# Patient Record
Sex: MALE | Race: BLACK OR AFRICAN AMERICAN | NOT HISPANIC OR LATINO | Employment: OTHER | URBAN - METROPOLITAN AREA
[De-identification: names, ages, dates, MRNs, and addresses within clinical notes are randomized per-mention and may not be internally consistent; named-entity substitution may affect disease eponyms.]

---

## 2017-01-12 ENCOUNTER — HOSPITAL ENCOUNTER (INPATIENT)
Facility: HOSPITAL | Age: 47
LOS: 11 days | Discharge: HOME | DRG: 617 | End: 2017-01-23
Attending: EMERGENCY MEDICINE | Admitting: HOSPITALIST
Payer: MEDICAID

## 2017-01-12 DIAGNOSIS — L97.519 DIABETIC ULCER OF RIGHT FOOT ASSOCIATED WITH TYPE 2 DIABETES MELLITUS: Chronic | ICD-10-CM

## 2017-01-12 DIAGNOSIS — B99.9 INFECTION: ICD-10-CM

## 2017-01-12 DIAGNOSIS — A42.9 ACTINOMYCES INFECTION: ICD-10-CM

## 2017-01-12 DIAGNOSIS — A49.1 GROUP B STREPTOCOCCAL INFECTION: ICD-10-CM

## 2017-01-12 DIAGNOSIS — M86.9 OSTEOMYELITIS OF RIGHT FOOT: ICD-10-CM

## 2017-01-12 DIAGNOSIS — M86.271 SUBACUTE OSTEOMYELITIS OF RIGHT FOOT: Primary | ICD-10-CM

## 2017-01-12 DIAGNOSIS — E11.40 TYPE 2 DIABETES MELLITUS WITH DIABETIC NEUROPATHY, WITH LONG-TERM CURRENT USE OF INSULIN: Chronic | ICD-10-CM

## 2017-01-12 DIAGNOSIS — M24.571 EQUINUS CONTRACTURE OF RIGHT ANKLE: ICD-10-CM

## 2017-01-12 DIAGNOSIS — M86.9 OSTEOMYELITIS OF RIGHT FOOT, UNSPECIFIED CHRONICITY: ICD-10-CM

## 2017-01-12 DIAGNOSIS — Z79.4 TYPE 2 DIABETES MELLITUS WITH DIABETIC NEUROPATHY, WITH LONG-TERM CURRENT USE OF INSULIN: Chronic | ICD-10-CM

## 2017-01-12 DIAGNOSIS — E11.621 DIABETIC ULCER OF RIGHT FOOT ASSOCIATED WITH TYPE 2 DIABETES MELLITUS: Chronic | ICD-10-CM

## 2017-01-12 DIAGNOSIS — B35.3 TINEA PEDIS OF LEFT FOOT: ICD-10-CM

## 2017-01-12 LAB
ALBUMIN SERPL BCP-MCNC: 2.7 G/DL
ALP SERPL-CCNC: 109 U/L
ALT SERPL W/O P-5'-P-CCNC: 7 U/L
ANION GAP SERPL CALC-SCNC: 14 MMOL/L
APTT BLDCRRT: 31.5 SEC
AST SERPL-CCNC: 12 U/L
B-OH-BUTYR BLD STRIP-SCNC: 0.3 MMOL/L
BACTERIA #/AREA URNS HPF: NORMAL /HPF
BASOPHILS # BLD AUTO: 0.02 K/UL
BASOPHILS NFR BLD: 0.2 %
BILIRUB SERPL-MCNC: 0.3 MG/DL
BILIRUB UR QL STRIP: NEGATIVE
BUN SERPL-MCNC: 16 MG/DL
CALCIUM SERPL-MCNC: 9.9 MG/DL
CHLORIDE SERPL-SCNC: 96 MMOL/L
CHOLEST/HDLC SERPL: 3.3 {RATIO}
CLARITY UR: CLEAR
CO2 SERPL-SCNC: 26 MMOL/L
COLOR UR: YELLOW
CREAT SERPL-MCNC: 1.1 MG/DL
CRP SERPL-MCNC: 19.6 MG/L
DIFFERENTIAL METHOD: ABNORMAL
EOSINOPHIL # BLD AUTO: 0 K/UL
EOSINOPHIL NFR BLD: 0.3 %
ERYTHROCYTE [DISTWIDTH] IN BLOOD BY AUTOMATED COUNT: 12.9 %
ERYTHROCYTE [SEDIMENTATION RATE] IN BLOOD BY WESTERGREN METHOD: 113 MM/HR
EST. GFR  (AFRICAN AMERICAN): >60 ML/MIN/1.73 M^2
EST. GFR  (NON AFRICAN AMERICAN): >60 ML/MIN/1.73 M^2
GLUCOSE SERPL-MCNC: 421 MG/DL
GLUCOSE UR QL STRIP: ABNORMAL
GRAM STN SPEC: NORMAL
GRAM STN SPEC: NORMAL
HCT VFR BLD AUTO: 37 %
HDL/CHOLESTEROL RATIO: 30.5 %
HDLC SERPL-MCNC: 141 MG/DL
HDLC SERPL-MCNC: 43 MG/DL
HGB BLD-MCNC: 11.8 G/DL
HGB UR QL STRIP: NEGATIVE
INR PPP: 1.1
KETONES UR QL STRIP: ABNORMAL
LDLC SERPL CALC-MCNC: 81.2 MG/DL
LEUKOCYTE ESTERASE UR QL STRIP: NEGATIVE
LYMPHOCYTES # BLD AUTO: 1.8 K/UL
LYMPHOCYTES NFR BLD: 18.9 %
MCH RBC QN AUTO: 28.4 PG
MCHC RBC AUTO-ENTMCNC: 31.9 %
MCV RBC AUTO: 89 FL
MICROSCOPIC COMMENT: NORMAL
MONOCYTES # BLD AUTO: 0.5 K/UL
MONOCYTES NFR BLD: 5.1 %
NEUTROPHILS # BLD AUTO: 7.2 K/UL
NEUTROPHILS NFR BLD: 75.3 %
NITRITE UR QL STRIP: NEGATIVE
NONHDLC SERPL-MCNC: 98 MG/DL
PH UR STRIP: 6 [PH] (ref 5–8)
PLATELET # BLD AUTO: 338 K/UL
PMV BLD AUTO: 10.6 FL
POCT GLUCOSE: 220 MG/DL (ref 70–110)
POCT GLUCOSE: 339 MG/DL (ref 70–110)
POCT GLUCOSE: 339 MG/DL (ref 70–110)
POCT GLUCOSE: 384 MG/DL (ref 70–110)
POCT GLUCOSE: 403 MG/DL (ref 70–110)
POTASSIUM SERPL-SCNC: 4.4 MMOL/L
PROT SERPL-MCNC: 7.9 G/DL
PROT UR QL STRIP: NEGATIVE
PROTHROMBIN TIME: 11.4 SEC
RBC # BLD AUTO: 4.15 M/UL
SODIUM SERPL-SCNC: 136 MMOL/L
SP GR UR STRIP: 1.02 (ref 1–1.03)
TRIGL SERPL-MCNC: 84 MG/DL
URN SPEC COLLECT METH UR: ABNORMAL
UROBILINOGEN UR STRIP-ACNC: NEGATIVE EU/DL
WBC # BLD AUTO: 9.52 K/UL
YEAST URNS QL MICRO: NORMAL

## 2017-01-12 PROCEDURE — 87070 CULTURE OTHR SPECIMN AEROBIC: CPT

## 2017-01-12 PROCEDURE — 81000 URINALYSIS NONAUTO W/SCOPE: CPT

## 2017-01-12 PROCEDURE — 99233 SBSQ HOSP IP/OBS HIGH 50: CPT | Mod: ,,, | Performed by: PODIATRIST

## 2017-01-12 PROCEDURE — 82962 GLUCOSE BLOOD TEST: CPT

## 2017-01-12 PROCEDURE — 25000003 PHARM REV CODE 250: Performed by: INTERNAL MEDICINE

## 2017-01-12 PROCEDURE — 83036 HEMOGLOBIN GLYCOSYLATED A1C: CPT

## 2017-01-12 PROCEDURE — 11000001 HC ACUTE MED/SURG PRIVATE ROOM

## 2017-01-12 PROCEDURE — 85730 THROMBOPLASTIN TIME PARTIAL: CPT

## 2017-01-12 PROCEDURE — 25000003 PHARM REV CODE 250: Performed by: EMERGENCY MEDICINE

## 2017-01-12 PROCEDURE — 87075 CULTR BACTERIA EXCEPT BLOOD: CPT

## 2017-01-12 PROCEDURE — 96375 TX/PRO/DX INJ NEW DRUG ADDON: CPT

## 2017-01-12 PROCEDURE — 36415 COLL VENOUS BLD VENIPUNCTURE: CPT

## 2017-01-12 PROCEDURE — 63600175 PHARM REV CODE 636 W HCPCS: Performed by: NURSE PRACTITIONER

## 2017-01-12 PROCEDURE — 80053 COMPREHEN METABOLIC PANEL: CPT

## 2017-01-12 PROCEDURE — 96366 THER/PROPH/DIAG IV INF ADDON: CPT

## 2017-01-12 PROCEDURE — 82010 KETONE BODYS QUAN: CPT

## 2017-01-12 PROCEDURE — A9585 GADOBUTROL INJECTION: HCPCS | Performed by: HOSPITALIST

## 2017-01-12 PROCEDURE — 85025 COMPLETE CBC W/AUTO DIFF WBC: CPT

## 2017-01-12 PROCEDURE — 80061 LIPID PANEL: CPT

## 2017-01-12 PROCEDURE — 86140 C-REACTIVE PROTEIN: CPT

## 2017-01-12 PROCEDURE — 25500020 PHARM REV CODE 255: Performed by: HOSPITALIST

## 2017-01-12 PROCEDURE — 25000003 PHARM REV CODE 250: Performed by: NURSE PRACTITIONER

## 2017-01-12 PROCEDURE — 96367 TX/PROPH/DG ADDL SEQ IV INF: CPT

## 2017-01-12 PROCEDURE — 96365 THER/PROPH/DIAG IV INF INIT: CPT

## 2017-01-12 PROCEDURE — 87070 CULTURE OTHR SPECIMN AEROBIC: CPT | Mod: 59

## 2017-01-12 PROCEDURE — 99285 EMERGENCY DEPT VISIT HI MDM: CPT | Mod: 25

## 2017-01-12 PROCEDURE — 96361 HYDRATE IV INFUSION ADD-ON: CPT

## 2017-01-12 PROCEDURE — 85610 PROTHROMBIN TIME: CPT

## 2017-01-12 PROCEDURE — 85652 RBC SED RATE AUTOMATED: CPT

## 2017-01-12 PROCEDURE — 63600175 PHARM REV CODE 636 W HCPCS: Performed by: EMERGENCY MEDICINE

## 2017-01-12 PROCEDURE — 87040 BLOOD CULTURE FOR BACTERIA: CPT

## 2017-01-12 PROCEDURE — 87205 SMEAR GRAM STAIN: CPT

## 2017-01-12 RX ORDER — ENOXAPARIN SODIUM 100 MG/ML
40 INJECTION SUBCUTANEOUS EVERY 24 HOURS
Status: DISCONTINUED | OUTPATIENT
Start: 2017-01-12 | End: 2017-01-23 | Stop reason: HOSPADM

## 2017-01-12 RX ORDER — ACETAMINOPHEN 325 MG/1
650 TABLET ORAL EVERY 6 HOURS PRN
Status: DISCONTINUED | OUTPATIENT
Start: 2017-01-12 | End: 2017-01-23 | Stop reason: HOSPADM

## 2017-01-12 RX ORDER — GLUCAGON 1 MG
1 KIT INJECTION
Status: DISCONTINUED | OUTPATIENT
Start: 2017-01-12 | End: 2017-01-23 | Stop reason: HOSPADM

## 2017-01-12 RX ORDER — SODIUM CHLORIDE 9 MG/ML
INJECTION, SOLUTION INTRAVENOUS CONTINUOUS
Status: ACTIVE | OUTPATIENT
Start: 2017-01-12 | End: 2017-01-12

## 2017-01-12 RX ORDER — DOXYLAMINE SUCCINATE 25 MG
TABLET ORAL 2 TIMES DAILY
Status: DISCONTINUED | OUTPATIENT
Start: 2017-01-12 | End: 2017-01-23 | Stop reason: HOSPADM

## 2017-01-12 RX ORDER — OXYCODONE HYDROCHLORIDE 5 MG/1
5 TABLET ORAL EVERY 6 HOURS PRN
Status: DISCONTINUED | OUTPATIENT
Start: 2017-01-12 | End: 2017-01-23 | Stop reason: HOSPADM

## 2017-01-12 RX ORDER — IBUPROFEN 200 MG
24 TABLET ORAL
Status: DISCONTINUED | OUTPATIENT
Start: 2017-01-12 | End: 2017-01-23 | Stop reason: HOSPADM

## 2017-01-12 RX ORDER — ONDANSETRON 8 MG/1
8 TABLET, ORALLY DISINTEGRATING ORAL EVERY 8 HOURS PRN
Status: DISCONTINUED | OUTPATIENT
Start: 2017-01-12 | End: 2017-01-23 | Stop reason: HOSPADM

## 2017-01-12 RX ORDER — IBUPROFEN 200 MG
16 TABLET ORAL
Status: DISCONTINUED | OUTPATIENT
Start: 2017-01-12 | End: 2017-01-23 | Stop reason: HOSPADM

## 2017-01-12 RX ORDER — OXYCODONE HYDROCHLORIDE 5 MG/1
10 TABLET ORAL EVERY 6 HOURS PRN
Status: DISCONTINUED | OUTPATIENT
Start: 2017-01-12 | End: 2017-01-23 | Stop reason: HOSPADM

## 2017-01-12 RX ORDER — RAMELTEON 8 MG/1
8 TABLET ORAL NIGHTLY PRN
Status: DISCONTINUED | OUTPATIENT
Start: 2017-01-12 | End: 2017-01-23 | Stop reason: HOSPADM

## 2017-01-12 RX ORDER — GADOBUTROL 604.72 MG/ML
9 INJECTION INTRAVENOUS
Status: COMPLETED | OUTPATIENT
Start: 2017-01-12 | End: 2017-01-12

## 2017-01-12 RX ORDER — INSULIN ASPART 100 [IU]/ML
0-5 INJECTION, SOLUTION INTRAVENOUS; SUBCUTANEOUS
Status: DISCONTINUED | OUTPATIENT
Start: 2017-01-12 | End: 2017-01-13

## 2017-01-12 RX ADMIN — INSULIN DETEMIR 15 UNITS: 100 INJECTION, SOLUTION SUBCUTANEOUS at 09:01

## 2017-01-12 RX ADMIN — SODIUM CHLORIDE 1000 ML: 0.9 INJECTION, SOLUTION INTRAVENOUS at 02:01

## 2017-01-12 RX ADMIN — PIPERACILLIN SODIUM AND TAZOBACTAM SODIUM 4.5 G: 4; .5 INJECTION, POWDER, LYOPHILIZED, FOR SOLUTION INTRAVENOUS at 12:01

## 2017-01-12 RX ADMIN — INSULIN HUMAN 10 UNITS: 100 INJECTION, SOLUTION PARENTERAL at 04:01

## 2017-01-12 RX ADMIN — INSULIN ASPART 4 UNITS: 100 INJECTION, SOLUTION INTRAVENOUS; SUBCUTANEOUS at 05:01

## 2017-01-12 RX ADMIN — VANCOMYCIN HYDROCHLORIDE 1000 MG: 1 INJECTION, POWDER, LYOPHILIZED, FOR SOLUTION INTRAVENOUS at 05:01

## 2017-01-12 RX ADMIN — MICONAZOLE NITRATE: 20 CREAM TOPICAL at 05:01

## 2017-01-12 RX ADMIN — GADOBUTROL 9 ML: 604.72 INJECTION INTRAVENOUS at 04:01

## 2017-01-12 RX ADMIN — CEFTRIAXONE 2 G: 2 INJECTION, POWDER, FOR SOLUTION INTRAMUSCULAR; INTRAVENOUS at 04:01

## 2017-01-12 RX ADMIN — SODIUM CHLORIDE: 0.9 INJECTION, SOLUTION INTRAVENOUS at 12:01

## 2017-01-12 RX ADMIN — INSULIN ASPART 2 UNITS: 100 INJECTION, SOLUTION INTRAVENOUS; SUBCUTANEOUS at 09:01

## 2017-01-12 RX ADMIN — VANCOMYCIN HYDROCHLORIDE 1500 MG: 1 INJECTION, POWDER, LYOPHILIZED, FOR SOLUTION INTRAVENOUS at 08:01

## 2017-01-12 RX ADMIN — PIPERACILLIN SODIUM AND TAZOBACTAM SODIUM 4.5 G: 4; .5 INJECTION, POWDER, LYOPHILIZED, FOR SOLUTION INTRAVENOUS at 09:01

## 2017-01-12 RX ADMIN — ENOXAPARIN SODIUM 40 MG: 100 INJECTION SUBCUTANEOUS at 12:01

## 2017-01-12 NOTE — IP AVS SNAPSHOT
hospitals  [address]  [place] [address]  Phone: [phone]           Patient Discharge Instructions     Our goal is to set you up for success. This packet includes information on your condition, medications, and your home care. It will help you to care for yourself so you don't get sicker and need to go back to the hospital.     Please ask your nurse if you have any questions.        There are many details to remember when preparing to leave the hospital. Here is what you will need to do:    1. Take your medicine. If you are prescribed medications, review your Medication List in the following pages. You may have new medications to  at the pharmacy and others that you'll need to stop taking. Review the instructions for how and when to take your medications. Talk with your doctor or nurses if you are unsure of what to do.     2. Go to your follow-up appointments. Specific follow-up information is listed in the following pages. Your may be contacted by a transition nurse or clinical provider about future appointments. Be sure we have all of the phone numbers to reach you, if needed. Please contact your provider's office if you are unable to make an appointment.     3. Watch for warning signs. Your doctor or nurse will give you detailed warning signs to watch for and when to call for assistance. These instructions may also include educational information about your condition. If you experience any of warning signs to your health, call your doctor.               ** Verify the list of medication(s) below is accurate and up to date. Carry this with you in case of emergency. If your medications have changed, please notify your healthcare provider.             Medication List      START taking these medications        Additional Info                      amoxicillin 500 MG capsule   Commonly known as:  AMOXIL   Quantity:  90 capsule   Refills:  1   Dose:  500 mg   Indications:  Bone/Joint     Last time this was given:  500 mg on 1/23/2017  6:06 AM   Instructions:  Take 1 capsule (500 mg total) by mouth every 8 (eight) hours.     Begin Date    AM    Noon    PM    Bedtime       fluconazole 200 MG Tab   Commonly known as:  DIFLUCAN   Quantity:  120 tablet   Refills:  1   Dose:  800 mg    Last time this was given:  800 mg on 1/23/2017  9:48 AM   Instructions:  Take 4 tablets (800 mg total) by mouth once daily.     Begin Date    AM    Noon    PM    Bedtime       metronidazole 500 MG tablet   Commonly known as:  FLAGYL   Quantity:  90 tablet   Refills:  1   Dose:  500 mg   Indications:  Bone/Joint    Last time this was given:  500 mg on 1/23/2017  6:06 AM   Instructions:  Take 1 tablet (500 mg total) by mouth every 8 (eight) hours.     Begin Date    AM    Noon    PM    Bedtime       miconazole 2 % cream   Commonly known as:  MICOTIN   Refills:  0   Indications:  tinea pedis    Last time this was given:  1/23/2017  9:49 AM   Instructions:  Apply topically 2 (two) times daily. Apply to left foot after thorough cleaning with soap and water. Need to dry foot completely prior to placing antifungal cream on foot.     Begin Date    AM    Noon    PM    Bedtime       oxycodone 5 MG immediate release tablet   Commonly known as:  ROXICODONE   Quantity:  10 tablet   Refills:  0   Dose:  5 mg    Instructions:  Take 1 tablet (5 mg total) by mouth every 6 (six) hours as needed for Pain.     Begin Date    AM    Noon    PM    Bedtime         CHANGE how you take these medications        Additional Info                      insulin glargine 100 unit/mL (3 mL) Inpn pen   Commonly known as:  LANTUS SOLOSTAR   Quantity:  1 Box   Refills:  3   Dose:  26 Units   What changed:    - how much to take  - additional instructions    Instructions:  Inject 26 Units into the skin every evening. 35 Insulin Pen Subcutaneous daily     Begin Date    AM    Noon    PM    Bedtime         CONTINUE taking these medications        Additional Info                       metformin 500 MG 24 hr tablet   Commonly known as:  GLUCOPHAGE-XR   Quantity:  120 tablet   Refills:  3    Instructions:  TAKE TWO TABLETS BY MOUTH TWICE A DAY WITH MEALS     Begin Date    AM    Noon    PM    Bedtime         STOP taking these medications     dulaglutide 0.75 mg/0.5 mL Pnij   Commonly known as:  TRULICITY            Where to Get Your Medications      These medications were sent to [facility] - [address]  [address], [place] [address]     Phone:  [phone]     amoxicillin 500 MG capsule    fluconazole 200 MG Tab    metformin 500 MG 24 hr tablet    metronidazole 500 MG tablet         You can get these medications from any pharmacy     Bring a paper prescription for each of these medications     oxycodone 5 MG immediate release tablet       You don't need a prescription for these medications     miconazole 2 % cream                  Please bring to all follow up appointments:    1. A copy of your discharge instructions.  2. All medicines you are currently taking in their original bottles.  3. Identification and insurance card.    Please arrive 15 minutes ahead of scheduled appointment time.    Please call 24 hours in advance if you must reschedule your appointment and/or time.        Your Scheduled Appointments     Feb 01, 2017  1:00 PM [facility]   Hospital Follow Up with MD Agustin Rodrigues [facility] - Internal Medicine ([facility])    [address]  [place] [address] [phone]            Feb 06, 2017  4:00 PM [facility]   New Patient with Iris Leone DPM   [facility] - Podiatry ([facility] )    [address]  [place] [address] [phone]              Follow-up Information     Follow up with Lorena Thakur MD On 2/1/2017.    Specialty:  Internal Medicine    Why:  1:00pm    Contact information:    [address]  [facility] [address] [phone]           Follow up with Ramirez Wilkes DPM On 2/6/2017.    Specialty:  Podiatry    Why:  4pm    Contact information:    [address]  [facility] [address]  [phone]          Discharge Instructions     Future Orders    Activity as tolerated     Call MD for:  increased confusion or weakness     Call MD for:  persistent dizziness, light-headedness, or visual disturbances     Call MD for:  redness, tenderness, or signs of infection (pain, swelling, redness, odor or green/yellow discharge around incision site)     Call MD for:  severe uncontrolled pain     Call MD for:  temperature >100.4     Diet Diabetic 2000 Calories       Discharge References/Attachments     SMOKING CESSATION (ENGLISH)    SMOKE-FREE, STAYING (ENGLISH)    SMOKE, WHY DO YOU (ENGLISH)    SMOKING WITHDRAWAL, COPING WITH (ENGLISH)    SMOKING, GETTING SUPPORT FOR QUITTING (ENGLISH)    SMOKING, PLANNING TO QUIT (ENGLISH)    SMOKING, TIPS FOR QUITTING (CARDIOVASCULAR) (ENGLISH)    OSTEOMYELITIS, DISCHARGE INSTRUCTIONS FOR (ENGLISH)    DIABETES: INSPECTING YOUR FEET (ENGLISH)    DIABETES: KEEPING FEET HEALTHY (ENGLISH)    DIABETES: TREATING SEVERE FOOT INFECTIONS (ENGLISH)    DIABETIC FOOT ULCERS, DISCHARGE INSTRUCTIONS (ENGLISH)    DIABETIC FOOT CARE (ENGLISH)        Primary Diagnosis     Your primary diagnosis was:  Subacute Osteomyelitis Of Right Foot      Admission Information     Date & Time Provider Department CSN    1/12/2017  1:44 AM Bharathi Terrazas MD [facility] [id number]      Care Providers     Provider Role Specialty Primary office phone    Bharathi Terrazas MD Attending Provider Hospitalist [phone]    Daniel Reyes DPM Consulting Physician  Podiatry [phone]    Rola Mckinney MD Consulting Physician  Infectious Diseases [phone]    Ramirez Wilkes DPM Surgeon  Podiatry [phone]      Your Vitals Were     BP Pulse Temp Resp Height Weight    168/97 (BP Location: Right arm, Patient Position: Lying,  "BP Method: Automatic) 86 98 °F (36.7 °C) (Oral) 20 6' 1" (1.854 m) 93 kg (205 lb)    SpO2 BMI             96% 27.05 kg/m2         Recent Lab Values        10/3/2012 6/7/2013 8/11/2014 12/4/2014 6/23/2015 10/29/2015 5/3/2016 1/12/2017     12:18 PM  8:18 AM  8:32 AM  3:15 PM  2:19 PM 12:06 PM 10:30 AM  2:28 AM    A1C 16.1 (H) 14.1 (H) 15.7 (H) 15.0 (H) 12.3 (H) 7.6 (H) 9.0 (H) 15.0 (H)    Comment for A1C at  2:28 AM on 1/12/2017:  According to ADA guidelines, hemoglobin A1C <7.0% represents  optimal control in non-pregnant diabetic patients.  Different  metrics may apply to specific populations.   Standards of Medical Care in Diabetes - 2016.  For the purpose of screening for the presence of diabetes:  <5.7%     Consistent with the absence of diabetes  5.7-6.4%  Consistent with increasing risk for diabetes   (prediabetes)  >or=6.5%  Consistent with diabetes  Currently no consensus exists for use of hemoglobin A1C  for diagnosis of diabetes for children.        Pending Labs     Order Current Status    Specimen to Pathology - Surgery Collected (01/19/17 1257)    Fungus culture In process    Specimen to Pathology - Surgery In process    Specimen to Pathology - Surgery In process    AFB Culture & Smear Preliminary result    AFB Culture & Smear Preliminary result    Aerobic culture Preliminary result    Fungus culture Preliminary result      Allergies as of 1/23/2017     No Known Allergies      [facility]     [facility] Nurse Care Line - 24/7 Assistance  Unless otherwise directed by your provider, please contact [facility], our nurse care line that is available for 24/7 assistance.     Registered nurses in the [facility] provide clinical advisement, health education, appointment booking, and other advisory services.  Call for this free service at [phone].        Advance Directives     An advance directive is a document which, in the event you are no longer able to make decisions for " yourself, tells your healthcare team what kind of treatment you do or do not want to receive, or who you would like to make those decisions for you.  If you do not currently have an advance directive, [facility] encourages you to create one.  For more information call:  [phone]), [phone] ([phone]),  or log on to [email or web address].        Language Assistance Services     ATTENTION: Language assistance services are available, free of charge. Please call [phone].      ATENCIÓN: Si habla español, tiene a remy disposición servicios gratuitos de asistencia lingüística. Llame al [phone].     CHÚ Ý: N?u b?n nói Ti?ng Vi?t, có các d?ch v? h? tr? ngôn ng? mi?n phí dành cho b?n. G?i s? [phone].        Diabetes Discharge Instructions                                    [facility] complies with applicable Federal civil rights laws and does not discriminate on the basis of race, color, national origin, age, disability, or sex.

## 2017-01-12 NOTE — CONSULTS
[facility]  Infectious Disease  Consult Note    Patient Name: Indio Ryder Jr.  MRN: [id number]  Admission Date: 1/12/2017  Hospital Length of Stay: 0 days  Attending Physician: Simon Kelly MD  Primary Care Provider: Lorena Thakur MD     Isolation Status: No active isolations    Patient information was obtained from patient, past medical records and ER records.      Inpatient consult to Infectious Diseases  Consult performed by: JOAN SANDOVAL  Consult ordered by: ROSA SOTO  Reason for consult: right foot osteomyelitis        Assessment/Plan:     * Osteomyelitis of right foot  47 y/o male with DM II and right foot ulcerations - history of longstanding ulcerations of this foot over 3 years - do not have old records. Patient had been on cipro and flagyl for about 6 months up until October 2016. He just restarted them PTA - had a few left at home.     Plan continue vanco and zosyn for now - Podiatry evaluating - if he is taken to surgery tomorrow - will stop antibiotics tonight while awaiting surgical cultures.     Diabetic ulcer of right foot associated with type 2 diabetes mellitus  Management of DM per primary.     Unknown tetanus immunization status - no record of administration on computer - will need tdap later on this hospitalization.     Tinea pedis of left foot  Will prescribe topical antifungal for left tinea pedis.       Thank you for your consult. I will follow-up with patient. Please contact us if you have any additional questions.[phone]    Subjective:     Principal Problem: Osteomyelitis of right foot    HPI: 47 y/o male with HTN, DM II, with peripheral neuropathy. Patient has had diabetic ulcer of right foot followed by podiatry as outpatient (Dr. William Manuel) up until October when patient lost insurance benefits. Patient was doing own wound care a few times a week. He noticed malodorous drainage and swelling and erythema in December prior to christmas. Patient  regained insurance Jan 1, 2017 and went to podiatry on 1/10 and he was instructed to come to the ED. Patient presented to ED 1/12/17 - x ray of right foot concerning for osteo of 1st and 5th MT and cellulitis. Patient was given 1L NS and vanco and ceftriaxone in ED and admitted to hospital medicine. ED reports patient has history of 3 years of ongoing ulcer to right foot. MRI of foot ordered. Podiatry consulted. Patient placed on vanco and zosyn on admit.     Patient stated he had been on po cipro and flagyl for about 6 months until October. He had a few antibiotic pills left and he took them recently when he started to have drainage from right foot PTA. Patient does not recall last tetanus shot.           Past Medical History   Diagnosis Date    Diabetes mellitus type II     Diabetic foot ulcer     Hyperlipidemia     Hypertension     Peripheral neuropathy     Ulcerative colitis, unspecified        Past Surgical History   Procedure Laterality Date    Toe amputation Right 06/05/2015       Review of patient's allergies indicates:  No Known Allergies    Medications:  Prescriptions Prior to Admission   Medication Sig    insulin glargine (LANTUS SOLOSTAR) 100 unit/mL (3 mL) InPn pen Inject 26 Units into the skin every evening. 35 Insulin Pen Subcutaneous daily (Patient taking differently: Inject 25 Units into the skin every evening. )    metformin (GLUCOPHAGE-XR) 500 MG 24 hr tablet TAKE TWO TABLETS BY MOUTH TWICE A DAY WITH MEALS     Antibiotics     Start     Stop Route Frequency Ordered    01/12/17 1300  piperacillin-tazobactam 4.5 g in dextrose 5 % 100 mL IVPB (ready to mix system)      -- IV Every 8 hours (non-standard times) 01/12/17 1239    01/12/17 1430  vancomycin 750 mg in dextrose 5 % 250 mL IVPB (ready to mix system)      -- IV Once 01/12/17 1321    01/12/17 2000  vancomycin (VANCOCIN) 1,500 mg in dextrose 5 % 250 mL IVPB      -- IV Every 12 hours (non-standard times) 01/12/17 1322        Antifungals      None        Antivirals     None           Immunization History   Administered Date(s) Administered    Pneumococcal Polysaccharide - 23 Valent 08/12/2014       Family History     Problem Relation (Age of Onset)    Cancer Mother    Cataracts Father    Diabetes Mother, Sister    Hypertension Mother, Father, Maternal Aunt        Social History     Social History    Marital status: Single     Spouse name: N/A    Number of children: 0    Years of education: N/A     Occupational History     [employer]     Social History Main Topics    Smoking status: Never Smoker    Smokeless tobacco: None    Alcohol use No    Drug use: No    Sexual activity: No     Other Topics Concern    None     Social History Narrative     Review of Systems   Respiratory: Negative for shortness of breath.    Gastrointestinal: Negative for diarrhea, nausea and vomiting.     Objective:     Vital Signs (Most Recent):  Temp: 98.9 °F (37.2 °C) (01/12/17 0559)  Pulse: 92 (01/12/17 1215)  Resp: 18 (01/12/17 1215)  BP: (!) 140/87 (01/12/17 1215)  SpO2: 96 % (01/12/17 1215) Vital Signs (24h Range):  Temp:  [98.5 °F (36.9 °C)-98.9 °F (37.2 °C)] 98.9 °F (37.2 °C)  Pulse:  [] 92  Resp:  [18-20] 18  SpO2:  [93 %-100 %] 96 %  BP: (105-140)/(60-87) 140/87     Weight: 93 kg (205 lb)  Body mass index is 27.05 kg/(m^2).    Estimated Creatinine Clearance: 92.8 mL/min (based on Cr of 1.1).    Physical Exam   Constitutional: No distress.   Cardiovascular: Normal heart sounds.    No murmur heard.  Pulmonary/Chest: Breath sounds normal. No respiratory distress.   Abdominal: Soft. Bowel sounds are normal. He exhibits no distension. There is no tenderness.   Musculoskeletal: He exhibits edema.   Right foot edematous with some warmth but no erythema - right foot 2-3 X larger than left foot. Right foot with open ulcer at base of great toe plantar surface and ulcer at lateral aspect of foot with missing 5th digit.  Left foot with tinea pedis severe and  flaking skin       Significant Labs:   Blood Culture:  BC X 2 collected   culture bone right foot - pending    Old Cultures -   6/3/15 - right foot abscess - skin janes no predominant  3/3/2006 - scrotal abscess - MRSA    CBC:   Recent Labs  Lab 17   WBC 9.52   HGB 11.8*   HCT 37.0*        CMP:   Recent Labs  Lab 17      K 4.4   CL 96   CO2 26   *   BUN 16   CREATININE 1.1   CALCIUM 9.9   PROT 7.9   ALBUMIN 2.7*   BILITOT 0.3   ALKPHOS 109   AST 12   ALT 7*   ANIONGAP 14   EGFRNONAA >60     Urine Studies:   Recent Labs  Lab 17  1341   COLORU Yellow   APPEARANCEUA Clear   PHUR 6.0   SPECGRAV 1.025   PROTEINUA Negative   GLUCUA 3+*   KETONESU 1+*   BILIRUBINUA Negative   OCCULTUA Negative   NITRITE Negative   UROBILINOGEN Negative   LEUKOCYTESUR Negative   BACTERIA None       Significant Imagin/12 MRI RLE - pending   right foot - Interval marked deformity and destruction about the first MTP joint involving the base of the first proximal phalanx and head of the first metatarsal highly concerning for septic arthritis/osteomyelitis. There is associated overlying cellulitis with focal plantar ulceration.  Interval partial amputation of the fifth metatarsal, noting slight irregularity with periosteal reaction involving the distal most portion of the remaining fifth metatarsal concerning for osteomyelitis.              Rola Mckinney MD  Infectious Disease  [facility]

## 2017-01-12 NOTE — ED NOTES
Pt appears to be resting . Respirations even and unlabored. Equal rise and fall of chest noted. Skin warm and dry. Pt easily aroused to verbal stimulation. Will continue to monitor.

## 2017-01-12 NOTE — ASSESSMENT & PLAN NOTE
47 y/o male with DM II and right foot ulcerations - history of longstanding ulcerations of this foot over 3 years - do not have old records. Patient had been on cipro and flagyl for about 6 months up until October 2016. He just restarted them PTA - had a few left at home.     Plan continue vanco and zosyn for now - Podiatry evaluating - if he is taken to surgery tomorrow - will stop antibiotics tonight while awaiting surgical cultures.

## 2017-01-12 NOTE — CONSULTS
Consult Note  Podiatry    Consult Requested By: Simon Kelly MD  Reason for Consult: right foot osteomyelitis    SUBJECTIVE     History of Present Illness:  Indio Ryder Jr. is a 46 y.o. male who  has a past medical history of Diabetes mellitus type II; Diabetic foot ulcer; Hyperlipidemia; Hypertension; Peripheral neuropathy; and Ulcerative colitis, unspecified. He has had an ulcer sub first MTPJ right foot off and on for several years. He is a patient of Dr. Manuel's outpatient who sent him in to the ED for osteomyelitis. Patient relates that he did not see Dr. Manuel for a long period prior to this most recent visit due to insurance reasons. He has been trying to treat this ulcer himself bandaging it daily. Denies f/c/n/v. History of partial 5th ray amputation about 2 yrs ago with no complication.    Scheduled Meds:   enoxaparin  40 mg Subcutaneous Daily    insulin detemir  15 Units Subcutaneous QHS    piperacillin-tazobactam 4.5 g in dextrose 5 % 100 mL IVPB (ready to mix system)  4.5 g Intravenous Q8H    vancomycin (VANCOCIN) IVPB  1,500 mg Intravenous Q12H    vancomycin (VANCOCIN) IVPB  750 mg Intravenous Once     Continuous Infusions:   sodium chloride 0.9% 125 mL/hr at 01/12/17 1251     PRN Meds:acetaminophen, dextrose 50%, dextrose 50%, glucagon (human recombinant), glucose, glucose, insulin aspart, ondansetron, oxycodone, oxycodone, promethazine (PHENERGAN) IVPB, ramelteon    Review of patient's allergies indicates:  No Known Allergies     Past Medical History   Diagnosis Date    Diabetes mellitus type II     Diabetic foot ulcer     Hyperlipidemia     Hypertension     Peripheral neuropathy     Ulcerative colitis, unspecified      Past Surgical History   Procedure Laterality Date    Toe amputation Right 06/05/2015     Family History   Problem Relation Age of Onset    Adopted: Yes    Hypertension Mother     Cancer Mother      breast    Diabetes Mother     Hypertension Father      Cataracts Father     Diabetes Sister     Hypertension Maternal Aunt      Social History   Substance Use Topics    Smoking status: Never Smoker    Smokeless tobacco: None    Alcohol use No       Review of Systems:  Constitutional: no fever or chills, pain well controlled  Respiratory: no cough or shortness of breath  Cardiovascular: no chest pain or palpitations  Gastrointestinal: no nausea or vomiting, tolerating diet  Musculoskeletal: no arthralgias or myalgias  Neurological: history of numbness or paresthesias in the feet    OBJECTIVE     Vital Signs (Most Recent):  Temp: 98.9 °F (37.2 °C) (01/12/17 0559)  Pulse: 92 (01/12/17 1215)  Resp: 18 (01/12/17 1215)  BP: (!) 140/87 (01/12/17 1215)  SpO2: 96 % (01/12/17 1215)    Vital Signs Range (Last 24H):  Temp:  [98.5 °F (36.9 °C)-98.9 °F (37.2 °C)]   Pulse:  []   Resp:  [18-20]   BP: (105-140)/(60-87)   SpO2:  [93 %-100 %]     Physical Exam:  General: Oriented to person, place, time, and situation. No acute distress.  Vascular: DP and PT pulses are 2+ bilaterally. CRT<3 seconds to digits 1-5 bilaterally. Marked edema to the right LE   Musculoskeletal: Equinus noted b/l ankles with < 10 deg DF noted. Strength 5/5 in DF/PF/Inv/Ev resistance with no reproduction of pain in any direction.   Neuro: Protective sensation absent bilateral   Derm: No open lesions, lacerations or wounds noted to the left foot.     Right Foot    Ulcer Location: Plantar first MTPJ  Measurements: 2.2 x 2.0 x 0.3 cm, probes to the dorsal foot and to bone  Periwound: Intact, hyperkeratotic  Drainage: Malodorous and purulent  Malodor: Present  Base:  100% granular.   Signs of infection: Erythema, purulence, malodor, probes to bone.    Ulcer Location: Plantar PIPJ  Measurements:1.0x1.0x0.3,  probes deep to bone  Periwound: Intact  Drainage: serosanguinous.  Pus: None.  Malodor: Present.  Base:  100% granular.  Signs of infection: Malodor and erythema.                  Laboratory:  CBC:    Recent Labs  Lab 01/12/17 0228   WBC 9.52   RBC 4.15*   HGB 11.8*   HCT 37.0*      MCV 89   MCH 28.4   MCHC 31.9*     CMP:   Recent Labs  Lab 01/12/17 0228   *   CALCIUM 9.9   ALBUMIN 2.7*   PROT 7.9      K 4.4   CO2 26   CL 96   BUN 16   CREATININE 1.1   ALKPHOS 109   ALT 7*   AST 12   BILITOT 0.3     ESR:   Recent Labs  Lab 01/12/17 0228   SEDRATE 113*     CRP:   Recent Labs  Lab 01/12/17 0228   CRP 19.6*     Microbiology Results (last 7 days)     Procedure Component Value Units Date/Time    Aerobic culture [[id number]]     Order Status:  No result Specimen:  Abscess from Foot, Right     Culture, Anaerobe [[id number]]     Order Status:  No result Specimen:  Abscess from Toe, Right Foot     Gram stain [[id number]]     Order Status:  No result Specimen:  Abscess from Foot, Right     Blood culture [[id number]] Collected:  01/12/17 0342    Order Status:  Sent Specimen:  Blood from Peripheral, Forearm, Left Updated:  01/12/17 0633    Blood culture [[id number]] Collected:  01/12/17 0230    Order Status:  No result Specimen:  Blood from Peripheral, Right  Arm Updated:  01/12/17 0633    Aerobic culture (Specify Source) **CANNOT BE ORDERED AS STAT** [[id number]] Collected:  01/12/17 0433    Order Status:  Sent Specimen:  Bone from Foot, Right Updated:  01/12/17 0633    Blood culture [[id number]]     Order Status:  Completed Specimen:  Blood     Blood culture [[id number]]     Order Status:  Canceled Specimen:  Blood           Diagnostic Results:  X-Ray: reviewed  MRI: pending     Right foot x-ray:   Interval marked deformity and destruction about the first MTP joint involving the base of the first proximal phalanx and head of the first metatarsal highly concerning for septic arthritis/osteomyelitis. There is associated overlying cellulitis with focal plantar ulceration.    Interval partial amputation of the fifth metatarsal, noting slight irregularity with periosteal reaction involving the distal most  portion of the remaining fifth metatarsal concerning for osteomyelitis.    ASSESSMENT/PLAN     Assessment:  -DM II with peripheral neuropathy  -Diabetic foot ulcer, right  -Diabetic foot infection with acute on chronic osteomyelitis    Plan:  - With patients permission, I&D at right foot was performed at 1st IPJ and MTPJ ulcers with 15 blade and scissors. Approximally 5 mL purulent drainage expressed. The wounds were irrigated, cultured and packed open with mepilex AG. No anesthesia required due to severe DPN.  -Aerobic, anaerobic cultures and gram stain taken and sent  -ID on board, appreciate their recommendations     -MRI pending  -ALISSA to rule out PAD  -Will likely require a first ray amputation with the amount of bone loss noted on x-ray secondary to infection. Discussed this with patient in detail. He is reluctant to undergo amputation at this point. Will further discuss with him after MRI returns. Will also consider achilles lengthening to decrease plantar forefoot pressures and promote healing.  -Appreciate consult, Podiatry will follow call with questions or concerns    Bill Muhammad DPM PGY-3  Pager [phone]    I have reviewed and concur with the resident's history, physical, assessment, and plan.  I have personally interviewed and examined the patient at bedside.      Ramirez Wilkes DPM

## 2017-01-12 NOTE — PLAN OF CARE
Future Appointments  Date Time Provider Department Center   2/1/2017 1:00 PM Lorena Thakur MD [facility]     Patient sees podiatry Dr. Manuel.       01/12/17 1250   Discharge Assessment   Assessment Type Discharge Planning Assessment   Confirmed/corrected address and phone number on facesheet? Yes   Assessment information obtained from? Patient   Expected Length of Stay (days) 2   Communicated expected length of stay with patient/caregiver yes   Prior to hospitilization cognitive status: Alert/Oriented   Prior to hospitalization functional status: Independent   Current cognitive status: Alert/Oriented   Current Functional Status: Independent   Arrived From home or self-care   Lives With alone   Able to Return to Prior Arrangements yes   Is patient able to care for self after discharge? No   How many people do you have in your home that can help with your care after discharge? 0   Patient's perception of discharge disposition home or selfcare   Readmission Within The Last 30 Days no previous admission in last 30 days   Patient currently being followed by outpatient case management? No   Patient currently receives home health services? No   Equipment Currently Used at Home none   Do you have any problems affording any of your prescribed medications? No   Is the patient taking medications as prescribed? yes   Do you have any financial concerns preventing you from receiving the healthcare you need? No   Does the patient have transportation to healthcare appointments? Yes   Transportation Available family or friend will provide   On Dialysis? No   Does the patient receive services at the [facility]madin Clinic? No   Are there any open cases? No   Discharge Plan A Home   Discharge Plan B Home   Patient/Family In Agreement With Plan yes     Geetha Moraes RN, CCM, CMSRN  RN Transition Navigator  [phone]

## 2017-01-12 NOTE — PROGRESS NOTES
Pharmacokinetics Pharmacy Protocol   WBC 9.52, Scr 1.1, Crcl 92.8  HT: 73, WT: 93, afebrile.  Indication; osteo diabetic foot ulcer  Current antiobiotics: zosyn 4.5gm q 8hrs and vancomycin 1gm given. Then vancomycin 1gm q12hrs.    Based on his pharmacokinetics/protocol  Give an additional 750 mg as a loading dose now, and then increase vancomycin 1500mg Q12 hrs starting @ 20:00.  Trough to be drawn prior to the 4th dose @ 07:00 on 1/14/17.  Pharmacy will continue to follow.

## 2017-01-12 NOTE — ASSESSMENT & PLAN NOTE
Management of DM per primary.     Unknown tetanus immunization status - no record of administration on computer - will need tdap later on this hospitalization.

## 2017-01-12 NOTE — ED TRIAGE NOTES
Pt presents to ED c/o diabetic foot ulcer to right foot. States it has been present x 3 years but worsening. Pt states he went to a podiatrist on Tuesday and was told to come to the ED for infection. Pt's wound to bottom of foot and under 2nd toe. + oozing and odor. States he has normal sensation. Pt walking with boot. Right lower leg and foot swollen and skin is cracked/dry.

## 2017-01-12 NOTE — ASSESSMENT & PLAN NOTE
Diabetic ulcer of right foot associated with type 2 diabetes mellitus    , CRP 19.6.  Right foot x-ray concerning for 1st and 5th toe osteomyelitis.  Given rocephin and vancomycin in ED.  Obtain MRI of right foot.  Continue vancomycin, add zosyn 4.5 GM q8h.  Vanc trough prior to 4th dose.  Consult podiatry for evaluation and wound care recs, appreciate assistance.  Consult ID for Abx choice and duration; appreciate assistance.

## 2017-01-12 NOTE — ASSESSMENT & PLAN NOTE
"SBP range 115 to 132.  Patient reports that he does not take any medications for BP at home.  States he took himself off of BP medications because "I didn't like the way it made me feel".  Continue to monitor.  Would benefit from low dose ACE-I for renal protection with type 2 DM.    "

## 2017-01-12 NOTE — SUBJECTIVE & OBJECTIVE
Past Medical History   Diagnosis Date    Diabetes mellitus type II     Diabetic foot ulcer     Hyperlipidemia     Hypertension     Peripheral neuropathy     Ulcerative colitis, unspecified        Past Surgical History   Procedure Laterality Date    Toe amputation Right 06/05/2015       Review of patient's allergies indicates:  No Known Allergies    Medications:  Prescriptions Prior to Admission   Medication Sig    insulin glargine (LANTUS SOLOSTAR) 100 unit/mL (3 mL) InPn pen Inject 26 Units into the skin every evening. 35 Insulin Pen Subcutaneous daily (Patient taking differently: Inject 25 Units into the skin every evening. )    metformin (GLUCOPHAGE-XR) 500 MG 24 hr tablet TAKE TWO TABLETS BY MOUTH TWICE A DAY WITH MEALS     Antibiotics     Start     Stop Route Frequency Ordered    01/12/17 1300  piperacillin-tazobactam 4.5 g in dextrose 5 % 100 mL IVPB (ready to mix system)      -- IV Every 8 hours (non-standard times) 01/12/17 1239    01/12/17 1430  vancomycin 750 mg in dextrose 5 % 250 mL IVPB (ready to mix system)      -- IV Once 01/12/17 1321    01/12/17 2000  vancomycin (VANCOCIN) 1,500 mg in dextrose 5 % 250 mL IVPB      -- IV Every 12 hours (non-standard times) 01/12/17 1322        Antifungals     None        Antivirals     None           Immunization History   Administered Date(s) Administered    Pneumococcal Polysaccharide - 23 Valent 08/12/2014       Family History     Problem Relation (Age of Onset)    Cancer Mother    Cataracts Father    Diabetes Mother, Sister    Hypertension Mother, Father, Maternal Aunt        Social History     Social History    Marital status: Single     Spouse name: N/A    Number of children: 0    Years of education: N/A     Occupational History     [employer]     Social History Main Topics    Smoking status: Never Smoker    Smokeless tobacco: None    Alcohol use No    Drug use: No    Sexual activity: No     Other Topics Concern    None     Social  History Narrative     Review of Systems   Respiratory: Negative for shortness of breath.    Gastrointestinal: Negative for diarrhea, nausea and vomiting.     Objective:     Vital Signs (Most Recent):  Temp: 98.9 °F (37.2 °C) (01/12/17 0559)  Pulse: 92 (01/12/17 1215)  Resp: 18 (01/12/17 1215)  BP: (!) 140/87 (01/12/17 1215)  SpO2: 96 % (01/12/17 1215) Vital Signs (24h Range):  Temp:  [98.5 °F (36.9 °C)-98.9 °F (37.2 °C)] 98.9 °F (37.2 °C)  Pulse:  [] 92  Resp:  [18-20] 18  SpO2:  [93 %-100 %] 96 %  BP: (105-140)/(60-87) 140/87     Weight: 93 kg (205 lb)  Body mass index is 27.05 kg/(m^2).    Estimated Creatinine Clearance: 92.8 mL/min (based on Cr of 1.1).    Physical Exam   Constitutional: No distress.   Cardiovascular: Normal heart sounds.    No murmur heard.  Pulmonary/Chest: Breath sounds normal. No respiratory distress.   Abdominal: Soft. Bowel sounds are normal. He exhibits no distension. There is no tenderness.   Musculoskeletal: He exhibits edema.   Right foot edematous with some warmth but no erythema - right foot 2-3 X larger than left foot. Right foot with open ulcer at base of great toe plantar surface and ulcer at lateral aspect of foot with missing 5th digit.  Left foot with tinea pedis severe and flaking skin       Significant Labs:   Blood Culture: 1/12 BC X 2 collected  1/12 culture bone right foot - pending    Old Cultures -   6/3/15 - right foot abscess - skin janes no predominant  3/3/2006 - scrotal abscess - MRSA    CBC:   Recent Labs  Lab 01/12/17 0228   WBC 9.52   HGB 11.8*   HCT 37.0*        CMP:   Recent Labs  Lab 01/12/17 0228      K 4.4   CL 96   CO2 26   *   BUN 16   CREATININE 1.1   CALCIUM 9.9   PROT 7.9   ALBUMIN 2.7*   BILITOT 0.3   ALKPHOS 109   AST 12   ALT 7*   ANIONGAP 14   EGFRNONAA >60     Urine Studies:   Recent Labs  Lab 01/12/17  1341   COLORU Yellow   APPEARANCEUA Clear   PHUR 6.0   SPECGRAV 1.025   PROTEINUA Negative   GLUCUA 3+*   KETONESU 1+*    BILIRUBINUA Negative   OCCULTUA Negative   NITRITE Negative   UROBILINOGEN Negative   LEUKOCYTESUR Negative   BACTERIA None       Significant Imagin/12 MRI RLE - pending   right foot - Interval marked deformity and destruction about the first MTP joint involving the base of the first proximal phalanx and head of the first metatarsal highly concerning for septic arthritis/osteomyelitis. There is associated overlying cellulitis with focal plantar ulceration.  Interval partial amputation of the fifth metatarsal, noting slight irregularity with periosteal reaction involving the distal most portion of the remaining fifth metatarsal concerning for osteomyelitis.

## 2017-01-12 NOTE — H&P
"[facility]  [facility]    Patient Name: Indio Ryder Jr.  MRN: [id number]  Admission Date: 1/12/2017  Attending Physician: Katerina Buck MD   Primary Care Provider: Lorena Thakur MD         Patient information was obtained from patient, past medical records and ER records.     Subjective:     Principal Problem:Osteomyelitis of right foot    Chief Complaint: right foot ulcer     HPI: Indio Ryder Jr. Is a 46 y.o.  male with HTN and type 2 DM with peripheral neuropathy.  He is single; works in [employer] at [employer]. His PCP is Dr. Lorena Thakur.  He is followed by podiatrist Dr. William Manuel.    Patient was being followed by podiatrist Dr. William Manuel for diabetic ulcer of right foot.  He reports that he lost his insurance and was unable to continue to f/u with podiatry (stopped going in October).  States that he was performing wound care himself at home  "a couple of times per week".  Reports he noticed malodorous drainage, redness and swelling of right foot prior to [place] accompanied by fever, chills, and fatigue.  Denies pain, shortness of breath, chest pain, nausea, vomiting.  Regained insurance beginning January 1, 2017.  Was seen in office by podiatrist Dr. Manuel on 1/10/17, instructed to seek medical attention in ED for IV antibiotics and to have "Dr. Matthew take a look at it".      Presented to ED on 1/12/16, afebrile, HR 110s, WBC count 9.52, H/H 11.8/37, , CRP 19.6, glucose 421.  X-ray of right foot concerning for cellulitis and osteomyelitis.  Given 1L NS, ceftriaxone, and vancomycin in ED.  Admitted to Hospital Medicine service for osteomyelitis of right 1st and 5th metatarsals.      Past Medical History   Diagnosis Date    Diabetes mellitus type II     Diabetic foot ulcer     Hyperlipidemia     Hypertension     Peripheral neuropathy     Ulcerative colitis, unspecified        Past Surgical History "   Procedure Laterality Date    Toe amputation Right 06/05/2015       Review of patient's allergies indicates:  No Known Allergies    No current facility-administered medications on file prior to encounter.      Current Outpatient Prescriptions on File Prior to Encounter   Medication Sig    insulin glargine (LANTUS SOLOSTAR) 100 unit/mL (3 mL) InPn pen Inject 26 Units into the skin every evening. 35 Insulin Pen Subcutaneous daily (Patient taking differently: Inject 25 Units into the skin every evening. )    metformin (GLUCOPHAGE-XR) 500 MG 24 hr tablet TAKE TWO TABLETS BY MOUTH TWICE A DAY WITH MEALS    [DISCONTINUED] dulaglutide (TRULICITY) 0.75 mg/0.5 mL PnIj Inject 0.75 mg into the skin every 7 days.     Family History     Problem Relation (Age of Onset)    Cancer Mother    Cataracts Father    Diabetes Mother, Sister    Hypertension Mother, Father, Maternal Aunt        Social History Main Topics    Smoking status: Never Smoker    Smokeless tobacco: Not on file    Alcohol use No    Drug use: No    Sexual activity: No     Review of Systems   Constitutional: Positive for chills, fatigue and fever.   HENT: Negative for congestion and sore throat.    Eyes: Negative for photophobia and visual disturbance.   Respiratory: Negative for cough and shortness of breath.    Cardiovascular: Negative for chest pain and leg swelling.   Gastrointestinal: Negative for abdominal pain, nausea and vomiting.   Endocrine: Negative for cold intolerance, heat intolerance, polydipsia, polyphagia and polyuria.   Genitourinary: Negative for dysuria, frequency and urgency.   Musculoskeletal: Positive for joint swelling. Negative for arthralgias and myalgias.   Skin: Positive for color change and wound.   Allergic/Immunologic: Negative for immunocompromised state.   Neurological: Positive for numbness. Negative for dizziness and light-headedness.        Chronic paresthesias to bilateral hands and feet.   Hematological: Does not  bruise/bleed easily.   Psychiatric/Behavioral: Negative for confusion. The patient is not nervous/anxious.      Objective:     Vital Signs (Most Recent):  Temp: 98.9 °F (37.2 °C) (01/12/17 0559)  Pulse: (!) 111 (01/12/17 0551)  Resp: 20 (01/12/17 0118)  BP: 115/73 (01/12/17 0551)  SpO2: 98 % (01/12/17 0551) Vital Signs (24h Range):  Temp:  [98.5 °F (36.9 °C)-98.9 °F (37.2 °C)] 98.9 °F (37.2 °C)  Pulse:  [101-118] 111  Resp:  [20] 20  BP: (115-132)/(73-87) 115/73     Weight: 93 kg (205 lb)  Body mass index is 27.05 kg/(m^2).    Physical Exam   Constitutional: He is oriented to person, place, and time. He appears well-developed and well-nourished. No distress.   HENT:   Head: Normocephalic and atraumatic.   Mouth/Throat: Oropharynx is clear and moist.   Eyes: EOM are normal. Pupils are equal, round, and reactive to light. No scleral icterus.   Neck: Normal range of motion. Neck supple.   Cardiovascular: Normal rate, regular rhythm and intact distal pulses.    Pulmonary/Chest: Effort normal and breath sounds normal. No respiratory distress. He has no wheezes.   Abdominal: Soft. Bowel sounds are normal. He exhibits no distension. There is no tenderness.   Musculoskeletal: He exhibits edema. He exhibits no tenderness.        Right foot: There is swelling.   3+ edema to right lower leg and foot. Amputation of right 5th toe.   Neurological: He is alert and oriented to person, place, and time. GCS eye subscore is 4. GCS verbal subscore is 5. GCS motor subscore is 6.   Skin: Skin is dry. No cyanosis. Nails show no clubbing.        Psychiatric: He has a normal mood and affect. His speech is normal and behavior is normal.   Nursing note and vitals reviewed.    Right foot               Significant Labs:   A1C: No results for input(s): HGBA1C in the last 4320 hours.  CBC:   Recent Labs  Lab 01/12/17  0228   WBC 9.52   HGB 11.8*   HCT 37.0*        CMP:   Recent Labs  Lab 01/12/17  0228      K 4.4   CL 96   CO2 26  "  *   BUN 16   CREATININE 1.1   CALCIUM 9.9   PROT 7.9   ALBUMIN 2.7*   BILITOT 0.3   ALKPHOS 109   AST 12   ALT 7*   ANIONGAP 14   EGFRNONAA >60     Coagulation:   Recent Labs  Lab 01/12/17  0236   INR 1.1   APTT 31.5     POCT Glucose:   Recent Labs  Lab 01/12/17  0129 01/12/17  0340 01/12/17  0544   POCTGLUCOSE 403* 384* 220*       CRP 19.6    All pertinent labs within the past 24 hours have been reviewed.    Significant Imaging: I have reviewed all pertinent imaging results/findings within the past 24 hours.   X-Ray Foot Complete Right:   - Interval marked deformity and destruction about the first MTP joint involving the base of the first proximal phalanx and head of the first metatarsal highly concerning for septic arthritis/osteomyelitis. There is associated overlying cellulitis with focal plantar ulceration.    - Interval partial amputation of the fifth metatarsal, noting slight irregularity with periosteal reaction involving the distal most portion of the remaining fifth metatarsal concerning for osteomyelitis.    Assessment/Plan:     * Osteomyelitis of right foot  Diabetic ulcer of right foot associated with type 2 diabetes mellitus    , CRP 19.6.  Right foot x-ray concerning for 1st and 5th toe osteomyelitis.  Given rocephin and vancomycin in ED.  Obtain MRI of right foot.  Continue vancomycin, add zosyn 4.5 GM q8h.  Vanc trough prior to 4th dose.  Consult podiatry for evaluation and wound care recs, appreciate assistance.  Consult ID for Abx choice and duration; appreciate assistance.      Essential hypertension  SBP range 115 to 132.  Patient reports that he does not take any medications for BP at home.  States he took himself off of BP medications because "I didn't like the way it made me feel".  Continue to monitor.  Would benefit from low dose ACE-I for renal protection with type 2 DM.      Hyperlipidemia  Reports that he does not take any medications for cholesterol at home.  " Obtain lipid panel.      DM type 2, uncontrolled, with neuropathy  Takes metformin 500 mg BID and lantus 25 units qHS at home.  Glucose 421 upon arrival to ED.  Hold metformin while inpatient.  Obtain A1c. Regular [id number] units IV.  Levemir 15 units qHS.  Accuchecks with SSI.  Pattern glucose and adjust insulin dose as necessary.      VTE Risk Mitigation     None        John Kim NP  Department of Hospital Medicine   [facility]

## 2017-01-12 NOTE — SUBJECTIVE & OBJECTIVE
Past Medical History   Diagnosis Date    Diabetes mellitus type II     Diabetic foot ulcer     Hyperlipidemia     Hypertension     Peripheral neuropathy     Ulcerative colitis, unspecified        Past Surgical History   Procedure Laterality Date    Toe amputation Right 06/05/2015       Review of patient's allergies indicates:  No Known Allergies    No current facility-administered medications on file prior to encounter.      Current Outpatient Prescriptions on File Prior to Encounter   Medication Sig    insulin glargine (LANTUS SOLOSTAR) 100 unit/mL (3 mL) InPn pen Inject 26 Units into the skin every evening. 35 Insulin Pen Subcutaneous daily (Patient taking differently: Inject 25 Units into the skin every evening. )    metformin (GLUCOPHAGE-XR) 500 MG 24 hr tablet TAKE TWO TABLETS BY MOUTH TWICE A DAY WITH MEALS    [DISCONTINUED] dulaglutide (TRULICITY) 0.75 mg/0.5 mL PnIj Inject 0.75 mg into the skin every 7 days.     Family History     Problem Relation (Age of Onset)    Cancer Mother    Cataracts Father    Diabetes Mother, Sister    Hypertension Mother, Father, Maternal Aunt        Social History Main Topics    Smoking status: Never Smoker    Smokeless tobacco: Not on file    Alcohol use No    Drug use: No    Sexual activity: No     Review of Systems   Constitutional: Positive for chills, fatigue and fever.   HENT: Negative for congestion and sore throat.    Eyes: Negative for photophobia and visual disturbance.   Respiratory: Negative for cough and shortness of breath.    Cardiovascular: Negative for chest pain and leg swelling.   Gastrointestinal: Negative for abdominal pain, nausea and vomiting.   Endocrine: Negative for cold intolerance, heat intolerance, polydipsia, polyphagia and polyuria.   Genitourinary: Negative for dysuria, frequency and urgency.   Musculoskeletal: Positive for joint swelling. Negative for arthralgias and myalgias.   Skin: Positive for color change and wound.    Allergic/Immunologic: Negative for immunocompromised state.   Neurological: Positive for numbness. Negative for dizziness and light-headedness.        Chronic paresthesias to bilateral hands and feet.   Hematological: Does not bruise/bleed easily.   Psychiatric/Behavioral: Negative for confusion. The patient is not nervous/anxious.      Objective:     Vital Signs (Most Recent):  Temp: 98.9 °F (37.2 °C) (01/12/17 0559)  Pulse: (!) 111 (01/12/17 0551)  Resp: 20 (01/12/17 0118)  BP: 115/73 (01/12/17 0551)  SpO2: 98 % (01/12/17 0551) Vital Signs (24h Range):  Temp:  [98.5 °F (36.9 °C)-98.9 °F (37.2 °C)] 98.9 °F (37.2 °C)  Pulse:  [101-118] 111  Resp:  [20] 20  BP: (115-132)/(73-87) 115/73     Weight: 93 kg (205 lb)  Body mass index is 27.05 kg/(m^2).    Physical Exam   Constitutional: He is oriented to person, place, and time. He appears well-developed and well-nourished. No distress.   HENT:   Head: Normocephalic and atraumatic.   Mouth/Throat: Oropharynx is clear and moist.   Eyes: EOM are normal. Pupils are equal, round, and reactive to light. No scleral icterus.   Neck: Normal range of motion. Neck supple.   Cardiovascular: Normal rate, regular rhythm and intact distal pulses.    Pulmonary/Chest: Effort normal and breath sounds normal. No respiratory distress. He has no wheezes.   Abdominal: Soft. Bowel sounds are normal. He exhibits no distension. There is no tenderness.   Musculoskeletal: He exhibits edema. He exhibits no tenderness.        Right foot: There is swelling.   3+ edema to right lower leg and foot. Amputation of right 5th toe.   Neurological: He is alert and oriented to person, place, and time. GCS eye subscore is 4. GCS verbal subscore is 5. GCS motor subscore is 6.   Skin: Skin is dry. No cyanosis. Nails show no clubbing.        Psychiatric: He has a normal mood and affect. His speech is normal and behavior is normal.   Nursing note and vitals reviewed.    Right foot               Significant Labs:    A1C: No results for input(s): HGBA1C in the last 4320 hours.  CBC:   Recent Labs  Lab 01/12/17 0228   WBC 9.52   HGB 11.8*   HCT 37.0*        CMP:   Recent Labs  Lab 01/12/17 0228      K 4.4   CL 96   CO2 26   *   BUN 16   CREATININE 1.1   CALCIUM 9.9   PROT 7.9   ALBUMIN 2.7*   BILITOT 0.3   ALKPHOS 109   AST 12   ALT 7*   ANIONGAP 14   EGFRNONAA >60     Coagulation:   Recent Labs  Lab 01/12/17  0236   INR 1.1   APTT 31.5     POCT Glucose:   Recent Labs  Lab 01/12/17  0129 01/12/17  0340 01/12/17  0544   POCTGLUCOSE 403* 384* 220*       CRP 19.6    All pertinent labs within the past 24 hours have been reviewed.    Significant Imaging: I have reviewed all pertinent imaging results/findings within the past 24 hours.   X-Ray Foot Complete Right:   - Interval marked deformity and destruction about the first MTP joint involving the base of the first proximal phalanx and head of the first metatarsal highly concerning for septic arthritis/osteomyelitis. There is associated overlying cellulitis with focal plantar ulceration.    - Interval partial amputation of the fifth metatarsal, noting slight irregularity with periosteal reaction involving the distal most portion of the remaining fifth metatarsal concerning for osteomyelitis.

## 2017-01-12 NOTE — ASSESSMENT & PLAN NOTE
Takes metformin 500 mg BID and lantus 25 units qHS at home.  Glucose 421 upon arrival to ED.  Hold metformin while inpatient.  Obtain A1c. Regular uuagtfh70 units IV.  Levemir 15 units qHS.  Accuchecks with SSI.  Pattern glucose and adjust insulin dose as necessary.

## 2017-01-13 PROBLEM — M86.271 SUBACUTE OSTEOMYELITIS OF RIGHT FOOT: Status: ACTIVE | Noted: 2017-01-13

## 2017-01-13 PROBLEM — A49.1 GROUP B STREPTOCOCCAL INFECTION: Status: ACTIVE | Noted: 2017-01-13

## 2017-01-13 LAB
ANION GAP SERPL CALC-SCNC: 10 MMOL/L
BASOPHILS # BLD AUTO: 0.01 K/UL
BASOPHILS NFR BLD: 0.2 %
BUN SERPL-MCNC: 8 MG/DL
CALCIUM SERPL-MCNC: 9.3 MG/DL
CHLORIDE SERPL-SCNC: 99 MMOL/L
CO2 SERPL-SCNC: 28 MMOL/L
CREAT SERPL-MCNC: 0.8 MG/DL
DIFFERENTIAL METHOD: ABNORMAL
EOSINOPHIL # BLD AUTO: 0.1 K/UL
EOSINOPHIL NFR BLD: 1.8 %
ERYTHROCYTE [DISTWIDTH] IN BLOOD BY AUTOMATED COUNT: 12.8 %
EST. GFR  (AFRICAN AMERICAN): >60 ML/MIN/1.73 M^2
EST. GFR  (NON AFRICAN AMERICAN): >60 ML/MIN/1.73 M^2
ESTIMATED AVG GLUCOSE: 384 MG/DL
GLUCOSE SERPL-MCNC: 277 MG/DL
HBA1C MFR BLD HPLC: 15 %
HCT VFR BLD AUTO: 33.3 %
HGB BLD-MCNC: 10.7 G/DL
LYMPHOCYTES # BLD AUTO: 1.9 K/UL
LYMPHOCYTES NFR BLD: 34.1 %
MCH RBC QN AUTO: 28.5 PG
MCHC RBC AUTO-ENTMCNC: 32.1 %
MCV RBC AUTO: 89 FL
MONOCYTES # BLD AUTO: 0.4 K/UL
MONOCYTES NFR BLD: 6.5 %
NEUTROPHILS # BLD AUTO: 3.1 K/UL
NEUTROPHILS NFR BLD: 57 %
PLATELET # BLD AUTO: 304 K/UL
PMV BLD AUTO: 10.2 FL
POCT GLUCOSE: 214 MG/DL (ref 70–110)
POCT GLUCOSE: 254 MG/DL (ref 70–110)
POCT GLUCOSE: 351 MG/DL (ref 70–110)
POCT GLUCOSE: 374 MG/DL (ref 70–110)
POTASSIUM SERPL-SCNC: 3.7 MMOL/L
RBC # BLD AUTO: 3.76 M/UL
SODIUM SERPL-SCNC: 137 MMOL/L
VANCOMYCIN TROUGH SERPL-MCNC: 7.9 UG/ML
VASCULAR ANKLE BRACHIAL INDEX (ABI) RIGHT: 1.14 (ref 0.9–1.2)
WBC # BLD AUTO: 5.51 K/UL

## 2017-01-13 PROCEDURE — 36415 COLL VENOUS BLD VENIPUNCTURE: CPT

## 2017-01-13 PROCEDURE — 63600175 PHARM REV CODE 636 W HCPCS: Performed by: NURSE PRACTITIONER

## 2017-01-13 PROCEDURE — 25000003 PHARM REV CODE 250: Performed by: NURSE PRACTITIONER

## 2017-01-13 PROCEDURE — 93922 UPR/L XTREMITY ART 2 LEVELS: CPT

## 2017-01-13 PROCEDURE — 99232 SBSQ HOSP IP/OBS MODERATE 35: CPT | Mod: ,,, | Performed by: PODIATRIST

## 2017-01-13 PROCEDURE — 63600175 PHARM REV CODE 636 W HCPCS: Performed by: HOSPITALIST

## 2017-01-13 PROCEDURE — 93922 UPR/L XTREMITY ART 2 LEVELS: CPT | Mod: 26,,, | Performed by: INTERNAL MEDICINE

## 2017-01-13 PROCEDURE — 80048 BASIC METABOLIC PNL TOTAL CA: CPT

## 2017-01-13 PROCEDURE — 11000001 HC ACUTE MED/SURG PRIVATE ROOM

## 2017-01-13 PROCEDURE — 85025 COMPLETE CBC W/AUTO DIFF WBC: CPT

## 2017-01-13 PROCEDURE — 80202 ASSAY OF VANCOMYCIN: CPT

## 2017-01-13 PROCEDURE — 94761 N-INVAS EAR/PLS OXIMETRY MLT: CPT

## 2017-01-13 RX ORDER — INSULIN ASPART 100 [IU]/ML
8 INJECTION, SOLUTION INTRAVENOUS; SUBCUTANEOUS
Status: DISCONTINUED | OUTPATIENT
Start: 2017-01-13 | End: 2017-01-15

## 2017-01-13 RX ORDER — INSULIN ASPART 100 [IU]/ML
1-10 INJECTION, SOLUTION INTRAVENOUS; SUBCUTANEOUS
Status: DISCONTINUED | OUTPATIENT
Start: 2017-01-13 | End: 2017-01-23 | Stop reason: HOSPADM

## 2017-01-13 RX ADMIN — INSULIN DETEMIR 5 UNITS: 100 INJECTION, SOLUTION SUBCUTANEOUS at 09:01

## 2017-01-13 RX ADMIN — INSULIN ASPART 8 UNITS: 100 INJECTION, SOLUTION INTRAVENOUS; SUBCUTANEOUS at 05:01

## 2017-01-13 RX ADMIN — INSULIN ASPART 10 UNITS: 100 INJECTION, SOLUTION INTRAVENOUS; SUBCUTANEOUS at 11:01

## 2017-01-13 RX ADMIN — VANCOMYCIN HYDROCHLORIDE 1500 MG: 1 INJECTION, POWDER, LYOPHILIZED, FOR SOLUTION INTRAVENOUS at 09:01

## 2017-01-13 RX ADMIN — PIPERACILLIN SODIUM AND TAZOBACTAM SODIUM 4.5 G: 4; .5 INJECTION, POWDER, LYOPHILIZED, FOR SOLUTION INTRAVENOUS at 08:01

## 2017-01-13 RX ADMIN — ENOXAPARIN SODIUM 40 MG: 100 INJECTION SUBCUTANEOUS at 11:01

## 2017-01-13 RX ADMIN — PIPERACILLIN SODIUM AND TAZOBACTAM SODIUM 4.5 G: 4; .5 INJECTION, POWDER, LYOPHILIZED, FOR SOLUTION INTRAVENOUS at 05:01

## 2017-01-13 RX ADMIN — INSULIN ASPART 5 UNITS: 100 INJECTION, SOLUTION INTRAVENOUS; SUBCUTANEOUS at 09:01

## 2017-01-13 RX ADMIN — PIPERACILLIN SODIUM AND TAZOBACTAM SODIUM 4.5 G: 4; .5 INJECTION, POWDER, LYOPHILIZED, FOR SOLUTION INTRAVENOUS at 02:01

## 2017-01-13 RX ADMIN — INSULIN ASPART 3 UNITS: 100 INJECTION, SOLUTION INTRAVENOUS; SUBCUTANEOUS at 06:01

## 2017-01-13 RX ADMIN — MICONAZOLE NITRATE: 20 CREAM TOPICAL at 09:01

## 2017-01-13 RX ADMIN — VANCOMYCIN HYDROCHLORIDE 1500 MG: 1 INJECTION, POWDER, LYOPHILIZED, FOR SOLUTION INTRAVENOUS at 08:01

## 2017-01-13 NOTE — NURSING
Patient on monitor showed 8 beat run of v tach asymptomatic vital signs taken (see flowsheet). Notified KERRY Kim NP. No new orders noted. Will continue to monitor patient.

## 2017-01-13 NOTE — PROGRESS NOTES
"[facility]  Progress Note    Patient Name: Indio Ryder Jr.  MRN: [id number]  Patient Class: IP- Inpatient   Admission Date: 1/12/2017  Length of Stay: 1 days  Attending Physician: Simon Kelly MD  Primary Care Provider: Lorena Thakur MD        Subjective:     Principal Problem:Osteomyelitis of right foot    HPI:  Indio Ryder Jr. Is a 46 y.o.  man with hypertension and diabetes mellitus type 2 with peripheral neuropathy, s/p 5th toe ray amputation on 6/05/15. He lives in [place], [place]. He is single. He works in [employer] at [employer]. His primary care physician is Dr. Lorena Thakur. His podiatrist is Dr. William Manuel.    He lost his insurance and was unable to continue following up with Dr. Manuel in October 2016. He had taken ciprofloxacin and metronidazole for 6 months up until then. He was performing wound care himself at home "a couple of times per week". He noticed malodorous drainage, redness and swelling of his right foot prior to [place], accompanied by fever, chills, and fatigue. He regained insurance beginning January 1, 2017.  Dr. Manuel saw him in his clinic on 1/10/17 and instructed him to go to an ED for IV antibiotics and to have "Dr. Matthew take a look at it". Dr. Matthew is a general surgeon at [facility]. He went to [facility], where there are also podiatrists, on 1/12/17.    He was afebrile, but pulse was 110s. WBC count was 9520 and hemoglobin and hematocrit were 11.8 and 37. ESR was 113, CRP was 19.6. He was hyperglycemic (glucose 421). X-ray of the right foot showed cellulitis and osteomyelitis of the right 1st and 5th metatarsals. He was given 1 liter normal saline, ceftriaxone, and vancomycin in the ED. He was admitted to [facility].       Hospital Course:  Vancomycin was continued and ceftriaxone changed to piperacillin-tazobactam. Podiatry was consulted and " performed bedside debridement and culture. He reported that he had been hyperglycemic recently due to the infection. Hemoglobin A1c was 15%. MRI showed osteomyelitis of most of the 1st metatarsal and the phalanges.     Interval History: Hyperglycemic.    Review of Systems   Constitutional: Negative for chills and fever.   Cardiovascular: Negative for chest pain and palpitations.     Objective:     Vital Signs (Most Recent):  Temp: 97.8 °F (36.6 °C) (01/13/17 0800)  Pulse: 87 (01/13/17 0800)  Resp: 16 (01/13/17 0800)  BP: 103/80 (01/13/17 0800)  SpO2: 97 % (01/13/17 0813) Vital Signs (24h Range):  Temp:  [97.8 °F (36.6 °C)-98.7 °F (37.1 °C)] 97.8 °F (36.6 °C)  Pulse:  [81-99] 87  Resp:  [16-20] 16  SpO2:  [96 %-98 %] 97 %  BP: (103-144)/(62-87) 103/80     Weight: 93 kg (205 lb)  Body mass index is 27.05 kg/(m^2).    Intake/Output Summary (Last 24 hours) at 01/13/17 1158  Last data filed at 01/13/17 1000   Gross per 24 hour   Intake              780 ml   Output             1500 ml   Net             -720 ml      Physical Exam   Constitutional: He is oriented to person, place, and time. He appears well-developed and well-nourished.   Cardiovascular: Normal rate and regular rhythm.    Pulmonary/Chest: Effort normal. No respiratory distress.   Abdominal: Soft. There is no tenderness.   Musculoskeletal: He exhibits edema.   Neurological: He is alert and oriented to person, place, and time.   Psychiatric: He has a normal mood and affect.   Nursing note and vitals reviewed.      Significant Labs:   A1C:   Recent Labs  Lab 01/12/17  0228   HGBA1C 15.0*     All pertinent labs within the past 24 hours have been reviewed.    Significant Imaging:   MRI Lower Extremity W WO Contrast Right 1/13/17: Status post transmetatarsal amputation of 5th ray.  There is advanced bone marrow replacement of the 1st toe involving the majority of the metatarsal as well as the phalanges.  There is associated cortical destruction, fragmentation of  the 1st metatarsal head and extensive soft tissue edema.  There is a plantar ulcer in the region of the MTP joint with a 3 x 3 x 2 cm abscess replacing the joint.  Additional small ulcer noted at the distal aspect of the toe.  Remaining rays demonstrate preserved marrow signal, without evidence for fracture or osteomyelitis.  Lisfranc ligament is intact.  Impression: Great toe osteomyelitis involving the phalanges and majority of metatarsal.  Plantar ulcer with abscess at the MTP joint.  Additional small ulcer at the distal aspect of the toe.    Assessment/Plan:      * Osteomyelitis of right foot  Diabetic ulcer of right foot associated with type 2 diabetes mellitus  Continue vancomycin and piperacillin-tazobactam empirically. Follow up wound culture. Can get further culture with debridement. He wants to avoid amputation for now. Appreciate Infectious Disease and Podiatry.      Essential hypertension  Not requiring medications at this time.      Hyperlipidemia  Not currently on medications.      Diabetic ulcer of right foot associated with type 2 diabetes mellitus  Podiatry to debride.      Type 2 diabetes mellitus with diabetic neuropathy, with long-term current use of insulin  Takes metformin 500 mg BID and insulin glargine 25 units HS at home. Hemoglobin A1c 15%. Give insulin detemir 25 units HS, insulin aspart 8 units TID with meals, moderate dose insulin sliding scale.      Tinea pedis of left foot  Started on miconazole cream.      VTE Risk Mitigation         Ordered     enoxaparin injection 40 mg  Daily     Route:  Subcutaneous        01/12/17 1239     Medium Risk of VTE  Once      01/12/17 1239        Disposition: Continue antibiotics over the weekend and await culture results and surgical intervention.    Simon Kelly MD  Department of Hospital Medicine   [facility]

## 2017-01-13 NOTE — ASSESSMENT & PLAN NOTE
Takes metformin 500 mg BID and insulin glargine 25 units HS at home. Hemoglobin A1c 15%. Give insulin detemir 25 units HS, insulin aspart 8 units TID with meals, moderate dose insulin sliding scale.

## 2017-01-13 NOTE — PROGRESS NOTES
[facility]  Infectious Disease  Progress Note    Patient Name: Indio Ryder Jr.  MRN: [id number]  Admission Date: 1/12/2017  Length of Stay: 1 days  Attending Physician: Simon Kelly MD  Primary Care Provider: Lorena Thakur MD    Isolation Status: No active isolations  Assessment/Plan:      * Osteomyelitis of right foot  45 y/o male with DM II and right foot ulcerations - history of longstanding ulcerations of this foot over 3 years - do not have old records. Patient had been on cipro and flagyl for about 6 months up until October 2016. He just restarted them PTA - had a few left at home.     Plan continue vanco and zosyn for now - Podiatry evaluating for surgery    So far group B strep from superficial cultures    Need to see if patient has gotten tetanus booster        Tinea pedis of left foot  Topical antifungal prescribed      Anticipated Disposition: Patient needs I + D of right foot and possible amputation    Thank you for your consult. I will follow-up with patient. Please contact us if you have any additional questions.[phone]    Subjective:     Principal Problem:Osteomyelitis of right foot    Interval History: MRI done 1/12 positive for osteo of great toe and abscess on right foot. Group B strep growing from right foot culture from 1/12. Await Podiatry plan.     HPI:  45 y/o male with HTN, DM II, with peripheral neuropathy. Patient has had diabetic ulcer of right foot followed by podiatry as outpatient (Dr. William Manuel) up until October when patient lost insurance benefits. Patient was doing own wound care a few times a week. He noticed malodorous drainage and swelling and erythema in December prior to ann. Patient regained insurance Jan 1, 2017 and went to podiatry on 1/10 and he was instructed to come to the ED. Patient presented to ED 1/12/17 - x ray of right foot concerning for osteo of 1st and 5th MT and cellulitis. Patient was given 1L NS and vanco and ceftriaxone  in ED and admitted to hospital medicine. ED reports patient has history of 3 years of ongoing ulcer to right foot. MRI of foot ordered. Podiatry consulted. Patient placed on vanco and zosyn on admit.     Patient stated he had been on po cipro and flagyl for about 6 months until October. He had a few antibiotic pills left and he took them recently when he started to have drainage from right foot PTA. Patient does not recall last tetanus shot.           Review of Systems   Respiratory: Negative for shortness of breath.    Gastrointestinal: Negative for diarrhea, nausea and vomiting.     Objective:     Antibiotics     Start     Stop Route Frequency Ordered    01/12/17 1300  piperacillin-tazobactam 4.5 g in dextrose 5 % 100 mL IVPB (ready to mix system)      -- IV Every 8 hours (non-standard times) 01/12/17 1239    01/12/17 1430  vancomycin 750 mg in dextrose 5 % 250 mL IVPB (ready to mix system)      -- IV Once 01/12/17 1321    01/12/17 2000  vancomycin (VANCOCIN) 1,500 mg in dextrose 5 % 250 mL IVPB      -- IV Every 12 hours (non-standard times) 01/12/17 1322        Antifungals     Start     Stop Route Frequency Ordered    01/12/17 1500  miconazole 2 % cream      -- Top 2 times daily 01/12/17 1459          Vital Signs (Most Recent):  Temp: 98.4 °F (36.9 °C) (01/13/17 1200)  Pulse: 86 (01/13/17 1200)  Resp: 14 (01/13/17 1200)  BP: 122/83 (01/13/17 1200)  SpO2: 97 % (01/13/17 0813) Vital Signs (24h Range):  Temp:  [97.8 °F (36.6 °C)-98.4 °F (36.9 °C)] 98.4 °F (36.9 °C)  Pulse:  [81-94] 86  Resp:  [14-20] 14  SpO2:  [97 %-98 %] 97 %  BP: (103-122)/(62-83) 122/83     Weight: 93 kg (205 lb)  Body mass index is 27.05 kg/(m^2).    Estimated Creatinine Clearance: 127.6 mL/min (based on Cr of 0.8).    Physical Exam   Cardiovascular: Normal heart sounds.    No murmur heard.  Pulmonary/Chest: Breath sounds normal. No respiratory distress.   Abdominal: Soft. Bowel sounds are normal. He exhibits no distension. There is no  tenderness.   Musculoskeletal: He exhibits no edema.   Right foot bandaged, left foot with tinea pedis       Significant Labs:    right foot culture - GPC   right foot culture - group B strep    Blood Culture:   Recent Labs  Lab 17  0230 17  0342   LABBLOO No Growth to date  No Growth to date No Growth to date  No Growth to date     CBC:   Recent Labs  Lab 17  0228 17  0411   WBC 9.52 5.51   HGB 11.8* 10.7*   HCT 37.0* 33.3*    304     CMP:   Recent Labs  Lab 17  0411    137   K 4.4 3.7   CL 96 99   CO2 26 28   * 277*   BUN 16 8   CREATININE 1.1 0.8   CALCIUM 9.9 9.3   PROT 7.9  --    ALBUMIN 2.7*  --    BILITOT 0.3  --    ALKPHOS 109  --    AST 12  --    ALT 7*  --    ANIONGAP 14 10   EGFRNONAA >60 >60     Urine Studies:   Recent Labs  Lab 17  1341   COLORU Yellow   APPEARANCEUA Clear   PHUR 6.0   SPECGRAV 1.025   PROTEINUA Negative   GLUCUA 3+*   KETONESU 1+*   BILIRUBINUA Negative   OCCULTUA Negative   NITRITE Negative   UROBILINOGEN Negative   LEUKOCYTESUR Negative   BACTERIA None       Significant Imagin/12 right foot mri - 1.  Great toe osteomyelitis involving the phalanges and majority of metatarsal.  Plantar ulcer with abscess at the MTP joint.  Additional small ulcer at the distal aspect of the toe.        Rola Mckinney MD  Infectious Disease  [facility]

## 2017-01-13 NOTE — ASSESSMENT & PLAN NOTE
Diabetic ulcer of right foot associated with type 2 diabetes mellitus  Continue vancomycin and piperacillin-tazobactam empirically. Follow up wound culture. Can get further culture with debridement. He wants to avoid amputation for now. Appreciate Infectious Disease and Podiatry.

## 2017-01-13 NOTE — SUBJECTIVE & OBJECTIVE
Interval History: Hyperglycemic.    Review of Systems   Constitutional: Negative for chills and fever.   Cardiovascular: Negative for chest pain and palpitations.     Objective:     Vital Signs (Most Recent):  Temp: 97.8 °F (36.6 °C) (01/13/17 0800)  Pulse: 87 (01/13/17 0800)  Resp: 16 (01/13/17 0800)  BP: 103/80 (01/13/17 0800)  SpO2: 97 % (01/13/17 0813) Vital Signs (24h Range):  Temp:  [97.8 °F (36.6 °C)-98.7 °F (37.1 °C)] 97.8 °F (36.6 °C)  Pulse:  [81-99] 87  Resp:  [16-20] 16  SpO2:  [96 %-98 %] 97 %  BP: (103-144)/(62-87) 103/80     Weight: 93 kg (205 lb)  Body mass index is 27.05 kg/(m^2).    Intake/Output Summary (Last 24 hours) at 01/13/17 1158  Last data filed at 01/13/17 1000   Gross per 24 hour   Intake              780 ml   Output             1500 ml   Net             -720 ml      Physical Exam   Constitutional: He is oriented to person, place, and time. He appears well-developed and well-nourished.   Cardiovascular: Normal rate and regular rhythm.    Pulmonary/Chest: Effort normal. No respiratory distress.   Abdominal: Soft. There is no tenderness.   Musculoskeletal: He exhibits edema.   Neurological: He is alert and oriented to person, place, and time.   Psychiatric: He has a normal mood and affect.   Nursing note and vitals reviewed.      Significant Labs:   A1C:   Recent Labs  Lab 01/12/17  0228   HGBA1C 15.0*     All pertinent labs within the past 24 hours have been reviewed.    Significant Imaging:   MRI Lower Extremity W WO Contrast Right 1/13/17: Status post transmetatarsal amputation of 5th ray.  There is advanced bone marrow replacement of the 1st toe involving the majority of the metatarsal as well as the phalanges.  There is associated cortical destruction, fragmentation of the 1st metatarsal head and extensive soft tissue edema.  There is a plantar ulcer in the region of the MTP joint with a 3 x 3 x 2 cm abscess replacing the joint.  Additional small ulcer noted at the distal aspect of the  toe.  Remaining rays demonstrate preserved marrow signal, without evidence for fracture or osteomyelitis.  Lisfranc ligament is intact.  Impression: Great toe osteomyelitis involving the phalanges and majority of metatarsal.  Plantar ulcer with abscess at the MTP joint.  Additional small ulcer at the distal aspect of the toe.

## 2017-01-13 NOTE — PHYSICIAN QUERY
"PT Name: Indio Ryder Jr.  MR #: [id number]  Physician Query Form - Procedure Clarification   Reviewer Ninoska Scott RN CDIS Ext [email or web address]    This form is a permanent document in the medical record.     Query Date: January 13, 2017  By submitting this query, we are merely seeking further clarification of documentation. Please utilize your independent clinical judgment when addressing the question(s) below.    The Medical record reflects the following:   Indicators     Supporting Clinical Findings Location in Medical Record    Documentation of "Debridement"       x Documentation of "I & D" I&D at right foot was performed at 1st IPJ and MTPJ ulcers with 15 blade and scissors. Podiatry Consult Note    EBL =     x Other: Ulcer Location: Plantar first MTPJ:  probes to the dorsal foot and to bone     Ulcer Location: Plantar PIPJ:   probes deep to bone   Podiatry Consult Note     Excisional debridement is a surgical removal of all non vitalized tissue, necrosis or slough. The use of a sharp instrument does not always indicate that an excisional debridement was performed.  Non excisional debridement is the scraping away or removal of loose tissue fragments.    Provider, please specify the type of procedure(s) performed:    [  ] Excisional Debridement (Specify site, type, depth of tissue removal, instrument, size of wound and EBL)      *Site: (Specify) __________________________________________________      *Depth of tissue excised:      [  ] Skin/Subcutaneous  [  ] Soft tissue  [  ] Fascia  [  ] Muscle  [  ] Bone     *Instrument Used:      [  ] Scalpel  [  ] Scissors   [  ] Curet   [  ] Other (Specify) __________________     *Size of wound after debridement : (Specify) ______________________________________     *EBL (Specify) __________________________________________________    [  ] Non Excisional Debridement    [ x ] Incision and Drainage    [  ] Other Procedure (Specify) " ________________________________    [  ] Clinically Undetermined

## 2017-01-13 NOTE — ASSESSMENT & PLAN NOTE
47 y/o male with DM II and right foot ulcerations - history of longstanding ulcerations of this foot over 3 years - do not have old records. Patient had been on cipro and flagyl for about 6 months up until October 2016. He just restarted them PTA - had a few left at home.     Plan continue vanco and zosyn for now - Podiatry evaluating for surgery    So far group B strep from superficial cultures    Need to see if patient has gotten tetanus booster

## 2017-01-13 NOTE — PLAN OF CARE
Problem: Patient Care Overview  Goal: Plan of Care Review  Outcome: Ongoing (interventions implemented as appropriate)  RA SAT 97%

## 2017-01-13 NOTE — SUBJECTIVE & OBJECTIVE
Interval History: MRI done 1/12 positive for osteo of great toe and abscess on right foot. Group B strep growing from right foot culture from 1/12. Await Podiatry plan.     HPI:  47 y/o male with HTN, DM II, with peripheral neuropathy. Patient has had diabetic ulcer of right foot followed by podiatry as outpatient (Dr. William Manuel) up until October when patient lost insurance benefits. Patient was doing own wound care a few times a week. He noticed malodorous drainage and swelling and erythema in December prior to ann. Patient regained insurance Jan 1, 2017 and went to podiatry on 1/10 and he was instructed to come to the ED. Patient presented to ED 1/12/17 - x ray of right foot concerning for osteo of 1st and 5th MT and cellulitis. Patient was given 1L NS and vanco and ceftriaxone in ED and admitted to hospital medicine. ED reports patient has history of 3 years of ongoing ulcer to right foot. MRI of foot ordered. Podiatry consulted. Patient placed on vanco and zosyn on admit.     Patient stated he had been on po cipro and flagyl for about 6 months until October. He had a few antibiotic pills left and he took them recently when he started to have drainage from right foot PTA. Patient does not recall last tetanus shot.           Review of Systems   Respiratory: Negative for shortness of breath.    Gastrointestinal: Negative for diarrhea, nausea and vomiting.     Objective:     Antibiotics     Start     Stop Route Frequency Ordered    01/12/17 1300  piperacillin-tazobactam 4.5 g in dextrose 5 % 100 mL IVPB (ready to mix system)      -- IV Every 8 hours (non-standard times) 01/12/17 1239    01/12/17 1430  vancomycin 750 mg in dextrose 5 % 250 mL IVPB (ready to mix system)      -- IV Once 01/12/17 1321    01/12/17 2000  vancomycin (VANCOCIN) 1,500 mg in dextrose 5 % 250 mL IVPB      -- IV Every 12 hours (non-standard times) 01/12/17 1322        Antifungals     Start     Stop Route Frequency Ordered    01/12/17  1500  miconazole 2 % cream      -- Top 2 times daily 17 1459          Vital Signs (Most Recent):  Temp: 98.4 °F (36.9 °C) (17 1200)  Pulse: 86 (17 1200)  Resp: 14 (17 1200)  BP: 122/83 (17 1200)  SpO2: 97 % (17 0813) Vital Signs (24h Range):  Temp:  [97.8 °F (36.6 °C)-98.4 °F (36.9 °C)] 98.4 °F (36.9 °C)  Pulse:  [81-94] 86  Resp:  [14-20] 14  SpO2:  [97 %-98 %] 97 %  BP: (103-122)/(62-83) 122/83     Weight: 93 kg (205 lb)  Body mass index is 27.05 kg/(m^2).    Estimated Creatinine Clearance: 127.6 mL/min (based on Cr of 0.8).    Physical Exam   Cardiovascular: Normal heart sounds.    No murmur heard.  Pulmonary/Chest: Breath sounds normal. No respiratory distress.   Abdominal: Soft. Bowel sounds are normal. He exhibits no distension. There is no tenderness.   Musculoskeletal: He exhibits no edema.   Right foot bandaged, left foot with tinea pedis       Significant Labs:    right foot culture - GPC   right foot culture - group B strep    Blood Culture:   Recent Labs  Lab 17  0230 17  0342   LABBLOO No Growth to date  No Growth to date No Growth to date  No Growth to date     CBC:   Recent Labs  Lab 178 17  0411   WBC 9.52 5.51   HGB 11.8* 10.7*   HCT 37.0* 33.3*    304     CMP:   Recent Labs  Lab 178 17  0411    137   K 4.4 3.7   CL 96 99   CO2 26 28   * 277*   BUN 16 8   CREATININE 1.1 0.8   CALCIUM 9.9 9.3   PROT 7.9  --    ALBUMIN 2.7*  --    BILITOT 0.3  --    ALKPHOS 109  --    AST 12  --    ALT 7*  --    ANIONGAP 14 10   EGFRNONAA >60 >60     Urine Studies:   Recent Labs  Lab 17  1341   COLORU Yellow   APPEARANCEUA Clear   PHUR 6.0   SPECGRAV 1.025   PROTEINUA Negative   GLUCUA 3+*   KETONESU 1+*   BILIRUBINUA Negative   OCCULTUA Negative   NITRITE Negative   UROBILINOGEN Negative   LEUKOCYTESUR Negative   BACTERIA None       Significant Imagin/12 right foot mri - 1.  Great toe  osteomyelitis involving the phalanges and majority of metatarsal.  Plantar ulcer with abscess at the MTP joint.  Additional small ulcer at the distal aspect of the toe.

## 2017-01-13 NOTE — PHARMACY MED REC
"MedMined Medication Reconciliation  Template    Patient was admitted on 1/12/2017 for Osteomyelitis of right foot.      Patient's prior to admission medication regimen was as follows:  Prescriptions Prior to Admission   Medication Sig Dispense Refill Last Dose    insulin glargine (LANTUS SOLOSTAR) 100 unit/mL (3 mL) InPn pen Inject 26 Units into the skin every evening. 35 Insulin Pen Subcutaneous daily (Patient taking differently: Inject 25 Units into the skin every evening. ) 1 Box 3 1/11/2017    metformin (GLUCOPHAGE-XR) 500 MG 24 hr tablet TAKE TWO TABLETS BY MOUTH TWICE A DAY WITH MEALS 120 tablet 3 1/11/2017         Please add appropriate    SmartPhrase below:      Admission Medication Reconciliation - Pharmacy Consult Note    The home medication history was taken by Emmanuel Doe PharmD.  Based on information gathered and subsequent review by the clinical pharmacist, the items below may need attention.    Patient wants BEDSIDE DELIVERY. Pharmacy has been updated to [facility].    You may go to "Admission" then "Reconcile Home Medications" tabs to review and/or act upon these items.        No issues noted with the medication reconciliation.        Potential issues to be addressed PRIOR TO DISCHARGE  None    Please address this information as you see fit.  Feel free to contact us if you have any questions or require assistance.    Emmanuel Doe  [phone]    "

## 2017-01-13 NOTE — PROGRESS NOTES
Progress Note  Podiatry    Consult Requested By: Simon Kelly MD  Reason for Consult: right foot osteomyelitis    SUBJECTIVE     History of Present Illness:  Indio Ryder Jr. is a 46 y.o. male who  has a past medical history of Diabetes mellitus type II; Diabetic foot ulcer; Hyperlipidemia; Hypertension; Peripheral neuropathy; and Ulcerative colitis, unspecified. He has had an ulcer sub first MTPJ right foot off and on for several years. He is a patient of Dr. Manuel's outpatient who sent him in to the ED for osteomyelitis. Patient relates that he did not see Dr. Manuel for a long period prior to this most recent visit due to insurance reasons. He has been trying to treat this ulcer himself bandaging it daily. Denies f/c/n/v. History of partial 5th ray amputation about 2 yrs ago with no complication.    1/13/16: Pt s/p day 1 bedside I&D for abscess. Pt denies any pain. Denies N/V/F/C. No complaints at this time.     Scheduled Meds:   enoxaparin  40 mg Subcutaneous Daily    insulin aspart  8 Units Subcutaneous TIDWM    insulin detemir  25 Units Subcutaneous QHS    miconazole   Topical (Top) BID    piperacillin-tazobactam 4.5 g in dextrose 5 % 100 mL IVPB (ready to mix system)  4.5 g Intravenous Q8H    vancomycin (VANCOCIN) IVPB  1,500 mg Intravenous Q12H    vancomycin (VANCOCIN) IVPB  750 mg Intravenous Once     Continuous Infusions:     PRN Meds:acetaminophen, dextrose 50%, dextrose 50%, glucagon (human recombinant), glucose, glucose, insulin aspart, ondansetron, oxycodone, oxycodone, promethazine (PHENERGAN) IVPB, ramelteon    Review of patient's allergies indicates:  No Known Allergies     Past Medical History   Diagnosis Date    Diabetes mellitus type II     Diabetic foot ulcer     Hyperlipidemia     Hypertension     Peripheral neuropathy     Ulcerative colitis, unspecified      Past Surgical History   Procedure Laterality Date    Toe amputation Right 06/05/2015     Family History    Problem Relation Age of Onset    Adopted: Yes    Hypertension Mother     Cancer Mother      breast    Diabetes Mother     Hypertension Father     Cataracts Father     Diabetes Sister     Hypertension Maternal Aunt      Social History   Substance Use Topics    Smoking status: Never Smoker    Smokeless tobacco: None    Alcohol use No       Review of Systems:  Constitutional: no fever or chills, pain well controlled  Respiratory: no cough or shortness of breath  Cardiovascular: no chest pain or palpitations  Gastrointestinal: no nausea or vomiting, tolerating diet  Musculoskeletal: no arthralgias or myalgias  Neurological: history of numbness or paresthesias in the feet    OBJECTIVE     Vital Signs (Most Recent):  Temp: 98.4 °F (36.9 °C) (01/13/17 1200)  Pulse: 86 (01/13/17 1200)  Resp: 14 (01/13/17 1200)  BP: 122/83 (01/13/17 1200)  SpO2: 97 % (01/13/17 0813)    Vital Signs Range (Last 24H):  Temp:  [97.8 °F (36.6 °C)-98.7 °F (37.1 °C)]   Pulse:  [81-99]   Resp:  [14-20]   BP: (103-122)/(62-83)   SpO2:  [97 %-98 %]     Physical Exam:  General: Oriented to person, place, time, and situation. No acute distress.  Vascular: DP and PT pulses are 2+ bilaterally. CRT<3 seconds to digits 1-5 bilaterally. Marked edema to the right LE   Musculoskeletal: Equinus noted b/l ankles with < 10 deg DF noted. Strength 5/5 in DF/PF/Inv/Ev resistance with no reproduction of pain in any direction.   Neuro: Protective sensation absent bilateral   Derm: No open lesions, lacerations or wounds noted to the left foot.     Right Foot    Ulcer Location: Plantar first MTPJ  Measurements: 2.2 x 2.0 x 0.3 cm, probes to the dorsal foot and to bone  Periwound: Intact, hyperkeratotic  Drainage: Malodorous and scant purulence   Malodor: Present  Base:  100% granular.   Signs of infection: Slight erythema, scant purulence, malodor, probes to bone.    Ulcer Location: Plantar PIPJ  Measurements:1.0x1.0x0.3,  probes deep to bone  Periwound:  Intact  Drainage: serosanguinous.  Pus: scant.  Malodor: Present.  Base:  100% granular.  Signs of infection: Malodor and erythema.    Ulcer Location: Plantar distal phalanx of hallux  Measurements:.5x.5x0.4 cm  Periwound: Intact  Drainage: purulent  Pus: present  Malodor: Present.  Base:  100% granular, probes to bone  Signs of infection: Malodor and erythema, purulence                             Laboratory:  CBC:     Recent Labs  Lab 01/13/17 0411   WBC 5.51   RBC 3.76*   HGB 10.7*   HCT 33.3*      MCV 89   MCH 28.5   MCHC 32.1     CMP:     Recent Labs  Lab 01/12/17 0228 01/13/17 0411   * 277*   CALCIUM 9.9 9.3   ALBUMIN 2.7*  --    PROT 7.9  --     137   K 4.4 3.7   CO2 26 28   CL 96 99   BUN 16 8   CREATININE 1.1 0.8   ALKPHOS 109  --    ALT 7*  --    AST 12  --    BILITOT 0.3  --      ESR:     Recent Labs  Lab 01/12/17 0228   SEDRATE 113*     CRP:     Recent Labs  Lab 01/12/17 0228   CRP 19.6*     Microbiology Results (last 7 days)     Procedure Component Value Units Date/Time    Aerobic culture (Specify Source) **CANNOT BE ORDERED AS STAT** [[id number]] Collected:  01/12/17 0433    Order Status:  Completed Specimen:  Bone from Foot, Right Updated:  01/13/17 1229     Aerobic Bacterial Culture --     STREPTOCOCCUS AGALACTIAE (GROUP B)  Few  Susceptibility testing not routinely performed      Blood culture [[id number]] Collected:  01/12/17 0230    Order Status:  Completed Specimen:  Blood from Peripheral, Right  Arm Updated:  01/13/17 0812     Blood Culture, Routine No Growth to date     Blood Culture, Routine No Growth to date    Blood culture [[id number]] Collected:  01/12/17 0342    Order Status:  Completed Specimen:  Blood from Peripheral, Forearm, Left Updated:  01/13/17 0812     Blood Culture, Routine No Growth to date     Blood Culture, Routine No Growth to date    Gram stain [[id number]] Collected:  01/12/17 3956    Order Status:  Completed Specimen:  Abscess from Foot, Right  Updated:  01/12/17 2216     Gram Stain Result Rare WBC's      Few Gram positive cocci    Aerobic culture [[id number]] Collected:  01/12/17 1505    Order Status:  Sent Specimen:  Abscess from Foot, Right Updated:  01/12/17 2032    Culture, Anaerobe [[id number]] Collected:  01/12/17 1505    Order Status:  Sent Specimen:  Abscess from Foot, Right Updated:  01/12/17 2032    Blood culture [[id number]]     Order Status:  Completed Specimen:  Blood     Blood culture [[id number]]     Order Status:  Canceled Specimen:  Blood           Diagnostic Results:  X-Ray: reviewed  MRI: pending     Right foot x-ray:   Interval marked deformity and destruction about the first MTP joint involving the base of the first proximal phalanx and head of the first metatarsal highly concerning for septic arthritis/osteomyelitis. There is associated overlying cellulitis with focal plantar ulceration.    Interval partial amputation of the fifth metatarsal, noting slight irregularity with periosteal reaction involving the distal most portion of the remaining fifth metatarsal concerning for osteomyelitis.    MRI, right foot:   Status post metatarsal amputation of 5th ray.  There is advanced bone marrow replacement of the 1st ray involving the majority of the metatarsal as well as the phalanges.  There is associated cortical destruction, fragmentation of the 1st metatarsal head and extensive soft tissue edema.  There is a plantar ulcer in the region of the MTP joint with a 3 x 3 x 2 cm abscess replacing the joint.  Additional small ulcer noted at the distal aspect of the toe.  Remaining rays demonstrate preserved marrow signal, without evidence for fracture or osteomyelitis.  Lisfranc ligament is intact.  1.  Great toe osteomyelitis involving the phalanges and majority of metatarsal.  Plantar ulcer with abscess at the MTP joint.  Additional small ulcer at the distal aspect of the toe.    ALISSA:The right ankle brachial index was 1.14 which is normal or  elevated  The left ankle brachial index was 1.28 which is normal.   The waveforms of both lower extremities by Doppler and PVR's are diminished throughout suggesting arterial disease more significant than the resting ALISSA reflects. 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       ASSESSMENT/PLAN     Assessment:  -DM II with peripheral neuropathy  -Diabetic foot ulcer, right  -Diabetic foot infection with acute on chronic osteomyelitis of 1st ray.    Plan:    -The wounds were irrigated and packed open with mepilex AG.   -Aerobic, anaerobic cultures and gram stain, in process  -ID on board, appreciate their recommendations   -Will likely require a first ray amputation with the amount of bone loss noted on x-ray secondary to infection. Discussed this with patient in detail. He is reluctant to undergo amputation at this point. He does agree to I&D and bone biopsy which we will schedule for Monday morning. Will also consider achilles lengthening to decrease plantar forefoot pressures and promote healing. Will revisit likely need for ray amputation.      Maureen Gonzalez  DPM PGY-1  [phone]    I have reviewed and concur with the resident's history, physical, assessment, and plan.  I have personally interviewed and examined the patient at bedside.

## 2017-01-14 LAB
BACTERIA SPEC AEROBE CULT: NORMAL
POCT GLUCOSE: 108 MG/DL (ref 70–110)
POCT GLUCOSE: 151 MG/DL (ref 70–110)
POCT GLUCOSE: 205 MG/DL (ref 70–110)
POCT GLUCOSE: 318 MG/DL (ref 70–110)

## 2017-01-14 PROCEDURE — 25000003 PHARM REV CODE 250: Performed by: NURSE PRACTITIONER

## 2017-01-14 PROCEDURE — 99233 SBSQ HOSP IP/OBS HIGH 50: CPT | Mod: ,,, | Performed by: PODIATRIST

## 2017-01-14 PROCEDURE — 25000003 PHARM REV CODE 250: Performed by: INTERNAL MEDICINE

## 2017-01-14 PROCEDURE — 63600175 PHARM REV CODE 636 W HCPCS: Performed by: NURSE PRACTITIONER

## 2017-01-14 PROCEDURE — 11000001 HC ACUTE MED/SURG PRIVATE ROOM

## 2017-01-14 RX ADMIN — MICONAZOLE NITRATE: 20 CREAM TOPICAL at 10:01

## 2017-01-14 RX ADMIN — PIPERACILLIN SODIUM AND TAZOBACTAM SODIUM 4.5 G: 4; .5 INJECTION, POWDER, LYOPHILIZED, FOR SOLUTION INTRAVENOUS at 01:01

## 2017-01-14 RX ADMIN — PIPERACILLIN SODIUM AND TAZOBACTAM SODIUM 4.5 G: 4; .5 INJECTION, POWDER, LYOPHILIZED, FOR SOLUTION INTRAVENOUS at 05:01

## 2017-01-14 RX ADMIN — VANCOMYCIN HYDROCHLORIDE 1500 MG: 1 INJECTION, POWDER, LYOPHILIZED, FOR SOLUTION INTRAVENOUS at 09:01

## 2017-01-14 RX ADMIN — INSULIN ASPART 2 UNITS: 100 INJECTION, SOLUTION INTRAVENOUS; SUBCUTANEOUS at 12:01

## 2017-01-14 RX ADMIN — INSULIN DETEMIR 15 UNITS: 100 INJECTION, SOLUTION SUBCUTANEOUS at 08:01

## 2017-01-14 RX ADMIN — ENOXAPARIN SODIUM 40 MG: 100 INJECTION SUBCUTANEOUS at 12:01

## 2017-01-14 RX ADMIN — INSULIN ASPART 8 UNITS: 100 INJECTION, SOLUTION INTRAVENOUS; SUBCUTANEOUS at 12:01

## 2017-01-14 RX ADMIN — PIPERACILLIN SODIUM AND TAZOBACTAM SODIUM 4.5 G: 4; .5 INJECTION, POWDER, LYOPHILIZED, FOR SOLUTION INTRAVENOUS at 08:01

## 2017-01-14 RX ADMIN — MICONAZOLE NITRATE: 20 CREAM TOPICAL at 08:01

## 2017-01-14 RX ADMIN — INSULIN ASPART 8 UNITS: 100 INJECTION, SOLUTION INTRAVENOUS; SUBCUTANEOUS at 04:01

## 2017-01-14 RX ADMIN — INSULIN ASPART 4 UNITS: 100 INJECTION, SOLUTION INTRAVENOUS; SUBCUTANEOUS at 08:01

## 2017-01-14 RX ADMIN — INSULIN ASPART 8 UNITS: 100 INJECTION, SOLUTION INTRAVENOUS; SUBCUTANEOUS at 08:01

## 2017-01-14 NOTE — PROGRESS NOTES
"[facility]  Progress Note    Patient Name: Indio Ryder Jr.  MRN: [id number]  Patient Class: IP- Inpatient   Admission Date: 1/12/2017  Length of Stay: 2 days  Attending Physician: Simon Kelly MD  Primary Care Provider: Lorena Thakur MD        Subjective:     Principal Problem:Subacute osteomyelitis of right foot    HPI:  Indio Ryder Jr. Is a 46 y.o.  man with hypertension and diabetes mellitus type 2 with peripheral neuropathy, s/p 5th toe ray amputation on 6/05/15. He lives in [place], [place]. He is single. He works in [employer] at [employer]. His primary care physician is Dr. Lorena Thakur. His podiatrist is Dr. William Manuel.    He lost his insurance and was unable to continue following up with Dr. Manuel in October 2016. He had taken ciprofloxacin and metronidazole for 6 months up until then. He was performing wound care himself at home "a couple of times per week". He noticed malodorous drainage, redness and swelling of his right foot prior to Marc, accompanied by fever, chills, and fatigue. He regained insurance beginning January 1, 2017.  Dr. Manuel saw him in his clinic on 1/10/17 and instructed him to go to an ED for IV antibiotics and to have "Dr. Matthew take a look at it". Dr. Matthew is a general surgeon at [facility]. He went to [facility], where there are also podiatrists, on 1/12/17.    He was afebrile, but pulse was 110s. WBC count was 9520 and hemoglobin and hematocrit were 11.8 and 37. ESR was 113, CRP was 19.6. He was hyperglycemic (glucose 421). X-ray of the right foot showed cellulitis and osteomyelitis of the right 1st and 5th metatarsals. He was given 1 liter normal saline, ceftriaxone, and vancomycin in the ED. He was admitted to [facility].       Hospital Course:  Vancomycin was continued and ceftriaxone changed to piperacillin-tazobactam. Podiatry was " consulted and performed bedside debridement and culture. He reported that he had been hyperglycemic recently due to the infection. Hemoglobin A1c was 15%. MRI showed osteomyelitis of most of the 1st metatarsal and the phalanges. Streptococcus agalactiae grew in bedside wound culture.     Interval History: Hyperglycemic.    Review of Systems   Constitutional: Negative for chills and fever.   Cardiovascular: Negative for chest pain and palpitations.     Objective:     Vital Signs (Most Recent):  Temp: 98.5 °F (36.9 °C) (01/14/17 0800)  Pulse: 86 (01/14/17 0800)  Resp: 18 (01/14/17 0800)  BP: 116/75 (01/14/17 0800)  SpO2: 97 % (01/13/17 0813) Vital Signs (24h Range):  Temp:  [98 °F (36.7 °C)-98.6 °F (37 °C)] 98.5 °F (36.9 °C)  Pulse:  [79-89] 86  Resp:  [14-20] 18  BP: (109-127)/(66-83) 116/75     Weight: 93 kg (205 lb)  Body mass index is 27.05 kg/(m^2).    Intake/Output Summary (Last 24 hours) at 01/14/17 1030  Last data filed at 01/14/17 0959   Gross per 24 hour   Intake             1120 ml   Output             1100 ml   Net               20 ml      Physical Exam   Constitutional: He is oriented to person, place, and time. He appears well-developed and well-nourished.   Cardiovascular: Normal rate and regular rhythm.    Pulmonary/Chest: Effort normal. No respiratory distress.   Musculoskeletal: He exhibits edema.   Neurological: He is alert and oriented to person, place, and time.   Psychiatric: He has a normal mood and affect.   Nursing note and vitals reviewed.      Significant Labs: All pertinent labs within the past 24 hours have been reviewed.    Significant Imaging: None new      Assessment/Plan:      * Subacute osteomyelitis of right foot  Diabetic ulcer of right foot associated with type 2 diabetes mellitus  Continue piperacillin-tazobactam empirically. Streptococcus agalactiae grew in bedside wound culture. Can get further culture with debridement. He wants to avoid amputation for now. Appreciate Infectious  Disease and Podiatry.      Essential hypertension  Not requiring medications at this time.      Type 2 diabetes mellitus with diabetic neuropathy, with long-term current use of insulin  Takes metformin 500 mg BID and insulin glargine 25 units HS at home. Hemoglobin A1c 15%. Giving insulin detemir 25 units HS, insulin aspart 8 units TID with meals, moderate dose insulin sliding scale. Add insulin detemir 15 units daily.      VTE Risk Mitigation         Ordered     enoxaparin injection 40 mg  Daily     Route:  Subcutaneous        01/12/17 1239     Medium Risk of VTE  Once      01/12/17 1239          Simon Kelly MD  Department of Hospital Medicine   [facility]

## 2017-01-14 NOTE — SUBJECTIVE & OBJECTIVE
Interval History: Hyperglycemic.    Review of Systems   Constitutional: Negative for chills and fever.   Cardiovascular: Negative for chest pain and palpitations.     Objective:     Vital Signs (Most Recent):  Temp: 98.5 °F (36.9 °C) (01/14/17 0800)  Pulse: 86 (01/14/17 0800)  Resp: 18 (01/14/17 0800)  BP: 116/75 (01/14/17 0800)  SpO2: 97 % (01/13/17 0813) Vital Signs (24h Range):  Temp:  [98 °F (36.7 °C)-98.6 °F (37 °C)] 98.5 °F (36.9 °C)  Pulse:  [79-89] 86  Resp:  [14-20] 18  BP: (109-127)/(66-83) 116/75     Weight: 93 kg (205 lb)  Body mass index is 27.05 kg/(m^2).    Intake/Output Summary (Last 24 hours) at 01/14/17 1030  Last data filed at 01/14/17 0959   Gross per 24 hour   Intake             1120 ml   Output             1100 ml   Net               20 ml      Physical Exam   Constitutional: He is oriented to person, place, and time. He appears well-developed and well-nourished.   Cardiovascular: Normal rate and regular rhythm.    Pulmonary/Chest: Effort normal. No respiratory distress.   Musculoskeletal: He exhibits edema.   Neurological: He is alert and oriented to person, place, and time.   Psychiatric: He has a normal mood and affect.   Nursing note and vitals reviewed.      Significant Labs: All pertinent labs within the past 24 hours have been reviewed.    Significant Imaging: None new

## 2017-01-14 NOTE — PLAN OF CARE
Pt stable resting quietly in bed with NAD noted. RLE elevated, swelling noted. Dressing clean,dry and intact.Pt denies any present pain or discomfort. Family at bedside. Will cont to monitor

## 2017-01-14 NOTE — PROGRESS NOTES
[facility]  Infectious Disease  Progress Note    Patient Name: Indio Ryder Jr.  MRN: [id number]  Admission Date: 1/12/2017  Length of Stay: 2 days  Attending Physician: Simon Kelly MD  Primary Care Provider: Lorena Thakur MD    Isolation Status: No active isolations  Assessment/Plan:      * Subacute osteomyelitis of right foot  47 y/o male with DM II and right foot ulcerations - history of longstanding ulcerations of this foot over 3 years - do not have old records. Patient had been on cipro and flagyl for about 6 months up until October 2016. He just restarted them PTA - had a few left at home.      Patient on vanco and zosyn empirically  So far group B strep from superficial cultures  Have discontinued vanco 1/14, continue zosyn for now     Need to see if patient has gotten tetanus booster - if not needs tdap       Group B streptococcal infection  Right foot positive for GBS - superficial cultures    Diabetic ulcer of right foot associated with type 2 diabetes mellitus  Management of DM per primary    Tinea pedis of left foot  Topical antifungal prescribed      Anticipated Disposition: Patient will likely need surgery/amputation    Thank you for your consult. I will follow-up with patient. Please contact us if you have any additional questions.[phone]    Subjective:     Principal Problem:Subacute osteomyelitis of right foot    Interval History: Patient with no complaints. Have discontinued vanco today since no MRSA isolated, So far only group B strep from foot cultures. Podiatry performed bedside I + D and has talked to patient about amputation but he has not agreed at present.     HPI:  47 y/o male with HTN, DM II, with peripheral neuropathy. Patient has had diabetic ulcer of right foot followed by podiatry as outpatient (Dr. William Manuel) up until October when patient lost insurance benefits. Patient was doing own wound care a few times a week. He noticed malodorous drainage and  swelling and erythema in December prior to ann. Patient regained insurance Jan 1, 2017 and went to podiatry on 1/10 and he was instructed to come to the ED. Patient presented to ED 1/12/17 - x ray of right foot concerning for osteo of 1st and 5th MT and cellulitis. Patient was given 1L NS and vanco and ceftriaxone in ED and admitted to hospital medicine. ED reports patient has history of 3 years of ongoing ulcer to right foot. MRI of foot ordered. Podiatry consulted. Patient placed on vanco and zosyn on admit.     Patient stated he had been on po cipro and flagyl for about 6 months until October. He had a few antibiotic pills left and he took them recently when he started to have drainage from right foot PTA. Patient does not recall last tetanus shot.     1/12 right foot culture - positive for group B strep  1/14 vancomycin discontinued, zosyn continued            Review of Systems   Respiratory: Negative for shortness of breath.    Gastrointestinal: Negative for diarrhea, nausea and vomiting.     Objective:     Vital Signs (Most Recent):  Temp: 98 °F (36.7 °C) (01/14/17 1200)  Pulse: 82 (01/14/17 1200)  Resp: 18 (01/14/17 1200)  BP: 115/64 (01/14/17 1200)  SpO2: 97 % (01/13/17 0813) Vital Signs (24h Range):  Temp:  [98 °F (36.7 °C)-98.6 °F (37 °C)] 98 °F (36.7 °C)  Pulse:  [79-89] 82  Resp:  [14-20] 18  BP: (109-127)/(64-83) 115/64     Weight: 93 kg (205 lb)  Body mass index is 27.05 kg/(m^2).    Estimated Creatinine Clearance: 127.6 mL/min (based on Cr of 0.8).    Physical Exam   Cardiovascular: Regular rhythm and normal heart sounds.    Pulmonary/Chest: Breath sounds normal. No respiratory distress. He has no wheezes.   Abdominal: Soft. Bowel sounds are normal. He exhibits no distension. There is no tenderness.   Musculoskeletal: He exhibits no edema.   Right foot bandaged       Significant Labs:     1/12 right foot culture - GPC - no significant isolate to date  1/12 right foot culture - group B strep and  skin janes    Blood Culture:   Recent Labs  Lab 01/12/17  0230 01/12/17  0342   LABBLOO No Growth to date  No Growth to date  No Growth to date No Growth to date  No Growth to date  No Growth to date     CBC:   Recent Labs  Lab 01/13/17  0411   WBC 5.51   HGB 10.7*   HCT 33.3*        CMP:   Recent Labs  Lab 01/13/17  0411      K 3.7   CL 99   CO2 28   *   BUN 8   CREATININE 0.8   CALCIUM 9.3   ANIONGAP 10   EGFRNONAA >60     Urine Studies:   Recent Labs  Lab 01/12/17  1341   COLORU Yellow   APPEARANCEUA Clear   PHUR 6.0   SPECGRAV 1.025   PROTEINUA Negative   GLUCUA 3+*   KETONESU 1+*   BILIRUBINUA Negative   OCCULTUA Negative   NITRITE Negative   UROBILINOGEN Negative   LEUKOCYTESUR Negative   BACTERIA None       Significant Imaging:         Rola Mckinney MD  Infectious Disease  [facility]

## 2017-01-14 NOTE — SUBJECTIVE & OBJECTIVE
Interval History: Patient with no complaints. Have discontinued vanco today since no MRSA isolated, So far only group B strep from foot cultures. Podiatry performed bedside I + D and has talked to patient about amputation but he has not agreed at present.     HPI:  47 y/o male with HTN, DM II, with peripheral neuropathy. Patient has had diabetic ulcer of right foot followed by podiatry as outpatient (Dr. William Manuel) up until October when patient lost insurance benefits. Patient was doing own wound care a few times a week. He noticed malodorous drainage and swelling and erythema in December prior to ann. Patient regained insurance Jan 1, 2017 and went to podiatry on 1/10 and he was instructed to come to the ED. Patient presented to ED 1/12/17 - x ray of right foot concerning for osteo of 1st and 5th MT and cellulitis. Patient was given 1L NS and vanco and ceftriaxone in ED and admitted to hospital medicine. ED reports patient has history of 3 years of ongoing ulcer to right foot. MRI of foot ordered. Podiatry consulted. Patient placed on vanco and zosyn on admit.     Patient stated he had been on po cipro and flagyl for about 6 months until October. He had a few antibiotic pills left and he took them recently when he started to have drainage from right foot PTA. Patient does not recall last tetanus shot.     1/12 right foot culture - positive for group B strep  1/14 vancomycin discontinued, zosyn continued            Review of Systems   Respiratory: Negative for shortness of breath.    Gastrointestinal: Negative for diarrhea, nausea and vomiting.     Objective:     Vital Signs (Most Recent):  Temp: 98 °F (36.7 °C) (01/14/17 1200)  Pulse: 82 (01/14/17 1200)  Resp: 18 (01/14/17 1200)  BP: 115/64 (01/14/17 1200)  SpO2: 97 % (01/13/17 0813) Vital Signs (24h Range):  Temp:  [98 °F (36.7 °C)-98.6 °F (37 °C)] 98 °F (36.7 °C)  Pulse:  [79-89] 82  Resp:  [14-20] 18  BP: (109-127)/(64-83) 115/64     Weight: 93 kg  (205 lb)  Body mass index is 27.05 kg/(m^2).    Estimated Creatinine Clearance: 127.6 mL/min (based on Cr of 0.8).    Physical Exam   Cardiovascular: Regular rhythm and normal heart sounds.    Pulmonary/Chest: Breath sounds normal. No respiratory distress. He has no wheezes.   Abdominal: Soft. Bowel sounds are normal. He exhibits no distension. There is no tenderness.   Musculoskeletal: He exhibits no edema.   Right foot bandaged       Significant Labs:     1/12 right foot culture - GPC - no significant isolate to date  1/12 right foot culture - group B strep and skin janes    Blood Culture:   Recent Labs  Lab 01/12/17  0230 01/12/17  0342   LABBLOO No Growth to date  No Growth to date  No Growth to date No Growth to date  No Growth to date  No Growth to date     CBC:   Recent Labs  Lab 01/13/17  0411   WBC 5.51   HGB 10.7*   HCT 33.3*        CMP:   Recent Labs  Lab 01/13/17  0411      K 3.7   CL 99   CO2 28   *   BUN 8   CREATININE 0.8   CALCIUM 9.3   ANIONGAP 10   EGFRNONAA >60     Urine Studies:   Recent Labs  Lab 01/12/17  1341   COLORU Yellow   APPEARANCEUA Clear   PHUR 6.0   SPECGRAV 1.025   PROTEINUA Negative   GLUCUA 3+*   KETONESU 1+*   BILIRUBINUA Negative   OCCULTUA Negative   NITRITE Negative   UROBILINOGEN Negative   LEUKOCYTESUR Negative   BACTERIA None       Significant Imaging:

## 2017-01-14 NOTE — PLAN OF CARE
Problem: Patient Care Overview  Goal: Plan of Care Review  Outcome: Ongoing (interventions implemented as appropriate)  Plan of care reviewed with the patient. Verbalized clear understanding. Bed in lowest position. Call light within reach. Instructed to call for assistance. Denies any pain or discomfort. NSR HR between 80's-90's on telemetry monitor.  No report of SOB or lightheadedness. Will continue to monitor.

## 2017-01-14 NOTE — ASSESSMENT & PLAN NOTE
47 y/o male with DM II and right foot ulcerations - history of longstanding ulcerations of this foot over 3 years - do not have old records. Patient had been on cipro and flagyl for about 6 months up until October 2016. He just restarted them PTA - had a few left at home.      Patient on vanco and zosyn empirically  So far group B strep from superficial cultures  Have discontinued vanco 1/14, continue zosyn for now     Need to see if patient has gotten tetanus booster - if not needs tdap

## 2017-01-14 NOTE — ASSESSMENT & PLAN NOTE
Diabetic ulcer of right foot associated with type 2 diabetes mellitus  Continue piperacillin-tazobactam empirically. Streptococcus agalactiae grew in bedside wound culture. Can get further culture with debridement. He wants to avoid amputation for now. Appreciate Infectious Disease and Podiatry.

## 2017-01-14 NOTE — ASSESSMENT & PLAN NOTE
Takes metformin 500 mg BID and insulin glargine 25 units HS at home. Hemoglobin A1c 15%. Giving insulin detemir 25 units HS, insulin aspart 8 units TID with meals, moderate dose insulin sliding scale. Add insulin detemir 15 units daily.

## 2017-01-15 ENCOUNTER — ANESTHESIA EVENT (OUTPATIENT)
Dept: SURGERY | Facility: HOSPITAL | Age: 47
DRG: 617 | End: 2017-01-15
Payer: MEDICAID

## 2017-01-15 PROBLEM — M86.9 OSTEOMYELITIS OF RIGHT FOOT: Status: ACTIVE | Noted: 2017-01-15

## 2017-01-15 LAB
POCT GLUCOSE: 131 MG/DL (ref 70–110)
POCT GLUCOSE: 245 MG/DL (ref 70–110)
POCT GLUCOSE: 269 MG/DL (ref 70–110)
POCT GLUCOSE: 99 MG/DL (ref 70–110)

## 2017-01-15 PROCEDURE — 25000003 PHARM REV CODE 250: Performed by: NURSE PRACTITIONER

## 2017-01-15 PROCEDURE — 11000001 HC ACUTE MED/SURG PRIVATE ROOM

## 2017-01-15 PROCEDURE — 63600175 PHARM REV CODE 636 W HCPCS: Performed by: NURSE PRACTITIONER

## 2017-01-15 RX ORDER — INSULIN ASPART 100 [IU]/ML
8 INJECTION, SOLUTION INTRAVENOUS; SUBCUTANEOUS
Status: DISCONTINUED | OUTPATIENT
Start: 2017-01-16 | End: 2017-01-23 | Stop reason: HOSPADM

## 2017-01-15 RX ORDER — INSULIN ASPART 100 [IU]/ML
10 INJECTION, SOLUTION INTRAVENOUS; SUBCUTANEOUS
Status: DISCONTINUED | OUTPATIENT
Start: 2017-01-15 | End: 2017-01-15

## 2017-01-15 RX ADMIN — PIPERACILLIN SODIUM AND TAZOBACTAM SODIUM 4.5 G: 4; .5 INJECTION, POWDER, LYOPHILIZED, FOR SOLUTION INTRAVENOUS at 09:01

## 2017-01-15 RX ADMIN — INSULIN ASPART 4 UNITS: 100 INJECTION, SOLUTION INTRAVENOUS; SUBCUTANEOUS at 08:01

## 2017-01-15 RX ADMIN — INSULIN DETEMIR 15 UNITS: 100 INJECTION, SOLUTION SUBCUTANEOUS at 08:01

## 2017-01-15 RX ADMIN — PIPERACILLIN SODIUM AND TAZOBACTAM SODIUM 4.5 G: 4; .5 INJECTION, POWDER, LYOPHILIZED, FOR SOLUTION INTRAVENOUS at 01:01

## 2017-01-15 RX ADMIN — INSULIN ASPART 10 UNITS: 100 INJECTION, SOLUTION INTRAVENOUS; SUBCUTANEOUS at 12:01

## 2017-01-15 RX ADMIN — MICONAZOLE NITRATE: 20 CREAM TOPICAL at 09:01

## 2017-01-15 RX ADMIN — PIPERACILLIN SODIUM AND TAZOBACTAM SODIUM 4.5 G: 4; .5 INJECTION, POWDER, LYOPHILIZED, FOR SOLUTION INTRAVENOUS at 04:01

## 2017-01-15 RX ADMIN — ENOXAPARIN SODIUM 40 MG: 100 INJECTION SUBCUTANEOUS at 11:01

## 2017-01-15 RX ADMIN — INSULIN ASPART 8 UNITS: 100 INJECTION, SOLUTION INTRAVENOUS; SUBCUTANEOUS at 08:01

## 2017-01-15 RX ADMIN — MICONAZOLE NITRATE: 20 CREAM TOPICAL at 08:01

## 2017-01-15 NOTE — ASSESSMENT & PLAN NOTE
Takes metformin 500 mg BID and insulin glargine 25 units HS at home. Hemoglobin A1c 15%. Giving insulin detemir 15 units am 25 units HS, insulin aspart 8 units TID with meals, moderate dose insulin sliding scale. Increase insulin aspart 8 to 10 units TID with meals.

## 2017-01-15 NOTE — PROGRESS NOTES
Progress Note  Podiatry    Consult Requested By: Simon Kelly MD  Reason for Consult: right foot osteomyelitis    SUBJECTIVE     History of Present Illness:  Indio Ryder Jr. is a 46 y.o. male who  has a past medical history of Diabetes mellitus type II; Diabetic foot ulcer; Hyperlipidemia; Hypertension; Peripheral neuropathy; and Ulcerative colitis, unspecified. He has had an ulcer sub first MTPJ right foot off and on for several years. He is a patient of Dr. Manuel's outpatient who sent him in to the ED for osteomyelitis. Patient relates that he did not see Dr. Manuel for a long period prior to this most recent visit due to insurance reasons. He has been trying to treat this ulcer himself bandaging it daily. Denies f/c/n/v. History of partial 5th ray amputation about 2 yrs ago with no complication.    1/13/17: Pt s/p day 1 bedside I&D for abscess. Pt denies any pain. Denies N/V/F/C. No complaints at this time.     1/14/17: Pt s/p day 2 bedside I&D. Pt denies any pain. No complaints at this time.    1/15/17: Pt s/p day 3 bedside I&D. Denies pain. Pt NPO at midnight. No complaints at this time.     Scheduled Meds:   enoxaparin  40 mg Subcutaneous Daily    insulin aspart  10 Units Subcutaneous TIDWM    insulin detemir  15 Units Subcutaneous Daily    insulin detemir  25 Units Subcutaneous QHS    miconazole   Topical (Top) BID    piperacillin-tazobactam 4.5 g in dextrose 5 % 100 mL IVPB (ready to mix system)  4.5 g Intravenous Q8H     Continuous Infusions:     PRN Meds:acetaminophen, dextrose 50%, dextrose 50%, glucagon (human recombinant), glucose, glucose, insulin aspart, ondansetron, oxycodone, oxycodone, promethazine (PHENERGAN) IVPB, ramelteon    Review of patient's allergies indicates:  No Known Allergies     Past Medical History   Diagnosis Date    Diabetes mellitus type II     Diabetic foot ulcer     Hyperlipidemia     Hypertension     Peripheral neuropathy     Ulcerative colitis,  unspecified      Past Surgical History   Procedure Laterality Date    Toe amputation Right 06/05/2015     Family History   Problem Relation Age of Onset    Adopted: Yes    Hypertension Mother     Cancer Mother      breast    Diabetes Mother     Hypertension Father     Cataracts Father     Diabetes Sister     Hypertension Maternal Aunt      Social History   Substance Use Topics    Smoking status: Never Smoker    Smokeless tobacco: None    Alcohol use No       Review of Systems:  Constitutional: no fever or chills, pain well controlled  Respiratory: no cough or shortness of breath  Cardiovascular: no chest pain or palpitations  Gastrointestinal: no nausea or vomiting, tolerating diet  Musculoskeletal: no arthralgias or myalgias  Neurological: history of numbness or paresthesias in the feet    OBJECTIVE     Vital Signs (Most Recent):  Temp: 97.8 °F (36.6 °C) (01/15/17 1200)  Pulse: 80 (01/15/17 1200)  Resp: 18 (01/15/17 1200)  BP: 129/85 (01/15/17 1200)  SpO2: 97 % (01/13/17 0813)    Vital Signs Range (Last 24H):  Temp:  [97.8 °F (36.6 °C)-98.5 °F (36.9 °C)]   Pulse:  [75-95]   Resp:  [18-20]   BP: (103-129)/(64-85)     Physical Exam:  General: Oriented to person, place, time, and situation. No acute distress.  Vascular: DP and PT pulses are 2+ bilaterally. CRT<3 seconds to digits 1-5 bilaterally. Marked edema to the right LE   Musculoskeletal: Equinus noted b/l ankles with < 10 deg DF noted. Strength 5/5 in DF/PF/Inv/Ev resistance with no reproduction of pain in any direction.   Neuro: Protective sensation absent bilateral   Derm: No open lesions, lacerations or wounds noted to the left foot.     Right Foot    Ulcer Location: Plantar first MTPJ  Measurements: 2.2 x 2.0 x 0.3 cm, probes to the dorsal foot and to bone  Periwound: Intact, hyperkeratotic  Drainage: Malodorous and scant purulence   Malodor: Present  Base:  100% granular.   Signs of infection: Slight erythema, scant purulence, malodor, probes to  bone.    Ulcer Location: Plantar PIPJ  Measurements:1.0x1.0x0.3,  probes deep to bone  Periwound: Intact  Drainage: serosanguinous.  Pus: scant.  Malodor: Present.  Base:  100% granular.  Signs of infection: Malodor and erythema.    Ulcer Location: Plantar distal phalanx of hallux  Measurements:.5x.5x0.4 cm  Periwound: Intact  Drainage: purulent  Pus: present  Malodor: Present.  Base:  100% granular, probes to bone  Signs of infection: Malodor and erythema, purulence                             Laboratory:  CBC:     Recent Labs  Lab 01/13/17 0411   WBC 5.51   RBC 3.76*   HGB 10.7*   HCT 33.3*      MCV 89   MCH 28.5   MCHC 32.1     CMP:     Recent Labs  Lab 01/12/17 0228 01/13/17 0411   * 277*   CALCIUM 9.9 9.3   ALBUMIN 2.7*  --    PROT 7.9  --     137   K 4.4 3.7   CO2 26 28   CL 96 99   BUN 16 8   CREATININE 1.1 0.8   ALKPHOS 109  --    ALT 7*  --    AST 12  --    BILITOT 0.3  --      ESR:     Recent Labs  Lab 01/12/17 0228   SEDRATE 113*     CRP:     Recent Labs  Lab 01/12/17 0228   CRP 19.6*     Microbiology Results (last 7 days)     Procedure Component Value Units Date/Time    Blood culture [[id number]] Collected:  01/12/17 0342    Order Status:  Completed Specimen:  Blood from Peripheral, Forearm, Left Updated:  01/15/17 0812     Blood Culture, Routine No Growth to date     Blood Culture, Routine No Growth to date     Blood Culture, Routine No Growth to date     Blood Culture, Routine No Growth to date    Blood culture [[id number]] Collected:  01/12/17 0230    Order Status:  Completed Specimen:  Blood from Peripheral, Right  Arm Updated:  01/15/17 0812     Blood Culture, Routine No Growth to date     Blood Culture, Routine No Growth to date     Blood Culture, Routine No Growth to date     Blood Culture, Routine No Growth to date    Aerobic culture (Specify Source) **CANNOT BE ORDERED AS STAT** [[id number]] Collected:  01/12/17 7703    Order Status:  Completed Specimen:  Bone from Foot,  Right Updated:  01/14/17 1134     Aerobic Bacterial Culture --     STREPTOCOCCUS AGALACTIAE (GROUP B)  Few  Susceptibility testing not routinely performed  Skin janes also present      Aerobic culture [[id number]] Collected:  01/12/17 1505    Order Status:  Completed Specimen:  Abscess from Foot, Right Updated:  01/14/17 0755     Aerobic Bacterial Culture No significant isolate to date    Gram stain [[id number]] Collected:  01/12/17 1505    Order Status:  Completed Specimen:  Abscess from Foot, Right Updated:  01/12/17 2216     Gram Stain Result Rare WBC's      Few Gram positive cocci    Culture, Anaerobe [[id number]] Collected:  01/12/17 1505    Order Status:  Sent Specimen:  Abscess from Foot, Right Updated:  01/12/17 2032    Blood culture [[id number]]     Order Status:  Completed Specimen:  Blood     Blood culture [[id number]]     Order Status:  Canceled Specimen:  Blood           Diagnostic Results:  X-Ray: reviewed  MRI: pending     Right foot x-ray:   Interval marked deformity and destruction about the first MTP joint involving the base of the first proximal phalanx and head of the first metatarsal highly concerning for septic arthritis/osteomyelitis. There is associated overlying cellulitis with focal plantar ulceration.    Interval partial amputation of the fifth metatarsal, noting slight irregularity with periosteal reaction involving the distal most portion of the remaining fifth metatarsal concerning for osteomyelitis.    MRI, right foot:   Status post metatarsal amputation of 5th ray.  There is advanced bone marrow replacement of the 1st ray involving the majority of the metatarsal as well as the phalanges.  There is associated cortical destruction, fragmentation of the 1st metatarsal head and extensive soft tissue edema.  There is a plantar ulcer in the region of the MTP joint with a 3 x 3 x 2 cm abscess replacing the joint.  Additional small ulcer noted at the distal aspect of the toe.  Remaining rays  demonstrate preserved marrow signal, without evidence for fracture or osteomyelitis.  Lisfranc ligament is intact.  1.  Great toe osteomyelitis involving the phalanges and majority of metatarsal.  Plantar ulcer with abscess at the MTP joint.  Additional small ulcer at the distal aspect of the toe.    ALISSA:The right ankle brachial index was 1.14 which is normal or elevated  The left ankle brachial index was 1.28 which is normal.   The waveforms of both lower extremities by Doppler and PVR's are diminished throughout suggesting arterial disease more significant than the resting ALISSA reflects. 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       ASSESSMENT/PLAN     Assessment:  -DM II with peripheral neuropathy  -Diabetic foot ulcer, right  -Diabetic foot infection with acute on chronic osteomyelitis of 1st ray.    Plan:    -The wounds were irrigated and packed open with mepilex AG. Nursing staff to change dressings daily, orders placed.  -Aerobic, anaerobic cultures and gram stain, in process  -ID on board, appreciate their recommendations  -Counseled on surgical correction, risks and benefits, including, but not limited to recurrence, infection, pain, scar, poor cosmesis, loss of function, nerve pain, nerve damage, numbness, syndromes (RSD), need for future surgical procedures, and or long term use of orthotics, blood clots of leg, lung, heart, brain, death.   -Obtained verbal and written consent for I&D with removal of infected bone. Consent placed in chart.  - Pt will likely require a first ray amputation with the amount of bone loss noted on x-ray secondary to infection. Discussed this with patient in detail. He is reluctant to undergo amputation at this point. He does agree to I&D and bone biopsy which we will schedule for Monday morning. Will also consider achilles lengthening to decrease plantar forefoot pressures and promote healing. Will revisit likely need for ray amputation.  -Pt NPO at midnight,  pre-operative labs ordered  -Appreciate the consult, podiatry will follow  -If questions, please contact the on-call podiatry resident      Maureen Gonzalez  DPM PGY-1  [phone]

## 2017-01-15 NOTE — ASSESSMENT & PLAN NOTE
Diabetic ulcer of right foot associated with type 2 diabetes mellitus  Continue piperacillin-tazobactam empirically. Streptococcus agalactiae grew in bedside wound culture. Appreciate Infectious Disease and Podiatry. To OR tomorrow.

## 2017-01-15 NOTE — ANESTHESIA PREPROCEDURE EVALUATION
01/15/2017  Indio Ryder Jr. is a 46 y.o., male with PMH poorly controlled DM2, HTN here for right foot I&D under general.    There were no vitals filed for this visit.    Review of patient's allergies indicates:  No Known Allergies    Past Medical History   Diagnosis Date    Diabetes mellitus type II     Diabetic foot ulcer     Hyperlipidemia     Hypertension     Peripheral neuropathy     Ulcerative colitis, unspecified      Past Surgical History   Procedure Laterality Date    Toe amputation Right 06/05/2015       Patient Active Problem List   Diagnosis    Essential hypertension    Shoulder impingement    Sacroiliac pain    Anemia    Hyperlipidemia    Noncompliance with medication regimen    Diabetic ulcer of right foot associated with type 2 diabetes mellitus    Traumatic amputation of fifth toe of right foot    Type 2 diabetes mellitus with diabetic neuropathy, with long-term current use of insulin    Bilateral leg edema, very severe on right    Tinea pedis of left foot    Subacute osteomyelitis of right foot    Group B streptococcal infection    Osteomyelitis of right foot     Lab Results   Component Value Date    WBC 5.51 01/13/2017    HGB 10.7 (L) 01/13/2017    HCT 33.3 (L) 01/13/2017     01/13/2017    CHOL 141 01/12/2017    TRIG 84 01/12/2017    HDL 43 01/12/2017    ALT 7 (L) 01/12/2017    AST 12 01/12/2017     01/13/2017    K 3.7 01/13/2017    CL 99 01/13/2017    CREATININE 0.8 01/13/2017    BUN 8 01/13/2017    CO2 28 01/13/2017    TSH 1.136 03/01/2012    PSA 0.65 08/11/2014    INR 1.1 01/12/2017    HGBA1C 15.0 (H) 01/12/2017     [facility] Anesthesia Evaluation    I have reviewed the Patient Summary Reports.    I have reviewed the Nursing Notes.   I have reviewed the Medications.     Review of Systems  Anesthesia Hx:  No problems with previous Anesthesia  Denies Family  Hx of Anesthesia complications.   Denies Personal Hx of Anesthesia complications.   Social:  Non-Smoker, No Alcohol Use    Cardiovascular:   Exercise tolerance: good Hypertension ECG has been reviewed. EKG 5/2016: Normal sinus rhythm  Normal ECG    11/2015: Negative exercise stress test     Neurological:   Peripheral Neuropathy    Endocrine:   Diabetes, poorly controlled, type 2, using insulin        Physical Exam  General:  Well nourished    Airway/Jaw/Neck:  Airway Findings: Mouth Opening: Normal Tongue: Normal  General Airway Assessment: Adult  Mallampati: III  Improves to II with phonation.  TM Distance: Normal, at least 6 cm  Jaw/Neck Findings:  Neck ROM: Normal ROM      Dental:  Dental Findings: In tact   Chest/Lungs:  Chest/Lungs Findings: Clear to auscultation, Normal Respiratory Rate     Heart/Vascular:  Heart Findings: Rate: Normal  Rhythm: Regular Rhythm  Sounds: Normal        Mental Status:  Mental Status Findings:  Cooperative, Alert and Oriented         Anesthesia Plan  Type of Anesthesia, risks & benefits discussed:  Anesthesia Type:  general, MAC  Patient's Preference:   Intra-op Monitoring Plan:   Intra-op Monitoring Plan Comments:   Post Op Pain Control Plan:   Post Op Pain Control Plan Comments:   Induction:   IV  Beta Blocker:  Patient is not currently on a Beta-Blocker (No further documentation required).       Informed Consent: Patient understands risks and agrees with Anesthesia plan.  Questions answered. Anesthesia consent signed with patient.  ASA Score: 3     Day of Surgery Review of History & Physical: I have interviewed and examined the patient. I have reviewed the patient's H&P dated:  There are no significant changes.  H&P update referred to the surgeon.         Ready For Surgery From Anesthesia Perspective.

## 2017-01-15 NOTE — SUBJECTIVE & OBJECTIVE
Interval History: Wound irrigated and packed by Podiatry yesterday.    Review of Systems   Constitutional: Negative for chills and fever.   Cardiovascular: Negative for chest pain and palpitations.     Objective:     Vital Signs (Most Recent):  Temp: 98.3 °F (36.8 °C) (01/15/17 0435)  Pulse: 79 (01/15/17 0435)  Resp: 18 (01/15/17 0435)  BP: 112/72 (01/15/17 0435)  SpO2: 97 % (01/13/17 0813) Vital Signs (24h Range):  Temp:  [98 °F (36.7 °C)-98.5 °F (36.9 °C)] 98.3 °F (36.8 °C)  Pulse:  [79-95] 79  Resp:  [18-20] 18  BP: (112-123)/(64-76) 112/72     Weight: 93 kg (205 lb)  Body mass index is 27.05 kg/(m^2).    Intake/Output Summary (Last 24 hours) at 01/15/17 0831  Last data filed at 01/14/17 2045   Gross per 24 hour   Intake              250 ml   Output             1125 ml   Net             -875 ml      Physical Exam   Constitutional: He is oriented to person, place, and time. He appears well-developed and well-nourished.   Cardiovascular: Normal rate and regular rhythm.    Pulmonary/Chest: Effort normal. No respiratory distress.   Musculoskeletal: He exhibits edema.   Neurological: He is alert and oriented to person, place, and time.   Psychiatric: He has a normal mood and affect.   Nursing note and vitals reviewed.      Significant Labs: All pertinent labs within the past 24 hours have been reviewed.    Significant Imaging: None new

## 2017-01-15 NOTE — PLAN OF CARE
Problem: Patient Care Overview  Goal: Plan of Care Review  Outcome: Ongoing (interventions implemented as appropriate)  Plan of care reviewed with the patient. Verbalized clear understanding. NPO after midnight. Bed in lowest position. Call light within reach. Instructed to call for assistance. Denies any pain or discomfort. NSR HR between 80's on telemetry monitor.  No report of SOB or lightheadedness. Will continue to monitor.

## 2017-01-15 NOTE — PROGRESS NOTES
Progress Note  Podiatry    Consult Requested By: Simon Kelly MD  Reason for Consult: right foot osteomyelitis    SUBJECTIVE     History of Present Illness:  Indio Ryder Jr. is a 46 y.o. male who  has a past medical history of Diabetes mellitus type II; Diabetic foot ulcer; Hyperlipidemia; Hypertension; Peripheral neuropathy; and Ulcerative colitis, unspecified. He has had an ulcer sub first MTPJ right foot off and on for several years. He is a patient of Dr. Manuel's outpatient who sent him in to the ED for osteomyelitis. Patient relates that he did not see Dr. Manuel for a long period prior to this most recent visit due to insurance reasons. He has been trying to treat this ulcer himself bandaging it daily. Denies f/c/n/v. History of partial 5th ray amputation about 2 yrs ago with no complication.    1/13/16: Pt s/p day 1 bedside I&D for abscess. Pt denies any pain. Denies N/V/F/C. No complaints at this time.     1/14/16: Pt s/p day 2 bedside I&D. Pt denies any pain. No complaints at this time.    Scheduled Meds:   enoxaparin  40 mg Subcutaneous Daily    insulin aspart  8 Units Subcutaneous TIDWM    insulin detemir  15 Units Subcutaneous Daily    insulin detemir  25 Units Subcutaneous QHS    miconazole   Topical (Top) BID    piperacillin-tazobactam 4.5 g in dextrose 5 % 100 mL IVPB (ready to mix system)  4.5 g Intravenous Q8H     Continuous Infusions:     PRN Meds:acetaminophen, dextrose 50%, dextrose 50%, glucagon (human recombinant), glucose, glucose, insulin aspart, ondansetron, oxycodone, oxycodone, promethazine (PHENERGAN) IVPB, ramelteon    Review of patient's allergies indicates:  No Known Allergies     Past Medical History   Diagnosis Date    Diabetes mellitus type II     Diabetic foot ulcer     Hyperlipidemia     Hypertension     Peripheral neuropathy     Ulcerative colitis, unspecified      Past Surgical History   Procedure Laterality Date    Toe amputation Right 06/05/2015      Family History   Problem Relation Age of Onset    Adopted: Yes    Hypertension Mother     Cancer Mother      breast    Diabetes Mother     Hypertension Father     Cataracts Father     Diabetes Sister     Hypertension Maternal Aunt      Social History   Substance Use Topics    Smoking status: Never Smoker    Smokeless tobacco: None    Alcohol use No       Review of Systems:  Constitutional: no fever or chills, pain well controlled  Respiratory: no cough or shortness of breath  Cardiovascular: no chest pain or palpitations  Gastrointestinal: no nausea or vomiting, tolerating diet  Musculoskeletal: no arthralgias or myalgias  Neurological: history of numbness or paresthesias in the feet    OBJECTIVE     Vital Signs (Most Recent):  Temp: 98.2 °F (36.8 °C) (01/14/17 1600)  Pulse: 88 (01/14/17 1600)  Resp: 18 (01/14/17 1600)  BP: 123/74 (01/14/17 1600)  SpO2: 97 % (01/13/17 0813)    Vital Signs Range (Last 24H):  Temp:  [98 °F (36.7 °C)-98.6 °F (37 °C)]   Pulse:  [82-88]   Resp:  [18-20]   BP: (115-127)/(64-83)     Physical Exam:  General: Oriented to person, place, time, and situation. No acute distress.  Vascular: DP and PT pulses are 2+ bilaterally. CRT<3 seconds to digits 1-5 bilaterally. Marked edema to the right LE   Musculoskeletal: Equinus noted b/l ankles with < 10 deg DF noted. Strength 5/5 in DF/PF/Inv/Ev resistance with no reproduction of pain in any direction.   Neuro: Protective sensation absent bilateral   Derm: No open lesions, lacerations or wounds noted to the left foot.     Right Foot    Ulcer Location: Plantar first MTPJ  Measurements: 2.2 x 2.0 x 0.3 cm, probes to the dorsal foot and to bone  Periwound: Intact, hyperkeratotic  Drainage: Malodorous and scant purulence   Malodor: Present  Base:  100% granular.   Signs of infection: Slight erythema, scant purulence, malodor, probes to bone.    Ulcer Location: Plantar PIPJ  Measurements:1.0x1.0x0.3,  probes deep to bone  Periwound:  Intact  Drainage: serosanguinous.  Pus: scant.  Malodor: Present.  Base:  100% granular.  Signs of infection: Malodor and erythema.    Ulcer Location: Plantar distal phalanx of hallux  Measurements:.5x.5x0.4 cm  Periwound: Intact  Drainage: purulent  Pus: present  Malodor: Present.  Base:  100% granular, probes to bone  Signs of infection: Malodor and erythema, purulence                             Laboratory:  CBC:     Recent Labs  Lab 01/13/17 0411   WBC 5.51   RBC 3.76*   HGB 10.7*   HCT 33.3*      MCV 89   MCH 28.5   MCHC 32.1     CMP:     Recent Labs  Lab 01/12/17 0228 01/13/17 0411   * 277*   CALCIUM 9.9 9.3   ALBUMIN 2.7*  --    PROT 7.9  --     137   K 4.4 3.7   CO2 26 28   CL 96 99   BUN 16 8   CREATININE 1.1 0.8   ALKPHOS 109  --    ALT 7*  --    AST 12  --    BILITOT 0.3  --      ESR:     Recent Labs  Lab 01/12/17 0228   SEDRATE 113*     CRP:     Recent Labs  Lab 01/12/17 0228   CRP 19.6*     Microbiology Results (last 7 days)     Procedure Component Value Units Date/Time    Aerobic culture (Specify Source) **CANNOT BE ORDERED AS STAT** [[id number]] Collected:  01/12/17 0433    Order Status:  Completed Specimen:  Bone from Foot, Right Updated:  01/14/17 1134     Aerobic Bacterial Culture --     STREPTOCOCCUS AGALACTIAE (GROUP B)  Few  Susceptibility testing not routinely performed  Skin janes also present      Blood culture [[id number]] Collected:  01/12/17 0342    Order Status:  Completed Specimen:  Blood from Peripheral, Forearm, Left Updated:  01/14/17 0812     Blood Culture, Routine No Growth to date     Blood Culture, Routine No Growth to date     Blood Culture, Routine No Growth to date    Blood culture [[id number]] Collected:  01/12/17 0230    Order Status:  Completed Specimen:  Blood from Peripheral, Right  Arm Updated:  01/14/17 0812     Blood Culture, Routine No Growth to date     Blood Culture, Routine No Growth to date     Blood Culture, Routine No Growth to date     Aerobic culture [[id number]] Collected:  01/12/17 1505    Order Status:  Completed Specimen:  Abscess from Foot, Right Updated:  01/14/17 0755     Aerobic Bacterial Culture No significant isolate to date    Gram stain [[id number]] Collected:  01/12/17 1505    Order Status:  Completed Specimen:  Abscess from Foot, Right Updated:  01/12/17 2216     Gram Stain Result Rare WBC's      Few Gram positive cocci    Culture, Anaerobe [[id number]] Collected:  01/12/17 1505    Order Status:  Sent Specimen:  Abscess from Foot, Right Updated:  01/12/17 2032    Blood culture [[id number]]     Order Status:  Completed Specimen:  Blood     Blood culture [[id number]]     Order Status:  Canceled Specimen:  Blood           Diagnostic Results:  X-Ray: reviewed  MRI: pending     Right foot x-ray:   Interval marked deformity and destruction about the first MTP joint involving the base of the first proximal phalanx and head of the first metatarsal highly concerning for septic arthritis/osteomyelitis. There is associated overlying cellulitis with focal plantar ulceration.    Interval partial amputation of the fifth metatarsal, noting slight irregularity with periosteal reaction involving the distal most portion of the remaining fifth metatarsal concerning for osteomyelitis.    MRI, right foot:   Status post metatarsal amputation of 5th ray.  There is advanced bone marrow replacement of the 1st ray involving the majority of the metatarsal as well as the phalanges.  There is associated cortical destruction, fragmentation of the 1st metatarsal head and extensive soft tissue edema.  There is a plantar ulcer in the region of the MTP joint with a 3 x 3 x 2 cm abscess replacing the joint.  Additional small ulcer noted at the distal aspect of the toe.  Remaining rays demonstrate preserved marrow signal, without evidence for fracture or osteomyelitis.  Lisfranc ligament is intact.  1.  Great toe osteomyelitis involving the phalanges and majority of  metatarsal.  Plantar ulcer with abscess at the MTP joint.  Additional small ulcer at the distal aspect of the toe.    ALISSA:The right ankle brachial index was 1.14 which is normal or elevated  The left ankle brachial index was 1.28 which is normal.   The waveforms of both lower extremities by Doppler and PVR's are diminished throughout suggesting arterial disease more significant than the resting ALISSA reflects. 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       ASSESSMENT/PLAN     Assessment:  -DM II with peripheral neuropathy  -Diabetic foot ulcer, right  -Diabetic foot infection with acute on chronic osteomyelitis of 1st ray.    Plan:    -The wounds were irrigated and packed open with mepilex AG. Nursing staff to change dressings daily, orders placed.  -Aerobic, anaerobic cultures and gram stain, in process  -ID on board, appreciate their recommendations   -Will likely require a first ray amputation with the amount of bone loss noted on x-ray secondary to infection. Discussed this with patient in detail. He is reluctant to undergo amputation at this point. He does agree to I&D and bone biopsy which we will schedule for Monday morning. Will also consider achilles lengthening to decrease plantar forefoot pressures and promote healing. Will revisit likely need for ray amputation.  -Spoke further about likelihood of possible bone removal in the OR, pt relates he would like to preserve as much of his digit as he can, will discuss further before surgery  -Appreciate the consult, podiatry will follow  -If questions, please contact the on-call podiatry resident      Maureen Gonzalez  DPM PGY-1  [phone]

## 2017-01-15 NOTE — PROGRESS NOTES
"[facility]  Progress Note    Patient Name: Indio Ryder Jr.  MRN: [id number]  Patient Class: IP- Inpatient   Admission Date: 1/12/2017  Length of Stay: 3 days  Attending Physician: Simon Kelly MD  Primary Care Provider: Lorena Thakur MD        Subjective:     Principal Problem:Subacute osteomyelitis of right foot    HPI:  Indio Ryder Jr. Is a 46 y.o.  man with hypertension and diabetes mellitus type 2 with peripheral neuropathy, s/p 5th toe ray amputation on 6/05/15. He lives in [place], [place]. He is single. He works in [employer] at [employer]. His primary care physician is Dr. Lorena Thakur. His podiatrist is Dr. William Manuel.    He lost his insurance and was unable to continue following up with Dr. Manuel in October 2016. He had taken ciprofloxacin and metronidazole for 6 months up until then. He was performing wound care himself at home "a couple of times per week". He noticed malodorous drainage, redness and swelling of his right foot prior to Marc, accompanied by fever, chills, and fatigue. He regained insurance beginning January 1, 2017.  Dr. Manuel saw him in his clinic on 1/10/17 and instructed him to go to an ED for IV antibiotics and to have "Dr. Matthew take a look at it". Dr. Matthew is a general surgeon at [facility]. He went to [facility], where there are also podiatrists, on 1/12/17.    He was afebrile, but pulse was 110s. WBC count was 9520 and hemoglobin and hematocrit were 11.8 and 37. ESR was 113, CRP was 19.6. He was hyperglycemic (glucose 421). X-ray of the right foot showed cellulitis and osteomyelitis of the right 1st and 5th metatarsals. He was given 1 liter normal saline, ceftriaxone, and vancomycin in the ED. He was admitted to [facility].       Hospital Course:  Vancomycin was continued and ceftriaxone changed to piperacillin-tazobactam. Podiatry was " consulted and performed bedside debridement and culture. He reported that he had been hyperglycemic recently due to the infection. Hemoglobin A1c was 15%. MRI showed osteomyelitis of most of the 1st metatarsal and the phalanges. Streptococcus agalactiae grew in bedside wound culture. Scheduled insulin doses were increased much higher than home doses to treat hyperglycemia.    Interval History: Wound irrigated and packed by Podiatry yesterday.    Review of Systems   Constitutional: Negative for chills and fever.   Cardiovascular: Negative for chest pain and palpitations.     Objective:     Vital Signs (Most Recent):  Temp: 98.3 °F (36.8 °C) (01/15/17 0435)  Pulse: 79 (01/15/17 0435)  Resp: 18 (01/15/17 0435)  BP: 112/72 (01/15/17 0435)  SpO2: 97 % (01/13/17 0813) Vital Signs (24h Range):  Temp:  [98 °F (36.7 °C)-98.5 °F (36.9 °C)] 98.3 °F (36.8 °C)  Pulse:  [79-95] 79  Resp:  [18-20] 18  BP: (112-123)/(64-76) 112/72     Weight: 93 kg (205 lb)  Body mass index is 27.05 kg/(m^2).    Intake/Output Summary (Last 24 hours) at 01/15/17 0831  Last data filed at 01/14/17 2045   Gross per 24 hour   Intake              250 ml   Output             1125 ml   Net             -875 ml      Physical Exam   Constitutional: He is oriented to person, place, and time. He appears well-developed and well-nourished.   Cardiovascular: Normal rate and regular rhythm.    Pulmonary/Chest: Effort normal. No respiratory distress.   Musculoskeletal: He exhibits edema.   Neurological: He is alert and oriented to person, place, and time.   Psychiatric: He has a normal mood and affect.   Nursing note and vitals reviewed.      Significant Labs: All pertinent labs within the past 24 hours have been reviewed.    Significant Imaging: None new      Assessment/Plan:      * Subacute osteomyelitis of right foot  Diabetic ulcer of right foot associated with type 2 diabetes mellitus  Continue piperacillin-tazobactam empirically. Streptococcus agalactiae grew  in bedside wound culture. Appreciate Infectious Disease and Podiatry. To OR tomorrow.      Type 2 diabetes mellitus with diabetic neuropathy, with long-term current use of insulin  Takes metformin 500 mg BID and insulin glargine 25 units HS at home. Hemoglobin A1c 15%. Giving insulin detemir 15 units am 25 units HS, insulin aspart 8 units TID with meals, moderate dose insulin sliding scale. Increase insulin aspart 8 to 10 units TID with meals.      VTE Risk Mitigation         Ordered     enoxaparin injection 40 mg  Daily     Route:  Subcutaneous        01/12/17 1239     Medium Risk of VTE  Once      01/12/17 1239          Simon Kelly MD  Department of Hospital Medicine   [facility]

## 2017-01-15 NOTE — PROGRESS NOTES
[facility]  Infectious Disease  Progress Note    Patient Name: Indio Ryder Jr.  MRN: [id number]  Admission Date: 1/12/2017  Length of Stay: 3 days  Attending Physician: Simon Kelly MD  Primary Care Provider: Lorena Thakur MD    Isolation Status: No active isolations  Assessment/Plan:      * Subacute osteomyelitis of right foot  45 y/o male with DM II and right foot ulcerations - history of longstanding ulcerations of this foot over 3 years - do not have old records. Patient had been on cipro and flagyl for about 6 months up until October 2016. He just restarted them PTA - had a few left at home.      Patient on vanco and zosyn empirically  So far group B strep from superficial cultures  Have discontinued vanco 1/14, continue zosyn for now     Need to see if patient has gotten tetanus booster - if not needs tdap       Group B streptococcal infection  Right foot positive for GBS - superficial cultures    Diabetic ulcer of right foot associated with type 2 diabetes mellitus  Management of DM per primary    Tinea pedis of left foot  Topical antifungal prescribed      Anticipated Disposition: Await surgery by Podiatry - await cultures at time of surgery - if residual osteo left after surgery - needs 6 weeks of IV therapy postop    Thank you for your consult. I will follow-up with patient. Please contact us if you have any additional questions.    Subjective:     Principal Problem:Subacute osteomyelitis of right foot    Interval History: Patient doing OK - surgery planned for tomorrow.     HPI:  45 y/o male with HTN, DM II, with peripheral neuropathy. Patient has had diabetic ulcer of right foot followed by podiatry as outpatient (Dr. William Manuel) up until October when patient lost insurance benefits. Patient was doing own wound care a few times a week. He noticed malodorous drainage and swelling and erythema in December prior to ann. Patient regained insurance Jan 1, 2017 and went to  podiatry on 1/10 and he was instructed to come to the ED. Patient presented to ED 1/12/17 - x ray of right foot concerning for osteo of 1st and 5th MT and cellulitis. Patient was given 1L NS and vanco and ceftriaxone in ED and admitted to hospital medicine. ED reports patient has history of 3 years of ongoing ulcer to right foot. MRI of foot ordered. Podiatry consulted. Patient placed on vanco and zosyn on admit.     Patient stated he had been on po cipro and flagyl for about 6 months until October. He had a few antibiotic pills left and he took them recently when he started to have drainage from right foot PTA. Patient does not recall last tetanus shot.     1/12 right foot culture - positive for group B strep  1/14 vancomycin discontinued, zosyn continued            Review of Systems   Respiratory: Negative for shortness of breath.    Gastrointestinal: Negative for diarrhea, nausea and vomiting.     Objective:     Antibiotics     Start     Stop Route Frequency Ordered    01/12/17 1300  piperacillin-tazobactam 4.5 g in dextrose 5 % 100 mL IVPB (ready to mix system)      -- IV Every 8 hours (non-standard times) 01/12/17 1239        Vital Signs (Most Recent):  Temp: 97.8 °F (36.6 °C) (01/15/17 1200)  Pulse: 80 (01/15/17 1200)  Resp: 18 (01/15/17 1200)  BP: 129/85 (01/15/17 1200)  SpO2: 97 % (01/13/17 0813) Vital Signs (24h Range):  Temp:  [97.8 °F (36.6 °C)-98.5 °F (36.9 °C)] 97.8 °F (36.6 °C)  Pulse:  [75-95] 80  Resp:  [18-20] 18  BP: (103-129)/(64-85) 129/85     Weight: 93 kg (205 lb)  Body mass index is 27.05 kg/(m^2).    Estimated Creatinine Clearance: 127.6 mL/min (based on Cr of 0.8).    Physical Exam   Cardiovascular: Normal heart sounds.    No murmur heard.  Pulmonary/Chest: Breath sounds normal. He is in respiratory distress.   Abdominal: Soft. Bowel sounds are normal. He exhibits no distension. There is no tenderness.   Musculoskeletal: He exhibits no edema.   Left foot with tinea pedis  Right foot bandaged        Significant Labs:      right foot culture - GPC - no significant isolate to date   right foot culture - group B strep and skin janes    Blood Culture:   Recent Labs  Lab 17  0230 17  0342   LABBLOO No Growth to date  No Growth to date  No Growth to date  No Growth to date No Growth to date  No Growth to date  No Growth to date  No Growth to date     Urine Studies:   Recent Labs  Lab 17  1341   COLORU Yellow   APPEARANCEUA Clear   PHUR 6.0   SPECGRAV 1.025   PROTEINUA Negative   GLUCUA 3+*   KETONESU 1+*   BILIRUBINUA Negative   OCCULTUA Negative   NITRITE Negative   UROBILINOGEN Negative   LEUKOCYTESUR Negative   BACTERIA None       Significant Imagin/12 .  Great toe osteomyelitis involving the phalanges and majority of metatarsal.  Plantar ulcer with abscess at the MTP joint.  Additional small ulcer at the distal aspect of the toe.          Rola Mckinney MD  Infectious Disease  [facility]

## 2017-01-15 NOTE — SUBJECTIVE & OBJECTIVE
Interval History: Patient doing OK - surgery planned for tomorrow.     HPI:  47 y/o male with HTN, DM II, with peripheral neuropathy. Patient has had diabetic ulcer of right foot followed by podiatry as outpatient (Dr. William Manuel) up until October when patient lost insurance benefits. Patient was doing own wound care a few times a week. He noticed malodorous drainage and swelling and erythema in December prior to ann. Patient regained insurance Jan 1, 2017 and went to podiatry on 1/10 and he was instructed to come to the ED. Patient presented to ED 1/12/17 - x ray of right foot concerning for osteo of 1st and 5th MT and cellulitis. Patient was given 1L NS and vanco and ceftriaxone in ED and admitted to hospital medicine. ED reports patient has history of 3 years of ongoing ulcer to right foot. MRI of foot ordered. Podiatry consulted. Patient placed on vanco and zosyn on admit.     Patient stated he had been on po cipro and flagyl for about 6 months until October. He had a few antibiotic pills left and he took them recently when he started to have drainage from right foot PTA. Patient does not recall last tetanus shot.     1/12 right foot culture - positive for group B strep  1/14 vancomycin discontinued, zosyn continued            Review of Systems   Respiratory: Negative for shortness of breath.    Gastrointestinal: Negative for diarrhea, nausea and vomiting.     Objective:     Antibiotics     Start     Stop Route Frequency Ordered    01/12/17 1300  piperacillin-tazobactam 4.5 g in dextrose 5 % 100 mL IVPB (ready to mix system)      -- IV Every 8 hours (non-standard times) 01/12/17 1239        Vital Signs (Most Recent):  Temp: 97.8 °F (36.6 °C) (01/15/17 1200)  Pulse: 80 (01/15/17 1200)  Resp: 18 (01/15/17 1200)  BP: 129/85 (01/15/17 1200)  SpO2: 97 % (01/13/17 0813) Vital Signs (24h Range):  Temp:  [97.8 °F (36.6 °C)-98.5 °F (36.9 °C)] 97.8 °F (36.6 °C)  Pulse:  [75-95] 80  Resp:  [18-20] 18  BP:  (103-129)/(64-85) 129/85     Weight: 93 kg (205 lb)  Body mass index is 27.05 kg/(m^2).    Estimated Creatinine Clearance: 127.6 mL/min (based on Cr of 0.8).    Physical Exam   Cardiovascular: Normal heart sounds.    No murmur heard.  Pulmonary/Chest: Breath sounds normal. He is in respiratory distress.   Abdominal: Soft. Bowel sounds are normal. He exhibits no distension. There is no tenderness.   Musculoskeletal: He exhibits no edema.   Left foot with tinea pedis  Right foot bandaged       Significant Labs:      right foot culture - GPC - no significant isolate to date   right foot culture - group B strep and skin janes    Blood Culture:   Recent Labs  Lab 17  0230 17  0342   LABBLOO No Growth to date  No Growth to date  No Growth to date  No Growth to date No Growth to date  No Growth to date  No Growth to date  No Growth to date     Urine Studies:   Recent Labs  Lab 17  1341   COLORU Yellow   APPEARANCEUA Clear   PHUR 6.0   SPECGRAV 1.025   PROTEINUA Negative   GLUCUA 3+*   KETONESU 1+*   BILIRUBINUA Negative   OCCULTUA Negative   NITRITE Negative   UROBILINOGEN Negative   LEUKOCYTESUR Negative   BACTERIA None       Significant Imagin/12 .  Great toe osteomyelitis involving the phalanges and majority of metatarsal.  Plantar ulcer with abscess at the MTP joint.  Additional small ulcer at the distal aspect of the toe.

## 2017-01-16 ENCOUNTER — ANESTHESIA (OUTPATIENT)
Dept: SURGERY | Facility: HOSPITAL | Age: 47
DRG: 617 | End: 2017-01-16
Payer: MEDICAID

## 2017-01-16 ENCOUNTER — SURGERY (OUTPATIENT)
Age: 47
End: 2017-01-16

## 2017-01-16 LAB
ANION GAP SERPL CALC-SCNC: 9 MMOL/L
ANION GAP SERPL CALC-SCNC: 9 MMOL/L
APTT BLDCRRT: 30.2 SEC
BACTERIA SPEC AEROBE CULT: NORMAL
BASOPHILS # BLD AUTO: 0.01 K/UL
BASOPHILS # BLD AUTO: 0.01 K/UL
BASOPHILS NFR BLD: 0.2 %
BASOPHILS NFR BLD: 0.2 %
BUN SERPL-MCNC: 8 MG/DL
BUN SERPL-MCNC: 8 MG/DL
CALCIUM SERPL-MCNC: 8.9 MG/DL
CALCIUM SERPL-MCNC: 8.9 MG/DL
CHLORIDE SERPL-SCNC: 102 MMOL/L
CHLORIDE SERPL-SCNC: 102 MMOL/L
CO2 SERPL-SCNC: 28 MMOL/L
CO2 SERPL-SCNC: 28 MMOL/L
CREAT SERPL-MCNC: 1.1 MG/DL
CREAT SERPL-MCNC: 1.1 MG/DL
CRP SERPL-MCNC: 5.1 MG/L
DIFFERENTIAL METHOD: ABNORMAL
DIFFERENTIAL METHOD: ABNORMAL
EOSINOPHIL # BLD AUTO: 0.1 K/UL
EOSINOPHIL # BLD AUTO: 0.1 K/UL
EOSINOPHIL NFR BLD: 1 %
EOSINOPHIL NFR BLD: 1 %
ERYTHROCYTE [DISTWIDTH] IN BLOOD BY AUTOMATED COUNT: 13.2 %
ERYTHROCYTE [DISTWIDTH] IN BLOOD BY AUTOMATED COUNT: 13.2 %
ERYTHROCYTE [SEDIMENTATION RATE] IN BLOOD BY WESTERGREN METHOD: 113 MM/HR
EST. GFR  (AFRICAN AMERICAN): >60 ML/MIN/1.73 M^2
EST. GFR  (AFRICAN AMERICAN): >60 ML/MIN/1.73 M^2
EST. GFR  (NON AFRICAN AMERICAN): >60 ML/MIN/1.73 M^2
EST. GFR  (NON AFRICAN AMERICAN): >60 ML/MIN/1.73 M^2
GLUCOSE SERPL-MCNC: 198 MG/DL
GRAM STN SPEC: NORMAL
HCT VFR BLD AUTO: 34.4 %
HCT VFR BLD AUTO: 34.4 %
HGB BLD-MCNC: 10.8 G/DL
HGB BLD-MCNC: 10.8 G/DL
INR PPP: 1.1
LYMPHOCYTES # BLD AUTO: 2.1 K/UL
LYMPHOCYTES # BLD AUTO: 2.1 K/UL
LYMPHOCYTES NFR BLD: 35.9 %
LYMPHOCYTES NFR BLD: 35.9 %
MCH RBC QN AUTO: 28.1 PG
MCH RBC QN AUTO: 28.1 PG
MCHC RBC AUTO-ENTMCNC: 31.4 %
MCHC RBC AUTO-ENTMCNC: 31.4 %
MCV RBC AUTO: 90 FL
MCV RBC AUTO: 90 FL
MONOCYTES # BLD AUTO: 0.4 K/UL
MONOCYTES # BLD AUTO: 0.4 K/UL
MONOCYTES NFR BLD: 6.9 %
MONOCYTES NFR BLD: 6.9 %
NEUTROPHILS # BLD AUTO: 3.3 K/UL
NEUTROPHILS # BLD AUTO: 3.3 K/UL
NEUTROPHILS NFR BLD: 55.8 %
NEUTROPHILS NFR BLD: 55.8 %
PLATELET # BLD AUTO: 279 K/UL
PLATELET # BLD AUTO: 279 K/UL
PMV BLD AUTO: 9.8 FL
PMV BLD AUTO: 9.8 FL
POCT GLUCOSE: 119 MG/DL (ref 70–110)
POCT GLUCOSE: 122 MG/DL (ref 70–110)
POCT GLUCOSE: 162 MG/DL (ref 70–110)
POCT GLUCOSE: 163 MG/DL (ref 70–110)
POTASSIUM SERPL-SCNC: 3.6 MMOL/L
POTASSIUM SERPL-SCNC: 3.6 MMOL/L
PROTHROMBIN TIME: 11.4 SEC
RBC # BLD AUTO: 3.84 M/UL
RBC # BLD AUTO: 3.84 M/UL
SODIUM SERPL-SCNC: 139 MMOL/L
SODIUM SERPL-SCNC: 139 MMOL/L
WBC # BLD AUTO: 5.91 K/UL
WBC # BLD AUTO: 5.91 K/UL

## 2017-01-16 PROCEDURE — 25000003 PHARM REV CODE 250: Performed by: PODIATRIST

## 2017-01-16 PROCEDURE — 63600175 PHARM REV CODE 636 W HCPCS: Performed by: PODIATRIST

## 2017-01-16 PROCEDURE — 25000003 PHARM REV CODE 250: Performed by: NURSE PRACTITIONER

## 2017-01-16 PROCEDURE — 27201423 OPTIME MED/SURG SUP & DEVICES STERILE SUPPLY: Performed by: PODIATRIST

## 2017-01-16 PROCEDURE — 36000705 HC OR TIME LEV I EA ADD 15 MIN: Performed by: PODIATRIST

## 2017-01-16 PROCEDURE — 80048 BASIC METABOLIC PNL TOTAL CA: CPT

## 2017-01-16 PROCEDURE — 99232 SBSQ HOSP IP/OBS MODERATE 35: CPT | Mod: 57,,, | Performed by: PODIATRIST

## 2017-01-16 PROCEDURE — 36000706: Performed by: PODIATRIST

## 2017-01-16 PROCEDURE — 88305 TISSUE EXAM BY PATHOLOGIST: CPT | Mod: 26,,, | Performed by: PATHOLOGY

## 2017-01-16 PROCEDURE — 63600175 PHARM REV CODE 636 W HCPCS: Performed by: NURSE PRACTITIONER

## 2017-01-16 PROCEDURE — 85025 COMPLETE CBC W/AUTO DIFF WBC: CPT

## 2017-01-16 PROCEDURE — 36000707: Performed by: PODIATRIST

## 2017-01-16 PROCEDURE — 37000009 HC ANESTHESIA EA ADD 15 MINS: Performed by: PODIATRIST

## 2017-01-16 PROCEDURE — 28005 TREAT FOOT BONE LESION: CPT | Mod: RT,,, | Performed by: PODIATRIST

## 2017-01-16 PROCEDURE — 36000704 HC OR TIME LEV I 1ST 15 MIN: Performed by: PODIATRIST

## 2017-01-16 PROCEDURE — 85610 PROTHROMBIN TIME: CPT

## 2017-01-16 PROCEDURE — 71000033 HC RECOVERY, INTIAL HOUR: Performed by: PODIATRIST

## 2017-01-16 PROCEDURE — 71000039 HC RECOVERY, EACH ADD'L HOUR: Performed by: PODIATRIST

## 2017-01-16 PROCEDURE — 86140 C-REACTIVE PROTEIN: CPT

## 2017-01-16 PROCEDURE — 85730 THROMBOPLASTIN TIME PARTIAL: CPT

## 2017-01-16 PROCEDURE — 87102 FUNGUS ISOLATION CULTURE: CPT

## 2017-01-16 PROCEDURE — 87075 CULTR BACTERIA EXCEPT BLOOD: CPT

## 2017-01-16 PROCEDURE — 87070 CULTURE OTHR SPECIMN AEROBIC: CPT

## 2017-01-16 PROCEDURE — 25000003 PHARM REV CODE 250: Performed by: NURSE ANESTHETIST, CERTIFIED REGISTERED

## 2017-01-16 PROCEDURE — 63600175 PHARM REV CODE 636 W HCPCS: Performed by: NURSE ANESTHETIST, CERTIFIED REGISTERED

## 2017-01-16 PROCEDURE — 36415 COLL VENOUS BLD VENIPUNCTURE: CPT

## 2017-01-16 PROCEDURE — 88311 DECALCIFY TISSUE: CPT | Mod: 26,,, | Performed by: PATHOLOGY

## 2017-01-16 PROCEDURE — 87176 TISSUE HOMOGENIZATION CULTR: CPT

## 2017-01-16 PROCEDURE — 87015 SPECIMEN INFECT AGNT CONCNTJ: CPT

## 2017-01-16 PROCEDURE — 37000008 HC ANESTHESIA 1ST 15 MINUTES: Performed by: PODIATRIST

## 2017-01-16 PROCEDURE — 88305 TISSUE EXAM BY PATHOLOGIST: CPT | Performed by: PATHOLOGY

## 2017-01-16 PROCEDURE — 11000001 HC ACUTE MED/SURG PRIVATE ROOM

## 2017-01-16 PROCEDURE — 87116 MYCOBACTERIA CULTURE: CPT

## 2017-01-16 PROCEDURE — 85652 RBC SED RATE AUTOMATED: CPT

## 2017-01-16 PROCEDURE — 87205 SMEAR GRAM STAIN: CPT

## 2017-01-16 RX ORDER — ONDANSETRON 2 MG/ML
INJECTION INTRAMUSCULAR; INTRAVENOUS
Status: DISCONTINUED | OUTPATIENT
Start: 2017-01-16 | End: 2017-01-16

## 2017-01-16 RX ORDER — SODIUM CHLORIDE 0.9 % (FLUSH) 0.9 %
3 SYRINGE (ML) INJECTION EVERY 8 HOURS
Status: DISCONTINUED | OUTPATIENT
Start: 2017-01-16 | End: 2017-01-16

## 2017-01-16 RX ORDER — FENTANYL CITRATE 50 UG/ML
INJECTION, SOLUTION INTRAMUSCULAR; INTRAVENOUS
Status: DISCONTINUED | OUTPATIENT
Start: 2017-01-16 | End: 2017-01-16

## 2017-01-16 RX ORDER — MIDAZOLAM HYDROCHLORIDE 1 MG/ML
INJECTION, SOLUTION INTRAMUSCULAR; INTRAVENOUS
Status: DISCONTINUED | OUTPATIENT
Start: 2017-01-16 | End: 2017-01-16

## 2017-01-16 RX ORDER — SODIUM CHLORIDE 0.9 % (FLUSH) 0.9 %
3 SYRINGE (ML) INJECTION
Status: DISCONTINUED | OUTPATIENT
Start: 2017-01-16 | End: 2017-01-16

## 2017-01-16 RX ORDER — LIDOCAINE HCL/PF 100 MG/5ML
SYRINGE (ML) INTRAVENOUS
Status: DISCONTINUED | OUTPATIENT
Start: 2017-01-16 | End: 2017-01-16

## 2017-01-16 RX ORDER — PHENYLEPHRINE HYDROCHLORIDE 10 MG/ML
INJECTION INTRAVENOUS
Status: DISCONTINUED | OUTPATIENT
Start: 2017-01-16 | End: 2017-01-16

## 2017-01-16 RX ORDER — PROPOFOL 10 MG/ML
VIAL (ML) INTRAVENOUS
Status: DISCONTINUED | OUTPATIENT
Start: 2017-01-16 | End: 2017-01-16

## 2017-01-16 RX ORDER — SODIUM CHLORIDE 9 MG/ML
INJECTION, SOLUTION INTRAVENOUS CONTINUOUS
Status: DISCONTINUED | OUTPATIENT
Start: 2017-01-16 | End: 2017-01-16

## 2017-01-16 RX ORDER — SODIUM CHLORIDE, SODIUM LACTATE, POTASSIUM CHLORIDE, CALCIUM CHLORIDE 600; 310; 30; 20 MG/100ML; MG/100ML; MG/100ML; MG/100ML
INJECTION, SOLUTION INTRAVENOUS CONTINUOUS PRN
Status: DISCONTINUED | OUTPATIENT
Start: 2017-01-16 | End: 2017-01-16

## 2017-01-16 RX ORDER — HYDROMORPHONE HYDROCHLORIDE 2 MG/ML
0.5 INJECTION, SOLUTION INTRAMUSCULAR; INTRAVENOUS; SUBCUTANEOUS EVERY 5 MIN PRN
Status: DISCONTINUED | OUTPATIENT
Start: 2017-01-16 | End: 2017-01-16

## 2017-01-16 RX ORDER — SUCCINYLCHOLINE CHLORIDE 20 MG/ML
INJECTION INTRAMUSCULAR; INTRAVENOUS
Status: DISCONTINUED | OUTPATIENT
Start: 2017-01-16 | End: 2017-01-16

## 2017-01-16 RX ORDER — BUPIVACAINE HYDROCHLORIDE 5 MG/ML
INJECTION, SOLUTION EPIDURAL; INTRACAUDAL
Status: DISCONTINUED | OUTPATIENT
Start: 2017-01-16 | End: 2017-01-16 | Stop reason: HOSPADM

## 2017-01-16 RX ORDER — ONDANSETRON 2 MG/ML
4 INJECTION INTRAMUSCULAR; INTRAVENOUS ONCE AS NEEDED
Status: DISCONTINUED | OUTPATIENT
Start: 2017-01-16 | End: 2017-01-16

## 2017-01-16 RX ORDER — VANCOMYCIN HYDROCHLORIDE 1 G/20ML
INJECTION, POWDER, LYOPHILIZED, FOR SOLUTION INTRAVENOUS
Status: DISCONTINUED | OUTPATIENT
Start: 2017-01-16 | End: 2017-01-16 | Stop reason: HOSPADM

## 2017-01-16 RX ADMIN — THROMBIN, TOPICAL (BOVINE) 5000 UNITS: KIT at 07:01

## 2017-01-16 RX ADMIN — MICONAZOLE NITRATE: 20 CREAM TOPICAL at 09:01

## 2017-01-16 RX ADMIN — PROPOFOL 150 MG: 10 INJECTION, EMULSION INTRAVENOUS at 07:01

## 2017-01-16 RX ADMIN — GELATIN ABSORBABLE SPONGE SIZE 100 1 APPLICATOR: MISC at 07:01

## 2017-01-16 RX ADMIN — MICONAZOLE NITRATE: 20 CREAM TOPICAL at 10:01

## 2017-01-16 RX ADMIN — ONDANSETRON 4 MG: 2 INJECTION, SOLUTION INTRAMUSCULAR; INTRAVENOUS at 07:01

## 2017-01-16 RX ADMIN — FENTANYL CITRATE 100 MCG: 50 INJECTION, SOLUTION INTRAMUSCULAR; INTRAVENOUS at 07:01

## 2017-01-16 RX ADMIN — INSULIN ASPART 8 UNITS: 100 INJECTION, SOLUTION INTRAVENOUS; SUBCUTANEOUS at 04:01

## 2017-01-16 RX ADMIN — PIPERACILLIN SODIUM AND TAZOBACTAM SODIUM 4.5 G: 4; .5 INJECTION, POWDER, LYOPHILIZED, FOR SOLUTION INTRAVENOUS at 09:01

## 2017-01-16 RX ADMIN — PIPERACILLIN SODIUM AND TAZOBACTAM SODIUM 4.5 G: 4; .5 INJECTION, POWDER, LYOPHILIZED, FOR SOLUTION INTRAVENOUS at 05:01

## 2017-01-16 RX ADMIN — PHENYLEPHRINE HYDROCHLORIDE 100 MCG: 10 INJECTION INTRAVENOUS at 07:01

## 2017-01-16 RX ADMIN — SODIUM CHLORIDE, SODIUM LACTATE, POTASSIUM CHLORIDE, AND CALCIUM CHLORIDE: .6; .31; .03; .02 INJECTION, SOLUTION INTRAVENOUS at 06:01

## 2017-01-16 RX ADMIN — LIDOCAINE HYDROCHLORIDE 80 MG: 20 INJECTION, SOLUTION INTRAVENOUS at 07:01

## 2017-01-16 RX ADMIN — INSULIN ASPART 8 UNITS: 100 INJECTION, SOLUTION INTRAVENOUS; SUBCUTANEOUS at 12:01

## 2017-01-16 RX ADMIN — VANCOMYCIN HYDROCHLORIDE 1 G: 1 INJECTION, POWDER, LYOPHILIZED, FOR SOLUTION INTRAVENOUS at 07:01

## 2017-01-16 RX ADMIN — PIPERACILLIN SODIUM AND TAZOBACTAM SODIUM 4.5 G: 4; .5 INJECTION, POWDER, LYOPHILIZED, FOR SOLUTION INTRAVENOUS at 12:01

## 2017-01-16 RX ADMIN — PIPERACILLIN SODIUM AND TAZOBACTAM SODIUM 4.5 G: 4; .5 INJECTION, POWDER, LYOPHILIZED, FOR SOLUTION INTRAVENOUS at 07:01

## 2017-01-16 RX ADMIN — ACETAMINOPHEN 650 MG: 325 TABLET ORAL at 07:01

## 2017-01-16 RX ADMIN — BUPIVACAINE HYDROCHLORIDE 30 ML: 5 INJECTION, SOLUTION EPIDURAL; INTRACAUDAL; PERINEURAL at 07:01

## 2017-01-16 RX ADMIN — ENOXAPARIN SODIUM 40 MG: 100 INJECTION SUBCUTANEOUS at 12:01

## 2017-01-16 RX ADMIN — SUCCINYLCHOLINE CHLORIDE 100 MG: 20 INJECTION, SOLUTION INTRAMUSCULAR; INTRAVENOUS at 07:01

## 2017-01-16 RX ADMIN — MIDAZOLAM 2 MG: 1 INJECTION INTRAMUSCULAR; INTRAVENOUS at 06:01

## 2017-01-16 RX ADMIN — INSULIN DETEMIR 15 UNITS: 100 INJECTION, SOLUTION SUBCUTANEOUS at 10:01

## 2017-01-16 NOTE — BRIEF OP NOTE
[facility]  Brief Operative Note    SUMMARY     Surgery Date: 1/16/2017     Surgeon(s) and Role:     * Ramirez Wilkes DPM - Primary     * Bill Muhammad DPM - Resident - Assisting    Pre-op Diagnosis:  Infection [B99.9]  Diabetic ulcer of right foot associated with type 2 diabetes mellitus [E11.621, L97.519]  Subacute osteomyelitis of right foot [M86.271]    Post-op Diagnosis:  Post-Op Diagnosis Codes:     * Infection [B99.9]     * Diabetic ulcer of right foot associated with type 2 diabetes mellitus [E11.621, L97.519]     * Subacute osteomyelitis of right foot [M86.271]    Procedure(s) (LRB):  INCISION AND DRAINAGE (I&D), FOOT (Right)    Anesthesia: General    Description of Procedure: Right foot I&D with resection of the first metatarsal head and base of the proximal phalanx. Also I&D of the hallux at the IPJ level. Partial closure with retention sutures and packed with iodoform packing.   This is part one of a staged procedure, he will likely require further debridement likely amputation of the right hallux later this week.      Description of the findings of the procedure: First metatarsal head and base of the phalanx were brittle/soft and grey dull in appearance with cortical breaks consistent with osteomyelitis.    Estimated Blood Loss: 20 mL         Specimens:   Specimen (12h ago through future)    Start     Ordered    01/16/17 0741  Specimen to Pathology - Surgery  Once     Comments:  1. 1st ray right foot  2. Right 1st metatarsal - clean margin    01/16/17 4464        Bill Muhammad DPM PGY-3  Pager [phone]

## 2017-01-16 NOTE — PROGRESS NOTES
"[facility]  Progress Note    Patient Name: Indio Ryder Jr.  MRN: [id number]  Patient Class: IP- Inpatient   Admission Date: 1/12/2017  Length of Stay: 4 days  Attending Physician: Simon Kelly MD  Primary Care Provider: Lorena Thakur MD        Subjective:     Principal Problem:Subacute osteomyelitis of right foot    HPI:  Indio Ryder Jr. Is a 46 y.o.  man with hypertension and diabetes mellitus type 2 with peripheral neuropathy, s/p 5th toe ray amputation on 6/05/15. He lives in [place], [place]. He is single. He works in [place] at [employer]. His primary care physician is Dr. Lorena Thakur. His podiatrist is Dr. William Manuel.    He lost his insurance and was unable to continue following up with Dr. Manuel in October 2016. He had taken ciprofloxacin and metronidazole for 6 months up until then. He was performing wound care himself at home "a couple of times per week". He noticed malodorous drainage, redness and swelling of his right foot prior to Marc, accompanied by fever, chills, and fatigue. He regained insurance beginning January 1, 2017.  Dr. Manuel saw him in his clinic on 1/10/17 and instructed him to go to an ED for IV antibiotics and to have "Dr. Matthew take a look at it". Dr. Matthew is a general surgeon at [facility]. He went to [facility], where there are also podiatrists, on 1/12/17.    He was afebrile, but pulse was 110s. WBC count was 9520 and hemoglobin and hematocrit were 11.8 and 37. ESR was 113, CRP was 19.6. He was hyperglycemic (glucose 421). X-ray of the right foot showed cellulitis and osteomyelitis of the right 1st and 5th metatarsals. He was given 1 liter normal saline, ceftriaxone, and vancomycin in the ED. He was admitted to [facility].       Hospital Course:  Vancomycin was continued and ceftriaxone changed to piperacillin-tazobactam. Podiatry was " consulted and performed bedside debridement and culture. He reported that he had been hyperglycemic recently due to the infection. Hemoglobin A1c was 15%. MRI showed osteomyelitis of most of the 1st metatarsal and the phalanges. Streptococcus agalactiae grew in bedside wound culture. Scheduled insulin doses were increased much higher than home doses to treat hyperglycemia.    Interval History: NAEON.  Patient laying in bed comfortably this morning.  Denies shortness of breath, chest pain, foot pain.  Denies having any questions/concerns about I&D and bone biopsy scheduled for later today.    Review of Systems   Constitutional: Negative for chills and fever.   Respiratory: Negative for cough and shortness of breath.    Cardiovascular: Negative for chest pain and palpitations.   Gastrointestinal: Negative for abdominal pain, nausea and vomiting.   Musculoskeletal: Negative for arthralgias and myalgias.     Objective:     Vital Signs (Most Recent):  Temp: 97.9 °F (36.6 °C) (01/16/17 0400)  Pulse: 81 (01/16/17 0400)  Resp: 18 (01/16/17 0400)  BP: 121/79 (01/16/17 0400)  SpO2: 99 % (01/15/17 2000) Vital Signs (24h Range):  Temp:  [97.8 °F (36.6 °C)-98.3 °F (36.8 °C)] 97.9 °F (36.6 °C)  Pulse:  [75-94] 81  Resp:  [18-19] 18  SpO2:  [99 %] 99 %  BP: (103-152)/(64-93) 121/79     Weight: 93 kg (205 lb)  Body mass index is 27.05 kg/(m^2).    Intake/Output Summary (Last 24 hours) at 01/16/17 0601  Last data filed at 01/16/17 0400   Gross per 24 hour   Intake              200 ml   Output             1400 ml   Net            -1200 ml      Physical Exam   Constitutional: He is oriented to person, place, and time. He appears well-developed and well-nourished.   HENT:   Head: Normocephalic and atraumatic.   Mouth/Throat: Oropharynx is clear and moist.   Eyes: EOM are normal. Pupils are equal, round, and reactive to light.   Neck: Normal range of motion. Neck supple.   Cardiovascular: Normal rate and regular rhythm.     Pulmonary/Chest: Effort normal. No respiratory distress.   Abdominal: Soft. Bowel sounds are normal. He exhibits no distension. There is no tenderness.   Musculoskeletal: He exhibits edema. He exhibits no tenderness.   Neurological: He is alert and oriented to person, place, and time.   Psychiatric: He has a normal mood and affect. His behavior is normal.   Nursing note and vitals reviewed.      Significant Labs:   CBC:   Recent Labs  Lab 01/16/17 0359   WBC 5.91  5.91   HGB 10.8*  10.8*   HCT 34.4*  34.4*     279     CMP:   Recent Labs  Lab 01/16/17 0359     139   K 3.6  3.6     102   CO2 28  28   *  198*  198*   BUN 8  8   CREATININE 1.1  1.1   CALCIUM 8.9  8.9   ANIONGAP 9  9   EGFRNONAA >60  >60     Coagulation:   Recent Labs  Lab 01/16/17 0359   INR 1.1   APTT 30.2     CRP 5.1      All pertinent labs within the past 24 hours have been reviewed.    Significant Imaging: I have reviewed all pertinent imaging results/findings within the past 24 hours.    Assessment/Plan:      * Subacute osteomyelitis of right foot  Diabetic ulcer of right foot associated with type 2 diabetes mellitus    Continue piperacillin-tazobactam empirically. Streptococcus agalactiae grew in bedside wound culture. Appreciate Infectious Disease and Podiatry. To OR today for I&D and bone biopsy..      Essential hypertension  SBP range 103 to 152.  Not requiring medications at this time.      Hyperlipidemia  Not currently on medications.      Diabetic ulcer of right foot associated with type 2 diabetes mellitus  Podiatry to debride.      Type 2 diabetes mellitus with diabetic neuropathy, with long-term current use of insulin  Takes metformin 500 mg BID and insulin glargine 25 units HS at home. Hemoglobin A1c 15%. Giving insulin detemir 15 units am 25 units HS, insulin aspart 10 units TID with meals, moderate dose insulin sliding scale.       Tinea pedis of left foot  Started on miconazole  cream.      VTE Risk Mitigation         Ordered     enoxaparin injection 40 mg  Daily     Route:  Subcutaneous        01/12/17 1239     Medium Risk of VTE  Once      01/12/17 1239          John Kim NP  Department of Hospital Medicine   [facility]

## 2017-01-16 NOTE — PLAN OF CARE
Problem: Patient Care Overview  Goal: Plan of Care Review  Outcome: Ongoing (interventions implemented as appropriate)  Reviewed plan of care with pt. Pt NPO since midnight. Pt scheduled for I & D of the R foot in am. Pt denies any pain. Will continue antibiotic therapy. Safety measures are in place, bed low and in lock position, call light in reach and bed alarm is on.

## 2017-01-16 NOTE — PLAN OF CARE
Signed out from pacu per Dr Frankel. Report called to Paty Valentine/4B. Transported to room 474 via bed,sr upx4.

## 2017-01-16 NOTE — PLAN OF CARE
Pt returned to floor via bed. Responds to voice; sleepy. AAOx3. VSS per flowsheet. Denies pain. Dressing to right foot CDI. Left foot cleaned and ointment applied per orders. Pt tolerated well. Will continue to monitor.

## 2017-01-16 NOTE — TRANSFER OF CARE
"Anesthesia Transfer of Care Note    Patient: Indio Ryder Jr.    Procedure(s) Performed: Procedure(s) (LRB):  INCISION AND DRAINAGE (I&D), FOOT (Right)    Patient location: PACU    Anesthesia Type: general    Transport from OR: Transported from OR on 6-10 L/min O2 by face mask with adequate spontaneous ventilation    Post pain: adequate analgesia    Post assessment: no apparent anesthetic complications and tolerated procedure well    Post vital signs: stable    Level of consciousness: awake, alert and oriented    Nausea/Vomiting: no nausea/vomiting    Complications: none          Last vitals:   Visit Vitals    /79 (BP Location: Right arm, Patient Position: Lying, BP Method: Automatic)    Pulse 81    Temp 36.6 °C (97.9 °F) (Oral)    Resp 18    Ht 6' 1" (1.854 m)    Wt 93 kg (205 lb)    SpO2 99%    BMI 27.05 kg/m2     "

## 2017-01-16 NOTE — ASSESSMENT & PLAN NOTE
Takes metformin 500 mg BID and insulin glargine 25 units HS at home. Hemoglobin A1c 15%. Giving insulin detemir 15 units am 25 units HS, insulin aspart 10 units TID with meals, moderate dose insulin sliding scale.
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
## 2017-01-16 NOTE — ASSESSMENT & PLAN NOTE
Diabetic ulcer of right foot associated with type 2 diabetes mellitus    Continue piperacillin-tazobactam empirically. Streptococcus agalactiae grew in bedside wound culture. Appreciate Infectious Disease and Podiatry. To OR today for I&D and bone biopsy..

## 2017-01-16 NOTE — ANESTHESIA POSTPROCEDURE EVALUATION
"Anesthesia Post Evaluation    Patient: Indio Ryder Jr.    Procedure(s) Performed: Procedure(s) (LRB):  INCISION AND DRAINAGE (I&D), FOOT (Right)    Final Anesthesia Type: general  Patient location during evaluation: PACU  Patient participation: Yes- Able to Participate  Level of consciousness: awake and alert  Post-procedure vital signs: reviewed and stable  Pain management: adequate  Airway patency: patent  PONV status at discharge: No PONV  Anesthetic complications: no      Cardiovascular status: hemodynamically stable  Respiratory status: unassisted  Hydration status: euvolemic  Follow-up not needed.        Visit Vitals    /72    Pulse 78    Temp 36.4 °C (97.6 °F) (Oral)    Resp 18    Ht 6' 1" (1.854 m)    Wt 93 kg (205 lb)    SpO2 98%    BMI 27.05 kg/m2       Pain/Jenny Score: Pain Assessment Performed: Yes (1/16/2017  1:07 PM)  Presence of Pain: denies (1/16/2017  1:07 PM)  Jenny Score: 9 (1/16/2017  9:24 AM)      "

## 2017-01-16 NOTE — PROGRESS NOTES
Patient went to OR for bone biopsy and I&D of foot.  Patient shows no insurance on facesheet. Left message for medicaid office to see if patient eligible for medicaid.  Received call back from Sarah in medicaid office and she stated that she spoke to patient last week and patient said he does have insurance. Called admitting and they said patient didn't have his insurance card, his old [employer] not current anymore. Admitting will check with patient again regarding insurance.    Geetha Moraes, RN, CCM, CMSRN  RN Transition Navigator  [phone]

## 2017-01-16 NOTE — PROGRESS NOTES
Progress Note  Podiatry    Consult Requested By: Simon Kelly MD  Reason for Consult: right foot osteomyelitis    SUBJECTIVE     History of Present Illness:  Indio Ryder Jr. is a 46 y.o. male who  has a past medical history of Diabetes mellitus type II; Diabetic foot ulcer; Hyperlipidemia; Hypertension; Peripheral neuropathy; and Ulcerative colitis, unspecified. He has had an ulcer sub first MTPJ right foot off and on for several years. He is a patient of Dr. Manuel's outpatient who sent him in to the ED for osteomyelitis. Patient relates that he did not see Dr. Manuel for a long period prior to this most recent visit due to insurance reasons. He has been trying to treat this ulcer himself bandaging it daily. Denies f/c/n/v. History of partial 5th ray amputation about 2 yrs ago with no complication.    1/16/17: Patient seen bedside pre operatively. Doing well this morning. Denies pain to the affected limb.    Scheduled Meds:   enoxaparin  40 mg Subcutaneous Daily    insulin aspart  8 Units Subcutaneous TIDWM    insulin detemir  15 Units Subcutaneous Daily    insulin detemir  25 Units Subcutaneous QHS    miconazole   Topical (Top) BID    piperacillin-tazobactam 4.5 g in dextrose 5 % 100 mL IVPB (ready to mix system)  4.5 g Intravenous Q8H     Continuous Infusions:     PRN Meds:acetaminophen, dextrose 50%, dextrose 50%, glucagon (human recombinant), glucose, glucose, insulin aspart, ondansetron, oxycodone, oxycodone, promethazine (PHENERGAN) IVPB, ramelteon    Review of patient's allergies indicates:  No Known Allergies     Past Medical History   Diagnosis Date    Diabetes mellitus type II     Diabetic foot ulcer     Hyperlipidemia     Hypertension     Peripheral neuropathy     Ulcerative colitis, unspecified      Past Surgical History   Procedure Laterality Date    Toe amputation Right 06/05/2015     Family History   Problem Relation Age of Onset    Adopted: Yes    Hypertension Mother      Cancer Mother      breast    Diabetes Mother     Hypertension Father     Cataracts Father     Diabetes Sister     Hypertension Maternal Aunt      Social History   Substance Use Topics    Smoking status: Never Smoker    Smokeless tobacco: None    Alcohol use No       Review of Systems:  Constitutional: no fever or chills, pain well controlled  Respiratory: no cough or shortness of breath  Cardiovascular: no chest pain or palpitations  Gastrointestinal: no nausea or vomiting, tolerating diet  Musculoskeletal: no arthralgias or myalgias  Neurological: history of numbness or paresthesias in the feet    OBJECTIVE     Vital Signs (Most Recent):  Temp: 97.9 °F (36.6 °C) (01/16/17 0400)  Pulse: 81 (01/16/17 0400)  Resp: 18 (01/16/17 0400)  BP: 121/79 (01/16/17 0400)  SpO2: 99 % (01/15/17 2000)    Vital Signs Range (Last 24H):  Temp:  [97.8 °F (36.6 °C)-98.3 °F (36.8 °C)]   Pulse:  [75-94]   Resp:  [18-19]   BP: (103-152)/(64-93)   SpO2:  [99 %]     Physical Exam:  General: Oriented to person, place, time, and situation. No acute distress.  Vascular: DP and PT pulses are 2+ bilaterally. CRT<3 seconds to digits 1-5 bilaterally. Marked edema to the right LE   Musculoskeletal: Equinus noted b/l ankles with < 10 deg DF noted. Strength 5/5 in DF/PF/Inv/Ev resistance with no reproduction of pain in any direction.   Neuro: Protective sensation absent bilateral   Derm: No open lesions, lacerations or wounds noted to the left foot.     Right Foot    Ulcer Location: Plantar first MTPJ  Measurements: 2.2 x 2.0 x 0.3 cm, probes to the dorsal foot and to bone  Periwound: Intact, hyperkeratotic  Drainage: Malodorous and scant purulence   Malodor: Present  Base:  100% granular.   Signs of infection: Slight erythema, scant purulence, malodor, probes to bone.    Ulcer Location: Plantar PIPJ  Measurements:1.0x1.0x0.3,  probes deep to bone  Periwound: Intact  Drainage: serosanguinous.  Pus: scant.  Malodor: Present.  Base:  100%  granular.  Signs of infection: Malodor and erythema.    Ulcer Location: Plantar distal phalanx of hallux  Measurements:.5x.5x0.4 cm  Periwound: Intact  Drainage: purulent  Pus: present  Malodor: Present.  Base:  100% granular, probes to bone  Signs of infection: Malodor and erythema, purulence                             Laboratory:  CBC:     Recent Labs  Lab 01/16/17 0359   WBC 5.91  5.91   RBC 3.84*  3.84*   HGB 10.8*  10.8*   HCT 34.4*  34.4*     279   MCV 90  90   MCH 28.1  28.1   MCHC 31.4*  31.4*     CMP:     Recent Labs  Lab 01/12/17 0228 01/16/17 0359   *  < > 198*  198*  198*   CALCIUM 9.9  < > 8.9  8.9   ALBUMIN 2.7*  --   --    PROT 7.9  --   --      < > 139  139   K 4.4  < > 3.6  3.6   CO2 26  < > 28  28   CL 96  < > 102  102   BUN 16  < > 8  8   CREATININE 1.1  < > 1.1  1.1   ALKPHOS 109  --   --    ALT 7*  --   --    AST 12  --   --    BILITOT 0.3  --   --    < > = values in this interval not displayed.  ESR:     Recent Labs  Lab 01/16/17 0359   SEDRATE 113*     CRP:     Recent Labs  Lab 01/16/17 0359   CRP 5.1     Microbiology Results (last 7 days)     Procedure Component Value Units Date/Time    Blood culture [[id number]] Collected:  01/12/17 0342    Order Status:  Completed Specimen:  Blood from Peripheral, Forearm, Left Updated:  01/15/17 0812     Blood Culture, Routine No Growth to date     Blood Culture, Routine No Growth to date     Blood Culture, Routine No Growth to date     Blood Culture, Routine No Growth to date    Blood culture [[id number]] Collected:  01/12/17 0230    Order Status:  Completed Specimen:  Blood from Peripheral, Right  Arm Updated:  01/15/17 0812     Blood Culture, Routine No Growth to date     Blood Culture, Routine No Growth to date     Blood Culture, Routine No Growth to date     Blood Culture, Routine No Growth to date    Aerobic culture (Specify Source) **CANNOT BE ORDERED AS STAT** [[id number]] Collected:  01/12/17 0433    Order  Status:  Completed Specimen:  Bone from Foot, Right Updated:  01/14/17 1134     Aerobic Bacterial Culture --     STREPTOCOCCUS AGALACTIAE (GROUP B)  Few  Susceptibility testing not routinely performed  Skin janes also present      Aerobic culture [[id number]] Collected:  01/12/17 1505    Order Status:  Completed Specimen:  Abscess from Foot, Right Updated:  01/14/17 0755     Aerobic Bacterial Culture No significant isolate to date    Gram stain [[id number]] Collected:  01/12/17 1505    Order Status:  Completed Specimen:  Abscess from Foot, Right Updated:  01/12/17 2216     Gram Stain Result Rare WBC's      Few Gram positive cocci    Culture, Anaerobe [[id number]] Collected:  01/12/17 1505    Order Status:  Sent Specimen:  Abscess from Foot, Right Updated:  01/12/17 2032    Blood culture [[id number]]     Order Status:  Completed Specimen:  Blood     Blood culture [[id number]]     Order Status:  Canceled Specimen:  Blood           Diagnostic Results:  X-Ray: reviewed  MRI: pending     Right foot x-ray:   Interval marked deformity and destruction about the first MTP joint involving the base of the first proximal phalanx and head of the first metatarsal highly concerning for septic arthritis/osteomyelitis. There is associated overlying cellulitis with focal plantar ulceration.    Interval partial amputation of the fifth metatarsal, noting slight irregularity with periosteal reaction involving the distal most portion of the remaining fifth metatarsal concerning for osteomyelitis.    MRI, right foot:   Status post metatarsal amputation of 5th ray.  There is advanced bone marrow replacement of the 1st ray involving the majority of the metatarsal as well as the phalanges.  There is associated cortical destruction, fragmentation of the 1st metatarsal head and extensive soft tissue edema.  There is a plantar ulcer in the region of the MTP joint with a 3 x 3 x 2 cm abscess replacing the joint.  Additional small ulcer noted at the  distal aspect of the toe.  Remaining rays demonstrate preserved marrow signal, without evidence for fracture or osteomyelitis.  Lisfranc ligament is intact.  1.  Great toe osteomyelitis involving the phalanges and majority of metatarsal.  Plantar ulcer with abscess at the MTP joint.  Additional small ulcer at the distal aspect of the toe.    ALISSA:The right ankle brachial index was 1.14 which is normal or elevated  The left ankle brachial index was 1.28 which is normal.   The waveforms of both lower extremities by Doppler and PVR's are diminished throughout suggesting arterial disease more significant than the resting ALISSA reflects. 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       ASSESSMENT/PLAN     Assessment:  -DM II with peripheral neuropathy  -Diabetic foot ulcer, right  -Diabetic foot infection with acute on chronic osteomyelitis of 1st ray.    Plan:    -Patient seen pre operatively, all questions were addressed. NPO since midnight, pre operative labs reviewed, ready for surgery.  -ID on board, appreciate their recommendations, more intraoperative cultures will be taken.  -Obtained verbal and written consent for I&D with removal of infected bone yesterday. Consent reviewed, co-signed by Dr. Wilkes and placed in chart.    - Long discussion with patient reagarding the procedure in detail. Patient understands all risks, potential complications, and alternatives, including, but not limited to those listed on the consent form. All questions were answered. No guarantees given nor implied as to outcome. Informed verbal and written consent was obtained. Consent forms read, signed, witnessed. Patient is ready for surgery    - Pt will likely require a first ray amputation given the amount of bone loss with wide spread osteomyelitis and pathologic fracture noted on x-ray and MRI. Discussed this with patient in detail. He will not consent to amputation at this point. He does agree to I&D and bone biopsy today.    -  Tentative plan to take back wed vs. Thursday for debridement, delay primary closure vs. 1st Toe/partial ray amputation, achilles/gastroc lengthening.    -If questions, please contact the on-call podiatry resident    Bill Muhammad DPM PGY-3  Pager [phone]

## 2017-01-16 NOTE — ANESTHESIA POSTPROCEDURE EVALUATION
"Anesthesia Post Evaluation    Patient: Indio Ryder Jr.    Procedure(s) Performed: Procedure(s) (LRB):  INCISION AND DRAINAGE (I&D), FOOT (Right)    Final Anesthesia Type: general                Visit Vitals    /72    Pulse 78    Temp 36.4 °C (97.6 °F) (Oral)    Resp 18    Ht 6' 1" (1.854 m)    Wt 93 kg (205 lb)    SpO2 98%    BMI 27.05 kg/m2       Pain/Jenny Score: Pain Assessment Performed: Yes (1/16/2017  1:07 PM)  Presence of Pain: denies (1/16/2017  1:07 PM)  Jenny Score: 9 (1/16/2017  9:24 AM)      "
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
## 2017-01-16 NOTE — PLAN OF CARE
Problem: Patient Care Overview  Goal: Plan of Care Review  Outcome: Ongoing (interventions implemented as appropriate)  Reviewed plan of care with patient. Patient verbalized understanding. AAOx3. I&D to right foot done today. Dressing to right foot CDI. Pt denies pain at this time. No distress noted. Bed alarm set, bed in lowest position, call bell in reach. Will continue to monitor and report to oncoming nurse.

## 2017-01-16 NOTE — ASSESSMENT & PLAN NOTE
45 y/o male with DM II and right foot ulcerations - history of longstanding ulcerations of this foot over 3 years - do not have old records. Patient had been on cipro and flagyl for about 6 months up until October 2016. He just restarted them PTA - had a few left at home.      Patient on vanco and zosyn empirically  So far group B strep from superficial cultures  Have discontinued vanco 1/14, continue zosyn for now     Need to see if patient has gotten tetanus booster - if not needs tdap

## 2017-01-17 PROBLEM — A42.9 ACTINOMYCES INFECTION: Status: ACTIVE | Noted: 2017-01-17

## 2017-01-17 LAB
BACTERIA BLD CULT: NORMAL
BACTERIA BLD CULT: NORMAL
BACTERIA SPEC ANAEROBE CULT: NORMAL
POCT GLUCOSE: 132 MG/DL (ref 70–110)
POCT GLUCOSE: 151 MG/DL (ref 70–110)
POCT GLUCOSE: 182 MG/DL (ref 70–110)
POCT GLUCOSE: 91 MG/DL (ref 70–110)

## 2017-01-17 PROCEDURE — 63600175 PHARM REV CODE 636 W HCPCS: Performed by: NURSE PRACTITIONER

## 2017-01-17 PROCEDURE — 11000001 HC ACUTE MED/SURG PRIVATE ROOM

## 2017-01-17 PROCEDURE — 25000003 PHARM REV CODE 250: Performed by: NURSE PRACTITIONER

## 2017-01-17 PROCEDURE — 0Q9N3ZZ DRAINAGE OF RIGHT METATARSAL, PERCUTANEOUS APPROACH: ICD-10-PCS | Performed by: PODIATRIST

## 2017-01-17 PROCEDURE — 0QBN0ZZ EXCISION OF RIGHT METATARSAL, OPEN APPROACH: ICD-10-PCS | Performed by: PODIATRIST

## 2017-01-17 RX ADMIN — INSULIN ASPART 8 UNITS: 100 INJECTION, SOLUTION INTRAVENOUS; SUBCUTANEOUS at 04:01

## 2017-01-17 RX ADMIN — MICONAZOLE NITRATE: 20 CREAM TOPICAL at 08:01

## 2017-01-17 RX ADMIN — ACETAMINOPHEN 650 MG: 325 TABLET ORAL at 09:01

## 2017-01-17 RX ADMIN — INSULIN DETEMIR 15 UNITS: 100 INJECTION, SOLUTION SUBCUTANEOUS at 08:01

## 2017-01-17 RX ADMIN — PIPERACILLIN SODIUM AND TAZOBACTAM SODIUM 4.5 G: 4; .5 INJECTION, POWDER, LYOPHILIZED, FOR SOLUTION INTRAVENOUS at 05:01

## 2017-01-17 RX ADMIN — PIPERACILLIN SODIUM AND TAZOBACTAM SODIUM 4.5 G: 4; .5 INJECTION, POWDER, LYOPHILIZED, FOR SOLUTION INTRAVENOUS at 08:01

## 2017-01-17 RX ADMIN — INSULIN ASPART 8 UNITS: 100 INJECTION, SOLUTION INTRAVENOUS; SUBCUTANEOUS at 07:01

## 2017-01-17 RX ADMIN — ENOXAPARIN SODIUM 40 MG: 100 INJECTION SUBCUTANEOUS at 12:01

## 2017-01-17 RX ADMIN — PIPERACILLIN SODIUM AND TAZOBACTAM SODIUM 4.5 G: 4; .5 INJECTION, POWDER, LYOPHILIZED, FOR SOLUTION INTRAVENOUS at 12:01

## 2017-01-17 NOTE — OP NOTE
Date: 1/16/17    Surgeon: Dr. Wilkes, [facility]; Dr. Jb DPM PGY-3 First assist    Preoperative Diagnosis:    Diabetic foot Infection with osteomyelitis, right  Diabetic ulcer of right foot associated with type 2 diabetes mellitus  Subacute osteomyelitis of right foot, hallux and 1st metatarsal  Equinus, right  History of partial 5th ray amputation, right    Postoperative Diagnosis: Same    Procedure Performed:  INCISION AND DRAINAGE (I&D), FOOT to bone (Right)    Anaesthesia: General    Hemostasis:  Ankle tourniquet set at 250 mmHg for a total of 32 minutes    Estimated Blood Loss: 20 mL    Specimen:   Specimen (12h ago through future)    Start       Ordered     01/16/17 0782   Specimen to Pathology - Surgery Once    Comments: 1. 1st ray right foot  2. Right 1st metatarsal - clean margin     01/16/17 0768     Culture: Aerobic, Anaerobic, Acid Fast and Fungal.    Complications: None    Condition: Stable    PRE-PROCEDURE:   The patient was brought into the operating room and placed on the operating table in the supine position.  The foot and leg was scrubbed, prepped, and draped in the usual aseptic manner.     PROCEDURE:   The right foot was elevated for exsanguination and the tourniquet was inflated to 250 mmHg. Attention was directed to right hallux where an ulcer was noted at the plantar first MTPJ probing to bone with purulent drainage. Another ulcer was noted at the IPJ of the right hallux and distal tip of the hallux with purulent drainage probing to bone. A curvilinear incision was made from the medial aspect of the first metatarsal shaft then plantar and distal through the plantar ulcer moving distal to the base of the proximal phalanx. The plantar MTPJ ulcer was excised full thickness. The incision was deepened down to bone and the distal half of the first metatarsal was freed from soft tissue attachments. It was noted that the distal half of the first metatarsal was fragmented and soft with only  minimal fragments of bone remaining. A pathology fracture was noted at the neck of the bone. Purulence was noted within the metatarsal shaft. A sagittal saw was used to cut the first metatarsal at the level of remaining hard, normal appearing bone near the base of the metatarsal, removing all necrotic bone from the area. All necrotic and non viable soft tissue was also excised with a rongeur and scissors. The base of the proximal phalanx was then freed from soft tissue attachments with a #15 blade. The cartilage was fragmented and the base and necrotic defect was noted. The base was then emoved with a sagittal saw.    Attention was then directed to the plantar aspect of the right hallux where a longitudinal incision was made ellipsing out the plantar IPJ ulcer. The incision was deepened to the level of bone noting purulent drainage and malodor throughout the area. Liquefactive necrosis was noted. All non viable, necrotic and infected soft tissue and bone was removed at the hallux IPJ and tip of the distal phalanx with rongure. Bone was sent for culture and path. The tourniquet was released and hemostasis was achieved with electrocautery. Both incisions were pulse lavaged with 3 L sterile saline. Good vascular perfusion was noted to the remaining tissues. The incisions were closed with retention sutures and the remaining cavities packed with iodoform. Local block with 15 mL 0.25 % marcaine plain was then injected post operatively for pain management.   The incisions were covered with betadine soaked 4x4's, fluffed 4x4's, and the foot wrapped with kerlix, cast padding and ACE.  The patient tolerated the procedure and anesthesia well. The patient was transported to recovery with vital signs stable and vascular status intact. He will be readmitted to medicine with plan for return to OR in 2-3 days for washout, possible amputation.       Bill Muhammad DPM PGY-3  Pager [phone]    I attest to the note above.  Ramirez  CLEM Wilkes

## 2017-01-17 NOTE — PLAN OF CARE
Problem: Patient Care Overview  Goal: Plan of Care Review  Outcome: Ongoing (interventions implemented as appropriate)  AAOX3. Respirations even and unlabored; breath sounds clear.  Denies n/v and sob.  C/o HA; tylenol 650mg po admin and pain decreased from 5/10 to 0.  Right foot dressing of ace wrap and kerlex clean, dry, and intact; right foot elevated on pillow.  Patient refused to have his left foot cleaned and antifungal cream applied stating that the previous nurse did in once and he did it earlier in the afternoon.  Afebrile.  Bed alarm on.  Instructed to call for assistance.  Will cont to monitor.

## 2017-01-17 NOTE — SUBJECTIVE & OBJECTIVE
Interval History: NAEON.  S/p resection of the first metatarsal head and base of the proximal phalanx, and I&D of the hallux at the IPJ level, with partial closure with retention sutures and iodoform packing, as part one of a staged procedure.  Patient denies shortness of breath, chest pain, fever, chills.  Reports that he has received a tetanus booster within last 10 years.      Review of Systems   Constitutional: Negative for chills and fever.   Respiratory: Negative for cough and shortness of breath.    Cardiovascular: Negative for chest pain and palpitations.   Gastrointestinal: Negative for abdominal pain, nausea and vomiting.   Musculoskeletal: Negative for arthralgias.     Objective:     Vital Signs (Most Recent):  Temp: 99.2 °F (37.3 °C) (01/17/17 0415)  Pulse: 94 (01/17/17 0415)  Resp: 18 (01/17/17 0415)  BP: 132/74 (01/17/17 0415)  SpO2: 98 % (01/16/17 1256) Vital Signs (24h Range):  Temp:  [97.4 °F (36.3 °C)-99.2 °F (37.3 °C)] 99.2 °F (37.3 °C)  Pulse:  [78-98] 94  Resp:  [16-20] 18  SpO2:  [98 %-100 %] 98 %  BP: ()/(54-93) 132/74     Weight: 93 kg (205 lb)  Body mass index is 27.05 kg/(m^2).    Intake/Output Summary (Last 24 hours) at 01/17/17 0535  Last data filed at 01/16/17 2100   Gross per 24 hour   Intake              340 ml   Output             2550 ml   Net            -2210 ml      Physical Exam   Constitutional: He is oriented to person, place, and time. He appears well-developed and well-nourished.   HENT:   Head: Normocephalic and atraumatic.   Mouth/Throat: Oropharynx is clear and moist.   Eyes: EOM are normal. Pupils are equal, round, and reactive to light.   Neck: Normal range of motion. Neck supple.   Cardiovascular: Normal rate and regular rhythm.    Pulmonary/Chest: Effort normal and breath sounds normal. No respiratory distress.   Abdominal: Soft. Bowel sounds are normal. He exhibits no distension. There is no tenderness.   Musculoskeletal: He exhibits no tenderness.    Neurological: He is alert and oriented to person, place, and time.   Skin: No cyanosis. Nails show no clubbing.   Intact surgical dressing to right foot.   Psychiatric: He has a normal mood and affect. His behavior is normal.   Nursing note and vitals reviewed.      Significant Labs:   CBC:   Recent Labs  Lab 01/16/17 0359   WBC 5.91  5.91   HGB 10.8*  10.8*   HCT 34.4*  34.4*     279     CMP:   Recent Labs  Lab 01/16/17 0359     139   K 3.6  3.6     102   CO2 28  28   *  198*  198*   BUN 8  8   CREATININE 1.1  1.1   CALCIUM 8.9  8.9   ANIONGAP 9  9   EGFRNONAA >60  >60     All pertinent labs within the past 24 hours have been reviewed.    Significant Imaging: I have reviewed all pertinent imaging results/findings within the past 24 hours.

## 2017-01-17 NOTE — PROGRESS NOTES
Rounded on patient. Patient states he DOES have insurance. Informed patient that we do NOT have a record of his insurance. He does not have an insurance card. Gave him my card and admitting phone number. Patient getting on phone to get information on insurance to give to admitting to verify his insurance.     Per podiatry's notes, patient to have surgery Thursday for R. Great toe amputation.    Geetha Moraes, RN, [facility], CMSRN  RN Transition Navigator  [phone]

## 2017-01-17 NOTE — ASSESSMENT & PLAN NOTE
Takes metformin 500 mg BID and insulin glargine 25 units HS at home. Hemoglobin A1c 15%. BS range 83 to 163; continue insulin detemir 15 units am 25 units HS, insulin aspart 8 units TID with meals, moderate dose insulin sliding scale.

## 2017-01-17 NOTE — PROGRESS NOTES
"[facility]  Progress Note    Patient Name: Indio Ryder Jr.  MRN: [id number]  Patient Class: IP- Inpatient   Admission Date: 1/12/2017  Length of Stay: 5 days  Attending Physician: Bharathi Terrazas MD  Primary Care Provider: Lorena Thakur MD        Subjective:     Principal Problem:Subacute osteomyelitis of right foot    HPI:  Indio Ryder Jr. Is a 46 y.o.  man with hypertension and diabetes mellitus type 2 with peripheral neuropathy, s/p 5th toe ray amputation on 6/05/15. He lives in [place], [place]. He is single. He works in [employer] at [email or web address]. His primary care physician is Dr. Lorena Thakur. His podiatrist is Dr. William Manuel.    He lost his insurance and was unable to continue following up with Dr. Manuel in October 2016. He had taken ciprofloxacin and metronidazole for 6 months up until then. He was performing wound care himself at home "a couple of times per week". He noticed malodorous drainage, redness and swelling of his right foot prior to Marc, accompanied by fever, chills, and fatigue. He regained insurance beginning January 1, 2017.  Dr. Manuel saw him in his clinic on 1/10/17 and instructed him to go to an ED for IV antibiotics and to have "Dr. Matthew take a look at it". Dr. Matthew is a general surgeon at [facility]. He went to [facility], where there are also podiatrists, on 1/12/17.    He was afebrile, but pulse was 110s. WBC count was 9520 and hemoglobin and hematocrit were 11.8 and 37. ESR was 113, CRP was 19.6. He was hyperglycemic (glucose 421). X-ray of the right foot showed cellulitis and osteomyelitis of the right 1st and 5th metatarsals. He was given 1 liter normal saline, ceftriaxone, and vancomycin in the ED. He was admitted to [facility].       Hospital Course:  Vancomycin was continued and ceftriaxone changed to piperacillin-tazobactam. Podiatry was " consulted and performed bedside debridement and culture. He reported that he had been hyperglycemic recently due to the infection. Hemoglobin A1c was 15%. MRI showed osteomyelitis of most of the 1st metatarsal and the phalanges. Streptococcus agalactiae grew in bedside wound culture. Scheduled insulin doses were increased much higher than home doses to treat hyperglycemia. On 1/16/17 Dr. Ramirez Wilkes performed resection of the first metatarsal head and base of the proximal phalanx, and I&D of the hallux at the IPJ level, with partial closure with retention sutures and iodoform packing, as part one of a staged procedure.     Interval History: NAEON.  S/p resection of the first metatarsal head and base of the proximal phalanx, and I&D of the hallux at the IPJ level, with partial closure with retention sutures and iodoform packing, as part one of a staged procedure.  Patient denies shortness of breath, chest pain, fever, chills.  Reports that he has received a tetanus booster within last 10 years.      Review of Systems   Constitutional: Negative for chills and fever.   Respiratory: Negative for cough and shortness of breath.    Cardiovascular: Negative for chest pain and palpitations.   Gastrointestinal: Negative for abdominal pain, nausea and vomiting.   Musculoskeletal: Negative for arthralgias.     Objective:     Vital Signs (Most Recent):  Temp: 99.2 °F (37.3 °C) (01/17/17 0415)  Pulse: 94 (01/17/17 0415)  Resp: 18 (01/17/17 0415)  BP: 132/74 (01/17/17 0415)  SpO2: 98 % (01/16/17 1256) Vital Signs (24h Range):  Temp:  [97.4 °F (36.3 °C)-99.2 °F (37.3 °C)] 99.2 °F (37.3 °C)  Pulse:  [78-98] 94  Resp:  [16-20] 18  SpO2:  [98 %-100 %] 98 %  BP: ()/(54-93) 132/74     Weight: 93 kg (205 lb)  Body mass index is 27.05 kg/(m^2).    Intake/Output Summary (Last 24 hours) at 01/17/17 05  Last data filed at 01/16/17 2100   Gross per 24 hour   Intake              340 ml   Output             2550 ml   Net             -2210 ml      Physical Exam   Constitutional: He is oriented to person, place, and time. He appears well-developed and well-nourished.   HENT:   Head: Normocephalic and atraumatic.   Mouth/Throat: Oropharynx is clear and moist.   Eyes: EOM are normal. Pupils are equal, round, and reactive to light.   Neck: Normal range of motion. Neck supple.   Cardiovascular: Normal rate and regular rhythm.    Pulmonary/Chest: Effort normal and breath sounds normal. No respiratory distress.   Abdominal: Soft. Bowel sounds are normal. He exhibits no distension. There is no tenderness.   Musculoskeletal: He exhibits no tenderness.   Neurological: He is alert and oriented to person, place, and time.   Skin: No cyanosis. Nails show no clubbing.   Intact surgical dressing to right foot.   Psychiatric: He has a normal mood and affect. His behavior is normal.   Nursing note and vitals reviewed.      Significant Labs:   CBC:   Recent Labs  Lab 01/16/17  0359   WBC 5.91  5.91   HGB 10.8*  10.8*   HCT 34.4*  34.4*     279     CMP:   Recent Labs  Lab 01/16/17  0359     139   K 3.6  3.6     102   CO2 28  28   *  198*  198*   BUN 8  8   CREATININE 1.1  1.1   CALCIUM 8.9  8.9   ANIONGAP 9  9   EGFRNONAA >60  >60     All pertinent labs within the past 24 hours have been reviewed.    Significant Imaging: I have reviewed all pertinent imaging results/findings within the past 24 hours.    Assessment/Plan:      * Subacute osteomyelitis of right foot  Diabetic ulcer of right foot associated with type 2 diabetes mellitus    S/p resection of the first metatarsal head and base of the proximal phalanx, and I&D of the hallux at the IPJ level, with partial closure with retention sutures and iodoform packing, as part one of a staged procedure. Podiatry planning to return to OR later this week for further debridement and amputation.  Continue piperacillin-tazobactam empirically. Streptococcus agalactiae grew in bedside  wound culture. Appreciate Infectious Disease and Podiatry.       Essential hypertension  SBP range 83 to 149.  Not requiring medications at this time.      Hyperlipidemia  Not currently on medications.      Diabetic ulcer of right foot associated with type 2 diabetes mellitus  S/p debridement.  Will require further debridement later this week.  Appreciate podiatry assistance.      Type 2 diabetes mellitus with diabetic neuropathy, with long-term current use of insulin  Takes metformin 500 mg BID and insulin glargine 25 units HS at home. Hemoglobin A1c 15%. BS range 83 to 163; continue insulin detemir 15 units am 25 units HS, insulin aspart 8 units TID with meals, moderate dose insulin sliding scale.       Tinea pedis of left foot  Continue miconazole cream.      Group B streptococcal infection  Continue zosyn.      VTE Risk Mitigation         Ordered     enoxaparin injection 40 mg  Daily     Route:  Subcutaneous        01/12/17 1239     Medium Risk of VTE  Once      01/12/17 1239          John Kim NP  Department of Hospital Medicine   [facility]

## 2017-01-17 NOTE — SUBJECTIVE & OBJECTIVE
Interval History: Patient with no acute events over night.     HPI:  45 y/o male with HTN, DM II, with peripheral neuropathy. Patient has had diabetic ulcer of right foot followed by podiatry as outpatient (Dr. William Manuel) up until October when patient lost insurance benefits. Patient was doing own wound care a few times a week. He noticed malodorous drainage and swelling and erythema in December prior to ann. Patient regained insurance Jan 1, 2017 and went to podiatry on 1/10 and he was instructed to come to the ED. Patient presented to ED 1/12/17 - x ray of right foot concerning for osteo of 1st and 5th MT and cellulitis. Patient was given 1L NS and vanco and ceftriaxone in ED and admitted to hospital medicine. ED reports patient has history of 3 years of ongoing ulcer to right foot. MRI of foot ordered. Podiatry consulted. Patient placed on vanco and zosyn on admit.     Patient stated he had been on po cipro and flagyl for about 6 months until October. He had a few antibiotic pills left and he took them recently when he started to have drainage from right foot PTA. Patient does not recall last tetanus shot.     1/12 right foot culture - positive for group B strep  1/14 vancomycin discontinued, zosyn continued  1/15 Podiatry performed I + D right great toe            Review of Systems   Respiratory: Negative for shortness of breath.    Gastrointestinal: Negative for diarrhea, nausea and vomiting.     Objective:     Vital Signs (Most Recent):  Temp: 98.9 °F (37.2 °C) (01/17/17 1600)  Pulse: 87 (01/17/17 1600)  Resp: 18 (01/17/17 1600)  BP: 139/87 (01/17/17 1600)  SpO2: 98 % (01/16/17 1256) Vital Signs (24h Range):  Temp:  [97.9 °F (36.6 °C)-99.2 °F (37.3 °C)] 98.9 °F (37.2 °C)  Pulse:  [] 87  Resp:  [18] 18  BP: (129-143)/(66-93) 139/87     Weight: 93 kg (205 lb)  Body mass index is 27.05 kg/(m^2).    Estimated Creatinine Clearance: 92.8 mL/min (based on Cr of 1.1).    Physical Exam    Cardiovascular: Regular rhythm.    No murmur heard.  Pulmonary/Chest: Breath sounds normal. No respiratory distress.   Abdominal: Soft. Bowel sounds are normal. He exhibits no distension. There is no tenderness.   Musculoskeletal: He exhibits no edema.   Right foot bandaged. Left foot with tinea pedis       Significant Labs:   Blood Culture:   Recent Labs  Lab 17  0230 17  0342   LABBLOO No growth after 5 days. No growth after 5 days.     BMP:   Recent Labs  Lab 17  0359   *  198*  198*     139   K 3.6  3.6     102   CO2 28  28   BUN 8  8   CREATININE 1.1  1.1   CALCIUM 8.9  8.9     CBC:   Recent Labs  Lab 17  0359   WBC 5.91  5.91   HGB 10.8*  10.8*   HCT 34.4*  34.4*     279     CMP:   Recent Labs  Lab 17  0359     139   K 3.6  3.6     102   CO2 28  28   *  198*  198*   BUN 8  8   CREATININE 1.1  1.1   CALCIUM 8.9  8.9   ANIONGAP 9  9   EGFRNONAA >60  >60     Urine Studies:   Recent Labs  Lab 17  1341   COLORU Yellow   APPEARANCEUA Clear   PHUR 6.0   SPECGRAV 1.025   PROTEINUA Negative   GLUCUA 3+*   KETONESU 1+*   BILIRUBINUA Negative   OCCULTUA Negative   NITRITE Negative   UROBILINOGEN Negative   LEUKOCYTESUR Negative   BACTERIA None       Significant Imagin/16 x ray right foot - postsurgical changes

## 2017-01-17 NOTE — PROGRESS NOTES
Progress Note  Podiatry      SUBJECTIVE     History of Present Illness:  Indio Ryder Jr. is a 46 y.o. male who  has a past medical history of Diabetes mellitus type II; Diabetic foot ulcer; Hyperlipidemia; Hypertension; Peripheral neuropathy; and Ulcerative colitis, unspecified. He has had an ulcer sub first MTPJ right foot off and on for several years. He is a patient of Dr. Manuel's outpatient who sent him in to the ED for osteomyelitis. Patient relates that he did not see Dr. Manuel for a long period prior to this most recent visit due to insurance reasons. He has been trying to treat this ulcer himself bandaging it daily. Denies f/c/n/v. History of partial 5th ray amputation about 2 yrs ago with no complication.    1/17/17: Patient seen bedside s/p day 1 right foot I&D with first metatarsal resection. Resting comfortably in bed. Remaining non weight bearing to the right foot.    Scheduled Meds:   enoxaparin  40 mg Subcutaneous Daily    insulin aspart  8 Units Subcutaneous TIDWM    insulin detemir  15 Units Subcutaneous Daily    insulin detemir  25 Units Subcutaneous QHS    miconazole   Topical (Top) BID    piperacillin-tazobactam 4.5 g in dextrose 5 % 100 mL IVPB (ready to mix system)  4.5 g Intravenous Q8H     Continuous Infusions:     PRN Meds:acetaminophen, dextrose 50%, dextrose 50%, glucagon (human recombinant), glucose, glucose, insulin aspart, ondansetron, oxycodone, oxycodone, promethazine (PHENERGAN) IVPB, promethazine (PHENERGAN) IVPB, ramelteon    Review of patient's allergies indicates:  No Known Allergies     Past Medical History   Diagnosis Date    Diabetes mellitus type II     Diabetic foot ulcer     Hyperlipidemia     Hypertension     Peripheral neuropathy     Ulcerative colitis, unspecified      Past Surgical History   Procedure Laterality Date    Toe amputation Right 06/05/2015     Family History   Problem Relation Age of Onset    Adopted: Yes    Hypertension Mother      Cancer Mother      breast    Diabetes Mother     Hypertension Father     Cataracts Father     Diabetes Sister     Hypertension Maternal Aunt      Social History   Substance Use Topics    Smoking status: Never Smoker    Smokeless tobacco: None    Alcohol use No       Review of Systems:  Constitutional: no fever or chills, pain well controlled  Respiratory: no cough or shortness of breath  Cardiovascular: no chest pain or palpitations  Gastrointestinal: no nausea or vomiting, tolerating diet  Musculoskeletal: no arthralgias or myalgias  Neurological: history of numbness or paresthesias in the feet    OBJECTIVE     Vital Signs (Most Recent):  Temp: 97.9 °F (36.6 °C) (01/17/17 1200)  Pulse: 107 (01/17/17 1200)  Resp: 18 (01/17/17 1200)  BP: 129/66 (01/17/17 1200)  SpO2: 98 % (01/16/17 1256)    Vital Signs Range (Last 24H):  Temp:  [97.9 °F (36.6 °C)-99.2 °F (37.3 °C)]   Pulse:  []   Resp:  [18]   BP: (129-149)/(66-93)     Physical Exam:  General: Oriented to person, place, time, and situation. No acute distress.  Vascular: DP and PT pulses are 2+ bilaterally. CRT<3 seconds to digits 1-5 bilaterally. Marked edema to the right LE   Musculoskeletal: Equinus noted b/l ankles with < 10 deg DF noted. Strength 5/5 in DF/PF/Inv/Ev resistance with no reproduction of pain in any direction.   Neuro: Protective sensation absent bilateral   Derm: No open lesions, lacerations or wounds noted to the left foot.     Right Foot. Incisions well coapted with sutures in place. Sanguinous drainage noted, no purulence, no malodor and no erythema.     Laboratory:  CBC:     Recent Labs  Lab 01/16/17  0359   WBC 5.91  5.91   RBC 3.84*  3.84*   HGB 10.8*  10.8*   HCT 34.4*  34.4*     279   MCV 90  90   MCH 28.1  28.1   MCHC 31.4*  31.4*     CMP:     Recent Labs  Lab 01/12/17  0228  01/16/17  0359   *  < > 198*  198*  198*   CALCIUM 9.9  < > 8.9  8.9   ALBUMIN 2.7*  --   --    PROT 7.9  --   --       < > 139  139   K 4.4  < > 3.6  3.6   CO2 26  < > 28  28   CL 96  < > 102  102   BUN 16  < > 8  8   CREATININE 1.1  < > 1.1  1.1   ALKPHOS 109  --   --    ALT 7*  --   --    AST 12  --   --    BILITOT 0.3  --   --    < > = values in this interval not displayed.  ESR:     Recent Labs  Lab 01/16/17  0359   SEDRATE 113*     CRP:     Recent Labs  Lab 01/16/17  0359   CRP 5.1     Microbiology Results (last 7 days)     Procedure Component Value Units Date/Time    AFB Culture & Smear [[id number]] Collected:  01/16/17 0718    Order Status:  Completed Specimen:  Tissue from Toe, Right Foot Updated:  01/17/17 1304     AFB CULTURE STAIN No acid fast bacilli seen.    Narrative:       1st ray    Fungus culture [[id number]] Collected:  01/16/17 0718    Order Status:  Completed Specimen:  Tissue from Toe, Right Foot Updated:  01/17/17 1142     Fungus (Mycology) Culture Culture in progress    Narrative:       1st ray    Culture, Anaerobe [[id number]] Collected:  01/12/17 1505    Order Status:  Completed Specimen:  Abscess from Foot, Right Updated:  01/17/17 1112     Anaerobic Culture --     ACTINOMYCES ODONTOLYTICUS  Many      Blood culture [[id number]] Collected:  01/12/17 0342    Order Status:  Completed Specimen:  Blood from Peripheral, Forearm, Left Updated:  01/17/17 0812     Blood Culture, Routine No growth after 5 days.    Blood culture [[id number]] Collected:  01/12/17 0230    Order Status:  Completed Specimen:  Blood from Peripheral, Right  Arm Updated:  01/17/17 0812     Blood Culture, Routine No growth after 5 days.    Culture, Anaerobe [[id number]] Collected:  01/16/17 0718    Order Status:  Completed Specimen:  Tissue from Toe, Right Foot Updated:  01/17/17 0741     Anaerobic Culture Culture in progress    Narrative:       1st ray    Gram stain [[id number]] Collected:  01/16/17 0718    Order Status:  Completed Specimen:  Tissue from Toe, Right Foot Updated:  01/16/17 1302     Gram Stain Result Few WBC's      Moderate  Gram positive cocci      Rare Gram negative rods      Rare Gram positive rods    Narrative:       1st ray    Aerobic culture [[id number]] Collected:  01/12/17 1505    Order Status:  Completed Specimen:  Abscess from Foot, Right Updated:  01/16/17 1202     Aerobic Bacterial Culture Skin janes,  no predominant organism    Aerobic culture [[id number]] Collected:  01/16/17 0718    Order Status:  Sent Specimen:  Tissue from Toe, Right Foot Updated:  01/16/17 1040    Narrative:       1st ray    Aerobic culture (Specify Source) **CANNOT BE ORDERED AS STAT** [[id number]] Collected:  01/12/17 0433    Order Status:  Completed Specimen:  Bone from Foot, Right Updated:  01/14/17 1134     Aerobic Bacterial Culture --     STREPTOCOCCUS AGALACTIAE (GROUP B)  Few  Susceptibility testing not routinely performed  Skin janes also present      Gram stain [[id number]] Collected:  01/12/17 1505    Order Status:  Completed Specimen:  Abscess from Foot, Right Updated:  01/12/17 2216     Gram Stain Result Rare WBC's      Few Gram positive cocci    Blood culture [[id number]]     Order Status:  Completed Specimen:  Blood     Blood culture [[id number]]     Order Status:  Canceled Specimen:  Blood           Diagnostic Results:  X-Ray: reviewed  MRI: pending     Right foot x-ray:   Interval marked deformity and destruction about the first MTP joint involving the base of the first proximal phalanx and head of the first metatarsal highly concerning for septic arthritis/osteomyelitis. There is associated overlying cellulitis with focal plantar ulceration.    Interval partial amputation of the fifth metatarsal, noting slight irregularity with periosteal reaction involving the distal most portion of the remaining fifth metatarsal concerning for osteomyelitis.    MRI, right foot:   Status post metatarsal amputation of 5th ray.  There is advanced bone marrow replacement of the 1st ray involving the majority of the metatarsal as well as the phalanges.  There  is associated cortical destruction, fragmentation of the 1st metatarsal head and extensive soft tissue edema.  There is a plantar ulcer in the region of the MTP joint with a 3 x 3 x 2 cm abscess replacing the joint.  Additional small ulcer noted at the distal aspect of the toe.  Remaining rays demonstrate preserved marrow signal, without evidence for fracture or osteomyelitis.  Lisfranc ligament is intact.  1.  Great toe osteomyelitis involving the phalanges and majority of metatarsal.  Plantar ulcer with abscess at the MTP joint.  Additional small ulcer at the distal aspect of the toe.    ALISSA:The right ankle brachial index was 1.14 which is normal or elevated  The left ankle brachial index was 1.28 which is normal.   The waveforms of both lower extremities by Doppler and PVR's are diminished throughout suggesting arterial disease more significant than the resting ALISSA reflects. 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       ASSESSMENT/PLAN     Assessment:  -DM II with peripheral neuropathy  -Diabetic foot ulcer, right  -Diabetic foot infection with acute on chronic osteomyelitis of 1st ray s/p day 1 I&D and resection of first metatarsal.    Plan:  - Long discussion with patient and family over the need for a right great toe amputation and RUSLAN. Upon reviewing the risks/benefits,  the planned procedure, the surgical goal, and the postoperative course for the right foot first ray amputation and RUSLAN, the written consent was signed, timed, and dated by the patient with a witness present. He is ready to go forward with the procedure and understands the risks and benefits and post operative course.     - Case request set for Thursday at 9AM.    -If questions, please contact the on-call podiatry resident    Bill Muhammad DPM PGY-3  Pager [phone]

## 2017-01-17 NOTE — PROGRESS NOTES
[facility]  Infectious Disease  Progress Note    Patient Name: Indio Ryder Jr.  MRN: [id number]  Admission Date: 1/12/2017  Length of Stay: 5 days  Attending Physician: Bharathi Terrazas MD  Primary Care Provider: Lorena Thakur MD    Isolation Status: No active isolations  Assessment/Plan:      * Subacute osteomyelitis of right foot  47 y/o male with DM II and right foot ulcerations - history of longstanding ulcerations of this foot over 3 years - do not have old records. Patient had been on cipro and flagyl for about 6 months up until October 2016. He just restarted them PTA - had a few left at home.      Patient placed on vanco and zosyn empirically  So far group B strep AND actinomyces odontolyticus from superficial cultures  Have discontinued vanco 1/14, continue zosyn for now     Need to see if patient has gotten tetanus booster - if not needs tdap       Group B streptococcal infection  Right foot positive for GBS - superficial cultures (also positive for actinomyces)    Actinomyces infection  Right foot positive for actinomyces odontolyticus (also Group B strep)    Diabetic ulcer of right foot associated with type 2 diabetes mellitus  Management of DM per primary    Tinea pedis of left foot  Topical antifungal prescribed      Anticipated Disposition: unclear at this time- await Podiatry evaluation for possible further surgery    Thank you for your consult. I will follow-up with patient. Please contact us if you have any additional questions.[phone]    Subjective:     Principal Problem:Subacute osteomyelitis of right foot    Interval History: Patient with no acute events over night.     HPI:  47 y/o male with HTN, DM II, with peripheral neuropathy. Patient has had diabetic ulcer of right foot followed by podiatry as outpatient (Dr. William Manuel) up until October when patient lost insurance benefits. Patient was doing own wound care a few times a week. He noticed malodorous drainage  and swelling and erythema in December prior to ann. Patient regained insurance Jan 1, 2017 and went to podiatry on 1/10 and he was instructed to come to the ED. Patient presented to ED 1/12/17 - x ray of right foot concerning for osteo of 1st and 5th MT and cellulitis. Patient was given 1L NS and vanco and ceftriaxone in ED and admitted to hospital medicine. ED reports patient has history of 3 years of ongoing ulcer to right foot. MRI of foot ordered. Podiatry consulted. Patient placed on vanco and zosyn on admit.     Patient stated he had been on po cipro and flagyl for about 6 months until October. He had a few antibiotic pills left and he took them recently when he started to have drainage from right foot PTA. Patient does not recall last tetanus shot.     1/12 right foot culture - positive for group B strep  1/14 vancomycin discontinued, zosyn continued  1/15 Podiatry performed I + D right great toe            Review of Systems   Respiratory: Negative for shortness of breath.    Gastrointestinal: Negative for diarrhea, nausea and vomiting.     Objective:     Vital Signs (Most Recent):  Temp: 98.9 °F (37.2 °C) (01/17/17 1600)  Pulse: 87 (01/17/17 1600)  Resp: 18 (01/17/17 1600)  BP: 139/87 (01/17/17 1600)  SpO2: 98 % (01/16/17 1256) Vital Signs (24h Range):  Temp:  [97.9 °F (36.6 °C)-99.2 °F (37.3 °C)] 98.9 °F (37.2 °C)  Pulse:  [] 87  Resp:  [18] 18  BP: (129-143)/(66-93) 139/87     Weight: 93 kg (205 lb)  Body mass index is 27.05 kg/(m^2).    Estimated Creatinine Clearance: 92.8 mL/min (based on Cr of 1.1).    Physical Exam   Cardiovascular: Regular rhythm.    No murmur heard.  Pulmonary/Chest: Breath sounds normal. No respiratory distress.   Abdominal: Soft. Bowel sounds are normal. He exhibits no distension. There is no tenderness.   Musculoskeletal: He exhibits no edema.   Right foot bandaged. Left foot with tinea pedis       Significant Labs:   Blood Culture:   Recent Labs  Lab 01/12/17  0230  17  0342   LABBLOO No growth after 5 days. No growth after 5 days.     BMP:   Recent Labs  Lab 17  0359   *  198*  198*     139   K 3.6  3.6     102   CO2 28  28   BUN 8  8   CREATININE 1.1  1.1   CALCIUM 8.9  8.9     CBC:   Recent Labs  Lab 17  0359   WBC 5.91  5.91   HGB 10.8*  10.8*   HCT 34.4*  34.4*     279     CMP:   Recent Labs  Lab 17  0359     139   K 3.6  3.6     102   CO2 28  28   *  198*  198*   BUN 8  8   CREATININE 1.1  1.1   CALCIUM 8.9  8.9   ANIONGAP 9  9   EGFRNONAA >60  >60     Urine Studies:   Recent Labs  Lab 17  1341   COLORU Yellow   APPEARANCEUA Clear   PHUR 6.0   SPECGRAV 1.025   PROTEINUA Negative   GLUCUA 3+*   KETONESU 1+*   BILIRUBINUA Negative   OCCULTUA Negative   NITRITE Negative   UROBILINOGEN Negative   LEUKOCYTESUR Negative   BACTERIA None       Significant Imagin/16 x ray right foot - postsurgical changes        Rola Mckinney MD  Infectious Disease  [facility]

## 2017-01-17 NOTE — ASSESSMENT & PLAN NOTE
Diabetic ulcer of right foot associated with type 2 diabetes mellitus    S/p resection of the first metatarsal head and base of the proximal phalanx, and I&D of the hallux at the IPJ level, with partial closure with retention sutures and iodoform packing, as part one of a staged procedure. Podiatry planning to return to OR later this week for further debridement and amputation.  Continue piperacillin-tazobactam empirically. Streptococcus agalactiae grew in bedside wound culture. Appreciate Infectious Disease and Podiatry.

## 2017-01-17 NOTE — ASSESSMENT & PLAN NOTE
S/p debridement.  Will require further debridement later this week.  Appreciate podiatry assistance.

## 2017-01-17 NOTE — ASSESSMENT & PLAN NOTE
45 y/o male with DM II and right foot ulcerations - history of longstanding ulcerations of this foot over 3 years - do not have old records. Patient had been on cipro and flagyl for about 6 months up until October 2016. He just restarted them PTA - had a few left at home.      Patient placed on vanco and zosyn empirically  So far group B strep AND actinomyces odontolyticus from superficial cultures  Have discontinued vanco 1/14, continue zosyn for now     Need to see if patient has gotten tetanus booster - if not needs tdap

## 2017-01-18 ENCOUNTER — ANESTHESIA EVENT (OUTPATIENT)
Dept: SURGERY | Facility: HOSPITAL | Age: 47
DRG: 617 | End: 2017-01-18
Payer: MEDICAID

## 2017-01-18 LAB
ANION GAP SERPL CALC-SCNC: 7 MMOL/L
BACTERIA SPEC AEROBE CULT: NORMAL
BASOPHILS # BLD AUTO: 0.02 K/UL
BASOPHILS NFR BLD: 0.3 %
BUN SERPL-MCNC: 8 MG/DL
CALCIUM SERPL-MCNC: 9.1 MG/DL
CHLORIDE SERPL-SCNC: 103 MMOL/L
CO2 SERPL-SCNC: 31 MMOL/L
CREAT SERPL-MCNC: 1 MG/DL
DIFFERENTIAL METHOD: ABNORMAL
EOSINOPHIL # BLD AUTO: 0.1 K/UL
EOSINOPHIL NFR BLD: 1.1 %
ERYTHROCYTE [DISTWIDTH] IN BLOOD BY AUTOMATED COUNT: 13.3 %
EST. GFR  (AFRICAN AMERICAN): >60 ML/MIN/1.73 M^2
EST. GFR  (NON AFRICAN AMERICAN): >60 ML/MIN/1.73 M^2
GLUCOSE SERPL-MCNC: 72 MG/DL
HCT VFR BLD AUTO: 32.8 %
HGB BLD-MCNC: 10.3 G/DL
LYMPHOCYTES # BLD AUTO: 1.7 K/UL
LYMPHOCYTES NFR BLD: 25.9 %
MCH RBC QN AUTO: 28.1 PG
MCHC RBC AUTO-ENTMCNC: 31.4 %
MCV RBC AUTO: 90 FL
MONOCYTES # BLD AUTO: 0.7 K/UL
MONOCYTES NFR BLD: 11.1 %
NEUTROPHILS # BLD AUTO: 4 K/UL
NEUTROPHILS NFR BLD: 61.4 %
PLATELET # BLD AUTO: 260 K/UL
PMV BLD AUTO: 9.7 FL
POCT GLUCOSE: 183 MG/DL (ref 70–110)
POCT GLUCOSE: 202 MG/DL (ref 70–110)
POCT GLUCOSE: 215 MG/DL (ref 70–110)
POCT GLUCOSE: 71 MG/DL (ref 70–110)
POTASSIUM SERPL-SCNC: 3.6 MMOL/L
RBC # BLD AUTO: 3.66 M/UL
SODIUM SERPL-SCNC: 141 MMOL/L
WBC # BLD AUTO: 6.46 K/UL

## 2017-01-18 PROCEDURE — 85025 COMPLETE CBC W/AUTO DIFF WBC: CPT

## 2017-01-18 PROCEDURE — 36415 COLL VENOUS BLD VENIPUNCTURE: CPT

## 2017-01-18 PROCEDURE — 25000003 PHARM REV CODE 250: Performed by: NURSE PRACTITIONER

## 2017-01-18 PROCEDURE — 11000001 HC ACUTE MED/SURG PRIVATE ROOM

## 2017-01-18 PROCEDURE — 63600175 PHARM REV CODE 636 W HCPCS: Performed by: NURSE PRACTITIONER

## 2017-01-18 PROCEDURE — 80048 BASIC METABOLIC PNL TOTAL CA: CPT

## 2017-01-18 RX ADMIN — PIPERACILLIN SODIUM AND TAZOBACTAM SODIUM 4.5 G: 4; .5 INJECTION, POWDER, LYOPHILIZED, FOR SOLUTION INTRAVENOUS at 05:01

## 2017-01-18 RX ADMIN — INSULIN ASPART 8 UNITS: 100 INJECTION, SOLUTION INTRAVENOUS; SUBCUTANEOUS at 11:01

## 2017-01-18 RX ADMIN — PIPERACILLIN SODIUM AND TAZOBACTAM SODIUM 4.5 G: 4; .5 INJECTION, POWDER, LYOPHILIZED, FOR SOLUTION INTRAVENOUS at 09:01

## 2017-01-18 RX ADMIN — INSULIN DETEMIR 15 UNITS: 100 INJECTION, SOLUTION SUBCUTANEOUS at 08:01

## 2017-01-18 RX ADMIN — INSULIN ASPART 1 UNITS: 100 INJECTION, SOLUTION INTRAVENOUS; SUBCUTANEOUS at 10:01

## 2017-01-18 RX ADMIN — MICONAZOLE NITRATE: 20 CREAM TOPICAL at 08:01

## 2017-01-18 RX ADMIN — MICONAZOLE NITRATE: 20 CREAM TOPICAL at 09:01

## 2017-01-18 RX ADMIN — INSULIN ASPART 8 UNITS: 100 INJECTION, SOLUTION INTRAVENOUS; SUBCUTANEOUS at 05:01

## 2017-01-18 RX ADMIN — INSULIN ASPART 4 UNITS: 100 INJECTION, SOLUTION INTRAVENOUS; SUBCUTANEOUS at 05:01

## 2017-01-18 RX ADMIN — PIPERACILLIN SODIUM AND TAZOBACTAM SODIUM 4.5 G: 4; .5 INJECTION, POWDER, LYOPHILIZED, FOR SOLUTION INTRAVENOUS at 12:01

## 2017-01-18 RX ADMIN — INSULIN ASPART 4 UNITS: 100 INJECTION, SOLUTION INTRAVENOUS; SUBCUTANEOUS at 11:01

## 2017-01-18 RX ADMIN — ENOXAPARIN SODIUM 40 MG: 100 INJECTION SUBCUTANEOUS at 11:01

## 2017-01-18 NOTE — PLAN OF CARE
Per admitting, patient has insurance but does not go into affect until 2/1/17. Patient to go to OR tomorrow for toe amputation.       01/18/17 1246   Discharge Reassessment   Assessment Type Discharge Planning Reassessment   Can the patient answer the patient profile reliably? Yes, cognitively intact   How does the patient rate their overall health at the present time? Good   Describe the patient's ability to walk at the present time. Minor restrictions or changes   During the past month, has the patient often been bothered by feeling down, depressed or hopeless? Yes   During the past month, has the patient often been bothered by little interest or pleasure in doing things? No   Discharge plan remains the same: No   Provided patient/caregiver education on the expected discharge date and the discharge plan No   Discharge Plan A Home   Discharge Plan B Home   Change in patient condition or support system No   Patient choice form signed by patient/caregiver N/A   Explained to the the patient/caregiver why the discharge planned changed: Yes   Involved the patient/caregiver in establishing a new discharge plan: Yes     Geetha Moraes RN, CCM, CMSRN  RN Transition Navigator  [phone]

## 2017-01-18 NOTE — ANESTHESIA PREPROCEDURE EVALUATION
01/18/2017  Indio Ryder Jr. is a 46 y.o., male with PMH poorly controlled DM2, HTN here for amputation of toe and tendon lengthening, right un regional/mac.   There were no vitals filed for this visit.    Review of patient's allergies indicates:  No Known Allergies    Past Medical History   Diagnosis Date    Diabetes mellitus type II     Diabetic foot ulcer     Hyperlipidemia     Hypertension     Peripheral neuropathy     Ulcerative colitis, unspecified      Past Surgical History   Procedure Laterality Date    Toe amputation Right 06/05/2015       Patient Active Problem List   Diagnosis    Essential hypertension    Shoulder impingement    Sacroiliac pain    Anemia    Hyperlipidemia    Noncompliance with medication regimen    Diabetic ulcer of right foot associated with type 2 diabetes mellitus    Traumatic amputation of fifth toe of right foot    Type 2 diabetes mellitus with diabetic neuropathy, with long-term current use of insulin    Bilateral leg edema, very severe on right    Tinea pedis of left foot    Subacute osteomyelitis of right foot    Group B streptococcal infection    Osteomyelitis of right foot    Actinomyces infection     Lab Results   Component Value Date    WBC 6.46 01/18/2017    HGB 10.3 (L) 01/18/2017    HCT 32.8 (L) 01/18/2017     01/18/2017    CHOL 141 01/12/2017    TRIG 84 01/12/2017    HDL 43 01/12/2017    ALT 7 (L) 01/12/2017    AST 12 01/12/2017     01/18/2017    K 3.6 01/18/2017     01/18/2017    CREATININE 1.0 01/18/2017    BUN 8 01/18/2017    CO2 31 (H) 01/18/2017    TSH 1.136 03/01/2012    PSA 0.65 08/11/2014    INR 1.1 01/16/2017    HGBA1C 15.0 (H) 01/12/2017     [facility] Anesthesia Evaluation    I have reviewed the Patient Summary Reports.    I have reviewed the Nursing Notes.   I have reviewed the Medications.     Review of  Systems  Anesthesia Hx:  No problems with previous Anesthesia  Denies Family Hx of Anesthesia complications.   Denies Personal Hx of Anesthesia complications.   Social:  Non-Smoker, No Alcohol Use    Cardiovascular:   Exercise tolerance: good Hypertension ECG has been reviewed. EKG 5/2016: Normal sinus rhythm  Normal ECG    11/2015: Negative exercise stress test     Neurological:   Peripheral Neuropathy    Endocrine:   Diabetes, poorly controlled, type 2, using insulin        Physical Exam  General:  Well nourished    Airway/Jaw/Neck:  Airway Findings: Mouth Opening: Normal Tongue: Normal  General Airway Assessment: Adult  Mallampati: III  Improves to II with phonation.  TM Distance: Normal, at least 6 cm  Jaw/Neck Findings:  Neck ROM: Normal ROM      Dental:  Dental Findings: In tact   Chest/Lungs:  Chest/Lungs Findings: Clear to auscultation, Normal Respiratory Rate     Heart/Vascular:  Heart Findings: Rate: Normal  Rhythm: Regular Rhythm  Sounds: Normal        Mental Status:  Mental Status Findings:  Cooperative, Alert and Oriented         Anesthesia Plan  Type of Anesthesia, risks & benefits discussed:  Anesthesia Type:  general, MAC, regional  Patient's Preference:   Intra-op Monitoring Plan:   Intra-op Monitoring Plan Comments:   Post Op Pain Control Plan:   Post Op Pain Control Plan Comments:   Induction:   IV  Beta Blocker:  Patient is not currently on a Beta-Blocker (No further documentation required).       Informed Consent: Patient understands risks and agrees with Anesthesia plan.  Questions answered. Anesthesia consent signed with patient.  ASA Score: 3     Day of Surgery Review of History & Physical: I have interviewed and examined the patient. I have reviewed the patient's H&P dated:  There are no significant changes.  H&P update referred to the surgeon.         Ready For Surgery From Anesthesia Perspective.

## 2017-01-18 NOTE — PLAN OF CARE
Problem: Patient Care Overview  Goal: Plan of Care Review  Outcome: Ongoing (interventions implemented as appropriate)  Reviewed plan of care with patient. Patient verbalized understanding. S/p I&D to right foot. Seen by podiatry today; dressing changed by podiatry to right foot. Dressing remains CDI. Pt denies any pain at this time. No distress noted. Will report to oncoming nurse and will continue to monitor.

## 2017-01-18 NOTE — PLAN OF CARE
Problem: Skin and Soft Tissue Infection (Adult)  Goal: Signs and Symptoms of Listed Potential Problems Will be Absent, Minimized or Managed (Skin and Soft Tissue Infection)  Signs and symptoms of listed potential problems will be absent, minimized or managed by discharge/transition of care (reference Skin and Soft Tissue Infection (Adult) CPG).   Outcome: Ongoing (interventions implemented as appropriate)  Pt BLE assessed. Left foot washed, dried and antifungal applied. Iv antibiotics given. Feet elevated off bed with pillow

## 2017-01-19 ENCOUNTER — ANESTHESIA (OUTPATIENT)
Dept: SURGERY | Facility: HOSPITAL | Age: 47
DRG: 617 | End: 2017-01-19
Payer: MEDICAID

## 2017-01-19 ENCOUNTER — SURGERY (OUTPATIENT)
Age: 47
End: 2017-01-19

## 2017-01-19 PROBLEM — M86.271 SUBACUTE OSTEOMYELITIS OF RIGHT FOOT: Status: ACTIVE | Noted: 2017-01-12

## 2017-01-19 LAB
GRAM STN SPEC: NORMAL
GRAM STN SPEC: NORMAL
POCT GLUCOSE: 104 MG/DL (ref 70–110)
POCT GLUCOSE: 134 MG/DL (ref 70–110)
POCT GLUCOSE: 228 MG/DL (ref 70–110)
POCT GLUCOSE: 67 MG/DL (ref 70–110)

## 2017-01-19 PROCEDURE — 37000008 HC ANESTHESIA 1ST 15 MINUTES: Performed by: PODIATRIST

## 2017-01-19 PROCEDURE — 87070 CULTURE OTHR SPECIMN AEROBIC: CPT

## 2017-01-19 PROCEDURE — 27605 INCISION OF ACHILLES TENDON: CPT | Mod: 58,51,RT, | Performed by: PODIATRIST

## 2017-01-19 PROCEDURE — 88305 TISSUE EXAM BY PATHOLOGIST: CPT | Mod: 26,,, | Performed by: PATHOLOGY

## 2017-01-19 PROCEDURE — 63600175 PHARM REV CODE 636 W HCPCS: Performed by: NURSE ANESTHETIST, CERTIFIED REGISTERED

## 2017-01-19 PROCEDURE — 88311 DECALCIFY TISSUE: CPT | Mod: 26,,, | Performed by: PATHOLOGY

## 2017-01-19 PROCEDURE — 87075 CULTR BACTERIA EXCEPT BLOOD: CPT

## 2017-01-19 PROCEDURE — 88305 TISSUE EXAM BY PATHOLOGIST: CPT | Performed by: PATHOLOGY

## 2017-01-19 PROCEDURE — 87116 MYCOBACTERIA CULTURE: CPT

## 2017-01-19 PROCEDURE — 87205 SMEAR GRAM STAIN: CPT

## 2017-01-19 PROCEDURE — 94761 N-INVAS EAR/PLS OXIMETRY MLT: CPT

## 2017-01-19 PROCEDURE — 36000707: Performed by: PODIATRIST

## 2017-01-19 PROCEDURE — 87102 FUNGUS ISOLATION CULTURE: CPT

## 2017-01-19 PROCEDURE — 63600175 PHARM REV CODE 636 W HCPCS: Performed by: NURSE PRACTITIONER

## 2017-01-19 PROCEDURE — 25000003 PHARM REV CODE 250: Performed by: NURSE PRACTITIONER

## 2017-01-19 PROCEDURE — 11000001 HC ACUTE MED/SURG PRIVATE ROOM

## 2017-01-19 PROCEDURE — 28820 AMPUTATION OF TOE: CPT | Mod: 58,RT,, | Performed by: PODIATRIST

## 2017-01-19 PROCEDURE — 71000039 HC RECOVERY, EACH ADD'L HOUR: Performed by: PODIATRIST

## 2017-01-19 PROCEDURE — 27201423 OPTIME MED/SURG SUP & DEVICES STERILE SUPPLY: Performed by: PODIATRIST

## 2017-01-19 PROCEDURE — 37000009 HC ANESTHESIA EA ADD 15 MINS: Performed by: PODIATRIST

## 2017-01-19 PROCEDURE — 20220 BONE BIOPSY TROCAR/NDL SUPFC: CPT | Mod: 58,59,, | Performed by: PODIATRIST

## 2017-01-19 PROCEDURE — 25000003 PHARM REV CODE 250: Performed by: NURSE ANESTHETIST, CERTIFIED REGISTERED

## 2017-01-19 PROCEDURE — 71000033 HC RECOVERY, INTIAL HOUR: Performed by: PODIATRIST

## 2017-01-19 PROCEDURE — 36000706: Performed by: PODIATRIST

## 2017-01-19 PROCEDURE — 25000003 PHARM REV CODE 250: Performed by: PODIATRIST

## 2017-01-19 RX ORDER — SODIUM CHLORIDE 9 MG/ML
INJECTION, SOLUTION INTRAVENOUS CONTINUOUS PRN
Status: DISCONTINUED | OUTPATIENT
Start: 2017-01-19 | End: 2017-01-19

## 2017-01-19 RX ORDER — HYDROMORPHONE HYDROCHLORIDE 2 MG/ML
0.2 INJECTION, SOLUTION INTRAMUSCULAR; INTRAVENOUS; SUBCUTANEOUS EVERY 5 MIN PRN
Status: DISCONTINUED | OUTPATIENT
Start: 2017-01-19 | End: 2017-01-19

## 2017-01-19 RX ORDER — PHENYLEPHRINE HYDROCHLORIDE 10 MG/ML
INJECTION INTRAVENOUS
Status: DISCONTINUED | OUTPATIENT
Start: 2017-01-19 | End: 2017-01-19

## 2017-01-19 RX ORDER — MIDAZOLAM HYDROCHLORIDE 1 MG/ML
INJECTION, SOLUTION INTRAMUSCULAR; INTRAVENOUS
Status: DISCONTINUED | OUTPATIENT
Start: 2017-01-19 | End: 2017-01-19

## 2017-01-19 RX ORDER — FENTANYL CITRATE 50 UG/ML
INJECTION, SOLUTION INTRAMUSCULAR; INTRAVENOUS
Status: DISCONTINUED | OUTPATIENT
Start: 2017-01-19 | End: 2017-01-19

## 2017-01-19 RX ORDER — MEPERIDINE HYDROCHLORIDE 50 MG/ML
12.5 INJECTION INTRAMUSCULAR; INTRAVENOUS; SUBCUTANEOUS ONCE AS NEEDED
Status: DISCONTINUED | OUTPATIENT
Start: 2017-01-19 | End: 2017-01-19

## 2017-01-19 RX ORDER — OXYCODONE HYDROCHLORIDE 5 MG/1
5 TABLET ORAL
Status: DISCONTINUED | OUTPATIENT
Start: 2017-01-19 | End: 2017-01-19

## 2017-01-19 RX ORDER — SODIUM CHLORIDE 0.9 % (FLUSH) 0.9 %
3 SYRINGE (ML) INJECTION EVERY 8 HOURS
Status: DISCONTINUED | OUTPATIENT
Start: 2017-01-19 | End: 2017-01-19

## 2017-01-19 RX ORDER — PROPOFOL 10 MG/ML
VIAL (ML) INTRAVENOUS CONTINUOUS PRN
Status: DISCONTINUED | OUTPATIENT
Start: 2017-01-19 | End: 2017-01-19

## 2017-01-19 RX ORDER — LIDOCAINE HCL/PF 100 MG/5ML
SYRINGE (ML) INTRAVENOUS
Status: DISCONTINUED | OUTPATIENT
Start: 2017-01-19 | End: 2017-01-19

## 2017-01-19 RX ORDER — HYDROMORPHONE HYDROCHLORIDE 2 MG/ML
0.5 INJECTION, SOLUTION INTRAMUSCULAR; INTRAVENOUS; SUBCUTANEOUS EVERY 5 MIN PRN
Status: DISCONTINUED | OUTPATIENT
Start: 2017-01-19 | End: 2017-01-19

## 2017-01-19 RX ORDER — BUPIVACAINE HYDROCHLORIDE 5 MG/ML
INJECTION, SOLUTION EPIDURAL; INTRACAUDAL
Status: DISCONTINUED | OUTPATIENT
Start: 2017-01-19 | End: 2017-01-19 | Stop reason: HOSPADM

## 2017-01-19 RX ORDER — SODIUM CHLORIDE 0.9 % (FLUSH) 0.9 %
3 SYRINGE (ML) INJECTION
Status: DISCONTINUED | OUTPATIENT
Start: 2017-01-19 | End: 2017-01-23 | Stop reason: HOSPADM

## 2017-01-19 RX ADMIN — PHENYLEPHRINE HYDROCHLORIDE 100 MCG: 10 INJECTION INTRAVENOUS at 12:01

## 2017-01-19 RX ADMIN — PHENYLEPHRINE HYDROCHLORIDE 100 MCG: 10 INJECTION INTRAVENOUS at 01:01

## 2017-01-19 RX ADMIN — INSULIN ASPART 2 UNITS: 100 INJECTION, SOLUTION INTRAVENOUS; SUBCUTANEOUS at 09:01

## 2017-01-19 RX ADMIN — PIPERACILLIN SODIUM AND TAZOBACTAM SODIUM 4.5 G: 4; .5 INJECTION, POWDER, LYOPHILIZED, FOR SOLUTION INTRAVENOUS at 05:01

## 2017-01-19 RX ADMIN — FENTANYL CITRATE 25 MCG: 50 INJECTION, SOLUTION INTRAMUSCULAR; INTRAVENOUS at 12:01

## 2017-01-19 RX ADMIN — BUPIVACAINE HYDROCHLORIDE 10 ML: 5 INJECTION, SOLUTION EPIDURAL; INTRACAUDAL; PERINEURAL at 12:01

## 2017-01-19 RX ADMIN — DEXTROSE MONOHYDRATE 12.5 G: 25 INJECTION, SOLUTION INTRAVENOUS at 07:01

## 2017-01-19 RX ADMIN — PIPERACILLIN SODIUM AND TAZOBACTAM SODIUM 4.5 G: 4; .5 INJECTION, POWDER, LYOPHILIZED, FOR SOLUTION INTRAVENOUS at 02:01

## 2017-01-19 RX ADMIN — PROPOFOL 150 MCG/KG/MIN: 10 INJECTION, EMULSION INTRAVENOUS at 12:01

## 2017-01-19 RX ADMIN — MIDAZOLAM 3 MG: 1 INJECTION INTRAMUSCULAR; INTRAVENOUS at 12:01

## 2017-01-19 RX ADMIN — LIDOCAINE HYDROCHLORIDE 50 MG: 20 INJECTION, SOLUTION INTRAVENOUS at 12:01

## 2017-01-19 RX ADMIN — MICONAZOLE NITRATE: 20 CREAM TOPICAL at 08:01

## 2017-01-19 RX ADMIN — PIPERACILLIN SODIUM AND TAZOBACTAM SODIUM 4.5 G: 4; .5 INJECTION, POWDER, LYOPHILIZED, FOR SOLUTION INTRAVENOUS at 09:01

## 2017-01-19 RX ADMIN — MICONAZOLE NITRATE: 20 CREAM TOPICAL at 09:01

## 2017-01-19 RX ADMIN — SODIUM CHLORIDE: 0.9 INJECTION, SOLUTION INTRAVENOUS at 12:01

## 2017-01-19 RX ADMIN — INSULIN ASPART 8 UNITS: 100 INJECTION, SOLUTION INTRAVENOUS; SUBCUTANEOUS at 05:01

## 2017-01-19 NOTE — PROGRESS NOTES
[facility]  Infectious Disease  Progress Note    Patient Name: Indio Ryder Jr.  MRN: [id number]  Admission Date: 1/12/2017  Length of Stay: 6 days  Attending Physician: Bharathi Terrazas MD  Primary Care Provider: Lorena Thakur MD    Isolation Status: No active isolations  Assessment/Plan:      * Subacute osteomyelitis of right foot  45 y/o male with DM II and right foot ulcerations - history of longstanding ulcerations of this foot over 3 years - do not have old records. Patient had been on cipro and flagyl for about 6 months up until October 2016. He just restarted them PTA - had a few left at home.      Patient placed on vanco and zosyn empirically  So far group B strep AND actinomyces odontolyticus, and skin janes from superficial cultures 1/12 and on 1/16 surgical cultures - ACTINOMYCES ODONTOLYTICUS and skin janes    Have discontinued vanco 1/14, continue zosyn for now  Await surgical cultures from 1/19          Group B streptococcal infection  Right foot positive for GBS - superficial cultures (also positive for actinomyces)    Actinomyces infection  Right foot positive for actinomyces odontolyticus (also Group B strep)    Diabetic ulcer of right foot associated with type 2 diabetes mellitus  Management of DM per primary    Tinea pedis of left foot  Topical antifungal prescribed      Anticipated Disposition: await surgery 1/19 - will follow cultures done at that time    Thank you for your consult. I will follow-up with patient. Please contact us if you have any additional questions.[phone]    Subjective:     Principal Problem:Subacute osteomyelitis of right foot    Interval History: No acute events over night. Podiatry plans TMA on 1/19. Per primary note patient recalls tetanus booster within 10 years.     HPI:  45 y/o male with HTN, DM II, with peripheral neuropathy. Patient has had diabetic ulcer of right foot followed by podiatry as outpatient (Dr. William Manuel) up until  October when patient lost insurance benefits. Patient was doing own wound care a few times a week. He noticed malodorous drainage and swelling and erythema in December prior to ann. Patient regained insurance Jan 1, 2017 and went to podiatry on 1/10 and he was instructed to come to the ED. Patient presented to ED 1/12/17 - x ray of right foot concerning for osteo of 1st and 5th MT and cellulitis. Patient was given 1L NS and vanco and ceftriaxone in ED and admitted to hospital medicine. ED reports patient has history of 3 years of ongoing ulcer to right foot. MRI of foot ordered. Podiatry consulted. Patient placed on vanco and zosyn on admit.     Patient stated he had been on po cipro and flagyl for about 6 months until October. He had a few antibiotic pills left and he took them recently when he started to have drainage from right foot PTA. Patient does not recall last tetanus shot.     1/12 right foot culture - positive for group B strep  1/14 vancomycin discontinued, zosyn continued  1/15 Podiatry performed I + D right great toe  Planned TMA on 1/19              Review of Systems   Respiratory: Negative for shortness of breath.    Gastrointestinal: Negative for diarrhea, nausea and vomiting.     Antibiotics     Start     Stop Route Frequency Ordered    01/12/17 1300  piperacillin-tazobactam 4.5 g in dextrose 5 % 100 mL IVPB (ready to mix system)      -- IV Every 8 hours (non-standard times) 01/12/17 1239        Antifungals     Start     Stop Route Frequency Ordered    01/12/17 1500  miconazole 2 % cream      -- Top 2 times daily 01/12/17 1459        Objective:     Vital Signs (Most Recent):  Temp: 98.2 °F (36.8 °C) (01/18/17 1600)  Pulse: 92 (01/18/17 1600)  Resp: 18 (01/18/17 1600)  BP: (!) 141/84 (01/18/17 1600)  SpO2: 98 % (01/16/17 1256) Vital Signs (24h Range):  Temp:  [98.2 °F (36.8 °C)-98.7 °F (37.1 °C)] 98.2 °F (36.8 °C)  Pulse:  [80-92] 92  Resp:  [18-20] 18  BP: (111-162)/(67-91) 141/84     Weight:  93 kg (205 lb)  Body mass index is 27.05 kg/(m^2).    Estimated Creatinine Clearance: 102.1 mL/min (based on Cr of 1).    Physical Exam   Cardiovascular: Regular rhythm and normal heart sounds.    No murmur heard.  Pulmonary/Chest: Breath sounds normal. No respiratory distress.   Abdominal: Bowel sounds are normal. He exhibits no distension. There is no tenderness.   Musculoskeletal: He exhibits no edema.   Right foot bandaged, left foot with tinea pedis - healing       Significant Labs:   1/16 tissue toe right foot 1st ray - GPC, GNR, GPR - skin janes no predominance, and ACTINOMYCES ODONTOLYTICUS    1/12 abscess right foot - ACTINOMYCES ODONTOLYTICUS. Skin janes no predominant org, and Group B strep      Blood Culture:   Recent Labs  Lab 01/12/17  0230 01/12/17  0342   LABBLOO No growth after 5 days. No growth after 5 days.       CBC:   Recent Labs  Lab 01/18/17  0445   WBC 6.46   HGB 10.3*   HCT 32.8*        CMP:   Recent Labs  Lab 01/18/17  0445      K 3.6      CO2 31*   GLU 72   BUN 8   CREATININE 1.0   CALCIUM 9.1   ANIONGAP 7*   EGFRNONAA >60     Urine Studies:   Recent Labs  Lab 01/12/17  1341   COLORU Yellow   APPEARANCEUA Clear   PHUR 6.0   SPECGRAV 1.025   PROTEINUA Negative   GLUCUA 3+*   KETONESU 1+*   BILIRUBINUA Negative   OCCULTUA Negative   NITRITE Negative   UROBILINOGEN Negative   LEUKOCYTESUR Negative   BACTERIA None     Lab Results   Component Value Date    SEDRATE 113 (H) 01/16/2017     Lab Results   Component Value Date    CRP 5.1 01/16/2017       Significant Imaging: no new images since 1/16        Rola Mckinney MD  Infectious Disease  [facility]

## 2017-01-19 NOTE — ANESTHESIA RELEASE NOTE
"Anesthesia Release from PACU Note    Patient: Indio Ryder JrMarshall    Procedure(s) Performed: Procedure(s) (LRB):  AMPUTATION-TOE and Tendo achilles lengthening (Right)    Anesthesia type: MAC    Post pain: Adequate analgesia    Post assessment: no apparent anesthetic complications and tolerated procedure well    Last Vitals:   Visit Vitals    BP (!) 153/93    Pulse 72    Temp 36.7 °C (98 °F) (Oral)    Resp 11    Ht 6' 1" (1.854 m)    Wt 93 kg (205 lb)    SpO2 99%    BMI 27.05 kg/m2       Post vital signs: stable    Level of consciousness: awake, alert  and oriented    Nausea/Vomiting: no nausea/no vomiting    Complications: none    Airway Patency: patent    Respiratory: unassisted, spontaneous ventilation, room air    Cardiovascular: stable and blood pressure at baseline    Hydration: euvolemic  "

## 2017-01-19 NOTE — PLAN OF CARE
Problem: Health Knowledge, Opportunity to Enhance (Adult,NICU,[place],Obstetrics,Pediatric)  Goal: Identify Related Risk Factors and Signs and Symptoms  Related risk factors and signs and symptoms are identified upon initiation of Human Response Clinical Practice Guideline (CPG)   Outcome: Ongoing (interventions implemented as appropriate)  Alteration in skin integrity due to diabetic ulceration of great toe. Patient in bed foot elevated on pillow at this time. Patient denies any pain or discomfort to rt great toe. Patient receiving antibiotics at this time. Patient continues to remain stable, patient for surgery in am. Patient instructed on signs and symptoms of post-op care. Will continue to observe.

## 2017-01-19 NOTE — SUBJECTIVE & OBJECTIVE
Interval History: Doing well today. No pain in the foot. Tolerating the diet.     Review of Systems   Constitutional: Negative for chills and fever.   Respiratory: Negative for cough and shortness of breath.    Gastrointestinal: Negative for constipation.     Objective:     Vital Signs (Most Recent):  Temp: 98.5 °F (36.9 °C) (01/18/17 2009)  Pulse: 85 (01/18/17 2009)  Resp: 20 (01/18/17 2009)  BP: (!) 149/78 (01/18/17 2009)  SpO2: 98 % (01/16/17 1256) Vital Signs (24h Range):  Temp:  [98.2 °F (36.8 °C)-98.7 °F (37.1 °C)] 98.5 °F (36.9 °C)  Pulse:  [80-92] 85  Resp:  [18-20] 20  BP: (111-150)/(67-91) 149/78     Weight: 93 kg (205 lb)  Body mass index is 27.05 kg/(m^2).    Intake/Output Summary (Last 24 hours) at 01/18/17 2159  Last data filed at 01/18/17 2000   Gross per 24 hour   Intake              600 ml   Output             1800 ml   Net            -1200 ml      Physical Exam   Constitutional: He is oriented to person, place, and time. He appears well-developed and well-nourished. No distress.   Musculoskeletal: He exhibits no edema.   Bandage in place on RLE with strikethrough present   Neurological: He is alert and oriented to person, place, and time.   Nursing note and vitals reviewed.      Significant Labs:   BMP:   Recent Labs  Lab 01/18/17  0445   GLU 72      K 3.6      CO2 31*   BUN 8   CREATININE 1.0   CALCIUM 9.1     CBC:   Recent Labs  Lab 01/18/17  0445   WBC 6.46   HGB 10.3*   HCT 32.8*          Significant Imaging: I have reviewed all pertinent imaging results/findings within the past 24 hours.

## 2017-01-19 NOTE — SUBJECTIVE & OBJECTIVE
Interval History: No acute events over night. Podiatry plans TMA on 1/19. Per primary note patient recalls tetanus booster within 10 years.     HPI:  45 y/o male with HTN, DM II, with peripheral neuropathy. Patient has had diabetic ulcer of right foot followed by podiatry as outpatient (Dr. William Manuel) up until October when patient lost insurance benefits. Patient was doing own wound care a few times a week. He noticed malodorous drainage and swelling and erythema in December prior to ann. Patient regained insurance Jan 1, 2017 and went to podiatry on 1/10 and he was instructed to come to the ED. Patient presented to ED 1/12/17 - x ray of right foot concerning for osteo of 1st and 5th MT and cellulitis. Patient was given 1L NS and vanco and ceftriaxone in ED and admitted to hospital medicine. ED reports patient has history of 3 years of ongoing ulcer to right foot. MRI of foot ordered. Podiatry consulted. Patient placed on vanco and zosyn on admit.     Patient stated he had been on po cipro and flagyl for about 6 months until October. He had a few antibiotic pills left and he took them recently when he started to have drainage from right foot PTA. Patient does not recall last tetanus shot.     1/12 right foot culture - positive for group B strep  1/14 vancomycin discontinued, zosyn continued  1/15 Podiatry performed I + D right great toe  Planned TMA on 1/19              Review of Systems   Respiratory: Negative for shortness of breath.    Gastrointestinal: Negative for diarrhea, nausea and vomiting.     Antibiotics     Start     Stop Route Frequency Ordered    01/12/17 1300  piperacillin-tazobactam 4.5 g in dextrose 5 % 100 mL IVPB (ready to mix system)      -- IV Every 8 hours (non-standard times) 01/12/17 1239        Antifungals     Start     Stop Route Frequency Ordered    01/12/17 1500  miconazole 2 % cream      -- Top 2 times daily 01/12/17 7728        Objective:     Vital Signs (Most Recent):  Temp:  98.2 °F (36.8 °C) (01/18/17 1600)  Pulse: 92 (01/18/17 1600)  Resp: 18 (01/18/17 1600)  BP: (!) 141/84 (01/18/17 1600)  SpO2: 98 % (01/16/17 1256) Vital Signs (24h Range):  Temp:  [98.2 °F (36.8 °C)-98.7 °F (37.1 °C)] 98.2 °F (36.8 °C)  Pulse:  [80-92] 92  Resp:  [18-20] 18  BP: (111-162)/(67-91) 141/84     Weight: 93 kg (205 lb)  Body mass index is 27.05 kg/(m^2).    Estimated Creatinine Clearance: 102.1 mL/min (based on Cr of 1).    Physical Exam   Cardiovascular: Regular rhythm and normal heart sounds.    No murmur heard.  Pulmonary/Chest: Breath sounds normal. No respiratory distress.   Abdominal: Bowel sounds are normal. He exhibits no distension. There is no tenderness.   Musculoskeletal: He exhibits no edema.   Right foot bandaged, left foot with tinea pedis - healing       Significant Labs:   1/16 tissue toe right foot 1st ray - GPC, GNR, GPR - skin janes no predominance, and ACTINOMYCES ODONTOLYTICUS    1/12 abscess right foot - ACTINOMYCES ODONTOLYTICUS. Skin janes no predominant org, and Group B strep      Blood Culture:   Recent Labs  Lab 01/12/17  0230 01/12/17  0342   LABBLOO No growth after 5 days. No growth after 5 days.       CBC:   Recent Labs  Lab 01/18/17  0445   WBC 6.46   HGB 10.3*   HCT 32.8*        CMP:   Recent Labs  Lab 01/18/17  0445      K 3.6      CO2 31*   GLU 72   BUN 8   CREATININE 1.0   CALCIUM 9.1   ANIONGAP 7*   EGFRNONAA >60     Urine Studies:   Recent Labs  Lab 01/12/17  1341   COLORU Yellow   APPEARANCEUA Clear   PHUR 6.0   SPECGRAV 1.025   PROTEINUA Negative   GLUCUA 3+*   KETONESU 1+*   BILIRUBINUA Negative   OCCULTUA Negative   NITRITE Negative   UROBILINOGEN Negative   LEUKOCYTESUR Negative   BACTERIA None     Lab Results   Component Value Date    SEDRATE 113 (H) 01/16/2017     Lab Results   Component Value Date    CRP 5.1 01/16/2017       Significant Imaging: no new images since 1/16

## 2017-01-19 NOTE — PROGRESS NOTES
Patient to OR today for R. Great toe amputation.    Geetha Moraes, RN, CCM, CMSRN  RN Transition Navigator  [phone]

## 2017-01-19 NOTE — ASSESSMENT & PLAN NOTE
Takes metformin 500 mg BID and insulin glargine 25 units HS at home. Hemoglobin A1c 15%. BS range 71 to 182; continue insulin detemir 15 units am 25 units HS, insulin aspart 8 units TID with meals, moderate dose insulin sliding scale.

## 2017-01-19 NOTE — ASSESSMENT & PLAN NOTE
Diabetic ulcer of right foot associated with type 2 diabetes mellitus/GBS infection    S/p resection of the first metatarsal head and base of the proximal phalanx, and I&D of the hallux at the IPJ level, with partial closure with retention sutures and iodoform packing. Podiatry planning to return to OR tomorrow AM. .  Continue piperacillin-tazobactam empirically. Streptococcus agalactiae grew in bedside wound culture. Appreciate Infectious Disease and Podiatry.

## 2017-01-19 NOTE — PROGRESS NOTES
"[facility]  Progress Note    Patient Name: Indio Ryder Jr.  MRN: [id number]  Patient Class: IP- Inpatient   Admission Date: 1/12/2017  Length of Stay: 6 days  Attending Physician: Bharathi Terrazas MD  Primary Care Provider: Lorena Thakur MD        Subjective:     Principal Problem:Subacute osteomyelitis of right foot    HPI:  Indio Ryder Jr. Is a 46 y.o.  man with hypertension and diabetes mellitus type 2 with peripheral neuropathy, s/p 5th toe ray amputation on 6/05/15. He lives in [place], [place]. He is single. He works in [employer] at [employer]. His primary care physician is Dr. Lorena Thakur. His podiatrist is Dr. William Manuel.    He lost his insurance and was unable to continue following up with Dr. Manuel in October 2016. He had taken ciprofloxacin and metronidazole for 6 months up until then. He was performing wound care himself at home "a couple of times per week". He noticed malodorous drainage, redness and swelling of his right foot prior to Marc, accompanied by fever, chills, and fatigue. He regained insurance beginning January 1, 2017.  Dr. Manuel saw him in his clinic on 1/10/17 and instructed him to go to an ED for IV antibiotics and to have "Dr. Matthew take a look at it". Dr. Matthew is a general surgeon at [facility]. He went to [facility], where there are also podiatrists, on 1/12/17.    He was afebrile, but pulse was 110s. WBC count was 9520 and hemoglobin and hematocrit were 11.8 and 37. ESR was 113, CRP was 19.6. He was hyperglycemic (glucose 421). X-ray of the right foot showed cellulitis and osteomyelitis of the right 1st and 5th metatarsals. He was given 1 liter normal saline, ceftriaxone, and vancomycin in the ED. He was admitted to [facility].       Hospital Course:  Vancomycin was continued and ceftriaxone changed to piperacillin-tazobactam. Podiatry was " consulted and performed bedside debridement and culture. He reported that he had been hyperglycemic recently due to the infection. Hemoglobin A1c was 15%. MRI showed osteomyelitis of most of the 1st metatarsal and the phalanges. Streptococcus agalactiae grew in bedside wound culture. Scheduled insulin doses were increased much higher than home doses to treat hyperglycemia. On 1/16/17 Dr. Ramirez Wilkes performed resection of the first metatarsal head and base of the proximal phalanx, and I&D of the hallux at the IPJ level, with partial closure with retention sutures and iodoform packing, as part one of a staged procedure.     Interval History: Doing well today. No pain in the foot. Tolerating the diet.     Review of Systems   Constitutional: Negative for chills and fever.   Respiratory: Negative for cough and shortness of breath.    Gastrointestinal: Negative for constipation.     Objective:     Vital Signs (Most Recent):  Temp: 98.5 °F (36.9 °C) (01/18/17 2009)  Pulse: 85 (01/18/17 2009)  Resp: 20 (01/18/17 2009)  BP: (!) 149/78 (01/18/17 2009)  SpO2: 98 % (01/16/17 1256) Vital Signs (24h Range):  Temp:  [98.2 °F (36.8 °C)-98.7 °F (37.1 °C)] 98.5 °F (36.9 °C)  Pulse:  [80-92] 85  Resp:  [18-20] 20  BP: (111-150)/(67-91) 149/78     Weight: 93 kg (205 lb)  Body mass index is 27.05 kg/(m^2).    Intake/Output Summary (Last 24 hours) at 01/18/17 2159  Last data filed at 01/18/17 2000   Gross per 24 hour   Intake              600 ml   Output             1800 ml   Net            -1200 ml      Physical Exam   Constitutional: He is oriented to person, place, and time. He appears well-developed and well-nourished. No distress.   Musculoskeletal: He exhibits no edema.   Bandage in place on RLE with strikethrough present   Neurological: He is alert and oriented to person, place, and time.   Nursing note and vitals reviewed.      Significant Labs:   BMP:   Recent Labs  Lab 01/18/17  0445   GLU 72      K 3.6      CO2  31*   BUN 8   CREATININE 1.0   CALCIUM 9.1     CBC:   Recent Labs  Lab 01/18/17  0445   WBC 6.46   HGB 10.3*   HCT 32.8*          Significant Imaging: I have reviewed all pertinent imaging results/findings within the past 24 hours.    Assessment/Plan:      * Subacute osteomyelitis of right foot  Diabetic ulcer of right foot associated with type 2 diabetes mellitus/GBS infection    S/p resection of the first metatarsal head and base of the proximal phalanx, and I&D of the hallux at the IPJ level, with partial closure with retention sutures and iodoform packing. Podiatry planning to return to OR tomorrow AM. .  Continue piperacillin-tazobactam empirically. Streptococcus agalactiae grew in bedside wound culture. Appreciate Infectious Disease and Podiatry.       Essential hypertension  SBP range 111 to 162.  Not requiring medications at this time.      Diabetic ulcer of right foot associated with type 2 diabetes mellitus  S/p debridement and will get partial amputation tomorrow.  Appreciate podiatry assistance. Appreciate ID assistance with the antibiotics.       Type 2 diabetes mellitus with diabetic neuropathy, with long-term current use of insulin  Takes metformin 500 mg BID and insulin glargine 25 units HS at home. Hemoglobin A1c 15%. BS range 71 to 182; continue insulin detemir 15 units am 25 units HS, insulin aspart 8 units TID with meals, moderate dose insulin sliding scale.       VTE Risk Mitigation         Ordered     enoxaparin injection 40 mg  Daily     Route:  Subcutaneous        01/12/17 1239     Medium Risk of VTE  Once      01/12/17 1239          Bharathi Terrazas MD  Department of Hospital Medicine   [facility]

## 2017-01-19 NOTE — BRIEF OP NOTE
[facility]  Brief Operative Note    SUMMARY     Surgery Date: 1/19/2017     Surgeon(s) and Role:     * Ramirez Wilkes DPM - Primary     * Bill Muhammad DPM - Resident - Assisting    Pre-op Diagnosis:  Infection [B99.9]  Diabetic ulcer of right foot associated with type 2 diabetes mellitus [E11.621, L97.519]  Equinus contracture of right ankle [M24.571]    Post-op Diagnosis:  Post-Op Diagnosis Codes:     * Infection [B99.9]     * Diabetic ulcer of right foot associated with type 2 diabetes mellitus [E11.621, L97.519]     * Equinus contracture of right ankle [M24.571]    Procedure(s) (LRB):  AMPUTATION-TOE and Tendo achilles lengthening (Right)   Deep Bone Biopsy, 1st metatarsal,  Right    Anesthesia: Local/ MAC    Description of Procedure: Right great toe amputation with primary closure. Tendo achilles lengthening. Deep bone biopsy of remaining first metatarsal base.    Description of the findings of the procedure: As stated above.    Estimated Blood Loss: 20 mL         Specimens:   Specimen (12h ago through future)    Start     Ordered    01/19/17 1255  Specimen to Pathology - Surgery  Once     Comments:  2. Clean margin 1st metatarsal- perm    01/19/17 1257    01/19/17 1249  Specimen to Pathology - Surgery  Once     Comments:  Right big toe- perm    01/19/17 1253        Bill Muhammad DPM PGY-3  Pager [phone]

## 2017-01-19 NOTE — ASSESSMENT & PLAN NOTE
S/p debridement and will get partial amputation tomorrow.  Appreciate podiatry assistance. Appreciate ID assistance with the antibiotics.

## 2017-01-19 NOTE — ANESTHESIA POSTPROCEDURE EVALUATION
"Anesthesia Post Evaluation    Patient: Indio Ryder Jr.    Procedure(s) Performed: Procedure(s) (LRB):  AMPUTATION-TOE and Tendo achilles lengthening (Right)    Final Anesthesia Type: MAC  Patient location during evaluation: PACU  Patient participation: Yes- Able to Participate  Level of consciousness: awake and alert and oriented  Post-procedure vital signs: reviewed and stable  Pain management: adequate  Airway patency: patent  PONV status at discharge: No PONV  Anesthetic complications: no      Cardiovascular status: blood pressure returned to baseline and hemodynamically stable  Respiratory status: unassisted, spontaneous ventilation and room air  Hydration status: euvolemic  Follow-up not needed.        Visit Vitals    BP (!) 153/93    Pulse 72    Temp 36.7 °C (98 °F) (Oral)    Resp 11    Ht 6' 1" (1.854 m)    Wt 93 kg (205 lb)    SpO2 99%    BMI 27.05 kg/m2       Pain/Jenny Score: Pain Assessment Performed: Yes (1/19/2017  1:40 PM)  Presence of Pain: denies (1/19/2017  1:40 PM)  Pain Rating Prior to Med Admin: 0 (1/18/2017  8:09 PM)  Pain Rating Post Med Admin: 0 (1/18/2017  8:09 PM)  Modified Jenny Score: 20 (1/19/2017  1:40 PM)      "

## 2017-01-19 NOTE — ASSESSMENT & PLAN NOTE
47 y/o male with DM II and right foot ulcerations - history of longstanding ulcerations of this foot over 3 years - do not have old records. Patient had been on cipro and flagyl for about 6 months up until October 2016. He just restarted them PTA - had a few left at home.      Patient placed on vanco and zosyn empirically  So far group B strep AND actinomyces odontolyticus, and skin janes from superficial cultures 1/12 and on 1/16 surgical cultures - ACTINOMYCES ODONTOLYTICUS and skin janes    Have discontinued vanco 1/14, continue zosyn for now  Await surgical cultures from 1/19

## 2017-01-20 LAB
ANION GAP SERPL CALC-SCNC: 7 MMOL/L
BACTERIA SPEC ANAEROBE CULT: NORMAL
BASOPHILS # BLD AUTO: 0.02 K/UL
BASOPHILS NFR BLD: 0.3 %
BUN SERPL-MCNC: 9 MG/DL
CALCIUM SERPL-MCNC: 8.8 MG/DL
CHLORIDE SERPL-SCNC: 103 MMOL/L
CO2 SERPL-SCNC: 29 MMOL/L
CREAT SERPL-MCNC: 1 MG/DL
DIFFERENTIAL METHOD: ABNORMAL
EOSINOPHIL # BLD AUTO: 0.1 K/UL
EOSINOPHIL NFR BLD: 0.9 %
ERYTHROCYTE [DISTWIDTH] IN BLOOD BY AUTOMATED COUNT: 13.5 %
EST. GFR  (AFRICAN AMERICAN): >60 ML/MIN/1.73 M^2
EST. GFR  (NON AFRICAN AMERICAN): >60 ML/MIN/1.73 M^2
GLUCOSE SERPL-MCNC: 245 MG/DL
HCT VFR BLD AUTO: 32.3 %
HGB BLD-MCNC: 10.1 G/DL
LYMPHOCYTES # BLD AUTO: 1.5 K/UL
LYMPHOCYTES NFR BLD: 22.6 %
MAGNESIUM SERPL-MCNC: 1.6 MG/DL
MCH RBC QN AUTO: 28 PG
MCHC RBC AUTO-ENTMCNC: 31.3 %
MCV RBC AUTO: 90 FL
MONOCYTES # BLD AUTO: 0.7 K/UL
MONOCYTES NFR BLD: 9.7 %
NEUTROPHILS # BLD AUTO: 4.4 K/UL
NEUTROPHILS NFR BLD: 66.4 %
PHOSPHATE SERPL-MCNC: 3.2 MG/DL
PLATELET # BLD AUTO: 309 K/UL
PMV BLD AUTO: 9.6 FL
POCT GLUCOSE: 205 MG/DL (ref 70–110)
POCT GLUCOSE: 242 MG/DL (ref 70–110)
POCT GLUCOSE: 353 MG/DL (ref 70–110)
POCT GLUCOSE: 90 MG/DL (ref 70–110)
POTASSIUM SERPL-SCNC: 4 MMOL/L
RBC # BLD AUTO: 3.61 M/UL
SODIUM SERPL-SCNC: 139 MMOL/L
WBC # BLD AUTO: 6.68 K/UL

## 2017-01-20 PROCEDURE — 84100 ASSAY OF PHOSPHORUS: CPT

## 2017-01-20 PROCEDURE — G8987 SELF CARE CURRENT STATUS: HCPCS | Mod: CJ | Performed by: SPECIALIST

## 2017-01-20 PROCEDURE — 97530 THERAPEUTIC ACTIVITIES: CPT

## 2017-01-20 PROCEDURE — 63600175 PHARM REV CODE 636 W HCPCS: Performed by: NURSE PRACTITIONER

## 2017-01-20 PROCEDURE — 97116 GAIT TRAINING THERAPY: CPT

## 2017-01-20 PROCEDURE — 80048 BASIC METABOLIC PNL TOTAL CA: CPT

## 2017-01-20 PROCEDURE — 0QBN0ZX EXCISION OF RIGHT METATARSAL, OPEN APPROACH, DIAGNOSTIC: ICD-10-PCS | Performed by: PODIATRIST

## 2017-01-20 PROCEDURE — 25000003 PHARM REV CODE 250: Performed by: NURSE PRACTITIONER

## 2017-01-20 PROCEDURE — 36415 COLL VENOUS BLD VENIPUNCTURE: CPT

## 2017-01-20 PROCEDURE — G8988 SELF CARE GOAL STATUS: HCPCS | Mod: CI | Performed by: SPECIALIST

## 2017-01-20 PROCEDURE — 11000001 HC ACUTE MED/SURG PRIVATE ROOM

## 2017-01-20 PROCEDURE — 97165 OT EVAL LOW COMPLEX 30 MIN: CPT | Performed by: SPECIALIST

## 2017-01-20 PROCEDURE — 0L8N0ZZ DIVISION OF RIGHT LOWER LEG TENDON, OPEN APPROACH: ICD-10-PCS | Performed by: PODIATRIST

## 2017-01-20 PROCEDURE — 94761 N-INVAS EAR/PLS OXIMETRY MLT: CPT

## 2017-01-20 PROCEDURE — 0Y6P0Z0 DETACHMENT AT RIGHT 1ST TOE, COMPLETE, OPEN APPROACH: ICD-10-PCS | Performed by: PODIATRIST

## 2017-01-20 PROCEDURE — 97161 PT EVAL LOW COMPLEX 20 MIN: CPT

## 2017-01-20 PROCEDURE — 83735 ASSAY OF MAGNESIUM: CPT

## 2017-01-20 PROCEDURE — 85025 COMPLETE CBC W/AUTO DIFF WBC: CPT

## 2017-01-20 RX ADMIN — INSULIN ASPART 4 UNITS: 100 INJECTION, SOLUTION INTRAVENOUS; SUBCUTANEOUS at 08:01

## 2017-01-20 RX ADMIN — INSULIN ASPART 8 UNITS: 100 INJECTION, SOLUTION INTRAVENOUS; SUBCUTANEOUS at 04:01

## 2017-01-20 RX ADMIN — MICONAZOLE NITRATE: 20 CREAM TOPICAL at 08:01

## 2017-01-20 RX ADMIN — PIPERACILLIN SODIUM AND TAZOBACTAM SODIUM 4.5 G: 4; .5 INJECTION, POWDER, LYOPHILIZED, FOR SOLUTION INTRAVENOUS at 09:01

## 2017-01-20 RX ADMIN — INSULIN ASPART 8 UNITS: 100 INJECTION, SOLUTION INTRAVENOUS; SUBCUTANEOUS at 08:01

## 2017-01-20 RX ADMIN — MICONAZOLE NITRATE: 20 CREAM TOPICAL at 09:01

## 2017-01-20 RX ADMIN — INSULIN DETEMIR 15 UNITS: 100 INJECTION, SOLUTION SUBCUTANEOUS at 08:01

## 2017-01-20 RX ADMIN — ENOXAPARIN SODIUM 40 MG: 100 INJECTION SUBCUTANEOUS at 11:01

## 2017-01-20 RX ADMIN — INSULIN ASPART 10 UNITS: 100 INJECTION, SOLUTION INTRAVENOUS; SUBCUTANEOUS at 04:01

## 2017-01-20 RX ADMIN — PIPERACILLIN SODIUM AND TAZOBACTAM SODIUM 4.5 G: 4; .5 INJECTION, POWDER, LYOPHILIZED, FOR SOLUTION INTRAVENOUS at 12:01

## 2017-01-20 RX ADMIN — INSULIN ASPART 2 UNITS: 100 INJECTION, SOLUTION INTRAVENOUS; SUBCUTANEOUS at 09:01

## 2017-01-20 RX ADMIN — PIPERACILLIN SODIUM AND TAZOBACTAM SODIUM 4.5 G: 4; .5 INJECTION, POWDER, LYOPHILIZED, FOR SOLUTION INTRAVENOUS at 05:01

## 2017-01-20 NOTE — PT/OT/SLP EVAL
Occupational Therapy  Evaluation    Indio Ryder Jr.   MRN: [id number]   Admitting Diagnosis: Subacute osteomyelitis of right foot    OT Date of Treatment: 01/20/17   OT Start Time: 0938  OT Stop Time: 1008  OT Total Time (min): 30 min    Billable Minutes:  Evaluation 30    Diagnosis: Subacute osteomyelitis of right foot   R great toe amputation and Achilles tendon lengthening    Past Medical History   Diagnosis Date    Diabetes mellitus type II     Diabetic foot ulcer     Hyperlipidemia     Hypertension     Peripheral neuropathy     Subacute osteomyelitis of right foot 1/12/2017     Streptococcus agalactiae, Actinomyces odontolyticus    Ulcerative colitis, unspecified       Past Surgical History   Procedure Laterality Date    Toe amputation Right 06/05/2015    Toe amputation Right 01/19/2017       Referring physician: Jb  Date referred to OT: 01/19/17    General Precautions: Standard, fall (standard)  Orthopedic Precautions: RLE non weight bearing  Braces:  (Post op boot)          Patient History:  Living Environment  Lives With: alone  Living Arrangements: house  Home Accessibility: other (see comments) (No concerns at his home; he will stay with mother who has 4 EARLINE with R HR)  Number of Stairs to Enter Home: 4  Stair Railings at Home: outside, present on right side  Living Environment Comment: Pt Ind with ADLs and IADLs PTA.  Pt plans to stay at mother's house during his recovery, who has 4STE with R HR.  Equipment Currently Used at Home: none    Prior level of function:   Bed Mobility/Transfers: independent  Grooming: independent  Bathing: independent  Upper Body Dressing: independent  Lower Body Dressing: independent  Toileting: independent  Home Management Skills: independent  Occupation: Full time employment  IADL Comments: Pt reports driving, working, shopping and cleaning PTA.     Dominant hand: right    Subjective:  Communicated with RN prior to session.    Chief Complaint: No  complaints  Patient/Family stated goals: To be independent again    Pain Ratin/10              Pain Rating Post-Intervention: 0/10    Objective:  Patient found with: bed alarm    Cognitive Exam:  Oriented to: Person, Place, Time and Situation  Follows Commands/attention: Follows multistep  commands  Communication: clear/fluent  Memory:  No Deficits noted  Safety awareness/insight to disability: intact  Coping skills/emotional control: Appropriate to situation    Visual/perceptual:  Intact    Physical Exam:  Postural examination/scapula alignment: No postural abnormalities identified      Sensation:   Intact for UEs    Upper Extremity Range of Motion:  Right Upper Extremity: WNL  Left Upper Extremity: WNL    Upper Extremity Strength:  Right Upper Extremity: WFL  Left Upper Extremity: WFL      Fine motor coordination:   Intact    Gross motor coordination: WFL    Functional Mobility:  Bed Mobility:  Rolling/Turning to Left: Modified independent  Rolling/Turning Right: Modified independent  Supine to Sit: Modified Independent    Transfers:  Sit <> Stand Assistance: Contact Guard Assistance  Sit <> Stand Assistive Device: Rolling Walker (and axillary crutches)  Toilet Transfer Assistance: Contact Guard Assistance  Toilet Transfer Assistive Device: Axillary Crutches    Functional Ambulation: Pt ambulated bed > hallway > toilet with axillary crutches with CGA.    Activities of Daily Living:  Feeding Level of Assistance: Activity did not occur    UE Dressing Level of Assistance: Set-up Assistance    LE Dressing Level of Assistance: Contact guard (CGA while buttoning/zipping pants in stance)       Grooming Level of Assistance: Supervision     Toileting Level of Assistance: Activity did not occur     Bathing Level of Assistance: Activity did not occur      Balance:   Static Sit: GOOD+: Takes MAXIMAL challenges from all directions.    Dynamic Sit: GOOD+: Maintains balance through MAXIMAL excursions of active trunk  "motion  Static Stand: FAIR: Maintains without assist but unable to take challenges  Dynamic stand: FAIR: Needs CONTACT GUARD during gait      AM-PAC 6 CLICK ADL  How much help from another person does this patient currently need?  1 = Unable, Total/Dependent Assistance  2 = A lot, Maximum/Moderate Assistance  3 = A little, Minimum/Contact Guard/Supervision  4 = None, Modified [place]/Independent    Putting on and taking off regular lower body clothing? : 3  Bathing (including washing, rinsing, drying)?: 3  Toileting, which includes using toilet, bedpan, or urinal? : 3  Putting on and taking off regular upper body clothing?: 4  Taking care of personal grooming such as brushing teeth?: 4  Eating meals?: 4  Total Score: 21    AM-PAC Raw Score CMS "G-Code Modifier Level of Impairment Assistance   6 % Total / Unable   7 - 9 CM 80 - 100% Maximal Assist   10 - 14 CL 60 - 80% Moderate Assist   15 - 19 CK 40 - 60% Moderate Assist   20 - 22 CJ 20 - 40% Minimal Assist   23 CI 1-20% SBA / CGA   24 CH 0% Independent/ Mod I       Patient left sitting EOB with PT present    Assessment:  Indio Ryder JrMarshall is a 46 y.o. male with a medical diagnosis of Subacute osteomyelitis of right foot and presents with mild deficits in balance limiting I with ADLs.  Pt will benefit from OT services to increase I with ADLs and IADLs..    Rehab identified problem list/impairments: Rehab identified problem list/impairments: impaired balance, impaired functional mobilty, impaired self care skills, decreased lower extremity function    Rehab potential is good.    Activity tolerance: Good    Discharge recommendations: Discharge Facility/Level Of Care Needs: home   mil  Barriers to discharge: Barriers to Discharge: None    Equipment recommendations: crutches, axillary, shower chair     GOALS:   Occupational Therapy Goals        Problem: Occupational Therapy Goal    Goal Priority Disciplines Outcome Interventions   Occupational Therapy " Goal     OT, PT/OT     Description:  Goals to be met by: 02/03/17     Patient will increase functional independence with ADLs by performing:    UE Dressing with Modified [place].  LE Dressing with Modified [place].  Grooming while standing with axillary crutches with Modified [place].  Toileting from toilet with axillary crutches with Modified [place] for hygiene and clothing management.   Toilet transfer to toilet with axillary crutches with Modified [place].                PLAN:  Patient to be seen 5 x/week to address the above listed problems via self-care/home management, therapeutic activities, therapeutic exercises  Plan of Care expires: 02/03/17  Plan of Care reviewed with: patient    OT G-codes  Functional Assessment Tool Used: AM-PAC  Score: 21  Functional Limitation: Self care  Self Care Current Status (): WEI  Self Care Goal Status (): BRANDYN Lucero OT  01/20/2017

## 2017-01-20 NOTE — PROGRESS NOTES
Progress Note  Podiatry      SUBJECTIVE     History of Present Illness:  Indio Ryder Jr. is a 46 y.o. male who  has a past medical history of Diabetes mellitus type II; Diabetic foot ulcer; Hyperlipidemia; Hypertension; Peripheral neuropathy; Subacute osteomyelitis of right foot; and Ulcerative colitis, unspecified. He has had an ulcer sub first MTPJ right foot off and on for several years. He is a patient of Dr. Manuel's outpatient who sent him in to the ED for osteomyelitis. Patient relates that he did not see Dr. Manuel for a long period prior to this most recent visit due to insurance reasons. He has been trying to treat this ulcer himself bandaging it daily. Denies f/c/n/v. History of partial 5th ray amputation about 2 yrs ago with no complication.    1/20/17: Patient seen bedside s/p day 1 right foot hallux amputation and deep bone biopsy. Denies pain, remains non weight bearing. No acute pedal changes.    Scheduled Meds:   enoxaparin  40 mg Subcutaneous Daily    insulin aspart  8 Units Subcutaneous TIDWM    insulin detemir  15 Units Subcutaneous Daily    insulin detemir  25 Units Subcutaneous QHS    miconazole   Topical (Top) BID    piperacillin-tazobactam 4.5 g in dextrose 5 % 100 mL IVPB (ready to mix system)  4.5 g Intravenous Q8H     Continuous Infusions:     PRN Meds:acetaminophen, dextrose 50%, dextrose 50%, glucagon (human recombinant), glucose, glucose, insulin aspart, ondansetron, oxycodone, oxycodone, promethazine (PHENERGAN) IVPB, ramelteon, sodium chloride 0.9%    Review of patient's allergies indicates:  No Known Allergies     Past Medical History   Diagnosis Date    Diabetes mellitus type II     Diabetic foot ulcer     Hyperlipidemia     Hypertension     Peripheral neuropathy     Subacute osteomyelitis of right foot 1/12/2017     Streptococcus agalactiae, Actinomyces odontolyticus    Ulcerative colitis, unspecified      Past Surgical History   Procedure Laterality Date     Toe amputation Right 06/05/2015    Toe amputation Right 01/19/2017     Family History   Problem Relation Age of Onset    Adopted: Yes    Hypertension Mother     Cancer Mother      breast    Diabetes Mother     Hypertension Father     Cataracts Father     Diabetes Sister     Hypertension Maternal Aunt      Social History   Substance Use Topics    Smoking status: Never Smoker    Smokeless tobacco: None    Alcohol use No       Review of Systems:  Constitutional: no fever or chills, pain well controlled  Respiratory: no cough or shortness of breath  Cardiovascular: no chest pain or palpitations  Gastrointestinal: no nausea or vomiting, tolerating diet  Musculoskeletal: no arthralgias or myalgias  Neurological: history of numbness or paresthesias in the feet    OBJECTIVE     Vital Signs (Most Recent):  Temp: 98.3 °F (36.8 °C) (01/20/17 0800)  Pulse: 94 (01/20/17 0800)  Resp: 18 (01/20/17 0800)  BP: 134/78 (01/20/17 0800)  SpO2: 96 % (01/20/17 0856)    Vital Signs Range (Last 24H):  Temp:  [98.1 °F (36.7 °C)-99.8 °F (37.7 °C)]   Pulse:  []   Resp:  [9-18]   BP: (130-162)/()   SpO2:  [96 %-99 %]     Physical Exam:  General: Oriented to person, place, time, and situation. No acute distress.  Vascular: DP and PT pulses are 2+ bilaterally. CRT<3 seconds to digits 1-5 bilaterally. Marked edema to the right LE   Musculoskeletal: Equinus noted b/l ankles with < 10 deg DF noted. Strength 5/5 in DF/PF/Inv/Ev resistance with no reproduction of pain in any direction.   Neuro: Protective sensation absent bilateral   Derm: No open lesions, lacerations or wounds noted to the left foot.     Right Foot. Incisions well coapted with sutures in place. Sanguinous drainage noted, no purulence, no malodor and no erythema.                     Laboratory:  CBC:     Recent Labs  Lab 01/20/17  0535   WBC 6.68   RBC 3.61*   HGB 10.1*   HCT 32.3*      MCV 90   MCH 28.0   MCHC 31.3*     CMP:     Recent Labs  Lab  01/20/17  0535   *   CALCIUM 8.8      K 4.0   CO2 29      BUN 9   CREATININE 1.0     ESR:     Recent Labs  Lab 01/16/17  0359   SEDRATE 113*     CRP:     Recent Labs  Lab 01/16/17  0359   CRP 5.1     Microbiology Results (last 7 days)     Procedure Component Value Units Date/Time    Culture, Anaerobe [[id number]] Collected:  01/16/17 0718    Order Status:  Completed Specimen:  Tissue from Toe, Right Foot Updated:  01/20/17 1042     Anaerobic Culture --     ACTINOMYCES ODONTOLYTICUS  Few      Narrative:       1st ray    Aerobic culture [[id number]] Collected:  01/19/17 1257    Order Status:  Completed Specimen:  Bone from Toe, Right Foot Updated:  01/20/17 0729     Aerobic Bacterial Culture No growth    Culture, Anaerobe [[id number]] Collected:  01/19/17 1257    Order Status:  Completed Specimen:  Bone from Toe, Right Foot Updated:  01/20/17 0712     Anaerobic Culture Culture in progress    Gram stain [[id number]] Collected:  01/19/17 1257    Order Status:  Completed Specimen:  Bone from Toe, Right Foot Updated:  01/19/17 1746     Gram Stain Result Rare WBC's      No organisms seen    Fungus culture [[id number]] Collected:  01/19/17 1257    Order Status:  Sent Specimen:  Bone from Toe, Right Foot Updated:  01/19/17 1628    AFB Culture & Smear [[id number]] Collected:  01/19/17 1257    Order Status:  Sent Specimen:  Bone from Toe, Right Foot Updated:  01/19/17 1628    Aerobic culture [[id number]] Collected:  01/16/17 0718    Order Status:  Completed Specimen:  Tissue from Toe, Right Foot Updated:  01/18/17 0736     Aerobic Bacterial Culture Skin janes,  no predominant organism    Narrative:       1st ray    AFB Culture & Smear [[id number]] Collected:  01/16/17 0718    Order Status:  Completed Specimen:  Tissue from Toe, Right Foot Updated:  01/17/17 2127     AFB Culture & Smear Culture in progress     AFB CULTURE STAIN No acid fast bacilli seen.    Narrative:       1st ray    Fungus culture [[id number]]  Collected:  01/16/17 0718    Order Status:  Completed Specimen:  Tissue from Toe, Right Foot Updated:  01/17/17 1142     Fungus (Mycology) Culture Culture in progress    Narrative:       1st ray    Culture, Anaerobe [[id number]] Collected:  01/12/17 1505    Order Status:  Completed Specimen:  Abscess from Foot, Right Updated:  01/17/17 1112     Anaerobic Culture --     ACTINOMYCES ODONTOLYTICUS  Many      Blood culture [[id number]] Collected:  01/12/17 0342    Order Status:  Completed Specimen:  Blood from Peripheral, Forearm, Left Updated:  01/17/17 0812     Blood Culture, Routine No growth after 5 days.    Blood culture [[id number]] Collected:  01/12/17 0230    Order Status:  Completed Specimen:  Blood from Peripheral, Right  Arm Updated:  01/17/17 0812     Blood Culture, Routine No growth after 5 days.    Gram stain [[id number]] Collected:  01/16/17 0718    Order Status:  Completed Specimen:  Tissue from Toe, Right Foot Updated:  01/16/17 1302     Gram Stain Result Few WBC's      Moderate Gram positive cocci      Rare Gram negative rods      Rare Gram positive rods    Narrative:       1st ray    Aerobic culture [[id number]] Collected:  01/12/17 1505    Order Status:  Completed Specimen:  Abscess from Foot, Right Updated:  01/16/17 1202     Aerobic Bacterial Culture Skin janes,  no predominant organism    Aerobic culture (Specify Source) **CANNOT BE ORDERED AS STAT** [[id number]] Collected:  01/12/17 0433    Order Status:  Completed Specimen:  Bone from Foot, Right Updated:  01/14/17 1134     Aerobic Bacterial Culture --     STREPTOCOCCUS AGALACTIAE (GROUP B)  Few  Susceptibility testing not routinely performed  Skin janes also present            Diagnostic Results:  X-Ray: reviewed  MRI: pending     Right foot x-ray:   Interval marked deformity and destruction about the first MTP joint involving the base of the first proximal phalanx and head of the first metatarsal highly concerning for septic arthritis/osteomyelitis.  There is associated overlying cellulitis with focal plantar ulceration.    Interval partial amputation of the fifth metatarsal, noting slight irregularity with periosteal reaction involving the distal most portion of the remaining fifth metatarsal concerning for osteomyelitis.    MRI, right foot:   Status post metatarsal amputation of 5th ray.  There is advanced bone marrow replacement of the 1st ray involving the majority of the metatarsal as well as the phalanges.  There is associated cortical destruction, fragmentation of the 1st metatarsal head and extensive soft tissue edema.  There is a plantar ulcer in the region of the MTP joint with a 3 x 3 x 2 cm abscess replacing the joint.  Additional small ulcer noted at the distal aspect of the toe.  Remaining rays demonstrate preserved marrow signal, without evidence for fracture or osteomyelitis.  Lisfranc ligament is intact.  1.  Great toe osteomyelitis involving the phalanges and majority of metatarsal.  Plantar ulcer with abscess at the MTP joint.  Additional small ulcer at the distal aspect of the toe.    ALISSA:The right ankle brachial index was 1.14 which is normal or elevated  The left ankle brachial index was 1.28 which is normal.   The waveforms of both lower extremities by Doppler and PVR's are diminished throughout suggesting arterial disease more significant than the resting ALISSA reflects. 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       ASSESSMENT/PLAN     Assessment:  -DM II with peripheral neuropathy  -Diabetic foot ulcer, right  -Diabetic foot infection with acute on chronic osteomyelitis of 1st ray s/p day 1 right hallux amputation.    Plan:  -Wound assessed and is stable. Painted with betadine, covered with xeroform and wrapped with gauze and cast padding secured with ace. Placed back in orthopedic boot  -Remain non weight bearing to right foot  -Antibiotics per ID recommendations, will likely still need 6 weeks IV antibiotics but we did take a  clean margin of the base of the first metatarsal in surgery if that comes back negative could consider shorter stent of PO medications.  -Follow up with Dr. Wilkes in 1 week after discharge on [facility]. OK for discharge from podiatry stand point after recommendations from ID on antibiotic management are in.  -If questions, please contact the on-call podiatry resident    Bill Muhammad DPM PGY-3  Pager [phone]    I have reviewed and concur with the resident's history, physical, assessment, and plan.  I have personally interviewed and examined the patient at bedside.

## 2017-01-20 NOTE — PROGRESS NOTES
"[facility]  Progress Note    Patient Name: Indio Ryder Jr.  MRN: [id number]  Patient Class: IP- Inpatient   Admission Date: 1/12/2017  Length of Stay: 8 days  Attending Physician: Simon Kelly MD  Primary Care Provider: Lorena Thakur MD        Subjective:     Principal Problem:Subacute osteomyelitis of right foot    HPI:  Indio Ryder Jr. Is a 46 y.o.  man with hypertension and diabetes mellitus type 2 with peripheral neuropathy, s/p 5th toe ray amputation on 6/05/15. He lives in [place], [place]. He is single. He works in [employer] at [employer]. His primary care physician is Dr. Lorena Thakur. His podiatrist is Dr. William Manuel.    He lost his insurance and was unable to continue following up with Dr. Manuel in October 2016. He had taken ciprofloxacin and metronidazole for 6 months up until then. He was performing wound care himself at home "a couple of times per week". He noticed malodorous drainage, redness and swelling of his right foot prior to Marc, accompanied by fever, chills, and fatigue. He regained insurance beginning January 1, 2017.  Dr. Manuel saw him in his clinic on 1/10/17 and instructed him to go to an ED for IV antibiotics and to have "Dr. Matthew take a look at it". Dr. Matthew is a general surgeon at [facility]. He went to [facility], where there are also podiatrists, on 1/12/17.    He was afebrile, but pulse was 110s. WBC count was 9520 and hemoglobin and hematocrit were 11.8 and 37. ESR was 113, CRP was 19.6. He was hyperglycemic (glucose 421). X-ray of the right foot showed cellulitis and osteomyelitis of the right 1st and 5th metatarsals. He was given 1 liter normal saline, ceftriaxone, and vancomycin in the ED. He was admitted to [facility].       Hospital Course:  Vancomycin was continued and ceftriaxone changed to piperacillin-tazobactam. Podiatry was " consulted and performed bedside debridement and culture. He reported that he had been hyperglycemic recently due to the infection. Hemoglobin A1c was 15%. MRI showed osteomyelitis of most of the 1st metatarsal and the phalanges. Streptococcus agalactiae grew in bedside wound culture. Scheduled insulin doses were increased much higher than home doses to treat hyperglycemia. On 1/16/17 Dr. Ramirez Wilkes performed resection of the first metatarsal head and base of the proximal phalanx, and I&D of the hallux at the IPJ level, with partial closure with retention sutures and iodoform packing, as part one of a staged procedure. Piperacillin-tazobactam was continued to cover group B Streptococcus and Actinomyces odontolyticus per Infectious Disease. He returned to the OR on 1/19/17 for the second part of the procedure including amputation of the right great toe and Achilles tendon lengthening.     Interval History: Post op toe amputation.    Review of Systems   Constitutional: Negative for chills and fever.   Cardiovascular: Negative for chest pain and palpitations.   Gastrointestinal: Negative for nausea and vomiting.     Objective:     Vital Signs (Most Recent):  Temp: 98.9 °F (37.2 °C) (01/19/17 2019)  Pulse: 102 (01/19/17 2019)  Resp: 18 (01/19/17 2019)  BP: (!) 162/97 (01/19/17 2019)  SpO2: 99 % (01/19/17 2116) Vital Signs (24h Range):  Temp:  [98 °F (36.7 °C)-98.9 °F (37.2 °C)] 98.9 °F (37.2 °C)  Pulse:  [] 102  Resp:  [9-20] 18  SpO2:  [97 %-99 %] 99 %  BP: (130-162)/() 162/97     Weight: 93 kg (205 lb)  Body mass index is 27.05 kg/(m^2).    Intake/Output Summary (Last 24 hours) at 01/19/17 3324  Last data filed at 01/19/17 1600   Gross per 24 hour   Intake              900 ml   Output             1100 ml   Net             -200 ml      Physical Exam   Constitutional: He is oriented to person, place, and time. He appears well-developed and well-nourished. No distress.   Musculoskeletal: He exhibits no edema.    Right foot dressed   Neurological: He is alert and oriented to person, place, and time.   Nursing note and vitals reviewed.      Significant Labs:   CBC:   Recent Labs  Lab 01/18/17  0445   WBC 6.46   HGB 10.3*   HCT 32.8*        CMP:   Recent Labs  Lab 01/18/17  0445      K 3.6      CO2 31*   GLU 72   BUN 8   CREATININE 1.0   CALCIUM 9.1   ANIONGAP 7*   EGFRNONAA >60       Significant Imaging: I have reviewed all pertinent imaging results/findings within the past 24 hours.   X-Ray Foot Complete Right 1/19/17: Examination limited by overlying walking boot.  Postsurgical changes consistent with great toe amputation to the level of the proximal first metatarsal.  Remote changes consistent with amputation through the mid fifth metatarsal.  Persistent lateral deviation of the second through fourth toes.  Skin staples and small amount of postoperative air visualized at the great toe amputation site.  No definite complications seen.    AMPUTATION-TOE and Tendo achilles lengthening (Right), Deep Bone Biopsy Right foot 1/19/17: Surgeon: Dr. Wilkes, DPM Primary; Dr. Muhammad, DPM PGY-3 First assist    Assessment/Plan:      * Subacute osteomyelitis of right foot  Diabetic ulcer of right foot associated with type 2 diabetes mellitus/GBS infection    S/p resection of the first metatarsal head and base of the proximal phalanx, and I&D of the hallux at the IPJ level, with partial closure with retention sutures and iodoform packing. S/p toe amputation and Achilles tendon lengthening. Continue piperacillin-tazobactam empirically. Follow up final culture results. Appreciate Infectious Disease and Podiatry.       Essential hypertension  Not on medications.      Hyperlipidemia  Not currently on medications.      Diabetic ulcer of right foot associated with type 2 diabetes mellitus  S/p debridement and will get partial amputation today.  Appreciate podiatry assistance. Appreciate ID assistance with the antibiotics.        Type 2 diabetes mellitus with diabetic neuropathy, with long-term current use of insulin  Takes metformin 500 mg BID and insulin glargine 25 units HS at home. Hemoglobin A1c 15%. Giving insulin detemir 15 units am 25 units HS, insulin aspart 8 units TID with meals, moderate dose insulin sliding scale.       VTE Risk Mitigation         Ordered     enoxaparin injection 40 mg  Daily     Route:  Subcutaneous        01/12/17 1239     Medium Risk of VTE  Once      01/12/17 1239          Simon Kelly MD  Department of Hospital Medicine   [facility]

## 2017-01-20 NOTE — PLAN OF CARE
Problem: Occupational Therapy Goal  Goal: Occupational Therapy Goal  Goals to be met by: 02/03/17     Patient will increase functional independence with ADLs by performing:    UE Dressing with Modified [place].  LE Dressing with Modified [place].  Grooming while standing with axillary crutches with Modified [place].  Toileting from toilet with axillary crutches with Modified [place] for hygiene and clothing management.   Toilet transfer to toilet with axillary crutches with Modified [place].   Outcome: Ongoing (interventions implemented as appropriate)  OT evaluation complete.  Pt will benefit from axillary crutches and shower chair at discharge.

## 2017-01-20 NOTE — OP NOTE
Date: 1/19/17    Surgeon: Dr. Wilkes, DPM Primary; Dr. Jb DPM PGY-3 First assist    Preoperative Diagnosis:    Diabetic foot Infection, right  Osteomyelitis, 1st metatarsal and hallux, right  Diabetic ulcer of right foot associated with type 2 diabetes mellitus [E11.621, L97.519]  Equinus contracture of right ankle [M24.571]    Postoperative Diagnosis: Same    Procedure Performed:    1. Achilles tendon lengthening (Right)   2. AMPUTATION-TOE, right hallux  3. Deep Bone Biopsy, first metatarsal, right    Anaesthesia: Monitored anesthesia care with local     Hemostasis: Calf tourniquet set at 250 mmHg for a total of 36 minutes    Estimated Blood Loss: 20 mL    Specimen:   Specimen (12h ago through future)    Start       Ordered     01/19/17 1255   Specimen to Pathology - Surgery Once    Comments: 2. Clean margin 1st metatarsal- perm     01/19/17 1257     01/19/17 1249   Specimen to Pathology - Surgery Once    Comments: Right big toe- perm     01/19/17 1253     Culture: Aerobic, Anaerobic, Acid Fast and Fungal.    Complications: None    Condition: Stable    PRE-PROCEDURE:   The patient was brought into the operating room and placed on the operating table in the supine position. Following IV sedation, local anesthesia was obtained utilizing 20 cc's of 0.5% Marcaine plain. Calf tourniquet was placed. The foot and leg was then scrubbed, prepped, and draped in the usual aseptic manner. The foot was draped off with iodoban.    PROCEDURE:     Silfverskiold test was used to test patient's ankle ROM which was noted to be less then 5 degrees past neutral with knee flexed or extended. Therefore it was decided to perform RUSLAN.   Attention was drawn to the posterior right Achilles tendon area where three skin incisions were performed at 3, 6 and 9cm proximal from the insertion of the Achilles tendon in an alternating fashion over 50% of the width of the tendon. A #15 blade was used to perform each incision though skin and  subcutaneous tissue. The achilles tendon was visualized and medial and lateral borders observed at each location preserve prevent rupture. The most proximal and distal incisions and tenotomy were made at the medial 1/2 of the tendon and the central incision was made lateral half. The tendon was Z-lengthened approximally 1.5-2 cm allowing ankle dorsiflexion to 10 degrees with knee extended. The tendon remained intact. Hemostasis achieved with compression. The incisions were cleansed with saline and 3-0 Nylon was used to reapproximate the incisions using simple sutures. Dressed with xeroform, 4x4s.    The limb was elevated and tourniquet inflated. Attention was then directed to the right hallux where a previous incision and drainage with a first metatarsal head resection was done earlier in the week. A fishmouth-type incision was made with a #15 blade incorporating the previous incision done along the first metatarsal previously. Soft tissue was carefully lifted from the base of the proximal phalanx until what used to be the metatarsophalangeal joint was visualized. Both proximal and distal phalanx were completely necrotic with purulence noted within the bone. The hallux was removed and sent to pathology. Surrounding necrotic and nonviable soft tissue was excised with 15 blade until only healthy, bleeding tissue remained. The tourniquet was released and hemostasis was achieved with electrocautery.  The area was copiously flushed with sterile saline.    The medial incision was extended to the base of the 1st metatarsal. Next procedure involved deep bone biopsy from the first metatarsal. Key elevator was used to expose the cortical surface of the remaining 1st metatarsal. Sagital saw was used to remove a deep specimen including cortical and medually bone to rule out and/or guide treatment of residual osteomyelitis. This biopsy was sent for culture and pathologic inspection.   The remaining soft tissue was well perfused  and viable. The skin was then closed with 4-0 nylon with a combination of vertical mattress and simple sutures, as well as skin staples.     AFTER THE PROCEDURE:   The incision was dressed with xeroform and covered with a sterile compressive dressing consisting of 4 X 4s and cast padding. An ACE wrap and tall orthopedic boot was then applied with ankle neutral. The patient tolerated the procedure and anesthesia well. The patient was transported to recovery with vital signs stable and vascular status intact. Patient is to remain non weight bearing to the right LE.     Bill Muhammad DPM PGY-3  Pager [phone]    I attest to the note above,    Ramirez Wilkes DPM

## 2017-01-20 NOTE — ASSESSMENT & PLAN NOTE
45 y/o male with DM II and right foot ulcerations - history of longstanding ulcerations of this foot over 3 years - do not have old records. Patient had been on cipro and flagyl for about 6 months up until October 2016. He just restarted them PTA - had a few left at home.      Patient placed on vanco and zosyn empirically  So far group B strep AND actinomyces odontolyticus, and skin janes from superficial cultures 1/12 and on 1/16 surgical cultures - ACTINOMYCES ODONTOLYTICUS and skin janes    Have discontinued vanco 1/14, continue zosyn for now  Await surgical cultures from 1/19

## 2017-01-20 NOTE — PROGRESS NOTES
[facility]  Infectious Disease  Progress Note    Patient Name: Indio Ryder Jr.  MRN: [id number]  Admission Date: 1/12/2017  Length of Stay: 7 days  Attending Physician: Bharathi Terrazas MD  Primary Care Provider: Lorena Thakur MD    Isolation Status: No active isolations  Assessment/Plan:      * Subacute osteomyelitis of right foot  47 y/o male with DM II and right foot ulcerations - history of longstanding ulcerations of this foot over 3 years - do not have old records. Patient had been on cipro and flagyl for about 6 months up until October 2016. He just restarted them PTA - had a few left at home.      Patient placed on vanco and zosyn empirically  So far group B strep AND actinomyces odontolyticus, and skin janes from superficial cultures 1/12 and on 1/16 surgical cultures - ACTINOMYCES ODONTOLYTICUS and skin janes    Have discontinued vanco 1/14, continue zosyn for now  Await surgical cultures from 1/19          Group B streptococcal infection  Right foot positive for GBS - superficial cultures (also positive for actinomyces)    Actinomyces infection  Right foot positive for actinomyces odontolyticus (also Group B strep)    Tinea pedis of left foot  Topical antifungal prescribed      Anticipated Disposition: unclear at this time - await surgical cultures from 1/19. May need 2 weeks antibiotics postop if no residual osteo. May need 6 weeks antibiotics if residual osteo in foot remains. Not sure at this time if he needs IV or PO. Continue zosyn for now.     Thank you for your consult. Will follow - Dr. Gregorio will  U ID Service tomorrow 1/20/17. Call [phone] if questions.     Subjective:     Principal Problem:Subacute osteomyelitis of right foot    Interval History: patient seen preop today - had right great toe amputation and achilles lengthening. Had no complaints preop.     HPI:  47 y/o male with HTN, DM II, with peripheral neuropathy. Patient has had diabetic ulcer of  right foot followed by podiatry as outpatient (Dr. William Manuel) up until October when patient lost insurance benefits. Patient was doing own wound care a few times a week. He noticed malodorous drainage and swelling and erythema in December prior to christmas. Patient regained insurance Jan 1, 2017 and went to podiatry on 1/10 and he was instructed to come to the ED. Patient presented to ED 1/12/17 - x ray of right foot concerning for osteo of 1st and 5th MT and cellulitis. Patient was given 1L NS and vanco and ceftriaxone in ED and admitted to hospital medicine. ED reports patient has history of 3 years of ongoing ulcer to right foot. MRI of foot ordered. Podiatry consulted. Patient placed on vanco and zosyn on admit.     Patient stated he had been on po cipro and flagyl for about 6 months until October. He had a few antibiotic pills left and he took them recently when he started to have drainage from right foot PTA. Patient does not recall last tetanus shot.     1/12 right foot culture - positive for group B strep  1/14 vancomycin discontinued, zosyn continued  1/15 Podiatry performed I + D right great toe  1/19 Surgery performed:  Right great toe amputation with primary closure. Percutaneous tendo achilles lengthening. Deep bone biopsy of remaining first metatarsal base.      Review of Systems   Respiratory: Negative for shortness of breath.    Gastrointestinal: Negative for diarrhea, nausea and vomiting.     Objective:     Vital Signs (Most Recent):  Temp: 98.1 °F (36.7 °C) (01/19/17 1600)  Pulse: 91 (01/19/17 1600)  Resp: 18 (01/19/17 1600)  BP: (!) 146/86 (01/19/17 1600)  SpO2: 99 % (01/19/17 1430) Vital Signs (24h Range):  Temp:  [98 °F (36.7 °C)-98.5 °F (36.9 °C)] 98.1 °F (36.7 °C)  Pulse:  [70-91] 91  Resp:  [9-20] 18  SpO2:  [97 %-99 %] 99 %  BP: (130-161)/() 146/86     Weight: 93 kg (205 lb)  Body mass index is 27.05 kg/(m^2).    Estimated Creatinine Clearance: 102.1 mL/min (based on Cr of  1).    Physical Exam   Cardiovascular: Normal heart sounds.    No murmur heard.  Pulmonary/Chest: Breath sounds normal. No respiratory distress.   Abdominal: Bowel sounds are normal. He exhibits no distension.   Musculoskeletal: He exhibits no edema.   Right foot bandaged       Significant Labs:      surgical cultures pending bone toe right foot     tissue toe right foot 1st ray - GPC, GNR, GPR - skin janes no predominance, and ACTINOMYCES ODONTOLYTICUS      abscess right foot - ACTINOMYCES ODONTOLYTICUS. Skin janes no predominant org, and Group B strep    Blood Culture:   Recent Labs  Lab 17  0230 17  0342   LABBLOO No growth after 5 days. No growth after 5 days.     CBC:   Recent Labs  Lab 17  0445   WBC 6.46   HGB 10.3*   HCT 32.8*        CMP:   Recent Labs  Lab 17  0445      K 3.6      CO2 31*   GLU 72   BUN 8   CREATININE 1.0   CALCIUM 9.1   ANIONGAP 7*   EGFRNONAA >60     Urine Studies:   Recent Labs  Lab 17  1341   COLORU Yellow   APPEARANCEUA Clear   PHUR 6.0   SPECGRAV 1.025   PROTEINUA Negative   GLUCUA 3+*   KETONESU 1+*   BILIRUBINUA Negative   OCCULTUA Negative   NITRITE Negative   UROBILINOGEN Negative   LEUKOCYTESUR Negative   BACTERIA None       Significant Imagin/19 right foot x ray - Postsurgical changes as above        Rola Mckinney MD  Infectious Disease  [facility]

## 2017-01-20 NOTE — PROGRESS NOTES
Patient remains in hospital. POD#1 from great toe amputation.  Podiatry following patient.  ID following patient.  Patient with no insurance at this time (per patient no insurance until 2/1)    Geetha Moraes, RN, CCM, CMSRN  RN Transition Navigator  [phone]

## 2017-01-20 NOTE — ASSESSMENT & PLAN NOTE
47 y/o male with DM II and right foot ulcerations - history of longstanding ulcerations of this foot over 3 years -     Patient placed on vanco and zosyn empirically; now on piptazo to cover Strep agalactiae and Actinomyces in superficial cultures    Rec:  --continue piptazo for now and adjust with surgical cultures from 1/19

## 2017-01-20 NOTE — PROGRESS NOTES
[facility]  Infectious Disease  Progress Note    Patient Name: Indio Ryder Jr.  MRN: [id number]  Admission Date: 1/12/2017  Length of Stay: 8 days  Attending Physician: Simon Kelly MD  Primary Care Provider: Lorena Thakur MD    Isolation Status: No active isolations  Assessment/Plan:      * Subacute osteomyelitis of right foot  47 y/o male with DM II and right foot ulcerations - history of longstanding ulcerations of this foot over 3 years -     Patient placed on vanco and zosyn empirically; now on piptazo to cover Strep agalactiae and Actinomyces in superficial cultures    Rec:  --continue piptazo for now and adjust with surgical cultures from 1/19            Anticipated Disposition: home IV Abx    Thank you for your consult. I will follow-up with patient. Please contact us if you have any additional questions.    Subjective:     Principal Problem:Subacute osteomyelitis of right foot    Interval History: no new complaints    HPI:  47 y/o male with HTN, DM II, with peripheral neuropathy. Patient has had diabetic ulcer of right foot followed by podiatry as outpatient (Dr. William Manuel) up until October when patient lost insurance benefits. Patient was doing own wound care a few times a week. He noticed malodorous drainage and swelling and erythema in December prior to christmas. Patient regained insurance Jan 1, 2017 and went to podiatry on 1/10 and he was instructed to come to the ED. Patient presented to ED 1/12/17 - x ray of right foot concerning for osteo of 1st and 5th MT and cellulitis. Patient was given 1L NS and vanco and ceftriaxone in ED and admitted to hospital medicine. ED reports patient has history of 3 years of ongoing ulcer to right foot. MRI of foot ordered. Podiatry consulted. Patient placed on vanco and zosyn on admit.     Patient stated he had been on po cipro and flagyl for about 6 months until October. He had a few antibiotic pills left and he took them recently  when he started to have drainage from right foot PTA. Patient does not recall last tetanus shot.     1/12 right foot culture - positive for group B strep  1/14 vancomycin discontinued, zosyn continued  1/15 Podiatry performed I + D right great toe  1/19 Surgery performed:  Right great toe amputation with primary closure. Percutaneous tendo achilles lengthening. Deep bone biopsy of remaining first metatarsal base.      Review of Systems   All other systems reviewed and are negative.    Objective:     Vital Signs (Most Recent):  Temp: 98.3 °F (36.8 °C) (01/20/17 1600)  Pulse: 83 (01/20/17 1600)  Resp: 20 (01/20/17 1600)  BP: (!) 163/84 (01/20/17 1600)  SpO2: 98 % (01/20/17 1639) Vital Signs (24h Range):  Temp:  [97.8 °F (36.6 °C)-99.8 °F (37.7 °C)] 98.3 °F (36.8 °C)  Pulse:  [] 83  Resp:  [17-20] 20  SpO2:  [96 %-99 %] 98 %  BP: (130-163)/(67-97) 163/84     Weight: 93 kg (205 lb)  Body mass index is 27.05 kg/(m^2).    Estimated Creatinine Clearance: 102.1 mL/min (based on Cr of 1).    Physical Exam   Constitutional: He is oriented to person, place, and time. He appears well-developed and well-nourished. No distress.   HENT:   Head: Normocephalic and atraumatic.   Mouth/Throat: Oropharynx is clear and moist.   Poor dentition   Eyes: Conjunctivae and EOM are normal. Pupils are equal, round, and reactive to light.   Neck: Normal range of motion. Neck supple. No JVD present.   Cardiovascular: Normal rate, regular rhythm and intact distal pulses.    S4   Pulmonary/Chest: Effort normal and breath sounds normal.   Abdominal: Soft. Bowel sounds are normal. He exhibits no distension and no mass. There is no tenderness.   Genitourinary:   Genitourinary Comments: No uriarte   Musculoskeletal:   UE with PIV no lesions; right foot photos noted; left foot OK with hammer toes   Lymphadenopathy:     He has no cervical adenopathy.   Neurological: He is alert and oriented to person, place, and time. He exhibits normal muscle tone.  Coordination normal.   Skin: Skin is warm and dry. No rash noted.   As above in terms of foot   Psychiatric:   Appropriate and cooperative   Nursing note and vitals reviewed.      Significant Labs:   BMP:   Recent Labs  Lab 01/20/17  0535   *      K 4.0      CO2 29   BUN 9   CREATININE 1.0   CALCIUM 8.8   MG 1.6     CBC:   Recent Labs  Lab 01/20/17  0535   WBC 6.68   HGB 10.1*   HCT 32.3*        All pertinent labs within the past 24 hours have been reviewed.    Significant Imaging: I have reviewed all pertinent imaging results/findings within the past 24 hours.        Av Gregorio MD  Infectious Disease  [facility]

## 2017-01-20 NOTE — SUBJECTIVE & OBJECTIVE
Interval History: patient seen preop today - had right great toe amputation and achilles lengthening. Had no complaints preop.     HPI:  47 y/o male with HTN, DM II, with peripheral neuropathy. Patient has had diabetic ulcer of right foot followed by podiatry as outpatient (Dr. William Manuel) up until October when patient lost insurance benefits. Patient was doing own wound care a few times a week. He noticed malodorous drainage and swelling and erythema in December prior to ann. Patient regained insurance Jan 1, 2017 and went to podiatry on 1/10 and he was instructed to come to the ED. Patient presented to ED 1/12/17 - x ray of right foot concerning for osteo of 1st and 5th MT and cellulitis. Patient was given 1L NS and vanco and ceftriaxone in ED and admitted to hospital medicine. ED reports patient has history of 3 years of ongoing ulcer to right foot. MRI of foot ordered. Podiatry consulted. Patient placed on vanco and zosyn on admit.     Patient stated he had been on po cipro and flagyl for about 6 months until October. He had a few antibiotic pills left and he took them recently when he started to have drainage from right foot PTA. Patient does not recall last tetanus shot.     1/12 right foot culture - positive for group B strep  1/14 vancomycin discontinued, zosyn continued  1/15 Podiatry performed I + D right great toe  1/19 Surgery performed:  Right great toe amputation with primary closure. Percutaneous tendo achilles lengthening. Deep bone biopsy of remaining first metatarsal base.      Review of Systems   Respiratory: Negative for shortness of breath.    Gastrointestinal: Negative for diarrhea, nausea and vomiting.     Objective:     Vital Signs (Most Recent):  Temp: 98.1 °F (36.7 °C) (01/19/17 1600)  Pulse: 91 (01/19/17 1600)  Resp: 18 (01/19/17 1600)  BP: (!) 146/86 (01/19/17 1600)  SpO2: 99 % (01/19/17 1430) Vital Signs (24h Range):  Temp:  [98 °F (36.7 °C)-98.5 °F (36.9 °C)] 98.1 °F (36.7  °C)  Pulse:  [70-91] 91  Resp:  [9-20] 18  SpO2:  [97 %-99 %] 99 %  BP: (130-161)/() 146/86     Weight: 93 kg (205 lb)  Body mass index is 27.05 kg/(m^2).    Estimated Creatinine Clearance: 102.1 mL/min (based on Cr of 1).    Physical Exam   Cardiovascular: Normal heart sounds.    No murmur heard.  Pulmonary/Chest: Breath sounds normal. No respiratory distress.   Abdominal: Bowel sounds are normal. He exhibits no distension.   Musculoskeletal: He exhibits no edema.   Right foot bandaged       Significant Labs:      surgical cultures pending bone toe right foot     tissue toe right foot 1st ray - GPC, GNR, GPR - skin janes no predominance, and ACTINOMYCES ODONTOLYTICUS      abscess right foot - ACTINOMYCES ODONTOLYTICUS. Skin janes no predominant org, and Group B strep    Blood Culture:   Recent Labs  Lab 17  0230 17  0342   LABBLOO No growth after 5 days. No growth after 5 days.     CBC:   Recent Labs  Lab 17  0445   WBC 6.46   HGB 10.3*   HCT 32.8*        CMP:   Recent Labs  Lab 17  0445      K 3.6      CO2 31*   GLU 72   BUN 8   CREATININE 1.0   CALCIUM 9.1   ANIONGAP 7*   EGFRNONAA >60     Urine Studies:   Recent Labs  Lab 17  1341   COLORU Yellow   APPEARANCEUA Clear   PHUR 6.0   SPECGRAV 1.025   PROTEINUA Negative   GLUCUA 3+*   KETONESU 1+*   BILIRUBINUA Negative   OCCULTUA Negative   NITRITE Negative   UROBILINOGEN Negative   LEUKOCYTESUR Negative   BACTERIA None       Significant Imagin/19 right foot x ray - Postsurgical changes as above

## 2017-01-20 NOTE — SUBJECTIVE & OBJECTIVE
Interval History: no new complaints    HPI:  45 y/o male with HTN, DM II, with peripheral neuropathy. Patient has had diabetic ulcer of right foot followed by podiatry as outpatient (Dr. William Manuel) up until October when patient lost insurance benefits. Patient was doing own wound care a few times a week. He noticed malodorous drainage and swelling and erythema in December prior to ann. Patient regained insurance Jan 1, 2017 and went to podiatry on 1/10 and he was instructed to come to the ED. Patient presented to ED 1/12/17 - x ray of right foot concerning for osteo of 1st and 5th MT and cellulitis. Patient was given 1L NS and vanco and ceftriaxone in ED and admitted to hospital medicine. ED reports patient has history of 3 years of ongoing ulcer to right foot. MRI of foot ordered. Podiatry consulted. Patient placed on vanco and zosyn on admit.     Patient stated he had been on po cipro and flagyl for about 6 months until October. He had a few antibiotic pills left and he took them recently when he started to have drainage from right foot PTA. Patient does not recall last tetanus shot.     1/12 right foot culture - positive for group B strep  1/14 vancomycin discontinued, zosyn continued  1/15 Podiatry performed I + D right great toe  1/19 Surgery performed:  Right great toe amputation with primary closure. Percutaneous tendo achilles lengthening. Deep bone biopsy of remaining first metatarsal base.      Review of Systems   All other systems reviewed and are negative.    Objective:     Vital Signs (Most Recent):  Temp: 98.3 °F (36.8 °C) (01/20/17 1600)  Pulse: 83 (01/20/17 1600)  Resp: 20 (01/20/17 1600)  BP: (!) 163/84 (01/20/17 1600)  SpO2: 98 % (01/20/17 1639) Vital Signs (24h Range):  Temp:  [97.8 °F (36.6 °C)-99.8 °F (37.7 °C)] 98.3 °F (36.8 °C)  Pulse:  [] 83  Resp:  [17-20] 20  SpO2:  [96 %-99 %] 98 %  BP: (130-163)/(67-97) 163/84     Weight: 93 kg (205 lb)  Body mass index is 27.05  kg/(m^2).    Estimated Creatinine Clearance: 102.1 mL/min (based on Cr of 1).    Physical Exam   Constitutional: He is oriented to person, place, and time. He appears well-developed and well-nourished. No distress.   HENT:   Head: Normocephalic and atraumatic.   Mouth/Throat: Oropharynx is clear and moist.   Poor dentition   Eyes: Conjunctivae and EOM are normal. Pupils are equal, round, and reactive to light.   Neck: Normal range of motion. Neck supple. No JVD present.   Cardiovascular: Normal rate, regular rhythm and intact distal pulses.    S4   Pulmonary/Chest: Effort normal and breath sounds normal.   Abdominal: Soft. Bowel sounds are normal. He exhibits no distension and no mass. There is no tenderness.   Genitourinary:   Genitourinary Comments: No uriarte   Musculoskeletal:   UE with PIV no lesions; right foot photos noted; left foot OK with hammer toes   Lymphadenopathy:     He has no cervical adenopathy.   Neurological: He is alert and oriented to person, place, and time. He exhibits normal muscle tone. Coordination normal.   Skin: Skin is warm and dry. No rash noted.   As above in terms of foot   Psychiatric:   Appropriate and cooperative   Nursing note and vitals reviewed.      Significant Labs:   BMP:   Recent Labs  Lab 01/20/17  0535   *      K 4.0      CO2 29   BUN 9   CREATININE 1.0   CALCIUM 8.8   MG 1.6     CBC:   Recent Labs  Lab 01/20/17  0535   WBC 6.68   HGB 10.1*   HCT 32.3*        All pertinent labs within the past 24 hours have been reviewed.    Significant Imaging: I have reviewed all pertinent imaging results/findings within the past 24 hours.

## 2017-01-20 NOTE — PLAN OF CARE
Problem: Patient Care Overview  Goal: Plan of Care Review  Outcome: Ongoing (interventions implemented as appropriate)  Plan of care reviewed with pt. Pt verbalizes understanding. Bed in lowest position, side rails up times 2, wheels locked, nonslip socks on, and bed alarm on. Call bell w/in reach. Instructed to call for needs/assist oob. Surgical dressing to rle c,d,i. Right lower extremity elevated. Iv antibiotics continued per md order. No c/o pain t/o night. Will continue to monitor.

## 2017-01-20 NOTE — ASSESSMENT & PLAN NOTE
S/p debridement and will get partial amputation today.  Appreciate podiatry assistance. Appreciate ID assistance with the antibiotics.

## 2017-01-20 NOTE — ASSESSMENT & PLAN NOTE
Diabetic ulcer of right foot associated with type 2 diabetes mellitus/GBS infection    S/p resection of the first metatarsal head and base of the proximal phalanx, and I&D of the hallux at the IPJ level, with partial closure with retention sutures and iodoform packing. S/p toe amputation and Achilles tendon lengthening. Continue piperacillin-tazobactam empirically. Follow up final culture results. Appreciate Infectious Disease and Podiatry.

## 2017-01-20 NOTE — SUBJECTIVE & OBJECTIVE
Interval History: Post op toe amputation.    Review of Systems   Constitutional: Negative for chills and fever.   Cardiovascular: Negative for chest pain and palpitations.   Gastrointestinal: Negative for nausea and vomiting.     Objective:     Vital Signs (Most Recent):  Temp: 98.9 °F (37.2 °C) (01/19/17 2019)  Pulse: 102 (01/19/17 2019)  Resp: 18 (01/19/17 2019)  BP: (!) 162/97 (01/19/17 2019)  SpO2: 99 % (01/19/17 2116) Vital Signs (24h Range):  Temp:  [98 °F (36.7 °C)-98.9 °F (37.2 °C)] 98.9 °F (37.2 °C)  Pulse:  [] 102  Resp:  [9-20] 18  SpO2:  [97 %-99 %] 99 %  BP: (130-162)/() 162/97     Weight: 93 kg (205 lb)  Body mass index is 27.05 kg/(m^2).    Intake/Output Summary (Last 24 hours) at 01/19/17 2239  Last data filed at 01/19/17 1600   Gross per 24 hour   Intake              900 ml   Output             1100 ml   Net             -200 ml      Physical Exam   Constitutional: He is oriented to person, place, and time. He appears well-developed and well-nourished. No distress.   Musculoskeletal: He exhibits no edema.   Right foot dressed   Neurological: He is alert and oriented to person, place, and time.   Nursing note and vitals reviewed.      Significant Labs:   CBC:   Recent Labs  Lab 01/18/17  0445   WBC 6.46   HGB 10.3*   HCT 32.8*        CMP:   Recent Labs  Lab 01/18/17  0445      K 3.6      CO2 31*   GLU 72   BUN 8   CREATININE 1.0   CALCIUM 9.1   ANIONGAP 7*   EGFRNONAA >60       Significant Imaging: I have reviewed all pertinent imaging results/findings within the past 24 hours.   X-Ray Foot Complete Right 1/19/17: Examination limited by overlying walking boot.  Postsurgical changes consistent with great toe amputation to the level of the proximal first metatarsal.  Remote changes consistent with amputation through the mid fifth metatarsal.  Persistent lateral deviation of the second through fourth toes.  Skin staples and small amount of postoperative air visualized at  the great toe amputation site.  No definite complications seen.    AMPUTATION-TOE and Tendo achilles lengthening (Right), Deep Bone Biopsy Right foot 1/19/17: Surgeon: CHECO RamiresM Primary; [facility]CO AbdallaM PGY-3 First assist

## 2017-01-20 NOTE — PT/OT/SLP EVAL
Physical Therapy  Evaluation    Indio Ryder Jr.   MRN: [id number]   Admitting Diagnosis: Subacute osteomyelitis of right foot    PT Received On: 01/20/17  PT Start Time: 0930     PT Stop Time: 1020    PT Total Time (min): 50 min       Billable Minutes:  Evaluation 12, Gait Pedsxtpe53 and Therapeutic Activity 13    Diagnosis: Subacute osteomyelitis of right foot  S/p R 5th toe amp 6/16/16 and 1/19/17 1st toe amp and Achilles lengthening procedure    Past Medical History   Diagnosis Date    Diabetes mellitus type II     Diabetic foot ulcer     Hyperlipidemia     Hypertension     Peripheral neuropathy     Subacute osteomyelitis of right foot 1/12/2017     Streptococcus agalactiae, Actinomyces odontolyticus    Ulcerative colitis, unspecified       Past Surgical History   Procedure Laterality Date    Toe amputation Right 06/05/2015    Toe amputation Right 01/19/2017       General Precautions: Standard, fall  Orthopedic Precautions: RLE non weight bearing   Braces:  (post op boot with R foot ACE wrapped bandaged foot in foot/ankle boot)       Do you have any cultural, spiritual, Orthodox conflicts, given your current situation?: none reported    Patient History:  Lives With: alone  Living Arrangements: house  Home Accessibility: stairs to enter home  Home Layout: Able to live on 1st floor  Number of Stairs to Enter Home: 4  Stair Railings at Home: outside, present on right side  Living Environment Comment: Pt will discharge to his mother's home initially which is a [facility] with 4 EARLINE with R rail.  Walk in shower (pt states he has shower chair can borrow from family member for few days PRN).  Pt usually lives alone in [facility] [facility] with no EARLINE with walk in shower.  Pt independent active lifestyle walking and driving with no AD.  Equipment Currently Used at Home: none  DME owned (not currently used): none    Previous Level of Function:  Ambulation Skills: independent  Transfer Skills: independent  ADL Skills:  "independent  Work/Leisure Activity: independent (+ , + works on feet inventory w trucks)    Subjective:  Communicated with nsg prior to session.    Pt reports he has used axillary crutches NWB a couple of years ago but does not currently have any.  Pt reports his niece has "scooter" describing a walker/scooter that you may rest a knee on while ambulating that he could borrow.  Ed provided for using knee for wt bearing depending on sensory issues/skin safety if using this equipment, also provided ed for proper fit for ht adjustment and safety considerations if pt does go fwd and use equipment.  Chief Complaint: pt reporting hot after session, no reports of nausea, pain, or dizziness  Patient goals: Return home and have independence    Pain Ratin/10               Pain Rating Post-Intervention: 0/10    Objective:         Cognitive Exam:  Oriented to: Person, place, time, situation    Follows Commands/attention: Follows one-step commands  Communication: clear/fluent  Safety awareness/insight to disability: intact    Physical Exam:  Postural examination/scapula alignment: Rounded shoulder and Head forward    Skin integrity: Visible skin intact and Wound wrapped    Sensation:   Impaired BLE    Upper Extremity Range of Motion:  Right Upper Extremity: WFL  Left Upper Extremity: WFL    Upper Extremity Strength:  Right Upper Extremity: WFL  Left Upper Extremity: WFL    Lower Extremity Range of Motion:  Right Lower Extremity: WFL except boot on R LE  Left Lower Extremity: WFL    Lower Extremity Strength:  Right Lower Extremity: WFL  Left Lower Extremity: WFL       Functional Mobility:  Bed Mobility:  Rolling/Turning to Left: Independent  Rolling/Turning Right: Independent  Scooting/Bridging: Independent  Supine to Sit: Independent, Modified Independent  Sit to Supine: Independent, Modified Independent    Transfers:  Sit <> Stand Assistance: Minimum Assistance  Sit <> Stand Assistive Device: Axillary crutches, Rolling " Walker  Bed <> Chair Technique: Stand Pivot  Bed <> Chair Assistance: Minimum Assistance, Contact Guard Assistance  Bed <> Chair Assistive Device: Axillary Crutches  Toilet Transfer Technique: Stand Pivot  Toilet Transfer Assistance: Stand By Assistance  Toilet Transfer Assistive Device: Axillary Crutches (pt using wall grab bar and wall support during txf, min assist for toile seat > stand txf (low seat/tall pt))    Gait:   Gait Distance: pt amb few feet with RW CGA w tendency to put R foot on ground, pt amb with axillary crutches RLE NWB with CGA and min assist with initial change in direction and 360' turns, pt improved during course of treatment with balance and performance of ambulation using axillary crutches, mult reps ~120', 40', 5'  Assistance 1: Contact Guard Assistance, Minimum assistance  Gait Assistive Device: Axillary crutches  Gait Pattern: non-weightbearing  Gait Deviation(s): forward lean (ed provided for safe ax crutch use for size/fit, positioning, and upright posture, safety with use)    Stairs: verbal and demo ed provided for negotiating up/down steps    Balance:   Static Sit: GOOD: Takes MODERATE challenges from all directions  Dynamic Sit: GOOD: Maintains balance through MODERATE excursions of active trunk movement  Static Stand: FAIR:  Needs CGA with ax crutches  Dynamic stand: FAIR: Needs CONTACT GUARD during gait w axillary crutches    Therapeutic Activities and Exercises:  Safety ed provided for functional mobility tasks with RLE NWB and various AD use,     AM-PAC 6 CLICK MOBILITY  How much help from another person does this patient currently need?   1 = Unable, Total/Dependent Assistance  2 = A lot, Maximum/Moderate Assistance  3 = A little, Minimum/Contact Guard/Supervision  4 = None, Modified [place]/Independent    Turning over in bed (including adjusting bedclothes, sheets and blankets)?: 4  Sitting down on and standing up from a chair with arms (e.g., wheelchair, bedside commode,  etc.): 3  Moving from lying on back to sitting on the side of the bed?: 4  Moving to and from a bed to a chair (including a wheelchair)?: 3  Need to walk in hospital room?: 3  Climbing 3-5 steps with a railing?: 3  Total Score: 20     AM-PAC Raw Score CMS G-Code Modifier Level of Impairment Assistance   6 % Total / Unable   7 - 9 CM 80 - 100% Maximal Assist   10 - 14 CL 60 - 80% Moderate Assist   15 - 19 CK 40 - 60% Moderate Assist   20 - 22 CJ 20 - 40% Minimal Assist   23 CI 1-20% SBA / CGA   24 CH 0% Independent/ Mod I     Patient left supine with all lines intact and call button in reach and nsg informed. Pt sweating after session and reported hot in room so adjusted room thermostat and reported to nsg.     Assessment:   Indio Ryder Jr. is a 46 y.o. male with a medical diagnosis of Subacute osteomyelitis of right foot and presents with deficits of independence with functional mobility tasks primarily noted with txfs and ambulation as pt is currently NWB on RLE.  Recommend continued gait training until pt discharges to home setting with family support.  Continue assessment for discharge needs as pt progresses.    Rehab identified problem list/impairments: Rehab identified problem list/impairments: gait instability, impaired endurance, impaired balance, impaired sensation, impaired functional mobilty, impaired skin, orthopedic precautions, impaired muscle length, decreased lower extremity function    Rehab potential is excellent.    Activity tolerance: Excellent    Discharge recommendations: Discharge Facility/Level Of Care Needs: home (Ongoing assessment.  At current state, would recommend HHPT but believe with 1-2 more sessions pt will be able to return home with family support)     Barriers to discharge: Barriers to Discharge: None    Equipment recommendations: Equipment Needed After Discharge: crutches (provided pt with axillary crutches)     GOALS:   Physical Therapy Goals        Problem:  Physical Therapy Goal    Goal Priority Disciplines Outcome Goal Variances Interventions   Physical Therapy Goal     PT/OT, PT Ongoing (interventions implemented as appropriate)     Description:  Goals to be met by: 2/3/17     Patient will increase functional independence with mobility by performin. Sit to stand transfer with Modified [place] w axillary crutches.  2.  Amb >125' with axillary crutches maintaining RLE NWB status with SVN/modified independence.  3.  Negotiate up/down 4 steps with ax crutches RLE NWB with CGA/SBA    Ongoing assessment of discharge recs.  Most likely home with family support.                PLAN:    Patient to be seen daily to address the above listed problems via gait training, therapeutic activities  Plan of Care expires: 17  Plan of Care reviewed with: patient          Amanda Savannah, PT  2017

## 2017-01-21 LAB
POCT GLUCOSE: 149 MG/DL (ref 70–110)
POCT GLUCOSE: 212 MG/DL (ref 70–110)
POCT GLUCOSE: 225 MG/DL (ref 70–110)
POCT GLUCOSE: 84 MG/DL (ref 70–110)

## 2017-01-21 PROCEDURE — 63600175 PHARM REV CODE 636 W HCPCS: Performed by: NURSE PRACTITIONER

## 2017-01-21 PROCEDURE — 94761 N-INVAS EAR/PLS OXIMETRY MLT: CPT

## 2017-01-21 PROCEDURE — 11000001 HC ACUTE MED/SURG PRIVATE ROOM

## 2017-01-21 PROCEDURE — 97116 GAIT TRAINING THERAPY: CPT

## 2017-01-21 PROCEDURE — 63600175 PHARM REV CODE 636 W HCPCS: Performed by: HOSPITALIST

## 2017-01-21 PROCEDURE — 25000003 PHARM REV CODE 250: Performed by: INTERNAL MEDICINE

## 2017-01-21 PROCEDURE — 97110 THERAPEUTIC EXERCISES: CPT

## 2017-01-21 PROCEDURE — 25000003 PHARM REV CODE 250: Performed by: NURSE PRACTITIONER

## 2017-01-21 RX ORDER — FLUCONAZOLE 200 MG/1
800 TABLET ORAL DAILY
Status: DISCONTINUED | OUTPATIENT
Start: 2017-01-21 | End: 2017-01-23 | Stop reason: HOSPADM

## 2017-01-21 RX ORDER — FLUCONAZOLE 2 MG/ML
6 INJECTION, SOLUTION INTRAVENOUS
Status: DISCONTINUED | OUTPATIENT
Start: 2017-01-21 | End: 2017-01-21

## 2017-01-21 RX ADMIN — ENOXAPARIN SODIUM 40 MG: 100 INJECTION SUBCUTANEOUS at 12:01

## 2017-01-21 RX ADMIN — PIPERACILLIN SODIUM AND TAZOBACTAM SODIUM 4.5 G: 4; .5 INJECTION, POWDER, LYOPHILIZED, FOR SOLUTION INTRAVENOUS at 09:01

## 2017-01-21 RX ADMIN — MICONAZOLE NITRATE: 20 CREAM TOPICAL at 08:01

## 2017-01-21 RX ADMIN — PIPERACILLIN SODIUM AND TAZOBACTAM SODIUM 4.5 G: 4; .5 INJECTION, POWDER, LYOPHILIZED, FOR SOLUTION INTRAVENOUS at 12:01

## 2017-01-21 RX ADMIN — PIPERACILLIN SODIUM AND TAZOBACTAM SODIUM 4.5 G: 4; .5 INJECTION, POWDER, LYOPHILIZED, FOR SOLUTION INTRAVENOUS at 05:01

## 2017-01-21 RX ADMIN — MICONAZOLE NITRATE: 20 CREAM TOPICAL at 09:01

## 2017-01-21 RX ADMIN — INSULIN DETEMIR 15 UNITS: 100 INJECTION, SOLUTION SUBCUTANEOUS at 08:01

## 2017-01-21 RX ADMIN — FLUCONAZOLE 800 MG: 200 TABLET ORAL at 05:01

## 2017-01-21 RX ADMIN — INSULIN ASPART 4 UNITS: 100 INJECTION, SOLUTION INTRAVENOUS; SUBCUTANEOUS at 05:01

## 2017-01-21 RX ADMIN — INSULIN ASPART 4 UNITS: 100 INJECTION, SOLUTION INTRAVENOUS; SUBCUTANEOUS at 08:01

## 2017-01-21 RX ADMIN — INSULIN ASPART 8 UNITS: 100 INJECTION, SOLUTION INTRAVENOUS; SUBCUTANEOUS at 08:01

## 2017-01-21 RX ADMIN — INSULIN ASPART 8 UNITS: 100 INJECTION, SOLUTION INTRAVENOUS; SUBCUTANEOUS at 05:01

## 2017-01-21 NOTE — ASSESSMENT & PLAN NOTE
45 y/o male with DM II and right foot ulcerations - history of longstanding ulcerations of this foot over 3 years -     Patient placed on vanco and zosyn empirically; now on piptazo to cover Strep agalactiae and Actinomyces in superficial cultures. Bone cultures with candida species only so far    Rec:  --continue piptazo for now and adjust with surgical cultures from 1/19  --start PO fluconazole 800mg daily PO  --might be able to use PO amox, flagyl and fluconazole for OP Rx.

## 2017-01-21 NOTE — PROGRESS NOTES
[facility]  Infectious Disease  Progress Note    Patient Name: Indio Ryder Jr.  MRN: [id number]  Admission Date: 1/12/2017  Length of Stay: 9 days  Attending Physician: Bharathi Terrazas MD  Primary Care Provider: Lorena Thakur MD    Isolation Status: No active isolations  Assessment/Plan:      * Subacute osteomyelitis of right foot  45 y/o male with DM II and right foot ulcerations - history of longstanding ulcerations of this foot over 3 years -     Patient placed on vanco and zosyn empirically; now on piptazo to cover Strep agalactiae and Actinomyces in superficial cultures. Bone cultures with candida species only so far    Rec:  --continue piptazo for now and adjust with surgical cultures from 1/19  --start PO fluconazole 800mg daily PO  --might be able to use PO amox, flagyl and fluconazole for OP Rx.            Anticipated Disposition: home    Thank you for your consult. I will follow-up with patient. Please contact us if you have any additional questions.    Subjective:     Principal Problem:Subacute osteomyelitis of right foot    Interval History: patient without new complaints; foot improving    HPI:  45 y/o male with HTN, DM II, with peripheral neuropathy. Patient has had diabetic ulcer of right foot followed by podiatry as outpatient (Dr. William Manuel) up until October when patient lost insurance benefits. Patient was doing own wound care a few times a week. He noticed malodorous drainage and swelling and erythema in December prior to ann. Patient regained insurance Jan 1, 2017 and went to podiatry on 1/10 and he was instructed to come to the ED. Patient presented to ED 1/12/17 - x ray of right foot concerning for osteo of 1st and 5th MT and cellulitis. Patient was given 1L NS and vanco and ceftriaxone in ED and admitted to hospital medicine. ED reports patient has history of 3 years of ongoing ulcer to right foot. MRI of foot ordered. Podiatry consulted. Patient placed  on vanco and zosyn on admit.     Patient stated he had been on po cipro and flagyl for about 6 months until October. He had a few antibiotic pills left and he took them recently when he started to have drainage from right foot PTA. Patient does not recall last tetanus shot.     1/12 right foot culture - positive for group B strep  1/14 vancomycin discontinued, zosyn continued  1/15 Podiatry performed I + D right great toe  1/19 Surgery performed:  Right great toe amputation with primary closure. Percutaneous tendo achilles lengthening. Deep bone biopsy of remaining first metatarsal base.      Review of Systems   All other systems reviewed and are negative.    Objective:     Vital Signs (Most Recent):  Temp: 98.1 °F (36.7 °C) (01/21/17 1200)  Pulse: 90 (01/21/17 1200)  Resp: 18 (01/21/17 1200)  BP: (!) 156/94 (01/21/17 1200)  SpO2: 98 % (01/21/17 1525) Vital Signs (24h Range):  Temp:  [98 °F (36.7 °C)-99 °F (37.2 °C)] 98.1 °F (36.7 °C)  Pulse:  [] 90  Resp:  [18-20] 18  SpO2:  [96 %-99 %] 98 %  BP: (133-163)/(81-94) 156/94     Weight: 93 kg (205 lb)  Body mass index is 27.05 kg/(m^2).    Estimated Creatinine Clearance: 102.1 mL/min (based on Cr of 1).    Physical Exam   Constitutional: He is oriented to person, place, and time. He appears well-developed and well-nourished. No distress.   HENT:   Head: Normocephalic and atraumatic.   Mouth/Throat: Oropharynx is clear and moist.   Poor dentition   Eyes: Conjunctivae and EOM are normal. Pupils are equal, round, and reactive to light.   Neck: Normal range of motion. Neck supple. No JVD present.   Cardiovascular: Normal rate, regular rhythm and intact distal pulses.    S4   Pulmonary/Chest: Effort normal and breath sounds normal.   Abdominal: Soft. Bowel sounds are normal. He exhibits no distension and no mass. There is no tenderness.   Genitourinary:   Genitourinary Comments: No uriarte   Musculoskeletal:   UE with PIV no lesions; right foot photos noted; amp site is  clean and dry; no cellulitis; left foot OK with hammer toes   Lymphadenopathy:     He has no cervical adenopathy.   Neurological: He is alert and oriented to person, place, and time. He exhibits normal muscle tone. Coordination normal.   Skin: Skin is warm and dry. No rash noted.   As above in terms of foot   Psychiatric:   Appropriate and cooperative   Nursing note and vitals reviewed.      Significant Labs:   BMP:   Recent Labs  Lab 01/20/17  0535   *      K 4.0      CO2 29   BUN 9   CREATININE 1.0   CALCIUM 8.8   MG 1.6     CBC:   Recent Labs  Lab 01/20/17  0535   WBC 6.68   HGB 10.1*   HCT 32.3*        Cultures  Bone 1/19/17 Candida albicans and parapsilosis; anaerobes pending; no other bacteria    All pertinent labs within the past 24 hours have been reviewed.    Significant Imaging: I have reviewed all pertinent imaging results/findings within the past 24 hours.        Av Gregorio MD  Infectious Disease  [facility]

## 2017-01-21 NOTE — SUBJECTIVE & OBJECTIVE
Interval History: Says that he is feeling fine today. No significant pain in the foot. Eating well. Had a BM.     Review of Systems   Constitutional: Negative for chills and fever.   Respiratory: Negative for cough and shortness of breath.    Cardiovascular: Negative for chest pain.   Gastrointestinal: Negative for nausea and vomiting.     Objective:     Vital Signs (Most Recent):  Temp: 98.1 °F (36.7 °C) (01/21/17 1200)  Pulse: 90 (01/21/17 1200)  Resp: 18 (01/21/17 1200)  BP: (!) 156/94 (01/21/17 1200)  SpO2: 98 % (01/21/17 1525) Vital Signs (24h Range):  Temp:  [98 °F (36.7 °C)-99 °F (37.2 °C)] 98.1 °F (36.7 °C)  Pulse:  [] 90  Resp:  [18-20] 18  SpO2:  [96 %-99 %] 98 %  BP: (133-163)/(81-94) 156/94     Weight: 93 kg (205 lb)  Body mass index is 27.05 kg/(m^2).    Intake/Output Summary (Last 24 hours) at 01/21/17 1555  Last data filed at 01/21/17 0600   Gross per 24 hour   Intake              800 ml   Output             2550 ml   Net            -1750 ml      Physical Exam   Constitutional: He is oriented to person, place, and time. He appears well-developed and well-nourished. No distress.   Cardiovascular: Normal rate and regular rhythm.    Pulmonary/Chest: Effort normal and breath sounds normal.   Abdominal: Soft. Bowel sounds are normal. He exhibits no distension.   Musculoskeletal: He exhibits no edema.   Right foot wrapped   Neurological: He is alert and oriented to person, place, and time.   Nursing note and vitals reviewed.      Significant Labs:   CBC:   Recent Labs  Lab 01/20/17  0535   WBC 6.68   HGB 10.1*   HCT 32.3*        CMP:   Recent Labs  Lab 01/20/17  0535      K 4.0      CO2 29   *   BUN 9   CREATININE 1.0   CALCIUM 8.8   ANIONGAP 7*   EGFRNONAA >60     POCT Glucose:   Recent Labs  Lab 01/20/17  2049 01/21/17  0611 01/21/17  1126   POCTGLUCOSE 242* 225* 84       Significant Imaging: I have reviewed all pertinent imaging results/findings within the past 24 hours.

## 2017-01-21 NOTE — ASSESSMENT & PLAN NOTE
Diabetic ulcer of right foot associated with type 2 diabetes mellitus/GBS infection    S/p resection of the first metatarsal head and base of the proximal phalanx, and I&D of the hallux at the IPJ level, with partial closure with retention sutures and iodoform packing. S/p toe amputation and Achilles tendon lengthening. Continue piperacillin-tazobactam per ID. Appreciate Infectious Disease and Podiatry. OR cultures not with yeast, so starting fluconazole per ID. Continue to follow OR cultures.

## 2017-01-21 NOTE — PLAN OF CARE
Problem: Patient Care Overview  Goal: Plan of Care Review  Outcome: Ongoing (interventions implemented as appropriate)  Plan of care reviewed with pt. Pt verbalizes understanding. Bed in lowest position, side rails up times 2, wheels locked, nonslip socks on, and bed alarm on. Call bell w/in reach. Instructed to call for needs/assist oob. Right lower extremity elevated while in bed. No c/o pain t/o night. Iv antibiotics continued per md order. Afebrile t/o night. Will continue to monitor.

## 2017-01-21 NOTE — PROGRESS NOTES
"[facility] Hospital Medicine  Progress Note    Patient Name: Indio Ryder Jr.  MRN: [id number]  Patient Class: IP- Inpatient   Admission Date: 1/12/2017  Length of Stay: 9 days  Attending Physician: Bharathi Terrazas MD  Primary Care Provider: Lorena Thakur MD        Subjective:     Principal Problem:Subacute osteomyelitis of right foot    HPI:  Indio Ryder Jr. Is a 46 y.o.  man with hypertension and diabetes mellitus type 2 with peripheral neuropathy, s/p 5th toe ray amputation on 6/05/15. He lives in [place], [place]. He is single. He works in [employer] at [employer]. His primary care physician is Dr. Lorena Thakur. His podiatrist is Dr. William Manuel.    He lost his insurance and was unable to continue following up with Dr. Manuel in October 2016. He had taken ciprofloxacin and metronidazole for 6 months up until then. He was performing wound care himself at home "a couple of times per week". He noticed malodorous drainage, redness and swelling of his right foot prior to Marc, accompanied by fever, chills, and fatigue. He regained insurance beginning January 1, 2017.  Dr. Manuel saw him in his clinic on 1/10/17 and instructed him to go to an ED for IV antibiotics and to have "Dr. Matthew take a look at it". Dr. Matthew is a general surgeon at [facility]. He went to [facility], where there are also podiatrists, on 1/12/17.    He was afebrile, but pulse was 110s. WBC count was 9520 and hemoglobin and hematocrit were 11.8 and 37. ESR was 113, CRP was 19.6. He was hyperglycemic (glucose 421). X-ray of the right foot showed cellulitis and osteomyelitis of the right 1st and 5th metatarsals. He was given 1 liter normal saline, ceftriaxone, and vancomycin in the ED. He was admitted to [facility].       Hospital Course:  Vancomycin was continued and ceftriaxone changed to piperacillin-tazobactam. Podiatry was " consulted and performed bedside debridement and culture. He reported that he had been hyperglycemic recently due to the infection. Hemoglobin A1c was 15%. MRI showed osteomyelitis of most of the 1st metatarsal and the phalanges. Streptococcus agalactiae grew in bedside wound culture. Scheduled insulin doses were increased much higher than home doses to treat hyperglycemia. On 1/16/17 Dr. Ramirez Wilkes performed resection of the first metatarsal head and base of the proximal phalanx, and I&D of the hallux at the IPJ level, with partial closure with retention sutures and iodoform packing, as part one of a staged procedure. Piperacillin-tazobactam was continued to cover group B Streptococcus and Actinomyces odontolyticus per Infectious Disease. He returned to the OR on 1/19/17 for the second part of the procedure including amputation of the right great toe and Achilles tendon lengthening.     Interval History: Says that he is feeling fine today. No significant pain in the foot. Eating well. Had a BM.     Review of Systems   Constitutional: Negative for chills and fever.   Respiratory: Negative for cough and shortness of breath.    Cardiovascular: Negative for chest pain.   Gastrointestinal: Negative for nausea and vomiting.     Objective:     Vital Signs (Most Recent):  Temp: 98.1 °F (36.7 °C) (01/21/17 1200)  Pulse: 90 (01/21/17 1200)  Resp: 18 (01/21/17 1200)  BP: (!) 156/94 (01/21/17 1200)  SpO2: 98 % (01/21/17 1525) Vital Signs (24h Range):  Temp:  [98 °F (36.7 °C)-99 °F (37.2 °C)] 98.1 °F (36.7 °C)  Pulse:  [] 90  Resp:  [18-20] 18  SpO2:  [96 %-99 %] 98 %  BP: (133-163)/(81-94) 156/94     Weight: 93 kg (205 lb)  Body mass index is 27.05 kg/(m^2).    Intake/Output Summary (Last 24 hours) at 01/21/17 1555  Last data filed at 01/21/17 0600   Gross per 24 hour   Intake              800 ml   Output             2550 ml   Net            -1750 ml      Physical Exam   Constitutional: He is oriented to person, place,  and time. He appears well-developed and well-nourished. No distress.   Cardiovascular: Normal rate and regular rhythm.    Pulmonary/Chest: Effort normal and breath sounds normal.   Abdominal: Soft. Bowel sounds are normal. He exhibits no distension.   Musculoskeletal: He exhibits no edema.   Right foot wrapped   Neurological: He is alert and oriented to person, place, and time.   Nursing note and vitals reviewed.      Significant Labs:   CBC:   Recent Labs  Lab 01/20/17  0535   WBC 6.68   HGB 10.1*   HCT 32.3*        CMP:   Recent Labs  Lab 01/20/17  0535      K 4.0      CO2 29   *   BUN 9   CREATININE 1.0   CALCIUM 8.8   ANIONGAP 7*   EGFRNONAA >60     POCT Glucose:   Recent Labs  Lab 01/20/17  2049 01/21/17  0611 01/21/17  1126   POCTGLUCOSE 242* 225* 84       Significant Imaging: I have reviewed all pertinent imaging results/findings within the past 24 hours.    Assessment/Plan:      * Subacute osteomyelitis of right foot  Diabetic ulcer of right foot associated with type 2 diabetes mellitus/GBS infection    S/p resection of the first metatarsal head and base of the proximal phalanx, and I&D of the hallux at the IPJ level, with partial closure with retention sutures and iodoform packing. S/p toe amputation and Achilles tendon lengthening. Continue piperacillin-tazobactam per ID. Appreciate Infectious Disease and Podiatry. OR cultures not with yeast, so starting fluconazole per ID. Continue to follow OR cultures.       Essential hypertension  Not on medications. SBP ranged 133 to 163.       Type 2 diabetes mellitus with diabetic neuropathy, with long-term current use of insulin  Takes metformin 500 mg BID and insulin glargine 25 units HS at home. Hemoglobin A1c 15%. Giving insulin detemir 15 units am 25 units HS, insulin aspart 8 units TID with meals, moderate dose insulin sliding scale. BS ranged 90 to 353.       VTE Risk Mitigation         Ordered     enoxaparin injection 40 mg  Daily      Route:  Subcutaneous        01/12/17 1239     Medium Risk of VTE  Once      01/12/17 1239          Bharathi Terrazas MD  Department of Hospital Medicine   [facility]

## 2017-01-21 NOTE — SUBJECTIVE & OBJECTIVE
Interval History: patient without new complaints; foot improving    HPI:  45 y/o male with HTN, DM II, with peripheral neuropathy. Patient has had diabetic ulcer of right foot followed by podiatry as outpatient (Dr. William Manuel) up until October when patient lost insurance benefits. Patient was doing own wound care a few times a week. He noticed malodorous drainage and swelling and erythema in December prior to ann. Patient regained insurance Jan 1, 2017 and went to podiatry on 1/10 and he was instructed to come to the ED. Patient presented to ED 1/12/17 - x ray of right foot concerning for osteo of 1st and 5th MT and cellulitis. Patient was given 1L NS and vanco and ceftriaxone in ED and admitted to hospital medicine. ED reports patient has history of 3 years of ongoing ulcer to right foot. MRI of foot ordered. Podiatry consulted. Patient placed on vanco and zosyn on admit.     Patient stated he had been on po cipro and flagyl for about 6 months until October. He had a few antibiotic pills left and he took them recently when he started to have drainage from right foot PTA. Patient does not recall last tetanus shot.     1/12 right foot culture - positive for group B strep  1/14 vancomycin discontinued, zosyn continued  1/15 Podiatry performed I + D right great toe  1/19 Surgery performed:  Right great toe amputation with primary closure. Percutaneous tendo achilles lengthening. Deep bone biopsy of remaining first metatarsal base.      Review of Systems   All other systems reviewed and are negative.    Objective:     Vital Signs (Most Recent):  Temp: 98.1 °F (36.7 °C) (01/21/17 1200)  Pulse: 90 (01/21/17 1200)  Resp: 18 (01/21/17 1200)  BP: (!) 156/94 (01/21/17 1200)  SpO2: 98 % (01/21/17 1525) Vital Signs (24h Range):  Temp:  [98 °F (36.7 °C)-99 °F (37.2 °C)] 98.1 °F (36.7 °C)  Pulse:  [] 90  Resp:  [18-20] 18  SpO2:  [96 %-99 %] 98 %  BP: (133-163)/(81-94) 156/94     Weight: 93 kg (205 lb)  Body mass  index is 27.05 kg/(m^2).    Estimated Creatinine Clearance: 102.1 mL/min (based on Cr of 1).    Physical Exam   Constitutional: He is oriented to person, place, and time. He appears well-developed and well-nourished. No distress.   HENT:   Head: Normocephalic and atraumatic.   Mouth/Throat: Oropharynx is clear and moist.   Poor dentition   Eyes: Conjunctivae and EOM are normal. Pupils are equal, round, and reactive to light.   Neck: Normal range of motion. Neck supple. No JVD present.   Cardiovascular: Normal rate, regular rhythm and intact distal pulses.    S4   Pulmonary/Chest: Effort normal and breath sounds normal.   Abdominal: Soft. Bowel sounds are normal. He exhibits no distension and no mass. There is no tenderness.   Genitourinary:   Genitourinary Comments: No uriarte   Musculoskeletal:   UE with PIV no lesions; right foot photos noted; amp site is clean and dry; no cellulitis; left foot OK with hammer toes   Lymphadenopathy:     He has no cervical adenopathy.   Neurological: He is alert and oriented to person, place, and time. He exhibits normal muscle tone. Coordination normal.   Skin: Skin is warm and dry. No rash noted.   As above in terms of foot   Psychiatric:   Appropriate and cooperative   Nursing note and vitals reviewed.      Significant Labs:   BMP:   Recent Labs  Lab 01/20/17  0535   *      K 4.0      CO2 29   BUN 9   CREATININE 1.0   CALCIUM 8.8   MG 1.6     CBC:   Recent Labs  Lab 01/20/17  0535   WBC 6.68   HGB 10.1*   HCT 32.3*        Cultures  Bone 1/19/17 Candida albicans and parapsilosis; anaerobes pending; no other bacteria    All pertinent labs within the past 24 hours have been reviewed.    Significant Imaging: I have reviewed all pertinent imaging results/findings within the past 24 hours.

## 2017-01-21 NOTE — PLAN OF CARE
Problem: Patient Care Overview  Goal: Plan of Care Review  Outcome: Revised  Pt receiving IV abx for infection. Pt has had no complaints of pain. DM being managed with medication and diet. Dressing intact. Will monitor and intervene when needed.

## 2017-01-21 NOTE — ASSESSMENT & PLAN NOTE
Takes metformin 500 mg BID and insulin glargine 25 units HS at home. Hemoglobin A1c 15%. Giving insulin detemir 15 units am 25 units HS, insulin aspart 8 units TID with meals, moderate dose insulin sliding scale. BS ranged 90 to 353.

## 2017-01-22 LAB
POCT GLUCOSE: 117 MG/DL (ref 70–110)
POCT GLUCOSE: 201 MG/DL (ref 70–110)
POCT GLUCOSE: 276 MG/DL (ref 70–110)
POCT GLUCOSE: 88 MG/DL (ref 70–110)

## 2017-01-22 PROCEDURE — 25000003 PHARM REV CODE 250: Performed by: NURSE PRACTITIONER

## 2017-01-22 PROCEDURE — 11000001 HC ACUTE MED/SURG PRIVATE ROOM

## 2017-01-22 PROCEDURE — 97116 GAIT TRAINING THERAPY: CPT

## 2017-01-22 PROCEDURE — 25000003 PHARM REV CODE 250: Performed by: PHYSICIAN ASSISTANT

## 2017-01-22 PROCEDURE — 25000003 PHARM REV CODE 250: Performed by: INTERNAL MEDICINE

## 2017-01-22 PROCEDURE — 94761 N-INVAS EAR/PLS OXIMETRY MLT: CPT

## 2017-01-22 PROCEDURE — 63600175 PHARM REV CODE 636 W HCPCS: Performed by: NURSE PRACTITIONER

## 2017-01-22 RX ORDER — AMOXICILLIN 250 MG/1
500 CAPSULE ORAL EVERY 8 HOURS
Status: DISCONTINUED | OUTPATIENT
Start: 2017-01-22 | End: 2017-01-23 | Stop reason: HOSPADM

## 2017-01-22 RX ORDER — METRONIDAZOLE 500 MG/1
500 TABLET ORAL EVERY 8 HOURS
Status: DISCONTINUED | OUTPATIENT
Start: 2017-01-22 | End: 2017-01-23 | Stop reason: HOSPADM

## 2017-01-22 RX ORDER — AMOXICILLIN 250 MG
1 CAPSULE ORAL 2 TIMES DAILY
Status: DISCONTINUED | OUTPATIENT
Start: 2017-01-22 | End: 2017-01-23 | Stop reason: HOSPADM

## 2017-01-22 RX ADMIN — INSULIN DETEMIR 15 UNITS: 100 INJECTION, SOLUTION SUBCUTANEOUS at 08:01

## 2017-01-22 RX ADMIN — STANDARDIZED SENNA CONCENTRATE AND DOCUSATE SODIUM 1 TABLET: 8.6; 5 TABLET, FILM COATED ORAL at 09:01

## 2017-01-22 RX ADMIN — ACETAMINOPHEN 650 MG: 325 TABLET ORAL at 12:01

## 2017-01-22 RX ADMIN — METRONIDAZOLE 500 MG: 500 TABLET ORAL at 03:01

## 2017-01-22 RX ADMIN — AMOXICILLIN 500 MG: 250 CAPSULE ORAL at 09:01

## 2017-01-22 RX ADMIN — METRONIDAZOLE 500 MG: 500 TABLET ORAL at 09:01

## 2017-01-22 RX ADMIN — INSULIN ASPART 6 UNITS: 100 INJECTION, SOLUTION INTRAVENOUS; SUBCUTANEOUS at 05:01

## 2017-01-22 RX ADMIN — ENOXAPARIN SODIUM 40 MG: 100 INJECTION SUBCUTANEOUS at 12:01

## 2017-01-22 RX ADMIN — PIPERACILLIN SODIUM AND TAZOBACTAM SODIUM 4.5 G: 4; .5 INJECTION, POWDER, LYOPHILIZED, FOR SOLUTION INTRAVENOUS at 05:01

## 2017-01-22 RX ADMIN — INSULIN ASPART 8 UNITS: 100 INJECTION, SOLUTION INTRAVENOUS; SUBCUTANEOUS at 08:01

## 2017-01-22 RX ADMIN — INSULIN ASPART 2 UNITS: 100 INJECTION, SOLUTION INTRAVENOUS; SUBCUTANEOUS at 09:01

## 2017-01-22 RX ADMIN — FLUCONAZOLE 800 MG: 200 TABLET ORAL at 08:01

## 2017-01-22 RX ADMIN — AMOXICILLIN 500 MG: 250 CAPSULE ORAL at 03:01

## 2017-01-22 RX ADMIN — PIPERACILLIN SODIUM AND TAZOBACTAM SODIUM 4.5 G: 4; .5 INJECTION, POWDER, LYOPHILIZED, FOR SOLUTION INTRAVENOUS at 12:01

## 2017-01-22 RX ADMIN — STANDARDIZED SENNA CONCENTRATE AND DOCUSATE SODIUM 1 TABLET: 8.6; 5 TABLET, FILM COATED ORAL at 08:01

## 2017-01-22 RX ADMIN — MICONAZOLE NITRATE: 20 CREAM TOPICAL at 09:01

## 2017-01-22 RX ADMIN — INSULIN ASPART 8 UNITS: 100 INJECTION, SOLUTION INTRAVENOUS; SUBCUTANEOUS at 05:01

## 2017-01-22 NOTE — PLAN OF CARE
Problem: Physical Therapy Goal  Goal: Physical Therapy Goal  Goals to be met by: 2/3/17     Patient will increase functional independence with mobility by performin. Sit to stand transfer with Modified [place] w axillary crutches.  2. Amb >125 with axillary crutches maintaining RLE NWB status with SVN/modified independence.  3. Negotiate up/down 4 steps with ax crutches RLE NWB with CGA/SBA    Ongoing assessment of discharge recs. Most likely home with family support.   Outcome: Ongoing (interventions implemented as appropriate)  Goals ongoing

## 2017-01-22 NOTE — PROGRESS NOTES
[facility]  Infectious Disease  Progress Note    Patient Name: Indio Ryder Jr.  MRN: [id number]  Admission Date: 1/12/2017  Length of Stay: 10 days  Attending Physician: Bharathi Terrazas MD  Primary Care Provider: Lorena Thakur MD    Isolation Status: No active isolations  Assessment/Plan:      * Subacute osteomyelitis of right foot  45 y/o male with DM II and right foot ulcerations - history of longstanding ulcerations of this foot over 3 years -     Patient placed on vanco and zosyn empirically; now on piptazo to cover Strep agalactiae and Actinomyces in superficial cultures. Bone cultures with candida species only so far. No aerobes cultured otherwise. Anaerobic culture tomorrow    Rec:  --discontinue piptazo  --cont PO fluconazole 800mg daily PO  --start PO amox, flagyl for OP Rx.            Anticipated Disposition: home    Thank you for your consult. I will sign off. Please contact us if you have any additional questions.    Subjective:     Principal Problem:Subacute osteomyelitis of right foot    Interval History: patient better without complaints; watching TV    HPI:  45 y/o male with HTN, DM II, with peripheral neuropathy. Patient has had diabetic ulcer of right foot followed by podiatry as outpatient (Dr. William Manuel) up until October when patient lost insurance benefits. Patient was doing own wound care a few times a week. He noticed malodorous drainage and swelling and erythema in December prior to christmas. Patient regained insurance Jan 1, 2017 and went to podiatry on 1/10 and he was instructed to come to the ED. Patient presented to ED 1/12/17 - x ray of right foot concerning for osteo of 1st and 5th MT and cellulitis. Patient was given 1L NS and vanco and ceftriaxone in ED and admitted to hospital medicine. ED reports patient has history of 3 years of ongoing ulcer to right foot. MRI of foot ordered. Podiatry consulted. Patient placed on vanco and zosyn on admit.      Patient stated he had been on po cipro and flagyl for about 6 months until October. He had a few antibiotic pills left and he took them recently when he started to have drainage from right foot PTA. Patient does not recall last tetanus shot.     1/12 right foot culture - positive for group B strep  1/14 vancomycin discontinued, zosyn continued  1/15 Podiatry performed I + D right great toe  1/19 Surgery performed:  Right great toe amputation with primary closure. Percutaneous tendo achilles lengthening. Deep bone biopsy of remaining first metatarsal base.      Review of Systems   All other systems reviewed and are negative.    Objective:     Vital Signs (Most Recent):  Temp: 98 °F (36.7 °C) (01/22/17 1200)  Pulse: 74 (01/22/17 1200)  Resp: 20 (01/22/17 1200)  BP: (!) 165/99 (01/22/17 1200)  SpO2: 100 % (01/22/17 1154) Vital Signs (24h Range):  Temp:  [97.6 °F (36.4 °C)-99 °F (37.2 °C)] 98 °F (36.7 °C)  Pulse:  [74-94] 74  Resp:  [18-20] 20  SpO2:  [97 %-100 %] 100 %  BP: (140-165)/(81-99) 165/99     Weight: 93 kg (205 lb)  Body mass index is 27.05 kg/(m^2).    Estimated Creatinine Clearance: 102.1 mL/min (based on Cr of 1).    Physical Exam   Constitutional: He is oriented to person, place, and time. He appears well-developed and well-nourished. No distress.   HENT:   Head: Normocephalic and atraumatic.   Mouth/Throat: Oropharynx is clear and moist.   Poor dentition   Eyes: Conjunctivae and EOM are normal. Pupils are equal, round, and reactive to light.   Neck: Normal range of motion. Neck supple. No JVD present.   Cardiovascular: Normal rate, regular rhythm and intact distal pulses.    S4   Pulmonary/Chest: Effort normal and breath sounds normal.   Abdominal: Soft. Bowel sounds are normal. He exhibits no distension and no mass. There is no tenderness.   Genitourinary:   Genitourinary Comments: No uriarte   Musculoskeletal:   UE with PIV no lesions; right foot photos noted; amp site is clean and dry; no  cellulitis; left foot OK with hammer toes   Lymphadenopathy:     He has no cervical adenopathy.   Neurological: He is alert and oriented to person, place, and time. He exhibits normal muscle tone. Coordination normal.   Skin: Skin is warm and dry. No rash noted.   As above in terms of foot   Psychiatric:   Appropriate and cooperative   Nursing note and vitals reviewed.      Significant Labs:   All pertinent labs within the past 24 hours have been reviewed.    Significant Imaging: I have reviewed all pertinent imaging results/findings within the past 24 hours.        Av Gregorio MD  Infectious Disease  [facility]

## 2017-01-22 NOTE — PLAN OF CARE
Problem: Physical Therapy Goal  Goal: Physical Therapy Goal  Goals to be met by: 2/3/17     Patient will increase functional independence with mobility by performin. Sit to stand transfer with Modified [place] w axillary crutches.  2. Amb >125 with axillary crutches maintaining RLE NWB status with SVN/modified independence.  3. Negotiate up/down 4 steps with ax crutches RLE NWB with CGA/SBA    Ongoing assessment of discharge recs. Most likely home with family support.   Outcome: Ongoing (interventions implemented as appropriate)   Continue working toward goals.

## 2017-01-22 NOTE — PROGRESS NOTES
[facility]  Infectious Disease  Progress Note    Patient Name: Indio Ryder Jr.  MRN: [id number]  Admission Date: 1/12/2017  Length of Stay: 10 days  Attending Physician: Bharathi Terrazas MD  Primary Care Provider: Lorena Thakur MD    Isolation Status: No active isolations  Assessment/Plan:      * Subacute osteomyelitis of right foot  45 y/o male with DM II and right foot ulcerations - history of longstanding ulcerations of this foot over 3 years -     Patient placed on vanco and zosyn empirically; now on piptazo to cover Strep agalactiae and Actinomyces in superficial cultures. Bone cultures with candida species only so far. No aerobes cultured otherwise. Anaerobic culture tomorrow    Rec:  --discontinue piptazo  --cont PO fluconazole 800mg daily PO  --start PO amox, flagyl for OP Rx.  --6 weeks for Rx and reassess based on response. Fluconazole might be longer given Candida bone infections care more difficult to treat  --follow up with podiatry  --weekly labs CBC CRP BMP            Anticipated Disposition: home    Thank you for your consult. I will sign off. Please contact us if you have any additional questions.    Subjective:     Principal Problem:Subacute osteomyelitis of right foot    No new subjective & objective note has been filed under this hospital service since the last note was generated.        Av Gregorio MD  Infectious Disease  [facility]

## 2017-01-22 NOTE — PT/OT/SLP PROGRESS
Physical Therapy  Treatment    Indio Ryder Jr.   MRN: [id number]   Admitting Diagnosis: Subacute osteomyelitis of right foot    PT Received On: 01/22/17  PT Start Time: 1408     PT Stop Time: 1420    PT Total Time (min): 12 min       Billable Minutes:  Gait [id number]    Treatment Type: Treatment  PT/PTA: PTA     PTA Visit Number: 2       General Precautions: Standard, fall  Orthopedic Precautions: RLE non weight bearing   Braces:      Do you have any cultural, spiritual, Baptist conflicts, given your current situation?: none reported    Subjective:  Communicated with nsg prior to session.      Pain Rating:  (no c/o's)                   Objective:        Functional Mobility:  Bed Mobility:        Transfers:  Sit <> Stand Assistance: Contact Guard Assistance  Sit <> Stand Assistive Device: Axillary crutches    Gait:   Gait Distance: ~300' X 1 with IV in tow  Assistance 1: Contact Guard Assistance  Gait Assistive Device: Axillary crutches  Gait Pattern: swing-through gait  Gait Deviation(s): forward lean    Stairs:      Balance:   Static Sit: GOOD: Takes MODERATE challenges from all directions  Dynamic Sit: GOOD: Maintains balance through MODERATE excursions of active trunk movement  Static Stand: FAIR: Maintains without assist but unable to take challenges  Dynamic stand: 0: N/A     Therapeutic Activities and Exercises:       AM-PAC 6 CLICK MOBILITY  How much help from another person does this patient currently need?   1 = Unable, Total/Dependent Assistance  2 = A lot, Maximum/Moderate Assistance  3 = A little, Minimum/Contact Guard/Supervision  4 = None, Modified [place]/Independent    Turning over in bed (including adjusting bedclothes, sheets and blankets)?: 4  Sitting down on and standing up from a chair with arms (e.g., wheelchair, bedside commode, etc.): 3  Moving from lying on back to sitting on the side of the bed?: 4  Moving to and from a bed to a chair (including a wheelchair)?: 3  Need to walk in  hospital room?: 3  Climbing 3-5 steps with a railing?: 3  Total Score: 20    AM-PAC Raw Score CMS G-Code Modifier Level of Impairment Assistance   6 % Total / Unable   7 - 9 CM 80 - 100% Maximal Assist   10 - 14 CL 60 - 80% Moderate Assist   15 - 19 CK 40 - 60% Moderate Assist   20 - 22 CJ 20 - 40% Minimal Assist   23 CI 1-20% SBA / CGA   24 CH 0% Independent/ Mod I     Patient left up in chair with all lines intact, call button in reach and nsg notified.    Assessment: tolerates NWB status w/o difficulty  Indio Ryder Jr. is a 46 y.o. male with a medical diagnosis of Subacute osteomyelitis of right foot and presents with .    Rehab identified problem list/impairments: Rehab identified problem list/impairments: impaired endurance, impaired functional mobilty, gait instability, impaired balance, orthopedic precautions    Rehab potential is excellent.    Activity tolerance: Good    Discharge recommendations: Discharge Facility/Level Of Care Needs: home     Barriers to discharge: Barriers to Discharge: None    Equipment recommendations: Equipment Needed After Discharge: shower chair     GOALS: see general POC  Physical Therapy Goals        Problem: Physical Therapy Goal    Goal Priority Disciplines Outcome Goal Variances Interventions   Physical Therapy Goal     PT/OT, PT Ongoing (interventions implemented as appropriate)     Description:  Goals to be met by: 2/3/17     Patient will increase functional independence with mobility by performin. Sit to stand transfer with Modified [place] w axillary crutches.  2.  Amb >125' with axillary crutches maintaining RLE NWB status with SVN/modified independence.  3.  Negotiate up/down 4 steps with ax crutches RLE NWB with CGA/SBA    Ongoing assessment of discharge recs.  Most likely home with family support.                PLAN:    Patient to be seen daily  to address the above listed problems via gait training, therapeutic activities  Plan of Care  expires: 02/03/17  Plan of Care reviewed with: patient         Zachary Fournier, PTA  01/22/2017

## 2017-01-22 NOTE — PT/OT/SLP PROGRESS
Physical Therapy  Treatment    Indio Ryder Jr.   MRN: [id number]   Admitting Diagnosis: Subacute osteomyelitis of right foot    PT Received On: 17  PT Start Time: 1330     PT Stop Time: 1355    PT Total Time (min): 25 min       Billable Minutes:  Gait [id number] and Therapeutic Exercise 10    Treatment Type: Treatment  PT/PTA: PTA     PTA Visit Number: 1       General Precautions: Standard, fall  Orthopedic Precautions: RLE non weight bearing   Braces:      Do you have any cultural, spiritual, Oriental orthodox conflicts, given your current situation?: none reported    Subjective:  Communicated with Rn (Amanda) prior to session.  Pt agreed to tx.    Pain Ratin/10              Pain Rating Post-Intervention: 0/10    Objective:        Functional Mobility:  Bed Mobility:   Supine to Sit: Modified Independent, Independent  Sit to Supine: Modified Independent, Independent    Transfers:  Sit <> Stand Assistance: Contact Guard Assistance (vc's for proper technique with crutches)  Sit <> Stand Assistive Device: Axillary crutches    Gait:   Gait Distance: 325ft with axillary crutches, NWB RLE, and CGA/Alan.  Pt demonstrated 3 bouts of unsteadiness with gait training.  Two of the bouts pt was able to self correct with CGA and one bout required Alan.  All LOB with pt leaning to the left.  Assistance 1: Contact Guard Assistance, Minimum assistance  Gait Assistive Device: Axillary crutches  Gait Pattern: swing-through gait (NWB RLE)  Gait Deviation(s): forward lean    Stairs:  Balance:   Static Sit: GOOD: Takes MODERATE challenges from all directions  Dynamic Sit: GOOD: Maintains balance through MODERATE excursions of active trunk movement  Static Stand: FAIR: Maintains without assist but unable to take challenges needs CGA with Axillary crutches  Dynamic stand: FAIR: Needs CONTACT GUARD during gait     Therapeutic Activities and Exercises:  Pt had CAM boot donned upon entering the room.  Seated AP (L only), LAQ, hip  flexion/ABD/ADD, and glut sets x 15 reps.   AMB: as above.  Pt maintained NWB RLE throughout gait training.  Vc's to slow tommy for improved balance secondary to LOBs happening with increased speed.     AM-PAC 6 CLICK MOBILITY  How much help from another person does this patient currently need?   1 = Unable, Total/Dependent Assistance  2 = A lot, Maximum/Moderate Assistance  3 = A little, Minimum/Contact Guard/Supervision  4 = None, Modified [place]/Independent    Turning over in bed (including adjusting bedclothes, sheets and blankets)?: 4  Sitting down on and standing up from a chair with arms (e.g., wheelchair, bedside commode, etc.): 3  Moving from lying on back to sitting on the side of the bed?: 4  Moving to and from a bed to a chair (including a wheelchair)?: 3  Need to walk in hospital room?: 3  Climbing 3-5 steps with a railing?: 3  Total Score: 20    AM-PAC Raw Score CMS G-Code Modifier Level of Impairment Assistance   6 % Total / Unable   7 - 9 CM 80 - 100% Maximal Assist   10 - 14 CL 60 - 80% Moderate Assist   15 - 19 CK 40 - 60% Moderate Assist   20 - 22 CJ 20 - 40% Minimal Assist   23 CI 1-20% SBA / CGA   24 CH 0% Independent/ Mod I     Patient left supine with all lines intact, call button in reach, bed alarm on and RN notified.    Assessment:  Indio Ryder Jr. is a 46 y.o. male with a medical diagnosis of Subacute osteomyelitis of right foot and presents with increased ambulation distance with unsteadiness and LOB noted as above.  Pt would benefit from continue P.T. To address impairments listed below.    Rehab identified problem list/impairments: Rehab identified problem list/impairments: impaired functional mobilty, impaired self care skills, gait instability, impaired balance, decreased lower extremity function, orthopedic precautions    Rehab potential is good.    Activity tolerance: Good    Discharge recommendations: Discharge Facility/Level Of Care Needs: home     Barriers  to discharge: Barriers to Discharge: None    Equipment recommendations: Equipment Needed After Discharge: crutches, axillary, shower chair     GOALS:   Physical Therapy Goals        Problem: Physical Therapy Goal    Goal Priority Disciplines Outcome Goal Variances Interventions   Physical Therapy Goal     PT/OT, PT Ongoing (interventions implemented as appropriate)     Description:  Goals to be met by: 2/3/17     Patient will increase functional independence with mobility by performin. Sit to stand transfer with Modified [place] w axillary crutches.  2.  Amb >125' with axillary crutches maintaining RLE NWB status with SVN/modified independence.  3.  Negotiate up/down 4 steps with ax crutches RLE NWB with CGA/SBA    Ongoing assessment of discharge recs.  Most likely home with family support.                PLAN:    Patient to be seen daily  to address the above listed problems via gait training, therapeutic activities  Plan of Care expires: 17  Plan of Care reviewed with: patient         Karlee Antony, PTA  2017

## 2017-01-22 NOTE — PLAN OF CARE
Problem: Patient Care Overview  Goal: Plan of Care Review  Room air SpO2   98%. Pt with no apparent distress noted. Will continue to monitor.

## 2017-01-22 NOTE — PLAN OF CARE
Problem: Patient Care Overview  Goal: Plan of Care Review  Outcome: Ongoing (interventions implemented as appropriate)  Pt ambulating in yu with pt/ot. Pt switched to po abx. Pt dressing changed by MD on 2/21/17. DM managed with diet and medications.

## 2017-01-22 NOTE — ASSESSMENT & PLAN NOTE
47 y/o male with DM II and right foot ulcerations - history of longstanding ulcerations of this foot over 3 years -     Patient placed on vanco and zosyn empirically; now on piptazo to cover Strep agalactiae and Actinomyces in superficial cultures. Bone cultures with candida species only so far. No aerobes cultured otherwise. Anaerobic culture tomorrow    Rec:  --discontinue piptazo  --cont PO fluconazole 800mg daily PO  --start PO amox, flagyl for OP Rx.

## 2017-01-22 NOTE — ASSESSMENT & PLAN NOTE
47 y/o male with DM II and right foot ulcerations - history of longstanding ulcerations of this foot over 3 years -     Patient placed on vanco and zosyn empirically; now on piptazo to cover Strep agalactiae and Actinomyces in superficial cultures. Bone cultures with candida species only so far. No aerobes cultured otherwise. Anaerobic culture tomorrow    Rec:  --discontinue piptazo  --cont PO fluconazole 800mg daily PO  --start PO amox, flagyl for OP Rx.  --6 weeks for Rx and reassess based on response. Fluconazole might be longer given Candida bone infections care more difficult to treat  --follow up with podiatry  --weekly labs CBC CRP BMP

## 2017-01-22 NOTE — PLAN OF CARE
Problem: Patient Care Overview  Goal: Plan of Care Review  Outcome: Ongoing (interventions implemented as appropriate)  No acute distress noted. No c/o pain. Patient verbalized understanding the plan of care. Fall precautions maintained. Bed in low and locked position, side rails up x 2, non skid socks applied, call light within reach. Continue to monitor

## 2017-01-22 NOTE — SUBJECTIVE & OBJECTIVE
Interval History: patient better without complaints; watching TV    HPI:  45 y/o male with HTN, DM II, with peripheral neuropathy. Patient has had diabetic ulcer of right foot followed by podiatry as outpatient (Dr. William Manuel) up until October when patient lost insurance benefits. Patient was doing own wound care a few times a week. He noticed malodorous drainage and swelling and erythema in December prior to ann. Patient regained insurance Jan 1, 2017 and went to podiatry on 1/10 and he was instructed to come to the ED. Patient presented to ED 1/12/17 - x ray of right foot concerning for osteo of 1st and 5th MT and cellulitis. Patient was given 1L NS and vanco and ceftriaxone in ED and admitted to hospital medicine. ED reports patient has history of 3 years of ongoing ulcer to right foot. MRI of foot ordered. Podiatry consulted. Patient placed on vanco and zosyn on admit.     Patient stated he had been on po cipro and flagyl for about 6 months until October. He had a few antibiotic pills left and he took them recently when he started to have drainage from right foot PTA. Patient does not recall last tetanus shot.     1/12 right foot culture - positive for group B strep  1/14 vancomycin discontinued, zosyn continued  1/15 Podiatry performed I + D right great toe  1/19 Surgery performed:  Right great toe amputation with primary closure. Percutaneous tendo achilles lengthening. Deep bone biopsy of remaining first metatarsal base.      Review of Systems   All other systems reviewed and are negative.    Objective:     Vital Signs (Most Recent):  Temp: 98 °F (36.7 °C) (01/22/17 1200)  Pulse: 74 (01/22/17 1200)  Resp: 20 (01/22/17 1200)  BP: (!) 165/99 (01/22/17 1200)  SpO2: 100 % (01/22/17 1154) Vital Signs (24h Range):  Temp:  [97.6 °F (36.4 °C)-99 °F (37.2 °C)] 98 °F (36.7 °C)  Pulse:  [74-94] 74  Resp:  [18-20] 20  SpO2:  [97 %-100 %] 100 %  BP: (140-165)/(81-99) 165/99     Weight: 93 kg (205 lb)  Body mass  index is 27.05 kg/(m^2).    Estimated Creatinine Clearance: 102.1 mL/min (based on Cr of 1).    Physical Exam   Constitutional: He is oriented to person, place, and time. He appears well-developed and well-nourished. No distress.   HENT:   Head: Normocephalic and atraumatic.   Mouth/Throat: Oropharynx is clear and moist.   Poor dentition   Eyes: Conjunctivae and EOM are normal. Pupils are equal, round, and reactive to light.   Neck: Normal range of motion. Neck supple. No JVD present.   Cardiovascular: Normal rate, regular rhythm and intact distal pulses.    S4   Pulmonary/Chest: Effort normal and breath sounds normal.   Abdominal: Soft. Bowel sounds are normal. He exhibits no distension and no mass. There is no tenderness.   Genitourinary:   Genitourinary Comments: No uriarte   Musculoskeletal:   UE with PIV no lesions; right foot photos noted; amp site is clean and dry; no cellulitis; left foot OK with hammer toes   Lymphadenopathy:     He has no cervical adenopathy.   Neurological: He is alert and oriented to person, place, and time. He exhibits normal muscle tone. Coordination normal.   Skin: Skin is warm and dry. No rash noted.   As above in terms of foot   Psychiatric:   Appropriate and cooperative   Nursing note and vitals reviewed.      Significant Labs:   All pertinent labs within the past 24 hours have been reviewed.    Significant Imaging: I have reviewed all pertinent imaging results/findings within the past 24 hours.

## 2017-01-22 NOTE — PROGRESS NOTES
Progress Note  Podiatry      SUBJECTIVE     History of Present Illness:  Indio Ryder Jr. is a 46 y.o. male who  has a past medical history of Diabetes mellitus type II; Diabetic foot ulcer; Hyperlipidemia; Hypertension; Peripheral neuropathy; Subacute osteomyelitis of right foot; and Ulcerative colitis, unspecified. He has had an ulcer sub first MTPJ right foot off and on for several years. He is a patient of Dr. Manuel's outpatient who sent him in to the ED for osteomyelitis. Patient relates that he did not see Dr. Manuel for a long period prior to this most recent visit due to insurance reasons. He has been trying to treat this ulcer himself bandaging it daily. Denies f/c/n/v. History of partial 5th ray amputation about 2 yrs ago with no complication.    1/20/17: Patient seen bedside s/p day 1 right foot hallux amputation and deep bone biopsy. Denies pain, remains non weight bearing. No acute pedal changes    1/21/17: S/p day two right hallux amputation.Denies pain to right foot. CAM boot and bandage intact to right foot.     Scheduled Meds:   enoxaparin  40 mg Subcutaneous Daily    fluconazole  800 mg Oral Daily    insulin aspart  8 Units Subcutaneous TIDWM    insulin detemir  15 Units Subcutaneous Daily    insulin detemir  25 Units Subcutaneous QHS    miconazole   Topical (Top) BID    piperacillin-tazobactam 4.5 g in dextrose 5 % 100 mL IVPB (ready to mix system)  4.5 g Intravenous Q8H     Continuous Infusions:     PRN Meds:acetaminophen, dextrose 50%, dextrose 50%, glucagon (human recombinant), glucose, glucose, insulin aspart, ondansetron, oxycodone, oxycodone, promethazine (PHENERGAN) IVPB, ramelteon, sodium chloride 0.9%    Review of patient's allergies indicates:  No Known Allergies     Past Medical History   Diagnosis Date    Diabetes mellitus type II     Diabetic foot ulcer     Hyperlipidemia     Hypertension     Peripheral neuropathy     Subacute osteomyelitis of right foot  1/12/2017     Streptococcus agalactiae, Actinomyces odontolyticus    Ulcerative colitis, unspecified      Past Surgical History   Procedure Laterality Date    Toe amputation Right 06/05/2015    Toe amputation Right 01/19/2017     Family History   Problem Relation Age of Onset    Adopted: Yes    Hypertension Mother     Cancer Mother      breast    Diabetes Mother     Hypertension Father     Cataracts Father     Diabetes Sister     Hypertension Maternal Aunt      Social History   Substance Use Topics    Smoking status: Never Smoker    Smokeless tobacco: None    Alcohol use No       Review of Systems:  Constitutional: no fever or chills, pain well controlled  Respiratory: no cough or shortness of breath  Cardiovascular: no chest pain or palpitations  Gastrointestinal: no nausea or vomiting, tolerating diet  Musculoskeletal: no arthralgias or myalgias  Neurological: history of numbness or paresthesias in the feet    OBJECTIVE     Vital Signs (Most Recent):  Temp: 99 °F (37.2 °C) (01/21/17 2000)  Pulse: 94 (01/21/17 2000)  Resp: 19 (01/21/17 2000)  BP: (!) 145/87 (01/21/17 2000)  SpO2: 99 % (01/21/17 1821)    Vital Signs Range (Last 24H):  Temp:  [98 °F (36.7 °C)-99 °F (37.2 °C)]   Pulse:  [87-94]   Resp:  [18-19]   BP: (137-162)/(86-94)   SpO2:  [96 %-99 %]     Physical Exam:  General: Oriented to person, place, time, and situation. No acute distress.  Vascular: DP and PT pulses are 2+ bilaterally. CRT<3 seconds to digits 1-5 bilaterally. Marked edema to the right LE   Musculoskeletal: Equinus noted b/l ankles with < 10 deg DF noted. Strength 5/5 in DF/PF/Inv/Ev resistance with no reproduction of pain in any direction.   Neuro: Protective sensation absent bilateral   Derm: No open lesions, lacerations or wounds noted to the left foot.     Right Foot. Incisions well coapted with sutures in place. Sanguinous drainage noted, no purulence, no malodor and no erythema.                  1/21/17          Laboratory:  CBC:     Recent Labs  Lab 01/20/17  0535   WBC 6.68   RBC 3.61*   HGB 10.1*   HCT 32.3*      MCV 90   MCH 28.0   MCHC 31.3*     CMP:     Recent Labs  Lab 01/20/17  0535   *   CALCIUM 8.8      K 4.0   CO2 29      BUN 9   CREATININE 1.0     ESR:     Recent Labs  Lab 01/16/17  0359   SEDRATE 113*     CRP:     Recent Labs  Lab 01/16/17  0359   CRP 5.1     Microbiology Results (last 7 days)     Procedure Component Value Units Date/Time    Aerobic culture [[id number]] Collected:  01/19/17 1257    Order Status:  Completed Specimen:  Bone from Toe, Right Foot Updated:  01/21/17 1425     Aerobic Bacterial Culture --     CANDIDA ALBICANS  Few       Aerobic Bacterial Culture --     CANDIDA PARAPSILOSIS  Few      AFB Culture & Smear [[id number]] Collected:  01/19/17 1257    Order Status:  Completed Specimen:  Bone from Toe, Right Foot Updated:  01/20/17 2127     AFB Culture & Smear Culture in progress     AFB CULTURE STAIN No acid fast bacilli seen.    Culture, Anaerobe [[id number]] Collected:  01/16/17 0718    Order Status:  Completed Specimen:  Tissue from Toe, Right Foot Updated:  01/20/17 1042     Anaerobic Culture --     ACTINOMYCES ODONTOLYTICUS  Few      Narrative:       1st ray    Culture, Anaerobe [[id number]] Collected:  01/19/17 1257    Order Status:  Completed Specimen:  Bone from Toe, Right Foot Updated:  01/20/17 0712     Anaerobic Culture Culture in progress    Gram stain [[id number]] Collected:  01/19/17 1257    Order Status:  Completed Specimen:  Bone from Toe, Right Foot Updated:  01/19/17 1746     Gram Stain Result Rare WBC's      No organisms seen    Fungus culture [[id number]] Collected:  01/19/17 1257    Order Status:  Sent Specimen:  Bone from Toe, Right Foot Updated:  01/19/17 1628    Aerobic culture [[id number]] Collected:  01/16/17 0718    Order Status:  Completed Specimen:  Tissue from Toe, Right Foot Updated:  01/18/17 0736     Aerobic  Bacterial Culture Skin janes,  no predominant organism    Narrative:       1st ray    AFB Culture & Smear [[id number]] Collected:  01/16/17 0718    Order Status:  Completed Specimen:  Tissue from Toe, Right Foot Updated:  01/17/17 2127     AFB Culture & Smear Culture in progress     AFB CULTURE STAIN No acid fast bacilli seen.    Narrative:       1st ray    Fungus culture [[id number]] Collected:  01/16/17 0718    Order Status:  Completed Specimen:  Tissue from Toe, Right Foot Updated:  01/17/17 1142     Fungus (Mycology) Culture Culture in progress    Narrative:       1st ray    Culture, Anaerobe [[id number]] Collected:  01/12/17 1505    Order Status:  Completed Specimen:  Abscess from Foot, Right Updated:  01/17/17 1112     Anaerobic Culture --     ACTINOMYCES ODONTOLYTICUS  Many      Blood culture [[id number]] Collected:  01/12/17 0342    Order Status:  Completed Specimen:  Blood from Peripheral, Forearm, Left Updated:  01/17/17 0812     Blood Culture, Routine No growth after 5 days.    Blood culture [[id number]] Collected:  01/12/17 0230    Order Status:  Completed Specimen:  Blood from Peripheral, Right  Arm Updated:  01/17/17 0812     Blood Culture, Routine No growth after 5 days.    Gram stain [[id number]] Collected:  01/16/17 0718    Order Status:  Completed Specimen:  Tissue from Toe, Right Foot Updated:  01/16/17 1302     Gram Stain Result Few WBC's      Moderate Gram positive cocci      Rare Gram negative rods      Rare Gram positive rods    Narrative:       1st ray    Aerobic culture [[id number]] Collected:  01/12/17 1505    Order Status:  Completed Specimen:  Abscess from Foot, Right Updated:  01/16/17 1202     Aerobic Bacterial Culture Skin janes,  no predominant organism          Diagnostic Results:  X-Ray: reviewed  MRI: pending     Right foot x-ray:   Interval marked deformity and destruction about the first MTP joint involving the base of the first proximal phalanx and head of the first metatarsal highly  concerning for septic arthritis/osteomyelitis. There is associated overlying cellulitis with focal plantar ulceration.    Interval partial amputation of the fifth metatarsal, noting slight irregularity with periosteal reaction involving the distal most portion of the remaining fifth metatarsal concerning for osteomyelitis.    MRI, right foot:   Status post metatarsal amputation of 5th ray.  There is advanced bone marrow replacement of the 1st ray involving the majority of the metatarsal as well as the phalanges.  There is associated cortical destruction, fragmentation of the 1st metatarsal head and extensive soft tissue edema.  There is a plantar ulcer in the region of the MTP joint with a 3 x 3 x 2 cm abscess replacing the joint.  Additional small ulcer noted at the distal aspect of the toe.  Remaining rays demonstrate preserved marrow signal, without evidence for fracture or osteomyelitis.  Lisfranc ligament is intact.  1.  Great toe osteomyelitis involving the phalanges and majority of metatarsal.  Plantar ulcer with abscess at the MTP joint.  Additional small ulcer at the distal aspect of the toe.    ALISSA:The right ankle brachial index was 1.14 which is normal or elevated  The left ankle brachial index was 1.28 which is normal.   The waveforms of both lower extremities by Doppler and PVR's are diminished throughout suggesting arterial disease more significant than the resting ALISSA reflects. 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       ASSESSMENT/PLAN     Assessment:  -DM II with peripheral neuropathy  -Diabetic foot ulcer, right  -Diabetic foot infection with acute on chronic osteomyelitis of 1st ray s/p day 1 right hallux amputation.    Plan:  -Wound assessed and is stable. Painted with betadine, covered with xeroform and wrapped with gauze and cast padding secured with ace. Placed back in orthopedic boot  -Remain non weight bearing to right foot  -Antibiotics per ID recommendations, will likely still  need 6 weeks IV antibiotics but we did take a clean margin of the base of the first metatarsal in surgery if that comes back negative could consider shorter stent of PO medications.  -Follow up with Dr. Wilkes in 1 week after discharge on [facility]. OK for discharge from podiatry stand point after recommendations from ID on antibiotic management are in.  -If questions, please contact the on-call podiatry resident    Anaid Stern DPM PGY-2  Pager: [phone]

## 2017-01-23 VITALS
OXYGEN SATURATION: 96 % | HEART RATE: 86 BPM | TEMPERATURE: 98 F | HEIGHT: 73 IN | WEIGHT: 205 LBS | SYSTOLIC BLOOD PRESSURE: 168 MMHG | DIASTOLIC BLOOD PRESSURE: 97 MMHG | RESPIRATION RATE: 20 BRPM | BODY MASS INDEX: 27.17 KG/M2

## 2017-01-23 PROBLEM — B37.9 CANDIDA INFECTION: Status: ACTIVE | Noted: 2017-01-23

## 2017-01-23 LAB
ANION GAP SERPL CALC-SCNC: 7 MMOL/L
BACTERIA SPEC ANAEROBE CULT: NORMAL
BASOPHILS # BLD AUTO: 0.03 K/UL
BASOPHILS NFR BLD: 0.5 %
BUN SERPL-MCNC: 8 MG/DL
CALCIUM SERPL-MCNC: 9.6 MG/DL
CHLORIDE SERPL-SCNC: 104 MMOL/L
CO2 SERPL-SCNC: 32 MMOL/L
CREAT SERPL-MCNC: 1 MG/DL
DIFFERENTIAL METHOD: ABNORMAL
EOSINOPHIL # BLD AUTO: 0.1 K/UL
EOSINOPHIL NFR BLD: 1.4 %
ERYTHROCYTE [DISTWIDTH] IN BLOOD BY AUTOMATED COUNT: 13.8 %
EST. GFR  (AFRICAN AMERICAN): >60 ML/MIN/1.73 M^2
EST. GFR  (NON AFRICAN AMERICAN): >60 ML/MIN/1.73 M^2
GLUCOSE SERPL-MCNC: 134 MG/DL
HCT VFR BLD AUTO: 33.9 %
HGB BLD-MCNC: 10.6 G/DL
LYMPHOCYTES # BLD AUTO: 2 K/UL
LYMPHOCYTES NFR BLD: 32.3 %
MAGNESIUM SERPL-MCNC: 1.6 MG/DL
MCH RBC QN AUTO: 28.4 PG
MCHC RBC AUTO-ENTMCNC: 31.3 %
MCV RBC AUTO: 91 FL
MONOCYTES # BLD AUTO: 0.6 K/UL
MONOCYTES NFR BLD: 9.4 %
NEUTROPHILS # BLD AUTO: 3.6 K/UL
NEUTROPHILS NFR BLD: 56.2 %
PHOSPHATE SERPL-MCNC: 3.6 MG/DL
PLATELET # BLD AUTO: 378 K/UL
PMV BLD AUTO: 9.4 FL
POCT GLUCOSE: 159 MG/DL (ref 70–110)
POCT GLUCOSE: 94 MG/DL (ref 70–110)
POTASSIUM SERPL-SCNC: 4 MMOL/L
RBC # BLD AUTO: 3.73 M/UL
SODIUM SERPL-SCNC: 143 MMOL/L
WBC # BLD AUTO: 6.31 K/UL

## 2017-01-23 PROCEDURE — 25000003 PHARM REV CODE 250: Performed by: INTERNAL MEDICINE

## 2017-01-23 PROCEDURE — 97116 GAIT TRAINING THERAPY: CPT

## 2017-01-23 PROCEDURE — 25000003 PHARM REV CODE 250: Performed by: PHYSICIAN ASSISTANT

## 2017-01-23 PROCEDURE — 94761 N-INVAS EAR/PLS OXIMETRY MLT: CPT

## 2017-01-23 PROCEDURE — 97110 THERAPEUTIC EXERCISES: CPT

## 2017-01-23 PROCEDURE — 85025 COMPLETE CBC W/AUTO DIFF WBC: CPT

## 2017-01-23 PROCEDURE — 36415 COLL VENOUS BLD VENIPUNCTURE: CPT

## 2017-01-23 PROCEDURE — 80048 BASIC METABOLIC PNL TOTAL CA: CPT

## 2017-01-23 PROCEDURE — 83735 ASSAY OF MAGNESIUM: CPT

## 2017-01-23 PROCEDURE — 84100 ASSAY OF PHOSPHORUS: CPT

## 2017-01-23 RX ORDER — METRONIDAZOLE 500 MG/1
500 TABLET ORAL EVERY 8 HOURS
Qty: 90 TABLET | Refills: 1 | Status: SHIPPED | OUTPATIENT
Start: 2017-01-23 | End: 2017-02-22

## 2017-01-23 RX ORDER — DOXYLAMINE SUCCINATE 25 MG
TABLET ORAL 2 TIMES DAILY
Refills: 0 | Status: ON HOLD | COMMUNITY
Start: 2017-01-23 | End: 2017-05-31

## 2017-01-23 RX ORDER — AMOXICILLIN 500 MG/1
500 CAPSULE ORAL EVERY 8 HOURS
Qty: 90 CAPSULE | Refills: 1 | Status: SHIPPED | OUTPATIENT
Start: 2017-01-23 | End: 2017-02-22

## 2017-01-23 RX ORDER — FLUCONAZOLE 200 MG/1
800 TABLET ORAL DAILY
Qty: 120 TABLET | Refills: 1 | Status: SHIPPED | OUTPATIENT
Start: 2017-01-23 | End: 2017-02-22

## 2017-01-23 RX ORDER — OXYCODONE HYDROCHLORIDE 5 MG/1
5 TABLET ORAL EVERY 6 HOURS PRN
Qty: 10 TABLET | Refills: 0 | Status: ON HOLD | OUTPATIENT
Start: 2017-01-23 | End: 2017-05-31

## 2017-01-23 RX ORDER — METFORMIN HYDROCHLORIDE 500 MG/1
TABLET, EXTENDED RELEASE ORAL
Qty: 120 TABLET | Refills: 3 | Status: SHIPPED | OUTPATIENT
Start: 2017-01-23 | End: 2017-03-29 | Stop reason: SDUPTHER

## 2017-01-23 RX ADMIN — STANDARDIZED SENNA CONCENTRATE AND DOCUSATE SODIUM 1 TABLET: 8.6; 5 TABLET, FILM COATED ORAL at 09:01

## 2017-01-23 RX ADMIN — METRONIDAZOLE 500 MG: 500 TABLET ORAL at 06:01

## 2017-01-23 RX ADMIN — AMOXICILLIN 500 MG: 250 CAPSULE ORAL at 06:01

## 2017-01-23 RX ADMIN — INSULIN ASPART 8 UNITS: 100 INJECTION, SOLUTION INTRAVENOUS; SUBCUTANEOUS at 09:01

## 2017-01-23 RX ADMIN — MICONAZOLE NITRATE: 20 CREAM TOPICAL at 09:01

## 2017-01-23 RX ADMIN — INSULIN ASPART 2 UNITS: 100 INJECTION, SOLUTION INTRAVENOUS; SUBCUTANEOUS at 07:01

## 2017-01-23 RX ADMIN — FLUCONAZOLE 800 MG: 200 TABLET ORAL at 09:01

## 2017-01-23 RX ADMIN — INSULIN DETEMIR 15 UNITS: 100 INJECTION, SOLUTION SUBCUTANEOUS at 09:01

## 2017-01-23 NOTE — PROGRESS NOTES
.Pharmacy New Medication Education    Patient accepted medication education.    Pharmacy educated patient on the following medications, using the teach-back method.   Amoxillicin  Fluconazole  Flagyl  Phenergan  Senokot      Learners of pharmacy medication education included:  patient    Patient +/- learner response:  verbalize understanding

## 2017-01-23 NOTE — ASSESSMENT & PLAN NOTE
Diabetic ulcer of right foot associated with type 2 diabetes mellitus/GBS infection    S/p resection of the first metatarsal head and base of the proximal phalanx, and I&D of the hallux at the IPJ level, with partial closure with retention sutures and iodoform packing. S/p toe amputation and Achilles tendon lengthening. Transitioning to amoxicillin, flagyl po per ID today. Appreciate Infectious Disease and Podiatry. OR cultures with yeast, so starting fluconazole per ID. Continue to follow OR cultures.

## 2017-01-23 NOTE — ASSESSMENT & PLAN NOTE
Diabetic ulcer of right foot associated with type 2 diabetes mellitus/GBS infection/Candidal infection    S/p resection of the first metatarsal head and base of the proximal phalanx, and I&D of the hallux at the IPJ level, with partial closure with retention sutures and iodoform packing. S/p toe amputation and Achilles tendon lengthening. Continue amoxicillin, flagyl, and fluconazole for 6 weeks per ID today. Appreciate Infectious Disease and Podiatry. Wound Care: Painted with betadine, covered with xeroform and wrapped with gauze and cast padding secured with ace. Placed back in orthopedic boot. NWB to the foot.

## 2017-01-23 NOTE — PROGRESS NOTES
"[facility] [place] [facility]\"" Medicine  Progress Note    Patient Name: Indio Ryder Jr.  MRN: [id number]  Patient Class: IP- Inpatient   Admission Date: 1/12/2017  Length of Stay: 11 days  Attending Physician: Bharathi Terrazas MD  Primary Care Provider: Lorena Thakur MD      Subjective:     Principal Problem:Subacute osteomyelitis of right foot    HPI:  Indio Rydre Jr. Is a 46 y.o.  man with hypertension and diabetes mellitus type 2 with peripheral neuropathy, s/p 5th toe ray amputation on 6/05/15. He lives in [place], [place]. He is single. He works in [employer] at [employer]. His primary care physician is Dr. Lorena Thakur. His podiatrist is Dr. William Manuel.    He lost his insurance and was unable to continue following up with Dr. Manuel in October 2016. He had taken ciprofloxacin and metronidazole for 6 months up until then. He was performing wound care himself at home "a couple of times per week". He noticed malodorous drainage, redness and swelling of his right foot prior to [place], accompanied by fever, chills, and fatigue. He regained insurance beginning January 1, 2017.  Dr. Manuel saw him in his clinic on 1/10/17 and instructed him to go to an ED for IV antibiotics and to have "Dr. Matthew take a look at it". Dr. Matthew is a general surgeon at [facility]. He went to [facility], where there are also podiatrists, on 1/12/17.    He was afebrile, but pulse was 110s. WBC count was 9520 and hemoglobin and hematocrit were 11.8 and 37. ESR was 113, CRP was 19.6. He was hyperglycemic (glucose 421). X-ray of the right foot showed cellulitis and osteomyelitis of the right 1st and 5th metatarsals. He was given 1 liter normal saline, ceftriaxone, and vancomycin in the ED. He was admitted to [facility].       Hospital Course:  Vancomycin was continued and ceftriaxone changed to piperacillin-tazobactam. Podiatry was " consulted and performed bedside debridement and culture. He reported that he had been hyperglycemic recently due to the infection. Hemoglobin A1c was 15%. MRI showed osteomyelitis of most of the 1st metatarsal and the phalanges. Streptococcus agalactiae grew in bedside wound culture. Scheduled insulin doses were increased much higher than home doses to treat hyperglycemia. On 1/16/17 Dr. Ramirez Wilkes performed resection of the first metatarsal head and base of the proximal phalanx, and I&D of the hallux at the IPJ level, with partial closure with retention sutures and iodoform packing, as part one of a staged procedure. Piperacillin-tazobactam was continued to cover group B Streptococcus and Actinomyces odontolyticus per Infectious Disease. He returned to the OR on 1/19/17 for the second part of the procedure including amputation of the right great toe and Achilles tendon lengthening. He has had an uneventful post-operative course. He is ambulating with crutches and is NWB to the right foot. He will go home with amoxicillin 500 mg TID, metronidazole 500 mg TID, and fluconazole 800 mg daily for the osteo for at least 6 weeks. Will need weekly labs with CBC, CMP, and CRP. Wound care consists of: Painted with betadine, covered with xeroform and wrapped with gauze and cast padding secured with ace. Placed back in orthopedic boot.        Interval History: Doing well today. No significant pain in the foot. Ambulated around the nurses station with PT and crutches.     Review of Systems   Constitutional: Negative for chills and fever.   Gastrointestinal: Negative for nausea and vomiting.     Objective:     Vital Signs (Most Recent):  Temp: 97.3 °F (36.3 °C) (01/23/17 0800)  Pulse: 73 (01/23/17 0800)  Resp: 20 (01/23/17 0800)  BP: (!) 162/92 (01/23/17 0800)  SpO2: 96 % (01/23/17 0739) Vital Signs (24h Range):  Temp:  [97.3 °F (36.3 °C)-98.1 °F (36.7 °C)] 97.3 °F (36.3 °C)  Pulse:  [73-97] 73  Resp:  [20] 20  SpO2:  [96 %-100  %] 96 %  BP: (121-165)/(87-99) 162/92     Weight: 93 kg (205 lb)  Body mass index is 27.05 kg/(m^2).    Intake/Output Summary (Last 24 hours) at 01/23/17 1029  Last data filed at 01/23/17 0520   Gross per 24 hour   Intake              950 ml   Output             2475 ml   Net            -1525 ml      Physical Exam   Constitutional: He is oriented to person, place, and time. He appears well-developed and well-nourished. No distress.   Neurological: He is alert and oriented to person, place, and time.   Nursing note and vitals reviewed.      Significant Labs:   CBC:   Recent Labs  Lab 01/23/17  0504   WBC 6.31   HGB 10.6*   HCT 33.9*   *     CMP:   Recent Labs  Lab 01/23/17  0504      K 4.0      CO2 32*   *   BUN 8   CREATININE 1.0   CALCIUM 9.6   ANIONGAP 7*   EGFRNONAA >60       Significant Imaging: I have reviewed all pertinent imaging results/findings within the past 24 hours.    Assessment/Plan:      * Subacute osteomyelitis of right foot  Diabetic ulcer of right foot associated with type 2 diabetes mellitus/GBS infection/Candidal infection    S/p resection of the first metatarsal head and base of the proximal phalanx, and I&D of the hallux at the IPJ level, with partial closure with retention sutures and iodoform packing. S/p toe amputation and Achilles tendon lengthening. Continue amoxicillin, flagyl, and fluconazole for 6 weeks per ID today. Appreciate Infectious Disease and Podiatry. Wound Care: Painted with betadine, covered with xeroform and wrapped with gauze and cast padding secured with ace. Placed back in orthopedic boot. NWB to the foot.     Type 2 diabetes mellitus with diabetic neuropathy, with long-term current use of insulin  Takes metformin 500 mg BID and insulin glargine 25 units HS at home. Hemoglobin A1c 15%. Giving insulin detemir 15 units am 25 units HS, insulin aspart 8 units TID with meals, moderate dose insulin sliding scale. BS ranged 88 to 276. Will give the  increased dose of levemir on discharge.     VTE Risk Mitigation         Ordered     enoxaparin injection 40 mg  Daily     Route:  Subcutaneous        01/12/17 1239     Medium Risk of VTE  Once      01/12/17 1239        Time Spent:  I spent 35 minutes on this discharge, which includes examination, reviewing hospital course with patient/family, reviewing discharge medications and arranging follow-up care.      Bharathi Terrazas MD  Department of Hospital Medicine   [facility]

## 2017-01-23 NOTE — PLAN OF CARE
Problem: Patient Care Overview  Goal: Plan of Care Review  Outcome: Ongoing (interventions implemented as appropriate)  No c/o pain. No acute distress. Patient verbalized understanding the plan of care. Fall precautions maintained. Bed in low and locked position, side rails up x 2, call light within reach. Continue to monitor

## 2017-01-23 NOTE — PT/OT/SLP PROGRESS
Physical Therapy  Treatment    Indio Ryder Jr.   MRN: [id number]   Admitting Diagnosis: Subacute osteomyelitis of right foot    PT Received On: 01/23/17  PT Start Time: 1003     PT Stop Time: 1027    PT Total Time (min): 24 min       Billable Minutes:  Gait [id number] and Therapeutic Exercise 10    Treatment Type: Treatment  PT/PTA: PTA     PTA Visit Number: 3       General Precautions: Standard, fall  Orthopedic Precautions: RLE non weight bearing   Braces:  (post op boot on R)    Do you have any cultural, spiritual, Yarsanism conflicts, given your current situation?: none reported    Subjective:  Communicated with nsg prior to session.      Pain Rating:  (no c/o's)                   Objective:        Functional Mobility:  Bed Mobility:   Supine to Sit: Modified Independent    Transfers:  Sit <> Stand Assistance: Supervision  Sit <> Stand Assistive Device: Axillary crutches    Gait:   Gait Distance: ~375' X 1  Assistance 1: Stand by Assistance  Gait Assistive Device: Axillary crutches  Gait Pattern: swing-through gait  Gait Deviation(s): forward lean    Stairs:      Balance:   Static Sit: GOOD: Takes MODERATE challenges from all directions  Dynamic Sit: GOOD: Maintains balance through MODERATE excursions of active trunk movement  Static Stand: FAIR: Maintains without assist but unable to take challenges  Dynamic stand: GOOD: Needs SUPERVISION only during gait and able to self right with moderate      Therapeutic Activities and Exercises: le seated ex's X 20 reps inc: laq,hip flex,abd/add, pt comfortable with stairs using crutches       AM-PAC 6 CLICK MOBILITY  How much help from another person does this patient currently need?   1 = Unable, Total/Dependent Assistance  2 = A lot, Maximum/Moderate Assistance  3 = A little, Minimum/Contact Guard/Supervision  4 = None, Modified [place]/Independent    Turning over in bed (including adjusting bedclothes, sheets and blankets)?: 4  Sitting down on and standing up  from a chair with arms (e.g., wheelchair, bedside commode, etc.): 3  Moving from lying on back to sitting on the side of the bed?: 4  Moving to and from a bed to a chair (including a wheelchair)?: 3  Need to walk in hospital room?: 3  Climbing 3-5 steps with a railing?: 3  Total Score: 20    AM-PAC Raw Score CMS G-Code Modifier Level of Impairment Assistance   6 % Total / Unable   7 - 9 CM 80 - 100% Maximal Assist   10 - 14 CL 60 - 80% Moderate Assist   15 - 19 CK 40 - 60% Moderate Assist   20 - 22 CJ 20 - 40% Minimal Assist   23 CI 1-20% SBA / CGA   24 CH 0% Independent/ Mod I     Patient left EOB with call button in reach.    Assessment: Pt moving well with crutches,improved mobility and endurance  Indio Ryder Jr. is a 46 y.o. male with a medical diagnosis of Subacute osteomyelitis of right foot and presents with .    Rehab identified problem list/impairments: Rehab identified problem list/impairments: impaired endurance, impaired functional mobilty, gait instability, impaired balance, decreased lower extremity function    Rehab potential is excellent.    Activity tolerance: Good    Discharge recommendations: Discharge Facility/Level Of Care Needs: home     Barriers to discharge: Barriers to Discharge: None    Equipment recommendations: Equipment Needed After Discharge: shower chair     GOALS: see general POC  Physical Therapy Goals        Problem: Physical Therapy Goal    Goal Priority Disciplines Outcome Goal Variances Interventions   Physical Therapy Goal     PT/OT, PT Ongoing (interventions implemented as appropriate)     Description:  Goals to be met by: 2/3/17     Patient will increase functional independence with mobility by performin. Sit to stand transfer with Modified [place] w axillary crutches.  2.  Amb >125' with axillary crutches maintaining RLE NWB status with SVN/modified independence.  3.  Negotiate up/down 4 steps with ax crutches RLE NWB with CGA/SBA    Ongoing  assessment of discharge recs.  Most likely home with family support.                PLAN:    Patient to be seen daily  to address the above listed problems via gait training, therapeutic activities, therapeutic exercises  Plan of Care expires: 02/03/17  Plan of Care reviewed with: patient         Zachary GOETZ Shantanu, PTA  01/23/2017

## 2017-01-23 NOTE — DISCHARGE SUMMARY
"[facility]  Discharge Summary      Patient Name: Indio Ryder Jr.  MRN: [id number]  Admission Date: 1/12/2017  Hospital Length of Stay: 11 days  Discharge Date and Time: 1/23/2017 10:38 AM  Attending Physician: Bharathi Terrazas MD   Discharging Provider: Bharathi Terrazas MD  Primary Care Provider: Lorena Thakur MD      HPI:   Indio Ryder Jr. Is a 46 y.o.  man with hypertension and diabetes mellitus type 2 with peripheral neuropathy, s/p 5th toe ray amputation on 6/05/15. He lives in [place], [place]. He is single. He works in [employer] at [employer]. His primary care physician is Dr. Lorena Thakur. His podiatrist is Dr. William Manuel.    He lost his insurance and was unable to continue following up with Dr. Manuel in October 2016. He had taken ciprofloxacin and metronidazole for 6 months up until then. He was performing wound care himself at home "a couple of times per week". He noticed malodorous drainage, redness and swelling of his right foot prior to [place], accompanied by fever, chills, and fatigue. He regained insurance beginning January 1, 2017.  Dr. Manuel saw him in his clinic on 1/10/17 and instructed him to go to an ED for IV antibiotics and to have "Dr. Matthew take a look at it". Dr. Matthew is a general surgeon at [facility]. He went to [facility], where there are also podiatrists, on 1/12/17.    He was afebrile, but pulse was 110s. WBC count was 9520 and hemoglobin and hematocrit were 11.8 and 37. ESR was 113, CRP was 19.6. He was hyperglycemic (glucose 421). X-ray of the right foot showed cellulitis and osteomyelitis of the right 1st and 5th metatarsals. He was given 1 liter normal saline, ceftriaxone, and vancomycin in the ED. He was admitted to [facility].       Procedure(s) (LRB):  AMPUTATION-TOE and Tendo achilles lengthening (Right)      Indwelling Lines/Drains at " time of discharge:   Lines/Drains/Airways     Airway                 Airway - Non-Surgical Mask -- days          Surgical Packing                 Surgical Packing 3 days              Hospital Course:   Vancomycin was continued and ceftriaxone changed to piperacillin-tazobactam. Podiatry was consulted and performed bedside debridement and culture. He reported that he had been hyperglycemic recently due to the infection. Hemoglobin A1c was 15%. MRI showed osteomyelitis of most of the 1st metatarsal and the phalanges. Streptococcus agalactiae grew in bedside wound culture. Scheduled insulin doses were increased much higher than home doses to treat hyperglycemia. On 1/16/17 Dr. Ramirez Wilkes performed resection of the first metatarsal head and base of the proximal phalanx, and I&D of the hallux at the IPJ level, with partial closure with retention sutures and iodoform packing, as part one of a staged procedure. Piperacillin-tazobactam was continued to cover group B Streptococcus and Actinomyces odontolyticus per Infectious Disease. He returned to the OR on 1/19/17 for the second part of the procedure including amputation of the right great toe and Achilles tendon lengthening. He has had an uneventful post-operative course. He is ambulating with crutches and is NWB to the right foot. He will go home with amoxicillin 500 mg TID, metronidazole 500 mg TID, and fluconazole 800 mg daily for the osteo for at least 6 weeks. Will need weekly labs with CBC, CMP, and CRP. Wound care consists of: Painted with betadine, covered with xeroform and wrapped with gauze and cast padding secured with ace. Placed back in orthopedic boot.         Consults:   Consults         Status Ordering Provider     Inpatient consult to Infectious Diseases  Once     Provider:  Rola cMkinney MD    Completed ROSA SOTO     Inpatient consult to Podiatry  Once     Provider:  Daniel Reyes DPM    Completed ROSA SOTO          Significant  Diagnostic Studies: Labs:   CMP   Recent Labs  Lab 01/23/17  0504      K 4.0      CO2 32*   *   BUN 8   CREATININE 1.0   CALCIUM 9.6   ANIONGAP 7*   ESTGFRAFRICA >60   EGFRNONAA >60    and CBC   Recent Labs  Lab 01/23/17  0504   WBC 6.31   HGB 10.6*   HCT 33.9*   *     Microbiology: Wound Culture: positive for Candida albicans, Candida parapsilosis, GBS, Actinomyces odontolyticus    Radiology: X-Ray: Right foot: Examination limited by overlying walking boot.  Postsurgical changes consistent with great toe amputation to the level of the proximal first metatarsal.  Remote changes consistent with amputation through the mid fifth metatarsal.  Persistent lateral deviation of the second through fourth toes.  Skin staples and small amount of postoperative air visualized at the great toe amputation site.  No definite complications seen.    Pending Diagnostic Studies:     None        Final Active Diagnoses:    Diagnosis Date Noted POA    PRINCIPAL PROBLEM:  Subacute osteomyelitis of right foot [M86.271] 01/12/2017 Yes    Candida infection [B37.9] 01/23/2017 Yes    Actinomyces infection [A42.9] 01/17/2017 Yes    Osteomyelitis of right foot [M86.9] 01/15/2017 Yes    Group B streptococcal infection [A49.1] 01/13/2017 Yes    Tinea pedis of left foot [B35.3] 01/12/2017 Yes    Type 2 diabetes mellitus with diabetic neuropathy, with long-term current use of insulin [E11.40, Z79.4] 05/03/2016 Not Applicable     Chronic    Diabetic ulcer of right foot associated with type 2 diabetes mellitus [E11.621, L97.519] 06/03/2015 Yes     Chronic    Hyperlipidemia [E78.5] 08/12/2014 Yes     Chronic    Essential hypertension [I10] 06/06/2013 Yes     Chronic      Problems Resolved During this Admission:    Diagnosis Date Noted Date Resolved POA      * Subacute osteomyelitis of right foot  Diabetic ulcer of right foot associated with type 2 diabetes mellitus/GBS infection/Candidal infection    S/p resection of  the first metatarsal head and base of the proximal phalanx, and I&D of the hallux at the IPJ level, with partial closure with retention sutures and iodoform packing. S/p toe amputation and Achilles tendon lengthening. Continue amoxicillin, flagyl, and fluconazole for 6 weeks per ID today. Appreciate Infectious Disease and Podiatry. Wound Care: Painted with betadine, covered with xeroform and wrapped with gauze and cast padding secured with ace. Placed back in orthopedic boot. NWB to the foot.       Discharged Condition: good    Disposition: Home or Self Care    Follow Up:  Follow-up Information     Follow up with Lorena Thakur MD.    Specialty:  Internal Medicine    Contact information:    [address]  [place] [id number] [phone]          Follow up with Ramirez Wilkes DPM. Schedule an appointment as soon as possible for a visit in 1 week.    Specialty:  Podiatry    Contact information:    [address] [facility] [address]  [phone]          Patient Instructions:     Diet Diabetic 2000 Calories     Activity as tolerated     Call MD for:  temperature >100.4     Call MD for:  redness, tenderness, or signs of infection (pain, swelling, redness, odor or green/yellow discharge around incision site)     Call MD for:  severe uncontrolled pain     Call MD for:  persistent dizziness, light-headedness, or visual disturbances     Call MD for:  increased confusion or weakness     Change dressing (specify)   Order Comments: Right foot: Painted with betadine, covered with xeroform and wrapped with gauze and cast padding secured with ace. Place back in orthopedic boot.       Medications:  Reconciled Home Medications:   Current Discharge Medication List      START taking these medications    Details   amoxicillin (AMOXIL) 500 MG capsule Take 1 capsule (500 mg total) by mouth every 8 (eight) hours.  Qty: 90 capsule, Refills: 1      fluconazole (DIFLUCAN) 200 MG Tab Take 4 tablets (800 mg total) by mouth  once daily.  Qty: 120 tablet, Refills: 1      metronidazole (FLAGYL) 500 MG tablet Take 1 tablet (500 mg total) by mouth every 8 (eight) hours.  Qty: 90 tablet, Refills: 1      miconazole (MICOTIN) 2 % cream Apply topically 2 (two) times daily. Apply to left foot after thorough cleaning with soap and water. Need to dry foot completely prior to placing antifungal cream on foot.  Refills: 0      oxycodone (ROXICODONE) 5 MG immediate release tablet Take 1 tablet (5 mg total) by mouth every 6 (six) hours as needed for Pain.  Qty: 10 tablet, Refills: 0         CONTINUE these medications which have CHANGED    Details   metformin (GLUCOPHAGE-XR) 500 MG 24 hr tablet TAKE TWO TABLETS BY MOUTH TWICE A DAY WITH MEALS  Qty: 120 tablet, Refills: 3         CONTINUE these medications which have NOT CHANGED    Details   insulin glargine (LANTUS SOLOSTAR) 100 unit/mL (3 mL) InPn pen Inject 26 Units into the skin every evening. 35 Insulin Pen Subcutaneous daily  Qty: 1 Box, Refills: 3    Associated Diagnoses: Type II or unspecified type diabetes mellitus with neurological manifestations, uncontrolled         STOP taking these medications       dulaglutide (TRULICITY) 0.75 mg/0.5 mL PnIj Comments:   Reason for Stopping:             Time spent on the discharge of patient: 35 minutes    Bharathi Terrazas MD  Department of Hospital Medicine  [facility]

## 2017-01-23 NOTE — PLAN OF CARE
Patient has crutches that PT gave patient and has boot on foot. Patient awaiting medications from [facility] for bedside delivery.    Future Appointments  Date Time Provider Department Center   2/1/2017 1:00 PM Lorena Thakur MD [facility] IM [address] PCW   2/6/2017 4:00 PM Iris Lenoe DPM [facility] POD [address]        01/23/17 1148   Final Note   Assessment Type Final Discharge Note   Discharge Disposition Home   Discharge planning education complete? Yes   What phone number can be called within the next 1-3 days to see how you are doing after discharge? [phone]   Hospital Follow Up  Appt(s) scheduled? Yes   Discharge plans and expectations educations in teach back method with documentation complete? Yes   Offered [facility] -- Bedside Delivery? Yes   [facility] No   Referral to Outpatient Case Management complete? No   Referral to / orders for Home Health Complete? No   30 day supply of medicines given at discharge, if documented non-compliance / non-adherence? No   Any social issues identified prior to discharge? No   Did you assess the readiness or willingness of the family or caregiver to support self management of care? No   Right Care Referral Info   Post Acute Recommendation No Care     Geetha Moraes, RN, CCM, CMSRN  RN Transition Navigator  [phone]

## 2017-01-23 NOTE — ASSESSMENT & PLAN NOTE
Takes metformin 500 mg BID and insulin glargine 25 units HS at home. Hemoglobin A1c 15%. Giving insulin detemir 15 units am 25 units HS, insulin aspart 8 units TID with meals, moderate dose insulin sliding scale. BS ranged 88 to 276. Will give the increased dose of levemir on discharge.

## 2017-01-23 NOTE — NURSING
IV site removed and was intact. No active bleeding noted. Discharge instructions, rxs and educational printouts given to pt and discussed thoroughly. Patient verbalized clear understanding of all discussed. Patient d/c'd via w/c with escort service and family with all of patient's belongings. At present no distress noted.

## 2017-01-23 NOTE — SUBJECTIVE & OBJECTIVE
Interval History: Feeling fine this AM. Sitting up in the chair. No pain in the foot.     Review of Systems   Constitutional: Negative for chills and fever.   Respiratory: Negative for cough and shortness of breath.      Objective:     Vital Signs (Most Recent):  Temp: 97.9 °F (36.6 °C) (01/23/17 0010)  Pulse: 89 (01/23/17 0010)  Resp: 20 (01/23/17 0010)  BP: 139/87 (01/23/17 0010)  SpO2: 98 % (01/23/17 0055) Vital Signs (24h Range):  Temp:  [97.5 °F (36.4 °C)-98.1 °F (36.7 °C)] 97.9 °F (36.6 °C)  Pulse:  [74-97] 89  Resp:  [20] 20  SpO2:  [97 %-100 %] 98 %  BP: (121-165)/(85-99) 139/87     Weight: 93 kg (205 lb)  Body mass index is 27.05 kg/(m^2).    Intake/Output Summary (Last 24 hours) at 01/23/17 0135  Last data filed at 01/23/17 0045   Gross per 24 hour   Intake              750 ml   Output             2325 ml   Net            -1575 ml      Physical Exam   Constitutional: He is oriented to person, place, and time. He appears well-developed and well-nourished. No distress.   Musculoskeletal: He exhibits no edema.   Right foot wrapped   Neurological: He is alert and oriented to person, place, and time.   Psychiatric: He has a normal mood and affect. His behavior is normal.   Nursing note and vitals reviewed.      Significant Labs: None    Significant Imaging: I have reviewed all pertinent imaging results/findings within the past 24 hours.

## 2017-01-23 NOTE — PROGRESS NOTES
"[facility]  Progress Note    Patient Name: Indio Ryder Jr.  MRN: [id number]  Patient Class: IP- Inpatient   Admission Date: 1/12/2017  Length of Stay: 11 days  Attending Physician: Bharathi Terrazas MD  Primary Care Provider: Lorena Thakur MD        Subjective:     Principal Problem:Subacute osteomyelitis of right foot    HPI:  Indio Ryder Jr. Is a 46 y.o.  man with hypertension and diabetes mellitus type 2 with peripheral neuropathy, s/p 5th toe ray amputation on 6/05/15. He lives in [place], [place]. He is single. He works in [employer] at [employer]. His primary care physician is Dr. Lorena Thakur. His podiatrist is Dr. William Manuel.    He lost his insurance and was unable to continue following up with Dr. Manuel in October 2016. He had taken ciprofloxacin and metronidazole for 6 months up until then. He was performing wound care himself at home "a couple of times per week". He noticed malodorous drainage, redness and swelling of his right foot prior to [place], accompanied by fever, chills, and fatigue. He regained insurance beginning January 1, 2017.  Dr. Manuel saw him in his clinic on 1/10/17 and instructed him to go to an ED for IV antibiotics and to have "Dr. Matthew take a look at it". Dr. Matthew is a general surgeon at [facility]. He went to [facility], where there are also podiatrists, on 1/12/17.    He was afebrile, but pulse was 110s. WBC count was 9520 and hemoglobin and hematocrit were 11.8 and 37. ESR was 113, CRP was 19.6. He was hyperglycemic (glucose 421). X-ray of the right foot showed cellulitis and osteomyelitis of the right 1st and 5th metatarsals. He was given 1 liter normal saline, ceftriaxone, and vancomycin in the ED. He was admitted to [facility].       Hospital Course:  Vancomycin was continued and ceftriaxone changed to piperacillin-tazobactam. Podiatry was " consulted and performed bedside debridement and culture. He reported that he had been hyperglycemic recently due to the infection. Hemoglobin A1c was 15%. MRI showed osteomyelitis of most of the 1st metatarsal and the phalanges. Streptococcus agalactiae grew in bedside wound culture. Scheduled insulin doses were increased much higher than home doses to treat hyperglycemia. On 1/16/17 Dr. Ramirez Wilkes performed resection of the first metatarsal head and base of the proximal phalanx, and I&D of the hallux at the IPJ level, with partial closure with retention sutures and iodoform packing, as part one of a staged procedure. Piperacillin-tazobactam was continued to cover group B Streptococcus and Actinomyces odontolyticus per Infectious Disease. He returned to the OR on 1/19/17 for the second part of the procedure including amputation of the right great toe and Achilles tendon lengthening.     Interval History: Feeling fine this AM. Sitting up in the chair. No pain in the foot.     Review of Systems   Constitutional: Negative for chills and fever.   Respiratory: Negative for cough and shortness of breath.      Objective:     Vital Signs (Most Recent):  Temp: 97.9 °F (36.6 °C) (01/23/17 0010)  Pulse: 89 (01/23/17 0010)  Resp: 20 (01/23/17 0010)  BP: 139/87 (01/23/17 0010)  SpO2: 98 % (01/23/17 0055) Vital Signs (24h Range):  Temp:  [97.5 °F (36.4 °C)-98.1 °F (36.7 °C)] 97.9 °F (36.6 °C)  Pulse:  [74-97] 89  Resp:  [20] 20  SpO2:  [97 %-100 %] 98 %  BP: (121-165)/(85-99) 139/87     Weight: 93 kg (205 lb)  Body mass index is 27.05 kg/(m^2).    Intake/Output Summary (Last 24 hours) at 01/23/17 0135  Last data filed at 01/23/17 0045   Gross per 24 hour   Intake              750 ml   Output             2325 ml   Net            -1575 ml      Physical Exam   Constitutional: He is oriented to person, place, and time. He appears well-developed and well-nourished. No distress.   Musculoskeletal: He exhibits no edema.   Right foot  wrapped   Neurological: He is alert and oriented to person, place, and time.   Psychiatric: He has a normal mood and affect. His behavior is normal.   Nursing note and vitals reviewed.      Significant Labs: None    Significant Imaging: I have reviewed all pertinent imaging results/findings within the past 24 hours.    Assessment/Plan:      * Subacute osteomyelitis of right foot  Diabetic ulcer of right foot associated with type 2 diabetes mellitus/GBS infection    S/p resection of the first metatarsal head and base of the proximal phalanx, and I&D of the hallux at the IPJ level, with partial closure with retention sutures and iodoform packing. S/p toe amputation and Achilles tendon lengthening. Transitioning to amoxicillin, flagyl po per ID today. Appreciate Infectious Disease and Podiatry. OR cultures with yeast, so starting fluconazole per ID. Continue to follow OR cultures.       Essential hypertension  Not on medications. SBP ranged 140 to 162.       Type 2 diabetes mellitus with diabetic neuropathy, with long-term current use of insulin  Takes metformin 500 mg BID and insulin glargine 25 units HS at home. Hemoglobin A1c 15%. Giving insulin detemir 15 units am 25 units HS, insulin aspart 8 units TID with meals, moderate dose insulin sliding scale.     VTE Risk Mitigation         Ordered     enoxaparin injection 40 mg  Daily     Route:  Subcutaneous        01/12/17 1239     Medium Risk of VTE  Once      01/12/17 1239          Bharathi Terrazas MD  Department of Hospital Medicine   [facility]

## 2017-01-23 NOTE — ASSESSMENT & PLAN NOTE
Takes metformin 500 mg BID and insulin glargine 25 units HS at home. Hemoglobin A1c 15%. Giving insulin detemir 15 units am 25 units HS, insulin aspart 8 units TID with meals, moderate dose insulin sliding scale.

## 2017-01-23 NOTE — SUBJECTIVE & OBJECTIVE
Interval History: Doing well today. No significant pain in the foot. Ambulated around the nurses station with PT and crutches.     Review of Systems   Constitutional: Negative for chills and fever.   Gastrointestinal: Negative for nausea and vomiting.     Objective:     Vital Signs (Most Recent):  Temp: 97.3 °F (36.3 °C) (01/23/17 0800)  Pulse: 73 (01/23/17 0800)  Resp: 20 (01/23/17 0800)  BP: (!) 162/92 (01/23/17 0800)  SpO2: 96 % (01/23/17 0739) Vital Signs (24h Range):  Temp:  [97.3 °F (36.3 °C)-98.1 °F (36.7 °C)] 97.3 °F (36.3 °C)  Pulse:  [73-97] 73  Resp:  [20] 20  SpO2:  [96 %-100 %] 96 %  BP: (121-165)/(87-99) 162/92     Weight: 93 kg (205 lb)  Body mass index is 27.05 kg/(m^2).    Intake/Output Summary (Last 24 hours) at 01/23/17 1029  Last data filed at 01/23/17 0520   Gross per 24 hour   Intake              950 ml   Output             2475 ml   Net            -1525 ml      Physical Exam   Constitutional: He is oriented to person, place, and time. He appears well-developed and well-nourished. No distress.   Neurological: He is alert and oriented to person, place, and time.   Nursing note and vitals reviewed.      Significant Labs:   CBC:   Recent Labs  Lab 01/23/17  0504   WBC 6.31   HGB 10.6*   HCT 33.9*   *     CMP:   Recent Labs  Lab 01/23/17  0504      K 4.0      CO2 32*   *   BUN 8   CREATININE 1.0   CALCIUM 9.6   ANIONGAP 7*   EGFRNONAA >60       Significant Imaging: I have reviewed all pertinent imaging results/findings within the past 24 hours.

## 2017-01-24 ENCOUNTER — PATIENT OUTREACH (OUTPATIENT)
Dept: ADMINISTRATIVE | Facility: CLINIC | Age: 47
End: 2017-01-24
Payer: MEDICAID

## 2017-01-24 NOTE — PROGRESS NOTES
C3 nurse attempted to contact patient. No answer. The following message was left for the patient to return the call:  Good morning  I am a nurse calling on behalf of [facility] from the [facility].  This is a Transitional Care Call for Indio Ryder Jr. When you have a moment please contact us at [phone] or [phone] Monday through Friday, between the hours of 8 am to 4 pm. We look forward to speaking with you. On behalf of [facility] have a nice day.    The patient has a scheduled HOSFU appointment with Lorena Thakur MD On 2/1/2017 @ 1:00pm. Message sent to Physician staff.

## 2017-01-24 NOTE — PATIENT INSTRUCTIONS
Wound Care  Taking proper care of your wound will help it heal. Your healthcare provider may show you how to clean and dress the wound. He or she will also explain how to tell if the wound is healing normally. If you are unsure of how to take care of the wound, be sure to clarify what dressing to use and how often you should change the bandages. Here are the basic steps.     A wound that's not healing normally may be dark in color or have white streaks.   Wash your hands  Tips for washing your hands include:  · Use liquid soap and lather for 2 minutes. Scrub between your fingers and under your nails.  · Rinse with warm water, keeping your fingers pointing down.  · Use a paper towel to dry your hands and to turn off the faucet.  Remove the used dressing  Here are suggestions for removing the dressing:  · If dressing changes cause you pain, be sure to take your pain medicine as prescribed by your healthcare provider 30 minutes before dressing changes.  · Set up your supplies.  · Put on disposable gloves if youre dressing a wound for someone else or your wound is infected.  · Loosen the tape by pulling gently toward the wound.  · Gently take off the old dressing. If the dressing is stuck to the wound, moisten it with saline (if available) or clean water.  · If you have a drain or tube in the wound, be careful not to pull on it.  · Remove the dressing 1 layer at a time and put it in a plastic bag.  · Remove your gloves.  Inspect and dress the wound  Check the wound carefully:  · Each time you change the dressing, inspect the wound carefully to be sure its healing normally by making sure your wound appears to be pink and moist, and is free of infection.    · Wash your hands again. Put on a new pair of gloves.  · Clean and dress the wound as directed by your healthcare provider or nurse. Do not put anything in the wound that is not prescribed or directed by your healthcare provider. If you have a drain or tube, be  careful not to pull on it. Make sure to secure the drain or tube as well.  · Put all supplies in a plastic bag. Seal the bag and put it in the trash.  · Be sure to wash your hands again.  Call your healthcare provider  Call your healthcare provider if you see any of the following signs of a problem:  · Bleeding that soaks the dressing  · Pink fluid weeping from the wound  · Increased drainage or drainage that is yellow, yellow-green, or foul-smelling  · Increased swelling or pain, or redness or swelling in the skin around the wound  · A change in the color of the wound, or if streaks develop in a direction away from the wound  · The area between any stitches opens up  · An increase in the size of the wound  · A fever over 101°F (38.3°C), chills, increased fatigue, or a loss of appetite.      © 5497-8663 [employer]. [address] [place] [address], [place] [address]. All rights reserved. This information is not intended as a substitute for professional medical care. Always follow your healthcare professional's instructions.

## 2017-01-25 ENCOUNTER — TELEPHONE (OUTPATIENT)
Dept: PODIATRY | Facility: CLINIC | Age: 47
End: 2017-01-25

## 2017-01-26 NOTE — TELEPHONE ENCOUNTER
----- Message from Jaimie Velasco sent at 1/25/2017 11:13 AM CST -----  Contact: SELF   Patient is calling to speak with the nurse, he stated he called yesterday morning at 8am to discuss he wounds from his procedure , he is not sure if he needs to change the dressings or bandages on them. He has not received a call yet.    He can be reached at [phone]

## 2017-02-06 ENCOUNTER — OFFICE VISIT (OUTPATIENT)
Dept: PODIATRY | Facility: CLINIC | Age: 47
End: 2017-02-06
Payer: MEDICAID

## 2017-02-06 VITALS
HEART RATE: 90 BPM | WEIGHT: 205 LBS | SYSTOLIC BLOOD PRESSURE: 163 MMHG | DIASTOLIC BLOOD PRESSURE: 97 MMHG | BODY MASS INDEX: 27.77 KG/M2 | HEIGHT: 72 IN

## 2017-02-06 DIAGNOSIS — Z98.890 POST-OPERATIVE STATE: Primary | ICD-10-CM

## 2017-02-06 PROCEDURE — 99213 OFFICE O/P EST LOW 20 MIN: CPT | Mod: PBBFAC | Performed by: PODIATRIST

## 2017-02-06 PROCEDURE — 99999 PR PBB SHADOW E&M-EST. PATIENT-LVL III: CPT | Mod: PBBFAC,,, | Performed by: PODIATRIST

## 2017-02-06 PROCEDURE — 99024 POSTOP FOLLOW-UP VISIT: CPT | Mod: ,,, | Performed by: PODIATRIST

## 2017-02-06 NOTE — PROGRESS NOTES
Indio Ryder Jr. is following up 2 weeks post op first ray resection with tendo Achilles lengthening right foot. Patient reports good compliance by keeping the dressings clean dry and intact and avoiding excessive ambulation and remaining in camwalker on the operative foot. Patient reports good pain control with prescribed pain meds.  Patient denies any fevers, chills, nausea, vomiting or pain.     LOWER EXTREMITY PHYSICAL EXAM:  VASCULAR: DP and PT pulses palpable with capillary fill time 3 seconds to right foot.   DERMATOLOGIC: Sutures and staples intact and incision site well coapted. Minimal surrounding postoperative swelling and ecchymosis right foot. No surrounding redness or streaking.  NEURO: Plantar protective sensation by touch is present surrounding and distal to the operative incision site.  MUSCULOSKELETAL: Positive passive and active range of motion of the digits of surrounding digits    ASSESSMENT AND PLAN: 2 weeks status post first ray resection with tendo Achilles lengthening right foot. Dressings were changed and applied to the operative site right foot.  Dressings sutures and staples to remain until follow-up since patient is diabetic with potential delayed healing. Patient was instructed to keep the dressing clean dry and intact and remain nonweightbearing on operative foot with crutches in the camwalker. Patient will followup in 1 week for possible suture removal and will call if any problems arise.

## 2017-02-06 NOTE — PATIENT INSTRUCTIONS
Keep post op dressings clean, dry and intact. Keep foot elevated and no weightbearing until instructed.

## 2017-02-10 ENCOUNTER — OFFICE VISIT (OUTPATIENT)
Dept: PODIATRY | Facility: CLINIC | Age: 47
End: 2017-02-10
Payer: MEDICAID

## 2017-02-10 VITALS — HEART RATE: 99 BPM | DIASTOLIC BLOOD PRESSURE: 91 MMHG | SYSTOLIC BLOOD PRESSURE: 159 MMHG | HEIGHT: 72 IN

## 2017-02-10 DIAGNOSIS — M86.271 SUBACUTE OSTEOMYELITIS OF RIGHT FOOT: ICD-10-CM

## 2017-02-10 DIAGNOSIS — Z89.419 HISTORY OF AMPUTATION OF HALLUX: ICD-10-CM

## 2017-02-10 DIAGNOSIS — Z98.890 POST-OPERATIVE STATE: Primary | ICD-10-CM

## 2017-02-10 PROCEDURE — 99212 OFFICE O/P EST SF 10 MIN: CPT | Mod: PBBFAC | Performed by: PODIATRIST

## 2017-02-10 PROCEDURE — 99024 POSTOP FOLLOW-UP VISIT: CPT | Mod: ,,, | Performed by: PODIATRIST

## 2017-02-10 PROCEDURE — 99999 PR PBB SHADOW E&M-EST. PATIENT-LVL II: CPT | Mod: PBBFAC,,, | Performed by: PODIATRIST

## 2017-02-14 NOTE — PROGRESS NOTES
Subjective:      Patient ID: Indio Ryder Jr. is a 46 y.o. male.    Chief Complaint: Post-op Evaluation (amput right ft./ PCP Dr. Thakur 10/29/15)    Indio is a 46 y.o. male who presents to the clinic for evaluation and treatment of high risk feet. Indio has a past medical history of Diabetes mellitus type II; Diabetic foot ulcer; Hyperlipidemia; Hypertension; Peripheral neuropathy; Subacute osteomyelitis of right foot (1/12/2017); and Ulcerative colitis, unspecified. He is now 3 weeks s/p I&D, partial first ray amputation and RUSLAN, right. He has been NWB with crutches. Dressing change, eval by Dr. Leone last week. Kept CAM boot dry and intact. Taking PO abx, amoxicillin, flagyl without complaint with plan for 6 weeks per ID recs. Denies calf pain or swelling. No new concerns.   PCP: Lorena Thakur MD        Hemoglobin A1C   Date Value Ref Range Status   01/12/2017 15.0 (H) 4.5 - 6.2 % Final     Comment:     According to ADA guidelines, hemoglobin A1C <7.0% represents  optimal control in non-pregnant diabetic patients.  Different  metrics may apply to specific populations.   Standards of Medical Care in Diabetes - 2016.  For the purpose of screening for the presence of diabetes:  <5.7%     Consistent with the absence of diabetes  5.7-6.4%  Consistent with increasing risk for diabetes   (prediabetes)  >or=6.5%  Consistent with diabetes  Currently no consensus exists for use of hemoglobin A1C  for diagnosis of diabetes for children.     05/03/2016 9.0 (H) 4.5 - 6.2 % Final   10/29/2015 7.6 (H) 4.5 - 6.2 % Final     Past Medical History   Diagnosis Date    Diabetes mellitus type II     Diabetic foot ulcer     Hyperlipidemia     Hypertension     Peripheral neuropathy     Subacute osteomyelitis of right foot 1/12/2017     Streptococcus agalactiae, Actinomyces odontolyticus    Ulcerative colitis, unspecified        Past Surgical History   Procedure Laterality Date    Toe amputation Right 06/05/2015    Toe  amputation Right 01/19/2017       Family History   Problem Relation Age of Onset    Adopted: Yes    Hypertension Mother     Cancer Mother      breast    Diabetes Mother     Hypertension Father     Cataracts Father     Diabetes Sister     Hypertension Maternal Aunt        Social History     Social History    Marital status: Single     Spouse name: N/A    Number of children: 0    Years of education: N/A     Occupational History     [employer]     Social History Main Topics    Smoking status: Never Smoker    Smokeless tobacco: None    Alcohol use No    Drug use: No    Sexual activity: No     Other Topics Concern    None     Social History Narrative       Current Outpatient Prescriptions   Medication Sig Dispense Refill    amoxicillin (AMOXIL) 500 MG capsule Take 1 capsule (500 mg total) by mouth every 8 (eight) hours. 90 capsule 1    fluconazole (DIFLUCAN) 200 MG Tab Take 4 tablets (800 mg total) by mouth once daily. 120 tablet 1    insulin glargine (LANTUS SOLOSTAR) 100 unit/mL (3 mL) InPn pen Inject 26 Units into the skin every evening. 35 Insulin Pen Subcutaneous daily (Patient taking differently: Inject 25 Units into the skin every evening. ) 1 Box 3    metformin (GLUCOPHAGE-XR) 500 MG 24 hr tablet TAKE TWO TABLETS BY MOUTH TWICE A DAY WITH MEALS 120 tablet 3    metronidazole (FLAGYL) 500 MG tablet Take 1 tablet (500 mg total) by mouth every 8 (eight) hours. 90 tablet 1    miconazole (MICOTIN) 2 % cream Apply topically 2 (two) times daily. Apply to left foot after thorough cleaning with soap and water. Need to dry foot completely prior to placing antifungal cream on foot.  0    oxycodone (ROXICODONE) 5 MG immediate release tablet Take 1 tablet (5 mg total) by mouth every 6 (six) hours as needed for Pain. 10 tablet 0     No current facility-administered medications for this visit.        Review of patient's allergies indicates:  No Known Allergies      Review of Systems   Constitution:  Negative for chills, fever and malaise/fatigue.   Cardiovascular: Negative for chest pain, leg swelling, orthopnea and palpitations.   Respiratory: Negative for cough, shortness of breath and wheezing.    Skin: Positive for color change, dry skin and nail changes. Negative for itching, poor wound healing and rash.   Musculoskeletal: Negative for arthritis, gout, joint pain, joint swelling, muscle weakness and myalgias.   Neurological: Positive for numbness, paresthesias and sensory change. Negative for disturbances in coordination, dizziness, focal weakness and tremors.           Objective:      Physical Exam   Cardiovascular:   Dorsalis pedis and posterior tibial pulses are diminished Bilaterally. Toes are cool to touch. Feet are warm proximally.There is decreased digital hair. Skin is atrophic, slightly hyperpigmented, and mildly edematous       Musculoskeletal:   Partial first ray amputation and 5th toe amputation, right. Mild forefoot edema. Ankle dorsiflexion to 5 degrees past neutral with knee extended, right.        Neurological:   Strafford-Nenita 5.07 monofilamant testing is diminished both feet. Sharp/dull sensation diminished Bilaterally. Light touch absent Bilaterally.       Skin: Skin is dry and intact.   Right foot amputation site and achilles tendon incisions well opposed, no dehiscence, erythema or edema.                  Assessment:       Encounter Diagnoses   Name Primary?    Post-operative state Yes    History of amputation of hallux     Subacute osteomyelitis of right foot          Plan:       Indio was seen today for post-op evaluation.    Diagnoses and all orders for this visit:    Post-operative state    History of amputation of hallux    Subacute osteomyelitis of right foot      I counseled the patient on his conditions, their implications and medical management.    Sutures and staples removed, right.  Compression dressing and tall CAM boot applied.    NWB with crutches.     Complete  course of abx per ID.    F/u 1 week.    - Shoe inspection. Diabetic Foot Education. Patient reminded of the importance of good nutrition and blood sugar control to help prevent podiatric complications of diabetes. Patient instructed on proper foot hygeine. We discussed wearing proper shoe gear, daily foot inspections, never walking without protective shoe gear, never putting sharp instruments to feet,

## 2017-02-17 ENCOUNTER — OFFICE VISIT (OUTPATIENT)
Dept: PODIATRY | Facility: CLINIC | Age: 47
End: 2017-02-17
Payer: MEDICAID

## 2017-02-17 VITALS — DIASTOLIC BLOOD PRESSURE: 97 MMHG | HEIGHT: 72 IN | SYSTOLIC BLOOD PRESSURE: 159 MMHG | HEART RATE: 99 BPM

## 2017-02-17 DIAGNOSIS — Z98.890 POST-OPERATIVE STATE: Primary | ICD-10-CM

## 2017-02-17 DIAGNOSIS — M86.271 SUBACUTE OSTEOMYELITIS OF RIGHT FOOT: ICD-10-CM

## 2017-02-17 DIAGNOSIS — Z89.419 HISTORY OF AMPUTATION OF HALLUX: ICD-10-CM

## 2017-02-17 PROCEDURE — 99024 POSTOP FOLLOW-UP VISIT: CPT | Mod: ,,, | Performed by: PODIATRIST

## 2017-02-17 PROCEDURE — 99999 PR PBB SHADOW E&M-EST. PATIENT-LVL II: CPT | Mod: PBBFAC,,, | Performed by: PODIATRIST

## 2017-02-17 PROCEDURE — 99212 OFFICE O/P EST SF 10 MIN: CPT | Mod: PBBFAC | Performed by: PODIATRIST

## 2017-02-17 NOTE — PROGRESS NOTES
Subjective:      Patient ID: Indio Ryder Jr. is a 46 y.o. male.    Chief Complaint: Post-op Evaluation (right ft.)    Indio is a 46 y.o. male who presents to the clinic for evaluation and treatment of high risk feet. Indio has a past medical history of Diabetes mellitus type II; Diabetic foot ulcer; Hyperlipidemia; Hypertension; Peripheral neuropathy; Subacute osteomyelitis of right foot (1/12/2017); and Ulcerative colitis, unspecified. He is now 4 weeks s/p I&D, partial first ray amputation and RUSLAN, right. He has been NWB with crutches. Dressing change, eval by Dr. Leone last week. Kept CAM boot dry and intact. Taking PO abx, amoxicillin, flagyl without complaint with plan for 6 weeks per ID recs. Denies calf pain or swelling. No new concerns.   PCP: Lorena Thakur MD        Hemoglobin A1C   Date Value Ref Range Status   01/12/2017 15.0 (H) 4.5 - 6.2 % Final     Comment:     According to ADA guidelines, hemoglobin A1C <7.0% represents  optimal control in non-pregnant diabetic patients.  Different  metrics may apply to specific populations.   Standards of Medical Care in Diabetes - 2016.  For the purpose of screening for the presence of diabetes:  <5.7%     Consistent with the absence of diabetes  5.7-6.4%  Consistent with increasing risk for diabetes   (prediabetes)  >or=6.5%  Consistent with diabetes  Currently no consensus exists for use of hemoglobin A1C  for diagnosis of diabetes for children.     05/03/2016 9.0 (H) 4.5 - 6.2 % Final   10/29/2015 7.6 (H) 4.5 - 6.2 % Final     Past Medical History   Diagnosis Date    Diabetes mellitus type II     Diabetic foot ulcer     Hyperlipidemia     Hypertension     Peripheral neuropathy     Subacute osteomyelitis of right foot 1/12/2017     Streptococcus agalactiae, Actinomyces odontolyticus    Ulcerative colitis, unspecified        Past Surgical History   Procedure Laterality Date    Toe amputation Right 06/05/2015    Toe amputation Right 01/19/2017        Family History   Problem Relation Age of Onset    Adopted: Yes    Hypertension Mother     Cancer Mother      breast    Diabetes Mother     Hypertension Father     Cataracts Father     Diabetes Sister     Hypertension Maternal Aunt        Social History     Social History    Marital status: Single     Spouse name: N/A    Number of children: 0    Years of education: N/A     Occupational History     [employer]     Social History Main Topics    Smoking status: Never Smoker    Smokeless tobacco: None    Alcohol use No    Drug use: No    Sexual activity: No     Other Topics Concern    None     Social History Narrative       Current Outpatient Prescriptions   Medication Sig Dispense Refill    amoxicillin (AMOXIL) 500 MG capsule Take 1 capsule (500 mg total) by mouth every 8 (eight) hours. 90 capsule 1    fluconazole (DIFLUCAN) 200 MG Tab Take 4 tablets (800 mg total) by mouth once daily. 120 tablet 1    insulin glargine (LANTUS SOLOSTAR) 100 unit/mL (3 mL) InPn pen Inject 26 Units into the skin every evening. 35 Insulin Pen Subcutaneous daily (Patient taking differently: Inject 25 Units into the skin every evening. ) 1 Box 3    metformin (GLUCOPHAGE-XR) 500 MG 24 hr tablet TAKE TWO TABLETS BY MOUTH TWICE A DAY WITH MEALS 120 tablet 3    metronidazole (FLAGYL) 500 MG tablet Take 1 tablet (500 mg total) by mouth every 8 (eight) hours. 90 tablet 1    miconazole (MICOTIN) 2 % cream Apply topically 2 (two) times daily. Apply to left foot after thorough cleaning with soap and water. Need to dry foot completely prior to placing antifungal cream on foot.  0    oxycodone (ROXICODONE) 5 MG immediate release tablet Take 1 tablet (5 mg total) by mouth every 6 (six) hours as needed for Pain. 10 tablet 0     No current facility-administered medications for this visit.        Review of patient's allergies indicates:  No Known Allergies      Review of Systems   Constitution: Negative for chills, fever  and malaise/fatigue.   Cardiovascular: Negative for chest pain, leg swelling, orthopnea and palpitations.   Respiratory: Negative for cough, shortness of breath and wheezing.    Skin: Positive for color change, dry skin and nail changes. Negative for itching, poor wound healing and rash.   Musculoskeletal: Negative for arthritis, gout, joint pain, joint swelling, muscle weakness and myalgias.   Neurological: Positive for numbness, paresthesias and sensory change. Negative for disturbances in coordination, dizziness, focal weakness and tremors.           Objective:      Physical Exam   Cardiovascular:   Dorsalis pedis and posterior tibial pulses are diminished Bilaterally. Toes are cool to touch. Feet are warm proximally.There is decreased digital hair. Skin is atrophic, slightly hyperpigmented, and mildly edematous       Musculoskeletal:   Partial first ray amputation and 5th toe amputation, right. Mild forefoot edema. Ankle dorsiflexion to 5 degrees past neutral with knee extended, right.        Neurological:   Errol-Nenita 5.07 monofilamant testing is diminished both feet. Sharp/dull sensation diminished Bilaterally. Light touch absent Bilaterally.       Skin: Skin is dry and intact.   Right foot amputation site and achilles tendon incisions well opposed, no dehiscence, erythema or edema.                  Assessment:       Encounter Diagnoses   Name Primary?    Post-operative state Yes    History of amputation of hallux     Subacute osteomyelitis of right foot          Plan:       Indio was seen today for post-op evaluation.    Diagnoses and all orders for this visit:    Post-operative state    History of amputation of hallux    Subacute osteomyelitis of right foot    I counseled the patient on his conditions, their implications and medical management.    Compression dressing and tall CAM boot applied.    NWB with crutches.     Complete course of abx per ID.    F/u 1 week.    - Shoe inspection. Diabetic  Foot Education. Patient reminded of the importance of good nutrition and blood sugar control to help prevent podiatric complications of diabetes. Patient instructed on proper foot hygeine. We discussed wearing proper shoe gear, daily foot inspections, never walking without protective shoe gear, never putting sharp instruments to feet,

## 2017-02-22 LAB
FUNGUS SPEC CULT: NORMAL

## 2017-02-24 ENCOUNTER — OFFICE VISIT (OUTPATIENT)
Dept: PODIATRY | Facility: CLINIC | Age: 47
End: 2017-02-24
Payer: MEDICAID

## 2017-02-24 VITALS — HEIGHT: 72 IN | SYSTOLIC BLOOD PRESSURE: 166 MMHG | HEART RATE: 99 BPM | DIASTOLIC BLOOD PRESSURE: 100 MMHG

## 2017-02-24 DIAGNOSIS — Z98.890 POST-OPERATIVE STATE: Primary | ICD-10-CM

## 2017-02-24 DIAGNOSIS — L97.519 DIABETIC ULCER OF RIGHT FOOT ASSOCIATED WITH TYPE 2 DIABETES MELLITUS: ICD-10-CM

## 2017-02-24 DIAGNOSIS — Z89.419 HISTORY OF AMPUTATION OF HALLUX: ICD-10-CM

## 2017-02-24 DIAGNOSIS — E11.621 DIABETIC ULCER OF RIGHT FOOT ASSOCIATED WITH TYPE 2 DIABETES MELLITUS: ICD-10-CM

## 2017-02-24 DIAGNOSIS — M86.271 SUBACUTE OSTEOMYELITIS OF RIGHT FOOT: ICD-10-CM

## 2017-02-24 PROCEDURE — 99213 OFFICE O/P EST LOW 20 MIN: CPT | Mod: PBBFAC | Performed by: PODIATRIST

## 2017-02-24 PROCEDURE — 99024 POSTOP FOLLOW-UP VISIT: CPT | Mod: ,,, | Performed by: PODIATRIST

## 2017-02-24 PROCEDURE — 99999 PR PBB SHADOW E&M-EST. PATIENT-LVL III: CPT | Mod: PBBFAC,,, | Performed by: PODIATRIST

## 2017-02-24 NOTE — PROGRESS NOTES
Subjective:      Patient ID: Indio Ryder Jr. is a 46 y.o. male.    Chief Complaint: Post-op Evaluation (right ft./PCP Dr. Thakur 10/29/15)    Indio is a 46 y.o. male who presents to the clinic for evaluation and treatment of high risk feet. Indio has a past medical history of Diabetes mellitus type II; Diabetic foot ulcer; Hyperlipidemia; Hypertension; Peripheral neuropathy; Subacute osteomyelitis of right foot (1/12/2017); and Ulcerative colitis, unspecified. He is now 5 weeks s/p I&D, partial first ray amputation and RUSLAN, right. He has been NWB with crutches.  Taking PO abx, amoxicillin, flagyl without complaint with plan for 6 weeks per ID recs. Denies calf pain or swelling. No new concerns.   PCP: Lorena Thakur MD        Hemoglobin A1C   Date Value Ref Range Status   01/12/2017 15.0 (H) 4.5 - 6.2 % Final     Comment:     According to ADA guidelines, hemoglobin A1C <7.0% represents  optimal control in non-pregnant diabetic patients.  Different  metrics may apply to specific populations.   Standards of Medical Care in Diabetes - 2016.  For the purpose of screening for the presence of diabetes:  <5.7%     Consistent with the absence of diabetes  5.7-6.4%  Consistent with increasing risk for diabetes   (prediabetes)  >or=6.5%  Consistent with diabetes  Currently no consensus exists for use of hemoglobin A1C  for diagnosis of diabetes for children.     05/03/2016 9.0 (H) 4.5 - 6.2 % Final   10/29/2015 7.6 (H) 4.5 - 6.2 % Final     Past Medical History:   Diagnosis Date    Diabetes mellitus type II     Diabetic foot ulcer     Hyperlipidemia     Hypertension     Peripheral neuropathy     Subacute osteomyelitis of right foot 1/12/2017    Streptococcus agalactiae, Actinomyces odontolyticus    Ulcerative colitis, unspecified        Past Surgical History:   Procedure Laterality Date    TOE AMPUTATION Right 06/05/2015    TOE AMPUTATION Right 01/19/2017       Family History   Problem Relation Age of  Onset    Adopted: Yes    Hypertension Mother     Cancer Mother      breast    Diabetes Mother     Hypertension Father     Cataracts Father     Diabetes Sister     Hypertension Maternal Aunt        Social History     Social History    Marital status: Single     Spouse name: N/A    Number of children: 0    Years of education: N/A     Occupational History     [employer]     Social History Main Topics    Smoking status: Never Smoker    Smokeless tobacco: None    Alcohol use No    Drug use: No    Sexual activity: No     Other Topics Concern    None     Social History Narrative       Current Outpatient Prescriptions   Medication Sig Dispense Refill    insulin glargine (LANTUS SOLOSTAR) 100 unit/mL (3 mL) InPn pen Inject 26 Units into the skin every evening. 35 Insulin Pen Subcutaneous daily (Patient taking differently: Inject 25 Units into the skin every evening. ) 1 Box 3    metformin (GLUCOPHAGE-XR) 500 MG 24 hr tablet TAKE TWO TABLETS BY MOUTH TWICE A DAY WITH MEALS 120 tablet 3    miconazole (MICOTIN) 2 % cream Apply topically 2 (two) times daily. Apply to left foot after thorough cleaning with soap and water. Need to dry foot completely prior to placing antifungal cream on foot.  0    oxycodone (ROXICODONE) 5 MG immediate release tablet Take 1 tablet (5 mg total) by mouth every 6 (six) hours as needed for Pain. 10 tablet 0     No current facility-administered medications for this visit.        Review of patient's allergies indicates:  No Known Allergies      Review of Systems   Constitution: Negative for chills, fever and malaise/fatigue.   Cardiovascular: Negative for chest pain, leg swelling, orthopnea and palpitations.   Respiratory: Negative for cough, shortness of breath and wheezing.    Skin: Positive for color change, dry skin and nail changes. Negative for itching, poor wound healing and rash.   Musculoskeletal: Negative for arthritis, gout, joint pain, joint swelling, muscle  weakness and myalgias.   Neurological: Positive for numbness, paresthesias and sensory change. Negative for disturbances in coordination, dizziness, focal weakness and tremors.           Objective:      Physical Exam   Cardiovascular:   Dorsalis pedis and posterior tibial pulses are diminished Bilaterally. Toes are cool to touch. Feet are warm proximally.There is decreased digital hair. Skin is atrophic, slightly hyperpigmented, and mildly edematous       Musculoskeletal:   Partial first ray amputation and 5th toe amputation, right. Mild forefoot edema. Ankle dorsiflexion to 5 degrees past neutral with knee extended, right.        Neurological:   [place]-Nenita 5.07 monofilamant testing is diminished both feet. Sharp/dull sensation diminished Bilaterally. Light touch absent Bilaterally.       Skin: Skin is dry and intact.   Small ulcer, dehiscence measuring 2.0 x 0.2 x 0.2 mm at plantar 1st ray amputation site. Fibrogranular wound base. No signs of infection.                  Assessment:       Encounter Diagnoses   Name Primary?    Post-operative state Yes    History of amputation of hallux     Subacute osteomyelitis of right foot     Diabetic ulcer of right foot associated with type 2 diabetes mellitus          Plan:       Indio was seen today for post-op evaluation.    Diagnoses and all orders for this visit:    Post-operative state    History of amputation of hallux    Subacute osteomyelitis of right foot    Diabetic ulcer of right foot associated with type 2 diabetes mellitus      I counseled the patient on his conditions, their implications and medical management.    Right foot wound debrided, cleansed, dressed with eusebia, foam.    Offload with TCC    NWB with crutches.     Complete course of abx per ID.    F/u 1 week.        - Ordered application of a new total contact below the knee cast to be applied to the affected foot. With patient's permission, Arcenio Duarte, orthotist,  applied a new total  contact cast to the affected foot under my direct supervision. Pt. tolerated well and demonstrated stability with ambulation. Arcenio used 4 rolls of fiberglass and 3 rolls of plaster for the total contact cast.       - Obtained verbal consent from the patient for excisional wound debridement, right plantar foot. No anesthesia was required, Utilizing a sterile curet, all non-viable soft tissue was excised to level of health subcutaneous tissue. A healthy appearing wound base was achieved with minimal blood loss. Hemostasis achieved with compression. Wound site was irrigated with normal saline and dressed. Wound care instructions were reviewed with the patient. Patient tolerated the procedure well.      - Shoe inspection. Diabetic Foot Education. Patient reminded of the importance of good nutrition and blood sugar control to help prevent podiatric complications of diabetes. Patient instructed on proper foot hygeine. We discussed wearing proper shoe gear, daily foot inspections, never walking without protective shoe gear, never putting sharp instruments to feet,

## 2017-03-03 ENCOUNTER — OFFICE VISIT (OUTPATIENT)
Dept: PODIATRY | Facility: CLINIC | Age: 47
End: 2017-03-03
Payer: MEDICAID

## 2017-03-03 VITALS — HEIGHT: 72 IN | HEART RATE: 105 BPM | DIASTOLIC BLOOD PRESSURE: 95 MMHG | SYSTOLIC BLOOD PRESSURE: 152 MMHG

## 2017-03-03 DIAGNOSIS — Z89.419 HISTORY OF AMPUTATION OF HALLUX: ICD-10-CM

## 2017-03-03 DIAGNOSIS — L97.519 DIABETIC ULCER OF RIGHT FOOT ASSOCIATED WITH TYPE 2 DIABETES MELLITUS: ICD-10-CM

## 2017-03-03 DIAGNOSIS — E11.621 DIABETIC ULCER OF RIGHT FOOT ASSOCIATED WITH TYPE 2 DIABETES MELLITUS: ICD-10-CM

## 2017-03-03 DIAGNOSIS — Z98.890 POST-OPERATIVE STATE: Primary | ICD-10-CM

## 2017-03-03 PROCEDURE — 99213 OFFICE O/P EST LOW 20 MIN: CPT | Mod: PBBFAC | Performed by: PODIATRIST

## 2017-03-03 PROCEDURE — 99024 POSTOP FOLLOW-UP VISIT: CPT | Mod: ,,, | Performed by: PODIATRIST

## 2017-03-03 PROCEDURE — 99999 PR PBB SHADOW E&M-EST. PATIENT-LVL III: CPT | Mod: PBBFAC,,, | Performed by: PODIATRIST

## 2017-03-04 NOTE — PROGRESS NOTES
Subjective:      Patient ID: Indio Ryder Jr. is a 46 y.o. male.    Chief Complaint: Post-op Evaluation (PCP Dr. Thakur)    Indio is a 46 y.o. male who presents to the clinic for evaluation and treatment of high risk feet. Indio has a past medical history of Diabetes mellitus type II; Diabetic foot ulcer; Hyperlipidemia; Hypertension; Peripheral neuropathy; Subacute osteomyelitis of right foot (1/12/2017); and Ulcerative colitis, unspecified. He is now 6 weeks s/p I&D, partial first ray amputation and RUSLAN, right.  Taking PO abx, amoxicillin, flagyl without complaint with plan for 6 weeks per ID recs. Denies calf pain or swelling. No new concerns. Admits he was on his feet a lot this week for [employer].    PCP: Lorena Thakur MD        Hemoglobin A1C   Date Value Ref Range Status   01/12/2017 15.0 (H) 4.5 - 6.2 % Final     Comment:     According to ADA guidelines, hemoglobin A1C <7.0% represents  optimal control in non-pregnant diabetic patients.  Different  metrics may apply to specific populations.   Standards of Medical Care in Diabetes - 2016.  For the purpose of screening for the presence of diabetes:  <5.7%     Consistent with the absence of diabetes  5.7-6.4%  Consistent with increasing risk for diabetes   (prediabetes)  >or=6.5%  Consistent with diabetes  Currently no consensus exists for use of hemoglobin A1C  for diagnosis of diabetes for children.     05/03/2016 9.0 (H) 4.5 - 6.2 % Final   10/29/2015 7.6 (H) 4.5 - 6.2 % Final     Past Medical History:   Diagnosis Date    Diabetes mellitus type II     Diabetic foot ulcer     Hyperlipidemia     Hypertension     Peripheral neuropathy     Subacute osteomyelitis of right foot 1/12/2017    Streptococcus agalactiae, Actinomyces odontolyticus    Ulcerative colitis, unspecified        Past Surgical History:   Procedure Laterality Date    TOE AMPUTATION Right 06/05/2015    TOE AMPUTATION Right 01/19/2017       Family History   Problem Relation  Age of Onset    Adopted: Yes    Hypertension Mother     Cancer Mother      breast    Diabetes Mother     Hypertension Father     Cataracts Father     Diabetes Sister     Hypertension Maternal Aunt        Social History     Social History    Marital status: Single     Spouse name: N/A    Number of children: 0    Years of education: N/A     Occupational History     [employer]     Social History Main Topics    Smoking status: Never Smoker    Smokeless tobacco: None    Alcohol use No    Drug use: No    Sexual activity: No     Other Topics Concern    None     Social History Narrative       Current Outpatient Prescriptions   Medication Sig Dispense Refill    insulin glargine (LANTUS SOLOSTAR) 100 unit/mL (3 mL) InPn pen Inject 26 Units into the skin every evening. 35 Insulin Pen Subcutaneous daily (Patient taking differently: Inject 25 Units into the skin every evening. ) 1 Box 3    metformin (GLUCOPHAGE-XR) 500 MG 24 hr tablet TAKE TWO TABLETS BY MOUTH TWICE A DAY WITH MEALS 120 tablet 3    miconazole (MICOTIN) 2 % cream Apply topically 2 (two) times daily. Apply to left foot after thorough cleaning with soap and water. Need to dry foot completely prior to placing antifungal cream on foot.  0    oxycodone (ROXICODONE) 5 MG immediate release tablet Take 1 tablet (5 mg total) by mouth every 6 (six) hours as needed for Pain. 10 tablet 0     No current facility-administered medications for this visit.        Review of patient's allergies indicates:  No Known Allergies      Review of Systems   Constitution: Negative for chills, fever and malaise/fatigue.   Cardiovascular: Negative for chest pain, leg swelling, orthopnea and palpitations.   Respiratory: Negative for cough, shortness of breath and wheezing.    Skin: Positive for color change, dry skin and nail changes. Negative for itching, poor wound healing and rash.   Musculoskeletal: Negative for arthritis, gout, joint pain, joint swelling, muscle  weakness and myalgias.   Neurological: Positive for numbness, paresthesias and sensory change. Negative for disturbances in coordination, dizziness, focal weakness and tremors.           Objective:      Physical Exam   Cardiovascular:   Dorsalis pedis and posterior tibial pulses are diminished Bilaterally. Toes are cool to touch. Feet are warm proximally.There is decreased digital hair. Skin is atrophic, slightly hyperpigmented, and mildly edematous       Musculoskeletal:   Partial first ray amputation and 5th toe amputation, right. Mild forefoot edema. Ankle dorsiflexion to 5 degrees past neutral with knee extended, right.        Neurological:   [place]-Nenita 5.07 monofilamant testing is diminished both feet. Sharp/dull sensation diminished Bilaterally. Light touch absent Bilaterally.       Skin: Skin is dry and intact.   Small ulcer/fissue, dehiscence measuring 2.0 x 0.1 x 0.2 mm at plantar 1st ray amputation site. Granular wound base. No signs of infection. The skin edges appose nicely without tension.                  Assessment:       Encounter Diagnoses   Name Primary?    Post-operative state Yes    History of amputation of hallux     Diabetic ulcer of right foot associated with type 2 diabetes mellitus          Plan:       Indio was seen today for post-op evaluation.    Diagnoses and all orders for this visit:    Post-operative state    History of amputation of hallux    Diabetic ulcer of right foot associated with type 2 diabetes mellitus      I counseled the patient on his conditions, their implications and medical management.    Right foot wound debrided, cleansed, skin edges secured with steristrips     Offload with TCC    NWB with crutches.     Complete course of abx per ID.    F/u 1 week.        - Ordered application of a new total contact below the knee cast to be applied to the affected foot. With patient's permission, Arcenio Duarte, orthotist,  applied a new total contact cast to the affected  foot under my direct supervision. Pt. tolerated well and demonstrated stability with ambulation. Arcenio used 4 rolls of fiberglass and 3 rolls of plaster for the total contact cast.       - Obtained verbal consent from the patient for excisional wound debridement, right plantar foot. No anesthesia was required, Utilizing a sterile curet, all non-viable soft tissue was excised to level of health subcutaneous tissue. A healthy appearing wound base was achieved with minimal blood loss. Hemostasis achieved with compression. Wound site was irrigated with normal saline and dressed. Wound care instructions were reviewed with the patient. Patient tolerated the procedure well.      - Shoe inspection. Diabetic Foot Education. Patient reminded of the importance of good nutrition and blood sugar control to help prevent podiatric complications of diabetes. Patient instructed on proper foot hygeine. We discussed wearing proper shoe gear, daily foot inspections, never walking without protective shoe gear, never putting sharp instruments to feet,

## 2017-03-10 ENCOUNTER — OFFICE VISIT (OUTPATIENT)
Dept: PODIATRY | Facility: CLINIC | Age: 47
End: 2017-03-10
Payer: MEDICAID

## 2017-03-10 VITALS — SYSTOLIC BLOOD PRESSURE: 149 MMHG | DIASTOLIC BLOOD PRESSURE: 92 MMHG | HEIGHT: 72 IN | HEART RATE: 99 BPM

## 2017-03-10 DIAGNOSIS — Z98.890 POST-OPERATIVE STATE: Primary | ICD-10-CM

## 2017-03-10 PROCEDURE — 99999 PR PBB SHADOW E&M-EST. PATIENT-LVL III: CPT | Mod: PBBFAC,,, | Performed by: PODIATRIST

## 2017-03-10 PROCEDURE — 99213 OFFICE O/P EST LOW 20 MIN: CPT | Mod: PBBFAC | Performed by: PODIATRIST

## 2017-03-10 PROCEDURE — 99024 POSTOP FOLLOW-UP VISIT: CPT | Mod: ,,, | Performed by: PODIATRIST

## 2017-03-16 NOTE — PROGRESS NOTES
Subjective:      Patient ID: Indio Ryder Jr. is a 46 y.o. male.    Chief Complaint: Post-op Evaluation (right ft./ PCP Dr. Thakur 10/29/15)    Indio is a 46 y.o. male who presents to the clinic for evaluation and treatment of high risk feet. Indio has a past medical history of Diabetes mellitus type II; Diabetic foot ulcer; Hyperlipidemia; Hypertension; Peripheral neuropathy; Subacute osteomyelitis of right foot (1/12/2017); and Ulcerative colitis, unspecified. He is now 7 weeks s/p I&D, partial first ray amputation and RUSLAN, right.  Scheduled to complete PO abx, amoxicillin, flagyl this week. Denies calf pain or swelling. No new concerns.      PCP: Lorena Thakur MD        Hemoglobin A1C   Date Value Ref Range Status   01/12/2017 15.0 (H) 4.5 - 6.2 % Final     Comment:     According to ADA guidelines, hemoglobin A1C <7.0% represents  optimal control in non-pregnant diabetic patients.  Different  metrics may apply to specific populations.   Standards of Medical Care in Diabetes - 2016.  For the purpose of screening for the presence of diabetes:  <5.7%     Consistent with the absence of diabetes  5.7-6.4%  Consistent with increasing risk for diabetes   (prediabetes)  >or=6.5%  Consistent with diabetes  Currently no consensus exists for use of hemoglobin A1C  for diagnosis of diabetes for children.     05/03/2016 9.0 (H) 4.5 - 6.2 % Final   10/29/2015 7.6 (H) 4.5 - 6.2 % Final     Past Medical History:   Diagnosis Date    Diabetes mellitus type II     Diabetic foot ulcer     Hyperlipidemia     Hypertension     Peripheral neuropathy     Subacute osteomyelitis of right foot 1/12/2017    Streptococcus agalactiae, Actinomyces odontolyticus    Ulcerative colitis, unspecified        Past Surgical History:   Procedure Laterality Date    TOE AMPUTATION Right 06/05/2015    TOE AMPUTATION Right 01/19/2017       Family History   Problem Relation Age of Onset    Adopted: Yes    Hypertension Mother      Cancer Mother      breast    Diabetes Mother     Hypertension Father     Cataracts Father     Diabetes Sister     Hypertension Maternal Aunt        Social History     Social History    Marital status: Single     Spouse name: N/A    Number of children: 0    Years of education: N/A     Occupational History     [employer]     Social History Main Topics    Smoking status: Never Smoker    Smokeless tobacco: None    Alcohol use No    Drug use: No    Sexual activity: No     Other Topics Concern    None     Social History Narrative       Current Outpatient Prescriptions   Medication Sig Dispense Refill    insulin glargine (LANTUS SOLOSTAR) 100 unit/mL (3 mL) InPn pen Inject 26 Units into the skin every evening. 35 Insulin Pen Subcutaneous daily (Patient taking differently: Inject 25 Units into the skin every evening. ) 1 Box 3    metformin (GLUCOPHAGE-XR) 500 MG 24 hr tablet TAKE TWO TABLETS BY MOUTH TWICE A DAY WITH MEALS 120 tablet 3    miconazole (MICOTIN) 2 % cream Apply topically 2 (two) times daily. Apply to left foot after thorough cleaning with soap and water. Need to dry foot completely prior to placing antifungal cream on foot.  0    oxycodone (ROXICODONE) 5 MG immediate release tablet Take 1 tablet (5 mg total) by mouth every 6 (six) hours as needed for Pain. 10 tablet 0     No current facility-administered medications for this visit.        Review of patient's allergies indicates:  No Known Allergies      Review of Systems   Constitution: Negative for chills, fever and malaise/fatigue.   Cardiovascular: Negative for chest pain, leg swelling, orthopnea and palpitations.   Respiratory: Negative for cough, shortness of breath and wheezing.    Skin: Positive for color change, dry skin and nail changes. Negative for itching, poor wound healing and rash.   Musculoskeletal: Negative for arthritis, gout, joint pain, joint swelling, muscle weakness and myalgias.   Neurological: Positive for  numbness, paresthesias and sensory change. Negative for disturbances in coordination, dizziness, focal weakness and tremors.           Objective:      Physical Exam   Cardiovascular:   Dorsalis pedis and posterior tibial pulses are diminished Bilaterally. Toes are cool to touch. Feet are warm proximally.There is decreased digital hair. Skin is atrophic, slightly hyperpigmented, and mildly edematous       Musculoskeletal:   Partial first ray amputation and 5th toe amputation, right. Mild forefoot edema. Ankle dorsiflexion to 5 degrees past neutral with knee extended, right.        Neurological:   [place]-Nenita 5.07 monofilamant testing is diminished both feet. Sharp/dull sensation diminished Bilaterally. Light touch absent Bilaterally.       Skin: Skin is dry and intact.   Small ulcer/fissue, dehiscence measuring 0.4 x 0.1 x 0.1 mm at plantar 1st ray amputation site. Granular wound base. No signs of infection.             Assessment:       Encounter Diagnosis   Name Primary?    Post-operative state Yes   Wound is improving, no signs of infection.       Plan:       Indio was seen today for post-op evaluation.    Diagnoses and all orders for this visit:    Post-operative state      I counseled the patient on his conditions, their implications and medical management.    Right foot wound debrided, cleansed, dressed with eusebia.     Offload with TCC    NWB with crutches.     Complete course of abx per ID.    F/u 1 week.        - Ordered application of a new total contact below the knee cast to be applied to the affected foot. With patient's permission, Arcenio Duarte, orthotist,  applied a new total contact cast to the affected foot under my direct supervision. Pt. tolerated well and demonstrated stability with ambulation. Arcenio used 4 rolls of fiberglass and 3 rolls of plaster for the total contact cast.     - Obtained verbal consent from the patient for excisional wound debridement, right plantar foot. No anesthesia  was required, Utilizing a sterile curet, all non-viable soft tissue was excised to level of health subcutaneous tissue. A healthy appearing wound base was achieved with minimal blood loss. Hemostasis achieved with compression. Wound site was irrigated with normal saline and dressed. Wound care instructions were reviewed with the patient. Patient tolerated the procedure well.    - Shoe inspection. Diabetic Foot Education. Patient reminded of the importance of good nutrition and blood sugar control to help prevent podiatric complications of diabetes. Patient instructed on proper foot hygeine. We discussed wearing proper shoe gear, daily foot inspections, never walking without protective shoe gear, never putting sharp instruments to feet,

## 2017-03-17 ENCOUNTER — OFFICE VISIT (OUTPATIENT)
Dept: PODIATRY | Facility: CLINIC | Age: 47
End: 2017-03-17
Payer: MEDICAID

## 2017-03-17 VITALS — SYSTOLIC BLOOD PRESSURE: 155 MMHG | HEIGHT: 72 IN | DIASTOLIC BLOOD PRESSURE: 94 MMHG | HEART RATE: 97 BPM

## 2017-03-17 DIAGNOSIS — Z98.890 POST-OPERATIVE STATE: Primary | ICD-10-CM

## 2017-03-17 PROCEDURE — 99024 POSTOP FOLLOW-UP VISIT: CPT | Mod: ,,, | Performed by: PODIATRIST

## 2017-03-17 PROCEDURE — 99213 OFFICE O/P EST LOW 20 MIN: CPT | Mod: PBBFAC | Performed by: PODIATRIST

## 2017-03-17 PROCEDURE — 99999 PR PBB SHADOW E&M-EST. PATIENT-LVL III: CPT | Mod: PBBFAC,,, | Performed by: PODIATRIST

## 2017-03-17 NOTE — PROGRESS NOTES
Subjective:      Patient ID: Indio Ryder Jr. is a 46 y.o. male.    Chief Complaint: Post-op Evaluation (PCP Dr. Thakur); Cast Removal; Follow-up; and Dressing Change    Indio is a 46 y.o. male who presents to the clinic for evaluation and treatment of high risk feet. Indio has a past medical history of Diabetes mellitus type II; Diabetic foot ulcer; Hyperlipidemia; Hypertension; Peripheral neuropathy; Subacute osteomyelitis of right foot (1/12/2017); and Ulcerative colitis, unspecified. He is now 8 weeks s/p I&D, partial first ray amputation and RUSLAN, right.  Denies calf pain or swelling. No new concerns.      PCP: Lorena Thakur MD        Hemoglobin A1C   Date Value Ref Range Status   01/12/2017 15.0 (H) 4.5 - 6.2 % Final     Comment:     According to ADA guidelines, hemoglobin A1C <7.0% represents  optimal control in non-pregnant diabetic patients.  Different  metrics may apply to specific populations.   Standards of Medical Care in Diabetes - 2016.  For the purpose of screening for the presence of diabetes:  <5.7%     Consistent with the absence of diabetes  5.7-6.4%  Consistent with increasing risk for diabetes   (prediabetes)  >or=6.5%  Consistent with diabetes  Currently no consensus exists for use of hemoglobin A1C  for diagnosis of diabetes for children.     05/03/2016 9.0 (H) 4.5 - 6.2 % Final   10/29/2015 7.6 (H) 4.5 - 6.2 % Final     Past Medical History:   Diagnosis Date    Diabetes mellitus type II     Diabetic foot ulcer     Hyperlipidemia     Hypertension     Peripheral neuropathy     Subacute osteomyelitis of right foot 1/12/2017    Streptococcus agalactiae, Actinomyces odontolyticus    Ulcerative colitis, unspecified        Past Surgical History:   Procedure Laterality Date    TOE AMPUTATION Right 06/05/2015    TOE AMPUTATION Right 01/19/2017       Family History   Problem Relation Age of Onset    Adopted: Yes    Hypertension Mother     Cancer Mother      breast    Diabetes  Mother     Hypertension Father     Cataracts Father     Diabetes Sister     Hypertension Maternal Aunt        Social History     Social History    Marital status: Single     Spouse name: N/A    Number of children: 0    Years of education: N/A     Occupational History     [employer]     Social History Main Topics    Smoking status: Never Smoker    Smokeless tobacco: None    Alcohol use No    Drug use: No    Sexual activity: No     Other Topics Concern    None     Social History Narrative       Current Outpatient Prescriptions   Medication Sig Dispense Refill    insulin glargine (LANTUS SOLOSTAR) 100 unit/mL (3 mL) InPn pen Inject 26 Units into the skin every evening. 35 Insulin Pen Subcutaneous daily (Patient taking differently: Inject 25 Units into the skin every evening. ) 1 Box 3    metformin (GLUCOPHAGE-XR) 500 MG 24 hr tablet TAKE TWO TABLETS BY MOUTH TWICE A DAY WITH MEALS 120 tablet 3    miconazole (MICOTIN) 2 % cream Apply topically 2 (two) times daily. Apply to left foot after thorough cleaning with soap and water. Need to dry foot completely prior to placing antifungal cream on foot.  0    oxycodone (ROXICODONE) 5 MG immediate release tablet Take 1 tablet (5 mg total) by mouth every 6 (six) hours as needed for Pain. 10 tablet 0     No current facility-administered medications for this visit.        Review of patient's allergies indicates:  No Known Allergies      Review of Systems   Constitution: Negative for chills, fever and malaise/fatigue.   Cardiovascular: Negative for chest pain, leg swelling, orthopnea and palpitations.   Respiratory: Negative for cough, shortness of breath and wheezing.    Skin: Positive for color change, dry skin and nail changes. Negative for itching, poor wound healing and rash.   Musculoskeletal: Negative for arthritis, gout, joint pain, joint swelling, muscle weakness and myalgias.   Neurological: Positive for numbness, paresthesias and sensory change.  Negative for disturbances in coordination, dizziness, focal weakness and tremors.           Objective:      Physical Exam   Cardiovascular:   Dorsalis pedis and posterior tibial pulses are diminished Bilaterally. Toes are cool to touch. Feet are warm proximally.There is decreased digital hair. Skin is atrophic, slightly hyperpigmented, and mildly edematous       Musculoskeletal:   Partial first ray amputation and 5th toe amputation, right. Mild forefoot edema. Ankle dorsiflexion to 5 degrees past neutral with knee extended, right.        Neurological:   Belen-Nenita 5.07 monofilamant testing is diminished both feet. Sharp/dull sensation diminished Bilaterally. Light touch absent Bilaterally.       Skin: Skin is dry and intact.   Plantar wound is healed. No signs of infection.              Assessment:       Encounter Diagnosis   Name Primary?    Post-operative state Yes   Wound is improving, no signs of infection.       Plan:       Indio was seen today for post-op evaluation, cast removal, follow-up and dressing change.    Diagnoses and all orders for this visit:    Post-operative state      I counseled the patient on his conditions, their implications and medical management.    Offload with TCC    WBAT    F/u 1 week.    Will transition into boot then diabetic shoe.         - Ordered application of a new total contact below the knee cast to be applied to the affected foot. With patient's permission, Arcenio Duarte, orthotist,  applied a new total contact cast to the affected foot under my direct supervision. Pt. tolerated well and demonstrated stability with ambulation. Arcenio used 4 rolls of fiberglass and 3 rolls of plaster for the total contact cast.     - Shoe inspection. Diabetic Foot Education. Patient reminded of the importance of good nutrition and blood sugar control to help prevent podiatric complications of diabetes. Patient instructed on proper foot hygeine. We discussed wearing proper shoe gear, daily  foot inspections, never walking without protective shoe gear, never putting sharp instruments to feet,

## 2017-03-20 LAB
ACID FAST MOD KINY STN SPEC: NORMAL
MYCOBACTERIUM SPEC QL CULT: NORMAL

## 2017-03-23 LAB
ACID FAST MOD KINY STN SPEC: NORMAL
MYCOBACTERIUM SPEC QL CULT: NORMAL

## 2017-03-24 ENCOUNTER — OFFICE VISIT (OUTPATIENT)
Dept: PODIATRY | Facility: CLINIC | Age: 47
End: 2017-03-24
Payer: MEDICAID

## 2017-03-24 VITALS — SYSTOLIC BLOOD PRESSURE: 147 MMHG | HEIGHT: 72 IN | HEART RATE: 92 BPM | DIASTOLIC BLOOD PRESSURE: 88 MMHG

## 2017-03-24 DIAGNOSIS — E11.42 DIABETIC POLYNEUROPATHY ASSOCIATED WITH TYPE 2 DIABETES MELLITUS: ICD-10-CM

## 2017-03-24 DIAGNOSIS — Z98.890 POST-OPERATIVE STATE: Primary | ICD-10-CM

## 2017-03-24 DIAGNOSIS — Z89.419 HISTORY OF AMPUTATION OF HALLUX: ICD-10-CM

## 2017-03-24 PROCEDURE — 99213 OFFICE O/P EST LOW 20 MIN: CPT | Mod: PBBFAC | Performed by: PODIATRIST

## 2017-03-24 PROCEDURE — 99024 POSTOP FOLLOW-UP VISIT: CPT | Mod: ,,, | Performed by: PODIATRIST

## 2017-03-24 PROCEDURE — 99999 PR PBB SHADOW E&M-EST. PATIENT-LVL III: CPT | Mod: PBBFAC,,, | Performed by: PODIATRIST

## 2017-03-24 NOTE — PATIENT INSTRUCTIONS
Keep dressing intact for 3 days.    Then OK to clean for with soap and water, cover with clean sock and compression stocking.    Continue minimal walking with boot for 2 weeks.    No standing without boot.     New shoes.

## 2017-03-24 NOTE — PROGRESS NOTES
Subjective:      Patient ID: Indio Ryder Jr. is a 46 y.o. male.    Chief Complaint: Post-op Evaluation (PCP Dr. Thakur 10/29/15); Cast Removal; Dressing Change; and Wound Care    Indio is a 46 y.o. male who presents to the clinic for evaluation and treatment of high risk feet. Indio has a past medical history of Diabetes mellitus type II; Diabetic foot ulcer; Hyperlipidemia; Hypertension; Peripheral neuropathy; Subacute osteomyelitis of right foot (1/12/2017); and Ulcerative colitis, unspecified. He is now 9 weeks s/p I&D, partial first ray amputation and RUSLAN, right.  Denies calf pain or swelling. No new concerns.      PCP: Lorena Thakur MD        Hemoglobin A1C   Date Value Ref Range Status   01/12/2017 15.0 (H) 4.5 - 6.2 % Final     Comment:     According to ADA guidelines, hemoglobin A1C <7.0% represents  optimal control in non-pregnant diabetic patients.  Different  metrics may apply to specific populations.   Standards of Medical Care in Diabetes - 2016.  For the purpose of screening for the presence of diabetes:  <5.7%     Consistent with the absence of diabetes  5.7-6.4%  Consistent with increasing risk for diabetes   (prediabetes)  >or=6.5%  Consistent with diabetes  Currently no consensus exists for use of hemoglobin A1C  for diagnosis of diabetes for children.     05/03/2016 9.0 (H) 4.5 - 6.2 % Final   10/29/2015 7.6 (H) 4.5 - 6.2 % Final     Past Medical History:   Diagnosis Date    Diabetes mellitus type II     Diabetic foot ulcer     Hyperlipidemia     Hypertension     Peripheral neuropathy     Subacute osteomyelitis of right foot 1/12/2017    Streptococcus agalactiae, Actinomyces odontolyticus    Ulcerative colitis, unspecified        Past Surgical History:   Procedure Laterality Date    TOE AMPUTATION Right 06/05/2015    TOE AMPUTATION Right 01/19/2017       Family History   Problem Relation Age of Onset    Adopted: Yes    Hypertension Mother     Cancer Mother      breast     Diabetes Mother     Hypertension Father     Cataracts Father     Diabetes Sister     Hypertension Maternal Aunt        Social History     Social History    Marital status: Single     Spouse name: N/A    Number of children: 0    Years of education: N/A     Occupational History     [employer]     Social History Main Topics    Smoking status: Never Smoker    Smokeless tobacco: None    Alcohol use No    Drug use: No    Sexual activity: No     Other Topics Concern    None     Social History Narrative       Current Outpatient Prescriptions   Medication Sig Dispense Refill    insulin glargine (LANTUS SOLOSTAR) 100 unit/mL (3 mL) InPn pen Inject 26 Units into the skin every evening. 35 Insulin Pen Subcutaneous daily (Patient taking differently: Inject 25 Units into the skin every evening. ) 1 Box 3    metformin (GLUCOPHAGE-XR) 500 MG 24 hr tablet TAKE TWO TABLETS BY MOUTH TWICE A DAY WITH MEALS 120 tablet 3    miconazole (MICOTIN) 2 % cream Apply topically 2 (two) times daily. Apply to left foot after thorough cleaning with soap and water. Need to dry foot completely prior to placing antifungal cream on foot.  0    oxycodone (ROXICODONE) 5 MG immediate release tablet Take 1 tablet (5 mg total) by mouth every 6 (six) hours as needed for Pain. 10 tablet 0     No current facility-administered medications for this visit.        Review of patient's allergies indicates:  No Known Allergies      Review of Systems   Constitution: Negative for chills, fever and malaise/fatigue.   Cardiovascular: Negative for chest pain, leg swelling, orthopnea and palpitations.   Respiratory: Negative for cough, shortness of breath and wheezing.    Skin: Positive for color change, dry skin and nail changes. Negative for itching, poor wound healing and rash.   Musculoskeletal: Negative for arthritis, gout, joint pain, joint swelling, muscle weakness and myalgias.   Neurological: Positive for numbness, paresthesias and sensory  change. Negative for disturbances in coordination, dizziness, focal weakness and tremors.           Objective:      Physical Exam   Cardiovascular:   Dorsalis pedis and posterior tibial pulses are diminished Bilaterally. Toes are cool to touch. Feet are warm proximally.There is decreased digital hair. Skin is atrophic, slightly hyperpigmented, and mildly edematous       Musculoskeletal:   Partial first ray amputation and 5th toe amputation, right. Mild forefoot edema. Ankle dorsiflexion to 5 degrees past neutral with knee extended, right.        Neurological:   [place]-Nenita 5.07 monofilamant testing is diminished both feet. Sharp/dull sensation diminished Bilaterally. Light touch absent Bilaterally.       Skin: Skin is dry and intact.   Plantar wound is healed. No signs of infection.      Lab Results   Component Value Date    SEDRATE 113 (H) 01/16/2017           Assessment:       Encounter Diagnoses   Name Primary?    Post-operative state Yes    History of amputation of hallux     Diabetic polyneuropathy associated with type 2 diabetes mellitus    Wound is healed, no signs of infection.       Plan:       Indio was seen today for post-op evaluation, cast removal, dressing change and wound care.    Diagnoses and all orders for this visit:    Post-operative state  -     DIABETIC SHOES FOR HOME USE    History of amputation of hallux  -     DIABETIC SHOES FOR HOME USE    Diabetic polyneuropathy associated with type 2 diabetes mellitus  -     DIABETIC SHOES FOR HOME USE      I counseled the patient on his conditions, their implications and medical management.    Offload forefoot and immobilize ankle with CAM walker boot    Partial WBAT with crutches    F/u 2 week.    OK to be fit for diabetic shoes.    Plan to transfer from boot to shoes/inserts when available.     - Shoe inspection. Diabetic Foot Education. Patient reminded of the importance of good nutrition and blood sugar control to help prevent podiatric  complications of diabetes. Patient instructed on proper foot hygeine. We discussed wearing proper shoe gear, daily foot inspections, never walking without protective shoe gear, never putting sharp instruments to feet,

## 2017-03-27 LAB
BACTERIA SPEC AEROBE CULT: NORMAL

## 2017-03-29 ENCOUNTER — LAB VISIT (OUTPATIENT)
Dept: LAB | Facility: HOSPITAL | Age: 47
End: 2017-03-29
Attending: INTERNAL MEDICINE
Payer: MEDICAID

## 2017-03-29 ENCOUNTER — OFFICE VISIT (OUTPATIENT)
Dept: INTERNAL MEDICINE | Facility: CLINIC | Age: 47
End: 2017-03-29
Payer: MEDICAID

## 2017-03-29 VITALS
SYSTOLIC BLOOD PRESSURE: 134 MMHG | DIASTOLIC BLOOD PRESSURE: 94 MMHG | BODY MASS INDEX: 28.52 KG/M2 | WEIGHT: 210.56 LBS | OXYGEN SATURATION: 99 % | HEIGHT: 72 IN | HEART RATE: 96 BPM

## 2017-03-29 DIAGNOSIS — E11.40 TYPE 2 DIABETES MELLITUS WITH DIABETIC NEUROPATHY, WITH LONG-TERM CURRENT USE OF INSULIN: Primary | Chronic | ICD-10-CM

## 2017-03-29 DIAGNOSIS — D64.9 ANEMIA, UNSPECIFIED TYPE: ICD-10-CM

## 2017-03-29 DIAGNOSIS — Z79.4 TYPE 2 DIABETES MELLITUS WITH DIABETIC NEUROPATHY, WITH LONG-TERM CURRENT USE OF INSULIN: Chronic | ICD-10-CM

## 2017-03-29 DIAGNOSIS — E11.49 TYPE II OR UNSPECIFIED TYPE DIABETES MELLITUS WITH NEUROLOGICAL MANIFESTATIONS, UNCONTROLLED(250.62): Chronic | ICD-10-CM

## 2017-03-29 DIAGNOSIS — Z79.4 TYPE 2 DIABETES MELLITUS WITH DIABETIC NEUROPATHY, WITH LONG-TERM CURRENT USE OF INSULIN: Primary | Chronic | ICD-10-CM

## 2017-03-29 DIAGNOSIS — E78.5 HYPERLIPIDEMIA, UNSPECIFIED HYPERLIPIDEMIA TYPE: Chronic | ICD-10-CM

## 2017-03-29 DIAGNOSIS — E11.40 TYPE 2 DIABETES MELLITUS WITH DIABETIC NEUROPATHY, WITH LONG-TERM CURRENT USE OF INSULIN: Chronic | ICD-10-CM

## 2017-03-29 DIAGNOSIS — Z91.148 NONCOMPLIANCE WITH MEDICATION REGIMEN: ICD-10-CM

## 2017-03-29 PROBLEM — B37.9 CANDIDA INFECTION: Status: RESOLVED | Noted: 2017-01-23 | Resolved: 2017-03-29

## 2017-03-29 LAB
BACTERIA #/AREA URNS AUTO: NORMAL /HPF
BASOPHILS # BLD AUTO: 0.01 K/UL
BASOPHILS NFR BLD: 0.2 %
BILIRUB UR QL STRIP: NEGATIVE
CLARITY UR REFRACT.AUTO: CLEAR
COLOR UR AUTO: YELLOW
CREAT UR-MCNC: 61 MG/DL
DIFFERENTIAL METHOD: ABNORMAL
EOSINOPHIL # BLD AUTO: 0.1 K/UL
EOSINOPHIL NFR BLD: 1.4 %
ERYTHROCYTE [DISTWIDTH] IN BLOOD BY AUTOMATED COUNT: 14.8 %
FERRITIN SERPL-MCNC: 125 NG/ML
GLUCOSE UR QL STRIP: ABNORMAL
HCT VFR BLD AUTO: 39.1 %
HGB BLD-MCNC: 12.8 G/DL
HGB UR QL STRIP: NEGATIVE
IRON SERPL-MCNC: 84 UG/DL
KETONES UR QL STRIP: NEGATIVE
LEUKOCYTE ESTERASE UR QL STRIP: NEGATIVE
LYMPHOCYTES # BLD AUTO: 2.4 K/UL
LYMPHOCYTES NFR BLD: 49.5 %
MCH RBC QN AUTO: 29.2 PG
MCHC RBC AUTO-ENTMCNC: 32.7 %
MCV RBC AUTO: 89 FL
MICROALBUMIN UR DL<=1MG/L-MCNC: 4 UG/ML
MICROALBUMIN/CREATININE RATIO: 6.6 UG/MG
MICROSCOPIC COMMENT: NORMAL
MONOCYTES # BLD AUTO: 0.4 K/UL
MONOCYTES NFR BLD: 8.2 %
NEUTROPHILS # BLD AUTO: 2 K/UL
NEUTROPHILS NFR BLD: 40.7 %
NITRITE UR QL STRIP: NEGATIVE
PH UR STRIP: 5 [PH] (ref 5–8)
PLATELET # BLD AUTO: 193 K/UL
PMV BLD AUTO: 11 FL
PROT UR QL STRIP: NEGATIVE
RBC # BLD AUTO: 4.39 M/UL
SATURATED IRON: 22 %
SP GR UR STRIP: 1.02 (ref 1–1.03)
SQUAMOUS #/AREA URNS AUTO: 0 /HPF
TOTAL IRON BINDING CAPACITY: 386 UG/DL
TRANSFERRIN SERPL-MCNC: 261 MG/DL
URN SPEC COLLECT METH UR: ABNORMAL
UROBILINOGEN UR STRIP-ACNC: NEGATIVE EU/DL
WBC # BLD AUTO: 4.89 K/UL
WBC #/AREA URNS AUTO: 1 /HPF (ref 0–5)
YEAST UR QL AUTO: NORMAL

## 2017-03-29 PROCEDURE — 99214 OFFICE O/P EST MOD 30 MIN: CPT | Mod: S$PBB,,, | Performed by: INTERNAL MEDICINE

## 2017-03-29 PROCEDURE — 36415 COLL VENOUS BLD VENIPUNCTURE: CPT

## 2017-03-29 PROCEDURE — 99999 PR PBB SHADOW E&M-EST. PATIENT-LVL III: CPT | Mod: PBBFAC,,, | Performed by: INTERNAL MEDICINE

## 2017-03-29 PROCEDURE — 85025 COMPLETE CBC W/AUTO DIFF WBC: CPT

## 2017-03-29 PROCEDURE — 83036 HEMOGLOBIN GLYCOSYLATED A1C: CPT

## 2017-03-29 PROCEDURE — 82728 ASSAY OF FERRITIN: CPT

## 2017-03-29 PROCEDURE — 83540 ASSAY OF IRON: CPT

## 2017-03-29 RX ORDER — METFORMIN HYDROCHLORIDE 500 MG/1
TABLET, EXTENDED RELEASE ORAL
Qty: 120 TABLET | Refills: 3 | Status: SHIPPED | OUTPATIENT
Start: 2017-03-29 | End: 2017-06-09

## 2017-03-29 RX ORDER — ATORVASTATIN CALCIUM 20 MG/1
20 TABLET, FILM COATED ORAL DAILY
Qty: 90 TABLET | Refills: 3 | Status: SHIPPED | OUTPATIENT
Start: 2017-03-29 | End: 2017-06-09 | Stop reason: SDUPTHER

## 2017-03-29 RX ORDER — LISINOPRIL AND HYDROCHLOROTHIAZIDE 10; 12.5 MG/1; MG/1
1 TABLET ORAL DAILY
Qty: 90 TABLET | Refills: 3 | Status: ON HOLD | OUTPATIENT
Start: 2017-03-29 | End: 2017-06-02 | Stop reason: HOSPADM

## 2017-03-29 RX ORDER — LANCETS
1 EACH MISCELLANEOUS DAILY
Qty: 300 EACH | Refills: 3 | Status: SHIPPED | OUTPATIENT
Start: 2017-03-29 | End: 2019-07-12

## 2017-03-29 RX ORDER — INSULIN GLARGINE 100 [IU]/ML
26 INJECTION, SOLUTION SUBCUTANEOUS NIGHTLY
Qty: 3 BOX | Refills: 3 | Status: SHIPPED | OUTPATIENT
Start: 2017-03-29 | End: 2017-06-09 | Stop reason: SDUPTHER

## 2017-03-29 NOTE — PROGRESS NOTES
Subjective:       Patient ID: Indio Ryder Jr. is a 46 y.o. male.    Chief Complaint: Follow-up (Type II diabetes mellitus with neurological manifestations, uncontrolled)    HPI   S/p amputation R gt toe due to osteomyelitis jan 2017.  Seeing podiatry for recurrent foot ulcer.  Wearing a boot.    Terribly controlled DM.  aic 15 in jan.  He lost insurance for several months.    Taking Lantus 25 units and metformin 500 mg twice a day.  He was out of insulin x months!    BP elevated today, but he tells me that BP was controlled in hospital.  He took hctz in past for edema  C/o edema during day, alleviated with sleep.  No CP. But SOB with walking.  He is wearing a heavy boot and has been inactive..    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      Single, but has family support.        Review of Systems   Constitutional: Negative for activity change and unexpected weight change.   HENT: Negative for hearing loss, rhinorrhea and trouble swallowing.    Eyes: Negative for discharge and visual disturbance.   Respiratory: Negative for chest tightness and wheezing.    Cardiovascular: Negative for chest pain and palpitations.   Gastrointestinal: Negative for blood in stool, constipation, diarrhea and vomiting.   Endocrine: Negative for polydipsia and polyuria.   Genitourinary: Negative for difficulty urinating, hematuria and urgency.   Musculoskeletal: Negative for arthralgias and joint swelling.   Neurological: Negative for weakness and headaches.   Psychiatric/Behavioral: Negative for confusion and dysphoric mood.       Objective:      Physical Exam   Constitutional: He is oriented to person, place, and time. He appears well-developed and well-nourished.   Eyes: No scleral icterus.   Neck: No JVD present. No thyromegaly present.   Cardiovascular: Normal rate, regular rhythm and normal heart sounds.    Pulmonary/Chest: Effort normal and breath sounds normal. No respiratory distress. He has no  wheezes. He has no rales.   Abdominal: Soft. He exhibits no mass. There is no tenderness.   Musculoskeletal: He exhibits no edema.   Wearing boot on RLE.   Neurological: He is alert and oriented to person, place, and time.   Psychiatric: He has a normal mood and affect. His behavior is normal.       146/94  Assessment:       1. Type 2 diabetes mellitus with diabetic neuropathy, with long-term current use of insulin    2. Noncompliance with medication regimen    3. Hyperlipidemia, unspecified hyperlipidemia type    4. Anemia, unspecified type    5. Type II or unspecified type diabetes mellitus with neurological manifestations, uncontrolled(250.62)       He has been without insurance and insulin for some time.  Plan:       Indio was seen today for follow-up.    Diagnoses and all orders for this visit:    Type 2 diabetes mellitus with diabetic neuropathy, with long-term current use of insulin  -     Comprehensive metabolic panel; Future  -     CBC auto differential; Future  -     Hemoglobin A1c; Future  -     Hemoglobin A1c; Future  -     Urinalysis  -     Microalbumin/creatinine urine ratio    Noncompliance with medication regimen    Hyperlipidemia, unspecified hyperlipidemia type  -     Lipid panel; Future  -     Cancel: AST (SGOT); Future  -     Cancel: ALT (SGPT); Future    Anemia, unspecified type  -     Ferritin; Future  -     Iron and TIBC; Future    Type II or unspecified type diabetes mellitus with neurological manifestations, uncontrolled(250.62)  -     insulin glargine (LANTUS SOLOSTAR) 100 unit/mL (3 mL) InPn pen; Inject 26 Units into the skin every evening. 35 Insulin Pen Subcutaneous daily    Other orders  -     lisinopril-hydrochlorothiazide (PRINZIDE,ZESTORETIC) 10-12.5 mg per tablet; Take 1 tablet by mouth once daily.  -     metformin (GLUCOPHAGE-XR) 500 MG 24 hr tablet; TAKE TWO TABLETS BY MOUTH TWICE A DAY WITH MEALS  -     atorvastatin (LIPITOR) 20 MG tablet; Take 1 tablet (20 mg total) by mouth  once daily.  -     blood sugar diagnostic Strp; 1 strip by Misc.(Non-Drug; Combo Route) route once daily.  -     lancets (ACCU-CHEK SOFTCLIX LANCETS) Misc; 1 Device by Misc.(Non-Drug; Combo Route) route once daily.         Cbc, iron, aic now.  Lipids, cmp 3 mo

## 2017-03-29 NOTE — MR AVS SNAPSHOT
[facility]  [address]  [facility] [address]  Phone: [phone]  Fax: [phone]                  Indio Ryder Jr.   3/29/2017 8:10 AM   Office Visit    Description:  Male : 1970   Provider:  Lorena Thakur MD   Department:  [facility] - Internal Medicine           Reason for Visit     Follow-up           Diagnoses this Visit        Comments    Type 2 diabetes mellitus with diabetic neuropathy, with long-term current use of insulin    -  Primary     Noncompliance with medication regimen         Hyperlipidemia, unspecified hyperlipidemia type         Anemia, unspecified type         Type II or unspecified type diabetes mellitus with neurological manifestations, uncontrolled                To Do List           Future Appointments        Provider Department Dept Phone    2017 11:30 AM Ramirez Wilkes DPM [facility] [phone]    2017 10:00 AM LAB, APPOINTMENT [facility] [phone]    2017 9:40 AM Lorena Thakur MD [facility] [phone]      Goals (5 Years of Data)     None      Follow-Up and Disposition     Return in about 3 months (around 2017).       These Medications        Disp Refills Start End    lisinopril-hydrochlorothiazide (PRINZIDE,ZESTORETIC) 10-12.5 mg per tablet 90 tablet 3 3/29/2017 3/29/2018    Take 1 tablet by mouth once daily. - Oral    Pharmacy: [facility] [place], [place] - [address] [facility] Ph #: [phone]       insulin glargine (LANTUS SOLOSTAR) 100 unit/mL (3 mL) InPn pen 3 Box 3 3/29/2017     Inject 26 Units into the skin every evening. 35 Insulin Pen Subcutaneous daily - Subcutaneous    Pharm[facility]: [facility] [place] [address] [facility] Ph #: [phone]       metformin (GLUCOPHAGE-XR) 500 MG 24 hr tablet 120 tablet 3 3/29/2017     TAKE TWO TABLETS BY MOUTH TWICE A DAY WITH MEALS     Pharmacy: [facility] [place] [address] [place] [address] Ph #: [phone]       atorvastatin (LIPITOR) 20 MG tablet 90 tablet 3 3/29/2017 3/29/2018    Take 1 tablet (20 mg total) by mouth once daily. - Oral    [place] [facility] [place] [address] [place] [address] Ph #: [phone]       blood sugar diagnostic Strp 300 strip 3 3/29/2017     1 strip by Misc.(Non-Drug; Combo Route) route once daily. - Misc.(Non-Drug; Combo Route)    Pharmacy: [facility] [place]  [address] [place] [address] Ph #: [phone]       lancets (ACCU-CHEK SOFTCLIX LANCETS) Misc 300 each 3 3/29/2017     1 Device by Misc.(Non-Drug; Combo Route) route once daily. - Misc.(Non-Drug; Combo Route)    Pharmacy: [facility] [place] [address] [facility] Ph #: [phone]         [facility]     [facility] Care Line -  Assistance  Registered nurses in the [facility] provide clinical advisement, health education, appointment booking, and other advisory services.  Call for this free service at [phone].             Medications           Message regarding Medications     Verify the changes and/or additions to your medication regime listed below are the same as discussed with your clinician today.  If any of these changes or additions are incorrect, please notify your healthcare provider.        START taking these NEW medications        Refills    lisinopril-hydrochlorothiazide (PRINZIDE,ZESTORETIC) 10-12.5 mg per tablet 3    Sig: Take 1 tablet by mouth once daily.    Class: Normal    Route: Oral    atorvastatin (LIPITOR) 20 MG tablet 3    Sig: Take 1 tablet (20 mg total) by mouth once daily.    Class: Normal    Route: Oral    blood sugar diagnostic Strp 3    Si strip by Misc.(Non-Drug; Combo Route) route once daily.    Class: Normal    Route: Misc.(Non-Drug; Combo Route)     lancets (ACCU-CHEK SOFTCLIX LANCETS) Misc 3    Si Device by Misc.(Non-Drug; Combo Route) route once daily.    Class: Normal    Route: Misc.(Non-Drug; Combo Route)           Verify that the below list of medications is an accurate representation of the medications you are currently taking.  If none reported, the list may be blank. If incorrect, please contact your healthcare provider. Carry this list with you in case of emergency.           Current Medications     insulin glargine (LANTUS SOLOSTAR) 100 unit/mL (3 mL) InPn pen Inject 26 Units into the skin every evening. 35 Insulin Pen Subcutaneous daily    metformin (GLUCOPHAGE-XR) 500 MG 24 hr tablet TAKE TWO TABLETS BY MOUTH TWICE A DAY WITH MEALS    miconazole (MICOTIN) 2 % cream Apply topically 2 (two) times daily. Apply to left foot after thorough cleaning with soap and water. Need to dry foot completely prior to placing antifungal cream on foot.    atorvastatin (LIPITOR) 20 MG tablet Take 1 tablet (20 mg total) by mouth once daily.    blood sugar diagnostic Strp 1 strip by Misc.(Non-Drug; Combo Route) route once daily.    lancets (ACCU-CHEK SOFTCLIX LANCETS) Misc 1 Device by Misc.(Non-Drug; Combo Route) route once daily.    lisinopril-hydrochlorothiazide (PRINZIDE,ZESTORETIC) 10-12.5 mg per tablet Take 1 tablet by mouth once daily.    oxycodone (ROXICODONE) 5 MG immediate release tablet Take 1 tablet (5 mg total) by mouth every 6 (six) hours as needed for Pain.           Clinical Reference Information           Your Vitals Were     BP Pulse Height Weight SpO2 BMI    134/94 (BP Location: Right arm, Patient Position: Sitting, BP Method: Manual) 96 6' (1.829 m) 95.5 kg (210 lb 8.6 oz) 99% 28.55 kg/m2      Blood Pressure          Most Recent Value    BP  (!)  134/94      Allergies as of 3/29/2017     No Known Allergies      Immunizations Administered on Date of Encounter - 3/29/2017     None      Orders Placed During Today's Visit      Normal Orders This Visit     Microalbumin/creatinine urine ratio     Urinalysis     Future Labs/Procedures Expected by Expires    CBC auto differential  3/29/2017 3/29/2018    Comprehensive metabolic panel  3/29/2017 3/29/2018    Ferritin  3/29/2017 3/24/2018    Hemoglobin A1c  3/29/2017 3/29/2018    Iron and TIBC  3/29/2017 3/24/2018    Lipid panel  3/29/2017 3/29/2018    Hemoglobin A1c  6/27/2017 9/25/2017      Instructions    Restart Atorvastatin 20 mg daily for cholesterol    Adjust Lantus to bring fasting glucose 120 or less.    Begin lisinopril/hctz for high blood pressure and swelling.       Language Assistance Services     ATTENTION: Language assistance services are available, free of charge. Please call [phone].      ATENCIÓN: Si luisanala arleth, tiene a remy disposición servicios gratuitos de asistencia lingüística. Llame al [phone].     CHÚ Ý: N?u b?n nói Ti?ng Vi?t, có các d?ch v? h? tr? ngôn ng? mi?n phí dành cho b?n. G?i s? [phone].         [facility] complies with applicable Federal civil rights laws and does not discriminate on the basis of race, color, national origin, age, disability, or sex.

## 2017-03-29 NOTE — PATIENT INSTRUCTIONS
Restart Atorvastatin 20 mg daily for cholesterol    Adjust Lantus to bring fasting glucose 120 or less.    Begin lisinopril/hctz for high blood pressure and swelling.

## 2017-03-30 LAB
ESTIMATED AVG GLUCOSE: 289 MG/DL
HBA1C MFR BLD HPLC: 11.7 %

## 2017-04-07 ENCOUNTER — OFFICE VISIT (OUTPATIENT)
Dept: PODIATRY | Facility: CLINIC | Age: 47
End: 2017-04-07
Payer: MEDICAID

## 2017-04-07 VITALS
WEIGHT: 212.5 LBS | HEART RATE: 91 BPM | HEIGHT: 72 IN | BODY MASS INDEX: 28.78 KG/M2 | SYSTOLIC BLOOD PRESSURE: 131 MMHG | DIASTOLIC BLOOD PRESSURE: 89 MMHG

## 2017-04-07 DIAGNOSIS — Z89.419 HISTORY OF AMPUTATION OF HALLUX: Primary | ICD-10-CM

## 2017-04-07 DIAGNOSIS — E11.42 DIABETIC POLYNEUROPATHY ASSOCIATED WITH TYPE 2 DIABETES MELLITUS: ICD-10-CM

## 2017-04-07 PROCEDURE — 99213 OFFICE O/P EST LOW 20 MIN: CPT | Mod: PBBFAC | Performed by: PODIATRIST

## 2017-04-07 PROCEDURE — 99999 PR PBB SHADOW E&M-EST. PATIENT-LVL III: CPT | Mod: PBBFAC,,, | Performed by: PODIATRIST

## 2017-04-07 PROCEDURE — 99024 POSTOP FOLLOW-UP VISIT: CPT | Mod: ,,, | Performed by: PODIATRIST

## 2017-04-07 NOTE — PROGRESS NOTES
Subjective:      Patient ID: Indio Ryder Jr. is a 46 y.o. male.    Chief Complaint: Post-op Evaluation (PCP Dr. Thakur 3/29/17)    Indio is a 46 y.o. male who presents to the clinic for evaluation and treatment of high risk feet. Indio has a past medical history of Diabetes mellitus type II; Diabetic foot ulcer; Hyperlipidemia; Hypertension; Peripheral neuropathy; Subacute osteomyelitis of right foot (1/12/2017); and Ulcerative colitis, unspecified. He is now 11 weeks s/p I&D, partial first ray amputation and RUSLAN, right.  Denies calf pain or swelling. No new concerns. He was fit for diabetic shoes. Hoping to return to work soon.      PCP: Lorena Thakur MD        Hemoglobin A1C   Date Value Ref Range Status   03/29/2017 11.7 (H) 4.5 - 6.2 % Final     Comment:     According to ADA guidelines, hemoglobin A1C <7.0% represents  optimal control in non-pregnant diabetic patients.  Different  metrics may apply to specific populations.   Standards of Medical Care in Diabetes - 2016.  For the purpose of screening for the presence of diabetes:  <5.7%     Consistent with the absence of diabetes  5.7-6.4%  Consistent with increasing risk for diabetes   (prediabetes)  >or=6.5%  Consistent with diabetes  Currently no consensus exists for use of hemoglobin A1C  for diagnosis of diabetes for children.     01/12/2017 15.0 (H) 4.5 - 6.2 % Final     Comment:     According to ADA guidelines, hemoglobin A1C <7.0% represents  optimal control in non-pregnant diabetic patients.  Different  metrics may apply to specific populations.   Standards of Medical Care in Diabetes - 2016.  For the purpose of screening for the presence of diabetes:  <5.7%     Consistent with the absence of diabetes  5.7-6.4%  Consistent with increasing risk for diabetes   (prediabetes)  >or=6.5%  Consistent with diabetes  Currently no consensus exists for use of hemoglobin A1C  for diagnosis of diabetes for children.     05/03/2016 9.0 (H) 4.5 - 6.2 %  Final     Past Medical History:   Diagnosis Date    Diabetes mellitus type II     Diabetic foot ulcer     Hyperlipidemia     Hypertension     Peripheral neuropathy     Subacute osteomyelitis of right foot 1/12/2017    Streptococcus agalactiae, Actinomyces odontolyticus    Ulcerative colitis, unspecified        Past Surgical History:   Procedure Laterality Date    TOE AMPUTATION Right 06/05/2015    TOE AMPUTATION Right 01/19/2017       Family History   Problem Relation Age of Onset    Adopted: Yes    Hypertension Mother     Cancer Mother      breast    Diabetes Mother     Hypertension Father     Cataracts Father     Diabetes Sister     Hypertension Maternal Aunt        Social History     Social History    Marital status: Single     Spouse name: N/A    Number of children: 0    Years of education: N/A     Occupational History     [employer]     Social History Main Topics    Smoking status: Never Smoker    Smokeless tobacco: None    Alcohol use No    Drug use: No    Sexual activity: No     Other Topics Concern    None     Social History Narrative       Current Outpatient Prescriptions   Medication Sig Dispense Refill    atorvastatin (LIPITOR) 20 MG tablet Take 1 tablet (20 mg total) by mouth once daily. 90 tablet 3    blood sugar diagnostic Strp 1 strip by Misc.(Non-Drug; Combo Route) route once daily. 300 strip 3    insulin glargine (LANTUS SOLOSTAR) 100 unit/mL (3 mL) InPn pen Inject 26 Units into the skin every evening. 35 Insulin Pen Subcutaneous daily 3 Box 3    lancets (ACCU-CHEK SOFTCLIX LANCETS) Misc 1 Device by Misc.(Non-Drug; Combo Route) route once daily. 300 each 3    lisinopril-hydrochlorothiazide (PRINZIDE,ZESTORETIC) 10-12.5 mg per tablet Take 1 tablet by mouth once daily. 90 tablet 3    metformin (GLUCOPHAGE-XR) 500 MG 24 hr tablet TAKE TWO TABLETS BY MOUTH TWICE A DAY WITH MEALS 120 tablet 3    miconazole (MICOTIN) 2 % cream Apply topically 2 (two) times daily.  Apply to left foot after thorough cleaning with soap and water. Need to dry foot completely prior to placing antifungal cream on foot.  0    oxycodone (ROXICODONE) 5 MG immediate release tablet Take 1 tablet (5 mg total) by mouth every 6 (six) hours as needed for Pain. 10 tablet 0     No current facility-administered medications for this visit.        Review of patient's allergies indicates:  No Known Allergies      Review of Systems   Constitution: Negative for chills, fever and malaise/fatigue.   Cardiovascular: Negative for chest pain, leg swelling, orthopnea and palpitations.   Respiratory: Negative for cough, shortness of breath and wheezing.    Skin: Positive for color change, dry skin and nail changes. Negative for itching, poor wound healing and rash.   Musculoskeletal: Negative for arthritis, gout, joint pain, joint swelling, muscle weakness and myalgias.   Neurological: Positive for numbness, paresthesias and sensory change. Negative for disturbances in coordination, dizziness, focal weakness and tremors.           Objective:      Physical Exam   Cardiovascular:   Dorsalis pedis and posterior tibial pulses are diminished Bilaterally. Toes are cool to touch. Feet are warm proximally.There is decreased digital hair. Skin is atrophic, slightly hyperpigmented, and mildly edematous       Musculoskeletal:   Partial first ray amputation and 5th toe amputation, right. Mild forefoot edema. Ankle dorsiflexion to 5 degrees past neutral with knee extended, right.        Neurological:   [place]enita 5.07 monofilamant testing is diminished both feet. Sharp/dull sensation diminished Bilaterally. Light touch absent Bilaterally.       Skin: Skin is dry and intact.   Plantar wound is healed. No signs of infection.          Assessment:       Encounter Diagnoses   Name Primary?    History of amputation of hallux Yes    Diabetic polyneuropathy associated with type 2 diabetes mellitus    Wound is healed, no signs of  infection.       Plan:       Indio was seen today for post-op evaluation.    Diagnoses and all orders for this visit:    History of amputation of hallux    Diabetic polyneuropathy associated with type 2 diabetes mellitus    I counseled the patient on his conditions, their implications and medical management.    Offload forefoot and immobilize ankle with CAM walker boot    WBAT    OK to transition to diabetic shoes when available. F/u 1 week after use of new shoes.    - Shoe inspection. Diabetic Foot Education. Patient reminded of the importance of good nutrition and blood sugar control to help prevent podiatric complications of diabetes. Patient instructed on proper foot hygeine. We discussed wearing proper shoe gear, daily foot inspections, never walking without protective shoe gear, never putting sharp instruments to feet,

## 2017-04-07 NOTE — PATIENT INSTRUCTIONS
Start seated heel raises (5 sets of 10 per day)    Slowly transition into shoes when available. No barefoot walking.     See me after using shoes for 1 week. Will consider therapy as needed.         Ankle Dorsiflexion/Plantarflexion (Flexibility)    1. Sit on the floor or in bed with your legs straight in front of you.  2. Point both feet. Then flex both feet.  3. Do this 10 to 30 times in a row.  4. Repeat this exercise 2 times a day, or as instructed.  Date Last Reviewed: 5/1/2016  © 8316-8570 [employer]. [address] [place] [address]. All rights reserved. This information is not intended as a substitute for professional medical care. Always follow your healthcare professional's instructions.        Plantarflexion (Strength)    These instructions are for your right ankle. Switch sides for your left ankle.  5. Sit on a bed or the floor with your right leg out straight.  6. Loop an elastic exercise band or tubing around your right foot. Hold the ends in your hands.  7. Push on the elastic band with the ball of your foot, pointing your toes. Pull the elastic band toward as you do this. Hold for 5 seconds. Then move your foot back to the starting position.  8. Repeat 10 times, or as instructed.  9. Do this exercise 3 times a day, or as instructed.  Date Last Reviewed: 3/10/2016  © 1861-6010 [employer]. [address] [place] [address]. All rights reserved. This information is not intended as a substitute for professional medical care. Always follow your healthcare professional's instructions.

## 2017-04-21 ENCOUNTER — TELEPHONE (OUTPATIENT)
Dept: INTERNAL MEDICINE | Facility: CLINIC | Age: 47
End: 2017-04-21

## 2017-04-21 NOTE — TELEPHONE ENCOUNTER
----- Message from Chela Dean MA sent at 4/21/2017  8:41 AM CDT -----  Contact: self - [phone]   Patient is requesting to speak with someone today in regard to the Orders for his diabetic shoes. Stated he cant go back to work until he can get them. Please call. Thanks!

## 2017-04-28 ENCOUNTER — OFFICE VISIT (OUTPATIENT)
Dept: PODIATRY | Facility: CLINIC | Age: 47
End: 2017-04-28
Payer: MEDICAID

## 2017-04-28 VITALS
SYSTOLIC BLOOD PRESSURE: 137 MMHG | DIASTOLIC BLOOD PRESSURE: 93 MMHG | BODY MASS INDEX: 29.46 KG/M2 | WEIGHT: 217.5 LBS | HEART RATE: 88 BPM | HEIGHT: 72 IN

## 2017-04-28 DIAGNOSIS — E11.42 DIABETIC POLYNEUROPATHY ASSOCIATED WITH TYPE 2 DIABETES MELLITUS: ICD-10-CM

## 2017-04-28 DIAGNOSIS — Z89.419 HISTORY OF AMPUTATION OF HALLUX: Primary | ICD-10-CM

## 2017-04-28 PROCEDURE — 99999 PR PBB SHADOW E&M-EST. PATIENT-LVL III: CPT | Mod: PBBFAC,,, | Performed by: PODIATRIST

## 2017-04-28 PROCEDURE — 99212 OFFICE O/P EST SF 10 MIN: CPT | Mod: S$PBB,,, | Performed by: PODIATRIST

## 2017-04-28 PROCEDURE — 99213 OFFICE O/P EST LOW 20 MIN: CPT | Mod: PBBFAC | Performed by: PODIATRIST

## 2017-04-28 NOTE — PATIENT INSTRUCTIONS
Plantarflexion (Strength)    These instructions are for your right ankle. Switch sides for your left ankle.  1. Sit on a bed or the floor with your right leg out straight.  2. Loop an elastic exercise band or tubing around your right foot. Hold the ends in your hands.  3. Push on the elastic band with the ball of your foot, pointing your toes. Pull the elastic band toward as you do this. Hold for 5 seconds. Then move your foot back to the starting position.  4. Repeat 10 times, or as instructed.  5. Do this exercise 3 times a day, or as instructed.  Date Last Reviewed: 3/10/2016  © 9081-4127 [employer]. [address] [place] [address], [place] [address]. All rights reserved. This information is not intended as a substitute for professional medical care. Always follow your healthcare professional's instructions.

## 2017-04-29 NOTE — PROGRESS NOTES
Subjective:      Patient ID: Indio Ryder Jr. is a 46 y.o. male.    Chief Complaint: Post-op Evaluation (right ft. amputation/PCP Dr. Thakur) and Follow-up    Indio is a 46 y.o. male who presents to the clinic for evaluation and treatment of high risk feet. Indio has a past medical history of Diabetes mellitus type II; Diabetic foot ulcer; Hyperlipidemia; Hypertension; Peripheral neuropathy; Subacute osteomyelitis of right foot (1/12/2017); and Ulcerative colitis, unspecified. He is now 14 weeks s/p I&D, partial first ray amputation and RUSLAN, right.  Denies calf pain or swelling. No new concerns. He was fit for diabetic shoes, awaiting arrival. Hoping to return to work soon.      PCP: Lorena Thakur MD        Hemoglobin A1C   Date Value Ref Range Status   03/29/2017 11.7 (H) 4.5 - 6.2 % Final     Comment:     According to ADA guidelines, hemoglobin A1C <7.0% represents  optimal control in non-pregnant diabetic patients.  Different  metrics may apply to specific populations.   Standards of Medical Care in Diabetes - 2016.  For the purpose of screening for the presence of diabetes:  <5.7%     Consistent with the absence of diabetes  5.7-6.4%  Consistent with increasing risk for diabetes   (prediabetes)  >or=6.5%  Consistent with diabetes  Currently no consensus exists for use of hemoglobin A1C  for diagnosis of diabetes for children.     01/12/2017 15.0 (H) 4.5 - 6.2 % Final     Comment:     According to ADA guidelines, hemoglobin A1C <7.0% represents  optimal control in non-pregnant diabetic patients.  Different  metrics may apply to specific populations.   Standards of Medical Care in Diabetes - 2016.  For the purpose of screening for the presence of diabetes:  <5.7%     Consistent with the absence of diabetes  5.7-6.4%  Consistent with increasing risk for diabetes   (prediabetes)  >or=6.5%  Consistent with diabetes  Currently no consensus exists for use of hemoglobin A1C  for diagnosis of diabetes for  children.     05/03/2016 9.0 (H) 4.5 - 6.2 % Final     Past Medical History:   Diagnosis Date    Diabetes mellitus type II     Diabetic foot ulcer     Hyperlipidemia     Hypertension     Peripheral neuropathy     Subacute osteomyelitis of right foot 1/12/2017    Streptococcus agalactiae, Actinomyces odontolyticus    Ulcerative colitis, unspecified        Past Surgical History:   Procedure Laterality Date    TOE AMPUTATION Right 06/05/2015    TOE AMPUTATION Right 01/19/2017       Family History   Problem Relation Age of Onset    Adopted: Yes    Hypertension Mother     Cancer Mother      breast    Diabetes Mother     Hypertension Father     Cataracts Father     Diabetes Sister     Hypertension Maternal Aunt        Social History     Social History    Marital status: Single     Spouse name: N/A    Number of children: 0    Years of education: N/A     Occupational History     [employer]     Social History Main Topics    Smoking status: Never Smoker    Smokeless tobacco: None    Alcohol use No    Drug use: No    Sexual activity: No     Other Topics Concern    None     Social History Narrative       Current Outpatient Prescriptions   Medication Sig Dispense Refill    atorvastatin (LIPITOR) 20 MG tablet Take 1 tablet (20 mg total) by mouth once daily. 90 tablet 3    blood sugar diagnostic Strp 1 strip by Misc.(Non-Drug; Combo Route) route once daily. 300 strip 3    insulin glargine (LANTUS SOLOSTAR) 100 unit/mL (3 mL) InPn pen Inject 26 Units into the skin every evening. 35 Insulin Pen Subcutaneous daily 3 Box 3    lancets (ACCU-CHEK SOFTCLIX LANCETS) Misc 1 Device by Misc.(Non-Drug; Combo Route) route once daily. 300 each 3    lisinopril-hydrochlorothiazide (PRINZIDE,ZESTORETIC) 10-12.5 mg per tablet Take 1 tablet by mouth once daily. 90 tablet 3    metformin (GLUCOPHAGE-XR) 500 MG 24 hr tablet TAKE TWO TABLETS BY MOUTH TWICE A DAY WITH MEALS 120 tablet 3    miconazole (MICOTIN) 2 %  cream Apply topically 2 (two) times daily. Apply to left foot after thorough cleaning with soap and water. Need to dry foot completely prior to placing antifungal cream on foot.  0    oxycodone (ROXICODONE) 5 MG immediate release tablet Take 1 tablet (5 mg total) by mouth every 6 (six) hours as needed for Pain. 10 tablet 0     No current facility-administered medications for this visit.        Review of patient's allergies indicates:  No Known Allergies      Review of Systems   Constitution: Negative for chills, fever and malaise/fatigue.   Cardiovascular: Negative for chest pain, leg swelling, orthopnea and palpitations.   Respiratory: Negative for cough, shortness of breath and wheezing.    Skin: Positive for color change, dry skin and nail changes. Negative for itching, poor wound healing and rash.   Musculoskeletal: Negative for arthritis, gout, joint pain, joint swelling, muscle weakness and myalgias.   Neurological: Positive for numbness, paresthesias and sensory change. Negative for disturbances in coordination, dizziness, focal weakness and tremors.           Objective:      Physical Exam   Cardiovascular:   Dorsalis pedis and posterior tibial pulses are diminished Bilaterally. Toes are cool to touch. Feet are warm proximally.There is decreased digital hair. Skin is atrophic, slightly hyperpigmented, and mildly edematous       Musculoskeletal:   Partial first ray amputation and 5th toe amputation, right. Mild forefoot edema. Ankle dorsiflexion to 5 degrees past neutral with knee extended, right.   5/5 MS at right achilles.        Neurological:   [place]-Nenita 5.07 monofilamant testing is diminished both feet. Sharp/dull sensation diminished Bilaterally. Light touch absent Bilaterally.       Skin: Skin is dry and intact.   Plantar wound is healed. No signs of infection.          Assessment:       Encounter Diagnoses   Name Primary?    History of amputation of hallux Yes    Diabetic polyneuropathy  associated with type 2 diabetes mellitus    Wound is healed, no signs of infection.       Plan:       Indio was seen today for post-op evaluation and follow-up.    Diagnoses and all orders for this visit:    History of amputation of hallux    Diabetic polyneuropathy associated with type 2 diabetes mellitus      I counseled the patient on his conditions, their implications and medical management.    Offload forefoot and immobilize ankle with CAM walker boot    WBAT    OK to transition to diabetic shoes when available.    F/u after wearing new shoes for a week for evaluation     - Shoe inspection. Diabetic Foot Education. Patient reminded of the importance of good nutrition and blood sugar control to help prevent podiatric complications of diabetes. Patient instructed on proper foot hygeine. We discussed wearing proper shoe gear, daily foot inspections, never walking without protective shoe gear, never putting sharp instruments to feet,

## 2017-05-10 ENCOUNTER — TELEPHONE (OUTPATIENT)
Dept: PODIATRY | Facility: CLINIC | Age: 47
End: 2017-05-10

## 2017-05-10 NOTE — TELEPHONE ENCOUNTER
Received phone call from pt  Would like a letter  And clearence to return back to work Monday  Made appt with dr. Wilkes at 830am  Friday may 12, 2017

## 2017-05-12 ENCOUNTER — OFFICE VISIT (OUTPATIENT)
Dept: PODIATRY | Facility: CLINIC | Age: 47
End: 2017-05-12
Payer: MEDICAID

## 2017-05-12 VITALS
HEIGHT: 72 IN | SYSTOLIC BLOOD PRESSURE: 156 MMHG | HEART RATE: 87 BPM | BODY MASS INDEX: 28.78 KG/M2 | DIASTOLIC BLOOD PRESSURE: 100 MMHG | WEIGHT: 212.5 LBS

## 2017-05-12 DIAGNOSIS — R29.898 WEAKNESS OF RIGHT LEG: ICD-10-CM

## 2017-05-12 DIAGNOSIS — E11.42 DIABETIC POLYNEUROPATHY ASSOCIATED WITH TYPE 2 DIABETES MELLITUS: Primary | ICD-10-CM

## 2017-05-12 DIAGNOSIS — Z89.419 HISTORY OF AMPUTATION OF HALLUX: ICD-10-CM

## 2017-05-12 PROCEDURE — 99999 PR PBB SHADOW E&M-EST. PATIENT-LVL III: CPT | Mod: PBBFAC,,, | Performed by: PODIATRIST

## 2017-05-12 PROCEDURE — 99213 OFFICE O/P EST LOW 20 MIN: CPT | Mod: S$PBB,,, | Performed by: PODIATRIST

## 2017-05-12 PROCEDURE — 99213 OFFICE O/P EST LOW 20 MIN: CPT | Mod: PBBFAC | Performed by: PODIATRIST

## 2017-05-12 NOTE — PROGRESS NOTES
Subjective:      Patient ID: Indio Ryder Jr. is a 46 y.o. male.    Chief Complaint: Post-op Evaluation (PCP Dr. Thakur 3/29/17) and Follow-up    Indio is a 46 y.o. male who presents to the clinic for evaluation and treatment of high risk feet. Indio has a past medical history of Diabetes mellitus type II; Diabetic foot ulcer; Hyperlipidemia; Hypertension; Peripheral neuropathy; Subacute osteomyelitis of right foot (1/12/2017); and Ulcerative colitis, unspecified. He is now 4 months s/p I&D, partial first ray amputation and RUSLAN, right.  Denies calf pain or swelling. No new concerns. He started wearing new DM shoes/inserts two days ago with no complaints. Requests return to work.      PCP: Lorena Thakur MD        Hemoglobin A1C   Date Value Ref Range Status   03/29/2017 11.7 (H) 4.5 - 6.2 % Final     Comment:     According to ADA guidelines, hemoglobin A1C <7.0% represents  optimal control in non-pregnant diabetic patients.  Different  metrics may apply to specific populations.   Standards of Medical Care in Diabetes - 2016.  For the purpose of screening for the presence of diabetes:  <5.7%     Consistent with the absence of diabetes  5.7-6.4%  Consistent with increasing risk for diabetes   (prediabetes)  >or=6.5%  Consistent with diabetes  Currently no consensus exists for use of hemoglobin A1C  for diagnosis of diabetes for children.     01/12/2017 15.0 (H) 4.5 - 6.2 % Final     Comment:     According to ADA guidelines, hemoglobin A1C <7.0% represents  optimal control in non-pregnant diabetic patients.  Different  metrics may apply to specific populations.   Standards of Medical Care in Diabetes - 2016.  For the purpose of screening for the presence of diabetes:  <5.7%     Consistent with the absence of diabetes  5.7-6.4%  Consistent with increasing risk for diabetes   (prediabetes)  >or=6.5%  Consistent with diabetes  Currently no consensus exists for use of hemoglobin A1C  for diagnosis of diabetes  for children.     05/03/2016 9.0 (H) 4.5 - 6.2 % Final     Past Medical History:   Diagnosis Date    Diabetes mellitus type II     Diabetic foot ulcer     Hyperlipidemia     Hypertension     Peripheral neuropathy     Subacute osteomyelitis of right foot 1/12/2017    Streptococcus agalactiae, Actinomyces odontolyticus    Ulcerative colitis, unspecified        Past Surgical History:   Procedure Laterality Date    TOE AMPUTATION Right 06/05/2015    TOE AMPUTATION Right 01/19/2017       Family History   Problem Relation Age of Onset    Adopted: Yes    Hypertension Mother     Cancer Mother      breast    Diabetes Mother     Hypertension Father     Cataracts Father     Diabetes Sister     Hypertension Maternal Aunt        Social History     Social History    Marital status: Single     Spouse name: N/A    Number of children: 0    Years of education: N/A     Occupational History     [employer]     Social History Main Topics    Smoking status: Never Smoker    Smokeless tobacco: None    Alcohol use No    Drug use: No    Sexual activity: No     Other Topics Concern    None     Social History Narrative       Current Outpatient Prescriptions   Medication Sig Dispense Refill    atorvastatin (LIPITOR) 20 MG tablet Take 1 tablet (20 mg total) by mouth once daily. 90 tablet 3    blood sugar diagnostic Strp 1 strip by Misc.(Non-Drug; Combo Route) route once daily. 300 strip 3    insulin glargine (LANTUS SOLOSTAR) 100 unit/mL (3 mL) InPn pen Inject 26 Units into the skin every evening. 35 Insulin Pen Subcutaneous daily 3 Box 3    lancets (ACCU-CHEK SOFTCLIX LANCETS) Misc 1 Device by Misc.(Non-Drug; Combo Route) route once daily. 300 each 3    lisinopril-hydrochlorothiazide (PRINZIDE,ZESTORETIC) 10-12.5 mg per tablet Take 1 tablet by mouth once daily. 90 tablet 3    metformin (GLUCOPHAGE-XR) 500 MG 24 hr tablet TAKE TWO TABLETS BY MOUTH TWICE A DAY WITH MEALS 120 tablet 3    miconazole (MICOTIN) 2  % cream Apply topically 2 (two) times daily. Apply to left foot after thorough cleaning with soap and water. Need to dry foot completely prior to placing antifungal cream on foot.  0    oxycodone (ROXICODONE) 5 MG immediate release tablet Take 1 tablet (5 mg total) by mouth every 6 (six) hours as needed for Pain. 10 tablet 0     No current facility-administered medications for this visit.        Review of patient's allergies indicates:  No Known Allergies      Review of Systems   Constitution: Negative for chills, fever and malaise/fatigue.   Cardiovascular: Negative for chest pain, leg swelling, orthopnea and palpitations.   Respiratory: Negative for cough, shortness of breath and wheezing.    Skin: Positive for color change, dry skin and nail changes. Negative for itching, poor wound healing and rash.   Musculoskeletal: Negative for arthritis, gout, joint pain, joint swelling, muscle weakness and myalgias.   Neurological: Positive for numbness, paresthesias and sensory change. Negative for disturbances in coordination, dizziness, focal weakness and tremors.           Objective:      Physical Exam   Cardiovascular:   Dorsalis pedis and posterior tibial pulses are diminished Bilaterally. Toes are cool to touch. Feet are warm proximally.There is decreased digital hair. Skin is atrophic, slightly hyperpigmented, and mildly edematous       Musculoskeletal:   Partial first ray amputation and 5th toe amputation, right. Mild forefoot edema. Ankle dorsiflexion to 5 degrees past neutral with knee extended, right.   5/5 MS at right achilles.        Neurological:   [place]-Nenita 5.07 monofilamant testing is diminished both feet. Sharp/dull sensation diminished Bilaterally. Light touch absent Bilaterally.       Skin: Skin is dry and intact. No abrasion, no bruising, no lesion and no rash noted. No erythema.             Assessment:       Encounter Diagnoses   Name Primary?    Diabetic polyneuropathy associated with type 2  diabetes mellitus Yes    History of amputation of hallux     Weakness of right leg    Wound is healed, no signs of infection.       Plan:       Indio was seen today for post-op evaluation and follow-up.    Diagnoses and all orders for this visit:    Diabetic polyneuropathy associated with type 2 diabetes mellitus  -     Ambulatory Referral to Physical/Occupational Therapy    History of amputation of hallux  -     Ambulatory Referral to Physical/Occupational Therapy    Weakness of right leg  -     Ambulatory Referral to Physical/Occupational Therapy      I counseled the patient on his conditions, their implications and medical management.    WBAT with custom shoes/inserts. Slowly break in shoes and check feet for redness, blistering 3-4 x per day for the first week.    PT for strengthening, gait training s/p achilles lengthening and deconditioning.    - Shoe inspection. Diabetic Foot Education. Patient reminded of the importance of good nutrition and blood sugar control to help prevent podiatric complications of diabetes. Patient instructed on proper foot hygeine. We discussed wearing proper shoe gear, daily foot inspections, never walking without protective shoe gear, never putting sharp instruments to feet,

## 2017-05-23 ENCOUNTER — CLINICAL SUPPORT (OUTPATIENT)
Dept: REHABILITATION | Facility: HOSPITAL | Age: 47
End: 2017-05-23
Attending: PODIATRIST
Payer: MEDICAID

## 2017-05-23 DIAGNOSIS — R29.898 RIGHT LEG WEAKNESS: ICD-10-CM

## 2017-05-23 PROCEDURE — 97110 THERAPEUTIC EXERCISES: CPT

## 2017-05-23 PROCEDURE — 97161 PT EVAL LOW COMPLEX 20 MIN: CPT

## 2017-05-23 NOTE — PLAN OF CARE
"[facility] [place] LOWER EXTREMITY EVALUATION    Date: 05/23/2017  Referring Physician: Ramirez Wilkes DPM  Visit #: 1   Start Time:  911  Stop Time:  1000    History     History of Present Illness: Patient with h/o DM, diabetic neuropathy with infection and osteomyelitis underwent achilles' tendon lengthening, I&D and 1st ray amputation in January 2017.  Was wearing boot, now in diabetic shoes.  Presents to PT with c/o decreased strength and decreased balance.  Working full time for [employer] (combination of sitting and standing)  Treatment Diagnosis: No diagnosis found.    Past Medical History:   Diagnosis Date    Diabetes mellitus type II     Diabetic foot ulcer     Hyperlipidemia     Hypertension     Peripheral neuropathy     Subacute osteomyelitis of right foot 1/12/2017    Streptococcus agalactiae, Actinomyces odontolyticus    Ulcerative colitis, unspecified      Precautions: diabetic  Prior Therapy: shoulder 2014   Sports/Recreational Activities: none stated    Subjective     Patient Therapy Goals: "I need my strength and my balance to get better"  Pain:   Location:denies pain    Objective     Gait: decreased tommy and step length, decreased right heel strike and toe off.  Increased AP and lateral sway    Transfers: (+) loss of balance with sit to stand from chair when not using UEs to assist    Posture: weight shifted to LLE     Right Left Right Left    Strength Strength AROM/PROM AROM/PROM   Hip flexion 5 5 WNL WNL   Knee ext 5 5 WNL WNL   Knee flexion 5 5 WNL WNL   DF 4- 5 5/10 WNL   PF 4- 4 20/30 WNL   INV 4 4+ 20/30 WNL   EV 4 4+ 10/20 WNL         Balance/Proprioception:  Single leg stance: R:  3 sec L:  5 sec    Palpation: no tenderness to palpation. Hypomobile forefoot    Sensation: light touch absent from midfoot distal, impaired below ankle    Treatment:   Seated towel inversion and eversion  Standing gastroc stretch  Single leg balance    History  Co-morbidities and personal factors that may " impact the plan of care Examination  Body Structures and Functions, activity limitations and participation restrictions that may impact the plan of care Clinical Presentation   Decision Making/ Complexity Score   Co-morbidities:                 Personal Factors:    Body Regions:  LE    Body Systems:   Musculoskeletal and neuromuscular            Activity limitations:   mobility, self care and domestic life     Participation Restrictions:   work, interpersonal interactions and community and social life        stable and uncomplicated low         Assessment       Initial Assessment:  Patient presents with limitations in  sensation, ROM, strength and balance affecting his ADLs, work, and functional mobility .  He   will benefit from outpatient physical therapy to improve his ROM, strength, balance and posture to enable him  to return to full pain free function  Rehab Potential: good    Short Term Goals (3 Weeks):   - Pt will increase ROM by 5 degrees  to enable normal gait and ADLs  - Pt will increase strength by 1/2 grade right ankle to enable normal gait and ADLs  - Able to walk 1/2 mile with less than 4/10 pain  - single leg stance 10 seconds to enable normal ADLs  - Supervised HEP    Long Term Goals (6 Weeks):   - Pt will increase ROM to WNL to to enable normal gait and ADLs  - Pt will be able to stand from a chair without using his UEs or losing his balance  -  Able to walk 2 miles without loss of balance  - single leg stance 30+ seconds to enable normal gait  - Independent HEP  Plan   Pt will be seen 2 times per week for 6 weeks. Recommended Treatment Plan will include:  Manual soft tissue and/or joint mobilization  Therapeutic Exercise  Neuromuscular re-education    Individualized Home Exercise Program  Modalities: heat/ice, estim, US as needed   Pt education  Therapist: Fadi Edward, PT    I CERTIFY THE NEED FOR THESE SERVICES FURNISHED UNDER THIS PLAN OF TREATMENT AND WHILE UNDER MY CARE    Physician's  comments: ________________________________________________________________________________________________________________________________________________    Physician's Name: ___________________________________

## 2017-05-23 NOTE — PROGRESS NOTES
"[facility] [place] LOWER EXTREMITY EVALUATION    Date: 05/23/2017  Referring Physician: Ramirez Wilkes DPM  Visit #: 1   Start Time:  911  Stop Time:  1000    History     History of Present Illness: Patient with h/o DM, diabetic neuropathy with infection and osteomyelitis underwent achilles' tendon lengthening, I&D and 1st ray amputation in January 2017.  Was wearing boot, now in diabetic shoes.  Presents to PT with c/o decreased strength and decreased balance.  Working full time for [employer] (combination of sitting and standing)  Treatment Diagnosis: No diagnosis found.    Past Medical History:   Diagnosis Date    Diabetes mellitus type II     Diabetic foot ulcer     Hyperlipidemia     Hypertension     Peripheral neuropathy     Subacute osteomyelitis of right foot 1/12/2017    Streptococcus agalactiae, Actinomyces odontolyticus    Ulcerative colitis, unspecified      Precautions: diabetic  Prior Therapy: shoulder 2014   Sports/Recreational Activities: none stated    Subjective     Patient Therapy Goals: "I need my strength and my balance to get better"  Pain:   Location:denies pain    Objective     Gait: decreased tommy and step length, decreased right heel strike and toe off.  Increased AP and lateral sway    Transfers: (+) loss of balance with sit to stand from chair when not using UEs to assist    Posture: weight shifted to LLE     Right Left Right Left    Strength Strength AROM/PROM AROM/PROM   Hip flexion 5 5 WNL WNL   Knee ext 5 5 WNL WNL   Knee flexion 5 5 WNL WNL   DF 4- 5 5/10 WNL   PF 4- 4 20/30 WNL   INV 4 4+ 20/30 WNL   EV 4 4+ 10/20 WNL         Balance/Proprioception:  Single leg stance: R:  3 sec L:  5 sec    Palpation: no tenderness to palpation. Hypomobile forefoot    Sensation: light touch absent from midfoot distal, impaired below ankle    Treatment:   Seated towel inversion and eversion  Standing gastroc stretch  Single leg balance    History  Co-morbidities and personal factors that may " impact the plan of care Examination  Body Structures and Functions, activity limitations and participation restrictions that may impact the plan of care Clinical Presentation   Decision Making/ Complexity Score   Co-morbidities:                 Personal Factors:    Body Regions:  LE    Body Systems:   Musculoskeletal and neuromuscular            Activity limitations:   mobility, self care and domestic life     Participation Restrictions:   work, interpersonal interactions and community and social life        stable and uncomplicated low         Assessment       Initial Assessment:  Patient presents with limitations in  sensation, ROM, strength and balance affecting his ADLs, work, and functional mobility .  He   will benefit from outpatient physical therapy to improve his ROM, strength, balance and posture to enable him  to return to full pain free function  Rehab Potential: good    Short Term Goals (3 Weeks):   - Pt will increase ROM by 5 degrees  to enable normal gait and ADLs  - Pt will increase strength by 1/2 grade right ankle to enable normal gait and ADLs  - Able to walk 1/2 mile with less than 4/10 pain  - single leg stance 10 seconds to enable normal ADLs  - Supervised HEP    Long Term Goals (6 Weeks):   - Pt will increase ROM to WNL to to enable normal gait and ADLs  - Pt will be able to stand from a chair without using his UEs or losing his balance  -  Able to walk 2 miles without loss of balance  - single leg stance 30+ seconds to enable normal gait  - Independent HEP  Plan   Pt will be seen 2 times per week for 6 weeks. Recommended Treatment Plan will include:  Manual soft tissue and/or joint mobilization  Therapeutic Exercise  Neuromuscular re-education    Individualized Home Exercise Program  Modalities: heat/ice, estim, US as needed   Pt education  Therapist: Fadi Edward, PT    I CERTIFY THE NEED FOR THESE SERVICES FURNISHED UNDER THIS PLAN OF TREATMENT AND WHILE UNDER MY CARE    Physician's  comments: ________________________________________________________________________________________________________________________________________________    Physician's Name: ___________________________________

## 2017-05-29 ENCOUNTER — OFFICE VISIT (OUTPATIENT)
Dept: INTERNAL MEDICINE | Facility: CLINIC | Age: 47
DRG: 638 | End: 2017-05-29
Payer: MEDICAID

## 2017-05-29 VITALS
OXYGEN SATURATION: 98 % | HEIGHT: 72 IN | DIASTOLIC BLOOD PRESSURE: 80 MMHG | HEART RATE: 101 BPM | BODY MASS INDEX: 25.92 KG/M2 | TEMPERATURE: 98 F | WEIGHT: 191.38 LBS | SYSTOLIC BLOOD PRESSURE: 112 MMHG

## 2017-05-29 DIAGNOSIS — R11.10 VOMITING, INTRACTABILITY OF VOMITING NOT SPECIFIED, PRESENCE OF NAUSEA NOT SPECIFIED, UNSPECIFIED VOMITING TYPE: Primary | ICD-10-CM

## 2017-05-29 DIAGNOSIS — Z79.4 TYPE 2 DIABETES MELLITUS WITH DIABETIC NEUROPATHY, WITH LONG-TERM CURRENT USE OF INSULIN: Chronic | ICD-10-CM

## 2017-05-29 DIAGNOSIS — E11.40 TYPE 2 DIABETES MELLITUS WITH DIABETIC NEUROPATHY, WITH LONG-TERM CURRENT USE OF INSULIN: Chronic | ICD-10-CM

## 2017-05-29 DIAGNOSIS — R19.7 DIARRHEA, UNSPECIFIED TYPE: ICD-10-CM

## 2017-05-29 LAB — GLUCOSE SERPL-MCNC: 244 MG/DL (ref 70–110)

## 2017-05-29 PROCEDURE — 99214 OFFICE O/P EST MOD 30 MIN: CPT | Mod: S$PBB,,, | Performed by: INTERNAL MEDICINE

## 2017-05-29 PROCEDURE — 99999 PR PBB SHADOW E&M-EST. PATIENT-LVL III: CPT | Mod: PBBFAC,,, | Performed by: INTERNAL MEDICINE

## 2017-05-29 RX ORDER — METOCLOPRAMIDE 10 MG/1
10 TABLET ORAL
Qty: 40 TABLET | Refills: 1 | Status: ON HOLD | OUTPATIENT
Start: 2017-05-29 | End: 2017-05-31

## 2017-05-29 RX ORDER — SODIUM CHLORIDE 9 MG/ML
INJECTION, SOLUTION INTRAVENOUS
Status: DISCONTINUED | OUTPATIENT
Start: 2017-05-29 | End: 2017-05-29

## 2017-05-29 NOTE — PROGRESS NOTES
"Subjective:       Patient ID: Indio Ryder Jr. is a 46 y.o. male.    Chief Complaint: Emesis (x 1 week otc pepto bismol no relief)    HPI   Began 8 days ago with diarrhea, nausea and vomiting.  Hasn't eaten solid foods since.  Vomited several times yesterday.  Just drank gatorade, diet sprite, but vomited a lot up several hours after eating.  No diarrhea for days.  No fever.  No abd pain.  Denies abd pain now.  No lightheadedness, presyncope.  He was able to work last week.  No fever.  May vomit OJ 8 h after drinking.    Feels like "gas in chest" all week, alleviated with seven up.  Stopped metformin 3 days ago but still taking insulin 26 units Lantus.  He hasn't checked sugar since becoming ill, but assumed readings were low due to not eating.    He'd like to restart Trulicity since back on work insurance, as liked the ease of once weekly injection, as forgets to take metformin daily.  Claims to be taking Lantus regularly now.    He doesn't feel bad now.  Denies lightheadedness syncope, weakness.    Review of Systems   Constitutional: Negative for activity change and unexpected weight change.   Respiratory: Negative for chest tightness and shortness of breath.    Cardiovascular: Negative for chest pain and leg swelling.   Gastrointestinal: Positive for diarrhea and vomiting. Negative for abdominal distention and abdominal pain.   Neurological: Negative for headaches.   Psychiatric/Behavioral: Negative for dysphoric mood.       Objective:      Physical Exam   Constitutional: He is oriented to person, place, and time. He appears well-developed and well-nourished. No distress.   Eyes: No scleral icterus.   Neck: No JVD present. No thyromegaly present.   Cardiovascular: Normal rate, regular rhythm and normal heart sounds.    Pulmonary/Chest: Effort normal and breath sounds normal. No respiratory distress. He has no wheezes. He has no rales.   Abdominal: Soft. He exhibits no mass. There is no tenderness. "   Musculoskeletal: He exhibits no edema.   Neurological: He is alert and oriented to person, place, and time.   Skin: He is not diaphoretic.   Psychiatric: He has a normal mood and affect. His behavior is normal.       108/82 standing  Assessment:       1. Vomiting, intractability of vomiting not specified, presence of nausea not specified, unspecified vomiting type - r/o gastroenteritis, gastroparesis   2. Type 2 diabetes mellitus with diabetic neuropathy, with long-term current use of insulin    3. Diarrhea, unspecified type     4.     Foot ulceration, healed   5.     H/o medication noncompliance  Plan:       Indio was seen today for emesis.    Diagnoses and all orders for this visit:    Vomiting, intractability of vomiting not specified, presence of nausea not specified, unspecified vomiting type    Type 2 diabetes mellitus with diabetic neuropathy, with long-term current use of insulin  -     POCT glucose    Diarrhea, unspecified type  -     CBC auto differential; Future  -     Comprehensive metabolic panel; Future  .    Other orders  -     metoclopramide HCl (REGLAN) 10 MG tablet; Take 1 tablet (10 mg total) by mouth 3 (three) times daily before meals.  -        accucheck - 244    Reglan trial.  Advance diet  Monitor sugars.  Once vomiting has resolved he may restart Trulicity.  Ph review tomorrow  25 min spent with pt, more than half in counseling

## 2017-05-29 NOTE — PATIENT INSTRUCTIONS
Take Reglan ( metoclopramide) 30 min before eating meals.    Monitor sugars.    Restart trulicity.    Send me a [facility] message in a few days to let me know how you are doing.

## 2017-05-30 ENCOUNTER — HOSPITAL ENCOUNTER (INPATIENT)
Facility: HOSPITAL | Age: 47
LOS: 4 days | Discharge: HOME | DRG: 638 | End: 2017-06-03
Attending: EMERGENCY MEDICINE | Admitting: HOSPITALIST
Payer: MEDICAID

## 2017-05-30 DIAGNOSIS — R11.10 VOMITING AND DIARRHEA: ICD-10-CM

## 2017-05-30 DIAGNOSIS — E11.10 DIABETIC KETOACIDOSIS WITHOUT COMA ASSOCIATED WITH TYPE 2 DIABETES MELLITUS: Primary | ICD-10-CM

## 2017-05-30 DIAGNOSIS — R19.7 VOMITING AND DIARRHEA: ICD-10-CM

## 2017-05-30 DIAGNOSIS — E10.10 DIABETIC KETOACIDOSIS WITHOUT COMA ASSOCIATED WITH TYPE 1 DIABETES MELLITUS: ICD-10-CM

## 2017-05-30 PROBLEM — R11.2 INTRACTABLE VOMITING WITH NAUSEA: Status: ACTIVE | Noted: 2017-05-30

## 2017-05-30 PROBLEM — E87.29 INCREASED ANION GAP METABOLIC ACIDOSIS: Status: ACTIVE | Noted: 2017-05-30

## 2017-05-30 PROBLEM — N17.9 AKI (ACUTE KIDNEY INJURY): Status: ACTIVE | Noted: 2017-05-30

## 2017-05-30 PROBLEM — E87.6 HYPOKALEMIA: Status: ACTIVE | Noted: 2017-05-30

## 2017-05-30 LAB
ALBUMIN SERPL BCP-MCNC: 3 G/DL
ALLENS TEST: ABNORMAL
ALP SERPL-CCNC: 217 U/L
ALT SERPL W/O P-5'-P-CCNC: 12 U/L
ANION GAP SERPL CALC-SCNC: 20 MMOL/L
ANION GAP SERPL CALC-SCNC: 23 MMOL/L
AST SERPL-CCNC: 15 U/L
B-OH-BUTYR BLD STRIP-SCNC: 3.7 MMOL/L
BASOPHILS # BLD AUTO: 0.01 K/UL
BASOPHILS NFR BLD: 0.1 %
BILIRUB SERPL-MCNC: 0.5 MG/DL
BUN SERPL-MCNC: 10 MG/DL
BUN SERPL-MCNC: 11 MG/DL
CALCIUM SERPL-MCNC: 9.3 MG/DL
CALCIUM SERPL-MCNC: 9.8 MG/DL
CHLORIDE SERPL-SCNC: 101 MMOL/L
CHLORIDE SERPL-SCNC: 99 MMOL/L
CO2 SERPL-SCNC: 18 MMOL/L
CO2 SERPL-SCNC: 20 MMOL/L
CREAT SERPL-MCNC: 1.4 MG/DL
CREAT SERPL-MCNC: 1.4 MG/DL
DIFFERENTIAL METHOD: ABNORMAL
EOSINOPHIL # BLD AUTO: 0 K/UL
EOSINOPHIL NFR BLD: 0.4 %
ERYTHROCYTE [DISTWIDTH] IN BLOOD BY AUTOMATED COUNT: 12.8 %
EST. GFR  (AFRICAN AMERICAN): >60 ML/MIN/1.73 M^2
EST. GFR  (AFRICAN AMERICAN): >60 ML/MIN/1.73 M^2
EST. GFR  (NON AFRICAN AMERICAN): 59 ML/MIN/1.73 M^2
EST. GFR  (NON AFRICAN AMERICAN): 59 ML/MIN/1.73 M^2
GLUCOSE SERPL-MCNC: 258 MG/DL
GLUCOSE SERPL-MCNC: 272 MG/DL
HCO3 UR-SCNC: 22 MMOL/L (ref 24–28)
HCT VFR BLD AUTO: 44.5 %
HCT VFR BLD CALC: 44 %PCV (ref 36–54)
HGB BLD-MCNC: 14.9 G/DL
LIPASE SERPL-CCNC: 42 U/L
LYMPHOCYTES # BLD AUTO: 1.6 K/UL
LYMPHOCYTES NFR BLD: 20.1 %
MAGNESIUM SERPL-MCNC: 1.7 MG/DL
MCH RBC QN AUTO: 29.6 PG
MCHC RBC AUTO-ENTMCNC: 33.5 %
MCV RBC AUTO: 89 FL
MONOCYTES # BLD AUTO: 0.3 K/UL
MONOCYTES NFR BLD: 4 %
NEUTROPHILS # BLD AUTO: 6 K/UL
NEUTROPHILS NFR BLD: 75 %
PCO2 BLDA: 38.8 MMHG (ref 35–45)
PH SMN: 7.36 [PH] (ref 7.35–7.45)
PHOSPHATE SERPL-MCNC: 3 MG/DL
PHOSPHATE SERPL-MCNC: 3.6 MG/DL
PLATELET # BLD AUTO: 311 K/UL
PMV BLD AUTO: 10.8 FL
PO2 BLDA: 21 MMHG (ref 40–60)
POC BE: -3 MMOL/L
POC IONIZED CALCIUM: 1.12 MMOL/L (ref 1.06–1.42)
POC SATURATED O2: 34 % (ref 95–100)
POC TCO2: 23 MMOL/L (ref 24–29)
POCT GLUCOSE: 113 MG/DL (ref 70–110)
POCT GLUCOSE: 242 MG/DL (ref 70–110)
POCT GLUCOSE: 250 MG/DL (ref 70–110)
POCT GLUCOSE: 267 MG/DL (ref 70–110)
POCT GLUCOSE: 85 MG/DL (ref 70–110)
POTASSIUM BLD-SCNC: 3 MMOL/L (ref 3.5–5.1)
POTASSIUM SERPL-SCNC: 3.3 MMOL/L
POTASSIUM SERPL-SCNC: 4.2 MMOL/L
PROT SERPL-MCNC: 8.4 G/DL
RBC # BLD AUTO: 5.03 M/UL
SAMPLE: ABNORMAL
SITE: ABNORMAL
SODIUM BLD-SCNC: 141 MMOL/L (ref 136–145)
SODIUM SERPL-SCNC: 140 MMOL/L
SODIUM SERPL-SCNC: 141 MMOL/L
WBC # BLD AUTO: 8.05 K/UL

## 2017-05-30 PROCEDURE — 99900035 HC TECH TIME PER 15 MIN (STAT)

## 2017-05-30 PROCEDURE — C9113 INJ PANTOPRAZOLE SODIUM, VIA: HCPCS | Performed by: HOSPITALIST

## 2017-05-30 PROCEDURE — 96375 TX/PRO/DX INJ NEW DRUG ADDON: CPT

## 2017-05-30 PROCEDURE — 63600175 PHARM REV CODE 636 W HCPCS: Performed by: HOSPITALIST

## 2017-05-30 PROCEDURE — 85014 HEMATOCRIT: CPT

## 2017-05-30 PROCEDURE — 83735 ASSAY OF MAGNESIUM: CPT

## 2017-05-30 PROCEDURE — 85025 COMPLETE CBC W/AUTO DIFF WBC: CPT

## 2017-05-30 PROCEDURE — 82962 GLUCOSE BLOOD TEST: CPT

## 2017-05-30 PROCEDURE — 83690 ASSAY OF LIPASE: CPT

## 2017-05-30 PROCEDURE — 82010 KETONE BODYS QUAN: CPT

## 2017-05-30 PROCEDURE — 25000003 PHARM REV CODE 250: Performed by: EMERGENCY MEDICINE

## 2017-05-30 PROCEDURE — 99284 EMERGENCY DEPT VISIT MOD MDM: CPT | Mod: 25

## 2017-05-30 PROCEDURE — 96365 THER/PROPH/DIAG IV INF INIT: CPT

## 2017-05-30 PROCEDURE — 99223 1ST HOSP IP/OBS HIGH 75: CPT | Mod: ,,, | Performed by: HOSPITALIST

## 2017-05-30 PROCEDURE — 93010 ELECTROCARDIOGRAM REPORT: CPT | Mod: ,,, | Performed by: INTERNAL MEDICINE

## 2017-05-30 PROCEDURE — 82803 BLOOD GASES ANY COMBINATION: CPT

## 2017-05-30 PROCEDURE — 84132 ASSAY OF SERUM POTASSIUM: CPT

## 2017-05-30 PROCEDURE — 96368 THER/DIAG CONCURRENT INF: CPT

## 2017-05-30 PROCEDURE — 63600175 PHARM REV CODE 636 W HCPCS: Performed by: EMERGENCY MEDICINE

## 2017-05-30 PROCEDURE — 25000003 PHARM REV CODE 250: Performed by: HOSPITALIST

## 2017-05-30 PROCEDURE — 80053 COMPREHEN METABOLIC PANEL: CPT

## 2017-05-30 PROCEDURE — 99291 CRITICAL CARE FIRST HOUR: CPT | Mod: ,,, | Performed by: EMERGENCY MEDICINE

## 2017-05-30 PROCEDURE — 96366 THER/PROPH/DIAG IV INF ADDON: CPT

## 2017-05-30 PROCEDURE — 80048 BASIC METABOLIC PNL TOTAL CA: CPT

## 2017-05-30 PROCEDURE — 12000002 HC ACUTE/MED SURGE SEMI-PRIVATE ROOM

## 2017-05-30 PROCEDURE — 84100 ASSAY OF PHOSPHORUS: CPT | Mod: 91

## 2017-05-30 PROCEDURE — 82330 ASSAY OF CALCIUM: CPT

## 2017-05-30 PROCEDURE — 96361 HYDRATE IV INFUSION ADD-ON: CPT

## 2017-05-30 PROCEDURE — 84295 ASSAY OF SERUM SODIUM: CPT

## 2017-05-30 PROCEDURE — 96374 THER/PROPH/DIAG INJ IV PUSH: CPT | Mod: 59

## 2017-05-30 PROCEDURE — 83036 HEMOGLOBIN GLYCOSYLATED A1C: CPT

## 2017-05-30 RX ORDER — DEXTROSE MONOHYDRATE, SODIUM CHLORIDE, AND POTASSIUM CHLORIDE 50; 1.49; 4.5 G/1000ML; G/1000ML; G/1000ML
INJECTION, SOLUTION INTRAVENOUS CONTINUOUS
Status: DISCONTINUED | OUTPATIENT
Start: 2017-05-30 | End: 2017-05-31

## 2017-05-30 RX ORDER — PANTOPRAZOLE SODIUM 40 MG/10ML
40 INJECTION, POWDER, LYOPHILIZED, FOR SOLUTION INTRAVENOUS DAILY
Status: DISCONTINUED | OUTPATIENT
Start: 2017-05-30 | End: 2017-06-03 | Stop reason: HOSPADM

## 2017-05-30 RX ORDER — POTASSIUM CHLORIDE 7.45 MG/ML
10 INJECTION INTRAVENOUS ONCE
Status: COMPLETED | OUTPATIENT
Start: 2017-05-30 | End: 2017-05-31

## 2017-05-30 RX ORDER — METOCLOPRAMIDE HYDROCHLORIDE 5 MG/ML
5 INJECTION INTRAMUSCULAR; INTRAVENOUS EVERY 6 HOURS PRN
Status: DISCONTINUED | OUTPATIENT
Start: 2017-05-30 | End: 2017-06-03

## 2017-05-30 RX ORDER — ONDANSETRON 2 MG/ML
4 INJECTION INTRAMUSCULAR; INTRAVENOUS
Status: COMPLETED | OUTPATIENT
Start: 2017-05-30 | End: 2017-05-30

## 2017-05-30 RX ORDER — LABETALOL HYDROCHLORIDE 5 MG/ML
10 INJECTION, SOLUTION INTRAVENOUS EVERY 6 HOURS PRN
Status: DISCONTINUED | OUTPATIENT
Start: 2017-05-30 | End: 2017-06-03 | Stop reason: HOSPADM

## 2017-05-30 RX ORDER — ONDANSETRON 2 MG/ML
8 INJECTION INTRAMUSCULAR; INTRAVENOUS EVERY 6 HOURS PRN
Status: DISCONTINUED | OUTPATIENT
Start: 2017-05-30 | End: 2017-06-03

## 2017-05-30 RX ORDER — MAGNESIUM SULFATE HEPTAHYDRATE 40 MG/ML
2 INJECTION, SOLUTION INTRAVENOUS ONCE
Status: COMPLETED | OUTPATIENT
Start: 2017-05-30 | End: 2017-05-30

## 2017-05-30 RX ORDER — ENOXAPARIN SODIUM 100 MG/ML
40 INJECTION SUBCUTANEOUS EVERY 24 HOURS
Status: DISCONTINUED | OUTPATIENT
Start: 2017-05-31 | End: 2017-06-03 | Stop reason: HOSPADM

## 2017-05-30 RX ADMIN — ONDANSETRON 4 MG: 2 INJECTION INTRAMUSCULAR; INTRAVENOUS at 06:05

## 2017-05-30 RX ADMIN — PANTOPRAZOLE SODIUM 40 MG: 40 INJECTION, POWDER, FOR SOLUTION INTRAVENOUS at 09:05

## 2017-05-30 RX ADMIN — SODIUM CHLORIDE 1000 ML: 0.9 INJECTION, SOLUTION INTRAVENOUS at 06:05

## 2017-05-30 RX ADMIN — SODIUM CHLORIDE 1000 ML: 0.9 INJECTION, SOLUTION INTRAVENOUS at 07:05

## 2017-05-30 RX ADMIN — MAGNESIUM SULFATE HEPTAHYDRATE 2 G: 40 INJECTION, SOLUTION INTRAVENOUS at 09:05

## 2017-05-30 RX ADMIN — ONDANSETRON 8 MG: 2 INJECTION INTRAMUSCULAR; INTRAVENOUS at 09:05

## 2017-05-30 RX ADMIN — DEXTROSE MONOHYDRATE, SODIUM CHLORIDE, AND POTASSIUM CHLORIDE: 50; 4.5; 1.49 INJECTION, SOLUTION INTRAVENOUS at 11:05

## 2017-05-30 NOTE — ED PROVIDER NOTES
Encounter Date: 5/30/2017    SCRIBE #1 NOTE: I, Geetha Mccurdy, am scribing for, and in the presence of, Dr. Byrd.       History     Chief Complaint   Patient presents with    Emesis     vomiting and diarrhea for over 1 week diabetic not taking insulin, not checking sugars, crying 'i feel bad,      Review of patient's allergies indicates:  No Known Allergies    The patient is a 46 y.o. male with hx of DM and HTN that presents to the ED complaining of vomiting and diarrhea for over a week. He reports 5-6 episodes of vomiting today, and describes his diarrhea as watery and not bloody. He also states he has been unable to tolerate PO intake. Denies any dysuria. Pt states he went to the clinic yesterday, was prescribed metoclopramide which did not improve his symptoms. He has not been taking his insulin this week because he has been unable to tolerate PO. No significant sick contacts.      The history is provided by the patient and medical records.     Past Medical History:   Diagnosis Date    Diabetes mellitus type II     Diabetic foot ulcer     Hyperlipidemia     Hypertension     Peripheral neuropathy     Subacute osteomyelitis of right foot 1/12/2017    Streptococcus agalactiae, Actinomyces odontolyticus    Ulcerative colitis, unspecified      Past Surgical History:   Procedure Laterality Date    TOE AMPUTATION Right 06/05/2015    TOE AMPUTATION Right 01/19/2017     Family History   Problem Relation Age of Onset    Adopted: Yes    Hypertension Mother     Cancer Mother      breast    Diabetes Mother     Hypertension Father     Cataracts Father     Diabetes Sister     Hypertension Maternal Aunt      Social History   Substance Use Topics    Smoking status: Never Smoker    Smokeless tobacco: Not on file    Alcohol use No     Review of Systems   Constitutional: Negative for fever.   HENT: Negative for nosebleeds.    Respiratory: Negative for cough.    Cardiovascular: Negative for chest pain.    Gastrointestinal: Positive for abdominal pain, diarrhea and vomiting.   Genitourinary: Negative for dysuria.   Musculoskeletal: Negative for back pain.   Skin: Negative for rash.   Neurological: Negative for speech difficulty.   Hematological: Does not bruise/bleed easily.   All other systems reviewed and are negative.      Physical Exam     Initial Vitals [05/30/17 1739]   BP Pulse Resp Temp SpO2   (!) 145/91 (!) 130 (!) 22 98.3 °F (36.8 °C) --     Physical Exam    Nursing note and vitals reviewed.  Constitutional: He appears well-developed and well-nourished. No distress.   HENT:   Head: Normocephalic and atraumatic.   Mouth/Throat: Mucous membranes are dry.   Eyes: Conjunctivae are normal.   Neck: Normal range of motion.   Cardiovascular: Regular rhythm. Tachycardia present.    Pulmonary/Chest: Breath sounds normal. No respiratory distress.   Abdominal: Soft. There is tenderness (mild). There is no rebound and no guarding.   Musculoskeletal: Normal range of motion.   Neurological: He is alert and oriented to person, place, and time.   Skin: Skin is warm and dry.         ED Course   Procedures  Labs Reviewed   CBC W/ AUTO DIFFERENTIAL - Abnormal; Notable for the following:        Result Value    Gran% 75.0 (*)     All other components within normal limits   COMPREHENSIVE METABOLIC PANEL - Abnormal; Notable for the following:     CO2 18 (*)     Glucose 272 (*)     Albumin 3.0 (*)     Alkaline Phosphatase 217 (*)     Anion Gap 23 (*)     eGFR if non  59.0 (*)     All other components within normal limits   BETA - HYDROXYBUTYRATE, SERUM - Abnormal; Notable for the following:     Beta-Hydroxybutyrate 3.7 (*)     All other components within normal limits   BASIC METABOLIC PANEL - Abnormal; Notable for the following:     Potassium 3.3 (*)     CO2 20 (*)     Glucose 258 (*)     Anion Gap 20 (*)     eGFR if non  59.0 (*)     All other components within normal limits   POCT GLUCOSE -  Abnormal; Notable for the following:     POCT Glucose 250 (*)     All other components within normal limits   POCT GLUCOSE - Abnormal; Notable for the following:     POCT Glucose 267 (*)     All other components within normal limits   ISTAT PROCEDURE - Abnormal; Notable for the following:     POC PO2 21 (*)     POC HCO3 22.0 (*)     POC SATURATED O2 34 (*)     POC Potassium 3.0 (*)     POC TCO2 23 (*)     All other components within normal limits   LIPASE   MAGNESIUM   PHOSPHORUS   PHOSPHORUS   MAGNESIUM   PHOSPHORUS    Narrative:     ADD ON PHOS #[id number] PER DR CAIO MARTINEZ 05/30/2017  19:51   MG ORDER [id number] ALREADY DONE.    URINALYSIS, REFLEX TO URINE CULTURE   HEMOGLOBIN A1C   POCT GLUCOSE MONITORING CONTINUOUS   POCT GLUCOSE MONITORING CONTINUOUS     EKG Readings: (Independently Interpreted)   Initial Reading: No STEMI. Rhythm: Sinus Tachycardia. Heart Rate: 102. ST Segments: Non-Specific ST Segment Depression. Axis: Right Axis Deviation.          Medical Decision Making:   History:   Old Medical Records: I decided to obtain old medical records.  Initial Assessment:   Emergent evaluation of vomiting, diarrhea and hyperglycemia. My initial concerns are DKA, electrolyte abnormality, volume depletion, vomiting and diarrhea, maybe a viral gastroenteritis. Lower suspicion for acute intraabdominal process. There is no indication for imaging at this time but will reassess. Will give IV fluids and check lab work.    Clinical Tests:   Lab Tests: Ordered and Reviewed  Medical Tests: Ordered and Reviewed            Scribe Attestation:   Scribe #1: I performed the above scribed service and the documentation accurately describes the services I performed. I attest to the accuracy of the note.    Attending Attestation:         Attending Critical Care:   Critical Care Times:   Direct Patient Care (initial evaluation, reassessments, and time considering the  case)................................................................25 minutes.   Additional History from reviewing old medical records or taking additional history from the family, EMS, PCP, etc.......................5 minutes.   Ordering, Reviewing, and Interpreting Diagnostic Studies...............................................................................................................5 minutes.   Documentation..................................................................................................................................................................................5 minutes.   Consultation with other Physicians. .................................................................................................................................................5 minutes.   ==============================================================  · Total Critical Care Time - exclusive of procedural time: 45 minutes.  ==============================================================  Critical care was necessary to treat or prevent imminent or life-threatening deterioration of the following conditions: metabolic crisis.     Physician Attestation for Scribe:  Physician Attestation Statement for Scribe #1: I, Dr. Byrd, reviewed documentation, as scribed by Geetha Mccurdy in my presence, and it is both accurate and complete.         Attending ED Notes:   7:26 PM. Labs are consistent with DKA. Patient has elevated anion gap, acidosis and elevated Beta hydroxybutrate. Will start insulin infusion protocol and admit to the hospital.          ED Course     Clinical Impression:   The primary encounter diagnosis was Diabetic ketoacidosis without coma associated with type 1 diabetes mellitus. A diagnosis of Vomiting and diarrhea was also pertinent to this visit.    Disposition:   Disposition: Admitted  Condition: Bruno Byrd MD  05/30/17 2022

## 2017-05-30 NOTE — ED TRIAGE NOTES
Patient received with complaint of nausea, vomiting, and prior diarrhea for seven days.  States he can not keep anything on his stomach vomiting approximately 5 times today content of liquid he has been attempting to drink.  Patient with ongoing nausea despite antiemetic prescribed yesterday.  Patient reports diarrhea has stopped, last episode last Friday.     No LDA's in place on arrival to department.    Family/sister present.    Pain:  Aching all over 7/10.      Psychosocial:  Patient is calm and cooperative.  Patients insight and judgement are appropriate to situation.  Appears clean, well maintained, with clothing appropriate to environment.  No evidence of delusions, hallucinations, or psychosis.    Neuro:  Eyes open spontaneously.  Awake, alert, oriented x 4.  Speech clear and appropriate.  Tolerating saliva secretions well.  Able to follow commands, demonstrating ability to actively and appropriately communicate within context of current conversation.  Symmetrical facial muscles.  Moving all extremities well with no noted weakness.  Adequate muscle tone present.    Movement is purposeful.       Airway:  Bilateral chest rise and fall.  RR regular and non-labored.  Air entry patent and clear x 5 lobes of the lungs.  No crepitus or subcutaneous emphysema noted on palpation.      Circulatory:  Skin warm, dry, and pink.  Apical and radial pulses strong and regular.  Capillary refill/skin blanching less than 3 seconds to distal of 4 extremities.    Abdomen:  Abdomen soft and non distended.  Hyperactive BS x 4 quadrants.  Vomiting and diarrhea as noted above.    Urinary:  Patient reports routine urination without pain, frequency, or urgency.  Voids independently.  Reports urine appears gabriel/yellow in color.    Extremities:  No redness, heat, swelling, deformity, or pain.    Skin:  Intact with no bruising/discolorations noted.

## 2017-05-31 LAB
ANION GAP SERPL CALC-SCNC: 11 MMOL/L
ANION GAP SERPL CALC-SCNC: 13 MMOL/L
ANION GAP SERPL CALC-SCNC: 14 MMOL/L
ANION GAP SERPL CALC-SCNC: 16 MMOL/L
ANION GAP SERPL CALC-SCNC: 18 MMOL/L
BASOPHILS # BLD AUTO: 0.01 K/UL
BASOPHILS NFR BLD: 0.1 %
BUN SERPL-MCNC: 10 MG/DL
BUN SERPL-MCNC: 6 MG/DL
BUN SERPL-MCNC: 8 MG/DL
BUN SERPL-MCNC: 8 MG/DL
BUN SERPL-MCNC: 9 MG/DL
CALCIUM SERPL-MCNC: 8.9 MG/DL
CALCIUM SERPL-MCNC: 8.9 MG/DL
CALCIUM SERPL-MCNC: 9 MG/DL
CALCIUM SERPL-MCNC: 9 MG/DL
CALCIUM SERPL-MCNC: 9.1 MG/DL
CHLORIDE SERPL-SCNC: 103 MMOL/L
CHLORIDE SERPL-SCNC: 103 MMOL/L
CHLORIDE SERPL-SCNC: 104 MMOL/L
CO2 SERPL-SCNC: 21 MMOL/L
CO2 SERPL-SCNC: 22 MMOL/L
CO2 SERPL-SCNC: 23 MMOL/L
CO2 SERPL-SCNC: 24 MMOL/L
CO2 SERPL-SCNC: 24 MMOL/L
CREAT SERPL-MCNC: 1.2 MG/DL
CREAT SERPL-MCNC: 1.3 MG/DL
CREAT SERPL-MCNC: 1.4 MG/DL
CREAT UR-MCNC: 120 MG/DL
CREAT UR-MCNC: 120 MG/DL
DIFFERENTIAL METHOD: ABNORMAL
EOSINOPHIL # BLD AUTO: 0 K/UL
EOSINOPHIL NFR BLD: 0.5 %
ERYTHROCYTE [DISTWIDTH] IN BLOOD BY AUTOMATED COUNT: 12.7 %
EST. GFR  (AFRICAN AMERICAN): >60 ML/MIN/1.73 M^2
EST. GFR  (NON AFRICAN AMERICAN): 59 ML/MIN/1.73 M^2
EST. GFR  (NON AFRICAN AMERICAN): >60 ML/MIN/1.73 M^2
ESTIMATED AVG GLUCOSE: 398 MG/DL
GLUCOSE SERPL-MCNC: 180 MG/DL
GLUCOSE SERPL-MCNC: 213 MG/DL
GLUCOSE SERPL-MCNC: 230 MG/DL
GLUCOSE SERPL-MCNC: 268 MG/DL
GLUCOSE SERPL-MCNC: 277 MG/DL
HBA1C MFR BLD HPLC: 15.5 %
HCT VFR BLD AUTO: 39.2 %
HGB BLD-MCNC: 13 G/DL
LYMPHOCYTES # BLD AUTO: 2.3 K/UL
LYMPHOCYTES NFR BLD: 29.4 %
MAGNESIUM SERPL-MCNC: 1.8 MG/DL
MCH RBC QN AUTO: 29.2 PG
MCHC RBC AUTO-ENTMCNC: 33.2 %
MCV RBC AUTO: 88 FL
MICROALBUMIN UR DL<=1MG/L-MCNC: 86 UG/ML
MICROALBUMIN/CREATININE RATIO: 71.7 UG/MG
MONOCYTES # BLD AUTO: 0.5 K/UL
MONOCYTES NFR BLD: 5.7 %
NEUTROPHILS # BLD AUTO: 5.1 K/UL
NEUTROPHILS NFR BLD: 63.8 %
PHOSPHATE SERPL-MCNC: 3 MG/DL
PLATELET # BLD AUTO: 289 K/UL
PMV BLD AUTO: 10.3 FL
POCT GLUCOSE: 107 MG/DL (ref 70–110)
POCT GLUCOSE: 115 MG/DL (ref 70–110)
POCT GLUCOSE: 140 MG/DL (ref 70–110)
POCT GLUCOSE: 167 MG/DL (ref 70–110)
POCT GLUCOSE: 173 MG/DL (ref 70–110)
POCT GLUCOSE: 183 MG/DL (ref 70–110)
POCT GLUCOSE: 208 MG/DL (ref 70–110)
POCT GLUCOSE: 219 MG/DL (ref 70–110)
POCT GLUCOSE: 220 MG/DL (ref 70–110)
POCT GLUCOSE: 230 MG/DL (ref 70–110)
POCT GLUCOSE: 231 MG/DL (ref 70–110)
POCT GLUCOSE: 240 MG/DL (ref 70–110)
POCT GLUCOSE: 245 MG/DL (ref 70–110)
POCT GLUCOSE: 246 MG/DL (ref 70–110)
POCT GLUCOSE: 260 MG/DL (ref 70–110)
POCT GLUCOSE: 271 MG/DL (ref 70–110)
POCT GLUCOSE: 276 MG/DL (ref 70–110)
POCT GLUCOSE: 283 MG/DL (ref 70–110)
POTASSIUM SERPL-SCNC: 3.3 MMOL/L
POTASSIUM SERPL-SCNC: 3.4 MMOL/L
POTASSIUM SERPL-SCNC: 3.6 MMOL/L
POTASSIUM SERPL-SCNC: 3.7 MMOL/L
POTASSIUM SERPL-SCNC: 3.9 MMOL/L
RBC # BLD AUTO: 4.45 M/UL
SODIUM SERPL-SCNC: 139 MMOL/L
SODIUM SERPL-SCNC: 140 MMOL/L
SODIUM SERPL-SCNC: 141 MMOL/L
SODIUM SERPL-SCNC: 141 MMOL/L
SODIUM SERPL-SCNC: 143 MMOL/L
SODIUM UR-SCNC: 130 MMOL/L
UUN UR-MCNC: 855 MG/DL
WBC # BLD AUTO: 7.96 K/UL

## 2017-05-31 PROCEDURE — 84100 ASSAY OF PHOSPHORUS: CPT

## 2017-05-31 PROCEDURE — 20600001 HC STEP DOWN PRIVATE ROOM

## 2017-05-31 PROCEDURE — 84540 ASSAY OF URINE/UREA-N: CPT

## 2017-05-31 PROCEDURE — 63600175 PHARM REV CODE 636 W HCPCS: Performed by: HOSPITALIST

## 2017-05-31 PROCEDURE — 82570 ASSAY OF URINE CREATININE: CPT

## 2017-05-31 PROCEDURE — 63600175 PHARM REV CODE 636 W HCPCS: Performed by: ANESTHESIOLOGY

## 2017-05-31 PROCEDURE — 84300 ASSAY OF URINE SODIUM: CPT

## 2017-05-31 PROCEDURE — 83735 ASSAY OF MAGNESIUM: CPT

## 2017-05-31 PROCEDURE — 25000003 PHARM REV CODE 250: Performed by: HOSPITALIST

## 2017-05-31 PROCEDURE — 85025 COMPLETE CBC W/AUTO DIFF WBC: CPT

## 2017-05-31 PROCEDURE — 25000003 PHARM REV CODE 250: Performed by: ANESTHESIOLOGY

## 2017-05-31 PROCEDURE — 80048 BASIC METABOLIC PNL TOTAL CA: CPT

## 2017-05-31 PROCEDURE — 80048 BASIC METABOLIC PNL TOTAL CA: CPT | Mod: 91

## 2017-05-31 PROCEDURE — C9113 INJ PANTOPRAZOLE SODIUM, VIA: HCPCS | Performed by: HOSPITALIST

## 2017-05-31 PROCEDURE — 36415 COLL VENOUS BLD VENIPUNCTURE: CPT

## 2017-05-31 RX ORDER — INSULIN ASPART 100 [IU]/ML
5 INJECTION, SOLUTION INTRAVENOUS; SUBCUTANEOUS
Status: DISCONTINUED | OUTPATIENT
Start: 2017-05-31 | End: 2017-06-01

## 2017-05-31 RX ORDER — METOCLOPRAMIDE 10 MG/1
10 TABLET ORAL
Status: DISCONTINUED | OUTPATIENT
Start: 2017-05-31 | End: 2017-05-31

## 2017-05-31 RX ORDER — POTASSIUM CHLORIDE 7.45 MG/ML
10 INJECTION INTRAVENOUS
Status: COMPLETED | OUTPATIENT
Start: 2017-05-31 | End: 2017-05-31

## 2017-05-31 RX ORDER — GLUCAGON 1 MG
1 KIT INJECTION
Status: DISCONTINUED | OUTPATIENT
Start: 2017-05-31 | End: 2017-06-03 | Stop reason: HOSPADM

## 2017-05-31 RX ORDER — ATORVASTATIN CALCIUM 20 MG/1
20 TABLET, FILM COATED ORAL DAILY
Status: DISCONTINUED | OUTPATIENT
Start: 2017-05-31 | End: 2017-06-03 | Stop reason: HOSPADM

## 2017-05-31 RX ORDER — IBUPROFEN 200 MG
16 TABLET ORAL
Status: DISCONTINUED | OUTPATIENT
Start: 2017-05-31 | End: 2017-06-03 | Stop reason: HOSPADM

## 2017-05-31 RX ORDER — POTASSIUM CHLORIDE 7.45 MG/ML
10 INJECTION INTRAVENOUS
Status: DISCONTINUED | OUTPATIENT
Start: 2017-05-31 | End: 2017-05-31

## 2017-05-31 RX ORDER — IBUPROFEN 200 MG
24 TABLET ORAL
Status: DISCONTINUED | OUTPATIENT
Start: 2017-05-31 | End: 2017-06-03 | Stop reason: HOSPADM

## 2017-05-31 RX ORDER — MAGNESIUM SULFATE HEPTAHYDRATE 40 MG/ML
2 INJECTION, SOLUTION INTRAVENOUS ONCE
Status: COMPLETED | OUTPATIENT
Start: 2017-05-31 | End: 2017-05-31

## 2017-05-31 RX ORDER — POTASSIUM CHLORIDE 750 MG/1
20 CAPSULE, EXTENDED RELEASE ORAL 2 TIMES DAILY
Status: COMPLETED | OUTPATIENT
Start: 2017-05-31 | End: 2017-05-31

## 2017-05-31 RX ORDER — INSULIN ASPART 100 [IU]/ML
0-5 INJECTION, SOLUTION INTRAVENOUS; SUBCUTANEOUS
Status: DISCONTINUED | OUTPATIENT
Start: 2017-05-31 | End: 2017-06-03 | Stop reason: HOSPADM

## 2017-05-31 RX ORDER — LISINOPRIL AND HYDROCHLOROTHIAZIDE 10; 12.5 MG/1; MG/1
1 TABLET ORAL DAILY
Status: DISCONTINUED | OUTPATIENT
Start: 2017-05-31 | End: 2017-05-31

## 2017-05-31 RX ORDER — CALCIUM CARBONATE 200(500)MG
500 TABLET,CHEWABLE ORAL DAILY PRN
Status: DISCONTINUED | OUTPATIENT
Start: 2017-05-31 | End: 2017-06-03 | Stop reason: HOSPADM

## 2017-05-31 RX ORDER — AMLODIPINE BESYLATE 5 MG/1
5 TABLET ORAL DAILY
Status: DISCONTINUED | OUTPATIENT
Start: 2017-05-31 | End: 2017-06-03 | Stop reason: HOSPADM

## 2017-05-31 RX ADMIN — POTASSIUM CHLORIDE 20 MEQ: 750 CAPSULE, EXTENDED RELEASE ORAL at 09:05

## 2017-05-31 RX ADMIN — POTASSIUM CHLORIDE 10 MEQ: 10 INJECTION, SOLUTION INTRAVENOUS at 07:05

## 2017-05-31 RX ADMIN — AMLODIPINE BESYLATE 5 MG: 5 TABLET ORAL at 10:05

## 2017-05-31 RX ADMIN — INSULIN ASPART 3 UNITS: 100 INJECTION, SOLUTION INTRAVENOUS; SUBCUTANEOUS at 05:05

## 2017-05-31 RX ADMIN — POTASSIUM CHLORIDE 20 MEQ: 750 CAPSULE, EXTENDED RELEASE ORAL at 08:05

## 2017-05-31 RX ADMIN — ONDANSETRON 8 MG: 2 INJECTION INTRAMUSCULAR; INTRAVENOUS at 07:05

## 2017-05-31 RX ADMIN — POTASSIUM CHLORIDE 10 MEQ: 10 INJECTION, SOLUTION INTRAVENOUS at 06:05

## 2017-05-31 RX ADMIN — INSULIN ASPART 1 UNITS: 100 INJECTION, SOLUTION INTRAVENOUS; SUBCUTANEOUS at 09:05

## 2017-05-31 RX ADMIN — POTASSIUM CHLORIDE 10 MEQ: 10 INJECTION, SOLUTION INTRAVENOUS at 04:05

## 2017-05-31 RX ADMIN — ATORVASTATIN CALCIUM 20 MG: 20 TABLET, FILM COATED ORAL at 08:05

## 2017-05-31 RX ADMIN — CALCIUM CARBONATE (ANTACID) CHEW TAB 500 MG 500 MG: 500 CHEW TAB at 07:05

## 2017-05-31 RX ADMIN — DEXTROSE MONOHYDRATE, SODIUM CHLORIDE, AND POTASSIUM CHLORIDE: 50; 4.5; 1.49 INJECTION, SOLUTION INTRAVENOUS at 09:05

## 2017-05-31 RX ADMIN — ALUMINUM HYDROXIDE, MAGNESIUM HYDROXIDE, AND SIMETHICONE 50 ML: 200; 200; 20 SUSPENSION ORAL at 02:05

## 2017-05-31 RX ADMIN — ENOXAPARIN SODIUM 40 MG: 100 INJECTION SUBCUTANEOUS at 08:05

## 2017-05-31 RX ADMIN — PANTOPRAZOLE SODIUM 40 MG: 40 INJECTION, POWDER, FOR SOLUTION INTRAVENOUS at 08:05

## 2017-05-31 RX ADMIN — METOCLOPRAMIDE 5 MG: 5 INJECTION, SOLUTION INTRAMUSCULAR; INTRAVENOUS at 08:05

## 2017-05-31 RX ADMIN — MAGNESIUM SULFATE IN WATER 2 G: 40 INJECTION, SOLUTION INTRAVENOUS at 03:05

## 2017-05-31 RX ADMIN — INSULIN ASPART 5 UNITS: 100 INJECTION, SOLUTION INTRAVENOUS; SUBCUTANEOUS at 05:05

## 2017-05-31 RX ADMIN — POTASSIUM CHLORIDE 10 MEQ: 10 INJECTION, SOLUTION INTRAVENOUS at 01:05

## 2017-05-31 RX ADMIN — INSULIN DETEMIR 26 UNITS: 100 INJECTION, SOLUTION SUBCUTANEOUS at 01:05

## 2017-05-31 NOTE — MEDICAL/APP STUDENT
Progress Note  Hospital Medicine                                                              Team: [facility] MED 4    Patient Name: Indio Ryder Jr.  YOB: 1970    Admit Date: 5/30/2017                     LOS: 1    SUBJECTIVE:     Reason for Admission:  Diabetic ketoacidosis without coma associated with type 2 diabetes mellitus    45 yo AAM w/ hx of poorly controlled T2DM (A1C 11.7) who presented to ED with nausea and vomiting due to diabetic ketoacidosis.       Interval history: Feels well this AM. Hasn't vomited since last night and no complaints of nausea. No abdominal pain.       OBJECTIVE:     Vital Signs Range (Last 24H):  Temp:  [97.8 °F (36.6 °C)-98.3 °F (36.8 °C)]   Pulse:  []   Resp:  [16-22]   BP: (113-179)/(69-96)   SpO2:  [96 %-100 %] Body mass index is 25.9 kg/m².  Wt Readings from Last 1 Encounters:   05/30/17 1739 86.6 kg (191 lb)       I & O (Last 24H):  Intake/Output Summary (Last 24 hours) at 05/31/17 0827  Last data filed at 05/31/17 0420   Gross per 24 hour   Intake           979.14 ml   Output              900 ml   Net            79.14 ml       Physical Exam:  General: Alert and oriented x4, not in any distress  HENT: Conjunctivae/corneas clear. PERRL, EOMI. Oral mucosa moist and throat clear.   Neck: No adenopathy, no carotid bruit, no JVD, supple, symmetrical, trachea midline and thyroid not enlarged, symmetric, no tenderness/mass/nodules  Lungs: CTA bilaterally, normal respiratory effort  Cardiovascular: RRR, S1 and S2 Normal.  Abdomen: S/NT/ND. BS present.   Extremities: Intact distal pulses, no cyanosis or edema.   Skin: Normal color, texture and turgor. No rash.  Lymph: No cervical adenopathy  Neuro: Grossly normal.       Diagnostic Results:  Lab Results   Component Value Date    WBC 7.96 05/31/2017    HGB 13.0 (L) 05/31/2017    HCT 39.2 (L) 05/31/2017    MCV 88 05/31/2017     05/31/2017       Recent Labs  Lab 05/31/17  0244  05/31/17  0801   *  <  > 230*     < > 141   K 3.3*  < > 3.4*     < > 103   CO2 21*  < > 22*   BUN 10  < > 8   CREATININE 1.3  < > 1.3   CALCIUM 8.9  < > 9.1   MG 1.8  --   --    < > = values in this interval not displayed.  Lab Results   Component Value Date    INR 1.1 01/16/2017    INR 1.1 01/12/2017    INR 0.9 01/29/2006     Lab Results   Component Value Date    HGBA1C 15.5 (H) 05/30/2017     Recent Labs      05/31/17   0111  05/31/17   0212  05/31/17   0321  05/31/17   0416  05/31/17   0522  05/31/17   0629  05/31/17   0725  05/31/17   0832   POCTGLUCOSE  140*  115*  183*  230*  231*  173*  220*  208*       ASSESSMENT/PLAN:   47 yo AAM w/ hx of poorly controlled T2DM (A1C 15.5) and HTN, who presented to the ED with nausea and vomiting of 1 week duration. He was unable to take his insulin and metformin after the onset of symptoms, which resulted in his DKA presentation.     Diabetic Ketoacidosis associated with type 2 DM  - A1C   - continues on insulin gtt w/ D5 1/2 NS infusion  - AG 16 today  - serum glucose 230  - PCOT glu currently 208 (220, 173, 231)  - transition to subQ insulin and oral diet pending closed anion gap (<12)   - continue home basal 26    TAHIR  - on admission Cr 1.4   - Cr 1.3 today  - likely pre-renal   - urine FENA shows .9%    - however urine urea was not ordered and pt is on diuretic    HTN  - on lisinopril - hydrochlorothiazide  - currently holding  - start amlodipine     Hypokalemia:  - 3.3 on admission  - 2.9 today  - replace as need with Q4 BMP    Hyperlipidemia:  - continue lipitor    Dispo: monitor 24 hrs  Diet: NPO         Ronnie Pickering MS3

## 2017-05-31 NOTE — PLAN OF CARE
"CM to bedside - pt provided assessment info. Pt is independent w/ no DME in place except a glucometer, lives alone. Pt will likely d/c home w/ no needs.     CM provided patient anticipated FRANCISCO which will be update by nursing staff. Patient provided a Blue "My Health Packet" for d/c planning and health tool. Patient verbalized understanding.    Future Appointments  Date Time Provider Department Center   6/2/2017 10:30 AM Ramirez Wilkes DPM [facility] POD [facility]   6/9/2017 9:00 AM PHYSICIAN, PRIORITY CLINIC [facility] IMPRICL [facility]   6/29/2017 10:00 AM LAB, APPOINTMENT [facility] INTMED [facility] LAB [facility]   7/6/2017 9:40 AM [facility]        05/31/17 3971   Discharge Assessment   Assessment Type Discharge Planning Assessment   Confirmed/corrected address and phone number on facesheet? Yes   Assessment information obtained from? Patient   Expected Length of Stay (days) 3   Communicated expected length of stay with patient/caregiver yes   Prior to hospitilization cognitive status: Alert/Oriented   Prior to hospitalization functional status: Independent   Current cognitive status: Alert/Oriented   Current Functional Status: Independent   Arrived From home or self-care   Lives With alone   Able to Return to Prior Arrangements yes   Is patient able to care for self after discharge? Yes   How many people do you have in your home that can help with your care after discharge? 0   Who are your caregiver(s) and their phone number(s)? wilfred Cardenas [phone]   Patient's perception of discharge disposition home or selfcare   Readmission Within The Last 30 Days no previous admission in last 30 days   Patient currently being followed by outpatient case management? No   Patient currently receives home health services? No   Does the patient currently use HME? Yes   Name and contact number for HME provider: unknown   Patient currently receives private duty nursing? No   Patient currently receives any " other outside agency services? No   Equipment Currently Used at Home glucometer   Do you have any problems affording any of your prescribed medications? No   Is the patient taking medications as prescribed? yes   Do you have any financial concerns preventing you from receiving the healthcare you need? No   Does the patient have transportation to healthcare appointments? Yes   Transportation Available car   On Dialysis? No   Does the patient receive services at the Coumadin Clinic? No   Are there any open cases? No   Discharge Plan A Home   Discharge Plan B Home;Home Health   Patient/Family In Agreement With Plan yes

## 2017-05-31 NOTE — ED NOTES
Blood glucose 85. Insulin drip suspended at this time per protocol. Will check blood sugar again in 30 minutes.

## 2017-05-31 NOTE — ASSESSMENT & PLAN NOTE
- diarrhea and N/V may have been initially related to likely viral gastroenteritis. And with cessation of insulin use, current symptoms of N/V may be related to DKA  - IV zofran PRN and IV metoclopramide PRN for symtom control  - IVF with 1/2 NS given corrected sNa+ of 145  - conservative management , holding off abx as low level of suspicious for bacterial infection, holding off imaging given benign PE

## 2017-05-31 NOTE — SUBJECTIVE & OBJECTIVE
Past Medical History:   Diagnosis Date    Diabetes mellitus type II     Diabetic foot ulcer     Hyperlipidemia     Hypertension     Peripheral neuropathy     Subacute osteomyelitis of right foot 1/12/2017    Streptococcus agalactiae, Actinomyces odontolyticus    Ulcerative colitis, unspecified        Past Surgical History:   Procedure Laterality Date    TOE AMPUTATION Right 06/05/2015    TOE AMPUTATION Right 01/19/2017       Review of patient's allergies indicates:  No Known Allergies    No current facility-administered medications on file prior to encounter.      Current Outpatient Prescriptions on File Prior to Encounter   Medication Sig    atorvastatin (LIPITOR) 20 MG tablet Take 1 tablet (20 mg total) by mouth once daily.    blood sugar diagnostic Strp 1 strip by Misc.(Non-Drug; Combo Route) route once daily.    insulin glargine (LANTUS SOLOSTAR) 100 unit/mL (3 mL) InPn pen Inject 26 Units into the skin every evening. 35 Insulin Pen Subcutaneous daily    INV dulaglutide 0.75 mg/0.5 mL injection Inject 0.5 mLs (0.75 mg total) into the skin once a week.    lancets (ACCU-CHEK SOFTCLIX LANCETS) Misc 1 Device by Misc.(Non-Drug; Combo Route) route once daily.    lisinopril-hydrochlorothiazide (PRINZIDE,ZESTORETIC) 10-12.5 mg per tablet Take 1 tablet by mouth once daily.    metformin (GLUCOPHAGE-XR) 500 MG 24 hr tablet TAKE TWO TABLETS BY MOUTH TWICE A DAY WITH MEALS    metoclopramide HCl (REGLAN) 10 MG tablet Take 1 tablet (10 mg total) by mouth 3 (three) times daily before meals.    miconazole (MICOTIN) 2 % cream Apply topically 2 (two) times daily. Apply to left foot after thorough cleaning with soap and water. Need to dry foot completely prior to placing antifungal cream on foot.    oxycodone (ROXICODONE) 5 MG immediate release tablet Take 1 tablet (5 mg total) by mouth every 6 (six) hours as needed for Pain.     Family History     Problem Relation (Age of Onset)    Cancer Mother    Cataracts  Father    Diabetes Mother, Sister    Hypertension Mother, Father, Maternal Aunt        Social History Main Topics    Smoking status: Never Smoker    Smokeless tobacco: Not on file    Alcohol use No    Drug use: No    Sexual activity: No     Review of Systems   Constitutional: Positive for appetite change. Negative for chills and fever.   HENT: Negative for congestion, sinus pressure and sore throat.    Eyes: Negative for visual disturbance.   Respiratory: Negative for cough, shortness of breath and wheezing.    Cardiovascular: Negative for chest pain, palpitations and leg swelling.   Gastrointestinal: Positive for diarrhea and vomiting. Negative for abdominal distention, abdominal pain, blood in stool and rectal pain.   Genitourinary: Negative for difficulty urinating, dysuria, flank pain and hematuria.   Musculoskeletal: Negative for arthralgias.   Neurological: Negative for dizziness, syncope, weakness and light-headedness.   Psychiatric/Behavioral: Negative for confusion.     Objective:     Vital Signs (Most Recent):  Temp: 98.3 °F (36.8 °C) (05/30/17 1739)  Pulse: 94 (05/30/17 1951)  Resp: (!) 22 (05/30/17 1739)  BP: (!) 179/96 (05/30/17 1951)  SpO2: 100 % (05/30/17 1951) Vital Signs (24h Range):  Temp:  [98.3 °F (36.8 °C)] 98.3 °F (36.8 °C)  Pulse:  [] 94  Resp:  [22] 22  SpO2:  [100 %] 100 %  BP: (145-179)/(91-96) 179/96     Weight: 86.6 kg (191 lb)  Body mass index is 25.9 kg/m².    Physical Exam   Constitutional: He is oriented to person, place, and time. He appears well-developed and well-nourished. He appears distressed.   Actively vomiting    HENT:   Head: Normocephalic and atraumatic.   Mouth/Throat: No oropharyngeal exudate.   Eyes: EOM are normal. Pupils are equal, round, and reactive to light. No scleral icterus.   Neck: Normal range of motion. Neck supple.   Cardiovascular: Normal rate, regular rhythm, normal heart sounds and intact distal pulses.  Exam reveals no friction rub.    No  murmur heard.  Pulmonary/Chest: Effort normal and breath sounds normal. No respiratory distress. He has no wheezes. He has no rales.   Abdominal: Soft. Bowel sounds are normal. He exhibits no distension. There is no tenderness. There is no rebound and no guarding.   Musculoskeletal: Normal range of motion. He exhibits no edema or tenderness.   Lymphadenopathy:     He has no cervical adenopathy.   Neurological: He is alert and oriented to person, place, and time. No cranial nerve deficit.   Skin: Skin is warm and dry. He is not diaphoretic.        Significant Labs:   A1C:   Recent Labs  Lab 01/12/17  0228 03/29/17  0914   HGBA1C 15.0* 11.7*     CBC:   Recent Labs  Lab 05/29/17  1430 05/30/17 1822 05/30/17 1947   WBC 7.36 8.05  --    HGB 14.1 14.9  --    HCT 43.0 44.5 44    311  --      CMP:   Recent Labs  Lab 05/29/17  1430 05/30/17 1822 05/30/17 1940    140 141   K 3.4* 4.2 3.3*   CL 96 99 101   CO2 25 18* 20*   * 272* 258*   BUN 11 11 10   CREATININE 1.3 1.4 1.4   CALCIUM 9.9 9.8 9.3   PROT 7.9 8.4  --    ALBUMIN 2.9* 3.0*  --    BILITOT 0.4 0.5  --    ALKPHOS 227* 217*  --    AST 12 15  --    ALT 14 12  --    ANIONGAP 19* 23* 20*   EGFRNONAA >60.0 59.0* 59.0*     Lipase:   Recent Labs  Lab 05/30/17 1822   LIPASE 42     POCT Glucose:   Recent Labs  Lab 05/30/17  1742 05/30/17  1931   POCTGLUCOSE 250* 267*     Urine Studies: No results for input(s): COLORU, APPEARANCEUA, PHUR, SPECGRAV, PROTEINUA, GLUCUA, KETONESU, BILIRUBINUA, OCCULTUA, NITRITE, UROBILINOGEN, LEUKOCYTESUR, RBCUA, WBCUA, BACTERIA, SQUAMEPITHEL, HYALINECASTS in the last 48 hours.    Invalid input(s): WRIGHTSUR    Significant Imaging:   none

## 2017-05-31 NOTE — PLAN OF CARE
Problem: Patient Care Overview  Goal: Plan of Care Review  Outcome: Ongoing (interventions implemented as appropriate)  POC reviewed with pt, verbalized understanding. Pt AAOx4, VSS. Pt up ad daniel. Pt advanced to diabetic diet, tolerating well. IVFs and insulin drip d/c'd. Accuchecks changed to ACHS with sliding scale and scheduled insulin coverage. Pt denies n/v and pain throughout shift. Pt remains free of any falls/injury. Call light within reach. Will continue to monitor.

## 2017-05-31 NOTE — NURSING TRANSFER
Nursing Transfer Note      5/31/2017     Transfer from ED to 604    Transfer via stretcher    Transfer with pt belongings    Transported by RN    Medicines sent: Insulin Drip    Chart send with patient: yes    Patient reassessed at: 05/31/2017 @0206    Upon arrival to floor: AAOx4. VSS. Oriented to room. Call light in reach. Bed in lowest and locked position. Will continue to monitor.

## 2017-05-31 NOTE — PROGRESS NOTES
"Progress Note  Hospital Medicine                                                              Team: [facility] MED 4    Patient Name: Indio Ryder Jr.  YOB: 1970    Admit Date: 5/30/2017                     LOS: 1    SUBJECTIVE:     Reason for Admission:  Diabetic ketoacidosis without coma associated with type 2 diabetes mellitus  45 y/o AAM with hx of T2DM (poorly  controlled, with long term insulin use, A1C of 11) and HTN presents to the ED with diarrhea that started 1 week ago. Following development of diarrhea, patient experienced intractable nausea and vomiting, non-bloody, non- bilious, with vomiting episodes occurring several timer per hour. Diarrhea has since resolved, however, n/v has persisted. Patient stopped taking his insulin (26u lantus QHS) as well as metformin at the onset of n/v.In the ED, patient was noted to be HDS. Glucose of 272, bicarb of 18, AG Of 23, BHB of 3.7. VBG with pH of 7.36 and PO2 of 38. Cr of 1.4, corrected sodium ~143-145. No WBC elevation. He was actively vomiting in the ED. Received 2 L NS. He was started on an insulin gtt at this time and BMPs were followed q 4 hours.    Interval history:   Feels "ok" this morning; no vomiting since last evening. Denies abdominal pain. Requesting to eat and drink  this morning     OBJECTIVE:     Vital Signs Range (Last 24H):  Temp:  [97.8 °F (36.6 °C)-98.3 °F (36.8 °C)]   Pulse:  []   Resp:  [16-22]   BP: (113-179)/(69-96)   SpO2:  [96 %-100 %] Body mass index is 25.9 kg/m².  Wt Readings from Last 1 Encounters:   05/30/17 1739 86.6 kg (191 lb)       I & O (Last 24H):  Intake/Output Summary (Last 24 hours) at 05/31/17 0741  Last data filed at 05/31/17 0420   Gross per 24 hour   Intake           979.14 ml   Output              900 ml   Net            79.14 ml     Physical Exam:  General: alert, oriented and appears stated age  HENT: NC/AT.Conjunctivae/corneas clear. PERRL, EOMI. Nares normal. Septum midline. Mucosa normal. " No drainage or sinus tenderness.  Neck: no adenopathy, no carotid bruit, no JVD, supple, symmetrical, trachea midline and thyroid not enlarged, symmetric, no tenderness/mass/nodules  Back: symmetric, no curvature. ROM normal. No CVA tenderness.  Lungs: clear to auscultation bilaterally, respiratory effort normal  CV: RRR, S1 and S2 Normal. No chest wall tenderness  Abdomen: S, NT, ND. Bowel sounds normal   Extremities: WWP, intact distal pulses. no cyanosis or edema  Skin: Skin color, texture, turgor normal. No rashes or lesions  Lymph nodes: Cervical, supraclavicular, and axillary nodes normal.  Neurologic: Grossly normal      Diagnostic Results:  Lab Results   Component Value Date    WBC 7.96 05/31/2017    HGB 13.0 (L) 05/31/2017    HCT 39.2 (L) 05/31/2017    MCV 88 05/31/2017     05/31/2017       Recent Labs  Lab 05/31/17  0244 05/31/17  0549   * 213*    141   K 3.3* 3.9    104   CO2 21* 23   BUN 10 9   CREATININE 1.3 1.3   CALCIUM 8.9 9.0   MG 1.8  --      Lab Results   Component Value Date    INR 1.1 01/16/2017    INR 1.1 01/12/2017    INR 0.9 01/29/2006     Lab Results   Component Value Date    HGBA1C 11.7 (H) 03/29/2017     Recent Labs      05/31/17   0010  05/31/17   0111  05/31/17   0212  05/31/17   0321  05/31/17   0416  05/31/17   0522  05/31/17   0629  05/31/17   0725   POCTGLUCOSE  107  140*  115*  183*  230*  231*  173*  220*       ASSESSMENT/PLAN:   47 y/o AAM with hx of T2DM (poorly  controlled, with long term insulin use, A1C of 11) and HTN presents to the ED with diarrhea that started 1 week ago with associated N/V, who stopped taking insulin and metformin on sx onset, and presented to the ED in DKA, started on insulin gtt     Diabetic Ketoacidosis without coma associated with type 2 DM  - continues on insulin gtt with D5 1/2 NS infusion this morning  - BMP this AM with closed AG at 14, glucose range   - will continue on gtt this AM pending additional BMP  demonstrating closed AG and then transition to subQ   - home regiment is 26 basal   - A1c 15.5; will need adjustment of insulin regiment moving forward   - replace K as needed     Nausea and Vomiting  - currently resolved  - patient feeling much better this AM  - continue sx support  - reglan prn, seen by pcp 5/29 and started on this med recently, hold off on scheduled for now    TAHIR  - on admission Cr 1.4, baseline 1  - urine lytes in process, most likely pre-renal given hx poor PO intake and GI losses  - improving with IVF  - FeNa 1, consistent with pre-renal, but in setting of HCTZ use will try to add on FeUrea     HTN  - on lisinopril-HCTZ combo  - hold for now in setting of TAHIR  - amlodipine 5 for now    Hypokalemia  - replace as needed, BMP q 4    Hyperlipidemia  - continue statin     PPx: lovenox  Diet: NPO   Dispo  - continue gtt this morning and transition pending additional BMP demonstrating closed AG  - begin diet once transitioned  - home with no needs likely pending resolution    Mary Vargas MD  [id number]        -

## 2017-05-31 NOTE — ASSESSMENT & PLAN NOTE
- DKA given AGMA with AG of 23, BHB of 3.7, gluc of 272, as well as cessation of home insulin use  - is likely 2/2 gastroenteritis   - insulin gtt  - BMP Q 4 hrs to replete K+  - POCT glucose Q1 hr  - start D5 1/2 NS with 20mEq of KCl when POCT glucose is ~200  - NPO

## 2017-05-31 NOTE — H&P
[facility] Hospital Medicine  History & Physical    Patient Name: Indio Ryder Jr.  MRN: [id number]  Admission Date: 5/30/2017  Attending Physician: Fadi Birmingham MD   Primary Care Provider: Lorena Thakur MD    [facility] Medicine Team: [facility] HOSP MED 4 Mercedez Medina MD     Patient information was obtained from patient, past medical records and ER records.     Subjective:     Principal Problem:Diabetic ketoacidosis without coma associated with type 2 diabetes mellitus    Chief Complaint:   Chief Complaint   Patient presents with    Emesis     vomiting and diarrhea for over 1 week diabetic not taking insulin, not checking sugars, crying 'i feel bad,         HPI: 47 y/o AAM with hx of T2DM (poorly  controlled, with long term insulin use, A1C of 11) and HTN presents to the ED with diarrhea that started 1 week ago. Following development of diarrhea, patient experienced intractable nausea and vomiting, non-bloody, non- bilious, with vomiting episodes occurring several timer per hour. Diarrhea has since resolved, however, n/v has persisted. Patient stopped taking his insulin (26u lantus QHS) as well as metformin at the onset of n/v. He attempted to stay hydrated but vomited any liquids; has not been tolerating other PO intake. Other associated symptoms include GERD from vomiting. He denies any abdominal pain, prior abd surgeries, fevers or chills, sick contacts, unusual foods, recent antibiotic use, travel, pets, or prior similar episodes. Denies any hx of pancreatitis or gastroparesis.     In the ED, patient was noted to be HDS. Glucose of 272, bicarb of 18, AG Of 23, BHB of 3.7. VBG with pH of 7.36 and PO2 of 38. Cr of 1.4, corrected sodium ~143-145. No WBC elevation. He was actively vomiting in the ED. Received 2 L NS.     Past Medical History:   Diagnosis Date    Diabetes mellitus type II     Diabetic foot ulcer     Hyperlipidemia     Hypertension     Peripheral neuropathy      Subacute osteomyelitis of right foot 1/12/2017    Streptococcus agalactiae, Actinomyces odontolyticus    Ulcerative colitis, unspecified        Past Surgical History:   Procedure Laterality Date    TOE AMPUTATION Right 06/05/2015    TOE AMPUTATION Right 01/19/2017       Review of patient's allergies indicates:  No Known Allergies    No current facility-administered medications on file prior to encounter.      Current Outpatient Prescriptions on File Prior to Encounter   Medication Sig    atorvastatin (LIPITOR) 20 MG tablet Take 1 tablet (20 mg total) by mouth once daily.    blood sugar diagnostic Strp 1 strip by Misc.(Non-Drug; Combo Route) route once daily.    insulin glargine (LANTUS SOLOSTAR) 100 unit/mL (3 mL) InPn pen Inject 26 Units into the skin every evening. 35 Insulin Pen Subcutaneous daily    INV dulaglutide 0.75 mg/0.5 mL injection Inject 0.5 mLs (0.75 mg total) into the skin once a week.    lancets (ACCU-CHEK SOFTCLIX LANCETS) Misc 1 Device by Misc.(Non-Drug; Combo Route) route once daily.    lisinopril-hydrochlorothiazide (PRINZIDE,ZESTORETIC) 10-12.5 mg per tablet Take 1 tablet by mouth once daily.    metformin (GLUCOPHAGE-XR) 500 MG 24 hr tablet TAKE TWO TABLETS BY MOUTH TWICE A DAY WITH MEALS    metoclopramide HCl (REGLAN) 10 MG tablet Take 1 tablet (10 mg total) by mouth 3 (three) times daily before meals.    miconazole (MICOTIN) 2 % cream Apply topically 2 (two) times daily. Apply to left foot after thorough cleaning with soap and water. Need to dry foot completely prior to placing antifungal cream on foot.    oxycodone (ROXICODONE) 5 MG immediate release tablet Take 1 tablet (5 mg total) by mouth every 6 (six) hours as needed for Pain.     Family History     Problem Relation (Age of Onset)    Cancer Mother    Cataracts Father    Diabetes Mother, Sister    Hypertension Mother, Father, Maternal Aunt        Social History Main Topics    Smoking status: Never Smoker    Smokeless  tobacco: Not on file    Alcohol use No    Drug use: No    Sexual activity: No     Review of Systems   Constitutional: Positive for appetite change. Negative for chills and fever.   HENT: Negative for congestion, sinus pressure and sore throat.    Eyes: Negative for visual disturbance.   Respiratory: Negative for cough, shortness of breath and wheezing.    Cardiovascular: Negative for chest pain, palpitations and leg swelling.   Gastrointestinal: Positive for diarrhea and vomiting. Negative for abdominal distention, abdominal pain, blood in stool and rectal pain.   Genitourinary: Negative for difficulty urinating, dysuria, flank pain and hematuria.   Musculoskeletal: Negative for arthralgias.   Neurological: Negative for dizziness, syncope, weakness and light-headedness.   Psychiatric/Behavioral: Negative for confusion.     Objective:     Vital Signs (Most Recent):  Temp: 98.3 °F (36.8 °C) (05/30/17 1739)  Pulse: 94 (05/30/17 1951)  Resp: (!) 22 (05/30/17 1739)  BP: (!) 179/96 (05/30/17 1951)  SpO2: 100 % (05/30/17 1951) Vital Signs (24h Range):  Temp:  [98.3 °F (36.8 °C)] 98.3 °F (36.8 °C)  Pulse:  [] 94  Resp:  [22] 22  SpO2:  [100 %] 100 %  BP: (145-179)/(91-96) 179/96     Weight: 86.6 kg (191 lb)  Body mass index is 25.9 kg/m².    Physical Exam   Constitutional: He is oriented to person, place, and time. He appears well-developed and well-nourished. He appears distressed.   Actively vomiting    HENT:   Head: Normocephalic and atraumatic.   Mouth/Throat: No oropharyngeal exudate.   Eyes: EOM are normal. Pupils are equal, round, and reactive to light. No scleral icterus.   Neck: Normal range of motion. Neck supple.   Cardiovascular: Normal rate, regular rhythm, normal heart sounds and intact distal pulses.  Exam reveals no friction rub.    No murmur heard.  Pulmonary/Chest: Effort normal and breath sounds normal. No respiratory distress. He has no wheezes. He has no rales.   Abdominal: Soft. Bowel sounds  are normal. He exhibits no distension. There is no tenderness. There is no rebound and no guarding.   Musculoskeletal: Normal range of motion. He exhibits no edema or tenderness.   Lymphadenopathy:     He has no cervical adenopathy.   Neurological: He is alert and oriented to person, place, and time. No cranial nerve deficit.   Skin: Skin is warm and dry. He is not diaphoretic.        Significant Labs:   A1C:   Recent Labs  Lab 01/12/17  0228 03/29/17  0914   HGBA1C 15.0* 11.7*     CBC:   Recent Labs  Lab 05/29/17  1430 05/30/17 1822 05/30/17 1947   WBC 7.36 8.05  --    HGB 14.1 14.9  --    HCT 43.0 44.5 44    311  --      CMP:   Recent Labs  Lab 05/29/17 1430 05/30/17 1822 05/30/17 1940    140 141   K 3.4* 4.2 3.3*   CL 96 99 101   CO2 25 18* 20*   * 272* 258*   BUN 11 11 10   CREATININE 1.3 1.4 1.4   CALCIUM 9.9 9.8 9.3   PROT 7.9 8.4  --    ALBUMIN 2.9* 3.0*  --    BILITOT 0.4 0.5  --    ALKPHOS 227* 217*  --    AST 12 15  --    ALT 14 12  --    ANIONGAP 19* 23* 20*   EGFRNONAA >60.0 59.0* 59.0*     Lipase:   Recent Labs  Lab 05/30/17 1822   LIPASE 42     POCT Glucose:   Recent Labs  Lab 05/30/17 1742 05/30/17  1931   POCTGLUCOSE 250* 267*     Urine Studies: No results for input(s): COLORU, APPEARANCEUA, PHUR, SPECGRAV, PROTEINUA, GLUCUA, KETONESU, BILIRUBINUA, OCCULTUA, NITRITE, UROBILINOGEN, LEUKOCYTESUR, RBCUA, WBCUA, BACTERIA, SQUAMEPITHEL, HYALINECASTS in the last 48 hours.    Invalid input(s): WRIGHTSUR    Significant Imaging:   none    Assessment/Plan:     * Diabetic ketoacidosis without coma associated with type 2 diabetes mellitus    - DKA given AGMA with AG of 23, BHB of 3.7, gluc of 272, as well as cessation of home insulin use  - is likely 2/2 gastroenteritis   - insulin gtt  - BMP Q 4 hrs to replete K+  - POCT glucose Q1 hr  - start D5 1/2 NS with 20mEq of KCl when POCT glucose is ~200  - NPO          Intractable vomiting with nausea    - diarrhea and N/V may have been  initially related to likely viral gastroenteritis. And with cessation of insulin use, current symptoms of N/V may be related to DKA  - IV zofran PRN and IV metoclopramide PRN for symtom control  - IVF with 1/2 NS given corrected sNa+ of 145  - conservative management , holding off abx as low level of suspicious for bacterial infection, holding off imaging given benign PE          TAHIR (acute kidney injury)    - Cr of 1.4, from baseline of 1; pt may be underlying CKD given uncontrolled DM  - likely pre-renal given vomiting and volume depletion  - check urine lytes  - IVF as above            Increased anion gap metabolic acidosis    - 2/2 DKA, as above          Hypokalemia    - 2/2 vomiting and insulin use  - replete with IV            VTE Risk Mitigation         Ordered     enoxaparin injection 40 mg  Daily     Route:  Subcutaneous        05/30/17 2036     Medium Risk of VTE  Once      05/30/17 2036        Mercedez Medina MD  Department of Hospital Medicine   [facility]  Internal Medicine PGY-2  Pager [phone]

## 2017-05-31 NOTE — ASSESSMENT & PLAN NOTE
- Cr of 1.4, from baseline of 1; pt may be underlying CKD given uncontrolled DM  - likely pre-renal given vomiting and volume depletion  - check urine lytes  - IVF as above

## 2017-05-31 NOTE — HPI
47 y/o AAM with hx of T2DM (poorly  controlled, with long term insulin use, A1C of 11) and HTN presents to the ED with diarrhea that started 1 week ago. Following development of diarrhea, patient experienced intractable nausea and vomiting, non-bloody, non- bilious, with vomiting episodes occurring several timer per hour. Diarrhea has since resolved, however, n/v has persisted. Patient stopped taking his insulin (26u lantus QHS) as well as metformin at the onset of n/v. He attempted to stay hydrated but vomited any liquids; has not been tolerating other PO intake. Other associated symptoms include GERD from vomiting. He denies any abdominal pain, prior abd surgeries, fevers or chills, sick contacts, unusual foods, recent antibiotic use, travel, pets, or prior similar episodes. Denies any hx of pancreatitis or gastroparesis.     In the ED, patient was noted to be HDS. Glucose of 272, bicarb of 18, AG Of 23, BHB of 3.7. VBG with pH of 7.36 and PO2 of 38. Cr of 1.4, corrected sodium ~143-145. No WBC elevation. He was actively vomiting in the ED. Received 2 L NS.

## 2017-06-01 ENCOUNTER — TELEPHONE (OUTPATIENT)
Dept: INTERNAL MEDICINE | Facility: CLINIC | Age: 47
End: 2017-06-01

## 2017-06-01 LAB
ANION GAP SERPL CALC-SCNC: 10 MMOL/L
ANION GAP SERPL CALC-SCNC: 12 MMOL/L
ANION GAP SERPL CALC-SCNC: 12 MMOL/L
ANION GAP SERPL CALC-SCNC: 13 MMOL/L
BUN SERPL-MCNC: 6 MG/DL
CALCIUM SERPL-MCNC: 9.1 MG/DL
CALCIUM SERPL-MCNC: 9.3 MG/DL
CALCIUM SERPL-MCNC: 9.4 MG/DL
CALCIUM SERPL-MCNC: 9.6 MG/DL
CHLORIDE SERPL-SCNC: 100 MMOL/L
CHLORIDE SERPL-SCNC: 102 MMOL/L
CHLORIDE SERPL-SCNC: 103 MMOL/L
CHLORIDE SERPL-SCNC: 104 MMOL/L
CO2 SERPL-SCNC: 25 MMOL/L
CO2 SERPL-SCNC: 25 MMOL/L
CO2 SERPL-SCNC: 26 MMOL/L
CO2 SERPL-SCNC: 28 MMOL/L
CREAT SERPL-MCNC: 1.1 MG/DL
CREAT SERPL-MCNC: 1.2 MG/DL
EST. GFR  (AFRICAN AMERICAN): >60 ML/MIN/1.73 M^2
EST. GFR  (NON AFRICAN AMERICAN): >60 ML/MIN/1.73 M^2
GLUCOSE SERPL-MCNC: 132 MG/DL
GLUCOSE SERPL-MCNC: 175 MG/DL
GLUCOSE SERPL-MCNC: 191 MG/DL
GLUCOSE SERPL-MCNC: 210 MG/DL
LIPASE SERPL-CCNC: 60 U/L
POCT GLUCOSE: 113 MG/DL (ref 70–110)
POCT GLUCOSE: 156 MG/DL (ref 70–110)
POCT GLUCOSE: 185 MG/DL (ref 70–110)
POCT GLUCOSE: 75 MG/DL (ref 70–110)
POTASSIUM SERPL-SCNC: 3.3 MMOL/L
POTASSIUM SERPL-SCNC: 3.3 MMOL/L
POTASSIUM SERPL-SCNC: 3.7 MMOL/L
POTASSIUM SERPL-SCNC: 4.3 MMOL/L
SODIUM SERPL-SCNC: 138 MMOL/L
SODIUM SERPL-SCNC: 140 MMOL/L
SODIUM SERPL-SCNC: 141 MMOL/L
SODIUM SERPL-SCNC: 141 MMOL/L

## 2017-06-01 PROCEDURE — 25000003 PHARM REV CODE 250: Performed by: ANESTHESIOLOGY

## 2017-06-01 PROCEDURE — 83690 ASSAY OF LIPASE: CPT

## 2017-06-01 PROCEDURE — 80048 BASIC METABOLIC PNL TOTAL CA: CPT

## 2017-06-01 PROCEDURE — 63600175 PHARM REV CODE 636 W HCPCS: Performed by: HOSPITALIST

## 2017-06-01 PROCEDURE — 36415 COLL VENOUS BLD VENIPUNCTURE: CPT

## 2017-06-01 PROCEDURE — 20600001 HC STEP DOWN PRIVATE ROOM

## 2017-06-01 PROCEDURE — 25000003 PHARM REV CODE 250: Performed by: STUDENT IN AN ORGANIZED HEALTH CARE EDUCATION/TRAINING PROGRAM

## 2017-06-01 PROCEDURE — 99232 SBSQ HOSP IP/OBS MODERATE 35: CPT | Mod: ,,, | Performed by: HOSPITALIST

## 2017-06-01 PROCEDURE — 25000003 PHARM REV CODE 250: Performed by: INTERNAL MEDICINE

## 2017-06-01 PROCEDURE — C9113 INJ PANTOPRAZOLE SODIUM, VIA: HCPCS | Performed by: HOSPITALIST

## 2017-06-01 PROCEDURE — 80048 BASIC METABOLIC PNL TOTAL CA: CPT | Mod: 91

## 2017-06-01 RX ORDER — INSULIN ASPART 100 [IU]/ML
3 INJECTION, SOLUTION INTRAVENOUS; SUBCUTANEOUS
Status: DISCONTINUED | OUTPATIENT
Start: 2017-06-01 | End: 2017-06-03 | Stop reason: HOSPADM

## 2017-06-01 RX ORDER — POTASSIUM CHLORIDE 750 MG/1
20 CAPSULE, EXTENDED RELEASE ORAL 2 TIMES DAILY
Status: DISCONTINUED | OUTPATIENT
Start: 2017-06-01 | End: 2017-06-01

## 2017-06-01 RX ORDER — POTASSIUM CHLORIDE 20 MEQ/15ML
60 SOLUTION ORAL ONCE
Status: COMPLETED | OUTPATIENT
Start: 2017-06-01 | End: 2017-06-01

## 2017-06-01 RX ORDER — SIMETHICONE 80 MG
1 TABLET,CHEWABLE ORAL 3 TIMES DAILY PRN
Status: DISCONTINUED | OUTPATIENT
Start: 2017-06-01 | End: 2017-06-03 | Stop reason: HOSPADM

## 2017-06-01 RX ADMIN — AMLODIPINE BESYLATE 5 MG: 5 TABLET ORAL at 08:06

## 2017-06-01 RX ADMIN — CALCIUM CARBONATE (ANTACID) CHEW TAB 500 MG 500 MG: 500 CHEW TAB at 07:06

## 2017-06-01 RX ADMIN — ATORVASTATIN CALCIUM 20 MG: 20 TABLET, FILM COATED ORAL at 08:06

## 2017-06-01 RX ADMIN — INSULIN ASPART 3 UNITS: 100 INJECTION, SOLUTION INTRAVENOUS; SUBCUTANEOUS at 04:06

## 2017-06-01 RX ADMIN — CALCIUM CARBONATE (ANTACID) CHEW TAB 500 MG 500 MG: 500 CHEW TAB at 02:06

## 2017-06-01 RX ADMIN — INSULIN ASPART 5 UNITS: 100 INJECTION, SOLUTION INTRAVENOUS; SUBCUTANEOUS at 09:06

## 2017-06-01 RX ADMIN — SIMETHICONE CHEW TAB 80 MG 80 MG: 80 TABLET ORAL at 07:06

## 2017-06-01 RX ADMIN — ENOXAPARIN SODIUM 40 MG: 100 INJECTION SUBCUTANEOUS at 04:06

## 2017-06-01 RX ADMIN — POTASSIUM CHLORIDE 60 MEQ: 20 SOLUTION ORAL at 02:06

## 2017-06-01 RX ADMIN — POTASSIUM CHLORIDE 20 MEQ: 750 CAPSULE, EXTENDED RELEASE ORAL at 08:06

## 2017-06-01 RX ADMIN — PANTOPRAZOLE SODIUM 40 MG: 40 INJECTION, POWDER, FOR SOLUTION INTRAVENOUS at 08:06

## 2017-06-01 NOTE — TELEPHONE ENCOUNTER
----- Message from Bekah Pal LPN sent at 5/30/2017  9:25 AM CDT -----  Contact: Ashwini santillan/[facility] - Ext # [phone]       ----- Message -----  From: Chela Dean MA  Sent: 5/30/2017   8:39 AM  To: [facility] Staff    Think the Rx for INV dulaglutide 0.75 mg/0.5 mL injection was sent to the wrong pharmacy.    INV is for the investigational meds and may have been sent to them by mistake. Please call. Thanks!

## 2017-06-01 NOTE — PLAN OF CARE
Problem: Patient Care Overview  Goal: Plan of Care Review  Outcome: Ongoing (interventions implemented as appropriate)  POC reviewed with pt, pt verbalized understanding. VSS. Pt AAOx3. Pt c/o pain during shift,  relieved with prescribed meds. Pt denies nausea during shift.  Pt  Not tolerating 2000 ADA diet. Pt moving independently in bed.  Pt remains free of falls, injuries and trauma during shift. No distress noted at this time, will continue to monitor.

## 2017-06-01 NOTE — PLAN OF CARE
Problem: Patient Care Overview  Goal: Plan of Care Review  Outcome: Ongoing (interventions implemented as appropriate)  Plan of care reviewed with pt. Pt AAO x's 4. Vital signs stable. Complains of slight uppergastric discomfort, which is relieved by anti-acids. Blood sugar within normal range. Tolerating diabetic diet well. Pt ambulates independently and remains free from falls. No acute events at this time. Bed in low and locked position, call light in place. TM

## 2017-06-02 LAB
ANION GAP SERPL CALC-SCNC: 13 MMOL/L
ANION GAP SERPL CALC-SCNC: 8 MMOL/L
BUN SERPL-MCNC: 6 MG/DL
BUN SERPL-MCNC: 6 MG/DL
CALCIUM SERPL-MCNC: 9.3 MG/DL
CALCIUM SERPL-MCNC: 9.6 MG/DL
CHLORIDE SERPL-SCNC: 100 MMOL/L
CHLORIDE SERPL-SCNC: 100 MMOL/L
CO2 SERPL-SCNC: 26 MMOL/L
CO2 SERPL-SCNC: 31 MMOL/L
CREAT SERPL-MCNC: 1 MG/DL
CREAT SERPL-MCNC: 1.2 MG/DL
EST. GFR  (AFRICAN AMERICAN): >60 ML/MIN/1.73 M^2
EST. GFR  (AFRICAN AMERICAN): >60 ML/MIN/1.73 M^2
EST. GFR  (NON AFRICAN AMERICAN): >60 ML/MIN/1.73 M^2
EST. GFR  (NON AFRICAN AMERICAN): >60 ML/MIN/1.73 M^2
GLUCOSE SERPL-MCNC: 158 MG/DL
GLUCOSE SERPL-MCNC: 176 MG/DL
POCT GLUCOSE: 156 MG/DL (ref 70–110)
POCT GLUCOSE: 187 MG/DL (ref 70–110)
POTASSIUM SERPL-SCNC: 3.4 MMOL/L
POTASSIUM SERPL-SCNC: 3.8 MMOL/L
SODIUM SERPL-SCNC: 139 MMOL/L
SODIUM SERPL-SCNC: 139 MMOL/L

## 2017-06-02 PROCEDURE — 11000001 HC ACUTE MED/SURG PRIVATE ROOM

## 2017-06-02 PROCEDURE — C9113 INJ PANTOPRAZOLE SODIUM, VIA: HCPCS | Performed by: HOSPITALIST

## 2017-06-02 PROCEDURE — 25000003 PHARM REV CODE 250: Performed by: STUDENT IN AN ORGANIZED HEALTH CARE EDUCATION/TRAINING PROGRAM

## 2017-06-02 PROCEDURE — 51798 US URINE CAPACITY MEASURE: CPT

## 2017-06-02 PROCEDURE — 99233 SBSQ HOSP IP/OBS HIGH 50: CPT | Mod: ,,, | Performed by: HOSPITALIST

## 2017-06-02 PROCEDURE — 63600175 PHARM REV CODE 636 W HCPCS: Performed by: HOSPITALIST

## 2017-06-02 PROCEDURE — 36415 COLL VENOUS BLD VENIPUNCTURE: CPT

## 2017-06-02 PROCEDURE — 80048 BASIC METABOLIC PNL TOTAL CA: CPT | Mod: 91

## 2017-06-02 PROCEDURE — 25000003 PHARM REV CODE 250: Performed by: ANESTHESIOLOGY

## 2017-06-02 RX ORDER — PEN NEEDLE, DIABETIC 29 G X1/2"
NEEDLE, DISPOSABLE MISCELLANEOUS
Qty: 90 EACH | Refills: 1 | Status: SHIPPED | OUTPATIENT
Start: 2017-06-02 | End: 2017-06-03

## 2017-06-02 RX ORDER — ACETAMINOPHEN 325 MG/1
650 TABLET ORAL EVERY 6 HOURS PRN
Status: DISCONTINUED | OUTPATIENT
Start: 2017-06-02 | End: 2017-06-03 | Stop reason: HOSPADM

## 2017-06-02 RX ORDER — LISINOPRIL 10 MG/1
10 TABLET ORAL DAILY
Qty: 30 TABLET | Refills: 1 | Status: SHIPPED | OUTPATIENT
Start: 2017-06-02 | End: 2017-06-03

## 2017-06-02 RX ORDER — DICYCLOMINE HYDROCHLORIDE 10 MG/1
10 CAPSULE ORAL 4 TIMES DAILY PRN
Status: DISCONTINUED | OUTPATIENT
Start: 2017-06-02 | End: 2017-06-03 | Stop reason: HOSPADM

## 2017-06-02 RX ORDER — POTASSIUM CHLORIDE 20 MEQ/15ML
60 SOLUTION ORAL ONCE
Status: COMPLETED | OUTPATIENT
Start: 2017-06-02 | End: 2017-06-02

## 2017-06-02 RX ORDER — INSULIN ASPART 100 [IU]/ML
3 INJECTION, SOLUTION INTRAVENOUS; SUBCUTANEOUS 3 TIMES DAILY
Qty: 2.7 ML | Refills: 1 | Status: SHIPPED | OUTPATIENT
Start: 2017-06-02 | End: 2017-06-03

## 2017-06-02 RX ORDER — RAMELTEON 8 MG/1
8 TABLET ORAL NIGHTLY PRN
Status: DISCONTINUED | OUTPATIENT
Start: 2017-06-02 | End: 2017-06-03 | Stop reason: HOSPADM

## 2017-06-02 RX ORDER — AMLODIPINE BESYLATE 5 MG/1
5 TABLET ORAL DAILY
Qty: 30 TABLET | Refills: 1 | Status: SHIPPED | OUTPATIENT
Start: 2017-06-02 | End: 2017-06-03

## 2017-06-02 RX ORDER — METOCLOPRAMIDE 10 MG/1
10 TABLET ORAL 3 TIMES DAILY
Status: DISCONTINUED | OUTPATIENT
Start: 2017-06-02 | End: 2017-06-03 | Stop reason: HOSPADM

## 2017-06-02 RX ORDER — LISINOPRIL 10 MG/1
10 TABLET ORAL DAILY
Status: DISCONTINUED | OUTPATIENT
Start: 2017-06-02 | End: 2017-06-03 | Stop reason: HOSPADM

## 2017-06-02 RX ORDER — METOCLOPRAMIDE 10 MG/1
10 TABLET ORAL
Status: DISCONTINUED | OUTPATIENT
Start: 2017-06-02 | End: 2017-06-02

## 2017-06-02 RX ADMIN — ACETAMINOPHEN 650 MG: 325 TABLET ORAL at 10:06

## 2017-06-02 RX ADMIN — INSULIN ASPART 3 UNITS: 100 INJECTION, SOLUTION INTRAVENOUS; SUBCUTANEOUS at 07:06

## 2017-06-02 RX ADMIN — METOCLOPRAMIDE 10 MG: 10 TABLET ORAL at 10:06

## 2017-06-02 RX ADMIN — AMLODIPINE BESYLATE 5 MG: 5 TABLET ORAL at 08:06

## 2017-06-02 RX ADMIN — ATORVASTATIN CALCIUM 20 MG: 20 TABLET, FILM COATED ORAL at 08:06

## 2017-06-02 RX ADMIN — INSULIN ASPART 3 UNITS: 100 INJECTION, SOLUTION INTRAVENOUS; SUBCUTANEOUS at 08:06

## 2017-06-02 RX ADMIN — DICYCLOMINE HYDROCHLORIDE 10 MG: 10 CAPSULE ORAL at 10:06

## 2017-06-02 RX ADMIN — PANTOPRAZOLE SODIUM 40 MG: 40 INJECTION, POWDER, FOR SOLUTION INTRAVENOUS at 08:06

## 2017-06-02 RX ADMIN — LISINOPRIL 10 MG: 10 TABLET ORAL at 01:06

## 2017-06-02 RX ADMIN — ONDANSETRON 8 MG: 2 INJECTION INTRAMUSCULAR; INTRAVENOUS at 11:06

## 2017-06-02 RX ADMIN — ALUMINUM HYDROXIDE, MAGNESIUM HYDROXIDE, AND SIMETHICONE 50 ML: 200; 200; 20 SUSPENSION ORAL at 10:06

## 2017-06-02 RX ADMIN — POTASSIUM CHLORIDE 60 MEQ: 20 SOLUTION ORAL at 09:06

## 2017-06-02 RX ADMIN — ENOXAPARIN SODIUM 40 MG: 100 INJECTION SUBCUTANEOUS at 05:06

## 2017-06-02 RX ADMIN — METOCLOPRAMIDE 10 MG: 10 TABLET ORAL at 01:06

## 2017-06-02 NOTE — PLAN OF CARE
Problem: Patient Care Overview  Goal: Plan of Care Review  Outcome: Ongoing (interventions implemented as appropriate)  Plan of care reviewed with patient who verbalized understanding. Pt ambulates independently and denies pain or discomfort. PO intake must be encouraged without IVF, and somewhat low urine output.

## 2017-06-02 NOTE — DISCHARGE SUMMARY
DISCHARGE SUMMARY  Hospital Medicine    Team: [facility] HOSP MED 4    Patient Name: Indio Ryder Jr.  YOB: 1970    Admit Date: 5/30/2017    Discharge Date: 06/03/2017    Discharge Attending Physician: Fadi Birmingham MD     Diagnoses:  Active Hospital Problems    Diagnosis  POA    *Diabetic ketoacidosis without coma associated with type 2 diabetes mellitus [E13.10]  Yes    Intractable vomiting with nausea [R11.2]  Yes    Increased anion gap metabolic acidosis [E87.2]  Yes    Hypokalemia [E87.6]  Yes    TAHIR (acute kidney injury) [N17.9]  Yes    Hyperlipidemia [E78.5]  Yes     Chronic    Essential hypertension [I10]  Yes     Chronic      Resolved Hospital Problems    Diagnosis Date Resolved POA   No resolved problems to display.       Discharged Condition: admit problems have stabilized      HOSPITAL COURSE:    Initial Presentation:   45 y/o AAM with hx of T2DM (poorly  controlled, with long term insulin use, A1C of 11) and HTN presented to the ED 5/30 with diarrhea that started 1 week ago. Following development of diarrhea, patient experienced intractable nausea and vomiting and patient reportedly stopped taking his insulin (26u lantus QHS) and  Metformin. On arrival to ED, patient was noted to be in HDS/DKA with  Glucose of 272, bicarb of 18, AG Of 23, BHB of 3.7. VBG with pH of 7.36 and PO2 of 38. Cr of 1.4, corrected sodium ~143-145.He was started on an insulin gtt at this time and BMPs were followed q 4 hours.    Course of Principle Problem for Admission:    Diabetic Ketoacidosis without coma associated with type 2 DM  - Resolved   - Patient with elevated AG and BHB on admission. Started on insulin gtt. Gap closed 5/31 and transitioned off insulin gtt.   - BG range last 24 hrs   - continue regiment of 26 basal and 3 units TIDWM at discharge  - [facility] care clinic appt 6/9 scheduled for further adjustments     Other Medical Problems Addressed in the Hospital:  Nausea and Vomiting  -  had episodes of nausea and vomiting yesterday; KUB unremarkable and resolved after starting reglan  - continue reglan with meals      TAHIR  - resolved   - on admission Cr 1.4, baseline ~1, now 1.1  -most likely pre-renal given hx poor PO intake and GI losses and improved somewhat with IVFs but may also be new baseline     HTN  - on lisinopril-HCTZ combo on admit  - resume ace at 10 mg, in setting of added amlodipine with hold HCTZ component   - amlodipine 5, continue at discharge      Hypokalemia  - replace as needed     Hyperlipidemia  - continue statin       CONSULTS: none     Other Pertinent Lab Findings: BMP 6/2 @6a  Sodium 136 - 145 mmol/L 139   Potassium 3.5 - 5.1 mmol/L 3.4    Chloride 95 - 110 mmol/L 100   CO2 23 - 29 mmol/L 31    Glucose 70 - 110 mg/dL 158    BUN, Bld 6 - 20 mg/dL 6   Creatinine 0.5 - 1.4 mg/dL 1.2   Calcium 8.7 - 10.5 mg/dL 9.6   Anion Gap 8 - 16 mmol/L 8   eGFR if African American >60 mL/min/1.73 m^2 >60.0       Special Treatments/Procedures: insulin gtt    Disposition:  Home       Future Scheduled Appointments:  Future Appointments  Date Time Provider Department Center   6/9/2017 9:00 AM PHYSICIAN, [facility] Agustin Melgo[facility] PC[facility]   6/29/2017 10:00 AM LAB, APPOINTMENT [facility] LAB  Agustin Melgoza PCW   7/6/2017 9:40 AM Lorena Thakur MD [facility] Agustin Melgoza [facility]       Follow-up Plans from This Hospitalization:  As above    Last CBC/BMP/HgbA1c (if applicable):  Recent Results (from the past 336 hour(s))   CBC with Automated Differential    Collection Time: 05/31/17  5:49 AM   Result Value Ref Range    WBC 7.96 3.90 - 12.70 K/uL    Hemoglobin 13.0 (L) 14.0 - 18.0 g/dL    Hematocrit 39.2 (L) 40.0 - 54.0 %    Platelets 289 150 - 350 K/uL   CBC auto differential    Collection Time: 05/30/17  6:22 PM   Result Value Ref Range    WBC 8.05 3.90 - 12.70 K/uL    Hemoglobin 14.9 14.0 - 18.0 g/dL    Hematocrit 44.5 40.0 - 54.0 %    Platelets 311 150 - 350 K/uL   CBC auto differential     "Collection Time: 05/29/17  2:30 PM   Result Value Ref Range    WBC 7.36 3.90 - 12.70 K/uL    Hemoglobin 14.1 14.0 - 18.0 g/dL    Hematocrit 43.0 40.0 - 54.0 %    Platelets 291 150 - 350 K/uL     Recent Results (from the past 336 hour(s))   Basic metabolic panel    Collection Time: 06/02/17  7:16 AM   Result Value Ref Range    Sodium 139 136 - 145 mmol/L    Potassium 3.4 (L) 3.5 - 5.1 mmol/L    Chloride 100 95 - 110 mmol/L    CO2 31 (H) 23 - 29 mmol/L    BUN, Bld 6 6 - 20 mg/dL    Creatinine 1.2 0.5 - 1.4 mg/dL    Calcium 9.6 8.7 - 10.5 mg/dL    Anion Gap 8 8 - 16 mmol/L   Basic Metabolic Panel (BMP)    Collection Time: 06/01/17  7:47 PM   Result Value Ref Range    Sodium 140 136 - 145 mmol/L    Potassium 3.7 3.5 - 5.1 mmol/L    Chloride 102 95 - 110 mmol/L    CO2 26 23 - 29 mmol/L    BUN, Bld 6 6 - 20 mg/dL    Creatinine 1.1 0.5 - 1.4 mg/dL    Calcium 9.3 8.7 - 10.5 mg/dL    Anion Gap 12 8 - 16 mmol/L   Basic Metabolic Panel (BMP)    Collection Time: 06/01/17  5:10 PM   Result Value Ref Range    Sodium 138 136 - 145 mmol/L    Potassium 4.3 3.5 - 5.1 mmol/L    Chloride 100 95 - 110 mmol/L    CO2 28 23 - 29 mmol/L    BUN, Bld 6 6 - 20 mg/dL    Creatinine 1.2 0.5 - 1.4 mg/dL    Calcium 9.6 8.7 - 10.5 mg/dL    Anion Gap 10 8 - 16 mmol/L     Lab Results   Component Value Date    HGBA1C 15.5 (H) 05/30/2017       Discharge Medication List:     Medication List      START taking these medications    amlodipine 5 MG tablet  Commonly known as:  NORVASC  Take 1 tablet (5 mg total) by mouth once daily.     insulin aspart 100 unit/mL Inpn pen  Commonly known as:  NovoLOG  Inject 3 Units into the skin 3 (three) times daily.     lisinopril 10 MG tablet  Take 1 tablet (10 mg total) by mouth once daily.     pen needle, diabetic 29 gauge x 1/2" Ndle  Use to inject novolog three times daily        CHANGE how you take these medications    insulin glargine 100 unit/mL (3 mL) Inpn pen  Commonly known as:  LANTUS SOLOSTAR  Inject 26 Units " "into the skin every evening. 35 Insulin Pen Subcutaneous daily  What changed:  additional instructions        CONTINUE taking these medications    atorvastatin 20 MG tablet  Commonly known as:  LIPITOR  Take 1 tablet (20 mg total) by mouth once daily.     blood sugar diagnostic Strp  1 strip by Misc.(Non-Drug; Combo Route) route once daily.     lancets Misc  Commonly known as:  ACCU-CHEK SOFTCLIX LANCETS  1 Device by Misc.(Non-Drug; Combo Route) route once daily.     metformin 500 MG 24 hr tablet  Commonly known as:  GLUCOPHAGE-XR  TAKE TWO TABLETS BY MOUTH TWICE A DAY WITH MEALS        STOP taking these medications    lisinopril-hydrochlorothiazide 10-12.5 mg per tablet  Commonly known as:  PRINZIDE,ZESTORETIC     TRULICITY 0.75 mg/0.5 mL Pnij  Generic drug:  dulaglutide           Where to Get Your Medications      These medications were sent to [facility] [address]  [address] [facility] [address]    Phone:  [phone]    amlodipine 5 MG tablet   insulin aspart 100 unit/mL Inpn pen   lisinopril 10 MG tablet   pen needle, diabetic 29 gauge x 1/2" Ndle         Patient Instructions:  No discharge procedures on file.    Signing Physician:  Mary Vargas MD    "

## 2017-06-02 NOTE — PLAN OF CARE
Problem: Patient Care Overview  Goal: Plan of Care Review  Outcome: Ongoing (interventions implemented as appropriate)  POC reviewed with patient who verbalized understanding. AAOX4. Pt O2 sats stable on RA. Right hand IV intact and patent. Right AC IV intact and patent. Pt ate breakfast and became nauseated. Pt verbalized he feels like food wont go down. Pt had one vomiting incidence. Pt voiding via urinal. Pt bladder scanned and had 375cc in bladder. Pt voided 275cc right after bladder scan. Pt on tele running NSR between 90s-100s. BG being monitored ACHS and being controlled with PRN and scheduled insulin. Pt walked down yu X2. Pain being controlled with PRN pain medication. Pt remained free from falls throughout the shift. VSS. Will continue to monitor.

## 2017-06-02 NOTE — PROGRESS NOTES
Progress Note  Hospital Medicine                                                              [place] [facility] [place] [facility]    Patient Name: Indio Ryder Jr.  YOB: 1970    Admit Date: 5/30/2017                     LOS: 3    SUBJECTIVE:     Reason for Admission:  Diabetic ketoacidosis without coma associated with type 2 diabetes mellitus  45 y/o AAM with hx of T2DM (poorly  controlled, with long term insulin use, A1C of 11) and HTN presents to the ED with diarrhea that started 1 week ago. Following development of diarrhea, patient experienced intractable nausea and vomiting, non-bloody, non- bilious, with vomiting episodes occurring several timer per hour. Diarrhea has since resolved, however, n/v has persisted. Patient stopped taking his insulin (26u lantus QHS) as well as metformin at the onset of n/v.In the ED, patient was noted to be HDS. Glucose of 272, bicarb of 18, AG Of 23, BHB of 3.7. VBG with pH of 7.36 and PO2 of 38. Cr of 1.4, corrected sodium ~143-145. No WBC elevation. He was actively vomiting in the ED. Received 2 L NS. He was started on an insulin gtt at this time and BMPs were followed q 4 hours.    Interval history:   Doing well this morning. Denies N/V; ordering breakfast while in the room. Feels well overall and much improved     OBJECTIVE:     Vital Signs Range (Last 24H):  Temp:  [97.5 °F (36.4 °C)-99.3 °F (37.4 °C)]   Pulse:  []   Resp:  [16-18]   BP: (106-178)/(68-96)   SpO2:  [96 %-98 %] Body mass index is 25.9 kg/m².  Wt Readings from Last 1 Encounters:   05/30/17 1739 86.6 kg (191 lb)       I & O (Last 24H):    Intake/Output Summary (Last 24 hours) at 06/02/17 0753  Last data filed at 06/01/17 2358   Gross per 24 hour   Intake              600 ml   Output              600 ml   Net                0 ml     Physical Exam:  General: alert, oriented and appears stated age  HENT: NC/AT.Conjunctivae/corneas clear. PERRL, EOMI. Nares normal. Septum midline. Mucosa normal. No  drainage or sinus tenderness.  Neck: no adenopathy, no carotid bruit, no JVD, supple, symmetrical, trachea midline and thyroid not enlarged, symmetric, no tenderness/mass/nodules  Back: symmetric, no curvature. ROM normal. No CVA tenderness.  Lungs: clear to auscultation bilaterally, respiratory effort normal  CV: RRR, S1 and S2 Normal. No chest wall tenderness  Abdomen: S, NT, ND. Bowel sounds normal   Extremities: WWP, intact distal pulses. no cyanosis or edema  Skin: Skin color, texture, turgor normal. No rashes or lesions  Lymph nodes: Cervical, supraclavicular, and axillary nodes normal.  Neurologic: Grossly normal      Diagnostic Results:  Lab Results   Component Value Date    WBC 7.96 05/31/2017    HGB 13.0 (L) 05/31/2017    HCT 39.2 (L) 05/31/2017    MCV 88 05/31/2017     05/31/2017       Recent Labs  Lab 06/01/17  1947   *      K 3.7      CO2 26   BUN 6   CREATININE 1.1   CALCIUM 9.3     Lab Results   Component Value Date    INR 1.1 01/16/2017    INR 1.1 01/12/2017    INR 0.9 01/29/2006     Lab Results   Component Value Date    HGBA1C 15.5 (H) 05/30/2017     Recent Labs      05/31/17   1437  05/31/17   1536  05/31/17   1653  05/31/17   2149  06/01/17   0919  06/01/17   1411  06/01/17   1635  06/01/17   2122   POCTGLUCOSE  246*  240*  271*  219*  113*  75  156*  185*       ASSESSMENT/PLAN:   47 y/o AAM with hx of T2DM (poorly  controlled, with long term insulin use, A1C of 11) and HTN presents to the ED with diarrhea that started 1 week ago with associated N/V, who stopped taking insulin and metformin on sx onset, and presented to the ED in DKA, started on insulin gtt     Diabetic Ketoacidosis without coma associated with type 2 DM  - Resolved   - Patient with elevated AG and BHB on admission. Started on insulin gtt. Gap closed 5/31 and transitioned off insulin gtt.   - BG range last 24 hrs   - continue regiment of 26 basal and 3 units TIDWM  - space BMPs to q 24      Nausea  and Vomiting  - resolved as of this AM   - reglan prn with meals and continue to monitor.     TAHIR  - Improving   - on admission Cr 1.4, baseline 1 now trending down to 1.2.    -bmp this AM pending  -most likely pre-renal given hx poor PO intake and GI losses and improved somewhat with IVFs but may also be new baseline    HTN  - on lisinopril-HCTZ combo  - resume ace at 10 mg, in setting of added amlodipine with hold HCTZ component   - amlodipine 5, continue at discharge     Hypokalemia  - replace as needed    Hyperlipidemia  - continue statin     PPx: lovenox  Diet: NPO   Dispo  - home today if continues to tolerate diet and continues without N/V    Mary Vargas MD  IM4        -

## 2017-06-03 VITALS
DIASTOLIC BLOOD PRESSURE: 82 MMHG | HEIGHT: 72 IN | RESPIRATION RATE: 18 BRPM | BODY MASS INDEX: 25.87 KG/M2 | WEIGHT: 191 LBS | TEMPERATURE: 97 F | OXYGEN SATURATION: 97 % | SYSTOLIC BLOOD PRESSURE: 136 MMHG | HEART RATE: 103 BPM

## 2017-06-03 LAB
ANION GAP SERPL CALC-SCNC: 9 MMOL/L
BUN SERPL-MCNC: 7 MG/DL
CALCIUM SERPL-MCNC: 8.9 MG/DL
CHLORIDE SERPL-SCNC: 98 MMOL/L
CO2 SERPL-SCNC: 29 MMOL/L
CREAT SERPL-MCNC: 1.1 MG/DL
EST. GFR  (AFRICAN AMERICAN): >60 ML/MIN/1.73 M^2
EST. GFR  (NON AFRICAN AMERICAN): >60 ML/MIN/1.73 M^2
GLUCOSE SERPL-MCNC: 231 MG/DL
POCT GLUCOSE: 180 MG/DL (ref 70–110)
POCT GLUCOSE: 221 MG/DL (ref 70–110)
POCT GLUCOSE: 273 MG/DL (ref 70–110)
POTASSIUM SERPL-SCNC: 3.7 MMOL/L
SODIUM SERPL-SCNC: 136 MMOL/L

## 2017-06-03 PROCEDURE — 25000003 PHARM REV CODE 250: Performed by: STUDENT IN AN ORGANIZED HEALTH CARE EDUCATION/TRAINING PROGRAM

## 2017-06-03 PROCEDURE — 80048 BASIC METABOLIC PNL TOTAL CA: CPT

## 2017-06-03 PROCEDURE — 63600175 PHARM REV CODE 636 W HCPCS: Performed by: HOSPITALIST

## 2017-06-03 PROCEDURE — C9113 INJ PANTOPRAZOLE SODIUM, VIA: HCPCS | Performed by: HOSPITALIST

## 2017-06-03 PROCEDURE — 25000003 PHARM REV CODE 250: Performed by: ANESTHESIOLOGY

## 2017-06-03 PROCEDURE — 99232 SBSQ HOSP IP/OBS MODERATE 35: CPT | Mod: ,,, | Performed by: HOSPITALIST

## 2017-06-03 PROCEDURE — 36415 COLL VENOUS BLD VENIPUNCTURE: CPT

## 2017-06-03 RX ORDER — PEN NEEDLE, DIABETIC 29 G X1/2"
NEEDLE, DISPOSABLE MISCELLANEOUS
Qty: 90 EACH | Refills: 1 | Status: SHIPPED | OUTPATIENT
Start: 2017-06-03 | End: 2017-08-11

## 2017-06-03 RX ORDER — METOCLOPRAMIDE 10 MG/1
10 TABLET ORAL
Qty: 30 TABLET | Refills: 0 | Status: SHIPPED | OUTPATIENT
Start: 2017-06-03 | End: 2017-06-09

## 2017-06-03 RX ORDER — INSULIN ASPART 100 [IU]/ML
3 INJECTION, SOLUTION INTRAVENOUS; SUBCUTANEOUS 3 TIMES DAILY
Qty: 2.7 ML | Refills: 1 | Status: SHIPPED | OUTPATIENT
Start: 2017-06-03 | End: 2017-06-09 | Stop reason: SDUPTHER

## 2017-06-03 RX ORDER — ONDANSETRON 2 MG/ML
8 INJECTION INTRAMUSCULAR; INTRAVENOUS EVERY 6 HOURS PRN
Status: DISCONTINUED | OUTPATIENT
Start: 2017-06-03 | End: 2017-06-03 | Stop reason: HOSPADM

## 2017-06-03 RX ORDER — ONDANSETRON 4 MG/1
4 TABLET, FILM COATED ORAL EVERY 6 HOURS PRN
Qty: 30 TABLET | Refills: 0 | Status: SHIPPED | OUTPATIENT
Start: 2017-06-03 | End: 2017-06-09

## 2017-06-03 RX ORDER — AMLODIPINE BESYLATE 5 MG/1
5 TABLET ORAL DAILY
Qty: 30 TABLET | Refills: 1 | Status: SHIPPED | OUTPATIENT
Start: 2017-06-03 | End: 2017-06-09

## 2017-06-03 RX ORDER — ONDANSETRON 4 MG/1
4 TABLET, FILM COATED ORAL EVERY 4 HOURS PRN
Status: DISCONTINUED | OUTPATIENT
Start: 2017-06-03 | End: 2017-06-03 | Stop reason: HOSPADM

## 2017-06-03 RX ORDER — LISINOPRIL 10 MG/1
10 TABLET ORAL DAILY
Qty: 30 TABLET | Refills: 1 | Status: SHIPPED | OUTPATIENT
Start: 2017-06-03 | End: 2017-06-09

## 2017-06-03 RX ADMIN — ATORVASTATIN CALCIUM 20 MG: 20 TABLET, FILM COATED ORAL at 09:06

## 2017-06-03 RX ADMIN — METOCLOPRAMIDE 10 MG: 10 TABLET ORAL at 05:06

## 2017-06-03 RX ADMIN — METOCLOPRAMIDE 10 MG: 10 TABLET ORAL at 01:06

## 2017-06-03 RX ADMIN — AMLODIPINE BESYLATE 5 MG: 5 TABLET ORAL at 09:06

## 2017-06-03 RX ADMIN — SODIUM CHLORIDE, SODIUM LACTATE, POTASSIUM CHLORIDE, AND CALCIUM CHLORIDE 500 ML: .6; .31; .03; .02 INJECTION, SOLUTION INTRAVENOUS at 12:06

## 2017-06-03 RX ADMIN — INSULIN ASPART 2 UNITS: 100 INJECTION, SOLUTION INTRAVENOUS; SUBCUTANEOUS at 09:06

## 2017-06-03 RX ADMIN — INSULIN ASPART 3 UNITS: 100 INJECTION, SOLUTION INTRAVENOUS; SUBCUTANEOUS at 09:06

## 2017-06-03 RX ADMIN — LISINOPRIL 10 MG: 10 TABLET ORAL at 09:06

## 2017-06-03 RX ADMIN — INSULIN ASPART 3 UNITS: 100 INJECTION, SOLUTION INTRAVENOUS; SUBCUTANEOUS at 12:06

## 2017-06-03 RX ADMIN — INSULIN ASPART 1 UNITS: 100 INJECTION, SOLUTION INTRAVENOUS; SUBCUTANEOUS at 12:06

## 2017-06-03 RX ADMIN — PANTOPRAZOLE SODIUM 40 MG: 40 INJECTION, POWDER, FOR SOLUTION INTRAVENOUS at 09:06

## 2017-06-03 NOTE — NURSING
Discharge instructions given to patient and patients' mother. Patient and patients' mother verbalized understanding. Patients' mother retrieved medications from pharmacy. Patient escorted off floor in  free of SS of distress.

## 2017-06-03 NOTE — PLAN OF CARE
Problem: Patient Care Overview  Goal: Plan of Care Review  Outcome: Ongoing (interventions implemented as appropriate)  Plan of care reviewed with patient, who verbalized understanding. Pt describes pain as well controlled with PRN Tylenol, but upset stomach persists. Additional PRN medications added to MAR for patient comfort. Importance of frequent ambulation stressed. Late arrival of meal tray pushed insulin schedule back a few hours to avoid insulin stacking. Pt transferred to Room 1126.

## 2017-06-03 NOTE — PROGRESS NOTES
Pt tolerated liquid diet last night and regular diabetic diet this morning and afternoon. Updated patient regarding new medication changes, and sent medications to [facility] for . Informed patients family at bedside that pharmacy closes at 4pm, and family member at bedside stated she will go down to get them before.    All questions and concerns answered. Patient has follow up appt scheduled for next week with priority care.    Mary Vargas MD  3:17 PM     Paged by pharmacy that novolog pen is not covered, but vials and syringes are. Also informed that amlodipine and lisinopril tablets filled by outpatient pharmacy already. [facility] to adjust novolog rx to vials and syringes, which is covered, and temporary supply of BP meds to be given today as well until patient can  filled prescriptions from outpatient pharmacy Monday.    Explained above to patient's nurse and patient. Nursing to provide teaching regarding administration of novolog with vials and syringe equipment.     Mary Vargas MD  3:25 PM

## 2017-06-03 NOTE — NURSING
New swelling on patient right arm. Non-pitting edema noted, pulses palpable. Notified IM4 instructed to remove IV on right arm, elevate arm, and apply warm compress.

## 2017-06-03 NOTE — PROGRESS NOTES
Progress Note  Hospital Medicine                                                              [place] [facility] [place] [facility]    Patient Name: Indio Ryder Jr.  YOB: 1970    Admit Date: 5/30/2017                     LOS: 4    SUBJECTIVE:     Reason for Admission:  Diabetic ketoacidosis without coma associated with type 2 diabetes mellitus  45 y/o AAM with hx of T2DM (poorly  controlled, with long term insulin use, A1C of 11) and HTN presents to the ED with diarrhea that started 1 week ago. Following development of diarrhea, patient experienced intractable nausea and vomiting, non-bloody, non- bilious, with vomiting episodes occurring several timer per hour. Diarrhea has since resolved, however, n/v has persisted. Patient stopped taking his insulin (26u lantus QHS) as well as metformin at the onset of n/v.In the ED, patient was noted to be HDS. Glucose of 272, bicarb of 18, AG Of 23, BHB of 3.7. VBG with pH of 7.36 and PO2 of 38. Cr of 1.4, corrected sodium ~143-145. No WBC elevation. He was actively vomiting in the ED. Received 2 L NS. He was started on an insulin gtt at this time and BMPs were followed q 4 hours.    Interval history:   Doing well this AM; no nausea and vomiting after starting scheduling reglan. Tolerated liquids yesterday, and is will to try bland foods this AM.     OBJECTIVE:     Vital Signs Range (Last 24H):  Temp:  [97.8 °F (36.6 °C)-100.1 °F (37.8 °C)]   Pulse:  []   Resp:  [16-18]   BP: ()/()   SpO2:  [94 %-99 %] Body mass index is 25.9 kg/m².  Wt Readings from Last 1 Encounters:   05/30/17 1739 86.6 kg (191 lb)       I & O (Last 24H):    Intake/Output Summary (Last 24 hours) at 06/03/17 0903  Last data filed at 06/02/17 2212   Gross per 24 hour   Intake              600 ml   Output              950 ml   Net             -350 ml     Physical Exam:  General: alert, oriented and appears stated age  HENT: NC/AT.Conjunctivae/corneas clear. PERRL, EOMI. Nares normal.  Septum midline. Mucosa normal. No drainage or sinus tenderness.  Neck: no adenopathy, no carotid bruit, no JVD, supple, symmetrical, trachea midline and thyroid not enlarged, symmetric, no tenderness/mass/nodules  Back: symmetric, no curvature. ROM normal. No CVA tenderness.  Lungs: clear to auscultation bilaterally, respiratory effort normal  CV: RRR, S1 and S2 Normal. No chest wall tenderness  Abdomen: S, NT, ND. Bowel sounds normal   Extremities: WWP, intact distal pulses. no cyanosis or edema  Skin: Skin color, texture, turgor normal. No rashes or lesions  Lymph nodes: Cervical, supraclavicular, and axillary nodes normal.  Neurologic: Grossly normal      Diagnostic Results:  Lab Results   Component Value Date    WBC 7.96 05/31/2017    HGB 13.0 (L) 05/31/2017    HCT 39.2 (L) 05/31/2017    MCV 88 05/31/2017     05/31/2017       Recent Labs  Lab 06/03/17  0510   *      K 3.7   CL 98   CO2 29   BUN 7   CREATININE 1.1   CALCIUM 8.9     Lab Results   Component Value Date    INR 1.1 01/16/2017    INR 1.1 01/12/2017    INR 0.9 01/29/2006     Lab Results   Component Value Date    HGBA1C 15.5 (H) 05/30/2017     Recent Labs      05/31/17   2149  06/01/17   0919  06/01/17   1411  06/01/17   1635  06/01/17   2122  06/02/17   0849  06/02/17   1926  06/03/17   0005   POCTGLUCOSE  219*  113*  75  156*  185*  156*  187*  273*       ASSESSMENT/PLAN:   45 y/o AAM with hx of T2DM (poorly  controlled, with long term insulin use, A1C of 11) and HTN presents to the ED with diarrhea that started 1 week ago with associated N/V, who stopped taking insulin and metformin on sx onset, and presented to the ED in DKA, started on insulin gtt     Diabetic Ketoacidosis without coma associated with type 2 DM  - Resolved   - Patient with elevated AG and BHB on admission. Started on insulin gtt. Gap closed 5/31 and transitioned off insulin gtt.   - BG range last 24 hrs   - continue regiment of 26 basal and 3 units TIDWM  -  space BMPs to q 24      Nausea and Vomiting  - resolved as of this AM   - advance from liquids to GI bland   - reglan with meals and continue to monitor.     TAHIR  - resolved, 1.1 today back to baseline   -most likely pre-renal given hx poor PO intake and GI losses and improved somewhat with IVFs but may also be new baseline    HTN  - on lisinopril-HCTZ combo  - resume ace at 10 mg, in setting of added amlodipine with hold HCTZ component   - amlodipine 5, continue at discharge     Hypokalemia  - replace as needed    Hyperlipidemia  - continue statin     PPx: lovenox  Diet: NPO   Dispo  - home today if continues to tolerate diet and continues without N/V  - priority care visit 6/9 scheduled     Mary Vargas MD  IM4        -

## 2017-06-03 NOTE — PROGRESS NOTES
Pt transferred to 1126 via wheelchair. Pt denies pain , but continues to have upset stomach. GI cocktail administered shortly before transport. Report called to receiving RN. Pt belongings and medication transported with patient.

## 2017-06-03 NOTE — PLAN OF CARE
Problem: Patient Care Overview  Goal: Plan of Care Review  Outcome: Ongoing (interventions implemented as appropriate)  Pt remained free from injury/falls this shift. VSS, afebrile. CBG monitored overnight. Scheduled and SSI coverage given as needed. Pt had no c/o pain or nausea. No episodes of vomiting. 500 cc bolus of LR given per MD orders. Skin intact, no new breakdown noted. Bed locked in lowest position, SR upx2. Will continue to monitor.

## 2017-06-06 NOTE — PLAN OF CARE
Pt d/c to home.     06/06/17 1106   Final Note   Assessment Type Final Discharge Note   Discharge Disposition Home   What phone number can be called within the next 1-3 days to see how you are doing after discharge? [phone]   Discharge/Hospital Encounter Summary to (non-[facility]) PCP n/a   Right Care Referral Info   Post Acute Recommendation No Care

## 2017-06-08 PROBLEM — B35.3 TINEA PEDIS OF LEFT FOOT: Status: RESOLVED | Noted: 2017-01-12 | Resolved: 2017-06-08

## 2017-06-08 PROBLEM — E87.6 HYPOKALEMIA: Status: RESOLVED | Noted: 2017-05-30 | Resolved: 2017-06-08

## 2017-06-08 PROBLEM — M86.271 SUBACUTE OSTEOMYELITIS OF RIGHT FOOT: Status: RESOLVED | Noted: 2017-01-12 | Resolved: 2017-06-08

## 2017-06-08 PROBLEM — E87.29 INCREASED ANION GAP METABOLIC ACIDOSIS: Status: RESOLVED | Noted: 2017-05-30 | Resolved: 2017-06-08

## 2017-06-08 PROBLEM — R11.2 INTRACTABLE VOMITING WITH NAUSEA: Status: RESOLVED | Noted: 2017-05-30 | Resolved: 2017-06-08

## 2017-06-08 PROBLEM — A42.9 ACTINOMYCES INFECTION: Status: RESOLVED | Noted: 2017-01-17 | Resolved: 2017-06-08

## 2017-06-08 PROBLEM — A49.1 GROUP B STREPTOCOCCAL INFECTION: Status: RESOLVED | Noted: 2017-01-13 | Resolved: 2017-06-08

## 2017-06-09 ENCOUNTER — OFFICE VISIT (OUTPATIENT)
Dept: PRIMARY CARE CLINIC | Facility: CLINIC | Age: 47
End: 2017-06-09
Payer: MEDICAID

## 2017-06-09 VITALS
HEIGHT: 73 IN | WEIGHT: 205.94 LBS | DIASTOLIC BLOOD PRESSURE: 84 MMHG | HEART RATE: 100 BPM | SYSTOLIC BLOOD PRESSURE: 122 MMHG | BODY MASS INDEX: 27.29 KG/M2

## 2017-06-09 DIAGNOSIS — I10 ESSENTIAL HYPERTENSION: Chronic | ICD-10-CM

## 2017-06-09 DIAGNOSIS — E11.40 TYPE 2 DIABETES MELLITUS WITH DIABETIC NEUROPATHY, WITH LONG-TERM CURRENT USE OF INSULIN: Primary | Chronic | ICD-10-CM

## 2017-06-09 DIAGNOSIS — E78.2 MIXED HYPERLIPIDEMIA: ICD-10-CM

## 2017-06-09 DIAGNOSIS — Z79.4 TYPE 2 DIABETES MELLITUS WITH DIABETIC NEUROPATHY, WITH LONG-TERM CURRENT USE OF INSULIN: Primary | Chronic | ICD-10-CM

## 2017-06-09 PROBLEM — E11.10 DIABETIC KETOACIDOSIS WITHOUT COMA ASSOCIATED WITH TYPE 2 DIABETES MELLITUS: Status: RESOLVED | Noted: 2017-05-30 | Resolved: 2017-06-09

## 2017-06-09 PROBLEM — R29.898 RIGHT LEG WEAKNESS: Status: RESOLVED | Noted: 2017-05-23 | Resolved: 2017-06-09

## 2017-06-09 PROBLEM — N17.9 AKI (ACUTE KIDNEY INJURY): Status: RESOLVED | Noted: 2017-05-30 | Resolved: 2017-06-09

## 2017-06-09 PROCEDURE — 99213 OFFICE O/P EST LOW 20 MIN: CPT | Mod: PBBFAC

## 2017-06-09 PROCEDURE — 99999 PR PBB SHADOW E&M-EST. PATIENT-LVL III: CPT | Mod: PBBFAC,,,

## 2017-06-09 PROCEDURE — 99496 TRANSJ CARE MGMT HIGH F2F 7D: CPT | Mod: PBBFAC

## 2017-06-09 RX ORDER — LISINOPRIL AND HYDROCHLOROTHIAZIDE 12.5; 2 MG/1; MG/1
1 TABLET ORAL DAILY
Qty: 90 TABLET | Refills: 4 | Status: SHIPPED | OUTPATIENT
Start: 2017-06-09 | End: 2017-06-20

## 2017-06-09 RX ORDER — ATORVASTATIN CALCIUM 40 MG/1
40 TABLET, FILM COATED ORAL DAILY
Qty: 90 TABLET | Refills: 3 | Status: SHIPPED | OUTPATIENT
Start: 2017-06-09 | End: 2017-07-06 | Stop reason: SDUPTHER

## 2017-06-09 RX ORDER — ASPIRIN 81 MG/1
81 TABLET ORAL DAILY
Refills: 0 | COMMUNITY
Start: 2017-06-09 | End: 2019-07-12

## 2017-06-09 RX ORDER — ALOGLIPTIN 12.5 MG/1
1 TABLET, FILM COATED ORAL EVERY MORNING
Qty: 30 TABLET | Refills: 11 | Status: SHIPPED | OUTPATIENT
Start: 2017-06-09 | End: 2017-08-11

## 2017-06-09 RX ORDER — INSULIN ASPART 100 [IU]/ML
0-10 INJECTION, SOLUTION INTRAVENOUS; SUBCUTANEOUS 4 TIMES DAILY PRN
Qty: 2.7 ML | Refills: 1
Start: 2017-06-09 | End: 2017-08-11

## 2017-06-09 RX ORDER — ALOGLIPTIN 12.5 MG/1
1 TABLET, FILM COATED ORAL EVERY MORNING
Qty: 90 TABLET | Refills: 4 | Status: SHIPPED | OUTPATIENT
Start: 2017-06-09 | End: 2017-06-09 | Stop reason: SDUPTHER

## 2017-06-09 RX ORDER — INSULIN GLARGINE 100 [IU]/ML
26 INJECTION, SOLUTION SUBCUTANEOUS NIGHTLY
Qty: 1 BOX | Refills: 3 | Status: SHIPPED | OUTPATIENT
Start: 2017-06-09 | End: 2017-08-11 | Stop reason: SDUPTHER

## 2017-06-09 NOTE — PROGRESS NOTES
Verified pt name and  and allergies  tolerated injection  no issues noted explained to stay 15 min for any adverse reactions noted

## 2017-06-09 NOTE — PROGRESS NOTES
[facility]  New Visit Progress Note   Recent Hospital Discharge     PRESENTING HISTORY     Chief Complaint/Reason for Visit:  Follow up Hospital Discharge   Chief Complaint   Patient presents with    Hospital Follow Up     PCP: Lorena Thakur MD    History of Present Illness: Mr. Indio Ryder Jr. is a 46 y.o. male who was recently admitted to the hospital.    Team: [facility] [place] [facility] MED 4  Admit Date: 5/30/2017  Discharge Date: 06/03/2017  Discharge Attending Physician: Fadi Birmingahm MD      Diagnoses:         Active Hospital Problems     Diagnosis   POA    *Diabetic ketoacidosis without coma associated with type 2 diabetes mellitus [E13.10]   Yes    Intractable vomiting with nausea [R11.2]   Yes    Increased anion gap metabolic acidosis [E87.2]   Yes    Hypokalemia [E87.6]   Yes    TAHIR (acute kidney injury) [N17.9]   Yes    Hyperlipidemia [E78.5]   Yes       Chronic    Essential hypertension [I10]   Yes       Chronic       Resolved Hospital Problems     Diagnosis Date Resolved POA   No resolved problems to display.         Discharged Condition: admit problems have stabilized       HOSPITAL COURSE:    Initial Presentation:   47 y/o AAM with hx of T2DM (poorly  controlled, with long term insulin use, A1C of 11) and HTN presented to the ED 5/30 with diarrhea that started 1 week ago. Following development of diarrhea, patient experienced intractable nausea and vomiting and patient reportedly stopped taking his insulin (26u lantus QHS) and  Metformin. On arrival to ED, patient was noted to be in HDS/DKA with  Glucose of 272, bicarb of 18, AG Of 23, BHB of 3.7. VBG with pH of 7.36 and PO2 of 38. Cr of 1.4, corrected sodium ~143-145.He was started on an insulin gtt at this time and BMPs were followed q 4 hours.     Course of Principle Problem for Admission:  Diabetic Ketoacidosis without coma associated with type 2 DM  - Resolved   - Patient with elevated AG and BHB on admission. Started on insulin gtt. Gap  closed 5/31 and transitioned off insulin gtt.   - BG range last 24 hrs   - continue regiment of 26 basal and 3 units TIDWM at discharge  - [facility] appt 6/9 scheduled for further adjustments      Other Medical Problems Addressed in the Hospital:  Nausea and Vomiting  - had episodes of nausea and vomiting yesterday; KUB unremarkable and resolved after starting reglan  - continue reglan with meals      TAHIR  - resolved   - on admission Cr 1.4, baseline ~1, now 1.1  -most likely pre-renal given hx poor PO intake and GI losses and improved somewhat with IVFs but may also be new baseline     HTN  - on lisinopril-HCTZ combo on admit  - resume ace at 10 mg, in setting of added amlodipine with hold HCTZ component   - amlodipine 5, continue at discharge      Hypokalemia  - replace as needed     Hyperlipidemia  - continue statin     ___________________________________________________________________    Today:  No more diarrhea. No nausea or vomiting.    Review of Systems:  Review of Systems   Constitutional: Negative for chills and fever.   Respiratory: Negative for shortness of breath.    Cardiovascular: Positive for leg swelling. Negative for chest pain.   Gastrointestinal: Negative for abdominal pain, diarrhea, heartburn, nausea and vomiting.   Skin: Negative for rash.   Neurological: Positive for dizziness.     PAST HISTORY:     Past Medical History:   Diagnosis Date    Actinomyces infection 1/17/2017    Right foot    Diabetic ketoacidosis without coma associated with type 2 diabetes mellitus 5/30/2017    Diabetic ulcer of right foot associated with type 2 diabetes mellitus 6/3/2015    Essential hypertension 6/6/2013    Group B streptococcal infection 1/13/2017    Mixed hyperlipidemia 8/12/2014    Shoulder impingement 8/12/2014    Subacute osteomyelitis of right foot 1/12/2017    Streptococcus agalactiae, Actinomyces odontolyticus    Traumatic amputation of fifth toe of right foot 7/2/2015     "Type 2 diabetes mellitus with diabetic neuropathy, with long-term current use of insulin 5/3/2016    Ulcerative colitis, unspecified        Past Surgical History:   Procedure Laterality Date    TOE AMPUTATION Right 06/05/2015    TOE AMPUTATION Right 01/19/2017       Family History   Problem Relation Age of Onset    Adopted: Yes    Hypertension Mother     Cancer Mother      breast    Diabetes Mother     Hypertension Father     Cataracts Father     Diabetes Sister     Hypertension Maternal Aunt        Social History     Social History    Marital status: Single     Spouse name: N/A    Number of children: 0    Years of education: N/A     Occupational History     [employer]     Social History Main Topics    Smoking status: Never Smoker    Smokeless tobacco: None    Alcohol use No    Drug use: No    Sexual activity: No     Other Topics Concern    None     Social History Narrative    None       MEDICATIONS & ALLERGIES:     Current Outpatient Prescriptions on File Prior to Visit   Medication Sig Dispense Refill    blood sugar diagnostic Strp 1 strip by Misc.(Non-Drug; Combo Route) route once daily. 300 strip 3    lancets (ACCU-CHEK SOFTCLIX LANCETS) Misc 1 Device by Misc.(Non-Drug; Combo Route) route once daily. 300 each 3    pen needle, diabetic 29 gauge x 1/2" Ndle Use to inject novolog three times daily 90 each 1     amlodipine (NORVASC) 5 MG tablet Take 1 tablet (5 mg total) by mouth once daily. 30 tablet 1     atorvastatin (LIPITOR) 20 MG tablet Take 1 tablet (20 mg total) by mouth once daily. 90 tablet 3     insulin aspart (NOVOLOG) 100 unit/mL InPn pen Inject 3 Units into the skin 3 (three) times daily. 2.7 mL 1     insulin glargine (LANTUS SOLOSTAR) 100 unit/mL (3 mL) InPn pen Inject 26 Units into the skin every evening. 35 Insulin Pen Subcutaneous daily (Patient taking differently: Inject 26 Units into the skin every evening. ) 3 Box 3    [DISCONTINUED] lisinopril 10 MG tablet Take " 1 tablet (10 mg total) by mouth once daily. 30 tablet 1     metformin (GLUCOPHAGE-XR) 500 MG 24 hr tablet TAKE TWO TABLETS BY MOUTH TWICE A DAY WITH MEALS 120 tablet 3                   No current facility-administered medications on file prior to visit.         Review of patient's allergies indicates:  No Known Allergies    OBJECTIVE:     Vital Signs:  Vitals:    06/09/17 0915   BP: 122/84   Pulse: 100     Wt Readings from Last 1 Encounters:   06/09/17 0915 93.4 kg (205 lb 14.6 oz)     Body mass index is 27.17 kg/m².     Physical Exam:  General: Well developed, well nourished. No distress.  HEENT: Head is normocephalic, atraumatic   Eyes: Clear conjunctiva.  Neck: Supple, symmetrical neck; trachea midline.  Lungs: Clear to auscultation bilaterally and normal respiratory effort.  Cardiovascular: Heart with regular rate and rhythm.    Extremities: Trace pedal edema.    Abdomen: Abdomen is soft, non-tender non-distended with normal bowel sounds.  Skin: Skin color, texture, turgor normal.    Psychiatric: Not depressed.    Laboratory  Lab Results   Component Value Date    WBC 7.96 05/31/2017    HGB 13.0 (L) 05/31/2017    HCT 39.2 (L) 05/31/2017    MCV 88 05/31/2017     05/31/2017     BMP  Lab Results   Component Value Date     06/03/2017    K 3.7 06/03/2017    CL 98 06/03/2017    CO2 29 06/03/2017    BUN 7 06/03/2017    CREATININE 1.1 06/03/2017    CALCIUM 8.9 06/03/2017    ANIONGAP 9 06/03/2017    ESTGFRAFRICA >60.0 06/03/2017    EGFRNONAA >60.0 06/03/2017     Lab Results   Component Value Date    ALT 12 05/30/2017    AST 15 05/30/2017    ALKPHOS 217 (H) 05/30/2017    BILITOT 0.5 05/30/2017     Lab Results   Component Value Date    INR 1.1 01/16/2017    INR 1.1 01/12/2017    INR 0.9 01/29/2006     Lab Results   Component Value Date    HGBA1C 15.5 (H) 05/30/2017       TRANSITION OF CARE:     [facility] On Call Contact Note: 6-6-17    Family and/or Caretaker present at visit?  No.  Diagnostic tests  reviewed/disposition: No diagnosic tests pending after this hospitalization.  Disease/illness education: DM2, HTN.  Home health/community services discussion/referrals: Patient does not have home health established from hospital visit.  They do not need home health.  If needed, we will set up home health for the patient.   Establishment or re-establishment of referral orders for community resources: No other necessary community resources.   Discussion with other health care providers: No discussion with other health care providers necessary.     Medications Reconciliation:   I have reconciled the patient's home medications and discharge medications with the patient/family. I have updated all changes.  Refer to After-Visit Medication List.  I have removed duplicate meds and discontinued meds.    ASSESSMENT & PLAN:       Type 2 diabetes mellitus with diabetic neuropathy, with long-term current use of insulin  - Uncontrolled A1c.    Patient wakes up around 11 am and sleeps around 11 pm.  - Metformin may have aggravated the diarrhea and nausea.    He has not tried Trulicity.  He is forgetful about once a week regimen.      Plan:  Trial of Lantus 27 daily plus alogliptin 12.5 mg (covered 100% by insurance) plus SSI.    Rx:  -     insulin aspart (NOVOLOG) 100 unit/mL InPn pen; Inject 0-10 Units into the skin 4 (four) times daily as needed.  Dispense: 2.7 mL; Refill: 1  -     alogliptin 12.5 mg Tab; Take 1 tablet by mouth every morning. 11 AM  Dispense: 30 tablet; Refill: 11  -     insulin glargine (LANTUS SOLOSTAR) 100 unit/mL (3 mL) InPn pen; Inject 26 Units into the skin every evening. 11 PM  Dispense: 1 Box; Refill: 3  -     aspirin (ECOTRIN) 81 MG EC tablet; Take 1 tablet (81 mg total) by mouth once daily. 11 am; Refill: 0    Vaccine Update:  -     DIPH,PERTUSS(ACEL),TET VAC(PF)(ADULT)(ADACEL)(TDaP); Inject 0.5 mLs into the muscle one time.  -     pneumoc 13-gabrielle conj-dip cr(PF) 0.5 mL; Inject 0.5 mLs into the muscle  vaccine x 1 dose for Meets Vaccination Criteria.    Essential hypertension  - He has LE edema. Will resume ACE-I plus mild diuretic.  -     lisinopril-hydrochlorothiazide (PRINZIDE,ZESTORETIC) 20-12.5 mg per tablet; Take 1 tablet by mouth once daily. 11 AM  Dispense: 90 tablet; Refill: 4  -     aspirin (ECOTRIN) 81 MG EC tablet; Take 1 tablet (81 mg total) by mouth once daily. 11 am; Refill: 0    Mixed hyperlipidemia  - Increased Lipitor to 40 mg daily due to vascular disease.  -     atorvastatin (LIPITOR) 40 MG tablet; Take 1 tablet (40 mg total) by mouth once daily. 11 am  Dispense: 90 tablet; Refill: 3    Patient Instructions:    11 AM:   Take 1 tablet of Lisinopril-HCTZ (blood pressure plus fluid pill)   Take 1 tablet of Alogliptin (diabetes)   Take 1 tablet of Atorvastatin (cholesterol)   Take 1 tablet of Baby aspirin 81 mg     11 PM    Take 27 units of Lantus    Insulin Dosing with Sliding Scale:       Inject Lantus (Gardner)  27 units nightly  - this is your basal insulin    Monitor blood sugar before meals and at bedtime.  Call physician if blood sugar < 60 or > 350 for two consecutive readings.          Glucose  Novolog Insulin Subcutaneous (Orange)        0 - 60   Orange juice or glucose tablet, hold insulin      No additional insulin   201-250  2 units   251-300  4 units   301-350  6 units   351-400  8 units   >400   10 units then call physician      Scheduled Follow-up :  Future Appointments  Date Time Provider Department Center   6/20/2017 10:00 AM PH[facility]ICIAN[facility]   6/29/2017 10:00 AM LAB, APPOINTMENT [facility] LAB IM [facility]   7/6/2017 9:40 AM Lorena Thakur MD [facility]       After Visit Medication List :     Medication List          Accurate as of 6/9/17 10:06 AM. If you have any questions, ask your nurse or doctor.               START taking these medications    alogliptin 12.5 mg Tab  Take 1 tablet by mouth every morning. 11 AM    "  aspirin 81 MG EC tablet  Commonly known as:  ECOTRIN  Take 1 tablet (81 mg total) by mouth once daily. 11 am     lisinopril-hydrochlorothiazide 20-12.5 mg per tablet  Commonly known as:  PRINZIDCARIEZESTORETIC  Take 1 tablet by mouth once daily. 11 AM        CHANGE how you take these medications    atorvastatin 40 MG tablet  Commonly known as:  LIPITOR  Take 1 tablet (40 mg total) by mouth once daily. 11 am  What changed:   medication strength   how much to take   additional instructions     insulin aspart 100 unit/mL Inpn pen  Commonly known as:  NovoLOG  Inject 0-10 Units into the skin 4 (four) times daily as needed.  What changed:   how much to take   when to take this   reasons to take this     insulin glargine 100 unit/mL (3 mL) Inpn pen  Commonly known as:  LANTUS SOLOSTAR  Inject 26 Units into the skin every evening. 11 PM  What changed:  additional instructions        CONTINUE taking these medications    blood sugar diagnostic Strp  1 strip by Misc.(Non-Drug; Combo Route) route once daily.     lancets Misc  Commonly known as:  ACCU-CHEK SOFTCLIX LANCETS  1 Device by Misc.(Non-Drug; Combo Route) route once daily.     pen needle, diabetic 29 gauge x 1/2" Ndle  Use to inject novolog three times daily        STOP taking these medications    amlodipine 5 MG tablet  Commonly known as:  NORVASC     lisinopril 10 MG tablet     metformin 500 MG 24 hr tablet  Commonly known as:  GLUCOPHAGE-XR     metoclopramide HCl 10 MG tablet  Commonly known as:  REGLAN     ondansetron 4 MG tablet  Commonly known as:  ZOFRAN           Where to Get Your Medications      These medications were sent to [facility] [address] [facility]  [address] [facility] [address] [facility] [address]    Phone:  [phone]    alogliptin 12.5 mg Tab   atorvastatin 40 MG tablet   insulin glargine 100 unit/mL (3 mL) Inpn pen   lisinopril-hydrochlorothiazide 20-12.5 mg per tablet     You can get these medications from any " pharmacy    You don't need a prescription for these medications   aspirin 81 MG EC tablet     Information about where to get these medications is not yet available    Ask your nurse or doctor about these medications   insulin aspart 100 unit/mL Inpn pen           Signing Physician:  Fadi James MD

## 2017-06-09 NOTE — Clinical Note
[facility] Visit (Post Discharge Follow-up) Today:  - Our clinic physicians and nurses plan to follow the patient up for any medical issues in the [facility] for 30 days post discharge.  Future Appointments: 6/20/2017  10:00 AM   PHYSICIAN, [facility]   [facility]  6/29/2017  10:00 AM   LAB, APPOINTMENT [facility] INT* [facility] LAB IM    [facility]  7/6/2017   9:40 AM    Lorena Thakur MD        [facility]        [facility]

## 2017-06-20 ENCOUNTER — OFFICE VISIT (OUTPATIENT)
Dept: PRIMARY CARE CLINIC | Facility: CLINIC | Age: 47
End: 2017-06-20
Payer: MEDICAID

## 2017-06-20 VITALS
SYSTOLIC BLOOD PRESSURE: 123 MMHG | DIASTOLIC BLOOD PRESSURE: 79 MMHG | BODY MASS INDEX: 26.91 KG/M2 | WEIGHT: 203.06 LBS | HEART RATE: 95 BPM | HEIGHT: 73 IN

## 2017-06-20 DIAGNOSIS — E78.2 MIXED HYPERLIPIDEMIA: ICD-10-CM

## 2017-06-20 DIAGNOSIS — E11.40 TYPE 2 DIABETES MELLITUS WITH DIABETIC NEUROPATHY, WITH LONG-TERM CURRENT USE OF INSULIN: Primary | Chronic | ICD-10-CM

## 2017-06-20 DIAGNOSIS — I10 ESSENTIAL HYPERTENSION: Chronic | ICD-10-CM

## 2017-06-20 DIAGNOSIS — Z79.4 TYPE 2 DIABETES MELLITUS WITH DIABETIC NEUROPATHY, WITH LONG-TERM CURRENT USE OF INSULIN: Primary | Chronic | ICD-10-CM

## 2017-06-20 PROCEDURE — 99214 OFFICE O/P EST MOD 30 MIN: CPT | Mod: S$PBB,,, | Performed by: INTERNAL MEDICINE

## 2017-06-20 PROCEDURE — 99999 PR PBB SHADOW E&M-EST. PATIENT-LVL III: CPT | Mod: PBBFAC,,,

## 2017-06-20 PROCEDURE — 99213 OFFICE O/P EST LOW 20 MIN: CPT | Mod: PBBFAC

## 2017-06-20 PROCEDURE — 3046F HEMOGLOBIN A1C LEVEL >9.0%: CPT | Mod: ,,, | Performed by: INTERNAL MEDICINE

## 2017-06-20 NOTE — PATIENT INSTRUCTIONS
11 AM:   Take 1 tablet of Alogliptin (diabetes)   Take 1 tablet of Atorvastatin (cholesterol)   Take 1 tablet of Baby aspirin 81 mg      11 PM:    Take 27 units of Lantus     Insulin Dosing with Sliding Scale:        Inject Lantus (Gardner)  27 units nightly  - this is your basal insulin     Monitor blood sugar before meals and at bedtime.  Call physician if blood sugar < 60 or > 350 for two consecutive readings.                           Glucose                                          Novolog Insulin Subcutaneous (Orange)                         0 - 60                                               Orange juice or glucose tablet, hold insulin                                                                      No additional insulin                        201-250                                           2 units                        251-300                                           4 units                        301-350                                           6 units                        351-400                                           8 units                        >400                                               10 units then call physician

## 2017-06-20 NOTE — PROGRESS NOTES
[facility]  Follow-up Visit Progress Note     PRESENTING HISTORY     PCP: Lorena Thakur MD  Chief Complaint/Reason for Visit:  Follow up visit from recent visit.  Last [facility] Visit: 6-9-17    Chief Complaint   Patient presents with    Hospital Follow Up     History of Present Illness: Mr. Indio Ryder Jr. is a 46 y.o. male.    Blood sugar levels:  105-110 in the morning per patient.    Blood pressure running low so he stopped the BP med.    Review of Systems:  Review of Systems   Constitutional: Negative for chills and fever.   Respiratory: Negative for shortness of breath.    Cardiovascular: Negative for chest pain and leg swelling.   Skin: Negative for rash.     PAST HISTORY:     Past Medical History:   Diagnosis Date    Actinomyces infection 1/17/2017    Right foot    Diabetic ketoacidosis without coma associated with type 2 diabetes mellitus 5/30/2017    Diabetic ulcer of right foot associated with type 2 diabetes mellitus 6/3/2015    Essential hypertension 6/6/2013    Group B streptococcal infection 1/13/2017    Mixed hyperlipidemia 8/12/2014    Shoulder impingement 8/12/2014    Subacute osteomyelitis of right foot 1/12/2017    Streptococcus agalactiae, Actinomyces odontolyticus    Traumatic amputation of fifth toe of right foot 7/2/2015    Type 2 diabetes mellitus with diabetic neuropathy, with long-term current use of insulin 5/3/2016    Ulcerative colitis, unspecified        Past Surgical History:   Procedure Laterality Date    TOE AMPUTATION Right 06/05/2015    TOE AMPUTATION Right 01/19/2017       Family History   Problem Relation Age of Onset    Adopted: Yes    Hypertension Mother     Cancer Mother      breast    Diabetes Mother     Hypertension Father     Cataracts Father     Diabetes Sister     Hypertension Maternal Aunt        Social History     Social History    Marital status: Single     Spouse name: N/A    Number of children: 0    Years of education: N/A  "    Occupational History     [employer]     Social History Main Topics    Smoking status: Never Smoker    Smokeless tobacco: Not on file    Alcohol use No    Drug use: No    Sexual activity: No     Other Topics Concern    Not on file     Social History Narrative    No narrative on file       MEDICATIONS & ALLERGIES:     Current Outpatient Prescriptions on File Prior to Visit   Medication Sig Dispense Refill    alogliptin 12.5 mg Tab Take 1 tablet by mouth every morning. 11 AM 30 tablet 11    aspirin (ECOTRIN) 81 MG EC tablet Take 1 tablet (81 mg total) by mouth once daily. 11 am  0    atorvastatin (LIPITOR) 40 MG tablet Take 1 tablet (40 mg total) by mouth once daily. 11 am 90 tablet 3    insulin aspart (NOVOLOG) 100 unit/mL InPn pen Inject 0-10 Units into the skin 4 (four) times daily as needed. 2.7 mL 1    insulin glargine (LANTUS SOLOSTAR) 100 unit/mL (3 mL) InPn pen Inject 26 Units into the skin every evening. 11 PM 1 Box 3    lancets (ACCU-CHEK SOFTCLIX LANCETS) Misc 1 Device by Misc.(Non-Drug; Combo Route) route once daily. 300 each 3    pen needle, diabetic 29 gauge x 1/2" Ndle Use to inject novolog three times daily 90 each 1     blood sugar diagnostic Strp 1 strip by Misc.(Non-Drug; Combo Route) route once daily. 300 strip 3            No current facility-administered medications on file prior to visit.         Review of patient's allergies indicates:  No Known Allergies    Medications Reconciliation:   I have reconciled the patient's home medications and discharge medications with the patient/family. I have updated all changes.  Refer to After-Visit Medication List.    OBJECTIVE:     Vital Signs:  Vitals:    06/20/17 1023   BP: 123/79   Pulse: 95     Wt Readings from Last 1 Encounters:   06/20/17 1023 92.1 kg (203 lb 0.7 oz)     Body mass index is 26.79 kg/m².     Physical Exam:  General: Well developed, well nourished. No distress.  HEENT: Head is normocephalic, atraumatic   Eyes: " Clear conjunctiva.  Neck: Supple, symmetrical neck; trachea midline.  Lungs: Clear to auscultation bilaterally and normal respiratory effort.  Cardiovascular: Heart with regular rate and rhythm.    Extremities: No LE edema. Wearing pressure stockings.  Abdomen: Abdomen is soft, non-tender non-distended with normal bowel sounds.  Skin: Skin color, texture, turgor normal. No rashes.  Musculoskeletal: Normal gait.   Psychiatric: Not depressed.    Laboratory  Lab Results   Component Value Date    WBC 7.96 05/31/2017    HGB 13.0 (L) 05/31/2017    HCT 39.2 (L) 05/31/2017     05/31/2017    CHOL 148 05/29/2017    TRIG 132 05/29/2017    HDL 33 (L) 05/29/2017    ALT 12 05/30/2017    AST 15 05/30/2017     06/03/2017    K 3.7 06/03/2017    CL 98 06/03/2017    CREATININE 1.1 06/03/2017    BUN 7 06/03/2017    CO2 29 06/03/2017    TSH 1.136 03/01/2012    PSA 0.65 08/11/2014    INR 1.1 01/16/2017    HGBA1C 15.5 (H) 05/30/2017       ASSESSMENT & PLAN:     Type 2 diabetes mellitus with diabetic neuropathy, with long-term current use of insulin  - Uncontrolled A1c.  Blood sugar levels have improved post changes to Rx.  - Regimen: Lantus 27 daily plus alogliptin 12.5 mg (covered 100% by insurance) plus SSI.    Rx: -     blood sugar diagnostic Strp; 1 strip by Misc.(Non-Drug; Combo Route) route once daily.  Dispense: 300 strip; Refill: 3               -     Ambulatory consult to Optometry    Essential hypertension  - Controlled currently without medications. Stopped Lisinopril-HCTZ.     Mixed hyperlipidemia  - Lipitor to 40 mg daily due to vascular disease.    Patient Instructions:     11 AM:   Take 1 tablet of Alogliptin (diabetes)   Take 1 tablet of Atorvastatin (cholesterol)   Take 1 tablet of Baby aspirin 81 mg      11 PM:    Take 27 units of Lantus     Insulin Dosing with Sliding Scale:        Inject Lantus ([place])  27 units nightly  - this is your basal insulin     Monitor blood sugar before meals and at bedtime.  Call  physician if blood sugar < 60 or > 350 for two consecutive readings.                           Glucose                                          Novolog Insulin Subcutaneous (Orange)                         0 - 60                                               Orange juice or glucose tablet, hold insulin                                                                      No additional insulin                        201-250                                           2 units                        251-300                                           4 units                        301-350                                           6 units                        351-400                                           8 units                        >400                                               10 units then call physician     Scheduled Follow-up :  Future Appointments  Date Time Provider Department Center   6/28/2017 10:20 AM Farrah Hoffman OD [facility] OPTOM [facility]   6/28/2017 11:00 AM LAB, APPOINTMENT [facility] LAB IM [facility]   7/6/2017 9:40 AM Lorena Thakur MD [facility]       After Visit Medication List :     Medication List          Accurate as of 6/20/17 10:47 AM. If you have any questions, ask your nurse or doctor.               CONTINUE taking these medications    alogliptin 12.5 mg Tab  Take 1 tablet by mouth every morning. 11 AM     aspirin 81 MG EC tablet  Commonly known as:  ECOTRIN  Take 1 tablet (81 mg total) by mouth once daily. 11 am     atorvastatin 40 MG tablet  Commonly known as:  LIPITOR  Take 1 tablet (40 mg total) by mouth once daily. 11 am     blood sugar diagnostic Strp  1 strip by Misc.(Non-Drug; Combo Route) route once daily.     insulin aspart 100 unit/mL Inpn pen  Commonly known as:  NovoLOG  Inject 0-10 Units into the skin 4 (four) times daily as needed.     insulin glargine 100 unit/mL (3 mL) Inpn pen  Commonly known as:  LANTUS SOLOSTAR  Inject 26 Units into the  "skin every evening. 11 PM     lancets Misc  Commonly known as:  ACCU-CHEK SOFTCLIX LANCETS  1 Device by Misc.(Non-Drug; Combo Route) route once daily.     pen needle, diabetic 29 gauge x 1/2" Ndle  Use to inject novolog three times daily        STOP taking these medications    lisinopril-hydrochlorothiazide 20-12.5 mg per tablet  Commonly known as:  PRINZIDE,ZESTORETIC           Where to Get Your Medications      These medications were sent to [facility] [address] [place] [address]  [address] [facility] [address]    Phone:  [phone]    blood sugar diagnostic Strp         Signing Physician:  Fadi James MD  "

## 2017-06-28 ENCOUNTER — LAB VISIT (OUTPATIENT)
Dept: LAB | Facility: HOSPITAL | Age: 47
End: 2017-06-28
Attending: INTERNAL MEDICINE
Payer: MEDICAID

## 2017-06-28 ENCOUNTER — OFFICE VISIT (OUTPATIENT)
Dept: OPTOMETRY | Facility: CLINIC | Age: 47
End: 2017-06-28
Payer: MEDICAID

## 2017-06-28 DIAGNOSIS — E11.40 TYPE 2 DIABETES MELLITUS WITH DIABETIC NEUROPATHY, WITH LONG-TERM CURRENT USE OF INSULIN: Chronic | ICD-10-CM

## 2017-06-28 DIAGNOSIS — E78.5 HYPERLIPIDEMIA, UNSPECIFIED HYPERLIPIDEMIA TYPE: Chronic | ICD-10-CM

## 2017-06-28 DIAGNOSIS — Z79.4 TYPE 2 DIABETES MELLITUS WITH DIABETIC NEUROPATHY, WITH LONG-TERM CURRENT USE OF INSULIN: Chronic | ICD-10-CM

## 2017-06-28 LAB
ALBUMIN SERPL BCP-MCNC: 3 G/DL
ALP SERPL-CCNC: 123 U/L
ALT SERPL W/O P-5'-P-CCNC: 20 U/L
ANION GAP SERPL CALC-SCNC: 7 MMOL/L
AST SERPL-CCNC: 22 U/L
BILIRUB SERPL-MCNC: 0.6 MG/DL
BUN SERPL-MCNC: 6 MG/DL
CALCIUM SERPL-MCNC: 9.2 MG/DL
CHLORIDE SERPL-SCNC: 107 MMOL/L
CHOLEST/HDLC SERPL: 3.1 {RATIO}
CO2 SERPL-SCNC: 28 MMOL/L
CREAT SERPL-MCNC: 0.9 MG/DL
EST. GFR  (AFRICAN AMERICAN): >60 ML/MIN/1.73 M^2
EST. GFR  (NON AFRICAN AMERICAN): >60 ML/MIN/1.73 M^2
GLUCOSE SERPL-MCNC: 225 MG/DL
HDL/CHOLESTEROL RATIO: 31.9 %
HDLC SERPL-MCNC: 166 MG/DL
HDLC SERPL-MCNC: 53 MG/DL
LDLC SERPL CALC-MCNC: 102.8 MG/DL
NONHDLC SERPL-MCNC: 113 MG/DL
POTASSIUM SERPL-SCNC: 4.3 MMOL/L
PROT SERPL-MCNC: 6.8 G/DL
SODIUM SERPL-SCNC: 142 MMOL/L
TRIGL SERPL-MCNC: 51 MG/DL

## 2017-06-28 PROCEDURE — 92004 COMPRE OPH EXAM NEW PT 1/>: CPT | Mod: S$PBB,,, | Performed by: OPTOMETRIST

## 2017-06-28 PROCEDURE — 80053 COMPREHEN METABOLIC PANEL: CPT

## 2017-06-28 PROCEDURE — 80061 LIPID PANEL: CPT

## 2017-06-28 PROCEDURE — 36415 COLL VENOUS BLD VENIPUNCTURE: CPT

## 2017-06-28 PROCEDURE — 99999 PR PBB SHADOW E&M-EST. PATIENT-LVL II: CPT | Mod: PBBFAC,,, | Performed by: OPTOMETRIST

## 2017-06-28 PROCEDURE — 83036 HEMOGLOBIN GLYCOSYLATED A1C: CPT

## 2017-06-28 RX ORDER — INSULIN ASPART 100 [IU]/ML
INJECTION, SOLUTION INTRAVENOUS; SUBCUTANEOUS
COMMUNITY
Start: 2017-06-03 | End: 2017-08-11 | Stop reason: SDUPTHER

## 2017-06-28 RX ORDER — SYRINGE AND NEEDLE,INSULIN,1ML 28GX1/2"
SYRINGE, EMPTY DISPOSABLE MISCELLANEOUS
COMMUNITY
Start: 2017-06-03 | End: 2017-08-11

## 2017-06-28 RX ORDER — LANCETS 30 GAUGE
EACH MISCELLANEOUS
COMMUNITY
Start: 2017-03-29

## 2017-06-28 NOTE — PROGRESS NOTES
HPI     Mr. Ryder was referred by Fadi James MD for a diabetic eye exam.    He reports clear vision all ranges with glasses. Pt declines refraction   today.     (--)drops  (--)pain  (--)flashes  (--)floaters  (--)diplopia    Diabetic Yes  LBS patient didn't check this morning.  Hemoglobin A1C       Date                     Value               Ref Range             Status                05/30/2017               15.5 (H)            4.5 - 6.2 %           Final                  03/29/2017               11.7 (H)            4.5 - 6.2 %           Final                  01/12/2017               15.0 (H)            4.5 - 6.2 %           Final             ----------    OCULAR HISTORY  Last Eye Exam 06/13/13 with    (--)eye surgery   (--)eye injury   (--)diagnosed or treated for any eye conditions or diseases   No diabetic retinopathy OU in 2013    FAMILY HISTORY  (--)Glaucoma   (--)Macular Degeneration     Last edited by Farrah Hoffman, OD on 6/28/2017 11:01 AM. (History)            Assessment /Plan     For exam results, see Encounter Report.    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 Continue diet changes and regular aerobic exercise. Continue management of DM as directed by PCP. Monitor with DFE in 6 months, or RTC immediately with any vision changes (recommended daily monocular monitoring).   Discussed potential risk for permanent vision impairment if blood glucose remains elevated.        RTC 6 months to recheck diabetic retinopathy (DFE OU), or sooner prn

## 2017-06-28 NOTE — LETTER
June 28, 2017      Fadi James MD  [address]  [place] [address]           [facility]  [address]  [place] [address]  Phone: [phone]          Patient: Indio Ryder Jr.   MR Number: [id number]   YOB: 1970   Date of Visit: 6/28/2017       Dear Dr. Fadi James:    Thank you for referring Indio Ryder to me for evaluation. Attached you will find relevant portions of my assessment and plan of care.    If you have questions, please do not hesitate to call me. I look forward to following Indio Ryder along with you.    Sincerely,    Farrah Hoffman, OD    Enclosure  CC:  No Recipients    If you would like to receive this communication electronically, please contact [email or web address] or [phone] to request more information on [facility] [employer] access.    For providers and/or their staff who would like to refer a patient to [facility], please contact us through our one-stop-shop provider referral line, [facility] , at [phone].    If you feel you have received this communication in error or would no longer like to receive these types of communications, please e-mail [email or web address]

## 2017-06-28 NOTE — Clinical Note
Dear Dr. James and Dr. Thakur,  Thank you for referring Mr. Ryder for a diabetic eye examination. He has mild non-proliferative diabetic retinopathy in the left eye. I will check him again in 6 months. Please let me know if you have questions.  Sincerely, Farrah Hoffman OD

## 2017-06-28 NOTE — PATIENT INSTRUCTIONS
What Is Diabetic Retinopathy?  Diabetic retinopathy is the leading cause of blindness in adults. It occurs when diabetes harms blood vessels inside the eye. These weak vessels leak fluid into an area of the eye called the retina. Weak new vessels can break and bleed into the retina. Old vessels can leak and cause swelling. These vessels can damage areas of the retina, causing blurry, distorted vision.    What causes diabetic retinopathy?  Diabetes is the cause of this eye disease. Over time, diabetes makes blood vessels weaken all over the body, including in the eyes. Poor blood sugar control can make retinopathy worse. So can smoking, high cholesterol, or poorly controlled high blood pressure. Pregnancy can also cause retinopathy to worsen.  What are the symptoms?  You can have diabetic retinopathy without knowing it. Usually, there is no pain and no outward sign. Over time, you may notice gradual blurring or some vision loss. Symptoms may come and go. Early treatment and good control of risk factors may help prevent vision loss or blindness.  What you can do  Have your eyes examined regularly by an eye specialist. Your healthcare provider will tell you when and how often you need these exams. You can also help control your diabetes through exercise, diet, and medicine, as instructed by your healthcare provider. These same steps may also help control diabetic retinopathy.           Treating Diabetic Retinopathy  Treatment may help slow the progress of diabetic retinopathy. Your treatment plan depends on your condition. It may include frequent exams to monitor your condition, laser treatment, and other procedures.      Laser is one type of treatment that may help limit vision loss from diabetic retinopathy.      Monitoring Your Vision  At first, your doctor may simply want to monitor your vision. In some cases, you may also have an angiogram. This test uses a special dye to create detailed images of the retina.  These images help your doctor decide whether special treatments are needed. You may also have ocular coherence tomography (OCT) testing. This uses light waves to see if there is fluid leaking into certain parts of the eye. It can also measure the thickness of the retina.  Types of Treatment  Special treatments can help stop bleeding, slow or stop new vessel growth, and preserve vision. The type of treatment you get depends on your condition:  · Laser treatment can help stop leaks and limit abnormal vessel growth.  · Surgery can remove the vitreous or repair a retina that is damaged by scar tissue formation. This may help if the vitreous becomes filled with blood and obscures your vision.  · Cryotherapy shrinks blood vessels and repairs the retina.  · Medications injected in the eye can help decrease swelling of the retina or slow the abnormal growth of blood vessels.   ·   © 0795-5207 [employer]. [address] [place] [address]. All rights reserved. This information is not intended as a substitute for professional medical care. Always follow your healthcare professional's instructions.         Managing Type 2 Diabetes  Type 2 diabetes is a long-term (chronic) condition. Managing your diabetes means making some changes that may be hard. Your health care provider, nurse, diabetes educator, and others can help you.  Managing type 2 diabetes means balancing your medicine with diet and activity. Managing your diabetes may also include checking your blood sugar. And, working with your health care provider to prevent complications.  Take your medicine as prescribed  You may take pills (oral medicine) or give yourself shots (insulin injections) for diabetes. Or you may use both. Taking your medicines or giving yourself insulin at the right times will help you control your blood sugar. Think about ways that will help you remember to take your medicines the right way every day. Ask your health care  provider, nurse, or diabetes educator for ideas.  Although you may only take pills for your diabetes, this may change. Over time, most people with type 2 diabetes also use insulin.  Eat healthy  A healthy, well-planned diet helps control the amount of sugar in your blood. It also helps you stay at a healthy weight or lose weight, if you are overweight. Extra weight makes it harder to control diabetes.  Your health care provider, nurse, a dietitian, or diabetes educator will help you create a plan that works for you. You don't have to give up all the foods you like. Having meals and snacks with vegetables, fruits, lean meats, or other healthy proteins, whole grains, and low- or no-fat dairy products will help control your blood sugar.  Be physically active  Being active helps lower your blood sugar. It does this by helping your body use insulin to turn food into energy. Activity also helps you manage your weight:  Ask your health care provider to work with you to create an activity program that's right for you. Your activity programs is based on your age, general health, and types of activity you enjoy. You should start slowly, but aim for at least 30 minutes of exercise or activity on most days.  Check your blood sugar  Checking your own blood sugar may be a regular part of your care. Or you may only check your blood sugar from time to time. Your health care provider will give you instructions about checking your blood sugar at home. Checking it tells you if your blood sugar is in your target range. A target range means that you are managing your diabetes well.  If your blood sugar levels are too high or too low, your health care provider may suggest changes to your diet or activity level. He or she may also adjust your medication.  Your health care provider may also tell you to check your blood sugar more often when you are sick. At certain times, for example, when you have a cold or the flu, you may need to check  it more often.  Take care of yourself  When you have diabetes, you may be more likely to develop other health problems. They include foot, eye, heart, and kidney problems. By controlling your blood sugar, and taking good care of yourself, you can help prevent these problems. Your health care provider, nurse, diabetes educator, and others can assist you.  · Checkups. You should have regular checkups with your health care provider. At those visits, you will have a physical exam that includes checking your feet. Your health care provider will also check your blood pressure and weight.  · Other exams. You should also have complete eye, foot, and dental exams at least once every year.  · Lab tests. You will have blood and urine tests.  ¨ At least 2 times a year, your health care provider will check your hemoglobin A1C. This blood test shows how well you have been controlling your blood sugar over 2 to 3 months. The results help your health care provider manage your diabetes.    ¨ You will also have other lab tests. For example, to check for kidney problems and abnormal cholesterol levels.  · Smoking. If you smoke, you will need to quit. Smoking increases the chance that you will develop complications from diabetes. Ask your health care provider about ways to quit.  · Vaccines. Get a yearly flu shot. And, ask your health care provider about vaccines to prevent pneumonia and hepatitis B.  Stress and depression  Most people have challenges throughout their lives. Living with diabetes, or any serious condition, can increase your stress and make you feel a lot of different emotions. In diabetes, feeling stressed or depressed can actually affect your blood sugar levels.  If you are having trouble dealing with diabetes, tell your health care provider. He or she can help or refer you to other health care providers or programs.  Support and resources  Know where you can get help. You can try the following:  · Support. Ask family  and friends to support your effects to take care of yourself. Or, look for a diabetes support group locally or on the internet. (Check the Connect with Others link on www.diabetes.org)  · Counseling. Talk with a , psychologist, psychiatrist, or other counselor.  · Information. Contact the American Diabetes Association at 213-385-2891 or www.diabetes.org      © 0642-0254 [employer]. [address] [place] [address], [place] [address]. All rights reserved. This information is not intended as a substitute for professional medical care. Always follow your healthcare professional's instructions.    ==============================================          Diabetes: The Benefits of Exercise  Exercise can lower blood sugar, help control weight, and boost your mood. It can also improve blood flow, lower blood pressure, and improve heart health. Even a small amount of regular activity can have a big impact on your health.      Take the stairs whenever you can.      What can exercise help?  · Blood sugar. Regular exercise improves blood sugar control by helping your body use insulin.  · Mental and emotional health. Physical activity relieves stress and helps you sleep better.  · Heart health. With regular exercise, you can reduce your risk of heart disease and high blood pressure. You can also improve your cholesterol and triglyceride levels.  · Weight. Exercise helps you lose fat, gain muscle, and control your weight.  · Health of blood vessels and nerves. Activity helps lower blood sugar. This helps prevent damage to blood vessels and nerves that can cause problems with your brain, eyes, feet, and legs.  · Finances. If you manage your blood sugar, you may spend less on medical care.  2 types of exercise  Two types of exercise help your body use blood sugar. Experts advise both types of exercise for people with diabetes:  · Aerobic exercise. This is a rhythmic, repeated, continued movement of large muscle  groups for at least 10 minutes at a time. Examples include walking, bicycling, jogging, swimming, water aerobics, and many sports.  · Resistance exercise (strength training). This type of exercise uses muscles to move weight or work against resistance. You can do it with free weights, machines, resistance tubing, or your own body weight.  A goal to shoot for  Your main goal is to become more active. Even a little bit helps. Choose an activity that you like. Walking is one great form of exercise that everyone can do. Talk to your doctor about any limits you may have before starting with an exercise program. Then aim for 40 minutes of moderate to high intensity physical activity on at least 3 to 4 days each week.   Getting activity into your day  Being more active doesnt have to be hard work. Try these to get more activity into your day:  · Take the stairs instead of the elevator  · Garden, do housework, and yard work  · Choose a parking space farther from the store  · Walk to talk to a co-worker instead of calling  · Take a 10-minute walk around the block at lunch  · Walk to a bus stop a little farther from your home or office  · Walk the dog after dinner  © 3051-3725 [employer]. [address] [place] [address], [place] [address]. All rights reserved. This information is not intended as a substitute for professional medical care. Always follow your healthcare professional's instructions.           Diabetes: Getting Started with Exercise  Getting started is easier than you think. Simple and small movements can get you started on a regular exercise routine. You dont need to join a gym to start moving. Choose an activity you enjoy. Start slowly and set small goals. Work activity into your daily life. Talk to your health care provider before starting an activity program. You may need to have a checkup before you begin.    Start with Movement  If youre not used to being active, start with gentle movements while  you watch TV. Raise your arms and legs while seated. Then repeat for 5 to 10 minutes. With time, add some slow walking. Even taking a flight of stairs instead of the elevator can lift you to healthier heights. These types of brief activities are great ways to get started. They can help lower your blood sugar level, strengthen your heart, and improve your energy.  Steps Toward Being More Active  Your goal, especially at first, is to keep your activity simple. Slowly work up to 30 minutes of activity a day. But you dont need to do it all at once. You can be active in 3, 10-minute sessions a day. You can also combine being active with the other things you need to do. For instance, stand up from your desk and walk around often when at work. Or, go for a walk around the mall before you shop.  Keep Your Activity Simple  Why make activity hard on yourself? Choose things that you like to do and that fit into your schedule. Here are some tips:  · Get off the bus a stop or 2 early and walk the rest of the way.  · Run small shopping errands on your bike.  · Go for a 10-minute walk after each meal.  · Park your car in the space farthest from where youre going.  · Get a pedometer that records the number of steps you take. Make a goal for the number of steps you take each day. Increase your goal a little each week.  Keep Your Activity Safe  · Be sure to warm up before you start and cool down when youre done.  · Carry or wear identification that says that you have diabetes.  · Eat 1 to 2 hours before you exercise, if instructed.  · Check your blood sugar before and after you exercise, if instructed. Check your blood sugar if you feel symptoms.  · Carry fast-acting sugar with you in case you have low blood sugar.  · Wear socks and well-fitting shoes.  · Think about the weather in your area. At times, you may need to choose indoor rather than outdoor activities.  Make Your Activity Fun  Mix fitness with fun. The more fun you  have, the more likely you are to stick to your plan. You can have a better blood sugar level along with an active, fun day. Try these hints:  · Choose an exercise that you enjoy and can do easily.  · Join a social club that goes for walks or does other physical activities.  · Go bird watching or do something else that gets you outdoors.  · Put on some music and dance.  · Involve your family or friends in your physical activity.  © 8812-1042 [employer]. [address] [place] [address], [place] [address]. All rights reserved. This information is not intended as a substitute for professional medical care. Always follow your healthcare professional's instructions.             Diabetes: Activity Tips  Being more active can help you manage your diabetes. The tips on this sheet can help you get the most from your exercise. They can also help you stay safe.    Benefit from Briskness  Brisk activity gets your heart beating faster. This can help you increase your fitness, lose extra weight, and manage your blood sugar level. Try brisk walking. Or, if you have foot or leg problems, you can try swimming or bike riding. You can break up your exercise into chunks throughout the day. Work up to at least 30 minutes of steady, brisk exercise on most days.  Warm Up and Cool Down  Warming up and cooling down reduce your risk of injury. They also help limit muscle soreness. Do a mild version of your activity for 5 minutes before and after your routine. You can also learn stretches that will help keep your muscles loose. Your health care provider may show you good ways to warm up and stretch.  Do the Talk-Sing Test  The talk-sing test is a simple way to tell how hard youre exercising. If you can talk while exercising, youre in a safe range. If youre out of breath, slow down. If you can carry a tune, its time to  the pace. Walk up a hill. Increase the resistance on your stationary bike. Or swim faster.  What About  Eating?  You may be told to plan your exercise for 1 to 2 hours after a meal. In most cases, you dont need to eat while being active. If you take insulin or medication that can cause low blood sugar, test your blood sugar before exercising. And carry a fast-acting sugar that will raise your blood sugar level quickly. This includes glucose tablets or hard candy. Use it if you feel low blood sugar symptoms.  Safety Tips  These tips can help you stay safe as you become fit:  · Exercise with a friend or carry a cell phone if you have one.  · Carry or wear identification that says you have diabetes.  · Use the proper footwear and safety equipment for your activity.  · Drink water before, during, and after exercise.  · Dress properly for the weather.  · Dont exercise in very hot or very cold weather.  · Dont exercise if you are sick.  · If you are instructed to do so, test your blood sugar before and after you exercise.  When to Stop Exercising and Call Your Doctor  Stop exercising and call your doctor right away if you notice any of the following:  · Pain, pressure, tightness, or heaviness in the chest  · Pain or heaviness in the neck, shoulders, back, arms, legs, or feet  · Unusual shortness of breath  · Dizziness or lightheadedness  · Unusually rapid or slow pulse  · Increased joint or muscle pain  · Nausea or vomiting  © 3751-4835 [employer]. [address] [place] [address], [place] [address]. All rights reserved. This information is not intended as a substitute for professional medical care. Always follow your healthcare professional's instructions.

## 2017-06-29 LAB
ESTIMATED AVG GLUCOSE: ABNORMAL MG/DL
HBA1C MFR BLD HPLC: >14 %

## 2017-07-02 PROCEDURE — 99496 TRANSJ CARE MGMT HIGH F2F 7D: CPT | Mod: S$PBB,,, | Performed by: INTERNAL MEDICINE

## 2017-07-03 ENCOUNTER — TELEPHONE (OUTPATIENT)
Dept: INTERNAL MEDICINE | Facility: CLINIC | Age: 47
End: 2017-07-03

## 2017-07-03 NOTE — TELEPHONE ENCOUNTER
pls let him know I put a referral into Endocrinology as DM very poorly controlled.  Keep appt with me this week  He should be hearing from them soon

## 2017-07-06 ENCOUNTER — OFFICE VISIT (OUTPATIENT)
Dept: INTERNAL MEDICINE | Facility: CLINIC | Age: 47
End: 2017-07-06
Payer: MEDICAID

## 2017-07-06 VITALS
DIASTOLIC BLOOD PRESSURE: 90 MMHG | HEIGHT: 73 IN | SYSTOLIC BLOOD PRESSURE: 154 MMHG | WEIGHT: 218.25 LBS | BODY MASS INDEX: 28.93 KG/M2 | HEART RATE: 88 BPM

## 2017-07-06 DIAGNOSIS — I10 ESSENTIAL HYPERTENSION: Chronic | ICD-10-CM

## 2017-07-06 DIAGNOSIS — E78.2 MIXED HYPERLIPIDEMIA: ICD-10-CM

## 2017-07-06 PROCEDURE — 99214 OFFICE O/P EST MOD 30 MIN: CPT | Mod: S$PBB,,, | Performed by: INTERNAL MEDICINE

## 2017-07-06 PROCEDURE — 3046F HEMOGLOBIN A1C LEVEL >9.0%: CPT | Mod: ,,, | Performed by: INTERNAL MEDICINE

## 2017-07-06 PROCEDURE — 99213 OFFICE O/P EST LOW 20 MIN: CPT | Mod: PBBFAC | Performed by: INTERNAL MEDICINE

## 2017-07-06 PROCEDURE — 4010F ACE/ARB THERAPY RXD/TAKEN: CPT | Mod: ,,, | Performed by: INTERNAL MEDICINE

## 2017-07-06 PROCEDURE — 99999 PR PBB SHADOW E&M-EST. PATIENT-LVL III: CPT | Mod: PBBFAC,,, | Performed by: INTERNAL MEDICINE

## 2017-07-06 RX ORDER — LISINOPRIL 10 MG/1
10 TABLET ORAL DAILY
Qty: 90 TABLET | Refills: 3 | Status: SHIPPED | OUTPATIENT
Start: 2017-07-06 | End: 2019-07-12

## 2017-07-06 RX ORDER — ATORVASTATIN CALCIUM 80 MG/1
TABLET, FILM COATED ORAL
Qty: 90 TABLET | Refills: 3 | Status: SHIPPED | OUTPATIENT
Start: 2017-07-06 | End: 2019-07-12

## 2017-07-06 NOTE — PROGRESS NOTES
Subjective:       Patient ID: Indio Ryder Jr. is a 46 y.o. male.    Chief Complaint: Follow-up    HPI   Admitted with DKA.  N, v have resolved.    Last aic 14.  [facility] added alogliptin  June 9 and he believes sugars are better.    103 yesterday am.  Taking lantus 26 units and SS novolog, which he often skips as SS begins at 200.   He has had occas low readings, if he skips a meal.  He doesn't check readings before lunch and dinner.  Only eats twice a day. But drinks 2 protein drinks goss.       BP up today which he blames on eating sea food last night.         Lisinopril hct stopped on admit and he stopped Lisinopril which was restarted in [facility] due to hypotension.   Also was on amlodipine, which he also stopped, due to orthostatic symptoms.            He is taking atorvastatin..           He has diabetic retinopathy on L.    Edema controlled with compression.  Review of Systems   Constitutional: Negative for activity change and unexpected weight change.   Respiratory: Negative for chest tightness and shortness of breath.    Cardiovascular: Negative for chest pain and leg swelling.   Gastrointestinal: Negative for abdominal pain.   Neurological: Negative for headaches.   Psychiatric/Behavioral: Negative for dysphoric mood.       Objective:      Physical Exam   Constitutional: He is oriented to person, place, and time. He appears well-developed and well-nourished.   Eyes: No scleral icterus.   Neck: No JVD present. No thyromegaly present.   Cardiovascular: Normal rate, regular rhythm and normal heart sounds.    Pulmonary/Chest: Effort normal and breath sounds normal. No respiratory distress. He has no wheezes. He has no rales.   Abdominal: Soft. He exhibits no mass. There is no tenderness.   Musculoskeletal: Edema: mid, wearing support stockings.   Neurological: He is alert and oriented to person, place, and time.   Psychiatric: He has a normal mood and affect. His behavior is normal.        150/98 sitting and standing  Results for orders placed or performed in visit on 06/28/17   Comprehensive metabolic panel   Result Value Ref Range    Sodium 142 136 - 145 mmol/L    Potassium 4.3 3.5 - 5.1 mmol/L    Chloride 107 95 - 110 mmol/L    CO2 28 23 - 29 mmol/L    Glucose 225 (H) 70 - 110 mg/dL    BUN, Bld 6 6 - 20 mg/dL    Creatinine 0.9 0.5 - 1.4 mg/dL    Calcium 9.2 8.7 - 10.5 mg/dL    Total Protein 6.8 6.0 - 8.4 g/dL    Albumin 3.0 (L) 3.5 - 5.2 g/dL    Total Bilirubin 0.6 0.1 - 1.0 mg/dL    Alkaline Phosphatase 123 55 - 135 U/L    AST 22 10 - 40 U/L    ALT 20 10 - 44 U/L    Anion Gap 7 (L) 8 - 16 mmol/L    eGFR if African American >60.0 >60 mL/min/1.73 m^2    eGFR if non African American >60.0 >60 mL/min/1.73 m^2   Hemoglobin A1c   Result Value Ref Range    Hemoglobin A1C >14.0 (H) 4.0 - 5.6 %    Estimated Avg Glucose Unable to calculate 68 - 131 mg/dL   Lipid panel   Result Value Ref Range    Cholesterol 166 120 - 199 mg/dL    Triglycerides 51 30 - 150 mg/dL    HDL 53 40 - 75 mg/dL    LDL Cholesterol 102.8 63.0 - 159.0 mg/dL    HDL/Chol Ratio 31.9 20.0 - 50.0 %    Total Cholesterol/HDL Ratio 3.1 2.0 - 5.0    Non-HDL Cholesterol 113 mg/dL     Assessment:       1. Uncontrolled type 2 diabetes mellitus with diabetic neuropathy, with long-term current use of insulin    2. Mixed hyperlipidemia    3. Essential hypertension        Plan:       Indio was seen today for follow-up.    Diagnoses and all orders for this visit:    Uncontrolled type 2 diabetes mellitus with diabetic neuropathy, with long-term current use of insulin  -     POCT Glucose                         - 167  Mixed hyperlipidemia  -    Increase atorvastatin (LIPITOR) 80 MG tablet; 1 tab po q day    Essential hypertension    Other orders  -     Add lisinopril 10 MG tablet; Take 1 tablet (10 mg total) by mouth once daily.       rtc 3mo     Diabetes empowerment requested last week.

## 2017-08-01 ENCOUNTER — CLINICAL SUPPORT (OUTPATIENT)
Dept: DIABETES | Facility: CLINIC | Age: 47
End: 2017-08-01
Payer: MEDICAID

## 2017-08-01 PROCEDURE — G0108 DIAB MANAGE TRN  PER INDIV: HCPCS | Mod: PBBFAC | Performed by: DIETITIAN, REGISTERED

## 2017-08-01 NOTE — LETTER
August 1, 2017      Lorena Thakur [facility]  [facility] [address]         Patient: Indio Ryder   MR Number: [id number]   YOB: 1970   Date of Visit: 8/1/2017       Dear Dr. Thakur:    Thank you for referring Indio for diabetes self-management education and support. He is scheduled for diabetes empowerment appt next week. Below is a summary of his clinical outcomes and goal progress.    Patient Outcomes:    A1c Status:   Lab Results   Component Value Date    HGBA1C >14.0 (H) 06/28/2017    HGBA1C 15.5 (H) 05/30/2017     Goals  Monitoring: Set ([facility] at least 3 times daily and bring log to empowerment appt.)  Start Date: 08/01/17  Target Date: 11/01/17         Follow up:   · Indio to follow diabetes support plan indicated above  · Indio to attend medical appointments as scheduled  · Indio to update you on his DM education progress as needed      If you have questions, please do not hesitate to call me. I look forward to providing additional education and support as needed.    Sincerely,    Rose Marie Washington RD

## 2017-08-01 NOTE — PROGRESS NOTES
Diabetes Education  Author: Rose Marie Washington RD  Date: 12/15/2017    Diabetes Education Visit  Diabetes Education Record Assessment/Progress: Initial    Diabetes Type  Diabetes Type : Type II         Nutrition  Meal Planning: diet drinks, skipping meals  Meal Plan 24 Hour Recall - Lunch: 6 in tuna on wheat or meatball sub, diet vitamin water  Meal Plan 24 Hour Recall - Dinner: meat, starch, vegetable, diet drink   Meal Plan 24 Hour Recall - Snack: premier protein shake    Monitoring   Self Monitoring : SMBG 2-3 times daily - did not bring log or meter - reports ranging 90s-160s  Blood Glucose Logs: No    Exercise   Exercise Type: use exercise equipment, running, 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 )    Social History  Preferred Learning Method: Face to Face, Reading Materials  Primary Support: Self  Smoking Status: Never a Smoker                                Barriers to Change  Barriers to Change: None  Learning Challenges : None    Readiness to Learn   Readiness to Learn : Acceptance    Cultural Influences  Cultural Influences: No    Diabetes Education Assessment/Progress    Acute Complications (preventing, detecting, and treating acute complications): Discussion, Instructed, Written Materials Provided, Competent (verbalizes/demonstrates), Individual Session (Reviewed s/s and proper treatment of hypoglycemia and hyperglycemia.)    Chronic Complications (preventing, detecting, and treating chronic complications): Discussion, Instructed, Competent (verbalizes/demonstrates), Written Materials Provided, Individual Session (Up to date on eye exam. Reviewed care schedule and home foot care guidelines. )    Diabetes Disease Process (diabetes  disease process and treatment options): Discussion, Instructed, Competent (verbalizes/demonstrates), Written Materials Provided, Individual Session    Nutrition (Incorporating nutritional management into one's lifestyle): Discussion, Instructed, Competent (verbalizes/demonstrates), Written Materials Provided, Individual Session (Has had diabetes education in the past and reports good understanding of diet. Reviewed CHO foods, serving sizes, timing/spacing of meals and rec'd eating pattern 45-60g CHO/meal and limiting snacks to mostly 0-5g CHO.)    Physical Activity (incorporating physical activity into one's lifestyle): Discussion, Instructed, Competent (verbalizes/demonstrates), Written Materials Provided, Individual Session (Discussed benefits and goals. Encouraged 150 min physical activity per week. )    Medications (states correct name, dose, onset, peak, duration, side effects & timing of meds): Discussion, Instructed, Competent (verbalizes/demonstrates), Written Materials Provided, Individual Session (Reviewed timing, dosage of DM meds. He verbalized proper insulin self-administration, site selection and rotation. Denies missed doses. However, did not take lantus when he had stomach virus and BG at hospital was high. Reinforced always take lantus, take same time daily and explained lantus does not need to taken with food.)    Monitoring (monitoring blood glucose/other parameters & using results): Discussion, Instructed, Competent (verbalizes/demonstrates), Written Materials Provided, Individual Session (Reviewed SMBG AC/HS, goal BG readings, keeping log and advised to bring log empowerment appt. Provided log sheets.)    Goal Setting and Problem Solving (verbalizes behavior change strategies & sets realistic goals): Discussion, Individual Session    Behavior Change (developing personal strategies to health & behavior change): Discussion, Individual Session    Goals  Monitoring: Set (SMBG at least 3 times daily  and bring log to empowerment appt.)  Start Date: 08/01/17  Target Date: 11/01/17         Diabetes Care Plan/Intervention  Education Plan/Intervention: Diabetes Empowerment Program (Scheduled empowerment appt 8/11.)    Diabetes Meal Plan  Carbohydrate Per Meal: 45-60g  Carbohydrate Per Snack :  (mostly 0-5g CHO snacks)    Education Units of Time   Time Spent: 60 min      Health Maintenance Topics with due status: Not Due       Topic Last Completion Date    Pneumococcal PPSV23 (Medium Risk) 08/12/2014    TETANUS VACCINE 06/09/2017    Eye Exam 06/28/2017    Foot Exam 08/11/2017    Lipid Panel 10/11/2017    Hemoglobin A1c 10/11/2017     Health Maintenance Due   Topic Date Due    Influenza Vaccine  08/01/2017

## 2017-08-11 ENCOUNTER — CLINICAL SUPPORT (OUTPATIENT)
Dept: DIABETES | Facility: CLINIC | Age: 47
End: 2017-08-11
Payer: MEDICAID

## 2017-08-11 VITALS
WEIGHT: 220 LBS | SYSTOLIC BLOOD PRESSURE: 140 MMHG | DIASTOLIC BLOOD PRESSURE: 90 MMHG | HEART RATE: 94 BPM | HEIGHT: 73 IN | BODY MASS INDEX: 29.16 KG/M2

## 2017-08-11 DIAGNOSIS — Z79.4 TYPE 2 DIABETES MELLITUS WITH DIABETIC NEUROPATHY, WITH LONG-TERM CURRENT USE OF INSULIN: Primary | Chronic | ICD-10-CM

## 2017-08-11 DIAGNOSIS — E11.3292 TYPE 2 DIABETES MELLITUS WITH LEFT EYE AFFECTED BY MILD NONPROLIFERATIVE RETINOPATHY WITHOUT MACULAR EDEMA, WITHOUT LONG-TERM CURRENT USE OF INSULIN: Chronic | ICD-10-CM

## 2017-08-11 DIAGNOSIS — E78.2 MIXED HYPERLIPIDEMIA: ICD-10-CM

## 2017-08-11 DIAGNOSIS — E11.40 TYPE 2 DIABETES MELLITUS WITH DIABETIC NEUROPATHY, WITH LONG-TERM CURRENT USE OF INSULIN: Primary | Chronic | ICD-10-CM

## 2017-08-11 DIAGNOSIS — I10 ESSENTIAL HYPERTENSION: Chronic | ICD-10-CM

## 2017-08-11 PROCEDURE — 99999 PR PBB SHADOW E&M-EST. PATIENT-LVL III: CPT | Mod: PBBFAC,,, | Performed by: NURSE PRACTITIONER

## 2017-08-11 PROCEDURE — 99214 OFFICE O/P EST MOD 30 MIN: CPT | Mod: S$PBB,,, | Performed by: NURSE PRACTITIONER

## 2017-08-11 PROCEDURE — 99213 OFFICE O/P EST LOW 20 MIN: CPT | Mod: PBBFAC | Performed by: NURSE PRACTITIONER

## 2017-08-11 RX ORDER — PEN NEEDLE, DIABETIC 31 GX5/16"
NEEDLE, DISPOSABLE MISCELLANEOUS
Qty: 100 EACH | Refills: 3 | Status: SHIPPED | OUTPATIENT
Start: 2017-08-11 | End: 2019-07-12

## 2017-08-11 RX ORDER — INSULIN GLARGINE 100 [IU]/ML
26 INJECTION, SOLUTION SUBCUTANEOUS NIGHTLY
Qty: 1 BOX | Refills: 3 | Status: SHIPPED | OUTPATIENT
Start: 2017-08-11 | End: 2019-07-12

## 2017-08-11 NOTE — Clinical Note
Indio ALVARADO Britni Anderson attended Diabetes Empowerment today and was started on Trulicity.  Indio ALVARADO Britni Anderson will be seen back in 3 months for a f/u with labs. Please let me know if I can be of any assistance. Thank you for allowing me to participate in Indio S Britni Anderson 's care.   Thanks JESUS Hernandez Endocrinology Nurse Practitioner ,e

## 2017-08-11 NOTE — PROGRESS NOTES
CC:  Type 2 diabetes.     HPI: Indio Ryder Jr. is a 47 y.o. male referred by Dr. Thakur  for [facility].  The patient has had type 2 diabetes along with associated comorbidities indicated in the Visit Diagnosis section of this encounter. The patient was diagnosed with Type 2 diabetes in ~2000. Cannot remember the details of his diagnosis. Started on Glucophage at diagnosis.  Started on insulin in ~2006. + Family hx of DM in  Mother.Hospitalized 5/2017 for DKA     Patient was previously seen in the Diabetes Empowerment program, last visit was in 12/2014, then 7/2016, and then was lost to f/u appts.     At his last visit, Invokana and Trulicity were started and his Lantus was decreased. However, since then he lost his job and his insurance and was off his medications for a prolonged period of time (unable to quantify how long but reports at least >6 months without lantus)      Today, he presents alone with no BG logs. Reports he recently started checking his BG and reports his BG readings are 100's-180s both fasting and before dinner, no readings >200 on current medication. Hospitalized 5/2017 for DKA after stopping his insulin regimen. Metformin stopped at this time due to pt complaint of diarrhea.       DIABETES COMPLICATIONS: retinopathy and peripheral neuropathy, chronic foot ulcers with right 5th toe amputation     CURRENT A1C:    Lab Results   Component Value Date    HGBA1C >14.0 (H) 06/28/2017      GOAL A1C: <7%    DIABETES MEDICATIONS AT THE START OF PROGRAM:  Metformin 1000 mg BID, Lantus 26 qPM (10-11pm), never started Victoza (price $400 per month from [employer] per patient report)  Denies missing doses of Metformin or Lantus    CURRENT DIABETES MEDICATIONS: Lantus 26 qPM (10-11pm), Alogliptin 12.5 mg daily   Missing dose of Lantus once weekly     SIGNIFICANT DIABETES MED HISTORY: none    HOSPITALIZATIONS FOR DIABETES OR RELATED COMPLICATIONS:  Yes - ~2006, steroid injection caused  "significant hyperglycemia, requiring hospitalization     BLOOD GLUCOSE MONITORING:  Checking BG twice daily, reports fasting all <173, pre-dinner reports all readings <150, denies any BG >200    HYPOGLYCEMIA:  none    OCCUPATION: working at [employer] in [employer]      CURRENT DAILY ROUTINE (meals/meds):    Eating 2 meals daily    Snacking in between meals on protein shakes    Drinking sugar free packets in water    CURRENT EXERCISE:  none    MEDICATIONS, ALLERGIES, LABS, VS's, MEDICAL, SURGICAL and SOCIAL HISTORY reviewed and updated in the appropriate sections during this encounter.    ROS:   Constitutional: Negative for weight change  Endocrine:  Denies polyuria, polydipsia, nocturia.  HENT: Negative for mouth sores or dental problems. Denies any personal or family history of thyroid cancer.  Denies neck swelling, lumps, horseness or trouble swallowing.   Eyes: Negative for visual disturbance.   Respiratory: Negative for cough or shortness or breath.  Cardiovascular: Negative for chest pain or claudication, + history of amputation right 5th toe  Gastrointestinal: Negative for nausea, vomiting, bloating.  No history of pancreatitis or gastroparesis. Denies any GI complaints   Genitourinary: Negative for urgency, frequency, frequent urinary tract infections.  Musculoskeletal: Negative for back pain.   Skin: + left foot ulcer, negative for rashes.  Neurological: Negative for syncope, gustatory sweating, +  numbness/burning of extremities.  Psychiatric/Behavioral: Negative for depression or anxiety  All other systems reviewed and are negative      CGMS interpretation:  none     PE:  Constitutional: BP (!) 140/90 (BP Location: Left arm, Patient Position: Sitting, BP Method: Medium (Manual))   Pulse 94   Ht 6' 1" (1.854 m)   Wt 99.8 kg (220 lb 0.3 oz)   BMI 29.03 kg/m²    Well developed, well nourished, NAD.  EMNT:   External ears, nose: no masses. No significant findings.   Hearing: normal   Lips, teeth and gums:  " Lips/oral mucosa normal, good dentition.  Neck:  No trachial deviation present, No neck masses noticed   Thyroid:  No thyromegaly present.  No thyroid tenderness.    No carotid bruits.  Cardiovascular:    Auscultation:  No murmurs or abnormal sounds   Lower extremities:  No edema, no cyanosis.  Respiratory:    Effort:  Normal, no use of accessory muscles.   Auscultation: breath sounds normal, no adventitious sounds.  Abdomen:     Exam:  Soft, non-tender, no masses.  Bowel sounds are normal. No hernia noted.  Skin:    Inspection: no rashes, lesion or ulcers, + acanthosis nigracans.   Palpation: no induration or nodules.   Insulin injection sites: no lipoatrophy or lipohypertrophy.  Psychiatric:  Judgement and insight good with normal mood and affect.  Musculoskeletal:  Gait steady. No gross abnormalities.  Neurological:  Cranial nerves grossly intact.     Protective Sensation (w/ 10 gram monofilament):  Right: Decreased  Left: Decreased    Visual Inspection:  Callus -  Bilateral heels    Pedal Pulses:   Right: Present  Left: Present    Posterior tibialis:   Right:Present  Left: Present     + amputation to right 5th toe/foot.  Significantly decreased protective sensation with 10 gram monofilament.    ______________________________________________________________________   Microalbumin/Creatinine ratio:  Lab Results   Component Value Date    MICALBCREAT 71.7 (H) 05/31/2017     ______________________________________________________________________     ASSESSMENT and PLAN:    1- Type 2 diabetes with neuropathy, retinopathy,  foot ulcer complications/amputation (last 1/2017)  - A1c markedly elevated in June, pt reports he stopped taking his insulin for months before labs. Reported BG readings much improved, will assess with CGMS in near future. Discussed tx options, including increasing Alogliptin versus GLP-1.  Continue Lantus 26 units nightly. D/c Alogliptin. Start trulicity 0.75 mg weekly. Discussed MOA, side effects,  including n/v/pancreatitis/medullary thyroid cancer. Instructed patient to notify me of any n/v/abdominal pain. Discussed proper dosing and administration.     Long discussion had with patient about need for better BG control, need for patient to be an active participant by checking his BG regularly and administering his medications as prescribed.     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 Also discussed with patient the importance of wearing appropriate footwear at all times, not to walk barefoot ever, and to check shoes before putting them on feet. Currently followed by podiatry, next f/u scheduled for Monday. Discussed with patient his loss of protective sensation, making him at high risk for recurrent foot ulcers - urged importance of checking feet daily and purchasing mirror to view all aspects of his feet.    3- Hypertension - goal < 140/80 mmHg -  At goal, continue current medications, on ACE-I   BP Readings from Last 3 Encounters:   08/11/17 (!) 140/90   07/06/17 (!) 154/90   06/20/17 123/79     4- Hyperlipidemia - above goal of LDL <100, LFTs WNL, to recheck lipid panel and CMP before next visit  Lab Results   Component Value Date    LDLCALC 102.8 06/28/2017      5. Noncompliance with current medication regimen - long discussion had with patient about need for patient's involvement in his DM care in order for treatment regimen to be successful. Will attempt to obtain  simplest treatment regimen possible in order to attempt to increase patient's compliance.     STANDARDS of CARE:  Statin: Yes  ACEI or ARB: yes   ASA:  Yes 81 mg daily   Last eye exam - 6/2017 + retinopathy

## 2017-08-11 NOTE — PATIENT INSTRUCTIONS
STOP Alogliptin    Continue Lantus 26 units nightly    Start trulicity 0.75 mg weekly.  Discussed MOA, side effects, including n/v/pancreatitis/medullary thyroid cancer. Instructed patient to notify me of any n/v/abdominal pain. Discussed proper dosing and administration.     Check your b lood sugar twice daily before breakfast and before dinner    Notify me of any blood sugar less than 70    Continuous Glucose Monitoring (CGM)  Your healthcare provider has put you on the Freestyle Omaira Pro Sensor system. At your next appointment, your CGM will be downloaded and reviewed so your healthcare provider can see your blood sugar trends and patterns. Your medication and diet may be adjusted based on this downloaded information.    What You Need To Do:   Wear the sensor on the back of your upper arm for the amount of time designated by the CGM clinic    You have no restrictions on your diet or activity.    Maintain a daily log of your blood glucose readings, diet, exercise, and dosing/timing of your diabetes medications.     Continue regular blood glucose self-testing per your providers recommendation using your own glucometer.    Important Things About Your CGM Test   If you have an MRI, a CT scan, or a diathermy treatment, you must remove your sensor prior to the procedure. If this occurs, notify your healthcare provider.      A Little Extra Care:   Showering, Bathing, and Swimming: The sensor is water-resistant and can be worn while bathing, showering, or swimming as long as you do not take it deeper than 3 feet or keep it underwater for more than 30 minutes at a time.   Getting dressed: Use care to avoid catching the sensor on clothing while getting dressed.   Exercising: Intensive exercise may cause the sensor to loosen due to sweat or movement of the sensor.     Day of Test (Inserting the CGM Sensor device)    The CGM Sensor device is placed on the top of your skin on the back of your upper arm      Throughout the Test    Continue regular blood glucose self-testing per your providers recommendation using your own glucometer.   Maintain a daily log of your blood glucose readings, diet, exercise, and dosing/timing of your diabetes medications.      Last Day of the Test (Removing the CGM Sensor device)    Loosen the adhesive attached to the CGM Sensor device.    Pull the CGM Sensor device away from your skin    Place the CGM Sensor device in the biohazard plastic bag   Return all items you were given on the first day of the test including log sheets and items you placed in the biohazard bag.    Bring items between 7:30 am and 9:00 am to the [facility] located at [address] [place] [address], [address] [place] [address].     When to Contact the Clinic   Call [phone] (Monday-Friday) or [phone] after hours if:    Your CGM Sensor falls off    You have pain, severe itching, or bleeding at the site     Revised: 01/2017      © 2015 [facility] ([email or web address]) is a non-profit, academic, multi-specialty, healthcare delivery system dedicated to patient care, research and education.     UNDERSTANDING CONTINUOUS GLUCOSE MONITORING     What is continuous glucose monitoring?   Continuous glucose monitoring system (CGMS) is a method used to record your blood sugar levels over a period of time (usually 5 days). Your home blood glucose monitoring is very helpful, but only measures blood glucose levels at various points in time and can miss dangerous high and low patterns, especially during the night while you sleep. Continuous glucose monitoring provides a continuous 24-hour measurement of your blood glucose levels.     Who benefits from continuous glucose monitoring?    People who experience dangerous high and low blood glucose readings.    People who suffer from hypoglycemia unawareness and cannot feel when their blood glucose is dropping.    People who  desire better blood glucose control.    People with elevated A1C levels.     How does continuous glucose monitoring work?   A glucose sensor is inserted on the top of your skin on the back of your arm. We do not leave a needle under your skin. The blood glucose sensor measures your blood sugar level every 15 minutes, 24 hours a day. At the end of the 5 days, the CGM Sensor is then downloaded and reviewed so your healthcare provider can see your blood sugar trends and patterns.     What are your responsibilities to ensure successful testing?   It is recommended that you monitor your blood readings as directed by your healthcare provider.  It is vital that you document the correct times of your medications, meals, snacks, blood sugar readings, and any exercise.       How do you prepare for the test?   There is nothing you need to do to prepare for the test. You do not need to fast (restrict food intake) the night before the test.     Revised: 01/2017    © 2015 [facility] [employer] [facility] ([email or web address]) is a non-profit, academic, multi-specialty, healthcare delivery system dedicated to patient care, research and education.

## 2017-09-12 ENCOUNTER — TELEPHONE (OUTPATIENT)
Dept: ENDOCRINOLOGY | Facility: CLINIC | Age: 47
End: 2017-09-12

## 2017-09-12 NOTE — TELEPHONE ENCOUNTER
----- Message from Chinedu Hammond sent at 9/12/2017  2:43 PM CDT -----  Contact: Patient  Patient called in to reschedule his appoinment that was currently on the 13th of September. Please have Dr. Schwab Nurse to give him a call back to reschedule. Thank You.

## 2017-09-12 NOTE — TELEPHONE ENCOUNTER
Called and spoke with patient regarding not being able to come in tomorrow for CGMS- wanted to reschedule for next Wednesday.  Done.  Patient is active in patient portal and will see in e-mail.

## 2017-09-20 ENCOUNTER — CLINICAL SUPPORT (OUTPATIENT)
Dept: ENDOCRINOLOGY | Facility: CLINIC | Age: 47
End: 2017-09-20
Payer: MEDICAID

## 2017-09-20 DIAGNOSIS — Z79.4 TYPE 2 DIABETES MELLITUS WITH DIABETIC NEUROPATHY, WITH LONG-TERM CURRENT USE OF INSULIN: Chronic | ICD-10-CM

## 2017-09-20 DIAGNOSIS — E11.40 TYPE 2 DIABETES MELLITUS WITH DIABETIC NEUROPATHY, WITH LONG-TERM CURRENT USE OF INSULIN: Chronic | ICD-10-CM

## 2017-09-20 NOTE — PROGRESS NOTES
"DIABETES EDUCATOR NOTE   PLACEMENT OF FREESTYLE MARIANGEL PRO SENSOR  CONTINOUS GLUCOSE MONITORING SYSTEM (CGMS)    Patient is here in clinic today for placement of continuous glucose monitoring sensor.      Each patient verified that they were here for CGMS procedure ordered by their provider and that they have a working glucose meter and supplies at home.   Patient will be provided with a Freestyle Mariangel Sensor and a copy of the Continuous Glucose Monitoring Patient Log to fill out during the study.   A detailed  explanation of Continuous Glucose Monitoring was  provided. Patient informed that this is a blind procedure and that they will not actually see the blood sugar tracing in real time.  Reviewed with patient the [employer] patient education handout called "Your Freestyle Mariangel Pro sensor: What you need to know" to review self-care during the study to avoid sensor loosening or removal ie... Bathing, Swimming, dressing, and exercising.   Instructed patient to check blood sugar using home glucometer and to record the following on provided patient log sheets:Blood sugar taken at home, Meals and snacks, Activity, and Diabetes medications taken and dosage    Patient was brought to a private location.  Arm for insertion was selected and prepared and allowed to dry. Glucose Sensor Serial Number [id number]  was inserted to back of patient's left upper arm.    The following forms  were given and reviewed in detail with patient and all questions answered.   · Continuous Glucose Monitoring Patient Log #[id number]  · [employer] Patient Handout "Your Freestyle Mariangel Pro Sensor: What you need to know"     Instructions held in a group: Time: 15 min   Insertion of sensor done individually in private:  Time: 5 minutes       "

## 2017-09-26 ENCOUNTER — CLINICAL SUPPORT (OUTPATIENT)
Dept: ENDOCRINOLOGY | Facility: CLINIC | Age: 47
End: 2017-09-26
Payer: MEDICAID

## 2017-09-26 DIAGNOSIS — Z79.4 TYPE 2 DIABETES MELLITUS WITH DIABETIC NEUROPATHY, WITH LONG-TERM CURRENT USE OF INSULIN: Chronic | ICD-10-CM

## 2017-09-26 DIAGNOSIS — E11.40 TYPE 2 DIABETES MELLITUS WITH DIABETIC NEUROPATHY, WITH LONG-TERM CURRENT USE OF INSULIN: Chronic | ICD-10-CM

## 2017-09-26 PROCEDURE — 95251 CONT GLUC MNTR ANALYSIS I&R: CPT | Mod: ,,, | Performed by: NURSE PRACTITIONER

## 2017-09-26 PROCEDURE — 95250 CONT GLUC MNTR PHYS/QHP EQP: CPT | Mod: PBBFAC | Performed by: DIETITIAN, REGISTERED

## 2017-09-27 NOTE — PROGRESS NOTES
DIABETES EDUCATOR NOTE   Return of the Freestyle Omaira Pro Sensor and Patient Log.    Patient returned to clinic today to return Glucose Sensor and signed patient log form used in CMGS procedure.    The CGMS Sensor will be scanned and downloaded. All reports will be imported into the patient's electronic medical record.    Endocrine Nurse Practitioner will complete data interpretation and make recommendations; will forward recommendations to the ordering provider for follow up with patient.

## 2017-09-28 ENCOUNTER — TELEPHONE (OUTPATIENT)
Dept: DIABETES | Facility: CLINIC | Age: 47
End: 2017-09-28

## 2017-09-28 DIAGNOSIS — E11.3292 TYPE 2 DIABETES MELLITUS WITH LEFT EYE AFFECTED BY MILD NONPROLIFERATIVE RETINOPATHY WITHOUT MACULAR EDEMA, WITHOUT LONG-TERM CURRENT USE OF INSULIN: Primary | Chronic | ICD-10-CM

## 2017-09-28 NOTE — TELEPHONE ENCOUNTER
Pt missed appt in Empowerment today to discuss CGMS results, medication changes. Please contact pt to reschedule Empowerment appt with CMP, lipid and A1c  And urine MAC before    Thanks

## 2017-10-06 ENCOUNTER — TELEPHONE (OUTPATIENT)
Dept: INTERNAL MEDICINE | Facility: CLINIC | Age: 47
End: 2017-10-06

## 2017-10-06 ENCOUNTER — LAB VISIT (OUTPATIENT)
Dept: LAB | Facility: HOSPITAL | Age: 47
End: 2017-10-06
Attending: INTERNAL MEDICINE
Payer: MEDICAID

## 2017-10-06 DIAGNOSIS — E78.2 MIXED HYPERLIPIDEMIA: ICD-10-CM

## 2017-10-06 DIAGNOSIS — E11.40 TYPE 2 DIABETES MELLITUS WITH DIABETIC NEUROPATHY, WITH LONG-TERM CURRENT USE OF INSULIN: Chronic | ICD-10-CM

## 2017-10-06 DIAGNOSIS — Z79.4 TYPE 2 DIABETES MELLITUS WITH DIABETIC NEUROPATHY, WITH LONG-TERM CURRENT USE OF INSULIN: Chronic | ICD-10-CM

## 2017-10-06 LAB
ALT SERPL W/O P-5'-P-CCNC: 24 U/L
ANION GAP SERPL CALC-SCNC: 10 MMOL/L
AST SERPL-CCNC: 25 U/L
BUN SERPL-MCNC: 12 MG/DL
CALCIUM SERPL-MCNC: 8.9 MG/DL
CHLORIDE SERPL-SCNC: 110 MMOL/L
CHOLEST SERPL-MCNC: 169 MG/DL
CHOLEST/HDLC SERPL: 3.1 {RATIO}
CO2 SERPL-SCNC: 24 MMOL/L
CREAT SERPL-MCNC: 0.9 MG/DL
EST. GFR  (AFRICAN AMERICAN): >60 ML/MIN/1.73 M^2
EST. GFR  (NON AFRICAN AMERICAN): >60 ML/MIN/1.73 M^2
ESTIMATED AVG GLUCOSE: 214 MG/DL
GLUCOSE SERPL-MCNC: 176 MG/DL
HBA1C MFR BLD HPLC: 9.1 %
HDLC SERPL-MCNC: 55 MG/DL
HDLC SERPL: 32.5 %
LDLC SERPL CALC-MCNC: 95 MG/DL
NONHDLC SERPL-MCNC: 114 MG/DL
POTASSIUM SERPL-SCNC: 3.7 MMOL/L
SODIUM SERPL-SCNC: 144 MMOL/L
TRIGL SERPL-MCNC: 95 MG/DL

## 2017-10-06 PROCEDURE — 83036 HEMOGLOBIN GLYCOSYLATED A1C: CPT

## 2017-10-06 PROCEDURE — 84450 TRANSFERASE (AST) (SGOT): CPT

## 2017-10-06 PROCEDURE — 80061 LIPID PANEL: CPT

## 2017-10-06 PROCEDURE — 36415 COLL VENOUS BLD VENIPUNCTURE: CPT

## 2017-10-06 PROCEDURE — 84460 ALANINE AMINO (ALT) (SGPT): CPT

## 2017-10-06 PROCEDURE — 80048 BASIC METABOLIC PNL TOTAL CA: CPT

## 2017-10-06 NOTE — TELEPHONE ENCOUNTER
----- Message from Nikki Bowens sent at 10/6/2017  8:29 AM CDT -----  Pt has a 2 pm appt on Monday with Dr Thakur wants to know if he can come in the morning.  Please call

## 2017-10-08 ENCOUNTER — TELEPHONE (OUTPATIENT)
Dept: INTERNAL MEDICINE | Facility: CLINIC | Age: 47
End: 2017-10-08

## 2017-10-09 NOTE — TELEPHONE ENCOUNTER
Pls call - aic 9 - too high.  Needs to see endocrine.  They should call him soon for an appt -     Liver, cholesterol ok

## 2017-10-11 ENCOUNTER — LAB VISIT (OUTPATIENT)
Dept: LAB | Facility: HOSPITAL | Age: 47
End: 2017-10-11
Payer: MEDICAID

## 2017-10-11 ENCOUNTER — PATIENT MESSAGE (OUTPATIENT)
Dept: DIABETES | Facility: CLINIC | Age: 47
End: 2017-10-11

## 2017-10-11 DIAGNOSIS — E11.3292 TYPE 2 DIABETES MELLITUS WITH LEFT EYE AFFECTED BY MILD NONPROLIFERATIVE RETINOPATHY WITHOUT MACULAR EDEMA, WITHOUT LONG-TERM CURRENT USE OF INSULIN: Chronic | ICD-10-CM

## 2017-10-11 LAB
ALBUMIN SERPL BCP-MCNC: 3.4 G/DL
ALP SERPL-CCNC: 108 U/L
ALT SERPL W/O P-5'-P-CCNC: 27 U/L
ANION GAP SERPL CALC-SCNC: 8 MMOL/L
AST SERPL-CCNC: 27 U/L
BILIRUB SERPL-MCNC: 0.4 MG/DL
BUN SERPL-MCNC: 11 MG/DL
CALCIUM SERPL-MCNC: 9.4 MG/DL
CHLORIDE SERPL-SCNC: 107 MMOL/L
CHOLEST SERPL-MCNC: 186 MG/DL
CHOLEST/HDLC SERPL: 2.7 {RATIO}
CO2 SERPL-SCNC: 27 MMOL/L
CREAT SERPL-MCNC: 0.9 MG/DL
EST. GFR  (AFRICAN AMERICAN): >60 ML/MIN/1.73 M^2
EST. GFR  (NON AFRICAN AMERICAN): >60 ML/MIN/1.73 M^2
GLUCOSE SERPL-MCNC: 215 MG/DL
HDLC SERPL-MCNC: 68 MG/DL
HDLC SERPL: 36.6 %
LDLC SERPL CALC-MCNC: 103.4 MG/DL
NONHDLC SERPL-MCNC: 118 MG/DL
POTASSIUM SERPL-SCNC: 3.8 MMOL/L
PROT SERPL-MCNC: 7.3 G/DL
SODIUM SERPL-SCNC: 142 MMOL/L
TRIGL SERPL-MCNC: 73 MG/DL

## 2017-10-11 PROCEDURE — 83036 HEMOGLOBIN GLYCOSYLATED A1C: CPT

## 2017-10-11 PROCEDURE — 80053 COMPREHEN METABOLIC PANEL: CPT

## 2017-10-11 PROCEDURE — 80061 LIPID PANEL: CPT

## 2017-10-11 PROCEDURE — 36415 COLL VENOUS BLD VENIPUNCTURE: CPT

## 2017-10-12 ENCOUNTER — PATIENT MESSAGE (OUTPATIENT)
Dept: DIABETES | Facility: CLINIC | Age: 47
End: 2017-10-12

## 2017-10-12 LAB
ESTIMATED AVG GLUCOSE: 226 MG/DL
HBA1C MFR BLD HPLC: 9.5 %

## 2017-10-13 ENCOUNTER — PATIENT MESSAGE (OUTPATIENT)
Dept: DIABETES | Facility: CLINIC | Age: 47
End: 2017-10-13

## 2017-10-23 ENCOUNTER — OFFICE VISIT (OUTPATIENT)
Dept: INTERNAL MEDICINE | Facility: CLINIC | Age: 47
End: 2017-10-23
Attending: FAMILY MEDICINE
Payer: MEDICAID

## 2017-10-23 VITALS
SYSTOLIC BLOOD PRESSURE: 168 MMHG | BODY MASS INDEX: 30.69 KG/M2 | WEIGHT: 231.56 LBS | DIASTOLIC BLOOD PRESSURE: 92 MMHG | HEIGHT: 73 IN | OXYGEN SATURATION: 97 % | TEMPERATURE: 98 F | HEART RATE: 86 BPM

## 2017-10-23 DIAGNOSIS — M75.112 INCOMPLETE TEAR OF LEFT ROTATOR CUFF: ICD-10-CM

## 2017-10-23 DIAGNOSIS — I10 ESSENTIAL HYPERTENSION: Chronic | ICD-10-CM

## 2017-10-23 DIAGNOSIS — M54.2 NECK PAIN: Primary | ICD-10-CM

## 2017-10-23 PROCEDURE — 99214 OFFICE O/P EST MOD 30 MIN: CPT | Mod: PBBFAC,PO | Performed by: INTERNAL MEDICINE

## 2017-10-23 PROCEDURE — 99999 PR PBB SHADOW E&M-EST. PATIENT-LVL IV: CPT | Mod: PBBFAC,,, | Performed by: INTERNAL MEDICINE

## 2017-10-23 PROCEDURE — 99214 OFFICE O/P EST MOD 30 MIN: CPT | Mod: S$PBB,,, | Performed by: INTERNAL MEDICINE

## 2017-10-23 RX ORDER — TIZANIDINE 4 MG/1
4 TABLET ORAL 2 TIMES DAILY PRN
Qty: 30 TABLET | Refills: 1 | Status: SHIPPED | OUTPATIENT
Start: 2017-10-23 | End: 2019-07-12

## 2017-10-23 NOTE — PROGRESS NOTES
Subjective:       Patient ID: Indio Ryder Jr. is a 47 y.o. male.    Chief Complaint: Shoulder Pain (left shoulder); Neck Pain; and Arm Pain (Left arm)    Here for neck and shoulder pains.  Throbbing in L shoulder/neck 3 weeks now. No inj. Worsening. Tingling down fingers, aleve use little relief. Able to work still despite pain but diff lifting heavy. No previous neck issues.      Pressure up, but has not been adherent to lisinopril (side effects ? Dizziness).      Review of Systems   Constitutional: Negative for activity change and fatigue.   HENT: Negative for congestion.    Respiratory: Negative for chest tightness and shortness of breath.    Musculoskeletal: Positive for neck pain. Negative for arthralgias, back pain, gait problem and joint swelling.   Skin: Negative for rash.   Neurological: Negative for speech difficulty, weakness and headaches.   Psychiatric/Behavioral: Negative for confusion.       Objective:      Physical Exam   Constitutional: He is oriented to person, place, and time. He appears well-developed and well-nourished. No distress.   HENT:   Head: Normocephalic and atraumatic.   Mouth/Throat: No oropharyngeal exudate.   Musculoskeletal:   Unable to abduct the L arm, weakness of the rotator cuff.  Pain and weakness with open can testing.    Neck normal rom but painful extension.  Spurling test + pain but no radiculopathy with it.    No midline C spine tenderness to deep palpation, but there is paraspinal tenderness to deep palpation on the L    Decreased UE reflexes, normal LE.     Neurological: He is alert and oriented to person, place, and time. No sensory deficit. He exhibits abnormal muscle tone.   Normal hand  strength and muscle tone hands on fingers L   Skin: No rash noted. He is not diaphoretic.       Assessment:       1. Neck pain    2. Incomplete tear of left rotator cuff        Plan:       Indio was seen today for shoulder pain, neck pain and arm pain.    Diagnoses and all  orders for this visit:    He did not note on hx of weakness of the L shoulder abduction. But clearly he is weak on exam.  I am not sure if this is 2 issues -- rotator cuff tear on L and neck muscle spasms or primary C spine issue. He does not have symptoms of clearly myelopathy.  prox shoulder weakness more c/w tendon tear than C spine myelopathy.    Neck pain  -     Ambulatory consult to Physical Therapy  -     X-Ray Cervical Spine AP And Lateral; Future  -     tizanidine (ZANAFLEX) 4 MG tablet; Take 1 tablet (4 mg total) by mouth 2 (two) times daily as needed (muscle aches).    Incomplete tear of left rotator cuff  -     Ambulatory consult to Physical Therapy  -     X-Ray Shoulder Trauma 3 view Left; Future  -     Ambulatory Referral to Orthopedics    Lengthy discussion re importance of taking BP med daily.    cc Lorena Thakur MD, keep upcoming f/u with Dr. Thakur

## 2017-10-23 NOTE — PATIENT INSTRUCTIONS
Exercises for Shoulder Flexibility: Internal Rotation    This stretch can help restore shoulder flexibility and relieve pain over time. When stretching, be sure to breathe deeply. And follow any special instructions from your doctor or physical therapist.  · While seated, move the arm on the side you want to stretch toward the middle of your back. The palm of your hand should face out.  · Cup your other hand under the hand thats behind your back. Gently push your cupped hand upward until you feel the stretch in the shoulder. Try to hold the stretch for 5 seconds.  · Work up to doing 3 sets of this stretch, 3 times a day. Work up to holding the stretch for 30-60 seconds.  Note: Keep your back straight. Its okay if your hand cant reach the middle of your back. Instead, start the stretch with your hand as close as you can get it to the middle of your back.     Frozen Shoulder  Frozen shoulder is another name for adhesive capsulitis, which causes restricted movement in the shoulder. If you have frozen shoulder, this stretch may cause discomfort, especially when you first get started. A few months may pass before you achieve the results you want. But once your shoulder heals, it almost never becomes frozen again. So stick to your stretching program. If you have any questions, be sure to ask your doctor.   © 7969-8643 [employer]. [address] [place] [address], [place] [address]. All rights reserved. This information is not intended as a substitute for professional medical care. Always follow your healthcare professional's instructions.        Exercises for Shoulder Flexibility: Wall Walk  Improving your flexibility can reduce pain. Stretching exercises also can help increase your range of pain-free motion. Breathe normally when you exercise. And try to use smooth, fluid movements.  Note: Follow any special instructions you are given. If you feel pain, stop the exercise. If the pain continues after stopping,  call your healthcare provider.  · Stand with your shoulder about 2 feet from the wall.  · Raise your arm to shoulder level and gently walk your fingers up the wall as high as you can.  · Hold for a few seconds. Then walk your fingers back down.  · Repeat 3 times. Move closer to the wall as you repeat.  · Build up to holding each stretch for 30 seconds.  CAUTION: Do this stretch only if your healthcare provider recommends it. Dont do it when you are first injured.          © 5742-5270 [employer]. [address] [place] [address]. All rights reserved. This information is not intended as a substitute for professional medical care. Always follow your healthcare professional's instructions.        Exercises for Shoulder Flexibility: Pendulum Exercise   Improving your flexibility can reduce pain. Stretching exercises also can help increase your range of pain-free motion. Breathe normally when you exercise. And try to use smooth, fluid movements.  Follow any special instructions you are given. If you feel pain, stop the exercise. If the pain continues after stopping, call your health care provider.  · Lean over with your good arm supported on a table or chair.  · Relax the arm on the painful side, letting it hang straight down.  · Slowly begin to swing the relaxed arm. Move it in a small [place], gradually making it bigger if you can. Then reverse the direction. Next, move it backward and forward. Finally, move it side to side.     Note: Spend about 5 minutes doing the exercise, 3 times a day. Change direction after 1 minute of motion.   © 2033-2271 [employer]. [address] [place] [address]. All rights reserved. This information is not intended as a substitute for professional medical care. Always follow your healthcare professional's instructions.        Exercises for Shoulder Flexibility: Broom Stretch    Improving your flexibility can reduce pain. Stretching exercises  also can help increase your range of pain-free motion. Breathe normally when you exercise. Try to use smooth, fluid movements. Never force a stretch.  · Stand up or lie on the floor. Place the palm of your hand over the end of a broomstick or cane. Grasp the stick farther down with the other hand, palm facing down.  · Push the end of the stick up on the side of your injured shoulder as high as is comfortable.  · Hold for a few seconds. Return to the starting position.  · Repeat 3-5 times. Build up to holding each stretch for 10-30  seconds.  Note: Follow any special instructions you are given. If you feel pain, stop the exercise. If the pain continues after stopping, call your healthcare provider.   © 0136-9328 [employer]. [address] [place] [address]. All rights reserved. This information is not intended as a substitute for professional medical care. Always follow your healthcare professional's instructions.      Neck Exercises: Active Neck Rotation    To start, lie on your back, knees bent and feet flat on the floor. Keep your ears, shoulders, and hips aligned, but dont press your lower back to the floor. Rest your hands on your pelvis. Breathe deeply and relax.              · Use your neck muscles to turn your head to one side until you feel a stretch in the muscles.  · Hold for 5 seconds. Then turn to the other side.  · Repeat 5 times on each side.  Note: Keep your shoulders on the floor. Dont lift or tuck your chin as you turn your head.  © 9768-6377 [employer]. [address] [place] [address]. All rights reserved. This information is not intended as a substitute for professional medical care. Always follow your healthcare professional's instructions.        Neck Exercises: Neck Flex                            To start, sit in a chair with your feet flat on the floor. Your weight should be slightly forward so that youre balanced evenly on your buttocks.  Relax your shoulders and keep your head level. Avoid arching your back or rounding your shoulders. Using a chair with arms may help you keep your balance:  · Rest the back of your left hand against your lower back. Place your right palm on the top of your head.  · Gently pull your head forward and down until you feel a stretch in the muscles in the back of your neck. Dont force the motion.  · Hold for 20 seconds, then return to starting position. Switch arms.  © 7663-8522 [employer]. [address] [place] [address]. All rights reserved. This information is not intended as a substitute for professional medical care. Always follow your healthcare professional's instructions.        Neck Exercises: Passive Neck Rotation        To start, lie on your back, knees bent and feet flat on the floor. Keep your ears, shoulders, and hips aligned, but dont press your lower back to the floor. Rest your hands on your pelvis. Breathe deeply and relax.  · With your neck relaxed, place the palm of one hand on your forehead. Use your hand to turn your head to one side until you feel a stretch in the neck muscles. Do not push through pain.  · Hold for 5 seconds. Then turn to the other side.  · Repeat 5 times on each side.   Note: Keep your shoulders on the floor. Dont lift your chin as you turn your head.  © 1218-1166 [employer]. [address] [place] [address]. All rights reserved. This information is not intended as a substitute for professional medical care. Always follow your healthcare professional's instructions.

## 2017-10-23 NOTE — MEDICAL/APP STUDENT
Subjective:       Patient ID: Indio Ryder Jr. is a 47 y.o. male. Right handed.    Chief Complaint: Shoulder Pain (left shoulder); Neck Pain; and Arm Pain (Left arm)    HPI   Mr. Ryder presents today with left shoulder and left neck pain. He states that the symptoms came on 3 weeks ago but cannot attribute it to anything.  States that the symptoms have progressively gotten worse during that time too.  He says that his neck is a 10/10 pain.  He is unable to move his left shoulder more than a tiny bit and not above his head.  He has tried aleve without any success.   He has not had any problems at work and never had any problems like this before.    Has elevated BP today and has not been taking his lisinopril as recommended.  States that he has a lot of SE from it that include dizziness and drowsiness.    Past Medical History:   Diagnosis Date    Actinomyces infection 1/17/2017    Right foot    Diabetic ketoacidosis without coma associated with type 2 diabetes mellitus 5/30/2017    Diabetic ulcer of right foot associated with type 2 diabetes mellitus 6/3/2015    Essential hypertension 6/6/2013    Group B streptococcal infection 1/13/2017    Mixed hyperlipidemia 8/12/2014    Shoulder impingement 8/12/2014    Subacute osteomyelitis of right foot 1/12/2017    Streptococcus agalactiae, Actinomyces odontolyticus    Traumatic amputation of fifth toe of right foot 7/2/2015    Type 2 diabetes mellitus with diabetic neuropathy, with long-term current use of insulin 5/3/2016    Ulcerative colitis, unspecified      Past Surgical History:   Procedure Laterality Date    TOE AMPUTATION Right 06/05/2015    TOE AMPUTATION Right 01/19/2017     Social History     Social History    Marital status: Single     Spouse name: N/A    Number of children: 0    Years of education: N/A     Occupational History     [employer]     Social History Main Topics    Smoking status: Never Smoker    Smokeless tobacco: Never  "Used    Alcohol use No    Drug use: No    Sexual activity: Yes     Partners: Female     Birth control/ protection: Condom     Other Topics Concern    Not on file     Social History Narrative    No narrative on file     Family History   Problem Relation Age of Onset    Adopted: Yes    Hypertension Mother     Cancer Mother      breast    Diabetes Mother     Hypertension Father     Cataracts Father     Diabetes Sister     No Known Problems Brother     No Known Problems Sister     No Known Problems Sister     Hypertension Maternal Aunt     No Known Problems Maternal Uncle     No Known Problems Paternal Aunt     No Known Problems Paternal Uncle     No Known Problems Maternal Grandmother     No Known Problems Maternal Grandfather     No Known Problems Paternal Grandmother     No Known Problems Paternal Grandfather     Amblyopia Neg Hx     Blindness Neg Hx     Glaucoma Neg Hx     Macular degeneration Neg Hx     Retinal detachment Neg Hx     Strabismus Neg Hx     Stroke Neg Hx     Thyroid disease Neg Hx      Current Outpatient Prescriptions on File Prior to Visit   Medication Sig Dispense Refill    aspirin (ECOTRIN) 81 MG EC tablet Take 1 tablet (81 mg total) by mouth once daily. 11 am  0    atorvastatin (LIPITOR) 80 MG tablet 1 tab po q day 90 tablet 3    BD ULTRA-FINE SASCHA PEN NEEDLES 32 gauge x 5/32" Ndle Uses 1x daily, on daily insulin injections 100 each 3    blood sugar diagnostic Strp 1 strip by Misc.(Non-Drug; Combo Route) route once daily. 300 strip 3    dulaglutide (TRULICITY) 0.75 mg/0.5 mL PnIj Inject 0.5 mLs (0.75 mg total) into the skin every 7 days. 4 Syringe 1    insulin glargine (LANTUS SOLOSTAR) 100 unit/mL (3 mL) InPn pen Inject 26 Units into the skin every evening. 11 PM 1 Box 3    lancets (ACCU-CHEK SOFTCLIX LANCETS) Misc 1 Device by Misc.(Non-Drug; Combo Route) route once daily. 300 each 3    lisinopril 10 MG tablet Take 1 tablet (10 mg total) by mouth once daily. " 90 tablet 3    ONETOUCH DELICA LANCETS 33 gauge Misc       ONETOUCH ULTRA2 kit        No current facility-administered medications on file prior to visit.            Review of Systems   Constitutional: Negative for activity change, appetite change, chills and diaphoresis.   HENT: Negative for congestion, dental problem, mouth sores and nosebleeds.    Eyes: Negative for pain, discharge, redness and itching.   Respiratory: Negative for apnea, cough, choking and chest tightness.    Cardiovascular: Negative for chest pain and leg swelling.   Gastrointestinal: Negative for abdominal distention and abdominal pain.   Endocrine: Positive for cold intolerance. Negative for heat intolerance.   Genitourinary: Negative for decreased urine volume and urgency.   Musculoskeletal: Positive for neck pain. Negative for arthralgias, back pain, gait problem, joint swelling and neck stiffness.   Skin: Negative for color change and pallor.   Allergic/Immunologic: Negative for environmental allergies and food allergies.   Neurological: Positive for dizziness and weakness. Negative for facial asymmetry, numbness and headaches.   Hematological: Negative for adenopathy. Does not bruise/bleed easily.   Psychiatric/Behavioral: Negative for agitation, behavioral problems and confusion.       Objective:       Vitals:    10/23/17 0918   BP: (!) 168/92   Pulse: 86   Temp: 97.7 °F (36.5 °C)     Physical Exam      Gen: well developed well nourished male who is not in any distress.  HEENT: PERTL; No lymphadenopathy  CVS: 2+ pulses; normal S1 and S2 no murmurs rubs or gallops.  Resp: lungs clear with normal breath sounds.  Abdo: soft non-tender not distended. Bowel sounds present.  MSK:  No visible L shoulder or neck deformity;  Neck full ROM but pain with extension.  L shoulder no abduction past 20 degrees. 45 adduction; 45 extension, 45 degrees flexion.  Crepitus with passive movement.   Neuro: 4/5 strength in flexors and extensors; 2+reflexes; no  sensory loss.      Assessment:     Mr. Ryder is 48 yo M with L rotator tear vs tendinitis.    1. Neck pain    2. Incomplete tear of left rotator cuff    3. Essential hypertension        Plan:       1. Shoulder pain:  Likely due to rotator cuff tear. Plan to refer to ortho.  2 view xray of shoulder and neck.  Tizanidine 4mg for neck pain.  PT at [facility].    2. Declined Flu vax today.    3. BP: plan to work with PCP about medication use.    4. Follow up with normal PCP in 1 month.

## 2017-10-24 ENCOUNTER — OFFICE VISIT (OUTPATIENT)
Dept: INTERNAL MEDICINE | Facility: CLINIC | Age: 47
End: 2017-10-24
Payer: MEDICAID

## 2017-10-24 ENCOUNTER — HOSPITAL ENCOUNTER (OUTPATIENT)
Dept: RADIOLOGY | Facility: HOSPITAL | Age: 47
Discharge: HOME | End: 2017-10-24
Attending: INTERNAL MEDICINE
Payer: MEDICAID

## 2017-10-24 VITALS
BODY MASS INDEX: 30.94 KG/M2 | WEIGHT: 233.44 LBS | SYSTOLIC BLOOD PRESSURE: 172 MMHG | OXYGEN SATURATION: 99 % | HEART RATE: 92 BPM | HEIGHT: 73 IN | DIASTOLIC BLOOD PRESSURE: 100 MMHG

## 2017-10-24 DIAGNOSIS — M75.112 INCOMPLETE TEAR OF LEFT ROTATOR CUFF: ICD-10-CM

## 2017-10-24 DIAGNOSIS — M54.2 NECK PAIN: Primary | ICD-10-CM

## 2017-10-24 DIAGNOSIS — M54.2 NECK PAIN: ICD-10-CM

## 2017-10-24 PROCEDURE — 99214 OFFICE O/P EST MOD 30 MIN: CPT | Mod: PBBFAC,25 | Performed by: STUDENT IN AN ORGANIZED HEALTH CARE EDUCATION/TRAINING PROGRAM

## 2017-10-24 PROCEDURE — 73030 X-RAY EXAM OF SHOULDER: CPT | Mod: TC,LT

## 2017-10-24 PROCEDURE — 73030 X-RAY EXAM OF SHOULDER: CPT | Mod: 26,LT,, | Performed by: RADIOLOGY

## 2017-10-24 PROCEDURE — 99213 OFFICE O/P EST LOW 20 MIN: CPT | Mod: S$PBB,,, | Performed by: STUDENT IN AN ORGANIZED HEALTH CARE EDUCATION/TRAINING PROGRAM

## 2017-10-24 PROCEDURE — 99999 PR PBB SHADOW E&M-EST. PATIENT-LVL IV: CPT | Mod: PBBFAC,,, | Performed by: STUDENT IN AN ORGANIZED HEALTH CARE EDUCATION/TRAINING PROGRAM

## 2017-10-24 PROCEDURE — 72040 X-RAY EXAM NECK SPINE 2-3 VW: CPT | Mod: TC

## 2017-10-24 PROCEDURE — 72040 X-RAY EXAM NECK SPINE 2-3 VW: CPT | Mod: 26,,, | Performed by: RADIOLOGY

## 2017-10-24 RX ORDER — TRAMADOL HYDROCHLORIDE 50 MG/1
50 TABLET ORAL EVERY 12 HOURS PRN
Qty: 30 TABLET | Refills: 0 | Status: SHIPPED | OUTPATIENT
Start: 2017-10-24 | End: 2017-11-03

## 2017-10-24 NOTE — PROGRESS NOTES
"Subjective:       Patient ID: Indio Ryder Jr. is a 47 y.o. male.    Chief Complaint: Shoulder Pain (left side) and Neck Pain (left side)    HPI   This is Mr. Indio Ryder Jr., 47 y.o. was seen in primary care clinic yesterday for left shoulder pain. Was prescribed Zanaflex 4 mg po BID PRN. Cervical x-ray showed mild arthritis. Pt came to clinic because pain still there and not improved after couple dose of Zanaflex. his symptoms started 3 weeks ago but cannot attribute it to anything.  States that the symptoms have progressively gotten worse during that time too.  He says that his neck is a 10/10 pain.  He is unable to move his left shoulder more than a tiny bit and not above his head.  He has tried aleve without any success.   He has not had any problems at work and never had any problems like this before.     Review of Systems    Constitutional: denies unintentional weight loss, night sweats, fever or chills  Eyes: denies visual changes  ENT: denies nasal congestion, ear pain or sore throat  Respiratory: denies cough, dyspnea on exertion and pleurisy  Cardiovascular: denies chest pain or dyspnea.  Gastrointestinal: denies nausea or vomiting, abdominal pain or change in bowel habits  Genitourinary: denies hematuria or dysuria  Musculoskeletal: + left shoulder pain and neck pain.   Neurological: denies seizures or tremors  Behavioral/Psych: denies auditory or visual hallucinations, SI, HI     Objective:  Vitals:    10/24/17 1305   BP: (!) 172/100   BP Location: Left arm   Patient Position: Sitting   BP Method: Medium (Manual)   Pulse: 92   SpO2: 99%   Weight: 105.9 kg (233 lb 7.5 oz)   Height: 6' 1" (1.854 m)           Physical Exam    General: Well developed, well nourished male in NAD  HEENT: Conjunctiva clear; Oropharynx clear  Neck: No JVD noted, Supple  CV: Normal S1, S2 with no murmurs,gallops,rubs  Resp: Lungs CTA Bilaterally, Unlabored  Abdomen: NTND, BS normoactive x4 quads, " soft  Musculoskeletal:  -- Unable to abduct the L arm, weakness of the rotator cuff. Normal ROM in right UE   -- Neck normal rom but painful extension.  -- midline C spine tenderness to deep palpation, but there is paraspinal tenderness to deep palpation on the L  Skin: No rashes, lesions, ulcers  Neuro: motor strength in tact. No focal deficit   PSYCH: Oriented x3    Assessment:       1. Neck pain        Plan:       Indio was seen today for shoulder pain and neck pain.    Diagnoses and all orders for this visit:    Neck pain  -     traMADol (ULTRAM) 50 mg tablet; Take 1 tablet (50 mg total) by mouth every 12 (twelve) hours as needed for Pain.  -     Ambulatory consult to Physical Therapy ordered yesterday. Advised pt. To call back if did not receive a call from physical therapy   -     X-Ray Cervical Spine: showed mild arthritis   -    Continue tizanidine (ZANAFLEX) 4 MG tablet; Take 1 tablet (4 mg total) by mouth 2 (two) times daily as needed (muscle aches).         Case discussed with dr. Harman Chicas M.D.  Internal Medicine, PGY-2  [phone]

## 2017-10-30 ENCOUNTER — TELEPHONE (OUTPATIENT)
Dept: ENDOCRINOLOGY | Facility: CLINIC | Age: 47
End: 2017-10-30

## 2017-10-30 NOTE — TELEPHONE ENCOUNTER
Noted unread patient portal message from 10/13/17 Clare Jackson NP- letter sent to patient's home address on file.

## 2017-11-08 ENCOUNTER — CLINICAL SUPPORT (OUTPATIENT)
Dept: REHABILITATION | Facility: HOSPITAL | Age: 47
End: 2017-11-08
Attending: INTERNAL MEDICINE
Payer: MEDICAID

## 2017-11-08 DIAGNOSIS — M25.512 ACUTE PAIN OF LEFT SHOULDER: ICD-10-CM

## 2017-11-08 DIAGNOSIS — M54.2 NECK PAIN: Primary | ICD-10-CM

## 2017-11-08 PROCEDURE — 97161 PT EVAL LOW COMPLEX 20 MIN: CPT

## 2017-11-08 PROCEDURE — 97110 THERAPEUTIC EXERCISES: CPT

## 2017-11-08 NOTE — PROGRESS NOTES
"Physical Therapy Evaluation    Name: Indio Ryder Jr.  Clinic Number: [id number]    Diagnosis:   Encounter Diagnoses   Name Primary?    Neck pain Yes    Acute pain of left shoulder      Physician: Cj Hammer MD  Treatment Orders: PT Eval and Treat    Past Medical History:   Diagnosis Date    Actinomyces infection 1/17/2017    Right foot    Diabetic ketoacidosis without coma associated with type 2 diabetes mellitus 5/30/2017    Diabetic ulcer of right foot associated with type 2 diabetes mellitus 6/3/2015    Essential hypertension 6/6/2013    Group B streptococcal infection 1/13/2017    Mixed hyperlipidemia 8/12/2014    Shoulder impingement 8/12/2014    Subacute osteomyelitis of right foot 1/12/2017    Streptococcus agalactiae, Actinomyces odontolyticus    Traumatic amputation of fifth toe of right foot 7/2/2015    Type 2 diabetes mellitus with diabetic neuropathy, with long-term current use of insulin 5/3/2016    Ulcerative colitis, unspecified      Current Outpatient Prescriptions   Medication Sig    aspirin (ECOTRIN) 81 MG EC tablet Take 1 tablet (81 mg total) by mouth once daily. 11 am    atorvastatin (LIPITOR) 80 MG tablet 1 tab po q day    BD ULTRA-FINE SASCHA PEN NEEDLES 32 gauge x 5/32" Ndle Uses 1x daily, on daily insulin injections    blood sugar diagnostic Strp 1 strip by Misc.(Non-Drug; Combo Route) route once daily.    dulaglutide (TRULICITY) 0.75 mg/0.5 mL PnIj Inject 0.5 mLs (0.75 mg total) into the skin every 7 days.    insulin glargine (LANTUS SOLOSTAR) 100 unit/mL (3 mL) InPn pen Inject 26 Units into the skin every evening. 11 PM    lancets (ACCU-CHEK SOFTCLIX LANCETS) Misc 1 Device by Misc.(Non-Drug; Combo Route) route once daily.    lisinopril 10 MG tablet Take 1 tablet (10 mg total) by mouth once daily.    ONETOUCH DELICA LANCETS 33 gauge Misc     ONETOUCH ULTRA2 kit     tizanidine (ZANAFLEX) 4 MG tablet Take 1 tablet (4 mg total) by mouth 2 (two) times daily as " needed (muscle aches).     No current facility-administered medications for this visit.      Review of patient's allergies indicates:  No Known Allergies  Precautions: none    Evaluation Date: 11/8/2017  Visit # authorized: 1/1  Authorization period: -12/31/2017  Plan of Care Expiration: 12/8/2017    Subjective     Onset/ZANDER: gradual    Primary concern/ Chief complaints:    Indio is a 47 y.o. male with PMH of shoulder pain, peripheral neuropathy, and DM that presents to [facility] secondary to left sided neck and shoulder pain.  Injury/surgery occurred on 1 month ago.  Reports that no particular event or injury caused his pain to start, and that it has been progressively getting worse.    X-ray was taken and revealed:     Cervical x-ray: Reversal of the normal cervical lordosis. Cervical spondylosis.    Left shoulder x-ray: No evidence for acute fracture, bone destruction, or dislocation.    Previous treatment included exercise, heat, and muscle relaxers, pain medication with little help. Pt reports that lifting the left arm increases left arm and neck pain and reports neck and arm pain at worst is a 10 on the VAS. Reports that his neck and arm pain is about even. Prolonged positioning causes more pain than singular movements. Pt uses exercise and heat to control left arm pain symptoms. Pt has a decrease ability to perform ADLs such as lifting, reaching. Pt works as a  at [employer] and job related duties include walking, standing, carrying light objects. Pt reports constant numbness and tingling in both hands and up the left arm. Pt has also tried some stretching exercises from [place] and reports no significant change. No cultural, environmental, or spiritual barriers identified to treatment or learning.    Pain Scale: Indio rates pain on a scale of 0-10 to be 10 at worst; 5 currently; 3 at best .    Patient Goals: Pt would like to decrease pain and increase function so he can  return to pain-free completion of all normal daily activities.      Objective     Observation:     Posture: rounded shoulder posture, mild    Cervical Range of Motion:    Degrees Pain   Flexion 50 Stretching in paraspinals     Extension 45 No pain     Right Rotation 55 No pain     Left Rotation 55 No pain     Right Side Bending 40 No pain   Left Side Bending 40  No pain      Shoulder Active Range of Motion:   Shoulder Left Right   Flexion 110 WNL   Abduction 30 WNL   ER 50% WNL   IR WNL WNL     Shoulder Passive Range of Motion:   Shoulder Left Right   Flexion 130 WNL   Abduction 90 WNL   ER 50% WNL   IR WNL WNL     *able to flex left shoulder to 120 in supine  *able to abduct left shoulder to 90 in supine    Strength:  Cervical MMT   Flexion 4   Extension 4   Right Side Bend 4   Left Side Bend 4     Upper Extremity Strength  (R) UE  (L) UE    Shoulder flexion: 4+/5 Shoulder flexion: 3/5   Shoulder Abduction: 4+/5 Shoulder abduction: 3/5   Shoulder ER 4+/5 Shoulder ER 3+/5   Shoulder IR 4+/5 Shoulder IR 4+/5   Elbow flexion: 4+/5 Elbow flexion: 4+/5   Elbow extension: 4+/5 Elbow extension: 4+/5   Wrist flexion: 5/5 Wrist flexion: 5/5   Wrist extension: 5/5 Wrist extension: 5/5    5/5 : 5/5   Lower Trap 5/5 Lower Trap 3/5   Middle Trap 5/5 Middle Trap 4-/5   Rhomboids 4+/5 Rhomboids 4-/5     Joint Mobility: WNL    Palpation: ttp to L acromion      Sensation: WNL    Flexibility: decreased into L shoulder flexion, abduction, external rotation    G-code Reporting:  Category: mobility (Mobility, Self-Care, etc. )  Tool: FOTO neck  Score: 51% impairment  Goal:   ( 20%-40% impairment)  Current:  CK%-40% impairment)      PT Evaluation Completed? Yes  Discussed Plan of Care with patient: Yes    TREATMENT:  Indio received therapeutic exercises to develop strength and endurance, flexibility for 15 minutes including: isometric external rotation, flexion table slides, abduction table slides. (HEP code =  [id number])     Instructed pt. Regarding: Proper technique with all exercises. Pt demo good understanding of the education provided. Indio demonstrated good return demonstration of activities.      Assessment     This is a 47 y.o. male referred to outpatient physical therapy and presents with a medical diagnosis of neck pain and demonstrates limitations as described in the problem list. Pt will benefit from physical therapy services in order to maximize pain free functional independence. The following goals were discussed with the patient and patient is in agreement with them as to be addressed in the treatment plan. Pt was given a HEP consisting of  isometric external rotation, flexion table slides, abduction table slides. (HEP code = [id number]). Pt verbally understood the instructions as they were given and demonstrated proper form and technique during therapy. Pt was advised to perform these exercises free of pain, and to stop performing them if pain occurs.     Pt presents with decreased range of motion into shoulder flexion, shoulder abduction, and left shoulder external rotation, and would benefit from stretching of shoulder girdle musculature in order to decrease muscle guarding and pain as well as to allow for increases of range of motion into above directions. To decrease stresses on the cervical spine with completion of ADLs, pt would benefit from strengthening of periscapular musculature and stretching of pectorals as tolerated for postural re-education. Pt would benefit from postural reeducation and periscapular strengthening to both decrease stresses on rotator cuff musculature and to decrease pain in the cervical spine.    Patient History Examination Clinical Presentation Clinical Decision Making   Comorbidities:  arthritis    Personal Factors:  none       Activity and Participation Restriction:  Mobility  General tasks and demands    Body Systems:  musculoskeletal    Body Regions:  Upper  extremity  Neck   Stable and uncomplicated     Low            Medical necessity is demonstrated by the following IMPAIRMENTS/PROBLEM LIST:   1)Increase in pain level limiting function   2)Decreased range of motion limiting function   3)Decreased strength limiting function   4)Impaired posture   5)Lack of HEP    GOALS: Short Term Goals: 3 weeks  1.Report decreased neck pain  <   / =  4  /10  to increase tolerance for completion of ADLs  2. Increase L shoulder AROM to 120 active flexion to demonstrate improvements in AROM  3. Increased MMT  for middle trapezius to 4+/5 bilaterally to increase tolerance for ADL and work activities.  4. Pt to report a decreased incidence of neck pain with driving to demonstrate improved ADL tolerance  5. Pt to tolerate HEP to improve ROM and independence with ADL's    Long Term Goals: 6 weeks  1. Report decreased neck pain  <   / =  3  /10  to increase tolerance for completion of ADLs  2. Increase L shoulder abduction PROM to 120 to demonstrate increases in range of motion  3 .Increased MMT  for  Left shoulder abduction to 4/5  to increase tolerance for ADL and work activities.  4. Pt will report at CJ level (20-40% impaired) on FOTO score for neck pain disability to demonstrate decrease in disability and improvement in neck pain.  5. Pt to be Independent with HEP to improve ROM and independence with ADL's      Plan     Pt will be treated by physical therapy 1-3 times a week for 6 weeks for Pt Education, HEP, therapeutic exercises, neuromuscular re-education, joint mobilizations, modalities prn to achieve established goals. Indio may at times be seen by a PTA as part of the Rehab Team.     Cont PT for  6  weeks.     Alla Beck, ANASTASIIA  11/8/2017    I certify the need for these services furnished under this plan of treatment and while under my care.    ______________________________ Physician/Referring Practitioner  Date of Signature

## 2017-11-08 NOTE — PLAN OF CARE
"Physical Therapy Evaluation    Name: Indio Ryder Jr.  Clinic Number: [id number]    Diagnosis:   Encounter Diagnoses   Name Primary?    Neck pain Yes    Acute pain of left shoulder      Physician: Cj Hammer MD  Treatment Orders: PT Eval and Treat    Past Medical History:   Diagnosis Date    Actinomyces infection 1/17/2017    Right foot    Diabetic ketoacidosis without coma associated with type 2 diabetes mellitus 5/30/2017    Diabetic ulcer of right foot associated with type 2 diabetes mellitus 6/3/2015    Essential hypertension 6/6/2013    Group B streptococcal infection 1/13/2017    Mixed hyperlipidemia 8/12/2014    Shoulder impingement 8/12/2014    Subacute osteomyelitis of right foot 1/12/2017    Streptococcus agalactiae, Actinomyces odontolyticus    Traumatic amputation of fifth toe of right foot 7/2/2015    Type 2 diabetes mellitus with diabetic neuropathy, with long-term current use of insulin 5/3/2016    Ulcerative colitis, unspecified      Current Outpatient Prescriptions   Medication Sig    aspirin (ECOTRIN) 81 MG EC tablet Take 1 tablet (81 mg total) by mouth once daily. 11 am    atorvastatin (LIPITOR) 80 MG tablet 1 tab po q day    BD ULTRA-FINE SASCHA PEN NEEDLES 32 gauge x 5/32" Ndle Uses 1x daily, on daily insulin injections    blood sugar diagnostic Strp 1 strip by Misc.(Non-Drug; Combo Route) route once daily.    dulaglutide (TRULICITY) 0.75 mg/0.5 mL PnIj Inject 0.5 mLs (0.75 mg total) into the skin every 7 days.    insulin glargine (LANTUS SOLOSTAR) 100 unit/mL (3 mL) InPn pen Inject 26 Units into the skin every evening. 11 PM    lancets (ACCU-CHEK SOFTCLIX LANCETS) Misc 1 Device by Misc.(Non-Drug; Combo Route) route once daily.    lisinopril 10 MG tablet Take 1 tablet (10 mg total) by mouth once daily.    ONETOUCH DELICA LANCETS 33 gauge Misc     ONETOUCH ULTRA2 kit     tizanidine (ZANAFLEX) 4 MG tablet Take 1 tablet (4 mg total) by mouth 2 (two) times daily as " needed (muscle aches).     No current facility-administered medications for this visit.      Review of patient's allergies indicates:  No Known Allergies  Precautions: none    Evaluation Date: 11/8/2017  Visit # authorized: 1/1  Authorization period: -12/31/2017  Plan of Care Expiration: 12/8/2017    Subjective     Onset/ZANDER: gradual    Primary concern/ Chief complaints:    Indio is a 47 y.o. male with PMH of shoulder pain, peripheral neuropathy, and DM that presents to [facility] secondary to left sided neck and shoulder pain.  Injury/surgery occurred on 1 month ago.  Reports that no particular event or injury caused his pain to start, and that it has been progressively getting worse.    X-ray was taken and revealed:     Cervical x-ray: Reversal of the normal cervical lordosis. Cervical spondylosis.    Left shoulder x-ray: No evidence for acute fracture, bone destruction, or dislocation.    Previous treatment included exercise, heat, and muscle relaxers, pain medication with little help. Pt reports that lifting the left arm increases left arm and neck pain and reports neck and arm pain at worst is a 10 on the VAS. Reports that his neck and arm pain is about even. Prolonged positioning causes more pain than singular movements. Pt uses exercise and heat to control left arm pain symptoms. Pt has a decrease ability to perform ADLs such as lifting, reaching. Pt works as a  at [employer] and job related duties include walking, standing, carrying light objects. Pt reports constant numbness and tingling in both hands and up the left arm. Pt has also tried some stretching exercises from [place] and reports no significant change. No cultural, environmental, or spiritual barriers identified to treatment or learning.    Pain Scale: Indio rates pain on a scale of 0-10 to be 10 at worst; 5 currently; 3 at best .    Patient Goals: Pt would like to decrease pain and increase function so he can  return to pain-free completion of all normal daily activities.      Objective     Observation:     Posture: rounded shoulder posture, mild    Cervical Range of Motion:    Degrees Pain   Flexion 50 Stretching in paraspinals     Extension 45 No pain     Right Rotation 55 No pain     Left Rotation 55 No pain     Right Side Bending 40 No pain   Left Side Bending 40  No pain      Shoulder Active Range of Motion:   Shoulder Left Right   Flexion 110 WNL   Abduction 30 WNL   ER 50% WNL   IR WNL WNL     Shoulder Passive Range of Motion:   Shoulder Left Right   Flexion 130 WNL   Abduction 90 WNL   ER 50% WNL   IR WNL WNL     *able to flex left shoulder to 120 in supine  *able to abduct left shoulder to 90 in supine    Strength:  Cervical MMT   Flexion 4   Extension 4   Right Side Bend 4   Left Side Bend 4     Upper Extremity Strength  (R) UE  (L) UE    Shoulder flexion: 4+/5 Shoulder flexion: 3/5   Shoulder Abduction: 4+/5 Shoulder abduction: 3/5   Shoulder ER 4+/5 Shoulder ER 3+/5   Shoulder IR 4+/5 Shoulder IR 4+/5   Elbow flexion: 4+/5 Elbow flexion: 4+/5   Elbow extension: 4+/5 Elbow extension: 4+/5   Wrist flexion: 5/5 Wrist flexion: 5/5   Wrist extension: 5/5 Wrist extension: 5/5    5/5 : 5/5   Lower Trap 5/5 Lower Trap 3/5   Middle Trap 5/5 Middle Trap 4-/5   Rhomboids 4+/5 Rhomboids 4-/5     Joint Mobility: WNL    Palpation: ttp to L acromion      Sensation: WNL    Flexibility: decreased into L shoulder flexion, abduction, external rotation    G-code Reporting:  Category: mobility (Mobility, Self-Care, etc. )  Tool: FOTO neck  Score: 51% impairment  Goal:   ( 20%-40% impairment)  Current:  CK%-40% impairment)      PT Evaluation Completed? Yes  Discussed Plan of Care with patient: Yes    TREATMENT:  Indio received therapeutic exercises to develop strength and endurance, flexibility for 15 minutes including: isometric external rotation, flexion table slides, abduction table slides. (HEP code =  [id number])     Instructed pt. Regarding: Proper technique with all exercises. Pt demo good understanding of the education provided. Indio demonstrated good return demonstration of activities.      Assessment     This is a 47 y.o. male referred to outpatient physical therapy and presents with a medical diagnosis of neck pain and demonstrates limitations as described in the problem list. Pt will benefit from physical therapy services in order to maximize pain free functional independence. The following goals were discussed with the patient and patient is in agreement with them as to be addressed in the treatment plan. Pt was given a HEP consisting of  isometric external rotation, flexion table slides, abduction table slides. (HEP code = [id number]). Pt verbally understood the instructions as they were given and demonstrated proper form and technique during therapy. Pt was advised to perform these exercises free of pain, and to stop performing them if pain occurs.     Pt presents with decreased range of motion into shoulder flexion, shoulder abduction, and left shoulder external rotation, and would benefit from stretching of shoulder girdle musculature in order to decrease muscle guarding and pain as well as to allow for increases of range of motion into above directions. To decrease stresses on the cervical spine with completion of ADLs, pt would benefit from strengthening of periscapular musculature and stretching of pectorals as tolerated for postural re-education. Pt would benefit from postural reeducation and periscapular strengthening to both decrease stresses on rotator cuff musculature and to decrease pain in the cervical spine.    Patient History Examination Clinical Presentation Clinical Decision Making   Comorbidities:  arthritis    Personal Factors:  none       Activity and Participation Restriction:  Mobility  General tasks and demands    Body Systems:  musculoskeletal    Body Regions:  Upper  extremity  Neck   Stable and uncomplicated     Low            Medical necessity is demonstrated by the following IMPAIRMENTS/PROBLEM LIST:   1)Increase in pain level limiting function   2)Decreased range of motion limiting function   3)Decreased strength limiting function   4)Impaired posture   5)Lack of HEP    GOALS: Short Term Goals: 3 weeks  1.Report decreased neck pain  <   / =  4  /10  to increase tolerance for completion of ADLs  2. Increase L shoulder AROM to 120 active flexion to demonstrate improvements in AROM  3. Increased MMT  for middle trapezius to 4+/5 bilaterally to increase tolerance for ADL and work activities.  4. Pt to report a decreased incidence of neck pain with driving to demonstrate improved ADL tolerance  5. Pt to tolerate HEP to improve ROM and independence with ADL's    Long Term Goals: 6 weeks  1. Report decreased neck pain  <   / =  3  /10  to increase tolerance for completion of ADLs  2. Increase L shoulder abduction PROM to 120 to demonstrate increases in range of motion  3 .Increased MMT  for  Left shoulder abduction to 4/5  to increase tolerance for ADL and work activities.  4. Pt will report at CJ level (20-40% impaired) on FOTO score for neck pain disability to demonstrate decrease in disability and improvement in neck pain.  5. Pt to be Independent with HEP to improve ROM and independence with ADL's      Plan     Pt will be treated by physical therapy 1-3 times a week for 6 weeks for Pt Education, HEP, therapeutic exercises, neuromuscular re-education, joint mobilizations, modalities prn to achieve established goals. Indio may at times be seen by a PTA as part of the Rehab Team.     Cont PT for  6  weeks.     Alla Beck, ANASTASIIA  11/8/2017    I certify the need for these services furnished under this plan of treatment and while under my care.    ______________________________ Physician/Referring Practitioner  Date of Signature

## 2018-05-09 DIAGNOSIS — E11.9 TYPE 2 DIABETES MELLITUS WITHOUT COMPLICATION: ICD-10-CM

## 2018-05-17 NOTE — PROGRESS NOTES
Hi, please call him and let him know that he is due for followup with Dr. Lorena Thakur MD his pcp for his diabetes.  Thank you, Cj Hammer

## 2019-04-23 NOTE — PLAN OF CARE
Problem: Patient Care Overview  Goal: Plan of Care Review  Outcome: Ongoing (interventions implemented as appropriate)  Pt stable during shift with no acute events. No ectopic events via tele. Pt denies any pain. RLE elevated,swelling noted. IV antibiotics continued per order. Safety maintained.Will cont to monitor pt       Improved

## 2019-07-12 ENCOUNTER — HOSPITAL ENCOUNTER (INPATIENT)
Facility: HOSPITAL | Age: 49
LOS: 21 days | Discharge: HOME HEALTH | DRG: 853 | End: 2019-08-02
Attending: EMERGENCY MEDICINE | Admitting: EMERGENCY MEDICINE
Payer: MEDICAID

## 2019-07-12 ENCOUNTER — ANESTHESIA EVENT (OUTPATIENT)
Dept: SURGERY | Facility: HOSPITAL | Age: 49
DRG: 853 | End: 2019-07-12
Payer: MEDICAID

## 2019-07-12 DIAGNOSIS — L08.9 LEFT FOOT INFECTION: ICD-10-CM

## 2019-07-12 DIAGNOSIS — Z79.4 TYPE 2 DIABETES MELLITUS WITH KETOACIDOSIS WITHOUT COMA, WITH LONG-TERM CURRENT USE OF INSULIN: ICD-10-CM

## 2019-07-12 DIAGNOSIS — Z79.4 TYPE 2 DIABETES MELLITUS WITH HYPERGLYCEMIA, WITH LONG-TERM CURRENT USE OF INSULIN: ICD-10-CM

## 2019-07-12 DIAGNOSIS — R26.89 IMPAIRED GAIT AND MOBILITY: ICD-10-CM

## 2019-07-12 DIAGNOSIS — M79.609 LIMB PAIN: ICD-10-CM

## 2019-07-12 DIAGNOSIS — B95.1 BACTEREMIA DUE TO GROUP B STREPTOCOCCUS: ICD-10-CM

## 2019-07-12 DIAGNOSIS — A41.9 SEPSIS, DUE TO UNSPECIFIED ORGANISM: Primary | ICD-10-CM

## 2019-07-12 DIAGNOSIS — M86.10 ACUTE OSTEOMYELITIS: ICD-10-CM

## 2019-07-12 DIAGNOSIS — E11.10 TYPE 2 DIABETES MELLITUS WITH KETOACIDOSIS WITHOUT COMA, WITH LONG-TERM CURRENT USE OF INSULIN: ICD-10-CM

## 2019-07-12 DIAGNOSIS — I10 ESSENTIAL HYPERTENSION: Chronic | ICD-10-CM

## 2019-07-12 DIAGNOSIS — L97.512 SKIN ULCER OF RIGHT FOOT WITH FAT LAYER EXPOSED: ICD-10-CM

## 2019-07-12 DIAGNOSIS — R78.81 BACTEREMIA DUE TO GROUP B STREPTOCOCCUS: ICD-10-CM

## 2019-07-12 DIAGNOSIS — R73.9 HYPERGLYCEMIA: ICD-10-CM

## 2019-07-12 DIAGNOSIS — E11.65 TYPE 2 DIABETES MELLITUS WITH HYPERGLYCEMIA, WITH LONG-TERM CURRENT USE OF INSULIN: ICD-10-CM

## 2019-07-12 DIAGNOSIS — E11.10 DIABETIC KETOACIDOSIS WITHOUT COMA ASSOCIATED WITH TYPE 2 DIABETES MELLITUS: ICD-10-CM

## 2019-07-12 PROBLEM — D64.9 ANEMIA: Status: ACTIVE | Noted: 2019-07-12

## 2019-07-12 PROBLEM — L97.509 FOOT ULCER: Status: ACTIVE | Noted: 2019-07-12

## 2019-07-12 PROBLEM — D72.829 LEUKOCYTOSIS: Status: ACTIVE | Noted: 2019-07-12

## 2019-07-12 LAB
ALBUMIN SERPL BCP-MCNC: 2.1 G/DL (ref 3.5–5.2)
ALBUMIN SERPL BCP-MCNC: 2.1 G/DL (ref 3.5–5.2)
ALBUMIN SERPL BCP-MCNC: 2.5 G/DL (ref 3.5–5.2)
ALLENS TEST: ABNORMAL
ALP SERPL-CCNC: 113 U/L (ref 55–135)
ALP SERPL-CCNC: 148 U/L (ref 55–135)
ALT SERPL W/O P-5'-P-CCNC: 5 U/L (ref 10–44)
ALT SERPL W/O P-5'-P-CCNC: <5 U/L (ref 10–44)
ANION GAP SERPL CALC-SCNC: 14 MMOL/L (ref 8–16)
ANION GAP SERPL CALC-SCNC: 16 MMOL/L (ref 8–16)
ANION GAP SERPL CALC-SCNC: 21 MMOL/L (ref 8–16)
AST SERPL-CCNC: 6 U/L (ref 10–40)
AST SERPL-CCNC: 7 U/L (ref 10–40)
B-OH-BUTYR BLD STRIP-SCNC: 4.1 MMOL/L (ref 0–0.5)
BACTERIA #/AREA URNS AUTO: NORMAL /HPF
BASOPHILS # BLD AUTO: 0.03 K/UL (ref 0–0.2)
BASOPHILS NFR BLD: 0.2 % (ref 0–1.9)
BILIRUB SERPL-MCNC: 0.3 MG/DL (ref 0.1–1)
BILIRUB SERPL-MCNC: 0.4 MG/DL (ref 0.1–1)
BILIRUB UR QL STRIP: NEGATIVE
BNP SERPL-MCNC: 41 PG/ML (ref 0–99)
BUN SERPL-MCNC: 11 MG/DL (ref 6–20)
BUN SERPL-MCNC: 14 MG/DL (ref 6–20)
BUN SERPL-MCNC: 9 MG/DL (ref 6–20)
CALCIUM SERPL-MCNC: 9 MG/DL (ref 8.7–10.5)
CALCIUM SERPL-MCNC: 9.4 MG/DL (ref 8.7–10.5)
CALCIUM SERPL-MCNC: 9.9 MG/DL (ref 8.7–10.5)
CHLORIDE SERPL-SCNC: 103 MMOL/L (ref 95–110)
CHLORIDE SERPL-SCNC: 105 MMOL/L (ref 95–110)
CHLORIDE SERPL-SCNC: 97 MMOL/L (ref 95–110)
CLARITY UR REFRACT.AUTO: CLEAR
CO2 SERPL-SCNC: 23 MMOL/L (ref 23–29)
CO2 SERPL-SCNC: 23 MMOL/L (ref 23–29)
CO2 SERPL-SCNC: 25 MMOL/L (ref 23–29)
COLOR UR AUTO: YELLOW
CREAT SERPL-MCNC: 1 MG/DL (ref 0.5–1.4)
CREAT SERPL-MCNC: 1 MG/DL (ref 0.5–1.4)
CREAT SERPL-MCNC: 1.3 MG/DL (ref 0.5–1.4)
CRP SERPL-MCNC: 95.8 MG/L (ref 0–8.2)
DIFFERENTIAL METHOD: ABNORMAL
EOSINOPHIL # BLD AUTO: 0 K/UL (ref 0–0.5)
EOSINOPHIL NFR BLD: 0.1 % (ref 0–8)
ERYTHROCYTE [DISTWIDTH] IN BLOOD BY AUTOMATED COUNT: 12.9 % (ref 11.5–14.5)
ERYTHROCYTE [SEDIMENTATION RATE] IN BLOOD BY WESTERGREN METHOD: >120 MM/HR (ref 0–23)
EST. GFR  (AFRICAN AMERICAN): >60 ML/MIN/1.73 M^2
EST. GFR  (NON AFRICAN AMERICAN): >60 ML/MIN/1.73 M^2
ESTIMATED AVG GLUCOSE: ABNORMAL MG/DL (ref 68–131)
GLUCOSE SERPL-MCNC: 222 MG/DL (ref 70–110)
GLUCOSE SERPL-MCNC: 229 MG/DL (ref 70–110)
GLUCOSE SERPL-MCNC: 230 MG/DL (ref 70–110)
GLUCOSE SERPL-MCNC: 232 MG/DL (ref 70–110)
GLUCOSE SERPL-MCNC: 349 MG/DL (ref 70–110)
GLUCOSE UR QL STRIP: ABNORMAL
HBA1C MFR BLD HPLC: >14 % (ref 4–5.6)
HCO3 UR-SCNC: 30.6 MMOL/L (ref 24–28)
HCT VFR BLD AUTO: 37.7 % (ref 40–54)
HGB BLD-MCNC: 12 G/DL (ref 14–18)
HGB UR QL STRIP: NEGATIVE
HYALINE CASTS UR QL AUTO: 1 /LPF
IMM GRANULOCYTES # BLD AUTO: 0.06 K/UL (ref 0–0.04)
IMM GRANULOCYTES NFR BLD AUTO: 0.4 % (ref 0–0.5)
KETONES UR QL STRIP: ABNORMAL
LACTATE SERPL-SCNC: 1.4 MMOL/L (ref 0.5–2.2)
LACTATE SERPL-SCNC: 1.5 MMOL/L (ref 0.5–2.2)
LEUKOCYTE ESTERASE UR QL STRIP: NEGATIVE
LIPASE SERPL-CCNC: <3 U/L (ref 4–60)
LYMPHOCYTES # BLD AUTO: 1.5 K/UL (ref 1–4.8)
LYMPHOCYTES NFR BLD: 10.2 % (ref 18–48)
MAGNESIUM SERPL-MCNC: 1.7 MG/DL (ref 1.6–2.6)
MCH RBC QN AUTO: 28.5 PG (ref 27–31)
MCHC RBC AUTO-ENTMCNC: 31.8 G/DL (ref 32–36)
MCV RBC AUTO: 90 FL (ref 82–98)
MICROSCOPIC COMMENT: NORMAL
MONOCYTES # BLD AUTO: 1 K/UL (ref 0.3–1)
MONOCYTES NFR BLD: 7.3 % (ref 4–15)
NEUTROPHILS # BLD AUTO: 11.6 K/UL (ref 1.8–7.7)
NEUTROPHILS NFR BLD: 81.8 % (ref 38–73)
NITRITE UR QL STRIP: NEGATIVE
NRBC BLD-RTO: 0 /100 WBC
PCO2 BLDA: 60.7 MMHG (ref 35–45)
PH SMN: 7.31 [PH] (ref 7.35–7.45)
PH UR STRIP: 5 [PH] (ref 5–8)
PHOSPHATE SERPL-MCNC: 1.5 MG/DL (ref 2.7–4.5)
PLATELET # BLD AUTO: 380 K/UL (ref 150–350)
PMV BLD AUTO: 9.8 FL (ref 9.2–12.9)
PO2 BLDA: 57 MMHG (ref 40–60)
POC BE: 4 MMOL/L
POC SATURATED O2: 85 % (ref 95–100)
POC TCO2: 32 MMOL/L (ref 24–29)
POCT GLUCOSE: 216 MG/DL (ref 70–110)
POCT GLUCOSE: 221 MG/DL (ref 70–110)
POCT GLUCOSE: 222 MG/DL (ref 70–110)
POCT GLUCOSE: 229 MG/DL (ref 70–110)
POCT GLUCOSE: 239 MG/DL (ref 70–110)
POCT GLUCOSE: 246 MG/DL (ref 70–110)
POCT GLUCOSE: 248 MG/DL (ref 70–110)
POCT GLUCOSE: 263 MG/DL (ref 70–110)
POCT GLUCOSE: 267 MG/DL (ref 70–110)
POCT GLUCOSE: 346 MG/DL (ref 70–110)
POCT GLUCOSE: 378 MG/DL (ref 70–110)
POTASSIUM SERPL-SCNC: 3.5 MMOL/L (ref 3.5–5.1)
POTASSIUM SERPL-SCNC: 3.7 MMOL/L (ref 3.5–5.1)
POTASSIUM SERPL-SCNC: 3.8 MMOL/L (ref 3.5–5.1)
PROT SERPL-MCNC: 7.5 G/DL (ref 6–8.4)
PROT SERPL-MCNC: 8.5 G/DL (ref 6–8.4)
PROT UR QL STRIP: ABNORMAL
RBC # BLD AUTO: 4.21 M/UL (ref 4.6–6.2)
RBC #/AREA URNS AUTO: 0 /HPF (ref 0–4)
SAMPLE: ABNORMAL
SITE: ABNORMAL
SODIUM SERPL-SCNC: 141 MMOL/L (ref 136–145)
SODIUM SERPL-SCNC: 142 MMOL/L (ref 136–145)
SODIUM SERPL-SCNC: 144 MMOL/L (ref 136–145)
SP GR UR STRIP: 1.03 (ref 1–1.03)
SQUAMOUS #/AREA URNS AUTO: 0 /HPF
TROPONIN I SERPL DL<=0.01 NG/ML-MCNC: 0.07 NG/ML (ref 0–0.03)
TROPONIN I SERPL DL<=0.01 NG/ML-MCNC: <0.006 NG/ML (ref 0–0.03)
URN SPEC COLLECT METH UR: ABNORMAL
WBC # BLD AUTO: 14.21 K/UL (ref 3.9–12.7)
WBC #/AREA URNS AUTO: 0 /HPF (ref 0–5)
YEAST UR QL AUTO: NORMAL

## 2019-07-12 PROCEDURE — 83605 ASSAY OF LACTIC ACID: CPT

## 2019-07-12 PROCEDURE — 96361 HYDRATE IV INFUSION ADD-ON: CPT

## 2019-07-12 PROCEDURE — 83690 ASSAY OF LIPASE: CPT

## 2019-07-12 PROCEDURE — 36415 COLL VENOUS BLD VENIPUNCTURE: CPT

## 2019-07-12 PROCEDURE — 99232 SBSQ HOSP IP/OBS MODERATE 35: CPT | Mod: ,,, | Performed by: INTERNAL MEDICINE

## 2019-07-12 PROCEDURE — 85025 COMPLETE CBC W/AUTO DIFF WBC: CPT

## 2019-07-12 PROCEDURE — 63600175 PHARM REV CODE 636 W HCPCS: Performed by: PHYSICIAN ASSISTANT

## 2019-07-12 PROCEDURE — 99291 CRITICAL CARE FIRST HOUR: CPT | Mod: 25

## 2019-07-12 PROCEDURE — 80069 RENAL FUNCTION PANEL: CPT

## 2019-07-12 PROCEDURE — 99291 CRITICAL CARE FIRST HOUR: CPT | Mod: ,,, | Performed by: EMERGENCY MEDICINE

## 2019-07-12 PROCEDURE — 63600175 PHARM REV CODE 636 W HCPCS: Performed by: EMERGENCY MEDICINE

## 2019-07-12 PROCEDURE — 87147 CULTURE TYPE IMMUNOLOGIC: CPT | Mod: 59

## 2019-07-12 PROCEDURE — 99223 PR INITIAL HOSPITAL CARE,LEVL III: ICD-10-PCS | Mod: ,,, | Performed by: HOSPITALIST

## 2019-07-12 PROCEDURE — 25000003 PHARM REV CODE 250: Performed by: HOSPITALIST

## 2019-07-12 PROCEDURE — 85652 RBC SED RATE AUTOMATED: CPT

## 2019-07-12 PROCEDURE — 93010 EKG 12-LEAD: ICD-10-PCS | Mod: ,,, | Performed by: INTERNAL MEDICINE

## 2019-07-12 PROCEDURE — 87076 CULTURE ANAEROBE IDENT EACH: CPT

## 2019-07-12 PROCEDURE — 87040 BLOOD CULTURE FOR BACTERIA: CPT | Mod: 59

## 2019-07-12 PROCEDURE — 99232 PR SUBSEQUENT HOSPITAL CARE,LEVL II: ICD-10-PCS | Mod: ,,, | Performed by: INTERNAL MEDICINE

## 2019-07-12 PROCEDURE — 99223 1ST HOSP IP/OBS HIGH 75: CPT | Mod: ,,, | Performed by: HOSPITALIST

## 2019-07-12 PROCEDURE — 93005 ELECTROCARDIOGRAM TRACING: CPT

## 2019-07-12 PROCEDURE — 87077 CULTURE AEROBIC IDENTIFY: CPT

## 2019-07-12 PROCEDURE — 63600175 PHARM REV CODE 636 W HCPCS: Performed by: HOSPITALIST

## 2019-07-12 PROCEDURE — 86140 C-REACTIVE PROTEIN: CPT

## 2019-07-12 PROCEDURE — 87186 SC STD MICRODIL/AGAR DIL: CPT | Mod: 59

## 2019-07-12 PROCEDURE — 87075 CULTR BACTERIA EXCEPT BLOOD: CPT

## 2019-07-12 PROCEDURE — 83036 HEMOGLOBIN GLYCOSYLATED A1C: CPT

## 2019-07-12 PROCEDURE — 82010 KETONE BODYS QUAN: CPT

## 2019-07-12 PROCEDURE — 25000003 PHARM REV CODE 250: Performed by: PHYSICIAN ASSISTANT

## 2019-07-12 PROCEDURE — 84484 ASSAY OF TROPONIN QUANT: CPT

## 2019-07-12 PROCEDURE — 87070 CULTURE OTHR SPECIMN AEROBIC: CPT

## 2019-07-12 PROCEDURE — 83735 ASSAY OF MAGNESIUM: CPT

## 2019-07-12 PROCEDURE — 82962 GLUCOSE BLOOD TEST: CPT

## 2019-07-12 PROCEDURE — 83880 ASSAY OF NATRIURETIC PEPTIDE: CPT

## 2019-07-12 PROCEDURE — 99291 PR CRITICAL CARE, E/M 30-74 MINUTES: ICD-10-PCS | Mod: ,,, | Performed by: EMERGENCY MEDICINE

## 2019-07-12 PROCEDURE — 81001 URINALYSIS AUTO W/SCOPE: CPT

## 2019-07-12 PROCEDURE — 96365 THER/PROPH/DIAG IV INF INIT: CPT

## 2019-07-12 PROCEDURE — 80053 COMPREHEN METABOLIC PANEL: CPT | Mod: 91

## 2019-07-12 PROCEDURE — 93010 ELECTROCARDIOGRAM REPORT: CPT | Mod: ,,, | Performed by: INTERNAL MEDICINE

## 2019-07-12 PROCEDURE — 80053 COMPREHEN METABOLIC PANEL: CPT

## 2019-07-12 PROCEDURE — 20600001 HC STEP DOWN PRIVATE ROOM

## 2019-07-12 PROCEDURE — 82803 BLOOD GASES ANY COMBINATION: CPT

## 2019-07-12 PROCEDURE — 96375 TX/PRO/DX INJ NEW DRUG ADDON: CPT

## 2019-07-12 PROCEDURE — 25000003 PHARM REV CODE 250: Performed by: EMERGENCY MEDICINE

## 2019-07-12 RX ORDER — IBUPROFEN 200 MG
24 TABLET ORAL
Status: DISCONTINUED | OUTPATIENT
Start: 2019-07-12 | End: 2019-07-15

## 2019-07-12 RX ORDER — METFORMIN HYDROCHLORIDE 500 MG/1
500 TABLET ORAL 2 TIMES DAILY WITH MEALS
COMMUNITY
End: 2019-08-26 | Stop reason: SDUPTHER

## 2019-07-12 RX ORDER — SODIUM CHLORIDE 9 MG/ML
INJECTION, SOLUTION INTRAVENOUS CONTINUOUS
Status: ACTIVE | OUTPATIENT
Start: 2019-07-12 | End: 2019-07-13

## 2019-07-12 RX ORDER — IBUPROFEN 200 MG
16 TABLET ORAL
Status: DISCONTINUED | OUTPATIENT
Start: 2019-07-12 | End: 2019-07-15

## 2019-07-12 RX ORDER — VANCOMYCIN 2 GRAM/500 ML IN 0.9 % SODIUM CHLORIDE INTRAVENOUS
2000
Status: COMPLETED | OUTPATIENT
Start: 2019-07-12 | End: 2019-07-12

## 2019-07-12 RX ORDER — GLUCAGON 1 MG
1 KIT INJECTION
Status: DISCONTINUED | OUTPATIENT
Start: 2019-07-12 | End: 2019-07-13

## 2019-07-12 RX ORDER — ONDANSETRON 2 MG/ML
4 INJECTION INTRAMUSCULAR; INTRAVENOUS
Status: COMPLETED | OUTPATIENT
Start: 2019-07-12 | End: 2019-07-12

## 2019-07-12 RX ORDER — POTASSIUM CHLORIDE 750 MG/1
30 CAPSULE, EXTENDED RELEASE ORAL ONCE
Status: COMPLETED | OUTPATIENT
Start: 2019-07-12 | End: 2019-07-12

## 2019-07-12 RX ORDER — ACETAMINOPHEN 325 MG/1
650 TABLET ORAL EVERY 6 HOURS PRN
Status: DISCONTINUED | OUTPATIENT
Start: 2019-07-12 | End: 2019-08-02 | Stop reason: HOSPADM

## 2019-07-12 RX ORDER — MAGNESIUM SULFATE HEPTAHYDRATE 40 MG/ML
2 INJECTION, SOLUTION INTRAVENOUS ONCE
Status: COMPLETED | OUTPATIENT
Start: 2019-07-12 | End: 2019-07-12

## 2019-07-12 RX ORDER — ATORVASTATIN CALCIUM 20 MG/1
80 TABLET, FILM COATED ORAL DAILY
Status: DISCONTINUED | OUTPATIENT
Start: 2019-07-12 | End: 2019-08-02 | Stop reason: HOSPADM

## 2019-07-12 RX ORDER — ONDANSETRON 2 MG/ML
4 INJECTION INTRAMUSCULAR; INTRAVENOUS EVERY 8 HOURS PRN
Status: DISCONTINUED | OUTPATIENT
Start: 2019-07-12 | End: 2019-07-13

## 2019-07-12 RX ORDER — SODIUM CHLORIDE 0.9 % (FLUSH) 0.9 %
10 SYRINGE (ML) INJECTION
Status: DISCONTINUED | OUTPATIENT
Start: 2019-07-12 | End: 2019-08-02 | Stop reason: HOSPADM

## 2019-07-12 RX ADMIN — SODIUM CHLORIDE: 0.9 INJECTION, SOLUTION INTRAVENOUS at 03:07

## 2019-07-12 RX ADMIN — VANCOMYCIN HYDROCHLORIDE 2000 MG: 100 INJECTION, POWDER, LYOPHILIZED, FOR SOLUTION INTRAVENOUS at 12:07

## 2019-07-12 RX ADMIN — SODIUM CHLORIDE 2790 ML: 0.9 INJECTION, SOLUTION INTRAVENOUS at 10:07

## 2019-07-12 RX ADMIN — PROMETHAZINE HYDROCHLORIDE 25 MG: 25 INJECTION INTRAMUSCULAR; INTRAVENOUS at 11:07

## 2019-07-12 RX ADMIN — ACETAMINOPHEN 650 MG: 325 TABLET ORAL at 07:07

## 2019-07-12 RX ADMIN — PROMETHAZINE HYDROCHLORIDE 12.5 MG: 25 INJECTION INTRAMUSCULAR; INTRAVENOUS at 08:07

## 2019-07-12 RX ADMIN — SODIUM PHOSPHATE, MONOBASIC, MONOHYDRATE 20.01 MMOL: 276; 142 INJECTION, SOLUTION INTRAVENOUS at 09:07

## 2019-07-12 RX ADMIN — MAGNESIUM SULFATE IN WATER 2 G: 40 INJECTION, SOLUTION INTRAVENOUS at 05:07

## 2019-07-12 RX ADMIN — SODIUM CHLORIDE: 0.9 INJECTION, SOLUTION INTRAVENOUS at 11:07

## 2019-07-12 RX ADMIN — ONDANSETRON 4 MG: 2 INJECTION INTRAMUSCULAR; INTRAVENOUS at 03:07

## 2019-07-12 RX ADMIN — PIPERACILLIN AND TAZOBACTAM 4.5 G: 4; .5 INJECTION, POWDER, LYOPHILIZED, FOR SOLUTION INTRAVENOUS; PARENTERAL at 09:07

## 2019-07-12 RX ADMIN — PIPERACILLIN AND TAZOBACTAM 4.5 G: 4; .5 INJECTION, POWDER, LYOPHILIZED, FOR SOLUTION INTRAVENOUS; PARENTERAL at 11:07

## 2019-07-12 RX ADMIN — ONDANSETRON 4 MG: 2 INJECTION INTRAMUSCULAR; INTRAVENOUS at 10:07

## 2019-07-12 RX ADMIN — ATORVASTATIN CALCIUM 80 MG: 20 TABLET, FILM COATED ORAL at 02:07

## 2019-07-12 RX ADMIN — POTASSIUM CHLORIDE 30 MEQ: 750 CAPSULE, EXTENDED RELEASE ORAL at 05:07

## 2019-07-12 RX ADMIN — SODIUM CHLORIDE 4 UNITS/HR: 9 INJECTION, SOLUTION INTRAVENOUS at 12:07

## 2019-07-12 NOTE — CONSULTS
"[facility]  Endocrinology  Diabetes Consult Note    Consult Requested by: Trung Fall MD   Reason for admit: Acute osteomyelitis    HISTORY OF PRESENT ILLNESS:  Reason for Consult: Management of T2DM, Hyperglycemia, DKA    Surgical Procedure and Date: n/a    Diabetes diagnosis year: >20 years, ?    Home Diabetes Medications:  Metformin 500mg BID, Lantus 26U QHS    How often checking glucose at home? None  BG readings on regimen: n/a  Hypoglycemia on the regimen?  No  Missed doses on regimen?  No    Diabetes Complications include:     Hyperglycemia and Foot ulcer      Complicating diabetes co morbidities:  HTN      HPI:   Patient is a 49 y.o. male with a diagnosis of Type 2 DM (noncomplaince, skip Levemir 1-2x a week), HTN, PVD, hx of right foot osteomyelitis who was admitted to hospital medicine for DKA and L foot ulcer. He report  4-5 days of nausea and vomiting. Vomiting is non-bloody. He has not been able to keep anything down. Pt hasn't been administering his insulin during this time due to the fact that he has not been able to keep anything down. He was found to have a large open wound on L foot, pt recently lost his insurance and has been unable to follow up with wound care. Xray showed new Osteomyelitis of L foot.  Pt report skipping his Lantus at home 1-2x a week, not on insulin with meal. Was on Trulicity but stopped due to lost of insurance. BHOB was 4.1, A1C >14, Anion gap of 21, glucose 349. Endocrine was consulted for "DKA", diabetes management.         Interval HPI:   Overnight events: admitted for DKA, found to have large L foot ulcer. Plan for OR per podiatry due to OM  Eatin%  Nausea: Yes  Hypoglycemia and intervention: No  Fever: No  TPN and/or TF: No  If yes, type of TF/TPN and rate: n/a    PMH, PSH, FH, SH updated and reviewed     ROS:    Review of Systems   Constitutional: Positive for fatigue. Negative for activity change and unexpected weight change. "   HENT: Negative for sore throat and voice change.    Eyes: Negative for visual disturbance.   Respiratory: Negative for shortness of breath.    Cardiovascular: Negative for chest pain.   Gastrointestinal: Positive for nausea and vomiting. Negative for abdominal distention, abdominal pain and constipation.   Genitourinary: Negative for urgency.   Musculoskeletal: Positive for arthralgias.   Skin: Positive for wound. Negative for rash.   Neurological: Positive for weakness. Negative for headaches.   Psychiatric/Behavioral: Negative for confusion and sleep disturbance.       Current Medications and/or Treatments Impacting Glycemic Control  Immunotherapy:    Immunosuppressants     None        Steroids:   Hormones (From admission, onward)    None        Pressors:    Autonomic Drugs (From admission, onward)    None        Hyperglycemia/Diabetes Medications:   Antihyperglycemics (From admission, onward)    Start     Stop Route Frequency Ordered    07/12/19 1245  insulin regular (Humulin R) 100 Units in sodium chloride 0.9% 100 mL infusion  (INSULIN INFUSIONS)     Question:  Insulin Rate Adjustment (DO NOT MODIFY ANSWER)  Answer:  \\[employer]\epic\Images\Pharmacy\InsulinInfusions\InsulinDKA [id number].pdf    -- IV Continuous 07/12/19 1132             PHYSICAL EXAMINATION:  Vitals:    07/12/19 1400   BP: (!) 168/92   Pulse: 108   Resp: 16   Temp:      Body mass index is 27.05 kg/m².    Physical Exam   Constitutional: He is oriented to person, place, and time. He appears well-developed and well-nourished. No distress.   Mild lethargic   Eyes: Conjunctivae are normal. No scleral icterus.   Neck: Normal range of motion. No tracheal deviation present. No thyromegaly present.   Cardiovascular: Normal rate and normal heart sounds.   Pulmonary/Chest: Effort normal and breath sounds normal.   Abdominal: Soft. There is no tenderness. No hernia.   Musculoskeletal: Normal range of motion. He exhibits deformity. He exhibits no edema or  tenderness.   L foot ulcer noted, now wrapped    Neurological: He is alert and oriented to person, place, and time.   Skin: Skin is warm. No erythema.   Psychiatric: He has a normal mood and affect. His behavior is normal.   Nursing note and vitals reviewed.        Labs Reviewed and Include   Recent Labs   Lab 07/12/19  1023   *   CALCIUM 9.9   ALBUMIN 2.5*   PROT 8.5*      K 3.8   CO2 23   CL 97   BUN 14   CREATININE 1.3   ALKPHOS 148*   ALT 5*   AST 7*   BILITOT 0.4     Lab Results   Component Value Date    WBC 14.21 (H) 07/12/2019    HGB 12.0 (L) 07/12/2019    HCT 37.7 (L) 07/12/2019    MCV 90 07/12/2019     (H) 07/12/2019     No results for input(s): TSH, FREET4 in the last 168 hours.  Lab Results   Component Value Date    HGBA1C >14.0 (H) 07/12/2019       Nutritional status:   Body mass index is 27.05 kg/m².  Lab Results   Component Value Date    ALBUMIN 2.5 (L) 07/12/2019    ALBUMIN 3.4 (L) 10/11/2017    ALBUMIN 3.0 (L) 06/28/2017     No results found for: PREALBUMIN    Estimated Creatinine Clearance: 76 mL/min (based on SCr of 1.3 mg/dL).    Accu-Checks  Recent Labs     07/12/19  0949 07/12/19  1234 07/12/19  1351 07/12/19  1458   POCTGLUCOSE 346* 378* 267* 221*        ASSESSMENT and PLAN    * Acute osteomyelitis  - New L foot OM  - management per primary and Podiatry  - On IV Abx      Diabetic ketoacidosis without coma associated with type 2 diabetes mellitus  - DKA on admission due to noncompliance of insulin use combine with Osteomyelitis of foot  - BHOB was 4.1, A1C >14, Anion gap of 21, glucose 349  - Discuss with Dr Wick, [facility] medicine to manage DKA, he voiced understanding, we recommend using DKA pathway      Recommend  - Consider using DKA pathway to help with orders  - Aggressive IVF, recommend checking renal function panel Q6 hr  - Once Bicarb >18, Anion gap is <10, Glucose <200 and Patient is able to tolerate oral food can convert to MDI at that time (we recommend  Levemir 23U QHS and Novolog 7U TIDWM at that time, low dose correction scale)  - Can consider D5 1/2 NS  Fluid to maintain glucose until those parameter are met  - Recommend aggresively correcting electrolytes (K, Ma, Phos) during this time  - start clear liquid sugar free diet for now            Type 2 diabetes mellitus with hyperglycemia, with long-term current use of insulin  - Uncontrolled type 2 diabetic, A1C >14, unfortunate case of lost of insurance leading to lost of follow up  - Admitted for DKA, see DKA section    Plan  - DKA management as above  - Will need to covert to Weight base MDI pending resolution of DKA, we recommend Levemir 23U QHS and Novolog 7U TIDWM at that time       Sepsis  - due to OM of L foot, management per primary  - will monitor glucose in stress state      Essential hypertension  - manage per primary          Plan discussed with patient, family, and RN at bedside.     Vaibhav Rodriguez MD  Endocrinology  [facility], Shari Miles MD,  have personally taken the history and examined the patient and agree with the resident's note as stated above.

## 2019-07-12 NOTE — ASSESSMENT & PLAN NOTE
Left foot ulcer probes to bone and tracks distally to 5th digit and medially across the plantar midfoot. Bedside debridement was performed in the ED today and upon debridement, liquefactive necrosis of skin, fat, subQ, fascia was noted to the level of bone.  Excised as much necrotic soft tissue as possible in the ED and extensively rrigated left foot ulcer with 1L of normal saline solution diluted in betadine. Packed the wound with betadine soaked idoform packing strips, gauze, kerlix, ACE. Nursing instructed to do dressing changes daily.   I&D washout of left foot with possible partial 5th ray amp is tentatively scheduled for [place] morning 7/14.  Pending MRI results for surgical planning and intervention  Pending ID consult  Wound cultures obtained and pending results.   Podiatry will continue to monitor closely over the weekend.

## 2019-07-12 NOTE — CONSULTS
[facility]  Podiatry  Consult Note    Patient Name: Indio Ryder Jr.  MRN: [id number]  Admission Date: 7/12/2019  Hospital Length of Stay: 0 days  Attending Physician: Trung Fall MD  Primary Care Provider: Lorena Thakur MD     Consults  Subjective:     History of Present Illness:  Indio Ryder Jr is a 49 y.o male with PMH of Type 2 DM (poorly controlled with long term use of insulin), HTN, diabetic neuropathy, right foot diabetic ulcers, HLD,  Right foot osteomyelitis  presents to [facility] ED 7/12/2019 with chief complaint of vomiting x 4 days.    Podiatry service was consulted for ulcer on the left foot. Patient states he noticed the ulcer 2 weeks ago. He believes his compressive stockings caused the ulcer. Patient states he has a history of right foot ulcers and has not seen a podiatrist or gone to wound care because he recently lost his medical insurance. Patient has history of right foot osteomyelitis and is s/p right partial 5th ray amp and right partial 1st ray amp. Reports treating ulcers on both feet at home with neosporin. Denies any trauma or pedal pain. Reports he has been experiencing nausea, vomitting, fever, and chills for the past 2 weeks and is unable to keep any solid food or liquids down.           Scheduled Meds:   atorvastatin  80 mg Oral Daily    magnesium sulfate IVPB  2 g Intravenous Once    piperacillin-tazobactam (ZOSYN) IVPB  4.5 g Intravenous Q8H    sodium phosphate IVPB  20.01 mmol Intravenous Once    [START ON 7/13/2019] vancomycin (VANCOCIN) IVPB  15 mg/kg Intravenous Q12H     Continuous Infusions:   sodium chloride 0.9% 150 mL/hr at 07/12/19 1540    insulin (HUMAN R) infusion (adults) 1 Units/hr (07/12/19 1502)     PRN Meds:acetaminophen, Dextrose 10% Bolus, Dextrose 10% Bolus, glucagon (human recombinant), glucose, glucose, ondansetron, sodium chloride 0.9%    Review of patient's allergies indicates:  No Known Allergies     Past Medical History:    Diagnosis Date    Actinomyces infection 1/17/2017    Right foot    Diabetic ketoacidosis without coma associated with type 2 diabetes mellitus 5/30/2017    Diabetic ulcer of right foot associated with type 2 diabetes mellitus 6/3/2015    Essential hypertension 6/6/2013    Group B streptococcal infection 1/13/2017    Mixed hyperlipidemia 8/12/2014    Shoulder impingement 8/12/2014    Subacute osteomyelitis of right foot 1/12/2017    Streptococcus agalactiae, Actinomyces odontolyticus    Traumatic amputation of fifth toe of right foot 7/2/2015    Type 2 diabetes mellitus with diabetic neuropathy, with long-term current use of insulin 5/3/2016    Ulcerative colitis, unspecified      Past Surgical History:   Procedure Laterality Date    AMPUTATION-TOE Right 6/5/2015    Performed by [facility] Manuel D[facility] at [facility] OR    AMPUTATION-TOE and Tendo achilles lengthening Right 1/19/2017    Performed by Ramirez Wilkes DPM at [facility] OR    INCISION AND DRAINAGE (I&D), FOOT Right 1/16/2017    Performed by Ramirez Wilkes [facility] at [facility] OR    RESECTION-METATARSAL HEAD Right 1/16/2017    Performed by [facility]kes [facility] at [facility] OR    TOE AMPUTATION Right 06/05/2015    TOE AMPUTATION Right 01/19/2017       Family History     Problem Relation (Age of Onset)    Cancer Mother    Cataracts Father    Diabetes Mother, Sister    Hypertension Mother, Father, Maternal Aunt    No Known Problems Brother, Sister, Sister, Maternal Uncle, Paternal Aunt, Paternal Uncle, Maternal Grandmother, Maternal Grandfather, Paternal Grandmother, Paternal Grandfather        Tobacco Use    Smoking status: Never Smoker    Smokeless tobacco: Never Used   Substance and Sexual Activity    Alcohol use: No    Drug use: No    Sexual activity: Yes     Partners: Female     Birth control/protection: Condom     Review of Systems   Constitutional: Positive for appetite change, fatigue and fever. Negative for chills.   HENT: Negative for congestion, facial  swelling, sore throat and trouble swallowing.    Eyes: Negative for photophobia and visual disturbance.   Respiratory: Negative for cough, chest tightness and shortness of breath.    Cardiovascular: Positive for leg swelling. Negative for chest pain and palpitations.   Gastrointestinal: Positive for nausea and vomiting. Negative for abdominal pain, constipation and diarrhea.   Endocrine: Negative for polydipsia, polyphagia and polyuria.   Genitourinary: Negative for difficulty urinating and flank pain.   Musculoskeletal: Negative for arthralgias.   Skin: Positive for color change and wound.   Allergic/Immunologic: Negative.    Neurological: Positive for weakness and numbness. Negative for dizziness.   Hematological: Negative for adenopathy. Does not bruise/bleed easily.   Psychiatric/Behavioral: Negative for agitation, behavioral problems, confusion and hallucinations.     Objective:     Vital Signs (Most Recent):  Temp: 98.1 °F (36.7 °C) (07/12/19 0945)  Pulse: (!) 111 (07/12/19 1630)  Resp: 20 (07/12/19 1630)  BP: (!) 171/97 (07/12/19 1630)  SpO2: 96 % (07/12/19 1630) Vital Signs (24h Range):  Temp:  [98.1 °F (36.7 °C)] 98.1 °F (36.7 °C)  Pulse:  [104-134] 111  Resp:  [12-29] 20  SpO2:  [96 %-100 %] 96 %  BP: ()/(72-97) 171/97     Weight: 93 kg (205 lb)  Body mass index is 27.05 kg/m².    Foot Exam    General  Orientation: alert and oriented to person, place, and time       Right Foot/Ankle     Inspection and Palpation  Ecchymosis: none  Tenderness: none   Swelling: dorsum   Skin Exam: ulcer; (plantar aspect of 4th MTP joint; Granular wound base; Negative probe to bone. No drainage or malodor noted. No eryhthema)    Neurovascular  Dorsalis pedis: 1+  Posterior tibial: 1+  Saphenous nerve sensation: absent  Tibial nerve sensation: absent  Superficial peroneal nerve sensation: absent  Deep peroneal nerve sensation: absent  Sural nerve sensation: absent      Left Foot/Ankle      Inspection and  Palpation  Ecchymosis: none  Tenderness: none   Swelling: dorsum   Skin Exam: drainage and ulcer; (ulcer noted at the lateral plantar aspect of 5th met head with purulent drainage, positive probe to bone, tracking distally to 5th digit and medially 2cm. Liquefying necrosis of fat, subQ, fascia to level of tendon noted.)    Neurovascular  Dorsalis pedis: 1+  Posterior tibial: 1+            Laboratory:  A1C:   Recent Labs   Lab 07/12/19  1023   HGBA1C >14.0*     CBC:   Recent Labs   Lab 07/12/19  1023   WBC 14.21*   RBC 4.21*   HGB 12.0*   HCT 37.7*   *   MCV 90   MCH 28.5   MCHC 31.8*     CMP:   Recent Labs   Lab 07/12/19  1023 07/12/19  1454   * 232*   CALCIUM 9.9 9.4   ALBUMIN 2.5* 2.1*   PROT 8.5*  --     142   K 3.8 3.7   CO2 23 23   CL 97 105   BUN 14 11   CREATININE 1.3 1.0   ALKPHOS 148*  --    ALT 5*  --    AST 7*  --    BILITOT 0.4  --      CRP:   Recent Labs   Lab 07/12/19  1037   CRP 95.8*     ESR:   Recent Labs   Lab 07/12/19  1037   SEDRATE >120*     Wound Cultures: No results for input(s): LABAERO in the last 4320 hours.  Microbiology Results (last 7 days)     Procedure Component Value Units Date/Time    Aerobic culture [[id number]] Collected:  07/12/19 1257    Order Status:  Sent Specimen:  Wound from Foot, Left Updated:  07/12/19 1311    Culture, Anaerobe [[id number]] Collected:  07/12/19 1257    Order Status:  Sent Specimen:  Wound from Foot, Left Updated:  07/12/19 1311    Blood culture x two cultures. Draw prior to antibiotics. [[id number]] Collected:  07/12/19 1044    Order Status:  Sent Specimen:  Blood from Peripheral, Antecubital, Left Updated:  07/12/19 1103    Blood culture x two cultures. Draw prior to antibiotics. [[id number]] Collected:  07/12/19 1044    Order Status:  Sent Specimen:  Blood from Peripheral, Antecubital, Right Updated:  07/12/19 1102        Specimen (12h ago, onward)    None          Diagnostic Results:  X-Ray: I have reviewed all pertinent results/findings  within the past 24 hours.     Xrays of the left foot show osteomyelitis of the 5th ray with focal lytic bone destruction involving the base of the proximal phalanx of the 5th toe and the head of the 5th metatarsal.      Clinical Findings:    7/12 Post debridement bedside in the ED    Ulcer noted on the left foot at the lateral plantar aspect of 5th MTP joint. Prior to bedside debridement, necrotic/fibrotic wound base with noted fluctuance and purulent drainage.  Liquefying necrosis of the skin, fat, subQ, fascia was noted down to the level of bone upon debridement. Wound measures 4.5cmx4.8rer8bm with tracking 2cm medially and distally. Positive probe to bone. Macerated wound edges. Purulent drainage. Positive malodor.         7/12 Ulcer prior to bedside debridement in the ED        7/12 Right foot ulcer at plantar 4th MTP joint. Granular, superficial wound base. Does not probe to bone. No purulence or active drainage, No edema, no erythema. No fluctuance or crepitus noted. Stable wound.             Assessment/Plan:     * Acute osteomyelitis  Left foot ulcer with exposed 5th metatarsal head and tracks distally to the 5th digit.   Elevated ESR, CRP, WBC  Left foot xray positive for osteomyelitis of partial 5th ray  Pending MRI results for surgical planning and intervention.  Continue IV Vanc/Zosyn per hospital medicine recs  ID consult pending    Foot ulcer  Left foot ulcer probes to bone and tracks distally to 5th digit and medially across the plantar midfoot. Bedside debridement was performed in the ED today and upon debridement, liquefactive necrosis of skin, fat, subQ, fascia was noted to the level of bone.  Excised as much necrotic soft tissue as possible in the ED and extensively rrigated left foot ulcer with 1L of normal saline solution diluted in betadine. Packed the wound with betadine soaked idoform packing strips, gauze, kerlix, ACE. Nursing instructed to do dressing changes daily.   I&D washout of left  foot with possible partial 5th ray amp is tentatively scheduled for [facility] morning 7/14.  Pending MRI results for surgical planning and intervention  Pending ID consult  Wound cultures obtained and pending results.   Podiatry will continue to monitor closely over the weekend.     Leukocytosis  Secondary to sepsis. Source is most likely from left foot ulcer infection with osteo. Podiatry planning surgical intervention with I&D washout and possible amp pending MRI results.  Pending ID consult  Managed per hospital medicine    Sepsis  Diabetic left foot ulcer with osteo is most likely the source of sepsis  I&D washout of  left foot ulcer and possible partial 5th ray amputation tentatively scheduled for [place] morning 7/14  Pending MRI results for surgical planning and intervention  Will continue to monitor patient over the weekend to evaluate if surgical intervention is needed sooner.     Type 2 diabetes mellitus with left eye affected by mild nonproliferative retinopathy without macular edema, without long-term current use of insulin  Managed per hospital medicine    Diabetic ketoacidosis without coma associated with type 2 diabetes mellitus  Managed per hospital medicine & endocrinology     Type 2 diabetes mellitus with hyperglycemia, with long-term current use of insulin  Managed by hospital medicine/endo team    Mixed hyperlipidemia  Managed per hospital medicine    Essential hypertension  Managed per hospital medicine        Thank you for your consult. I will follow-up with patient. Please contact us if you have any additional questions.    Romy Hi MD  Podiatry  [facility]

## 2019-07-12 NOTE — SUBJECTIVE & OBJECTIVE
Scheduled Meds:   atorvastatin  80 mg Oral Daily    magnesium sulfate IVPB  2 g Intravenous Once    piperacillin-tazobactam (ZOSYN) IVPB  4.5 g Intravenous Q8H    sodium phosphate IVPB  20.01 mmol Intravenous Once    [START ON 7/13/2019] vancomycin (VANCOCIN) IVPB  15 mg/kg Intravenous Q12H     Continuous Infusions:   sodium chloride 0.9% 150 mL/hr at 07/12/19 1540    insulin (HUMAN R) infusion (adults) 1 Units/hr (07/12/19 1502)     PRN Meds:acetaminophen, Dextrose 10% Bolus, Dextrose 10% Bolus, glucagon (human recombinant), glucose, glucose, ondansetron, sodium chloride 0.9%    Review of patient's allergies indicates:  No Known Allergies     Past Medical History:   Diagnosis Date    Actinomyces infection 1/17/2017    Right foot    Diabetic ketoacidosis without coma associated with type 2 diabetes mellitus 5/30/2017    Diabetic ulcer of right foot associated with type 2 diabetes mellitus 6/3/2015    Essential hypertension 6/6/2013    Group B streptococcal infection 1/13/2017    Mixed hyperlipidemia 8/12/2014    Shoulder impingement 8/12/2014    Subacute osteomyelitis of right foot 1/12/2017    Streptococcus agalactiae, Actinomyces odontolyticus    Traumatic amputation of fifth toe of right foot 7/2/2015    Type 2 diabetes mellitus with diabetic neuropathy, with long-term current use of insulin 5/3/2016    Ulcerative colitis, unspecified      Past Surgical History:   Procedure Laterality Date    AMPUTATION-TOE Right 6/5/2015    Performed by [facility]    AMPUTATION-TOE and Tendo achilles lengthening Right 1/19/2017    Performed by Ramirez Wilkes D[facility] at [facility] OR    INCISION AND DRAINAGE (I&D), FOOT Right 1/16/2017    [facility]formed by [facility] at [facility]    RESECTION-METATARSAL HEAD Right 1/16/2017    Performed by [facility] at [facility]    TOE AMPUTATION Right 06/05/2015    TOE AMPUTATION Right 01/19/2017       Family History     Problem Relation (Age of  Onset)    Cancer Mother    Cataracts Father    Diabetes Mother, Sister    Hypertension Mother, Father, Maternal Aunt    No Known Problems Brother, Sister, Sister, Maternal Uncle, Paternal Aunt, Paternal Uncle, Maternal Grandmother, Maternal Grandfather, Paternal Grandmother, Paternal Grandfather        Tobacco Use    Smoking status: Never Smoker    Smokeless tobacco: Never Used   Substance and Sexual Activity    Alcohol use: No    Drug use: No    Sexual activity: Yes     Partners: Female     Birth control/protection: Condom     Review of Systems   Constitutional: Positive for appetite change, fatigue and fever. Negative for chills.   HENT: Negative for congestion, facial swelling, sore throat and trouble swallowing.    Eyes: Negative for photophobia and visual disturbance.   Respiratory: Negative for cough, chest tightness and shortness of breath.    Cardiovascular: Positive for leg swelling. Negative for chest pain and palpitations.   Gastrointestinal: Positive for nausea and vomiting. Negative for abdominal pain, constipation and diarrhea.   Endocrine: Negative for polydipsia, polyphagia and polyuria.   Genitourinary: Negative for difficulty urinating and flank pain.   Musculoskeletal: Negative for arthralgias.   Skin: Positive for color change and wound.   Allergic/Immunologic: Negative.    Neurological: Positive for weakness and numbness. Negative for dizziness.   Hematological: Negative for adenopathy. Does not bruise/bleed easily.   Psychiatric/Behavioral: Negative for agitation, behavioral problems, confusion and hallucinations.     Objective:     Vital Signs (Most Recent):  Temp: 98.1 °F (36.7 °C) (07/12/19 0945)  Pulse: (!) 111 (07/12/19 1630)  Resp: 20 (07/12/19 1630)  BP: (!) 171/97 (07/12/19 1630)  SpO2: 96 % (07/12/19 1630) Vital Signs (24h Range):  Temp:  [98.1 °F (36.7 °C)] 98.1 °F (36.7 °C)  Pulse:  [104-134] 111  Resp:  [12-29] 20  SpO2:  [96 %-100 %] 96 %  BP: ()/(72-97) 171/97      Weight: 93 kg (205 lb)  Body mass index is 27.05 kg/m².    Foot Exam    General  Orientation: alert and oriented to person, place, and time       Right Foot/Ankle     Inspection and Palpation  Ecchymosis: none  Tenderness: none   Swelling: dorsum   Skin Exam: ulcer; (plantar aspect of 4th MTP joint; Granular wound base; Negative probe to bone. No drainage or malodor noted. No eryhthema)    Neurovascular  Dorsalis pedis: 1+  Posterior tibial: 1+  Saphenous nerve sensation: absent  Tibial nerve sensation: absent  Superficial peroneal nerve sensation: absent  Deep peroneal nerve sensation: absent  Sural nerve sensation: absent      Left Foot/Ankle      Inspection and Palpation  Ecchymosis: none  Tenderness: none   Swelling: dorsum   Skin Exam: drainage and ulcer; (ulcer noted at the lateral plantar aspect of 5th met head with purulent drainage, positive probe to bone, tracking distally to 5th digit and medially 2cm. Liquefying necrosis of fat, subQ, fascia to level of tendon noted.)    Neurovascular  Dorsalis pedis: 1+  Posterior tibial: 1+            Laboratory:  A1C:   Recent Labs   Lab 07/12/19  1023   HGBA1C >14.0*     CBC:   Recent Labs   Lab 07/12/19  1023   WBC 14.21*   RBC 4.21*   HGB 12.0*   HCT 37.7*   *   MCV 90   MCH 28.5   MCHC 31.8*     CMP:   Recent Labs   Lab 07/12/19  1023 07/12/19  1454   * 232*   CALCIUM 9.9 9.4   ALBUMIN 2.5* 2.1*   PROT 8.5*  --     142   K 3.8 3.7   CO2 23 23   CL 97 105   BUN 14 11   CREATININE 1.3 1.0   ALKPHOS 148*  --    ALT 5*  --    AST 7*  --    BILITOT 0.4  --      CRP:   Recent Labs   Lab 07/12/19  1037   CRP 95.8*     ESR:   Recent Labs   Lab 07/12/19  1037   SEDRATE >120*     Wound Cultures: No results for input(s): LABAERO in the last 4320 hours.  Microbiology Results (last 7 days)     Procedure Component Value Units Date/Time    Aerobic culture [[id number]] Collected:  07/12/19 1257    Order Status:  Sent Specimen:  Wound from Foot, Left  Updated:  07/12/19 1311    Culture, Anaerobe [[id number]] Collected:  07/12/19 1257    Order Status:  Sent Specimen:  Wound from Foot, Left Updated:  07/12/19 1311    Blood culture x two cultures. Draw prior to antibiotics. [[id number]] Collected:  07/12/19 1044    Order Status:  Sent Specimen:  Blood from Peripheral, Antecubital, Left Updated:  07/12/19 1103    Blood culture x two cultures. Draw prior to antibiotics. [[id number]] Collected:  07/12/19 1044    Order Status:  Sent Specimen:  Blood from Peripheral, Antecubital, Right Updated:  07/12/19 1102        Specimen (12h ago, onward)    None          Diagnostic Results:  X-Ray: I have reviewed all pertinent results/findings within the past 24 hours.     Xrays of the left foot show osteomyelitis of the 5th ray with focal lytic bone destruction involving the base of the proximal phalanx of the 5th toe and the head of the 5th metatarsal.      Clinical Findings:    7/12 Post debridement bedside in the ED    Ulcer noted on the left foot at the lateral plantar aspect of 5th MTP joint. Prior to bedside debridement, necrotic/fibrotic wound base with noted fluctuance and purulent drainage.  Liquefying necrosis of the skin, fat, subQ, fascia was noted down to the level of bone upon debridement. Wound measures [id number] with tracking 2cm medially and distally. Positive probe to bone. Macerated wound edges. Purulent drainage. Positive malodor.         7/12 Ulcer prior to bedside debridement in the ED        7/12 Right foot ulcer at plantar 4th MTP joint. Granular, superficial wound base. Does not probe to bone. No purulence or active drainage, No edema, no erythema. No fluctuance or crepitus noted. Stable wound.

## 2019-07-12 NOTE — ED TRIAGE NOTES
Patient reports to the ED today with reports of Emesis x4 days. Patient denies any sick relatives. Patient tachycardic 130s upon arrival

## 2019-07-12 NOTE — ED NOTES
Pt being transported per MRI by this nurse. No distress noted . Report already called. Will put in for pt transport to go upstairs when back from MRI.

## 2019-07-12 NOTE — ED NOTES
Pt glucose 378mg/dl . podiatry team at bedside assessing pt. Will start another IV and start insulin after they are done.

## 2019-07-12 NOTE — ED NOTES
Roxi Velasco ( Sister) [phone]  Jeff Velasco ( niece ) [phone]  Call family when placed upstairs

## 2019-07-12 NOTE — ANESTHESIA PREPROCEDURE EVALUATION
[facility]  Anesthesia Pre-Operative Evaluation         Patient Name: Indio Ryder Jr.  YOB: 1970  MRN: [id number]    SUBJECTIVE:     Pre-operative evaluation for Procedure(s) (LRB):  DEBRIDEMENT, FOOT, possible left 5th ray  amputation (Left)     07/12/2019    Indio Ryder Jr. is a 49 y.o. male w/ a significant PMHx of poorly controlled DM, HTN, and previous R foot osteomyelitis who presents w/ sepsis and DKA. Found to have significant L foot ulceration and infection.    Patient now presents for the above procedure(s).      LDA: None documented.    Prev airway: Placement Date: 01/16/17; Placement Time: 0703; Method of Intubation: Direct laryngoscopy; Inserted by: CRNA; Airway Device: Endotracheal Tube; Mask Ventilation: Easy; Intubated: Postinduction; Blade: Ogden #3; Airway Device Size: 7.5; Style: Cuffed; Cuff Inflation: Minimal occlusive pressure; Inflation Amount: 6; Placement Verified By: Auscultation, Capnometry, ETT Condensation; Grade: Grade I; Complicating Factors: None; Intubation Findings: Positive EtCO2, Bilateral breath sounds, Atraumatic/Condition of teeth unchanged;  Depth of Insertion: 22; Securment: Lips; Complications: None;     Patient Active Problem List   Diagnosis    Essential hypertension    Mixed hyperlipidemia    Traumatic amputation of fifth toe of right foot    Type 2 diabetes mellitus with diabetic neuropathy, with long-term current use of insulin    Diabetic ketoacidosis without coma associated with type 2 diabetes mellitus    Type 2 diabetes mellitus with left eye affected by mild nonproliferative retinopathy without macular edema, without long-term current use of insulin    Neck pain    Acute pain of left shoulder    Sepsis    Acute osteomyelitis    Leukocytosis    Anemia    Foot ulcer       Review of patient's allergies indicates:  No Known Allergies    Current Outpatient Medications:    Current Facility-Administered  Medications:     acetaminophen tablet 650 mg, 650 mg, Oral, Q6H PRN, Vicente Wick MD    atorvastatin tablet 80 mg, 80 mg, Oral, Daily, Vicente Wick MD, 80 mg at 07/12/19 1402    dextrose 10% (D10W) Bolus, 12.5 g, Intravenous, PRN, Vicente Wick MD    dextrose 10% (D10W) Bolus, 25 g, Intravenous, PRN, Vicente Wick MD    glucagon (human recombinant) injection 1 mg, 1 mg, Intramuscular, PRN, Vicente Wick MD    glucose chewable tablet 16 g, 16 g, Oral, PRN, Vicente Wick MD    glucose chewable tablet 24 g, 24 g, Oral, PRN, Vicente Wick MD    insulin regular (Humulin R) 100 Units in sodium chloride 0.9% 100 mL infusion, 4 Units/hr, Intravenous, Continuous, Trung Fall MD, Last Rate: 2 mL/hr at 07/12/19 1356, 2 Units/hr at 07/12/19 1356    ondansetron injection 4 mg, 4 mg, Intravenous, Q8H PRN, Vicente Wick MD    piperacillin-tazobactam 4.5 g in sodium chloride 0.9% 100 mL IVPB (ready to mix system), 4.5 g, Intravenous, Q8H, Trung Fall MD    sodium chloride 0.9% flush 10 mL, 10 mL, Intravenous, PRN, Vicente Wick MD    [START ON 7/13/2019] vancomycin 1.5 g in dextrose 5 % 250 mL IVPB (ready to mix), 15 mg/kg, Intravenous, Q12H, Trung Fall MD    vancomycin 2 g in 0.9% sodium chloride 500 mL IVPB, 2,000 mg, Intravenous, ED 1 Time, Trung Fall MD, Last Rate: 250 mL/hr at 07/12/19 1216, 2,000 mg at 07/12/19 1216    Current Outpatient Medications:     metFORMIN (GLUCOPHAGE) 500 MG tablet, Take 500 mg by mouth 2 (two) times daily with meals., Disp: , Rfl:     ONETOUCH DELICA LANCETS 33 gauge Misc, , Disp: , Rfl:     ONETOUCH ULTRA2 kit, , Disp: , Rfl:     Past Surgical History:   Procedure Laterality Date    AMPUTATION-TOE Right 6/5/2015    [facility]    AMPUTATION-TOE and Tendo achilles lengthening Right 1/19/2017    [facility]formed by [facility]kes [facility] at [facility]    INCISION AND DRAINAGE (I&D), FOOT Right  1/16/2017    Performed by [facility]kes [facility] at [facility] OR    RESECTION-METATARSAL HEAD Right 1/16/2017    Performed by [facility] at [facility]    TOE AMPUTATION Right 06/05/2015    TOE AMPUTATION Right 01/19/2017       Social History     Socioeconomic History    Marital status: Single     Spouse name: Not on file    Number of children: 0    Years of education: Not on file    Highest education level: Not on file   Occupational History     Employer: [employer]   Social Needs    Financial resource strain: Not on file    Food insecurity:     Worry: Not on file     Inability: Not on file    Transportation needs:     Medical: Not on file     Non-medical: Not on file   Tobacco Use    Smoking status: Never Smoker    Smokeless tobacco: Never Used   Substance and Sexual Activity    Alcohol use: No    Drug use: No    Sexual activity: Yes     Partners: Female     Birth control/protection: Condom   Lifestyle    Physical activity:     Days per week: Not on file     Minutes per session: Not on file    Stress: Not on file   Relationships    Social connections:     Talks on phone: Not on file     Gets together: Not on file     Attends [employer] service: Not on file     Active member of club or organization: Not on file     Attends meetings of clubs or organizations: Not on file     Relationship status: Not on file   Other Topics Concern    Not on file   Social History Narrative    Not on file       OBJECTIVE:     Vital Signs Range (Last 24H):  Temp:  [36.7 °C (98.1 °F)]   Pulse:  [104-134]   Resp:  [12-29]   BP: ()/(72-97)   SpO2:  [97 %-100 %]       Significant Labs:  Lab Results   Component Value Date    WBC 14.21 (H) 07/12/2019    HGB 12.0 (L) 07/12/2019    HCT 37.7 (L) 07/12/2019     (H) 07/12/2019    CHOL 186 10/11/2017    TRIG 73 10/11/2017    HDL 68 10/11/2017    ALT 5 (L) 07/12/2019    AST 7 (L) 07/12/2019     07/12/2019    K 3.8 07/12/2019    CL 97 07/12/2019    CREATININE  1.3 07/12/2019    BUN 14 07/12/2019    CO2 23 07/12/2019    TSH 1.136 03/01/2012    PSA 0.65 08/11/2014    INR 1.1 01/16/2017    HGBA1C >14.0 (H) 07/12/2019       Diagnostic Studies:     EKG: Sinus tachycardia with occasional Premature ventricular complexes  Rightward axis  Borderline Abnormal ECG  When compared with ECG of 30-MAY-2017 19:05,  Premature ventricular complexes are now Present  T wave inversion no longer evident in Inferior leads  Nonspecific T wave abnormality no longer evident in Anterior-lateral leads    2D ECHO:  Echo from 3/30/3019    1 - Normal left ventricular function (EF 58%).   2 - Normal diastolic function.   3 - Trivial to mild tricuspid regurgitation.     ASSESSMENT/PLAN:                                                                                                   Anesthesia Evaluation    I have reviewed the Patient Summary Reports.    I have reviewed the Nursing Notes.   I have reviewed the Medications.     Review of Systems  Anesthesia Hx:  No problems with previous Anesthesia Denies Hx of Anesthetic complications  History of prior surgery of interest to airway management or planning: (amputation of toe, I+D of foot, ) Previous anesthesia: General Denies Family Hx of Anesthesia complications.   Denies Personal Hx of Anesthesia complications.   Social:  Non-Smoker, No Alcohol Use    EENT/Dental:   denies chronic allergic rhinitis  Denies Otitis Media   Cardiovascular:   Hypertension    Pulmonary:   Denies Pneumonia    Hepatic/GI:   Ulcerative colitis   Neurological:   Headaches    Endocrine:   Diabetes        Physical Exam  General:  Well nourished    Airway/Jaw/Neck:  Airway Findings: Mouth Opening: Normal Tongue: Normal  General Airway Assessment: Adult  Mallampati: III  Improves to II with phonation.  TM Distance: Normal, at least 6 cm        Eyes/Ears/Nose:  EYES/EARS/NOSE FINDINGS: Normal   Dental:  Dental Findings: In tact   Chest/Lungs:  Chest/Lungs Clear     Heart/Vascular:  Heart Findings: Normal       Mental Status:  Mental Status Findings:  Alert and Oriented, Flat Affect         Anesthesia Plan  Type of Anesthesia, risks & benefits discussed:  Anesthesia Type:  MAC  Patient's Preference:   Intra-op Monitoring Plan: standard ASA monitors  Intra-op Monitoring Plan Comments:   Post Op Pain Control Plan: per primary service following discharge from PACU and peripheral nerve block  Post Op Pain Control Plan Comments:   Induction:   IV  Beta Blocker:  Patient is not currently on a Beta-Blocker (No further documentation required).       Informed Consent: Patient understands risks and agrees with Anesthesia plan.  Questions answered. Anesthesia consent signed with patient.  ASA Score: 3     Day of Surgery Review of History & Physical: I have interviewed and examined the patient. I have reviewed the patient's H&P dated: 7/12/19.           Ready For Surgery From Anesthesia Perspective.

## 2019-07-12 NOTE — ASSESSMENT & PLAN NOTE
Secondary to sepsis. Source is most likely from left foot ulcer infection with osteo. Podiatry planning surgical intervention with I&D washout and possible amp pending MRI results.  Pending ID consult  Managed per hospital medicine

## 2019-07-12 NOTE — HPI
Indio Ryder Jr is a 49 y.o male with PMH of Type 2 DM (poorly controlled with long term use of insulin), HTN, diabetic neuropathy, right foot diabetic ulcers, HLD,  Right foot osteomyelitis  presents to [facility] ED 7/12/2019 with chief complaint of vomiting x 4 days.    Podiatry service was consulted for ulcer on the left foot. Patient states he noticed the ulcer 2 weeks ago. He believes his compressive stockings caused the ulcer. Patient states he has a history of right foot ulcers and has not seen a podiatrist or gone to wound care because he recently lost his medical insurance. Patient has history of right foot osteomyelitis and is s/p right partial 5th ray amp and right partial 1st ray amp. Reports treating ulcers on both feet at home with neosporin. Denies any trauma or pedal pain. Reports he has been experiencing nausea, vomitting, fever, and chills for the past 2 weeks and is unable to keep any solid food or liquids down.

## 2019-07-12 NOTE — ASSESSMENT & PLAN NOTE
Left foot ulcer with exposed 5th metatarsal head and tracks distally to the 5th digit.   Elevated ESR, CRP, WBC  Left foot xray positive for osteomyelitis of partial 5th ray  Pending MRI results for surgical planning and intervention.  Continue IV Vanc/Zosyn per hospital medicine recs  ID consult pending

## 2019-07-12 NOTE — SUBJECTIVE & OBJECTIVE
Interval HPI:   Overnight events: admitted for DKA, found to have large L foot ulcer. Plan for OR per podiatry due to OM  Eatin%  Nausea: Yes  Hypoglycemia and intervention: No  Fever: No  TPN and/or TF: No  If yes, type of TF/TPN and rate: n/a    PMH, PSH, FH, SH updated and reviewed     ROS:    Review of Systems   Constitutional: Positive for fatigue. Negative for activity change and unexpected weight change.   HENT: Negative for sore throat and voice change.    Eyes: Negative for visual disturbance.   Respiratory: Negative for shortness of breath.    Cardiovascular: Negative for chest pain.   Gastrointestinal: Positive for nausea and vomiting. Negative for abdominal distention, abdominal pain and constipation.   Genitourinary: Negative for urgency.   Musculoskeletal: Positive for arthralgias.   Skin: Positive for wound. Negative for rash.   Neurological: Positive for weakness. Negative for headaches.   Psychiatric/Behavioral: Negative for confusion and sleep disturbance.       Current Medications and/or Treatments Impacting Glycemic Control  Immunotherapy:    Immunosuppressants     None        Steroids:   Hormones (From admission, onward)    None        Pressors:    Autonomic Drugs (From admission, onward)    None        Hyperglycemia/Diabetes Medications:   Antihyperglycemics (From admission, onward)    Start     Stop Route Frequency Ordered    19 1245  insulin regular (Humulin R) 100 Units in sodium chloride 0.9% 100 mL infusion  (INSULIN INFUSIONS)     Question:  Insulin Rate Adjustment (DO NOT MODIFY ANSWER)  Answer:  [email or web address]    -- IV Continuous 19 1132             PHYSICAL EXAMINATION:  Vitals:    19 1400   BP: (!) 168/92   Pulse: 108   Resp: 16   Temp:      Body mass index is 27.05 kg/m².    Physical Exam   Constitutional: He is oriented to person, place, and time. He appears well-developed and well-nourished. No  distress.   Mild lethargic   Eyes: Conjunctivae are normal. No scleral icterus.   Neck: Normal range of motion. No tracheal deviation present. No thyromegaly present.   Cardiovascular: Normal rate and normal heart sounds.   Pulmonary/Chest: Effort normal and breath sounds normal.   Abdominal: Soft. There is no tenderness. No hernia.   Musculoskeletal: Normal range of motion. He exhibits deformity. He exhibits no edema or tenderness.   L foot ulcer noted, now wrapped    Neurological: He is alert and oriented to person, place, and time.   Skin: Skin is warm. No erythema.   Psychiatric: He has a normal mood and affect. His behavior is normal.   Nursing note and vitals reviewed.

## 2019-07-12 NOTE — ASSESSMENT & PLAN NOTE
- DKA on admission due to noncompliance of insulin use combine with Osteomyelitis of foot  - BHOB was 4.1, A1C >14, Anion gap of 21, glucose 349  - Discuss with Dr Wick, hospital medicine to manage DKA, he voiced understanding, we recommend using DKA pathway      Recommend  - Consider using DKA pathway to help with orders  - Aggressive IVF, recommend checking renal function panel Q6 hr  - Once Bicarb >18, Anion gap is <10, Glucose <200 and Patient is able to tolerate oral food can convert to MDI at that time (we recommend Levemir 23U QHS and Novolog 7U TIDWM at that time, low dose correction scale)  - Can consider D5 1/2 NS  Fluid to maintain glucose until those parameter are met  - Recommend aggresively correcting electrolytes (K, Ma, Phos) during this time  - start clear liquid sugar free diet for now

## 2019-07-12 NOTE — ED NOTES
"Advised pt that will possibly need to straight cath for urine sample. Pt stated " I will use the urinal".   "

## 2019-07-12 NOTE — PROGRESS NOTES
Pharmacokinetic Initial Assessment: IV Vancomycin    Assessment/Plan:  1. Vancomycin 2000 mg IV x 1, then 1500 mg IV q12h  2. Trough prior to 4th dose, due @ 0000 on 7/14/19  3. Goal trough = 15-20 mcg/mL        Please contact pharmacy at extension [phone] with any questions regarding this assessment.     Thank you for the consult,   Rc PowellD., [facility]  [id number]     Patient brief summary:  Indio Ryder Jr. is a 49 y.o. male initiated on antimicrobial therapy with IV Vancomycin for treatment of suspected sepsis/osteomylelitis    Drug Allergies:   Review of patient's allergies indicates:  No Known Allergies    Actual Body Weight:   93 kg    Renal Function:   Estimated Creatinine Clearance: 76 mL/min (based on SCr of 1.3 mg/dL).,     CBC (last 72 hours):  Recent Labs   Lab Result Units 07/12/19  1023   WBC K/uL 14.21*   Hemoglobin g/dL 12.0*   Hematocrit % 37.7*   Platelets K/uL 380*   Gran% % 81.8*   Lymph% % 10.2*   Mono% % 7.3   Eosinophil% % 0.1   Basophil% % 0.2   Differential Method  Automated       Metabolic Panel (last 72 hours):  Recent Labs   Lab Result Units 07/12/19  1023 07/12/19  1037   Sodium mmol/L 141  --    Potassium mmol/L 3.8  --    Chloride mmol/L 97  --    CO2 mmol/L 23  --    Glucose mg/dL 349*  --    BUN, Bld mg/dL 14  --    Creatinine mg/dL 1.3  --    Albumin g/dL 2.5*  --    Total Bilirubin mg/dL 0.4  --    Alkaline Phosphatase U/L 148*  --    AST U/L 7*  --    ALT U/L 5*  --    Magnesium mg/dL  --  1.7       Drug levels (last 3 results):  No results for input(s): VANCOMYCINRA, VANCOMYCINPE, VANCOMYCINTR in the last 72 hours.    Microbiologic Results:  Microbiology Results (last 7 days)     Procedure Component Value Units Date/Time    Aerobic culture [[id number]] Collected:  07/12/19 1257    Order Status:  Sent Specimen:  Wound from Foot, Left Updated:  07/12/19 1311    Culture, Anaerobe [[id number]] Collected:  07/12/19 1257    Order Status:  Sent Specimen:  Wound from Foot, Left  Updated:  07/12/19 1311    Blood culture x two cultures. Draw prior to antibiotics. [[id number]] Collected:  07/12/19 1044    Order Status:  Sent Specimen:  Blood from Peripheral, Antecubital, Left Updated:  07/12/19 1103    Blood culture x two cultures. Draw prior to antibiotics. [[id number]] Collected:  07/12/19 1044    Order Status:  Sent Specimen:  Blood from Peripheral, Antecubital, Right Updated:  07/12/19 1102

## 2019-07-12 NOTE — HPI
Reason for Consult: Management of T2DM, Hyperglycemia, DKA    Surgical Procedure and Date: 7/14/19    Diabetes diagnosis year: >20 years, 1990s?    Home Diabetes Medications:  Metformin 500mg BID, Lantus 26U QHS    How often checking glucose at home? None  BG readings on regimen: n/a  Hypoglycemia on the regimen?  No  Missed doses on regimen?  No    Diabetes Complications include:     Hyperglycemia and Foot ulcer      Complicating diabetes co morbidities:  HTN      HPI:   Patient is a 49 y.o. male with a diagnosis of Type 2 DM (noncomplaince, skip Levemir 1-2x a week), HTN, PVD, hx of right foot osteomyelitis who was admitted to hospital medicine for DKA and L foot ulcer. He report  4-5 days of nausea and vomiting. Vomiting is non-bloody. He has not been able to keep anything down. Pt hasn't been administering his insulin during this time due to the fact that he has not been able to keep anything down. He was found to have a large open wound on L foot, pt recently lost his insurance and has been unable to follow up with wound care. Xray showed new Osteomyelitis of L foot.  Pt reported skipping his Lantus at home 1-2x a week, not on insulins with meal. Was on Trulicity but stopped due to lost of insurance. BHB was 4.1, A1C >14, Anion gap of 21, glucose 349. Endocrine was consulted for DKA, diabetes management.

## 2019-07-12 NOTE — ED PROVIDER NOTES
Encounter Date: 7/12/2019    SCRIBE #1 NOTE: I, Chris Bradford, am scribing for, and in the presence of, Dr. Fall.       History     Chief Complaint   Patient presents with    Vomiting     vomiting for past 4 days, denies sick contacts     49 y.o. M with PMHx of IDDM, right foot osteomyelitis presents with a chief complaint of vomiting. Pt has had 4-5 days of nausea and vomiting. Vomiting is non-bloody. It occurs immediately after eating and he has not been able to keep anything down. Associated with fevers but no objective temperature measurement. No abdominal pain. Pt hasn't been administering his insulin during this time due to the fact that he has not been able to keep anything down. He feels it is similar in quality to previous episodes of DKA. Pt recently lost his insurance and has been unable to follow up with wound care. He has been managing his right foot ulcer but over the past 2 weeks an ulcer on his left foot has developed. He denies any pain or injury. He reports ulcer on his foot developed secondary to using compression stockings. Glucose at triage was 346.    The history is provided by the patient.     Review of patient's allergies indicates:  No Known Allergies  Past Medical History:   Diagnosis Date    Actinomyces infection 1/17/2017    Right foot    Diabetic ketoacidosis without coma associated with type 2 diabetes mellitus 5/30/2017    Diabetic ulcer of right foot associated with type 2 diabetes mellitus 6/3/2015    Essential hypertension 6/6/2013    Group B streptococcal infection 1/13/2017    Mixed hyperlipidemia 8/12/2014    Shoulder impingement 8/12/2014    Subacute osteomyelitis of right foot 1/12/2017    Streptococcus agalactiae, Actinomyces odontolyticus    Traumatic amputation of fifth toe of right foot 7/2/2015    Type 2 diabetes mellitus with diabetic neuropathy, with long-term current use of insulin 5/3/2016    Ulcerative colitis, unspecified      Past Surgical History:    Procedure Laterality Date    AMPUTATION-TOE Right 6/5/2015    Performed by [facility] Manuel [facility] at [facility]    AMPUTATION-TOE and Tendo achilles lengthening Right 1/19/2017    Performed by Ramirez Wilkes DPM at [facility] OR    INCISION AND DRAINAGE (I&D), FOOT Right 1/16/2017    Performed by [facility]kes [facility] at [facility]    RESECTION-METATARSAL HEAD Right 1/16/2017    Performed by [facility]kes [facility] at [facility]    TOE AMPUTATION Right 06/05/2015    TOE AMPUTATION Right 01/19/2017     Family History   Adopted: Yes   Problem Relation Age of Onset    Hypertension Mother     Cancer Mother         breast    Diabetes Mother     Hypertension Father     Cataracts Father     Diabetes Sister     No Known Problems Brother     No Known Problems Sister     No Known Problems Sister     Hypertension Maternal Aunt     No Known Problems Maternal Uncle     No Known Problems Paternal Aunt     No Known Problems Paternal Uncle     No Known Problems Maternal Grandmother     No Known Problems Maternal Grandfather     No Known Problems Paternal Grandmother     No Known Problems Paternal Grandfather     Amblyopia Neg Hx     Blindness Neg Hx     Glaucoma Neg Hx     Macular degeneration Neg Hx     Retinal detachment Neg Hx     Strabismus Neg Hx     Stroke Neg Hx     Thyroid disease Neg Hx      Social History     Tobacco Use    Smoking status: Never Smoker    Smokeless tobacco: Never Used   Substance Use Topics    Alcohol use: No    Drug use: No     Review of Systems   Constitutional: Positive for fever.   HENT: Negative for sore throat.    Respiratory: Negative for shortness of breath.    Cardiovascular: Negative for chest pain.   Gastrointestinal: Positive for nausea and vomiting.   Genitourinary: Negative for dysuria.   Musculoskeletal: Negative for back pain.   Skin: Positive for wound. Negative for rash.   Neurological: Negative for weakness.   Hematological: Does not bruise/bleed easily.       Physical  Exam     Initial Vitals [07/12/19 0945]   BP Pulse Resp Temp SpO2   95/72 (!) 134 18 98.1 °F (36.7 °C) 97 %      MAP       --         Physical Exam    Nursing note and vitals reviewed.  Constitutional: He appears well-developed and well-nourished. He is not diaphoretic. No distress.   HENT:   Head: Normocephalic and atraumatic.   Eyes: EOM are normal. Pupils are equal, round, and reactive to light. Right eye exhibits no discharge. Left eye exhibits no discharge. No scleral icterus.   Neck: Normal range of motion. Neck supple. No JVD present.   Cardiovascular: Regular rhythm and intact distal pulses. Tachycardia present.  Exam reveals no gallop and no friction rub.    No murmur heard.  Pulmonary/Chest: Breath sounds normal. No respiratory distress. He has no wheezes. He has no rhonchi. He has no rales. He exhibits no tenderness.   Abdominal: Soft. Bowel sounds are normal. He exhibits no distension and no mass. There is no tenderness. There is no rebound and no guarding.   Musculoskeletal: Normal range of motion. He exhibits no edema or tenderness.   Lymphadenopathy:     He has no cervical adenopathy.   Neurological: He is alert and oriented to person, place, and time. He has normal strength. No sensory deficit.   Skin: Skin is warm and dry. Capillary refill takes less than 2 seconds.   Left foot: 6cm ulcer with exposed subcutaneous fat and eschar with foul smelling, purulent drainage overlying lateral aspect of left 5th MTP.     Right foot: 1.5cm clean based ulcer at base of right 5th MTP joint. Right great toe and second toe surgically amputated.        Psychiatric: He has a normal mood and affect.             ED Course   Procedures  Labs Reviewed   CBC W/ AUTO DIFFERENTIAL - Abnormal; Notable for the following components:       Result Value    WBC 14.21 (*)     RBC 4.21 (*)     Hemoglobin 12.0 (*)     Hematocrit 37.7 (*)     Mean Corpuscular Hemoglobin Conc 31.8 (*)     Platelets 380 (*)     Gran # (ANC) 11.6 (*)      Immature Grans (Abs) 0.06 (*)     Gran% 81.8 (*)     Lymph% 10.2 (*)     All other components within normal limits   COMPREHENSIVE METABOLIC PANEL - Abnormal; Notable for the following components:    Glucose 349 (*)     Total Protein 8.5 (*)     Albumin 2.5 (*)     Alkaline Phosphatase 148 (*)     AST 7 (*)     ALT 5 (*)     Anion Gap 21 (*)     All other components within normal limits   BETA - HYDROXYBUTYRATE, SERUM - Abnormal; Notable for the following components:    Beta-Hydroxybutyrate 4.1 (*)     All other components within normal limits   C-REACTIVE PROTEIN - Abnormal; Notable for the following components:    CRP 95.8 (*)     All other components within normal limits   LIPASE - Abnormal; Notable for the following components:    Lipase <3 (*)     All other components within normal limits   POCT GLUCOSE - Abnormal; Notable for the following components:    POCT Glucose 346 (*)     All other components within normal limits   ISTAT PROCEDURE - Abnormal; Notable for the following components:    POC PH 7.310 (*)     POC PCO2 60.7 (*)     POC HCO3 30.6 (*)     POC SATURATED O2 85 (*)     POC TCO2 32 (*)     All other components within normal limits   CULTURE, BLOOD   CULTURE, BLOOD   TROPONIN I   LACTIC ACID, PLASMA   MAGNESIUM   TROPONIN I   URINALYSIS, REFLEX TO URINE CULTURE   SEDIMENTATION RATE   LACTIC ACID, PLASMA   HEMOGLOBIN A1C   POCT GLUCOSE MONITORING CONTINUOUS   POCT GLUCOSE MONITORING CONTINUOUS        ECG Results          EKG 12-lead (In process)  Result time 07/12/19 10:32:15    In process by Interface, Lab In [facility] (07/12/19 10:32:15)                 Narrative:    Test Reason : R73.9,    Vent. Rate : 111 BPM     Atrial Rate : 111 BPM     P-R Int : 134 ms          QRS Dur : 090 ms      QT Int : 364 ms       P-R-T Axes : 081 092 054 degrees     QTc Int : 495 ms    Sinus tachycardia with occasional Premature ventricular complexes  Rightward axis  Borderline Abnormal ECG  When compared with ECG of  30-MAY-2017 19:05,  Premature ventricular complexes are now Present  T wave inversion no longer evident in Inferior leads  Nonspecific T wave abnormality no longer evident in Anterior-lateral leads    Referred By: [email or web address]   SELF           Confirmed By:                             Imaging Results           X-Ray Foot Complete Left (Final result)  Result time 07/12/19 11:06:16    Final result by Chandan Montana MD (07/12/19 11:06:16)                 Impression:      Abnormal examination with findings strongly suggesting osteomyelitis involving the base of the proximal phalanx of the left 5th toe and likely the head of the 5th metatarsal as well.    This report was flagged in Epic as abnormal.      Electronically signed by: Chandan Montana MD  Date:    07/12/2019  Time:    11:06             Narrative:    EXAMINATION:  XR FOOT COMPLETE 3 VIEW LEFT    TECHNIQUE:  Three views of the left foot were obtained, with AP, lateral, and oblique projections submitted.    COMPARISON:  No relevant comparison examinations are currently available.    FINDINGS:  Focal soft tissue loss/irregularity involving the region of the left 5th MTP joint is observed, with gas present within the soft tissues at this level, the findings likely related to a clinically evident ulceration.  There is focal lytic bone destruction involving the base of the proximal phalanx of the 5th toe, and possibly involving the head of the 5th metatarsal as well, this radiographic appearance strongly suggesting osteomyelitis.  The remaining osseous structures appear intact, with no evidence of recent fracture and no additional areas of focal lytic bone destruction noted.  Some arterial vascular calcification in the soft tissues about the ankle is incidentally observed.                               X-Ray Chest AP Portable (Final result)  Result time 07/12/19 10:48:19    Final result by Chandan Montana MD (07/12/19 10:48:19)                 Impression:      No significant  intrathoracic abnormality.  Allowing for differences in projection and a poorer inspiratory depth level on the current examination, there has been no significant detrimental interval change in the appearance of the chest since 05/03/2016.      Electronically signed by: Chandan Montana MD  Date:    07/12/2019  Time:    10:48             Narrative:    EXAMINATION:  XR CHEST AP PORTABLE    CLINICAL HISTORY:  hyperglycemia;    COMPARISON:  Comparison is made to the most recent prior chest radiograph, dated 05/03/2016.    FINDINGS:  Heart size is normal, as is the appearance of the pulmonary vascularity.  Lung zones are clear, and free of significant airspace consolidation or volume loss.  No pleural fluid.  No abnormal mediastinal widening.  No pneumothorax.  Incidental note is made of some spurring at the right acromioclavicular joint level.                                 Medical Decision Making:   History:   Old Medical Records: I decided to obtain old medical records.  Initial Assessment:   49 y.o. M with PMHx of IDDM, right foot osteomyelitis presents with a chief complaint of vomiting.  Differential Diagnosis:   Hyperglycemia, DKA, HONKS, bacteremia, sepsis, cellulitis, osteomyelitis, pancreatitis, ACS    Independently Interpreted Test(s):   I have ordered and independently interpreted X-rays - see prior notes.  I have ordered and independently interpreted EKG Reading(s) - see prior notes  Clinical Tests:   Lab Tests: Ordered and Reviewed  Radiological Study: Ordered and Reviewed  Medical Tests: Ordered and Reviewed  Sepsis Perfusion Assessment: I attest, a sepsis perfusion exam was performed within 6 hours of Septic Shock presentation, following fluid resuscitation.  ED Management:  Will administer empiric treatment for sepsis secondary to cellulitis with Vanc and zosyn. Will administer 30cc/kg bolus. Will obtain labs and cultures. Will obtain chest xray and foot xray.     Reassessment: pH 7.31. B-OH elevated at 4.1.  CBC with leukocytosis of 14.2. X-ray of left foot suggests osteomyelitis of the base of the proximal phalanx of the left 5th toe and head of 5th metatarsal. Chest x-ray without acute process. Pt is tachycardic with a leukocytosis. He is being treated empirically for sepsis with Vanc and zosyn. He will require admission for sepsis 2/2 osteomyelitis. CMP reveals a glucose of 349,  Anion gap 21. This is consistent with DKA. Will initiate insulin gtt at 4U/hr and admit to medicine. Lactic acid 1.5 CRP 96.             Scribe Attestation:   Scribe #1: I performed the above scribed service and the documentation accurately describes the services I performed. I attest to the accuracy of the note.    Attending Attestation:         Attending Critical Care:   Critical Care Times:   Direct Patient Care (initial evaluation, reassessments, and time considering the case)................................................................25 minutes.   Additional History from reviewing old medical records or taking additional history from the family, EMS, PCP, etc.......................5 minutes.   Ordering, Reviewing, and Interpreting Diagnostic Studies...............................................................................................................5 minutes.   Documentation..................................................................................................................................................................................5 minutes.   Consultation with other Physicians. .................................................................................................................................................5 minutes.   ==============================================================  · Total Critical Care Time - exclusive of procedural time: 45 minutes.  ==============================================================  Critical care was necessary to treat or prevent imminent or life-threatening  deterioration of the following conditions: sepsis and endocrine crisis.     Physician Attestation for Scribe:      Comments: I, Dr. Trung Fall, personally performed the services described in this documentation. All medical record entries made by the scribe were at my direction and in my presence.  I have reviewed the chart and agree that the record reflects my personal performance and is accurate and complete. Trung Fall MD.  12:01 PM 07/12/2019\                 Clinical Impression:       ICD-10-CM ICD-9-CM   1. Sepsis, due to unspecified organism A41.9 038.9     995.91   2. Hyperglycemia R73.9 790.29   3. Left foot infection L08.9 686.9   4. Type 2 diabetes mellitus with ketoacidosis without coma, with long-term current use of insulin E11.10 250.10    Z79.4 V58.67                                Trung Fall MD  07/12/19 1146       Trung Fall MD  07/12/19 1202

## 2019-07-12 NOTE — H&P
[facility] Medicine  History & Physical    Patient Name: Indio Ryder Jr.  MRN: [id number]  Admission Date: 7/12/2019  Attending Physician: Trung Fall MD   Primary Care Provider: Lorena Thakur MD    [facility] Medicine Team: [facility] Vicente Wick MD     Patient information was obtained from patient, past medical records and ER records.     Subjective:     Principal Problem:Acute osteomyelitis    Chief Complaint:   Chief Complaint   Patient presents with    Vomiting     vomiting for past 4 days, denies sick contacts        HPI: Indio Ryder Jr. is a 49 y.o. male with a PMH of T2DM (poorly  controlled, with long term insulin use) and HTN who presented to the ED on 7/12/2019 diagnosed with  Vomiting (vomiting for past 4 days, denies sick contacts)   Oriented x3 accompanied by daughter at the bedside.  Mentions that he wears lower extremity stockings for edema. Reportedly formed a new ulcer on the left lateral aspect of the foot about 10 days back.  had 4-5 days of nausea and vomiting. Vomiting is non-bloody, No abdominal pain..  It occurs immediately after eating and he has not been able to keep anything down. Associated with fevers and chills but no objective temperature measurement.  At baseline only takes insulin once a day stop taking more than a week because of nausea and vomiting and poor oral intake.  Does not check fingersticks regularly at home. recently lost his insurance and has been unable to follow up with wound care.  His last follow-up with primary care provider and podiatrist was about 2 years.  He has been managing his right foot ulcer at home with dressing but over the past 2 weeks an ulcer on his left foot has developed. He denies any pain or injury- has chronic numbness of bilateral lower extremities and hands from diabetic neuropathy.  Found to be in DKA in the emergency department with beta hydroxybutyrate elevated at 4.1.  Started on insulin  "marcell      Past Medical History:   Diagnosis Date    Actinomyces infection 1/17/2017    Right foot    Diabetic ketoacidosis without coma associated with type 2 diabetes mellitus 5/30/2017    Diabetic ulcer of right foot associated with type 2 diabetes mellitus 6/3/2015    Essential hypertension 6/6/2013    Group B streptococcal infection 1/13/2017    Mixed hyperlipidemia 8/12/2014    Shoulder impingement 8/12/2014    Subacute osteomyelitis of right foot 1/12/2017    Streptococcus agalactiae, Actinomyces odontolyticus    Traumatic amputation of fifth toe of right foot 7/2/2015    Type 2 diabetes mellitus with diabetic neuropathy, with long-term current use of insulin 5/3/2016    Ulcerative colitis, unspecified        Past Surgical History:   Procedure Laterality Date    AMPUTATION-TOE Right 6/5/2015    [facility] William Manuel [facility] at [facility] OR    AMPUTATION-TOE and Tendo achilles lengthening Right 1/19/2017    Performed by Ramirez Wilkes DPM at [facility] OR    INCISION AND DRAINAGE (I&D), FOOT Right 1/16/2017    Performed by Ramirez Wilkes [facility] at [facility] OR    RESECTION-METATARSAL HEAD Right 1/16/2017    Performed by Ramirez Wilkes D[facility] at [facility] OR    TOE AMPUTATION Right 06/05/2015    TOE AMPUTATION Right 01/19/2017       Review of patient's allergies indicates:  No Known Allergies    No current facility-administered medications on file prior to encounter.      Current Outpatient Medications on File Prior to Encounter   Medication Sig    metFORMIN (GLUCOPHAGE) 500 MG tablet Take 500 mg by mouth 2 (two) times daily with meals.    ONETOUCH DELICA LANCETS 33 gauge Misc     ONETOUCH ULTRA2 kit     [DISCONTINUED] aspirin (ECOTRIN) 81 MG EC tablet Take 1 tablet (81 mg total) by mouth once daily. 11 am    [DISCONTINUED] atorvastatin (LIPITOR) 80 MG tablet 1 tab po q day    [DISCONTINUED] BD ULTRA-FINE SASCHA PEN NEEDLES 32 gauge x 5/32" Ndle Uses 1x daily, on daily insulin injections    [DISCONTINUED] " blood sugar diagnostic Strp 1 strip by Misc.(Non-Drug; Combo Route) route once daily.    [DISCONTINUED] dulaglutide (TRULICITY) 0.75 mg/0.5 mL PnIj Inject 0.5 mLs (0.75 mg total) into the skin every 7 days.    [DISCONTINUED] insulin glargine (LANTUS SOLOSTAR) 100 unit/mL (3 mL) InPn pen Inject 26 Units into the skin every evening. 11 PM    [DISCONTINUED] lancets (ACCU-CHEK SOFTCLIX LANCETS) Misc 1 Device by Misc.(Non-Drug; Combo Route) route once daily.    [DISCONTINUED] lisinopril 10 MG tablet Take 1 tablet (10 mg total) by mouth once daily.    [DISCONTINUED] tizanidine (ZANAFLEX) 4 MG tablet Take 1 tablet (4 mg total) by mouth 2 (two) times daily as needed (muscle aches).     Family History     Problem Relation (Age of Onset)    Cancer Mother    Cataracts Father    Diabetes Mother, Sister    Hypertension Mother, Father, Maternal Aunt    No Known Problems Brother, Sister, Sister, Maternal Uncle, Paternal Aunt, Paternal Uncle, Maternal Grandmother, Maternal Grandfather, Paternal Grandmother, Paternal Grandfather        Tobacco Use    Smoking status: Never Smoker    Smokeless tobacco: Never Used   Substance and Sexual Activity    Alcohol use: No    Drug use: No    Sexual activity: Yes     Partners: Female     Birth control/protection: Condom     Review of Systems   Constitutional: Positive for activity change, appetite change (Poor appetite for the last and), chills, fatigue, fever ( weight loss) and unexpected weight change.   HENT: Negative for congestion, ear discharge, ear pain, facial swelling and sore throat.    Eyes: Negative for pain, discharge and itching.   Respiratory: Positive for shortness of breath ( at rest and exertion ). Negative for cough, chest tightness and wheezing.    Cardiovascular: Positive for leg swelling ( chronic). Negative for chest pain and palpitations.   Gastrointestinal: Positive for vomiting. Negative for abdominal distention, abdominal pain, blood in stool, constipation  and diarrhea.   Endocrine: Negative for cold intolerance.   Genitourinary: Negative for dysuria, hematuria and urgency.   Musculoskeletal: Negative for arthralgias, gait problem, myalgias, neck pain and neck stiffness.   Skin: Negative for color change, pallor and rash.   Allergic/Immunologic: Negative for environmental allergies.   Neurological: Positive for weakness and numbness ( bilateral lower extremities and hands attributes to diabetic neuropathy). Negative for dizziness, syncope, light-headedness and headaches.   Hematological: Negative for adenopathy.   Psychiatric/Behavioral: Negative for agitation, behavioral problems and confusion.     Objective:     Vital Signs (Most Recent):  Temp: 98.1 °F (36.7 °C) (07/12/19 0945)  Pulse: 108 (07/12/19 1300)  Resp: 12 (07/12/19 1300)  BP: (Abnormal) 162/89 (07/12/19 1300)  SpO2: 98 % (07/12/19 1300) Vital Signs (24h Range):  Temp:  [98.1 °F (36.7 °C)] 98.1 °F (36.7 °C)  Pulse:  [104-134] 108  Resp:  [12-29] 12  SpO2:  [97 %-100 %] 98 %  BP: ()/(72-97) 162/89     Weight: 93 kg (205 lb)  Body mass index is 27.05 kg/m².    Physical Exam   Constitutional: He is oriented to person, place, and time. He appears well-developed and well-nourished.   HENT:   Head: Normocephalic and atraumatic.   Mouth/Throat: Oropharynx is clear and moist. No oropharyngeal exudate.   Eyes: Pupils are equal, round, and reactive to light. Conjunctivae and EOM are normal. Right eye exhibits no discharge. Left eye exhibits no discharge. No scleral icterus.   Neck: Normal range of motion. Neck supple. No JVD present. No tracheal deviation present. No thyromegaly present.   Cardiovascular: Normal rate, regular rhythm and normal heart sounds. Exam reveals no gallop and no friction rub.   No murmur heard.  Pulmonary/Chest: Effort normal and breath sounds normal. No stridor. No respiratory distress. He has no wheezes. He has no rales.   Abdominal: Soft. Bowel sounds are normal. He exhibits no  distension and no mass. There is no tenderness. There is no guarding.   Musculoskeletal: Normal range of motion. He exhibits edema.   Lymphadenopathy:     He has no cervical adenopathy.   Neurological: He is oriented to person, place, and time. No cranial nerve deficit.   Able to move upper and lower extremities without limitation   Skin: Skin is warm and dry.   Left foot: 6cm ulcer with exposed subcutaneous fat and eschar with foul smelling, purulent drainage overlying lateral aspect of left 5th MTP.     Right foot: 1.5cm clean based ulcer at base of right 5th MTP joint. Right great toe and second toe surgically amputated.     Psychiatric: He has a normal mood and affect. His behavior is normal.   Nursing note and vitals reviewed.        CRANIAL NERVES     CN III, IV, VI   Pupils are equal, round, and reactive to light.  Extraocular motions are normal.       Significant Labs:   A1C: No results for input(s): HGBA1C in the last 4320 hours.  ABGs:   Recent Labs   Lab 07/12/19  1036   PH 7.310*   PCO2 60.7*   HCO3 30.6*   POCSATURATED 85*   BE 4     Bilirubin:   Recent Labs   Lab 07/12/19  1023   BILITOT 0.4     Blood Culture: No results for input(s): LABBLOO in the last 48 hours.  BMP:   Recent Labs   Lab 07/12/19  1023 07/12/19  1037   *  --      --    K 3.8  --    CL 97  --    CO2 23  --    BUN 14  --    CREATININE 1.3  --    CALCIUM 9.9  --    MG  --  1.7     CBC:   Recent Labs   Lab 07/12/19  1023   WBC 14.21*   HGB 12.0*   HCT 37.7*   *     CMP:   Recent Labs   Lab 07/12/19  1023      K 3.8   CL 97   CO2 23   *   BUN 14   CREATININE 1.3   CALCIUM 9.9   PROT 8.5*   ALBUMIN 2.5*   BILITOT 0.4   ALKPHOS 148*   AST 7*   ALT 5*   ANIONGAP 21*   EGFRNONAA >60.0     Cardiac Markers: No results for input(s): CKMB, MYOGLOBIN, BNP, TROPISTAT in the last 48 hours.  Coagulation: No results for input(s): PT, INR, APTT in the last 48 hours.  Lactic Acid:   Recent Labs   Lab 07/12/19  1037    LACTATE 1.5     Lipase:   Recent Labs   Lab 07/12/19  1037   LIPASE <3*     Lipid Panel: No results for input(s): CHOL, HDL, LDLCALC, TRIG, CHOLHDL in the last 48 hours.  Magnesium:   Recent Labs   Lab 07/12/19  1037   MG 1.7     Pathology Results  (Last 10 years)    None        POCT Glucose:   Recent Labs   Lab 07/12/19  0949 07/12/19  1234   POCTGLUCOSE 346* 378*     Prealbumin: No results for input(s): PREALBUMIN in the last 48 hours.  Respiratory Culture: No results for input(s): GSRESP, RESPIRATORYC in the last 48 hours.  Troponin:   Recent Labs   Lab 07/12/19  1023   TROPONINI <0.006     TSH: No results for input(s): TSH in the last 4320 hours.  Urine Culture: No results for input(s): LABURIN in the last 48 hours.  Urine Studies: No results for input(s): COLORU, APPEARANCEUA, PHUR, SPECGRAV, PROTEINUA, GLUCUA, KETONESU, BILIRUBINUA, OCCULTUA, NITRITE, UROBILINOGEN, LEUKOCYTESUR, RBCUA, WBCUA, BACTERIA, SQUAMEPITHEL, HYALINECASTS in the last 48 hours.    Invalid input(s): WRIGHTSUR  All pertinent labs within the past 24 hours have been reviewed.    Significant Imaging:   Imaging Results           X-Ray Foot Complete Left (Final result)  Result time 07/12/19 11:06:16    Final result by Chandan Montana MD (07/12/19 11:06:16)             Impression:      Abnormal examination with findings strongly suggesting osteomyelitis involving the base of the proximal phalanx of the left 5th toe and likely the head of the 5th metatarsal as well.    This report was flagged in Epic as abnormal.      Electronically signed by: Chandan Montana MD  Date:    07/12/2019  Time:    11:06           Narrative:    EXAMINATION:  XR FOOT COMPLETE 3 VIEW LEFT    TECHNIQUE:  Three views of the left foot were obtained, with AP, lateral, and oblique projections submitted.    COMPARISON:  No relevant comparison examinations are currently available.    FINDINGS:  Focal soft tissue loss/irregularity involving the region of the left 5th MTP joint is  observed, with gas present within the soft tissues at this level, the findings likely related to a clinically evident ulceration.  There is focal lytic bone destruction involving the base of the proximal phalanx of the 5th toe, and possibly involving the head of the 5th metatarsal as well, this radiographic appearance strongly suggesting osteomyelitis.  The remaining osseous structures appear intact, with no evidence of recent fracture and no additional areas of focal lytic bone destruction noted.  Some arterial vascular calcification in the soft tissues about the ankle is incidentally observed.                             X-Ray Chest AP Portable (Final result)  Result time 07/12/19 10:48:19    Final result by Chandan Montana MD (07/12/19 10:48:19)             Impression:      No significant intrathoracic abnormality.  Allowing for differences in projection and a poorer inspiratory depth level on the current examination, there has been no significant detrimental interval change in the appearance of the chest since 05/03/2016.      Electronically signed by: Chandan Montana MD  Date:    07/12/2019  Time:    10:48           Narrative:    EXAMINATION:  XR CHEST AP PORTABLE    CLINICAL HISTORY:  hyperglycemia;    COMPARISON:  Comparison is made to the most recent prior chest radiograph, dated 05/03/2016.    FINDINGS:  Heart size is normal, as is the appearance of the pulmonary vascularity.  Lung zones are clear, and free of significant airspace consolidation or volume loss.  No pleural fluid.  No abnormal mediastinal widening.  No pneumothorax.  Incidental note is made of some spurring at the right acromioclavicular joint level.                            Estimated Creatinine Clearance: 76 mL/min (based on SCr of 1.3 mg/dL).     EKG - Sinus tachycardia @111BPM  with occasional Premature ventricular complexes  Rightward axis    Assessment/Plan:     Active Diagnoses:    Diagnosis Date Noted POA    PRINCIPAL PROBLEM:  Acute  osteomyelitis [M86.10] x-ray left foot - strongly suggesting osteomyelitis involving the base of the proximal phalanx of the left 5th toe and likely the head of the 5th metatarsal as well.  Obtain MRI the foot without contrast     recently lost his insurance and has been unable to follow up with wound care.  His last follow-up with primary care provider and podiatrist was about 2 years.  He has been managing his right foot ulcer at home with dressing but over the past 2 weeks an ulcer on his left foot has developed. He denies any pain or injury- has chronic numbness of bilateral lower extremities and hands from diabetic neuropathy.  Started on IV Zosyn and vanc.  Podiatry and Infectious Disease consult 07/12/2019 Yes    Sepsis [A41.9] secondary to diabetic foot ulcer.  Elevated ESR greater than 120 As above 07/12/2019 Yes    Leukocytosis [D72.829] 14 secondary to sepsis 07/12/2019 Unknown    Anemia [D64.9] hemoglobin at 12, normocytic.  Monitor 07/12/2019 Unknown    Type 2 diabetes mellitus with left eye affected by mild nonproliferative retinopathy without macular edema, without long-term current use of insulin [E11.3292]At baseline only takes insulin once a day stop taking more than a week because of nausea and vomiting and poor oral intake.  Does not check fingersticks regularly at home. recently lost his insurance.  Obtain HbA1c elevated greater than 14.  Continue insulin drip.  Endocrine evaluation 08/11/2017 Yes     Chronic    Diabetic ketoacidosis without coma associated with type 2 diabetes mellitus [E11.10]  Found to be in DKA in the emergency department with beta hydroxybutyrate elevated at 4.1.  Started on insulin drip.  Received normal saline in the emergency department 05/30/2017 Unknown    Type 2 diabetes mellitus with diabetic neuropathy, with long-term current use of insulin [E11.40, Z79.4] 05/03/2016 Not Applicable     Chronic    Mixed hyperlipidemia [E78.2] continue with Lipitor 08/12/2014  Yes    Essential hypertension [I10] blood pressure controlled without medications.  Monitor  06/06/2013 Yes     Chronic      Problems Resolved During this Admission:     VTE Risk Mitigation (From admission, onward)        Ordered     IP VTE LOW RISK PATIENT  Once      07/12/19 1159     Place sequential compression device  Until discontinued      07/12/19 1159            Vicente Wick MD  Department of Hospital Medicine   [facility]

## 2019-07-12 NOTE — ASSESSMENT & PLAN NOTE
Diabetic left foot ulcer with osteo is most likely the source of sepsis  I&D washout of  left foot ulcer and possible partial 5th ray amputation tentatively scheduled for Sunday morning 7/14  Pending MRI results for surgical planning and intervention  Will continue to monitor patient over the weekend to evaluate if surgical intervention is needed sooner.

## 2019-07-12 NOTE — PROGRESS NOTES
Pharmacist Renal Dose Adjustment Note    Indio Ryder Jr. is a 49 y.o. male being treated with the medication Piperacillin/tazobactam    Patient Data:    Vital Signs (Most Recent):  Temp: 98.1 °F (36.7 °C) (07/12/19 0945)  Pulse: 108 (07/12/19 1300)  Resp: 12 (07/12/19 1300)  BP: (!) 162/89 (07/12/19 1300)  SpO2: 98 % (07/12/19 1300)   Vital Signs (72h Range):  Temp:  [98.1 °F (36.7 °C)]   Pulse:  [104-134]   Resp:  [12-29]   BP: ()/(72-97)   SpO2:  [97 %-100 %]      Recent Labs   Lab 07/12/19  1023   CREATININE 1.3     Serum creatinine: 1.3 mg/dL 07/12/19 1023  Estimated creatinine clearance: 76 mL/min    Piperacillin/tazobactam 4.5 g IV q6h will be changed to 4.5 g IV q8h    Pharmacist's Name: ANGELINE ARCINIEGA  Pharmacist's Extension: [phone]

## 2019-07-13 PROBLEM — B95.1 BACTEREMIA DUE TO GROUP B STREPTOCOCCUS: Status: ACTIVE | Noted: 2019-07-13

## 2019-07-13 PROBLEM — R78.81 BACTEREMIA DUE TO GROUP B STREPTOCOCCUS: Status: ACTIVE | Noted: 2019-07-13

## 2019-07-13 LAB
ALBUMIN SERPL BCP-MCNC: 1.7 G/DL (ref 3.5–5.2)
ALBUMIN SERPL BCP-MCNC: 2 G/DL (ref 3.5–5.2)
ALBUMIN SERPL BCP-MCNC: 2.1 G/DL (ref 3.5–5.2)
ALBUMIN SERPL BCP-MCNC: 2.1 G/DL (ref 3.5–5.2)
ALP SERPL-CCNC: 108 U/L (ref 55–135)
ALP SERPL-CCNC: 109 U/L (ref 55–135)
ALP SERPL-CCNC: 124 U/L (ref 55–135)
ALP SERPL-CCNC: 94 U/L (ref 55–135)
ALT SERPL W/O P-5'-P-CCNC: <5 U/L (ref 10–44)
ANION GAP SERPL CALC-SCNC: 11 MMOL/L (ref 8–16)
ANION GAP SERPL CALC-SCNC: 9 MMOL/L (ref 8–16)
AST SERPL-CCNC: 5 U/L (ref 10–40)
AST SERPL-CCNC: 6 U/L (ref 10–40)
AST SERPL-CCNC: 7 U/L (ref 10–40)
AST SERPL-CCNC: 8 U/L (ref 10–40)
BASOPHILS # BLD AUTO: 0.04 K/UL (ref 0–0.2)
BASOPHILS NFR BLD: 0.3 % (ref 0–1.9)
BILIRUB SERPL-MCNC: 0.3 MG/DL (ref 0.1–1)
BUN SERPL-MCNC: 5 MG/DL (ref 6–20)
BUN SERPL-MCNC: 6 MG/DL (ref 6–20)
BUN SERPL-MCNC: 8 MG/DL (ref 6–20)
BUN SERPL-MCNC: 9 MG/DL (ref 6–20)
CALCIUM SERPL-MCNC: 7.7 MG/DL (ref 8.7–10.5)
CALCIUM SERPL-MCNC: 9 MG/DL (ref 8.7–10.5)
CALCIUM SERPL-MCNC: 9.2 MG/DL (ref 8.7–10.5)
CALCIUM SERPL-MCNC: 9.3 MG/DL (ref 8.7–10.5)
CHLORIDE SERPL-SCNC: 103 MMOL/L (ref 95–110)
CHLORIDE SERPL-SCNC: 103 MMOL/L (ref 95–110)
CHLORIDE SERPL-SCNC: 105 MMOL/L (ref 95–110)
CHLORIDE SERPL-SCNC: 109 MMOL/L (ref 95–110)
CO2 SERPL-SCNC: 23 MMOL/L (ref 23–29)
CO2 SERPL-SCNC: 25 MMOL/L (ref 23–29)
CO2 SERPL-SCNC: 28 MMOL/L (ref 23–29)
CO2 SERPL-SCNC: 31 MMOL/L (ref 23–29)
CREAT SERPL-MCNC: 0.8 MG/DL (ref 0.5–1.4)
CREAT SERPL-MCNC: 0.9 MG/DL (ref 0.5–1.4)
CREAT SERPL-MCNC: 0.9 MG/DL (ref 0.5–1.4)
CREAT SERPL-MCNC: 1 MG/DL (ref 0.5–1.4)
DIFFERENTIAL METHOD: ABNORMAL
EOSINOPHIL # BLD AUTO: 0 K/UL (ref 0–0.5)
EOSINOPHIL NFR BLD: 0.2 % (ref 0–8)
ERYTHROCYTE [DISTWIDTH] IN BLOOD BY AUTOMATED COUNT: 12.8 % (ref 11.5–14.5)
EST. GFR  (AFRICAN AMERICAN): >60 ML/MIN/1.73 M^2
EST. GFR  (NON AFRICAN AMERICAN): >60 ML/MIN/1.73 M^2
GLUCOSE SERPL-MCNC: 104 MG/DL (ref 70–110)
GLUCOSE SERPL-MCNC: 200 MG/DL (ref 70–110)
GLUCOSE SERPL-MCNC: 232 MG/DL (ref 70–110)
GLUCOSE SERPL-MCNC: 239 MG/DL (ref 70–110)
HCT VFR BLD AUTO: 37.8 % (ref 40–54)
HGB BLD-MCNC: 11.2 G/DL (ref 14–18)
IMM GRANULOCYTES # BLD AUTO: 0.06 K/UL (ref 0–0.04)
IMM GRANULOCYTES NFR BLD AUTO: 0.5 % (ref 0–0.5)
LYMPHOCYTES # BLD AUTO: 1.8 K/UL (ref 1–4.8)
LYMPHOCYTES NFR BLD: 13.9 % (ref 18–48)
MAGNESIUM SERPL-MCNC: 2 MG/DL (ref 1.6–2.6)
MCH RBC QN AUTO: 27.9 PG (ref 27–31)
MCHC RBC AUTO-ENTMCNC: 29.6 G/DL (ref 32–36)
MCV RBC AUTO: 94 FL (ref 82–98)
MONOCYTES # BLD AUTO: 1.2 K/UL (ref 0.3–1)
MONOCYTES NFR BLD: 9.4 % (ref 4–15)
NEUTROPHILS # BLD AUTO: 9.6 K/UL (ref 1.8–7.7)
NEUTROPHILS NFR BLD: 75.7 % (ref 38–73)
NRBC BLD-RTO: 0 /100 WBC
PHOSPHATE SERPL-MCNC: 3.2 MG/DL (ref 2.7–4.5)
PLATELET # BLD AUTO: 329 K/UL (ref 150–350)
PMV BLD AUTO: 9.9 FL (ref 9.2–12.9)
POCT GLUCOSE: 203 MG/DL (ref 70–110)
POCT GLUCOSE: 218 MG/DL (ref 70–110)
POCT GLUCOSE: 219 MG/DL (ref 70–110)
POCT GLUCOSE: 230 MG/DL (ref 70–110)
POCT GLUCOSE: 239 MG/DL (ref 70–110)
POCT GLUCOSE: 261 MG/DL (ref 70–110)
POCT GLUCOSE: 266 MG/DL (ref 70–110)
POCT GLUCOSE: 268 MG/DL (ref 70–110)
POCT GLUCOSE: 78 MG/DL (ref 70–110)
POCT GLUCOSE: 96 MG/DL (ref 70–110)
POTASSIUM SERPL-SCNC: 3.4 MMOL/L (ref 3.5–5.1)
POTASSIUM SERPL-SCNC: 3.4 MMOL/L (ref 3.5–5.1)
POTASSIUM SERPL-SCNC: 3.6 MMOL/L (ref 3.5–5.1)
POTASSIUM SERPL-SCNC: 4 MMOL/L (ref 3.5–5.1)
PROT SERPL-MCNC: 6 G/DL (ref 6–8.4)
PROT SERPL-MCNC: 7.4 G/DL (ref 6–8.4)
PROT SERPL-MCNC: 7.4 G/DL (ref 6–8.4)
PROT SERPL-MCNC: 7.5 G/DL (ref 6–8.4)
RBC # BLD AUTO: 4.01 M/UL (ref 4.6–6.2)
SODIUM SERPL-SCNC: 141 MMOL/L (ref 136–145)
SODIUM SERPL-SCNC: 142 MMOL/L (ref 136–145)
SODIUM SERPL-SCNC: 143 MMOL/L (ref 136–145)
SODIUM SERPL-SCNC: 143 MMOL/L (ref 136–145)
WBC # BLD AUTO: 12.65 K/UL (ref 3.9–12.7)

## 2019-07-13 PROCEDURE — 84100 ASSAY OF PHOSPHORUS: CPT

## 2019-07-13 PROCEDURE — 83735 ASSAY OF MAGNESIUM: CPT

## 2019-07-13 PROCEDURE — 20600001 HC STEP DOWN PRIVATE ROOM

## 2019-07-13 PROCEDURE — 99233 PR SUBSEQUENT HOSPITAL CARE,LEVL III: ICD-10-PCS | Mod: ,,, | Performed by: PHYSICIAN ASSISTANT

## 2019-07-13 PROCEDURE — 99232 SBSQ HOSP IP/OBS MODERATE 35: CPT | Mod: ,,, | Performed by: INTERNAL MEDICINE

## 2019-07-13 PROCEDURE — 25000003 PHARM REV CODE 250: Performed by: PHYSICIAN ASSISTANT

## 2019-07-13 PROCEDURE — S5571 INSULIN DISPOS PEN 3 ML: HCPCS | Performed by: HOSPITALIST

## 2019-07-13 PROCEDURE — 85025 COMPLETE CBC W/AUTO DIFF WBC: CPT

## 2019-07-13 PROCEDURE — 63600175 PHARM REV CODE 636 W HCPCS: Performed by: HOSPITALIST

## 2019-07-13 PROCEDURE — 87040 BLOOD CULTURE FOR BACTERIA: CPT

## 2019-07-13 PROCEDURE — 99233 PR SUBSEQUENT HOSPITAL CARE,LEVL III: ICD-10-PCS | Mod: ,,, | Performed by: HOSPITALIST

## 2019-07-13 PROCEDURE — 80053 COMPREHEN METABOLIC PANEL: CPT

## 2019-07-13 PROCEDURE — 99233 SBSQ HOSP IP/OBS HIGH 50: CPT | Mod: ,,, | Performed by: HOSPITALIST

## 2019-07-13 PROCEDURE — 99232 PR SUBSEQUENT HOSPITAL CARE,LEVL II: ICD-10-PCS | Mod: ,,, | Performed by: INTERNAL MEDICINE

## 2019-07-13 PROCEDURE — 36415 COLL VENOUS BLD VENIPUNCTURE: CPT

## 2019-07-13 PROCEDURE — 99233 SBSQ HOSP IP/OBS HIGH 50: CPT | Mod: ,,, | Performed by: PHYSICIAN ASSISTANT

## 2019-07-13 PROCEDURE — 63600175 PHARM REV CODE 636 W HCPCS: Performed by: EMERGENCY MEDICINE

## 2019-07-13 PROCEDURE — 25000003 PHARM REV CODE 250: Performed by: HOSPITALIST

## 2019-07-13 PROCEDURE — 80053 COMPREHEN METABOLIC PANEL: CPT | Mod: 91

## 2019-07-13 PROCEDURE — 63600175 PHARM REV CODE 636 W HCPCS: Performed by: PHYSICIAN ASSISTANT

## 2019-07-13 RX ORDER — IBUPROFEN 200 MG
16 TABLET ORAL
Status: DISCONTINUED | OUTPATIENT
Start: 2019-07-13 | End: 2019-07-13

## 2019-07-13 RX ORDER — CALCIUM CARBONATE 200(500)MG
1000 TABLET,CHEWABLE ORAL 2 TIMES DAILY PRN
Status: DISCONTINUED | OUTPATIENT
Start: 2019-07-13 | End: 2019-08-02 | Stop reason: HOSPADM

## 2019-07-13 RX ORDER — INSULIN ASPART 100 [IU]/ML
5 INJECTION, SOLUTION INTRAVENOUS; SUBCUTANEOUS
Status: DISCONTINUED | OUTPATIENT
Start: 2019-07-14 | End: 2019-07-15

## 2019-07-13 RX ORDER — METOCLOPRAMIDE HYDROCHLORIDE 5 MG/ML
5 INJECTION INTRAMUSCULAR; INTRAVENOUS EVERY 6 HOURS PRN
Status: DISCONTINUED | OUTPATIENT
Start: 2019-07-13 | End: 2019-07-13

## 2019-07-13 RX ORDER — GLUCAGON 1 MG
1 KIT INJECTION
Status: DISCONTINUED | OUTPATIENT
Start: 2019-07-13 | End: 2019-07-20

## 2019-07-13 RX ORDER — POTASSIUM CHLORIDE 20 MEQ/1
40 TABLET, EXTENDED RELEASE ORAL ONCE
Status: COMPLETED | OUTPATIENT
Start: 2019-07-13 | End: 2019-07-13

## 2019-07-13 RX ORDER — INSULIN ASPART 100 [IU]/ML
0-5 INJECTION, SOLUTION INTRAVENOUS; SUBCUTANEOUS
Status: DISCONTINUED | OUTPATIENT
Start: 2019-07-13 | End: 2019-07-13

## 2019-07-13 RX ORDER — INSULIN ASPART 100 [IU]/ML
0-4 INJECTION, SOLUTION INTRAVENOUS; SUBCUTANEOUS
Status: DISCONTINUED | OUTPATIENT
Start: 2019-07-13 | End: 2019-07-15

## 2019-07-13 RX ORDER — HYDRALAZINE HYDROCHLORIDE 25 MG/1
25 TABLET, FILM COATED ORAL EVERY 8 HOURS PRN
Status: DISCONTINUED | OUTPATIENT
Start: 2019-07-13 | End: 2019-08-02 | Stop reason: HOSPADM

## 2019-07-13 RX ORDER — INSULIN ASPART 100 [IU]/ML
6 INJECTION, SOLUTION INTRAVENOUS; SUBCUTANEOUS
Status: DISCONTINUED | OUTPATIENT
Start: 2019-07-14 | End: 2019-07-13

## 2019-07-13 RX ORDER — IBUPROFEN 200 MG
24 TABLET ORAL
Status: DISCONTINUED | OUTPATIENT
Start: 2019-07-13 | End: 2019-07-13

## 2019-07-13 RX ORDER — INSULIN ASPART 100 [IU]/ML
7 INJECTION, SOLUTION INTRAVENOUS; SUBCUTANEOUS
Status: DISCONTINUED | OUTPATIENT
Start: 2019-07-13 | End: 2019-07-13

## 2019-07-13 RX ORDER — ONDANSETRON 2 MG/ML
4 INJECTION INTRAMUSCULAR; INTRAVENOUS EVERY 6 HOURS PRN
Status: DISCONTINUED | OUTPATIENT
Start: 2019-07-13 | End: 2019-07-13

## 2019-07-13 RX ORDER — ONDANSETRON 2 MG/ML
4 INJECTION INTRAMUSCULAR; INTRAVENOUS EVERY 6 HOURS PRN
Status: DISCONTINUED | OUTPATIENT
Start: 2019-07-13 | End: 2019-07-17

## 2019-07-13 RX ORDER — GLUCAGON 1 MG
1 KIT INJECTION
Status: DISCONTINUED | OUTPATIENT
Start: 2019-07-13 | End: 2019-07-13

## 2019-07-13 RX ORDER — IBUPROFEN 200 MG
24 TABLET ORAL
Status: DISCONTINUED | OUTPATIENT
Start: 2019-07-13 | End: 2019-07-20

## 2019-07-13 RX ORDER — IBUPROFEN 200 MG
16 TABLET ORAL
Status: DISCONTINUED | OUTPATIENT
Start: 2019-07-13 | End: 2019-07-20

## 2019-07-13 RX ORDER — ENOXAPARIN SODIUM 100 MG/ML
40 INJECTION SUBCUTANEOUS EVERY 24 HOURS
Status: DISCONTINUED | OUTPATIENT
Start: 2019-07-13 | End: 2019-08-02 | Stop reason: HOSPADM

## 2019-07-13 RX ORDER — AMLODIPINE BESYLATE 5 MG/1
5 TABLET ORAL DAILY
Status: DISCONTINUED | OUTPATIENT
Start: 2019-07-13 | End: 2019-07-23

## 2019-07-13 RX ADMIN — PIPERACILLIN AND TAZOBACTAM 4.5 G: 4; .5 INJECTION, POWDER, LYOPHILIZED, FOR SOLUTION INTRAVENOUS; PARENTERAL at 01:07

## 2019-07-13 RX ADMIN — INSULIN ASPART 7 UNITS: 100 INJECTION, SOLUTION INTRAVENOUS; SUBCUTANEOUS at 11:07

## 2019-07-13 RX ADMIN — ENOXAPARIN SODIUM 40 MG: 100 INJECTION SUBCUTANEOUS at 04:07

## 2019-07-13 RX ADMIN — PIPERACILLIN AND TAZOBACTAM 4.5 G: 4; .5 INJECTION, POWDER, LYOPHILIZED, FOR SOLUTION INTRAVENOUS; PARENTERAL at 05:07

## 2019-07-13 RX ADMIN — INSULIN ASPART 7 UNITS: 100 INJECTION, SOLUTION INTRAVENOUS; SUBCUTANEOUS at 08:07

## 2019-07-13 RX ADMIN — ACETAMINOPHEN 650 MG: 325 TABLET ORAL at 10:07

## 2019-07-13 RX ADMIN — HYDRALAZINE HYDROCHLORIDE 25 MG: 25 TABLET, FILM COATED ORAL at 05:07

## 2019-07-13 RX ADMIN — POTASSIUM CHLORIDE 40 MEQ: 1500 TABLET, EXTENDED RELEASE ORAL at 05:07

## 2019-07-13 RX ADMIN — POTASSIUM CHLORIDE 40 MEQ: 1500 TABLET, EXTENDED RELEASE ORAL at 04:07

## 2019-07-13 RX ADMIN — VANCOMYCIN HYDROCHLORIDE 1500 MG: 1.5 INJECTION, POWDER, LYOPHILIZED, FOR SOLUTION INTRAVENOUS at 03:07

## 2019-07-13 RX ADMIN — AMLODIPINE BESYLATE 5 MG: 5 TABLET ORAL at 12:07

## 2019-07-13 RX ADMIN — PIPERACILLIN AND TAZOBACTAM 4.5 G: 4; .5 INJECTION, POWDER, LYOPHILIZED, FOR SOLUTION INTRAVENOUS; PARENTERAL at 10:07

## 2019-07-13 RX ADMIN — ONDANSETRON 4 MG: 2 INJECTION INTRAMUSCULAR; INTRAVENOUS at 05:07

## 2019-07-13 RX ADMIN — PROMETHAZINE HYDROCHLORIDE 12.5 MG: 25 INJECTION INTRAMUSCULAR; INTRAVENOUS at 09:07

## 2019-07-13 RX ADMIN — ONDANSETRON 4 MG: 2 INJECTION INTRAMUSCULAR; INTRAVENOUS at 10:07

## 2019-07-13 RX ADMIN — INSULIN DETEMIR 23 UNITS: 100 INJECTION, SOLUTION SUBCUTANEOUS at 06:07

## 2019-07-13 RX ADMIN — CALCIUM CARBONATE (ANTACID) CHEW TAB 500 MG 1000 MG: 500 CHEW TAB at 05:07

## 2019-07-13 NOTE — HPI
This is a 49 year old male with a PMH of DM (poorly controlled, with long term insulin use) and HTN who presented to the ED with nausea, vomiting, and foot ulcer. He reports 4-5 days of nausea and vomiting. Denies abdominal pain.  he also has associated chills but has not taken his temperature.  He has been managing his right foot ulcer at home with dressing but over the past 2 weeks an ulcer on his left foot has developed. He denies any pain or injury- has chronic numbness of bilateral lower extremities and hands from diabetic neuropathy.  Found to be in DKA in the emergency department and started on insulin drip. Per podiatry, the wound was necrotic with fluctuance and purulence down to bone. Patient febrile to 101.4 with leukocytosis of 14,000. His blood cultures now positive for group B strep. Wound culture also with GBS. Pt on empiric vancomycin and zosyn. ID consulted for recs.     Patient reports he still is feeling bad. No more vomiting. Family at bedside.

## 2019-07-13 NOTE — PROGRESS NOTES
Patient completed MRI exam, tolerated procedure well, transported back to ED in NAD, report given left in the care of ED nurse Anaid.

## 2019-07-13 NOTE — CONSULTS
[facility]  Infectious Disease  Consult Note    Patient Name: Indio Ryder Jr.  MRN: [id number]  Admission Date: 7/12/2019  Hospital Length of Stay: 1 days  Attending Physician: Vicente Wick MD  Primary Care Provider: Lorena Thakur MD     Isolation Status: No active isolations    Patient information was obtained from patient and past medical records.      Consults  Assessment/Plan:     Bacteremia due to group B Streptococcus  Admitted in DKA.  Blood cultures positive for GBS. Wound cultures also with GBS. Per podiatry note, there is necrosis, purulence tracking down to bone. Plan for tentative I&D tomorrow in OR with podiatry  - febrile to 101.4; leukocytosis of 14K  - MRI shows osteo of the 5th metatarsal and 5th toe    Plan:  1. Continue zosyn for now (for GBS as well as other organisms as wound likely polymicrobial)  2. D/c vancomycin given no MRSA growth; would re-start if MRSA is isolated from cultures  3. Repeat blood cultures until cleared  4. Recommend I&D/ possible amputation with podiatry for source control  5. Will de-escalate antibiotics once culture results are final  6. Anticipate outpatient parenteral therapy; hold off on PICC placement until blood cultures clear  7. DKA managed by primary team        Thank you for your consult. I will follow-up with patient. Please contact us if you have any additional questions.  RAMILA Rick Pager: [phone]  Infectious Disease  [facility]    Subjective:     Principal Problem: Acute osteomyelitis    HPI: This is a 49 year old male with a PMH of DM (poorly controlled, with long term insulin use) and HTN who presented to the ED with nausea, vomiting, and foot ulcer. He reports 4-5 days of nausea and vomiting. Denies abdominal pain.  he also has associated chills but has not taken his temperature.  He has been managing his right foot ulcer at home with dressing but over the past 2 weeks an ulcer on his left foot  has developed. He denies any pain or injury- has chronic numbness of bilateral lower extremities and hands from diabetic neuropathy.  Found to be in DKA in the emergency department and started on insulin drip. Per podiatry, the wound was necrotic with fluctuance and purulence down to bone. Patient febrile to 101.4 with leukocytosis of 14,000. His blood cultures now positive for group B strep. Wound culture also with GBS. Pt on empiric vancomycin and zosyn. ID consulted for recs.     Patient reports he still is feeling bad. No more vomiting. Family at bedside.       Past Medical History:   Diagnosis Date    Actinomyces infection 1/17/2017    Right foot    Diabetic ketoacidosis without coma associated with type 2 diabetes mellitus 5/30/2017    Diabetic ulcer of right foot associated with type 2 diabetes mellitus 6/3/2015    Essential hypertension 6/6/2013    Group B streptococcal infection 1/13/2017    Mixed hyperlipidemia 8/12/2014    Shoulder impingement 8/12/2014    Subacute osteomyelitis of right foot 1/12/2017    Streptococcus agalactiae, Actinomyces odontolyticus    Traumatic amputation of fifth toe of right foot 7/2/2015    Type 2 diabetes mellitus with diabetic neuropathy, with long-term current use of insulin 5/3/2016    Ulcerative colitis, unspecified        Past Surgical History:   Procedure Laterality Date    AMPUTATION-TOE Right 6/5/2015    Performed by [facility] at [facility] OR    AMPUTATION-TOE and Tendo achilles lengthening Right 1/19/2017    Performed by Ramirez Wilkes DPM at [facility] OR    INCISION AND DRAINAGE (I&D), FOOT Right 1/16/2017    Performed by [facility]kes [facility] at [facility] OR    RESECTION-METATARSAL HEAD Right 1/16/2017    Performed by [facility]kes [facility] at [facility]    TOE AMPUTATION Right 06/05/2015    TOE AMPUTATION Right 01/19/2017       Review of patient's allergies indicates:  No Known Allergies    Medications:  Medications Prior to Admission   Medication Sig     metFORMIN (GLUCOPHAGE) 500 MG tablet Take 500 mg by mouth 2 (two) times daily with meals.    ONETOUCH DELICA LANCETS 33 gauge Misc     ONETOUCH ULTRA2 kit      Antibiotics (From admission, onward)    Start     Stop Route Frequency Ordered    07/12/19 1900  piperacillin-tazobactam 4.5 g in sodium chloride 0.9% 100 mL IVPB (ready to mix system)      -- IV Every 8 hours (non-standard times) 07/12/19 1351        Antifungals (From admission, onward)    None        Antivirals (From admission, onward)    None           Immunization History   Administered Date(s) Administered    Pneumococcal Conjugate - 13 Valent 06/09/2017    Pneumococcal Polysaccharide - 23 Valent 08/12/2014    Tdap 06/09/2017       Family History     Problem Relation (Age of Onset)    Cancer Mother    Cataracts Father    Diabetes Mother, Sister    Hypertension Mother, Father, Maternal Aunt    No Known Problems Brother, Sister, Sister, Maternal Uncle, Paternal Aunt, Paternal Uncle, Maternal Grandmother, Maternal Grandfather, Paternal Grandmother, Paternal Grandfather        Social History     Socioeconomic History    Marital status: Single     Spouse name: Not on file    Number of children: 0    Years of education: Not on file    Highest education level: Not on file   Occupational History     Employer: [employer]   Social Needs    Financial resource strain: Not on file    Food insecurity:     Worry: Not on file     Inability: Not on file    Transportation needs:     Medical: Not on file     Non-medical: Not on file   Tobacco Use    Smoking status: Never Smoker    Smokeless tobacco: Never Used   Substance and Sexual Activity    Alcohol use: No    Drug use: No    Sexual activity: Yes     Partners: Female     Birth control/protection: Condom   Lifestyle    Physical activity:     Days per week: Not on file     Minutes per session: Not on file    Stress: Not on file   Relationships    Social connections:     Talks on phone: Not on file      Gets together: Not on file     Attends Baptism service: Not on file     Active member of club or organization: Not on file     Attends meetings of clubs or organizations: Not on file     Relationship status: Not on file   Other Topics Concern    Not on file   Social History Narrative    Not on file     Review of Systems   Constitutional: Positive for chills, diaphoresis and fever.   Respiratory: Negative for cough and shortness of breath.    Cardiovascular: Negative for chest pain and leg swelling.   Gastrointestinal: Negative for abdominal pain, constipation, diarrhea, nausea and vomiting.   Genitourinary: Negative for dysuria, frequency and hematuria.   Musculoskeletal: Negative for back pain and myalgias.   Skin: Positive for color change and wound (right foot). Negative for rash.   Neurological: Negative for dizziness, light-headedness and headaches.   Psychiatric/Behavioral: Negative for agitation and behavioral problems. The patient is not nervous/anxious.      Objective:     Vital Signs (Most Recent):  Temp: 99 °F (37.2 °C) (07/13/19 1158)  Pulse: 104 (07/13/19 1158)  Resp: 18 (07/13/19 1158)  BP: (!) 183/94 (07/13/19 1158)  SpO2: 98 % (07/13/19 1158) Vital Signs (24h Range):  Temp:  [98.2 °F (36.8 °C)-101.4 °F (38.6 °C)] 99 °F (37.2 °C)  Pulse:  [] 104  Resp:  [12-29] 18  SpO2:  [91 %-99 %] 98 %  BP: (124-183)/(74-97) 183/94     Weight: 89.3 kg (196 lb 13.9 oz)  Body mass index is 25.97 kg/m².    Estimated Creatinine Clearance: 98.8 mL/min (based on SCr of 1 mg/dL).    Physical Exam   Constitutional: He is oriented to person, place, and time. He appears well-developed and well-nourished. No distress.   Cardiovascular: Normal rate and regular rhythm.   No murmur heard.  Pulmonary/Chest: Effort normal and breath sounds normal. No respiratory distress.   Abdominal: Soft. Bowel sounds are normal. He exhibits no distension.   Musculoskeletal: Normal range of motion. He exhibits no edema.   Dressing  in place to right foot.    Neurological: He is alert and oriented to person, place, and time.   Skin: Skin is warm and dry.   Psychiatric: He has a normal mood and affect. His behavior is normal.       Significant Labs:   Blood Culture:   Recent Labs   Lab 07/12/19  1044 07/13/19  0358   LABBLOO Gram stain mary bottle: Gram positive cocci in chains resembling Strep  Results called to and read back by:Farrah Ayala RN 07/12/2019    22:47  Gram stain aer bottle: Gram positive cocci in chains resembling Strep   07/13/2019  06:46  STREPTOCOCCUS AGALACTIAE (GROUP B)  Beta-hemolytic streptococci are routinely susceptible to   penicillins,cephalosporins and carbapenems.  For susceptibility see order #[id number]  *  Gram stain mary bottle: Gram positive cocci in chains resembling Strep  Results called to and read back by:Farrah Ayala RN 07/12/2019    22:46  Gram stain aer bottle: Gram positive cocci in chains resembling Strep   07/13/2019  06:43  STREPTOCOCCUS AGALACTIAE (GROUP B)  Susceptibility pending  Beta-hemolytic streptococci are routinely susceptible to   penicillins,cephalosporins and carbapenems.  * No Growth to date  No Growth to date     CBC:   Recent Labs   Lab 07/12/19  1023 07/13/19  0358   WBC 14.21* 12.65   HGB 12.0* 11.2*   HCT 37.7* 37.8*   * 329     CMP:   Recent Labs   Lab 07/12/19  2032 07/13/19  0029 07/13/19  0358    143 141   K 3.5 3.4* 4.0    109 105   CO2 25 23 25   * 232* 239*   BUN 9 8 9   CREATININE 1.0 0.9 1.0   CALCIUM 9.0 7.7* 9.0   PROT 7.5 6.0 7.4   ALBUMIN 2.1* 1.7* 2.1*   BILITOT 0.3 0.3 0.3   ALKPHOS 113 94 124   AST 6* 5* 8*   ALT <5* <5* <5*   ANIONGAP 16 11 11   EGFRNONAA >60.0 >60.0 >60.0     Wound Culture:   Recent Labs   Lab 07/12/19  1257   LABAERO STREPTOCOCCUS AGALACTIAE (GROUP B)  Few  Beta-hemolytic streptococci are routinely susceptible to   penicillins,cephalosporins and carbapenems.  Susceptibility testing not routinely  performed  *       Significant Imaging: I have reviewed all pertinent imaging results/findings within the past 24 hours.

## 2019-07-13 NOTE — SUBJECTIVE & OBJECTIVE
Past Medical History:   Diagnosis Date    Actinomyces infection 1/17/2017    Right foot    Diabetic ketoacidosis without coma associated with type 2 diabetes mellitus 5/30/2017    Diabetic ulcer of right foot associated with type 2 diabetes mellitus 6/3/2015    Essential hypertension 6/6/2013    Group B streptococcal infection 1/13/2017    Mixed hyperlipidemia 8/12/2014    Shoulder impingement 8/12/2014    Subacute osteomyelitis of right foot 1/12/2017    Streptococcus agalactiae, Actinomyces odontolyticus    Traumatic amputation of fifth toe of right foot 7/2/2015    Type 2 diabetes mellitus with diabetic neuropathy, with long-term current use of insulin 5/3/2016    Ulcerative colitis, unspecified        Past Surgical History:   Procedure Laterality Date    AMPUTATION-TOE Right 6/5/2015    Performed by [facility]    AMPUTATION-TOE and Tendo achilles lengthening Right 1/19/2017    Performed by Ramirez Wilkes D[facility] at [facility] OR    INCISION AND DRAINAGE (I&D), FOOT Right 1/16/2017    Performed by [facility] Wilkes D[facility] at [facility]    RESECTION-METATARSAL HEAD Right 1/16/2017    Performed by [facility]kes [facility] at [facility]    TOE AMPUTATION Right 06/05/2015    TOE AMPUTATION Right 01/19/2017       Review of patient's allergies indicates:  No Known Allergies    Medications:  Medications Prior to Admission   Medication Sig    metFORMIN (GLUCOPHAGE) 500 MG tablet Take 500 mg by mouth 2 (two) times daily with meals.    ONETOUCH DELICA LANCETS 33 gauge Misc     ONETOUCH ULTRA2 kit      Antibiotics (From admission, onward)    Start     Stop Route Frequency Ordered    07/12/19 1900  piperacillin-tazobactam 4.5 g in sodium chloride 0.9% 100 mL IVPB (ready to mix system)      -- IV Every 8 hours (non-standard times) 07/12/19 1351        Antifungals (From admission, onward)    None        Antivirals (From admission, onward)    None           Immunization History   Administered Date(s)  Administered    Pneumococcal Conjugate - 13 Valent 06/09/2017    Pneumococcal Polysaccharide - 23 Valent 08/12/2014    Tdap 06/09/2017       Family History     Problem Relation (Age of Onset)    Cancer Mother    Cataracts Father    Diabetes Mother, Sister    Hypertension Mother, Father, Maternal Aunt    No Known Problems Brother, Sister, Sister, Maternal Uncle, Paternal Aunt, Paternal Uncle, Maternal Grandmother, Maternal Grandfather, Paternal Grandmother, Paternal Grandfather        Social History     Socioeconomic History    Marital status: Single     Spouse name: Not on file    Number of children: 0    Years of education: Not on file    Highest education level: Not on file   Occupational History     Employer: [employer]   Social Needs    Financial resource strain: Not on file    Food insecurity:     Worry: Not on file     Inability: Not on file    Transportation needs:     Medical: Not on file     Non-medical: Not on file   Tobacco Use    Smoking status: Never Smoker    Smokeless tobacco: Never Used   Substance and Sexual Activity    Alcohol use: No    Drug use: No    Sexual activity: Yes     Partners: Female     Birth control/protection: Condom   Lifestyle    Physical activity:     Days per week: Not on file     Minutes per session: Not on file    Stress: Not on file   Relationships    Social connections:     Talks on phone: Not on file     Gets together: Not on file     Attends Oriental orthodox service: Not on file     Active member of club or organization: Not on file     Attends meetings of clubs or organizations: Not on file     Relationship status: Not on file   Other Topics Concern    Not on file   Social History Narrative    Not on file     Review of Systems   Constitutional: Positive for chills, diaphoresis and fever.   Respiratory: Negative for cough and shortness of breath.    Cardiovascular: Negative for chest pain and leg swelling.   Gastrointestinal: Negative for abdominal pain,  constipation, diarrhea, nausea and vomiting.   Genitourinary: Negative for dysuria, frequency and hematuria.   Musculoskeletal: Negative for back pain and myalgias.   Skin: Positive for color change and wound (right foot). Negative for rash.   Neurological: Negative for dizziness, light-headedness and headaches.   Psychiatric/Behavioral: Negative for agitation and behavioral problems. The patient is not nervous/anxious.      Objective:     Vital Signs (Most Recent):  Temp: 99 °F (37.2 °C) (07/13/19 1158)  Pulse: 104 (07/13/19 1158)  Resp: 18 (07/13/19 1158)  BP: (!) 183/94 (07/13/19 1158)  SpO2: 98 % (07/13/19 1158) Vital Signs (24h Range):  Temp:  [98.2 °F (36.8 °C)-101.4 °F (38.6 °C)] 99 °F (37.2 °C)  Pulse:  [] 104  Resp:  [12-29] 18  SpO2:  [91 %-99 %] 98 %  BP: (124-183)/(74-97) 183/94     Weight: 89.3 kg (196 lb 13.9 oz)  Body mass index is 25.97 kg/m².    Estimated Creatinine Clearance: 98.8 mL/min (based on SCr of 1 mg/dL).    Physical Exam   Constitutional: He is oriented to person, place, and time. He appears well-developed and well-nourished. No distress.   Cardiovascular: Normal rate and regular rhythm.   No murmur heard.  Pulmonary/Chest: Effort normal and breath sounds normal. No respiratory distress.   Abdominal: Soft. Bowel sounds are normal. He exhibits no distension.   Musculoskeletal: Normal range of motion. He exhibits no edema.   Dressing in place to right foot.    Neurological: He is alert and oriented to person, place, and time.   Skin: Skin is warm and dry.   Psychiatric: He has a normal mood and affect. His behavior is normal.       Significant Labs:   Blood Culture:   Recent Labs   Lab 07/12/19  1044 07/13/19  0358   LABBLOO Gram stain mary bottle: Gram positive cocci in chains resembling Strep  Results called to and read back by:Farrah Ayala RN 07/12/2019    22:47  Gram stain aer bottle: Gram positive cocci in chains resembling Strep   07/13/2019  06:46  STREPTOCOCCUS  AGALACTIAE (GROUP B)  Beta-hemolytic streptococci are routinely susceptible to   penicillins,cephalosporins and carbapenems.  For susceptibility see order #[id number]  *  Gram stain mary bottle: Gram positive cocci in chains resembling Strep  Results called to and read back by:Farrah Ayala RN 07/12/2019    22:46  Gram stain aer bottle: Gram positive cocci in chains resembling Strep   07/13/2019  06:43  STREPTOCOCCUS AGALACTIAE (GROUP B)  Susceptibility pending  Beta-hemolytic streptococci are routinely susceptible to   penicillins,cephalosporins and carbapenems.  * No Growth to date  No Growth to date     CBC:   Recent Labs   Lab 07/12/19  1023 07/13/19  0358   WBC 14.21* 12.65   HGB 12.0* 11.2*   HCT 37.7* 37.8*   * 329     CMP:   Recent Labs   Lab 07/12/19  2032 07/13/19  0029 07/13/19  0358    143 141   K 3.5 3.4* 4.0    109 105   CO2 25 23 25   * 232* 239*   BUN 9 8 9   CREATININE 1.0 0.9 1.0   CALCIUM 9.0 7.7* 9.0   PROT 7.5 6.0 7.4   ALBUMIN 2.1* 1.7* 2.1*   BILITOT 0.3 0.3 0.3   ALKPHOS 113 94 124   AST 6* 5* 8*   ALT <5* <5* <5*   ANIONGAP 16 11 11   EGFRNONAA >60.0 >60.0 >60.0     Wound Culture:   Recent Labs   Lab 07/12/19  1257   LABAERO STREPTOCOCCUS AGALACTIAE (GROUP B)  Few  Beta-hemolytic streptococci are routinely susceptible to   penicillins,cephalosporins and carbapenems.  Susceptibility testing not routinely performed  *       Significant Imaging: I have reviewed all pertinent imaging results/findings within the past 24 hours.

## 2019-07-13 NOTE — PROGRESS NOTES
Progress Note  Hospital Medicine    Admit Date: 7/12/2019  Length of Stay:  LOS: 1 day     SUBJECTIVE:         Follow-up For:  Acute osteomyelitis    HPI/Interval history (See H&P for complete P,F,SHx) :   Over view  Indio Ryder Jr. is a 49 y.o. male with a PMH of T2DM (poorly  controlled, with long term insulin use) and HTN who presented to the ED on 7/12/2019 diagnosed with  Vomiting (vomiting for past 4 days, denies sick contacts)   Oriented x3 accompanied by daughter at the bedside.  Mentions that he wears lower extremity stockings for edema. Reportedly formed a new ulcer on the left lateral aspect of the foot about 10 days back.  had 4-5 days of nausea and vomiting. Vomiting is non-bloody, No abdominal pain..  It occurs immediately after eating and he has not been able to keep anything down. Associated with fevers and chills but no objective temperature measurement.  At baseline only takes insulin once a day stop taking more than a week because of nausea and vomiting and poor oral intake.  Does not check fingersticks regularly at home. recently lost his insurance and has been unable to follow up with wound care.  His last follow-up with primary care provider and podiatrist was about 2 years.  He has been managing his right foot ulcer at home with dressing but over the past 2 weeks an ulcer on his left foot has developed. He denies any pain or injury- has chronic numbness of bilateral lower extremities and hands from diabetic neuropathy.  Found to be in DKA in the emergency department with beta hydroxybutyrate elevated at 4.1.  Started on insulin drip    Interval history   Temperature of 101.5 last night.  Anion gap resolved- bicarbonate 23, insulin drip discontinued.  Started on Levemir 23 units q.a.m. 7 units as part t.i.d. with meals with low-dose sliding scale.  Diabetic diet and NPO from midnight for possible intervention.  MRI of the foot- Ulceration involving the lateral plantar aspect of the distal  left foot, with findings concerning for exposed bone involving the distal aspect of the 5th metatarsal and proximal phalange of the 5th toe.  There are associated changes concerning for osteomyelitis involving the distal aspect of the 5th metatarsal and entirety of the right toe.  There is osseous hyperemia involving the proximal phalange of the 2nd toe, early changes of osteomyelitis are a consideration . aerobic culture  and blood culture with Group B strep. discontinued vancomycin             Review of Systems: List if applicable  Constitutional: Positive for activity change, appetite change (Poor appetite for the last and), chills, fatigue, fever ( weight loss) and unexpected weight change.   HENT: Negative for congestion, ear discharge, ear pain, facial swelling and sore throat.    Eyes: Negative for pain, discharge and itching.   Respiratory: Positive for shortness of breath ( at rest and exertion ). Negative for cough, chest tightness and wheezing.    Cardiovascular: Positive for leg swelling ( chronic). Negative for chest pain and palpitations.   Gastrointestinal: Positive for vomiting. Negative for abdominal distention, abdominal pain, blood in stool, constipation and diarrhea.   Endocrine: Negative for cold intolerance.   Genitourinary: Negative for dysuria, hematuria and urgency.   Musculoskeletal: Negative for arthralgias, gait problem, myalgias, neck pain and neck stiffness.   Skin: Negative for color change, pallor and rash.   Allergic/Immunologic: Negative for environmental allergies.   Neurological: Positive for weakness and numbness ( bilateral lower extremities and hands attributes to diabetic neuropathy). Negative for dizziness, syncope, light-headedness and headaches.   Hematological: Negative for adenopathy.   Psychiatric/Behavioral: Negative for agitation, behavioral problems and confusion.     OBJECTIVE:     Vital Signs Range (Last 24H):  Temp:  [98.1 °F (36.7 °C)-101.4 °F (38.6 °C)]   Pulse:   []   Resp:  [12-29]   BP: ()/(72-97)   SpO2:  [95 %-100 %]     Physical Exam:  Constitutional: He is oriented to person, place, and time. He appears well-developed and well-nourished.   HENT:   Head: Normocephalic and atraumatic.   Mouth/Throat: Oropharynx is clear and moist. No oropharyngeal exudate.   Eyes: Pupils are equal, round, and reactive to light. Conjunctivae and EOM are normal. Right eye exhibits no discharge. Left eye exhibits no discharge. No scleral icterus.   Neck: Normal range of motion. Neck supple. No JVD present. No tracheal deviation present. No thyromegaly present.   Cardiovascular: Normal rate, regular rhythm and normal heart sounds. Exam reveals no gallop and no friction rub.   No murmur heard.  Pulmonary/Chest: Effort normal and breath sounds normal. No stridor. No respiratory distress. He has no wheezes. He has no rales.   Abdominal: Soft. Bowel sounds are normal. He exhibits no distension and no mass. There is no tenderness. There is no guarding.   Musculoskeletal: Normal range of motion. He exhibits edema.   Lymphadenopathy:     He has no cervical adenopathy.   Neurological: He is oriented to person, place, and time. No cranial nerve deficit.   Able to move upper and lower extremities without limitation   Skin: Skin is warm and dry.   Left foot: 6cm ulcer with exposed subcutaneous fat and eschar with foul smelling, purulent drainage overlying lateral aspect of left 5th MTP.     Right foot: 1.5cm clean based ulcer at base of right 5th MTP joint. Right great toe and second toe surgically amputated.     Psychiatric: He has a normal mood and affect. His behavior is normal.   Nursing note and vitals reviewed    Medications:  Medication list was reviewed and changes noted under Assessment/Plan.      Current Facility-Administered Medications:     0.9%  NaCl infusion, , Intravenous, Continuous, Vaibhav Rodriguez MD, Last Rate: 150 mL/hr at 07/12/19 2335    acetaminophen tablet 650 mg,  650 mg, Oral, Q6H PRN, Vicente Wick MD, 650 mg at 07/12/19 1913    atorvastatin tablet 80 mg, 80 mg, Oral, Daily, Vicente Wick MD, 80 mg at 07/12/19 1402    dextrose 10% (D10W) Bolus, 12.5 g, Intravenous, PRN, Vicente Wick MD    dextrose 10% (D10W) Bolus, 25 g, Intravenous, PRN, Vicente Wick MD    glucagon (human recombinant) injection 1 mg, 1 mg, Intramuscular, PRN, Vicente Wick MD    glucose chewable tablet 16 g, 16 g, Oral, PRN, Vicente Wick MD    glucose chewable tablet 24 g, 24 g, Oral, PRN, Vicente Wick MD    insulin regular (Humulin R) 100 Units in sodium chloride 0.9% 100 mL infusion, 4 Units/hr, Intravenous, Continuous, Trung Fall MD, Last Rate: 0.7 mL/hr at 07/13/19 0443, 0.7 Units/hr at 07/13/19 0443    ondansetron injection 4 mg, 4 mg, Intravenous, Q8H PRN, Vicente Wick MD, 4 mg at 07/12/19 1509    piperacillin-tazobactam 4.5 g in sodium chloride 0.9% 100 mL IVPB (ready to mix system), 4.5 g, Intravenous, Q8H, Trung Fall MD, Last Rate: 25 mL/hr at 07/12/19 2159, 4.5 g at 07/12/19 2159    promethazine (PHENERGAN) 12.5 mg in dextrose 5 % 50 mL IVPB, 12.5 mg, Intravenous, Q6H PRN, Jaden Weathers PA-C, Last Rate: 150 mL/hr at 07/12/19 2053, 12.5 mg at 07/12/19 2053    sodium chloride 0.9% flush 10 mL, 10 mL, Intravenous, PRN, Vicente Wick MD    vancomycin 1.5 g in dextrose 5 % 250 mL IVPB (ready to mix), 15 mg/kg, Intravenous, Q12H, Trung Fall MD, Last Rate: 166.7 mL/hr at 07/13/19 0341, 1,500 mg at 07/13/19 0341    acetaminophen, Dextrose 10% Bolus, Dextrose 10% Bolus, glucagon (human recombinant), glucose, glucose, ondansetron, promethazine (PHENERGAN) IVPB, sodium chloride 0.9%    Laboratory/Diagnostic Data:  Reviewed and noted in plan where applicable- Please see chart for full lab data.    Recent Labs   Lab 07/12/19  1023 07/13/19  0358   WBC 14.21* 12.65   HGB 12.0* 11.2*   HCT 37.7* 37.8*   * 329       Recent  Labs   Lab 07/12/19  1037 07/12/19  1454 07/12/19 2032 07/13/19  0029   NA  --  142 144 143   K  --  3.7 3.5 3.4*   CL  --  105 103 109   CO2  --  23 25 23   BUN  --  11 9 8   CREATININE  --  1.0 1.0 0.9   GLU  --  232* 230* 232*   CALCIUM  --  9.4 9.0 7.7*   MG 1.7  --   --   --    PHOS  --  1.5*  --   --    LIPASE <3*  --   --   --        Recent Labs   Lab 07/12/19  1023 07/12/19  1454 07/12/19 2032 07/13/19  0029   ALKPHOS 148*  --  113 94   ALT 5*  --  <5* <5*   AST 7*  --  6* 5*   ALBUMIN 2.5* 2.1* 2.1* 1.7*   PROT 8.5*  --  7.5 6.0   BILITOT 0.4  --  0.3 0.3        Microbiology labs for the last week  Microbiology Results (last 7 days)     Procedure Component Value Units Date/Time    Blood culture [[id number]] Collected:  07/13/19 0358    Order Status:  Sent Specimen:  Blood Updated:  07/13/19 0444    Blood culture [[id number]] Collected:  07/13/19 0358    Order Status:  Sent Specimen:  Blood Updated:  07/13/19 0444    Blood culture x two cultures. Draw prior to antibiotics. [[id number]] Collected:  07/12/19 1044    Order Status:  Completed Specimen:  Blood from Peripheral, Antecubital, Right Updated:  07/12/19 2247     Blood Culture, Routine Gram stain mary bottle: Gram positive cocci in chains resembling Strep      Results called to and read back by:Farrah Ayala RN 07/12/2019        22:47    Narrative:       Aerobic and anaerobic    Blood culture x two cultures. Draw prior to antibiotics. [[id number]] Collected:  07/12/19 1044    Order Status:  Completed Specimen:  Blood from Peripheral, Antecubital, Left Updated:  07/12/19 2246     Blood Culture, Routine Gram stain mary bottle: Gram positive cocci in chains resembling Strep      Results called to and read back by:Farrah Ayala RN 07/12/2019        22:46    Narrative:       Aerobic and anaerobic    Aerobic culture [[id number]] Collected:  07/12/19 1257    Order Status:  Sent Specimen:  Wound from Foot, Left Updated:  07/12/19 1311    Culture, Anaerobe  [[id number]] Collected:  07/12/19 1257    Order Status:  Sent Specimen:  Wound from Foot, Left Updated:  07/12/19 1311           Imaging Results          MRI Foot (Forefoot) Left Without Contrast (Final result)  Result time 07/12/19 19:07:38    Final result by Bob Oglesby MD (07/12/19 19:07:38)             Impression:      Ulceration involving the lateral plantar aspect of the distal left foot, with findings concerning for exposed bone involving the distal aspect of the 5th metatarsal and proximal phalange of the 5th toe.  There are associated changes concerning for osteomyelitis involving the distal aspect of the 5th metatarsal and entirety of the right toe.  There is osseous hyperemia involving the proximal phalange of the 2nd toe, early changes of osteomyelitis are a consideration no definite low signal on T1 at this time.      Electronically signed by: Bob Oglesby MD  Date:    07/12/2019  Time:    19:07           Narrative:    EXAMINATION:  MRI FOOT (FOREFOOT) LEFT WITHOUT CONTRAST    CLINICAL HISTORY:  Open wound of ankle, foot and toes;    TECHNIQUE:  Multiplanar multisequence MRI images of the left forefoot were obtained without administration of IV contrast.    COMPARISON:  Radiograph 07/12/2019    FINDINGS:  There is soft tissue ulceration involving the lateral plantar aspect of the right foot, noting there appears to be exposed bone in the location, likely involving the distal aspect of the 5th metatarsal head, and proximal phalange of the 5th toe. There is diffusely increased STIR signal within the distal aspect of the 5th metatarsal, and throughout the phalanges of the 5th toe. There is corresponding low T1 signal involving the same regions, concerning for osteomyelitis. There is additional abnormal STIR signal within the base of the proximal phalange of the 2nd toe, without convincing low signal on T1, suggesting osseous hyperemia although early changes of osteomyelitis remain a consideration.  There is edema and induration about the open wound, no convincing focal organized fluid collection.  The remainder of the osseous structures are grossly unremarkable. No significant joint effusions.  There is reactive edema about the intrinsic musculature of the foot, as well as along the dorsal surface.                              X-Ray Foot Complete Left (Final result)  Result time 07/12/19 11:06:16    Final result by Chandan Montana MD (07/12/19 11:06:16)             Impression:      Abnormal examination with findings strongly suggesting osteomyelitis involving the base of the proximal phalanx of the left 5th toe and likely the head of the 5th metatarsal as well.    This report was flagged in Epic as abnormal.      Electronically signed by: Chandan Montana MD  Date:    07/12/2019  Time:    11:06           Narrative:    EXAMINATION:  XR FOOT COMPLETE 3 VIEW LEFT    TECHNIQUE:  Three views of the left foot were obtained, with AP, lateral, and oblique projections submitted.    COMPARISON:  No relevant comparison examinations are currently available.    FINDINGS:  Focal soft tissue loss/irregularity involving the region of the left 5th MTP joint is observed, with gas present within the soft tissues at this level, the findings likely related to a clinically evident ulceration.  There is focal lytic bone destruction involving the base of the proximal phalanx of the 5th toe, and possibly involving the head of the 5th metatarsal as well, this radiographic appearance strongly suggesting osteomyelitis.  The remaining osseous structures appear intact, with no evidence of recent fracture and no additional areas of focal lytic bone destruction noted.  Some arterial vascular calcification in the soft tissues about the ankle is incidentally observed.                             X-Ray Chest AP Portable (Final result)  Result time 07/12/19 10:48:19    Final result by Chandan Montana MD (07/12/19 10:48:19)             Impression:      No  "significant intrathoracic abnormality.  Allowing for differences in projection and a poorer inspiratory depth level on the current examination, there has been no significant detrimental interval change in the appearance of the chest since 05/03/2016.      Electronically signed by: Chandan Montana MD  Date:    07/12/2019  Time:    10:48           Narrative:    EXAMINATION:  XR CHEST AP PORTABLE    CLINICAL HISTORY:  hyperglycemia;    COMPARISON:  Comparison is made to the most recent prior chest radiograph, dated 05/03/2016.    FINDINGS:  Heart size is normal, as is the appearance of the pulmonary vascularity.  Lung zones are clear, and free of significant airspace consolidation or volume loss.  No pleural fluid.  No abnormal mediastinal widening.  No pneumothorax.  Incidental note is made of some spurring at the right acromioclavicular joint level.                              Estimated body mass index is 25.71 kg/m² as calculated from the following:    Height as of this encounter: 6' 1" (1.854 m).    Weight as of this encounter: 88.4 kg (194 lb 14.2 oz).    I & O (Last 24H):    Intake/Output Summary (Last 24 hours) at 7/13/2019 0528  Last data filed at 7/12/2019 2336  Gross per 24 hour   Intake 4964.74 ml   Output 500 ml   Net 4464.74 ml       Estimated Creatinine Clearance: 109.7 mL/min (based on SCr of 0.9 mg/dL).    ASSESSMENT/PLAN:     Active Problems:    Active Diagnoses:     Diagnosis Date Noted POA    PRINCIPAL PROBLEM:  Acute osteomyelitis [M86.10] x-ray left foot - strongly suggesting osteomyelitis involving the base of the proximal phalanx of the left 5th toe and likely the head of the 5th metatarsal as well.  Obtain MRI the foot without contrast.  Wound cultures pending      recently lost his insurance and has been unable to follow up with wound care.  His last follow-up with primary care provider and podiatrist was about 2 years.  He has been managing his right foot ulcer at home with dressing but over the " past 2 weeks an ulcer on his left foot has developed. He denies any pain or injury- has chronic numbness of bilateral lower extremities and hands from diabetic neuropathy.  Started on IV Zosyn and vanc.  Podiatry and Infectious Disease consult.  aerobic culture with Group B strep. discontinued vancomycin   07/12/2019 Yes    Sepsis [A41.9] /bacteremia. secondary to diabetic foot ulcer.  Elevated ESR greater than 120 As above 07/12/2019 Yes    Leukocytosis [D72.829] 14 secondary to sepsis. resolved  07/12/2019 Unknown    Anemia [D64.9] hemoglobin at 12, normocytic.  Monitor 07/12/2019 Unknown    Type 2 diabetes mellitus with left eye affected by mild nonproliferative retinopathy without macular edema, without long-term current use of insulin [E11.3292]At baseline only takes insulin once a day stopped  taking more than a week because of nausea and vomiting and poor oral intake.  Does not check fingersticks regularly at home. recently lost his insurance.  Obtain HbA1c elevated greater than 14.  Continue insulin drip.  Endocrine evaluation 08/11/2017 Yes       Chronic    Diabetic ketoacidosis without coma associated with type 2 diabetes mellitus [E11.10]  Found to be in DKA in the emergency department with beta hydroxybutyrate elevated at 4.1.  Started on insulin drip.  Received normal saline in the emergency department    07/13/2019  Anion gap resolved- bicarbonate 23, insulin drip discontinued.  Started on Levemir 23 units q.a.m. 7 units as part t.i.d. with meals with low-dose sliding scale.  Diabetic diet and NPO from midnight for possible intervention    Hypokalemia- 3.4 replaced   05/30/2017 Unknown    Type 2 diabetes mellitus with diabetic neuropathy, with long-term current use of insulin [E11.40, Z79.4] 05/03/2016 Not Applicable       Chronic    Mixed hyperlipidemia [E78.2] continue with Lipitor 08/12/2014 Yes    Essential hypertension [I10] blood pressure controlled without medications.  Monitor   06/06/2013 Yes       Chronic               Disposition- home    DVT prophylaxis addressed with:  Brian Wick MD  Attending Staff Physician  [facility]  pager- [phone] Idxzvtgnsta - [phone]

## 2019-07-13 NOTE — CONSULTS
[facility]  Infectious Disease  Consult Note      Inpatient consult to Infectious Diseases  Consult performed by: RAMILA Kinsey  Consult ordered by: Martin Magallon MD        Assessment/Plan:     Bacteremia due to group B Streptococcus  Blood cultures positive for GBS. Wound cultures also with GBS. Per podiatry note, there is necrosis, purulence tracking down to bone. Plan for tentative I&D tomorrow in OR with podiatry    Plan:  1. Continue empiric zosyn for now (to cover GBS as well as other organisms as wound likely polymicrobial); d/c vanc  2. F/u on repeat blood cultures  3. Need source control with I&D of foot  4. Will de-escalate once final cultures are back  5. Anticipate long term antibiotic therapy    Full note to follow.  Thanks, RAMILA Carty-C  Pager: [phone]

## 2019-07-13 NOTE — ASSESSMENT & PLAN NOTE
Admitted in DKA.  Blood cultures positive for GBS. Wound cultures also with GBS. Per podiatry note, there is necrosis, purulence tracking down to bone. Plan for tentative I&D tomorrow in OR with podiatry  - febrile to 101.4; leukocytosis of 14K  - MRI shows osteo of the 5th metatarsal and 5th toe    Plan:  1. Continue zosyn for now (for GBS as well as other organisms as wound likely polymicrobial)  2. D/c vancomycin given no MRSA growth; would re-start if MRSA is isolated from cultures  3. Repeat blood cultures until cleared  4. Recommend I&D/ possible amputation with podiatry for source control  5. Will de-escalate antibiotics once culture results are final  6. Anticipate outpatient parenteral therapy; hold off on PICC placement until blood cultures clear  7. DKA managed by primary team

## 2019-07-13 NOTE — ASSESSMENT & PLAN NOTE
Blood cultures positive for GBS. Wound cultures also with GBS. Per podiatry note, there is necrosis, purulence tracking down to bone. Plan for tentative I&D tomorrow in OR with podiatry    Plan:  1. Continue empiric vanc/zosyn for now (to cover GBS as well as other organisms as wound likely polymicrobial)  2. F/u on repeat blood cultures  3. Need source control with I&D of foot  4. Will de-escalate once final cultures are back  5. Anticipate long term antibiotic therapy    Full note to follow.  Thanks, Tatyana Knutson PA-C  Pager: [phone]

## 2019-07-13 NOTE — PLAN OF CARE
Problem: Adult Inpatient Plan of Care  Goal: Plan of Care Review  Outcome: Ongoing (interventions implemented as appropriate)  Pt AAx4, breathing even and unlabored, call light in reach, bed in lowest position. Mother at bedside throughout shift. Pt reported intermittent nausea and vomitting throughout shift. Administered PRN medications per orders. Wound care completed per orders. Pt tolerated well. Pt elevated BP, reported to med team. Refer to orders. Bladder scanned pt, revealed <800. Orders placed for PRN bladder scan and straight cath. Pt able to void independently, no straight cath necessary at this time. Otherwise, VSS stable. Frequent hourly rounding completed. Will continue to monitor.

## 2019-07-14 ENCOUNTER — ANESTHESIA (OUTPATIENT)
Dept: SURGERY | Facility: HOSPITAL | Age: 49
DRG: 853 | End: 2019-07-14
Payer: MEDICAID

## 2019-07-14 LAB
ABO + RH BLD: NORMAL
ALBUMIN SERPL BCP-MCNC: 1.8 G/DL (ref 3.5–5.2)
ALBUMIN SERPL BCP-MCNC: 1.9 G/DL (ref 3.5–5.2)
ALBUMIN SERPL BCP-MCNC: 2 G/DL (ref 3.5–5.2)
ALP SERPL-CCNC: 108 U/L (ref 55–135)
ALP SERPL-CCNC: 112 U/L (ref 55–135)
ALP SERPL-CCNC: 118 U/L (ref 55–135)
ALT SERPL W/O P-5'-P-CCNC: 5 U/L (ref 10–44)
ALT SERPL W/O P-5'-P-CCNC: <5 U/L (ref 10–44)
ALT SERPL W/O P-5'-P-CCNC: <5 U/L (ref 10–44)
ANION GAP SERPL CALC-SCNC: 10 MMOL/L (ref 8–16)
ANION GAP SERPL CALC-SCNC: 12 MMOL/L (ref 8–16)
ANION GAP SERPL CALC-SCNC: 13 MMOL/L (ref 8–16)
AST SERPL-CCNC: 10 U/L (ref 10–40)
AST SERPL-CCNC: 8 U/L (ref 10–40)
AST SERPL-CCNC: 9 U/L (ref 10–40)
BACTERIA BLD CULT: ABNORMAL
BASOPHILS # BLD AUTO: 0.03 K/UL (ref 0–0.2)
BASOPHILS NFR BLD: 0.2 % (ref 0–1.9)
BILIRUB SERPL-MCNC: 0.2 MG/DL (ref 0.1–1)
BILIRUB SERPL-MCNC: 0.3 MG/DL (ref 0.1–1)
BILIRUB SERPL-MCNC: 0.3 MG/DL (ref 0.1–1)
BLD GP AB SCN CELLS X3 SERPL QL: NORMAL
BUN SERPL-MCNC: 5 MG/DL (ref 6–20)
CALCIUM SERPL-MCNC: 8.6 MG/DL (ref 8.7–10.5)
CALCIUM SERPL-MCNC: 9 MG/DL (ref 8.7–10.5)
CALCIUM SERPL-MCNC: 9.1 MG/DL (ref 8.7–10.5)
CHLORIDE SERPL-SCNC: 102 MMOL/L (ref 95–110)
CHLORIDE SERPL-SCNC: 104 MMOL/L (ref 95–110)
CHLORIDE SERPL-SCNC: 99 MMOL/L (ref 95–110)
CO2 SERPL-SCNC: 27 MMOL/L (ref 23–29)
CREAT SERPL-MCNC: 0.7 MG/DL (ref 0.5–1.4)
CREAT SERPL-MCNC: 0.8 MG/DL (ref 0.5–1.4)
CREAT SERPL-MCNC: 0.8 MG/DL (ref 0.5–1.4)
DIFFERENTIAL METHOD: ABNORMAL
EOSINOPHIL # BLD AUTO: 0 K/UL (ref 0–0.5)
EOSINOPHIL NFR BLD: 0.1 % (ref 0–8)
ERYTHROCYTE [DISTWIDTH] IN BLOOD BY AUTOMATED COUNT: 12.8 % (ref 11.5–14.5)
EST. GFR  (AFRICAN AMERICAN): >60 ML/MIN/1.73 M^2
EST. GFR  (NON AFRICAN AMERICAN): >60 ML/MIN/1.73 M^2
GLUCOSE SERPL-MCNC: 113 MG/DL (ref 70–110)
GLUCOSE SERPL-MCNC: 134 MG/DL (ref 70–110)
GLUCOSE SERPL-MCNC: 155 MG/DL (ref 70–110)
HCT VFR BLD AUTO: 35.4 % (ref 40–54)
HGB BLD-MCNC: 10.9 G/DL (ref 14–18)
IMM GRANULOCYTES # BLD AUTO: 0.04 K/UL (ref 0–0.04)
IMM GRANULOCYTES NFR BLD AUTO: 0.3 % (ref 0–0.5)
LYMPHOCYTES # BLD AUTO: 1.8 K/UL (ref 1–4.8)
LYMPHOCYTES NFR BLD: 12.8 % (ref 18–48)
MAGNESIUM SERPL-MCNC: 1.7 MG/DL (ref 1.6–2.6)
MCH RBC QN AUTO: 28.3 PG (ref 27–31)
MCHC RBC AUTO-ENTMCNC: 30.8 G/DL (ref 32–36)
MCV RBC AUTO: 92 FL (ref 82–98)
MONOCYTES # BLD AUTO: 1.2 K/UL (ref 0.3–1)
MONOCYTES NFR BLD: 8.5 % (ref 4–15)
NEUTROPHILS # BLD AUTO: 10.8 K/UL (ref 1.8–7.7)
NEUTROPHILS NFR BLD: 78.1 % (ref 38–73)
NRBC BLD-RTO: 0 /100 WBC
PHOSPHATE SERPL-MCNC: 2.5 MG/DL (ref 2.7–4.5)
PLATELET # BLD AUTO: 340 K/UL (ref 150–350)
PMV BLD AUTO: 10.4 FL (ref 9.2–12.9)
POCT GLUCOSE: 113 MG/DL (ref 70–110)
POCT GLUCOSE: 151 MG/DL (ref 70–110)
POCT GLUCOSE: 152 MG/DL (ref 70–110)
POCT GLUCOSE: 153 MG/DL (ref 70–110)
POCT GLUCOSE: 163 MG/DL (ref 70–110)
POCT GLUCOSE: 167 MG/DL (ref 70–110)
POCT GLUCOSE: 171 MG/DL (ref 70–110)
POTASSIUM SERPL-SCNC: 3.2 MMOL/L (ref 3.5–5.1)
POTASSIUM SERPL-SCNC: 3.3 MMOL/L (ref 3.5–5.1)
POTASSIUM SERPL-SCNC: 3.6 MMOL/L (ref 3.5–5.1)
PROT SERPL-MCNC: 6.8 G/DL (ref 6–8.4)
PROT SERPL-MCNC: 6.9 G/DL (ref 6–8.4)
PROT SERPL-MCNC: 7.5 G/DL (ref 6–8.4)
RBC # BLD AUTO: 3.85 M/UL (ref 4.6–6.2)
SODIUM SERPL-SCNC: 139 MMOL/L (ref 136–145)
SODIUM SERPL-SCNC: 139 MMOL/L (ref 136–145)
SODIUM SERPL-SCNC: 143 MMOL/L (ref 136–145)
WBC # BLD AUTO: 13.81 K/UL (ref 3.9–12.7)

## 2019-07-14 PROCEDURE — 83735 ASSAY OF MAGNESIUM: CPT

## 2019-07-14 PROCEDURE — 37000009 HC ANESTHESIA EA ADD 15 MINS: Performed by: PODIATRIST

## 2019-07-14 PROCEDURE — 99232 PR SUBSEQUENT HOSPITAL CARE,LEVL II: ICD-10-PCS | Mod: ,,, | Performed by: INTERNAL MEDICINE

## 2019-07-14 PROCEDURE — 87076 CULTURE ANAEROBE IDENT EACH: CPT

## 2019-07-14 PROCEDURE — 63600175 PHARM REV CODE 636 W HCPCS: Performed by: HOSPITALIST

## 2019-07-14 PROCEDURE — 87040 BLOOD CULTURE FOR BACTERIA: CPT

## 2019-07-14 PROCEDURE — 87075 CULTR BACTERIA EXCEPT BLOOD: CPT

## 2019-07-14 PROCEDURE — 25000003 PHARM REV CODE 250: Performed by: PODIATRIST

## 2019-07-14 PROCEDURE — 88311 DECALCIFY TISSUE: CPT | Mod: 26,,, | Performed by: PATHOLOGY

## 2019-07-14 PROCEDURE — D9220A PRA ANESTHESIA: ICD-10-PCS | Mod: ANES,,, | Performed by: ANESTHESIOLOGY

## 2019-07-14 PROCEDURE — 36000707: Performed by: PODIATRIST

## 2019-07-14 PROCEDURE — 88305 TISSUE SPECIMEN TO PATHOLOGY - SURGERY: ICD-10-PCS | Mod: 26,,, | Performed by: PATHOLOGY

## 2019-07-14 PROCEDURE — 11042 DBRDMT SUBQ TIS 1ST 20SQCM/<: CPT | Mod: 59,,, | Performed by: PODIATRIST

## 2019-07-14 PROCEDURE — 20600001 HC STEP DOWN PRIVATE ROOM

## 2019-07-14 PROCEDURE — 87147 CULTURE TYPE IMMUNOLOGIC: CPT

## 2019-07-14 PROCEDURE — 80053 COMPREHEN METABOLIC PANEL: CPT

## 2019-07-14 PROCEDURE — 82962 GLUCOSE BLOOD TEST: CPT | Performed by: PODIATRIST

## 2019-07-14 PROCEDURE — 11042 PR DEBRIDEMENT, SKIN, SUB-Q TISSUE,=<20 SQ CM: ICD-10-PCS | Mod: 59,,, | Performed by: PODIATRIST

## 2019-07-14 PROCEDURE — 37000008 HC ANESTHESIA 1ST 15 MINUTES: Performed by: PODIATRIST

## 2019-07-14 PROCEDURE — D9220A PRA ANESTHESIA: Mod: ANES,,, | Performed by: ANESTHESIOLOGY

## 2019-07-14 PROCEDURE — D9220A PRA ANESTHESIA: ICD-10-PCS | Mod: CRNA,,, | Performed by: NURSE ANESTHETIST, CERTIFIED REGISTERED

## 2019-07-14 PROCEDURE — 63600175 PHARM REV CODE 636 W HCPCS: Performed by: STUDENT IN AN ORGANIZED HEALTH CARE EDUCATION/TRAINING PROGRAM

## 2019-07-14 PROCEDURE — 85025 COMPLETE CBC W/AUTO DIFF WBC: CPT

## 2019-07-14 PROCEDURE — 80053 COMPREHEN METABOLIC PANEL: CPT | Mod: 91

## 2019-07-14 PROCEDURE — 88305 TISSUE EXAM BY PATHOLOGIST: CPT | Performed by: PATHOLOGY

## 2019-07-14 PROCEDURE — 99233 PR SUBSEQUENT HOSPITAL CARE,LEVL III: ICD-10-PCS | Mod: ,,, | Performed by: HOSPITALIST

## 2019-07-14 PROCEDURE — 36000706: Performed by: PODIATRIST

## 2019-07-14 PROCEDURE — 88311 TISSUE SPECIMEN TO PATHOLOGY - SURGERY: ICD-10-PCS | Mod: 26,,, | Performed by: PATHOLOGY

## 2019-07-14 PROCEDURE — 71000033 HC RECOVERY, INTIAL HOUR: Performed by: PODIATRIST

## 2019-07-14 PROCEDURE — 25000003 PHARM REV CODE 250: Performed by: PHYSICIAN ASSISTANT

## 2019-07-14 PROCEDURE — 36415 COLL VENOUS BLD VENIPUNCTURE: CPT

## 2019-07-14 PROCEDURE — 63600175 PHARM REV CODE 636 W HCPCS: Performed by: NURSE ANESTHETIST, CERTIFIED REGISTERED

## 2019-07-14 PROCEDURE — 99232 SBSQ HOSP IP/OBS MODERATE 35: CPT | Mod: ,,, | Performed by: INTERNAL MEDICINE

## 2019-07-14 PROCEDURE — 87070 CULTURE OTHR SPECIMN AEROBIC: CPT

## 2019-07-14 PROCEDURE — 25000003 PHARM REV CODE 250: Performed by: HOSPITALIST

## 2019-07-14 PROCEDURE — 28005 TREAT FOOT BONE LESION: CPT | Mod: LT,,, | Performed by: PODIATRIST

## 2019-07-14 PROCEDURE — D9220A PRA ANESTHESIA: Mod: CRNA,,, | Performed by: NURSE ANESTHETIST, CERTIFIED REGISTERED

## 2019-07-14 PROCEDURE — 63600175 PHARM REV CODE 636 W HCPCS: Performed by: PHYSICIAN ASSISTANT

## 2019-07-14 PROCEDURE — 71000039 HC RECOVERY, EACH ADD'L HOUR: Performed by: PODIATRIST

## 2019-07-14 PROCEDURE — 25000003 PHARM REV CODE 250: Performed by: STUDENT IN AN ORGANIZED HEALTH CARE EDUCATION/TRAINING PROGRAM

## 2019-07-14 PROCEDURE — 99233 SBSQ HOSP IP/OBS HIGH 50: CPT | Mod: ,,, | Performed by: HOSPITALIST

## 2019-07-14 PROCEDURE — 28005 PR DEEP INCIS FOOT BONE INFECTN: ICD-10-PCS | Mod: LT,,, | Performed by: PODIATRIST

## 2019-07-14 PROCEDURE — S0020 INJECTION, BUPIVICAINE HYDRO: HCPCS | Performed by: PODIATRIST

## 2019-07-14 PROCEDURE — 88305 TISSUE EXAM BY PATHOLOGIST: CPT | Mod: 26,,, | Performed by: PATHOLOGY

## 2019-07-14 PROCEDURE — 84100 ASSAY OF PHOSPHORUS: CPT

## 2019-07-14 PROCEDURE — 86901 BLOOD TYPING SEROLOGIC RH(D): CPT

## 2019-07-14 PROCEDURE — 25000003 PHARM REV CODE 250: Performed by: NURSE ANESTHETIST, CERTIFIED REGISTERED

## 2019-07-14 PROCEDURE — 63600175 PHARM REV CODE 636 W HCPCS: Performed by: EMERGENCY MEDICINE

## 2019-07-14 PROCEDURE — 27201423 OPTIME MED/SURG SUP & DEVICES STERILE SUPPLY: Performed by: PODIATRIST

## 2019-07-14 RX ORDER — MEPERIDINE HYDROCHLORIDE 50 MG/ML
12.5 INJECTION INTRAMUSCULAR; INTRAVENOUS; SUBCUTANEOUS ONCE AS NEEDED
Status: DISCONTINUED | OUTPATIENT
Start: 2019-07-14 | End: 2019-07-15

## 2019-07-14 RX ORDER — SODIUM CHLORIDE 0.9 % (FLUSH) 0.9 %
10 SYRINGE (ML) INJECTION
Status: DISCONTINUED | OUTPATIENT
Start: 2019-07-14 | End: 2019-07-15

## 2019-07-14 RX ORDER — VANCOMYCIN 1.75 GRAM/500 ML IN 0.9 % SODIUM CHLORIDE INTRAVENOUS
20 ONCE
Status: COMPLETED | OUTPATIENT
Start: 2019-07-14 | End: 2019-07-14

## 2019-07-14 RX ORDER — POTASSIUM CHLORIDE 7.45 MG/ML
10 INJECTION INTRAVENOUS
Status: COMPLETED | OUTPATIENT
Start: 2019-07-14 | End: 2019-07-14

## 2019-07-14 RX ORDER — LIDOCAINE HCL/PF 100 MG/5ML
SYRINGE (ML) INTRAVENOUS
Status: DISCONTINUED | OUTPATIENT
Start: 2019-07-14 | End: 2019-07-14

## 2019-07-14 RX ORDER — PHENYLEPHRINE HYDROCHLORIDE 10 MG/ML
INJECTION INTRAVENOUS
Status: DISCONTINUED | OUTPATIENT
Start: 2019-07-14 | End: 2019-07-14

## 2019-07-14 RX ORDER — BUPIVACAINE HYDROCHLORIDE 5 MG/ML
INJECTION, SOLUTION EPIDURAL; INTRACAUDAL
Status: DISCONTINUED | OUTPATIENT
Start: 2019-07-14 | End: 2019-07-14 | Stop reason: HOSPADM

## 2019-07-14 RX ORDER — FENTANYL CITRATE 50 UG/ML
INJECTION, SOLUTION INTRAMUSCULAR; INTRAVENOUS
Status: DISCONTINUED | OUTPATIENT
Start: 2019-07-14 | End: 2019-07-14

## 2019-07-14 RX ORDER — PROPOFOL 10 MG/ML
VIAL (ML) INTRAVENOUS CONTINUOUS PRN
Status: DISCONTINUED | OUTPATIENT
Start: 2019-07-14 | End: 2019-07-14

## 2019-07-14 RX ORDER — ONDANSETRON 2 MG/ML
4 INJECTION INTRAMUSCULAR; INTRAVENOUS DAILY PRN
Status: DISCONTINUED | OUTPATIENT
Start: 2019-07-14 | End: 2019-07-15

## 2019-07-14 RX ORDER — MIDAZOLAM HYDROCHLORIDE 1 MG/ML
INJECTION, SOLUTION INTRAMUSCULAR; INTRAVENOUS
Status: DISCONTINUED | OUTPATIENT
Start: 2019-07-14 | End: 2019-07-14

## 2019-07-14 RX ORDER — KETAMINE HYDROCHLORIDE 10 MG/ML
INJECTION, SOLUTION INTRAMUSCULAR; INTRAVENOUS
Status: DISCONTINUED | OUTPATIENT
Start: 2019-07-14 | End: 2019-07-14

## 2019-07-14 RX ORDER — LIDOCAINE HYDROCHLORIDE 10 MG/ML
INJECTION, SOLUTION EPIDURAL; INFILTRATION; INTRACAUDAL; PERINEURAL
Status: DISCONTINUED | OUTPATIENT
Start: 2019-07-14 | End: 2019-07-14 | Stop reason: HOSPADM

## 2019-07-14 RX ORDER — SODIUM CHLORIDE 0.9 % (FLUSH) 0.9 %
3 SYRINGE (ML) INJECTION
Status: DISCONTINUED | OUTPATIENT
Start: 2019-07-14 | End: 2019-07-17 | Stop reason: HOSPADM

## 2019-07-14 RX ORDER — HYDROMORPHONE HYDROCHLORIDE 1 MG/ML
0.2 INJECTION, SOLUTION INTRAMUSCULAR; INTRAVENOUS; SUBCUTANEOUS EVERY 5 MIN PRN
Status: DISCONTINUED | OUTPATIENT
Start: 2019-07-14 | End: 2019-07-15

## 2019-07-14 RX ORDER — PROPOFOL 10 MG/ML
VIAL (ML) INTRAVENOUS
Status: DISCONTINUED | OUTPATIENT
Start: 2019-07-14 | End: 2019-07-14

## 2019-07-14 RX ADMIN — MIDAZOLAM HYDROCHLORIDE 1 MG: 1 INJECTION, SOLUTION INTRAMUSCULAR; INTRAVENOUS at 04:07

## 2019-07-14 RX ADMIN — POTASSIUM CHLORIDE 10 MEQ: 10 INJECTION, SOLUTION INTRAVENOUS at 09:07

## 2019-07-14 RX ADMIN — POTASSIUM CHLORIDE 10 MEQ: 10 INJECTION, SOLUTION INTRAVENOUS at 08:07

## 2019-07-14 RX ADMIN — POTASSIUM CHLORIDE 10 MEQ: 10 INJECTION, SOLUTION INTRAVENOUS at 10:07

## 2019-07-14 RX ADMIN — CALCIUM CARBONATE (ANTACID) CHEW TAB 500 MG 1000 MG: 500 CHEW TAB at 06:07

## 2019-07-14 RX ADMIN — HYDRALAZINE HYDROCHLORIDE 25 MG: 25 TABLET, FILM COATED ORAL at 07:07

## 2019-07-14 RX ADMIN — AMLODIPINE BESYLATE 5 MG: 5 TABLET ORAL at 08:07

## 2019-07-14 RX ADMIN — PROPOFOL 20 MG: 10 INJECTION, EMULSION INTRAVENOUS at 04:07

## 2019-07-14 RX ADMIN — SODIUM CHLORIDE, SODIUM GLUCONATE, SODIUM ACETATE, POTASSIUM CHLORIDE, MAGNESIUM CHLORIDE, SODIUM PHOSPHATE, DIBASIC, AND POTASSIUM PHOSPHATE: .53; .5; .37; .037; .03; .012; .00082 INJECTION, SOLUTION INTRAVENOUS at 04:07

## 2019-07-14 RX ADMIN — PIPERACILLIN AND TAZOBACTAM 4.5 G: 4; .5 INJECTION, POWDER, LYOPHILIZED, FOR SOLUTION INTRAVENOUS; PARENTERAL at 06:07

## 2019-07-14 RX ADMIN — LIDOCAINE HYDROCHLORIDE 100 MG: 20 INJECTION, SOLUTION INTRAVENOUS at 04:07

## 2019-07-14 RX ADMIN — SODIUM PHOSPHATE, MONOBASIC, MONOHYDRATE 15 MMOL: 276; 142 INJECTION, SOLUTION INTRAVENOUS at 02:07

## 2019-07-14 RX ADMIN — POTASSIUM CHLORIDE 10 MEQ: 10 INJECTION, SOLUTION INTRAVENOUS at 11:07

## 2019-07-14 RX ADMIN — HYDRALAZINE HYDROCHLORIDE 25 MG: 25 TABLET, FILM COATED ORAL at 11:07

## 2019-07-14 RX ADMIN — VANCOMYCIN HYDROCHLORIDE 1750 MG: 100 INJECTION, POWDER, LYOPHILIZED, FOR SOLUTION INTRAVENOUS at 11:07

## 2019-07-14 RX ADMIN — ACETAMINOPHEN 650 MG: 325 TABLET ORAL at 10:07

## 2019-07-14 RX ADMIN — KETAMINE HYDROCHLORIDE 10 MG: 10 INJECTION, SOLUTION INTRAMUSCULAR; INTRAVENOUS at 04:07

## 2019-07-14 RX ADMIN — PROPOFOL 100 MCG/KG/MIN: 10 INJECTION, EMULSION INTRAVENOUS at 04:07

## 2019-07-14 RX ADMIN — FENTANYL CITRATE 25 MCG: 50 INJECTION, SOLUTION INTRAMUSCULAR; INTRAVENOUS at 04:07

## 2019-07-14 RX ADMIN — VANCOMYCIN HYDROCHLORIDE 1250 MG: 1.25 INJECTION, POWDER, LYOPHILIZED, FOR SOLUTION INTRAVENOUS at 10:07

## 2019-07-14 RX ADMIN — PHENYLEPHRINE HYDROCHLORIDE 100 MCG: 10 INJECTION INTRAVENOUS at 05:07

## 2019-07-14 RX ADMIN — ATORVASTATIN CALCIUM 80 MG: 20 TABLET, FILM COATED ORAL at 08:07

## 2019-07-14 RX ADMIN — SODIUM CHLORIDE 0.6 UNITS/HR: 9 INJECTION, SOLUTION INTRAVENOUS at 08:07

## 2019-07-14 RX ADMIN — ONDANSETRON 4 MG: 2 INJECTION INTRAMUSCULAR; INTRAVENOUS at 11:07

## 2019-07-14 RX ADMIN — ENOXAPARIN SODIUM 40 MG: 100 INJECTION SUBCUTANEOUS at 06:07

## 2019-07-14 NOTE — NURSING TRANSFER
Nursing Transfer Note      7/14/2019     Transfer : surgery from [id number]    Transfer via: bed    Transfer with: sodium phosphate and insulin gtt    Transported by: surgery tech    Chart send with patient: Yes    Notified:by surgery charge nurse

## 2019-07-14 NOTE — SUBJECTIVE & OBJECTIVE
"Interval HPI:   Overnight events:   VS stable  BG levels at goal    Eating:   NPO  Nausea: No  Hypoglycemia and intervention: No  Fever: No  TPN and/or TF: No    BP (!) 170/96 (BP Location: Right arm, Patient Position: Lying)   Pulse 89   Temp 98.2 °F (36.8 °C) (Oral)   Resp 18   Ht 6' 1" (1.854 m)   Wt 89.6 kg (197 lb 8.5 oz)   SpO2 95%   BMI 26.06 kg/m²     Labs Reviewed and Include    Recent Labs   Lab 07/14/19  0326   *   CALCIUM 9.0   ALBUMIN 1.9*   PROT 6.9      K 3.3*   CO2 27      BUN 5*   CREATININE 0.7   ALKPHOS 112   ALT <5*   AST 10   BILITOT 0.3     Lab Results   Component Value Date    WBC 13.81 (H) 07/14/2019    HGB 10.9 (L) 07/14/2019    HCT 35.4 (L) 07/14/2019    MCV 92 07/14/2019     07/14/2019     No results for input(s): TSH, FREET4 in the last 168 hours.  Lab Results   Component Value Date    HGBA1C >14.0 (H) 07/12/2019       Nutritional status:   Body mass index is 26.06 kg/m².  Lab Results   Component Value Date    ALBUMIN 1.9 (L) 07/14/2019    ALBUMIN 1.8 (L) 07/14/2019    ALBUMIN 2.1 (L) 07/13/2019     No results found for: PREALBUMIN    Estimated Creatinine Clearance: 141.1 mL/min (based on SCr of 0.7 mg/dL).    Accu-Checks  Recent Labs     07/13/19  0339 07/13/19  0442 07/13/19  0531 07/13/19  0740 07/13/19  1135 07/13/19  1633 07/13/19  2159 07/13/19  2357 07/14/19  0624 07/14/19  0741   POCTGLUCOSE 219* 266* 261* 230* 203* 96 78 113* 152* 163*       Current Medications and/or Treatments Impacting Glycemic Control  Immunotherapy:    Immunosuppressants     None        Steroids:   Hormones (From admission, onward)    None        Pressors:    Autonomic Drugs (From admission, onward)    None        Hyperglycemia/Diabetes Medications:   Antihyperglycemics (From admission, onward)    Start     Stop Route Frequency Ordered    07/14/19 0715  insulin aspart U-100 pen 5 Units      -- SubQ 3 times daily with meals 07/13/19 2212 07/13/19 2045  insulin regular " (Humulin R) 100 Units in sodium chloride 0.9% 100 mL infusion      -- IV Continuous 07/13/19 1937 07/13/19 2037  insulin aspart U-100 pen 0-4 Units      -- SubQ As needed (PRN) 07/13/19 1937

## 2019-07-14 NOTE — PLAN OF CARE
Problem: Adult Inpatient Plan of Care  Goal: Plan of Care Review  Outcome: Ongoing (interventions implemented as appropriate)  Pt AAx4, breathing even and unlabored, call light in reach, bed in lowest position, mother at bedside throughout shift. Transitional insulin drip initiated, consulted endocrine, hold aspart while pt is NPO. Pt reported occasional nausea and vomiting. Administered PRN medication per orders. Administered PRN hydralazine for elevated BP. VSS, frequent hourly rounding completed. Pt currently in surgery for debridement. Will continue to monitor.

## 2019-07-14 NOTE — PLAN OF CARE
Reviewed BG levels and insulin regimen.     Goal BG while inpatient: 140-180 mg/dl  Current BG levels are above goal    Pt initially admitted with DKA.   Now resolved.   Currently receiving treatment for bacteremia secondary to osteomyelitis.   He is schedule for I&D tomorrow (NPO past midnight).      Recommendations:   -Switch to transition insulin drip at 0.8 units/h with stepdown protocol.   -Novolog 6 units with meals (when pt resumes eating)  -Low dose correction  -POC Q4 while NPO    Discharge Recommendations: TBD

## 2019-07-14 NOTE — PROGRESS NOTES
Pharmacokinetic Initial Assessment: IV Vancomycin    Assessment/Plan:    Initiate intravenous vancomycin with loading dose of 20mg/kg once followed by a maintenance dose of vancomycin 1.25mg IV every 12 hours  Desired empiric serum trough concentration is 10 to 20 mcg/mL.  Draw vancomycin trough level 30 min prior to fourth dose on 7/15/19 at approximately 2215  Pharmacy will continue to follow and monitor vancomycin.      Please contact pharmacy at extension [phone] with any questions regarding this assessment.     Thank you for the consult,   JOCE JENKINS     Patient brief summary:  Indio Ryder Jr. is a 49 y.o. male initiated on antimicrobial therapy with IV Vancomycin for treatment of suspected skin & soft tissue    Drug Allergies:   Review of patient's allergies indicates:  No Known Allergies    Actual Body Weight:   89.6 kg    Renal Function:   Estimated Creatinine Clearance: 141.1 mL/min (based on SCr of 0.7 mg/dL).,     Dialysis Method (if applicable):      CBC (last 72 hours):  Recent Labs   Lab Result Units 07/12/19  1023 07/13/19  0358 07/14/19  0326   WBC K/uL 14.21* 12.65 13.81*   Hemoglobin g/dL 12.0* 11.2* 10.9*   Hemoglobin A1C % >14.0*  --   --    Hematocrit % 37.7* 37.8* 35.4*   Platelets K/uL 380* 329 340   Gran% % 81.8* 75.7* 78.1*   Lymph% % 10.2* 13.9* 12.8*   Mono% % 7.3 9.4 8.5   Eosinophil% % 0.1 0.2 0.1   Basophil% % 0.2 0.3 0.2   Differential Method  Automated Automated Automated       Metabolic Panel (last 72 hours):  Recent Labs   Lab Result Units 07/12/19  1023 07/12/19  1037 07/12/19  1454 07/12/19  1500 07/12/19  2032 07/13/19  0029 07/13/19  0358 07/13/19  1207 07/13/19  1734 07/14/19  0004 07/14/19  0326   Sodium mmol/L 141  --  142  --  144 143 141 142 143 143 139   Potassium mmol/L 3.8  --  3.7  --  3.5 3.4* 4.0 3.4* 3.6 3.2* 3.3*   Chloride mmol/L 97  --  105  --  103 109 105 103 103 104 102   CO2 mmol/L 23  --  23  --  25 23 25 28 31* 27 27   Glucose mg/dL 349*  --  232*   --  230* 232* 239* 200* 104 113* 134*   Glucose, UA   --   --   --  3+*  --   --   --   --   --   --   --    BUN, Bld mg/dL 14  --  11  --  9 8 9 6 5* 5* 5*   Creatinine mg/dL 1.3  --  1.0  --  1.0 0.9 1.0 0.9 0.8 0.8 0.7   Albumin g/dL 2.5*  --  2.1*  --  2.1* 1.7* 2.1* 2.0* 2.1* 1.8* 1.9*   Total Bilirubin mg/dL 0.4  --   --   --  0.3 0.3 0.3 0.3 0.3 0.2 0.3   Alkaline Phosphatase U/L 148*  --   --   --  113 94 124 108 109 108 112   AST U/L 7*  --   --   --  6* 5* 8* 6* 7* 8* 10   ALT U/L 5*  --   --   --  <5* <5* <5* <5* <5* <5* <5*   Magnesium mg/dL  --  1.7  --   --   --   --  2.0  --   --   --  1.7   Phosphorus mg/dL  --   --  1.5*  --   --   --  3.2  --   --   --  2.5*       Drug levels (last 3 results):  No results for input(s): VANCOMYCINRA, VANCOMYCINPE, VANCOMYCINTR in the last 72 hours.    Microbiologic Results:  Microbiology Results (last 7 days)     Procedure Component Value Units Date/Time    Blood culture x two cultures. Draw prior to antibiotics. [[id number]]  (Abnormal) Collected:  07/12/19 1044    Order Status:  Completed Specimen:  Blood from Peripheral, Antecubital, Right Updated:  07/14/19 0947     Blood Culture, Routine Gram stain mary bottle: Gram positive cocci in chains resembling Strep      Results called to and read back by:Farrah Ayala RN 07/12/2019        22:47      Gram stain aer bottle: Gram positive cocci in chains resembling Strep       07/13/2019  06:46      STREPTOCOCCUS AGALACTIAE (GROUP B)  Beta-hemolytic streptococci are routinely susceptible to   penicillins,cephalosporins and carbapenems.  For susceptibility see order #[id number]      Narrative:       Aerobic and anaerobic    Blood culture x two cultures. Draw prior to antibiotics. [[id number]]  (Abnormal)  (Susceptibility) Collected:  07/12/19 1044    Order Status:  Completed Specimen:  Blood from Peripheral, Antecubital, Left Updated:  07/14/19 0927     Blood Culture, Routine Gram stain mary bottle: Gram positive cocci in  chains resembling Strep      Results called to and read back by:Farrah Ayala RN 07/12/2019        22:46      Gram stain aer bottle: Gram positive cocci in chains resembling Strep       07/13/2019  06:43      STREPTOCOCCUS AGALACTIAE (GROUP B)  Beta-hemolytic streptococci are routinely susceptible to   penicillins,cephalosporins and carbapenems.      Narrative:       Aerobic and anaerobic    Aerobic culture [[id number]]  (Abnormal) Collected:  07/12/19 1257    Order Status:  Completed Specimen:  Wound from Foot, Left Updated:  07/14/19 0927     Aerobic Bacterial Culture STREPTOCOCCUS AGALACTIAE (GROUP B)  Few  Beta-hemolytic streptococci are routinely susceptible to   penicillins,cephalosporins and carbapenems.  Susceptibility testing not routinely performed        STAPHYLOCOCCUS AUREUS  Few  Susceptibility pending      Blood culture [[id number]] Collected:  07/14/19 0629    Order Status:  Sent Specimen:  Blood Updated:  07/14/19 0706    Blood culture [[id number]] Collected:  07/13/19 0358    Order Status:  Completed Specimen:  Blood Updated:  07/14/19 0613     Blood Culture, Routine No Growth to date      No Growth to date    Blood culture [[id number]] Collected:  07/13/19 0358    Order Status:  Completed Specimen:  Blood Updated:  07/14/19 0613     Blood Culture, Routine No Growth to date      No Growth to date    Culture, Anaerobe [[id number]] Collected:  07/12/19 1257    Order Status:  Sent Specimen:  Wound from Foot, Left Updated:  07/12/19 1311

## 2019-07-14 NOTE — BRIEF OP NOTE
[facility]  Brief Operative Note    SUMMARY     Surgery Date: 7/14/2019     Surgeon(s) and Role:     * Sis Hickman DPM - Primary     * Romy Hi PGY1- Resident - Assisting        Pre-op Diagnosis:  Left foot infection [L08.9]  Sepsis, due to unspecified organism [A41.9]    Post-op Diagnosis:  Post-Op Diagnosis Codes:     * Left foot infection [L08.9]     * Sepsis, due to unspecified organism [A41.9]    Procedure(s) (LRB):  Incision and drainage to bone of foot (Left)   DEBRIDEMENT of right foot ulcer (Right)    Anesthesia: Local MAC    Description of Procedure:   Irrigation and drainage of left foot with pulse lavage; Debridement of plantar right foot ulcer    Description of the findings of the procedure:   Right sub 4th MTP joint fibrotic ulcer measuring .[id number].1 cm was debrided 100%  to healthy granular wound base to the level of adipose tissue  measuring measuring [id number] .2cm.  No tunneling. Negative probe to bone. Viable periwound.  Left lateral plantar ulcer at 5th MTP joint was debrided revealing nonviable tissue at the level of skin, fat, subQ, fascia with exposed 5th metatarsal head and base of proximal phalanx. 5th metatarsal head appears viable and salvageable. Left 5th metatarsal head bone biopsy was sent to pathology. Purulent drainage was expressed from the distal forefoot and proximally from the calcaneus. Pulse lavage was used to irrigate right foot.      Estimated Blood Loss: 20 mL       Specimens:   Specimen (12h ago, onward)    Start     Ordered    07/14/19 1716  Specimen to Pathology - Surgery  Once     Comments:  1) Left 5th metatarsal (perm)     Start Status     07/14/19 1716 Collected (07/14/19 1728) Order ID: [id number]       07/14/19 1728

## 2019-07-14 NOTE — ASSESSMENT & PLAN NOTE
Pt has poorly controlled type 2 DM due to non-compliance (had insurance issues).   Initially admitted for DKA. Now resolved.   Schedule for I&D today. Currently NPO    Plan:   -Continue transition insulin drip at 0.6 units/h with stepdown regimen.  -Resume Novolog 5 units with meals when pt starts eating.   -Low dose correction  -POC AC/HS    Discharge Recommendations: TBD

## 2019-07-14 NOTE — NURSING TRANSFER
Nursing Transfer Note      7/14/2019     Transfer To: [id number]    Transfer via bed    Transfer with cardiac monitoring, insulin infusion    Transported by Pt escort    Medicines sent: None    Chart send with patient: Yes    Notified: significant other    Patient reassessed at: 7/14/19 9164    Upon arrival to floor: patient oriented to room, call bell in reach and bed in lowest position

## 2019-07-14 NOTE — ASSESSMENT & PLAN NOTE
- DKA on admission due to noncompliance of insulin use combine with Osteomyelitis of foot  - Now resolved

## 2019-07-14 NOTE — PROGRESS NOTES
Progress Note  Hospital Medicine    Admit Date: 7/12/2019  Length of Stay:  LOS: 2 days     SUBJECTIVE:         Follow-up For:  Acute osteomyelitis    HPI/Interval history (See H&P for complete P,F,SHx) :   Over view  Indio Ryder Jr. is a 49 y.o. male with a PMH of T2DM (poorly  controlled, with long term insulin use) and HTN who presented to the ED on 7/12/2019 diagnosed with  Vomiting (vomiting for past 4 days, denies sick contacts)   Oriented x3 accompanied by daughter at the bedside.  Mentions that he wears lower extremity stockings for edema. Reportedly formed a new ulcer on the left lateral aspect of the foot about 10 days back.  had 4-5 days of nausea and vomiting. Vomiting is non-bloody, No abdominal pain..  It occurs immediately after eating and he has not been able to keep anything down. Associated with fevers and chills but no objective temperature measurement.  At baseline only takes insulin once a day stop taking more than a week because of nausea and vomiting and poor oral intake.  Does not check fingersticks regularly at home. recently lost his insurance and has been unable to follow up with wound care.  His last follow-up with primary care provider and podiatrist was about 2 years.  He has been managing his right foot ulcer at home with dressing but over the past 2 weeks an ulcer on his left foot has developed. He denies any pain or injury- has chronic numbness of bilateral lower extremities and hands from diabetic neuropathy.  Found to be in DKA in the emergency department with beta hydroxybutyrate elevated at 4.1.  Started on insulin drip    07/13/2019   Temperature of 101.5 last night.  Anion gap resolved- bicarbonate 23, insulin drip discontinued.  Started on Levemir 23 units q.a.m. 7 units as part t.i.d. with meals with low-dose sliding scale.  Diabetic diet and NPO from midnight for possible intervention.  MRI of the foot- Ulceration involving the lateral plantar aspect of the distal left  foot, with findings concerning for exposed bone involving the distal aspect of the 5th metatarsal and proximal phalange of the 5th toe.  There are associated changes concerning for osteomyelitis involving the distal aspect of the 5th metatarsal and entirety of the right toe.  There is osseous hyperemia involving the proximal phalange of the 2nd toe, early changes of osteomyelitis are a consideration . aerobic culture  and blood culture with Group B strep. discontinued vancomycin       Interval history  Had urinary retention of 800 mL but spontaneously voided.  Started on transitional insulin drip as NPO from midnight for OR today.  Hypokalemia repeat.  Blood cultures daily until clear.  Aerobic cultures with group B Streptococcus and Staph aureus.  Added vancomycin ( monitor for toxicity)      Review of Systems: List if applicable  Constitutional: Positive for activity change, appetite change (Poor appetite for the last and), chills, fatigue, fever ( weight loss) and unexpected weight change.   HENT: Negative for congestion, ear discharge, ear pain, facial swelling and sore throat.    Eyes: Negative for pain, discharge and itching.   Respiratory: Positive for shortness of breath ( at rest and exertion ). Negative for cough, chest tightness and wheezing.    Cardiovascular: Positive for leg swelling ( chronic). Negative for chest pain and palpitations.   Gastrointestinal: Positive for vomiting. Negative for abdominal distention, abdominal pain, blood in stool, constipation and diarrhea.   Endocrine: Negative for cold intolerance.   Genitourinary: Negative for dysuria, hematuria and urgency.   Musculoskeletal: Negative for arthralgias, gait problem, myalgias, neck pain and neck stiffness.   Skin: Negative for color change, pallor and rash.   Allergic/Immunologic: Negative for environmental allergies.   Neurological: Positive for weakness and numbness ( bilateral lower extremities and hands attributes to diabetic  neuropathy). Negative for dizziness, syncope, light-headedness and headaches.   Hematological: Negative for adenopathy.   Psychiatric/Behavioral: Negative for agitation, behavioral problems and confusion.     OBJECTIVE:     Vital Signs Range (Last 24H):  Temp:  [98.9 °F (37.2 °C)-99.7 °F (37.6 °C)]   Pulse:  []   Resp:  [18-20]   BP: (138-183)/(80-94)   SpO2:  [91 %-98 %]     Physical Exam:  Constitutional: He is oriented to person, place, and time. He appears well-developed and well-nourished.   HENT:   Head: Normocephalic and atraumatic.   Mouth/Throat: Oropharynx is clear and moist. No oropharyngeal exudate.   Eyes: Pupils are equal, round, and reactive to light. Conjunctivae and EOM are normal. Right eye exhibits no discharge. Left eye exhibits no discharge. No scleral icterus.   Neck: Normal range of motion. Neck supple. No JVD present. No tracheal deviation present. No thyromegaly present.   Cardiovascular: Normal rate, regular rhythm and normal heart sounds. Exam reveals no gallop and no friction rub.   No murmur heard.  Pulmonary/Chest: Effort normal and breath sounds normal. No stridor. No respiratory distress. He has no wheezes. He has no rales.   Abdominal: Soft. Bowel sounds are normal. He exhibits no distension and no mass. There is no tenderness. There is no guarding.   Musculoskeletal: Normal range of motion. He exhibits edema.   Lymphadenopathy:     He has no cervical adenopathy.   Neurological: He is oriented to person, place, and time. No cranial nerve deficit.   Able to move upper and lower extremities without limitation   Skin: Skin is warm and dry.   Left foot: 6cm ulcer with exposed subcutaneous fat and eschar with foul smelling, purulent drainage overlying lateral aspect of left 5th MTP.     Right foot: 1.5cm clean based ulcer at base of right 5th MTP joint. Right great toe and second toe surgically amputated.     Psychiatric: He has a normal mood and affect. His behavior is normal.    Nursing note and vitals reviewed    Medications:  Medication list was reviewed and changes noted under Assessment/Plan.      Current Facility-Administered Medications:     acetaminophen tablet 650 mg, 650 mg, Oral, Q6H PRN, Vicente Wick MD, 650 mg at 07/13/19 2211    amLODIPine tablet 5 mg, 5 mg, Oral, Daily, Vicente Wick MD, 5 mg at 07/13/19 1232    atorvastatin tablet 80 mg, 80 mg, Oral, Daily, Vicente Wick MD, 80 mg at 07/12/19 1402    calcium carbonate 200 mg calcium (500 mg) chewable tablet 1,000 mg, 1,000 mg, Oral, BID PRN, Antonio Tate PA-C, 1,000 mg at 07/13/19 0546    dextrose 10% (D10W) Bolus, 12.5 g, Intravenous, PRN, Vicente Wick MD    dextrose 10% (D10W) Bolus, 25 g, Intravenous, PRN, Vicente Wick MD    enoxaparin injection 40 mg, 40 mg, Subcutaneous, Daily, Vicente Wick MD, 40 mg at 07/13/19 1636    glucagon (human recombinant) injection 1 mg, 1 mg, Intramuscular, PRN, Roselyn Chow MD    glucose chewable tablet 16 g, 16 g, Oral, PRN, Vicente Wick MD    glucose chewable tablet 16 g, 16 g, Oral, PRN, Roselyn Chow MD    glucose chewable tablet 24 g, 24 g, Oral, PRN, Vicente Wick MD    glucose chewable tablet 24 g, 24 g, Oral, PRN, Roselyn Chow MD    hydrALAZINE tablet 25 mg, 25 mg, Oral, Q8H PRN, Vicente Wick MD, 25 mg at 07/13/19 1706    insulin aspart U-100 pen 0-4 Units, 0-4 Units, Subcutaneous, PRN, Roselyn Chow MD    insulin aspart U-100 pen 5 Units, 5 Units, Subcutaneous, TIDWM, Roselyn Chow MD    insulin regular (Humulin R) 100 Units in sodium chloride 0.9% 100 mL infusion, 0.6 Units/hr, Intravenous, Continuous, Roselyn Chow MD    ondansetron injection 4 mg, 4 mg, Intravenous, Q6H PRN, Vicente Wick MD, 4 mg at 07/13/19 2212    piperacillin-tazobactam 4.5 g in sodium chloride 0.9% 100 mL IVPB (ready to mix system), 4.5 g, Intravenous, Q8H, Trung Fall MD, Last Rate: 25 mL/hr at 07/13/19 2203, 4.5 g at 07/13/19  2203    potassium chloride 10 mEq in 100 mL IVPB, 10 mEq, Intravenous, Q1H, Vicente Wick MD    promethazine (PHENERGAN) 6.25 mg in dextrose 5 % 50 mL IVPB, 6.25 mg, Intravenous, Q6H PRN, Vicente Wick MD    sodium chloride 0.9% flush 10 mL, 10 mL, Intravenous, PRN, Vicente Wick MD    sodium phosphate 15 mmol in dextrose 5 % 250 mL IVPB, 15 mmol, Intravenous, Once, Vicente Wick MD    acetaminophen, calcium carbonate, Dextrose 10% Bolus, Dextrose 10% Bolus, glucagon (human recombinant), glucose, glucose, glucose, glucose, hydrALAZINE, insulin aspart U-100, ondansetron, promethazine (PHENERGAN) IVPB, sodium chloride 0.9%    Laboratory/Diagnostic Data:  Reviewed and noted in plan where applicable- Please see chart for full lab data.    Recent Labs   Lab 07/12/19  1023 07/13/19  0358 07/14/19  0326   WBC 14.21* 12.65 13.81*   HGB 12.0* 11.2* 10.9*   HCT 37.7* 37.8* 35.4*   * 329 340       Recent Labs   Lab 07/12/19  1037 07/12/19  1454  07/13/19  0358  07/13/19  1734 07/14/19  0004 07/14/19  0326   NA  --  142   < > 141   < > 143 143 139   K  --  3.7   < > 4.0   < > 3.6 3.2* 3.3*   CL  --  105   < > 105   < > 103 104 102   CO2  --  23   < > 25   < > 31* 27 27   BUN  --  11   < > 9   < > 5* 5* 5*   CREATININE  --  1.0   < > 1.0   < > 0.8 0.8 0.7   GLU  --  232*   < > 239*   < > 104 113* 134*   CALCIUM  --  9.4   < > 9.0   < > 9.2 8.6* 9.0   MG 1.7  --   --  2.0  --   --   --  1.7   PHOS  --  1.5*  --  3.2  --   --   --  2.5*   LIPASE <3*  --   --   --   --   --   --   --     < > = values in this interval not displayed.       Recent Labs   Lab 07/13/19  1734 07/14/19  0004 07/14/19  0326   ALKPHOS 109 108 112   ALT <5* <5* <5*   AST 7* 8* 10   ALBUMIN 2.1* 1.8* 1.9*   PROT 7.5 6.8 6.9   BILITOT 0.3 0.2 0.3        Microbiology labs for the last week  Microbiology Results (last 7 days)     Procedure Component Value Units Date/Time    Blood culture [[id number]]     Order Status:  Sent  Specimen:  Blood     Blood culture [[id number]] Collected:  07/13/19 0358    Order Status:  Completed Specimen:  Blood Updated:  07/14/19 0613     Blood Culture, Routine No Growth to date      No Growth to date    Blood culture [[id number]] Collected:  07/13/19 0358    Order Status:  Completed Specimen:  Blood Updated:  07/14/19 0613     Blood Culture, Routine No Growth to date      No Growth to date    Blood culture x two cultures. Draw prior to antibiotics. [[id number]]  (Abnormal) Collected:  07/12/19 1044    Order Status:  Completed Specimen:  Blood from Peripheral, Antecubital, Right Updated:  07/13/19 0825     Blood Culture, Routine Gram stain mary bottle: Gram positive cocci in chains resembling Strep      Results called to and read back by:Farrah Ayala RN 07/12/2019        22:47      Gram stain aer bottle: Gram positive cocci in chains resembling Strep       07/13/2019  06:46      STREPTOCOCCUS AGALACTIAE (GROUP B)  Beta-hemolytic streptococci are routinely susceptible to   penicillins,cephalosporins and carbapenems.  For susceptibility see order #[id number]      Narrative:       Aerobic and anaerobic    Blood culture x two cultures. Draw prior to antibiotics. [[id number]]  (Abnormal) Collected:  07/12/19 1044    Order Status:  Completed Specimen:  Blood from Peripheral, Antecubital, Left Updated:  07/13/19 0825     Blood Culture, Routine Gram stain mary bottle: Gram positive cocci in chains resembling Strep      Results called to and read back by:Farrah Ayala RN 07/12/2019        22:46      Gram stain aer bottle: Gram positive cocci in chains resembling Strep       07/13/2019  06:43      STREPTOCOCCUS AGALACTIAE (GROUP B)  Susceptibility pending  Beta-hemolytic streptococci are routinely susceptible to   penicillins,cephalosporins and carbapenems.      Narrative:       Aerobic and anaerobic    Aerobic culture [[id number]]  (Abnormal) Collected:  07/12/19 1257    Order Status:  Completed Specimen:   Wound from Foot, Left Updated:  07/13/19 0818     Aerobic Bacterial Culture STREPTOCOCCUS AGALACTIAE (GROUP B)  Few  Beta-hemolytic streptococci are routinely susceptible to   penicillins,cephalosporins and carbapenems.  Susceptibility testing not routinely performed      Culture, Anaerobe [[id number]] Collected:  07/12/19 1257    Order Status:  Sent Specimen:  Wound from Foot, Left Updated:  07/12/19 1311           Imaging Results          MRI Foot (Forefoot) Left Without Contrast (Final result)  Result time 07/12/19 19:07:38    Final result by Bob Oglesby MD (07/12/19 19:07:38)             Impression:      Ulceration involving the lateral plantar aspect of the distal left foot, with findings concerning for exposed bone involving the distal aspect of the 5th metatarsal and proximal phalange of the 5th toe.  There are associated changes concerning for osteomyelitis involving the distal aspect of the 5th metatarsal and entirety of the right toe.  There is osseous hyperemia involving the proximal phalange of the 2nd toe, early changes of osteomyelitis are a consideration no definite low signal on T1 at this time.      Electronically signed by: Bob Oglesby MD  Date:    07/12/2019  Time:    19:07           Narrative:    EXAMINATION:  MRI FOOT (FOREFOOT) LEFT WITHOUT CONTRAST    CLINICAL HISTORY:  Open wound of ankle, foot and toes;    TECHNIQUE:  Multiplanar multisequence MRI images of the left forefoot were obtained without administration of IV contrast.    COMPARISON:  Radiograph 07/12/2019    FINDINGS:  There is soft tissue ulceration involving the lateral plantar aspect of the right foot, noting there appears to be exposed bone in the location, likely involving the distal aspect of the 5th metatarsal head, and proximal phalange of the 5th toe. There is diffusely increased STIR signal within the distal aspect of the 5th metatarsal, and throughout the phalanges of the 5th toe. There is corresponding low T1  signal involving the same regions, concerning for osteomyelitis. There is additional abnormal STIR signal within the base of the proximal phalange of the 2nd toe, without convincing low signal on T1, suggesting osseous hyperemia although early changes of osteomyelitis remain a consideration. There is edema and induration about the open wound, no convincing focal organized fluid collection.  The remainder of the osseous structures are grossly unremarkable. No significant joint effusions.  There is reactive edema about the intrinsic musculature of the foot, as well as along the dorsal surface.                              X-Ray Foot Complete Left (Final result)  Result time 07/12/19 11:06:16    Final result by Chandan Montana MD (07/12/19 11:06:16)             Impression:      Abnormal examination with findings strongly suggesting osteomyelitis involving the base of the proximal phalanx of the left 5th toe and likely the head of the 5th metatarsal as well.    This report was flagged in [employer] as abnormal.      Electronically signed by: Chandan Montana MD  Date:    07/12/2019  Time:    11:06           Narrative:    EXAMINATION:  XR FOOT COMPLETE 3 VIEW LEFT    TECHNIQUE:  Three views of the left foot were obtained, with AP, lateral, and oblique projections submitted.    COMPARISON:  No relevant comparison examinations are currently available.    FINDINGS:  Focal soft tissue loss/irregularity involving the region of the left 5th MTP joint is observed, with gas present within the soft tissues at this level, the findings likely related to a clinically evident ulceration.  There is focal lytic bone destruction involving the base of the proximal phalanx of the 5th toe, and possibly involving the head of the 5th metatarsal as well, this radiographic appearance strongly suggesting osteomyelitis.  The remaining osseous structures appear intact, with no evidence of recent fracture and no additional areas of focal lytic bone destruction  "noted.  Some arterial vascular calcification in the soft tissues about the ankle is incidentally observed.                             X-Ray Chest AP Portable (Final result)  Result time 07/12/19 10:48:19    Final result by Chandan Montana MD (07/12/19 10:48:19)             Impression:      No significant intrathoracic abnormality.  Allowing for differences in projection and a poorer inspiratory depth level on the current examination, there has been no significant detrimental interval change in the appearance of the chest since 05/03/2016.      Electronically signed by: Chandan Montana MD  Date:    07/12/2019  Time:    10:48           Narrative:    EXAMINATION:  XR CHEST AP PORTABLE    CLINICAL HISTORY:  hyperglycemia;    COMPARISON:  Comparison is made to the most recent prior chest radiograph, dated 05/03/2016.    FINDINGS:  Heart size is normal, as is the appearance of the pulmonary vascularity.  Lung zones are clear, and free of significant airspace consolidation or volume loss.  No pleural fluid.  No abnormal mediastinal widening.  No pneumothorax.  Incidental note is made of some spurring at the right acromioclavicular joint level.                              Estimated body mass index is 25.97 kg/m² as calculated from the following:    Height as of this encounter: 6' 1" (1.854 m).    Weight as of this encounter: 89.3 kg (196 lb 13.9 oz).    I & O (Last 24H):    Intake/Output Summary (Last 24 hours) at 7/14/2019 0619  Last data filed at 7/14/2019 0400  Gross per 24 hour   Intake 1051.25 ml   Output 1000 ml   Net 51.25 ml       Estimated Creatinine Clearance: 141.1 mL/min (based on SCr of 0.7 mg/dL).    ASSESSMENT/PLAN:     Active Problems:    Active Diagnoses:     Diagnosis Date Noted POA    PRINCIPAL PROBLEM:  Acute osteomyelitis [M86.10] x-ray left foot - strongly suggesting osteomyelitis involving the base of the proximal phalanx of the left 5th toe and likely the head of the 5th metatarsal as well.  Obtain MRI " the foot without contrast.  Wound cultures pending      recently lost his insurance and has been unable to follow up with wound care.  His last follow-up with primary care provider and podiatrist was about 2 years.  He has been managing his right foot ulcer at home with dressing but over the past 2 weeks an ulcer on his left foot has developed. He denies any pain or injury- has chronic numbness of bilateral lower extremities and hands from diabetic neuropathy.  Started on IV Zosyn and vanc.  Podiatry and Infectious Disease consult.  aerobic culture with Group B strep.  Add vancomycin for Staph aureus  (monitor for toxicity)   07/12/2019 Yes    Sepsis [A41.9] /bacteremia - blood cultures from 07/12/2019 group B Streptococcus. secondary to diabetic foot ulcer.  Elevated ESR greater than 120 As above 07/12/2019 Yes    Leukocytosis [D72.829] 14 secondary to sepsis. resolved  07/12/2019 Unknown    Anemia [D64.9] hemoglobin at 12, normocytic.  Monitor 07/12/2019 Unknown    Type 2 diabetes mellitus with left eye affected by mild nonproliferative retinopathy without macular edema, without long-term current use of insulin [E11.3292]At baseline only takes insulin once a day stopped  taking more than a week because of nausea and vomiting and poor oral intake.  Does not check fingersticks regularly at home. recently lost his insurance.  Obtain HbA1c elevated greater than 14.  Continue insulin drip.  Endocrine evaluation 08/11/2017 Yes       Chronic    Diabetic ketoacidosis without coma associated with type 2 diabetes mellitus [E11.10]  Found to be in DKA in the emergency department with beta hydroxybutyrate elevated at 4.1.  Started on insulin drip.  Received normal saline in the emergency department    07/13/2019  Anion gap resolved- bicarbonate 23, insulin drip discontinued.  Started on Levemir 23 units q.a.m. 7 units as part t.i.d. with meals with low-dose sliding scale.  Diabetic diet and NPO from midnight for possible  intervention    07/14/2019   Started on transitional insulin drip as NPO from midnight.  Hypokalemia repeat.  Blood cultures daily until clear      Hypokalemia- 3.3 replaced    Hypophosphatemia- placed   05/30/2017 Unknown    Type 2 diabetes mellitus with diabetic neuropathy, with long-term current use of insulin [E11.40, Z79.4] 05/03/2016 Not Applicable       Chronic    Mixed hyperlipidemia [E78.2] continue with Lipitor 08/12/2014 Yes    Essential hypertension [I10] blood pressure controlled without medications.  Monitor  06/06/2013 Yes       Chronic               Disposition- home    DVT prophylaxis addressed with:  Brian Wick MD  Attending Staff Physician  Hospital Medicine  pager- [phone] [id number] - [phone]

## 2019-07-14 NOTE — TRANSFER OF CARE
"Anesthesia Transfer of Care Note    Patient: Indio Ryder Jr.    Procedure(s) Performed: Procedure(s) (LRB):  DEBRIDEMENT, FOOT, with left 5th ray amputation (Left)  IRRIGATION AND DEBRIDEMENT of right foot ulcer (Right)    Patient location: PACU    Anesthesia Type: general    Transport from OR: Transported from OR on 6-10 L/min O2 by face mask with adequate spontaneous ventilation    Post pain: adequate analgesia    Post assessment: no apparent anesthetic complications    Post vital signs: stable    Level of consciousness: awake    Nausea/Vomiting: no nausea/vomiting    Complications: none    Transfer of care protocol was followed      Last vitals:   Visit Vitals  BP (!) 95/58 (BP Location: Left arm, Patient Position: Lying)   Pulse 87   Temp 36.8 °C (98.2 °F) (Temporal)   Resp 16   Ht 6' 1" (1.854 m)   Wt 89.6 kg (197 lb 8.5 oz)   SpO2 99%   BMI 26.06 kg/m²     "

## 2019-07-14 NOTE — PLAN OF CARE
Chart reviewed.  GBS bacteremia.   Foot cultures now with staph aureus as well as GBS.     Will add vancomycin for staph aureus  Continue zosyn for now (for GBS and empirically for presumed polymicrobial foot infection). Plan is for debridement tomorrow. Please obtain cultures of clean margins.   Will de-escalate once final culture results are back.   Call with questions.   Thanks, Tatyana Knutson PA-C

## 2019-07-14 NOTE — PROGRESS NOTES
"[facility]  Endocrinology  Progress Note    Admit Date: 7/12/2019     Reason for Consult: Management of T2DM, Hyperglycemia, DKA    Surgical Procedure and Date: n/a    Diabetes diagnosis year: >20 years, 1990s?    Home Diabetes Medications:  Metformin 500mg BID, Lantus 26U QHS    How often checking glucose at home? None  BG readings on regimen: n/a  Hypoglycemia on the regimen?  No  Missed doses on regimen?  No    Diabetes Complications include:     Hyperglycemia and Foot ulcer      Complicating diabetes co morbidities:  HTN      HPI:   Patient is a 49 y.o. male with a diagnosis of Type 2 DM (noncomplaince, skip Levemir 1-2x a week), HTN, PVD, hx of right foot osteomyelitis who was admitted to hospital medicine for DKA and L foot ulcer. He report  4-5 days of nausea and vomiting. Vomiting is non-bloody. He has not been able to keep anything down. Pt hasn't been administering his insulin during this time due to the fact that he has not been able to keep anything down. He was found to have a large open wound on L foot, pt recently lost his insurance and has been unable to follow up with wound care. Xray showed new Osteomyelitis of L foot.  Pt report skipping his Lantus at home 1-2x a week, not on insulin with meal. Was on Trulicity but stopped due to lost of insurance. BHOB was 4.1, A1C >14, Anion gap of 21, glucose 349. Endocrine was consulted for "DKA", diabetes management.         Interval HPI:   Overnight events:   VS stable  BG levels at goal    Eating:   NPO  Nausea: No  Hypoglycemia and intervention: No  Fever: No  TPN and/or TF: No    BP (!) 170/96 (BP Location: Right arm, Patient Position: Lying)   Pulse 89   Temp 98.2 °F (36.8 °C) (Oral)   Resp 18   Ht 6' 1" (1.854 m)   Wt 89.6 kg (197 lb 8.5 oz)   SpO2 95%   BMI 26.06 kg/m²      Labs Reviewed and Include    Recent Labs   Lab 07/14/19  0326   *   CALCIUM 9.0   ALBUMIN 1.9*   PROT 6.9      K 3.3*   CO2 27      BUN " 5*   CREATININE 0.7   ALKPHOS 112   ALT <5*   AST 10   BILITOT 0.3     Lab Results   Component Value Date    WBC 13.81 (H) 07/14/2019    HGB 10.9 (L) 07/14/2019    HCT 35.4 (L) 07/14/2019    MCV 92 07/14/2019     07/14/2019     No results for input(s): TSH, FREET4 in the last 168 hours.  Lab Results   Component Value Date    HGBA1C >14.0 (H) 07/12/2019       Nutritional status:   Body mass index is 26.06 kg/m².  Lab Results   Component Value Date    ALBUMIN 1.9 (L) 07/14/2019    ALBUMIN 1.8 (L) 07/14/2019    ALBUMIN 2.1 (L) 07/13/2019     No results found for: PREALBUMIN    Estimated Creatinine Clearance: 141.1 mL/min (based on SCr of 0.7 mg/dL).    Accu-Checks  Recent Labs     07/13/19  0339 07/13/19  0442 07/13/19  0531 07/13/19  0740 07/13/19  1135 07/13/19  1633 07/13/19  2159 07/13/19  2357 07/14/19  0624 07/14/19  0741   POCTGLUCOSE 219* 266* 261* 230* 203* 96 78 113* 152* 163*       Current Medications and/or Treatments Impacting Glycemic Control  Immunotherapy:    Immunosuppressants     None        Steroids:   Hormones (From admission, onward)    None        Pressors:    Autonomic Drugs (From admission, onward)    None        Hyperglycemia/Diabetes Medications:   Antihyperglycemics (From admission, onward)    Start     Stop Route Frequency Ordered    07/14/19 0715  insulin aspart U-100 pen 5 Units      -- SubQ 3 times daily with meals 07/13/19 2212 07/13/19 2045  insulin regular (Humulin R) 100 Units in sodium chloride 0.9% 100 mL infusion      -- IV Continuous 07/13/19 1937 07/13/19 2037  insulin aspart U-100 pen 0-4 Units      -- SubQ As needed (PRN) 07/13/19 1937          ASSESSMENT and PLAN    * Acute osteomyelitis  - New L foot OM  - management per primary and Podiatry  - On IV Abx      Diabetic ketoacidosis without coma associated with type 2 diabetes mellitus  - DKA on admission due to noncompliance of insulin use combine with Osteomyelitis of foot  - Now resolved    Type 2 diabetes  mellitus with hyperglycemia, with long-term current use of insulin  Pt has poorly controlled type 2 DM due to non-compliance (had insurance issues).   Initially admitted for DKA. Now resolved.   Schedule for I&D today. Currently NPO    Plan:   -Continue transition insulin drip at 0.6 units/h with stepdown regimen.  -Resume Novolog 5 units with meals when pt starts eating.   -Low dose correction  -POC AC/HS    Discharge Recommendations: TBD    Essential hypertension  - manage per primary          Roselyn Chow MD  Endocrinology  [facility]

## 2019-07-15 LAB
ALBUMIN SERPL BCP-MCNC: 1.9 G/DL (ref 3.5–5.2)
ALBUMIN SERPL BCP-MCNC: 1.9 G/DL (ref 3.5–5.2)
ALP SERPL-CCNC: 104 U/L (ref 55–135)
ALP SERPL-CCNC: 106 U/L (ref 55–135)
ALT SERPL W/O P-5'-P-CCNC: 5 U/L (ref 10–44)
ALT SERPL W/O P-5'-P-CCNC: <5 U/L (ref 10–44)
ANION GAP SERPL CALC-SCNC: 13 MMOL/L (ref 8–16)
ANION GAP SERPL CALC-SCNC: 14 MMOL/L (ref 8–16)
AST SERPL-CCNC: 13 U/L (ref 10–40)
AST SERPL-CCNC: 9 U/L (ref 10–40)
BACTERIA SPEC AEROBE CULT: ABNORMAL
BACTERIA SPEC AEROBE CULT: ABNORMAL
BASOPHILS # BLD AUTO: 0.03 K/UL (ref 0–0.2)
BASOPHILS NFR BLD: 0.2 % (ref 0–1.9)
BILIRUB SERPL-MCNC: 0.3 MG/DL (ref 0.1–1)
BILIRUB SERPL-MCNC: 0.4 MG/DL (ref 0.1–1)
BUN SERPL-MCNC: 6 MG/DL (ref 6–20)
BUN SERPL-MCNC: 7 MG/DL (ref 6–20)
CALCIUM SERPL-MCNC: 8.8 MG/DL (ref 8.7–10.5)
CALCIUM SERPL-MCNC: 8.8 MG/DL (ref 8.7–10.5)
CHLORIDE SERPL-SCNC: 100 MMOL/L (ref 95–110)
CHLORIDE SERPL-SCNC: 103 MMOL/L (ref 95–110)
CO2 SERPL-SCNC: 25 MMOL/L (ref 23–29)
CO2 SERPL-SCNC: 25 MMOL/L (ref 23–29)
CREAT SERPL-MCNC: 0.9 MG/DL (ref 0.5–1.4)
CREAT SERPL-MCNC: 1.1 MG/DL (ref 0.5–1.4)
DIFFERENTIAL METHOD: ABNORMAL
EOSINOPHIL # BLD AUTO: 0 K/UL (ref 0–0.5)
EOSINOPHIL NFR BLD: 0.1 % (ref 0–8)
ERYTHROCYTE [DISTWIDTH] IN BLOOD BY AUTOMATED COUNT: 12.7 % (ref 11.5–14.5)
EST. GFR  (AFRICAN AMERICAN): >60 ML/MIN/1.73 M^2
EST. GFR  (AFRICAN AMERICAN): >60 ML/MIN/1.73 M^2
EST. GFR  (NON AFRICAN AMERICAN): >60 ML/MIN/1.73 M^2
EST. GFR  (NON AFRICAN AMERICAN): >60 ML/MIN/1.73 M^2
GLUCOSE SERPL-MCNC: 162 MG/DL (ref 70–110)
GLUCOSE SERPL-MCNC: 183 MG/DL (ref 70–110)
HCT VFR BLD AUTO: 34.1 % (ref 40–54)
HGB BLD-MCNC: 10.8 G/DL (ref 14–18)
IMM GRANULOCYTES # BLD AUTO: 0.06 K/UL (ref 0–0.04)
IMM GRANULOCYTES NFR BLD AUTO: 0.4 % (ref 0–0.5)
LYMPHOCYTES # BLD AUTO: 1.4 K/UL (ref 1–4.8)
LYMPHOCYTES NFR BLD: 9.6 % (ref 18–48)
MAGNESIUM SERPL-MCNC: 1.5 MG/DL (ref 1.6–2.6)
MCH RBC QN AUTO: 27.9 PG (ref 27–31)
MCHC RBC AUTO-ENTMCNC: 31.7 G/DL (ref 32–36)
MCV RBC AUTO: 88 FL (ref 82–98)
MONOCYTES # BLD AUTO: 0.9 K/UL (ref 0.3–1)
MONOCYTES NFR BLD: 6.4 % (ref 4–15)
NEUTROPHILS # BLD AUTO: 11.9 K/UL (ref 1.8–7.7)
NEUTROPHILS NFR BLD: 83.3 % (ref 38–73)
NRBC BLD-RTO: 0 /100 WBC
PHOSPHATE SERPL-MCNC: 2.6 MG/DL (ref 2.7–4.5)
PLATELET # BLD AUTO: 355 K/UL (ref 150–350)
PMV BLD AUTO: 10.4 FL (ref 9.2–12.9)
POCT GLUCOSE: 149 MG/DL (ref 70–110)
POCT GLUCOSE: 156 MG/DL (ref 70–110)
POCT GLUCOSE: 161 MG/DL (ref 70–110)
POCT GLUCOSE: 189 MG/DL (ref 70–110)
POCT GLUCOSE: 198 MG/DL (ref 70–110)
POTASSIUM SERPL-SCNC: 3.2 MMOL/L (ref 3.5–5.1)
POTASSIUM SERPL-SCNC: 3.7 MMOL/L (ref 3.5–5.1)
PROT SERPL-MCNC: 6.9 G/DL (ref 6–8.4)
PROT SERPL-MCNC: 6.9 G/DL (ref 6–8.4)
RBC # BLD AUTO: 3.87 M/UL (ref 4.6–6.2)
SODIUM SERPL-SCNC: 139 MMOL/L (ref 136–145)
SODIUM SERPL-SCNC: 141 MMOL/L (ref 136–145)
WBC # BLD AUTO: 14.29 K/UL (ref 3.9–12.7)

## 2019-07-15 PROCEDURE — 99232 SBSQ HOSP IP/OBS MODERATE 35: CPT | Mod: ,,, | Performed by: INTERNAL MEDICINE

## 2019-07-15 PROCEDURE — 99233 SBSQ HOSP IP/OBS HIGH 50: CPT | Mod: ,,, | Performed by: HOSPITALIST

## 2019-07-15 PROCEDURE — 99233 SBSQ HOSP IP/OBS HIGH 50: CPT | Mod: ,,, | Performed by: PHYSICIAN ASSISTANT

## 2019-07-15 PROCEDURE — 84100 ASSAY OF PHOSPHORUS: CPT

## 2019-07-15 PROCEDURE — 25000003 PHARM REV CODE 250: Performed by: PHYSICIAN ASSISTANT

## 2019-07-15 PROCEDURE — 20600001 HC STEP DOWN PRIVATE ROOM

## 2019-07-15 PROCEDURE — 36415 COLL VENOUS BLD VENIPUNCTURE: CPT

## 2019-07-15 PROCEDURE — 99233 PR SUBSEQUENT HOSPITAL CARE,LEVL III: ICD-10-PCS | Mod: ,,, | Performed by: HOSPITALIST

## 2019-07-15 PROCEDURE — 83735 ASSAY OF MAGNESIUM: CPT

## 2019-07-15 PROCEDURE — 99024 POSTOP FOLLOW-UP VISIT: CPT | Mod: ,,, | Performed by: PODIATRIST

## 2019-07-15 PROCEDURE — 63600175 PHARM REV CODE 636 W HCPCS: Performed by: ANESTHESIOLOGY

## 2019-07-15 PROCEDURE — 93922 UPR/L XTREMITY ART 2 LEVELS: CPT | Performed by: SURGERY

## 2019-07-15 PROCEDURE — 99233 PR SUBSEQUENT HOSPITAL CARE,LEVL III: ICD-10-PCS | Mod: ,,, | Performed by: PHYSICIAN ASSISTANT

## 2019-07-15 PROCEDURE — 99233 SBSQ HOSP IP/OBS HIGH 50: CPT | Mod: 24,,, | Performed by: PODIATRIST

## 2019-07-15 PROCEDURE — 25000003 PHARM REV CODE 250: Performed by: EMERGENCY MEDICINE

## 2019-07-15 PROCEDURE — 99233 PR SUBSEQUENT HOSPITAL CARE,LEVL III: ICD-10-PCS | Mod: 24,,, | Performed by: PODIATRIST

## 2019-07-15 PROCEDURE — 63600175 PHARM REV CODE 636 W HCPCS: Performed by: PHYSICIAN ASSISTANT

## 2019-07-15 PROCEDURE — 85025 COMPLETE CBC W/AUTO DIFF WBC: CPT

## 2019-07-15 PROCEDURE — 80053 COMPREHEN METABOLIC PANEL: CPT | Mod: 91

## 2019-07-15 PROCEDURE — 94761 N-INVAS EAR/PLS OXIMETRY MLT: CPT

## 2019-07-15 PROCEDURE — 25000003 PHARM REV CODE 250: Performed by: HOSPITALIST

## 2019-07-15 PROCEDURE — 63600175 PHARM REV CODE 636 W HCPCS: Performed by: GENERAL ACUTE CARE HOSPITAL

## 2019-07-15 PROCEDURE — 63600175 PHARM REV CODE 636 W HCPCS: Performed by: EMERGENCY MEDICINE

## 2019-07-15 PROCEDURE — 99024 PR POST-OP FOLLOW-UP VISIT: ICD-10-PCS | Mod: ,,, | Performed by: PODIATRIST

## 2019-07-15 PROCEDURE — 63600175 PHARM REV CODE 636 W HCPCS: Performed by: HOSPITALIST

## 2019-07-15 PROCEDURE — 87040 BLOOD CULTURE FOR BACTERIA: CPT | Mod: 59

## 2019-07-15 PROCEDURE — 99232 PR SUBSEQUENT HOSPITAL CARE,LEVL II: ICD-10-PCS | Mod: ,,, | Performed by: INTERNAL MEDICINE

## 2019-07-15 RX ORDER — MAGNESIUM SULFATE HEPTAHYDRATE 40 MG/ML
2 INJECTION, SOLUTION INTRAVENOUS ONCE
Status: COMPLETED | OUTPATIENT
Start: 2019-07-15 | End: 2019-07-15

## 2019-07-15 RX ORDER — POTASSIUM CHLORIDE 7.45 MG/ML
10 INJECTION INTRAVENOUS
Status: COMPLETED | OUTPATIENT
Start: 2019-07-15 | End: 2019-07-15

## 2019-07-15 RX ORDER — INSULIN ASPART 100 [IU]/ML
0-4 INJECTION, SOLUTION INTRAVENOUS; SUBCUTANEOUS EVERY 4 HOURS PRN
Status: DISCONTINUED | OUTPATIENT
Start: 2019-07-15 | End: 2019-07-20

## 2019-07-15 RX ORDER — METOCLOPRAMIDE HYDROCHLORIDE 5 MG/ML
10 INJECTION INTRAMUSCULAR; INTRAVENOUS EVERY 6 HOURS PRN
Status: DISCONTINUED | OUTPATIENT
Start: 2019-07-15 | End: 2019-07-15

## 2019-07-15 RX ORDER — SODIUM CHLORIDE 9 MG/ML
INJECTION, SOLUTION INTRAVENOUS CONTINUOUS
Status: DISCONTINUED | OUTPATIENT
Start: 2019-07-15 | End: 2019-07-24

## 2019-07-15 RX ORDER — METOCLOPRAMIDE HYDROCHLORIDE 5 MG/ML
5 INJECTION INTRAMUSCULAR; INTRAVENOUS EVERY 6 HOURS PRN
Status: DISPENSED | OUTPATIENT
Start: 2019-07-15 | End: 2019-07-17

## 2019-07-15 RX ORDER — METOCLOPRAMIDE HYDROCHLORIDE 5 MG/ML
5 INJECTION INTRAMUSCULAR; INTRAVENOUS EVERY 6 HOURS PRN
Status: DISCONTINUED | OUTPATIENT
Start: 2019-07-15 | End: 2019-07-15

## 2019-07-15 RX ADMIN — ENOXAPARIN SODIUM 40 MG: 100 INJECTION SUBCUTANEOUS at 04:07

## 2019-07-15 RX ADMIN — ATORVASTATIN CALCIUM 80 MG: 20 TABLET, FILM COATED ORAL at 08:07

## 2019-07-15 RX ADMIN — INSULIN ASPART 1 UNITS: 100 INJECTION, SOLUTION INTRAVENOUS; SUBCUTANEOUS at 11:07

## 2019-07-15 RX ADMIN — ONDANSETRON 4 MG: 2 INJECTION INTRAMUSCULAR; INTRAVENOUS at 12:07

## 2019-07-15 RX ADMIN — ONDANSETRON 4 MG: 2 INJECTION INTRAMUSCULAR; INTRAVENOUS at 08:07

## 2019-07-15 RX ADMIN — AMLODIPINE BESYLATE 5 MG: 5 TABLET ORAL at 08:07

## 2019-07-15 RX ADMIN — CEFAZOLIN 6 G: 1 INJECTION, POWDER, FOR SOLUTION INTRAMUSCULAR; INTRAVENOUS at 01:07

## 2019-07-15 RX ADMIN — SODIUM CHLORIDE: 0.9 INJECTION, SOLUTION INTRAVENOUS at 09:07

## 2019-07-15 RX ADMIN — ONDANSETRON 4 MG: 2 INJECTION INTRAMUSCULAR; INTRAVENOUS at 10:07

## 2019-07-15 RX ADMIN — PIPERACILLIN AND TAZOBACTAM 4.5 G: 4; .5 INJECTION, POWDER, LYOPHILIZED, FOR SOLUTION INTRAVENOUS; PARENTERAL at 01:07

## 2019-07-15 RX ADMIN — SODIUM CHLORIDE: 0.9 INJECTION, SOLUTION INTRAVENOUS at 10:07

## 2019-07-15 RX ADMIN — ONDANSETRON 4 MG: 2 INJECTION INTRAMUSCULAR; INTRAVENOUS at 04:07

## 2019-07-15 RX ADMIN — MAGNESIUM SULFATE IN WATER 2 G: 40 INJECTION, SOLUTION INTRAVENOUS at 09:07

## 2019-07-15 RX ADMIN — METOCLOPRAMIDE 5 MG: 5 INJECTION, SOLUTION INTRAMUSCULAR; INTRAVENOUS at 04:07

## 2019-07-15 RX ADMIN — METOCLOPRAMIDE 5 MG: 5 INJECTION, SOLUTION INTRAMUSCULAR; INTRAVENOUS at 09:07

## 2019-07-15 RX ADMIN — SODIUM PHOSPHATE, MONOBASIC, MONOHYDRATE 15 MMOL: 276; 142 INJECTION, SOLUTION INTRAVENOUS at 11:07

## 2019-07-15 RX ADMIN — PIPERACILLIN AND TAZOBACTAM 4.5 G: 4; .5 INJECTION, POWDER, LYOPHILIZED, FOR SOLUTION INTRAVENOUS; PARENTERAL at 09:07

## 2019-07-15 RX ADMIN — POTASSIUM CHLORIDE 10 MEQ: 10 INJECTION, SOLUTION INTRAVENOUS at 03:07

## 2019-07-15 RX ADMIN — POTASSIUM CHLORIDE 10 MEQ: 10 INJECTION, SOLUTION INTRAVENOUS at 06:07

## 2019-07-15 RX ADMIN — SODIUM CHLORIDE 1000 ML: 0.9 INJECTION, SOLUTION INTRAVENOUS at 12:07

## 2019-07-15 RX ADMIN — POTASSIUM CHLORIDE 10 MEQ: 10 INJECTION, SOLUTION INTRAVENOUS at 07:07

## 2019-07-15 RX ADMIN — POTASSIUM CHLORIDE 10 MEQ: 10 INJECTION, SOLUTION INTRAVENOUS at 04:07

## 2019-07-15 NOTE — SUBJECTIVE & OBJECTIVE
Interval History: pt febrile. Still nauseated. No abd pain. Still with leukocytosis. Repeat blood cultures NGTD.     Review of Systems   Constitutional: Positive for chills, diaphoresis and fever.   Respiratory: Negative for cough and shortness of breath.    Cardiovascular: Negative for chest pain and leg swelling.   Gastrointestinal: Positive for nausea and vomiting. Negative for abdominal pain, constipation and diarrhea.   Genitourinary: Negative for dysuria, frequency and hematuria.   Musculoskeletal: Negative for back pain and myalgias.   Skin: Positive for color change and wound (right foot). Negative for rash.   Neurological: Negative for dizziness, light-headedness and headaches.   Psychiatric/Behavioral: Negative for agitation and behavioral problems. The patient is not nervous/anxious.      Objective:     Vital Signs (Most Recent):  Temp: 98.1 °F (36.7 °C) (07/15/19 1139)  Pulse: 99 (07/15/19 1139)  Resp: 18 (07/15/19 1139)  BP: 137/79 (07/15/19 1139)  SpO2: 95 % (07/15/19 1139) Vital Signs (24h Range):  Temp:  [98.1 °F (36.7 °C)-100.9 °F (38.3 °C)] 98.1 °F (36.7 °C)  Pulse:  [] 99  Resp:  [16-20] 18  SpO2:  [95 %-99 %] 95 %  BP: ()/() 137/79     Weight: 89.6 kg (197 lb 8.5 oz)  Body mass index is 26.06 kg/m².    Estimated Creatinine Clearance: 109.7 mL/min (based on SCr of 0.9 mg/dL).    Physical Exam   Constitutional: He is oriented to person, place, and time. He appears well-developed and well-nourished. No distress.   Cardiovascular: Normal rate and regular rhythm.   No murmur heard.  Pulmonary/Chest: Effort normal and breath sounds normal. No respiratory distress.   Abdominal: Soft. Bowel sounds are normal. He exhibits no distension.   Musculoskeletal: Normal range of motion. He exhibits no edema.   Dressing in place to right foot.    Neurological: He is alert and oriented to person, place, and time.   Skin: Skin is warm and dry.   No ascending erythema or warmth   Psychiatric: He has  a normal mood and affect. His behavior is normal.       Significant Labs:   Blood Culture:   Recent Labs   Lab 07/12/19  1044 07/13/19  0358 07/14/19  0629   LABBLOO Gram stain mary bottle: Gram positive cocci in chains resembling Strep  Results called to and read back by:Farrah Ayala RN 07/12/2019    22:47  Gram stain aer bottle: Gram positive cocci in chains resembling Strep   07/13/2019  06:46  STREPTOCOCCUS AGALACTIAE (GROUP B)  Beta-hemolytic streptococci are routinely susceptible to   penicillins,cephalosporins and carbapenems.  For susceptibility see order #[id number]  *  Gram stain mary bottle: Gram positive cocci in chains resembling Strep  Results called to and read back by:Farrah Ayala RN 07/12/2019    22:46  Gram stain aer bottle: Gram positive cocci in chains resembling Strep   07/13/2019  06:43  STREPTOCOCCUS AGALACTIAE (GROUP B)  Beta-hemolytic streptococci are routinely susceptible to   penicillins,cephalosporins and carbapenems.  * No Growth to date  No Growth to date  No Growth to date  No Growth to date  No Growth to date  No Growth to date No Growth to date  No Growth to date     CBC:   Recent Labs   Lab 07/14/19  0326 07/15/19  0305   WBC 13.81* 14.29*   HGB 10.9* 10.8*   HCT 35.4* 34.1*    355*     CMP:   Recent Labs   Lab 07/14/19  0326 07/14/19  1922 07/15/19  0749    139 139   K 3.3* 3.6 3.2*    99 100   CO2 27 27 25   * 155* 162*   BUN 5* 5* 6   CREATININE 0.7 0.8 0.9   CALCIUM 9.0 9.1 8.8   PROT 6.9 7.5 6.9   ALBUMIN 1.9* 2.0* 1.9*   BILITOT 0.3 0.3 0.4   ALKPHOS 112 118 104   AST 10 9* 9*   ALT <5* 5* 5*   ANIONGAP 10 13 14   EGFRNONAA >60.0 >60.0 >60.0     Wound Culture:   Recent Labs   Lab 07/12/19  1257 07/14/19  1728   LABAERO STREPTOCOCCUS AGALACTIAE (GROUP B)  Few  Beta-hemolytic streptococci are routinely susceptible to   penicillins,cephalosporins and carbapenems.  Susceptibility testing not routinely performed  *   STAPHYLOCOCCUS AUREUS  Few  * No growth       Significant Imaging: I have reviewed all pertinent imaging results/findings within the past 24 hours.

## 2019-07-15 NOTE — PLAN OF CARE
met patient and mother to obtain discharge planning assessment.  Podiatry currently at the bedside.  Information obtained from patient's mother, Azucena Ryder.  Patient's mother will provide transportation home.    Lorena Thakur MD       [facility]  [address] [place] [address]  Phone: [phone] Fax: [phone]          07/15/19 1050   Discharge Assessment   Assessment Type Discharge Planning Assessment   Confirmed/corrected address and phone number on facesheet? Yes   Assessment information obtained from? Caregiver  (Patient's mother Azucena Ryder)   Expected Length of Stay (days) 5   Communicated expected length of stay with patient/caregiver no   Prior to hospitilization cognitive status: Alert/Oriented   Prior to hospitalization functional status: Independent   Current cognitive status: Alert/Oriented   Current Functional Status: Independent   Lives With parent(s)  (Azucena Ryder-Mother)   Able to Return to Prior Arrangements yes   Is patient able to care for self after discharge? Unable to determine at this time (comments)   Who are your caregiver(s) and their phone number(s)? Azucena Darden   [phone]   Patient's perception of discharge disposition home or selfcare   Readmission Within the Last 30 Days no previous admission in last 30 days   Patient currently being followed by outpatient case management? No   Patient currently receives any other outside agency services? No   Equipment Currently Used at Home none   Do you have any problems affording any of your prescribed medications? No   Is the patient taking medications as prescribed? yes   Does the patient have transportation home? Yes   Transportation Anticipated family or friend will provide  (Patient's mother will provide transportation home.)   Does the patient receive services at the [facility]? No   Discharge Plan A Home   Discharge Plan B Home with family   DME Needed Upon Discharge     (TBD)   Patient/Family in Agreement with Plan yes

## 2019-07-15 NOTE — PLAN OF CARE
07/15/19 1416   Post-Acute Status   Post-Acute Authorization Placement;Other   Other Status No Post-Acute Service Needs     Patient expected to discharge home with no needs when medically ready.SW will continue to follow up.      Josefina Hammond LMSW  [facility]   [id number]

## 2019-07-15 NOTE — PROGRESS NOTES
Progress Note  Hospital Medicine    Admit Date: 7/12/2019  Length of Stay:  LOS: 3 days     SUBJECTIVE:         Follow-up For:  Acute osteomyelitis    HPI/Interval history (See H&P for complete P,F,SHx) :   Over view  Indio Ryder Jr. is a 49 y.o. male with a PMH of T2DM (poorly  controlled, with long term insulin use) and HTN who presented to the ED on 7/12/2019 diagnosed with  Vomiting (vomiting for past 4 days, denies sick contacts)   Oriented x3 accompanied by daughter at the bedside.  Mentions that he wears lower extremity stockings for edema. Reportedly formed a new ulcer on the left lateral aspect of the foot about 10 days back.  had 4-5 days of nausea and vomiting. Vomiting is non-bloody, No abdominal pain..  It occurs immediately after eating and he has not been able to keep anything down. Associated with fevers and chills but no objective temperature measurement.  At baseline only takes insulin once a day stop taking more than a week because of nausea and vomiting and poor oral intake.  Does not check fingersticks regularly at home. recently lost his insurance and has been unable to follow up with wound care.  His last follow-up with primary care provider and podiatrist was about 2 years.  He has been managing his right foot ulcer at home with dressing but over the past 2 weeks an ulcer on his left foot has developed. He denies any pain or injury- has chronic numbness of bilateral lower extremities and hands from diabetic neuropathy.  Found to be in DKA in the emergency department with beta hydroxybutyrate elevated at 4.1.  Started on insulin drip    07/13/2019   Temperature of 101.5 last night.  Anion gap resolved- bicarbonate 23, insulin drip discontinued.  Started on Levemir 23 units q.a.m. 7 units as part t.i.d. with meals with low-dose sliding scale.  Diabetic diet and NPO from midnight for possible intervention.  MRI of the foot- Ulceration involving the lateral plantar aspect of the distal left  foot, with findings concerning for exposed bone involving the distal aspect of the 5th metatarsal and proximal phalange of the 5th toe.  There are associated changes concerning for osteomyelitis involving the distal aspect of the 5th metatarsal and entirety of the right toe.  There is osseous hyperemia involving the proximal phalange of the 2nd toe, early changes of osteomyelitis are a consideration . aerobic culture  and blood culture with Group B strep. discontinued vancomycin       07/14/2019  Had urinary retention of 800 mL but spontaneously voided.  Started on transitional insulin drip as NPO from midnight for OR today.  Hypokalemia repeat.  Blood cultures daily until clear.  Aerobic cultures with group B Streptococcus and Staph aureus.  Added vancomycin ( monitor for toxicity)    Interval history   s/p I&D and 5th ray amputation in the OR  on  07/14/2019.  ALISSA ordered to evaluate vascular status intraoperative Cultures pending . clean margin cultures and pathology pending. Deescalated from vanc and zosyn to cefazolin 6 gram IV cont infusion.  blood cultures from 07/13/2019 no growth.  Continues to have nausea vomiting since unable to tolerate anything by mouth.  Started on IV hydration and Reglan    Review of Systems: List if applicable  Constitutional: Positive for activity change, appetite change (Poor appetite for the last and), chills, fatigue, fever ( weight loss) and unexpected weight change.   HENT: Negative for congestion, ear discharge, ear pain, facial swelling and sore throat.    Eyes: Negative for pain, discharge and itching.   Respiratory: Positive for shortness of breath ( at rest and exertion ). Negative for cough, chest tightness and wheezing.    Cardiovascular: Positive for leg swelling ( chronic). Negative for chest pain and palpitations.   Gastrointestinal: Positive for vomiting. Negative for abdominal distention, abdominal pain, blood in stool, constipation and diarrhea.   Endocrine:  Negative for cold intolerance.   Genitourinary: Negative for dysuria, hematuria and urgency.   Musculoskeletal: Negative for arthralgias, gait problem, myalgias, neck pain and neck stiffness.   Skin: Negative for color change, pallor and rash.   Allergic/Immunologic: Negative for environmental allergies.   Neurological: Positive for weakness and numbness ( bilateral lower extremities and hands attributes to diabetic neuropathy). Negative for dizziness, syncope, light-headedness and headaches.   Hematological: Negative for adenopathy.   Psychiatric/Behavioral: Negative for agitation, behavioral problems and confusion.     OBJECTIVE:     Vital Signs Range (Last 24H):  Temp:  [97.6 °F (36.4 °C)-100.9 °F (38.3 °C)]   Pulse:  []   Resp:  [16-20]   BP: ()/()   SpO2:  [95 %-99 %]     Physical Exam:  Constitutional: He is oriented to person, place, and time. He appears well-developed and well-nourished.   HENT:   Head: Normocephalic and atraumatic.   Mouth/Throat: Oropharynx is clear and moist. No oropharyngeal exudate.   Eyes: Pupils are equal, round, and reactive to light. Conjunctivae and EOM are normal. Right eye exhibits no discharge. Left eye exhibits no discharge. No scleral icterus.   Neck: Normal range of motion. Neck supple. No JVD present. No tracheal deviation present. No thyromegaly present.   Cardiovascular: Normal rate, regular rhythm and normal heart sounds. Exam reveals no gallop and no friction rub.   No murmur heard.  Pulmonary/Chest: Effort normal and breath sounds normal. No stridor. No respiratory distress. He has no wheezes. He has no rales.   Abdominal: Soft. Bowel sounds are normal. He exhibits no distension and no mass. There is no tenderness. There is no guarding.   Musculoskeletal: Normal range of motion. He exhibits edema.   Lymphadenopathy:     He has no cervical adenopathy.   Neurological: He is oriented to person, place, and time. No cranial nerve deficit.   Able to move  upper and lower extremities without limitation   Skin: Skin is warm and dry.   Left foot: 6cm ulcer with exposed subcutaneous fat and eschar with foul smelling, purulent drainage overlying lateral aspect of left 5th MTP.     Right foot: 1.5cm clean based ulcer at base of right 5th MTP joint. Right great toe and second toe surgically amputated.     Psychiatric: He has a normal mood and affect. His behavior is normal.   Nursing note and vitals reviewed    Medications:  Medication list was reviewed and changes noted under Assessment/Plan.      Current Facility-Administered Medications:     0.9%  NaCl infusion, , Intravenous, Continuous, Vicente Wick MD, Last Rate: 75 mL/hr at 07/15/19 0935    acetaminophen tablet 650 mg, 650 mg, Oral, Q6H PRN, Vicente Wick MD, 650 mg at 07/14/19 2236    amLODIPine tablet 5 mg, 5 mg, Oral, Daily, Vicente Wick MD, 5 mg at 07/15/19 0834    atorvastatin tablet 80 mg, 80 mg, Oral, Daily, Vicente Wick MD, 80 mg at 07/15/19 0834    calcium carbonate 200 mg calcium (500 mg) chewable tablet 1,000 mg, 1,000 mg, Oral, BID PRN, Antonio Tate PA-C, 1,000 mg at 07/14/19 1850    dextrose 10% (D10W) Bolus, 12.5 g, Intravenous, PRN, Vicente Wick MD    dextrose 10% (D10W) Bolus, 25 g, Intravenous, PRN, Vicente Wcik MD    enoxaparin injection 40 mg, 40 mg, Subcutaneous, Daily, Vicente Wick MD, 40 mg at 07/14/19 1850    glucagon (human recombinant) injection 1 mg, 1 mg, Intramuscular, PRN, Roselyn Chow MD    glucose chewable tablet 16 g, 16 g, Oral, PRN, Roselyn Chow MD    glucose chewable tablet 24 g, 24 g, Oral, PRN, Roselyn Chow MD    hydrALAZINE tablet 25 mg, 25 mg, Oral, Q8H PRN, Vicente Wcik MD, 25 mg at 07/14/19 1908    insulin aspart U-100 pen 0-4 Units, 0-4 Units, Subcutaneous, PRN, Roselyn Chow MD    insulin aspart U-100 pen 5 Units, 5 Units, Subcutaneous, TIDWM, Roselyn Chow MD    insulin regular (Humulin R) 100 Units in sodium  chloride 0.9% 100 mL infusion, 0.6 Units/hr, Intravenous, Continuous, Roselyn Chow MD, Last Rate: 0.6 mL/hr at 07/14/19 1626, 0.6 Units/hr at 07/14/19 1626    magnesium sulfate 2g in water 50mL IVPB (premix), 2 g, Intravenous, Once, Vicente Wick MD, 2 g at 07/15/19 0936    metoclopramide HCl injection 5 mg, 5 mg, Intravenous, Q6H PRN, Vicente Wick MD, 5 mg at 07/15/19 0923    ondansetron injection 4 mg, 4 mg, Intravenous, Q6H PRN, Vicente Wick MD, 4 mg at 07/15/19 0832    piperacillin-tazobactam 4.5 g in sodium chloride 0.9% 100 mL IVPB (ready to mix system), 4.5 g, Intravenous, Q8H, Trung Fall MD, Last Rate: 25 mL/hr at 07/15/19 0937, 4.5 g at 07/15/19 0937    sodium chloride 0.9% flush 10 mL, 10 mL, Intravenous, PRN, Vicente Wick MD    sodium chloride 0.9% flush 3 mL, 3 mL, Intravenous, PRN, Jamey Simmons Jr., MD    sodium phosphate 15 mmol in dextrose 5 % 250 mL IVPB, 15 mmol, Intravenous, Once, Vicente Wick MD    vancomycin 1.25 g in dextrose 5% 250 mL IVPB (ready to mix), 15 mg/kg, Intravenous, Q12H, Vicente Wick MD, Last Rate: 166.7 mL/hr at 07/14/19 2238, 1,250 mg at 07/14/19 2238    acetaminophen, calcium carbonate, Dextrose 10% Bolus, Dextrose 10% Bolus, glucagon (human recombinant), glucose, glucose, hydrALAZINE, insulin aspart U-100, metoclopramide HCl, ondansetron, sodium chloride 0.9%, sodium chloride 0.9%    Laboratory/Diagnostic Data:  Reviewed and noted in plan where applicable- Please see chart for full lab data.    Recent Labs   Lab 07/13/19  0358 07/14/19  0326 07/15/19  0305   WBC 12.65 13.81* 14.29*   HGB 11.2* 10.9* 10.8*   HCT 37.8* 35.4* 34.1*    340 355*       Recent Labs   Lab 07/12/19  1037  07/13/19  0358  07/14/19  0326 07/14/19  1922 07/15/19  0305 07/15/19  0749   NA  --    < > 141   < > 139 139  --  139   K  --    < > 4.0   < > 3.3* 3.6  --  3.2*   CL  --    < > 105   < > 102 99  --  100   CO2  --    < > 25   < > 27 27  --  25    BUN  --    < > 9   < > 5* 5*  --  6   CREATININE  --    < > 1.0   < > 0.7 0.8  --  0.9   GLU  --    < > 239*   < > 134* 155*  --  162*   CALCIUM  --    < > 9.0   < > 9.0 9.1  --  8.8   MG 1.7  --  2.0  --  1.7  --  1.5*  --    PHOS  --    < > 3.2  --  2.5*  --  2.6*  --    LIPASE <3*  --   --   --   --   --   --   --     < > = values in this interval not displayed.       Recent Labs   Lab 07/14/19  0326 07/14/19  1922 07/15/19  0749   ALKPHOS 112 118 104   ALT <5* 5* 5*   AST 10 9* 9*   ALBUMIN 1.9* 2.0* 1.9*   PROT 6.9 7.5 6.9   BILITOT 0.3 0.3 0.4        Microbiology labs for the last week  Microbiology Results (last 7 days)     Procedure Component Value Units Date/Time    Blood culture [[id number]] Collected:  07/14/19 0629    Order Status:  Completed Specimen:  Blood Updated:  07/15/19 1012     Blood Culture, Routine No Growth to date      No Growth to date    Aerobic culture [[id number]]  (Abnormal)  (Susceptibility) Collected:  07/12/19 1257    Order Status:  Completed Specimen:  Wound from Foot, Left Updated:  07/15/19 0953     Aerobic Bacterial Culture STREPTOCOCCUS AGALACTIAE (GROUP B)  Few  Beta-hemolytic streptococci are routinely susceptible to   penicillins,cephalosporins and carbapenems.  Susceptibility testing not routinely performed        STAPHYLOCOCCUS AUREUS  Few      Culture, Anaerobe [[id number]] Collected:  07/12/19 1257    Order Status:  Completed Specimen:  Wound from Foot, Left Updated:  07/15/19 0856     Anaerobic Culture Culture in progress    Blood culture [[id number]] Collected:  07/15/19 0305    Order Status:  Sent Specimen:  Blood from Antecubital, Right Updated:  07/15/19 0739    Blood culture [[id number]] Collected:  07/15/19 0305    Order Status:  Sent Specimen:  Blood from Antecubital, Right Updated:  07/15/19 0739    Culture, Anaerobe [[id number]] Collected:  07/14/19 1728    Order Status:  Completed Specimen:  Wound from Toe, Left Foot Updated:  07/15/19 5275     Anaerobic Culture  Culture in progress    Narrative:       Left 5th metatarsal    Aerobic culture [[id number]] Collected:  07/14/19 1728    Order Status:  Completed Specimen:  Wound from Toe, Left Foot Updated:  07/15/19 0719     Aerobic Bacterial Culture No growth    Narrative:       Left 5th metatarsal    Blood culture [[id number]] Collected:  07/13/19 0358    Order Status:  Completed Specimen:  Blood Updated:  07/15/19 0612     Blood Culture, Routine No Growth to date      No Growth to date      No Growth to date    Blood culture [[id number]] Collected:  07/13/19 0358    Order Status:  Completed Specimen:  Blood Updated:  07/15/19 0612     Blood Culture, Routine No Growth to date      No Growth to date      No Growth to date    Blood culture x two cultures. Draw prior to antibiotics. [[id number]]  (Abnormal) Collected:  07/12/19 1044    Order Status:  Completed Specimen:  Blood from Peripheral, Antecubital, Right Updated:  07/14/19 0947     Blood Culture, Routine Gram stain mary bottle: Gram positive cocci in chains resembling Strep      Results called to and read back by:Farrah Ayala RN 07/12/2019        22:47      Gram stain aer bottle: Gram positive cocci in chains resembling Strep       07/13/2019  06:46      STREPTOCOCCUS AGALACTIAE (GROUP B)  Beta-hemolytic streptococci are routinely susceptible to   penicillins,cephalosporins and carbapenems.  For susceptibility see order #[id number]      Narrative:       Aerobic and anaerobic    Blood culture x two cultures. Draw prior to antibiotics. [[id number]]  (Abnormal)  (Susceptibility) Collected:  07/12/19 1044    Order Status:  Completed Specimen:  Blood from Peripheral, Antecubital, Left Updated:  07/14/19 0947     Blood Culture, Routine Gram stain mary bottle: Gram positive cocci in chains resembling Strep      Results called to and read back by:Farrah Ayala RN 07/12/2019        22:46      Gram stain aer bottle: Gram positive cocci in chains resembling Strep       07/13/2019   06:43      STREPTOCOCCUS AGALACTIAE (GROUP B)  Beta-hemolytic streptococci are routinely susceptible to   penicillins,cephalosporins and carbapenems.      Narrative:       Aerobic and anaerobic           Imaging Results          MRI Foot (Forefoot) Left Without Contrast (Final result)  Result time 07/12/19 19:07:38    Final result by Bob Oglesby MD (07/12/19 19:07:38)             Impression:      Ulceration involving the lateral plantar aspect of the distal left foot, with findings concerning for exposed bone involving the distal aspect of the 5th metatarsal and proximal phalange of the 5th toe.  There are associated changes concerning for osteomyelitis involving the distal aspect of the 5th metatarsal and entirety of the right toe.  There is osseous hyperemia involving the proximal phalange of the 2nd toe, early changes of osteomyelitis are a consideration no definite low signal on T1 at this time.      Electronically signed by: Bob Oglesby MD  Date:    07/12/2019  Time:    19:07           Narrative:    EXAMINATION:  MRI FOOT (FOREFOOT) LEFT WITHOUT CONTRAST    CLINICAL HISTORY:  Open wound of ankle, foot and toes;    TECHNIQUE:  Multiplanar multisequence MRI images of the left forefoot were obtained without administration of IV contrast.    COMPARISON:  Radiograph 07/12/2019    FINDINGS:  There is soft tissue ulceration involving the lateral plantar aspect of the right foot, noting there appears to be exposed bone in the location, likely involving the distal aspect of the 5th metatarsal head, and proximal phalange of the 5th toe. There is diffusely increased STIR signal within the distal aspect of the 5th metatarsal, and throughout the phalanges of the 5th toe. There is corresponding low T1 signal involving the same regions, concerning for osteomyelitis. There is additional abnormal STIR signal within the base of the proximal phalange of the 2nd toe, without convincing low signal on T1, suggesting  osseous hyperemia although early changes of osteomyelitis remain a consideration. There is edema and induration about the open wound, no convincing focal organized fluid collection.  The remainder of the osseous structures are grossly unremarkable. No significant joint effusions.  There is reactive edema about the intrinsic musculature of the foot, as well as along the dorsal surface.                              X-Ray Foot Complete Left (Final result)  Result time 07/12/19 11:06:16    Final result by Chandan Montana MD (07/12/19 11:06:16)             Impression:      Abnormal examination with findings strongly suggesting osteomyelitis involving the base of the proximal phalanx of the left 5th toe and likely the head of the 5th metatarsal as well.    This report was flagged in Epic as abnormal.      Electronically signed by: Chandan Montana MD  Date:    07/12/2019  Time:    11:06           Narrative:    EXAMINATION:  XR FOOT COMPLETE 3 VIEW LEFT    TECHNIQUE:  Three views of the left foot were obtained, with AP, lateral, and oblique projections submitted.    COMPARISON:  No relevant comparison examinations are currently available.    FINDINGS:  Focal soft tissue loss/irregularity involving the region of the left 5th MTP joint is observed, with gas present within the soft tissues at this level, the findings likely related to a clinically evident ulceration.  There is focal lytic bone destruction involving the base of the proximal phalanx of the 5th toe, and possibly involving the head of the 5th metatarsal as well, this radiographic appearance strongly suggesting osteomyelitis.  The remaining osseous structures appear intact, with no evidence of recent fracture and no additional areas of focal lytic bone destruction noted.  Some arterial vascular calcification in the soft tissues about the ankle is incidentally observed.                             X-Ray Chest AP Portable (Final result)  Result time 07/12/19 10:48:19     "Final result by Chandan Montana MD (07/12/19 10:48:19)             Impression:      No significant intrathoracic abnormality.  Allowing for differences in projection and a poorer inspiratory depth level on the current examination, there has been no significant detrimental interval change in the appearance of the chest since 05/03/2016.      Electronically signed by: Chandan Montana MD  Date:    07/12/2019  Time:    10:48           Narrative:    EXAMINATION:  XR CHEST AP PORTABLE    CLINICAL HISTORY:  hyperglycemia;    COMPARISON:  Comparison is made to the most recent prior chest radiograph, dated 05/03/2016.    FINDINGS:  Heart size is normal, as is the appearance of the pulmonary vascularity.  Lung zones are clear, and free of significant airspace consolidation or volume loss.  No pleural fluid.  No abnormal mediastinal widening.  No pneumothorax.  Incidental note is made of some spurring at the right acromioclavicular joint level.                              Estimated body mass index is 26.06 kg/m² as calculated from the following:    Height as of this encounter: 6' 1" (1.854 m).    Weight as of this encounter: 89.6 kg (197 lb 8.5 oz).    I & O (Last 24H):    Intake/Output Summary (Last 24 hours) at 7/15/2019 1119  Last data filed at 7/15/2019 0600  Gross per 24 hour   Intake 3071.57 ml   Output 850 ml   Net 2221.57 ml       Estimated Creatinine Clearance: 109.7 mL/min (based on SCr of 0.9 mg/dL).    ASSESSMENT/PLAN:     Active Problems:    Active Diagnoses:     Diagnosis Date Noted POA    PRINCIPAL PROBLEM:  Acute osteomyelitis [M86.10] x-ray left foot - strongly suggesting osteomyelitis involving the base of the proximal phalanx of the left 5th toe and likely the head of the 5th metatarsal as well.        recently lost his insurance and has been unable to follow up with wound care.  His last follow-up with primary care provider and podiatrist was about 2 years.  He has been managing his right foot ulcer at home with " dressing but over the past 2 weeks an ulcer on his left foot has developed. He denies any pain or injury- has chronic numbness of bilateral lower extremities and hands from diabetic neuropathy.  Started on IV Zosyn and vanc.  Podiatry and Infectious Disease consult.  aerobic culture with Group B strep.  Add vancomycin for Staph aureus  (monitor for toxicity)    07/14/2019   s/p I&D and 5th ray amputation in the OR  on  07/14/2019.  No weight-bearing left lower x-ray ALISSA ordered to evaluate vascular status intraoperative Cultures pending . clean margin cultures and pathology pending. Deescalated from vanc and zosyn to cefazolin 6 gram IV cont infusion.  blood cultures from 07/13/2019 no growth.  Continues to have nausea vomiting since unable to tolerate anything by mouth.  Started on IV hydration and Reglan 07/12/2019 Yes    Sepsis [A41.9] /bacteremia - blood cultures from 07/12/2019 group B Streptococcus. secondary to diabetic foot ulcer.  Elevated ESR greater than 120 As above 07/12/2019 Yes    Leukocytosis [D72.829] 14 secondary to sepsis. resolved  07/12/2019 Unknown    Anemia [D64.9] hemoglobin at 12 10.8 with IV hydration-->, normocytic.  Monitor 07/12/2019 Unknown    Type 2 diabetes mellitus with left eye affected by mild nonproliferative retinopathy without macular edema, without long-term current use of insulin [E11.3292]At baseline only takes insulin once a day stopped  taking more than a week because of nausea and vomiting and poor oral intake.  Does not check fingersticks regularly at home. recently lost his insurance.  Obtain HbA1c elevated greater than 14.  Continue insulin drip.  Endocrine evaluation 08/11/2017 Yes       Chronic    Diabetic ketoacidosis without coma associated with type 2 diabetes mellitus [E11.10]  Found to be in DKA in the emergency department with beta hydroxybutyrate elevated at 4.1.  Started on insulin drip.  Received normal saline in the emergency  department    07/13/2019  Anion gap resolved- bicarbonate 23, insulin drip discontinued.  Started on    Started on transitional insulin drip as NPO from midnight. Start Levemir 14 units qhs at  9pm and Novolog 5 units with meals       Hypokalemia-hypomagnesemia replaced    Hypophosphatemia- placed   05/30/2017 Unknown    Type 2 diabetes mellitus with diabetic neuropathy, with long-term current use of insulin [E11.40, Z79.4] 05/03/2016 Not Applicable       Chronic    Mixed hyperlipidemia [E78.2] continue with Lipitor 08/12/2014 Yes    Essential hypertension [I10] blood pressure controlled without medications.  Monitor  06/06/2013 Yes       Chronic               Disposition- home    DVT prophylaxis addressed with:  Brian Wick MD  Attending Staff Physician  Hospital Medicine  pager- [phone] [place] - [phone]

## 2019-07-15 NOTE — ASSESSMENT & PLAN NOTE
-Pt has poorly controlled type 2 DM w/ A1C > 14 due to non-compliance (had insurance issues)  -Intial diagnosis of DKA is Now resolved   -Glucose levels for the past 24hrs ranging between (150 - 170)   -Glucose level goal inpatient (140 -180)   -Pt currently at goal on Regular insulin transition ggt @ 6units/hr   -Pt was NPO for I/D   -Diet started today in the AM      Plan:   -Will switch to MDI as patient has been controlled with Regular insulin ggt @ 6units/hr   -Continue transition insulin drip at 0.6 units/h with stepdown regimen.   -Start Levemir 14 units qhs @ 9pm and DC regular insulin transition ggt   -Resume Novolog 5 units with meals when pt starts eating.   -Low dose correction  -POC AC/HS    Discharge Recommendations: TBD

## 2019-07-15 NOTE — PROGRESS NOTES
[facility]  Infectious Disease  Progress Note    Patient Name: Indio Ryder Jr.  MRN: [id number]  Admission Date: 7/12/2019  Length of Stay: 3 days  Attending Physician: Vicente Wick MD  Primary Care Provider: Lorena Thakur MD    Isolation Status: No active isolations  Assessment/Plan:      Bacteremia due to group B Streptococcus  Admitted in DKA.  Blood cultures positive for GBS. Wound cultures also with GBS and MSSA. Per podiatry note, there is necrosis, purulence tracking down to bone. Pt is s/p I&D and 5th ray amputation in the OR 7/14.  - Tmax- 100.9. Leukocytosis of 14K  - clean margin cultures and pathology pending.     Plan:  1. De-escalate from vanc and zosyn to cefazolin 6 gram IV cont infusion (for group B strep and MSSA)  2. F/u on repeat blood cultures  3. Monitor fever curve, WBC count  4. Recommend ABIs to assess vascular status  5. Follow podiatry plans  6. Will follow      Thank you for your consult. I will follow-up with patient. Please contact us if you have any additional questions.  RAMILA Rick Pager: [phone]  Infectious Disease  [facility]    Subjective:     Principal Problem:Acute osteomyelitis    HPI: This is a 49 year old male with a PMH of DM (poorly controlled, with long term insulin use) and HTN who presented to the ED with nausea, vomiting, and foot ulcer. He reports 4-5 days of nausea and vomiting. Denies abdominal pain.  he also has associated chills but has not taken his temperature.  He has been managing his right foot ulcer at home with dressing but over the past 2 weeks an ulcer on his left foot has developed. He denies any pain or injury- has chronic numbness of bilateral lower extremities and hands from diabetic neuropathy.  Found to be in DKA in the emergency department and started on insulin drip. Per podiatry, the wound was necrotic with fluctuance and purulence down to bone. Patient febrile to 101.4 with leukocytosis of  14,000. His blood cultures now positive for group B strep. Wound culture also with GBS. Pt on empiric vancomycin and zosyn. ID consulted for recs.     Patient reports he still is feeling bad. No more vomiting. Family at bedside.     Interval History: pt febrile. Still nauseated. No abd pain. Still with leukocytosis. Repeat blood cultures NGTD.     Review of Systems   Constitutional: Positive for chills, diaphoresis and fever.   Respiratory: Negative for cough and shortness of breath.    Cardiovascular: Negative for chest pain and leg swelling.   Gastrointestinal: Positive for nausea and vomiting. Negative for abdominal pain, constipation and diarrhea.   Genitourinary: Negative for dysuria, frequency and hematuria.   Musculoskeletal: Negative for back pain and myalgias.   Skin: Positive for color change and wound (right foot). Negative for rash.   Neurological: Negative for dizziness, light-headedness and headaches.   Psychiatric/Behavioral: Negative for agitation and behavioral problems. The patient is not nervous/anxious.      Objective:     Vital Signs (Most Recent):  Temp: 98.1 °F (36.7 °C) (07/15/19 1139)  Pulse: 99 (07/15/19 1139)  Resp: 18 (07/15/19 1139)  BP: 137/79 (07/15/19 1139)  SpO2: 95 % (07/15/19 1139) Vital Signs (24h Range):  Temp:  [98.1 °F (36.7 °C)-100.9 °F (38.3 °C)] 98.1 °F (36.7 °C)  Pulse:  [] 99  Resp:  [16-20] 18  SpO2:  [95 %-99 %] 95 %  BP: ()/() 137/79     Weight: 89.6 kg (197 lb 8.5 oz)  Body mass index is 26.06 kg/m².    Estimated Creatinine Clearance: 109.7 mL/min (based on SCr of 0.9 mg/dL).    Physical Exam   Constitutional: He is oriented to person, place, and time. He appears well-developed and well-nourished. No distress.   Cardiovascular: Normal rate and regular rhythm.   No murmur heard.  Pulmonary/Chest: Effort normal and breath sounds normal. No respiratory distress.   Abdominal: Soft. Bowel sounds are normal. He exhibits no distension.   Musculoskeletal:  Normal range of motion. He exhibits no edema.   Dressing in place to right foot.    Neurological: He is alert and oriented to person, place, and time.   Skin: Skin is warm and dry.   No ascending erythema or warmth   Psychiatric: He has a normal mood and affect. His behavior is normal.       Significant Labs:   Blood Culture:   Recent Labs   Lab 07/12/19  1044 07/13/19  0358 07/14/19  0629   LABBLOO Gram stain mary bottle: Gram positive cocci in chains resembling Strep  Results called to and read back by:Farrah Ayala RN 07/12/2019    22:47  Gram stain aer bottle: Gram positive cocci in chains resembling Strep   07/13/2019  06:46  STREPTOCOCCUS AGALACTIAE (GROUP B)  Beta-hemolytic streptococci are routinely susceptible to   penicillins,cephalosporins and carbapenems.  For susceptibility see order #[id number]  *  Gram stain mary bottle: Gram positive cocci in chains resembling Strep  Results called to and read back by:Farrah Ayala RN 07/12/2019    22:46  Gram stain aer bottle: Gram positive cocci in chains resembling Strep   07/13/2019  06:43  STREPTOCOCCUS AGALACTIAE (GROUP B)  Beta-hemolytic streptococci are routinely susceptible to   penicillins,cephalosporins and carbapenems.  * No Growth to date  No Growth to date  No Growth to date  No Growth to date  No Growth to date  No Growth to date No Growth to date  No Growth to date     CBC:   Recent Labs   Lab 07/14/19  0326 07/15/19  0305   WBC 13.81* 14.29*   HGB 10.9* 10.8*   HCT 35.4* 34.1*    355*     CMP:   Recent Labs   Lab 07/14/19  0326 07/14/19  1922 07/15/19  0749    139 139   K 3.3* 3.6 3.2*    99 100   CO2 27 27 25   * 155* 162*   BUN 5* 5* 6   CREATININE 0.7 0.8 0.9   CALCIUM 9.0 9.1 8.8   PROT 6.9 7.5 6.9   ALBUMIN 1.9* 2.0* 1.9*   BILITOT 0.3 0.3 0.4   ALKPHOS 112 118 104   AST 10 9* 9*   ALT <5* 5* 5*   ANIONGAP 10 13 14   EGFRNONAA >60.0 >60.0 >60.0     Wound Culture:   Recent Labs   Lab  07/12/19  1257 07/14/19  1728   LABAERO STREPTOCOCCUS AGALACTIAE (GROUP B)  Few  Beta-hemolytic streptococci are routinely susceptible to   penicillins,cephalosporins and carbapenems.  Susceptibility testing not routinely performed  *  STAPHYLOCOCCUS AUREUS  Few  * No growth       Significant Imaging: I have reviewed all pertinent imaging results/findings within the past 24 hours.

## 2019-07-15 NOTE — CARE UPDATE
Rapid Response Nurse Chart Check     Chart check completed, abnormal VS noted, bedside RNMarkos contacted, no concerns   verbalized at this time, instructed to call [phone] for further concerns or assistance.

## 2019-07-15 NOTE — PROGRESS NOTES
Therapy with vancomycin d/c'ed by ID team.  Pharmacy will sign off, please re-consult as needed.    Thank you for the consult,    Veronique Anderson, PharmD  [phone] or [phone]

## 2019-07-15 NOTE — PLAN OF CARE
Problem: Adult Inpatient Plan of Care  Goal: Plan of Care Review  Outcome: Ongoing (interventions implemented as appropriate)  Rested comfortably most of night, nad. Temp and hr were both elevated at beginning of shift. Pt nauseated and vomited x2 at beginning of shift. All resolved by mid morning. No c/o pain. IV abx given. B feet elevated and dsg's cdi.

## 2019-07-15 NOTE — CONSULTS
PharmD Consult received for:  Renally adjust all medications    No renal adjustments required.  Pharmacy will sign off of renal dosing consult and will continue to monitor per protocol    Thank you for the consult,    Rc PowellD., BCPS  [phone]      **Note: Consults are reviewed Monday-Friday 7:00am-3:30pm. The above recommendations are only suggested. The recommendations should be considered in conjunction with all patient factors.**

## 2019-07-15 NOTE — PLAN OF CARE
Problem: Adult Inpatient Plan of Care  Goal: Plan of Care Review  Outcome: Ongoing (interventions implemented as appropriate)     07/15/19 7112   Plan of Care Review   Plan of Care Reviewed With patient;family   Progress declining   POC reviewed with patient and family. VSS. Pt had multiple episodes of nausea and vomiting today. PRN nausea medications given with only mild relief. Pt not able to tolerate food. Primary team aware. IV antibiotics started and multiple infusions given today. Pt currently resting in bed. Call light within reach. [facility].

## 2019-07-15 NOTE — SUBJECTIVE & OBJECTIVE
Interval Hx:  S/P Debridement left foot wound (DOS: 7/14 per Dr. Hickman).  Patient Seen at bedside for dressing change.  Patient denies F/C/N/V.  Pain controlled.  Dressings clean, dry, and intact.  Denies pain in calves.    Scheduled Meds:   amLODIPine  5 mg Oral Daily    atorvastatin  80 mg Oral Daily    ceFAZolin(ANCEF) in D5W 500 mL CONTINUOUS INFUSION  6 g Intravenous Q24H    enoxaparin  40 mg Subcutaneous Daily    insulin aspart U-100  5 Units Subcutaneous TIDWM    potassium chloride in water  10 mEq Intravenous Q1H    sodium phosphate IVPB  15 mmol Intravenous Once     Continuous Infusions:   sodium chloride 0.9% 75 mL/hr at 07/15/19 0935    insulin (HUMAN R) infusion (adults) 0.6 Units/hr (07/14/19 1626)     PRN Meds:acetaminophen, calcium carbonate, Dextrose 10% Bolus, Dextrose 10% Bolus, glucagon (human recombinant), glucose, glucose, hydrALAZINE, insulin aspart U-100, metoclopramide HCl, ondansetron, sodium chloride 0.9%, sodium chloride 0.9%    Review of patient's allergies indicates:  No Known Allergies     Past Medical History:   Diagnosis Date    Actinomyces infection 1/17/2017    Right foot    Diabetic ketoacidosis without coma associated with type 2 diabetes mellitus 5/30/2017    Diabetic ulcer of right foot associated with type 2 diabetes mellitus 6/3/2015    Essential hypertension 6/6/2013    Group B streptococcal infection 1/13/2017    Mixed hyperlipidemia 8/12/2014    Shoulder impingement 8/12/2014    Subacute osteomyelitis of right foot 1/12/2017    Streptococcus agalactiae, Actinomyces odontolyticus    Traumatic amputation of fifth toe of right foot 7/2/2015    Type 2 diabetes mellitus with diabetic neuropathy, with long-term current use of insulin 5/3/2016    Ulcerative colitis, unspecified      Past Surgical History:   Procedure Laterality Date    AMPUTATION-TOE Right 6/5/2015    [facility]    AMPUTATION-TOE and Tendo achilles  lengthening Right 1/19/2017    Performed by Ramirez Wilkes DPM at [facility] OR    DEBRIDEMENT, FOOT, with left 5th ray amputation Left 7/14/2019    Performed by Sis [facility]ickman D[facility] at [facility] OR 2ND FLR    INCISION AND DRAINAGE (I&D), FOOT Right 1/16/2017    Performed by Ramirez Wilkes DPM at [facility] OR    IRRIGATION AND DEBRIDEMENT of right foot ulcer Right 7/14/2019    Performed by Sis Hickman DPM at [facility] OR 2ND FLR    RESECTION-METATARSAL HEAD Right 1/16/2017    Performed by Ramirez Wilkes D[facility] at [facility] OR    TOE AMPUTATION Right 06/05/2015    TOE AMPUTATION Right 01/19/2017       Family History     Problem Relation (Age of Onset)    Cancer Mother    Cataracts Father    Diabetes Mother, Sister    Hypertension Mother, Father, Maternal Aunt    No Known Problems Brother, Sister, Sister, Maternal Uncle, Paternal Aunt, Paternal Uncle, Maternal Grandmother, Maternal Grandfather, Paternal Grandmother, Paternal Grandfather        Tobacco Use    Smoking status: Never Smoker    Smokeless tobacco: Never Used   Substance and Sexual Activity    Alcohol use: No    Drug use: No    Sexual activity: Yes     Partners: Female     Birth control/protection: Condom     Review of Systems   Constitutional: Negative for appetite change, chills, fatigue and fever.   HENT: Negative for congestion, facial swelling, sore throat and trouble swallowing.    Eyes: Negative for photophobia and visual disturbance.   Respiratory: Negative for cough, chest tightness and shortness of breath.    Cardiovascular: Positive for leg swelling. Negative for chest pain and palpitations.   Gastrointestinal: Negative for abdominal pain, constipation, diarrhea, nausea and vomiting.   Endocrine: Negative for polydipsia, polyphagia and polyuria.   Genitourinary: Negative for difficulty urinating and flank pain.   Musculoskeletal: Negative for arthralgias.   Skin: Positive for color change and wound.   Allergic/Immunologic: Negative.    Neurological:  Positive for weakness and numbness. Negative for dizziness.   Hematological: Negative for adenopathy. Does not bruise/bleed easily.   Psychiatric/Behavioral: Negative for agitation, behavioral problems, confusion and hallucinations.     Objective:     Vital Signs (Most Recent):  Temp: 98.1 °F (36.7 °C) (07/15/19 1139)  Pulse: 99 (07/15/19 1139)  Resp: 18 (07/15/19 1139)  BP: 137/79 (07/15/19 1139)  SpO2: (!) 92 % (07/15/19 1139) Vital Signs (24h Range):  Temp:  [98.1 °F (36.7 °C)-100.9 °F (38.3 °C)] 98.1 °F (36.7 °C)  Pulse:  [] 99  Resp:  [16-20] 18  SpO2:  [92 %-99 %] 92 %  BP: ()/() 137/79     Weight: 89.6 kg (197 lb 8.5 oz)  Body mass index is 26.06 kg/m².    Foot Exam    General  Orientation: alert and oriented to person, place, and time       Right Foot/Ankle     Inspection and Palpation  Ecchymosis: none  Tenderness: none   Swelling: dorsum   Skin Exam: ulcer; (plantar aspect of 4th MTP joint; Granular wound base; Negative probe to bone. No drainage or malodor noted. No eryhthema)    Neurovascular  Dorsalis pedis: 1+  Posterior tibial: 1+  Saphenous nerve sensation: absent  Tibial nerve sensation: absent  Superficial peroneal nerve sensation: absent  Deep peroneal nerve sensation: absent  Sural nerve sensation: absent      Left Foot/Ankle      Inspection and Palpation  Ecchymosis: none  Tenderness: none   Swelling: dorsum   Skin Exam: drainage and ulcer; (ulcer noted at the lateral plantar aspect of 5th met head with purulent drainage, positive probe to bone, tracking distally to 5th digit and medially 2cm. Liquefying necrosis of fat, subQ, fascia to level of tendon noted.)    Neurovascular  Dorsalis pedis: 1+  Posterior tibial: 1+            Laboratory:  A1C:   Recent Labs   Lab 07/12/19  1023   HGBA1C >14.0*     CBC:   Recent Labs   Lab 07/15/19  0305   WBC 14.29*   RBC 3.87*   HGB 10.8*   HCT 34.1*   *   MCV 88   MCH 27.9   MCHC 31.7*     CMP:   Recent Labs   Lab 07/15/19  0776    *   CALCIUM 8.8   ALBUMIN 1.9*   PROT 6.9      K 3.2*   CO2 25      BUN 6   CREATININE 0.9   ALKPHOS 104   ALT 5*   AST 9*   BILITOT 0.4     CRP:   Recent Labs   Lab 07/12/19  1037   CRP 95.8*     ESR:   Recent Labs   Lab 07/12/19  1037   SEDRATE >120*     Wound Cultures:   Recent Labs   Lab 07/12/19  1257 07/14/19  1728   LABAERO STREPTOCOCCUS AGALACTIAE (GROUP B)  Few  Beta-hemolytic streptococci are routinely susceptible to   penicillins,cephalosporins and carbapenems.  Susceptibility testing not routinely performed  *  STAPHYLOCOCCUS AUREUS  Few  * No growth     Microbiology Results (last 7 days)     Procedure Component Value Units Date/Time    Blood culture [[id number]] Collected:  07/14/19 0629    Order Status:  Completed Specimen:  Blood Updated:  07/15/19 1012     Blood Culture, Routine No Growth to date      No Growth to date    Aerobic culture [[id number]]  (Abnormal)  (Susceptibility) Collected:  07/12/19 1257    Order Status:  Completed Specimen:  Wound from Foot, Left Updated:  07/15/19 0953     Aerobic Bacterial Culture STREPTOCOCCUS AGALACTIAE (GROUP B)  Few  Beta-hemolytic streptococci are routinely susceptible to   penicillins,cephalosporins and carbapenems.  Susceptibility testing not routinely performed        STAPHYLOCOCCUS AUREUS  Few      Culture, Anaerobe [[id number]] Collected:  07/12/19 1257    Order Status:  Completed Specimen:  Wound from Foot, Left Updated:  07/15/19 0856     Anaerobic Culture Culture in progress    Blood culture [[id number]] Collected:  07/15/19 0305    Order Status:  Sent Specimen:  Blood from Antecubital, Right Updated:  07/15/19 0739    Blood culture [[id number]] Collected:  07/15/19 0305    Order Status:  Sent Specimen:  Blood from Antecubital, Right Updated:  07/15/19 0739    Culture, Anaerobe [[id number]] Collected:  07/14/19 1728    Order Status:  Completed Specimen:  Wound from Toe, Left Foot Updated:  07/15/19 0727     Anaerobic Culture Culture  in progress    Narrative:       Left 5th metatarsal    Aerobic culture [[id number]] Collected:  07/14/19 1728    Order Status:  Completed Specimen:  Wound from Toe, Left Foot Updated:  07/15/19 0719     Aerobic Bacterial Culture No growth    Narrative:       Left 5th metatarsal    Blood culture [[id number]] Collected:  07/13/19 0358    Order Status:  Completed Specimen:  Blood Updated:  07/15/19 0612     Blood Culture, Routine No Growth to date      No Growth to date      No Growth to date    Blood culture [[id number]] Collected:  07/13/19 0358    Order Status:  Completed Specimen:  Blood Updated:  07/15/19 0612     Blood Culture, Routine No Growth to date      No Growth to date      No Growth to date    Blood culture x two cultures. Draw prior to antibiotics. [[id number]]  (Abnormal) Collected:  07/12/19 1044    Order Status:  Completed Specimen:  Blood from Peripheral, Antecubital, Right Updated:  07/14/19 0947     Blood Culture, Routine Gram stain mary bottle: Gram positive cocci in chains resembling Strep      Results called to and read back by:Farrah Ayala RN 07/12/2019        22:47      Gram stain aer bottle: Gram positive cocci in chains resembling Strep       07/13/2019  06:46      STREPTOCOCCUS AGALACTIAE (GROUP B)  Beta-hemolytic streptococci are routinely susceptible to   penicillins,cephalosporins and carbapenems.  For susceptibility see order #[id number]      Narrative:       Aerobic and anaerobic    Blood culture x two cultures. Draw prior to antibiotics. [[id number]]  (Abnormal)  (Susceptibility) Collected:  07/12/19 1044    Order Status:  Completed Specimen:  Blood from Peripheral, Antecubital, Left Updated:  07/14/19 0947     Blood Culture, Routine Gram stain mary bottle: Gram positive cocci in chains resembling Strep      Results called to and read back by:Farrah Ayala RN 07/12/2019        22:46      Gram stain aer bottle: Gram positive cocci in chains resembling Strep       07/13/2019  06:43       STREPTOCOCCUS AGALACTIAE (GROUP B)  Beta-hemolytic streptococci are routinely susceptible to   penicillins,cephalosporins and carbapenems.      Narrative:       Aerobic and anaerobic        Specimen (12h ago, onward)    None          Diagnostic Results:  X-Ray: I have reviewed all pertinent results/findings within the past 24 hours.     Xrays of the left foot show osteomyelitis of the 5th ray with focal lytic bone destruction involving the base of the proximal phalanx of the 5th toe and the head of the 5th metatarsal.      Clinical Findings:    Ulcer Location: left 5th mtpj  Measurements:7x1x0.4cm  Periwound: viable, macerated  Drainage: serosanguinous.  Base:  100% granular. 0% fibrin.   5th metatarsal head is spongy to feel.      7/15          7/12        7/12 Ulcer prior to bedside debridement in the ED        7/12 Right foot ulcer at plantar 4th MTP joint. Granular, superficial wound base. Does not probe to bone. No purulence or active drainage, No edema, no erythema. No fluctuance or crepitus noted. Stable wound.

## 2019-07-15 NOTE — ASSESSMENT & PLAN NOTE
- due to OM of L foot, management per primary  - Tight glucose control with Glucose levels of 140 -180 while inpatient   -Accuchecks qAC and HS

## 2019-07-15 NOTE — PROGRESS NOTES
"[facility]  Endocrinology  Progress Note    Admit Date: 7/12/2019     Reason for Consult: Management of T2DM, Hyperglycemia, DKA    Surgical Procedure and Date: 7/14/19    Diabetes diagnosis year: >20 years, 1990s?    Home Diabetes Medications:  Metformin 500mg BID, Lantus 26U QHS    How often checking glucose at home? None  BG readings on regimen: n/a  Hypoglycemia on the regimen?  No  Missed doses on regimen?  No    Diabetes Complications include:     Hyperglycemia and Foot ulcer      Complicating diabetes co morbidities:  HTN      HPI:   Patient is a 49 y.o. male with a diagnosis of Type 2 DM (noncomplaince, skip Levemir 1-2x a week), HTN, PVD, hx of right foot osteomyelitis who was admitted to hospital medicine for DKA and L foot ulcer. He report  4-5 days of nausea and vomiting. Vomiting is non-bloody. He has not been able to keep anything down. Pt hasn't been administering his insulin during this time due to the fact that he has not been able to keep anything down. He was found to have a large open wound on L foot, pt recently lost his insurance and has been unable to follow up with wound care. Xray showed new Osteomyelitis of L foot.  Pt report skipping his Lantus at home 1-2x a week, not on insulin with meal. Was on Trulicity but stopped due to lost of insurance. BHOB was 4.1, A1C >14, Anion gap of 21, glucose 349. Endocrine was consulted for "DKA", diabetes management.         Interval HPI:   Pt on regular insulin transition ggt @ 0.6units/hr with glucose levels ranging between 150- 170's.  Pt still NPO   Overnight events:  No overnight events   Eating:   NPO  Nausea: No  Hypoglycemia and intervention: No  Fever: No  TPN and/or TF: No    BP (!) 162/98 (BP Location: Right arm, Patient Position: Lying)   Pulse 95   Temp 98.9 °F (37.2 °C) (Oral)   Resp 17   Ht 6' 1" (1.854 m)   Wt 89.6 kg (197 lb 8.5 oz)   SpO2 98%   BMI 26.06 kg/m²      Labs Reviewed and Include    Recent Labs   Lab " 07/15/19  0749   *   CALCIUM 8.8   ALBUMIN 1.9*   PROT 6.9      K 3.2*   CO2 25      BUN 6   CREATININE 0.9   ALKPHOS 104   ALT 5*   AST 9*   BILITOT 0.4     Lab Results   Component Value Date    WBC 14.29 (H) 07/15/2019    HGB 10.8 (L) 07/15/2019    HCT 34.1 (L) 07/15/2019    MCV 88 07/15/2019     (H) 07/15/2019     No results for input(s): TSH, FREET4 in the last 168 hours.  Lab Results   Component Value Date    HGBA1C >14.0 (H) 07/12/2019       Nutritional status:   Body mass index is 26.06 kg/m².  Lab Results   Component Value Date    ALBUMIN 1.9 (L) 07/15/2019    ALBUMIN 2.0 (L) 07/14/2019    ALBUMIN 1.9 (L) 07/14/2019     No results found for: PREALBUMIN    Estimated Creatinine Clearance: 109.7 mL/min (based on SCr of 0.9 mg/dL).    Accu-Checks  Recent Labs     07/13/19  2159 07/13/19  2357 07/14/19  0624 07/14/19  0741 07/14/19  1129 07/14/19  1545 07/14/19  1747 07/14/19  2109 07/15/19  0533 07/15/19  0820   POCTGLUCOSE 78 113* 152* 163* 153* 167* 171* 151* 149* 161*       Current Medications and/or Treatments Impacting Glycemic Control  Immunotherapy:    Immunosuppressants     None        Steroids:   Hormones (From admission, onward)    None        Pressors:    Autonomic Drugs (From admission, onward)    None        Hyperglycemia/Diabetes Medications:   Antihyperglycemics (From admission, onward)    Start     Stop Route Frequency Ordered    07/14/19 0715  insulin aspart U-100 pen 5 Units      -- SubQ 3 times daily with meals 07/13/19 2212 07/13/19 2045  insulin regular (Humulin R) 100 Units in sodium chloride 0.9% 100 mL infusion      -- IV Continuous 07/13/19 1937 07/13/19 2037  insulin aspart U-100 pen 0-4 Units      -- SubQ As needed (PRN) 07/13/19 1937          ASSESSMENT and PLAN    Type 2 diabetes mellitus with hyperglycemia, with long-term current use of insulin  -Pt has poorly controlled type 2 DM w/ A1C > 14 due to non-compliance (had insurance issues)  -Intial  diagnosis of DKA is Now resolved   -Glucose levels for the past 24hrs ranging between (150 - 170)   -Glucose level goal inpatient (140 -180)   -Pt currently at goal on Regular insulin transition ggt @ 6units/hr   -Pt was NPO for I/D   -Diet started today in the AM  But did not tolerate PO 2/2 nausea     Plan:   -Continue transition insulin drip at 0.6 units/h with stepdown regimen.   -Low dose correction q 4hrs prn   -Accuchecks q 4hrs   -When patient tolerates PO diet, will switch to MDI     Discharge Recommendations: TBD        Sepsis  - due to OM of L foot, management per primary  - Tight glucose control with Glucose levels of 140 -180 while inpatient   -Accuchecks qAC and HS       Diabetic ketoacidosis without coma associated with type 2 diabetes mellitus  - DKA on admission due to noncompliance of insulin use combine with Osteomyelitis of foot  - Now resolved        Nakul Patel MD  Endocrinology  [facility]

## 2019-07-15 NOTE — CARE UPDATE
"RAPID RESPONSE NURSE PROACTIVE ROUNDING NOTE     Time of Visit: 0235    Admit Date: 2019  LOS: 3  Code Status: Full Code   Date of Visit: 07/15/2019  : 1970  Age: 49 y.o.  Sex: male  Race: Black or   Bed: 8093/8093 A:   MRN: [id number]  Was the patient discharged from an ICU this admission? no   Was the patient discharged from a PACU within last 24 hours?  yes  Did the patient receive conscious sedation/general anesthesia in last 24 hours?  yes  Was the patient in the ED within the past 24 hours?  no  Was the patient started on NIPPV within the past 24 hours?  no  Attending Physician: Vicente Wick MD  Primary Service: [facility] MED B    ASSESSMENT     Diagnosis: Acute osteomyelitis    Abnormal Vital Signs: /75 (BP Location: Right arm, Patient Position: Lying)   Pulse 104   Temp 99.5 °F (37.5 °C) (Oral)   Resp 18   Ht 6' 1" (1.854 m)   Wt 89.6 kg (197 lb 8.5 oz)   SpO2 95%   BMI 26.06 kg/m²      Clinical Issues: Dysrythmia    Patient  has a past medical history of Actinomyces infection, Diabetic ketoacidosis without coma associated with type 2 diabetes mellitus, Diabetic ulcer of right foot associated with type 2 diabetes mellitus, Essential hypertension, Group B streptococcal infection, Mixed hyperlipidemia, Shoulder impingement, Subacute osteomyelitis of right foot, Traumatic amputation of fifth toe of right foot, Type 2 diabetes mellitus with diabetic neuropathy, with long-term current use of insulin, and Ulcerative colitis, unspecified.    Pt presented with sepsis r/t osteomyletis of bilateral LE. S/P ID of both feet on . Abx coverage in place.     On assessment pt sleeping in bed, denies complaints. VSS as above. Fever improved following prn anti-pyretic. Tachycardia improved s/p ordered NS bolus.     INTERVENTIONS/ RECOMMENDATIONS     Continue current POC.    Discussed plan of care with STONE ECHOLS.    PHYSICIAN ESCALATION     Yes/No  no    Orders received and case " discussed with NA.    Disposition: Remain in room 3221.    FOLLOW-UP     Call back the Rapid Response Nurse, Christopher Pike RN at [phone] for additional questions or concerns.

## 2019-07-15 NOTE — PROGRESS NOTES
[facility]  Podiatry  Progress Note    Patient Name: Indio Ryder Jr.  MRN: [id number]  Admission Date: 7/12/2019  Hospital Length of Stay: 3 days  Attending Physician: Vicente Wick MD  Primary Care Provider: Lorena Thakur MD   Interval Hx:  S/P Debridement left foot wound (DOS: 7/14 per Dr. Hickman).  Patient Seen at bedside for dressing change.  Patient denies F/C/N/V.  Pain controlled.  Dressings clean, dry, and intact.  Denies pain in calves.    Scheduled Meds:   amLODIPine  5 mg Oral Daily    atorvastatin  80 mg Oral Daily    ceFAZolin(ANCEF) in D5W 500 mL CONTINUOUS INFUSION  6 g Intravenous Q24H    enoxaparin  40 mg Subcutaneous Daily    insulin aspart U-100  5 Units Subcutaneous TIDWM    potassium chloride in water  10 mEq Intravenous Q1H    sodium phosphate IVPB  15 mmol Intravenous Once     Continuous Infusions:   sodium chloride 0.9% 75 mL/hr at 07/15/19 0935    insulin (HUMAN R) infusion (adults) 0.6 Units/hr (07/14/19 1626)     PRN Meds:acetaminophen, calcium carbonate, Dextrose 10% Bolus, Dextrose 10% Bolus, glucagon (human recombinant), glucose, glucose, hydrALAZINE, insulin aspart U-100, metoclopramide HCl, ondansetron, sodium chloride 0.9%, sodium chloride 0.9%    Review of patient's allergies indicates:  No Known Allergies     Past Medical History:   Diagnosis Date    Actinomyces infection 1/17/2017    Right foot    Diabetic ketoacidosis without coma associated with type 2 diabetes mellitus 5/30/2017    Diabetic ulcer of right foot associated with type 2 diabetes mellitus 6/3/2015    Essential hypertension 6/6/2013    Group B streptococcal infection 1/13/2017    Mixed hyperlipidemia 8/12/2014    Shoulder impingement 8/12/2014    Subacute osteomyelitis of right foot 1/12/2017    Streptococcus agalactiae, Actinomyces odontolyticus    Traumatic amputation of fifth toe of right foot 7/2/2015    Type 2 diabetes mellitus with diabetic neuropathy, with  long-term current use of insulin 5/3/2016    Ulcerative colitis, unspecified      Past Surgical History:   Procedure Laterality Date    AMPUTATION-TOE Right 6/5/2015    Performed by William Manuel DPM at [facility] OR    AMPUTATION-TOE and Tendo achilles lengthening Right 1/19/2017    Performed by Ramirez Wilkes DPM at [facility] OR    DEBRIDEMENT, FOOT, with left 5th ray amputation Left 7/14/2019    Performed by Sis Hickman DPM at [facility] OR 2ND FLR    INCISION AND DRAINAGE (I&D), FOOT Right 1/16/2017    Performed by Ramirez Wilkes DPM at [facility] OR    IRRIGATION AND DEBRIDEMENT of right foot ulcer Right 7/14/2019    Performed by Sis Hickman DPM at [facility] OR 2ND FLR    RESECTION-METATARSAL HEAD Right 1/16/2017    Performed by Ramirez Wilkes DPM at [facility] OR    TOE AMPUTATION Right 06/05/2015    TOE AMPUTATION Right 01/19/2017       Family History     Problem Relation (Age of Onset)    Cancer Mother    Cataracts Father    Diabetes Mother, Sister    Hypertension Mother, Father, Maternal Aunt    No Known Problems Brother, Sister, Sister, Maternal Uncle, Paternal Aunt, Paternal Uncle, Maternal Grandmother, Maternal Grandfather, Paternal Grandmother, Paternal Grandfather        Tobacco Use    Smoking status: Never Smoker    Smokeless tobacco: Never Used   Substance and Sexual Activity    Alcohol use: No    Drug use: No    Sexual activity: Yes     Partners: Female     Birth control/protection: Condom     Review of Systems   Constitutional: Negative for appetite change, chills, fatigue and fever.   HENT: Negative for congestion, facial swelling, sore throat and trouble swallowing.    Eyes: Negative for photophobia and visual disturbance.   Respiratory: Negative for cough, chest tightness and shortness of breath.    Cardiovascular: Positive for leg swelling. Negative for chest pain and palpitations.   Gastrointestinal: Negative for abdominal pain, constipation, diarrhea, nausea and vomiting.   Endocrine: Negative  for polydipsia, polyphagia and polyuria.   Genitourinary: Negative for difficulty urinating and flank pain.   Musculoskeletal: Negative for arthralgias.   Skin: Positive for color change and wound.   Allergic/Immunologic: Negative.    Neurological: Positive for weakness and numbness. Negative for dizziness.   Hematological: Negative for adenopathy. Does not bruise/bleed easily.   Psychiatric/Behavioral: Negative for agitation, behavioral problems, confusion and hallucinations.     Objective:     Vital Signs (Most Recent):  Temp: 98.1 °F (36.7 °C) (07/15/19 1139)  Pulse: 99 (07/15/19 1139)  Resp: 18 (07/15/19 1139)  BP: 137/79 (07/15/19 1139)  SpO2: (!) 92 % (07/15/19 1139) Vital Signs (24h Range):  Temp:  [98.1 °F (36.7 °C)-100.9 °F (38.3 °C)] 98.1 °F (36.7 °C)  Pulse:  [] 99  Resp:  [16-20] 18  SpO2:  [92 %-99 %] 92 %  BP: ()/() 137/79     Weight: 89.6 kg (197 lb 8.5 oz)  Body mass index is 26.06 kg/m².    Foot Exam    General  Orientation: alert and oriented to person, place, and time       Right Foot/Ankle     Inspection and Palpation  Ecchymosis: none  Tenderness: none   Swelling: dorsum   Skin Exam: ulcer; (plantar aspect of 4th MTP joint; Granular wound base; Negative probe to bone. No drainage or malodor noted. No eryhthema)    Neurovascular  Dorsalis pedis: 1+  Posterior tibial: 1+  Saphenous nerve sensation: absent  Tibial nerve sensation: absent  Superficial peroneal nerve sensation: absent  Deep peroneal nerve sensation: absent  Sural nerve sensation: absent      Left Foot/Ankle      Inspection and Palpation  Ecchymosis: none  Tenderness: none   Swelling: dorsum   Skin Exam: drainage and ulcer; (ulcer noted at the lateral plantar aspect of 5th met head with purulent drainage, positive probe to bone, tracking distally to 5th digit and medially 2cm. Liquefying necrosis of fat, subQ, fascia to level of tendon noted.)    Neurovascular  Dorsalis pedis: 1+  Posterior tibial:  1+            Laboratory:  A1C:   Recent Labs   Lab 07/12/19  1023   HGBA1C >14.0*     CBC:   Recent Labs   Lab 07/15/19  0305   WBC 14.29*   RBC 3.87*   HGB 10.8*   HCT 34.1*   *   MCV 88   MCH 27.9   MCHC 31.7*     CMP:   Recent Labs   Lab 07/15/19  0749   *   CALCIUM 8.8   ALBUMIN 1.9*   PROT 6.9      K 3.2*   CO2 25      BUN 6   CREATININE 0.9   ALKPHOS 104   ALT 5*   AST 9*   BILITOT 0.4     CRP:   Recent Labs   Lab 07/12/19  1037   CRP 95.8*     ESR:   Recent Labs   Lab 07/12/19  1037   SEDRATE >120*     Wound Cultures:   Recent Labs   Lab 07/12/19  1257 07/14/19  1728   LABAERO STREPTOCOCCUS AGALACTIAE (GROUP B)  Few  Beta-hemolytic streptococci are routinely susceptible to   penicillins,cephalosporins and carbapenems.  Susceptibility testing not routinely performed  *  STAPHYLOCOCCUS AUREUS  Few  * No growth     Microbiology Results (last 7 days)     Procedure Component Value Units Date/Time    Blood culture [[id number]] Collected:  07/14/19 0629    Order Status:  Completed Specimen:  Blood Updated:  07/15/19 1012     Blood Culture, Routine No Growth to date      No Growth to date    Aerobic culture [[id number]]  (Abnormal)  (Susceptibility) Collected:  07/12/19 1257    Order Status:  Completed Specimen:  Wound from Foot, Left Updated:  07/15/19 0953     Aerobic Bacterial Culture STREPTOCOCCUS AGALACTIAE (GROUP B)  Few  Beta-hemolytic streptococci are routinely susceptible to   penicillins,cephalosporins and carbapenems.  Susceptibility testing not routinely performed        STAPHYLOCOCCUS AUREUS  Few      Culture, Anaerobe [[id number]] Collected:  07/12/19 1257    Order Status:  Completed Specimen:  Wound from Foot, Left Updated:  07/15/19 0856     Anaerobic Culture Culture in progress    Blood culture [[id number]] Collected:  07/15/19 0305    Order Status:  Sent Specimen:  Blood from Antecubital, Right Updated:  07/15/19 0739    Blood culture [[id number]] Collected:  07/15/19 0305     Order Status:  Sent Specimen:  Blood from Antecubital, Right Updated:  07/15/19 0739    Culture, Anaerobe [[id number]] Collected:  07/14/19 1728    Order Status:  Completed Specimen:  Wound from Toe, Left Foot Updated:  07/15/19 0727     Anaerobic Culture Culture in progress    Narrative:       Left 5th metatarsal    Aerobic culture [[id number]] Collected:  07/14/19 1728    Order Status:  Completed Specimen:  Wound from Toe, Left Foot Updated:  07/15/19 0719     Aerobic Bacterial Culture No growth    Narrative:       Left 5th metatarsal    Blood culture [[id number]] Collected:  07/13/19 0358    Order Status:  Completed Specimen:  Blood Updated:  07/15/19 0612     Blood Culture, Routine No Growth to date      No Growth to date      No Growth to date    Blood culture [[id number]] Collected:  07/13/19 0358    Order Status:  Completed Specimen:  Blood Updated:  07/15/19 0612     Blood Culture, Routine No Growth to date      No Growth to date      No Growth to date    Blood culture x two cultures. Draw prior to antibiotics. [[id number]]  (Abnormal) Collected:  07/12/19 1044    Order Status:  Completed Specimen:  Blood from Peripheral, Antecubital, Right Updated:  07/14/19 0947     Blood Culture, Routine Gram stain mary bottle: Gram positive cocci in chains resembling Strep      Results called to and read back by:Farrah Ayala RN 07/12/2019        22:47      Gram stain aer bottle: Gram positive cocci in chains resembling Strep       07/13/2019  06:46      STREPTOCOCCUS AGALACTIAE (GROUP B)  Beta-hemolytic streptococci are routinely susceptible to   penicillins,cephalosporins and carbapenems.  For susceptibility see order #[id number]      Narrative:       Aerobic and anaerobic    Blood culture x two cultures. Draw prior to antibiotics. [[id number]]  (Abnormal)  (Susceptibility) Collected:  07/12/19 1044    Order Status:  Completed Specimen:  Blood from Peripheral, Antecubital, Left Updated:  07/14/19 0947     Blood Culture,  Routine Gram stain mary bottle: Gram positive cocci in chains resembling Strep      Results called to and read back by:Farrah Ayala RN 07/12/2019        22:46      Gram stain aer bottle: Gram positive cocci in chains resembling Strep       07/13/2019  06:43      STREPTOCOCCUS AGALACTIAE (GROUP B)  Beta-hemolytic streptococci are routinely susceptible to   penicillins,cephalosporins and carbapenems.      Narrative:       Aerobic and anaerobic        Specimen (12h ago, onward)    None          Diagnostic Results:  X-Ray: I have reviewed all pertinent results/findings within the past 24 hours.     Xrays of the left foot show osteomyelitis of the 5th ray with focal lytic bone destruction involving the base of the proximal phalanx of the 5th toe and the head of the 5th metatarsal.      Clinical Findings:    Ulcer Location: left 5th mtpj  Measurements:7x1x0.4cm  Periwound: viable, macerated  Drainage: serosanguinous.  Base:  100% granular. 0% fibrin.   5th metatarsal head is spongy to feel.      7/15          7/12        7/12 Ulcer prior to bedside debridement in the ED        7/12 Right foot ulcer at plantar 4th MTP joint. Granular, superficial wound base. Does not probe to bone. No purulence or active drainage, No edema, no erythema. No fluctuance or crepitus noted. Stable wound.             Assessment/Plan:     * Acute osteomyelitis  Indio Ryder Jr. is a 49 y.o. male  S/P Debridement left foot wound (DOS: 7/14 per Dr. Hickman). Left 5th meatarsal head spongy to feel.  -Discussed need of further debridement, washout, to include left 5th ray amputation with graft application.  Patient is currently not amenable to amputation, and will think about his options.  Will discuss again.  Case requested this Wednesday.  -OR cultures pending  -Abx per ID, appreciate recs  -applied betadine wet to dry gauze dressings    Non weight bearing left foot.      Leukocytosis  As above    Sepsis  As above    Type 2 diabetes  mellitus with left eye affected by mild nonproliferative retinopathy without macular edema, without long-term current use of insulin  Managed per hospital medicine    Diabetic ketoacidosis without coma associated with type 2 diabetes mellitus  Managed per hospital medicine & endocrinology     Type 2 diabetes mellitus with hyperglycemia, with long-term current use of insulin  Managed by hospital medicine/endo team    Mixed hyperlipidemia  Managed per hospital medicine    Essential hypertension  Per primary        Martin Parker MD  Podiatry  [facility]

## 2019-07-15 NOTE — OP NOTE
Operative Note       Surgery Date: 7/14/2019     Surgeon(s) and Role:     * Sis Hickman DPM - Primary     * Romy Hi PGY1 - Resident - Assisting    Pre-op Diagnosis:  Left foot infection [L08.9]  Sepsis, due to unspecified organism [A41.9]    Post-op Diagnosis: Post-Op Diagnosis Codes:     * Left foot infection [L08.9]     * Sepsis, due to unspecified organism [A41.9]    Procedure(s) (LRB):  Incision and drainage to bone of foot (Left)   Excisional Debridement of right foot ulcer (RIGHT)    Anesthesia: Local MAC    Procedure in Detail/Findings:  The patient was brought to the operating room on a hospital bed in a supine position. A timeout was performed verifying the patient's identity, surgical sites, medications, and allergies. Following the successful induction of MAC anesthesia  a reverse alba block was given using 10 ml of 1:1 mixture of 1% lidocaine plain and 0.5% Bupivicain plain. The left and right foot were prepped and draped in a sterile manner.     Attention was directed to the left foot ulcer on the lateral plantar 5th MTP joint where a medial track was noted probing to the central midfoot level. Utilizing a #15 blade, an incision was made along the track and carried down tthrough subcutaneous tissue and deep fascial planes to drain approximately 15 ml of pus.  The incision was carried across the plantar 5th metatarsal-phalangeal joint to the level of bone where the base of the proximal phalanx was incised and drainaed with a blade, then rongeurs. The 5th metatarsal head and base of the 5th proximal phalanx was noted to be exposed. The ulcer medially then was noted to track proximally to the calcaneus.  Purulent drainage was expressed from that track approximately 5 ml. The 5th metatarsal head appeared viable and salvageable and a clean rongeur was used to resect a small portion of the 5th metatarsal head to be sent to pathology for culture and pathology. The wound was copiously irrigated  via pulse lavage using normal sterile saline solution. Aerobic and Anaerobic cultures were collected intraoperative and sent to pathology.  The wound was left open and packed with iodoform packing strips.     Attention was directed to the right foot plantar ulcer at the level of the 4th MTP joint that was noted to be covered with eschar and callus. A #15 blade was used to excisionally debride fibrotic tissue from the wound base until 100% granular wound base was achieved to the level of adipose tissue.  Tissue removed included eschar, callus, fibrin, subcutaneous tissue. Prior to debridement the wound measured 0.7 cm x 1 cm x 0.1 cm. Post debridement the wound measured 1 cm x 1 cm x  0.2 cm. Xeroform was applied to the right plantar foot ulcer and sterile compressive dressing consisting of 4x4 gauze, kerlix, and ACE bandage was applied to the right foot.     Sterile compressive dressing consisting of xeroform, 4x4 gauze, cast padding, kerlix, and ACE bandage was applied to the left foot. The patient tolerated the procedure and anesthesia well. The patient was transported to recovery with vital signs stable and vascular status intact as well as the following post op instructions.     1. Keep dressing clean dry and intact until podiatry rounds   2. Avoid excessive ambulation, ambulate with surgical shoe on at all times. Ok to partial weightbear on heels for short transfers.   3. Ice and elevate right foot when at rest.  4. Medical management to continue while inpatient.   5. Contact Podiatry for all post op follow up care and if any problems arise.      Estimated Blood Loss: 15 mL           Specimens (From admission, onward)    Start     Ordered    07/14/19 1716  Specimen to Pathology - Surgery  Once     Start Status     07/14/19 1716 Collected (07/14/19 1728) Order ID: [id number]       07/14/19 1723        Implants: * No implants in log *           Disposition: PACU - hemodynamically stable.           Condition:  Good    Attestation:  I was present and scrubbed for the entire procedure.

## 2019-07-15 NOTE — SUBJECTIVE & OBJECTIVE
"Interval HPI:   Pt on regular insulin transition ggt @ 0.6units/hr with glucose levels ranging between 150- 170's.  Pt still NPO   Overnight events:  No overnight events   Eating:   NPO  Nausea: No  Hypoglycemia and intervention: No  Fever: No  TPN and/or TF: No    BP (!) 162/98 (BP Location: Right arm, Patient Position: Lying)   Pulse 95   Temp 98.9 °F (37.2 °C) (Oral)   Resp 17   Ht 6' 1" (1.854 m)   Wt 89.6 kg (197 lb 8.5 oz)   SpO2 98%   BMI 26.06 kg/m²     Labs Reviewed and Include    Recent Labs   Lab 07/15/19  0749   *   CALCIUM 8.8   ALBUMIN 1.9*   PROT 6.9      K 3.2*   CO2 25      BUN 6   CREATININE 0.9   ALKPHOS 104   ALT 5*   AST 9*   BILITOT 0.4     Lab Results   Component Value Date    WBC 14.29 (H) 07/15/2019    HGB 10.8 (L) 07/15/2019    HCT 34.1 (L) 07/15/2019    MCV 88 07/15/2019     (H) 07/15/2019     No results for input(s): TSH, FREET4 in the last 168 hours.  Lab Results   Component Value Date    HGBA1C >14.0 (H) 07/12/2019       Nutritional status:   Body mass index is 26.06 kg/m².  Lab Results   Component Value Date    ALBUMIN 1.9 (L) 07/15/2019    ALBUMIN 2.0 (L) 07/14/2019    ALBUMIN 1.9 (L) 07/14/2019     No results found for: PREALBUMIN    Estimated Creatinine Clearance: 109.7 mL/min (based on SCr of 0.9 mg/dL).    Accu-Checks  Recent Labs     07/13/19  2159 07/13/19  2357 07/14/19  0624 07/14/19  0741 07/14/19  1129 07/14/19  1545 07/14/19  1747 07/14/19  2109 07/15/19  0533 07/15/19  0820   POCTGLUCOSE 78 113* 152* 163* 153* 167* 171* 151* 149* 161*       Current Medications and/or Treatments Impacting Glycemic Control  Immunotherapy:    Immunosuppressants     None        Steroids:   Hormones (From admission, onward)    None        Pressors:    Autonomic Drugs (From admission, onward)    None        Hyperglycemia/Diabetes Medications:   Antihyperglycemics (From admission, onward)    Start     Stop Route Frequency Ordered    07/14/19 0715  insulin aspart " U-100 pen 5 Units      -- SubQ 3 times daily with meals 07/13/19 2212 07/13/19 2045  insulin regular (Humulin R) 100 Units in sodium chloride 0.9% 100 mL infusion      -- IV Continuous 07/13/19 1937 07/13/19 2037  insulin aspart U-100 pen 0-4 Units      -- SubQ As needed (PRN) 07/13/19 1937

## 2019-07-15 NOTE — NURSING
Dr. Weathers called and notified of increased temp and hr. NS 1l bolus ordered and Bld Cx's to be drawn in AM.

## 2019-07-15 NOTE — ASSESSMENT & PLAN NOTE
Admitted in DKA.  Blood cultures positive for GBS. Wound cultures also with GBS and MSSA. Per podiatry note, there is necrosis, purulence tracking down to bone. Pt is s/p I&D and 5th ray amputation in the OR 7/14.  - Tmax- 100.9. Leukocytosis of 14K  - clean margin cultures and pathology pending.     Plan:  1. De-escalate from vanc and zosyn to cefazolin 6 gram IV cont infusion (for group B strep and MSSA)  2. F/u on repeat blood cultures  3. Monitor fever curve, WBC count  4. Recommend ABIs to assess vascular status  5. Follow podiatry plans  6. Will follow

## 2019-07-15 NOTE — ASSESSMENT & PLAN NOTE
Indio ALVARADO Britni Anderson is a 49 y.o. male  S/P Debridement left foot wound (DOS: 7/14 per Dr. Hickman). Left 5th meatarsal head spongy to feel.  -Discussed need of further debridement, washout, to include left 5th ray amputation with graft application.  Patient is currently not amenable to amputation, and will think about his options.  Will discuss again.  Case requested this Wednesday.  -OR cultures pending  -Abx per ID, appreciate recs  -applied betadine wet to dry gauze dressings    Non weight bearing left foot.

## 2019-07-15 NOTE — ANESTHESIA POSTPROCEDURE EVALUATION
Anesthesia Post Evaluation    Patient: Indio Ryder Jr.    Procedure(s) Performed: Procedure(s) (LRB):  DEBRIDEMENT, FOOT, with left 5th ray amputation (Left)  IRRIGATION AND DEBRIDEMENT of right foot ulcer (Right)    Final Anesthesia Type: general  Patient location during evaluation: PACU  Patient participation: Yes- Able to Participate  Level of consciousness: awake and alert  Post-procedure vital signs: reviewed and stable  Pain management: adequate  Airway patency: patent  PONV status at discharge: No PONV  Anesthetic complications: no      Cardiovascular status: blood pressure returned to baseline and hemodynamically stable  Respiratory status: unassisted, spontaneous ventilation and room air  Hydration status: euvolemic  Follow-up not needed.          Vitals Value Taken Time   /98 7/15/2019  8:31 AM   Temp 37.2 °C (98.9 °F) 7/15/2019  8:31 AM   Pulse 96 7/15/2019  8:31 AM   Resp 17 7/15/2019  8:31 AM   SpO2 98 % 7/15/2019  8:31 AM         Event Time     Out of Recovery 07/14/2019 18:42:07          Pain/Jenny Score: Pain Rating Prior to Med Admin: 6 (7/14/2019 10:36 PM)  Pain Rating Post Med Admin: 4 (7/14/2019 11:36 PM)  Jenny Score: 10 (7/14/2019  6:34 PM)

## 2019-07-16 ENCOUNTER — ANESTHESIA EVENT (OUTPATIENT)
Dept: SURGERY | Facility: HOSPITAL | Age: 49
DRG: 853 | End: 2019-07-16
Payer: MEDICAID

## 2019-07-16 LAB
ALBUMIN SERPL BCP-MCNC: 1.8 G/DL (ref 3.5–5.2)
ALBUMIN SERPL BCP-MCNC: 2.1 G/DL (ref 3.5–5.2)
ALP SERPL-CCNC: 102 U/L (ref 55–135)
ALP SERPL-CCNC: 115 U/L (ref 55–135)
ALT SERPL W/O P-5'-P-CCNC: 5 U/L (ref 10–44)
ALT SERPL W/O P-5'-P-CCNC: <5 U/L (ref 10–44)
ANION GAP SERPL CALC-SCNC: 14 MMOL/L (ref 8–16)
ANION GAP SERPL CALC-SCNC: 16 MMOL/L (ref 8–16)
AST SERPL-CCNC: 10 U/L (ref 10–40)
AST SERPL-CCNC: 12 U/L (ref 10–40)
BACTERIA SPEC AEROBE CULT: ABNORMAL
BACTERIA SPEC ANAEROBE CULT: ABNORMAL
BASOPHILS # BLD AUTO: 0.02 K/UL (ref 0–0.2)
BASOPHILS NFR BLD: 0.2 % (ref 0–1.9)
BILIRUB SERPL-MCNC: 0.2 MG/DL (ref 0.1–1)
BILIRUB SERPL-MCNC: 0.2 MG/DL (ref 0.1–1)
BUN SERPL-MCNC: 7 MG/DL (ref 6–20)
BUN SERPL-MCNC: 8 MG/DL (ref 6–20)
CALCIUM SERPL-MCNC: 9.2 MG/DL (ref 8.7–10.5)
CALCIUM SERPL-MCNC: 9.3 MG/DL (ref 8.7–10.5)
CHLORIDE SERPL-SCNC: 104 MMOL/L (ref 95–110)
CHLORIDE SERPL-SCNC: 106 MMOL/L (ref 95–110)
CO2 SERPL-SCNC: 23 MMOL/L (ref 23–29)
CO2 SERPL-SCNC: 29 MMOL/L (ref 23–29)
CREAT SERPL-MCNC: 1.2 MG/DL (ref 0.5–1.4)
CREAT SERPL-MCNC: 1.5 MG/DL (ref 0.5–1.4)
DIFFERENTIAL METHOD: ABNORMAL
EOSINOPHIL # BLD AUTO: 0 K/UL (ref 0–0.5)
EOSINOPHIL NFR BLD: 0.4 % (ref 0–8)
ERYTHROCYTE [DISTWIDTH] IN BLOOD BY AUTOMATED COUNT: 12.9 % (ref 11.5–14.5)
EST. GFR  (AFRICAN AMERICAN): >60 ML/MIN/1.73 M^2
EST. GFR  (AFRICAN AMERICAN): >60 ML/MIN/1.73 M^2
EST. GFR  (NON AFRICAN AMERICAN): 53.1 ML/MIN/1.73 M^2
EST. GFR  (NON AFRICAN AMERICAN): >60 ML/MIN/1.73 M^2
GLUCOSE SERPL-MCNC: 209 MG/DL (ref 70–110)
GLUCOSE SERPL-MCNC: 253 MG/DL (ref 70–110)
HCT VFR BLD AUTO: 37.1 % (ref 40–54)
HGB BLD-MCNC: 11.5 G/DL (ref 14–18)
IMM GRANULOCYTES # BLD AUTO: 0.04 K/UL (ref 0–0.04)
IMM GRANULOCYTES NFR BLD AUTO: 0.4 % (ref 0–0.5)
LYMPHOCYTES # BLD AUTO: 1.1 K/UL (ref 1–4.8)
LYMPHOCYTES NFR BLD: 10 % (ref 18–48)
MAGNESIUM SERPL-MCNC: 2 MG/DL (ref 1.6–2.6)
MCH RBC QN AUTO: 28 PG (ref 27–31)
MCHC RBC AUTO-ENTMCNC: 31 G/DL (ref 32–36)
MCV RBC AUTO: 90 FL (ref 82–98)
MONOCYTES # BLD AUTO: 1 K/UL (ref 0.3–1)
MONOCYTES NFR BLD: 9 % (ref 4–15)
NEUTROPHILS # BLD AUTO: 8.7 K/UL (ref 1.8–7.7)
NEUTROPHILS NFR BLD: 80 % (ref 38–73)
NRBC BLD-RTO: 0 /100 WBC
PHOSPHATE SERPL-MCNC: 2.9 MG/DL (ref 2.7–4.5)
PLATELET # BLD AUTO: 351 K/UL (ref 150–350)
PMV BLD AUTO: 10.4 FL (ref 9.2–12.9)
POCT GLUCOSE: 207 MG/DL (ref 70–110)
POCT GLUCOSE: 210 MG/DL (ref 70–110)
POCT GLUCOSE: 212 MG/DL (ref 70–110)
POCT GLUCOSE: 238 MG/DL (ref 70–110)
POCT GLUCOSE: 259 MG/DL (ref 70–110)
POTASSIUM SERPL-SCNC: 3.4 MMOL/L (ref 3.5–5.1)
POTASSIUM SERPL-SCNC: 3.5 MMOL/L (ref 3.5–5.1)
PROT SERPL-MCNC: 6.8 G/DL (ref 6–8.4)
PROT SERPL-MCNC: 7.6 G/DL (ref 6–8.4)
RBC # BLD AUTO: 4.11 M/UL (ref 4.6–6.2)
SODIUM SERPL-SCNC: 145 MMOL/L (ref 136–145)
SODIUM SERPL-SCNC: 147 MMOL/L (ref 136–145)
WBC # BLD AUTO: 10.87 K/UL (ref 3.9–12.7)

## 2019-07-16 PROCEDURE — 86341 ISLET CELL ANTIBODY: CPT | Mod: 91

## 2019-07-16 PROCEDURE — 99233 SBSQ HOSP IP/OBS HIGH 50: CPT | Mod: ,,, | Performed by: PHYSICIAN ASSISTANT

## 2019-07-16 PROCEDURE — 85025 COMPLETE CBC W/AUTO DIFF WBC: CPT

## 2019-07-16 PROCEDURE — 99233 PR SUBSEQUENT HOSPITAL CARE,LEVL III: ICD-10-PCS | Mod: ,,, | Performed by: PHYSICIAN ASSISTANT

## 2019-07-16 PROCEDURE — 99232 SBSQ HOSP IP/OBS MODERATE 35: CPT | Mod: ,,, | Performed by: INTERNAL MEDICINE

## 2019-07-16 PROCEDURE — 86341 ISLET CELL ANTIBODY: CPT

## 2019-07-16 PROCEDURE — 99232 PR SUBSEQUENT HOSPITAL CARE,LEVL II: ICD-10-PCS | Mod: ,,, | Performed by: INTERNAL MEDICINE

## 2019-07-16 PROCEDURE — 36415 COLL VENOUS BLD VENIPUNCTURE: CPT

## 2019-07-16 PROCEDURE — 83735 ASSAY OF MAGNESIUM: CPT

## 2019-07-16 PROCEDURE — 80053 COMPREHEN METABOLIC PANEL: CPT

## 2019-07-16 PROCEDURE — 63600175 PHARM REV CODE 636 W HCPCS: Performed by: PHYSICIAN ASSISTANT

## 2019-07-16 PROCEDURE — 25000003 PHARM REV CODE 250: Performed by: PHYSICIAN ASSISTANT

## 2019-07-16 PROCEDURE — 25000003 PHARM REV CODE 250: Performed by: HOSPITALIST

## 2019-07-16 PROCEDURE — 63600175 PHARM REV CODE 636 W HCPCS: Performed by: HOSPITALIST

## 2019-07-16 PROCEDURE — 84100 ASSAY OF PHOSPHORUS: CPT

## 2019-07-16 PROCEDURE — 20600001 HC STEP DOWN PRIVATE ROOM

## 2019-07-16 RX ADMIN — METOCLOPRAMIDE 5 MG: 5 INJECTION, SOLUTION INTRAMUSCULAR; INTRAVENOUS at 01:07

## 2019-07-16 RX ADMIN — METOCLOPRAMIDE 5 MG: 5 INJECTION, SOLUTION INTRAMUSCULAR; INTRAVENOUS at 08:07

## 2019-07-16 RX ADMIN — ONDANSETRON 4 MG: 2 INJECTION INTRAMUSCULAR; INTRAVENOUS at 05:07

## 2019-07-16 RX ADMIN — INSULIN ASPART 1 UNITS: 100 INJECTION, SOLUTION INTRAVENOUS; SUBCUTANEOUS at 08:07

## 2019-07-16 RX ADMIN — HYDRALAZINE HYDROCHLORIDE 25 MG: 25 TABLET, FILM COATED ORAL at 08:07

## 2019-07-16 RX ADMIN — INSULIN ASPART 1 UNITS: 100 INJECTION, SOLUTION INTRAVENOUS; SUBCUTANEOUS at 04:07

## 2019-07-16 RX ADMIN — ONDANSETRON 4 MG: 2 INJECTION INTRAMUSCULAR; INTRAVENOUS at 04:07

## 2019-07-16 RX ADMIN — INSULIN ASPART 2 UNITS: 100 INJECTION, SOLUTION INTRAVENOUS; SUBCUTANEOUS at 01:07

## 2019-07-16 RX ADMIN — SODIUM CHLORIDE: 0.9 INJECTION, SOLUTION INTRAVENOUS at 11:07

## 2019-07-16 RX ADMIN — ATORVASTATIN CALCIUM 80 MG: 20 TABLET, FILM COATED ORAL at 09:07

## 2019-07-16 RX ADMIN — AMLODIPINE BESYLATE 5 MG: 5 TABLET ORAL at 09:07

## 2019-07-16 RX ADMIN — ENOXAPARIN SODIUM 40 MG: 100 INJECTION SUBCUTANEOUS at 04:07

## 2019-07-16 RX ADMIN — CEFAZOLIN 6 G: 1 INJECTION, POWDER, FOR SOLUTION INTRAMUSCULAR; INTRAVENOUS at 01:07

## 2019-07-16 RX ADMIN — INSULIN ASPART 2 UNITS: 100 INJECTION, SOLUTION INTRAVENOUS; SUBCUTANEOUS at 08:07

## 2019-07-16 RX ADMIN — ONDANSETRON 4 MG: 2 INJECTION INTRAMUSCULAR; INTRAVENOUS at 11:07

## 2019-07-16 NOTE — SUBJECTIVE & OBJECTIVE
Interval History: now afebrile. NAEO. Leukocytosis resolved. Going back to OR tomorrow with podiatry for further I&D and likely amputation. Bacteremia resolved. Still nauseated. No appetite.     Review of Systems   Constitutional: Positive for appetite change. Negative for chills and fever.   Respiratory: Negative for cough and shortness of breath.    Cardiovascular: Negative for chest pain and leg swelling.   Gastrointestinal: Positive for nausea. Negative for abdominal pain, constipation, diarrhea and vomiting.   Genitourinary: Negative for dysuria, frequency and hematuria.   Musculoskeletal: Negative for back pain and myalgias.   Skin: Positive for color change and wound (right foot). Negative for rash.   Neurological: Negative for dizziness, light-headedness and headaches.   Psychiatric/Behavioral: Negative for agitation and behavioral problems. The patient is not nervous/anxious.      Objective:     Vital Signs (Most Recent):  Temp: 98 °F (36.7 °C) (07/16/19 0738)  Pulse: 101 (07/16/19 0738)  Resp: 18 (07/16/19 0738)  BP: (!) 140/87 (07/16/19 0738)  SpO2: (!) 94 % (07/16/19 0738) Vital Signs (24h Range):  Temp:  [98 °F (36.7 °C)-98.9 °F (37.2 °C)] 98 °F (36.7 °C)  Pulse:  [] 101  Resp:  [18-20] 18  SpO2:  [92 %-97 %] 94 %  BP: (137-175)/(79-97) 140/87     Weight: 89.6 kg (197 lb 8.5 oz)  Body mass index is 26.06 kg/m².    Estimated Creatinine Clearance: 82.3 mL/min (based on SCr of 1.2 mg/dL).    Physical Exam   Constitutional: He is oriented to person, place, and time. He appears well-developed and well-nourished. No distress.   Cardiovascular: Normal rate and regular rhythm.   No murmur heard.  Pulmonary/Chest: Effort normal and breath sounds normal. No respiratory distress.   Abdominal: Soft. Bowel sounds are normal. He exhibits no distension.   Musculoskeletal: Normal range of motion. He exhibits no edema.   Dressing in place to bilateral feet.    Neurological: He is alert and oriented to person,  place, and time.   Skin: Skin is warm and dry.   No ascending erythema or warmth   Psychiatric: He is withdrawn. He exhibits a depressed mood.       Significant Labs:   Blood Culture:   Recent Labs   Lab 07/12/19  1044 07/13/19  0358 07/14/19  0629 07/15/19  0305   LABBLOO Gram stain mary bottle: Gram positive cocci in chains resembling Strep  Results called to and read back by:Farrah Ayala RN 07/12/2019    22:47  Gram stain aer bottle: Gram positive cocci in chains resembling Strep   07/13/2019  06:46  STREPTOCOCCUS AGALACTIAE (GROUP B)  Beta-hemolytic streptococci are routinely susceptible to   penicillins,cephalosporins and carbapenems.  For susceptibility see order #[id number]  *  Gram stain mary bottle: Gram positive cocci in chains resembling Strep  Results called to and read back by:Farrah Ayala RN 07/12/2019    22:46  Gram stain aer bottle: Gram positive cocci in chains resembling Strep   07/13/2019  06:43  STREPTOCOCCUS AGALACTIAE (GROUP B)  Beta-hemolytic streptococci are routinely susceptible to   penicillins,cephalosporins and carbapenems.  * No Growth to date  No Growth to date  No Growth to date  No Growth to date  No Growth to date  No Growth to date  No Growth to date  No Growth to date No Growth to date  No Growth to date  No Growth to date No Growth to date  No Growth to date  No Growth to date  No Growth to date     CBC:   Recent Labs   Lab 07/15/19  0305 07/16/19  0316   WBC 14.29* 10.87   HGB 10.8* 11.5*   HCT 34.1* 37.1*   * 351*     CMP:   Recent Labs   Lab 07/15/19  0749 07/15/19  1956 07/16/19  0757    141 145   K 3.2* 3.7 3.4*    103 106   CO2 25 25 23   * 183* 209*   BUN 6 7 8   CREATININE 0.9 1.1 1.2   CALCIUM 8.8 8.8 9.2   PROT 6.9 6.9 6.8   ALBUMIN 1.9* 1.9* 1.8*   BILITOT 0.4 0.3 0.2   ALKPHOS 104 106 102   AST 9* 13 10   ALT 5* <5* <5*   ANIONGAP 14 13 16   EGFRNONAA >60.0 >60.0 >60.0     Wound Culture:   Recent Labs   Lab  07/12/19  1257 07/14/19  1728   LABAERO STREPTOCOCCUS AGALACTIAE (GROUP B)  Few  Beta-hemolytic streptococci are routinely susceptible to   penicillins,cephalosporins and carbapenems.  Susceptibility testing not routinely performed  *  STAPHYLOCOCCUS AUREUS  Few  * STREPTOCOCCUS AGALACTIAE (GROUP B)  Rare  Beta-hemolytic streptococci are routinely susceptible to   penicillins,cephalosporins and carbapenems.  Susceptibility testing not routinely performed  Skin janes also present  *       Significant Imaging: I have reviewed all pertinent imaging results/findings within the past 24 hours.

## 2019-07-16 NOTE — PROGRESS NOTES
[facility]  Infectious Disease  Progress Note    Patient Name: nIdio Ryder Jr.  MRN: [id number]  Admission Date: 7/12/2019  Length of Stay: 4 days  Attending Physician: Luis Rust MD  Primary Care Provider: Lorena Thakur MD    Isolation Status: No active isolations  Assessment/Plan:      Bacteremia due to group B Streptococcus  49 year old male with uncontrolled DM and HTN admitted in DKA. Also had left foot wound and Group B strep bacteremia.  Wound cultures grew GBS and MSSA. Per podiatry note, there was necrosis and purulence tracking down to bone. Pt is s/p I&D with podiatry 7/14; left 5th met head noted to be spongy. Cultures from met head with Group B strep.   - fever and leukocytosis resolved.   - ABIs no significant PAD    Plan:  1. Continue cefazolin 6 gram IV cont infusion (for group B strep and MSSA)  2. Recommend further debridement in the OR and left 5th ray amputation for source control as unlikely to heal with antibiotics alone; if amputation Is done, please send clean margins for culture and pathology; if all infection removed in the OR, anticipate 2 weeks of IV antibiotics for bacteremia from date of surgery; if there is concern for residual osteomyelitis, will need 6 weeks of IV therapy; can f/u pathology as outpatient  3. Will need PICC line prior to discharge  4. F/u on podiatry surgical note tomorrow  5. Will follow      Anticipated Disposition: IV antibiotics  Thank you for your consult. I will follow-up with patient. Please contact us if you have any additional questions.  RAMILA Rick Pager: [phone]  Infectious Disease  [facility]    Subjective:     Principal Problem:Acute osteomyelitis    HPI: This is a 49 year old male with a PMH of DM (poorly controlled, with long term insulin use) and HTN who presented to the ED with nausea, vomiting, and foot ulcer. He reports 4-5 days of nausea and vomiting. Denies abdominal pain.  he also has  associated chills but has not taken his temperature.  He has been managing his right foot ulcer at home with dressing but over the past 2 weeks an ulcer on his left foot has developed. He denies any pain or injury- has chronic numbness of bilateral lower extremities and hands from diabetic neuropathy.  Found to be in DKA in the emergency department and started on insulin drip. Per podiatry, the wound was necrotic with fluctuance and purulence down to bone. Patient febrile to 101.4 with leukocytosis of 14,000. His blood cultures now positive for group B strep. Wound culture also with GBS. Pt on empiric vancomycin and zosyn. ID consulted for recs.     Patient reports he still is feeling bad. No more vomiting. Family at bedside.     Interval History: now afebrile. NAEO. Leukocytosis resolved. Going back to OR tomorrow with podiatry for further I&D and likely amputation. Bacteremia resolved. Still nauseated. No appetite.     Review of Systems   Constitutional: Positive for appetite change. Negative for chills and fever.   Respiratory: Negative for cough and shortness of breath.    Cardiovascular: Negative for chest pain and leg swelling.   Gastrointestinal: Positive for nausea. Negative for abdominal pain, constipation, diarrhea and vomiting.   Genitourinary: Negative for dysuria, frequency and hematuria.   Musculoskeletal: Negative for back pain and myalgias.   Skin: Positive for color change and wound (right foot). Negative for rash.   Neurological: Negative for dizziness, light-headedness and headaches.   Psychiatric/Behavioral: Negative for agitation and behavioral problems. The patient is not nervous/anxious.      Objective:     Vital Signs (Most Recent):  Temp: 98 °F (36.7 °C) (07/16/19 0738)  Pulse: 101 (07/16/19 0738)  Resp: 18 (07/16/19 0738)  BP: (!) 140/87 (07/16/19 0738)  SpO2: (!) 94 % (07/16/19 0738) Vital Signs (24h Range):  Temp:  [98 °F (36.7 °C)-98.9 °F (37.2 °C)] 98 °F (36.7 °C)  Pulse:  []  101  Resp:  [18-20] 18  SpO2:  [92 %-97 %] 94 %  BP: (137-175)/(79-97) 140/87     Weight: 89.6 kg (197 lb 8.5 oz)  Body mass index is 26.06 kg/m².    Estimated Creatinine Clearance: 82.3 mL/min (based on SCr of 1.2 mg/dL).    Physical Exam   Constitutional: He is oriented to person, place, and time. He appears well-developed and well-nourished. No distress.   Cardiovascular: Normal rate and regular rhythm.   No murmur heard.  Pulmonary/Chest: Effort normal and breath sounds normal. No respiratory distress.   Abdominal: Soft. Bowel sounds are normal. He exhibits no distension.   Musculoskeletal: Normal range of motion. He exhibits no edema.   Dressing in place to bilateral feet.    Neurological: He is alert and oriented to person, place, and time.   Skin: Skin is warm and dry.   No ascending erythema or warmth   Psychiatric: He is withdrawn. He exhibits a depressed mood.       Significant Labs:   Blood Culture:   Recent Labs   Lab 07/12/19  1044 07/13/19  0358 07/14/19  0629 07/15/19  0305   LABBLOO Gram stain mary bottle: Gram positive cocci in chains resembling Strep  Results called to and read back by:Farrah Ayala RN 07/12/2019    22:47  Gram stain aer bottle: Gram positive cocci in chains resembling Strep   07/13/2019  06:46  STREPTOCOCCUS AGALACTIAE (GROUP B)  Beta-hemolytic streptococci are routinely susceptible to   penicillins,cephalosporins and carbapenems.  For susceptibility see order #[id number]  *  Gram stain mary bottle: Gram positive cocci in chains resembling Strep  Results called to and read back by:Farrah Ayala RN 07/12/2019    22:46  Gram stain aer bottle: Gram positive cocci in chains resembling Strep   07/13/2019  06:43  STREPTOCOCCUS AGALACTIAE (GROUP B)  Beta-hemolytic streptococci are routinely susceptible to   penicillins,cephalosporins and carbapenems.  * No Growth to date  No Growth to date  No Growth to date  No Growth to date  No Growth to date  No Growth to date   No Growth to date  No Growth to date No Growth to date  No Growth to date  No Growth to date No Growth to date  No Growth to date  No Growth to date  No Growth to date     CBC:   Recent Labs   Lab 07/15/19  0305 07/16/19  0316   WBC 14.29* 10.87   HGB 10.8* 11.5*   HCT 34.1* 37.1*   * 351*     CMP:   Recent Labs   Lab 07/15/19  0749 07/15/19  1956 07/16/19  0757    141 145   K 3.2* 3.7 3.4*    103 106   CO2 25 25 23   * 183* 209*   BUN 6 7 8   CREATININE 0.9 1.1 1.2   CALCIUM 8.8 8.8 9.2   PROT 6.9 6.9 6.8   ALBUMIN 1.9* 1.9* 1.8*   BILITOT 0.4 0.3 0.2   ALKPHOS 104 106 102   AST 9* 13 10   ALT 5* <5* <5*   ANIONGAP 14 13 16   EGFRNONAA >60.0 >60.0 >60.0     Wound Culture:   Recent Labs   Lab 07/12/19  1257 07/14/19  1728   LABAERO STREPTOCOCCUS AGALACTIAE (GROUP B)  Few  Beta-hemolytic streptococci are routinely susceptible to   penicillins,cephalosporins and carbapenems.  Susceptibility testing not routinely performed  *  STAPHYLOCOCCUS AUREUS  Few  * STREPTOCOCCUS AGALACTIAE (GROUP B)  Rare  Beta-hemolytic streptococci are routinely susceptible to   penicillins,cephalosporins and carbapenems.  Susceptibility testing not routinely performed  Skin janes also present  *       Significant Imaging: I have reviewed all pertinent imaging results/findings within the past 24 hours.

## 2019-07-16 NOTE — PLAN OF CARE
07/16/19 1527   Post-Acute Status   Post-Acute Authorization Placement   Post-Acute Placement Status Referrals Sent     Patient expected to discharge home when medically ready with home infusion. SW sent a referral to [facility] and will follow up.    Josefina Hammond LMSW  [facility]   [id number]

## 2019-07-16 NOTE — ASSESSMENT & PLAN NOTE
- New L foot OM  - management per primary and Podiatry  - On IV Abx  -Tight glucose control (Goal 140-180)

## 2019-07-16 NOTE — PHYSICIAN QUERY
"PT Name: Indio Ryder Jr.  MR #: [id number]    Physician Query Form - Nutrition Clarification     CDS: Shayy Thorne RN, CCDS         Contact information :[phone]  [email or web address]       This form is a permanent document in the medical record.     Query Date: July 16, 2019    By submitting this query, we are merely seeking further clarification of documentation.. Please utilize your independent clinical judgment when addressing the question(s) below.    The Medical record contains the following:   Indicators  Supporting Clinical Findings Location in Medical Record    % of Estimated Energy Intake over a time frame from p.o., TF, or TPN      Weight Status over a time frame      Subcutaneous Fat and/or Muscle Loss     x Fluid Accumulation or Edema "he exhibits edema" H&P 7/12/19    Reduced  Strength     x Wt / BMI / Usual Body Weight BMI 27 H&P 7/12/19    Delayed Wound Healing / Failure to Thrive     x Acute or Chronic Illness "Positive for vomiting"  "Acute osteomyelitis"  "Sepsis"       H&P 7/12/19    Medication      Treatment Consult to RD -pt declined at this time-requested dietician come back   RD consult 7/13/19   x Other "appetite change (Poor appetite for the last and), chills, fatigue, fever (weight loss) and unexpected weight change."   H&P 7/12/19       AND / ASPEN Clinical Characteristics (October 2011)  A minimum of two characteristics is recommended for diagnosing either moderate or severe malnutrition   Mild Malnutrition Moderate Malnutrition Severe Malnutrition   Energy Intake from p.o., TF or TPN. < 75% intake of estimated energy needs for less than 7 days < 75% intake of estimated energy needs for greater than 7 days < 50% intake of estimated energy needs for > 5 days   Weight Loss 1-2% in 1 month  5% in 3 months  7.5% in 6 months  10% in 1 year 1-2 % in 1 week  5% in 1 month  7.5% in 3 months  10% in 6 months  20% in 1 year > 2% in 1 week  > 5% in 1 month  > 7.5% in 3 " months  > 10% in 6 months  > 20% in 1 year   Physical Findings     None *Mild subcutaneous fat and/or muscle loss  *Mild fluid accumulation  *Stage II decubitus  *Surgical wound or non-healing wound *Mod/severe subcutaneous fat and/or muscle loss  *Mod/severe fluid accumulation  *Stage III or IV decubitus  *Non-healing surgical wound     Doctor, please specify diagnosis or diagnoses associated with above clinical findings.    [x   ] Mild Protein-Calorie Malnutrition   [   ] Abnormal Weight Loss   [   ] Other Nutritional Diagnosis (please specify):    [   ] Other:    [  ] Clinically Undetermined       Please document in your progress notes daily for the duration of treatment until resolved and include in your discharge summary.

## 2019-07-16 NOTE — ANESTHESIA PREPROCEDURE EVALUATION
[facility]  Anesthesia Pre-Operative Evaluation         Patient Name: Indio Ryder Jr.  YOB: 1970  MRN: [id number]    SUBJECTIVE:     Pre-operative evaluation for Procedure(s) (LRB):  DEBRIDEMENT, FOOT, possible left 5th ray amputation (Left)     07/16/2019    Indio Ryder Jr. is a 49 y.o. male w/ a significant PMHx of poorly controlled DM, HTN, and previous R foot osteomyelitis who presents w/ sepsis and DKA. Found to have significant L foot ulceration and infection. S/p wound debridement 7/14, now scheduled for repeat.      Patient now presents for the above procedure(s).      LDA: None documented.    Prev airway: Placement Date: 01/16/17; Placement Time: 0703; Method of Intubation: Direct laryngoscopy; Inserted by: CRNA; Airway Device: Endotracheal Tube; Mask Ventilation: Easy; Intubated: Postinduction; Blade: Ogden #3; Airway Device Size: 7.5; Style: Cuffed; Cuff Inflation: Minimal occlusive pressure; Inflation Amount: 6; Placement Verified By: Auscultation, Capnometry, ETT Condensation; Grade: Grade I; Complicating Factors: None; Intubation Findings: Positive EtCO2, Bilateral breath sounds, Atraumatic/Condition of teeth unchanged;  Depth of Insertion: 22; Securment: Lips; Complications: None;     Patient Active Problem List   Diagnosis    Essential hypertension    Mixed hyperlipidemia    Traumatic amputation of fifth toe of right foot    Type 2 diabetes mellitus with hyperglycemia, with long-term current use of insulin    Diabetic ketoacidosis without coma associated with type 2 diabetes mellitus    Type 2 diabetes mellitus with left eye affected by mild nonproliferative retinopathy without macular edema, without long-term current use of insulin    Neck pain    Acute pain of left shoulder    Sepsis    Acute osteomyelitis    Leukocytosis    Anemia    Foot ulcer    Bacteremia due to group B Streptococcus    Limb pain       Review of patient's allergies  indicates:  No Known Allergies    Current Outpatient Medications:    Current Facility-Administered Medications:     0.9%  NaCl infusion, , Intravenous, Continuous, Vicente Wick MD, Last Rate: 75 mL/hr at 07/15/19 2229    acetaminophen tablet 650 mg, 650 mg, Oral, Q6H PRN, Vicente Wick MD, 650 mg at 07/14/19 2236    amLODIPine tablet 5 mg, 5 mg, Oral, Daily, Vicente Wick MD, 5 mg at 07/15/19 0834    atorvastatin tablet 80 mg, 80 mg, Oral, Daily, Vicente Wick MD, 80 mg at 07/15/19 0834    calcium carbonate 200 mg calcium (500 mg) chewable tablet 1,000 mg, 1,000 mg, Oral, BID PRN, nAtonio Tate PA-C, 1,000 mg at 07/14/19 1850    ceFAZolin (ANCEF) 6 g in dextrose 5 % 500 mL CONTINUOUS INFUSION, 6 g, Intravenous, Q24H, RAMILA Kinsey, 6 g at 07/15/19 1351    dextrose 10% (D10W) Bolus, 12.5 g, Intravenous, PRN, Vicente Wick MD    dextrose 10% (D10W) Bolus, 25 g, Intravenous, PRN, Vicente Wick MD    enoxaparin injection 40 mg, 40 mg, Subcutaneous, Daily, Vicente Wick MD, 40 mg at 07/15/19 1620    glucagon (human recombinant) injection 1 mg, 1 mg, Intramuscular, PRN, Roselyn Chow MD    glucose chewable tablet 16 g, 16 g, Oral, PRN, Roselyn Chow MD    glucose chewable tablet 24 g, 24 g, Oral, PRN, Roselyn Chow MD    hydrALAZINE tablet 25 mg, 25 mg, Oral, Q8H PRN, Vicente Wick MD, 25 mg at 07/14/19 1908    insulin aspart U-100 pen 0-4 Units, 0-4 Units, Subcutaneous, Q4H PRN, Nakul Patel MD, 1 Units at 07/16/19 0432    insulin regular (Humulin R) 100 Units in sodium chloride 0.9% 100 mL infusion, 0.6 Units/hr, Intravenous, Continuous, Roselyn Chow MD, Last Rate: 0.6 mL/hr at 07/14/19 1626, 0.6 Units/hr at 07/14/19 1626    metoclopramide HCl injection 5 mg, 5 mg, Intravenous, Q6H PRN, Vicente Wick MD, 5 mg at 07/16/19 0121    ondansetron injection 4 mg, 4 mg, Intravenous, Q6H PRN, Vicente Wick MD, 4 mg at 07/16/19 0428    sodium chloride  0.9% flush 10 mL, 10 mL, Intravenous, PRN, Vicente Wick MD    sodium chloride 0.9% flush 3 mL, 3 mL, Intravenous, PRN, Jamey Simmons Jr., MD    Past Surgical History:   Procedure Laterality Date    AMPUTATION-TOE Right 6/5/2015    Performed by [facility] Manuel [facility] at [facility] OR    AMPUTATION-TOE and Tendo achilles lengthening Right 1/19/2017    Performed by Ramirez Wilkes DPM at [facility] OR    DEBRIDEMENT, FOOT, with left 5th ray amputation Left 7/14/2019    [facility]ND [facility]    INCISION AND DRAINAGE (I&D), FOOT Right 1/16/2017    Performed by Ramirez Wilkes DPM at [facility] OR    IRRIGATION AND DEBRIDEMENT of right foot ulcer Right 7/14/2019    Per[facility]is [facility]man [facility]    RESECTION-METATARSAL HEAD Right 1/16/2017    Performed by Ramirez Wilkes DPM at [facility] OR    TOE AMPUTATION Right 06/05/2015    TOE AMPUTATION Right 01/19/2017       Social History     Socioeconomic History    Marital status: Single     Spouse name: Not on file    Number of children: 0    Years of education: Not on file    Highest education level: Not on file   Occupational History     Employer: [employer]   Social Needs    Financial resource strain: Not on file    Food insecurity:     Worry: Not on file     Inability: Not on file    Transportation needs:     Medical: Not on file     Non-medical: Not on file   Tobacco Use    Smoking status: Never Smoker    Smokeless tobacco: Never Used   Substance and Sexual Activity    Alcohol use: No    Drug use: No    Sexual activity: Yes     Partners: Female     Birth control/protection: Condom   Lifestyle    Physical activity:     Days per week: Not on file     Minutes per session: Not on file    Stress: Not on file   Relationships    Social connections:     Talks on phone: Not on file     Gets together: Not on file     Attends Jainism service: Not on file     Active member of club or organization: Not on file      Attends meetings of clubs or organizations: Not on file     Relationship status: Not on file   Other Topics Concern    Not on file   Social History Narrative    Not on file       OBJECTIVE:     Vital Signs Range (Last 24H):  Temp:  [36.7 °C (98 °F)-37.2 °C (98.9 °F)]   Pulse:  []   Resp:  [17-20]   BP: (137-175)/(79-98)   SpO2:  [92 %-98 %]       Significant Labs:  Lab Results   Component Value Date    WBC 10.87 07/16/2019    HGB 11.5 (L) 07/16/2019    HCT 37.1 (L) 07/16/2019     (H) 07/16/2019    CHOL 186 10/11/2017    TRIG 73 10/11/2017    HDL 68 10/11/2017    ALT <5 (L) 07/15/2019    AST 13 07/15/2019     07/15/2019    K 3.7 07/15/2019     07/15/2019    CREATININE 1.1 07/15/2019    BUN 7 07/15/2019    CO2 25 07/15/2019    TSH 1.136 03/01/2012    PSA 0.65 08/11/2014    INR 1.1 01/16/2017    HGBA1C >14.0 (H) 07/12/2019       Diagnostic Studies: No relevant studies.    EKG: No recent studies available.    2D ECHO:  No results found for this or any previous visit.      ASSESSMENT/PLAN:       Anesthesia Evaluation    I have reviewed the Patient Summary Reports.    I have reviewed the Nursing Notes.   I have reviewed the Medications.     Review of Systems  Anesthesia Hx:  No problems with previous Anesthesia Denies Hx of Anesthetic complications  History of prior surgery of interest to airway management or planning: Previous anesthesia: General Denies Family Hx of Anesthesia complications.   Denies Personal Hx of Anesthesia complications.   Social:  Non-Smoker, No Alcohol Use    EENT/Dental:   denies chronic allergic rhinitis  Denies Otitis Media   Cardiovascular:   Hypertension    Pulmonary:   Denies Pneumonia    Hepatic/GI:   Ulcerative colitis   Neurological:   Headaches    Endocrine:   Diabetes        Physical Exam  General:  Well nourished    Airway/Jaw/Neck:  Airway Findings: Mouth Opening: Normal Tongue: Normal  General Airway Assessment: Adult  Mallampati: III  Improves to II with  phonation.  TM Distance: Normal, at least 6 cm        Eyes/Ears/Nose:  EYES/EARS/NOSE FINDINGS: Normal   Dental:  Dental Findings: In tact   Chest/Lungs:  Chest/Lungs Clear    Heart/Vascular:  Heart Findings: Normal       Mental Status:  Mental Status Findings:  Flat Affect, Cooperative, Alert and Oriented         Anesthesia Plan  Type of Anesthesia, risks & benefits discussed:  Anesthesia Type:  MAC, general  Patient's Preference:   Intra-op Monitoring Plan: standard ASA monitors  Intra-op Monitoring Plan Comments:   Post Op Pain Control Plan: peripheral nerve block, per primary service following discharge from PACU and IV/PO Opioids PRN  Post Op Pain Control Plan Comments:   Induction:   IV  Beta Blocker:  Patient is not currently on a Beta-Blocker (No further documentation required).       Informed Consent: Patient understands risks and agrees with Anesthesia plan.  Questions answered. Anesthesia consent signed with patient.  ASA Score: 3     Day of Surgery Review of History & Physical:    H&P update referred to the surgeon.         Ready For Surgery From Anesthesia Perspective.

## 2019-07-16 NOTE — PROGRESS NOTES
"[facility]  Endocrinology  Progress Note    Admit Date: 7/12/2019     Reason for Consult: Management of T2DM, Hyperglycemia, DKA    Surgical Procedure and Date: 7/14/19    Diabetes diagnosis year: >20 years, 1990s?    Home Diabetes Medications:  Metformin 500mg BID, Lantus 26U QHS    How often checking glucose at home? None  BG readings on regimen: n/a  Hypoglycemia on the regimen?  No  Missed doses on regimen?  No    Diabetes Complications include:     Hyperglycemia and Foot ulcer      Complicating diabetes co morbidities:  HTN      HPI:   Patient is a 49 y.o. male with a diagnosis of Type 2 DM (noncomplaince, skip Levemir 1-2x a week), HTN, PVD, hx of right foot osteomyelitis who was admitted to hospital medicine for DKA and L foot ulcer. He report  4-5 days of nausea and vomiting. Vomiting is non-bloody. He has not been able to keep anything down. Pt hasn't been administering his insulin during this time due to the fact that he has not been able to keep anything down. He was found to have a large open wound on L foot, pt recently lost his insurance and has been unable to follow up with wound care. Xray showed new Osteomyelitis of L foot.  Pt report skipping his Lantus at home 1-2x a week, not on insulin with meal. Was on Trulicity but stopped due to lost of insurance. BHOB was 4.1, A1C >14, Anion gap of 21, glucose 349. Endocrine was consulted for "DKA", diabetes management.         Interval HPI: Pt currently on Insulin drip @ 0.6units/hr.  Glucose levels slightly above goals.  Pt not tolerating PO diet 2/2 nausea     Overnight events: NO overnight events   Eating:   <25%  Nausea: Yes  Hypoglycemia and intervention: No  Fever: No  TPN and/or TF: No    BP (!) 140/87 (BP Location: Left arm, Patient Position: Lying)   Pulse 101   Temp 98 °F (36.7 °C) (Oral)   Resp 18   Ht 6' 1" (1.854 m)   Wt 89.6 kg (197 lb 8.5 oz)   SpO2 (!) 94%   BMI 26.06 kg/m²      Labs Reviewed and Include    Recent " Labs   Lab 07/16/19  0757   *   CALCIUM 9.2   ALBUMIN 1.8*   PROT 6.8      K 3.4*   CO2 23      BUN 8   CREATININE 1.2   ALKPHOS 102   ALT <5*   AST 10   BILITOT 0.2     Lab Results   Component Value Date    WBC 10.87 07/16/2019    HGB 11.5 (L) 07/16/2019    HCT 37.1 (L) 07/16/2019    MCV 90 07/16/2019     (H) 07/16/2019     No results for input(s): TSH, FREET4 in the last 168 hours.  Lab Results   Component Value Date    HGBA1C >14.0 (H) 07/12/2019       Nutritional status:   Body mass index is 26.06 kg/m².  Lab Results   Component Value Date    ALBUMIN 1.8 (L) 07/16/2019    ALBUMIN 1.9 (L) 07/15/2019    ALBUMIN 1.9 (L) 07/15/2019     No results found for: PREALBUMIN    Estimated Creatinine Clearance: 82.3 mL/min (based on SCr of 1.2 mg/dL).    Accu-Checks  Recent Labs     07/14/19  1545 07/14/19  1747 07/14/19  2109 07/15/19  0533 07/15/19  0820 07/15/19  1145 07/15/19  1833 07/15/19  2323 07/16/19  0429 07/16/19  0822   POCTGLUCOSE 167* 171* 151* 149* 161* 156* 189* 198* 210* 212*       Current Medications and/or Treatments Impacting Glycemic Control  Immunotherapy:    Immunosuppressants     None        Steroids:   Hormones (From admission, onward)    None        Pressors:    Autonomic Drugs (From admission, onward)    None        Hyperglycemia/Diabetes Medications:   Antihyperglycemics (From admission, onward)    Start     Stop Route Frequency Ordered    07/15/19 1735  insulin aspart U-100 pen 0-4 Units      -- SubQ Every 4 hours PRN 07/15/19 1636    07/13/19 2045  insulin regular (Humulin R) 100 Units in sodium chloride 0.9% 100 mL infusion      -- IV Continuous 07/13/19 1937          ASSESSMENT and PLAN    * Acute osteomyelitis  - New L foot OM  - management per primary and Podiatry  - On IV Abx  -Tight glucose control (Goal 140-180)       Type 2 diabetes mellitus with hyperglycemia, with long-term current use of insulin  -Pt has poorly controlled type 2 DM w/ A1C > 14 due to  non-compliance (had insurance issues)  - DKA is Now resolved   -Glucose levels for the past 24hrs ranging between (149 - 212)   -Glucose level goal inpatient (140 -180)   -Pt currently slightly above goal on Regular insulin transition ggt @ 6units/hr   -Pt requiring correction insulin q 4hrs    -Pt not tolerating PO diet 2/2 nausea     Plan:   -Increase Insulin ggt to 0.7units/hr   -Continue Low dose correction insulin q 4hrs   -Accuchecks q4hrs   -Will switch patient to MDI once tolerating PO diet     Discharge Recommendations: TBD    Diabetic ketoacidosis without coma associated with type 2 diabetes mellitus  - DKA on admission due to noncompliance of insulin use combine with Osteomyelitis of foot  - Now resolved  -Refer to plan above     Essential hypertension  - manage per primary          Nakul Patel MD  Endocrinology  [facility]

## 2019-07-16 NOTE — PLAN OF CARE
"Problem: Adult Inpatient Plan of Care  Goal: Plan of Care Review  Outcome: Ongoing (interventions implemented as appropriate)  Patient AAo x4, calm and cooperative. 3 episodes of emesis overnight, unable to tolerate intake, PRN zofran and reglan administered, no relieve obtained. Patient stated "feels the same". Ancef antibiotics continued overnight at 20.8ml/hr. Normal saline at 75 ml.hr. Urine out put per flow sheet. BP stable, less that 160 overnight. Wound care to be done by podiatry today. Patient stable, will continue to monitor.     Problem: Diabetes Comorbidity  Goal: Blood Glucose Level Within Desired Range  Outcome: Ongoing (interventions implemented as appropriate)  Intervention: Maintain Glycemic Control  Transitional gtt continued at 0.6unit/hr. BG check q4 hours        "

## 2019-07-16 NOTE — SUBJECTIVE & OBJECTIVE
"Interval HPI: Pt currently on Insulin drip @ 0.6units/hr.  Glucose levels slightly above goals.  Pt not tolerating PO diet 2/2 nausea     Overnight events: NO overnight events   Eating:   <25%  Nausea: Yes  Hypoglycemia and intervention: No  Fever: No  TPN and/or TF: No    BP (!) 140/87 (BP Location: Left arm, Patient Position: Lying)   Pulse 101   Temp 98 °F (36.7 °C) (Oral)   Resp 18   Ht 6' 1" (1.854 m)   Wt 89.6 kg (197 lb 8.5 oz)   SpO2 (!) 94%   BMI 26.06 kg/m²     Labs Reviewed and Include    Recent Labs   Lab 07/16/19  0757   *   CALCIUM 9.2   ALBUMIN 1.8*   PROT 6.8      K 3.4*   CO2 23      BUN 8   CREATININE 1.2   ALKPHOS 102   ALT <5*   AST 10   BILITOT 0.2     Lab Results   Component Value Date    WBC 10.87 07/16/2019    HGB 11.5 (L) 07/16/2019    HCT 37.1 (L) 07/16/2019    MCV 90 07/16/2019     (H) 07/16/2019     No results for input(s): TSH, FREET4 in the last 168 hours.  Lab Results   Component Value Date    HGBA1C >14.0 (H) 07/12/2019       Nutritional status:   Body mass index is 26.06 kg/m².  Lab Results   Component Value Date    ALBUMIN 1.8 (L) 07/16/2019    ALBUMIN 1.9 (L) 07/15/2019    ALBUMIN 1.9 (L) 07/15/2019     No results found for: PREALBUMIN    Estimated Creatinine Clearance: 82.3 mL/min (based on SCr of 1.2 mg/dL).    Accu-Checks  Recent Labs     07/14/19  1545 07/14/19  1747 07/14/19  2109 07/15/19  0533 07/15/19  0820 07/15/19  1145 07/15/19  1833 07/15/19  2323 07/16/19  0429 07/16/19  0822   POCTGLUCOSE 167* 171* 151* 149* 161* 156* 189* 198* 210* 212*       Current Medications and/or Treatments Impacting Glycemic Control  Immunotherapy:    Immunosuppressants     None        Steroids:   Hormones (From admission, onward)    None        Pressors:    Autonomic Drugs (From admission, onward)    None        Hyperglycemia/Diabetes Medications:   Antihyperglycemics (From admission, onward)    Start     Stop Route Frequency Ordered    07/15/19 6411  insulin " aspart U-100 pen 0-4 Units      -- SubQ Every 4 hours PRN 07/15/19 1636    07/13/19 2045  insulin regular (Humulin R) 100 Units in sodium chloride 0.9% 100 mL infusion      -- IV Continuous 07/13/19 1937

## 2019-07-16 NOTE — PHYSICIAN QUERY
"PT Name: Indio Ryder Jr.  MR #: [id number]    Physician Query Form - Hematology Clarification      CDS: Shayy Thorne RN, CCDS         Contact information :[phone]  [email or web address]       This form is a permanent document in the medical record.      Query Date: July 16, 2019    By submitting this query, we are merely seeking further clarification of documentation. Please utilize your independent clinical judgment when addressing the question(s) below.    The Medical record contains the following:   Indicators  Supporting Clinical Findings Location in Medical Record   x "Anemia" documented "anemia" H&P 7/12/19   x H & H = H/H 12.0/37.7  H/H 11.5/37.1 Lab 7/12/19  Lab 7/16/19    BP =                     HR=      "GI bleeding" documented      Acute bleeding (Non GI site)      Transfusion(s)      Treatment:     x Other:  "Anemia [D64.9] hemoglobin at 12, normocytic.  Monitor"    "PMH of T2DM (poorly  controlled, with long term insulin use) and HTN who presented to the ED on 7/12/2019 diagnosed with  Vomiting (vomiting for past 4 days" H&P 7/12/19     Doctor, please specify diagnosis or diagnoses associated with above clinical findings.  Please document type of anemia.      [x  ] Anemia of chronic disease ( Specify chronic disease)      [  ] Other Hematological Diagnosis (please specify):     [  ] Clinically Undetermined       Please document in your progress notes daily for the duration of treatment, until resolved, and include in your discharge summary.                                                                                                      "

## 2019-07-16 NOTE — ASSESSMENT & PLAN NOTE
- DKA on admission due to noncompliance of insulin use combine with Osteomyelitis of foot  - Now resolved  -Refer to plan above

## 2019-07-16 NOTE — ASSESSMENT & PLAN NOTE
49 year old male with uncontrolled DM and HTN admitted in DKA. Also had left foot wound and Group B strep bacteremia.  Wound cultures grew GBS and MSSA. Per podiatry note, there was necrosis and purulence tracking down to bone. Pt is s/p I&D with podiatry 7/14; left 5th met head noted to be spongy. Cultures from met head with Group B strep.   - fever and leukocytosis resolved.   - ABIs no significant PAD    Plan:  1. Continue cefazolin 6 gram IV cont infusion (for group B strep and MSSA)  2. Recommend further debridement in the OR and left 5th ray amputation for source control as unlikely to heal with antibiotics alone; if amputation Is done, please send clean margins for culture and pathology; if all infection removed in the OR, anticipate 2 weeks of IV antibiotics for bacteremia from date of surgery; if there is concern for residual osteomyelitis, will need 6 weeks of IV therapy; can f/u pathology as outpatient  3. Will need PICC line prior to discharge  4. F/u on podiatry surgical note tomorrow  5. Will follow

## 2019-07-16 NOTE — ASSESSMENT & PLAN NOTE
-Pt has poorly controlled type 2 DM w/ A1C > 14 due to non-compliance (had insurance issues)  - DKA is Now resolved   -Glucose levels for the past 24hrs ranging between (149 - 212)   -Glucose level goal inpatient (140 -180)   -Pt currently slightly above goal on Regular insulin transition ggt @ 6units/hr   -Pt requiring correction insulin q 4hrs    -Pt not tolerating PO diet 2/2 nausea     Plan:   -Increase Insulin ggt to 0.7units/hr   -Continue Low dose correction insulin q 4hrs   -Accuchecks q4hrs   -Will switch patient to MDI once tolerating PO diet     Discharge Recommendations: TBD

## 2019-07-16 NOTE — PROGRESS NOTES
Progress Note  Hospital Medicine    Admit Date: 7/12/2019  Length of Stay:  LOS: 4 days     SUBJECTIVE:         Follow-up For:  Acute osteomyelitis    HPI/Interval history (See H&P for complete P,F,SHx) :   Over view  Indio Ryder Jr. is a 49 y.o. male with a PMH of T2DM (poorly  controlled, with long term insulin use) and HTN who presented to the ED on 7/12/2019 diagnosed with  Vomiting (vomiting for past 4 days, denies sick contacts)   Oriented x3 accompanied by daughter at the bedside.  Mentions that he wears lower extremity stockings for edema. Reportedly formed a new ulcer on the left lateral aspect of the foot about 10 days back.  had 4-5 days of nausea and vomiting. Vomiting is non-bloody, No abdominal pain..  It occurs immediately after eating and he has not been able to keep anything down. Associated with fevers and chills but no objective temperature measurement.  At baseline only takes insulin once a day stop taking more than a week because of nausea and vomiting and poor oral intake.  Does not check fingersticks regularly at home. recently lost his insurance and has been unable to follow up with wound care.  His last follow-up with primary care provider and podiatrist was about 2 years.  He has been managing his right foot ulcer at home with dressing but over the past 2 weeks an ulcer on his left foot has developed. He denies any pain or injury- has chronic numbness of bilateral lower extremities and hands from diabetic neuropathy.  Found to be in DKA in the emergency department with beta hydroxybutyrate elevated at 4.1.  Started on insulin drip    07/13/2019   Temperature of 101.5 last night.  Anion gap resolved- bicarbonate 23, insulin drip discontinued.  Started on Levemir 23 units q.a.m. 7 units as part t.i.d. with meals with low-dose sliding scale.  Diabetic diet and NPO from midnight for possible intervention.  MRI of the foot- Ulceration involving the lateral plantar aspect of the distal left  foot, with findings concerning for exposed bone involving the distal aspect of the 5th metatarsal and proximal phalange of the 5th toe.  There are associated changes concerning for osteomyelitis involving the distal aspect of the 5th metatarsal and entirety of the right toe.  There is osseous hyperemia involving the proximal phalange of the 2nd toe, early changes of osteomyelitis are a consideration . aerobic culture  and blood culture with Group B strep. discontinued vancomycin       07/14/2019  Had urinary retention of 800 mL but spontaneously voided.  Started on transitional insulin drip as NPO from midnight for OR today.  Hypokalemia repeat.  Blood cultures daily until clear.  Aerobic cultures with group B Streptococcus and Staph aureus.  Added vancomycin ( monitor for toxicity)    Interval history   s/p I&D and 5th ray amputation in the OR  on  07/14/2019.  ALISSA ordered to evaluate vascular status intraoperative Cultures pending . clean margin cultures and pathology pending. Deescalated from vanc and zosyn to cefazolin 6 gram IV cont infusion.  blood cultures from 07/13/2019 no growth.  Continues to have nausea vomiting since unable to tolerate anything by mouth.  Started on IV hydration and Reglan    Review of Systems: List if applicable  Constitutional: Positive for activity change, appetite change (Poor appetite for the last and), chills, fatigue, fever ( weight loss) and unexpected weight change.   HENT: Negative for congestion, ear discharge, ear pain, facial swelling and sore throat.    Eyes: Negative for pain, discharge and itching.   Respiratory: Positive for shortness of breath ( at rest and exertion ). Negative for cough, chest tightness and wheezing.    Cardiovascular: Positive for leg swelling ( chronic). Negative for chest pain and palpitations.   Gastrointestinal: Positive for vomiting. Negative for abdominal distention, abdominal pain, blood in stool, constipation and diarrhea.   Endocrine:  Negative for cold intolerance.   Genitourinary: Negative for dysuria, hematuria and urgency.   Musculoskeletal: Negative for arthralgias, gait problem, myalgias, neck pain and neck stiffness.   Skin: Negative for color change, pallor and rash.   Allergic/Immunologic: Negative for environmental allergies.   Neurological: Positive for weakness and numbness ( bilateral lower extremities and hands attributes to diabetic neuropathy). Negative for dizziness, syncope, light-headedness and headaches.   Hematological: Negative for adenopathy.   Psychiatric/Behavioral: Negative for agitation, behavioral problems and confusion.     OBJECTIVE:     Vital Signs Range (Last 24H):  Temp:  [98 °F (36.7 °C)-98.9 °F (37.2 °C)]   Pulse:  []   Resp:  [18-20]   BP: (137-175)/(79-97)   SpO2:  [92 %-97 %]     Physical Exam:  Constitutional: He is oriented to person, place, and time. He appears well-developed and well-nourished.   HENT:   Head: Normocephalic and atraumatic.   Mouth/Throat: Oropharynx is clear and moist. No oropharyngeal exudate.   Eyes: Pupils are equal, round, and reactive to light. Conjunctivae and EOM are normal. Right eye exhibits no discharge. Left eye exhibits no discharge. No scleral icterus.   Neck: Normal range of motion. Neck supple. No JVD present. No tracheal deviation present. No thyromegaly present.   Cardiovascular: Normal rate, regular rhythm and normal heart sounds. Exam reveals no gallop and no friction rub.   No murmur heard.  Pulmonary/Chest: Effort normal and breath sounds normal. No stridor. No respiratory distress. He has no wheezes. He has no rales.   Abdominal: Soft. Bowel sounds are normal. He exhibits no distension and no mass. There is no tenderness. There is no guarding.   Musculoskeletal: Normal range of motion. He exhibits edema.   Lymphadenopathy:     He has no cervical adenopathy.   Neurological: He is oriented to person, place, and time. No cranial nerve deficit.   Able to move upper  and lower extremities without limitation   Skin: Skin is warm and dry.   Left foot: 6cm ulcer with exposed subcutaneous fat and eschar with foul smelling, purulent drainage overlying lateral aspect of left 5th MTP.     Right foot: 1.5cm clean based ulcer at base of right 5th MTP joint. Right great toe and second toe surgically amputated.     Psychiatric: He has a normal mood and affect. His behavior is normal.   Nursing note and vitals reviewed    Medications:  Medication list was reviewed and changes noted under Assessment/Plan.      Current Facility-Administered Medications:     0.9%  NaCl infusion, , Intravenous, Continuous, Vicente Wick MD, Last Rate: 75 mL/hr at 07/15/19 2229    acetaminophen tablet 650 mg, 650 mg, Oral, Q6H PRN, Vicente Wick MD, 650 mg at 07/14/19 2236    amLODIPine tablet 5 mg, 5 mg, Oral, Daily, Vicente Wick MD, 5 mg at 07/16/19 0910    atorvastatin tablet 80 mg, 80 mg, Oral, Daily, Vicente Wick MD, 80 mg at 07/16/19 0910    calcium carbonate 200 mg calcium (500 mg) chewable tablet 1,000 mg, 1,000 mg, Oral, BID PRN, Antonio Tate PA-C, 1,000 mg at 07/14/19 1850    ceFAZolin (ANCEF) 6 g in dextrose 5 % 500 mL CONTINUOUS INFUSION, 6 g, Intravenous, Q24H, RAMILA Kinsey, 6 g at 07/15/19 1351    dextrose 10% (D10W) Bolus, 12.5 g, Intravenous, PRN, Vicente Wick MD    dextrose 10% (D10W) Bolus, 25 g, Intravenous, PRN, Vicente Wick MD    enoxaparin injection 40 mg, 40 mg, Subcutaneous, Daily, Vicente Wick MD, 40 mg at 07/15/19 1620    glucagon (human recombinant) injection 1 mg, 1 mg, Intramuscular, PRN, Roselyn Chow MD    glucose chewable tablet 16 g, 16 g, Oral, PRN, Roselyn Chow MD    glucose chewable tablet 24 g, 24 g, Oral, PRN, Roselyn Chow MD    hydrALAZINE tablet 25 mg, 25 mg, Oral, Q8H PRN, Vicente Wick MD, 25 mg at 07/14/19 1908    insulin aspart U-100 pen 0-4 Units, 0-4 Units, Subcutaneous, Q4H PRN, Max Betzaida Patel MD,  1 Units at 07/16/19 0824    insulin regular (Humulin R) 100 Units in sodium chloride 0.9% 100 mL infusion, 0.7 Units/hr, Intravenous, Continuous, Max Betzaida Patel MD, Last Rate: 0.6 mL/hr at 07/14/19 1626, 0.6 Units/hr at 07/14/19 1626    metoclopramide HCl injection 5 mg, 5 mg, Intravenous, Q6H PRN, Vicente Wick MD, 5 mg at 07/16/19 0121    ondansetron injection 4 mg, 4 mg, Intravenous, Q6H PRN, Vicente Wick MD, 4 mg at 07/16/19 0428    sodium chloride 0.9% flush 10 mL, 10 mL, Intravenous, PRN, Vicente Wick MD    sodium chloride 0.9% flush 3 mL, 3 mL, Intravenous, PRN, Jamey Simmons Jr., MD    acetaminophen, calcium carbonate, Dextrose 10% Bolus, Dextrose 10% Bolus, glucagon (human recombinant), glucose, glucose, hydrALAZINE, insulin aspart U-100, metoclopramide HCl, ondansetron, sodium chloride 0.9%, sodium chloride 0.9%    Laboratory/Diagnostic Data:  Reviewed and noted in plan where applicable- Please see chart for full lab data.    Recent Labs   Lab 07/14/19  0326 07/15/19  0305 07/16/19  0316   WBC 13.81* 14.29* 10.87   HGB 10.9* 10.8* 11.5*   HCT 35.4* 34.1* 37.1*    355* 351*       Recent Labs   Lab 07/12/19  1037  07/14/19  0326  07/15/19  0305 07/15/19  0749 07/15/19  1956 07/16/19  0316 07/16/19  0757   NA  --    < > 139   < >  --  139 141  --  145   K  --    < > 3.3*   < >  --  3.2* 3.7  --  3.4*   CL  --    < > 102   < >  --  100 103  --  106   CO2  --    < > 27   < >  --  25 25  --  23   BUN  --    < > 5*   < >  --  6 7  --  8   CREATININE  --    < > 0.7   < >  --  0.9 1.1  --  1.2   GLU  --    < > 134*   < >  --  162* 183*  --  209*   CALCIUM  --    < > 9.0   < >  --  8.8 8.8  --  9.2   MG 1.7   < > 1.7  --  1.5*  --   --  2.0  --    PHOS  --    < > 2.5*  --  2.6*  --   --  2.9  --    LIPASE <3*  --   --   --   --   --   --   --   --     < > = values in this interval not displayed.       Recent Labs   Lab 07/15/19  0749 07/15/19  1956 07/16/19  0757   ALKPHOS 104 106 313    ALT 5* <5* <5*   AST 9* 13 10   ALBUMIN 1.9* 1.9* 1.8*   PROT 6.9 6.9 6.8   BILITOT 0.4 0.3 0.2        Microbiology labs for the last week  Microbiology Results (last 7 days)     Procedure Component Value Units Date/Time    Blood culture [[id number]] Collected:  07/14/19 0629    Order Status:  Completed Specimen:  Blood Updated:  07/16/19 1012     Blood Culture, Routine No Growth to date      No Growth to date      No Growth to date    Aerobic culture [[id number]]  (Abnormal) Collected:  07/14/19 1728    Order Status:  Completed Specimen:  Wound from Toe, Left Foot Updated:  07/16/19 0850     Aerobic Bacterial Culture STREPTOCOCCUS AGALACTIAE (GROUP B)  Rare  Beta-hemolytic streptococci are routinely susceptible to   penicillins,cephalosporins and carbapenems.  Susceptibility testing not routinely performed  Skin janes also present      Narrative:       Left 5th metatarsal    Blood culture [[id number]] Collected:  07/15/19 0305    Order Status:  Completed Specimen:  Blood from Antecubital, Right Updated:  07/16/19 0822     Blood Culture, Routine No Growth to date      No Growth to date    Blood culture [[id number]] Collected:  07/15/19 0305    Order Status:  Completed Specimen:  Blood from Antecubital, Right Updated:  07/16/19 0822     Blood Culture, Routine No Growth to date      No Growth to date    Blood culture [[id number]] Collected:  07/13/19 0358    Order Status:  Completed Specimen:  Blood Updated:  07/16/19 0612     Blood Culture, Routine No Growth to date      No Growth to date      No Growth to date      No Growth to date    Blood culture [[id number]] Collected:  07/13/19 0358    Order Status:  Completed Specimen:  Blood Updated:  07/16/19 0612     Blood Culture, Routine No Growth to date      No Growth to date      No Growth to date      No Growth to date    Aerobic culture [[id number]]  (Abnormal)  (Susceptibility) Collected:  07/12/19 1257    Order Status:  Completed Specimen:  Wound from Foot, Left  Updated:  07/15/19 0953     Aerobic Bacterial Culture STREPTOCOCCUS AGALACTIAE (GROUP B)  Few  Beta-hemolytic streptococci are routinely susceptible to   penicillins,cephalosporins and carbapenems.  Susceptibility testing not routinely performed        STAPHYLOCOCCUS AUREUS  Few      Culture, Anaerobe [[id number]] Collected:  07/12/19 1257    Order Status:  Completed Specimen:  Wound from Foot, Left Updated:  07/15/19 0856     Anaerobic Culture Culture in progress    Culture, Anaerobe [[id number]] Collected:  07/14/19 1728    Order Status:  Completed Specimen:  Wound from Toe, Left Foot Updated:  07/15/19 0727     Anaerobic Culture Culture in progress    Narrative:       Left 5th metatarsal    Blood culture x two cultures. Draw prior to antibiotics. [[id number]]  (Abnormal) Collected:  07/12/19 1044    Order Status:  Completed Specimen:  Blood from Peripheral, Antecubital, Right Updated:  07/14/19 0947     Blood Culture, Routine Gram stain mary bottle: Gram positive cocci in chains resembling Strep      Results called to and read back by:Farrah Ayala RN 07/12/2019        22:47      Gram stain aer bottle: Gram positive cocci in chains resembling Strep       07/13/2019  06:46      STREPTOCOCCUS AGALACTIAE (GROUP B)  Beta-hemolytic streptococci are routinely susceptible to   penicillins,cephalosporins and carbapenems.  For susceptibility see order #[id number]      Narrative:       Aerobic and anaerobic    Blood culture x two cultures. Draw prior to antibiotics. [[id number]]  (Abnormal)  (Susceptibility) Collected:  07/12/19 1044    Order Status:  Completed Specimen:  Blood from Peripheral, Antecubital, Left Updated:  07/14/19 0947     Blood Culture, Routine Gram stain mary bottle: Gram positive cocci in chains resembling Strep      Results called to and read back by:Farrah Ayala RN 07/12/2019        22:46      Gram stain aer bottle: Gram positive cocci in chains resembling Strep       07/13/2019  06:43       STREPTOCOCCUS AGALACTIAE (GROUP B)  Beta-hemolytic streptococci are routinely susceptible to   penicillins,cephalosporins and carbapenems.      Narrative:       Aerobic and anaerobic           Imaging Results          MRI Foot (Forefoot) Left Without Contrast (Final result)  Result time 07/12/19 19:07:38    Final result by Bob Oglesby MD (07/12/19 19:07:38)                 Impression:      Ulceration involving the lateral plantar aspect of the distal left foot, with findings concerning for exposed bone involving the distal aspect of the 5th metatarsal and proximal phalange of the 5th toe.  There are associated changes concerning for osteomyelitis involving the distal aspect of the 5th metatarsal and entirety of the right toe.  There is osseous hyperemia involving the proximal phalange of the 2nd toe, early changes of osteomyelitis are a consideration no definite low signal on T1 at this time.      Electronically signed by: Bob Oglesby MD  Date:    07/12/2019  Time:    19:07             Narrative:    EXAMINATION:  MRI FOOT (FOREFOOT) LEFT WITHOUT CONTRAST    CLINICAL HISTORY:  Open wound of ankle, foot and toes;    TECHNIQUE:  Multiplanar multisequence MRI images of the left forefoot were obtained without administration of IV contrast.    COMPARISON:  Radiograph 07/12/2019    FINDINGS:  There is soft tissue ulceration involving the lateral plantar aspect of the right foot, noting there appears to be exposed bone in the location, likely involving the distal aspect of the 5th metatarsal head, and proximal phalange of the 5th toe. There is diffusely increased STIR signal within the distal aspect of the 5th metatarsal, and throughout the phalanges of the 5th toe. There is corresponding low T1 signal involving the same regions, concerning for osteomyelitis. There is additional abnormal STIR signal within the base of the proximal phalange of the 2nd toe, without convincing low signal on T1, suggesting osseous  hyperemia although early changes of osteomyelitis remain a consideration. There is edema and induration about the open wound, no convincing focal organized fluid collection.  The remainder of the osseous structures are grossly unremarkable. No significant joint effusions.  There is reactive edema about the intrinsic musculature of the foot, as well as along the dorsal surface.                                X-Ray Foot Complete Left (Final result)  Result time 07/12/19 11:06:16    Final result by Chandan Montana MD (07/12/19 11:06:16)                 Impression:      Abnormal examination with findings strongly suggesting osteomyelitis involving the base of the proximal phalanx of the left 5th toe and likely the head of the 5th metatarsal as well.    This report was flagged in Epic as abnormal.      Electronically signed by: Chandan Montana MD  Date:    07/12/2019  Time:    11:06             Narrative:    EXAMINATION:  XR FOOT COMPLETE 3 VIEW LEFT    TECHNIQUE:  Three views of the left foot were obtained, with AP, lateral, and oblique projections submitted.    COMPARISON:  No relevant comparison examinations are currently available.    FINDINGS:  Focal soft tissue loss/irregularity involving the region of the left 5th MTP joint is observed, with gas present within the soft tissues at this level, the findings likely related to a clinically evident ulceration.  There is focal lytic bone destruction involving the base of the proximal phalanx of the 5th toe, and possibly involving the head of the 5th metatarsal as well, this radiographic appearance strongly suggesting osteomyelitis.  The remaining osseous structures appear intact, with no evidence of recent fracture and no additional areas of focal lytic bone destruction noted.  Some arterial vascular calcification in the soft tissues about the ankle is incidentally observed.                               X-Ray Chest AP Portable (Final result)  Result time 07/12/19 10:48:19     "Final result by Chandan Montana MD (07/12/19 10:48:19)                 Impression:      No significant intrathoracic abnormality.  Allowing for differences in projection and a poorer inspiratory depth level on the current examination, there has been no significant detrimental interval change in the appearance of the chest since 05/03/2016.      Electronically signed by: Chandan Montana MD  Date:    07/12/2019  Time:    10:48             Narrative:    EXAMINATION:  XR CHEST AP PORTABLE    CLINICAL HISTORY:  hyperglycemia;    COMPARISON:  Comparison is made to the most recent prior chest radiograph, dated 05/03/2016.    FINDINGS:  Heart size is normal, as is the appearance of the pulmonary vascularity.  Lung zones are clear, and free of significant airspace consolidation or volume loss.  No pleural fluid.  No abnormal mediastinal widening.  No pneumothorax.  Incidental note is made of some spurring at the right acromioclavicular joint level.                                Estimated body mass index is 26.06 kg/m² as calculated from the following:    Height as of this encounter: 6' 1" (1.854 m).    Weight as of this encounter: 89.6 kg (197 lb 8.5 oz).    I & O (Last 24H):    Intake/Output Summary (Last 24 hours) at 7/16/2019 1036  Last data filed at 7/16/2019 0600  Gross per 24 hour   Intake 2045.65 ml   Output 1450 ml   Net 595.65 ml       Estimated Creatinine Clearance: 82.3 mL/min (based on SCr of 1.2 mg/dL).    ASSESSMENT/PLAN:     Active Problems:    Active Diagnoses:     Diagnosis Date Noted POA    PRINCIPAL PROBLEM:  Acute osteomyelitis [M86.10] x-ray left foot - strongly suggesting osteomyelitis involving the base of the proximal phalanx of the left 5th toe and likely the head of the 5th metatarsal as well.        recently lost his insurance and has been unable to follow up with wound care.  His last follow-up with primary care provider and podiatrist was about 2 years.  He has been managing his right foot ulcer at " home with dressing but over the past 2 weeks an ulcer on his left foot has developed. He denies any pain or injury- has chronic numbness of bilateral lower extremities and hands from diabetic neuropathy.  Started on IV Zosyn and vanc.  Podiatry and Infectious Disease consult.  aerobic culture with Group B strep.  Add vancomycin for Staph aureus  (monitor for toxicity)    07/14/2019   s/p I&D and 5th ray amputation in the OR  on  07/14/2019.  No weight-bearing left lower x-ray ALISSA ordered to evaluate vascular status intraoperative Cultures pending . clean margin cultures and pathology pending. Deescalated from vanc and zosyn to cefazolin 6 gram IV cont infusion.  blood cultures from 07/13/2019 no growth.  Continues to have nausea vomiting since unable to tolerate anything by mouth.  Started on IV hydration and Reglan 07/12/2019 Yes    Sepsis [A41.9] /bacteremia - blood cultures from 07/12/2019 group B Streptococcus. secondary to diabetic foot ulcer.  Elevated ESR greater than 120 As above 07/12/2019 Yes    Leukocytosis [D72.829] 14 secondary to sepsis. resolved  07/12/2019 Unknown    Anemia [D64.9] hemoglobin at 12 10.8 with IV hydration-->, normocytic.  Monitor 07/12/2019 Unknown    Type 2 diabetes mellitus with left eye affected by mild nonproliferative retinopathy without macular edema, without long-term current use of insulin [E11.3292]At baseline only takes insulin once a day stopped  taking more than a week because of nausea and vomiting and poor oral intake.  Does not check fingersticks regularly at home. recently lost his insurance.  Obtain HbA1c elevated greater than 14.  Continue insulin drip.  Endocrine evaluation 08/11/2017 Yes       Chronic    Diabetic ketoacidosis without coma associated with type 2 diabetes mellitus [E11.10]  Found to be in DKA in the emergency department with beta hydroxybutyrate elevated at 4.1.  Started on insulin drip.  Received normal saline in the emergency  department    07/13/2019  Anion gap resolved- bicarbonate 23, insulin drip discontinued.  Started on    Started on transitional insulin drip as NPO from midnight. Start Levemir 14 units qhs at  9pm and Novolog 5 units with meals       Hypokalemia-hypomagnesemia replaced    Hypophosphatemia- placed   05/30/2017 Unknown    Type 2 diabetes mellitus with diabetic neuropathy, with long-term current use of insulin [E11.40, Z79.4] 05/03/2016 Not Applicable       Chronic    Mixed hyperlipidemia [E78.2] continue with Lipitor 08/12/2014 Yes    Essential hypertension [I10] blood pressure controlled without medications.  Monitor  06/06/2013 Yes       Chronic               Disposition- home    DVT prophylaxis addressed with:  Brian Rust M.D.

## 2019-07-17 ENCOUNTER — ANESTHESIA (OUTPATIENT)
Dept: SURGERY | Facility: HOSPITAL | Age: 49
DRG: 853 | End: 2019-07-17
Payer: MEDICAID

## 2019-07-17 LAB
ALBUMIN SERPL BCP-MCNC: 1.9 G/DL (ref 3.5–5.2)
ALBUMIN SERPL BCP-MCNC: 2 G/DL (ref 3.5–5.2)
ALP SERPL-CCNC: 103 U/L (ref 55–135)
ALP SERPL-CCNC: 105 U/L (ref 55–135)
ALT SERPL W/O P-5'-P-CCNC: <5 U/L (ref 10–44)
ALT SERPL W/O P-5'-P-CCNC: <5 U/L (ref 10–44)
ANION GAP SERPL CALC-SCNC: 11 MMOL/L (ref 8–16)
ANION GAP SERPL CALC-SCNC: 11 MMOL/L (ref 8–16)
AST SERPL-CCNC: 10 U/L (ref 10–40)
AST SERPL-CCNC: 8 U/L (ref 10–40)
BASOPHILS # BLD AUTO: 0.02 K/UL (ref 0–0.2)
BASOPHILS NFR BLD: 0.2 % (ref 0–1.9)
BILIRUB SERPL-MCNC: 0.2 MG/DL (ref 0.1–1)
BILIRUB SERPL-MCNC: 0.2 MG/DL (ref 0.1–1)
BUN SERPL-MCNC: 6 MG/DL (ref 6–20)
BUN SERPL-MCNC: 7 MG/DL (ref 6–20)
CALCIUM SERPL-MCNC: 8.7 MG/DL (ref 8.7–10.5)
CALCIUM SERPL-MCNC: 9 MG/DL (ref 8.7–10.5)
CHLORIDE SERPL-SCNC: 104 MMOL/L (ref 95–110)
CHLORIDE SERPL-SCNC: 105 MMOL/L (ref 95–110)
CO2 SERPL-SCNC: 30 MMOL/L (ref 23–29)
CO2 SERPL-SCNC: 32 MMOL/L (ref 23–29)
CREAT SERPL-MCNC: 1.2 MG/DL (ref 0.5–1.4)
CREAT SERPL-MCNC: 1.4 MG/DL (ref 0.5–1.4)
DIFFERENTIAL METHOD: ABNORMAL
EOSINOPHIL # BLD AUTO: 0.1 K/UL (ref 0–0.5)
EOSINOPHIL NFR BLD: 0.8 % (ref 0–8)
ERYTHROCYTE [DISTWIDTH] IN BLOOD BY AUTOMATED COUNT: 12.9 % (ref 11.5–14.5)
EST. GFR  (AFRICAN AMERICAN): >60 ML/MIN/1.73 M^2
EST. GFR  (AFRICAN AMERICAN): >60 ML/MIN/1.73 M^2
EST. GFR  (NON AFRICAN AMERICAN): 57.8 ML/MIN/1.73 M^2
EST. GFR  (NON AFRICAN AMERICAN): >60 ML/MIN/1.73 M^2
GLUCOSE SERPL-MCNC: 192 MG/DL (ref 70–110)
GLUCOSE SERPL-MCNC: 241 MG/DL (ref 70–110)
GRAM STN SPEC: NORMAL
GRAM STN SPEC: NORMAL
HCT VFR BLD AUTO: 38.8 % (ref 40–54)
HGB BLD-MCNC: 11.7 G/DL (ref 14–18)
IMM GRANULOCYTES # BLD AUTO: 0.03 K/UL (ref 0–0.04)
IMM GRANULOCYTES NFR BLD AUTO: 0.4 % (ref 0–0.5)
LYMPHOCYTES # BLD AUTO: 1.4 K/UL (ref 1–4.8)
LYMPHOCYTES NFR BLD: 16.6 % (ref 18–48)
MAGNESIUM SERPL-MCNC: 2 MG/DL (ref 1.6–2.6)
MCH RBC QN AUTO: 27.9 PG (ref 27–31)
MCHC RBC AUTO-ENTMCNC: 30.2 G/DL (ref 32–36)
MCV RBC AUTO: 93 FL (ref 82–98)
MONOCYTES # BLD AUTO: 0.7 K/UL (ref 0.3–1)
MONOCYTES NFR BLD: 8.2 % (ref 4–15)
NEUTROPHILS # BLD AUTO: 6.2 K/UL (ref 1.8–7.7)
NEUTROPHILS NFR BLD: 73.8 % (ref 38–73)
NRBC BLD-RTO: 0 /100 WBC
PHOSPHATE SERPL-MCNC: 2.6 MG/DL (ref 2.7–4.5)
PLATELET # BLD AUTO: 312 K/UL (ref 150–350)
PMV BLD AUTO: 10.3 FL (ref 9.2–12.9)
POCT GLUCOSE: 175 MG/DL (ref 70–110)
POCT GLUCOSE: 186 MG/DL (ref 70–110)
POCT GLUCOSE: 191 MG/DL (ref 70–110)
POCT GLUCOSE: 203 MG/DL (ref 70–110)
POCT GLUCOSE: 213 MG/DL (ref 70–110)
POCT GLUCOSE: 215 MG/DL (ref 70–110)
POTASSIUM SERPL-SCNC: 3.2 MMOL/L (ref 3.5–5.1)
POTASSIUM SERPL-SCNC: 3.3 MMOL/L (ref 3.5–5.1)
PROT SERPL-MCNC: 6.9 G/DL (ref 6–8.4)
PROT SERPL-MCNC: 7.1 G/DL (ref 6–8.4)
RBC # BLD AUTO: 4.19 M/UL (ref 4.6–6.2)
SODIUM SERPL-SCNC: 145 MMOL/L (ref 136–145)
SODIUM SERPL-SCNC: 148 MMOL/L (ref 136–145)
WBC # BLD AUTO: 8.45 K/UL (ref 3.9–12.7)

## 2019-07-17 PROCEDURE — 87205 SMEAR GRAM STAIN: CPT

## 2019-07-17 PROCEDURE — 25000003 PHARM REV CODE 250: Performed by: NURSE ANESTHETIST, CERTIFIED REGISTERED

## 2019-07-17 PROCEDURE — 87076 CULTURE ANAEROBE IDENT EACH: CPT

## 2019-07-17 PROCEDURE — 36415 COLL VENOUS BLD VENIPUNCTURE: CPT

## 2019-07-17 PROCEDURE — 88307 TISSUE SPECIMEN TO PATHOLOGY - SURGERY: ICD-10-PCS | Mod: 26,,, | Performed by: PATHOLOGY

## 2019-07-17 PROCEDURE — 15275 SKIN SUB GRAFT FACE/NK/HF/G: CPT | Mod: 51,,, | Performed by: PODIATRIST

## 2019-07-17 PROCEDURE — 15275 PR SKIN SUB GRAFT FACE/NK/HF/G UP TO 100 SQCM: ICD-10-PCS | Mod: 51,,, | Performed by: PODIATRIST

## 2019-07-17 PROCEDURE — 83735 ASSAY OF MAGNESIUM: CPT

## 2019-07-17 PROCEDURE — 28002 TREATMENT OF FOOT INFECTION: CPT | Mod: 59,,, | Performed by: PODIATRIST

## 2019-07-17 PROCEDURE — 88311 TISSUE SPECIMEN TO PATHOLOGY - SURGERY: ICD-10-PCS | Mod: 26,,, | Performed by: PATHOLOGY

## 2019-07-17 PROCEDURE — 37000009 HC ANESTHESIA EA ADD 15 MINS: Performed by: PODIATRIST

## 2019-07-17 PROCEDURE — 99232 PR SUBSEQUENT HOSPITAL CARE,LEVL II: ICD-10-PCS | Mod: ,,, | Performed by: INTERNAL MEDICINE

## 2019-07-17 PROCEDURE — D9220A PRA ANESTHESIA: ICD-10-PCS | Mod: CRNA,,, | Performed by: NURSE ANESTHETIST, CERTIFIED REGISTERED

## 2019-07-17 PROCEDURE — 63600175 PHARM REV CODE 636 W HCPCS: Performed by: HOSPITALIST

## 2019-07-17 PROCEDURE — 87206 SMEAR FLUORESCENT/ACID STAI: CPT

## 2019-07-17 PROCEDURE — D9220A PRA ANESTHESIA: Mod: CRNA,,, | Performed by: NURSE ANESTHETIST, CERTIFIED REGISTERED

## 2019-07-17 PROCEDURE — 88311 DECALCIFY TISSUE: CPT | Mod: 26,,, | Performed by: PATHOLOGY

## 2019-07-17 PROCEDURE — 28810 PR AMPUTATION METATARSAL+TOE,SINGLE: ICD-10-PCS | Mod: T4,,, | Performed by: PODIATRIST

## 2019-07-17 PROCEDURE — 28810 AMPUTATION TOE & METATARSAL: CPT | Mod: T4,,, | Performed by: PODIATRIST

## 2019-07-17 PROCEDURE — 84100 ASSAY OF PHOSPHORUS: CPT

## 2019-07-17 PROCEDURE — 25000003 PHARM REV CODE 250: Performed by: PHYSICIAN ASSISTANT

## 2019-07-17 PROCEDURE — 71000015 HC POSTOP RECOV 1ST HR: Performed by: PODIATRIST

## 2019-07-17 PROCEDURE — 36000706: Performed by: PODIATRIST

## 2019-07-17 PROCEDURE — 63600175 PHARM REV CODE 636 W HCPCS: Performed by: NURSE ANESTHETIST, CERTIFIED REGISTERED

## 2019-07-17 PROCEDURE — 99233 PR SUBSEQUENT HOSPITAL CARE,LEVL III: ICD-10-PCS | Mod: ,,, | Performed by: NURSE PRACTITIONER

## 2019-07-17 PROCEDURE — 63600175 PHARM REV CODE 636 W HCPCS: Performed by: PHYSICIAN ASSISTANT

## 2019-07-17 PROCEDURE — 88307 TISSUE EXAM BY PATHOLOGIST: CPT | Mod: 26,,, | Performed by: PATHOLOGY

## 2019-07-17 PROCEDURE — 71000044 HC DOSC ROUTINE RECOVERY FIRST HOUR: Performed by: PODIATRIST

## 2019-07-17 PROCEDURE — 87116 MYCOBACTERIA CULTURE: CPT

## 2019-07-17 PROCEDURE — 25000003 PHARM REV CODE 250: Performed by: HOSPITALIST

## 2019-07-17 PROCEDURE — 99232 SBSQ HOSP IP/OBS MODERATE 35: CPT | Mod: ,,, | Performed by: INTERNAL MEDICINE

## 2019-07-17 PROCEDURE — 36000707: Performed by: PODIATRIST

## 2019-07-17 PROCEDURE — 25000003 PHARM REV CODE 250: Performed by: PODIATRIST

## 2019-07-17 PROCEDURE — 87075 CULTR BACTERIA EXCEPT BLOOD: CPT

## 2019-07-17 PROCEDURE — 37000008 HC ANESTHESIA 1ST 15 MINUTES: Performed by: PODIATRIST

## 2019-07-17 PROCEDURE — 20600001 HC STEP DOWN PRIVATE ROOM

## 2019-07-17 PROCEDURE — 88305 TISSUE SPECIMEN TO PATHOLOGY - SURGERY: ICD-10-PCS | Mod: 26,59,, | Performed by: PATHOLOGY

## 2019-07-17 PROCEDURE — 28002 PR DEEP DISSEC FOOT INFEC,1 BURSA: ICD-10-PCS | Mod: 59,,, | Performed by: PODIATRIST

## 2019-07-17 PROCEDURE — 82962 GLUCOSE BLOOD TEST: CPT | Performed by: PODIATRIST

## 2019-07-17 PROCEDURE — 80053 COMPREHEN METABOLIC PANEL: CPT

## 2019-07-17 PROCEDURE — 99233 SBSQ HOSP IP/OBS HIGH 50: CPT | Mod: ,,, | Performed by: NURSE PRACTITIONER

## 2019-07-17 PROCEDURE — 85025 COMPLETE CBC W/AUTO DIFF WBC: CPT

## 2019-07-17 PROCEDURE — D9220A PRA ANESTHESIA: Mod: ANES,,, | Performed by: ANESTHESIOLOGY

## 2019-07-17 PROCEDURE — 88305 TISSUE EXAM BY PATHOLOGIST: CPT | Mod: 26,59,, | Performed by: PATHOLOGY

## 2019-07-17 PROCEDURE — 87070 CULTURE OTHR SPECIMN AEROBIC: CPT

## 2019-07-17 PROCEDURE — 87102 FUNGUS ISOLATION CULTURE: CPT

## 2019-07-17 PROCEDURE — 88305 TISSUE EXAM BY PATHOLOGIST: CPT | Performed by: PATHOLOGY

## 2019-07-17 PROCEDURE — 88307 TISSUE EXAM BY PATHOLOGIST: CPT | Performed by: PATHOLOGY

## 2019-07-17 PROCEDURE — D9220A PRA ANESTHESIA: ICD-10-PCS | Mod: ANES,,, | Performed by: ANESTHESIOLOGY

## 2019-07-17 PROCEDURE — 27201423 OPTIME MED/SURG SUP & DEVICES STERILE SUPPLY: Performed by: PODIATRIST

## 2019-07-17 DEVICE — IMPLANTABLE DEVICE: Type: IMPLANTABLE DEVICE | Site: FOOT | Status: FUNCTIONAL

## 2019-07-17 RX ORDER — SODIUM CHLORIDE 9 MG/ML
INJECTION, SOLUTION INTRAVENOUS CONTINUOUS PRN
Status: DISCONTINUED | OUTPATIENT
Start: 2019-07-17 | End: 2019-07-17

## 2019-07-17 RX ORDER — MIDAZOLAM HYDROCHLORIDE 1 MG/ML
INJECTION, SOLUTION INTRAMUSCULAR; INTRAVENOUS
Status: DISCONTINUED | OUTPATIENT
Start: 2019-07-17 | End: 2019-07-17

## 2019-07-17 RX ORDER — BUPIVACAINE HYDROCHLORIDE 2.5 MG/ML
INJECTION, SOLUTION EPIDURAL; INFILTRATION; INTRACAUDAL
Status: DISCONTINUED | OUTPATIENT
Start: 2019-07-17 | End: 2019-07-17 | Stop reason: HOSPADM

## 2019-07-17 RX ORDER — GLYCOPYRROLATE 0.2 MG/ML
INJECTION INTRAMUSCULAR; INTRAVENOUS
Status: DISCONTINUED | OUTPATIENT
Start: 2019-07-17 | End: 2019-07-17

## 2019-07-17 RX ORDER — LIDOCAINE HCL/PF 100 MG/5ML
SYRINGE (ML) INTRAVENOUS
Status: DISCONTINUED | OUTPATIENT
Start: 2019-07-17 | End: 2019-07-17

## 2019-07-17 RX ORDER — ONDANSETRON 2 MG/ML
8 INJECTION INTRAMUSCULAR; INTRAVENOUS EVERY 8 HOURS PRN
Status: DISCONTINUED | OUTPATIENT
Start: 2019-07-17 | End: 2019-08-02 | Stop reason: HOSPADM

## 2019-07-17 RX ORDER — FENTANYL CITRATE 50 UG/ML
INJECTION, SOLUTION INTRAMUSCULAR; INTRAVENOUS
Status: DISCONTINUED | OUTPATIENT
Start: 2019-07-17 | End: 2019-07-17

## 2019-07-17 RX ORDER — PHENYLEPHRINE HYDROCHLORIDE 10 MG/ML
INJECTION INTRAVENOUS
Status: DISCONTINUED | OUTPATIENT
Start: 2019-07-17 | End: 2019-07-17

## 2019-07-17 RX ORDER — PROPOFOL 10 MG/ML
VIAL (ML) INTRAVENOUS
Status: DISCONTINUED | OUTPATIENT
Start: 2019-07-17 | End: 2019-07-17

## 2019-07-17 RX ORDER — PROPOFOL 10 MG/ML
VIAL (ML) INTRAVENOUS CONTINUOUS PRN
Status: DISCONTINUED | OUTPATIENT
Start: 2019-07-17 | End: 2019-07-17

## 2019-07-17 RX ORDER — SODIUM CHLORIDE 0.9 % (FLUSH) 0.9 %
10 SYRINGE (ML) INJECTION
Status: DISCONTINUED | OUTPATIENT
Start: 2019-07-17 | End: 2019-08-02 | Stop reason: HOSPADM

## 2019-07-17 RX ORDER — LIDOCAINE HYDROCHLORIDE AND EPINEPHRINE 10; 10 MG/ML; UG/ML
INJECTION, SOLUTION INFILTRATION; PERINEURAL
Status: DISCONTINUED | OUTPATIENT
Start: 2019-07-17 | End: 2019-07-17 | Stop reason: HOSPADM

## 2019-07-17 RX ORDER — ONDANSETRON 2 MG/ML
INJECTION INTRAMUSCULAR; INTRAVENOUS
Status: DISCONTINUED | OUTPATIENT
Start: 2019-07-17 | End: 2019-07-17

## 2019-07-17 RX ORDER — KETAMINE HCL IN 0.9 % NACL 50 MG/5 ML
SYRINGE (ML) INTRAVENOUS
Status: DISCONTINUED | OUTPATIENT
Start: 2019-07-17 | End: 2019-07-17

## 2019-07-17 RX ADMIN — PHENYLEPHRINE HYDROCHLORIDE 100 MCG: 10 INJECTION INTRAVENOUS at 11:07

## 2019-07-17 RX ADMIN — SODIUM CHLORIDE 0.7 UNITS/HR: 9 INJECTION, SOLUTION INTRAVENOUS at 10:07

## 2019-07-17 RX ADMIN — ACETAMINOPHEN 650 MG: 325 TABLET ORAL at 04:07

## 2019-07-17 RX ADMIN — SODIUM CHLORIDE: 9 INJECTION, SOLUTION INTRAVENOUS at 10:07

## 2019-07-17 RX ADMIN — Medication 10 MG: at 11:07

## 2019-07-17 RX ADMIN — PROPOFOL 100 MCG/KG/MIN: 10 INJECTION, EMULSION INTRAVENOUS at 10:07

## 2019-07-17 RX ADMIN — CEFAZOLIN 6 G: 1 INJECTION, POWDER, FOR SOLUTION INTRAMUSCULAR; INTRAVENOUS at 02:07

## 2019-07-17 RX ADMIN — INSULIN ASPART 1 UNITS: 100 INJECTION, SOLUTION INTRAVENOUS; SUBCUTANEOUS at 08:07

## 2019-07-17 RX ADMIN — ONDANSETRON 4 MG: 2 INJECTION INTRAMUSCULAR; INTRAVENOUS at 07:07

## 2019-07-17 RX ADMIN — PROPOFOL 40 MG: 10 INJECTION, EMULSION INTRAVENOUS at 10:07

## 2019-07-17 RX ADMIN — LIDOCAINE HYDROCHLORIDE 50 MG: 20 INJECTION, SOLUTION INTRAVENOUS at 10:07

## 2019-07-17 RX ADMIN — INSULIN ASPART 1 UNITS: 100 INJECTION, SOLUTION INTRAVENOUS; SUBCUTANEOUS at 04:07

## 2019-07-17 RX ADMIN — PHENYLEPHRINE HYDROCHLORIDE 100 MCG: 10 INJECTION INTRAVENOUS at 10:07

## 2019-07-17 RX ADMIN — HYDRALAZINE HYDROCHLORIDE 25 MG: 25 TABLET, FILM COATED ORAL at 02:07

## 2019-07-17 RX ADMIN — ATORVASTATIN CALCIUM 80 MG: 20 TABLET, FILM COATED ORAL at 08:07

## 2019-07-17 RX ADMIN — GLYCOPYRROLATE 0.2 MG: 0.2 INJECTION, SOLUTION INTRAMUSCULAR; INTRAVENOUS at 10:07

## 2019-07-17 RX ADMIN — FENTANYL CITRATE 25 MCG: 50 INJECTION, SOLUTION INTRAMUSCULAR; INTRAVENOUS at 10:07

## 2019-07-17 RX ADMIN — Medication 10 MG: at 10:07

## 2019-07-17 RX ADMIN — SODIUM CHLORIDE: 0.9 INJECTION, SOLUTION INTRAVENOUS at 05:07

## 2019-07-17 RX ADMIN — AMLODIPINE BESYLATE 5 MG: 5 TABLET ORAL at 08:07

## 2019-07-17 RX ADMIN — HYDRALAZINE HYDROCHLORIDE 25 MG: 25 TABLET, FILM COATED ORAL at 05:07

## 2019-07-17 RX ADMIN — FENTANYL CITRATE 25 MCG: 50 INJECTION, SOLUTION INTRAMUSCULAR; INTRAVENOUS at 11:07

## 2019-07-17 RX ADMIN — INSULIN ASPART 1 UNITS: 100 INJECTION, SOLUTION INTRAVENOUS; SUBCUTANEOUS at 12:07

## 2019-07-17 RX ADMIN — ONDANSETRON 4 MG: 2 INJECTION INTRAMUSCULAR; INTRAVENOUS at 04:07

## 2019-07-17 RX ADMIN — ONDANSETRON 4 MG: 2 INJECTION INTRAMUSCULAR; INTRAVENOUS at 10:07

## 2019-07-17 RX ADMIN — ENOXAPARIN SODIUM 40 MG: 100 INJECTION SUBCUTANEOUS at 05:07

## 2019-07-17 RX ADMIN — PROPOFOL 20 MG: 10 INJECTION, EMULSION INTRAVENOUS at 10:07

## 2019-07-17 RX ADMIN — INSULIN ASPART 1 UNITS: 100 INJECTION, SOLUTION INTRAVENOUS; SUBCUTANEOUS at 05:07

## 2019-07-17 RX ADMIN — MIDAZOLAM HYDROCHLORIDE 2 MG: 1 INJECTION, SOLUTION INTRAMUSCULAR; INTRAVENOUS at 10:07

## 2019-07-17 NOTE — ASSESSMENT & PLAN NOTE
49 year old male with uncontrolled DM and HTN admitted in DKA.  Also had left foot wound and Group B strep bacteremia.  Wound cultures grew GBS and MSSA and finegoldia.        Per podiatry note, there was necrosis and purulence tracking down to bone and patient underwent I&D with podiatry 7/14; left 5th met head noted to be spongy. Cultures from met head with Group B strep.       He underwent repeat I&D and left partial 5th ray amputation today, 7/17.   Per OP note,  purulent drainage was expressed proximal to distal from the level of the plantar aspect of the left calcaneus to the open wound on the plantar midfoot. Purulent drainage was also manually expressed from the plantar distal aspect of the forefoot.   A small portion of the distal metatarsal was sent as a clean margin to Micro and pathology.  The wound was partially closed, Neox allograft and wound Vac placed.         Repeat blood cultures remain NGTD.   ABIs without significant PAD    Plan:  1. Continue cefazolin 6 gram IV cont infusion (for group B strep and MSSA).  Finegoldia grew from debrided tissue, would not treat at this time unless grows from clean margins.   2. Will follow clean margins for culture and pathology; if all infection removed in the OR, anticipate 2 weeks of IV antibiotics for bacteremia and skin and soft tissue infection from date of surgery; if there is concern for residual osteomyelitis, will need 6 weeks of IV therapy.  Can follow up pathology as outpatient if not resulted.  3. Will need PICC line prior to discharge  4. Will follow up tomorrow with further recommendations.     Data reviewed and plan discussed with ID staff

## 2019-07-17 NOTE — PLAN OF CARE
07/17/19 0958   Post-Acute Status   Post-Acute Authorization Placement   Other Status See Comments     SW received a call from Crystal in admissions with [facility] reporting that due to patient not having insurance he will have to come out of pocket for IV antibiotic medication in the amount of 237.52 per week. SW attempted to meet with patient at bedside but he has gone to surgery and his mother was at the bedside. She informed SW that she will inform him that SW is needing to speak to him in regard to the discharge plan. ALIYAH will follow up this afternoon to discuss this matter.    Josefina Hammond, MARCIE  [facility]   [phone]

## 2019-07-17 NOTE — ASSESSMENT & PLAN NOTE
-Pt has poorly controlled type 2 DM w/ A1C > 14 due to non-compliance (had insurance issues)  - DKA is Now resolved   -Glucose levels for the past 24hrs ranging between (200-250)   -Glucose level goal inpatient (140 -180)   -Pt currently slightly above goal on Regular insulin transition ggt @ 0.7units/hr   -Pt requiring correction insulin q 4hrs    -Pt not tolerating PO diet 2/2 nausea     Plan:   -Increase Insulin ggt to 0.9units/hr   -Switch to medium dose correction insulin q 4hrs   -Accuchecks q4hrs   -Will switch patient to MDI once tolerating PO diet     Discharge Recommendations: TBD

## 2019-07-17 NOTE — PLAN OF CARE
Problem: Fall Injury Risk  Goal: Absence of Fall and Fall-Related Injury  Outcome: Ongoing (interventions implemented as appropriate)  Intervention: Identify and Manage Contributors to Fall Injury Risk     07/17/19 0307   Manage Acute Allergic Reaction   Medication Review/Management medications reviewed   Identify and Manage Contributors to Fall Injury Risk   Self-Care Promotion BADL personal objects within reach

## 2019-07-17 NOTE — PLAN OF CARE
Problem: Adult Inpatient Plan of Care  Goal: Plan of Care Review  Outcome: Ongoing (interventions implemented as appropriate)  Patient stable overnight. NPO after midnight for I&D wash out. Insulin gtt continued at 0.7 units/ hour. CBG q4 hours, additional aspart administered as needed per MD orders. Patient nauseated overnight, no relief obtained from PRN meds reglan and zofran. PRN hydralazine administered for BP. Patient free of falls and injuries. Patient denies pain.Will continue to monitor.

## 2019-07-17 NOTE — PROGRESS NOTES
Progress Note  Hospital Medicine    Admit Date: 7/12/2019  Length of Stay:  LOS: 5 days     SUBJECTIVE:         Follow-up For:  Acute osteomyelitis    HPI/Interval history (See H&P for complete P,F,SHx) :   Over view  Indio Ryder Jr. is a 49 y.o. male with a PMH of T2DM (poorly  controlled, with long term insulin use) and HTN who presented to the ED on 7/12/2019 diagnosed with  Vomiting (vomiting for past 4 days, denies sick contacts)   Oriented x3 accompanied by daughter at the bedside.  Mentions that he wears lower extremity stockings for edema. Reportedly formed a new ulcer on the left lateral aspect of the foot about 10 days back.  had 4-5 days of nausea and vomiting. Vomiting is non-bloody, No abdominal pain..  It occurs immediately after eating and he has not been able to keep anything down. Associated with fevers and chills but no objective temperature measurement.  At baseline only takes insulin once a day stop taking more than a week because of nausea and vomiting and poor oral intake.  Does not check fingersticks regularly at home. recently lost his insurance and has been unable to follow up with wound care.  His last follow-up with primary care provider and podiatrist was about 2 years.  He has been managing his right foot ulcer at home with dressing but over the past 2 weeks an ulcer on his left foot has developed. He denies any pain or injury- has chronic numbness of bilateral lower extremities and hands from diabetic neuropathy.  Found to be in DKA in the emergency department with beta hydroxybutyrate elevated at 4.1.  Started on insulin drip    07/13/2019   Temperature of 101.5 last night.  Anion gap resolved- bicarbonate 23, insulin drip discontinued.  Started on Levemir 23 units q.a.m. 7 units as part t.i.d. with meals with low-dose sliding scale.  Diabetic diet and NPO from midnight for possible intervention.  MRI of the foot- Ulceration involving the lateral plantar aspect of the distal left  foot, with findings concerning for exposed bone involving the distal aspect of the 5th metatarsal and proximal phalange of the 5th toe.  There are associated changes concerning for osteomyelitis involving the distal aspect of the 5th metatarsal and entirety of the right toe.  There is osseous hyperemia involving the proximal phalange of the 2nd toe, early changes of osteomyelitis are a consideration . aerobic culture  and blood culture with Group B strep. discontinued vancomycin       07/14/2019  Had urinary retention of 800 mL but spontaneously voided.  Started on transitional insulin drip as NPO from midnight for OR today.  Hypokalemia repeat.  Blood cultures daily until clear.  Aerobic cultures with group B Streptococcus and Staph aureus.  Added vancomycin ( monitor for toxicity)    Interval history   s/p I&D and 5th ray amputation in the OR  on  07/14/2019.  ALISSA ordered to evaluate vascular status intraoperative Cultures pending . clean margin cultures and pathology pending. Deescalated from vanc and zosyn to cefazolin 6 gram IV cont infusion.  blood cultures from 07/13/2019 no growth.  Continues to have nausea vomiting since unable to tolerate anything by mouth.  Started on IV hydration and Reglan    Review of Systems: List if applicable  Constitutional: Positive for activity change, appetite change (Poor appetite for the last and), chills, fatigue, fever ( weight loss) and unexpected weight change.   HENT: Negative for congestion, ear discharge, ear pain, facial swelling and sore throat.    Eyes: Negative for pain, discharge and itching.   Respiratory: Positive for shortness of breath ( at rest and exertion ). Negative for cough, chest tightness and wheezing.    Cardiovascular: Positive for leg swelling ( chronic). Negative for chest pain and palpitations.   Gastrointestinal: Positive for vomiting. Negative for abdominal distention, abdominal pain, blood in stool, constipation and diarrhea.   Endocrine:  Negative for cold intolerance.   Genitourinary: Negative for dysuria, hematuria and urgency.   Musculoskeletal: Negative for arthralgias, gait problem, myalgias, neck pain and neck stiffness.   Skin: Negative for color change, pallor and rash.   Allergic/Immunologic: Negative for environmental allergies.   Neurological: Positive for weakness and numbness ( bilateral lower extremities and hands attributes to diabetic neuropathy). Negative for dizziness, syncope, light-headedness and headaches.   Hematological: Negative for adenopathy.   Psychiatric/Behavioral: Negative for agitation, behavioral problems and confusion.     OBJECTIVE:     Vital Signs Range (Last 24H):  Temp:  [96.5 °F (35.8 °C)-98.2 °F (36.8 °C)]   Pulse:  []   Resp:  [16-18]   BP: (136-174)/(73-97)   SpO2:  [96 %-98 %]     Physical Exam:  Constitutional: He is oriented to person, place, and time. He appears well-developed and well-nourished.   HENT:   Head: Normocephalic and atraumatic.   Mouth/Throat: Oropharynx is clear and moist. No oropharyngeal exudate.   Eyes: Pupils are equal, round, and reactive to light. Conjunctivae and EOM are normal. Right eye exhibits no discharge. Left eye exhibits no discharge. No scleral icterus.   Neck: Normal range of motion. Neck supple. No JVD present. No tracheal deviation present. No thyromegaly present.   Cardiovascular: Normal rate, regular rhythm and normal heart sounds. Exam reveals no gallop and no friction rub.   No murmur heard.  Pulmonary/Chest: Effort normal and breath sounds normal. No stridor. No respiratory distress. He has no wheezes. He has no rales.   Abdominal: Soft. Bowel sounds are normal. He exhibits no distension and no mass. There is no tenderness. There is no guarding.   Musculoskeletal: Normal range of motion. He exhibits edema.   Lymphadenopathy:     He has no cervical adenopathy.   Neurological: He is oriented to person, place, and time. No cranial nerve deficit.   Able to move  upper and lower extremities without limitation   Skin: Skin is warm and dry.   Left foot: 6cm ulcer with exposed subcutaneous fat and eschar with foul smelling, purulent drainage overlying lateral aspect of left 5th MTP.     Right foot: 1.5cm clean based ulcer at base of right 5th MTP joint. Right great toe and second toe surgically amputated.     Psychiatric: He has a normal mood and affect. His behavior is normal.   Nursing note and vitals reviewed    Medications:  Medication list was reviewed and changes noted under Assessment/Plan.      Current Facility-Administered Medications:     0.9%  NaCl infusion, , Intravenous, Continuous, Vicente Wick MD, Last Rate: 75 mL/hr at 07/16/19 1148    acetaminophen tablet 650 mg, 650 mg, Oral, Q6H PRN, Vicente Wick MD, 650 mg at 07/17/19 0458    amLODIPine tablet 5 mg, 5 mg, Oral, Daily, Vicente Wick MD, 5 mg at 07/17/19 0819    atorvastatin tablet 80 mg, 80 mg, Oral, Daily, Vicente Wick MD, 80 mg at 07/17/19 0819    calcium carbonate 200 mg calcium (500 mg) chewable tablet 1,000 mg, 1,000 mg, Oral, BID PRN, Antonio Tate PA-C, 1,000 mg at 07/14/19 1850    ceFAZolin (ANCEF) 6 g in dextrose 5 % 500 mL CONTINUOUS INFUSION, 6 g, Intravenous, Q24H, RAMILA Kinsey, 6 g at 07/16/19 1334    dextrose 10% (D10W) Bolus, 12.5 g, Intravenous, PRN, Vicente Wick MD    dextrose 10% (D10W) Bolus, 25 g, Intravenous, PRN, Vicente Wick MD    enoxaparin injection 40 mg, 40 mg, Subcutaneous, Daily, Vicente Wick MD, 40 mg at 07/16/19 1642    glucagon (human recombinant) injection 1 mg, 1 mg, Intramuscular, PRN, Roselyn Chow MD    glucose chewable tablet 16 g, 16 g, Oral, PRN, Roselyn Chow MD    glucose chewable tablet 24 g, 24 g, Oral, PRN, Roselyn Chow MD    hydrALAZINE tablet 25 mg, 25 mg, Oral, Q8H PRN, Vicente Wick MD, 25 mg at 07/17/19 0502    insulin aspart U-100 pen 0-4 Units, 0-4 Units, Subcutaneous, Q4H PRN, Max Huston  MD Amanda, 1 Units at 07/17/19 0819    insulin regular (Humulin R) 100 Units in sodium chloride 0.9% 100 mL infusion, 0.9 Units/hr, Intravenous, Continuous, Max Betzaida Patel MD, Last Rate: 0.7 mL/hr at 07/16/19 1018, 0.7 Units/hr at 07/16/19 1018    ondansetron injection 4 mg, 4 mg, Intravenous, Q6H PRN, Vicente Wick MD, 4 mg at 07/17/19 0458    sodium chloride 0.9% flush 10 mL, 10 mL, Intravenous, PRN, Vicente Wick MD    Facility-Administered Medications Ordered in Other Encounters:     0.9%  NaCl infusion, , , Continuous PRN, Gloria Carr CRNA    fentaNYL injection, , , PRN, Gloria Carr CRNA, 25 mcg at 07/17/19 1055    glycopyrrolate injection, , , PRN, Gloria Carr CRNA, 0.2 mg at 07/17/19 1025    insulin regular (Humulin R) 100 Units in sodium chloride 0.9% 100 mL infusion, , , Continuous PRN, Gloria Carr CRNA, Last Rate: 0.7 mL/hr at 07/17/19 1004, 0.7 Units/hr at 07/17/19 1004    ketamine in 0.9 % sod chloride 100 mg/10 mL (10 mg/mL) injection, , , PRN, Gloria Carr CRNA, 10 mg at 07/17/19 1058    lidocaine (cardiac) injection, , , PRN, Gloria Carr CRNA, 50 mg at 07/17/19 1020    midazolam injection, , Intravenous, PRN, Gloria Carr CRNA, 2 mg at 07/17/19 1005    ondansetron injection, , , PRN, Gloria Carr CRNA, 4 mg at 07/17/19 1022    phenylephrine injection, , , PRN, Gloria Carr CRNA, 100 mcg at 07/17/19 1103    propofol (DIPRIVAN) 10 mg/mL infusion, , , Continuous PRN, Gloria Carr CRNA, Last Rate: 50.6 mL/hr at 07/17/19 1056, 100 mcg/kg/min at 07/17/19 1056    propofol (DIPRIVAN) 10 mg/mL infusion, , , PRN, Gloria Carr, CRNA, 20 mg at 07/17/19 1056    acetaminophen, calcium carbonate, Dextrose 10% Bolus, Dextrose 10% Bolus, glucagon (human recombinant), glucose, glucose, hydrALAZINE, insulin aspart U-100, ondansetron, sodium chloride 0.9%    Laboratory/Diagnostic Data:  Reviewed and noted in plan where applicable- Please  see chart for full lab data.    Recent Labs   Lab 07/15/19  0305 07/16/19 0316 07/17/19  0332   WBC 14.29* 10.87 8.45   HGB 10.8* 11.5* 11.7*   HCT 34.1* 37.1* 38.8*   * 351* 312       Recent Labs   Lab 07/12/19  1037  07/15/19  0305  07/16/19 0316 07/16/19 0757 07/16/19 2017 07/17/19 0332 07/17/19  0746   NA  --    < >  --    < >  --  145 147*  --  145   K  --    < >  --    < >  --  3.4* 3.5  --  3.2*   CL  --    < >  --    < >  --  106 104  --  104   CO2  --    < >  --    < >  --  23 29  --  30*   BUN  --    < >  --    < >  --  8 7  --  7   CREATININE  --    < >  --    < >  --  1.2 1.5*  --  1.4   GLU  --    < >  --    < >  --  209* 253*  --  241*   CALCIUM  --    < >  --    < >  --  9.2 9.3  --  9.0   MG 1.7   < > 1.5*  --  2.0  --   --  2.0  --    PHOS  --    < > 2.6*  --  2.9  --   --  2.6*  --    LIPASE <3*  --   --   --   --   --   --   --   --     < > = values in this interval not displayed.       Recent Labs   Lab 07/16/19 0757 07/16/19 2017 07/17/19  0746   ALKPHOS 102 115 105   ALT <5* 5* <5*   AST 10 12 8*   ALBUMIN 1.8* 2.1* 1.9*   PROT 6.8 7.6 6.9   BILITOT 0.2 0.2 0.2        Microbiology labs for the last week  Microbiology Results (last 7 days)     Procedure Component Value Units Date/Time    Blood culture [[id number]] Collected:  07/14/19 0629    Order Status:  Completed Specimen:  Blood Updated:  07/17/19 1012     Blood Culture, Routine No Growth to date      No Growth to date      No Growth to date      No Growth to date    Culture, Anaerobe [[id number]] Collected:  07/14/19 1728    Order Status:  Completed Specimen:  Wound from Toe, Left Foot Updated:  07/17/19 0957     Anaerobic Culture Culture in progress    Narrative:       Left 5th metatarsal    Blood culture [[id number]] Collected:  07/15/19 0305    Order Status:  Completed Specimen:  Blood from Antecubital, Right Updated:  07/17/19 0822     Blood Culture, Routine No Growth to date      No Growth to date      No Growth to date     Blood culture [[id number]] Collected:  07/15/19 0305    Order Status:  Completed Specimen:  Blood from Antecubital, Right Updated:  07/17/19 0822     Blood Culture, Routine No Growth to date      No Growth to date      No Growth to date    Blood culture [[id number]] Collected:  07/13/19 0358    Order Status:  Completed Specimen:  Blood Updated:  07/17/19 0612     Blood Culture, Routine No Growth to date      No Growth to date      No Growth to date      No Growth to date      No Growth to date    Blood culture [[id number]] Collected:  07/13/19 0358    Order Status:  Completed Specimen:  Blood Updated:  07/17/19 0612     Blood Culture, Routine No Growth to date      No Growth to date      No Growth to date      No Growth to date      No Growth to date    Culture, Anaerobe [[id number]]  (Abnormal) Collected:  07/12/19 1257    Order Status:  Completed Specimen:  Wound from Foot, Left Updated:  07/16/19 1436     Anaerobic Culture FINEGOLDIA MAGNA  Many      Aerobic culture [[id number]]  (Abnormal) Collected:  07/14/19 1728    Order Status:  Completed Specimen:  Wound from Toe, Left Foot Updated:  07/16/19 0850     Aerobic Bacterial Culture STREPTOCOCCUS AGALACTIAE (GROUP B)  Rare  Beta-hemolytic streptococci are routinely susceptible to   penicillins,cephalosporins and carbapenems.  Susceptibility testing not routinely performed  Skin janes also present      Narrative:       Left 5th metatarsal    Aerobic culture [[id number]]  (Abnormal)  (Susceptibility) Collected:  07/12/19 1257    Order Status:  Completed Specimen:  Wound from Foot, Left Updated:  07/15/19 0953     Aerobic Bacterial Culture STREPTOCOCCUS AGALACTIAE (GROUP B)  Few  Beta-hemolytic streptococci are routinely susceptible to   penicillins,cephalosporins and carbapenems.  Susceptibility testing not routinely performed        STAPHYLOCOCCUS AUREUS  Few      Blood culture x two cultures. Draw prior to antibiotics. [[id number]]  (Abnormal) Collected:  07/12/19 1044     Order Status:  Completed Specimen:  Blood from Peripheral, Antecubital, Right Updated:  07/14/19 0947     Blood Culture, Routine Gram stain mary bottle: Gram positive cocci in chains resembling Strep      Results called to and read back by:Farrah Ayala RN 07/12/2019        22:47      Gram stain aer bottle: Gram positive cocci in chains resembling Strep       07/13/2019  06:46      STREPTOCOCCUS AGALACTIAE (GROUP B)  Beta-hemolytic streptococci are routinely susceptible to   penicillins,cephalosporins and carbapenems.  For susceptibility see order #[id number]      Narrative:       Aerobic and anaerobic    Blood culture x two cultures. Draw prior to antibiotics. [[id number]]  (Abnormal)  (Susceptibility) Collected:  07/12/19 1044    Order Status:  Completed Specimen:  Blood from Peripheral, Antecubital, Left Updated:  07/14/19 0947     Blood Culture, Routine Gram stain mary bottle: Gram positive cocci in chains resembling Strep      Results called to and read back by:Farrah Ayala RN 07/12/2019        22:46      Gram stain aer bottle: Gram positive cocci in chains resembling Strep       07/13/2019  06:43      STREPTOCOCCUS AGALACTIAE (GROUP B)  Beta-hemolytic streptococci are routinely susceptible to   penicillins,cephalosporins and carbapenems.      Narrative:       Aerobic and anaerobic           Imaging Results          MRI Foot (Forefoot) Left Without Contrast (Final result)  Result time 07/12/19 19:07:38    Final result by Bob Oglesby MD (07/12/19 19:07:38)                 Impression:      Ulceration involving the lateral plantar aspect of the distal left foot, with findings concerning for exposed bone involving the distal aspect of the 5th metatarsal and proximal phalange of the 5th toe.  There are associated changes concerning for osteomyelitis involving the distal aspect of the 5th metatarsal and entirety of the right toe.  There is osseous hyperemia involving the proximal phalange of the 2nd  toe, early changes of osteomyelitis are a consideration no definite low signal on T1 at this time.      Electronically signed by: Bob Oglesby MD  Date:    07/12/2019  Time:    19:07             Narrative:    EXAMINATION:  MRI FOOT (FOREFOOT) LEFT WITHOUT CONTRAST    CLINICAL HISTORY:  Open wound of ankle, foot and toes;    TECHNIQUE:  Multiplanar multisequence MRI images of the left forefoot were obtained without administration of IV contrast.    COMPARISON:  Radiograph 07/12/2019    FINDINGS:  There is soft tissue ulceration involving the lateral plantar aspect of the right foot, noting there appears to be exposed bone in the location, likely involving the distal aspect of the 5th metatarsal head, and proximal phalange of the 5th toe. There is diffusely increased STIR signal within the distal aspect of the 5th metatarsal, and throughout the phalanges of the 5th toe. There is corresponding low T1 signal involving the same regions, concerning for osteomyelitis. There is additional abnormal STIR signal within the base of the proximal phalange of the 2nd toe, without convincing low signal on T1, suggesting osseous hyperemia although early changes of osteomyelitis remain a consideration. There is edema and induration about the open wound, no convincing focal organized fluid collection.  The remainder of the osseous structures are grossly unremarkable. No significant joint effusions.  There is reactive edema about the intrinsic musculature of the foot, as well as along the dorsal surface.                                X-Ray Foot Complete Left (Final result)  Result time 07/12/19 11:06:16    Final result by Chandan Montana MD (07/12/19 11:06:16)                 Impression:      Abnormal examination with findings strongly suggesting osteomyelitis involving the base of the proximal phalanx of the left 5th toe and likely the head of the 5th metatarsal as well.    This report was flagged in Epic as  abnormal.      Electronically signed by: Chandan Montana MD  Date:    07/12/2019  Time:    11:06             Narrative:    EXAMINATION:  XR FOOT COMPLETE 3 VIEW LEFT    TECHNIQUE:  Three views of the left foot were obtained, with AP, lateral, and oblique projections submitted.    COMPARISON:  No relevant comparison examinations are currently available.    FINDINGS:  Focal soft tissue loss/irregularity involving the region of the left 5th MTP joint is observed, with gas present within the soft tissues at this level, the findings likely related to a clinically evident ulceration.  There is focal lytic bone destruction involving the base of the proximal phalanx of the 5th toe, and possibly involving the head of the 5th metatarsal as well, this radiographic appearance strongly suggesting osteomyelitis.  The remaining osseous structures appear intact, with no evidence of recent fracture and no additional areas of focal lytic bone destruction noted.  Some arterial vascular calcification in the soft tissues about the ankle is incidentally observed.                               X-Ray Chest AP Portable (Final result)  Result time 07/12/19 10:48:19    Final result by Chandan Montana MD (07/12/19 10:48:19)                 Impression:      No significant intrathoracic abnormality.  Allowing for differences in projection and a poorer inspiratory depth level on the current examination, there has been no significant detrimental interval change in the appearance of the chest since 05/03/2016.      Electronically signed by: Chandan Montana MD  Date:    07/12/2019  Time:    10:48             Narrative:    EXAMINATION:  XR CHEST AP PORTABLE    CLINICAL HISTORY:  hyperglycemia;    COMPARISON:  Comparison is made to the most recent prior chest radiograph, dated 05/03/2016.    FINDINGS:  Heart size is normal, as is the appearance of the pulmonary vascularity.  Lung zones are clear, and free of significant airspace consolidation or volume loss.  No  "pleural fluid.  No abnormal mediastinal widening.  No pneumothorax.  Incidental note is made of some spurring at the right acromioclavicular joint level.                                Estimated body mass index is 24.54 kg/m² as calculated from the following:    Height as of this encounter: 6' 1" (1.854 m).    Weight as of this encounter: 84.4 kg (186 lb).    I & O (Last 24H):    Intake/Output Summary (Last 24 hours) at 7/17/2019 1113  Last data filed at 7/17/2019 0803  Gross per 24 hour   Intake 500 ml   Output 2475 ml   Net -1975 ml       Estimated Creatinine Clearance: 70.5 mL/min (based on SCr of 1.4 mg/dL).    ASSESSMENT/PLAN:     Active Problems:    Active Diagnoses:     Diagnosis Date Noted POA    PRINCIPAL PROBLEM:  Acute osteomyelitis [M86.10] x-ray left foot - strongly suggesting osteomyelitis involving the base of the proximal phalanx of the left 5th toe and likely the head of the 5th metatarsal as well.        recently lost his insurance and has been unable to follow up with wound care.  His last follow-up with primary care provider and podiatrist was about 2 years.  He has been managing his right foot ulcer at home with dressing but over the past 2 weeks an ulcer on his left foot has developed. He denies any pain or injury- has chronic numbness of bilateral lower extremities and hands from diabetic neuropathy.  Started on IV Zosyn and vanc.  Podiatry and Infectious Disease consult.  aerobic culture with Group B strep.  Add vancomycin for Staph aureus  (monitor for toxicity)    07/14/2019   s/p I&D and 5th ray amputation in the OR  on  07/14/2019.  No weight-bearing left lower x-ray ALISSA ordered to evaluate vascular status intraoperative Cultures pending . clean margin cultures and pathology pending. Deescalated from vanc and zosyn to cefazolin 6 gram IV cont infusion.  blood cultures from 07/13/2019 no growth.  Continues to have nausea vomiting since unable to tolerate anything by mouth.  Started on IV " hydration and Reglan 07/12/2019 Yes    Sepsis [A41.9] /bacteremia - blood cultures from 07/12/2019 group B Streptococcus. secondary to diabetic foot ulcer.  Elevated ESR greater than 120 As above 07/12/2019 Yes    Leukocytosis [D72.829] 14 secondary to sepsis. resolved  07/12/2019 Unknown    Anemia [D64.9] hemoglobin at 12 10.8 with IV hydration-->, normocytic.  Monitor 07/12/2019 Unknown    Type 2 diabetes mellitus with left eye affected by mild nonproliferative retinopathy without macular edema, without long-term current use of insulin [E11.3292]At baseline only takes insulin once a day stopped  taking more than a week because of nausea and vomiting and poor oral intake.  Does not check fingersticks regularly at home. recently lost his insurance.  Obtain HbA1c elevated greater than 14.  Continue insulin drip.  Endocrine evaluation 08/11/2017 Yes       Chronic    Diabetic ketoacidosis without coma associated with type 2 diabetes mellitus [E11.10]  Found to be in DKA in the emergency department with beta hydroxybutyrate elevated at 4.1.  Started on insulin drip.  Received normal saline in the emergency department    07/13/2019  Anion gap resolved- bicarbonate 23, insulin drip discontinued.  Started on    Started on transitional insulin drip as NPO from midnight. Start Levemir 14 units qhs at  9pm and Novolog 5 units with meals       Hypokalemia-hypomagnesemia replaced    Hypophosphatemia- placed   05/30/2017 Unknown    Type 2 diabetes mellitus with diabetic neuropathy, with long-term current use of insulin [E11.40, Z79.4] 05/03/2016 Not Applicable       Chronic    Mixed hyperlipidemia [E78.2] continue with Lipitor 08/12/2014 Yes    Essential hypertension [I10] blood pressure controlled without medications.  Monitor  06/06/2013 Yes       Chronic               Disposition- home    DVT prophylaxis addressed with:  Brian Rust M.D.

## 2019-07-17 NOTE — SUBJECTIVE & OBJECTIVE
Interval History: No acute events overnight.  Underwent further I&D and partial 5th ray amputation today.   Afebrile.  No leukocytosis.  Blood cultures remain negative.  Primary complaint is nausea and vomiting.       Review of Systems   Constitutional: Positive for appetite change. Negative for chills and fever.   Respiratory: Negative for cough and shortness of breath.    Cardiovascular: Negative for chest pain and leg swelling.   Gastrointestinal: Positive for nausea and vomiting. Negative for abdominal pain, constipation and diarrhea.   Genitourinary: Negative for dysuria, frequency and hematuria.   Musculoskeletal: Negative for back pain and myalgias.   Skin: Positive for color change and wound (left foot surgical wound; right foot plantar ulcer). Negative for rash.   Neurological: Negative for dizziness, light-headedness and headaches.   Psychiatric/Behavioral: Negative for agitation and behavioral problems. The patient is not nervous/anxious.      Objective:     Vital Signs (Most Recent):  Temp: 97.7 °F (36.5 °C) (07/17/19 0803)  Pulse: 95 (07/17/19 0803)  Resp: 16 (07/17/19 0803)  BP: (!) 163/95 (07/17/19 0803)  SpO2: 96 % (07/17/19 0803) Vital Signs (24h Range):  Temp:  [96.5 °F (35.8 °C)-98.2 °F (36.8 °C)] 97.7 °F (36.5 °C)  Pulse:  [] 95  Resp:  [16-18] 16  SpO2:  [96 %-98 %] 96 %  BP: (136-174)/(73-97) 163/95     Weight: 84.5 kg (186 lb 4.6 oz)  Body mass index is 24.58 kg/m².    Estimated Creatinine Clearance: 70.5 mL/min (based on SCr of 1.4 mg/dL).    Physical Exam   Constitutional: He is oriented to person, place, and time. He appears well-developed and well-nourished. No distress.   Cardiovascular: Normal rate and regular rhythm.   No murmur heard.  Pulmonary/Chest: Effort normal and breath sounds normal. No respiratory distress.   Abdominal: Soft. Bowel sounds are normal. He exhibits no distension.   Musculoskeletal: Normal range of motion. He exhibits no edema.   Dressing in place to  bilateral feet. Wound vac left foot.    Neurological: He is alert and oriented to person, place, and time.   Skin: Skin is warm and dry.   No ascending erythema or warmth.     Psychiatric: He is withdrawn. He exhibits a depressed mood.       Significant Labs:   Blood Culture:   Recent Labs   Lab 07/12/19  1044 07/13/19  0358 07/14/19  0629 07/15/19  0305   LABBLOO Gram stain mary bottle: Gram positive cocci in chains resembling Strep  Results called to and read back by:Farrah Ayala RN 07/12/2019    22:47  Gram stain aer bottle: Gram positive cocci in chains resembling Strep   07/13/2019  06:46  STREPTOCOCCUS AGALACTIAE (GROUP B)  Beta-hemolytic streptococci are routinely susceptible to   penicillins,cephalosporins and carbapenems.  For susceptibility see order #[id number]  *  Gram stain mary bottle: Gram positive cocci in chains resembling Strep  Results called to and read back by:Farrah Ayala RN 07/12/2019    22:46  Gram stain aer bottle: Gram positive cocci in chains resembling Strep   07/13/2019  06:43  STREPTOCOCCUS AGALACTIAE (GROUP B)  Beta-hemolytic streptococci are routinely susceptible to   penicillins,cephalosporins and carbapenems.  * No Growth to date  No Growth to date  No Growth to date  No Growth to date  No Growth to date  No Growth to date  No Growth to date  No Growth to date  No Growth to date  No Growth to date No Growth to date  No Growth to date  No Growth to date No Growth to date  No Growth to date  No Growth to date  No Growth to date  No Growth to date  No Growth to date     CBC:   Recent Labs   Lab 07/16/19  0316 07/17/19  0332   WBC 10.87 8.45   HGB 11.5* 11.7*   HCT 37.1* 38.8*   * 312     CMP:   Recent Labs   Lab 07/16/19  0757 07/16/19 2017 07/17/19  0746    147* 145   K 3.4* 3.5 3.2*    104 104   CO2 23 29 30*   * 253* 241*   BUN 8 7 7   CREATININE 1.2 1.5* 1.4   CALCIUM 9.2 9.3 9.0   PROT 6.8 7.6 6.9   ALBUMIN 1.8* 2.1*  1.9*   BILITOT 0.2 0.2 0.2   ALKPHOS 102 115 105   AST 10 12 8*   ALT <5* 5* <5*   ANIONGAP 16 14 11   EGFRNONAA >60.0 53.1* 57.8*     Wound Culture:   Recent Labs   Lab 07/12/19  1257 07/14/19  1728   LABAERO STREPTOCOCCUS AGALACTIAE (GROUP B)  Few  Beta-hemolytic streptococci are routinely susceptible to   penicillins,cephalosporins and carbapenems.  Susceptibility testing not routinely performed  *  STAPHYLOCOCCUS AUREUS  Few  * STREPTOCOCCUS AGALACTIAE (GROUP B)  Rare  Beta-hemolytic streptococci are routinely susceptible to   penicillins,cephalosporins and carbapenems.  Susceptibility testing not routinely performed  Skin janes also present  *       Significant Imaging: I have reviewed all pertinent imaging results/findings within the past 24 hours.

## 2019-07-17 NOTE — OP NOTE
Operative Note       Surgery Date: 7/17/2019     Surgeon(s) and Role:     * Mai Burrell DPM - Primary     * Romy Hi PGY1- Assisting    Pre-op Diagnosis:  Left foot infection [L08.9]  Sepsis, due to unspecified organism [A41.9]    Post-op Diagnosis: Post-Op Diagnosis Codes:     * Left foot infection [L08.9]     * Sepsis, due to unspecified organism [A41.9]    Procedure(s) (LRB):  DEBRIDEMENT, FOOT with partial 5th ray amputation with application of Neox Graft (Left)    Anesthesia: Local MAC    Procedure in Detail/Findings:  The patient was brought to the operating room on a stretcher and placed on the operating table in a supine position. A timeout was performed verifying the patient's identity, surgical site, allergies, and medications.  When MAC anesthesia was achieved, an ankle block was given using 20 ml of 1:1 mixture of 1% lidocaine plain and 0.25% Bupivicaine plain. The left foot was prepped and draped in a sterile manner.     Attention was then directed to the left 5th toe where a fishmouth incision was made going from dorsal to the plantar aspect of the toe. The incision was carried directly from skin to bone without undermining of the tissues using a #15 blade. Adequate bleeding was noted at the time of the skin incision. Upon inspection, the nonviable left 5th toe was already disarticulated from the MTP joint. The 5th toe was completely excised and procured to be sent to pathology.    Attention was next directed to the lateral aspect of the 5th metatarsal head which was already exposed secondary to a chronic ulcer. A key elevator was used to lift periosteum off the metatarsal. Using an oscillating saw, approximately 1cm of metatarsal head was resected at an angle to avoid excessive pressure at the stump of the metatarsal. The 5th metatarsal head was sent to pathology. The flexor and extensor tendons were grasped and excised as far proximally as possible.     Purulent drainage was  expressed proximal to distal from the level of the plantar aspect of the calcaneus to the open wound on the plantar midfoot. Purulent drainage was also manually expressed from the plantar distal aspect of the forefoot. Deep aerobic/anaerobic cultures were obtained. An incision was made from the central plantar wound 3 cm proximally to ensure complete drainage of purulence and nonviable and undermining skin, fat, fascia, subQ and tendon were extensively excised using a #15 blade and rongeurs until healthy granular tissue was appreciated. Extensive irrigation of the wound was performed via pulse lavage with normal sterile saline solution. No purulence was expressed following the irrigation. A clean rongeur was used to resect a small portion of the distal metatarsal stump to be sent for micro and path as a clean margin to rule out further infection in the bone. No further signs of tracking, abscess or infection were noted.    Partial closure of the wound proximally was achieved using 2-0 nylon suture. Neox Cord 1K allograft 8.0x3.0 cm & 6.0 x 3.0 cm was soaked in saline and cut into a lattice formation and placed directly onto a V.A.C granufoam which was then secured directly onto the open wound bed with the application of the wound vac intraop.    A sterile dressing consisting of 4 X 4s, cast padding, kerlix and ACE was applied to the left foot. The patient tolerated the procedure and anesthesia well. The patient was transported to recovery with vital signs stable and vascular status intact as well as the following post op instructions.     1. Keep dressings clean, dry and intact until podiatry followup  2. Nonweightbearing left foot  3. Ice and elevate left foot when at rest  4. Medical management to continue while inpatient.   5. Contact Podiatry for all post op follow up care and if any problems arise.        Estimated Blood Loss: 50 mL           Specimens (From admission, onward)    Start     Ordered    07/17/19  1218  Specimen to Pathology - Surgery  Once     Start Status     07/17/19 1218 Collected (07/17/19 1218) Order ID: [id number]       07/17/19 1218    07/17/19 1215  Specimen to Pathology - Surgery  Once     Start Status     07/17/19 1215 Collected (07/17/19 1216) Order ID: [id number]       07/17/19 1216    07/14/19 1716  Specimen to Pathology - Surgery  Once     Start Status     07/14/19 1716 Final result Order ID: [id number]       07/14/19 1723        Implants:   Implant Name Type Inv. Item Serial No.  Lot No. LRB No. Used   Neox ord 1K wound covering allograft   [id number] [employer]  Left 1   Neox Cord 1K allograft   [id number] [employer] NA Left 1              Disposition: PACU - hemodynamically stable.           Condition: Good    Attestation:  I was present and scrubbed for the entire procedure.

## 2019-07-17 NOTE — NURSING
Returned form recovery room visa stretcher, accompanied by transporter. AAOx4. Denies pain. Wound vac in place to L foot, which is dressed with ace bandage. Wound vac set at 125. Dressing CD&I. L foot elevated on wedge and pillows.

## 2019-07-17 NOTE — PROGRESS NOTES
Notified central monitoring pt put on cardiac monitor in rm. Telebox taken off and walked to PACU. Per PT, mom in inpt rm. No visitors in waiting rm.

## 2019-07-17 NOTE — TRANSFER OF CARE
"Anesthesia Transfer of Care Note    Patient: Indio Ryder Jr.    Procedure(s) Performed: Procedure(s) (LRB):  DEBRIDEMENT, FOOT with leftt 5th ray partial amputation with Neox Graft (Left)    Patient location: [facility]    Anesthesia Type: general    Transport from OR: Transported from OR on 6-10 L/min O2 by face mask with adequate spontaneous ventilation    Post pain: adequate analgesia    Post assessment: no apparent anesthetic complications    Post vital signs: stable    Level of consciousness: awake    Nausea/Vomiting: no nausea/vomiting    Complications: none    Transfer of care protocol was followed      Last vitals:   Visit Vitals  BP (!) 164/95 (BP Location: Left arm, Patient Position: Lying)   Pulse 92   Temp 36.4 °C (97.5 °F) (Axillary)   Resp 16   Ht 6' 1" (1.854 m)   Wt 84.4 kg (186 lb)   SpO2 98%   BMI 24.54 kg/m²     "

## 2019-07-17 NOTE — BRIEF OP NOTE
[facility]  Brief Operative Note    SUMMARY     Surgery Date: 7/17/2019     Surgeon(s) and Role:     * Mai Burrell DPM - Primary     Assisting Surgeon: LISBET Jones- Assisting    Pre-op Diagnosis:  Left foot infection [L08.9]  Sepsis, due to unspecified organism [A41.9]    Post-op Diagnosis:  Post-Op Diagnosis Codes:     * Left foot infection [L08.9]     * Sepsis, due to unspecified organism [A41.9]    Procedure(s) (LRB):  DEBRIDEMENT, FOOT with partial 5th ray amputation with application of Neox Graft (Left)    Anesthesia: Local MAC    Description of Procedure:   Left foot partial 5th ray amputation with irrigation and debridement of necrotic skin, fat, fascia, subQ, tendon, and bone. Aggressive irrigation via pulse lavage with sterile saline solution. Left 5th digit, 5th metatarsal head sent to pathology. Wound cultures taken. Clean proximal margins of 5th metatarsal sent for micro and path. Application of neox graft with application of wound vac    Description of the findings of the procedure:   Necrotic, nonviable skin, fat, fascia, subQ, tendon, and bone. Debridement was performed until healthy granular tissue was appreciated. Purulent drainage was expressed proximally from the left heel and distally from the forefoot. No drainage noted following aggressive irrigation with pulse lavage with sterile saline solution.     Estimated Blood Loss: * 40 mL    Specimens:   Specimen (12h ago, onward)    None

## 2019-07-17 NOTE — PROGRESS NOTES
"[facility]  Endocrinology  Progress Note    Admit Date: 7/12/2019     Reason for Consult: Management of T2DM, Hyperglycemia, DKA    Surgical Procedure and Date: 7/14/19    Diabetes diagnosis year: >20 years, 1990s?    Home Diabetes Medications:  Metformin 500mg BID, Lantus 26U QHS    How often checking glucose at home? None  BG readings on regimen: n/a  Hypoglycemia on the regimen?  No  Missed doses on regimen?  No    Diabetes Complications include:     Hyperglycemia and Foot ulcer      Complicating diabetes co morbidities:  HTN      HPI:   Patient is a 49 y.o. male with a diagnosis of Type 2 DM (noncomplaince, skip Levemir 1-2x a week), HTN, PVD, hx of right foot osteomyelitis who was admitted to hospital medicine for DKA and L foot ulcer. He report  4-5 days of nausea and vomiting. Vomiting is non-bloody. He has not been able to keep anything down. Pt hasn't been administering his insulin during this time due to the fact that he has not been able to keep anything down. He was found to have a large open wound on L foot, pt recently lost his insurance and has been unable to follow up with wound care. Xray showed new Osteomyelitis of L foot.  Pt report skipping his Lantus at home 1-2x a week, not on insulin with meal. Was on Trulicity but stopped due to lost of insurance. BHOB was 4.1, A1C >14, Anion gap of 21, glucose 349. Endocrine was consulted for "DKA", diabetes management.         Interval HPI: Pt currently on Insulin drip @ 0.7units/hr.  Glucose levels slightly above goals.  Pt not tolerating PO diet 2/2 nausea     Overnight events: NO overnight events   Eating:   <25%  Nausea: Yes  Hypoglycemia and intervention: No  Fever: No  TPN and/or TF: No    BP (!) 164/95 (BP Location: Left arm, Patient Position: Lying)   Pulse 92   Temp 97.5 °F (36.4 °C) (Axillary)   Resp 16   Ht 6' 1" (1.854 m)   Wt 84.4 kg (186 lb)   SpO2 98%   BMI 24.54 kg/m²      Labs Reviewed and Include    Recent Labs "   Lab 07/17/19  0746   *   CALCIUM 9.0   ALBUMIN 1.9*   PROT 6.9      K 3.2*   CO2 30*      BUN 7   CREATININE 1.4   ALKPHOS 105   ALT <5*   AST 8*   BILITOT 0.2     Lab Results   Component Value Date    WBC 8.45 07/17/2019    HGB 11.7 (L) 07/17/2019    HCT 38.8 (L) 07/17/2019    MCV 93 07/17/2019     07/17/2019     No results for input(s): TSH, FREET4 in the last 168 hours.  Lab Results   Component Value Date    HGBA1C >14.0 (H) 07/12/2019       Nutritional status:   Body mass index is 24.54 kg/m².  Lab Results   Component Value Date    ALBUMIN 1.9 (L) 07/17/2019    ALBUMIN 2.1 (L) 07/16/2019    ALBUMIN 1.8 (L) 07/16/2019     No results found for: PREALBUMIN    Estimated Creatinine Clearance: 70.5 mL/min (based on SCr of 1.4 mg/dL).    Accu-Checks  Recent Labs     07/15/19  1833 07/15/19  2323 07/16/19  0429 07/16/19  0822 07/16/19  1301 07/16/19  1639 07/16/19  2048 07/17/19  0039 07/17/19  0439 07/17/19  0807   POCTGLUCOSE 189* 198* 210* 212* 238* 207* 259* 215* 203* 213*       Current Medications and/or Treatments Impacting Glycemic Control  Immunotherapy:    Immunosuppressants     None        Steroids:   Hormones (From admission, onward)    None        Pressors:    Autonomic Drugs (From admission, onward)    None        Hyperglycemia/Diabetes Medications:   Antihyperglycemics (From admission, onward)    Start     Stop Route Frequency Ordered    07/15/19 1735  insulin aspart U-100 pen 0-4 Units      -- SubQ Every 4 hours PRN 07/15/19 1636    07/13/19 2045  insulin regular (Humulin R) 100 Units in sodium chloride 0.9% 100 mL infusion      -- IV Continuous 07/13/19 1937          ASSESSMENT and PLAN    * Acute osteomyelitis  - New L foot OM  - management per primary and Podiatry  - On IV Abx  -Tight glucose control (Goal 140-180)       Type 2 diabetes mellitus with hyperglycemia, with long-term current use of insulin  -Pt has poorly controlled type 2 DM w/ A1C > 14 due to non-compliance  (had insurance issues)  - DKA is Now resolved   -Glucose levels for the past 24hrs ranging between (200-250)   -Glucose level goal inpatient (140 -180)   -Pt currently slightly above goal on Regular insulin transition ggt @ 0.7units/hr   -Pt requiring correction insulin q 4hrs    -Pt not tolerating PO diet 2/2 nausea     Plan:   -Increase Insulin ggt to 0.9units/hr   -Switch to medium dose correction insulin q 4hrs   -Accuchecks q4hrs   -Will switch patient to MDI once tolerating PO diet     Discharge Recommendations: TBD        Nakul Patel MD  Endocrinology  [facility]

## 2019-07-17 NOTE — SUBJECTIVE & OBJECTIVE
"Interval HPI: Pt currently on Insulin drip @ 0.7units/hr.  Glucose levels slightly above goals.  Pt not tolerating PO diet 2/2 nausea     Overnight events: NO overnight events   Eating:   <25%  Nausea: Yes  Hypoglycemia and intervention: No  Fever: No  TPN and/or TF: No    BP (!) 164/95 (BP Location: Left arm, Patient Position: Lying)   Pulse 92   Temp 97.5 °F (36.4 °C) (Axillary)   Resp 16   Ht 6' 1" (1.854 m)   Wt 84.4 kg (186 lb)   SpO2 98%   BMI 24.54 kg/m²     Labs Reviewed and Include    Recent Labs   Lab 07/17/19  0746   *   CALCIUM 9.0   ALBUMIN 1.9*   PROT 6.9      K 3.2*   CO2 30*      BUN 7   CREATININE 1.4   ALKPHOS 105   ALT <5*   AST 8*   BILITOT 0.2     Lab Results   Component Value Date    WBC 8.45 07/17/2019    HGB 11.7 (L) 07/17/2019    HCT 38.8 (L) 07/17/2019    MCV 93 07/17/2019     07/17/2019     No results for input(s): TSH, FREET4 in the last 168 hours.  Lab Results   Component Value Date    HGBA1C >14.0 (H) 07/12/2019       Nutritional status:   Body mass index is 24.54 kg/m².  Lab Results   Component Value Date    ALBUMIN 1.9 (L) 07/17/2019    ALBUMIN 2.1 (L) 07/16/2019    ALBUMIN 1.8 (L) 07/16/2019     No results found for: PREALBUMIN    Estimated Creatinine Clearance: 70.5 mL/min (based on SCr of 1.4 mg/dL).    Accu-Checks  Recent Labs     07/15/19  1833 07/15/19  2323 07/16/19  0429 07/16/19  0822 07/16/19  1301 07/16/19  1639 07/16/19  2048 07/17/19  0039 07/17/19  0439 07/17/19  0807   POCTGLUCOSE 189* 198* 210* 212* 238* 207* 259* 215* 203* 213*       Current Medications and/or Treatments Impacting Glycemic Control  Immunotherapy:    Immunosuppressants     None        Steroids:   Hormones (From admission, onward)    None        Pressors:    Autonomic Drugs (From admission, onward)    None        Hyperglycemia/Diabetes Medications:   Antihyperglycemics (From admission, onward)    Start     Stop Route Frequency Ordered    07/15/19 6021  insulin aspart " U-100 pen 0-4 Units      -- SubQ Every 4 hours PRN 07/15/19 1636    07/13/19 2045  insulin regular (Humulin R) 100 Units in sodium chloride 0.9% 100 mL infusion      -- IV Continuous 07/13/19 1937

## 2019-07-18 LAB
ALBUMIN SERPL BCP-MCNC: 2 G/DL (ref 3.5–5.2)
ALBUMIN SERPL BCP-MCNC: 2.1 G/DL (ref 3.5–5.2)
ALP SERPL-CCNC: 112 U/L (ref 55–135)
ALP SERPL-CCNC: 99 U/L (ref 55–135)
ALT SERPL W/O P-5'-P-CCNC: <5 U/L (ref 10–44)
ALT SERPL W/O P-5'-P-CCNC: <5 U/L (ref 10–44)
ANION GAP SERPL CALC-SCNC: 10 MMOL/L (ref 8–16)
ANION GAP SERPL CALC-SCNC: 13 MMOL/L (ref 8–16)
AST SERPL-CCNC: 10 U/L (ref 10–40)
AST SERPL-CCNC: 14 U/L (ref 10–40)
BACTERIA BLD CULT: NORMAL
BACTERIA BLD CULT: NORMAL
BACTERIA SPEC ANAEROBE CULT: ABNORMAL
BASOPHILS # BLD AUTO: 0.01 K/UL (ref 0–0.2)
BASOPHILS NFR BLD: 0.1 % (ref 0–1.9)
BILIRUB SERPL-MCNC: 0.2 MG/DL (ref 0.1–1)
BILIRUB SERPL-MCNC: 0.2 MG/DL (ref 0.1–1)
BUN SERPL-MCNC: 5 MG/DL (ref 6–20)
BUN SERPL-MCNC: 6 MG/DL (ref 6–20)
CALCIUM SERPL-MCNC: 8.7 MG/DL (ref 8.7–10.5)
CALCIUM SERPL-MCNC: 8.7 MG/DL (ref 8.7–10.5)
CHLORIDE SERPL-SCNC: 103 MMOL/L (ref 95–110)
CHLORIDE SERPL-SCNC: 107 MMOL/L (ref 95–110)
CO2 SERPL-SCNC: 30 MMOL/L (ref 23–29)
CO2 SERPL-SCNC: 31 MMOL/L (ref 23–29)
CREAT SERPL-MCNC: 1 MG/DL (ref 0.5–1.4)
CREAT SERPL-MCNC: 1.1 MG/DL (ref 0.5–1.4)
DIFFERENTIAL METHOD: ABNORMAL
EOSINOPHIL # BLD AUTO: 0.1 K/UL (ref 0–0.5)
EOSINOPHIL NFR BLD: 0.7 % (ref 0–8)
ERYTHROCYTE [DISTWIDTH] IN BLOOD BY AUTOMATED COUNT: 12.8 % (ref 11.5–14.5)
EST. GFR  (AFRICAN AMERICAN): >60 ML/MIN/1.73 M^2
EST. GFR  (AFRICAN AMERICAN): >60 ML/MIN/1.73 M^2
EST. GFR  (NON AFRICAN AMERICAN): >60 ML/MIN/1.73 M^2
EST. GFR  (NON AFRICAN AMERICAN): >60 ML/MIN/1.73 M^2
GLUCOSE SERPL-MCNC: 138 MG/DL (ref 70–110)
GLUCOSE SERPL-MCNC: 156 MG/DL (ref 70–110)
HCT VFR BLD AUTO: 33.1 % (ref 40–54)
HGB BLD-MCNC: 10.1 G/DL (ref 14–18)
IMM GRANULOCYTES # BLD AUTO: 0.03 K/UL (ref 0–0.04)
IMM GRANULOCYTES NFR BLD AUTO: 0.4 % (ref 0–0.5)
LYMPHOCYTES # BLD AUTO: 1.5 K/UL (ref 1–4.8)
LYMPHOCYTES NFR BLD: 18.5 % (ref 18–48)
MAGNESIUM SERPL-MCNC: 1.7 MG/DL (ref 1.6–2.6)
MCH RBC QN AUTO: 27.8 PG (ref 27–31)
MCHC RBC AUTO-ENTMCNC: 30.5 G/DL (ref 32–36)
MCV RBC AUTO: 91 FL (ref 82–98)
MONOCYTES # BLD AUTO: 0.6 K/UL (ref 0.3–1)
MONOCYTES NFR BLD: 7.5 % (ref 4–15)
NEUTROPHILS # BLD AUTO: 5.9 K/UL (ref 1.8–7.7)
NEUTROPHILS NFR BLD: 72.8 % (ref 38–73)
NRBC BLD-RTO: 0 /100 WBC
PHOSPHATE SERPL-MCNC: 2.8 MG/DL (ref 2.7–4.5)
PLATELET # BLD AUTO: 332 K/UL (ref 150–350)
PMV BLD AUTO: 10 FL (ref 9.2–12.9)
POCT GLUCOSE: 123 MG/DL (ref 70–110)
POCT GLUCOSE: 150 MG/DL (ref 70–110)
POCT GLUCOSE: 155 MG/DL (ref 70–110)
POCT GLUCOSE: 156 MG/DL (ref 70–110)
POCT GLUCOSE: 163 MG/DL (ref 70–110)
POCT GLUCOSE: 164 MG/DL (ref 70–110)
POTASSIUM SERPL-SCNC: 3 MMOL/L (ref 3.5–5.1)
POTASSIUM SERPL-SCNC: 3.2 MMOL/L (ref 3.5–5.1)
PROT SERPL-MCNC: 6.8 G/DL (ref 6–8.4)
PROT SERPL-MCNC: 7.1 G/DL (ref 6–8.4)
RBC # BLD AUTO: 3.63 M/UL (ref 4.6–6.2)
SODIUM SERPL-SCNC: 146 MMOL/L (ref 136–145)
SODIUM SERPL-SCNC: 148 MMOL/L (ref 136–145)
WBC # BLD AUTO: 8.12 K/UL (ref 3.9–12.7)

## 2019-07-18 PROCEDURE — 63600175 PHARM REV CODE 636 W HCPCS: Performed by: PHYSICIAN ASSISTANT

## 2019-07-18 PROCEDURE — 99232 PR SUBSEQUENT HOSPITAL CARE,LEVL II: ICD-10-PCS | Mod: ,,, | Performed by: INTERNAL MEDICINE

## 2019-07-18 PROCEDURE — 80053 COMPREHEN METABOLIC PANEL: CPT

## 2019-07-18 PROCEDURE — 36415 COLL VENOUS BLD VENIPUNCTURE: CPT

## 2019-07-18 PROCEDURE — 84100 ASSAY OF PHOSPHORUS: CPT

## 2019-07-18 PROCEDURE — 25000003 PHARM REV CODE 250: Performed by: HOSPITALIST

## 2019-07-18 PROCEDURE — 83735 ASSAY OF MAGNESIUM: CPT

## 2019-07-18 PROCEDURE — 99232 SBSQ HOSP IP/OBS MODERATE 35: CPT | Mod: ,,, | Performed by: INTERNAL MEDICINE

## 2019-07-18 PROCEDURE — 20600001 HC STEP DOWN PRIVATE ROOM

## 2019-07-18 PROCEDURE — 63600175 PHARM REV CODE 636 W HCPCS: Performed by: HOSPITALIST

## 2019-07-18 PROCEDURE — 85025 COMPLETE CBC W/AUTO DIFF WBC: CPT

## 2019-07-18 PROCEDURE — 25000003 PHARM REV CODE 250: Performed by: PHYSICIAN ASSISTANT

## 2019-07-18 RX ADMIN — ENOXAPARIN SODIUM 40 MG: 100 INJECTION SUBCUTANEOUS at 04:07

## 2019-07-18 RX ADMIN — CEFAZOLIN 6 G: 1 INJECTION, POWDER, FOR SOLUTION INTRAMUSCULAR; INTRAVENOUS at 01:07

## 2019-07-18 RX ADMIN — SODIUM CHLORIDE: 0.9 INJECTION, SOLUTION INTRAVENOUS at 10:07

## 2019-07-18 RX ADMIN — ATORVASTATIN CALCIUM 80 MG: 20 TABLET, FILM COATED ORAL at 08:07

## 2019-07-18 RX ADMIN — AMLODIPINE BESYLATE 5 MG: 5 TABLET ORAL at 08:07

## 2019-07-18 RX ADMIN — PROMETHAZINE HYDROCHLORIDE 12.5 MG: 25 INJECTION INTRAMUSCULAR; INTRAVENOUS at 12:07

## 2019-07-18 NOTE — ASSESSMENT & PLAN NOTE
Mr. Ryder is a 49 y.o M who presented to ED with bilateral foot ulcers and constitutional symptoms of nausea and vomiting. Right plantar foot ulcer remains stable. Continue betadine wet to dry dressings right foot. Left foot ulcer was infected upon evaluation in the ED. Pt s/p I&D (DOS:7/14), s/p partial 5th ray amputation with allograft application and wound vac application (DOS: 7/17/19)  Podiatry will continue to monitor.

## 2019-07-18 NOTE — PLAN OF CARE
07/18/19 1522   Post-Acute Status   Post-Acute Authorization Placement   Other Status See Comments     SW contacted Poppy ext [phone] with Medicaid and she reports that patient medicaid application was completed yesterday. She reports that she will call back with more information in regard to patient. Patient is in need of iv antiobiotics, home health and a wound vac post discharge. Patient does not have insurance at this time.    Josefina Hammond LMSW  [facility]   [phone]

## 2019-07-18 NOTE — PROGRESS NOTES
[facility]  Podiatry  Progress Note    Patient Name: Indio Ryder Jr.  MRN: [id number]  Admission Date: 7/12/2019  Hospital Length of Stay: 6 days  Attending Physician: Luis Rust MD  Primary Care Provider: Lorena Thakur MD     Subjective:     Interval History:   Patient seen at bedside s/p left foot partial 5th ray amputation with irrigation and debridement and application of graft. No acute distress. Denies any pedal pain. Denies calf pain. No new complaints. Dressings to left foot clean, dry, intact. Wound vac to left foot wound intact running @ 125 mmHg continuous pressure. Admits to nausea since ED admission, no vomiting, fever, chills.     Follow-up For: Procedure(s) (LRB):  DEBRIDEMENT, FOOT with leftt 5th ray partial amputation with Neox Graft (Left)    Post-Operative Day: 1 Day Post-Op    Scheduled Meds:   amLODIPine  5 mg Oral Daily    atorvastatin  80 mg Oral Daily    ceFAZolin(ANCEF) in D5W 500 mL CONTINUOUS INFUSION  6 g Intravenous Q24H    enoxaparin  40 mg Subcutaneous Daily     Continuous Infusions:   sodium chloride 0.9% 75 mL/hr at 07/17/19 1719    insulin (HUMAN R) infusion (adults) 0.4 Units/hr (07/18/19 0802)     PRN Meds:acetaminophen, calcium carbonate, Dextrose 10% Bolus, Dextrose 10% Bolus, glucagon (human recombinant), glucose, glucose, hydrALAZINE, insulin aspart U-100, ondansetron, promethazine (PHENERGAN) IVPB, sodium chloride 0.9%, sodium chloride 0.9%    Review of Systems   Constitutional: Positive for appetite change. Negative for chills and fever.   Eyes: Negative for visual disturbance.   Respiratory: Negative for cough and shortness of breath.    Cardiovascular: Negative for chest pain and leg swelling.   Gastrointestinal: Positive for nausea and vomiting. Negative for abdominal pain, constipation and diarrhea.   Genitourinary: Negative for dysuria, frequency and hematuria.   Musculoskeletal: Negative for back pain and myalgias.   Skin: Positive  for color change and wound (left foot surgical wound; right foot plantar ulcer). Negative for rash.   Neurological: Negative for dizziness, light-headedness and headaches.   Psychiatric/Behavioral: Negative for agitation and behavioral problems. The patient is not nervous/anxious.      Objective:     Vital Signs (Most Recent):  Temp: 98.2 °F (36.8 °C) (07/18/19 1138)  Pulse: (!) 116 (07/18/19 1205)  Resp: 18 (07/18/19 1138)  BP: (!) 163/97 (07/18/19 1138)  SpO2: 97 % (07/18/19 1138) Vital Signs (24h Range):  Temp:  [96.9 °F (36.1 °C)-98.7 °F (37.1 °C)] 98.2 °F (36.8 °C)  Pulse:  [] 116  Resp:  [15-18] 18  SpO2:  [95 %-97 %] 97 %  BP: (136-170)/(68-99) 163/97     Weight: 84.4 kg (186 lb)  Body mass index is 24.54 kg/m².    Foot Exam    General  Orientation: alert and oriented to person, place, and time       Right Foot/Ankle     Inspection and Palpation  Tenderness: none   Swelling: none   Skin Exam: ulcer;     Neurovascular  Dorsalis pedis: 1+  Posterior tibial: 1+  Saphenous nerve sensation: diminished  Tibial nerve sensation: diminished  Superficial peroneal nerve sensation: diminished  Deep peroneal nerve sensation: diminished  Sural nerve sensation: diminished      Left Foot/Ankle      Inspection and Palpation  Ecchymosis: none  Tenderness: none   Swelling: none   Skin Exam: drainage, skin changes and ulcer; (surgical wound 2/2 partial 5th ray amp with irrigation and debridement; proximal plantar sutures remain intact; serosanguinous drainage noted in wound vac cannister; no edema, no erythema, no signs of infection noted)    Neurovascular  Dorsalis pedis: 1+  Posterior tibial: 1+  Saphenous nerve sensation: diminished  Tibial nerve sensation: diminished  Superficial peroneal nerve sensation: diminished  Deep peroneal nerve sensation: diminished  Sural nerve sensation: diminished            Laboratory:  A1C:   Recent Labs   Lab 07/12/19  1023   HGBA1C >14.0*     CBC:   Recent Labs   Lab 07/18/19  0416    WBC 8.12   RBC 3.63*   HGB 10.1*   HCT 33.1*      MCV 91   MCH 27.8   MCHC 30.5*     CRP:   Recent Labs   Lab 07/12/19  1037   CRP 95.8*     ESR:   Recent Labs   Lab 07/12/19  1037   SEDRATE >120*     Wound Cultures:   Recent Labs   Lab 07/12/19  1257 07/14/19  1728 07/17/19  1214   LABAERO STREPTOCOCCUS AGALACTIAE (GROUP B)  Few  Beta-hemolytic streptococci are routinely susceptible to   penicillins,cephalosporins and carbapenems.  Susceptibility testing not routinely performed  *  STAPHYLOCOCCUS AUREUS  Few  * STREPTOCOCCUS AGALACTIAE (GROUP B)  Rare  Beta-hemolytic streptococci are routinely susceptible to   penicillins,cephalosporins and carbapenems.  Susceptibility testing not routinely performed  Skin janes also present  * No growth     Microbiology Results (last 7 days)     Procedure Component Value Units Date/Time    Culture, Anaerobe [[id number]]  (Abnormal) Collected:  07/14/19 1728    Order Status:  Completed Specimen:  Wound from Toe, Left Foot Updated:  07/18/19 1407     Anaerobic Culture PEPTONIPHILUS HAREI  Capri      Narrative:       Left 5th metatarsal    AFB Culture & Smear [[id number]] Collected:  07/17/19 1214    Order Status:  Completed Specimen:  Wound from Foot, Left Updated:  07/18/19 1317     AFB CULTURE STAIN No acid fast bacilli seen.    Narrative:       Left foot wound    Blood culture [[id number]] Collected:  07/14/19 0629    Order Status:  Completed Specimen:  Blood Updated:  07/18/19 1012     Blood Culture, Routine No Growth to date      No Growth to date      No Growth to date      No Growth to date      No Growth to date    Culture, Anaerobe [[id number]] Collected:  07/17/19 1214    Order Status:  Completed Specimen:  Wound from Foot, Left Updated:  07/18/19 0910     Anaerobic Culture Culture in progress    Narrative:       Left foot wound    Blood culture [[id number]] Collected:  07/15/19 0305    Order Status:  Completed Specimen:  Blood from Antecubital, Right Updated:  07/18/19  0822     Blood Culture, Routine No Growth to date      No Growth to date      No Growth to date      No Growth to date    Blood culture [[id number]] Collected:  07/15/19 0305    Order Status:  Completed Specimen:  Blood from Antecubital, Right Updated:  07/18/19 0822     Blood Culture, Routine No Growth to date      No Growth to date      No Growth to date      No Growth to date    Aerobic culture [[id number]] Collected:  07/17/19 1214    Order Status:  Completed Specimen:  Wound from Foot, Left Updated:  07/18/19 0635     Aerobic Bacterial Culture No growth    Narrative:       Left foot wound    Blood culture [[id number]] Collected:  07/13/19 0358    Order Status:  Completed Specimen:  Blood Updated:  07/18/19 0612     Blood Culture, Routine No growth after 5 days.    Blood culture [[id number]] Collected:  07/13/19 0358    Order Status:  Completed Specimen:  Blood Updated:  07/18/19 0612     Blood Culture, Routine No growth after 5 days.    Culture, Anaerobe [[id number]] Collected:  07/17/19 1659    Order Status:  Sent Specimen:  Tissue from Foot, Left Updated:  07/17/19 1659    Aerobic culture [[id number]] Collected:  07/17/19 1659    Order Status:  Sent Specimen:  Tissue from Foot, Left Updated:  07/17/19 1659    Fungus culture [[id number]] Collected:  07/17/19 1659    Order Status:  Sent Specimen:  Tissue from Foot, Left Updated:  07/17/19 1659    Gram stain [[id number]] Collected:  07/17/19 1659    Order Status:  Sent Specimen:  Tissue from Foot, Left Updated:  07/17/19 1659    AFB Culture & Smear [[id number]] Collected:  07/17/19 1659    Order Status:  Sent Specimen:  Tissue from Foot, Left Updated:  07/17/19 1659    Gram stain [[id number]] Collected:  07/17/19 1214    Order Status:  Completed Specimen:  Wound from Foot, Left Updated:  07/17/19 1403     Gram Stain Result No WBC's      No organisms seen    Narrative:       Left foot wound    Fungus culture [[id number]] Collected:  07/17/19 1214    Order Status:  Sent  Specimen:  Wound from Foot, Left Updated:  07/17/19 1228    Culture, Anaerobe [[id number]]  (Abnormal) Collected:  07/12/19 1257    Order Status:  Completed Specimen:  Wound from Foot, Left Updated:  07/16/19 1436     Anaerobic Culture FINEGOLDIA MAGNA  Many      Aerobic culture [[id number]]  (Abnormal) Collected:  07/14/19 1728    Order Status:  Completed Specimen:  Wound from Toe, Left Foot Updated:  07/16/19 0850     Aerobic Bacterial Culture STREPTOCOCCUS AGALACTIAE (GROUP B)  Rare  Beta-hemolytic streptococci are routinely susceptible to   penicillins,cephalosporins and carbapenems.  Susceptibility testing not routinely performed  Skin janes also present      Narrative:       Left 5th metatarsal    Aerobic culture [[id number]]  (Abnormal)  (Susceptibility) Collected:  07/12/19 1257    Order Status:  Completed Specimen:  Wound from Foot, Left Updated:  07/15/19 0953     Aerobic Bacterial Culture STREPTOCOCCUS AGALACTIAE (GROUP B)  Few  Beta-hemolytic streptococci are routinely susceptible to   penicillins,cephalosporins and carbapenems.  Susceptibility testing not routinely performed        STAPHYLOCOCCUS AUREUS  Few      Blood culture x two cultures. Draw prior to antibiotics. [[id number]]  (Abnormal) Collected:  07/12/19 1044    Order Status:  Completed Specimen:  Blood from Peripheral, Antecubital, Right Updated:  07/14/19 0947     Blood Culture, Routine Gram stain mary bottle: Gram positive cocci in chains resembling Strep      Results called to and read back by:Farrah Ayala RN 07/12/2019        22:47      Gram stain aer bottle: Gram positive cocci in chains resembling Strep       07/13/2019  06:46      STREPTOCOCCUS AGALACTIAE (GROUP B)  Beta-hemolytic streptococci are routinely susceptible to   penicillins,cephalosporins and carbapenems.  For susceptibility see order #[id number]      Narrative:       Aerobic and anaerobic    Blood culture x two cultures. Draw prior to antibiotics. [[id number]]  (Abnormal)   (Susceptibility) Collected:  07/12/19 1044    Order Status:  Completed Specimen:  Blood from Peripheral, Antecubital, Left Updated:  07/14/19 0947     Blood Culture, Routine Gram stain mary bottle: Gram positive cocci in chains resembling Strep      Results called to and read back by:Farrah Ayala RN 07/12/2019        22:46      Gram stain aer bottle: Gram positive cocci in chains resembling Strep       07/13/2019  06:43      STREPTOCOCCUS AGALACTIAE (GROUP B)  Beta-hemolytic streptococci are routinely susceptible to   penicillins,cephalosporins and carbapenems.      Narrative:       Aerobic and anaerobic        Specimen (12h ago, onward)    None          Diagnostic Results:  I have reviewed all pertinent imaging results/findings within the past 24 hours.    Clinical Findings:  Left foot with wound vac application intact; Running at 125 mmHg continuous pressure  Serosanguinous drainage noted in wound vac cannister; No erythema, no edema, no signs of infection    7/18      7/16      7/15 s/p I&D      7/12 post ED bedside debridement      7/12 pre ED bedside debridement          Assessment/Plan:     * Acute osteomyelitis  Indio Ryder . is a 49 y.o. male  S/P Debridement left foot wound (DOS: 7/14 per Dr. 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 Almonte).    - Wound vac application was applied in the OR yesterday, will continue wound vac at 125 mmHg continuous pressure while inpatient. Podiatry will change wound vac   - pending clean margins and cultures taken intraop for proper abx coverage per ID, appreciate reccs  - continue with IV Cefazolin while inpatient per ID recs  - Discussed with Medicine Team & SW on d/c plans. Patient has no insurance and will have to pay for IV Abx treatment, home health, and wound vac out of pocket. SW will discuss d/c plans with patient. If we are unable to get home health/wound  vac for patient upon discharge, we will discontinue application of wound vac prior to d/c  - Non weight bearing left foot.      Foot ulcer  Mr. Ryder is a 49 y.o M who presented to ED with bilateral foot ulcers and constitutional symptoms of nausea and vomiting. Right plantar foot ulcer remains stable. Continue betadine wet to dry dressings right foot. Left foot ulcer was infected upon evaluation in the ED. Pt s/p I&D (DOS:7/14), s/p partial 5th ray amputation with allograft application and wound vac application (DOS: 7/17/19)  Podiatry will continue to monitor.    Anemia  Managed per hospital medicine    Sepsis  As above    Type 2 diabetes mellitus with left eye affected by mild nonproliferative retinopathy without macular edema, without long-term current use of insulin  Managed per hospital medicine    Diabetic ketoacidosis without coma associated with type 2 diabetes mellitus  Managed per hospital medicine & endocrinology     Type 2 diabetes mellitus with hyperglycemia, with long-term current use of insulin  Managed by hospital medicine/endo team    Mixed hyperlipidemia  Managed per hospital medicine    Essential hypertension  Per primary        Romy Tricia Hi MD  Podiatry  [facility]

## 2019-07-18 NOTE — PLAN OF CARE
Problem: Adult Inpatient Plan of Care  Goal: Plan of Care Review  Outcome: Ongoing (interventions implemented as appropriate)  Patient AAO x4, calm and cooperative. No acute events overnight. Insulin gtt continued at 0.9units /hr. BG less than 180 overnight. PRN promethazine administered for nausea. Moderate relief obtained. IV fluids infusing.  Antibiotics ancef infusing overnight. Patient denies pain. Wound vac in place. Output per flow sheet patient stable will continue to monitor.

## 2019-07-18 NOTE — ASSESSMENT & PLAN NOTE
Indio Ryder Jr. is a 49 y.o. male  S/P Debridement left foot wound (DOS: 7/14 per Dr. 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 Almonte).    - Wound vac application was applied in the OR yesterday, will continue wound vac at 125 mmHg continuous pressure while inpatient. Podiatry will change wound vac   - pending clean margins and cultures taken intraop for proper abx coverage per ID, appreciate reccs  - Discussed with Medicine Team & SW on d/c plans. Patient has no insurance and will have to pay for IV Abx treatment, home health, and wound vac out of pocket. SW will discuss d/c plans with patient. If we are unable to get home health/wound vac for patient upon discharge, we will discontinue application of wound vac prior to d/c  - Non weight bearing left foot.

## 2019-07-18 NOTE — ASSESSMENT & PLAN NOTE
-Pt has poorly controlled type 2 DM w/ A1C > 14 due to non-compliance (had insurance issues)  - DKA resolved   -Glucose levels for the past 24hrs ranging between (130-190)   -Glucose level goal inpatient (140 -180)   -Pt currently @goal on Insulin ggt 0.4unit/hr   -PO intake Improving, Eating 25% on PO diet     Plan:   -C/w Insulin ggt to 0.4units/hr  -C/w low dose correction insulin qAC and HS   -Accuchecks qAC and HS   -Will switch patient to MDI once tolerating PO diet     Discharge Recommendations: TBD

## 2019-07-18 NOTE — PROGRESS NOTES
[facility]  Infectious Disease  Progress Note    Patient Name: Indio Ryder Jr.  MRN: [id number]  Admission Date: 7/12/2019  Length of Stay: 5 days  Attending Physician: Luis Rust MD  Primary Care Provider: Lorena Thakur MD    Isolation Status: No active isolations  Assessment/Plan:      Bacteremia due to group B Streptococcus     49 year old male with uncontrolled DM and HTN admitted in DKA.  Also had left foot wound and Group B strep bacteremia.  Wound cultures grew GBS and MSSA and finegoldia.        Per podiatry note, there was necrosis and purulence tracking down to bone and patient underwent I&D with podiatry 7/14; left 5th met head noted to be spongy. Cultures from met head with Group B strep.       He underwent repeat I&D and left partial 5th ray amputation today, 7/17.   Per OP note,  purulent drainage was expressed proximal to distal from the level of the plantar aspect of the left calcaneus to the open wound on the plantar midfoot. Purulent drainage was also manually expressed from the plantar distal aspect of the forefoot.   A small portion of the distal metatarsal was sent as a clean margin to Micro and pathology.  The wound was partially closed, Neox allograft and wound Vac placed.         Repeat blood cultures remain NGTD.   ABIs without significant PAD    Plan:  1. Continue cefazolin 6 gram IV cont infusion (for group B strep and MSSA).  Finegoldia grew from debrided tissue, would not treat at this time unless grows from clean margins.   2. Will follow clean margins for culture and pathology; if all infection removed in the OR, anticipate 2 weeks of IV antibiotics for bacteremia and skin and soft tissue infection from date of surgery; if there is concern for residual osteomyelitis, will need 6 weeks of IV therapy.  Can follow up pathology as outpatient if not resulted.  3. Will need PICC line prior to discharge  4. Will follow up tomorrow with further recommendations.      Data reviewed and plan discussed with ID staff      Thank you.   Please call for any questions or concerns.  JESUS Harper, ANP-C  [phone]  Subjective:     Principal Problem:Acute osteomyelitis    HPI: This is a 49 year old male with a PMH of DM (poorly controlled, with long term insulin use) and HTN who presented to the ED with nausea, vomiting, and foot ulcer. He reports 4-5 days of nausea and vomiting. Denies abdominal pain.  he also has associated chills but has not taken his temperature.  He has been managing his right foot ulcer at home with dressing but over the past 2 weeks an ulcer on his left foot has developed. He denies any pain or injury- has chronic numbness of bilateral lower extremities and hands from diabetic neuropathy.  Found to be in DKA in the emergency department and started on insulin drip. Per podiatry, the wound was necrotic with fluctuance and purulence down to bone. Patient febrile to 101.4 with leukocytosis of 14,000. His blood cultures now positive for group B strep. Wound culture also with GBS. Pt on empiric vancomycin and zosyn. ID consulted for recs.     Patient reports he still is feeling bad. No more vomiting. Family at bedside.     Interval History: No acute events overnight.  Underwent further I&D and partial 5th ray amputation today.   Afebrile.  No leukocytosis.  Blood cultures remain negative.  Primary complaint is nausea and vomiting.       Review of Systems   Constitutional: Positive for appetite change. Negative for chills and fever.   Respiratory: Negative for cough and shortness of breath.    Cardiovascular: Negative for chest pain and leg swelling.   Gastrointestinal: Positive for nausea and vomiting. Negative for abdominal pain, constipation and diarrhea.   Genitourinary: Negative for dysuria, frequency and hematuria.   Musculoskeletal: Negative for back pain and myalgias.   Skin: Positive for color change and wound (left foot surgical wound; right foot plantar  ulcer). Negative for rash.   Neurological: Negative for dizziness, light-headedness and headaches.   Psychiatric/Behavioral: Negative for agitation and behavioral problems. The patient is not nervous/anxious.      Objective:     Vital Signs (Most Recent):  Temp: 97.7 °F (36.5 °C) (07/17/19 0803)  Pulse: 95 (07/17/19 0803)  Resp: 16 (07/17/19 0803)  BP: (!) 163/95 (07/17/19 0803)  SpO2: 96 % (07/17/19 0803) Vital Signs (24h Range):  Temp:  [96.5 °F (35.8 °C)-98.2 °F (36.8 °C)] 97.7 °F (36.5 °C)  Pulse:  [] 95  Resp:  [16-18] 16  SpO2:  [96 %-98 %] 96 %  BP: (136-174)/(73-97) 163/95     Weight: 84.5 kg (186 lb 4.6 oz)  Body mass index is 24.58 kg/m².    Estimated Creatinine Clearance: 70.5 mL/min (based on SCr of 1.4 mg/dL).    Physical Exam   Constitutional: He is oriented to person, place, and time. He appears well-developed and well-nourished. No distress.   Cardiovascular: Normal rate and regular rhythm.   No murmur heard.  Pulmonary/Chest: Effort normal and breath sounds normal. No respiratory distress.   Abdominal: Soft. Bowel sounds are normal. He exhibits no distension.   Musculoskeletal: Normal range of motion. He exhibits no edema.   Dressing in place to bilateral feet. Wound vac left foot.    Neurological: He is alert and oriented to person, place, and time.   Skin: Skin is warm and dry.   No ascending erythema or warmth.     Psychiatric: He is withdrawn. He exhibits a depressed mood.       Significant Labs:   Blood Culture:   Recent Labs   Lab 07/12/19  1044 07/13/19  0358 07/14/19  0629 07/15/19  0305   LABBLOO Gram stain mary bottle: Gram positive cocci in chains resembling Strep  Results called to and read back by:Farrah Ayala RN 07/12/2019    22:47  Gram stain aer bottle: Gram positive cocci in chains resembling Strep   07/13/2019  06:46  STREPTOCOCCUS AGALACTIAE (GROUP B)  Beta-hemolytic streptococci are routinely susceptible to   penicillins,cephalosporins and carbapenems.  For  susceptibility see order #[id number]  *  Gram stain mary bottle: Gram positive cocci in chains resembling Strep  Results called to and read back by:Farrah Ayala RN 07/12/2019    22:46  Gram stain aer bottle: Gram positive cocci in chains resembling Strep   07/13/2019  06:43  STREPTOCOCCUS AGALACTIAE (GROUP B)  Beta-hemolytic streptococci are routinely susceptible to   penicillins,cephalosporins and carbapenems.  * No Growth to date  No Growth to date  No Growth to date  No Growth to date  No Growth to date  No Growth to date  No Growth to date  No Growth to date  No Growth to date  No Growth to date No Growth to date  No Growth to date  No Growth to date No Growth to date  No Growth to date  No Growth to date  No Growth to date  No Growth to date  No Growth to date     CBC:   Recent Labs   Lab 07/16/19  0316 07/17/19  0332   WBC 10.87 8.45   HGB 11.5* 11.7*   HCT 37.1* 38.8*   * 312     CMP:   Recent Labs   Lab 07/16/19  0757 07/16/19  2017 07/17/19  0746    147* 145   K 3.4* 3.5 3.2*    104 104   CO2 23 29 30*   * 253* 241*   BUN 8 7 7   CREATININE 1.2 1.5* 1.4   CALCIUM 9.2 9.3 9.0   PROT 6.8 7.6 6.9   ALBUMIN 1.8* 2.1* 1.9*   BILITOT 0.2 0.2 0.2   ALKPHOS 102 115 105   AST 10 12 8*   ALT <5* 5* <5*   ANIONGAP 16 14 11   EGFRNONAA >60.0 53.1* 57.8*     Wound Culture:   Recent Labs   Lab 07/12/19  1257 07/14/19  1728   LABAERO STREPTOCOCCUS AGALACTIAE (GROUP B)  Few  Beta-hemolytic streptococci are routinely susceptible to   penicillins,cephalosporins and carbapenems.  Susceptibility testing not routinely performed  *  STAPHYLOCOCCUS AUREUS  Few  * STREPTOCOCCUS AGALACTIAE (GROUP B)  Rare  Beta-hemolytic streptococci are routinely susceptible to   penicillins,cephalosporins and carbapenems.  Susceptibility testing not routinely performed  Skin janes also present  *       Significant Imaging: I have reviewed all pertinent imaging results/findings within the past  24 hours.

## 2019-07-18 NOTE — SUBJECTIVE & OBJECTIVE
Interval History: No acute events overnight.  POD#1 partial 5th ray amputation.    Afebrile.  No leukocytosis.  Blood cultures remain negative.  Primary complaint is nausea, though somewhat better today.      Review of Systems   Constitutional: Positive for appetite change. Negative for chills and fever.   Respiratory: Negative for cough and shortness of breath.    Cardiovascular: Negative for chest pain and leg swelling.   Gastrointestinal: Positive for nausea. Negative for abdominal pain, constipation, diarrhea and vomiting.   Genitourinary: Negative for dysuria, frequency and hematuria.   Musculoskeletal: Negative for back pain and myalgias.   Skin: Positive for color change and wound (left foot surgical wound; right foot plantar ulcer). Negative for rash.   Neurological: Negative for dizziness, light-headedness and headaches.   Psychiatric/Behavioral: Negative for agitation and behavioral problems. The patient is not nervous/anxious.      Objective:     Vital Signs (Most Recent):  Temp: 97.8 °F (36.6 °C) (07/18/19 0719)  Pulse: 98 (07/18/19 0809)  Resp: 15 (07/18/19 0719)  BP: 136/86 (07/18/19 0719)  SpO2: 95 % (07/18/19 0719) Vital Signs (24h Range):  Temp:  [96.9 °F (36.1 °C)-98.7 °F (37.1 °C)] 97.8 °F (36.6 °C)  Pulse:  [] 98  Resp:  [11-18] 15  SpO2:  [95 %-100 %] 95 %  BP: (136-175)/() 136/86     Weight: 84.4 kg (186 lb)  Body mass index is 24.54 kg/m².    Estimated Creatinine Clearance: 89.8 mL/min (based on SCr of 1.1 mg/dL).    Physical Exam   Constitutional: He is oriented to person, place, and time. He appears well-developed and well-nourished. No distress.   Cardiovascular: Normal rate and regular rhythm.   No murmur heard.  Pulmonary/Chest: Effort normal and breath sounds normal. No respiratory distress.   Abdominal: Soft. Bowel sounds are normal. He exhibits no distension.   Musculoskeletal: Normal range of motion. He exhibits no edema.   Dressing in place to bilateral feet. Wound vac  left foot.    Neurological: He is alert and oriented to person, place, and time.   Skin: Skin is warm and dry.   No ascending erythema or warmth.     Psychiatric: He is withdrawn. He exhibits a depressed mood.       Significant Labs:   Blood Culture:   Recent Labs   Lab 07/12/19  1044 07/13/19  0358 07/14/19  0629 07/15/19  0305   LABBLOO Gram stain mary bottle: Gram positive cocci in chains resembling Strep  Results called to and read back by:Farrah Ayala RN 07/12/2019    22:47  Gram stain aer bottle: Gram positive cocci in chains resembling Strep   07/13/2019  06:46  STREPTOCOCCUS AGALACTIAE (GROUP B)  Beta-hemolytic streptococci are routinely susceptible to   penicillins,cephalosporins and carbapenems.  For susceptibility see order #[id number]  *  Gram stain mary bottle: Gram positive cocci in chains resembling Strep  Results called to and read back by:Farrah Ayala RN 07/12/2019    22:46  Gram stain aer bottle: Gram positive cocci in chains resembling Strep   07/13/2019  06:43  STREPTOCOCCUS AGALACTIAE (GROUP B)  Beta-hemolytic streptococci are routinely susceptible to   penicillins,cephalosporins and carbapenems.  * No growth after 5 days.  No growth after 5 days. No Growth to date  No Growth to date  No Growth to date  No Growth to date No Growth to date  No Growth to date  No Growth to date  No Growth to date  No Growth to date  No Growth to date  No Growth to date  No Growth to date     CBC:   Recent Labs   Lab 07/17/19  0332 07/18/19  0416   WBC 8.45 8.12   HGB 11.7* 10.1*   HCT 38.8* 33.1*    332     CMP:   Recent Labs   Lab 07/17/19  0746 07/17/19  1942 07/18/19  0805    148* 148*   K 3.2* 3.3* 3.0*    105 107   CO2 30* 32* 31*   * 192* 138*   BUN 7 6 6   CREATININE 1.4 1.2 1.1   CALCIUM 9.0 8.7 8.7   PROT 6.9 7.1 6.8   ALBUMIN 1.9* 2.0* 2.0*   BILITOT 0.2 0.2 0.2   ALKPHOS 105 103 99   AST 8* 10 10   ALT <5* <5* <5*   ANIONGAP 11 11 10   EGFRNONAA  57.8* >60.0 >60.0     Wound Culture:   Recent Labs   Lab 07/12/19  1257 07/14/19  1728 07/17/19  1214   LABAERO STREPTOCOCCUS AGALACTIAE (GROUP B)  Few  Beta-hemolytic streptococci are routinely susceptible to   penicillins,cephalosporins and carbapenems.  Susceptibility testing not routinely performed  *  STAPHYLOCOCCUS AUREUS  Few  * STREPTOCOCCUS AGALACTIAE (GROUP B)  Rare  Beta-hemolytic streptococci are routinely susceptible to   penicillins,cephalosporins and carbapenems.  Susceptibility testing not routinely performed  Skin janes also present  * No growth       Significant Imaging: I have reviewed all pertinent imaging results/findings within the past 24 hours.

## 2019-07-18 NOTE — PROGRESS NOTES
"[facility]  Endocrinology  Progress Note    Admit Date: 2019     Reason for Consult: Management of T2DM, Hyperglycemia, DKA    Surgical Procedure and Date: 19    Diabetes diagnosis year: >20 years, 1990s?    Home Diabetes Medications:  Metformin 500mg BID, Lantus 26U QHS    How often checking glucose at home? None  BG readings on regimen: n/a  Hypoglycemia on the regimen?  No  Missed doses on regimen?  No    Diabetes Complications include:     Hyperglycemia and Foot ulcer      Complicating diabetes co morbidities:  HTN      HPI:   Patient is a 49 y.o. male with a diagnosis of Type 2 DM (noncomplaince, skip Levemir 1-2x a week), HTN, PVD, hx of right foot osteomyelitis who was admitted to hospital medicine for DKA and L foot ulcer. He report  4-5 days of nausea and vomiting. Vomiting is non-bloody. He has not been able to keep anything down. Pt hasn't been administering his insulin during this time due to the fact that he has not been able to keep anything down. He was found to have a large open wound on L foot, pt recently lost his insurance and has been unable to follow up with wound care. Xray showed new Osteomyelitis of L foot.  Pt report skipping his Lantus at home 1-2x a week, not on insulin with meal. Was on Trulicity but stopped due to lost of insurance. BHOB was 4.1, A1C >14, Anion gap of 21, glucose 349. Endocrine was consulted for "DKA", diabetes management.         Interval HPI:  Pt currently on Insulin transition ggt 0.4units/hr for the past 8hrs.  Glucose levels ranging between (130-190). Pt tolerating liquid diet 25-50%.    Overnight events: No overnight events   Eatin%  Nausea: No  Hypoglycemia and intervention: No  Fever: No  TPN and/or TF: No    BP (!) 142/83 (BP Location: Right arm, Patient Position: Lying)   Pulse 94   Temp 98.4 °F (36.9 °C) (Oral)   Resp 18   Ht 6' 1" (1.854 m)   Wt 84.4 kg (186 lb)   SpO2 (!) 94%   BMI 24.54 kg/m²      Labs Reviewed " and Include    Recent Labs   Lab 07/18/19  0805   *   CALCIUM 8.7   ALBUMIN 2.0*   PROT 6.8   *   K 3.0*   CO2 31*      BUN 6   CREATININE 1.1   ALKPHOS 99   ALT <5*   AST 10   BILITOT 0.2     Lab Results   Component Value Date    WBC 8.12 07/18/2019    HGB 10.1 (L) 07/18/2019    HCT 33.1 (L) 07/18/2019    MCV 91 07/18/2019     07/18/2019     No results for input(s): TSH, FREET4 in the last 168 hours.  Lab Results   Component Value Date    HGBA1C >14.0 (H) 07/12/2019       Nutritional status:   Body mass index is 24.54 kg/m².  Lab Results   Component Value Date    ALBUMIN 2.0 (L) 07/18/2019    ALBUMIN 2.0 (L) 07/17/2019    ALBUMIN 1.9 (L) 07/17/2019     No results found for: PREALBUMIN    Estimated Creatinine Clearance: 89.8 mL/min (based on SCr of 1.1 mg/dL).    Accu-Checks  Recent Labs     07/17/19  0039 07/17/19  0439 07/17/19  0807 07/17/19  1230 07/17/19  1638 07/17/19  2042 07/18/19  0003 07/18/19  0409 07/18/19  0758 07/18/19  1217   POCTGLUCOSE 215* 203* 213* 175* 191* 186* 164* 163* 123* 150*       Current Medications and/or Treatments Impacting Glycemic Control  Immunotherapy:    Immunosuppressants     None        Steroids:   Hormones (From admission, onward)    None        Pressors:    Autonomic Drugs (From admission, onward)    None        Hyperglycemia/Diabetes Medications:   Antihyperglycemics (From admission, onward)    Start     Stop Route Frequency Ordered    07/15/19 1735  insulin aspart U-100 pen 0-4 Units      -- SubQ Every 4 hours PRN 07/15/19 1636    07/13/19 2045  insulin regular (Humulin R) 100 Units in sodium chloride 0.9% 100 mL infusion      -- IV Continuous 07/13/19 1937          ASSESSMENT and PLAN    Type 2 diabetes mellitus with hyperglycemia, with long-term current use of insulin  -Pt has poorly controlled type 2 DM w/ A1C > 14 due to non-compliance (had insurance issues)  - DKA resolved   -Glucose levels for the past 24hrs ranging between (130-190)    -Glucose level goal inpatient (140 -180)   -Pt currently @goal on Insulin ggt 0.4unit/hr   -PO intake Improving, Eating 25% on PO diet     Plan:   -C/w Insulin ggt to 0.4units/hr  -C/w low dose correction insulin qAC and HS   -Accuchecks qAC and HS   -Will switch patient to MDI once tolerating PO diet     Discharge Recommendations: TBD    Diabetic ketoacidosis without coma associated with type 2 diabetes mellitus  - DKA on admission due to noncompliance of insulin use combine with Osteomyelitis of foot  - Now resolved  -Refer to plan above     Essential hypertension  - manage per primary          Nakul Patel MD  Endocrinology  [facility]

## 2019-07-18 NOTE — SUBJECTIVE & OBJECTIVE
"Interval HPI:  Pt currently on Insulin transition ggt 0.4units/hr for the past 8hrs.  Glucose levels ranging between (130-190). Pt tolerating liquid diet 25-50%.    Overnight events: No overnight events   Eatin%  Nausea: No  Hypoglycemia and intervention: No  Fever: No  TPN and/or TF: No    BP (!) 142/83 (BP Location: Right arm, Patient Position: Lying)   Pulse 94   Temp 98.4 °F (36.9 °C) (Oral)   Resp 18   Ht 6' 1" (1.854 m)   Wt 84.4 kg (186 lb)   SpO2 (!) 94%   BMI 24.54 kg/m²     Labs Reviewed and Include    Recent Labs   Lab 19  0805   *   CALCIUM 8.7   ALBUMIN 2.0*   PROT 6.8   *   K 3.0*   CO2 31*      BUN 6   CREATININE 1.1   ALKPHOS 99   ALT <5*   AST 10   BILITOT 0.2     Lab Results   Component Value Date    WBC 8.12 2019    HGB 10.1 (L) 2019    HCT 33.1 (L) 2019    MCV 91 2019     2019     No results for input(s): TSH, FREET4 in the last 168 hours.  Lab Results   Component Value Date    HGBA1C >14.0 (H) 2019       Nutritional status:   Body mass index is 24.54 kg/m².  Lab Results   Component Value Date    ALBUMIN 2.0 (L) 2019    ALBUMIN 2.0 (L) 2019    ALBUMIN 1.9 (L) 2019     No results found for: PREALBUMIN    Estimated Creatinine Clearance: 89.8 mL/min (based on SCr of 1.1 mg/dL).    Accu-Checks  Recent Labs     19  0039 19  0439 19  0807 19  1230 19  1638 19  2042 19  0003 19  0409 19  0758 19  1217   POCTGLUCOSE 215* 203* 213* 175* 191* 186* 164* 163* 123* 150*       Current Medications and/or Treatments Impacting Glycemic Control  Immunotherapy:    Immunosuppressants     None        Steroids:   Hormones (From admission, onward)    None        Pressors:    Autonomic Drugs (From admission, onward)    None        Hyperglycemia/Diabetes Medications:   Antihyperglycemics (From admission, onward)    Start     Stop Route Frequency Ordered    " 07/15/19 1735  insulin aspart U-100 pen 0-4 Units      -- SubQ Every 4 hours PRN 07/15/19 1636    07/13/19 2045  insulin regular (Humulin R) 100 Units in sodium chloride 0.9% 100 mL infusion      -- IV Continuous 07/13/19 1937

## 2019-07-18 NOTE — ASSESSMENT & PLAN NOTE
49 year old male with uncontrolled DM and HTN admitted in DKA.  Also had left foot wound and Group B strep bacteremia.  Initial wound cultures grew GBS and MSSA, finegoldia      Per podiatry note, there was necrosis and purulence tracking down to bone and patient underwent I&D with podiatry 7/14; left 5th met head noted to be spongy. Cultures from met head with Group B strep and now peptoniphilus       He underwent repeat I&D and left partial 5th ray amputation 7/17.   Per OP note,  purulent drainage was expressed proximal to distal from the level of the plantar aspect of the left calcaneus to the open wound on the plantar midfoot. Purulent drainage was also manually expressed from the plantar distal aspect of the forefoot. Cultures of this were sent.   A small portion of the distal metatarsal was reported as sent as a clean margin to Micro and pathology.  The wound was partially closed, Neox allograft and wound Vac placed.       Repeat blood cultures remain NGTD.   ABIs without significant PAD.  Afebrile, no leukocytosis.     Plan:  1. Continue cefazolin 6 gram IV continuous infusion (for group B strep and MSSA).  Finegoldia and peptoniphilus grew from debrided/removed tissue, would not treat at this time unless grows from clean margins.   2. Will follow clean margins for culture and pathology.  Note that though clean margins noted to be in process, unclear if sample was received in micro.  Need to confirm.    3.  If all infection removed in the OR and clean margins negative, anticipate 2 weeks of IV antibiotics for bacteremia and skin and soft tissue infection from date of surgery, 7/17.   If there is concern for residual osteomyelitis, will need 6 weeks of IV therapy.  Can follow up clean margin cultures and pathology as outpatient if not resulted before discharge.  Would therefore plan on minimum of 14 days of abx,  but would continue IV until seen in ID follow up, with ultimate duration to be determined  then.   3. Will need PICC line prior to discharge  4. Weekly cbc, cmp, crp, esr and fax results to ID at [phone].  5. We will arrange for a follow-up appointment in Infectious Diseases clinic in 2 weeks.  6.  Prior to discharge, please ensure the patient's follow-up has been scheduled.  If there is still no follow-up scheduled in Infectious Diseases clinic, please send an EPIC message to the Infectious Diseases charge nurse Rhoda Martinez.  7.  Will sign off.  Please call or re-consult as needed.     Data reviewed and plan discussed with ID staff

## 2019-07-18 NOTE — SUBJECTIVE & OBJECTIVE
Subjective:     Interval History:   Patient seen at bedside s/p left foot partial 5th ray amputation with irrigation and debridement and application of graft. No acute distress. Denies any pedal pain. Denies calf pain. No new complaints. Dressings to left foot clean, dry, intact. Wound vac to left foot wound intact running @ 125 mmHg continuous pressure. Admits to nausea since ED admission, no vomiting, fever, chills.     Follow-up For: Procedure(s) (LRB):  DEBRIDEMENT, FOOT with leftt 5th ray partial amputation with Neox Graft (Left)    Post-Operative Day: 1 Day Post-Op    Scheduled Meds:   amLODIPine  5 mg Oral Daily    atorvastatin  80 mg Oral Daily    ceFAZolin(ANCEF) in D5W 500 mL CONTINUOUS INFUSION  6 g Intravenous Q24H    enoxaparin  40 mg Subcutaneous Daily     Continuous Infusions:   sodium chloride 0.9% 75 mL/hr at 07/17/19 1719    insulin (HUMAN R) infusion (adults) 0.4 Units/hr (07/18/19 0802)     PRN Meds:acetaminophen, calcium carbonate, Dextrose 10% Bolus, Dextrose 10% Bolus, glucagon (human recombinant), glucose, glucose, hydrALAZINE, insulin aspart U-100, ondansetron, promethazine (PHENERGAN) IVPB, sodium chloride 0.9%, sodium chloride 0.9%    Review of Systems   Constitutional: Positive for appetite change. Negative for chills and fever.   Eyes: Negative for visual disturbance.   Respiratory: Negative for cough and shortness of breath.    Cardiovascular: Negative for chest pain and leg swelling.   Gastrointestinal: Positive for nausea and vomiting. Negative for abdominal pain, constipation and diarrhea.   Genitourinary: Negative for dysuria, frequency and hematuria.   Musculoskeletal: Negative for back pain and myalgias.   Skin: Positive for color change and wound (left foot surgical wound; right foot plantar ulcer). Negative for rash.   Neurological: Negative for dizziness, light-headedness and headaches.   Psychiatric/Behavioral: Negative for agitation and behavioral problems. The  patient is not nervous/anxious.      Objective:     Vital Signs (Most Recent):  Temp: 98.2 °F (36.8 °C) (07/18/19 1138)  Pulse: (!) 116 (07/18/19 1205)  Resp: 18 (07/18/19 1138)  BP: (!) 163/97 (07/18/19 1138)  SpO2: 97 % (07/18/19 1138) Vital Signs (24h Range):  Temp:  [96.9 °F (36.1 °C)-98.7 °F (37.1 °C)] 98.2 °F (36.8 °C)  Pulse:  [] 116  Resp:  [15-18] 18  SpO2:  [95 %-97 %] 97 %  BP: (136-170)/(68-99) 163/97     Weight: 84.4 kg (186 lb)  Body mass index is 24.54 kg/m².    Foot Exam    General  Orientation: alert and oriented to person, place, and time       Right Foot/Ankle     Inspection and Palpation  Tenderness: none   Swelling: none   Skin Exam: ulcer;     Neurovascular  Dorsalis pedis: 1+  Posterior tibial: 1+  Saphenous nerve sensation: diminished  Tibial nerve sensation: diminished  Superficial peroneal nerve sensation: diminished  Deep peroneal nerve sensation: diminished  Sural nerve sensation: diminished      Left Foot/Ankle      Inspection and Palpation  Ecchymosis: none  Tenderness: none   Swelling: none   Skin Exam: drainage, skin changes and ulcer; (surgical wound 2/2 partial 5th ray amp with irrigation and debridement; proximal plantar sutures remain intact; serosanguinous drainage noted in wound vac cannister; no edema, no erythema, no signs of infection noted)    Neurovascular  Dorsalis pedis: 1+  Posterior tibial: 1+  Saphenous nerve sensation: diminished  Tibial nerve sensation: diminished  Superficial peroneal nerve sensation: diminished  Deep peroneal nerve sensation: diminished  Sural nerve sensation: diminished            Laboratory:  A1C:   Recent Labs   Lab 07/12/19  1023   HGBA1C >14.0*     CBC:   Recent Labs   Lab 07/18/19  0416   WBC 8.12   RBC 3.63*   HGB 10.1*   HCT 33.1*      MCV 91   MCH 27.8   MCHC 30.5*     CRP:   Recent Labs   Lab 07/12/19  1037   CRP 95.8*     ESR:   Recent Labs   Lab 07/12/19  1037   SEDRATE >120*     Wound Cultures:   Recent Labs   Lab  07/12/19  1257 07/14/19  1728 07/17/19  1214   LABAERO STREPTOCOCCUS AGALACTIAE (GROUP B)  Few  Beta-hemolytic streptococci are routinely susceptible to   penicillins,cephalosporins and carbapenems.  Susceptibility testing not routinely performed  *  STAPHYLOCOCCUS AUREUS  Few  * STREPTOCOCCUS AGALACTIAE (GROUP B)  Rare  Beta-hemolytic streptococci are routinely susceptible to   penicillins,cephalosporins and carbapenems.  Susceptibility testing not routinely performed  Skin janes also present  * No growth     Microbiology Results (last 7 days)     Procedure Component Value Units Date/Time    Culture, Anaerobe [[id number]]  (Abnormal) Collected:  07/14/19 1728    Order Status:  Completed Specimen:  Wound from Toe, Left Foot Updated:  07/18/19 1407     Anaerobic Culture PEPTONIPHILUS HAREI  Few      Narrative:       Left 5th metatarsal    AFB Culture & Smear [[id number]] Collected:  07/17/19 1214    Order Status:  Completed Specimen:  Wound from Foot, Left Updated:  07/18/19 1317     AFB CULTURE STAIN No acid fast bacilli seen.    Narrative:       Left foot wound    Blood culture [[id number]] Collected:  07/14/19 0629    Order Status:  Completed Specimen:  Blood Updated:  07/18/19 1012     Blood Culture, Routine No Growth to date      No Growth to date      No Growth to date      No Growth to date      No Growth to date    Culture, Anaerobe [[id number]] Collected:  07/17/19 1214    Order Status:  Completed Specimen:  Wound from Foot, Left Updated:  07/18/19 0910     Anaerobic Culture Culture in progress    Narrative:       Left foot wound    Blood culture [[id number]] Collected:  07/15/19 0305    Order Status:  Completed Specimen:  Blood from Antecubital, Right Updated:  07/18/19 0822     Blood Culture, Routine No Growth to date      No Growth to date      No Growth to date      No Growth to date    Blood culture [[id number]] Collected:  07/15/19 0305    Order Status:  Completed Specimen:  Blood from Antecubital, Right  Updated:  07/18/19 0822     Blood Culture, Routine No Growth to date      No Growth to date      No Growth to date      No Growth to date    Aerobic culture [[id number]] Collected:  07/17/19 1214    Order Status:  Completed Specimen:  Wound from Foot, Left Updated:  07/18/19 0635     Aerobic Bacterial Culture No growth    Narrative:       Left foot wound    Blood culture [[id number]] Collected:  07/13/19 0358    Order Status:  Completed Specimen:  Blood Updated:  07/18/19 0612     Blood Culture, Routine No growth after 5 days.    Blood culture [[id number]] Collected:  07/13/19 0358    Order Status:  Completed Specimen:  Blood Updated:  07/18/19 0612     Blood Culture, Routine No growth after 5 days.    Culture, Anaerobe [[id number]] Collected:  07/17/19 1659    Order Status:  Sent Specimen:  Tissue from Foot, Left Updated:  07/17/19 1659    Aerobic culture [[id number]] Collected:  07/17/19 1659    Order Status:  Sent Specimen:  Tissue from Foot, Left Updated:  07/17/19 1659    Fungus culture [[id number]] Collected:  07/17/19 1659    Order Status:  Sent Specimen:  Tissue from Foot, Left Updated:  07/17/19 1659    Gram stain [[id number]] Collected:  07/17/19 1659    Order Status:  Sent Specimen:  Tissue from Foot, Left Updated:  07/17/19 1659    AFB Culture & Smear [[id number]] Collected:  07/17/19 1659    Order Status:  Sent Specimen:  Tissue from Foot, Left Updated:  07/17/19 1659    Gram stain [[id number]] Collected:  07/17/19 1214    Order Status:  Completed Specimen:  Wound from Foot, Left Updated:  07/17/19 1403     Gram Stain Result No WBC's      No organisms seen    Narrative:       Left foot wound    Fungus culture [[id number]] Collected:  07/17/19 1214    Order Status:  Sent Specimen:  Wound from Foot, Left Updated:  07/17/19 1228    Culture, Anaerobe [[id number]]  (Abnormal) Collected:  07/12/19 1257    Order Status:  Completed Specimen:  Wound from Foot, Left Updated:  07/16/19 1436     Anaerobic Culture FINEGOLDIA  MAGNA  Many      Aerobic culture [[id number]]  (Abnormal) Collected:  07/14/19 1728    Order Status:  Completed Specimen:  Wound from Toe, Left Foot Updated:  07/16/19 0850     Aerobic Bacterial Culture STREPTOCOCCUS AGALACTIAE (GROUP B)  Rare  Beta-hemolytic streptococci are routinely susceptible to   penicillins,cephalosporins and carbapenems.  Susceptibility testing not routinely performed  Skin janes also present      Narrative:       Left 5th metatarsal    Aerobic culture [[id number]]  (Abnormal)  (Susceptibility) Collected:  07/12/19 1257    Order Status:  Completed Specimen:  Wound from Foot, Left Updated:  07/15/19 0953     Aerobic Bacterial Culture STREPTOCOCCUS AGALACTIAE (GROUP B)  Few  Beta-hemolytic streptococci are routinely susceptible to   penicillins,cephalosporins and carbapenems.  Susceptibility testing not routinely performed        STAPHYLOCOCCUS AUREUS  Few      Blood culture x two cultures. Draw prior to antibiotics. [[id number]]  (Abnormal) Collected:  07/12/19 1044    Order Status:  Completed Specimen:  Blood from Peripheral, Antecubital, Right Updated:  07/14/19 0947     Blood Culture, Routine Gram stain mary bottle: Gram positive cocci in chains resembling Strep      Results called to and read back by:Farrah Ayala RN 07/12/2019        22:47      Gram stain aer bottle: Gram positive cocci in chains resembling Strep       07/13/2019  06:46      STREPTOCOCCUS AGALACTIAE (GROUP B)  Beta-hemolytic streptococci are routinely susceptible to   penicillins,cephalosporins and carbapenems.  For susceptibility see order #[id number]      Narrative:       Aerobic and anaerobic    Blood culture x two cultures. Draw prior to antibiotics. [[id number]]  (Abnormal)  (Susceptibility) Collected:  07/12/19 1044    Order Status:  Completed Specimen:  Blood from Peripheral, Antecubital, Left Updated:  07/14/19 0947     Blood Culture, Routine Gram stain mary bottle: Gram positive cocci in chains resembling Strep       Results called to and read back by:Farrah Ayala RN 07/12/2019        22:46      Gram stain aer bottle: Gram positive cocci in chains resembling Strep       07/13/2019  06:43      STREPTOCOCCUS AGALACTIAE (GROUP B)  Beta-hemolytic streptococci are routinely susceptible to   penicillins,cephalosporins and carbapenems.      Narrative:       Aerobic and anaerobic        Specimen (12h ago, onward)    None          Diagnostic Results:  I have reviewed all pertinent imaging results/findings within the past 24 hours.    Clinical Findings:  Left foot with wound vac application intact; Running at 125 mmHg continuous pressure  Serosanguinous drainage noted in wound vac cannister; No erythema, no edema, no signs of infection    7/18      7/16      7/15 s/p I&D      7/12 post ED bedside debridement      7/12 pre ED bedside debridement

## 2019-07-18 NOTE — PROGRESS NOTES
Progress Note  Hospital Medicine    Admit Date: 7/12/2019  Length of Stay:  LOS: 6 days     SUBJECTIVE:         Follow-up For:  Acute osteomyelitis    HPI/Interval history (See H&P for complete P,F,SHx) :   Over view  Indio Ryder Jr. is a 49 y.o. male with a PMH of T2DM (poorly  controlled, with long term insulin use) and HTN who presented to the ED on 7/12/2019 diagnosed with  Vomiting (vomiting for past 4 days, denies sick contacts)   Oriented x3 accompanied by daughter at the bedside.  Mentions that he wears lower extremity stockings for edema. Reportedly formed a new ulcer on the left lateral aspect of the foot about 10 days back.  had 4-5 days of nausea and vomiting. Vomiting is non-bloody, No abdominal pain..  It occurs immediately after eating and he has not been able to keep anything down. Associated with fevers and chills but no objective temperature measurement.  At baseline only takes insulin once a day stop taking more than a week because of nausea and vomiting and poor oral intake.  Does not check fingersticks regularly at home. recently lost his insurance and has been unable to follow up with wound care.  His last follow-up with primary care provider and podiatrist was about 2 years.  He has been managing his right foot ulcer at home with dressing but over the past 2 weeks an ulcer on his left foot has developed. He denies any pain or injury- has chronic numbness of bilateral lower extremities and hands from diabetic neuropathy.  Found to be in DKA in the emergency department with beta hydroxybutyrate elevated at 4.1.  Started on insulin drip    07/13/2019   Temperature of 101.5 last night.  Anion gap resolved- bicarbonate 23, insulin drip discontinued.  Started on Levemir 23 units q.a.m. 7 units as part t.i.d. with meals with low-dose sliding scale.  Diabetic diet and NPO from midnight for possible intervention.  MRI of the foot- Ulceration involving the lateral plantar aspect of the distal left  foot, with findings concerning for exposed bone involving the distal aspect of the 5th metatarsal and proximal phalange of the 5th toe.  There are associated changes concerning for osteomyelitis involving the distal aspect of the 5th metatarsal and entirety of the right toe.  There is osseous hyperemia involving the proximal phalange of the 2nd toe, early changes of osteomyelitis are a consideration . aerobic culture  and blood culture with Group B strep. discontinued vancomycin       07/14/2019  Had urinary retention of 800 mL but spontaneously voided.  Started on transitional insulin drip as NPO from midnight for OR today.  Hypokalemia repeat.  Blood cultures daily until clear.  Aerobic cultures with group B Streptococcus and Staph aureus.  Added vancomycin ( monitor for toxicity)    Interval history   s/p I&D and 5th ray amputation in the OR  on  07/14/2019.  ALISSA ordered to evaluate vascular status intraoperative Cultures pending . clean margin cultures and pathology pending. Deescalated from vanc and zosyn to cefazolin 6 gram IV cont infusion.  blood cultures from 07/13/2019 no growth.  Continues to have nausea vomiting since unable to tolerate anything by mouth.  Started on IV hydration and Reglan    Review of Systems: List if applicable  Constitutional: Positive for activity change, appetite change (Poor appetite for the last and), chills, fatigue, fever ( weight loss) and unexpected weight change.   HENT: Negative for congestion, ear discharge, ear pain, facial swelling and sore throat.    Eyes: Negative for pain, discharge and itching.   Respiratory: Positive for shortness of breath ( at rest and exertion ). Negative for cough, chest tightness and wheezing.    Cardiovascular: Positive for leg swelling ( chronic). Negative for chest pain and palpitations.   Gastrointestinal: Positive for vomiting. Negative for abdominal distention, abdominal pain, blood in stool, constipation and diarrhea.   Endocrine:  Negative for cold intolerance.   Genitourinary: Negative for dysuria, hematuria and urgency.   Musculoskeletal: Negative for arthralgias, gait problem, myalgias, neck pain and neck stiffness.   Skin: Negative for color change, pallor and rash.   Allergic/Immunologic: Negative for environmental allergies.   Neurological: Positive for weakness and numbness ( bilateral lower extremities and hands attributes to diabetic neuropathy). Negative for dizziness, syncope, light-headedness and headaches.   Hematological: Negative for adenopathy.   Psychiatric/Behavioral: Negative for agitation, behavioral problems and confusion.     OBJECTIVE:     Vital Signs Range (Last 24H):  Temp:  [96.9 °F (36.1 °C)-98.7 °F (37.1 °C)]   Pulse:  []   Resp:  [15-18]   BP: (136-170)/(68-99)   SpO2:  [95 %-97 %]     Physical Exam:  Constitutional: He is oriented to person, place, and time. He appears well-developed and well-nourished.   HENT:   Head: Normocephalic and atraumatic.   Mouth/Throat: Oropharynx is clear and moist. No oropharyngeal exudate.   Eyes: Pupils are equal, round, and reactive to light. Conjunctivae and EOM are normal. Right eye exhibits no discharge. Left eye exhibits no discharge. No scleral icterus.   Neck: Normal range of motion. Neck supple. No JVD present. No tracheal deviation present. No thyromegaly present.   Cardiovascular: Normal rate, regular rhythm and normal heart sounds. Exam reveals no gallop and no friction rub.   No murmur heard.  Pulmonary/Chest: Effort normal and breath sounds normal. No stridor. No respiratory distress. He has no wheezes. He has no rales.   Abdominal: Soft. Bowel sounds are normal. He exhibits no distension and no mass. There is no tenderness. There is no guarding.   Musculoskeletal: Normal range of motion. He exhibits edema.   Lymphadenopathy:     He has no cervical adenopathy.   Neurological: He is oriented to person, place, and time. No cranial nerve deficit.   Able to move  upper and lower extremities without limitation   Skin: Skin is warm and dry.   Left foot: 6cm ulcer with exposed subcutaneous fat and eschar with foul smelling, purulent drainage overlying lateral aspect of left 5th MTP.     Right foot: 1.5cm clean based ulcer at base of right 5th MTP joint. Right great toe and second toe surgically amputated.     Psychiatric: He has a normal mood and affect. His behavior is normal.   Nursing note and vitals reviewed    Medications:  Medication list was reviewed and changes noted under Assessment/Plan.      Current Facility-Administered Medications:     0.9%  NaCl infusion, , Intravenous, Continuous, Vicente Wick MD, Last Rate: 75 mL/hr at 07/17/19 1719    acetaminophen tablet 650 mg, 650 mg, Oral, Q6H PRN, Vicente Wick MD, 650 mg at 07/17/19 0458    amLODIPine tablet 5 mg, 5 mg, Oral, Daily, Vicente Wick MD, 5 mg at 07/18/19 0836    atorvastatin tablet 80 mg, 80 mg, Oral, Daily, Vicente Wick MD, 80 mg at 07/18/19 0836    calcium carbonate 200 mg calcium (500 mg) chewable tablet 1,000 mg, 1,000 mg, Oral, BID PRN, Antonio Tate PA-C, 1,000 mg at 07/14/19 1850    ceFAZolin (ANCEF) 6 g in dextrose 5 % 500 mL CONTINUOUS INFUSION, 6 g, Intravenous, Q24H, RAMILA Kinsey, 6 g at 07/18/19 1320    dextrose 10% (D10W) Bolus, 12.5 g, Intravenous, PRN, Vicente Wick MD    dextrose 10% (D10W) Bolus, 25 g, Intravenous, PRN, Vicente Wick MD    enoxaparin injection 40 mg, 40 mg, Subcutaneous, Daily, Vicente Wick MD, 40 mg at 07/17/19 1719    glucagon (human recombinant) injection 1 mg, 1 mg, Intramuscular, PRN, Roselyn Chow MD    glucose chewable tablet 16 g, 16 g, Oral, PRN, Roselyn Chow MD    glucose chewable tablet 24 g, 24 g, Oral, PRN, Roselyn Chow MD    hydrALAZINE tablet 25 mg, 25 mg, Oral, Q8H PRN, Vicente Wick MD, 25 mg at 07/17/19 1422    insulin aspart U-100 pen 0-4 Units, 0-4 Units, Subcutaneous, Q4H PRN, Max Huston  MD Amanda, 1 Units at 07/17/19 1640    insulin regular (Humulin R) 100 Units in sodium chloride 0.9% 100 mL infusion, 0.9 Units/hr, Intravenous, Continuous, Max Betzaida Patel MD, Last Rate: 0.4 mL/hr at 07/18/19 0802, 0.4 Units/hr at 07/18/19 0802    ondansetron injection 8 mg, 8 mg, Intravenous, Q8H PRN, Zeynpe Duke PA-C    promethazine (PHENERGAN) 12.5 mg in dextrose 5 % 50 mL IVPB, 12.5 mg, Intravenous, Q6H PRN, Zeynep Duke PA-C, Last Rate: 150 mL/hr at 07/18/19 0012, 12.5 mg at 07/18/19 0012    sodium chloride 0.9% flush 10 mL, 10 mL, Intravenous, PRN, Vicente Wick MD    sodium chloride 0.9% flush 10 mL, 10 mL, Intravenous, PRN, Mariela Damon MD    acetaminophen, calcium carbonate, Dextrose 10% Bolus, Dextrose 10% Bolus, glucagon (human recombinant), glucose, glucose, hydrALAZINE, insulin aspart U-100, ondansetron, promethazine (PHENERGAN) IVPB, sodium chloride 0.9%, sodium chloride 0.9%    Laboratory/Diagnostic Data:  Reviewed and noted in plan where applicable- Please see chart for full lab data.    Recent Labs   Lab 07/16/19  0316 07/17/19  0332 07/18/19  0416   WBC 10.87 8.45 8.12   HGB 11.5* 11.7* 10.1*   HCT 37.1* 38.8* 33.1*   * 312 332       Recent Labs   Lab 07/12/19  1037  07/16/19  0316  07/17/19  0332 07/17/19  0746 07/17/19  1942 07/18/19  0416 07/18/19  0805   NA  --    < >  --    < >  --  145 148*  --  148*   K  --    < >  --    < >  --  3.2* 3.3*  --  3.0*   CL  --    < >  --    < >  --  104 105  --  107   CO2  --    < >  --    < >  --  30* 32*  --  31*   BUN  --    < >  --    < >  --  7 6  --  6   CREATININE  --    < >  --    < >  --  1.4 1.2  --  1.1   GLU  --    < >  --    < >  --  241* 192*  --  138*   CALCIUM  --    < >  --    < >  --  9.0 8.7  --  8.7   MG 1.7   < > 2.0  --  2.0  --   --  1.7  --    PHOS  --    < > 2.9  --  2.6*  --   --  2.8  --    LIPASE <3*  --   --   --   --   --   --   --   --     < > = values in this interval not displayed.        Recent Labs   Lab 07/17/19  0746 07/17/19  1942 07/18/19  0805   ALKPHOS 105 103 99   ALT <5* <5* <5*   AST 8* 10 10   ALBUMIN 1.9* 2.0* 2.0*   PROT 6.9 7.1 6.8   BILITOT 0.2 0.2 0.2        Microbiology labs for the last week  Microbiology Results (last 7 days)     Procedure Component Value Units Date/Time    Culture, Anaerobe [[id number]]  (Abnormal) Collected:  07/14/19 1728    Order Status:  Completed Specimen:  Wound from Toe, Left Foot Updated:  07/18/19 1407     Anaerobic Culture PEPTONIPHILUS HAREI  Few      Narrative:       Left 5th metatarsal    AFB Culture & Smear [[id number]] Collected:  07/17/19 1214    Order Status:  Completed Specimen:  Wound from Foot, Left Updated:  07/18/19 1317     AFB CULTURE STAIN No acid fast bacilli seen.    Narrative:       Left foot wound    Blood culture [[id number]] Collected:  07/14/19 0629    Order Status:  Completed Specimen:  Blood Updated:  07/18/19 1012     Blood Culture, Routine No Growth to date      No Growth to date      No Growth to date      No Growth to date      No Growth to date    Culture, Anaerobe [[id number]] Collected:  07/17/19 1214    Order Status:  Completed Specimen:  Wound from Foot, Left Updated:  07/18/19 0910     Anaerobic Culture Culture in progress    Narrative:       Left foot wound    Blood culture [[id number]] Collected:  07/15/19 0305    Order Status:  Completed Specimen:  Blood from Antecubital, Right Updated:  07/18/19 0822     Blood Culture, Routine No Growth to date      No Growth to date      No Growth to date      No Growth to date    Blood culture [[id number]] Collected:  07/15/19 0305    Order Status:  Completed Specimen:  Blood from Antecubital, Right Updated:  07/18/19 0822     Blood Culture, Routine No Growth to date      No Growth to date      No Growth to date      No Growth to date    Aerobic culture [[id number]] Collected:  07/17/19 1214    Order Status:  Completed Specimen:  Wound from Foot, Left Updated:  07/18/19 0635      Aerobic Bacterial Culture No growth    Narrative:       Left foot wound    Blood culture [[id number]] Collected:  07/13/19 0358    Order Status:  Completed Specimen:  Blood Updated:  07/18/19 0612     Blood Culture, Routine No growth after 5 days.    Blood culture [[id number]] Collected:  07/13/19 0358    Order Status:  Completed Specimen:  Blood Updated:  07/18/19 0612     Blood Culture, Routine No growth after 5 days.    Culture, Anaerobe [[id number]] Collected:  07/17/19 1659    Order Status:  Sent Specimen:  Tissue from Foot, Left Updated:  07/17/19 1659    Aerobic culture [[id number]] Collected:  07/17/19 1659    Order Status:  Sent Specimen:  Tissue from Foot, Left Updated:  07/17/19 1659    Fungus culture [[id number]] Collected:  07/17/19 1659    Order Status:  Sent Specimen:  Tissue from Foot, Left Updated:  07/17/19 1659    Gram stain [[id number]] Collected:  07/17/19 1659    Order Status:  Sent Specimen:  Tissue from Foot, Left Updated:  07/17/19 1659    AFB Culture & Smear [[id number]] Collected:  07/17/19 1659    Order Status:  Sent Specimen:  Tissue from Foot, Left Updated:  07/17/19 1659    Gram stain [[id number]] Collected:  07/17/19 1214    Order Status:  Completed Specimen:  Wound from Foot, Left Updated:  07/17/19 1403     Gram Stain Result No WBC's      No organisms seen    Narrative:       Left foot wound    Fungus culture [[id number]] Collected:  07/17/19 1214    Order Status:  Sent Specimen:  Wound from Foot, Left Updated:  07/17/19 1228    Culture, Anaerobe [[id number]]  (Abnormal) Collected:  07/12/19 1257    Order Status:  Completed Specimen:  Wound from Foot, Left Updated:  07/16/19 1436     Anaerobic Culture FINEGOLDIA MAGNA  Many      Aerobic culture [[id number]]  (Abnormal) Collected:  07/14/19 1728    Order Status:  Completed Specimen:  Wound from Toe, Left Foot Updated:  07/16/19 0850     Aerobic Bacterial Culture STREPTOCOCCUS AGALACTIAE (GROUP B)  Rare  Beta-hemolytic streptococci are routinely  susceptible to   penicillins,cephalosporins and carbapenems.  Susceptibility testing not routinely performed  Skin janes also present      Narrative:       Left 5th metatarsal    Aerobic culture [[id number]]  (Abnormal)  (Susceptibility) Collected:  07/12/19 1257    Order Status:  Completed Specimen:  Wound from Foot, Left Updated:  07/15/19 0953     Aerobic Bacterial Culture STREPTOCOCCUS AGALACTIAE (GROUP B)  Few  Beta-hemolytic streptococci are routinely susceptible to   penicillins,cephalosporins and carbapenems.  Susceptibility testing not routinely performed        STAPHYLOCOCCUS AUREUS  Few      Blood culture x two cultures. Draw prior to antibiotics. [[id number]]  (Abnormal) Collected:  07/12/19 1044    Order Status:  Completed Specimen:  Blood from Peripheral, Antecubital, Right Updated:  07/14/19 0947     Blood Culture, Routine Gram stain mary bottle: Gram positive cocci in chains resembling Strep      Results called to and read back by:Farrah Ayala RN 07/12/2019        22:47      Gram stain aer bottle: Gram positive cocci in chains resembling Strep       07/13/2019  06:46      STREPTOCOCCUS AGALACTIAE (GROUP B)  Beta-hemolytic streptococci are routinely susceptible to   penicillins,cephalosporins and carbapenems.  For susceptibility see order #[id number]      Narrative:       Aerobic and anaerobic    Blood culture x two cultures. Draw prior to antibiotics. [[id number]]  (Abnormal)  (Susceptibility) Collected:  07/12/19 1044    Order Status:  Completed Specimen:  Blood from Peripheral, Antecubital, Left Updated:  07/14/19 0947     Blood Culture, Routine Gram stain mary bottle: Gram positive cocci in chains resembling Strep      Results called to and read back by:Farrah Ayala RN 07/12/2019        22:46      Gram stain aer bottle: Gram positive cocci in chains resembling Strep       07/13/2019  06:43      STREPTOCOCCUS AGALACTIAE (GROUP B)  Beta-hemolytic streptococci are routinely susceptible to    penicillins,cephalosporins and carbapenems.      Narrative:       Aerobic and anaerobic           Imaging Results          MRI Foot (Forefoot) Left Without Contrast (Final result)  Result time 07/12/19 19:07:38    Final result by Bob Oglesby MD (07/12/19 19:07:38)                 Impression:      Ulceration involving the lateral plantar aspect of the distal left foot, with findings concerning for exposed bone involving the distal aspect of the 5th metatarsal and proximal phalange of the 5th toe.  There are associated changes concerning for osteomyelitis involving the distal aspect of the 5th metatarsal and entirety of the right toe.  There is osseous hyperemia involving the proximal phalange of the 2nd toe, early changes of osteomyelitis are a consideration no definite low signal on T1 at this time.      Electronically signed by: Bob Oglesby MD  Date:    07/12/2019  Time:    19:07             Narrative:    EXAMINATION:  MRI FOOT (FOREFOOT) LEFT WITHOUT CONTRAST    CLINICAL HISTORY:  Open wound of ankle, foot and toes;    TECHNIQUE:  Multiplanar multisequence MRI images of the left forefoot were obtained without administration of IV contrast.    COMPARISON:  Radiograph 07/12/2019    FINDINGS:  There is soft tissue ulceration involving the lateral plantar aspect of the right foot, noting there appears to be exposed bone in the location, likely involving the distal aspect of the 5th metatarsal head, and proximal phalange of the 5th toe. There is diffusely increased STIR signal within the distal aspect of the 5th metatarsal, and throughout the phalanges of the 5th toe. There is corresponding low T1 signal involving the same regions, concerning for osteomyelitis. There is additional abnormal STIR signal within the base of the proximal phalange of the 2nd toe, without convincing low signal on T1, suggesting osseous hyperemia although early changes of osteomyelitis remain a consideration. There is edema and  induration about the open wound, no convincing focal organized fluid collection.  The remainder of the osseous structures are grossly unremarkable. No significant joint effusions.  There is reactive edema about the intrinsic musculature of the foot, as well as along the dorsal surface.                                X-Ray Foot Complete Left (Final result)  Result time 07/12/19 11:06:16    Final result by Chandan Montana MD (07/12/19 11:06:16)                 Impression:      Abnormal examination with findings strongly suggesting osteomyelitis involving the base of the proximal phalanx of the left 5th toe and likely the head of the 5th metatarsal as well.    This report was flagged in Epic as abnormal.      Electronically signed by: Chandan Montana MD  Date:    07/12/2019  Time:    11:06             Narrative:    EXAMINATION:  XR FOOT COMPLETE 3 VIEW LEFT    TECHNIQUE:  Three views of the left foot were obtained, with AP, lateral, and oblique projections submitted.    COMPARISON:  No relevant comparison examinations are currently available.    FINDINGS:  Focal soft tissue loss/irregularity involving the region of the left 5th MTP joint is observed, with gas present within the soft tissues at this level, the findings likely related to a clinically evident ulceration.  There is focal lytic bone destruction involving the base of the proximal phalanx of the 5th toe, and possibly involving the head of the 5th metatarsal as well, this radiographic appearance strongly suggesting osteomyelitis.  The remaining osseous structures appear intact, with no evidence of recent fracture and no additional areas of focal lytic bone destruction noted.  Some arterial vascular calcification in the soft tissues about the ankle is incidentally observed.                               X-Ray Chest AP Portable (Final result)  Result time 07/12/19 10:48:19    Final result by Chandan Montana MD (07/12/19 10:48:19)                 Impression:      No  "significant intrathoracic abnormality.  Allowing for differences in projection and a poorer inspiratory depth level on the current examination, there has been no significant detrimental interval change in the appearance of the chest since 05/03/2016.      Electronically signed by: Chandan Montana MD  Date:    07/12/2019  Time:    10:48             Narrative:    EXAMINATION:  XR CHEST AP PORTABLE    CLINICAL HISTORY:  hyperglycemia;    COMPARISON:  Comparison is made to the most recent prior chest radiograph, dated 05/03/2016.    FINDINGS:  Heart size is normal, as is the appearance of the pulmonary vascularity.  Lung zones are clear, and free of significant airspace consolidation or volume loss.  No pleural fluid.  No abnormal mediastinal widening.  No pneumothorax.  Incidental note is made of some spurring at the right acromioclavicular joint level.                                Estimated body mass index is 24.54 kg/m² as calculated from the following:    Height as of this encounter: 6' 1" (1.854 m).    Weight as of this encounter: 84.4 kg (186 lb).    I & O (Last 24H):    Intake/Output Summary (Last 24 hours) at 7/18/2019 1421  Last data filed at 7/18/2019 1200  Gross per 24 hour   Intake 2131.8 ml   Output 4125 ml   Net -1993.2 ml       Estimated Creatinine Clearance: 89.8 mL/min (based on SCr of 1.1 mg/dL).    ASSESSMENT/PLAN:     Active Problems:    Active Diagnoses:     Diagnosis Date Noted POA    PRINCIPAL PROBLEM:  Acute osteomyelitis [M86.10] x-ray left foot - strongly suggesting osteomyelitis involving the base of the proximal phalanx of the left 5th toe and likely the head of the 5th metatarsal as well.        recently lost his insurance and has been unable to follow up with wound care.  His last follow-up with primary care provider and podiatrist was about 2 years.  He has been managing his right foot ulcer at home with dressing but over the past 2 weeks an ulcer on his left foot has developed. He denies " any pain or injury- has chronic numbness of bilateral lower extremities and hands from diabetic neuropathy.  Started on IV Zosyn and vanc.  Podiatry and Infectious Disease consult.  aerobic culture with Group B strep.  Add vancomycin for Staph aureus  (monitor for toxicity)    07/14/2019   s/p I&D and 5th ray amputation in the OR  on  07/14/2019.  No weight-bearing left lower x-ray ALISSA ordered to evaluate vascular status intraoperative Cultures pending . clean margin cultures and pathology pending. Deescalated from vanc and zosyn to cefazolin 6 gram IV cont infusion.  blood cultures from 07/13/2019 no growth.  Continues to have nausea vomiting since unable to tolerate anything by mouth.  Started on IV hydration and Reglan 07/12/2019 Yes    Sepsis [A41.9] /bacteremia - blood cultures from 07/12/2019 group B Streptococcus. secondary to diabetic foot ulcer.  Elevated ESR greater than 120 As above 07/12/2019 Yes    Leukocytosis [D72.829] 14 secondary to sepsis. resolved  07/12/2019 Unknown    Anemia [D64.9] hemoglobin at 12 10.8 with IV hydration-->, normocytic.  Monitor 07/12/2019 Unknown    Type 2 diabetes mellitus with left eye affected by mild nonproliferative retinopathy without macular edema, without long-term current use of insulin [E11.3292]At baseline only takes insulin once a day stopped  taking more than a week because of nausea and vomiting and poor oral intake.  Does not check fingersticks regularly at home. recently lost his insurance.  Obtain HbA1c elevated greater than 14.  Continue insulin drip.  Endocrine evaluation 08/11/2017 Yes       Chronic    Diabetic ketoacidosis without coma associated with type 2 diabetes mellitus [E11.10]  Found to be in DKA in the emergency department with beta hydroxybutyrate elevated at 4.1.  Started on insulin drip.  Received normal saline in the emergency department    07/13/2019  Anion gap resolved- bicarbonate 23, insulin drip discontinued.  Started on    Started  on transitional insulin drip as NPO from midnight. Start Levemir 14 units qhs at  9pm and Novolog 5 units with meals       Hypokalemia-hypomagnesemia replaced    Hypophosphatemia- placed   05/30/2017 Unknown    Type 2 diabetes mellitus with diabetic neuropathy, with long-term current use of insulin [E11.40, Z79.4] 05/03/2016 Not Applicable       Chronic    Mixed hyperlipidemia [E78.2] continue with Lipitor 08/12/2014 Yes    Essential hypertension [I10] blood pressure controlled without medications.  Monitor  06/06/2013 Yes       Chronic               Disposition- home    DVT prophylaxis addressed with:  Brian Rust M.D.

## 2019-07-18 NOTE — ANESTHESIA POSTPROCEDURE EVALUATION
Anesthesia Post Evaluation    Patient: Indio Ryder Jr.    Procedure(s) Performed: Procedure(s) (LRB):  DEBRIDEMENT, FOOT with leftt 5th ray partial amputation with Neox Graft (Left)    Final Anesthesia Type: general  Patient location during evaluation: PACU  Patient participation: Yes- Able to Participate  Level of consciousness: awake and alert and oriented  Post-procedure vital signs: reviewed and stable  Pain management: adequate  Airway patency: patent  PONV status at discharge: No PONV  Anesthetic complications: no      Cardiovascular status: blood pressure returned to baseline  Respiratory status: unassisted  Hydration status: euvolemic  Follow-up not needed.          Vitals Value Taken Time   /97 7/18/2019 11:38 AM   Temp 36.8 °C (98.2 °F) 7/18/2019 11:38 AM   Pulse 116 7/18/2019 12:05 PM   Resp 18 7/18/2019 11:38 AM   SpO2 97 % 7/18/2019 11:38 AM         No case tracking events are documented in the log.      Pain/Jenny Score: Pain Rating Prior to Med Admin: 7 (7/17/2019  4:58 AM)  Pain Rating Post Med Admin: 0 (Patient sleeping ) (7/17/2019  5:58 AM)  Jenny Score: 10 (7/17/2019  1:00 PM)

## 2019-07-19 LAB
ALBUMIN SERPL BCP-MCNC: 2.1 G/DL (ref 3.5–5.2)
ALBUMIN SERPL BCP-MCNC: 2.1 G/DL (ref 3.5–5.2)
ALP SERPL-CCNC: 103 U/L (ref 55–135)
ALP SERPL-CCNC: 107 U/L (ref 55–135)
ALT SERPL W/O P-5'-P-CCNC: <5 U/L (ref 10–44)
ALT SERPL W/O P-5'-P-CCNC: <5 U/L (ref 10–44)
ANION GAP SERPL CALC-SCNC: 11 MMOL/L (ref 8–16)
ANION GAP SERPL CALC-SCNC: 11 MMOL/L (ref 8–16)
AST SERPL-CCNC: 11 U/L (ref 10–40)
AST SERPL-CCNC: 12 U/L (ref 10–40)
BACTERIA BLD CULT: NORMAL
BACTERIA SPEC AEROBE CULT: NORMAL
BASOPHILS # BLD AUTO: 0.03 K/UL (ref 0–0.2)
BASOPHILS NFR BLD: 0.4 % (ref 0–1.9)
BILIRUB SERPL-MCNC: 0.2 MG/DL (ref 0.1–1)
BILIRUB SERPL-MCNC: 0.2 MG/DL (ref 0.1–1)
BUN SERPL-MCNC: 6 MG/DL (ref 6–20)
BUN SERPL-MCNC: 6 MG/DL (ref 6–20)
CALCIUM SERPL-MCNC: 8.6 MG/DL (ref 8.7–10.5)
CALCIUM SERPL-MCNC: 9 MG/DL (ref 8.7–10.5)
CHLORIDE SERPL-SCNC: 101 MMOL/L (ref 95–110)
CHLORIDE SERPL-SCNC: 98 MMOL/L (ref 95–110)
CO2 SERPL-SCNC: 33 MMOL/L (ref 23–29)
CO2 SERPL-SCNC: 33 MMOL/L (ref 23–29)
CREAT SERPL-MCNC: 1 MG/DL (ref 0.5–1.4)
CREAT SERPL-MCNC: 1.1 MG/DL (ref 0.5–1.4)
DIFFERENTIAL METHOD: ABNORMAL
EOSINOPHIL # BLD AUTO: 0.1 K/UL (ref 0–0.5)
EOSINOPHIL NFR BLD: 1.2 % (ref 0–8)
ERYTHROCYTE [DISTWIDTH] IN BLOOD BY AUTOMATED COUNT: 12.9 % (ref 11.5–14.5)
EST. GFR  (AFRICAN AMERICAN): >60 ML/MIN/1.73 M^2
EST. GFR  (AFRICAN AMERICAN): >60 ML/MIN/1.73 M^2
EST. GFR  (NON AFRICAN AMERICAN): >60 ML/MIN/1.73 M^2
EST. GFR  (NON AFRICAN AMERICAN): >60 ML/MIN/1.73 M^2
GAD65 AB SER-SCNC: 0 NMOL/L
GLUCOSE SERPL-MCNC: 153 MG/DL (ref 70–110)
GLUCOSE SERPL-MCNC: 211 MG/DL (ref 70–110)
HCT VFR BLD AUTO: 35.8 % (ref 40–54)
HGB BLD-MCNC: 11.3 G/DL (ref 14–18)
IMM GRANULOCYTES # BLD AUTO: 0.02 K/UL (ref 0–0.04)
IMM GRANULOCYTES NFR BLD AUTO: 0.3 % (ref 0–0.5)
LYMPHOCYTES # BLD AUTO: 2 K/UL (ref 1–4.8)
LYMPHOCYTES NFR BLD: 26.2 % (ref 18–48)
MAGNESIUM SERPL-MCNC: 1.7 MG/DL (ref 1.6–2.6)
MCH RBC QN AUTO: 28 PG (ref 27–31)
MCHC RBC AUTO-ENTMCNC: 31.6 G/DL (ref 32–36)
MCV RBC AUTO: 89 FL (ref 82–98)
MONOCYTES # BLD AUTO: 0.6 K/UL (ref 0.3–1)
MONOCYTES NFR BLD: 7.5 % (ref 4–15)
NEUTROPHILS # BLD AUTO: 5 K/UL (ref 1.8–7.7)
NEUTROPHILS NFR BLD: 64.4 % (ref 38–73)
NRBC BLD-RTO: 0 /100 WBC
PHOSPHATE SERPL-MCNC: 2.4 MG/DL (ref 2.7–4.5)
PLATELET # BLD AUTO: 350 K/UL (ref 150–350)
PMV BLD AUTO: 10.2 FL (ref 9.2–12.9)
POCT GLUCOSE: 154 MG/DL (ref 70–110)
POCT GLUCOSE: 157 MG/DL (ref 70–110)
POCT GLUCOSE: 173 MG/DL (ref 70–110)
POTASSIUM SERPL-SCNC: 2.9 MMOL/L (ref 3.5–5.1)
POTASSIUM SERPL-SCNC: 3.1 MMOL/L (ref 3.5–5.1)
PROT SERPL-MCNC: 7 G/DL (ref 6–8.4)
PROT SERPL-MCNC: 7.2 G/DL (ref 6–8.4)
RBC # BLD AUTO: 4.04 M/UL (ref 4.6–6.2)
SODIUM SERPL-SCNC: 142 MMOL/L (ref 136–145)
SODIUM SERPL-SCNC: 145 MMOL/L (ref 136–145)
WBC # BLD AUTO: 7.74 K/UL (ref 3.9–12.7)

## 2019-07-19 PROCEDURE — 63600175 PHARM REV CODE 636 W HCPCS: Performed by: GENERAL ACUTE CARE HOSPITAL

## 2019-07-19 PROCEDURE — 25000003 PHARM REV CODE 250: Performed by: GENERAL ACUTE CARE HOSPITAL

## 2019-07-19 PROCEDURE — 84100 ASSAY OF PHOSPHORUS: CPT

## 2019-07-19 PROCEDURE — 80053 COMPREHEN METABOLIC PANEL: CPT | Mod: 91

## 2019-07-19 PROCEDURE — 20600001 HC STEP DOWN PRIVATE ROOM

## 2019-07-19 PROCEDURE — 25000003 PHARM REV CODE 250: Performed by: HOSPITALIST

## 2019-07-19 PROCEDURE — 99232 SBSQ HOSP IP/OBS MODERATE 35: CPT | Mod: ,,, | Performed by: INTERNAL MEDICINE

## 2019-07-19 PROCEDURE — 99232 PR SUBSEQUENT HOSPITAL CARE,LEVL II: ICD-10-PCS | Mod: ,,, | Performed by: INTERNAL MEDICINE

## 2019-07-19 PROCEDURE — 63600175 PHARM REV CODE 636 W HCPCS: Performed by: HOSPITALIST

## 2019-07-19 PROCEDURE — 63600175 PHARM REV CODE 636 W HCPCS: Performed by: PHYSICIAN ASSISTANT

## 2019-07-19 PROCEDURE — 83735 ASSAY OF MAGNESIUM: CPT

## 2019-07-19 PROCEDURE — 85025 COMPLETE CBC W/AUTO DIFF WBC: CPT

## 2019-07-19 PROCEDURE — 36415 COLL VENOUS BLD VENIPUNCTURE: CPT

## 2019-07-19 PROCEDURE — 25000003 PHARM REV CODE 250: Performed by: PHYSICIAN ASSISTANT

## 2019-07-19 RX ADMIN — AMLODIPINE BESYLATE 5 MG: 5 TABLET ORAL at 08:07

## 2019-07-19 RX ADMIN — CEFAZOLIN 6 G: 1 INJECTION, POWDER, FOR SOLUTION INTRAMUSCULAR; INTRAVENOUS at 01:07

## 2019-07-19 RX ADMIN — ENOXAPARIN SODIUM 40 MG: 100 INJECTION SUBCUTANEOUS at 05:07

## 2019-07-19 RX ADMIN — INSULIN ASPART 1 UNITS: 100 INJECTION, SOLUTION INTRAVENOUS; SUBCUTANEOUS at 10:07

## 2019-07-19 RX ADMIN — SODIUM CHLORIDE 0.4 UNITS/HR: 9 INJECTION, SOLUTION INTRAVENOUS at 10:07

## 2019-07-19 RX ADMIN — ATORVASTATIN CALCIUM 80 MG: 20 TABLET, FILM COATED ORAL at 08:07

## 2019-07-19 NOTE — NURSING
Dr Rust returned call blood pressure findings stated to him without any new orders... Please see vitals signs record.

## 2019-07-19 NOTE — PROGRESS NOTES
"[facility]  Endocrinology  Progress Note    Admit Date: 7/12/2019     Reason for Consult: Management of T2DM, Hyperglycemia, DKA    Surgical Procedure and Date: 7/14/19    Diabetes diagnosis year: >20 years, 1990s?    Home Diabetes Medications:  Metformin 500mg BID, Lantus 26U QHS    How often checking glucose at home? None  BG readings on regimen: n/a  Hypoglycemia on the regimen?  No  Missed doses on regimen?  No    Diabetes Complications include:     Hyperglycemia and Foot ulcer      Complicating diabetes co morbidities:  HTN      HPI:   Patient is a 49 y.o. male with a diagnosis of Type 2 DM (noncomplaince, skip Levemir 1-2x a week), HTN, PVD, hx of right foot osteomyelitis who was admitted to hospital medicine for DKA and L foot ulcer. He report  4-5 days of nausea and vomiting. Vomiting is non-bloody. He has not been able to keep anything down. Pt hasn't been administering his insulin during this time due to the fact that he has not been able to keep anything down. He was found to have a large open wound on L foot, pt recently lost his insurance and has been unable to follow up with wound care. Xray showed new Osteomyelitis of L foot.  Pt report skipping his Lantus at home 1-2x a week, not on insulin with meal. Was on Trulicity but stopped due to lost of insurance. BHOB was 4.1, A1C >14, Anion gap of 21, glucose 349. Endocrine was consulted for "DKA", diabetes management.         Interval HPI: Pt not tolerating PO due to uncontrolled GERD.  Glucose level ranging between 123-186 for the past 24hr on insulin transition ggt @ 0.4unit/hr    Overnight events: NO overnight events   Eating:   <25%  Nausea: No  Hypoglycemia and intervention: No  Fever: No  TPN and/or TF: No    BP (!) 148/92 (BP Location: Left arm, Patient Position: Lying)   Pulse 92   Temp 98.1 °F (36.7 °C) (Oral)   Resp 18   Ht 6' 1" (1.854 m)   Wt 84.4 kg (186 lb)   SpO2 95%   BMI 24.54 kg/m²      Labs Reviewed and Include "    Recent Labs   Lab 07/19/19  0844   *   CALCIUM 9.0   ALBUMIN 2.1*   PROT 7.2      K 2.9*   CO2 33*      BUN 6   CREATININE 1.0   ALKPHOS 107   ALT <5*   AST 12   BILITOT 0.2     Lab Results   Component Value Date    WBC 7.74 07/19/2019    HGB 11.3 (L) 07/19/2019    HCT 35.8 (L) 07/19/2019    MCV 89 07/19/2019     07/19/2019     No results for input(s): TSH, FREET4 in the last 168 hours.  Lab Results   Component Value Date    HGBA1C >14.0 (H) 07/12/2019       Nutritional status:   Body mass index is 24.54 kg/m².  Lab Results   Component Value Date    ALBUMIN 2.1 (L) 07/19/2019    ALBUMIN 2.1 (L) 07/18/2019    ALBUMIN 2.0 (L) 07/18/2019     No results found for: PREALBUMIN    Estimated Creatinine Clearance: 98.8 mL/min (based on SCr of 1 mg/dL).    Accu-Checks  Recent Labs     07/17/19  1230 07/17/19  1638 07/17/19  2042 07/18/19  0003 07/18/19  0409 07/18/19  0758 07/18/19  1217 07/18/19  1633 07/18/19  2207 07/19/19  0801   POCTGLUCOSE 175* 191* 186* 164* 163* 123* 150* 155* 156* 157*       Current Medications and/or Treatments Impacting Glycemic Control  Immunotherapy:    Immunosuppressants     None        Steroids:   Hormones (From admission, onward)    None        Pressors:    Autonomic Drugs (From admission, onward)    None        Hyperglycemia/Diabetes Medications:   Antihyperglycemics (From admission, onward)    Start     Stop Route Frequency Ordered    07/15/19 1735  insulin aspart U-100 pen 0-4 Units      -- SubQ Every 4 hours PRN 07/15/19 1636    07/13/19 2045  insulin regular (Humulin R) 100 Units in sodium chloride 0.9% 100 mL infusion      -- IV Continuous 07/13/19 1937          ASSESSMENT and PLAN    Type 2 diabetes mellitus with hyperglycemia, with long-term current use of insulin  -Pt has poorly controlled type 2 DM w/ A1C > 14 due to non-compliance (had insurance issues)  - DKA resolved   -Glucose levels for the past 24hrs ranging between (120-186)   -Glucose level goal  inpatient (140 -180)   -Pt currently @goal on Insulin ggt 0.4unit/hr   -PO NOT TOLERATING PO DUE TO UNCONTROLLED GERD     Plan:   -Pt not tolerating PO duet to uncontrolled GERD, Consider starting H2 blockers or PPI's   -C/w Insulin ggt to 0.4units/hr  -C/w low dose correction insulin qAC and HS   -Accuchecks qAC and HS   -Will switch patient to MDI once tolerating PO diet     Discharge Recommendations: TBD    Acute osteomyelitis  - New L foot OM  - management per primary and Podiatry  - On IV Abx  -Tight glucose control (Goal 140-180)       Diabetic ketoacidosis without coma associated with type 2 diabetes mellitus  - DKA on admission due to noncompliance of insulin use combine with Osteomyelitis of foot  - Now resolved  -Refer to plan above         Nakul Patel MD  Endocrinology  [facility]

## 2019-07-19 NOTE — PLAN OF CARE
Problem: Adult Inpatient Plan of Care  Goal: Plan of Care Review  POC reviewed with patient. Wound vac to left foot, dressing dry and intact.Blood glucose maintainted, orders by MD implemented throughout the shift. IV sites intact without any redness or swelling noted. No c/o at this time. Safety maintained, call light with siderails up x 2.

## 2019-07-19 NOTE — PLAN OF CARE
07/19/19 1553   Post-Acute Status   Post-Acute Authorization Placement   Post-Acute Placement Status Referrals Sent     SW received a [facility] reporting that [facility] referral is declined. ALIYAH sent a referral to [facility] and will follow up. ALIYAH also faxed the wound vac form to [facility] and will follow up.    Josefina Hammond LMSW  [facility]   [phone]

## 2019-07-19 NOTE — PLAN OF CARE
Problem: Adult Inpatient Plan of Care  Goal: Plan of Care Review  Outcome: Ongoing (interventions implemented as appropriate)     07/19/19 8866   Plan of Care Review   Plan of Care Reviewed With patient;family   Progress no change   POC reviewed with Pt and family. No acute events overnight. AAOx4. Denied pain, stated mild discomfort, tylenol offered but refused. Wound vac in place, dressing c/d/i. Insulin gtt infusing at 0.4u/hr, no coverage required. IVF infusing at 75ml/hr. Ancef infusing continuously at 20.8ml/hr. Up with assist to BSC. Vitals stable. Safety maintained. Will continue to monitor.

## 2019-07-19 NOTE — ASSESSMENT & PLAN NOTE
-Pt has poorly controlled type 2 DM w/ A1C > 14 due to non-compliance (had insurance issues)  - DKA resolved   -Glucose levels for the past 24hrs ranging between (120-186)   -Glucose level goal inpatient (140 -180)   -Pt currently @goal on Insulin ggt 0.4unit/hr   -PO NOT TOLERATING PO DUE TO UNCONTROLLED GERD     Plan:   -PT NOT TOLERATING PO DUE TO UNCONTROLLED GERD, Consider starting H2 blockers or PPI's   -C/w Insulin ggt to 0.4units/hr  -C/w low dose correction insulin qAC and HS   -Accuchecks qAC and HS   -Will switch patient to MDI once tolerating PO diet     Discharge Recommendations: TBD

## 2019-07-19 NOTE — PLAN OF CARE
to the bedside at this time to see the patient.  Patient's mother at the bedside at this time.  They are aware the  team continues to follow throughout this admission for discharge needs and/or recommendations.  Case     07/19/19 1205   Discharge Reassessment   Assessment Type Discharge Planning Reassessment   Provided patient/caregiver education on the expected discharge date and the discharge plan Yes   Do you have any problems affording any of your prescribed medications? Yes   If yes, what medications? All of them.   Discharge Plan A Other  (Home with home infusions)   Discharge Plan B Home with family   DME Needed Upon Discharge    (TBD)   Patient choice form signed by patient/caregiver Yes   Anticipated Discharge Disposition IV Therapy   Can the patient answer the patient profile reliably? Yes, cognitively intact   How does the patient rate their overall health at the present time? Fair    manager name and number remains on the board at the bedside for the patient or the family to call with discharge needs or questions.  Understanding verbalized.

## 2019-07-19 NOTE — PROGRESS NOTES
[facility]  Infectious Disease  Progress Note    Patient Name: Indio Ryder Jr.  MRN: [id number]  Admission Date: 7/12/2019  Length of Stay: 6 days  Attending Physician: Luis Rust MD  Primary Care Provider: Lorena Thakur MD    Isolation Status: No active isolations  Assessment/Plan:      * Bacteremia due to group B Streptococcus     49 year old male with uncontrolled DM and HTN admitted in DKA.  Also had left foot wound and Group B strep bacteremia.  Initial wound cultures grew GBS and MSSA, finegoldia      Per podiatry note, there was necrosis and purulence tracking down to bone and patient underwent I&D with podiatry 7/14; left 5th met head noted to be spongy. Cultures from met head with Group B strep and now peptoniphilus       He underwent repeat I&D and left partial 5th ray amputation 7/17.   Per OP note,  purulent drainage was expressed proximal to distal from the level of the plantar aspect of the left calcaneus to the open wound on the plantar midfoot. Purulent drainage was also manually expressed from the plantar distal aspect of the forefoot. Cultures of this were sent.   A small portion of the distal metatarsal was reported as sent as a clean margin to Micro and pathology.  The wound was partially closed, Neox allograft and wound Vac placed.       Repeat blood cultures remain NGTD.   ABIs without significant PAD.  Afebrile, no leukocytosis.     Plan:  1. Continue cefazolin 6 gram IV continuous infusion (for group B strep and MSSA).  Finegoldia and peptoniphilus grew from debrided/removed tissue, would not treat at this time unless grows from clean margins.   2. Will follow clean margins for culture and pathology.  Note that though clean margins noted to be in process, unclear if sample was received in micro.  Need to confirm.    3.  If all infection removed in the OR and clean margins negative, anticipate 2 weeks of IV antibiotics for bacteremia and skin and soft tissue  infection from date of surgery, 7/17.   If there is concern for residual osteomyelitis, will need 6 weeks of IV therapy.  Can follow up clean margin cultures and pathology as outpatient if not resulted before discharge.  Would therefore plan on minimum of 14 days of abx,  but would continue IV until seen in ID follow up, with ultimate duration to be determined then.   3. Will need PICC line prior to discharge  4. Weekly cbc, cmp, crp, esr and fax results to ID at [phone].  5. We will arrange for a follow-up appointment in Infectious Diseases clinic in 2 weeks.  6.  Prior to discharge, please ensure the patient's follow-up has been scheduled.  If there is still no follow-up scheduled in Infectious Diseases clinic, please send an EPIC message to the Infectious Diseases charge nurse Rhoda Martinez.  7.  Will sign off.  Please call or re-consult as needed.     Data reviewed and plan discussed with ID staff    Acute osteomyelitis  See assessment/plan above.  POD#1 partial 5th ray amputation.  Clean margins pending    Thank you.   Please call for any questions or concerns.  JESUS Harper, ANP-C  [phone]  Subjective:     Principal Problem:Bacteremia due to group B Streptococcus    HPI: This is a 49 year old male with a PMH of DM (poorly controlled, with long term insulin use) and HTN who presented to the ED with nausea, vomiting, and foot ulcer. He reports 4-5 days of nausea and vomiting. Denies abdominal pain.  he also has associated chills but has not taken his temperature.  He has been managing his right foot ulcer at home with dressing but over the past 2 weeks an ulcer on his left foot has developed. He denies any pain or injury- has chronic numbness of bilateral lower extremities and hands from diabetic neuropathy.  Found to be in DKA in the emergency department and started on insulin drip. Per podiatry, the wound was necrotic with fluctuance and purulence down to bone. Patient febrile to 101.4 with  leukocytosis of 14,000. His blood cultures now positive for group B strep. Wound culture also with GBS. Pt on empiric vancomycin and zosyn. ID consulted for recs.     Patient reports he still is feeling bad. No more vomiting. Family at bedside.       Interval History: No acute events overnight.  POD#1 partial 5th ray amputation.    Afebrile.  No leukocytosis.  Blood cultures remain negative.  Primary complaint is nausea, though somewhat better today.      Review of Systems   Constitutional: Positive for appetite change. Negative for chills and fever.   Respiratory: Negative for cough and shortness of breath.    Cardiovascular: Negative for chest pain and leg swelling.   Gastrointestinal: Positive for nausea. Negative for abdominal pain, constipation, diarrhea and vomiting.   Genitourinary: Negative for dysuria, frequency and hematuria.   Musculoskeletal: Negative for back pain and myalgias.   Skin: Positive for color change and wound (left foot surgical wound; right foot plantar ulcer). Negative for rash.   Neurological: Negative for dizziness, light-headedness and headaches.   Psychiatric/Behavioral: Negative for agitation and behavioral problems. The patient is not nervous/anxious.      Objective:     Vital Signs (Most Recent):  Temp: 97.8 °F (36.6 °C) (07/18/19 0719)  Pulse: 98 (07/18/19 0809)  Resp: 15 (07/18/19 0719)  BP: 136/86 (07/18/19 0719)  SpO2: 95 % (07/18/19 0719) Vital Signs (24h Range):  Temp:  [96.9 °F (36.1 °C)-98.7 °F (37.1 °C)] 97.8 °F (36.6 °C)  Pulse:  [] 98  Resp:  [11-18] 15  SpO2:  [95 %-100 %] 95 %  BP: (136-175)/() 136/86     Weight: 84.4 kg (186 lb)  Body mass index is 24.54 kg/m².    Estimated Creatinine Clearance: 89.8 mL/min (based on SCr of 1.1 mg/dL).    Physical Exam   Constitutional: He is oriented to person, place, and time. He appears well-developed and well-nourished. No distress.   Cardiovascular: Normal rate and regular rhythm.   No murmur heard.  Pulmonary/Chest:  Effort normal and breath sounds normal. No respiratory distress.   Abdominal: Soft. Bowel sounds are normal. He exhibits no distension.   Musculoskeletal: Normal range of motion. He exhibits no edema.   Dressing in place to bilateral feet. Wound vac left foot.    Neurological: He is alert and oriented to person, place, and time.   Skin: Skin is warm and dry.   No ascending erythema or warmth.     Psychiatric: He is withdrawn. He exhibits a depressed mood.       Significant Labs:   Blood Culture:   Recent Labs   Lab 07/12/19  1044 07/13/19  0358 07/14/19  0629 07/15/19  0305   LABBLOO Gram stain mary bottle: Gram positive cocci in chains resembling Strep  Results called to and read back by:Farrah Ayala RN 07/12/2019    22:47  Gram stain aer bottle: Gram positive cocci in chains resembling Strep   07/13/2019  06:46  STREPTOCOCCUS AGALACTIAE (GROUP B)  Beta-hemolytic streptococci are routinely susceptible to   penicillins,cephalosporins and carbapenems.  For susceptibility see order #[id number]  *  Gram stain mary bottle: Gram positive cocci in chains resembling Strep  Results called to and read back by:Farrah Ayala RN 07/12/2019    22:46  Gram stain aer bottle: Gram positive cocci in chains resembling Strep   07/13/2019  06:43  STREPTOCOCCUS AGALACTIAE (GROUP B)  Beta-hemolytic streptococci are routinely susceptible to   penicillins,cephalosporins and carbapenems.  * No growth after 5 days.  No growth after 5 days. No Growth to date  No Growth to date  No Growth to date  No Growth to date No Growth to date  No Growth to date  No Growth to date  No Growth to date  No Growth to date  No Growth to date  No Growth to date  No Growth to date     CBC:   Recent Labs   Lab 07/17/19  0332 07/18/19  0416   WBC 8.45 8.12   HGB 11.7* 10.1*   HCT 38.8* 33.1*    332     CMP:   Recent Labs   Lab 07/17/19  0746 07/17/19  1942 07/18/19  0805    148* 148*   K 3.2* 3.3* 3.0*    105 107    CO2 30* 32* 31*   * 192* 138*   BUN 7 6 6   CREATININE 1.4 1.2 1.1   CALCIUM 9.0 8.7 8.7   PROT 6.9 7.1 6.8   ALBUMIN 1.9* 2.0* 2.0*   BILITOT 0.2 0.2 0.2   ALKPHOS 105 103 99   AST 8* 10 10   ALT <5* <5* <5*   ANIONGAP 11 11 10   EGFRNONAA 57.8* >60.0 >60.0     Wound Culture:   Recent Labs   Lab 07/12/19  1257 07/14/19  1728 07/17/19  1214   LABAERO STREPTOCOCCUS AGALACTIAE (GROUP B)  Few  Beta-hemolytic streptococci are routinely susceptible to   penicillins,cephalosporins and carbapenems.  Susceptibility testing not routinely performed  *  STAPHYLOCOCCUS AUREUS  Few  * STREPTOCOCCUS AGALACTIAE (GROUP B)  Rare  Beta-hemolytic streptococci are routinely susceptible to   penicillins,cephalosporins and carbapenems.  Susceptibility testing not routinely performed  Skin janes also present  * No growth       Significant Imaging: I have reviewed all pertinent imaging results/findings within the past 24 hours.

## 2019-07-19 NOTE — PROGRESS NOTES
Progress Note  Hospital Medicine    Admit Date: 7/12/2019  Length of Stay:  LOS: 7 days     SUBJECTIVE:         Follow-up For:  Bacteremia due to group B Streptococcus    HPI/Interval history (See H&P for complete P,F,SHx) :   Over view  Indio Ryder Jr. is a 49 y.o. male with a PMH of T2DM (poorly  controlled, with long term insulin use) and HTN who presented to the ED on 7/12/2019 diagnosed with  Vomiting (vomiting for past 4 days, denies sick contacts)   Oriented x3 accompanied by daughter at the bedside.  Mentions that he wears lower extremity stockings for edema. Reportedly formed a new ulcer on the left lateral aspect of the foot about 10 days back.  had 4-5 days of nausea and vomiting. Vomiting is non-bloody, No abdominal pain..  It occurs immediately after eating and he has not been able to keep anything down. Associated with fevers and chills but no objective temperature measurement.  At baseline only takes insulin once a day stop taking more than a week because of nausea and vomiting and poor oral intake.  Does not check fingersticks regularly at home. recently lost his insurance and has been unable to follow up with wound care.  His last follow-up with primary care provider and podiatrist was about 2 years.  He has been managing his right foot ulcer at home with dressing but over the past 2 weeks an ulcer on his left foot has developed. He denies any pain or injury- has chronic numbness of bilateral lower extremities and hands from diabetic neuropathy.  Found to be in DKA in the emergency department with beta hydroxybutyrate elevated at 4.1.  Started on insulin drip    07/13/2019   Temperature of 101.5 last night.  Anion gap resolved- bicarbonate 23, insulin drip discontinued.  Started on Levemir 23 units q.a.m. 7 units as part t.i.d. with meals with low-dose sliding scale.  Diabetic diet and NPO from midnight for possible intervention.  MRI of the foot- Ulceration involving the lateral plantar  aspect of the distal left foot, with findings concerning for exposed bone involving the distal aspect of the 5th metatarsal and proximal phalange of the 5th toe.  There are associated changes concerning for osteomyelitis involving the distal aspect of the 5th metatarsal and entirety of the right toe.  There is osseous hyperemia involving the proximal phalange of the 2nd toe, early changes of osteomyelitis are a consideration . aerobic culture  and blood culture with Group B strep. discontinued vancomycin       07/14/2019  Had urinary retention of 800 mL but spontaneously voided.  Started on transitional insulin drip as NPO from midnight for OR today.  Hypokalemia repeat.  Blood cultures daily until clear.  Aerobic cultures with group B Streptococcus and Staph aureus.  Added vancomycin ( monitor for toxicity)    Interval history   s/p I&D and 5th ray amputation in the OR  on  07/14/2019.  ALISSA ordered to evaluate vascular status intraoperative Cultures pending . clean margin cultures and pathology pending. Deescalated from vanc and zosyn to cefazolin 6 gram IV cont infusion.  blood cultures from 07/13/2019 no growth.  Continues to have nausea vomiting since unable to tolerate anything by mouth.  Started on IV hydration and Reglan    Review of Systems: List if applicable  Constitutional: Positive for activity change, appetite change (Poor appetite for the last and), chills, fatigue, fever ( weight loss) and unexpected weight change.   HENT: Negative for congestion, ear discharge, ear pain, facial swelling and sore throat.    Eyes: Negative for pain, discharge and itching.   Respiratory: Positive for shortness of breath ( at rest and exertion ). Negative for cough, chest tightness and wheezing.    Cardiovascular: Positive for leg swelling ( chronic). Negative for chest pain and palpitations.   Gastrointestinal: Positive for vomiting. Negative for abdominal distention, abdominal pain, blood in stool, constipation and  diarrhea.   Endocrine: Negative for cold intolerance.   Genitourinary: Negative for dysuria, hematuria and urgency.   Musculoskeletal: Negative for arthralgias, gait problem, myalgias, neck pain and neck stiffness.   Skin: Negative for color change, pallor and rash.   Allergic/Immunologic: Negative for environmental allergies.   Neurological: Positive for weakness and numbness ( bilateral lower extremities and hands attributes to diabetic neuropathy). Negative for dizziness, syncope, light-headedness and headaches.   Hematological: Negative for adenopathy.   Psychiatric/Behavioral: Negative for agitation, behavioral problems and confusion.     OBJECTIVE:     Vital Signs Range (Last 24H):  Temp:  [97 °F (36.1 °C)-98.4 °F (36.9 °C)]   Pulse:  []   Resp:  [18]   BP: (142-165)/(83-97)   SpO2:  [94 %-97 %]     Physical Exam:  Constitutional: He is oriented to person, place, and time. He appears well-developed and well-nourished.   HENT:   Head: Normocephalic and atraumatic.   Mouth/Throat: Oropharynx is clear and moist. No oropharyngeal exudate.   Eyes: Pupils are equal, round, and reactive to light. Conjunctivae and EOM are normal. Right eye exhibits no discharge. Left eye exhibits no discharge. No scleral icterus.   Neck: Normal range of motion. Neck supple. No JVD present. No tracheal deviation present. No thyromegaly present.   Cardiovascular: Normal rate, regular rhythm and normal heart sounds. Exam reveals no gallop and no friction rub.   No murmur heard.  Pulmonary/Chest: Effort normal and breath sounds normal. No stridor. No respiratory distress. He has no wheezes. He has no rales.   Abdominal: Soft. Bowel sounds are normal. He exhibits no distension and no mass. There is no tenderness. There is no guarding.   Musculoskeletal: Normal range of motion. He exhibits edema.   Lymphadenopathy:     He has no cervical adenopathy.   Neurological: He is oriented to person, place, and time. No cranial nerve deficit.    Able to move upper and lower extremities without limitation   Skin: Skin is warm and dry.   Left foot: 6cm ulcer with exposed subcutaneous fat and eschar with foul smelling, purulent drainage overlying lateral aspect of left 5th MTP.     Right foot: 1.5cm clean based ulcer at base of right 5th MTP joint. Right great toe and second toe surgically amputated.     Psychiatric: He has a normal mood and affect. His behavior is normal.   Nursing note and vitals reviewed    Medications:  Medication list was reviewed and changes noted under Assessment/Plan.      Current Facility-Administered Medications:     0.9%  NaCl infusion, , Intravenous, Continuous, Vicente Wick MD, Last Rate: 75 mL/hr at 07/18/19 2208    acetaminophen tablet 650 mg, 650 mg, Oral, Q6H PRN, Vicente Wick MD, 650 mg at 07/17/19 0458    amLODIPine tablet 5 mg, 5 mg, Oral, Daily, Vicente Wick MD, 5 mg at 07/19/19 0807    atorvastatin tablet 80 mg, 80 mg, Oral, Daily, Vicente Wick MD, 80 mg at 07/19/19 0806    calcium carbonate 200 mg calcium (500 mg) chewable tablet 1,000 mg, 1,000 mg, Oral, BID PRN, Antonio Tate PA-C, 1,000 mg at 07/14/19 1850    ceFAZolin (ANCEF) 6 g in dextrose 5 % 500 mL CONTINUOUS INFUSION, 6 g, Intravenous, Q24H, RAMILA Kinsey, 6 g at 07/18/19 1320    dextrose 10% (D10W) Bolus, 12.5 g, Intravenous, PRN, Vicente Wick MD    dextrose 10% (D10W) Bolus, 25 g, Intravenous, PRN, Vicente Wick MD    enoxaparin injection 40 mg, 40 mg, Subcutaneous, Daily, Vicente Wick MD, 40 mg at 07/18/19 1635    glucagon (human recombinant) injection 1 mg, 1 mg, Intramuscular, PRN, Roselyn Chow MD    glucose chewable tablet 16 g, 16 g, Oral, PRN, Roselyn Chow MD    glucose chewable tablet 24 g, 24 g, Oral, PRN, Roselyn Chow MD    hydrALAZINE tablet 25 mg, 25 mg, Oral, Q8H PRN, Vicente Wick MD, 25 mg at 07/17/19 1422    insulin aspart U-100 pen 0-4 Units, 0-4 Units, Subcutaneous, Q4H  PRN, Nakul Patel MD, 1 Units at 07/17/19 1640    insulin regular (Humulin R) 100 Units in sodium chloride 0.9% 100 mL infusion, 0.9 Units/hr, Intravenous, Continuous, Nakul Patel MD, Last Rate: 0.4 mL/hr at 07/18/19 0802, 0.4 Units/hr at 07/18/19 0802    ondansetron injection 8 mg, 8 mg, Intravenous, Q8H PRN, Zeynep Duke PA-C    promethazine (PHENERGAN) 12.5 mg in dextrose 5 % 50 mL IVPB, 12.5 mg, Intravenous, Q6H PRN, Zeynep Duke PA-C, Last Rate: 150 mL/hr at 07/18/19 0012, 12.5 mg at 07/18/19 0012    sodium chloride 0.9% flush 10 mL, 10 mL, Intravenous, PRN, Vicente Wick MD    sodium chloride 0.9% flush 10 mL, 10 mL, Intravenous, PRN, Mariela Damon MD    acetaminophen, calcium carbonate, Dextrose 10% Bolus, Dextrose 10% Bolus, glucagon (human recombinant), glucose, glucose, hydrALAZINE, insulin aspart U-100, ondansetron, promethazine (PHENERGAN) IVPB, sodium chloride 0.9%, sodium chloride 0.9%    Laboratory/Diagnostic Data:  Reviewed and noted in plan where applicable- Please see chart for full lab data.    Recent Labs   Lab 07/17/19  0332 07/18/19 0416 07/19/19 0337   WBC 8.45 8.12 7.74   HGB 11.7* 10.1* 11.3*   HCT 38.8* 33.1* 35.8*    332 350       Recent Labs   Lab 07/17/19  0332  07/18/19  0416 07/18/19  0805 07/18/19 1959 07/19/19 0337 07/19/19  0844   NA  --    < >  --  148* 146*  --  145   K  --    < >  --  3.0* 3.2*  --  2.9*   CL  --    < >  --  107 103  --  101   CO2  --    < >  --  31* 30*  --  33*   BUN  --    < >  --  6 5*  --  6   CREATININE  --    < >  --  1.1 1.0  --  1.0   GLU  --    < >  --  138* 156*  --  153*   CALCIUM  --    < >  --  8.7 8.7  --  9.0   MG 2.0  --  1.7  --   --  1.7  --    PHOS 2.6*  --  2.8  --   --  2.4*  --     < > = values in this interval not displayed.       Recent Labs   Lab 07/18/19  0805 07/18/19 1959 07/19/19  0844   ALKPHOS 99 112 107   ALT <5* <5* <5*   AST 10 14 12   ALBUMIN 2.0* 2.1* 2.1*   PROT 6.8 7.1 7.2    BILITOT 0.2 0.2 0.2        Microbiology labs for the last week  Microbiology Results (last 7 days)     Procedure Component Value Units Date/Time    Aerobic culture [[id number]] Collected:  07/17/19 1214    Order Status:  Completed Specimen:  Wound from Foot, Left Updated:  07/19/19 1106     Aerobic Bacterial Culture Skin janes,  no predominant organism    Narrative:       Left foot wound    Culture, Anaerobe [[id number]] Collected:  07/17/19 1214    Order Status:  Completed Specimen:  Wound from Foot, Left Updated:  07/19/19 1054     Anaerobic Culture Culture in progress    Narrative:       Left foot wound    Blood culture [[id number]] Collected:  07/14/19 0629    Order Status:  Completed Specimen:  Blood Updated:  07/19/19 1012     Blood Culture, Routine No growth after 5 days.    Blood culture [[id number]] Collected:  07/15/19 0305    Order Status:  Completed Specimen:  Blood from Antecubital, Right Updated:  07/19/19 0822     Blood Culture, Routine No Growth to date      No Growth to date      No Growth to date      No Growth to date      No Growth to date    Blood culture [[id number]] Collected:  07/15/19 0305    Order Status:  Completed Specimen:  Blood from Antecubital, Right Updated:  07/19/19 0822     Blood Culture, Routine No Growth to date      No Growth to date      No Growth to date      No Growth to date      No Growth to date    AFB Culture & Smear [[id number]] Collected:  07/17/19 1214    Order Status:  Completed Specimen:  Wound from Foot, Left Updated:  07/18/19 2127     AFB Culture & Smear Culture in progress     AFB CULTURE STAIN No acid fast bacilli seen.    Narrative:       Left foot wound    Culture, Anaerobe [[id number]]  (Abnormal) Collected:  07/14/19 1728    Order Status:  Completed Specimen:  Wound from Toe, Left Foot Updated:  07/18/19 1407     Anaerobic Culture PEPTONIPHILUS HAREI  Few      Narrative:       Left 5th metatarsal    Blood culture [[id number]] Collected:  07/13/19 0358    Order  Status:  Completed Specimen:  Blood Updated:  07/18/19 0612     Blood Culture, Routine No growth after 5 days.    Blood culture [[id number]] Collected:  07/13/19 0358    Order Status:  Completed Specimen:  Blood Updated:  07/18/19 0612     Blood Culture, Routine No growth after 5 days.    Culture, Anaerobe [[id number]] Collected:  07/17/19 1659    Order Status:  Sent Specimen:  Tissue from Foot, Left Updated:  07/17/19 1659    Aerobic culture [[id number]] Collected:  07/17/19 1659    Order Status:  Sent Specimen:  Tissue from Foot, Left Updated:  07/17/19 1659    Fungus culture [[id number]] Collected:  07/17/19 1659    Order Status:  Sent Specimen:  Tissue from Foot, Left Updated:  07/17/19 1659    Gram stain [[id number]] Collected:  07/17/19 1659    Order Status:  Sent Specimen:  Tissue from Foot, Left Updated:  07/17/19 1659    AFB Culture & Smear [[id number]] Collected:  07/17/19 1659    Order Status:  Sent Specimen:  Tissue from Foot, Left Updated:  07/17/19 1659    Gram stain [[id number]] Collected:  07/17/19 1214    Order Status:  Completed Specimen:  Wound from Foot, Left Updated:  07/17/19 1403     Gram Stain Result No WBC's      No organisms seen    Narrative:       Left foot wound    Fungus culture [[id number]] Collected:  07/17/19 1214    Order Status:  Sent Specimen:  Wound from Foot, Left Updated:  07/17/19 1228    Culture, Anaerobe [[id number]]  (Abnormal) Collected:  07/12/19 1257    Order Status:  Completed Specimen:  Wound from Foot, Left Updated:  07/16/19 1436     Anaerobic Culture FINEGOLDIA MAGNA  Many      Aerobic culture [[id number]]  (Abnormal) Collected:  07/14/19 1728    Order Status:  Completed Specimen:  Wound from Toe, Left Foot Updated:  07/16/19 0850     Aerobic Bacterial Culture STREPTOCOCCUS AGALACTIAE (GROUP B)  Rare  Beta-hemolytic streptococci are routinely susceptible to   penicillins,cephalosporins and carbapenems.  Susceptibility testing not routinely performed  Skin janes also present       Narrative:       Left 5th metatarsal    Aerobic culture [[id number]]  (Abnormal)  (Susceptibility) Collected:  07/12/19 1257    Order Status:  Completed Specimen:  Wound from Foot, Left Updated:  07/15/19 0953     Aerobic Bacterial Culture STREPTOCOCCUS AGALACTIAE (GROUP B)  Few  Beta-hemolytic streptococci are routinely susceptible to   penicillins,cephalosporins and carbapenems.  Susceptibility testing not routinely performed        STAPHYLOCOCCUS AUREUS  Few      Blood culture x two cultures. Draw prior to antibiotics. [[id number]]  (Abnormal) Collected:  07/12/19 1044    Order Status:  Completed Specimen:  Blood from Peripheral, Antecubital, Right Updated:  07/14/19 0947     Blood Culture, Routine Gram stain mary bottle: Gram positive cocci in chains resembling Strep      Results called to and read back by:Farrah Ayala RN 07/12/2019        22:47      Gram stain aer bottle: Gram positive cocci in chains resembling Strep       07/13/2019  06:46      STREPTOCOCCUS AGALACTIAE (GROUP B)  Beta-hemolytic streptococci are routinely susceptible to   penicillins,cephalosporins and carbapenems.  For susceptibility see order #[id number]      Narrative:       Aerobic and anaerobic    Blood culture x two cultures. Draw prior to antibiotics. [[id number]]  (Abnormal)  (Susceptibility) Collected:  07/12/19 1044    Order Status:  Completed Specimen:  Blood from Peripheral, Antecubital, Left Updated:  07/14/19 0947     Blood Culture, Routine Gram stain mary bottle: Gram positive cocci in chains resembling Strep      Results called to and read back by:Farrah Ayala RN 07/12/2019        22:46      Gram stain aer bottle: Gram positive cocci in chains resembling Strep       07/13/2019  06:43      STREPTOCOCCUS AGALACTIAE (GROUP B)  Beta-hemolytic streptococci are routinely susceptible to   penicillins,cephalosporins and carbapenems.      Narrative:       Aerobic and anaerobic           Imaging Results          MRI Foot  (Forefoot) Left Without Contrast (Final result)  Result time 07/12/19 19:07:38    Final result by Bob Oglesby MD (07/12/19 19:07:38)                 Impression:      Ulceration involving the lateral plantar aspect of the distal left foot, with findings concerning for exposed bone involving the distal aspect of the 5th metatarsal and proximal phalange of the 5th toe.  There are associated changes concerning for osteomyelitis involving the distal aspect of the 5th metatarsal and entirety of the right toe.  There is osseous hyperemia involving the proximal phalange of the 2nd toe, early changes of osteomyelitis are a consideration no definite low signal on T1 at this time.      Electronically signed by: Bob Oglesby MD  Date:    07/12/2019  Time:    19:07             Narrative:    EXAMINATION:  MRI FOOT (FOREFOOT) LEFT WITHOUT CONTRAST    CLINICAL HISTORY:  Open wound of ankle, foot and toes;    TECHNIQUE:  Multiplanar multisequence MRI images of the left forefoot were obtained without administration of IV contrast.    COMPARISON:  Radiograph 07/12/2019    FINDINGS:  There is soft tissue ulceration involving the lateral plantar aspect of the right foot, noting there appears to be exposed bone in the location, likely involving the distal aspect of the 5th metatarsal head, and proximal phalange of the 5th toe. There is diffusely increased STIR signal within the distal aspect of the 5th metatarsal, and throughout the phalanges of the 5th toe. There is corresponding low T1 signal involving the same regions, concerning for osteomyelitis. There is additional abnormal STIR signal within the base of the proximal phalange of the 2nd toe, without convincing low signal on T1, suggesting osseous hyperemia although early changes of osteomyelitis remain a consideration. There is edema and induration about the open wound, no convincing focal organized fluid collection.  The remainder of the osseous structures are grossly  unremarkable. No significant joint effusions.  There is reactive edema about the intrinsic musculature of the foot, as well as along the dorsal surface.                                X-Ray Foot Complete Left (Final result)  Result time 07/12/19 11:06:16    Final result by Chandan Montana MD (07/12/19 11:06:16)                 Impression:      Abnormal examination with findings strongly suggesting osteomyelitis involving the base of the proximal phalanx of the left 5th toe and likely the head of the 5th metatarsal as well.    This report was flagged in Epic as abnormal.      Electronically signed by: Chandan Montana MD  Date:    07/12/2019  Time:    11:06             Narrative:    EXAMINATION:  XR FOOT COMPLETE 3 VIEW LEFT    TECHNIQUE:  Three views of the left foot were obtained, with AP, lateral, and oblique projections submitted.    COMPARISON:  No relevant comparison examinations are currently available.    FINDINGS:  Focal soft tissue loss/irregularity involving the region of the left 5th MTP joint is observed, with gas present within the soft tissues at this level, the findings likely related to a clinically evident ulceration.  There is focal lytic bone destruction involving the base of the proximal phalanx of the 5th toe, and possibly involving the head of the 5th metatarsal as well, this radiographic appearance strongly suggesting osteomyelitis.  The remaining osseous structures appear intact, with no evidence of recent fracture and no additional areas of focal lytic bone destruction noted.  Some arterial vascular calcification in the soft tissues about the ankle is incidentally observed.                               X-Ray Chest AP Portable (Final result)  Result time 07/12/19 10:48:19    Final result by Chandan Montana MD (07/12/19 10:48:19)                 Impression:      No significant intrathoracic abnormality.  Allowing for differences in projection and a poorer inspiratory depth level on the current  "examination, there has been no significant detrimental interval change in the appearance of the chest since 05/03/2016.      Electronically signed by: Chandan Montana MD  Date:    07/12/2019  Time:    10:48             Narrative:    EXAMINATION:  XR CHEST AP PORTABLE    CLINICAL HISTORY:  hyperglycemia;    COMPARISON:  Comparison is made to the most recent prior chest radiograph, dated 05/03/2016.    FINDINGS:  Heart size is normal, as is the appearance of the pulmonary vascularity.  Lung zones are clear, and free of significant airspace consolidation or volume loss.  No pleural fluid.  No abnormal mediastinal widening.  No pneumothorax.  Incidental note is made of some spurring at the right acromioclavicular joint level.                                Estimated body mass index is 24.54 kg/m² as calculated from the following:    Height as of this encounter: 6' 1" (1.854 m).    Weight as of this encounter: 84.4 kg (186 lb).    I & O (Last 24H):    Intake/Output Summary (Last 24 hours) at 7/19/2019 1143  Last data filed at 7/19/2019 0930  Gross per 24 hour   Intake --   Output 3450 ml   Net -3450 ml       Estimated Creatinine Clearance: 98.8 mL/min (based on SCr of 1 mg/dL).    ASSESSMENT/PLAN:     Active Problems:    Active Diagnoses:     Diagnosis Date Noted POA    PRINCIPAL PROBLEM:  Acute osteomyelitis [M86.10] x-ray left foot - strongly suggesting osteomyelitis involving the base of the proximal phalanx of the left 5th toe and likely the head of the 5th metatarsal as well.        recently lost his insurance and has been unable to follow up with wound care.  His last follow-up with primary care provider and podiatrist was about 2 years.  He has been managing his right foot ulcer at home with dressing but over the past 2 weeks an ulcer on his left foot has developed. He denies any pain or injury- has chronic numbness of bilateral lower extremities and hands from diabetic neuropathy.  Started on IV Zosyn and vanc.  " Podiatry and Infectious Disease consult.  aerobic culture with Group B strep.  Add vancomycin for Staph aureus  (monitor for toxicity)    07/14/2019   s/p I&D and 5th ray amputation in the OR  on  07/14/2019.  No weight-bearing left lower x-ray ALISSA ordered to evaluate vascular status intraoperative Cultures pending . clean margin cultures and pathology pending. Deescalated from vanc and zosyn to cefazolin 6 gram IV cont infusion.  blood cultures from 07/13/2019 no growth.  Continues to have nausea vomiting since unable to tolerate anything by mouth.  Started on IV hydration and Reglan 07/12/2019 Yes    Sepsis [A41.9] /bacteremia - blood cultures from 07/12/2019 group B Streptococcus. secondary to diabetic foot ulcer.  Elevated ESR greater than 120 As above 07/12/2019 Yes    Leukocytosis [D72.829] 14 secondary to sepsis. resolved  07/12/2019 Unknown    Anemia [D64.9] hemoglobin at 12 10.8 with IV hydration-->, normocytic.  Monitor 07/12/2019 Unknown    Type 2 diabetes mellitus with left eye affected by mild nonproliferative retinopathy without macular edema, without long-term current use of insulin [E11.3292]At baseline only takes insulin once a day stopped  taking more than a week because of nausea and vomiting and poor oral intake.  Does not check fingersticks regularly at home. recently lost his insurance.  Obtain HbA1c elevated greater than 14.  Continue insulin drip.  Endocrine evaluation 08/11/2017 Yes       Chronic    Diabetic ketoacidosis without coma associated with type 2 diabetes mellitus [E11.10]  Found to be in DKA in the emergency department with beta hydroxybutyrate elevated at 4.1.  Started on insulin drip.  Received normal saline in the emergency department    07/13/2019  Anion gap resolved- bicarbonate 23, insulin drip discontinued.  Started on    Started on transitional insulin drip as NPO from midnight. Start Levemir 14 units qhs at  9pm and Novolog 5 units with meals        Hypokalemia-hypomagnesemia replaced    Hypophosphatemia- placed   05/30/2017 Unknown    Type 2 diabetes mellitus with diabetic neuropathy, with long-term current use of insulin [E11.40, Z79.4] 05/03/2016 Not Applicable       Chronic    Mixed hyperlipidemia [E78.2] continue with Lipitor 08/12/2014 Yes    Essential hypertension [I10] blood pressure controlled without medications.  Monitor  06/06/2013 Yes       Chronic               Disposition- home    DVT prophylaxis addressed with:  Brian Rust M.D.

## 2019-07-19 NOTE — PLAN OF CARE
07/19/19 1245   Post-Acute Status   Post-Acute Authorization Placement   Other Status See Comments     Patient currently has [employer] current information on the face sheet. SW was declined by [facility] for antibiotics will send a referral to [facility].       Josefina Hammond LMSW  [facility]   [id number]

## 2019-07-19 NOTE — SUBJECTIVE & OBJECTIVE
"Interval HPI: Pt not tolerating PO due to uncontrolled GERD.  Glucose level ranging between 123-186 for the past 24hr on insulin transition ggt @ 0.4unit/hr    Overnight events: NO overnight events   Eating:   <25%  Nausea: No  Hypoglycemia and intervention: No  Fever: No  TPN and/or TF: No    BP (!) 148/92 (BP Location: Left arm, Patient Position: Lying)   Pulse 92   Temp 98.1 °F (36.7 °C) (Oral)   Resp 18   Ht 6' 1" (1.854 m)   Wt 84.4 kg (186 lb)   SpO2 95%   BMI 24.54 kg/m²     Labs Reviewed and Include    Recent Labs   Lab 07/19/19  0844   *   CALCIUM 9.0   ALBUMIN 2.1*   PROT 7.2      K 2.9*   CO2 33*      BUN 6   CREATININE 1.0   ALKPHOS 107   ALT <5*   AST 12   BILITOT 0.2     Lab Results   Component Value Date    WBC 7.74 07/19/2019    HGB 11.3 (L) 07/19/2019    HCT 35.8 (L) 07/19/2019    MCV 89 07/19/2019     07/19/2019     No results for input(s): TSH, FREET4 in the last 168 hours.  Lab Results   Component Value Date    HGBA1C >14.0 (H) 07/12/2019       Nutritional status:   Body mass index is 24.54 kg/m².  Lab Results   Component Value Date    ALBUMIN 2.1 (L) 07/19/2019    ALBUMIN 2.1 (L) 07/18/2019    ALBUMIN 2.0 (L) 07/18/2019     No results found for: PREALBUMIN    Estimated Creatinine Clearance: 98.8 mL/min (based on SCr of 1 mg/dL).    Accu-Checks  Recent Labs     07/17/19  1230 07/17/19  1638 07/17/19  2042 07/18/19  0003 07/18/19  0409 07/18/19  0758 07/18/19  1217 07/18/19  1633 07/18/19  2207 07/19/19  0801   POCTGLUCOSE 175* 191* 186* 164* 163* 123* 150* 155* 156* 157*       Current Medications and/or Treatments Impacting Glycemic Control  Immunotherapy:    Immunosuppressants     None        Steroids:   Hormones (From admission, onward)    None        Pressors:    Autonomic Drugs (From admission, onward)    None        Hyperglycemia/Diabetes Medications:   Antihyperglycemics (From admission, onward)    Start     Stop Route Frequency Ordered    07/15/19 6472  " insulin aspart U-100 pen 0-4 Units      -- SubQ Every 4 hours PRN 07/15/19 1636    07/13/19 2045  insulin regular (Humulin R) 100 Units in sodium chloride 0.9% 100 mL infusion      -- IV Continuous 07/13/19 1937

## 2019-07-20 LAB
ALBUMIN SERPL BCP-MCNC: 2.1 G/DL (ref 3.5–5.2)
ALBUMIN SERPL BCP-MCNC: 2.1 G/DL (ref 3.5–5.2)
ALP SERPL-CCNC: 101 U/L (ref 55–135)
ALP SERPL-CCNC: 103 U/L (ref 55–135)
ALT SERPL W/O P-5'-P-CCNC: <5 U/L (ref 10–44)
ALT SERPL W/O P-5'-P-CCNC: <5 U/L (ref 10–44)
ANION GAP SERPL CALC-SCNC: 12 MMOL/L (ref 8–16)
ANION GAP SERPL CALC-SCNC: 13 MMOL/L (ref 8–16)
AST SERPL-CCNC: 12 U/L (ref 10–40)
AST SERPL-CCNC: 14 U/L (ref 10–40)
BACTERIA BLD CULT: NORMAL
BACTERIA BLD CULT: NORMAL
BASOPHILS # BLD AUTO: 0.03 K/UL (ref 0–0.2)
BASOPHILS NFR BLD: 0.4 % (ref 0–1.9)
BILIRUB SERPL-MCNC: 0.2 MG/DL (ref 0.1–1)
BILIRUB SERPL-MCNC: 0.3 MG/DL (ref 0.1–1)
BUN SERPL-MCNC: 5 MG/DL (ref 6–20)
BUN SERPL-MCNC: 6 MG/DL (ref 6–20)
CALCIUM SERPL-MCNC: 8.1 MG/DL (ref 8.7–10.5)
CALCIUM SERPL-MCNC: 8.7 MG/DL (ref 8.7–10.5)
CHLORIDE SERPL-SCNC: 94 MMOL/L (ref 95–110)
CHLORIDE SERPL-SCNC: 98 MMOL/L (ref 95–110)
CO2 SERPL-SCNC: 32 MMOL/L (ref 23–29)
CO2 SERPL-SCNC: 32 MMOL/L (ref 23–29)
CREAT SERPL-MCNC: 1 MG/DL (ref 0.5–1.4)
CREAT SERPL-MCNC: 1.3 MG/DL (ref 0.5–1.4)
DIFFERENTIAL METHOD: ABNORMAL
EOSINOPHIL # BLD AUTO: 0.1 K/UL (ref 0–0.5)
EOSINOPHIL NFR BLD: 1.2 % (ref 0–8)
ERYTHROCYTE [DISTWIDTH] IN BLOOD BY AUTOMATED COUNT: 12.8 % (ref 11.5–14.5)
EST. GFR  (AFRICAN AMERICAN): >60 ML/MIN/1.73 M^2
EST. GFR  (AFRICAN AMERICAN): >60 ML/MIN/1.73 M^2
EST. GFR  (NON AFRICAN AMERICAN): >60 ML/MIN/1.73 M^2
EST. GFR  (NON AFRICAN AMERICAN): >60 ML/MIN/1.73 M^2
GLUCOSE SERPL-MCNC: 216 MG/DL (ref 70–110)
GLUCOSE SERPL-MCNC: 440 MG/DL (ref 70–110)
HCT VFR BLD AUTO: 34.8 % (ref 40–54)
HGB BLD-MCNC: 11.1 G/DL (ref 14–18)
IMM GRANULOCYTES # BLD AUTO: 0.03 K/UL (ref 0–0.04)
IMM GRANULOCYTES NFR BLD AUTO: 0.4 % (ref 0–0.5)
LYMPHOCYTES # BLD AUTO: 1.9 K/UL (ref 1–4.8)
LYMPHOCYTES NFR BLD: 26.4 % (ref 18–48)
MAGNESIUM SERPL-MCNC: 1.7 MG/DL (ref 1.6–2.6)
MCH RBC QN AUTO: 27.7 PG (ref 27–31)
MCHC RBC AUTO-ENTMCNC: 31.9 G/DL (ref 32–36)
MCV RBC AUTO: 87 FL (ref 82–98)
MONOCYTES # BLD AUTO: 0.6 K/UL (ref 0.3–1)
MONOCYTES NFR BLD: 8.5 % (ref 4–15)
NEUTROPHILS # BLD AUTO: 4.6 K/UL (ref 1.8–7.7)
NEUTROPHILS NFR BLD: 63.1 % (ref 38–73)
NRBC BLD-RTO: 0 /100 WBC
PANC ISLET CELL IGG SER-ACNC: NORMAL
PHOSPHATE SERPL-MCNC: 2.4 MG/DL (ref 2.7–4.5)
PLATELET # BLD AUTO: 331 K/UL (ref 150–350)
PMV BLD AUTO: 10.1 FL (ref 9.2–12.9)
POCT GLUCOSE: 208 MG/DL (ref 70–110)
POCT GLUCOSE: 213 MG/DL (ref 70–110)
POCT GLUCOSE: 213 MG/DL (ref 70–110)
POCT GLUCOSE: 223 MG/DL (ref 70–110)
POCT GLUCOSE: 271 MG/DL (ref 70–110)
POTASSIUM SERPL-SCNC: 2.8 MMOL/L (ref 3.5–5.1)
POTASSIUM SERPL-SCNC: 2.9 MMOL/L (ref 3.5–5.1)
PROT SERPL-MCNC: 6.8 G/DL (ref 6–8.4)
PROT SERPL-MCNC: 7 G/DL (ref 6–8.4)
RBC # BLD AUTO: 4.01 M/UL (ref 4.6–6.2)
SODIUM SERPL-SCNC: 138 MMOL/L (ref 136–145)
SODIUM SERPL-SCNC: 143 MMOL/L (ref 136–145)
WBC # BLD AUTO: 7.21 K/UL (ref 3.9–12.7)

## 2019-07-20 PROCEDURE — 25000003 PHARM REV CODE 250: Performed by: HOSPITALIST

## 2019-07-20 PROCEDURE — 36415 COLL VENOUS BLD VENIPUNCTURE: CPT

## 2019-07-20 PROCEDURE — 99232 SBSQ HOSP IP/OBS MODERATE 35: CPT | Mod: ,,, | Performed by: INTERNAL MEDICINE

## 2019-07-20 PROCEDURE — S5571 INSULIN DISPOS PEN 3 ML: HCPCS | Performed by: GENERAL ACUTE CARE HOSPITAL

## 2019-07-20 PROCEDURE — 63600175 PHARM REV CODE 636 W HCPCS: Performed by: PHYSICIAN ASSISTANT

## 2019-07-20 PROCEDURE — 99232 PR SUBSEQUENT HOSPITAL CARE,LEVL II: ICD-10-PCS | Mod: ,,, | Performed by: INTERNAL MEDICINE

## 2019-07-20 PROCEDURE — 63600175 PHARM REV CODE 636 W HCPCS: Performed by: GENERAL ACUTE CARE HOSPITAL

## 2019-07-20 PROCEDURE — 25000003 PHARM REV CODE 250: Performed by: GENERAL ACUTE CARE HOSPITAL

## 2019-07-20 PROCEDURE — 84100 ASSAY OF PHOSPHORUS: CPT

## 2019-07-20 PROCEDURE — 25000003 PHARM REV CODE 250: Performed by: PHYSICIAN ASSISTANT

## 2019-07-20 PROCEDURE — 63600175 PHARM REV CODE 636 W HCPCS: Performed by: HOSPITALIST

## 2019-07-20 PROCEDURE — 83735 ASSAY OF MAGNESIUM: CPT

## 2019-07-20 PROCEDURE — 85025 COMPLETE CBC W/AUTO DIFF WBC: CPT

## 2019-07-20 PROCEDURE — 80053 COMPREHEN METABOLIC PANEL: CPT | Mod: 91

## 2019-07-20 PROCEDURE — 20600001 HC STEP DOWN PRIVATE ROOM

## 2019-07-20 RX ORDER — IBUPROFEN 200 MG
24 TABLET ORAL
Status: DISCONTINUED | OUTPATIENT
Start: 2019-07-20 | End: 2019-07-20

## 2019-07-20 RX ORDER — IBUPROFEN 200 MG
16 TABLET ORAL
Status: DISCONTINUED | OUTPATIENT
Start: 2019-07-20 | End: 2019-07-20

## 2019-07-20 RX ORDER — INSULIN ASPART 100 [IU]/ML
0-5 INJECTION, SOLUTION INTRAVENOUS; SUBCUTANEOUS
Status: DISCONTINUED | OUTPATIENT
Start: 2019-07-20 | End: 2019-08-02 | Stop reason: HOSPADM

## 2019-07-20 RX ORDER — GLUCAGON 1 MG
1 KIT INJECTION
Status: DISCONTINUED | OUTPATIENT
Start: 2019-07-20 | End: 2019-07-20

## 2019-07-20 RX ORDER — INSULIN ASPART 100 [IU]/ML
6 INJECTION, SOLUTION INTRAVENOUS; SUBCUTANEOUS
Status: DISCONTINUED | OUTPATIENT
Start: 2019-07-21 | End: 2019-07-21

## 2019-07-20 RX ORDER — IBUPROFEN 200 MG
16 TABLET ORAL
Status: DISCONTINUED | OUTPATIENT
Start: 2019-07-20 | End: 2019-08-02 | Stop reason: HOSPADM

## 2019-07-20 RX ORDER — INSULIN ASPART 100 [IU]/ML
6 INJECTION, SOLUTION INTRAVENOUS; SUBCUTANEOUS
Status: DISCONTINUED | OUTPATIENT
Start: 2019-07-21 | End: 2019-07-20

## 2019-07-20 RX ORDER — IBUPROFEN 200 MG
24 TABLET ORAL
Status: DISCONTINUED | OUTPATIENT
Start: 2019-07-20 | End: 2019-08-02 | Stop reason: HOSPADM

## 2019-07-20 RX ORDER — INSULIN ASPART 100 [IU]/ML
0-5 INJECTION, SOLUTION INTRAVENOUS; SUBCUTANEOUS
Status: DISCONTINUED | OUTPATIENT
Start: 2019-07-20 | End: 2019-07-20

## 2019-07-20 RX ORDER — GLUCAGON 1 MG
1 KIT INJECTION
Status: DISCONTINUED | OUTPATIENT
Start: 2019-07-20 | End: 2019-08-02 | Stop reason: HOSPADM

## 2019-07-20 RX ORDER — INSULIN ASPART 100 [IU]/ML
3 INJECTION, SOLUTION INTRAVENOUS; SUBCUTANEOUS
Status: DISCONTINUED | OUTPATIENT
Start: 2019-07-20 | End: 2019-07-20

## 2019-07-20 RX ORDER — INSULIN ASPART 100 [IU]/ML
0-4 INJECTION, SOLUTION INTRAVENOUS; SUBCUTANEOUS
Status: DISCONTINUED | OUTPATIENT
Start: 2019-07-20 | End: 2019-07-20

## 2019-07-20 RX ORDER — INSULIN ASPART 100 [IU]/ML
1-10 INJECTION, SOLUTION INTRAVENOUS; SUBCUTANEOUS
Status: DISCONTINUED | OUTPATIENT
Start: 2019-07-20 | End: 2019-07-20

## 2019-07-20 RX ADMIN — SODIUM CHLORIDE 0.4 UNITS/HR: 9 INJECTION, SOLUTION INTRAVENOUS at 12:07

## 2019-07-20 RX ADMIN — CEFAZOLIN 6 G: 1 INJECTION, POWDER, FOR SOLUTION INTRAMUSCULAR; INTRAVENOUS at 01:07

## 2019-07-20 RX ADMIN — INSULIN DETEMIR 12 UNITS: 100 INJECTION, SOLUTION SUBCUTANEOUS at 09:07

## 2019-07-20 RX ADMIN — ENOXAPARIN SODIUM 40 MG: 100 INJECTION SUBCUTANEOUS at 04:07

## 2019-07-20 RX ADMIN — ATORVASTATIN CALCIUM 80 MG: 20 TABLET, FILM COATED ORAL at 08:07

## 2019-07-20 RX ADMIN — INSULIN ASPART 2 UNITS: 100 INJECTION, SOLUTION INTRAVENOUS; SUBCUTANEOUS at 04:07

## 2019-07-20 RX ADMIN — SODIUM CHLORIDE: 0.9 INJECTION, SOLUTION INTRAVENOUS at 01:07

## 2019-07-20 RX ADMIN — AMLODIPINE BESYLATE 5 MG: 5 TABLET ORAL at 08:07

## 2019-07-20 RX ADMIN — INSULIN ASPART 1 UNITS: 100 INJECTION, SOLUTION INTRAVENOUS; SUBCUTANEOUS at 08:07

## 2019-07-20 RX ADMIN — INSULIN ASPART 3 UNITS: 100 INJECTION, SOLUTION INTRAVENOUS; SUBCUTANEOUS at 04:07

## 2019-07-20 RX ADMIN — INSULIN ASPART 3 UNITS: 100 INJECTION, SOLUTION INTRAVENOUS; SUBCUTANEOUS at 09:07

## 2019-07-20 NOTE — PLAN OF CARE
Problem: Diabetes Comorbidity  Goal: Blood Glucose Level Within Desired Range  Insulin therapy and glucose monitoring implemented with desires of therapeutic ranges.

## 2019-07-20 NOTE — ASSESSMENT & PLAN NOTE
Indio Ryder Jr. is a 49 y.o. male  S/P Debridement left foot wound (DOS: 7/14 per Dr. 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 Almonte).    - Wound vac changed today, will continue wound vac at 125 mmHg continuous pressure while inpatient. Podiatry will change wound vac   -Abx per ID, appreciate recs  - Non weight bearing left foot.  -ordered crutches, recommend PT for gait training.    DC Instructions:  Patient is to follow up with podiatry within 10 days of discharge.  Call [phone]  to make an appointment.  Home health/facility to do wound vac dressing changes every  as follows:  Rinse with saline, pat dry, apply wound vac, place vac on 125mmHg.  Dress in cast padding and ace    If patient cannot do home wound vac due to financial constraints then we will dc wound vac and apply a dry dressing.

## 2019-07-20 NOTE — SUBJECTIVE & OBJECTIVE
Subjective:     Interval History:   Patient seen at bedside s/p left foot partial 5th ray amputation with irrigation and debridement and application of graft. No acute distress. Denies any pedal pain. Denies calf pain. No new complaints. Dressings to left foot clean, dry, intact. Wound vac to left foot wound intact running @ 125 mmHg continuous pressure. Admits to nausea since ED admission, no vomiting, fever, chills.     7/20 Patient Seen at bedside for dressing change.  Wound vac intact.  Patient denies F/C/N/V.      Follow-up For: Procedure(s) (LRB):  DEBRIDEMENT, FOOT with leftt 5th ray partial amputation with Neox Graft (Left)    Post-Operative Day: 1 Day Post-Op    Scheduled Meds:   amLODIPine  5 mg Oral Daily    atorvastatin  80 mg Oral Daily    ceFAZolin(ANCEF) in D5W 500 mL CONTINUOUS INFUSION  6 g Intravenous Q24H    enoxaparin  40 mg Subcutaneous Daily    insulin aspart U-100  3 Units Subcutaneous TIDWM    insulin detemir U-100  12 Units Subcutaneous QHS     Continuous Infusions:   sodium chloride 0.9% 75 mL/hr at 07/20/19 0118    insulin (HUMAN R) infusion (adults) 0.4 Units/hr (07/20/19 1245)     PRN Meds:acetaminophen, calcium carbonate, Dextrose 10% Bolus, dextrose 50%, glucagon (human recombinant), glucose, glucose, hydrALAZINE, insulin aspart U-100, ondansetron, promethazine (PHENERGAN) IVPB, sodium chloride 0.9%, sodium chloride 0.9%    Review of Systems   Constitutional: Negative for appetite change, chills and fever.   Eyes: Negative for visual disturbance.   Respiratory: Negative for cough and shortness of breath.    Cardiovascular: Negative for chest pain and leg swelling.   Gastrointestinal: Negative for abdominal pain, constipation, diarrhea, nausea and vomiting.   Genitourinary: Negative for dysuria, frequency and hematuria.   Musculoskeletal: Negative for back pain and myalgias.   Skin: Positive for wound (left foot surgical wound; right foot plantar ulcer). Negative for color  change and rash.   Neurological: Negative for dizziness, light-headedness and headaches.   Psychiatric/Behavioral: Negative for agitation and behavioral problems. The patient is not nervous/anxious.      Objective:     Vital Signs (Most Recent):  Temp: 97.9 °F (36.6 °C) (07/20/19 0833)  Pulse: 92 (07/20/19 1120)  Resp: 18 (07/20/19 0833)  BP: (!) 155/95 (07/20/19 0833)  SpO2: 98 % (07/20/19 0833) Vital Signs (24h Range):  Temp:  [97.9 °F (36.6 °C)-98.3 °F (36.8 °C)] 97.9 °F (36.6 °C)  Pulse:  [] 92  Resp:  [17-18] 18  SpO2:  [92 %-98 %] 98 %  BP: (135-175)/() 155/95     Weight: 84.4 kg (186 lb)  Body mass index is 24.54 kg/m².    Foot Exam    General  Orientation: alert and oriented to person, place, and time       Right Foot/Ankle     Inspection and Palpation  Tenderness: none   Swelling: none   Skin Exam: ulcer;     Neurovascular  Dorsalis pedis: 1+  Posterior tibial: 1+  Saphenous nerve sensation: diminished  Tibial nerve sensation: diminished  Superficial peroneal nerve sensation: diminished  Deep peroneal nerve sensation: diminished  Sural nerve sensation: diminished      Left Foot/Ankle      Inspection and Palpation  Ecchymosis: none  Tenderness: none   Swelling: none   Skin Exam: drainage, skin changes and ulcer; (surgical wound 2/2 partial 5th ray amp with irrigation and debridement; proximal plantar sutures remain intact; serosanguinous drainage noted in wound vac cannister; no edema, no erythema, no signs of infection noted)    Neurovascular  Dorsalis pedis: 1+  Posterior tibial: 1+  Saphenous nerve sensation: diminished  Tibial nerve sensation: diminished  Superficial peroneal nerve sensation: diminished  Deep peroneal nerve sensation: diminished  Sural nerve sensation: diminished            Laboratory:  A1C:   Recent Labs   Lab 07/12/19  1023   HGBA1C >14.0*     CBC:   Recent Labs   Lab 07/20/19  0418   WBC 7.21   RBC 4.01*   HGB 11.1*   HCT 34.8*      MCV 87   MCH 27.7   MCHC 31.9*      CRP:   No results for input(s): CRP in the last 168 hours.  ESR:   No results for input(s): SEDRATE in the last 168 hours.  Wound Cultures:   Recent Labs   Lab 07/12/19  1257 07/14/19  1728 07/17/19  1214   LABAERO STREPTOCOCCUS AGALACTIAE (GROUP B)  Few  Beta-hemolytic streptococci are routinely susceptible to   penicillins,cephalosporins and carbapenems.  Susceptibility testing not routinely performed  *  STAPHYLOCOCCUS AUREUS  Few  * STREPTOCOCCUS AGALACTIAE (GROUP B)  Rare  Beta-hemolytic streptococci are routinely susceptible to   penicillins,cephalosporins and carbapenems.  Susceptibility testing not routinely performed  Skin janes also present  * Skin janes,  no predominant organism     Microbiology Results (last 7 days)     Procedure Component Value Units Date/Time    Blood culture [[id number]] Collected:  07/15/19 0305    Order Status:  Completed Specimen:  Blood from Antecubital, Right Updated:  07/20/19 0822     Blood Culture, Routine No growth after 5 days.    Blood culture [[id number]] Collected:  07/15/19 0305    Order Status:  Completed Specimen:  Blood from Antecubital, Right Updated:  07/20/19 0822     Blood Culture, Routine No growth after 5 days.    Aerobic culture [[id number]] Collected:  07/17/19 1214    Order Status:  Completed Specimen:  Wound from Foot, Left Updated:  07/19/19 1106     Aerobic Bacterial Culture Skin janes,  no predominant organism    Narrative:       Left foot wound    Culture, Anaerobe [[id number]] Collected:  07/17/19 1214    Order Status:  Completed Specimen:  Wound from Foot, Left Updated:  07/19/19 1054     Anaerobic Culture Culture in progress    Narrative:       Left foot wound    Blood culture [[id number]] Collected:  07/14/19 0629    Order Status:  Completed Specimen:  Blood Updated:  07/19/19 1012     Blood Culture, Routine No growth after 5 days.    AFB Culture & Smear [[id number]] Collected:  07/17/19 1214    Order Status:  Completed Specimen:  Wound from Foot,  Left Updated:  07/18/19 2127     AFB Culture & Smear Culture in progress     AFB CULTURE STAIN No acid fast bacilli seen.    Narrative:       Left foot wound    Culture, Anaerobe [[id number]]  (Abnormal) Collected:  07/14/19 1728    Order Status:  Completed Specimen:  Wound from Toe, Left Foot Updated:  07/18/19 1407     Anaerobic Culture PEPTONIPHILUS HAREI  Few      Narrative:       Left 5th metatarsal    Blood culture [[id number]] Collected:  07/13/19 0358    Order Status:  Completed Specimen:  Blood Updated:  07/18/19 0612     Blood Culture, Routine No growth after 5 days.    Blood culture [[id number]] Collected:  07/13/19 0358    Order Status:  Completed Specimen:  Blood Updated:  07/18/19 0612     Blood Culture, Routine No growth after 5 days.    Culture, Anaerobe [[id number]] Collected:  07/17/19 1659    Order Status:  Sent Specimen:  Tissue from Foot, Left Updated:  07/17/19 1659    Aerobic culture [[id number]] Collected:  07/17/19 1659    Order Status:  Sent Specimen:  Tissue from Foot, Left Updated:  07/17/19 1659    Fungus culture [[id number]] Collected:  07/17/19 1659    Order Status:  Sent Specimen:  Tissue from Foot, Left Updated:  07/17/19 1659    Gram stain [[id number]] Collected:  07/17/19 1659    Order Status:  Sent Specimen:  Tissue from Foot, Left Updated:  07/17/19 1659    AFB Culture & Smear [[id number]] Collected:  07/17/19 1659    Order Status:  Sent Specimen:  Tissue from Foot, Left Updated:  07/17/19 1659    Gram stain [[id number]] Collected:  07/17/19 1214    Order Status:  Completed Specimen:  Wound from Foot, Left Updated:  07/17/19 1403     Gram Stain Result No WBC's      No organisms seen    Narrative:       Left foot wound    Fungus culture [[id number]] Collected:  07/17/19 1214    Order Status:  Sent Specimen:  Wound from Foot, Left Updated:  07/17/19 1228    Culture, Anaerobe [[id number]]  (Abnormal) Collected:  07/12/19 1257    Order Status:  Completed Specimen:  Wound from Foot, Left  Updated:  07/16/19 1436     Anaerobic Culture FINEGOLDIA MAGNA  Many      Aerobic culture [[id number]]  (Abnormal) Collected:  07/14/19 1728    Order Status:  Completed Specimen:  Wound from Toe, Left Foot Updated:  07/16/19 0850     Aerobic Bacterial Culture STREPTOCOCCUS AGALACTIAE (GROUP B)  Rare  Beta-hemolytic streptococci are routinely susceptible to   penicillins,cephalosporins and carbapenems.  Susceptibility testing not routinely performed  Skin janes also present      Narrative:       Left 5th metatarsal    Aerobic culture [[id number]]  (Abnormal)  (Susceptibility) Collected:  07/12/19 1257    Order Status:  Completed Specimen:  Wound from Foot, Left Updated:  07/15/19 0953     Aerobic Bacterial Culture STREPTOCOCCUS AGALACTIAE (GROUP B)  Few  Beta-hemolytic streptococci are routinely susceptible to   penicillins,cephalosporins and carbapenems.  Susceptibility testing not routinely performed        STAPHYLOCOCCUS AUREUS  Few      Blood culture x two cultures. Draw prior to antibiotics. [[id number]]  (Abnormal) Collected:  07/12/19 1044    Order Status:  Completed Specimen:  Blood from Peripheral, Antecubital, Right Updated:  07/14/19 0947     Blood Culture, Routine Gram stain mary bottle: Gram positive cocci in chains resembling Strep      Results called to and read back by:Farrah Ayala RN 07/12/2019        22:47      Gram stain aer bottle: Gram positive cocci in chains resembling Strep       07/13/2019  06:46      STREPTOCOCCUS AGALACTIAE (GROUP B)  Beta-hemolytic streptococci are routinely susceptible to   penicillins,cephalosporins and carbapenems.  For susceptibility see order #[id number]      Narrative:       Aerobic and anaerobic    Blood culture x two cultures. Draw prior to antibiotics. [[id number]]  (Abnormal)  (Susceptibility) Collected:  07/12/19 1044    Order Status:  Completed Specimen:  Blood from Peripheral, Antecubital, Left Updated:  07/14/19 0947     Blood Culture, Routine Gram stain mary  bottle: Gram positive cocci in chains resembling Strep      Results called to and read back by:Farrah Ayala RN 07/12/2019        22:46      Gram stain aer bottle: Gram positive cocci in chains resembling Strep       07/13/2019  06:43      STREPTOCOCCUS AGALACTIAE (GROUP B)  Beta-hemolytic streptococci are routinely susceptible to   penicillins,cephalosporins and carbapenems.      Narrative:       Aerobic and anaerobic        Specimen (12h ago, onward)    None          Diagnostic Results:  I have reviewed all pertinent imaging results/findings within the past 24 hours.    Clinical Findings:  Left foot with wound vac application intact; Running at 125 mmHg continuous pressure    Ulcer Location: left lateral plantar foot  Measurements:  Periwound: viable/slight maceration  Drainage: serosanguinous.  Base:  100% granular. 0% fibrin., wound graft intact  Signs of infection: None.       7/20            7/18      7/16      7/15 s/p I&D      7/12 post ED bedside debridement      7/12 pre ED bedside debridement

## 2019-07-20 NOTE — PLAN OF CARE
Problem: Adult Inpatient Plan of Care  Goal: Plan of Care Review  Outcome: Ongoing (interventions implemented as appropriate)     07/20/19 1316   Plan of Care Review   Plan of Care Reviewed With patient;family   Progress no change   POC reviewed with Pt and family. No acute events overnight. AAOx4. Denied pain or discomfort.  Wound vac in place, dressing c/d/i. Insulin gtt infusing at 0.4u/hr, covered per sliding scale. IVF infusing at 75ml/hr. Ancef infusing continuously at 20.8ml/hr. Vitals stable. Safety maintained. Will continue to monitor.

## 2019-07-20 NOTE — SUBJECTIVE & OBJECTIVE
"Interval HPI:  Glucose levels ranging between 150-208 for the past 24hrs.  Pt is now tolerating 50% of PO diet     Overnight events: No overnight events     Eatin%  Nausea: No  Hypoglycemia and intervention: No  Fever: No  TPN and/or TF: No    BP (!) 155/95 (BP Location: Left arm, Patient Position: Lying)   Pulse 86   Temp 97.9 °F (36.6 °C) (Oral)   Resp 18   Ht 6' 1" (1.854 m)   Wt 84.4 kg (186 lb)   SpO2 98%   BMI 24.54 kg/m²     Labs Reviewed and Include    Recent Labs   Lab 19   *   CALCIUM 8.6*   ALBUMIN 2.1*   PROT 7.0      K 3.1*   CO2 33*   CL 98   BUN 6   CREATININE 1.1   ALKPHOS 103   ALT <5*   AST 11   BILITOT 0.2     Lab Results   Component Value Date    WBC 7.21 2019    HGB 11.1 (L) 2019    HCT 34.8 (L) 2019    MCV 87 2019     2019     No results for input(s): TSH, FREET4 in the last 168 hours.  Lab Results   Component Value Date    HGBA1C >14.0 (H) 2019       Nutritional status:   Body mass index is 24.54 kg/m².  Lab Results   Component Value Date    ALBUMIN 2.1 (L) 2019    ALBUMIN 2.1 (L) 2019    ALBUMIN 2.1 (L) 2019     No results found for: PREALBUMIN    Estimated Creatinine Clearance: 89.8 mL/min (based on SCr of 1.1 mg/dL).    Accu-Checks  Recent Labs     19  0003 19  0409 19  0758 19  1217 19  1633 19  2207 19  0801 19  1109 19  1522 19  2204   POCTGLUCOSE 164* 163* 123* 150* 155* 156* 157* 173* 154* 208*       Current Medications and/or Treatments Impacting Glycemic Control  Immunotherapy:    Immunosuppressants     None        Steroids:   Hormones (From admission, onward)    None        Pressors:    Autonomic Drugs (From admission, onward)    None        Hyperglycemia/Diabetes Medications:   Antihyperglycemics (From admission, onward)    Start     Stop Route Frequency Ordered    07/15/19 1191  insulin aspart U-100 pen 0-4 Units      -- " SubQ Every 4 hours PRN 07/15/19 1636    07/13/19 2045  insulin regular (Humulin R) 100 Units in sodium chloride 0.9% 100 mL infusion      -- IV Continuous 07/13/19 1937

## 2019-07-20 NOTE — ASSESSMENT & PLAN NOTE
-Pt has poorly controlled type 2 DM w/ A1C > 14 due to non-compliance (had insurance issues)  - DKA resolved   -Glucose levels for the past 24hrs ranging between (150-208)   -Glucose level goal inpatient (140 -180)   -Pt currently @goal on Insulin ggt 0.4unit/hr   -PT now tolerating 50% of PO Diet     Plan:   -Pt now tolerating 50% of PO diet   -Will switch to MDI today   -Start Detemir 10 units qhs   -Pls DC Insulin ggt currently @  0.4units/hr (2hours after administering Detemir at bedtime)   -C/w low dose correction insulin qAC and HS   -Accuchecks qAC and HS     Discharge Recommendations: TBD

## 2019-07-20 NOTE — PROGRESS NOTES
[facility]  Podiatry  Progress Note    Patient Name: Indio Ryder Jr.  MRN: [id number]  Admission Date: 7/12/2019  Hospital Length of Stay: 8 days  Attending Physician: Luis Rust MD  Primary Care Provider: Lorena Thakur MD     Subjective:     Interval History:   Patient seen at bedside s/p left foot partial 5th ray amputation with irrigation and debridement and application of graft. No acute distress. Denies any pedal pain. Denies calf pain. No new complaints. Dressings to left foot clean, dry, intact. Wound vac to left foot wound intact running @ 125 mmHg continuous pressure. Admits to nausea since ED admission, no vomiting, fever, chills.     7/20 Patient Seen at bedside for dressing change.  Wound vac intact.  Patient denies F/C/N/V.      Follow-up For: Procedure(s) (LRB):  DEBRIDEMENT, FOOT with leftt 5th ray partial amputation with Neox Graft (Left)    Post-Operative Day: 1 Day Post-Op    Scheduled Meds:   amLODIPine  5 mg Oral Daily    atorvastatin  80 mg Oral Daily    ceFAZolin(ANCEF) in D5W 500 mL CONTINUOUS INFUSION  6 g Intravenous Q24H    enoxaparin  40 mg Subcutaneous Daily    insulin aspart U-100  3 Units Subcutaneous TIDWM    insulin detemir U-100  12 Units Subcutaneous QHS     Continuous Infusions:   sodium chloride 0.9% 75 mL/hr at 07/20/19 0118    insulin (HUMAN R) infusion (adults) 0.4 Units/hr (07/20/19 1245)     PRN Meds:acetaminophen, calcium carbonate, Dextrose 10% Bolus, dextrose 50%, glucagon (human recombinant), glucose, glucose, hydrALAZINE, insulin aspart U-100, ondansetron, promethazine (PHENERGAN) IVPB, sodium chloride 0.9%, sodium chloride 0.9%    Review of Systems   Constitutional: Negative for appetite change, chills and fever.   Eyes: Negative for visual disturbance.   Respiratory: Negative for cough and shortness of breath.    Cardiovascular: Negative for chest pain and leg swelling.   Gastrointestinal: Negative for abdominal pain,  constipation, diarrhea, nausea and vomiting.   Genitourinary: Negative for dysuria, frequency and hematuria.   Musculoskeletal: Negative for back pain and myalgias.   Skin: Positive for wound (left foot surgical wound; right foot plantar ulcer). Negative for color change and rash.   Neurological: Negative for dizziness, light-headedness and headaches.   Psychiatric/Behavioral: Negative for agitation and behavioral problems. The patient is not nervous/anxious.      Objective:     Vital Signs (Most Recent):  Temp: 97.9 °F (36.6 °C) (07/20/19 0833)  Pulse: 92 (07/20/19 1120)  Resp: 18 (07/20/19 0833)  BP: (!) 155/95 (07/20/19 0833)  SpO2: 98 % (07/20/19 0833) Vital Signs (24h Range):  Temp:  [97.9 °F (36.6 °C)-98.3 °F (36.8 °C)] 97.9 °F (36.6 °C)  Pulse:  [] 92  Resp:  [17-18] 18  SpO2:  [92 %-98 %] 98 %  BP: (135-175)/() 155/95     Weight: 84.4 kg (186 lb)  Body mass index is 24.54 kg/m².    Foot Exam    General  Orientation: alert and oriented to person, place, and time       Right Foot/Ankle     Inspection and Palpation  Tenderness: none   Swelling: none   Skin Exam: ulcer;     Neurovascular  Dorsalis pedis: 1+  Posterior tibial: 1+  Saphenous nerve sensation: diminished  Tibial nerve sensation: diminished  Superficial peroneal nerve sensation: diminished  Deep peroneal nerve sensation: diminished  Sural nerve sensation: diminished      Left Foot/Ankle      Inspection and Palpation  Ecchymosis: none  Tenderness: none   Swelling: none   Skin Exam: drainage, skin changes and ulcer; (surgical wound 2/2 partial 5th ray amp with irrigation and debridement; proximal plantar sutures remain intact; serosanguinous drainage noted in wound vac cannister; no edema, no erythema, no signs of infection noted)    Neurovascular  Dorsalis pedis: 1+  Posterior tibial: 1+  Saphenous nerve sensation: diminished  Tibial nerve sensation: diminished  Superficial peroneal nerve sensation: diminished  Deep peroneal nerve  sensation: diminished  Sural nerve sensation: diminished            Laboratory:  A1C:   Recent Labs   Lab 07/12/19  1023   HGBA1C >14.0*     CBC:   Recent Labs   Lab 07/20/19  0418   WBC 7.21   RBC 4.01*   HGB 11.1*   HCT 34.8*      MCV 87   MCH 27.7   MCHC 31.9*     CRP:   No results for input(s): CRP in the last 168 hours.  ESR:   No results for input(s): SEDRATE in the last 168 hours.  Wound Cultures:   Recent Labs   Lab 07/12/19  1257 07/14/19  1728 07/17/19  1214   LABAERO STREPTOCOCCUS AGALACTIAE (GROUP B)  Few  Beta-hemolytic streptococci are routinely susceptible to   penicillins,cephalosporins and carbapenems.  Susceptibility testing not routinely performed  *  STAPHYLOCOCCUS AUREUS  Few  * STREPTOCOCCUS AGALACTIAE (GROUP B)  Rare  Beta-hemolytic streptococci are routinely susceptible to   penicillins,cephalosporins and carbapenems.  Susceptibility testing not routinely performed  Skin janes also present  * Skin janes,  no predominant organism     Microbiology Results (last 7 days)     Procedure Component Value Units Date/Time    Blood culture [[id number]] Collected:  07/15/19 0305    Order Status:  Completed Specimen:  Blood from Antecubital, Right Updated:  07/20/19 0822     Blood Culture, Routine No growth after 5 days.    Blood culture [[id number]] Collected:  07/15/19 0305    Order Status:  Completed Specimen:  Blood from Antecubital, Right Updated:  07/20/19 0822     Blood Culture, Routine No growth after 5 days.    Aerobic culture [[id number]] Collected:  07/17/19 1214    Order Status:  Completed Specimen:  Wound from Foot, Left Updated:  07/19/19 1106     Aerobic Bacterial Culture Skin janes,  no predominant organism    Narrative:       Left foot wound    Culture, Anaerobe [[id number]] Collected:  07/17/19 1214    Order Status:  Completed Specimen:  Wound from Foot, Left Updated:  07/19/19 1054     Anaerobic Culture Culture in progress    Narrative:       Left foot wound    Blood culture  [[id number]] Collected:  07/14/19 0629    Order Status:  Completed Specimen:  Blood Updated:  07/19/19 1012     Blood Culture, Routine No growth after 5 days.    AFB Culture & Smear [[id number]] Collected:  07/17/19 1214    Order Status:  Completed Specimen:  Wound from Foot, Left Updated:  07/18/19 2127     AFB Culture & Smear Culture in progress     AFB CULTURE STAIN No acid fast bacilli seen.    Narrative:       Left foot wound    Culture, Anaerobe [[id number]]  (Abnormal) Collected:  07/14/19 1728    Order Status:  Completed Specimen:  Wound from Toe, Left Foot Updated:  07/18/19 1407     Anaerobic Culture PEPTONIPHILUS HAREI  Few      Narrative:       Left 5th metatarsal    Blood culture [[id number]] Collected:  07/13/19 0358    Order Status:  Completed Specimen:  Blood Updated:  07/18/19 0612     Blood Culture, Routine No growth after 5 days.    Blood culture [[id number]] Collected:  07/13/19 0358    Order Status:  Completed Specimen:  Blood Updated:  07/18/19 0612     Blood Culture, Routine No growth after 5 days.    Culture, Anaerobe [[id number]] Collected:  07/17/19 1659    Order Status:  Sent Specimen:  Tissue from Foot, Left Updated:  07/17/19 1659    Aerobic culture [[id number]] Collected:  07/17/19 1659    Order Status:  Sent Specimen:  Tissue from Foot, Left Updated:  07/17/19 1659    Fungus culture [[id number]] Collected:  07/17/19 1659    Order Status:  Sent Specimen:  Tissue from Foot, Left Updated:  07/17/19 1659    Gram stain [[id number]] Collected:  07/17/19 1659    Order Status:  Sent Specimen:  Tissue from Foot, Left Updated:  07/17/19 1659    AFB Culture & Smear [[id number]] Collected:  07/17/19 1659    Order Status:  Sent Specimen:  Tissue from Foot, Left Updated:  07/17/19 1659    Gram stain [[id number]] Collected:  07/17/19 1214    Order Status:  Completed Specimen:  Wound from Foot, Left Updated:  07/17/19 1403     Gram Stain Result No WBC's      No organisms seen    Narrative:       Left foot wound     Fungus culture [[id number]] Collected:  07/17/19 1214    Order Status:  Sent Specimen:  Wound from Foot, Left Updated:  07/17/19 1228    Culture, Anaerobe [[id number]]  (Abnormal) Collected:  07/12/19 1257    Order Status:  Completed Specimen:  Wound from Foot, Left Updated:  07/16/19 1436     Anaerobic Culture FINEGOLDIA MAGNA  Many      Aerobic culture [[id number]]  (Abnormal) Collected:  07/14/19 1728    Order Status:  Completed Specimen:  Wound from Toe, Left Foot Updated:  07/16/19 0850     Aerobic Bacterial Culture STREPTOCOCCUS AGALACTIAE (GROUP B)  Rare  Beta-hemolytic streptococci are routinely susceptible to   penicillins,cephalosporins and carbapenems.  Susceptibility testing not routinely performed  Skin janes also present      Narrative:       Left 5th metatarsal    Aerobic culture [[id number]]  (Abnormal)  (Susceptibility) Collected:  07/12/19 1257    Order Status:  Completed Specimen:  Wound from Foot, Left Updated:  07/15/19 0953     Aerobic Bacterial Culture STREPTOCOCCUS AGALACTIAE (GROUP B)  Few  Beta-hemolytic streptococci are routinely susceptible to   penicillins,cephalosporins and carbapenems.  Susceptibility testing not routinely performed        STAPHYLOCOCCUS AUREUS  Few      Blood culture x two cultures. Draw prior to antibiotics. [[id number]]  (Abnormal) Collected:  07/12/19 1044    Order Status:  Completed Specimen:  Blood from Peripheral, Antecubital, Right Updated:  07/14/19 0947     Blood Culture, Routine Gram stain mary bottle: Gram positive cocci in chains resembling Strep      Results called to and read back by:Farrah Ayala RN 07/12/2019        22:47      Gram stain aer bottle: Gram positive cocci in chains resembling Strep       07/13/2019  06:46      STREPTOCOCCUS AGALACTIAE (GROUP B)  Beta-hemolytic streptococci are routinely susceptible to   penicillins,cephalosporins and carbapenems.  For susceptibility see order #[id number]      Narrative:       Aerobic and anaerobic     Blood culture x two cultures. Draw prior to antibiotics. [[id number]]  (Abnormal)  (Susceptibility) Collected:  07/12/19 1044    Order Status:  Completed Specimen:  Blood from Peripheral, Antecubital, Left Updated:  07/14/19 0947     Blood Culture, Routine Gram stain mary bottle: Gram positive cocci in chains resembling Strep      Results called to and read back by:Farrah Ayala RN 07/12/2019        22:46      Gram stain aer bottle: Gram positive cocci in chains resembling Strep       07/13/2019  06:43      STREPTOCOCCUS AGALACTIAE (GROUP B)  Beta-hemolytic streptococci are routinely susceptible to   penicillins,cephalosporins and carbapenems.      Narrative:       Aerobic and anaerobic        Specimen (12h ago, onward)    None          Diagnostic Results:  I have reviewed all pertinent imaging results/findings within the past 24 hours.    Clinical Findings:  Left foot with wound vac application intact; Running at 125 mmHg continuous pressure    Ulcer Location: left lateral plantar foot  Measurements:  Periwound: viable/slight maceration  Drainage: serosanguinous.  Base:  100% granular. 0% fibrin., wound graft intact  Signs of infection: None.       7/20            7/18      7/16      7/15 s/p I&D      7/12 post ED bedside debridement      7/12 pre ED bedside debridement          Assessment/Plan:     Anemia  Managed per hospital medicine    Leukocytosis  resolved    Acute osteomyelitis  Indio Ryder  is a 49 y.o. male  S/P Debridement left foot wound (DOS: 7/14 per Dr. 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 Almonte).    - Wound vac changed today, will continue wound vac at 125 mmHg continuous pressure while inpatient. Podiatry will change wound vac   -Abx per ID, appreciate recs  - Non weight bearing left foot.  -ordered crutches, recommend PT for gait training.    DC Instructions:  Patient is to follow up  with podiatry within 10 days of discharge.  Call [phone]  to make an appointment.  Home health/facility to do wound vac dressing changes every  as follows:  Rinse with saline, pat dry, apply wound vac, place vac on 125mmHg.  Dress in cast padding and ace    If patient cannot do home wound vac due to financial constraints then we will dc wound vac and apply a dry dressing.            Sepsis  resolved    Type 2 diabetes mellitus with left eye affected by mild nonproliferative retinopathy without macular edema, without long-term current use of insulin  Managed per hospital medicine    Type 2 diabetes mellitus with hyperglycemia, with long-term current use of insulin  Managed by hospital medicine/endo team    Mixed hyperlipidemia  Managed per hospital medicine    Essential hypertension  Per primary        Martin Parker MD  Podiatry  [facility]

## 2019-07-20 NOTE — PROGRESS NOTES
"[facility]  Endocrinology  Progress Note    Admit Date: 2019     Reason for Consult: Management of T2DM, Hyperglycemia, DKA    Surgical Procedure and Date: 19    Diabetes diagnosis year: >20 years, 1990s?    Home Diabetes Medications:  Metformin 500mg BID, Lantus 26U QHS    How often checking glucose at home? None  BG readings on regimen: n/a  Hypoglycemia on the regimen?  No  Missed doses on regimen?  No    Diabetes Complications include:     Hyperglycemia and Foot ulcer      Complicating diabetes co morbidities:  HTN      HPI:   Patient is a 49 y.o. male with a diagnosis of Type 2 DM (noncomplaince, skip Levemir 1-2x a week), HTN, PVD, hx of right foot osteomyelitis who was admitted to hospital medicine for DKA and L foot ulcer. He report  4-5 days of nausea and vomiting. Vomiting is non-bloody. He has not been able to keep anything down. Pt hasn't been administering his insulin during this time due to the fact that he has not been able to keep anything down. He was found to have a large open wound on L foot, pt recently lost his insurance and has been unable to follow up with wound care. Xray showed new Osteomyelitis of L foot.  Pt report skipping his Lantus at home 1-2x a week, not on insulin with meal. Was on Trulicity but stopped due to lost of insurance. BHOB was 4.1, A1C >14, Anion gap of 21, glucose 349. Endocrine was consulted for "DKA", diabetes management.         Interval HPI:  Glucose levels ranging between 150-208 for the past 24hrs.  Pt is now tolerating 50% of PO diet     Overnight events: No overnight events     Eatin%  Nausea: No  Hypoglycemia and intervention: No  Fever: No  TPN and/or TF: No    BP (!) 155/95 (BP Location: Left arm, Patient Position: Lying)   Pulse 86   Temp 97.9 °F (36.6 °C) (Oral)   Resp 18   Ht 6' 1" (1.854 m)   Wt 84.4 kg (186 lb)   SpO2 98%   BMI 24.54 kg/m²      Labs Reviewed and Include    Recent Labs   Lab 19   GLU " 211*   CALCIUM 8.6*   ALBUMIN 2.1*   PROT 7.0      K 3.1*   CO2 33*   CL 98   BUN 6   CREATININE 1.1   ALKPHOS 103   ALT <5*   AST 11   BILITOT 0.2     Lab Results   Component Value Date    WBC 7.21 07/20/2019    HGB 11.1 (L) 07/20/2019    HCT 34.8 (L) 07/20/2019    MCV 87 07/20/2019     07/20/2019     No results for input(s): TSH, FREET4 in the last 168 hours.  Lab Results   Component Value Date    HGBA1C >14.0 (H) 07/12/2019       Nutritional status:   Body mass index is 24.54 kg/m².  Lab Results   Component Value Date    ALBUMIN 2.1 (L) 07/19/2019    ALBUMIN 2.1 (L) 07/19/2019    ALBUMIN 2.1 (L) 07/18/2019     No results found for: PREALBUMIN    Estimated Creatinine Clearance: 89.8 mL/min (based on SCr of 1.1 mg/dL).    Accu-Checks  Recent Labs     07/18/19  0003 07/18/19  0409 07/18/19  0758 07/18/19  1217 07/18/19  1633 07/18/19  2207 07/19/19  0801 07/19/19  1109 07/19/19  1522 07/19/19  2204   POCTGLUCOSE 164* 163* 123* 150* 155* 156* 157* 173* 154* 208*       Current Medications and/or Treatments Impacting Glycemic Control  Immunotherapy:    Immunosuppressants     None        Steroids:   Hormones (From admission, onward)    None        Pressors:    Autonomic Drugs (From admission, onward)    None        Hyperglycemia/Diabetes Medications:   Antihyperglycemics (From admission, onward)    Start     Stop Route Frequency Ordered    07/15/19 1735  insulin aspart U-100 pen 0-4 Units      -- SubQ Every 4 hours PRN 07/15/19 1636    07/13/19 2045  insulin regular (Humulin R) 100 Units in sodium chloride 0.9% 100 mL infusion      -- IV Continuous 07/13/19 1937          ASSESSMENT and PLAN    Type 2 diabetes mellitus with hyperglycemia, with long-term current use of insulin  -Pt has poorly controlled type 2 DM w/ A1C > 14 due to non-compliance (had insurance issues)  - DKA resolved   -Glucose levels for the past 24hrs ranging between (150-208)   -Glucose level goal inpatient (140 -180)   -Pt currently  @goal on Insulin ggt 0.4unit/hr   -PT now tolerating 50% of PO Diet     Plan:   -Pt now tolerating 50% of PO diet   -Will switch to MDI today   -Start Detemir 10 units qhs   -Pls DC Insulin ggt currently @  0.4units/hr (2hours after administering Detemir at bedtime)   -C/w low dose correction insulin qAC and HS   -Accuchecks qAC and HS     Discharge Recommendations: TBD    Acute osteomyelitis  - New L foot OM  - management per primary and Podiatry  - On IV Abx  -Tight glucose control (Goal 140-180)       Diabetic ketoacidosis without coma associated with type 2 diabetes mellitus  - DKA on admission due to noncompliance of insulin use combine with Osteomyelitis of foot  - Now resolved  -Refer to plan above         Nakul Patel MD  Endocrinology  [facility]

## 2019-07-20 NOTE — PLAN OF CARE
Problem: Adult Inpatient Plan of Care  Goal: Plan of Care Review  POC reviewed with patient with md orders implemented throughout the shift. Patient iv sites intact without any redness or swelling. Wound vac patent and intact with dressing to left foot dry/intact. Patient does not voice any c/o at this time without any distress/discomfort noted. Continued glucose monitoring as ordered and education. Q2 hr rounds implemented, safety maintained, call light in reach, siderails up x 2 with ongoing monitoring.

## 2019-07-20 NOTE — PROGRESS NOTES
Progress Note  Hospital Medicine    Admit Date: 7/12/2019  Length of Stay:  LOS: 8 days     SUBJECTIVE:         Follow-up For:  Bacteremia due to group B Streptococcus    HPI/Interval history (See H&P for complete P,F,SHx) :   Over view  Indio Ryder Jr. is a 49 y.o. male with a PMH of T2DM (poorly  controlled, with long term insulin use) and HTN who presented to the ED on 7/12/2019 diagnosed with  Vomiting (vomiting for past 4 days, denies sick contacts)   Oriented x3 accompanied by daughter at the bedside.  Mentions that he wears lower extremity stockings for edema. Reportedly formed a new ulcer on the left lateral aspect of the foot about 10 days back.  had 4-5 days of nausea and vomiting. Vomiting is non-bloody, No abdominal pain..  It occurs immediately after eating and he has not been able to keep anything down. Associated with fevers and chills but no objective temperature measurement.  At baseline only takes insulin once a day stop taking more than a week because of nausea and vomiting and poor oral intake.  Does not check fingersticks regularly at home. recently lost his insurance and has been unable to follow up with wound care.  His last follow-up with primary care provider and podiatrist was about 2 years.  He has been managing his right foot ulcer at home with dressing but over the past 2 weeks an ulcer on his left foot has developed. He denies any pain or injury- has chronic numbness of bilateral lower extremities and hands from diabetic neuropathy.  Found to be in DKA in the emergency department with beta hydroxybutyrate elevated at 4.1.  Started on insulin drip    07/13/2019   Temperature of 101.5 last night.  Anion gap resolved- bicarbonate 23, insulin drip discontinued.  Started on Levemir 23 units q.a.m. 7 units as part t.i.d. with meals with low-dose sliding scale.  Diabetic diet and NPO from midnight for possible intervention.  MRI of the foot- Ulceration involving the lateral plantar  aspect of the distal left foot, with findings concerning for exposed bone involving the distal aspect of the 5th metatarsal and proximal phalange of the 5th toe.  There are associated changes concerning for osteomyelitis involving the distal aspect of the 5th metatarsal and entirety of the right toe.  There is osseous hyperemia involving the proximal phalange of the 2nd toe, early changes of osteomyelitis are a consideration . aerobic culture  and blood culture with Group B strep. discontinued vancomycin       07/14/2019  Had urinary retention of 800 mL but spontaneously voided.  Started on transitional insulin drip as NPO from midnight for OR today.  Hypokalemia repeat.  Blood cultures daily until clear.  Aerobic cultures with group B Streptococcus and Staph aureus.  Added vancomycin ( monitor for toxicity)    Interval history   s/p I&D and 5th ray amputation in the OR  on  07/14/2019.  ALISSA ordered to evaluate vascular status intraoperative Cultures pending . clean margin cultures and pathology pending. Deescalated from vanc and zosyn to cefazolin 6 gram IV cont infusion.  blood cultures from 07/13/2019 no growth.  Continues to have nausea vomiting since unable to tolerate anything by mouth.  Started on IV hydration and Reglan    Review of Systems: List if applicable  Constitutional: Positive for activity change, appetite change (Poor appetite for the last and), chills, fatigue, fever ( weight loss) and unexpected weight change.   HENT: Negative for congestion, ear discharge, ear pain, facial swelling and sore throat.    Eyes: Negative for pain, discharge and itching.   Respiratory: Positive for shortness of breath ( at rest and exertion ). Negative for cough, chest tightness and wheezing.    Cardiovascular: Positive for leg swelling ( chronic). Negative for chest pain and palpitations.   Gastrointestinal: Positive for vomiting. Negative for abdominal distention, abdominal pain, blood in stool, constipation and  diarrhea.   Endocrine: Negative for cold intolerance.   Genitourinary: Negative for dysuria, hematuria and urgency.   Musculoskeletal: Negative for arthralgias, gait problem, myalgias, neck pain and neck stiffness.   Skin: Negative for color change, pallor and rash.   Allergic/Immunologic: Negative for environmental allergies.   Neurological: Positive for weakness and numbness ( bilateral lower extremities and hands attributes to diabetic neuropathy). Negative for dizziness, syncope, light-headedness and headaches.   Hematological: Negative for adenopathy.   Psychiatric/Behavioral: Negative for agitation, behavioral problems and confusion.     OBJECTIVE:     Vital Signs Range (Last 24H):  Temp:  [97.9 °F (36.6 °C)-98.3 °F (36.8 °C)]   Pulse:  []   Resp:  [17-18]   BP: (135-175)/()   SpO2:  [92 %-98 %]     Physical Exam:  Constitutional: He is oriented to person, place, and time. He appears well-developed and well-nourished.   HENT:   Head: Normocephalic and atraumatic.   Mouth/Throat: Oropharynx is clear and moist. No oropharyngeal exudate.   Eyes: Pupils are equal, round, and reactive to light. Conjunctivae and EOM are normal. Right eye exhibits no discharge. Left eye exhibits no discharge. No scleral icterus.   Neck: Normal range of motion. Neck supple. No JVD present. No tracheal deviation present. No thyromegaly present.   Cardiovascular: Normal rate, regular rhythm and normal heart sounds. Exam reveals no gallop and no friction rub.   No murmur heard.  Pulmonary/Chest: Effort normal and breath sounds normal. No stridor. No respiratory distress. He has no wheezes. He has no rales.   Abdominal: Soft. Bowel sounds are normal. He exhibits no distension and no mass. There is no tenderness. There is no guarding.   Musculoskeletal: Normal range of motion. He exhibits edema.   Lymphadenopathy:     He has no cervical adenopathy.   Neurological: He is oriented to person, place, and time. No cranial nerve  deficit.   Able to move upper and lower extremities without limitation   Skin: Skin is warm and dry.   Left foot: 6cm ulcer with exposed subcutaneous fat and eschar with foul smelling, purulent drainage overlying lateral aspect of left 5th MTP.     Right foot: 1.5cm clean based ulcer at base of right 5th MTP joint. Right great toe and second toe surgically amputated.     Psychiatric: He has a normal mood and affect. His behavior is normal.   Nursing note and vitals reviewed    Medications:  Medication list was reviewed and changes noted under Assessment/Plan.      Current Facility-Administered Medications:     0.9%  NaCl infusion, , Intravenous, Continuous, Vicente Wick MD, Last Rate: 75 mL/hr at 07/20/19 0118    acetaminophen tablet 650 mg, 650 mg, Oral, Q6H PRN, Vicente Wick MD, 650 mg at 07/17/19 0458    amLODIPine tablet 5 mg, 5 mg, Oral, Daily, Vicente Wick MD, 5 mg at 07/20/19 0826    atorvastatin tablet 80 mg, 80 mg, Oral, Daily, Vicente Wick MD, 80 mg at 07/20/19 0826    calcium carbonate 200 mg calcium (500 mg) chewable tablet 1,000 mg, 1,000 mg, Oral, BID PRN, Antonio Tate PA-C, 1,000 mg at 07/14/19 1850    ceFAZolin (ANCEF) 6 g in dextrose 5 % 500 mL CONTINUOUS INFUSION, 6 g, Intravenous, Q24H, RAMILA Kinsey, 6 g at 07/19/19 1309    dextrose 10% (D10W) Bolus, 12.5 g, Intravenous, PRN, Nakul Patel MD    dextrose 10% (D10W) Bolus, 25 g, Intravenous, PRN, Nakul Patel MD    enoxaparin injection 40 mg, 40 mg, Subcutaneous, Daily, Vicente Wick MD, 40 mg at 07/19/19 1710    glucagon (human recombinant) injection 1 mg, 1 mg, Intramuscular, PRN, Nakul Patel MD    glucose chewable tablet 16 g, 16 g, Oral, PRN, Nakul Patel MD    glucose chewable tablet 24 g, 24 g, Oral, PRN, Nakul Patel MD    hydrALAZINE tablet 25 mg, 25 mg, Oral, Q8H PRN, Vicente Wick MD, Stopped at 07/19/19 2112    insulin aspart U-100 pen 0-4 Units, 0-4 Units,  Subcutaneous, PRN, Max Betzaida Patel MD    insulin regular (Humulin R) 100 Units in sodium chloride 0.9% 100 mL infusion, 0.4 Units/hr, Intravenous, Continuous, Max Betzaida Patel MD    ondansetron injection 8 mg, 8 mg, Intravenous, Q8H PRN, Zeynep Duke PA-C    promethazine (PHENERGAN) 12.5 mg in dextrose 5 % 50 mL IVPB, 12.5 mg, Intravenous, Q6H PRN, Zeynep Duke PA-C, Last Rate: 150 mL/hr at 07/18/19 0012, 12.5 mg at 07/18/19 0012    sodium chloride 0.9% flush 10 mL, 10 mL, Intravenous, PRN, Vicente Wick MD    sodium chloride 0.9% flush 10 mL, 10 mL, Intravenous, PRN, Mariela Damon MD    acetaminophen, calcium carbonate, Dextrose 10% Bolus, Dextrose 10% Bolus, glucagon (human recombinant), glucose, glucose, hydrALAZINE, insulin aspart U-100, ondansetron, promethazine (PHENERGAN) IVPB, sodium chloride 0.9%, sodium chloride 0.9%    Laboratory/Diagnostic Data:  Reviewed and noted in plan where applicable- Please see chart for full lab data.    Recent Labs   Lab 07/18/19 0416 07/19/19 0337 07/20/19 0418   WBC 8.12 7.74 7.21   HGB 10.1* 11.3* 11.1*   HCT 33.1* 35.8* 34.8*    350 331       Recent Labs   Lab 07/18/19 0416 07/19/19 0337 07/19/19  0844 07/19/19 2009 07/20/19 0418 07/20/19  0921   NA  --    < >  --  145 142  --  143   K  --    < >  --  2.9* 3.1*  --  2.9*   CL  --    < >  --  101 98  --  98   CO2  --    < >  --  33* 33*  --  32*   BUN  --    < >  --  6 6  --  6   CREATININE  --    < >  --  1.0 1.1  --  1.0   GLU  --    < >  --  153* 211*  --  216*   CALCIUM  --    < >  --  9.0 8.6*  --  8.7   MG 1.7  --  1.7  --   --  1.7  --    PHOS 2.8  --  2.4*  --   --  2.4*  --     < > = values in this interval not displayed.       Recent Labs   Lab 07/19/19  0844 07/19/19 2009 07/20/19  0921   ALKPHOS 107 103 103   ALT <5* <5* <5*   AST 12 11 14   ALBUMIN 2.1* 2.1* 2.1*   PROT 7.2 7.0 7.0   BILITOT 0.2 0.2 0.3        Microbiology labs for the last week  Microbiology Results  (last 7 days)     Procedure Component Value Units Date/Time    Blood culture [[id number]] Collected:  07/15/19 0305    Order Status:  Completed Specimen:  Blood from Antecubital, Right Updated:  07/20/19 0822     Blood Culture, Routine No growth after 5 days.    Blood culture [[id number]] Collected:  07/15/19 0305    Order Status:  Completed Specimen:  Blood from Antecubital, Right Updated:  07/20/19 0822     Blood Culture, Routine No growth after 5 days.    Aerobic culture [[id number]] Collected:  07/17/19 1214    Order Status:  Completed Specimen:  Wound from Foot, Left Updated:  07/19/19 1106     Aerobic Bacterial Culture Skin janes,  no predominant organism    Narrative:       Left foot wound    Culture, Anaerobe [[id number]] Collected:  07/17/19 1214    Order Status:  Completed Specimen:  Wound from Foot, Left Updated:  07/19/19 1054     Anaerobic Culture Culture in progress    Narrative:       Left foot wound    Blood culture [[id number]] Collected:  07/14/19 0629    Order Status:  Completed Specimen:  Blood Updated:  07/19/19 1012     Blood Culture, Routine No growth after 5 days.    AFB Culture & Smear [[id number]] Collected:  07/17/19 1214    Order Status:  Completed Specimen:  Wound from Foot, Left Updated:  07/18/19 2127     AFB Culture & Smear Culture in progress     AFB CULTURE STAIN No acid fast bacilli seen.    Narrative:       Left foot wound    Culture, Anaerobe [[id number]]  (Abnormal) Collected:  07/14/19 1728    Order Status:  Completed Specimen:  Wound from Toe, Left Foot Updated:  07/18/19 1407     Anaerobic Culture PEPTONIPHILUS HAREI  Few      Narrative:       Left 5th metatarsal    Blood culture [[id number]] Collected:  07/13/19 0358    Order Status:  Completed Specimen:  Blood Updated:  07/18/19 0612     Blood Culture, Routine No growth after 5 days.    Blood culture [[id number]] Collected:  07/13/19 0358    Order Status:  Completed Specimen:  Blood Updated:  07/18/19 0612     Blood Culture, Routine  No growth after 5 days.    Culture, Anaerobe [[id number]] Collected:  07/17/19 1659    Order Status:  Sent Specimen:  Tissue from Foot, Left Updated:  07/17/19 1659    Aerobic culture [[id number]] Collected:  07/17/19 1659    Order Status:  Sent Specimen:  Tissue from Foot, Left Updated:  07/17/19 1659    Fungus culture [[id number]] Collected:  07/17/19 1659    Order Status:  Sent Specimen:  Tissue from Foot, Left Updated:  07/17/19 1659    Gram stain [[id number]] Collected:  07/17/19 1659    Order Status:  Sent Specimen:  Tissue from Foot, Left Updated:  07/17/19 1659    AFB Culture & Smear [[id number]] Collected:  07/17/19 1659    Order Status:  Sent Specimen:  Tissue from Foot, Left Updated:  07/17/19 1659    Gram stain [[id number]] Collected:  07/17/19 1214    Order Status:  Completed Specimen:  Wound from Foot, Left Updated:  07/17/19 1403     Gram Stain Result No WBC's      No organisms seen    Narrative:       Left foot wound    Fungus culture [[id number]] Collected:  07/17/19 1214    Order Status:  Sent Specimen:  Wound from Foot, Left Updated:  07/17/19 1228    Culture, Anaerobe [[id number]]  (Abnormal) Collected:  07/12/19 1257    Order Status:  Completed Specimen:  Wound from Foot, Left Updated:  07/16/19 1436     Anaerobic Culture FINEGOLDIA MAGNA  Many      Aerobic culture [[id number]]  (Abnormal) Collected:  07/14/19 1728    Order Status:  Completed Specimen:  Wound from Toe, Left Foot Updated:  07/16/19 0850     Aerobic Bacterial Culture STREPTOCOCCUS AGALACTIAE (GROUP B)  Rare  Beta-hemolytic streptococci are routinely susceptible to   penicillins,cephalosporins and carbapenems.  Susceptibility testing not routinely performed  Skin janes also present      Narrative:       Left 5th metatarsal    Aerobic culture [[id number]]  (Abnormal)  (Susceptibility) Collected:  07/12/19 1257    Order Status:  Completed Specimen:  Wound from Foot, Left Updated:  07/15/19 0953     Aerobic Bacterial Culture STREPTOCOCCUS  AGALACTIAE (GROUP B)  Few  Beta-hemolytic streptococci are routinely susceptible to   penicillins,cephalosporins and carbapenems.  Susceptibility testing not routinely performed        STAPHYLOCOCCUS AUREUS  Few      Blood culture x two cultures. Draw prior to antibiotics. [[id number]]  (Abnormal) Collected:  07/12/19 1044    Order Status:  Completed Specimen:  Blood from Peripheral, Antecubital, Right Updated:  07/14/19 0947     Blood Culture, Routine Gram stain mary bottle: Gram positive cocci in chains resembling Strep      Results called to and read back by:Farrah Ayala RN 07/12/2019        22:47      Gram stain aer bottle: Gram positive cocci in chains resembling Strep       07/13/2019  06:46      STREPTOCOCCUS AGALACTIAE (GROUP B)  Beta-hemolytic streptococci are routinely susceptible to   penicillins,cephalosporins and carbapenems.  For susceptibility see order #[id number]      Narrative:       Aerobic and anaerobic    Blood culture x two cultures. Draw prior to antibiotics. [[id number]]  (Abnormal)  (Susceptibility) Collected:  07/12/19 1044    Order Status:  Completed Specimen:  Blood from Peripheral, Antecubital, Left Updated:  07/14/19 0947     Blood Culture, Routine Gram stain mary bottle: Gram positive cocci in chains resembling Strep      Results called to and read back by:Farrah Ayala RN 07/12/2019        22:46      Gram stain aer bottle: Gram positive cocci in chains resembling Strep       07/13/2019  06:43      STREPTOCOCCUS AGALACTIAE (GROUP B)  Beta-hemolytic streptococci are routinely susceptible to   penicillins,cephalosporins and carbapenems.      Narrative:       Aerobic and anaerobic           Imaging Results          MRI Foot (Forefoot) Left Without Contrast (Final result)  Result time 07/12/19 19:07:38    Final result by Bob Oglesby MD (07/12/19 19:07:38)                 Impression:      Ulceration involving the lateral plantar aspect of the distal left foot, with findings  concerning for exposed bone involving the distal aspect of the 5th metatarsal and proximal phalange of the 5th toe.  There are associated changes concerning for osteomyelitis involving the distal aspect of the 5th metatarsal and entirety of the right toe.  There is osseous hyperemia involving the proximal phalange of the 2nd toe, early changes of osteomyelitis are a consideration no definite low signal on T1 at this time.      Electronically signed by: Bob Oglesby MD  Date:    07/12/2019  Time:    19:07             Narrative:    EXAMINATION:  MRI FOOT (FOREFOOT) LEFT WITHOUT CONTRAST    CLINICAL HISTORY:  Open wound of ankle, foot and toes;    TECHNIQUE:  Multiplanar multisequence MRI images of the left forefoot were obtained without administration of IV contrast.    COMPARISON:  Radiograph 07/12/2019    FINDINGS:  There is soft tissue ulceration involving the lateral plantar aspect of the right foot, noting there appears to be exposed bone in the location, likely involving the distal aspect of the 5th metatarsal head, and proximal phalange of the 5th toe. There is diffusely increased STIR signal within the distal aspect of the 5th metatarsal, and throughout the phalanges of the 5th toe. There is corresponding low T1 signal involving the same regions, concerning for osteomyelitis. There is additional abnormal STIR signal within the base of the proximal phalange of the 2nd toe, without convincing low signal on T1, suggesting osseous hyperemia although early changes of osteomyelitis remain a consideration. There is edema and induration about the open wound, no convincing focal organized fluid collection.  The remainder of the osseous structures are grossly unremarkable. No significant joint effusions.  There is reactive edema about the intrinsic musculature of the foot, as well as along the dorsal surface.                                X-Ray Foot Complete Left (Final result)  Result time 07/12/19 11:06:16     Final result by Chandan Montana MD (07/12/19 11:06:16)                 Impression:      Abnormal examination with findings strongly suggesting osteomyelitis involving the base of the proximal phalanx of the left 5th toe and likely the head of the 5th metatarsal as well.    This report was flagged in Epic as abnormal.      Electronically signed by: Chandan Montana MD  Date:    07/12/2019  Time:    11:06             Narrative:    EXAMINATION:  XR FOOT COMPLETE 3 VIEW LEFT    TECHNIQUE:  Three views of the left foot were obtained, with AP, lateral, and oblique projections submitted.    COMPARISON:  No relevant comparison examinations are currently available.    FINDINGS:  Focal soft tissue loss/irregularity involving the region of the left 5th MTP joint is observed, with gas present within the soft tissues at this level, the findings likely related to a clinically evident ulceration.  There is focal lytic bone destruction involving the base of the proximal phalanx of the 5th toe, and possibly involving the head of the 5th metatarsal as well, this radiographic appearance strongly suggesting osteomyelitis.  The remaining osseous structures appear intact, with no evidence of recent fracture and no additional areas of focal lytic bone destruction noted.  Some arterial vascular calcification in the soft tissues about the ankle is incidentally observed.                               X-Ray Chest AP Portable (Final result)  Result time 07/12/19 10:48:19    Final result by Chandan Montana MD (07/12/19 10:48:19)                 Impression:      No significant intrathoracic abnormality.  Allowing for differences in projection and a poorer inspiratory depth level on the current examination, there has been no significant detrimental interval change in the appearance of the chest since 05/03/2016.      Electronically signed by: Chandan Montana MD  Date:    07/12/2019  Time:    10:48             Narrative:    EXAMINATION:  XR CHEST AP  "PORTABLE    CLINICAL HISTORY:  hyperglycemia;    COMPARISON:  Comparison is made to the most recent prior chest radiograph, dated 05/03/2016.    FINDINGS:  Heart size is normal, as is the appearance of the pulmonary vascularity.  Lung zones are clear, and free of significant airspace consolidation or volume loss.  No pleural fluid.  No abnormal mediastinal widening.  No pneumothorax.  Incidental note is made of some spurring at the right acromioclavicular joint level.                                Estimated body mass index is 24.54 kg/m² as calculated from the following:    Height as of this encounter: 6' 1" (1.854 m).    Weight as of this encounter: 84.4 kg (186 lb).    I & O (Last 24H):    Intake/Output Summary (Last 24 hours) at 7/20/2019 1216  Last data filed at 7/19/2019 1600  Gross per 24 hour   Intake --   Output 1000 ml   Net -1000 ml       Estimated Creatinine Clearance: 98.8 mL/min (based on SCr of 1 mg/dL).    ASSESSMENT/PLAN:     Active Problems:    Active Diagnoses:     Diagnosis Date Noted POA    PRINCIPAL PROBLEM:  Acute osteomyelitis [M86.10] x-ray left foot - strongly suggesting osteomyelitis involving the base of the proximal phalanx of the left 5th toe and likely the head of the 5th metatarsal as well.        recently lost his insurance and has been unable to follow up with wound care.  His last follow-up with primary care provider and podiatrist was about 2 years.  He has been managing his right foot ulcer at home with dressing but over the past 2 weeks an ulcer on his left foot has developed. He denies any pain or injury- has chronic numbness of bilateral lower extremities and hands from diabetic neuropathy.  Started on IV Zosyn and vanc.  Podiatry and Infectious Disease consult.  aerobic culture with Group B strep.  Add vancomycin for Staph aureus  (monitor for toxicity)    07/14/2019   s/p I&D and 5th ray amputation in the OR  on  07/14/2019.  No weight-bearing left lower x-ray ALISSA ordered " to evaluate vascular status intraoperative Cultures pending . clean margin cultures and pathology pending. Deescalated from vanc and zosyn to cefazolin 6 gram IV cont infusion.  blood cultures from 07/13/2019 no growth.  Continues to have nausea vomiting since unable to tolerate anything by mouth.  Started on IV hydration and Reglan 07/12/2019 Yes    Sepsis [A41.9] /bacteremia - blood cultures from 07/12/2019 group B Streptococcus. secondary to diabetic foot ulcer.  Elevated ESR greater than 120 As above 07/12/2019 Yes    Leukocytosis [D72.829] 14 secondary to sepsis. resolved  07/12/2019 Unknown    Anemia [D64.9] hemoglobin at 12 10.8 with IV hydration-->, normocytic.  Monitor 07/12/2019 Unknown    Type 2 diabetes mellitus with left eye affected by mild nonproliferative retinopathy without macular edema, without long-term current use of insulin [E11.3292]At baseline only takes insulin once a day stopped  taking more than a week because of nausea and vomiting and poor oral intake.  Does not check fingersticks regularly at home. recently lost his insurance.  Obtain HbA1c elevated greater than 14.  Continue insulin drip.  Endocrine evaluation 08/11/2017 Yes       Chronic    Diabetic ketoacidosis without coma associated with type 2 diabetes mellitus [E11.10]  Found to be in DKA in the emergency department with beta hydroxybutyrate elevated at 4.1.  Started on insulin drip.  Received normal saline in the emergency department    07/13/2019  Anion gap resolved- bicarbonate 23, insulin drip discontinued.  Started on    Started on transitional insulin drip as NPO from midnight. Start Levemir 14 units qhs at  9pm and Novolog 5 units with meals       Hypokalemia-hypomagnesemia replaced    Hypophosphatemia- placed   05/30/2017 Unknown    Type 2 diabetes mellitus with diabetic neuropathy, with long-term current use of insulin [E11.40, Z79.4] 05/03/2016 Not Applicable       Chronic    Mixed hyperlipidemia [E78.2] continue  with Lipitor 08/12/2014 Yes    Essential hypertension [I10] blood pressure controlled without medications.  Monitor  06/06/2013 Yes       Chronic               Disposition- home    DVT prophylaxis addressed with:  Brian Rust M.D.

## 2019-07-21 LAB
ALBUMIN SERPL BCP-MCNC: 2.2 G/DL (ref 3.5–5.2)
ALBUMIN SERPL BCP-MCNC: 2.4 G/DL (ref 3.5–5.2)
ALP SERPL-CCNC: 106 U/L (ref 55–135)
ALP SERPL-CCNC: 111 U/L (ref 55–135)
ALT SERPL W/O P-5'-P-CCNC: <5 U/L (ref 10–44)
ALT SERPL W/O P-5'-P-CCNC: <5 U/L (ref 10–44)
ANION GAP SERPL CALC-SCNC: 10 MMOL/L (ref 8–16)
ANION GAP SERPL CALC-SCNC: 9 MMOL/L (ref 8–16)
AST SERPL-CCNC: 13 U/L (ref 10–40)
AST SERPL-CCNC: 17 U/L (ref 10–40)
BASOPHILS # BLD AUTO: 0.02 K/UL (ref 0–0.2)
BASOPHILS NFR BLD: 0.3 % (ref 0–1.9)
BILIRUB SERPL-MCNC: 0.2 MG/DL (ref 0.1–1)
BILIRUB SERPL-MCNC: 0.2 MG/DL (ref 0.1–1)
BUN SERPL-MCNC: 6 MG/DL (ref 6–20)
BUN SERPL-MCNC: 7 MG/DL (ref 6–20)
CALCIUM SERPL-MCNC: 8.5 MG/DL (ref 8.7–10.5)
CALCIUM SERPL-MCNC: 8.8 MG/DL (ref 8.7–10.5)
CHLORIDE SERPL-SCNC: 96 MMOL/L (ref 95–110)
CHLORIDE SERPL-SCNC: 97 MMOL/L (ref 95–110)
CO2 SERPL-SCNC: 34 MMOL/L (ref 23–29)
CO2 SERPL-SCNC: 37 MMOL/L (ref 23–29)
CREAT SERPL-MCNC: 1 MG/DL (ref 0.5–1.4)
CREAT SERPL-MCNC: 1.1 MG/DL (ref 0.5–1.4)
DIFFERENTIAL METHOD: ABNORMAL
EOSINOPHIL # BLD AUTO: 0.1 K/UL (ref 0–0.5)
EOSINOPHIL NFR BLD: 1.2 % (ref 0–8)
ERYTHROCYTE [DISTWIDTH] IN BLOOD BY AUTOMATED COUNT: 12.7 % (ref 11.5–14.5)
EST. GFR  (AFRICAN AMERICAN): >60 ML/MIN/1.73 M^2
EST. GFR  (AFRICAN AMERICAN): >60 ML/MIN/1.73 M^2
EST. GFR  (NON AFRICAN AMERICAN): >60 ML/MIN/1.73 M^2
EST. GFR  (NON AFRICAN AMERICAN): >60 ML/MIN/1.73 M^2
GLUCOSE SERPL-MCNC: 157 MG/DL (ref 70–110)
GLUCOSE SERPL-MCNC: 195 MG/DL (ref 70–110)
HCT VFR BLD AUTO: 34.7 % (ref 40–54)
HGB BLD-MCNC: 10.7 G/DL (ref 14–18)
IMM GRANULOCYTES # BLD AUTO: 0.02 K/UL (ref 0–0.04)
IMM GRANULOCYTES NFR BLD AUTO: 0.3 % (ref 0–0.5)
LYMPHOCYTES # BLD AUTO: 1.7 K/UL (ref 1–4.8)
LYMPHOCYTES NFR BLD: 25.1 % (ref 18–48)
MAGNESIUM SERPL-MCNC: 1.6 MG/DL (ref 1.6–2.6)
MCH RBC QN AUTO: 27.9 PG (ref 27–31)
MCHC RBC AUTO-ENTMCNC: 30.8 G/DL (ref 32–36)
MCV RBC AUTO: 91 FL (ref 82–98)
MONOCYTES # BLD AUTO: 0.6 K/UL (ref 0.3–1)
MONOCYTES NFR BLD: 8.8 % (ref 4–15)
NEUTROPHILS # BLD AUTO: 4.4 K/UL (ref 1.8–7.7)
NEUTROPHILS NFR BLD: 64.3 % (ref 38–73)
NRBC BLD-RTO: 0 /100 WBC
PHOSPHATE SERPL-MCNC: 2.3 MG/DL (ref 2.7–4.5)
PLATELET # BLD AUTO: 329 K/UL (ref 150–350)
PMV BLD AUTO: 10 FL (ref 9.2–12.9)
POCT GLUCOSE: 212 MG/DL (ref 70–110)
POCT GLUCOSE: 228 MG/DL (ref 70–110)
POCT GLUCOSE: 239 MG/DL (ref 70–110)
POCT GLUCOSE: 96 MG/DL (ref 70–110)
POTASSIUM SERPL-SCNC: 2.7 MMOL/L (ref 3.5–5.1)
POTASSIUM SERPL-SCNC: 2.7 MMOL/L (ref 3.5–5.1)
PROT SERPL-MCNC: 7 G/DL (ref 6–8.4)
PROT SERPL-MCNC: 7.3 G/DL (ref 6–8.4)
RBC # BLD AUTO: 3.83 M/UL (ref 4.6–6.2)
SODIUM SERPL-SCNC: 141 MMOL/L (ref 136–145)
SODIUM SERPL-SCNC: 142 MMOL/L (ref 136–145)
WBC # BLD AUTO: 6.8 K/UL (ref 3.9–12.7)

## 2019-07-21 PROCEDURE — 25000003 PHARM REV CODE 250: Performed by: HOSPITALIST

## 2019-07-21 PROCEDURE — 63600175 PHARM REV CODE 636 W HCPCS: Performed by: GENERAL ACUTE CARE HOSPITAL

## 2019-07-21 PROCEDURE — 99232 PR SUBSEQUENT HOSPITAL CARE,LEVL II: ICD-10-PCS | Mod: ,,, | Performed by: INTERNAL MEDICINE

## 2019-07-21 PROCEDURE — 63600175 PHARM REV CODE 636 W HCPCS: Performed by: PHYSICIAN ASSISTANT

## 2019-07-21 PROCEDURE — 80053 COMPREHEN METABOLIC PANEL: CPT

## 2019-07-21 PROCEDURE — S5571 INSULIN DISPOS PEN 3 ML: HCPCS | Performed by: GENERAL ACUTE CARE HOSPITAL

## 2019-07-21 PROCEDURE — 25000003 PHARM REV CODE 250: Performed by: PHYSICIAN ASSISTANT

## 2019-07-21 PROCEDURE — 99232 SBSQ HOSP IP/OBS MODERATE 35: CPT | Mod: ,,, | Performed by: INTERNAL MEDICINE

## 2019-07-21 PROCEDURE — 36415 COLL VENOUS BLD VENIPUNCTURE: CPT

## 2019-07-21 PROCEDURE — 84100 ASSAY OF PHOSPHORUS: CPT

## 2019-07-21 PROCEDURE — 85025 COMPLETE CBC W/AUTO DIFF WBC: CPT

## 2019-07-21 PROCEDURE — 63600175 PHARM REV CODE 636 W HCPCS: Performed by: HOSPITALIST

## 2019-07-21 PROCEDURE — 83735 ASSAY OF MAGNESIUM: CPT

## 2019-07-21 PROCEDURE — 20600001 HC STEP DOWN PRIVATE ROOM

## 2019-07-21 PROCEDURE — 25000003 PHARM REV CODE 250: Performed by: NURSE PRACTITIONER

## 2019-07-21 RX ORDER — INSULIN ASPART 100 [IU]/ML
6 INJECTION, SOLUTION INTRAVENOUS; SUBCUTANEOUS
Status: DISCONTINUED | OUTPATIENT
Start: 2019-07-21 | End: 2019-07-24

## 2019-07-21 RX ORDER — INSULIN ASPART 100 [IU]/ML
6-8 INJECTION, SOLUTION INTRAVENOUS; SUBCUTANEOUS
Status: DISCONTINUED | OUTPATIENT
Start: 2019-07-21 | End: 2019-07-21

## 2019-07-21 RX ORDER — POTASSIUM CHLORIDE 750 MG/1
50 CAPSULE, EXTENDED RELEASE ORAL EVERY 4 HOURS
Status: COMPLETED | OUTPATIENT
Start: 2019-07-21 | End: 2019-07-22

## 2019-07-21 RX ADMIN — INSULIN ASPART 1 UNITS: 100 INJECTION, SOLUTION INTRAVENOUS; SUBCUTANEOUS at 09:07

## 2019-07-21 RX ADMIN — ENOXAPARIN SODIUM 40 MG: 100 INJECTION SUBCUTANEOUS at 04:07

## 2019-07-21 RX ADMIN — INSULIN ASPART 2 UNITS: 100 INJECTION, SOLUTION INTRAVENOUS; SUBCUTANEOUS at 04:07

## 2019-07-21 RX ADMIN — INSULIN ASPART 6 UNITS: 100 INJECTION, SOLUTION INTRAVENOUS; SUBCUTANEOUS at 04:07

## 2019-07-21 RX ADMIN — INSULIN DETEMIR 15 UNITS: 100 INJECTION, SOLUTION SUBCUTANEOUS at 10:07

## 2019-07-21 RX ADMIN — INSULIN ASPART 6 UNITS: 100 INJECTION, SOLUTION INTRAVENOUS; SUBCUTANEOUS at 11:07

## 2019-07-21 RX ADMIN — ATORVASTATIN CALCIUM 80 MG: 20 TABLET, FILM COATED ORAL at 09:07

## 2019-07-21 RX ADMIN — AMLODIPINE BESYLATE 5 MG: 5 TABLET ORAL at 09:07

## 2019-07-21 RX ADMIN — POTASSIUM CHLORIDE 50 MEQ: 750 CAPSULE, EXTENDED RELEASE ORAL at 09:07

## 2019-07-21 RX ADMIN — INSULIN ASPART 1 UNITS: 100 INJECTION, SOLUTION INTRAVENOUS; SUBCUTANEOUS at 10:07

## 2019-07-21 RX ADMIN — INSULIN ASPART 6 UNITS: 100 INJECTION, SOLUTION INTRAVENOUS; SUBCUTANEOUS at 08:07

## 2019-07-21 RX ADMIN — CEFAZOLIN 6 G: 1 INJECTION, POWDER, FOR SOLUTION INTRAMUSCULAR; INTRAVENOUS at 01:07

## 2019-07-21 RX ADMIN — SODIUM CHLORIDE: 0.9 INJECTION, SOLUTION INTRAVENOUS at 06:07

## 2019-07-21 NOTE — PROGRESS NOTES
Progress Note  Hospital Medicine    Admit Date: 7/12/2019  Length of Stay:  LOS: 9 days     SUBJECTIVE:         Follow-up For:  Bacteremia due to group B Streptococcus    HPI/Interval history (See H&P for complete P,F,SHx) :   Over view  Indio Ryder Jr. is a 49 y.o. male with a PMH of T2DM (poorly  controlled, with long term insulin use) and HTN who presented to the ED on 7/12/2019 diagnosed with  Vomiting (vomiting for past 4 days, denies sick contacts)   Oriented x3 accompanied by daughter at the bedside.  Mentions that he wears lower extremity stockings for edema. Reportedly formed a new ulcer on the left lateral aspect of the foot about 10 days back.  had 4-5 days of nausea and vomiting. Vomiting is non-bloody, No abdominal pain..  It occurs immediately after eating and he has not been able to keep anything down. Associated with fevers and chills but no objective temperature measurement.  At baseline only takes insulin once a day stop taking more than a week because of nausea and vomiting and poor oral intake.  Does not check fingersticks regularly at home. recently lost his insurance and has been unable to follow up with wound care.  His last follow-up with primary care provider and podiatrist was about 2 years.  He has been managing his right foot ulcer at home with dressing but over the past 2 weeks an ulcer on his left foot has developed. He denies any pain or injury- has chronic numbness of bilateral lower extremities and hands from diabetic neuropathy.  Found to be in DKA in the emergency department with beta hydroxybutyrate elevated at 4.1.  Started on insulin drip    07/13/2019   Temperature of 101.5 last night.  Anion gap resolved- bicarbonate 23, insulin drip discontinued.  Started on Levemir 23 units q.a.m. 7 units as part t.i.d. with meals with low-dose sliding scale.  Diabetic diet and NPO from midnight for possible intervention.  MRI of the foot- Ulceration involving the lateral plantar  aspect of the distal left foot, with findings concerning for exposed bone involving the distal aspect of the 5th metatarsal and proximal phalange of the 5th toe.  There are associated changes concerning for osteomyelitis involving the distal aspect of the 5th metatarsal and entirety of the right toe.  There is osseous hyperemia involving the proximal phalange of the 2nd toe, early changes of osteomyelitis are a consideration . aerobic culture  and blood culture with Group B strep. discontinued vancomycin       07/14/2019  Had urinary retention of 800 mL but spontaneously voided.  Started on transitional insulin drip as NPO from midnight for OR today.  Hypokalemia repeat.  Blood cultures daily until clear.  Aerobic cultures with group B Streptococcus and Staph aureus.  Added vancomycin ( monitor for toxicity)    Interval history   s/p I&D and 5th ray amputation in the OR  on  07/14/2019.  ALISSA ordered to evaluate vascular status intraoperative Cultures pending . clean margin cultures and pathology pending. Deescalated from vanc and zosyn to cefazolin 6 gram IV cont infusion.  blood cultures from 07/13/2019 no growth.  Continues to have nausea vomiting since unable to tolerate anything by mouth.  Started on IV hydration and Reglan    Review of Systems: List if applicable  Constitutional: Positive for activity change, appetite change (Poor appetite for the last and), chills, fatigue, fever ( weight loss) and unexpected weight change.   HENT: Negative for congestion, ear discharge, ear pain, facial swelling and sore throat.    Eyes: Negative for pain, discharge and itching.   Respiratory: Positive for shortness of breath ( at rest and exertion ). Negative for cough, chest tightness and wheezing.    Cardiovascular: Positive for leg swelling ( chronic). Negative for chest pain and palpitations.   Gastrointestinal: Positive for vomiting. Negative for abdominal distention, abdominal pain, blood in stool, constipation and  diarrhea.   Endocrine: Negative for cold intolerance.   Genitourinary: Negative for dysuria, hematuria and urgency.   Musculoskeletal: Negative for arthralgias, gait problem, myalgias, neck pain and neck stiffness.   Skin: Negative for color change, pallor and rash.   Allergic/Immunologic: Negative for environmental allergies.   Neurological: Positive for weakness and numbness ( bilateral lower extremities and hands attributes to diabetic neuropathy). Negative for dizziness, syncope, light-headedness and headaches.   Hematological: Negative for adenopathy.   Psychiatric/Behavioral: Negative for agitation, behavioral problems and confusion.     OBJECTIVE:     Vital Signs Range (Last 24H):  Temp:  [96.3 °F (35.7 °C)-99.3 °F (37.4 °C)]   Pulse:  []   Resp:  [18-20]   BP: (119-163)/(66-99)   SpO2:  [91 %-98 %]     Physical Exam:  Constitutional: He is oriented to person, place, and time. He appears well-developed and well-nourished.   HENT:   Head: Normocephalic and atraumatic.   Mouth/Throat: Oropharynx is clear and moist. No oropharyngeal exudate.   Eyes: Pupils are equal, round, and reactive to light. Conjunctivae and EOM are normal. Right eye exhibits no discharge. Left eye exhibits no discharge. No scleral icterus.   Neck: Normal range of motion. Neck supple. No JVD present. No tracheal deviation present. No thyromegaly present.   Cardiovascular: Normal rate, regular rhythm and normal heart sounds. Exam reveals no gallop and no friction rub.   No murmur heard.  Pulmonary/Chest: Effort normal and breath sounds normal. No stridor. No respiratory distress. He has no wheezes. He has no rales.   Abdominal: Soft. Bowel sounds are normal. He exhibits no distension and no mass. There is no tenderness. There is no guarding.   Musculoskeletal: Normal range of motion. He exhibits edema.   Lymphadenopathy:     He has no cervical adenopathy.   Neurological: He is oriented to person, place, and time. No cranial nerve  deficit.   Able to move upper and lower extremities without limitation   Skin: Skin is warm and dry.   Left foot: 6cm ulcer with exposed subcutaneous fat and eschar with foul smelling, purulent drainage overlying lateral aspect of left 5th MTP.     Right foot: 1.5cm clean based ulcer at base of right 5th MTP joint. Right great toe and second toe surgically amputated.     Psychiatric: He has a normal mood and affect. His behavior is normal.   Nursing note and vitals reviewed    Medications:  Medication list was reviewed and changes noted under Assessment/Plan.      Current Facility-Administered Medications:     0.9%  NaCl infusion, , Intravenous, Continuous, Vicente Wick MD, Last Rate: 75 mL/hr at 07/21/19 0613    acetaminophen tablet 650 mg, 650 mg, Oral, Q6H PRN, Vicente Wick MD, 650 mg at 07/17/19 0458    amLODIPine tablet 5 mg, 5 mg, Oral, Daily, Vicente Wick MD, 5 mg at 07/21/19 0907    atorvastatin tablet 80 mg, 80 mg, Oral, Daily, Vicente Wick MD, 80 mg at 07/21/19 0900    calcium carbonate 200 mg calcium (500 mg) chewable tablet 1,000 mg, 1,000 mg, Oral, BID PRN, Antonio Tate PA-C, 1,000 mg at 07/14/19 1850    ceFAZolin (ANCEF) 6 g in dextrose 5 % 500 mL CONTINUOUS INFUSION, 6 g, Intravenous, Q24H, RAMILA Kinsey, 6 g at 07/21/19 1300    dextrose 10% (D10W) Bolus, 12.5 g, Intravenous, PRN, Nakul Patel MD    dextrose 10% (D10W) Bolus, 25 g, Intravenous, PRN, Nakul Patel MD    enoxaparin injection 40 mg, 40 mg, Subcutaneous, Daily, Vicente Wick MD, 40 mg at 07/20/19 1643    glucagon (human recombinant) injection 1 mg, 1 mg, Intramuscular, PRN, Nakul Patel MD    glucose chewable tablet 16 g, 16 g, Oral, PRN, Nakul Patel MD    glucose chewable tablet 24 g, 24 g, Oral, PRN, Nakul Patel MD    hydrALAZINE tablet 25 mg, 25 mg, Oral, Q8H PRN, Vicente Wick MD, Stopped at 07/19/19 2112    insulin aspart U-100 pen 0-5 Units, 0-5 Units,  Subcutaneous, QID (AC + HS) PRN, Nakul Patel MD, 1 Units at 07/21/19 0908    insulin aspart U-100 pen 6 Units, 6 Units, Subcutaneous, TIDWM, Nakul Patel MD    insulin detemir U-100 pen 15 Units, 15 Units, Subcutaneous, QHS, Nakul Patel MD    ondansetron injection 8 mg, 8 mg, Intravenous, Q8H PRN, Zeynep Duke PA-C    promethazine (PHENERGAN) 12.5 mg in dextrose 5 % 50 mL IVPB, 12.5 mg, Intravenous, Q6H PRN, Zeynep Duke PA-C, Last Rate: 150 mL/hr at 07/18/19 0012, 12.5 mg at 07/18/19 0012    sodium chloride 0.9% flush 10 mL, 10 mL, Intravenous, PRN, Vicente Wick MD    sodium chloride 0.9% flush 10 mL, 10 mL, Intravenous, PRN, Mariela Damon MD    acetaminophen, calcium carbonate, Dextrose 10% Bolus, Dextrose 10% Bolus, glucagon (human recombinant), glucose, glucose, hydrALAZINE, insulin aspart U-100, ondansetron, promethazine (PHENERGAN) IVPB, sodium chloride 0.9%, sodium chloride 0.9%    Laboratory/Diagnostic Data:  Reviewed and noted in plan where applicable- Please see chart for full lab data.    Recent Labs   Lab 07/19/19  0337 07/20/19  0418 07/21/19  0350   WBC 7.74 7.21 6.80   HGB 11.3* 11.1* 10.7*   HCT 35.8* 34.8* 34.7*    331 329       Recent Labs   Lab 07/19/19  0337  07/20/19  0418 07/20/19  0921 07/20/19  1938 07/21/19  0350 07/21/19  0857   NA  --    < >  --  143 138  --  142   K  --    < >  --  2.9* 2.8*  --  2.7*   CL  --    < >  --  98 94*  --  96   CO2  --    < >  --  32* 32*  --  37*   BUN  --    < >  --  6 5*  --  6   CREATININE  --    < >  --  1.0 1.3  --  1.1   GLU  --    < >  --  216* 440*  --  195*   CALCIUM  --    < >  --  8.7 8.1*  --  8.8   MG 1.7  --  1.7  --   --  1.6  --    PHOS 2.4*  --  2.4*  --   --  2.3*  --     < > = values in this interval not displayed.       Recent Labs   Lab 07/20/19  0921 07/20/19  1938 07/21/19  0857   ALKPHOS 103 101 111   ALT <5* <5* <5*   AST 14 12 17   ALBUMIN 2.1* 2.1* 2.4*   PROT 7.0 6.8 7.3   BILITOT  0.3 0.2 0.2        Microbiology labs for the last week  Microbiology Results (last 7 days)     Procedure Component Value Units Date/Time    Blood culture [[id number]] Collected:  07/15/19 0305    Order Status:  Completed Specimen:  Blood from Antecubital, Right Updated:  07/20/19 0822     Blood Culture, Routine No growth after 5 days.    Blood culture [[id number]] Collected:  07/15/19 0305    Order Status:  Completed Specimen:  Blood from Antecubital, Right Updated:  07/20/19 0822     Blood Culture, Routine No growth after 5 days.    Aerobic culture [[id number]] Collected:  07/17/19 1214    Order Status:  Completed Specimen:  Wound from Foot, Left Updated:  07/19/19 1106     Aerobic Bacterial Culture Skin janes,  no predominant organism    Narrative:       Left foot wound    Culture, Anaerobe [[id number]] Collected:  07/17/19 1214    Order Status:  Completed Specimen:  Wound from Foot, Left Updated:  07/19/19 1054     Anaerobic Culture Culture in progress    Narrative:       Left foot wound    Blood culture [[id number]] Collected:  07/14/19 0629    Order Status:  Completed Specimen:  Blood Updated:  07/19/19 1012     Blood Culture, Routine No growth after 5 days.    AFB Culture & Smear [[id number]] Collected:  07/17/19 1214    Order Status:  Completed Specimen:  Wound from Foot, Left Updated:  07/18/19 2127     AFB Culture & Smear Culture in progress     AFB CULTURE STAIN No acid fast bacilli seen.    Narrative:       Left foot wound    Culture, Anaerobe [[id number]]  (Abnormal) Collected:  07/14/19 1728    Order Status:  Completed Specimen:  Wound from Toe, Left Foot Updated:  07/18/19 1407     Anaerobic Culture PEPTONIPHILUS HAREI  Few      Narrative:       Left 5th metatarsal    Blood culture [[id number]] Collected:  07/13/19 0358    Order Status:  Completed Specimen:  Blood Updated:  07/18/19 0612     Blood Culture, Routine No growth after 5 days.    Blood culture [[id number]] Collected:  07/13/19 0358    Order Status:   Completed Specimen:  Blood Updated:  07/18/19 0612     Blood Culture, Routine No growth after 5 days.    Culture, Anaerobe [[id number]] Collected:  07/17/19 1659    Order Status:  Sent Specimen:  Tissue from Foot, Left Updated:  07/17/19 1659    Aerobic culture [[id number]] Collected:  07/17/19 1659    Order Status:  Sent Specimen:  Tissue from Foot, Left Updated:  07/17/19 1659    Fungus culture [[id number]] Collected:  07/17/19 1659    Order Status:  Sent Specimen:  Tissue from Foot, Left Updated:  07/17/19 1659    Gram stain [[id number]] Collected:  07/17/19 1659    Order Status:  Sent Specimen:  Tissue from Foot, Left Updated:  07/17/19 1659    AFB Culture & Smear [[id number]] Collected:  07/17/19 1659    Order Status:  Sent Specimen:  Tissue from Foot, Left Updated:  07/17/19 1659    Gram stain [[id number]] Collected:  07/17/19 1214    Order Status:  Completed Specimen:  Wound from Foot, Left Updated:  07/17/19 1403     Gram Stain Result No WBC's      No organisms seen    Narrative:       Left foot wound    Fungus culture [[id number]] Collected:  07/17/19 1214    Order Status:  Sent Specimen:  Wound from Foot, Left Updated:  07/17/19 1228    Culture, Anaerobe [[id number]]  (Abnormal) Collected:  07/12/19 1257    Order Status:  Completed Specimen:  Wound from Foot, Left Updated:  07/16/19 1436     Anaerobic Culture FINEGOLDIA MAGNA  Many      Aerobic culture [[id number]]  (Abnormal) Collected:  07/14/19 1728    Order Status:  Completed Specimen:  Wound from Toe, Left Foot Updated:  07/16/19 0850     Aerobic Bacterial Culture STREPTOCOCCUS AGALACTIAE (GROUP B)  Rare  Beta-hemolytic streptococci are routinely susceptible to   penicillins,cephalosporins and carbapenems.  Susceptibility testing not routinely performed  Skin janes also present      Narrative:       Left 5th metatarsal    Aerobic culture [[id number]]  (Abnormal)  (Susceptibility) Collected:  07/12/19 1257    Order Status:  Completed Specimen:  Wound from Foot,  Left Updated:  07/15/19 0953     Aerobic Bacterial Culture STREPTOCOCCUS AGALACTIAE (GROUP B)  Few  Beta-hemolytic streptococci are routinely susceptible to   penicillins,cephalosporins and carbapenems.  Susceptibility testing not routinely performed        STAPHYLOCOCCUS AUREUS  Few             Imaging Results          MRI Foot (Forefoot) Left Without Contrast (Final result)  Result time 07/12/19 19:07:38    Final result by Bob Oglesby MD (07/12/19 19:07:38)                 Impression:      Ulceration involving the lateral plantar aspect of the distal left foot, with findings concerning for exposed bone involving the distal aspect of the 5th metatarsal and proximal phalange of the 5th toe.  There are associated changes concerning for osteomyelitis involving the distal aspect of the 5th metatarsal and entirety of the right toe.  There is osseous hyperemia involving the proximal phalange of the 2nd toe, early changes of osteomyelitis are a consideration no definite low signal on T1 at this time.      Electronically signed by: Bob Oglesby MD  Date:    07/12/2019  Time:    19:07             Narrative:    EXAMINATION:  MRI FOOT (FOREFOOT) LEFT WITHOUT CONTRAST    CLINICAL HISTORY:  Open wound of ankle, foot and toes;    TECHNIQUE:  Multiplanar multisequence MRI images of the left forefoot were obtained without administration of IV contrast.    COMPARISON:  Radiograph 07/12/2019    FINDINGS:  There is soft tissue ulceration involving the lateral plantar aspect of the right foot, noting there appears to be exposed bone in the location, likely involving the distal aspect of the 5th metatarsal head, and proximal phalange of the 5th toe. There is diffusely increased STIR signal within the distal aspect of the 5th metatarsal, and throughout the phalanges of the 5th toe. There is corresponding low T1 signal involving the same regions, concerning for osteomyelitis. There is additional abnormal STIR signal within the  base of the proximal phalange of the 2nd toe, without convincing low signal on T1, suggesting osseous hyperemia although early changes of osteomyelitis remain a consideration. There is edema and induration about the open wound, no convincing focal organized fluid collection.  The remainder of the osseous structures are grossly unremarkable. No significant joint effusions.  There is reactive edema about the intrinsic musculature of the foot, as well as along the dorsal surface.                                X-Ray Foot Complete Left (Final result)  Result time 07/12/19 11:06:16    Final result by Chandan Montana MD (07/12/19 11:06:16)                 Impression:      Abnormal examination with findings strongly suggesting osteomyelitis involving the base of the proximal phalanx of the left 5th toe and likely the head of the 5th metatarsal as well.    This report was flagged in Epic as abnormal.      Electronically signed by: Chandan Montana MD  Date:    07/12/2019  Time:    11:06             Narrative:    EXAMINATION:  XR FOOT COMPLETE 3 VIEW LEFT    TECHNIQUE:  Three views of the left foot were obtained, with AP, lateral, and oblique projections submitted.    COMPARISON:  No relevant comparison examinations are currently available.    FINDINGS:  Focal soft tissue loss/irregularity involving the region of the left 5th MTP joint is observed, with gas present within the soft tissues at this level, the findings likely related to a clinically evident ulceration.  There is focal lytic bone destruction involving the base of the proximal phalanx of the 5th toe, and possibly involving the head of the 5th metatarsal as well, this radiographic appearance strongly suggesting osteomyelitis.  The remaining osseous structures appear intact, with no evidence of recent fracture and no additional areas of focal lytic bone destruction noted.  Some arterial vascular calcification in the soft tissues about the ankle is incidentally observed.    "                            X-Ray Chest AP Portable (Final result)  Result time 07/12/19 10:48:19    Final result by Chandan Montana MD (07/12/19 10:48:19)                 Impression:      No significant intrathoracic abnormality.  Allowing for differences in projection and a poorer inspiratory depth level on the current examination, there has been no significant detrimental interval change in the appearance of the chest since 05/03/2016.      Electronically signed by: Chandan Montana MD  Date:    07/12/2019  Time:    10:48             Narrative:    EXAMINATION:  XR CHEST AP PORTABLE    CLINICAL HISTORY:  hyperglycemia;    COMPARISON:  Comparison is made to the most recent prior chest radiograph, dated 05/03/2016.    FINDINGS:  Heart size is normal, as is the appearance of the pulmonary vascularity.  Lung zones are clear, and free of significant airspace consolidation or volume loss.  No pleural fluid.  No abnormal mediastinal widening.  No pneumothorax.  Incidental note is made of some spurring at the right acromioclavicular joint level.                                Estimated body mass index is 24.54 kg/m² as calculated from the following:    Height as of this encounter: 6' 1" (1.854 m).    Weight as of this encounter: 84.4 kg (186 lb).    I & O (Last 24H):    Intake/Output Summary (Last 24 hours) at 7/21/2019 1435  Last data filed at 7/21/2019 0445  Gross per 24 hour   Intake 1520 ml   Output 1700 ml   Net -180 ml       Estimated Creatinine Clearance: 89.8 mL/min (based on SCr of 1.1 mg/dL).    ASSESSMENT/PLAN:     Active Problems:    Active Diagnoses:     Diagnosis Date Noted POA    PRINCIPAL PROBLEM:  Acute osteomyelitis [M86.10] x-ray left foot - strongly suggesting osteomyelitis involving the base of the proximal phalanx of the left 5th toe and likely the head of the 5th metatarsal as well.        recently lost his insurance and has been unable to follow up with wound care.  His last follow-up with primary care " provider and podiatrist was about 2 years.  He has been managing his right foot ulcer at home with dressing but over the past 2 weeks an ulcer on his left foot has developed. He denies any pain or injury- has chronic numbness of bilateral lower extremities and hands from diabetic neuropathy.  Started on IV Zosyn and vanc.  Podiatry and Infectious Disease consult.  aerobic culture with Group B strep.  Add vancomycin for Staph aureus  (monitor for toxicity)    07/14/2019   s/p I&D and 5th ray amputation in the OR  on  07/14/2019.  No weight-bearing left lower x-ray ALISSA ordered to evaluate vascular status intraoperative Cultures pending . clean margin cultures and pathology pending. Deescalated from vanc and zosyn to cefazolin 6 gram IV cont infusion.  blood cultures from 07/13/2019 no growth.  Continues to have nausea vomiting since unable to tolerate anything by mouth.  Started on IV hydration and Reglan 07/12/2019 Yes    Sepsis [A41.9] /bacteremia - blood cultures from 07/12/2019 group B Streptococcus. secondary to diabetic foot ulcer.  Elevated ESR greater than 120 As above 07/12/2019 Yes    Leukocytosis [D72.829] 14 secondary to sepsis. resolved  07/12/2019 Unknown    Anemia [D64.9] hemoglobin at 12 10.8 with IV hydration-->, normocytic.  Monitor 07/12/2019 Unknown    Type 2 diabetes mellitus with left eye affected by mild nonproliferative retinopathy without macular edema, without long-term current use of insulin [E11.3292]At baseline only takes insulin once a day stopped  taking more than a week because of nausea and vomiting and poor oral intake.  Does not check fingersticks regularly at home. recently lost his insurance.  Obtain HbA1c elevated greater than 14.  Continue insulin drip.  Endocrine evaluation 08/11/2017 Yes       Chronic    Diabetic ketoacidosis without coma associated with type 2 diabetes mellitus [E11.10]  Found to be in DKA in the emergency department with beta hydroxybutyrate elevated at  4.1.  Started on insulin drip.  Received normal saline in the emergency department    07/13/2019  Anion gap resolved- bicarbonate 23, insulin drip discontinued.  Started on    Started on transitional insulin drip as NPO from midnight. Start Levemir 14 units qhs at  9pm and Novolog 5 units with meals       Hypokalemia-hypomagnesemia replaced    Hypophosphatemia- placed   05/30/2017 Unknown    Type 2 diabetes mellitus with diabetic neuropathy, with long-term current use of insulin [E11.40, Z79.4] 05/03/2016 Not Applicable       Chronic    Mixed hyperlipidemia [E78.2] continue with Lipitor 08/12/2014 Yes    Essential hypertension [I10] blood pressure controlled without medications.  Monitor  06/06/2013 Yes       Chronic               Disposition- home    DVT prophylaxis addressed with:  Brian Rust M.D.

## 2019-07-21 NOTE — PLAN OF CARE
Problem: Adult Inpatient Plan of Care  Goal: Plan of Care Review  POC reviewed with patient, uneventful shift. NO distress noted, iv site intact. Safety maintained call light in reach with siderails up x2. Patient is AAO x 4 without any concerns.

## 2019-07-21 NOTE — ASSESSMENT & PLAN NOTE
- DKA resolved   -Glucose levels for the past 24hrs ranging between (200-400's) ABOVE GOAL   -Glucose level goal inpatient (140 -180)   -Pt was switch to MDI (Levemir 12 units and Novolog 4 units pre-meals) yesterday 7/20/19    Plan:   -Increase total insulin daily requirements by 20%  -Increase Detemir to 15 units qHS   -Increase pre-meal Novolog to 6 unit SC qAC   -C/w low dose correction insulin qAC and HS   -Accuchecks qAC and HS     Discharge Recommendations: TBD

## 2019-07-21 NOTE — PROGRESS NOTES
"[facility]  Endocrinology  Progress Note    Admit Date: 2019     Reason for Consult: Management of T2DM, Hyperglycemia, DKA    Surgical Procedure and Date: 19    Diabetes diagnosis year: >20 years, 1990s?    Home Diabetes Medications:  Metformin 500mg BID, Lantus 26U QHS    How often checking glucose at home? None  BG readings on regimen: n/a  Hypoglycemia on the regimen?  No  Missed doses on regimen?  No    Diabetes Complications include:     Hyperglycemia and Foot ulcer      Complicating diabetes co morbidities:  HTN      HPI:   Patient is a 49 y.o. male with a diagnosis of Type 2 DM (noncomplaince, skip Levemir 1-2x a week), HTN, PVD, hx of right foot osteomyelitis who was admitted to hospital medicine for DKA and L foot ulcer. He report  4-5 days of nausea and vomiting. Vomiting is non-bloody. He has not been able to keep anything down. Pt hasn't been administering his insulin during this time due to the fact that he has not been able to keep anything down. He was found to have a large open wound on L foot, pt recently lost his insurance and has been unable to follow up with wound care. Xray showed new Osteomyelitis of L foot.  Pt report skipping his Lantus at home 1-2x a week, not on insulin with meal. Was on Trulicity but stopped due to lost of insurance. BHOB was 4.1, A1C >14, Anion gap of 21, glucose 349. Endocrine was consulted for "DKA", diabetes management.         Interval HPI:  Pt tolerating PO diet 100%, Switch to MDI.  Glucose level above goal for the past 24hr (200 - 440).  No hypoglycemias     Overnight events: Had glucose of 440.  Received correction insulin with improvement to 270's.     Eatin%  Nausea: No  Hypoglycemia and intervention: No  Fever: No  TPN and/or TF: No    BP (!) 162/92 (BP Location: Left arm, Patient Position: Lying)   Pulse 87   Temp 97.5 °F (36.4 °C) (Oral)   Resp 18   Ht 6' 1" (1.854 m)   Wt 84.4 kg (186 lb)   SpO2 (!) 94%   BMI " 24.54 kg/m²      Labs Reviewed and Include    Recent Labs   Lab 07/20/19  1938   *   CALCIUM 8.1*   ALBUMIN 2.1*   PROT 6.8      K 2.8*   CO2 32*   CL 94*   BUN 5*   CREATININE 1.3   ALKPHOS 101   ALT <5*   AST 12   BILITOT 0.2     Lab Results   Component Value Date    WBC 6.80 07/21/2019    HGB 10.7 (L) 07/21/2019    HCT 34.7 (L) 07/21/2019    MCV 91 07/21/2019     07/21/2019     No results for input(s): TSH, FREET4 in the last 168 hours.  Lab Results   Component Value Date    HGBA1C >14.0 (H) 07/12/2019       Nutritional status:   Body mass index is 24.54 kg/m².  Lab Results   Component Value Date    ALBUMIN 2.1 (L) 07/20/2019    ALBUMIN 2.1 (L) 07/20/2019    ALBUMIN 2.1 (L) 07/19/2019     No results found for: PREALBUMIN    Estimated Creatinine Clearance: 76 mL/min (based on SCr of 1.3 mg/dL).    Accu-Checks  Recent Labs     07/18/19  2207 07/19/19  0801 07/19/19  1109 07/19/19  1522 07/19/19  2204 07/20/19  0724 07/20/19  1120 07/20/19  1655 07/20/19  2145 07/21/19  0756   POCTGLUCOSE 156* 157* 173* 154* 208* 213* 213* 223* 271* 212*       Current Medications and/or Treatments Impacting Glycemic Control  Immunotherapy:    Immunosuppressants     None        Steroids:   Hormones (From admission, onward)    None        Pressors:    Autonomic Drugs (From admission, onward)    None        Hyperglycemia/Diabetes Medications:   Antihyperglycemics (From admission, onward)    Start     Stop Route Frequency Ordered    07/21/19 2100  insulin detemir U-100 pen 15 Units      -- SubQ Nightly 07/21/19 0918    07/21/19 0715  insulin aspart U-100 pen 6 Units      -- SubQ 3 times daily with meals 07/20/19 2152 07/20/19 2250  insulin aspart U-100 pen 0-5 Units      -- SubQ Before meals & nightly PRN 07/20/19 2152 07/20/19 1145  insulin regular (Humulin R) 100 Units in sodium chloride 0.9% 100 mL infusion      07/20 2300 IV Continuous 07/20/19 1035          ASSESSMENT and PLAN    Type 2 diabetes mellitus  with hyperglycemia, with long-term current use of insulin  - DKA resolved   -Glucose levels for the past 24hrs ranging between (200-400's) ABOVE GOAL   -Glucose level goal inpatient (140 -180)   -Pt was switch to MDI (Levemir 12 units and Novolog 4 units pre-meals) yesterday 7/20/19    Plan:   -Increase total insulin daily requirements by 20%  -Increase Detemir to 15 units qHS   -Increase pre-meal Novolog to 6-8 unit SC qAC   -C/w low dose correction insulin qAC and HS   -Accuchecks qAC and HS     Discharge Recommendations: TBD    Acute osteomyelitis  - New L foot OM  - management per primary and Podiatry  - On IV Abx  -Tight glucose control (Goal 140-180)       Diabetic ketoacidosis without coma associated with type 2 diabetes mellitus  - DKA on admission due to noncompliance of insulin use combine with Osteomyelitis of foot  - Now resolved  -Refer to plan above         Nakul Patel MD  Endocrinology  [facility]

## 2019-07-21 NOTE — PLAN OF CARE
Problem: Adult Inpatient Plan of Care  Goal: Plan of Care Review     07/21/19 0418   Plan of Care Review   Plan of Care Reviewed With patient   Progress no change     POC reviewed. No acute events overnight. VS stable. BG monitored coverage per sliding scale. Insulin drip D/C per orders. IVF infusing @ 75ml/Hr. Wound vac in place on left foot. Dressing CDI. Ancef infusing continuously. Possible D/C home Monday. Safety maintained. WCTM.

## 2019-07-21 NOTE — SUBJECTIVE & OBJECTIVE
"Interval HPI:  Pt tolerating PO diet 100%, Switch to MDI.  Glucose level above goal for the past 24hr (200 - 440).  No hypoglycemias     Overnight events: Had glucose of 440.  Received correction insulin with improvement to 270's.     Eatin%  Nausea: No  Hypoglycemia and intervention: No  Fever: No  TPN and/or TF: No    BP (!) 162/92 (BP Location: Left arm, Patient Position: Lying)   Pulse 87   Temp 97.5 °F (36.4 °C) (Oral)   Resp 18   Ht 6' 1" (1.854 m)   Wt 84.4 kg (186 lb)   SpO2 (!) 94%   BMI 24.54 kg/m²     Labs Reviewed and Include    Recent Labs   Lab 19  1938   *   CALCIUM 8.1*   ALBUMIN 2.1*   PROT 6.8      K 2.8*   CO2 32*   CL 94*   BUN 5*   CREATININE 1.3   ALKPHOS 101   ALT <5*   AST 12   BILITOT 0.2     Lab Results   Component Value Date    WBC 6.80 2019    HGB 10.7 (L) 2019    HCT 34.7 (L) 2019    MCV 91 2019     2019     No results for input(s): TSH, FREET4 in the last 168 hours.  Lab Results   Component Value Date    HGBA1C >14.0 (H) 2019       Nutritional status:   Body mass index is 24.54 kg/m².  Lab Results   Component Value Date    ALBUMIN 2.1 (L) 2019    ALBUMIN 2.1 (L) 2019    ALBUMIN 2.1 (L) 2019     No results found for: PREALBUMIN    Estimated Creatinine Clearance: 76 mL/min (based on SCr of 1.3 mg/dL).    Accu-Checks  Recent Labs     19  2207 19  0801 19  1109 19  1522 19  2204 19  0724 19  1120 19  1655 19  2145 19  0756   POCTGLUCOSE 156* 157* 173* 154* 208* 213* 213* 223* 271* 212*       Current Medications and/or Treatments Impacting Glycemic Control  Immunotherapy:    Immunosuppressants     None        Steroids:   Hormones (From admission, onward)    None        Pressors:    Autonomic Drugs (From admission, onward)    None        Hyperglycemia/Diabetes Medications:   Antihyperglycemics (From admission, onward)    Start     Stop " Route Frequency Ordered    07/21/19 2100  insulin detemir U-100 pen 15 Units      -- SubQ Nightly 07/21/19 0918    07/21/19 0715  insulin aspart U-100 pen 6 Units      -- SubQ 3 times daily with meals 07/20/19 2152 07/20/19 2250  insulin aspart U-100 pen 0-5 Units      -- SubQ Before meals & nightly PRN 07/20/19 2152 07/20/19 1145  insulin regular (Humulin R) 100 Units in sodium chloride 0.9% 100 mL infusion      07/20 2300 IV Continuous 07/20/19 1035

## 2019-07-22 PROBLEM — E43 SEVERE MALNUTRITION: Status: ACTIVE | Noted: 2019-07-22

## 2019-07-22 LAB
ALBUMIN SERPL BCP-MCNC: 2.2 G/DL (ref 3.5–5.2)
ALBUMIN SERPL BCP-MCNC: 2.4 G/DL (ref 3.5–5.2)
ALP SERPL-CCNC: 111 U/L (ref 55–135)
ALP SERPL-CCNC: 112 U/L (ref 55–135)
ALT SERPL W/O P-5'-P-CCNC: <5 U/L (ref 10–44)
ALT SERPL W/O P-5'-P-CCNC: <5 U/L (ref 10–44)
ANION GAP SERPL CALC-SCNC: 8 MMOL/L (ref 8–16)
ANION GAP SERPL CALC-SCNC: 8 MMOL/L (ref 8–16)
AST SERPL-CCNC: 12 U/L (ref 10–40)
AST SERPL-CCNC: 15 U/L (ref 10–40)
BASOPHILS # BLD AUTO: 0.03 K/UL (ref 0–0.2)
BASOPHILS NFR BLD: 0.4 % (ref 0–1.9)
BILIRUB SERPL-MCNC: 0.2 MG/DL (ref 0.1–1)
BILIRUB SERPL-MCNC: 0.2 MG/DL (ref 0.1–1)
BUN SERPL-MCNC: 7 MG/DL (ref 6–20)
BUN SERPL-MCNC: 9 MG/DL (ref 6–20)
CALCIUM SERPL-MCNC: 8.4 MG/DL (ref 8.7–10.5)
CALCIUM SERPL-MCNC: 9.1 MG/DL (ref 8.7–10.5)
CHLORIDE SERPL-SCNC: 98 MMOL/L (ref 95–110)
CHLORIDE SERPL-SCNC: 99 MMOL/L (ref 95–110)
CO2 SERPL-SCNC: 33 MMOL/L (ref 23–29)
CO2 SERPL-SCNC: 37 MMOL/L (ref 23–29)
CREAT SERPL-MCNC: 1 MG/DL (ref 0.5–1.4)
CREAT SERPL-MCNC: 1.1 MG/DL (ref 0.5–1.4)
DIFFERENTIAL METHOD: ABNORMAL
EOSINOPHIL # BLD AUTO: 0.1 K/UL (ref 0–0.5)
EOSINOPHIL NFR BLD: 1.1 % (ref 0–8)
ERYTHROCYTE [DISTWIDTH] IN BLOOD BY AUTOMATED COUNT: 12.8 % (ref 11.5–14.5)
EST. GFR  (AFRICAN AMERICAN): >60 ML/MIN/1.73 M^2
EST. GFR  (AFRICAN AMERICAN): >60 ML/MIN/1.73 M^2
EST. GFR  (NON AFRICAN AMERICAN): >60 ML/MIN/1.73 M^2
EST. GFR  (NON AFRICAN AMERICAN): >60 ML/MIN/1.73 M^2
GLUCOSE SERPL-MCNC: 144 MG/DL (ref 70–110)
GLUCOSE SERPL-MCNC: 290 MG/DL (ref 70–110)
HCT VFR BLD AUTO: 33.8 % (ref 40–54)
HGB BLD-MCNC: 10.5 G/DL (ref 14–18)
IMM GRANULOCYTES # BLD AUTO: 0.02 K/UL (ref 0–0.04)
IMM GRANULOCYTES NFR BLD AUTO: 0.3 % (ref 0–0.5)
LYMPHOCYTES # BLD AUTO: 2.1 K/UL (ref 1–4.8)
LYMPHOCYTES NFR BLD: 28.7 % (ref 18–48)
MAGNESIUM SERPL-MCNC: 1.5 MG/DL (ref 1.6–2.6)
MCH RBC QN AUTO: 27.6 PG (ref 27–31)
MCHC RBC AUTO-ENTMCNC: 31.1 G/DL (ref 32–36)
MCV RBC AUTO: 89 FL (ref 82–98)
MONOCYTES # BLD AUTO: 0.6 K/UL (ref 0.3–1)
MONOCYTES NFR BLD: 8.4 % (ref 4–15)
NEUTROPHILS # BLD AUTO: 4.4 K/UL (ref 1.8–7.7)
NEUTROPHILS NFR BLD: 61.1 % (ref 38–73)
NRBC BLD-RTO: 0 /100 WBC
PHOSPHATE SERPL-MCNC: 2.1 MG/DL (ref 2.7–4.5)
PLATELET # BLD AUTO: 339 K/UL (ref 150–350)
PMV BLD AUTO: 10.2 FL (ref 9.2–12.9)
POCT GLUCOSE: 165 MG/DL (ref 70–110)
POCT GLUCOSE: 205 MG/DL (ref 70–110)
POCT GLUCOSE: 279 MG/DL (ref 70–110)
POTASSIUM SERPL-SCNC: 3.3 MMOL/L (ref 3.5–5.1)
POTASSIUM SERPL-SCNC: 3.4 MMOL/L (ref 3.5–5.1)
PROT SERPL-MCNC: 6.8 G/DL (ref 6–8.4)
PROT SERPL-MCNC: 7.1 G/DL (ref 6–8.4)
RBC # BLD AUTO: 3.81 M/UL (ref 4.6–6.2)
SODIUM SERPL-SCNC: 140 MMOL/L (ref 136–145)
SODIUM SERPL-SCNC: 143 MMOL/L (ref 136–145)
WBC # BLD AUTO: 7.17 K/UL (ref 3.9–12.7)

## 2019-07-22 PROCEDURE — 84100 ASSAY OF PHOSPHORUS: CPT

## 2019-07-22 PROCEDURE — 99232 PR SUBSEQUENT HOSPITAL CARE,LEVL II: ICD-10-PCS | Mod: ,,, | Performed by: INTERNAL MEDICINE

## 2019-07-22 PROCEDURE — 99024 PR POST-OP FOLLOW-UP VISIT: ICD-10-PCS | Mod: ,,, | Performed by: PODIATRIST

## 2019-07-22 PROCEDURE — 20600001 HC STEP DOWN PRIVATE ROOM

## 2019-07-22 PROCEDURE — 80053 COMPREHEN METABOLIC PANEL: CPT

## 2019-07-22 PROCEDURE — 63600175 PHARM REV CODE 636 W HCPCS: Performed by: HOSPITALIST

## 2019-07-22 PROCEDURE — 36415 COLL VENOUS BLD VENIPUNCTURE: CPT

## 2019-07-22 PROCEDURE — 85025 COMPLETE CBC W/AUTO DIFF WBC: CPT

## 2019-07-22 PROCEDURE — 63600175 PHARM REV CODE 636 W HCPCS: Performed by: GENERAL ACUTE CARE HOSPITAL

## 2019-07-22 PROCEDURE — 99232 SBSQ HOSP IP/OBS MODERATE 35: CPT | Mod: ,,, | Performed by: INTERNAL MEDICINE

## 2019-07-22 PROCEDURE — 25000003 PHARM REV CODE 250: Performed by: PHYSICIAN ASSISTANT

## 2019-07-22 PROCEDURE — 25000003 PHARM REV CODE 250: Performed by: HOSPITALIST

## 2019-07-22 PROCEDURE — 99024 POSTOP FOLLOW-UP VISIT: CPT | Mod: ,,, | Performed by: PODIATRIST

## 2019-07-22 PROCEDURE — 25000003 PHARM REV CODE 250: Performed by: NURSE PRACTITIONER

## 2019-07-22 PROCEDURE — 63600175 PHARM REV CODE 636 W HCPCS: Performed by: PHYSICIAN ASSISTANT

## 2019-07-22 PROCEDURE — 83735 ASSAY OF MAGNESIUM: CPT

## 2019-07-22 RX ADMIN — INSULIN ASPART 6 UNITS: 100 INJECTION, SOLUTION INTRAVENOUS; SUBCUTANEOUS at 08:07

## 2019-07-22 RX ADMIN — HYDRALAZINE HYDROCHLORIDE 25 MG: 25 TABLET, FILM COATED ORAL at 12:07

## 2019-07-22 RX ADMIN — AMLODIPINE BESYLATE 5 MG: 5 TABLET ORAL at 08:07

## 2019-07-22 RX ADMIN — POTASSIUM CHLORIDE 50 MEQ: 750 CAPSULE, EXTENDED RELEASE ORAL at 01:07

## 2019-07-22 RX ADMIN — CEFAZOLIN 6 G: 1 INJECTION, POWDER, FOR SOLUTION INTRAMUSCULAR; INTRAVENOUS at 12:07

## 2019-07-22 RX ADMIN — INSULIN ASPART 1 UNITS: 100 INJECTION, SOLUTION INTRAVENOUS; SUBCUTANEOUS at 11:07

## 2019-07-22 RX ADMIN — INSULIN ASPART 3 UNITS: 100 INJECTION, SOLUTION INTRAVENOUS; SUBCUTANEOUS at 08:07

## 2019-07-22 RX ADMIN — ATORVASTATIN CALCIUM 80 MG: 20 TABLET, FILM COATED ORAL at 09:07

## 2019-07-22 RX ADMIN — INSULIN ASPART 6 UNITS: 100 INJECTION, SOLUTION INTRAVENOUS; SUBCUTANEOUS at 05:07

## 2019-07-22 RX ADMIN — ENOXAPARIN SODIUM 40 MG: 100 INJECTION SUBCUTANEOUS at 05:07

## 2019-07-22 RX ADMIN — INSULIN ASPART 6 UNITS: 100 INJECTION, SOLUTION INTRAVENOUS; SUBCUTANEOUS at 11:07

## 2019-07-22 NOTE — PLAN OF CARE
Problem: Adult Inpatient Plan of Care  Goal: Plan of Care Review     07/22/19 0432   Plan of Care Review   Plan of Care Reviewed With patient   Progress no change     POC reviewed. VSS. No acute events overnight. BG elevated, coverage given. Critical potassium level 2.7, contacted [facility] spoke w/ Nichole Toth NP, potassium given PO per orders. Safety maintained. Will continue to monitor.

## 2019-07-22 NOTE — ASSESSMENT & PLAN NOTE
DKA is now resolved. His Bgs have been out of goal 200-290. There was one episode of 96 yesterday at noon, he reports he is unsure about his breakfast intake and it was not charted. His insulin Levemir was increased to 15U HS yesterday. Given persistent hyperglycemia will adjust regimen as detailed below.     Plan:   - Increase Detemir to 18 units qHS   - Continue pre-meal Novolog to 6 unit SC qAC   - Continue low dose correction insulin qAC and HS   - Accuchecks qAC and HS     Discharge Recommendations: If discharged please discharge on insulin regimen as above and follow up with PCP

## 2019-07-22 NOTE — PLAN OF CARE
07/22/19 1121   Post-Acute Status   Post-Acute Authorization Placement   Other Status See Comments     SW followed up with patient nurse and she confirmed that patient wound vac has arrived at the bedside. Patient is awaiting to get a PICC Line and SW is awaiting a call back from the home health agency. ALIYAH will continue to follow up.    Josefina Hammond LMSW  [facility]   [id number]

## 2019-07-22 NOTE — PROGRESS NOTES
"[facility]  Endocrinology  Progress Note    Admit Date: 2019     Reason for Consult: Management of T2DM, Hyperglycemia, DKA    Surgical Procedure and Date: 19    Diabetes diagnosis year: >20 years, 1990s?    Home Diabetes Medications:  Metformin 500mg BID, Lantus 26U QHS    How often checking glucose at home? None  BG readings on regimen: n/a  Hypoglycemia on the regimen?  No  Missed doses on regimen?  No    Diabetes Complications include:     Hyperglycemia and Foot ulcer      Complicating diabetes co morbidities:  HTN      HPI:   Patient is a 49 y.o. male with a diagnosis of Type 2 DM (noncomplaince, skip Levemir 1-2x a week), HTN, PVD, hx of right foot osteomyelitis who was admitted to hospital medicine for DKA and L foot ulcer. He report  4-5 days of nausea and vomiting. Vomiting is non-bloody. He has not been able to keep anything down. Pt hasn't been administering his insulin during this time due to the fact that he has not been able to keep anything down. He was found to have a large open wound on L foot, pt recently lost his insurance and has been unable to follow up with wound care. Xray showed new Osteomyelitis of L foot.  Pt reported skipping his Lantus at home 1-2x a week, not on insulins with meal. Was on Trulicity but stopped due to lost of insurance. BHB was 4.1, A1C >14, Anion gap of 21, glucose 349. Endocrine was consulted for DKA, diabetes management.     Interval HPI: Patient is now tolerating 75% of meals and has remained hyperglycemic. He wants to go home.  Overnight events: None  Eatin-100%  Nausea: No  Hypoglycemia and intervention: No  Fever: No  TPN and/or TF: No    BP (!) 139/103 (BP Location: Right arm, Patient Position: Lying)   Pulse 100   Temp 97.4 °F (36.3 °C) (Oral)   Resp 18   Ht 6' 1" (1.854 m)   Wt 84.4 kg (186 lb)   SpO2 99%   BMI 24.54 kg/m²      Labs Reviewed and Include    Recent Labs   Lab 19  0810   *   CALCIUM 8.4* "   ALBUMIN 2.2*   PROT 6.8      K 3.4*   CO2 33*   CL 99   BUN 7   CREATININE 1.1   ALKPHOS 111   ALT <5*   AST 12   BILITOT 0.2     Lab Results   Component Value Date    WBC 7.17 07/22/2019    HGB 10.5 (L) 07/22/2019    HCT 33.8 (L) 07/22/2019    MCV 89 07/22/2019     07/22/2019     No results for input(s): TSH, FREET4 in the last 168 hours.  Lab Results   Component Value Date    HGBA1C >14.0 (H) 07/12/2019       Nutritional status:   Body mass index is 24.54 kg/m².  Lab Results   Component Value Date    ALBUMIN 2.2 (L) 07/22/2019    ALBUMIN 2.2 (L) 07/21/2019    ALBUMIN 2.4 (L) 07/21/2019     No results found for: PREALBUMIN    Estimated Creatinine Clearance: 89.8 mL/min (based on SCr of 1.1 mg/dL).    Accu-Checks  Recent Labs     07/20/19  0724 07/20/19  1120 07/20/19  1655 07/20/19  2145 07/21/19  0756 07/21/19  1140 07/21/19  1645 07/21/19  2128 07/22/19  0756 07/22/19  1142   POCTGLUCOSE 213* 213* 223* 271* 212* 96 228* 239* 279* 205*       Current Medications and/or Treatments Impacting Glycemic Control  Immunotherapy:    Immunosuppressants     None        Steroids:   Hormones (From admission, onward)    None        Pressors:    Autonomic Drugs (From admission, onward)    None        Hyperglycemia/Diabetes Medications:   Antihyperglycemics (From admission, onward)    Start     Stop Route Frequency Ordered    07/21/19 2100  insulin detemir U-100 pen 15 Units      -- SubQ Nightly 07/21/19 0918    07/21/19 1645  insulin aspart U-100 pen 6 Units      -- SubQ 3 times daily with meals 07/21/19 1200    07/20/19 2250  insulin aspart U-100 pen 0-5 Units      -- SubQ Before meals & nightly PRN 07/20/19 2152    07/20/19 1145  insulin regular (Humulin R) 100 Units in sodium chloride 0.9% 100 mL infusion      07/20 2300 IV Continuous 07/20/19 1035          ASSESSMENT and PLAN    Type 2 diabetes mellitus with hyperglycemia, with long-term current use of insulin  DKA is now resolved. His Bgs have been out of  goal 200-290. There was one episode of 96 yesterday at noon, he reports he is unsure about his breakfast intake and it was not charted. His insulin Levemir was increased to 15U HS yesterday. Given persistent hyperglycemia will adjust regimen as detailed below.     Plan:   - Increase Detemir to 18 units qHS   - Continue pre-meal Novolog to 6 unit SC qAC   - Continue low dose correction insulin qAC and HS   - Accuchecks qAC and HS     Discharge Recommendations: If discharged please discharge on insulin regimen as above and follow up with PCP    Acute osteomyelitis  Management per primary and Podiatry  On IV cefazolin  Glucose control as above        Javid Saini MD  Endocrinology  [facility]

## 2019-07-22 NOTE — PLAN OF CARE
[facility]    HOME HEALTH ORDERS  FACE TO FACE ENCOUNTER    Patient Name: Indio Ryder Jr.  YOB: 1970    PCP: Lorena Thakur MD   PCP Address: [address] / [place] [address]  PCP Phone Number: [phone]  PCP Fax: [phone]    Encounter Date: 07/22/2019    Admit to Home Health    Diagnoses:  Active Hospital Problems    Diagnosis  POA    *Bacteremia due to group B Streptococcus [R78.81]  Yes    Left foot infection [L08.9]  Yes    Limb pain [M79.609]  Unknown    Sepsis [A41.9]  Yes    Acute osteomyelitis [M86.10]  Yes    Leukocytosis [D72.829]  Unknown    Anemia [D64.9]  Unknown    Foot ulcer [L97.509]  Yes    Neck pain [M54.2]  Yes    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       No future appointments.        I have seen and examined this patient face to face today. My clinical findings that support the need for the home health skilled services and home bound status are the following:  Weakness/numbness causing balance and gait disturbance due to Infection making it taxing to leave home.    Allergies:Review of patient's allergies indicates:  No Known Allergies    Diet: diabetic diet: 2200 calorie    Activities: activity as tolerated    Nursing:   SN to complete comprehensive assessment including routine vital signs. Instruct on disease process and s/s of complications to report to MD. Review/verify medication list sent home with the patient at time of  discharge  and instruct patient/caregiver as needed. Frequency may be adjusted depending on start of care date.    Notify MD if SBP > 160 or < 90; DBP > 90 or < 50; HR > 120 or < 50; Temp > 101; Other:         MISCELLANEOUS CARE:  Home Infusion Therapy:   SN to perform Infusion Therapy/Central Line Care.  Review Central Line Care & Central Line Flush with patient.    Administer (drug and dose): Cefazolin 6gm , continuous infusion  Last dose given:7/22/19                         Home dose due: 7/22/19    End Date 7/30/19    Weekly cbc, cmp, crp, esr and fax results to ID at [phone].    Scrub the Hub: Prior to accessing the line, always perform a 30 second alcohol scrub  Each lumen of the central line is to be flushed at least daily with 10 mL Normal Saline and 3 mL Heparin flush (10 units/mL)  Skilled Nurse (SN) may draw blood from IV access  Blood Draw Procedure:   - Aspirate at least 5 mL of blood   - Discard   - Obtain specimen   - Change injection cap   - Flush with 20 mL Normal Saline followed by a                 3-5 mL Heparin flush (10 units/mL)  Central :   - Sterile dressing changes are done weekly and as needed.   - Use chlor-hexadine scrub to cleanse site, apply Biopatch to insertion site,       apply securement device dressing   - Injection caps are changed weekly and after EVERY lab draw.   - If sterile gauze is under dressing to control oozing,                 dressing change must be performed every 24 hours until gauze is not needed.    WOUND CARE ORDERS  yes:  Wound Vac: 125 mmHg continuous pressure Left foot.   Location:  Foot        Dressing changes every Monday, Wednesday and Friday.        ET Consult        Medications: Review discharge medications with patient and family and provide education.      Current Discharge Medication List      START taking these medications    Details   dextrose 5 % SolP 500 mL with ceFAZolin 1 gram SolR 6 g Inject 6 g into the vein continuous. for  "9 days         CONTINUE these medications which have NOT CHANGED    Details   metFORMIN (GLUCOPHAGE) 500 MG tablet Take 500 mg by mouth 2 (two) times daily with meals.      ONETOUCH DELICA LANCETS 33 gauge Misc       ONETOUCH ULTRA2 kit          STOP taking these medications       aspirin (ECOTRIN) 81 MG EC tablet Comments:   Reason for Stopping:         atorvastatin (LIPITOR) 80 MG tablet Comments:   Reason for Stopping:         BD ULTRA-FINE SASCHA PEN NEEDLES 32 gauge x 5/32" Ndle Comments:   Reason for Stopping:         blood sugar diagnostic Strp Comments:   Reason for Stopping:         dulaglutide (TRULICITY) 0.75 mg/0.5 mL PnIj Comments:   Reason for Stopping:         insulin glargine (LANTUS SOLOSTAR) 100 unit/mL (3 mL) InPn pen Comments:   Reason for Stopping:         lisinopril 10 MG tablet Comments:   Reason for Stopping:         tizanidine (ZANAFLEX) 4 MG tablet Comments:   Reason for Stopping:               I certify that this patient is confined to his home and needs intermittent skilled nursing care.      "

## 2019-07-22 NOTE — PROGRESS NOTES
"  [facility]  Adult Nutrition  Consult Note    SUMMARY     Recommendations    1. Change current 1500 kcal ADA diet to 2000 kcal ADA diet.   2. RD to monitor & follow-up.    Goals: PO intake >50%  Nutrition Goal Status: new  Communication of RD Recs: reviewed with RN    Reason for Assessment    Reason For Assessment: RD follow-up  Diagnosis: other (see comments)(Bactermeia)  Relevant Medical History: UC, DM  Interdisciplinary Rounds: did not attend    General Information Comments: Pt educated on diabetic diet , no further questions at this time. Pt currently on diabetic diet, consuming % of meals. PTA, pt reports poor appetite x 1 month & UBW of 210 x 6 months (-12%). Pt doesnt want ONS. NFPE complete, pt noted w/ severe malnutrition. Please see PES statement for details.  Nutrition Discharge Planning: Adequate PO intake    Nutrition/Diet History    Patient Reported Diet/Restrictions/Preferences: diabetic diet, general  Spiritual, Cultural Beliefs, Catholic Practices, Values that Affect Care: no  Factors Affecting Nutritional Intake: decreased appetite    Anthropometrics    Temp: 97.4 °F (36.3 °C)  Height Method: Stated  Height: 6' 1" (185.4 cm)  Height (inches): 73 in  Weight Method: Bed Scale  Weight: 84.4 kg (186 lb 1.1 oz)  Weight (lb): 186.07 lb  Ideal Body Weight (IBW), Male: 184 lb  % Ideal Body Weight, Male (lb): 101.12 lb  BMI (Calculated): 24.6  BMI Grade: 18.5-24.9 - normal  Usual Body Weight (UBW), k.5 kg  % Usual Body Weight: 88.56  % Weight Change From Usual Weight: -11.62 %    Lab/Procedures/Meds    Pertinent Labs Reviewed: reviewed  Pertinent Labs Comments: Gluc 157, A1C >14  Pertinent Medications Reviewed: reviewed  Pertinent Medications Comments: IVF, Statin    Estimated/Assessed Needs    Weight Used For Calorie Calculations: 84.4 kg (186 lb 1.1 oz)     Energy Calorie Requirements (kcal): 2190 kcal/d  Energy Need Method: [place]-St Jeor(1.25 PAL)     Protein " Requirements: 101 g/d (1.2 g/kg)  Weight Used For Protein Calculations: 84.4 kg (186 lb 1.1 oz)     Estimated Fluid Requirement Method: other (see comments)(Per MD or 1 mL/kcal)    Nutrition Prescription Ordered    Current Diet Order: 1500 kcal ADA    Evaluation of Received Nutrient/Fluid Intake    Comments: LBM: 7/21    Tolerance: tolerating    Nutrition Risk    Level of Risk/Frequency of Follow-up: (1x/week)     Assessment and Plan    Severe malnutrition    Malnutrition in the context of Chronic Illness/Injury    Related to (etiology):  Inadequate energy intake    Signs and Symptoms (as evidenced by):  Energy Intake: <75% of estimated energy requirement for 1 month  Body Fat Depletion: severe depletion of orbitals and triceps   Muscle Mass Depletion: severe depletion of temples, clavicle region, scapular region and lower extremities   Weight Loss: 12% x 6 months    Interventions/Recommendations (treatment strategy):  Collaboration of nutrition care w/ other providers     Nutrition Diagnosis Status:  New     Monitor and Evaluation    Food and Nutrient Intake: energy intake, food and beverage intake  Food and Nutrient Adminstration: diet order  Physical Activity and Function: nutrition-related ADLs and IADLs  Anthropometric Measurements: weight, weight change  Biochemical Data, Medical Tests and Procedures: glucose/endocrine profile, inflammatory profile, lipid profile, gastrointestinal profile, electrolyte and renal panel  Nutrition-Focused Physical Findings: overall appearance     Malnutrition Assessment    Weight Loss (Malnutrition): greater than 10% in 6 months  Energy Intake (Malnutrition): less than 75% for greater than or equal to 1 month  Subcutaneous Fat (Malnutrition): severe depletion  Muscle Mass (Malnutrition): severe depletion   Orbital Region (Subcutaneous Fat Loss): severe depletion  Upper Arm Region (Subcutaneous Fat Loss): severe depletion   [place] Region (Muscle Loss): severe depletion  Clavicle  Bone Region (Muscle Loss): severe depletion  Posterior Calf Region (Muscle Loss): severe depletion     Nutrition Follow-Up    RD Follow-up?: Yes

## 2019-07-22 NOTE — PROGRESS NOTES
[facility]  Podiatry  Progress Note    Patient Name: Indio Ryder Jr.  MRN: [id number]  Admission Date: 7/12/2019  Hospital Length of Stay: 10 days  Attending Physician: Luis Rust MD  Primary Care Provider: Lorena Thakur MD     Subjective:     Interval History:   Patient seen at bedside s/p left foot partial 5th ray amputation with irrigation and debridement and application of graft. No acute distress. Denies any pedal pain. Denies calf pain. No new complaints. Dressings to left foot clean, dry, intact. Wound vac to left foot wound intact running @ 125 mmHg continuous pressure. Admits to nausea since ED admission, no vomiting, fever, chills.     7/20 Patient Seen at bedside for dressing change.  Wound vac intact.  Patient denies F/C/N/V.    7/22 Patient Seen at bedside for dressing change.  Wound vac intact.  Patient denies F/C/N/V.    Follow-up For: Procedure(s) (LRB):  DEBRIDEMENT, FOOT with leftt 5th ray partial amputation with Neox Graft (Left)    Post-Operative Day: 1 Day Post-Op    Scheduled Meds:   amLODIPine  5 mg Oral Daily    atorvastatin  80 mg Oral Daily    ceFAZolin(ANCEF) in D5W 500 mL CONTINUOUS INFUSION  6 g Intravenous Q24H    enoxaparin  40 mg Subcutaneous Daily    insulin aspart U-100  6 Units Subcutaneous TIDWM    insulin detemir U-100  15 Units Subcutaneous QHS     Continuous Infusions:   sodium chloride 0.9% 75 mL/hr at 07/21/19 0613     PRN Meds:acetaminophen, calcium carbonate, Dextrose 10% Bolus, Dextrose 10% Bolus, glucagon (human recombinant), glucose, glucose, hydrALAZINE, insulin aspart U-100, ondansetron, promethazine (PHENERGAN) IVPB, sodium chloride 0.9%, sodium chloride 0.9%    Review of Systems   Constitutional: Negative for appetite change, chills and fever.   Eyes: Negative for visual disturbance.   Respiratory: Negative for cough and shortness of breath.    Cardiovascular: Negative for chest pain and leg swelling.   Gastrointestinal: Negative  for abdominal pain, constipation, diarrhea, nausea and vomiting.   Genitourinary: Negative for dysuria, frequency and hematuria.   Musculoskeletal: Negative for back pain and myalgias.   Skin: Positive for wound (left foot surgical wound; right foot plantar ulcer). Negative for color change and rash.   Neurological: Negative for dizziness, light-headedness and headaches.   Psychiatric/Behavioral: Negative for agitation and behavioral problems. The patient is not nervous/anxious.      Objective:     Vital Signs (Most Recent):  Temp: 97.7 °F (36.5 °C) (07/22/19 0335)  Pulse: 92 (07/22/19 0335)  Resp: 18 (07/22/19 0335)  BP: (!) 152/92 (07/22/19 0335)  SpO2: 95 % (07/22/19 0335) Vital Signs (24h Range):  Temp:  [97.7 °F (36.5 °C)-98.6 °F (37 °C)] 97.7 °F (36.5 °C)  Pulse:  [] 92  Resp:  [18-20] 18  SpO2:  [92 %-99 %] 95 %  BP: (118-155)/(79-96) 152/92     Weight: 84.4 kg (186 lb)  Body mass index is 24.54 kg/m².    Foot Exam    General  Orientation: alert and oriented to person, place, and time       Right Foot/Ankle     Inspection and Palpation  Tenderness: none   Swelling: none   Skin Exam: ulcer;     Neurovascular  Dorsalis pedis: 1+  Posterior tibial: 1+  Saphenous nerve sensation: diminished  Tibial nerve sensation: diminished  Superficial peroneal nerve sensation: diminished  Deep peroneal nerve sensation: diminished  Sural nerve sensation: diminished      Left Foot/Ankle      Inspection and Palpation  Ecchymosis: none  Tenderness: none   Swelling: none   Skin Exam: drainage, skin changes and ulcer; (surgical wound 2/2 partial 5th ray amp with irrigation and debridement; proximal plantar sutures remain intact; serosanguinous drainage noted in wound vac cannister; no edema, no erythema, no signs of infection noted)    Neurovascular  Dorsalis pedis: 1+  Posterior tibial: 1+  Saphenous nerve sensation: diminished  Tibial nerve sensation: diminished  Superficial peroneal nerve sensation: diminished  Deep  peroneal nerve sensation: diminished  Sural nerve sensation: diminished            Laboratory:  A1C:   Recent Labs   Lab 07/12/19  1023   HGBA1C >14.0*     CBC:   Recent Labs   Lab 07/22/19  0416   WBC 7.17   RBC 3.81*   HGB 10.5*   HCT 33.8*      MCV 89   MCH 27.6   MCHC 31.1*     CRP:   No results for input(s): CRP in the last 168 hours.  ESR:   No results for input(s): SEDRATE in the last 168 hours.  Wound Cultures:   Recent Labs   Lab 07/12/19  1257 07/14/19  1728 07/17/19 1214   LABAERO STREPTOCOCCUS AGALACTIAE (GROUP B)  Few  Beta-hemolytic streptococci are routinely susceptible to   penicillins,cephalosporins and carbapenems.  Susceptibility testing not routinely performed  *  STAPHYLOCOCCUS AUREUS  Few  * STREPTOCOCCUS AGALACTIAE (GROUP B)  Rare  Beta-hemolytic streptococci are routinely susceptible to   penicillins,cephalosporins and carbapenems.  Susceptibility testing not routinely performed  Skin janes also present  * Skin janes,  no predominant organism     Microbiology Results (last 7 days)     Procedure Component Value Units Date/Time    Culture, Anaerobe [[id number]] Collected:  07/17/19 1214    Order Status:  Completed Specimen:  Wound from Foot, Left Updated:  07/22/19 1054     Anaerobic Culture Culture in progress    Narrative:       Left foot wound    Blood culture [[id number]] Collected:  07/15/19 0305    Order Status:  Completed Specimen:  Blood from Antecubital, Right Updated:  07/20/19 0822     Blood Culture, Routine No growth after 5 days.    Blood culture [[id number]] Collected:  07/15/19 0305    Order Status:  Completed Specimen:  Blood from Antecubital, Right Updated:  07/20/19 0822     Blood Culture, Routine No growth after 5 days.    Aerobic culture [[id number]] Collected:  07/17/19 1214    Order Status:  Completed Specimen:  Wound from Foot, Left Updated:  07/19/19 1106     Aerobic Bacterial Culture Skin janes,  no predominant organism    Narrative:       Left foot wound    Blood  culture [[id number]] Collected:  07/14/19 0629    Order Status:  Completed Specimen:  Blood Updated:  07/19/19 1012     Blood Culture, Routine No growth after 5 days.    AFB Culture & Smear [[id number]] Collected:  07/17/19 1214    Order Status:  Completed Specimen:  Wound from Foot, Left Updated:  07/18/19 2127     AFB Culture & Smear Culture in progress     AFB CULTURE STAIN No acid fast bacilli seen.    Narrative:       Left foot wound    Culture, Anaerobe [[id number]]  (Abnormal) Collected:  07/14/19 1728    Order Status:  Completed Specimen:  Wound from Toe, Left Foot Updated:  07/18/19 1407     Anaerobic Culture PEPTONIPHILUS HAREI  Few      Narrative:       Left 5th metatarsal    Blood culture [[id number]] Collected:  07/13/19 0358    Order Status:  Completed Specimen:  Blood Updated:  07/18/19 0612     Blood Culture, Routine No growth after 5 days.    Blood culture [[id number]] Collected:  07/13/19 0358    Order Status:  Completed Specimen:  Blood Updated:  07/18/19 0612     Blood Culture, Routine No growth after 5 days.    Culture, Anaerobe [[id number]] Collected:  07/17/19 1659    Order Status:  Sent Specimen:  Tissue from Foot, Left Updated:  07/17/19 1659    Aerobic culture [[id number]] Collected:  07/17/19 1659    Order Status:  Sent Specimen:  Tissue from Foot, Left Updated:  07/17/19 1659    Fungus culture [[id number]] Collected:  07/17/19 1659    Order Status:  Sent Specimen:  Tissue from Foot, Left Updated:  07/17/19 1659    Gram stain [[id number]] Collected:  07/17/19 1659    Order Status:  Sent Specimen:  Tissue from Foot, Left Updated:  07/17/19 1659    AFB Culture & Smear [[id number]] Collected:  07/17/19 1659    Order Status:  Sent Specimen:  Tissue from Foot, Left Updated:  07/17/19 1659    Gram stain [[id number]] Collected:  07/17/19 1214    Order Status:  Completed Specimen:  Wound from Foot, Left Updated:  07/17/19 1403     Gram Stain Result No WBC's      No organisms seen    Narrative:       Left  foot wound    Fungus culture [[id number]] Collected:  07/17/19 1214    Order Status:  Sent Specimen:  Wound from Foot, Left Updated:  07/17/19 1228    Culture, Anaerobe [[id number]]  (Abnormal) Collected:  07/12/19 1257    Order Status:  Completed Specimen:  Wound from Foot, Left Updated:  07/16/19 1436     Anaerobic Culture FINEGOLDIA MAGNA  Many      Aerobic culture [[id number]]  (Abnormal) Collected:  07/14/19 1728    Order Status:  Completed Specimen:  Wound from Toe, Left Foot Updated:  07/16/19 0850     Aerobic Bacterial Culture STREPTOCOCCUS AGALACTIAE (GROUP B)  Rare  Beta-hemolytic streptococci are routinely susceptible to   penicillins,cephalosporins and carbapenems.  Susceptibility testing not routinely performed  Skin janes also present      Narrative:       Left 5th metatarsal        Specimen (12h ago, onward)    None          Diagnostic Results:  I have reviewed all pertinent imaging results/findings within the past 24 hours.    Clinical Findings:  Left foot with wound vac application intact; Running at 125 mmHg continuous pressure    Ulcer Location: left lateral plantar foot  Measurements:  Periwound: viable/slight maceration  Drainage: serosanguinous.  Base:  100% granular. 0% fibrin., wound graft intact  Signs of infection: None.     7/22              7/20            7/18      7/16      7/15 s/p I&D      7/12 post ED bedside debridement      7/12 pre ED bedside debridement          Assessment/Plan:     Anemia  Managed per hospital medicine    Leukocytosis  resolved    Acute osteomyelitis  Indio Ryder JrMarshall is a 49 y.o. male  S/P Debridement left foot wound (DOS: 7/14 per Dr. 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 Almonte).    - Wound vac changed today, will continue wound vac at 125 mmHg continuous pressure while inpatient. Podiatry will change wound vac while inpatient  -Abx per ID, appreciate recs  -  Non weight bearing left foot.  -recommend PT for gait training.    DC Instructions:  Patient is to follow up with podiatry within 10 days of discharge.  Call [phone]  to make an appointment.  Home health/facility to do wound vac dressing changes every  as follows:  Rinse with saline, pat dry, place adaptic over graft, apply wound vac, place vac on 125mmHg slow continuous.  Dress in cast padding and coban          Type 2 diabetes mellitus with left eye affected by mild nonproliferative retinopathy without macular edema, without long-term current use of insulin  Managed per hospital medicine    Diabetic ketoacidosis without coma associated with type 2 diabetes mellitus  Managed per hospital medicine & endocrinology     Mixed hyperlipidemia  Managed per hospital medicine    Essential hypertension  Per primary        Martin Parker MD  Podiatry  [facility]

## 2019-07-22 NOTE — PROGRESS NOTES
Progress Note  Hospital Medicine    Admit Date: 7/12/2019  Length of Stay:  LOS: 10 days     SUBJECTIVE:         Follow-up For:  Bacteremia due to group B Streptococcus    HPI/Interval history (See H&P for complete P,F,SHx) :   Over view  Indio Ryder Jr. is a 49 y.o. male with a PMH of T2DM (poorly  controlled, with long term insulin use) and HTN who presented to the ED on 7/12/2019 diagnosed with  Vomiting (vomiting for past 4 days, denies sick contacts)   Oriented x3 accompanied by daughter at the bedside.  Mentions that he wears lower extremity stockings for edema. Reportedly formed a new ulcer on the left lateral aspect of the foot about 10 days back.  had 4-5 days of nausea and vomiting. Vomiting is non-bloody, No abdominal pain..  It occurs immediately after eating and he has not been able to keep anything down. Associated with fevers and chills but no objective temperature measurement.  At baseline only takes insulin once a day stop taking more than a week because of nausea and vomiting and poor oral intake.  Does not check fingersticks regularly at home. recently lost his insurance and has been unable to follow up with wound care.  His last follow-up with primary care provider and podiatrist was about 2 years.  He has been managing his right foot ulcer at home with dressing but over the past 2 weeks an ulcer on his left foot has developed. He denies any pain or injury- has chronic numbness of bilateral lower extremities and hands from diabetic neuropathy.  Found to be in DKA in the emergency department with beta hydroxybutyrate elevated at 4.1.  Started on insulin drip    07/13/2019   Temperature of 101.5 last night.  Anion gap resolved- bicarbonate 23, insulin drip discontinued.  Started on Levemir 23 units q.a.m. 7 units as part t.i.d. with meals with low-dose sliding scale.  Diabetic diet and NPO from midnight for possible intervention.  MRI of the foot- Ulceration involving the lateral plantar  aspect of the distal left foot, with findings concerning for exposed bone involving the distal aspect of the 5th metatarsal and proximal phalange of the 5th toe.  There are associated changes concerning for osteomyelitis involving the distal aspect of the 5th metatarsal and entirety of the right toe.  There is osseous hyperemia involving the proximal phalange of the 2nd toe, early changes of osteomyelitis are a consideration . aerobic culture  and blood culture with Group B strep. discontinued vancomycin       07/14/2019  Had urinary retention of 800 mL but spontaneously voided.  Started on transitional insulin drip as NPO from midnight for OR today.  Hypokalemia repeat.  Blood cultures daily until clear.  Aerobic cultures with group B Streptococcus and Staph aureus.  Added vancomycin ( monitor for toxicity)    Interval history   s/p I&D and 5th ray amputation in the OR  on  07/14/2019.  ALISSA ordered to evaluate vascular status intraoperative Cultures pending . clean margin cultures and pathology pending. Deescalated from vanc and zosyn to cefazolin 6 gram IV cont infusion.  blood cultures from 07/13/2019 no growth.  Continues to have nausea vomiting since unable to tolerate anything by mouth.  Started on IV hydration and Reglan    7/22 Ready for DC, but infusion company is not sure if home environment is OK>    Review of Systems: List if applicable  Constitutional: Positive for activity change, appetite change (Poor appetite for the last and), chills, fatigue, fever ( weight loss) and unexpected weight change.   HENT: Negative for congestion, ear discharge, ear pain, facial swelling and sore throat.    Eyes: Negative for pain, discharge and itching.   Respiratory: Positive for shortness of breath ( at rest and exertion ). Negative for cough, chest tightness and wheezing.    Cardiovascular: Positive for leg swelling ( chronic). Negative for chest pain and palpitations.   Gastrointestinal: Positive for vomiting.  Negative for abdominal distention, abdominal pain, blood in stool, constipation and diarrhea.   Endocrine: Negative for cold intolerance.   Genitourinary: Negative for dysuria, hematuria and urgency.   Musculoskeletal: Negative for arthralgias, gait problem, myalgias, neck pain and neck stiffness.   Skin: Negative for color change, pallor and rash.   Allergic/Immunologic: Negative for environmental allergies.   Neurological: Positive for weakness and numbness ( bilateral lower extremities and hands attributes to diabetic neuropathy). Negative for dizziness, syncope, light-headedness and headaches.   Hematological: Negative for adenopathy.   Psychiatric/Behavioral: Negative for agitation, behavioral problems and confusion.     OBJECTIVE:     Vital Signs Range (Last 24H):  Temp:  [97.4 °F (36.3 °C)-98.6 °F (37 °C)]   Pulse:  []   Resp:  [18-20]   BP: (118-169)/()   SpO2:  [92 %-99 %]     Physical Exam:  Constitutional: He is oriented to person, place, and time. He appears well-developed and well-nourished.   HENT:   Head: Normocephalic and atraumatic.   Mouth/Throat: Oropharynx is clear and moist. No oropharyngeal exudate.   Eyes: Pupils are equal, round, and reactive to light. Conjunctivae and EOM are normal. Right eye exhibits no discharge. Left eye exhibits no discharge. No scleral icterus.   Neck: Normal range of motion. Neck supple. No JVD present. No tracheal deviation present. No thyromegaly present.   Cardiovascular: Normal rate, regular rhythm and normal heart sounds. Exam reveals no gallop and no friction rub.   No murmur heard.  Pulmonary/Chest: Effort normal and breath sounds normal. No stridor. No respiratory distress. He has no wheezes. He has no rales.   Abdominal: Soft. Bowel sounds are normal. He exhibits no distension and no mass. There is no tenderness. There is no guarding.   Musculoskeletal: Normal range of motion. He exhibits edema.   Lymphadenopathy:     He has no cervical  adenopathy.   Neurological: He is oriented to person, place, and time. No cranial nerve deficit.   Able to move upper and lower extremities without limitation   Skin: Skin is warm and dry.   Left foot: 6cm ulcer with exposed subcutaneous fat and eschar with foul smelling, purulent drainage overlying lateral aspect of left 5th MTP.     Right foot: 1.5cm clean based ulcer at base of right 5th MTP joint. Right great toe and second toe surgically amputated.     Psychiatric: He has a normal mood and affect. His behavior is normal.   Nursing note and vitals reviewed    Medications:  Medication list was reviewed and changes noted under Assessment/Plan.      Current Facility-Administered Medications:     0.9%  NaCl infusion, , Intravenous, Continuous, Vicente Wick MD, Last Rate: 75 mL/hr at 07/21/19 0613    acetaminophen tablet 650 mg, 650 mg, Oral, Q6H PRN, Vicente Wick MD, 650 mg at 07/17/19 0458    amLODIPine tablet 5 mg, 5 mg, Oral, Daily, Vicente Wick MD, 5 mg at 07/22/19 0820    atorvastatin tablet 80 mg, 80 mg, Oral, Daily, Vicente Wick MD, 80 mg at 07/22/19 0900    calcium carbonate 200 mg calcium (500 mg) chewable tablet 1,000 mg, 1,000 mg, Oral, BID PRN, Antonio Tate PA-C, 1,000 mg at 07/14/19 1850    ceFAZolin (ANCEF) 6 g in dextrose 5 % 500 mL CONTINUOUS INFUSION, 6 g, Intravenous, Q24H, RAMILA Kinsey, 6 g at 07/22/19 1258    dextrose 10% (D10W) Bolus, 12.5 g, Intravenous, PRN, Nakul Patel MD    dextrose 10% (D10W) Bolus, 25 g, Intravenous, PRN, Nakul Patel MD    enoxaparin injection 40 mg, 40 mg, Subcutaneous, Daily, Vicente Wick MD, 40 mg at 07/21/19 1638    glucagon (human recombinant) injection 1 mg, 1 mg, Intramuscular, PRN, Nakul Patel MD    glucose chewable tablet 16 g, 16 g, Oral, PRN, Nakul Patel MD    glucose chewable tablet 24 g, 24 g, Oral, PRN, Nakul Patel MD    hydrALAZINE tablet 25 mg, 25 mg, Oral, Q8H PRN, Vicente Wick,  MD, 25 mg at 07/22/19 1258    insulin aspart U-100 pen 0-5 Units, 0-5 Units, Subcutaneous, QID (AC + HS) PRN, Nakul Patel MD, 1 Units at 07/22/19 1144    insulin aspart U-100 pen 6 Units, 6 Units, Subcutaneous, TIDWM, Nakul Patel MD, 6 Units at 07/22/19 1144    insulin detemir U-100 pen 18 Units, 18 Units, Subcutaneous, QHS, Javid Saini MD    ondansetron injection 8 mg, 8 mg, Intravenous, Q8H PRN, Zeynep Duke PA-C    promethazine (PHENERGAN) 12.5 mg in dextrose 5 % 50 mL IVPB, 12.5 mg, Intravenous, Q6H PRN, Zeynep Duke PA-C, Last Rate: 150 mL/hr at 07/18/19 0012, 12.5 mg at 07/18/19 0012    sodium chloride 0.9% flush 10 mL, 10 mL, Intravenous, PRN, Vicente Wick MD    sodium chloride 0.9% flush 10 mL, 10 mL, Intravenous, PRN, Mariela Damon MD    acetaminophen, calcium carbonate, Dextrose 10% Bolus, Dextrose 10% Bolus, glucagon (human recombinant), glucose, glucose, hydrALAZINE, insulin aspart U-100, ondansetron, promethazine (PHENERGAN) IVPB, sodium chloride 0.9%, sodium chloride 0.9%    Laboratory/Diagnostic Data:  Reviewed and noted in plan where applicable- Please see chart for full lab data.    Recent Labs   Lab 07/20/19  0418 07/21/19  0350 07/22/19  0416   WBC 7.21 6.80 7.17   HGB 11.1* 10.7* 10.5*   HCT 34.8* 34.7* 33.8*    329 339       Recent Labs   Lab 07/20/19  0418  07/21/19  0350 07/21/19  0857 07/21/19  1950 07/22/19  0416 07/22/19  0810   NA  --    < >  --  142 141  --  140   K  --    < >  --  2.7* 2.7*  --  3.4*   CL  --    < >  --  96 97  --  99   CO2  --    < >  --  37* 34*  --  33*   BUN  --    < >  --  6 7  --  7   CREATININE  --    < >  --  1.1 1.0  --  1.1   GLU  --    < >  --  195* 157*  --  290*   CALCIUM  --    < >  --  8.8 8.5*  --  8.4*   MG 1.7  --  1.6  --   --  1.5*  --    PHOS 2.4*  --  2.3*  --   --  2.1*  --     < > = values in this interval not displayed.       Recent Labs   Lab 07/21/19  0857 07/21/19  1950  07/22/19  0810   ALKPHOS 111 106 111   ALT <5* <5* <5*   AST 17 13 12   ALBUMIN 2.4* 2.2* 2.2*   PROT 7.3 7.0 6.8   BILITOT 0.2 0.2 0.2        Microbiology labs for the last week  Microbiology Results (last 7 days)     Procedure Component Value Units Date/Time    Culture, Anaerobe [[id number]] Collected:  07/17/19 1214    Order Status:  Completed Specimen:  Wound from Foot, Left Updated:  07/22/19 1054     Anaerobic Culture Culture in progress    Narrative:       Left foot wound    Blood culture [[id number]] Collected:  07/15/19 0305    Order Status:  Completed Specimen:  Blood from Antecubital, Right Updated:  07/20/19 0822     Blood Culture, Routine No growth after 5 days.    Blood culture [[id number]] Collected:  07/15/19 0305    Order Status:  Completed Specimen:  Blood from Antecubital, Right Updated:  07/20/19 0822     Blood Culture, Routine No growth after 5 days.    Aerobic culture [[id number]] Collected:  07/17/19 1214    Order Status:  Completed Specimen:  Wound from Foot, Left Updated:  07/19/19 1106     Aerobic Bacterial Culture Skin janes,  no predominant organism    Narrative:       Left foot wound    Blood culture [[id number]] Collected:  07/14/19 0629    Order Status:  Completed Specimen:  Blood Updated:  07/19/19 1012     Blood Culture, Routine No growth after 5 days.    AFB Culture & Smear [[id number]] Collected:  07/17/19 1214    Order Status:  Completed Specimen:  Wound from Foot, Left Updated:  07/18/19 2127     AFB Culture & Smear Culture in progress     AFB CULTURE STAIN No acid fast bacilli seen.    Narrative:       Left foot wound    Culture, Anaerobe [[id number]]  (Abnormal) Collected:  07/14/19 1728    Order Status:  Completed Specimen:  Wound from Toe, Left Foot Updated:  07/18/19 1407     Anaerobic Culture PEPTONIPHILUS HAREI  Few      Narrative:       Left 5th metatarsal    Blood culture [[id number]] Collected:  07/13/19 0358    Order Status:  Completed Specimen:  Blood Updated:  07/18/19 0612      Blood Culture, Routine No growth after 5 days.    Blood culture [[id number]] Collected:  07/13/19 0358    Order Status:  Completed Specimen:  Blood Updated:  07/18/19 0612     Blood Culture, Routine No growth after 5 days.    Culture, Anaerobe [[id number]] Collected:  07/17/19 1659    Order Status:  Sent Specimen:  Tissue from Foot, Left Updated:  07/17/19 1659    Aerobic culture [[id number]] Collected:  07/17/19 1659    Order Status:  Sent Specimen:  Tissue from Foot, Left Updated:  07/17/19 1659    Fungus culture [[id number]] Collected:  07/17/19 1659    Order Status:  Sent Specimen:  Tissue from Foot, Left Updated:  07/17/19 1659    Gram stain [[id number]] Collected:  07/17/19 1659    Order Status:  Sent Specimen:  Tissue from Foot, Left Updated:  07/17/19 1659    AFB Culture & Smear [[id number]] Collected:  07/17/19 1659    Order Status:  Sent Specimen:  Tissue from Foot, Left Updated:  07/17/19 1659    Gram stain [[id number]] Collected:  07/17/19 1214    Order Status:  Completed Specimen:  Wound from Foot, Left Updated:  07/17/19 1403     Gram Stain Result No WBC's      No organisms seen    Narrative:       Left foot wound    Fungus culture [[id number]] Collected:  07/17/19 1214    Order Status:  Sent Specimen:  Wound from Foot, Left Updated:  07/17/19 1228    Culture, Anaerobe [[id number]]  (Abnormal) Collected:  07/12/19 1257    Order Status:  Completed Specimen:  Wound from Foot, Left Updated:  07/16/19 1436     Anaerobic Culture FINEGOLDIA MAGNA  Many      Aerobic culture [[id number]]  (Abnormal) Collected:  07/14/19 1728    Order Status:  Completed Specimen:  Wound from Toe, Left Foot Updated:  07/16/19 0850     Aerobic Bacterial Culture STREPTOCOCCUS AGALACTIAE (GROUP B)  Rare  Beta-hemolytic streptococci are routinely susceptible to   penicillins,cephalosporins and carbapenems.  Susceptibility testing not routinely performed  Skin janes also present      Narrative:       Left 5th metatarsal           Imaging  Results          MRI Foot (Forefoot) Left Without Contrast (Final result)  Result time 07/12/19 19:07:38    Final result by Bob Oglesby MD (07/12/19 19:07:38)                 Impression:      Ulceration involving the lateral plantar aspect of the distal left foot, with findings concerning for exposed bone involving the distal aspect of the 5th metatarsal and proximal phalange of the 5th toe.  There are associated changes concerning for osteomyelitis involving the distal aspect of the 5th metatarsal and entirety of the right toe.  There is osseous hyperemia involving the proximal phalange of the 2nd toe, early changes of osteomyelitis are a consideration no definite low signal on T1 at this time.      Electronically signed by: Bob Oglesby MD  Date:    07/12/2019  Time:    19:07             Narrative:    EXAMINATION:  MRI FOOT (FOREFOOT) LEFT WITHOUT CONTRAST    CLINICAL HISTORY:  Open wound of ankle, foot and toes;    TECHNIQUE:  Multiplanar multisequence MRI images of the left forefoot were obtained without administration of IV contrast.    COMPARISON:  Radiograph 07/12/2019    FINDINGS:  There is soft tissue ulceration involving the lateral plantar aspect of the right foot, noting there appears to be exposed bone in the location, likely involving the distal aspect of the 5th metatarsal head, and proximal phalange of the 5th toe. There is diffusely increased STIR signal within the distal aspect of the 5th metatarsal, and throughout the phalanges of the 5th toe. There is corresponding low T1 signal involving the same regions, concerning for osteomyelitis. There is additional abnormal STIR signal within the base of the proximal phalange of the 2nd toe, without convincing low signal on T1, suggesting osseous hyperemia although early changes of osteomyelitis remain a consideration. There is edema and induration about the open wound, no convincing focal organized fluid collection.  The remainder of the osseous  structures are grossly unremarkable. No significant joint effusions.  There is reactive edema about the intrinsic musculature of the foot, as well as along the dorsal surface.                                X-Ray Foot Complete Left (Final result)  Result time 07/12/19 11:06:16    Final result by Chandan Montana MD (07/12/19 11:06:16)                 Impression:      Abnormal examination with findings strongly suggesting osteomyelitis involving the base of the proximal phalanx of the left 5th toe and likely the head of the 5th metatarsal as well.    This report was flagged in [employer] as abnormal.      Electronically signed by: Chandan Montana MD  Date:    07/12/2019  Time:    11:06             Narrative:    EXAMINATION:  XR FOOT COMPLETE 3 VIEW LEFT    TECHNIQUE:  Three views of the left foot were obtained, with AP, lateral, and oblique projections submitted.    COMPARISON:  No relevant comparison examinations are currently available.    FINDINGS:  Focal soft tissue loss/irregularity involving the region of the left 5th MTP joint is observed, with gas present within the soft tissues at this level, the findings likely related to a clinically evident ulceration.  There is focal lytic bone destruction involving the base of the proximal phalanx of the 5th toe, and possibly involving the head of the 5th metatarsal as well, this radiographic appearance strongly suggesting osteomyelitis.  The remaining osseous structures appear intact, with no evidence of recent fracture and no additional areas of focal lytic bone destruction noted.  Some arterial vascular calcification in the soft tissues about the ankle is incidentally observed.                               X-Ray Chest AP Portable (Final result)  Result time 07/12/19 10:48:19    Final result by Chandan Montana MD (07/12/19 10:48:19)                 Impression:      No significant intrathoracic abnormality.  Allowing for differences in projection and a poorer inspiratory depth level on  "the current examination, there has been no significant detrimental interval change in the appearance of the chest since 05/03/2016.      Electronically signed by: Chandan Montana MD  Date:    07/12/2019  Time:    10:48             Narrative:    EXAMINATION:  XR CHEST AP PORTABLE    CLINICAL HISTORY:  hyperglycemia;    COMPARISON:  Comparison is made to the most recent prior chest radiograph, dated 05/03/2016.    FINDINGS:  Heart size is normal, as is the appearance of the pulmonary vascularity.  Lung zones are clear, and free of significant airspace consolidation or volume loss.  No pleural fluid.  No abnormal mediastinal widening.  No pneumothorax.  Incidental note is made of some spurring at the right acromioclavicular joint level.                                Estimated body mass index is 24.55 kg/m² as calculated from the following:    Height as of this encounter: 6' 1" (1.854 m).    Weight as of this encounter: 84.4 kg (186 lb 1.1 oz).    I & O (Last 24H):    Intake/Output Summary (Last 24 hours) at 7/22/2019 1647  Last data filed at 7/22/2019 0100  Gross per 24 hour   Intake --   Output 700 ml   Net -700 ml       Estimated Creatinine Clearance: 89.8 mL/min (based on SCr of 1.1 mg/dL).    ASSESSMENT/PLAN:     Active Problems:    Active Diagnoses:     Diagnosis Date Noted POA    PRINCIPAL PROBLEM:  Acute osteomyelitis [M86.10] x-ray left foot - strongly suggesting osteomyelitis involving the base of the proximal phalanx of the left 5th toe and likely the head of the 5th metatarsal as well.        recently lost his insurance and has been unable to follow up with wound care.  His last follow-up with primary care provider and podiatrist was about 2 years.  He has been managing his right foot ulcer at home with dressing but over the past 2 weeks an ulcer on his left foot has developed. He denies any pain or injury- has chronic numbness of bilateral lower extremities and hands from diabetic neuropathy.  Started on IV " Zosyn and vanc.  Podiatry and Infectious Disease consult.  aerobic culture with Group B strep.  Add vancomycin for Staph aureus  (monitor for toxicity)    07/14/2019   s/p I&D and 5th ray amputation in the OR  on  07/14/2019.  No weight-bearing left lower x-ray ALISSA ordered to evaluate vascular status intraoperative Cultures pending . clean margin cultures and pathology pending. Deescalated from vanc and zosyn to cefazolin 6 gram IV cont infusion.  blood cultures from 07/13/2019 no growth.  Continues to have nausea vomiting since unable to tolerate anything by mouth.  Started on IV hydration and Reglan 07/12/2019 Yes    Sepsis [A41.9] /bacteremia - blood cultures from 07/12/2019 group B Streptococcus. secondary to diabetic foot ulcer.  Elevated ESR greater than 120 As above 07/12/2019 Yes    Leukocytosis [D72.829] 14 secondary to sepsis. resolved  07/12/2019 Unknown    Anemia [D64.9] hemoglobin at 12 10.8 with IV hydration-->, normocytic.  Monitor 07/12/2019 Unknown    Type 2 diabetes mellitus with left eye affected by mild nonproliferative retinopathy without macular edema, without long-term current use of insulin [E11.3292]At baseline only takes insulin once a day stopped  taking more than a week because of nausea and vomiting and poor oral intake.  Does not check fingersticks regularly at home. recently lost his insurance.  Obtain HbA1c elevated greater than 14.  Continue insulin drip.  Endocrine evaluation 08/11/2017 Yes       Chronic    Diabetic ketoacidosis without coma associated with type 2 diabetes mellitus [E11.10]  Found to be in DKA in the emergency department with beta hydroxybutyrate elevated at 4.1.  Started on insulin drip.  Received normal saline in the emergency department    07/13/2019  Anion gap resolved- bicarbonate 23, insulin drip discontinued.  Started on    Started on transitional insulin drip as NPO from midnight. Start Levemir 14 units qhs at  9pm and Novolog 5 units with meals        Hypokalemia-hypomagnesemia replaced    Hypophosphatemia- placed   05/30/2017 Unknown    Type 2 diabetes mellitus with diabetic neuropathy, with long-term current use of insulin [E11.40, Z79.4] 05/03/2016 Not Applicable       Chronic    Mixed hyperlipidemia [E78.2] continue with Lipitor 08/12/2014 Yes    Essential hypertension [I10] blood pressure controlled without medications.  Monitor  06/06/2013 Yes       Chronic               Disposition- home    DVT prophylaxis addressed with:  Brian Rust M.D.

## 2019-07-22 NOTE — SUBJECTIVE & OBJECTIVE
"Interval HPI: Patient is now tolerating 75% of meals and has remained hyperglycemic. He wants to go home.  Overnight events: None  Eatin-100%  Nausea: No  Hypoglycemia and intervention: No  Fever: No  TPN and/or TF: No    BP (!) 139/103 (BP Location: Right arm, Patient Position: Lying)   Pulse 100   Temp 97.4 °F (36.3 °C) (Oral)   Resp 18   Ht 6' 1" (1.854 m)   Wt 84.4 kg (186 lb)   SpO2 99%   BMI 24.54 kg/m²     Labs Reviewed and Include    Recent Labs   Lab 19  0810   *   CALCIUM 8.4*   ALBUMIN 2.2*   PROT 6.8      K 3.4*   CO2 33*   CL 99   BUN 7   CREATININE 1.1   ALKPHOS 111   ALT <5*   AST 12   BILITOT 0.2     Lab Results   Component Value Date    WBC 7.17 2019    HGB 10.5 (L) 2019    HCT 33.8 (L) 2019    MCV 89 2019     2019     No results for input(s): TSH, FREET4 in the last 168 hours.  Lab Results   Component Value Date    HGBA1C >14.0 (H) 2019       Nutritional status:   Body mass index is 24.54 kg/m².  Lab Results   Component Value Date    ALBUMIN 2.2 (L) 2019    ALBUMIN 2.2 (L) 2019    ALBUMIN 2.4 (L) 2019     No results found for: PREALBUMIN    Estimated Creatinine Clearance: 89.8 mL/min (based on SCr of 1.1 mg/dL).    Accu-Checks  Recent Labs     19  0724 19  1120 19  1655 19  2145 19  0756 19  1140 19  1645 19  2128 19  0756 19  1142   POCTGLUCOSE 213* 213* 223* 271* 212* 96 228* 239* 279* 205*       Current Medications and/or Treatments Impacting Glycemic Control  Immunotherapy:    Immunosuppressants     None        Steroids:   Hormones (From admission, onward)    None        Pressors:    Autonomic Drugs (From admission, onward)    None        Hyperglycemia/Diabetes Medications:   Antihyperglycemics (From admission, onward)    Start     Stop Route Frequency Ordered    19 2100  insulin detemir U-100 pen 15 Units      -- SubQ Nightly 19 " 0918    07/21/19 1645  insulin aspart U-100 pen 6 Units      -- SubQ 3 times daily with meals 07/21/19 1200    07/20/19 2250  insulin aspart U-100 pen 0-5 Units      -- SubQ Before meals & nightly PRN 07/20/19 2152 07/20/19 1145  insulin regular (Humulin R) 100 Units in sodium chloride 0.9% 100 mL infusion      07/20 2300 IV Continuous 07/20/19 1035

## 2019-07-22 NOTE — PLAN OF CARE
Problem: Adult Inpatient Plan of Care  Goal: Plan of Care Review  POC reviewed with patient with pending discharge for tomorrow. PICC line placement order confirmed with Dr. Rust and approved to leave IV out. Wound vac in place. Patient has no distress noted at this time with sister at side. Home infusion therapy arranged for outpatient services.All MD orders implemented this shift. Will continue to monitor patient with siderails up x 2, call light in reach.

## 2019-07-22 NOTE — ASSESSMENT & PLAN NOTE
Indio Ryder Jr. is a 49 y.o. male  S/P Debridement left foot wound (DOS: 7/14 per Dr. 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 Almonte).    - Wound vac changed today, will continue wound vac at 125 mmHg continuous pressure while inpatient. Podiatry will change wound vac while inpatient  -Abx per ID, appreciate recs  - Non weight bearing left foot.  -recommend PT for gait training.    DC Instructions:  Patient is to follow up with podiatry within 10 days of discharge.  Call [phone]  to make an appointment.  Home health/facility to do wound vac dressing changes every  as follows:  Rinse with saline, pat dry, place adaptic over graft, apply wound vac, place vac on 125mmHg slow continuous.  Dress in cast padding and coban

## 2019-07-22 NOTE — SUBJECTIVE & OBJECTIVE
Subjective:     Interval History:   Patient seen at bedside s/p left foot partial 5th ray amputation with irrigation and debridement and application of graft. No acute distress. Denies any pedal pain. Denies calf pain. No new complaints. Dressings to left foot clean, dry, intact. Wound vac to left foot wound intact running @ 125 mmHg continuous pressure. Admits to nausea since ED admission, no vomiting, fever, chills.     7/20 Patient Seen at bedside for dressing change.  Wound vac intact.  Patient denies F/C/N/V.    7/22 Patient Seen at bedside for dressing change.  Wound vac intact.  Patient denies F/C/N/V.    Follow-up For: Procedure(s) (LRB):  DEBRIDEMENT, FOOT with leftt 5th ray partial amputation with Neox Graft (Left)    Post-Operative Day: 1 Day Post-Op    Scheduled Meds:   amLODIPine  5 mg Oral Daily    atorvastatin  80 mg Oral Daily    ceFAZolin(ANCEF) in D5W 500 mL CONTINUOUS INFUSION  6 g Intravenous Q24H    enoxaparin  40 mg Subcutaneous Daily    insulin aspart U-100  6 Units Subcutaneous TIDWM    insulin detemir U-100  15 Units Subcutaneous QHS     Continuous Infusions:   sodium chloride 0.9% 75 mL/hr at 07/21/19 0613     PRN Meds:acetaminophen, calcium carbonate, Dextrose 10% Bolus, Dextrose 10% Bolus, glucagon (human recombinant), glucose, glucose, hydrALAZINE, insulin aspart U-100, ondansetron, promethazine (PHENERGAN) IVPB, sodium chloride 0.9%, sodium chloride 0.9%    Review of Systems   Constitutional: Negative for appetite change, chills and fever.   Eyes: Negative for visual disturbance.   Respiratory: Negative for cough and shortness of breath.    Cardiovascular: Negative for chest pain and leg swelling.   Gastrointestinal: Negative for abdominal pain, constipation, diarrhea, nausea and vomiting.   Genitourinary: Negative for dysuria, frequency and hematuria.   Musculoskeletal: Negative for back pain and myalgias.   Skin: Positive for wound (left foot surgical wound; right foot  plantar ulcer). Negative for color change and rash.   Neurological: Negative for dizziness, light-headedness and headaches.   Psychiatric/Behavioral: Negative for agitation and behavioral problems. The patient is not nervous/anxious.      Objective:     Vital Signs (Most Recent):  Temp: 97.7 °F (36.5 °C) (07/22/19 0335)  Pulse: 92 (07/22/19 0335)  Resp: 18 (07/22/19 0335)  BP: (!) 152/92 (07/22/19 0335)  SpO2: 95 % (07/22/19 0335) Vital Signs (24h Range):  Temp:  [97.7 °F (36.5 °C)-98.6 °F (37 °C)] 97.7 °F (36.5 °C)  Pulse:  [] 92  Resp:  [18-20] 18  SpO2:  [92 %-99 %] 95 %  BP: (118-155)/(79-96) 152/92     Weight: 84.4 kg (186 lb)  Body mass index is 24.54 kg/m².    Foot Exam    General  Orientation: alert and oriented to person, place, and time       Right Foot/Ankle     Inspection and Palpation  Tenderness: none   Swelling: none   Skin Exam: ulcer;     Neurovascular  Dorsalis pedis: 1+  Posterior tibial: 1+  Saphenous nerve sensation: diminished  Tibial nerve sensation: diminished  Superficial peroneal nerve sensation: diminished  Deep peroneal nerve sensation: diminished  Sural nerve sensation: diminished      Left Foot/Ankle      Inspection and Palpation  Ecchymosis: none  Tenderness: none   Swelling: none   Skin Exam: drainage, skin changes and ulcer; (surgical wound 2/2 partial 5th ray amp with irrigation and debridement; proximal plantar sutures remain intact; serosanguinous drainage noted in wound vac cannister; no edema, no erythema, no signs of infection noted)    Neurovascular  Dorsalis pedis: 1+  Posterior tibial: 1+  Saphenous nerve sensation: diminished  Tibial nerve sensation: diminished  Superficial peroneal nerve sensation: diminished  Deep peroneal nerve sensation: diminished  Sural nerve sensation: diminished            Laboratory:  A1C:   Recent Labs   Lab 07/12/19  1023   HGBA1C >14.0*     CBC:   Recent Labs   Lab 07/22/19  0416   WBC 7.17   RBC 3.81*   HGB 10.5*   HCT 33.8*       MCV 89   MCH 27.6   MCHC 31.1*     CRP:   No results for input(s): CRP in the last 168 hours.  ESR:   No results for input(s): SEDRATE in the last 168 hours.  Wound Cultures:   Recent Labs   Lab 07/12/19  1257 07/14/19  1728 07/17/19 1214   LABAERO STREPTOCOCCUS AGALACTIAE (GROUP B)  Few  Beta-hemolytic streptococci are routinely susceptible to   penicillins,cephalosporins and carbapenems.  Susceptibility testing not routinely performed  *  STAPHYLOCOCCUS AUREUS  Few  * STREPTOCOCCUS AGALACTIAE (GROUP B)  Rare  Beta-hemolytic streptococci are routinely susceptible to   penicillins,cephalosporins and carbapenems.  Susceptibility testing not routinely performed  Skin janes also present  * Skin janes,  no predominant organism     Microbiology Results (last 7 days)     Procedure Component Value Units Date/Time    Culture, Anaerobe [[id number]] Collected:  07/17/19 1214    Order Status:  Completed Specimen:  Wound from Foot, Left Updated:  07/22/19 1054     Anaerobic Culture Culture in progress    Narrative:       Left foot wound    Blood culture [[id number]] Collected:  07/15/19 0305    Order Status:  Completed Specimen:  Blood from Antecubital, Right Updated:  07/20/19 0822     Blood Culture, Routine No growth after 5 days.    Blood culture [[id number]] Collected:  07/15/19 0305    Order Status:  Completed Specimen:  Blood from Antecubital, Right Updated:  07/20/19 0822     Blood Culture, Routine No growth after 5 days.    Aerobic culture [[id number]] Collected:  07/17/19 1214    Order Status:  Completed Specimen:  Wound from Foot, Left Updated:  07/19/19 1106     Aerobic Bacterial Culture Skin janes,  no predominant organism    Narrative:       Left foot wound    Blood culture [[id number]] Collected:  07/14/19 0629    Order Status:  Completed Specimen:  Blood Updated:  07/19/19 1012     Blood Culture, Routine No growth after 5 days.    AFB Culture & Smear [[id number]] Collected:  07/17/19 1214    Order Status:   Completed Specimen:  Wound from Foot, Left Updated:  07/18/19 2127     AFB Culture & Smear Culture in progress     AFB CULTURE STAIN No acid fast bacilli seen.    Narrative:       Left foot wound    Culture, Anaerobe [[id number]]  (Abnormal) Collected:  07/14/19 1728    Order Status:  Completed Specimen:  Wound from Toe, Left Foot Updated:  07/18/19 1407     Anaerobic Culture PEPTONIPHILUS HAREI  Few      Narrative:       Left 5th metatarsal    Blood culture [[id number]] Collected:  07/13/19 0358    Order Status:  Completed Specimen:  Blood Updated:  07/18/19 0612     Blood Culture, Routine No growth after 5 days.    Blood culture [[id number]] Collected:  07/13/19 0358    Order Status:  Completed Specimen:  Blood Updated:  07/18/19 0612     Blood Culture, Routine No growth after 5 days.    Culture, Anaerobe [[id number]] Collected:  07/17/19 1659    Order Status:  Sent Specimen:  Tissue from Foot, Left Updated:  07/17/19 1659    Aerobic culture [[id number]] Collected:  07/17/19 1659    Order Status:  Sent Specimen:  Tissue from Foot, Left Updated:  07/17/19 1659    Fungus culture [[id number]] Collected:  07/17/19 1659    Order Status:  Sent Specimen:  Tissue from Foot, Left Updated:  07/17/19 1659    Gram stain [[id number]] Collected:  07/17/19 1659    Order Status:  Sent Specimen:  Tissue from Foot, Left Updated:  07/17/19 1659    AFB Culture & Smear [[id number]] Collected:  07/17/19 1659    Order Status:  Sent Specimen:  Tissue from Foot, Left Updated:  07/17/19 1659    Gram stain [[id number]] Collected:  07/17/19 1214    Order Status:  Completed Specimen:  Wound from Foot, Left Updated:  07/17/19 1403     Gram Stain Result No WBC's      No organisms seen    Narrative:       Left foot wound    Fungus culture [[id number]] Collected:  07/17/19 1214    Order Status:  Sent Specimen:  Wound from Foot, Left Updated:  07/17/19 1228    Culture, Anaerobe [[id number]]  (Abnormal) Collected:  07/12/19 1257    Order Status:  Completed  Specimen:  Wound from Foot, Left Updated:  07/16/19 1436     Anaerobic Culture EILEENIA PATTIE  Many      Aerobic culture [[id number]]  (Abnormal) Collected:  07/14/19 1728    Order Status:  Completed Specimen:  Wound from Toe, Left Foot Updated:  07/16/19 0850     Aerobic Bacterial Culture STREPTOCOCCUS AGALACTIAE (GROUP B)  Rare  Beta-hemolytic streptococci are routinely susceptible to   penicillins,cephalosporins and carbapenems.  Susceptibility testing not routinely performed  Skin janes also present      Narrative:       Left 5th metatarsal        Specimen (12h ago, onward)    None          Diagnostic Results:  I have reviewed all pertinent imaging results/findings within the past 24 hours.    Clinical Findings:  Left foot with wound vac application intact; Running at 125 mmHg continuous pressure    Ulcer Location: left lateral plantar foot  Measurements:  Periwound: viable/slight maceration  Drainage: serosanguinous.  Base:  100% granular. 0% fibrin., wound graft intact  Signs of infection: None.     7/22              7/20            7/18      7/16      7/15 s/p I&D      7/12 post ED bedside debridement      7/12 pre ED bedside debridement

## 2019-07-22 NOTE — NURSING
Patient IV out Dr. Rust informed okay to leave IV out, PICC line insertion order placed for antibiotic therapy. Patient to be discharged home once  confirms infusion therapy for home.

## 2019-07-22 NOTE — ASSESSMENT & PLAN NOTE
Malnutrition in the context of Chronic Illness/Injury    Related to (etiology):  Inadequate energy intake    Signs and Symptoms (as evidenced by):  Energy Intake: <75% of estimated energy requirement for 1 month  Body Fat Depletion: severe depletion of orbitals and triceps   Muscle Mass Depletion: severe depletion of temples, clavicle region, scapular region and lower extremities   Weight Loss: 12% x 6 months    Interventions/Recommendations (treatment strategy):  Collaboration of nutrition care w/ other providers     Nutrition Diagnosis Status:  New

## 2019-07-23 LAB
ALBUMIN SERPL BCP-MCNC: 2.4 G/DL (ref 3.5–5.2)
ALBUMIN SERPL BCP-MCNC: 2.4 G/DL (ref 3.5–5.2)
ALP SERPL-CCNC: 112 U/L (ref 55–135)
ALP SERPL-CCNC: 114 U/L (ref 55–135)
ALT SERPL W/O P-5'-P-CCNC: <5 U/L (ref 10–44)
ALT SERPL W/O P-5'-P-CCNC: <5 U/L (ref 10–44)
ANION GAP SERPL CALC-SCNC: 11 MMOL/L (ref 8–16)
ANION GAP SERPL CALC-SCNC: 9 MMOL/L (ref 8–16)
AST SERPL-CCNC: 14 U/L (ref 10–40)
AST SERPL-CCNC: 16 U/L (ref 10–40)
BASOPHILS # BLD AUTO: 0.02 K/UL (ref 0–0.2)
BASOPHILS NFR BLD: 0.3 % (ref 0–1.9)
BILIRUB SERPL-MCNC: 0.2 MG/DL (ref 0.1–1)
BILIRUB SERPL-MCNC: 0.2 MG/DL (ref 0.1–1)
BUN SERPL-MCNC: 10 MG/DL (ref 6–20)
BUN SERPL-MCNC: 12 MG/DL (ref 6–20)
CALCIUM SERPL-MCNC: 9.1 MG/DL (ref 8.7–10.5)
CALCIUM SERPL-MCNC: 9.2 MG/DL (ref 8.7–10.5)
CHLORIDE SERPL-SCNC: 99 MMOL/L (ref 95–110)
CHLORIDE SERPL-SCNC: 99 MMOL/L (ref 95–110)
CO2 SERPL-SCNC: 30 MMOL/L (ref 23–29)
CO2 SERPL-SCNC: 32 MMOL/L (ref 23–29)
CREAT SERPL-MCNC: 1.1 MG/DL (ref 0.5–1.4)
CREAT SERPL-MCNC: 1.1 MG/DL (ref 0.5–1.4)
DIFFERENTIAL METHOD: ABNORMAL
EOSINOPHIL # BLD AUTO: 0.1 K/UL (ref 0–0.5)
EOSINOPHIL NFR BLD: 1.3 % (ref 0–8)
ERYTHROCYTE [DISTWIDTH] IN BLOOD BY AUTOMATED COUNT: 13 % (ref 11.5–14.5)
EST. GFR  (AFRICAN AMERICAN): >60 ML/MIN/1.73 M^2
EST. GFR  (AFRICAN AMERICAN): >60 ML/MIN/1.73 M^2
EST. GFR  (NON AFRICAN AMERICAN): >60 ML/MIN/1.73 M^2
EST. GFR  (NON AFRICAN AMERICAN): >60 ML/MIN/1.73 M^2
GLUCOSE SERPL-MCNC: 250 MG/DL (ref 70–110)
GLUCOSE SERPL-MCNC: 380 MG/DL (ref 70–110)
HCT VFR BLD AUTO: 34.3 % (ref 40–54)
HGB BLD-MCNC: 10.5 G/DL (ref 14–18)
IMM GRANULOCYTES # BLD AUTO: 0.03 K/UL (ref 0–0.04)
IMM GRANULOCYTES NFR BLD AUTO: 0.4 % (ref 0–0.5)
LYMPHOCYTES # BLD AUTO: 2.2 K/UL (ref 1–4.8)
LYMPHOCYTES NFR BLD: 29 % (ref 18–48)
MAGNESIUM SERPL-MCNC: 1.5 MG/DL (ref 1.6–2.6)
MCH RBC QN AUTO: 27.9 PG (ref 27–31)
MCHC RBC AUTO-ENTMCNC: 30.6 G/DL (ref 32–36)
MCV RBC AUTO: 91 FL (ref 82–98)
MONOCYTES # BLD AUTO: 0.6 K/UL (ref 0.3–1)
MONOCYTES NFR BLD: 8.2 % (ref 4–15)
NEUTROPHILS # BLD AUTO: 4.7 K/UL (ref 1.8–7.7)
NEUTROPHILS NFR BLD: 60.8 % (ref 38–73)
NRBC BLD-RTO: 0 /100 WBC
PHOSPHATE SERPL-MCNC: 2.4 MG/DL (ref 2.7–4.5)
PLATELET # BLD AUTO: 307 K/UL (ref 150–350)
PMV BLD AUTO: 10.3 FL (ref 9.2–12.9)
POCT GLUCOSE: 224 MG/DL (ref 70–110)
POCT GLUCOSE: 229 MG/DL (ref 70–110)
POCT GLUCOSE: 290 MG/DL (ref 70–110)
POCT GLUCOSE: 382 MG/DL (ref 70–110)
POTASSIUM SERPL-SCNC: 3.8 MMOL/L (ref 3.5–5.1)
POTASSIUM SERPL-SCNC: 3.9 MMOL/L (ref 3.5–5.1)
PROT SERPL-MCNC: 7.2 G/DL (ref 6–8.4)
PROT SERPL-MCNC: 7.3 G/DL (ref 6–8.4)
RBC # BLD AUTO: 3.77 M/UL (ref 4.6–6.2)
SODIUM SERPL-SCNC: 140 MMOL/L (ref 136–145)
SODIUM SERPL-SCNC: 140 MMOL/L (ref 136–145)
WBC # BLD AUTO: 7.72 K/UL (ref 3.9–12.7)

## 2019-07-23 PROCEDURE — 63600175 PHARM REV CODE 636 W HCPCS: Performed by: PHYSICIAN ASSISTANT

## 2019-07-23 PROCEDURE — C1751 CATH, INF, PER/CENT/MIDLINE: HCPCS

## 2019-07-23 PROCEDURE — 20600001 HC STEP DOWN PRIVATE ROOM

## 2019-07-23 PROCEDURE — 36415 COLL VENOUS BLD VENIPUNCTURE: CPT

## 2019-07-23 PROCEDURE — A4216 STERILE WATER/SALINE, 10 ML: HCPCS | Performed by: HOSPITALIST

## 2019-07-23 PROCEDURE — 25000003 PHARM REV CODE 250: Performed by: PHYSICIAN ASSISTANT

## 2019-07-23 PROCEDURE — 99232 SBSQ HOSP IP/OBS MODERATE 35: CPT | Mod: ,,, | Performed by: HOSPITALIST

## 2019-07-23 PROCEDURE — 25000003 PHARM REV CODE 250: Performed by: HOSPITALIST

## 2019-07-23 PROCEDURE — 94761 N-INVAS EAR/PLS OXIMETRY MLT: CPT

## 2019-07-23 PROCEDURE — 76937 US GUIDE VASCULAR ACCESS: CPT

## 2019-07-23 PROCEDURE — 84100 ASSAY OF PHOSPHORUS: CPT

## 2019-07-23 PROCEDURE — 85025 COMPLETE CBC W/AUTO DIFF WBC: CPT

## 2019-07-23 PROCEDURE — 63600175 PHARM REV CODE 636 W HCPCS: Performed by: GENERAL ACUTE CARE HOSPITAL

## 2019-07-23 PROCEDURE — 36569 INSJ PICC 5 YR+ W/O IMAGING: CPT

## 2019-07-23 PROCEDURE — 80053 COMPREHEN METABOLIC PANEL: CPT | Mod: 91

## 2019-07-23 PROCEDURE — 63600175 PHARM REV CODE 636 W HCPCS: Performed by: HOSPITALIST

## 2019-07-23 PROCEDURE — 83735 ASSAY OF MAGNESIUM: CPT

## 2019-07-23 PROCEDURE — 99232 PR SUBSEQUENT HOSPITAL CARE,LEVL II: ICD-10-PCS | Mod: ,,, | Performed by: HOSPITALIST

## 2019-07-23 RX ORDER — MAGNESIUM SULFATE HEPTAHYDRATE 40 MG/ML
2 INJECTION, SOLUTION INTRAVENOUS ONCE
Status: COMPLETED | OUTPATIENT
Start: 2019-07-23 | End: 2019-07-23

## 2019-07-23 RX ORDER — SODIUM CHLORIDE 0.9 % (FLUSH) 0.9 %
10 SYRINGE (ML) INJECTION
Status: DISCONTINUED | OUTPATIENT
Start: 2019-07-23 | End: 2019-08-02 | Stop reason: HOSPADM

## 2019-07-23 RX ORDER — SODIUM CHLORIDE 0.9 % (FLUSH) 0.9 %
10 SYRINGE (ML) INJECTION EVERY 6 HOURS
Status: DISCONTINUED | OUTPATIENT
Start: 2019-07-23 | End: 2019-08-02 | Stop reason: HOSPADM

## 2019-07-23 RX ORDER — AMLODIPINE BESYLATE 10 MG/1
10 TABLET ORAL DAILY
Status: DISCONTINUED | OUTPATIENT
Start: 2019-07-24 | End: 2019-08-02 | Stop reason: HOSPADM

## 2019-07-23 RX ADMIN — INSULIN ASPART 6 UNITS: 100 INJECTION, SOLUTION INTRAVENOUS; SUBCUTANEOUS at 12:07

## 2019-07-23 RX ADMIN — Medication 10 ML: at 06:07

## 2019-07-23 RX ADMIN — INSULIN ASPART 2 UNITS: 100 INJECTION, SOLUTION INTRAVENOUS; SUBCUTANEOUS at 12:07

## 2019-07-23 RX ADMIN — AMLODIPINE BESYLATE 5 MG: 5 TABLET ORAL at 08:07

## 2019-07-23 RX ADMIN — INSULIN ASPART 3 UNITS: 100 INJECTION, SOLUTION INTRAVENOUS; SUBCUTANEOUS at 09:07

## 2019-07-23 RX ADMIN — INSULIN ASPART 6 UNITS: 100 INJECTION, SOLUTION INTRAVENOUS; SUBCUTANEOUS at 04:07

## 2019-07-23 RX ADMIN — INSULIN ASPART 2 UNITS: 100 INJECTION, SOLUTION INTRAVENOUS; SUBCUTANEOUS at 08:07

## 2019-07-23 RX ADMIN — MAGNESIUM SULFATE IN WATER 2 G: 40 INJECTION, SOLUTION INTRAVENOUS at 08:07

## 2019-07-23 RX ADMIN — ACETAMINOPHEN 650 MG: 325 TABLET ORAL at 07:07

## 2019-07-23 RX ADMIN — INSULIN ASPART 6 UNITS: 100 INJECTION, SOLUTION INTRAVENOUS; SUBCUTANEOUS at 08:07

## 2019-07-23 RX ADMIN — HYDRALAZINE HYDROCHLORIDE 25 MG: 25 TABLET, FILM COATED ORAL at 08:07

## 2019-07-23 RX ADMIN — INSULIN ASPART 3 UNITS: 100 INJECTION, SOLUTION INTRAVENOUS; SUBCUTANEOUS at 04:07

## 2019-07-23 RX ADMIN — ATORVASTATIN CALCIUM 80 MG: 20 TABLET, FILM COATED ORAL at 08:07

## 2019-07-23 RX ADMIN — ENOXAPARIN SODIUM 40 MG: 100 INJECTION SUBCUTANEOUS at 04:07

## 2019-07-23 RX ADMIN — CEFAZOLIN 6 G: 1 INJECTION, POWDER, FOR SOLUTION INTRAMUSCULAR; INTRAVENOUS at 02:07

## 2019-07-23 RX ADMIN — SODIUM PHOSPHATE, MONOBASIC, MONOHYDRATE 15 MMOL: 276; 142 INJECTION, SOLUTION INTRAVENOUS at 08:07

## 2019-07-23 NOTE — PLAN OF CARE
07/23/19 1608   Post-Acute Status   Post-Acute Authorization Placement   Other Status See Comments     SW spoke with patient sister Roxi to discuss the discharge plan in regard to inpatient rehab. ALIYAH will continue to follow up.    Josefina Hammond LMSW  [facility]   [id number]

## 2019-07-23 NOTE — PLAN OF CARE
Problem: Adult Inpatient Plan of Care  Goal: Plan of Care Review  POC reviewed with patient and sister. Uneventful shift with PICC line inserted in right upper arm, patent. IV started in left upper arm, 20 gauge saline locked. Patient  Has been AAOX4, without any distress noted. Wound vac connected to left foot, suctioned as ordered. and  continuing to work on discharge to inpatient rehab as patient has agreed vs  Home infusion due to location. No other issues at this time, all MD orders implemented at this time. Safety maintained, call light in reach with siderails up x 2.

## 2019-07-23 NOTE — PLAN OF CARE
Problem: Adult Inpatient Plan of Care  Goal: Plan of Care Review  Outcome: Ongoing (interventions implemented as appropriate)     07/23/19 7371   Plan of Care Review   Plan of Care Reviewed With patient   Progress no change   POC reviewed with Pt. No acute events overnight. AAOx4. Awaiting PICC placement for d/c tomorrow. Home wound vac in place, dressing c/d/i. Vitals stable. Safety maintained. Will continue to monitor.

## 2019-07-23 NOTE — PLAN OF CARE
Endocrinology following for management of diabetes type 2. BGs remain out of goal 224-290 likely from increased appetite and oral intake.     Plan:   - Increase Detemir to 22 units qHS   - Continue pre-meal Novolog to 6 unit SC qAC   - Continue low dose correction insulin qAC and HS   - Accuchecks qAC and HS      Discharge Recommendations: If discharged please discharge on insulin regimen as above and follow up with PCP    Javid Saini  Endocrinology Fellow

## 2019-07-23 NOTE — PLAN OF CARE
to the bedside at this time to see the patient.  The patient is aware the CM team continues to follow throughout this admission for discharge needs and/or recommendations.   name and number remains on the board at the     07/23/19 1149   Discharge Reassessment   Assessment Type Discharge Planning Reassessment   Provided patient/caregiver education on the expected discharge date and the discharge plan Yes   Do you have any problems affording any of your prescribed medications? No   Discharge Plan A Home;Home Health   Discharge Plan B Rehab   DME Needed Upon Discharge    (TBD)   Patient choice form signed by patient/caregiver Yes   Anticipated Discharge Disposition Home-Health   Can the patient answer the patient profile reliably? Yes, cognitively intact   How does the patient rate their overall health at the present time? Fair    bedside for the patient or the family to call with discharge needs or questions.  Understanding verbalized.

## 2019-07-23 NOTE — PROCEDURES
"Indio Ryder Jr. is a 49 y.o. male patient.    Temp: 97.8 °F (36.6 °C) (07/23/19 0755)  Pulse: 101 (07/23/19 1138)  Resp: 18 (07/23/19 0755)  BP: (!) 170/96 (07/23/19 0755)  SpO2: 97 % (07/23/19 0900)  Weight: 84.4 kg (186 lb 1.1 oz) (07/22/19 1300)  Height: 6' 1" (185.4 cm) (07/22/19 1300)    PICC  Date/Time: 7/23/2019 12:24 PM  Performed by: Marian Sanchez RN  Consent Done: Yes  Time out: Immediately prior to procedure a time out was called to verify the correct patient, procedure, equipment, support staff and site/side marked as required  Indications: med administration and vascular access  Local anesthetic: lidocaine 1% without epinephrine  Anesthetic Total (mL): 4  Preparation: skin prepped with ChloraPrep  Skin prep agent dried: skin prep agent completely dried prior to procedure  Sterile barriers: all five maximum sterile barriers used - cap, mask, sterile gown, sterile gloves, and large sterile sheet  Hand hygiene: hand hygiene performed prior to central venous catheter insertion  Location details: right brachial  Catheter type: double lumen  Catheter size: 5 Fr  Catheter Length: 42cm    Ultrasound guidance: yes  Vessel Caliber: medium and patent, compressibility normal  Vascular Doppler: not done  Needle advanced into vessel with real time Ultrasound guidance.  Guidewire confirmed in vessel.  Image recorded and saved.  Sterile sheath used.  Number of attempts: 1  Post-procedure: blood return through all ports, chlorhexidine patch and sterile dressing applied  Specimens: No  Implants: No  Assessment: placement verified by x-ray  Complications: none          Andres Chaudhry  7/23/2019  "

## 2019-07-23 NOTE — PLAN OF CARE
07/23/19 1524   Post-Acute Status   Post-Acute Authorization Placement   Post-Acute Placement Status Referrals Sent     Patient will possibly discharge to inpatient rehab. SW sent referrals via [facility]. ALIYAH will follow up.    Josefina Hammond LMSW  [facility]   [id number]

## 2019-07-23 NOTE — CONSULTS
Double lumen PICC placed to -rt brachial vein. 42 cm in length, 0 cm exposed, 29 cm circumference.  Lot # [id number]

## 2019-07-23 NOTE — NURSING
Josefina Hammond phoned, message left to speak with sister and patient of progression of care and plans.

## 2019-07-24 PROBLEM — R26.89 IMPAIRED GAIT AND MOBILITY: Status: ACTIVE | Noted: 2019-07-24

## 2019-07-24 LAB
ALBUMIN SERPL BCP-MCNC: 2.4 G/DL (ref 3.5–5.2)
ALBUMIN SERPL BCP-MCNC: 2.5 G/DL (ref 3.5–5.2)
ALBUMIN SERPL BCP-MCNC: 2.6 G/DL (ref 3.5–5.2)
ALP SERPL-CCNC: 110 U/L (ref 55–135)
ALP SERPL-CCNC: 117 U/L (ref 55–135)
ALP SERPL-CCNC: 121 U/L (ref 55–135)
ALT SERPL W/O P-5'-P-CCNC: 6 U/L (ref 10–44)
ALT SERPL W/O P-5'-P-CCNC: 7 U/L (ref 10–44)
ALT SERPL W/O P-5'-P-CCNC: <5 U/L (ref 10–44)
ANION GAP SERPL CALC-SCNC: 10 MMOL/L (ref 8–16)
ANION GAP SERPL CALC-SCNC: 10 MMOL/L (ref 8–16)
ANION GAP SERPL CALC-SCNC: 11 MMOL/L (ref 8–16)
AST SERPL-CCNC: 16 U/L (ref 10–40)
AST SERPL-CCNC: 16 U/L (ref 10–40)
AST SERPL-CCNC: 18 U/L (ref 10–40)
BASOPHILS # BLD AUTO: 0.02 K/UL (ref 0–0.2)
BASOPHILS NFR BLD: 0.3 % (ref 0–1.9)
BILIRUB SERPL-MCNC: 0.2 MG/DL (ref 0.1–1)
BUN SERPL-MCNC: 11 MG/DL (ref 6–20)
BUN SERPL-MCNC: 12 MG/DL (ref 6–20)
BUN SERPL-MCNC: 12 MG/DL (ref 6–20)
CALCIUM SERPL-MCNC: 8.8 MG/DL (ref 8.7–10.5)
CALCIUM SERPL-MCNC: 9.3 MG/DL (ref 8.7–10.5)
CALCIUM SERPL-MCNC: 9.5 MG/DL (ref 8.7–10.5)
CHLORIDE SERPL-SCNC: 101 MMOL/L (ref 95–110)
CHLORIDE SERPL-SCNC: 97 MMOL/L (ref 95–110)
CHLORIDE SERPL-SCNC: 98 MMOL/L (ref 95–110)
CO2 SERPL-SCNC: 27 MMOL/L (ref 23–29)
CO2 SERPL-SCNC: 28 MMOL/L (ref 23–29)
CO2 SERPL-SCNC: 32 MMOL/L (ref 23–29)
CREAT SERPL-MCNC: 1 MG/DL (ref 0.5–1.4)
CREAT SERPL-MCNC: 1 MG/DL (ref 0.5–1.4)
CREAT SERPL-MCNC: 1.1 MG/DL (ref 0.5–1.4)
DIFFERENTIAL METHOD: ABNORMAL
EOSINOPHIL # BLD AUTO: 0.1 K/UL (ref 0–0.5)
EOSINOPHIL NFR BLD: 0.9 % (ref 0–8)
ERYTHROCYTE [DISTWIDTH] IN BLOOD BY AUTOMATED COUNT: 13.1 % (ref 11.5–14.5)
EST. GFR  (AFRICAN AMERICAN): >60 ML/MIN/1.73 M^2
EST. GFR  (NON AFRICAN AMERICAN): >60 ML/MIN/1.73 M^2
GLUCOSE SERPL-MCNC: 271 MG/DL (ref 70–110)
GLUCOSE SERPL-MCNC: 337 MG/DL (ref 70–110)
GLUCOSE SERPL-MCNC: 343 MG/DL (ref 70–110)
HCT VFR BLD AUTO: 33.8 % (ref 40–54)
HGB BLD-MCNC: 10.4 G/DL (ref 14–18)
IMM GRANULOCYTES # BLD AUTO: 0.02 K/UL (ref 0–0.04)
IMM GRANULOCYTES NFR BLD AUTO: 0.3 % (ref 0–0.5)
LYMPHOCYTES # BLD AUTO: 1.7 K/UL (ref 1–4.8)
LYMPHOCYTES NFR BLD: 26.4 % (ref 18–48)
MAGNESIUM SERPL-MCNC: 1.8 MG/DL (ref 1.6–2.6)
MCH RBC QN AUTO: 27.6 PG (ref 27–31)
MCHC RBC AUTO-ENTMCNC: 30.8 G/DL (ref 32–36)
MCV RBC AUTO: 90 FL (ref 82–98)
MONOCYTES # BLD AUTO: 0.6 K/UL (ref 0.3–1)
MONOCYTES NFR BLD: 8.8 % (ref 4–15)
NEUTROPHILS # BLD AUTO: 4 K/UL (ref 1.8–7.7)
NEUTROPHILS NFR BLD: 63.3 % (ref 38–73)
NRBC BLD-RTO: 0 /100 WBC
PHOSPHATE SERPL-MCNC: 2.4 MG/DL (ref 2.7–4.5)
PLATELET # BLD AUTO: 333 K/UL (ref 150–350)
PMV BLD AUTO: 10.6 FL (ref 9.2–12.9)
POCT GLUCOSE: 164 MG/DL (ref 70–110)
POCT GLUCOSE: 170 MG/DL (ref 70–110)
POCT GLUCOSE: 304 MG/DL (ref 70–110)
POCT GLUCOSE: 408 MG/DL (ref 70–110)
POCT GLUCOSE: 461 MG/DL (ref 70–110)
POTASSIUM SERPL-SCNC: 3.8 MMOL/L (ref 3.5–5.1)
POTASSIUM SERPL-SCNC: 3.8 MMOL/L (ref 3.5–5.1)
POTASSIUM SERPL-SCNC: 4 MMOL/L (ref 3.5–5.1)
PROT SERPL-MCNC: 6.9 G/DL (ref 6–8.4)
PROT SERPL-MCNC: 6.9 G/DL (ref 6–8.4)
PROT SERPL-MCNC: 7.6 G/DL (ref 6–8.4)
RBC # BLD AUTO: 3.77 M/UL (ref 4.6–6.2)
SODIUM SERPL-SCNC: 135 MMOL/L (ref 136–145)
SODIUM SERPL-SCNC: 139 MMOL/L (ref 136–145)
SODIUM SERPL-SCNC: 140 MMOL/L (ref 136–145)
WBC # BLD AUTO: 6.36 K/UL (ref 3.9–12.7)

## 2019-07-24 PROCEDURE — 25000003 PHARM REV CODE 250: Performed by: PHYSICIAN ASSISTANT

## 2019-07-24 PROCEDURE — 63600175 PHARM REV CODE 636 W HCPCS: Performed by: HOSPITALIST

## 2019-07-24 PROCEDURE — 99232 SBSQ HOSP IP/OBS MODERATE 35: CPT | Mod: ,,, | Performed by: HOSPITALIST

## 2019-07-24 PROCEDURE — 80053 COMPREHEN METABOLIC PANEL: CPT | Mod: 91

## 2019-07-24 PROCEDURE — 99232 PR SUBSEQUENT HOSPITAL CARE,LEVL II: ICD-10-PCS | Mod: ,,, | Performed by: HOSPITALIST

## 2019-07-24 PROCEDURE — 84100 ASSAY OF PHOSPHORUS: CPT

## 2019-07-24 PROCEDURE — 83735 ASSAY OF MAGNESIUM: CPT

## 2019-07-24 PROCEDURE — 99232 SBSQ HOSP IP/OBS MODERATE 35: CPT | Mod: ,,, | Performed by: NURSE PRACTITIONER

## 2019-07-24 PROCEDURE — 85025 COMPLETE CBC W/AUTO DIFF WBC: CPT

## 2019-07-24 PROCEDURE — 20600001 HC STEP DOWN PRIVATE ROOM

## 2019-07-24 PROCEDURE — 36415 COLL VENOUS BLD VENIPUNCTURE: CPT

## 2019-07-24 PROCEDURE — A4216 STERILE WATER/SALINE, 10 ML: HCPCS | Performed by: HOSPITALIST

## 2019-07-24 PROCEDURE — 97161 PT EVAL LOW COMPLEX 20 MIN: CPT

## 2019-07-24 PROCEDURE — 63600175 PHARM REV CODE 636 W HCPCS: Performed by: PHYSICIAN ASSISTANT

## 2019-07-24 PROCEDURE — 97165 OT EVAL LOW COMPLEX 30 MIN: CPT

## 2019-07-24 PROCEDURE — 25000003 PHARM REV CODE 250: Performed by: HOSPITALIST

## 2019-07-24 PROCEDURE — 99232 PR SUBSEQUENT HOSPITAL CARE,LEVL II: ICD-10-PCS | Mod: ,,, | Performed by: NURSE PRACTITIONER

## 2019-07-24 RX ORDER — INSULIN ASPART 100 [IU]/ML
8 INJECTION, SOLUTION INTRAVENOUS; SUBCUTANEOUS
Status: DISCONTINUED | OUTPATIENT
Start: 2019-07-24 | End: 2019-07-25

## 2019-07-24 RX ADMIN — ATORVASTATIN CALCIUM 80 MG: 20 TABLET, FILM COATED ORAL at 08:07

## 2019-07-24 RX ADMIN — INSULIN ASPART 4 UNITS: 100 INJECTION, SOLUTION INTRAVENOUS; SUBCUTANEOUS at 08:07

## 2019-07-24 RX ADMIN — AMLODIPINE BESYLATE 10 MG: 10 TABLET ORAL at 08:07

## 2019-07-24 RX ADMIN — INSULIN ASPART 6 UNITS: 100 INJECTION, SOLUTION INTRAVENOUS; SUBCUTANEOUS at 08:07

## 2019-07-24 RX ADMIN — Medication 10 ML: at 06:07

## 2019-07-24 RX ADMIN — INSULIN ASPART 8 UNITS: 100 INJECTION, SOLUTION INTRAVENOUS; SUBCUTANEOUS at 05:07

## 2019-07-24 RX ADMIN — Medication 10 ML: at 12:07

## 2019-07-24 RX ADMIN — CEFAZOLIN 6 G: 1 INJECTION, POWDER, FOR SOLUTION INTRAMUSCULAR; INTRAVENOUS at 01:07

## 2019-07-24 RX ADMIN — ENOXAPARIN SODIUM 40 MG: 100 INJECTION SUBCUTANEOUS at 05:07

## 2019-07-24 RX ADMIN — INSULIN ASPART 8 UNITS: 100 INJECTION, SOLUTION INTRAVENOUS; SUBCUTANEOUS at 11:07

## 2019-07-24 RX ADMIN — INSULIN ASPART 3 UNITS: 100 INJECTION, SOLUTION INTRAVENOUS; SUBCUTANEOUS at 10:07

## 2019-07-24 NOTE — PLAN OF CARE
07/24/19 1037   Post-Acute Status   Post-Acute Authorization Placement   Post-Acute Placement Status Pending Payor Review     Patient expected to discharge to [facility] pending AUTH. SW will continue to follow up.  Josefina Hammond LMSW  [facility]   [id number]

## 2019-07-24 NOTE — HPI
Indio Ryder is a 49-year-old male with PMHx of HTN, HLD, poorly controlled DMII (on long-term insulin) with associated diabetic neuropathy and R foot ulcer, ulcerative colitis, traumatic R 5th toe amputation (7/2015), and R foot osteomyelitis s/p R toe amputation (1/2017).  Patient presented to [facility] on 7/12/19 for nausea and vomiting x 4-5 days with associated subjective fevers and chills.  Also reported new L foot ulcer x ~2 weeks.  Found have leukocytosis and elevated beta hydroxybutyrate.  Blood cultures (7/12/19) + group B streptococcus.  Wound cultures (7/12/19) + finegoldia magna, group B streptococcus, and MSSA.  L foot x-ray concerning for osteomyelitis of partial 5th ray.  Started on insulin infusion and IV Zosyn and admitted to Hospital Medicine for DKA, sepsis/bactermia, and acute osteomyelitis of L foot.  Endocrine, ID, and Podiatry consulted.  Underwent L foot wound debridement on 7/14/19 with subsequent L foot partial 5th ray amputation with debridement and washout and wound vac placement on 7/17/19; post-op NWB L foot.  ID recommended IV Cefazolin x 2 weeks from surgery (7/17/19) if cultures negative vs 6 weeks if concern for residual osteomyelitis; cultures pending.          Functional History: Patient lives in [place], alone, in a single story home with 3 steps to enter.  Prior to admission, he was (I) with ADLs, including driving, and mobility.  Plans to discharge to sister's home in [place].  DME: none.

## 2019-07-24 NOTE — ASSESSMENT & PLAN NOTE
-  Baseline function-->   -  NWB LLE  Recommendations  -  Encourage mobility, OOB in chair, and early ambulation as appropriate  -  PT/OT evaluate and treat  -  Pain management  -  DVT prophylaxis  -  Monitor for and prevent skin breakdown and pressure ulcers  · Early mobility, repositioning/weight shifting every 20-30 minutes when sitting, turn patient every 2 hours, proper mattress/overlay and chair cushioning, pressure relief/heel protector boots

## 2019-07-24 NOTE — CONSULTS
[facility]  Physical Medicine & Rehab  Consult Note    Patient Name: Indio Ryder Jr.  MRN: [id number]  Admission Date: 7/12/2019  Hospital Length of Stay: 12 days  Attending Physician: Vicente Wick MD     Inpatient consult to Physical Medicine & Rehabilitation  Consult performed by: Shivani Whitmroe NP  Consult requested by:  Vicente Wick MD    Collaborating Physician: Alyx Orozco MD  Reason for Consult:  Rehab evaluation      Consults  Subjective:     Principal Problem: Bacteremia due to group B Streptococcus    HPI: Indio Ryder is a 49-year-old male with PMHx of HTN, HLD, poorly controlled DMII (on long-term insulin) with associated diabetic neuropathy and R foot ulcer, ulcerative colitis, traumatic R 5th toe amputation (7/2015), and R foot osteomyelitis s/p R toe amputation (1/2017).  Patient presented to [facility] on 7/12/19 for nausea and vomiting x 4-5 days with associated subjective fevers and chills.  Also reported new L foot ulcer x ~2 weeks.  Found have leukocytosis and elevated beta hydroxybutyrate.  Blood cultures (7/12/19) + group B streptococcus.  Wound cultures (7/12/19) + finegoldia magna, group B streptococcus, and MSSA.  L foot x-ray concerning for osteomyelitis of partial 5th ray.  Started on insulin infusion and IV Zosyn and admitted to Hospital Medicine for DKA, sepsis/bactermia, and acute osteomyelitis of L foot.  Endocrine, ID, and Podiatry consulted.  Underwent L foot wound debridement on 7/14/19 with subsequent L foot partial 5th ray amputation with debridement and washout and wound vac placement on 7/17/19; post-op NWB L foot.  ID recommended IV Cefazolin x 2 weeks from surgery (7/17/19) if cultures negative vs 6 weeks if concern for residual osteomyelitis; cultures pending.          Functional History: Patient lives in [place], alone, in a single story home with 3 steps to enter.  Prior to admission, he was (I) with ADLs, including driving, and  mobility.  Plans to discharge to sister's home in [place].  DME: none.    Hospital Course:   7/23/19:  Therapy evaluations pending.   7/24/19:  Evaluated by PT.  Bed mobility SV.  Sit to stand and transfers CGA with RW.  Ambulated ~8 ft CGA with RW.  OT evaluation pending.     Past Medical History:   Diagnosis Date    Actinomyces infection 1/17/2017    Right foot    Diabetic ketoacidosis without coma associated with type 2 diabetes mellitus 5/30/2017    Diabetic ulcer of right foot associated with type 2 diabetes mellitus 6/3/2015    Essential hypertension 6/6/2013    Group B streptococcal infection 1/13/2017    Mixed hyperlipidemia 8/12/2014    Shoulder impingement 8/12/2014    Subacute osteomyelitis of right foot 1/12/2017    Streptococcus agalactiae, Actinomyces odontolyticus    Traumatic amputation of fifth toe of right foot 7/2/2015    Type 2 diabetes mellitus with diabetic neuropathy, with long-term current use of insulin 5/3/2016    Ulcerative colitis, unspecified      Past Surgical History:   Procedure Laterality Date    AMPUTATION-TOE Right 6/5/2015    Performed by William Manuel D[facility] at [facility] OR    AMPUTATION-TOE and Tendo achilles lengthening Right 1/19/2017    Performed by Ramirez Wilkes DPM at [facility]    DEBRIDEMENT, FOOT with leftt 5th ray partial amputation with Neox Graft Left 7/17/2019    Performed by [facility]rell [facility] at [facility] OR 2ND FLR    DEBRIDEMENT, FOOT, with left 5th ray amputation Left 7/14/2019    Performed by Sis Hickman DPM at [facility] OR 2ND FLR    INCISION AND DRAINAGE (I&D), FOOT Right 1/16/2017    Performed by Ramirez Wilkes DPM at [facility]    IRRIGATION AND DEBRIDEMENT of right foot ulcer Right 7/14/2019    Performed by Sis Hickman DPM at [facility] OR 2ND FLR    RESECTION-METATARSAL HEAD Right 1/16/2017    Performed by Ramirez Wilkes DPM at [facility]    TOE AMPUTATION Right 06/05/2015    TOE AMPUTATION Right 01/19/2017     Review of patient's  allergies indicates:  No Known Allergies    Scheduled Medications:    amLODIPine  10 mg Oral Daily    atorvastatin  80 mg Oral Daily    ceFAZolin(ANCEF) in D5W 500 mL CONTINUOUS INFUSION  6 g Intravenous Q24H    enoxaparin  40 mg Subcutaneous Daily    insulin aspart U-100  8 Units Subcutaneous TIDWM    insulin detemir U-100  26 Units Subcutaneous QHS    sodium chloride 0.9%  10 mL Intravenous Q6H       PRN Medications: acetaminophen, calcium carbonate, Dextrose 10% Bolus, Dextrose 10% Bolus, glucagon (human recombinant), glucose, glucose, hydrALAZINE, insulin aspart U-100, ondansetron, promethazine (PHENERGAN) IVPB, sodium chloride 0.9%, sodium chloride 0.9%, Flushing PICC Protocol **AND** sodium chloride 0.9% **AND** sodium chloride 0.9%    Family History     Problem Relation (Age of Onset)    Cancer Mother    Cataracts Father    Diabetes Mother, Sister    Hypertension Mother, Father, Maternal Aunt    No Known Problems Brother, Sister, Sister, Maternal Uncle, Paternal Aunt, Paternal Uncle, Maternal Grandmother, Maternal Grandfather, Paternal Grandmother, Paternal Grandfather        Tobacco Use    Smoking status: Never Smoker    Smokeless tobacco: Never Used   Substance and Sexual Activity    Alcohol use: No    Drug use: No    Sexual activity: Yes     Partners: Female     Birth control/protection: Condom     Review of Systems   Constitutional: Positive for activity change. Negative for chills and fever.   HENT: Negative for drooling, hearing loss, trouble swallowing and voice change.    Eyes: Negative for pain and visual disturbance.   Respiratory: Negative for cough, shortness of breath and wheezing.    Cardiovascular: Positive for leg swelling. Negative for chest pain and palpitations.   Gastrointestinal: Negative for abdominal pain, nausea and vomiting.   Genitourinary: Negative for difficulty urinating and flank pain.   Musculoskeletal: Positive for gait problem. Negative for back pain, myalgias and  neck pain.   Skin: Positive for wound. Negative for rash.   Neurological: Positive for numbness. Negative for dizziness and headaches.   Psychiatric/Behavioral: Negative for agitation and hallucinations. The patient is not nervous/anxious.      Objective:     Vital Signs (Most Recent):  Temp: 98 °F (36.7 °C) (07/24/19 0838)  Pulse: 108 (07/24/19 1125)  Resp: 16 (07/24/19 0838)  BP: (!) 144/94 (07/24/19 0838)  SpO2: (!) 93 % (07/24/19 0838)    Vital Signs (24h Range):  Temp:  [96.6 °F (35.9 °C)-98.4 °F (36.9 °C)] 98 °F (36.7 °C)  Pulse:  [] 108  Resp:  [16-18] 16  SpO2:  [92 %-98 %] 93 %  BP: (134-163)/(74-94) 144/94     Body mass index is 26.53 kg/m².    Physical Exam   Constitutional: He is oriented to person, place, and time. He appears well-developed and well-nourished. No distress.   HENT:   Head: Normocephalic and atraumatic.   Right Ear: External ear normal.   Left Ear: External ear normal.   Nose: Nose normal.   Eyes: Right eye exhibits no discharge. Left eye exhibits no discharge. No scleral icterus.   Neck: Normal range of motion.   Cardiovascular: Normal rate and intact distal pulses.   Pulmonary/Chest: Effort normal. No respiratory distress. He has no wheezes.   Abdominal: Soft. He exhibits no distension. There is no tenderness.   Musculoskeletal: He exhibits edema. He exhibits no tenderness.   R foot with 1st and 5th amputations- R foot plantar dressing intact  L foot with dressing and wound vac intact   Neurological: He is alert and oriented to person, place, and time. He exhibits normal muscle tone.   Skin: Skin is warm and dry. No rash noted.   Psychiatric: He has a normal mood and affect. His behavior is normal.   Vitals reviewed.    Diagnostic Results:   Labs: Reviewed  X-Ray: Reviewed  US: Reviewed  MRI: Reviewed    Assessment/Plan:     * Bacteremia due to group B Streptococcus  -  Presented for nausea and vomiting x 4-5 days with associated subjective fevers and chills and new L foot ulcer x  ~2 weeks  -  Blood cultures (7/12/19) + group B streptococcus--> repeat NGTD (since 7/13)  -  Wound cultures (7/12/19) + finegoldia magna, group B streptococcus, and MSSA    -  Podiatry consulted--> L foot x-ray concerning for osteomyelitis of partial 5th ray  -  S/p L foot wound debridement on 7/14/19 with subsequent L foot partial 5th ray amputation with debridement and washout and wound vac placement on 7/17/19  -  NWB L foot  -  ID consulted--> recommended IV Cefazolin x 2 weeks from surgery (7/17/19) if cultures negative vs 6 weeks if concern for residual osteomyelitis- cultures pending    Impaired gait and mobility  -  Baseline function-->   -  NWB LLE  Recommendations  -  Encourage mobility, OOB in chair, and early ambulation as appropriate  -  PT/OT evaluate and treat  -  Pain management  -  DVT prophylaxis  -  Monitor for and prevent skin breakdown and pressure ulcers  · Early mobility, repositioning/weight shifting every 20-30 minutes when sitting, turn patient every 2 hours, proper mattress/overlay and chair cushioning, pressure relief/heel protector boots    Acute osteomyelitis  Foot ulcer  -  Chronic R diabetic foot ulcer  -  Admitted with new L foot ulcer x ~2 weeks  -  See bacteremia 2/2 GBS    Diabetic ketoacidosis without coma associated with type 2 diabetes mellitus  -  Elevated beta hydroxybutyrate on arrival  -  Endocrine following  -  Resolved     Patient with rehab goals.  OT evaluation pending.  Will need insurance approval for rehab admission.  Will follow for final post-acute recommendation.    Thank you for your consult.     RONNY Garcia  Department of Physical Medicine & Rehab  [facility]

## 2019-07-24 NOTE — PT/OT/SLP EVAL
Occupational Therapy   Evaluation    Name: Indio Ryder Jr.  MRN: [id number]  Admitting Diagnosis:  Bacteremia due to group B Streptococcus 7 Days Post-Op    Recommendations:     Discharge Recommendations: rehabilitation facility  Discharge Equipment Recommendations:  walker, rolling, shower chair  Barriers to discharge:  None    Assessment:     Indio Ryder Jr. is a 49 y.o. male with a medical diagnosis of Bacteremia due to group B Streptococcus.  He presents alert and willing to participate in therapy session. Upon arrival, pt found supine with no family present. Education provided on LLE WB status and demo'd understanding however needs reinforcement. Performance deficits affecting function: impaired endurance, weakness, impaired functional mobilty, gait instability, impaired self care skills, impaired balance, orthopedic precautions. Pt would benefit from rehab following d/c to continue to progress towards goals and improve quality of life.     Rehab Prognosis: Good; patient would benefit from acute skilled OT services to address these deficits and reach maximum level of function.       Plan:     Patient to be seen 3 x/week to address the above listed problems via self-care/home management, therapeutic activities, therapeutic exercises, neuromuscular re-education  · Plan of Care Expires: 08/22/19  · Plan of Care Reviewed with: patient    Subjective     Chief Complaint: LLE pain ; RUE pain 2/2 PICC line  Patient/Family Comments/goals: Return to PLOF    Occupational Profile:  Living Environment: Pt will be living with sister, 1SH with 1STE; bathroom contains both walk-in shower and tub-shower  Previous level of function: PTA, pt reports being independent with ADL and functional mobility   Roles and Routines: Home/community dweller, drives, does not work   Equipment Used at Home:  none  Assistance upon Discharge: Pt will have assistance from sister    Pain/Comfort:  · Pain Rating 1: 7/10  · Location - Side 1:  Left  · Location 1: arm  · Pain Addressed 1: Reposition, Distraction  · Pain Rating Post-Intervention 1: 7/10    Patients cultural, spiritual, Voodoo conflicts given the current situation: no    Objective:     Communicated with: RN prior to session.  Patient found supine with PICC line, wound vac upon OT entry to room.    General Precautions: Standard, fall   Orthopedic Precautions:LLE non weight bearing   Braces: N/A     Occupational Performance:    Bed Mobility:    · Patient completed Rolling/Turning to Left with  supervision  · Patient completed Supine to Sit with supervision    Functional Mobility/Transfers:  · Patient completed Sit <> Stand Transfer with contact guard assistance  with  rolling walker   · Patient completed Bed <> Chair Transfer using Stand Pivot technique with contact guard assistance with rolling walker  · Functional Mobility: Pt completed functional mobility within room for ADl prep requiring CGA and RW ( pt required minimal cues to adhere to WB px)    Activities of Daily Living:  · Upper Body Dressing: minimum assistance to arnav gown like jacket while seated EOB    Cognitive/Visual Perceptual:  Cognitive/Psychosocial Skills:     -       Oriented to: Person, Place, Time and Situation   -       Follows Commands/attention:Follows multistep  commands  -       Communication: clear/fluent  -       Safety awareness/insight to disability: intact   -       Mood/Affect/Coping skills/emotional control: Appropriate to situation  Visual/Perceptual:      -Intact     Physical Exam:  Postural examination/scapula alignment:    -       Rounded shoulders  Skin integrity: Visible skin intact  Edema:  None noted  Dominant hand:    -       RUE  Upper Extremity Range of Motion:     -       Right Upper Extremity: WFL  -       Left Upper Extremity: WFL  Upper Extremity Strength:    -       Right Upper Extremity: WFL 5/5  -       Left Upper Extremity: WFL  5/5   Strength:    -       Right Upper Extremity:  WFL  -       Left Upper Extremity: WFL  Fine Motor Coordination:    -       Intact    AMPAC 6 Click ADL:  AMPAC Total Score: 21    Treatment & Education:  -Pt edu on OT role/POC  -Importance of OOB activity with staff assistance ( UIC for each meal)  -Safety during functional t/f and mobility  -White board updated  -All questions/concerns answered within OT scope of practice  - Pt edu on LLE WB pxs-- pt requires minimal cues to adhere  Education:    Patient left up in chair with all lines intact, call button in reach and RN notified    GOALS:   Multidisciplinary Problems     Occupational Therapy Goals        Problem: Occupational Therapy Goal    Goal Priority Disciplines Outcome Interventions   Occupational Therapy Goal     OT, PT/OT Ongoing (interventions implemented as appropriate)    Description:  Goals to be met by: 8/3/19    Patient will increase functional independence with ADLs by performing:    UE Dressing with Supervision.  LE Dressing with Stand-by Assistance.  Grooming while standing at sink with Stand-by Assistance.  Toileting from toilet with Stand-by Assistance for hygiene and clothing management.   Supine to sit with [place].  Toilet transfer to toilet with Stand-by Assistance.                      History:     Past Medical History:   Diagnosis Date    Actinomyces infection 1/17/2017    Right foot    Diabetic ketoacidosis without coma associated with type 2 diabetes mellitus 5/30/2017    Diabetic ulcer of right foot associated with type 2 diabetes mellitus 6/3/2015    Essential hypertension 6/6/2013    Group B streptococcal infection 1/13/2017    Mixed hyperlipidemia 8/12/2014    Shoulder impingement 8/12/2014    Subacute osteomyelitis of right foot 1/12/2017    Streptococcus agalactiae, Actinomyces odontolyticus    Traumatic amputation of fifth toe of right foot 7/2/2015    Type 2 diabetes mellitus with diabetic neuropathy, with long-term current use of insulin 5/3/2016    Ulcerative  colitis, unspecified        Past Surgical History:   Procedure Laterality Date    AMPUTATION-TOE Right 6/5/2015    Performed by [facility] Manuel [facility] at [facility]    AMPUTATION-TOE and Tendo achilles lengthening Right 1/19/2017    Performed by Ramirez Wilkes DPM at [facility]    DEBRIDEMENT, FOOT with leftt 5th ray partial amputation with Neox Graft Left 7/17/2019    Performed by Mai [facility]rell [facility] 2ND FLR    DEBRIDEMENT, FOOT, with left 5th ray amputation Left 7/14/2019    Performed by Sis Hickman DPM at [facility] 2ND [facility]LR    INCISION AND DRAINAGE (I&D), FOOT Right 1/16/2017    Performed by Ramirez Wilkes [facility] at [facility] OR    IRRIGATION AND DEBRIDEMENT of right foot ulcer Right 7/14/2019    Performed by Sis Hickman D[facility] at [facility] 2ND [facility]LR    RESECTION-METATARSAL HEAD Right 1/16/2017    Performed by Ramirez Wilkes DPM at [facility] OR    TOE AMPUTATION Right 06/05/2015    TOE AMPUTATION Right 01/19/2017       Time Tracking:     OT Date of Treatment: 07/24/19  OT Start Time: 1051  OT Stop Time: 1106  OT Total Time (min): 15 min    Billable Minutes:Evaluation 15    Miac Ray, OT  7/24/2019

## 2019-07-24 NOTE — PROGRESS NOTES
Progress Note  Hospital Medicine    Admit Date: 7/12/2019  Length of Stay:  LOS: 12 days     SUBJECTIVE:         Follow-up For:  Bacteremia due to group B Streptococcus    HPI/Interval history (See H&P for complete P,F,SHx) :   Over view  Indio Ryder Jr. is a 49 y.o. male with a PMH of T2DM (poorly  controlled, with long term insulin use) and HTN who presented to the ED on 7/12/2019 diagnosed with  Vomiting (vomiting for past 4 days, denies sick contacts)   Oriented x3 accompanied by daughter at the bedside.  Mentions that he wears lower extremity stockings for edema. Reportedly formed a new ulcer on the left lateral aspect of the foot about 10 days back.  had 4-5 days of nausea and vomiting. Vomiting is non-bloody, No abdominal pain..  It occurs immediately after eating and he has not been able to keep anything down. Associated with fevers and chills but no objective temperature measurement.  At baseline only takes insulin once a day stop taking more than a week because of nausea and vomiting and poor oral intake.  Does not check fingersticks regularly at home. recently lost his insurance and has been unable to follow up with wound care.  His last follow-up with primary care provider and podiatrist was about 2 years.  He has been managing his right foot ulcer at home with dressing but over the past 2 weeks an ulcer on his left foot has developed. He denies any pain or injury- has chronic numbness of bilateral lower extremities and hands from diabetic neuropathy.  Found to be in DKA in the emergency department with beta hydroxybutyrate elevated at 4.1.  Started on insulin drip    07/13/2019   Temperature of 101.5 last night.  Anion gap resolved- bicarbonate 23, insulin drip discontinued.  Started on Levemir 23 units q.a.m. 7 units as part t.i.d. with meals with low-dose sliding scale.  Diabetic diet and NPO from midnight for possible intervention.  MRI of the foot- Ulceration involving the lateral plantar  aspect of the distal left foot, with findings concerning for exposed bone involving the distal aspect of the 5th metatarsal and proximal phalange of the 5th toe.  There are associated changes concerning for osteomyelitis involving the distal aspect of the 5th metatarsal and entirety of the right toe.  There is osseous hyperemia involving the proximal phalange of the 2nd toe, early changes of osteomyelitis are a consideration . aerobic culture  and blood culture with Group B strep. discontinued vancomycin       07/14/2019  Had urinary retention of 800 mL but spontaneously voided.  Started on transitional insulin drip as NPO from midnight for OR today.  Hypokalemia repeat.  Blood cultures daily until clear.  Aerobic cultures with group B Streptococcus and Staph aureus.  Added vancomycin ( monitor for toxicity)    07/15/2019   s/p I&D and 5th ray amputation in the OR  on  07/14/2019.  ALISSA ordered to evaluate vascular status intraoperative Cultures pending . clean margin cultures and pathology pending. Deescalated from vanc and zosyn to cefazolin 6 gram IV cont infusion.  blood cultures from 07/13/2019 no growth.  Continues to have nausea vomiting since unable to tolerate anything by mouth.  Started on IV hydration and Reglan    07/23/2019  underwent repeat I&D and left partial 5th ray amputation 7/17.   Per OP note,  purulent drainage was expressed proximal to distal from the level of the plantar aspect of the left calcaneus to the open wound on the plantar midfoot. Purulent drainage was also manually expressed from the plantar distal aspect of the forefoot. Cultures of this were sent.   A small portion of the distal metatarsal was reported as sent as a clean margin to Micro and pathology.  The wound was partially closed, Neox allograft and wound Vac placed.  Repeat blood cultures remain NGTD.   ABIs without significant PAD.   Tolerating oral diet without nausea and vomiting.  Status post PICC line placement on  07/23/2019.  Wound VAC in place to left foot.  PT evaluation, crutches ordered per Podiatry.  No weight-bearing to left foot    Interval history  Awaiting insurance authorization for rehab placement    Review of Systems: List if applicable  Constitutional: Positive for activity change, appetite change (Poor appetite for the last and), chills, fatigue, fever ( weight loss) and unexpected weight change.   HENT: Negative for congestion, ear discharge, ear pain, facial swelling and sore throat.    Eyes: Negative for pain, discharge and itching.   Respiratory: Positive for shortness of breath ( at rest and exertion ). Negative for cough, chest tightness and wheezing.    Cardiovascular: Positive for leg swelling ( chronic). Negative for chest pain and palpitations.   Gastrointestinal: Positive for vomiting improved. Negative for abdominal distention, abdominal pain, blood in stool, constipation and diarrhea.   Endocrine: Negative for cold intolerance.   Genitourinary: Negative for dysuria, hematuria and urgency.   Musculoskeletal: Negative for arthralgias, gait problem, myalgias, neck pain and neck stiffness.   Skin: Negative for color change, pallor and rash.   Allergic/Immunologic: Negative for environmental allergies.   Neurological: Positive for weakness and numbness ( bilateral lower extremities and hands attributes to diabetic neuropathy). Negative for dizziness, syncope, light-headedness and headaches.   Hematological: Negative for adenopathy.   Psychiatric/Behavioral: Negative for agitation, behavioral problems and confusion.     OBJECTIVE:     Vital Signs Range (Last 24H):  Temp:  [96.6 °F (35.9 °C)-98.4 °F (36.9 °C)]   Pulse:  []   Resp:  [16-18]   BP: (134-163)/(74-94)   SpO2:  [92 %-99 %]     Physical Exam:  Constitutional: He is oriented to person, place, and time. He appears well-developed and well-nourished.   HENT:   Head: Normocephalic and atraumatic.   Mouth/Throat: Oropharynx is clear and moist.  No oropharyngeal exudate.   Eyes: Pupils are equal, round, and reactive to light. Conjunctivae and EOM are normal. Right eye exhibits no discharge. Left eye exhibits no discharge. No scleral icterus.   Neck: Normal range of motion. Neck supple. No JVD present. No tracheal deviation present. No thyromegaly present.   Cardiovascular: Normal rate, regular rhythm and normal heart sounds. Exam reveals no gallop and no friction rub.   No murmur heard.  Pulmonary/Chest: Effort normal and breath sounds normal. No stridor. No respiratory distress. He has no wheezes. He has no rales.   Abdominal: Soft. Bowel sounds are normal. He exhibits no distension and no mass. There is no tenderness. There is no guarding.   Musculoskeletal: Normal range of motion. He exhibits edema.   Lymphadenopathy:     He has no cervical adenopathy.   Neurological: He is oriented to person, place, and time. No cranial nerve deficit.   Able to move upper and lower extremities without limitation   Skin: Skin is warm and dry.   Left foot:  With wound VAC in place.     Right foot: 1.5cm clean based ulcer at base of right 5th MTP joint. Right great toe and second toe surgically amputated.     Psychiatric: He has a normal mood and affect. His behavior is normal.   Nursing note and vitals reviewed    Medications:  Medication list was reviewed and changes noted under Assessment/Plan.      Current Facility-Administered Medications:     acetaminophen tablet 650 mg, 650 mg, Oral, Q6H PRN, Vicente Wick MD, 650 mg at 07/23/19 1938    amLODIPine tablet 10 mg, 10 mg, Oral, Daily, Vicente Wick MD, 10 mg at 07/24/19 0850    atorvastatin tablet 80 mg, 80 mg, Oral, Daily, Vicente Wick MD, 80 mg at 07/24/19 0850    calcium carbonate 200 mg calcium (500 mg) chewable tablet 1,000 mg, 1,000 mg, Oral, BID PRN, Antonio Tate PA-C, 1,000 mg at 07/14/19 1850    ceFAZolin (ANCEF) 6 g in dextrose 5 % 500 mL CONTINUOUS INFUSION, 6 g, Intravenous, Q24H,  RAMILA Kinsey, 6 g at 07/24/19 1320    dextrose 10% (D10W) Bolus, 12.5 g, Intravenous, PRN, Nakul Patel MD    dextrose 10% (D10W) Bolus, 25 g, Intravenous, PRN, Nakul Patel MD    enoxaparin injection 40 mg, 40 mg, Subcutaneous, Daily, Vicente Wick MD, 40 mg at 07/23/19 1614    glucagon (human recombinant) injection 1 mg, 1 mg, Intramuscular, PRN, Nakul Patel MD    glucose chewable tablet 16 g, 16 g, Oral, PRN, Nakul Patel MD    glucose chewable tablet 24 g, 24 g, Oral, PRN, Nakul Patel MD    hydrALAZINE tablet 25 mg, 25 mg, Oral, Q8H PRN, Vicente Wick MD, 25 mg at 07/23/19 0804    insulin aspart U-100 pen 0-5 Units, 0-5 Units, Subcutaneous, QID (AC + HS) PRN, Nakul Patel MD, 4 Units at 07/24/19 0842    insulin aspart U-100 pen 8 Units, 8 Units, Subcutaneous, TIDWM, Javid Saini MD, 8 Units at 07/24/19 1144    insulin detemir U-100 pen 26 Units, 26 Units, Subcutaneous, QHS, Javid Saini MD    ondansetron injection 8 mg, 8 mg, Intravenous, Q8H PRN, Zeynep Duke PA-C    promethazine (PHENERGAN) 12.5 mg in dextrose 5 % 50 mL IVPB, 12.5 mg, Intravenous, Q6H PRN, Zeynep Duke PA-C, Last Rate: 150 mL/hr at 07/18/19 0012, 12.5 mg at 07/18/19 0012    sodium chloride 0.9% flush 10 mL, 10 mL, Intravenous, PRN, Vicente Wick MD    sodium chloride 0.9% flush 10 mL, 10 mL, Intravenous, PRN, Mariela Damon MD    Flushing PICC Protocol, , , Until Discontinued **AND** sodium chloride 0.9% flush 10 mL, 10 mL, Intravenous, Q6H, 10 mL at 07/24/19 1200 **AND** sodium chloride 0.9% flush 10 mL, 10 mL, Intravenous, PRN, Vicente Wick MD    acetaminophen, calcium carbonate, Dextrose 10% Bolus, Dextrose 10% Bolus, glucagon (human recombinant), glucose, glucose, hydrALAZINE, insulin aspart U-100, ondansetron, promethazine (PHENERGAN) IVPB, sodium chloride 0.9%, sodium chloride 0.9%, Flushing PICC Protocol **AND** sodium chloride 0.9%  **AND** sodium chloride 0.9%    Laboratory/Diagnostic Data:  Reviewed and noted in plan where applicable- Please see chart for full lab data.    Recent Labs   Lab 07/22/19 0416 07/23/19 0333 07/24/19  0352   WBC 7.17 7.72 6.36   HGB 10.5* 10.5* 10.4*   HCT 33.8* 34.3* 33.8*    307 333       Recent Labs   Lab 07/22/19 0416 07/23/19 0333 07/23/19 2042 07/24/19 0352 07/24/19  0820   NA  --    < >  --    < > 140 139 135*   K  --    < >  --    < > 3.9 3.8 3.8   CL  --    < >  --    < > 99 97 98   CO2  --    < >  --    < > 30* 32* 27   BUN  --    < >  --    < > 12 12 12   CREATININE  --    < >  --    < > 1.1 1.1 1.0   GLU  --    < >  --    < > 380* 337* 343*   CALCIUM  --    < >  --    < > 9.1 8.8 9.3   MG 1.5*  --  1.5*  --   --  1.8  --    PHOS 2.1*  --  2.4*  --   --  2.4*  --     < > = values in this interval not displayed.       Recent Labs   Lab 07/23/19 2042 07/24/19 0352 07/24/19  0820   ALKPHOS 114 110 117   ALT <5* <5* 7*   AST 16 18 16   ALBUMIN 2.4* 2.4* 2.5*   PROT 7.3 6.9 6.9   BILITOT 0.2 0.2 0.2        Microbiology labs for the last week  Microbiology Results (last 7 days)     Procedure Component Value Units Date/Time    Fungus culture [[id number]] Collected:  07/17/19 1214    Order Status:  Completed Specimen:  Wound from Foot, Left Updated:  07/24/19 1044     Fungus (Mycology) Culture Culture in progress    Narrative:       Left foot wound    Culture, Anaerobe [[id number]] Collected:  07/17/19 1214    Order Status:  Completed Specimen:  Wound from Foot, Left Updated:  07/23/19 1349     Anaerobic Culture Culture in progress    Narrative:       Left foot wound    Blood culture [[id number]] Collected:  07/15/19 0305    Order Status:  Completed Specimen:  Blood from Antecubital, Right Updated:  07/20/19 0822     Blood Culture, Routine No growth after 5 days.    Blood culture [[id number]] Collected:  07/15/19 0305    Order Status:  Completed Specimen:  Blood from Antecubital, Right Updated:   07/20/19 0822     Blood Culture, Routine No growth after 5 days.    Aerobic culture [[id number]] Collected:  07/17/19 1214    Order Status:  Completed Specimen:  Wound from Foot, Left Updated:  07/19/19 1106     Aerobic Bacterial Culture Skin janes,  no predominant organism    Narrative:       Left foot wound    Blood culture [[id number]] Collected:  07/14/19 0629    Order Status:  Completed Specimen:  Blood Updated:  07/19/19 1012     Blood Culture, Routine No growth after 5 days.    AFB Culture & Smear [[id number]] Collected:  07/17/19 1214    Order Status:  Completed Specimen:  Wound from Foot, Left Updated:  07/18/19 2127     AFB Culture & Smear Culture in progress     AFB CULTURE STAIN No acid fast bacilli seen.    Narrative:       Left foot wound    Culture, Anaerobe [[id number]]  (Abnormal) Collected:  07/14/19 1728    Order Status:  Completed Specimen:  Wound from Toe, Left Foot Updated:  07/18/19 1407     Anaerobic Culture PEPTONIPHILUS HAREI  Few      Narrative:       Left 5th metatarsal    Blood culture [[id number]] Collected:  07/13/19 0358    Order Status:  Completed Specimen:  Blood Updated:  07/18/19 0612     Blood Culture, Routine No growth after 5 days.    Blood culture [[id number]] Collected:  07/13/19 0358    Order Status:  Completed Specimen:  Blood Updated:  07/18/19 0612     Blood Culture, Routine No growth after 5 days.    Culture, Anaerobe [[id number]] Collected:  07/17/19 1659    Order Status:  Sent Specimen:  Tissue from Foot, Left Updated:  07/17/19 1659    Aerobic culture [[id number]] Collected:  07/17/19 1659    Order Status:  Sent Specimen:  Tissue from Foot, Left Updated:  07/17/19 1659    Fungus culture [[id number]] Collected:  07/17/19 1659    Order Status:  Sent Specimen:  Tissue from Foot, Left Updated:  07/17/19 1659    Gram stain [[id number]] Collected:  07/17/19 1659    Order Status:  Sent Specimen:  Tissue from Foot, Left Updated:  07/17/19 1659    AFB Culture & Smear [[id number]]  Collected:  07/17/19 1659    Order Status:  Sent Specimen:  Tissue from Foot, Left Updated:  07/17/19 1659           Imaging Results          MRI Foot (Forefoot) Left Without Contrast (Final result)  Result time 07/12/19 19:07:38    Final result by Bob Oglesby MD (07/12/19 19:07:38)             Impression:      Ulceration involving the lateral plantar aspect of the distal left foot, with findings concerning for exposed bone involving the distal aspect of the 5th metatarsal and proximal phalange of the 5th toe.  There are associated changes concerning for osteomyelitis involving the distal aspect of the 5th metatarsal and entirety of the right toe.  There is osseous hyperemia involving the proximal phalange of the 2nd toe, early changes of osteomyelitis are a consideration no definite low signal on T1 at this time.      Electronically signed by: Bob Oglesby MD  Date:    07/12/2019  Time:    19:07           Narrative:    EXAMINATION:  MRI FOOT (FOREFOOT) LEFT WITHOUT CONTRAST    CLINICAL HISTORY:  Open wound of ankle, foot and toes;    TECHNIQUE:  Multiplanar multisequence MRI images of the left forefoot were obtained without administration of IV contrast.    COMPARISON:  Radiograph 07/12/2019    FINDINGS:  There is soft tissue ulceration involving the lateral plantar aspect of the right foot, noting there appears to be exposed bone in the location, likely involving the distal aspect of the 5th metatarsal head, and proximal phalange of the 5th toe. There is diffusely increased STIR signal within the distal aspect of the 5th metatarsal, and throughout the phalanges of the 5th toe. There is corresponding low T1 signal involving the same regions, concerning for osteomyelitis. There is additional abnormal STIR signal within the base of the proximal phalange of the 2nd toe, without convincing low signal on T1, suggesting osseous hyperemia although early changes of osteomyelitis remain a consideration. There is  edema and induration about the open wound, no convincing focal organized fluid collection.  The remainder of the osseous structures are grossly unremarkable. No significant joint effusions.  There is reactive edema about the intrinsic musculature of the foot, as well as along the dorsal surface.                              X-Ray Foot Complete Left (Final result)  Result time 07/12/19 11:06:16    Final result by Chandan Montana MD (07/12/19 11:06:16)             Impression:      Abnormal examination with findings strongly suggesting osteomyelitis involving the base of the proximal phalanx of the left 5th toe and likely the head of the 5th metatarsal as well.    This report was flagged in Epic as abnormal.      Electronically signed by: Chandan Montana MD  Date:    07/12/2019  Time:    11:06           Narrative:    EXAMINATION:  XR FOOT COMPLETE 3 VIEW LEFT    TECHNIQUE:  Three views of the left foot were obtained, with AP, lateral, and oblique projections submitted.    COMPARISON:  No relevant comparison examinations are currently available.    FINDINGS:  Focal soft tissue loss/irregularity involving the region of the left 5th MTP joint is observed, with gas present within the soft tissues at this level, the findings likely related to a clinically evident ulceration.  There is focal lytic bone destruction involving the base of the proximal phalanx of the 5th toe, and possibly involving the head of the 5th metatarsal as well, this radiographic appearance strongly suggesting osteomyelitis.  The remaining osseous structures appear intact, with no evidence of recent fracture and no additional areas of focal lytic bone destruction noted.  Some arterial vascular calcification in the soft tissues about the ankle is incidentally observed.                             X-Ray Chest AP Portable (Final result)  Result time 07/12/19 10:48:19    Final result by Chandan Montana MD (07/12/19 10:48:19)             Impression:      No significant  "intrathoracic abnormality.  Allowing for differences in projection and a poorer inspiratory depth level on the current examination, there has been no significant detrimental interval change in the appearance of the chest since 05/03/2016.      Electronically signed by: Chandan Montana MD  Date:    07/12/2019  Time:    10:48           Narrative:    EXAMINATION:  XR CHEST AP PORTABLE    CLINICAL HISTORY:  hyperglycemia;    COMPARISON:  Comparison is made to the most recent prior chest radiograph, dated 05/03/2016.    FINDINGS:  Heart size is normal, as is the appearance of the pulmonary vascularity.  Lung zones are clear, and free of significant airspace consolidation or volume loss.  No pleural fluid.  No abnormal mediastinal widening.  No pneumothorax.  Incidental note is made of some spurring at the right acromioclavicular joint level.                              Estimated body mass index is 26.53 kg/m² as calculated from the following:    Height as of this encounter: 6' 1" (1.854 m).    Weight as of this encounter: 91.2 kg (201 lb 1 oz).    I & O (Last 24H):    Intake/Output Summary (Last 24 hours) at 7/24/2019 1408  Last data filed at 7/24/2019 0000  Gross per 24 hour   Intake no documentation   Output 1700 ml   Net -1700 ml       Estimated Creatinine Clearance: 98.8 mL/min (based on SCr of 1 mg/dL).    ASSESSMENT/PLAN:     Active Problems:    Active Diagnoses:     Diagnosis Date Noted POA    PRINCIPAL PROBLEM:  Acute osteomyelitis [M86.10] x-ray left foot - strongly suggesting osteomyelitis involving the base of the proximal phalanx of the left 5th toe and likely the head of the 5th metatarsal as well.        recently lost his insurance and has been unable to follow up with wound care.  His last follow-up with primary care provider and podiatrist was about 2 years.  He has been managing his right foot ulcer at home with dressing but over the past 2 weeks an ulcer on his left foot has developed. He denies any pain " or injury- has chronic numbness of bilateral lower extremities and hands from diabetic neuropathy.  Started on IV Zosyn and vanc.  Podiatry and Infectious Disease consult.  aerobic culture with Group B strep.  Add vancomycin for Staph aureus  (monitor for toxicity)    07/14/2019   s/p I&D and 5th ray amputation in the OR  on  07/14/2019.  No weight-bearing left lower x-ray ALISSA ordered to evaluate vascular status intraoperative Cultures pending . clean margin cultures and pathology pending. Deescalated from vanc and zosyn to cefazolin 6 gram IV cont infusion.  blood cultures from 07/13/2019 no growth.  Continues to have nausea vomiting since unable to tolerate anything by mouth.  Started on IV hydration and Reglan    07/23/19  underwent repeat I&D and left partial 5th ray amputation 7/17.   Per OP note,  purulent drainage was expressed proximal to distal from the level of the plantar aspect of the left calcaneus to the open wound on the plantar midfoot. Purulent drainage was also manually expressed from the plantar distal aspect of the forefoot. Cultures of this were sent.   A small portion of the distal metatarsal was reported as sent as a clean margin to Micro and pathology.  The wound was partially closed, Neox allograft and wound Vac placed.  Repeat blood cultures remain NGTD.   ABIs without significant PAD.   Tolerating oral diet without nausea and vomiting.  Status post PICC line placement on 07/23/2019.  Wound VAC in place to left foot.  PT evaluation, crutches ordered per Podiatry.  No weight-bearing to left foot . If all infection removed in the OR and clean margins negative, anticipate 2 weeks of IV antibiotics for bacteremia and skin and soft tissue infection from date of surgery, 7/17.   If there is concern for residual osteomyelitis, will need 6 weeks of IV therapy.plan on minimum of 14 days of abx, with ID follow-up for final duration 07/12/2019 Yes    Sepsis [A41.9] /bacteremia - blood cultures from  07/12/2019 group B Streptococcus. secondary to diabetic foot ulcer.  Elevated ESR greater than 120 As above 07/12/2019 Yes    Leukocytosis [D72.829] 14 secondary to sepsis. resolved  07/12/2019 Unknown    Anemia [D64.9] hemoglobin at 12 10.8 with IV hydration-->, normocytic.  Monitor 07/12/2019 Unknown    Type 2 diabetes mellitus with left eye affected by mild nonproliferative retinopathy without macular edema, without long-term current use of insulin [E11.3292]At baseline only takes insulin once a day stopped  taking more than a week because of nausea and vomiting and poor oral intake.  Does not check fingersticks regularly at home. recently lost his insurance.  Obtain HbA1c elevated greater than 14.   on Levemir 22 units nightly and NovoLog 6 units t.i.d. with meals 08/11/2017 Yes       Chronic    Diabetic ketoacidosis without coma associated with type 2 diabetes mellitus [E11.10]  Found to be in DKA in the emergency department with beta hydroxybutyrate elevated at 4.1.  Started on insulin drip.  Received normal saline in the emergency department    07/13/2019  Anion gap resolved- bicarbonate 23, insulin drip discontinued.        hypomagnesemia replaced    Hypophosphatemia- placed   05/30/2017 Unknown    Type 2 diabetes mellitus with diabetic neuropathy, with long-term current use of insulin [E11.40, Z79.4] continue with Levemir 26 units q.h.s. and NovoLog 8 units with meals and low-dose correction scale 05/03/2016 Not Applicable       Chronic    Mixed hyperlipidemia [E78.2] continue with Lipitor 08/12/2014 Yes    Essential hypertension [I10]  amlodipine dose increased to 10 mg daily.  06/06/2013 Yes       Chronic               Disposition- rehab    DVT prophylaxis addressed with:  Brian Wick MD  Attending Staff Physician  [facility]  pager- [phone] [place] - [phone]

## 2019-07-24 NOTE — PROGRESS NOTES
[facility]  Podiatry  Progress Note    Patient Name: Indio Ryder Jr.  MRN: [id number]  Admission Date: 7/12/2019  Hospital Length of Stay: 12 days  Attending Physician: Vicente Wick MD  Primary Care Provider: Lorena Thakur MD     Subjective:     Interval History:   Patient seen at bedside s/p left foot partial 5th ray amputation with irrigation and debridement and application of graft. No acute distress. Denies any pedal pain. Denies calf pain. No new complaints. Dressings to left foot clean, dry, intact. Wound vac to left foot wound intact running @ 125 mmHg continuous pressure. Admits to nausea since ED admission, no vomiting, fever, chills.     7/20 Patient Seen at bedside for dressing change.  Wound vac intact.  Patient denies F/C/N/V.    7/22 Patient Seen at bedside for dressing change.  Wound vac intact.  Patient denies F/C/N/V.    7/24 Patient seen at bedside for dressing change. Wound vac to left foot intact running at 125 mmHg continuous pressure. Patient denies nausea, vomiting, fever, chills. Denies any pain. No acute distress. No new complaints    Follow-up For: Procedure(s) (LRB):  DEBRIDEMENT, FOOT with leftt 5th ray partial amputation with Neox Graft (Left)    Post-Operative Day: 7 Day Post-Op    Scheduled Meds:   amLODIPine  10 mg Oral Daily    atorvastatin  80 mg Oral Daily    ceFAZolin(ANCEF) in D5W 500 mL CONTINUOUS INFUSION  6 g Intravenous Q24H    enoxaparin  40 mg Subcutaneous Daily    insulin aspart U-100  8 Units Subcutaneous TIDWM    insulin detemir U-100  24 Units Subcutaneous QHS    sodium chloride 0.9%  10 mL Intravenous Q6H     Continuous Infusions:    PRN Meds:acetaminophen, calcium carbonate, Dextrose 10% Bolus, Dextrose 10% Bolus, glucagon (human recombinant), glucose, glucose, hydrALAZINE, insulin aspart U-100, ondansetron, promethazine (PHENERGAN) IVPB, sodium chloride 0.9%, sodium chloride 0.9%, Flushing PICC Protocol **AND** sodium chloride  0.9% **AND** sodium chloride 0.9%    Review of Systems   Constitutional: Negative for appetite change, chills and fever.   Eyes: Negative for visual disturbance.   Respiratory: Negative for cough and shortness of breath.    Cardiovascular: Negative for chest pain and leg swelling.   Gastrointestinal: Negative for abdominal pain, constipation, diarrhea, nausea and vomiting.   Genitourinary: Negative for dysuria, frequency and hematuria.   Musculoskeletal: Negative for back pain and myalgias.   Skin: Positive for wound (left foot surgical wound; right foot plantar ulcer). Negative for color change and rash.   Neurological: Negative for dizziness, light-headedness and headaches.   Psychiatric/Behavioral: Negative for agitation and behavioral problems. The patient is not nervous/anxious.    Objective:     Vital Signs (Most Recent):  Temp: 97.7 °F (36.5 °C) (07/24/19 1707)  Pulse: 106 (07/24/19 1707)  Resp: 18 (07/24/19 1707)  BP: (!) 154/88 (07/24/19 1707)  SpO2: 98 % (07/24/19 1707) Vital Signs (24h Range):  Temp:  [96.6 °F (35.9 °C)-98.3 °F (36.8 °C)] 97.7 °F (36.5 °C)  Pulse:  [] 106  Resp:  [16-18] 18  SpO2:  [92 %-99 %] 98 %  BP: (134-163)/(74-94) 154/88     Weight: 91.2 kg (201 lb 1 oz)  Body mass index is 26.53 kg/m².    Foot Exam    General  Orientation: alert and oriented to person, place, and time       Right Foot/Ankle     Inspection and Palpation  Tenderness: none   Swelling: none   Skin Exam: ulcer;     Neurovascular  Dorsalis pedis: 1+  Posterior tibial: 1+  Saphenous nerve sensation: diminished  Tibial nerve sensation: diminished  Superficial peroneal nerve sensation: diminished  Deep peroneal nerve sensation: diminished  Sural nerve sensation: diminished      Left Foot/Ankle      Inspection and Palpation  Ecchymosis: none  Tenderness: none   Swelling: none   Skin Exam: drainage, skin changes and ulcer; (surgical wound 2/2 partial 5th ray amp with irrigation and debridement; proximal plantar sutures  remain intact; serosanguinous drainage noted in wound vac cannister; no edema, no erythema, no signs of infection noted)    Neurovascular  Dorsalis pedis: 1+  Posterior tibial: 1+  Saphenous nerve sensation: diminished  Tibial nerve sensation: diminished  Superficial peroneal nerve sensation: diminished  Deep peroneal nerve sensation: diminished  Sural nerve sensation: diminished          Laboratory:  A1C:   Recent Labs   Lab 07/12/19  1023   HGBA1C >14.0*     CBC:   Recent Labs   Lab 07/24/19  0352   WBC 6.36   RBC 3.77*   HGB 10.4*   HCT 33.8*      MCV 90   MCH 27.6   MCHC 30.8*     CRP:   No results for input(s): CRP in the last 168 hours.  ESR:   No results for input(s): SEDRATE in the last 168 hours.  Wound Cultures:   Recent Labs   Lab 07/12/19  1257 07/14/19  1728 07/17/19  1214   LABAERO STREPTOCOCCUS AGALACTIAE (GROUP B)  Few  Beta-hemolytic streptococci are routinely susceptible to   penicillins,cephalosporins and carbapenems.  Susceptibility testing not routinely performed  *  STAPHYLOCOCCUS AUREUS  Few  * STREPTOCOCCUS AGALACTIAE (GROUP B)  Rare  Beta-hemolytic streptococci are routinely susceptible to   penicillins,cephalosporins and carbapenems.  Susceptibility testing not routinely performed  Skin janes also present  * Skin janes,  no predominant organism     Microbiology Results (last 7 days)     Procedure Component Value Units Date/Time    Fungus culture [[id number]] Collected:  07/17/19 1214    Order Status:  Completed Specimen:  Wound from Foot, Left Updated:  07/24/19 1044     Fungus (Mycology) Culture Culture in progress    Narrative:       Left foot wound    Culture, Anaerobe [[id number]] Collected:  07/17/19 1214    Order Status:  Completed Specimen:  Wound from Foot, Left Updated:  07/23/19 1349     Anaerobic Culture Culture in progress    Narrative:       Left foot wound    Blood culture [[id number]] Collected:  07/15/19 0305    Order Status:  Completed Specimen:  Blood from  Antecubital, Right Updated:  07/20/19 0822     Blood Culture, Routine No growth after 5 days.    Blood culture [[id number]] Collected:  07/15/19 0305    Order Status:  Completed Specimen:  Blood from Antecubital, Right Updated:  07/20/19 0822     Blood Culture, Routine No growth after 5 days.    Aerobic culture [[id number]] Collected:  07/17/19 1214    Order Status:  Completed Specimen:  Wound from Foot, Left Updated:  07/19/19 1106     Aerobic Bacterial Culture Skin janes,  no predominant organism    Narrative:       Left foot wound    Blood culture [[id number]] Collected:  07/14/19 0629    Order Status:  Completed Specimen:  Blood Updated:  07/19/19 1012     Blood Culture, Routine No growth after 5 days.    AFB Culture & Smear [[id number]] Collected:  07/17/19 1214    Order Status:  Completed Specimen:  Wound from Foot, Left Updated:  07/18/19 2127     AFB Culture & Smear Culture in progress     AFB CULTURE STAIN No acid fast bacilli seen.    Narrative:       Left foot wound    Culture, Anaerobe [[id number]]  (Abnormal) Collected:  07/14/19 1728    Order Status:  Completed Specimen:  Wound from Toe, Left Foot Updated:  07/18/19 1407     Anaerobic Culture PEPTONIPHILUS HAREI  Few      Narrative:       Left 5th metatarsal    Blood culture [[id number]] Collected:  07/13/19 0358    Order Status:  Completed Specimen:  Blood Updated:  07/18/19 0612     Blood Culture, Routine No growth after 5 days.    Blood culture [[id number]] Collected:  07/13/19 0358    Order Status:  Completed Specimen:  Blood Updated:  07/18/19 0612     Blood Culture, Routine No growth after 5 days.        Specimen (12h ago, onward)    None          Diagnostic Results:  I have reviewed all pertinent imaging results/findings within the past 24 hours.    Clinical Findings:  Left foot with wound vac application intact; Running at 125 mmHg continuous pressure    Ulcer Location: left lateral plantar foot  Measurements: 10 cm x 4 cm x 2 cm  Periwound:  viable/slight maceration  Drainage: serosanguinous.  Base:  50% granular. 50% fibrin., wound graft intact  Signs of infection: None.     7/24          7/22              7/20            7/18      7/16      7/15 s/p I&D      7/12 post ED bedside debridement      7/12 pre ED bedside debridement          Assessment/Plan:     Foot ulcer  Mr. Ryder is a 49 y.o M who presented to ED with bilateral foot ulcers and constitutional symptoms of nausea and vomiting. Right plantar foot ulcer remains stable. Continue betadine wet to dry dressings right foot. Left foot ulcer was infected upon evaluation in the ED. Pt s/p I&D (DOS:7/14), s/p partial 5th ray amputation with allograft application and wound vac application (DOS: 7/17/19)  Podiatry will continue to monitor.    Anemia  Managed per hospital medicine    Leukocytosis  resolved    Acute osteomyelitis  Indio Ryder Jr. is a 49 y.o. male  S/P Debridement left foot wound (DOS: 7/14 per Dr. 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 Almonte).    - Wound vac changed today, will continue wound vac at 125 mmHg continuous pressure while inpatient. Podiatry will change wound vac while inpatient  -Abx per ID, appreciate recs  - Non weight bearing left foot.  -recommend PT for gait training.    DC Instructions:  Patient is to follow up with podiatry within 10 days of discharge.  Call [phone]  to make an appointment.  Home health/facility to do wound vac dressing changes every  as follows:  Rinse with saline, pat dry, place adaptic over graft, apply wound vac, place vac on 125mmHg slow continuous.  Dress in cast padding and coban          Sepsis  resolved    Type 2 diabetes mellitus with left eye affected by mild nonproliferative retinopathy without macular edema, without long-term current use of insulin  Managed per hospital medicine    Diabetic ketoacidosis without coma associated  with type 2 diabetes mellitus  Managed per hospital medicine & endocrinology     Mixed hyperlipidemia  Managed per hospital medicine    Essential hypertension  Per primary        [facility] Tricia Hi MD  Podiatry  [facility]

## 2019-07-24 NOTE — ASSESSMENT & PLAN NOTE
-  Presented for nausea and vomiting x 4-5 days with associated subjective fevers and chills and new L foot ulcer x ~2 weeks  -  Blood cultures (7/12/19) + group B streptococcus--> repeat NGTD (since 7/13)  -  Wound cultures (7/12/19) + finegoldia magna, group B streptococcus, and MSSA    -  Podiatry consulted--> L foot x-ray concerning for osteomyelitis of partial 5th ray  -  S/p L foot wound debridement on 7/14/19 with subsequent L foot partial 5th ray amputation with debridement and washout and wound vac placement on 7/17/19  -  NWB L foot  -  ID consulted--> recommended IV Cefazolin x 2 weeks from surgery (7/17/19) if cultures negative vs 6 weeks if concern for residual osteomyelitis- cultures pending

## 2019-07-24 NOTE — SUBJECTIVE & OBJECTIVE
Subjective:     Interval History:   Patient seen at bedside s/p left foot partial 5th ray amputation with irrigation and debridement and application of graft. No acute distress. Denies any pedal pain. Denies calf pain. No new complaints. Dressings to left foot clean, dry, intact. Wound vac to left foot wound intact running @ 125 mmHg continuous pressure. Admits to nausea since ED admission, no vomiting, fever, chills.     7/20 Patient Seen at bedside for dressing change.  Wound vac intact.  Patient denies F/C/N/V.    7/22 Patient Seen at bedside for dressing change.  Wound vac intact.  Patient denies F/C/N/V.    7/24 Patient seen at bedside for dressing change. Wound vac to left foot intact running at 125 mmHg continuous pressure. Patient denies nausea, vomiting, fever, chills. Denies any pain. No acute distress. No new complaints    Follow-up For: Procedure(s) (LRB):  DEBRIDEMENT, FOOT with leftt 5th ray partial amputation with Neox Graft (Left)    Post-Operative Day: 7 Day Post-Op    Scheduled Meds:   amLODIPine  10 mg Oral Daily    atorvastatin  80 mg Oral Daily    ceFAZolin(ANCEF) in D5W 500 mL CONTINUOUS INFUSION  6 g Intravenous Q24H    enoxaparin  40 mg Subcutaneous Daily    insulin aspart U-100  8 Units Subcutaneous TIDWM    insulin detemir U-100  24 Units Subcutaneous QHS    sodium chloride 0.9%  10 mL Intravenous Q6H     Continuous Infusions:    PRN Meds:acetaminophen, calcium carbonate, Dextrose 10% Bolus, Dextrose 10% Bolus, glucagon (human recombinant), glucose, glucose, hydrALAZINE, insulin aspart U-100, ondansetron, promethazine (PHENERGAN) IVPB, sodium chloride 0.9%, sodium chloride 0.9%, Flushing PICC Protocol **AND** sodium chloride 0.9% **AND** sodium chloride 0.9%    Review of Systems   Constitutional: Negative for appetite change, chills and fever.   Eyes: Negative for visual disturbance.   Respiratory: Negative for cough and shortness of breath.    Cardiovascular: Negative for chest  pain and leg swelling.   Gastrointestinal: Negative for abdominal pain, constipation, diarrhea, nausea and vomiting.   Genitourinary: Negative for dysuria, frequency and hematuria.   Musculoskeletal: Negative for back pain and myalgias.   Skin: Positive for wound (left foot surgical wound; right foot plantar ulcer). Negative for color change and rash.   Neurological: Negative for dizziness, light-headedness and headaches.   Psychiatric/Behavioral: Negative for agitation and behavioral problems. The patient is not nervous/anxious.    Objective:     Vital Signs (Most Recent):  Temp: 97.7 °F (36.5 °C) (07/24/19 1707)  Pulse: 106 (07/24/19 1707)  Resp: 18 (07/24/19 1707)  BP: (!) 154/88 (07/24/19 1707)  SpO2: 98 % (07/24/19 1707) Vital Signs (24h Range):  Temp:  [96.6 °F (35.9 °C)-98.3 °F (36.8 °C)] 97.7 °F (36.5 °C)  Pulse:  [] 106  Resp:  [16-18] 18  SpO2:  [92 %-99 %] 98 %  BP: (134-163)/(74-94) 154/88     Weight: 91.2 kg (201 lb 1 oz)  Body mass index is 26.53 kg/m².    Foot Exam    General  Orientation: alert and oriented to person, place, and time       Right Foot/Ankle     Inspection and Palpation  Tenderness: none   Swelling: none   Skin Exam: ulcer;     Neurovascular  Dorsalis pedis: 1+  Posterior tibial: 1+  Saphenous nerve sensation: diminished  Tibial nerve sensation: diminished  Superficial peroneal nerve sensation: diminished  Deep peroneal nerve sensation: diminished  Sural nerve sensation: diminished      Left Foot/Ankle      Inspection and Palpation  Ecchymosis: none  Tenderness: none   Swelling: none   Skin Exam: drainage, skin changes and ulcer; (surgical wound 2/2 partial 5th ray amp with irrigation and debridement; proximal plantar sutures remain intact; serosanguinous drainage noted in wound vac cannister; no edema, no erythema, no signs of infection noted)    Neurovascular  Dorsalis pedis: 1+  Posterior tibial: 1+  Saphenous nerve sensation: diminished  Tibial nerve sensation:  diminished  Superficial peroneal nerve sensation: diminished  Deep peroneal nerve sensation: diminished  Sural nerve sensation: diminished          Laboratory:  A1C:   Recent Labs   Lab 07/12/19  1023   HGBA1C >14.0*     CBC:   Recent Labs   Lab 07/24/19  0352   WBC 6.36   RBC 3.77*   HGB 10.4*   HCT 33.8*      MCV 90   MCH 27.6   MCHC 30.8*     CRP:   No results for input(s): CRP in the last 168 hours.  ESR:   No results for input(s): SEDRATE in the last 168 hours.  Wound Cultures:   Recent Labs   Lab 07/12/19  1257 07/14/19  1728 07/17/19  1214   LABAERO STREPTOCOCCUS AGALACTIAE (GROUP B)  Few  Beta-hemolytic streptococci are routinely susceptible to   penicillins,cephalosporins and carbapenems.  Susceptibility testing not routinely performed  *  STAPHYLOCOCCUS AUREUS  Few  * STREPTOCOCCUS AGALACTIAE (GROUP B)  Rare  Beta-hemolytic streptococci are routinely susceptible to   penicillins,cephalosporins and carbapenems.  Susceptibility testing not routinely performed  Skin janes also present  * Skin janes,  no predominant organism     Microbiology Results (last 7 days)     Procedure Component Value Units Date/Time    Fungus culture [[id number]] Collected:  07/17/19 1214    Order Status:  Completed Specimen:  Wound from Foot, Left Updated:  07/24/19 1044     Fungus (Mycology) Culture Culture in progress    Narrative:       Left foot wound    Culture, Anaerobe [[id number]] Collected:  07/17/19 1214    Order Status:  Completed Specimen:  Wound from Foot, Left Updated:  07/23/19 1349     Anaerobic Culture Culture in progress    Narrative:       Left foot wound    Blood culture [[id number]] Collected:  07/15/19 0305    Order Status:  Completed Specimen:  Blood from Antecubital, Right Updated:  07/20/19 0822     Blood Culture, Routine No growth after 5 days.    Blood culture [[id number]] Collected:  07/15/19 0305    Order Status:  Completed Specimen:  Blood from Antecubital, Right Updated:  07/20/19 0822     Blood  Culture, Routine No growth after 5 days.    Aerobic culture [[id number]] Collected:  07/17/19 1214    Order Status:  Completed Specimen:  Wound from Foot, Left Updated:  07/19/19 1106     Aerobic Bacterial Culture Skin janes,  no predominant organism    Narrative:       Left foot wound    Blood culture [[id number]] Collected:  07/14/19 0629    Order Status:  Completed Specimen:  Blood Updated:  07/19/19 1012     Blood Culture, Routine No growth after 5 days.    AFB Culture & Smear [[id number]] Collected:  07/17/19 1214    Order Status:  Completed Specimen:  Wound from Foot, Left Updated:  07/18/19 2127     AFB Culture & Smear Culture in progress     AFB CULTURE STAIN No acid fast bacilli seen.    Narrative:       Left foot wound    Culture, Anaerobe [[id number]]  (Abnormal) Collected:  07/14/19 1728    Order Status:  Completed Specimen:  Wound from Toe, Left Foot Updated:  07/18/19 1407     Anaerobic Culture PEPTONIPHILUS HAREI  Few      Narrative:       Left 5th metatarsal    Blood culture [[id number]] Collected:  07/13/19 0358    Order Status:  Completed Specimen:  Blood Updated:  07/18/19 0612     Blood Culture, Routine No growth after 5 days.    Blood culture [[id number]] Collected:  07/13/19 0358    Order Status:  Completed Specimen:  Blood Updated:  07/18/19 0612     Blood Culture, Routine No growth after 5 days.        Specimen (12h ago, onward)    None          Diagnostic Results:  I have reviewed all pertinent imaging results/findings within the past 24 hours.    Clinical Findings:  Left foot with wound vac application intact; Running at 125 mmHg continuous pressure    Ulcer Location: left lateral plantar foot  Measurements: 10 cm x 4 cm x 2 cm  Periwound: viable/slight maceration  Drainage: serosanguinous.  Base:  50% granular. 50% fibrin., wound graft intact  Signs of infection: None.     7/24          7/22              7/20            7/18      7/16      7/15 s/p I&D      7/12 post ED bedside  debridement      7/12 pre ED bedside debridement

## 2019-07-24 NOTE — PLAN OF CARE
Endocrinology following for management of diabetes type 2. BGs remain out of goal [id number] likely from increased appetite and oral intake.     Plan:   - Increase Detemir to 24 units qHS   - Continue pre-meal Novolog to 8 unit SC qAC   - Continue low dose correction insulin qAC and HS   - Accuchecks qAC and HS      Discharge Recommendations: NATACHA Saini  Endocrinology Fellow

## 2019-07-24 NOTE — SUBJECTIVE & OBJECTIVE
Past Medical History:   Diagnosis Date    Actinomyces infection 1/17/2017    Right foot    Diabetic ketoacidosis without coma associated with type 2 diabetes mellitus 5/30/2017    Diabetic ulcer of right foot associated with type 2 diabetes mellitus 6/3/2015    Essential hypertension 6/6/2013    Group B streptococcal infection 1/13/2017    Mixed hyperlipidemia 8/12/2014    Shoulder impingement 8/12/2014    Subacute osteomyelitis of right foot 1/12/2017    Streptococcus agalactiae, Actinomyces odontolyticus    Traumatic amputation of fifth toe of right foot 7/2/2015    Type 2 diabetes mellitus with diabetic neuropathy, with long-term current use of insulin 5/3/2016    Ulcerative colitis, unspecified      Past Surgical History:   Procedure Laterality Date    AMPUTATION-TOE Right 6/5/2015    Performed by William Manuel [facility] at [facility]    AMPUTATION-TOE and Tendo achilles lengthening Right 1/19/2017    Performed by Ramirez Wilkes DPM at [facility] OR    DEBRIDEMENT, FOOT with leftt 5th ray partial amputation with Neox Graft Left 7/17/2019    Performed by Mai Burrell [facility] at [facility] OR 2ND FLR    DEBRIDEMENT, FOOT, with left 5th ray amputation Left 7/14/2019    Performed by Sis Hickman DPM at [facility] OR 2ND FLR    INCISION AND DRAINAGE (I&D), FOOT Right 1/16/2017    Performed by Ramirez Wilkes [facility] at [facility]    IRRIGATION AND DEBRIDEMENT of right foot ulcer Right 7/14/2019    Performed by Sis Hickman DPM at [facility] OR 2ND FLR    RESECTION-METATARSAL HEAD Right 1/16/2017    Performed by Ramirez Wilkes [facility] at [facility] OR    TOE AMPUTATION Right 06/05/2015    TOE AMPUTATION Right 01/19/2017     Review of patient's allergies indicates:  No Known Allergies    Scheduled Medications:    amLODIPine  10 mg Oral Daily    atorvastatin  80 mg Oral Daily    ceFAZolin(ANCEF) in D5W 500 mL CONTINUOUS INFUSION  6 g Intravenous Q24H    enoxaparin  40 mg Subcutaneous Daily    insulin aspart U-100  8 Units  Subcutaneous TIDWM    insulin detemir U-100  26 Units Subcutaneous QHS    sodium chloride 0.9%  10 mL Intravenous Q6H       PRN Medications: acetaminophen, calcium carbonate, Dextrose 10% Bolus, Dextrose 10% Bolus, glucagon (human recombinant), glucose, glucose, hydrALAZINE, insulin aspart U-100, ondansetron, promethazine (PHENERGAN) IVPB, sodium chloride 0.9%, sodium chloride 0.9%, Flushing PICC Protocol **AND** sodium chloride 0.9% **AND** sodium chloride 0.9%    Family History     Problem Relation (Age of Onset)    Cancer Mother    Cataracts Father    Diabetes Mother, Sister    Hypertension Mother, Father, Maternal Aunt    No Known Problems Brother, Sister, Sister, Maternal Uncle, Paternal Aunt, Paternal Uncle, Maternal Grandmother, Maternal Grandfather, Paternal Grandmother, Paternal Grandfather        Tobacco Use    Smoking status: Never Smoker    Smokeless tobacco: Never Used   Substance and Sexual Activity    Alcohol use: No    Drug use: No    Sexual activity: Yes     Partners: Female     Birth control/protection: Condom     Review of Systems   Constitutional: Positive for activity change. Negative for chills and fever.   HENT: Negative for drooling, hearing loss, trouble swallowing and voice change.    Eyes: Negative for pain and visual disturbance.   Respiratory: Negative for cough, shortness of breath and wheezing.    Cardiovascular: Positive for leg swelling. Negative for chest pain and palpitations.   Gastrointestinal: Negative for abdominal pain, nausea and vomiting.   Genitourinary: Negative for difficulty urinating and flank pain.   Musculoskeletal: Positive for gait problem. Negative for back pain, myalgias and neck pain.   Skin: Positive for wound. Negative for rash.   Neurological: Positive for numbness. Negative for dizziness and headaches.   Psychiatric/Behavioral: Negative for agitation and hallucinations. The patient is not nervous/anxious.      Objective:     Vital Signs (Most  Recent):  Temp: 98 °F (36.7 °C) (07/24/19 0838)  Pulse: 108 (07/24/19 1125)  Resp: 16 (07/24/19 0838)  BP: (!) 144/94 (07/24/19 0838)  SpO2: (!) 93 % (07/24/19 0838)    Vital Signs (24h Range):  Temp:  [96.6 °F (35.9 °C)-98.4 °F (36.9 °C)] 98 °F (36.7 °C)  Pulse:  [] 108  Resp:  [16-18] 16  SpO2:  [92 %-98 %] 93 %  BP: (134-163)/(74-94) 144/94     Body mass index is 26.53 kg/m².    Physical Exam   Constitutional: He is oriented to person, place, and time. He appears well-developed and well-nourished. No distress.   HENT:   Head: Normocephalic and atraumatic.   Right Ear: External ear normal.   Left Ear: External ear normal.   Nose: Nose normal.   Eyes: Right eye exhibits no discharge. Left eye exhibits no discharge. No scleral icterus.   Neck: Normal range of motion.   Cardiovascular: Normal rate and intact distal pulses.   Pulmonary/Chest: Effort normal. No respiratory distress. He has no wheezes.   Abdominal: Soft. He exhibits no distension. There is no tenderness.   Musculoskeletal: He exhibits edema. He exhibits no tenderness.   R foot with 1st and 5th amputations- R foot plantar dressing intact  L foot with dressing and wound vac intact   Neurological: He is alert and oriented to person, place, and time. He exhibits normal muscle tone.   Skin: Skin is warm and dry. No rash noted.   Psychiatric: He has a normal mood and affect. His behavior is normal.   Vitals reviewed.    NEUROLOGICAL EXAMINATION:     MENTAL STATUS   Oriented to person, place, and time.       Diagnostic Results:   Labs: Reviewed  X-Ray: Reviewed  US: Reviewed  MRI: Reviewed

## 2019-07-24 NOTE — PLAN OF CARE
Problem: Adult Inpatient Plan of Care  Goal: Plan of Care Review  Outcome: Ongoing (interventions implemented as appropriate)  Patient compliant with diabetic orders, monitoring blood sugars. No new pain, discomfort or complaints. Family and patient frustrated about discharge process, updated contact information in chart for sister. , Cynthia, is the patient preferred contact for discharge planning. Wound vac dressing changed by podiatry, no issues. Fall precautions in place.

## 2019-07-24 NOTE — ASSESSMENT & PLAN NOTE
-  Chronic R diabetic foot ulcer  -  Admitted with new L foot ulcer x ~2 weeks  -  See bacteremia 2/2 GBS

## 2019-07-24 NOTE — PLAN OF CARE
Problem: Adult Inpatient Plan of Care  Goal: Plan of Care Review  Outcome: Ongoing (interventions implemented as appropriate)  Pt AAOx4, VSS, afebrile. Wound vac to left foot draining, dressing C/D/I. Pt complained of soreness to right arm below site of newly placed PICC line. Jaden Weathers PA-C notified and aware. Bilateral radial pulses regular, no swelling or warmth on right arm. Tylenol given for pain. Up with assist to bathroom. BM x1. CBG AC/HS, coverage administered per order. Continuous Ancef infusing through PICC line. No complaints at this time. WCTM.

## 2019-07-24 NOTE — PLAN OF CARE
Problem: Physical Therapy Goal  Goal: Physical Therapy Goal  Goals to be met by: 2019    Patient will increase functional independence with mobility by performin. Supine to sit with Modified [place]  2. Sit to supine with Modified [place]  3. Sit to stand transfer with Supervision  4. Gait  x 20 feet with Stand-by Assistance using LRAD  5. Lower extremity exercise program x15 reps per handout, with independence   Outcome: Ongoing (interventions implemented as appropriate)  Eval completed and POC established     Ravinder Stringer, PT, DPT  2019

## 2019-07-24 NOTE — ASSESSMENT & PLAN NOTE
Indio Ryder Jr. is a 49 y.o. male  S/P Debridement left foot wound (DOS: 7/14 per Dr. 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 Almonte).    - Wound vac changed today, will continue wound vac at 125 mmHg continuous pressure while inpatient. Podiatry will change wound vac while inpatient  -Abx per ID, appreciate recs  - Non weight bearing left foot.  -recommend PT for gait training.    DC Instructions:  Patient is to follow up with podiatry within 10 days of discharge.  Call [phone]  to make an appointment.  Home health/facility to do wound vac dressing changes every  as follows:  Rinse with saline, pat dry, place adaptic over graft, apply wound vac, place vac on 125mmHg slow continuous.  Dress in cast padding and coban

## 2019-07-24 NOTE — PT/OT/SLP EVAL
Physical Therapy Evaluation    Patient Name:  Indio Ryder Jr.   MRN:  [id number]    Recommendations:     Discharge Recommendations:  rehabilitation facility   Discharge Equipment Recommendations: walker, rolling   Barriers to discharge: None    Assessment:     Indio Ryder Jr. is a 49 y.o. male admitted with a medical diagnosis of Bacteremia due to group B Streptococcus.  He presents with the following impairments/functional limitations:  impaired self care skills, impaired endurance, impaired functional mobilty, gait instability, orthopedic precautions. Pt limited on this date by LLE NWB status. Pt reports he has been ambulating within the room weight bearing to heel without AD. Pt educated to maintain LLE NWB per MD orders utilizing RW ordered to room. Pt would benefit from continued skilled acute PT 3x/wk to improve functional mobility.  Recommending pt receive PT services in Rehab setting following discharge from hospital once medically cleared.     Rehab Prognosis: Good; patient would benefit from acute skilled PT services to address these deficits and reach maximum level of function.    Recent Surgery: Procedure(s) (LRB):  DEBRIDEMENT, FOOT with leftt 5th ray partial amputation with Neox Graft (Left) 7 Days Post-Op    Plan:     During this hospitalization, patient to be seen   to address the identified rehab impairments via gait training, therapeutic activities, therapeutic exercises, neuromuscular re-education and progress toward the following goals:    · Plan of Care Expires:  08/24/19    Subjective     Chief Complaint: 7/10 in RUE   Patient/Family Comments/goals: pt willing to participate with therapy on this date.    Pain/Comfort:  · Pain Rating 1: 7/10  · Location - Side 1: Left  · Location 1: arm    Patients cultural, spiritual, [employer] conflicts given the current situation: no    Living Environment:  Pt will d/c home with sister in [facility] with 1 EARLINE.   Prior to admission, patients level of  function was Ind.  Equipment used at home: none.  DME owned (not currently used): none.  Upon discharge, patient will have assistance from sister.    Objective:     Communicated with RN prior to session.  Patient found supine with PICC line, wound vac  upon PT entry to room.    General Precautions: Standard, fall   Orthopedic Precautions:LLE non weight bearing   Braces: N/A     Exams:  · RLE ROM: WFL  · RLE Strength: WFL  · LLE ROM: WFL  · LLE Strength: WFL    Functional Mobility:  · Bed Mobility:     · Rolling Left:  supervision  · Scooting: supervision  · Supine to Sit: supervision  · Transfers:     · Sit to Stand:  contact guard assistance with rolling walker  · Bed to Chair: contact guard assistance with  rolling walker  using  Step Transfer  · Gait: ~8ft with RW CGA utilizing hop to gait pattern   · Balance: Sitting: Sup.     Standing: CGA      Therapeutic Activities and Exercises:   - Pt educated on:   -PT roles, expectations, and POC    -Safety with mobility   -Benefits of OOB activities to increase strength and functional mobility    -Maintaining LLE NWB status    -Discharge recommendations     AM-PAC 6 CLICK MOBILITY  Total Score:18     Patient left up in chair with call button in reach.    GOALS:   Multidisciplinary Problems     Physical Therapy Goals        Problem: Physical Therapy Goal    Goal Priority Disciplines Outcome Goal Variances Interventions   Physical Therapy Goal     PT, PT/OT Ongoing (interventions implemented as appropriate)     Description:  Goals to be met by: 2019    Patient will increase functional independence with mobility by performin. Supine to sit with Modified [place]  2. Sit to supine with Modified [place]  3. Sit to stand transfer with Supervision  4. Gait  x 20 feet with Stand-by Assistance using LRAD  5. Lower extremity exercise program x15 reps per handout, with independence                     History:     Past Medical History:   Diagnosis Date     Actinomyces infection 1/17/2017    Right foot    Diabetic ketoacidosis without coma associated with type 2 diabetes mellitus 5/30/2017    Diabetic ulcer of right foot associated with type 2 diabetes mellitus 6/3/2015    Essential hypertension 6/6/2013    Group B streptococcal infection 1/13/2017    Mixed hyperlipidemia 8/12/2014    Shoulder impingement 8/12/2014    Subacute osteomyelitis of right foot 1/12/2017    Streptococcus agalactiae, Actinomyces odontolyticus    Traumatic amputation of fifth toe of right foot 7/2/2015    Type 2 diabetes mellitus with diabetic neuropathy, with long-term current use of insulin 5/3/2016    Ulcerative colitis, unspecified        Past Surgical History:   Procedure Laterality Date    AMPUTATION-TOE Right 6/5/2015    Performed by William Manuel D[facility] at [facility] OR    AMPUTATION-TOE and Tendo achilles lengthening Right 1/19/2017    Performed by Ramirez Wilkes DPM at [facility] OR    DEBRIDEMENT, FOOT with leftt 5th ray partial amputation with Neox Graft Left 7/17/2019    [facility]formed by [facility]rell [facility] 2ND [facility]LR    DEBRIDEMENT, FOOT, with left 5th ray amputation Left 7/14/2019    Performed by Sis Hickman DPM at [facility] OR 2ND [facility]LR    INCISION AND DRAINAGE (I&D), FOOT Right 1/16/2017    Performed by Ramirez Wilkes D[facility] at [facility] OR    IRRIGATION AND DEBRIDEMENT of right foot ulcer Right 7/14/2019    Performed by Sis Hickman DPM at [facility] OR 2ND FLR    RESECTION-METATARSAL HEAD Right 1/16/2017    Performed by Ramirez Wilkes DPM at [facility] OR    TOE AMPUTATION Right 06/05/2015    TOE AMPUTATION Right 01/19/2017       Time Tracking:     PT Received On: 07/24/19  PT Start Time: 1051     PT Stop Time: 1106  PT Total Time (min): 15 min     Billable Minutes: Evaluation 15      Rommel Stringer, PT  07/24/2019

## 2019-07-24 NOTE — PLAN OF CARE
Problem: Occupational Therapy Goal  Goal: Occupational Therapy Goal  Goals to be met by: 8/3/19    Patient will increase functional independence with ADLs by performing:    UE Dressing with Supervision.  LE Dressing with Stand-by Assistance.  Grooming while standing at sink with Stand-by Assistance.  Toileting from toilet with Stand-by Assistance for hygiene and clothing management.   Supine to sit with [place].  Toilet transfer to toilet with Stand-by Assistance.    Outcome: Ongoing (interventions implemented as appropriate)  Evaluation completed. Initiate POC.   Mica Ray OT  7/24/2019

## 2019-07-24 NOTE — CONSULTS
Inpatient consult to Physical Medicine Rehab  Consult performed by: RONNY Escobar  Consult ordered by: Vicente Wick MD  Reason for consult: rehab evaluation       Consult received.  Reviewed patient history and current admission.  PT and OT evaluations pending.  Rehab team following.  Full consult to follow.    JESUS Adams, VAP-C  Physical Medicine & Rehabilitation   07/24/2019  Spectralink: [id number]

## 2019-07-24 NOTE — HOSPITAL COURSE
7/23/19:  Therapy evaluations pending.   7/24/19:  Evaluated by PT and OT.  Bed mobility SV.  Sit to stand and transfers CGA with RW.  Ambulated ~8 ft CGA with RW.  MINNA John.  7/25/19:  No therapy.  Insurance denied rehab; peer to peer pending.   7/26/19:  Participated with PT and OT.  Bed mobility (I).  Sit to stand and transfers SBA-CGA with RW.  Ambulated 22 ft CGA with RW.  UBD MARQUITA.

## 2019-07-25 LAB
ALBUMIN SERPL BCP-MCNC: 2.5 G/DL (ref 3.5–5.2)
ALBUMIN SERPL BCP-MCNC: 2.8 G/DL (ref 3.5–5.2)
ALP SERPL-CCNC: 119 U/L (ref 55–135)
ALP SERPL-CCNC: 134 U/L (ref 55–135)
ALT SERPL W/O P-5'-P-CCNC: 5 U/L (ref 10–44)
ALT SERPL W/O P-5'-P-CCNC: 5 U/L (ref 10–44)
ANION GAP SERPL CALC-SCNC: 11 MMOL/L (ref 8–16)
ANION GAP SERPL CALC-SCNC: 11 MMOL/L (ref 8–16)
AST SERPL-CCNC: 16 U/L (ref 10–40)
AST SERPL-CCNC: 18 U/L (ref 10–40)
BASOPHILS # BLD AUTO: 0.03 K/UL (ref 0–0.2)
BASOPHILS NFR BLD: 0.4 % (ref 0–1.9)
BILIRUB SERPL-MCNC: 0.2 MG/DL (ref 0.1–1)
BILIRUB SERPL-MCNC: 0.2 MG/DL (ref 0.1–1)
BUN SERPL-MCNC: 11 MG/DL (ref 6–20)
BUN SERPL-MCNC: 9 MG/DL (ref 6–20)
CALCIUM SERPL-MCNC: 9.2 MG/DL (ref 8.7–10.5)
CALCIUM SERPL-MCNC: 9.7 MG/DL (ref 8.7–10.5)
CHLORIDE SERPL-SCNC: 97 MMOL/L (ref 95–110)
CHLORIDE SERPL-SCNC: 98 MMOL/L (ref 95–110)
CO2 SERPL-SCNC: 25 MMOL/L (ref 23–29)
CO2 SERPL-SCNC: 30 MMOL/L (ref 23–29)
CREAT SERPL-MCNC: 1 MG/DL (ref 0.5–1.4)
CREAT SERPL-MCNC: 1.1 MG/DL (ref 0.5–1.4)
DIFFERENTIAL METHOD: ABNORMAL
EOSINOPHIL # BLD AUTO: 0.1 K/UL (ref 0–0.5)
EOSINOPHIL NFR BLD: 0.9 % (ref 0–8)
ERYTHROCYTE [DISTWIDTH] IN BLOOD BY AUTOMATED COUNT: 13.2 % (ref 11.5–14.5)
EST. GFR  (AFRICAN AMERICAN): >60 ML/MIN/1.73 M^2
EST. GFR  (AFRICAN AMERICAN): >60 ML/MIN/1.73 M^2
EST. GFR  (NON AFRICAN AMERICAN): >60 ML/MIN/1.73 M^2
EST. GFR  (NON AFRICAN AMERICAN): >60 ML/MIN/1.73 M^2
GLUCOSE SERPL-MCNC: 260 MG/DL (ref 70–110)
GLUCOSE SERPL-MCNC: 455 MG/DL (ref 70–110)
HCT VFR BLD AUTO: 35.7 % (ref 40–54)
HGB BLD-MCNC: 11.2 G/DL (ref 14–18)
IMM GRANULOCYTES # BLD AUTO: 0.02 K/UL (ref 0–0.04)
IMM GRANULOCYTES NFR BLD AUTO: 0.2 % (ref 0–0.5)
LYMPHOCYTES # BLD AUTO: 2.2 K/UL (ref 1–4.8)
LYMPHOCYTES NFR BLD: 26.7 % (ref 18–48)
MAGNESIUM SERPL-MCNC: 1.8 MG/DL (ref 1.6–2.6)
MCH RBC QN AUTO: 28.1 PG (ref 27–31)
MCHC RBC AUTO-ENTMCNC: 31.4 G/DL (ref 32–36)
MCV RBC AUTO: 90 FL (ref 82–98)
MONOCYTES # BLD AUTO: 0.6 K/UL (ref 0.3–1)
MONOCYTES NFR BLD: 7.7 % (ref 4–15)
NEUTROPHILS # BLD AUTO: 5.3 K/UL (ref 1.8–7.7)
NEUTROPHILS NFR BLD: 64.1 % (ref 38–73)
NRBC BLD-RTO: 0 /100 WBC
PHOSPHATE SERPL-MCNC: 2.1 MG/DL (ref 2.7–4.5)
PLATELET # BLD AUTO: 340 K/UL (ref 150–350)
PMV BLD AUTO: 10.8 FL (ref 9.2–12.9)
POCT GLUCOSE: 220 MG/DL (ref 70–110)
POCT GLUCOSE: 237 MG/DL (ref 70–110)
POCT GLUCOSE: 274 MG/DL (ref 70–110)
POCT GLUCOSE: 304 MG/DL (ref 70–110)
POCT GLUCOSE: 318 MG/DL (ref 70–110)
POTASSIUM SERPL-SCNC: 3.7 MMOL/L (ref 3.5–5.1)
POTASSIUM SERPL-SCNC: 4.1 MMOL/L (ref 3.5–5.1)
PROT SERPL-MCNC: 7.4 G/DL (ref 6–8.4)
PROT SERPL-MCNC: 8.2 G/DL (ref 6–8.4)
RBC # BLD AUTO: 3.99 M/UL (ref 4.6–6.2)
SODIUM SERPL-SCNC: 134 MMOL/L (ref 136–145)
SODIUM SERPL-SCNC: 138 MMOL/L (ref 136–145)
WBC # BLD AUTO: 8.19 K/UL (ref 3.9–12.7)

## 2019-07-25 PROCEDURE — A4216 STERILE WATER/SALINE, 10 ML: HCPCS | Performed by: HOSPITALIST

## 2019-07-25 PROCEDURE — 99232 SBSQ HOSP IP/OBS MODERATE 35: CPT | Mod: ,,, | Performed by: NURSE PRACTITIONER

## 2019-07-25 PROCEDURE — 25000003 PHARM REV CODE 250: Performed by: PHYSICIAN ASSISTANT

## 2019-07-25 PROCEDURE — 84100 ASSAY OF PHOSPHORUS: CPT

## 2019-07-25 PROCEDURE — 85025 COMPLETE CBC W/AUTO DIFF WBC: CPT

## 2019-07-25 PROCEDURE — 20600001 HC STEP DOWN PRIVATE ROOM

## 2019-07-25 PROCEDURE — 83735 ASSAY OF MAGNESIUM: CPT

## 2019-07-25 PROCEDURE — 99233 SBSQ HOSP IP/OBS HIGH 50: CPT | Mod: 24,,, | Performed by: PODIATRIST

## 2019-07-25 PROCEDURE — 63600175 PHARM REV CODE 636 W HCPCS: Performed by: HOSPITALIST

## 2019-07-25 PROCEDURE — 99232 SBSQ HOSP IP/OBS MODERATE 35: CPT | Mod: ,,, | Performed by: HOSPITALIST

## 2019-07-25 PROCEDURE — 99233 PR SUBSEQUENT HOSPITAL CARE,LEVL III: ICD-10-PCS | Mod: 24,,, | Performed by: PODIATRIST

## 2019-07-25 PROCEDURE — 99232 PR SUBSEQUENT HOSPITAL CARE,LEVL II: ICD-10-PCS | Mod: ,,, | Performed by: NURSE PRACTITIONER

## 2019-07-25 PROCEDURE — 63600175 PHARM REV CODE 636 W HCPCS: Performed by: PHYSICIAN ASSISTANT

## 2019-07-25 PROCEDURE — 25000003 PHARM REV CODE 250: Performed by: HOSPITALIST

## 2019-07-25 PROCEDURE — 99232 PR SUBSEQUENT HOSPITAL CARE,LEVL II: ICD-10-PCS | Mod: ,,, | Performed by: HOSPITALIST

## 2019-07-25 PROCEDURE — 80053 COMPREHEN METABOLIC PANEL: CPT

## 2019-07-25 PROCEDURE — 36415 COLL VENOUS BLD VENIPUNCTURE: CPT

## 2019-07-25 RX ORDER — SODIUM,POTASSIUM PHOSPHATES 280-250MG
2 POWDER IN PACKET (EA) ORAL
Status: DISPENSED | OUTPATIENT
Start: 2019-07-25 | End: 2019-07-26

## 2019-07-25 RX ORDER — INSULIN ASPART 100 [IU]/ML
9 INJECTION, SOLUTION INTRAVENOUS; SUBCUTANEOUS
Status: DISCONTINUED | OUTPATIENT
Start: 2019-07-25 | End: 2019-07-26

## 2019-07-25 RX ADMIN — POTASSIUM & SODIUM PHOSPHATES POWDER PACK 280-160-250 MG 2 PACKET: 280-160-250 PACK at 12:07

## 2019-07-25 RX ADMIN — Medication 10 ML: at 12:07

## 2019-07-25 RX ADMIN — ENOXAPARIN SODIUM 40 MG: 100 INJECTION SUBCUTANEOUS at 04:07

## 2019-07-25 RX ADMIN — INSULIN ASPART 9 UNITS: 100 INJECTION, SOLUTION INTRAVENOUS; SUBCUTANEOUS at 11:07

## 2019-07-25 RX ADMIN — Medication 10 ML: at 06:07

## 2019-07-25 RX ADMIN — INSULIN ASPART 1 UNITS: 100 INJECTION, SOLUTION INTRAVENOUS; SUBCUTANEOUS at 10:07

## 2019-07-25 RX ADMIN — CALCIUM CARBONATE (ANTACID) CHEW TAB 500 MG 1000 MG: 500 CHEW TAB at 04:07

## 2019-07-25 RX ADMIN — INSULIN ASPART 2 UNITS: 100 INJECTION, SOLUTION INTRAVENOUS; SUBCUTANEOUS at 11:07

## 2019-07-25 RX ADMIN — INSULIN ASPART 2 UNITS: 100 INJECTION, SOLUTION INTRAVENOUS; SUBCUTANEOUS at 04:07

## 2019-07-25 RX ADMIN — POTASSIUM & SODIUM PHOSPHATES POWDER PACK 280-160-250 MG 2 PACKET: 280-160-250 PACK at 10:07

## 2019-07-25 RX ADMIN — INSULIN ASPART 8 UNITS: 100 INJECTION, SOLUTION INTRAVENOUS; SUBCUTANEOUS at 07:07

## 2019-07-25 RX ADMIN — POTASSIUM & SODIUM PHOSPHATES POWDER PACK 280-160-250 MG 2 PACKET: 280-160-250 PACK at 04:07

## 2019-07-25 RX ADMIN — CEFAZOLIN 6 G: 1 INJECTION, POWDER, FOR SOLUTION INTRAMUSCULAR; INTRAVENOUS at 01:07

## 2019-07-25 RX ADMIN — ATORVASTATIN CALCIUM 80 MG: 20 TABLET, FILM COATED ORAL at 11:07

## 2019-07-25 RX ADMIN — AMLODIPINE BESYLATE 10 MG: 10 TABLET ORAL at 11:07

## 2019-07-25 RX ADMIN — INSULIN ASPART 9 UNITS: 100 INJECTION, SOLUTION INTRAVENOUS; SUBCUTANEOUS at 04:07

## 2019-07-25 RX ADMIN — INSULIN ASPART 4 UNITS: 100 INJECTION, SOLUTION INTRAVENOUS; SUBCUTANEOUS at 07:07

## 2019-07-25 NOTE — PROGRESS NOTES
Progress Note  Hospital Medicine    Admit Date: 7/12/2019  Length of Stay:  LOS: 13 days     SUBJECTIVE:         Follow-up For:  Bacteremia due to group B Streptococcus    HPI/Interval history (See H&P for complete P,F,SHx) :   Over view  Indio Ryder Jr. is a 49 y.o. male with a PMH of T2DM (poorly  controlled, with long term insulin use) and HTN who presented to the ED on 7/12/2019 diagnosed with  Vomiting (vomiting for past 4 days, denies sick contacts)   Oriented x3 accompanied by daughter at the bedside.  Mentions that he wears lower extremity stockings for edema. Reportedly formed a new ulcer on the left lateral aspect of the foot about 10 days back.  had 4-5 days of nausea and vomiting. Vomiting is non-bloody, No abdominal pain..  It occurs immediately after eating and he has not been able to keep anything down. Associated with fevers and chills but no objective temperature measurement.  At baseline only takes insulin once a day stop taking more than a week because of nausea and vomiting and poor oral intake.  Does not check fingersticks regularly at home. recently lost his insurance and has been unable to follow up with wound care.  His last follow-up with primary care provider and podiatrist was about 2 years.  He has been managing his right foot ulcer at home with dressing but over the past 2 weeks an ulcer on his left foot has developed. He denies any pain or injury- has chronic numbness of bilateral lower extremities and hands from diabetic neuropathy.  Found to be in DKA in the emergency department with beta hydroxybutyrate elevated at 4.1.  Started on insulin drip    07/13/2019   Temperature of 101.5 last night.  Anion gap resolved- bicarbonate 23, insulin drip discontinued.  Started on Levemir 23 units q.a.m. 7 units as part t.i.d. with meals with low-dose sliding scale.  Diabetic diet and NPO from midnight for possible intervention.  MRI of the foot- Ulceration involving the lateral plantar  aspect of the distal left foot, with findings concerning for exposed bone involving the distal aspect of the 5th metatarsal and proximal phalange of the 5th toe.  There are associated changes concerning for osteomyelitis involving the distal aspect of the 5th metatarsal and entirety of the right toe.  There is osseous hyperemia involving the proximal phalange of the 2nd toe, early changes of osteomyelitis are a consideration . aerobic culture  and blood culture with Group B strep. discontinued vancomycin       07/14/2019  Had urinary retention of 800 mL but spontaneously voided.  Started on transitional insulin drip as NPO from midnight for OR today.  Hypokalemia repeat.  Blood cultures daily until clear.  Aerobic cultures with group B Streptococcus and Staph aureus.  Added vancomycin ( monitor for toxicity)    07/15/2019   s/p I&D and 5th ray amputation in the OR  on  07/14/2019.  ALISSA ordered to evaluate vascular status intraoperative Cultures pending . clean margin cultures and pathology pending. Deescalated from vanc and zosyn to cefazolin 6 gram IV cont infusion.  blood cultures from 07/13/2019 no growth.  Continues to have nausea vomiting since unable to tolerate anything by mouth.  Started on IV hydration and Reglan    07/23/2019  underwent repeat I&D and left partial 5th ray amputation 7/17.   Per OP note,  purulent drainage was expressed proximal to distal from the level of the plantar aspect of the left calcaneus to the open wound on the plantar midfoot. Purulent drainage was also manually expressed from the plantar distal aspect of the forefoot. Cultures of this were sent.   A small portion of the distal metatarsal was reported as sent as a clean margin to Micro and pathology.  The wound was partially closed, Neox allograft and wound Vac placed.  Repeat blood cultures remain NGTD.   ABIs without significant PAD.   Tolerating oral diet without nausea and vomiting.  Status post PICC line placement on  07/23/2019.  Wound VAC in place to left foot.  PT evaluation, crutches ordered per Podiatry.  No weight-bearing to left foot    Interval history  Awaiting insurance authorization for rehab placement    Review of Systems: List if applicable  Constitutional: Positive for activity change, appetite change (Poor appetite for the last and), chills, fatigue, fever ( weight loss) and unexpected weight change.   HENT: Negative for congestion, ear discharge, ear pain, facial swelling and sore throat.    Eyes: Negative for pain, discharge and itching.   Respiratory: Positive for shortness of breath ( at rest and exertion ). Negative for cough, chest tightness and wheezing.    Cardiovascular: Positive for leg swelling ( chronic). Negative for chest pain and palpitations.   Gastrointestinal: Positive for vomiting improved. Negative for abdominal distention, abdominal pain, blood in stool, constipation and diarrhea.   Endocrine: Negative for cold intolerance.   Genitourinary: Negative for dysuria, hematuria and urgency.   Musculoskeletal: Negative for arthralgias, gait problem, myalgias, neck pain and neck stiffness.   Skin: Negative for color change, pallor and rash.   Allergic/Immunologic: Negative for environmental allergies.   Neurological: Positive for weakness and numbness ( bilateral lower extremities and hands attributes to diabetic neuropathy). Negative for dizziness, syncope, light-headedness and headaches.   Hematological: Negative for adenopathy.   Psychiatric/Behavioral: Negative for agitation, behavioral problems and confusion.     OBJECTIVE:     Vital Signs Range (Last 24H):  Temp:  [96.8 °F (36 °C)-98.8 °F (37.1 °C)]   Pulse:  [101-111]   Resp:  [18-20]   BP: (126-154)/(79-97)   SpO2:  [94 %-98 %]     Physical Exam:  Constitutional: He is oriented to person, place, and time. He appears well-developed and well-nourished.   HENT:   Head: Normocephalic and atraumatic.   Mouth/Throat: Oropharynx is clear and moist. No  oropharyngeal exudate.   Eyes: Pupils are equal, round, and reactive to light. Conjunctivae and EOM are normal. Right eye exhibits no discharge. Left eye exhibits no discharge. No scleral icterus.   Neck: Normal range of motion. Neck supple. No JVD present. No tracheal deviation present. No thyromegaly present.   Cardiovascular: Normal rate, regular rhythm and normal heart sounds. Exam reveals no gallop and no friction rub.   No murmur heard.  Pulmonary/Chest: Effort normal and breath sounds normal. No stridor. No respiratory distress. He has no wheezes. He has no rales.   Abdominal: Soft. Bowel sounds are normal. He exhibits no distension and no mass. There is no tenderness. There is no guarding.   Musculoskeletal: Normal range of motion. He exhibits edema.   Lymphadenopathy:     He has no cervical adenopathy.   Neurological: He is oriented to person, place, and time. No cranial nerve deficit.   Able to move upper and lower extremities without limitation   Skin: Skin is warm and dry.   Left foot:  With wound VAC in place.     Right foot: 1.5cm clean based ulcer at base of right 5th MTP joint. Right great toe and second toe surgically amputated.     Psychiatric: He has a normal mood and affect. His behavior is normal.   Nursing note and vitals reviewed    Medications:  Medication list was reviewed and changes noted under Assessment/Plan.      Current Facility-Administered Medications:     acetaminophen tablet 650 mg, 650 mg, Oral, Q6H PRN, Vicente Wick MD, 650 mg at 07/23/19 1938    amLODIPine tablet 10 mg, 10 mg, Oral, Daily, Vicente Wick MD, 10 mg at 07/25/19 1157    atorvastatin tablet 80 mg, 80 mg, Oral, Daily, Vicente Wick MD, 80 mg at 07/25/19 1157    calcium carbonate 200 mg calcium (500 mg) chewable tablet 1,000 mg, 1,000 mg, Oral, BID PRN, Antonio Tate PA-C, 1,000 mg at 07/14/19 1850    ceFAZolin (ANCEF) 6 g in dextrose 5 % 500 mL CONTINUOUS INFUSION, 6 g, Intravenous, Q24H, Tatyana  RAMILA Camacho, 6 g at 07/24/19 1320    dextrose 10% (D10W) Bolus, 12.5 g, Intravenous, PRN, Nakul Patel MD    dextrose 10% (D10W) Bolus, 25 g, Intravenous, PRN, Nakul Patel MD    enoxaparin injection 40 mg, 40 mg, Subcutaneous, Daily, Vicente Wick MD, 40 mg at 07/24/19 1706    glucagon (human recombinant) injection 1 mg, 1 mg, Intramuscular, PRN, Nakul Patel MD    glucose chewable tablet 16 g, 16 g, Oral, PRN, Nakul Patel MD    glucose chewable tablet 24 g, 24 g, Oral, PRN, Nakul Patel MD    hydrALAZINE tablet 25 mg, 25 mg, Oral, Q8H PRN, Vicente Wick MD, 25 mg at 07/23/19 0804    insulin aspart U-100 pen 0-5 Units, 0-5 Units, Subcutaneous, QID (AC + HS) PRN, Nakul Patel MD, 2 Units at 07/25/19 1152    insulin aspart U-100 pen 9 Units, 9 Units, Subcutaneous, TIDWM, Javid Saini MD, 9 Units at 07/25/19 1152    insulin detemir U-100 pen 28 Units, 28 Units, Subcutaneous, QHS, Javid Saini MD    ondansetron injection 8 mg, 8 mg, Intravenous, Q8H PRN, Zeynep Duke PA-C    potassium, sodium phosphates 280-160-250 mg packet 2 packet, 2 packet, Oral, QID (AC & HS), Vicente Wick MD, 2 packet at 07/25/19 1202    promethazine (PHENERGAN) 12.5 mg in dextrose 5 % 50 mL IVPB, 12.5 mg, Intravenous, Q6H PRN, Zeynep Duke PA-C, Last Rate: 150 mL/hr at 07/18/19 0012, 12.5 mg at 07/18/19 0012    sodium chloride 0.9% flush 10 mL, 10 mL, Intravenous, PRN, Vicente Wick MD    sodium chloride 0.9% flush 10 mL, 10 mL, Intravenous, PRN, Mariela Damon MD    Flushing PICC Protocol, , , Until Discontinued **AND** sodium chloride 0.9% flush 10 mL, 10 mL, Intravenous, Q6H, 10 mL at 07/25/19 1202 **AND** sodium chloride 0.9% flush 10 mL, 10 mL, Intravenous, PRN, Vicente Wick MD    acetaminophen, calcium carbonate, Dextrose 10% Bolus, Dextrose 10% Bolus, glucagon (human recombinant), glucose, glucose, hydrALAZINE, insulin aspart U-100,  ondansetron, promethazine (PHENERGAN) IVPB, sodium chloride 0.9%, sodium chloride 0.9%, Flushing PICC Protocol **AND** sodium chloride 0.9% **AND** sodium chloride 0.9%    Laboratory/Diagnostic Data:  Reviewed and noted in plan where applicable- Please see chart for full lab data.    Recent Labs   Lab 07/23/19 0333 07/24/19 0352 07/25/19 0326   WBC 7.72 6.36 8.19   HGB 10.5* 10.4* 11.2*   HCT 34.3* 33.8* 35.7*    333 340       Recent Labs   Lab 07/23/19 0333 07/24/19 0352 07/24/19 2015 07/24/19 2318 07/25/19 0326 07/25/19  0858   NA  --    < > 139   < > 140 134*  --  138   K  --    < > 3.8   < > 4.0 4.1  --  3.7   CL  --    < > 97   < > 101 98  --  97   CO2  --    < > 32*   < > 28 25  --  30*   BUN  --    < > 12   < > 11 11  --  9   CREATININE  --    < > 1.1   < > 1.0 1.0  --  1.1   GLU  --    < > 337*   < > 271* 455*  --  260*   CALCIUM  --    < > 8.8   < > 9.5 9.2  --  9.7   MG 1.5*  --  1.8  --   --   --  1.8  --    PHOS 2.4*  --  2.4*  --   --   --  2.1*  --     < > = values in this interval not displayed.       Recent Labs   Lab 07/24/19 2015 07/24/19 2318 07/25/19  0858   ALKPHOS 121 119 134   ALT 6* 5* 5*   AST 16 18 16   ALBUMIN 2.6* 2.5* 2.8*   PROT 7.6 7.4 8.2   BILITOT 0.2 0.2 0.2        Microbiology labs for the last week  Microbiology Results (last 7 days)     Procedure Component Value Units Date/Time    Fungus culture [[id number]] Collected:  07/17/19 1214    Order Status:  Completed Specimen:  Wound from Foot, Left Updated:  07/24/19 1044     Fungus (Mycology) Culture Culture in progress    Narrative:       Left foot wound    Culture, Anaerobe [[id number]] Collected:  07/17/19 1214    Order Status:  Completed Specimen:  Wound from Foot, Left Updated:  07/23/19 1349     Anaerobic Culture Culture in progress    Narrative:       Left foot wound    Blood culture [[id number]] Collected:  07/15/19 0305    Order Status:  Completed Specimen:  Blood from Antecubital, Right Updated:  07/20/19  0822     Blood Culture, Routine No growth after 5 days.    Blood culture [[id number]] Collected:  07/15/19 0305    Order Status:  Completed Specimen:  Blood from Antecubital, Right Updated:  07/20/19 0822     Blood Culture, Routine No growth after 5 days.    Aerobic culture [[id number]] Collected:  07/17/19 1214    Order Status:  Completed Specimen:  Wound from Foot, Left Updated:  07/19/19 1106     Aerobic Bacterial Culture Skin janes,  no predominant organism    Narrative:       Left foot wound    Blood culture [[id number]] Collected:  07/14/19 0629    Order Status:  Completed Specimen:  Blood Updated:  07/19/19 1012     Blood Culture, Routine No growth after 5 days.    AFB Culture & Smear [[id number]] Collected:  07/17/19 1214    Order Status:  Completed Specimen:  Wound from Foot, Left Updated:  07/18/19 2127     AFB Culture & Smear Culture in progress     AFB CULTURE STAIN No acid fast bacilli seen.    Narrative:       Left foot wound    Culture, Anaerobe [[id number]]  (Abnormal) Collected:  07/14/19 1728    Order Status:  Completed Specimen:  Wound from Toe, Left Foot Updated:  07/18/19 1407     Anaerobic Culture PEPTONIPHILUS HAREI  Few      Narrative:       Left 5th metatarsal           Imaging Results          MRI Foot (Forefoot) Left Without Contrast (Final result)  Result time 07/12/19 19:07:38    Final result by Bob Oglesby MD (07/12/19 19:07:38)             Impression:      Ulceration involving the lateral plantar aspect of the distal left foot, with findings concerning for exposed bone involving the distal aspect of the 5th metatarsal and proximal phalange of the 5th toe.  There are associated changes concerning for osteomyelitis involving the distal aspect of the 5th metatarsal and entirety of the right toe.  There is osseous hyperemia involving the proximal phalange of the 2nd toe, early changes of osteomyelitis are a consideration no definite low signal on T1 at this time.      Electronically  signed by: Bob Oglesby MD  Date:    07/12/2019  Time:    19:07           Narrative:    EXAMINATION:  MRI FOOT (FOREFOOT) LEFT WITHOUT CONTRAST    CLINICAL HISTORY:  Open wound of ankle, foot and toes;    TECHNIQUE:  Multiplanar multisequence MRI images of the left forefoot were obtained without administration of IV contrast.    COMPARISON:  Radiograph 07/12/2019    FINDINGS:  There is soft tissue ulceration involving the lateral plantar aspect of the right foot, noting there appears to be exposed bone in the location, likely involving the distal aspect of the 5th metatarsal head, and proximal phalange of the 5th toe. There is diffusely increased STIR signal within the distal aspect of the 5th metatarsal, and throughout the phalanges of the 5th toe. There is corresponding low T1 signal involving the same regions, concerning for osteomyelitis. There is additional abnormal STIR signal within the base of the proximal phalange of the 2nd toe, without convincing low signal on T1, suggesting osseous hyperemia although early changes of osteomyelitis remain a consideration. There is edema and induration about the open wound, no convincing focal organized fluid collection.  The remainder of the osseous structures are grossly unremarkable. No significant joint effusions.  There is reactive edema about the intrinsic musculature of the foot, as well as along the dorsal surface.                              X-Ray Foot Complete Left (Final result)  Result time 07/12/19 11:06:16    Final result by Chandan Montana MD (07/12/19 11:06:16)             Impression:      Abnormal examination with findings strongly suggesting osteomyelitis involving the base of the proximal phalanx of the left 5th toe and likely the head of the 5th metatarsal as well.    This report was flagged in Epic as abnormal.      Electronically signed by: Chandan Montana MD  Date:    07/12/2019  Time:    11:06           Narrative:    EXAMINATION:  XR FOOT COMPLETE 3  VIEW LEFT    TECHNIQUE:  Three views of the left foot were obtained, with AP, lateral, and oblique projections submitted.    COMPARISON:  No relevant comparison examinations are currently available.    FINDINGS:  Focal soft tissue loss/irregularity involving the region of the left 5th MTP joint is observed, with gas present within the soft tissues at this level, the findings likely related to a clinically evident ulceration.  There is focal lytic bone destruction involving the base of the proximal phalanx of the 5th toe, and possibly involving the head of the 5th metatarsal as well, this radiographic appearance strongly suggesting osteomyelitis.  The remaining osseous structures appear intact, with no evidence of recent fracture and no additional areas of focal lytic bone destruction noted.  Some arterial vascular calcification in the soft tissues about the ankle is incidentally observed.                             X-Ray Chest AP Portable (Final result)  Result time 07/12/19 10:48:19    Final result by Chandan Montana MD (07/12/19 10:48:19)             Impression:      No significant intrathoracic abnormality.  Allowing for differences in projection and a poorer inspiratory depth level on the current examination, there has been no significant detrimental interval change in the appearance of the chest since 05/03/2016.      Electronically signed by: Chandan Montana MD  Date:    07/12/2019  Time:    10:48           Narrative:    EXAMINATION:  XR CHEST AP PORTABLE    CLINICAL HISTORY:  hyperglycemia;    COMPARISON:  Comparison is made to the most recent prior chest radiograph, dated 05/03/2016.    FINDINGS:  Heart size is normal, as is the appearance of the pulmonary vascularity.  Lung zones are clear, and free of significant airspace consolidation or volume loss.  No pleural fluid.  No abnormal mediastinal widening.  No pneumothorax.  Incidental note is made of some spurring at the right acromioclavicular joint level.        "                       Estimated body mass index is 25.28 kg/m² as calculated from the following:    Height as of this encounter: 6' 1" (1.854 m).    Weight as of this encounter: 86.9 kg (191 lb 9.3 oz).    I & O (Last 24H):    Intake/Output Summary (Last 24 hours) at 7/25/2019 1300  Last data filed at 7/25/2019 0600  Gross per 24 hour   Intake 1400 ml   Output 2850 ml   Net -1450 ml       Estimated Creatinine Clearance: 89.8 mL/min (based on SCr of 1.1 mg/dL).    ASSESSMENT/PLAN:     Active Problems:    Active Diagnoses:     Diagnosis Date Noted POA    PRINCIPAL PROBLEM:  Acute osteomyelitis [M86.10] x-ray left foot - strongly suggesting osteomyelitis involving the base of the proximal phalanx of the left 5th toe and likely the head of the 5th metatarsal as well.        recently lost his insurance and has been unable to follow up with wound care.  His last follow-up with primary care provider and podiatrist was about 2 years.  He has been managing his right foot ulcer at home with dressing but over the past 2 weeks an ulcer on his left foot has developed. He denies any pain or injury- has chronic numbness of bilateral lower extremities and hands from diabetic neuropathy.  Started on IV Zosyn and vanc.  Podiatry and Infectious Disease consult.  aerobic culture with Group B strep.  Add vancomycin for Staph aureus  (monitor for toxicity)    07/14/2019   s/p I&D and 5th ray amputation in the OR  on  07/14/2019.  No weight-bearing left lower x-ray ALISSA ordered to evaluate vascular status intraoperative Cultures pending . clean margin cultures and pathology pending. Deescalated from vanc and zosyn to cefazolin 6 gram IV cont infusion.  blood cultures from 07/13/2019 no growth.  Continues to have nausea vomiting since unable to tolerate anything by mouth.  Started on IV hydration and Reglan    07/23/19  underwent repeat I&D and left partial 5th ray amputation 7/17.   Per OP note,  purulent drainage was expressed proximal " to distal from the level of the plantar aspect of the left calcaneus to the open wound on the plantar midfoot. Purulent drainage was also manually expressed from the plantar distal aspect of the forefoot. Cultures of this were sent.   A small portion of the distal metatarsal was reported as sent as a clean margin to Micro and pathology.  The wound was partially closed, Neox allograft and wound Vac placed.  Repeat blood cultures remain NGTD.   ABIs without significant PAD.   Tolerating oral diet without nausea and vomiting.  Status post PICC line placement on 07/23/2019.  Wound VAC in place to left foot.  PT evaluation, crutches ordered per Podiatry.  No weight-bearing to left foot . If all infection removed in the OR and clean margins negative, anticipate 2 weeks of IV antibiotics for bacteremia and skin and soft tissue infection from date of surgery, 7/17.   If there is concern for residual osteomyelitis, will need 6 weeks of IV therapy.plan on minimum of 14 days of abx, with ID follow-up for final duration 07/12/2019 Yes    Sepsis [A41.9] /bacteremia - blood cultures from 07/12/2019 group B Streptococcus. secondary to diabetic foot ulcer.  Elevated ESR greater than 120 As above 07/12/2019 Yes    Leukocytosis [D72.829] 14 secondary to sepsis. resolved  07/12/2019 Unknown    Anemia [D64.9] hemoglobin at 11.2, stable, normocytic.  Monitor 07/12/2019 Unknown    Type 2 diabetes mellitus with left eye affected by mild nonproliferative retinopathy without macular edema, without long-term current use of insulin [E11.3292]At baseline only takes insulin once a day stopped  taking more than a week because of nausea and vomiting and poor oral intake.  Does not check fingersticks regularly at home. recently lost his insurance.  Obtain HbA1c elevated greater than 14.   on Levemir 22 units nightly and NovoLog 6 units t.i.d. with meals 08/11/2017 Yes       Chronic    Diabetic ketoacidosis without coma associated with type 2  diabetes mellitus [E11.10]  Found to be in DKA in the emergency department with beta hydroxybutyrate elevated at 4.1.  Started on insulin drip.  Received normal saline in the emergency department    07/13/2019  Anion gap resolved- bicarbonate 23, insulin drip discontinued.        hypomagnesemia replaced    Hypophosphatemia- placed   05/30/2017 Unknown    Type 2 diabetes mellitus with diabetic neuropathy, with long-term current use of insulin [E11.40, Z79.4] .  Blood glucose uncontrolled continue with Levemir 28 units q.h.s. and NovoLog 9 units with meals and low-dose correction scale 05/03/2016 Not Applicable       Chronic    Mixed hyperlipidemia [E78.2] continue with Lipitor 08/12/2014 Yes    Essential hypertension [I10]  amlodipine dose increased to 10 mg daily.  06/06/2013 Yes       Chronic               Disposition- rehab.  Awaiting insurance authorization    DVT prophylaxis addressed with:  Brian Wick MD  Attending Staff Physician  [facility]  pager- [phone] [id number] [phone]

## 2019-07-25 NOTE — PLAN OF CARE
07/25/19 1511   Post-Acute Status   Post-Acute Authorization Placement   Post-Acute Placement Status Insurance Denial     Patient was awaiting insurance approval for rehab placement with [facility]. SW received a message reporting that patient insurance agency denied rehab placement and a peer to peer will have to be conducted in order to move forward.  ALIYAH will follow up with physician.    Josefina Hammond LMSW  [facility]   [id number]

## 2019-07-25 NOTE — ASSESSMENT & PLAN NOTE
-  Presented for nausea and vomiting x 4-5 days with associated subjective fevers and chills and new L foot ulcer x ~2 weeks  -  Blood cultures (7/12/19) + group B streptococcus--> repeat NGTD (since 7/13)  -  Wound cultures (7/12/19) + finegoldia magna, group B streptococcus, and MSSA    -  Podiatry consulted--> L foot x-ray concerning for osteomyelitis of partial 5th ray  -  S/p L foot wound debridement on 7/14/19 with subsequent L foot partial 5th ray amputation with debridement and washout and wound vac placement on 7/17/19  -  NWB L foot  -  ID consulted--> recommended IV Cefazolin x 2 weeks from surgery (7/17/19) if cultures negative vs 6 weeks if concern for residual osteomyelitis- cultures pending- follow-up with ID for final duration recommendation

## 2019-07-25 NOTE — SUBJECTIVE & OBJECTIVE
Interval History 7/25/2019:  Patient is seen for follow-up rehab evaluation and recommendations: Evaluated by PT and OT yesterday.  Agreeable to rehab.  Insurance approval pending.     HPI, Past Medical, Family, and Social History remains the same as documented in the initial encounter.    Scheduled Medications:    amLODIPine  10 mg Oral Daily    atorvastatin  80 mg Oral Daily    ceFAZolin(ANCEF) in D5W 500 mL CONTINUOUS INFUSION  6 g Intravenous Q24H    enoxaparin  40 mg Subcutaneous Daily    insulin aspart U-100  9 Units Subcutaneous TIDWM    insulin detemir U-100  28 Units Subcutaneous QHS    potassium, sodium phosphates  2 packet Oral QID (AC & HS)    sodium chloride 0.9%  10 mL Intravenous Q6H     PRN Medications: acetaminophen, calcium carbonate, Dextrose 10% Bolus, Dextrose 10% Bolus, glucagon (human recombinant), glucose, glucose, hydrALAZINE, insulin aspart U-100, ondansetron, promethazine (PHENERGAN) IVPB, sodium chloride 0.9%, sodium chloride 0.9%, Flushing PICC Protocol **AND** sodium chloride 0.9% **AND** sodium chloride 0.9%    Review of Systems   Constitutional: Positive for activity change. Negative for chills and fever.   HENT: Negative for trouble swallowing and voice change.    Eyes: Negative for pain and visual disturbance.   Respiratory: Negative for cough and shortness of breath.    Cardiovascular: Positive for leg swelling. Negative for chest pain and palpitations.   Gastrointestinal: Negative for abdominal pain, nausea and vomiting.   Genitourinary: Negative for difficulty urinating and flank pain.   Musculoskeletal: Positive for gait problem. Negative for back pain and neck pain.   Skin: Positive for wound. Negative for rash.   Neurological: Positive for numbness. Negative for dizziness and headaches.   Psychiatric/Behavioral: Negative for agitation. The patient is not nervous/anxious.      Objective:     Vital Signs (Most Recent):  Temp: 98.2 °F (36.8 °C) (07/25/19 0835)  Pulse: 102  (07/25/19 0835)  Resp: 20 (07/25/19 0835)  BP: (!) 146/97 (07/25/19 0835)  SpO2: 96 % (07/25/19 0835)    Vital Signs (24h Range):  Temp:  [96.8 °F (36 °C)-98.8 °F (37.1 °C)] 98.2 °F (36.8 °C)  Pulse:  [] 102  Resp:  [18-20] 20  SpO2:  [94 %-99 %] 96 %  BP: (128-154)/(79-97) 146/97     Physical Exam   Constitutional: He is oriented to person, place, and time. He appears well-developed and well-nourished. No distress.   HENT:   Head: Normocephalic and atraumatic.   Right Ear: External ear normal.   Left Ear: External ear normal.   Nose: Nose normal.   Eyes: Right eye exhibits no discharge. Left eye exhibits no discharge. No scleral icterus.   Neck: Normal range of motion.   Cardiovascular: Normal rate and intact distal pulses.   Pulmonary/Chest: Effort normal. No respiratory distress. He has no wheezes.   Abdominal: Soft. He exhibits no distension. There is no tenderness.   Musculoskeletal: He exhibits edema. He exhibits no tenderness.   R foot with 1st and 5th amputations- R foot plantar dressing intact  L foot with dressing and wound vac intact   Neurological: He is alert and oriented to person, place, and time. He exhibits normal muscle tone.   Skin: Skin is warm and dry. No rash noted.   Psychiatric: He has a normal mood and affect. His behavior is normal.   Vitals reviewed.    Diagnostic Results:   Labs: Reviewed  X-Ray: Reviewed  US: Reviewed  MRI: Reviewed    NEUROLOGICAL EXAMINATION:     MENTAL STATUS   Oriented to person, place, and time.

## 2019-07-25 NOTE — ASSESSMENT & PLAN NOTE
"-  See bacteremia 2/2 GBS  -  Per Podiatry- "DC Instructions:  Patient is to follow up with podiatry within 10 days of discharge.  Call [phone]  to make an appointment.  Home health/facility to do wound vac dressing changes every  as follows:  Rinse with saline, pat dry, place adaptic over graft, apply wound vac, place vac on 125mmHg slow continuous.  Dress in cast padding and coban"  "

## 2019-07-25 NOTE — PLAN OF CARE
Problem: Adult Inpatient Plan of Care  Goal: Plan of Care Review  Outcome: Ongoing (interventions implemented as appropriate)     07/25/19 1222   Plan of Care Review   Plan of Care Reviewed With patient   Progress no change   IV antibiotics continue, CBG ACHS, tolerating po diet.  Up to bathroom, wt bearing to left heel, wound vac intact, drainging dark brown fluid.  Dressing intact.  Up to recliner, on chair pad for comfort.  AAOX4.

## 2019-07-25 NOTE — PROGRESS NOTES
[facility]  Physical Medicine & Rehab  Progress Note    Patient Name: Indio Ryder Jr.  MRN: [id number]  Admission Date: 7/12/2019  Length of Stay: 13 days  Attending Physician: Vicente Wick MD    Subjective:     Principal Problem:Bacteremia due to group B Streptococcus    Hospital Course:   7/23/19:  Therapy evaluations pending.   7/24/19:  Evaluated by PT and OT.  Bed mobility SV.  Sit to stand and transfers CGA with RW.  Ambulated ~8 ft CGA with RW.  UBD Alvaro.    Interval History 7/25/2019:  Patient is seen for follow-up rehab evaluation and recommendations: Evaluated by PT and OT yesterday.  Agreeable to rehab.  Insurance approval pending.     HPI, Past Medical, Family, and Social History remains the same as documented in the initial encounter.    Scheduled Medications:    amLODIPine  10 mg Oral Daily    atorvastatin  80 mg Oral Daily    ceFAZolin(ANCEF) in D5W 500 mL CONTINUOUS INFUSION  6 g Intravenous Q24H    enoxaparin  40 mg Subcutaneous Daily    insulin aspart U-100  9 Units Subcutaneous TIDWM    insulin detemir U-100  28 Units Subcutaneous QHS    potassium, sodium phosphates  2 packet Oral QID (AC & HS)    sodium chloride 0.9%  10 mL Intravenous Q6H     PRN Medications: acetaminophen, calcium carbonate, Dextrose 10% Bolus, Dextrose 10% Bolus, glucagon (human recombinant), glucose, glucose, hydrALAZINE, insulin aspart U-100, ondansetron, promethazine (PHENERGAN) IVPB, sodium chloride 0.9%, sodium chloride 0.9%, Flushing PICC Protocol **AND** sodium chloride 0.9% **AND** sodium chloride 0.9%    Review of Systems   Constitutional: Positive for activity change. Negative for chills and fever.   HENT: Negative for trouble swallowing and voice change.    Eyes: Negative for pain and visual disturbance.   Respiratory: Negative for cough and shortness of breath.    Cardiovascular: Positive for leg swelling. Negative for chest pain and palpitations.   Gastrointestinal: Negative  for abdominal pain, nausea and vomiting.   Genitourinary: Negative for difficulty urinating and flank pain.   Musculoskeletal: Positive for gait problem. Negative for back pain and neck pain.   Skin: Positive for wound. Negative for rash.   Neurological: Positive for numbness. Negative for dizziness and headaches.   Psychiatric/Behavioral: Negative for agitation. The patient is not nervous/anxious.      Objective:     Vital Signs (Most Recent):  Temp: 98.2 °F (36.8 °C) (07/25/19 0835)  Pulse: 102 (07/25/19 0835)  Resp: 20 (07/25/19 0835)  BP: (!) 146/97 (07/25/19 0835)  SpO2: 96 % (07/25/19 0835)    Vital Signs (24h Range):  Temp:  [96.8 °F (36 °C)-98.8 °F (37.1 °C)] 98.2 °F (36.8 °C)  Pulse:  [] 102  Resp:  [18-20] 20  SpO2:  [94 %-99 %] 96 %  BP: (128-154)/(79-97) 146/97     Physical Exam   Constitutional: He is oriented to person, place, and time. He appears well-developed and well-nourished. No distress.   HENT:   Head: Normocephalic and atraumatic.   Right Ear: External ear normal.   Left Ear: External ear normal.   Nose: Nose normal.   Eyes: Right eye exhibits no discharge. Left eye exhibits no discharge. No scleral icterus.   Neck: Normal range of motion.   Cardiovascular: Normal rate and intact distal pulses.   Pulmonary/Chest: Effort normal. No respiratory distress. He has no wheezes.   Abdominal: Soft. He exhibits no distension. There is no tenderness.   Musculoskeletal: He exhibits edema. He exhibits no tenderness.   R foot with 1st and 5th amputations- R foot plantar dressing intact  L foot with dressing and wound vac intact   Neurological: He is alert and oriented to person, place, and time. He exhibits normal muscle tone.   Skin: Skin is warm and dry. No rash noted.   Psychiatric: He has a normal mood and affect. His behavior is normal.   Vitals reviewed.    Diagnostic Results:   Labs: Reviewed  X-Ray: Reviewed  US: Reviewed  MRI: Reviewed    Assessment/Plan:      * Bacteremia due to group B  "Streptococcus  Acute osteomyelitis  Foot ulcer  -  Chronic R diabetic foot ulcer + presented for nausea and vomiting x 4-5 days with associated subjective fevers and chills and new L foot ulcer x ~2 weeks  -  Blood cultures (7/12/19) + group B streptococcus--> repeat NGTD (since 7/13)  -  Wound cultures (7/12/19) + finegoldia magna, group B streptococcus, and MSSA    -  Podiatry consulted--> L foot x-ray concerning for osteomyelitis of partial 5th ray  -  S/p L foot wound debridement on 7/14/19 with subsequent L foot partial 5th ray amputation with debridement and washout and wound vac placement on 7/17/19  -  NWB L foot  -  ID consulted--> recommended IV Cefazolin x 2 weeks from surgery (7/17/19) if cultures negative vs 6 weeks if concern for residual osteomyelitis- cultures pending- follow-up with ID for final duration recommendation      -  Per Podiatry- "DC Instructions:  Patient is to follow up with podiatry within 10 days of discharge.  Call [phone]  to make an appointment.  Home health/facility to do wound vac dressing changes every  as follows:  Rinse with saline, pat dry, place adaptic over graft, apply wound vac, place vac on 125mmHg slow continuous.  Dress in cast padding and coban"    Impaired gait and mobility  -  Baseline function-->   -  NWB LLE  Recommendations  -  Encourage mobility, OOB in chair, and early ambulation as appropriate  -  PT/OT evaluate and treat  -  Pain management  -  DVT prophylaxis  -  Monitor for and prevent skin breakdown and pressure ulcers  · Early mobility, repositioning/weight shifting every 20-30 minutes when sitting, turn patient every 2 hours, proper mattress/overlay and chair cushioning, pressure relief/heel protector boots    Diabetic ketoacidosis without coma associated with type 2 diabetes mellitus  -  Elevated beta hydroxybutyrate on arrival  -  Endocrine following  -  Resolved     Recommend Inpatient Rehab.  Insurance approval pending for rehab.  Will follow. "     RONNY Garcia  Department of Physical Medicine & Rehab   [facility]

## 2019-07-25 NOTE — PLAN OF CARE
Endocrinology following for management of diabetes type 2. BGs remain out of goal 270-460 likely from increased appetite and snacking (noted to have Josefa chips) and oral intake. Will increase insulin TDD by 20%.     Plan:   - Increase Levemir to 28 units qHS   - Continue pre-meal Novolog to 9 unit SC qAC   - Continue low dose correction insulin qAC and HS   - Accuchecks qAC and HS      Discharge Recommendations: TBRADHA Saini  Endocrinology Fellow

## 2019-07-25 NOTE — PLAN OF CARE
Problem: Adult Inpatient Plan of Care  Goal: Plan of Care Review  Outcome: Ongoing (interventions implemented as appropriate)  Pt AAOx4, VSS, afebrile. Wound vac to left foot draining, dressing C/D/I. Up with assist to bathroom. CBG AC/HS, coverage administered per order. Continuous Ancef infusing through PICC line. No complaints at this time. WCTM.

## 2019-07-26 LAB
ALBUMIN SERPL BCP-MCNC: 2.9 G/DL (ref 3.5–5.2)
ALP SERPL-CCNC: 149 U/L (ref 55–135)
ALT SERPL W/O P-5'-P-CCNC: 7 U/L (ref 10–44)
ANION GAP SERPL CALC-SCNC: 10 MMOL/L (ref 8–16)
AST SERPL-CCNC: 18 U/L (ref 10–40)
BACTERIA SPEC ANAEROBE CULT: NORMAL
BASOPHILS # BLD AUTO: 0.03 K/UL (ref 0–0.2)
BASOPHILS NFR BLD: 0.4 % (ref 0–1.9)
BILIRUB SERPL-MCNC: 0.2 MG/DL (ref 0.1–1)
BUN SERPL-MCNC: 11 MG/DL (ref 6–20)
CALCIUM SERPL-MCNC: 9.8 MG/DL (ref 8.7–10.5)
CHLORIDE SERPL-SCNC: 97 MMOL/L (ref 95–110)
CO2 SERPL-SCNC: 29 MMOL/L (ref 23–29)
CREAT SERPL-MCNC: 1.1 MG/DL (ref 0.5–1.4)
DIFFERENTIAL METHOD: ABNORMAL
EOSINOPHIL # BLD AUTO: 0.1 K/UL (ref 0–0.5)
EOSINOPHIL NFR BLD: 0.8 % (ref 0–8)
ERYTHROCYTE [DISTWIDTH] IN BLOOD BY AUTOMATED COUNT: 13.2 % (ref 11.5–14.5)
EST. GFR  (AFRICAN AMERICAN): >60 ML/MIN/1.73 M^2
EST. GFR  (NON AFRICAN AMERICAN): >60 ML/MIN/1.73 M^2
GLUCOSE SERPL-MCNC: 362 MG/DL (ref 70–110)
HCT VFR BLD AUTO: 37.3 % (ref 40–54)
HGB BLD-MCNC: 11.6 G/DL (ref 14–18)
IMM GRANULOCYTES # BLD AUTO: 0.02 K/UL (ref 0–0.04)
IMM GRANULOCYTES NFR BLD AUTO: 0.3 % (ref 0–0.5)
LYMPHOCYTES # BLD AUTO: 2.3 K/UL (ref 1–4.8)
LYMPHOCYTES NFR BLD: 29.9 % (ref 18–48)
MAGNESIUM SERPL-MCNC: 1.8 MG/DL (ref 1.6–2.6)
MCH RBC QN AUTO: 27.8 PG (ref 27–31)
MCHC RBC AUTO-ENTMCNC: 31.1 G/DL (ref 32–36)
MCV RBC AUTO: 89 FL (ref 82–98)
MONOCYTES # BLD AUTO: 0.5 K/UL (ref 0.3–1)
MONOCYTES NFR BLD: 6.8 % (ref 4–15)
NEUTROPHILS # BLD AUTO: 4.7 K/UL (ref 1.8–7.7)
NEUTROPHILS NFR BLD: 61.8 % (ref 38–73)
NRBC BLD-RTO: 0 /100 WBC
PHOSPHATE SERPL-MCNC: 2.5 MG/DL (ref 2.7–4.5)
PLATELET # BLD AUTO: 363 K/UL (ref 150–350)
PMV BLD AUTO: 10.4 FL (ref 9.2–12.9)
POCT GLUCOSE: 217 MG/DL (ref 70–110)
POCT GLUCOSE: 234 MG/DL (ref 70–110)
POCT GLUCOSE: 297 MG/DL (ref 70–110)
POCT GLUCOSE: 329 MG/DL (ref 70–110)
POCT GLUCOSE: 338 MG/DL (ref 70–110)
POTASSIUM SERPL-SCNC: 4.2 MMOL/L (ref 3.5–5.1)
PROT SERPL-MCNC: 8.1 G/DL (ref 6–8.4)
RBC # BLD AUTO: 4.17 M/UL (ref 4.6–6.2)
SODIUM SERPL-SCNC: 136 MMOL/L (ref 136–145)
WBC # BLD AUTO: 7.63 K/UL (ref 3.9–12.7)

## 2019-07-26 PROCEDURE — 25000003 PHARM REV CODE 250: Performed by: HOSPITALIST

## 2019-07-26 PROCEDURE — 97530 THERAPEUTIC ACTIVITIES: CPT

## 2019-07-26 PROCEDURE — 97116 GAIT TRAINING THERAPY: CPT

## 2019-07-26 PROCEDURE — 84100 ASSAY OF PHOSPHORUS: CPT

## 2019-07-26 PROCEDURE — 99024 PR POST-OP FOLLOW-UP VISIT: ICD-10-PCS | Mod: ,,, | Performed by: PODIATRIST

## 2019-07-26 PROCEDURE — 85025 COMPLETE CBC W/AUTO DIFF WBC: CPT

## 2019-07-26 PROCEDURE — 99232 SBSQ HOSP IP/OBS MODERATE 35: CPT | Mod: ,,, | Performed by: HOSPITALIST

## 2019-07-26 PROCEDURE — 97110 THERAPEUTIC EXERCISES: CPT

## 2019-07-26 PROCEDURE — 63600175 PHARM REV CODE 636 W HCPCS: Performed by: HOSPITALIST

## 2019-07-26 PROCEDURE — 99232 PR SUBSEQUENT HOSPITAL CARE,LEVL II: ICD-10-PCS | Mod: ,,, | Performed by: HOSPITALIST

## 2019-07-26 PROCEDURE — 80053 COMPREHEN METABOLIC PANEL: CPT

## 2019-07-26 PROCEDURE — 63600175 PHARM REV CODE 636 W HCPCS: Performed by: PHYSICIAN ASSISTANT

## 2019-07-26 PROCEDURE — 83735 ASSAY OF MAGNESIUM: CPT

## 2019-07-26 PROCEDURE — 20600001 HC STEP DOWN PRIVATE ROOM

## 2019-07-26 PROCEDURE — 99024 POSTOP FOLLOW-UP VISIT: CPT | Mod: ,,, | Performed by: PODIATRIST

## 2019-07-26 PROCEDURE — A4216 STERILE WATER/SALINE, 10 ML: HCPCS | Performed by: HOSPITALIST

## 2019-07-26 RX ORDER — INSULIN ASPART 100 [IU]/ML
3 INJECTION, SOLUTION INTRAVENOUS; SUBCUTANEOUS
Status: DISCONTINUED | OUTPATIENT
Start: 2019-07-26 | End: 2019-08-02 | Stop reason: HOSPADM

## 2019-07-26 RX ORDER — SODIUM,POTASSIUM PHOSPHATES 280-250MG
2 POWDER IN PACKET (EA) ORAL
Status: DISPENSED | OUTPATIENT
Start: 2019-07-26 | End: 2019-07-27

## 2019-07-26 RX ORDER — INSULIN ASPART 100 [IU]/ML
11 INJECTION, SOLUTION INTRAVENOUS; SUBCUTANEOUS
Status: DISCONTINUED | OUTPATIENT
Start: 2019-07-26 | End: 2019-07-29

## 2019-07-26 RX ADMIN — Medication 10 ML: at 06:07

## 2019-07-26 RX ADMIN — INSULIN ASPART 11 UNITS: 100 INJECTION, SOLUTION INTRAVENOUS; SUBCUTANEOUS at 04:07

## 2019-07-26 RX ADMIN — CEFAZOLIN 6 G: 1 INJECTION, POWDER, FOR SOLUTION INTRAMUSCULAR; INTRAVENOUS at 01:07

## 2019-07-26 RX ADMIN — INSULIN ASPART 2 UNITS: 100 INJECTION, SOLUTION INTRAVENOUS; SUBCUTANEOUS at 11:07

## 2019-07-26 RX ADMIN — INSULIN ASPART 2 UNITS: 100 INJECTION, SOLUTION INTRAVENOUS; SUBCUTANEOUS at 08:07

## 2019-07-26 RX ADMIN — INSULIN ASPART 9 UNITS: 100 INJECTION, SOLUTION INTRAVENOUS; SUBCUTANEOUS at 09:07

## 2019-07-26 RX ADMIN — AMLODIPINE BESYLATE 10 MG: 10 TABLET ORAL at 09:07

## 2019-07-26 RX ADMIN — ATORVASTATIN CALCIUM 80 MG: 20 TABLET, FILM COATED ORAL at 09:07

## 2019-07-26 RX ADMIN — ENOXAPARIN SODIUM 40 MG: 100 INJECTION SUBCUTANEOUS at 05:07

## 2019-07-26 RX ADMIN — Medication 10 ML: at 12:07

## 2019-07-26 RX ADMIN — INSULIN ASPART 9 UNITS: 100 INJECTION, SOLUTION INTRAVENOUS; SUBCUTANEOUS at 11:07

## 2019-07-26 RX ADMIN — POTASSIUM & SODIUM PHOSPHATES POWDER PACK 280-160-250 MG 2 PACKET: 280-160-250 PACK at 06:07

## 2019-07-26 RX ADMIN — POTASSIUM & SODIUM PHOSPHATES POWDER PACK 280-160-250 MG 2 PACKET: 280-160-250 PACK at 01:07

## 2019-07-26 RX ADMIN — INSULIN ASPART 4 UNITS: 100 INJECTION, SOLUTION INTRAVENOUS; SUBCUTANEOUS at 09:07

## 2019-07-26 RX ADMIN — Medication 10 ML: at 01:07

## 2019-07-26 RX ADMIN — Medication 10 ML: at 05:07

## 2019-07-26 RX ADMIN — INSULIN ASPART 2 UNITS: 100 INJECTION, SOLUTION INTRAVENOUS; SUBCUTANEOUS at 04:07

## 2019-07-26 NOTE — PLAN OF CARE
Endocrinology following for management of diabetes type 2. BGs remain out of goal 270-320s likely from increased appetite and snacking (pt reports he snacks on baked chips) and oral intake.      Plan:   - Continue Levemir at 28 units qHS   - Continue pre-meal Novolog to 11 unit SC qAC   - Continue low dose correction insulin qAC and HS   - Accuchecks qAC and HS      Discharge Recommendations: NATACHA Stewart M.D.   Internal Medicine Resident

## 2019-07-26 NOTE — PLAN OF CARE
07/26/19 1040   Post-Acute Status   Post-Acute Authorization Placement   Other Status See Comments     Peer to peer meeting has been scheduled for patient, Dr. Del Valle will be contacting Dr. Wick by 1:00. SW will follow up.    Josefina Hammond, ALIYAH  [facility]   [phone]

## 2019-07-26 NOTE — PLAN OF CARE
to the bedside at this time to see the patient.  The patient is aware the CM team continues to follow throughout this admission for discharge needs and/or recommendations.  CM name and number remains on the board at the bedside for the patient or the family to     07/26/19 1150   Discharge Reassessment   Assessment Type Discharge Planning Reassessment   Provided patient/caregiver education on the expected discharge date and the discharge plan Yes   Do you have any problems affording any of your prescribed medications? No   Discharge Plan A Rehab   Discharge Plan B Home;Home Health   DME Needed Upon Discharge    (TBD)   Patient choice form signed by patient/caregiver Yes   Anticipated Discharge Disposition Rehab   Can the patient answer the patient profile reliably? Yes, cognitively intact   How does the patient rate their overall health at the present time? Fair    call with discharge needs or questions.  Understanding verbalized.

## 2019-07-26 NOTE — PLAN OF CARE
Problem: Occupational Therapy Goal  Goal: Occupational Therapy Goal  Goals to be met by: 8/3/19    Patient will increase functional independence with ADLs by performing:    UE Dressing with Supervision.  LE Dressing with Stand-by Assistance.  Grooming while standing at sink with Stand-by Assistance.  Toileting from toilet with Stand-by Assistance for hygiene and clothing management.   Supine to sit with [place]. MET 7/26  Toilet transfer to toilet with Stand-by Assistance.     Outcome: Ongoing (interventions implemented as appropriate)  Con't POC.    NATALIE Torres

## 2019-07-26 NOTE — PLAN OF CARE
07/26/19 0947   Post-Acute Status   Post-Acute Authorization Placement   Other Status See Comments     Patient was denied rehab placement by insurance agency. ALIYAH was advised by Nidhi DÍAZ that a peer to peer meeting was needed. ALIYAH contacted [phone] and left a message for  and will continue to follow up.    Josefina Hammond LMSW  [facility]   [phone]

## 2019-07-26 NOTE — PLAN OF CARE
Problem: Physical Therapy Goal  Goal: Physical Therapy Goal  Goals to be met by: 2019    Patient will increase functional independence with mobility by performin. Supine to sit with Modified [place]  2. Sit to supine with Modified [place]  3. Sit to stand transfer with Supervision  4. Gait  x 20 feet with Stand-by Assistance using LRAD  5. Lower extremity exercise program x15 reps per handout, with independence    Outcome: Ongoing (interventions implemented as appropriate)  Pt would benefit from continued skilled acute PT services to improve functional mobility. POC is to cont. Towards current goals set to improve towards PLOF.

## 2019-07-26 NOTE — PT/OT/SLP PROGRESS
Occupational Therapy   Treatment    Name: Indio Ryder Jr.  MRN: [id number]  Admitting Diagnosis:  Bacteremia due to group B Streptococcus  9 Days Post-Op    Recommendations:     Discharge Recommendations: rehabilitation facility  Discharge Equipment Recommendations:  walker, rolling, shower chair  Barriers to discharge:       Assessment:     Indio Ryder Jr. is a 49 y.o. male with a medical diagnosis of Bacteremia due to group B Streptococcus.  He presents with good understanding and adherence to LLE NWB status. Con't OT to facilitate return to prior (I) functioning. Performance deficits affecting function are weakness, impaired self care skills, impaired balance, decreased coordination, impaired functional mobilty, impaired endurance, gait instability, orthopedic precautions, decreased lower extremity function.     Rehab Prognosis:  Good; patient would benefit from acute skilled OT services to address these deficits and reach maximum level of function.       Plan:     Patient to be seen 3 x/week to address the above listed problems via self-care/home management, therapeutic activities, therapeutic exercises  · Plan of Care Expires: 08/22/19  · Plan of Care Reviewed with: patient    Subjective     Pain/Comfort:  · Pain Rating 1: 0/10    Objective:     Communicated with: rn prior to session.  Patient found supine with wound vac, PICC line upon OT entry to room.    General Precautions: Standard, fall   Orthopedic Precautions:LLE non weight bearing   Braces:       Occupational Performance:     Bed Mobility:    · Patient completed Rolling/Turning to Left with  independence  · Patient completed Scooting/Bridging with independence  · Patient completed Supine to Sit with independence     Functional Mobility/Transfers:  · Patient completed Sit <> Stand Transfer with contact guard assistance  with  rolling walker   · Patient completed Bed <> Chair Transfer using Step Transfer technique with contact guard assistance  with rolling walker    Activities of Daily Living:  · Upper Body Dressing: supervision     [facility] 6 Click ADL: 21    Treatment & Education:  From chair level, pt completed BUE/LE therex (x 15 reps each) with yellow tband including:  - bicep curls  - resistive tricep extension  - shld presses  - shld Abd/Add (decreased shld flexion due to bad rotator cuffs)    Patient left up in chair with all lines intact and call button in reachEducation:      GOALS:   Multidisciplinary Problems     Occupational Therapy Goals        Problem: Occupational Therapy Goal    Goal Priority Disciplines Outcome Interventions   Occupational Therapy Goal     OT, PT/OT Ongoing (interventions implemented as appropriate)    Description:  Goals to be met by: 8/3/19    Patient will increase functional independence with ADLs by performing:    UE Dressing with Supervision.  LE Dressing with Stand-by Assistance.  Grooming while standing at sink with Stand-by Assistance.  Toileting from toilet with Stand-by Assistance for hygiene and clothing management.   Supine to sit with [place]. MET 7/26  Toilet transfer to toilet with Stand-by Assistance.                       Time Tracking:     OT Date of Treatment: 07/26/19  OT Start Time: 0850  OT Stop Time: 0914  OT Total Time (min): 24 min    Billable Minutes:Therapeutic Activity 14  Therapeutic Exercise 10    NATALIE Torres  7/26/2019

## 2019-07-26 NOTE — PROGRESS NOTES
Progress Note  Hospital Medicine    Admit Date: 7/12/2019  Length of Stay:  LOS: 14 days     SUBJECTIVE:         Follow-up For:  Bacteremia due to group B Streptococcus    HPI/Interval history (See H&P for complete P,F,SHx) :   Over view  Indio Ryder Jr. is a 49 y.o. male with a PMH of T2DM (poorly  controlled, with long term insulin use) and HTN who presented to the ED on 7/12/2019 diagnosed with  Vomiting (vomiting for past 4 days, denies sick contacts)   Oriented x3 accompanied by daughter at the bedside.  Mentions that he wears lower extremity stockings for edema. Reportedly formed a new ulcer on the left lateral aspect of the foot about 10 days back.  had 4-5 days of nausea and vomiting. Vomiting is non-bloody, No abdominal pain..  It occurs immediately after eating and he has not been able to keep anything down. Associated with fevers and chills but no objective temperature measurement.  At baseline only takes insulin once a day stop taking more than a week because of nausea and vomiting and poor oral intake.  Does not check fingersticks regularly at home. recently lost his insurance and has been unable to follow up with wound care.  His last follow-up with primary care provider and podiatrist was about 2 years.  He has been managing his right foot ulcer at home with dressing but over the past 2 weeks an ulcer on his left foot has developed. He denies any pain or injury- has chronic numbness of bilateral lower extremities and hands from diabetic neuropathy.  Found to be in DKA in the emergency department with beta hydroxybutyrate elevated at 4.1.  Started on insulin drip    07/13/2019   Temperature of 101.5 last night.  Anion gap resolved- bicarbonate 23, insulin drip discontinued.  Started on Levemir 23 units q.a.m. 7 units as part t.i.d. with meals with low-dose sliding scale.  Diabetic diet and NPO from midnight for possible intervention.  MRI of the foot- Ulceration involving the lateral plantar  aspect of the distal left foot, with findings concerning for exposed bone involving the distal aspect of the 5th metatarsal and proximal phalange of the 5th toe.  There are associated changes concerning for osteomyelitis involving the distal aspect of the 5th metatarsal and entirety of the right toe.  There is osseous hyperemia involving the proximal phalange of the 2nd toe, early changes of osteomyelitis are a consideration . aerobic culture  and blood culture with Group B strep. discontinued vancomycin       07/14/2019  Had urinary retention of 800 mL but spontaneously voided.  Started on transitional insulin drip as NPO from midnight for OR today.  Hypokalemia repeat.  Blood cultures daily until clear.  Aerobic cultures with group B Streptococcus and Staph aureus.  Added vancomycin ( monitor for toxicity)    07/15/2019   s/p I&D and 5th ray amputation in the OR  on  07/14/2019.  ALISSA ordered to evaluate vascular status intraoperative Cultures pending . clean margin cultures and pathology pending. Deescalated from vanc and zosyn to cefazolin 6 gram IV cont infusion.  blood cultures from 07/13/2019 no growth.  Continues to have nausea vomiting since unable to tolerate anything by mouth.  Started on IV hydration and Reglan    07/23/2019  underwent repeat I&D and left partial 5th ray amputation 7/17.   Per OP note,  purulent drainage was expressed proximal to distal from the level of the plantar aspect of the left calcaneus to the open wound on the plantar midfoot. Purulent drainage was also manually expressed from the plantar distal aspect of the forefoot. Cultures of this were sent.   A small portion of the distal metatarsal was reported as sent as a clean margin to Micro and pathology.  The wound was partially closed, Neox allograft and wound Vac placed.  Repeat blood cultures remain NGTD.   ABIs without significant PAD.   Tolerating oral diet without nausea and vomiting.  Status post PICC line placement on  07/23/2019.  Wound VAC in place to left foot.  PT evaluation, crutches ordered per Podiatry.  No weight-bearing to left foot    Interval history  Insurance denied rehab placement.  Needs peer to peer    Review of Systems: List if applicable  Constitutional: Positive for activity change, appetite change (Poor appetite for the last and), chills, fatigue, fever ( weight loss) and unexpected weight change.   HENT: Negative for congestion, ear discharge, ear pain, facial swelling and sore throat.    Eyes: Negative for pain, discharge and itching.   Respiratory: Positive for shortness of breath ( at rest and exertion ). Negative for cough, chest tightness and wheezing.    Cardiovascular: Positive for leg swelling ( chronic). Negative for chest pain and palpitations.   Gastrointestinal: Positive for vomiting improved. Negative for abdominal distention, abdominal pain, blood in stool, constipation and diarrhea.   Endocrine: Negative for cold intolerance.   Genitourinary: Negative for dysuria, hematuria and urgency.   Musculoskeletal: Negative for arthralgias, gait problem, myalgias, neck pain and neck stiffness.   Skin: Negative for color change, pallor and rash.   Allergic/Immunologic: Negative for environmental allergies.   Neurological: Positive for weakness and numbness ( bilateral lower extremities and hands attributes to diabetic neuropathy). Negative for dizziness, syncope, light-headedness and headaches.   Hematological: Negative for adenopathy.   Psychiatric/Behavioral: Negative for agitation, behavioral problems and confusion.     OBJECTIVE:     Vital Signs Range (Last 24H):  Temp:  [97.8 °F (36.6 °C)-99.1 °F (37.3 °C)]   Pulse:  []   Resp:  [16-18]   BP: (105-138)/(71-89)   SpO2:  [97 %-99 %]     Physical Exam:  Constitutional: He is oriented to person, place, and time. He appears well-developed and well-nourished.   HENT:   Head: Normocephalic and atraumatic.   Mouth/Throat: Oropharynx is clear and moist.  No oropharyngeal exudate.   Eyes: Pupils are equal, round, and reactive to light. Conjunctivae and EOM are normal. Right eye exhibits no discharge. Left eye exhibits no discharge. No scleral icterus.   Neck: Normal range of motion. Neck supple. No JVD present. No tracheal deviation present. No thyromegaly present.   Cardiovascular: Normal rate, regular rhythm and normal heart sounds. Exam reveals no gallop and no friction rub.   No murmur heard.  Pulmonary/Chest: Effort normal and breath sounds normal. No stridor. No respiratory distress. He has no wheezes. He has no rales.   Abdominal: Soft. Bowel sounds are normal. He exhibits no distension and no mass. There is no tenderness. There is no guarding.   Musculoskeletal: Normal range of motion. He exhibits edema.   Lymphadenopathy:     He has no cervical adenopathy.   Neurological: He is oriented to person, place, and time. No cranial nerve deficit.   Able to move upper and lower extremities without limitation   Skin: Skin is warm and dry.   Left foot:  With wound VAC in place.     Right foot: 1.5cm clean based ulcer at base of right 5th MTP joint. Right great toe and second toe surgically amputated.     Psychiatric: He has a normal mood and affect. His behavior is normal.   Nursing note and vitals reviewed    Medications:  Medication list was reviewed and changes noted under Assessment/Plan.      Current Facility-Administered Medications:     acetaminophen tablet 650 mg, 650 mg, Oral, Q6H PRN, Vicente Wick MD, 650 mg at 07/23/19 1938    amLODIPine tablet 10 mg, 10 mg, Oral, Daily, Vicente Wick MD, 10 mg at 07/26/19 0931    atorvastatin tablet 80 mg, 80 mg, Oral, Daily, Vicente Wick MD, 80 mg at 07/26/19 0931    calcium carbonate 200 mg calcium (500 mg) chewable tablet 1,000 mg, 1,000 mg, Oral, BID PRN, Antonio Tate PA-C, 1,000 mg at 07/25/19 1601    ceFAZolin (ANCEF) 6 g in dextrose 5 % 500 mL CONTINUOUS INFUSION, 6 g, Intravenous, Q24H,  RAMILA Kinsey, 6 g at 07/26/19 1301    dextrose 10% (D10W) Bolus, 12.5 g, Intravenous, PRN, Nakul Patel MD    dextrose 10% (D10W) Bolus, 25 g, Intravenous, PRN, Nakul Patel MD    enoxaparin injection 40 mg, 40 mg, Subcutaneous, Daily, Vicente Wick MD, 40 mg at 07/25/19 1601    glucagon (human recombinant) injection 1 mg, 1 mg, Intramuscular, PRN, Nakul Patel MD    glucose chewable tablet 16 g, 16 g, Oral, PRN, Nakul Patel MD    glucose chewable tablet 24 g, 24 g, Oral, PRN, Nakul Patel MD    hydrALAZINE tablet 25 mg, 25 mg, Oral, Q8H PRN, Vicente Wick MD, 25 mg at 07/23/19 0804    insulin aspart U-100 pen 0-5 Units, 0-5 Units, Subcutaneous, QID (AC + HS) PRN, Nakul Patel MD, 2 Units at 07/26/19 1144    insulin aspart U-100 pen 11 Units, 11 Units, Subcutaneous, TIDWM, Javid Saini MD    insulin aspart U-100 pen 3 Units, 3 Units, Subcutaneous, PRN, Javid Saini MD    insulin detemir U-100 pen 28 Units, 28 Units, Subcutaneous, QHS, Javid Saini MD, 28 Units at 07/25/19 2227    ondansetron injection 8 mg, 8 mg, Intravenous, Q8H PRN, Zeynep Duke PA-C    potassium, sodium phosphates 280-160-250 mg packet 2 packet, 2 packet, Oral, QID (AC & HS), Vicente Wick MD, 2 packet at 07/26/19 1311    promethazine (PHENERGAN) 12.5 mg in dextrose 5 % 50 mL IVPB, 12.5 mg, Intravenous, Q6H PRN, Zeynep Duke PA-C, Last Rate: 150 mL/hr at 07/18/19 0012, 12.5 mg at 07/18/19 0012    sodium chloride 0.9% flush 10 mL, 10 mL, Intravenous, PRN, Vicente Wick MD    sodium chloride 0.9% flush 10 mL, 10 mL, Intravenous, PRN, Mariela Damon MD    Flushing PICC Protocol, , , Until Discontinued **AND** sodium chloride 0.9% flush 10 mL, 10 mL, Intravenous, Q6H, 10 mL at 07/26/19 1301 **AND** sodium chloride 0.9% flush 10 mL, 10 mL, Intravenous, PRN, Vicente Wick MD    acetaminophen, calcium carbonate, Dextrose 10% Bolus,  Dextrose 10% Bolus, glucagon (human recombinant), glucose, glucose, hydrALAZINE, insulin aspart U-100, insulin aspart U-100, ondansetron, promethazine (PHENERGAN) IVPB, sodium chloride 0.9%, sodium chloride 0.9%, Flushing PICC Protocol **AND** sodium chloride 0.9% **AND** sodium chloride 0.9%    Laboratory/Diagnostic Data:  Reviewed and noted in plan where applicable- Please see chart for full lab data.    Recent Labs   Lab 07/24/19 0352 07/25/19 0326 07/26/19  0526   WBC 6.36 8.19 7.63   HGB 10.4* 11.2* 11.6*   HCT 33.8* 35.7* 37.3*    340 363*       Recent Labs   Lab 07/24/19 0352 07/24/19 2318 07/25/19 0326 07/25/19  0858 07/26/19  0526      < > 134*  --  138 136   K 3.8   < > 4.1  --  3.7 4.2   CL 97   < > 98  --  97 97   CO2 32*   < > 25  --  30* 29   BUN 12   < > 11  --  9 11   CREATININE 1.1   < > 1.0  --  1.1 1.1   *   < > 455*  --  260* 362*   CALCIUM 8.8   < > 9.2  --  9.7 9.8   MG 1.8  --   --  1.8  --  1.8   PHOS 2.4*  --   --  2.1*  --  2.5*    < > = values in this interval not displayed.       Recent Labs   Lab 07/24/19 2318 07/25/19  0858 07/26/19  0526   ALKPHOS 119 134 149*   ALT 5* 5* 7*   AST 18 16 18   ALBUMIN 2.5* 2.8* 2.9*   PROT 7.4 8.2 8.1   BILITOT 0.2 0.2 0.2        Microbiology labs for the last week  Microbiology Results (last 7 days)     Procedure Component Value Units Date/Time    Culture, Anaerobe [[id number]] Collected:  07/17/19 1214    Order Status:  Completed Specimen:  Wound from Foot, Left Updated:  07/26/19 1316     Anaerobic Culture No anaerobes isolated    Narrative:       Left foot wound    Fungus culture [[id number]] Collected:  07/17/19 1214    Order Status:  Completed Specimen:  Wound from Foot, Left Updated:  07/24/19 1044     Fungus (Mycology) Culture Culture in progress    Narrative:       Left foot wound    Blood culture [[id number]] Collected:  07/15/19 0305    Order Status:  Completed Specimen:  Blood from Antecubital, Right Updated:  07/20/19  0822     Blood Culture, Routine No growth after 5 days.    Blood culture [[id number]] Collected:  07/15/19 0305    Order Status:  Completed Specimen:  Blood from Antecubital, Right Updated:  07/20/19 0822     Blood Culture, Routine No growth after 5 days.           Imaging Results          MRI Foot (Forefoot) Left Without Contrast (Final result)  Result time 07/12/19 19:07:38    Final result by Bob Oglesby MD (07/12/19 19:07:38)             Impression:      Ulceration involving the lateral plantar aspect of the distal left foot, with findings concerning for exposed bone involving the distal aspect of the 5th metatarsal and proximal phalange of the 5th toe.  There are associated changes concerning for osteomyelitis involving the distal aspect of the 5th metatarsal and entirety of the right toe.  There is osseous hyperemia involving the proximal phalange of the 2nd toe, early changes of osteomyelitis are a consideration no definite low signal on T1 at this time.      Electronically signed by: Bob Oglesby MD  Date:    07/12/2019  Time:    19:07           Narrative:    EXAMINATION:  MRI FOOT (FOREFOOT) LEFT WITHOUT CONTRAST    CLINICAL HISTORY:  Open wound of ankle, foot and toes;    TECHNIQUE:  Multiplanar multisequence MRI images of the left forefoot were obtained without administration of IV contrast.    COMPARISON:  Radiograph 07/12/2019    FINDINGS:  There is soft tissue ulceration involving the lateral plantar aspect of the right foot, noting there appears to be exposed bone in the location, likely involving the distal aspect of the 5th metatarsal head, and proximal phalange of the 5th toe. There is diffusely increased STIR signal within the distal aspect of the 5th metatarsal, and throughout the phalanges of the 5th toe. There is corresponding low T1 signal involving the same regions, concerning for osteomyelitis. There is additional abnormal STIR signal within the base of the proximal phalange of the  2nd toe, without convincing low signal on T1, suggesting osseous hyperemia although early changes of osteomyelitis remain a consideration. There is edema and induration about the open wound, no convincing focal organized fluid collection.  The remainder of the osseous structures are grossly unremarkable. No significant joint effusions.  There is reactive edema about the intrinsic musculature of the foot, as well as along the dorsal surface.                              X-Ray Foot Complete Left (Final result)  Result time 07/12/19 11:06:16    Final result by Chandan Montana MD (07/12/19 11:06:16)             Impression:      Abnormal examination with findings strongly suggesting osteomyelitis involving the base of the proximal phalanx of the left 5th toe and likely the head of the 5th metatarsal as well.    This report was flagged in Epic as abnormal.      Electronically signed by: Chandan Montana MD  Date:    07/12/2019  Time:    11:06           Narrative:    EXAMINATION:  XR FOOT COMPLETE 3 VIEW LEFT    TECHNIQUE:  Three views of the left foot were obtained, with AP, lateral, and oblique projections submitted.    COMPARISON:  No relevant comparison examinations are currently available.    FINDINGS:  Focal soft tissue loss/irregularity involving the region of the left 5th MTP joint is observed, with gas present within the soft tissues at this level, the findings likely related to a clinically evident ulceration.  There is focal lytic bone destruction involving the base of the proximal phalanx of the 5th toe, and possibly involving the head of the 5th metatarsal as well, this radiographic appearance strongly suggesting osteomyelitis.  The remaining osseous structures appear intact, with no evidence of recent fracture and no additional areas of focal lytic bone destruction noted.  Some arterial vascular calcification in the soft tissues about the ankle is incidentally observed.                             X-Ray Chest AP  "Portable (Final result)  Result time 07/12/19 10:48:19    Final result by Chandan Montana MD (07/12/19 10:48:19)             Impression:      No significant intrathoracic abnormality.  Allowing for differences in projection and a poorer inspiratory depth level on the current examination, there has been no significant detrimental interval change in the appearance of the chest since 05/03/2016.      Electronically signed by: Chandan Montana MD  Date:    07/12/2019  Time:    10:48           Narrative:    EXAMINATION:  XR CHEST AP PORTABLE    CLINICAL HISTORY:  hyperglycemia;    COMPARISON:  Comparison is made to the most recent prior chest radiograph, dated 05/03/2016.    FINDINGS:  Heart size is normal, as is the appearance of the pulmonary vascularity.  Lung zones are clear, and free of significant airspace consolidation or volume loss.  No pleural fluid.  No abnormal mediastinal widening.  No pneumothorax.  Incidental note is made of some spurring at the right acromioclavicular joint level.                              Estimated body mass index is 25.39 kg/m² as calculated from the following:    Height as of this encounter: 6' 1" (1.854 m).    Weight as of this encounter: 87.3 kg (192 lb 7.4 oz).    I & O (Last 24H):    Intake/Output Summary (Last 24 hours) at 7/26/2019 1539  Last data filed at 7/26/2019 0400  Gross per 24 hour   Intake 350 ml   Output 750 ml   Net -400 ml       Estimated Creatinine Clearance: 89.8 mL/min (based on SCr of 1.1 mg/dL).    ASSESSMENT/PLAN:     Active Problems:    Active Diagnoses:     Diagnosis Date Noted POA    PRINCIPAL PROBLEM:  Acute osteomyelitis [M86.10] x-ray left foot - strongly suggesting osteomyelitis involving the base of the proximal phalanx of the left 5th toe and likely the head of the 5th metatarsal as well.        recently lost his insurance and has been unable to follow up with wound care.  His last follow-up with primary care provider and podiatrist was about 2 years.  He " has been managing his right foot ulcer at home with dressing but over the past 2 weeks an ulcer on his left foot has developed. He denies any pain or injury- has chronic numbness of bilateral lower extremities and hands from diabetic neuropathy.  Started on IV Zosyn and vanc.  Podiatry and Infectious Disease consult.  aerobic culture with Group B strep.  Add vancomycin for Staph aureus  (monitor for toxicity)    07/14/2019   s/p I&D and 5th ray amputation in the OR  on  07/14/2019.  No weight-bearing left lower x-ray ALISSA ordered to evaluate vascular status intraoperative Cultures pending . clean margin cultures and pathology pending. Deescalated from vanc and zosyn to cefazolin 6 gram IV cont infusion.  blood cultures from 07/13/2019 no growth.  Continues to have nausea vomiting since unable to tolerate anything by mouth.  Started on IV hydration and Reglan    07/23/19  underwent repeat I&D and left partial 5th ray amputation 7/17.   Per OP note,  purulent drainage was expressed proximal to distal from the level of the plantar aspect of the left calcaneus to the open wound on the plantar midfoot. Purulent drainage was also manually expressed from the plantar distal aspect of the forefoot. Cultures of this were sent.   A small portion of the distal metatarsal was reported as sent as a clean margin to Micro and pathology.  The wound was partially closed, Neox allograft and wound Vac placed.  Repeat blood cultures remain NGTD.   ABIs without significant PAD.   Tolerating oral diet without nausea and vomiting.  Status post PICC line placement on 07/23/2019.  Wound VAC in place to left foot.  PT evaluation, crutches ordered per Podiatry.  No weight-bearing to left foot . If all infection removed in the OR and clean margins negative, anticipate 2 weeks of IV antibiotics for bacteremia and skin and soft tissue infection from date of surgery, 7/17.   If there is concern for residual osteomyelitis, will need 6 weeks of IV  therapy.plan on minimum of 14 days of abx, with ID follow-up for final duration 07/12/2019 Yes    Sepsis [A41.9] /bacteremia - blood cultures from 07/12/2019 group B Streptococcus. secondary to diabetic foot ulcer.  Elevated ESR greater than 120 As above 07/12/2019 Yes    Leukocytosis [D72.829] 14 secondary to sepsis. resolved  07/12/2019 Unknown    Anemia [D64.9] hemoglobin at 11.2, stable, normocytic.  Monitor 07/12/2019 Unknown    Type 2 diabetes mellitus with left eye affected by mild nonproliferative retinopathy without macular edema, without long-term current use of insulin [E11.3292]At baseline only takes insulin once a day stopped  taking more than a week because of nausea and vomiting and poor oral intake.  Does not check fingersticks regularly at home. recently lost his insurance.  Obtain HbA1c elevated greater than 14.   on Levemir 28 units nightly and NovoLog 11 units t.i.d. with meals 08/11/2017 Yes       Chronic    Diabetic ketoacidosis without coma associated with type 2 diabetes mellitus [E11.10]  Found to be in DKA in the emergency department with beta hydroxybutyrate elevated at 4.1.  Started on insulin drip.  Received normal saline in the emergency department    07/13/2019  Anion gap resolved- bicarbonate 23, insulin drip discontinued.        hypomagnesemia replaced    Hypophosphatemia- placed   05/30/2017 Unknown    Type 2 diabetes mellitus with diabetic neuropathy, with long-term current use of insulin [E11.40, Z79.4] .  Blood glucose uncontrolled continue with Levemir 28 units q.h.s. and NovoLog 9 units with meals and low-dose correction scale 05/03/2016 Not Applicable       Chronic    Mixed hyperlipidemia [E78.2] continue with Lipitor 08/12/2014 Yes    Essential hypertension [I10]  amlodipine dose increased to 10 mg daily.  06/06/2013 Yes       Chronic               Disposition- rehab.  Awaiting insurance authorization. needs peer to peer     DVT prophylaxis addressed with:   Brian Wick MD  Attending Staff Physician  [facility]  pager- [phone]  [facility] - [phone]

## 2019-07-26 NOTE — PROGRESS NOTES
[facility]  Podiatry  Progress Note    Patient Name: Indio Ryder Jr.  MRN: [id number]  Admission Date: 7/12/2019  Hospital Length of Stay: 14 days  Attending Physician: Vicente Wick MD  Primary Care Provider: Lorena Thakur MD     Subjective:     Interval History:   Patient seen at bedside s/p left foot partial 5th ray amputation with irrigation and debridement and application of graft (DOS: 7/17/2019). No acute distress. Denies any pedal pain. Denies calf pain. No new complaints. Dressings to left foot clean, dry, intact. Wound vac to left foot wound intact running @ 125 mmHg continuous pressure. Admits to nausea since ED admission, no vomiting, fever, chills.     7/20 Patient Seen at bedside for dressing change.  Wound vac intact.  Patient denies F/C/N/V.    7/22 Patient Seen at bedside for dressing change.  Wound vac intact.  Patient denies F/C/N/V.    7/24 Patient seen at bedside for dressing change. Wound vac to left foot intact running at 125 mmHg continuous pressure. Patient denies nausea, vomiting, fever, chills. Denies any pain. No acute distress. No new complaints    7/26: Patient Seen at bedside for dressing change.  Wound vac intact.  Patient denies F/C/N/V.    Follow-up For: Procedure(s) (LRB):  DEBRIDEMENT, FOOT with leftt 5th ray partial amputation with Neox Graft (Left)    Post-Operative Day: 9 Day Post-Op    Scheduled Meds:   amLODIPine  10 mg Oral Daily    atorvastatin  80 mg Oral Daily    ceFAZolin(ANCEF) in D5W 500 mL CONTINUOUS INFUSION  6 g Intravenous Q24H    enoxaparin  40 mg Subcutaneous Daily    insulin aspart U-100  11 Units Subcutaneous TIDWM    insulin detemir U-100  28 Units Subcutaneous QHS    potassium, sodium phosphates  2 packet Oral QID (AC & HS)    sodium chloride 0.9%  10 mL Intravenous Q6H     Continuous Infusions:    PRN Meds:acetaminophen, calcium carbonate, Dextrose 10% Bolus, Dextrose 10% Bolus, glucagon (human recombinant), glucose,  glucose, hydrALAZINE, insulin aspart U-100, insulin aspart U-100, ondansetron, promethazine (PHENERGAN) IVPB, sodium chloride 0.9%, sodium chloride 0.9%, Flushing PICC Protocol **AND** sodium chloride 0.9% **AND** sodium chloride 0.9%    Review of Systems   Constitutional: Negative for appetite change, chills and fever.   Eyes: Negative for visual disturbance.   Respiratory: Negative for cough and shortness of breath.    Cardiovascular: Negative for chest pain and leg swelling.   Gastrointestinal: Negative for abdominal pain, constipation, diarrhea, nausea and vomiting.   Genitourinary: Negative for dysuria, frequency and hematuria.   Musculoskeletal: Negative for back pain and myalgias.   Skin: Positive for wound (left foot surgical wound; right foot plantar ulcer). Negative for color change and rash.   Neurological: Negative for dizziness, light-headedness and headaches.   Psychiatric/Behavioral: Negative for agitation and behavioral problems. The patient is not nervous/anxious.      Objective:     Vital Signs (Most Recent):  Temp: 97.9 °F (36.6 °C) (07/26/19 1714)  Pulse: (!) 117 (07/26/19 1731)  Resp: 16 (07/26/19 0846)  BP: 104/62 (07/26/19 1731)  SpO2: 99 % (07/26/19 1714) Vital Signs (24h Range):  Temp:  [97.8 °F (36.6 °C)-99.1 °F (37.3 °C)] 97.9 °F (36.6 °C)  Pulse:  [] 117  Resp:  [16-18] 16  SpO2:  [97 %-99 %] 99 %  BP: ()/(57-89) 104/62     Weight: 87.3 kg (192 lb 7.4 oz)  Body mass index is 25.39 kg/m².    Foot Exam    General  Orientation: alert and oriented to person, place, and time       Right Foot/Ankle     Inspection and Palpation  Tenderness: none   Swelling: none   Skin Exam: ulcer;     Neurovascular  Dorsalis pedis: 1+  Posterior tibial: 1+  Saphenous nerve sensation: diminished  Tibial nerve sensation: diminished  Superficial peroneal nerve sensation: diminished  Deep peroneal nerve sensation: diminished  Sural nerve sensation: diminished      Left Foot/Ankle      Inspection and  Palpation  Ecchymosis: none  Tenderness: none   Swelling: none   Skin Exam: drainage, skin changes and ulcer; (surgical wound 2/2 partial 5th ray amp with irrigation and debridement; proximal plantar sutures remain intact; serosanguinous drainage noted in wound vac cannister; no edema, no erythema, no signs of infection noted)    Neurovascular  Dorsalis pedis: 1+  Posterior tibial: 1+  Saphenous nerve sensation: diminished  Tibial nerve sensation: diminished  Superficial peroneal nerve sensation: diminished  Deep peroneal nerve sensation: diminished  Sural nerve sensation: diminished            Laboratory:  A1C:   Recent Labs   Lab 07/12/19  1023   HGBA1C >14.0*     CBC:   Recent Labs   Lab 07/26/19  0526   WBC 7.63   RBC 4.17*   HGB 11.6*   HCT 37.3*   *   MCV 89   MCH 27.8   MCHC 31.1*     CRP:   No results for input(s): CRP in the last 168 hours.  ESR:   No results for input(s): SEDRATE in the last 168 hours.  Wound Cultures:   Recent Labs   Lab 07/12/19  1257 07/14/19  1728 07/17/19 1214   LABAERO STREPTOCOCCUS AGALACTIAE (GROUP B)  Few  Beta-hemolytic streptococci are routinely susceptible to   penicillins,cephalosporins and carbapenems.  Susceptibility testing not routinely performed  *  STAPHYLOCOCCUS AUREUS  Few  * STREPTOCOCCUS AGALACTIAE (GROUP B)  Rare  Beta-hemolytic streptococci are routinely susceptible to   penicillins,cephalosporins and carbapenems.  Susceptibility testing not routinely performed  Skin janes also present  * Skin janes,  no predominant organism     Microbiology Results (last 7 days)     Procedure Component Value Units Date/Time    Culture, Anaerobe [[id number]] Collected:  07/17/19 1214    Order Status:  Completed Specimen:  Wound from Foot, Left Updated:  07/26/19 1316     Anaerobic Culture No anaerobes isolated    Narrative:       Left foot wound    Fungus culture [[id number]] Collected:  07/17/19 1214    Order Status:  Completed Specimen:  Wound from Foot, Left Updated:   07/24/19 1044     Fungus (Mycology) Culture Culture in progress    Narrative:       Left foot wound    Blood culture [[id number]] Collected:  07/15/19 0305    Order Status:  Completed Specimen:  Blood from Antecubital, Right Updated:  07/20/19 0822     Blood Culture, Routine No growth after 5 days.    Blood culture [[id number]] Collected:  07/15/19 0305    Order Status:  Completed Specimen:  Blood from Antecubital, Right Updated:  07/20/19 0822     Blood Culture, Routine No growth after 5 days.        Specimen (12h ago, onward)    None          Diagnostic Results:  I have reviewed all pertinent imaging results/findings within the past 24 hours.    Clinical Findings:  Left foot with wound vac application intact; Running at 125 mmHg continuous pressure. Proximal sutures intact.    Ulcer Location: left lateral plantar foot  Measurements: 8 cm x 4 cm x 1 cm  Periwound: viable/ maceration  Drainage: serosanguinous.  Base:  80% granular. 20% fibrin., wound graft intact  Signs of infection: None. No erythema, no edema, negative probe to bone    7/26          7/24          7/22              7/20            7/18      7/16      7/15 s/p I&D      7/12 post ED bedside debridement      7/12 pre ED bedside debridement          Assessment/Plan:     Foot ulcer  Mr. Ryder is a 49 y.o M who presented to ED with bilateral foot ulcers and constitutional symptoms of nausea and vomiting. Right plantar foot ulcer remains stable. Continue betadine wet to dry dressings right foot. Left foot ulcer was infected upon evaluation in the ED. Pt s/p I&D (DOS:7/14), s/p partial 5th ray amputation with allograft application and wound vac application (DOS: 7/17/19)  Podiatry will continue to monitor.    Anemia  Managed per hospital medicine    Leukocytosis  resolved    Acute osteomyelitis  Indio Ryder Jr. is a 49 y.o. male  S/P Debridement left foot wound (DOS: 7/14 per Dr. Hickman) with subsequent left foot partial 5th ray amputation with  debridement and washout and application of graft (DOS: 7/17/2019 per Dr. Almonte).    - Wound vac changed today, will continue wound vac at 125 mmHg continuous pressure while inpatient. Podiatry will change wound vac while inpatient  -Abx per ID, appreciate recs  - Non weight bearing left foot.  -recommend PT for gait training.    DC Instructions:  Patient is to follow up with podiatry within 10 days of discharge.  Call [phone]  to make an appointment.  Home health/facility to do wound vac dressing changes every  as follows:  Rinse with saline, pat dry, place adaptic over graft, apply wound vac, place vac on 125mmHg slow continuous.  Dress in cast padding and coban          Type 2 diabetes mellitus with left eye affected by mild nonproliferative retinopathy without macular edema, without long-term current use of insulin  Managed per hospital medicine    Diabetic ketoacidosis without coma associated with type 2 diabetes mellitus  resolved    Type 2 diabetes mellitus with hyperglycemia, with long-term current use of insulin  Managed by hospital medicine/endo team    Mixed hyperlipidemia  Managed per hospital medicine    Essential hypertension  Per primary        Romy Tricia Hi MD  Podiatry  [facility]

## 2019-07-26 NOTE — ASSESSMENT & PLAN NOTE
Indio Ryder Jr. is a 49 y.o. male  S/P Debridement left foot wound (DOS: 7/14 per Dr. 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 Almonte).    - Wound vac changed today, will continue wound vac at 125 mmHg continuous pressure while inpatient. Podiatry will change wound vac while inpatient  -Abx per ID, appreciate recs  - Non weight bearing left foot.  -recommend PT for gait training.    DC Instructions:  Patient is to follow up with podiatry within 10 days of discharge.  Call [phone]  to make an appointment.  Home health/facility to do wound vac dressing changes every  as follows:  Rinse with saline, pat dry, place adaptic over graft, apply wound vac, place vac on 125mmHg slow continuous.  Dress in cast padding and coban

## 2019-07-26 NOTE — PLAN OF CARE
Problem: Adult Inpatient Plan of Care  Goal: Plan of Care Review  Outcome: Ongoing (interventions implemented as appropriate)  Pt AAOx4, VSS, afebrile. No acute changes overnight. Wound vac to left foot draining, dressing C/D/I. Up with assist to bathroom. CBG AC/HS, coverage administered per order. Continuous Ancef infusing through PICC line. No complaints at this time. WCTM.

## 2019-07-26 NOTE — PT/OT/SLP PROGRESS
Physical Therapy Treatment    Patient Name:  Indio Ryder Jr.   MRN:  [id number]    Recommendations:     Discharge Recommendations:  rehabilitation facility   Discharge Equipment Recommendations: walker, rolling, shower chair   Barriers to discharge: decreased functional mobility requiring increased assist      Assessment:     Indio Ryder Jr. is a 49 y.o. male admitted with a medical diagnosis of Bacteremia due to group B Streptococcus.  He presents with the following impairments/functional limitations:  weakness, impaired functional mobilty, impaired balance, impaired endurance, gait instability. Pt progressing well with therapy focusing on gait training this date. Pt still c/o RUE pain but reports gradual decrease since last session. Pt remains fall risk 2/2 LLE NWB and wound vac attached. Pt would benefit from continued skilled acute PT 3x/wk to improve functional mobility.  Recommending pt receive PT services in Rehab setting following discharge from hospital once medically cleared.     Rehab Prognosis: Fair; patient would benefit from acute skilled PT services to address these deficits and reach maximum level of function.    Recent Surgery: Procedure(s) (LRB):  DEBRIDEMENT, FOOT with leftt 5th ray partial amputation with Neox Graft (Left) 9 Days Post-Op    Plan:     During this hospitalization, patient to be seen 3 x/week to address the identified rehab impairments via gait training, neuromuscular re-education, therapeutic activities, therapeutic exercises and progress toward the following goals:    · Plan of Care Expires:  08/24/19    Subjective     Chief Complaint: 5/10 RUE  Patient/Family Comments/goals: return home   Pain/Comfort:  · Pain Rating 1: 5/10  · Location - Side 1: Left  · Location 1: arm      Objective:     Communicated with RN prior to session.  Patient found up in chair with PICC line, wound vac upon PT entry to room.     General Precautions: Standard, fall   Orthopedic Precautions:LLE  non weight bearing   Braces: N/A     Functional Mobility:  · Transfers:     · Sit to Stand:  stand by assistance with rolling walker  · Gait: 22ft CGA with RW no LOB not. No difficulty maintaining LLE NWB  · Balance: Sitting: SBA      Standing: CGA      AM-PAC 6 CLICK MOBILITY  Turning over in bed (including adjusting bedclothes, sheets and blankets)?: 3  Sitting down on and standing up from a chair with arms (e.g., wheelchair, bedside commode, etc.): 3  Moving from lying on back to sitting on the side of the bed?: 3  Moving to and from a bed to a chair (including a wheelchair)?: 3  Need to walk in hospital room?: 3  Climbing 3-5 steps with a railing?: 3  Basic Mobility Total Score: 18       Therapeutic Activities and Exercises:  - TherEX:    Ankle Pumps: x15   LAQ: x15   Hip Flex: x15   - Pt educated on:   -PT roles, expectations, and POC    -Safety with mobility   -Benefits of OOB activities to increase strength and functional mobility    -Performing ther ex for increasing LE ROM and strength   -Discharge recommendations     Patient left up in chair with all lines intact and call button in reach..    GOALS:   Multidisciplinary Problems     Physical Therapy Goals        Problem: Physical Therapy Goal    Goal Priority Disciplines Outcome Goal Variances Interventions   Physical Therapy Goal     PT, PT/OT Ongoing (interventions implemented as appropriate)     Description:  Goals to be met by: 2019    Patient will increase functional independence with mobility by performin. Supine to sit with Modified Walker  2. Sit to supine with Modified Walker  3. Sit to stand transfer with Supervision  4. Gait  x 20 feet with Stand-by Assistance using LRAD  5. Lower extremity exercise program x15 reps per handout, with independence                     Time Tracking:     PT Received On: 19  PT Start Time: 1123     PT Stop Time: 1139  PT Total Time (min): 16 min     Billable Minutes: Gait Training 16        PT/PTA: PT           Rommel Stringer, PT  07/26/2019

## 2019-07-27 LAB
ALBUMIN SERPL BCP-MCNC: 2.5 G/DL (ref 3.5–5.2)
ALBUMIN SERPL BCP-MCNC: 2.5 G/DL (ref 3.5–5.2)
ALP SERPL-CCNC: 138 U/L (ref 55–135)
ALP SERPL-CCNC: 147 U/L (ref 55–135)
ALT SERPL W/O P-5'-P-CCNC: 7 U/L (ref 10–44)
ALT SERPL W/O P-5'-P-CCNC: 8 U/L (ref 10–44)
ANION GAP SERPL CALC-SCNC: 8 MMOL/L (ref 8–16)
ANION GAP SERPL CALC-SCNC: 9 MMOL/L (ref 8–16)
AST SERPL-CCNC: 25 U/L (ref 10–40)
AST SERPL-CCNC: 25 U/L (ref 10–40)
BASOPHILS # BLD AUTO: 0.04 K/UL (ref 0–0.2)
BASOPHILS NFR BLD: 0.5 % (ref 0–1.9)
BILIRUB SERPL-MCNC: 0.2 MG/DL (ref 0.1–1)
BILIRUB SERPL-MCNC: 0.2 MG/DL (ref 0.1–1)
BUN SERPL-MCNC: 13 MG/DL (ref 6–20)
BUN SERPL-MCNC: 16 MG/DL (ref 6–20)
CALCIUM SERPL-MCNC: 9.2 MG/DL (ref 8.7–10.5)
CALCIUM SERPL-MCNC: 9.4 MG/DL (ref 8.7–10.5)
CHLORIDE SERPL-SCNC: 98 MMOL/L (ref 95–110)
CHLORIDE SERPL-SCNC: 98 MMOL/L (ref 95–110)
CO2 SERPL-SCNC: 30 MMOL/L (ref 23–29)
CO2 SERPL-SCNC: 32 MMOL/L (ref 23–29)
CREAT SERPL-MCNC: 1.1 MG/DL (ref 0.5–1.4)
CREAT SERPL-MCNC: 1.1 MG/DL (ref 0.5–1.4)
DIFFERENTIAL METHOD: ABNORMAL
EOSINOPHIL # BLD AUTO: 0.1 K/UL (ref 0–0.5)
EOSINOPHIL NFR BLD: 0.8 % (ref 0–8)
ERYTHROCYTE [DISTWIDTH] IN BLOOD BY AUTOMATED COUNT: 13.3 % (ref 11.5–14.5)
EST. GFR  (AFRICAN AMERICAN): >60 ML/MIN/1.73 M^2
EST. GFR  (AFRICAN AMERICAN): >60 ML/MIN/1.73 M^2
EST. GFR  (NON AFRICAN AMERICAN): >60 ML/MIN/1.73 M^2
EST. GFR  (NON AFRICAN AMERICAN): >60 ML/MIN/1.73 M^2
GLUCOSE SERPL-MCNC: 303 MG/DL (ref 70–110)
GLUCOSE SERPL-MCNC: 360 MG/DL (ref 70–110)
HCT VFR BLD AUTO: 32.5 % (ref 40–54)
HGB BLD-MCNC: 10 G/DL (ref 14–18)
IMM GRANULOCYTES # BLD AUTO: 0.03 K/UL (ref 0–0.04)
IMM GRANULOCYTES NFR BLD AUTO: 0.4 % (ref 0–0.5)
LYMPHOCYTES # BLD AUTO: 2.3 K/UL (ref 1–4.8)
LYMPHOCYTES NFR BLD: 28.4 % (ref 18–48)
MAGNESIUM SERPL-MCNC: 1.6 MG/DL (ref 1.6–2.6)
MCH RBC QN AUTO: 27.2 PG (ref 27–31)
MCHC RBC AUTO-ENTMCNC: 30.8 G/DL (ref 32–36)
MCV RBC AUTO: 89 FL (ref 82–98)
MONOCYTES # BLD AUTO: 0.6 K/UL (ref 0.3–1)
MONOCYTES NFR BLD: 7.7 % (ref 4–15)
NEUTROPHILS # BLD AUTO: 5 K/UL (ref 1.8–7.7)
NEUTROPHILS NFR BLD: 62.2 % (ref 38–73)
NRBC BLD-RTO: 0 /100 WBC
PHOSPHATE SERPL-MCNC: 2.3 MG/DL (ref 2.7–4.5)
PLATELET # BLD AUTO: 292 K/UL (ref 150–350)
PMV BLD AUTO: 10.7 FL (ref 9.2–12.9)
POCT GLUCOSE: 112 MG/DL (ref 70–110)
POCT GLUCOSE: 175 MG/DL (ref 70–110)
POCT GLUCOSE: 193 MG/DL (ref 70–110)
POCT GLUCOSE: 263 MG/DL (ref 70–110)
POTASSIUM SERPL-SCNC: 4.2 MMOL/L (ref 3.5–5.1)
POTASSIUM SERPL-SCNC: 4.5 MMOL/L (ref 3.5–5.1)
PROT SERPL-MCNC: 7 G/DL (ref 6–8.4)
PROT SERPL-MCNC: 7.1 G/DL (ref 6–8.4)
RBC # BLD AUTO: 3.67 M/UL (ref 4.6–6.2)
SODIUM SERPL-SCNC: 137 MMOL/L (ref 136–145)
SODIUM SERPL-SCNC: 138 MMOL/L (ref 136–145)
WBC # BLD AUTO: 7.96 K/UL (ref 3.9–12.7)

## 2019-07-27 PROCEDURE — 84100 ASSAY OF PHOSPHORUS: CPT

## 2019-07-27 PROCEDURE — 83735 ASSAY OF MAGNESIUM: CPT

## 2019-07-27 PROCEDURE — 99222 1ST HOSP IP/OBS MODERATE 55: CPT | Mod: ,,, | Performed by: HOSPITALIST

## 2019-07-27 PROCEDURE — 20600001 HC STEP DOWN PRIVATE ROOM

## 2019-07-27 PROCEDURE — A4216 STERILE WATER/SALINE, 10 ML: HCPCS | Performed by: HOSPITALIST

## 2019-07-27 PROCEDURE — 80053 COMPREHEN METABOLIC PANEL: CPT

## 2019-07-27 PROCEDURE — 36415 COLL VENOUS BLD VENIPUNCTURE: CPT

## 2019-07-27 PROCEDURE — 63600175 PHARM REV CODE 636 W HCPCS: Performed by: HOSPITALIST

## 2019-07-27 PROCEDURE — 25000003 PHARM REV CODE 250: Performed by: HOSPITALIST

## 2019-07-27 PROCEDURE — 63600175 PHARM REV CODE 636 W HCPCS: Performed by: PHYSICIAN ASSISTANT

## 2019-07-27 PROCEDURE — 85025 COMPLETE CBC W/AUTO DIFF WBC: CPT

## 2019-07-27 PROCEDURE — 99499 UNLISTED E&M SERVICE: CPT | Mod: ,,, | Performed by: PHYSICIAN ASSISTANT

## 2019-07-27 PROCEDURE — 99499 NO LOS: ICD-10-PCS | Mod: ,,, | Performed by: PHYSICIAN ASSISTANT

## 2019-07-27 PROCEDURE — 80053 COMPREHEN METABOLIC PANEL: CPT | Mod: 91

## 2019-07-27 PROCEDURE — 99222 PR INITIAL HOSPITAL CARE,LEVL II: ICD-10-PCS | Mod: ,,, | Performed by: HOSPITALIST

## 2019-07-27 RX ORDER — SODIUM,POTASSIUM PHOSPHATES 280-250MG
2 POWDER IN PACKET (EA) ORAL
Status: DISPENSED | OUTPATIENT
Start: 2019-07-27 | End: 2019-07-28

## 2019-07-27 RX ADMIN — POTASSIUM & SODIUM PHOSPHATES POWDER PACK 280-160-250 MG 2 PACKET: 280-160-250 PACK at 04:07

## 2019-07-27 RX ADMIN — INSULIN ASPART 3 UNITS: 100 INJECTION, SOLUTION INTRAVENOUS; SUBCUTANEOUS at 07:07

## 2019-07-27 RX ADMIN — ATORVASTATIN CALCIUM 80 MG: 20 TABLET, FILM COATED ORAL at 09:07

## 2019-07-27 RX ADMIN — AMLODIPINE BESYLATE 10 MG: 10 TABLET ORAL at 09:07

## 2019-07-27 RX ADMIN — CEFAZOLIN 6 G: 1 INJECTION, POWDER, FOR SOLUTION INTRAMUSCULAR; INTRAVENOUS at 12:07

## 2019-07-27 RX ADMIN — INSULIN ASPART 11 UNITS: 100 INJECTION, SOLUTION INTRAVENOUS; SUBCUTANEOUS at 07:07

## 2019-07-27 RX ADMIN — INSULIN ASPART 11 UNITS: 100 INJECTION, SOLUTION INTRAVENOUS; SUBCUTANEOUS at 04:07

## 2019-07-27 RX ADMIN — INSULIN ASPART 11 UNITS: 100 INJECTION, SOLUTION INTRAVENOUS; SUBCUTANEOUS at 11:07

## 2019-07-27 RX ADMIN — Medication 10 ML: at 05:07

## 2019-07-27 RX ADMIN — Medication 10 ML: at 11:07

## 2019-07-27 RX ADMIN — Medication 10 ML: at 12:07

## 2019-07-27 RX ADMIN — ENOXAPARIN SODIUM 40 MG: 100 INJECTION SUBCUTANEOUS at 04:07

## 2019-07-27 RX ADMIN — Medication 10 ML: at 06:07

## 2019-07-27 RX ADMIN — POTASSIUM & SODIUM PHOSPHATES POWDER PACK 280-160-250 MG 2 PACKET: 280-160-250 PACK at 11:07

## 2019-07-27 NOTE — PLAN OF CARE
Endocrinology following for management of diabetes type 2. BGs remain out of goal [phone] likely from increased appetite and snacking. Will increase basal insulin by 20%. Pre-meal insulin was increased yesterday.     Plan:   - Increase Levemir to 33 units qHS   - Continue pre-meal Novolog to 11 unit SC qAC   - Continue low dose correction insulin qAC and HS  - Added a PRN dose of 3 units of Novolog for snacking   - Accuchecks qAC and HS      Discharge Recommendations: TBD, he is awaiting authorization for rehab    Javid Saini MD  Endocrinology Fellow

## 2019-07-27 NOTE — ASSESSMENT & PLAN NOTE
49 year old male with uncontrolled DM and HTN admitted in DKA, infected left foot wound and Group B strep bacteremia.      Initial wound cultures grew GBS and MSSA, finegoldia  Per podiatry note, there was necrosis and purulence tracking down to bone and patient underwent I&D with podiatry 7/14; left 5th met head noted to be spongy. Cultures from met head with Group B strep and peptoniphilus.       He underwent repeat I&D and left partial 5th ray amputation 7/17.   Per OP note,  purulent drainage was expressed proximal to distal from the level of the plantar aspect of the left calcaneus to the open wound on the plantar midfoot. Purulent drainage was also manually expressed from the plantar distal aspect of the forefoot. Cultures of this returned with skin janes. A small portion of the distal metatarsal was reported as sent as a clean margin to Micro and pathology.  Unfortunately micro never received the specimen for cultures. Pathology of is pending. ID consulted for antibiotic recommendations.      Plan:  - Recommend Cefazolin 6 gram IV continuous infusion q 24 hours for 6 weeks from date of surgery as unclear if residual osteomyelitis is present without clean margin culture and pathology results (end date 8/28/19).   - Patient will need ESR, CRP, CBC and CMP to be drawn weekly with results faxed to the ID Department at  [phone]  - Continue wound care per podiatry.  - We will arrange for a follow-up appointment in Infectious Diseases clinic in 2 weeks. Can review pathology results in clinic.   - Prior to discharge, please ensure the patient's follow-up has been scheduled.  If there is still no follow-up scheduled in Infectious Diseases clinic, please send an EPIC message to the Infectious Diseases charge nurse Rhoda Martinez.

## 2019-07-27 NOTE — CONSULTS
[facility]  Infectious Disease  Consult Note    Patient Name: Indio Ryder Jr.  MRN: [id number]  Admission Date: 7/12/2019  Hospital Length of Stay: 15 days  Attending Physician: Vicente Wick MD  Primary Care Provider: Lorena Thakur MD       Inpatient consult to Infectious Diseases  Consult performed by: Cassandra Acuna PA-C  Consult ordered by: Vicente Wick MD        * Bacteremia due to group B Streptococcus     49 year old male with uncontrolled DM and HTN admitted in DKA, infected left foot wound and Group B strep bacteremia.      Initial wound cultures grew GBS and MSSA, finegoldia  Per podiatry note, there was necrosis and purulence tracking down to bone and patient underwent I&D with podiatry 7/14; left 5th met head noted to be spongy. Cultures from met head with Group B strep and peptoniphilus.       He underwent repeat I&D and left partial 5th ray amputation 7/17.   Per OP note,  purulent drainage was expressed proximal to distal from the level of the plantar aspect of the left calcaneus to the open wound on the plantar midfoot. Purulent drainage was also manually expressed from the plantar distal aspect of the forefoot. Cultures of this returned with skin janes. A small portion of the distal metatarsal was reported as sent as a clean margin to Micro and pathology.  Unfortunately micro never received the specimen for cultures. Pathology of is pending. ID consulted for antibiotic recommendations.      Plan:  - Recommend Cefazolin 6 gram IV continuous infusion q 24 hours for 6 weeks from date of surgery as unclear if residual osteomyelitis is present without clean margin culture and pathology results (end date 8/28/19).   - Patient will need ESR, CRP, CBC and CMP to be drawn weekly with results faxed to the ID Department at  [phone]  - Continue wound care per podiatry.  - We will arrange for a follow-up appointment in Infectious Diseases clinic in 2 weeks. Can  review pathology results in clinic.   - Prior to discharge, please ensure the patient's follow-up has been scheduled.  If there is still no follow-up scheduled in Infectious Diseases clinic, please send an EPIC message to the Infectious Diseases charge nurse Rhoda Martinez.        Please call for any questions. Thank you.  Cassandra Acuna PA-C  Phone: [phone]  Pager: [phone]

## 2019-07-27 NOTE — PLAN OF CARE
Problem: Wound  Goal: Optimal Wound Healing  Outcome: Ongoing (interventions implemented as appropriate)  Continued wound vac to left foot in progress. Scant amount of drainage observed.  Pt offered no c/o pain throughout night.  IV prescribed Ancef therapy continues to infuse.

## 2019-07-27 NOTE — PLAN OF CARE
Problem: Adult Inpatient Plan of Care  Goal: Plan of Care Review  Outcome: Ongoing (interventions implemented as appropriate)  POC reviewed with Pt. VSS. No acute changes. A&Ox4. Pt denies pain. Pt wound vac in place with scant drainage. BG checked before meals and insulin given per MD order. PT complains of chronic numbness and tingling in his hands and BLE.   Safety checks preformed. Call light in reach. All questions answered. Will continue to monitor.

## 2019-07-27 NOTE — PROGRESS NOTES
Progress Note  Hospital Medicine    Admit Date: 7/12/2019  Length of Stay:  LOS: 15 days     SUBJECTIVE:         Follow-up For:  Bacteremia due to group B Streptococcus    HPI/Interval history (See H&P for complete P,F,SHx) :   Over view  Indio Ryder Jr. is a 49 y.o. male with a PMH of T2DM (poorly  controlled, with long term insulin use) and HTN who presented to the ED on 7/12/2019 diagnosed with  Vomiting (vomiting for past 4 days, denies sick contacts)   Oriented x3 accompanied by daughter at the bedside.  Mentions that he wears lower extremity stockings for edema. Reportedly formed a new ulcer on the left lateral aspect of the foot about 10 days back.  had 4-5 days of nausea and vomiting. Vomiting is non-bloody, No abdominal pain..  It occurs immediately after eating and he has not been able to keep anything down. Associated with fevers and chills but no objective temperature measurement.  At baseline only takes insulin once a day stop taking more than a week because of nausea and vomiting and poor oral intake.  Does not check fingersticks regularly at home. recently lost his insurance and has been unable to follow up with wound care.  His last follow-up with primary care provider and podiatrist was about 2 years.  He has been managing his right foot ulcer at home with dressing but over the past 2 weeks an ulcer on his left foot has developed. He denies any pain or injury- has chronic numbness of bilateral lower extremities and hands from diabetic neuropathy.  Found to be in DKA in the emergency department with beta hydroxybutyrate elevated at 4.1.  Started on insulin drip    07/13/2019   Temperature of 101.5 last night.  Anion gap resolved- bicarbonate 23, insulin drip discontinued.  Started on Levemir 23 units q.a.m. 7 units as part t.i.d. with meals with low-dose sliding scale.  Diabetic diet and NPO from midnight for possible intervention.  MRI of the foot- Ulceration involving the lateral plantar  aspect of the distal left foot, with findings concerning for exposed bone involving the distal aspect of the 5th metatarsal and proximal phalange of the 5th toe.  There are associated changes concerning for osteomyelitis involving the distal aspect of the 5th metatarsal and entirety of the right toe.  There is osseous hyperemia involving the proximal phalange of the 2nd toe, early changes of osteomyelitis are a consideration . aerobic culture  and blood culture with Group B strep. discontinued vancomycin       07/14/2019  Had urinary retention of 800 mL but spontaneously voided.  Started on transitional insulin drip as NPO from midnight for OR today.  Hypokalemia repeat.  Blood cultures daily until clear.  Aerobic cultures with group B Streptococcus and Staph aureus.  Added vancomycin ( monitor for toxicity)    07/15/2019   s/p I&D and 5th ray amputation in the OR  on  07/14/2019.  ALISSA ordered to evaluate vascular status intraoperative Cultures pending . clean margin cultures and pathology pending. Deescalated from vanc and zosyn to cefazolin 6 gram IV cont infusion.  blood cultures from 07/13/2019 no growth.  Continues to have nausea vomiting since unable to tolerate anything by mouth.  Started on IV hydration and Reglan    07/23/2019  underwent repeat I&D and left partial 5th ray amputation 7/17.   Per OP note,  purulent drainage was expressed proximal to distal from the level of the plantar aspect of the left calcaneus to the open wound on the plantar midfoot. Purulent drainage was also manually expressed from the plantar distal aspect of the forefoot. Cultures of this were sent.   A small portion of the distal metatarsal was reported as sent as a clean margin to Micro and pathology.  The wound was partially closed, Neox allograft and wound Vac placed.  Repeat blood cultures remain NGTD.   ABIs without significant PAD.   Tolerating oral diet without nausea and vomiting.  Status post PICC line placement on  07/23/2019.  Wound VAC in place to left foot.  PT evaluation, crutches ordered per Podiatry.  No weight-bearing to left foot    Interval history  Insurance denied rehab placement.  s/p  peer to peer. ID recommending  Cefazolin 6 gram IV continuous infusion q 24 hours for 6 weeks from date of surgery as unclear if residual osteomyelitis is present without clean margin culture and pathology results (end date 8/28/19). will qualify for SNF per insurance     Review of Systems: List if applicable  Constitutional: Positive for activity change, appetite change (Poor appetite for the last and), chills, fatigue, fever ( weight loss) and unexpected weight change.   HENT: Negative for congestion, ear discharge, ear pain, facial swelling and sore throat.    Eyes: Negative for pain, discharge and itching.   Respiratory: Positive for shortness of breath ( at rest and exertion ). Negative for cough, chest tightness and wheezing.    Cardiovascular: Positive for leg swelling ( chronic). Negative for chest pain and palpitations.   Gastrointestinal: Positive for vomiting improved. Negative for abdominal distention, abdominal pain, blood in stool, constipation and diarrhea.   Endocrine: Negative for cold intolerance.   Genitourinary: Negative for dysuria, hematuria and urgency.   Musculoskeletal: Negative for arthralgias, gait problem, myalgias, neck pain and neck stiffness.   Skin: Negative for color change, pallor and rash.   Allergic/Immunologic: Negative for environmental allergies.   Neurological: Positive for weakness and numbness ( bilateral lower extremities and hands attributes to diabetic neuropathy). Negative for dizziness, syncope, light-headedness and headaches.   Hematological: Negative for adenopathy.   Psychiatric/Behavioral: Negative for agitation, behavioral problems and confusion.     OBJECTIVE:     Vital Signs Range (Last 24H):  Temp:  [97.2 °F (36.2 °C)-98.6 °F (37 °C)]   Pulse:  [100-125]   Resp:  [17-18]   BP:  ()/(57-80)   SpO2:  [95 %-99 %]     Physical Exam:  Constitutional: He is oriented to person, place, and time. He appears well-developed and well-nourished.   HENT:   Head: Normocephalic and atraumatic.   Mouth/Throat: Oropharynx is clear and moist. No oropharyngeal exudate.   Eyes: Pupils are equal, round, and reactive to light. Conjunctivae and EOM are normal. Right eye exhibits no discharge. Left eye exhibits no discharge. No scleral icterus.   Neck: Normal range of motion. Neck supple. No JVD present. No tracheal deviation present. No thyromegaly present.   Cardiovascular: Normal rate, regular rhythm and normal heart sounds. Exam reveals no gallop and no friction rub.   No murmur heard.  Pulmonary/Chest: Effort normal and breath sounds normal. No stridor. No respiratory distress. He has no wheezes. He has no rales.   Abdominal: Soft. Bowel sounds are normal. He exhibits no distension and no mass. There is no tenderness. There is no guarding.   Musculoskeletal: Normal range of motion. He exhibits edema.   Lymphadenopathy:     He has no cervical adenopathy.   Neurological: He is oriented to person, place, and time. No cranial nerve deficit.   Able to move upper and lower extremities without limitation   Skin: Skin is warm and dry.   Left foot:  With wound VAC in place.     Right foot: 1.5cm clean based ulcer at base of right 5th MTP joint. Right great toe and second toe surgically amputated.     Psychiatric: He has a normal mood and affect. His behavior is normal.   Nursing note and vitals reviewed    Medications:  Medication list was reviewed and changes noted under Assessment/Plan.      Current Facility-Administered Medications:     acetaminophen tablet 650 mg, 650 mg, Oral, Q6H PRN, Vicente Wick MD, 650 mg at 07/23/19 1938    amLODIPine tablet 10 mg, 10 mg, Oral, Daily, Vicente Wick MD, 10 mg at 07/27/19 0900    atorvastatin tablet 80 mg, 80 mg, Oral, Daily, Vicente Wick MD, 80 mg at  07/27/19 0900    calcium carbonate 200 mg calcium (500 mg) chewable tablet 1,000 mg, 1,000 mg, Oral, BID PRN, Antonio Tate PA-C, 1,000 mg at 07/25/19 1601    ceFAZolin (ANCEF) 6 g in dextrose 5 % 500 mL CONTINUOUS INFUSION, 6 g, Intravenous, Q24H, RAMILA Kinsey, 6 g at 07/27/19 1243    dextrose 10% (D10W) Bolus, 12.5 g, Intravenous, PRN, Nakul Patel MD    dextrose 10% (D10W) Bolus, 25 g, Intravenous, PRN, Nakul Patel MD    enoxaparin injection 40 mg, 40 mg, Subcutaneous, Daily, Vicente Wick MD, 40 mg at 07/26/19 1729    glucagon (human recombinant) injection 1 mg, 1 mg, Intramuscular, PRN, Nakul Patel MD    glucose chewable tablet 16 g, 16 g, Oral, PRN, Nakul Patel MD    glucose chewable tablet 24 g, 24 g, Oral, PRN, Nakul Patel MD    hydrALAZINE tablet 25 mg, 25 mg, Oral, Q8H PRN, Vicente Wick MD, 25 mg at 07/23/19 0804    insulin aspart U-100 pen 0-5 Units, 0-5 Units, Subcutaneous, QID (AC + HS) PRN, Nakul Patel MD, 3 Units at 07/27/19 0750    insulin aspart U-100 pen 11 Units, 11 Units, Subcutaneous, TIDWM, Javid Saini MD, 11 Units at 07/27/19 1122    insulin aspart U-100 pen 3 Units, 3 Units, Subcutaneous, PRN, Javid Saini MD    insulin detemir U-100 pen 33 Units, 33 Units, Subcutaneous, QHS, Javid Saini MD    ondansetron injection 8 mg, 8 mg, Intravenous, Q8H PRN, Zeynep Duke PA-C    potassium, sodium phosphates 280-160-250 mg packet 2 packet, 2 packet, Oral, QID (AC & HS), Vicente Wick MD, 2 packet at 07/27/19 1118    promethazine (PHENERGAN) 12.5 mg in dextrose 5 % 50 mL IVPB, 12.5 mg, Intravenous, Q6H PRN, Zeynep Duke PA-C, Last Rate: 150 mL/hr at 07/18/19 0012, 12.5 mg at 07/18/19 0012    sodium chloride 0.9% flush 10 mL, 10 mL, Intravenous, PRN, Vicente Wick MD    sodium chloride 0.9% flush 10 mL, 10 mL, Intravenous, PRN, Mariela Damon MD    Flushing PICC Protocol, , ,  Until Discontinued **AND** sodium chloride 0.9% flush 10 mL, 10 mL, Intravenous, Q6H, 10 mL at 07/27/19 1118 **AND** sodium chloride 0.9% flush 10 mL, 10 mL, Intravenous, PRN, Vicente Wick MD    acetaminophen, calcium carbonate, Dextrose 10% Bolus, Dextrose 10% Bolus, glucagon (human recombinant), glucose, glucose, hydrALAZINE, insulin aspart U-100, insulin aspart U-100, ondansetron, promethazine (PHENERGAN) IVPB, sodium chloride 0.9%, sodium chloride 0.9%, Flushing PICC Protocol **AND** sodium chloride 0.9% **AND** sodium chloride 0.9%    Laboratory/Diagnostic Data:  Reviewed and noted in plan where applicable- Please see chart for full lab data.    Recent Labs   Lab 07/25/19 0326 07/26/19 0526 07/27/19  0320   WBC 8.19 7.63 7.96   HGB 11.2* 11.6* 10.0*   HCT 35.7* 37.3* 32.5*    363* 292       Recent Labs   Lab 07/25/19 0326 07/26/19  0526 07/27/19  0320 07/27/19  0908   NA  --    < > 136 137 138   K  --    < > 4.2 4.5 4.2   CL  --    < > 97 98 98   CO2  --    < > 29 30* 32*   BUN  --    < > 11 16 13   CREATININE  --    < > 1.1 1.1 1.1   GLU  --    < > 362* 360* 303*   CALCIUM  --    < > 9.8 9.4 9.2   MG 1.8  --  1.8 1.6  --    PHOS 2.1*  --  2.5* 2.3*  --     < > = values in this interval not displayed.       Recent Labs   Lab 07/26/19 0526 07/27/19 0320 07/27/19  0908   ALKPHOS 149* 138* 147*   ALT 7* 7* 8*   AST 18 25 25   ALBUMIN 2.9* 2.5* 2.5*   PROT 8.1 7.1 7.0   BILITOT 0.2 0.2 0.2        Microbiology labs for the last week  Microbiology Results (last 7 days)     Procedure Component Value Units Date/Time    Culture, Anaerobe [[id number]] Collected:  07/17/19 1214    Order Status:  Completed Specimen:  Wound from Foot, Left Updated:  07/26/19 1316     Anaerobic Culture No anaerobes isolated    Narrative:       Left foot wound    Fungus culture [[id number]] Collected:  07/17/19 1214    Order Status:  Completed Specimen:  Wound from Foot, Left Updated:  07/24/19 1044     Fungus (Mycology)  Culture Culture in progress    Narrative:       Left foot wound           Imaging Results          MRI Foot (Forefoot) Left Without Contrast (Final result)  Result time 07/12/19 19:07:38    Final result by Bob Oglesby MD (07/12/19 19:07:38)             Impression:      Ulceration involving the lateral plantar aspect of the distal left foot, with findings concerning for exposed bone involving the distal aspect of the 5th metatarsal and proximal phalange of the 5th toe.  There are associated changes concerning for osteomyelitis involving the distal aspect of the 5th metatarsal and entirety of the right toe.  There is osseous hyperemia involving the proximal phalange of the 2nd toe, early changes of osteomyelitis are a consideration no definite low signal on T1 at this time.      Electronically signed by: Bob Oglesby MD  Date:    07/12/2019  Time:    19:07           Narrative:    EXAMINATION:  MRI FOOT (FOREFOOT) LEFT WITHOUT CONTRAST    CLINICAL HISTORY:  Open wound of ankle, foot and toes;    TECHNIQUE:  Multiplanar multisequence MRI images of the left forefoot were obtained without administration of IV contrast.    COMPARISON:  Radiograph 07/12/2019    FINDINGS:  There is soft tissue ulceration involving the lateral plantar aspect of the right foot, noting there appears to be exposed bone in the location, likely involving the distal aspect of the 5th metatarsal head, and proximal phalange of the 5th toe. There is diffusely increased STIR signal within the distal aspect of the 5th metatarsal, and throughout the phalanges of the 5th toe. There is corresponding low T1 signal involving the same regions, concerning for osteomyelitis. There is additional abnormal STIR signal within the base of the proximal phalange of the 2nd toe, without convincing low signal on T1, suggesting osseous hyperemia although early changes of osteomyelitis remain a consideration. There is edema and induration about the open wound,  no convincing focal organized fluid collection.  The remainder of the osseous structures are grossly unremarkable. No significant joint effusions.  There is reactive edema about the intrinsic musculature of the foot, as well as along the dorsal surface.                              X-Ray Foot Complete Left (Final result)  Result time 07/12/19 11:06:16    Final result by Chandan Montana MD (07/12/19 11:06:16)             Impression:      Abnormal examination with findings strongly suggesting osteomyelitis involving the base of the proximal phalanx of the left 5th toe and likely the head of the 5th metatarsal as well.    This report was flagged in Epic as abnormal.      Electronically signed by: Chandan Montana MD  Date:    07/12/2019  Time:    11:06           Narrative:    EXAMINATION:  XR FOOT COMPLETE 3 VIEW LEFT    TECHNIQUE:  Three views of the left foot were obtained, with AP, lateral, and oblique projections submitted.    COMPARISON:  No relevant comparison examinations are currently available.    FINDINGS:  Focal soft tissue loss/irregularity involving the region of the left 5th MTP joint is observed, with gas present within the soft tissues at this level, the findings likely related to a clinically evident ulceration.  There is focal lytic bone destruction involving the base of the proximal phalanx of the 5th toe, and possibly involving the head of the 5th metatarsal as well, this radiographic appearance strongly suggesting osteomyelitis.  The remaining osseous structures appear intact, with no evidence of recent fracture and no additional areas of focal lytic bone destruction noted.  Some arterial vascular calcification in the soft tissues about the ankle is incidentally observed.                             X-Ray Chest AP Portable (Final result)  Result time 07/12/19 10:48:19    Final result by Chandan Montana MD (07/12/19 10:48:19)             Impression:      No significant intrathoracic abnormality.  Allowing for  "differences in projection and a poorer inspiratory depth level on the current examination, there has been no significant detrimental interval change in the appearance of the chest since 05/03/2016.      Electronically signed by: Chandan Montana MD  Date:    07/12/2019  Time:    10:48           Narrative:    EXAMINATION:  XR CHEST AP PORTABLE    CLINICAL HISTORY:  hyperglycemia;    COMPARISON:  Comparison is made to the most recent prior chest radiograph, dated 05/03/2016.    FINDINGS:  Heart size is normal, as is the appearance of the pulmonary vascularity.  Lung zones are clear, and free of significant airspace consolidation or volume loss.  No pleural fluid.  No abnormal mediastinal widening.  No pneumothorax.  Incidental note is made of some spurring at the right acromioclavicular joint level.                              Estimated body mass index is 25.74 kg/m² as calculated from the following:    Height as of this encounter: 6' 1" (1.854 m).    Weight as of this encounter: 88.5 kg (195 lb 1.7 oz).    I & O (Last 24H):    Intake/Output Summary (Last 24 hours) at 7/27/2019 1312  Last data filed at 7/27/2019 0300  Gross per 24 hour   Intake 849.6 ml   Output 1800 ml   Net -950.4 ml       Estimated Creatinine Clearance: 89.8 mL/min (based on SCr of 1.1 mg/dL).    ASSESSMENT/PLAN:     Active Problems:    Active Diagnoses:     Diagnosis Date Noted POA    PRINCIPAL PROBLEM:  Acute osteomyelitis [M86.10] x-ray left foot - strongly suggesting osteomyelitis involving the base of the proximal phalanx of the left 5th toe and likely the head of the 5th metatarsal as well.        recently lost his insurance and has been unable to follow up with wound care.  His last follow-up with primary care provider and podiatrist was about 2 years.  He has been managing his right foot ulcer at home with dressing but over the past 2 weeks an ulcer on his left foot has developed. He denies any pain or injury- has chronic numbness of bilateral " lower extremities and hands from diabetic neuropathy.  Started on IV Zosyn and vanc.  Podiatry and Infectious Disease consult.  aerobic culture with Group B strep.  Add vancomycin for Staph aureus  (monitor for toxicity)    07/14/2019   s/p I&D and 5th ray amputation in the OR  on  07/14/2019.  No weight-bearing left lower x-ray ALISSA ordered to evaluate vascular status intraoperative Cultures pending . clean margin cultures and pathology pending. Deescalated from vanc and zosyn to cefazolin 6 gram IV cont infusion.  blood cultures from 07/13/2019 no growth.  Continues to have nausea vomiting since unable to tolerate anything by mouth.  Started on IV hydration and Reglan    07/23/19  underwent repeat I&D and left partial 5th ray amputation 7/17.   Per OP note,  purulent drainage was expressed proximal to distal from the level of the plantar aspect of the left calcaneus to the open wound on the plantar midfoot. Purulent drainage was also manually expressed from the plantar distal aspect of the forefoot. Cultures of this were sent.   A small portion of the distal metatarsal was reported as sent as a clean margin to Micro and pathology.  The wound was partially closed, Neox allograft and wound Vac placed.  Repeat blood cultures remain NGTD.   ABIs without significant PAD.   Tolerating oral diet without nausea and vomiting.  Status post PICC line placement on 07/23/2019.  Wound VAC in place to left foot.  PT evaluation, crutches ordered per Podiatry.  No weight-bearing to left foot .     7/27/19  Insurance denied rehab placement.  s/p  peer to peer. ID recommending  Cefazolin 6 gram IV continuous infusion q 24 hours for 6 weeks from date of surgery as unclear if residual osteomyelitis is present without clean margin culture and pathology results (end date 8/28/19). will qualify for SNF per insurance    07/12/2019 Yes    Sepsis [A41.9] /bacteremia - blood cultures from 07/12/2019 group B Streptococcus. secondary to  diabetic foot ulcer.  Elevated ESR greater than 120 As above 07/12/2019 Yes    Leukocytosis [D72.829] 14 secondary to sepsis. resolved  07/12/2019 Unknown    Anemia [D64.9] hemoglobin at 11.2, stable, normocytic.  Monitor 07/12/2019 Unknown    Type 2 diabetes mellitus with left eye affected by mild nonproliferative retinopathy without macular edema, without long-term current use of insulin [E11.3292]At baseline only takes insulin once a day stopped  taking more than a week because of nausea and vomiting and poor oral intake.  Does not check fingersticks regularly at home. recently lost his insurance.  Obtain HbA1c elevated greater than 14.   on Levemir 28 units nightly and NovoLog 11 units t.i.d. with meals 08/11/2017 Yes       Chronic    Diabetic ketoacidosis without coma associated with type 2 diabetes mellitus [E11.10]  Found to be in DKA in the emergency department with beta hydroxybutyrate elevated at 4.1.  Started on insulin drip.  Received normal saline in the emergency department    07/13/2019  Anion gap resolved- bicarbonate 23, insulin drip discontinued.        hypomagnesemia replaced    Hypophosphatemia- placed   05/30/2017 Unknown    Type 2 diabetes mellitus with diabetic neuropathy, with long-term current use of insulin [E11.40, Z79.4] .  Blood glucose uncontrolled continue with Levemir 33 units q.h.s. and NovoLog 11 units with meals and low-dose correction scale 05/03/2016 Not Applicable       Chronic    Mixed hyperlipidemia [E78.2] continue with Lipitor 08/12/2014 Yes    Essential hypertension [I10]  amlodipine dose increased to 10 mg daily.  06/06/2013 Yes       Chronic               Disposition- SNF    DVT prophylaxis addressed with:  Brian Wick MD  Attending Staff Physician  [facility] Medicine  pager- [phone] [id number] - [phone]

## 2019-07-28 LAB
ALBUMIN SERPL BCP-MCNC: 2.5 G/DL (ref 3.5–5.2)
ALBUMIN SERPL BCP-MCNC: 2.5 G/DL (ref 3.5–5.2)
ALP SERPL-CCNC: 154 U/L (ref 55–135)
ALP SERPL-CCNC: 155 U/L (ref 55–135)
ALT SERPL W/O P-5'-P-CCNC: 10 U/L (ref 10–44)
ALT SERPL W/O P-5'-P-CCNC: 9 U/L (ref 10–44)
ANION GAP SERPL CALC-SCNC: 10 MMOL/L (ref 8–16)
ANION GAP SERPL CALC-SCNC: 11 MMOL/L (ref 8–16)
AST SERPL-CCNC: 29 U/L (ref 10–40)
AST SERPL-CCNC: 29 U/L (ref 10–40)
BASOPHILS # BLD AUTO: 0.03 K/UL (ref 0–0.2)
BASOPHILS NFR BLD: 0.6 % (ref 0–1.9)
BILIRUB SERPL-MCNC: 0.2 MG/DL (ref 0.1–1)
BILIRUB SERPL-MCNC: 0.2 MG/DL (ref 0.1–1)
BUN SERPL-MCNC: 13 MG/DL (ref 6–20)
BUN SERPL-MCNC: 14 MG/DL (ref 6–20)
CALCIUM SERPL-MCNC: 9.6 MG/DL (ref 8.7–10.5)
CALCIUM SERPL-MCNC: 9.8 MG/DL (ref 8.7–10.5)
CHLORIDE SERPL-SCNC: 96 MMOL/L (ref 95–110)
CHLORIDE SERPL-SCNC: 98 MMOL/L (ref 95–110)
CO2 SERPL-SCNC: 30 MMOL/L (ref 23–29)
CO2 SERPL-SCNC: 33 MMOL/L (ref 23–29)
CREAT SERPL-MCNC: 0.8 MG/DL (ref 0.5–1.4)
CREAT SERPL-MCNC: 0.9 MG/DL (ref 0.5–1.4)
DIFFERENTIAL METHOD: ABNORMAL
EOSINOPHIL # BLD AUTO: 0.1 K/UL (ref 0–0.5)
EOSINOPHIL NFR BLD: 1.1 % (ref 0–8)
ERYTHROCYTE [DISTWIDTH] IN BLOOD BY AUTOMATED COUNT: 13.4 % (ref 11.5–14.5)
EST. GFR  (AFRICAN AMERICAN): >60 ML/MIN/1.73 M^2
EST. GFR  (AFRICAN AMERICAN): >60 ML/MIN/1.73 M^2
EST. GFR  (NON AFRICAN AMERICAN): >60 ML/MIN/1.73 M^2
EST. GFR  (NON AFRICAN AMERICAN): >60 ML/MIN/1.73 M^2
GLUCOSE SERPL-MCNC: 183 MG/DL (ref 70–110)
GLUCOSE SERPL-MCNC: 251 MG/DL (ref 70–110)
HCT VFR BLD AUTO: 34.3 % (ref 40–54)
HGB BLD-MCNC: 10.4 G/DL (ref 14–18)
IMM GRANULOCYTES # BLD AUTO: 0.02 K/UL (ref 0–0.04)
IMM GRANULOCYTES NFR BLD AUTO: 0.4 % (ref 0–0.5)
LYMPHOCYTES # BLD AUTO: 1.9 K/UL (ref 1–4.8)
LYMPHOCYTES NFR BLD: 35.6 % (ref 18–48)
MAGNESIUM SERPL-MCNC: 1.6 MG/DL (ref 1.6–2.6)
MCH RBC QN AUTO: 28 PG (ref 27–31)
MCHC RBC AUTO-ENTMCNC: 30.3 G/DL (ref 32–36)
MCV RBC AUTO: 92 FL (ref 82–98)
MONOCYTES # BLD AUTO: 0.5 K/UL (ref 0.3–1)
MONOCYTES NFR BLD: 8.3 % (ref 4–15)
NEUTROPHILS # BLD AUTO: 2.9 K/UL (ref 1.8–7.7)
NEUTROPHILS NFR BLD: 54 % (ref 38–73)
NRBC BLD-RTO: 0 /100 WBC
PHOSPHATE SERPL-MCNC: 4.2 MG/DL (ref 2.7–4.5)
PLATELET # BLD AUTO: 264 K/UL (ref 150–350)
PMV BLD AUTO: 10.4 FL (ref 9.2–12.9)
POCT GLUCOSE: 141 MG/DL (ref 70–110)
POCT GLUCOSE: 142 MG/DL (ref 70–110)
POCT GLUCOSE: 168 MG/DL (ref 70–110)
POCT GLUCOSE: 225 MG/DL (ref 70–110)
POCT GLUCOSE: 232 MG/DL (ref 70–110)
POTASSIUM SERPL-SCNC: 4 MMOL/L (ref 3.5–5.1)
POTASSIUM SERPL-SCNC: 4.4 MMOL/L (ref 3.5–5.1)
PROT SERPL-MCNC: 7.2 G/DL (ref 6–8.4)
PROT SERPL-MCNC: 7.3 G/DL (ref 6–8.4)
RBC # BLD AUTO: 3.72 M/UL (ref 4.6–6.2)
SODIUM SERPL-SCNC: 139 MMOL/L (ref 136–145)
SODIUM SERPL-SCNC: 139 MMOL/L (ref 136–145)
WBC # BLD AUTO: 5.39 K/UL (ref 3.9–12.7)

## 2019-07-28 PROCEDURE — 80053 COMPREHEN METABOLIC PANEL: CPT

## 2019-07-28 PROCEDURE — 63600175 PHARM REV CODE 636 W HCPCS: Performed by: PHYSICIAN ASSISTANT

## 2019-07-28 PROCEDURE — 25000003 PHARM REV CODE 250: Performed by: HOSPITALIST

## 2019-07-28 PROCEDURE — 84100 ASSAY OF PHOSPHORUS: CPT

## 2019-07-28 PROCEDURE — 36415 COLL VENOUS BLD VENIPUNCTURE: CPT

## 2019-07-28 PROCEDURE — 20600001 HC STEP DOWN PRIVATE ROOM

## 2019-07-28 PROCEDURE — 94761 N-INVAS EAR/PLS OXIMETRY MLT: CPT

## 2019-07-28 PROCEDURE — A4216 STERILE WATER/SALINE, 10 ML: HCPCS | Performed by: HOSPITALIST

## 2019-07-28 PROCEDURE — 83735 ASSAY OF MAGNESIUM: CPT

## 2019-07-28 PROCEDURE — 99232 PR SUBSEQUENT HOSPITAL CARE,LEVL II: ICD-10-PCS | Mod: ,,, | Performed by: HOSPITALIST

## 2019-07-28 PROCEDURE — 99232 SBSQ HOSP IP/OBS MODERATE 35: CPT | Mod: ,,, | Performed by: HOSPITALIST

## 2019-07-28 PROCEDURE — 63600175 PHARM REV CODE 636 W HCPCS: Performed by: HOSPITALIST

## 2019-07-28 PROCEDURE — 85025 COMPLETE CBC W/AUTO DIFF WBC: CPT

## 2019-07-28 RX ORDER — GABAPENTIN 300 MG/1
300 CAPSULE ORAL ONCE
Status: COMPLETED | OUTPATIENT
Start: 2019-07-28 | End: 2019-07-28

## 2019-07-28 RX ORDER — GABAPENTIN 300 MG/1
300 CAPSULE ORAL 2 TIMES DAILY
Status: COMPLETED | OUTPATIENT
Start: 2019-07-29 | End: 2019-07-29

## 2019-07-28 RX ORDER — GABAPENTIN 300 MG/1
300 CAPSULE ORAL 3 TIMES DAILY
Status: DISCONTINUED | OUTPATIENT
Start: 2019-07-30 | End: 2019-08-02 | Stop reason: HOSPADM

## 2019-07-28 RX ADMIN — Medication 10 ML: at 05:07

## 2019-07-28 RX ADMIN — ENOXAPARIN SODIUM 40 MG: 100 INJECTION SUBCUTANEOUS at 04:07

## 2019-07-28 RX ADMIN — INSULIN ASPART 11 UNITS: 100 INJECTION, SOLUTION INTRAVENOUS; SUBCUTANEOUS at 04:07

## 2019-07-28 RX ADMIN — INSULIN ASPART 2 UNITS: 100 INJECTION, SOLUTION INTRAVENOUS; SUBCUTANEOUS at 07:07

## 2019-07-28 RX ADMIN — CEFAZOLIN 6 G: 1 INJECTION, POWDER, FOR SOLUTION INTRAMUSCULAR; INTRAVENOUS at 12:07

## 2019-07-28 RX ADMIN — Medication 10 ML: at 12:07

## 2019-07-28 RX ADMIN — AMLODIPINE BESYLATE 10 MG: 10 TABLET ORAL at 08:07

## 2019-07-28 RX ADMIN — Medication 10 ML: at 01:07

## 2019-07-28 RX ADMIN — ATORVASTATIN CALCIUM 80 MG: 20 TABLET, FILM COATED ORAL at 08:07

## 2019-07-28 RX ADMIN — INSULIN ASPART 2 UNITS: 100 INJECTION, SOLUTION INTRAVENOUS; SUBCUTANEOUS at 04:07

## 2019-07-28 RX ADMIN — INSULIN ASPART 11 UNITS: 100 INJECTION, SOLUTION INTRAVENOUS; SUBCUTANEOUS at 07:07

## 2019-07-28 RX ADMIN — GABAPENTIN 300 MG: 300 CAPSULE ORAL at 03:07

## 2019-07-28 RX ADMIN — INSULIN ASPART 11 UNITS: 100 INJECTION, SOLUTION INTRAVENOUS; SUBCUTANEOUS at 11:07

## 2019-07-28 NOTE — PLAN OF CARE
Endocrinology following for management of diabetes type 2. BGs better controlled with insulin increase. Will monitor Bgs today after increased basal insulin last night.    Plan:   - Continue Levemir to 33 units qHS   - Continue pre-meal Novolog to 11 unit SC qAC   - Continue low dose correction insulin qAC and HS  - Novolog 3 units PRN for snacking   - Accuchecks qAC and HS      Discharge Recommendations: TBD, he is awaiting authorization for rehab     Javid Saini MD  Endocrinology Fellow

## 2019-07-28 NOTE — PLAN OF CARE
Problem: Adult Inpatient Plan of Care  Goal: Plan of Care Review  Outcome: Ongoing (interventions implemented as appropriate)  POC reviewed with Pt. VSS. No acute changes. A&Ox4. Wound vac intact. Left foot dressing clean, dry, intact. Pt up in chair. BG checked before meals, insulin given per MD order. Pt denies pain. Safety checks preformed. Call light in reach. All questions answered. Will continue to monitor.

## 2019-07-28 NOTE — PROGRESS NOTES
Progress Note  Hospital Medicine    Admit Date: 7/12/2019  Length of Stay:  LOS: 16 days     SUBJECTIVE:         Follow-up For:  Bacteremia due to group B Streptococcus    HPI/Interval history (See H&P for complete P,F,SHx) :   Over view  Indio Ryder Jr. is a 49 y.o. male with a PMH of T2DM (poorly  controlled, with long term insulin use) and HTN who presented to the ED on 7/12/2019 diagnosed with  Vomiting (vomiting for past 4 days, denies sick contacts)   Oriented x3 accompanied by daughter at the bedside.  Mentions that he wears lower extremity stockings for edema. Reportedly formed a new ulcer on the left lateral aspect of the foot about 10 days back.  had 4-5 days of nausea and vomiting. Vomiting is non-bloody, No abdominal pain..  It occurs immediately after eating and he has not been able to keep anything down. Associated with fevers and chills but no objective temperature measurement.  At baseline only takes insulin once a day stop taking more than a week because of nausea and vomiting and poor oral intake.  Does not check fingersticks regularly at home. recently lost his insurance and has been unable to follow up with wound care.  His last follow-up with primary care provider and podiatrist was about 2 years.  He has been managing his right foot ulcer at home with dressing but over the past 2 weeks an ulcer on his left foot has developed. He denies any pain or injury- has chronic numbness of bilateral lower extremities and hands from diabetic neuropathy.  Found to be in DKA in the emergency department with beta hydroxybutyrate elevated at 4.1.  Started on insulin drip    07/13/2019   Temperature of 101.5 last night.  Anion gap resolved- bicarbonate 23, insulin drip discontinued.  Started on Levemir 23 units q.a.m. 7 units as part t.i.d. with meals with low-dose sliding scale.  Diabetic diet and NPO from midnight for possible intervention.  MRI of the foot- Ulceration involving the lateral plantar  aspect of the distal left foot, with findings concerning for exposed bone involving the distal aspect of the 5th metatarsal and proximal phalange of the 5th toe.  There are associated changes concerning for osteomyelitis involving the distal aspect of the 5th metatarsal and entirety of the right toe.  There is osseous hyperemia involving the proximal phalange of the 2nd toe, early changes of osteomyelitis are a consideration . aerobic culture  and blood culture with Group B strep. discontinued vancomycin       07/14/2019  Had urinary retention of 800 mL but spontaneously voided.  Started on transitional insulin drip as NPO from midnight for OR today.  Hypokalemia repeat.  Blood cultures daily until clear.  Aerobic cultures with group B Streptococcus and Staph aureus.  Added vancomycin ( monitor for toxicity)    07/15/2019   s/p I&D and 5th ray amputation in the OR  on  07/14/2019.  ALISSA ordered to evaluate vascular status intraoperative Cultures pending . clean margin cultures and pathology pending. Deescalated from vanc and zosyn to cefazolin 6 gram IV cont infusion.  blood cultures from 07/13/2019 no growth.  Continues to have nausea vomiting since unable to tolerate anything by mouth.  Started on IV hydration and Reglan    07/23/2019  underwent repeat I&D and left partial 5th ray amputation 7/17.   Per OP note,  purulent drainage was expressed proximal to distal from the level of the plantar aspect of the left calcaneus to the open wound on the plantar midfoot. Purulent drainage was also manually expressed from the plantar distal aspect of the forefoot. Cultures of this were sent.   A small portion of the distal metatarsal was reported as sent as a clean margin to Micro and pathology.  The wound was partially closed, Neox allograft and wound Vac placed.  Repeat blood cultures remain NGTD.   ABIs without significant PAD.   Tolerating oral diet without nausea and vomiting.  Status post PICC line placement on  07/23/2019.  Wound VAC in place to left foot.  PT evaluation, crutches ordered per Podiatry.  No weight-bearing to left foot    7/27/19  Insurance denied rehab placement.  s/p  peer to peer. ID recommending  Cefazolin 6 gram IV continuous infusion q 24 hours for 6 weeks from date of surgery as unclear if residual osteomyelitis is present without clean margin culture and pathology results (end date 8/28/19). will qualify for SNF per insurance     Interval history  Blood sugars controlled . started on gabapentin for neuropathic pain in  hands    Review of Systems: List if applicable  Constitutional: Positive for activity change, appetite change (Poor appetite for the last and), chills, fatigue, fever ( weight loss) and unexpected weight change.   HENT: Negative for congestion, ear discharge, ear pain, facial swelling and sore throat.    Eyes: Negative for pain, discharge and itching.   Respiratory: Positive for shortness of breath ( at rest and exertion ). Negative for cough, chest tightness and wheezing.    Cardiovascular: Positive for leg swelling ( chronic). Negative for chest pain and palpitations.   Gastrointestinal: Positive for vomiting improved. Negative for abdominal distention, abdominal pain, blood in stool, constipation and diarrhea.   Endocrine: Negative for cold intolerance.   Genitourinary: Negative for dysuria, hematuria and urgency.   Musculoskeletal: Negative for arthralgias, gait problem, myalgias, neck pain and neck stiffness.   Skin: Negative for color change, pallor and rash.   Allergic/Immunologic: Negative for environmental allergies.   Neurological: Positive for weakness and numbness ( bilateral lower extremities and hands attributes to diabetic neuropathy). Negative for dizziness, syncope, light-headedness and headaches.   Hematological: Negative for adenopathy.   Psychiatric/Behavioral: Negative for agitation, behavioral problems and confusion.     OBJECTIVE:     Vital Signs Range (Last  24H):  Temp:  [96.8 °F (36 °C)-98.4 °F (36.9 °C)]   Pulse:  []   Resp:  [18]   BP: (122-169)/(69-79)   SpO2:  [95 %-99 %]     Physical Exam:  Constitutional: He is oriented to person, place, and time. He appears well-developed and well-nourished.   HENT:   Head: Normocephalic and atraumatic.   Mouth/Throat: Oropharynx is clear and moist. No oropharyngeal exudate.   Eyes: Pupils are equal, round, and reactive to light. Conjunctivae and EOM are normal. Right eye exhibits no discharge. Left eye exhibits no discharge. No scleral icterus.   Neck: Normal range of motion. Neck supple. No JVD present. No tracheal deviation present. No thyromegaly present.   Cardiovascular: Normal rate, regular rhythm and normal heart sounds. Exam reveals no gallop and no friction rub.   No murmur heard.  Pulmonary/Chest: Effort normal and breath sounds normal. No stridor. No respiratory distress. He has no wheezes. He has no rales.   Abdominal: Soft. Bowel sounds are normal. He exhibits no distension and no mass. There is no tenderness. There is no guarding.   Musculoskeletal: Normal range of motion. He exhibits edema.   Lymphadenopathy:     He has no cervical adenopathy.   Neurological: He is oriented to person, place, and time. No cranial nerve deficit.   Able to move upper and lower extremities without limitation   Skin: Skin is warm and dry.   Left foot:  With wound VAC in place.     Right foot: 1.5cm clean based ulcer at base of right 5th MTP joint. Right great toe and second toe surgically amputated.     Psychiatric: He has a normal mood and affect. His behavior is normal.   Nursing note and vitals reviewed    Medications:  Medication list was reviewed and changes noted under Assessment/Plan.      Current Facility-Administered Medications:     acetaminophen tablet 650 mg, 650 mg, Oral, Q6H PRN, Vicente Wick MD, 650 mg at 07/23/19 1938    amLODIPine tablet 10 mg, 10 mg, Oral, Daily, Vicente Wick MD, 10 mg at  07/28/19 0814    atorvastatin tablet 80 mg, 80 mg, Oral, Daily, Vicente Wick MD, 80 mg at 07/28/19 0813    calcium carbonate 200 mg calcium (500 mg) chewable tablet 1,000 mg, 1,000 mg, Oral, BID PRN, Antonio Tate PA-C, 1,000 mg at 07/25/19 1601    ceFAZolin (ANCEF) 6 g in dextrose 5 % 500 mL CONTINUOUS INFUSION, 6 g, Intravenous, Q24H, RAMILA Kinsey, 6 g at 07/27/19 1243    dextrose 10% (D10W) Bolus, 12.5 g, Intravenous, PRN, Nakul Patel MD    dextrose 10% (D10W) Bolus, 25 g, Intravenous, PRN, Nakul Patel MD    enoxaparin injection 40 mg, 40 mg, Subcutaneous, Daily, Vicente Wick MD, 40 mg at 07/27/19 1627    glucagon (human recombinant) injection 1 mg, 1 mg, Intramuscular, PRN, Nakul Patel MD    glucose chewable tablet 16 g, 16 g, Oral, PRN, Nakul Patel MD    glucose chewable tablet 24 g, 24 g, Oral, PRN, Nakul Patel MD    hydrALAZINE tablet 25 mg, 25 mg, Oral, Q8H PRN, Vicente Wick MD, 25 mg at 07/23/19 0804    insulin aspart U-100 pen 0-5 Units, 0-5 Units, Subcutaneous, QID (AC + HS) PRN, Nakul Patel MD, 2 Units at 07/28/19 0723    insulin aspart U-100 pen 11 Units, 11 Units, Subcutaneous, TIDWM, Javid Saini MD, 11 Units at 07/28/19 0722    insulin aspart U-100 pen 3 Units, 3 Units, Subcutaneous, PRN, Javid Saini MD    insulin detemir U-100 pen 33 Units, 33 Units, Subcutaneous, QHS, Javid Saini MD, 33 Units at 07/27/19 2120    ondansetron injection 8 mg, 8 mg, Intravenous, Q8H PRN, Zeynep Duke PA-C    potassium, sodium phosphates 280-160-250 mg packet 2 packet, 2 packet, Oral, QID (AC & HS), Vicente Wick MD, 2 packet at 07/27/19 1627    promethazine (PHENERGAN) 12.5 mg in dextrose 5 % 50 mL IVPB, 12.5 mg, Intravenous, Q6H PRN, Zeynep Duke PA-C, Last Rate: 150 mL/hr at 07/18/19 0012, 12.5 mg at 07/18/19 0012    sodium chloride 0.9% flush 10 mL, 10 mL, Intravenous, PRN, Vicente Wick,  MD    sodium chloride 0.9% flush 10 mL, 10 mL, Intravenous, PRN, Mariela Damon MD    Flushing PICC Protocol, , , Until Discontinued **AND** sodium chloride 0.9% flush 10 mL, 10 mL, Intravenous, Q6H, 10 mL at 07/28/19 0539 **AND** sodium chloride 0.9% flush 10 mL, 10 mL, Intravenous, PRN, Vicente Wick MD    acetaminophen, calcium carbonate, Dextrose 10% Bolus, Dextrose 10% Bolus, glucagon (human recombinant), glucose, glucose, hydrALAZINE, insulin aspart U-100, insulin aspart U-100, ondansetron, promethazine (PHENERGAN) IVPB, sodium chloride 0.9%, sodium chloride 0.9%, Flushing PICC Protocol **AND** sodium chloride 0.9% **AND** sodium chloride 0.9%    Laboratory/Diagnostic Data:  Reviewed and noted in plan where applicable- Please see chart for full lab data.    Recent Labs   Lab 07/26/19 0526 07/27/19 0320 07/28/19 0519   WBC 7.63 7.96 5.39   HGB 11.6* 10.0* 10.4*   HCT 37.3* 32.5* 34.3*   * 292 264       Recent Labs   Lab 07/26/19 0526 07/27/19 0320 07/27/19 0908 07/27/19 2302 07/28/19 0519    137 138 139 139   K 4.2 4.5 4.2 4.0 4.4   CL 97 98 98 96 98   CO2 29 30* 32* 33* 30*   BUN 11 16 13 14 13   CREATININE 1.1 1.1 1.1 0.8 0.9   * 360* 303* 183* 251*   CALCIUM 9.8 9.4 9.2 9.6 9.8   MG 1.8 1.6  --   --  1.6   PHOS 2.5* 2.3*  --   --  4.2       Recent Labs   Lab 07/27/19  0908 07/27/19  2302 07/28/19 0519   ALKPHOS 147* 154* 155*   ALT 8* 9* 10   AST 25 29 29   ALBUMIN 2.5* 2.5* 2.5*   PROT 7.0 7.2 7.3   BILITOT 0.2 0.2 0.2        Microbiology labs for the last week  Microbiology Results (last 7 days)     Procedure Component Value Units Date/Time    Culture, Anaerobe [[id number]] Collected:  07/17/19 1214    Order Status:  Completed Specimen:  Wound from Foot, Left Updated:  07/26/19 1316     Anaerobic Culture No anaerobes isolated    Narrative:       Left foot wound    Fungus culture [[id number]] Collected:  07/17/19 1214    Order Status:  Completed Specimen:  Wound from  Foot, Left Updated:  07/24/19 1044     Fungus (Mycology) Culture Culture in progress    Narrative:       Left foot wound           Imaging Results          MRI Foot (Forefoot) Left Without Contrast (Final result)  Result time 07/12/19 19:07:38    Final result by Bob Oglesby MD (07/12/19 19:07:38)             Impression:      Ulceration involving the lateral plantar aspect of the distal left foot, with findings concerning for exposed bone involving the distal aspect of the 5th metatarsal and proximal phalange of the 5th toe.  There are associated changes concerning for osteomyelitis involving the distal aspect of the 5th metatarsal and entirety of the right toe.  There is osseous hyperemia involving the proximal phalange of the 2nd toe, early changes of osteomyelitis are a consideration no definite low signal on T1 at this time.      Electronically signed by: Bob Oglesby MD  Date:    07/12/2019  Time:    19:07           Narrative:    EXAMINATION:  MRI FOOT (FOREFOOT) LEFT WITHOUT CONTRAST    CLINICAL HISTORY:  Open wound of ankle, foot and toes;    TECHNIQUE:  Multiplanar multisequence MRI images of the left forefoot were obtained without administration of IV contrast.    COMPARISON:  Radiograph 07/12/2019    FINDINGS:  There is soft tissue ulceration involving the lateral plantar aspect of the right foot, noting there appears to be exposed bone in the location, likely involving the distal aspect of the 5th metatarsal head, and proximal phalange of the 5th toe. There is diffusely increased STIR signal within the distal aspect of the 5th metatarsal, and throughout the phalanges of the 5th toe. There is corresponding low T1 signal involving the same regions, concerning for osteomyelitis. There is additional abnormal STIR signal within the base of the proximal phalange of the 2nd toe, without convincing low signal on T1, suggesting osseous hyperemia although early changes of osteomyelitis remain a  consideration. There is edema and induration about the open wound, no convincing focal organized fluid collection.  The remainder of the osseous structures are grossly unremarkable. No significant joint effusions.  There is reactive edema about the intrinsic musculature of the foot, as well as along the dorsal surface.                              X-Ray Foot Complete Left (Final result)  Result time 07/12/19 11:06:16    Final result by Chandan Montana MD (07/12/19 11:06:16)             Impression:      Abnormal examination with findings strongly suggesting osteomyelitis involving the base of the proximal phalanx of the left 5th toe and likely the head of the 5th metatarsal as well.    This report was flagged in Epic as abnormal.      Electronically signed by: Chandan Montana MD  Date:    07/12/2019  Time:    11:06           Narrative:    EXAMINATION:  XR FOOT COMPLETE 3 VIEW LEFT    TECHNIQUE:  Three views of the left foot were obtained, with AP, lateral, and oblique projections submitted.    COMPARISON:  No relevant comparison examinations are currently available.    FINDINGS:  Focal soft tissue loss/irregularity involving the region of the left 5th MTP joint is observed, with gas present within the soft tissues at this level, the findings likely related to a clinically evident ulceration.  There is focal lytic bone destruction involving the base of the proximal phalanx of the 5th toe, and possibly involving the head of the 5th metatarsal as well, this radiographic appearance strongly suggesting osteomyelitis.  The remaining osseous structures appear intact, with no evidence of recent fracture and no additional areas of focal lytic bone destruction noted.  Some arterial vascular calcification in the soft tissues about the ankle is incidentally observed.                             X-Ray Chest AP Portable (Final result)  Result time 07/12/19 10:48:19    Final result by Chandan Montana MD (07/12/19 10:48:19)              "Impression:      No significant intrathoracic abnormality.  Allowing for differences in projection and a poorer inspiratory depth level on the current examination, there has been no significant detrimental interval change in the appearance of the chest since 05/03/2016.      Electronically signed by: Chandan Montana MD  Date:    07/12/2019  Time:    10:48           Narrative:    EXAMINATION:  XR CHEST AP PORTABLE    CLINICAL HISTORY:  hyperglycemia;    COMPARISON:  Comparison is made to the most recent prior chest radiograph, dated 05/03/2016.    FINDINGS:  Heart size is normal, as is the appearance of the pulmonary vascularity.  Lung zones are clear, and free of significant airspace consolidation or volume loss.  No pleural fluid.  No abnormal mediastinal widening.  No pneumothorax.  Incidental note is made of some spurring at the right acromioclavicular joint level.                              Estimated body mass index is 25.74 kg/m² as calculated from the following:    Height as of this encounter: 6' 1" (1.854 m).    Weight as of this encounter: 88.5 kg (195 lb 1.7 oz).    I & O (Last 24H):    Intake/Output Summary (Last 24 hours) at 7/28/2019 0938  Last data filed at 7/28/2019 0600  Gross per 24 hour   Intake 0 ml   Output 1975 ml   Net -1975 ml       Estimated Creatinine Clearance: 109.7 mL/min (based on SCr of 0.9 mg/dL).    ASSESSMENT/PLAN:     Active Problems:    Active Diagnoses:     Diagnosis Date Noted POA    PRINCIPAL PROBLEM:  Acute osteomyelitis [M86.10] x-ray left foot - strongly suggesting osteomyelitis involving the base of the proximal phalanx of the left 5th toe and likely the head of the 5th metatarsal as well.        recently lost his insurance and has been unable to follow up with wound care.  His last follow-up with primary care provider and podiatrist was about 2 years.  He has been managing his right foot ulcer at home with dressing but over the past 2 weeks an ulcer on his left foot has " developed. He denies any pain or injury- has chronic numbness of bilateral lower extremities and hands from diabetic neuropathy.  Started on IV Zosyn and vanc.  Podiatry and Infectious Disease consult.  aerobic culture with Group B strep.  Add vancomycin for Staph aureus  (monitor for toxicity)    07/14/2019   s/p I&D and 5th ray amputation in the OR  on  07/14/2019.  No weight-bearing left lower x-ray ALISSA ordered to evaluate vascular status intraoperative Cultures pending . clean margin cultures and pathology pending. Deescalated from vanc and zosyn to cefazolin 6 gram IV cont infusion.  blood cultures from 07/13/2019 no growth.  Continues to have nausea vomiting since unable to tolerate anything by mouth.  Started on IV hydration and Reglan    07/23/19  underwent repeat I&D and left partial 5th ray amputation 7/17.   Per OP note,  purulent drainage was expressed proximal to distal from the level of the plantar aspect of the left calcaneus to the open wound on the plantar midfoot. Purulent drainage was also manually expressed from the plantar distal aspect of the forefoot. Cultures of this were sent.   A small portion of the distal metatarsal was reported as sent as a clean margin to Micro and pathology.  The wound was partially closed, Neox allograft and wound Vac placed.  Repeat blood cultures remain NGTD.   ABIs without significant PAD.   Tolerating oral diet without nausea and vomiting.  Status post PICC line placement on 07/23/2019.  Wound VAC in place to left foot.  PT evaluation, crutches ordered per Podiatry.  No weight-bearing to left foot .     7/27/19  Insurance denied rehab placement.  s/p  peer to peer. ID recommending  Cefazolin 6 gram IV continuous infusion q 24 hours for 6 weeks from date of surgery as unclear if residual osteomyelitis is present without clean margin culture and pathology results (end date 8/28/19). will qualify for SNF per insurance    07/12/2019 Yes    Sepsis [A41.9] /bacteremia  - blood cultures from 07/12/2019 group B Streptococcus. secondary to diabetic foot ulcer.  Elevated ESR greater than 120 As above 07/12/2019 Yes    Leukocytosis [D72.829] 14 secondary to sepsis. resolved  07/12/2019 Unknown    Anemia [D64.9] hemoglobin at 10.4, stable, normocytic.  Monitor 07/12/2019 Unknown    Type 2 diabetes mellitus with left eye affected by mild nonproliferative retinopathy without macular edema, without long-term current use of insulin [E11.3292]At baseline only takes insulin once a day stopped  taking more than a week because of nausea and vomiting and poor oral intake.  Does not check fingersticks regularly at home. recently lost his insurance.  Obtain HbA1c elevated greater than 14.   on Levemir 28 units nightly and NovoLog 11 units t.i.d. with meals 08/11/2017 Yes       Chronic    Diabetic ketoacidosis without coma associated with type 2 diabetes mellitus [E11.10]  Found to be in DKA in the emergency department with beta hydroxybutyrate elevated at 4.1.  Started on insulin drip.  Received normal saline in the emergency department    07/13/2019  Anion gap resolved- bicarbonate 23, insulin drip discontinued.        hypomagnesemia replaced    Hypophosphatemia- placed   05/30/2017 Unknown    Type 2 diabetes mellitus with diabetic neuropathy, with long-term current use of insulin [E11.40, Z79.4] .  Blood glucose controlled continue with Levemir 33 units q.h.s. and NovoLog 11 units with meals and low-dose correction scale. started on gabapentin for neuropathic pain in  hands (7/28/19) 05/03/2016 Not Applicable       Chronic    Mixed hyperlipidemia [E78.2] continue with Lipitor 08/12/2014 Yes    Essential hypertension [I10]  amlodipine dose increased to 10 mg daily.  06/06/2013 Yes       Chronic               Disposition- SNF    DVT prophylaxis addressed with:  Brian Wick MD  Attending Staff Physician  Hospital Medicine  pager- [phone] Ryvghnpclop - [phone]

## 2019-07-29 LAB
ALBUMIN SERPL BCP-MCNC: 2.6 G/DL (ref 3.5–5.2)
ALP SERPL-CCNC: 151 U/L (ref 55–135)
ALT SERPL W/O P-5'-P-CCNC: 9 U/L (ref 10–44)
ANION GAP SERPL CALC-SCNC: 9 MMOL/L (ref 8–16)
AST SERPL-CCNC: 17 U/L (ref 10–40)
BASOPHILS # BLD AUTO: 0.04 K/UL (ref 0–0.2)
BASOPHILS NFR BLD: 0.7 % (ref 0–1.9)
BILIRUB SERPL-MCNC: 0.2 MG/DL (ref 0.1–1)
BUN SERPL-MCNC: 19 MG/DL (ref 6–20)
CALCIUM SERPL-MCNC: 9.5 MG/DL (ref 8.7–10.5)
CHLORIDE SERPL-SCNC: 96 MMOL/L (ref 95–110)
CO2 SERPL-SCNC: 32 MMOL/L (ref 23–29)
CREAT SERPL-MCNC: 1.1 MG/DL (ref 0.5–1.4)
DIFFERENTIAL METHOD: ABNORMAL
EOSINOPHIL # BLD AUTO: 0.1 K/UL (ref 0–0.5)
EOSINOPHIL NFR BLD: 1.1 % (ref 0–8)
ERYTHROCYTE [DISTWIDTH] IN BLOOD BY AUTOMATED COUNT: 13.4 % (ref 11.5–14.5)
EST. GFR  (AFRICAN AMERICAN): >60 ML/MIN/1.73 M^2
EST. GFR  (NON AFRICAN AMERICAN): >60 ML/MIN/1.73 M^2
GLUCOSE SERPL-MCNC: 266 MG/DL (ref 70–110)
HCT VFR BLD AUTO: 33.3 % (ref 40–54)
HGB BLD-MCNC: 10.4 G/DL (ref 14–18)
IMM GRANULOCYTES # BLD AUTO: 0.02 K/UL (ref 0–0.04)
IMM GRANULOCYTES NFR BLD AUTO: 0.4 % (ref 0–0.5)
LYMPHOCYTES # BLD AUTO: 2.2 K/UL (ref 1–4.8)
LYMPHOCYTES NFR BLD: 38.9 % (ref 18–48)
MAGNESIUM SERPL-MCNC: 1.6 MG/DL (ref 1.6–2.6)
MCH RBC QN AUTO: 27.8 PG (ref 27–31)
MCHC RBC AUTO-ENTMCNC: 31.2 G/DL (ref 32–36)
MCV RBC AUTO: 89 FL (ref 82–98)
MONOCYTES # BLD AUTO: 0.5 K/UL (ref 0.3–1)
MONOCYTES NFR BLD: 9 % (ref 4–15)
NEUTROPHILS # BLD AUTO: 2.8 K/UL (ref 1.8–7.7)
NEUTROPHILS NFR BLD: 49.9 % (ref 38–73)
NRBC BLD-RTO: 0 /100 WBC
PHOSPHATE SERPL-MCNC: 3.7 MG/DL (ref 2.7–4.5)
PLATELET # BLD AUTO: 267 K/UL (ref 150–350)
PMV BLD AUTO: 10.5 FL (ref 9.2–12.9)
POCT GLUCOSE: 124 MG/DL (ref 70–110)
POCT GLUCOSE: 175 MG/DL (ref 70–110)
POCT GLUCOSE: 221 MG/DL (ref 70–110)
POCT GLUCOSE: 222 MG/DL (ref 70–110)
POTASSIUM SERPL-SCNC: 4.2 MMOL/L (ref 3.5–5.1)
PROT SERPL-MCNC: 7.5 G/DL (ref 6–8.4)
RBC # BLD AUTO: 3.74 M/UL (ref 4.6–6.2)
SODIUM SERPL-SCNC: 137 MMOL/L (ref 136–145)
WBC # BLD AUTO: 5.66 K/UL (ref 3.9–12.7)

## 2019-07-29 PROCEDURE — 97116 GAIT TRAINING THERAPY: CPT

## 2019-07-29 PROCEDURE — 97110 THERAPEUTIC EXERCISES: CPT

## 2019-07-29 PROCEDURE — 63600175 PHARM REV CODE 636 W HCPCS: Performed by: HOSPITALIST

## 2019-07-29 PROCEDURE — 85025 COMPLETE CBC W/AUTO DIFF WBC: CPT

## 2019-07-29 PROCEDURE — 99232 PR SUBSEQUENT HOSPITAL CARE,LEVL II: ICD-10-PCS | Mod: ,,, | Performed by: HOSPITALIST

## 2019-07-29 PROCEDURE — 63600175 PHARM REV CODE 636 W HCPCS: Performed by: PHYSICIAN ASSISTANT

## 2019-07-29 PROCEDURE — 25000003 PHARM REV CODE 250: Performed by: HOSPITALIST

## 2019-07-29 PROCEDURE — 80053 COMPREHEN METABOLIC PANEL: CPT

## 2019-07-29 PROCEDURE — 83735 ASSAY OF MAGNESIUM: CPT

## 2019-07-29 PROCEDURE — 20600001 HC STEP DOWN PRIVATE ROOM

## 2019-07-29 PROCEDURE — 84100 ASSAY OF PHOSPHORUS: CPT

## 2019-07-29 PROCEDURE — 99232 SBSQ HOSP IP/OBS MODERATE 35: CPT | Mod: ,,, | Performed by: HOSPITALIST

## 2019-07-29 PROCEDURE — A4216 STERILE WATER/SALINE, 10 ML: HCPCS | Performed by: HOSPITALIST

## 2019-07-29 RX ORDER — INSULIN ASPART 100 [IU]/ML
12 INJECTION, SOLUTION INTRAVENOUS; SUBCUTANEOUS
Status: DISCONTINUED | OUTPATIENT
Start: 2019-07-29 | End: 2019-07-30

## 2019-07-29 RX ADMIN — ENOXAPARIN SODIUM 40 MG: 100 INJECTION SUBCUTANEOUS at 05:07

## 2019-07-29 RX ADMIN — Medication 10 ML: at 06:07

## 2019-07-29 RX ADMIN — Medication 10 ML: at 12:07

## 2019-07-29 RX ADMIN — INSULIN ASPART 12 UNITS: 100 INJECTION, SOLUTION INTRAVENOUS; SUBCUTANEOUS at 11:07

## 2019-07-29 RX ADMIN — INSULIN ASPART 2 UNITS: 100 INJECTION, SOLUTION INTRAVENOUS; SUBCUTANEOUS at 08:07

## 2019-07-29 RX ADMIN — GABAPENTIN 300 MG: 300 CAPSULE ORAL at 08:07

## 2019-07-29 RX ADMIN — INSULIN ASPART 11 UNITS: 100 INJECTION, SOLUTION INTRAVENOUS; SUBCUTANEOUS at 08:07

## 2019-07-29 RX ADMIN — AMLODIPINE BESYLATE 10 MG: 10 TABLET ORAL at 08:07

## 2019-07-29 RX ADMIN — Medication 10 ML: at 05:07

## 2019-07-29 RX ADMIN — CEFAZOLIN 6 G: 1 INJECTION, POWDER, FOR SOLUTION INTRAMUSCULAR; INTRAVENOUS at 12:07

## 2019-07-29 RX ADMIN — INSULIN ASPART 1 UNITS: 100 INJECTION, SOLUTION INTRAVENOUS; SUBCUTANEOUS at 08:07

## 2019-07-29 RX ADMIN — ATORVASTATIN CALCIUM 80 MG: 20 TABLET, FILM COATED ORAL at 08:07

## 2019-07-29 RX ADMIN — INSULIN ASPART 12 UNITS: 100 INJECTION, SOLUTION INTRAVENOUS; SUBCUTANEOUS at 04:07

## 2019-07-29 NOTE — ASSESSMENT & PLAN NOTE
Indio Ryder Jr. is a 49 y.o. male  S/P Debridement left foot wound (DOS: 7/14 per Dr. 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 Almonte).    - Wound vac changed today. Periwound appears viable but macerated.  Painted periwound with gentian violent and will continue wound vac at 125 mmHg continuous pressure while inpatient. Podiatry will change wound vac while inpatient.   - Per ID: Cefazolin 6 gram IV continuous infusion q 24 hours for 6 weeks from date of surgery as unclear if residual osteomyelitis is present without clean margin culture and pathology results (end date 8/28/19).   - Non weight bearing left foot.  -recommend PT for gait training.    DC Instructions:  Patient is to follow up with podiatry within 10 days of discharge.  Call [phone]  to make an appointment.  Home health/facility to do wound vac dressing changes every  as follows:  Rinse with saline, pat dry, place adaptic over graft, apply wound vac, place vac on 125mmHg slow continuous.  Dress in cast padding and coban

## 2019-07-29 NOTE — PLAN OF CARE
Problem: Adult Inpatient Plan of Care  Goal: Plan of Care Review  Outcome: Ongoing (interventions implemented as appropriate)     07/29/19 0509   Plan of Care Review   Plan of Care Reviewed With patient   Progress no change     POC reviewed with Pt. VSS. No acute changes. A&Ox4. Pt denies pain. Pt wound vac in place with scant drainage. BG Stable, no coverage needed. PT complains of chronic numbness and tingling in his hands and BLE. Safety maintained. Will continue to monitor.

## 2019-07-29 NOTE — PLAN OF CARE
Type 2 DM  -Initially admitted for DKA and left foot ulcer.     Reviewed BG levels and insulin regimen.     Goal BG while inpatient: 140-180 mg/dl  Current BG levels are above goal    Recommendations:   -Increase Levemir to 37 units, hs  -Increase Novolog to 12 units with meals  -Low dose correction  -Novolog 3 units PRN for overnight snacking  -POC AC/HS    Discharge Recommendations: TBD

## 2019-07-29 NOTE — PROGRESS NOTES
[facility]  Podiatry  Progress Note    Patient Name: Indio Ryder Jr.  MRN: [id number]  Admission Date: 7/12/2019  Hospital Length of Stay: 17 days  Attending Physician: Vicente Wick MD  Primary Care Provider: Lorena Thakur MD     Subjective:     Interval History:   Patient seen at bedside s/p left foot partial 5th ray amputation with irrigation and debridement and application of graft (DOS: 7/17/2019). No acute distress. Denies any pedal pain. Denies calf pain. No new complaints. Dressings to left foot clean, dry, intact. Wound vac to left foot wound intact running @ 125 mmHg continuous pressure. Admits to nausea since ED admission, no vomiting, fever, chills.     7/20 Patient Seen at bedside for dressing change.  Wound vac intact.  Patient denies F/C/N/V.    7/22 Patient Seen at bedside for dressing change.  Wound vac intact.  Patient denies F/C/N/V.    7/24 Patient seen at bedside for dressing change. Wound vac to left foot intact running at 125 mmHg continuous pressure. Patient denies nausea, vomiting, fever, chills. Denies any pain. No acute distress. No new complaints    7/26: Patient Seen at bedside for dressing change.  Wound vac intact.  Patient denies F/C/N/V.    7/29: Patient Seen at bedside for dressing change.  Wound vac intact.  Denies any pain. No acute distress. Patient denies F/C/N/V.      Follow-up For: Procedure(s) (LRB):  DEBRIDEMENT, FOOT with leftt 5th ray partial amputation with Neox Graft (Left)    Post-Operative Day: 12 Days Post-Op    Scheduled Meds:   amLODIPine  10 mg Oral Daily    atorvastatin  80 mg Oral Daily    ceFAZolin(ANCEF) in D5W 500 mL CONTINUOUS INFUSION  6 g Intravenous Q24H    enoxaparin  40 mg Subcutaneous Daily    gabapentin  300 mg Oral BID    Followed by    [START ON 7/30/2019] gabapentin  300 mg Oral TID    insulin aspart U-100  12 Units Subcutaneous TIDWM    insulin detemir U-100  37 Units Subcutaneous QHS    sodium chloride 0.9%   10 mL Intravenous Q6H     Continuous Infusions:    PRN Meds:acetaminophen, calcium carbonate, Dextrose 10% Bolus, Dextrose 10% Bolus, glucagon (human recombinant), glucose, glucose, hydrALAZINE, insulin aspart U-100, insulin aspart U-100, ondansetron, promethazine (PHENERGAN) IVPB, sodium chloride 0.9%, sodium chloride 0.9%, Flushing PICC Protocol **AND** sodium chloride 0.9% **AND** sodium chloride 0.9%    Review of Systems   Constitutional: Negative for appetite change, chills and fever.   Eyes: Negative for visual disturbance.   Respiratory: Negative for cough and shortness of breath.    Cardiovascular: Negative for chest pain and leg swelling.   Gastrointestinal: Negative for abdominal pain, constipation, diarrhea, nausea and vomiting.   Genitourinary: Negative for dysuria, frequency and hematuria.   Musculoskeletal: Negative for back pain and myalgias.   Skin: Positive for wound (left foot surgical wound; right foot plantar ulcer). Negative for color change and rash.   Neurological: Negative for dizziness, light-headedness and headaches.   Psychiatric/Behavioral: Negative for agitation and behavioral problems. The patient is not nervous/anxious.      Objective:     Vital Signs (Most Recent):  Temp: 98.7 °F (37.1 °C) (07/29/19 0804)  Pulse: 109 (07/29/19 1119)  Resp: 16 (07/29/19 0804)  BP: 116/75 (07/29/19 0804)  SpO2: 95 % (07/29/19 0804) Vital Signs (24h Range):  Temp:  [98.1 °F (36.7 °C)-98.9 °F (37.2 °C)] 98.7 °F (37.1 °C)  Pulse:  [] 109  Resp:  [16-23] 16  SpO2:  [95 %-99 %] 95 %  BP: ()/(58-79) 116/75     Weight: 88.5 kg (195 lb 1.7 oz)  Body mass index is 25.74 kg/m².    Foot Exam    General  Orientation: alert and oriented to person, place, and time       Right Foot/Ankle     Inspection and Palpation  Tenderness: none   Swelling: none   Skin Exam: ulcer;     Neurovascular  Dorsalis pedis: 1+  Posterior tibial: 1+  Saphenous nerve sensation: diminished  Tibial nerve sensation:  diminished  Superficial peroneal nerve sensation: diminished  Deep peroneal nerve sensation: diminished  Sural nerve sensation: diminished      Left Foot/Ankle      Inspection and Palpation  Ecchymosis: none  Tenderness: none   Swelling: none   Skin Exam: drainage, skin changes and ulcer; (surgical wound 2/2 partial 5th ray amp with irrigation and debridement; proximal plantar sutures remain intact; serosanguinous drainage noted in wound vac cannister; no edema, no erythema, no signs of infection noted)    Neurovascular  Dorsalis pedis: 1+  Posterior tibial: 1+  Saphenous nerve sensation: diminished  Tibial nerve sensation: diminished  Superficial peroneal nerve sensation: diminished  Deep peroneal nerve sensation: diminished  Sural nerve sensation: diminished            Laboratory:  A1C:   Recent Labs   Lab 07/12/19  1023   HGBA1C >14.0*     CBC:   Recent Labs   Lab 07/29/19  0530   WBC 5.66   RBC 3.74*   HGB 10.4*   HCT 33.3*      MCV 89   MCH 27.8   MCHC 31.2*     CRP:   No results for input(s): CRP in the last 168 hours.  ESR:   No results for input(s): SEDRATE in the last 168 hours.  Wound Cultures:   Recent Labs   Lab 07/12/19  1257 07/14/19  1728 07/17/19  1214   LABAERO STREPTOCOCCUS AGALACTIAE (GROUP B)  Few  Beta-hemolytic streptococci are routinely susceptible to   penicillins,cephalosporins and carbapenems.  Susceptibility testing not routinely performed  *  STAPHYLOCOCCUS AUREUS  Few  * STREPTOCOCCUS AGALACTIAE (GROUP B)  Rare  Beta-hemolytic streptococci are routinely susceptible to   penicillins,cephalosporins and carbapenems.  Susceptibility testing not routinely performed  Skin janes also present  * Skin janes,  no predominant organism     Microbiology Results (last 7 days)     Procedure Component Value Units Date/Time    Culture, Anaerobe [[id number]] Collected:  07/17/19 1214    Order Status:  Completed Specimen:  Wound from Foot, Left Updated:  07/26/19 1316     Anaerobic Culture No  anaerobes isolated    Narrative:       Left foot wound    Fungus culture [[id number]] Collected:  07/17/19 1214    Order Status:  Completed Specimen:  Wound from Foot, Left Updated:  07/24/19 1044     Fungus (Mycology) Culture Culture in progress    Narrative:       Left foot wound        Specimen (12h ago, onward)    None          Diagnostic Results:  I have reviewed all pertinent imaging results/findings within the past 24 hours.    Clinical Findings:  Left foot with wound vac application intact; Running at 125 mmHg continuous pressure. Proximal sutures intact.    Ulcer Location: left lateral plantar foot  Measurements: 8 cm x 4 cm x 1 cm  Periwound: viable/ maceration  Drainage: serosanguinous.  Base:  80% granular. 20% fibrin., wound graft intact  Signs of infection: None. No erythema, no edema, negative probe to bone     7/29              7/26          7/24          7/22              7/20            7/18      7/16      7/15 s/p I&D      7/12 post ED bedside debridement      7/12 pre ED bedside debridement          Assessment/Plan:     Foot ulcer  Mr. Ryder is a 49 y.o M who presented to ED with bilateral foot ulcers and constitutional symptoms of nausea and vomiting. Right plantar foot ulcer remains stable. Continue betadine wet to dry dressings right foot. Left foot ulcer was infected upon evaluation in the ED. Pt s/p I&D (DOS:7/14), s/p partial 5th ray amputation with allograft application and wound vac application (DOS: 7/17/19)  Podiatry will continue to monitor.    Anemia  Managed per hospital medicine    Leukocytosis  resolved    Acute osteomyelitis  Indio Ryder Jr. is a 49 y.o. male  S/P Debridement left foot wound (DOS: 7/14 per Dr. 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 Almonte).    - Wound vac changed today. Periwound appears viable but macerated.  Painted periwound with gentian violent and will  continue wound vac at 125 mmHg continuous pressure while inpatient. Podiatry will change wound vac while inpatient.   - Per ID: Cefazolin 6 gram IV continuous infusion q 24 hours for 6 weeks from date of surgery as unclear if residual osteomyelitis is present without clean margin culture and pathology results (end date 8/28/19).   - Non weight bearing left foot.  -recommend PT for gait training.    DC Instructions:  Patient is to follow up with podiatry within 10 days of discharge.  Call [phone]  to make an appointment.  Home health/facility to do wound vac dressing changes every  as follows:  Rinse with saline, pat dry, place adaptic over graft, apply wound vac, place vac on 125mmHg slow continuous.  Dress in cast padding and coban          Sepsis  resolved    Type 2 diabetes mellitus with left eye affected by mild nonproliferative retinopathy without macular edema, without long-term current use of insulin  Managed per hospital medicine    Diabetic ketoacidosis without coma associated with type 2 diabetes mellitus  resolved    Type 2 diabetes mellitus with hyperglycemia, with long-term current use of insulin  Managed by hospital medicine/endo team    Mixed hyperlipidemia  Managed per hospital medicine    Essential hypertension  Per primary        Romy Tricia Hi MD  Podiatry  [facility]

## 2019-07-29 NOTE — PLAN OF CARE
07/29/19 1136   Post-Acute Status   Post-Acute Authorization Placement   Post-Acute Placement Status Referrals Sent     Peer to peer conducted on 07/26 concluded denial of inpatient rehab. Insurance agency has agreed to cover skilled nursing facility placement. SW sent referrals and will follow up.    Josefina Hammond LMSW  [facility]   [id number]

## 2019-07-29 NOTE — PROGRESS NOTES
PM&R consult follow up.  Please see original consult for detailed note.      Recommended IRF; however, insurance denied.  Disposition- SNF.  Will sign off.  Please call with questions/concerns or re-consult if situation changes.    JESUS Adams, FNP-C  Physical Medicine & Rehabilitation   07/29/2019  Spectralink: [id number]

## 2019-07-29 NOTE — SUBJECTIVE & OBJECTIVE
Subjective:     Interval History:   Patient seen at bedside s/p left foot partial 5th ray amputation with irrigation and debridement and application of graft (DOS: 7/17/2019). No acute distress. Denies any pedal pain. Denies calf pain. No new complaints. Dressings to left foot clean, dry, intact. Wound vac to left foot wound intact running @ 125 mmHg continuous pressure. Admits to nausea since ED admission, no vomiting, fever, chills.     7/20 Patient Seen at bedside for dressing change.  Wound vac intact.  Patient denies F/C/N/V.    7/22 Patient Seen at bedside for dressing change.  Wound vac intact.  Patient denies F/C/N/V.    7/24 Patient seen at bedside for dressing change. Wound vac to left foot intact running at 125 mmHg continuous pressure. Patient denies nausea, vomiting, fever, chills. Denies any pain. No acute distress. No new complaints    7/26: Patient Seen at bedside for dressing change.  Wound vac intact.  Patient denies F/C/N/V.    7/29: Patient Seen at bedside for dressing change.  Wound vac intact.  Denies any pain. No acute distress. Patient denies F/C/N/V.      Follow-up For: Procedure(s) (LRB):  DEBRIDEMENT, FOOT with leftt 5th ray partial amputation with Neox Graft (Left)    Post-Operative Day: 12 Days Post-Op    Scheduled Meds:   amLODIPine  10 mg Oral Daily    atorvastatin  80 mg Oral Daily    ceFAZolin(ANCEF) in D5W 500 mL CONTINUOUS INFUSION  6 g Intravenous Q24H    enoxaparin  40 mg Subcutaneous Daily    gabapentin  300 mg Oral BID    Followed by    [START ON 7/30/2019] gabapentin  300 mg Oral TID    insulin aspart U-100  12 Units Subcutaneous TIDWM    insulin detemir U-100  37 Units Subcutaneous QHS    sodium chloride 0.9%  10 mL Intravenous Q6H     Continuous Infusions:    PRN Meds:acetaminophen, calcium carbonate, Dextrose 10% Bolus, Dextrose 10% Bolus, glucagon (human recombinant), glucose, glucose, hydrALAZINE, insulin aspart U-100, insulin aspart U-100, ondansetron,  promethazine (PHENERGAN) IVPB, sodium chloride 0.9%, sodium chloride 0.9%, Flushing PICC Protocol **AND** sodium chloride 0.9% **AND** sodium chloride 0.9%    Review of Systems   Constitutional: Negative for appetite change, chills and fever.   Eyes: Negative for visual disturbance.   Respiratory: Negative for cough and shortness of breath.    Cardiovascular: Negative for chest pain and leg swelling.   Gastrointestinal: Negative for abdominal pain, constipation, diarrhea, nausea and vomiting.   Genitourinary: Negative for dysuria, frequency and hematuria.   Musculoskeletal: Negative for back pain and myalgias.   Skin: Positive for wound (left foot surgical wound; right foot plantar ulcer). Negative for color change and rash.   Neurological: Negative for dizziness, light-headedness and headaches.   Psychiatric/Behavioral: Negative for agitation and behavioral problems. The patient is not nervous/anxious.      Objective:     Vital Signs (Most Recent):  Temp: 98.7 °F (37.1 °C) (07/29/19 0804)  Pulse: 109 (07/29/19 1119)  Resp: 16 (07/29/19 0804)  BP: 116/75 (07/29/19 0804)  SpO2: 95 % (07/29/19 0804) Vital Signs (24h Range):  Temp:  [98.1 °F (36.7 °C)-98.9 °F (37.2 °C)] 98.7 °F (37.1 °C)  Pulse:  [] 109  Resp:  [16-23] 16  SpO2:  [95 %-99 %] 95 %  BP: ()/(58-79) 116/75     Weight: 88.5 kg (195 lb 1.7 oz)  Body mass index is 25.74 kg/m².    Foot Exam    General  Orientation: alert and oriented to person, place, and time       Right Foot/Ankle     Inspection and Palpation  Tenderness: none   Swelling: none   Skin Exam: ulcer;     Neurovascular  Dorsalis pedis: 1+  Posterior tibial: 1+  Saphenous nerve sensation: diminished  Tibial nerve sensation: diminished  Superficial peroneal nerve sensation: diminished  Deep peroneal nerve sensation: diminished  Sural nerve sensation: diminished      Left Foot/Ankle      Inspection and Palpation  Ecchymosis: none  Tenderness: none   Swelling: none   Skin Exam: drainage,  skin changes and ulcer; (surgical wound 2/2 partial 5th ray amp with irrigation and debridement; proximal plantar sutures remain intact; serosanguinous drainage noted in wound vac cannister; no edema, no erythema, no signs of infection noted)    Neurovascular  Dorsalis pedis: 1+  Posterior tibial: 1+  Saphenous nerve sensation: diminished  Tibial nerve sensation: diminished  Superficial peroneal nerve sensation: diminished  Deep peroneal nerve sensation: diminished  Sural nerve sensation: diminished            Laboratory:  A1C:   Recent Labs   Lab 07/12/19  1023   HGBA1C >14.0*     CBC:   Recent Labs   Lab 07/29/19  0530   WBC 5.66   RBC 3.74*   HGB 10.4*   HCT 33.3*      MCV 89   MCH 27.8   MCHC 31.2*     CRP:   No results for input(s): CRP in the last 168 hours.  ESR:   No results for input(s): SEDRATE in the last 168 hours.  Wound Cultures:   Recent Labs   Lab 07/12/19  1257 07/14/19  1728 07/17/19  1214   LABAERO STREPTOCOCCUS AGALACTIAE (GROUP B)  Few  Beta-hemolytic streptococci are routinely susceptible to   penicillins,cephalosporins and carbapenems.  Susceptibility testing not routinely performed  *  STAPHYLOCOCCUS AUREUS  Few  * STREPTOCOCCUS AGALACTIAE (GROUP B)  Rare  Beta-hemolytic streptococci are routinely susceptible to   penicillins,cephalosporins and carbapenems.  Susceptibility testing not routinely performed  Skin janes also present  * Skin janes,  no predominant organism     Microbiology Results (last 7 days)     Procedure Component Value Units Date/Time    Culture, Anaerobe [[id number]] Collected:  07/17/19 1214    Order Status:  Completed Specimen:  Wound from Foot, Left Updated:  07/26/19 1316     Anaerobic Culture No anaerobes isolated    Narrative:       Left foot wound    Fungus culture [[id number]] Collected:  07/17/19 1214    Order Status:  Completed Specimen:  Wound from Foot, Left Updated:  07/24/19 1044     Fungus (Mycology) Culture Culture in progress    Narrative:       Left  foot wound        Specimen (12h ago, onward)    None          Diagnostic Results:  I have reviewed all pertinent imaging results/findings within the past 24 hours.    Clinical Findings:  Left foot with wound vac application intact; Running at 125 mmHg continuous pressure. Proximal sutures intact.    Ulcer Location: left lateral plantar foot  Measurements: 8 cm x 4 cm x 1 cm  Periwound: viable/ maceration  Drainage: serosanguinous.  Base:  80% granular. 20% fibrin., wound graft intact  Signs of infection: None. No erythema, no edema, negative probe to bone     7/29              7/26          7/24          7/22              7/20            7/18      7/16      7/15 s/p I&D      7/12 post ED bedside debridement      7/12 pre ED bedside debridement

## 2019-07-29 NOTE — PT/OT/SLP PROGRESS
Physical Therapy Treatment    Patient Name:  Indio Ryder Jr.   MRN:  [id number]    Recommendations:     Discharge Recommendations:  rehabilitation facility   Discharge Equipment Recommendations: walker, rolling, shower chair   Barriers to discharge: decreased functional mobility requiring increased assistance    Assessment:     Indio Ryder Jr. is a 49 y.o. male admitted with a medical diagnosis of Bacteremia due to group B Streptococcus.  He presents with the following impairments/functional limitations:  weakness, impaired self care skills, impaired balance, decreased coordination, impaired functional mobilty, impaired endurance, gait instability, orthopedic precautions, decreased lower extremity function Pt tolerated treatment well focusing on bed mobility transfers and therex. Pt met 3 goals today, and able to maintain LLE NWB throughout treatment. Pt will continue to benefit from skilled physical therapy services to address impairments listed above.    Rehab Prognosis: Good; patient would benefit from acute skilled PT services to address these deficits and reach maximum level of function.    Recent Surgery: Procedure(s) (LRB):  DEBRIDEMENT, FOOT with leftt 5th ray partial amputation with Neox Graft (Left) 12 Days Post-Op    Plan:     During this hospitalization, patient to be seen 3 x/week to address the identified rehab impairments via gait training, therapeutic activities, therapeutic exercises, neuromuscular re-education and progress toward the following goals:    · Plan of Care Expires:  08/24/19    Subjective     Chief Complaint: no c/o  Patient/Family Comments/goals: no c/o  Pain/Comfort:  · Pain Rating 1: 0/10  · Pain Rating Post-Intervention 1: 0/10      Objective:     Communicated with nursing prior to session.  Patient found HOB elevated with wound vac, PICC line upon PT entry to room.     General Precautions: Standard, fall   Orthopedic Precautions:LLE non weight bearing   Braces: N/A      Functional Mobility:  · Bed Mobility:     · Rolling Left:  modified independence  · Scooting: modified independence  · Supine to Sit: modified independence  · Transfers:     · Sit to Stand:  supervision with rolling walker  · Gait: 20' RW SBA step through gait pattern able to maintain LLE NWB  · Balance: sitting mod I, standing S      AM-PAC 6 CLICK MOBILITY  Turning over in bed (including adjusting bedclothes, sheets and blankets)?: 3  Sitting down on and standing up from a chair with arms (e.g., wheelchair, bedside commode, etc.): 3  Moving from lying on back to sitting on the side of the bed?: 4  Moving to and from a bed to a chair (including a wheelchair)?: 3  Need to walk in hospital room?: 3  Climbing 3-5 steps with a railing?: 3  Basic Mobility Total Score: 19       Therapeutic Activities and Exercises:  Pt assisted in functional mobility as noted above.  Therex seated 2 x10 LAQ, HF, AP, GS, abd/add.    Patient left up in chair with call button in reach..    GOALS:   Multidisciplinary Problems     Physical Therapy Goals        Problem: Physical Therapy Goal    Goal Priority Disciplines Outcome Goal Variances Interventions   Physical Therapy Goal     PT, PT/OT Ongoing (interventions implemented as appropriate)     Description:  Goals to be met by: 2019    Patient will increase functional independence with mobility by performin. Supine to sit with Modified [place] met 2019  2. Sit to supine with Modified [place]  3. Sit to stand transfer with Supervision met 2019  4. Gait  x 20 feet with Stand-by Assistance using LRAD met 2019  5. Lower extremity exercise program x15 reps per handout, with independence                      Time Tracking:     PT Received On: 19  PT Start Time: 920     PT Stop Time: 943  PT Total Time (min): 23 min     Billable Minutes: Gait Training 10 and Therapeutic Activity 13    Treatment Type: Treatment  PT/PTA: PTA     PTA Visit Number: 1      Zeynep Peña, PTA  07/29/2019

## 2019-07-29 NOTE — PROGRESS NOTES
"  [facility]  Adult Nutrition  Consult Note    SUMMARY     Recommendations    1. Change current 1500 kcal ADA diet to 2000 kcal ADA diet.   2. RD to monitor & follow-up.    Goals: PO intake >50%  Nutrition Goal Status: goal met  Communication of RD Recs: reviewed with RN    Reason for Assessment    Reason For Assessment: RD follow-up  Diagnosis: other (see comments)(Bactermeia)  Relevant Medical History: UC, DM  Interdisciplinary Rounds: did not attend    General Information Comments: Pt educated on diabetic diet , no further questions at this time. Pt currently on diabetic diet, consuming % of meals. PTA, pt reports poor appetite x 1 month & UBW of 210 x 6 months (-12%). Pt doesnt want ONS. NFPE complete , pt noted w/ severe malnutrition. Please see PES statement for details.  Nutrition Discharge Planning: Adequate PO intake    Nutrition/Diet History    Patient Reported Diet/Restrictions/Preferences: diabetic diet, general  Spiritual, Cultural Beliefs, Denominational Practices, Values that Affect Care: no  Factors Affecting Nutritional Intake: None identified at this time    Anthropometrics    Temp: 98.7 °F (37.1 °C)  Height Method: Stated  Height: 6' 1" (185.4 cm)  Height (inches): 73 in  Weight Method: Bed Scale  Weight: 88.5 kg (195 lb 1.7 oz)  Weight (lb): 195.11 lb  Ideal Body Weight (IBW), Male: 184 lb  % Ideal Body Weight, Male (lb): 106.04 lb  BMI (Calculated): 25.8  BMI Grade: 25 - 29.9 - overweight  Usual Body Weight (UBW), k.5 kg  % Usual Body Weight: 88.56  % Weight Change From Usual Weight: -11.62 %    Lab/Procedures/Meds    Pertinent Labs Reviewed: reviewed  Pertinent Labs Comments: Gluc 266  Pertinent Medications Reviewed: reviewed  Pertinent Medications Comments: Statin    Estimated/Assessed Needs    Weight Used For Calorie Calculations: 88.5 kg (195 lb 1.7 oz)     Energy Calorie Requirements (kcal): 2243 kcal/d  Energy Need Method: [place]-St Jeor(1.25 PAL) "     Protein Requirements: 106 g/d (1.2 g/kg)  Weight Used For Protein Calculations: 88.5 kg (195 lb 1.7 oz)     Estimated Fluid Requirement Method: other (see comments)(Per MD or 1 mL/kcal)     Nutrition Prescription Ordered    Current Diet Order: 1500 kcal ADA    Evaluation of Received Nutrient/Fluid Intake    Comments: LBM: 7/26    Tolerance: tolerating    Nutrition Risk    Level of Risk/Frequency of Follow-up: (1x/week)     Assessment and Plan    Severe malnutrition    Malnutrition in the context of Chronic Illness/Injury    Related to (etiology):  Inadequate energy intake    Signs and Symptoms (as evidenced by):  Energy Intake: <75% of estimated energy requirement for 1 month  Body Fat Depletion: severe depletion of orbitals and triceps   Muscle Mass Depletion: severe depletion of temples, clavicle region, scapular region and lower extremities   Weight Loss: 12% x 6 months    Interventions/Recommendations (treatment strategy):  Collaboration of nutrition care w/ other providers     Nutrition Diagnosis Status:  Continues     Monitor and Evaluation    Food and Nutrient Intake: energy intake, food and beverage intake  Food and Nutrient Adminstration: diet order  Physical Activity and Function: nutrition-related ADLs and IADLs  Anthropometric Measurements: weight, weight change  Biochemical Data, Medical Tests and Procedures: glucose/endocrine profile, inflammatory profile, lipid profile, gastrointestinal profile, electrolyte and renal panel  Nutrition-Focused Physical Findings: overall appearance     Malnutrition Assessment    Weight Loss (Malnutrition): greater than 10% in 6 months  Energy Intake (Malnutrition): less than 75% for greater than or equal to 1 month  Subcutaneous Fat (Malnutrition): severe depletion  Muscle Mass (Malnutrition): severe depletion   Orbital Region (Subcutaneous Fat Loss): severe depletion  Upper Arm Region (Subcutaneous Fat Loss): severe depletion   [place] Region (Muscle Loss): severe  depletion  Clavicle Bone Region (Muscle Loss): severe depletion  Posterior Calf Region (Muscle Loss): severe depletion     Nutrition Follow-Up    RD Follow-up?: Yes

## 2019-07-29 NOTE — PLAN OF CARE
Problem: Physical Therapy Goal  Goal: Physical Therapy Goal  Goals to be met by: 2019    Patient will increase functional independence with mobility by performin. Supine to sit with Modified [place] met 2019  2. Sit to supine with Modified [place]  3. Sit to stand transfer with Supervision met 2019  4. Gait  x 20 feet with Stand-by Assistance using LRAD met 2019  5. Lower extremity exercise program x15 reps per handout, with independence    Outcome: Ongoing (interventions implemented as appropriate)  Pt met 3 goals. goals remain appropriate

## 2019-07-29 NOTE — PROGRESS NOTES
Progress Note  Hospital Medicine    Admit Date: 7/12/2019  Length of Stay:  LOS: 17 days     SUBJECTIVE:         Follow-up For:  Bacteremia due to group B Streptococcus    HPI/Interval history (See H&P for complete P,F,SHx) :   Over view  Indio Ryder Jr. is a 49 y.o. male with a PMH of T2DM (poorly  controlled, with long term insulin use) and HTN who presented to the ED on 7/12/2019 diagnosed with  Vomiting (vomiting for past 4 days, denies sick contacts)   Oriented x3 accompanied by daughter at the bedside.  Mentions that he wears lower extremity stockings for edema. Reportedly formed a new ulcer on the left lateral aspect of the foot about 10 days back.  had 4-5 days of nausea and vomiting. Vomiting is non-bloody, No abdominal pain..  It occurs immediately after eating and he has not been able to keep anything down. Associated with fevers and chills but no objective temperature measurement.  At baseline only takes insulin once a day stop taking more than a week because of nausea and vomiting and poor oral intake.  Does not check fingersticks regularly at home. recently lost his insurance and has been unable to follow up with wound care.  His last follow-up with primary care provider and podiatrist was about 2 years.  He has been managing his right foot ulcer at home with dressing but over the past 2 weeks an ulcer on his left foot has developed. He denies any pain or injury- has chronic numbness of bilateral lower extremities and hands from diabetic neuropathy.  Found to be in DKA in the emergency department with beta hydroxybutyrate elevated at 4.1.  Started on insulin drip    07/13/2019   Temperature of 101.5 last night.  Anion gap resolved- bicarbonate 23, insulin drip discontinued.  Started on Levemir 23 units q.a.m. 7 units as part t.i.d. with meals with low-dose sliding scale.  Diabetic diet and NPO from midnight for possible intervention.  MRI of the foot- Ulceration involving the lateral plantar  aspect of the distal left foot, with findings concerning for exposed bone involving the distal aspect of the 5th metatarsal and proximal phalange of the 5th toe.  There are associated changes concerning for osteomyelitis involving the distal aspect of the 5th metatarsal and entirety of the right toe.  There is osseous hyperemia involving the proximal phalange of the 2nd toe, early changes of osteomyelitis are a consideration . aerobic culture  and blood culture with Group B strep. discontinued vancomycin       07/14/2019  Had urinary retention of 800 mL but spontaneously voided.  Started on transitional insulin drip as NPO from midnight for OR today.  Hypokalemia repeat.  Blood cultures daily until clear.  Aerobic cultures with group B Streptococcus and Staph aureus.  Added vancomycin ( monitor for toxicity)    07/15/2019   s/p I&D and 5th ray amputation in the OR  on  07/14/2019.  ALISSA ordered to evaluate vascular status intraoperative Cultures pending . clean margin cultures and pathology pending. Deescalated from vanc and zosyn to cefazolin 6 gram IV cont infusion.  blood cultures from 07/13/2019 no growth.  Continues to have nausea vomiting since unable to tolerate anything by mouth.  Started on IV hydration and Reglan    07/23/2019  underwent repeat I&D and left partial 5th ray amputation 7/17.   Per OP note,  purulent drainage was expressed proximal to distal from the level of the plantar aspect of the left calcaneus to the open wound on the plantar midfoot. Purulent drainage was also manually expressed from the plantar distal aspect of the forefoot. Cultures of this were sent.   A small portion of the distal metatarsal was reported as sent as a clean margin to Micro and pathology.  The wound was partially closed, Neox allograft and wound Vac placed.  Repeat blood cultures remain NGTD.   ABIs without significant PAD.   Tolerating oral diet without nausea and vomiting.  Status post PICC line placement on  07/23/2019.  Wound VAC in place to left foot.  PT evaluation, crutches ordered per Podiatry.  No weight-bearing to left foot    7/27/19  Insurance denied rehab placement.  s/p  peer to peer. ID recommending  Cefazolin 6 gram IV continuous infusion q 24 hours for 6 weeks from date of surgery as unclear if residual osteomyelitis is present without clean margin culture and pathology results (end date 8/28/19). will qualify for SNF per insurance     Interval history   . started on gabapentin for neuropathic pain in  hands    Review of Systems: List if applicable  Constitutional: Positive for activity change, appetite change (Poor appetite for the last and), chills, fatigue, fever ( weight loss) and unexpected weight change.   HENT: Negative for congestion, ear discharge, ear pain, facial swelling and sore throat.    Eyes: Negative for pain, discharge and itching.   Respiratory: Positive for shortness of breath ( at rest and exertion ). Negative for cough, chest tightness and wheezing.    Cardiovascular: Positive for leg swelling ( chronic). Negative for chest pain and palpitations.   Gastrointestinal: Positive for vomiting improved. Negative for abdominal distention, abdominal pain, blood in stool, constipation and diarrhea.   Endocrine: Negative for cold intolerance.   Genitourinary: Negative for dysuria, hematuria and urgency.   Musculoskeletal: Negative for arthralgias, gait problem, myalgias, neck pain and neck stiffness.   Skin: Negative for color change, pallor and rash.   Allergic/Immunologic: Negative for environmental allergies.   Neurological: Positive for weakness and numbness ( bilateral lower extremities and hands attributes to diabetic neuropathy). Negative for dizziness, syncope, light-headedness and headaches.   Hematological: Negative for adenopathy.   Psychiatric/Behavioral: Negative for agitation, behavioral problems and confusion.     OBJECTIVE:     Vital Signs Range (Last 24H):  Temp:  [98 °F (36.7  °C)-98.9 °F (37.2 °C)]   Pulse:  []   Resp:  [16-23]   BP: ()/(58-79)   SpO2:  [95 %-99 %]     Physical Exam:  Constitutional: He is oriented to person, place, and time. He appears well-developed and well-nourished.   HENT:   Head: Normocephalic and atraumatic.   Mouth/Throat: Oropharynx is clear and moist. No oropharyngeal exudate.   Eyes: Pupils are equal, round, and reactive to light. Conjunctivae and EOM are normal. Right eye exhibits no discharge. Left eye exhibits no discharge. No scleral icterus.   Neck: Normal range of motion. Neck supple. No JVD present. No tracheal deviation present. No thyromegaly present.   Cardiovascular: Normal rate, regular rhythm and normal heart sounds. Exam reveals no gallop and no friction rub.   No murmur heard.  Pulmonary/Chest: Effort normal and breath sounds normal. No stridor. No respiratory distress. He has no wheezes. He has no rales.   Abdominal: Soft. Bowel sounds are normal. He exhibits no distension and no mass. There is no tenderness. There is no guarding.   Musculoskeletal: Normal range of motion. He exhibits edema.   Lymphadenopathy:     He has no cervical adenopathy.   Neurological: He is oriented to person, place, and time. No cranial nerve deficit.   Able to move upper and lower extremities without limitation   Skin: Skin is warm and dry.   Left foot:  With wound VAC in place.     Right foot: 1.5cm clean based ulcer at base of right 5th MTP joint. Right great toe and second toe surgically amputated.     Psychiatric: He has a normal mood and affect. His behavior is normal.   Nursing note and vitals reviewed    Medications:  Medication list was reviewed and changes noted under Assessment/Plan.      Current Facility-Administered Medications:     acetaminophen tablet 650 mg, 650 mg, Oral, Q6H PRN, Vicente Wick MD, 650 mg at 07/23/19 1938    amLODIPine tablet 10 mg, 10 mg, Oral, Daily, Vicente Wick MD, 10 mg at 07/29/19 0807    atorvastatin  tablet 80 mg, 80 mg, Oral, Daily, Vicente Wick MD, 80 mg at 07/29/19 0807    calcium carbonate 200 mg calcium (500 mg) chewable tablet 1,000 mg, 1,000 mg, Oral, BID PRN, Antonio Tate PA-C, 1,000 mg at 07/25/19 1601    ceFAZolin (ANCEF) 6 g in dextrose 5 % 500 mL CONTINUOUS INFUSION, 6 g, Intravenous, Q24H, RAMILA Kinsey, 6 g at 07/29/19 1254    dextrose 10% (D10W) Bolus, 12.5 g, Intravenous, PRN, Nakul Patel MD    dextrose 10% (D10W) Bolus, 25 g, Intravenous, PRN, Nakul Patel MD    enoxaparin injection 40 mg, 40 mg, Subcutaneous, Daily, Vicente Wick MD, 40 mg at 07/28/19 1649    [COMPLETED] gabapentin capsule 300 mg, 300 mg, Oral, Once, 300 mg at 07/28/19 1500 **FOLLOWED BY** gabapentin capsule 300 mg, 300 mg, Oral, BID, 300 mg at 07/29/19 0808 **FOLLOWED BY** [START ON 7/30/2019] gabapentin capsule 300 mg, 300 mg, Oral, TID, Vicente Wick MD    glucagon (human recombinant) injection 1 mg, 1 mg, Intramuscular, PRN, Nakul Patel MD    glucose chewable tablet 16 g, 16 g, Oral, PRN, Nakul Patel MD    glucose chewable tablet 24 g, 24 g, Oral, PRN, Nakul Patel MD    hydrALAZINE tablet 25 mg, 25 mg, Oral, Q8H PRN, Vicente Wick MD, 25 mg at 07/23/19 0804    insulin aspart U-100 pen 0-5 Units, 0-5 Units, Subcutaneous, QID (AC + HS) PRN, Nakul Patel MD, 2 Units at 07/29/19 0807    insulin aspart U-100 pen 12 Units, 12 Units, Subcutaneous, TIDWM, Roselyn Chow MD, 12 Units at 07/29/19 1147    insulin aspart U-100 pen 3 Units, 3 Units, Subcutaneous, PRN, Javid Saini MD    insulin detemir U-100 pen 37 Units, 37 Units, Subcutaneous, QHS, Roselyn Chow MD    ondansetron injection 8 mg, 8 mg, Intravenous, Q8H PRN, Zeynep Duke PA-C    promethazine (PHENERGAN) 12.5 mg in dextrose 5 % 50 mL IVPB, 12.5 mg, Intravenous, Q6H PRN, Zeynep Duke PA-C, Last Rate: 150 mL/hr at 07/18/19 0012, 12.5 mg at 07/18/19 0012    sodium chloride 0.9%  flush 10 mL, 10 mL, Intravenous, PRN, Vicente Wick MD    sodium chloride 0.9% flush 10 mL, 10 mL, Intravenous, PRN, Mariela Damon MD    Flushing PICC Protocol, , , Until Discontinued **AND** sodium chloride 0.9% flush 10 mL, 10 mL, Intravenous, Q6H, 10 mL at 07/29/19 1255 **AND** sodium chloride 0.9% flush 10 mL, 10 mL, Intravenous, PRN, Vicente Wick MD    acetaminophen, calcium carbonate, Dextrose 10% Bolus, Dextrose 10% Bolus, glucagon (human recombinant), glucose, glucose, hydrALAZINE, insulin aspart U-100, insulin aspart U-100, ondansetron, promethazine (PHENERGAN) IVPB, sodium chloride 0.9%, sodium chloride 0.9%, Flushing PICC Protocol **AND** sodium chloride 0.9% **AND** sodium chloride 0.9%    Laboratory/Diagnostic Data:  Reviewed and noted in plan where applicable- Please see chart for full lab data.    Recent Labs   Lab 07/27/19 0320 07/28/19 0519 07/29/19  0530   WBC 7.96 5.39 5.66   HGB 10.0* 10.4* 10.4*   HCT 32.5* 34.3* 33.3*    264 267       Recent Labs   Lab 07/27/19 0320 07/27/19 2302 07/28/19 0519 07/29/19  0530      < > 139 139 137   K 4.5   < > 4.0 4.4 4.2   CL 98   < > 96 98 96   CO2 30*   < > 33* 30* 32*   BUN 16   < > 14 13 19   CREATININE 1.1   < > 0.8 0.9 1.1   *   < > 183* 251* 266*   CALCIUM 9.4   < > 9.6 9.8 9.5   MG 1.6  --   --  1.6 1.6   PHOS 2.3*  --   --  4.2 3.7    < > = values in this interval not displayed.       Recent Labs   Lab 07/27/19  2302 07/28/19  0519 07/29/19  0530   ALKPHOS 154* 155* 151*   ALT 9* 10 9*   AST 29 29 17   ALBUMIN 2.5* 2.5* 2.6*   PROT 7.2 7.3 7.5   BILITOT 0.2 0.2 0.2        Microbiology labs for the last week  Microbiology Results (last 7 days)     Procedure Component Value Units Date/Time    Culture, Anaerobe [[id number]] Collected:  07/17/19 1214    Order Status:  Completed Specimen:  Wound from Foot, Left Updated:  07/26/19 1316     Anaerobic Culture No anaerobes isolated    Narrative:       Left foot wound     Fungus culture [[id number]] Collected:  07/17/19 1214    Order Status:  Completed Specimen:  Wound from Foot, Left Updated:  07/24/19 1044     Fungus (Mycology) Culture Culture in progress    Narrative:       Left foot wound           Imaging Results          MRI Foot (Forefoot) Left Without Contrast (Final result)  Result time 07/12/19 19:07:38    Final result by Bob Oglesby MD (07/12/19 19:07:38)             Impression:      Ulceration involving the lateral plantar aspect of the distal left foot, with findings concerning for exposed bone involving the distal aspect of the 5th metatarsal and proximal phalange of the 5th toe.  There are associated changes concerning for osteomyelitis involving the distal aspect of the 5th metatarsal and entirety of the right toe.  There is osseous hyperemia involving the proximal phalange of the 2nd toe, early changes of osteomyelitis are a consideration no definite low signal on T1 at this time.      Electronically signed by: Bob Oglesby MD  Date:    07/12/2019  Time:    19:07           Narrative:    EXAMINATION:  MRI FOOT (FOREFOOT) LEFT WITHOUT CONTRAST    CLINICAL HISTORY:  Open wound of ankle, foot and toes;    TECHNIQUE:  Multiplanar multisequence MRI images of the left forefoot were obtained without administration of IV contrast.    COMPARISON:  Radiograph 07/12/2019    FINDINGS:  There is soft tissue ulceration involving the lateral plantar aspect of the right foot, noting there appears to be exposed bone in the location, likely involving the distal aspect of the 5th metatarsal head, and proximal phalange of the 5th toe. There is diffusely increased STIR signal within the distal aspect of the 5th metatarsal, and throughout the phalanges of the 5th toe. There is corresponding low T1 signal involving the same regions, concerning for osteomyelitis. There is additional abnormal STIR signal within the base of the proximal phalange of the 2nd toe, without convincing  low signal on T1, suggesting osseous hyperemia although early changes of osteomyelitis remain a consideration. There is edema and induration about the open wound, no convincing focal organized fluid collection.  The remainder of the osseous structures are grossly unremarkable. No significant joint effusions.  There is reactive edema about the intrinsic musculature of the foot, as well as along the dorsal surface.                              X-Ray Foot Complete Left (Final result)  Result time 07/12/19 11:06:16    Final result by Chandan Montana MD (07/12/19 11:06:16)             Impression:      Abnormal examination with findings strongly suggesting osteomyelitis involving the base of the proximal phalanx of the left 5th toe and likely the head of the 5th metatarsal as well.    This report was flagged in Epic as abnormal.      Electronically signed by: Chandan Montana MD  Date:    07/12/2019  Time:    11:06           Narrative:    EXAMINATION:  XR FOOT COMPLETE 3 VIEW LEFT    TECHNIQUE:  Three views of the left foot were obtained, with AP, lateral, and oblique projections submitted.    COMPARISON:  No relevant comparison examinations are currently available.    FINDINGS:  Focal soft tissue loss/irregularity involving the region of the left 5th MTP joint is observed, with gas present within the soft tissues at this level, the findings likely related to a clinically evident ulceration.  There is focal lytic bone destruction involving the base of the proximal phalanx of the 5th toe, and possibly involving the head of the 5th metatarsal as well, this radiographic appearance strongly suggesting osteomyelitis.  The remaining osseous structures appear intact, with no evidence of recent fracture and no additional areas of focal lytic bone destruction noted.  Some arterial vascular calcification in the soft tissues about the ankle is incidentally observed.                             X-Ray Chest AP Portable (Final result)  Result  "time 07/12/19 10:48:19    Final result by Chandan Montana MD (07/12/19 10:48:19)             Impression:      No significant intrathoracic abnormality.  Allowing for differences in projection and a poorer inspiratory depth level on the current examination, there has been no significant detrimental interval change in the appearance of the chest since 05/03/2016.      Electronically signed by: Chandan Montana MD  Date:    07/12/2019  Time:    10:48           Narrative:    EXAMINATION:  XR CHEST AP PORTABLE    CLINICAL HISTORY:  hyperglycemia;    COMPARISON:  Comparison is made to the most recent prior chest radiograph, dated 05/03/2016.    FINDINGS:  Heart size is normal, as is the appearance of the pulmonary vascularity.  Lung zones are clear, and free of significant airspace consolidation or volume loss.  No pleural fluid.  No abnormal mediastinal widening.  No pneumothorax.  Incidental note is made of some spurring at the right acromioclavicular joint level.                              Estimated body mass index is 25.74 kg/m² as calculated from the following:    Height as of this encounter: 6' 1" (1.854 m).    Weight as of this encounter: 88.5 kg (195 lb 1.7 oz).    I & O (Last 24H):    Intake/Output Summary (Last 24 hours) at 7/29/2019 1259  Last data filed at 7/29/2019 1254  Gross per 24 hour   Intake 10 ml   Output 800 ml   Net -790 ml       Estimated Creatinine Clearance: 89.8 mL/min (based on SCr of 1.1 mg/dL).    ASSESSMENT/PLAN:     Active Problems:    Active Diagnoses:     Diagnosis Date Noted POA    PRINCIPAL PROBLEM:  Acute osteomyelitis [M86.10] x-ray left foot - strongly suggesting osteomyelitis involving the base of the proximal phalanx of the left 5th toe and likely the head of the 5th metatarsal as well.        recently lost his insurance and has been unable to follow up with wound care.  His last follow-up with primary care provider and podiatrist was about 2 years.  He has been managing his right foot " ulcer at home with dressing but over the past 2 weeks an ulcer on his left foot has developed. He denies any pain or injury- has chronic numbness of bilateral lower extremities and hands from diabetic neuropathy.  Started on IV Zosyn and vanc.  Podiatry and Infectious Disease consult.  aerobic culture with Group B strep.  Add vancomycin for Staph aureus  (monitor for toxicity)    07/14/2019   s/p I&D and 5th ray amputation in the OR  on  07/14/2019.  No weight-bearing left lower x-ray ALISSA ordered to evaluate vascular status intraoperative Cultures pending . clean margin cultures and pathology pending. Deescalated from vanc and zosyn to cefazolin 6 gram IV cont infusion.  blood cultures from 07/13/2019 no growth.  Continues to have nausea vomiting since unable to tolerate anything by mouth.  Started on IV hydration and Reglan    07/23/19  underwent repeat I&D and left partial 5th ray amputation 7/17.   Per OP note,  purulent drainage was expressed proximal to distal from the level of the plantar aspect of the left calcaneus to the open wound on the plantar midfoot. Purulent drainage was also manually expressed from the plantar distal aspect of the forefoot. Cultures of this were sent.   A small portion of the distal metatarsal was reported as sent as a clean margin to Micro and pathology.  The wound was partially closed, Neox allograft and wound Vac placed.  Repeat blood cultures remain NGTD.   ABIs without significant PAD.   Tolerating oral diet without nausea and vomiting.  Status post PICC line placement on 07/23/2019.  Wound VAC in place to left foot.  PT evaluation, crutches ordered per Podiatry.  No weight-bearing to left foot .     7/27/19  Insurance denied rehab placement.  s/p  peer to peer. ID recommending  Cefazolin 6 gram IV continuous infusion q 24 hours for 6 weeks from date of surgery as unclear if residual osteomyelitis is present without clean margin culture and pathology results (end date 8/28/19).  will qualify for SNF per insurance    07/12/2019 Yes    Sepsis [A41.9] /bacteremia - blood cultures from 07/12/2019 group B Streptococcus. secondary to diabetic foot ulcer.  Elevated ESR greater than 120 As above 07/12/2019 Yes    Leukocytosis [D72.829] 14 secondary to sepsis. resolved  07/12/2019 Unknown    Anemia [D64.9] hemoglobin at 10.4, stable, normocytic.  Monitor 07/12/2019 Unknown    Type 2 diabetes mellitus with left eye affected by mild nonproliferative retinopathy without macular edema, without long-term current use of insulin [E11.3292]At baseline only takes insulin once a day stopped  taking more than a week because of nausea and vomiting and poor oral intake.  Does not check fingersticks regularly at home. recently lost his insurance.  Obtain HbA1c elevated greater than 14.   on Levemir 28 units nightly and NovoLog 11 units t.i.d. with meals 08/11/2017 Yes       Chronic    Diabetic ketoacidosis without coma associated with type 2 diabetes mellitus [E11.10]  Found to be in DKA in the emergency department with beta hydroxybutyrate elevated at 4.1.  Started on insulin drip.  Received normal saline in the emergency department    07/13/2019  Anion gap resolved- bicarbonate 23, insulin drip discontinued.        hypomagnesemia replaced    Hypophosphatemia- placed   05/30/2017 Unknown    Type 2 diabetes mellitus with diabetic neuropathy, with long-term current use of insulin [E11.40, Z79.4] .  Blood glucose uncontrolled continue with Levemir 37 units q.h.s. and NovoLog 12 units with meals and low-dose correction scale. started on gabapentin for neuropathic pain in  hands (7/28/19) 05/03/2016 Not Applicable       Chronic    Mixed hyperlipidemia [E78.2] continue with Lipitor 08/12/2014 Yes    Essential hypertension [I10]  amlodipine dose increased to 10 mg daily.  06/06/2013 Yes       Chronic               Disposition- SNF    DVT prophylaxis addressed with:  Brian Wick  MD  Attending Staff Physician  [facility]  pager- [phone]  [place] - [phone]

## 2019-07-30 LAB
BASOPHILS # BLD AUTO: 0.03 K/UL (ref 0–0.2)
BASOPHILS NFR BLD: 0.5 % (ref 0–1.9)
DIFFERENTIAL METHOD: ABNORMAL
EOSINOPHIL # BLD AUTO: 0.1 K/UL (ref 0–0.5)
EOSINOPHIL NFR BLD: 1.2 % (ref 0–8)
ERYTHROCYTE [DISTWIDTH] IN BLOOD BY AUTOMATED COUNT: 13.3 % (ref 11.5–14.5)
HCT VFR BLD AUTO: 33.8 % (ref 40–54)
HGB BLD-MCNC: 10.1 G/DL (ref 14–18)
IMM GRANULOCYTES # BLD AUTO: 0.01 K/UL (ref 0–0.04)
IMM GRANULOCYTES NFR BLD AUTO: 0.2 % (ref 0–0.5)
LYMPHOCYTES # BLD AUTO: 2.2 K/UL (ref 1–4.8)
LYMPHOCYTES NFR BLD: 38.9 % (ref 18–48)
MAGNESIUM SERPL-MCNC: 1.6 MG/DL (ref 1.6–2.6)
MCH RBC QN AUTO: 27.6 PG (ref 27–31)
MCHC RBC AUTO-ENTMCNC: 29.9 G/DL (ref 32–36)
MCV RBC AUTO: 92 FL (ref 82–98)
MONOCYTES # BLD AUTO: 0.4 K/UL (ref 0.3–1)
MONOCYTES NFR BLD: 7.5 % (ref 4–15)
NEUTROPHILS # BLD AUTO: 2.9 K/UL (ref 1.8–7.7)
NEUTROPHILS NFR BLD: 51.7 % (ref 38–73)
NRBC BLD-RTO: 0 /100 WBC
PHOSPHATE SERPL-MCNC: 3.1 MG/DL (ref 2.7–4.5)
PLATELET # BLD AUTO: 244 K/UL (ref 150–350)
PMV BLD AUTO: 10.6 FL (ref 9.2–12.9)
POCT GLUCOSE: 115 MG/DL (ref 70–110)
POCT GLUCOSE: 169 MG/DL (ref 70–110)
POCT GLUCOSE: 232 MG/DL (ref 70–110)
POCT GLUCOSE: 79 MG/DL (ref 70–110)
RBC # BLD AUTO: 3.66 M/UL (ref 4.6–6.2)
WBC # BLD AUTO: 5.61 K/UL (ref 3.9–12.7)

## 2019-07-30 PROCEDURE — 85025 COMPLETE CBC W/AUTO DIFF WBC: CPT

## 2019-07-30 PROCEDURE — 25000003 PHARM REV CODE 250: Performed by: HOSPITALIST

## 2019-07-30 PROCEDURE — 99232 PR SUBSEQUENT HOSPITAL CARE,LEVL II: ICD-10-PCS | Mod: ,,, | Performed by: INTERNAL MEDICINE

## 2019-07-30 PROCEDURE — 83735 ASSAY OF MAGNESIUM: CPT

## 2019-07-30 PROCEDURE — 99024 POSTOP FOLLOW-UP VISIT: CPT | Mod: ,,, | Performed by: PODIATRIST

## 2019-07-30 PROCEDURE — 99232 SBSQ HOSP IP/OBS MODERATE 35: CPT | Mod: ,,, | Performed by: INTERNAL MEDICINE

## 2019-07-30 PROCEDURE — 97110 THERAPEUTIC EXERCISES: CPT

## 2019-07-30 PROCEDURE — 20600001 HC STEP DOWN PRIVATE ROOM

## 2019-07-30 PROCEDURE — 99024 PR POST-OP FOLLOW-UP VISIT: ICD-10-PCS | Mod: ,,, | Performed by: PODIATRIST

## 2019-07-30 PROCEDURE — A4216 STERILE WATER/SALINE, 10 ML: HCPCS | Performed by: HOSPITALIST

## 2019-07-30 PROCEDURE — 63600175 PHARM REV CODE 636 W HCPCS: Performed by: PHYSICIAN ASSISTANT

## 2019-07-30 PROCEDURE — 84100 ASSAY OF PHOSPHORUS: CPT

## 2019-07-30 PROCEDURE — 63600175 PHARM REV CODE 636 W HCPCS: Performed by: HOSPITALIST

## 2019-07-30 RX ORDER — INSULIN ASPART 100 [IU]/ML
13 INJECTION, SOLUTION INTRAVENOUS; SUBCUTANEOUS
Status: DISCONTINUED | OUTPATIENT
Start: 2019-07-30 | End: 2019-08-02 | Stop reason: HOSPADM

## 2019-07-30 RX ADMIN — INSULIN ASPART 2 UNITS: 100 INJECTION, SOLUTION INTRAVENOUS; SUBCUTANEOUS at 07:07

## 2019-07-30 RX ADMIN — INSULIN ASPART 13 UNITS: 100 INJECTION, SOLUTION INTRAVENOUS; SUBCUTANEOUS at 04:07

## 2019-07-30 RX ADMIN — Medication 10 ML: at 11:07

## 2019-07-30 RX ADMIN — Medication 10 ML: at 05:07

## 2019-07-30 RX ADMIN — ATORVASTATIN CALCIUM 80 MG: 20 TABLET, FILM COATED ORAL at 09:07

## 2019-07-30 RX ADMIN — CEFAZOLIN 6 G: 1 INJECTION, POWDER, FOR SOLUTION INTRAMUSCULAR; INTRAVENOUS at 02:07

## 2019-07-30 RX ADMIN — INSULIN DETEMIR 41 UNITS: 100 INJECTION, SOLUTION SUBCUTANEOUS at 09:07

## 2019-07-30 RX ADMIN — INSULIN ASPART 12 UNITS: 100 INJECTION, SOLUTION INTRAVENOUS; SUBCUTANEOUS at 07:07

## 2019-07-30 RX ADMIN — ENOXAPARIN SODIUM 40 MG: 100 INJECTION SUBCUTANEOUS at 04:07

## 2019-07-30 RX ADMIN — GABAPENTIN 300 MG: 300 CAPSULE ORAL at 02:07

## 2019-07-30 RX ADMIN — GABAPENTIN 300 MG: 300 CAPSULE ORAL at 09:07

## 2019-07-30 NOTE — PT/OT/SLP PROGRESS
Occupational Therapy   Treatment    Name: Indio Ryder Jr.  MRN: [id number]  Admitting Diagnosis:  Bacteremia due to group B Streptococcus  13 Days Post-Op    Recommendations:     Discharge Recommendations: rehabilitation facility  Discharge Equipment Recommendations:  walker, rolling, shower chair  Barriers to discharge:  None    Assessment:     Indio Ryder Jr. is a 49 y.o. male with a medical diagnosis of Bacteremia due to group B Streptococcus.  He presents alert and willing to participate in therapy session. Upon arrival, pt found supine in bed with no family in room. Performance deficits affecting function are weakness, impaired endurance, impaired self care skills, impaired functional mobilty, impaired balance, gait instability, orthopedic precautions. Pt demo's good progress towards goals, tolerated treatment session well, and adhered to WB pxs throughout session. He would benefit from rehab following d/c to continue to progress towards goals and improve quality of life.     Rehab Prognosis:  Good; patient would benefit from acute skilled OT services to address these deficits and reach maximum level of function.       Plan:     Patient to be seen 3 x/week to address the above listed problems via therapeutic activities, self-care/home management, therapeutic exercises, neuromuscular re-education  · Plan of Care Expires: 08/22/19  · Plan of Care Reviewed with: patient    Subjective     Pain/Comfort:  · Pain Rating 1: 0/10  · Pain Rating Post-Intervention 1: 0/10    Objective:     Communicated with: RN prior to session.  Patient found supine with wound vac, PICC line upon OT entry to room.    General Precautions: Standard, fall   Orthopedic Precautions:LLE non weight bearing   Braces: N/A     Occupational Performance:     Bed Mobility:    · Patient completed Supine to Sit with modified independence   * Hob raised and using side rail    Functional Mobility/Transfers:  · Patient completed Sit <> Stand  Transfer with contact guard assistance  with  rolling walker   · Patient completed Bed <> Chair Transfer using Stand Pivot technique with stand by assistance with rolling walker  · Functional Mobility: Pt completed functional mobility within room for ADL prep requiring CGA-SBA with RW; he tolerated well with no LOB or SOB.     Activities of Daily Living:  · Upper Body Dressing: stand by assistance to arnav gown like jacket while seated EOB      [facility] 6 Click ADL: 21    Treatment & Education:  -Pt edu on OT role/POC  -Importance of OOB activity with staff assistance  -Safety during functional t/f and mobility  -White board updated  - All questions/concerns answered within OT scope of practice    Exercises:   x10 dips while standing using RW for support  x10 rotating crunches ( brining elbow to opposite knee)  Pt completed BUE/BLE AROM exercises while seated [facility] including: 1x10 reps in shoulder flexion, elbow flexion/extension, chest press, and alternating punches; Hip flexion, knee flexion/extension  Pt tolerated well with min rest breaks between each exercise.     Patient left up in chair with all lines intact, call button in reach and RN notifiedEducation:      GOALS:   Multidisciplinary Problems     Occupational Therapy Goals        Problem: Occupational Therapy Goal    Goal Priority Disciplines Outcome Interventions   Occupational Therapy Goal     OT, PT/OT Ongoing (interventions implemented as appropriate)    Description:  Goals to be met by: 8/3/19    Patient will increase functional independence with ADLs by performing:    UE Dressing with Supervision.  LE Dressing with Stand-by Assistance.  Grooming while standing at sink with Stand-by Assistance.  Toileting from toilet with Stand-by Assistance for hygiene and clothing management.   Supine to sit with [place]. MET 7/26  Toilet transfer to toilet with Stand-by Assistance.                       Time Tracking:     OT Date of Treatment: 07/30/19  OT Start  Time: 1433  OT Stop Time: 1500  OT Total Time (min): 27 min    Billable Minutes:Therapeutic Exercise 27    Mica Ray OT  7/30/2019

## 2019-07-30 NOTE — PROGRESS NOTES
"[facility]  Endocrinology  Progress Note    Admit Date: 2019     Reason for Consult: Management of T2DM, Hyperglycemia, DKA    Surgical Procedure and Date: 19    Diabetes diagnosis year: >20 years, 1990s?    Home Diabetes Medications:  Metformin 500mg BID, Lantus 26U QHS    How often checking glucose at home? None  BG readings on regimen: n/a  Hypoglycemia on the regimen?  No  Missed doses on regimen?  No    Diabetes Complications include:     Hyperglycemia and Foot ulcer      Complicating diabetes co morbidities:  HTN      HPI:   Patient is a 49 y.o. male with a diagnosis of Type 2 DM (noncomplaince, skip Levemir 1-2x a week), HTN, PVD, hx of right foot osteomyelitis who was admitted to hospital medicine for DKA and L foot ulcer. He report  4-5 days of nausea and vomiting. Vomiting is non-bloody. He has not been able to keep anything down. Pt hasn't been administering his insulin during this time due to the fact that he has not been able to keep anything down. He was found to have a large open wound on L foot, pt recently lost his insurance and has been unable to follow up with wound care. Xray showed new Osteomyelitis of L foot.  Pt reported skipping his Lantus at home 1-2x a week, not on insulins with meal. Was on Trulicity but stopped due to lost of insurance. BHB was 4.1, A1C >14, Anion gap of 21, glucose 349. Endocrine was consulted for DKA, diabetes management.     Interval HPI:   Overnight events:  VS stable  BG levels slightly elevated    Eatin%  Nausea: No  Hypoglycemia and intervention: No  Fever: No  TPN and/or TF: No    /66 (BP Location: Left arm, Patient Position: Lying)   Pulse 107   Temp 98.6 °F (37 °C) (Oral)   Resp 16   Ht 6' 1" (1.854 m)   Wt 88.3 kg (194 lb 10.7 oz)   SpO2 97%   BMI 25.68 kg/m²      Labs Reviewed and Include    No results for input(s): GLU, CALCIUM, ALBUMIN, PROT, NA, K, CO2, CL, BUN, CREATININE, ALKPHOS, ALT, AST, BILITOT in the " last 24 hours.  Lab Results   Component Value Date    WBC 5.61 07/30/2019    HGB 10.1 (L) 07/30/2019    HCT 33.8 (L) 07/30/2019    MCV 92 07/30/2019     07/30/2019     No results for input(s): TSH, FREET4 in the last 168 hours.  Lab Results   Component Value Date    HGBA1C >14.0 (H) 07/12/2019       Nutritional status:   Body mass index is 25.68 kg/m².  Lab Results   Component Value Date    ALBUMIN 2.6 (L) 07/29/2019    ALBUMIN 2.5 (L) 07/28/2019    ALBUMIN 2.5 (L) 07/27/2019     No results found for: PREALBUMIN    Estimated Creatinine Clearance: 89.8 mL/min (based on SCr of 1.1 mg/dL).    Accu-Checks  Recent Labs     07/28/19  0114 07/28/19  0722 07/28/19  1132 07/28/19  1647 07/28/19  2108 07/29/19  0757 07/29/19  1140 07/29/19  1618 07/29/19  2045 07/30/19  0734   POCTGLUCOSE 141* 232* 142* 225* 168* 222* 175* 124* 221* 232*       Current Medications and/or Treatments Impacting Glycemic Control  Immunotherapy:    Immunosuppressants     None        Steroids:   Hormones (From admission, onward)    None        Pressors:    Autonomic Drugs (From admission, onward)    None        Hyperglycemia/Diabetes Medications:   Antihyperglycemics (From admission, onward)    Start     Stop Route Frequency Ordered    07/29/19 2100  insulin detemir U-100 pen 37 Units      -- SubQ Nightly 07/29/19 1050    07/29/19 1130  insulin aspart U-100 pen 12 Units      -- SubQ 3 times daily with meals 07/29/19 1050    07/26/19 1509  insulin aspart U-100 pen 3 Units      -- SubQ As needed (PRN) 07/26/19 1410    07/20/19 2250  insulin aspart U-100 pen 0-5 Units      -- SubQ Before meals & nightly PRN 07/20/19 2152    07/20/19 1145  insulin regular (Humulin R) 100 Units in sodium chloride 0.9% 100 mL infusion      07/20 2300 IV Continuous 07/20/19 1035          ASSESSMENT and PLAN    Acute osteomyelitis  Management per primary and Podiatry  On IV cefazolin  Glucose control as above      Diabetic ketoacidosis without coma associated with  type 2 diabetes mellitus  - DKA on admission due to noncompliance of insulin use combined with osteomyelitis of foot  - Now resolved      Type 2 diabetes mellitus with hyperglycemia, with long-term current use of insulin  DKA is now resolved.     Goal BG while inpatient: 140-180 mg/dl    Pt's current insulin requirements might be higher due to infection.    Plan:   - Increase Detemir to 41 units qHS   - Increase Novolog to 13 units with meals   - Continue low dose correction   - Accuchecks qAC and HS     Discharge Recommendations: TBD    Essential hypertension  - manage per primary          Roselyn Chow MD  Endocrinology  [facility]

## 2019-07-30 NOTE — PLAN OF CARE
Problem: Occupational Therapy Goal  Goal: Occupational Therapy Goal  Goals to be met by: 8/3/19    Patient will increase functional independence with ADLs by performing:    UE Dressing with Supervision.  LE Dressing with Stand-by Assistance.  Grooming while standing at sink with Stand-by Assistance.  Toileting from toilet with Stand-by Assistance for hygiene and clothing management.   Supine to sit with [place]. MET 7/26  Toilet transfer to toilet with Stand-by Assistance.      Outcome: Ongoing (interventions implemented as appropriate)  Pt progressing well with goals. Continue with POC.   Mica Ray, OT  7/30/2019

## 2019-07-30 NOTE — SUBJECTIVE & OBJECTIVE
"Interval HPI:   Overnight events:  VS stable  BG levels slightly elevated    Eatin%  Nausea: No  Hypoglycemia and intervention: No  Fever: No  TPN and/or TF: No    /66 (BP Location: Left arm, Patient Position: Lying)   Pulse 107   Temp 98.6 °F (37 °C) (Oral)   Resp 16   Ht 6' 1" (1.854 m)   Wt 88.3 kg (194 lb 10.7 oz)   SpO2 97%   BMI 25.68 kg/m²     Labs Reviewed and Include    No results for input(s): GLU, CALCIUM, ALBUMIN, PROT, NA, K, CO2, CL, BUN, CREATININE, ALKPHOS, ALT, AST, BILITOT in the last 24 hours.  Lab Results   Component Value Date    WBC 5.61 2019    HGB 10.1 (L) 2019    HCT 33.8 (L) 2019    MCV 92 2019     2019     No results for input(s): TSH, FREET4 in the last 168 hours.  Lab Results   Component Value Date    HGBA1C >14.0 (H) 2019       Nutritional status:   Body mass index is 25.68 kg/m².  Lab Results   Component Value Date    ALBUMIN 2.6 (L) 2019    ALBUMIN 2.5 (L) 2019    ALBUMIN 2.5 (L) 2019     No results found for: PREALBUMIN    Estimated Creatinine Clearance: 89.8 mL/min (based on SCr of 1.1 mg/dL).    Accu-Checks  Recent Labs     19  0114 19  0722 19  1132 19  1647 19  2108 19  0757 19  1140 19  1618 19  2045 19  0734   POCTGLUCOSE 141* 232* 142* 225* 168* 222* 175* 124* 221* 232*       Current Medications and/or Treatments Impacting Glycemic Control  Immunotherapy:    Immunosuppressants     None        Steroids:   Hormones (From admission, onward)    None        Pressors:    Autonomic Drugs (From admission, onward)    None        Hyperglycemia/Diabetes Medications:   Antihyperglycemics (From admission, onward)    Start     Stop Route Frequency Ordered    19 2100  insulin detemir U-100 pen 37 Units      -- SubQ Nightly 19 1050    19 1130  insulin aspart U-100 pen 12 Units      -- SubQ 3 times daily with meals 19 1050    " 07/26/19 1509  insulin aspart U-100 pen 3 Units      -- SubQ As needed (PRN) 07/26/19 1410    07/20/19 2250  insulin aspart U-100 pen 0-5 Units      -- SubQ Before meals & nightly PRN 07/20/19 2152 07/20/19 1145  insulin regular (Humulin R) 100 Units in sodium chloride 0.9% 100 mL infusion      07/20 2300 IV Continuous 07/20/19 1035

## 2019-07-30 NOTE — PLAN OF CARE
Problem: Adult Inpatient Plan of Care  Goal: Plan of Care Review  Outcome: Ongoing (interventions implemented as appropriate)  Plan of care reviewed with patient. No acute changes overnight. Safety maintained, call light in reach, bed in lowest position, will continue to monitor.

## 2019-07-30 NOTE — ASSESSMENT & PLAN NOTE
DKA is now resolved.     Goal BG while inpatient: 140-180 mg/dl    Pt's current insulin requirements might be higher due to infection.    Plan:   - Increase Detemir to 41 units qHS   - Increase Novolog to 13 units with meals   - Continue low dose correction   - Accuchecks qAC and HS     Discharge Recommendations: TBD

## 2019-07-30 NOTE — ASSESSMENT & PLAN NOTE
- DKA on admission due to noncompliance of insulin use combined with osteomyelitis of foot  - Now resolved

## 2019-07-30 NOTE — PLAN OF CARE
Problem: Adult Inpatient Plan of Care  Goal: Plan of Care Review  Outcome: Ongoing (interventions implemented as appropriate)     07/29/19 1939   Plan of Care Review   Plan of Care Reviewed With patient     Pt remains free from falls/injury. No acute events during shift. VSS, NAD. Bed in lowest position, wheels locked, side rails up times 2, call light w/in reach, bed alarm on. Room clear of obstacles. Pt BG monitored AC/HS as per order. Pt covered with insulin aspart per low sliding scale and scheduled insulin aspart. No S&S of hypo/hyperglycemia noticed. Pt remains afibrile during shift. No other S&S of infection noticed. IV antibiotics administered as ordered. WCTM.

## 2019-07-30 NOTE — PLAN OF CARE
Problem: Adult Inpatient Plan of Care  Goal: Plan of Care Review  Outcome: Ongoing (interventions implemented as appropriate)     07/30/19 7990   Plan of Care Review   Plan of Care Reviewed With patient     Pt remains free from falls/injury. No acute events during shift. VSS, NAD. Bed in lowest position, wheels locked, side rails up times 2, call light w/in reach, bed alarm on. Room clear of obstacles. Pt BG monitored AC/HS as per order. Pt covered with insulin aspart per low sliding scale and scheduled insulin aspart. No S&S of hypo/hyperglycemia noticed. Pt remains afibrile during shift. No other S&S of infection noticed. IV antibiotics administered as ordered. WCTM.

## 2019-07-30 NOTE — NURSING
Dr. Rust notified of pt's BG=79. Pt has scheduled insulin Aspart 13 units ordered. Dr. Rust orders to hold meal time insulin at this time. [facility].

## 2019-07-30 NOTE — PLAN OF CARE
to the bedside at this time to see the patient.  The patient is aware the CM team continues to follow throughout this admission for discharge needs and/or recommendations.   name and number remains on the board at the bedside for the patient or the famil     07/30/19 3975   Discharge Reassessment   Assessment Type Discharge Planning Reassessment   Provided patient/caregiver education on the expected discharge date and the discharge plan Yes   Do you have any problems affording any of your prescribed medications? No   Discharge Plan A Skilled Nursing Facility   Discharge Plan B Skilled Nursing Facility   DME Needed Upon Discharge    (TBD)   Patient choice form signed by patient/caregiver Yes   Anticipated Discharge Disposition SNF   Can the patient answer the patient profile reliably? Yes, cognitively intact   How does the patient rate their overall health at the present time? Fair   y to call with discharge needs or questions.  Understanding verbalized.

## 2019-07-31 LAB
BASOPHILS # BLD AUTO: 0.02 K/UL (ref 0–0.2)
BASOPHILS NFR BLD: 0.4 % (ref 0–1.9)
DIFFERENTIAL METHOD: ABNORMAL
EOSINOPHIL # BLD AUTO: 0 K/UL (ref 0–0.5)
EOSINOPHIL NFR BLD: 0.7 % (ref 0–8)
ERYTHROCYTE [DISTWIDTH] IN BLOOD BY AUTOMATED COUNT: 13.4 % (ref 11.5–14.5)
HCT VFR BLD AUTO: 31.3 % (ref 40–54)
HGB BLD-MCNC: 9.5 G/DL (ref 14–18)
IMM GRANULOCYTES # BLD AUTO: 0.01 K/UL (ref 0–0.04)
IMM GRANULOCYTES NFR BLD AUTO: 0.2 % (ref 0–0.5)
LYMPHOCYTES # BLD AUTO: 2.2 K/UL (ref 1–4.8)
LYMPHOCYTES NFR BLD: 40.3 % (ref 18–48)
MAGNESIUM SERPL-MCNC: 1.6 MG/DL (ref 1.6–2.6)
MCH RBC QN AUTO: 28.2 PG (ref 27–31)
MCHC RBC AUTO-ENTMCNC: 30.4 G/DL (ref 32–36)
MCV RBC AUTO: 93 FL (ref 82–98)
MONOCYTES # BLD AUTO: 0.4 K/UL (ref 0.3–1)
MONOCYTES NFR BLD: 6.9 % (ref 4–15)
NEUTROPHILS # BLD AUTO: 2.8 K/UL (ref 1.8–7.7)
NEUTROPHILS NFR BLD: 51.5 % (ref 38–73)
NRBC BLD-RTO: 0 /100 WBC
PHOSPHATE SERPL-MCNC: 3.2 MG/DL (ref 2.7–4.5)
PLATELET # BLD AUTO: 221 K/UL (ref 150–350)
PMV BLD AUTO: 10.9 FL (ref 9.2–12.9)
POCT GLUCOSE: 107 MG/DL (ref 70–110)
POCT GLUCOSE: 120 MG/DL (ref 70–110)
POCT GLUCOSE: 160 MG/DL (ref 70–110)
POCT GLUCOSE: 173 MG/DL (ref 70–110)
RBC # BLD AUTO: 3.37 M/UL (ref 4.6–6.2)
WBC # BLD AUTO: 5.38 K/UL (ref 3.9–12.7)

## 2019-07-31 PROCEDURE — 99024 PR POST-OP FOLLOW-UP VISIT: ICD-10-PCS | Mod: ,,, | Performed by: PODIATRIST

## 2019-07-31 PROCEDURE — 63600175 PHARM REV CODE 636 W HCPCS: Performed by: PHYSICIAN ASSISTANT

## 2019-07-31 PROCEDURE — 97530 THERAPEUTIC ACTIVITIES: CPT

## 2019-07-31 PROCEDURE — 99232 SBSQ HOSP IP/OBS MODERATE 35: CPT | Mod: ,,, | Performed by: INTERNAL MEDICINE

## 2019-07-31 PROCEDURE — 63600175 PHARM REV CODE 636 W HCPCS: Performed by: STUDENT IN AN ORGANIZED HEALTH CARE EDUCATION/TRAINING PROGRAM

## 2019-07-31 PROCEDURE — A4216 STERILE WATER/SALINE, 10 ML: HCPCS | Performed by: HOSPITALIST

## 2019-07-31 PROCEDURE — 20600001 HC STEP DOWN PRIVATE ROOM

## 2019-07-31 PROCEDURE — 99024 POSTOP FOLLOW-UP VISIT: CPT | Mod: ,,, | Performed by: PODIATRIST

## 2019-07-31 PROCEDURE — 25000003 PHARM REV CODE 250: Performed by: HOSPITALIST

## 2019-07-31 PROCEDURE — 84100 ASSAY OF PHOSPHORUS: CPT

## 2019-07-31 PROCEDURE — 99232 PR SUBSEQUENT HOSPITAL CARE,LEVL II: ICD-10-PCS | Mod: ,,, | Performed by: INTERNAL MEDICINE

## 2019-07-31 PROCEDURE — S5571 INSULIN DISPOS PEN 3 ML: HCPCS | Performed by: STUDENT IN AN ORGANIZED HEALTH CARE EDUCATION/TRAINING PROGRAM

## 2019-07-31 PROCEDURE — 83735 ASSAY OF MAGNESIUM: CPT

## 2019-07-31 PROCEDURE — 85025 COMPLETE CBC W/AUTO DIFF WBC: CPT

## 2019-07-31 PROCEDURE — 63600175 PHARM REV CODE 636 W HCPCS: Performed by: HOSPITALIST

## 2019-07-31 PROCEDURE — 97116 GAIT TRAINING THERAPY: CPT

## 2019-07-31 RX ADMIN — ENOXAPARIN SODIUM 40 MG: 100 INJECTION SUBCUTANEOUS at 04:07

## 2019-07-31 RX ADMIN — GABAPENTIN 300 MG: 300 CAPSULE ORAL at 09:07

## 2019-07-31 RX ADMIN — INSULIN ASPART 13 UNITS: 100 INJECTION, SOLUTION INTRAVENOUS; SUBCUTANEOUS at 09:07

## 2019-07-31 RX ADMIN — INSULIN ASPART 13 UNITS: 100 INJECTION, SOLUTION INTRAVENOUS; SUBCUTANEOUS at 04:07

## 2019-07-31 RX ADMIN — INSULIN ASPART 13 UNITS: 100 INJECTION, SOLUTION INTRAVENOUS; SUBCUTANEOUS at 12:07

## 2019-07-31 RX ADMIN — CEFAZOLIN 6 G: 1 INJECTION, POWDER, FOR SOLUTION INTRAMUSCULAR; INTRAVENOUS at 02:07

## 2019-07-31 RX ADMIN — GABAPENTIN 300 MG: 300 CAPSULE ORAL at 02:07

## 2019-07-31 RX ADMIN — ATORVASTATIN CALCIUM 80 MG: 20 TABLET, FILM COATED ORAL at 09:07

## 2019-07-31 RX ADMIN — Medication 10 ML: at 12:07

## 2019-07-31 RX ADMIN — Medication 10 ML: at 06:07

## 2019-07-31 RX ADMIN — ACETAMINOPHEN 650 MG: 325 TABLET ORAL at 02:07

## 2019-07-31 RX ADMIN — INSULIN DETEMIR 41 UNITS: 100 INJECTION, SOLUTION SUBCUTANEOUS at 09:07

## 2019-07-31 NOTE — ASSESSMENT & PLAN NOTE
Indio Ryder Jr. is a 49 y.o. male  S/P Debridement left foot wound (DOS: 7/14 per Dr. 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 Almonte).    - Wound vac changed today. Periwound appears viable but macerated.  Painted periwound with gentian violent and will continue wound vac at 125 mmHg continuous pressure while inpatient. Podiatry will change wound vac while inpatient.   - Per ID: Cefazolin 6 gram IV continuous infusion q 24 hours for 6 weeks from date of surgery as unclear if residual osteomyelitis is present without clean margin culture and pathology results (end date 8/28/19).   - Partial weight bearing to heel only left foot in post op shoe  -Continue PT for gait training.    DC Instructions:  Patient is to follow up with podiatry within 10 days of discharge.  Call [phone]  to make an appointment.  Home health/facility to do wound vac dressing changes every  as follows:  Rinse with saline, pat dry, place adaptic over graft, apply wound vac, place vac on 125mmHg slow continuous.  Dress in cast padding and coban

## 2019-07-31 NOTE — PLAN OF CARE
Chart reviewed. Case discussed with primary team. Does not need to be seen by ID per primary team.    49 year old male with uncontrolled DM and HTN admitted and Group B strep bacteremia.       Initial wound cultures grew GBS and MSSA, finegoldia  Per podiatry note, there was necrosis and purulence tracking down to bone and patient underwent I&D with podiatry 7/14; left 5th met head noted to be spongy. Cultures from met head with Group B strep and peptoniphilus.        He underwent repeat I&D and left partial 5th ray amputation 7/17.   Per OP note, purulent drainage was expressed proximal to distal from the level of the plantar aspect of the left calcaneus to the open wound on the plantar midfoot. Purulent drainage was also manually expressed from the plantar distal aspect of the forefoot. Cultures of this returned with skin janes. A small portion of the distal metatarsal was reported as sent as a clean margin to Micro and pathology.  Unfortunately micro never received the specimen for cultures. Pathology negative. ID consulted for antibiotic recommendations.        Plan:  - given the extend of his infection and graft in place, recommend to continue Cefazolin 6 gram IV continuous infusion q 24 hours for 6 weeks from date of surgery (end date 8/28/19).   - Patient will need ESR, CRP, CBC and CMP to be drawn weekly with results faxed to the ID Department at  [phone]  - Continue wound care per podiatry.  - Prior to discharge, please ensure the patient's follow-up has been scheduled.  If there is still no follow-up scheduled in Infectious Diseases clinic, please send an EPIC message to the Infectious Diseases charge nurse Rhoda Martinez.

## 2019-07-31 NOTE — PROGRESS NOTES
[facility]  Podiatry  Progress Note    Patient Name: Indio Ryder Jr.  MRN: [id number]  Admission Date: 7/12/2019  Hospital Length of Stay: 19 days  Attending Physician: Luis Rust MD  Primary Care Provider: Lorena Thakur MD     Subjective:     Interval History:   Patient seen at bedside s/p left foot partial 5th ray amputation with irrigation and debridement and application of graft (DOS: 7/17/2019). No acute distress. Denies any pedal pain. Denies calf pain. No new complaints. Dressings to left foot clean, dry, intact. Wound vac to left foot wound intact running @ 125 mmHg continuous pressure. Admits to nausea since ED admission, no vomiting, fever, chills.     7/20 Patient Seen at bedside for dressing change.  Wound vac intact.  Patient denies F/C/N/V.    7/22 Patient Seen at bedside for dressing change.  Wound vac intact.  Patient denies F/C/N/V.    7/24 Patient seen at bedside for dressing change. Wound vac to left foot intact running at 125 mmHg continuous pressure. Patient denies nausea, vomiting, fever, chills. Denies any pain. No acute distress. No new complaints    7/26: Patient Seen at bedside for dressing change.  Wound vac intact.  Patient denies F/C/N/V.    7/29: Patient Seen at bedside for dressing change.  Wound vac intact.  Denies any pain. No acute distress. Patient denies F/C/N/V.    7/31: Patient seen at bedside for dressing change. Wound vac intact. Denies N/V/F/C.    Follow-up For: Procedure(s) (LRB):  DEBRIDEMENT, FOOT with leftt 5th ray partial amputation with Neox Graft (Left)    Post-Operative Day: 14 Days Post-Op    Scheduled Meds:   amLODIPine  10 mg Oral Daily    atorvastatin  80 mg Oral Daily    ceFAZolin(ANCEF) in D5W 500 mL CONTINUOUS INFUSION  6 g Intravenous Q24H    enoxaparin  40 mg Subcutaneous Daily    gabapentin  300 mg Oral TID    insulin aspart U-100  13 Units Subcutaneous TIDWM    insulin detemir U-100  41 Units Subcutaneous QHS    sodium  chloride 0.9%  10 mL Intravenous Q6H     Continuous Infusions:    PRN Meds:acetaminophen, calcium carbonate, Dextrose 10% Bolus, Dextrose 10% Bolus, glucagon (human recombinant), glucose, glucose, hydrALAZINE, insulin aspart U-100, insulin aspart U-100, ondansetron, promethazine (PHENERGAN) IVPB, sodium chloride 0.9%, sodium chloride 0.9%, Flushing PICC Protocol **AND** sodium chloride 0.9% **AND** sodium chloride 0.9%    Review of Systems   Constitutional: Negative for appetite change, chills and fever.   Eyes: Negative for visual disturbance.   Respiratory: Negative for cough and shortness of breath.    Cardiovascular: Negative for chest pain and leg swelling.   Gastrointestinal: Negative for abdominal pain, constipation, diarrhea, nausea and vomiting.   Genitourinary: Negative for dysuria, frequency and hematuria.   Musculoskeletal: Negative for back pain and myalgias.   Skin: Positive for wound (left foot surgical wound; right foot plantar ulcer). Negative for color change and rash.   Neurological: Negative for dizziness, light-headedness and headaches.   Psychiatric/Behavioral: Negative for agitation and behavioral problems. The patient is not nervous/anxious.      Objective:     Vital Signs (Most Recent):  Temp: 97.7 °F (36.5 °C) (07/31/19 1126)  Pulse: 103 (07/31/19 1535)  Resp: 17 (07/31/19 1126)  BP: 119/71 (07/31/19 1126)  SpO2: 98 % (07/31/19 1126) Vital Signs (24h Range):  Temp:  [97.7 °F (36.5 °C)-98.3 °F (36.8 °C)] 97.7 °F (36.5 °C)  Pulse:  [] 103  Resp:  [16-18] 17  SpO2:  [95 %-98 %] 98 %  BP: (105-127)/(69-77) 119/71     Weight: 88.6 kg (195 lb 5.2 oz)  Body mass index is 25.77 kg/m².    Foot Exam    General  Orientation: alert and oriented to person, place, and time       Right Foot/Ankle     Inspection and Palpation  Tenderness: none   Swelling: none   Skin Exam: ulcer;     Neurovascular  Dorsalis pedis: 1+  Posterior tibial: 1+  Saphenous nerve sensation: diminished  Tibial nerve  sensation: diminished  Superficial peroneal nerve sensation: diminished  Deep peroneal nerve sensation: diminished  Sural nerve sensation: diminished      Left Foot/Ankle      Inspection and Palpation  Ecchymosis: none  Tenderness: none   Swelling: none   Skin Exam: drainage, skin changes and ulcer; (surgical wound 2/2 partial 5th ray amp with irrigation and debridement; proximal plantar sutures remain intact; serosanguinous drainage noted in wound vac cannister; no edema, no erythema, no signs of infection noted)    Neurovascular  Dorsalis pedis: 1+  Posterior tibial: 1+  Saphenous nerve sensation: diminished  Tibial nerve sensation: diminished  Superficial peroneal nerve sensation: diminished  Deep peroneal nerve sensation: diminished  Sural nerve sensation: diminished            Laboratory:  A1C:   Recent Labs   Lab 07/12/19  1023   HGBA1C >14.0*     CBC:   Recent Labs   Lab 07/31/19  0513   WBC 5.38   RBC 3.37*   HGB 9.5*   HCT 31.3*      MCV 93   MCH 28.2   MCHC 30.4*     CRP:   No results for input(s): CRP in the last 168 hours.  ESR:   No results for input(s): SEDRATE in the last 168 hours.  Wound Cultures:   Recent Labs   Lab 07/12/19  1257 07/14/19  1728 07/17/19  1214   LABAERO STREPTOCOCCUS AGALACTIAE (GROUP B)  Few  Beta-hemolytic streptococci are routinely susceptible to   penicillins,cephalosporins and carbapenems.  Susceptibility testing not routinely performed  *  STAPHYLOCOCCUS AUREUS  Few  * STREPTOCOCCUS AGALACTIAE (GROUP B)  Rare  Beta-hemolytic streptococci are routinely susceptible to   penicillins,cephalosporins and carbapenems.  Susceptibility testing not routinely performed  Skin janes also present  * Skin janes,  no predominant organism     Microbiology Results (last 7 days)     Procedure Component Value Units Date/Time    Culture, Anaerobe [[id number]] Collected:  07/17/19 1214    Order Status:  Completed Specimen:  Wound from Foot, Left Updated:  07/26/19 1316     Anaerobic Culture  No anaerobes isolated    Narrative:       Left foot wound        Specimen (12h ago, onward)    None          Diagnostic Results:  I have reviewed all pertinent imaging results/findings within the past 24 hours.    Clinical Findings:  Left foot with wound vac application intact; Running at 125 mmHg continuous pressure. Proximal sutures intact.    Ulcer Location: left lateral plantar foot  Measurements: 8 cm x 4 cm x 1 cm  Periwound: viable/ maceration  Drainage: serosanguinous.  Base:  80% granular. 20% fibrin., wound graft intact  Signs of infection: None. No erythema, no edema, negative probe to bone     7/29              7/26          7/24          7/22              7/20            7/18      7/16      7/15 s/p I&D      7/12 post ED bedside debridement      7/12 pre ED bedside debridement          Assessment/Plan:     Impaired gait and mobility  Continue gait training with PT  Partial weightbearing to heel only in surgical shoe left foot     Foot ulcer  Mr. Ryder is a 49 y.o M who presented to ED with bilateral foot ulcers and constitutional symptoms of nausea and vomiting. Right plantar foot ulcer remains stable. Continue betadine wet to dry dressings right foot. Left foot ulcer was infected upon evaluation in the ED. Pt s/p I&D (DOS:7/14), s/p partial 5th ray amputation with allograft application and wound vac application (DOS: 7/17/19)  Podiatry will continue to monitor.    Anemia  Managed per hospital medicine    Leukocytosis  resolved    Acute osteomyelitis  Indio Ryder Jr. is a 49 y.o. male  S/P Debridement left foot wound (DOS: 7/14 per Dr. 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 Almonte).    - Wound vac changed today. Periwound appears viable but macerated.  Painted periwound with gentian violent and will continue wound vac at 125 mmHg continuous pressure while inpatient. Podiatry will change wound vac  while inpatient.   - Per ID: Cefazolin 6 gram IV continuous infusion q 24 hours for 6 weeks from date of surgery as unclear if residual osteomyelitis is present without clean margin culture and pathology results (end date 8/28/19).   - Partial weight bearing to heel only left foot in post op shoe  -Continue PT for gait training.    DC Instructions:  Patient is to follow up with podiatry within 10 days of discharge.  Call [phone]  to make an appointment.  Home health/facility to do wound vac dressing changes every  as follows:  Rinse with saline, pat dry, place adaptic over graft, apply wound vac, place vac on 125mmHg slow continuous.  Dress in cast padding and coban          Sepsis  resolved    Type 2 diabetes mellitus with left eye affected by mild nonproliferative retinopathy without macular edema, without long-term current use of insulin  Managed per hospital medicine    Diabetic ketoacidosis without coma associated with type 2 diabetes mellitus  resolved    Type 2 diabetes mellitus with hyperglycemia, with long-term current use of insulin  Managed by hospital medicine/endo team    Mixed hyperlipidemia  Managed per hospital medicine    Essential hypertension  Per primary        Romy Tricia Hi MD  Podiatry  [facility]

## 2019-07-31 NOTE — SUBJECTIVE & OBJECTIVE
"Interval HPI:   Overnight events:  VS stable    Eatin%  Nausea: No  Hypoglycemia and intervention: No  Fever: No  TPN and/or TF: No    /72 (BP Location: Left arm, Patient Position: Lying)   Pulse 99   Temp 97.8 °F (36.6 °C) (Oral)   Resp 16   Ht 6' 1" (1.854 m)   Wt 88.6 kg (195 lb 5.2 oz)   SpO2 96%   BMI 25.77 kg/m²     Labs Reviewed and Include    No results for input(s): GLU, CALCIUM, ALBUMIN, PROT, NA, K, CO2, CL, BUN, CREATININE, ALKPHOS, ALT, AST, BILITOT in the last 24 hours.  Lab Results   Component Value Date    WBC 5.38 2019    HGB 9.5 (L) 2019    HCT 31.3 (L) 2019    MCV 93 2019     2019     No results for input(s): TSH, FREET4 in the last 168 hours.  Lab Results   Component Value Date    HGBA1C >14.0 (H) 2019       Nutritional status:   Body mass index is 25.77 kg/m².  Lab Results   Component Value Date    ALBUMIN 2.6 (L) 2019    ALBUMIN 2.5 (L) 2019    ALBUMIN 2.5 (L) 2019     No results found for: PREALBUMIN    Estimated Creatinine Clearance: 89.8 mL/min (based on SCr of 1.1 mg/dL).    Accu-Checks  Recent Labs     19  2108 19  0757 19  1140 19  1618 19  2045 19  0734 19  1128 19  1634 19  2136 19  0750   POCTGLUCOSE 168* 222* 175* 124* 221* 232* 79 169* 115* 160*       Current Medications and/or Treatments Impacting Glycemic Control  Immunotherapy:    Immunosuppressants     None        Steroids:   Hormones (From admission, onward)    None        Pressors:    Autonomic Drugs (From admission, onward)    None        Hyperglycemia/Diabetes Medications:   Antihyperglycemics (From admission, onward)    Start     Stop Route Frequency Ordered    19 2100  insulin detemir U-100 pen 41 Units      -- SubQ Nightly 19 1000    19 1130  insulin aspart U-100 pen 13 Units      -- SubQ 3 times daily with meals 19 1000    19 1509  insulin aspart U-100 " pen 3 Units      -- SubQ As needed (PRN) 07/26/19 1410    07/20/19 2250  insulin aspart U-100 pen 0-5 Units      -- SubQ Before meals & nightly PRN 07/20/19 2152 07/20/19 1145  insulin regular (Humulin R) 100 Units in sodium chloride 0.9% 100 mL infusion      07/20 2300 IV Continuous 07/20/19 1036

## 2019-07-31 NOTE — SUBJECTIVE & OBJECTIVE
Subjective:     Interval History:   Patient seen at bedside s/p left foot partial 5th ray amputation with irrigation and debridement and application of graft (DOS: 7/17/2019). No acute distress. Denies any pedal pain. Denies calf pain. No new complaints. Dressings to left foot clean, dry, intact. Wound vac to left foot wound intact running @ 125 mmHg continuous pressure. Admits to nausea since ED admission, no vomiting, fever, chills.     7/20 Patient Seen at bedside for dressing change.  Wound vac intact.  Patient denies F/C/N/V.    7/22 Patient Seen at bedside for dressing change.  Wound vac intact.  Patient denies F/C/N/V.    7/24 Patient seen at bedside for dressing change. Wound vac to left foot intact running at 125 mmHg continuous pressure. Patient denies nausea, vomiting, fever, chills. Denies any pain. No acute distress. No new complaints    7/26: Patient Seen at bedside for dressing change.  Wound vac intact.  Patient denies F/C/N/V.    7/29: Patient Seen at bedside for dressing change.  Wound vac intact.  Denies any pain. No acute distress. Patient denies F/C/N/V.    7/31: Patient seen at bedside for dressing change. Wound vac intact. Denies N/V/F/C.    Follow-up For: Procedure(s) (LRB):  DEBRIDEMENT, FOOT with leftt 5th ray partial amputation with Neox Graft (Left)    Post-Operative Day: 14 Days Post-Op    Scheduled Meds:   amLODIPine  10 mg Oral Daily    atorvastatin  80 mg Oral Daily    ceFAZolin(ANCEF) in D5W 500 mL CONTINUOUS INFUSION  6 g Intravenous Q24H    enoxaparin  40 mg Subcutaneous Daily    gabapentin  300 mg Oral TID    insulin aspart U-100  13 Units Subcutaneous TIDWM    insulin detemir U-100  41 Units Subcutaneous QHS    sodium chloride 0.9%  10 mL Intravenous Q6H     Continuous Infusions:    PRN Meds:acetaminophen, calcium carbonate, Dextrose 10% Bolus, Dextrose 10% Bolus, glucagon (human recombinant), glucose, glucose, hydrALAZINE, insulin aspart U-100, insulin aspart U-100,  ondansetron, promethazine (PHENERGAN) IVPB, sodium chloride 0.9%, sodium chloride 0.9%, Flushing PICC Protocol **AND** sodium chloride 0.9% **AND** sodium chloride 0.9%    Review of Systems   Constitutional: Negative for appetite change, chills and fever.   Eyes: Negative for visual disturbance.   Respiratory: Negative for cough and shortness of breath.    Cardiovascular: Negative for chest pain and leg swelling.   Gastrointestinal: Negative for abdominal pain, constipation, diarrhea, nausea and vomiting.   Genitourinary: Negative for dysuria, frequency and hematuria.   Musculoskeletal: Negative for back pain and myalgias.   Skin: Positive for wound (left foot surgical wound; right foot plantar ulcer). Negative for color change and rash.   Neurological: Negative for dizziness, light-headedness and headaches.   Psychiatric/Behavioral: Negative for agitation and behavioral problems. The patient is not nervous/anxious.      Objective:     Vital Signs (Most Recent):  Temp: 97.7 °F (36.5 °C) (07/31/19 1126)  Pulse: 103 (07/31/19 1535)  Resp: 17 (07/31/19 1126)  BP: 119/71 (07/31/19 1126)  SpO2: 98 % (07/31/19 1126) Vital Signs (24h Range):  Temp:  [97.7 °F (36.5 °C)-98.3 °F (36.8 °C)] 97.7 °F (36.5 °C)  Pulse:  [] 103  Resp:  [16-18] 17  SpO2:  [95 %-98 %] 98 %  BP: (105-127)/(69-77) 119/71     Weight: 88.6 kg (195 lb 5.2 oz)  Body mass index is 25.77 kg/m².    Foot Exam    General  Orientation: alert and oriented to person, place, and time       Right Foot/Ankle     Inspection and Palpation  Tenderness: none   Swelling: none   Skin Exam: ulcer;     Neurovascular  Dorsalis pedis: 1+  Posterior tibial: 1+  Saphenous nerve sensation: diminished  Tibial nerve sensation: diminished  Superficial peroneal nerve sensation: diminished  Deep peroneal nerve sensation: diminished  Sural nerve sensation: diminished      Left Foot/Ankle      Inspection and Palpation  Ecchymosis: none  Tenderness: none   Swelling: none   Skin  Exam: drainage, skin changes and ulcer; (surgical wound 2/2 partial 5th ray amp with irrigation and debridement; proximal plantar sutures remain intact; serosanguinous drainage noted in wound vac cannister; no edema, no erythema, no signs of infection noted)    Neurovascular  Dorsalis pedis: 1+  Posterior tibial: 1+  Saphenous nerve sensation: diminished  Tibial nerve sensation: diminished  Superficial peroneal nerve sensation: diminished  Deep peroneal nerve sensation: diminished  Sural nerve sensation: diminished            Laboratory:  A1C:   Recent Labs   Lab 07/12/19  1023   HGBA1C >14.0*     CBC:   Recent Labs   Lab 07/31/19  0513   WBC 5.38   RBC 3.37*   HGB 9.5*   HCT 31.3*      MCV 93   MCH 28.2   MCHC 30.4*     CRP:   No results for input(s): CRP in the last 168 hours.  ESR:   No results for input(s): SEDRATE in the last 168 hours.  Wound Cultures:   Recent Labs   Lab 07/12/19  1257 07/14/19  1728 07/17/19  1214   LABAERO STREPTOCOCCUS AGALACTIAE (GROUP B)  Few  Beta-hemolytic streptococci are routinely susceptible to   penicillins,cephalosporins and carbapenems.  Susceptibility testing not routinely performed  *  STAPHYLOCOCCUS AUREUS  Few  * STREPTOCOCCUS AGALACTIAE (GROUP B)  Rare  Beta-hemolytic streptococci are routinely susceptible to   penicillins,cephalosporins and carbapenems.  Susceptibility testing not routinely performed  Skin janes also present  * Skin janes,  no predominant organism     Microbiology Results (last 7 days)     Procedure Component Value Units Date/Time    Culture, Anaerobe [[id number]] Collected:  07/17/19 1214    Order Status:  Completed Specimen:  Wound from Foot, Left Updated:  07/26/19 1316     Anaerobic Culture No anaerobes isolated    Narrative:       Left foot wound        Specimen (12h ago, onward)    None          Diagnostic Results:  I have reviewed all pertinent imaging results/findings within the past 24 hours.    Clinical Findings:  Left foot with wound vac  application intact; Running at 125 mmHg continuous pressure. Proximal sutures intact.    Ulcer Location: left lateral plantar foot  Measurements: 8 cm x 4 cm x 1 cm  Periwound: viable/ maceration  Drainage: serosanguinous.  Base:  80% granular. 20% fibrin., wound graft intact  Signs of infection: None. No erythema, no edema, negative probe to bone     7/29              7/26          7/24          7/22              7/20            7/18      7/16      7/15 s/p I&D      7/12 post ED bedside debridement      7/12 pre ED bedside debridement

## 2019-07-31 NOTE — PROGRESS NOTES
Progress Note  Hospital Medicine    Admit Date: 7/12/2019  Length of Stay:  LOS: 19 days     SUBJECTIVE:         Follow-up For:  Bacteremia due to group B Streptococcus    HPI/Interval history (See H&P for complete P,F,SHx) :   Over view  Indio Ryder Jr. is a 49 y.o. male with a PMH of T2DM (poorly  controlled, with long term insulin use) and HTN who presented to the ED on 7/12/2019 diagnosed with  Vomiting (vomiting for past 4 days, denies sick contacts)   Oriented x3 accompanied by daughter at the bedside.  Mentions that he wears lower extremity stockings for edema. Reportedly formed a new ulcer on the left lateral aspect of the foot about 10 days back.  had 4-5 days of nausea and vomiting. Vomiting is non-bloody, No abdominal pain..  It occurs immediately after eating and he has not been able to keep anything down. Associated with fevers and chills but no objective temperature measurement.  At baseline only takes insulin once a day stop taking more than a week because of nausea and vomiting and poor oral intake.  Does not check fingersticks regularly at home. recently lost his insurance and has been unable to follow up with wound care.  His last follow-up with primary care provider and podiatrist was about 2 years.  He has been managing his right foot ulcer at home with dressing but over the past 2 weeks an ulcer on his left foot has developed. He denies any pain or injury- has chronic numbness of bilateral lower extremities and hands from diabetic neuropathy.  Found to be in DKA in the emergency department with beta hydroxybutyrate elevated at 4.1.  Started on insulin drip    07/13/2019   Temperature of 101.5 last night.  Anion gap resolved- bicarbonate 23, insulin drip discontinued.  Started on Levemir 23 units q.a.m. 7 units as part t.i.d. with meals with low-dose sliding scale.  Diabetic diet and NPO from midnight for possible intervention.  MRI of the foot- Ulceration involving the lateral plantar  aspect of the distal left foot, with findings concerning for exposed bone involving the distal aspect of the 5th metatarsal and proximal phalange of the 5th toe.  There are associated changes concerning for osteomyelitis involving the distal aspect of the 5th metatarsal and entirety of the right toe.  There is osseous hyperemia involving the proximal phalange of the 2nd toe, early changes of osteomyelitis are a consideration . aerobic culture  and blood culture with Group B strep. discontinued vancomycin       07/14/2019  Had urinary retention of 800 mL but spontaneously voided.  Started on transitional insulin drip as NPO from midnight for OR today.  Hypokalemia repeat.  Blood cultures daily until clear.  Aerobic cultures with group B Streptococcus and Staph aureus.  Added vancomycin ( monitor for toxicity)    07/15/2019   s/p I&D and 5th ray amputation in the OR  on  07/14/2019.  ALISSA ordered to evaluate vascular status intraoperative Cultures pending . clean margin cultures and pathology pending. Deescalated from vanc and zosyn to cefazolin 6 gram IV cont infusion.  blood cultures from 07/13/2019 no growth.  Continues to have nausea vomiting since unable to tolerate anything by mouth.  Started on IV hydration and Reglan    07/23/2019  underwent repeat I&D and left partial 5th ray amputation 7/17.   Per OP note,  purulent drainage was expressed proximal to distal from the level of the plantar aspect of the left calcaneus to the open wound on the plantar midfoot. Purulent drainage was also manually expressed from the plantar distal aspect of the forefoot. Cultures of this were sent.   A small portion of the distal metatarsal was reported as sent as a clean margin to Micro and pathology.  The wound was partially closed, Neox allograft and wound Vac placed.  Repeat blood cultures remain NGTD.   ABIs without significant PAD.   Tolerating oral diet without nausea and vomiting.  Status post PICC line placement on  07/23/2019.  Wound VAC in place to left foot.  PT evaluation, crutches ordered per Podiatry.  No weight-bearing to left foot    7/27/19  Insurance denied rehab placement.  s/p  peer to peer. ID recommending  Cefazolin 6 gram IV continuous infusion q 24 hours for 6 weeks from date of surgery as unclear if residual osteomyelitis is present without clean margin culture and pathology results (end date 8/28/19). will qualify for SNF per insurance     Interval history   . started on gabapentin for neuropathic pain in  hands    Review of Systems: List if applicable  Constitutional: Positive for activity change, appetite change (Poor appetite for the last and), chills, fatigue, fever ( weight loss) and unexpected weight change.   HENT: Negative for congestion, ear discharge, ear pain, facial swelling and sore throat.    Eyes: Negative for pain, discharge and itching.   Respiratory: Positive for shortness of breath ( at rest and exertion ). Negative for cough, chest tightness and wheezing.    Cardiovascular: Positive for leg swelling ( chronic). Negative for chest pain and palpitations.   Gastrointestinal: Positive for vomiting improved. Negative for abdominal distention, abdominal pain, blood in stool, constipation and diarrhea.   Endocrine: Negative for cold intolerance.   Genitourinary: Negative for dysuria, hematuria and urgency.   Musculoskeletal: Negative for arthralgias, gait problem, myalgias, neck pain and neck stiffness.   Skin: Negative for color change, pallor and rash.   Allergic/Immunologic: Negative for environmental allergies.   Neurological: Positive for weakness and numbness ( bilateral lower extremities and hands attributes to diabetic neuropathy). Negative for dizziness, syncope, light-headedness and headaches.   Hematological: Negative for adenopathy.   Psychiatric/Behavioral: Negative for agitation, behavioral problems and confusion.     OBJECTIVE:     Vital Signs Range (Last 24H):  Temp:  [97.7 °F  (36.5 °C)-98.9 °F (37.2 °C)]   Pulse:  []   Resp:  [16-18]   BP: (105-127)/(69-77)   SpO2:  [95 %-98 %]     Physical Exam:  Constitutional: He is oriented to person, place, and time. He appears well-developed and well-nourished.   HENT:   Head: Normocephalic and atraumatic.   Mouth/Throat: Oropharynx is clear and moist. No oropharyngeal exudate.   Eyes: Pupils are equal, round, and reactive to light. Conjunctivae and EOM are normal. Right eye exhibits no discharge. Left eye exhibits no discharge. No scleral icterus.   Neck: Normal range of motion. Neck supple. No JVD present. No tracheal deviation present. No thyromegaly present.   Cardiovascular: Normal rate, regular rhythm and normal heart sounds. Exam reveals no gallop and no friction rub.   No murmur heard.  Pulmonary/Chest: Effort normal and breath sounds normal. No stridor. No respiratory distress. He has no wheezes. He has no rales.   Abdominal: Soft. Bowel sounds are normal. He exhibits no distension and no mass. There is no tenderness. There is no guarding.   Musculoskeletal: Normal range of motion. He exhibits edema.   Lymphadenopathy:     He has no cervical adenopathy.   Neurological: He is oriented to person, place, and time. No cranial nerve deficit.   Able to move upper and lower extremities without limitation   Skin: Skin is warm and dry.   Left foot:  With wound VAC in place.     Right foot: 1.5cm clean based ulcer at base of right 5th MTP joint. Right great toe and second toe surgically amputated.     Psychiatric: He has a normal mood and affect. His behavior is normal.   Nursing note and vitals reviewed    Medications:  Medication list was reviewed and changes noted under Assessment/Plan.      Current Facility-Administered Medications:     acetaminophen tablet 650 mg, 650 mg, Oral, Q6H PRN, Vicente Wick MD, 650 mg at 07/23/19 1938    amLODIPine tablet 10 mg, 10 mg, Oral, Daily, Vicente Wick MD, Stopped at 07/30/19 0904     atorvastatin tablet 80 mg, 80 mg, Oral, Daily, Vicente Wick MD, 80 mg at 07/31/19 0923    calcium carbonate 200 mg calcium (500 mg) chewable tablet 1,000 mg, 1,000 mg, Oral, BID PRN, Antonio Tate PA-C, 1,000 mg at 07/25/19 1601    ceFAZolin (ANCEF) 6 g in dextrose 5 % 500 mL CONTINUOUS INFUSION, 6 g, Intravenous, Q24H, RAMILA Kinsey, 6 g at 07/30/19 1400    dextrose 10% (D10W) Bolus, 12.5 g, Intravenous, PRN, Nakul Patel MD    dextrose 10% (D10W) Bolus, 25 g, Intravenous, PRN, Nakul Patel MD    enoxaparin injection 40 mg, 40 mg, Subcutaneous, Daily, Vicente Wick MD, 40 mg at 07/30/19 1655    [COMPLETED] gabapentin capsule 300 mg, 300 mg, Oral, Once, 300 mg at 07/28/19 1500 **FOLLOWED BY** [COMPLETED] gabapentin capsule 300 mg, 300 mg, Oral, BID, 300 mg at 07/29/19 2046 **FOLLOWED BY** gabapentin capsule 300 mg, 300 mg, Oral, TID, Vicente Wick MD, 300 mg at 07/31/19 0923    glucagon (human recombinant) injection 1 mg, 1 mg, Intramuscular, PRN, Nakul Patel MD    glucose chewable tablet 16 g, 16 g, Oral, PRN, Nakul Patel MD    glucose chewable tablet 24 g, 24 g, Oral, PRN, Nakul Patel MD    hydrALAZINE tablet 25 mg, 25 mg, Oral, Q8H PRN, Vicente Wick MD, 25 mg at 07/23/19 0804    insulin aspart U-100 pen 0-5 Units, 0-5 Units, Subcutaneous, QID (AC + HS) PRN, Nakul Patel MD, 2 Units at 07/30/19 0755    insulin aspart U-100 pen 13 Units, 13 Units, Subcutaneous, TIDWM, Roselyn Chow MD, 13 Units at 07/31/19 1203    insulin aspart U-100 pen 3 Units, 3 Units, Subcutaneous, PRN, Javid Saini MD    insulin detemir U-100 pen 41 Units, 41 Units, Subcutaneous, QHS, Roselyn Chow MD, 41 Units at 07/30/19 2137    ondansetron injection 8 mg, 8 mg, Intravenous, Q8H PRN, Zeynep Duke PA-C    promethazine (PHENERGAN) 12.5 mg in dextrose 5 % 50 mL IVPB, 12.5 mg, Intravenous, Q6H PRN, Zeynep Duke PA-C, Last Rate: 150 mL/hr at 07/18/19  0012, 12.5 mg at 07/18/19 0012    sodium chloride 0.9% flush 10 mL, 10 mL, Intravenous, PRN, Vicente Wick MD    sodium chloride 0.9% flush 10 mL, 10 mL, Intravenous, PRN, Mariela Damon MD    Flushing PICC Protocol, , , Until Discontinued **AND** sodium chloride 0.9% flush 10 mL, 10 mL, Intravenous, Q6H, 10 mL at 07/30/19 1700 **AND** sodium chloride 0.9% flush 10 mL, 10 mL, Intravenous, PRN, Vicente Wick MD    acetaminophen, calcium carbonate, Dextrose 10% Bolus, Dextrose 10% Bolus, glucagon (human recombinant), glucose, glucose, hydrALAZINE, insulin aspart U-100, insulin aspart U-100, ondansetron, promethazine (PHENERGAN) IVPB, sodium chloride 0.9%, sodium chloride 0.9%, Flushing PICC Protocol **AND** sodium chloride 0.9% **AND** sodium chloride 0.9%    Laboratory/Diagnostic Data:  Reviewed and noted in plan where applicable- Please see chart for full lab data.    Recent Labs   Lab 07/29/19 0530 07/30/19 0510 07/31/19 0513   WBC 5.66 5.61 5.38   HGB 10.4* 10.1* 9.5*   HCT 33.3* 33.8* 31.3*    244 221       Recent Labs   Lab 07/27/19 2302 07/28/19 0519 07/29/19 0530 07/30/19  0510 07/31/19  0513    139 137  --   --    K 4.0 4.4 4.2  --   --    CL 96 98 96  --   --    CO2 33* 30* 32*  --   --    BUN 14 13 19  --   --    CREATININE 0.8 0.9 1.1  --   --    * 251* 266*  --   --    CALCIUM 9.6 9.8 9.5  --   --    MG  --  1.6 1.6 1.6 1.6   PHOS  --  4.2 3.7 3.1 3.2       Recent Labs   Lab 07/27/19 2302 07/28/19  0519 07/29/19  0530   ALKPHOS 154* 155* 151*   ALT 9* 10 9*   AST 29 29 17   ALBUMIN 2.5* 2.5* 2.6*   PROT 7.2 7.3 7.5   BILITOT 0.2 0.2 0.2        Microbiology labs for the last week  Microbiology Results (last 7 days)     Procedure Component Value Units Date/Time    Culture, Anaerobe [[id number]] Collected:  07/17/19 1214    Order Status:  Completed Specimen:  Wound from Foot, Left Updated:  07/26/19 1316     Anaerobic Culture No anaerobes isolated    Narrative:        Left foot wound           Imaging Results          MRI Foot (Forefoot) Left Without Contrast (Final result)  Result time 07/12/19 19:07:38    Final result by Bob Oglesby MD (07/12/19 19:07:38)                 Impression:      Ulceration involving the lateral plantar aspect of the distal left foot, with findings concerning for exposed bone involving the distal aspect of the 5th metatarsal and proximal phalange of the 5th toe.  There are associated changes concerning for osteomyelitis involving the distal aspect of the 5th metatarsal and entirety of the right toe.  There is osseous hyperemia involving the proximal phalange of the 2nd toe, early changes of osteomyelitis are a consideration no definite low signal on T1 at this time.      Electronically signed by: Bob Oglesby MD  Date:    07/12/2019  Time:    19:07             Narrative:    EXAMINATION:  MRI FOOT (FOREFOOT) LEFT WITHOUT CONTRAST    CLINICAL HISTORY:  Open wound of ankle, foot and toes;    TECHNIQUE:  Multiplanar multisequence MRI images of the left forefoot were obtained without administration of IV contrast.    COMPARISON:  Radiograph 07/12/2019    FINDINGS:  There is soft tissue ulceration involving the lateral plantar aspect of the right foot, noting there appears to be exposed bone in the location, likely involving the distal aspect of the 5th metatarsal head, and proximal phalange of the 5th toe. There is diffusely increased STIR signal within the distal aspect of the 5th metatarsal, and throughout the phalanges of the 5th toe. There is corresponding low T1 signal involving the same regions, concerning for osteomyelitis. There is additional abnormal STIR signal within the base of the proximal phalange of the 2nd toe, without convincing low signal on T1, suggesting osseous hyperemia although early changes of osteomyelitis remain a consideration. There is edema and induration about the open wound, no convincing focal organized fluid  collection.  The remainder of the osseous structures are grossly unremarkable. No significant joint effusions.  There is reactive edema about the intrinsic musculature of the foot, as well as along the dorsal surface.                                X-Ray Foot Complete Left (Final result)  Result time 07/12/19 11:06:16    Final result by Chandan Montana MD (07/12/19 11:06:16)                 Impression:      Abnormal examination with findings strongly suggesting osteomyelitis involving the base of the proximal phalanx of the left 5th toe and likely the head of the 5th metatarsal as well.    This report was flagged in Epic as abnormal.      Electronically signed by: Chandan Montana MD  Date:    07/12/2019  Time:    11:06             Narrative:    EXAMINATION:  XR FOOT COMPLETE 3 VIEW LEFT    TECHNIQUE:  Three views of the left foot were obtained, with AP, lateral, and oblique projections submitted.    COMPARISON:  No relevant comparison examinations are currently available.    FINDINGS:  Focal soft tissue loss/irregularity involving the region of the left 5th MTP joint is observed, with gas present within the soft tissues at this level, the findings likely related to a clinically evident ulceration.  There is focal lytic bone destruction involving the base of the proximal phalanx of the 5th toe, and possibly involving the head of the 5th metatarsal as well, this radiographic appearance strongly suggesting osteomyelitis.  The remaining osseous structures appear intact, with no evidence of recent fracture and no additional areas of focal lytic bone destruction noted.  Some arterial vascular calcification in the soft tissues about the ankle is incidentally observed.                               X-Ray Chest AP Portable (Final result)  Result time 07/12/19 10:48:19    Final result by Chandan Montana MD (07/12/19 10:48:19)                 Impression:      No significant intrathoracic abnormality.  Allowing for differences in  "projection and a poorer inspiratory depth level on the current examination, there has been no significant detrimental interval change in the appearance of the chest since 05/03/2016.      Electronically signed by: Chandan Montana MD  Date:    07/12/2019  Time:    10:48             Narrative:    EXAMINATION:  XR CHEST AP PORTABLE    CLINICAL HISTORY:  hyperglycemia;    COMPARISON:  Comparison is made to the most recent prior chest radiograph, dated 05/03/2016.    FINDINGS:  Heart size is normal, as is the appearance of the pulmonary vascularity.  Lung zones are clear, and free of significant airspace consolidation or volume loss.  No pleural fluid.  No abnormal mediastinal widening.  No pneumothorax.  Incidental note is made of some spurring at the right acromioclavicular joint level.                                Estimated body mass index is 25.77 kg/m² as calculated from the following:    Height as of this encounter: 6' 1" (1.854 m).    Weight as of this encounter: 88.6 kg (195 lb 5.2 oz).    I & O (Last 24H):    Intake/Output Summary (Last 24 hours) at 7/31/2019 1333  Last data filed at 7/31/2019 0753  Gross per 24 hour   Intake 0 ml   Output 1875 ml   Net -1875 ml       Estimated Creatinine Clearance: 89.8 mL/min (based on SCr of 1.1 mg/dL).    ASSESSMENT/PLAN:     Active Problems:    Active Diagnoses:     Diagnosis Date Noted POA    PRINCIPAL PROBLEM:  Acute osteomyelitis [M86.10] x-ray left foot - strongly suggesting osteomyelitis involving the base of the proximal phalanx of the left 5th toe and likely the head of the 5th metatarsal as well.        recently lost his insurance and has been unable to follow up with wound care.  His last follow-up with primary care provider and podiatrist was about 2 years.  He has been managing his right foot ulcer at home with dressing but over the past 2 weeks an ulcer on his left foot has developed. He denies any pain or injury- has chronic numbness of bilateral lower " extremities and hands from diabetic neuropathy.  Started on IV Zosyn and vanc.  Podiatry and Infectious Disease consult.  aerobic culture with Group B strep.  Add vancomycin for Staph aureus  (monitor for toxicity)    07/14/2019   s/p I&D and 5th ray amputation in the OR  on  07/14/2019.  No weight-bearing left lower x-ray ALISSA ordered to evaluate vascular status intraoperative Cultures pending . clean margin cultures and pathology pending. Deescalated from vanc and zosyn to cefazolin 6 gram IV cont infusion.  blood cultures from 07/13/2019 no growth.  Continues to have nausea vomiting since unable to tolerate anything by mouth.  Started on IV hydration and Reglan    07/23/19  underwent repeat I&D and left partial 5th ray amputation 7/17.   Per OP note,  purulent drainage was expressed proximal to distal from the level of the plantar aspect of the left calcaneus to the open wound on the plantar midfoot. Purulent drainage was also manually expressed from the plantar distal aspect of the forefoot. Cultures of this were sent.   A small portion of the distal metatarsal was reported as sent as a clean margin to Micro and pathology.  The wound was partially closed, Neox allograft and wound Vac placed.  Repeat blood cultures remain NGTD.   ABIs without significant PAD.   Tolerating oral diet without nausea and vomiting.  Status post PICC line placement on 07/23/2019.  Wound VAC in place to left foot.  PT evaluation, crutches ordered per Podiatry.  No weight-bearing to left foot .     7/27/19  Insurance denied rehab placement.  s/p  peer to peer. ID recommending  Cefazolin 6 gram IV continuous infusion q 24 hours for 6 weeks from date of surgery as unclear if residual osteomyelitis is present without clean margin culture and pathology results (end date 8/28/19). will qualify for SNF per insurance    07/12/2019 Yes    Sepsis [A41.9] /bacteremia - blood cultures from 07/12/2019 group B Streptococcus. secondary to diabetic  foot ulcer.  Elevated ESR greater than 120 As above 07/12/2019 Yes    Leukocytosis [D72.829] 14 secondary to sepsis. resolved  07/12/2019 Unknown    Anemia [D64.9] hemoglobin at 10.4, stable, normocytic.  Monitor 07/12/2019 Unknown    Type 2 diabetes mellitus with left eye affected by mild nonproliferative retinopathy without macular edema, without long-term current use of insulin [E11.3292]At baseline only takes insulin once a day stopped  taking more than a week because of nausea and vomiting and poor oral intake.  Does not check fingersticks regularly at home. recently lost his insurance.  Obtain HbA1c elevated greater than 14.   on Levemir 28 units nightly and NovoLog 11 units t.i.d. with meals 08/11/2017 Yes       Chronic    Diabetic ketoacidosis without coma associated with type 2 diabetes mellitus [E11.10]  Found to be in DKA in the emergency department with beta hydroxybutyrate elevated at 4.1.  Started on insulin drip.  Received normal saline in the emergency department    07/13/2019  Anion gap resolved- bicarbonate 23, insulin drip discontinued.        hypomagnesemia replaced    Hypophosphatemia- placed   05/30/2017 Unknown    Type 2 diabetes mellitus with diabetic neuropathy, with long-term current use of insulin [E11.40, Z79.4] .  Blood glucose uncontrolled continue with Levemir 37 units q.h.s. and NovoLog 12 units with meals and low-dose correction scale. started on gabapentin for neuropathic pain in  hands (7/28/19) 05/03/2016 Not Applicable       Chronic    Mixed hyperlipidemia [E78.2] continue with Lipitor 08/12/2014 Yes    Essential hypertension [I10]  amlodipine dose increased to 10 mg daily.  06/06/2013 Yes       Chronic               Disposition- SNF    DVT prophylaxis addressed with:  Brian Rust M.D.

## 2019-07-31 NOTE — PT/OT/SLP PROGRESS
"Physical Therapy Treatment    Patient Name:  Indio Ryder Jr.   MRN:  [id number]  Admitting Diagnosis: Bacteremia due to group B Streptococcus  Recent Surgery: Procedure(s) (LRB):  DEBRIDEMENT, FOOT with leftt 5th ray partial amputation with Neox Graft (Left) 14 Days Post-Op    Recommendations:     Discharge Recommendations:  rehabilitation facility   Discharge Equipment Recommendations: shower chair, walker, rolling   Barriers to discharge: - Decreased functional mobility increasing fall risk Pt requiring increased assistance at current time.     Plan:     During this hospitalization, patient to be seen 3 x/week to address the above listed problems via gait training, therapeutic activities, therapeutic exercises, neuromuscular re-education  · Plan of Care Expires:  08/24/19   Plan of Care Reviewed with: patient    This Plan of care has been discussed with the patient who was involved in its development and understands and is in agreement with the identified goals and treatment plan    Subjective     Communicated with RN (Yvonne) prior to session.     Patient comments: "I have neuropathy in my arms"  Pain/Comfort:  · Pain Rating 1: 9/10  · Location - Side 1: Bilateral  · Location - Orientation 1: generalized  · Location 1: (arms )  · Pain Addressed 1: Pre-medicate for activity, Reposition, Distraction, Cessation of Activity, Nurse notified  · Pain Rating Post-Intervention 1: (No rating provided)    Objective:     Patient found with: PICC line, telemetry, wound vac(waffle overlay)    Patient found sup in bed sleeping soundly upon PT entry to room, agreeable to treatment.  No family present in the room.    General Precautions: Standard, fall   Orthopedic Precautions:LLE non weight bearing   Braces: N/A       BED MOBILITY (vc's for hand placement sequencing of task):        Rolling to the R:  NT.       Rolling to the L:  Mod I.        Sup > sit at the EOB:  Mod I, exiting on the L side.       Sit > sup:  Not " performed 2* pt left seated C.       Scooting hips to EOB with SBA    PTA dons DARCO shoe to R foot prior to standing     TRANSFERS  (vc's for hand placement, sequencing of task and safety)   Patient completed Sit <> Stand Transfer from EOB with CGA/SBA for safety with rolling walker x2 trial(s)   Patient completed Stand <> Sit Transfer to EOB/BS chair with SBA for safety with rolling walker      GAIT: within the room and wound vac in tow   Patient ambulated: 40ft   Patient required: CGA < > SBA for balance and safety   Patient used:  Rolling Walker   Gait Pattern observed: swing-to gait   Gait Deviation(s): decreased tommy and FFP and mild instability    Comments: vc's for directional guidance, placement of AD, tommy, L NWB prec and safety      EDUCATION  Patient provided with daily orientation and goals of this PT session. They were educated to call for assistance and to transfer with hospital staff only.  Also, pt was educated on the effects of prolonged immobility and the importance of performing OOB activity and exercises to promote healing and reduce recovery time    Whiteboard updated with correct mobility information. RN/PCT notified.  Pt safe to transfer OOB/BTB and amb with RN/PCT: Use RW with L NWB prec CGA of 1 person.    Patient left up in chair, with  all lines intact, call button in reach and RN notified    AM-PAC 6 CLICK MOBILITY  Turning over in bed (including adjusting bedclothes, sheets and blankets)?: 4  Sitting down on and standing up from a chair with arms (e.g., wheelchair, bedside commode, etc.): 3  Moving from lying on back to sitting on the side of the bed?: 4  Moving to and from a bed to a chair (including a wheelchair)?: 3  Need to walk in hospital room?: 3  Climbing 3-5 steps with a railing?: 2  Basic Mobility Total Score: 19     Assessment:     Indio Ryder JrMarshall is a 49 y.o. male admitted with a medical diagnosis of Bacteremia due to group B Streptococcus.  He presents with  the following impairments/functional limitations:  weakness, impaired endurance, impaired sensation, impaired self care skills, impaired functional mobilty, gait instability, impaired balance, decreased upper extremity function, decreased lower extremity function, pain, impaired skin, edema. requiring  assistance and verbal cues for transfers, standing and gait to prevent falls due to weakness, pain, instability and fatigue.   In light of pt's current functional level and deficits, it is anticipated that pt will need to participate in an intense rehab program consisting of PT and OT in order to achieve full rehab potential to return to previous level of function and roles.  Pt remains motivated to participate in PT session and will cont to benefit from skilled PT intervention.    Rehab Prognosis:  Good; patient would benefit from acute skilled PT services to address these deficits and reach maximum level of function.      GOALS:   Multidisciplinary Problems     Physical Therapy Goals        Problem: Physical Therapy Goal    Goal Priority Disciplines Outcome Goal Variances Interventions   Physical Therapy Goal     PT, PT/OT Ongoing (interventions implemented as appropriate)     Description:  Goals to be met by: 2019    Patient will increase functional independence with mobility by performin. Supine to sit with Modified [place] met 2019  2. Sit to supine with Modified [place]  3. Sit to stand transfer with Supervision met 2019  4. Gait  x 20 feet with Stand-by Assistance using LRAD met 2019  5. Lower extremity exercise program x15 reps per handout, with independence                      Time Tracking:     PT Received On: 19  PT Start Time: 1402     PT Stop Time: 1430  PT Total Time (min): 28 min     Billable Minutes: Gait Training 15 and Therapeutic Activity 13    Treatment Type: Treatment  PT/PTA: PTA     PTA Visit Number: 2       Willow Coon PTA.  Pager  [phone]    7/31/2019    .

## 2019-07-31 NOTE — PLAN OF CARE
07/31/19 1035   Post-Acute Status   Post-Acute Authorization Placement   Post-Acute Placement Status Referrals Sent     ALIYAH spoke with Brittni with [facility] in admissions and she reports that she will follow up with ALIYAH .      Josefina Hammond LMSW  [facility]   [id number]

## 2019-07-31 NOTE — PLAN OF CARE
07/31/19 1427   Post-Acute Status   Post-Acute Authorization Placement   Post-Acute Placement Status Referrals Sent     SW sent referral to [facility] for SNF placement and will follow up.    Josefina Hammond LMSW  [facility]   [id number]

## 2019-07-31 NOTE — SUBJECTIVE & OBJECTIVE
Subjective:     Interval History:   Patient seen at bedside s/p left foot partial 5th ray amputation with irrigation and debridement and application of graft (DOS: 7/17/2019). No acute distress. Denies any pedal pain. Denies calf pain. No new complaints. Dressings to left foot clean, dry, intact. Wound vac to left foot wound intact running @ 125 mmHg continuous pressure. Admits to nausea since ED admission, no vomiting, fever, chills.     7/20 Patient Seen at bedside for dressing change.  Wound vac intact.  Patient denies F/C/N/V.    7/22 Patient Seen at bedside for dressing change.  Wound vac intact.  Patient denies F/C/N/V.    7/24 Patient seen at bedside for dressing change. Wound vac to left foot intact running at 125 mmHg continuous pressure. Patient denies nausea, vomiting, fever, chills. Denies any pain. No acute distress. No new complaints    7/26: Patient Seen at bedside for dressing change.  Wound vac intact.  Patient denies F/C/N/V.    Follow-up For: Procedure(s) (LRB):  DEBRIDEMENT, FOOT with leftt 5th ray partial amputation with Neox Graft (Left)    Post-Operative Day: 9 Day Post-Op    Scheduled Meds:   amLODIPine  10 mg Oral Daily    atorvastatin  80 mg Oral Daily    ceFAZolin(ANCEF) in D5W 500 mL CONTINUOUS INFUSION  6 g Intravenous Q24H    enoxaparin  40 mg Subcutaneous Daily    insulin aspart U-100  11 Units Subcutaneous TIDWM    insulin detemir U-100  28 Units Subcutaneous QHS    potassium, sodium phosphates  2 packet Oral QID (AC & HS)    sodium chloride 0.9%  10 mL Intravenous Q6H     Continuous Infusions:    PRN Meds:acetaminophen, calcium carbonate, Dextrose 10% Bolus, Dextrose 10% Bolus, glucagon (human recombinant), glucose, glucose, hydrALAZINE, insulin aspart U-100, insulin aspart U-100, ondansetron, promethazine (PHENERGAN) IVPB, sodium chloride 0.9%, sodium chloride 0.9%, Flushing PICC Protocol **AND** sodium chloride 0.9% **AND** sodium chloride 0.9%    Review of Systems  Patient is returned a call back to RN. Please call patient @ HOME: [phone]   WORK: N/A   CELL: [phone]          Constitutional: Negative for appetite change, chills and fever.   Eyes: Negative for visual disturbance.   Respiratory: Negative for cough and shortness of breath.    Cardiovascular: Negative for chest pain and leg swelling.   Gastrointestinal: Negative for abdominal pain, constipation, diarrhea, nausea and vomiting.   Genitourinary: Negative for dysuria, frequency and hematuria.   Musculoskeletal: Negative for back pain and myalgias.   Skin: Positive for wound (left foot surgical wound; right foot plantar ulcer). Negative for color change and rash.   Neurological: Negative for dizziness, light-headedness and headaches.   Psychiatric/Behavioral: Negative for agitation and behavioral problems. The patient is not nervous/anxious.      Objective:     Vital Signs (Most Recent):  Temp: 97.9 °F (36.6 °C) (07/26/19 1714)  Pulse: (!) 117 (07/26/19 1731)  Resp: 16 (07/26/19 0846)  BP: 104/62 (07/26/19 1731)  SpO2: 99 % (07/26/19 1714) Vital Signs (24h Range):  Temp:  [97.8 °F (36.6 °C)-99.1 °F (37.3 °C)] 97.9 °F (36.6 °C)  Pulse:  [] 117  Resp:  [16-18] 16  SpO2:  [97 %-99 %] 99 %  BP: ()/(57-89) 104/62     Weight: 87.3 kg (192 lb 7.4 oz)  Body mass index is 25.39 kg/m².    Foot Exam    General  Orientation: alert and oriented to person, place, and time       Right Foot/Ankle     Inspection and Palpation  Tenderness: none   Swelling: none   Skin Exam: ulcer;     Neurovascular  Dorsalis pedis: 1+  Posterior tibial: 1+  Saphenous nerve sensation: diminished  Tibial nerve sensation: diminished  Superficial peroneal nerve sensation: diminished  Deep peroneal nerve sensation: diminished  Sural nerve sensation: diminished      Left Foot/Ankle      Inspection and Palpation  Ecchymosis: none  Tenderness: none   Swelling: none   Skin Exam: drainage, skin changes and ulcer; (surgical wound 2/2 partial 5th ray amp with irrigation and debridement; proximal plantar sutures remain intact; serosanguinous drainage noted in  wound vac cannister; no edema, no erythema, no signs of infection noted)    Neurovascular  Dorsalis pedis: 1+  Posterior tibial: 1+  Saphenous nerve sensation: diminished  Tibial nerve sensation: diminished  Superficial peroneal nerve sensation: diminished  Deep peroneal nerve sensation: diminished  Sural nerve sensation: diminished            Laboratory:  A1C:   Recent Labs   Lab 07/12/19  1023   HGBA1C >14.0*     CBC:   Recent Labs   Lab 07/26/19  0526   WBC 7.63   RBC 4.17*   HGB 11.6*   HCT 37.3*   *   MCV 89   MCH 27.8   MCHC 31.1*     CRP:   No results for input(s): CRP in the last 168 hours.  ESR:   No results for input(s): SEDRATE in the last 168 hours.  Wound Cultures:   Recent Labs   Lab 07/12/19  1257 07/14/19  1728 07/17/19  1214   LABAERO STREPTOCOCCUS AGALACTIAE (GROUP B)  Few  Beta-hemolytic streptococci are routinely susceptible to   penicillins,cephalosporins and carbapenems.  Susceptibility testing not routinely performed  *  STAPHYLOCOCCUS AUREUS  Few  * STREPTOCOCCUS AGALACTIAE (GROUP B)  Rare  Beta-hemolytic streptococci are routinely susceptible to   penicillins,cephalosporins and carbapenems.  Susceptibility testing not routinely performed  Skin janes also present  * Skin janes,  no predominant organism     Microbiology Results (last 7 days)     Procedure Component Value Units Date/Time    Culture, Anaerobe [[id number]] Collected:  07/17/19 1214    Order Status:  Completed Specimen:  Wound from Foot, Left Updated:  07/26/19 1316     Anaerobic Culture No anaerobes isolated    Narrative:       Left foot wound    Fungus culture [[id number]] Collected:  07/17/19 1214    Order Status:  Completed Specimen:  Wound from Foot, Left Updated:  07/24/19 1044     Fungus (Mycology) Culture Culture in progress    Narrative:       Left foot wound    Blood culture [[id number]] Collected:  07/15/19 0305    Order Status:  Completed Specimen:  Blood from Antecubital, Right Updated:  07/20/19 0822     Blood  Culture, Routine No growth after 5 days.    Blood culture [[id number]] Collected:  07/15/19 0305    Order Status:  Completed Specimen:  Blood from Antecubital, Right Updated:  07/20/19 0822     Blood Culture, Routine No growth after 5 days.        Specimen (12h ago, onward)    None          Diagnostic Results:  I have reviewed all pertinent imaging results/findings within the past 24 hours.    Clinical Findings:  Left foot with wound vac application intact; Running at 125 mmHg continuous pressure. Proximal sutures intact.    Ulcer Location: left lateral plantar foot  Measurements: 8 cm x 4 cm x 1 cm  Periwound: viable/ maceration  Drainage: serosanguinous.  Base:  80% granular. 20% fibrin., wound graft intact  Signs of infection: None. No erythema, no edema, negative probe to bone    7/26          7/24          7/22              7/20            7/18      7/16      7/15 s/p I&D      7/12 post ED bedside debridement      7/12 pre ED bedside debridement

## 2019-07-31 NOTE — PLAN OF CARE
Problem: Physical Therapy Goal  Goal: Physical Therapy Goal  Goals to be met by: 2019    Patient will increase functional independence with mobility by performin. Supine to sit with Modified [place] met 2019  2. Sit to supine with Modified [place]  3. Sit to stand transfer with Supervision met 2019  4. Gait  x 20 feet with Stand-by Assistance using LRAD met 2019  5. Lower extremity exercise program x15 reps per handout, with independence       Discharge Recommendations: Rehab    Pt safe to transfer OOB/BTB and amb with RN/PCT: Use RW with L NWB prec CGA of 1 person.    Goals remain appropriate.     Willow Coon, PTA.   [phone]   2019

## 2019-07-31 NOTE — CONSULTS
[facility]  Infectious Disease  Consult Note    Patient Name: Indio Ryder Jr.  MRN: [id number]  Admission Date: 7/12/2019  Hospital Length of Stay: 19 days  Attending Physician: Luis Rust MD  Primary Care Provider: Lorena Thakur MD     Isolation Status: No active isolations    Inpatient consult to Infectious Diseases  Consult performed by: Florence Doe PA-C  Consult ordered by: Luis Rust MD              ID consult received. Chart being reviewed. Full note with recommendations to follow.    Thank you,  Florence Doe PA-C

## 2019-07-31 NOTE — ASSESSMENT & PLAN NOTE
DKA is now resolved.     Goal BG while inpatient: 140-180 mg/dl  Current BG levels are at goal    Pt's current insulin requirements might be higher due to infection.    Plan:   -Continue Detemir 41 units qHS   -Continue Novolog 13 units with meals   -Continue low dose correction   -Novolog 3 units PRN with snacks overnight  -POC AC/HS    Discharge Recommendations: TBD

## 2019-07-31 NOTE — PROGRESS NOTES
"[facility]  Endocrinology  Progress Note    Admit Date: 2019     Reason for Consult: Management of T2DM, Hyperglycemia, DKA    Surgical Procedure and Date: 19    Diabetes diagnosis year: >20 years, 1990s?    Home Diabetes Medications:  Metformin 500mg BID, Lantus 26U QHS    How often checking glucose at home? None  BG readings on regimen: n/a  Hypoglycemia on the regimen?  No  Missed doses on regimen?  No    Diabetes Complications include:     Hyperglycemia and Foot ulcer      Complicating diabetes co morbidities:  HTN      HPI:   Patient is a 49 y.o. male with a diagnosis of Type 2 DM (noncomplaince, skip Levemir 1-2x a week), HTN, PVD, hx of right foot osteomyelitis who was admitted to hospital medicine for DKA and L foot ulcer. He report  4-5 days of nausea and vomiting. Vomiting is non-bloody. He has not been able to keep anything down. Pt hasn't been administering his insulin during this time due to the fact that he has not been able to keep anything down. He was found to have a large open wound on L foot, pt recently lost his insurance and has been unable to follow up with wound care. Xray showed new Osteomyelitis of L foot.  Pt reported skipping his Lantus at home 1-2x a week, not on insulins with meal. Was on Trulicity but stopped due to lost of insurance. BHB was 4.1, A1C >14, Anion gap of 21, glucose 349. Endocrine was consulted for DKA, diabetes management.     Interval HPI:   Overnight events:  VS stable    Eatin%  Nausea: No  Hypoglycemia and intervention: No  Fever: No  TPN and/or TF: No    /72 (BP Location: Left arm, Patient Position: Lying)   Pulse 99   Temp 97.8 °F (36.6 °C) (Oral)   Resp 16   Ht 6' 1" (1.854 m)   Wt 88.6 kg (195 lb 5.2 oz)   SpO2 96%   BMI 25.77 kg/m²      Labs Reviewed and Include    No results for input(s): GLU, CALCIUM, ALBUMIN, PROT, NA, K, CO2, CL, BUN, CREATININE, ALKPHOS, ALT, AST, BILITOT in the last 24 hours.  Lab Results "   Component Value Date    WBC 5.38 07/31/2019    HGB 9.5 (L) 07/31/2019    HCT 31.3 (L) 07/31/2019    MCV 93 07/31/2019     07/31/2019     No results for input(s): TSH, FREET4 in the last 168 hours.  Lab Results   Component Value Date    HGBA1C >14.0 (H) 07/12/2019       Nutritional status:   Body mass index is 25.77 kg/m².  Lab Results   Component Value Date    ALBUMIN 2.6 (L) 07/29/2019    ALBUMIN 2.5 (L) 07/28/2019    ALBUMIN 2.5 (L) 07/27/2019     No results found for: PREALBUMIN    Estimated Creatinine Clearance: 89.8 mL/min (based on SCr of 1.1 mg/dL).    Accu-Checks  Recent Labs     07/28/19  2108 07/29/19  0757 07/29/19  1140 07/29/19  1618 07/29/19  2045 07/30/19  0734 07/30/19  1128 07/30/19  1634 07/30/19  2136 07/31/19  0750   POCTGLUCOSE 168* 222* 175* 124* 221* 232* 79 169* 115* 160*       Current Medications and/or Treatments Impacting Glycemic Control  Immunotherapy:    Immunosuppressants     None        Steroids:   Hormones (From admission, onward)    None        Pressors:    Autonomic Drugs (From admission, onward)    None        Hyperglycemia/Diabetes Medications:   Antihyperglycemics (From admission, onward)    Start     Stop Route Frequency Ordered    07/30/19 2100  insulin detemir U-100 pen 41 Units      -- SubQ Nightly 07/30/19 1000    07/30/19 1130  insulin aspart U-100 pen 13 Units      -- SubQ 3 times daily with meals 07/30/19 1000    07/26/19 1509  insulin aspart U-100 pen 3 Units      -- SubQ As needed (PRN) 07/26/19 1410    07/20/19 2250  insulin aspart U-100 pen 0-5 Units      -- SubQ Before meals & nightly PRN 07/20/19 2152    07/20/19 1145  insulin regular (Humulin R) 100 Units in sodium chloride 0.9% 100 mL infusion      07/20 2300 IV Continuous 07/20/19 1035          ASSESSMENT and PLAN    Acute osteomyelitis  Management per primary and Podiatry  On IV cefazolin  Glucose control as above      Diabetic ketoacidosis without coma associated with type 2 diabetes mellitus  - DKA  on admission due to noncompliance of insulin use combined with osteomyelitis of foot  - Now resolved      Type 2 diabetes mellitus with hyperglycemia, with long-term current use of insulin  DKA is now resolved.     Goal BG while inpatient: 140-180 mg/dl  Current BG levels are at goal    Pt's current insulin requirements might be higher due to infection.    Plan:   -Continue Detemir 41 units qHS   -Continue Novolog 13 units with meals   -Continue low dose correction   -Novolog 3 units PRN with snacks overnight  -POC AC/HS    Discharge Recommendations: TBD    Essential hypertension  - manage per primary          Roselyn Chow MD  Endocrinology  [facility]

## 2019-08-01 LAB
BASOPHILS # BLD AUTO: 0.03 K/UL (ref 0–0.2)
BASOPHILS NFR BLD: 0.5 % (ref 0–1.9)
DIFFERENTIAL METHOD: ABNORMAL
EOSINOPHIL # BLD AUTO: 0.1 K/UL (ref 0–0.5)
EOSINOPHIL NFR BLD: 1.8 % (ref 0–8)
ERYTHROCYTE [DISTWIDTH] IN BLOOD BY AUTOMATED COUNT: 13.4 % (ref 11.5–14.5)
HCT VFR BLD AUTO: 32.5 % (ref 40–54)
HGB BLD-MCNC: 9.8 G/DL (ref 14–18)
IMM GRANULOCYTES # BLD AUTO: 0.01 K/UL (ref 0–0.04)
IMM GRANULOCYTES NFR BLD AUTO: 0.2 % (ref 0–0.5)
LYMPHOCYTES # BLD AUTO: 2.3 K/UL (ref 1–4.8)
LYMPHOCYTES NFR BLD: 41.5 % (ref 18–48)
MAGNESIUM SERPL-MCNC: 1.7 MG/DL (ref 1.6–2.6)
MCH RBC QN AUTO: 27.8 PG (ref 27–31)
MCHC RBC AUTO-ENTMCNC: 30.2 G/DL (ref 32–36)
MCV RBC AUTO: 92 FL (ref 82–98)
MONOCYTES # BLD AUTO: 0.4 K/UL (ref 0.3–1)
MONOCYTES NFR BLD: 7.4 % (ref 4–15)
NEUTROPHILS # BLD AUTO: 2.7 K/UL (ref 1.8–7.7)
NEUTROPHILS NFR BLD: 48.6 % (ref 38–73)
NRBC BLD-RTO: 0 /100 WBC
PHOSPHATE SERPL-MCNC: 3.9 MG/DL (ref 2.7–4.5)
PLATELET # BLD AUTO: 218 K/UL (ref 150–350)
PMV BLD AUTO: 10.5 FL (ref 9.2–12.9)
POCT GLUCOSE: 118 MG/DL (ref 70–110)
POCT GLUCOSE: 142 MG/DL (ref 70–110)
POCT GLUCOSE: 250 MG/DL (ref 70–110)
POCT GLUCOSE: 298 MG/DL (ref 70–110)
RBC # BLD AUTO: 3.53 M/UL (ref 4.6–6.2)
WBC # BLD AUTO: 5.56 K/UL (ref 3.9–12.7)

## 2019-08-01 PROCEDURE — 20600001 HC STEP DOWN PRIVATE ROOM

## 2019-08-01 PROCEDURE — 63600175 PHARM REV CODE 636 W HCPCS: Performed by: HOSPITALIST

## 2019-08-01 PROCEDURE — 94761 N-INVAS EAR/PLS OXIMETRY MLT: CPT

## 2019-08-01 PROCEDURE — 63600175 PHARM REV CODE 636 W HCPCS: Performed by: PHYSICIAN ASSISTANT

## 2019-08-01 PROCEDURE — 85025 COMPLETE CBC W/AUTO DIFF WBC: CPT

## 2019-08-01 PROCEDURE — 99232 SBSQ HOSP IP/OBS MODERATE 35: CPT | Mod: ,,, | Performed by: INTERNAL MEDICINE

## 2019-08-01 PROCEDURE — 97530 THERAPEUTIC ACTIVITIES: CPT

## 2019-08-01 PROCEDURE — A4216 STERILE WATER/SALINE, 10 ML: HCPCS | Performed by: HOSPITALIST

## 2019-08-01 PROCEDURE — 25000003 PHARM REV CODE 250: Performed by: HOSPITALIST

## 2019-08-01 PROCEDURE — 83735 ASSAY OF MAGNESIUM: CPT

## 2019-08-01 PROCEDURE — 84100 ASSAY OF PHOSPHORUS: CPT

## 2019-08-01 PROCEDURE — 99232 PR SUBSEQUENT HOSPITAL CARE,LEVL II: ICD-10-PCS | Mod: ,,, | Performed by: INTERNAL MEDICINE

## 2019-08-01 RX ADMIN — GABAPENTIN 300 MG: 300 CAPSULE ORAL at 02:08

## 2019-08-01 RX ADMIN — Medication 10 ML: at 11:08

## 2019-08-01 RX ADMIN — ENOXAPARIN SODIUM 40 MG: 100 INJECTION SUBCUTANEOUS at 04:08

## 2019-08-01 RX ADMIN — INSULIN ASPART 1 UNITS: 100 INJECTION, SOLUTION INTRAVENOUS; SUBCUTANEOUS at 10:08

## 2019-08-01 RX ADMIN — AMLODIPINE BESYLATE 10 MG: 10 TABLET ORAL at 09:08

## 2019-08-01 RX ADMIN — INSULIN ASPART 3 UNITS: 100 INJECTION, SOLUTION INTRAVENOUS; SUBCUTANEOUS at 04:08

## 2019-08-01 RX ADMIN — GABAPENTIN 300 MG: 300 CAPSULE ORAL at 10:08

## 2019-08-01 RX ADMIN — INSULIN ASPART 13 UNITS: 100 INJECTION, SOLUTION INTRAVENOUS; SUBCUTANEOUS at 11:08

## 2019-08-01 RX ADMIN — INSULIN ASPART 13 UNITS: 100 INJECTION, SOLUTION INTRAVENOUS; SUBCUTANEOUS at 07:08

## 2019-08-01 RX ADMIN — GABAPENTIN 300 MG: 300 CAPSULE ORAL at 09:08

## 2019-08-01 RX ADMIN — INSULIN DETEMIR 41 UNITS: 100 INJECTION, SOLUTION SUBCUTANEOUS at 10:08

## 2019-08-01 RX ADMIN — CEFAZOLIN 6 G: 1 INJECTION, POWDER, FOR SOLUTION INTRAMUSCULAR; INTRAVENOUS at 02:08

## 2019-08-01 RX ADMIN — INSULIN ASPART 13 UNITS: 100 INJECTION, SOLUTION INTRAVENOUS; SUBCUTANEOUS at 04:08

## 2019-08-01 RX ADMIN — ATORVASTATIN CALCIUM 80 MG: 20 TABLET, FILM COATED ORAL at 09:08

## 2019-08-01 NOTE — PLAN OF CARE
Type 2 DM  Reviewed BG levels and insulin regimen.     Goal BG while inpatient: 140-180 mg/dl  Current BG levels are at goal    Recommendations:   Continue current insulin regimen    Discharge Recommendations: TBD

## 2019-08-01 NOTE — PLAN OF CARE
Problem: Adult Inpatient Plan of Care  Goal: Plan of Care Review  Outcome: Ongoing (interventions implemented as appropriate)  Pt AAO x 4 & up in chair throughout night. Pt denies any pain. HS . Pt remains free from falls. Call bell in reach. Wound vac intact to left foot. Pt would like to be discharged w/ home health soon. Pt w/out any questions or concerns. Instructed to call w/ any needs.    08/01/19 0434   Plan of Care Review   Plan of Care Reviewed With patient       Problem: Diabetes Comorbidity  Goal: Blood Glucose Level Within Desired Range    Intervention: Maintain Glycemic Control     08/01/19 0434   Monitor and Manage Ketoacidosis   Glycemic Management blood glucose monitoring         Problem: Fall Injury Risk  Goal: Absence of Fall and Fall-Related Injury    Intervention: Identify and Manage Contributors to Fall Injury Risk     08/01/19 0434   Manage Acute Allergic Reaction   Medication Review/Management medications reviewed   Identify and Manage Contributors to Fall Injury Risk   Self-Care Promotion independence encouraged;BADL personal objects within reach

## 2019-08-01 NOTE — PROGRESS NOTES
Progress Note  Hospital Medicine    Admit Date: 7/12/2019  Length of Stay:  LOS: 20 days     SUBJECTIVE:         Follow-up For:  Bacteremia due to group B Streptococcus    HPI/Interval history (See H&P for complete P,F,SHx) :   Over view  Indio Ryder Jr. is a 49 y.o. male with a PMH of T2DM (poorly  controlled, with long term insulin use) and HTN who presented to the ED on 7/12/2019 diagnosed with  Vomiting (vomiting for past 4 days, denies sick contacts)   Oriented x3 accompanied by daughter at the bedside.  Mentions that he wears lower extremity stockings for edema. Reportedly formed a new ulcer on the left lateral aspect of the foot about 10 days back.  had 4-5 days of nausea and vomiting. Vomiting is non-bloody, No abdominal pain..  It occurs immediately after eating and he has not been able to keep anything down. Associated with fevers and chills but no objective temperature measurement.  At baseline only takes insulin once a day stop taking more than a week because of nausea and vomiting and poor oral intake.  Does not check fingersticks regularly at home. recently lost his insurance and has been unable to follow up with wound care.  His last follow-up with primary care provider and podiatrist was about 2 years.  He has been managing his right foot ulcer at home with dressing but over the past 2 weeks an ulcer on his left foot has developed. He denies any pain or injury- has chronic numbness of bilateral lower extremities and hands from diabetic neuropathy.  Found to be in DKA in the emergency department with beta hydroxybutyrate elevated at 4.1.  Started on insulin drip    07/13/2019   Temperature of 101.5 last night.  Anion gap resolved- bicarbonate 23, insulin drip discontinued.  Started on Levemir 23 units q.a.m. 7 units as part t.i.d. with meals with low-dose sliding scale.  Diabetic diet and NPO from midnight for possible intervention.  MRI of the foot- Ulceration involving the lateral plantar  aspect of the distal left foot, with findings concerning for exposed bone involving the distal aspect of the 5th metatarsal and proximal phalange of the 5th toe.  There are associated changes concerning for osteomyelitis involving the distal aspect of the 5th metatarsal and entirety of the right toe.  There is osseous hyperemia involving the proximal phalange of the 2nd toe, early changes of osteomyelitis are a consideration . aerobic culture  and blood culture with Group B strep. discontinued vancomycin       07/14/2019  Had urinary retention of 800 mL but spontaneously voided.  Started on transitional insulin drip as NPO from midnight for OR today.  Hypokalemia repeat.  Blood cultures daily until clear.  Aerobic cultures with group B Streptococcus and Staph aureus.  Added vancomycin ( monitor for toxicity)    07/15/2019   s/p I&D and 5th ray amputation in the OR  on  07/14/2019.  ALISSA ordered to evaluate vascular status intraoperative Cultures pending . clean margin cultures and pathology pending. Deescalated from vanc and zosyn to cefazolin 6 gram IV cont infusion.  blood cultures from 07/13/2019 no growth.  Continues to have nausea vomiting since unable to tolerate anything by mouth.  Started on IV hydration and Reglan    07/23/2019  underwent repeat I&D and left partial 5th ray amputation 7/17.   Per OP note,  purulent drainage was expressed proximal to distal from the level of the plantar aspect of the left calcaneus to the open wound on the plantar midfoot. Purulent drainage was also manually expressed from the plantar distal aspect of the forefoot. Cultures of this were sent.   A small portion of the distal metatarsal was reported as sent as a clean margin to Micro and pathology.  The wound was partially closed, Neox allograft and wound Vac placed.  Repeat blood cultures remain NGTD.   ABIs without significant PAD.   Tolerating oral diet without nausea and vomiting.  Status post PICC line placement on  07/23/2019.  Wound VAC in place to left foot.  PT evaluation, crutches ordered per Podiatry.  No weight-bearing to left foot    7/27/19  Insurance denied rehab placement.  s/p  peer to peer. ID recommending  Cefazolin 6 gram IV continuous infusion q 24 hours for 6 weeks from date of surgery as unclear if residual osteomyelitis is present without clean margin culture and pathology results (end date 8/28/19). will qualify for SNF per insurance     Interval history   . started on gabapentin for neuropathic pain in  hands    Review of Systems: List if applicable  Constitutional: Positive for activity change, appetite change (Poor appetite for the last and), chills, fatigue, fever ( weight loss) and unexpected weight change.   HENT: Negative for congestion, ear discharge, ear pain, facial swelling and sore throat.    Eyes: Negative for pain, discharge and itching.   Respiratory: Positive for shortness of breath ( at rest and exertion ). Negative for cough, chest tightness and wheezing.    Cardiovascular: Positive for leg swelling ( chronic). Negative for chest pain and palpitations.   Gastrointestinal: Positive for vomiting improved. Negative for abdominal distention, abdominal pain, blood in stool, constipation and diarrhea.   Endocrine: Negative for cold intolerance.   Genitourinary: Negative for dysuria, hematuria and urgency.   Musculoskeletal: Negative for arthralgias, gait problem, myalgias, neck pain and neck stiffness.   Skin: Negative for color change, pallor and rash.   Allergic/Immunologic: Negative for environmental allergies.   Neurological: Positive for weakness and numbness ( bilateral lower extremities and hands attributes to diabetic neuropathy). Negative for dizziness, syncope, light-headedness and headaches.   Hematological: Negative for adenopathy.   Psychiatric/Behavioral: Negative for agitation, behavioral problems and confusion.     OBJECTIVE:     Vital Signs Range (Last 24H):  Temp:  [96.3 °F  (35.7 °C)-98.7 °F (37.1 °C)]   Pulse:  []   Resp:  [16-20]   BP: (105-132)/(63-84)   SpO2:  [97 %-98 %]     Physical Exam:  Constitutional: He is oriented to person, place, and time. He appears well-developed and well-nourished.   HENT:   Head: Normocephalic and atraumatic.   Mouth/Throat: Oropharynx is clear and moist. No oropharyngeal exudate.   Eyes: Pupils are equal, round, and reactive to light. Conjunctivae and EOM are normal. Right eye exhibits no discharge. Left eye exhibits no discharge. No scleral icterus.   Neck: Normal range of motion. Neck supple. No JVD present. No tracheal deviation present. No thyromegaly present.   Cardiovascular: Normal rate, regular rhythm and normal heart sounds. Exam reveals no gallop and no friction rub.   No murmur heard.  Pulmonary/Chest: Effort normal and breath sounds normal. No stridor. No respiratory distress. He has no wheezes. He has no rales.   Abdominal: Soft. Bowel sounds are normal. He exhibits no distension and no mass. There is no tenderness. There is no guarding.   Musculoskeletal: Normal range of motion. He exhibits edema.   Lymphadenopathy:     He has no cervical adenopathy.   Neurological: He is oriented to person, place, and time. No cranial nerve deficit.   Able to move upper and lower extremities without limitation   Skin: Skin is warm and dry.   Left foot:  With wound VAC in place.     Right foot: 1.5cm clean based ulcer at base of right 5th MTP joint. Right great toe and second toe surgically amputated.     Psychiatric: He has a normal mood and affect. His behavior is normal.   Nursing note and vitals reviewed    Medications:  Medication list was reviewed and changes noted under Assessment/Plan.      Current Facility-Administered Medications:     acetaminophen tablet 650 mg, 650 mg, Oral, Q6H PRN, Vicente Wick MD, 650 mg at 07/31/19 1450    amLODIPine tablet 10 mg, 10 mg, Oral, Daily, Vicente Wick MD, 10 mg at 08/01/19 8599     atorvastatin tablet 80 mg, 80 mg, Oral, Daily, Vicente Wick MD, 80 mg at 08/01/19 0928    calcium carbonate 200 mg calcium (500 mg) chewable tablet 1,000 mg, 1,000 mg, Oral, BID PRN, Antonio Tate PA-C, 1,000 mg at 07/25/19 1601    ceFAZolin (ANCEF) 6 g in dextrose 5 % 500 mL CONTINUOUS INFUSION, 6 g, Intravenous, Q24H, RAMILA Kinsey, 6 g at 08/01/19 1423    dextrose 10% (D10W) Bolus, 12.5 g, Intravenous, PRN, Nakul Patel MD    dextrose 10% (D10W) Bolus, 25 g, Intravenous, PRN, Nakul Patel MD    enoxaparin injection 40 mg, 40 mg, Subcutaneous, Daily, Vicente Wick MD, 40 mg at 07/31/19 1651    [COMPLETED] gabapentin capsule 300 mg, 300 mg, Oral, Once, 300 mg at 07/28/19 1500 **FOLLOWED BY** [COMPLETED] gabapentin capsule 300 mg, 300 mg, Oral, BID, 300 mg at 07/29/19 2046 **FOLLOWED BY** gabapentin capsule 300 mg, 300 mg, Oral, TID, Vicente Wick MD, 300 mg at 08/01/19 1423    glucagon (human recombinant) injection 1 mg, 1 mg, Intramuscular, PRN, Nakul Patel MD    glucose chewable tablet 16 g, 16 g, Oral, PRN, Nakul Patel MD    glucose chewable tablet 24 g, 24 g, Oral, PRN, Nakul Patel MD    hydrALAZINE tablet 25 mg, 25 mg, Oral, Q8H PRN, Vicente Wick MD, 25 mg at 07/23/19 0804    insulin aspart U-100 pen 0-5 Units, 0-5 Units, Subcutaneous, QID (AC + HS) PRN, Nakul Patel MD, 2 Units at 07/30/19 0755    insulin aspart U-100 pen 13 Units, 13 Units, Subcutaneous, TIDWM, Roselyn Chow MD, 13 Units at 08/01/19 1141    insulin aspart U-100 pen 3 Units, 3 Units, Subcutaneous, PRN, Javid Saini MD    insulin detemir U-100 pen 41 Units, 41 Units, Subcutaneous, QHS, Roselyn Chow MD, 41 Units at 07/31/19 2117    ondansetron injection 8 mg, 8 mg, Intravenous, Q8H PRN, Zeynep Duke PA-C    promethazine (PHENERGAN) 12.5 mg in dextrose 5 % 50 mL IVPB, 12.5 mg, Intravenous, Q6H PRN, Zeynep Duke PA-C, Last Rate: 150 mL/hr at 07/18/19  0012, 12.5 mg at 07/18/19 0012    sodium chloride 0.9% flush 10 mL, 10 mL, Intravenous, PRN, Vicente Wick MD    sodium chloride 0.9% flush 10 mL, 10 mL, Intravenous, PRN, Mariela Damon MD    Flushing PICC Protocol, , , Until Discontinued **AND** sodium chloride 0.9% flush 10 mL, 10 mL, Intravenous, Q6H, 10 mL at 08/01/19 1142 **AND** sodium chloride 0.9% flush 10 mL, 10 mL, Intravenous, PRN, Vicente Wick MD    acetaminophen, calcium carbonate, Dextrose 10% Bolus, Dextrose 10% Bolus, glucagon (human recombinant), glucose, glucose, hydrALAZINE, insulin aspart U-100, insulin aspart U-100, ondansetron, promethazine (PHENERGAN) IVPB, sodium chloride 0.9%, sodium chloride 0.9%, Flushing PICC Protocol **AND** sodium chloride 0.9% **AND** sodium chloride 0.9%    Laboratory/Diagnostic Data:  Reviewed and noted in plan where applicable- Please see chart for full lab data.    Recent Labs   Lab 07/30/19  0510 07/31/19  0513 08/01/19  0401   WBC 5.61 5.38 5.56   HGB 10.1* 9.5* 9.8*   HCT 33.8* 31.3* 32.5*    221 218       Recent Labs   Lab 07/27/19  2302 07/28/19  0519 07/29/19  0530 07/30/19  0510 07/31/19  0513 08/01/19  0401    139 137  --   --   --    K 4.0 4.4 4.2  --   --   --    CL 96 98 96  --   --   --    CO2 33* 30* 32*  --   --   --    BUN 14 13 19  --   --   --    CREATININE 0.8 0.9 1.1  --   --   --    * 251* 266*  --   --   --    CALCIUM 9.6 9.8 9.5  --   --   --    MG  --  1.6 1.6 1.6 1.6 1.7   PHOS  --  4.2 3.7 3.1 3.2 3.9       Recent Labs   Lab 07/27/19  2302 07/28/19  0519 07/29/19  0530   ALKPHOS 154* 155* 151*   ALT 9* 10 9*   AST 29 29 17   ALBUMIN 2.5* 2.5* 2.6*   PROT 7.2 7.3 7.5   BILITOT 0.2 0.2 0.2        Microbiology labs for the last week  Microbiology Results (last 7 days)     Procedure Component Value Units Date/Time    Culture, Anaerobe [[id number]] Collected:  07/17/19 1214    Order Status:  Completed Specimen:  Wound from Foot, Left Updated:  07/26/19 1316      Anaerobic Culture No anaerobes isolated    Narrative:       Left foot wound           Imaging Results          MRI Foot (Forefoot) Left Without Contrast (Final result)  Result time 07/12/19 19:07:38    Final result by Bob Oglesby MD (07/12/19 19:07:38)                 Impression:      Ulceration involving the lateral plantar aspect of the distal left foot, with findings concerning for exposed bone involving the distal aspect of the 5th metatarsal and proximal phalange of the 5th toe.  There are associated changes concerning for osteomyelitis involving the distal aspect of the 5th metatarsal and entirety of the right toe.  There is osseous hyperemia involving the proximal phalange of the 2nd toe, early changes of osteomyelitis are a consideration no definite low signal on T1 at this time.      Electronically signed by: Bob Oglesby MD  Date:    07/12/2019  Time:    19:07             Narrative:    EXAMINATION:  MRI FOOT (FOREFOOT) LEFT WITHOUT CONTRAST    CLINICAL HISTORY:  Open wound of ankle, foot and toes;    TECHNIQUE:  Multiplanar multisequence MRI images of the left forefoot were obtained without administration of IV contrast.    COMPARISON:  Radiograph 07/12/2019    FINDINGS:  There is soft tissue ulceration involving the lateral plantar aspect of the right foot, noting there appears to be exposed bone in the location, likely involving the distal aspect of the 5th metatarsal head, and proximal phalange of the 5th toe. There is diffusely increased STIR signal within the distal aspect of the 5th metatarsal, and throughout the phalanges of the 5th toe. There is corresponding low T1 signal involving the same regions, concerning for osteomyelitis. There is additional abnormal STIR signal within the base of the proximal phalange of the 2nd toe, without convincing low signal on T1, suggesting osseous hyperemia although early changes of osteomyelitis remain a consideration. There is edema and induration  about the open wound, no convincing focal organized fluid collection.  The remainder of the osseous structures are grossly unremarkable. No significant joint effusions.  There is reactive edema about the intrinsic musculature of the foot, as well as along the dorsal surface.                                X-Ray Foot Complete Left (Final result)  Result time 07/12/19 11:06:16    Final result by Chandan Montana MD (07/12/19 11:06:16)                 Impression:      Abnormal examination with findings strongly suggesting osteomyelitis involving the base of the proximal phalanx of the left 5th toe and likely the head of the 5th metatarsal as well.    This report was flagged in Epic as abnormal.      Electronically signed by: Chandan Montana MD  Date:    07/12/2019  Time:    11:06             Narrative:    EXAMINATION:  XR FOOT COMPLETE 3 VIEW LEFT    TECHNIQUE:  Three views of the left foot were obtained, with AP, lateral, and oblique projections submitted.    COMPARISON:  No relevant comparison examinations are currently available.    FINDINGS:  Focal soft tissue loss/irregularity involving the region of the left 5th MTP joint is observed, with gas present within the soft tissues at this level, the findings likely related to a clinically evident ulceration.  There is focal lytic bone destruction involving the base of the proximal phalanx of the 5th toe, and possibly involving the head of the 5th metatarsal as well, this radiographic appearance strongly suggesting osteomyelitis.  The remaining osseous structures appear intact, with no evidence of recent fracture and no additional areas of focal lytic bone destruction noted.  Some arterial vascular calcification in the soft tissues about the ankle is incidentally observed.                               X-Ray Chest AP Portable (Final result)  Result time 07/12/19 10:48:19    Final result by Chandan Montana MD (07/12/19 10:48:19)                 Impression:      No significant  "intrathoracic abnormality.  Allowing for differences in projection and a poorer inspiratory depth level on the current examination, there has been no significant detrimental interval change in the appearance of the chest since 05/03/2016.      Electronically signed by: Chandan Montana MD  Date:    07/12/2019  Time:    10:48             Narrative:    EXAMINATION:  XR CHEST AP PORTABLE    CLINICAL HISTORY:  hyperglycemia;    COMPARISON:  Comparison is made to the most recent prior chest radiograph, dated 05/03/2016.    FINDINGS:  Heart size is normal, as is the appearance of the pulmonary vascularity.  Lung zones are clear, and free of significant airspace consolidation or volume loss.  No pleural fluid.  No abnormal mediastinal widening.  No pneumothorax.  Incidental note is made of some spurring at the right acromioclavicular joint level.                                Estimated body mass index is 25.77 kg/m² as calculated from the following:    Height as of this encounter: 6' 1" (1.854 m).    Weight as of this encounter: 88.6 kg (195 lb 5.2 oz).    I & O (Last 24H):    Intake/Output Summary (Last 24 hours) at 8/1/2019 1552  Last data filed at 8/1/2019 0600  Gross per 24 hour   Intake 948.8 ml   Output 1725 ml   Net -776.2 ml       Estimated Creatinine Clearance: 89.8 mL/min (based on SCr of 1.1 mg/dL).    ASSESSMENT/PLAN:     Active Problems:    Active Diagnoses:     Diagnosis Date Noted POA    PRINCIPAL PROBLEM:  Acute osteomyelitis [M86.10] x-ray left foot - strongly suggesting osteomyelitis involving the base of the proximal phalanx of the left 5th toe and likely the head of the 5th metatarsal as well.        recently lost his insurance and has been unable to follow up with wound care.  His last follow-up with primary care provider and podiatrist was about 2 years.  He has been managing his right foot ulcer at home with dressing but over the past 2 weeks an ulcer on his left foot has developed. He denies any pain or " injury- has chronic numbness of bilateral lower extremities and hands from diabetic neuropathy.  Started on IV Zosyn and vanc.  Podiatry and Infectious Disease consult.  aerobic culture with Group B strep.  Add vancomycin for Staph aureus  (monitor for toxicity)    07/14/2019   s/p I&D and 5th ray amputation in the OR  on  07/14/2019.  No weight-bearing left lower x-ray ALISSA ordered to evaluate vascular status intraoperative Cultures pending . clean margin cultures and pathology pending. Deescalated from vanc and zosyn to cefazolin 6 gram IV cont infusion.  blood cultures from 07/13/2019 no growth.  Continues to have nausea vomiting since unable to tolerate anything by mouth.  Started on IV hydration and Reglan    07/23/19  underwent repeat I&D and left partial 5th ray amputation 7/17.   Per OP note,  purulent drainage was expressed proximal to distal from the level of the plantar aspect of the left calcaneus to the open wound on the plantar midfoot. Purulent drainage was also manually expressed from the plantar distal aspect of the forefoot. Cultures of this were sent.   A small portion of the distal metatarsal was reported as sent as a clean margin to Micro and pathology.  The wound was partially closed, Neox allograft and wound Vac placed.  Repeat blood cultures remain NGTD.   ABIs without significant PAD.   Tolerating oral diet without nausea and vomiting.  Status post PICC line placement on 07/23/2019.  Wound VAC in place to left foot.  PT evaluation, crutches ordered per Podiatry.  No weight-bearing to left foot .     7/27/19  Insurance denied rehab placement.  s/p  peer to peer. ID recommending  Cefazolin 6 gram IV continuous infusion q 24 hours for 6 weeks from date of surgery as unclear if residual osteomyelitis is present without clean margin culture and pathology results (end date 8/28/19). will qualify for SNF per insurance    07/12/2019 Yes    Sepsis [A41.9] /bacteremia - blood cultures from 07/12/2019  group B Streptococcus. secondary to diabetic foot ulcer.  Elevated ESR greater than 120 As above 07/12/2019 Yes    Leukocytosis [D72.829] 14 secondary to sepsis. resolved  07/12/2019 Unknown    Anemia [D64.9] hemoglobin at 10.4, stable, normocytic.  Monitor 07/12/2019 Unknown    Type 2 diabetes mellitus with left eye affected by mild nonproliferative retinopathy without macular edema, without long-term current use of insulin [E11.3292]At baseline only takes insulin once a day stopped  taking more than a week because of nausea and vomiting and poor oral intake.  Does not check fingersticks regularly at home. recently lost his insurance.  Obtain HbA1c elevated greater than 14.   on Levemir 28 units nightly and NovoLog 11 units t.i.d. with meals 08/11/2017 Yes       Chronic    Diabetic ketoacidosis without coma associated with type 2 diabetes mellitus [E11.10]  Found to be in DKA in the emergency department with beta hydroxybutyrate elevated at 4.1.  Started on insulin drip.  Received normal saline in the emergency department    07/13/2019  Anion gap resolved- bicarbonate 23, insulin drip discontinued.        hypomagnesemia replaced    Hypophosphatemia- placed   05/30/2017 Unknown    Type 2 diabetes mellitus with diabetic neuropathy, with long-term current use of insulin [E11.40, Z79.4] .  Blood glucose uncontrolled continue with Levemir 37 units q.h.s. and NovoLog 12 units with meals and low-dose correction scale. started on gabapentin for neuropathic pain in  hands (7/28/19) 05/03/2016 Not Applicable       Chronic    Mixed hyperlipidemia [E78.2] continue with Lipitor 08/12/2014 Yes    Essential hypertension [I10]  amlodipine dose increased to 10 mg daily.  06/06/2013 Yes       Chronic               Disposition- SNF    DVT prophylaxis addressed with:  Brian Rust M.D.

## 2019-08-01 NOTE — PLAN OF CARE
08/01/19 1035   Post-Acute Status   Post-Acute Authorization Placement   Post-Acute Placement Status Pending Payor Review     Patient expected to discharge to [facility] pending insurance AUTH at this time.    Josefina Hammond LMSW  [facility]   [id number]

## 2019-08-01 NOTE — PLAN OF CARE
Problem: Adult Inpatient Plan of Care  Goal: Plan of Care Review  Outcome: Ongoing (interventions implemented as appropriate)  POC reviewed with pt. VSS. Up in chair. Accuchecks per order. SSI administered. Wound vac in place to L foot. No acute changes. Will continue to monitor.

## 2019-08-01 NOTE — PLAN OF CARE
Problem: Occupational Therapy Goal  Goal: Occupational Therapy Goal  Goals to be met by: 8/3/19    Patient will increase functional independence with ADLs by performing:    UE Dressing with Supervision.  LE Dressing with Stand-by Assistance.  Grooming while standing at sink with Stand-by Assistance.  Toileting from toilet with Stand-by Assistance for hygiene and clothing management.   Supine to sit with [place]. MET 7/26  Toilet transfer to toilet with Stand-by Assistance.      Outcome: Ongoing (interventions implemented as appropriate)  Continue OT POC.  Keke Marc OT  8/1/2019

## 2019-08-01 NOTE — PT/OT/SLP PROGRESS
Occupational Therapy   Treatment    Name: Indio Ryder Jr.  MRN: [id number]  Admitting Diagnosis:  Bacteremia due to group B Streptococcus  15 Days Post-Op    Recommendations:     Discharge Recommendations: rehabilitation facility  Discharge Equipment Recommendations:  shower chair, walker, rolling  Barriers to discharge:  None    Assessment:     Indio Ryder Jr. is a 49 y.o. male with a medical diagnosis of Bacteremia due to group B Streptococcus.  He presents with impaired ADL and functional mobility. Pt demonstrated supervision with bed mobility and SBA with transfer to bedside chair. Performance deficits affecting function are impaired endurance, weakness, impaired self care skills, gait instability, impaired balance, impaired skin, edema, decreased safety awareness. Pt would benefit from continued OT skilled services 3x/wk to improve daily living skills to optimize QOL. Pt is recommended to discharge to rehab at this time.      Rehab Prognosis:  Good; patient would benefit from acute skilled OT services to address these deficits and reach maximum level of function.       Plan:     Patient to be seen 3 x/week to address the above listed problems via self-care/home management, therapeutic activities, therapeutic exercises, neuromuscular re-education  · Plan of Care Expires: 08/22/19  · Plan of Care Reviewed with: patient    Subjective     Pain/Comfort:  · Pain Rating 1: 0/10  · Pain Rating Post-Intervention 1: 0/10  · Pain Rating Post-Intervention 2: 0/10    Objective:     Communicated with: RN prior to session.  Patient found HOB elevated with telemetry, wound vac, PICC line upon OT entry to room.    General Precautions: Standard, fall   Orthopedic Precautions:LLE non weight bearing   Braces: N/A     Occupational Performance:     Bed Mobility:    · Patient completed Rolling/Turning to Left with  stand by assistance  · Patient completed Scooting/Bridging with stand by assistance  · Patient completed Supine  to Sit with stand by assistance     Functional Mobility/Transfers:  · Patient completed Sit <> Stand Transfer with stand by assistance  with  no assistive device   · Patient completed Bed <> Chair Transfer using Step Transfer technique with stand by assistance with no assistive device  · Functional Mobility: Pt completed transfer to bedside chair      [facility] 6 Click ADL: 21    Treatment & Education:  .Pt educated on role of occupational therapy, POC, and safety during ADLs and functional mobility. Pt and OT discussed importance of safe, continued mobility to optimize daily living skills. Pt verbalized understanding.   Pt completed the following during session: bed mobility, sit>stand t/f, ambulation to bedside chair.  White board updated during session. Pt given instruction to call for medical staff/nurse for assistance.       Patient left up in chair with all lines intact and call button in reachEducation:      GOALS:   Multidisciplinary Problems     Occupational Therapy Goals        Problem: Occupational Therapy Goal    Goal Priority Disciplines Outcome Interventions   Occupational Therapy Goal     OT, PT/OT Ongoing (interventions implemented as appropriate)    Description:  Goals to be met by: 8/3/19    Patient will increase functional independence with ADLs by performing:    UE Dressing with Supervision.  LE Dressing with Stand-by Assistance.  Grooming while standing at sink with Stand-by Assistance.  Toileting from toilet with Stand-by Assistance for hygiene and clothing management.   Supine to sit with [place]. MET 7/26  Toilet transfer to toilet with Stand-by Assistance.                       Time Tracking:     OT Date of Treatment: 08/01/19  OT Start Time: 0908  OT Stop Time: 0925  OT Total Time (min): 17 min    Billable Minutes:Therapeutic Activity 17 min    Keke Marc OT  8/1/2019

## 2019-08-02 VITALS
DIASTOLIC BLOOD PRESSURE: 83 MMHG | HEIGHT: 73 IN | HEART RATE: 100 BPM | OXYGEN SATURATION: 100 % | RESPIRATION RATE: 18 BRPM | WEIGHT: 198.63 LBS | TEMPERATURE: 98 F | BODY MASS INDEX: 26.33 KG/M2 | SYSTOLIC BLOOD PRESSURE: 128 MMHG

## 2019-08-02 PROBLEM — E11.621 DIABETIC ULCER OF LEFT MIDFOOT ASSOCIATED WITH TYPE 2 DIABETES MELLITUS, WITH BONE INVOLVEMENT WITHOUT EVIDENCE OF NECROSIS: Status: ACTIVE | Noted: 2019-08-02

## 2019-08-02 PROBLEM — L97.426 DIABETIC ULCER OF LEFT MIDFOOT ASSOCIATED WITH TYPE 2 DIABETES MELLITUS, WITH BONE INVOLVEMENT WITHOUT EVIDENCE OF NECROSIS: Status: ACTIVE | Noted: 2019-08-02

## 2019-08-02 LAB
BASOPHILS # BLD AUTO: 0.03 K/UL (ref 0–0.2)
BASOPHILS NFR BLD: 0.6 % (ref 0–1.9)
DIFFERENTIAL METHOD: ABNORMAL
EOSINOPHIL # BLD AUTO: 0.1 K/UL (ref 0–0.5)
EOSINOPHIL NFR BLD: 1.2 % (ref 0–8)
ERYTHROCYTE [DISTWIDTH] IN BLOOD BY AUTOMATED COUNT: 13.3 % (ref 11.5–14.5)
HCT VFR BLD AUTO: 30.4 % (ref 40–54)
HGB BLD-MCNC: 9.4 G/DL (ref 14–18)
IMM GRANULOCYTES # BLD AUTO: 0.01 K/UL (ref 0–0.04)
IMM GRANULOCYTES NFR BLD AUTO: 0.2 % (ref 0–0.5)
LYMPHOCYTES # BLD AUTO: 2.2 K/UL (ref 1–4.8)
LYMPHOCYTES NFR BLD: 43.8 % (ref 18–48)
MAGNESIUM SERPL-MCNC: 1.5 MG/DL (ref 1.6–2.6)
MCH RBC QN AUTO: 28.1 PG (ref 27–31)
MCHC RBC AUTO-ENTMCNC: 30.9 G/DL (ref 32–36)
MCV RBC AUTO: 91 FL (ref 82–98)
MONOCYTES # BLD AUTO: 0.4 K/UL (ref 0.3–1)
MONOCYTES NFR BLD: 7.6 % (ref 4–15)
NEUTROPHILS # BLD AUTO: 2.4 K/UL (ref 1.8–7.7)
NEUTROPHILS NFR BLD: 46.6 % (ref 38–73)
NRBC BLD-RTO: 0 /100 WBC
PHOSPHATE SERPL-MCNC: 3.4 MG/DL (ref 2.7–4.5)
PLATELET # BLD AUTO: 218 K/UL (ref 150–350)
PMV BLD AUTO: 10.7 FL (ref 9.2–12.9)
POCT GLUCOSE: 120 MG/DL (ref 70–110)
POCT GLUCOSE: 198 MG/DL (ref 70–110)
POCT GLUCOSE: 233 MG/DL (ref 70–110)
RBC # BLD AUTO: 3.35 M/UL (ref 4.6–6.2)
WBC # BLD AUTO: 5.12 K/UL (ref 3.9–12.7)

## 2019-08-02 PROCEDURE — 84100 ASSAY OF PHOSPHORUS: CPT

## 2019-08-02 PROCEDURE — 83735 ASSAY OF MAGNESIUM: CPT

## 2019-08-02 PROCEDURE — 63600175 PHARM REV CODE 636 W HCPCS: Performed by: PHYSICIAN ASSISTANT

## 2019-08-02 PROCEDURE — 99233 PR SUBSEQUENT HOSPITAL CARE,LEVL III: ICD-10-PCS | Mod: 24,,, | Performed by: PODIATRIST

## 2019-08-02 PROCEDURE — 99232 PR SUBSEQUENT HOSPITAL CARE,LEVL II: ICD-10-PCS | Mod: ,,, | Performed by: INTERNAL MEDICINE

## 2019-08-02 PROCEDURE — 85025 COMPLETE CBC W/AUTO DIFF WBC: CPT

## 2019-08-02 PROCEDURE — 25000003 PHARM REV CODE 250: Performed by: HOSPITALIST

## 2019-08-02 PROCEDURE — 99238 HOSP IP/OBS DSCHRG MGMT 30/<: CPT | Mod: ,,, | Performed by: INTERNAL MEDICINE

## 2019-08-02 PROCEDURE — 99232 SBSQ HOSP IP/OBS MODERATE 35: CPT | Mod: ,,, | Performed by: INTERNAL MEDICINE

## 2019-08-02 PROCEDURE — 63600175 PHARM REV CODE 636 W HCPCS: Performed by: STUDENT IN AN ORGANIZED HEALTH CARE EDUCATION/TRAINING PROGRAM

## 2019-08-02 PROCEDURE — 94761 N-INVAS EAR/PLS OXIMETRY MLT: CPT

## 2019-08-02 PROCEDURE — 99238 PR HOSPITAL DISCHARGE DAY,<30 MIN: ICD-10-PCS | Mod: ,,, | Performed by: INTERNAL MEDICINE

## 2019-08-02 PROCEDURE — A4216 STERILE WATER/SALINE, 10 ML: HCPCS | Performed by: HOSPITALIST

## 2019-08-02 PROCEDURE — 99233 SBSQ HOSP IP/OBS HIGH 50: CPT | Mod: 24,,, | Performed by: PODIATRIST

## 2019-08-02 RX ORDER — INSULIN ASPART 100 [IU]/ML
13 INJECTION, SOLUTION INTRAVENOUS; SUBCUTANEOUS 3 TIMES DAILY
Qty: 36 ML | Refills: 3 | Status: SHIPPED | OUTPATIENT
Start: 2019-08-02 | End: 2019-09-12

## 2019-08-02 RX ADMIN — CEFAZOLIN 6 G: 1 INJECTION, POWDER, FOR SOLUTION INTRAMUSCULAR; INTRAVENOUS at 12:08

## 2019-08-02 RX ADMIN — AMLODIPINE BESYLATE 10 MG: 10 TABLET ORAL at 10:08

## 2019-08-02 RX ADMIN — INSULIN ASPART 13 UNITS: 100 INJECTION, SOLUTION INTRAVENOUS; SUBCUTANEOUS at 07:08

## 2019-08-02 RX ADMIN — GABAPENTIN 300 MG: 300 CAPSULE ORAL at 10:08

## 2019-08-02 RX ADMIN — INSULIN ASPART 2 UNITS: 100 INJECTION, SOLUTION INTRAVENOUS; SUBCUTANEOUS at 07:08

## 2019-08-02 RX ADMIN — Medication 10 ML: at 12:08

## 2019-08-02 RX ADMIN — ATORVASTATIN CALCIUM 80 MG: 20 TABLET, FILM COATED ORAL at 10:08

## 2019-08-02 RX ADMIN — GABAPENTIN 300 MG: 300 CAPSULE ORAL at 04:08

## 2019-08-02 RX ADMIN — INSULIN ASPART 13 UNITS: 100 INJECTION, SOLUTION INTRAVENOUS; SUBCUTANEOUS at 12:08

## 2019-08-02 RX ADMIN — Medication 10 ML: at 06:08

## 2019-08-02 NOTE — PT/OT/SLP PROGRESS
Physical Therapy      Indio Ryder Jr.   MRN: [id number]     Pt not seen as pt with podiatry. Pt with potential d/c on this date will follow as appropriate.

## 2019-08-02 NOTE — PLAN OF CARE
to the bedside at this time to see patient.  The patient is aware the  team continues to follow throughout this admission for discharge needs and/or recommendations.   name and number remains on the board at the bedside for the patient or the family to call with discharge needs or questions.  Understanding verbalized.     08/02/19 1144   Discharge Reassessment   Assessment Type Discharge Planning Reassessment   Provided patient/caregiver education on the expected discharge date and the discharge plan Yes   Do you have any problems affording any of your prescribed medications? No   Discharge Plan A Skilled Nursing Facility   Discharge Plan B Home with family  (Home infusions and outpatient wound clinic.)   DME Needed Upon Discharge    (TBD)   Patient choice form signed by patient/caregiver Yes   Anticipated Discharge Disposition SNF   Can the patient answer the patient profile reliably? Yes, cognitively intact   How does the patient rate their overall health at the present time? Fair

## 2019-08-02 NOTE — PROGRESS NOTES
"[facility]  Endocrinology  Progress Note    Admit Date: 2019     Reason for Consult: Management of T2DM, Hyperglycemia, DKA    Surgical Procedure and Date: 19    Diabetes diagnosis year: >20 years, 1990s?    Home Diabetes Medications:  Metformin 500mg BID, Lantus 26U QHS    How often checking glucose at home? None  BG readings on regimen: n/a  Hypoglycemia on the regimen?  No  Missed doses on regimen?  No    Diabetes Complications include:     Hyperglycemia and Foot ulcer      Complicating diabetes co morbidities:  HTN      HPI:   Patient is a 49 y.o. male with a diagnosis of Type 2 DM (noncomplaince, skip Levemir 1-2x a week), HTN, PVD, hx of right foot osteomyelitis who was admitted to hospital medicine for DKA and L foot ulcer. He report  4-5 days of nausea and vomiting. Vomiting is non-bloody. He has not been able to keep anything down. Pt hasn't been administering his insulin during this time due to the fact that he has not been able to keep anything down. He was found to have a large open wound on L foot, pt recently lost his insurance and has been unable to follow up with wound care. Xray showed new Osteomyelitis of L foot.  Pt reported skipping his Lantus at home 1-2x a week, not on insulins with meal. Was on Trulicity but stopped due to lost of insurance. BHB was 4.1, A1C >14, Anion gap of 21, glucose 349. Endocrine was consulted for DKA, diabetes management.     Interval HPI:   Overnight events:  VS stable    Eatin%  Nausea: No  Hypoglycemia and intervention: No  Fever: No  TPN and/or TF: No      /87 (BP Location: Left arm, Patient Position: Lying)   Pulse 98   Temp 98.6 °F (37 °C) (Oral)   Resp 20   Ht 6' 1" (1.854 m)   Wt 90.1 kg (198 lb 10.2 oz)   SpO2 98%   BMI 26.21 kg/m²      Labs Reviewed and Include    No results for input(s): GLU, CALCIUM, ALBUMIN, PROT, NA, K, CO2, CL, BUN, CREATININE, ALKPHOS, ALT, AST, BILITOT in the last 24 hours.  Lab Results "   Component Value Date    WBC 5.12 08/02/2019    HGB 9.4 (L) 08/02/2019    HCT 30.4 (L) 08/02/2019    MCV 91 08/02/2019     08/02/2019     No results for input(s): TSH, FREET4 in the last 168 hours.  Lab Results   Component Value Date    HGBA1C >14.0 (H) 07/12/2019       Nutritional status:   Body mass index is 26.21 kg/m².  Lab Results   Component Value Date    ALBUMIN 2.6 (L) 07/29/2019    ALBUMIN 2.5 (L) 07/28/2019    ALBUMIN 2.5 (L) 07/27/2019     No results found for: PREALBUMIN    Estimated Creatinine Clearance: 89.8 mL/min (based on SCr of 1.1 mg/dL).    Accu-Checks  Recent Labs     07/30/19  2136 07/31/19  0750 07/31/19  1202 07/31/19  1650 07/31/19  2117 08/01/19  0736 08/01/19  1139 08/01/19  1637 08/01/19  2231 08/02/19  0720   POCTGLUCOSE 115* 160* 107 120* 173* 142* 118* 298* 250* 233*       Current Medications and/or Treatments Impacting Glycemic Control  Immunotherapy:    Immunosuppressants     None        Steroids:   Hormones (From admission, onward)    None        Pressors:    Autonomic Drugs (From admission, onward)    None        Hyperglycemia/Diabetes Medications:   Antihyperglycemics (From admission, onward)    Start     Stop Route Frequency Ordered    07/30/19 2100  insulin detemir U-100 pen 41 Units      -- SubQ Nightly 07/30/19 1000    07/30/19 1130  insulin aspart U-100 pen 13 Units      -- SubQ 3 times daily with meals 07/30/19 1000    07/26/19 1509  insulin aspart U-100 pen 3 Units      -- SubQ As needed (PRN) 07/26/19 1410    07/20/19 2250  insulin aspart U-100 pen 0-5 Units      -- SubQ Before meals & nightly PRN 07/20/19 2152    07/20/19 1145  insulin regular (Humulin R) 100 Units in sodium chloride 0.9% 100 mL infusion      07/20 2300 IV Continuous 07/20/19 1035          ASSESSMENT and PLAN    Acute osteomyelitis  Management per primary and Podiatry  On IV cefazolin  Glucose control as above      Diabetic ketoacidosis without coma associated with type 2 diabetes mellitus  - DKA  on admission due to noncompliance of insulin use combined with osteomyelitis of foot  - Now resolved      Type 2 diabetes mellitus with hyperglycemia, with long-term current use of insulin  DKA is now resolved.     Goal BG while inpatient: 140-180 mg/dl  Current BG levels are slightly above goal    Pt's current insulin requirements might be higher due to infection.    Plan:   -Continue Detemir 41 units qHS   -Continue Novolog 13 units with meals   -Continue low dose correction   -Novolog 3 units PRN with snacks overnight  -POC AC/HS    Discharge Recommendations: TBD    Essential hypertension  - manage per primary          Roselyn Chow MD  Endocrinology  [facility]

## 2019-08-02 NOTE — PLAN OF CARE
Problem: Adult Inpatient Plan of Care  Goal: Plan of Care Review  Outcome: Ongoing (interventions implemented as appropriate)  No acute events overnight.  All vital signs stable.  No complaints of pain, just some mild numbness and tingling to hands from neuropathy.   AAOx4.  Safety maintained.  Instructed and reinforced non-weightbearing to left foot kristine boot at bedside.  Wound vac in place to continuous suction.  Instructed on close glycemic control for post op wound healing. Patient up in chair and with stand by assist transferred to standing and back in bed.  Expresses that he desires to go live with his sister in [place] and receive home health nursing and therapy.  picc line in place with continuous ancef gtt infusing.

## 2019-08-02 NOTE — PLAN OF CARE
Problem: Adult Inpatient Plan of Care  Goal: Plan of Care Review  Outcome: Outcome(s) achieved Date Met: 08/02/19 08/02/19 9061   Plan of Care Review   Plan of Care Reviewed With patient;sibling   Progress improving   Discharge teaching complete. Pt going home with his sister to received Home Health and IV Cefazolin via PICC line.  Pt awaiting arrival of Home Cefazolin before leaving today.

## 2019-08-02 NOTE — ASSESSMENT & PLAN NOTE
Indio Ryder Jr. is a 49 y.o. male  S/P Debridement left foot wound (DOS: 7/14 per Dr. 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 Almonte).    - Wound vac changed today. Home VAC is applied. Periwound appears viable but macerated.  Painted periwound with gentian violent and will continue wound vac at 125 mmHg continuous pressure while inpatient. Podiatry will change wound vac while inpatient.   - Per ID: Cefazolin 6 gram IV continuous infusion q 24 hours for 6 weeks from date of surgery as unclear if residual osteomyelitis is present without clean margin culture and pathology results (end date 8/28/19).   - Partial weight bearing to heel only left foot in post op shoe    DC Instructions:  Patient is to follow up with podiatry within 10 days of discharge.  Call [phone]  to make an appointment.  Home health/facility to do wound vac dressing changes every  as follows:  Rinse with saline, pat dry, place adaptic over graft, apply wound vac, place vac on 125mmHg slow continuous.  Dress in cast padding and coban

## 2019-08-02 NOTE — PLAN OF CARE
[facility]    HOME HEALTH ORDERS  FACE TO FACE ENCOUNTER    Patient Name: Indio Ryder Jr.  YOB: 1970    PCP: Lorena Thakur MD   PCP Address: [address] [place] [id number]  PCP Phone Number: [phone]  PCP Fax: [phone]    Encounter Date: 08/02/2019    Admit to Home Health    Diagnoses:  Active Hospital Problems    Diagnosis  POA    *Bacteremia due to group B Streptococcus [R78.81]  Yes    Impaired gait and mobility [R26.89]  Clinically Undetermined    Severe malnutrition [E43]  Unknown    Left foot infection [L08.9]  Yes    Limb pain [M79.609]  Yes    Sepsis [A41.9]  Yes    Acute osteomyelitis [M86.10]  Yes    Leukocytosis [D72.829]  Yes    Anemia [D64.9]  Yes    Foot ulcer [L97.509]  Yes    Neck pain [M54.2]  Yes    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       No future appointments.        I have seen and examined this patient face to face today. My clinical findings that support the need for the home health skilled services and home bound status are the following:  Weakness/numbness causing balance and gait disturbance due to Infection making it taxing to leave home.    Allergies:Review of patient's allergies indicates:  No Known Allergies    Diet: diabetic diet: 2200 calorie    Activities: activity as tolerated    Nursing:   SN to complete comprehensive assessment including routine vital signs. Instruct on disease process and s/s of  complications to report to MD. Review/verify medication list sent home with the patient at time of discharge  and instruct patient/caregiver as needed. Frequency may be adjusted depending on start of care date.    Notify MD if SBP > 160 or < 90; DBP > 90 or < 50; HR > 120 or < 50; Temp > 101; Other:           MISCELLANEOUS CARE:  Home Infusion Therapy:   SN to perform Infusion Therapy/Central Line Care.  Review Central Line Care & Central Line Flush with patient.    Administer (drug and dose): Cefazolin 6 gms continuous infusion   Last dose given: 8/02/19                         Home dose due 8/03/19  End Date 8/28/19    ESR, CRP, CBC and CMP to be drawn weekly with results faxed to the ID Department at  [phone]    Scrub the Hub: Prior to accessing the line, always perform a 30 second alcohol scrub  Each lumen of the central line is to be flushed at least daily with 10 mL Normal Saline and 3 mL Heparin flush (10 units/mL)  Skilled Nurse (SN) may draw blood from IV access  Blood Draw Procedure:   - Aspirate at least 5 mL of blood   - Discard   - Obtain specimen   - Change injection cap   - Flush with 20 mL Normal Saline followed by a                 3-5 mL Heparin flush (10 units/mL)  Central :   - Sterile dressing changes are done weekly and as needed.   - Use chlor-hexadine scrub to cleanse site, apply Biopatch to insertion site,       apply securement device dressing   - Injection caps are changed weekly and after EVERY lab draw.   - If sterile gauze is under dressing to control oozing,                 dressing change must be performed every 24 hours until gauze is not needed.    WOUND CARE ORDERS  Wound vac per outpatient therapy      Medications: Review discharge medications with patient and family and provide education.      Current Discharge Medication List      START taking these medications    Details   dextrose 5 % SolP 500 mL with ceFAZolin 1 gram SolR 6 g Inject 6 g into the vein  "continuous. for 9 days      insulin aspart U-100 (NOVOLOG) 100 unit/mL (3 mL) InPn pen Inject 13 Units into the skin 3 (three) times daily.  Qty: 35.1 mL, Refills: 3      insulin detemir U-100 (LEVEMIR FLEXTOUCH) 100 unit/mL (3 mL) SubQ InPn pen Inject 41 Units into the skin every evening.  Qty: 36.9 mL, Refills: 3         CONTINUE these medications which have NOT CHANGED    Details   metFORMIN (GLUCOPHAGE) 500 MG tablet Take 500 mg by mouth 2 (two) times daily with meals.      ONETOUCH DELICA LANCETS 33 gauge Misc       ONETOUCH ULTRA2 kit          STOP taking these medications       aspirin (ECOTRIN) 81 MG EC tablet Comments:   Reason for Stopping:         atorvastatin (LIPITOR) 80 MG tablet Comments:   Reason for Stopping:         BD ULTRA-FINE SASCHA PEN NEEDLES 32 gauge x 5/32" Ndle Comments:   Reason for Stopping:         blood sugar diagnostic Strp Comments:   Reason for Stopping:         dulaglutide (TRULICITY) 0.75 mg/0.5 mL PnIj Comments:   Reason for Stopping:         insulin glargine (LANTUS SOLOSTAR) 100 unit/mL (3 mL) InPn pen Comments:   Reason for Stopping:         lisinopril 10 MG tablet Comments:   Reason for Stopping:         tizanidine (ZANAFLEX) 4 MG tablet Comments:   Reason for Stopping:               I certify that this patient is confined to his home and needs intermittent skilled nursing care.      "

## 2019-08-02 NOTE — ASSESSMENT & PLAN NOTE
DKA is now resolved.     Goal BG while inpatient: 140-180 mg/dl  Current BG levels are slightly above goal    Pt's current insulin requirements might be higher due to infection.    Plan:   -Increase Detemir to 42 units qHS   -Increase Novolog to 15 units with meals   -Continue low dose correction   -Novolog 3 units PRN with snacks overnight  -POC AC/HS    Discharge Recommendations: TBD

## 2019-08-02 NOTE — SUBJECTIVE & OBJECTIVE
"Interval HPI:   Overnight events:  VS stable    Eatin%  Nausea: No  Hypoglycemia and intervention: No  Fever: No  TPN and/or TF: No      /87 (BP Location: Left arm, Patient Position: Lying)   Pulse 98   Temp 98.6 °F (37 °C) (Oral)   Resp 20   Ht 6' 1" (1.854 m)   Wt 90.1 kg (198 lb 10.2 oz)   SpO2 98%   BMI 26.21 kg/m²     Labs Reviewed and Include    No results for input(s): GLU, CALCIUM, ALBUMIN, PROT, NA, K, CO2, CL, BUN, CREATININE, ALKPHOS, ALT, AST, BILITOT in the last 24 hours.  Lab Results   Component Value Date    WBC 5.12 2019    HGB 9.4 (L) 2019    HCT 30.4 (L) 2019    MCV 91 2019     2019     No results for input(s): TSH, FREET4 in the last 168 hours.  Lab Results   Component Value Date    HGBA1C >14.0 (H) 2019       Nutritional status:   Body mass index is 26.21 kg/m².  Lab Results   Component Value Date    ALBUMIN 2.6 (L) 2019    ALBUMIN 2.5 (L) 2019    ALBUMIN 2.5 (L) 2019     No results found for: PREALBUMIN    Estimated Creatinine Clearance: 89.8 mL/min (based on SCr of 1.1 mg/dL).    Accu-Checks  Recent Labs     19  2136 19  0750 19  1202 19  1650 19  2117 19  0736 19  1139 19  1637 19  2231 19  0720   POCTGLUCOSE 115* 160* 107 120* 173* 142* 118* 298* 250* 233*       Current Medications and/or Treatments Impacting Glycemic Control  Immunotherapy:    Immunosuppressants     None        Steroids:   Hormones (From admission, onward)    None        Pressors:    Autonomic Drugs (From admission, onward)    None        Hyperglycemia/Diabetes Medications:   Antihyperglycemics (From admission, onward)    Start     Stop Route Frequency Ordered    19 2100  insulin detemir U-100 pen 41 Units      -- SubQ Nightly 19 1000    19 1130  insulin aspart U-100 pen 13 Units      -- SubQ 3 times daily with meals 19 1000    19 1509  insulin aspart " U-100 pen 3 Units      -- SubQ As needed (PRN) 07/26/19 1410    07/20/19 2250  insulin aspart U-100 pen 0-5 Units      -- SubQ Before meals & nightly PRN 07/20/19 2152 07/20/19 1145  insulin regular (Humulin R) 100 Units in sodium chloride 0.9% 100 mL infusion      07/20 2300 IV Continuous 07/20/19 1035

## 2019-08-02 NOTE — PROGRESS NOTES
[facility]  Podiatry  Progress Note    Patient Name: Indio Ryder Jr.  MRN: [id number]  Admission Date: 7/12/2019  Hospital Length of Stay: 21 days  Attending Physician: Luis Rust MD  Primary Care Provider: Lorena Thakur MD     Subjective:     Interval History:   Patient seen at bedside s/p left foot partial 5th ray amputation with irrigation and debridement and application of graft (DOS: 7/17/2019). No acute distress. Denies any pedal pain. Denies calf pain. No new complaints. Dressings to left foot clean, dry, intact. Wound vac to left foot wound intact running @ 125 mmHg continuous pressure. Admits to nausea since ED admission, no vomiting, fever, chills.     7/20 Patient Seen at bedside for dressing change.  Wound vac intact.  Patient denies F/C/N/V.    7/22 Patient Seen at bedside for dressing change.  Wound vac intact.  Patient denies F/C/N/V.    7/24 Patient seen at bedside for dressing change. Wound vac to left foot intact running at 125 mmHg continuous pressure. Patient denies nausea, vomiting, fever, chills. Denies any pain. No acute distress. No new complaints    7/26: Patient Seen at bedside for dressing change.  Wound vac intact.  Patient denies F/C/N/V.    7/29: Patient Seen at bedside for dressing change.  Wound vac intact.  Denies any pain. No acute distress. Patient denies F/C/N/V.    7/31: Patient seen at bedside for dressing change. Wound vac intact. Denies N/V/F/C.  8/2: NAEON. Vac changed, home vac placed. Pt would like to go home with .    Follow-up For: Procedure(s) (LRB):  DEBRIDEMENT, FOOT with leftt 5th ray partial amputation with Neox Graft (Left)    Post-Operative Day: 14 Days Post-Op    Scheduled Meds:   amLODIPine  10 mg Oral Daily    atorvastatin  80 mg Oral Daily    ceFAZolin(ANCEF) in D5W 500 mL CONTINUOUS INFUSION  6 g Intravenous Q24H    enoxaparin  40 mg Subcutaneous Daily    gabapentin  300 mg Oral TID    insulin aspart U-100  13 Units  Subcutaneous TIDWM    insulin detemir U-100  41 Units Subcutaneous QHS    sodium chloride 0.9%  10 mL Intravenous Q6H     Continuous Infusions:    PRN Meds:acetaminophen, calcium carbonate, Dextrose 10% Bolus, Dextrose 10% Bolus, glucagon (human recombinant), glucose, glucose, hydrALAZINE, insulin aspart U-100, insulin aspart U-100, ondansetron, promethazine (PHENERGAN) IVPB, sodium chloride 0.9%, sodium chloride 0.9%, Flushing PICC Protocol **AND** sodium chloride 0.9% **AND** sodium chloride 0.9%    Review of Systems   Constitutional: Negative for appetite change, chills and fever.   Eyes: Negative for visual disturbance.   Respiratory: Negative for cough and shortness of breath.    Cardiovascular: Negative for chest pain and leg swelling.   Gastrointestinal: Negative for abdominal pain, constipation, diarrhea, nausea and vomiting.   Genitourinary: Negative for dysuria, frequency and hematuria.   Musculoskeletal: Negative for back pain and myalgias.   Skin: Positive for wound (left foot surgical wound; right foot plantar ulcer). Negative for color change and rash.   Neurological: Negative for dizziness, light-headedness and headaches.   Psychiatric/Behavioral: Negative for agitation and behavioral problems. The patient is not nervous/anxious.      Objective:     Vital Signs (Most Recent):  Temp: 97.6 °F (36.4 °C) (08/02/19 1121)  Pulse: 107 (08/02/19 1200)  Resp: 18 (08/02/19 1121)  BP: 133/82 (08/02/19 1121)  SpO2: 98 % (08/02/19 1121) Vital Signs (24h Range):  Temp:  [97.6 °F (36.4 °C)-98.7 °F (37.1 °C)] 97.6 °F (36.4 °C)  Pulse:  [] 107  Resp:  [18-20] 18  SpO2:  [95 %-98 %] 98 %  BP: (109-134)/(66-87) 133/82     Weight: 90.1 kg (198 lb 10.2 oz)  Body mass index is 26.21 kg/m².    Foot Exam    General  Orientation: alert and oriented to person, place, and time       Right Foot/Ankle     Inspection and Palpation  Tenderness: none   Swelling: none   Skin Exam: ulcer;     Neurovascular  Dorsalis pedis:  1+  Posterior tibial: 1+  Saphenous nerve sensation: diminished  Tibial nerve sensation: diminished  Superficial peroneal nerve sensation: diminished  Deep peroneal nerve sensation: diminished  Sural nerve sensation: diminished      Left Foot/Ankle      Inspection and Palpation  Ecchymosis: none  Tenderness: none   Swelling: none   Skin Exam: drainage, skin changes and ulcer; (surgical wound 2/2 partial 5th ray amp with irrigation and debridement; proximal plantar sutures remain intact; serosanguinous drainage noted in wound vac cannister; no edema, no erythema, no signs of infection noted)    Neurovascular  Dorsalis pedis: 1+  Posterior tibial: 1+  Saphenous nerve sensation: diminished  Tibial nerve sensation: diminished  Superficial peroneal nerve sensation: diminished  Deep peroneal nerve sensation: diminished  Sural nerve sensation: diminished            Laboratory:  A1C:   Recent Labs   Lab 07/12/19  1023   HGBA1C >14.0*     CBC:   Recent Labs   Lab 08/02/19  0358   WBC 5.12   RBC 3.35*   HGB 9.4*   HCT 30.4*      MCV 91   MCH 28.1   MCHC 30.9*     CRP:   No results for input(s): CRP in the last 168 hours.  ESR:   No results for input(s): SEDRATE in the last 168 hours.  Wound Cultures:   Recent Labs   Lab 07/12/19  1257 07/14/19  1728 07/17/19  1214   LABAERO STREPTOCOCCUS AGALACTIAE (GROUP B)  Few  Beta-hemolytic streptococci are routinely susceptible to   penicillins,cephalosporins and carbapenems.  Susceptibility testing not routinely performed  *  STAPHYLOCOCCUS AUREUS  Few  * STREPTOCOCCUS AGALACTIAE (GROUP B)  Rare  Beta-hemolytic streptococci are routinely susceptible to   penicillins,cephalosporins and carbapenems.  Susceptibility testing not routinely performed  Skin janes also present  * Skin janes,  no predominant organism     Microbiology Results (last 7 days)     Procedure Component Value Units Date/Time    Culture, Anaerobe [[id number]] Collected:  07/17/19 1214    Order Status:  Completed  Specimen:  Wound from Foot, Left Updated:  07/26/19 1316     Anaerobic Culture No anaerobes isolated    Narrative:       Left foot wound        Specimen (12h ago, onward)    None          Diagnostic Results:  I have reviewed all pertinent imaging results/findings within the past 24 hours.    Clinical Findings:  Left foot with wound vac application intact; Running at 125 mmHg continuous pressure. Proximal sutures intact.    Ulcer Location: left lateral plantar foot  Measurements: 8 cm x 4 cm x 1 cm  Periwound: viable/ maceration  Drainage: serosanguinous.  Base:  80% granular. 20% fibrin., wound graft intact  Signs of infection: None. No erythema, no edema, negative probe to bone     7/29              7/26          7/24          7/22              7/20            7/18      7/16      7/15 s/p I&D      7/12 post ED bedside debridement      7/12 pre ED bedside debridement          Assessment/Plan:     Diabetic ulcer of left midfoot associated with type 2 diabetes mellitus, with bone involvement without evidence of necrosis  Indio Ryder Jr. is a 49 y.o. male  S/P Debridement left foot wound (DOS: 7/14 per Dr. 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 Almonte).    - Wound vac changed today. Home VAC is applied. Periwound appears viable but macerated.  Painted periwound with gentian violent and will continue wound vac at 125 mmHg continuous pressure while inpatient. Podiatry will change wound vac while inpatient.   - Per ID: Cefazolin 6 gram IV continuous infusion q 24 hours for 6 weeks from date of surgery as unclear if residual osteomyelitis is present without clean margin culture and pathology results (end date 8/28/19).   - Partial weight bearing to heel only left foot in post op shoe    DC Instructions:  Patient is to follow up with podiatry within 10 days of discharge.  Call [phone]  to make an appointment.  Home  health/facility to do wound vac dressing changes every  as follows:  Rinse with saline, pat dry, place adaptic over graft, apply wound vac, place vac on 125mmHg slow continuous.  Dress in cast padding and coban      Foot ulcer  Mr. Ryder is a 49 y.o M who presented to ED with bilateral foot ulcers and constitutional symptoms of nausea and vomiting. Right plantar foot ulcer remains stable. Continue betadine wet to dry dressings right foot. Left foot ulcer was infected upon evaluation in the ED. Pt s/p I&D (DOS:7/14), s/p partial 5th ray amputation with allograft application and wound vac application (DOS: 7/17/19)  Podiatry will continue to monitor.    Acute osteomyelitis            Antonio Dover MD  Podiatry  [facility]

## 2019-08-02 NOTE — SUBJECTIVE & OBJECTIVE
Subjective:     Interval History:   Patient seen at bedside s/p left foot partial 5th ray amputation with irrigation and debridement and application of graft (DOS: 7/17/2019). No acute distress. Denies any pedal pain. Denies calf pain. No new complaints. Dressings to left foot clean, dry, intact. Wound vac to left foot wound intact running @ 125 mmHg continuous pressure. Admits to nausea since ED admission, no vomiting, fever, chills.     7/20 Patient Seen at bedside for dressing change.  Wound vac intact.  Patient denies F/C/N/V.    7/22 Patient Seen at bedside for dressing change.  Wound vac intact.  Patient denies F/C/N/V.    7/24 Patient seen at bedside for dressing change. Wound vac to left foot intact running at 125 mmHg continuous pressure. Patient denies nausea, vomiting, fever, chills. Denies any pain. No acute distress. No new complaints    7/26: Patient Seen at bedside for dressing change.  Wound vac intact.  Patient denies F/C/N/V.    7/29: Patient Seen at bedside for dressing change.  Wound vac intact.  Denies any pain. No acute distress. Patient denies F/C/N/V.    7/31: Patient seen at bedside for dressing change. Wound vac intact. Denies N/V/F/C.  8/2: NAEON. Vac changed, home vac placed. Pt would like to go home with .    Follow-up For: Procedure(s) (LRB):  DEBRIDEMENT, FOOT with leftt 5th ray partial amputation with Neox Graft (Left)    Post-Operative Day: 14 Days Post-Op    Scheduled Meds:   amLODIPine  10 mg Oral Daily    atorvastatin  80 mg Oral Daily    ceFAZolin(ANCEF) in D5W 500 mL CONTINUOUS INFUSION  6 g Intravenous Q24H    enoxaparin  40 mg Subcutaneous Daily    gabapentin  300 mg Oral TID    insulin aspart U-100  13 Units Subcutaneous TIDWM    insulin detemir U-100  41 Units Subcutaneous QHS    sodium chloride 0.9%  10 mL Intravenous Q6H     Continuous Infusions:    PRN Meds:acetaminophen, calcium carbonate, Dextrose 10% Bolus, Dextrose 10% Bolus, glucagon (human recombinant),  glucose, glucose, hydrALAZINE, insulin aspart U-100, insulin aspart U-100, ondansetron, promethazine (PHENERGAN) IVPB, sodium chloride 0.9%, sodium chloride 0.9%, Flushing PICC Protocol **AND** sodium chloride 0.9% **AND** sodium chloride 0.9%    Review of Systems   Constitutional: Negative for appetite change, chills and fever.   Eyes: Negative for visual disturbance.   Respiratory: Negative for cough and shortness of breath.    Cardiovascular: Negative for chest pain and leg swelling.   Gastrointestinal: Negative for abdominal pain, constipation, diarrhea, nausea and vomiting.   Genitourinary: Negative for dysuria, frequency and hematuria.   Musculoskeletal: Negative for back pain and myalgias.   Skin: Positive for wound (left foot surgical wound; right foot plantar ulcer). Negative for color change and rash.   Neurological: Negative for dizziness, light-headedness and headaches.   Psychiatric/Behavioral: Negative for agitation and behavioral problems. The patient is not nervous/anxious.      Objective:     Vital Signs (Most Recent):  Temp: 97.6 °F (36.4 °C) (08/02/19 1121)  Pulse: 107 (08/02/19 1200)  Resp: 18 (08/02/19 1121)  BP: 133/82 (08/02/19 1121)  SpO2: 98 % (08/02/19 1121) Vital Signs (24h Range):  Temp:  [97.6 °F (36.4 °C)-98.7 °F (37.1 °C)] 97.6 °F (36.4 °C)  Pulse:  [] 107  Resp:  [18-20] 18  SpO2:  [95 %-98 %] 98 %  BP: (109-134)/(66-87) 133/82     Weight: 90.1 kg (198 lb 10.2 oz)  Body mass index is 26.21 kg/m².    Foot Exam    General  Orientation: alert and oriented to person, place, and time       Right Foot/Ankle     Inspection and Palpation  Tenderness: none   Swelling: none   Skin Exam: ulcer;     Neurovascular  Dorsalis pedis: 1+  Posterior tibial: 1+  Saphenous nerve sensation: diminished  Tibial nerve sensation: diminished  Superficial peroneal nerve sensation: diminished  Deep peroneal nerve sensation: diminished  Sural nerve sensation: diminished      Left Foot/Ankle      Inspection  and Palpation  Ecchymosis: none  Tenderness: none   Swelling: none   Skin Exam: drainage, skin changes and ulcer; (surgical wound 2/2 partial 5th ray amp with irrigation and debridement; proximal plantar sutures remain intact; serosanguinous drainage noted in wound vac cannister; no edema, no erythema, no signs of infection noted)    Neurovascular  Dorsalis pedis: 1+  Posterior tibial: 1+  Saphenous nerve sensation: diminished  Tibial nerve sensation: diminished  Superficial peroneal nerve sensation: diminished  Deep peroneal nerve sensation: diminished  Sural nerve sensation: diminished            Laboratory:  A1C:   Recent Labs   Lab 07/12/19  1023   HGBA1C >14.0*     CBC:   Recent Labs   Lab 08/02/19  0358   WBC 5.12   RBC 3.35*   HGB 9.4*   HCT 30.4*      MCV 91   MCH 28.1   MCHC 30.9*     CRP:   No results for input(s): CRP in the last 168 hours.  ESR:   No results for input(s): SEDRATE in the last 168 hours.  Wound Cultures:   Recent Labs   Lab 07/12/19  1257 07/14/19  1728 07/17/19  1214   LABAERO STREPTOCOCCUS AGALACTIAE (GROUP B)  Few  Beta-hemolytic streptococci are routinely susceptible to   penicillins,cephalosporins and carbapenems.  Susceptibility testing not routinely performed  *  STAPHYLOCOCCUS AUREUS  Few  * STREPTOCOCCUS AGALACTIAE (GROUP B)  Rare  Beta-hemolytic streptococci are routinely susceptible to   penicillins,cephalosporins and carbapenems.  Susceptibility testing not routinely performed  Skin janes also present  * Skin janes,  no predominant organism     Microbiology Results (last 7 days)     Procedure Component Value Units Date/Time    Culture, Anaerobe [[id number]] Collected:  07/17/19 1214    Order Status:  Completed Specimen:  Wound from Foot, Left Updated:  07/26/19 1316     Anaerobic Culture No anaerobes isolated    Narrative:       Left foot wound        Specimen (12h ago, onward)    None          Diagnostic Results:  I have reviewed all pertinent imaging results/findings  within the past 24 hours.    Clinical Findings:  Left foot with wound vac application intact; Running at 125 mmHg continuous pressure. Proximal sutures intact.    Ulcer Location: left lateral plantar foot  Measurements: 8 cm x 4 cm x 1 cm  Periwound: viable/ maceration  Drainage: serosanguinous.  Base:  80% granular. 20% fibrin., wound graft intact  Signs of infection: None. No erythema, no edema, negative probe to bone     7/29              7/26          7/24          7/22              7/20            7/18      7/16      7/15 s/p I&D      7/12 post ED bedside debridement      7/12 pre ED bedside debridement

## 2019-08-03 NOTE — DISCHARGE SUMMARY
Discharge Summary  Hospital Medicine    Attending Provider on Discharge: Luis Rust MD  Hospital Medicine Team: [facility]  Date of Admission:  7/12/2019     Date of Discharge:  8/2/2019  Length of Stay:  LOS: 21 days   Code status: Full Code    Active Hospital Problems    Diagnosis  POA    *Bacteremia due to group B Streptococcus [R78.81]  Yes    Diabetic ulcer of left midfoot associated with type 2 diabetes mellitus, with bone involvement without evidence of necrosis [E11.621, L97.426]  Yes    Impaired gait and mobility [R26.89]  Clinically Undetermined    Severe malnutrition [E43]  Unknown    Left foot infection [L08.9]  Yes    Limb pain [M79.609]  Yes    Sepsis [A41.9]  Yes    Acute osteomyelitis [M86.10]  Yes    Leukocytosis [D72.829]  Yes    Anemia [D64.9]  Yes    Foot ulcer [L97.509]  Yes    Neck pain [M54.2]  Yes    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        HPI Indio Ryder Jr. is a 49 y.o. male with a PMH of T2DM (poorly  controlled, with long term insulin use) and HTN who presented to the ED on 7/12/2019 diagnosed with  Vomiting (vomiting for past 4 days, denies sick contacts)   Oriented x3 accompanied by daughter at the bedside.  Mentions that he wears lower extremity stockings for edema. Reportedly formed a new ulcer on the left lateral aspect of the foot about 10 days back.  had 4-5 days of nausea and vomiting. Vomiting is non-bloody, No abdominal pain..  It occurs immediately  after eating and he has not been able to keep anything down. Associated with fevers and chills but no objective temperature measurement.  At baseline only takes insulin once a day stop taking more than a week because of nausea and vomiting and poor oral intake.  Does not check fingersticks regularly at home. recently lost his insurance and has been unable to follow up with wound care.  His last follow-up with primary care provider and podiatrist was about 2 years.  He has been managing his right foot ulcer at home with dressing but over the past 2 weeks an ulcer on his left foot has developed. He denies any pain or injury- has chronic numbness of bilateral lower extremities and hands from diabetic neuropathy.  Found to be in DKA in the emergency department with beta hydroxybutyrate elevated at 4.1.  Started on insulin drip      Hospital Course    07/13/2019   Temperature of 101.5 last night.  Anion gap resolved- bicarbonate 23, insulin drip discontinued.  Started on Levemir 23 units q.a.m. 7 units as part t.i.d. with meals with low-dose sliding scale.  Diabetic diet and NPO from midnight for possible intervention.  MRI of the foot- Ulceration involving the lateral plantar aspect of the distal left foot, with findings concerning for exposed bone involving the distal aspect of the 5th metatarsal and proximal phalange of the 5th toe.  There are associated changes concerning for osteomyelitis involving the distal aspect of the 5th metatarsal and entirety of the right toe.  There is osseous hyperemia involving the proximal phalange of the 2nd toe, early changes of osteomyelitis are a consideration . aerobic culture  and blood culture with Group B strep. discontinued vancomycin        07/14/2019  Had urinary retention of 800 mL but spontaneously voided.  Started on transitional insulin drip as NPO from midnight for OR today.  Hypokalemia repeat.  Blood cultures daily until clear.  Aerobic cultures with group B  Streptococcus and Staph aureus.  Added vancomycin ( monitor for toxicity)     07/15/2019   s/p I&D and 5th ray amputation in the OR  on  07/14/2019.  ALISSA ordered to evaluate vascular status intraoperative Cultures pending . clean margin cultures and pathology pending. Deescalated from vanc and zosyn to cefazolin 6 gram IV cont infusion.  blood cultures from 07/13/2019 no growth.  Continues to have nausea vomiting since unable to tolerate anything by mouth.  Started on IV hydration and Reglan     07/23/2019  underwent repeat I&D and left partial 5th ray amputation 7/17.   Per OP note,  purulent drainage was expressed proximal to distal from the level of the plantar aspect of the left calcaneus to the open wound on the plantar midfoot. Purulent drainage was also manually expressed from the plantar distal aspect of the forefoot. Cultures of this were sent.   A small portion of the distal metatarsal was reported as sent as a clean margin to Micro and pathology.  The wound was partially closed, Neox allograft and wound Vac placed.  Repeat blood cultures remain NGTD.   ABIs without significant PAD.   Tolerating oral diet without nausea and vomiting.  Status post PICC line placement on 07/23/2019.  Wound VAC in place to left foot.  PT evaluation, crutches ordered per Podiatry.  No weight-bearing to left foot     7/27/19  Insurance denied rehab placement.  s/p  peer to peer. ID recommending  Cefazolin 6 gram IV continuous infusion q 24 hours for 6 weeks from date of surgery as unclear if residual osteomyelitis is present without clean margin culture and pathology results (end date 8/28/19). will qualify for SNF per insurance     8/2 WIll dc home with home infusion. Wound care arranged via out patient services.       Recent Labs   Lab 07/31/19  0513 08/01/19  0401 08/02/19  0358   WBC 5.38 5.56 5.12   HGB 9.5* 9.8* 9.4*   HCT 31.3* 32.5* 30.4*    218 218     Recent Labs   Lab 07/27/19  2302  07/28/19  0519  07/29/19  0530  07/31/19  0513 08/01/19  0401 08/02/19  0358     --  139 137  --   --   --   --    K 4.0  --  4.4 4.2  --   --   --   --    CL 96  --  98 96  --   --   --   --    CO2 33*  --  30* 32*  --   --   --   --    BUN 14  --  13 19  --   --   --   --    CREATININE 0.8  --  0.9 1.1  --   --   --   --    *  --  251* 266*  --   --   --   --    CALCIUM 9.6  --  9.8 9.5  --   --   --   --    MG  --    < > 1.6 1.6   < > 1.6 1.7 1.5*   PHOS  --    < > 4.2 3.7   < > 3.2 3.9 3.4    < > = values in this interval not displayed.     Recent Labs   Lab 07/27/19  2302 07/28/19  0519 07/29/19  0530   ALKPHOS 154* 155* 151*   ALT 9* 10 9*   AST 29 29 17   ALBUMIN 2.5* 2.5* 2.6*   PROT 7.2 7.3 7.5   BILITOT 0.2 0.2 0.2      No results for input(s): CPK, CPKMB, MB, TROPONINI in the last 72 hours.  No results for input(s): LACTATE in the last 72 hours.    Recent Labs   Lab 08/01/19  1139 08/01/19  1637 08/01/19  2231 08/02/19  0720 08/02/19  1025 08/02/19  1612   POCTGLUCOSE 118* 298* 250* 233* 120* 198*      Hemoglobin A1C   Date Value Ref Range Status   07/12/2019 >14.0 (H) 4.0 - 5.6 % Final     Comment:     ADA Screening Guidelines:  5.7-6.4%  Consistent with prediabetes  >or=6.5%  Consistent with diabetes  High levels of fetal hemoglobin interfere with the HbA1C  assay. Heterozygous hemoglobin variants (HbS, HgC, etc)do  not significantly interfere with this assay.   However, presence of multiple variants may affect accuracy.     10/11/2017 9.5 (H) 4.0 - 5.6 % Final     Comment:     According to ADA guidelines, hemoglobin A1c <7.0% represents  optimal control in non-pregnant diabetic patients. Different  metrics may apply to specific patient populations.   Standards of Medical Care in Diabetes-2016.  For the purpose of screening for the presence of diabetes:  <5.7%     Consistent with the absence of diabetes  5.7-6.4%  Consistent with increasing risk for diabetes   (prediabetes)  >or=6.5%  Consistent with  diabetes  Currently, no consensus exists for use of hemoglobin A1c  for diagnosis of diabetes for children.  This Hemoglobin A1c assay has significant interference with fetal   hemoglobin   (HbF). The results are invalid for patients with abnormal amounts of   HbF,   including those with known Hereditary Persistence   of Fetal Hemoglobin. Heterozygous hemoglobin variants (HbAS, HbAC,   HbAD, HbAE, HbA2) do not significantly interfere with this assay;   however, presence of multiple variants in a sample may impact the %   interference.     10/06/2017 9.1 (H) 4.0 - 5.6 % Final     Comment:     According to ADA guidelines, hemoglobin A1c <7.0% represents  optimal control in non-pregnant diabetic patients. Different  metrics may apply to specific patient populations.   Standards of Medical Care in Diabetes-2016.  For the purpose of screening for the presence of diabetes:  <5.7%     Consistent with the absence of diabetes  5.7-6.4%  Consistent with increasing risk for diabetes   (prediabetes)  >or=6.5%  Consistent with diabetes  Currently, no consensus exists for use of hemoglobin A1c  for diagnosis of diabetes for children.  This Hemoglobin A1c assay has significant interference with fetal   hemoglobin   (HbF). The results are invalid for patients with abnormal amounts of   HbF,   including those with known Hereditary Persistence   of Fetal Hemoglobin. Heterozygous hemoglobin variants (HbAS, HbAC,   HbAD, HbAE, HbA2) do not significantly interfere with this assay;   however, presence of multiple variants in a sample may impact the %   interference.         Procedures: amputaion    Consultants:ID, Podiatry    Discharge Medication List as of 8/2/2019  3:31 PM      START taking these medications    Details   dextrose 5 % SolP 500 mL with ceFAZolin 1 gram SolR 6 g Inject 6 g into the vein continuous. for 9 days, Starting Mon 7/22/2019, Until Wed 7/31/2019, No Print      insulin aspart U-100 (NOVOLOG) 100 unit/mL (3 mL) InPn  "pen Inject 13 Units into the skin 3 (three) times daily., Starting Fri 8/2/2019, Until Sat 8/1/2020, Normal      insulin detemir U-100 (LEVEMIR FLEXTOUCH) 100 unit/mL (3 mL) SubQ InPn pen Inject 41 Units into the skin every evening., Starting Fri 8/2/2019, Until Sat 8/1/2020, Normal      pen needle, diabetic 31 gauge x 5/16" Ndle Use to inject insulin four times daily, Starting Fri 8/2/2019, Normal         CONTINUE these medications which have NOT CHANGED    Details   metFORMIN (GLUCOPHAGE) 500 MG tablet Take 500 mg by mouth 2 (two) times daily with meals., Historical Med      ONETOUCH DELICA LANCETS 33 gauge Misc Starting Thu 5/4/2017, Historical Med      ONETOUCH ULTRA2 kit Historical Med         STOP taking these medications       aspirin (ECOTRIN) 81 MG EC tablet Comments:   Reason for Stopping:         atorvastatin (LIPITOR) 80 MG tablet Comments:   Reason for Stopping:         BD ULTRA-FINE SASCHA PEN NEEDLES 32 gauge x 5/32" Ndle Comments:   Reason for Stopping:         blood sugar diagnostic Strp Comments:   Reason for Stopping:         dulaglutide (TRULICITY) 0.75 mg/0.5 mL PnIj Comments:   Reason for Stopping:         insulin glargine (LANTUS SOLOSTAR) 100 unit/mL (3 mL) InPn pen Comments:   Reason for Stopping:         lisinopril 10 MG tablet Comments:   Reason for Stopping:         tizanidine (ZANAFLEX) 4 MG tablet Comments:   Reason for Stopping:               Discharge Diet:diabetic diet: 2200 calorie   Activity: activity as tolerated    Discharge Condition: Good    Disposition: Home or Self Care    Tests pending at the time of discharge: none      Time spent  on the discharge of the patient including review of hospital course with the patient. reviewing discharge medications and arranging follow-up care 45 minutes    Discharge examination Patient was seen and examined on the date of discharge and determined to be suitable for discharge.    Discharge plan     No future appointments.    Luis Rust, " MD

## 2019-08-05 ENCOUNTER — TELEPHONE (OUTPATIENT)
Dept: PODIATRY | Facility: CLINIC | Age: 49
End: 2019-08-05

## 2019-08-05 NOTE — PLAN OF CARE
Patient discharged home with home infusions and outpatient wound care.  Patient's sister provided transportation home via personal vehicle.         08/05/19 0750   Final Note   Assessment Type Final Discharge Note   Anticipated Discharge Disposition Home   What phone number can be called within the next 1-3 days to see how you are doing after discharge? [phone]   Hospital Follow Up  Appt(s) scheduled? Yes   Discharge plans and expectations educations in teach back method with documentation complete? Yes   Right Care Referral Info   Post Acute Recommendation   (Home infusions and outpatient wound care)

## 2019-08-05 NOTE — TELEPHONE ENCOUNTER
----- Message from Antonio Dover MD sent at 8/5/2019  8:49 AM CDT -----  Please setup and appt for Mr. Ryder this week with Dr. Almonte for post-op follow up. Thank you.

## 2019-08-06 ENCOUNTER — PATIENT OUTREACH (OUTPATIENT)
Dept: ADMINISTRATIVE | Facility: CLINIC | Age: 49
End: 2019-08-06

## 2019-08-06 NOTE — PATIENT INSTRUCTIONS
Please forward this important TCC information to your provider in order to maximize the post discharge care delivery of this patient.    C3 nurse spoke with Indio Ryder Jr.  for a TCC post hospital discharge follow up call. The patient does not have a scheduled HOSFU appointment with Lorena Thakur MD within 7-14 days post hospital discharge date 08/02/2019 C3 nurse was unable to schedule HOSFU appointment in [facility].  Please contact patient and schedule follow up appointment using HOSFU visit type on or before 08/16/2019.      Respectfully,    Elvia White LPN    [facility]    [email or web address]       Please do not reply to this message, as this inbox is not routinely monitored.

## 2019-08-07 NOTE — PLAN OF CARE
08/07/19 1021   Post-Acute Status   Post-Acute Authorization Placement   Post-Acute Placement Status Set-up Complete     Patient discharged 08/02/2019 with home infusion with [facility] and will follow up with [facility] for outpatient wound care. Patient transported home by his sister.     Josefina Hammnod, MARCIE  [facility]   [id number]

## 2019-08-09 ENCOUNTER — OFFICE VISIT (OUTPATIENT)
Dept: PODIATRY | Facility: CLINIC | Age: 49
End: 2019-08-09
Payer: MEDICAID

## 2019-08-09 DIAGNOSIS — L97.512 SKIN ULCER OF RIGHT FOOT WITH FAT LAYER EXPOSED: ICD-10-CM

## 2019-08-09 DIAGNOSIS — L97.426 DIABETIC ULCER OF LEFT MIDFOOT ASSOCIATED WITH TYPE 2 DIABETES MELLITUS, WITH BONE INVOLVEMENT WITHOUT EVIDENCE OF NECROSIS: ICD-10-CM

## 2019-08-09 DIAGNOSIS — E11.621 DIABETIC ULCER OF LEFT MIDFOOT ASSOCIATED WITH TYPE 2 DIABETES MELLITUS, WITH BONE INVOLVEMENT WITHOUT EVIDENCE OF NECROSIS: ICD-10-CM

## 2019-08-09 DIAGNOSIS — E11.10 DIABETIC KETOACIDOSIS WITHOUT COMA ASSOCIATED WITH TYPE 2 DIABETES MELLITUS: Primary | ICD-10-CM

## 2019-08-09 PROCEDURE — 11042 PR DEBRIDEMENT, SKIN, SUB-Q TISSUE,=<20 SQ CM: ICD-10-PCS | Mod: 79,S$PBB,, | Performed by: PODIATRIST

## 2019-08-09 PROCEDURE — 99499 NO LOS: ICD-10-PCS | Mod: S$PBB,,, | Performed by: PODIATRIST

## 2019-08-09 PROCEDURE — 99211 OFF/OP EST MAY X REQ PHY/QHP: CPT | Mod: PBBFAC,25 | Performed by: PODIATRIST

## 2019-08-09 PROCEDURE — 11042 DBRDMT SUBQ TIS 1ST 20SQCM/<: CPT | Mod: PBBFAC,RT | Performed by: PODIATRIST

## 2019-08-09 PROCEDURE — 99999 PR PBB SHADOW E&M-EST. PATIENT-LVL I: ICD-10-PCS | Mod: PBBFAC,,, | Performed by: PODIATRIST

## 2019-08-09 PROCEDURE — 11042 DBRDMT SUBQ TIS 1ST 20SQCM/<: CPT | Mod: 79,S$PBB,, | Performed by: PODIATRIST

## 2019-08-09 PROCEDURE — 99499 UNLISTED E&M SERVICE: CPT | Mod: S$PBB,,, | Performed by: PODIATRIST

## 2019-08-09 PROCEDURE — 99999 PR PBB SHADOW E&M-EST. PATIENT-LVL I: CPT | Mod: PBBFAC,,, | Performed by: PODIATRIST

## 2019-08-09 NOTE — PROGRESS NOTES
Subjective:      Patient ID: Indio Ryder Jr. is a 49 y.o. male.    Chief Complaint: No chief complaint on file.    Indio is a 49 y.o. male who presents to the clinic for evaluation and treatment of high risk feet. 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 The patient's chief complaint is follow up L foot. Has had no issues w/ wound vac nor picc. Is staying w/ his sister in [place]. . This patient has documented high risk feet requiring routine maintenance secondary to diabetes mellitis and those secondary complications of diabetes, as mentioned..    PCP: Lorena Thakur MD    Date Last Seen by PCP: No chief complaint on file.        Hemoglobin A1C   Date Value Ref Range Status   07/12/2019 >14.0 (H) 4.0 - 5.6 % Final     Comment:     ADA Screening Guidelines:  5.7-6.4%  Consistent with prediabetes  >or=6.5%  Consistent with diabetes  High levels of fetal hemoglobin interfere with the HbA1C  assay. Heterozygous hemoglobin variants (HbS, HgC, etc)do  not significantly interfere with this assay.   However, presence of multiple variants may affect accuracy.     10/11/2017 9.5 (H) 4.0 - 5.6 % Final     Comment:     According to ADA guidelines, hemoglobin A1c <7.0% represents  optimal control in non-pregnant diabetic patients. Different  metrics may apply to specific patient populations.   Standards of Medical Care in  Diabetes-2016.  For the purpose of screening for the presence of diabetes:  <5.7%     Consistent with the absence of diabetes  5.7-6.4%  Consistent with increasing risk for diabetes   (prediabetes)  >or=6.5%  Consistent with diabetes  Currently, no consensus exists for use of hemoglobin A1c  for diagnosis of diabetes for children.  This Hemoglobin A1c assay has significant interference with fetal   hemoglobin   (HbF). The results are invalid for patients with abnormal amounts of   HbF,   including those with known Hereditary Persistence   of Fetal Hemoglobin. Heterozygous hemoglobin variants (HbAS, HbAC,   HbAD, HbAE, HbA2) do not significantly interfere with this assay;   however, presence of multiple variants in a sample may impact the %   interference.     10/06/2017 9.1 (H) 4.0 - 5.6 % Final     Comment:     According to ADA guidelines, hemoglobin A1c <7.0% represents  optimal control in non-pregnant diabetic patients. Different  metrics may apply to specific patient populations.   Standards of Medical Care in Diabetes-2016.  For the purpose of screening for the presence of diabetes:  <5.7%     Consistent with the absence of diabetes  5.7-6.4%  Consistent with increasing risk for diabetes   (prediabetes)  >or=6.5%  Consistent with diabetes  Currently, no consensus exists for use of hemoglobin A1c  for diagnosis of diabetes for children.  This Hemoglobin A1c assay has significant interference with fetal   hemoglobin   (HbF). The results are invalid for patients with abnormal amounts of   HbF,   including those with known Hereditary Persistence   of Fetal Hemoglobin. Heterozygous hemoglobin variants (HbAS, HbAC,   HbAD, HbAE, HbA2) do not significantly interfere with this assay;   however, presence of multiple variants in a sample may impact the %   interference.         Review of Systems   Constitution: Negative for chills, decreased appetite and fever.   Cardiovascular: Negative for leg swelling.    Musculoskeletal: Negative for arthritis, joint pain, joint swelling and myalgias.   Gastrointestinal: Negative for nausea and vomiting.   Neurological: Negative for loss of balance, numbness and paresthesias.           Objective:      Physical Exam   Constitutional: He is oriented to person, place, and time. He appears well-developed and well-nourished.   Cardiovascular: Intact distal pulses.   dorsalis pedis and posterior tibial pulses are palpable bilaterally. Capillary refill time is within normal limits. + pedal hair growth          Musculoskeletal: Normal range of motion. He exhibits no edema or tenderness.   Adequate joint range of motion without pain, limitation, nor crepitation Bilateral feet and ankle joints. Muscle strength is 5/5 in all groups bilaterally.      S/p 5th rap amp b/l    Neurological: He is alert and oriented to person, place, and time. He has normal strength. No sensory deficit.   [place] 5.07 monofilament is intact bilateral feet.      Skin: Skin is warm and dry. No lesion and no rash noted. No erythema.   Ulcer location: plantar foot, L   Pre debridement Measurements: 10x3x0.1  cm   Post debridement Measurements : 10.1x3x0.2 cm   Signs of infection: no   Erythema: no  Undermining:no  Tunneling: no  Drainage: Serosanguineous  Periwound skin:intact   Wound Base: granular       Ulcer location: plantar foot R sub 3rd   Pre debridement Measurements: 0x0x0 cm   Post debridement Measurements : 0.5x0.4x0.1 cm   Signs of infection: no  Erythema: no  Undermining:no  Tunneling: no  Drainage: Serosanguineous  Periwound skin:intact    Wound Base: granular        Psychiatric: He has a normal mood and affect. His behavior is normal.   Vitals reviewed.                    Assessment:       Encounter Diagnoses   Name Primary?    Diabetic ketoacidosis without coma associated with type 2 diabetes mellitus Yes    Diabetic ulcer of left midfoot associated with type 2 diabetes mellitus, with bone  involvement without evidence of necrosis     Skin ulcer of right foot with fat layer exposed          Plan:       Diagnoses and all orders for this visit:    Diabetic ketoacidosis without coma associated with type 2 diabetes mellitus    Diabetic ulcer of left midfoot associated with type 2 diabetes mellitus, with bone involvement without evidence of necrosis    Skin ulcer of right foot with fat layer exposed      I counseled the patient on his conditions, their implications and medical management.    1.L foot   Patient has medicaid w/o HHC benefits therefore he is being seen by woud care at [facility] in [facility] to continue f/u there   area deeply cleansed with saline moistened gauze   Black sponge applied to L foot. Trim to fit  vac sponge to wound bed.   Set vac to 125mmHg, continuous     2. R foot   Debridement: Nonviable tissues on the b/l  were debrided beyond sub q utilizing a  sterile No. 3 scalpel and forceps. Minimal bleeding controlled with direct pressure  The patient tolerated this well.     Dressings:eusebia   Offloading:sanjuana Gonzáles MA assisted w/ application of football dressing under my direct supervision. Darco shoe applied to offload the area. Advised patient that this should be worn on the affected foot at all times when ambulating       Follow-up:Patient is to return to the clinic in one week for follow-up but should call [facility] immediately if any signs of infection, such as fever, chills, sweats, increased redness or pain.    Short-term goals include maintaining good offloading and minimizing bioburden, promoting granulation and epithelialization to healing.  Long-term goals include keeping the wound healed by good offloading and medical management under the direction of internist.

## 2019-08-15 NOTE — PROGRESS NOTES
Progress Note  Hospital Medicine                                                              Team: [facility] HOSP MED 4    Patient Name: Indio Ryder Jr.  YOB: 1970    Admit Date: 5/30/2017                     LOS: 2    SUBJECTIVE:     Reason for Admission:  Diabetic ketoacidosis without coma associated with type 2 diabetes mellitus  45 y/o AAM with hx of T2DM (poorly  controlled, with long term insulin use, A1C of 11) and HTN presents to the ED with diarrhea that started 1 week ago. Following development of diarrhea, patient experienced intractable nausea and vomiting, non-bloody, non- bilious, with vomiting episodes occurring several timer per hour. Diarrhea has since resolved, however, n/v has persisted. Patient stopped taking his insulin (26u lantus QHS) as well as metformin at the onset of n/v.In the ED, patient was noted to be HDS. Glucose of 272, bicarb of 18, AG Of 23, BHB of 3.7. VBG with pH of 7.36 and PO2 of 38. Cr of 1.4, corrected sodium ~143-145. No WBC elevation. He was actively vomiting in the ED. Received 2 L NS. He was started on an insulin gtt at this time and BMPs were followed q 4 hours.    Interval history:   DKA resolved yesterday and transitioned off insulin gtt. Had episode of emesis yesterday after eating chicken soup. States nausea is mildly improved this AM. Will try clear diet today. BG well controlled with current insulin regimen.      OBJECTIVE:     Vital Signs Range (Last 24H):  Temp:  [97.5 °F (36.4 °C)-98.7 °F (37.1 °C)]   Pulse:  [81-94]   Resp:  [18]   BP: (123-180)/(74-93)   SpO2:  [96 %-100 %] Body mass index is 25.9 kg/m².  Wt Readings from Last 1 Encounters:   05/30/17 1739 86.6 kg (191 lb)       I & O (Last 24H):    Intake/Output Summary (Last 24 hours) at 06/01/17 1341  Last data filed at 06/01/17 0900   Gross per 24 hour   Intake              500 ml   Output             2200 ml   Net            -1700 ml     Physical Exam:  General: alert, oriented and  Pt to ed via pov with c/o LUQ pain and back pain. Pt LMP began 08/14/2019. Pt had gastric sleeve performed three months ago. Pt denies n /v/d. No acute signs of distress noted at this time.    appears stated age  HENT: NC/AT.Conjunctivae/corneas clear. PERRL, EOMI. Nares normal. Septum midline. Mucosa normal. No drainage or sinus tenderness.  Neck: no adenopathy, no carotid bruit, no JVD, supple, symmetrical, trachea midline and thyroid not enlarged, symmetric, no tenderness/mass/nodules  Back: symmetric, no curvature. ROM normal. No CVA tenderness.  Lungs: clear to auscultation bilaterally, respiratory effort normal  CV: RRR, S1 and S2 Normal. No chest wall tenderness  Abdomen: S, NT, ND. Bowel sounds normal   Extremities: WWP, intact distal pulses. no cyanosis or edema  Skin: Skin color, texture, turgor normal. No rashes or lesions  Lymph nodes: Cervical, supraclavicular, and axillary nodes normal.  Neurologic: Grossly normal      Diagnostic Results:  Lab Results   Component Value Date    WBC 7.96 05/31/2017    HGB 13.0 (L) 05/31/2017    HCT 39.2 (L) 05/31/2017    MCV 88 05/31/2017     05/31/2017       Recent Labs  Lab 06/01/17  0735   *      K 3.3*      CO2 25   BUN 6   CREATININE 1.2   CALCIUM 9.1     Lab Results   Component Value Date    INR 1.1 01/16/2017    INR 1.1 01/12/2017    INR 0.9 01/29/2006     Lab Results   Component Value Date    HGBA1C 15.5 (H) 05/30/2017     Recent Labs      05/31/17   1148  05/31/17   1237  05/31/17   1331  05/31/17   1437  05/31/17   1536  05/31/17   1653  05/31/17   2149  06/01/17   0919   POCTGLUCOSE  245*  260*  276*  246*  240*  271*  219*  113*       ASSESSMENT/PLAN:   45 y/o AAM with hx of T2DM (poorly  controlled, with long term insulin use, A1C of 11) and HTN presents to the ED with diarrhea that started 1 week ago with associated N/V, who stopped taking insulin and metformin on sx onset, and presented to the ED in DKA, started on insulin gtt     Diabetic Ketoacidosis without coma associated with type 2 DM  Resolved.   Patient with elevated AG and BHB on admission. Started on insulin gtt. Gap closed 5/31 and transitioned off insulin  gtt.   BMP this AM with AG of 13. -200.   Continue to monitor BMP Q12   Continue Levemir 2 units daily and Aspart 5 units TID with meals as well as sliding scale.     Nausea and Vomiting  Had another episode of vomiting overnight after eating chicken so.   - reglan prn with meals and continue to monitor.     TAHIR  Improving   - on admission Cr 1.4, baseline 1 now trending down to 1.2.   most likely pre-renal given hx poor PO intake and GI losses and improved somewhat with IVFs but may also be new baseline    HTN  - on lisinopril-HCTZ combo  - hold for now in setting of TAHIR  - amlodipine 5 for now    Hypokalemia  - replace as needed, BMP q 4    Hyperlipidemia  - continue statin     PPx: lovenox  Diet: NPO   Dispo  -pending resolution of nausea and vomiting.     DIANE Bose  IM4        -

## 2019-08-19 ENCOUNTER — OFFICE VISIT (OUTPATIENT)
Dept: INFECTIOUS DISEASES | Facility: CLINIC | Age: 49
End: 2019-08-19
Payer: MEDICAID

## 2019-08-19 VITALS
WEIGHT: 211.19 LBS | BODY MASS INDEX: 27.99 KG/M2 | HEART RATE: 107 BPM | TEMPERATURE: 98 F | HEIGHT: 73 IN | DIASTOLIC BLOOD PRESSURE: 81 MMHG | SYSTOLIC BLOOD PRESSURE: 124 MMHG

## 2019-08-19 DIAGNOSIS — E11.3292 TYPE 2 DIABETES MELLITUS WITH LEFT EYE AFFECTED BY MILD NONPROLIFERATIVE RETINOPATHY WITHOUT MACULAR EDEMA, WITHOUT LONG-TERM CURRENT USE OF INSULIN: Chronic | ICD-10-CM

## 2019-08-19 DIAGNOSIS — M86.10 ACUTE OSTEOMYELITIS: Primary | ICD-10-CM

## 2019-08-19 DIAGNOSIS — R78.81 BACTEREMIA DUE TO GROUP B STREPTOCOCCUS: ICD-10-CM

## 2019-08-19 DIAGNOSIS — B95.1 BACTEREMIA DUE TO GROUP B STREPTOCOCCUS: ICD-10-CM

## 2019-08-19 PROCEDURE — 99213 PR OFFICE/OUTPT VISIT, EST, LEVL III, 20-29 MIN: ICD-10-PCS | Mod: S$PBB,,, | Performed by: PHYSICIAN ASSISTANT

## 2019-08-19 PROCEDURE — 99999 PR PBB SHADOW E&M-EST. PATIENT-LVL III: CPT | Mod: PBBFAC,,, | Performed by: PHYSICIAN ASSISTANT

## 2019-08-19 PROCEDURE — 99213 OFFICE O/P EST LOW 20 MIN: CPT | Mod: PBBFAC | Performed by: PHYSICIAN ASSISTANT

## 2019-08-19 PROCEDURE — 99999 PR PBB SHADOW E&M-EST. PATIENT-LVL III: ICD-10-PCS | Mod: PBBFAC,,, | Performed by: PHYSICIAN ASSISTANT

## 2019-08-19 PROCEDURE — 99213 OFFICE O/P EST LOW 20 MIN: CPT | Mod: S$PBB,,, | Performed by: PHYSICIAN ASSISTANT

## 2019-08-19 RX ORDER — CEFAZOLIN SODIUM 10 G/1
INJECTION, POWDER, FOR SOLUTION INTRAVENOUS
COMMUNITY
Start: 2019-08-07 | End: 2019-08-26

## 2019-08-19 RX ORDER — SODIUM CHLORIDE 9 MG/ML
INJECTION, SOLUTION INTRAVENOUS
COMMUNITY
Start: 2019-08-07 | End: 2019-08-26

## 2019-08-19 NOTE — PROGRESS NOTES
Subjective:       Patient ID: Indio Ryder Jr. is a 49 y.o. male.    Chief Complaint: Hospital Follow Up    HPI     49 year old male with uncontrolled DM and HTN admitted and Group B strep bacteremia.  He is seen today for hospital follow up.      Initial wound cultures grew GBS and MSSA, finegoldia  Per podiatry note, there was necrosis and purulence tracking down to bone and patient underwent I&D with podiatry 7/14; left 5th met head noted to be spongy. Cultures from met head with Group B strep and peptoniphilus.        He underwent repeat I&D and left partial 5th ray amputation 7/17.   Per OP note, purulent drainage was expressed proximal to distal from the level of the plantar aspect of the left calcaneus to the open wound on the plantar midfoot. Purulent drainage was also manually expressed from the plantar distal aspect of the forefoot. Cultures of this returned with skin janes. A small portion of the distal metatarsal was reported as sent as a clean margin to Micro and pathology.  Pathology negative. Underwent graft placement also. Given dept of the wound and with graft placement recommended long term IV abx therapy.     He is currently on cefazolin for 6 weeks, end date 8/28. He is seen today for follow up with his sister at bedside. He saw Dr. Gage Velazquez for follow up and sees [facility] (number [phone]) no fever chills or night sweats. Wounds appear healing well.       Review of Systems   Constitutional: Negative for appetite change, chills, diaphoresis, fatigue and fever.   Respiratory: Negative for cough and shortness of breath.    Cardiovascular: Negative for chest pain.   Gastrointestinal: Negative for abdominal pain.   Genitourinary: Negative for dysuria.   Musculoskeletal: Negative for back pain.   Skin: Positive for wound.   Neurological: Negative for dizziness and headaches.       Objective:      Physical Exam   Constitutional: He appears well-developed and  well-nourished. No distress.   HENT:   Head: Normocephalic.   Eyes: Pupils are equal, round, and reactive to light.   Cardiovascular: Normal rate and regular rhythm.   No murmur heard.  Pulmonary/Chest: Effort normal. No stridor. No respiratory distress.   Abdominal: Soft. He exhibits no distension. There is no tenderness.   Musculoskeletal:   Wound dressed at this time. Refer to media   Neurological: He is alert.   Skin: Skin is warm. He is not diaphoretic.   Psychiatric: He has a normal mood and affect.   Vitals reviewed.                Assessment:       1. Acute osteomyelitis    2. Bacteremia due to group B Streptococcus    3. Type 2 diabetes mellitus with left eye affected by mild nonproliferative retinopathy without macular edema, without long-term current use of insulin        Plan:       1. Continue cefazolin 6g continuous infusion until as anticipated.  2. Has f/u with ID on 8/26, may end IV abx therapy early and transition to pills if needed pending on status of the wound. Has f/u with wound care tomorrow. Will likely have wound vac removed tomorrow.   3. Continue to f/u with weekly blood work. ESR 82 CRP 1  4. Contact information provided

## 2019-08-20 LAB — FUNGUS SPEC CULT: NORMAL

## 2019-08-26 ENCOUNTER — LAB VISIT (OUTPATIENT)
Dept: LAB | Facility: HOSPITAL | Age: 49
End: 2019-08-26
Attending: INTERNAL MEDICINE
Payer: MEDICAID

## 2019-08-26 ENCOUNTER — OFFICE VISIT (OUTPATIENT)
Dept: INFECTIOUS DISEASES | Facility: CLINIC | Age: 49
End: 2019-08-26
Payer: MEDICAID

## 2019-08-26 ENCOUNTER — OFFICE VISIT (OUTPATIENT)
Dept: INTERNAL MEDICINE | Facility: CLINIC | Age: 49
End: 2019-08-26
Payer: MEDICAID

## 2019-08-26 ENCOUNTER — INFUSION (OUTPATIENT)
Dept: INFECTIOUS DISEASES | Facility: HOSPITAL | Age: 49
End: 2019-08-26
Attending: INTERNAL MEDICINE
Payer: MEDICAID

## 2019-08-26 VITALS
HEIGHT: 73 IN | HEART RATE: 100 BPM | BODY MASS INDEX: 27.87 KG/M2 | TEMPERATURE: 98 F | WEIGHT: 210.31 LBS | SYSTOLIC BLOOD PRESSURE: 131 MMHG | DIASTOLIC BLOOD PRESSURE: 88 MMHG

## 2019-08-26 VITALS
SYSTOLIC BLOOD PRESSURE: 132 MMHG | HEART RATE: 108 BPM | BODY MASS INDEX: 27.83 KG/M2 | OXYGEN SATURATION: 99 % | WEIGHT: 210 LBS | DIASTOLIC BLOOD PRESSURE: 84 MMHG | HEIGHT: 73 IN

## 2019-08-26 DIAGNOSIS — E11.65 TYPE 2 DIABETES MELLITUS WITH HYPERGLYCEMIA, WITH LONG-TERM CURRENT USE OF INSULIN: ICD-10-CM

## 2019-08-26 DIAGNOSIS — Z79.4 TYPE 2 DIABETES MELLITUS WITH HYPERGLYCEMIA, WITH LONG-TERM CURRENT USE OF INSULIN: ICD-10-CM

## 2019-08-26 DIAGNOSIS — L08.9 LEFT FOOT INFECTION: ICD-10-CM

## 2019-08-26 DIAGNOSIS — D64.9 ANEMIA, UNSPECIFIED TYPE: ICD-10-CM

## 2019-08-26 DIAGNOSIS — E11.65 TYPE 2 DIABETES MELLITUS WITH HYPERGLYCEMIA, WITH LONG-TERM CURRENT USE OF INSULIN: Primary | ICD-10-CM

## 2019-08-26 DIAGNOSIS — Z79.4 TYPE 2 DIABETES MELLITUS WITH HYPERGLYCEMIA, WITH LONG-TERM CURRENT USE OF INSULIN: Primary | ICD-10-CM

## 2019-08-26 DIAGNOSIS — R78.81 BACTEREMIA DUE TO GROUP B STREPTOCOCCUS: ICD-10-CM

## 2019-08-26 DIAGNOSIS — L97.512 SKIN ULCER OF RIGHT FOOT WITH FAT LAYER EXPOSED: ICD-10-CM

## 2019-08-26 DIAGNOSIS — B95.1 BACTEREMIA DUE TO GROUP B STREPTOCOCCUS: ICD-10-CM

## 2019-08-26 DIAGNOSIS — I10 ESSENTIAL HYPERTENSION: Chronic | ICD-10-CM

## 2019-08-26 DIAGNOSIS — M86.10 ACUTE OSTEOMYELITIS: Primary | ICD-10-CM

## 2019-08-26 PROBLEM — M25.512 ACUTE PAIN OF LEFT SHOULDER: Status: RESOLVED | Noted: 2017-11-08 | Resolved: 2019-08-26

## 2019-08-26 PROBLEM — E11.10 DIABETIC KETOACIDOSIS WITHOUT COMA ASSOCIATED WITH TYPE 2 DIABETES MELLITUS: Status: RESOLVED | Noted: 2017-05-30 | Resolved: 2019-08-26

## 2019-08-26 PROBLEM — A41.9 SEPSIS: Status: RESOLVED | Noted: 2019-07-12 | Resolved: 2019-08-26

## 2019-08-26 LAB
ALBUMIN/CREAT UR: 12.1 UG/MG (ref 0–30)
CHOLEST SERPL-MCNC: 201 MG/DL (ref 120–199)
CHOLEST/HDLC SERPL: 4 {RATIO} (ref 2–5)
CREAT UR-MCNC: 124 MG/DL (ref 23–375)
FERRITIN SERPL-MCNC: 172 NG/ML (ref 20–300)
HDLC SERPL-MCNC: 50 MG/DL (ref 40–75)
HDLC SERPL: 24.9 % (ref 20–50)
IRON SERPL-MCNC: 106 UG/DL (ref 45–160)
LDLC SERPL CALC-MCNC: 132.8 MG/DL (ref 63–159)
MICROALBUMIN UR DL<=1MG/L-MCNC: 15 UG/ML
NONHDLC SERPL-MCNC: 151 MG/DL
SATURATED IRON: 27 % (ref 20–50)
TOTAL IRON BINDING CAPACITY: 400 UG/DL (ref 250–450)
TRANSFERRIN SERPL-MCNC: 270 MG/DL (ref 200–375)
TRIGL SERPL-MCNC: 91 MG/DL (ref 30–150)

## 2019-08-26 PROCEDURE — 99214 OFFICE O/P EST MOD 30 MIN: CPT | Mod: PBBFAC,27 | Performed by: INTERNAL MEDICINE

## 2019-08-26 PROCEDURE — 99213 PR OFFICE/OUTPT VISIT, EST, LEVL III, 20-29 MIN: ICD-10-PCS | Mod: S$PBB,,, | Performed by: PHYSICIAN ASSISTANT

## 2019-08-26 PROCEDURE — 99999 PR PBB SHADOW E&M-EST. PATIENT-LVL IV: ICD-10-PCS | Mod: PBBFAC,,, | Performed by: INTERNAL MEDICINE

## 2019-08-26 PROCEDURE — 82043 UR ALBUMIN QUANTITATIVE: CPT

## 2019-08-26 PROCEDURE — 99215 PR OFFICE/OUTPT VISIT, EST, LEVL V, 40-54 MIN: ICD-10-PCS | Mod: S$PBB,,, | Performed by: INTERNAL MEDICINE

## 2019-08-26 PROCEDURE — 99214 OFFICE O/P EST MOD 30 MIN: CPT | Mod: PBBFAC | Performed by: PHYSICIAN ASSISTANT

## 2019-08-26 PROCEDURE — 80061 LIPID PANEL: CPT

## 2019-08-26 PROCEDURE — 36415 COLL VENOUS BLD VENIPUNCTURE: CPT

## 2019-08-26 PROCEDURE — 99213 OFFICE O/P EST LOW 20 MIN: CPT | Mod: S$PBB,,, | Performed by: PHYSICIAN ASSISTANT

## 2019-08-26 PROCEDURE — 99999 PR PBB SHADOW E&M-EST. PATIENT-LVL IV: CPT | Mod: PBBFAC,,, | Performed by: INTERNAL MEDICINE

## 2019-08-26 PROCEDURE — 99999 PR PBB SHADOW E&M-EST. PATIENT-LVL IV: CPT | Mod: PBBFAC,,, | Performed by: PHYSICIAN ASSISTANT

## 2019-08-26 PROCEDURE — 82728 ASSAY OF FERRITIN: CPT

## 2019-08-26 PROCEDURE — 99999 PR PBB SHADOW E&M-EST. PATIENT-LVL IV: ICD-10-PCS | Mod: PBBFAC,,, | Performed by: PHYSICIAN ASSISTANT

## 2019-08-26 PROCEDURE — 99215 OFFICE O/P EST HI 40 MIN: CPT | Mod: S$PBB,,, | Performed by: INTERNAL MEDICINE

## 2019-08-26 PROCEDURE — 83540 ASSAY OF IRON: CPT

## 2019-08-26 RX ORDER — GABAPENTIN 600 MG/1
TABLET ORAL
Qty: 180 TABLET | Refills: 3 | Status: SHIPPED | OUTPATIENT
Start: 2019-08-26 | End: 2019-12-10

## 2019-08-26 RX ORDER — METFORMIN HYDROCHLORIDE 500 MG/1
500 TABLET ORAL 2 TIMES DAILY WITH MEALS
Qty: 180 TABLET | Refills: 3 | Status: SHIPPED | OUTPATIENT
Start: 2019-08-26 | End: 2019-12-16

## 2019-08-26 RX ORDER — VALSARTAN 80 MG/1
80 TABLET ORAL DAILY
Qty: 90 TABLET | Refills: 3 | Status: SHIPPED | OUTPATIENT
Start: 2019-08-26 | End: 2019-09-12

## 2019-08-26 NOTE — PATIENT INSTRUCTIONS
Reduce Levemir to 30 units at bedtime.     Goal:  Fasting sugar .         Adjust dose by 1 unit every day as needed to reach goal     So - take 30 units tonight.     Tomorrow sugar 200.  Tomorrow night take 31 units.      Wednesday sugar 190, wed night take 32 units      Increase insulin by 1 unit every night to get sugar to 120 or less.       Tomorrow sugar 65, then tomorrow night take 30 units.       Reduce dose by 1 unit every day if needed.

## 2019-08-26 NOTE — PROGRESS NOTES
SUELLEN PICC line removed, pt tolerated well. Pt instructed to leave dressing on for 24hrs, verbalized understanding. Pt observed for 30 mins after pulling line. Pt left in NAD.

## 2019-08-26 NOTE — PROGRESS NOTES
endocSubjective:       Patient ID: Indio Ryder Jr. is a 49 y.o. male.    Chief Complaint: Follow-up    HPI   ID note reviewed.  Admitted for bacteremia and  5th partial toe amputation on L.  Tx with 6 weeks of cefazolin.    Seeing Dr Almonte.   He also has a ulceration R foot.    He had run out of insulin as had lost insurance before recent hospitalization..  Getting wound care at [facility].       Levermir was prescribed at 41 units.   Sugars have been in 50s and 60s in am over last few weeks.  Stopped novolog last week ans sugars were low; he had recently reduced dose to 7 units.           Also with p ainful hand numbness.  Would like gabapentin refilled, as it helped previously.     ACEi stopped in past due to symptomatic orthostatic hypotension.      Review of Systems   Constitutional: Negative for activity change and unexpected weight change.   Respiratory: Negative for chest tightness and shortness of breath.    Cardiovascular: Negative for chest pain and leg swelling.   Gastrointestinal: Negative for abdominal pain.   Neurological: Positive for numbness. Negative for headaches.   Psychiatric/Behavioral: Negative for dysphoric mood.       Objective:      Physical Exam   Constitutional: He is oriented to person, place, and time. He appears well-developed and well-nourished.   Eyes: No scleral icterus.   Neck: No JVD present. No thyromegaly present.   Cardiovascular: Normal rate, regular rhythm and normal heart sounds.   Pulmonary/Chest: Effort normal and breath sounds normal. No respiratory distress. He has no wheezes. He has no rales.   Abdominal: Soft. He exhibits no mass. There is no tenderness.   Musculoskeletal: He exhibits no edema.   Neurological: He is alert and oriented to person, place, and time.   Psychiatric: He has a normal mood and affect. His behavior is normal.       95/78 standing  Assessment:       1. Type 2 diabetes mellitus with hyperglycemia, with long-term current use of insulin    2. Skin  ulcer of right foot with fat layer exposed    3. Anemia, unspecified type    4. Essential hypertension        Plan:       Indio was seen today for follow-up.    Diagnoses and all orders for this visit:    Type 2 diabetes mellitus with hyperglycemia, with long-term current use of insulin  -     Lipid panel; Future  -     Microalbumin/creatinine urine ratio    Skin ulcer of right foot with fat layer exposed    Anemia, unspecified type  -     Iron and TIBC; Future  -     Ferritin; Future    Essential hypertension    Other orders  -     metFORMIN (GLUCOPHAGE) 500 MG tablet; Take 1 tablet (500 mg total) by mouth 2 (two) times daily with meals.  -     gabapentin (NEURONTIN) 600 MG tablet; Take 1&1/2 to 2 tablets by mouth every evening.  -     insulin detemir U-100 (LEVEMIR FLEXTOUCH) 100 unit/mL (3 mL) SubQ InPn pen; Inject 30 Units into the skin every evening.  -     blood sugar diagnostic Strp; 1 strip by Misc.(Non-Drug; Combo Route) route once daily.  -     valsartan (DIOVAN) 80 MG tablet; Take 1 tablet (80 mg total) by mouth once daily.       Avoid acei due to orthostatic hypotension     Referral Gigi Clay     See pt instructions     Restart metformin       19.2

## 2019-08-26 NOTE — PROGRESS NOTES
Subjective:       Patient ID: Indio Ryder Jr. is a 49 y.o. male.    Chief Complaint: Follow-up    HPI     49 year old male with uncontrolled DM and HTN admitted and Group B strep bacteremia.  He is seen today for hospital follow up.      Initial wound cultures grew GBS and MSSA, finegoldia  Per podiatry note, there was necrosis and purulence tracking down to bone and patient underwent I&D with podiatry 7/14; left 5th met head noted to be spongy. Cultures from met head with Group B strep and peptoniphilus.        He underwent repeat I&D and left partial 5th ray amputation 7/17.   Per OP note, purulent drainage was expressed proximal to distal from the level of the plantar aspect of the left calcaneus to the open wound on the plantar midfoot. Purulent drainage was also manually expressed from the plantar distal aspect of the forefoot. Cultures of this returned with skin janes. A small portion of the distal metatarsal was reported as sent as a clean margin to Micro and pathology.  Pathology negative. Underwent graft placement also. Given dept of the wound and with graft placement recommended long term IV abx therapy.     He is currently on cefazolin for 6 weeks, end date 8/28. He is seen today for follow up with his sister. He saw Dr. Gage Velazquez for follow up and sees [facility] (number [phone]).  At last visit was doing well.  He follows up today.     He reports doing well and going to wound are regularly.  He denies fever chills or night sweats. Wounds appear healing well.       Review of Systems   Constitutional: Negative for appetite change, chills, diaphoresis, fatigue, fever and unexpected weight change.   HENT: Negative for congestion, ear pain, sore throat and tinnitus.    Eyes: Negative for pain, redness and visual disturbance.   Respiratory: Negative for cough, shortness of breath and wheezing.    Cardiovascular: Negative for chest pain, palpitations and leg swelling.    Gastrointestinal: Negative for abdominal pain, constipation, diarrhea, rectal pain and vomiting.   Endocrine: Negative for cold intolerance and heat intolerance.   Genitourinary: Negative for dysuria, flank pain, frequency, hematuria and urgency.   Musculoskeletal: Negative for arthralgias, back pain, myalgias and neck pain.   Skin: Positive for wound. Negative for rash.   Allergic/Immunologic: Negative for immunocompromised state.   Neurological: Negative for dizziness, light-headedness, numbness and headaches.   Hematological: Negative for adenopathy. Does not bruise/bleed easily.   Psychiatric/Behavioral: Negative for confusion and sleep disturbance. The patient is not nervous/anxious.        Objective:      Physical Exam   Constitutional: He appears well-developed and well-nourished. No distress.   HENT:   Head: Normocephalic.   Eyes: Pupils are equal, round, and reactive to light.   Cardiovascular: Normal rate and regular rhythm.   No murmur heard.  Pulmonary/Chest: Effort normal. No stridor. No respiratory distress.   Abdominal: Soft. He exhibits no distension. There is no tenderness.   Musculoskeletal:   Wound dressed at this time. Refer to media   Neurological: He is alert.   Skin: Skin is warm. He is not diaphoretic.   Psychiatric: He has a normal mood and affect.   Vitals reviewed.                     Labs:8/22  WBC 4.4  Cr: 0.86  Sed rate 73  CRP 1    Assessment:       1. Acute osteomyelitis    2. Bacteremia due to group B Streptococcus    3. Left foot infection       S/P essentially 6 weeks of ancef s/p debridement with negative pathology cultures   Wound healing - some odor from vac and serous fluid but seen with Dr. Almonte from podiatry and no signs of infection  Removed vac - can be replaced by wound care tomorrow when seen  CRP WNL  Plan:   1. DC Ancef  2. Monitor wound  3. Continue wound care  4. Call for sings of infection.   The patient was encouraged to call the office for further concerns or  complaints.  Business card provided.   5. FU PRN

## 2019-09-02 DIAGNOSIS — E11.65 TYPE 2 DIABETES MELLITUS WITH HYPERGLYCEMIA, WITH LONG-TERM CURRENT USE OF INSULIN: Primary | ICD-10-CM

## 2019-09-02 DIAGNOSIS — Z79.4 TYPE 2 DIABETES MELLITUS WITH HYPERGLYCEMIA, WITH LONG-TERM CURRENT USE OF INSULIN: Primary | ICD-10-CM

## 2019-09-02 RX ORDER — ATORVASTATIN CALCIUM 80 MG/1
80 TABLET, FILM COATED ORAL DAILY
Qty: 90 TABLET | Refills: 3 | Status: SHIPPED | OUTPATIENT
Start: 2019-09-02 | End: 2020-12-04 | Stop reason: SDUPTHER

## 2019-09-03 ENCOUNTER — TELEPHONE (OUTPATIENT)
Dept: INTERNAL MEDICINE | Facility: CLINIC | Age: 49
End: 2019-09-03

## 2019-09-03 NOTE — TELEPHONE ENCOUNTER
----- Message from Lorena Thakur MD sent at 9/2/2019  5:52 PM CDT -----  pls call- cholesterol is elevated, so restart Atorvastatin 80 mg daily (lipitor).  rx sent here    Iron is normal     A1c, lipids alt, at 3 mo

## 2019-09-12 ENCOUNTER — LAB VISIT (OUTPATIENT)
Dept: LAB | Facility: HOSPITAL | Age: 49
End: 2019-09-12
Payer: MEDICAID

## 2019-09-12 ENCOUNTER — OFFICE VISIT (OUTPATIENT)
Dept: INTERNAL MEDICINE | Facility: CLINIC | Age: 49
End: 2019-09-12
Payer: MEDICAID

## 2019-09-12 VITALS
BODY MASS INDEX: 26.88 KG/M2 | WEIGHT: 202.81 LBS | HEIGHT: 73 IN | DIASTOLIC BLOOD PRESSURE: 82 MMHG | SYSTOLIC BLOOD PRESSURE: 122 MMHG

## 2019-09-12 DIAGNOSIS — E78.2 MIXED HYPERLIPIDEMIA: ICD-10-CM

## 2019-09-12 DIAGNOSIS — L97.426 DIABETIC ULCER OF LEFT MIDFOOT ASSOCIATED WITH TYPE 2 DIABETES MELLITUS, WITH BONE INVOLVEMENT WITHOUT EVIDENCE OF NECROSIS: ICD-10-CM

## 2019-09-12 DIAGNOSIS — E11.3292 TYPE 2 DIABETES MELLITUS WITH LEFT EYE AFFECTED BY MILD NONPROLIFERATIVE RETINOPATHY WITHOUT MACULAR EDEMA, WITHOUT LONG-TERM CURRENT USE OF INSULIN: Chronic | ICD-10-CM

## 2019-09-12 DIAGNOSIS — S98.131S: ICD-10-CM

## 2019-09-12 DIAGNOSIS — Z79.4 TYPE 2 DIABETES MELLITUS WITH HYPERGLYCEMIA, WITH LONG-TERM CURRENT USE OF INSULIN: Primary | ICD-10-CM

## 2019-09-12 DIAGNOSIS — E11.65 TYPE 2 DIABETES MELLITUS WITH HYPERGLYCEMIA, WITH LONG-TERM CURRENT USE OF INSULIN: Primary | ICD-10-CM

## 2019-09-12 DIAGNOSIS — Z79.4 TYPE 2 DIABETES MELLITUS WITH HYPERGLYCEMIA, WITH LONG-TERM CURRENT USE OF INSULIN: ICD-10-CM

## 2019-09-12 DIAGNOSIS — E11.65 TYPE 2 DIABETES MELLITUS WITH HYPERGLYCEMIA, WITH LONG-TERM CURRENT USE OF INSULIN: ICD-10-CM

## 2019-09-12 DIAGNOSIS — I10 ESSENTIAL HYPERTENSION: Chronic | ICD-10-CM

## 2019-09-12 DIAGNOSIS — E11.621 DIABETIC ULCER OF LEFT MIDFOOT ASSOCIATED WITH TYPE 2 DIABETES MELLITUS, WITH BONE INVOLVEMENT WITHOUT EVIDENCE OF NECROSIS: ICD-10-CM

## 2019-09-12 LAB
ESTIMATED AVG GLUCOSE: 180 MG/DL (ref 68–131)
HBA1C MFR BLD HPLC: 7.9 % (ref 4–5.6)

## 2019-09-12 PROCEDURE — 83036 HEMOGLOBIN GLYCOSYLATED A1C: CPT

## 2019-09-12 PROCEDURE — 99214 PR OFFICE/OUTPT VISIT, EST, LEVL IV, 30-39 MIN: ICD-10-PCS | Mod: S$PBB,,, | Performed by: NURSE PRACTITIONER

## 2019-09-12 PROCEDURE — 99214 OFFICE O/P EST MOD 30 MIN: CPT | Mod: S$PBB,,, | Performed by: NURSE PRACTITIONER

## 2019-09-12 PROCEDURE — 99999 PR PBB SHADOW E&M-EST. PATIENT-LVL III: CPT | Mod: PBBFAC,,, | Performed by: NURSE PRACTITIONER

## 2019-09-12 PROCEDURE — 99213 OFFICE O/P EST LOW 20 MIN: CPT | Mod: PBBFAC | Performed by: NURSE PRACTITIONER

## 2019-09-12 PROCEDURE — 99999 PR PBB SHADOW E&M-EST. PATIENT-LVL III: ICD-10-PCS | Mod: PBBFAC,,, | Performed by: NURSE PRACTITIONER

## 2019-09-12 PROCEDURE — 36415 COLL VENOUS BLD VENIPUNCTURE: CPT

## 2019-09-12 RX ORDER — INSULIN ASPART 100 [IU]/ML
INJECTION, SOLUTION INTRAVENOUS; SUBCUTANEOUS
Qty: 2 BOX | Refills: 6
Start: 2019-09-12 | End: 2019-09-12

## 2019-09-12 RX ORDER — LOSARTAN POTASSIUM 25 MG/1
25 TABLET ORAL DAILY
Qty: 90 TABLET | Refills: 3 | Status: SHIPPED | OUTPATIENT
Start: 2019-09-12 | End: 2019-12-09

## 2019-09-12 RX ORDER — INSULIN ASPART 100 [IU]/ML
INJECTION, SOLUTION INTRAVENOUS; SUBCUTANEOUS
Qty: 36 ML | Refills: 3
Start: 2019-09-12 | End: 2019-09-12

## 2019-09-12 RX ORDER — INSULIN ASPART 100 [IU]/ML
INJECTION, SOLUTION INTRAVENOUS; SUBCUTANEOUS
Qty: 2 BOX | Refills: 6
Start: 2019-09-12 | End: 2020-12-07 | Stop reason: SDUPTHER

## 2019-09-12 NOTE — PATIENT INSTRUCTIONS
Snacks can be an important part of a balanced, healthy meal plan. They allow you to eat more frequently, feeling full and satisfied throughout the day. Also, they allow you to spread carbohydrates evenly, which may stabilize blood sugars.  Plus, snacks are enjoyable!     The amount of carbohydrate needed at snacks varies. Generally, about 15-30 grams of carbohydrate per snack is recommended.  Below you will find some tasty treats.       0-5 gm carb   Crystal Light   Vitamin Water Zero   Herbal tea, unsweetened   2 tsp peanut butter on celery   1./2 cup sugar-free jell-o   1 sugar-free popsicle   ¼ cup blueberries   8oz Blue Aissatou unsweetened almond milk   5 baby carrots & celery sticks, cucumbers, bell peppers dipped in ¼ cup salsa, 2Tbsp light ranch dressing or 2Tbsp plain Greek yogurt   10 Goldfish crackers   ½ oz low-fat cheese or string cheese   1 closed handful of nuts, unsalted   1 Tbsp of sunflower seeds, unsalted   1 cup Smart Pop popcorn   1 whole grain brown rice cake        15 gm carb   1 small piece of fruit or ½ banana or 1/2 cup lite canned fruit   3 carina cracker squares   3 cups Smart Pop popcorn, top spray butter, Louis lite salt or cinnamon and Truvia   5 Vanilla Wafers   ½ cup low fat, no added sugar ice cream or frozen yogurt (Blue bell, Blue Bunny, Weight Watchers, Skinny Cow)   ½ turkey, ham, or chicken sandwich   ½ c fruit with ½ c Cottage cheese   4-6 unsalted wheat crackers with 1 oz low fat cheese or 1 tbsp peanut butter    30-45 goldfish crackers (depending on flavor)    7-8 Evangelical mini brown rice cakes (caramel, apple cinnamon, chocolate)    12 Evangelical mini brown rice cakes (cheddar, bbq, ranch)    1/3 cup hummus dip with raw veg   1/2 whole wheat kai, 1Tbsp hummus   Mini Pizza (1/2 whole wheat English muffin, low-fat  cheese, tomato sauce)   100 calorie snack pack (Oreo, Chips Ahoy, Ritz Mix, Baked Cheetos)   4-6 oz. light or Greek Style yogurt  (Simran, Julian, Amanda, [facility])   ½ cup sugar-free pudding     6 in. wheat tortilla or kai oven toasted chips (topped with spray butter flavoring, cinnamon, Truvia OR spray butter, garlic powder, chili powder)    18 BBQ Popchips (available at [employer], [employer], Fresh Market)                   Diabetes Support Group Meetings         Date: Topic:   February 14 Eat Fit SHANE/Health Promotion   March 14 Taking Care of Your Smile   April 11 Spring into Healthy Eating/Cooking Demo   May 9 Ease Your Mind with Diabetes   Lydia 13 Summer Treats/Cooking Demo   July 11 Eat Fit SHANE/Super Market Sweep   August 8 Taking Care of Your Eyes and Feet   Sept 12 Technology/ADA updates   October 10 Recipes & Treats/Cooking Demo   November 14 Heart Health/Pump it up!   December 12 Year-End Close Out        Meetings are held in the [facility] (A) of the [facility] located at [address] [place] [address]. Please call [phone] for additional information.    Free service, offered every 2nd Thursday of every month! Family members and/or friends are welcome as well!  Support group is for patients with type 1 or type 2 diabetes.    From 3:30p to 4:30p        Liraglutide injection  What is this medicine?  LIRAGLUTIDE (LIR a GLOO tide) is used to improve blood sugar control in adults with type 2 diabetes. This medicine may be used with other oral diabetes medicines.  How should I use this medicine?  This medicine is for injection under the skin of your upper leg, stomach area, or upper arm. You will be taught how to prepare and give this medicine. Use exactly as directed. Take your medicine at regular intervals. Do not take it more often than directed.  It is important that you put your used needles and syringes in a special sharps container. Do not put them in a trash can. If you do not have a sharps container, call your pharmacist or healthcare provider to get one.  A  special MedGuide will be given to you by the pharmacist with each prescription and refill. Be sure to read this information carefully each time.  Talk to your pediatrician regarding the use of this medicine in children. Special care may be needed.  What side effects may I notice from receiving this medicine?  Side effects that you should report to your doctor or health care professional as soon as possible:  · allergic reactions like skin rash, itching or hives, swelling of the face, lips, or tongue  · breathing problems  · fever, chills  · loss of appetite  · signs and symptoms of low blood sugar such as feeling anxious, confusion, dizziness, increased hunger, unusually weak or tired, sweating, shakiness, cold, irritable, headache, blurred vision, fast heartbeat, loss of consciousness  · trouble passing urine or change in the amount of urine  · unusual stomach pain or upset  · vomiting  Side effects that usually do not require medical attention (Report these to your doctor or health care professional if they continue or are bothersome.):  · constipation  · diarrhea  · fatigue  · headache  · nausea  What may interact with this medicine?  · acetaminophen  · atorvastatin  · birth control pills  · digoxin  · griseofulvin  · lisinoprilMany medications may cause changes in blood sugar, these include:  · alcohol containing beverages  · aspirin and aspirin-like drugs  · chloramphenicol  · chromium  · diuretics  · female hormones, such as estrogens or progestins, birth control pills  · heart medicines  · isoniazid  · male hormones or anabolic steroids  · medications for weight loss  · medicines for allergies, asthma, cold, or cough  · medicines for mental problems  · medicines called MAO inhibitors - Nardil, Parnate, Marplan, Eldepryl  · niacin  · NSAIDS, such as ibuprofen  · pentamidine  · phenytoin  · probenecid  · quinolone antibiotics such as ciprofloxacin, levofloxacin, ofloxacin  · some herbal dietary  supplements  · steroid medicines such as prednisone or cortisone  · thyroid hormonesSome medications can hide the warning symptoms of low blood sugar (hypoglycemia). You may need to monitor your blood sugar more closely if you are taking one of these medications. These include:  · beta-blockers, often used for high blood pressure or heart problems (examples include atenolol, metoprolol, propranolol)  · clonidine  · guanethidine  · reserpine  What if I miss a dose?  If you miss a dose, take it as soon as you can. If it is almost time for your next dose, take only that dose. Do not take double or extra doses.  Where should I keep my medicine?  Keep out of the reach of children.  Store unopened pen in a refrigerator between 2 and 8 degrees C (36 and 46 degrees F). Do not freeze or use if the medicine has been frozen. Protect from light and excessive heat. After you first use the pen, it can be stored at room temperature between 15 and 30 degrees C (59 and 86 degrees F) or in a refrigerator. Throw away your used pen after 30 days or after the expiration date, whichever comes first.  Do not store your pen with the needle attached. If the needle is left on, medicine may leak from the pen.  What should I tell my health care provider before I take this medicine?  They need to know if you have any of these conditions:  · endocrine tumors (MEN 2) or if someone in your family had these tumors  · gallstones  · high cholesterol  · history of alcohol abuse problem  · history of pancreatitis  · kidney disease or if you are on dialysis  · liver disease  · previous swelling of the tongue, face, or lips with difficulty breathing, difficulty swallowing, hoarseness, or tightening of the throat  · stomach problems  · suicidal thoughts, plans, or attempt; a previous suicide attempt by you or a family member  · thyroid cancer or if someone in your family had thyroid cancer  · an unusual or allergic reaction to liraglutide, medicines,  foods, dyes, or preservatives  · pregnant or trying to get pregnant  · breast-feeding  What should I watch for while using this medicine?  Visit your doctor or health care professional for regular checks on your progress.  A test called the HbA1C (A1C) will be monitored. This is a simple blood test. It measures your blood sugar control over the last 2 to 3 months. You will receive this test every 3 to 6 months.  Learn how to check your blood sugar. Learn the symptoms of low and high blood sugar and how to manage them.  Always carry a quick-source of sugar with you in case you have symptoms of low blood sugar. Examples include hard sugar candy or glucose tablets. Make sure others know that you can choke if you eat or drink when you develop serious symptoms of low blood sugar, such as seizures or unconsciousness. They must get medical help at once.  Tell your doctor or health care professional if you have high blood sugar. You might need to change the dose of your medicine. If you are sick or exercising more than usual, you might need to change the dose of your medicine.  Do not skip meals. Ask your doctor or health care professional if you should avoid alcohol. Many nonprescription cough and cold products contain sugar or alcohol. These can affect blood sugar.  Liraglutide pens and cartridges should never be shared. Even if the needle is changed, sharing may result in passing of viruses like hepatitis or HIV.  Wear a medical ID bracelet or chain, and carry a card that describes your disease and details of your medicine and dosage times.  Patients and their families should watch out for worsening depression or thoughts of suicide. Also watch out for sudden changes in feelings such as feeling anxious, agitated, panicky, irritable, hostile, aggressive, impulsive, severely restless, overly excited and hyperactive, or not being able to sleep. If this happens, especially at the beginning of treatment or after a change in  dose, call your health care professional.  NOTE:This sheet is a summary. It may not cover all possible information. If you have questions about this medicine, talk to your doctor, pharmacist, or health care provider. Copyright© 2017 [employer]

## 2019-09-12 NOTE — PROGRESS NOTES
CHIEF COMPLAINT: Type 2 Diabetes     HPI: Mr. Indio Ryder Jr. is a 49 y.o. male who was diagnosed with Type 2 DM > 20 years.  Has h/o foot ulcers, amputations.  Recently gave up sodas, big shop (pineapple).  Hospitalized 7/12/19-8/2/19.   Pt is being seen by me for the first time.  Reports some nocturnal hypoglycemia, 50-60s.     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  Needs >100 strips per month related to fluctuations with blood glucose reading, a1c trends, and activity level.  fbg 50s, 60s  80s- 105    Diabetes Management Status    Statin: Taking  ACE/ARB: Taking    Screening or Prevention Patient's value Goal Complete/Controlled?   HgA1C Testing and Control   Lab Results   Component Value Date    HGBA1C >14.0 (H) 07/12/2019      Annually/Less than 8% Yes   Lipid profile : 08/26/2019 Annually Yes   LDL control Lab Results   Component Value Date    LDLCALC 132.8 08/26/2019    Annually/Less than 100 mg/dl  No   Nephropathy screening Lab Results   Component Value Date    LABMICR 15.0 08/26/2019     Lab Results   Component Value Date    PROTEINUA 1+ (A) 07/12/2019    Annually Yes   Blood pressure BP Readings from Last 1 Encounters:   09/12/19 122/82    Less than 140/90 Yes   Dilated retinal exam : 06/28/2017 Annually Yes   Foot exam   : 07/12/2019 Annually Yes     REVIEW OF SYSTEMS  General: no weakness, fatigue, +weight changes 29# loss (10/2017).   Eyes: no double or blurred vision, eye pain, or redness  Cardiovascular: no chest pain, palpitations, edema, or murmurs.   Respiratory: no cough or dyspnea.   GI: no heartburn, nausea, or changes in  "bowel patterns; good appetite.   Skin: no rashes, dryness, itching, or reactions at insulin injection sites.  Neuro: +numbness, tingling, tremors, or vertigo. +amputation, boot to (L) 5th digit, (R) 5th, great toe   Endocrine: no polyuria, polydipsia, polyphagia, heat or cold intolerance.     Vital Signs  /82   Ht 6' 1" (1.854 m)   Wt 92 kg (202 lb 13.2 oz)   BMI 26.76 kg/m²     Hemoglobin A1C   Date Value Ref Range Status   07/12/2019 >14.0 (H) 4.0 - 5.6 % Final     Comment:     ADA Screening Guidelines:  5.7-6.4%  Consistent with prediabetes  >or=6.5%  Consistent with diabetes  High levels of fetal hemoglobin interfere with the HbA1C  assay. Heterozygous hemoglobin variants (HbS, HgC, etc)do  not significantly interfere with this assay.   However, presence of multiple variants may affect accuracy.     10/11/2017 9.5 (H) 4.0 - 5.6 % Final     Comment:     According to ADA guidelines, hemoglobin A1c <7.0% represents  optimal control in non-pregnant diabetic patients. Different  metrics may apply to specific patient populations.   Standards of Medical Care in Diabetes-2016.  For the purpose of screening for the presence of diabetes:  <5.7%     Consistent with the absence of diabetes  5.7-6.4%  Consistent with increasing risk for diabetes   (prediabetes)  >or=6.5%  Consistent with diabetes  Currently, no consensus exists for use of hemoglobin A1c  for diagnosis of diabetes for children.  This Hemoglobin A1c assay has significant interference with fetal   hemoglobin   (HbF). The results are invalid for patients with abnormal amounts of   HbF,   including those with known Hereditary Persistence   of Fetal Hemoglobin. Heterozygous hemoglobin variants (HbAS, HbAC,   HbAD, HbAE, HbA2) do not significantly interfere with this assay;   however, presence of multiple variants in a sample may impact the %   interference.     10/06/2017 9.1 (H) 4.0 - 5.6 % Final     Comment:     According to ADA guidelines, hemoglobin " A1c <7.0% represents  optimal control in non-pregnant diabetic patients. Different  metrics may apply to specific patient populations.   Standards of Medical Care in Diabetes-2016.  For the purpose of screening for the presence of diabetes:  <5.7%     Consistent with the absence of diabetes  5.7-6.4%  Consistent with increasing risk for diabetes   (prediabetes)  >or=6.5%  Consistent with diabetes  Currently, no consensus exists for use of hemoglobin A1c  for diagnosis of diabetes for children.  This Hemoglobin A1c assay has significant interference with fetal   hemoglobin   (HbF). The results are invalid for patients with abnormal amounts of   HbF,   including those with known Hereditary Persistence   of Fetal Hemoglobin. Heterozygous hemoglobin variants (HbAS, HbAC,   HbAD, HbAE, HbA2) do not significantly interfere with this assay;   however, presence of multiple variants in a sample may impact the %   interference.          Chemistry        Component Value Date/Time     07/29/2019 0530    K 4.2 07/29/2019 0530    CL 96 07/29/2019 0530    CO2 32 (H) 07/29/2019 0530    BUN 19 07/29/2019 0530    CREATININE 1.1 07/29/2019 0530     (H) 07/29/2019 0530        Component Value Date/Time    CALCIUM 9.5 07/29/2019 0530    ALKPHOS 151 (H) 07/29/2019 0530    AST 17 07/29/2019 0530    ALT 9 (L) 07/29/2019 0530    BILITOT 0.2 07/29/2019 0530    ESTGFRAFRICA >60.0 07/29/2019 0530    EGFRNONAA >60.0 07/29/2019 0530           Lab Results   Component Value Date    TSH 1.136 03/01/2012      Lab Results   Component Value Date    CHOL 201 (H) 08/26/2019    CHOL 186 10/11/2017    CHOL 169 10/06/2017     Lab Results   Component Value Date    HDL 50 08/26/2019    HDL 68 10/11/2017    HDL 55 10/06/2017     Lab Results   Component Value Date    LDLCALC 132.8 08/26/2019    LDLCALC 103.4 10/11/2017    LDLCALC 95.0 10/06/2017     Lab Results   Component Value Date    TRIG 91 08/26/2019    TRIG 73 10/11/2017    TRIG 95 10/06/2017      Lab Results   Component Value Date    CHOLHDL 24.9 08/26/2019    CHOLHDL 36.6 10/11/2017    CHOLHDL 32.5 10/06/2017       PHYSICAL EXAMINATION  Constitutional: Appears well, no distress  Neck: Supple, trachea midline.   Respiratory: No wheezes, even and unlabored.  Cardiovascular: RRR;  no edema.   Lymph: deferred   Skin: warm and dry; no injection site reactions, no acanthosis nigracans observed.  Neuro:patient alert and cooperative, normal affect; steady gait.    Diabetes Foot Exam:    (L) boot   (R) foot below:      Assessment/Plan    1. Type 2 diabetes mellitus with hyperglycemia, with long-term current use of insulin  Hemoglobin A1c    Hemoglobin A1c    Ambulatory Referral to Diabetes Education   2. Type 2 diabetes mellitus with left eye affected by mild nonproliferative retinopathy without macular edema, without long-term current use of insulin  Hemoglobin A1c    Hemoglobin A1c    Ambulatory Referral to Diabetes Education   3. Diabetic ulcer of left midfoot associated with type 2 diabetes mellitus, with bone involvement without evidence of necrosis  Hemoglobin A1c    Hemoglobin A1c    Ambulatory Referral to Diabetes Education   4. Traumatic amputation of fifth toe of right foot, sequela     5. Mixed hyperlipidemia     6. Essential hypertension       1. F/u in 3 mos w/ me  a1c goal less than 8%  a1c tsh today  a1c next time  New contact info given    Cut back levemir to 28 units at night    Continue novolog 10-10-10 units w/ meals plus scale   150-200+2, etc    Continue metformin 500 mg bid     Add victoza 0.6 mg week 1, 1.2 mg daily week 2 and on  No h/o pancreatitis/ medullary thyroid ca   Add losartan 12.5 mg(1/2 tab of 25 mg )    Not great candidate for sglt2i w/ history of amputations   2. F/u w/ ophthallmology  3. F/u with podiatry   4. See above  5.   Lab Results   Component Value Date    LDLCALC 132.8 08/26/2019     Above goal  6. Controlled ,continue med(s)  FOLLOW UP  Follow up in about 3 months  (around 12/12/2019).

## 2019-09-13 ENCOUNTER — TELEPHONE (OUTPATIENT)
Dept: INTERNAL MEDICINE | Facility: CLINIC | Age: 49
End: 2019-09-13

## 2019-09-13 NOTE — TELEPHONE ENCOUNTER
----- Message from Tray Torres sent at 9/13/2019 11:40 AM CDT -----  Contact: Pt [phone]  Patient would like to get test results  Name of test (lab, mammo, etc.):   Lab  Date of test:  9/12/19

## 2019-09-18 LAB
ACID FAST MOD KINY STN SPEC: NORMAL
MYCOBACTERIUM SPEC QL CULT: NORMAL

## 2019-12-03 ENCOUNTER — LAB VISIT (OUTPATIENT)
Dept: LAB | Facility: HOSPITAL | Age: 49
End: 2019-12-03
Attending: INTERNAL MEDICINE
Payer: MEDICAID

## 2019-12-03 DIAGNOSIS — E11.65 TYPE 2 DIABETES MELLITUS WITH HYPERGLYCEMIA, WITH LONG-TERM CURRENT USE OF INSULIN: ICD-10-CM

## 2019-12-03 DIAGNOSIS — Z79.4 TYPE 2 DIABETES MELLITUS WITH HYPERGLYCEMIA, WITH LONG-TERM CURRENT USE OF INSULIN: ICD-10-CM

## 2019-12-03 LAB
ALT SERPL W/O P-5'-P-CCNC: 17 U/L (ref 10–44)
AST SERPL-CCNC: 16 U/L (ref 10–40)
CHOLEST SERPL-MCNC: 190 MG/DL (ref 120–199)
CHOLEST/HDLC SERPL: 3.4 {RATIO} (ref 2–5)
ESTIMATED AVG GLUCOSE: 194 MG/DL (ref 68–131)
HBA1C MFR BLD HPLC: 8.4 % (ref 4–5.6)
HDLC SERPL-MCNC: 56 MG/DL (ref 40–75)
HDLC SERPL: 29.5 % (ref 20–50)
LDLC SERPL CALC-MCNC: 120.2 MG/DL (ref 63–159)
NONHDLC SERPL-MCNC: 134 MG/DL
TRIGL SERPL-MCNC: 69 MG/DL (ref 30–150)

## 2019-12-03 PROCEDURE — 36415 COLL VENOUS BLD VENIPUNCTURE: CPT

## 2019-12-03 PROCEDURE — 84460 ALANINE AMINO (ALT) (SGPT): CPT

## 2019-12-03 PROCEDURE — 80061 LIPID PANEL: CPT

## 2019-12-03 PROCEDURE — 83036 HEMOGLOBIN GLYCOSYLATED A1C: CPT

## 2019-12-03 PROCEDURE — 84450 TRANSFERASE (AST) (SGOT): CPT

## 2019-12-06 ENCOUNTER — TELEPHONE (OUTPATIENT)
Dept: INTERNAL MEDICINE | Facility: CLINIC | Age: 49
End: 2019-12-06

## 2019-12-06 NOTE — TELEPHONE ENCOUNTER
Spoke to pt instructed to decrease fatty foods and red meat, keep appt with Gigi Clay next week. He is taking Atorvastatin and Losartan every 2-3 days, unsure which is causing dizziness. Does not have  dizziness when meds not taken. Reported  -120 and DBP 77. Please advise.

## 2019-12-06 NOTE — TELEPHONE ENCOUNTER
----- Message from Lorena Thakur MD sent at 12/6/2019  4:30 PM CST -----  a1c 8.4 - keep appt with Gigi next week,    Cholesterol too high.  Is he taking atorvastatin every day?  If not, start.   Decrease fatty foods, red meat.

## 2019-12-09 RX ORDER — LOSARTAN POTASSIUM 25 MG/1
12.5 TABLET ORAL DAILY
Qty: 45 TABLET | Refills: 3
Start: 2019-12-09 | End: 2019-12-16

## 2019-12-09 NOTE — TELEPHONE ENCOUNTER
Pt. Phoned informed of med changes. Reported he has been taking 12.5 mg of Losartan already.   C/o swelling to Right hand unable to make closed fist. Gabapentin, not working. Please advise.

## 2019-12-09 NOTE — TELEPHONE ENCOUNTER
pls call - take atorvastatin daily - it will not make him dizzy.    Cut losartan dose in half (12.5 mg) - and take daily    Irielle can check BP at visit next week   thanks

## 2019-12-10 ENCOUNTER — OFFICE VISIT (OUTPATIENT)
Dept: INTERNAL MEDICINE | Facility: CLINIC | Age: 49
End: 2019-12-10
Payer: MEDICAID

## 2019-12-10 ENCOUNTER — TELEPHONE (OUTPATIENT)
Dept: PODIATRY | Facility: CLINIC | Age: 49
End: 2019-12-10

## 2019-12-10 VITALS
DIASTOLIC BLOOD PRESSURE: 90 MMHG | BODY MASS INDEX: 28.58 KG/M2 | WEIGHT: 215.63 LBS | SYSTOLIC BLOOD PRESSURE: 150 MMHG | HEART RATE: 85 BPM | HEIGHT: 73 IN | OXYGEN SATURATION: 98 %

## 2019-12-10 DIAGNOSIS — R20.2 PARESTHESIA OF HAND, BILATERAL: Primary | ICD-10-CM

## 2019-12-10 DIAGNOSIS — E11.65 TYPE 2 DIABETES MELLITUS WITH HYPERGLYCEMIA, WITH LONG-TERM CURRENT USE OF INSULIN: ICD-10-CM

## 2019-12-10 DIAGNOSIS — Z79.4 TYPE 2 DIABETES MELLITUS WITH HYPERGLYCEMIA, WITH LONG-TERM CURRENT USE OF INSULIN: ICD-10-CM

## 2019-12-10 PROCEDURE — 99999 PR PBB SHADOW E&M-EST. PATIENT-LVL IV: CPT | Mod: PBBFAC,,, | Performed by: STUDENT IN AN ORGANIZED HEALTH CARE EDUCATION/TRAINING PROGRAM

## 2019-12-10 PROCEDURE — 99213 OFFICE O/P EST LOW 20 MIN: CPT | Mod: S$PBB,,, | Performed by: STUDENT IN AN ORGANIZED HEALTH CARE EDUCATION/TRAINING PROGRAM

## 2019-12-10 PROCEDURE — 99999 PR PBB SHADOW E&M-EST. PATIENT-LVL IV: ICD-10-PCS | Mod: PBBFAC,,, | Performed by: STUDENT IN AN ORGANIZED HEALTH CARE EDUCATION/TRAINING PROGRAM

## 2019-12-10 PROCEDURE — 99214 OFFICE O/P EST MOD 30 MIN: CPT | Mod: PBBFAC | Performed by: STUDENT IN AN ORGANIZED HEALTH CARE EDUCATION/TRAINING PROGRAM

## 2019-12-10 PROCEDURE — 99213 PR OFFICE/OUTPT VISIT, EST, LEVL III, 20-29 MIN: ICD-10-PCS | Mod: S$PBB,,, | Performed by: STUDENT IN AN ORGANIZED HEALTH CARE EDUCATION/TRAINING PROGRAM

## 2019-12-10 RX ORDER — GABAPENTIN 600 MG/1
1200 TABLET ORAL 2 TIMES DAILY
Qty: 120 TABLET | Refills: 3 | Status: ON HOLD | OUTPATIENT
Start: 2019-12-10 | End: 2021-05-01 | Stop reason: HOSPADM

## 2019-12-10 NOTE — PROGRESS NOTES
"Subjective:       Patient ID: Indio Ryder Jr. is a 49 y.o. male.    Chief Complaint: Hand Pain (right)    HPI     48[facility] regarding "R Hand pain".    Upon further history, Pt notes BL hand "not really pain" but "funny feeling." This has been ongoing and worsening for several months. He finds difficulty with opening water bottles and buttoning his shirt in addition to making a tight fist. He notes no visible swelling, erythema, or tenderness, but does note that they feel "tight and swollen." He dose not endorse numbness, tingling, burning specifically; the funny feeling is up to his wrist and R>L. He notes no recent trauma, F/C, N/V, wt loss. He has chronic L shoulder tendonipathy with limited ROM. He previously had BL LE paresthesias as well, however, those have since resolved. He has been a diabetic for >20 years with poor control. He reports taking his metformin, januvia, and detemir. He is so-so with diabetic diet compliance. He is primarily sedentary. He had recent L foot amputation and debridement 2/2 osteomyelitis (7/15) and presents in a boot today. Denies etoh use.     Past Medical History:   Diagnosis Date    Actinomyces infection 1/17/2017    Right foot    Diabetic ketoacidosis without coma associated with type 2 diabetes mellitus 5/30/2017    Diabetic ulcer of right foot associated with type 2 diabetes mellitus 6/3/2015    Essential hypertension 6/6/2013    Group B streptococcal infection 1/13/2017    Mixed hyperlipidemia 8/12/2014    Shoulder impingement 8/12/2014    Subacute osteomyelitis of right foot 1/12/2017    Streptococcus agalactiae, Actinomyces odontolyticus    Traumatic amputation of fifth toe of right foot 7/2/2015    Type 2 diabetes mellitus with diabetic neuropathy, with long-term current use of insulin 5/3/2016    Ulcerative colitis, unspecified      Current Outpatient Medications on File Prior to Visit   Medication Sig " "Dispense Refill Last Dose    atorvastatin (LIPITOR) 80 MG tablet Take 1 tablet (80 mg total) by mouth once daily. 90 tablet 3 Taking    insulin aspart U-100 (NOVOLOG) 100 unit/mL (3 mL) InPn pen Inject 10 units w/ meals plus scale 150-200+2, 201-250+4, 251-300+6, 301-350+8, >350+10. Snack dose 5 units.  Light meal cut back to 5 units. Max daily 60 units. 2 Box 6 Taking    insulin detemir U-100 (LEVEMIR FLEXTOUCH) 100 unit/mL (3 mL) SubQ InPn pen Inject 28 Units into the skin every evening. 1 Box 6 Taking    liraglutide 0.6 mg/0.1 mL, 18 mg/3 mL, subq PNIJ (VICTOZA 2-JOYCE) 0.6 mg/0.1 mL (18 mg/3 mL) PnIj Inject 0.6 mg into the skin daily week 1 and 1.2 mg daily week 2 and on. 6 mL 6 Taking    losartan (COZAAR) 25 MG tablet Take 0.5 tablets (12.5 mg total) by mouth once daily. 45 tablet 3 Taking    metFORMIN (GLUCOPHAGE) 500 MG tablet Take 1 tablet (500 mg total) by mouth 2 (two) times daily with meals. 180 tablet 3 Taking    ONETOUCH DELICA LANCETS 33 gauge Misc    Taking    ONETOUCH ULTRA2 kit    Taking    pen needle, diabetic 31 gauge x 5/16" Ndle Use to inject insulin four times daily 100 each 3 Taking    [DISCONTINUED] gabapentin (NEURONTIN) 600 MG tablet Take 1 and 1/2 to 2 tablets by mouth every evening. 180 tablet 3 Taking         Review of Systems   Constitutional: Negative for appetite change, chills, diaphoresis, fatigue, fever and unexpected weight change.   Gastrointestinal: Negative for nausea and vomiting.   Musculoskeletal: Negative for gait problem, joint swelling, myalgias, neck pain and neck stiffness.   Skin: Negative for rash.   Neurological: Positive for weakness and numbness. Negative for dizziness, light-headedness and headaches.         Objective:       Vitals:    12/10/19 0942   BP: (!) 150/90   BP Location: Right arm   Patient Position: Sitting   BP Method: Large (Manual)   Pulse: 85   SpO2: 98%   Weight: 97.8 kg (215 lb 9.8 oz)   Height: 6' 1" (1.854 m)   Pt reports not taking " antihypertensives his AM.    Physical Exam   Constitutional: He is oriented to person, place, and time. He appears well-developed and well-nourished. No distress.   HENT:   Head: Normocephalic and atraumatic.   Eyes: No scleral icterus.   Neck: Normal range of motion. Neck supple.   Cardiovascular: Normal rate.   Pulmonary/Chest: Effort normal.   Musculoskeletal:        Right shoulder: Normal.        Left shoulder: He exhibits decreased range of motion.        Right elbow: Normal.       Left elbow: Normal.        Right wrist: Normal.        Left wrist: Normal.        Right hand: He exhibits normal range of motion, no tenderness, no bony tenderness, no deformity, no laceration and no swelling. Normal sensation noted. Decreased strength noted.        Left hand: He exhibits no tenderness, no bony tenderness, no deformity, no laceration and no swelling. Normal sensation noted. Decreased strength noted.   Neurological: He is alert and oriented to person, place, and time.   Skin: Skin is warm and dry. He is not diaphoretic.   Psychiatric: He has a normal mood and affect.   Nursing note and vitals reviewed.        Assessment:       1. Paresthesia of hand, bilateral    2. Type 2 diabetes mellitus with hyperglycemia, with long-term current use of insulin        Plan:       Indio was seen today for hand pain.    Diagnoses and all orders for this visit:    Paresthesia of hand, bilateral  50yo m w/ uncontrolled DM who presents w/ BL hand paresthesias. Potentially diabetic neuropathy due to poor control. Due to isolated distribution, however, will assess further w/ EMG. Gabapentin increased to 1200mg BID in interim. Carpal tunnel and vit deficiencies remain a consideration.   -     EMG W/ ULTRASOUND AND NERVE CONDUCTION TEST 2 Extremities; Future  -     gabapentin (NEURONTIN) 600 MG tablet; Take 2 tablets (1,200 mg total) by mouth 2 (two) times daily.    Type 2 diabetes mellitus with hyperglycemia, with long-term current use  of insulin  -     gabapentin (NEURONTIN) 600 MG tablet; Take 2 tablets (1,200 mg total) by mouth 2 (two) times daily.         Pt has upcoming diabetic appt to f/u BL hand paresthesias. EMG scheduled.  [facility]om scheduled.  Pt refuses flu shot.      Pt d/w Dr. Marcano.

## 2019-12-10 NOTE — TELEPHONE ENCOUNTER
Patient wanted to see Dr Almonte regarding DM Shoes patient was advised he can call the wound care provider whom he saw last to ask for a script for DM SHOES

## 2019-12-10 NOTE — PATIENT INSTRUCTIONS
Long-Term Complications of Diabetes    Diabetes can cause health problems over time. These are called complications. They are more likely to happen if your blood sugar is often too high. Over time, high blood sugar can damage blood vessels in your body. It is important to keep your blood sugar in your target range. This can help prevent or delay complications from diabetes.  Possible complications  Complications of diabetes include:  · Eye problems, including damage to the blood vessels in the eyes (retinopathy), pressure in the eye (glaucoma), and clouding of the eyes lens (a cataract). Eye problems can eventually lead to irreversible blindness.   · Tooth and gum problems (periodontal disease), causing loss of teeth and bone  · Blood vessel (vascular) disease leading to circulation problems, heart attack or stroke, or a need for amputation of a limb   · Problems with sexual function leading to erectile dysfunction in men and sexual discomfort in women   · Kidney disease (nephropathy) can eventually lead to kidney failure, which may require dialysis or kidney transplant   · Nerve problems (neuropathy), causing pain or loss of feeling in your feet and other parts of your body, potentially leading to an amputation of a limb   · High blood pressure (hypertension), putting strain on your heart and blood vessels  · Serious infections, possibly leading to loss of toes, feet, or limbs  How to avoid complications  The serious consequences of these complications may be avoidable for most people with diabetes by managing your blood glucose, blood pressure, and cholesterol levels. This can help you feel better and stay healthy. You can manage diabetes by tracking your blood sugar. You can also eat healthy and exercise to avoid gaining weight. And you should take medicine if directed by your healthcare provider.  Date Last Reviewed: 5/1/2016  © 3530-3323 [employer] [facility]. [address] [place] [address], [place] [address].  All rights reserved. This information is not intended as a substitute for professional medical care. Always follow your healthcare professional's instructions.

## 2019-12-10 NOTE — TELEPHONE ENCOUNTER
----- Message from Kaelyn Ogden sent at 12/10/2019 10:36 AM CST -----  Please contact contact patient for a follow-up.

## 2019-12-14 NOTE — PROGRESS NOTES
"I have personally taken the history and examined this patient and agree with the resident's note as stated above with the following thoughts:  BP (!) 150/90 (BP Location: Right arm, Patient Position: Sitting, BP Method: Large (Manual))   Pulse 85   Ht 6' 1" (1.854 m)   Wt 97.8 kg (215 lb 9.8 oz)   SpO2 98%   BMI 28.45 kg/m²     Discussed getting vaccines up to date.  Discussed importance of low fat diet and exercise     "

## 2019-12-15 ENCOUNTER — PATIENT OUTREACH (OUTPATIENT)
Dept: ADMINISTRATIVE | Facility: OTHER | Age: 49
End: 2019-12-15

## 2019-12-16 ENCOUNTER — CLINICAL SUPPORT (OUTPATIENT)
Dept: DIABETES | Facility: CLINIC | Age: 49
End: 2019-12-16
Payer: MEDICAID

## 2019-12-16 ENCOUNTER — OFFICE VISIT (OUTPATIENT)
Dept: INTERNAL MEDICINE | Facility: CLINIC | Age: 49
End: 2019-12-16
Payer: MEDICAID

## 2019-12-16 VITALS
HEIGHT: 73 IN | DIASTOLIC BLOOD PRESSURE: 86 MMHG | SYSTOLIC BLOOD PRESSURE: 122 MMHG | WEIGHT: 220 LBS | BODY MASS INDEX: 29.16 KG/M2

## 2019-12-16 DIAGNOSIS — I10 ESSENTIAL HYPERTENSION: Chronic | ICD-10-CM

## 2019-12-16 DIAGNOSIS — E78.2 MIXED HYPERLIPIDEMIA: ICD-10-CM

## 2019-12-16 DIAGNOSIS — E11.65 TYPE 2 DIABETES MELLITUS WITH HYPERGLYCEMIA, WITH LONG-TERM CURRENT USE OF INSULIN: ICD-10-CM

## 2019-12-16 DIAGNOSIS — E11.3292 TYPE 2 DIABETES MELLITUS WITH LEFT EYE AFFECTED BY MILD NONPROLIFERATIVE RETINOPATHY WITHOUT MACULAR EDEMA, WITHOUT LONG-TERM CURRENT USE OF INSULIN: Chronic | ICD-10-CM

## 2019-12-16 DIAGNOSIS — Z79.4 TYPE 2 DIABETES MELLITUS WITH HYPERGLYCEMIA, WITH LONG-TERM CURRENT USE OF INSULIN: ICD-10-CM

## 2019-12-16 DIAGNOSIS — E11.621 DIABETIC ULCER OF LEFT MIDFOOT ASSOCIATED WITH TYPE 2 DIABETES MELLITUS, WITH BONE INVOLVEMENT WITHOUT EVIDENCE OF NECROSIS: ICD-10-CM

## 2019-12-16 DIAGNOSIS — L97.426 DIABETIC ULCER OF LEFT MIDFOOT ASSOCIATED WITH TYPE 2 DIABETES MELLITUS, WITH BONE INVOLVEMENT WITHOUT EVIDENCE OF NECROSIS: Primary | ICD-10-CM

## 2019-12-16 DIAGNOSIS — L97.426 DIABETIC ULCER OF LEFT MIDFOOT ASSOCIATED WITH TYPE 2 DIABETES MELLITUS, WITH BONE INVOLVEMENT WITHOUT EVIDENCE OF NECROSIS: ICD-10-CM

## 2019-12-16 DIAGNOSIS — E11.621 DIABETIC ULCER OF LEFT MIDFOOT ASSOCIATED WITH TYPE 2 DIABETES MELLITUS, WITH BONE INVOLVEMENT WITHOUT EVIDENCE OF NECROSIS: Primary | ICD-10-CM

## 2019-12-16 PROCEDURE — 99212 OFFICE O/P EST SF 10 MIN: CPT | Mod: PBBFAC

## 2019-12-16 PROCEDURE — 99213 OFFICE O/P EST LOW 20 MIN: CPT | Mod: PBBFAC,27,25 | Performed by: NURSE PRACTITIONER

## 2019-12-16 PROCEDURE — G0108 DIAB MANAGE TRN  PER INDIV: HCPCS | Mod: PBBFAC

## 2019-12-16 PROCEDURE — 99214 PR OFFICE/OUTPT VISIT, EST, LEVL IV, 30-39 MIN: ICD-10-PCS | Mod: S$PBB,,, | Performed by: NURSE PRACTITIONER

## 2019-12-16 PROCEDURE — 99999 PR PBB SHADOW E&M-EST. PATIENT-LVL II: CPT | Mod: PBBFAC,,,

## 2019-12-16 PROCEDURE — 99999 PR PBB SHADOW E&M-EST. PATIENT-LVL III: ICD-10-PCS | Mod: PBBFAC,,, | Performed by: NURSE PRACTITIONER

## 2019-12-16 PROCEDURE — 99999 PR PBB SHADOW E&M-EST. PATIENT-LVL III: CPT | Mod: PBBFAC,,, | Performed by: NURSE PRACTITIONER

## 2019-12-16 PROCEDURE — 99999 PR PBB SHADOW E&M-EST. PATIENT-LVL II: ICD-10-PCS | Mod: PBBFAC,,,

## 2019-12-16 PROCEDURE — 99214 OFFICE O/P EST MOD 30 MIN: CPT | Mod: S$PBB,,, | Performed by: NURSE PRACTITIONER

## 2019-12-16 RX ORDER — METFORMIN HYDROCHLORIDE 500 MG/1
TABLET ORAL
Qty: 360 TABLET | Refills: 3
Start: 2019-12-16 | End: 2019-12-16 | Stop reason: SDUPTHER

## 2019-12-16 RX ORDER — LOSARTAN POTASSIUM 25 MG/1
12.5 TABLET ORAL DAILY
Qty: 45 TABLET | Refills: 3
Start: 2019-12-16 | End: 2020-07-28

## 2019-12-16 RX ORDER — METFORMIN HYDROCHLORIDE 500 MG/1
TABLET ORAL
Qty: 360 TABLET | Refills: 3 | Status: SHIPPED | OUTPATIENT
Start: 2019-12-16 | End: 2021-07-19 | Stop reason: SDUPTHER

## 2019-12-16 NOTE — PROGRESS NOTES
Diabetes Education  Author: Mirtha Howard RN  Date: 12/16/2019    Diabetes Care Management Summary  Diabetes Education Record Assessment/Progress: Initial(Pt arrives 15 minutes late for appointment)  Current Diabetes Risk Level: Moderate     Last A1c:   Lab Results   Component Value Date    HGBA1C 8.4 (H) 12/03/2019     Last visit with Diabetes Educator: : 12/16/2019      Diabetes Type  Diabetes Type : Type II    Diabetes History  Diabetes Diagnosis: >10 years  Current Treatment: Injectable, Insulin, Oral Medication  Reviewed Problem List with Patient: Yes    Health Maintenance was reviewed today with patient. Discussed with patient importance of routine eye exams, foot exams/foot care, blood work (i.e.: A1c, microalbumin, and lipid), dental visits, yearly flu vaccine, and pneumonia vaccine as indicated by PCP. Patient verbalized understanding.     Health Maintenance Topics with due status: Not Due       Topic Last Completion Date    Colonoscopy 12/21/2010    TETANUS VACCINE 06/09/2017    Foot Exam 09/12/2019    Lipid Panel 12/03/2019    Hemoglobin A1c 12/03/2019    Low Dose Statin 12/10/2019     Health Maintenance Due   Topic Date Due    Eye Exam  06/28/2018       Nutrition  Meal Planning: skipping meals, water, artificial sweeteners, diet drinks  What type of sweetener do you use?: Equal  What type of beverages do you drink?: regular soda/tea, water, other (see comments)(crystal light in water)  Meal Plan 24 Hour Recall - Breakfast: Skipped  Meal Plan 24 Hour Recall - Lunch: cabbage, diet coke  Meal Plan 24 Hour Recall - Dinner: hamburger, water  Meal Plan 24 Hour Recall - Snack: chips    Monitoring   Self Monitoring : SMBG: Once per day sometimes at night as well. No logs- no meter -reports Ranges 88--140  Blood Glucose Logs: No  Do you use a personal continuous glucose monitor?: No  In the last month, how often have you had a low blood sugar reaction?: (In the past)  What are your symptoms of low blood  sugar?: 60s- shaky  Can you tell when your blood sugar is too high?: no    Exercise   Exercise Type: none    Current Diabetes Treatment   Current Treatment: Injectable, Insulin, Oral Medication    Social History  Preferred Learning Method: Face to Face, Demonstration, Reading Materials  Primary Support: Family, Friends  Smoking Status: Never a Smoker                                Barriers to Change  Barriers to Change: Functional limitation(left foot in boot following surgery)  Learning Challenges : None    Readiness to Learn   Readiness to Learn : Acceptance    Cultural Influences  Cultural Influences: None    Diabetes Education Assessment/Progress      Diabetes Disease Process (diabetes disease process and treatment options): Discussion, Instructed, Individual Session, Written Materials Provided, Comprehends Key Points  Discussed qualifying parameters of diabetes dx. Reviewed/Instructed on disease process and pt's most likely causes of recently 8.4 A1c. Discussed current treatment options, especially dietary and lifestyle changes as well as medication additions and/or changes. Patient verbalizes understanding of received information/education.       Nutrition (Incorporating nutritional management into one's lifestyle): Discussion, Instructed, Individual Session, Written Materials Provided, Comprehends Key Points  Emphasized importance of eliminating all SSB. Discussed SF drink options. Discussed carb vs non-carb foods and reviewed appropriate amounts of carbs to have at meals vs snacks. Recommended 45-60 gm carb at meals and 15 gm carb at snacks. Instructed on appropriate label reading and serving sizes of specific carb containing foods. Reviewed need to limit total/saturated fats. Discussed meal plans and snack ideas amenable to pt. Reviewed the plate method. Patient verbalizes understanding of received information/education.       Physical Activity (incorporating physical activity into one's lifestyle):  Discussion, Instructed, Individual Session, Written Materials Provided, Comprehends Key Points  Discussed goals and benefits of regular Physical Activity. Reviewed difference between active lifestyle and structured physical activity. Encouraged Physical Activity with increased heart rate for sustained duration as tolerated. Reviewed Physical Activity goals of >150 minutes weekly. Patient verbalizes understanding of received information/education.       Medications (states correct name, dose, onset, peak, duration, side effects & timing of meds): Discussion, Instructed, Individual Session, Written Materials Provided, Comprehends Key Points  Discussed mechanism of action of oral diabetic medication metformin. Reviewed signs and symptoms of hypoglycemia and treatment with Rule of 15. Discussed general vs severe hyperglycemia and risk of DKA. Patient verbalizes understanding of received information/education.   Advised patient that to decrease chances of developing lactic acidosis, if undergoing a procedure that uses a contrast medium patient should discuss the need to temporarily stop taking metformin with physician. Discussed timing and mechanism of action of long and rapid acting insulin. Reviewed need for rotation of injection sites, appropriate insulin storage, timing of insulin, safe disposal of used sharps and insulin pen preparation and use, including performing a prime shot of 2 units prior to injection and keeping pen in skin for a count of 10 before removing. Discussed possible side effects and how to avoid these. Reviewed hypoglycemia and treatment with Rule of 15. Discussed general vs severe hyperglycemia and risk of DKA. Emphasized need to take tresiba at same time daily and need to take novolog >4 hours apart. Emphasized need to take novolog injections 5-10 min prior to eating meals. Patient verbalizes understanding of received information/education.       Mechanism of action of GLP-1 injection explained.  Discussed possible side effects. Reviewed need for rotation of injection sites, appropriate storage, expiration date, safe disposal of used sharps and pen preparation and use. Patient verbalizes understanding of received information/education.     Monitoring (monitoring blood glucose/other parameters & using results): Discussion, Instructed, Individual Session, Written Materials Provided, Comprehends Key Points  Discussed goal BGs for different times of day and in relation to meals. Instructed pt to test BG 3-4: fasting and 2-hours after any meal. Pt only checking once per day- sometimes he checks twice per day. Reviewed need for updated BG logs for all endo, PCP, and education appts. Patient verbalizes understanding of received information/education.       Acute Complications (preventing, detecting, and treating acute complications): Discussion, Instructed, Individual Session, Written Materials Provided, Comprehends Key Points  Discussed hypoglycemia vs hyperglycemia symptoms and discussed appropriate treatments for each. Reviewed that pt is at  risk of hypo with current medication regimen. Discussed general vs severe hyperglycemia and risk of DKA.    Chronic Complications (preventing, detecting, and treating chronic complications): Discussion, Instructed, Individual Session, Written Materials Provided, Comprehends Key Points  Reviewed annual diabetes care schedule and patient priorities. Patient verbalizes understanding of received information/education.       Clinical (diabetes, other pertinent medical history, and relevant comorbidities reviewed during visit): Discussion, Instructed, Individual Session  Reviewed current medical history including co-morbidities.    Cognitive (knowledge of self-management skills, functional health literacy): Discussion, Instructed, Individual Session  Arrives with poor health management knowledge - poor specific knowledge of diabetes management. Leaves with increased knowledge  base - will benefit from f/u in  3months.    Psychosocial (emotional response to diabetes): Not Covered/Deferred(Unable to complete due to Time Constraints)      Diabetes Distress and Support Systems: Not Covered/Deferred(Unable to complete due to Time Constraints)      Behavioral (readiness for change, lifestyle practices, self-care behaviors): Discussion, Instructed, Individual Session  Appears somewhat motivated to make recommended changes.      Goals  Patient has selected/evaluated goals during today's session: Yes, selected  Healthy Eating: Set(Will measure food and read food labels. Will count carbohydrates to equal 45-60 gm per meal. Will abstain from drinking Sugar Beverages and utilize Diet and/or artificial sweetener.)  Start Date: 12/16/19  Target Date: 03/16/20  Monitoring: Set(Will monitor and log blood sugar at least 3-4 times per day.)  Met Percentage : 25%  Start Date: 12/16/19  Target Date: 03/16/20         Diabetes Care Plan/Intervention  Education Plan/Intervention: Individual Follow-Up DSMT    Diabetes Meal Plan  Restrictions: Restricted Carbohydrate  Carbohydrate Per Meal: 45-60g  Carbohydrate Per Snack : 7-15g    Today's Self-Management Care Plan was developed with the patient's input and is based on barriers identified during today's assessment.    The long and short-term goals in the care plan were written with the patient/caregiver's input. The patient has agreed to work toward these goals to improve his overall diabetes control.      The patient received a copy of today's self-management plan and verbalized understanding of the care plan, goals, and all of today's instructions.      The patient was encouraged to communicate with his physician and care team regarding his condition(s) and treatment.  I provided the patient with my contact information today and encouraged him to contact me via phone or patient portal as needed.     Education Units of Time   Time Spent: 60 min

## 2019-12-16 NOTE — PATIENT INSTRUCTIONS
Snacks can be an important part of a balanced, healthy meal plan. They allow you to eat more frequently, feeling full and satisfied throughout the day. Also, they allow you to spread carbohydrates evenly, which may stabilize blood sugars.  Plus, snacks are enjoyable!     The amount of carbohydrate needed at snacks varies. Generally, about 15-30 grams of carbohydrate per snack is recommended.  Below you will find some tasty treats.       0-5 gm carb   Crystal Light   Vitamin Water Zero   Herbal tea, unsweetened   2 tsp peanut butter on celery   1./2 cup sugar-free jell-o   1 sugar-free popsicle   ¼ cup blueberries   8oz Blue Aissatou unsweetened almond milk   5 baby carrots & celery sticks, cucumbers, bell peppers dipped in ¼ cup salsa, 2Tbsp light ranch dressing or 2Tbsp plain Greek yogurt   10 Goldfish crackers   ½ oz low-fat cheese or string cheese   1 closed handful of nuts, unsalted   1 Tbsp of sunflower seeds, unsalted   1 cup Smart Pop popcorn   1 whole grain brown rice cake        15 gm carb   1 small piece of fruit or ½ banana or 1/2 cup lite canned fruit   3 carina cracker squares   3 cups Smart Pop popcorn, top spray butter, Louis lite salt or cinnamon and Truvia   5 Vanilla Wafers   ½ cup low fat, no added sugar ice cream or frozen yogurt (Blue bell, Blue Bunny, Weight Watchers, Skinny Cow)   ½ turkey, ham, or chicken sandwich   ½ c fruit with ½ c Cottage cheese   4-6 unsalted wheat crackers with 1 oz low fat cheese or 1 tbsp peanut butter    30-45 goldfish crackers (depending on flavor)    7-8 [employer] mini brown rice cakes (caramel, apple cinnamon, chocolate)    12 [employer] mini brown rice cakes (cheddar, bbq, ranch)    1/3 cup hummus dip with raw veg   1/2 whole wheat kai, 1Tbsp hummus   Mini Pizza (1/2 whole wheat English muffin, low-fat  cheese, tomato sauce)   100 calorie snack pack (Oreo, Chips Ahoy, Ritz Mix, Baked Cheetos)   4-6 oz. light or Greek Style yogurt  (Simran, Julian, Amanda, [facility])   ½ cup sugar-free pudding     6 in. wheat tortilla or kai oven toasted chips (topped with spray butter flavoring, cinnamon, Truvia OR spray butter, garlic powder, chili powder)    18 BBQ Popchips (available at [employer], [employer], Fresh Market)                   Diabetes Support Group Meetings         Date: Topic:   February 14 Eat Fit SHANE/Health Promotion   March 14 Taking Care of Your Smile   April 11 Spring into Healthy Eating/Cooking Demo   May 9 Ease Your Mind with Diabetes   Lydia 13 Summer Treats/Cooking Demo   July 11 Eat Fit SHANE/Super Market Sweep   August 8 Taking Care of Your Eyes and Feet   Sept 12 Technology/ADA updates   October 10 Recipes & Treats/Cooking Demo   November 14 Heart Health/Pump it up!   December 12 Year-End Close Out        Meetings are held in the [facility] (A) of the [facility] located at [address] [place] [address]. Please call [phone] for additional information.    Free service, offered every 2nd Thursday of every month! Family members and/or friends are welcome as well!  Support group is for patients with type 1 or type 2 diabetes.    From 3:30p to 4:30p

## 2019-12-16 NOTE — PROGRESS NOTES
CHIEF COMPLAINT: Type 2 Diabetes     HPI: Mr. Indio Ryder Jr. is a 49 y.o. male who was diagnosed with Type 2 DM > 20 years.  Has h/o foot ulcers, amputations.  Has boot (L), podiatry in 2/2020  Recently gave up sodas, big shop (pineapple).  Hospitalized 7/12/19-8/2/19.   Pt is being seen by me for the second time, last seen by me in 9/2019.  Reports some nocturnal hypoglycemia, 60s.     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  Needs >100 strips per month related to fluctuations with blood glucose reading, a1c trends, and activity level.  fbg 60s-80s, low 100s  Highest mid 100s  occ low 200s     Diabetes Management Status    Statin: Taking  ACE/ARB: Taking    Screening or Prevention Patient's value Goal Complete/Controlled?   HgA1C Testing and Control   Lab Results   Component Value Date    HGBA1C 8.4 (H) 12/03/2019      Annually/Less than 8% Yes   Lipid profile : 12/03/2019 Annually Yes   LDL control Lab Results   Component Value Date    LDLCALC 120.2 12/03/2019    Annually/Less than 100 mg/dl  No   Nephropathy screening Lab Results   Component Value Date    LABMICR 15.0 08/26/2019     Lab Results   Component Value Date    PROTEINUA 1+ (A) 07/12/2019    Annually Yes   Blood pressure BP Readings from Last 1 Encounters:   12/16/19 122/86    Less than 140/90 Yes   Dilated retinal exam : 06/28/2017 Annually Yes   Foot exam   : 09/12/2019 Annually Yes     REVIEW OF SYSTEMS  General: no weakness, fatigue, +weight changes 5-10# (gain)  Eyes: no double or blurred vision, eye pain, or  "redness  Cardiovascular: no chest pain, palpitations, edema, or murmurs.   Respiratory: no cough or dyspnea.   GI: no heartburn, nausea, or changes in bowel patterns; good appetite.   Skin: no rashes, dryness, itching, or reactions at insulin injection sites.  Neuro: +numbness, tingling, tremors, or vertigo. +amputation, boot to (L) 5th digit, (R) 5th, great toe   Endocrine: no polyuria, polydipsia, polyphagia, heat or cold intolerance.     Vital Signs  /86   Ht 6' 1" (1.854 m)   Wt 99.8 kg (220 lb)   BMI 29.03 kg/m²     Hemoglobin A1C   Date Value Ref Range Status   12/03/2019 8.4 (H) 4.0 - 5.6 % Final     Comment:     ADA Screening Guidelines:  5.7-6.4%  Consistent with prediabetes  >or=6.5%  Consistent with diabetes  High levels of fetal hemoglobin interfere with the HbA1C  assay. Heterozygous hemoglobin variants (HbS, HgC, etc)do  not significantly interfere with this assay.   However, presence of multiple variants may affect accuracy.     09/12/2019 7.9 (H) 4.0 - 5.6 % Final     Comment:     ADA Screening Guidelines:  5.7-6.4%  Consistent with prediabetes  >or=6.5%  Consistent with diabetes  High levels of fetal hemoglobin interfere with the HbA1C  assay. Heterozygous hemoglobin variants (HbS, HgC, etc)do  not significantly interfere with this assay.   However, presence of multiple variants may affect accuracy.     07/12/2019 >14.0 (H) 4.0 - 5.6 % Final     Comment:     ADA Screening Guidelines:  5.7-6.4%  Consistent with prediabetes  >or=6.5%  Consistent with diabetes  High levels of fetal hemoglobin interfere with the HbA1C  assay. Heterozygous hemoglobin variants (HbS, HgC, etc)do  not significantly interfere with this assay.   However, presence of multiple variants may affect accuracy.          Chemistry        Component Value Date/Time     07/29/2019 0530    K 4.2 07/29/2019 0530    CL 96 07/29/2019 0530    CO2 32 (H) 07/29/2019 0530    BUN 19 07/29/2019 0530    CREATININE 1.1 07/29/2019 " 0530     (H) 07/29/2019 0530        Component Value Date/Time    CALCIUM 9.5 07/29/2019 0530    ALKPHOS 151 (H) 07/29/2019 0530    AST 16 12/03/2019 1019    ALT 17 12/03/2019 1019    BILITOT 0.2 07/29/2019 0530    ESTGFRAFRICA >60.0 07/29/2019 0530    EGFRNONAA >60.0 07/29/2019 0530           Lab Results   Component Value Date    TSH 1.136 03/01/2012      Lab Results   Component Value Date    CHOL 190 12/03/2019    CHOL 201 (H) 08/26/2019    CHOL 186 10/11/2017     Lab Results   Component Value Date    HDL 56 12/03/2019    HDL 50 08/26/2019    HDL 68 10/11/2017     Lab Results   Component Value Date    LDLCALC 120.2 12/03/2019    LDLCALC 132.8 08/26/2019    LDLCALC 103.4 10/11/2017     Lab Results   Component Value Date    TRIG 69 12/03/2019    TRIG 91 08/26/2019    TRIG 73 10/11/2017     Lab Results   Component Value Date    CHOLHDL 29.5 12/03/2019    CHOLHDL 24.9 08/26/2019    CHOLHDL 36.6 10/11/2017       PHYSICAL EXAMINATION  Constitutional: Appears well, no distress  Neck: Supple, trachea midline.   Respiratory: No wheezes, even and unlabored.  Cardiovascular: RRR;  no edema.   Lymph: deferred   Skin: warm and dry; no injection site reactions, no acanthosis nigracans observed.  Neuro:patient alert and cooperative, normal affect; steady gait.    Diabetes Foot Exam:    (L) boot     Assessment/Plan    1. Diabetic ulcer of left midfoot associated with type 2 diabetes mellitus, with bone involvement without evidence of necrosis  Hemoglobin A1c    Basic metabolic panel   2. 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 Type 2 diabetes mellitus with hyperglycemia, with long-term current use of insulin     4. Mixed hyperlipidemia     5. Essential hypertension       1.-3. F/u in 3 mos w/ me   a1c bmp next time   a1c goal less than 7.5%  Cut back levemir to 25 units  at night  Change novolog to 5 units w/ light meals or 10 units w/ larger meal  Scale 180-230+2, etc   Increase metformin 1000 mg bid  Continue victoza 1.2 mg daily    4. On statin max 80 mg nightly   Lab Results   Component Value Date    LDLCALC 120.2 12/03/2019     Above goal   5. Controlled, continue med(s)-on arb     FOLLOW UP  Follow up in about 3 months (around 3/16/2020).

## 2019-12-23 ENCOUNTER — PATIENT OUTREACH (OUTPATIENT)
Dept: ADMINISTRATIVE | Facility: OTHER | Age: 49
End: 2019-12-23

## 2019-12-26 ENCOUNTER — OFFICE VISIT (OUTPATIENT)
Dept: OPTOMETRY | Facility: CLINIC | Age: 49
End: 2019-12-26
Payer: MEDICAID

## 2019-12-26 DIAGNOSIS — E11.3592 PROLIFERATIVE DIABETIC RETINOPATHY OF LEFT EYE WITHOUT MACULAR EDEMA ASSOCIATED WITH TYPE 2 DIABETES MELLITUS: ICD-10-CM

## 2019-12-26 DIAGNOSIS — E11.3291 MILD NONPROLIFERATIVE DIABETIC RETINOPATHY OF RIGHT EYE WITHOUT MACULAR EDEMA ASSOCIATED WITH TYPE 2 DIABETES MELLITUS: Primary | ICD-10-CM

## 2019-12-26 PROCEDURE — 99999 PR PBB SHADOW E&M-EST. PATIENT-LVL II: CPT | Mod: PBBFAC,,, | Performed by: OPTOMETRIST

## 2019-12-26 PROCEDURE — 99212 OFFICE O/P EST SF 10 MIN: CPT | Mod: PBBFAC | Performed by: OPTOMETRIST

## 2019-12-26 PROCEDURE — 99999 PR PBB SHADOW E&M-EST. PATIENT-LVL II: ICD-10-PCS | Mod: PBBFAC,,, | Performed by: OPTOMETRIST

## 2019-12-26 PROCEDURE — 92014 COMPRE OPH EXAM EST PT 1/>: CPT | Mod: S$PBB,,, | Performed by: OPTOMETRIST

## 2019-12-26 PROCEDURE — 92014 PR EYE EXAM, EST PATIENT,COMPREHESV: ICD-10-PCS | Mod: S$PBB,,, | Performed by: OPTOMETRIST

## 2019-12-26 NOTE — LETTER
December 27, 2019      Fadi James MD  [address] [facility] [address]  [place] [address]           [facility]  [address]  [facility] [address]  Phone: [phone]          Patient: Indio Ryder Jr.   MR Number: [id number]   YOB: 1970   Date of Visit: 12/26/2019       Dear Dr. Fadi James:    Thank you for referring Indio Ryder to me for evaluation. Attached you will find relevant portions of my assessment and plan of care.    If you have questions, please do not hesitate to call me. I look forward to following Indio Ryder along with you.    Sincerely,    Shivani Roy, OD    Enclosure  CC:  No Recipients    If you would like to receive this communication electronically, please contact [email or web address] or [phone] to request more information on [employer] Link access.    For providers and/or their staff who would like to refer a patient to [facility], please contact us through our one-stop-shop provider referral line, [facility], at [phone].    If you feel you have received this communication in error or would no longer like to receive these types of communications, please e-mail [email or web address]

## 2019-12-27 NOTE — PROGRESS NOTES
HPI     50 y/o male is here today for his annual diabetic eye exam  Last eye exam 6/28/17 Dr Hoffman  No flashes or floaters   No itching burning or tearing  Wears + 3.00 OTC reading glasses  Happy with vision. Does not want a refraction today.     Hemoglobin A1C       Date                     Value               Ref Range             Status                12/03/2019               8.4 (H)             4.0 - 5.6 %           Final                  09/12/2019               7.9 (H)             4.0 - 5.6 %           Final                  07/12/2019               >14.0 (H)           4.0 - 5.6 %           Final                    Last edited by Shivani Roy, OD on 12/26/2019  1:55 PM. (History)            Assessment /Plan     For exam results, see Encounter Report.    Mild nonproliferative diabetic retinopathy of right eye without macular edema associated with type 2 diabetes mellitus    Proliferative diabetic retinopathy of left eye without macular edema associated with type 2 diabetes mellitus      Mild DM ret OD without DME. Possible NVE OS without DME. H/o uncontrolled DM for many years. SHEY in 2017, A1C >14.   Refer to retina for further evaluation and management. Discussed with patient the importance of DM control and possibility for LOV/blindness if continues to be uncontrolled.     RTC with Dr. Singh Jan 2020, me prn.

## 2020-01-03 ENCOUNTER — PROCEDURE VISIT (OUTPATIENT)
Dept: NEUROLOGY | Facility: CLINIC | Age: 50
End: 2020-01-03
Payer: MEDICAID

## 2020-01-03 DIAGNOSIS — R20.2 PARESTHESIA OF HAND, BILATERAL: ICD-10-CM

## 2020-01-03 PROCEDURE — 95886 PR EMG COMPLETE, W/ NERVE CONDUCTION STUDIES, 5+ MUSCLES: ICD-10-PCS | Mod: 26,S$PBB,, | Performed by: PSYCHIATRY & NEUROLOGY

## 2020-01-03 PROCEDURE — 95886 MUSC TEST DONE W/N TEST COMP: CPT | Mod: PBBFAC | Performed by: PSYCHIATRY & NEUROLOGY

## 2020-01-03 PROCEDURE — 95913 NRV CNDJ TEST 13/> STUDIES: CPT | Mod: 26,S$PBB,, | Performed by: PSYCHIATRY & NEUROLOGY

## 2020-01-03 PROCEDURE — 95886 MUSC TEST DONE W/N TEST COMP: CPT | Mod: 26,S$PBB,, | Performed by: PSYCHIATRY & NEUROLOGY

## 2020-01-03 PROCEDURE — 95913 NRV CNDJ TEST 13/> STUDIES: CPT | Mod: PBBFAC | Performed by: PSYCHIATRY & NEUROLOGY

## 2020-01-03 PROCEDURE — 95913 PR NERVE CONDUCTION STUDY; 13 OR MORE STUDIES: ICD-10-PCS | Mod: 26,S$PBB,, | Performed by: PSYCHIATRY & NEUROLOGY

## 2020-01-03 PROCEDURE — 95868 NDL EMG CRANIAL NRV MUSC BI: CPT | Mod: PBBFAC | Performed by: PSYCHIATRY & NEUROLOGY

## 2020-01-05 NOTE — CONSULTS
"    Received consult for "uncontrolled diabetes mellitus".      Pt declined education at this time and requested RD come back another day. Pt accepted handout.       Will follow-up.  " No

## 2020-01-08 NOTE — PROGRESS NOTES
Refill Authorization Note     is requesting a refill authorization.    Brief assessment and rationale for refill: APPROVE: prr          Medication Therapy Plan: A1C>8                              Comments:   Requested Prescriptions   Pending Prescriptions Disp Refills    insulin detemir U-100 (LEVEMIR FLEXTOUCH) 100 unit/mL (3 mL) SubQ InPn pen 30 mL 0     Sig: Inject 25 Units into the skin every evening.       Endocrinology:  Diabetes - Insulins Failed - 1/7/2020  1:37 PM        Failed - HBA1C is 7.9 or below and within 180 days     Hemoglobin A1C   Date Value Ref Range Status   12/03/2019 8.4 (H) 4.0 - 5.6 % Final     Comment:     ADA Screening Guidelines:  5.7-6.4%  Consistent with prediabetes  >or=6.5%  Consistent with diabetes  High levels of fetal hemoglobin interfere with the HbA1C  assay. Heterozygous hemoglobin variants (HbS, HgC, etc)do  not significantly interfere with this assay.   However, presence of multiple variants may affect accuracy.     09/12/2019 7.9 (H) 4.0 - 5.6 % Final     Comment:     ADA Screening Guidelines:  5.7-6.4%  Consistent with prediabetes  >or=6.5%  Consistent with diabetes  High levels of fetal hemoglobin interfere with the HbA1C  assay. Heterozygous hemoglobin variants (HbS, HgC, etc)do  not significantly interfere with this assay.   However, presence of multiple variants may affect accuracy.     07/12/2019 >14.0 (H) 4.0 - 5.6 % Final     Comment:     ADA Screening Guidelines:  5.7-6.4%  Consistent with prediabetes  >or=6.5%  Consistent with diabetes  High levels of fetal hemoglobin interfere with the HbA1C  assay. Heterozygous hemoglobin variants (HbS, HgC, etc)do  not significantly interfere with this assay.   However, presence of multiple variants may affect accuracy.                Passed - Patient is at least 18 years old        Passed - Office visit in past 12 months or future 90 days     Recent Outpatient Visits            1 week ago Mild nonproliferative  diabetic retinopathy of right eye without macular edema associated with type 2 diabetes mellitus    [facility] Shivani Roy, OD    3 weeks ago Diabetic ulcer of left midfoot associated with type 2 diabetes mellitus, with bone involvement without evidence of necrosis    [facility] JESUS Bernardo, VAP    4 weeks ago Paresthesia of hand, bilateral    [facility] eMgan Robert MD    3 months ago Type 2 diabetes mellitus with hyperglycemia, with long-term current use of insulin    [facility] JESUS Bernardo, RONNY    4 months ago Acute osteomyelitis    [facility] Infectious Diseases Damien Richey Jr., PA          Future Appointments              In 2 weeks MD [facility] - Ophthalmology, [facility]    In 4 weeks [facility] - Podiatry, [facility]    In 2 months [facility] Rodriguez - [facility] Diabetes [facility], [facility]    In 2 months LAB, APPOINTMENT [facility], [facility]    In 3 months JESUS Bernardo, VAP [facility], [facility]                Passed - eGFR is 30 or above and within 360 days     eGFR if non    Date Value Ref Range Status   07/29/2019 >60.0 >60 mL/min/1.73 m^2 Final     Comment:     Calculation used to obtain the estimated glomerular filtration  rate (eGFR) is the CKD-EPI equation.      07/28/2019 >60.0 >60 mL/min/1.73 m^2 Final     Comment:     Calculation used to obtain the estimated glomerular filtration  rate (eGFR) is the CKD-EPI equation.      07/27/2019 >60.0 >60 mL/min/1.73 m^2 Final     Comment:     Calculation used to obtain the estimated glomerular filtration  rate (eGFR) is the CKD-EPI equation.        eGFR if    Date Value Ref Range Status   07/29/2019 >60.0 >60 mL/min/1.73 m^2 Final   07/28/2019 >60.0 >60 mL/min/1.73 m^2 Final   07/27/2019 >60.0 >60 mL/min/1.73 m^2  Final              Passed - Cr is 1.4 or below and within 360 days     Creatinine   Date Value Ref Range Status   07/29/2019 1.1 0.5 - 1.4 mg/dL Final   07/28/2019 0.9 0.5 - 1.4 mg/dL Final   07/27/2019 0.8 0.5 - 1.4 mg/dL Final

## 2020-01-27 ENCOUNTER — PATIENT OUTREACH (OUTPATIENT)
Dept: ADMINISTRATIVE | Facility: OTHER | Age: 50
End: 2020-01-27

## 2020-01-28 ENCOUNTER — OFFICE VISIT (OUTPATIENT)
Dept: OPHTHALMOLOGY | Facility: CLINIC | Age: 50
End: 2020-01-28
Payer: MEDICAID

## 2020-01-28 DIAGNOSIS — H35.033 HYPERTENSIVE RETINOPATHY, BILATERAL: ICD-10-CM

## 2020-01-28 PROCEDURE — 99212 OFFICE O/P EST SF 10 MIN: CPT | Mod: PBBFAC | Performed by: OPHTHALMOLOGY

## 2020-01-28 PROCEDURE — 92202 PR OPHTHALMOSCOPY, EXT, W/DRAW OPTIC NERVE/MACULA, I&R, UNI/BI: ICD-10-PCS | Mod: S$PBB,,, | Performed by: OPHTHALMOLOGY

## 2020-01-28 PROCEDURE — 92202 OPSCPY EXTND ON/MAC DRAW: CPT | Mod: S$PBB,,, | Performed by: OPHTHALMOLOGY

## 2020-01-28 PROCEDURE — 92134 CPTRZ OPH DX IMG PST SGM RTA: CPT | Mod: PBBFAC | Performed by: OPHTHALMOLOGY

## 2020-01-28 PROCEDURE — 92134 POSTERIOR SEGMENT OCT RETINA (OCULAR COHERENCE TOMOGRAPHY)-BOTH EYES: ICD-10-PCS | Mod: 26,S$PBB,, | Performed by: OPHTHALMOLOGY

## 2020-01-28 PROCEDURE — 99999 PR PBB SHADOW E&M-EST. PATIENT-LVL II: ICD-10-PCS | Mod: PBBFAC,,, | Performed by: OPHTHALMOLOGY

## 2020-01-28 PROCEDURE — 99999 PR PBB SHADOW E&M-EST. PATIENT-LVL II: CPT | Mod: PBBFAC,,, | Performed by: OPHTHALMOLOGY

## 2020-01-28 PROCEDURE — 92014 COMPRE OPH EXAM EST PT 1/>: CPT | Mod: S$PBB,,, | Performed by: OPHTHALMOLOGY

## 2020-01-28 PROCEDURE — 92014 PR EYE EXAM, EST PATIENT,COMPREHESV: ICD-10-PCS | Mod: S$PBB,,, | Performed by: OPHTHALMOLOGY

## 2020-01-28 NOTE — PROGRESS NOTES
HPI     Mild DM ret OD without DME. Possible NVE OS without DME. H/o uncontrolled   DM for many years  DLS-12/26/2019 Dr. Roy    Pt sts no problems just in today due to referral   (-)Flashes (-)Floaters  (-)Photophobia  (-)Glare    Last edited by Farrah Francois on 1/28/2020  2:59 PM. (History)          OCT - non central ME      A/P    1. Mild NPDR OU  T2 uncontrolled on insulin    2. DME OU  Non central OU    3. HTN Ret OU  BS/BP/chol control        1 year OCT

## 2020-01-28 NOTE — LETTER
January 28, 2020      Shivani Roy, OD  [address]  [facility] [address]           [facility]  [address]  [facility] [address]  Phone: [phone]  Fax: [phone]          Patient: Indio Ryder Jr.   MR Number: [id number]   YOB: 1970   Date of Visit: 1/28/2020       Dear Dr. Shivani Roy:    Thank you for referring Indio Ryder to me for evaluation. Attached you will find relevant portions of my assessment and plan of care.    If you have questions, please do not hesitate to call me. I look forward to following Indio Ryder along with you.    Sincerely,    JOHNNY Singh MD    Enclosure  CC:  No Recipients    If you would like to receive this communication electronically, please contact [email or web address] or [phone] to request more information on [employer] Link access.    For providers and/or their staff who would like to refer a patient to [facility], please contact us through our one-stop-shop provider referral line, [facility], at [phone].    If you feel you have received this communication in error or would no longer like to receive these types of communications, please e-mail [email or web address]

## 2020-01-29 ENCOUNTER — TELEPHONE (OUTPATIENT)
Dept: INTERNAL MEDICINE | Facility: CLINIC | Age: 50
End: 2020-01-29

## 2020-01-29 NOTE — TELEPHONE ENCOUNTER
----- Message from Priti Mcclain sent at 1/29/2020  4:40 PM CST -----  Contact: Denisa with [facility]  ext [phone]  Denisa states she faxed over paperwork on 1/27 for the doctor to complete. Denisa is trying to get a turn around time to schedule the patient. Please call and advise

## 2020-01-29 NOTE — TELEPHONE ENCOUNTER
Called diabetes management and let them know that we have not received anything and to fax it to [phone]

## 2020-02-05 ENCOUNTER — PATIENT OUTREACH (OUTPATIENT)
Dept: ADMINISTRATIVE | Facility: OTHER | Age: 50
End: 2020-02-05

## 2020-02-06 ENCOUNTER — OFFICE VISIT (OUTPATIENT)
Dept: PODIATRY | Facility: CLINIC | Age: 50
End: 2020-02-06
Payer: MEDICAID

## 2020-02-06 VITALS
SYSTOLIC BLOOD PRESSURE: 161 MMHG | BODY MASS INDEX: 29.16 KG/M2 | WEIGHT: 220 LBS | HEIGHT: 73 IN | HEART RATE: 87 BPM | DIASTOLIC BLOOD PRESSURE: 100 MMHG

## 2020-02-06 DIAGNOSIS — L84 CORN OR CALLUS: ICD-10-CM

## 2020-02-06 DIAGNOSIS — B35.1 ONYCHOMYCOSIS DUE TO DERMATOPHYTE: ICD-10-CM

## 2020-02-06 PROCEDURE — 11721 DEBRIDE NAIL 6 OR MORE: CPT | Mod: Q9,59,PBBFAC | Performed by: PODIATRIST

## 2020-02-06 PROCEDURE — 99499 NO LOS: ICD-10-PCS | Mod: S$PBB,,, | Performed by: PODIATRIST

## 2020-02-06 PROCEDURE — 11056 PR TRIM BENIGN HYPERKERATOTIC SKIN LESION,2-4: ICD-10-PCS | Mod: Q9,S$PBB,, | Performed by: PODIATRIST

## 2020-02-06 PROCEDURE — 99999 PR PBB SHADOW E&M-EST. PATIENT-LVL III: ICD-10-PCS | Mod: PBBFAC,,, | Performed by: PODIATRIST

## 2020-02-06 PROCEDURE — 11056 PARNG/CUTG B9 HYPRKR LES 2-4: CPT | Mod: Q9,PBBFAC | Performed by: PODIATRIST

## 2020-02-06 PROCEDURE — 99499 UNLISTED E&M SERVICE: CPT | Mod: S$PBB,,, | Performed by: PODIATRIST

## 2020-02-06 PROCEDURE — 99213 OFFICE O/P EST LOW 20 MIN: CPT | Mod: PBBFAC,25 | Performed by: PODIATRIST

## 2020-02-06 PROCEDURE — 11056 PARNG/CUTG B9 HYPRKR LES 2-4: CPT | Mod: Q9,S$PBB,, | Performed by: PODIATRIST

## 2020-02-06 PROCEDURE — 11721 DEBRIDE NAIL 6 OR MORE: CPT | Mod: Q9,59,S$PBB, | Performed by: PODIATRIST

## 2020-02-06 PROCEDURE — 11721 PR DEBRIDEMENT OF NAILS, 6 OR MORE: ICD-10-PCS | Mod: Q9,59,S$PBB, | Performed by: PODIATRIST

## 2020-02-06 PROCEDURE — 99999 PR PBB SHADOW E&M-EST. PATIENT-LVL III: CPT | Mod: PBBFAC,,, | Performed by: PODIATRIST

## 2020-02-06 NOTE — PROGRESS NOTES
Subjective:      Patient ID: Indio Ryder Jr. is a 49 y.o. male.    Chief Complaint: Diabetic Foot Exam (12/16/19 dr lee fajardo)    Indio is a 49 y.o. male who presents to the clinic for evaluation and treatment of high risk feet. 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 The patient's chief complaint is long, thick toenails. This patient has documented high risk feet requiring routine maintenance secondary to diabetes mellitis and those secondary complications of diabetes, as mentioned..    PCP: Lorena Thakur MD    Date Last Seen by PCP:   Chief Complaint   Patient presents with    Diabetic Foot Exam     12/16/19 dr lee fajardo           Hemoglobin A1C   Date Value Ref Range Status   12/03/2019 8.4 (H) 4.0 - 5.6 % Final     Comment:     ADA Screening Guidelines:  5.7-6.4%  Consistent with prediabetes  >or=6.5%  Consistent with diabetes  High levels of fetal hemoglobin interfere with the HbA1C  assay. Heterozygous hemoglobin variants (HbS, HgC, etc)do  not significantly interfere with this assay.   However, presence of multiple variants may affect accuracy.     09/12/2019 7.9 (H) 4.0 - 5.6 % Final     Comment:     ADA Screening Guidelines:  5.7-6.4%  Consistent with prediabetes  >or=6.5%  Consistent with diabetes  High levels of fetal hemoglobin interfere with the HbA1C  assay. Heterozygous hemoglobin variants (HbS,  HgC, etc)do  not significantly interfere with this assay.   However, presence of multiple variants may affect accuracy.     07/12/2019 >14.0 (H) 4.0 - 5.6 % Final     Comment:     ADA Screening Guidelines:  5.7-6.4%  Consistent with prediabetes  >or=6.5%  Consistent with diabetes  High levels of fetal hemoglobin interfere with the HbA1C  assay. Heterozygous hemoglobin variants (HbS, HgC, etc)do  not significantly interfere with this assay.   However, presence of multiple variants may affect accuracy.         Review of Systems   Constitution: Negative for chills, decreased appetite and fever.   Cardiovascular: Positive for leg swelling.   Skin: Positive for dry skin, nail changes and poor wound healing.   Musculoskeletal: Positive for arthritis. Negative for joint pain, joint swelling and myalgias.   Gastrointestinal: Negative for nausea and vomiting.   Neurological: Negative for loss of balance, numbness and paresthesias.           Objective:      Physical Exam   Constitutional: He is oriented to person, place, and time. He appears well-developed and well-nourished.   Cardiovascular:   Dorsalis pedis and posterior tibial pulses are diminished Bilaterally. Toes are cool to touch. Feet are warm proximally.There is decreased digital hair. Skin is atrophic, slightly hyperpigmented, and mildly edematous       Musculoskeletal: Normal range of motion. He exhibits no edema or tenderness.   Adequate joint range of motion without pain, limitation, nor crepitation Bilateral feet and ankle joints. Muscle strength is 5/5 in all groups bilaterally.    S/p b/l 5th ray amp      Neurological: He is alert and oriented to person, place, and time.   Stephenson-Nenita 5.07 monofilamant testing is diminished Kenji feet. Sharp/dull sensation diminished Bilaterally. Light touch absent Bilaterally.       Skin: Skin is warm and dry. No ecchymosis and no lesion noted. No erythema.   Nails x8 are elongated by  1-3mm's, thickened by 2-4 mm's,  dystrophic, and are darkened in  coloration . Xerosis Bilaterally. No open lesions noted.  Hyperkeratotic tissue noted to plantar L foot x 2     Psychiatric: He has a normal mood and affect. His behavior is normal.             Assessment:       Encounter Diagnoses   Name Primary?    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 edema, with long-term current use of insulin Yes    Corn or callus     Onychomycosis due to dermatophyte          Plan:       Indio was seen today for diabetic foot exam.    Diagnoses and all orders for this visit:    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 edema, with long-term current use of insulin    Corn or callus    Onychomycosis due to dermatophyte      I counseled the patient on his conditions, their implications and medical management.        - Shoe inspection. Diabetic Foot Education. Patient reminded of the importance of good nutrition and blood sugar control to help prevent podiatric complications of diabetes. Patient instructed on proper foot hygeine. We discussed wearing proper shoe gear, daily foot inspections, never walking without protective shoe gear, never putting sharp instruments to feet, routine podiatric nail visits every 2-3 months.      - With patient's permission, nails were aggressively reduced and debrided x 8 to their soft tissue attachment mechanically and with electric , removing all offending nail and debris. Patient relates relief following the procedure. He will continue to monitor the areas daily, inspect his feet, wear protective shoe gear when ambulatory, moisturizer to maintain skin integrity and follow in this office in approximately 2-3 months, sooner p.r.n.    - After cleansing the  area w/ alcohol prep pad the above mentioned hyperkeratosis was trimmed utilizing No 15 scapel, to a smooth base with out  incident. Patient tolerated this  well and reported comfort x 2

## 2020-03-05 ENCOUNTER — PATIENT OUTREACH (OUTPATIENT)
Dept: ADMINISTRATIVE | Facility: OTHER | Age: 50
End: 2020-03-05

## 2020-03-05 ENCOUNTER — OFFICE VISIT (OUTPATIENT)
Dept: PODIATRY | Facility: CLINIC | Age: 50
End: 2020-03-05
Payer: MEDICAID

## 2020-03-05 ENCOUNTER — LAB VISIT (OUTPATIENT)
Dept: LAB | Facility: HOSPITAL | Age: 50
End: 2020-03-05
Attending: PODIATRIST
Payer: MEDICAID

## 2020-03-05 VITALS
HEIGHT: 73 IN | BODY MASS INDEX: 29.03 KG/M2 | HEART RATE: 104 BPM | RESPIRATION RATE: 18 BRPM | TEMPERATURE: 98 F | WEIGHT: 219 LBS | DIASTOLIC BLOOD PRESSURE: 89 MMHG | SYSTOLIC BLOOD PRESSURE: 145 MMHG

## 2020-03-05 DIAGNOSIS — L97.524 FOOT ULCER, LEFT, WITH NECROSIS OF BONE: ICD-10-CM

## 2020-03-05 DIAGNOSIS — L97.524 FOOT ULCER, LEFT, WITH NECROSIS OF BONE: Primary | ICD-10-CM

## 2020-03-05 LAB
BASOPHILS # BLD AUTO: 0.02 K/UL (ref 0–0.2)
BASOPHILS NFR BLD: 0.2 % (ref 0–1.9)
DIFFERENTIAL METHOD: ABNORMAL
EOSINOPHIL # BLD AUTO: 0 K/UL (ref 0–0.5)
EOSINOPHIL NFR BLD: 0.2 % (ref 0–8)
ERYTHROCYTE [DISTWIDTH] IN BLOOD BY AUTOMATED COUNT: 12.5 % (ref 11.5–14.5)
ERYTHROCYTE [SEDIMENTATION RATE] IN BLOOD BY WESTERGREN METHOD: 35 MM/HR (ref 0–23)
HCT VFR BLD AUTO: 42.3 % (ref 40–54)
HGB BLD-MCNC: 12.8 G/DL (ref 14–18)
IMM GRANULOCYTES # BLD AUTO: 0.02 K/UL (ref 0–0.04)
IMM GRANULOCYTES NFR BLD AUTO: 0.2 % (ref 0–0.5)
LYMPHOCYTES # BLD AUTO: 1.4 K/UL (ref 1–4.8)
LYMPHOCYTES NFR BLD: 17.1 % (ref 18–48)
MCH RBC QN AUTO: 29.2 PG (ref 27–31)
MCHC RBC AUTO-ENTMCNC: 30.3 G/DL (ref 32–36)
MCV RBC AUTO: 96 FL (ref 82–98)
MONOCYTES # BLD AUTO: 0.7 K/UL (ref 0.3–1)
MONOCYTES NFR BLD: 8.3 % (ref 4–15)
NEUTROPHILS # BLD AUTO: 6.1 K/UL (ref 1.8–7.7)
NEUTROPHILS NFR BLD: 74 % (ref 38–73)
NRBC BLD-RTO: 0 /100 WBC
PLATELET # BLD AUTO: 350 K/UL (ref 150–350)
PMV BLD AUTO: 10.9 FL (ref 9.2–12.9)
RBC # BLD AUTO: 4.39 M/UL (ref 4.6–6.2)
WBC # BLD AUTO: 8.29 K/UL (ref 3.9–12.7)

## 2020-03-05 PROCEDURE — 11044 DBRDMT BONE 1ST 20 SQ CM/<: CPT | Mod: PBBFAC | Performed by: PODIATRIST

## 2020-03-05 PROCEDURE — 99214 PR OFFICE/OUTPT VISIT, EST, LEVL IV, 30-39 MIN: ICD-10-PCS | Mod: 25,S$PBB,, | Performed by: PODIATRIST

## 2020-03-05 PROCEDURE — 87070 CULTURE OTHR SPECIMN AEROBIC: CPT | Mod: 59

## 2020-03-05 PROCEDURE — 87176 TISSUE HOMOGENIZATION CULTR: CPT

## 2020-03-05 PROCEDURE — 99214 OFFICE O/P EST MOD 30 MIN: CPT | Mod: 25,S$PBB,, | Performed by: PODIATRIST

## 2020-03-05 PROCEDURE — 88305 TISSUE EXAM BY PATHOLOGIST: ICD-10-PCS | Mod: 26,,, | Performed by: PATHOLOGY

## 2020-03-05 PROCEDURE — 99999 PR PBB SHADOW E&M-EST. PATIENT-LVL IV: ICD-10-PCS | Mod: PBBFAC,,, | Performed by: PODIATRIST

## 2020-03-05 PROCEDURE — 88305 TISSUE EXAM BY PATHOLOGIST: CPT | Mod: 26,,, | Performed by: PATHOLOGY

## 2020-03-05 PROCEDURE — 11044 PR DEBRIDEMENT, SKIN, SUB-Q TISSUE,MUSCLE,BONE,=<20 SQ CM: ICD-10-PCS | Mod: S$PBB,,, | Performed by: PODIATRIST

## 2020-03-05 PROCEDURE — 99999 PR PBB SHADOW E&M-EST. PATIENT-LVL IV: CPT | Mod: PBBFAC,,, | Performed by: PODIATRIST

## 2020-03-05 PROCEDURE — 87077 CULTURE AEROBIC IDENTIFY: CPT | Mod: 59

## 2020-03-05 PROCEDURE — 87075 CULTR BACTERIA EXCEPT BLOOD: CPT | Mod: 59

## 2020-03-05 PROCEDURE — 88305 TISSUE EXAM BY PATHOLOGIST: CPT | Performed by: PATHOLOGY

## 2020-03-05 PROCEDURE — 86140 C-REACTIVE PROTEIN: CPT

## 2020-03-05 PROCEDURE — 36415 COLL VENOUS BLD VENIPUNCTURE: CPT

## 2020-03-05 PROCEDURE — 99214 OFFICE O/P EST MOD 30 MIN: CPT | Mod: PBBFAC | Performed by: PODIATRIST

## 2020-03-05 PROCEDURE — 85025 COMPLETE CBC W/AUTO DIFF WBC: CPT

## 2020-03-05 PROCEDURE — 87186 SC STD MICRODIL/AGAR DIL: CPT

## 2020-03-05 PROCEDURE — 80053 COMPREHEN METABOLIC PANEL: CPT

## 2020-03-05 PROCEDURE — 85652 RBC SED RATE AUTOMATED: CPT

## 2020-03-05 PROCEDURE — 11044 DBRDMT BONE 1ST 20 SQ CM/<: CPT | Mod: S$PBB,,, | Performed by: PODIATRIST

## 2020-03-05 RX ORDER — DOXYCYCLINE 100 MG/1
100 CAPSULE ORAL EVERY 12 HOURS
Qty: 20 CAPSULE | Refills: 10 | Status: ON HOLD | OUTPATIENT
Start: 2020-03-05 | End: 2020-03-30 | Stop reason: HOSPADM

## 2020-03-05 NOTE — PROGRESS NOTES
Subjective:      Patient ID: Indio Ryder Jr. is a 49 y.o. male.    Chief Complaint: walk in (new wound ); Foot Ulcer (left foot ); and Foot Swelling (warm to touch )    Indio is a 49 y.o. male who presents to the clinic for evaluation and treatment of high risk feet. 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 The patient's chief complaint is new wound L foot x several days. States he began feeling ill for [employer]. He had low BP while at a parade. Ems evaluated hi m . He states BP around 83/52 at that time. Currently feels better. Reports today as walk in for foot eval  This patient has documented high risk feet requiring routine maintenance secondary to diabetes mellitis and those secondary complications of diabetes, as mentioned..    PCP: Lorena Thakur MD    Date Last Seen by PCP:   Chief Complaint   Patient presents with    walk in     new wound     Foot Ulcer     left foot     Foot Swelling     warm to touch            Hemoglobin A1C   Date Value Ref Range Status   12/03/2019 8.4 (H) 4.0 - 5.6 % Final     Comment:     ADA Screening Guidelines:  5.7-6.4%  Consistent with prediabetes  >or=6.5%  Consistent with diabetes  High levels of fetal hemoglobin interfere with the HbA1C  assay. Heterozygous hemoglobin variants (HbS, HgC, etc)do  not significantly interfere with this assay.   However, presence of multiple  "variants may affect accuracy.     09/12/2019 7.9 (H) 4.0 - 5.6 % Final     Comment:     ADA Screening Guidelines:  5.7-6.4%  Consistent with prediabetes  >or=6.5%  Consistent with diabetes  High levels of fetal hemoglobin interfere with the HbA1C  assay. Heterozygous hemoglobin variants (HbS, HgC, etc)do  not significantly interfere with this assay.   However, presence of multiple variants may affect accuracy.     07/12/2019 >14.0 (H) 4.0 - 5.6 % Final     Comment:     ADA Screening Guidelines:  5.7-6.4%  Consistent with prediabetes  >or=6.5%  Consistent with diabetes  High levels of fetal hemoglobin interfere with the HbA1C  assay. Heterozygous hemoglobin variants (HbS, HgC, etc)do  not significantly interfere with this assay.   However, presence of multiple variants may affect accuracy.         Review of Systems   Constitution: Positive for malaise/fatigue. Negative for chills, decreased appetite and fever.   Skin: Positive for dry skin, nail changes and poor wound healing. Negative for color change, flushing and itching.   Musculoskeletal: Positive for arthritis. Negative for joint pain, joint swelling and myalgias.   Gastrointestinal: Negative for nausea and vomiting.   Neurological: Negative for loss of balance, numbness and paresthesias.           Objective:       Vitals:    03/05/20 1512   BP: (!) 145/89   Pulse: 104   Resp: 18   Temp: 98.1 °F (36.7 °C)   TempSrc: Oral   Weight: 99.3 kg (219 lb)   Height: 6' 1" (1.854 m)   PainSc: 0-No pain        Physical Exam   Constitutional: He is oriented to person, place, and time. He appears well-developed and well-nourished.   Cardiovascular:   Dorsalis pedis and posterior tibial pulses are diminished Bilaterally. Toes are cool to touch. Feet are warm proximally.There is decreased digital hair. Skin is atrophic, slightly hyperpigmented, and mildly edematous       Musculoskeletal: Normal range of motion. He exhibits no edema or tenderness.   Adequate joint range of " motion without pain, limitation, nor crepitation Bilateral feet and ankle joints. Muscle strength is 5/5 in all groups bilaterally.    S/p b/l 5th ray amp      Neurological: He is alert and oriented to person, place, and time.   Wilmot-Nenita 5.07 monofilamant testing is diminished Kenji feet. Sharp/dull sensation diminished Bilaterally. Light touch absent Bilaterally.       Skin: Skin is warm and dry. No ecchymosis and no lesion noted. No erythema.   Ulcer location:  left, Sub MPJ   Pre debridement Measurements: 4.0x3.6x0.2 cm   Post debridement Measurements : 4.0x3.7x0.7 cm   Signs of infection: Yes  Erythema: Yes  Undermining: No  Tunneling: No  Drainage: Serosanguineous  Periwound skin: macerated  Wound Base: granular w/ central exposed bone      Psychiatric: He has a normal mood and affect. His behavior is normal.   Vitals reviewed.                Assessment:       Encounter Diagnosis   Name Primary?    Foot ulcer, left, with necrosis of bone Yes         Plan:       Indio was seen today for walk in, foot ulcer and foot swelling.    Diagnoses and all orders for this visit:    Foot ulcer, left, with necrosis of bone  -     Aerobic culture  -     Culture, Anaerobic  -     Aerobic culture  -     Culture, Anaerobic  -     Specimen to Pathology Other  -     CBC auto differential; Future  -     Comprehensive metabolic panel; Future  -     Sedimentation rate; Future  -     C-reactive protein; Future  -     X-Ray Foot Complete Left; Future    Other orders  -     doxycycline (MONODOX) 100 MG capsule; Take 1 capsule (100 mg total) by mouth every 12 (twelve) hours.      I counseled the patient on his conditions, their implications and medical management.      Aerobic/anaerobic cultures obtained from wound. Prescription written for broad spectrum abx, will monitor results and tailor abx as needed.     Labs/xray ordered     Bone sent for micro/path     Wound cleansed with Vashe    Debridement: With verbal consent,  nonviable tissues on the left foot were debrided beyond bone utilizing a  sterile No. 3 scalpel and forceps. Minimal bleeding controlled with direct pressure  The patient tolerated this well.     Dressings: Marlen NICKERSON  Offloading:Alfredo Gonzáles MA assisted w/ application of football dressing under my direct supervision. Darco shoe applied to offload the area. Advised patient that this should be worn on the affected foot at all times when ambulating       Follow-up:Patient is to return to the clinic in 1 week  for follow-up but should call [facility] immediately if any signs of infection, such as fever, chills, sweats, increased redness or pain.    Short-term goals include maintaining good offloading and minimizing bioburden, promoting granulation and epithelialization to healing.  Long-term goals include keeping the wound healed by good offloading and medical management under the direction of internist.

## 2020-03-06 ENCOUNTER — TELEPHONE (OUTPATIENT)
Dept: PODIATRY | Facility: CLINIC | Age: 50
End: 2020-03-06

## 2020-03-06 LAB
ALBUMIN SERPL BCP-MCNC: 3 G/DL (ref 3.5–5.2)
ALP SERPL-CCNC: 107 U/L (ref 55–135)
ALT SERPL W/O P-5'-P-CCNC: 9 U/L (ref 10–44)
ANION GAP SERPL CALC-SCNC: 14 MMOL/L (ref 8–16)
AST SERPL-CCNC: 10 U/L (ref 10–40)
BILIRUB SERPL-MCNC: 0.7 MG/DL (ref 0.1–1)
BUN SERPL-MCNC: 9 MG/DL (ref 6–20)
CALCIUM SERPL-MCNC: 9.8 MG/DL (ref 8.7–10.5)
CHLORIDE SERPL-SCNC: 98 MMOL/L (ref 95–110)
CO2 SERPL-SCNC: 27 MMOL/L (ref 23–29)
CREAT SERPL-MCNC: 1.1 MG/DL (ref 0.5–1.4)
CRP SERPL-MCNC: 75.3 MG/L (ref 0–8.2)
EST. GFR  (AFRICAN AMERICAN): >60 ML/MIN/1.73 M^2
EST. GFR  (NON AFRICAN AMERICAN): >60 ML/MIN/1.73 M^2
GLUCOSE SERPL-MCNC: 241 MG/DL (ref 70–110)
POTASSIUM SERPL-SCNC: 3.8 MMOL/L (ref 3.5–5.1)
PROT SERPL-MCNC: 8.4 G/DL (ref 6–8.4)
SODIUM SERPL-SCNC: 139 MMOL/L (ref 136–145)

## 2020-03-06 NOTE — TELEPHONE ENCOUNTER
----- Message from Stefan Parrish sent at 3/6/2020 12:48 PM CST -----  Contact: [facility]  Mg  Verify the bottle     Contact: [phone]

## 2020-03-06 NOTE — TELEPHONE ENCOUNTER
Called [facility] back and they wanted to know from where the specimen is coming from, if its coming from the foot. I told them yes its coming from patients foot

## 2020-03-07 LAB
BACTERIA SPEC AEROBE CULT: ABNORMAL
BACTERIA SPEC AEROBE CULT: ABNORMAL

## 2020-03-10 ENCOUNTER — HOSPITAL ENCOUNTER (INPATIENT)
Facility: HOSPITAL | Age: 50
LOS: 20 days | Discharge: HOME | DRG: 622 | End: 2020-03-30
Attending: EMERGENCY MEDICINE | Admitting: EMERGENCY MEDICINE
Payer: MEDICAID

## 2020-03-10 DIAGNOSIS — U07.1 COVID-19 VIRUS INFECTION: ICD-10-CM

## 2020-03-10 DIAGNOSIS — L97.426 DIABETIC ULCER OF LEFT MIDFOOT ASSOCIATED WITH TYPE 2 DIABETES MELLITUS, WITH BONE INVOLVEMENT WITHOUT EVIDENCE OF NECROSIS: ICD-10-CM

## 2020-03-10 DIAGNOSIS — E11.621 DIABETIC ULCER OF LEFT MIDFOOT ASSOCIATED WITH TYPE 2 DIABETES MELLITUS, WITH BONE INVOLVEMENT WITHOUT EVIDENCE OF NECROSIS: ICD-10-CM

## 2020-03-10 DIAGNOSIS — E86.0 DEHYDRATION: ICD-10-CM

## 2020-03-10 DIAGNOSIS — E11.10 DIABETIC KETOACIDOSIS WITHOUT COMA ASSOCIATED WITH TYPE 2 DIABETES MELLITUS: Primary | ICD-10-CM

## 2020-03-10 DIAGNOSIS — L97.529: ICD-10-CM

## 2020-03-10 DIAGNOSIS — R78.81 BACTEREMIA: ICD-10-CM

## 2020-03-10 DIAGNOSIS — Z79.4 TYPE 2 DIABETES MELLITUS WITH HYPERGLYCEMIA, WITH LONG-TERM CURRENT USE OF INSULIN: ICD-10-CM

## 2020-03-10 DIAGNOSIS — E11.65 TYPE 2 DIABETES MELLITUS WITH HYPERGLYCEMIA, WITH LONG-TERM CURRENT USE OF INSULIN: ICD-10-CM

## 2020-03-10 DIAGNOSIS — M86.9 OSTEOMYELITIS OF LEFT FOOT, UNSPECIFIED TYPE: ICD-10-CM

## 2020-03-10 DIAGNOSIS — R94.31 QT PROLONGATION: ICD-10-CM

## 2020-03-10 DIAGNOSIS — L08.9 LEFT FOOT INFECTION: ICD-10-CM

## 2020-03-10 DIAGNOSIS — Z91.89 AT RISK FOR LONG QT SYNDROME: ICD-10-CM

## 2020-03-10 DIAGNOSIS — R78.81 BACTEREMIA DUE TO GRAM-POSITIVE BACTERIA: ICD-10-CM

## 2020-03-10 DIAGNOSIS — Z20.822 SUSPECTED COVID-19 VIRUS INFECTION: ICD-10-CM

## 2020-03-10 LAB
ALBUMIN SERPL BCP-MCNC: 2.1 G/DL (ref 3.5–5.2)
ALBUMIN SERPL BCP-MCNC: 2.2 G/DL (ref 3.5–5.2)
ALBUMIN SERPL BCP-MCNC: 2.3 G/DL (ref 3.5–5.2)
ALBUMIN SERPL BCP-MCNC: 2.7 G/DL (ref 3.5–5.2)
ALP SERPL-CCNC: 73 U/L (ref 55–135)
ALP SERPL-CCNC: 74 U/L (ref 55–135)
ALP SERPL-CCNC: 79 U/L (ref 55–135)
ALP SERPL-CCNC: 95 U/L (ref 55–135)
ALT SERPL W/O P-5'-P-CCNC: 6 U/L (ref 10–44)
ALT SERPL W/O P-5'-P-CCNC: 7 U/L (ref 10–44)
ALT SERPL W/O P-5'-P-CCNC: 7 U/L (ref 10–44)
ALT SERPL W/O P-5'-P-CCNC: 9 U/L (ref 10–44)
ANION GAP SERPL CALC-SCNC: 12 MMOL/L (ref 8–16)
ANION GAP SERPL CALC-SCNC: 12 MMOL/L (ref 8–16)
ANION GAP SERPL CALC-SCNC: 18 MMOL/L (ref 8–16)
ANION GAP SERPL CALC-SCNC: 9 MMOL/L (ref 8–16)
AST SERPL-CCNC: 14 U/L (ref 10–40)
AST SERPL-CCNC: 14 U/L (ref 10–40)
AST SERPL-CCNC: 15 U/L (ref 10–40)
AST SERPL-CCNC: 15 U/L (ref 10–40)
B-OH-BUTYR BLD STRIP-SCNC: 3.6 MMOL/L (ref 0–0.5)
BACTERIA SPEC ANAEROBE CULT: NORMAL
BACTERIA SPEC ANAEROBE CULT: NORMAL
BILIRUB SERPL-MCNC: 0.2 MG/DL (ref 0.1–1)
BILIRUB SERPL-MCNC: 0.2 MG/DL (ref 0.1–1)
BILIRUB SERPL-MCNC: 0.3 MG/DL (ref 0.1–1)
BILIRUB SERPL-MCNC: 0.4 MG/DL (ref 0.1–1)
BUN SERPL-MCNC: 14 MG/DL (ref 6–20)
BUN SERPL-MCNC: 15 MG/DL (ref 6–20)
BUN SERPL-MCNC: 15 MG/DL (ref 6–20)
BUN SERPL-MCNC: 18 MG/DL (ref 6–20)
CALCIUM SERPL-MCNC: 8.4 MG/DL (ref 8.7–10.5)
CALCIUM SERPL-MCNC: 8.7 MG/DL (ref 8.7–10.5)
CALCIUM SERPL-MCNC: 8.8 MG/DL (ref 8.7–10.5)
CALCIUM SERPL-MCNC: 9.6 MG/DL (ref 8.7–10.5)
CHLORIDE SERPL-SCNC: 104 MMOL/L (ref 95–110)
CHLORIDE SERPL-SCNC: 106 MMOL/L (ref 95–110)
CHLORIDE SERPL-SCNC: 106 MMOL/L (ref 95–110)
CHLORIDE SERPL-SCNC: 98 MMOL/L (ref 95–110)
CO2 SERPL-SCNC: 22 MMOL/L (ref 23–29)
CO2 SERPL-SCNC: 24 MMOL/L (ref 23–29)
CO2 SERPL-SCNC: 25 MMOL/L (ref 23–29)
CO2 SERPL-SCNC: 27 MMOL/L (ref 23–29)
CREAT SERPL-MCNC: 1.1 MG/DL (ref 0.5–1.4)
CREAT SERPL-MCNC: 1.4 MG/DL (ref 0.5–1.4)
ERYTHROCYTE [DISTWIDTH] IN BLOOD BY AUTOMATED COUNT: 12.3 % (ref 11.5–14.5)
EST. GFR  (AFRICAN AMERICAN): >60 ML/MIN/1.73 M^2
EST. GFR  (NON AFRICAN AMERICAN): 57.8 ML/MIN/1.73 M^2
EST. GFR  (NON AFRICAN AMERICAN): >60 ML/MIN/1.73 M^2
GLUCOSE SERPL-MCNC: 186 MG/DL (ref 70–110)
GLUCOSE SERPL-MCNC: 219 MG/DL (ref 70–110)
GLUCOSE SERPL-MCNC: 256 MG/DL (ref 70–110)
GLUCOSE SERPL-MCNC: 342 MG/DL (ref 70–110)
GLUCOSE SERPL-MCNC: 382 MG/DL (ref 70–110)
HCT VFR BLD AUTO: 40.8 % (ref 40–54)
HGB BLD-MCNC: 12.7 G/DL (ref 14–18)
LACTATE SERPL-SCNC: 1.2 MMOL/L (ref 0.5–2.2)
MAGNESIUM SERPL-MCNC: 1.8 MG/DL (ref 1.6–2.6)
MCH RBC QN AUTO: 28.9 PG (ref 27–31)
MCHC RBC AUTO-ENTMCNC: 31.1 G/DL (ref 32–36)
MCV RBC AUTO: 93 FL (ref 82–98)
PHOSPHATE SERPL-MCNC: 2 MG/DL (ref 2.7–4.5)
PHOSPHATE SERPL-MCNC: 2.2 MG/DL (ref 2.7–4.5)
PHOSPHATE SERPL-MCNC: 3 MG/DL (ref 2.7–4.5)
PLATELET # BLD AUTO: 327 K/UL (ref 150–350)
PMV BLD AUTO: 10.3 FL (ref 9.2–12.9)
POCT GLUCOSE: 152 MG/DL (ref 70–110)
POCT GLUCOSE: 167 MG/DL (ref 70–110)
POCT GLUCOSE: 185 MG/DL (ref 70–110)
POCT GLUCOSE: 187 MG/DL (ref 70–110)
POCT GLUCOSE: 216 MG/DL (ref 70–110)
POCT GLUCOSE: 238 MG/DL (ref 70–110)
POCT GLUCOSE: 266 MG/DL (ref 70–110)
POCT GLUCOSE: 293 MG/DL (ref 70–110)
POCT GLUCOSE: 307 MG/DL (ref 70–110)
POCT GLUCOSE: 324 MG/DL (ref 70–110)
POCT GLUCOSE: 333 MG/DL (ref 70–110)
POCT GLUCOSE: 374 MG/DL (ref 70–110)
POTASSIUM SERPL-SCNC: 3.6 MMOL/L (ref 3.5–5.1)
POTASSIUM SERPL-SCNC: 3.7 MMOL/L (ref 3.5–5.1)
POTASSIUM SERPL-SCNC: 3.7 MMOL/L (ref 3.5–5.1)
POTASSIUM SERPL-SCNC: 3.8 MMOL/L (ref 3.5–5.1)
PROT SERPL-MCNC: 7.1 G/DL (ref 6–8.4)
PROT SERPL-MCNC: 7.4 G/DL (ref 6–8.4)
PROT SERPL-MCNC: 7.8 G/DL (ref 6–8.4)
PROT SERPL-MCNC: 9.1 G/DL (ref 6–8.4)
RBC # BLD AUTO: 4.4 M/UL (ref 4.6–6.2)
SODIUM SERPL-SCNC: 138 MMOL/L (ref 136–145)
SODIUM SERPL-SCNC: 140 MMOL/L (ref 136–145)
SODIUM SERPL-SCNC: 142 MMOL/L (ref 136–145)
SODIUM SERPL-SCNC: 143 MMOL/L (ref 136–145)
WBC # BLD AUTO: 6.95 K/UL (ref 3.9–12.7)

## 2020-03-10 PROCEDURE — 63600175 PHARM REV CODE 636 W HCPCS: Performed by: STUDENT IN AN ORGANIZED HEALTH CARE EDUCATION/TRAINING PROGRAM

## 2020-03-10 PROCEDURE — 84100 ASSAY OF PHOSPHORUS: CPT | Mod: 91

## 2020-03-10 PROCEDURE — 87040 BLOOD CULTURE FOR BACTERIA: CPT | Mod: 59

## 2020-03-10 PROCEDURE — 85027 COMPLETE CBC AUTOMATED: CPT

## 2020-03-10 PROCEDURE — 63600175 PHARM REV CODE 636 W HCPCS: Performed by: EMERGENCY MEDICINE

## 2020-03-10 PROCEDURE — 93005 ELECTROCARDIOGRAM TRACING: CPT

## 2020-03-10 PROCEDURE — 25000003 PHARM REV CODE 250: Performed by: STUDENT IN AN ORGANIZED HEALTH CARE EDUCATION/TRAINING PROGRAM

## 2020-03-10 PROCEDURE — 99223 PR INITIAL HOSPITAL CARE,LEVL III: ICD-10-PCS | Mod: ,,, | Performed by: STUDENT IN AN ORGANIZED HEALTH CARE EDUCATION/TRAINING PROGRAM

## 2020-03-10 PROCEDURE — 80053 COMPREHEN METABOLIC PANEL: CPT

## 2020-03-10 PROCEDURE — 99285 EMERGENCY DEPT VISIT HI MDM: CPT | Mod: 25

## 2020-03-10 PROCEDURE — 25000003 PHARM REV CODE 250: Performed by: EMERGENCY MEDICINE

## 2020-03-10 PROCEDURE — 96361 HYDRATE IV INFUSION ADD-ON: CPT

## 2020-03-10 PROCEDURE — 82010 KETONE BODYS QUAN: CPT

## 2020-03-10 PROCEDURE — 83605 ASSAY OF LACTIC ACID: CPT

## 2020-03-10 PROCEDURE — 96375 TX/PRO/DX INJ NEW DRUG ADDON: CPT

## 2020-03-10 PROCEDURE — 99291 PR CRITICAL CARE, E/M 30-74 MINUTES: ICD-10-PCS | Mod: ,,, | Performed by: EMERGENCY MEDICINE

## 2020-03-10 PROCEDURE — 96368 THER/DIAG CONCURRENT INF: CPT

## 2020-03-10 PROCEDURE — 93010 ELECTROCARDIOGRAM REPORT: CPT | Mod: ,,, | Performed by: INTERNAL MEDICINE

## 2020-03-10 PROCEDURE — S0030 INJECTION, METRONIDAZOLE: HCPCS | Performed by: STUDENT IN AN ORGANIZED HEALTH CARE EDUCATION/TRAINING PROGRAM

## 2020-03-10 PROCEDURE — 80053 COMPREHEN METABOLIC PANEL: CPT | Mod: 91

## 2020-03-10 PROCEDURE — 96365 THER/PROPH/DIAG IV INF INIT: CPT

## 2020-03-10 PROCEDURE — 99223 1ST HOSP IP/OBS HIGH 75: CPT | Mod: ,,, | Performed by: STUDENT IN AN ORGANIZED HEALTH CARE EDUCATION/TRAINING PROGRAM

## 2020-03-10 PROCEDURE — 93010 EKG 12-LEAD: ICD-10-PCS | Mod: ,,, | Performed by: INTERNAL MEDICINE

## 2020-03-10 PROCEDURE — 99291 CRITICAL CARE FIRST HOUR: CPT | Mod: ,,, | Performed by: EMERGENCY MEDICINE

## 2020-03-10 PROCEDURE — 83735 ASSAY OF MAGNESIUM: CPT

## 2020-03-10 PROCEDURE — 82962 GLUCOSE BLOOD TEST: CPT

## 2020-03-10 PROCEDURE — 20600001 HC STEP DOWN PRIVATE ROOM

## 2020-03-10 PROCEDURE — 84100 ASSAY OF PHOSPHORUS: CPT

## 2020-03-10 RX ORDER — POTASSIUM CHLORIDE 20 MEQ/15ML
40 SOLUTION ORAL
Status: DISCONTINUED | OUTPATIENT
Start: 2020-03-10 | End: 2020-03-13

## 2020-03-10 RX ORDER — IBUPROFEN 200 MG
24 TABLET ORAL
Status: DISCONTINUED | OUTPATIENT
Start: 2020-03-10 | End: 2020-03-30 | Stop reason: HOSPADM

## 2020-03-10 RX ORDER — SODIUM CHLORIDE 9 MG/ML
INJECTION, SOLUTION INTRAVENOUS CONTINUOUS
Status: DISCONTINUED | OUTPATIENT
Start: 2020-03-10 | End: 2020-03-10

## 2020-03-10 RX ORDER — METRONIDAZOLE 500 MG/100ML
500 INJECTION, SOLUTION INTRAVENOUS
Status: DISCONTINUED | OUTPATIENT
Start: 2020-03-10 | End: 2020-03-11

## 2020-03-10 RX ORDER — ACETAMINOPHEN 325 MG/1
650 TABLET ORAL EVERY 4 HOURS PRN
Status: DISCONTINUED | OUTPATIENT
Start: 2020-03-10 | End: 2020-03-11

## 2020-03-10 RX ORDER — HYDRALAZINE HYDROCHLORIDE 25 MG/1
25 TABLET, FILM COATED ORAL EVERY 4 HOURS PRN
Status: DISCONTINUED | OUTPATIENT
Start: 2020-03-10 | End: 2020-03-26

## 2020-03-10 RX ORDER — SODIUM CHLORIDE 0.9 % (FLUSH) 0.9 %
10 SYRINGE (ML) INJECTION
Status: DISCONTINUED | OUTPATIENT
Start: 2020-03-10 | End: 2020-03-11

## 2020-03-10 RX ORDER — ONDANSETRON 8 MG/1
8 TABLET, ORALLY DISINTEGRATING ORAL EVERY 8 HOURS PRN
Status: DISCONTINUED | OUTPATIENT
Start: 2020-03-10 | End: 2020-03-30 | Stop reason: HOSPADM

## 2020-03-10 RX ORDER — DEXTROSE MONOHYDRATE 100 MG/ML
1000 INJECTION, SOLUTION INTRAVENOUS
Status: DISCONTINUED | OUTPATIENT
Start: 2020-03-10 | End: 2020-03-11

## 2020-03-10 RX ORDER — ATORVASTATIN CALCIUM 20 MG/1
80 TABLET, FILM COATED ORAL DAILY
Status: DISCONTINUED | OUTPATIENT
Start: 2020-03-11 | End: 2020-03-21

## 2020-03-10 RX ORDER — ACETAMINOPHEN 325 MG/1
650 TABLET ORAL
Status: COMPLETED | OUTPATIENT
Start: 2020-03-10 | End: 2020-03-10

## 2020-03-10 RX ORDER — GLUCAGON 1 MG
1 KIT INJECTION
Status: DISCONTINUED | OUTPATIENT
Start: 2020-03-10 | End: 2020-03-24

## 2020-03-10 RX ORDER — SODIUM CHLORIDE 0.9 % (FLUSH) 0.9 %
10 SYRINGE (ML) INJECTION
Status: DISCONTINUED | OUTPATIENT
Start: 2020-03-10 | End: 2020-03-30 | Stop reason: SDUPTHER

## 2020-03-10 RX ORDER — SODIUM CHLORIDE AND POTASSIUM CHLORIDE 150; 900 MG/100ML; MG/100ML
1000 INJECTION, SOLUTION INTRAVENOUS
Status: COMPLETED | OUTPATIENT
Start: 2020-03-10 | End: 2020-03-10

## 2020-03-10 RX ORDER — ONDANSETRON 2 MG/ML
4 INJECTION INTRAMUSCULAR; INTRAVENOUS
Status: COMPLETED | OUTPATIENT
Start: 2020-03-10 | End: 2020-03-10

## 2020-03-10 RX ORDER — IBUPROFEN 200 MG
16 TABLET ORAL
Status: DISCONTINUED | OUTPATIENT
Start: 2020-03-10 | End: 2020-03-30 | Stop reason: HOSPADM

## 2020-03-10 RX ORDER — GABAPENTIN 300 MG/1
600 CAPSULE ORAL DAILY
Status: DISCONTINUED | OUTPATIENT
Start: 2020-03-11 | End: 2020-03-18

## 2020-03-10 RX ADMIN — ACETAMINOPHEN 650 MG: 325 TABLET ORAL at 11:03

## 2020-03-10 RX ADMIN — ONDANSETRON 4 MG: 2 INJECTION INTRAMUSCULAR; INTRAVENOUS at 11:03

## 2020-03-10 RX ADMIN — PIPERACILLIN AND TAZOBACTAM 4.5 G: 4; .5 INJECTION, POWDER, LYOPHILIZED, FOR SOLUTION INTRAVENOUS; PARENTERAL at 12:03

## 2020-03-10 RX ADMIN — SODIUM CHLORIDE AND POTASSIUM CHLORIDE 1000 ML: 9; 1.49 INJECTION, SOLUTION INTRAVENOUS at 12:03

## 2020-03-10 RX ADMIN — SODIUM CHLORIDE 2.2 UNITS/HR: 9 INJECTION, SOLUTION INTRAVENOUS at 11:03

## 2020-03-10 RX ADMIN — SODIUM CHLORIDE 1000 ML: 0.9 INJECTION, SOLUTION INTRAVENOUS at 11:03

## 2020-03-10 RX ADMIN — VANCOMYCIN HYDROCHLORIDE 1500 MG: 1.5 INJECTION, POWDER, LYOPHILIZED, FOR SOLUTION INTRAVENOUS at 12:03

## 2020-03-10 RX ADMIN — SODIUM PHOSPHATE, MONOBASIC, MONOHYDRATE 30 MMOL: 276; 142 INJECTION, SOLUTION INTRAVENOUS at 08:03

## 2020-03-10 RX ADMIN — SODIUM CHLORIDE 1.6 UNITS/HR: 9 INJECTION, SOLUTION INTRAVENOUS at 10:03

## 2020-03-10 RX ADMIN — SODIUM CHLORIDE 5 UNITS/HR: 9 INJECTION, SOLUTION INTRAVENOUS at 12:03

## 2020-03-10 RX ADMIN — CEFTRIAXONE 2 G: 2 INJECTION, SOLUTION INTRAVENOUS at 06:03

## 2020-03-10 RX ADMIN — METRONIDAZOLE 500 MG: 500 INJECTION, SOLUTION INTRAVENOUS at 05:03

## 2020-03-10 RX ADMIN — POTASSIUM CHLORIDE: 149 INJECTION, SOLUTION, CONCENTRATE INTRAVENOUS at 08:03

## 2020-03-10 NOTE — ED NOTES
Dr. LYONS with IM 5 at bedside VORB to increased saline with KCL infusion to 250 cc/hr then start saline infusion when that is completed. Urinal at bedside. IM 5 aware that pt still has not urinated yet- no further orders received.

## 2020-03-10 NOTE — ASSESSMENT & PLAN NOTE
-- Pt has a hx of poorly controled DM   -- hx of diabetic ulcer with multiple amputations   -- currently treated with doxycyline

## 2020-03-10 NOTE — ED NOTES
Meal tray noted at bedside. Pt did not eat any, states does not want. Meal tray removed from room. Dr. LYONS with IM5 made aware of current bS, stated would placed new orders.

## 2020-03-10 NOTE — ASSESSMENT & PLAN NOTE
- pt was started on losartan   - it was stopped because of hypotension episode   - keep holding

## 2020-03-10 NOTE — ASSESSMENT & PLAN NOTE
50 y/o with a history of poorly controlled DM2 ,diabetic neuropathy, diabatic retinopathy and diabetic foot with multiple amputation who presents to ED with chief complaint of chills associated with nausea and vomiting for one day.   He has a history of foot ulcer of left big toe who currently treated with doxycycline.    -- afebrile, vitally stable.  -- Lab, BS: 382, B-hydroxybutyrate 3.6, HCO3 22,  anion gap 18.  -- WBC: 6.9, lactate: 1.2  -- X-ray of right food shows soft tissue edema concerning for osteomyelitis  -- last Ha1c 8.4  -- wound cx 3/5 positive for staph aureus     Plan :  -- started on insuline gtt   -- started on IVF infusion 100cc/h  -- CMP q4, Phos q4   -- replete electrolyte   -- blood cx drawn, follow up   -- started on ceftriaxone and metronidazole

## 2020-03-10 NOTE — ED PROVIDER NOTES
Encounter Date: 3/10/2020       History     Chief Complaint   Patient presents with    Wound Infection     on antibiotics, vomiting, diabetic     Indio Ryder Jr. is a 49 y.o. male who  has a past medical history of Actinomyces infection (1/17/2017), Diabetic ketoacidosis without coma associated with type 2 diabetes mellitus (5/30/2017), Diabetic ulcer of right foot associated with type 2 diabetes mellitus (6/3/2015), Essential hypertension (6/6/2013), Group B streptococcal infection (1/13/2017), Mixed hyperlipidemia (8/12/2014), Shoulder impingement (8/12/2014), Subacute osteomyelitis of right foot (1/12/2017), Traumatic amputation of fifth toe of right foot (07/02/2015), Type 2 diabetes mellitus with diabetic neuropathy, with long-term current use of insulin (5/3/2016), and Ulcerative colitis, unspecified.    The patient presents to the ED due to L foot wound associated with N/V.  Patient reports he has had a decreased appetite for the last several days.  He has been unable to hold down food or fluids for the last 2 days.  He denies any associated fever or abdominal pain.   He also reports a wound on the left foot that appears to be worsening.  He was recently evaluated by podiatry and started on antibiotics.  He has a history of osteomyelitis and has had a prior amputation to that same foot in the past.  He denies any shortness of breath, back pain, or urinary complaints.  He has been compliant with all of his medications including insulin.        Review of patient's allergies indicates:  No Known Allergies  Past Medical History:   Diagnosis Date    Actinomyces infection 1/17/2017    Right foot    Diabetic ketoacidosis without coma associated with type 2 diabetes mellitus 5/30/2017    Diabetic ulcer of right foot associated with type 2 diabetes mellitus 6/3/2015    Essential hypertension 6/6/2013    Group B streptococcal infection 1/13/2017    Mixed hyperlipidemia 8/12/2014    Shoulder impingement  8/12/2014    Subacute osteomyelitis of right foot 1/12/2017    Streptococcus agalactiae, Actinomyces odontolyticus    Traumatic amputation of fifth toe of right foot 07/02/2015    Type 2 diabetes mellitus with diabetic neuropathy, with long-term current use of insulin 5/3/2016    Ulcerative colitis, unspecified      Past Surgical History:   Procedure Laterality Date    DEBRIDEMENT OF FOOT Left 7/14/2019    Procedure: DEBRIDEMENT, FOOT, with left 5th ray amputation;  Surgeon: Sis Hickman DPM;  Location: [facility] OR [address] [place] [address];  Service: Podiatry;  Laterality: Left;    DEBRIDEMENT OF FOOT Left 7/17/2019    Procedure: DEBRIDEMENT, FOOT with leftt 5th ray partial amputation with Neox Graft;  Surgeon: Mai Burrell DPM;  Location: [facility] OR [address] [place] [address];  Service: Podiatry;  Laterality: Left;  t    TOE AMPUTATION Right 06/05/2015    TOE AMPUTATION Right 01/19/2017     Family History   Adopted: Yes   Problem Relation Age of Onset    Hypertension Mother     Cancer Mother         breast    Diabetes Mother     Hypertension Father     Cataracts Father     Diabetes Sister     No Known Problems Brother     No Known Problems Sister     No Known Problems Sister     Hypertension Maternal Aunt     No Known Problems Maternal Uncle     No Known Problems Paternal Aunt     No Known Problems Paternal Uncle     No Known Problems Maternal Grandmother     No Known Problems Maternal Grandfather     No Known Problems Paternal Grandmother     No Known Problems Paternal Grandfather     Amblyopia Neg Hx     Blindness Neg Hx     Glaucoma Neg Hx     Macular degeneration Neg Hx     Retinal detachment Neg Hx     Strabismus Neg Hx     Stroke Neg Hx     Thyroid disease Neg Hx      Social History     Tobacco Use    Smoking status: Never Smoker    Smokeless tobacco: Never Used   Substance Use Topics    Alcohol use: No    Drug use: No     Review of Systems   Constitutional: Positive for appetite change and fatigue.  Negative for activity change, chills and fever.   HENT: Negative for sore throat.    Respiratory: Negative for shortness of breath.    Cardiovascular: Negative for chest pain.   Gastrointestinal: Positive for nausea and vomiting. Negative for constipation and diarrhea.   Genitourinary: Negative for dysuria, frequency and urgency.   Musculoskeletal: Positive for arthralgias and myalgias. Negative for back pain, neck pain and neck stiffness.   Skin: Negative for rash and wound.   Neurological: Negative for weakness and headaches.   Hematological: Does not bruise/bleed easily.   Psychiatric/Behavioral: Negative for agitation, behavioral problems and confusion.       Physical Exam     Initial Vitals   BP Pulse Resp Temp SpO2   03/10/20 1032 03/10/20 1032 03/10/20 1032 03/10/20 1156 03/10/20 1032   123/85 (!) 125 18 97.8 °F (36.6 °C) 98 %      MAP       --                Physical Exam    Nursing note and vitals reviewed.  Constitutional: He appears well-developed and well-nourished. He is not diaphoretic. No distress.   Appears uncomfortable and fatigued, no acute distress.   HENT:   Head: Normocephalic and atraumatic.   Mouth/Throat: Oropharynx is clear and moist.   Eyes: EOM are normal. Pupils are equal, round, and reactive to light.   Neck: No tracheal deviation present.   Cardiovascular: Regular rhythm, normal heart sounds and intact distal pulses. Tachycardia present.    Heart rate 120s.   Pulmonary/Chest: Breath sounds normal. No stridor. No respiratory distress.   Abdominal: Soft. He exhibits no distension and no mass. There is no tenderness.   Musculoskeletal: Normal range of motion. He exhibits no edema.        Feet:    Prior L 5th phalanx amputation  Large foot ulcer to plantar aspect of L foot overlying 5th.   Neurological: He is alert and oriented to person, place, and time. No cranial nerve deficit or sensory deficit.   Skin: Skin is warm and dry. Capillary refill takes less than 2 seconds. No rash noted.    Psychiatric: He has a normal mood and affect. His behavior is normal. Thought content normal.                 ED Course   Procedures  Labs Reviewed   CBC WITHOUT DIFFERENTIAL - Abnormal; Notable for the following components:       Result Value    RBC 4.40 (*)     Hemoglobin 12.7 (*)     Mean Corpuscular Hemoglobin Conc 31.1 (*)     All other components within normal limits   COMPREHENSIVE METABOLIC PANEL - Abnormal; Notable for the following components:    CO2 22 (*)     Glucose 382 (*)     Total Protein 9.1 (*)     Albumin 2.7 (*)     ALT 9 (*)     Anion Gap 18 (*)     eGFR if non  57.8 (*)     All other components within normal limits   BETA - HYDROXYBUTYRATE, SERUM - Abnormal; Notable for the following components:    Beta-Hydroxybutyrate 3.6 (*)     All other components within normal limits   COMPREHENSIVE METABOLIC PANEL - Abnormal; Notable for the following components:    Glucose 219 (*)     Albumin 2.3 (*)     ALT 7 (*)     All other components within normal limits   PHOSPHORUS - Abnormal; Notable for the following components:    Phosphorus 2.0 (*)     All other components within normal limits   COMPREHENSIVE METABOLIC PANEL - Abnormal; Notable for the following components:    Glucose 186 (*)     Albumin 2.2 (*)     ALT 7 (*)     All other components within normal limits   PHOSPHORUS - Abnormal; Notable for the following components:    Phosphorus 2.2 (*)     All other components within normal limits   POCT GLUCOSE MONITORING CONTINUOUS - Abnormal; Notable for the following components:    POC Glucose 342 (*)     All other components within normal limits   POCT GLUCOSE - Abnormal; Notable for the following components:    POCT Glucose 374 (*)     All other components within normal limits   POCT GLUCOSE - Abnormal; Notable for the following components:    POCT Glucose 333 (*)     All other components within normal limits   POCT GLUCOSE - Abnormal; Notable for the following components:    POCT  Glucose 293 (*)     All other components within normal limits   POCT GLUCOSE - Abnormal; Notable for the following components:    POCT Glucose 216 (*)     All other components within normal limits   POCT GLUCOSE - Abnormal; Notable for the following components:    POCT Glucose 167 (*)     All other components within normal limits   POCT GLUCOSE - Abnormal; Notable for the following components:    POCT Glucose 187 (*)     All other components within normal limits   POCT GLUCOSE - Abnormal; Notable for the following components:    POCT Glucose 152 (*)     All other components within normal limits   POCT GLUCOSE - Abnormal; Notable for the following components:    POCT Glucose 185 (*)     All other components within normal limits   POCT GLUCOSE - Abnormal; Notable for the following components:    POCT Glucose 238 (*)     All other components within normal limits   POCT GLUCOSE - Abnormal; Notable for the following components:    POCT Glucose 266 (*)     All other components within normal limits   LACTIC ACID, PLASMA   MAGNESIUM   POCT GLUCOSE MONITORING CONTINUOUS     EKG Readings: (Independently Interpreted)   Initial Reading: No STEMI. Previous EKG: Compared with most recent EKG Rhythm: Sinus Tachycardia.   Sinus tachycardia, rate 103, no ST changes or ischemia, normal intervals.  Compared to prior EKG July 2019 nonspecific ST changes have improved, no other significant change.     ECG Results          EKG 12-lead (Final result)  Result time 03/10/20 12:44:04    Final result by Interface, Lab In [facility] (03/10/20 12:44:04)                 Narrative:    Test Reason : E86.0,    Vent. Rate : 103 BPM     Atrial Rate : 107 BPM     P-R Int : 120 ms          QRS Dur : 092 ms      QT Int : 370 ms       P-R-T Axes : 073 078 039 degrees     QTc Int : 485 ms    Sinus tachycardia  Otherwise normal ECG  When compared with ECG of 12-JUL-2019 10:07,  Premature ventricular complexes are no longer Present  Confirmed by JAYSHREE MCKINNON,  BRITTON ([id number]) on 3/10/2020 12:43:50 PM    Referred By: ALYSE   SELF           Confirmed By:BRITTON MARTELL MD                            Imaging Results          X-Ray Foot Complete Left (Final result)  Result time 03/10/20 11:22:03    Final result by Vinnie Cortés III, MD (03/10/20 11:22:03)                 Narrative:    EXAMINATION:  XR FOOT COMPLETE 3 VIEW LEFT    CLINICAL HISTORY:  Non-pressure chronic ulcer of other part of left foot with unspecified severity    FINDINGS:  No fracture dislocation seen.  There is amputation of the 5th digit and partial amputation of the distal 5th metatarsal.  There is soft tissue swelling.  Osteomyelitis of the distal aspect of the remaining 5th metatarsal could not be excluded.  MRI could be helpful.      Electronically signed by: Vinnie Cortés MD  Date:    03/10/2020  Time:    11:22                               Medical Decision Making:   History:   Old Medical Records: I decided to obtain old medical records.  Old Records Summarized: other records.       <> Summary of Records: 3/5/20:  Evaluated by Podiatry due to high risk feet.  Found to have a new left foot wound for the last several days.  Measured 4 x 3 cm with exposed bone.  Debrided in office.  Wound cultures sent, positive for Staph aureus and skin floor, resistant to penicillin.  Started on doxy.  ESR/CRP elevated.  Initial Assessment:   48 yo M with DM presents to ED due to foot wound and N/V.  Tachycardic on arrival.  Will obtain labs, UA, serum ketones, give IV ABX, and continue to monitor.  Differential Diagnosis:   Differential Diagnosis includes, but is not limited to:  DKA, hyperglycemic hyperosmotic non-ketotic state, sepsis/infection, dehydration, UTI, dietary cause/increased sugar intake, device malfunction, alcohol/drug abuse, medication non-compliance.    Clinical Tests:   Lab Tests: Reviewed and Ordered  Radiological Study: Ordered and Reviewed  Medical Tests: Ordered and Reviewed  ED  Management:  Glucose 300s, AG 18, bicarb 22, serum ketones +, consistent with mild DKA. Insulin drip initiated.  X-ray without evidence of bony destruction, though severe plantar ulcer worsening despite PO ABX as OP raises concern for underlying osteomyelitis. ABX initiated in ED.  Discussed with HM for admission for further management.    On re-evaluation, the patient's status has remained critical.  At this time, I believe the patient should be admitted to the hospital for further evaluation and management of DKA, osteomyelitis.   service was contacted and the case was discussed.   The consulting physician/team agrees with plan and will admit under their service.   The patient and family were updated with test results, overall impression, and further plan of care. All questions were answered. The patient expressed understanding and agrees with the current plan.                Attending Attestation:         Attending Critical Care:   Critical Care Times:   Direct Patient Care (initial evaluation, reassessments, and time considering the case)................................................................15 minutes.   Additional History from reviewing old medical records or taking additional history from the family, EMS, PCP, etc.......................5 minutes.   Ordering, Reviewing, and Interpreting Diagnostic Studies...............................................................................................................10 minutes.   Documentation..................................................................................................................................................................................5 minutes.   Consultation with other Physicians. .................................................................................................................................................5 minutes.   Consultation with the patient's family directly relating to the patient's  condition, care, and DNR status (when patient unable)......5 minutes.   ==============================================================  · Total Critical Care Time - exclusive of procedural time: 45 minutes.  ==============================================================  Critical care was necessary to treat or prevent imminent or life-threatening deterioration of the following conditions: endocrine crisis and dehydration.   Critical care was time spent personally by me on the following activities: obtaining history from patient or relative, examination of patient, review of old charts, development of treatment plan with patient or relative, evaluation of patient's response to treatment, discussion with consultants and re-evaluation of patient's conition.   Critical Care Condition: critical                         Medications   sodium chloride 0.9% flush 10 mL (has no administration in time range)   insulin regular 100 Units in sodium chloride 0.9% 100 mL infusion (1.6 Units/hr Intravenous Handoff 3/12/20 1924)   dextrose 10% (D10W) Bolus (has no administration in time range)   dextrose 10% (D10W) Bolus (has no administration in time range)   glucose chewable tablet 16 g (has no administration in time range)   glucose chewable tablet 24 g (has no administration in time range)   glucagon (human recombinant) injection 1 mg (has no administration in time range)   potassium chloride 10% oral solution 40 mEq (has no administration in time range)   ondansetron disintegrating tablet 8 mg ( Oral Canceled Entry 3/12/20 1900)   promethazine (PHENERGAN) 6.25 mg in dextrose 5 % 50 mL IVPB (6.25 mg Intravenous New Bag 3/12/20 1842)   atorvastatin tablet 80 mg (80 mg Oral Not Given 3/12/20 0900)   gabapentin capsule 600 mg (600 mg Oral Not Given 3/12/20 0900)   hydrALAZINE tablet 25 mg (has no administration in time range)   hydroxyzine HCL tablet 50 mg (50 mg Oral Not Given 3/11/20 2635)   ceFAZolin (ANCEF) 6 g in dextrose 5 %  500 mL CONTINUOUS INFUSION (6 g Intravenous Given 3/12/20 1520)   ondansetron injection 4 mg (4 mg Intravenous Given 3/12/20 2156)   dextrose 5 % and 0.45 % NaCl with KCl 20 mEq infusion ( Intravenous New Bag 3/12/20 2354)   losartan split tablet 12.5 mg (12.5 mg Oral Given 3/12/20 1122)   acetaminophen tablet 650 mg (650 mg Oral Given 3/12/20 1952)   acetaminophen tablet 650 mg (0 mg Oral Hold 3/12/20 1715)   heparin (porcine) injection 5,000 Units (5,000 Units Subcutaneous Given 3/12/20 2055)   sodium chloride 0.9% bolus 1,000 mL (0 mLs Intravenous Stopped 3/10/20 1156)   ondansetron injection 4 mg (4 mg Intravenous Given 3/10/20 1134)   acetaminophen tablet 650 mg (650 mg Oral Given 3/10/20 1151)   0.9 % NaCl with KCl 20 mEq infusion (0 mLs Intravenous Stopped 3/10/20 1847)   vancomycin 1.5 g in dextrose 5 % 250 mL IVPB (ready to mix) (0 mg/kg × 99.8 kg Intravenous Stopped 3/10/20 1440)   piperacillin-tazobactam 4.5 g in sodium chloride 0.9% 100 mL IVPB (ready to mix system) (0 g Intravenous Stopped 3/10/20 1246)   sodium phosphate 30 mmol in dextrose 5 % 250 mL IVPB (30 mmol Intravenous New Bag 3/10/20 2015)   prochlorperazine injection Soln 2.5 mg (2.5 mg Intravenous Given 3/11/20 0701)   sodium phosphate 30 mmol in dextrose 5 % 250 mL IVPB (30 mmol Intravenous New Bag 3/11/20 0900)   gadobutroL (GADAVIST) injection 10 mL (10 mLs Intravenous Given 3/11/20 1320)   potassium chloride 10 mEq in 100 mL IVPB (10 mEq Intravenous New Bag 3/12/20 0531)   magnesium sulfate 2 g/50 ml IVPB (2 g Intravenous Given 3/12/20 1124)   acetaminophen tablet 650 mg (650 mg Oral Given 3/12/20 1409)         Clinical Impression:       ICD-10-CM ICD-9-CM   1. Diabetic ketoacidosis without coma associated with type 2 diabetes mellitus E11.10 250.12   2. Dehydration E86.0 276.51   3. Chronic ulcer of left foot L97.529 707.15   4. Diabetic ulcer of left midfoot associated with type 2 diabetes mellitus, with bone involvement without  evidence of necrosis E11.621 250.80    L97.426 707.14   5. Bacteremia R78.81 790.7   6. Osteomyelitis of left foot, unspecified type M86.9 730.27             ED Disposition Condition    Admit                           Isaac Rudolph MD  03/13/20 0321

## 2020-03-10 NOTE — ED NOTES
Patient identifiers have been checked and are correct.  Patient assisted to ED stretcher and placed in a hospital gown.     LOC: The patient is awake, alert, and aware of environment. The patient is oriented x 3 and speaking appropriately.   APPEARANCE: No acute distress noted.   PSYCHOSOCIAL: Patient is calm and cooperative.   SKIN: The skin is warm, dry. Silver dollar size wound noted to left ball of foot- dressing noted to site with dried blood removed- granulation tissue noted around edges.   RESPIRATORY: Airway is open and patent. Bilateral chest rise and fall. Respirations are spontaneous, even and unlabored. Normal effort and rate noted. No accessory muscle use noted.   CARDIAC: ST noted on cardiac monitor.   ABDOMEN: Soft and non tender to palpation. No distention noted. + nausea.   NEUROLOGIC: Eyes open spontaneously. Speech clear. Tolerating saliva secretions well. Able to follow commands, demonstrating ability to actively and appropriately communicate within context of current conversation. Symmetrical facial muscles. Moving all extremities. Movement is purposeful.   MUSCULOSKELETAL: No obvious deformities noted.     Side rails up x2. Call light within reach. Will continue to monitor.

## 2020-03-10 NOTE — SUBJECTIVE & OBJECTIVE
Past Medical History:   Diagnosis Date    Actinomyces infection 1/17/2017    Right foot    Diabetic ketoacidosis without coma associated with type 2 diabetes mellitus 5/30/2017    Diabetic ulcer of right foot associated with type 2 diabetes mellitus 6/3/2015    Essential hypertension 6/6/2013    Group B streptococcal infection 1/13/2017    Mixed hyperlipidemia 8/12/2014    Shoulder impingement 8/12/2014    Subacute osteomyelitis of right foot 1/12/2017    Streptococcus agalactiae, Actinomyces odontolyticus    Traumatic amputation of fifth toe of right foot 07/02/2015    Type 2 diabetes mellitus with diabetic neuropathy, with long-term current use of insulin 5/3/2016    Ulcerative colitis, unspecified        Past Surgical History:   Procedure Laterality Date    DEBRIDEMENT OF FOOT Left 7/14/2019    Procedure: DEBRIDEMENT, FOOT, with left 5th ray amputation;  Surgeon: Sis Hickman DPM;  Location: [facility] OR [address] [place] [address];  Service: Podiatry;  Laterality: Left;    DEBRIDEMENT OF FOOT Left 7/17/2019    Procedure: DEBRIDEMENT, FOOT with leftt 5th ray partial amputation with Neox Graft;  Surgeon: Mai Burrell DPM;  Location: [facility] OR [address] [place] [address];  Service: Podiatry;  Laterality: Left;  t    TOE AMPUTATION Right 06/05/2015    TOE AMPUTATION Right 01/19/2017       Review of patient's allergies indicates:  No Known Allergies    No current facility-administered medications on file prior to encounter.      Current Outpatient Medications on File Prior to Encounter   Medication Sig    atorvastatin (LIPITOR) 80 MG tablet Take 1 tablet (80 mg total) by mouth once daily.    doxycycline (MONODOX) 100 MG capsule Take 1 capsule (100 mg total) by mouth every 12 (twelve) hours.    gabapentin (NEURONTIN) 600 MG tablet Take 2 tablets (1,200 mg total) by mouth 2 (two) times daily.    insulin aspart U-100 (NOVOLOG) 100 unit/mL (3 mL) InPn pen Inject 10 units w/ meals plus scale 150-200+2, 201-250+4, 251-300+6, 301-350+8,  ">350+10. Snack dose 5 units.  Light meal cut back to 5 units. Max daily 60 units.    insulin detemir U-100 (LEVEMIR FLEXTOUCH) 100 unit/mL (3 mL) SubQ InPn pen Inject 25 Units into the skin every evening.    liraglutide 0.6 mg/0.1 mL, 18 mg/3 mL, subq PNIJ (VICTOZA 2-JOYCE) 0.6 mg/0.1 mL (18 mg/3 mL) PnIj Inject 0.6 mg into the skin daily week 1 and 1.2 mg daily week 2 and on.    losartan (COZAAR) 25 MG tablet Take 0.5 tablets (12.5 mg total) by mouth once daily. Holds if SBP is less than 120.    metFORMIN (GLUCOPHAGE) 500 MG tablet Take 2 tablets by mouth in the morning, and take 2 tablets by mouth at night.    ONETOUCH DELICA LANCETS 33 gauge Misc     ONETOUCH ULTRA2 kit     pen needle, diabetic 31 gauge x 5/16" Ndle Use to inject insulin four times daily     Family History     Problem Relation (Age of Onset)    Cancer Mother    Cataracts Father    Diabetes Mother, Sister    Hypertension Mother, Father, Maternal Aunt    No Known Problems Brother, Sister, Sister, Maternal Uncle, Paternal Aunt, Paternal Uncle, Maternal Grandmother, Maternal Grandfather, Paternal Grandmother, Paternal Grandfather        Tobacco Use    Smoking status: Never Smoker    Smokeless tobacco: Never Used   Substance and Sexual Activity    Alcohol use: No    Drug use: No    Sexual activity: Yes     Partners: Female     Birth control/protection: Condom     Review of Systems   Constitutional: Positive for appetite change and chills. Negative for activity change, fatigue and fever.   HENT: Negative for congestion, facial swelling, sore throat and trouble swallowing.    Respiratory: Negative for apnea, cough, chest tightness, shortness of breath and wheezing.    Cardiovascular: Negative for chest pain, palpitations and leg swelling.   Gastrointestinal: Positive for nausea and vomiting. Negative for abdominal distention, abdominal pain, blood in stool and diarrhea.   Genitourinary: Negative for difficulty urinating, dysuria, flank pain " and hematuria.   Musculoskeletal: Negative for arthralgias, back pain, myalgias and neck pain.   Skin: Negative for color change and rash.   Neurological: Negative for dizziness, weakness, light-headedness, numbness and headaches.   Hematological: Negative for adenopathy.   Psychiatric/Behavioral: Negative for agitation and behavioral problems.     Objective:     Vital Signs (Most Recent):  Temp: 97.8 °F (36.6 °C) (03/10/20 1156)  Pulse: 86 (03/10/20 1556)  Resp: 20 (03/10/20 1556)  BP: 138/83 (03/10/20 1556)  SpO2: 98 % (03/10/20 1556) Vital Signs (24h Range):  Temp:  [97.8 °F (36.6 °C)] 97.8 °F (36.6 °C)  Pulse:  [] 86  Resp:  [16-20] 20  SpO2:  [98 %-99 %] 98 %  BP: (123-179)/() 138/83     Weight: 99.8 kg (220 lb)  Body mass index is 29.03 kg/m².    Physical Exam   Constitutional: He is oriented to person, place, and time. He appears well-developed and well-nourished. No distress.   Looks ill    HENT:   Head: Normocephalic and atraumatic.   Eyes: Pupils are equal, round, and reactive to light. Conjunctivae and EOM are normal.   Neck: Normal range of motion. Neck supple.   Cardiovascular: Normal rate, regular rhythm and normal heart sounds.   No murmur heard.  Pulmonary/Chest: Effort normal and breath sounds normal. No respiratory distress. He has no wheezes. He has no rales.   Abdominal: Soft. Bowel sounds are normal. He exhibits no distension. There is no tenderness.   Musculoskeletal: He exhibits no edema or deformity.   Lymphadenopathy:     He has no cervical adenopathy.   Neurological: He is alert and oriented to person, place, and time. He displays normal reflexes. No cranial nerve deficit or sensory deficit. He exhibits normal muscle tone.   Skin: Skin is warm. Capillary refill takes less than 2 seconds. He is not diaphoretic.   Psychiatric: He has a normal mood and affect. His behavior is normal.         CRANIAL NERVES     CN III, IV, VI   Pupils are equal, round, and reactive to  light.  Extraocular motions are normal.            Significant Labs:   A1C:   Recent Labs   Lab 12/03/19  1019   HGBA1C 8.4*     Blood Culture: No results for input(s): LABBLOO in the last 48 hours.  CBC:   Recent Labs   Lab 03/10/20  1057   WBC 6.95   HGB 12.7*   HCT 40.8        CMP:   Recent Labs   Lab 03/10/20  1057 03/10/20  1529    140   K 3.7 3.7   CL 98 104   CO2 22* 24   * 219*   BUN 18 15   CREATININE 1.4 1.1   CALCIUM 9.6 8.8   PROT 9.1* 7.8   ALBUMIN 2.7* 2.3*   BILITOT 0.4 0.3   ALKPHOS 95 79   AST 15 15   ALT 9* 7*   ANIONGAP 18* 12   EGFRNONAA 57.8* >60.0       Significant Imaging: I have reviewed all pertinent imaging results/findings within the past 24 hours.

## 2020-03-10 NOTE — ED NOTES
Pt remains AAox3. No distress noted. Respirations even and unlabored. Denies any current complaints. Reports nausea gone. Updated on POC, verbalizes understanding. Side rails up x2. Family at bedside. Will continue to monitor.

## 2020-03-10 NOTE — HPI
Mr. Ryder is 50 y/o with a history of poorly controlled DM2 ( last Ha1c 8.4 ) , DKA with coma, diabetic neuropathy, diabatic retinopathy and diabetic foot with multiple amputation who presents to ED with chief complaint of chills associated with nausea and vomiting for one day. He sates that yesterday he started to feel ill and he has been having chills associated with nauseas and vomiting. He also endorses poor appetite with decreased oral intake for the last week. Denies fever, abdominal pain, feet or leg pain, leg swelling, CP, SOB, dysuria. He a history of poorly control diabetes with amputation of his right big and 5th toes. He undergone left big toe detriment and imputation on 7/14/2019. He reports not feeling well for the last couple of weeks and he felt about to pass out on Mardi gras and his BP was in 90/50. He was seen by podiatry last  Thursday when he had his wound opened and he was prescribed doxycycline for 10 days.     In ED, he was afebrile, vitally stable. Lab, BS: 382, B-hydroxybutyrate 3.6, HCO3 22,  anion gap 18. WBC: 6.9, lactate: 1.2  X-ray of right food shows soft tissue edema concerning for osteomyelitis. He received single dose of vanc/zosyn and 2L IVF.

## 2020-03-10 NOTE — ASSESSMENT & PLAN NOTE
-- Pt has a hx of poorly controled DM   -- hx of diabetic ulcer with multiple amputations   -- currently treated with doxycyline   -- wound cx positive for S. aureus      Plan :   -- started on rocephin and flagyl   -- consult podiatry, applicate their help

## 2020-03-10 NOTE — ED NOTES
. No change per insulin rate change sheet. Pt does admit to drinking some lemonade off meal tray earlier.

## 2020-03-10 NOTE — H&P
[facility] Hospital Medicine  History & Physical    Patient Name: Indio Ryder Jr.  MRN: [id number]  Admission Date: 3/10/2020  Attending Physician: Antonio Valladares MD   Primary Care Provider: Lorena Thakur MD    [facility] Medicine Team: [facility] HOSP MED 5 Vicky Singh MD     Patient information was obtained from patient, relative(s), past medical records and ER records.     Subjective:     Principal Problem:Diabetic ketoacidosis without coma associated with type 2 diabetes mellitus    Chief Complaint:   Chief Complaint   Patient presents with    Wound Infection     on antibiotics, vomiting, diabetic        HPI: Mr. Ryder is 48 y/o with a history of poorly controlled DM2 ( last Ha1c 8.4 ) , DKA with coma, diabetic neuropathy, diabatic retinopathy and diabetic foot with multiple amputation who presents to ED with chief complaint of chills associated with nausea and vomiting for one day. He sates that yesterday he started to feel ill and he has been having chills associated with nauseas and vomiting. He also endorses poor appetite with decreased oral intake for the last week. Denies fever, abdominal pain, feet or leg pain, leg swelling, CP, SOB, dysuria. He a history of poorly control diabetes with amputation of his right big and 5th toes. He undergone left big toe detriment and imputation on 7/14/2019. He reports not feeling well for the last couple of weeks and he felt about to pass out on Mardi gras and his BP was in 90/50. He was seen by podiatry last  Thursday when he had his wound opened and he was prescribed doxycycline for 10 days.     In ED, he was afebrile, vitally stable. Lab, BS: 382, B-hydroxybutyrate 3.6, HCO3 22,  anion gap 18. WBC: 6.9, lactate: 1.2  X-ray of right food shows soft tissue edema concerning for osteomyelitis. He received single dose of vanc/zosyn and 2L IVF.     Past Medical History:   Diagnosis Date    Actinomyces infection 1/17/2017    Right foot     Diabetic ketoacidosis without coma associated with type 2 diabetes mellitus 5/30/2017    Diabetic ulcer of right foot associated with type 2 diabetes mellitus 6/3/2015    Essential hypertension 6/6/2013    Group B streptococcal infection 1/13/2017    Mixed hyperlipidemia 8/12/2014    Shoulder impingement 8/12/2014    Subacute osteomyelitis of right foot 1/12/2017    Streptococcus agalactiae, Actinomyces odontolyticus    Traumatic amputation of fifth toe of right foot 07/02/2015    Type 2 diabetes mellitus with diabetic neuropathy, with long-term current use of insulin 5/3/2016    Ulcerative colitis, unspecified        Past Surgical History:   Procedure Laterality Date    DEBRIDEMENT OF FOOT Left 7/14/2019    Procedure: DEBRIDEMENT, FOOT, with left 5th ray amputation;  Surgeon: Sis Hickman DPM;  Location: [facility] OR [address] [place] [address];  Service: Podiatry;  Laterality: Left;    DEBRIDEMENT OF FOOT Left 7/17/2019    Procedure: DEBRIDEMENT, FOOT with leftt 5th ray partial amputation with Neox Graft;  Surgeon: Mai Burrell DPM;  Location: [facility] OR [address] [place] [address];  Service: Podiatry;  Laterality: Left;  t    TOE AMPUTATION Right 06/05/2015    TOE AMPUTATION Right 01/19/2017       Review of patient's allergies indicates:  No Known Allergies    No current facility-administered medications on file prior to encounter.      Current Outpatient Medications on File Prior to Encounter   Medication Sig    atorvastatin (LIPITOR) 80 MG tablet Take 1 tablet (80 mg total) by mouth once daily.    doxycycline (MONODOX) 100 MG capsule Take 1 capsule (100 mg total) by mouth every 12 (twelve) hours.    gabapentin (NEURONTIN) 600 MG tablet Take 2 tablets (1,200 mg total) by mouth 2 (two) times daily.    insulin aspart U-100 (NOVOLOG) 100 unit/mL (3 mL) InPn pen Inject 10 units w/ meals plus scale 150-200+2, 201-250+4, 251-300+6, 301-350+8, >350+10. Snack dose 5 units.  Light meal cut back to 5 units. Max daily 60 units.     "insulin detemir U-100 (LEVEMIR FLEXTOUCH) 100 unit/mL (3 mL) SubQ InPn pen Inject 25 Units into the skin every evening.    liraglutide 0.6 mg/0.1 mL, 18 mg/3 mL, subq PNIJ (VICTOZA 2-JOYCE) 0.6 mg/0.1 mL (18 mg/3 mL) PnIj Inject 0.6 mg into the skin daily week 1 and 1.2 mg daily week 2 and on.    losartan (COZAAR) 25 MG tablet Take 0.5 tablets (12.5 mg total) by mouth once daily. Holds if SBP is less than 120.    metFORMIN (GLUCOPHAGE) 500 MG tablet Take 2 tablets by mouth in the morning, and take 2 tablets by mouth at night.    ONETOUCH DELICA LANCETS 33 gauge Misc     ONETOUCH ULTRA2 kit     pen needle, diabetic 31 gauge x 5/16" Ndle Use to inject insulin four times daily     Family History     Problem Relation (Age of Onset)    Cancer Mother    Cataracts Father    Diabetes Mother, Sister    Hypertension Mother, Father, Maternal Aunt    No Known Problems Brother, Sister, Sister, Maternal Uncle, Paternal Aunt, Paternal Uncle, Maternal Grandmother, Maternal Grandfather, Paternal Grandmother, Paternal Grandfather        Tobacco Use    Smoking status: Never Smoker    Smokeless tobacco: Never Used   Substance and Sexual Activity    Alcohol use: No    Drug use: No    Sexual activity: Yes     Partners: Female     Birth control/protection: Condom     Review of Systems   Constitutional: Positive for appetite change and chills. Negative for activity change, fatigue and fever.   HENT: Negative for congestion, facial swelling, sore throat and trouble swallowing.    Respiratory: Negative for apnea, cough, chest tightness, shortness of breath and wheezing.    Cardiovascular: Negative for chest pain, palpitations and leg swelling.   Gastrointestinal: Positive for nausea and vomiting. Negative for abdominal distention, abdominal pain, blood in stool and diarrhea.   Genitourinary: Negative for difficulty urinating, dysuria, flank pain and hematuria.   Musculoskeletal: Negative for arthralgias, back pain, myalgias and neck " pain.   Skin: Negative for color change and rash.   Neurological: Negative for dizziness, weakness, light-headedness, numbness and headaches.   Hematological: Negative for adenopathy.   Psychiatric/Behavioral: Negative for agitation and behavioral problems.     Objective:     Vital Signs (Most Recent):  Temp: 97.8 °F (36.6 °C) (03/10/20 1156)  Pulse: 86 (03/10/20 1556)  Resp: 20 (03/10/20 1556)  BP: 138/83 (03/10/20 1556)  SpO2: 98 % (03/10/20 1556) Vital Signs (24h Range):  Temp:  [97.8 °F (36.6 °C)] 97.8 °F (36.6 °C)  Pulse:  [] 86  Resp:  [16-20] 20  SpO2:  [98 %-99 %] 98 %  BP: (123-179)/() 138/83     Weight: 99.8 kg (220 lb)  Body mass index is 29.03 kg/m².    Physical Exam   Constitutional: He is oriented to person, place, and time. He appears well-developed and well-nourished. No distress.   Looks ill    HENT:   Head: Normocephalic and atraumatic.   Eyes: Pupils are equal, round, and reactive to light. Conjunctivae and EOM are normal.   Neck: Normal range of motion. Neck supple.   Cardiovascular: Normal rate, regular rhythm and normal heart sounds.   No murmur heard.  Pulmonary/Chest: Effort normal and breath sounds normal. No respiratory distress. He has no wheezes. He has no rales.   Abdominal: Soft. Bowel sounds are normal. He exhibits no distension. There is no tenderness.   Musculoskeletal: He exhibits no edema or deformity.   Lymphadenopathy:     He has no cervical adenopathy.   Neurological: He is alert and oriented to person, place, and time. He displays normal reflexes. No cranial nerve deficit or sensory deficit. He exhibits normal muscle tone.   Skin: Skin is warm. Capillary refill takes less than 2 seconds. He is not diaphoretic.   Psychiatric: He has a normal mood and affect. His behavior is normal.         CRANIAL NERVES     CN III, IV, VI   Pupils are equal, round, and reactive to light.  Extraocular motions are normal.            Significant Labs:   A1C:   Recent Labs   Lab  12/03/19  1019   HGBA1C 8.4*     Blood Culture: No results for input(s): LABBLOO in the last 48 hours.  CBC:   Recent Labs   Lab 03/10/20  1057   WBC 6.95   HGB 12.7*   HCT 40.8        CMP:   Recent Labs   Lab 03/10/20  1057 03/10/20  1529    140   K 3.7 3.7   CL 98 104   CO2 22* 24   * 219*   BUN 18 15   CREATININE 1.4 1.1   CALCIUM 9.6 8.8   PROT 9.1* 7.8   ALBUMIN 2.7* 2.3*   BILITOT 0.4 0.3   ALKPHOS 95 79   AST 15 15   ALT 9* 7*   ANIONGAP 18* 12   EGFRNONAA 57.8* >60.0       Significant Imaging: I have reviewed all pertinent imaging results/findings within the past 24 hours.    Assessment/Plan:     * Diabetic ketoacidosis without coma associated with type 2 diabetes mellitus  50 y/o with a history of poorly controlled DM2 ,diabetic neuropathy, diabatic retinopathy and diabetic foot with multiple amputation who presents to ED with chief complaint of chills associated with nausea and vomiting for one day.   He has a history of foot ulcer of left big toe who currently treated with doxycycline.    -- afebrile, vitally stable.  -- Lab, BS: 382, B-hydroxybutyrate 3.6, HCO3 22,  anion gap 18.  -- WBC: 6.9, lactate: 1.2  -- X-ray of right food shows soft tissue edema concerning for osteomyelitis  -- last Ha1c 8.4  -- wound cx 3/5 positive for staph aureus     Plan :  -- started on insuline gtt   -- started on IVF infusion 100cc/h  -- CMP q4, Phos q4   -- replete electrolyte   -- blood cx drawn, follow up   -- started on ceftriaxone and metronidazole        Diabetic ulcer of left midfoot associated with type 2 diabetes mellitus, with bone involvement without evidence of necrosis  -- Pt has a hx of poorly controled DM   -- hx of diabetic ulcer with multiple amputations   -- currently treated with doxycyline   -- wound cx positive for S. 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

## 2020-03-10 NOTE — ED TRIAGE NOTES
Pt reports nausea vomiting and subjective fevers since Sunday. Reports was seen Thursday for left foot wound.

## 2020-03-10 NOTE — ED NOTES
Pt remains AAox3, resting in bed. No acute distress noted. Respirations even and unlabored. No new complaints voiced. Appears comfortable in bed. Updated on POC, verbalizes understanding. Side rails up x2. Call light within reach. Family at bedside. Will continue to monitor.

## 2020-03-10 NOTE — MEDICAL/APP STUDENT
History & Physical  [facility] HOSP MED 5    SUBJECTIVE:   Chief Complaint/Reason for Admission: Diabetic Ketoacidosis due to medication noncompliance    History of Present Illness:  Patient is a 49 y.o. male w/ PMHx of T2DM, DKA without coma, Diabetic ulcer of R foot, Diabetic neuropathy, HTN, Group B Strep infection, Subacute osteomyelitis of R foot, and amputation of 5th toe of R foot who Px to ED with nausea and vomiting. Pt states that he had been vomiting all night and has had a decreased appetite for the past 4-5 days. He has not been able to eat (keep down food) for the past 2 days. He has vomited 1x since being admitted in the ED. He states that he is a regular insulin user, but has not been compliant with his insulin regimen for the past week. He states that he has been compliant with Metformin. He states that he is fatigued and has a bad headache. He denies any diarrhea, abdominal pain, chest pains, palpitations, or dizziness.   Mr. Ryder also has a diabetic ulcer present on the L foot. He has a history of diabetic neuropathy, so states that he feels no pain nor dresses the wound. He says that the infection started 2 weeks ago after [place]. He was seen by Podiatry on 3/5/20 and started on a course of Doxycycline 100 mg 2x/daily.         Past Medical History:   Diagnosis Date    Actinomyces infection 1/17/2017    Right foot    Diabetic ketoacidosis without coma associated with type 2 diabetes mellitus 5/30/2017    Diabetic ulcer of right foot associated with type 2 diabetes mellitus 6/3/2015    Essential hypertension 6/6/2013    Group B streptococcal infection 1/13/2017    Mixed hyperlipidemia 8/12/2014    Shoulder impingement 8/12/2014    Subacute osteomyelitis of right foot 1/12/2017    Streptococcus agalactiae, Actinomyces odontolyticus    Traumatic amputation of fifth toe of right foot 07/02/2015    Type 2 diabetes mellitus with diabetic neuropathy, with long-term current use of insulin  5/3/2016    Ulcerative colitis, unspecified        Past Surgical History:   Procedure Laterality Date    DEBRIDEMENT OF FOOT Left 7/14/2019    Procedure: DEBRIDEMENT, FOOT, with left 5th ray amputation;  Surgeon: Sis Hickman DPM;  Location: [facility] OR [address] [place] [address];  Service: Podiatry;  Laterality: Left;    DEBRIDEMENT OF FOOT Left 7/17/2019    Procedure: DEBRIDEMENT, FOOT with leftt 5th ray partial amputation with Neox Graft;  Surgeon: Mai Burrell DPM;  Location: [facility] OR [address] [place] [address];  Service: Podiatry;  Laterality: Left;  t    TOE AMPUTATION Right 06/05/2015    TOE AMPUTATION Right 01/19/2017      Family History   Adopted: Yes   Problem Relation Age of Onset    Hypertension Mother     Cancer Mother         breast    Diabetes Mother     Hypertension Father     Cataracts Father     Diabetes Sister     No Known Problems Brother     No Known Problems Sister     No Known Problems Sister     Hypertension Maternal Aunt     No Known Problems Maternal Uncle     No Known Problems Paternal Aunt     No Known Problems Paternal Uncle     No Known Problems Maternal Grandmother     No Known Problems Maternal Grandfather     No Known Problems Paternal Grandmother     No Known Problems Paternal Grandfather     Amblyopia Neg Hx     Blindness Neg Hx     Glaucoma Neg Hx     Macular degeneration Neg Hx     Retinal detachment Neg Hx     Strabismus Neg Hx     Stroke Neg Hx     Thyroid disease Neg Hx        Social History     Tobacco Use    Smoking status: Never Smoker    Smokeless tobacco: Never Used   Substance Use Topics    Alcohol use: No    Drug use: No      Review of patient's allergies indicates:  No Known Allergies    No current facility-administered medications on file prior to encounter.      Current Outpatient Medications on File Prior to Encounter   Medication Sig Dispense Refill    atorvastatin (LIPITOR) 80 MG tablet Take 1 tablet (80 mg total) by mouth once daily. 90 tablet 3     "doxycycline (MONODOX) 100 MG capsule Take 1 capsule (100 mg total) by mouth every 12 (twelve) hours. 20 capsule 10    gabapentin (NEURONTIN) 600 MG tablet Take 2 tablets (1,200 mg total) by mouth 2 (two) times daily. 120 tablet 3    insulin aspart U-100 (NOVOLOG) 100 unit/mL (3 mL) InPn pen Inject 10 units w/ meals plus scale 150-200+2, 201-250+4, 251-300+6, 301-350+8, >350+10. Snack dose 5 units.  Light meal cut back to 5 units. Max daily 60 units. 2 Box 6    insulin detemir U-100 (LEVEMIR FLEXTOUCH) 100 unit/mL (3 mL) SubQ InPn pen Inject 25 Units into the skin every evening. 30 mL 0    liraglutide 0.6 mg/0.1 mL, 18 mg/3 mL, subq PNIJ (VICTOZA 2-JOYCE) 0.6 mg/0.1 mL (18 mg/3 mL) PnIj Inject 0.6 mg into the skin daily week 1 and 1.2 mg daily week 2 and on. 6 mL 6    losartan (COZAAR) 25 MG tablet Take 0.5 tablets (12.5 mg total) by mouth once daily. Holds if SBP is less than 120. 45 tablet 3    metFORMIN (GLUCOPHAGE) 500 MG tablet Take 2 tablets by mouth in the morning, and take 2 tablets by mouth at night. 360 tablet 3    ONETOUCH DELICA LANCETS 33 gauge Misc       ONETOUCH ULTRA2 kit       pen needle, diabetic 31 gauge x 5/16" Ndle Use to inject insulin four times daily 100 each 3        Review of Systems:  Constitutional: positive for fatigue, negative for chills, fevers and night sweats  ENT: negative for hoarseness and sore throat  Respiratory: positive for cough, negative for dyspnea on exertion, hemoptysis, sputum and wheezing  Cardiovascular: negative for chest pain, dyspnea and palpitations  Gastrointestinal: positive for nausea and vomiting, negative for abdominal pain, constipation, diarrhea and dysphagia  Musculoskeletal: negative for arthralgias, myalgias and stiff joints  Neurological: positive for headaches and weakness, negative for dizziness  Behavioral/Psych: no auditory or visual hallucinations  Endocrine: positive for diabetic symptoms including increased fatigue, poor wound healing and " skin dryness     OBJECTIVE:     Vital Signs (Most Recent)   Temp: 97.8 °F (36.6 °C) (03/10/20 1156)  Pulse: 92 (03/10/20 1227)  Resp: 16 (03/10/20 1227)  BP: (!) 179/99 (03/10/20 1227)  SpO2: 99 % (03/10/20 1227)  Body mass index is 29.03 kg/m².      Physical Exam:  General: fatigued, mild distress  Lungs:  clear to auscultation bilaterally and normal respiratory effort  Cardiovascular: Heart: regular rate and rhythm, S1, S2 normal, no murmur, click, rub or gallop. Chest Wall: no tenderness. Extremities: no cyanosis or edema, or clubbing and Diabetic Ulcer seen on L foot. 3 Digits present on R foot; no ulcer visible on exam. Pulses: 2+ and symmetric  not examined.  Abdomen/Rectal: Abdomen: soft, non-tender non-distented; bowel sounds normal; no masses,  no organomegaly. Rectal: normal tone, no masses or tenderness and not examined  Skin: Skin has decreased turgor and appears flaky and dry  Neurologic: Sensory: Pt has Neuropathy in Lower extremities. States has decreased sensation. Patient has paresthesias and numbness in both upper extremities.  Psych/Behavioral:  Alert and oriented, appropriate affect. and Speech: quiet, slow  ***    Laboratory:  CBC/Anemia Labs: Coags:    Recent Labs   Lab 03/05/20  1617 03/10/20  1057   WBC 8.29 6.95   HGB 12.8* 12.7*   HCT 42.3 40.8    327   MCV 96 93   RDW 12.5 12.3    No results for input(s): PT, INR, APTT in the last 168 hours.     Chemistries: ABG:   Recent Labs   Lab 03/05/20  1617 03/10/20  1057    138   K 3.8 3.7   CL 98 98   CO2 27 22*   BUN 9 18   CREATININE 1.1 1.4   CALCIUM 9.8 9.6   PROT 8.4 9.1*   BILITOT 0.7 0.4   ALKPHOS 107 95   ALT 9* 9*   AST 10 15    No results for input(s): PH, PCO2, PO2, HCO3, POCSATURATED, BE in the last 168 hours.       ASSESSMENT/PLAN:     Active Hospital Problems    Diagnosis  POA    Diabetic ketoacidosis without coma associated with type 2 diabetes mellitus [E11.10]  Yes      Resolved Hospital Problems   No resolved  problems to display.     Assessment  Mr. Ryder is a 50 yo M w/ PMHx of T2DM, Diabetic neuropathy, DKA, L Foot Ulcer, and amputation of the 5th digit of R foot who Px to ED with DKA due to Insulin noncompliance.     Plan    Diabetic Ketoacidosis  Administer 0.9% NaCl (1L/hr)  Monitor Glucose. POCT Glucose hourly   20 mEq of K   IV Insulin regular 100 units Continuous; Once patient BG <200, add 5% dextrose to avoid hypoglycemia  Order Serum Mg, Phosphate  Electrolyte panel q4 to trend K and Phos    Diabetic Ulcer  Consult Podiatry for care and assessment of wound  Pt currently on course of Doxycycline  Blood cultures and Biopsy of Ulcer  Dress Ulcer

## 2020-03-11 ENCOUNTER — PATIENT OUTREACH (OUTPATIENT)
Dept: ADMINISTRATIVE | Facility: OTHER | Age: 50
End: 2020-03-11

## 2020-03-11 PROBLEM — M86.9 OSTEOMYELITIS OF LEFT FOOT: Status: ACTIVE | Noted: 2020-03-11

## 2020-03-11 PROBLEM — R78.81 BACTEREMIA: Status: ACTIVE | Noted: 2020-03-11

## 2020-03-11 LAB
ALBUMIN SERPL BCP-MCNC: 2.1 G/DL (ref 3.5–5.2)
ALBUMIN SERPL BCP-MCNC: 2.2 G/DL (ref 3.5–5.2)
ALBUMIN SERPL BCP-MCNC: 2.3 G/DL (ref 3.5–5.2)
ALBUMIN SERPL BCP-MCNC: 2.3 G/DL (ref 3.5–5.2)
ALP SERPL-CCNC: 73 U/L (ref 55–135)
ALP SERPL-CCNC: 73 U/L (ref 55–135)
ALP SERPL-CCNC: 74 U/L (ref 55–135)
ALP SERPL-CCNC: 75 U/L (ref 55–135)
ALP SERPL-CCNC: 77 U/L (ref 55–135)
ALP SERPL-CCNC: 78 U/L (ref 55–135)
ALT SERPL W/O P-5'-P-CCNC: 7 U/L (ref 10–44)
ALT SERPL W/O P-5'-P-CCNC: 8 U/L (ref 10–44)
ALT SERPL W/O P-5'-P-CCNC: 8 U/L (ref 10–44)
ANION GAP SERPL CALC-SCNC: 10 MMOL/L (ref 8–16)
ANION GAP SERPL CALC-SCNC: 10 MMOL/L (ref 8–16)
ANION GAP SERPL CALC-SCNC: 11 MMOL/L (ref 8–16)
ANION GAP SERPL CALC-SCNC: 12 MMOL/L (ref 8–16)
ANION GAP SERPL CALC-SCNC: 13 MMOL/L (ref 8–16)
ANION GAP SERPL CALC-SCNC: 9 MMOL/L (ref 8–16)
AST SERPL-CCNC: 14 U/L (ref 10–40)
AST SERPL-CCNC: 14 U/L (ref 10–40)
AST SERPL-CCNC: 15 U/L (ref 10–40)
AST SERPL-CCNC: 15 U/L (ref 10–40)
AST SERPL-CCNC: 16 U/L (ref 10–40)
AST SERPL-CCNC: 18 U/L (ref 10–40)
BASOPHILS # BLD AUTO: 0.03 K/UL (ref 0–0.2)
BASOPHILS NFR BLD: 0.5 % (ref 0–1.9)
BILIRUB SERPL-MCNC: 0.2 MG/DL (ref 0.1–1)
BILIRUB SERPL-MCNC: 0.3 MG/DL (ref 0.1–1)
BILIRUB SERPL-MCNC: 0.3 MG/DL (ref 0.1–1)
BUN SERPL-MCNC: 11 MG/DL (ref 6–20)
BUN SERPL-MCNC: 13 MG/DL (ref 6–20)
BUN SERPL-MCNC: 5 MG/DL (ref 6–20)
BUN SERPL-MCNC: 7 MG/DL (ref 6–20)
BUN SERPL-MCNC: 8 MG/DL (ref 6–20)
BUN SERPL-MCNC: 8 MG/DL (ref 6–20)
CALCIUM SERPL-MCNC: 8.1 MG/DL (ref 8.7–10.5)
CALCIUM SERPL-MCNC: 8.2 MG/DL (ref 8.7–10.5)
CALCIUM SERPL-MCNC: 8.2 MG/DL (ref 8.7–10.5)
CALCIUM SERPL-MCNC: 8.3 MG/DL (ref 8.7–10.5)
CALCIUM SERPL-MCNC: 8.3 MG/DL (ref 8.7–10.5)
CALCIUM SERPL-MCNC: 8.5 MG/DL (ref 8.7–10.5)
CHLORIDE SERPL-SCNC: 105 MMOL/L (ref 95–110)
CHLORIDE SERPL-SCNC: 109 MMOL/L (ref 95–110)
CHLORIDE SERPL-SCNC: 110 MMOL/L (ref 95–110)
CHLORIDE SERPL-SCNC: 110 MMOL/L (ref 95–110)
CO2 SERPL-SCNC: 21 MMOL/L (ref 23–29)
CO2 SERPL-SCNC: 24 MMOL/L (ref 23–29)
CO2 SERPL-SCNC: 24 MMOL/L (ref 23–29)
CO2 SERPL-SCNC: 25 MMOL/L (ref 23–29)
CO2 SERPL-SCNC: 25 MMOL/L (ref 23–29)
CO2 SERPL-SCNC: 27 MMOL/L (ref 23–29)
CREAT SERPL-MCNC: 0.9 MG/DL (ref 0.5–1.4)
CREAT SERPL-MCNC: 1 MG/DL (ref 0.5–1.4)
CREAT SERPL-MCNC: 1.1 MG/DL (ref 0.5–1.4)
CREAT SERPL-MCNC: 1.1 MG/DL (ref 0.5–1.4)
DIFFERENTIAL METHOD: ABNORMAL
EOSINOPHIL # BLD AUTO: 0 K/UL (ref 0–0.5)
EOSINOPHIL NFR BLD: 0.2 % (ref 0–8)
ERYTHROCYTE [DISTWIDTH] IN BLOOD BY AUTOMATED COUNT: 12.5 % (ref 11.5–14.5)
EST. GFR  (AFRICAN AMERICAN): >60 ML/MIN/1.73 M^2
EST. GFR  (NON AFRICAN AMERICAN): >60 ML/MIN/1.73 M^2
GLUCOSE SERPL-MCNC: 186 MG/DL (ref 70–110)
GLUCOSE SERPL-MCNC: 198 MG/DL (ref 70–110)
GLUCOSE SERPL-MCNC: 218 MG/DL (ref 70–110)
GLUCOSE SERPL-MCNC: 257 MG/DL (ref 70–110)
GLUCOSE SERPL-MCNC: 272 MG/DL (ref 70–110)
GLUCOSE SERPL-MCNC: 315 MG/DL (ref 70–110)
HCT VFR BLD AUTO: 38.7 % (ref 40–54)
HGB BLD-MCNC: 12.5 G/DL (ref 14–18)
IMM GRANULOCYTES # BLD AUTO: 0.31 K/UL (ref 0–0.04)
IMM GRANULOCYTES NFR BLD AUTO: 5 % (ref 0–0.5)
LYMPHOCYTES # BLD AUTO: 1.4 K/UL (ref 1–4.8)
LYMPHOCYTES NFR BLD: 21.9 % (ref 18–48)
MAGNESIUM SERPL-MCNC: 1.7 MG/DL (ref 1.6–2.6)
MCH RBC QN AUTO: 29.1 PG (ref 27–31)
MCHC RBC AUTO-ENTMCNC: 32.3 G/DL (ref 32–36)
MCV RBC AUTO: 90 FL (ref 82–98)
MONOCYTES # BLD AUTO: 0.6 K/UL (ref 0.3–1)
MONOCYTES NFR BLD: 9.4 % (ref 4–15)
NEUTROPHILS # BLD AUTO: 3.9 K/UL (ref 1.8–7.7)
NEUTROPHILS NFR BLD: 63 % (ref 38–73)
NRBC BLD-RTO: 0 /100 WBC
PHOSPHATE SERPL-MCNC: 1.6 MG/DL (ref 2.7–4.5)
PHOSPHATE SERPL-MCNC: 2.2 MG/DL (ref 2.7–4.5)
PHOSPHATE SERPL-MCNC: 2.5 MG/DL (ref 2.7–4.5)
PHOSPHATE SERPL-MCNC: 3.1 MG/DL (ref 2.7–4.5)
PHOSPHATE SERPL-MCNC: 3.2 MG/DL (ref 2.7–4.5)
PHOSPHATE SERPL-MCNC: 3.2 MG/DL (ref 2.7–4.5)
PLATELET # BLD AUTO: 265 K/UL (ref 150–350)
PMV BLD AUTO: 11.4 FL (ref 9.2–12.9)
POCT GLUCOSE: 172 MG/DL (ref 70–110)
POCT GLUCOSE: 175 MG/DL (ref 70–110)
POCT GLUCOSE: 181 MG/DL (ref 70–110)
POCT GLUCOSE: 188 MG/DL (ref 70–110)
POCT GLUCOSE: 190 MG/DL (ref 70–110)
POCT GLUCOSE: 191 MG/DL (ref 70–110)
POCT GLUCOSE: 198 MG/DL (ref 70–110)
POCT GLUCOSE: 202 MG/DL (ref 70–110)
POCT GLUCOSE: 204 MG/DL (ref 70–110)
POCT GLUCOSE: 205 MG/DL (ref 70–110)
POCT GLUCOSE: 211 MG/DL (ref 70–110)
POCT GLUCOSE: 216 MG/DL (ref 70–110)
POCT GLUCOSE: 221 MG/DL (ref 70–110)
POCT GLUCOSE: 226 MG/DL (ref 70–110)
POCT GLUCOSE: 242 MG/DL (ref 70–110)
POCT GLUCOSE: 247 MG/DL (ref 70–110)
POCT GLUCOSE: 260 MG/DL (ref 70–110)
POCT GLUCOSE: 285 MG/DL (ref 70–110)
POCT GLUCOSE: 299 MG/DL (ref 70–110)
POCT GLUCOSE: 313 MG/DL (ref 70–110)
POCT GLUCOSE: 315 MG/DL (ref 70–110)
POCT GLUCOSE: 316 MG/DL (ref 70–110)
POCT GLUCOSE: >500 MG/DL (ref 70–110)
POTASSIUM SERPL-SCNC: 3.5 MMOL/L (ref 3.5–5.1)
POTASSIUM SERPL-SCNC: 3.6 MMOL/L (ref 3.5–5.1)
POTASSIUM SERPL-SCNC: 3.6 MMOL/L (ref 3.5–5.1)
POTASSIUM SERPL-SCNC: 3.7 MMOL/L (ref 3.5–5.1)
POTASSIUM SERPL-SCNC: 4.4 MMOL/L (ref 3.5–5.1)
POTASSIUM SERPL-SCNC: 4.5 MMOL/L (ref 3.5–5.1)
PROT SERPL-MCNC: 7.3 G/DL (ref 6–8.4)
PROT SERPL-MCNC: 7.4 G/DL (ref 6–8.4)
PROT SERPL-MCNC: 7.6 G/DL (ref 6–8.4)
PROT SERPL-MCNC: 7.7 G/DL (ref 6–8.4)
RBC # BLD AUTO: 4.3 M/UL (ref 4.6–6.2)
SODIUM SERPL-SCNC: 142 MMOL/L (ref 136–145)
SODIUM SERPL-SCNC: 144 MMOL/L (ref 136–145)
SODIUM SERPL-SCNC: 145 MMOL/L (ref 136–145)
WBC # BLD AUTO: 6.25 K/UL (ref 3.9–12.7)

## 2020-03-11 PROCEDURE — 99233 SBSQ HOSP IP/OBS HIGH 50: CPT | Mod: ,,, | Performed by: PODIATRIST

## 2020-03-11 PROCEDURE — 83735 ASSAY OF MAGNESIUM: CPT

## 2020-03-11 PROCEDURE — 99233 PR SUBSEQUENT HOSPITAL CARE,LEVL III: ICD-10-PCS | Mod: ,,, | Performed by: STUDENT IN AN ORGANIZED HEALTH CARE EDUCATION/TRAINING PROGRAM

## 2020-03-11 PROCEDURE — 63600175 PHARM REV CODE 636 W HCPCS: Performed by: PHYSICIAN ASSISTANT

## 2020-03-11 PROCEDURE — A9585 GADOBUTROL INJECTION: HCPCS | Performed by: STUDENT IN AN ORGANIZED HEALTH CARE EDUCATION/TRAINING PROGRAM

## 2020-03-11 PROCEDURE — 25000003 PHARM REV CODE 250: Performed by: STUDENT IN AN ORGANIZED HEALTH CARE EDUCATION/TRAINING PROGRAM

## 2020-03-11 PROCEDURE — 80053 COMPREHEN METABOLIC PANEL: CPT | Mod: 91

## 2020-03-11 PROCEDURE — 99233 PR SUBSEQUENT HOSPITAL CARE,LEVL III: ICD-10-PCS | Mod: ,,, | Performed by: PHYSICIAN ASSISTANT

## 2020-03-11 PROCEDURE — 25500020 PHARM REV CODE 255: Performed by: STUDENT IN AN ORGANIZED HEALTH CARE EDUCATION/TRAINING PROGRAM

## 2020-03-11 PROCEDURE — 87040 BLOOD CULTURE FOR BACTERIA: CPT

## 2020-03-11 PROCEDURE — 63600175 PHARM REV CODE 636 W HCPCS: Performed by: STUDENT IN AN ORGANIZED HEALTH CARE EDUCATION/TRAINING PROGRAM

## 2020-03-11 PROCEDURE — 80053 COMPREHEN METABOLIC PANEL: CPT

## 2020-03-11 PROCEDURE — 84100 ASSAY OF PHOSPHORUS: CPT

## 2020-03-11 PROCEDURE — 85007 BL SMEAR W/DIFF WBC COUNT: CPT

## 2020-03-11 PROCEDURE — 85027 COMPLETE CBC AUTOMATED: CPT

## 2020-03-11 PROCEDURE — 99233 PR SUBSEQUENT HOSPITAL CARE,LEVL III: ICD-10-PCS | Mod: ,,, | Performed by: PODIATRIST

## 2020-03-11 PROCEDURE — 93922 UPR/L XTREMITY ART 2 LEVELS: CPT | Performed by: SURGERY

## 2020-03-11 PROCEDURE — S0030 INJECTION, METRONIDAZOLE: HCPCS | Performed by: STUDENT IN AN ORGANIZED HEALTH CARE EDUCATION/TRAINING PROGRAM

## 2020-03-11 PROCEDURE — 20600001 HC STEP DOWN PRIVATE ROOM

## 2020-03-11 PROCEDURE — 63600175 PHARM REV CODE 636 W HCPCS: Performed by: EMERGENCY MEDICINE

## 2020-03-11 PROCEDURE — 99233 SBSQ HOSP IP/OBS HIGH 50: CPT | Mod: ,,, | Performed by: PHYSICIAN ASSISTANT

## 2020-03-11 PROCEDURE — 99233 SBSQ HOSP IP/OBS HIGH 50: CPT | Mod: ,,, | Performed by: STUDENT IN AN ORGANIZED HEALTH CARE EDUCATION/TRAINING PROGRAM

## 2020-03-11 PROCEDURE — 36415 COLL VENOUS BLD VENIPUNCTURE: CPT

## 2020-03-11 PROCEDURE — 84100 ASSAY OF PHOSPHORUS: CPT | Mod: 91

## 2020-03-11 RX ORDER — PROCHLORPERAZINE EDISYLATE 5 MG/ML
2.5 INJECTION INTRAMUSCULAR; INTRAVENOUS ONCE AS NEEDED
Status: COMPLETED | OUTPATIENT
Start: 2020-03-11 | End: 2020-03-11

## 2020-03-11 RX ORDER — GADOBUTROL 604.72 MG/ML
10 INJECTION INTRAVENOUS
Status: COMPLETED | OUTPATIENT
Start: 2020-03-11 | End: 2020-03-11

## 2020-03-11 RX ORDER — ACETAMINOPHEN 500 MG
1000 TABLET ORAL EVERY 8 HOURS PRN
Status: DISCONTINUED | OUTPATIENT
Start: 2020-03-11 | End: 2020-03-12

## 2020-03-11 RX ORDER — HYDROXYZINE HYDROCHLORIDE 25 MG/1
50 TABLET, FILM COATED ORAL ONCE
Status: DISCONTINUED | OUTPATIENT
Start: 2020-03-11 | End: 2020-03-16

## 2020-03-11 RX ADMIN — ACETAMINOPHEN 1000 MG: 500 TABLET ORAL at 07:03

## 2020-03-11 RX ADMIN — METRONIDAZOLE 500 MG: 500 INJECTION, SOLUTION INTRAVENOUS at 01:03

## 2020-03-11 RX ADMIN — GADOBUTROL 10 ML: 604.72 INJECTION INTRAVENOUS at 01:03

## 2020-03-11 RX ADMIN — SODIUM CHLORIDE 2.7 UNITS/HR: 9 INJECTION, SOLUTION INTRAVENOUS at 04:03

## 2020-03-11 RX ADMIN — PROMETHAZINE HYDROCHLORIDE 6.25 MG: 25 INJECTION INTRAMUSCULAR; INTRAVENOUS at 04:03

## 2020-03-11 RX ADMIN — ONDANSETRON 8 MG: 8 TABLET, ORALLY DISINTEGRATING ORAL at 12:03

## 2020-03-11 RX ADMIN — SODIUM CHLORIDE 5.2 UNITS/HR: 9 INJECTION, SOLUTION INTRAVENOUS at 06:03

## 2020-03-11 RX ADMIN — VANCOMYCIN HYDROCHLORIDE 1500 MG: 1.5 INJECTION, POWDER, LYOPHILIZED, FOR SOLUTION INTRAVENOUS at 11:03

## 2020-03-11 RX ADMIN — SODIUM CHLORIDE 2.5 UNITS/HR: 9 INJECTION, SOLUTION INTRAVENOUS at 12:03

## 2020-03-11 RX ADMIN — POTASSIUM CHLORIDE: 149 INJECTION, SOLUTION, CONCENTRATE INTRAVENOUS at 12:03

## 2020-03-11 RX ADMIN — ONDANSETRON 8 MG: 8 TABLET, ORALLY DISINTEGRATING ORAL at 03:03

## 2020-03-11 RX ADMIN — PROCHLORPERAZINE EDISYLATE 2.5 MG: 5 INJECTION INTRAMUSCULAR; INTRAVENOUS at 07:03

## 2020-03-11 RX ADMIN — DEXTROSE 6 G: 5 SOLUTION INTRAVENOUS at 04:03

## 2020-03-11 RX ADMIN — SODIUM CHLORIDE 2.6 UNITS/HR: 9 INJECTION, SOLUTION INTRAVENOUS at 07:03

## 2020-03-11 RX ADMIN — POTASSIUM CHLORIDE: 149 INJECTION, SOLUTION, CONCENTRATE INTRAVENOUS at 11:03

## 2020-03-11 RX ADMIN — METRONIDAZOLE 500 MG: 500 INJECTION, SOLUTION INTRAVENOUS at 09:03

## 2020-03-11 RX ADMIN — POTASSIUM CHLORIDE: 149 INJECTION, SOLUTION, CONCENTRATE INTRAVENOUS at 07:03

## 2020-03-11 RX ADMIN — PIPERACILLIN SODIUM AND TAZOBACTAM SODIUM 4.5 G: 4; .5 INJECTION, POWDER, LYOPHILIZED, FOR SOLUTION INTRAVENOUS at 11:03

## 2020-03-11 RX ADMIN — POTASSIUM CHLORIDE: 149 INJECTION, SOLUTION, CONCENTRATE INTRAVENOUS at 06:03

## 2020-03-11 RX ADMIN — SODIUM PHOSPHATE, MONOBASIC, MONOHYDRATE 30 MMOL: 276; 142 INJECTION, SOLUTION INTRAVENOUS at 09:03

## 2020-03-11 NOTE — PLAN OF CARE
Pt AAO;assist oob . Pt was hypertensive this shift and febrile with a max temp of 102.  Patient received tylenol and fever resolved. Patient has a positive BC and is now on continuous ABX ,   MRI of the left foot done this shift. Infectious disease in to see patient this shift Surgery also in to see patient and gave the patient options. Patient continues on insulin drip at current rate see. MAR.   Pt receiving antibiotics and continuous IV fluids   Pt remained free from falls during shift.  Bed lowest position and locked.  Call light in reach.  [facility]

## 2020-03-11 NOTE — ASSESSMENT & PLAN NOTE
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 His blood cultures on admit are positive with GPCs. He was seen by podiatry 3/5/2020. Bone and wound cultures +MSSA. He was given course of doxycycline.     He is currently on vancomycin and zosyn. MRI studies confirms acute osteomyelitis of the 4th metatarsal head and neck and proximal phalanx.  No rim enhancing fluid collection or drainable abscess.  ALISSA studies wnl      Recommendations  1. Continue cefazolin 6g continuous infusion. Discontinued vancomycin and zosyn as prior cultures +MSSA.   2. Podiatry recommending 4th digit amputation tomorrow. Please send clean margin bone for cultures and pathology  3. Blood cultures on admit +GPCs. Likely MSSA. Continue cefazolin. Received two doses of vancomycin.  Repeat blood cultures are pending. If clinically decompensate with add vancomycin.

## 2020-03-11 NOTE — CONSULTS
[facility]  Podiatry  Consult Note    Patient Name: Indio Ryder Jr.  MRN: [id number]  Admission Date: 3/10/2020  Hospital Length of Stay: 1 days  Attending Physician: Antonio Valladares MD  Primary Care Provider: Lorena Thakur MD     Inpatient consult to Podiatry  Consult performed by: Romy Hi MD  Consult ordered by: Vicky Singh MD        Subjective:     History of Present Illness:  Mr. Ryder is 49 y.o M well known to our podiatry service with PMHx of poorly controlled DM2 ( last Ha1c 8.4), DKA with coma, diabetic neuropathy, diabatic retinopathy and multiple diabetic foot ulcers with subsequent amputations who presented to [facility] ED on 3/10/2020  with chief complaint of nausea, vomiting, fever and chills for the past 2 days. He also reports poor appetite with decreased oral intake x 1 week.     He has been seeing podiatry on and off for management of multiple diabetic ulcers since 2015. He was last seen in podiatry clinic 3/5/2020 by Dr. Gage Velazquez in which left foot ulcer was debrided, wound and bone cultures were obtained and bone biopsy sent to pathology. Left foot was offloaded in football dressing with instructions to keep dressings dry and intact and to follow up in 1 week. He was prescribed a 10 day supply of doxycyline which he has been compliant with. His most recent amputation was done 7/14/2019 for a partial 5th ray amputation. Denies any pedal pain. He has been full weightbearing in his own personal shoes.    In the ED, he was afebrile, vitally stable. WBC: 6.9, lactate: 1.2. X-ray of right food shows soft tissue edema concerning for osteomyelitis. He received single dose of vanc/zosyn and 2L IVF.     Scheduled Meds:   atorvastatin  80 mg Oral Daily    ceFAZolin(ANCEF) in D5W 500 mL CONTINUOUS INFUSION  6 g Intravenous Q24H    gabapentin  600 mg Oral Daily    hydroxyzine HCL  50 mg Oral Once     Continuous Infusions:   custom IV infusion builder 200  mL/hr at 03/11/20 1122    insulin (HUMAN R) infusion (adults) 2.7 Units/hr (03/11/20 0435)     PRN Meds:acetaminophen, dextrose 10 % in water (D10W), dextrose 10 % in water (D10W), Dextrose 10% Bolus, Dextrose 10% Bolus, glucagon (human recombinant), glucose, glucose, hydrALAZINE, ondansetron, potassium chloride 10%, promethazine (PHENERGAN) IVPB, sodium chloride 0.9%, sodium chloride 0.9%    Review of patient's allergies indicates:  No Known Allergies     Past Medical History:   Diagnosis Date    Actinomyces infection 1/17/2017    Right foot    Diabetic ketoacidosis without coma associated with type 2 diabetes mellitus 5/30/2017    Diabetic ulcer of right foot associated with type 2 diabetes mellitus 6/3/2015    Essential hypertension 6/6/2013    Group B streptococcal infection 1/13/2017    Mixed hyperlipidemia 8/12/2014    Shoulder impingement 8/12/2014    Subacute osteomyelitis of right foot 1/12/2017    Streptococcus agalactiae, Actinomyces odontolyticus    Traumatic amputation of fifth toe of right foot 07/02/2015    Type 2 diabetes mellitus with diabetic neuropathy, with long-term current use of insulin 5/3/2016    Ulcerative colitis, unspecified      Past Surgical History:   Procedure Laterality Date    DEBRIDEMENT OF FOOT Left 7/14/2019    Procedure: DEBRIDEMENT, FOOT, with left 5th ray amputation;  Surgeon: Sis Hickman DPM;  Location: [address];  Service: Podiatry;  Laterality: Left;    DEBRIDEMENT OF FOOT Left 7/17/2019    Procedure: DEBRIDEMENT, FOOT with leftt 5th ray partial amputation with Neox Graft;  Surgeon: Mai Burrell DPM;  Location: [facility] OR [address] [place] [address];  Service: Podiatry;  Laterality: Left;  t    TOE AMPUTATION Right 06/05/2015    TOE AMPUTATION Right 01/19/2017       Family History     Problem Relation (Age of Onset)    Cancer Mother    Cataracts Father    Diabetes Mother, Sister    Hypertension Mother, Father, Maternal Aunt    No Known Problems Brother,  Sister, Sister, Maternal Uncle, Paternal Aunt, Paternal Uncle, Maternal Grandmother, Maternal Grandfather, Paternal Grandmother, Paternal Grandfather        Tobacco Use    Smoking status: Never Smoker    Smokeless tobacco: Never Used   Substance and Sexual Activity    Alcohol use: No    Drug use: No    Sexual activity: Yes     Partners: Female     Birth control/protection: Condom     Review of Systems   Constitutional: Positive for appetite change, chills, fatigue and fever.   Gastrointestinal: Positive for nausea and vomiting. Negative for diarrhea.   Musculoskeletal: Negative for arthralgias, joint swelling and myalgias.   Skin: Positive for wound.   Neurological: Positive for weakness and numbness.   Psychiatric/Behavioral: Negative for behavioral problems and confusion.     Objective:     Vital Signs (Most Recent):  Temp: 99.7 °F (37.6 °C) (03/11/20 1130)  Pulse: 93 (03/11/20 1130)  Resp: 15 (03/11/20 1130)  BP: (!) 163/91 (03/11/20 1130)  SpO2: 95 % (03/11/20 0800) Vital Signs (24h Range):  Temp:  [98 °F (36.7 °C)-102 °F (38.9 °C)] 99.7 °F (37.6 °C)  Pulse:  [] 93  Resp:  [15-20] 15  SpO2:  [93 %-98 %] 95 %  BP: (120-183)/() 163/91     Weight: 95 kg (209 lb 7 oz)  Body mass index is 27.63 kg/m².    Foot Exam    General  Orientation: alert and oriented to person, place, and time   Affect: appropriate       Right Foot/Ankle     Inspection and Palpation  Ecchymosis: none  Tenderness: none   Swelling: none   Skin Exam: skin intact;     Neurovascular  Dorsalis pedis: 1+  Posterior tibial: 1+  Saphenous nerve sensation: diminished  Tibial nerve sensation: diminished  Superficial peroneal nerve sensation: diminished  Deep peroneal nerve sensation: diminished  Sural nerve sensation: diminished    Comments  Amputation of 1,5 digits    Left Foot/Ankle      Inspection and Palpation  Ecchymosis: none  Tenderness: none   Swelling: none   Skin Exam: drainage and ulcer;     Neurovascular  Dorsalis pedis:  1+  Posterior tibial: 1+  Saphenous nerve sensation: diminished  Tibial nerve sensation: diminished  Superficial peroneal nerve sensation: diminished  Deep peroneal nerve sensation: diminished  Sural nerve sensation: diminished    Comments  Partial 5th ray amputation      Laboratory:  A1C:   Recent Labs   Lab 12/03/19  1019   HGBA1C 8.4*     CBC:   Recent Labs   Lab 03/11/20  0340   WBC 6.25   RBC 4.30*   HGB 12.5*   HCT 38.7*      MCV 90   MCH 29.1   MCHC 32.3     CMP:   Recent Labs   Lab 03/11/20  1156   *   CALCIUM 8.1*   ALBUMIN 2.2*   PROT 7.3      K 3.5   CO2 27      BUN 8   CREATININE 1.0   ALKPHOS 74   ALT 7*   AST 14   BILITOT 0.2     CRP:   Recent Labs   Lab 03/05/20  1617   CRP 75.3*     ESR:   Recent Labs   Lab 03/05/20  1617   SEDRATE 35*     Wound Cultures:   Recent Labs   Lab 03/05/20  1611 03/05/20  1612   LABAERO STAPHYLOCOCCUS AUREUS  Many  Skin janes also present  * STAPHYLOCOCCUS AUREUS  Moderate  Skin janes also present  *     Microbiology Results (last 7 days)     Procedure Component Value Units Date/Time    Blood culture [[id number]] Collected:  03/11/20 1156    Order Status:  Sent Specimen:  Blood Updated:  03/11/20 1201    Blood culture [[id number]] Collected:  03/10/20 1213    Order Status:  Completed Specimen:  Blood from Peripheral, Wrist, Right Updated:  03/11/20 0115     Blood Culture, Routine No Growth to date    Blood culture [[id number]] Collected:  03/10/20 1057    Order Status:  Completed Specimen:  Blood from Peripheral, Antecubital, Left Updated:  03/11/20 0115     Blood Culture, Routine No Growth to date        Specimen (12h ago, onward)    None          Diagnostic Results:  I have reviewed all pertinent imaging results/findings within the past 24 hours.   Imaging Results          X-Ray Foot Complete Left (Final result)  Result time 03/10/20 11:22:03    Final result by Vinnie Cortés III, MD (03/10/20 11:22:03)                 Narrative:     EXAMINATION:  XR FOOT COMPLETE 3 VIEW LEFT    CLINICAL HISTORY:  Non-pressure chronic ulcer of other part of left foot with unspecified severity    FINDINGS:  No fracture dislocation seen.  There is amputation of the 5th digit and partial amputation of the distal 5th metatarsal.  There is soft tissue swelling.  Osteomyelitis of the distal aspect of the remaining 5th metatarsal could not be excluded.  MRI could be helpful.      Electronically signed by: Vinnie Cortés MD  Date:    03/10/2020  Time:    11:22                                Clinical Findings:  Left foot ulcer  Location: Plantar 3rd/4th metatarsal head  Wound base: 20% granular; 80% fibrotic.  Periwound: viable  Drainage: purulent  Signs of infection: +probes to bone, malodorous, purulent drainage.  Wound tracks at 9 o clock to 3rd metatarsal head and distally at 12 o clock to 4th toe. No erythema, no edema.            Assessment/Plan:     Diabetic ulcer of left midfoot associated with type 2 diabetes mellitus, with bone involvement without evidence of necrosis  Mr. Ryder is 49 y.o M well known to our podiatry service [place] [facility] [place]2020  with chief complaint of nausea, vomiting, fever and chills  for the past 2 days and decreased appetite for past week.   He was last seen in podiatry clinic 3/5/2020 and prescribed doxycyline with no improvement in foot ulcer.    In the ED, he was afebrile, vitally stable. WBC: 6.9, lactate: 1.2. X-ray of right food shows soft tissue edema concerning for osteomyelitis. He received single dose of vanc/zosyn and 2L IVF.     Plan:   Continue IV antibiotics.  IV fluids  MRI left forefoot demonstrating acute osteomyelitis of the 4th metatarsal head and neck and proximal phalanx.  No rim enhancing fluid collection or drainable abscess.  Discussed with patient his treatment options: bone biopsy vs amputation. We recommend amputation given the extent of the ulceration and now with + blood cx. Patient refusing  amputation at this time. Will attempt to discuss amputation again tomorrow morning. If patient still refuses, will wash out wound at bedside and obtain bone biopsy.      Mixed hyperlipidemia  Per primary    Essential hypertension  Per primary        Thank you for your consult. I will follow-up with patient. Please contact us if you have any additional questions.    Romy Hi MD  Podiatry  [facility]

## 2020-03-11 NOTE — SUBJECTIVE & OBJECTIVE
Interval History: pt still with nausea and vomiting and no tolerating. . Pt has a spiking fever 102.   Review of Systems   Constitutional: Positive for appetite change and chills. Negative for activity change, fatigue and fever.   HENT: Negative for congestion, facial swelling, sore throat and trouble swallowing.    Respiratory: Negative for apnea, cough, chest tightness, shortness of breath and wheezing.    Cardiovascular: Negative for chest pain, palpitations and leg swelling.   Gastrointestinal: Positive for nausea and vomiting. Negative for abdominal distention, abdominal pain, blood in stool and diarrhea.   Genitourinary: Negative for difficulty urinating, dysuria, flank pain and hematuria.   Musculoskeletal: Negative for arthralgias, back pain, myalgias and neck pain.   Skin: Negative for color change and rash.   Neurological: Negative for dizziness, weakness, light-headedness, numbness and headaches.   Hematological: Negative for adenopathy.   Psychiatric/Behavioral: Negative for agitation and behavioral problems.     Objective:     Vital Signs (Most Recent):  Temp: 98.4 °F (36.9 °C) (03/11/20 1525)  Pulse: 94 (03/11/20 1525)  Resp: 17 (03/11/20 1525)  BP: (!) 172/100 (03/11/20 1525)  SpO2: 96 % (03/11/20 1525) Vital Signs (24h Range):  Temp:  [98 °F (36.7 °C)-102 °F (38.9 °C)] 98.4 °F (36.9 °C)  Pulse:  [] 94  Resp:  [15-18] 17  SpO2:  [93 %-98 %] 96 %  BP: (120-183)/() 172/100     Weight: 95 kg (209 lb 7 oz)  Body mass index is 27.63 kg/m².    Intake/Output Summary (Last 24 hours) at 3/11/2020 1623  Last data filed at 3/11/2020 0500  Gross per 24 hour   Intake 1050 ml   Output 625 ml   Net 425 ml      Physical Exam   Constitutional: He is oriented to person, place, and time. He appears well-developed and well-nourished. No distress.   Looks ill    HENT:   Head: Normocephalic and atraumatic.   Eyes: Pupils are equal, round, and reactive to light. Conjunctivae and EOM are normal.   Neck:  Normal range of motion. Neck supple.   Cardiovascular: Normal rate, regular rhythm and normal heart sounds.   No murmur heard.  Pulmonary/Chest: Effort normal and breath sounds normal. No respiratory distress. He has no wheezes. He has no rales.   Abdominal: Soft. Bowel sounds are normal. He exhibits no distension. There is no tenderness.   Musculoskeletal: He exhibits no edema or deformity.   Lymphadenopathy:     He has no cervical adenopathy.   Neurological: He is alert and oriented to person, place, and time. He displays normal reflexes. No cranial nerve deficit or sensory deficit. He exhibits normal muscle tone.   Skin: Skin is warm. Capillary refill takes less than 2 seconds. He is not diaphoretic.   Psychiatric: He has a normal mood and affect. His behavior is normal.       Significant Labs:   Blood Culture:   Recent Labs   Lab 03/10/20  1057 03/10/20  1213   LABBLOO No Growth to date  No Growth to date Gram stain aer bottle: Gram positive cocci in clusters resembling Staph   Results called to and read back by: Erma Daniel RN 03/11/2020    14:54  Gram stain mary bottle: Gram positive cocci in clusters resembling Staph     CBC:   Recent Labs   Lab 03/10/20  1057 03/11/20  0340   WBC 6.95 6.25   HGB 12.7* 12.5*   HCT 40.8 38.7*    265     CMP:   Recent Labs   Lab 03/11/20  0340 03/11/20  0830 03/11/20  1156    144 145   K 4.5 3.6 3.5    110 109   CO2 24 24 27   * 198* 218*   BUN 11 8 8   CREATININE 1.0 0.9 1.0   CALCIUM 8.2* 8.3* 8.1*   PROT 7.3 7.3 7.3   ALBUMIN 2.1* 2.3* 2.2*   BILITOT 0.2 0.2 0.2   ALKPHOS 73 77 74   AST 15 15 14   ALT 7* 8* 7*   ANIONGAP 11 10 9   EGFRNONAA >60.0 >60.0 >60.0     Pathology Results  (Last 10 years)    None          Significant Imaging: I have reviewed all pertinent imaging results/findings within the past 24 hours.

## 2020-03-11 NOTE — MEDICAL/APP STUDENT
Progress Note  Hospital Medicine    Primary Team: [facility] MED 5  Admit Date: 3/10/2020   Length of Stay:  LOS: 1 day   SUBJECTIVE:   Reason for Admission:  Diabetic ketoacidosis without coma associated with type 2 diabetes mellitus    HPI/Interval history:  Mr. Ryder is a 50 yo M w History of previous DKA without coma (5/30/17), Diabetic ulcer of R foot, T2DM with peripheral neuropathy, HTN, Subacute Osteomyelitis of R foot (1/12/17), and amputation of 5th toe of R foot (7/2/15) who is on Hospital day course 1 for DKA due to insulin noncompliance. Overnight, the patient was quite distressed and anxious. He states that he has been nauseous and vomiting throughout the night. He denies any abdominal pain, diarrhea, dizziness, or headaches.Pt currently  He states that he has been getting chills throughout the night but denies fevers.   The ulcer on his foot is open and not bandaged. Awaiting inpatient wound care consult for bandaging and observation. Pt currently     Review of Systems:  Review of Systems   Constitutional: Positive for chills and malaise/fatigue. Negative for fever.   Respiratory: Negative for cough, sputum production and wheezing.    Cardiovascular: Negative for chest pain and palpitations.   Gastrointestinal: Positive for nausea and vomiting. Negative for abdominal pain and diarrhea.   Musculoskeletal: Negative for myalgias.   Psychiatric/Behavioral: The patient is nervous/anxious.         OBJECTIVE:     Temp:  [97.8 °F (36.6 °C)-98.6 °F (37 °C)]   Pulse:  []   Resp:  [16-20]   BP: (120-183)/()   SpO2:  [93 %-99 %]  Body mass index is 27.63 kg/m².  Intake/Outake:  This Shift:  No intake/output data recorded.    Net I/O past 24h:     Intake/Output Summary (Last 24 hours) at 3/11/2020 0829  Last data filed at 3/11/2020 0500  Gross per 24 hour   Intake 2400 ml   Output 625 ml   Net 1775 ml             Physical Exam:  Physical Exam   Constitutional: He appears distressed.   HENT:   Head:  Normocephalic and atraumatic.   Cardiovascular: Normal rate, regular rhythm and normal heart sounds. Exam reveals no gallop and no friction rub.   No murmur heard.  Pulmonary/Chest: Effort normal and breath sounds normal. No respiratory distress. He has no wheezes. He has no rales.   Abdominal: Soft. Bowel sounds are normal. He exhibits no distension. There is no tenderness. There is no rebound and no guarding.   Skin: He is not diaphoretic.       Laboratory:  CBC/Anemia Labs: Coags:    Recent Labs   Lab 03/05/20  1617 03/10/20  1057 03/11/20  0340   WBC 8.29 6.95 6.25   HGB 12.8* 12.7* 12.5*   HCT 42.3 40.8 38.7*    327 265   MCV 96 93 90   RDW 12.5 12.3 12.5    No results for input(s): PT, INR, APTT in the last 168 hours.     Chemistries:   Recent Labs   Lab 03/10/20  1529  03/10/20  2043 03/11/20  0022 03/11/20  0340      < > 142 142 144   K 3.7   < > 3.8 3.6 4.5      < > 106 105 109   CO2 24   < > 27 25 24   BUN 15   < > 14 13 11   CREATININE 1.1   < > 1.1 1.1 1.0   CALCIUM 8.8   < > 8.4* 8.5* 8.2*   PROT 7.8   < > 7.1 7.7 7.3   BILITOT 0.3   < > 0.2 0.2 0.2   ALKPHOS 79   < > 73 78 73   ALT 7*   < > 6* 7* 7*   AST 15   < > 14 16 15   MG 1.8  --   --   --  1.7   PHOS 2.0*   < > 3.0 3.2 2.2*    < > = values in this interval not displayed.        Medications:  Scheduled Meds:   atorvastatin  80 mg Oral Daily    cefTRIAXone (ROCEPHIN) IVPB  2 g Intravenous Q24H    gabapentin  600 mg Oral Daily    hydroxyzine HCL  50 mg Oral Once    metronidazole  500 mg Intravenous Q8H    sodium phosphate IVPB  30 mmol Intravenous Once                             Continuous Infusions:   custom IV infusion builder 200 mL/hr at 03/11/20 0617    insulin (HUMAN R) infusion (adults) 2.7 Units/hr (03/11/20 0215)     PRN Meds:.acetaminophen, dextrose 10 % in water (D10W), dextrose 10 % in water (D10W), Dextrose 10% Bolus, Dextrose 10% Bolus, Dextrose 10% Bolus, Dextrose 10% Bolus, glucagon (human recombinant),  glucose, glucose, hydrALAZINE, ondansetron, potassium chloride 10%, promethazine (PHENERGAN) IVPB, sodium chloride 0.9%, sodium chloride 0.9%     ASSESSMENT/PLAN:     Active Hospital Problems    Diagnosis  POA    *Diabetic ketoacidosis without coma associated with type 2 diabetes mellitus [E11.10]  Yes    Diabetic ulcer of left midfoot associated with type 2 diabetes mellitus, with bone involvement without evidence of necrosis [E11.621, L97.426]  Yes    Impaired gait and mobility [R26.89]  Yes    Anemia [D64.9]  Yes    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

## 2020-03-11 NOTE — CONSULTS
Wound care consult received.   Consult also placed for podiatry to see patient for foot wound.   Wound care will defer assessment to podiatry.   Please reconsult wound care team if needed.   Oksana JONES, BSN, RN, COCN, [facility]  [id number]

## 2020-03-11 NOTE — ASSESSMENT & PLAN NOTE
-- Pt has a hx of poorly controled DM   -- hx of diabetic ulcer with multiple amputations   -- currently treated with doxycyline   -- wound cx positive for S. aureus      Plan :   -- started on rocephin and flagyl   -- consult podiatry, applicate their help   -- MRI with acute osteomyelitis of 4th metatarsal head and neck of left proximal phalanx  -- podiatry plan for amputation.   -- ID recommend to switch to cefazolin.

## 2020-03-11 NOTE — HPI
Mr. Ryder is 50 y/o with a history of poorly controlled DM2 ( last Ha1c 8.4 ) , DKA with coma, diabetic neuropathy, diabatic retinopathy and diabetic foot with multiple amputation who presents to ED with chief complaint of chills associated with nausea and vomiting for one day. He sates that yesterday he started to feel ill and he has been having chills associated with nauseas and vomiting. He also endorses poor appetite with decreased oral intake for the last week. Denies fever, abdominal pain, feet or leg pain, leg swelling, CP, SOB, dysuria. He a history of poorly control diabetes with amputation of his right big and 5th toes. He undergone left big toe detriment and imputation on 7/14/2019. He reports not feeling well for the last couple of weeks and he felt about to pass out on Mardi gras and his BP was in 90/50. He was seen by podiatry last  Thursday when he had his wound opened and he was prescribed doxycycline for 10 days.    Bone cultures +MSSA on 3/5/2020. He is currently on vancomycin and zosyn.     MRI studies confirms acute osteomyelitis of the 4th metatarsal head and neck and proximal phalanx.  No rim enhancing fluid collection or drainable abscess.  ALISSA studies wnl

## 2020-03-11 NOTE — PROGRESS NOTES
[facility] Hospital Medicine  Progress Note    Patient Name: Indio Ryder Jr.  MRN: [id number]  Patient Class: IP- Inpatient   Admission Date: 3/10/2020  Length of Stay: 1 days  Attending Physician: Antonio Valladares MD  Primary Care Provider: Lorena Thakur MD    [facility] Medicine Team: [facility] MED 5 Vicky Singh MD    Subjective:     Principal Problem:Diabetic ketoacidosis without coma associated with type 2 diabetes mellitus        HPI:  Mr. Ryder is 48 y/o with a history of poorly controlled DM2 ( last Ha1c 8.4 ) , DKA with coma, diabetic neuropathy, diabatic retinopathy and diabetic foot with multiple amputation who presents to ED with chief complaint of chills associated with nausea and vomiting for one day. He sates that yesterday he started to feel ill and he has been having chills associated with nauseas and vomiting. He also endorses poor appetite with decreased oral intake for the last week. Denies fever, abdominal pain, feet or leg pain, leg swelling, CP, SOB, dysuria. He a history of poorly control diabetes with amputation of his right big and 5th toes. He undergone left big toe detriment and imputation on 7/14/2019. He reports not feeling well for the last couple of weeks and he felt about to pass out on [place] gr[place] and his BP was in 90/50. He was seen by podiatry last  Thursday when he had his wound opened and he was prescribed doxycycline for 10 days.     In ED, he was afebrile, vitally stable. Lab, BS: 382, B-hydroxybutyrate 3.6, HCO3 22,  anion gap 18. WBC: 6.9, lactate: 1.2  X-ray of right food shows soft tissue edema concerning for osteomyelitis. He received single dose of vanc/zosyn and 2L IVF.     Overview/Hospital Course:  Patient with a history of DM2, DKA with coma, osteomyelitis, multiple amputation who present with chills, N/V and found with DKA. Started on insuline gtt and IVF infusion. Pt is currently treated with doxycyline for wound in left foot. ID  and podiatry consulted. MRI with acute osteomyelitis of 4th metatarsal head and neck of left proximal phalanx. ID recommended cefazolin and podiatry plan for for amputation.     Interval History: pt still with nausea and vomiting and no tolerating. . Pt has a spiking fever 102.   Review of Systems   Constitutional: Positive for appetite change and chills. Negative for activity change, fatigue and fever.   HENT: Negative for congestion, facial swelling, sore throat and trouble swallowing.    Respiratory: Negative for apnea, cough, chest tightness, shortness of breath and wheezing.    Cardiovascular: Negative for chest pain, palpitations and leg swelling.   Gastrointestinal: Positive for nausea and vomiting. Negative for abdominal distention, abdominal pain, blood in stool and diarrhea.   Genitourinary: Negative for difficulty urinating, dysuria, flank pain and hematuria.   Musculoskeletal: Negative for arthralgias, back pain, myalgias and neck pain.   Skin: Negative for color change and rash.   Neurological: Negative for dizziness, weakness, light-headedness, numbness and headaches.   Hematological: Negative for adenopathy.   Psychiatric/Behavioral: Negative for agitation and behavioral problems.     Objective:     Vital Signs (Most Recent):  Temp: 98.4 °F (36.9 °C) (03/11/20 1525)  Pulse: 94 (03/11/20 1525)  Resp: 17 (03/11/20 1525)  BP: (!) 172/100 (03/11/20 1525)  SpO2: 96 % (03/11/20 1525) Vital Signs (24h Range):  Temp:  [98 °F (36.7 °C)-102 °F (38.9 °C)] 98.4 °F (36.9 °C)  Pulse:  [] 94  Resp:  [15-18] 17  SpO2:  [93 %-98 %] 96 %  BP: (120-183)/() 172/100     Weight: 95 kg (209 lb 7 oz)  Body mass index is 27.63 kg/m².    Intake/Output Summary (Last 24 hours) at 3/11/2020 1623  Last data filed at 3/11/2020 0500  Gross per 24 hour   Intake 1050 ml   Output 625 ml   Net 425 ml      Physical Exam   Constitutional: He is oriented to person, place, and time. He appears well-developed and  well-nourished. No distress.   Looks ill    HENT:   Head: Normocephalic and atraumatic.   Eyes: Pupils are equal, round, and reactive to light. Conjunctivae and EOM are normal.   Neck: Normal range of motion. Neck supple.   Cardiovascular: Normal rate, regular rhythm and normal heart sounds.   No murmur heard.  Pulmonary/Chest: Effort normal and breath sounds normal. No respiratory distress. He has no wheezes. He has no rales.   Abdominal: Soft. Bowel sounds are normal. He exhibits no distension. There is no tenderness.   Musculoskeletal: He exhibits no edema or deformity.   Lymphadenopathy:     He has no cervical adenopathy.   Neurological: He is alert and oriented to person, place, and time. He displays normal reflexes. No cranial nerve deficit or sensory deficit. He exhibits normal muscle tone.   Skin: Skin is warm. Capillary refill takes less than 2 seconds. He is not diaphoretic.   Psychiatric: He has a normal mood and affect. His behavior is normal.       Significant Labs:   Blood Culture:   Recent Labs   Lab 03/10/20  1057 03/10/20  1213   LABBLOO No Growth to date  No Growth to date Gram stain aer bottle: Gram positive cocci in clusters resembling Staph   Results called to and read back by: Erma Daniel RN 03/11/2020    14:54  Gram stain mary bottle: Gram positive cocci in clusters resembling Staph     CBC:   Recent Labs   Lab 03/10/20  1057 03/11/20  0340   WBC 6.95 6.25   HGB 12.7* 12.5*   HCT 40.8 38.7*    265     CMP:   Recent Labs   Lab 03/11/20  0340 03/11/20  0830 03/11/20  1156    144 145   K 4.5 3.6 3.5    110 109   CO2 24 24 27   * 198* 218*   BUN 11 8 8   CREATININE 1.0 0.9 1.0   CALCIUM 8.2* 8.3* 8.1*   PROT 7.3 7.3 7.3   ALBUMIN 2.1* 2.3* 2.2*   BILITOT 0.2 0.2 0.2   ALKPHOS 73 77 74   AST 15 15 14   ALT 7* 8* 7*   ANIONGAP 11 10 9   EGFRNONAA >60.0 >60.0 >60.0     Pathology Results  (Last 10 years)    None          Significant Imaging: I have reviewed all  pertinent imaging results/findings within the past 24 hours.      Assessment/Plan:      * Diabetic ketoacidosis without coma associated with type 2 diabetes mellitus  50 y/o with a history of poorly controlled DM2 ,diabetic neuropathy, diabatic retinopathy and diabetic foot with multiple amputation who presents to ED with chief complaint of chills associated with nausea and vomiting for one day.   He has a history of foot ulcer of left big toe who currently treated with doxycycline.    -- afebrile, vitally stable.  -- Lab, BS: 382, B-hydroxybutyrate 3.6, HCO3 22,  anion gap 18.  -- WBC: 6.9, lactate: 1.2  -- X-ray of right food shows soft tissue edema concerning for osteomyelitis  -- last Ha1c 8.4  -- wound cx 3/5 positive for staph aureus     Plan :  -- started on insuline gtt   -- started on IVF infusion 100cc/h  -- CMP q4, Phos q4   -- replete electrolyte   -- blood cx drawn, follow up   -- AG closed and BS down to 200      Diabetic ulcer of left midfoot associated with type 2 diabetes mellitus, with bone involvement without evidence of necrosis  -- Pt has a hx of poorly controled DM   -- hx of diabetic ulcer with multiple amputations   -- currently treated with doxycyline   -- wound cx positive for S. aureus      Plan :   -- started on rocephin and flagyl   -- consult podiatry, applicate their help   -- MRI with acute osteomyelitis of 4th metatarsal head and neck of left proximal phalanx  -- podiatry plan for amputation.   -- ID recommend to switch to cefazolin.       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

## 2020-03-11 NOTE — ED NOTES
Pt denies desire for food at this time. Med team aware and told to inform med team 5 when pt wants to eat.

## 2020-03-11 NOTE — ASSESSMENT & PLAN NOTE
48 y/o with a history of poorly controlled DM2 ,diabetic neuropathy, diabatic retinopathy and diabetic foot with multiple amputation who presents to ED with chief complaint of chills associated with nausea and vomiting for one day.   He has a history of foot ulcer of left big toe who currently treated with doxycycline.    -- afebrile, vitally stable.  -- Lab, BS: 382, B-hydroxybutyrate 3.6, HCO3 22,  anion gap 18.  -- WBC: 6.9, lactate: 1.2  -- X-ray of right food shows soft tissue edema concerning for osteomyelitis  -- last Ha1c 8.4  -- wound cx 3/5 positive for staph aureus     Plan :  -- started on insuline gtt   -- started on IVF infusion 100cc/h  -- CMP q4, Phos q4   -- replete electrolyte   -- blood cx drawn, follow up   -- AG closed and BS down to 200

## 2020-03-11 NOTE — PROGRESS NOTES
Chart reviewed.   Requested updates from Care Everywhere.  Immunizations reconciled.    updated.  A1c previously ordered.

## 2020-03-11 NOTE — ASSESSMENT & PLAN NOTE
Mr. Ryder is 49 y.o M well known to our podiatry service presented to [facility] ED on 3/10/2020  with chief complaint of nausea, vomiting, fever and chills  for the past 2 days and decreased appetite for past week.   He was last seen in podiatry clinic 3/5/2020 and prescribed doxycyline with no improvement in foot ulcer.    In the ED, he was afebrile, vitally stable. WBC: 6.9, lactate: 1.2. X-ray of right food shows soft tissue edema concerning for osteomyelitis. He received single dose of vanc/zosyn and 2L IVF.     Plan:   Continue IV antibiotics.  IV fluids  MRI left forefoot demonstrating acute osteomyelitis of the 4th metatarsal head and neck and proximal phalanx.  No rim enhancing fluid collection or drainable abscess.  Will discuss with patient today his MRI results and our plan for partial 4th ray amputation tomorrow 3/12/2020.   NPO past midnight

## 2020-03-11 NOTE — PT/OT/SLP PROGRESS
Physical Therapy      Patient Name:  Indio Ryder Jr.   MRN:  [id number]    Patient not seen today for PT evaluation secondary to nursing hold due to medical condition. Two attempts.  . Will follow-up next scheduled day, as medically appropriate..    Katie Smith, PT

## 2020-03-11 NOTE — NURSING
Patient transferred off the floor to travel to MRI . Spoke to [facility] staff . Permission to take opt off insulin drio for MRI procedure . MRI instructed to bring pt directly back or notify RN of delay

## 2020-03-11 NOTE — PT/OT/SLP PROGRESS
Occupational Therapy      Patient Name:  Indio Ryder Jr.   MRN:  [id number]    Patient not seen today secondary to nursing hold secondary to nausea. Two attempts made on this date one in AM and one in PM.  Will follow-up tomorrow.    NATALIE Pozo  3/11/2020

## 2020-03-11 NOTE — HOSPITAL COURSE
Patient with a history of DM2, DKA with coma, osteomyelitis, multiple amputation who present with chills, N/V and found with DKA. Started on insuline gtt and IVF infusion. Pt is currently treated with doxycyline for wound in left foot. ID and podiatry consulted. MRI with acute osteomyelitis of 4th metatarsal head and neck of left proximal phalanx. ID recommended cefazolin and podiatry plan for for amputation. Pt is refusing amputation but agree to have agressive washout if he keep spiking fever. Pt transitioned to subQ insulin and tolerating PO. Blood ux grwe Staph coagulase negative, TTE done and shows normal valves with no vegetations. Pt had drop in his SpO2 to 85 with need for 5L oxygen. Respiratory cx with many GPC and CXR shows b/l patchy infiltration. Pt still spiking fever and in acute respiratory failure. Concern for COVID-19, test is sent.

## 2020-03-11 NOTE — SUBJECTIVE & OBJECTIVE
Scheduled Meds:   atorvastatin  80 mg Oral Daily    ceFAZolin(ANCEF) in D5W 500 mL CONTINUOUS INFUSION  6 g Intravenous Q24H    gabapentin  600 mg Oral Daily    hydroxyzine HCL  50 mg Oral Once     Continuous Infusions:   custom IV infusion builder 200 mL/hr at 03/11/20 1122    insulin (HUMAN R) infusion (adults) 2.7 Units/hr (03/11/20 0435)     PRN Meds:acetaminophen, dextrose 10 % in water (D10W), dextrose 10 % in water (D10W), Dextrose 10% Bolus, Dextrose 10% Bolus, glucagon (human recombinant), glucose, glucose, hydrALAZINE, ondansetron, potassium chloride 10%, promethazine (PHENERGAN) IVPB, sodium chloride 0.9%, sodium chloride 0.9%    Review of patient's allergies indicates:  No Known Allergies     Past Medical History:   Diagnosis Date    Actinomyces infection 1/17/2017    Right foot    Diabetic ketoacidosis without coma associated with type 2 diabetes mellitus 5/30/2017    Diabetic ulcer of right foot associated with type 2 diabetes mellitus 6/3/2015    Essential hypertension 6/6/2013    Group B streptococcal infection 1/13/2017    Mixed hyperlipidemia 8/12/2014    Shoulder impingement 8/12/2014    Subacute osteomyelitis of right foot 1/12/2017    Streptococcus agalactiae, Actinomyces odontolyticus    Traumatic amputation of fifth toe of right foot 07/02/2015    Type 2 diabetes mellitus with diabetic neuropathy, with long-term current use of insulin 5/3/2016    Ulcerative colitis, unspecified      Past Surgical History:   Procedure Laterality Date    DEBRIDEMENT OF FOOT Left 7/14/2019    Procedure: DEBRIDEMENT, FOOT, with left 5th ray amputation;  Surgeon: Sis Hickman DPM;  Location: [facility] OR [address] [place] [address];  Service: Podiatry;  Laterality: Left;    DEBRIDEMENT OF FOOT Left 7/17/2019    Procedure: DEBRIDEMENT, FOOT with leftt 5th ray partial amputation with Neox Graft;  Surgeon: Mai Burrell DPM;  Location: [facility] OR [address] [place] [address];  Service: Podiatry;  Laterality: Left;  t    TOE  AMPUTATION Right 06/05/2015    TOE AMPUTATION Right 01/19/2017       Family History     Problem Relation (Age of Onset)    Cancer Mother    Cataracts Father    Diabetes Mother, Sister    Hypertension Mother, Father, Maternal Aunt    No Known Problems Brother, Sister, Sister, Maternal Uncle, Paternal Aunt, Paternal Uncle, Maternal Grandmother, Maternal Grandfather, Paternal Grandmother, Paternal Grandfather        Tobacco Use    Smoking status: Never Smoker    Smokeless tobacco: Never Used   Substance and Sexual Activity    Alcohol use: No    Drug use: No    Sexual activity: Yes     Partners: Female     Birth control/protection: Condom     Review of Systems   Constitutional: Positive for appetite change, chills, fatigue and fever.   Gastrointestinal: Positive for nausea and vomiting. Negative for diarrhea.   Musculoskeletal: Negative for arthralgias, joint swelling and myalgias.   Skin: Positive for wound.   Neurological: Positive for weakness and numbness.   Psychiatric/Behavioral: Negative for behavioral problems and confusion.     Objective:     Vital Signs (Most Recent):  Temp: 99.7 °F (37.6 °C) (03/11/20 1130)  Pulse: 93 (03/11/20 1130)  Resp: 15 (03/11/20 1130)  BP: (!) 163/91 (03/11/20 1130)  SpO2: 95 % (03/11/20 0800) Vital Signs (24h Range):  Temp:  [98 °F (36.7 °C)-102 °F (38.9 °C)] 99.7 °F (37.6 °C)  Pulse:  [] 93  Resp:  [15-20] 15  SpO2:  [93 %-98 %] 95 %  BP: (120-183)/() 163/91     Weight: 95 kg (209 lb 7 oz)  Body mass index is 27.63 kg/m².    Foot Exam    General  Orientation: alert and oriented to person, place, and time   Affect: appropriate       Right Foot/Ankle     Inspection and Palpation  Ecchymosis: none  Tenderness: none   Swelling: none   Skin Exam: skin intact;     Neurovascular  Dorsalis pedis: 1+  Posterior tibial: 1+  Saphenous nerve sensation: diminished  Tibial nerve sensation: diminished  Superficial peroneal nerve sensation: diminished  Deep peroneal nerve  sensation: diminished  Sural nerve sensation: diminished    Comments  Amputation of 1,5 digits    Left Foot/Ankle      Inspection and Palpation  Ecchymosis: none  Tenderness: none   Swelling: none   Skin Exam: drainage and ulcer;     Neurovascular  Dorsalis pedis: 1+  Posterior tibial: 1+  Saphenous nerve sensation: diminished  Tibial nerve sensation: diminished  Superficial peroneal nerve sensation: diminished  Deep peroneal nerve sensation: diminished  Sural nerve sensation: diminished    Comments  Partial 5th ray amputation      Laboratory:  A1C:   Recent Labs   Lab 12/03/19  1019   HGBA1C 8.4*     CBC:   Recent Labs   Lab 03/11/20  0340   WBC 6.25   RBC 4.30*   HGB 12.5*   HCT 38.7*      MCV 90   MCH 29.1   MCHC 32.3     CMP:   Recent Labs   Lab 03/11/20  1156   *   CALCIUM 8.1*   ALBUMIN 2.2*   PROT 7.3      K 3.5   CO2 27      BUN 8   CREATININE 1.0   ALKPHOS 74   ALT 7*   AST 14   BILITOT 0.2     CRP:   Recent Labs   Lab 03/05/20  1617   CRP 75.3*     ESR:   Recent Labs   Lab 03/05/20  1617   SEDRATE 35*     Wound Cultures:   Recent Labs   Lab 03/05/20  1611 03/05/20  1612   LABAERO STAPHYLOCOCCUS AUREUS  Many  Skin janes also present  * STAPHYLOCOCCUS AUREUS  Moderate  Skin janes also present  *     Microbiology Results (last 7 days)     Procedure Component Value Units Date/Time    Blood culture [[id number]] Collected:  03/11/20 1156    Order Status:  Sent Specimen:  Blood Updated:  03/11/20 1201    Blood culture [[id number]] Collected:  03/10/20 1213    Order Status:  Completed Specimen:  Blood from Peripheral, Wrist, Right Updated:  03/11/20 0115     Blood Culture, Routine No Growth to date    Blood culture [[id number]] Collected:  03/10/20 1057    Order Status:  Completed Specimen:  Blood from Peripheral, Antecubital, Left Updated:  03/11/20 0115     Blood Culture, Routine No Growth to date        Specimen (12h ago, onward)    None          Diagnostic Results:  I have reviewed all  pertinent imaging results/findings within the past 24 hours.   Imaging Results          X-Ray Foot Complete Left (Final result)  Result time 03/10/20 11:22:03    Final result by Vinnie Cortés III, MD (03/10/20 11:22:03)                 Narrative:    EXAMINATION:  XR FOOT COMPLETE 3 VIEW LEFT    CLINICAL HISTORY:  Non-pressure chronic ulcer of other part of left foot with unspecified severity    FINDINGS:  No fracture dislocation seen.  There is amputation of the 5th digit and partial amputation of the distal 5th metatarsal.  There is soft tissue swelling.  Osteomyelitis of the distal aspect of the remaining 5th metatarsal could not be excluded.  MRI could be helpful.      Electronically signed by: Vinnie Cortés MD  Date:    03/10/2020  Time:    11:22                                Clinical Findings:  Left foot ulcer  Location: Plantar 3rd/4th metatarsal head  Wound base: 20% granular; 80% fibrotic.  Periwound: viable  Drainage: purulent  Signs of infection: +probes to bone, malodorous, purulent drainage.  Wound tracks at 9 o clock to 3rd metatarsal head and distally at 12 o clock to 4th toe. No erythema, no edema.

## 2020-03-11 NOTE — HPI
Mr. Ryder is 49 y.o M well known to our podiatry service with PMHx of poorly controlled DM2 ( last Ha1c 8.4), DKA with coma, diabetic neuropathy, diabatic retinopathy and multiple diabetic foot ulcers with subsequent amputations who [place] [facility] [place]  with chief complaint of nausea, vomiting, fever and chills for the past 2 days. He also reports poor appetite with decreased oral intake x 1 week.     He has been seeing podiatry on and off for management of multiple diabetic ulcers since 2015. He was last seen in podiatry clinic 3/5/2020 by Dr. Gage Velazquez in which left foot ulcer was debrided, wound and bone cultures were obtained and bone biopsy sent to pathology. Left foot was offloaded in football dressing with instructions to keep dressings dry and intact and to follow up in 1 week. He was prescribed a 10 day supply of doxycyline which he has been compliant with. His most recent amputation was done 7/14/2019 for a partial 5th ray amputation. Denies any pedal pain. He has been full weightbearing in his own personal shoes.    In the ED, he was afebrile, vitally stable. WBC: 6.9, lactate: 1.2. X-ray of right food shows soft tissue edema concerning for osteomyelitis. He received single dose of vanc/zosyn and 2L IVF.

## 2020-03-11 NOTE — ED NOTES
Telemetry Verification   Patient placed on Telemetry Box  Verified with War Room  Box # [id number]   Monitor Tech heather   Rate 88   Rhythm NSR

## 2020-03-11 NOTE — ED NOTES
Report received from KINZA Wayne. Assume care of pt. Pt resting comfortably in stretcher with bed locked in lowest position for safety. NAD noted at this time. Respirations even and unlabored and visible chest rise noted.  Patient with urinal at bedside, denies need to urinate at this time. Pt instructed to call if assistance is needed. Pt on continuous cardiac, BP, and O2 monitoring. Call light within reach. No needs at this time. Will continue to monitor.

## 2020-03-11 NOTE — SUBJECTIVE & OBJECTIVE
Past Medical History:   Diagnosis Date    Actinomyces infection 1/17/2017    Right foot    Diabetic ketoacidosis without coma associated with type 2 diabetes mellitus 5/30/2017    Diabetic ulcer of right foot associated with type 2 diabetes mellitus 6/3/2015    Essential hypertension 6/6/2013    Group B streptococcal infection 1/13/2017    Mixed hyperlipidemia 8/12/2014    Shoulder impingement 8/12/2014    Subacute osteomyelitis of right foot 1/12/2017    Streptococcus agalactiae, Actinomyces odontolyticus    Traumatic amputation of fifth toe of right foot 07/02/2015    Type 2 diabetes mellitus with diabetic neuropathy, with long-term current use of insulin 5/3/2016    Ulcerative colitis, unspecified        Past Surgical History:   Procedure Laterality Date    DEBRIDEMENT OF FOOT Left 7/14/2019    Procedure: DEBRIDEMENT, FOOT, with left 5th ray amputation;  Surgeon: Sis Hickman DPM;  Location: [facility] OR [address] [place] [address];  Service: Podiatry;  Laterality: Left;    DEBRIDEMENT OF FOOT Left 7/17/2019    Procedure: DEBRIDEMENT, FOOT with leftt 5th ray partial amputation with Neox Graft;  Surgeon: Mai Burrell DPM;  Location: [facility] OR [address] [place] [address];  Service: Podiatry;  Laterality: Left;  t    TOE AMPUTATION Right 06/05/2015    TOE AMPUTATION Right 01/19/2017       Review of patient's allergies indicates:  No Known Allergies    Medications:  Medications Prior to Admission   Medication Sig    atorvastatin (LIPITOR) 80 MG tablet Take 1 tablet (80 mg total) by mouth once daily.    doxycycline (MONODOX) 100 MG capsule Take 1 capsule (100 mg total) by mouth every 12 (twelve) hours.    gabapentin (NEURONTIN) 600 MG tablet Take 2 tablets (1,200 mg total) by mouth 2 (two) times daily.    insulin aspart U-100 (NOVOLOG) 100 unit/mL (3 mL) InPn pen Inject 10 units w/ meals plus scale 150-200+2, 201-250+4, 251-300+6, 301-350+8, >350+10. Snack dose 5 units.  Light meal cut back to 5 units. Max daily 60 units.     "insulin detemir U-100 (LEVEMIR FLEXTOUCH) 100 unit/mL (3 mL) SubQ InPn pen Inject 25 Units into the skin every evening.    liraglutide 0.6 mg/0.1 mL, 18 mg/3 mL, subq PNIJ (VICTOZA 2-JOYCE) 0.6 mg/0.1 mL (18 mg/3 mL) PnIj Inject 0.6 mg into the skin daily week 1 and 1.2 mg daily week 2 and on.    losartan (COZAAR) 25 MG tablet Take 0.5 tablets (12.5 mg total) by mouth once daily. Holds if SBP is less than 120.    metFORMIN (GLUCOPHAGE) 500 MG tablet Take 2 tablets by mouth in the morning, and take 2 tablets by mouth at night.    ONETOUCH DELICA LANCETS 33 gauge Misc     ONETOUCH ULTRA2 kit     pen needle, diabetic 31 gauge x 5/16" Ndle Use to inject insulin four times daily     Antibiotics (From admission, onward)    Start     Stop Route Frequency Ordered    03/11/20 1500  ceFAZolin (ANCEF) 6 g in dextrose 5 % 500 mL CONTINUOUS INFUSION      -- IV Every 24 hours (non-standard times) 03/11/20 1357        Antifungals (From admission, onward)    None        Antivirals (From admission, onward)    None           Immunization History   Administered Date(s) Administered    Pneumococcal Conjugate - 13 Valent 06/09/2017    Pneumococcal Polysaccharide - 23 Valent 08/12/2014    Tdap 06/09/2017       Family History     Problem Relation (Age of Onset)    Cancer Mother    Cataracts Father    Diabetes Mother, Sister    Hypertension Mother, Father, Maternal Aunt    No Known Problems Brother, Sister, Sister, Maternal Uncle, Paternal Aunt, Paternal Uncle, Maternal Grandmother, Maternal Grandfather, Paternal Grandmother, Paternal Grandfather        Social History     Socioeconomic History    Marital status: Single     Spouse name: Not on file    Number of children: 0    Years of education: Not on file    Highest education level: Not on file   Occupational History     Employer: [employer]   Social Needs    Financial resource strain: Not on file    Food insecurity:     Worry: Not on file     Inability: Not on file    " Transportation needs:     Medical: Not on file     Non-medical: Not on file   Tobacco Use    Smoking status: Never Smoker    Smokeless tobacco: Never Used   Substance and Sexual Activity    Alcohol use: No    Drug use: No    Sexual activity: Yes     Partners: Female     Birth control/protection: Condom   Lifestyle    Physical activity:     Days per week: Not on file     Minutes per session: Not on file    Stress: Not on file   Relationships    Social connections:     Talks on phone: Not on file     Gets together: Not on file     Attends Denominational service: Not on file     Active member of club or organization: Not on file     Attends meetings of clubs or organizations: Not on file     Relationship status: Not on file   Other Topics Concern    Not on file   Social History Narrative    Not on file     Review of Systems   Constitutional: Positive for activity change, appetite change, fatigue and fever. Negative for chills and diaphoresis.   Respiratory: Negative for cough and shortness of breath.    Cardiovascular: Positive for leg swelling.   Gastrointestinal: Positive for nausea. Negative for abdominal pain, constipation, diarrhea and vomiting.   Genitourinary: Negative for dysuria.   Musculoskeletal: Negative for back pain.   Skin: Positive for wound. Negative for color change, pallor and rash.   Neurological: Negative for dizziness, light-headedness and headaches.   All other systems reviewed and are negative.    Objective:     Vital Signs (Most Recent):  Temp: 99.7 °F (37.6 °C) (03/11/20 1130)  Pulse: 93 (03/11/20 1130)  Resp: 15 (03/11/20 1130)  BP: (!) 163/91 (03/11/20 1130)  SpO2: 95 % (03/11/20 0800) Vital Signs (24h Range):  Temp:  [98 °F (36.7 °C)-102 °F (38.9 °C)] 99.7 °F (37.6 °C)  Pulse:  [] 93  Resp:  [15-20] 15  SpO2:  [93 %-98 %] 95 %  BP: (120-183)/() 163/91     Weight: 95 kg (209 lb 7 oz)  Body mass index is 27.63 kg/m².    Estimated Creatinine Clearance: 98.8 mL/min (based on  SCr of 1 mg/dL).    Physical Exam   Constitutional: He appears well-developed and well-nourished. No distress.   HENT:   Head: Normocephalic.   Eyes: Pupils are equal, round, and reactive to light.   Cardiovascular: Normal rate, regular rhythm, normal heart sounds and intact distal pulses.   No murmur heard.  Pulmonary/Chest: Effort normal. No stridor. No respiratory distress. He has no wheezes.   Abdominal: Soft. He exhibits no distension. There is no tenderness.   Musculoskeletal: Normal range of motion. He exhibits edema. He exhibits no tenderness or deformity.   Neurological: He is alert.   Skin: Skin is warm and dry. He is not diaphoretic. There is erythema.   Psychiatric: He has a normal mood and affect.   Vitals reviewed.          Significant Labs: All pertinent labs within the past 24 hours have been reviewed.    Significant Imaging: I have reviewed all pertinent imaging results/findings within the past 24 hours.

## 2020-03-11 NOTE — PLAN OF CARE
Reviewed plan of care with pt at the beginning of the shift.  Pt complains of foot ulcer and wound care consult put in for the pt. Pt reported no pain throughout the shift. Pt reported anxiety. PRN ordered but not able to give to pt because of n/v. Will continue to reassess. Pt reported n/v throughout the shift. PRN given. Vital signs were stable throughout the shift. BP elevated. PRN ordered for > . Fall precautions continued for pt. Bed locked and at lowest position. Call light within reach. Pt knows to call if assistance is needed.

## 2020-03-11 NOTE — CONSULTS
[facility]  Infectious Disease  Consult Note    Patient Name: Indio Ryder Jr.  MRN: [id number]  Admission Date: 3/10/2020  Hospital Length of Stay: 1 days  Attending Physician: Antonio Valladares MD  Primary Care Provider: Lorena Thakur MD     Isolation Status: No active isolations    Inpatient consult to Infectious Diseases  Consult performed by: Florence Doe PA-C  Consult ordered by: Maria Elena Cline MD        Assessment/Plan:     Bacteremia  Plan as above  Continue current abx therapy    Osteomyelitis of left foot  48 y/o with a history of poorly controlled DM2 ( last Ha1c 8.4 ), diabetic neuropathy, diabatic retinopathy and diabetic foot ulcer of left foot with multiple amputation who presents to ED with chief complaint of fever, chills and nausea and worsening wounds of his left foot. His blood cultures on admit are positive with GPCs. He was seen by podiatry 3/5/2020. Bone and wound cultures +MSSA. He was given course of doxycycline.     He is currently on vancomycin and zosyn. MRI studies confirms acute osteomyelitis of the 4th metatarsal head and neck and proximal phalanx.  No rim enhancing fluid collection or drainable abscess.  ALISSA studies wnl      Recommendations  1. Continue cefazolin 6g continuous infusion. Discontinued vancomycin and zosyn as prior cultures +MSSA.   2. Podiatry recommending 4th digit amputation tomorrow. Please send clean margin bone for cultures and pathology  3. Blood cultures on admit +GPCs. Likely MSSA. Continue cefazolin. Received two doses of vancomycin.  Repeat blood cultures are pending. If clinically decompensate with add vancomycin.             Thank you for your consult. I will follow-up with patient. Please contact us if you have any additional questions.    Florence Doe PA-C  Infectious Disease  [facility]    Subjective:     Principal Problem: Diabetic ketoacidosis without coma associated with type 2 diabetes  mellitus    HPI: Mr. Ryder is 48 y/o with a history of poorly controlled DM2 ( last Ha1c 8.4 ) , DKA with coma, diabetic neuropathy, diabatic retinopathy and diabetic foot with multiple amputation who presents to ED with chief complaint of chills associated with nausea and vomiting for one day. He sates that yesterday he started to feel ill and he has been having chills associated with nauseas and vomiting. He also endorses poor appetite with decreased oral intake for the last week. Denies fever, abdominal pain, feet or leg pain, leg swelling, CP, SOB, dysuria. He a history of poorly control diabetes with amputation of his right big and 5th toes. He undergone left big toe detriment and imputation on 7/14/2019. He reports not feeling well for the last couple of weeks and he felt about to pass out on Mardi gras and his BP was in 90/50. He was seen by podiatry last  Thursday when he had his wound opened and he was prescribed doxycycline for 10 days.    Bone cultures +MSSA on 3/5/2020. He is currently on vancomycin and zosyn.     MRI studies confirms acute osteomyelitis of the 4th metatarsal head and neck and proximal phalanx.  No rim enhancing fluid collection or drainable abscess.  ALISSA studies wnl  Blood cultures on admit are positive with GPCs      Past Medical History:   Diagnosis Date    Actinomyces infection 1/17/2017    Right foot    Diabetic ketoacidosis without coma associated with type 2 diabetes mellitus 5/30/2017    Diabetic ulcer of right foot associated with type 2 diabetes mellitus 6/3/2015    Essential hypertension 6/6/2013    Group B streptococcal infection 1/13/2017    Mixed hyperlipidemia 8/12/2014    Shoulder impingement 8/12/2014    Subacute osteomyelitis of right foot 1/12/2017    Streptococcus agalactiae, Actinomyces odontolyticus    Traumatic amputation of fifth toe of right foot 07/02/2015    Type 2 diabetes mellitus with diabetic neuropathy, with long-term current use of insulin  "5/3/2016    Ulcerative colitis, unspecified        Past Surgical History:   Procedure Laterality Date    DEBRIDEMENT OF FOOT Left 7/14/2019    Procedure: DEBRIDEMENT, FOOT, with left 5th ray amputation;  Surgeon: Sis Hickman DPM;  Location: [facility] OR [address] [place] [address];  Service: Podiatry;  Laterality: Left;    DEBRIDEMENT OF FOOT Left 7/17/2019    Procedure: DEBRIDEMENT, FOOT with leftt 5th ray partial amputation with Neox Graft;  Surgeon: Mai Burrell DPM;  Location: [facility] OR [address] [place] [address];  Service: Podiatry;  Laterality: Left;  t    TOE AMPUTATION Right 06/05/2015    TOE AMPUTATION Right 01/19/2017       Review of patient's allergies indicates:  No Known Allergies    Medications:  Medications Prior to Admission   Medication Sig    atorvastatin (LIPITOR) 80 MG tablet Take 1 tablet (80 mg total) by mouth once daily.    doxycycline (MONODOX) 100 MG capsule Take 1 capsule (100 mg total) by mouth every 12 (twelve) hours.    gabapentin (NEURONTIN) 600 MG tablet Take 2 tablets (1,200 mg total) by mouth 2 (two) times daily.    insulin aspart U-100 (NOVOLOG) 100 unit/mL (3 mL) InPn pen Inject 10 units w/ meals plus scale 150-200+2, 201-250+4, 251-300+6, 301-350+8, >350+10. Snack dose 5 units.  Light meal cut back to 5 units. Max daily 60 units.    insulin detemir U-100 (LEVEMIR FLEXTOUCH) 100 unit/mL (3 mL) SubQ InPn pen Inject 25 Units into the skin every evening.    liraglutide 0.6 mg/0.1 mL, 18 mg/3 mL, subq PNIJ (VICTOZA 2-JOYCE) 0.6 mg/0.1 mL (18 mg/3 mL) PnIj Inject 0.6 mg into the skin daily week 1 and 1.2 mg daily week 2 and on.    losartan (COZAAR) 25 MG tablet Take 0.5 tablets (12.5 mg total) by mouth once daily. Holds if SBP is less than 120.    metFORMIN (GLUCOPHAGE) 500 MG tablet Take 2 tablets by mouth in the morning, and take 2 tablets by mouth at night.    ONETOUCH DELICA LANCETS 33 gauge Misc     ONETOUCH ULTRA2 kit     pen needle, diabetic 31 gauge x 5/16" Ndle Use to inject insulin four times " daily     Antibiotics (From admission, onward)    Start     Stop Route Frequency Ordered    03/11/20 1500  ceFAZolin (ANCEF) 6 g in dextrose 5 % 500 mL CONTINUOUS INFUSION      -- IV Every 24 hours (non-standard times) 03/11/20 1357        Antifungals (From admission, onward)    None        Antivirals (From admission, onward)    None           Immunization History   Administered Date(s) Administered    Pneumococcal Conjugate - 13 Valent 06/09/2017    Pneumococcal Polysaccharide - 23 Valent 08/12/2014    Tdap 06/09/2017       Family History     Problem Relation (Age of Onset)    Cancer Mother    Cataracts Father    Diabetes Mother, Sister    Hypertension Mother, Father, Maternal Aunt    No Known Problems Brother, Sister, Sister, Maternal Uncle, Paternal Aunt, Paternal Uncle, Maternal Grandmother, Maternal Grandfather, Paternal Grandmother, Paternal Grandfather        Social History     Socioeconomic History    Marital status: Single     Spouse name: Not on file    Number of children: 0    Years of education: Not on file    Highest education level: Not on file   Occupational History     Employer: [employer]   Social Needs    Financial resource strain: Not on file    Food insecurity:     Worry: Not on file     Inability: Not on file    Transportation needs:     Medical: Not on file     Non-medical: Not on file   Tobacco Use    Smoking status: Never Smoker    Smokeless tobacco: Never Used   Substance and Sexual Activity    Alcohol use: No    Drug use: No    Sexual activity: Yes     Partners: Female     Birth control/protection: Condom   Lifestyle    Physical activity:     Days per week: Not on file     Minutes per session: Not on file    Stress: Not on file   Relationships    Social connections:     Talks on phone: Not on file     Gets together: Not on file     Attends Temple service: Not on file     Active member of club or organization: Not on file     Attends meetings of clubs or  organizations: Not on file     Relationship status: Not on file   Other Topics Concern    Not on file   Social History Narrative    Not on file     Review of Systems   Constitutional: Positive for activity change, appetite change, fatigue and fever. Negative for chills and diaphoresis.   Respiratory: Negative for cough and shortness of breath.    Cardiovascular: Positive for leg swelling.   Gastrointestinal: Positive for nausea. Negative for abdominal pain, constipation, diarrhea and vomiting.   Genitourinary: Negative for dysuria.   Musculoskeletal: Negative for back pain.   Skin: Positive for wound. Negative for color change, pallor and rash.   Neurological: Negative for dizziness, light-headedness and headaches.   All other systems reviewed and are negative.    Objective:     Vital Signs (Most Recent):  Temp: 99.7 °F (37.6 °C) (03/11/20 1130)  Pulse: 93 (03/11/20 1130)  Resp: 15 (03/11/20 1130)  BP: (!) 163/91 (03/11/20 1130)  SpO2: 95 % (03/11/20 0800) Vital Signs (24h Range):  Temp:  [98 °F (36.7 °C)-102 °F (38.9 °C)] 99.7 °F (37.6 °C)  Pulse:  [] 93  Resp:  [15-20] 15  SpO2:  [93 %-98 %] 95 %  BP: (120-183)/() 163/91     Weight: 95 kg (209 lb 7 oz)  Body mass index is 27.63 kg/m².    Estimated Creatinine Clearance: 98.8 mL/min (based on SCr of 1 mg/dL).    Physical Exam   Constitutional: He appears well-developed and well-nourished. No distress.   HENT:   Head: Normocephalic.   Eyes: Pupils are equal, round, and reactive to light.   Cardiovascular: Normal rate, regular rhythm, normal heart sounds and intact distal pulses.   No murmur heard.  Pulmonary/Chest: Effort normal. No stridor. No respiratory distress. He has no wheezes.   Abdominal: Soft. He exhibits no distension. There is no tenderness.   Musculoskeletal: Normal range of motion. He exhibits edema. He exhibits no tenderness or deformity.   Neurological: He is alert.   Skin: Skin is warm and dry. He is not diaphoretic. There is erythema.    Psychiatric: He has a normal mood and affect.   Vitals reviewed.          Significant Labs: All pertinent labs within the past 24 hours have been reviewed.    Significant Imaging: I have reviewed all pertinent imaging results/findings within the past 24 hours.

## 2020-03-11 NOTE — PROGRESS NOTES
Therapy with vanc complete and/or consult discontinued by provider.  Pharmacy will sign off, please re-consult as needed.    Thanks for the consult  El Zhao PharmD, [facility]  Clinical Pharmacist  Spectra Link: [phone]

## 2020-03-12 ENCOUNTER — PATIENT MESSAGE (OUTPATIENT)
Dept: DIABETES | Facility: CLINIC | Age: 50
End: 2020-03-12

## 2020-03-12 LAB
ALBUMIN SERPL BCP-MCNC: 2 G/DL (ref 3.5–5.2)
ALBUMIN SERPL BCP-MCNC: 2.1 G/DL (ref 3.5–5.2)
ALBUMIN SERPL BCP-MCNC: 2.2 G/DL (ref 3.5–5.2)
ALBUMIN SERPL BCP-MCNC: 2.2 G/DL (ref 3.5–5.2)
ALP SERPL-CCNC: 65 U/L (ref 55–135)
ALP SERPL-CCNC: 67 U/L (ref 55–135)
ALP SERPL-CCNC: 68 U/L (ref 55–135)
ALP SERPL-CCNC: 70 U/L (ref 55–135)
ALT SERPL W/O P-5'-P-CCNC: 6 U/L (ref 10–44)
ALT SERPL W/O P-5'-P-CCNC: 7 U/L (ref 10–44)
ANION GAP SERPL CALC-SCNC: 11 MMOL/L (ref 8–16)
ANION GAP SERPL CALC-SCNC: 6 MMOL/L (ref 8–16)
ANION GAP SERPL CALC-SCNC: 8 MMOL/L (ref 8–16)
ANION GAP SERPL CALC-SCNC: 9 MMOL/L (ref 8–16)
AST SERPL-CCNC: 13 U/L (ref 10–40)
AST SERPL-CCNC: 13 U/L (ref 10–40)
AST SERPL-CCNC: 14 U/L (ref 10–40)
AST SERPL-CCNC: 14 U/L (ref 10–40)
AST SERPL-CCNC: 16 U/L (ref 10–40)
AST SERPL-CCNC: 16 U/L (ref 10–40)
BASOPHILS # BLD AUTO: 0 K/UL (ref 0–0.2)
BASOPHILS NFR BLD: 0 % (ref 0–1.9)
BILIRUB SERPL-MCNC: 0.2 MG/DL (ref 0.1–1)
BILIRUB SERPL-MCNC: 0.3 MG/DL (ref 0.1–1)
BUN SERPL-MCNC: 3 MG/DL (ref 6–20)
BUN SERPL-MCNC: 4 MG/DL (ref 6–20)
BUN SERPL-MCNC: 5 MG/DL (ref 6–20)
CALCIUM SERPL-MCNC: 7.8 MG/DL (ref 8.7–10.5)
CALCIUM SERPL-MCNC: 7.9 MG/DL (ref 8.7–10.5)
CALCIUM SERPL-MCNC: 8 MG/DL (ref 8.7–10.5)
CALCIUM SERPL-MCNC: 8 MG/DL (ref 8.7–10.5)
CALCIUM SERPL-MCNC: 8.1 MG/DL (ref 8.7–10.5)
CALCIUM SERPL-MCNC: 8.1 MG/DL (ref 8.7–10.5)
CHLORIDE SERPL-SCNC: 103 MMOL/L (ref 95–110)
CHLORIDE SERPL-SCNC: 106 MMOL/L (ref 95–110)
CHLORIDE SERPL-SCNC: 106 MMOL/L (ref 95–110)
CHLORIDE SERPL-SCNC: 108 MMOL/L (ref 95–110)
CHLORIDE SERPL-SCNC: 108 MMOL/L (ref 95–110)
CHLORIDE SERPL-SCNC: 109 MMOL/L (ref 95–110)
CO2 SERPL-SCNC: 24 MMOL/L (ref 23–29)
CO2 SERPL-SCNC: 25 MMOL/L (ref 23–29)
CO2 SERPL-SCNC: 27 MMOL/L (ref 23–29)
CO2 SERPL-SCNC: 27 MMOL/L (ref 23–29)
CO2 SERPL-SCNC: 29 MMOL/L (ref 23–29)
CO2 SERPL-SCNC: 29 MMOL/L (ref 23–29)
CREAT SERPL-MCNC: 0.9 MG/DL (ref 0.5–1.4)
CREAT SERPL-MCNC: 1 MG/DL (ref 0.5–1.4)
CREAT SERPL-MCNC: 1 MG/DL (ref 0.5–1.4)
CREAT SERPL-MCNC: 1.1 MG/DL (ref 0.5–1.4)
DIFFERENTIAL METHOD: ABNORMAL
EOSINOPHIL # BLD AUTO: 0 K/UL (ref 0–0.5)
EOSINOPHIL NFR BLD: 0 % (ref 0–8)
ERYTHROCYTE [DISTWIDTH] IN BLOOD BY AUTOMATED COUNT: 12.5 % (ref 11.5–14.5)
EST. GFR  (AFRICAN AMERICAN): >60 ML/MIN/1.73 M^2
EST. GFR  (NON AFRICAN AMERICAN): >60 ML/MIN/1.73 M^2
ESTIMATED AVG GLUCOSE: 298 MG/DL (ref 68–131)
GLUCOSE SERPL-MCNC: 162 MG/DL (ref 70–110)
GLUCOSE SERPL-MCNC: 163 MG/DL (ref 70–110)
GLUCOSE SERPL-MCNC: 196 MG/DL (ref 70–110)
GLUCOSE SERPL-MCNC: 205 MG/DL (ref 70–110)
GLUCOSE SERPL-MCNC: 206 MG/DL (ref 70–110)
GLUCOSE SERPL-MCNC: 326 MG/DL (ref 70–110)
HBA1C MFR BLD HPLC: 12 % (ref 4–5.6)
HCT VFR BLD AUTO: 35.3 % (ref 40–54)
HGB BLD-MCNC: 10.6 G/DL (ref 14–18)
IMM GRANULOCYTES # BLD AUTO: 0.03 K/UL (ref 0–0.04)
IMM GRANULOCYTES NFR BLD AUTO: 0.7 % (ref 0–0.5)
LIPASE SERPL-CCNC: 14 U/L (ref 4–60)
LYMPHOCYTES # BLD AUTO: 1 K/UL (ref 1–4.8)
LYMPHOCYTES NFR BLD: 21.4 % (ref 18–48)
MAGNESIUM SERPL-MCNC: 1.5 MG/DL (ref 1.6–2.6)
MCH RBC QN AUTO: 28.6 PG (ref 27–31)
MCHC RBC AUTO-ENTMCNC: 30 G/DL (ref 32–36)
MCV RBC AUTO: 95 FL (ref 82–98)
MONOCYTES # BLD AUTO: 0.4 K/UL (ref 0.3–1)
MONOCYTES NFR BLD: 8.5 % (ref 4–15)
NEUTROPHILS # BLD AUTO: 3.2 K/UL (ref 1.8–7.7)
NEUTROPHILS NFR BLD: 69.4 % (ref 38–73)
NRBC BLD-RTO: 0 /100 WBC
PHOSPHATE SERPL-MCNC: 2.1 MG/DL (ref 2.7–4.5)
PHOSPHATE SERPL-MCNC: 2.2 MG/DL (ref 2.7–4.5)
PHOSPHATE SERPL-MCNC: 2.3 MG/DL (ref 2.7–4.5)
PHOSPHATE SERPL-MCNC: 2.7 MG/DL (ref 2.7–4.5)
PLATELET # BLD AUTO: 235 K/UL (ref 150–350)
PMV BLD AUTO: 11.1 FL (ref 9.2–12.9)
POCT GLUCOSE: 159 MG/DL (ref 70–110)
POCT GLUCOSE: 161 MG/DL (ref 70–110)
POCT GLUCOSE: 164 MG/DL (ref 70–110)
POCT GLUCOSE: 164 MG/DL (ref 70–110)
POCT GLUCOSE: 165 MG/DL (ref 70–110)
POCT GLUCOSE: 166 MG/DL (ref 70–110)
POCT GLUCOSE: 169 MG/DL (ref 70–110)
POCT GLUCOSE: 174 MG/DL (ref 70–110)
POCT GLUCOSE: 181 MG/DL (ref 70–110)
POCT GLUCOSE: 186 MG/DL (ref 70–110)
POCT GLUCOSE: 188 MG/DL (ref 70–110)
POCT GLUCOSE: 190 MG/DL (ref 70–110)
POCT GLUCOSE: 198 MG/DL (ref 70–110)
POCT GLUCOSE: 200 MG/DL (ref 70–110)
POCT GLUCOSE: 206 MG/DL (ref 70–110)
POCT GLUCOSE: 207 MG/DL (ref 70–110)
POCT GLUCOSE: 209 MG/DL (ref 70–110)
POCT GLUCOSE: 210 MG/DL (ref 70–110)
POCT GLUCOSE: 212 MG/DL (ref 70–110)
POCT GLUCOSE: 215 MG/DL (ref 70–110)
POCT GLUCOSE: 217 MG/DL (ref 70–110)
POCT GLUCOSE: 220 MG/DL (ref 70–110)
POCT GLUCOSE: 223 MG/DL (ref 70–110)
POCT GLUCOSE: 224 MG/DL (ref 70–110)
POTASSIUM SERPL-SCNC: 3.2 MMOL/L (ref 3.5–5.1)
POTASSIUM SERPL-SCNC: 3.2 MMOL/L (ref 3.5–5.1)
POTASSIUM SERPL-SCNC: 3.4 MMOL/L (ref 3.5–5.1)
POTASSIUM SERPL-SCNC: 4.4 MMOL/L (ref 3.5–5.1)
PROT SERPL-MCNC: 6.8 G/DL (ref 6–8.4)
PROT SERPL-MCNC: 7 G/DL (ref 6–8.4)
PROT SERPL-MCNC: 7.1 G/DL (ref 6–8.4)
PROT SERPL-MCNC: 7.1 G/DL (ref 6–8.4)
PROT SERPL-MCNC: 7.3 G/DL (ref 6–8.4)
PROT SERPL-MCNC: 7.3 G/DL (ref 6–8.4)
RBC # BLD AUTO: 3.7 M/UL (ref 4.6–6.2)
SODIUM SERPL-SCNC: 138 MMOL/L (ref 136–145)
SODIUM SERPL-SCNC: 141 MMOL/L (ref 136–145)
SODIUM SERPL-SCNC: 143 MMOL/L (ref 136–145)
SODIUM SERPL-SCNC: 143 MMOL/L (ref 136–145)
SODIUM SERPL-SCNC: 144 MMOL/L (ref 136–145)
SODIUM SERPL-SCNC: 144 MMOL/L (ref 136–145)
WBC # BLD AUTO: 4.58 K/UL (ref 3.9–12.7)

## 2020-03-12 PROCEDURE — 63600175 PHARM REV CODE 636 W HCPCS: Performed by: STUDENT IN AN ORGANIZED HEALTH CARE EDUCATION/TRAINING PROGRAM

## 2020-03-12 PROCEDURE — 20600001 HC STEP DOWN PRIVATE ROOM

## 2020-03-12 PROCEDURE — 87070 CULTURE OTHR SPECIMN AEROBIC: CPT

## 2020-03-12 PROCEDURE — 25000003 PHARM REV CODE 250: Performed by: STUDENT IN AN ORGANIZED HEALTH CARE EDUCATION/TRAINING PROGRAM

## 2020-03-12 PROCEDURE — 80053 COMPREHEN METABOLIC PANEL: CPT | Mod: 91

## 2020-03-12 PROCEDURE — 84100 ASSAY OF PHOSPHORUS: CPT | Mod: 91

## 2020-03-12 PROCEDURE — 87077 CULTURE AEROBIC IDENTIFY: CPT

## 2020-03-12 PROCEDURE — 97530 THERAPEUTIC ACTIVITIES: CPT

## 2020-03-12 PROCEDURE — 99233 PR SUBSEQUENT HOSPITAL CARE,LEVL III: ICD-10-PCS | Mod: ,,, | Performed by: PHYSICIAN ASSISTANT

## 2020-03-12 PROCEDURE — 85025 COMPLETE CBC W/AUTO DIFF WBC: CPT

## 2020-03-12 PROCEDURE — 36415 COLL VENOUS BLD VENIPUNCTURE: CPT

## 2020-03-12 PROCEDURE — 99233 SBSQ HOSP IP/OBS HIGH 50: CPT | Mod: ,,, | Performed by: STUDENT IN AN ORGANIZED HEALTH CARE EDUCATION/TRAINING PROGRAM

## 2020-03-12 PROCEDURE — 97165 OT EVAL LOW COMPLEX 30 MIN: CPT

## 2020-03-12 PROCEDURE — 87186 SC STD MICRODIL/AGAR DIL: CPT

## 2020-03-12 PROCEDURE — 87040 BLOOD CULTURE FOR BACTERIA: CPT

## 2020-03-12 PROCEDURE — 83735 ASSAY OF MAGNESIUM: CPT

## 2020-03-12 PROCEDURE — 99233 SBSQ HOSP IP/OBS HIGH 50: CPT | Mod: ,,, | Performed by: PHYSICIAN ASSISTANT

## 2020-03-12 PROCEDURE — 87075 CULTR BACTERIA EXCEPT BLOOD: CPT

## 2020-03-12 PROCEDURE — 83690 ASSAY OF LIPASE: CPT

## 2020-03-12 PROCEDURE — 99233 PR SUBSEQUENT HOSPITAL CARE,LEVL III: ICD-10-PCS | Mod: ,,, | Performed by: STUDENT IN AN ORGANIZED HEALTH CARE EDUCATION/TRAINING PROGRAM

## 2020-03-12 PROCEDURE — 83036 HEMOGLOBIN GLYCOSYLATED A1C: CPT

## 2020-03-12 PROCEDURE — 63600175 PHARM REV CODE 636 W HCPCS: Performed by: PHYSICIAN ASSISTANT

## 2020-03-12 PROCEDURE — 97161 PT EVAL LOW COMPLEX 20 MIN: CPT

## 2020-03-12 RX ORDER — ONDANSETRON 2 MG/ML
4 INJECTION INTRAMUSCULAR; INTRAVENOUS EVERY 8 HOURS
Status: DISCONTINUED | OUTPATIENT
Start: 2020-03-12 | End: 2020-03-16

## 2020-03-12 RX ORDER — DEXTROSE MONOHYDRATE, SODIUM CHLORIDE, AND POTASSIUM CHLORIDE 50; 1.49; 4.5 G/1000ML; G/1000ML; G/1000ML
INJECTION, SOLUTION INTRAVENOUS CONTINUOUS
Status: DISCONTINUED | OUTPATIENT
Start: 2020-03-12 | End: 2020-03-13

## 2020-03-12 RX ORDER — HEPARIN SODIUM 5000 [USP'U]/ML
5000 INJECTION, SOLUTION INTRAVENOUS; SUBCUTANEOUS EVERY 12 HOURS
Status: DISCONTINUED | OUTPATIENT
Start: 2020-03-12 | End: 2020-03-16

## 2020-03-12 RX ORDER — POTASSIUM CHLORIDE 750 MG/1
30 CAPSULE, EXTENDED RELEASE ORAL
Status: DISCONTINUED | OUTPATIENT
Start: 2020-03-12 | End: 2020-03-12

## 2020-03-12 RX ORDER — LORAZEPAM/0.9% SODIUM CHLORIDE 100MG/0.1L
2 PLASTIC BAG, INJECTION (ML) INTRAVENOUS ONCE
Status: COMPLETED | OUTPATIENT
Start: 2020-03-12 | End: 2020-03-12

## 2020-03-12 RX ORDER — ACETAMINOPHEN 325 MG/1
650 TABLET ORAL ONCE
Status: DISCONTINUED | OUTPATIENT
Start: 2020-03-12 | End: 2020-03-16

## 2020-03-12 RX ORDER — ACETAMINOPHEN 325 MG/1
650 TABLET ORAL EVERY 8 HOURS PRN
Status: DISCONTINUED | OUTPATIENT
Start: 2020-03-12 | End: 2020-03-30 | Stop reason: HOSPADM

## 2020-03-12 RX ORDER — POTASSIUM CHLORIDE 7.45 MG/ML
10 INJECTION INTRAVENOUS
Status: COMPLETED | OUTPATIENT
Start: 2020-03-12 | End: 2020-03-12

## 2020-03-12 RX ORDER — ACETAMINOPHEN 325 MG/1
650 TABLET ORAL ONCE
Status: COMPLETED | OUTPATIENT
Start: 2020-03-12 | End: 2020-03-12

## 2020-03-12 RX ADMIN — POTASSIUM CHLORIDE 10 MEQ: 7.46 INJECTION, SOLUTION INTRAVENOUS at 05:03

## 2020-03-12 RX ADMIN — POTASSIUM CHLORIDE 10 MEQ: 7.46 INJECTION, SOLUTION INTRAVENOUS at 03:03

## 2020-03-12 RX ADMIN — LOSARTAN POTASSIUM 12.5 MG: 25 TABLET, FILM COATED ORAL at 11:03

## 2020-03-12 RX ADMIN — POTASSIUM CHLORIDE 10 MEQ: 7.46 INJECTION, SOLUTION INTRAVENOUS at 04:03

## 2020-03-12 RX ADMIN — POTASSIUM CHLORIDE: 149 INJECTION, SOLUTION, CONCENTRATE INTRAVENOUS at 07:03

## 2020-03-12 RX ADMIN — POTASSIUM CHLORIDE: 149 INJECTION, SOLUTION, CONCENTRATE INTRAVENOUS at 12:03

## 2020-03-12 RX ADMIN — ONDANSETRON 4 MG: 2 INJECTION INTRAMUSCULAR; INTRAVENOUS at 09:03

## 2020-03-12 RX ADMIN — POTASSIUM CHLORIDE, DEXTROSE MONOHYDRATE AND SODIUM CHLORIDE: 150; 5; 450 INJECTION, SOLUTION INTRAVENOUS at 11:03

## 2020-03-12 RX ADMIN — MAGNESIUM SULFATE HEPTAHYDRATE 2 G: 40 INJECTION, SOLUTION INTRAVENOUS at 11:03

## 2020-03-12 RX ADMIN — POTASSIUM CHLORIDE, DEXTROSE MONOHYDRATE AND SODIUM CHLORIDE: 150; 5; 450 INJECTION, SOLUTION INTRAVENOUS at 09:03

## 2020-03-12 RX ADMIN — DEXTROSE 6 G: 5 SOLUTION INTRAVENOUS at 03:03

## 2020-03-12 RX ADMIN — ACETAMINOPHEN 650 MG: 325 TABLET ORAL at 07:03

## 2020-03-12 RX ADMIN — PROMETHAZINE HYDROCHLORIDE 6.25 MG: 25 INJECTION INTRAMUSCULAR; INTRAVENOUS at 06:03

## 2020-03-12 RX ADMIN — HEPARIN SODIUM 5000 UNITS: 5000 INJECTION, SOLUTION INTRAVENOUS; SUBCUTANEOUS at 08:03

## 2020-03-12 RX ADMIN — ONDANSETRON 4 MG: 2 INJECTION INTRAMUSCULAR; INTRAVENOUS at 03:03

## 2020-03-12 RX ADMIN — ACETAMINOPHEN 650 MG: 325 TABLET ORAL at 02:03

## 2020-03-12 NOTE — MEDICAL/APP STUDENT
Progress Note  Hospital Medicine    Primary Team: [facility]  Admit Date: 3/10/2020   Length of Stay:  LOS: 2 days   SUBJECTIVE:   Reason for Admission:  Diabetic ketoacidosis without coma associated with type 2 diabetes mellitus    HPI/Interval history:  Mr. Ryder is a 50 yo M w History of previous DKA without coma (5/30/17), Diabetic ulcer of R foot, T2DM with peripheral neuropathy, HTN, Subacute Osteomyelitis of R foot (1/12/17), and amputation of 5th toe of R foot (7/2/15) who is on Hospital day course 2 for DKA due to insulin noncompliance. Overnight, patient was still quite nauseated. He refused Phenergan last night and denies vomiting. States that he feels upper epigastric discomfort. He is currently NPO with ice chips, however he seems willing to try a diet with toast and hot tea for breakfast. Pt made aware of Podiatry plan for amputation, however he does not appear to be willing to have an amputation. Pt denies any fevers, chills, chest pains, or abdominal pain over night. He does state that he has been getting SOB at times while laying in bed.     Review of Systems:  Review of Systems   Constitutional: Positive for chills and malaise/fatigue. Negative for fever.   Respiratory: Negative for cough, sputum production and wheezing.    Cardiovascular: Negative for chest pain and palpitations.   Gastrointestinal: Positive for nausea and vomiting. Negative for abdominal pain and diarrhea.   Musculoskeletal: Negative for myalgias.   Psychiatric/Behavioral: The patient is nervous/anxious.         OBJECTIVE:     Temp:  [98.4 °F (36.9 °C)-100.4 °F (38 °C)]   Pulse:  [86-97]   Resp:  [15-17]   BP: (153-173)/()   SpO2:  [95 %-99 %]  Body mass index is 27.63 kg/m².  Intake/Outake:  This Shift:  No intake/output data recorded.    Net I/O past 24h:     Intake/Output Summary (Last 24 hours) at 3/12/2020 0936  Last data filed at 3/12/2020 0025  Gross per 24 hour   Intake 4900 ml   Output 1150 ml   Net 3750 ml              Physical Exam:  Physical Exam   Constitutional: No distress.   HENT:   Head: Normocephalic and atraumatic.   Cardiovascular: Normal rate, regular rhythm and normal heart sounds. Exam reveals no gallop and no friction rub.   No murmur heard.  Pulmonary/Chest: Effort normal and breath sounds normal. No respiratory distress. He has no wheezes. He has no rales.   Abdominal: Soft. Bowel sounds are normal. He exhibits no distension. There is no tenderness. There is no rebound and no guarding.   Skin: He is not diaphoretic.   Psychiatric: Mood and affect normal.       Laboratory:  CBC/Anemia Labs: Coags:    Recent Labs   Lab 03/10/20  1057 03/11/20  0340 03/12/20  0436   WBC 6.95 6.25 4.58   HGB 12.7* 12.5* 10.6*   HCT 40.8 38.7* 35.3*    265 235   MCV 93 90 95   RDW 12.3 12.5 12.5    No results for input(s): PT, INR, APTT in the last 168 hours.     Chemistries:   Recent Labs   Lab 03/10/20  1529  03/11/20  0340  03/11/20  2031 03/12/20  0016 03/12/20  0436      < > 144   < > 144 143 143   K 3.7   < > 4.5   < > 4.4 3.2* 3.4*      < > 109   < > 110 109 108   CO2 24   < > 24   < > 21* 25 27   BUN 15   < > 11   < > 5* 5* 4*   CREATININE 1.1   < > 1.0   < > 1.0 1.0 1.0   CALCIUM 8.8   < > 8.2*   < > 8.2* 8.0* 7.9*   PROT 7.8   < > 7.3   < > 7.6 7.1 7.0   BILITOT 0.3   < > 0.2   < > 0.3 0.2 0.2   ALKPHOS 79   < > 73   < > 73 70 70   ALT 7*   < > 7*   < > 8* 7* 7*   AST 15   < > 15   < > 18 13 14   MG 1.8  --  1.7  --   --   --  1.5*   PHOS 2.0*   < > 2.2*   < > 2.5* 2.2* 2.3*    < > = values in this interval not displayed.        Medications:  Scheduled Meds:   atorvastatin  80 mg Oral Daily    ceFAZolin(ANCEF) in D5W 500 mL CONTINUOUS INFUSION  6 g Intravenous Q24H    gabapentin  600 mg Oral Daily    hydroxyzine HCL  50 mg Oral Once    ondansetron  4 mg Intravenous Q8H                             Continuous Infusions:   dextrose 5 % and 0.45 % NaCl with KCl 20 mEq      insulin (HUMAN R)  infusion (adults) 2.6 Units/hr (03/11/20 1924)     PRN Meds:.acetaminophen, Dextrose 10% Bolus, Dextrose 10% Bolus, glucagon (human recombinant), glucose, glucose, hydrALAZINE, ondansetron, potassium chloride 10%, promethazine (PHENERGAN) IVPB, sodium chloride 0.9%     ASSESSMENT/PLAN:     Active Hospital Problems    Diagnosis  POA    *Diabetic ketoacidosis without coma associated with type 2 diabetes mellitus [E11.10]  Yes    Osteomyelitis of left foot [M86.9]  Yes    Bacteremia [R78.81]  Yes    Diabetic ulcer of left midfoot associated with type 2 diabetes mellitus, with bone involvement without evidence of necrosis [E11.621, L97.426]  Yes    Impaired gait and mobility [R26.89]  Yes    Anemia [D64.9]  Yes    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       Assessment  Mr. Ryder is a 50 yo M w/ MHx of T2DM, HTN, diabetic neuropathy, diabetic foot with multiple amputations and L foot ulcer who Px to the ED with DKA from medication noncompliance.     Plan    DKA  Continue 10% Dextrose IVF solution  Phos q4 (2.5), Mg q4 (1.5), CMP q4,   Blood cultures still bending  Glucose stable  Gradually move off NPO and try to tolerate a toast diet to control N/V  Aceteminophen as needed for Headaches.   Ondansteron 8 mg q8; currently on Phenergan 6.25 mg IV    Diabetic Ulcer of L Foot  Vitals stable, afebrile currently  Pt on 6g IV cefazolin as cultures came back positive for Staph  MRI shows acute osteomyelitis of 4th metatarsal head and neck of L Proximal phalanx  Podiatry plan for amputation- seems unwilling, but will  on benefits of amputation    HTN  Losartan is currently help bc of hypotensive  episodes, but should be restarted.

## 2020-03-12 NOTE — PLAN OF CARE
Problem: Physical Therapy Goal  Goal: Physical Therapy Goal  Description  Goals to be met by: 3/26/2020    Patient will increase functional independence with mobility by performin. Supine to sit with Set-up [place]  2. Sit to stand transfer with Supervision with RW  3. Bed to chair transfer with Supervision using with RW  4. Gait  x 100 feet with Supervision to Stand-by Assistance using Rolling Walker.   5. Ascend/descend 4 stairs with right Handrail Minimal Assistance using Rolling Walker.   5. Lower extremity exercise program x10 reps per handout, with supervision     Outcome: Ongoing, Progressing     Evaluation completed and goals appropriate.     Ander Russell, SPT  3/12/2020

## 2020-03-12 NOTE — PLAN OF CARE
Evaluation complete and goals set.  Cont with POC  Deena Valencia, OT  3/12/2020    Problem: Occupational Therapy Goal  Goal: Occupational Therapy Goal  Description  Goals to be met by: 3/30     Patient will increase functional independence with ADLs by performing:    UE Dressing with [place].  LE Dressing with [place].  Grooming while standing with [place].  Toileting from toilet with [place] for hygiene and clothing management.      Outcome: Ongoing, Progressing

## 2020-03-12 NOTE — PLAN OF CARE
This SW is in communication with  and medical team. SW will continue to follow for discharge needs and offer support as needed.    Huma Tan LMSW  Case Management   [facility]

## 2020-03-12 NOTE — PROGRESS NOTES
[facility]  Infectious Disease  Progress Note    Patient Name: Indio Ryder Jr.  MRN: [id number]  Admission Date: 3/10/2020  Length of Stay: 2 days  Attending Physician: Antonio Valladares MD  Primary Care Provider: Lorena Thakur MD    Isolation Status: No active isolations  Assessment/Plan:      Bacteremia  Repeat blood cultures NGTD  Recommend TTE  Plan as above  Continue current abx therapy    Osteomyelitis of left foot  50 y/o with a history of poorly controlled DM2 ( last Ha1c 8.4 ), diabetic neuropathy, diabatic retinopathy and diabetic foot ulcer of left foot with multiple amputation who presents to ED with chief complaint of fever, chills and nausea and worsening wounds of his left foot. His blood cultures on admit are positive with GPCs. He was seen by podiatry 3/5/2020. Bone and wound cultures +MSSA. He was given course of doxycycline.     MRI studies confirms acute osteomyelitis of the 4th metatarsal head and neck and proximal phalanx.  No rim enhancing fluid collection or drainable abscess.  ALISSA studies wnl. Seen by podiatry and plans to undergo bedside debridement. Pt refusing amputation at this time. Blood cultures on admit are positive with GPCs.       Recommendations  1. Continue cefazolin 6g continuous infusion.   2. Podiatry following- plans for bedside debridement as pt refusing surgical amputation. No need for further bone biopsy as had bone cultures obtained  3. Blood cultures on admit +GPCs. Likely MSSA. Continue cefazolin.   4. Recommend TTE   5. Anticipate long term IV abx therapy         Thank you for your consult. I will follow-up with patient. Please contact us if you have any additional questions.    Florence Doe PA-C  Infectious Disease  [facility]    Subjective:     Principal Problem:Diabetic ketoacidosis without coma associated with type 2 diabetes mellitus    HPI: Mr. Ryder is 50 y/o with a history of poorly controlled DM2 ( last  Ha1c 8.4 ) , DKA with coma, diabetic neuropathy, diabatic retinopathy and diabetic foot with multiple amputation who presents to ED with chief complaint of chills associated with nausea and vomiting for one day. He sates that yesterday he started to feel ill and he has been having chills associated with nauseas and vomiting. He also endorses poor appetite with decreased oral intake for the last week. Denies fever, abdominal pain, feet or leg pain, leg swelling, CP, SOB, dysuria. He a history of poorly control diabetes with amputation of his right big and 5th toes. He undergone left big toe detriment and imputation on 7/14/2019. He reports not feeling well for the last couple of weeks and he felt about to pass out on Mardi gras and his BP was in 90/50. He was seen by podiatry last  Thursday when he had his wound opened and he was prescribed doxycycline for 10 days.    Bone cultures +MSSA on 3/5/2020. He is currently on vancomycin and zosyn.     MRI studies confirms acute osteomyelitis of the 4th metatarsal head and neck and proximal phalanx.  No rim enhancing fluid collection or drainable abscess.  ALISSA studies wnl  Blood cultures on admit are positive with GPCs    Interval History:   Repeat blood cultures are NGTD  tmax 100.8. Feels much improved  No acute complaints or concerns  Refusing amputation     Review of Systems   Constitutional: Positive for fatigue and fever. Negative for activity change, appetite change, chills and diaphoresis.   Respiratory: Negative for cough and shortness of breath.    Cardiovascular: Positive for leg swelling.   Gastrointestinal: Positive for nausea. Negative for abdominal pain, constipation, diarrhea and vomiting.   Genitourinary: Negative for dysuria.   Musculoskeletal: Negative for back pain.   Skin: Positive for wound. Negative for color change, pallor and rash.   Neurological: Negative for dizziness, light-headedness and headaches.   All other systems reviewed and are  negative.    Objective:     Vital Signs (Most Recent):  Temp: 100.1 °F (37.8 °C) (03/12/20 1512)  Pulse: 99 (03/12/20 1512)  Resp: 18 (03/12/20 1512)  BP: 130/72 (03/12/20 1512)  SpO2: 95 % (03/12/20 1512) Vital Signs (24h Range):  Temp:  [98.4 °F (36.9 °C)-100.8 °F (38.2 °C)] 100.1 °F (37.8 °C)  Pulse:  [86-99] 99  Resp:  [16-18] 18  SpO2:  [95 %-99 %] 95 %  BP: (130-173)/() 130/72     Weight: 95 kg (209 lb 7 oz)  Body mass index is 27.63 kg/m².    Estimated Creatinine Clearance: 109.7 mL/min (based on SCr of 0.9 mg/dL).    Physical Exam   Constitutional: He appears well-developed and well-nourished. No distress.   HENT:   Head: Normocephalic.   Eyes: Pupils are equal, round, and reactive to light.   Cardiovascular: Normal rate, regular rhythm, normal heart sounds and intact distal pulses.   No murmur heard.  Pulmonary/Chest: Effort normal. No stridor. No respiratory distress. He has no wheezes.   Abdominal: Soft. He exhibits no distension. There is no tenderness.   Musculoskeletal: Normal range of motion. He exhibits edema. He exhibits no tenderness or deformity.   Neurological: He is alert.   Skin: Skin is warm and dry. He is not diaphoretic. There is erythema.   Psychiatric: He has a normal mood and affect.   Vitals reviewed.      Significant Labs: All pertinent labs within the past 24 hours have been reviewed.    Significant Imaging: I have reviewed all pertinent imaging results/findings within the past 24 hours.

## 2020-03-12 NOTE — SUBJECTIVE & OBJECTIVE
Interval History: Patient reports persistent nausea and intermittent vomiting. Patient still on insulin gtt as he is not ready to for PO diet.     Review of Systems   Constitutional: Positive for appetite change and chills. Negative for activity change, fatigue and fever.   HENT: Negative for congestion, facial swelling, sore throat and trouble swallowing.    Respiratory: Negative for apnea, cough, chest tightness, shortness of breath and wheezing.    Cardiovascular: Negative for chest pain, palpitations and leg swelling.   Gastrointestinal: Positive for nausea and vomiting. Negative for abdominal distention, abdominal pain, blood in stool and diarrhea.   Genitourinary: Negative for difficulty urinating, dysuria, flank pain and hematuria.   Musculoskeletal: Negative for arthralgias, back pain, myalgias and neck pain.   Skin: Negative for color change and rash.   Neurological: Negative for dizziness, weakness, light-headedness, numbness and headaches.   Hematological: Negative for adenopathy.   Psychiatric/Behavioral: Negative for agitation and behavioral problems.     Objective:     Vital Signs (Most Recent):  Temp: 100.1 °F (37.8 °C) (03/12/20 1512)  Pulse: 99 (03/12/20 1512)  Resp: 18 (03/12/20 1512)  BP: 130/72 (03/12/20 1512)  SpO2: 95 % (03/12/20 1512) Vital Signs (24h Range):  Temp:  [99.1 °F (37.3 °C)-100.8 °F (38.2 °C)] 100.1 °F (37.8 °C)  Pulse:  [86-99] 99  Resp:  [16-18] 18  SpO2:  [95 %-99 %] 95 %  BP: (130-173)/(72-99) 130/72     Weight: 95 kg (209 lb 7 oz)  Body mass index is 27.63 kg/m².    Intake/Output Summary (Last 24 hours) at 3/12/2020 1702  Last data filed at 3/12/2020 0025  Gross per 24 hour   Intake 4900 ml   Output 1150 ml   Net 3750 ml      Physical Exam   Constitutional: He is oriented to person, place, and time. He appears well-developed and well-nourished. No distress.   Looks ill    HENT:   Head: Normocephalic and atraumatic.   Eyes: Pupils are equal, round, and reactive to light.  Conjunctivae and EOM are normal.   Neck: Normal range of motion. Neck supple.   Cardiovascular: Normal rate, regular rhythm and normal heart sounds.   No murmur heard.  Pulmonary/Chest: Effort normal and breath sounds normal. No respiratory distress. He has no wheezes. He has no rales.   Abdominal: Soft. Bowel sounds are normal. He exhibits no distension. There is no tenderness.   Musculoskeletal: He exhibits no edema or deformity.   Lymphadenopathy:     He has no cervical adenopathy.   Neurological: He is alert and oriented to person, place, and time. He displays normal reflexes. No cranial nerve deficit or sensory deficit. He exhibits normal muscle tone.   Skin: Skin is warm. Capillary refill takes less than 2 seconds. He is not diaphoretic.   Psychiatric: He has a normal mood and affect. His behavior is normal.       Significant Labs:   A1C:   Recent Labs   Lab 12/03/19  1019 03/12/20  1224   HGBA1C 8.4* 12.0*     CBC:   Recent Labs   Lab 03/11/20  0340 03/12/20  0436   WBC 6.25 4.58   HGB 12.5* 10.6*   HCT 38.7* 35.3*    235     CMP:   Recent Labs   Lab 03/12/20  0436 03/12/20  0858 03/12/20  1224    144 144   K 3.4* 3.4* 3.4*    108 106   CO2 27 27 29   * 196* 162*   BUN 4* 3* 3*   CREATININE 1.0 0.9 0.9   CALCIUM 7.9* 7.8* 8.1*   PROT 7.0 6.8 7.3   ALBUMIN 2.1* 2.0* 2.2*   BILITOT 0.2 0.2 0.3   ALKPHOS 70 65 68   AST 14 13 14   ALT 7* 6* 7*   ANIONGAP 8 9 9   EGFRNONAA >60.0 >60.0 >60.0     POCT Glucose:   Recent Labs   Lab 03/12/20  1444 03/12/20  1601 03/12/20  1654   POCTGLUCOSE 164* 165* 159*     All pertinent labs within the past 24 hours have been reviewed.    Significant Imaging: I have reviewed all pertinent imaging results/findings within the past 24 hours.

## 2020-03-12 NOTE — PROGRESS NOTES
[facility]  Podiatry  Progress Note    Patient Name: Indio Ryder Jr.  MRN: [id number]  Admission Date: 3/10/2020  Hospital Length of Stay: 2 days  Attending Physician: Antonio Valladares MD  Primary Care Provider: Lorena Thakur MD   Interval Hx:  Patient Seen at bedside for dressing change.  Patient spiking a fever today, complains of nausea and vomiting.  Dressings clean, dry, and intact.    Scheduled Meds:   acetaminophen  650 mg Oral Once    atorvastatin  80 mg Oral Daily    ceFAZolin(ANCEF) in D5W 500 mL CONTINUOUS INFUSION  6 g Intravenous Q24H    gabapentin  600 mg Oral Daily    hydroxyzine HCL  50 mg Oral Once    losartan  12.5 mg Oral Daily    ondansetron  4 mg Intravenous Q8H     Continuous Infusions:   dextrose 5 % and 0.45 % NaCl with KCl 20 mEq 100 mL/hr at 03/12/20 0940    insulin (HUMAN R) infusion (adults) 1.6 Units/hr (03/12/20 1253)     PRN Meds:acetaminophen, Dextrose 10% Bolus, Dextrose 10% Bolus, glucagon (human recombinant), glucose, glucose, hydrALAZINE, ondansetron, potassium chloride 10%, promethazine (PHENERGAN) IVPB, sodium chloride 0.9%    Review of patient's allergies indicates:  No Known Allergies     Past Medical History:   Diagnosis Date    Actinomyces infection 1/17/2017    Right foot    Diabetic ketoacidosis without coma associated with type 2 diabetes mellitus 5/30/2017    Diabetic ulcer of right foot associated with type 2 diabetes mellitus 6/3/2015    Essential hypertension 6/6/2013    Group B streptococcal infection 1/13/2017    Mixed hyperlipidemia 8/12/2014    Shoulder impingement 8/12/2014    Subacute osteomyelitis of right foot 1/12/2017    Streptococcus agalactiae, Actinomyces odontolyticus    Traumatic amputation of fifth toe of right foot 07/02/2015    Type 2 diabetes mellitus with diabetic neuropathy, with long-term current use of insulin 5/3/2016    Ulcerative colitis, unspecified      Past Surgical History:   Procedure  Laterality Date    DEBRIDEMENT OF FOOT Left 7/14/2019    Procedure: DEBRIDEMENT, FOOT, with left 5th ray amputation;  Surgeon: Sis Hickman DPM;  [facility];  Service: Podiatry;  Laterality: Left;    DEBRIDEMENT OF FOOT Left 7/17/2019    Procedure: DEBRIDEMENT, FOOT with leftt 5th ray partial amputation with Neox Graft;  Surgeon: Mai Burrell DPM;  [facility];  Service: Podiatry;  Laterality: Left;  t    TOE AMPUTATION Right 06/05/2015    TOE AMPUTATION Right 01/19/2017       Family History     Problem Relation (Age of Onset)    Cancer Mother    Cataracts Father    Diabetes Mother, Sister    Hypertension Mother, Father, Maternal Aunt    No Known Problems Brother, Sister, Sister, Maternal Uncle, Paternal Aunt, Paternal Uncle, Maternal Grandmother, Maternal Grandfather, Paternal Grandmother, Paternal Grandfather        Tobacco Use    Smoking status: Never Smoker    Smokeless tobacco: Never Used   Substance and Sexual Activity    Alcohol use: No    Drug use: No    Sexual activity: Yes     Partners: Female     Birth control/protection: Condom     Review of Systems   Constitutional: Positive for appetite change, chills, fatigue and fever.   Gastrointestinal: Positive for nausea and vomiting. Negative for diarrhea.   Musculoskeletal: Negative for arthralgias, joint swelling and myalgias.   Skin: Positive for wound.   Neurological: Positive for weakness and numbness.   Psychiatric/Behavioral: Negative for behavioral problems and confusion.     Objective:     Vital Signs (Most Recent):  Temp: (!) 100.8 °F (38.2 °C) (03/12/20 1111)  Pulse: 89 (03/12/20 1111)  Resp: 16 (03/12/20 1111)  BP: (!) 159/99 (03/12/20 1111)  SpO2: 96 % (03/12/20 1111) Vital Signs (24h Range):  Temp:  [98.4 °F (36.9 °C)-100.8 °F (38.2 °C)] 100.8 °F (38.2 °C)  Pulse:  [86-97] 89  Resp:  [16-17] 16  SpO2:  [95 %-99 %] 96 %  BP: (153-173)/() 159/99     Weight: 95 kg (209 lb 7 oz)  Body mass  index is 27.63 kg/m².    Foot Exam    General  Orientation: alert and oriented to person, place, and time   Affect: appropriate       Right Foot/Ankle     Inspection and Palpation  Ecchymosis: none  Tenderness: none   Swelling: none   Skin Exam: skin intact;     Neurovascular  Dorsalis pedis: 1+  Posterior tibial: 1+  Saphenous nerve sensation: diminished  Tibial nerve sensation: diminished  Superficial peroneal nerve sensation: diminished  Deep peroneal nerve sensation: diminished  Sural nerve sensation: diminished    Comments  Amputation of 1,5 digits    Left Foot/Ankle      Inspection and Palpation  Ecchymosis: none  Tenderness: none   Swelling: none   Skin Exam: drainage and ulcer;     Neurovascular  Dorsalis pedis: 1+  Posterior tibial: 1+  Saphenous nerve sensation: diminished  Tibial nerve sensation: diminished  Superficial peroneal nerve sensation: diminished  Deep peroneal nerve sensation: diminished  Sural nerve sensation: diminished    Comments  Partial 5th ray amputation      Laboratory:  A1C:   Recent Labs   Lab 12/03/19  1019   HGBA1C 8.4*     CBC:   Recent Labs   Lab 03/12/20  0436   WBC 4.58   RBC 3.70*   HGB 10.6*   HCT 35.3*      MCV 95   MCH 28.6   MCHC 30.0*     CMP:   Recent Labs   Lab 03/12/20  1224   *   CALCIUM 8.1*   ALBUMIN 2.2*   PROT 7.3      K 3.4*   CO2 29      BUN 3*   CREATININE 0.9   ALKPHOS 68   ALT 7*   AST 14   BILITOT 0.3     CRP:   Recent Labs   Lab 03/05/20  1617   CRP 75.3*     ESR:   Recent Labs   Lab 03/05/20  1617   SEDRATE 35*     Wound Cultures:   Recent Labs   Lab 03/05/20  1611 03/05/20  1612   LABAERO STAPHYLOCOCCUS AUREUS  Many  Skin janes also present  * STAPHYLOCOCCUS AUREUS  Moderate  Skin janes also present  *     Microbiology Results (last 7 days)     Procedure Component Value Units Date/Time    Blood culture [[id number]] Collected:  03/10/20 1213    Order Status:  Completed Specimen:  Blood from Peripheral, Wrist, Right Updated:   03/12/20 1015     Blood Culture, Routine Gram stain aer bottle: Gram positive cocci in clusters resembling Staph       Results called to and read back by: Erma Daniel RN 03/11/2020        14:54      Gram stain mary bottle: Gram positive cocci in clusters resembling Staph    Blood culture [[id number]] Collected:  03/11/20 1156    Order Status:  Completed Specimen:  Blood Updated:  03/11/20 1925     Blood Culture, Routine No Growth to date    Blood culture [[id number]] Collected:  03/10/20 1057    Order Status:  Completed Specimen:  Blood from Peripheral, Antecubital, Left Updated:  03/11/20 1612     Blood Culture, Routine No Growth to date      No Growth to date        Specimen (12h ago, onward)    None          Diagnostic Results:  I have reviewed all pertinent imaging results/findings within the past 24 hours.   Imaging Results          X-Ray Foot Complete Left (Final result)  Result time 03/10/20 11:22:03    Final result by Vinnie Cortés III, MD (03/10/20 11:22:03)                 Narrative:    EXAMINATION:  XR FOOT COMPLETE 3 VIEW LEFT    CLINICAL HISTORY:  Non-pressure chronic ulcer of other part of left foot with unspecified severity    FINDINGS:  No fracture dislocation seen.  There is amputation of the 5th digit and partial amputation of the distal 5th metatarsal.  There is soft tissue swelling.  Osteomyelitis of the distal aspect of the remaining 5th metatarsal could not be excluded.  MRI could be helpful.      Electronically signed by: Vinnie Cortés MD  Date:    03/10/2020  Time:    11:22                                Clinical Findings:  Left foot ulcer  Location: Plantar 3rd/4th metatarsal head  Wound base: 20% granular; 80% fibrotic.  Periwound: viable  Drainage: purulent  Signs of infection: +probes to bone, malodorous, purulent drainage.  Wound tracks at 9 o clock to 3rd metatarsal head and distally at 12 o clock to 4th toe. No erythema, no edema.            Assessment/Plan:     Diabetic ulcer  of left midfoot associated with type 2 diabetes mellitus, with bone involvement without evidence of necrosis  Indio Ryder Jr. is a 49 y.o. male with bacteremia, fever, positive bone biopsy for osteo.      Had long discussion with patient about surgical intervention.  Recommending partial 4th ray amputation versus TMA.  The reason why we would consider TMA is to avoid transfer lesions in the future.  Patient still resistant to doing amputation.  However he will think about it.  We would also like to have infectious diseases opinion on curing infection with IV antibiotics.    -recommend continuing IV antibiotic, ID on board, appreciate Masood  -performed bedside debridement.  See note below for details.  MRI left forefoot demonstrating acute osteomyelitis of the 4th metatarsal head and neck and proximal phalanx.  No rim enhancing fluid collection or drainable abscess.  Currently no plans for surgical intervention.  Will talk again with patient tomorrow    Procedure note: 03/12/2020  Procedure: Debridement of wound    Surgeon: Dr. Erickson  Assisting  Surgeon: Dr. Martin Magallon, DPM, PGY3  Diagnosis: osteomyelitis    Findings: With patients verbal permission 5 mL of lidocaine plain was injected.  The left wound was debrided excisionally of skin, and adipose to the level of fat with 15 blade and curette.  Was found to probe proximally, an incision was made and soft tissue dissection was used to deepen the incision.  No purulent pus was found.  Deep culture swabs were obtained and sent to cultures and sensitivities.  Wound was then aggressively lavaged with sterile saline.  Post debridement measurements were 5.0 x 4.0 by 1.0 cm.  Wound was dressed with Hydrofera Blue Ready, dry sterile dressing    Estimated blood loss: 15mL        Mixed hyperlipidemia  Per primary    Essential hypertension  Per primary        Martin Magallon MD  Podiatry  [facility]

## 2020-03-12 NOTE — SUBJECTIVE & OBJECTIVE
Interval Hx:  Patient Seen at bedside for dressing change.  Patient spiking a fever today, complains of nausea and vomiting.  Dressings clean, dry, and intact.    Scheduled Meds:   acetaminophen  650 mg Oral Once    atorvastatin  80 mg Oral Daily    ceFAZolin(ANCEF) in D5W 500 mL CONTINUOUS INFUSION  6 g Intravenous Q24H    gabapentin  600 mg Oral Daily    hydroxyzine HCL  50 mg Oral Once    losartan  12.5 mg Oral Daily    ondansetron  4 mg Intravenous Q8H     Continuous Infusions:   dextrose 5 % and 0.45 % NaCl with KCl 20 mEq 100 mL/hr at 03/12/20 0940    insulin (HUMAN R) infusion (adults) 1.6 Units/hr (03/12/20 1253)     PRN Meds:acetaminophen, Dextrose 10% Bolus, Dextrose 10% Bolus, glucagon (human recombinant), glucose, glucose, hydrALAZINE, ondansetron, potassium chloride 10%, promethazine (PHENERGAN) IVPB, sodium chloride 0.9%    Review of patient's allergies indicates:  No Known Allergies     Past Medical History:   Diagnosis Date    Actinomyces infection 1/17/2017    Right foot    Diabetic ketoacidosis without coma associated with type 2 diabetes mellitus 5/30/2017    Diabetic ulcer of right foot associated with type 2 diabetes mellitus 6/3/2015    Essential hypertension 6/6/2013    Group B streptococcal infection 1/13/2017    Mixed hyperlipidemia 8/12/2014    Shoulder impingement 8/12/2014    Subacute osteomyelitis of right foot 1/12/2017    Streptococcus agalactiae, Actinomyces odontolyticus    Traumatic amputation of fifth toe of right foot 07/02/2015    Type 2 diabetes mellitus with diabetic neuropathy, with long-term current use of insulin 5/3/2016    Ulcerative colitis, unspecified      Past Surgical History:   Procedure Laterality Date    DEBRIDEMENT OF FOOT Left 7/14/2019    Procedure: DEBRIDEMENT, FOOT, with left 5th ray amputation;  Surgeon: Sis Hickman DPM;  Location: [facility] OR [address] [place] [address];  Service: Podiatry;  Laterality: Left;    DEBRIDEMENT OF FOOT Left 7/17/2019     Procedure: DEBRIDEMENT, FOOT with leftt 5th ray partial amputation with Neox Graft;  Surgeon: Mai Burrell DPM;  Location: [facility] OR [address] [place] [address];  Service: Podiatry;  Laterality: Left;  t    TOE AMPUTATION Right 06/05/2015    TOE AMPUTATION Right 01/19/2017       Family History     Problem Relation (Age of Onset)    Cancer Mother    Cataracts Father    Diabetes Mother, Sister    Hypertension Mother, Father, Maternal Aunt    No Known Problems Brother, Sister, Sister, Maternal Uncle, Paternal Aunt, Paternal Uncle, Maternal Grandmother, Maternal Grandfather, Paternal Grandmother, Paternal Grandfather        Tobacco Use    Smoking status: Never Smoker    Smokeless tobacco: Never Used   Substance and Sexual Activity    Alcohol use: No    Drug use: No    Sexual activity: Yes     Partners: Female     Birth control/protection: Condom     Review of Systems   Constitutional: Positive for appetite change, chills, fatigue and fever.   Gastrointestinal: Positive for nausea and vomiting. Negative for diarrhea.   Musculoskeletal: Negative for arthralgias, joint swelling and myalgias.   Skin: Positive for wound.   Neurological: Positive for weakness and numbness.   Psychiatric/Behavioral: Negative for behavioral problems and confusion.     Objective:     Vital Signs (Most Recent):  Temp: (!) 100.8 °F (38.2 °C) (03/12/20 1111)  Pulse: 89 (03/12/20 1111)  Resp: 16 (03/12/20 1111)  BP: (!) 159/99 (03/12/20 1111)  SpO2: 96 % (03/12/20 1111) Vital Signs (24h Range):  Temp:  [98.4 °F (36.9 °C)-100.8 °F (38.2 °C)] 100.8 °F (38.2 °C)  Pulse:  [86-97] 89  Resp:  [16-17] 16  SpO2:  [95 %-99 %] 96 %  BP: (153-173)/() 159/99     Weight: 95 kg (209 lb 7 oz)  Body mass index is 27.63 kg/m².    Foot Exam    General  Orientation: alert and oriented to person, place, and time   Affect: appropriate       Right Foot/Ankle     Inspection and Palpation  Ecchymosis: none  Tenderness: none   Swelling: none   Skin Exam: skin intact;      Neurovascular  Dorsalis pedis: 1+  Posterior tibial: 1+  Saphenous nerve sensation: diminished  Tibial nerve sensation: diminished  Superficial peroneal nerve sensation: diminished  Deep peroneal nerve sensation: diminished  Sural nerve sensation: diminished    Comments  Amputation of 1,5 digits    Left Foot/Ankle      Inspection and Palpation  Ecchymosis: none  Tenderness: none   Swelling: none   Skin Exam: drainage and ulcer;     Neurovascular  Dorsalis pedis: 1+  Posterior tibial: 1+  Saphenous nerve sensation: diminished  Tibial nerve sensation: diminished  Superficial peroneal nerve sensation: diminished  Deep peroneal nerve sensation: diminished  Sural nerve sensation: diminished    Comments  Partial 5th ray amputation      Laboratory:  A1C:   Recent Labs   Lab 12/03/19  1019   HGBA1C 8.4*     CBC:   Recent Labs   Lab 03/12/20  0436   WBC 4.58   RBC 3.70*   HGB 10.6*   HCT 35.3*      MCV 95   MCH 28.6   MCHC 30.0*     CMP:   Recent Labs   Lab 03/12/20  1224   *   CALCIUM 8.1*   ALBUMIN 2.2*   PROT 7.3      K 3.4*   CO2 29      BUN 3*   CREATININE 0.9   ALKPHOS 68   ALT 7*   AST 14   BILITOT 0.3     CRP:   Recent Labs   Lab 03/05/20  1617   CRP 75.3*     ESR:   Recent Labs   Lab 03/05/20  1617   SEDRATE 35*     Wound Cultures:   Recent Labs   Lab 03/05/20  1611 03/05/20  1612   LABAERO STAPHYLOCOCCUS AUREUS  Many  Skin janes also present  * STAPHYLOCOCCUS AUREUS  Moderate  Skin janse also present  *     Microbiology Results (last 7 days)     Procedure Component Value Units Date/Time    Blood culture [[id number]] Collected:  03/10/20 1213    Order Status:  Completed Specimen:  Blood from Peripheral, Wrist, Right Updated:  03/12/20 1015     Blood Culture, Routine Gram stain aer bottle: Gram positive cocci in clusters resembling Staph       Results called to and read back by: Erma Daniel RN 03/11/2020        14:54      Gram stain mary bottle: Gram positive cocci in clusters  resembling Staph    Blood culture [[id number]] Collected:  03/11/20 1156    Order Status:  Completed Specimen:  Blood Updated:  03/11/20 1925     Blood Culture, Routine No Growth to date    Blood culture [[id number]] Collected:  03/10/20 1057    Order Status:  Completed Specimen:  Blood from Peripheral, Antecubital, Left Updated:  03/11/20 1612     Blood Culture, Routine No Growth to date      No Growth to date        Specimen (12h ago, onward)    None          Diagnostic Results:  I have reviewed all pertinent imaging results/findings within the past 24 hours.   Imaging Results          X-Ray Foot Complete Left (Final result)  Result time 03/10/20 11:22:03    Final result by Vinnie Cortés III, MD (03/10/20 11:22:03)                 Narrative:    EXAMINATION:  XR FOOT COMPLETE 3 VIEW LEFT    CLINICAL HISTORY:  Non-pressure chronic ulcer of other part of left foot with unspecified severity    FINDINGS:  No fracture dislocation seen.  There is amputation of the 5th digit and partial amputation of the distal 5th metatarsal.  There is soft tissue swelling.  Osteomyelitis of the distal aspect of the remaining 5th metatarsal could not be excluded.  MRI could be helpful.      Electronically signed by: Vinnie Cortés MD  Date:    03/10/2020  Time:    11:22                                Clinical Findings:  Left foot ulcer  Location: Plantar 3rd/4th metatarsal head  Wound base: 20% granular; 80% fibrotic.  Periwound: viable  Drainage: purulent  Signs of infection: +probes to bone, malodorous, purulent drainage.  Wound tracks at 9 o clock to 3rd metatarsal head and distally at 12 o clock to 4th toe. No erythema, no edema.

## 2020-03-12 NOTE — PLAN OF CARE
Reviewed plan of care with pt at the beginning of the shift. Accucheck q 1 hour continued for pt. Potassium replaced for pt. Pt reported n/v throughout the shift but declined phenergan once it came up from pharmacy. No other n/v issues seen throughout the night. Pt reported no pain throughout the shift. Vital signs were stable throughout the shift.Fall precautions continued for pt. Bed locked and at lowest position. Call light within reach. Pt knows to call if assistance is needed.

## 2020-03-12 NOTE — ASSESSMENT & PLAN NOTE
Indio Ryder Jr. is a 49 y.o. male with bacteremia, fever, positive bone biopsy for osteo.      Had long discussion with patient about surgical intervention.  Recommending partial 4th ray amputation versus TMA.  The reason why we would consider TMA is to avoid transfer lesions in the future.  Patient still resistant to doing amputation.  However he will think about it.  We would also like to have infectious diseases opinion on curing infection with IV antibiotics.    -recommend continuing IV antibiotic, ID on board, appreciate Masood  -performed bedside debridement.  See note below for details.  MRI left forefoot demonstrating acute osteomyelitis of the 4th metatarsal head and neck and proximal phalanx.  No rim enhancing fluid collection or drainable abscess.  Currently no plans for surgical intervention.  Will talk again with patient tomorrow    Procedure note: 03/12/2020  Procedure: Debridement of wound    Surgeon: Dr. Erickson  Assisting  Surgeon: Dr. Martin Magallon, DPM, PGY3  Diagnosis: osteomyelitis    Findings: With patients verbal permission 5 mL of lidocaine plain was injected.  The left wound was debrided excisionally of skin, and adipose to the level of fat with 15 blade and curette.  Was found to probe proximally, an incision was made and soft tissue dissection was used to deepen the incision.  No purulent pus was found.  Deep culture swabs were obtained and sent to cultures and sensitivities.  Wound was then aggressively lavaged with sterile saline.  Post debridement measurements were 5.0 x 4.0 by 1.0 cm.  Wound was dressed with Hydrofera Blue Ready, dry sterile dressing    Estimated blood loss: 15mL

## 2020-03-12 NOTE — PT/OT/SLP EVAL
Occupational Therapy   Evaluation    Name: Indio Ryder Jr.  MRN: [id number]  Admitting Diagnosis:  Diabetic ketoacidosis without coma associated with type 2 diabetes mellitus      Recommendations:     Discharge Recommendations: home with home health  Discharge Equipment Recommendations:     Barriers to discharge:  None    Assessment:     Indio Ryder Jr. is a 49 y.o. male with a medical diagnosis of Diabetic ketoacidosis without coma associated with type 2 diabetes mellitus.  He presents sidelying with poor engagement.  Able to follow 1 step directions with time for processing.  Pt with decreased activity tolerance and will benefit from skilled OT for max functional gains and return to prior level of function .  Performance deficits affecting function: weakness, impaired endurance, impaired self care skills.      Rehab Prognosis: Good; patient would benefit from acute skilled OT services to address these deficits and reach maximum level of function.       Plan:     Patient to be seen 3 x/week to address the above listed problems via    · Plan of Care Expires: 04/12/20  · Plan of Care Reviewed with: patient    Subjective     Chief Complaint: decreased activity tolerance   Patient/Family Comments/goals: return to home     Occupational Profile:  Living Environment: Pt lives with his mother in 1 story house with 1 step into home   Previous level of function: Pt indep with self care and home performance prior to admission   Equipment Used at Home:  shower chair  Assistance upon Discharge: family able to assist if needed     Pain/Comfort:  · Pain Rating 1: (unrated headache)    Patients cultural, spiritual, Yazidi conflicts given the current situation: no    Objective:     Communicated with: RN prior to session.  Patient found right sidelying with peripheral IV, telemetry upon OT entry to room.    General Precautions: Standard, fall   Orthopedic Precautions:N/A   Braces: N/A     Occupational  Performance:    Bed Mobility:    · Patient completed Rolling/Turning to Left with  stand by assistance  · Patient completed Rolling/Turning to Right with stand by assistance  · Patient completed Scooting/Bridging with stand by assistance  · Patient completed Supine to Sit with contact guard assistance  · Patient completed Sit to Supine with minimum assistance    Functional Mobility/Transfers:  · Patient completed Sit <> Stand Transfer with minimum assistance  with  rolling walker   · Functional Mobility: Pt with poor activity tolerance     Activities of Daily Living:  · Upper Body Dressing: contact guard assistance    · Lower Body Dressing: contact guard assistance    · Toileting: minimum assistance      Cognitive/Visual Perceptual:  Cognitive/Psychosocial Skills:     -       Oriented to: Person, Place, Time and Situation   -       Follows Commands/attention:Follows multistep  commands  -       Communication: clear/fluent  -       Memory: No Deficits noted  -       Safety awareness/insight to disability: intact   -       Mood/Affect/Coping skills/emotional control: Appropriate to situation    Physical Exam:  Upper Extremity Range of Motion:     -       Right Upper Extremity: WFL  -       Left Upper Extremity: WFL  Upper Extremity Strength:    -       Right Upper Extremity: WFL  -       Left Upper Extremity: WFL    AMPAC 6 Click ADL:  AMPAC Total Score: 18    Treatment & Education:  Evaluation complete and goals set.  Cont with POC  Pt educated on safety, role of OT, importance of increased participation in self care for gains , expectations for participation, expectations for gains, POC, energy conservation, caregiver strain. White board updated.   - bed mobility training   Education:    Patient left supine with all lines intact    GOALS:   Multidisciplinary Problems     Occupational Therapy Goals        Problem: Occupational Therapy Goal    Goal Priority Disciplines Outcome Interventions   Occupational Therapy Goal      OT, PT/OT Ongoing, Progressing    Description:  Goals to be met by: 3/30     Patient will increase functional independence with ADLs by performing:    UE Dressing with [place].  LE Dressing with [place].  Grooming while standing with [place].  Toileting from toilet with [place] for hygiene and clothing management.                       History:     Past Medical History:   Diagnosis Date    Actinomyces infection 1/17/2017    Right foot    Diabetic ketoacidosis without coma associated with type 2 diabetes mellitus 5/30/2017    Diabetic ulcer of right foot associated with type 2 diabetes mellitus 6/3/2015    Essential hypertension 6/6/2013    Group B streptococcal infection 1/13/2017    Mixed hyperlipidemia 8/12/2014    Shoulder impingement 8/12/2014    Subacute osteomyelitis of right foot 1/12/2017    Streptococcus agalactiae, Actinomyces odontolyticus    Traumatic amputation of fifth toe of right foot 07/02/2015    Type 2 diabetes mellitus with diabetic neuropathy, with long-term current use of insulin 5/3/2016    Ulcerative colitis, unspecified        Past Surgical History:   Procedure Laterality Date    DEBRIDEMENT OF FOOT Left 7/14/2019    Procedure: DEBRIDEMENT, FOOT, with left 5th ray amputation;  Surgeon: Sis Hickman DPM;  Location: [facility] OR [address] [place] [address];  Service: Podiatry;  Laterality: Left;    DEBRIDEMENT OF FOOT Left 7/17/2019    Procedure: DEBRIDEMENT, FOOT with leftt 5th ray partial amputation with Neox Graft;  Surgeon: Mai Burrell DPM;  Location: [facility] OR [address] [place] [address];  Service: Podiatry;  Laterality: Left;  t    TOE AMPUTATION Right 06/05/2015    TOE AMPUTATION Right 01/19/2017       Time Tracking:     OT Date of Treatment: 03/12/20  OT Start Time: 1035  OT Stop Time: 1055  OT Total Time (min): 20 min    Billable Minutes:Evaluation 10  Therapeutic Activity 10    Deena Valencia OT  3/12/2020

## 2020-03-12 NOTE — PROGRESS NOTES
[facility] Hospital Medicine  Progress Note    Patient Name: Indio Ryder Jr.  MRN: [id number]  Patient Class: IP- Inpatient   Admission Date: 3/10/2020  Length of Stay: 2 days  Attending Physician: Antonio Valladares MD  Primary Care Provider: Lorena Thakur MD    [facility] Medicine Team: [facility] 5 Maria Elena Cline MD    Subjective:     Principal Problem:Diabetic ketoacidosis without coma associated with type 2 diabetes mellitus        HPI:  Mr. Ryder is 50 y/o with a history of poorly controlled DM2 ( last Ha1c 8.4 ) , DKA with coma, diabetic neuropathy, diabatic retinopathy and diabetic foot with multiple amputation who presents to ED with chief complaint of chills associated with nausea and vomiting for one day. He sates that yesterday he started to feel ill and he has been having chills associated with nauseas and vomiting. He also endorses poor appetite with decreased oral intake for the last week. Denies fever, abdominal pain, feet or leg pain, leg swelling, CP, SOB, dysuria. He a history of poorly control diabetes with amputation of his right big and 5th toes. He undergone left big toe detriment and imputation on 7/14/2019. He reports not feeling well for the last couple of weeks and he felt about to pass out on [place]ardi gras and his BP was in 90/50. He was seen by podiatry last  Thursday when he had his wound opened and he was prescribed doxycycline for 10 days.     In ED, he was afebrile, vitally stable. Lab, BS: 382, B-hydroxybutyrate 3.6, HCO3 22,  anion gap 18. WBC: 6.9, lactate: 1.2  X-ray of right food shows soft tissue edema concerning for osteomyelitis. He received single dose of vanc/zosyn and 2L IVF.     Overview/Hospital Course:  Patient with a history of DM2, DKA with coma, osteomyelitis, multiple amputation who present with chills, N/V and found with DKA. Started on insuline gtt and IVF infusion. Pt is currently treated with doxycyline for wound in left foot.  ID and podiatry consulted. MRI with acute osteomyelitis of 4th metatarsal head and neck of left proximal phalanx. ID recommended cefazolin and podiatry plan for for amputation.     Interval History: Patient reports persistent nausea and intermittent vomiting. Patient still on insulin gtt as he is not ready to for PO diet.     Review of Systems   Constitutional: Positive for appetite change and chills. Negative for activity change, fatigue and fever.   HENT: Negative for congestion, facial swelling, sore throat and trouble swallowing.    Respiratory: Negative for apnea, cough, chest tightness, shortness of breath and wheezing.    Cardiovascular: Negative for chest pain, palpitations and leg swelling.   Gastrointestinal: Positive for nausea and vomiting. Negative for abdominal distention, abdominal pain, blood in stool and diarrhea.   Genitourinary: Negative for difficulty urinating, dysuria, flank pain and hematuria.   Musculoskeletal: Negative for arthralgias, back pain, myalgias and neck pain.   Skin: Negative for color change and rash.   Neurological: Negative for dizziness, weakness, light-headedness, numbness and headaches.   Hematological: Negative for adenopathy.   Psychiatric/Behavioral: Negative for agitation and behavioral problems.     Objective:     Vital Signs (Most Recent):  Temp: 100.1 °F (37.8 °C) (03/12/20 1512)  Pulse: 99 (03/12/20 1512)  Resp: 18 (03/12/20 1512)  BP: 130/72 (03/12/20 1512)  SpO2: 95 % (03/12/20 1512) Vital Signs (24h Range):  Temp:  [99.1 °F (37.3 °C)-100.8 °F (38.2 °C)] 100.1 °F (37.8 °C)  Pulse:  [86-99] 99  Resp:  [16-18] 18  SpO2:  [95 %-99 %] 95 %  BP: (130-173)/(72-99) 130/72     Weight: 95 kg (209 lb 7 oz)  Body mass index is 27.63 kg/m².    Intake/Output Summary (Last 24 hours) at 3/12/2020 1702  Last data filed at 3/12/2020 0025  Gross per 24 hour   Intake 4900 ml   Output 1150 ml   Net 3750 ml      Physical Exam   Constitutional: He is oriented to person, place, and time.  He appears well-developed and well-nourished. No distress.   Looks ill    HENT:   Head: Normocephalic and atraumatic.   Eyes: Pupils are equal, round, and reactive to light. Conjunctivae and EOM are normal.   Neck: Normal range of motion. Neck supple.   Cardiovascular: Normal rate, regular rhythm and normal heart sounds.   No murmur heard.  Pulmonary/Chest: Effort normal and breath sounds normal. No respiratory distress. He has no wheezes. He has no rales.   Abdominal: Soft. Bowel sounds are normal. He exhibits no distension. There is no tenderness.   Musculoskeletal: He exhibits no edema or deformity.   Lymphadenopathy:     He has no cervical adenopathy.   Neurological: He is alert and oriented to person, place, and time. He displays normal reflexes. No cranial nerve deficit or sensory deficit. He exhibits normal muscle tone.   Skin: Skin is warm. Capillary refill takes less than 2 seconds. He is not diaphoretic.   Psychiatric: He has a normal mood and affect. His behavior is normal.       Significant Labs:   A1C:   Recent Labs   Lab 12/03/19  1019 03/12/20  1224   HGBA1C 8.4* 12.0*     CBC:   Recent Labs   Lab 03/11/20  0340 03/12/20  0436   WBC 6.25 4.58   HGB 12.5* 10.6*   HCT 38.7* 35.3*    235     CMP:   Recent Labs   Lab 03/12/20  0436 03/12/20  0858 03/12/20  1224    144 144   K 3.4* 3.4* 3.4*    108 106   CO2 27 27 29   * 196* 162*   BUN 4* 3* 3*   CREATININE 1.0 0.9 0.9   CALCIUM 7.9* 7.8* 8.1*   PROT 7.0 6.8 7.3   ALBUMIN 2.1* 2.0* 2.2*   BILITOT 0.2 0.2 0.3   ALKPHOS 70 65 68   AST 14 13 14   ALT 7* 6* 7*   ANIONGAP 8 9 9   EGFRNONAA >60.0 >60.0 >60.0     POCT Glucose:   Recent Labs   Lab 03/12/20  1444 03/12/20  1601 03/12/20  1654   POCTGLUCOSE 164* 165* 159*     All pertinent labs within the past 24 hours have been reviewed.    Significant Imaging: I have reviewed all pertinent imaging results/findings within the past 24 hours.      Assessment/Plan:      * Diabetic  ketoacidosis without coma associated with type 2 diabetes mellitus  50 y/o with a history of poorly controlled DM2 ,diabetic neuropathy, diabatic retinopathy and diabetic foot with multiple amputation who presents to ED with chief complaint of chills associated with nausea and vomiting for one day.   He has a history of foot ulcer of left big toe who currently treated with doxycycline.    -- afebrile, vitally stable.  -- Lab, BS: 382, B-hydroxybutyrate 3.6, HCO3 22,  anion gap 18.  -- WBC: 6.9, lactate: 1.2  -- X-ray of right food shows soft tissue edema concerning for osteomyelitis  -- last Ha1c 8.4  -- wound cx 3/5 positive for staph aureus     Plan :  -- started on insuline gtt   -- started on IVF infusion 100cc/h  -- CMP q4, Phos q4   -- replete electrolyte   -- blood cx drawn, follow up   -- AG closed and BS WNL      Diabetic ulcer of left midfoot associated with type 2 diabetes mellitus, with bone involvement without evidence of necrosis  -- Pt has a hx of poorly controled DM   -- hx of diabetic ulcer with multiple amputations   -- currently treated with doxycyline   -- wound cx positive for S. aureus      Plan :   -- started on ceftriaxone/azithromycin   -- consulted podiatry, appreciate help   -- MRI with acute osteomyelitis of 4th metatarsal head and neck of left proximal phalanx  -- podiatry plan for amputation; ; patient hesitant    -- ID recommend to switch to cefazolin.       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 edema, with long-term current use of insulin  See DKA     Mixed hyperlipidemia    - continue home Atorvastatin 80 mg      Essential hypertension    - pt was started on losartan   - it was stopped because of hypotension episode   - keep holding     VTE Risk Mitigation (From admission, onward)         Ordered     heparin (porcine) injection 5,000 Units  Every 12 hours      03/12/20 1648     Place TEN hose  Until discontinued       03/10/20 1150     IP VTE LOW RISK PATIENT  Once      03/10/20 1150                      Maria Elena Cline MD  Department of Hospital Medicine   [facility]

## 2020-03-12 NOTE — ASSESSMENT & PLAN NOTE
-- Pt has a hx of poorly controled DM   -- hx of diabetic ulcer with multiple amputations   -- currently treated with doxycyline   -- wound cx positive for S. aureus      Plan :   -- started on ceftriaxone/azithromycin   -- consulted podiatry, appreciate help   -- MRI with acute osteomyelitis of 4th metatarsal head and neck of left proximal phalanx  -- podiatry plan for amputation; ; patient hesitant    -- ID recommend to switch to cefazolin.

## 2020-03-12 NOTE — NURSING
[facility]. AAOX4. VSS. Blood glucose 166, continues on insulin drip. Oriented to room and surroundings. Call light placed in reach. Will continue to monitor

## 2020-03-12 NOTE — ASSESSMENT & PLAN NOTE
48 y/o with a history of poorly controlled DM2 ,diabetic neuropathy, diabatic retinopathy and diabetic foot with multiple amputation who presents to ED with chief complaint of chills associated with nausea and vomiting for one day.   He has a history of foot ulcer of left big toe who currently treated with doxycycline.    -- afebrile, vitally stable.  -- Lab, BS: 382, B-hydroxybutyrate 3.6, HCO3 22,  anion gap 18.  -- WBC: 6.9, lactate: 1.2  -- X-ray of right food shows soft tissue edema concerning for osteomyelitis  -- last Ha1c 8.4  -- wound cx 3/5 positive for staph aureus     Plan :  -- started on insuline gtt   -- started on IVF infusion 100cc/h  -- CMP q4, Phos q4   -- replete electrolyte   -- blood cx drawn, follow up   -- AG closed and BS WNL

## 2020-03-12 NOTE — ASSESSMENT & PLAN NOTE
50 y/o with a history of poorly controlled DM2 ( last Ha1c 8.4 ), diabetic neuropathy, diabatic retinopathy and diabetic foot ulcer of left foot with multiple amputation who presents to ED with chief complaint of fever, chills and nausea and worsening wounds of his left foot. His blood cultures on admit are positive with GPCs. He was seen by podiatry 3/5/2020. Bone and wound cultures +MSSA. He was given course of doxycycline.     MRI studies confirms acute osteomyelitis of the 4th metatarsal head and neck and proximal phalanx.  No rim enhancing fluid collection or drainable abscess.  ALISSA studies wnl. Seen by podiatry and plans to undergo bedside debridement. Pt refusing amputation at this time. Blood cultures on admit are positive with GPCs.       Recommendations  1. Continue cefazolin 6g continuous infusion.   2. Podiatry following- plans for bedside debridement as pt refusing surgical amputation. No need for further bone biopsy as had bone cultures obtained  3. Blood cultures on admit +GPCs. Likely MSSA. Continue cefazolin.   4. Recommend TTE   5. Anticipate long term IV abx therapy

## 2020-03-12 NOTE — PT/OT/SLP EVAL
Physical Therapy Evaluation    Patient Name:  Indio Ryder Jr.   MRN:  [id number]    Recommendations:     Discharge Recommendations:  home health PT   Discharge Equipment Recommendations: (TBD)   Barriers to discharge: Inaccessible home (3-4 EARLINE)    Assessment:     Indio Ryder Jr. is a 49 y.o. male admitted with a medical diagnosis of Diabetic ketoacidosis without coma associated with type 2 diabetes mellitus.  He presents with the following impairments/functional limitations:  weakness, impaired functional mobilty, impaired endurance, gait instability, impaired balance, impaired skin (diabetic foot ulcer on bottom of L forefoot). Pt tolerated treatment well, which consisted of bed mobility, transfers, and 2 side-steps along EOB. Pt will continue to need skilled PT services in order to increase his strength, endurance, balance, and overall gait quality. Upon discharge, pt is recommended to seek HHPT with DME determined closer to discharge.     Rehab Prognosis: Fair; patient would benefit from acute skilled PT services to address these deficits and reach maximum level of function.    Recent Surgery: * No surgery found *      Plan:     During this hospitalization, patient to be seen 4 x/week to address the identified rehab impairments via gait training, therapeutic activities, therapeutic exercises and progress toward the following goals:    · Plan of Care Expires:  04/07/20    Subjective     Chief Complaint: pt did not want to amb  Patient/Family Comments/goals: to go home  Pain/Comfort:  Pain Rating 1: 0/10    Patients cultural, spiritual, Advent conflicts given the current situation: no    Living Environment:  Pt lives with his mom in a [facility] with 3-4 EARLINE and a right handrail.   Prior to admission, patients level of function was indep, as pt was not during the RW for amb.  Equipment used at home: shower chair, walker, rolling. Upon discharge, patient will have assistance from family.    Objective:      Communicated with nurse prior to session.  Patient found supine with peripheral IV, telemetry  upon PT entry to room.    General Precautions: Standard, fall   Orthopedic Precautions:N/A(none when pt was evaluated, but pt has a diabetic foot ulcer on bottom of left forefoot; podiatry requested for weight on R LE to be placed on heel)   Braces: N/A     Exams:  · Skin Integrity/Edema:      · -       Skin integrity: diabetic foot ulcer on bottom of L forefoot, which was wrapped in dressings with compression stocking  · RLE ROM: WFL  · RLE Strength: WFL  · LLE ROM: WFL  · LLE Strength: WFL    Functional Mobility:  · Bed Mobility:     · Rolling Right: stand by assistance  · Supine to Sit: stand by assistance  · Sit to Supine: stand by assistance  · Transfers:     · Sit to Stand:  minimum assistance with rolling walker. PT placed foot under pt's heel to ensure pt was not placing weight through L LE. OT taught pt to extend leg upon standing to offload L LE.   · Gait: amb with 2 side steps with RW and SBA to the right towards HOB with hop-to gait pattern on right foot with left heel on ground for balance only. VC's were given for sequencing.       Therapeutic Activities and Exercises:  Discussed POC and discharge recommendations with pt, sister, and aunt. At this time, pt is not fond of the idea of HHPT. Pt and family members gave verbal understanding of such.    AM-PAC 6 CLICK MOBILITY  Total Score:17     Patient left supine with call button in reach and sister and aunt present.    GOALS:   Multidisciplinary Problems     Physical Therapy Goals        Problem: Physical Therapy Goal    Goal Priority Disciplines Outcome Goal Variances Interventions   Physical Therapy Goal     PT, PT/OT Ongoing, Progressing     Description:  Goals to be met by: 3/26/2020    Patient will increase functional independence with mobility by performin. Supine to sit with Set-up [place]  2. Sit to stand transfer with Supervision with  RW  3. Bed to chair transfer with Supervision using with RW  4. Gait  x 100 feet with Supervision to Stand-by Assistance using Rolling Walker.   5. Ascend/descend 4 stairs with right Handrail Minimal Assistance using Rolling Walker.   5. Lower extremity exercise program x10 reps per handout, with supervision                      History:     Past Medical History:   Diagnosis Date    Actinomyces infection 1/17/2017    Right foot    Diabetic ketoacidosis without coma associated with type 2 diabetes mellitus 5/30/2017    Diabetic ulcer of right foot associated with type 2 diabetes mellitus 6/3/2015    Essential hypertension 6/6/2013    Group B streptococcal infection 1/13/2017    Mixed hyperlipidemia 8/12/2014    Shoulder impingement 8/12/2014    Subacute osteomyelitis of right foot 1/12/2017    Streptococcus agalactiae, Actinomyces odontolyticus    Traumatic amputation of fifth toe of right foot 07/02/2015    Type 2 diabetes mellitus with diabetic neuropathy, with long-term current use of insulin 5/3/2016    Ulcerative colitis, unspecified        Past Surgical History:   Procedure Laterality Date    DEBRIDEMENT OF FOOT Left 7/14/2019    Procedure: DEBRIDEMENT, FOOT, with left 5th ray amputation;  Surgeon: Sis Hickman DPM;  Location: [facility] OR [address] [place] [address];  Service: Podiatry;  Laterality: Left;    DEBRIDEMENT OF FOOT Left 7/17/2019    Procedure: DEBRIDEMENT, FOOT with leftt 5th ray partial amputation with Neox Graft;  Surgeon: Mai Burrell DPM;  Location: [facility] OR [address] [place] [address];  Service: Podiatry;  Laterality: Left;  t    TOE AMPUTATION Right 06/05/2015    TOE AMPUTATION Right 01/19/2017       Time Tracking:     PT Received On: 03/12/20  PT Start Time: 1044     PT Stop Time: 1055  PT Total Time (min): 11 min     Billable Minutes: Evaluation 11      VANESA Elizondo  03/12/2020

## 2020-03-12 NOTE — SUBJECTIVE & OBJECTIVE
Interval History:   Repeat blood cultures are NGTD  tmax 100.8. Feels much improved  No acute complaints or concerns  Refusing amputation     Review of Systems   Constitutional: Positive for fatigue and fever. Negative for activity change, appetite change, chills and diaphoresis.   Respiratory: Negative for cough and shortness of breath.    Cardiovascular: Positive for leg swelling.   Gastrointestinal: Positive for nausea. Negative for abdominal pain, constipation, diarrhea and vomiting.   Genitourinary: Negative for dysuria.   Musculoskeletal: Negative for back pain.   Skin: Positive for wound. Negative for color change, pallor and rash.   Neurological: Negative for dizziness, light-headedness and headaches.   All other systems reviewed and are negative.    Objective:     Vital Signs (Most Recent):  Temp: 100.1 °F (37.8 °C) (03/12/20 1512)  Pulse: 99 (03/12/20 1512)  Resp: 18 (03/12/20 1512)  BP: 130/72 (03/12/20 1512)  SpO2: 95 % (03/12/20 1512) Vital Signs (24h Range):  Temp:  [98.4 °F (36.9 °C)-100.8 °F (38.2 °C)] 100.1 °F (37.8 °C)  Pulse:  [86-99] 99  Resp:  [16-18] 18  SpO2:  [95 %-99 %] 95 %  BP: (130-173)/() 130/72     Weight: 95 kg (209 lb 7 oz)  Body mass index is 27.63 kg/m².    Estimated Creatinine Clearance: 109.7 mL/min (based on SCr of 0.9 mg/dL).    Physical Exam   Constitutional: He appears well-developed and well-nourished. No distress.   HENT:   Head: Normocephalic.   Eyes: Pupils are equal, round, and reactive to light.   Cardiovascular: Normal rate, regular rhythm, normal heart sounds and intact distal pulses.   No murmur heard.  Pulmonary/Chest: Effort normal. No stridor. No respiratory distress. He has no wheezes.   Abdominal: Soft. He exhibits no distension. There is no tenderness.   Musculoskeletal: Normal range of motion. He exhibits edema. He exhibits no tenderness or deformity.   Neurological: He is alert.   Skin: Skin is warm and dry. He is not diaphoretic. There is erythema.    Psychiatric: He has a normal mood and affect.   Vitals reviewed.      Significant Labs: All pertinent labs within the past 24 hours have been reviewed.    Significant Imaging: I have reviewed all pertinent imaging results/findings within the past 24 hours.

## 2020-03-12 NOTE — NURSING
Pt A/OX4, VSS, 3LO2 NC- sats 96%, c/o 7/10 pain- PRN oxy given x2 with relief, Tele: afib CVR (HR 90-100s), Hep gtt infusing @ 14ml/hr,LS- diminished/expiratory wheezes, ERICKSON, non-productive cough, 2gm NA diet, up SBA, urinal voids at bedside, calls appropriately, continue POC.   Report given and pat transferred to 1768

## 2020-03-13 LAB
ALBUMIN SERPL BCP-MCNC: 1.8 G/DL (ref 3.5–5.2)
ALBUMIN SERPL BCP-MCNC: 1.9 G/DL (ref 3.5–5.2)
ALP SERPL-CCNC: 62 U/L (ref 55–135)
ALP SERPL-CCNC: 65 U/L (ref 55–135)
ALT SERPL W/O P-5'-P-CCNC: 5 U/L (ref 10–44)
ALT SERPL W/O P-5'-P-CCNC: <5 U/L (ref 10–44)
AMORPH CRY UR QL COMP ASSIST: ABNORMAL
ANION GAP SERPL CALC-SCNC: 8 MMOL/L (ref 8–16)
ANION GAP SERPL CALC-SCNC: 9 MMOL/L (ref 8–16)
ASCENDING AORTA: 3.5 CM
AST SERPL-CCNC: 17 U/L (ref 10–40)
AST SERPL-CCNC: 17 U/L (ref 10–40)
AV INDEX (PROSTH): 0.95
AV MEAN GRADIENT: 4 MMHG
AV PEAK GRADIENT: 5 MMHG
AV VALVE AREA: 3.75 CM2
AV VELOCITY RATIO: 0.82
BACTERIA #/AREA URNS AUTO: ABNORMAL /HPF
BACTERIA BLD CULT: ABNORMAL
BASOPHILS # BLD AUTO: 0.01 K/UL (ref 0–0.2)
BASOPHILS NFR BLD: 0.2 % (ref 0–1.9)
BILIRUB SERPL-MCNC: 0.3 MG/DL (ref 0.1–1)
BILIRUB SERPL-MCNC: 0.3 MG/DL (ref 0.1–1)
BILIRUB UR QL STRIP: NEGATIVE
BSA FOR ECHO PROCEDURE: 2.21 M2
BUN SERPL-MCNC: 4 MG/DL (ref 6–20)
BUN SERPL-MCNC: 5 MG/DL (ref 6–20)
CALCIUM SERPL-MCNC: 7.6 MG/DL (ref 8.7–10.5)
CALCIUM SERPL-MCNC: 7.7 MG/DL (ref 8.7–10.5)
CHLORIDE SERPL-SCNC: 103 MMOL/L (ref 95–110)
CHLORIDE SERPL-SCNC: 103 MMOL/L (ref 95–110)
CLARITY UR REFRACT.AUTO: CLEAR
CO2 SERPL-SCNC: 27 MMOL/L (ref 23–29)
CO2 SERPL-SCNC: 27 MMOL/L (ref 23–29)
COLOR UR AUTO: YELLOW
CREAT SERPL-MCNC: 0.9 MG/DL (ref 0.5–1.4)
CREAT SERPL-MCNC: 1 MG/DL (ref 0.5–1.4)
CV ECHO LV RWT: 0.38 CM
DIFFERENTIAL METHOD: ABNORMAL
DOP CALC AO PEAK VEL: 1.17 M/S
DOP CALC AO VTI: 21.48 CM
DOP CALC LVOT AREA: 3.9 CM2
DOP CALC LVOT DIAMETER: 2.24 CM
DOP CALC LVOT PEAK VEL: 0.96 M/S
DOP CALC LVOT STROKE VOLUME: 80.51 CM3
DOP CALCLVOT PEAK VEL VTI: 20.44 CM
ECHO LV POSTERIOR WALL: 0.95 CM (ref 0.6–1.1)
EOSINOPHIL # BLD AUTO: 0 K/UL (ref 0–0.5)
EOSINOPHIL NFR BLD: 0 % (ref 0–8)
ERYTHROCYTE [DISTWIDTH] IN BLOOD BY AUTOMATED COUNT: 12.5 % (ref 11.5–14.5)
EST. GFR  (AFRICAN AMERICAN): >60 ML/MIN/1.73 M^2
EST. GFR  (AFRICAN AMERICAN): >60 ML/MIN/1.73 M^2
EST. GFR  (NON AFRICAN AMERICAN): >60 ML/MIN/1.73 M^2
EST. GFR  (NON AFRICAN AMERICAN): >60 ML/MIN/1.73 M^2
FINAL PATHOLOGIC DIAGNOSIS: NORMAL
FRACTIONAL SHORTENING: 32 % (ref 28–44)
GLUCOSE SERPL-MCNC: 231 MG/DL (ref 70–110)
GLUCOSE SERPL-MCNC: 368 MG/DL (ref 70–110)
GLUCOSE UR QL STRIP: NEGATIVE
GROSS: NORMAL
HCT VFR BLD AUTO: 34.4 % (ref 40–54)
HGB BLD-MCNC: 10.4 G/DL (ref 14–18)
HGB UR QL STRIP: ABNORMAL
HYALINE CASTS UR QL AUTO: 1 /LPF
IMM GRANULOCYTES # BLD AUTO: 0.02 K/UL (ref 0–0.04)
IMM GRANULOCYTES NFR BLD AUTO: 0.4 % (ref 0–0.5)
INTERVENTRICULAR SEPTUM: 0.86 CM (ref 0.6–1.1)
KETONES UR QL STRIP: ABNORMAL
LA MAJOR: 5 CM
LA MINOR: 5 CM
LA WIDTH: 4.51 CM
LEFT ATRIUM SIZE: 4 CM
LEFT ATRIUM VOLUME INDEX: 35 ML/M2
LEFT ATRIUM VOLUME: 76.67 CM3
LEFT INTERNAL DIMENSION IN SYSTOLE: 3.39 CM (ref 2.1–4)
LEFT VENTRICLE DIASTOLIC VOLUME INDEX: 54.2 ML/M2
LEFT VENTRICLE DIASTOLIC VOLUME: 118.84 ML
LEFT VENTRICLE MASS INDEX: 73 G/M2
LEFT VENTRICLE SYSTOLIC VOLUME INDEX: 21.5 ML/M2
LEFT VENTRICLE SYSTOLIC VOLUME: 47.05 ML
LEFT VENTRICULAR INTERNAL DIMENSION IN DIASTOLE: 5.01 CM (ref 3.5–6)
LEFT VENTRICULAR MASS: 159.9 G
LEUKOCYTE ESTERASE UR QL STRIP: NEGATIVE
LYMPHOCYTES # BLD AUTO: 0.9 K/UL (ref 1–4.8)
LYMPHOCYTES NFR BLD: 15.3 % (ref 18–48)
MAGNESIUM SERPL-MCNC: 1.7 MG/DL (ref 1.6–2.6)
MCH RBC QN AUTO: 28.5 PG (ref 27–31)
MCHC RBC AUTO-ENTMCNC: 30.2 G/DL (ref 32–36)
MCV RBC AUTO: 94 FL (ref 82–98)
MICROSCOPIC COMMENT: ABNORMAL
MONOCYTES # BLD AUTO: 0.2 K/UL (ref 0.3–1)
MONOCYTES NFR BLD: 3 % (ref 4–15)
NEUTROPHILS # BLD AUTO: 4.6 K/UL (ref 1.8–7.7)
NEUTROPHILS NFR BLD: 81.1 % (ref 38–73)
NITRITE UR QL STRIP: NEGATIVE
NRBC BLD-RTO: 0 /100 WBC
PH UR STRIP: 5 [PH] (ref 5–8)
PHOSPHATE SERPL-MCNC: 1.9 MG/DL (ref 2.7–4.5)
PHOSPHATE SERPL-MCNC: 2.1 MG/DL (ref 2.7–4.5)
PLATELET # BLD AUTO: 222 K/UL (ref 150–350)
PMV BLD AUTO: 11.3 FL (ref 9.2–12.9)
POCT GLUCOSE: 207 MG/DL (ref 70–110)
POCT GLUCOSE: 211 MG/DL (ref 70–110)
POCT GLUCOSE: 213 MG/DL (ref 70–110)
POCT GLUCOSE: 218 MG/DL (ref 70–110)
POCT GLUCOSE: 221 MG/DL (ref 70–110)
POCT GLUCOSE: 225 MG/DL (ref 70–110)
POCT GLUCOSE: 228 MG/DL (ref 70–110)
POCT GLUCOSE: 228 MG/DL (ref 70–110)
POCT GLUCOSE: 230 MG/DL (ref 70–110)
POCT GLUCOSE: 234 MG/DL (ref 70–110)
POCT GLUCOSE: 240 MG/DL (ref 70–110)
POCT GLUCOSE: 245 MG/DL (ref 70–110)
POCT GLUCOSE: 255 MG/DL (ref 70–110)
POCT GLUCOSE: 276 MG/DL (ref 70–110)
POCT GLUCOSE: 310 MG/DL (ref 70–110)
POTASSIUM SERPL-SCNC: 3.3 MMOL/L (ref 3.5–5.1)
POTASSIUM SERPL-SCNC: 4 MMOL/L (ref 3.5–5.1)
PROT SERPL-MCNC: 6.4 G/DL (ref 6–8.4)
PROT SERPL-MCNC: 6.6 G/DL (ref 6–8.4)
PROT UR QL STRIP: ABNORMAL
RA MAJOR: 4.21 CM
RA PRESSURE: 3 MMHG
RA WIDTH: 4.48 CM
RBC # BLD AUTO: 3.65 M/UL (ref 4.6–6.2)
RBC #/AREA URNS AUTO: 5 /HPF (ref 0–4)
RIGHT VENTRICULAR END-DIASTOLIC DIMENSION: 3.09 CM
SINUS: 3.86 CM
SODIUM SERPL-SCNC: 138 MMOL/L (ref 136–145)
SODIUM SERPL-SCNC: 139 MMOL/L (ref 136–145)
SP GR UR STRIP: 1.02 (ref 1–1.03)
SQUAMOUS #/AREA URNS AUTO: 1 /HPF
STJ: 3.2 CM
TRICUSPID ANNULAR PLANE SYSTOLIC EXCURSION: 2.16 CM
URN SPEC COLLECT METH UR: ABNORMAL
WBC # BLD AUTO: 5.7 K/UL (ref 3.9–12.7)
WBC #/AREA URNS AUTO: 4 /HPF (ref 0–5)

## 2020-03-13 PROCEDURE — 81001 URINALYSIS AUTO W/SCOPE: CPT

## 2020-03-13 PROCEDURE — 25000003 PHARM REV CODE 250: Performed by: STUDENT IN AN ORGANIZED HEALTH CARE EDUCATION/TRAINING PROGRAM

## 2020-03-13 PROCEDURE — 99233 PR SUBSEQUENT HOSPITAL CARE,LEVL III: ICD-10-PCS | Mod: ,,, | Performed by: STUDENT IN AN ORGANIZED HEALTH CARE EDUCATION/TRAINING PROGRAM

## 2020-03-13 PROCEDURE — 63600175 PHARM REV CODE 636 W HCPCS: Performed by: STUDENT IN AN ORGANIZED HEALTH CARE EDUCATION/TRAINING PROGRAM

## 2020-03-13 PROCEDURE — 80053 COMPREHEN METABOLIC PANEL: CPT

## 2020-03-13 PROCEDURE — 84100 ASSAY OF PHOSPHORUS: CPT

## 2020-03-13 PROCEDURE — 80048 BASIC METABOLIC PNL TOTAL CA: CPT

## 2020-03-13 PROCEDURE — 99233 SBSQ HOSP IP/OBS HIGH 50: CPT | Mod: ,,, | Performed by: PHYSICIAN ASSISTANT

## 2020-03-13 PROCEDURE — 99233 PR SUBSEQUENT HOSPITAL CARE,LEVL III: ICD-10-PCS | Mod: ,,, | Performed by: PHYSICIAN ASSISTANT

## 2020-03-13 PROCEDURE — 36415 COLL VENOUS BLD VENIPUNCTURE: CPT

## 2020-03-13 PROCEDURE — 20600001 HC STEP DOWN PRIVATE ROOM

## 2020-03-13 PROCEDURE — 85025 COMPLETE CBC W/AUTO DIFF WBC: CPT

## 2020-03-13 PROCEDURE — 99233 PR SUBSEQUENT HOSPITAL CARE,LEVL III: ICD-10-PCS | Mod: ,,, | Performed by: PODIATRIST

## 2020-03-13 PROCEDURE — 99233 SBSQ HOSP IP/OBS HIGH 50: CPT | Mod: ,,, | Performed by: PODIATRIST

## 2020-03-13 PROCEDURE — 80053 COMPREHEN METABOLIC PANEL: CPT | Mod: 91

## 2020-03-13 PROCEDURE — C9399 UNCLASSIFIED DRUGS OR BIOLOG: HCPCS | Performed by: STUDENT IN AN ORGANIZED HEALTH CARE EDUCATION/TRAINING PROGRAM

## 2020-03-13 PROCEDURE — 83735 ASSAY OF MAGNESIUM: CPT

## 2020-03-13 PROCEDURE — 63600175 PHARM REV CODE 636 W HCPCS: Performed by: PHYSICIAN ASSISTANT

## 2020-03-13 PROCEDURE — 99233 SBSQ HOSP IP/OBS HIGH 50: CPT | Mod: ,,, | Performed by: STUDENT IN AN ORGANIZED HEALTH CARE EDUCATION/TRAINING PROGRAM

## 2020-03-13 PROCEDURE — 84100 ASSAY OF PHOSPHORUS: CPT | Mod: 91

## 2020-03-13 RX ORDER — IBUPROFEN 200 MG
400 TABLET ORAL EVERY 6 HOURS PRN
Status: COMPLETED | OUTPATIENT
Start: 2020-03-13 | End: 2020-03-13

## 2020-03-13 RX ORDER — POTASSIUM CHLORIDE 20 MEQ/1
40 TABLET, EXTENDED RELEASE ORAL
Status: DISCONTINUED | OUTPATIENT
Start: 2020-03-13 | End: 2020-03-13

## 2020-03-13 RX ORDER — INSULIN ASPART 100 [IU]/ML
5 INJECTION, SOLUTION INTRAVENOUS; SUBCUTANEOUS
Status: DISCONTINUED | OUTPATIENT
Start: 2020-03-13 | End: 2020-03-14

## 2020-03-13 RX ORDER — POTASSIUM CHLORIDE 20 MEQ/1
40 TABLET, EXTENDED RELEASE ORAL 2 TIMES DAILY
Status: DISCONTINUED | OUTPATIENT
Start: 2020-03-13 | End: 2020-03-13

## 2020-03-13 RX ORDER — INSULIN ASPART 100 [IU]/ML
0-5 INJECTION, SOLUTION INTRAVENOUS; SUBCUTANEOUS
Status: DISCONTINUED | OUTPATIENT
Start: 2020-03-13 | End: 2020-03-25

## 2020-03-13 RX ADMIN — HEPARIN SODIUM 5000 UNITS: 5000 INJECTION, SOLUTION INTRAVENOUS; SUBCUTANEOUS at 09:03

## 2020-03-13 RX ADMIN — INSULIN DETEMIR 15 UNITS: 100 INJECTION, SOLUTION SUBCUTANEOUS at 09:03

## 2020-03-13 RX ADMIN — LOSARTAN POTASSIUM 12.5 MG: 25 TABLET, FILM COATED ORAL at 09:03

## 2020-03-13 RX ADMIN — INSULIN ASPART 1 UNITS: 100 INJECTION, SOLUTION INTRAVENOUS; SUBCUTANEOUS at 09:03

## 2020-03-13 RX ADMIN — ONDANSETRON 4 MG: 2 INJECTION INTRAMUSCULAR; INTRAVENOUS at 03:03

## 2020-03-13 RX ADMIN — IBUPROFEN 400 MG: 200 TABLET, FILM COATED ORAL at 04:03

## 2020-03-13 RX ADMIN — INSULIN DETEMIR 15 UNITS: 100 INJECTION, SOLUTION SUBCUTANEOUS at 10:03

## 2020-03-13 RX ADMIN — DEXTROSE 6 G: 5 SOLUTION INTRAVENOUS at 03:03

## 2020-03-13 RX ADMIN — POTASSIUM PHOSPHATE, MONOBASIC AND POTASSIUM PHOSPHATE, DIBASIC 20 MMOL: 224; 236 INJECTION, SOLUTION, CONCENTRATE INTRAVENOUS at 03:03

## 2020-03-13 RX ADMIN — INSULIN ASPART 4 UNITS: 100 INJECTION, SOLUTION INTRAVENOUS; SUBCUTANEOUS at 05:03

## 2020-03-13 RX ADMIN — INSULIN ASPART 5 UNITS: 100 INJECTION, SOLUTION INTRAVENOUS; SUBCUTANEOUS at 04:03

## 2020-03-13 RX ADMIN — ATORVASTATIN CALCIUM 80 MG: 20 TABLET, FILM COATED ORAL at 09:03

## 2020-03-13 RX ADMIN — IBUPROFEN 400 MG: 200 TABLET, FILM COATED ORAL at 11:03

## 2020-03-13 RX ADMIN — ACETAMINOPHEN 650 MG: 325 TABLET ORAL at 05:03

## 2020-03-13 RX ADMIN — HYDRALAZINE HYDROCHLORIDE 25 MG: 25 TABLET, FILM COATED ORAL at 08:03

## 2020-03-13 RX ADMIN — ACETAMINOPHEN 650 MG: 325 TABLET ORAL at 08:03

## 2020-03-13 RX ADMIN — ONDANSETRON 4 MG: 2 INJECTION INTRAMUSCULAR; INTRAVENOUS at 09:03

## 2020-03-13 RX ADMIN — ONDANSETRON 4 MG: 2 INJECTION INTRAMUSCULAR; INTRAVENOUS at 05:03

## 2020-03-13 NOTE — PROGRESS NOTES
[facility]  Infectious Disease  Progress Note    Patient Name: Indio Ryder Jr.  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 His blood cultures on admit are positive with GPCs. He was seen by podiatry 3/5/2020. Bone and wound cultures +MSSA. He was given course of doxycycline.     MRI studies confirms acute osteomyelitis of the 4th metatarsal head and neck and proximal phalanx.  No rim enhancing fluid collection or drainable abscess.  ALISSA studies wnl. Seen by podiatry and plans to undergo bedside debridement. Pt refusing amputation at this time. Blood cultures on admit are positive with coag negative staph. Repeat blood cultures are NGTD. Underwent debridement with podiatry at bedside. Deep wound cultures obtained. Continues to be febrile.        Recommendations  1. Continue cefazolin 6g continuous infusion.   2. Underwent bedside debridement 3/12/2020. Deep wound cultures obtained  3. Blood cultures on admit +coag negative staph. Repeat blood cultures are NGTD. TTE negative.   4. Will need 6 weeks of IV abx from date of bedside I&D (estimated end date 4/23/2020)   5. ID will follow closely with you           Thank you for your consult. I will follow-up with patient. Please contact us if you have any additional questions.    Florence Doe PA-C  Infectious Disease  [facility]  [phone]    Subjective:     Principal Problem:Diabetic ketoacidosis without coma associated with type 2 diabetes  mellitus    HPI: Mr. Ryder is 48 y/o with a history of poorly controlled DM2 ( last Ha1c 8.4 ) , DKA with coma, diabetic neuropathy, diabatic retinopathy and diabetic foot with multiple amputation who presents to ED with chief complaint of chills associated with nausea and vomiting for one day. He sates that yesterday he started to feel ill and he has been having chills associated with nauseas and vomiting. He also endorses poor appetite with decreased oral intake for the last week. Denies fever, abdominal pain, feet or leg pain, leg swelling, CP, SOB, dysuria. He a history of poorly control diabetes with amputation of his right big and 5th toes. He undergone left big toe detriment and imputation on 7/14/2019. He reports not feeling well for the last couple of weeks and he felt about to pass out on Mardi gras and his BP was in 90/50. He was seen by podiatry last  Thursday when he had his wound opened and he was prescribed doxycycline for 10 days.    Bone cultures +MSSA on 3/5/2020. He is currently on vancomycin and zosyn.     MRI studies confirms acute osteomyelitis of the 4th metatarsal head and neck and proximal phalanx.  No rim enhancing fluid collection or drainable abscess.  ALISSA studies wnl  Blood cultures on admit are positive with GPCs    Interval History:   Repeat blood cultures are NGTD  Continues to have fevers. Feels much improved  No acute complaints or concerns  Bedside debridement yesterday. Wound cultures obtained per podiatry  Blood cultures on admit +coag negative Staph    Review of Systems   Constitutional: Positive for fatigue and fever. Negative for activity change, appetite change, chills and diaphoresis.   Respiratory: Negative for cough and shortness of breath.    Cardiovascular: Positive for leg swelling.   Gastrointestinal: Positive for nausea. Negative for abdominal pain, constipation, diarrhea and vomiting.   Genitourinary: Negative for dysuria.   Musculoskeletal: Negative for back pain.    Skin: Positive for wound. Negative for color change, pallor and rash.   Neurological: Negative for dizziness, light-headedness and headaches.   All other systems reviewed and are negative.    Objective:     Vital Signs (Most Recent):  Temp: 98.3 °F (36.8 °C) (03/13/20 1128)  Pulse: 90 (03/13/20 1300)  Resp: 17 (03/13/20 1128)  BP: (!) 143/88 (03/13/20 1300)  SpO2: (!) 93 % (03/13/20 1128) Vital Signs (24h Range):  Temp:  [98.3 °F (36.8 °C)-102.2 °F (39 °C)] 98.3 °F (36.8 °C)  Pulse:  [] 90  Resp:  [16-18] 17  SpO2:  [93 %-95 %] 93 %  BP: (117-190)/(61-99) 143/88     Weight: 94.8 kg (209 lb)  Body mass index is 27.57 kg/m².    Estimated Creatinine Clearance: 109.7 mL/min (based on SCr of 0.9 mg/dL).    Physical Exam   Constitutional: He appears well-developed and well-nourished. No distress.   HENT:   Head: Normocephalic.   Eyes: Pupils are equal, round, and reactive to light.   Cardiovascular: Normal rate, regular rhythm, normal heart sounds and intact distal pulses.   No murmur heard.  Pulmonary/Chest: Effort normal. No stridor. No respiratory distress. He has no wheezes.   Abdominal: Soft. He exhibits no distension. There is no tenderness.   Musculoskeletal: Normal range of motion. He exhibits edema. He exhibits no tenderness or deformity.   Neurological: He is alert.   Skin: Skin is warm and dry. He is not diaphoretic. There is erythema.   Psychiatric: He has a normal mood and affect.   Vitals reviewed.      Significant Labs: All pertinent labs within the past 24 hours have been reviewed.    Significant Imaging: I have reviewed all pertinent imaging results/findings within the past 24 hours.

## 2020-03-13 NOTE — PROGRESS NOTES
Nutrition-Related Diabetes Education      Time Spent: 10 minutes     Learners: Patient    Current HbA1c: A1C 12.0    Is patient aware of their A1c and their goal A1c? Yes    Home diabetes medication(s): Pt unsure    Nutrition Education with handouts: MyPlate, CHO counting     Comments: Pt states he has been educated on diabetic diet in the past, chart review confirms. Pt states he tries his best to count carbohydrates, reinforced carbohydrate counting and the importance of monitoring CHO intake/reading nutrition facts labels. Pt verbalized understanding.     Follow up: Yes    Please consult as needed.    Thank you!  MOISES Witt, LDN

## 2020-03-13 NOTE — PHYSICIAN QUERY
"PT Name: Indio Ryder Jr.  MR #: [id number]     Withdrawn- DV; documented on 3/15 PN    Physician Query Form -Systemic Infectious Process Clarification     CDS/: Arcenio Harris RN               Contact information:   [email or web address]      This form is a permanent document in the medical record.     Query Date: March 13, 2020     By submitting this query, we are merely seeking further clarification of documentation. Please utilize your independent clinical judgment when addressing the question(s) below.    The Medical record contains the following:     Indicators   Supporting Clinical Findings   Location in Medical Record   x HR RR BP Temp      3/11 Progress note- Hospital medicine                3/12  Progress note- Hospital medicine       x Lactic Acid             Procalcitonin 3/10  Lactate: 1.2 Labs     x WBC                Bands                     CRP 3/10 WBC: 6.95  3/11 WBC: 6.25  3/12 WBC: 4.58  3/13 WBC: 5.70    Labs  "  "  "   x Culture(s) 3. Blood cultures on admit +GPCs. Likely MSSA.   3/11 consult- Infectious Disease      AMS, Confusion, LOC, etc.      Organ Dysfunction / Failure     x Bacteremia or Sepsis / Septic Bacteremia:  Plan as above  ;   Continue current abx therapy; Osteomyelitis of left foot 3/11 consult- Infectious Disease     x Known or Suspected Source of Infection documented Diabetic ulcer of left midfoot associated with type 2 diabetes mellitus, with bone involvement without evidence of necrosis;.... The left wound was debrided excisionally of skin, and adipose to the level of fat with 15 blade and curette.  Was found to probe proximally, an incision was made and soft tissue dissection was used to deepen the incision.  No purulent pus was found 3/12  Progress note-Podiatry    (Failed) Outpatient Treatment     x Medication Vancomycin, IV; given 3/10,  3/11  Piperacillin- tazobactam, IV; given 3/10, 3/11  Metronidazole, IV; given 3/11 (2 doses)  Ceftriaxone, IV; " "given 3/10  Cefaxolin, IV; given 3/11-12    NaCl, bolus, 1000 ml; given 3/10     MAR  "  "  "  "    "    Treatment      Other       Provider, please specify diagnosis or diagnoses associated with above clinical findings.      [   ] Sepsis   [   ] Severe Sepsis with Acute Organ Dysfunction/Failure (please specify  organ dysfunction/failure): ___________________   [   ] Other Infectious Disease (please specify): _________________________________   [   ] Other: __________________________________   [  ]  Clinically Undetermined         Please document in your progress notes daily for the duration of treatment until resolved and include in your discharge summary.  "

## 2020-03-13 NOTE — SUBJECTIVE & OBJECTIVE
Interval History: pt had spiking fever overnight multiple time, blood cx 3/10 positive for Staph. Pt denies N/V and he feel his symptoms improved and tolerating PO.  Review of Systems   Constitutional: Positive for fatigue. Negative for activity change and fever.   HENT: Negative for congestion, facial swelling, sore throat and trouble swallowing.    Respiratory: Negative for apnea, cough, chest tightness, shortness of breath and wheezing.    Cardiovascular: Positive for leg swelling. Negative for chest pain and palpitations.   Gastrointestinal: Negative for abdominal distention, abdominal pain, blood in stool and diarrhea.   Genitourinary: Negative for difficulty urinating, dysuria, flank pain and hematuria.   Musculoskeletal: Negative for arthralgias, back pain, myalgias and neck pain.   Skin: Negative for color change and rash.   Neurological: Negative for dizziness, weakness, light-headedness, numbness and headaches.   Hematological: Negative for adenopathy.   Psychiatric/Behavioral: Negative for agitation and behavioral problems.     Objective:     Vital Signs (Most Recent):  Temp: 98.3 °F (36.8 °C) (03/13/20 1128)  Pulse: 90 (03/13/20 1300)  Resp: 17 (03/13/20 1128)  BP: (!) 143/88 (03/13/20 1300)  SpO2: (!) 93 % (03/13/20 1128) Vital Signs (24h Range):  Temp:  [98.3 °F (36.8 °C)-102.2 °F (39 °C)] 98.3 °F (36.8 °C)  Pulse:  [] 90  Resp:  [16-18] 17  SpO2:  [93 %-95 %] 93 %  BP: (117-190)/(61-99) 143/88     Weight: 94.8 kg (209 lb)  Body mass index is 27.57 kg/m².    Intake/Output Summary (Last 24 hours) at 3/13/2020 1527  Last data filed at 3/13/2020 0600  Gross per 24 hour   Intake 1518.8 ml   Output 1050 ml   Net 468.8 ml      Physical Exam   Constitutional: He is oriented to person, place, and time. He appears well-developed and well-nourished. No distress.   Looks well     HENT:   Head: Normocephalic and atraumatic.   Eyes: Pupils are equal, round, and reactive to light. Conjunctivae and EOM are  normal.   Neck: Normal range of motion. Neck supple.   Cardiovascular: Normal rate, regular rhythm and normal heart sounds.   No murmur heard.  Pulmonary/Chest: Effort normal and breath sounds normal. No respiratory distress. He has no wheezes. He has no rales.   Abdominal: Soft. Bowel sounds are normal. He exhibits no distension. There is no tenderness.   Musculoskeletal: He exhibits no edema or deformity.   Lymphadenopathy:     He has no cervical adenopathy.   Neurological: He is alert and oriented to person, place, and time. He displays normal reflexes. No cranial nerve deficit or sensory deficit. He exhibits normal muscle tone.   Skin: Skin is warm. Capillary refill takes less than 2 seconds. He is not diaphoretic.   Psychiatric: He has a normal mood and affect. His behavior is normal.       Significant Labs:   Blood Culture:   Recent Labs   Lab 03/12/20 2108   LABBLOO No Growth to date     CBC:   Recent Labs   Lab 03/12/20  0436 03/13/20  0621   WBC 4.58 5.70   HGB 10.6* 10.4*   HCT 35.3* 34.4*    222     CMP:   Recent Labs   Lab 03/12/20  1938 03/13/20  0012 03/13/20  0621    138 139   K 4.4 4.0 3.3*    103 103   CO2 29 27 27   * 368* 231*   BUN 3* 4* 5*   CREATININE 1.1 1.0 0.9   CALCIUM 8.0* 7.6* 7.7*   PROT 7.3 6.4 6.6   ALBUMIN 2.2* 1.8* 1.9*   BILITOT 0.3 0.3 0.3   ALKPHOS 70 65 62   AST 16 17 17   ALT 6* 5* <5*   ANIONGAP 6* 8 9   EGFRNONAA >60.0 >60.0 >60.0       Significant Imaging: I have reviewed all pertinent imaging results/findings within the past 24 hours.

## 2020-03-13 NOTE — PROGRESS NOTES
[facility]  Podiatry  Progress Note    Patient Name: Indio Ryder Jr.  MRN: [id number]  Admission Date: 3/10/2020  Hospital Length of Stay: 3 days  Attending Physician: Antonio Valladares MD  Primary Care Provider: Lorena Thakur MD   Interval Hx:  S/p bedside debridement 3/12 per Dr. Erickson Patient Seen at bedside for dressing change.  Patient spiking a fever this morning, but says his nausea and vomiting have improved.  Dressings clean, dry, and intact.    Scheduled Meds:   acetaminophen  650 mg Oral Once    atorvastatin  80 mg Oral Daily    ceFAZolin(ANCEF) in D5W 500 mL CONTINUOUS INFUSION  6 g Intravenous Q24H    gabapentin  600 mg Oral Daily    heparin (porcine)  5,000 Units Subcutaneous Q12H    hydroxyzine HCL  50 mg Oral Once    insulin aspart U-100  5 Units Subcutaneous TIDWM    insulin detemir U-100  15 Units Subcutaneous Daily    losartan  12.5 mg Oral Daily    ondansetron  4 mg Intravenous Q8H    potassium phosphate IVPB  20 mmol Intravenous Once     Continuous Infusions:    PRN Meds:acetaminophen, Dextrose 10% Bolus, Dextrose 10% Bolus, glucagon (human recombinant), glucose, glucose, hydrALAZINE, ibuprofen, insulin aspart U-100, ondansetron, promethazine (PHENERGAN) IVPB, sodium chloride 0.9%    Review of patient's allergies indicates:  No Known Allergies     Past Medical History:   Diagnosis Date    Actinomyces infection 1/17/2017    Right foot    Diabetic ketoacidosis without coma associated with type 2 diabetes mellitus 5/30/2017    Diabetic ulcer of right foot associated with type 2 diabetes mellitus 6/3/2015    Essential hypertension 6/6/2013    Group B streptococcal infection 1/13/2017    Mixed hyperlipidemia 8/12/2014    Shoulder impingement 8/12/2014    Subacute osteomyelitis of right foot 1/12/2017    Streptococcus agalactiae, Actinomyces odontolyticus    Traumatic amputation of fifth toe of right foot 07/02/2015    Type 2 diabetes mellitus with  diabetic neuropathy, with long-term current use of insulin 5/3/2016    Ulcerative colitis, unspecified      Past Surgical History:   Procedure Laterality Date    DEBRIDEMENT OF FOOT Left 7/14/2019    Procedure: DEBRIDEMENT, FOOT, with left 5th ray amputation;  Surgeon: Sis Hickman DPM;  Location: [facility] OR [address] [place] [address];  Service: Podiatry;  Laterality: Left;    DEBRIDEMENT OF FOOT Left 7/17/2019    Procedure: DEBRIDEMENT, FOOT with leftt 5th ray partial amputation with Neox Graft;  Surgeon: Mai Burrell DPM;  Location: [facility] OR [address] [place] [address];  Service: Podiatry;  Laterality: Left;  t    TOE AMPUTATION Right 06/05/2015    TOE AMPUTATION Right 01/19/2017       Family History     Problem Relation (Age of Onset)    Cancer Mother    Cataracts Father    Diabetes Mother, Sister    Hypertension Mother, Father, Maternal Aunt    No Known Problems Brother, Sister, Sister, Maternal Uncle, Paternal Aunt, Paternal Uncle, Maternal Grandmother, Maternal Grandfather, Paternal Grandmother, Paternal Grandfather        Tobacco Use    Smoking status: Never Smoker    Smokeless tobacco: Never Used   Substance and Sexual Activity    Alcohol use: No    Drug use: No    Sexual activity: Yes     Partners: Female     Birth control/protection: Condom     Review of Systems   Constitutional: Positive for fever. Negative for appetite change, chills and fatigue.   Gastrointestinal: Positive for nausea. Negative for diarrhea and vomiting.   Musculoskeletal: Negative for arthralgias, joint swelling and myalgias.   Skin: Positive for wound.   Neurological: Positive for weakness and numbness.   Psychiatric/Behavioral: Negative for behavioral problems and confusion.     Objective:     Vital Signs (Most Recent):  Temp: 98.3 °F (36.8 °C) (03/13/20 1128)  Pulse: 90 (03/13/20 1300)  Resp: 17 (03/13/20 1128)  BP: (!) 143/88 (03/13/20 1300)  SpO2: (!) 93 % (03/13/20 1128) Vital Signs (24h Range):  Temp:  [98.3 °F (36.8 °C)-102.2 °F (39 °C)] 98.3 °F  (36.8 °C)  Pulse:  [] 90  Resp:  [16-18] 17  SpO2:  [93 %-95 %] 93 %  BP: (117-190)/(61-99) 143/88     Weight: 94.8 kg (209 lb)  Body mass index is 27.57 kg/m².    Foot Exam    General  Orientation: alert and oriented to person, place, and time   Affect: appropriate       Right Foot/Ankle     Inspection and Palpation  Ecchymosis: none  Tenderness: none   Swelling: none   Skin Exam: skin intact;     Neurovascular  Dorsalis pedis: 1+  Posterior tibial: 1+  Saphenous nerve sensation: diminished  Tibial nerve sensation: diminished  Superficial peroneal nerve sensation: diminished  Deep peroneal nerve sensation: diminished  Sural nerve sensation: diminished    Comments  Amputation of 1,5 digits    Left Foot/Ankle      Inspection and Palpation  Ecchymosis: none  Tenderness: none   Swelling: none   Skin Exam: drainage and ulcer;     Neurovascular  Dorsalis pedis: 1+  Posterior tibial: 1+  Saphenous nerve sensation: diminished  Tibial nerve sensation: diminished  Superficial peroneal nerve sensation: diminished  Deep peroneal nerve sensation: diminished  Sural nerve sensation: diminished    Comments  Partial 5th ray amputation      Laboratory:  A1C:   Recent Labs   Lab 12/03/19  1019 03/12/20  1224   HGBA1C 8.4* 12.0*     CBC:   Recent Labs   Lab 03/13/20  0621   WBC 5.70   RBC 3.65*   HGB 10.4*   HCT 34.4*      MCV 94   MCH 28.5   MCHC 30.2*     CMP:   Recent Labs   Lab 03/13/20  0621   *   CALCIUM 7.7*   ALBUMIN 1.9*   PROT 6.6      K 3.3*   CO2 27      BUN 5*   CREATININE 0.9   ALKPHOS 62   ALT <5*   AST 17   BILITOT 0.3     CRP:   No results for input(s): CRP in the last 168 hours.  ESR:   No results for input(s): SEDRATE in the last 168 hours.  Wound Cultures:   Recent Labs   Lab 03/05/20  1611 03/05/20  1612   LABAERO STAPHYLOCOCCUS AUREUS  Many  Skin janes also present  * STAPHYLOCOCCUS AUREUS  Moderate  Skin janes also present  *     Microbiology Results (last 7 days)     Procedure  Component Value Units Date/Time    Blood culture [[id number]]  (Abnormal) Collected:  03/10/20 1213    Order Status:  Completed Specimen:  Blood from Peripheral, Wrist, Right Updated:  03/13/20 0916     Blood Culture, Routine Gram stain aer bottle: Gram positive cocci in clusters resembling Staph       Results called to and read back by: Erma Daniel RN 03/11/2020        14:54      Gram stain mary bottle: Gram positive cocci in clusters resembling Staph      COAGULASE-NEGATIVE STAPHYLOCOCCUS SPECIES  Organism is a probable contaminant      Blood culture [[id number]] Collected:  03/12/20 2108    Order Status:  Completed Specimen:  Blood Updated:  03/13/20 0515     Blood Culture, Routine No Growth to date    Culture, Anaerobe [[id number]] Collected:  03/12/20 1726    Order Status:  Sent Specimen:  Wound from Foot, Left Updated:  03/12/20 2123    Aerobic culture [[id number]] Collected:  03/12/20 1726    Order Status:  Sent Specimen:  Wound from Foot, Left Updated:  03/12/20 2122    Blood culture [[id number]] Collected:  03/10/20 1057    Order Status:  Completed Specimen:  Blood from Peripheral, Antecubital, Left Updated:  03/12/20 1612     Blood Culture, Routine No Growth to date      No Growth to date      No Growth to date    Blood culture [[id number]] Collected:  03/11/20 1156    Order Status:  Completed Specimen:  Blood Updated:  03/12/20 1412     Blood Culture, Routine No Growth to date      No Growth to date        Specimen (12h ago, onward)    None          Diagnostic Results:  I have reviewed all pertinent imaging results/findings within the past 24 hours.   Imaging Results          X-Ray Foot Complete Left (Final result)  Result time 03/10/20 11:22:03    Final result by Vinnie Cortés III, MD (03/10/20 11:22:03)                 Narrative:    EXAMINATION:  XR FOOT COMPLETE 3 VIEW LEFT    CLINICAL HISTORY:  Non-pressure chronic ulcer of other part of left foot with unspecified severity    FINDINGS:  No fracture  dislocation seen.  There is amputation of the 5th digit and partial amputation of the distal 5th metatarsal.  There is soft tissue swelling.  Osteomyelitis of the distal aspect of the remaining 5th metatarsal could not be excluded.  MRI could be helpful.      Electronically signed by: Vinnie Cortés MD  Date:    03/10/2020  Time:    11:22                                Clinical Findings:  Left foot ulcer  Location: Plantar 3rd/4th metatarsal head  Wound base: 20% granular; 80% fibrotic.  Periwound: viable  Drainage: purulent  Signs of infection: +probes to bone, no mal odor or purulent drainage.  Wound tracks at 9 o clock to 3rd metatarsal head and distally at 12 o clock to 4th toe. No erythema, no edema.    3/13                Assessment/Plan:     Diabetic ulcer of left midfoot associated with type 2 diabetes mellitus, with bone involvement without evidence of necrosis  Indio Ryder Jr. is a 49 y.o. male with bacteremia, fever, positive bone biopsy for osteo. S/p bedside debridement.  Spiking fever this am, but nausea and vomiting improving.    -washed wound out at bedside.  Discussed surgical options again with patient.  Stated that if he continues to have fevers that we will need to take him to the OR at least for a more aggressive washout.  Patient in agreement.  -recommend continuing IV antibiotic, ID on board, appreciate Recs  MRI left forefoot demonstrating acute osteomyelitis of the 4th metatarsal head and neck and proximal phalanx.  No rim enhancing fluid collection or drainable abscess.  Currently no plans for surgical intervention.  Will continue to monitor.    Nursing to do daily betadin WTD dressing changes.      Mixed hyperlipidemia  Per primary    Essential hypertension  Per primary        Martin Parker MD  Podiatry  [facility]

## 2020-03-13 NOTE — NURSING
Pt transported for echo via transport with Ancef infusing. Insulin gtt discontinued per order. Will continue to monitor.

## 2020-03-13 NOTE — PLAN OF CARE
Pt AAOx4, VSS, denies pain. Insulin gtt d/c'ed today at 1pm. Pt transitioned to SQ insulin. Diet advanced to clear liquid. Pt tolerating well. Pt went for echo today. No other changes noted. Will continue to monitor.

## 2020-03-13 NOTE — PROGRESS NOTES
Pharmacokinetic Initial Assessment: IV Vancomycin    Assessment/Plan:    Started on vancomycin 1500 mg in ED on 3/10/20, last dose received on 3/11/20.  Continue vancomycin 1500 mg IV every 12 hours.  Desired empiric serum trough concentration is 15 to 20 mcg/mL.  Draw vancomycin trough level 30 min prior to fourth dose on 3/14/20 at approximately 2200.  Pharmacy will continue to follow and monitor vancomycin.      Please contact pharmacy at extension [phone] with any questions regarding this assessment.     Thank you for the consult,   Kaykay Stanton     Patient brief summary:  Indio Ryder Jr. is a 49 y.o. male initiated on antimicrobial therapy with IV Vancomycin for treatment of suspected bacteremia.    Drug Allergies:   Review of patient's allergies indicates:  No Known Allergies    Actual Body Weight:   95 kg    Renal Function:   Estimated Creatinine Clearance: 109.7 mL/min (based on SCr of 0.9 mg/dL).,     CBC (last 72 hours):  Recent Labs   Lab Result Units 03/10/20  1057 03/11/20  0340 03/12/20  0436 03/12/20  1224 03/13/20  0621   WBC K/uL 6.95 6.25 4.58  --  5.70   Hemoglobin g/dL 12.7* 12.5* 10.6*  --  10.4*   Hemoglobin A1C %  --   --   --  12.0*  --    Hematocrit % 40.8 38.7* 35.3*  --  34.4*   Platelets K/uL 327 265 235  --  222   Gran% %  --  63.0 69.4  --  81.1*   Lymph% %  --  21.9 21.4  --  15.3*   Mono% %  --  9.4 8.5  --  3.0*   Eosinophil% %  --  0.2 0.0  --  0.0   Basophil% %  --  0.5 0.0  --  0.2   Differential Method   --  Automated Automated  --  Automated       Metabolic Panel (last 72 hours):  Recent Labs   Lab Result Units 03/10/20  1057 03/10/20  1529 03/10/20  1929 03/10/20  2043 03/11/20  0022 03/11/20  0340 03/11/20  0830 03/11/20  1156 03/11/20  1541 03/11/20  2031 03/12/20  0016 03/12/20  0436 03/12/20  0858 03/12/20  1224 03/12/20  1604 03/12/20  1938 03/13/20  0012 03/13/20  0100 03/13/20  0621   Sodium mmol/L 138 140 143 142 142 144 144 145 144 144 143 143 144 144 141 138  138  --  139   Potassium mmol/L 3.7 3.7 3.6 3.8 3.6 4.5 3.6 3.5 3.7 4.4 3.2* 3.4* 3.4* 3.4* 3.2* 4.4 4.0  --  3.3*   Chloride mmol/L 98 104 106 106 105 109 110 109 109 110 109 108 108 106 106 103 103  --  103   CO2 mmol/L 22* 24 25 27 25 24 24 27 25 21* 25 27 27 29 24 29 27  --  27   Glucose mg/dL 382* 219* 186* 256* 315* 257* 198* 218* 272* 186* 206* 205* 196* 162* 163* 326* 368*  --  231*   Glucose, UA   --   --   --   --   --   --   --   --   --   --   --   --   --   --   --   --   --  Negative  --    BUN, Bld mg/dL 18 15 15 14 13 11 8 8 7 5* 5* 4* 3* 3* 3* 3* 4*  --  5*   Creatinine mg/dL 1.4 1.1 1.1 1.1 1.1 1.0 0.9 1.0 1.1 1.0 1.0 1.0 0.9 0.9 0.9 1.1 1.0  --  0.9   Albumin g/dL 2.7* 2.3* 2.2* 2.1* 2.3* 2.1* 2.3* 2.2* 2.2* 2.2* 2.1* 2.1* 2.0* 2.2* 2.1* 2.2* 1.8*  --  1.9*   Total Bilirubin mg/dL 0.4 0.3 0.2 0.2 0.2 0.2 0.2 0.2 0.3 0.3 0.2 0.2 0.2 0.3 0.3 0.3 0.3  --  0.3   Alkaline Phosphatase U/L 95 79 74 73 78 73 77 74 75 73 70 70 65 68 67 70 65  --  62   AST U/L 15 15 14 14 16 15 15 14 14 18 13 14 13 14 16 16 17  --  17   ALT U/L 9* 7* 7* 6* 7* 7* 8* 7* 7* 8* 7* 7* 6* 7* 6* 6* 5*  --  <5*   Magnesium mg/dL  --  1.8  --   --   --  1.7  --   --   --   --   --  1.5*  --   --   --   --   --   --  1.7   Phosphorus mg/dL  --  2.0* 2.2* 3.0 3.2 2.2* 1.6* 3.2 3.1 2.5* 2.2* 2.3* 2.1* 2.2* 2.2* 2.7 2.1*  --  1.9*       Drug levels (last 3 results):  No results for input(s): VANCOMYCINRA, VANCOMYCINPE, VANCOMYCINTR in the last 72 hours.    Microbiologic Results:  Microbiology Results (last 7 days)     Procedure Component Value Units Date/Time    Blood culture [[id number]]  (Abnormal) Collected:  03/10/20 1213    Order Status:  Completed Specimen:  Blood from Peripheral, Wrist, Right Updated:  03/13/20 0916     Blood Culture, Routine Gram stain aer bottle: Gram positive cocci in clusters resembling Staph       Results called to and read back by: Erma Daniel RN 03/11/2020        14:54      Gram stain mary bottle:  Gram positive cocci in clusters resembling Staph      COAGULASE-NEGATIVE STAPHYLOCOCCUS SPECIES  Organism is a probable contaminant      Blood culture [[id number]] Collected:  03/12/20 2108    Order Status:  Completed Specimen:  Blood Updated:  03/13/20 0515     Blood Culture, Routine No Growth to date    Culture, Anaerobe [[id number]] Collected:  03/12/20 1726    Order Status:  Sent Specimen:  Wound from Foot, Left Updated:  03/12/20 2123    Aerobic culture [[id number]] Collected:  03/12/20 1726    Order Status:  Sent Specimen:  Wound from Foot, Left Updated:  03/12/20 2122    Blood culture [[id number]] Collected:  03/10/20 1057    Order Status:  Completed Specimen:  Blood from Peripheral, Antecubital, Left Updated:  03/12/20 1612     Blood Culture, Routine No Growth to date      No Growth to date      No Growth to date    Blood culture [[id number]] Collected:  03/11/20 1156    Order Status:  Completed Specimen:  Blood Updated:  03/12/20 1412     Blood Culture, Routine No Growth to date      No Growth to date

## 2020-03-13 NOTE — MEDICAL/APP STUDENT
Progress Note  Hospital Medicine    Primary Team: [facility] HOSP MED 5  Admit Date: 3/10/2020   Length of Stay:  LOS: 3 days   SUBJECTIVE:   Reason for Admission:  Diabetic ketoacidosis without coma associated with type 2 diabetes mellitus    HPI/Interval history:  Mr. Ryder is a 50 yo M w History of previous DKA without coma (5/30/17), Diabetic ulcer of R foot, T2DM with peripheral neuropathy, HTN, Subacute Osteomyelitis of R foot (1/12/17), and amputation of 5th toe of R foot (7/2/15) who is on Hospital day course 3 for DKA due to insulin noncompliance. Overnight, Mr. Ryder has been quite comfortable. He states that he doesn't have any nausea or vomiting Sx overnight. He is still on NPO with ice chips diet. He denies any abdominal pain, headaches, or chest pains. BCs are + for Gram Positive Cocci, so TTE has been recommended by ID.   Pt had spiking fevers overnight with highest temp being 102.2 F.   Review of Systems:  Review of Systems   Constitutional: Negative for chills, fever and malaise/fatigue.   Respiratory: Negative for cough, sputum production and wheezing.    Cardiovascular: Negative for chest pain and palpitations.   Gastrointestinal: Negative for abdominal pain, diarrhea, nausea and vomiting.   Musculoskeletal: Negative for myalgias.   Psychiatric/Behavioral: The patient is not nervous/anxious.         OBJECTIVE:     Temp:  [99.2 °F (37.3 °C)-102.2 °F (39 °C)]   Pulse:  []   Resp:  [16-18]   BP: (117-190)/(61-99)   SpO2:  [93 %-96 %]  Body mass index is 27.63 kg/m².  Intake/Outake:  This Shift:  No intake/output data recorded.    Net I/O past 24h:     Intake/Output Summary (Last 24 hours) at 3/13/2020 0906  Last data filed at 3/13/2020 0600  Gross per 24 hour   Intake 1518.8 ml   Output 1850 ml   Net -331.2 ml             Physical Exam:  Physical Exam   Constitutional: No distress.   HENT:   Head: Normocephalic and atraumatic.   Cardiovascular: Normal rate, regular rhythm and normal heart sounds.  Exam reveals no gallop and no friction rub.   No murmur heard.  Pulmonary/Chest: Effort normal and breath sounds normal. No respiratory distress. He has no wheezes. He has no rales.   Abdominal: Soft. Bowel sounds are normal. He exhibits no distension. There is no tenderness. There is no rebound and no guarding.   Skin: He is not diaphoretic.       Laboratory:  CBC/Anemia Labs: Coags:    Recent Labs   Lab 03/11/20 0340 03/12/20 0436 03/13/20  0621   WBC 6.25 4.58 5.70   HGB 12.5* 10.6* 10.4*   HCT 38.7* 35.3* 34.4*    235 222   MCV 90 95 94   RDW 12.5 12.5 12.5    No results for input(s): PT, INR, APTT in the last 168 hours.     Chemistries:   Recent Labs   Lab 03/11/20 0340 03/12/20  0436  03/12/20  1938 03/13/20  0012 03/13/20  0621      < > 143   < > 138 138 139   K 4.5   < > 3.4*   < > 4.4 4.0 3.3*      < > 108   < > 103 103 103   CO2 24   < > 27   < > 29 27 27   BUN 11   < > 4*   < > 3* 4* 5*   CREATININE 1.0   < > 1.0   < > 1.1 1.0 0.9   CALCIUM 8.2*   < > 7.9*   < > 8.0* 7.6* 7.7*   PROT 7.3   < > 7.0   < > 7.3 6.4 6.6   BILITOT 0.2   < > 0.2   < > 0.3 0.3 0.3   ALKPHOS 73   < > 70   < > 70 65 62   ALT 7*   < > 7*   < > 6* 5* <5*   AST 15   < > 14   < > 16 17 17   MG 1.7  --  1.5*  --   --   --  1.7   PHOS 2.2*   < > 2.3*   < > 2.7 2.1* 1.9*    < > = values in this interval not displayed.        Medications:  Scheduled Meds:   acetaminophen  650 mg Oral Once    atorvastatin  80 mg Oral Daily    ceFAZolin(ANCEF) in D5W 500 mL CONTINUOUS INFUSION  6 g Intravenous Q24H    gabapentin  600 mg Oral Daily    heparin (porcine)  5,000 Units Subcutaneous Q12H    hydroxyzine HCL  50 mg Oral Once    losartan  12.5 mg Oral Daily    ondansetron  4 mg Intravenous Q8H                             Continuous Infusions:   dextrose 5 % and 0.45 % NaCl with KCl 20 mEq 100 mL/hr at 03/12/20 9324    insulin (HUMAN R) infusion (adults) 1.6 Units/hr (03/12/20 1253)     PRN Meds:.acetaminophen,  Dextrose 10% Bolus, Dextrose 10% Bolus, glucagon (human recombinant), glucose, glucose, hydrALAZINE, ibuprofen, ondansetron, potassium chloride 10%, promethazine (PHENERGAN) IVPB, sodium chloride 0.9%     ASSESSMENT/PLAN:     Active Hospital Problems    Diagnosis  POA    *Diabetic ketoacidosis without coma associated with type 2 diabetes mellitus [E11.10]  Yes    Osteomyelitis of left foot [M86.9]  Yes    Bacteremia [R78.81]  Yes    Diabetic ulcer of left midfoot associated with type 2 diabetes mellitus, with bone involvement without evidence of necrosis [E11.621, L97.426]  Yes    Impaired gait and mobility [R26.89]  Yes    Anemia [D64.9]  Yes    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     Assessment  Mr. Ryder is a 50 yo M w MHx of diabetic neuropathy, L Foot ulcer, poorly controlled DM2, and multiple amputations who is on hospital day course 3 for DKA exacerbation due to medical noncompliance.     Diabetic Ketoacidosis  Poorly controlled DM; HBA1c is 12.0  Continue 100 unit Insulin Drip  5% Dextrose with NaCl drip  Mg q4, Phos q4, CMP q4    Diabetic Foot Ulcer  Pt being seen by Podiatry. Wound is dressed, there is no leakage of blood or pus. Pt still not willing to have an amputation  BCs positive for S aureus  Continue IV 6g Cefazolin    Suspected Bacteremia  Pt still has spiking fevers which have not reduced with Cefazolin  TTE recommended by ID    HLD  Home Atorvastatin 80 mg

## 2020-03-13 NOTE — PLAN OF CARE
VSS. A/Ox4.  No complaints of pain.  Patient did spike temp of 102 twice.  First time was treated with tylenol.  Second time patient refused to take tylenol and asked for something else.  On call notified and order for ibuprofen was placed and given to patient.  Ancef infusing at 20.8 mL/hr.  D51/2NS with 20K infusing at 100 mL/hr.  Insulin gtt infusing.  No acute events overnight. Safety maintained.  All questions answered. Patient updated on plan of care.  Will continue to monitor.

## 2020-03-13 NOTE — PHYSICIAN QUERY
"PT Name: Indio Ryder Jr.  MR #: [id number]         Withdrawn DV- documented on 3/15 PN     Physician Query Form - Renal Condition Clarification     CDS/: Arcenio Harris RN              Contact information:    [email or web address]    This form is a permanent document in the medical record.     QueryDate: March 13, 2020    By submitting this query, we are merely seeking further clarification of documentation. Please utilize your independent clinical judgment when addressing the question(s) below.    The Medical record contains the following:   Indicator Supporting Clinical Findings Location in Medical Record    Kidney (Renal) Insufficiency      Kidney (Renal) Failure / Injury      Nephrotoxic Agents     x BUN/Creatinine GFR 3/10 Cr: 1.4, 1.1  3/11 Cr: 1.1,1.0, 0.9, 1.0, 1.1, 1.0   3/12 Cr: 1.0, 0.9 Labs    "  "     x Urine: Casts         Eosinophils UA: Hyalin casts: 1, Trace ketones, 1+ protein     Dehydration     x Nausea/Vomiting present with chills, N/V and found with DKA 3/12 Progress note- Hospital medicine    Dialysis/CRRT     x Treatment: NaCl, bolus, 1000 ml; given 3/10 MAR     x Other:  His blood cultures on admit are positive with GPCs. He was seen by podiatry 3/5/2020. Bone and wound cultures +MSSA. He was given course of doxycycline. ....Recommendations  1. Continue cefazolin 6g continuous infusion.   2. Podiatry following- plans for bedside debridement as pt refusing surgical amputation. No need for further bone biopsy as had bone cultures obtained  3. Blood cultures on admit +GPCs. Likely MSSA. Continue cefazolin.     He exhibits edema      X-ray of right food shows soft tissue edema concerning for osteomyelitis. He received single dose of vanc/zosyn and 2L IVF.     3/12 Progress note-- infectious disease                    3/12 Progress note-- infectious disease    3/10 H&P   Acute Kidney Injury / Acute Renal Failure has different defining criteria. A generally accepted guideline  is: "   A greater than 100% (2X) rise in serum creatinine from baseline* occurring during the course of a single hospital stay.   *Baseline as determined by the providers judgment and consideration of previous lab values and other documentation, if available.    A diagnosis of Acute Kidney Injury/ Acute Renal Failure should incorporate abnormal labs and clinical findings that are clinically significant      References: 1. Amina et al. Acute renal failure-definition, outcome measures, animal models, fluid therapy and information technology needs: the Second International Consensus Conference of the Acute Dialysis Quality Initiative (ADQI) Group. Crit Care 2004; 8:B204; 2. Juice et al. Acute Kidney Injury Network: report of an initiative to improve outcomes in acute kidney injury. Crit Care 2007; 11:R31; 3. Kidney Disease: Improving Global Outcomes (KDIGO). Acute Kidney Injury Work Group. KDIGO clinical practice guidelines for acute kidney injury. Kidney Int Suppl 2012; 2:1.    The clinical guidelines noted below is only a system guideline, it does not replace the providers clinical judgment.    Provider, please specify the diagnosis or diagnoses associated with above clinical findings.    Provider, please specify renal condition associated with the above clinical findings:     [   ] Other Acute Kidney Failure/Injury (please specify): ____________     [   ] Unspecified Acute Kidney Failure/Injury      [   ] Acute Renal Insufficiency  Consider if SCr rise is transient and normalizes quickly with no efforts at real resuscitation of vital signs and perfusion   [   ] Other (please specify): _________________________________   [   ]  Clinically Undetermined       Please document in your progress notes daily for the duration of treatment until resolved and include in your discharge summary.

## 2020-03-13 NOTE — PLAN OF CARE
03/13/20 1615   Discharge Assessment   Assessment Type Discharge Planning Assessment   Confirmed/corrected address and phone number on facesheet? Yes   Assessment information obtained from? Medical Record   Expected Length of Stay (days) 4   Prior to hospitilization cognitive status: Alert/Oriented   Prior to hospitalization functional status: Independent   Current cognitive status: Alert/Oriented   Current Functional Status: Independent   Lives With other relative(s)   Able to Return to Prior Arrangements yes   Is patient able to care for self after discharge? Yes   Patient's perception of discharge disposition home or selfcare   Equipment Currently Used at Home walker, rolling;shower chair   Does the patient have transportation home? Yes   Transportation Anticipated family or friend will provide   Discharge Plan A Home;Home with family   Discharge Plan B Home   DME Needed Upon Discharge    (TBD)   Patient/Family in Agreement with Plan unable to assess     CM to bedside for d/c assessment. Pt is sleeping soundly. Information obtained from medical record.    Pt was d/c with [facility] outpatient wound care and [facility] for home antibiotics. Sister was able to transport patient.    oLrena Thakur MD.  [phone]    Extended Emergency Contact Information  Primary Emergency Contact: Cynthia White  Work Phone: [phone]  Relation: Sister    Payor: MEDICAID / Plan: [facility] / Product Type: Managed Medicaid /       [facility]  [address]  [place] [address]  Phone: [phone] Fax: [phone]      CM will continue to follow for d/c planning needs.    Julie Haase RN  Case Management [phone]

## 2020-03-13 NOTE — PROGRESS NOTES
[facility] Hospital Medicine  Progress Note    Patient Name: Indio Ryder Jr.  MRN: [id number]  Patient Class: IP- Inpatient   Admission Date: 3/10/2020  Length of Stay: 3 days  Attending Physician: Antonio Valladares MD  Primary Care Provider: Lorena Thakur MD    [facility] Medicine Team: [facility] HOSP MED 5 Vicky Singh MD    Subjective:     Principal Problem:Diabetic ketoacidosis without coma associated with type 2 diabetes mellitus        HPI:  Mr. Ryder is 48 y/o with a history of poorly controlled DM2 ( last Ha1c 8.4 ) , DKA with coma, diabetic neuropathy, diabatic retinopathy and diabetic foot with multiple amputation who presents to ED with chief complaint of chills associated with nausea and vomiting for one day. He sates that yesterday he started to feel ill and he has been having chills associated with nauseas and vomiting. He also endorses poor appetite with decreased oral intake for the last week. Denies fever, abdominal pain, feet or leg pain, leg swelling, CP, SOB, dysuria. He a history of poorly control diabetes with amputation of his right big and 5th toes. He undergone left big toe detriment and imputation on 7/14/2019. He reports not feeling well for the last couple of weeks and he felt about to pass out on Mardi gras and his BP was in 90/50. He was seen by podiatry last  Thursday when he had his wound opened and he was prescribed doxycycline for 10 days.     In ED, he was afebrile, vitally stable. Lab, BS: 382, B-hydroxybutyrate 3.6, HCO3 22,  anion gap 18. WBC: 6.9, lactate: 1.2  X-ray of right food shows soft tissue edema concerning for osteomyelitis. He received single dose of vanc/zosyn and 2L IVF.     Overview/Hospital Course:  Patient with a history of DM2, DKA with coma, osteomyelitis, multiple amputation who present with chills, N/V and found with DKA. Started on insuline gtt and IVF infusion. Pt is currently treated with doxycyline for wound in left foot. ID  and podiatry consulted. MRI with acute osteomyelitis of 4th metatarsal head and neck of left proximal phalanx. ID recommended cefazolin and podiatry plan for for amputation. Pt is refusing amputation but agree to have agressive washout if he keep spiking fever. Pt transitioned to subQ insulin and tolerating PO. Blood ux grwe Staph coagulase negative, TTE done and shows normal valves with no vegetations.     Interval History: pt had spiking fever overnight multiple time, blood cx 3/10 positive for Staph. Pt denies N/V and he feel his symptoms improved and tolerating PO.  Review of Systems   Constitutional: Positive for fatigue. Negative for activity change and fever.   HENT: Negative for congestion, facial swelling, sore throat and trouble swallowing.    Respiratory: Negative for apnea, cough, chest tightness, shortness of breath and wheezing.    Cardiovascular: Positive for leg swelling. Negative for chest pain and palpitations.   Gastrointestinal: Negative for abdominal distention, abdominal pain, blood in stool and diarrhea.   Genitourinary: Negative for difficulty urinating, dysuria, flank pain and hematuria.   Musculoskeletal: Negative for arthralgias, back pain, myalgias and neck pain.   Skin: Negative for color change and rash.   Neurological: Negative for dizziness, weakness, light-headedness, numbness and headaches.   Hematological: Negative for adenopathy.   Psychiatric/Behavioral: Negative for agitation and behavioral problems.     Objective:     Vital Signs (Most Recent):  Temp: 98.3 °F (36.8 °C) (03/13/20 1128)  Pulse: 90 (03/13/20 1300)  Resp: 17 (03/13/20 1128)  BP: (!) 143/88 (03/13/20 1300)  SpO2: (!) 93 % (03/13/20 1128) Vital Signs (24h Range):  Temp:  [98.3 °F (36.8 °C)-102.2 °F (39 °C)] 98.3 °F (36.8 °C)  Pulse:  [] 90  Resp:  [16-18] 17  SpO2:  [93 %-95 %] 93 %  BP: (117-190)/(61-99) 143/88     Weight: 94.8 kg (209 lb)  Body mass index is 27.57 kg/m².    Intake/Output Summary (Last 24  hours) at 3/13/2020 1527  Last data filed at 3/13/2020 0600  Gross per 24 hour   Intake 1518.8 ml   Output 1050 ml   Net 468.8 ml      Physical Exam   Constitutional: He is oriented to person, place, and time. He appears well-developed and well-nourished. No distress.   Looks well     HENT:   Head: Normocephalic and atraumatic.   Eyes: Pupils are equal, round, and reactive to light. Conjunctivae and EOM are normal.   Neck: Normal range of motion. Neck supple.   Cardiovascular: Normal rate, regular rhythm and normal heart sounds.   No murmur heard.  Pulmonary/Chest: Effort normal and breath sounds normal. No respiratory distress. He has no wheezes. He has no rales.   Abdominal: Soft. Bowel sounds are normal. He exhibits no distension. There is no tenderness.   Musculoskeletal: He exhibits no edema or deformity.   Lymphadenopathy:     He has no cervical adenopathy.   Neurological: He is alert and oriented to person, place, and time. He displays normal reflexes. No cranial nerve deficit or sensory deficit. He exhibits normal muscle tone.   Skin: Skin is warm. Capillary refill takes less than 2 seconds. He is not diaphoretic.   Psychiatric: He has a normal mood and affect. His behavior is normal.       Significant Labs:   Blood Culture:   Recent Labs   Lab 03/12/20 2108   LABBLOO No Growth to date     CBC:   Recent Labs   Lab 03/12/20  0436 03/13/20  0621   WBC 4.58 5.70   HGB 10.6* 10.4*   HCT 35.3* 34.4*    222     CMP:   Recent Labs   Lab 03/12/20  1938 03/13/20  0012 03/13/20  0621    138 139   K 4.4 4.0 3.3*    103 103   CO2 29 27 27   * 368* 231*   BUN 3* 4* 5*   CREATININE 1.1 1.0 0.9   CALCIUM 8.0* 7.6* 7.7*   PROT 7.3 6.4 6.6   ALBUMIN 2.2* 1.8* 1.9*   BILITOT 0.3 0.3 0.3   ALKPHOS 70 65 62   AST 16 17 17   ALT 6* 5* <5*   ANIONGAP 6* 8 9   EGFRNONAA >60.0 >60.0 >60.0       Significant Imaging: I have reviewed all pertinent imaging results/findings within the past 24  hours.      Assessment/Plan:      * Diabetic ketoacidosis without coma associated with type 2 diabetes mellitus  48 y/o with a history of poorly controlled DM2 ,diabetic neuropathy, diabatic retinopathy and diabetic foot with multiple amputation who presents to ED with chief complaint of chills associated with nausea and vomiting for one day.   He has a history of foot ulcer of left big toe who currently treated with doxycycline.    -- afebrile, vitally stable.  -- Lab, BS: 382, B-hydroxybutyrate 3.6, HCO3 22,  anion gap 18.  -- WBC: 6.9, lactate: 1.2  -- X-ray of right food shows soft tissue edema concerning for osteomyelitis  -- last Ha1c 8.4  -- wound cx 3/5 positive for staph aureus     Plan :  -- started on insuline gtt   -- started on IVF infusion 100cc/h  -- CMP q4, Phos q4   -- replete electrolyte   -- blood cx drawn, follow up   -- AG closed and BS now in low 200s  -- Ha1c 12  --  transitioned to subQ insulin   -- pt tolerating PO.       Diabetic ulcer of left midfoot associated with type 2 diabetes mellitus, with bone involvement without evidence of necrosis  -- Pt has a hx of poorly controled DM   -- hx of diabetic ulcer with multiple amputations   -- currently treated with doxycyline   -- wound cx positive for S. aureus    --  Blood cx grew staph catalase negative.    Plan :   -- consulted podiatry, appreciate help   -- MRI with acute osteomyelitis of 4th metatarsal head and neck of left proximal phalanx  -- podiatry plan for amputation; ; patient hesitant    -- ID recommend cefazolin. 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 edema, with long-term current use of insulin  See DKA     Mixed hyperlipidemia    - continue home Atorvastatin 80 mg      Essential hypertension    - pt was started on losartan   - it  was stopped because of hypotension episode   - keep holding       VTE Risk Mitigation (From admission, onward)         Ordered     heparin (porcine) injection 5,000 Units  Every 12 hours      03/12/20 1648     Place TEN hose  Until discontinued      03/10/20 1150     IP VTE LOW RISK PATIENT  Once      03/10/20 1150                      Vicky Singh MD  Department of Hospital Medicine   [facility]

## 2020-03-13 NOTE — SUBJECTIVE & OBJECTIVE
Interval History:   Repeat blood cultures are NGTD  Continues to have fevers. Feels much improved  No acute complaints or concerns  Bedside debridement yesterday. Wound cultures obtained per podiatry  Blood cultures on admit +coag negative Staph    Review of Systems   Constitutional: Positive for fatigue and fever. Negative for activity change, appetite change, chills and diaphoresis.   Respiratory: Negative for cough and shortness of breath.    Cardiovascular: Positive for leg swelling.   Gastrointestinal: Positive for nausea. Negative for abdominal pain, constipation, diarrhea and vomiting.   Genitourinary: Negative for dysuria.   Musculoskeletal: Negative for back pain.   Skin: Positive for wound. Negative for color change, pallor and rash.   Neurological: Negative for dizziness, light-headedness and headaches.   All other systems reviewed and are negative.    Objective:     Vital Signs (Most Recent):  Temp: 98.3 °F (36.8 °C) (03/13/20 1128)  Pulse: 90 (03/13/20 1300)  Resp: 17 (03/13/20 1128)  BP: (!) 143/88 (03/13/20 1300)  SpO2: (!) 93 % (03/13/20 1128) Vital Signs (24h Range):  Temp:  [98.3 °F (36.8 °C)-102.2 °F (39 °C)] 98.3 °F (36.8 °C)  Pulse:  [] 90  Resp:  [16-18] 17  SpO2:  [93 %-95 %] 93 %  BP: (117-190)/(61-99) 143/88     Weight: 94.8 kg (209 lb)  Body mass index is 27.57 kg/m².    Estimated Creatinine Clearance: 109.7 mL/min (based on SCr of 0.9 mg/dL).    Physical Exam   Constitutional: He appears well-developed and well-nourished. No distress.   HENT:   Head: Normocephalic.   Eyes: Pupils are equal, round, and reactive to light.   Cardiovascular: Normal rate, regular rhythm, normal heart sounds and intact distal pulses.   No murmur heard.  Pulmonary/Chest: Effort normal. No stridor. No respiratory distress. He has no wheezes.   Abdominal: Soft. He exhibits no distension. There is no tenderness.   Musculoskeletal: Normal range of motion. He exhibits edema. He exhibits no tenderness or  deformity.   Neurological: He is alert.   Skin: Skin is warm and dry. He is not diaphoretic. There is erythema.   Psychiatric: He has a normal mood and affect.   Vitals reviewed.      Significant Labs: All pertinent labs within the past 24 hours have been reviewed.    Significant Imaging: I have reviewed all pertinent imaging results/findings within the past 24 hours.

## 2020-03-13 NOTE — ASSESSMENT & PLAN NOTE
48 y/o with a history of poorly controlled DM2 ,diabetic neuropathy, diabatic retinopathy and diabetic foot with multiple amputation who presents to ED with chief complaint of chills associated with nausea and vomiting for one day.   He has a history of foot ulcer of left big toe who currently treated with doxycycline.    -- afebrile, vitally stable.  -- Lab, BS: 382, B-hydroxybutyrate 3.6, HCO3 22,  anion gap 18.  -- WBC: 6.9, lactate: 1.2  -- X-ray of right food shows soft tissue edema concerning for osteomyelitis  -- last Ha1c 8.4  -- wound cx 3/5 positive for staph aureus     Plan :  -- started on insuline gtt   -- started on IVF infusion 100cc/h  -- CMP q4, Phos q4   -- replete electrolyte   -- blood cx drawn, follow up   -- AG closed and BS now in low 200s  -- Ha1c 12  --  transitioned to subQ insulin   -- pt tolerating PO.

## 2020-03-13 NOTE — ASSESSMENT & PLAN NOTE
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 His blood cultures on admit are positive with GPCs. He was seen by podiatry 3/5/2020. Bone and wound cultures +MSSA. He was given course of doxycycline.     MRI studies confirms acute osteomyelitis of the 4th metatarsal head and neck and proximal phalanx.  No rim enhancing fluid collection or drainable abscess.  ALISSA studies wnl. Seen by podiatry and plans to undergo bedside debridement. Pt refusing amputation at this time. Blood cultures on admit are positive with coag negative staph. Repeat blood cultures are NGTD. Underwent debridement with podiatry at bedside. Deep wound cultures obtained. Continues to be febrile.        Recommendations  1. Continue cefazolin 6g continuous infusion.   2. Underwent bedside debridement 3/12/2020. Deep wound cultures obtained  3. Blood cultures on admit +coag negative staph. Repeat blood cultures are NGTD. TTE negative.   4. Will need 6 weeks of IV abx from date of bedside I&D (estimated end date 4/23/2020)   5. ID will follow closely with you

## 2020-03-13 NOTE — ASSESSMENT & PLAN NOTE
Indio Ryder Jr. is a 49 y.o. male with bacteremia, fever, positive bone biopsy for osteo. S/p bedside debridement.  Spiking fever this am, but nausea and vomiting improving.    -washed wound out at bedside.  Discussed surgical options again with patient.  Stated that if he continues to have fevers that we will need to take him to the OR at least for a more aggressive washout.  Patient in agreement.  -recommend continuing IV antibiotic, ID on board, appreciate Recs  MRI left forefoot demonstrating acute osteomyelitis of the 4th metatarsal head and neck and proximal phalanx.  No rim enhancing fluid collection or drainable abscess.  Currently no plans for surgical intervention.  Will continue to monitor.    Nursing to do daily betadin WTD dressing changes.

## 2020-03-13 NOTE — SUBJECTIVE & OBJECTIVE
Interval Hx:  S/p bedside debridement 3/12 per Dr. Erickson Patient Seen at bedside for dressing change.  Patient spiking a fever this morning, but says his nausea and vomiting have improved.  Dressings clean, dry, and intact.    Scheduled Meds:   acetaminophen  650 mg Oral Once    atorvastatin  80 mg Oral Daily    ceFAZolin(ANCEF) in D5W 500 mL CONTINUOUS INFUSION  6 g Intravenous Q24H    gabapentin  600 mg Oral Daily    heparin (porcine)  5,000 Units Subcutaneous Q12H    hydroxyzine HCL  50 mg Oral Once    insulin aspart U-100  5 Units Subcutaneous TIDWM    insulin detemir U-100  15 Units Subcutaneous Daily    losartan  12.5 mg Oral Daily    ondansetron  4 mg Intravenous Q8H    potassium phosphate IVPB  20 mmol Intravenous Once     Continuous Infusions:    PRN Meds:acetaminophen, Dextrose 10% Bolus, Dextrose 10% Bolus, glucagon (human recombinant), glucose, glucose, hydrALAZINE, ibuprofen, insulin aspart U-100, ondansetron, promethazine (PHENERGAN) IVPB, sodium chloride 0.9%    Review of patient's allergies indicates:  No Known Allergies     Past Medical History:   Diagnosis Date    Actinomyces infection 1/17/2017    Right foot    Diabetic ketoacidosis without coma associated with type 2 diabetes mellitus 5/30/2017    Diabetic ulcer of right foot associated with type 2 diabetes mellitus 6/3/2015    Essential hypertension 6/6/2013    Group B streptococcal infection 1/13/2017    Mixed hyperlipidemia 8/12/2014    Shoulder impingement 8/12/2014    Subacute osteomyelitis of right foot 1/12/2017    Streptococcus agalactiae, Actinomyces odontolyticus    Traumatic amputation of fifth toe of right foot 07/02/2015    Type 2 diabetes mellitus with diabetic neuropathy, with long-term current use of insulin 5/3/2016    Ulcerative colitis, unspecified      Past Surgical History:   Procedure Laterality Date    DEBRIDEMENT OF FOOT Left 7/14/2019    Procedure: DEBRIDEMENT, FOOT, with left 5th ray amputation;   Surgeon: Sis Hickman DPM;  Location: [facility] OR [address] [place] [address];  Service: Podiatry;  Laterality: Left;    DEBRIDEMENT OF FOOT Left 7/17/2019    Procedure: DEBRIDEMENT, FOOT with leftt 5th ray partial amputation with Neox Graft;  Surgeon: Mai Burrell DPM;  Location: [facility] OR [address] [place] [address];  Service: Podiatry;  Laterality: Left;  t    TOE AMPUTATION Right 06/05/2015    TOE AMPUTATION Right 01/19/2017       Family History     Problem Relation (Age of Onset)    Cancer Mother    Cataracts Father    Diabetes Mother, Sister    Hypertension Mother, Father, Maternal Aunt    No Known Problems Brother, Sister, Sister, Maternal Uncle, Paternal Aunt, Paternal Uncle, Maternal Grandmother, Maternal Grandfather, Paternal Grandmother, Paternal Grandfather        Tobacco Use    Smoking status: Never Smoker    Smokeless tobacco: Never Used   Substance and Sexual Activity    Alcohol use: No    Drug use: No    Sexual activity: Yes     Partners: Female     Birth control/protection: Condom     Review of Systems   Constitutional: Positive for fever. Negative for appetite change, chills and fatigue.   Gastrointestinal: Positive for nausea. Negative for diarrhea and vomiting.   Musculoskeletal: Negative for arthralgias, joint swelling and myalgias.   Skin: Positive for wound.   Neurological: Positive for weakness and numbness.   Psychiatric/Behavioral: Negative for behavioral problems and confusion.     Objective:     Vital Signs (Most Recent):  Temp: 98.3 °F (36.8 °C) (03/13/20 1128)  Pulse: 90 (03/13/20 1300)  Resp: 17 (03/13/20 1128)  BP: (!) 143/88 (03/13/20 1300)  SpO2: (!) 93 % (03/13/20 1128) Vital Signs (24h Range):  Temp:  [98.3 °F (36.8 °C)-102.2 °F (39 °C)] 98.3 °F (36.8 °C)  Pulse:  [] 90  Resp:  [16-18] 17  SpO2:  [93 %-95 %] 93 %  BP: (117-190)/(61-99) 143/88     Weight: 94.8 kg (209 lb)  Body mass index is 27.57 kg/m².    Foot Exam    General  Orientation: alert and oriented to person, place, and time   Affect:  appropriate       Right Foot/Ankle     Inspection and Palpation  Ecchymosis: none  Tenderness: none   Swelling: none   Skin Exam: skin intact;     Neurovascular  Dorsalis pedis: 1+  Posterior tibial: 1+  Saphenous nerve sensation: diminished  Tibial nerve sensation: diminished  Superficial peroneal nerve sensation: diminished  Deep peroneal nerve sensation: diminished  Sural nerve sensation: diminished    Comments  Amputation of 1,5 digits    Left Foot/Ankle      Inspection and Palpation  Ecchymosis: none  Tenderness: none   Swelling: none   Skin Exam: drainage and ulcer;     Neurovascular  Dorsalis pedis: 1+  Posterior tibial: 1+  Saphenous nerve sensation: diminished  Tibial nerve sensation: diminished  Superficial peroneal nerve sensation: diminished  Deep peroneal nerve sensation: diminished  Sural nerve sensation: diminished    Comments  Partial 5th ray amputation      Laboratory:  A1C:   Recent Labs   Lab 12/03/19  1019 03/12/20  1224   HGBA1C 8.4* 12.0*     CBC:   Recent Labs   Lab 03/13/20  0621   WBC 5.70   RBC 3.65*   HGB 10.4*   HCT 34.4*      MCV 94   MCH 28.5   MCHC 30.2*     CMP:   Recent Labs   Lab 03/13/20  0621   *   CALCIUM 7.7*   ALBUMIN 1.9*   PROT 6.6      K 3.3*   CO2 27      BUN 5*   CREATININE 0.9   ALKPHOS 62   ALT <5*   AST 17   BILITOT 0.3     CRP:   No results for input(s): CRP in the last 168 hours.  ESR:   No results for input(s): SEDRATE in the last 168 hours.  Wound Cultures:   Recent Labs   Lab 03/05/20  1611 03/05/20  1612   LABAERO STAPHYLOCOCCUS AUREUS  Many  Skin janes also present  * STAPHYLOCOCCUS AUREUS  Moderate  Skin janes also present  *     Microbiology Results (last 7 days)     Procedure Component Value Units Date/Time    Blood culture [[id number]]  (Abnormal) Collected:  03/10/20 1213    Order Status:  Completed Specimen:  Blood from Peripheral, Wrist, Right Updated:  03/13/20 0916     Blood Culture, Routine Gram stain aer bottle: Gram positive  cocci in clusters resembling Staph       Results called to and read back by: Erma Daniel RN 03/11/2020        14:54      Gram stain mary bottle: Gram positive cocci in clusters resembling Staph      COAGULASE-NEGATIVE STAPHYLOCOCCUS SPECIES  Organism is a probable contaminant      Blood culture [[id number]] Collected:  03/12/20 2108    Order Status:  Completed Specimen:  Blood Updated:  03/13/20 0515     Blood Culture, Routine No Growth to date    Culture, Anaerobe [[id number]] Collected:  03/12/20 1726    Order Status:  Sent Specimen:  Wound from Foot, Left Updated:  03/12/20 2123    Aerobic culture [[id number]] Collected:  03/12/20 1726    Order Status:  Sent Specimen:  Wound from Foot, Left Updated:  03/12/20 2122    Blood culture [[id number]] Collected:  03/10/20 1057    Order Status:  Completed Specimen:  Blood from Peripheral, Antecubital, Left Updated:  03/12/20 1612     Blood Culture, Routine No Growth to date      No Growth to date      No Growth to date    Blood culture [[id number]] Collected:  03/11/20 1156    Order Status:  Completed Specimen:  Blood Updated:  03/12/20 1412     Blood Culture, Routine No Growth to date      No Growth to date        Specimen (12h ago, onward)    None          Diagnostic Results:  I have reviewed all pertinent imaging results/findings within the past 24 hours.   Imaging Results          X-Ray Foot Complete Left (Final result)  Result time 03/10/20 11:22:03    Final result by Vinnie Cortés III, MD (03/10/20 11:22:03)                 Narrative:    EXAMINATION:  XR FOOT COMPLETE 3 VIEW LEFT    CLINICAL HISTORY:  Non-pressure chronic ulcer of other part of left foot with unspecified severity    FINDINGS:  No fracture dislocation seen.  There is amputation of the 5th digit and partial amputation of the distal 5th metatarsal.  There is soft tissue swelling.  Osteomyelitis of the distal aspect of the remaining 5th metatarsal could not be excluded.  MRI could be  helpful.      Electronically signed by: Vinnie Cortés MD  Date:    03/10/2020  Time:    11:22                                Clinical Findings:  Left foot ulcer  Location: Plantar 3rd/4th metatarsal head  Wound base: 20% granular; 80% fibrotic.  Periwound: viable  Drainage: purulent  Signs of infection: +probes to bone, no mal odor or purulent drainage.  Wound tracks at 9 o clock to 3rd metatarsal head and distally at 12 o clock to 4th toe. No erythema, no edema.    3/13

## 2020-03-13 NOTE — ASSESSMENT & PLAN NOTE
-- Pt has a hx of poorly controled DM   -- hx of diabetic ulcer with multiple amputations   -- currently treated with doxycyline   -- wound cx positive for S. aureus    --  Blood cx grew staph catalase negative.    Plan :   -- consulted podiatry, appreciate help   -- MRI with acute osteomyelitis of 4th metatarsal head and neck of left proximal phalanx  -- podiatry plan for amputation; ; patient hesitant    -- ID recommend cefazolin. vancomycin held by ID  -- pt still hesitant regarding amputation, he agree with aggressive washout if keep spiking fever

## 2020-03-14 LAB
ADENOVIRUS: NOT DETECTED
ALBUMIN SERPL BCP-MCNC: 1.8 G/DL (ref 3.5–5.2)
ALP SERPL-CCNC: 65 U/L (ref 55–135)
ALT SERPL W/O P-5'-P-CCNC: <5 U/L (ref 10–44)
ANION GAP SERPL CALC-SCNC: 8 MMOL/L (ref 8–16)
ANION GAP SERPL CALC-SCNC: 9 MMOL/L (ref 8–16)
AST SERPL-CCNC: 17 U/L (ref 10–40)
BASOPHILS # BLD AUTO: 0.01 K/UL (ref 0–0.2)
BASOPHILS NFR BLD: 0.1 % (ref 0–1.9)
BILIRUB SERPL-MCNC: 0.3 MG/DL (ref 0.1–1)
BORDETELLA PARAPERTUSSIS (IS1001): NOT DETECTED
BORDETELLA PERTUSSIS (PTXP): NOT DETECTED
BUN SERPL-MCNC: 7 MG/DL (ref 6–20)
BUN SERPL-MCNC: 8 MG/DL (ref 6–20)
CALCIUM SERPL-MCNC: 7.7 MG/DL (ref 8.7–10.5)
CALCIUM SERPL-MCNC: 7.8 MG/DL (ref 8.7–10.5)
CHLAMYDIA PNEUMONIAE: NOT DETECTED
CHLORIDE SERPL-SCNC: 99 MMOL/L (ref 95–110)
CHLORIDE SERPL-SCNC: 99 MMOL/L (ref 95–110)
CO2 SERPL-SCNC: 27 MMOL/L (ref 23–29)
CO2 SERPL-SCNC: 29 MMOL/L (ref 23–29)
CORONAVIRUS 229E, COMMON COLD VIRUS: NOT DETECTED
CORONAVIRUS HKU1, COMMON COLD VIRUS: NOT DETECTED
CORONAVIRUS NL63, COMMON COLD VIRUS: NOT DETECTED
CORONAVIRUS OC43, COMMON COLD VIRUS: NOT DETECTED
CREAT SERPL-MCNC: 0.9 MG/DL (ref 0.5–1.4)
CREAT SERPL-MCNC: 1 MG/DL (ref 0.5–1.4)
CRP SERPL-MCNC: 161.7 MG/L (ref 0–8.2)
DIFFERENTIAL METHOD: ABNORMAL
EOSINOPHIL # BLD AUTO: 0 K/UL (ref 0–0.5)
EOSINOPHIL NFR BLD: 0 % (ref 0–8)
ERYTHROCYTE [DISTWIDTH] IN BLOOD BY AUTOMATED COUNT: 12.6 % (ref 11.5–14.5)
EST. GFR  (AFRICAN AMERICAN): >60 ML/MIN/1.73 M^2
EST. GFR  (AFRICAN AMERICAN): >60 ML/MIN/1.73 M^2
EST. GFR  (NON AFRICAN AMERICAN): >60 ML/MIN/1.73 M^2
EST. GFR  (NON AFRICAN AMERICAN): >60 ML/MIN/1.73 M^2
FLUBV RNA NPH QL NAA+NON-PROBE: NOT DETECTED
GLUCOSE SERPL-MCNC: 184 MG/DL (ref 70–110)
GLUCOSE SERPL-MCNC: 234 MG/DL (ref 70–110)
HCT VFR BLD AUTO: 33.2 % (ref 40–54)
HGB BLD-MCNC: 10.1 G/DL (ref 14–18)
HPIV1 RNA NPH QL NAA+NON-PROBE: NOT DETECTED
HPIV2 RNA NPH QL NAA+NON-PROBE: NOT DETECTED
HPIV3 RNA NPH QL NAA+NON-PROBE: NOT DETECTED
HPIV4 RNA NPH QL NAA+NON-PROBE: NOT DETECTED
HUMAN METAPNEUMOVIRUS: NOT DETECTED
IMM GRANULOCYTES # BLD AUTO: 0.04 K/UL (ref 0–0.04)
IMM GRANULOCYTES NFR BLD AUTO: 0.5 % (ref 0–0.5)
INFLUENZA A (SUBTYPES H1,H1-2009,H3): NOT DETECTED
INFLUENZA A, MOLECULAR: NEGATIVE
INFLUENZA B, MOLECULAR: NEGATIVE
LACTATE SERPL-SCNC: 1.2 MMOL/L (ref 0.5–2.2)
LYMPHOCYTES # BLD AUTO: 1.4 K/UL (ref 1–4.8)
LYMPHOCYTES NFR BLD: 18.9 % (ref 18–48)
MAGNESIUM SERPL-MCNC: 1.3 MG/DL (ref 1.6–2.6)
MCH RBC QN AUTO: 29.3 PG (ref 27–31)
MCHC RBC AUTO-ENTMCNC: 30.4 G/DL (ref 32–36)
MCV RBC AUTO: 96 FL (ref 82–98)
MONOCYTES # BLD AUTO: 0.2 K/UL (ref 0.3–1)
MONOCYTES NFR BLD: 3.1 % (ref 4–15)
MYCOPLASMA PNEUMONIAE: NOT DETECTED
NEUTROPHILS # BLD AUTO: 5.7 K/UL (ref 1.8–7.7)
NEUTROPHILS NFR BLD: 77.4 % (ref 38–73)
NRBC BLD-RTO: 0 /100 WBC
PLATELET # BLD AUTO: 185 K/UL (ref 150–350)
PMV BLD AUTO: 11.6 FL (ref 9.2–12.9)
POCT GLUCOSE: 148 MG/DL (ref 70–110)
POCT GLUCOSE: 169 MG/DL (ref 70–110)
POCT GLUCOSE: 212 MG/DL (ref 70–110)
POCT GLUCOSE: 245 MG/DL (ref 70–110)
POTASSIUM SERPL-SCNC: 3.2 MMOL/L (ref 3.5–5.1)
POTASSIUM SERPL-SCNC: 3.4 MMOL/L (ref 3.5–5.1)
PROCALCITONIN SERPL IA-MCNC: 0.19 NG/ML
PROT SERPL-MCNC: 6.8 G/DL (ref 6–8.4)
RBC # BLD AUTO: 3.45 M/UL (ref 4.6–6.2)
RESPIRATORY INFECTION PANEL SOURCE: NORMAL
RSV RNA NPH QL NAA+NON-PROBE: NOT DETECTED
RV+EV RNA NPH QL NAA+NON-PROBE: NOT DETECTED
SODIUM SERPL-SCNC: 134 MMOL/L (ref 136–145)
SODIUM SERPL-SCNC: 137 MMOL/L (ref 136–145)
SPECIMEN SOURCE: NORMAL
WBC # BLD AUTO: 7.32 K/UL (ref 3.9–12.7)

## 2020-03-14 PROCEDURE — 25000003 PHARM REV CODE 250: Performed by: STUDENT IN AN ORGANIZED HEALTH CARE EDUCATION/TRAINING PROGRAM

## 2020-03-14 PROCEDURE — 87502 INFLUENZA DNA AMP PROBE: CPT

## 2020-03-14 PROCEDURE — 63600175 PHARM REV CODE 636 W HCPCS: Performed by: STUDENT IN AN ORGANIZED HEALTH CARE EDUCATION/TRAINING PROGRAM

## 2020-03-14 PROCEDURE — 85025 COMPLETE CBC W/AUTO DIFF WBC: CPT

## 2020-03-14 PROCEDURE — 87040 BLOOD CULTURE FOR BACTERIA: CPT

## 2020-03-14 PROCEDURE — 99233 SBSQ HOSP IP/OBS HIGH 50: CPT | Mod: ,,, | Performed by: PODIATRIST

## 2020-03-14 PROCEDURE — 36415 COLL VENOUS BLD VENIPUNCTURE: CPT

## 2020-03-14 PROCEDURE — 86140 C-REACTIVE PROTEIN: CPT

## 2020-03-14 PROCEDURE — 99233 PR SUBSEQUENT HOSPITAL CARE,LEVL III: ICD-10-PCS | Mod: ,,, | Performed by: STUDENT IN AN ORGANIZED HEALTH CARE EDUCATION/TRAINING PROGRAM

## 2020-03-14 PROCEDURE — 99024 PR POST-OP FOLLOW-UP VISIT: ICD-10-PCS | Mod: ,,, | Performed by: PODIATRIST

## 2020-03-14 PROCEDURE — 20600001 HC STEP DOWN PRIVATE ROOM

## 2020-03-14 PROCEDURE — 84145 PROCALCITONIN (PCT): CPT

## 2020-03-14 PROCEDURE — 99233 PR SUBSEQUENT HOSPITAL CARE,LEVL III: ICD-10-PCS | Mod: ,,, | Performed by: PODIATRIST

## 2020-03-14 PROCEDURE — 99233 SBSQ HOSP IP/OBS HIGH 50: CPT | Mod: ,,, | Performed by: PHYSICIAN ASSISTANT

## 2020-03-14 PROCEDURE — 99233 SBSQ HOSP IP/OBS HIGH 50: CPT | Mod: ,,, | Performed by: STUDENT IN AN ORGANIZED HEALTH CARE EDUCATION/TRAINING PROGRAM

## 2020-03-14 PROCEDURE — 87633 RESP VIRUS 12-25 TARGETS: CPT

## 2020-03-14 PROCEDURE — C9399 UNCLASSIFIED DRUGS OR BIOLOG: HCPCS | Performed by: STUDENT IN AN ORGANIZED HEALTH CARE EDUCATION/TRAINING PROGRAM

## 2020-03-14 PROCEDURE — 83605 ASSAY OF LACTIC ACID: CPT

## 2020-03-14 PROCEDURE — 83735 ASSAY OF MAGNESIUM: CPT

## 2020-03-14 PROCEDURE — 99024 POSTOP FOLLOW-UP VISIT: CPT | Mod: ,,, | Performed by: PODIATRIST

## 2020-03-14 PROCEDURE — 99233 PR SUBSEQUENT HOSPITAL CARE,LEVL III: ICD-10-PCS | Mod: ,,, | Performed by: PHYSICIAN ASSISTANT

## 2020-03-14 PROCEDURE — 80053 COMPREHEN METABOLIC PANEL: CPT

## 2020-03-14 PROCEDURE — 87070 CULTURE OTHR SPECIMN AEROBIC: CPT

## 2020-03-14 PROCEDURE — 87205 SMEAR GRAM STAIN: CPT

## 2020-03-14 RX ORDER — SODIUM,POTASSIUM PHOSPHATES 280-250MG
1 POWDER IN PACKET (EA) ORAL ONCE
Status: COMPLETED | OUTPATIENT
Start: 2020-03-14 | End: 2020-03-14

## 2020-03-14 RX ORDER — POTASSIUM CHLORIDE 7.45 MG/ML
10 INJECTION INTRAVENOUS
Status: COMPLETED | OUTPATIENT
Start: 2020-03-14 | End: 2020-03-14

## 2020-03-14 RX ORDER — MAGNESIUM SULFATE HEPTAHYDRATE 40 MG/ML
2 INJECTION, SOLUTION INTRAVENOUS
Status: COMPLETED | OUTPATIENT
Start: 2020-03-14 | End: 2020-03-15

## 2020-03-14 RX ORDER — LANOLIN ALCOHOL/MO/W.PET/CERES
400 CREAM (GRAM) TOPICAL DAILY
Status: DISCONTINUED | OUTPATIENT
Start: 2020-03-15 | End: 2020-03-16

## 2020-03-14 RX ADMIN — POTASSIUM CHLORIDE 10 MEQ: 10 INJECTION, SOLUTION INTRAVENOUS at 02:03

## 2020-03-14 RX ADMIN — PIPERACILLIN AND TAZOBACTAM 4.5 G: 4; .5 INJECTION, POWDER, LYOPHILIZED, FOR SOLUTION INTRAVENOUS; PARENTERAL at 10:03

## 2020-03-14 RX ADMIN — GABAPENTIN 600 MG: 300 CAPSULE ORAL at 08:03

## 2020-03-14 RX ADMIN — INSULIN ASPART 5 UNITS: 100 INJECTION, SOLUTION INTRAVENOUS; SUBCUTANEOUS at 11:03

## 2020-03-14 RX ADMIN — POTASSIUM CHLORIDE 10 MEQ: 10 INJECTION, SOLUTION INTRAVENOUS at 03:03

## 2020-03-14 RX ADMIN — ATORVASTATIN CALCIUM 80 MG: 20 TABLET, FILM COATED ORAL at 08:03

## 2020-03-14 RX ADMIN — PIPERACILLIN AND TAZOBACTAM 4.5 G: 4; .5 INJECTION, POWDER, LYOPHILIZED, FOR SOLUTION INTRAVENOUS; PARENTERAL at 06:03

## 2020-03-14 RX ADMIN — ONDANSETRON 4 MG: 2 INJECTION INTRAMUSCULAR; INTRAVENOUS at 01:03

## 2020-03-14 RX ADMIN — PIPERACILLIN AND TAZOBACTAM 4.5 G: 4; .5 INJECTION, POWDER, LYOPHILIZED, FOR SOLUTION INTRAVENOUS; PARENTERAL at 04:03

## 2020-03-14 RX ADMIN — INSULIN DETEMIR 15 UNITS: 100 INJECTION, SOLUTION SUBCUTANEOUS at 09:03

## 2020-03-14 RX ADMIN — ACETAMINOPHEN 650 MG: 325 TABLET ORAL at 09:03

## 2020-03-14 RX ADMIN — INSULIN ASPART 2 UNITS: 100 INJECTION, SOLUTION INTRAVENOUS; SUBCUTANEOUS at 07:03

## 2020-03-14 RX ADMIN — SODIUM CHLORIDE, SODIUM LACTATE, POTASSIUM CHLORIDE, AND CALCIUM CHLORIDE 1000 ML: .6; .31; .03; .02 INJECTION, SOLUTION INTRAVENOUS at 02:03

## 2020-03-14 RX ADMIN — HEPARIN SODIUM 5000 UNITS: 5000 INJECTION, SOLUTION INTRAVENOUS; SUBCUTANEOUS at 09:03

## 2020-03-14 RX ADMIN — MAGNESIUM SULFATE HEPTAHYDRATE 2 G: 40 INJECTION, SOLUTION INTRAVENOUS at 09:03

## 2020-03-14 RX ADMIN — HEPARIN SODIUM 5000 UNITS: 5000 INJECTION, SOLUTION INTRAVENOUS; SUBCUTANEOUS at 08:03

## 2020-03-14 RX ADMIN — INSULIN ASPART 5 UNITS: 100 INJECTION, SOLUTION INTRAVENOUS; SUBCUTANEOUS at 07:03

## 2020-03-14 RX ADMIN — POTASSIUM & SODIUM PHOSPHATES POWDER PACK 280-160-250 MG 1 PACKET: 280-160-250 PACK at 06:03

## 2020-03-14 RX ADMIN — INSULIN DETEMIR 15 UNITS: 100 INJECTION, SOLUTION SUBCUTANEOUS at 08:03

## 2020-03-14 RX ADMIN — MAGNESIUM SULFATE HEPTAHYDRATE 2 G: 40 INJECTION, SOLUTION INTRAVENOUS at 11:03

## 2020-03-14 RX ADMIN — VANCOMYCIN HYDROCHLORIDE 2250 MG: 1.25 INJECTION, POWDER, LYOPHILIZED, FOR SOLUTION INTRAVENOUS at 09:03

## 2020-03-14 RX ADMIN — LOSARTAN POTASSIUM 12.5 MG: 25 TABLET, FILM COATED ORAL at 08:03

## 2020-03-14 RX ADMIN — POTASSIUM CHLORIDE 10 MEQ: 10 INJECTION, SOLUTION INTRAVENOUS at 05:03

## 2020-03-14 NOTE — PROGRESS NOTES
[facility] Hospital Medicine  Progress Note    Patient Name: Indio Ryder Jr.  MRN: [id number]  Patient Class: IP- Inpatient   Admission Date: 3/10/2020  Length of Stay: 4 days  Attending Physician: Antonio Valladares MD  Primary Care Provider: Lorena Thakur MD    [facility] Medicine Team: [facility] HOSP MED 5 Vicky Singh MD    Subjective:     Principal Problem:Diabetic ketoacidosis without coma associated with type 2 diabetes mellitus        HPI:  Mr. Ryder is 50 y/o with a history of poorly controlled DM2 ( last Ha1c 8.4 ) , DKA with coma, diabetic neuropathy, diabatic retinopathy and diabetic foot with multiple amputation who presents to ED with chief complaint of chills associated with nausea and vomiting for one day. He sates that yesterday he started to feel ill and he has been having chills associated with nauseas and vomiting. He also endorses poor appetite with decreased oral intake for the last week. Denies fever, abdominal pain, feet or leg pain, leg swelling, CP, SOB, dysuria. He a history of poorly control diabetes with amputation of his right big and 5th toes. He undergone left big toe detriment and imputation on 7/14/2019. He reports not feeling well for the last couple of weeks and he felt about to pass out on Mardi gras and his BP was in 90/50. He was seen by podiatry last  Thursday when he had his wound opened and he was prescribed doxycycline for 10 days.     In ED, he was afebrile, vitally stable. Lab, BS: 382, B-hydroxybutyrate 3.6, HCO3 22,  anion gap 18. WBC: 6.9, lactate: 1.2  X-ray of right food shows soft tissue edema concerning for osteomyelitis. He received single dose of vanc/zosyn and 2L IVF.     Overview/Hospital Course:  Patient with a history of DM2, DKA with coma, osteomyelitis, multiple amputation who present with chills, N/V and found with DKA. Started on insuline gtt and IVF infusion. Pt is currently treated with doxycyline for wound in left foot. ID  and podiatry consulted. MRI with acute osteomyelitis of 4th metatarsal head and neck of left proximal phalanx. ID recommended cefazolin and podiatry plan for for amputation. Pt is refusing amputation but agree to have agressive washout if he keep spiking fever. Pt transitioned to subQ insulin and tolerating PO. Blood ux grwe Staph coagulase negative, TTE done and shows normal valves with no vegetations.     Interval History: pt had spiking fever overnight and desating to 85% with need for 5L oxygen. Likely aspiration from vomiting he had yesterday evening. CXR with b/l patchy infiltration.      Review of Systems   Constitutional: Positive for fatigue. Negative for activity change and fever.   HENT: Negative for congestion, facial swelling, sore throat and trouble swallowing.    Respiratory: Negative for apnea, cough, chest tightness, shortness of breath and wheezing.    Cardiovascular: Positive for leg swelling. Negative for chest pain and palpitations.   Gastrointestinal: Negative for abdominal distention, abdominal pain, blood in stool and diarrhea.   Genitourinary: Negative for difficulty urinating, dysuria, flank pain and hematuria.   Musculoskeletal: Negative for arthralgias, back pain, myalgias and neck pain.   Skin: Negative for color change and rash.   Neurological: Negative for dizziness, weakness, light-headedness, numbness and headaches.   Hematological: Negative for adenopathy.   Psychiatric/Behavioral: Negative for agitation and behavioral problems.     Objective:     Vital Signs (Most Recent):  Temp: 99.3 °F (37.4 °C) (03/14/20 1615)  Pulse: (!) 111 (03/14/20 1615)  Resp: 18 (03/14/20 1615)  BP: (!) 156/90 (03/14/20 1615)  SpO2: (!) 93 % (03/14/20 1615) Vital Signs (24h Range):  Temp:  [97.4 °F (36.3 °C)-103.2 °F (39.6 °C)] 99.3 °F (37.4 °C)  Pulse:  [] 111  Resp:  [18-24] 18  SpO2:  [85 %-94 %] 93 %  BP: (100-187)/(58-92) 156/90     Weight: 94.8 kg (209 lb)  Body mass index is 27.57  kg/m².    Intake/Output Summary (Last 24 hours) at 3/14/2020 1653  Last data filed at 3/13/2020 2000  Gross per 24 hour   Intake --   Output 300 ml   Net -300 ml      Physical Exam   Constitutional: He is oriented to person, place, and time. He appears well-developed and well-nourished. No distress.   Looks well     HENT:   Head: Normocephalic and atraumatic.   Eyes: Pupils are equal, round, and reactive to light. Conjunctivae and EOM are normal.   Neck: Normal range of motion. Neck supple.   Cardiovascular: Normal rate, regular rhythm and normal heart sounds.   No murmur heard.  Pulmonary/Chest: Effort normal and breath sounds normal. No respiratory distress. He has no wheezes. He has no rales.   Abdominal: Soft. Bowel sounds are normal. He exhibits no distension. There is no tenderness.   Musculoskeletal: He exhibits no edema or deformity.   Lymphadenopathy:     He has no cervical adenopathy.   Neurological: He is alert and oriented to person, place, and time. He displays normal reflexes. No cranial nerve deficit or sensory deficit. He exhibits normal muscle tone.   Skin: Skin is warm. Capillary refill takes less than 2 seconds. He is not diaphoretic.   Psychiatric: He has a normal mood and affect. His behavior is normal.       Significant Labs:   Blood Culture:   Recent Labs   Lab 03/12/20  2108 03/14/20  0109   LABBLOO No Growth to date  No Growth to date No Growth to date  No Growth to date     CBC:   Recent Labs   Lab 03/13/20  0621 03/14/20  0355   WBC 5.70 7.32   HGB 10.4* 10.1*   HCT 34.4* 33.2*    185     CMP:   Recent Labs   Lab 03/13/20  0012 03/13/20  0621 03/13/20  2307 03/14/20  0913    139 137 134*   K 4.0 3.3* 3.2* 3.4*    103 99 99   CO2 27 27 29 27   * 231* 184* 234*   BUN 4* 5* 7 8   CREATININE 1.0 0.9 1.0 0.9   CALCIUM 7.6* 7.7* 7.8* 7.7*   PROT 6.4 6.6  --  6.8   ALBUMIN 1.8* 1.9*  --  1.8*   BILITOT 0.3 0.3  --  0.3   ALKPHOS 65 62  --  65   AST 17 17  --  17   ALT  5* <5*  --  <5*   ANIONGAP 8 9 9 8   EGFRNONAA >60.0 >60.0 >60.0 >60.0       Significant Imaging: I have reviewed all pertinent imaging results/findings within the past 24 hours.      Assessment/Plan:      * Diabetic ketoacidosis without coma associated with type 2 diabetes mellitus  48 y/o with a history of poorly controlled DM2 ,diabetic neuropathy, diabatic retinopathy and diabetic foot with multiple amputation who presents to ED with chief complaint of chills associated with nausea and vomiting for one day.   He has a history of foot ulcer of left big toe who currently treated with doxycycline.    -- afebrile, vitally stable.  -- Lab, BS: 382, B-hydroxybutyrate 3.6, HCO3 22,  anion gap 18.  -- WBC: 6.9, lactate: 1.2  -- X-ray of right food shows soft tissue edema concerning for osteomyelitis  -- last Ha1c 8.4  -- wound cx 3/5 positive for staph aureus     Plan :  -- started on insuline gtt   -- started on IVF infusion 100cc/h  -- CMP q4, Phos q4   -- replete electrolyte   -- blood cx drawn, follow up   -- AG closed and BS now in low 200s  -- Ha1c 12  --  transitioned to subQ insulin       Diabetic ulcer of left midfoot associated with type 2 diabetes mellitus, with bone involvement without evidence of necrosis  -- Pt has a hx of poorly controled DM   -- hx of diabetic ulcer with multiple amputations   -- currently treated with doxycyline   -- wound cx positive for S. aureus    --  Blood cx grew staph catalase negative.    Plan :   -- consulted podiatry, appreciate help   -- MRI with acute osteomyelitis of 4th metatarsal head and neck of left proximal phalanx  -- podiatry plan for amputation; ; patient hesitant    -- ID recommend cefazolin. vancomycin held by ID  -- pt still hesitant regarding amputation, he agree with aggressive washout if keep spiking fever    -- washed wound out at bedside.  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 and macular edema, with long-term current use of insulin  See DKA     Mixed hyperlipidemia    - continue home Atorvastatin 80 mg      Essential hypertension    - pt was started on losartan   - it was stopped because of hypotension episode   - keep holding       VTE Risk Mitigation (From admission, onward)         Ordered     heparin (porcine) injection 5,000 Units  Every 12 hours      03/12/20 1648     Place TEN hose  Until discontinued      03/10/20 1150     IP VTE LOW RISK PATIENT  Once      03/10/20 1150                      Vicky Singh MD  Department of Hospital Medicine   [facility]

## 2020-03-14 NOTE — NURSING
Spoke to Ivy on Rapid Response and was advised to titrate oxygen from 5L to 3L. Once titrated to 3L the pts O2 was 89%. I then titrated the oxygen to 3.5L and O2 is now 90%.

## 2020-03-14 NOTE — NURSING
Notified MD Terry of patient's fever and increased oxygen requirement. Temp is 102.4 F after receiving Tylenol and SpO2 is 87-89% on RA. Patient refused nasal cannula; encouraged use of incentive spirometer per MD and with cough/deep breathing exercises SpO2 increased to 91% on RA. Okay for SpO2 >90% per MD. Ibuprofen given for fever. CXR ordered. Patient has no complaints at this time. WCTM.

## 2020-03-14 NOTE — PROGRESS NOTES
[facility]  Podiatry  Progress Note    Patient Name: Indio Ryder Jr.  MRN: [id number]  Admission Date: 3/10/2020  Hospital Length of Stay: 4 days  Attending Physician: Antonio Valladares MD  Primary Care Provider: Lorena Thakur MD   Interval Hx:  S/p bedside debridement 3/12 per Dr. Erickson Patient Seen at bedside for dressing change.  Patient spiking a fever last night, complains of a cough, but says his nausea and vomiting have improved.  Dressings clean, dry, and intact.  Has suspected aspiration.    Scheduled Meds:   acetaminophen  650 mg Oral Once    atorvastatin  80 mg Oral Daily    gabapentin  600 mg Oral Daily    heparin (porcine)  5,000 Units Subcutaneous Q12H    hydroxyzine HCL  50 mg Oral Once    insulin aspart U-100  5 Units Subcutaneous TIDWM    insulin detemir U-100  15 Units Subcutaneous BID    losartan  12.5 mg Oral Daily    ondansetron  4 mg Intravenous Q8H    piperacillin-tazobactam (ZOSYN) IVPB  4.5 g Intravenous Q8H     Continuous Infusions:    PRN Meds:acetaminophen, Dextrose 10% Bolus, Dextrose 10% Bolus, glucagon (human recombinant), glucose, glucose, hydrALAZINE, insulin aspart U-100, ondansetron, promethazine (PHENERGAN) IVPB, sodium chloride 0.9%    Review of patient's allergies indicates:  No Known Allergies     Past Medical History:   Diagnosis Date    Actinomyces infection 1/17/2017    Right foot    Diabetic ketoacidosis without coma associated with type 2 diabetes mellitus 5/30/2017    Diabetic ulcer of right foot associated with type 2 diabetes mellitus 6/3/2015    Essential hypertension 6/6/2013    Group B streptococcal infection 1/13/2017    Mixed hyperlipidemia 8/12/2014    Shoulder impingement 8/12/2014    Subacute osteomyelitis of right foot 1/12/2017    Streptococcus agalactiae, Actinomyces odontolyticus    Traumatic amputation of fifth toe of right foot 07/02/2015    Type 2 diabetes mellitus with diabetic neuropathy, with long-term  current use of insulin 5/3/2016    Ulcerative colitis, unspecified      Past Surgical History:   Procedure Laterality Date    DEBRIDEMENT OF FOOT Left 7/14/2019    Procedure: DEBRIDEMENT, FOOT, with left 5th ray amputation;  Surgeon: Sis Hickman DPM;  Location: [facility] OR [address] [place] [address];  Service: Podiatry;  Laterality: Left;    DEBRIDEMENT OF FOOT Left 7/17/2019    Procedure: DEBRIDEMENT, FOOT with leftt 5th ray partial amputation with Neox Graft;  Surgeon: Mai Burrell DPM;  Location: [facility] OR [address] [place] [address];  Service: Podiatry;  Laterality: Left;  t    TOE AMPUTATION Right 06/05/2015    TOE AMPUTATION Right 01/19/2017       Family History     Problem Relation (Age of Onset)    Cancer Mother    Cataracts Father    Diabetes Mother, Sister    Hypertension Mother, Father, Maternal Aunt    No Known Problems Brother, Sister, Sister, Maternal Uncle, Paternal Aunt, Paternal Uncle, Maternal Grandmother, Maternal Grandfather, Paternal Grandmother, Paternal Grandfather        Tobacco Use    Smoking status: Never Smoker    Smokeless tobacco: Never Used   Substance and Sexual Activity    Alcohol use: No    Drug use: No    Sexual activity: Yes     Partners: Female     Birth control/protection: Condom     Review of Systems   Constitutional: Positive for fever. Negative for appetite change, chills and fatigue.   Respiratory: Positive for cough.    Gastrointestinal: Positive for nausea. Negative for diarrhea and vomiting.   Musculoskeletal: Negative for arthralgias, joint swelling and myalgias.   Skin: Positive for wound.   Neurological: Positive for weakness and numbness.   Psychiatric/Behavioral: Negative for behavioral problems and confusion.     Objective:     Vital Signs (Most Recent):  Temp: 99.3 °F (37.4 °C) (03/14/20 1140)  Pulse: 109 (03/14/20 1140)  Resp: 18 (03/14/20 1140)  BP: (!) 160/87 (03/14/20 1140)  SpO2: (!) 92 % (03/14/20 1140) Vital Signs (24h Range):  Temp:  [97.4 °F (36.3 °C)-103.2 °F (39.6 °C)] 99.3  °F (37.4 °C)  Pulse:  [] 109  Resp:  [17-24] 18  SpO2:  [85 %-99 %] 92 %  BP: (100-187)/(58-92) 160/87     Weight: 94.8 kg (209 lb)  Body mass index is 27.57 kg/m².    Foot Exam    General  Orientation: alert and oriented to person, place, and time   Affect: appropriate       Right Foot/Ankle     Inspection and Palpation  Ecchymosis: none  Tenderness: none   Swelling: none   Skin Exam: skin intact;     Neurovascular  Dorsalis pedis: 1+  Posterior tibial: 1+  Saphenous nerve sensation: diminished  Tibial nerve sensation: diminished  Superficial peroneal nerve sensation: diminished  Deep peroneal nerve sensation: diminished  Sural nerve sensation: diminished    Comments  Amputation of 1,5 digits    Left Foot/Ankle      Inspection and Palpation  Ecchymosis: none  Tenderness: none   Swelling: none   Skin Exam: drainage and ulcer;     Neurovascular  Dorsalis pedis: 1+  Posterior tibial: 1+  Saphenous nerve sensation: diminished  Tibial nerve sensation: diminished  Superficial peroneal nerve sensation: diminished  Deep peroneal nerve sensation: diminished  Sural nerve sensation: diminished    Comments  Partial 5th ray amputation      Laboratory:  A1C:   Recent Labs   Lab 12/03/19  1019 03/12/20  1224   HGBA1C 8.4* 12.0*     CBC:   Recent Labs   Lab 03/14/20  0355   WBC 7.32   RBC 3.45*   HGB 10.1*   HCT 33.2*      MCV 96   MCH 29.3   MCHC 30.4*     CMP:   Recent Labs   Lab 03/14/20  0913   *   CALCIUM 7.7*   ALBUMIN 1.8*   PROT 6.8   *   K 3.4*   CO2 27   CL 99   BUN 8   CREATININE 0.9   ALKPHOS 65   ALT <5*   AST 17   BILITOT 0.3     CRP:   No results for input(s): CRP in the last 168 hours.  ESR:   No results for input(s): SEDRATE in the last 168 hours.  Wound Cultures:   Recent Labs   Lab 03/05/20  1611 03/05/20  1612 03/12/20  1726   LABAERO STAPHYLOCOCCUS AUREUS  Many  Skin janes also present  * STAPHYLOCOCCUS AUREUS  Moderate  Skin janes also present  * STAPHYLOCOCCUS  AUREUS  Moderate  Susceptibility pending  *     Microbiology Results (last 7 days)     Procedure Component Value Units Date/Time    Culture, Respiratory with Gram Stain [[id number]] Collected:  03/14/20 1339    Order Status:  Completed Specimen:  Respiratory from Sputum, Expectorated Updated:  03/14/20 1502     Gram Stain (Respiratory) <10 epithelial cells per low power field.     Gram Stain (Respiratory) No WBC's     Gram Stain (Respiratory) Many Gram positive cocci     Gram Stain (Respiratory) Few Gram negative rods    Blood culture [[id number]] Collected:  03/11/20 1156    Order Status:  Completed Specimen:  Blood Updated:  03/14/20 1412     Blood Culture, Routine No Growth to date      No Growth to date      No Growth to date      No Growth to date    Respiratory Infection Panel (PCR), Nasopharyngeal [[id number]] Collected:  03/14/20 0306    Order Status:  Completed Specimen:  Nasopharyngeal Swab Updated:  03/14/20 1300     Respiratory Infection Panel Source NP Swab     Adenovirus Not Detected     Coronavirus 229E, Common Cold Virus Not Detected     Coronavirus HKU1, Common Cold Virus Not Detected     Coronavirus NL63, Common Cold Virus Not Detected     Coronavirus OC43, Common Cold Virus Not Detected     Comment: The Coronavirus strains detected in this test cause the common cold.  These strains are not the COVID-19 (novel Coronavirus)strain   associated with the respiratory disease outbreak.          Human Metapneumovirus Not Detected     Human Rhinovirus/Enterovirus Not Detected     Influenza A (subtypes H1, H1-2009,H3) Not Detected     Influenza B Not Detected     Parainfluenza Virus 1 Not Detected     Parainfluenza Virus 2 Not Detected     Parainfluenza Virus 3 Not Detected     Parainfluenza Virus 4 Not Detected     Respiratory Syncytial Virus Not Detected     Bordetella Parapertussis (XV5888) Not Detected     Bordetella pertussis (ptxP) Not Detected     Chlamydia pneumoniae Not Detected     Mycoplasma  pneumoniae Not Detected     Comment: Respiratory Infection Panel testing performed by Multiplex PCR.       Narrative:       For all other respiratory sources, order [id number] -  Respiratory Viral Panel by PCR    Aerobic culture [[id number]]  (Abnormal) Collected:  03/12/20 1726    Order Status:  Completed Specimen:  Wound from Foot, Left Updated:  03/14/20 1106     Aerobic Bacterial Culture STAPHYLOCOCCUS AUREUS  Moderate  Susceptibility pending      Influenza A & B by Molecular [[id number]] Collected:  03/14/20 1022    Order Status:  Completed Specimen:  Nasopharyngeal Swab Updated:  03/14/20 1054     Influenza A, Molecular Negative     Influenza B, Molecular Negative     Flu A & B Source Nasal swab    Blood culture [[id number]] Collected:  03/14/20 0109    Order Status:  Completed Specimen:  Blood Updated:  03/14/20 0915     Blood Culture, Routine No Growth to date    Blood culture [[id number]] Collected:  03/14/20 0109    Order Status:  Completed Specimen:  Blood Updated:  03/14/20 0915     Blood Culture, Routine No Growth to date    Culture, Respiratory with Gram Stain [[id number]]     Order Status:  Canceled Specimen:  Respiratory     Blood culture [[id number]] Collected:  03/12/20 2108    Order Status:  Completed Specimen:  Blood Updated:  03/13/20 2212     Blood Culture, Routine No Growth to date      No Growth to date    Blood culture [[id number]] Collected:  03/10/20 1057    Order Status:  Completed Specimen:  Blood from Peripheral, Antecubital, Left Updated:  03/13/20 1612     Blood Culture, Routine No Growth to date      No Growth to date      No Growth to date      No Growth to date    Blood culture [[id number]]  (Abnormal) Collected:  03/10/20 1213    Order Status:  Completed Specimen:  Blood from Peripheral, Wrist, Right Updated:  03/13/20 1455     Blood Culture, Routine Gram stain aer bottle: Gram positive cocci in clusters resembling Staph       Results called to and read back by: Erma Daniel RN  03/11/2020        14:54      Gram stain mary bottle: Gram positive cocci in clusters resembling Staph      COAGULASE-NEGATIVE STAPHYLOCOCCUS SPECIES  Multiple morphotyes - probable contaminates      Culture, Anaerobe [[id number]] Collected:  03/12/20 1726    Order Status:  Sent Specimen:  Wound from Foot, Left Updated:  03/12/20 2123        Specimen (12h ago, onward)    None          Diagnostic Results:  I have reviewed all pertinent imaging results/findings within the past 24 hours.   Imaging Results          X-Ray Foot Complete Left (Final result)  Result time 03/10/20 11:22:03    Final result by Vinnie Cortés III, MD (03/10/20 11:22:03)                 Narrative:    EXAMINATION:  XR FOOT COMPLETE 3 VIEW LEFT    CLINICAL HISTORY:  Non-pressure chronic ulcer of other part of left foot with unspecified severity    FINDINGS:  No fracture dislocation seen.  There is amputation of the 5th digit and partial amputation of the distal 5th metatarsal.  There is soft tissue swelling.  Osteomyelitis of the distal aspect of the remaining 5th metatarsal could not be excluded.  MRI could be helpful.      Electronically signed by: Vinnie Cortés MD  Date:    03/10/2020  Time:    11:22                                Clinical Findings:  Left foot ulcer  Location: Plantar 3rd/4th metatarsal head  Wound base: 20% granular; 80% fibrotic.  Periwound: viable  Drainage: purulent  Signs of infection: +probes to bone, no mal odor or purulent drainage.  Wound tracks at 9 o clock to 3rd metatarsal head and distally at 12 o clock to 4th toe. No erythema, no edema.    3/13                Assessment/Plan:     Diabetic ulcer of left midfoot associated with type 2 diabetes mellitus, with bone involvement without evidence of necrosis  Indio CHRISTIANO Ryder Jr. is a 49 y.o. male with bacteremia, fever, positive bone biopsy for osteo. S/p bedside debridement.     -washed wound out at bedside.  Foot appears clinically stable.  Likely fevers is not being  caused by foot.  -recommend continuing IV antibiotic, ID on board, appreciate Recs  MRI left forefoot demonstrating acute osteomyelitis of the 4th metatarsal head and neck and proximal phalanx.  No rim enhancing fluid collection or drainable abscess.  Currently no plans for surgical intervention.  Will continue to monitor.    Nursing to do daily betadin WTD dressing changes.      Mixed hyperlipidemia  Per primary    Essential hypertension  Per primary        Martin Parker MD  Podiatry  [facility]

## 2020-03-14 NOTE — ASSESSMENT & PLAN NOTE
Indio ALVARADO Britni Toure. is a 49 y.o. male with bacteremia, fever, positive bone biopsy for osteo. S/p bedside debridement.     -washed wound out at bedside.  Foot appears clinically stable.  Likely fevers is not being caused by foot.  -recommend continuing IV antibiotic, ID on board, appreciate Recs  MRI left forefoot demonstrating acute osteomyelitis of the 4th metatarsal head and neck and proximal phalanx.  No rim enhancing fluid collection or drainable abscess.  Currently no plans for surgical intervention.  Will continue to monitor.    Nursing to do daily betadin WTD dressing changes.

## 2020-03-14 NOTE — CARE UPDATE
Pt admitted for DKA which has since resolved, being tx with cefazolin for osteomyelitis of the left foot. He has had n/v since presentation and has been febrile throughout admission. Now with tachycardia and increased O2 requirements (up to 5L NC, sat 93%).    Upon evaluation, pt resting in bed not in respiratory distress. Denies any shortness of breath, chest pain or increased discomfort when breathing.     Vitals: T103F, , /58, O2 sat 91% on 1L NC.   CXR showing bibasilar patchy alveolar infiltrates with mild RUL infiltrate.     Suspect pneumonia vs pneumonitis 2/2 aspiration in the setting of intermittent vomiting.      Plan   - Broaden abx to zosyn to cover for aspiration pna  - Resp infection panel  - Resp culture  - Repeat BCx  - 1L LR due to vomiting and BP on the lower end of normal. (Of note, pt did receive prn hydralazine for hypertension prior to my arrival)   - Encourage IS

## 2020-03-14 NOTE — ASSESSMENT & PLAN NOTE
-- Pt has a hx of poorly controled DM   -- hx of diabetic ulcer with multiple amputations   -- currently treated with doxycyline   -- wound cx positive for S. aureus    --  Blood cx grew staph catalase negative.    Plan :   -- consulted podiatry, appreciate help   -- MRI with acute osteomyelitis of 4th metatarsal head and neck of left proximal phalanx  -- podiatry plan for amputation; ; patient hesitant    -- ID recommend cefazolin. vancomycin held by ID  -- pt still hesitant regarding amputation, he agree with aggressive washout if keep spiking fever    -- washed wound out at bedside.  Foot appears clinically stable

## 2020-03-14 NOTE — ASSESSMENT & PLAN NOTE
50 y/o with a history of poorly controlled DM2 ( last Ha1c 8.4 ), diabetic neuropathy, diabatic retinopathy and diabetic foot ulcer of left foot with multiple amputation who presents to ED with chief complaint of fever, chills and nausea and worsening wounds of his left foot. His blood cultures on admit are positive with GPCs. He was seen by podiatry 3/5/2020. Bone and wound cultures +MSSA. He was given course of doxycycline.     MRI studies confirms acute osteomyelitis of the 4th metatarsal head and neck and proximal phalanx.  No rim enhancing fluid collection or drainable abscess.  ALISSA studies wnl. Seen by podiatry and plans to undergo bedside debridement. Pt refusing amputation at this time. Blood cultures on admit are positive with coag negative staph. Repeat blood cultures are NGTD. Underwent debridement with podiatry at bedside. Deep wound cultures +S.aureus. Continues to be febrile.        Recommendations  1. Agree with zosyn as +fevesr and aspiration event overnight. CXR with RUL consolidation. Viral panel negative. Recommend RCx if able.   2. Underwent bedside debridement 3/12/2020. Deep wound cultures obtained +S.aureus.   3. Blood cultures on admit +coag negative staph. Repeat blood cultures are NGTD. TTE negative.   4. Will need 6 weeks of IV abx from date of bedside I&D (estimated end date 4/23/2020)   5. Consider u/s to r/o DVTs if persiently febrile  6. ID will follow closely with you

## 2020-03-14 NOTE — PROGRESS NOTES
[facility]  Infectious Disease  Progress Note    Patient Name: Indio Ryder Jr.  MRN: [id number]  Admission Date: 3/10/2020  Length of Stay: 4 days  Attending Physician: Antonio Valladares MD  Primary Care Provider: Lorena Thakur MD    Isolation Status: No active isolations  Assessment/Plan:      Osteomyelitis of left foot  50 y/o with a history of poorly controlled DM2 ( last Ha1c 8.4 ), diabetic neuropathy, diabatic retinopathy and diabetic foot ulcer of left foot with multiple amputation who presents to ED with chief complaint of fever, chills and nausea and worsening wounds of his left foot. His blood cultures on admit are positive with GPCs. He was seen by podiatry 3/5/2020. Bone and wound cultures +MSSA. He was given course of doxycycline.     MRI studies confirms acute osteomyelitis of the 4th metatarsal head and neck and proximal phalanx.  No rim enhancing fluid collection or drainable abscess.  ALISSA studies wnl. Seen by podiatry and plans to undergo bedside debridement. Pt refusing amputation at this time. Blood cultures on admit are positive with coag negative staph. Repeat blood cultures are NGTD. Underwent debridement with podiatry at bedside. Deep wound cultures +S.aureus. Continues to be febrile.        Recommendations  1. Agree with zosyn as +fevesr and aspiration event overnight. CXR with RUL consolidation. Viral panel negative. Recommend RCx if able.   2. Underwent bedside debridement 3/12/2020. Deep wound cultures obtained +S.aureus.   3. Blood cultures on admit +coag negative staph. Repeat blood cultures are NGTD. TTE negative.   4. Will need 6 weeks of IV abx from date of bedside I&D (estimated end date 4/23/2020)   5. Consider u/s to r/o DVTs if persiently febrile  6. ID will follow closely with you           Thank you for your consult. I will follow-up with patient. Please contact us if you have any additional questions.    Florence Doe PA-C  Infectious  Disease  [facility]  [phone]    Subjective:     Principal Problem:Diabetic ketoacidosis without coma associated with type 2 diabetes mellitus    HPI: Mr. Ryder is 48 y/o with a history of poorly controlled DM2 ( last Ha1c 8.4 ) , DKA with coma, diabetic neuropathy, diabatic retinopathy and diabetic foot with multiple amputation who presents to ED with chief complaint of chills associated with nausea and vomiting for one day. He sates that yesterday he started to feel ill and he has been having chills associated with nauseas and vomiting. He also endorses poor appetite with decreased oral intake for the last week. Denies fever, abdominal pain, feet or leg pain, leg swelling, CP, SOB, dysuria. He a history of poorly control diabetes with amputation of his right big and 5th toes. He undergone left big toe detriment and imputation on 7/14/2019. He reports not feeling well for the last couple of weeks and he felt about to pass out on [employer] gras and his BP was in 90/50. He was seen by podiatry last  Thursday when he had his wound opened and he was prescribed doxycycline for 10 days.    Bone cultures +MSSA on 3/5/2020. He is currently on vancomycin and zosyn.     MRI studies confirms acute osteomyelitis of the 4th metatarsal head and neck and proximal phalanx.  No rim enhancing fluid collection or drainable abscess.  ALISSA studies wnl  Blood cultures on admit are positive with GPCs    Interval History:   Repeat blood cultures are NGTD  Continues to have fevers. Feeling nauseated. Aspirated yestrday with tmax 103.  Wound cultures +S.aurues   Blood cultures on admit +coag negative Staph    Review of Systems   Constitutional: Positive for fatigue and fever. Negative for activity change, appetite change, chills and diaphoresis.   Respiratory: Positive for cough. Negative for shortness of breath.    Cardiovascular: Positive for leg swelling.   Gastrointestinal: Positive for nausea. Negative for abdominal  pain, constipation, diarrhea and vomiting.   Genitourinary: Negative for dysuria.   Musculoskeletal: Negative for back pain.   Skin: Positive for wound. Negative for color change, pallor and rash.   Neurological: Negative for dizziness, light-headedness and headaches.   All other systems reviewed and are negative.    Objective:     Vital Signs (Most Recent):  Temp: 99.3 °F (37.4 °C) (03/14/20 1140)  Pulse: 109 (03/14/20 1140)  Resp: 18 (03/14/20 1140)  BP: (!) 160/87 (03/14/20 1140)  SpO2: (!) 92 % (03/14/20 1140) Vital Signs (24h Range):  Temp:  [97.4 °F (36.3 °C)-103.2 °F (39.6 °C)] 99.3 °F (37.4 °C)  Pulse:  [] 109  Resp:  [17-24] 18  SpO2:  [85 %-99 %] 92 %  BP: (100-187)/(58-92) 160/87     Weight: 94.8 kg (209 lb)  Body mass index is 27.57 kg/m².    Estimated Creatinine Clearance: 109.7 mL/min (based on SCr of 0.9 mg/dL).    Physical Exam   Constitutional: He appears well-developed and well-nourished. No distress.   HENT:   Head: Normocephalic.   Eyes: Pupils are equal, round, and reactive to light.   Cardiovascular: Normal rate, regular rhythm, normal heart sounds and intact distal pulses.   No murmur heard.  Pulmonary/Chest: Effort normal. No stridor. No respiratory distress. He has no wheezes.   Abdominal: Soft. He exhibits no distension. There is no tenderness.   Musculoskeletal: Normal range of motion. He exhibits edema. He exhibits no tenderness or deformity.   Neurological: He is alert.   Skin: Skin is warm and dry. He is not diaphoretic. There is erythema.   Psychiatric: He has a normal mood and affect.   Vitals reviewed.      Significant Labs: All pertinent labs within the past 24 hours have been reviewed.    Significant Imaging: I have reviewed all pertinent imaging results/findings within the past 24 hours.

## 2020-03-14 NOTE — SUBJECTIVE & OBJECTIVE
Interval History:   Repeat blood cultures are NGTD  Continues to have fevers. Feeling nauseated. Aspirated yestrday with tmax 103.  Wound cultures +S.aurues   Blood cultures on admit +coag negative Staph    Review of Systems   Constitutional: Positive for fatigue and fever. Negative for activity change, appetite change, chills and diaphoresis.   Respiratory: Positive for cough. Negative for shortness of breath.    Cardiovascular: Positive for leg swelling.   Gastrointestinal: Positive for nausea. Negative for abdominal pain, constipation, diarrhea and vomiting.   Genitourinary: Negative for dysuria.   Musculoskeletal: Negative for back pain.   Skin: Positive for wound. Negative for color change, pallor and rash.   Neurological: Negative for dizziness, light-headedness and headaches.   All other systems reviewed and are negative.    Objective:     Vital Signs (Most Recent):  Temp: 99.3 °F (37.4 °C) (03/14/20 1140)  Pulse: 109 (03/14/20 1140)  Resp: 18 (03/14/20 1140)  BP: (!) 160/87 (03/14/20 1140)  SpO2: (!) 92 % (03/14/20 1140) Vital Signs (24h Range):  Temp:  [97.4 °F (36.3 °C)-103.2 °F (39.6 °C)] 99.3 °F (37.4 °C)  Pulse:  [] 109  Resp:  [17-24] 18  SpO2:  [85 %-99 %] 92 %  BP: (100-187)/(58-92) 160/87     Weight: 94.8 kg (209 lb)  Body mass index is 27.57 kg/m².    Estimated Creatinine Clearance: 109.7 mL/min (based on SCr of 0.9 mg/dL).    Physical Exam   Constitutional: He appears well-developed and well-nourished. No distress.   HENT:   Head: Normocephalic.   Eyes: Pupils are equal, round, and reactive to light.   Cardiovascular: Normal rate, regular rhythm, normal heart sounds and intact distal pulses.   No murmur heard.  Pulmonary/Chest: Effort normal. No stridor. No respiratory distress. He has no wheezes.   Abdominal: Soft. He exhibits no distension. There is no tenderness.   Musculoskeletal: Normal range of motion. He exhibits edema. He exhibits no tenderness or deformity.   Neurological: He is  alert.   Skin: Skin is warm and dry. He is not diaphoretic. There is erythema.   Psychiatric: He has a normal mood and affect.   Vitals reviewed.      Significant Labs: All pertinent labs within the past 24 hours have been reviewed.    Significant Imaging: I have reviewed all pertinent imaging results/findings within the past 24 hours.

## 2020-03-14 NOTE — NURSING
Wound care completed to left foot. Irrigated with sterile saline, betadine gauze applied, and covered with ABD pad, kerlix, and ace bandage. Pt tolerated well.

## 2020-03-14 NOTE — PLAN OF CARE
Pt AAOx4, VSS, denies pain. At beginning of day pt's 02 sats were 88%. Pt placed on 2L NC and 02 sats up to 94%. Wound care performed to left foot today. Pt still receiving IV antibiotics. Ancef d/c'ed. BG controlled with scheduled Novolog. Sliding scale not utilized due to pt's decreased PO intake. Will continue to monitor.

## 2020-03-14 NOTE — SUBJECTIVE & OBJECTIVE
Interval Hx:  S/p bedside debridement 3/12 per Dr. Erickson Patient Seen at bedside for dressing change.  Patient spiking a fever last night, complains of a cough, but says his nausea and vomiting have improved.  Dressings clean, dry, and intact.  Has suspected aspiration.    Scheduled Meds:   acetaminophen  650 mg Oral Once    atorvastatin  80 mg Oral Daily    gabapentin  600 mg Oral Daily    heparin (porcine)  5,000 Units Subcutaneous Q12H    hydroxyzine HCL  50 mg Oral Once    insulin aspart U-100  5 Units Subcutaneous TIDWM    insulin detemir U-100  15 Units Subcutaneous BID    losartan  12.5 mg Oral Daily    ondansetron  4 mg Intravenous Q8H    piperacillin-tazobactam (ZOSYN) IVPB  4.5 g Intravenous Q8H     Continuous Infusions:    PRN Meds:acetaminophen, Dextrose 10% Bolus, Dextrose 10% Bolus, glucagon (human recombinant), glucose, glucose, hydrALAZINE, insulin aspart U-100, ondansetron, promethazine (PHENERGAN) IVPB, sodium chloride 0.9%    Review of patient's allergies indicates:  No Known Allergies     Past Medical History:   Diagnosis Date    Actinomyces infection 1/17/2017    Right foot    Diabetic ketoacidosis without coma associated with type 2 diabetes mellitus 5/30/2017    Diabetic ulcer of right foot associated with type 2 diabetes mellitus 6/3/2015    Essential hypertension 6/6/2013    Group B streptococcal infection 1/13/2017    Mixed hyperlipidemia 8/12/2014    Shoulder impingement 8/12/2014    Subacute osteomyelitis of right foot 1/12/2017    Streptococcus agalactiae, Actinomyces odontolyticus    Traumatic amputation of fifth toe of right foot 07/02/2015    Type 2 diabetes mellitus with diabetic neuropathy, with long-term current use of insulin 5/3/2016    Ulcerative colitis, unspecified      Past Surgical History:   Procedure Laterality Date    DEBRIDEMENT OF FOOT Left 7/14/2019    Procedure: DEBRIDEMENT, FOOT, with left 5th ray amputation;  Surgeon: Sis Hickman DPM;   [facility];  Service: Podiatry;  Laterality: Left;    DEBRIDEMENT OF FOOT Left 7/17/2019    Procedure: DEBRIDEMENT, FOOT with leftt 5th ray partial amputation with Neox Graft;  Surgeon: Mai Burrell DPM;  Location: [facility] OR [address] [place] [address];  Service: Podiatry;  Laterality: Left;  t    TOE AMPUTATION Right 06/05/2015    TOE AMPUTATION Right 01/19/2017       Family History     Problem Relation (Age of Onset)    Cancer Mother    Cataracts Father    Diabetes Mother, Sister    Hypertension Mother, Father, Maternal Aunt    No Known Problems Brother, Sister, Sister, Maternal Uncle, Paternal Aunt, Paternal Uncle, Maternal Grandmother, Maternal Grandfather, Paternal Grandmother, Paternal Grandfather        Tobacco Use    Smoking status: Never Smoker    Smokeless tobacco: Never Used   Substance and Sexual Activity    Alcohol use: No    Drug use: No    Sexual activity: Yes     Partners: Female     Birth control/protection: Condom     Review of Systems   Constitutional: Positive for fever. Negative for appetite change, chills and fatigue.   Respiratory: Positive for cough.    Gastrointestinal: Positive for nausea. Negative for diarrhea and vomiting.   Musculoskeletal: Negative for arthralgias, joint swelling and myalgias.   Skin: Positive for wound.   Neurological: Positive for weakness and numbness.   Psychiatric/Behavioral: Negative for behavioral problems and confusion.     Objective:     Vital Signs (Most Recent):  Temp: 99.3 °F (37.4 °C) (03/14/20 1140)  Pulse: 109 (03/14/20 1140)  Resp: 18 (03/14/20 1140)  BP: (!) 160/87 (03/14/20 1140)  SpO2: (!) 92 % (03/14/20 1140) Vital Signs (24h Range):  Temp:  [97.4 °F (36.3 °C)-103.2 °F (39.6 °C)] 99.3 °F (37.4 °C)  Pulse:  [] 109  Resp:  [17-24] 18  SpO2:  [85 %-99 %] 92 %  BP: (100-187)/(58-92) 160/87     Weight: 94.8 kg (209 lb)  Body mass index is 27.57 kg/m².    Foot Exam    General  Orientation: alert and oriented to person, place, and time    Affect: appropriate       Right Foot/Ankle     Inspection and Palpation  Ecchymosis: none  Tenderness: none   Swelling: none   Skin Exam: skin intact;     Neurovascular  Dorsalis pedis: 1+  Posterior tibial: 1+  Saphenous nerve sensation: diminished  Tibial nerve sensation: diminished  Superficial peroneal nerve sensation: diminished  Deep peroneal nerve sensation: diminished  Sural nerve sensation: diminished    Comments  Amputation of 1,5 digits    Left Foot/Ankle      Inspection and Palpation  Ecchymosis: none  Tenderness: none   Swelling: none   Skin Exam: drainage and ulcer;     Neurovascular  Dorsalis pedis: 1+  Posterior tibial: 1+  Saphenous nerve sensation: diminished  Tibial nerve sensation: diminished  Superficial peroneal nerve sensation: diminished  Deep peroneal nerve sensation: diminished  Sural nerve sensation: diminished    Comments  Partial 5th ray amputation      Laboratory:  A1C:   Recent Labs   Lab 12/03/19  1019 03/12/20  1224   HGBA1C 8.4* 12.0*     CBC:   Recent Labs   Lab 03/14/20  0355   WBC 7.32   RBC 3.45*   HGB 10.1*   HCT 33.2*      MCV 96   MCH 29.3   MCHC 30.4*     CMP:   Recent Labs   Lab 03/14/20  0913   *   CALCIUM 7.7*   ALBUMIN 1.8*   PROT 6.8   *   K 3.4*   CO2 27   CL 99   BUN 8   CREATININE 0.9   ALKPHOS 65   ALT <5*   AST 17   BILITOT 0.3     CRP:   No results for input(s): CRP in the last 168 hours.  ESR:   No results for input(s): SEDRATE in the last 168 hours.  Wound Cultures:   Recent Labs   Lab 03/05/20  1611 03/05/20  1612 03/12/20  1726   LABAERO STAPHYLOCOCCUS AUREUS  Many  Skin janes also present  * STAPHYLOCOCCUS AUREUS  Moderate  Skin janes also present  * STAPHYLOCOCCUS AUREUS  Moderate  Susceptibility pending  *     Microbiology Results (last 7 days)     Procedure Component Value Units Date/Time    Culture, Respiratory with Gram Stain [[id number]] Collected:  03/14/20 1339    Order Status:  Completed Specimen:  Respiratory from Sputum,  Expectorated Updated:  03/14/20 1502     Gram Stain (Respiratory) <10 epithelial cells per low power field.     Gram Stain (Respiratory) No WBC's     Gram Stain (Respiratory) Many Gram positive cocci     Gram Stain (Respiratory) Few Gram negative rods    Blood culture [[id number]] Collected:  03/11/20 1156    Order Status:  Completed Specimen:  Blood Updated:  03/14/20 1412     Blood Culture, Routine No Growth to date      No Growth to date      No Growth to date      No Growth to date    Respiratory Infection Panel (PCR), Nasopharyngeal [[id number]] Collected:  03/14/20 0306    Order Status:  Completed Specimen:  Nasopharyngeal Swab Updated:  03/14/20 1300     Respiratory Infection Panel Source NP Swab     Adenovirus Not Detected     Coronavirus 229E, Common Cold Virus Not Detected     Coronavirus HKU1, Common Cold Virus Not Detected     Coronavirus NL63, Common Cold Virus Not Detected     Coronavirus OC43, Common Cold Virus Not Detected     Comment: The Coronavirus strains detected in this test cause the common cold.  These strains are not the COVID-19 (novel Coronavirus)strain   associated with the respiratory disease outbreak.          Human Metapneumovirus Not Detected     Human Rhinovirus/Enterovirus Not Detected     Influenza A (subtypes H1, H1-2009,H3) Not Detected     Influenza B Not Detected     Parainfluenza Virus 1 Not Detected     Parainfluenza Virus 2 Not Detected     Parainfluenza Virus 3 Not Detected     Parainfluenza Virus 4 Not Detected     Respiratory Syncytial Virus Not Detected     Bordetella Parapertussis (DK2232) Not Detected     Bordetella pertussis (ptxP) Not Detected     Chlamydia pneumoniae Not Detected     Mycoplasma pneumoniae Not Detected     Comment: Respiratory Infection Panel testing performed by Multiplex PCR.       Narrative:       For all other respiratory sources, order [id number] -  Respiratory Viral Panel by PCR    Aerobic culture [[id number]]  (Abnormal) Collected:  03/12/20 1726     Order Status:  Completed Specimen:  Wound from Foot, Left Updated:  03/14/20 1106     Aerobic Bacterial Culture STAPHYLOCOCCUS AUREUS  Moderate  Susceptibility pending      Influenza A & B by Molecular [[id number]] Collected:  03/14/20 1022    Order Status:  Completed Specimen:  Nasopharyngeal Swab Updated:  03/14/20 1054     Influenza A, Molecular Negative     Influenza B, Molecular Negative     Flu A & B Source Nasal swab    Blood culture [[id number]] Collected:  03/14/20 0109    Order Status:  Completed Specimen:  Blood Updated:  03/14/20 0915     Blood Culture, Routine No Growth to date    Blood culture [[id number]] Collected:  03/14/20 0109    Order Status:  Completed Specimen:  Blood Updated:  03/14/20 0915     Blood Culture, Routine No Growth to date    Culture, Respiratory with Gram Stain [[id number]]     Order Status:  Canceled Specimen:  Respiratory     Blood culture [[id number]] Collected:  03/12/20 2108    Order Status:  Completed Specimen:  Blood Updated:  03/13/20 2212     Blood Culture, Routine No Growth to date      No Growth to date    Blood culture [[id number]] Collected:  03/10/20 1057    Order Status:  Completed Specimen:  Blood from Peripheral, Antecubital, Left Updated:  03/13/20 1612     Blood Culture, Routine No Growth to date      No Growth to date      No Growth to date      No Growth to date    Blood culture [[id number]]  (Abnormal) Collected:  03/10/20 1213    Order Status:  Completed Specimen:  Blood from Peripheral, Wrist, Right Updated:  03/13/20 1455     Blood Culture, Routine Gram stain aer bottle: Gram positive cocci in clusters resembling Staph       Results called to and read back by: Erma Daniel RN 03/11/2020        14:54      Gram stain mary bottle: Gram positive cocci in clusters resembling Staph      COAGULASE-NEGATIVE STAPHYLOCOCCUS SPECIES  Multiple morphotyes - probable contaminates      Culture, Anaerobe [[id number]] Collected:  03/12/20 1726    Order Status:  Sent Specimen:   Wound from Foot, Left Updated:  03/12/20 2123        Specimen (12h ago, onward)    None          Diagnostic Results:  I have reviewed all pertinent imaging results/findings within the past 24 hours.   Imaging Results          X-Ray Foot Complete Left (Final result)  Result time 03/10/20 11:22:03    Final result by Vinnie Cortés III, MD (03/10/20 11:22:03)                 Narrative:    EXAMINATION:  XR FOOT COMPLETE 3 VIEW LEFT    CLINICAL HISTORY:  Non-pressure chronic ulcer of other part of left foot with unspecified severity    FINDINGS:  No fracture dislocation seen.  There is amputation of the 5th digit and partial amputation of the distal 5th metatarsal.  There is soft tissue swelling.  Osteomyelitis of the distal aspect of the remaining 5th metatarsal could not be excluded.  MRI could be helpful.      Electronically signed by: Vinnie Cortés MD  Date:    03/10/2020  Time:    11:22                                Clinical Findings:  Left foot ulcer  Location: Plantar 3rd/4th metatarsal head  Wound base: 20% granular; 80% fibrotic.  Periwound: viable  Drainage: purulent  Signs of infection: +probes to bone, no mal odor or purulent drainage.  Wound tracks at 9 o clock to 3rd metatarsal head and distally at 12 o clock to 4th toe. No erythema, no edema.    3/13

## 2020-03-14 NOTE — ASSESSMENT & PLAN NOTE
50 y/o with a history of poorly controlled DM2 ,diabetic neuropathy, diabatic retinopathy and diabetic foot with multiple amputation who presents to ED with chief complaint of chills associated with nausea and vomiting for one day.   He has a history of foot ulcer of left big toe who currently treated with doxycycline.    -- afebrile, vitally stable.  -- Lab, BS: 382, B-hydroxybutyrate 3.6, HCO3 22,  anion gap 18.  -- WBC: 6.9, lactate: 1.2  -- X-ray of right food shows soft tissue edema concerning for osteomyelitis  -- last Ha1c 8.4  -- wound cx 3/5 positive for staph aureus     Plan :  -- started on insuline gtt   -- started on IVF infusion 100cc/h  -- CMP q4, Phos q4   -- replete electrolyte   -- blood cx drawn, follow up   -- AG closed and BS now in low 200s  -- Ha1c 12  --  transitioned to subQ insulin

## 2020-03-14 NOTE — PLAN OF CARE
Pt is AAOx3. BG was 225,  15 units of levemir and 1 unit of asaprt U  given. Pt was given zofran to control nausea. Pt is now receiving piperacillin q8 IV. Pt received 3 doses of KCL to correct hypokalemia (3.2). Pt is calmly in bed with side rails     up x2, call light within reach, and bed lowered.

## 2020-03-14 NOTE — SUBJECTIVE & OBJECTIVE
Interval History: pt had spiking fever overnight and desating to 85% with need for 5L oxygen. Likely aspiration from vomiting he had yesterday evening. CXR with b/l patchy infiltration.      Review of Systems   Constitutional: Positive for fatigue. Negative for activity change and fever.   HENT: Negative for congestion, facial swelling, sore throat and trouble swallowing.    Respiratory: Negative for apnea, cough, chest tightness, shortness of breath and wheezing.    Cardiovascular: Positive for leg swelling. Negative for chest pain and palpitations.   Gastrointestinal: Negative for abdominal distention, abdominal pain, blood in stool and diarrhea.   Genitourinary: Negative for difficulty urinating, dysuria, flank pain and hematuria.   Musculoskeletal: Negative for arthralgias, back pain, myalgias and neck pain.   Skin: Negative for color change and rash.   Neurological: Negative for dizziness, weakness, light-headedness, numbness and headaches.   Hematological: Negative for adenopathy.   Psychiatric/Behavioral: Negative for agitation and behavioral problems.     Objective:     Vital Signs (Most Recent):  Temp: 99.3 °F (37.4 °C) (03/14/20 1615)  Pulse: (!) 111 (03/14/20 1615)  Resp: 18 (03/14/20 1615)  BP: (!) 156/90 (03/14/20 1615)  SpO2: (!) 93 % (03/14/20 1615) Vital Signs (24h Range):  Temp:  [97.4 °F (36.3 °C)-103.2 °F (39.6 °C)] 99.3 °F (37.4 °C)  Pulse:  [] 111  Resp:  [18-24] 18  SpO2:  [85 %-94 %] 93 %  BP: (100-187)/(58-92) 156/90     Weight: 94.8 kg (209 lb)  Body mass index is 27.57 kg/m².    Intake/Output Summary (Last 24 hours) at 3/14/2020 1653  Last data filed at 3/13/2020 2000  Gross per 24 hour   Intake --   Output 300 ml   Net -300 ml      Physical Exam   Constitutional: He is oriented to person, place, and time. He appears well-developed and well-nourished. No distress.   Looks well     HENT:   Head: Normocephalic and atraumatic.   Eyes: Pupils are equal, round, and reactive to light.  Conjunctivae and EOM are normal.   Neck: Normal range of motion. Neck supple.   Cardiovascular: Normal rate, regular rhythm and normal heart sounds.   No murmur heard.  Pulmonary/Chest: Effort normal and breath sounds normal. No respiratory distress. He has no wheezes. He has no rales.   Abdominal: Soft. Bowel sounds are normal. He exhibits no distension. There is no tenderness.   Musculoskeletal: He exhibits no edema or deformity.   Lymphadenopathy:     He has no cervical adenopathy.   Neurological: He is alert and oriented to person, place, and time. He displays normal reflexes. No cranial nerve deficit or sensory deficit. He exhibits normal muscle tone.   Skin: Skin is warm. Capillary refill takes less than 2 seconds. He is not diaphoretic.   Psychiatric: He has a normal mood and affect. His behavior is normal.       Significant Labs:   Blood Culture:   Recent Labs   Lab 03/12/20  2108 03/14/20  0109   LABBLOO No Growth to date  No Growth to date No Growth to date  No Growth to date     CBC:   Recent Labs   Lab 03/13/20  0621 03/14/20  0355   WBC 5.70 7.32   HGB 10.4* 10.1*   HCT 34.4* 33.2*    185     CMP:   Recent Labs   Lab 03/13/20  0012 03/13/20  0621 03/13/20  2307 03/14/20  0913    139 137 134*   K 4.0 3.3* 3.2* 3.4*    103 99 99   CO2 27 27 29 27   * 231* 184* 234*   BUN 4* 5* 7 8   CREATININE 1.0 0.9 1.0 0.9   CALCIUM 7.6* 7.7* 7.8* 7.7*   PROT 6.4 6.6  --  6.8   ALBUMIN 1.8* 1.9*  --  1.8*   BILITOT 0.3 0.3  --  0.3   ALKPHOS 65 62  --  65   AST 17 17  --  17   ALT 5* <5*  --  <5*   ANIONGAP 8 9 9 8   EGFRNONAA >60.0 >60.0 >60.0 >60.0       Significant Imaging: I have reviewed all pertinent imaging results/findings within the past 24 hours.

## 2020-03-15 PROBLEM — Z20.822 SUSPECTED COVID-19 VIRUS INFECTION: Status: ACTIVE | Noted: 2020-03-15

## 2020-03-15 PROBLEM — J96.01 ACUTE HYPOXEMIC RESPIRATORY FAILURE: Status: ACTIVE | Noted: 2020-03-15

## 2020-03-15 PROBLEM — R65.20 SEPSIS WITH ACUTE ORGAN DYSFUNCTION: Status: ACTIVE | Noted: 2019-07-12

## 2020-03-15 PROBLEM — E83.42 HYPOMAGNESEMIA: Status: ACTIVE | Noted: 2020-03-15

## 2020-03-15 LAB
ALBUMIN SERPL BCP-MCNC: 1.9 G/DL (ref 3.5–5.2)
ALP SERPL-CCNC: 84 U/L (ref 55–135)
ALT SERPL W/O P-5'-P-CCNC: <5 U/L (ref 10–44)
ANION GAP SERPL CALC-SCNC: 8 MMOL/L (ref 8–16)
ANISOCYTOSIS BLD QL SMEAR: SLIGHT
AST SERPL-CCNC: 25 U/L (ref 10–40)
BACTERIA #/AREA URNS AUTO: ABNORMAL /HPF
BACTERIA BLD CULT: NORMAL
BASOPHILS # BLD AUTO: 0.02 K/UL (ref 0–0.2)
BASOPHILS NFR BLD: 0.2 % (ref 0–1.9)
BILIRUB SERPL-MCNC: 0.3 MG/DL (ref 0.1–1)
BILIRUB UR QL STRIP: NEGATIVE
BUN SERPL-MCNC: 9 MG/DL (ref 6–20)
CALCIUM SERPL-MCNC: 8 MG/DL (ref 8.7–10.5)
CHLORIDE SERPL-SCNC: 94 MMOL/L (ref 95–110)
CLARITY UR REFRACT.AUTO: CLEAR
CO2 SERPL-SCNC: 31 MMOL/L (ref 23–29)
COLOR UR AUTO: ABNORMAL
CREAT SERPL-MCNC: 1.5 MG/DL (ref 0.5–1.4)
DIFFERENTIAL METHOD: ABNORMAL
EOSINOPHIL # BLD AUTO: 0 K/UL (ref 0–0.5)
EOSINOPHIL NFR BLD: 0 % (ref 0–8)
ERYTHROCYTE [DISTWIDTH] IN BLOOD BY AUTOMATED COUNT: 12.5 % (ref 11.5–14.5)
EST. GFR  (AFRICAN AMERICAN): >60 ML/MIN/1.73 M^2
EST. GFR  (NON AFRICAN AMERICAN): 53.1 ML/MIN/1.73 M^2
GIANT PLATELETS BLD QL SMEAR: PRESENT
GLUCOSE SERPL-MCNC: 138 MG/DL (ref 70–110)
GLUCOSE UR QL STRIP: NEGATIVE
HCT VFR BLD AUTO: 37.8 % (ref 40–54)
HGB BLD-MCNC: 11.5 G/DL (ref 14–18)
HGB UR QL STRIP: ABNORMAL
HYALINE CASTS UR QL AUTO: 0 /LPF
IMM GRANULOCYTES # BLD AUTO: 0.05 K/UL (ref 0–0.04)
IMM GRANULOCYTES NFR BLD AUTO: 0.6 % (ref 0–0.5)
KETONES UR QL STRIP: NEGATIVE
LEUKOCYTE ESTERASE UR QL STRIP: NEGATIVE
LYMPHOCYTES # BLD AUTO: 1.6 K/UL (ref 1–4.8)
LYMPHOCYTES NFR BLD: 19.6 % (ref 18–48)
MAGNESIUM SERPL-MCNC: 2.8 MG/DL (ref 1.6–2.6)
MCH RBC QN AUTO: 29 PG (ref 27–31)
MCHC RBC AUTO-ENTMCNC: 30.4 G/DL (ref 32–36)
MCV RBC AUTO: 95 FL (ref 82–98)
MICROSCOPIC COMMENT: ABNORMAL
MONOCYTES # BLD AUTO: 0.3 K/UL (ref 0.3–1)
MONOCYTES NFR BLD: 3.9 % (ref 4–15)
NEUTROPHILS # BLD AUTO: 6.2 K/UL (ref 1.8–7.7)
NEUTROPHILS NFR BLD: 75.7 % (ref 38–73)
NITRITE UR QL STRIP: NEGATIVE
NRBC BLD-RTO: 0 /100 WBC
PH UR STRIP: 6 [PH] (ref 5–8)
PHOSPHATE SERPL-MCNC: 3.1 MG/DL (ref 2.7–4.5)
PLATELET # BLD AUTO: 233 K/UL (ref 150–350)
PLATELET BLD QL SMEAR: ABNORMAL
PMV BLD AUTO: 11.3 FL (ref 9.2–12.9)
POCT GLUCOSE: 111 MG/DL (ref 70–110)
POCT GLUCOSE: 114 MG/DL (ref 70–110)
POCT GLUCOSE: 148 MG/DL (ref 70–110)
POCT GLUCOSE: 156 MG/DL (ref 70–110)
POCT GLUCOSE: 76 MG/DL (ref 70–110)
POIKILOCYTOSIS BLD QL SMEAR: SLIGHT
POTASSIUM SERPL-SCNC: 3.8 MMOL/L (ref 3.5–5.1)
PROT SERPL-MCNC: 7.3 G/DL (ref 6–8.4)
PROT UR QL STRIP: ABNORMAL
RBC # BLD AUTO: 3.97 M/UL (ref 4.6–6.2)
RBC #/AREA URNS AUTO: 2 /HPF (ref 0–4)
SODIUM SERPL-SCNC: 133 MMOL/L (ref 136–145)
SP GR UR STRIP: 1 (ref 1–1.03)
URN SPEC COLLECT METH UR: ABNORMAL
WBC # BLD AUTO: 8.18 K/UL (ref 3.9–12.7)
WBC #/AREA URNS AUTO: 0 /HPF (ref 0–5)
YEAST UR QL AUTO: ABNORMAL

## 2020-03-15 PROCEDURE — 63600175 PHARM REV CODE 636 W HCPCS: Performed by: STUDENT IN AN ORGANIZED HEALTH CARE EDUCATION/TRAINING PROGRAM

## 2020-03-15 PROCEDURE — 25000003 PHARM REV CODE 250: Performed by: STUDENT IN AN ORGANIZED HEALTH CARE EDUCATION/TRAINING PROGRAM

## 2020-03-15 PROCEDURE — 20600001 HC STEP DOWN PRIVATE ROOM

## 2020-03-15 PROCEDURE — 80053 COMPREHEN METABOLIC PANEL: CPT

## 2020-03-15 PROCEDURE — 99233 PR SUBSEQUENT HOSPITAL CARE,LEVL III: ICD-10-PCS | Mod: ,,, | Performed by: STUDENT IN AN ORGANIZED HEALTH CARE EDUCATION/TRAINING PROGRAM

## 2020-03-15 PROCEDURE — 36415 COLL VENOUS BLD VENIPUNCTURE: CPT

## 2020-03-15 PROCEDURE — 85025 COMPLETE CBC W/AUTO DIFF WBC: CPT

## 2020-03-15 PROCEDURE — 99233 PR SUBSEQUENT HOSPITAL CARE,LEVL III: ICD-10-PCS | Mod: ,,, | Performed by: INTERNAL MEDICINE

## 2020-03-15 PROCEDURE — 27000221 HC OXYGEN, UP TO 24 HOURS

## 2020-03-15 PROCEDURE — 84100 ASSAY OF PHOSPHORUS: CPT

## 2020-03-15 PROCEDURE — 99233 SBSQ HOSP IP/OBS HIGH 50: CPT | Mod: ,,, | Performed by: STUDENT IN AN ORGANIZED HEALTH CARE EDUCATION/TRAINING PROGRAM

## 2020-03-15 PROCEDURE — 81001 URINALYSIS AUTO W/SCOPE: CPT

## 2020-03-15 PROCEDURE — U0002 COVID-19 LAB TEST NON-CDC: HCPCS

## 2020-03-15 PROCEDURE — 83735 ASSAY OF MAGNESIUM: CPT

## 2020-03-15 PROCEDURE — 99233 SBSQ HOSP IP/OBS HIGH 50: CPT | Mod: ,,, | Performed by: INTERNAL MEDICINE

## 2020-03-15 RX ORDER — SODIUM CHLORIDE 450 MG/100ML
INJECTION, SOLUTION INTRAVENOUS CONTINUOUS
Status: DISCONTINUED | OUTPATIENT
Start: 2020-03-15 | End: 2020-03-15

## 2020-03-15 RX ADMIN — PIPERACILLIN AND TAZOBACTAM 4.5 G: 4; .5 INJECTION, POWDER, LYOPHILIZED, FOR SOLUTION INTRAVENOUS; PARENTERAL at 06:03

## 2020-03-15 RX ADMIN — ATORVASTATIN CALCIUM 80 MG: 20 TABLET, FILM COATED ORAL at 08:03

## 2020-03-15 RX ADMIN — Medication 1750 MG: at 10:03

## 2020-03-15 RX ADMIN — Medication 400 MG: at 08:03

## 2020-03-15 RX ADMIN — SODIUM CHLORIDE 500 ML: 0.9 INJECTION, SOLUTION INTRAVENOUS at 11:03

## 2020-03-15 RX ADMIN — HEPARIN SODIUM 5000 UNITS: 5000 INJECTION, SOLUTION INTRAVENOUS; SUBCUTANEOUS at 08:03

## 2020-03-15 RX ADMIN — HEPARIN SODIUM 5000 UNITS: 5000 INJECTION, SOLUTION INTRAVENOUS; SUBCUTANEOUS at 10:03

## 2020-03-15 RX ADMIN — PIPERACILLIN AND TAZOBACTAM 4.5 G: 4; .5 INJECTION, POWDER, LYOPHILIZED, FOR SOLUTION INTRAVENOUS; PARENTERAL at 02:03

## 2020-03-15 RX ADMIN — LOSARTAN POTASSIUM 12.5 MG: 25 TABLET, FILM COATED ORAL at 08:03

## 2020-03-15 RX ADMIN — PIPERACILLIN AND TAZOBACTAM 4.5 G: 4; .5 INJECTION, POWDER, LYOPHILIZED, FOR SOLUTION INTRAVENOUS; PARENTERAL at 12:03

## 2020-03-15 NOTE — SUBJECTIVE & OBJECTIVE
Interval History:Febrile overnight and up to 5L on NC.  Reports some SOB this morning which is new.  Otherwise no new complaints.  Discussed concern for viral infection.       Review of Systems   Constitutional: Positive for fatigue and fever.   HENT: Positive for congestion.    Respiratory: Positive for chest tightness and shortness of breath. Negative for apnea, cough and wheezing.    Cardiovascular: Positive for leg swelling. Negative for chest pain and palpitations.   Gastrointestinal: Positive for nausea and vomiting. Negative for blood in stool and diarrhea.   Genitourinary: Negative for flank pain and hematuria.   Musculoskeletal: Negative for arthralgias, back pain, myalgias and neck pain.   Neurological: Negative for dizziness, weakness, light-headedness, numbness and headaches.   Hematological: Negative for adenopathy.     Objective:     Vital Signs (Most Recent):  Temp: 99.1 °F (37.3 °C) (03/15/20 1136)  Pulse: 101 (03/15/20 1136)  Resp: 18 (03/15/20 1136)  BP: (!) 159/83 (03/15/20 1136)  SpO2: (!) 94 % (03/15/20 1136) Vital Signs (24h Range):  Temp:  [99.1 °F (37.3 °C)-103.2 °F (39.6 °C)] 99.1 °F (37.3 °C)  Pulse:  [101-114] 101  Resp:  [18-22] 18  SpO2:  [85 %-94 %] 94 %  BP: (116-171)/(67-90) 159/83     Weight: 94.8 kg (209 lb)  Body mass index is 27.57 kg/m².  No intake or output data in the 24 hours ending 03/15/20 1328   Physical Exam   Constitutional: He is oriented to person, place, and time. He appears well-developed and well-nourished. No distress.   NAD     HENT:   Head: Normocephalic and atraumatic.   Eyes: Pupils are equal, round, and reactive to light. Conjunctivae and EOM are normal.   Neck: Normal range of motion.   Cardiovascular: Normal rate, regular rhythm and normal heart sounds.   No murmur heard.  Pulmonary/Chest: Effort normal. No respiratory distress. He has no wheezes. He has rales (Bilateral upper and lower lobes).   4L NC     Abdominal: Soft. Bowel sounds are normal. He exhibits  no distension. There is no tenderness.   Musculoskeletal: He exhibits no edema or deformity.   Lymphadenopathy:     He has no cervical adenopathy.   Neurological: He is alert and oriented to person, place, and time.   Skin: Skin is warm. Capillary refill takes less than 2 seconds. He is not diaphoretic.   Psychiatric: He has a normal mood and affect. His behavior is normal.       Significant Labs:   BMP:   Recent Labs   Lab 03/15/20  0404   *   *   K 3.8   CL 94*   CO2 31*   BUN 9   CREATININE 1.5*   CALCIUM 8.0*   MG 2.8*     CBC:   Recent Labs   Lab 03/14/20  0355 03/15/20  0404   WBC 7.32 8.18   HGB 10.1* 11.5*   HCT 33.2* 37.8*    233     Respiratory Culture:   Recent Labs   Lab 03/14/20  1339   GSRESP <10 epithelial cells per low power field.  No WBC's  Many Gram positive cocci  Few Gram negative rods   RESPIRATORYC Normal respiratory janes     Urine Culture: No results for input(s): LABURIN in the last 48 hours.    Significant Imaging: I have reviewed all pertinent imaging results/findings within the past 24 hours.

## 2020-03-15 NOTE — PROGRESS NOTES
[facility] Hospital Medicine  Progress Note    Patient Name: Indio Ryder Jr.  MRN: [id number]  Patient Class: IP- Inpatient   Admission Date: 3/10/2020  Length of Stay: 5 days  Attending Physician: Roberta Reed MD  Primary Care Provider: Lorena Thakur MD    [facility] Medicine Team: [facility] HOSP MED V Antonio Valladares MD    Subjective:     Principal Problem:Suspected Covid-19 Virus Infection        HPI:  Mr. Ryder is 50 y/o with a history of poorly controlled DM2 ( last Ha1c 8.4 ) , DKA with coma, diabetic neuropathy, diabatic retinopathy and diabetic foot with multiple amputation who presents to ED with chief complaint of chills associated with nausea and vomiting for one day. He sates that yesterday he started to feel ill and he has been having chills associated with nauseas and vomiting. He also endorses poor appetite with decreased oral intake for the last week. Denies fever, abdominal pain, feet or leg pain, leg swelling, CP, SOB, dysuria. He a history of poorly control diabetes with amputation of his right big and 5th toes. He undergone left big toe detriment and imputation on 7/14/2019. He reports not feeling well for the last couple of weeks and he felt about to pass out on Mardi gras and his BP was in 90/50. He was seen by podiatry last  Thursday when he had his wound opened and he was prescribed doxycycline for 10 days.     In ED, he was afebrile, vitally stable. Lab, BS: 382, B-hydroxybutyrate 3.6, HCO3 22,  anion gap 18. WBC: 6.9, lactate: 1.2  X-ray of right food shows soft tissue edema concerning for osteomyelitis. He received single dose of vanc/zosyn and 2L IVF.     Overview/Hospital Course:  Patient with a history of DM2, DKA with coma, osteomyelitis, multiple amputation who present with chills, N/V and found with DKA. Started on insuline gtt and IVF infusion. Pt is currently treated with doxycyline for wound in left foot. ID and podiatry consulted. MRI with acute  osteomyelitis of 4th metatarsal head and neck of left proximal phalanx. ID recommended cefazolin and podiatry plan for for amputation. Pt is refusing amputation but agree to have agressive washout if he keep spiking fever. Pt transitioned to subQ insulin and tolerating PO. Blood ux grwe Staph coagulase negative, TTE done and shows normal valves with no vegetations. Pt had drop in his SpO2 to 85 with need for 5L oxygen. Respiratory cx with many GPC and CXR shows b/l patchy infiltration. Pt still spiking fever and in acute respiratory failure. Concern for COVID-19, test is sent.     Interval History:Febrile overnight and up to 5L on NC.  Reports some SOB this morning which is new.  Otherwise no new complaints.  Discussed concern for viral infection.       Review of Systems   Constitutional: Positive for fatigue and fever.   HENT: Positive for congestion.    Respiratory: Positive for chest tightness and shortness of breath. Negative for apnea, cough and wheezing.    Cardiovascular: Positive for leg swelling. Negative for chest pain and palpitations.   Gastrointestinal: Positive for nausea and vomiting. Negative for blood in stool and diarrhea.   Genitourinary: Negative for flank pain and hematuria.   Musculoskeletal: Negative for arthralgias, back pain, myalgias and neck pain.   Neurological: Negative for dizziness, weakness, light-headedness, numbness and headaches.   Hematological: Negative for adenopathy.     Objective:     Vital Signs (Most Recent):  Temp: 99.1 °F (37.3 °C) (03/15/20 1136)  Pulse: 101 (03/15/20 1136)  Resp: 18 (03/15/20 1136)  BP: (!) 159/83 (03/15/20 1136)  SpO2: (!) 94 % (03/15/20 1136) Vital Signs (24h Range):  Temp:  [99.1 °F (37.3 °C)-103.2 °F (39.6 °C)] 99.1 °F (37.3 °C)  Pulse:  [101-114] 101  Resp:  [18-22] 18  SpO2:  [85 %-94 %] 94 %  BP: (116-171)/(67-90) 159/83     Weight: 94.8 kg (209 lb)  Body mass index is 27.57 kg/m².  No intake or output data in the 24 hours ending 03/15/20 6264    Physical Exam   Constitutional: He is oriented to person, place, and time. He appears well-developed and well-nourished. No distress.   NAD     HENT:   Head: Normocephalic and atraumatic.   Eyes: Pupils are equal, round, and reactive to light. Conjunctivae and EOM are normal.   Neck: Normal range of motion.   Cardiovascular: Normal rate, regular rhythm and normal heart sounds.   No murmur heard.  Pulmonary/Chest: Effort normal. No respiratory distress. He has no wheezes. He has rales (Bilateral upper and lower lobes).   4L NC     Abdominal: Soft. Bowel sounds are normal. He exhibits no distension. There is no tenderness.   Musculoskeletal: He exhibits no edema or deformity.   Lymphadenopathy:     He has no cervical adenopathy.   Neurological: He is alert and oriented to person, place, and time.   Skin: Skin is warm. Capillary refill takes less than 2 seconds. He is not diaphoretic.   Psychiatric: He has a normal mood and affect. His behavior is normal.       Significant Labs:   BMP:   Recent Labs   Lab 03/15/20  0404   *   *   K 3.8   CL 94*   CO2 31*   BUN 9   CREATININE 1.5*   CALCIUM 8.0*   MG 2.8*     CBC:   Recent Labs   Lab 03/14/20  0355 03/15/20  0404   WBC 7.32 8.18   HGB 10.1* 11.5*   HCT 33.2* 37.8*    233     Respiratory Culture:   Recent Labs   Lab 03/14/20  1339   GSRESP <10 epithelial cells per low power field.  No WBC's  Many Gram positive cocci  Few Gram negative rods   RESPIRATORYC Normal respiratory janes     Urine Culture: No results for input(s): LABURIN in the last 48 hours.    Significant Imaging: I have reviewed all pertinent imaging results/findings within the past 24 hours.      Assessment/Plan:      * Suspected Covid-19 Virus Infection  Presented with DKA and diabetic foot ulcer complicated by osteomyelitis now with worsening fevers and acute hypoxic resp failure despite appropriate antibiotic therapy, Resp gram stain remarkable for gram positive cocci   - No known  exposures with the exception of hospital       Acute hypoxemic respiratory failure  Initial concern for aspiration event but CXR is more consistent with multifocal PNA vs viral infection.  Placed on Vanc and Zosyn, Follow up with gram stain, Gram positive cocci noted in sputum.  Some concern for Covid given high fevers, Flu and viral panel negative          Diabetic ulcer of left midfoot associated with type 2 diabetes mellitus, with bone involvement without evidence of necrosis  Poorly controlled DM, podiatry and ID following, previously on Ancef but still febrile, now on Zosyn and Vanc,  Refuses amputation       Sepsis with acute organ dysfunction  Complicated, osteomyelitis with suspected Staph PNA and concern for COVID.  - Continue Vanc and zosyn.  IVFs with caution     Osteomyelitis of left foot  See diabetic ulcer       TAHIR (acute kidney injury)  Likely sepsis related complicated by poor po intake,  IVFs with caution given resp failure.        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  ID following       Impaired gait and mobility        Hypokalemia  Replete PRN       Mixed hyperlipidemia    - continue home Atorvastatin 80 mg      Essential hypertension    - pt was started on losartan   - it was stopped because of hypotension episode   - keep holding     VTE Risk Mitigation (From  admission, onward)         Ordered     heparin (porcine) injection 5,000 Units  Every 12 hours      03/12/20 1648     Place TEN hose  Until discontinued      03/10/20 1150     IP VTE LOW RISK PATIENT  Once      03/10/20 1150                Dispo: Pending improvement, expect home with home health with long term abx or if amendable to amputation SNF placement.        Antonio Valladares MD  Department of Hospital Medicine   [facility]

## 2020-03-15 NOTE — NURSING
Applied ice packs and cooling blanket on patient per MD order for temp of 103.1 F. Tylenol was given at approx 2100 with no relief. MALAIKA.

## 2020-03-15 NOTE — PROGRESS NOTES
Pharmacokinetic Assessment Follow Up: IV Vancomycin    Vancomycin assessmen/plan:   Patient developed TAHIR (Scr-1.5 up form 0.9 yesterday)   Holding vancomycin, will dose per level for now   Vanco random level scheduled for 3/16 @ 22:00   Treating PNA, osteo, vanco tr goal 15-20     Drug levels (last 3 results):  No results for input(s): VANCOMYCINRA, VANCOMYCINPE, VANCOMYCINTR, VANCOTROUGH in the last 72 hours.    Pharmacy will continue to follow and monitor vancomycin.    Please contact pharmacy at extension [phone] for questions regarding this assessment.    Thank you for the consult,   Chrissy Walls       Patient brief summary:  Indio Ryder Jr. is a 49 y.o. male initiated on antimicrobial therapy with IV Vancomycin for treatment of bone/joint infection  And PNA     Drug Allergies:   Review of patient's allergies indicates:  No Known Allergies    Renal Function:   Estimated Creatinine Clearance: 65.8 mL/min (A) (based on SCr of 1.5 mg/dL (H)).,     CBC (last 72 hours):  Recent Labs   Lab Result Units 03/12/20  1224 03/13/20  0621 03/14/20  0355 03/15/20  0404   WBC K/uL  --  5.70 7.32 8.18   Hemoglobin g/dL  --  10.4* 10.1* 11.5*   Hemoglobin A1C % 12.0*  --   --   --    Hematocrit %  --  34.4* 33.2* 37.8*   Platelets K/uL  --  222 185 233   Gran% %  --  81.1* 77.4* 75.7*   Lymph% %  --  15.3* 18.9 19.6   Mono% %  --  3.0* 3.1* 3.9*   Eosinophil% %  --  0.0 0.0 0.0   Basophil% %  --  0.2 0.1 0.2   Differential Method   --  Automated Automated Automated       Metabolic Panel (last 72 hours):  Recent Labs   Lab Result Units 03/12/20  1224 03/12/20  1604 03/12/20  1938 03/13/20  0012 03/13/20  0100 03/13/20  0621 03/13/20  2307 03/14/20  0355 03/14/20  0913 03/15/20  0404   Sodium mmol/L 144 141 138 138  --  139 137  --  134* 133*   Potassium mmol/L 3.4* 3.2* 4.4 4.0  --  3.3* 3.2*  --  3.4* 3.8   Chloride mmol/L 106 106 103 103  --  103 99  --  99 94*   CO2 mmol/L 29 24 29 27  --  27 29  --  27 31*    Glucose mg/dL 162* 163* 326* 368*  --  231* 184*  --  234* 138*   Glucose, UA   --   --   --   --  Negative  --   --   --   --   --    BUN, Bld mg/dL 3* 3* 3* 4*  --  5* 7  --  8 9   Creatinine mg/dL 0.9 0.9 1.1 1.0  --  0.9 1.0  --  0.9 1.5*   Albumin g/dL 2.2* 2.1* 2.2* 1.8*  --  1.9*  --   --  1.8* 1.9*   Total Bilirubin mg/dL 0.3 0.3 0.3 0.3  --  0.3  --   --  0.3 0.3   Alkaline Phosphatase U/L 68 67 70 65  --  62  --   --  65 84   AST U/L 14 16 16 17  --  17  --   --  17 25   ALT U/L 7* 6* 6* 5*  --  <5*  --   --  <5* <5*   Magnesium mg/dL  --   --   --   --   --  1.7  --  1.3*  --  2.8*   Phosphorus mg/dL 2.2* 2.2* 2.7 2.1*  --  1.9*  --   --   --  3.1       Vancomycin Administrations:  vancomycin given in the last 96 hours                   vancomycin 1.75 g in 5 % dextrose 500 mL IVPB (mg) 1,750 mg New Bag 03/15/20 1047    vancomycin (VANCOCIN) 2,250 mg in dextrose 5 % 500 mL IVPB (mg) 2,250 mg New Bag 03/14/20 2122                Microbiologic Results:  Microbiology Results (last 7 days)     Procedure Component Value Units Date/Time    Culture, Respiratory with Gram Stain [[id number]] Collected:  03/14/20 1339    Order Status:  Completed Specimen:  Respiratory from Sputum, Expectorated Updated:  03/15/20 1030     Respiratory Culture Normal respiratory janes     Gram Stain (Respiratory) <10 epithelial cells per low power field.     Gram Stain (Respiratory) No WBC's     Gram Stain (Respiratory) Many Gram positive cocci     Gram Stain (Respiratory) Few Gram negative rods    Blood culture [[id number]] Collected:  03/14/20 0109    Order Status:  Completed Specimen:  Blood Updated:  03/15/20 0613     Blood Culture, Routine No Growth to date      No Growth to date    Blood culture [[id number]] Collected:  03/14/20 0109    Order Status:  Completed Specimen:  Blood Updated:  03/15/20 0613     Blood Culture, Routine No Growth to date      No Growth to date    Blood culture [[id number]] Collected:  03/12/20 2108     Order Status:  Completed Specimen:  Blood Updated:  03/14/20 2212     Blood Culture, Routine No Growth to date      No Growth to date      No Growth to date    Blood culture [[id number]] Collected:  03/10/20 1057    Order Status:  Completed Specimen:  Blood from Peripheral, Antecubital, Left Updated:  03/14/20 1612     Blood Culture, Routine No Growth to date      No Growth to date      No Growth to date      No Growth to date      No Growth to date    Blood culture [[id number]] Collected:  03/11/20 1156    Order Status:  Completed Specimen:  Blood Updated:  03/14/20 1412     Blood Culture, Routine No Growth to date      No Growth to date      No Growth to date      No Growth to date    Respiratory Infection Panel (PCR), Nasopharyngeal [[id number]] Collected:  03/14/20 0306    Order Status:  Completed Specimen:  Nasopharyngeal Swab Updated:  03/14/20 1300     Respiratory Infection Panel Source NP Swab     Adenovirus Not Detected     Coronavirus 229E, Common Cold Virus Not Detected     Coronavirus HKU1, Common Cold Virus Not Detected     Coronavirus NL63, Common Cold Virus Not Detected     Coronavirus OC43, Common Cold Virus Not Detected     Comment: The Coronavirus strains detected in this test cause the common cold.  These strains are not the COVID-19 (novel Coronavirus)strain   associated with the respiratory disease outbreak.          Human Metapneumovirus Not Detected     Human Rhinovirus/Enterovirus Not Detected     Influenza A (subtypes H1, H1-2009,H3) Not Detected     Influenza B Not Detected     Parainfluenza Virus 1 Not Detected     Parainfluenza Virus 2 Not Detected     Parainfluenza Virus 3 Not Detected     Parainfluenza Virus 4 Not Detected     Respiratory Syncytial Virus Not Detected     Bordetella Parapertussis (SG9287) Not Detected     Bordetella pertussis (ptxP) Not Detected     Chlamydia pneumoniae Not Detected     Mycoplasma pneumoniae Not Detected     Comment: Respiratory Infection Panel testing  performed by Multiplex PCR.       Narrative:       For all other respiratory sources, order [id number] -  Respiratory Viral Panel by PCR    Aerobic culture [[id number]]  (Abnormal) Collected:  03/12/20 1726    Order Status:  Completed Specimen:  Wound from Foot, Left Updated:  03/14/20 1106     Aerobic Bacterial Culture STAPHYLOCOCCUS AUREUS  Moderate  Susceptibility pending      Influenza A & B by Molecular [[id number]] Collected:  03/14/20 1022    Order Status:  Completed Specimen:  Nasopharyngeal Swab Updated:  03/14/20 1054     Influenza A, Molecular Negative     Influenza B, Molecular Negative     Flu A & B Source Nasal swab    Culture, Respiratory with Gram Stain [[id number]]     Order Status:  Canceled Specimen:  Respiratory     Blood culture [[id number]]  (Abnormal) Collected:  03/10/20 1213    Order Status:  Completed Specimen:  Blood from Peripheral, Wrist, Right Updated:  03/13/20 1455     Blood Culture, Routine Gram stain aer bottle: Gram positive cocci in clusters resembling Staph       Results called to and read back by: Erma Daniel RN 03/11/2020        14:54      Gram stain mary bottle: Gram positive cocci in clusters resembling Staph      COAGULASE-NEGATIVE STAPHYLOCOCCUS SPECIES  Multiple morphotyes - probable contaminates      Culture, Anaerobe [[id number]] Collected:  03/12/20 1726    Order Status:  Sent Specimen:  Wound from Foot, Left Updated:  03/12/20 2123

## 2020-03-15 NOTE — ASSESSMENT & PLAN NOTE
48 y/o with a history of poorly controlled DM2 ,diabetic neuropathy, diabatic retinopathy and diabetic foot with multiple amputation who presents to ED with chief complaint of chills associated with nausea and vomiting one day prior to presentation    - Now resolved s/p insulin drip

## 2020-03-15 NOTE — ASSESSMENT & PLAN NOTE
Poorly controlled DM, podiatry and ID following, previously on Ancef but still febrile, now on Zosyn and Vanc,  Refuses amputation

## 2020-03-15 NOTE — PLAN OF CARE
Hospital Medicine Brief Note    Patient accepted to Hosp Med V for COVID rule out    Roberta Reed M.D., M.P.H.  Department of [facility] Medicine  [facility]  (pager) [phone] (spect) [phone]

## 2020-03-15 NOTE — CARE UPDATE
Rapid Response Nurse Chart Check     Chart check completed, abnormal VS noted. Bedside RN Shirley contacted, no concerns verbalized at this time, instructed to call [phone] for further concerns or assistance.    Upon entering room, lights out, patient asleep, antibiotics infusing. RN states patient has been spiking temps with tachycardia. Fever of unknown source. MD aware.

## 2020-03-15 NOTE — SUBJECTIVE & OBJECTIVE
Interval History:     NO adverse events.  Temperatures are slightly better.    Review of Systems   Constitutional: Positive for fatigue and fever. Negative for activity change, appetite change, chills and diaphoresis.   Respiratory: Positive for cough. Negative for shortness of breath.    Cardiovascular: Positive for leg swelling.   Gastrointestinal: Positive for nausea. Negative for abdominal pain, constipation, diarrhea and vomiting.   Genitourinary: Negative for dysuria.   Musculoskeletal: Negative for back pain.   Skin: Positive for wound. Negative for color change, pallor and rash.   Neurological: Negative for dizziness, light-headedness and headaches.   All other systems reviewed and are negative.    Objective:     Vital Signs (Most Recent):  Temp: 99.8 °F (37.7 °C) (03/15/20 1643)  Pulse: 102 (03/15/20 1643)  Resp: 18 (03/15/20 1643)  BP: 135/71 (03/15/20 1643)  SpO2: (!) 92 % (03/15/20 1737) Vital Signs (24h Range):  Temp:  [99.1 °F (37.3 °C)-103.2 °F (39.6 °C)] 99.8 °F (37.7 °C)  Pulse:  [101-114] 102  Resp:  [18-22] 18  SpO2:  [85 %-94 %] 92 %  BP: (116-171)/(67-88) 135/71     Weight: 94.8 kg (209 lb)  Body mass index is 27.57 kg/m².    Estimated Creatinine Clearance: 65.8 mL/min (A) (based on SCr of 1.5 mg/dL (H)).    Physical Exam   Constitutional: He appears well-developed and well-nourished. No distress.   HENT:   Head: Normocephalic.   Eyes: Pupils are equal, round, and reactive to light.   Cardiovascular: Normal rate, regular rhythm, normal heart sounds and intact distal pulses.   No murmur heard.  Pulmonary/Chest: Effort normal. No stridor. No respiratory distress. He has no wheezes.   Abdominal: Soft. He exhibits no distension. There is no tenderness.   Musculoskeletal: Normal range of motion. He exhibits edema. He exhibits no tenderness or deformity.   Neurological: He is alert.   Skin: Skin is warm and dry. He is not diaphoretic. There is erythema.   Psychiatric: He has a normal mood and affect.    Vitals reviewed.      Significant Labs:   Microbiology Results (last 7 days)     Procedure Component Value Units Date/Time    Blood culture [[id number]] Collected:  03/10/20 1057    Order Status:  Completed Specimen:  Blood from Peripheral, Antecubital, Left Updated:  03/15/20 1612     Blood Culture, Routine No growth after 5 days.    Blood culture [[id number]] Collected:  03/11/20 1156    Order Status:  Completed Specimen:  Blood Updated:  03/15/20 1412     Blood Culture, Routine No Growth to date      No Growth to date      No Growth to date      No Growth to date      No Growth to date    Aerobic culture [[id number]]  (Abnormal)  (Susceptibility) Collected:  03/12/20 1726    Order Status:  Completed Specimen:  Wound from Foot, Left Updated:  03/15/20 1323     Aerobic Bacterial Culture STAPHYLOCOCCUS AUREUS  Moderate      Culture, Respiratory with Gram Stain [[id number]] Collected:  03/14/20 1339    Order Status:  Completed Specimen:  Respiratory from Sputum, Expectorated Updated:  03/15/20 1030     Respiratory Culture Normal respiratory janes     Gram Stain (Respiratory) <10 epithelial cells per low power field.     Gram Stain (Respiratory) No WBC's     Gram Stain (Respiratory) Many Gram positive cocci     Gram Stain (Respiratory) Few Gram negative rods    Blood culture [[id number]] Collected:  03/14/20 0109    Order Status:  Completed Specimen:  Blood Updated:  03/15/20 0613     Blood Culture, Routine No Growth to date      No Growth to date    Blood culture [[id number]] Collected:  03/14/20 0109    Order Status:  Completed Specimen:  Blood Updated:  03/15/20 0613     Blood Culture, Routine No Growth to date      No Growth to date    Blood culture [[id number]] Collected:  03/12/20 2108    Order Status:  Completed Specimen:  Blood Updated:  03/14/20 2212     Blood Culture, Routine No Growth to date      No Growth to date      No Growth to date    Respiratory Infection Panel (PCR), Nasopharyngeal [[id number]] Collected:   03/14/20 0306    Order Status:  Completed Specimen:  Nasopharyngeal Swab Updated:  03/14/20 1300     Respiratory Infection Panel Source NP Swab     Adenovirus Not Detected     Coronavirus 229E, Common Cold Virus Not Detected     Coronavirus HKU1, Common Cold Virus Not Detected     Coronavirus NL63, Common Cold Virus Not Detected     Coronavirus OC43, Common Cold Virus Not Detected     Comment: The Coronavirus strains detected in this test cause the common cold.  These strains are not the COVID-19 (novel Coronavirus)strain   associated with the respiratory disease outbreak.          Human Metapneumovirus Not Detected     Human Rhinovirus/Enterovirus Not Detected     Influenza A (subtypes H1, H1-2009,H3) Not Detected     Influenza B Not Detected     Parainfluenza Virus 1 Not Detected     Parainfluenza Virus 2 Not Detected     Parainfluenza Virus 3 Not Detected     Parainfluenza Virus 4 Not Detected     Respiratory Syncytial Virus Not Detected     Bordetella Parapertussis (YR4707) Not Detected     Bordetella pertussis (ptxP) Not Detected     Chlamydia pneumoniae Not Detected     Mycoplasma pneumoniae Not Detected     Comment: Respiratory Infection Panel testing performed by Multiplex PCR.       Narrative:       For all other respiratory sources, order [id number] -  Respiratory Viral Panel by PCR    Influenza A & B by Molecular [[id number]] Collected:  03/14/20 1022    Order Status:  Completed Specimen:  Nasopharyngeal Swab Updated:  03/14/20 1054     Influenza A, Molecular Negative     Influenza B, Molecular Negative     Flu A & B Source Nasal swab    Culture, Respiratory with Gram Stain [[id number]]     Order Status:  Canceled Specimen:  Respiratory     Blood culture [[id number]]  (Abnormal) Collected:  03/10/20 1213    Order Status:  Completed Specimen:  Blood from Peripheral, Wrist, Right Updated:  03/13/20 1455     Blood Culture, Routine Gram stain aer bottle: Gram positive cocci in clusters resembling Staph        Results called to and read back by: Erma Daniel RN 03/11/2020        14:54      Gram stain mary bottle: Gram positive cocci in clusters resembling Staph      COAGULASE-NEGATIVE STAPHYLOCOCCUS SPECIES  Multiple morphotyes - probable contaminates      Culture, Anaerobe [[id number]] Collected:  03/12/20 1726    Order Status:  Sent Specimen:  Wound from Foot, Left Updated:  03/12/20 2123          Significant Imaging: I have reviewed all pertinent imaging results/findings within the past 24 hours.

## 2020-03-15 NOTE — PLAN OF CARE
Problem: Adult Inpatient Plan of Care  Goal: Plan of Care Review  Outcome: Ongoing, Progressing  Patient spiked a temp of 103.1 F again tonight. No relief obtained with Tylenol PRN. After ice packs and cooling blanket were applied, patient's fever broke and temp is currently 99.8 F. Denied N/V and pain. Patient stated overall he feels tired. Zosyn, Vancomycin and Magnesium 2 mg x2 administered per order. Dressing to foot C/D/I. SpO2 90-93% on 2 L NC. Frequent rounding maintained. WCTM.

## 2020-03-15 NOTE — ASSESSMENT & PLAN NOTE
Initial concern for aspiration event but CXR is more consistent with multifocal PNA vs viral infection.  Placed on Vanc and Zosyn, Follow up with gram stain, Gram positive cocci noted in sputum.  Some concern for Covid given high fevers, Flu and viral panel negative

## 2020-03-15 NOTE — PROGRESS NOTES
Pharmacokinetic Initial Assessment: IV Vancomycin  · Initiated vancomycin 2250 mg loading dose, followed by 1750 mg q12hr maintenance dose  · Draw trough on 3/16 at 08:30 prior to 4th dose  · Goal 15-20 mcg/mL     Pharmacy will continue to follow and monitor vancomycin.      Please contact pharmacy at extension [phone] with any questions regarding this assessment.     Thank you for the consult,   Deborah Pablo       Patient brief summary:  Indio Ryder Jr. is a 49 y.o. male initiated on antimicrobial therapy with IV Vancomycin for treatment of suspected lower respiratory infection    Drug Allergies:   Review of patient's allergies indicates:  No Known Allergies    Actual Body Weight:   94.8 kg    Renal Function:   Estimated Creatinine Clearance: 109.7 mL/min (based on SCr of 0.9 mg/dL).,     Dialysis Method (if applicable):  N/A    CBC (last 72 hours):  Recent Labs   Lab Result Units 03/12/20  0436 03/12/20  1224 03/13/20  0621 03/14/20  0355   WBC K/uL 4.58  --  5.70 7.32   Hemoglobin g/dL 10.6*  --  10.4* 10.1*   Hemoglobin A1C %  --  12.0*  --   --    Hematocrit % 35.3*  --  34.4* 33.2*   Platelets K/uL 235  --  222 185   Gran% % 69.4  --  81.1* 77.4*   Lymph% % 21.4  --  15.3* 18.9   Mono% % 8.5  --  3.0* 3.1*   Eosinophil% % 0.0  --  0.0 0.0   Basophil% % 0.0  --  0.2 0.1   Differential Method  Automated  --  Automated Automated       Metabolic Panel (last 72 hours):  Recent Labs   Lab Result Units 03/11/20  2031 03/12/20  0016 03/12/20  0436 03/12/20  0858 03/12/20  1224 03/12/20  1604 03/12/20  1938 03/13/20  0012 03/13/20  0100 03/13/20  0621 03/13/20  2307 03/14/20  0355 03/14/20  0913   Sodium mmol/L 144 143 143 144 144 141 138 138  --  139 137  --  134*   Potassium mmol/L 4.4 3.2* 3.4* 3.4* 3.4* 3.2* 4.4 4.0  --  3.3* 3.2*  --  3.4*   Chloride mmol/L 110 109 108 108 106 106 103 103  --  103 99  --  99   CO2 mmol/L 21* 25 27 27 29 24 29 27  --  27 29  --  27   Glucose mg/dL 186* 206* 205* 196* 162* 163*  326* 368*  --  231* 184*  --  234*   Glucose, UA   --   --   --   --   --   --   --   --  Negative  --   --   --   --    BUN, Bld mg/dL 5* 5* 4* 3* 3* 3* 3* 4*  --  5* 7  --  8   Creatinine mg/dL 1.0 1.0 1.0 0.9 0.9 0.9 1.1 1.0  --  0.9 1.0  --  0.9   Albumin g/dL 2.2* 2.1* 2.1* 2.0* 2.2* 2.1* 2.2* 1.8*  --  1.9*  --   --  1.8*   Total Bilirubin mg/dL 0.3 0.2 0.2 0.2 0.3 0.3 0.3 0.3  --  0.3  --   --  0.3   Alkaline Phosphatase U/L 73 70 70 65 68 67 70 65  --  62  --   --  65   AST U/L 18 13 14 13 14 16 16 17  --  17  --   --  17   ALT U/L 8* 7* 7* 6* 7* 6* 6* 5*  --  <5*  --   --  <5*   Magnesium mg/dL  --   --  1.5*  --   --   --   --   --   --  1.7  --  1.3*  --    Phosphorus mg/dL 2.5* 2.2* 2.3* 2.1* 2.2* 2.2* 2.7 2.1*  --  1.9*  --   --   --        Drug levels (last 3 results):  No results for input(s): VANCOMYCINRA, VANCOMYCINPE, VANCOMYCINTR in the last 72 hours.    Microbiologic Results:  Microbiology Results (last 7 days)     Procedure Component Value Units Date/Time    Blood culture [[id number]] Collected:  03/10/20 1057    Order Status:  Completed Specimen:  Blood from Peripheral, Antecubital, Left Updated:  03/14/20 1612     Blood Culture, Routine No Growth to date      No Growth to date      No Growth to date      No Growth to date      No Growth to date    Culture, Respiratory with Gram Stain [[id number]] Collected:  03/14/20 1339    Order Status:  Completed Specimen:  Respiratory from Sputum, Expectorated Updated:  03/14/20 1502     Gram Stain (Respiratory) <10 epithelial cells per low power field.     Gram Stain (Respiratory) No WBC's     Gram Stain (Respiratory) Many Gram positive cocci     Gram Stain (Respiratory) Few Gram negative rods    Blood culture [[id number]] Collected:  03/11/20 1156    Order Status:  Completed Specimen:  Blood Updated:  03/14/20 1412     Blood Culture, Routine No Growth to date      No Growth to date      No Growth to date      No Growth to date    Respiratory Infection Panel  (PCR), Nasopharyngeal [[id number]] Collected:  03/14/20 0306    Order Status:  Completed Specimen:  Nasopharyngeal Swab Updated:  03/14/20 1300     Respiratory Infection Panel Source NP Swab     Adenovirus Not Detected     Coronavirus 229E, Common Cold Virus Not Detected     Coronavirus HKU1, Common Cold Virus Not Detected     Coronavirus NL63, Common Cold Virus Not Detected     Coronavirus OC43, Common Cold Virus Not Detected     Comment: The Coronavirus strains detected in this test cause the common cold.  These strains are not the COVID-19 (novel Coronavirus)strain   associated with the respiratory disease outbreak.          Human Metapneumovirus Not Detected     Human Rhinovirus/Enterovirus Not Detected     Influenza A (subtypes H1, H1-2009,H3) Not Detected     Influenza B Not Detected     Parainfluenza Virus 1 Not Detected     Parainfluenza Virus 2 Not Detected     Parainfluenza Virus 3 Not Detected     Parainfluenza Virus 4 Not Detected     Respiratory Syncytial Virus Not Detected     Bordetella Parapertussis (RZ2329) Not Detected     Bordetella pertussis (ptxP) Not Detected     Chlamydia pneumoniae Not Detected     Mycoplasma pneumoniae Not Detected     Comment: Respiratory Infection Panel testing performed by Multiplex PCR.       Narrative:       For all other respiratory sources, order [id number] -  Respiratory Viral Panel by PCR    Aerobic culture [[id number]]  (Abnormal) Collected:  03/12/20 1726    Order Status:  Completed Specimen:  Wound from Foot, Left Updated:  03/14/20 1106     Aerobic Bacterial Culture STAPHYLOCOCCUS AUREUS  Moderate  Susceptibility pending      Influenza A & B by Molecular [[id number]] Collected:  03/14/20 1022    Order Status:  Completed Specimen:  Nasopharyngeal Swab Updated:  03/14/20 1054     Influenza A, Molecular Negative     Influenza B, Molecular Negative     Flu A & B Source Nasal swab    Blood culture [[id number]] Collected:  03/14/20 0109    Order Status:  Completed Specimen:   Blood Updated:  03/14/20 0915     Blood Culture, Routine No Growth to date    Blood culture [[id number]] Collected:  03/14/20 0109    Order Status:  Completed Specimen:  Blood Updated:  03/14/20 0915     Blood Culture, Routine No Growth to date    Culture, Respiratory with Gram Stain [[id number]]     Order Status:  Canceled Specimen:  Respiratory     Blood culture [[id number]] Collected:  03/12/20 2108    Order Status:  Completed Specimen:  Blood Updated:  03/13/20 2212     Blood Culture, Routine No Growth to date      No Growth to date    Blood culture [[id number]]  (Abnormal) Collected:  03/10/20 1213    Order Status:  Completed Specimen:  Blood from Peripheral, Wrist, Right Updated:  03/13/20 1455     Blood Culture, Routine Gram stain aer bottle: Gram positive cocci in clusters resembling Staph       Results called to and read back by: Erma Daniel RN 03/11/2020        14:54      Gram stain mary bottle: Gram positive cocci in clusters resembling Staph      COAGULASE-NEGATIVE STAPHYLOCOCCUS SPECIES  Multiple morphotyes - probable contaminates      Culture, Anaerobe [[id number]] Collected:  03/12/20 1726    Order Status:  Sent Specimen:  Wound from Foot, Left Updated:  03/12/20 2123

## 2020-03-15 NOTE — ASSESSMENT & PLAN NOTE
Presented with DKA and diabetic foot ulcer complicated by osteomyelitis now with worsening fevers and acute hypoxic resp failure despite appropriate antibiotic therapy, Resp gram stain remarkable for gram positive cocci   - No known exposures with the exception of hospital

## 2020-03-15 NOTE — ASSESSMENT & PLAN NOTE
Complicated, osteomyelitis with suspected Staph PNA and concern for COVID.  - Continue Vanc and zosyn.  IVFs with caution

## 2020-03-15 NOTE — PLAN OF CARE
Pt AAOx4, VSS, denies pain. Pt placed on airborne/contact/droplet precautions today for Covid 19 r/o. Pt afebrile all day. IV antibiotics continued. Pt requiring 4L 02 at this time to keep 02 sats >89%. Will continue to monitor.

## 2020-03-16 LAB
BACTERIA BLD CULT: NORMAL
BACTERIA SPEC AEROBE CULT: ABNORMAL
BACTERIA SPEC AEROBE CULT: NORMAL
BACTERIA SPEC AEROBE CULT: NORMAL
GRAM STN SPEC: NORMAL
POCT GLUCOSE: 115 MG/DL (ref 70–110)
POCT GLUCOSE: 82 MG/DL (ref 70–110)
POCT GLUCOSE: 90 MG/DL (ref 70–110)

## 2020-03-16 PROCEDURE — 99232 PR SUBSEQUENT HOSPITAL CARE,LEVL II: ICD-10-PCS | Mod: ,,, | Performed by: INTERNAL MEDICINE

## 2020-03-16 PROCEDURE — 99232 SBSQ HOSP IP/OBS MODERATE 35: CPT | Mod: ,,, | Performed by: INTERNAL MEDICINE

## 2020-03-16 PROCEDURE — 20600001 HC STEP DOWN PRIVATE ROOM

## 2020-03-16 PROCEDURE — 99233 SBSQ HOSP IP/OBS HIGH 50: CPT | Mod: ,,, | Performed by: INTERNAL MEDICINE

## 2020-03-16 PROCEDURE — 99233 PR SUBSEQUENT HOSPITAL CARE,LEVL III: ICD-10-PCS | Mod: ,,, | Performed by: INTERNAL MEDICINE

## 2020-03-16 PROCEDURE — 63600175 PHARM REV CODE 636 W HCPCS: Performed by: INTERNAL MEDICINE

## 2020-03-16 PROCEDURE — 63600175 PHARM REV CODE 636 W HCPCS: Performed by: STUDENT IN AN ORGANIZED HEALTH CARE EDUCATION/TRAINING PROGRAM

## 2020-03-16 PROCEDURE — 25000003 PHARM REV CODE 250: Performed by: STUDENT IN AN ORGANIZED HEALTH CARE EDUCATION/TRAINING PROGRAM

## 2020-03-16 RX ORDER — ALBUTEROL SULFATE 90 UG/1
4 AEROSOL, METERED RESPIRATORY (INHALATION) EVERY 6 HOURS PRN
Status: DISCONTINUED | OUTPATIENT
Start: 2020-03-16 | End: 2020-03-30 | Stop reason: HOSPADM

## 2020-03-16 RX ORDER — ENOXAPARIN SODIUM 100 MG/ML
40 INJECTION SUBCUTANEOUS EVERY 24 HOURS
Status: DISCONTINUED | OUTPATIENT
Start: 2020-03-16 | End: 2020-03-17

## 2020-03-16 RX ADMIN — ENOXAPARIN SODIUM 40 MG: 100 INJECTION SUBCUTANEOUS at 05:03

## 2020-03-16 RX ADMIN — PIPERACILLIN AND TAZOBACTAM 4.5 G: 4; .5 INJECTION, POWDER, LYOPHILIZED, FOR SOLUTION INTRAVENOUS; PARENTERAL at 12:03

## 2020-03-16 RX ADMIN — ATORVASTATIN CALCIUM 80 MG: 20 TABLET, FILM COATED ORAL at 12:03

## 2020-03-16 RX ADMIN — PIPERACILLIN AND TAZOBACTAM 4.5 G: 4; .5 INJECTION, POWDER, LYOPHILIZED, FOR SOLUTION INTRAVENOUS; PARENTERAL at 09:03

## 2020-03-16 RX ADMIN — GABAPENTIN 600 MG: 300 CAPSULE ORAL at 12:03

## 2020-03-16 RX ADMIN — PIPERACILLIN AND TAZOBACTAM 4.5 G: 4; .5 INJECTION, POWDER, LYOPHILIZED, FOR SOLUTION INTRAVENOUS; PARENTERAL at 02:03

## 2020-03-16 NOTE — ASSESSMENT & PLAN NOTE
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  He was seen by podiatry 3/5/2020. Bone and wound cultures +MSSA. He was given course of doxycycline.     MRI studies confirms acute osteomyelitis of the 4th metatarsal head and neck and proximal phalanx.  No rim enhancing fluid collection or drainable abscess.  ALISSA studies wnl. Pt refusing amputation at this time.  Underwent debridement with podiatry at bedside. Deep wound cultures positive for MSSA.        Recommendations    - Continue zosyn as there were concerns for aspiration.  Once that course is over, he may be transitioned back to IV cefazolin to complete a 6 week course.

## 2020-03-16 NOTE — PROGRESS NOTES
[facility]  Infectious Disease  Progress Note    Patient Name: Indio Ryder Jr.  MRN: [id number]  Admission Date: 3/10/2020  Length of Stay: 5 days  Attending Physician: Roberta Reed MD  Primary Care Provider: Lorena Thakur MD    Isolation Status: Airborne and Contact and Droplet  Assessment/Plan:      * Suspected Covid-19 Virus Infection  50 y/o admitted for manage of osteomyelitis of left foot.  Noted to be febrile and hypoxic even after debridement of his foot and appropriate antibiotic.  Will test him for COVID19.  Appropriate isolation procedures.  Continue zosyn for now as aspiration pneumonia is still in the differential.      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  He was seen by podiatry 3/5/2020. Bone and wound cultures +MSSA. He was given course of doxycycline.     MRI studies confirms acute osteomyelitis of the 4th metatarsal head and neck and proximal phalanx.  No rim enhancing fluid collection or drainable abscess.  ALISSA studies wnl. Pt refusing amputation at this time.  Underwent debridement with podiatry at bedside. Deep wound cultures positive for MSSA.        Recommendations    - Continue zosyn as there were concerns for aspiration.  Once that course is over, he may be transitioned back to IV cefazolin to complete a 6 week course.          Anticipated Disposition: TBD    Thank you for your consult. I will follow-up with patient. Please contact us if you have any additional questions.    David Foy MD  Infectious Disease  [facility]    Subjective:     Principal Problem:Suspected Covid-19 Virus Infection    HPI: Mr. Ryder is 50 y/o with a history of poorly controlled DM2 ( last Ha1c  8.4 ) , DKA with coma, diabetic neuropathy, diabatic retinopathy and diabetic foot with multiple amputation who presents to ED with chief complaint of chills associated with nausea and vomiting for one day. He sates that yesterday he started to feel ill and he has been having chills associated with nauseas and vomiting. He also endorses poor appetite with decreased oral intake for the last week. Denies fever, abdominal pain, feet or leg pain, leg swelling, CP, SOB, dysuria. He a history of poorly control diabetes with amputation of his right big and 5th toes. He undergone left big toe detriment and imputation on 7/14/2019. He reports not feeling well for the last couple of weeks and he felt about to pass out on Mardi gras and his BP was in 90/50. He was seen by podiatry last  Thursday when he had his wound opened and he was prescribed doxycycline for 10 days.    Bone cultures +MSSA on 3/5/2020. He is currently on vancomycin and zosyn.     MRI studies confirms acute osteomyelitis of the 4th metatarsal head and neck and proximal phalanx.  No rim enhancing fluid collection or drainable abscess.  ALISSA studies wnl  Blood cultures on admit are positive with GPCs    Interval History:     NO adverse events.  Temperatures are slightly better.    Review of Systems   Constitutional: Positive for fatigue and fever. Negative for activity change, appetite change, chills and diaphoresis.   Respiratory: Positive for cough. Negative for shortness of breath.    Cardiovascular: Positive for leg swelling.   Gastrointestinal: Positive for nausea. Negative for abdominal pain, constipation, diarrhea and vomiting.   Genitourinary: Negative for dysuria.   Musculoskeletal: Negative for back pain.   Skin: Positive for wound. Negative for color change, pallor and rash.   Neurological: Negative for dizziness, light-headedness and headaches.   All other systems reviewed and are negative.    Objective:     Vital Signs (Most Recent):  Temp: 99.8 °F  (37.7 °C) (03/15/20 1643)  Pulse: 102 (03/15/20 1643)  Resp: 18 (03/15/20 1643)  BP: 135/71 (03/15/20 1643)  SpO2: (!) 92 % (03/15/20 1737) Vital Signs (24h Range):  Temp:  [99.1 °F (37.3 °C)-103.2 °F (39.6 °C)] 99.8 °F (37.7 °C)  Pulse:  [101-114] 102  Resp:  [18-22] 18  SpO2:  [85 %-94 %] 92 %  BP: (116-171)/(67-88) 135/71     Weight: 94.8 kg (209 lb)  Body mass index is 27.57 kg/m².    Estimated Creatinine Clearance: 65.8 mL/min (A) (based on SCr of 1.5 mg/dL (H)).    Physical Exam   Constitutional: He appears well-developed and well-nourished. No distress.   HENT:   Head: Normocephalic.   Eyes: Pupils are equal, round, and reactive to light.   Cardiovascular: Normal rate, regular rhythm, normal heart sounds and intact distal pulses.   No murmur heard.  Pulmonary/Chest: Effort normal. No stridor. No respiratory distress. He has no wheezes.   Abdominal: Soft. He exhibits no distension. There is no tenderness.   Musculoskeletal: Normal range of motion. He exhibits edema. He exhibits no tenderness or deformity.   Neurological: He is alert.   Skin: Skin is warm and dry. He is not diaphoretic. There is erythema.   Psychiatric: He has a normal mood and affect.   Vitals reviewed.      Significant Labs:   Microbiology Results (last 7 days)     Procedure Component Value Units Date/Time    Blood culture [[id number]] Collected:  03/10/20 1057    Order Status:  Completed Specimen:  Blood from Peripheral, Antecubital, Left Updated:  03/15/20 1612     Blood Culture, Routine No growth after 5 days.    Blood culture [[id number]] Collected:  03/11/20 1156    Order Status:  Completed Specimen:  Blood Updated:  03/15/20 1412     Blood Culture, Routine No Growth to date      No Growth to date      No Growth to date      No Growth to date      No Growth to date    Aerobic culture [[id number]]  (Abnormal)  (Susceptibility) Collected:  03/12/20 5739    Order Status:  Completed Specimen:  Wound from Foot, Left Updated:  03/15/20 1320      Aerobic Bacterial Culture STAPHYLOCOCCUS AUREUS  Moderate      Culture, Respiratory with Gram Stain [[id number]] Collected:  03/14/20 1339    Order Status:  Completed Specimen:  Respiratory from Sputum, Expectorated Updated:  03/15/20 1030     Respiratory Culture Normal respiratory janes     Gram Stain (Respiratory) <10 epithelial cells per low power field.     Gram Stain (Respiratory) No WBC's     Gram Stain (Respiratory) Many Gram positive cocci     Gram Stain (Respiratory) Few Gram negative rods    Blood culture [[id number]] Collected:  03/14/20 0109    Order Status:  Completed Specimen:  Blood Updated:  03/15/20 0613     Blood Culture, Routine No Growth to date      No Growth to date    Blood culture [[id number]] Collected:  03/14/20 0109    Order Status:  Completed Specimen:  Blood Updated:  03/15/20 0613     Blood Culture, Routine No Growth to date      No Growth to date    Blood culture [[id number]] Collected:  03/12/20 2108    Order Status:  Completed Specimen:  Blood Updated:  03/14/20 2212     Blood Culture, Routine No Growth to date      No Growth to date      No Growth to date    Respiratory Infection Panel (PCR), Nasopharyngeal [[id number]] Collected:  03/14/20 0306    Order Status:  Completed Specimen:  Nasopharyngeal Swab Updated:  03/14/20 1300     Respiratory Infection Panel Source NP Swab     Adenovirus Not Detected     Coronavirus 229E, Common Cold Virus Not Detected     Coronavirus HKU1, Common Cold Virus Not Detected     Coronavirus NL63, Common Cold Virus Not Detected     Coronavirus OC43, Common Cold Virus Not Detected     Comment: The Coronavirus strains detected in this test cause the common cold.  These strains are not the COVID-19 (novel Coronavirus)strain   associated with the respiratory disease outbreak.          Human Metapneumovirus Not Detected     Human Rhinovirus/Enterovirus Not Detected     Influenza A (subtypes H1, H1-2009,H3) Not Detected     Influenza B Not Detected      Parainfluenza Virus 1 Not Detected     Parainfluenza Virus 2 Not Detected     Parainfluenza Virus 3 Not Detected     Parainfluenza Virus 4 Not Detected     Respiratory Syncytial Virus Not Detected     Bordetella Parapertussis (BQ1363) Not Detected     Bordetella pertussis (ptxP) Not Detected     Chlamydia pneumoniae Not Detected     Mycoplasma pneumoniae Not Detected     Comment: Respiratory Infection Panel testing performed by Multiplex PCR.       Narrative:       For all other respiratory sources, order [id number] -  Respiratory Viral Panel by PCR    Influenza A & B by Molecular [[id number]] Collected:  03/14/20 1022    Order Status:  Completed Specimen:  Nasopharyngeal Swab Updated:  03/14/20 1054     Influenza A, Molecular Negative     Influenza B, Molecular Negative     Flu A & B Source Nasal swab    Culture, Respiratory with Gram Stain [[id number]]     Order Status:  Canceled Specimen:  Respiratory     Blood culture [[id number]]  (Abnormal) Collected:  03/10/20 1213    Order Status:  Completed Specimen:  Blood from Peripheral, Wrist, Right Updated:  03/13/20 1455     Blood Culture, Routine Gram stain aer bottle: Gram positive cocci in clusters resembling Staph       Results called to and read back by: Erma Daniel RN 03/11/2020        14:54      Gram stain mary bottle: Gram positive cocci in clusters resembling Staph      COAGULASE-NEGATIVE STAPHYLOCOCCUS SPECIES  Multiple morphotyes - probable contaminates      Culture, Anaerobe [[id number]] Collected:  03/12/20 1726    Order Status:  Sent Specimen:  Wound from Foot, Left Updated:  03/12/20 2123          Significant Imaging: I have reviewed all pertinent imaging results/findings within the past 24 hours.

## 2020-03-16 NOTE — PLAN OF CARE
Problem: Adult Inpatient Plan of Care  Goal: Plan of Care Review  Outcome: Ongoing, Progressing   Airborne, contact and droplet precautions maintained for rule out COVID-19. Patient's temp significantly improved tonight from previous. Patient is currently afebrile. VSS. SpO2 91-93% on 4 L NC. No complaints of pain, nausea, or discomfort. Zosyn administered per order. CBG AC/HS; sliding scale coverage not required. Dressing to foot C/D/I. WCTM.

## 2020-03-16 NOTE — ASSESSMENT & PLAN NOTE
50 y/o admitted for manage of osteomyelitis of left foot.  Noted to be febrile and hypoxic even after debridement of his foot and appropriate antibiotic.  Will test him for COVID19.  Appropriate isolation procedures.  Continue zosyn for now as aspiration pneumonia is still in the differential.

## 2020-03-16 NOTE — PLAN OF CARE
Chart reviewed. Patient had recent bedside debridement and appropriate cultures. No surgical intervention indicated at this time as patient is refusing amputation. Patient currently being tested for Covid-19. Can continue local wound care and antibiotics per culture data. Podiatry will continue to follow along.

## 2020-03-16 NOTE — CONSULTS
Patient with MSSA on wound cultures, currently on Pip/tazo.  Resp cx w/o growth of staph aureus.  Vancomycin d/c.  Will sign off.    Please re-consult if vancomycin therapy warranted moving forward.    Thanks!  Sandeep Garcia, Pharm.D., BCPS  [phone]

## 2020-03-17 LAB
ALBUMIN SERPL BCP-MCNC: 1.8 G/DL (ref 3.5–5.2)
ALP SERPL-CCNC: 100 U/L (ref 55–135)
ALT SERPL W/O P-5'-P-CCNC: <5 U/L (ref 10–44)
ANION GAP SERPL CALC-SCNC: 11 MMOL/L (ref 8–16)
AST SERPL-CCNC: 38 U/L (ref 10–40)
BACTERIA BLD CULT: NORMAL
BACTERIA SPEC ANAEROBE CULT: NORMAL
BILIRUB SERPL-MCNC: 0.4 MG/DL (ref 0.1–1)
BUN SERPL-MCNC: 24 MG/DL (ref 6–20)
CALCIUM SERPL-MCNC: 7.8 MG/DL (ref 8.7–10.5)
CHLORIDE SERPL-SCNC: 105 MMOL/L (ref 95–110)
CO2 SERPL-SCNC: 27 MMOL/L (ref 23–29)
CREAT SERPL-MCNC: 3.9 MG/DL (ref 0.5–1.4)
EST. GFR  (AFRICAN AMERICAN): 19.4 ML/MIN/1.73 M^2
EST. GFR  (NON AFRICAN AMERICAN): 16.7 ML/MIN/1.73 M^2
GLUCOSE SERPL-MCNC: 80 MG/DL (ref 70–110)
PHOSPHATE SERPL-MCNC: 3.3 MG/DL (ref 2.7–4.5)
POCT GLUCOSE: 150 MG/DL (ref 70–110)
POCT GLUCOSE: 167 MG/DL (ref 70–110)
POCT GLUCOSE: 70 MG/DL (ref 70–110)
POCT GLUCOSE: 99 MG/DL (ref 70–110)
POTASSIUM SERPL-SCNC: 3.4 MMOL/L (ref 3.5–5.1)
PROT SERPL-MCNC: 7.3 G/DL (ref 6–8.4)
SODIUM SERPL-SCNC: 143 MMOL/L (ref 136–145)

## 2020-03-17 PROCEDURE — C9399 UNCLASSIFIED DRUGS OR BIOLOG: HCPCS | Performed by: INTERNAL MEDICINE

## 2020-03-17 PROCEDURE — 99232 PR SUBSEQUENT HOSPITAL CARE,LEVL II: ICD-10-PCS | Mod: ,,, | Performed by: INTERNAL MEDICINE

## 2020-03-17 PROCEDURE — 25000003 PHARM REV CODE 250: Performed by: STUDENT IN AN ORGANIZED HEALTH CARE EDUCATION/TRAINING PROGRAM

## 2020-03-17 PROCEDURE — 20600001 HC STEP DOWN PRIVATE ROOM

## 2020-03-17 PROCEDURE — 63600175 PHARM REV CODE 636 W HCPCS: Performed by: STUDENT IN AN ORGANIZED HEALTH CARE EDUCATION/TRAINING PROGRAM

## 2020-03-17 PROCEDURE — 84100 ASSAY OF PHOSPHORUS: CPT

## 2020-03-17 PROCEDURE — 80053 COMPREHEN METABOLIC PANEL: CPT

## 2020-03-17 PROCEDURE — 63600175 PHARM REV CODE 636 W HCPCS: Performed by: INTERNAL MEDICINE

## 2020-03-17 PROCEDURE — 99232 SBSQ HOSP IP/OBS MODERATE 35: CPT | Mod: ,,, | Performed by: INTERNAL MEDICINE

## 2020-03-17 RX ORDER — ENOXAPARIN SODIUM 100 MG/ML
40 INJECTION SUBCUTANEOUS NIGHTLY
Status: DISCONTINUED | OUTPATIENT
Start: 2020-03-17 | End: 2020-03-18

## 2020-03-17 RX ADMIN — INSULIN DETEMIR 4 UNITS: 100 INJECTION, SOLUTION SUBCUTANEOUS at 08:03

## 2020-03-17 RX ADMIN — ATORVASTATIN CALCIUM 80 MG: 20 TABLET, FILM COATED ORAL at 08:03

## 2020-03-17 RX ADMIN — ENOXAPARIN SODIUM 40 MG: 100 INJECTION SUBCUTANEOUS at 08:03

## 2020-03-17 RX ADMIN — GABAPENTIN 600 MG: 300 CAPSULE ORAL at 08:03

## 2020-03-17 RX ADMIN — PIPERACILLIN AND TAZOBACTAM 4.5 G: 4; .5 INJECTION, POWDER, LYOPHILIZED, FOR SOLUTION INTRAVENOUS; PARENTERAL at 08:03

## 2020-03-17 RX ADMIN — PIPERACILLIN AND TAZOBACTAM 4.5 G: 4; .5 INJECTION, POWDER, LYOPHILIZED, FOR SOLUTION INTRAVENOUS; PARENTERAL at 12:03

## 2020-03-17 RX ADMIN — PIPERACILLIN AND TAZOBACTAM 4.5 G: 4; .5 INJECTION, POWDER, LYOPHILIZED, FOR SOLUTION INTRAVENOUS; PARENTERAL at 05:03

## 2020-03-17 NOTE — PROGRESS NOTES
Hospital Medicine  Progress Note  [facility]      Patient Name: Indio Ryder Jr.  MRN:  [id number]  Hospital Medicine Team: [facility] MED V Roberta Reed MD  Date of Admission:  3/10/2020     Length of Stay:  LOS: 6 days       Principal Problem:  Suspected Covid-19 Virus Infection      HPI:  Mr. Ryder is 50 y/o with a history of poorly controlled DM2 ( last Ha1c 8.4 ) , DKA with coma, diabetic neuropathy, diabatic retinopathy and diabetic foot with multiple amputation who presents to ED with chief complaint of chills associated with nausea and vomiting for one day. He sates that yesterday he started to feel ill and he has been having chills associated with nauseas and vomiting. He also endorses poor appetite with decreased oral intake for the last week. Denies fever, abdominal pain, feet or leg pain, leg swelling, CP, SOB, dysuria. He a history of poorly control diabetes with amputation of his right big and 5th toes. He undergone left big toe detriment and imputation on 7/14/2019. He reports not feeling well for the last couple of weeks and he felt about to pass out on Mardi gras and his BP was in 90/50. He was seen by podiatry last  Thursday when he had his wound opened and he was prescribed doxycycline for 10 days.      In ED, he was afebrile, vitally stable. Lab, BS: 382, B-hydroxybutyrate 3.6, HCO3 22,  anion gap 18. WBC: 6.9, lactate: 1.2  X-ray of right food shows soft tissue edema concerning for osteomyelitis. He received single dose of vanc/zosyn and 2L IVF.      Overview/Hospital Course:  Patient with a history of DM2, DKA with coma, osteomyelitis, multiple amputation who present with chills, N/V and found with DKA. Started on insuline gtt and IVF infusion. Pt is currently treated with doxycyline for wound in left foot. ID and podiatry consulted. MRI with acute osteomyelitis of 4th metatarsal head and neck of left proximal phalanx. ID recommended cefazolin and podiatry plan  for for amputation. Pt is refusing amputation but agree to have agressive washout if he keep spiking fever. Pt transitioned to subQ insulin and tolerating PO. Blood ux grwe Staph coagulase negative, TTE done and shows normal valves with no vegetations. Pt had drop in his SpO2 to 85 with need for 5L oxygen. Respiratory cx with many GPC and CXR shows b/l patchy infiltration. Pt still spiking fever and in acute respiratory failure. Concern for COVID-19, test is sent.       Interval History:     Feeling very well today, he is convinced he does not have COVID. Did have fevers previously, none in the last 24hrs. 9% on 4L NC. No cough or dyspnea.     Review of Systems:  Respiratory: no cough, dyspnea  Cardiovascular: no chest pain, palpitations  GI: no diarrhea    Inpatient Medications:    Current Facility-Administered Medications:     acetaminophen tablet 650 mg, 650 mg, Oral, Q8H PRN, Antonio Valladares MD, 650 mg at 03/14/20 2108    albuterol inhaler 4 puff, 4 puff, Inhalation, Q6H PRN, Roberta Reed MD    atorvastatin tablet 80 mg, 80 mg, Oral, Daily, Vicky Singh MD, 80 mg at 03/16/20 1234    dextrose 10% (D10W) Bolus, 12.5 g, Intravenous, PRN, Roberta Reed MD    dextrose 10% (D10W) Bolus, 25 g, Intravenous, PRN, Roberta Reed MD    enoxaparin injection 40 mg, 40 mg, Subcutaneous, Daily, Roberta Reed MD, 40 mg at 03/16/20 1730    gabapentin capsule 600 mg, 600 mg, Oral, Daily, Vciky Singh MD, 600 mg at 03/16/20 1234    glucagon (human recombinant) injection 1 mg, 1 mg, Intramuscular, PRN, Vicky Singh MD    glucose chewable tablet 16 g, 16 g, Oral, PRN, Vicky Singh MD    glucose chewable tablet 24 g, 24 g, Oral, PRN, Vicky Singh MD    hydrALAZINE tablet 25 mg, 25 mg, Oral, Q4H PRN, Doe Bowling MD, 25 mg at 03/13/20 2011    insulin aspart U-100 pen 0-5 Units, 0-5 Units, Subcutaneous, QID (AC + HS) PRN, Vicky Singh MD, 2 Units at  "03/14/20 0740    insulin detemir U-100 pen 8 Units, 8 Units, Subcutaneous, BID, Antonio Valladares MD, 8 Units at 03/16/20 2114    ondansetron disintegrating tablet 8 mg, 8 mg, Oral, Q8H PRN, Vicky Singh MD, 8 mg at 03/11/20 1515    piperacillin-tazobactam 4.5 g in sodium chloride 0.9% 100 mL IVPB (ready to mix system), 4.5 g, Intravenous, Q8H, Lauro Stewart MD, Last Rate: 25 mL/hr at 03/16/20 2109, 4.5 g at 03/16/20 2109    promethazine (PHENERGAN) 6.25 mg in dextrose 5 % 50 mL IVPB, 6.25 mg, Intravenous, Q6H PRN, Vicky Singh MD, Last Rate: 150 mL/hr at 03/12/20 1842, 6.25 mg at 03/12/20 1842    sodium chloride 0.9% flush 10 mL, 10 mL, Intravenous, PRN, Isaac Rudolph MD      Physical Exam:    No intake or output data in the 24 hours ending 03/16/20 2142  Wt Readings from Last 3 Encounters:   03/13/20 94.8 kg (209 lb)   03/05/20 99.3 kg (219 lb)   02/06/20 99.8 kg (220 lb)       BP (!) 159/91   Pulse 95   Temp 97.9 °F (36.6 °C) (Oral)   Resp 18   Ht 6' 1" (1.854 m)   Wt 94.8 kg (209 lb)   SpO2 95%   BMI 27.57 kg/m²     GEN: NAD, conversant and delightful  Resp: normal work of breathing   CV: RRR  GI: soft, NTND  Skin: dry, no rashes    Laboratory:    Recent Labs   Lab 03/13/20  0621 03/14/20  0355 03/15/20  0404   WBC 5.70 7.32 8.18   HGB 10.4* 10.1* 11.5*   HCT 34.4* 33.2* 37.8*    185 233     Recent Labs   Lab 03/12/20  0858  03/13/20  0012 03/13/20  0621 03/13/20  2307 03/14/20  0355 03/14/20  0913 03/15/20  0404      < > 138 139 137  --  134* 133*   K 3.4*   < > 4.0 3.3* 3.2*  --  3.4* 3.8      < > 103 103 99  --  99 94*   CO2 27   < > 27 27 29  --  27 31*   BUN 3*   < > 4* 5* 7  --  8 9   CREATININE 0.9   < > 1.0 0.9 1.0  --  0.9 1.5*   *   < > 368* 231* 184*  --  234* 138*   CALCIUM 7.8*   < > 7.6* 7.7* 7.8*  --  7.7* 8.0*   MG  --   --   --  1.7  --  1.3*  --  2.8*   PHOS 2.1*   < > 2.1* 1.9*  --   --   --  3.1   LIPASE 14  --   --   --   --   -- "   --   --     < > = values in this interval not displayed.     Recent Labs   Lab 03/13/20  0621 03/14/20  0913 03/15/20  0404   ALKPHOS 62 65 84   ALT <5* <5* <5*   AST 17 17 25   ALBUMIN 1.9* 1.8* 1.9*   PROT 6.6 6.8 7.3   BILITOT 0.3 0.3 0.3      Recent Labs   Lab 03/15/20  0813 03/15/20  1156 03/15/20  1632 03/15/20  2208 03/15/20  2259 03/16/20  1711   POCTGLUCOSE 148* 156* 111* 76 114* 82     No results for input(s): CPK, CPKMB, MB, TROPONINI in the last 72 hours.    Recent Labs     03/14/20  1922   LACTATE 1.2        Recent Labs   Lab 03/15/20  0813 03/15/20  1156 03/15/20  1632 03/15/20  2208 03/15/20  2259 03/16/20  1711   POCTGLUCOSE 148* 156* 111* 76 114* 82     Lab Results   Component Value Date    HGBA1C 12.0 (H) 03/12/2020         Microbiology:  Microbiology Results (last 7 days)     Procedure Component Value Units Date/Time    Blood culture [[id number]] Collected:  03/11/20 1156    Order Status:  Completed Specimen:  Blood Updated:  03/16/20 1412     Blood Culture, Routine No growth after 5 days.    Aerobic culture [[id number]]  (Abnormal)  (Susceptibility) Collected:  03/12/20 1726    Order Status:  Completed Specimen:  Wound from Foot, Left Updated:  03/16/20 1054     Aerobic Bacterial Culture STAPHYLOCOCCUS AUREUS  Moderate      Culture, Respiratory with Gram Stain [[id number]] Collected:  03/14/20 1339    Order Status:  Completed Specimen:  Respiratory from Sputum, Expectorated Updated:  03/16/20 1008     Respiratory Culture Normal respiratory janes      No S aureus or Pseudomonas isolated.     Gram Stain (Respiratory) <10 epithelial cells per low power field.     Gram Stain (Respiratory) No WBC's     Gram Stain (Respiratory) Many Gram positive cocci     Gram Stain (Respiratory) Few Gram negative rods    Culture, Anaerobe [[id number]] Collected:  03/12/20 1726    Order Status:  Completed Specimen:  Wound from Foot, Left Updated:  03/16/20 0802     Anaerobic Culture Culture in progress    Blood culture  [[id number]] Collected:  03/14/20 0109    Order Status:  Completed Specimen:  Blood Updated:  03/16/20 0613     Blood Culture, Routine No Growth to date      No Growth to date      No Growth to date    Blood culture [[id number]] Collected:  03/14/20 0109    Order Status:  Completed Specimen:  Blood Updated:  03/16/20 0613     Blood Culture, Routine No Growth to date      No Growth to date      No Growth to date    Blood culture [[id number]] Collected:  03/12/20 2108    Order Status:  Completed Specimen:  Blood Updated:  03/15/20 2212     Blood Culture, Routine No Growth to date      No Growth to date      No Growth to date      No Growth to date    Blood culture [[id number]] Collected:  03/10/20 1057    Order Status:  Completed Specimen:  Blood from Peripheral, Antecubital, Left Updated:  03/15/20 1612     Blood Culture, Routine No growth after 5 days.    Respiratory Infection Panel (PCR), Nasopharyngeal [[id number]] Collected:  03/14/20 0306    Order Status:  Completed Specimen:  Nasopharyngeal Swab Updated:  03/14/20 1300     Respiratory Infection Panel Source NP Swab     Adenovirus Not Detected     Coronavirus 229E, Common Cold Virus Not Detected     Coronavirus HKU1, Common Cold Virus Not Detected     Coronavirus NL63, Common Cold Virus Not Detected     Coronavirus OC43, Common Cold Virus Not Detected     Comment: The Coronavirus strains detected in this test cause the common cold.  These strains are not the COVID-19 (novel Coronavirus)strain   associated with the respiratory disease outbreak.          Human Metapneumovirus Not Detected     Human Rhinovirus/Enterovirus Not Detected     Influenza A (subtypes H1, H1-2009,H3) Not Detected     Influenza B Not Detected     Parainfluenza Virus 1 Not Detected     Parainfluenza Virus 2 Not Detected     Parainfluenza Virus 3 Not Detected     Parainfluenza Virus 4 Not Detected     Respiratory Syncytial Virus Not Detected     Bordetella Parapertussis (WG2565) Not Detected      Bordetella pertussis (ptxP) Not Detected     Chlamydia pneumoniae Not Detected     Mycoplasma pneumoniae Not Detected     Comment: Respiratory Infection Panel testing performed by Multiplex PCR.       Narrative:       For all other respiratory sources, order [id number] -  Respiratory Viral Panel by PCR    Influenza A & B by Molecular [[id number]] Collected:  03/14/20 1022    Order Status:  Completed Specimen:  Nasopharyngeal Swab Updated:  03/14/20 1054     Influenza A, Molecular Negative     Influenza B, Molecular Negative     Flu A & B Source Nasal swab    Culture, Respiratory with Gram Stain [[id number]]     Order Status:  Canceled Specimen:  Respiratory     Blood culture [[id number]]  (Abnormal) Collected:  03/10/20 1213    Order Status:  Completed Specimen:  Blood from Peripheral, Wrist, Right Updated:  03/13/20 1455     Blood Culture, Routine Gram stain aer bottle: Gram positive cocci in clusters resembling Staph       Results called to and read back by: Erma Daniel RN 03/11/2020        14:54      Gram stain mary bottle: Gram positive cocci in clusters resembling Staph      COAGULASE-NEGATIVE STAPHYLOCOCCUS SPECIES  Multiple morphotyes - probable contaminates              Imaging  ECG Results          EKG 12-lead (Final result)  Result time 03/10/20 12:44:04    Final result by Interface, Lab In [facility] (03/10/20 12:44:04)                 Narrative:    Test Reason : E86.0,    Vent. Rate : 103 BPM     Atrial Rate : 107 BPM     P-R Int : 120 ms          QRS Dur : 092 ms      QT Int : 370 ms       P-R-T Axes : 073 078 039 degrees     QTc Int : 485 ms    Sinus tachycardia  Otherwise normal ECG  When compared with ECG of 12-JUL-2019 10:07,  Premature ventricular complexes are no longer Present  Confirmed by BRITTON MARTELL MD ([id number]) on 3/10/2020 12:43:50 PM    Referred By: [email or web address]   SELF           Confirmed By:BRITTON MARTELL MD                              Results for orders placed during the hospital encounter of  03/10/20   Echo Color Flow Doppler? Yes    Narrative · Normal left ventricular systolic function. The estimated ejection   fraction is 55%.  · No wall motion abnormalities.  · Normal LV diastolic function.  · Mild left atrial enlargement.  · Normal right ventricular systolic function.  · Normal central venous pressure (3 mmHg).          X-Ray Chest 1 View  Narrative: EXAMINATION:  XR CHEST 1 VIEW    CLINICAL HISTORY:  increased O2;    TECHNIQUE:  Single frontal view of the chest was performed.    COMPARISON:  07/23/2019    FINDINGS:  Suboptimal inspiration limits characterization.    Heart is normal size.    Mild patchy alveolar infiltrates at the lung bases bilaterally.  Mild right upper lobe infiltrate also..  This could represent mild pneumonia.  Follow-up recommended.  No effusion or pneumothorax.  No acute osseous abnormality.  Impression: Mild bibasilar patchy alveolar infiltrates could represent pneumonia.  Mild right upper lobe infiltrate also.  Recommend follow-up.    This report was flagged in Epic as abnormal.    Electronically signed by: Camilo Rebollar  Date:    03/13/2020  Time:    22:20  Echo Color Flow Doppler? Yes  · Normal left ventricular systolic function. The estimated ejection   fraction is 55%.  · No wall motion abnormalities.  · Normal LV diastolic function.  · Mild left atrial enlargement.  · Normal right ventricular systolic function.  · Normal central venous pressure (3 mmHg).           Assessment and Plan:    Active Hospital Problems    Diagnosis  POA    *Suspected Covid-19 Virus Infection [R68.89]  Yes    Acute hypoxemic respiratory failure [J96.01]  No    Hypomagnesemia [E83.42]  No    Osteomyelitis of left foot [M86.9]  Yes    Bacteremia [R78.81]  Yes    Diabetic ketoacidosis without coma associated with type 2 diabetes mellitus [E11.10]  Yes    Diabetic ulcer of left midfoot associated with type 2 diabetes mellitus, with bone involvement without evidence of necrosis [E11.621,  L97.426]  Yes    Impaired gait and mobility [R26.89]  Yes    Anemia [D64.9]  Yes    Sepsis with acute organ dysfunction [A41.9, R65.20]  No    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 edema, with long-term current use of insulin [E11.3213, E11.65, Z79.4]  Yes     Chronic    Hypokalemia [E87.6]  No    TAHIR (acute kidney injury) [N17.9]  No    Mixed hyperlipidemia [E78.2]  Yes    Essential hypertension [I10]  Yes     Chronic      Resolved Hospital Problems   No resolved problems to display.       Person under investigation for COVID-19  Acute hypoxemic respiratory failure  DDx aspiration event vs multifocal bacterial pneumonia. Hypoxemia persists. Resp failure also may be ARDS related to underlying sepsis from uncontrolled osteomyelitis.   - COVID-19 testing sent.  - Infection Control notified  - Isolation:   - Airborne and Droplet Precautions  - N95 masks must be fit tested, wear eye protection  - 20 second hand hygiene   - Limit visitors per hospital policy  - Diagnostics (leukopenia, hyponatremia, hyperferritinemia, elevated troponin, elevated d-dimer, age, and comorbidities are significant predictors of poor clinical outcome)   - CBC no leukopenia or lymphonenia   - CMP with hyponatremia   - d-dimer, ferritin, troponin, CRP, LDH, Procalcitonin   - ECG   - rapid Flu neg   - RIP neg   - Legionella antigen   - Blood culture x2   - Portable CXR   - UA and culture neg  - Management:   - Supplemental O2 to maintain SpO2 >92%   - Telemetry & Continuous Pulse Ox   - albuterol INHALER PRN (avoid nebulization of secretions)   - No BiPAP to avoid aerosolization (including home BiPAP)   - No steroids unless septic shock due to increased viral replication   - fluid sparing resuscitation   - Empiric antibiotics per likely source & patient allergies    - HCAP/HAP: zosyn    Osteomyelitis of left foot  Podiatry and ID following  - continue IV zosyn  -  will need OPAT    DKA in the setting of poorly controlled T2DM  - SQ insulin  - diabetic diet    CONS positive blood culture  - likely contaminant  - appreciate ID consult      Patient's chronic/stable medical conditions noted in the progress note above will be managed with the patient's home medications as tolerated.       Roberta Rede M.D., M.P.H.  Department of Hospital Medicine  [facility]  (pager) [phone] (spect) [phone]

## 2020-03-17 NOTE — PLAN OF CARE
48 y/o male with a history of poorly controlled DM2 ( last Ha1c 8.4 ), diabetic neuropathy, diabatic retinopathy and diabetic foot ulcer of left foot.  He presented to the ED with fever, chills and worsening of wounds.  Bone and wound cultures have been obtained and are positive for MSSA.  Started on IV cefazolin.  He acutely declined, had nausea and vomiting and short of breath with fevers.  Initial suspicion was for aspiration pneumonia.  A decision was made later to r/o COVID19.  He is now afebrile.    Assessment  1.  MSSA osteomyelitis of left foot  2.  Aspiration pneumonia +/- COVID19.    Plan  1.  Discontinue zosyn after today (5 days total).  2.  Re-start IV cefazolin 6 grams q 24 hours as continuous infusion for a planned 6 week course (stop date of April 22).  3.  Check cbc, cmp, crp once a week and fax to ID clinic at [phone].  4.  Arrange follow up in ID clinic in 4 weeks    ID will sign off.

## 2020-03-17 NOTE — PLAN OF CARE
03/17/20 1157   Discharge Reassessment   Assessment Type Discharge Planning Reassessment   Provided patient/caregiver education on the expected discharge date and the discharge plan Yes   Do you have any problems affording any of your prescribed medications? No   Discharge Plan A Home   Discharge Plan B Home Health   DME Needed Upon Discharge    (TBD)   Anticipated Discharge Disposition Home   Can the patient/caregiver answer the patient profile reliably? Yes, cognitively intact   How does the patient rate their overall health at the present time? Fair   Describe the patient's ability to walk at the present time. Walks with the help of equipment  (Rolling Walker)   How often would a person be available to care for the patient? Whenever needed   Number of comorbid conditions (as recorded on the chart) Three

## 2020-03-17 NOTE — ASSESSMENT & PLAN NOTE
48 y/o admitted for manage of osteomyelitis of left foot.  Noted to be febrile and hypoxic even after debridement of his foot and appropriate antibiotic.  COVID19 testing pending.  Will follow up.

## 2020-03-17 NOTE — ASSESSMENT & PLAN NOTE
50 y/o with a history of poorly controlled DM2 ( last Ha1c 8.4 ), diabetic neuropathy, diabatic retinopathy and diabetic foot ulcer of left foot with multiple amputation who presents to ED with chief complaint of fever, chills and nausea and worsening wounds of his left foot.  He was seen by podiatry 3/5/2020. Bone and wound cultures +MSSA. He was given course of doxycycline.     MRI studies confirms acute osteomyelitis of the 4th metatarsal head and neck and proximal phalanx.  No rim enhancing fluid collection or drainable abscess.  ALISSA studies wnl. Pt refusing amputation at this time.  Underwent debridement with podiatry at bedside. Deep wound cultures positive for MSSA.        Recommendations    - Continue zosyn as there were concerns for aspiration.  Once that course is over, he may be transitioned back to IV cefazolin to complete a 6 week course.

## 2020-03-17 NOTE — SUBJECTIVE & OBJECTIVE
Interval History:     No adverse events.  Says that he feels fine.    Review of Systems   Constitutional: Positive for fatigue and fever. Negative for activity change, appetite change, chills and diaphoresis.   Respiratory: Positive for cough. Negative for shortness of breath.    Cardiovascular: Positive for leg swelling.   Gastrointestinal: Positive for nausea. Negative for abdominal pain, constipation, diarrhea and vomiting.   Genitourinary: Negative for dysuria.   Musculoskeletal: Negative for back pain.   Skin: Positive for wound. Negative for color change, pallor and rash.   Neurological: Negative for dizziness, light-headedness and headaches.   All other systems reviewed and are negative.    Objective:     Vital Signs (Most Recent):  Temp: 97.9 °F (36.6 °C) (03/16/20 2106)  Pulse: 95 (03/16/20 2106)  Resp: 18 (03/16/20 2106)  BP: (!) 159/91 (03/16/20 2106)  SpO2: 95 % (03/16/20 2106) Vital Signs (24h Range):  Temp:  [97.9 °F (36.6 °C)-99.9 °F (37.7 °C)] 97.9 °F (36.6 °C)  Pulse:  [] 95  Resp:  [18] 18  SpO2:  [91 %-95 %] 95 %  BP: (131-164)/(77-93) 159/91     Weight: 94.8 kg (209 lb)  Body mass index is 27.57 kg/m².    Estimated Creatinine Clearance: 65.8 mL/min (A) (based on SCr of 1.5 mg/dL (H)).    Physical Exam   Constitutional: He appears well-developed and well-nourished. No distress.   HENT:   Head: Normocephalic.   Eyes: Pupils are equal, round, and reactive to light.   Cardiovascular: Normal rate, regular rhythm, normal heart sounds and intact distal pulses.   No murmur heard.  Pulmonary/Chest: Effort normal. No stridor. No respiratory distress. He has no wheezes.   Abdominal: Soft. He exhibits no distension. There is no tenderness.   Musculoskeletal: Normal range of motion. He exhibits edema. He exhibits no tenderness or deformity.   Left foot wrapped in dressings.   Neurological: He is alert.   Skin: Skin is warm and dry. He is not diaphoretic. There is erythema.   Psychiatric: He has a normal  mood and affect.   Vitals reviewed.      Significant Labs:   Microbiology Results (last 7 days)     Procedure Component Value Units Date/Time    Blood culture [[id number]] Collected:  03/12/20 2108    Order Status:  Completed Specimen:  Blood Updated:  03/16/20 2212     Blood Culture, Routine No Growth to date      No Growth to date      No Growth to date      No Growth to date      No Growth to date    Blood culture [[id number]] Collected:  03/11/20 1156    Order Status:  Completed Specimen:  Blood Updated:  03/16/20 1412     Blood Culture, Routine No growth after 5 days.    Aerobic culture [[id number]]  (Abnormal)  (Susceptibility) Collected:  03/12/20 1726    Order Status:  Completed Specimen:  Wound from Foot, Left Updated:  03/16/20 1054     Aerobic Bacterial Culture STAPHYLOCOCCUS AUREUS  Moderate      Culture, Respiratory with Gram Stain [[id number]] Collected:  03/14/20 1339    Order Status:  Completed Specimen:  Respiratory from Sputum, Expectorated Updated:  03/16/20 1008     Respiratory Culture Normal respiratory janes      No S aureus or Pseudomonas isolated.     Gram Stain (Respiratory) <10 epithelial cells per low power field.     Gram Stain (Respiratory) No WBC's     Gram Stain (Respiratory) Many Gram positive cocci     Gram Stain (Respiratory) Few Gram negative rods    Culture, Anaerobe [[id number]] Collected:  03/12/20 1726    Order Status:  Completed Specimen:  Wound from Foot, Left Updated:  03/16/20 0802     Anaerobic Culture Culture in progress    Blood culture [[id number]] Collected:  03/14/20 0109    Order Status:  Completed Specimen:  Blood Updated:  03/16/20 0613     Blood Culture, Routine No Growth to date      No Growth to date      No Growth to date    Blood culture [[id number]] Collected:  03/14/20 0109    Order Status:  Completed Specimen:  Blood Updated:  03/16/20 0613     Blood Culture, Routine No Growth to date      No Growth to date      No Growth to date    Blood culture [[id number]]  Collected:  03/10/20 1057    Order Status:  Completed Specimen:  Blood from Peripheral, Antecubital, Left Updated:  03/15/20 1612     Blood Culture, Routine No growth after 5 days.    Respiratory Infection Panel (PCR), Nasopharyngeal [[id number]] Collected:  03/14/20 0306    Order Status:  Completed Specimen:  Nasopharyngeal Swab Updated:  03/14/20 1300     Respiratory Infection Panel Source NP Swab     Adenovirus Not Detected     Coronavirus 229E, Common Cold Virus Not Detected     Coronavirus HKU1, Common Cold Virus Not Detected     Coronavirus NL63, Common Cold Virus Not Detected     Coronavirus OC43, Common Cold Virus Not Detected     Comment: The Coronavirus strains detected in this test cause the common cold.  These strains are not the COVID-19 (novel Coronavirus)strain   associated with the respiratory disease outbreak.          Human Metapneumovirus Not Detected     Human Rhinovirus/Enterovirus Not Detected     Influenza A (subtypes H1, H1-2009,H3) Not Detected     Influenza B Not Detected     Parainfluenza Virus 1 Not Detected     Parainfluenza Virus 2 Not Detected     Parainfluenza Virus 3 Not Detected     Parainfluenza Virus 4 Not Detected     Respiratory Syncytial Virus Not Detected     Bordetella Parapertussis (UX1950) Not Detected     Bordetella pertussis (ptxP) Not Detected     Chlamydia pneumoniae Not Detected     Mycoplasma pneumoniae Not Detected     Comment: Respiratory Infection Panel testing performed by Multiplex PCR.       Narrative:       For all other respiratory sources, order [id number] -  Respiratory Viral Panel by PCR    Influenza A & B by Molecular [[id number]] Collected:  03/14/20 1022    Order Status:  Completed Specimen:  Nasopharyngeal Swab Updated:  03/14/20 1054     Influenza A, Molecular Negative     Influenza B, Molecular Negative     Flu A & B Source Nasal swab    Culture, Respiratory with Gram Stain [[id number]]     Order Status:  Canceled Specimen:  Respiratory     Blood culture  [[id number]]  (Abnormal) Collected:  03/10/20 1213    Order Status:  Completed Specimen:  Blood from Peripheral, Wrist, Right Updated:  03/13/20 1455     Blood Culture, Routine Gram stain aer bottle: Gram positive cocci in clusters resembling Staph       Results called to and read back by: Erma Daniel RN 03/11/2020        14:54      Gram stain mary bottle: Gram positive cocci in clusters resembling Staph      COAGULASE-NEGATIVE STAPHYLOCOCCUS SPECIES  Multiple morphotyes - probable contaminates            Significant Imaging: I have reviewed all pertinent imaging results/findings within the past 24 hours.

## 2020-03-17 NOTE — PROGRESS NOTES
[facility]  Infectious Disease  Progress Note    Patient Name: Indio Ryder Jr.  MRN: [id number]  Admission Date: 3/10/2020  Length of Stay: 6 days  Attending Physician: Roberta Reed MD  Primary Care Provider: Lorena Thakur MD    Isolation Status: Airborne and Contact and Droplet  Assessment/Plan:      * Suspected Covid-19 Virus Infection  48 y/o admitted for manage of osteomyelitis of left foot.  Noted to be febrile and hypoxic even after debridement of his foot and appropriate antibiotic.  COVID19 testing pending.  Will follow up.     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  He was seen by podiatry 3/5/2020. Bone and wound cultures +MSSA. He was given course of doxycycline.     MRI studies confirms acute osteomyelitis of the 4th metatarsal head and neck and proximal phalanx.  No rim enhancing fluid collection or drainable abscess.  ALISSA studies wnl. Pt refusing amputation at this time.  Underwent debridement with podiatry at bedside. Deep wound cultures positive for MSSA.        Recommendations    - Continue zosyn as there were concerns for aspiration.  Once that course is over, he may be transitioned back to IV cefazolin to complete a 6 week course.          Anticipated Disposition: TBD    Thank you for your consult. I will follow-up with patient. Please contact us if you have any additional questions.    David Foy MD  Infectious Disease  [facility]    Subjective:     Principal Problem:Suspected Covid-19 Virus Infection    HPI: Mr. Ryder is 48 y/o with a history of poorly controlled DM2 ( last Ha1c 8.4 ) , DKA with coma, diabetic neuropathy, diabatic retinopathy and diabetic foot with multiple  amputation who presents to ED with chief complaint of chills associated with nausea and vomiting for one day. He sates that yesterday he started to feel ill and he has been having chills associated with nauseas and vomiting. He also endorses poor appetite with decreased oral intake for the last week. Denies fever, abdominal pain, feet or leg pain, leg swelling, CP, SOB, dysuria. He a history of poorly control diabetes with amputation of his right big and 5th toes. He undergone left big toe detriment and imputation on 7/14/2019. He reports not feeling well for the last couple of weeks and he felt about to pass out on Mardi gras and his BP was in 90/50. He was seen by podiatry last  Thursday when he had his wound opened and he was prescribed doxycycline for 10 days.    Bone cultures +MSSA on 3/5/2020. He is currently on vancomycin and zosyn.     MRI studies confirms acute osteomyelitis of the 4th metatarsal head and neck and proximal phalanx.  No rim enhancing fluid collection or drainable abscess.  ALISSA studies wnl      Interval History:     No adverse events.  Says that he feels fine.    Review of Systems   Constitutional: Positive for fatigue and fever. Negative for activity change, appetite change, chills and diaphoresis.   Respiratory: Positive for cough. Negative for shortness of breath.    Cardiovascular: Positive for leg swelling.   Gastrointestinal: Positive for nausea. Negative for abdominal pain, constipation, diarrhea and vomiting.   Genitourinary: Negative for dysuria.   Musculoskeletal: Negative for back pain.   Skin: Positive for wound. Negative for color change, pallor and rash.   Neurological: Negative for dizziness, light-headedness and headaches.   All other systems reviewed and are negative.    Objective:     Vital Signs (Most Recent):  Temp: 97.9 °F (36.6 °C) (03/16/20 2106)  Pulse: 95 (03/16/20 2106)  Resp: 18 (03/16/20 2106)  BP: (!) 159/91 (03/16/20 2106)  SpO2: 95 % (03/16/20 2106) Vital Signs  (24h Range):  Temp:  [97.9 °F (36.6 °C)-99.9 °F (37.7 °C)] 97.9 °F (36.6 °C)  Pulse:  [] 95  Resp:  [18] 18  SpO2:  [91 %-95 %] 95 %  BP: (131-164)/(77-93) 159/91     Weight: 94.8 kg (209 lb)  Body mass index is 27.57 kg/m².    Estimated Creatinine Clearance: 65.8 mL/min (A) (based on SCr of 1.5 mg/dL (H)).    Physical Exam   Constitutional: He appears well-developed and well-nourished. No distress.   HENT:   Head: Normocephalic.   Eyes: Pupils are equal, round, and reactive to light.   Cardiovascular: Normal rate, regular rhythm, normal heart sounds and intact distal pulses.   No murmur heard.  Pulmonary/Chest: Effort normal. No stridor. No respiratory distress. He has no wheezes.   Abdominal: Soft. He exhibits no distension. There is no tenderness.   Musculoskeletal: Normal range of motion. He exhibits edema. He exhibits no tenderness or deformity.   Left foot wrapped in dressings.   Neurological: He is alert.   Skin: Skin is warm and dry. He is not diaphoretic. There is erythema.   Psychiatric: He has a normal mood and affect.   Vitals reviewed.      Significant Labs:   Microbiology Results (last 7 days)     Procedure Component Value Units Date/Time    Blood culture [[id number]] Collected:  03/12/20 2108    Order Status:  Completed Specimen:  Blood Updated:  03/16/20 2212     Blood Culture, Routine No Growth to date      No Growth to date      No Growth to date      No Growth to date      No Growth to date    Blood culture [[id number]] Collected:  03/11/20 1156    Order Status:  Completed Specimen:  Blood Updated:  03/16/20 1412     Blood Culture, Routine No growth after 5 days.    Aerobic culture [[id number]]  (Abnormal)  (Susceptibility) Collected:  03/12/20 1726    Order Status:  Completed Specimen:  Wound from Foot, Left Updated:  03/16/20 1054     Aerobic Bacterial Culture STAPHYLOCOCCUS AUREUS  Moderate      Culture, Respiratory with Gram Stain [[id number]] Collected:  03/14/20 1339    Order Status:   Completed Specimen:  Respiratory from Sputum, Expectorated Updated:  03/16/20 1008     Respiratory Culture Normal respiratory janes      No S aureus or Pseudomonas isolated.     Gram Stain (Respiratory) <10 epithelial cells per low power field.     Gram Stain (Respiratory) No WBC's     Gram Stain (Respiratory) Many Gram positive cocci     Gram Stain (Respiratory) Few Gram negative rods    Culture, Anaerobe [[id number]] Collected:  03/12/20 1726    Order Status:  Completed Specimen:  Wound from Foot, Left Updated:  03/16/20 0802     Anaerobic Culture Culture in progress    Blood culture [[id number]] Collected:  03/14/20 0109    Order Status:  Completed Specimen:  Blood Updated:  03/16/20 0613     Blood Culture, Routine No Growth to date      No Growth to date      No Growth to date    Blood culture [[id number]] Collected:  03/14/20 0109    Order Status:  Completed Specimen:  Blood Updated:  03/16/20 0613     Blood Culture, Routine No Growth to date      No Growth to date      No Growth to date    Blood culture [[id number]] Collected:  03/10/20 1057    Order Status:  Completed Specimen:  Blood from Peripheral, Antecubital, Left Updated:  03/15/20 1612     Blood Culture, Routine No growth after 5 days.    Respiratory Infection Panel (PCR), Nasopharyngeal [[id number]] Collected:  03/14/20 0306    Order Status:  Completed Specimen:  Nasopharyngeal Swab Updated:  03/14/20 1300     Respiratory Infection Panel Source NP Swab     Adenovirus Not Detected     Coronavirus 229E, Common Cold Virus Not Detected     Coronavirus HKU1, Common Cold Virus Not Detected     Coronavirus NL63, Common Cold Virus Not Detected     Coronavirus OC43, Common Cold Virus Not Detected     Comment: The Coronavirus strains detected in this test cause the common cold.  These strains are not the COVID-19 (novel Coronavirus)strain   associated with the respiratory disease outbreak.          Human Metapneumovirus Not Detected     Human Rhinovirus/Enterovirus  Not Detected     Influenza A (subtypes H1, H1-2009,H3) Not Detected     Influenza B Not Detected     Parainfluenza Virus 1 Not Detected     Parainfluenza Virus 2 Not Detected     Parainfluenza Virus 3 Not Detected     Parainfluenza Virus 4 Not Detected     Respiratory Syncytial Virus Not Detected     Bordetella Parapertussis (VK1652) Not Detected     Bordetella pertussis (ptxP) Not Detected     Chlamydia pneumoniae Not Detected     Mycoplasma pneumoniae Not Detected     Comment: Respiratory Infection Panel testing performed by Multiplex PCR.       Narrative:       For all other respiratory sources, order [id number] -  Respiratory Viral Panel by PCR    Influenza A & B by Molecular [[id number]] Collected:  03/14/20 1022    Order Status:  Completed Specimen:  Nasopharyngeal Swab Updated:  03/14/20 1054     Influenza A, Molecular Negative     Influenza B, Molecular Negative     Flu A & B Source Nasal swab    Culture, Respiratory with Gram Stain [[id number]]     Order Status:  Canceled Specimen:  Respiratory     Blood culture [[id number]]  (Abnormal) Collected:  03/10/20 1213    Order Status:  Completed Specimen:  Blood from Peripheral, Wrist, Right Updated:  03/13/20 1455     Blood Culture, Routine Gram stain aer bottle: Gram positive cocci in clusters resembling Staph       Results called to and read back by: Erma Daniel RN 03/11/2020        14:54      Gram stain mary bottle: Gram positive cocci in clusters resembling Staph      COAGULASE-NEGATIVE STAPHYLOCOCCUS SPECIES  Multiple morphotyes - probable contaminates            Significant Imaging: I have reviewed all pertinent imaging results/findings within the past 24 hours.

## 2020-03-17 NOTE — PLAN OF CARE
Problem: Adult Inpatient Plan of Care  Goal: Plan of Care Review  Outcome: Ongoing, Progressing  Flowsheets (Taken 3/17/2020 1804)  Plan of Care Reviewed With: patient   Pt remains free from falls/injury. No acute events during shift. VSS, NAD. Bed in lowest position, wheels locked, side rails up times 2, call light w/in reach. Room clear of obstacles. Pt remains afibrile during shift. Antibiotics administered as ordered. Pt BG monitored AC/HS as per order. Pt covered with insulin aspart per low sliding scale. No S&S of hypo/hyperglycemia noticed. Wound care performed per order. WCTM.

## 2020-03-18 LAB
BILIRUB UR QL STRIP: NEGATIVE
CLARITY UR REFRACT.AUTO: CLEAR
COLOR UR AUTO: ABNORMAL
CREAT UR-MCNC: 32 MG/DL (ref 23–375)
GLUCOSE UR QL STRIP: NEGATIVE
HGB UR QL STRIP: ABNORMAL
KETONES UR QL STRIP: NEGATIVE
LEUKOCYTE ESTERASE UR QL STRIP: NEGATIVE
NITRITE UR QL STRIP: NEGATIVE
PH UR STRIP: 6 [PH] (ref 5–8)
POCT GLUCOSE: 138 MG/DL (ref 70–110)
POCT GLUCOSE: 158 MG/DL (ref 70–110)
POCT GLUCOSE: 159 MG/DL (ref 70–110)
POCT GLUCOSE: 205 MG/DL (ref 70–110)
POTASSIUM UR-SCNC: <10 MMOL/L (ref 15–95)
PROT UR QL STRIP: NEGATIVE
SODIUM UR-SCNC: 29 MMOL/L (ref 20–250)
SP GR UR STRIP: 1 (ref 1–1.03)
URN SPEC COLLECT METH UR: ABNORMAL
UUN UR-MCNC: 114 MG/DL (ref 140–1050)
VANCOMYCIN SERPL-MCNC: 14.8 UG/ML

## 2020-03-18 PROCEDURE — 84133 ASSAY OF URINE POTASSIUM: CPT

## 2020-03-18 PROCEDURE — 84540 ASSAY OF URINE/UREA-N: CPT

## 2020-03-18 PROCEDURE — C9399 UNCLASSIFIED DRUGS OR BIOLOG: HCPCS | Performed by: INTERNAL MEDICINE

## 2020-03-18 PROCEDURE — 63600175 PHARM REV CODE 636 W HCPCS: Performed by: INTERNAL MEDICINE

## 2020-03-18 PROCEDURE — 20600001 HC STEP DOWN PRIVATE ROOM

## 2020-03-18 PROCEDURE — 84300 ASSAY OF URINE SODIUM: CPT

## 2020-03-18 PROCEDURE — 63600175 PHARM REV CODE 636 W HCPCS: Performed by: STUDENT IN AN ORGANIZED HEALTH CARE EDUCATION/TRAINING PROGRAM

## 2020-03-18 PROCEDURE — 99232 PR SUBSEQUENT HOSPITAL CARE,LEVL II: ICD-10-PCS | Mod: ,,, | Performed by: INTERNAL MEDICINE

## 2020-03-18 PROCEDURE — 80202 ASSAY OF VANCOMYCIN: CPT

## 2020-03-18 PROCEDURE — 25000003 PHARM REV CODE 250: Performed by: STUDENT IN AN ORGANIZED HEALTH CARE EDUCATION/TRAINING PROGRAM

## 2020-03-18 PROCEDURE — 81003 URINALYSIS AUTO W/O SCOPE: CPT

## 2020-03-18 PROCEDURE — 25000003 PHARM REV CODE 250: Performed by: INTERNAL MEDICINE

## 2020-03-18 PROCEDURE — 82570 ASSAY OF URINE CREATININE: CPT

## 2020-03-18 PROCEDURE — 36415 COLL VENOUS BLD VENIPUNCTURE: CPT

## 2020-03-18 PROCEDURE — 99232 SBSQ HOSP IP/OBS MODERATE 35: CPT | Mod: ,,, | Performed by: INTERNAL MEDICINE

## 2020-03-18 RX ORDER — CEFAZOLIN SODIUM 1 G/3ML
2 INJECTION, POWDER, FOR SOLUTION INTRAMUSCULAR; INTRAVENOUS
Status: DISCONTINUED | OUTPATIENT
Start: 2020-03-18 | End: 2020-03-22

## 2020-03-18 RX ORDER — ENOXAPARIN SODIUM 100 MG/ML
30 INJECTION SUBCUTANEOUS NIGHTLY
Status: DISCONTINUED | OUTPATIENT
Start: 2020-03-18 | End: 2020-03-20

## 2020-03-18 RX ORDER — GABAPENTIN 300 MG/1
600 CAPSULE ORAL NIGHTLY
Status: DISCONTINUED | OUTPATIENT
Start: 2020-03-18 | End: 2020-03-21

## 2020-03-18 RX ORDER — ENOXAPARIN SODIUM 100 MG/ML
30 INJECTION SUBCUTANEOUS EVERY 24 HOURS
Status: DISCONTINUED | OUTPATIENT
Start: 2020-03-18 | End: 2020-03-18

## 2020-03-18 RX ADMIN — INSULIN DETEMIR 4 UNITS: 100 INJECTION, SOLUTION SUBCUTANEOUS at 09:03

## 2020-03-18 RX ADMIN — INSULIN ASPART 2 UNITS: 100 INJECTION, SOLUTION INTRAVENOUS; SUBCUTANEOUS at 11:03

## 2020-03-18 RX ADMIN — INSULIN DETEMIR 4 UNITS: 100 INJECTION, SOLUTION SUBCUTANEOUS at 08:03

## 2020-03-18 RX ADMIN — CEFAZOLIN 2 G: 1 INJECTION, POWDER, FOR SOLUTION INTRAMUSCULAR; INTRAVENOUS at 09:03

## 2020-03-18 RX ADMIN — ENOXAPARIN SODIUM 30 MG: 30 INJECTION SUBCUTANEOUS at 09:03

## 2020-03-18 RX ADMIN — GABAPENTIN 600 MG: 300 CAPSULE ORAL at 08:03

## 2020-03-18 RX ADMIN — GABAPENTIN 600 MG: 300 CAPSULE ORAL at 09:03

## 2020-03-18 RX ADMIN — ATORVASTATIN CALCIUM 80 MG: 20 TABLET, FILM COATED ORAL at 08:03

## 2020-03-18 RX ADMIN — PIPERACILLIN AND TAZOBACTAM 4.5 G: 4; .5 INJECTION, POWDER, LYOPHILIZED, FOR SOLUTION INTRAVENOUS; PARENTERAL at 04:03

## 2020-03-18 NOTE — PROGRESS NOTES
Pharmacist Renal Dose Adjustment Note    Indio Ryder Jr. is a 49 y.o. male being treated with enoxaparin    Patient Data:    Vital Signs (Most Recent):  Temp: 97.7 °F (36.5 °C) (03/18/20 0755)  Pulse: 99 (03/18/20 0755)  Resp: 18 (03/18/20 0755)  BP: (!) 171/103 (03/18/20 0755)  SpO2: 97 % (03/18/20 0755)   Vital Signs (72h Range):  Temp:  [97.5 °F (36.4 °C)-99.9 °F (37.7 °C)]   Pulse:  []   Resp:  [16-20]   BP: (131-171)/()   SpO2:  [91 %-97 %]      Recent Labs   Lab 03/14/20  0913 03/15/20  0404 03/17/20  1110   CREATININE 0.9 1.5* 3.9*     Serum creatinine: 3.9 mg/dL (H) 03/17/20 1110  Estimated creatinine clearance: 25.3 mL/min (A)    Enoxaparin will be changed to 30 mg daily due to TAHIR    Pharmacist's Name: ANGELINE ARCINIEGA  Pharmacist's Extension: [phone]

## 2020-03-18 NOTE — PLAN OF CARE
No acute changes overnight. Pt resting. No needs at this time. Airborne, droplet, and contact isolation maintained. O2 at 3 L nasal cannula. Call bell in reach. Will continue to monitor.

## 2020-03-18 NOTE — PROGRESS NOTES
Hospital Medicine  Progress Note  [facility]      Patient Name: Indio Ryder Jr.  MRN:  [id number]  Hospital Medicine Team: [facility] V Roberta Reed MD  Date of Admission:  3/10/2020     Length of Stay:  LOS: 8 days       Principal Problem:  Suspected Covid-19 Virus Infection      HPI:  Mr. Ryder is 50 y/o with a history of poorly controlled DM2 ( last Ha1c 8.4 ) , DKA with coma, diabetic neuropathy, diabatic retinopathy and diabetic foot with multiple amputation who presents to ED with chief complaint of chills associated with nausea and vomiting for one day. He sates that yesterday he started to feel ill and he has been having chills associated with nauseas and vomiting. He also endorses poor appetite with decreased oral intake for the last week. Denies fever, abdominal pain, feet or leg pain, leg swelling, CP, SOB, dysuria. He a history of poorly control diabetes with amputation of his right big and 5th toes. He undergone left big toe detriment and imputation on 7/14/2019. He reports not feeling well for the last couple of weeks and he felt about to pass out on [place]ardi gras and his BP was in 90/50. He was seen by podiatry last  Thursday when he had his wound opened and he was prescribed doxycycline for 10 days.      In ED, he was afebrile, vitally stable. Lab, BS: 382, B-hydroxybutyrate 3.6, HCO3 22,  anion gap 18. WBC: 6.9, lactate: 1.2  X-ray of right food shows soft tissue edema concerning for osteomyelitis. He received single dose of vanc/zosyn and 2L IVF.      Overview/Hospital Course:  Patient with a history of DM2, DKA with coma, osteomyelitis, multiple amputation who present with chills, N/V and found with DKA. Started on insuline gtt and IVF infusion. Pt is currently treated with doxycyline for wound in left foot. ID and podiatry consulted. MRI with acute osteomyelitis of 4th metatarsal head and neck of left proximal phalanx. ID recommended cefazolin and podiatry plan  for for amputation. Pt is refusing amputation but agree to have agressive washout if he keep spiking fever. Pt transitioned to subQ insulin and tolerating PO. Blood ux grwe Staph coagulase negative, TTE done and shows normal valves with no vegetations. Pt had drop in his SpO2 to 85 with need for 5L oxygen. Respiratory cx with many GPC and CXR shows b/l patchy infiltration. Pt still spiking fever and in acute respiratory failure. Concern for COVID-19, test is sent.     Continues to feel well, afebrile. Finished with zosyn. ID recs start cefazolin back tomorrow. 95% on 4L NC. Will continue to wean as tolerated.       Interval History:     TAHIR on labs with cre 3.6, good urine output, voluminous and clear. No symptoms. No hematuria. Pain controlled. No cough. SpO2 92% on 2L NC.    Review of Systems:  Respiratory: no cough, dyspnea  Cardiovascular: no chest pain, palpitations  GI: no diarrhea    Inpatient Medications:    Current Facility-Administered Medications:     acetaminophen tablet 650 mg, 650 mg, Oral, Q8H PRN, Antonio Valladares MD, 650 mg at 03/14/20 2108    albuterol inhaler 4 puff, 4 puff, Inhalation, Q6H PRN, Roberta Reed MD    atorvastatin tablet 80 mg, 80 mg, Oral, Daily, Vicky Singh MD, 80 mg at 03/18/20 0800    dextrose 10% (D10W) Bolus, 12.5 g, Intravenous, PRN, Roberta Reed MD    dextrose 10% (D10W) Bolus, 25 g, Intravenous, PRN, Roberta Reed MD    enoxaparin injection 30 mg, 30 mg, Subcutaneous, Daily, Roberta Reed MD    gabapentin capsule 600 mg, 600 mg, Oral, QHS, Roberta Reed MD    glucagon (human recombinant) injection 1 mg, 1 mg, Intramuscular, PRN, Vicky Singh MD    glucose chewable tablet 16 g, 16 g, Oral, PRN, Vicky Singh MD    glucose chewable tablet 24 g, 24 g, Oral, PRN, Vicky Singh MD    hydrALAZINE tablet 25 mg, 25 mg, Oral, Q4H PRN, Doe Bowling MD, 25 mg at 03/13/20 2011    insulin aspart U-100 pen 0-5 Units,  "0-5 Units, Subcutaneous, QID (AC + HS) PRN, Vicky Singh MD, 2 Units at 03/18/20 1129    insulin detemir U-100 pen 4 Units, 4 Units, Subcutaneous, BID, Roberta Reed MD, 4 Units at 03/18/20 0800    ondansetron disintegrating tablet 8 mg, 8 mg, Oral, Q8H PRN, Vicky Singh MD, 8 mg at 03/11/20 1515    promethazine (PHENERGAN) 6.25 mg in dextrose 5 % 50 mL IVPB, 6.25 mg, Intravenous, Q6H PRN, Vicky Singh MD, Last Rate: 150 mL/hr at 03/12/20 1842, 6.25 mg at 03/12/20 1842    sodium chloride 0.9% flush 10 mL, 10 mL, Intravenous, PRN, Isaac Rudolph MD      Physical Exam:      Intake/Output Summary (Last 24 hours) at 3/18/2020 1402  Last data filed at 3/18/2020 1100  Gross per 24 hour   Intake --   Output 950 ml   Net -950 ml     Wt Readings from Last 3 Encounters:   03/13/20 94.8 kg (209 lb)   03/05/20 99.3 kg (219 lb)   02/06/20 99.8 kg (220 lb)       BP (!) 163/96 (BP Location: Right arm, Patient Position: Lying) Comment: 174/98 initial; retaken   Pulse 93   Temp 97.8 °F (36.6 °C) (Oral)   Resp 20   Ht 6' 1" (1.854 m)   Wt 94.8 kg (209 lb)   SpO2 (!) 92%   BMI 27.57 kg/m²     GEN: NAD, conversant and interactive  Resp: normal work of breathing on NC  CV: RRR   GI: soft, NTND  Skin: dry, no rashes    Laboratory:    Recent Labs   Lab 03/13/20  0621 03/14/20  0355 03/15/20  0404   WBC 5.70 7.32 8.18   HGB 10.4* 10.1* 11.5*   HCT 34.4* 33.2* 37.8*    185 233     Recent Labs   Lab 03/12/20  0858  03/13/20  0621  03/14/20  0355 03/14/20  0913 03/15/20  0404 03/17/20  1110      < > 139   < >  --  134* 133* 143   K 3.4*   < > 3.3*   < >  --  3.4* 3.8 3.4*      < > 103   < >  --  99 94* 105   CO2 27   < > 27   < >  --  27 31* 27   BUN 3*   < > 5*   < >  --  8 9 24*   CREATININE 0.9   < > 0.9   < >  --  0.9 1.5* 3.9*   *   < > 231*   < >  --  234* 138* 80   CALCIUM 7.8*   < > 7.7*   < >  --  7.7* 8.0* 7.8*   MG  --   --  1.7  --  1.3*  --  2.8*  --    PHOS 2.1*   < > " 1.9*  --   --   --  3.1 3.3   LIPASE 14  --   --   --   --   --   --   --     < > = values in this interval not displayed.     Recent Labs   Lab 03/14/20  0913 03/15/20  0404 03/17/20  1110   ALKPHOS 65 84 100   ALT <5* <5* <5*   AST 17 25 38   ALBUMIN 1.8* 1.9* 1.8*   PROT 6.8 7.3 7.3   BILITOT 0.3 0.3 0.4      Recent Labs   Lab 03/17/20  0806 03/17/20  1233 03/17/20  1714 03/17/20 2017 03/18/20  0800 03/18/20  1126   POCTGLUCOSE 70 99 150* 167* 159* 205*     No results for input(s): CPK, CPKMB, MB, TROPONINI in the last 72 hours.    No results for input(s): LACTATE in the last 72 hours.     Recent Labs   Lab 03/17/20  0806 03/17/20  1233 03/17/20  1714 03/17/20 2017 03/18/20  0800 03/18/20  1126   POCTGLUCOSE 70 99 150* 167* 159* 205*     Lab Results   Component Value Date    HGBA1C 12.0 (H) 03/12/2020         Microbiology:  Microbiology Results (last 7 days)     Procedure Component Value Units Date/Time    Blood culture [[id number]] Collected:  03/14/20 0109    Order Status:  Completed Specimen:  Blood Updated:  03/18/20 0613     Blood Culture, Routine No Growth to date      No Growth to date      No Growth to date      No Growth to date      No Growth to date    Blood culture [[id number]] Collected:  03/14/20 0109    Order Status:  Completed Specimen:  Blood Updated:  03/18/20 0612     Blood Culture, Routine No Growth to date      No Growth to date      No Growth to date      No Growth to date      No Growth to date    Blood culture [[id number]] Collected:  03/12/20 2108    Order Status:  Completed Specimen:  Blood Updated:  03/17/20 2212     Blood Culture, Routine No growth after 5 days.    Culture, Anaerobe [[id number]] Collected:  03/12/20 1726    Order Status:  Completed Specimen:  Wound from Foot, Left Updated:  03/17/20 1303     Anaerobic Culture No anaerobes isolated    Blood culture [[id number]] Collected:  03/11/20 1156    Order Status:  Completed Specimen:  Blood Updated:  03/16/20 1412     Blood Culture,  Routine No growth after 5 days.    Aerobic culture [[id number]]  (Abnormal)  (Susceptibility) Collected:  03/12/20 1726    Order Status:  Completed Specimen:  Wound from Foot, Left Updated:  03/16/20 1054     Aerobic Bacterial Culture STAPHYLOCOCCUS AUREUS  Moderate      Culture, Respiratory with Gram Stain [[id number]] Collected:  03/14/20 1339    Order Status:  Completed Specimen:  Respiratory from Sputum, Expectorated Updated:  03/16/20 1008     Respiratory Culture Normal respiratory janes      No S aureus or Pseudomonas isolated.     Gram Stain (Respiratory) <10 epithelial cells per low power field.     Gram Stain (Respiratory) No WBC's     Gram Stain (Respiratory) Many Gram positive cocci     Gram Stain (Respiratory) Few Gram negative rods    Blood culture [[id number]] Collected:  03/10/20 1057    Order Status:  Completed Specimen:  Blood from Peripheral, Antecubital, Left Updated:  03/15/20 1612     Blood Culture, Routine No growth after 5 days.    Respiratory Infection Panel (PCR), Nasopharyngeal [[id number]] Collected:  03/14/20 0306    Order Status:  Completed Specimen:  Nasopharyngeal Swab Updated:  03/14/20 1300     Respiratory Infection Panel Source NP Swab     Adenovirus Not Detected     Coronavirus 229E, Common Cold Virus Not Detected     Coronavirus HKU1, Common Cold Virus Not Detected     Coronavirus NL63, Common Cold Virus Not Detected     Coronavirus OC43, Common Cold Virus Not Detected     Comment: The Coronavirus strains detected in this test cause the common cold.  These strains are not the COVID-19 (novel Coronavirus)strain   associated with the respiratory disease outbreak.          Human Metapneumovirus Not Detected     Human Rhinovirus/Enterovirus Not Detected     Influenza A (subtypes H1, H1-2009,H3) Not Detected     Influenza B Not Detected     Parainfluenza Virus 1 Not Detected     Parainfluenza Virus 2 Not Detected     Parainfluenza Virus 3 Not Detected     Parainfluenza Virus 4 Not Detected      Respiratory Syncytial Virus Not Detected     Bordetella Parapertussis (QC3316) Not Detected     Bordetella pertussis (ptxP) Not Detected     Chlamydia pneumoniae Not Detected     Mycoplasma pneumoniae Not Detected     Comment: Respiratory Infection Panel testing performed by Multiplex PCR.       Narrative:       For all other respiratory sources, order [id number] -  Respiratory Viral Panel by PCR    Influenza A & B by Molecular [[id number]] Collected:  03/14/20 1022    Order Status:  Completed Specimen:  Nasopharyngeal Swab Updated:  03/14/20 1054     Influenza A, Molecular Negative     Influenza B, Molecular Negative     Flu A & B Source Nasal swab    Culture, Respiratory with Gram Stain [[id number]]     Order Status:  Canceled Specimen:  Respiratory     Blood culture [[id number]]  (Abnormal) Collected:  03/10/20 1213    Order Status:  Completed Specimen:  Blood from Peripheral, Wrist, Right Updated:  03/13/20 1455     Blood Culture, Routine Gram stain aer bottle: Gram positive cocci in clusters resembling Staph       Results called to and read back by: Erma Daniel RN 03/11/2020        14:54      Gram stain mary bottle: Gram positive cocci in clusters resembling Staph      COAGULASE-NEGATIVE STAPHYLOCOCCUS SPECIES  Multiple morphotyes - probable contaminates              Imaging  ECG Results          EKG 12-lead (Final result)  Result time 03/10/20 12:44:04    Final result by Interface, Lab In [facility] (03/10/20 12:44:04)                 Narrative:    Test Reason : E86.0,    Vent. Rate : 103 BPM     Atrial Rate : 107 BPM     P-R Int : 120 ms          QRS Dur : 092 ms      QT Int : 370 ms       P-R-T Axes : 073 078 039 degrees     QTc Int : 485 ms    Sinus tachycardia  Otherwise normal ECG  When compared with ECG of 12-JUL-2019 10:07,  Premature ventricular complexes are no longer Present  Confirmed by BRITTON MARTELL MD ([id number]) on 3/10/2020 12:43:50 PM    Referred By: [email or web address]   SELF           Confirmed By:BRITTON  JAYSHREE MCKINNON                              Results for orders placed during the hospital encounter of 03/10/20   Echo Color Flow Doppler? Yes    Narrative · Normal left ventricular systolic function. The estimated ejection   fraction is 55%.  · No wall motion abnormalities.  · Normal LV diastolic function.  · Mild left atrial enlargement.  · Normal right ventricular systolic function.  · Normal central venous pressure (3 mmHg).          X-Ray Chest 1 View  Narrative: EXAMINATION:  XR CHEST 1 VIEW    CLINICAL HISTORY:  increased O2;    TECHNIQUE:  Single frontal view of the chest was performed.    COMPARISON:  07/23/2019    FINDINGS:  Suboptimal inspiration limits characterization.    Heart is normal size.    Mild patchy alveolar infiltrates at the lung bases bilaterally.  Mild right upper lobe infiltrate also..  This could represent mild pneumonia.  Follow-up recommended.  No effusion or pneumothorax.  No acute osseous abnormality.  Impression: Mild bibasilar patchy alveolar infiltrates could represent pneumonia.  Mild right upper lobe infiltrate also.  Recommend follow-up.    This report was flagged in Epic as abnormal.    Electronically signed by: Camilo Rebollar  Date:    03/13/2020  Time:    22:20  Echo Color Flow Doppler? Yes  · Normal left ventricular systolic function. The estimated ejection   fraction is 55%.  · No wall motion abnormalities.  · Normal LV diastolic function.  · Mild left atrial enlargement.  · Normal right ventricular systolic function.  · Normal central venous pressure (3 mmHg).           Assessment and Plan:    Active Hospital Problems    Diagnosis  POA    *Suspected Covid-19 Virus Infection [R68.89]  Yes    Acute hypoxemic respiratory failure [J96.01]  No    Hypomagnesemia [E83.42]  No    Osteomyelitis of left foot [M86.9]  Yes    Bacteremia [R78.81]  Yes    Diabetic ketoacidosis without coma associated with type 2 diabetes mellitus [E11.10]  Yes    Diabetic ulcer of left midfoot  associated with type 2 diabetes mellitus, with bone involvement without evidence of necrosis [E11.621, L97.426]  Yes    Impaired gait and mobility [R26.89]  Yes    Anemia [D64.9]  Yes    Sepsis with acute organ dysfunction [A41.9, R65.20]  No    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 edema, with long-term current use of insulin [E11.3213, E11.65, Z79.4]  Yes     Chronic    Hypokalemia [E87.6]  No    TAHIR (acute kidney injury) [N17.9]  No    Mixed hyperlipidemia [E78.2]  Yes    Essential hypertension [I10]  Yes     Chronic      Resolved Hospital Problems   No resolved problems to display.       Person under investigation for COVID-19  Acute hypoxemic respiratory failure  DDx aspiration event vs multifocal bacterial pneumonia. Hypoxemia persists. Resp failure also may be ARDS related to underlying sepsis from uncontrolled osteomyelitis.   - COVID-19 testing sent.  - Infection Control notified  - Isolation:   - Airborne and Droplet Precautions  - N95 masks must be fit tested, wear eye protection  - 20 second hand hygiene   - Limit visitors per hospital policy  - Diagnostics (leukopenia, hyponatremia, hyperferritinemia, elevated troponin, elevated d-dimer, age, and comorbidities are significant predictors of poor clinical outcome)   - CBC no leukopenia or lymphonenia   - CMP with hyponatremia   - d-dimer, ferritin, troponin, CRP, LDH, Procalcitonin   - ECG nonishcemic   - rapid Flu neg   - RIP neg   - Legionella antigen   - Blood culture x2   - Portable CXR   - UA and culture neg  - Management:   - Supplemental O2 to maintain SpO2 >92%   - Telemetry & Continuous Pulse Ox   - albuterol INHALER PRN (avoid nebulization of secretions)   - No BiPAP to avoid aerosolization (including home BiPAP)   - No steroids unless septic shock due to increased viral replication   - fluid sparing resuscitation   - Empiric antibiotics per likely source & patient  allergies    - HCAP/HAP: zosyn, final day is 3/17    Osteomyelitis of left foot  Podiatry and ID following  - s/p iv zosyn  - will need to restart cefazolin per ID, pharm D for dosing  - will need OPAT cefazolin    TAHIR  Suspect prerenal given recent illness. DDx AIN from zosyn vs. Vanc toxicity  - urinalysis  - urine electrolytes  - making good urine  - BMP in the AM    DKA in the setting of poorly controlled T2DM  - SQ insulin  - diabetic diet    CONS positive blood culture  - likely contaminant  - appreciate ID consult      Patient's chronic/stable medical conditions noted in the progress note above will be managed with the patient's home medications as tolerated.       Roberta Reed M.D., M.P.H.  Department of Hospital Medicine  [facility]  (pager) [phone] (spect) [phone]

## 2020-03-18 NOTE — PROGRESS NOTES
Pharmacist Renal Dose Adjustment Note    Indio Ryder Jr. is a 49 y.o. male being treated with Pip/tazo    Patient Data:    Vital Signs (Most Recent):  Temp: 97.7 °F (36.5 °C) (03/18/20 0755)  Pulse: 99 (03/18/20 0755)  Resp: 18 (03/18/20 0755)  BP: (!) 171/103 (03/18/20 0755)  SpO2: 97 % (03/18/20 0755)   Vital Signs (72h Range):  Temp:  [97.5 °F (36.4 °C)-99.9 °F (37.7 °C)]   Pulse:  []   Resp:  [16-20]   BP: (131-171)/()   SpO2:  [91 %-97 %]      Recent Labs   Lab 03/14/20  0913 03/15/20  0404 03/17/20  1110   CREATININE 0.9 1.5* 3.9*     Serum creatinine: 3.9 mg/dL (H) 03/17/20 1110  Estimated creatinine clearance: 25.3 mL/min (A)    Pip/tazo will be changed to 4.5 g q12h for TAHIR.     Pharmacist's Name: ANGELINE ARCINIEGA  Pharmacist's Extension: [phone]

## 2020-03-18 NOTE — PROGRESS NOTES
Hospital Medicine  Progress Note  [facility]      Patient Name: Indio Ryder Jr.  MRN:  [id number]  Hospital Medicine Team: [facility] V Roberta Reed MD  Date of Admission:  3/10/2020     Length of Stay:  LOS: 7 days       Principal Problem:  Suspected Covid-19 Virus Infection      HPI:  Mr. Ryder is 50 y/o with a history of poorly controlled DM2 ( last Ha1c 8.4 ) , DKA with coma, diabetic neuropathy, diabatic retinopathy and diabetic foot with multiple amputation who presents to ED with chief complaint of chills associated with nausea and vomiting for one day. He sates that yesterday he started to feel ill and he has been having chills associated with nauseas and vomiting. He also endorses poor appetite with decreased oral intake for the last week. Denies fever, abdominal pain, feet or leg pain, leg swelling, CP, SOB, dysuria. He a history of poorly control diabetes with amputation of his right big and 5th toes. He undergone left big toe detriment and imputation on 7/14/2019. He reports not feeling well for the last couple of weeks and he felt about to pass out on Mardi gras and his BP was in 90/50. He was seen by podiatry last  Thursday when he had his wound opened and he was prescribed doxycycline for 10 days.      In ED, he was afebrile, vitally stable. Lab, BS: 382, B-hydroxybutyrate 3.6, HCO3 22,  anion gap 18. WBC: 6.9, lactate: 1.2  X-ray of right food shows soft tissue edema concerning for osteomyelitis. He received single dose of vanc/zosyn and 2L IVF.      Overview/Hospital Course:  Patient with a history of DM2, DKA with coma, osteomyelitis, multiple amputation who present with chills, N/V and found with DKA. Started on insuline gtt and IVF infusion. Pt is currently treated with doxycyline for wound in left foot. ID and podiatry consulted. MRI with acute osteomyelitis of 4th metatarsal head and neck of left proximal phalanx. ID recommended cefazolin and podiatry plan  for for amputation. Pt is refusing amputation but agree to have agressive washout if he keep spiking fever. Pt transitioned to subQ insulin and tolerating PO. Blood ux grwe Staph coagulase negative, TTE done and shows normal valves with no vegetations. Pt had drop in his SpO2 to 85 with need for 5L oxygen. Respiratory cx with many GPC and CXR shows b/l patchy infiltration. Pt still spiking fever and in acute respiratory failure. Concern for COVID-19, test is sent.       Interval History:     Continues to feel well, afebrile. Finished with zosyn today. ID recs start cefazolin back tomorrow. 95% on 4L NC. Will continue to wean as tolerated.     Review of Systems:  Respiratory: no cough, dyspnea  Cardiovascular: no chest pain, palpitations  GI: no diarrhea    Inpatient Medications:    Current Facility-Administered Medications:     acetaminophen tablet 650 mg, 650 mg, Oral, Q8H PRN, Antonio Valladares MD, 650 mg at 03/14/20 2108    albuterol inhaler 4 puff, 4 puff, Inhalation, Q6H PRN, Roberta Reed MD    atorvastatin tablet 80 mg, 80 mg, Oral, Daily, Vicky Singh MD, 80 mg at 03/17/20 0802    dextrose 10% (D10W) Bolus, 12.5 g, Intravenous, PRN, Roberta Reed MD    dextrose 10% (D10W) Bolus, 25 g, Intravenous, PRN, Roberta Reed MD    enoxaparin injection 40 mg, 40 mg, Subcutaneous, QHS, Roberta Reed MD    gabapentin capsule 600 mg, 600 mg, Oral, Daily, Vicky Singh MD, 600 mg at 03/17/20 0802    glucagon (human recombinant) injection 1 mg, 1 mg, Intramuscular, PRN, Vicky Singh MD    glucose chewable tablet 16 g, 16 g, Oral, PRN, Vicky Singh MD    glucose chewable tablet 24 g, 24 g, Oral, PRN, Vicky Singh MD    hydrALAZINE tablet 25 mg, 25 mg, Oral, Q4H PRN, Doe Bowling MD, 25 mg at 03/13/20 2011    insulin aspart U-100 pen 0-5 Units, 0-5 Units, Subcutaneous, QID (AC + HS) PRN, Vicky Singh MD, 2 Units at 03/14/20 0740    insulin detemir  "U-100 pen 4 Units, 4 Units, Subcutaneous, BID, Roberta Reed MD    ondansetron disintegrating tablet 8 mg, 8 mg, Oral, Q8H PRN, Vicky Singh MD, 8 mg at 03/11/20 1515    piperacillin-tazobactam 4.5 g in sodium chloride 0.9% 100 mL IVPB (ready to mix system), 4.5 g, Intravenous, Q8H, Lauro Stewart MD, Last Rate: 25 mL/hr at 03/17/20 1233, 4.5 g at 03/17/20 1233    promethazine (PHENERGAN) 6.25 mg in dextrose 5 % 50 mL IVPB, 6.25 mg, Intravenous, Q6H PRN, Vicky Singh MD, Last Rate: 150 mL/hr at 03/12/20 1842, 6.25 mg at 03/12/20 1842    sodium chloride 0.9% flush 10 mL, 10 mL, Intravenous, PRN, Isaac Rudolph MD      Physical Exam:    No intake or output data in the 24 hours ending 03/17/20 1949  Wt Readings from Last 3 Encounters:   03/13/20 94.8 kg (209 lb)   03/05/20 99.3 kg (219 lb)   02/06/20 99.8 kg (220 lb)       BP (!) 153/90 (BP Location: Left arm, Patient Position: Sitting)   Pulse 95   Temp 97.9 °F (36.6 °C) (Oral)   Resp 16   Ht 6' 1" (1.854 m)   Wt 94.8 kg (209 lb)   SpO2 95%   BMI 27.57 kg/m²     GEN: NAD, conversant and interactive  Resp: normal work of breathing on NC  CV: RRR no m/r/g  GI: soft, NTND  Skin: dry, no rashes    Laboratory:    Recent Labs   Lab 03/13/20  0621 03/14/20  0355 03/15/20  0404   WBC 5.70 7.32 8.18   HGB 10.4* 10.1* 11.5*   HCT 34.4* 33.2* 37.8*    185 233     Recent Labs   Lab 03/12/20  0858  03/13/20  0621  03/14/20  0355 03/14/20  0913 03/15/20  0404 03/17/20  1110      < > 139   < >  --  134* 133* 143   K 3.4*   < > 3.3*   < >  --  3.4* 3.8 3.4*      < > 103   < >  --  99 94* 105   CO2 27   < > 27   < >  --  27 31* 27   BUN 3*   < > 5*   < >  --  8 9 24*   CREATININE 0.9   < > 0.9   < >  --  0.9 1.5* 3.9*   *   < > 231*   < >  --  234* 138* 80   CALCIUM 7.8*   < > 7.7*   < >  --  7.7* 8.0* 7.8*   MG  --   --  1.7  --  1.3*  --  2.8*  --    PHOS 2.1*   < > 1.9*  --   --   --  3.1 3.3   LIPASE 14  --   --   --   -- "   --   --   --     < > = values in this interval not displayed.     Recent Labs   Lab 03/14/20  0913 03/15/20  0404 03/17/20  1110   ALKPHOS 65 84 100   ALT <5* <5* <5*   AST 17 25 38   ALBUMIN 1.8* 1.9* 1.8*   PROT 6.8 7.3 7.3   BILITOT 0.3 0.3 0.4      Recent Labs   Lab 03/16/20  1711 03/16/20  2114 03/16/20  2341 03/17/20  0806 03/17/20  1233 03/17/20  1714   POCTGLUCOSE 82 115* 90 70 99 150*     No results for input(s): CPK, CPKMB, MB, TROPONINI in the last 72 hours.    No results for input(s): LACTATE in the last 72 hours.     Recent Labs   Lab 03/16/20 1711 03/16/20 2114 03/16/20  2341 03/17/20  0806 03/17/20  1233 03/17/20  1714   POCTGLUCOSE 82 115* 90 70 99 150*     Lab Results   Component Value Date    HGBA1C 12.0 (H) 03/12/2020         Microbiology:  Microbiology Results (last 7 days)     Procedure Component Value Units Date/Time    Culture, Anaerobe [[id number]] Collected:  03/12/20 1726    Order Status:  Completed Specimen:  Wound from Foot, Left Updated:  03/17/20 1303     Anaerobic Culture No anaerobes isolated    Blood culture [[id number]] Collected:  03/14/20 0109    Order Status:  Completed Specimen:  Blood Updated:  03/17/20 0613     Blood Culture, Routine No Growth to date      No Growth to date      No Growth to date      No Growth to date    Blood culture [[id number]] Collected:  03/14/20 0109    Order Status:  Completed Specimen:  Blood Updated:  03/17/20 0613     Blood Culture, Routine No Growth to date      No Growth to date      No Growth to date      No Growth to date    Blood culture [[id number]] Collected:  03/12/20 2108    Order Status:  Completed Specimen:  Blood Updated:  03/16/20 2212     Blood Culture, Routine No Growth to date      No Growth to date      No Growth to date      No Growth to date      No Growth to date    Blood culture [[id number]] Collected:  03/11/20 1156    Order Status:  Completed Specimen:  Blood Updated:  03/16/20 1412     Blood Culture, Routine No growth after 5  days.    Aerobic culture [[id number]]  (Abnormal)  (Susceptibility) Collected:  03/12/20 1726    Order Status:  Completed Specimen:  Wound from Foot, Left Updated:  03/16/20 1054     Aerobic Bacterial Culture STAPHYLOCOCCUS AUREUS  Moderate      Culture, Respiratory with Gram Stain [[id number]] Collected:  03/14/20 1339    Order Status:  Completed Specimen:  Respiratory from Sputum, Expectorated Updated:  03/16/20 1008     Respiratory Culture Normal respiratory janes      No S aureus or Pseudomonas isolated.     Gram Stain (Respiratory) <10 epithelial cells per low power field.     Gram Stain (Respiratory) No WBC's     Gram Stain (Respiratory) Many Gram positive cocci     Gram Stain (Respiratory) Few Gram negative rods    Blood culture [[id number]] Collected:  03/10/20 1057    Order Status:  Completed Specimen:  Blood from Peripheral, Antecubital, Left Updated:  03/15/20 1612     Blood Culture, Routine No growth after 5 days.    Respiratory Infection Panel (PCR), Nasopharyngeal [[id number]] Collected:  03/14/20 0306    Order Status:  Completed Specimen:  Nasopharyngeal Swab Updated:  03/14/20 1300     Respiratory Infection Panel Source NP Swab     Adenovirus Not Detected     Coronavirus 229E, Common Cold Virus Not Detected     Coronavirus HKU1, Common Cold Virus Not Detected     Coronavirus NL63, Common Cold Virus Not Detected     Coronavirus OC43, Common Cold Virus Not Detected     Comment: The Coronavirus strains detected in this test cause the common cold.  These strains are not the COVID-19 (novel Coronavirus)strain   associated with the respiratory disease outbreak.          Human Metapneumovirus Not Detected     Human Rhinovirus/Enterovirus Not Detected     Influenza A (subtypes H1, H1-2009,H3) Not Detected     Influenza B Not Detected     Parainfluenza Virus 1 Not Detected     Parainfluenza Virus 2 Not Detected     Parainfluenza Virus 3 Not Detected     Parainfluenza Virus 4 Not Detected     Respiratory Syncytial  Virus Not Detected     Bordetella Parapertussis (KY9830) Not Detected     Bordetella pertussis (ptxP) Not Detected     Chlamydia pneumoniae Not Detected     Mycoplasma pneumoniae Not Detected     Comment: Respiratory Infection Panel testing performed by Multiplex PCR.       Narrative:       For all other respiratory sources, order [id number] -  Respiratory Viral Panel by PCR    Influenza A & B by Molecular [[id number]] Collected:  03/14/20 1022    Order Status:  Completed Specimen:  Nasopharyngeal Swab Updated:  03/14/20 1054     Influenza A, Molecular Negative     Influenza B, Molecular Negative     Flu A & B Source Nasal swab    Culture, Respiratory with Gram Stain [[id number]]     Order Status:  Canceled Specimen:  Respiratory     Blood culture [[id number]]  (Abnormal) Collected:  03/10/20 1213    Order Status:  Completed Specimen:  Blood from Peripheral, Wrist, Right Updated:  03/13/20 1455     Blood Culture, Routine Gram stain aer bottle: Gram positive cocci in clusters resembling Staph       Results called to and read back by: Erma Daniel RN 03/11/2020        14:54      Gram stain mary bottle: Gram positive cocci in clusters resembling Staph      COAGULASE-NEGATIVE STAPHYLOCOCCUS SPECIES  Multiple morphotyes - probable contaminates              Imaging  ECG Results          EKG 12-lead (Final result)  Result time 03/10/20 12:44:04    Final result by Interface, Lab In [facility] (03/10/20 12:44:04)                 Narrative:    Test Reason : E86.0,    Vent. Rate : 103 BPM     Atrial Rate : 107 BPM     P-R Int : 120 ms          QRS Dur : 092 ms      QT Int : 370 ms       P-R-T Axes : 073 078 039 degrees     QTc Int : 485 ms    Sinus tachycardia  Otherwise normal ECG  When compared with ECG of 12-JUL-2019 10:07,  Premature ventricular complexes are no longer Present  Confirmed by BRITTON MARTELL MD ([id number]) on 3/10/2020 12:43:50 PM    Referred By: [email or web address]   SELF           Confirmed By:BRITTON MARTELL MD                               Results for orders placed during the hospital encounter of 03/10/20   Echo Color Flow Doppler? Yes    Narrative · Normal left ventricular systolic function. The estimated ejection   fraction is 55%.  · No wall motion abnormalities.  · Normal LV diastolic function.  · Mild left atrial enlargement.  · Normal right ventricular systolic function.  · Normal central venous pressure (3 mmHg).          X-Ray Chest 1 View  Narrative: EXAMINATION:  XR CHEST 1 VIEW    CLINICAL HISTORY:  increased O2;    TECHNIQUE:  Single frontal view of the chest was performed.    COMPARISON:  07/23/2019    FINDINGS:  Suboptimal inspiration limits characterization.    Heart is normal size.    Mild patchy alveolar infiltrates at the lung bases bilaterally.  Mild right upper lobe infiltrate also..  This could represent mild pneumonia.  Follow-up recommended.  No effusion or pneumothorax.  No acute osseous abnormality.  Impression: Mild bibasilar patchy alveolar infiltrates could represent pneumonia.  Mild right upper lobe infiltrate also.  Recommend follow-up.    This report was flagged in Epic as abnormal.    Electronically signed by: Camilo Rebollar  Date:    03/13/2020  Time:    22:20  Echo Color Flow Doppler? Yes  · Normal left ventricular systolic function. The estimated ejection   fraction is 55%.  · No wall motion abnormalities.  · Normal LV diastolic function.  · Mild left atrial enlargement.  · Normal right ventricular systolic function.  · Normal central venous pressure (3 mmHg).           Assessment and Plan:    Active Hospital Problems    Diagnosis  POA    *Suspected Covid-19 Virus Infection [R68.89]  Yes    Acute hypoxemic respiratory failure [J96.01]  No    Hypomagnesemia [E83.42]  No    Osteomyelitis of left foot [M86.9]  Yes    Bacteremia [R78.81]  Yes    Diabetic ketoacidosis without coma associated with type 2 diabetes mellitus [E11.10]  Yes    Diabetic ulcer of left midfoot associated with type 2 diabetes  mellitus, with bone involvement without evidence of necrosis [E11.621, L97.426]  Yes    Impaired gait and mobility [R26.89]  Yes    Anemia [D64.9]  Yes    Sepsis with acute organ dysfunction [A41.9, R65.20]  No    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 edema, with long-term current use of insulin [E11.3213, E11.65, Z79.4]  Yes     Chronic    Hypokalemia [E87.6]  No    TAHIR (acute kidney injury) [N17.9]  No    Mixed hyperlipidemia [E78.2]  Yes    Essential hypertension [I10]  Yes     Chronic      Resolved Hospital Problems   No resolved problems to display.       Person under investigation for COVID-19  Acute hypoxemic respiratory failure  DDx aspiration event vs multifocal bacterial pneumonia. Hypoxemia persists. Resp failure also may be ARDS related to underlying sepsis from uncontrolled osteomyelitis.   - COVID-19 testing sent.  - Infection Control notified  - Isolation:   - Airborne and Droplet Precautions  - N95 masks must be fit tested, wear eye protection  - 20 second hand hygiene   - Limit visitors per hospital policy  - Diagnostics (leukopenia, hyponatremia, hyperferritinemia, elevated troponin, elevated d-dimer, age, and comorbidities are significant predictors of poor clinical outcome)   - CBC no leukopenia or lymphonenia   - CMP with hyponatremia   - d-dimer, ferritin, troponin, CRP, LDH, Procalcitonin   - ECG   - rapid Flu neg   - RIP neg   - Legionella antigen   - Blood culture x2   - Portable CXR   - UA and culture neg  - Management:   - Supplemental O2 to maintain SpO2 >92%   - Telemetry & Continuous Pulse Ox   - albuterol INHALER PRN (avoid nebulization of secretions)   - No BiPAP to avoid aerosolization (including home BiPAP)   - No steroids unless septic shock due to increased viral replication   - fluid sparing resuscitation   - Empiric antibiotics per likely source & patient allergies    - HCAP/HAP: zosyn, final day is  today     Osteomyelitis of left foot  Podiatry and ID following  - continue IV zosyn   - will need OPAT cefazolin    DKA in the setting of poorly controlled T2DM  - SQ insulin  - diabetic diet    CONS positive blood culture  - likely contaminant  - appreciate ID consult      Patient's chronic/stable medical conditions noted in the progress note above will be managed with the patient's home medications as tolerated.       Roberta Reed M.D., M.P.H.  Department of Hospital Medicine  [facility]  (pager) [phone] (spect) [phone]

## 2020-03-18 NOTE — PLAN OF CARE
Problem: Adult Inpatient Plan of Care  Goal: Plan of Care Review  Outcome: Ongoing, Progressing  Flowsheets (Taken 3/18/2020 4307)  Plan of Care Reviewed With: patient     Pt remains free from falls/injury. No acute events during shift. VSS, NAD. Bed in lowest position, wheels locked, side rails up times 2, call light w/in reach. Room clear of obstacles. Pt remains afibrile during shift. Antibiotics administered as ordered. PPE utilized appropriately. Pt contact minimized, care clustered. Pt BG monitored AC/HS as per order. Pt covered with insulin aspart per low sliding scale. No S&S of hypo/hyperglycemia noticed. Wound care performed per order. WCTM.

## 2020-03-19 LAB
ALBUMIN SERPL BCP-MCNC: 1.7 G/DL (ref 3.5–5.2)
ALP SERPL-CCNC: 125 U/L (ref 55–135)
ALT SERPL W/O P-5'-P-CCNC: 7 U/L (ref 10–44)
ANION GAP SERPL CALC-SCNC: 16 MMOL/L (ref 8–16)
AST SERPL-CCNC: 39 U/L (ref 10–40)
BACTERIA BLD CULT: NORMAL
BACTERIA BLD CULT: NORMAL
BILIRUB SERPL-MCNC: 0.2 MG/DL (ref 0.1–1)
BUN SERPL-MCNC: 22 MG/DL (ref 6–20)
CALCIUM SERPL-MCNC: 8.2 MG/DL (ref 8.7–10.5)
CHLORIDE SERPL-SCNC: 107 MMOL/L (ref 95–110)
CO2 SERPL-SCNC: 26 MMOL/L (ref 23–29)
CREAT SERPL-MCNC: 3.6 MG/DL (ref 0.5–1.4)
EST. GFR  (AFRICAN AMERICAN): 21.3 ML/MIN/1.73 M^2
EST. GFR  (NON AFRICAN AMERICAN): 18.4 ML/MIN/1.73 M^2
GLUCOSE SERPL-MCNC: 115 MG/DL (ref 70–110)
PHOSPHATE SERPL-MCNC: 3.9 MG/DL (ref 2.7–4.5)
POCT GLUCOSE: 114 MG/DL (ref 70–110)
POCT GLUCOSE: 123 MG/DL (ref 70–110)
POCT GLUCOSE: 158 MG/DL (ref 70–110)
POCT GLUCOSE: 162 MG/DL (ref 70–110)
POTASSIUM SERPL-SCNC: 3.9 MMOL/L (ref 3.5–5.1)
PROT SERPL-MCNC: 7.5 G/DL (ref 6–8.4)
SODIUM SERPL-SCNC: 149 MMOL/L (ref 136–145)

## 2020-03-19 PROCEDURE — 99233 SBSQ HOSP IP/OBS HIGH 50: CPT | Mod: ,,, | Performed by: HOSPITALIST

## 2020-03-19 PROCEDURE — C9399 UNCLASSIFIED DRUGS OR BIOLOG: HCPCS | Performed by: INTERNAL MEDICINE

## 2020-03-19 PROCEDURE — 84100 ASSAY OF PHOSPHORUS: CPT

## 2020-03-19 PROCEDURE — 25000003 PHARM REV CODE 250: Performed by: STUDENT IN AN ORGANIZED HEALTH CARE EDUCATION/TRAINING PROGRAM

## 2020-03-19 PROCEDURE — 25000003 PHARM REV CODE 250: Performed by: INTERNAL MEDICINE

## 2020-03-19 PROCEDURE — 99233 PR SUBSEQUENT HOSPITAL CARE,LEVL III: ICD-10-PCS | Mod: ,,, | Performed by: HOSPITALIST

## 2020-03-19 PROCEDURE — 80053 COMPREHEN METABOLIC PANEL: CPT

## 2020-03-19 PROCEDURE — 20600001 HC STEP DOWN PRIVATE ROOM

## 2020-03-19 PROCEDURE — 63600175 PHARM REV CODE 636 W HCPCS: Performed by: INTERNAL MEDICINE

## 2020-03-19 RX ADMIN — INSULIN DETEMIR 4 UNITS: 100 INJECTION, SOLUTION SUBCUTANEOUS at 09:03

## 2020-03-19 RX ADMIN — CEFAZOLIN 2 G: 1 INJECTION, POWDER, FOR SOLUTION INTRAMUSCULAR; INTRAVENOUS at 09:03

## 2020-03-19 RX ADMIN — GABAPENTIN 600 MG: 300 CAPSULE ORAL at 09:03

## 2020-03-19 RX ADMIN — ATORVASTATIN CALCIUM 80 MG: 20 TABLET, FILM COATED ORAL at 09:03

## 2020-03-19 RX ADMIN — ENOXAPARIN SODIUM 30 MG: 30 INJECTION SUBCUTANEOUS at 09:03

## 2020-03-19 NOTE — PROGRESS NOTES
"  [facility]  Adult Nutrition  Consult Note    SUMMARY     Recommendations    1. Add Diabetic diet restrictions to current Regular diet order.   2. Add Boost Glucose Control OS to aid in caloric intake.   3. RD to monitor & follow-up.    Goals: Meet % EEN, EPN by RD f/u date  Nutrition Goal Status: new  Communication of RD Recs: reviewed with RN    Reason for Assessment    Reason For Assessment: RD follow-up  Diagnosis: other (see comments)(Suspected COVID-19)  Relevant Medical History: DM  Interdisciplinary Rounds: did not attend    General Information Comments: Diabetic diet education complete 3/13. RD working remotely. Per RN documentation, pt tolerating diet, consuming 25-75% of meals. Per H & P, pt w/ decreased appetite x 1 week PTA. Pt diagnosed w/ severe malnutrition at time of last visit (7/2019) & weighed 185#. Noted pt w/ weight gain since then. NFPE not performed, patient has been screened for possible COVID-19 and has been placed on airborne and contact precautions. Awaiting results from COVID-19 testing. RD unable to assess for malnutrition, monitoring.  Nutrition Discharge Planning: Adequate PO intake, dietary compliance    Nutrition/Diet History    Spiritual, Cultural Beliefs, Yazdanism Practices, Values that Affect Care: no  Factors Affecting Nutritional Intake: decreased appetite    Anthropometrics    Temp: 97.4 °F (36.3 °C)  Height Method: Stated  Height: 6' 1" (185.4 cm)  Height (inches): 73 in  Weight Method: Bed Scale  Weight: 94.8 kg (208 lb 15.9 oz)  Weight (lb): 209 lb  Ideal Body Weight (IBW), Male: 184 lb  % Ideal Body Weight, Male (lb): 113.59 %  BMI (Calculated): 27.6  BMI Grade: 25 - 29.9 - overweight    Lab/Procedures/Meds    Pertinent Labs Reviewed: reviewed  Pertinent Labs Comments: Na 149, BUN 22, Creat 3.6, GFR 21.3, A1C 12  Pertinent Medications Reviewed: reviewed    Estimated/Assessed Needs    Weight Used For Calorie Calculations: 94.8 kg (208 lb 15.9 oz) "     Energy Calorie Requirements (kcal): 2320 kcal/d  Energy Need Method: Routt-St Jeor(1.25 PAL)     Protein Requirements:  g/d (1-1.2 g/kg)  Weight Used For Protein Calculations: 94.8 kg (208 lb 15.9 oz)     Estimated Fluid Requirement Method: other (see comments)(Per MD or 1 mL/kcal)    Nutrition Prescription Ordered    Current Diet Order: Regular    Evaluation of Received Nutrient/Fluid Intake    Comments: LBM: 3/18    Tolerance: tolerating    Nutrition Risk    Level of Risk/Frequency of Follow-up: (1x/week)     Assessment and Plan    Nutrition Problem  Excessive CHO intake    Related to (etiology):   Food and nutrition related knowledge deficit     Signs and Symptoms (as evidenced by):   A1C 12    Interventions(treatment strategy):  Collaboration of nutrition care w/ other providers     Nutrition Diagnosis Status:   New     Monitor and Evaluation    Food and Nutrient Intake: energy intake, food and beverage intake  Food and Nutrient Adminstration: diet order  Physical Activity and Function: nutrition-related ADLs and IADLs  Anthropometric Measurements: weight, weight change  Biochemical Data, Medical Tests and Procedures: inflammatory profile, lipid profile, glucose/endocrine profile, gastrointestinal profile, electrolyte and renal panel  Nutrition-Focused Physical Findings: overall appearance     Nutrition Follow-Up    RD Follow-up?: Yes

## 2020-03-19 NOTE — PLAN OF CARE
Pt AAOx4, VSS, denies pain. Pt ambulating with boot to left foot with no difficulty. Still awaiting Covid-19 results. BG WNL. Dressing change to left foot completed. Will continue to monitor.

## 2020-03-20 LAB
POCT GLUCOSE: 168 MG/DL (ref 70–110)
POCT GLUCOSE: 168 MG/DL (ref 70–110)
POCT GLUCOSE: 232 MG/DL (ref 70–110)

## 2020-03-20 PROCEDURE — 99291 PR CRITICAL CARE, E/M 30-74 MINUTES: ICD-10-PCS | Mod: ,,, | Performed by: INTERNAL MEDICINE

## 2020-03-20 PROCEDURE — 94770 HC EXHALED C02 TEST: CPT

## 2020-03-20 PROCEDURE — 94761 N-INVAS EAR/PLS OXIMETRY MLT: CPT

## 2020-03-20 PROCEDURE — 99233 PR SUBSEQUENT HOSPITAL CARE,LEVL III: ICD-10-PCS | Mod: ,,, | Performed by: HOSPITALIST

## 2020-03-20 PROCEDURE — 94002 VENT MGMT INPAT INIT DAY: CPT

## 2020-03-20 PROCEDURE — C9399 UNCLASSIFIED DRUGS OR BIOLOG: HCPCS | Performed by: INTERNAL MEDICINE

## 2020-03-20 PROCEDURE — 36556 INSERT NON-TUNNEL CV CATH: CPT | Mod: ,,, | Performed by: ANESTHESIOLOGY

## 2020-03-20 PROCEDURE — 25000003 PHARM REV CODE 250: Performed by: STUDENT IN AN ORGANIZED HEALTH CARE EDUCATION/TRAINING PROGRAM

## 2020-03-20 PROCEDURE — 99900035 HC TECH TIME PER 15 MIN (STAT)

## 2020-03-20 PROCEDURE — 25000003 PHARM REV CODE 250: Performed by: INTERNAL MEDICINE

## 2020-03-20 PROCEDURE — 63600175 PHARM REV CODE 636 W HCPCS: Performed by: INTERNAL MEDICINE

## 2020-03-20 PROCEDURE — 99233 SBSQ HOSP IP/OBS HIGH 50: CPT | Mod: ,,, | Performed by: HOSPITALIST

## 2020-03-20 PROCEDURE — 63600175 PHARM REV CODE 636 W HCPCS: Performed by: STUDENT IN AN ORGANIZED HEALTH CARE EDUCATION/TRAINING PROGRAM

## 2020-03-20 PROCEDURE — 99291 CRITICAL CARE FIRST HOUR: CPT | Mod: ,,, | Performed by: INTERNAL MEDICINE

## 2020-03-20 PROCEDURE — 36556 CENTRAL LINE: ICD-10-PCS | Mod: ,,, | Performed by: ANESTHESIOLOGY

## 2020-03-20 PROCEDURE — 20000000 HC ICU ROOM

## 2020-03-20 RX ORDER — PROPOFOL 10 MG/ML
5 VIAL (ML) INTRAVENOUS CONTINUOUS
Status: DISCONTINUED | OUTPATIENT
Start: 2020-03-21 | End: 2020-03-21

## 2020-03-20 RX ORDER — FENTANYL CITRATE 50 UG/ML
50 INJECTION, SOLUTION INTRAMUSCULAR; INTRAVENOUS
Status: DISCONTINUED | OUTPATIENT
Start: 2020-03-20 | End: 2020-03-20

## 2020-03-20 RX ORDER — NIFEDIPINE 30 MG/1
30 TABLET, EXTENDED RELEASE ORAL DAILY
Status: DISCONTINUED | OUTPATIENT
Start: 2020-03-21 | End: 2020-03-21

## 2020-03-20 RX ORDER — HEPARIN SODIUM 5000 [USP'U]/ML
5000 INJECTION, SOLUTION INTRAVENOUS; SUBCUTANEOUS EVERY 8 HOURS
Status: DISCONTINUED | OUTPATIENT
Start: 2020-03-20 | End: 2020-03-23

## 2020-03-20 RX ORDER — FAMOTIDINE 10 MG/ML
20 INJECTION INTRAVENOUS DAILY
Status: DISCONTINUED | OUTPATIENT
Start: 2020-03-20 | End: 2020-03-27

## 2020-03-20 RX ORDER — FENTANYL CITRATE 50 UG/ML
50 INJECTION, SOLUTION INTRAMUSCULAR; INTRAVENOUS
Status: DISCONTINUED | OUTPATIENT
Start: 2020-03-30 | End: 2020-03-20

## 2020-03-20 RX ORDER — FENTANYL CITRATE-0.9 % NACL/PF 10 MCG/ML
25 PLASTIC BAG, INJECTION (ML) INTRAVENOUS CONTINUOUS
Status: DISCONTINUED | OUTPATIENT
Start: 2020-03-21 | End: 2020-03-20

## 2020-03-20 RX ORDER — NOREPINEPHRINE BITARTRATE/D5W 4MG/250ML
0.02 PLASTIC BAG, INJECTION (ML) INTRAVENOUS CONTINUOUS
Status: DISCONTINUED | OUTPATIENT
Start: 2020-03-21 | End: 2020-03-21

## 2020-03-20 RX ORDER — FENTANYL CITRATE-0.9 % NACL/PF 10 MCG/ML
25 PLASTIC BAG, INJECTION (ML) INTRAVENOUS CONTINUOUS
Status: DISCONTINUED | OUTPATIENT
Start: 2020-03-21 | End: 2020-03-27

## 2020-03-20 RX ADMIN — CEFAZOLIN 2 G: 1 INJECTION, POWDER, FOR SOLUTION INTRAMUSCULAR; INTRAVENOUS at 09:03

## 2020-03-20 RX ADMIN — INSULIN DETEMIR 4 UNITS: 100 INJECTION, SOLUTION SUBCUTANEOUS at 09:03

## 2020-03-20 RX ADMIN — INSULIN ASPART 1 UNITS: 100 INJECTION, SOLUTION INTRAVENOUS; SUBCUTANEOUS at 09:03

## 2020-03-20 RX ADMIN — ATORVASTATIN CALCIUM 80 MG: 20 TABLET, FILM COATED ORAL at 08:03

## 2020-03-20 RX ADMIN — HYDRALAZINE HYDROCHLORIDE 25 MG: 25 TABLET, FILM COATED ORAL at 08:03

## 2020-03-20 RX ADMIN — GABAPENTIN 600 MG: 300 CAPSULE ORAL at 09:03

## 2020-03-20 RX ADMIN — CEFAZOLIN 2 G: 1 INJECTION, POWDER, FOR SOLUTION INTRAMUSCULAR; INTRAVENOUS at 08:03

## 2020-03-20 RX ADMIN — ENOXAPARIN SODIUM 30 MG: 30 INJECTION SUBCUTANEOUS at 09:03

## 2020-03-20 RX ADMIN — INSULIN DETEMIR 4 UNITS: 100 INJECTION, SOLUTION SUBCUTANEOUS at 08:03

## 2020-03-20 NOTE — PROGRESS NOTES
Hospital Medicine  Progress Note  [facility]      Patient Name: Indio Ryder Jr.  MRN:  [id number]  Hospital Medicine Team: [facility] O Negro Mayfield MD  Date of Admission:  3/10/2020     Length of Stay:  LOS: 9 days       Principal Problem:  Suspected Covid-19 Virus Infection      HPI:  Mr. Ryder is 50 y/o with a history of poorly controlled DM2 ( last Ha1c 8.4 ) , DKA with coma, diabetic neuropathy, diabatic retinopathy and diabetic foot with multiple amputation who presents to ED with chief complaint of chills associated with nausea and vomiting for one day. He sates that yesterday he started to feel ill and he has been having chills associated with nauseas and vomiting. He also endorses poor appetite with decreased oral intake for the last week. Denies fever, abdominal pain, feet or leg pain, leg swelling, CP, SOB, dysuria. He a history of poorly control diabetes with amputation of his right big and 5th toes. He undergone left big toe detriment and imputation on 7/14/2019. He reports not feeling well for the last couple of weeks and he felt about to pass out on Mardi gras and his BP was in 90/50. He was seen by podiatry last  Thursday when he had his wound opened and he was prescribed doxycycline for 10 days.      In ED, he was afebrile, vitally stable. Lab, BS: 382, B-hydroxybutyrate 3.6, HCO3 22,  anion gap 18. WBC: 6.9, lactate: 1.2  X-ray of right food shows soft tissue edema concerning for osteomyelitis. He received single dose of vanc/zosyn and 2L IVF.      Overview/Hospital Course:  Patient with a history of DM2, DKA with coma, osteomyelitis, multiple amputation who present with chills, N/V and found with DKA. Started on insuline gtt and IVF infusion. Pt is currently treated with doxycyline for wound in left foot. ID and podiatry consulted. MRI with acute osteomyelitis of 4th metatarsal head and neck of left proximal phalanx. ID recommended cefazolin and podiatry  plan for for amputation. Pt is refusing amputation but agree to have agressive washout if he keep spiking fever. Pt transitioned to subQ insulin and tolerating PO. Blood ux grwe Staph coagulase negative, TTE done and shows normal valves with no vegetations. Pt had drop in his SpO2 to 85 with need for 5L oxygen. Respiratory cx with many GPC and CXR shows b/l patchy infiltration. Pt still spiking fever and in acute respiratory failure. Concern for COVID-19, test is sent.     Continues to feel well, afebrile. Finished with zosyn. ID recs start cefazolin. 95% on 4L NC. Will continue to wean as tolerated.       Interval History:     TAHIR is improving and urine output remains steady.  Patient is still on 3 L nasal cannula and will require further weaning prior to discharge.  He denies any worsening in respiratory symptoms or fevers.    Review of Systems:  Respiratory: no cough, dyspnea  Cardiovascular: no chest pain, palpitations  GI: no diarrhea    Inpatient Medications:    Current Facility-Administered Medications:     acetaminophen tablet 650 mg, 650 mg, Oral, Q8H PRN, Antonio Valladares MD, 650 mg at 03/14/20 2108    albuterol inhaler 4 puff, 4 puff, Inhalation, Q6H PRN, Roberta Reed MD    atorvastatin tablet 80 mg, 80 mg, Oral, Daily, Vicky Singh MD, 80 mg at 03/19/20 0920    ceFAZolin injection 2 g, 2 g, Intravenous, Q12H, Roberta Reed MD, 2 g at 03/19/20 0920    dextrose 10% (D10W) Bolus, 12.5 g, Intravenous, PRN, Roberta Reed MD    dextrose 10% (D10W) Bolus, 25 g, Intravenous, PRN, Roberta Reed MD    enoxaparin injection 30 mg, 30 mg, Subcutaneous, QHS, Roberta Reed MD, 30 mg at 03/18/20 2129    gabapentin capsule 600 mg, 600 mg, Oral, QHS, Roberta Reed MD, 600 mg at 03/18/20 2129    glucagon (human recombinant) injection 1 mg, 1 mg, Intramuscular, PRN, Vicky Singh MD    glucose chewable tablet 16 g, 16 g, Oral, PRN, Vicky Singh MD    glucose  "chewable tablet 24 g, 24 g, Oral, PRN, Vicky Singh MD    hydrALAZINE tablet 25 mg, 25 mg, Oral, Q4H PRN, Doe Bowling MD, 25 mg at 03/13/20 2011    insulin aspart U-100 pen 0-5 Units, 0-5 Units, Subcutaneous, QID (AC + HS) PRN, Vicky Singh MD, 2 Units at 03/18/20 1129    insulin detemir U-100 pen 4 Units, 4 Units, Subcutaneous, BID, Roberta Reed MD, 4 Units at 03/19/20 0925    ondansetron disintegrating tablet 8 mg, 8 mg, Oral, Q8H PRN, Vicky Singh MD, 8 mg at 03/11/20 1515    promethazine (PHENERGAN) 6.25 mg in dextrose 5 % 50 mL IVPB, 6.25 mg, Intravenous, Q6H PRN, Vicky Singh MD, Last Rate: 150 mL/hr at 03/12/20 1842, 6.25 mg at 03/12/20 1842    sodium chloride 0.9% flush 10 mL, 10 mL, Intravenous, PRN, Isaac Rudolph MD      Physical Exam:      Intake/Output Summary (Last 24 hours) at 3/19/2020 1952  Last data filed at 3/19/2020 1032  Gross per 24 hour   Intake 240 ml   Output 3100 ml   Net -2860 ml     Wt Readings from Last 3 Encounters:   03/19/20 94.8 kg (208 lb 15.9 oz)   03/05/20 99.3 kg (219 lb)   02/06/20 99.8 kg (220 lb)       BP (!) 145/99 (BP Location: Left arm, Patient Position: Lying)   Pulse 101   Temp 99.1 °F (37.3 °C) (Oral)   Resp 18   Ht 6' 1" (1.854 m)   Wt 94.8 kg (208 lb 15.9 oz)   SpO2 (!) 89%   BMI 27.57 kg/m²     GEN: NAD, conversant and interactive  Resp: normal work of breathing on NC  CV: RRR   GI: soft, NTND  Skin: dry, no rashes    Laboratory:    Recent Labs   Lab 03/13/20  0621 03/14/20  0355 03/15/20  0404   WBC 5.70 7.32 8.18   HGB 10.4* 10.1* 11.5*   HCT 34.4* 33.2* 37.8*    185 233     Recent Labs   Lab 03/13/20  0621  03/14/20  0355  03/15/20  0404 03/17/20  1110 03/19/20  0635      < >  --    < > 133* 143 149*   K 3.3*   < >  --    < > 3.8 3.4* 3.9      < >  --    < > 94* 105 107   CO2 27   < >  --    < > 31* 27 26   BUN 5*   < >  --    < > 9 24* 22*   CREATININE 0.9   < >  --    < > 1.5* 3.9* 3.6*   GLU " 231*   < >  --    < > 138* 80 115*   CALCIUM 7.7*   < >  --    < > 8.0* 7.8* 8.2*   MG 1.7  --  1.3*  --  2.8*  --   --    PHOS 1.9*  --   --   --  3.1 3.3 3.9    < > = values in this interval not displayed.     Recent Labs   Lab 03/15/20  0404 03/17/20  1110 03/19/20  0635   ALKPHOS 84 100 125   ALT <5* <5* 7*   AST 25 38 39   ALBUMIN 1.9* 1.8* 1.7*   PROT 7.3 7.3 7.5   BILITOT 0.3 0.4 0.2      Recent Labs   Lab 03/18/20  1126 03/18/20  1628 03/18/20 2125 03/19/20  0919 03/19/20  1131 03/19/20  1715   POCTGLUCOSE 205* 158* 138* 114* 123* 162*     No results for input(s): CPK, CPKMB, MB, TROPONINI in the last 72 hours.    No results for input(s): LACTATE in the last 72 hours.     Recent Labs   Lab 03/18/20  1126 03/18/20  1628 03/18/20 2125 03/19/20  0919 03/19/20  1131 03/19/20  1715   POCTGLUCOSE 205* 158* 138* 114* 123* 162*     Lab Results   Component Value Date    HGBA1C 12.0 (H) 03/12/2020         Microbiology:  Microbiology Results (last 7 days)     Procedure Component Value Units Date/Time    Blood culture [[id number]] Collected:  03/14/20 0109    Order Status:  Completed Specimen:  Blood Updated:  03/19/20 0612     Blood Culture, Routine No growth after 5 days.    Blood culture [[id number]] Collected:  03/14/20 0109    Order Status:  Completed Specimen:  Blood Updated:  03/19/20 0612     Blood Culture, Routine No growth after 5 days.    Blood culture [[id number]] Collected:  03/12/20 2108    Order Status:  Completed Specimen:  Blood Updated:  03/17/20 2212     Blood Culture, Routine No growth after 5 days.    Culture, Anaerobe [[id number]] Collected:  03/12/20 1726    Order Status:  Completed Specimen:  Wound from Foot, Left Updated:  03/17/20 1303     Anaerobic Culture No anaerobes isolated    Blood culture [[id number]] Collected:  03/11/20 1156    Order Status:  Completed Specimen:  Blood Updated:  03/16/20 1412     Blood Culture, Routine No growth after 5 days.    Aerobic culture [[id number]]  (Abnormal)   (Susceptibility) Collected:  03/12/20 1726    Order Status:  Completed Specimen:  Wound from Foot, Left Updated:  03/16/20 1054     Aerobic Bacterial Culture STAPHYLOCOCCUS AUREUS  Moderate      Culture, Respiratory with Gram Stain [[id number]] Collected:  03/14/20 1339    Order Status:  Completed Specimen:  Respiratory from Sputum, Expectorated Updated:  03/16/20 1008     Respiratory Culture Normal respiratory janes      No S aureus or Pseudomonas isolated.     Gram Stain (Respiratory) <10 epithelial cells per low power field.     Gram Stain (Respiratory) No WBC's     Gram Stain (Respiratory) Many Gram positive cocci     Gram Stain (Respiratory) Few Gram negative rods    Blood culture [[id number]] Collected:  03/10/20 1057    Order Status:  Completed Specimen:  Blood from Peripheral, Antecubital, Left Updated:  03/15/20 1612     Blood Culture, Routine No growth after 5 days.    Respiratory Infection Panel (PCR), Nasopharyngeal [[id number]] Collected:  03/14/20 0306    Order Status:  Completed Specimen:  Nasopharyngeal Swab Updated:  03/14/20 1300     Respiratory Infection Panel Source NP Swab     Adenovirus Not Detected     Coronavirus 229E, Common Cold Virus Not Detected     Coronavirus HKU1, Common Cold Virus Not Detected     Coronavirus NL63, Common Cold Virus Not Detected     Coronavirus OC43, Common Cold Virus Not Detected     Comment: The Coronavirus strains detected in this test cause the common cold.  These strains are not the COVID-19 (novel Coronavirus)strain   associated with the respiratory disease outbreak.          Human Metapneumovirus Not Detected     Human Rhinovirus/Enterovirus Not Detected     Influenza A (subtypes H1, H1-2009,H3) Not Detected     Influenza B Not Detected     Parainfluenza Virus 1 Not Detected     Parainfluenza Virus 2 Not Detected     Parainfluenza Virus 3 Not Detected     Parainfluenza Virus 4 Not Detected     Respiratory Syncytial Virus Not Detected     Bordetella Parapertussis  (XO7803) Not Detected     Bordetella pertussis (ptxP) Not Detected     Chlamydia pneumoniae Not Detected     Mycoplasma pneumoniae Not Detected     Comment: Respiratory Infection Panel testing performed by Multiplex PCR.       Narrative:       For all other respiratory sources, order [id number] -  Respiratory Viral Panel by PCR    Influenza A & B by Molecular [[id number]] Collected:  03/14/20 1022    Order Status:  Completed Specimen:  Nasopharyngeal Swab Updated:  03/14/20 1054     Influenza A, Molecular Negative     Influenza B, Molecular Negative     Flu A & B Source Nasal swab    Culture, Respiratory with Gram Stain [[id number]]     Order Status:  Canceled Specimen:  Respiratory     Blood culture [[id number]]  (Abnormal) Collected:  03/10/20 1213    Order Status:  Completed Specimen:  Blood from Peripheral, Wrist, Right Updated:  03/13/20 1455     Blood Culture, Routine Gram stain aer bottle: Gram positive cocci in clusters resembling Staph       Results called to and read back by: Erma Daniel RN 03/11/2020        14:54      Gram stain mary bottle: Gram positive cocci in clusters resembling Staph      COAGULASE-NEGATIVE STAPHYLOCOCCUS SPECIES  Multiple morphotyes - probable contaminates              Imaging  ECG Results          EKG 12-lead (Final result)  Result time 03/10/20 12:44:04    Final result by Interface, Lab In [facility] (03/10/20 12:44:04)                 Narrative:    Test Reason : E86.0,    Vent. Rate : 103 BPM     Atrial Rate : 107 BPM     P-R Int : 120 ms          QRS Dur : 092 ms      QT Int : 370 ms       P-R-T Axes : 073 078 039 degrees     QTc Int : 485 ms    Sinus tachycardia  Otherwise normal ECG  When compared with ECG of 12-JUL-2019 10:07,  Premature ventricular complexes are no longer Present  Confirmed by BRITTON MARTELL MD ([id number]) on 3/10/2020 12:43:50 PM    Referred By: [email or web address]   SELF           Confirmed By:BRITTON MARTELL MD                              Results for orders placed during  the hospital encounter of 03/10/20   Echo Color Flow Doppler? Yes    Narrative · Normal left ventricular systolic function. The estimated ejection   fraction is 55%.  · No wall motion abnormalities.  · Normal LV diastolic function.  · Mild left atrial enlargement.  · Normal right ventricular systolic function.  · Normal central venous pressure (3 mmHg).          X-Ray Chest 1 View  Narrative: EXAMINATION:  XR CHEST 1 VIEW    CLINICAL HISTORY:  increased O2;    TECHNIQUE:  Single frontal view of the chest was performed.    COMPARISON:  07/23/2019    FINDINGS:  Suboptimal inspiration limits characterization.    Heart is normal size.    Mild patchy alveolar infiltrates at the lung bases bilaterally.  Mild right upper lobe infiltrate also..  This could represent mild pneumonia.  Follow-up recommended.  No effusion or pneumothorax.  No acute osseous abnormality.  Impression: Mild bibasilar patchy alveolar infiltrates could represent pneumonia.  Mild right upper lobe infiltrate also.  Recommend follow-up.    This report was flagged in Epic as abnormal.    Electronically signed by: Camilo Rebollar  Date:    03/13/2020  Time:    22:20  Echo Color Flow Doppler? Yes  · Normal left ventricular systolic function. The estimated ejection   fraction is 55%.  · No wall motion abnormalities.  · Normal LV diastolic function.  · Mild left atrial enlargement.  · Normal right ventricular systolic function.  · Normal central venous pressure (3 mmHg).           Assessment and Plan:    Active Hospital Problems    Diagnosis  POA    *Suspected Covid-19 Virus Infection [R68.89]  Yes    Acute hypoxemic respiratory failure [J96.01]  No    Hypomagnesemia [E83.42]  No    Osteomyelitis of left foot [M86.9]  Yes    Bacteremia [R78.81]  Yes    Diabetic ketoacidosis without coma associated with type 2 diabetes mellitus [E11.10]  Yes    Diabetic ulcer of left midfoot associated with type 2 diabetes mellitus, with bone involvement without  evidence of necrosis [E11.621, L97.426]  Yes    Impaired gait and mobility [R26.89]  Yes    Anemia [D64.9]  Yes    Sepsis with acute organ dysfunction [A41.9, R65.20]  No    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 edema, with long-term current use of insulin [E11.3213, E11.65, Z79.4]  Yes     Chronic    Hypokalemia [E87.6]  No    TAHIR (acute kidney injury) [N17.9]  No    Mixed hyperlipidemia [E78.2]  Yes    Essential hypertension [I10]  Yes     Chronic      Resolved Hospital Problems   No resolved problems to display.       Person under investigation for COVID-19  Acute hypoxemic respiratory failure  DDx aspiration event vs multifocal bacterial pneumonia. Hypoxemia persists. Resp failure also may be ARDS related to underlying sepsis from uncontrolled osteomyelitis.   - COVID-19 testing sent.  - Infection Control notified  - Isolation:   - Airborne and Droplet Precautions  - N95 masks must be fit tested, wear eye protection  - 20 second hand hygiene   - Limit visitors per hospital policy  - Diagnostics (leukopenia, hyponatremia, hyperferritinemia, elevated troponin, elevated d-dimer, age, and comorbidities are significant predictors of poor clinical outcome)   - CBC no leukopenia or lymphonenia   - CMP with hyponatremia   - d-dimer, ferritin, troponin, CRP, LDH, Procalcitonin   - ECG nonishcemic   - rapid Flu neg   - RIP neg   - Legionella antigen   - Blood culture x2   - Portable CXR   - UA and culture neg  - Management:   - Supplemental O2 to maintain SpO2 >92%   - Telemetry & Continuous Pulse Ox   - albuterol INHALER PRN (avoid nebulization of secretions)   - No BiPAP to avoid aerosolization (including home BiPAP)   - No steroids unless septic shock due to increased viral replication   - fluid sparing resuscitation   - Empiric antibiotics per likely source & patient allergies    - HCAP/HAP: zosyn, final day is 3/17,  complete    Osteomyelitis of left foot  Podiatry and ID following  - s/p iv zosyn  - restarted cefazolin per ID, pharm D for dosing  - will need OPAT cefazolin once stable    TAHIR  Suspect prerenal vs AIN from zosyn vs. Vanc toxicity  - urinalysis/urine electrolytes consistent with infrarenal dz  - making good urine, suspect improvement  - CMP Q 48 hr    DKA in the setting of poorly controlled T2DM  - SQ insulin  - diabetic diet    CONS positive blood culture  - likely contaminant  - appreciate ID consult      Patient's chronic/stable medical conditions noted in the progress note above will be managed with the patient's home medications as tolerated.       Negro Mayfield MD  Hospital Medicine  Pager: [phone]  Spectra: [phone]

## 2020-03-20 NOTE — PLAN OF CARE
POC reviewed with patient; understanding verbalized. Pt had no complaints since assuming care. Pt. with nonskid footwear on, bed in lowest position, and locked with bed rails up x2. Pt. has call light and personal items within reach. Patient ambulates in room independently. VSS and afebrile this shift. All questions and concerns addressed at this time. Will continue to monitor.

## 2020-03-20 NOTE — PROGRESS NOTES
Hospital Medicine  Progress Note  [facility]      Patient Name: Indio Ryder Jr.  MRN:  [id number]  Hospital Medicine Team: [facility] MED O Negro Mayfield MD  Date of Admission:  3/10/2020     Length of Stay:  LOS: 10 days       Principal Problem:  Suspected Covid-19 Virus Infection      HPI:  Mr. Ryder is 50 y/o with a history of poorly controlled DM2 ( last Ha1c 8.4 ) , DKA with coma, diabetic neuropathy, diabatic retinopathy and diabetic foot with multiple amputation who presents to ED with chief complaint of chills associated with nausea and vomiting for one day. He sates that yesterday he started to feel ill and he has been having chills associated with nauseas and vomiting. He also endorses poor appetite with decreased oral intake for the last week. Denies fever, abdominal pain, feet or leg pain, leg swelling, CP, SOB, dysuria. He a history of poorly control diabetes with amputation of his right big and 5th toes. He undergone left big toe detriment and imputation on 7/14/2019. He reports not feeling well for the last couple of weeks and he felt about to pass out on Mardi gras and his BP was in 90/50. He was seen by podiatry last  Thursday when he had his wound opened and he was prescribed doxycycline for 10 days.      In ED, he was afebrile, vitally stable. Lab, BS: 382, B-hydroxybutyrate 3.6, HCO3 22,  anion gap 18. WBC: 6.9, lactate: 1.2  X-ray of right food shows soft tissue edema concerning for osteomyelitis. He received single dose of vanc/zosyn and 2L IVF.      Overview/Hospital Course:  Patient with a history of DM2, DKA with coma, osteomyelitis, multiple amputation who present with chills, N/V and found with DKA. Started on insuline gtt and IVF infusion. Pt is currently treated with doxycyline for wound in left foot. ID and podiatry consulted. MRI with acute osteomyelitis of 4th metatarsal head and neck of left proximal phalanx. ID recommended cefazolin and podiatry  plan for for amputation. Pt is refusing amputation but agree to have agressive washout if he keep spiking fever. Pt transitioned to subQ insulin and tolerating PO. Blood ux grwe Staph coagulase negative, TTE done and shows normal valves with no vegetations. Pt had drop in his SpO2 to 85 with need for 5L oxygen. Respiratory cx with many GPC and CXR shows b/l patchy infiltration. Pt still spiking fever and in acute respiratory failure. Concern for COVID-19, test is sent.     Continues to feel well, afebrile. Finished with zosyn. ID recs start cefazolin. 95% on 4L NC. Will continue to wean as tolerated.       Interval History:     Still with improving urine output over the course of the day.  Has had increasing oxygen requirements overnight; however, he reports that he is still stable and his respiratory status and that shortness of breath has not clinically worsened.  He is speaking in full sentences to me on the phone and appears comfortable upon 2 separate assessments via phone through the window today.    Review of Systems:  Respiratory: no cough, dyspnea  Cardiovascular: no chest pain, palpitations  GI: no diarrhea    Inpatient Medications:    Current Facility-Administered Medications:     acetaminophen tablet 650 mg, 650 mg, Oral, Q8H PRN, Antonio Valladares MD, 650 mg at 03/14/20 2108    albuterol inhaler 4 puff, 4 puff, Inhalation, Q6H PRN, Roberta Reed MD    atorvastatin tablet 80 mg, 80 mg, Oral, Daily, Vicky Singh MD, 80 mg at 03/20/20 0824    ceFAZolin injection 2 g, 2 g, Intravenous, Q12H, Roberta Reed MD, 2 g at 03/20/20 0824    dextrose 10% (D10W) Bolus, 12.5 g, Intravenous, PRN, Roberta Reed MD    dextrose 10% (D10W) Bolus, 25 g, Intravenous, PRN, Roberta Reed MD    enoxaparin injection 30 mg, 30 mg, Subcutaneous, QHS, Roberta Reed MD, 30 mg at 03/19/20 2146    gabapentin capsule 600 mg, 600 mg, Oral, QHS, Roberta Reed MD, 600 mg at 03/19/20 2146    " glucagon (human recombinant) injection 1 mg, 1 mg, Intramuscular, PRN, Vicky Singh MD    glucose chewable tablet 16 g, 16 g, Oral, PRN, Vicky Singh MD    glucose chewable tablet 24 g, 24 g, Oral, PRN, Vicky Singh MD    hydrALAZINE tablet 25 mg, 25 mg, Oral, Q4H PRN, Doe Bowling MD, 25 mg at 03/20/20 0824    insulin aspart U-100 pen 0-5 Units, 0-5 Units, Subcutaneous, QID (AC + HS) PRN, Vicky Singh MD, 2 Units at 03/18/20 1129    insulin detemir U-100 pen 4 Units, 4 Units, Subcutaneous, BID, Roberta Reed MD, 4 Units at 03/20/20 0827    [START ON 3/21/2020] NIFEdipine 24 hr tablet 30 mg, 30 mg, Oral, Daily, Negro Mayfield MD    ondansetron disintegrating tablet 8 mg, 8 mg, Oral, Q8H PRN, Vicky Singh MD, 8 mg at 03/11/20 1515    promethazine (PHENERGAN) 6.25 mg in dextrose 5 % 50 mL IVPB, 6.25 mg, Intravenous, Q6H PRN, Vicky Singh MD, Last Rate: 150 mL/hr at 03/12/20 1842, 6.25 mg at 03/12/20 1842    sodium chloride 0.9% flush 10 mL, 10 mL, Intravenous, PRN, Isaac Rudolph MD      Physical Exam:      Intake/Output Summary (Last 24 hours) at 3/20/2020 1728  Last data filed at 3/20/2020 1252  Gross per 24 hour   Intake 400 ml   Output 2900 ml   Net -2500 ml     Wt Readings from Last 3 Encounters:   03/19/20 94.8 kg (208 lb 15.9 oz)   03/05/20 99.3 kg (219 lb)   02/06/20 99.8 kg (220 lb)       BP (!) 166/104 (BP Location: Right arm, Patient Position: Lying)   Pulse 104   Temp 97.4 °F (36.3 °C) (Oral)   Resp 17   Ht 6' 1" (1.854 m)   Wt 94.8 kg (208 lb 15.9 oz)   SpO2 (!) 89%   BMI 27.57 kg/m²     GEN: NAD, conversant and interactive  Resp: normal work of breathing on NC  GI: soft, NTND  Skin: dry, no rashes    Laboratory:    Recent Labs   Lab 03/14/20  0355 03/15/20  0404   WBC 7.32 8.18   HGB 10.1* 11.5*   HCT 33.2* 37.8*    233     Recent Labs   Lab 03/14/20  0355  03/15/20  0404 03/17/20  1110 03/19/20  0635   NA  --    < > 133* " 143 149*   K  --    < > 3.8 3.4* 3.9   CL  --    < > 94* 105 107   CO2  --    < > 31* 27 26   BUN  --    < > 9 24* 22*   CREATININE  --    < > 1.5* 3.9* 3.6*   GLU  --    < > 138* 80 115*   CALCIUM  --    < > 8.0* 7.8* 8.2*   MG 1.3*  --  2.8*  --   --    PHOS  --   --  3.1 3.3 3.9    < > = values in this interval not displayed.     Recent Labs   Lab 03/15/20  0404 03/17/20  1110 03/19/20  0635   ALKPHOS 84 100 125   ALT <5* <5* 7*   AST 25 38 39   ALBUMIN 1.9* 1.8* 1.7*   PROT 7.3 7.3 7.5   BILITOT 0.3 0.4 0.2      Recent Labs   Lab 03/19/20  0919 03/19/20  1131 03/19/20  1715 03/19/20 2139 03/20/20  0818 03/20/20  1634   POCTGLUCOSE 114* 123* 162* 158* 168* 168*     No results for input(s): CPK, CPKMB, MB, TROPONINI in the last 72 hours.    No results for input(s): LACTATE in the last 72 hours.     Recent Labs   Lab 03/19/20  0919 03/19/20  1131 03/19/20  1715 03/19/20  2139 03/20/20  0818 03/20/20  1634   POCTGLUCOSE 114* 123* 162* 158* 168* 168*     Lab Results   Component Value Date    HGBA1C 12.0 (H) 03/12/2020         Microbiology:  Microbiology Results (last 7 days)     Procedure Component Value Units Date/Time    Blood culture [[id number]] Collected:  03/14/20 0109    Order Status:  Completed Specimen:  Blood Updated:  03/19/20 0612     Blood Culture, Routine No growth after 5 days.    Blood culture [[id number]] Collected:  03/14/20 0109    Order Status:  Completed Specimen:  Blood Updated:  03/19/20 0612     Blood Culture, Routine No growth after 5 days.    Blood culture [[id number]] Collected:  03/12/20 2108    Order Status:  Completed Specimen:  Blood Updated:  03/17/20 2212     Blood Culture, Routine No growth after 5 days.    Culture, Anaerobe [[id number]] Collected:  03/12/20 1726    Order Status:  Completed Specimen:  Wound from Foot, Left Updated:  03/17/20 1303     Anaerobic Culture No anaerobes isolated    Blood culture [[id number]] Collected:  03/11/20 1156    Order Status:  Completed Specimen:   Blood Updated:  03/16/20 1412     Blood Culture, Routine No growth after 5 days.    Aerobic culture [[id number]]  (Abnormal)  (Susceptibility) Collected:  03/12/20 1726    Order Status:  Completed Specimen:  Wound from Foot, Left Updated:  03/16/20 1054     Aerobic Bacterial Culture STAPHYLOCOCCUS AUREUS  Moderate      Culture, Respiratory with Gram Stain [[id number]] Collected:  03/14/20 1339    Order Status:  Completed Specimen:  Respiratory from Sputum, Expectorated Updated:  03/16/20 1008     Respiratory Culture Normal respiratory janes      No S aureus or Pseudomonas isolated.     Gram Stain (Respiratory) <10 epithelial cells per low power field.     Gram Stain (Respiratory) No WBC's     Gram Stain (Respiratory) Many Gram positive cocci     Gram Stain (Respiratory) Few Gram negative rods    Blood culture [[id number]] Collected:  03/10/20 1057    Order Status:  Completed Specimen:  Blood from Peripheral, Antecubital, Left Updated:  03/15/20 1612     Blood Culture, Routine No growth after 5 days.    Respiratory Infection Panel (PCR), Nasopharyngeal [[id number]] Collected:  03/14/20 0306    Order Status:  Completed Specimen:  Nasopharyngeal Swab Updated:  03/14/20 1300     Respiratory Infection Panel Source NP Swab     Adenovirus Not Detected     Coronavirus 229E, Common Cold Virus Not Detected     Coronavirus HKU1, Common Cold Virus Not Detected     Coronavirus NL63, Common Cold Virus Not Detected     Coronavirus OC43, Common Cold Virus Not Detected     Comment: The Coronavirus strains detected in this test cause the common cold.  These strains are not the COVID-19 (novel Coronavirus)strain   associated with the respiratory disease outbreak.          Human Metapneumovirus Not Detected     Human Rhinovirus/Enterovirus Not Detected     Influenza A (subtypes H1, H1-2009,H3) Not Detected     Influenza B Not Detected     Parainfluenza Virus 1 Not Detected     Parainfluenza Virus 2 Not Detected     Parainfluenza Virus 3  Not Detected     Parainfluenza Virus 4 Not Detected     Respiratory Syncytial Virus Not Detected     Bordetella Parapertussis (IR0227) Not Detected     Bordetella pertussis (ptxP) Not Detected     Chlamydia pneumoniae Not Detected     Mycoplasma pneumoniae Not Detected     Comment: Respiratory Infection Panel testing performed by Multiplex PCR.       Narrative:       For all other respiratory sources, order [id number] -  Respiratory Viral Panel by PCR    Influenza A & B by Molecular [[id number]] Collected:  03/14/20 1022    Order Status:  Completed Specimen:  Nasopharyngeal Swab Updated:  03/14/20 1054     Influenza A, Molecular Negative     Influenza B, Molecular Negative     Flu A & B Source Nasal swab    Culture, Respiratory with Gram Stain [[id number]]     Order Status:  Canceled Specimen:  Respiratory             Imaging  ECG Results          EKG 12-lead (Final result)  Result time 03/10/20 12:44:04    Final result by Interface, Lab In [facility] (03/10/20 12:44:04)                 Narrative:    Test Reason : E86.0,    Vent. Rate : 103 BPM     Atrial Rate : 107 BPM     P-R Int : 120 ms          QRS Dur : 092 ms      QT Int : 370 ms       P-R-T Axes : 073 078 039 degrees     QTc Int : 485 ms    Sinus tachycardia  Otherwise normal ECG  When compared with ECG of 12-JUL-2019 10:07,  Premature ventricular complexes are no longer Present  Confirmed by BRITTON MARTELL MD ([id number]) on 3/10/2020 12:43:50 PM    Referred By: [email or web address]   SELF           Confirmed By:BRITTON MARTELL MD                              Results for orders placed during the hospital encounter of 03/10/20   Echo Color Flow Doppler? Yes    Narrative · Normal left ventricular systolic function. The estimated ejection   fraction is 55%.  · No wall motion abnormalities.  · Normal LV diastolic function.  · Mild left atrial enlargement.  · Normal right ventricular systolic function.  · Normal central venous pressure (3 mmHg).          X-Ray Chest 1 View  Narrative:  EXAMINATION:  XR CHEST 1 VIEW    CLINICAL HISTORY:  increased O2;    TECHNIQUE:  Single frontal view of the chest was performed.    COMPARISON:  07/23/2019    FINDINGS:  Suboptimal inspiration limits characterization.    Heart is normal size.    Mild patchy alveolar infiltrates at the lung bases bilaterally.  Mild right upper lobe infiltrate also..  This could represent mild pneumonia.  Follow-up recommended.  No effusion or pneumothorax.  No acute osseous abnormality.  Impression: Mild bibasilar patchy alveolar infiltrates could represent pneumonia.  Mild right upper lobe infiltrate also.  Recommend follow-up.    This report was flagged in Epic as abnormal.    Electronically signed by: Camilo Rebollar  Date:    03/13/2020  Time:    22:20  Echo Color Flow Doppler? Yes  · Normal left ventricular systolic function. The estimated ejection   fraction is 55%.  · No wall motion abnormalities.  · Normal LV diastolic function.  · Mild left atrial enlargement.  · Normal right ventricular systolic function.  · Normal central venous pressure (3 mmHg).           Assessment and Plan:    Active Hospital Problems    Diagnosis  POA    *Suspected Covid-19 Virus Infection [R68.89]  Yes    Acute hypoxemic respiratory failure [J96.01]  No    Hypomagnesemia [E83.42]  No    Osteomyelitis of left foot [M86.9]  Yes    Bacteremia [R78.81]  Yes    Diabetic ketoacidosis without coma associated with type 2 diabetes mellitus [E11.10]  Yes    Diabetic ulcer of left midfoot associated with type 2 diabetes mellitus, with bone involvement without evidence of necrosis [E11.621, L97.426]  Yes    Impaired gait and mobility [R26.89]  Yes    Anemia [D64.9]  Yes    Sepsis with acute organ dysfunction [A41.9, R65.20]  No    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 edema, with long-term current use of insulin [E11.3213, E11.65, Z79.4]  Yes     Chronic    Hypokalemia [E87.6]   No    TAHIR (acute kidney injury) [N17.9]  No    Mixed hyperlipidemia [E78.2]  Yes    Essential hypertension [I10]  Yes     Chronic      Resolved Hospital Problems   No resolved problems to display.       Person under investigation for COVID-19  Acute hypoxemic respiratory failure  DDx aspiration event vs multifocal bacterial pneumonia. Hypoxemia persists. Resp failure also may be ARDS related to underlying sepsis from uncontrolled osteomyelitis.   - COVID-19 testing sent.  - Infection Control notified  - Isolation:   - Airborne and Droplet Precautions  - N95 masks must be fit tested, wear eye protection  - 20 second hand hygiene   - Limit visitors per hospital policy  - Diagnostics (leukopenia, hyponatremia, hyperferritinemia, elevated troponin, elevated d-dimer, age, and comorbidities are significant predictors of poor clinical outcome)   - CBC no leukopenia or lymphonenia   - CMP with hyponatremia   - d-dimer, ferritin, troponin, CRP, LDH, Procalcitonin   - ECG nonishcemic   - rapid Flu neg   - RIP neg   - Legionella antigen   - Blood culture x2   - Portable CXR   - UA and culture neg  - Management:   - Supplemental O2 to maintain SpO2 >92%   - Telemetry & Continuous Pulse Ox   - albuterol INHALER PRN (avoid nebulization of secretions)   - No BiPAP to avoid aerosolization (including home BiPAP)   - No steroids unless septic shock due to increased viral replication   - fluid sparing resuscitation   - Empiric antibiotics per likely source & patient allergies    - HCAP/HAP: zosyn, final day is 3/17, complete    Osteomyelitis of left foot  Podiatry and ID following  - s/p iv zosyn  - restarted cefazolin per ID, pharm D for dosing  - will need OPAT cefazolin once stable    TAHIR  Suspect prerenal vs AIN from zosyn vs. Vanc toxicity  - urinalysis/urine electrolytes consistent with infrarenal dz  - making good urine, suspect improvement  - CMP Q 48 hr, will check tomorrow    DKA in the setting of poorly controlled  T2DM  - SQ insulin  - diabetic diet    CONS positive blood culture  - likely contaminant  - appreciate ID consult      Patient's chronic/stable medical conditions noted in the progress note above will be managed with the patient's home medications as tolerated.       Negro Mayfield MD  [facility]  Pager: [phone]  Spectra: [phone]

## 2020-03-20 NOTE — PLAN OF CARE
Called Cynthia (sister) to discuss patient's current clinical status. Briefly, patient is 50 yo M currently admitted for acute hypoxic resp failure. Patient was tested for COVID-19 on 3/15, results pending. Afebrile, no CBC today. Gradually increasing O2 saturations.     Addressed family's questions/concerns. Family member verbalized understanding of situation and plan.     Erik Evans MD  Medical Resident  [facility]  Pager: [phone]

## 2020-03-20 NOTE — RESPIRATORY THERAPY
Responding to rn call regarding prn tx for pt. Last two bp checks (183/107, 166/100). Albuterol contraindicated in pts w/hypertension. Nurse called to f/u on prn tx, explained that bp was too high and asked for current bp to see if it lowered. She did not have one available since 1229 reading.

## 2020-03-20 NOTE — PLAN OF CARE
Pt AAOx4, VSS, denies pain. Pt currently on 4L 02, 02 sats 91-94%. Covid-19 precautions maintained. Dressing to left foot clean, dry and intact. Will change this evening. Pt ambulatory with boot to left foot. Appetite better than previous days. Will continue to montior.

## 2020-03-21 ENCOUNTER — ANESTHESIA (OUTPATIENT)
Dept: INTENSIVE CARE | Facility: HOSPITAL | Age: 50
DRG: 622 | End: 2020-03-21
Payer: MEDICAID

## 2020-03-21 ENCOUNTER — ANESTHESIA EVENT (OUTPATIENT)
Dept: INTENSIVE CARE | Facility: HOSPITAL | Age: 50
DRG: 622 | End: 2020-03-21
Payer: MEDICAID

## 2020-03-21 LAB
ALBUMIN SERPL BCP-MCNC: 1.5 G/DL (ref 3.5–5.2)
ALBUMIN SERPL BCP-MCNC: 1.6 G/DL (ref 3.5–5.2)
ALP SERPL-CCNC: 120 U/L (ref 55–135)
ALP SERPL-CCNC: 123 U/L (ref 55–135)
ALT SERPL W/O P-5'-P-CCNC: <5 U/L (ref 10–44)
ALT SERPL W/O P-5'-P-CCNC: <5 U/L (ref 10–44)
ANION GAP SERPL CALC-SCNC: 10 MMOL/L (ref 8–16)
ANION GAP SERPL CALC-SCNC: 14 MMOL/L (ref 8–16)
ANION GAP SERPL CALC-SCNC: 14 MMOL/L (ref 8–16)
ANION GAP SERPL CALC-SCNC: 9 MMOL/L (ref 8–16)
AST SERPL-CCNC: 26 U/L (ref 10–40)
AST SERPL-CCNC: 27 U/L (ref 10–40)
BASOPHILS # BLD AUTO: 0.02 K/UL (ref 0–0.2)
BASOPHILS # BLD AUTO: 0.02 K/UL (ref 0–0.2)
BASOPHILS NFR BLD: 0.2 % (ref 0–1.9)
BASOPHILS NFR BLD: 0.2 % (ref 0–1.9)
BILIRUB SERPL-MCNC: 0.2 MG/DL (ref 0.1–1)
BILIRUB SERPL-MCNC: 0.2 MG/DL (ref 0.1–1)
BNP SERPL-MCNC: 231 PG/ML (ref 0–99)
BUN SERPL-MCNC: 20 MG/DL (ref 6–20)
BUN SERPL-MCNC: 20 MG/DL (ref 6–20)
BUN SERPL-MCNC: 21 MG/DL (ref 6–20)
BUN SERPL-MCNC: 23 MG/DL (ref 6–20)
CALCIUM SERPL-MCNC: 7.8 MG/DL (ref 8.7–10.5)
CALCIUM SERPL-MCNC: 7.9 MG/DL (ref 8.7–10.5)
CHLORIDE SERPL-SCNC: 100 MMOL/L (ref 95–110)
CHLORIDE SERPL-SCNC: 102 MMOL/L (ref 95–110)
CHLORIDE SERPL-SCNC: 102 MMOL/L (ref 95–110)
CHLORIDE SERPL-SCNC: 99 MMOL/L (ref 95–110)
CO2 SERPL-SCNC: 26 MMOL/L (ref 23–29)
CO2 SERPL-SCNC: 26 MMOL/L (ref 23–29)
CO2 SERPL-SCNC: 28 MMOL/L (ref 23–29)
CO2 SERPL-SCNC: 29 MMOL/L (ref 23–29)
CREAT SERPL-MCNC: 2.4 MG/DL (ref 0.5–1.4)
CREAT SERPL-MCNC: 2.4 MG/DL (ref 0.5–1.4)
CREAT SERPL-MCNC: 2.6 MG/DL (ref 0.5–1.4)
CREAT SERPL-MCNC: 3 MG/DL (ref 0.5–1.4)
CRP SERPL-MCNC: 119 MG/L (ref 0–8.2)
D DIMER PPP IA.FEU-MCNC: 1.62 MG/L FEU
DIFFERENTIAL METHOD: ABNORMAL
DIFFERENTIAL METHOD: ABNORMAL
EOSINOPHIL # BLD AUTO: 0 K/UL (ref 0–0.5)
EOSINOPHIL # BLD AUTO: 0.1 K/UL (ref 0–0.5)
EOSINOPHIL NFR BLD: 0.4 % (ref 0–8)
EOSINOPHIL NFR BLD: 0.6 % (ref 0–8)
ERYTHROCYTE [DISTWIDTH] IN BLOOD BY AUTOMATED COUNT: 12.7 % (ref 11.5–14.5)
ERYTHROCYTE [DISTWIDTH] IN BLOOD BY AUTOMATED COUNT: 13 % (ref 11.5–14.5)
EST. GFR  (AFRICAN AMERICAN): 26.6 ML/MIN/1.73 M^2
EST. GFR  (AFRICAN AMERICAN): 31.6 ML/MIN/1.73 M^2
EST. GFR  (AFRICAN AMERICAN): 34.8 ML/MIN/1.73 M^2
EST. GFR  (AFRICAN AMERICAN): 34.8 ML/MIN/1.73 M^2
EST. GFR  (NON AFRICAN AMERICAN): 23 ML/MIN/1.73 M^2
EST. GFR  (NON AFRICAN AMERICAN): 27.3 ML/MIN/1.73 M^2
EST. GFR  (NON AFRICAN AMERICAN): 30.1 ML/MIN/1.73 M^2
EST. GFR  (NON AFRICAN AMERICAN): 30.1 ML/MIN/1.73 M^2
FERRITIN SERPL-MCNC: 543 NG/ML (ref 20–300)
GLUCOSE SERPL-MCNC: 103 MG/DL (ref 70–110)
GLUCOSE SERPL-MCNC: 218 MG/DL (ref 70–110)
GLUCOSE SERPL-MCNC: 218 MG/DL (ref 70–110)
GLUCOSE SERPL-MCNC: 244 MG/DL (ref 70–110)
HCT VFR BLD AUTO: 32 % (ref 40–54)
HCT VFR BLD AUTO: 32.6 % (ref 40–54)
HGB BLD-MCNC: 10 G/DL (ref 14–18)
HGB BLD-MCNC: 9.8 G/DL (ref 14–18)
IMM GRANULOCYTES # BLD AUTO: 0.07 K/UL (ref 0–0.04)
IMM GRANULOCYTES # BLD AUTO: 0.13 K/UL (ref 0–0.04)
IMM GRANULOCYTES NFR BLD AUTO: 0.8 % (ref 0–0.5)
IMM GRANULOCYTES NFR BLD AUTO: 1.5 % (ref 0–0.5)
LACTATE SERPL-SCNC: 1 MMOL/L (ref 0.5–2.2)
LDH SERPL L TO P-CCNC: 332 U/L (ref 110–260)
LYMPHOCYTES # BLD AUTO: 0.8 K/UL (ref 1–4.8)
LYMPHOCYTES # BLD AUTO: 1.1 K/UL (ref 1–4.8)
LYMPHOCYTES NFR BLD: 12.7 % (ref 18–48)
LYMPHOCYTES NFR BLD: 9.6 % (ref 18–48)
MAGNESIUM SERPL-MCNC: 2.1 MG/DL (ref 1.6–2.6)
MAGNESIUM SERPL-MCNC: 2.1 MG/DL (ref 1.6–2.6)
MCH RBC QN AUTO: 28.9 PG (ref 27–31)
MCH RBC QN AUTO: 29.2 PG (ref 27–31)
MCHC RBC AUTO-ENTMCNC: 30.6 G/DL (ref 32–36)
MCHC RBC AUTO-ENTMCNC: 30.7 G/DL (ref 32–36)
MCV RBC AUTO: 94 FL (ref 82–98)
MCV RBC AUTO: 95 FL (ref 82–98)
MONOCYTES # BLD AUTO: 0.4 K/UL (ref 0.3–1)
MONOCYTES # BLD AUTO: 0.5 K/UL (ref 0.3–1)
MONOCYTES NFR BLD: 4.2 % (ref 4–15)
MONOCYTES NFR BLD: 5.7 % (ref 4–15)
NEUTROPHILS # BLD AUTO: 6.9 K/UL (ref 1.8–7.7)
NEUTROPHILS # BLD AUTO: 7 K/UL (ref 1.8–7.7)
NEUTROPHILS NFR BLD: 80.8 % (ref 38–73)
NEUTROPHILS NFR BLD: 83.3 % (ref 38–73)
NRBC BLD-RTO: 0 /100 WBC
NRBC BLD-RTO: 0 /100 WBC
PHOSPHATE SERPL-MCNC: 2.4 MG/DL (ref 2.7–4.5)
PHOSPHATE SERPL-MCNC: 2.4 MG/DL (ref 2.7–4.5)
PHOSPHATE SERPL-MCNC: 2.7 MG/DL (ref 2.7–4.5)
PHOSPHATE SERPL-MCNC: 3.7 MG/DL (ref 2.7–4.5)
PLATELET # BLD AUTO: 351 K/UL (ref 150–350)
PLATELET # BLD AUTO: 357 K/UL (ref 150–350)
PMV BLD AUTO: 10.5 FL (ref 9.2–12.9)
PMV BLD AUTO: 10.5 FL (ref 9.2–12.9)
POCT GLUCOSE: 110 MG/DL (ref 70–110)
POCT GLUCOSE: 123 MG/DL (ref 70–110)
POCT GLUCOSE: 132 MG/DL (ref 70–110)
POCT GLUCOSE: 205 MG/DL (ref 70–110)
POCT GLUCOSE: 208 MG/DL (ref 70–110)
POCT GLUCOSE: 253 MG/DL (ref 70–110)
POCT GLUCOSE: 29 MG/DL (ref 70–110)
POTASSIUM SERPL-SCNC: 3.2 MMOL/L (ref 3.5–5.1)
POTASSIUM SERPL-SCNC: 3.4 MMOL/L (ref 3.5–5.1)
POTASSIUM SERPL-SCNC: 3.5 MMOL/L (ref 3.5–5.1)
POTASSIUM SERPL-SCNC: 3.5 MMOL/L (ref 3.5–5.1)
PROCALCITONIN SERPL IA-MCNC: 0.26 NG/ML
PROT SERPL-MCNC: 7.1 G/DL (ref 6–8.4)
PROT SERPL-MCNC: 7.3 G/DL (ref 6–8.4)
RBC # BLD AUTO: 3.39 M/UL (ref 4.6–6.2)
RBC # BLD AUTO: 3.42 M/UL (ref 4.6–6.2)
SODIUM SERPL-SCNC: 137 MMOL/L (ref 136–145)
SODIUM SERPL-SCNC: 138 MMOL/L (ref 136–145)
SODIUM SERPL-SCNC: 142 MMOL/L (ref 136–145)
SODIUM SERPL-SCNC: 142 MMOL/L (ref 136–145)
TRIGL SERPL-MCNC: 129 MG/DL (ref 30–150)
TROPONIN I SERPL DL<=0.01 NG/ML-MCNC: 0.03 NG/ML (ref 0–0.03)
WBC # BLD AUTO: 8.44 K/UL (ref 3.9–12.7)
WBC # BLD AUTO: 8.49 K/UL (ref 3.9–12.7)

## 2020-03-21 PROCEDURE — 83735 ASSAY OF MAGNESIUM: CPT

## 2020-03-21 PROCEDURE — 94761 N-INVAS EAR/PLS OXIMETRY MLT: CPT

## 2020-03-21 PROCEDURE — 20000000 HC ICU ROOM

## 2020-03-21 PROCEDURE — 36620 PR INSERT CATH,ART,PERCUT,SHORTTERM: ICD-10-PCS | Mod: 59,,, | Performed by: ANESTHESIOLOGY

## 2020-03-21 PROCEDURE — 85025 COMPLETE CBC W/AUTO DIFF WBC: CPT

## 2020-03-21 PROCEDURE — 83735 ASSAY OF MAGNESIUM: CPT | Mod: 91

## 2020-03-21 PROCEDURE — 99291 PR CRITICAL CARE, E/M 30-74 MINUTES: ICD-10-PCS | Mod: ,,, | Performed by: EMERGENCY MEDICINE

## 2020-03-21 PROCEDURE — 87205 SMEAR GRAM STAIN: CPT

## 2020-03-21 PROCEDURE — 63600175 PHARM REV CODE 636 W HCPCS: Performed by: INTERNAL MEDICINE

## 2020-03-21 PROCEDURE — 80069 RENAL FUNCTION PANEL: CPT

## 2020-03-21 PROCEDURE — 84478 ASSAY OF TRIGLYCERIDES: CPT

## 2020-03-21 PROCEDURE — 84100 ASSAY OF PHOSPHORUS: CPT | Mod: 91

## 2020-03-21 PROCEDURE — 63600175 PHARM REV CODE 636 W HCPCS: Performed by: STUDENT IN AN ORGANIZED HEALTH CARE EDUCATION/TRAINING PROGRAM

## 2020-03-21 PROCEDURE — 83605 ASSAY OF LACTIC ACID: CPT

## 2020-03-21 PROCEDURE — 99900026 HC AIRWAY MAINTENANCE (STAT)

## 2020-03-21 PROCEDURE — 84100 ASSAY OF PHOSPHORUS: CPT

## 2020-03-21 PROCEDURE — 27000221 HC OXYGEN, UP TO 24 HOURS

## 2020-03-21 PROCEDURE — 25000003 PHARM REV CODE 250: Performed by: STUDENT IN AN ORGANIZED HEALTH CARE EDUCATION/TRAINING PROGRAM

## 2020-03-21 PROCEDURE — S0028 INJECTION, FAMOTIDINE, 20 MG: HCPCS | Performed by: STUDENT IN AN ORGANIZED HEALTH CARE EDUCATION/TRAINING PROGRAM

## 2020-03-21 PROCEDURE — 36620 INSERTION CATHETER ARTERY: CPT | Mod: 59,,, | Performed by: ANESTHESIOLOGY

## 2020-03-21 PROCEDURE — 99291 CRITICAL CARE FIRST HOUR: CPT | Mod: ,,, | Performed by: EMERGENCY MEDICINE

## 2020-03-21 PROCEDURE — 31500 INSERT EMERGENCY AIRWAY: CPT | Mod: ,,, | Performed by: ANESTHESIOLOGY

## 2020-03-21 PROCEDURE — 80053 COMPREHEN METABOLIC PANEL: CPT

## 2020-03-21 PROCEDURE — 25000003 PHARM REV CODE 250: Performed by: INTERNAL MEDICINE

## 2020-03-21 PROCEDURE — 99900035 HC TECH TIME PER 15 MIN (STAT)

## 2020-03-21 PROCEDURE — 94770 HC EXHALED C02 TEST: CPT

## 2020-03-21 PROCEDURE — 83880 ASSAY OF NATRIURETIC PEPTIDE: CPT

## 2020-03-21 PROCEDURE — 31500 AIRWAY MANAGEMENT: ICD-10-PCS | Mod: ,,, | Performed by: ANESTHESIOLOGY

## 2020-03-21 PROCEDURE — 83615 LACTATE (LD) (LDH) ENZYME: CPT

## 2020-03-21 PROCEDURE — 87106 FUNGI IDENTIFICATION YEAST: CPT

## 2020-03-21 PROCEDURE — 86140 C-REACTIVE PROTEIN: CPT

## 2020-03-21 PROCEDURE — 84484 ASSAY OF TROPONIN QUANT: CPT

## 2020-03-21 PROCEDURE — 80053 COMPREHEN METABOLIC PANEL: CPT | Mod: 91

## 2020-03-21 PROCEDURE — 85379 FIBRIN DEGRADATION QUANT: CPT

## 2020-03-21 PROCEDURE — 84145 PROCALCITONIN (PCT): CPT

## 2020-03-21 PROCEDURE — 82955 ASSAY OF G6PD ENZYME: CPT

## 2020-03-21 PROCEDURE — 94003 VENT MGMT INPAT SUBQ DAY: CPT

## 2020-03-21 PROCEDURE — 87040 BLOOD CULTURE FOR BACTERIA: CPT | Mod: 59

## 2020-03-21 PROCEDURE — 87070 CULTURE OTHR SPECIMN AEROBIC: CPT

## 2020-03-21 PROCEDURE — 82728 ASSAY OF FERRITIN: CPT

## 2020-03-21 RX ORDER — METHYLPREDNISOLONE SOD SUCC 125 MG
40 VIAL (EA) INJECTION DAILY
Status: COMPLETED | OUTPATIENT
Start: 2020-03-21 | End: 2020-03-25

## 2020-03-21 RX ORDER — PROPOFOL 10 MG/ML
5 VIAL (ML) INTRAVENOUS CONTINUOUS
Status: DISCONTINUED | OUTPATIENT
Start: 2020-03-21 | End: 2020-03-27

## 2020-03-21 RX ORDER — HYDROXYCHLOROQUINE SULFATE 200 MG/1
400 TABLET, FILM COATED ORAL 2 TIMES DAILY
Status: COMPLETED | OUTPATIENT
Start: 2020-03-21 | End: 2020-03-21

## 2020-03-21 RX ORDER — NOREPINEPHRINE BITARTRATE/D5W 4MG/250ML
0.02 PLASTIC BAG, INJECTION (ML) INTRAVENOUS CONTINUOUS
Status: DISCONTINUED | OUTPATIENT
Start: 2020-03-21 | End: 2020-03-25

## 2020-03-21 RX ORDER — HYDROXYCHLOROQUINE SULFATE 200 MG/1
200 TABLET, FILM COATED ORAL 2 TIMES DAILY
Status: COMPLETED | OUTPATIENT
Start: 2020-03-22 | End: 2020-03-25

## 2020-03-21 RX ORDER — AZITHROMYCIN 250 MG/1
250 TABLET, FILM COATED ORAL DAILY
Status: COMPLETED | OUTPATIENT
Start: 2020-03-21 | End: 2020-03-24

## 2020-03-21 RX ADMIN — Medication 50 MCG/HR: at 12:03

## 2020-03-21 RX ADMIN — PROPOFOL 50 MCG/KG/MIN: 10 INJECTION, EMULSION INTRAVENOUS at 12:03

## 2020-03-21 RX ADMIN — FAMOTIDINE 20 MG: 10 INJECTION INTRAVENOUS at 08:03

## 2020-03-21 RX ADMIN — FAMOTIDINE 20 MG: 10 INJECTION INTRAVENOUS at 01:03

## 2020-03-21 RX ADMIN — Medication 0.1 MCG/KG/MIN: at 09:03

## 2020-03-21 RX ADMIN — PROPOFOL 40 MCG/KG/MIN: 10 INJECTION, EMULSION INTRAVENOUS at 05:03

## 2020-03-21 RX ADMIN — PROPOFOL 40 MCG/KG/MIN: 10 INJECTION, EMULSION INTRAVENOUS at 09:03

## 2020-03-21 RX ADMIN — METHYLPREDNISOLONE SODIUM SUCCINATE 40 MG: 125 INJECTION, POWDER, FOR SOLUTION INTRAMUSCULAR; INTRAVENOUS at 12:03

## 2020-03-21 RX ADMIN — CEFAZOLIN 2 G: 1 INJECTION, POWDER, FOR SOLUTION INTRAMUSCULAR; INTRAVENOUS at 08:03

## 2020-03-21 RX ADMIN — AZITHROMYCIN 250 MG: 250 TABLET, FILM COATED ORAL at 08:03

## 2020-03-21 RX ADMIN — HYDROXYCHLOROQUINE SULFATE 400 MG: 200 TABLET, FILM COATED ORAL at 08:03

## 2020-03-21 RX ADMIN — AZITHROMYCIN MONOHYDRATE 500 MG: 500 INJECTION, POWDER, LYOPHILIZED, FOR SOLUTION INTRAVENOUS at 01:03

## 2020-03-21 RX ADMIN — HEPARIN SODIUM 5000 UNITS: 5000 INJECTION, SOLUTION INTRAVENOUS; SUBCUTANEOUS at 12:03

## 2020-03-21 RX ADMIN — PROPOFOL 50 MCG/KG/MIN: 10 INJECTION, EMULSION INTRAVENOUS at 05:03

## 2020-03-21 RX ADMIN — PROPOFOL 50 MCG/KG/MIN: 10 INJECTION, EMULSION INTRAVENOUS at 09:03

## 2020-03-21 RX ADMIN — PROPOFOL 40 MCG/KG/MIN: 10 INJECTION, EMULSION INTRAVENOUS at 02:03

## 2020-03-21 RX ADMIN — DEXTROSE 250 ML: 10 SOLUTION INTRAVENOUS at 01:03

## 2020-03-21 RX ADMIN — HYDROXYCHLOROQUINE SULFATE 400 MG: 200 TABLET, FILM COATED ORAL at 06:03

## 2020-03-21 RX ADMIN — HEPARIN SODIUM 5000 UNITS: 5000 INJECTION, SOLUTION INTRAVENOUS; SUBCUTANEOUS at 09:03

## 2020-03-21 RX ADMIN — Medication 0.12 MCG/KG/MIN: at 03:03

## 2020-03-21 RX ADMIN — SODIUM CHLORIDE 2 UNITS/HR: 9 INJECTION, SOLUTION INTRAVENOUS at 12:03

## 2020-03-21 RX ADMIN — HEPARIN SODIUM 5000 UNITS: 5000 INJECTION, SOLUTION INTRAVENOUS; SUBCUTANEOUS at 05:03

## 2020-03-21 RX ADMIN — Medication 0.05 MCG/KG/MIN: at 01:03

## 2020-03-21 NOTE — ASSESSMENT & PLAN NOTE
- Uncontrolled - A1C 12  - Insulin gtt for now while intubated. Would imagine glucose will be elevated due to steroid.   - diabetic diet  - Home meds insulin detemir 25U qhs, aspart 10 tidwm, victoza, and metformin  - Transition gtt to SQ when stable.

## 2020-03-21 NOTE — ASSESSMENT & PLAN NOTE
-- Pre-renal vs AIN from zosyn vs. vanc toxicity vs COVID induced nephropathy  -- urinalysis/urine electrolytes consistent with infrarenal disease  -- Making good urine   -- Strict I/O for now and low threshold for Nephrology   -- Renal function panels q8  -- electrolyte replacement  -- Avoid nephrotoxic agents  -- renally dose medications

## 2020-03-21 NOTE — ASSESSMENT & PLAN NOTE
Acute Hypoxic Respiratory Failure    -- DDx including: aspiration event vs multifocal bacterial pneumonia.   -- Hypoxemia may be ARDS related to underlying sepsis from uncontrolled osteomyelitis vs COVID  -- COVID-19 testing sent.  - Infection Control notified  - Isolation: Airborne and Droplet Precautions  - Diagnostics (leukopenia, hyponatremia, hyperferritinemia, elevated troponin, elevated d-dimer, age, and comorbidities are significant predictors of poor clinical outcome)              - CBC no leukopenia or lymphonenia              - CMP with hyponatremia; now hypernatremic              - d-dimer, ferritin, troponin, CRP, LDH, Procalcitonin              - ECG non-ischemic              - rapid Flu neg              - RIP neg              - Legionella antigen              - Blood culture x2 NGTD              - CXR with bibasilar patchy infiltrates on arrival. Repeat s/p tube placement              - UA and culture neg    Vent Mode: A/C  Oxygen Concentration (%):  [100] 100  Resp Rate Total:  [18 br/min-20 br/min] 18 br/min  Vt Set:  [400 mL-500 mL] 400 mL  PEEP/CPAP:  [10 jeW90-83 cmH20] 10 cmH20  Pressure Support:  [0 cmH20] 0 cmH20  Mean Airway Pressure:  [14 flO75-07 cmH20] 14 cmH20    - Plan:              - Intubated, Sedated - Propofol/Fentanyl - wean as tolerated              - Telemetry & Continuous Pulse Ox   - Follow up COVID              - Starting on Methylprednisolone 40mg daily (3/26 stop)              - Start on hydroxychloroquine 5 day course (3/26 stop)               - Empiric antibiotics for CAP/HCAP on arrival with zosyn, final day is 3/17, complete.    - Already on Cefazolin. Added Azithromycin for atypical coverage   - ID following for osteo

## 2020-03-21 NOTE — SUBJECTIVE & OBJECTIVE
Interval History/Significant Events: Patient hyperglycemic and started on insulin     Review of Systems   Unable to perform ROS: Intubated     Objective:     Vital Signs (Most Recent):  Temp: 97.7 °F (36.5 °C) (03/21/20 0900)  Pulse: 86 (03/21/20 1326)  Resp: (!) 25 (03/21/20 1326)  BP: 100/65 (03/21/20 1200)  SpO2: 97 % (03/21/20 1326) Vital Signs (24h Range):  Temp:  [97.4 °F (36.3 °C)-98.2 °F (36.8 °C)] 97.7 °F (36.5 °C)  Pulse:  [] 86  Resp:  [17-39] 25  SpO2:  [86 %-99 %] 97 %  BP: ()/() 100/65  Arterial Line BP: ()/(31-77) 113/56   Weight: 100 kg (220 lb 7.4 oz)  Body mass index is 29.09 kg/m².      Intake/Output Summary (Last 24 hours) at 3/21/2020 1402  Last data filed at 3/21/2020 1303  Gross per 24 hour   Intake 1214.23 ml   Output 1925 ml   Net -710.77 ml       Physical Exam   Constitutional:   Alert without acute distress. Patient exam please see attending attestation.    Cardiovascular: Normal rate.   Pulmonary/Chest:   Increased work of breathing       Vents:  Vent Mode: A/C (03/21/20 1326)  Ventilator Initiated: Yes (03/20/20 2875)  Set Rate: 18 BPM (03/21/20 1326)  Vt Set: 400 mL (03/21/20 1326)  Pressure Support: 0 cmH20 (03/21/20 1326)  PEEP/CPAP: 14 cmH20 (03/21/20 1326)  Oxygen Concentration (%): 60 (03/21/20 1326)  Peak Airway Pressure: 30 cmH2O (03/21/20 1326)  Plateau Pressure: 0 cmH20 (03/21/20 1326)  Total Ve: 9.86 mL (03/21/20 1326)  F/VT Ratio<105 (RSBI): (!) 60.24 (03/21/20 1326)  Lines/Drains/Airways     Central Venous Catheter Line                 Hemodialysis Catheter 03/20/20 1142 1 day    Permacath 03/20/20 1142 1 day    Trialysis (Dialysis) Catheter 03/21/20 0020 right internal jugular less than 1 day          Drain                 NG/OG Tube 03/21/20 0147 orogastric 14 Fr. Center mouth less than 1 day         Urethral Catheter 03/21/20 0147 Straight-tip 16 Fr. less than 1 day          Airway                 Airway - Non-Surgical 03/20/20 2310 Endotracheal  Tube less than 1 day          Arterial Line                 Arterial Line 03/20/20 2359 Left Radial less than 1 day          Peripheral Intravenous Line                 Peripheral IV - Single Lumen 03/14/20 0303 20 G Anterior;Right Forearm 7 days         Peripheral IV - Single Lumen 03/21/20 0144 20 G Right Hand less than 1 day         Peripheral IV - Single Lumen 03/21/20 0145 18 G Right Wrist less than 1 day              Significant Labs:    CBC/Anemia Profile:  Recent Labs   Lab 03/21/20  0124 03/21/20  0458   WBC 8.44 8.49   HGB 9.8* 10.0*   HCT 32.0* 32.6*   * 357*   MCV 94 95   RDW 12.7 13.0   FERRITIN 543*  --         Chemistries:  Recent Labs   Lab 03/21/20  0124 03/21/20  0458     142 138   K 3.5  3.5 3.4*     102 100   CO2 26  26 28   BUN 20  20 21*   CREATININE 2.4*  2.4* 2.6*   CALCIUM 7.9*  7.9* 7.8*   ALBUMIN 1.6*  1.6* 1.6*   PROT 7.1 7.3   BILITOT 0.2 0.2   ALKPHOS 120 123   ALT <5* <5*   AST 27 26   MG 2.1 2.1   PHOS 2.4*  2.4* 2.7       CMP:   Recent Labs   Lab 03/21/20  0124 03/21/20  0458     142 138   K 3.5  3.5 3.4*     102 100   CO2 26  26 28   *  218* 244*   BUN 20  20 21*   CREATININE 2.4*  2.4* 2.6*   CALCIUM 7.9*  7.9* 7.8*   PROT 7.1 7.3   ALBUMIN 1.6*  1.6* 1.6*   BILITOT 0.2 0.2   ALKPHOS 120 123   AST 27 26   ALT <5* <5*   ANIONGAP 14  14 10   EGFRNONAA 30.1*  30.1* 27.3*     All pertinent labs within the past 24 hours have been reviewed.    Significant Imaging:  I have reviewed all pertinent imaging results/findings within the past 24 hours.

## 2020-03-21 NOTE — ASSESSMENT & PLAN NOTE
-- Pre-renal vs AIN from zosyn vs. vanc toxicity vs COVID induced nephropathy  -- urinalysis/urine electrolytes consistent with infrarenal disease  -- Making good urine - Strict I/O for now and low threshold for Nephrology   -- Renal function panels q8  -- electrolyte replacement  -- Avoid nephrotoxic agents  -- renally dose medications

## 2020-03-21 NOTE — CARE UPDATE
Pt intubated with a 8.0 ETT secured at 23 at the teeth. Pt was placed on vent on documented settings. Will continue to monitor.        03/20/20 3648   Patient Assessment/Suction   Respiratory Effort Normal;Unlabored   Expansion/Accessory Muscles/Retractions expansion symmetric;no retractions;no use of accessory muscles   All Lung Fields Breath Sounds Anterior:;Lateral:;coarse;diminished   Rhythm/Pattern, Respiratory assisted mechanically   PRE-TX-O2   O2 Device (Oxygen Therapy) ventilator   Oxygen Concentration (%) 100   SpO2 98 %   Pulse Oximetry Type Continuous   $ Pulse Oximetry - Multiple Charge Pulse Oximetry - Multiple   Pulse 104   Resp 18   BP (!) 87/57   ETCO2   $ ETCO2 Charge Exhaled CO2 Monitoring   $ ETCO2 Usage Currently wearing   ETCO2 (mmHg) 32 mmHg   ETCO2 Device Type Portable Bedside Monitor   Wound Care   $ Wound Care Tech Time 15 min   Area of Concern Cheek;Upper lip;Lower lip;Corner lip   Skin Color/Characteristics without discoloration        Airway - Non-Surgical 03/20/20 2310 Endotracheal Tube   Placement Date/Time: 03/20/20 2310   Present Prior to Hospital Arrival?: No  Method of Intubation: Glidescope  Inserted by: Anesthesia MD  Airway Device: Endotracheal Tube  Airway Device Size: 8.0  Style: Cuffed  Cuff Inflation: Minimal occlusive pres...   Secured at 23 cm   Measured At Teeth   Secured Location Left   Secured by Commercial tube stanley   Bite Block none   Site Condition Cool;Dry   Status Intact;Secured;Patent   Site Assessment Clean;Dry   Respiratory Interventions   Airway/Ventilation Management airway patency maintained   VAP Prevention Bundle HOB elevation maintained;oral care regularly provided   Vent Select   Conventional Vent Y   Intubation? Initial intubation   Ventilator Initiated Yes   $ Ventilator Initial 1   Charged w/in last 24h YES   Preset Conventional Ventilator Settings   Vent ID 1   Vent Type    Ventilation Type VC   Vent Mode A/C   Humidity HME   Set Rate 18 BPM    Vt Set 500 mL   PEEP/CPAP 12 cmH20   Pressure Support 0 cmH20   Waveform RAMP   Peak Flow 60 L/min   Set Inspiratory Pressure 0 cmH20   Insp Time 0 Sec(s)   Plateau Set/Insp. Hold (sec) 0   Insp Rise Time  0 %   Trigger Sensitivity Flow/I-Trigger 3 L/min   P High 0 cm H2O   P Low 0 cm H2O   T High 0 sec   T Low 0 sec   Patient Ventilator Parameters   Resp Rate Total 18 br/min   Peak Airway Pressure 28 cmH2O   Mean Airway Pressure 18 cmH20   Plateau Pressure 0 cmH20   Exhaled Vt 516 mL   Total Ve 9.27 mL   Spont Ve 0 L   I:E Ratio Measured 1:2.70   Conventional Ventilator Alarms   Alarms On Y   Ve High Alarm 20 L/min   Ve Low Alarm 2 L/min   Resp Rate High Alarm 55 br/min   Press High Alarm 55 cmH2O   Apnea Rate 24   Apnea Volume (mL) 350 mL   Apnea Oxygen Concentration  100   Apnea Flow Rate (L/min) 60   T Apnea 20 sec(s)   Ready to Wean/Extubation Screen   FIO2<=50 (chart decimal) (!) 1   MV<16L (chart vol.) 9.27   PEEP <=8 (chart #) (!) 12   Ready to Wean Parameters   F/VT Ratio<105 (RSBI) (!) 34.88   Airway Safety   Ambu bag with the patient? Yes, Adult Ambu   Is mask with the patient? Yes, Adult Mask   Suction set is at the bedside? Yes

## 2020-03-21 NOTE — ASSESSMENT & PLAN NOTE
-- Podiatry and ID following  -- s/p iv zosyn. Stopped Zosyn   -- On cefazolin per ID (MSSA), pharm D for dosing  -- will need outpatient cefazolin once stable

## 2020-03-21 NOTE — CONSULTS
Consult received for TF recs:    Pt seen by RD 3/19, full assessment completed remotely. Please see note for further details.    Recommendations:  If TF warranted, suggest Glucerna 1.5 @ 10 mL/hr increase to goal rate of 65 mL/hr to provide pt with 2340 kcal, 128 g protein and 1184 mL free water. Additional water per MD. Hold for residuals >500 mL.    If able to continue diet, suggest DM diet + boost glucose BID to increase intake.    RD to monitor and follow up.     Thanks,  Roberta Han RD, LDN

## 2020-03-21 NOTE — ANESTHESIA PROCEDURE NOTES
Central Line    Diagnosis: respiratory failure  Patient location during procedure: ICU  Procedure start time: 3/20/2020 11:42 AM  Timeout: 3/20/2020 11:42 AM  Procedure end time: 3/21/2020 12:12 AM    Staffing  Authorizing Provider: Ela Puala MD  Performing Provider: Ela Paula MD    Staffing  Anesthesiologist: Ela Paula MD  Performed: anesthesiologist   Anesthesiologist was present at the time of the procedure.  Preanesthetic Checklist  Completed: patient identified, site marked, surgical consent, pre-op evaluation, timeout performed, IV checked, risks and benefits discussed, monitors and equipment checked and anesthesia consent given  Indication   Indication: hemodynamic monitoring, vascular access, hemodialysis     Anesthesia   local infiltration    Central Line   Skin Prep: skin prepped with ChloraPrep, skin prep agent completely dried prior to procedure  maximum sterile barriers used during central venous catheter insertion  hand hygiene performed prior to central venous catheter insertion  Location: right, internal jugular.   Catheter type: dialysis  Ultrasound: vascular probe with ultrasound  Vessel Caliber: large, patent, compressibility normal  Needle advanced into vessel with real time Ultrasound guidance.   Manometry: Venous cannualation confirmed by visual estimation of blood vessel pressure using manometry.  Insertion Attempts: 1   Securement:line sutured    Post-Procedure   Adverse Events:none    Guidewire Guidewire removed intact.

## 2020-03-21 NOTE — CARE UPDATE
Called Ms Cynthia White and Ms Vic, gave update about Mr Ryder's condition. Notified that patient transfer to ICU due to respiratory distress possibly from COVID-19. COVID test result did not return. Family notified that the patient is critically ill and our team is our best to treat him. Family understands the critical situation. Patient's family told not to come to visit since our hospital does not allow visitors unless patient is under comfort care.   Family would like to get updated with daily report.     YANNICK Lima M.D.  Critical Care Medicine PGY-2  [phone]

## 2020-03-21 NOTE — ASSESSMENT & PLAN NOTE
Acute Hypoxic Respiratory Failure    DDx including:  -- aspiration event vs multifocal bacterial pneumonia.   -- Hypoxemia may be ARDS related to underlying sepsis from uncontrolled osteomyelitis.   -- COVID-19 testing sent.  - Infection Control notified  - Isolation: Airborne and Droplet Precautions  - Diagnostics (leukopenia, hyponatremia, hyperferritinemia, elevated troponin, elevated d-dimer, age, and comorbidities are significant predictors of poor clinical outcome)              - CBC no leukopenia or lymphonenia              - CMP with hyponatremia; now hypernatremic              - d-dimer, ferritin, troponin, CRP, LDH, Procalcitonin              - ECG non-ischemic              - rapid Flu neg              - RIP neg              - Legionella antigen              - Blood culture x2 NGTD              - CXR with bibasilar patchy infiltrates on arrival. Repeat s/p tube placement              - UA and culture neg  - Management:              - Intubated, Sedated - Propofol/Fentanyl - wean as tolerated              - Telemetry & Continuous Pulse Ox   - Follow up COVID              - No steroids unless septic shock due to increased viral replication              - Empiric antibiotics for CAP/HCAP on arrival with zosyn, final day is 3/17, complete.    - Already on Cefazolin. Added Azithromycin for atypical coverage   - ID following for osteo

## 2020-03-21 NOTE — PROGRESS NOTES
"[facility]  Critical Care Medicine  Progress Note    Patient Name: Indio Ryder Jr.  MRN: [id number]  Admission Date: 3/10/2020  Hospital Length of Stay: 11 days  Code Status: Full Code  Attending Provider: Jw Stanley MD  Primary Care Provider: Lorena Thakur MD   Principal Problem: Suspected Covid-19 Virus Infection    Subjective:     HPI:  Per  HPI    "Mr. Ryder is 50 y/o with a history of poorly controlled DM2 ( last Ha1c 8.4 ) , DKA with coma, diabetic neuropathy, diabatic retinopathy and diabetic foot with multiple amputation who presents to ED with chief complaint of chills associated with nausea and vomiting for one day. He sates that yesterday he started to feel ill and he has been having chills associated with nauseas and vomiting. He also endorses poor appetite with decreased oral intake for the last week. Denies fever, abdominal pain, feet or leg pain, leg swelling, CP, SOB, dysuria. He a history of poorly control diabetes with amputation of his right big and 5th toes. He undergone left big toe detriment and imputation on 7/14/2019. He reports not feeling well for the last couple of weeks and he felt about to pass out on [place]ardi gras and his BP was in 90/50. He was seen by podiatry last  Thursday when he had his wound opened and he was prescribed doxycycline for 10 days.      In ED, he was afebrile, vitally stable. Lab, BS: 382, B-hydroxybutyrate 3.6, HCO3 22,  anion gap 18. WBC: 6.9, lactate: 1.2  X-ray of right food shows soft tissue edema concerning for osteomyelitis. He received single dose of vanc/zosyn and 2L IVF.     Overview/Hospital Course:  Admitted to . Started on insulin gtt and IVF infusion. Pt is currently treated with doxycyline for wound in left foot. ID and podiatry consulted. MRI with acute osteomyelitis of 4th metatarsal head and neck of left proximal phalanx. ID recommended cefazolin and podiatry plan for for amputation. Pt is refusing amputation but " "agree to have agressive washout if he keep spiking fever. Pt transitioned to subQ insulin and tolerating PO. Blood ux grwe Staph coagulase negative, TTE done and shows normal valves with no vegetations. Pt had drop in his SpO2 to 85 with need for 5L oxygen. Respiratory cx with many GPC and CXR shows b/l patchy infiltration. Pt still spiking fever and in acute respiratory failure. Concern for COVID-19, test is sent. "     Rapid response called on pt as oxygen requirements are slowly increasing. Pt with oxygen saturations of 88% on 8L NC. Pt transferred to CMICU and intubated the unit.     Hospital/ICU Course:  No notes on file    Interval History/Significant Events: Patient hyperglycemic and started on insulin     Review of Systems   Unable to perform ROS: Intubated     Objective:     Vital Signs (Most Recent):  Temp: 97.7 °F (36.5 °C) (03/21/20 0900)  Pulse: 86 (03/21/20 1326)  Resp: (!) 25 (03/21/20 1326)  BP: 100/65 (03/21/20 1200)  SpO2: 97 % (03/21/20 1326) Vital Signs (24h Range):  Temp:  [97.4 °F (36.3 °C)-98.2 °F (36.8 °C)] 97.7 °F (36.5 °C)  Pulse:  [] 86  Resp:  [17-39] 25  SpO2:  [86 %-99 %] 97 %  BP: ()/() 100/65  Arterial Line BP: ()/(31-77) 113/56   Weight: 100 kg (220 lb 7.4 oz)  Body mass index is 29.09 kg/m².      Intake/Output Summary (Last 24 hours) at 3/21/2020 1402  Last data filed at 3/21/2020 1303  Gross per 24 hour   Intake 1214.23 ml   Output 1925 ml   Net -710.77 ml       Physical Exam   Constitutional:   Alert without acute distress. Patient exam please see attending attestation.    Cardiovascular: Normal rate.   Pulmonary/Chest:   Increased work of breathing       Vents:  Vent Mode: A/C (03/21/20 1326)  Ventilator Initiated: Yes (03/20/20 9308)  Set Rate: 18 BPM (03/21/20 1326)  Vt Set: 400 mL (03/21/20 1326)  Pressure Support: 0 cmH20 (03/21/20 1326)  PEEP/CPAP: 14 cmH20 (03/21/20 1326)  Oxygen Concentration (%): 60 (03/21/20 1326)  Peak Airway Pressure: 30 cmH2O " (03/21/20 1326)  Plateau Pressure: 0 cmH20 (03/21/20 1326)  Total Ve: 9.86 mL (03/21/20 1326)  F/VT Ratio<105 (RSBI): (!) 60.24 (03/21/20 1326)  Lines/Drains/Airways     Central Venous Catheter Line                 Hemodialysis Catheter 03/20/20 1142 1 day    Permacath 03/20/20 1142 1 day    Trialysis (Dialysis) Catheter 03/21/20 0020 right internal jugular less than 1 day          Drain                 NG/OG Tube 03/21/20 0147 orogastric 14 Fr. Center mouth less than 1 day         Urethral Catheter 03/21/20 0147 Straight-tip 16 Fr. less than 1 day          Airway                 Airway - Non-Surgical 03/20/20 2310 Endotracheal Tube less than 1 day          Arterial Line                 Arterial Line 03/20/20 2359 Left Radial less than 1 day          Peripheral Intravenous Line                 Peripheral IV - Single Lumen 03/14/20 0303 20 G Anterior;Right Forearm 7 days         Peripheral IV - Single Lumen 03/21/20 0144 20 G Right Hand less than 1 day         Peripheral IV - Single Lumen 03/21/20 0145 18 G Right Wrist less than 1 day              Significant Labs:    CBC/Anemia Profile:  Recent Labs   Lab 03/21/20  0124 03/21/20  0458   WBC 8.44 8.49   HGB 9.8* 10.0*   HCT 32.0* 32.6*   * 357*   MCV 94 95   RDW 12.7 13.0   FERRITIN 543*  --         Chemistries:  Recent Labs   Lab 03/21/20  0124 03/21/20  0458     142 138   K 3.5  3.5 3.4*     102 100   CO2 26  26 28   BUN 20 20 21*   CREATININE 2.4*  2.4* 2.6*   CALCIUM 7.9*  7.9* 7.8*   ALBUMIN 1.6*  1.6* 1.6*   PROT 7.1 7.3   BILITOT 0.2 0.2   ALKPHOS 120 123   ALT <5* <5*   AST 27 26   MG 2.1 2.1   PHOS 2.4*  2.4* 2.7       CMP:   Recent Labs   Lab 03/21/20  0124 03/21/20  0458     142 138   K 3.5  3.5 3.4*     102 100   CO2 26  26 28   *  218* 244*   BUN 20  20 21*   CREATININE 2.4*  2.4* 2.6*   CALCIUM 7.9*  7.9* 7.8*   PROT 7.1 7.3   ALBUMIN 1.6*  1.6* 1.6*   BILITOT 0.2 0.2   ALKPHOS 120 123   AST 27 26    ALT <5* <5*   ANIONGAP 14  14 10   EGFRNONAA 30.1*  30.1* 27.3*     All pertinent labs within the past 24 hours have been reviewed.    Significant Imaging:  I have reviewed all pertinent imaging results/findings within the past 24 hours.      ABG  No results for input(s): PH, PO2, PCO2, HCO3, BE in the last 168 hours.  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 Would imagine glucose will be elevated due to steroid.   - diabetic diet  - Home meds insulin detemir 25U qhs, aspart 10 tidwm, victoza, and metformin  - Transition gtt to SQ when stable.     Cardiac/Vascular  Mixed hyperlipidemia  - Continue statin    Renal/  TAHIR (acute kidney injury)  -- Pre-renal vs AIN from zosyn vs. vanc toxicity vs COVID induced nephropathy  -- urinalysis/urine electrolytes consistent with infrarenal disease  -- Making good urine   -- Strict I/O for now and low threshold for Nephrology   -- Renal function panels q8  -- electrolyte replacement  -- Avoid nephrotoxic agents  -- renally dose medications    ID  Osteomyelitis of left foot  -- Podiatry and ID following  -- s/p iv zosyn. Stopped Zosyn   -- On cefazolin per ID (MSSA), pharm D for dosing  -- will need outpatient cefazolin once stable    Other  * Suspected Covid-19 Virus Infection  Acute Hypoxic Respiratory Failure    -- DDx including: aspiration event vs multifocal bacterial pneumonia.   -- Hypoxemia may be ARDS related to underlying sepsis from uncontrolled osteomyelitis vs COVID  -- COVID-19 testing sent.  - Infection Control notified  - Isolation: Airborne and Droplet Precautions  - Diagnostics (leukopenia, hyponatremia, hyperferritinemia, elevated troponin, elevated d-dimer, age, and comorbidities are significant predictors of poor clinical outcome)              -  CBC no leukopenia or lymphonenia              - CMP with hyponatremia; now hypernatremic              - d-dimer, ferritin, troponin, CRP, LDH, Procalcitonin              - ECG non-ischemic              - rapid Flu neg              - RIP neg              - Legionella antigen              - Blood culture x2 NGTD              - CXR with bibasilar patchy infiltrates on arrival. Repeat s/p tube placement              - UA and culture neg    Vent Mode: A/C  Oxygen Concentration (%):  [100] 100  Resp Rate Total:  [18 br/min-20 br/min] 18 br/min  Vt Set:  [400 mL-500 mL] 400 mL  PEEP/CPAP:  [10 [id number] cmH20] 10 cmH20  Pressure Support:  [0 cmH20] 0 cmH20  Mean Airway Pressure:  [14 [id number] cmH20] 14 cmH20    - Plan:              - Intubated, Sedated - Propofol/Fentanyl - wean as tolerated              - Telemetry & Continuous Pulse Ox   - Follow up COVID              -  Starting on Methylprednisolone 40mg daily (3/26 stop)              - Start on hydroxychloroquine 5 day course (3/26 stop)               - Empiric antibiotics for CAP/HCAP on arrival with zosyn, final day is 3/17, complete.    - Already on Cefazolin. Added Azithromycin total of 1500mg to work with hydroxychloroquine.    - ID following for osteo       Critical Care Daily Checklist:    A: Awake: RASS Goal/Actual Goal:    Actual: Deshpande Agitation Sedation Scale (RASS): Unarousable   B: Spontaneous Breathing Trial Performed?     C: SAT & SBT Coordinated?  No                     D: Delirium: CAM-ICU Overall CAM-ICU: Positive   E: Early Mobility Performed? No   F: Feeding Goal: Goals: Meet % EEN, EPN by RD f/u date  Status: Nutrition Goal Status: new   Current Diet Order   Procedures    Diet NPO      AS: Analgesia/Sedation Fentanyl and propofol   T: Thromboembolic Prophylaxis Hepaprin   H: HOB > 300 Yes   U: Stress Ulcer Prophylaxis (if needed) Famotidine    G: Glucose Control Insulin gtt   B: Bowel Function Stool Occurrence: 1   I: Indwelling Catheter  (Lines & Beck) Necessity Central A-line and Beck    D: De-escalation of Antimicrobials/Pharmacotherapies Cefazolin, Azithromycin    Plan for the day/ETD Wean off vent    Code Status:  Family/Goals of Care: Full Code         Critical secondary to Patient has a condition that poses threat to life and bodily function: Severe Respiratory Distress      Critical care was time spent personally by me on the following activities: development of treatment plan with patient or surrogate and bedside caregivers, discussions with consultants, evaluation of patient's response to treatment, examination of patient, ordering and performing treatments and interventions, ordering and review of laboratory studies, ordering and review of radiographic studies, pulse oximetry, re-evaluation of patient's condition. This critical care time did not overlap with that of any other provider or involve time for any procedures.     YANNICK Lima MD  Critical Care Medicine  [facility]

## 2020-03-21 NOTE — HPI
"Per  HPI    "Mr. Ryder is 50 y/o with a history of poorly controlled DM2 ( last Ha1c 8.4 ) , DKA with coma, diabetic neuropathy, diabatic retinopathy and diabetic foot with multiple amputation who presents to ED with chief complaint of chills associated with nausea and vomiting for one day. He sates that yesterday he started to feel ill and he has been having chills associated with nauseas and vomiting. He also endorses poor appetite with decreased oral intake for the last week. Denies fever, abdominal pain, feet or leg pain, leg swelling, CP, SOB, dysuria. He a history of poorly control diabetes with amputation of his right big and 5th toes. He undergone left big toe detriment and imputation on 7/14/2019. He reports not feeling well for the last couple of weeks and he felt about to pass out on [place] and his BP was in 90/50. He was seen by podiatry last  Thursday when he had his wound opened and he was prescribed doxycycline for 10 days.      In ED, he was afebrile, vitally stable. Lab, BS: 382, B-hydroxybutyrate 3.6, HCO3 22,  anion gap 18. WBC: 6.9, lactate: 1.2  X-ray of right food shows soft tissue edema concerning for osteomyelitis. He received single dose of vanc/zosyn and 2L IVF.     Overview/Hospital Course:  Admitted to . Started on insulin gtt and IVF infusion. Pt is currently treated with doxycyline for wound in left foot. ID and podiatry consulted. MRI with acute osteomyelitis of 4th metatarsal head and neck of left proximal phalanx. ID recommended cefazolin and podiatry plan for for amputation. Pt is refusing amputation but agree to have agressive washout if he keep spiking fever. Pt transitioned to subQ insulin and tolerating PO. Blood ux grwe Staph coagulase negative, TTE done and shows normal valves with no vegetations. Pt had drop in his SpO2 to 85 with need for 5L oxygen. Respiratory cx with many GPC and CXR shows b/l patchy infiltration. Pt still spiking fever and in acute respiratory " "failure. Concern for COVID-19, test is sent. "     Rapid response called on pt as oxygen requirements are slowly increasing. Pt with oxygen saturations of 88% on 8L NC. Pt transferred to CMICU and intubated the unit.   "

## 2020-03-21 NOTE — ANESTHESIA PROCEDURE NOTES
Ad Hoc Intubation  Performed by: Ela Paula MD  Authorized by: Ela Paula MD     Indications:  Respiratory distress  Diagnosis:  Covid PUI  Patient Location:  ICU  Timeout:  3/21/2020 12:50 AM  Procedure Start Time:  3/21/2020 12:50 AM  Procedure End Time:  3/21/2020 12:50 AM  Staff:     patient identified, IV checked, site marked, risks and benefits discussed, surgical consent, monitors and equipment checked, pre-op evaluation, timeout performed and anesthesia consent      Anesthesiologist Present: Yes    Intubation:     Induction:  Rapid sequence induction    Intubated:  Postinduction    Mask Ventilation:  N/a    Attempts:  1    Attempted By:  Staff anesthesiologist    Method of Intubation:  Video laryngoscopy    Blade:  Glidescope 3    Laryngeal View Grade: Grade I - full view of chords      Difficult Airway Encountered?: No      Airway Device:  Oral endotracheal tube    Airway Device Size:  8.0    Style/Cuff Inflation:  Cuffed    Tube secured:  22    Secured at:  The teeth    Placement Verified By:  Capnometry    Complicating Factors:  None    Findings Post-Intubation:  BS equal bilateral

## 2020-03-21 NOTE — CODE/ RAPID DOCUMENTATION
"RAPID RESPONSE NURSE NOTE     Admit Date: 3/10/2020  LOS: 10  Code Status: Full Code   Date of Consult: 2020  : 1970  Age: 49 y.o.  Weight:   Wt Readings from Last 1 Encounters:   20 100 kg (220 lb 7.4 oz)     Sex: male  Race: Black or    Bed: [address] [place] [address] A:   MRN: [id number]  Time Rapid Response Team page Received:   Time Rapid Response Team at Bedside:   Time Rapid Response Team left Bedside: 0  Was the patient discharged from an ICU this admission?   no  Was the patient discharged from a PACU within last 24 hours?  no  Did the patient receive conscious sedation/general anesthesia within last 24 hours?  no  Was the patient in the ED within the past 24 hours?  no  Was the patient started on NIPPV within the past 24 hours?  no  Did this progress into an ARC or CPA:  no  Attending Physician: Negro Mayfield, *  Primary Service: [facility] MED O  Consult Requested By: Negro Mayfield, *     SITUATION     Notified by bedside RN via phone call.  Reason for alert: Decreased oxygenation  Called to evaluate the patient for Respiratory    BACKGROUND     Why is the patient in the hospital?: Suspected Covid-19 Virus Infection    Patient has a past medical history of Actinomyces infection, Diabetic ketoacidosis without coma associated with type 2 diabetes mellitus, Diabetic ulcer of right foot associated with type 2 diabetes mellitus, Essential hypertension, Group B streptococcal infection, Mixed hyperlipidemia, Shoulder impingement, Subacute osteomyelitis of right foot, Traumatic amputation of fifth toe of right foot, Type 2 diabetes mellitus with diabetic neuropathy, with long-term current use of insulin, and Ulcerative colitis, unspecified.    ASSESSMENT/INTERVENTIONS     BP (!) 160/97 (BP Location: Right arm, Patient Position: Lying)   Pulse 108   Temp 98.2 °F (36.8 °C) (Oral)   Resp (!) 27   Ht 6' 1" (1.854 m)   Wt 100 kg (220 lb 7.4 oz)   SpO2 (!) 90%   BMI " 29.09 kg/m²     What did you find: RRN called to bedside by KINZA Seo to assess pts increased oxygen needs. Pt on 4L NC at start of shift O2 sats sustaining low 90's, now requiring 7L NC O2 sat's sustaining mid to high 80's. Upon arrival to room pt lying in bed, shallow breaths, resp rate 23-27 bpm, O2 sat's 89% on 7L, and coarse diminished breath sounds bilaterally. Pt denies any feelings of SOB while at rest, but states an increase in SOB while ambulating. Afebrile with a temp of 97.1 orally. CXR obtained on 3/13/2020. Crtical Care notified. Orders to transfer pt to CMICU.     RECOMMENDATIONS     We recommend: Transfer to ICU.    FOLLOW-UP/CONTINGENCY PLAN     Call the Rapid Response Nurse, Corin Karimi, RN at x [phone] for additional questions or concerns.    PHYSICIAN ESCALATION     Orders received and case discussed with Dr. Anne Maldonado.    Disposition: Tx in ICU bed 6089.

## 2020-03-21 NOTE — ASSESSMENT & PLAN NOTE
- Uncontrolled - A1C 12  - Insulin gtt for now while intubated  - diabetic diet  - Home meds insulin detemir 25U qhs, aspart 10 tidwm, victoza, and metformin

## 2020-03-21 NOTE — ANESTHESIA PROCEDURE NOTES
Arterial    Diagnosis: covid PUI    Patient location during procedure: ICU  Procedure start time: 3/21/2020 12:46 AM  Timeout: 3/21/2020 12:46 AM  Procedure end time: 3/21/2020 12:46 AM    Staffing  Authorizing Provider: Ela Paula MD  Performing Provider: Ela Paula MD    Anesthesiologist was present at the time of the procedure.    Preanesthetic Checklist  Completed: patient identified, site marked, surgical consent, pre-op evaluation, timeout performed, IV checked, risks and benefits discussed, monitors and equipment checked and anesthesia consent givenArterial  Skin Prep: chlorhexidine gluconate  Local Infiltration: none  Orientation: left  Location: radial  Catheter Size: 20 G  Catheter placement by Ultrasound guidance. Heme positive aspiration all ports.  Vessel Caliber: medium, patent  Needle advanced into vessel with real time Ultrasound guidance.Insertion Attempts: 1  Assessment  Dressing: sutured in place and taped and tegaderm

## 2020-03-21 NOTE — EICU
Rounding (Video Assessment):  Yes    Intervention Initiated From:  Bedside    Maryam Communicated with Bedside Nurse regarding:  Time-Out        Comments: elert for time out for intubation, art line, and central line per Dr.K Paula. Pt given  200 mg of Diprivan and 100 mg of Rocuronium. Dr. RAVINDER Paula placed an   8.0 ett and it is 22 at lip see time out flowsheet. .  23: mcg of Tian given for map less than 65.

## 2020-03-21 NOTE — NURSING TRANSFER
Nursing Transfer Note      3/20/2020     Transfer To: 6089 from 8092    Transfer via bed    Transfer with O2, cardiac monitoring    Transported by Rapid Response RNs and bedside RN Bety    Medicines sent: levemir pen via tube system.    Chart send with patient: Yes    Notified: Dr. Cheko peralta MD of [facility] med team O    Patient reassessed at:3/20 by RN and critical care team when arrived to room    Upon arrival to floor: cardiac monitor applied, pt oriented to room.  Hand off report completed at bedside.    Pt admitted for COVID-19 rule out,  test results pending. PMHx DMII, neuropathy with chronic dm ulcer.  Pt denying SOB/MYRICK. LS diminished bilaterally throughout. RR increased from 17 to 24-26, increased oxygen needs from 4-7 with max SPO2 of 88-89%.  Rapid Response RN called to assess pt. Decision made to transfer to ICU.

## 2020-03-21 NOTE — SUBJECTIVE & OBJECTIVE
Past Medical History:   Diagnosis Date    Actinomyces infection 1/17/2017    Right foot    Diabetic ketoacidosis without coma associated with type 2 diabetes mellitus 5/30/2017    Diabetic ulcer of right foot associated with type 2 diabetes mellitus 6/3/2015    Essential hypertension 6/6/2013    Group B streptococcal infection 1/13/2017    Mixed hyperlipidemia 8/12/2014    Shoulder impingement 8/12/2014    Subacute osteomyelitis of right foot 1/12/2017    Streptococcus agalactiae, Actinomyces odontolyticus    Traumatic amputation of fifth toe of right foot 07/02/2015    Type 2 diabetes mellitus with diabetic neuropathy, with long-term current use of insulin 5/3/2016    Ulcerative colitis, unspecified        Past Surgical History:   Procedure Laterality Date    DEBRIDEMENT OF FOOT Left 7/14/2019    Procedure: DEBRIDEMENT, FOOT, with left 5th ray amputation;  Surgeon: Sis Hickman DPM;  Location: [facility] OR [address] [place] [address];  Service: Podiatry;  Laterality: Left;    DEBRIDEMENT OF FOOT Left 7/17/2019    Procedure: DEBRIDEMENT, FOOT with leftt 5th ray partial amputation with Neox Graft;  Surgeon: Mai Burrell DPM;  Location: [facility] OR [address] [place] [address];  Service: Podiatry;  Laterality: Left;  t    TOE AMPUTATION Right 06/05/2015    TOE AMPUTATION Right 01/19/2017       Review of patient's allergies indicates:  No Known Allergies    Family History     Problem Relation (Age of Onset)    Cancer Mother    Cataracts Father    Diabetes Mother, Sister    Hypertension Mother, Father, Maternal Aunt    No Known Problems Brother, Sister, Sister, Maternal Uncle, Paternal Aunt, Paternal Uncle, Maternal Grandmother, Maternal Grandfather, Paternal Grandmother, Paternal Grandfather        Tobacco Use    Smoking status: Never Smoker    Smokeless tobacco: Never Used   Substance and Sexual Activity    Alcohol use: No    Drug use: No    Sexual activity: Yes     Partners: Female     Birth control/protection: Condom      Review  of Systems    COVID r/o  Objective:     Vital Signs (Most Recent):  Temp: 97.8 °F (36.6 °C) (03/20/20 2152)  Pulse: 102 (03/20/20 2209)  Resp: (!) 26 (03/20/20 2209)  BP: (!) 175/107 (03/20/20 2152)  SpO2: (!) 88 % (03/20/20 2211) Vital Signs (24h Range):  Temp:  [97.4 °F (36.3 °C)-97.8 °F (36.6 °C)] 97.8 °F (36.6 °C)  Pulse:  [] 102  Resp:  [17-26] 26  SpO2:  [86 %-91 %] 88 %  BP: (158-183)/() 175/107   Weight: 56.4 kg (124 lb 5.4 oz)  Body mass index is 25.11 kg/m².      Intake/Output Summary (Last 24 hours) at 3/20/2020 2229  Last data filed at 3/20/2020 2006  Gross per 24 hour   Intake 400 ml   Output 3700 ml   Net -3300 ml       Physical Exam   Constitutional:   Alert without acute distress   Cardiovascular: Normal rate.   Pulmonary/Chest:   Increased work of breathing    COVID r/o    Vents:  Oxygen Concentration (%): 36 (03/15/20 2100)  Lines/Drains/Airways     Peripheral Intravenous Line                 Peripheral IV - Single Lumen 03/14/20 0303 20 G Anterior;Right Forearm 6 days              Significant Labs:    CBC/Anemia Profile:  No results for input(s): WBC, HGB, HCT, PLT, MCV, RDW, IRON, FERRITIN, RETIC, FOLATE, [id number], OCCULTBLOOD in the last 48 hours.     Chemistries:  Recent Labs   Lab 03/19/20  0635   *   K 3.9      CO2 26   BUN 22*   CREATININE 3.6*   CALCIUM 8.2*   ALBUMIN 1.7*   PROT 7.5   BILITOT 0.2   ALKPHOS 125   ALT 7*   AST 39   PHOS 3.9       All pertinent labs within the past 24 hours have been reviewed.    Significant Imaging: I have reviewed and interpreted all pertinent imaging results/findings within the past 24 hours.

## 2020-03-21 NOTE — ASSESSMENT & PLAN NOTE
-- Podiatry and ID following  -- s/p iv zosyn  -- [facility] pharm D for dosing  -- will need outpatient cefazolin once stable

## 2020-03-21 NOTE — H&P
"[facility]  Critical Care Medicine  History & Physical    Patient Name: Indio Ryder Jr.  MRN: [id number]  Admission Date: 3/10/2020  Hospital Length of Stay: 10 days  Code Status: Full Code  Attending Physician: Negro Mayfield, *   Primary Care Provider: Lorena Thakur MD   Principal Problem: Suspected Covid-19 Virus Infection    Subjective:     HPI:  Per  HPI    "Mr. Ryder is 48 y/o with a history of poorly controlled DM2 ( last Ha1c 8.4 ) , DKA with coma, diabetic neuropathy, diabatic retinopathy and diabetic foot with multiple amputation who presents to ED with chief complaint of chills associated with nausea and vomiting for one day. He sates that yesterday he started to feel ill and he has been having chills associated with nauseas and vomiting. He also endorses poor appetite with decreased oral intake for the last week. Denies fever, abdominal pain, feet or leg pain, leg swelling, CP, SOB, dysuria. He a history of poorly control diabetes with amputation of his right big and 5th toes. He undergone left big toe detriment and imputation on 7/14/2019. He reports not feeling well for the last couple of weeks and he felt about to pass out on Mardi gras and his BP was in 90/50. He was seen by podiatry last  Thursday when he had his wound opened and he was prescribed doxycycline for 10 days.      In ED, he was afebrile, vitally stable. Lab, BS: 382, B-hydroxybutyrate 3.6, HCO3 22,  anion gap 18. WBC: 6.9, lactate: 1.2  X-ray of right food shows soft tissue edema concerning for osteomyelitis. He received single dose of vanc/zosyn and 2L IVF.     Overview/Hospital Course:  Admitted to . Started on insulin gtt and IVF infusion. Pt is currently treated with doxycyline for wound in left foot. ID and podiatry consulted. MRI with acute osteomyelitis of 4th metatarsal head and neck of left proximal phalanx. ID recommended cefazolin and podiatry plan for for amputation. Pt is refusing " "amputation but agree to have agressive washout if he keep spiking fever. Pt transitioned to subQ insulin and tolerating PO. Blood ux grwe Staph coagulase negative, TTE done and shows normal valves with no vegetations. Pt had drop in his SpO2 to 85 with need for 5L oxygen. Respiratory cx with many GPC and CXR shows b/l patchy infiltration. Pt still spiking fever and in acute respiratory failure. Concern for COVID-19, test is sent. "     Rapid response called on pt as oxygen requirements are slowly increasing. Pt with oxygen saturations of 88% on 8L NC. Pt transferred to CMICU and intubated the unit.      Past Medical History:   Diagnosis Date    Actinomyces infection 1/17/2017    Right foot    Diabetic ketoacidosis without coma associated with type 2 diabetes mellitus 5/30/2017    Diabetic ulcer of right foot associated with type 2 diabetes mellitus 6/3/2015    Essential hypertension 6/6/2013    Group B streptococcal infection 1/13/2017    Mixed hyperlipidemia 8/12/2014    Shoulder impingement 8/12/2014    Subacute osteomyelitis of right foot 1/12/2017    Streptococcus agalactiae, Actinomyces odontolyticus    Traumatic amputation of fifth toe of right foot 07/02/2015    Type 2 diabetes mellitus with diabetic neuropathy, with long-term current use of insulin 5/3/2016    Ulcerative colitis, unspecified        Past Surgical History:   Procedure Laterality Date    DEBRIDEMENT OF FOOT Left 7/14/2019    Procedure: DEBRIDEMENT, FOOT, with left 5th ray amputation;  Surgeon: Sis Hickman DPM;  Location: [facility] OR [address] [place] [address];  Service: Podiatry;  Laterality: Left;    DEBRIDEMENT OF FOOT Left 7/17/2019    Procedure: DEBRIDEMENT, FOOT with leftt 5th ray partial amputation with Neox Graft;  Surgeon: Mai Burrell DPM;  Location: [facility] OR [address] [place] [address];  Service: Podiatry;  Laterality: Left;  t    TOE AMPUTATION Right 06/05/2015    TOE AMPUTATION Right 01/19/2017       Review of patient's allergies indicates:  No " Known Allergies    Family History     Problem Relation (Age of Onset)    Cancer Mother    Cataracts Father    Diabetes Mother, Sister    Hypertension Mother, Father, Maternal Aunt    No Known Problems Brother, Sister, Sister, Maternal Uncle, Paternal Aunt, Paternal Uncle, Maternal Grandmother, Maternal Grandfather, Paternal Grandmother, Paternal Grandfather        Tobacco Use    Smoking status: Never Smoker    Smokeless tobacco: Never Used   Substance and Sexual Activity    Alcohol use: No    Drug use: No    Sexual activity: Yes     Partners: Female     Birth control/protection: Condom      Review of Systems    COVID r/o  Objective:     Vital Signs (Most Recent):  Temp: 97.8 °F (36.6 °C) (03/20/20 2152)  Pulse: 102 (03/20/20 2209)  Resp: (!) 26 (03/20/20 2209)  BP: (!) 175/107 (03/20/20 2152)  SpO2: (!) 88 % (03/20/20 2211) Vital Signs (24h Range):  Temp:  [97.4 °F (36.3 °C)-97.8 °F (36.6 °C)] 97.8 °F (36.6 °C)  Pulse:  [] 102  Resp:  [17-26] 26  SpO2:  [86 %-91 %] 88 %  BP: (158-183)/() 175/107   Weight: 56.4 kg (124 lb 5.4 oz)  Body mass index is 25.11 kg/m².      Intake/Output Summary (Last 24 hours) at 3/20/2020 2229  Last data filed at 3/20/2020 2006  Gross per 24 hour   Intake 400 ml   Output 3700 ml   Net -3300 ml       Physical Exam   Constitutional:   Alert without acute distress   Cardiovascular: Normal rate.   Pulmonary/Chest:   Increased work of breathing    COVID r/o    Vents:  Oxygen Concentration (%): 36 (03/15/20 2100)  Lines/Drains/Airways     Peripheral Intravenous Line                 Peripheral IV - Single Lumen 03/14/20 0303 20 G Anterior;Right Forearm 6 days              Significant Labs:    CBC/Anemia Profile:  No results for input(s): WBC, HGB, HCT, PLT, MCV, RDW, IRON, FERRITIN, RETIC, FOLATE, [id number], OCCULTBLOOD in the last 48 hours.     Chemistries:  Recent Labs   Lab 03/19/20  0635   *   K 3.9      CO2 26   BUN 22*   CREATININE 3.6*   CALCIUM 8.2*    ALBUMIN 1.7*   PROT 7.5   BILITOT 0.2   ALKPHOS 125   ALT 7*   AST 39   PHOS 3.9       All pertinent labs within the past 24 hours have been reviewed.    Significant Imaging: I have reviewed and interpreted all pertinent imaging results/findings within the past 24 hours.    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 vanc toxicity vs COVID induced nephropathy  -- urinalysis/urine electrolytes consistent with infrarenal disease  -- Making good urine - Strict I/O for now and low threshold for Nephrology   -- Renal function panels q8  -- electrolyte replacement  -- Avoid nephrotoxic agents  -- renally dose medications    ID  Osteomyelitis of left foot  -- Podiatry and ID following  -- s/p iv zosyn  -- On cefazolin per ID ([facility]), pharm D for dosing  -- will need outpatient cefazolin once stable    Other  * Suspected Covid-19 Virus Infection  Acute Hypoxic Respiratory Failure    -- DDx including: aspiration event vs multifocal bacterial pneumonia.   -- Hypoxemia may be ARDS related to underlying sepsis from uncontrolled osteomyelitis vs COVID  -- COVID-19 testing sent.  - Infection Control notified  - Isolation: Airborne and Droplet Precautions  - Diagnostics (leukopenia, hyponatremia, hyperferritinemia, elevated troponin, elevated d-dimer, age, and comorbidities are significant predictors of poor clinical outcome)              - CBC no leukopenia or lymphonenia              - CMP with hyponatremia; now hypernatremic              - d-dimer, ferritin, troponin, CRP, LDH, Procalcitonin               - ECG non-ischemic              - rapid Flu neg              - RIP neg              - Legionella antigen              - Blood culture x2 NGTD              - CXR with bibasilar patchy infiltrates on arrival. Repeat s/p tube placement              - UA and culture neg    Vent Mode: A/C  Oxygen Concentration (%):  [100] 100  Resp Rate Total:  [18 br/min-20 br/min] 18 br/min  Vt Set:  [400 mL-500 mL] 400 mL  PEEP/CPAP:  [10 anO12-09 cmH20] 10 cmH20  Pressure Support:  [0 cmH20] 0 cmH20  Mean Airway Pressure:  [14 kpE48-29 cmH20] 14 cmH20    - Plan:              - Intubated, Sedated - Propofol/Fentanyl - wean as tolerated              - Telemetry & Continuous Pulse Ox   - Follow up COVID              - No steroids unless septic shock due to increased viral replication              - Empiric antibiotics for CAP/HCAP on arrival with zosyn, final day is 3/17, complete.    - Already on Cefazolin. Added Azithromycin for atypical coverage   - ID following for osteo         Critical secondary to Patient has a condition that poses threat to life and bodily function: Severe Respiratory Distress     Critical care was time spent personally by me on the following activities: development of treatment plan with patient or surrogate and bedside caregivers, discussions with consultants, evaluation of patient's response to treatment, examination of patient, ordering and performing treatments and interventions, ordering and review of laboratory studies, ordering and review of radiographic studies, pulse oximetry, re-evaluation of patient's condition. This critical care time did not overlap with that of any other provider or involve time for any procedures.     Christopher Pichardo MD  Critical Care Medicine  [facility]

## 2020-03-22 LAB
ALBUMIN SERPL BCP-MCNC: 1.5 G/DL (ref 3.5–5.2)
ALP SERPL-CCNC: 118 U/L (ref 55–135)
ALT SERPL W/O P-5'-P-CCNC: <5 U/L (ref 10–44)
ANION GAP SERPL CALC-SCNC: 15 MMOL/L (ref 8–16)
ANION GAP SERPL CALC-SCNC: 17 MMOL/L (ref 8–16)
AST SERPL-CCNC: 22 U/L (ref 10–40)
BASOPHILS # BLD AUTO: 0.01 K/UL (ref 0–0.2)
BASOPHILS NFR BLD: 0.1 % (ref 0–1.9)
BILIRUB SERPL-MCNC: 0.2 MG/DL (ref 0.1–1)
BUN SERPL-MCNC: 29 MG/DL (ref 6–20)
BUN SERPL-MCNC: 32 MG/DL (ref 6–20)
BUN SERPL-MCNC: 32 MG/DL (ref 6–20)
BUN SERPL-MCNC: 35 MG/DL (ref 6–20)
BUN SERPL-MCNC: 39 MG/DL (ref 6–20)
CALCIUM SERPL-MCNC: 8.1 MG/DL (ref 8.7–10.5)
CALCIUM SERPL-MCNC: 8.1 MG/DL (ref 8.7–10.5)
CALCIUM SERPL-MCNC: 8.2 MG/DL (ref 8.7–10.5)
CHLORIDE SERPL-SCNC: 100 MMOL/L (ref 95–110)
CHLORIDE SERPL-SCNC: 99 MMOL/L (ref 95–110)
CO2 SERPL-SCNC: 20 MMOL/L (ref 23–29)
CO2 SERPL-SCNC: 22 MMOL/L (ref 23–29)
CO2 SERPL-SCNC: 23 MMOL/L (ref 23–29)
CREAT SERPL-MCNC: 3 MG/DL (ref 0.5–1.4)
CREAT SERPL-MCNC: 3.1 MG/DL (ref 0.5–1.4)
DIFFERENTIAL METHOD: ABNORMAL
EOSINOPHIL # BLD AUTO: 0 K/UL (ref 0–0.5)
EOSINOPHIL NFR BLD: 0 % (ref 0–8)
ERYTHROCYTE [DISTWIDTH] IN BLOOD BY AUTOMATED COUNT: 13.1 % (ref 11.5–14.5)
EST. GFR  (AFRICAN AMERICAN): 25.5 ML/MIN/1.73 M^2
EST. GFR  (AFRICAN AMERICAN): 26.6 ML/MIN/1.73 M^2
EST. GFR  (NON AFRICAN AMERICAN): 22.1 ML/MIN/1.73 M^2
EST. GFR  (NON AFRICAN AMERICAN): 23 ML/MIN/1.73 M^2
GLUCOSE SERPL-MCNC: 268 MG/DL (ref 70–110)
GLUCOSE SERPL-MCNC: 302 MG/DL (ref 70–110)
GLUCOSE SERPL-MCNC: 302 MG/DL (ref 70–110)
GLUCOSE SERPL-MCNC: 350 MG/DL (ref 70–110)
GLUCOSE SERPL-MCNC: 391 MG/DL (ref 70–110)
HCT VFR BLD AUTO: 31.4 % (ref 40–54)
HGB BLD-MCNC: 9.5 G/DL (ref 14–18)
IMM GRANULOCYTES # BLD AUTO: 0.1 K/UL (ref 0–0.04)
IMM GRANULOCYTES NFR BLD AUTO: 1.2 % (ref 0–0.5)
LYMPHOCYTES # BLD AUTO: 0.7 K/UL (ref 1–4.8)
LYMPHOCYTES NFR BLD: 8 % (ref 18–48)
MAGNESIUM SERPL-MCNC: 2.3 MG/DL (ref 1.6–2.6)
MCH RBC QN AUTO: 29.1 PG (ref 27–31)
MCHC RBC AUTO-ENTMCNC: 30.3 G/DL (ref 32–36)
MCV RBC AUTO: 96 FL (ref 82–98)
MONOCYTES # BLD AUTO: 0.2 K/UL (ref 0.3–1)
MONOCYTES NFR BLD: 2.7 % (ref 4–15)
NEUTROPHILS # BLD AUTO: 7.2 K/UL (ref 1.8–7.7)
NEUTROPHILS NFR BLD: 88 % (ref 38–73)
NRBC BLD-RTO: 0 /100 WBC
PHOSPHATE SERPL-MCNC: 5.4 MG/DL (ref 2.7–4.5)
PHOSPHATE SERPL-MCNC: 5.5 MG/DL (ref 2.7–4.5)
PHOSPHATE SERPL-MCNC: 5.8 MG/DL (ref 2.7–4.5)
PHOSPHATE SERPL-MCNC: 5.8 MG/DL (ref 2.7–4.5)
PHOSPHATE SERPL-MCNC: 5.9 MG/DL (ref 2.7–4.5)
PLATELET # BLD AUTO: 327 K/UL (ref 150–350)
PMV BLD AUTO: 10.9 FL (ref 9.2–12.9)
POCT GLUCOSE: 232 MG/DL (ref 70–110)
POCT GLUCOSE: 298 MG/DL (ref 70–110)
POCT GLUCOSE: 327 MG/DL (ref 70–110)
POCT GLUCOSE: 336 MG/DL (ref 70–110)
POCT GLUCOSE: 355 MG/DL (ref 70–110)
POCT GLUCOSE: 365 MG/DL (ref 70–110)
POCT GLUCOSE: 368 MG/DL (ref 70–110)
POTASSIUM SERPL-SCNC: 4.2 MMOL/L (ref 3.5–5.1)
POTASSIUM SERPL-SCNC: 4.6 MMOL/L (ref 3.5–5.1)
POTASSIUM SERPL-SCNC: 4.7 MMOL/L (ref 3.5–5.1)
PROT SERPL-MCNC: 7.5 G/DL (ref 6–8.4)
RBC # BLD AUTO: 3.27 M/UL (ref 4.6–6.2)
SODIUM SERPL-SCNC: 136 MMOL/L (ref 136–145)
SODIUM SERPL-SCNC: 138 MMOL/L (ref 136–145)
WBC # BLD AUTO: 8.23 K/UL (ref 3.9–12.7)

## 2020-03-22 PROCEDURE — 99900026 HC AIRWAY MAINTENANCE (STAT)

## 2020-03-22 PROCEDURE — 63600175 PHARM REV CODE 636 W HCPCS: Performed by: STUDENT IN AN ORGANIZED HEALTH CARE EDUCATION/TRAINING PROGRAM

## 2020-03-22 PROCEDURE — 80053 COMPREHEN METABOLIC PANEL: CPT

## 2020-03-22 PROCEDURE — 94003 VENT MGMT INPAT SUBQ DAY: CPT

## 2020-03-22 PROCEDURE — 43752 NASAL/OROGASTRIC W/TUBE PLMT: CPT

## 2020-03-22 PROCEDURE — 36620 INSERTION CATHETER ARTERY: CPT

## 2020-03-22 PROCEDURE — 63600175 PHARM REV CODE 636 W HCPCS: Performed by: EMERGENCY MEDICINE

## 2020-03-22 PROCEDURE — 25000003 PHARM REV CODE 250: Performed by: STUDENT IN AN ORGANIZED HEALTH CARE EDUCATION/TRAINING PROGRAM

## 2020-03-22 PROCEDURE — 94761 N-INVAS EAR/PLS OXIMETRY MLT: CPT

## 2020-03-22 PROCEDURE — 83735 ASSAY OF MAGNESIUM: CPT

## 2020-03-22 PROCEDURE — 99291 PR CRITICAL CARE, E/M 30-74 MINUTES: ICD-10-PCS | Mod: ,,, | Performed by: EMERGENCY MEDICINE

## 2020-03-22 PROCEDURE — 63600175 PHARM REV CODE 636 W HCPCS: Performed by: INTERNAL MEDICINE

## 2020-03-22 PROCEDURE — 85025 COMPLETE CBC W/AUTO DIFF WBC: CPT

## 2020-03-22 PROCEDURE — 51701 INSERT BLADDER CATHETER: CPT

## 2020-03-22 PROCEDURE — 36556 INSERT NON-TUNNEL CV CATH: CPT

## 2020-03-22 PROCEDURE — 27000221 HC OXYGEN, UP TO 24 HOURS

## 2020-03-22 PROCEDURE — 80069 RENAL FUNCTION PANEL: CPT | Mod: 91

## 2020-03-22 PROCEDURE — 99291 CRITICAL CARE FIRST HOUR: CPT | Mod: ,,, | Performed by: EMERGENCY MEDICINE

## 2020-03-22 PROCEDURE — 27200188 HC TRANSDUCER, ART ADULT/PEDS

## 2020-03-22 PROCEDURE — 80069 RENAL FUNCTION PANEL: CPT

## 2020-03-22 PROCEDURE — C1752 CATH,HEMODIALYSIS,SHORT-TERM: HCPCS

## 2020-03-22 PROCEDURE — 31500 INSERT EMERGENCY AIRWAY: CPT

## 2020-03-22 PROCEDURE — 20000000 HC ICU ROOM

## 2020-03-22 PROCEDURE — 63600175 PHARM REV CODE 636 W HCPCS

## 2020-03-22 PROCEDURE — 99900035 HC TECH TIME PER 15 MIN (STAT)

## 2020-03-22 PROCEDURE — 94770 HC EXHALED C02 TEST: CPT

## 2020-03-22 PROCEDURE — 51702 INSERT TEMP BLADDER CATH: CPT

## 2020-03-22 PROCEDURE — C1751 CATH, INF, PER/CENT/MIDLINE: HCPCS

## 2020-03-22 PROCEDURE — S0028 INJECTION, FAMOTIDINE, 20 MG: HCPCS | Performed by: STUDENT IN AN ORGANIZED HEALTH CARE EDUCATION/TRAINING PROGRAM

## 2020-03-22 RX ORDER — PROPOFOL 10 MG/ML
INJECTION, EMULSION INTRAVENOUS
Status: COMPLETED
Start: 2020-03-22 | End: 2020-03-22

## 2020-03-22 RX ORDER — CEFAZOLIN SODIUM 1 G/3ML
2 INJECTION, POWDER, FOR SOLUTION INTRAMUSCULAR; INTRAVENOUS
Status: DISCONTINUED | OUTPATIENT
Start: 2020-03-22 | End: 2020-03-30 | Stop reason: HOSPADM

## 2020-03-22 RX ADMIN — HEPARIN SODIUM 5000 UNITS: 5000 INJECTION, SOLUTION INTRAVENOUS; SUBCUTANEOUS at 05:03

## 2020-03-22 RX ADMIN — HEPARIN SODIUM 5000 UNITS: 5000 INJECTION, SOLUTION INTRAVENOUS; SUBCUTANEOUS at 06:03

## 2020-03-22 RX ADMIN — PROPOFOL 40 MCG/KG/MIN: 10 INJECTION, EMULSION INTRAVENOUS at 05:03

## 2020-03-22 RX ADMIN — CEFAZOLIN 2 G: 1 INJECTION, POWDER, FOR SOLUTION INTRAMUSCULAR; INTRAVENOUS at 09:03

## 2020-03-22 RX ADMIN — INSULIN ASPART 4 UNITS: 100 INJECTION, SOLUTION INTRAVENOUS; SUBCUTANEOUS at 12:03

## 2020-03-22 RX ADMIN — PROPOFOL 40 MCG/KG/MIN: 10 INJECTION, EMULSION INTRAVENOUS at 01:03

## 2020-03-22 RX ADMIN — Medication 75 MCG/HR: at 05:03

## 2020-03-22 RX ADMIN — HEPARIN SODIUM 5000 UNITS: 5000 INJECTION, SOLUTION INTRAVENOUS; SUBCUTANEOUS at 09:03

## 2020-03-22 RX ADMIN — PROPOFOL 50 MCG/KG/MIN: 10 INJECTION, EMULSION INTRAVENOUS at 05:03

## 2020-03-22 RX ADMIN — PROPOFOL 45 MCG/KG/MIN: 10 INJECTION, EMULSION INTRAVENOUS at 11:03

## 2020-03-22 RX ADMIN — FAMOTIDINE 20 MG: 10 INJECTION INTRAVENOUS at 09:03

## 2020-03-22 RX ADMIN — AZITHROMYCIN 250 MG: 250 TABLET, FILM COATED ORAL at 08:03

## 2020-03-22 RX ADMIN — PROPOFOL 50 MCG/KG/MIN: 10 INJECTION, EMULSION INTRAVENOUS at 01:03

## 2020-03-22 RX ADMIN — PROPOFOL 45 MCG/KG/MIN: 10 INJECTION, EMULSION INTRAVENOUS at 09:03

## 2020-03-22 RX ADMIN — CEFAZOLIN 2 G: 1 INJECTION, POWDER, FOR SOLUTION INTRAMUSCULAR; INTRAVENOUS at 06:03

## 2020-03-22 RX ADMIN — METHYLPREDNISOLONE SODIUM SUCCINATE 40 MG: 125 INJECTION, POWDER, FOR SOLUTION INTRAMUSCULAR; INTRAVENOUS at 09:03

## 2020-03-22 RX ADMIN — PROPOFOL 47 MCG/KG/MIN: 10 INJECTION, EMULSION INTRAVENOUS at 08:03

## 2020-03-22 RX ADMIN — HYDROXYCHLOROQUINE SULFATE 200 MG: 200 TABLET, FILM COATED ORAL at 08:03

## 2020-03-22 RX ADMIN — PROPOFOL 50 MCG/KG/MIN: 10 INJECTION, EMULSION INTRAVENOUS at 04:03

## 2020-03-22 RX ADMIN — HYDROXYCHLOROQUINE SULFATE 200 MG: 200 TABLET, FILM COATED ORAL at 09:03

## 2020-03-22 NOTE — PROGRESS NOTES
Pharmacist Renal Dose Adjustment Note    Indio Ryder Jr. is a 49 y.o. male being treated with the medication cefazolin (2gm Q12H)    Recent Labs   Lab 03/22/20  0011 03/22/20  0400 03/22/20  1101   CREATININE 3.0* 3.0*  3.0* 3.1*     Serum creatinine: 3.1 mg/dL (H) 03/22/20 1101  Estimated creatinine clearance: 35.2 mL/min (A)    Cefazolin will be renally dose adjusted up to 2 gm Q8H for CrCl > 30 mL/min to prevent underdosing.  Will continue to monitor closely          Vickie Cortes, PharmD, BCPS  Neurocritical Care Pharmacist  [id number]

## 2020-03-22 NOTE — SUBJECTIVE & OBJECTIVE
Interval History/Significant Events: NAEON, patient weaning down on ventilator setting. Patient was on insulin gtt and not able to follow titration protocol. Blood glucose was low yesterday so insulin gtt was stopped. Today with increase blood glucose.     Review of Systems   Unable to perform ROS: Intubated     Objective:     Vital Signs (Most Recent):  Temp: 97.2 °F (36.2 °C) (03/22/20 0345)  Pulse: 72 (03/22/20 0600)  Resp: 18 (03/22/20 0600)  BP: 135/82 (03/22/20 0600)  SpO2: 98 % (03/22/20 0600) Vital Signs (24h Range):  Temp:  [97.2 °F (36.2 °C)-98.8 °F (37.1 °C)] 97.2 °F (36.2 °C)  Pulse:  [72-99] 72  Resp:  [14-39] 18  SpO2:  [93 %-100 %] 98 %  BP: (100-145)/(64-94) 135/82  Arterial Line BP: ()/(51-77) 140/68   Weight: 101.2 kg (223 lb 1.7 oz)  Body mass index is 29.44 kg/m².      Intake/Output Summary (Last 24 hours) at 3/22/2020 0844  Last data filed at 3/22/2020 0600  Gross per 24 hour   Intake 1477.09 ml   Output 1175 ml   Net 302.09 ml       Physical Exam   Constitutional: He appears well-developed and well-nourished.   Alert without acute distress. Patient exam please see attending attestation.    Cardiovascular: Normal rate.   Pulmonary/Chest:   Increased work of breathing       Vents:  Vent Mode: A/C (03/22/20 0212)  Ventilator Initiated: Yes (03/20/20 2359)  Set Rate: 18 BPM (03/22/20 0212)  Vt Set: 480 mL (03/22/20 0212)  Pressure Support: 0 cmH20 (03/22/20 0212)  PEEP/CPAP: 14 cmH20 (03/22/20 0212)  Oxygen Concentration (%): 40 (03/22/20 0600)  Peak Airway Pressure: 29 cmH2O (03/22/20 0212)  Plateau Pressure: 0 cmH20 (03/22/20 0212)  Total Ve: 8.95 mL (03/22/20 0212)  F/VT Ratio<105 (RSBI): (!) 36.14 (03/22/20 0212)  Lines/Drains/Airways     Central Venous Catheter Line                 Hemodialysis Catheter 03/20/20 1142 1 day    Permacath 03/20/20 1142 1 day    Trialysis (Dialysis) Catheter 03/21/20 0020 right internal jugular 1 day          Drain                 NG/OG Tube 03/21/20 0147  orogastric 14 Fr. Center mouth 1 day         Urethral Catheter 03/21/20 0147 Straight-tip 16 Fr. 1 day          Airway                 Airway - Non-Surgical 03/20/20 2310 Endotracheal Tube 1 day          Arterial Line                 Arterial Line 03/20/20 2359 Left Radial 1 day          Peripheral Intravenous Line                 Peripheral IV - Single Lumen 03/14/20 0303 20 G Anterior;Right Forearm 8 days         Peripheral IV - Single Lumen 03/21/20 0144 20 G Right Hand 1 day         Peripheral IV - Single Lumen 03/21/20 0145 18 G Right Wrist 1 day              Significant Labs:    CBC/Anemia Profile:  Recent Labs   Lab 03/21/20  0124 03/21/20  0458 03/22/20  0400   WBC 8.44 8.49 8.23   HGB 9.8* 10.0* 9.5*   HCT 32.0* 32.6* 31.4*   * 357* 327   MCV 94 95 96   RDW 12.7 13.0 13.1   FERRITIN 543*  --   --         Chemistries:  Recent Labs   Lab 03/21/20  0124 03/21/20  0458 03/21/20  1427 03/22/20  0011 03/22/20  0400     142 138 137 136 138  138   K 3.5  3.5 3.4* 3.2* 4.7 4.2  4.2     102 100 99 99 99  99   CO2 26  26 28 29 20* 22*  22*   BUN 20  20 21* 23* 29* 32*  32*   CREATININE 2.4*  2.4* 2.6* 3.0* 3.0* 3.0*  3.0*   CALCIUM 7.9*  7.9* 7.8* 7.9* 8.2* 8.2*  8.2*   ALBUMIN 1.6*  1.6* 1.6* 1.5* 1.5* 1.5*  1.5*   PROT 7.1 7.3  --   --  7.5   BILITOT 0.2 0.2  --   --  0.2   ALKPHOS 120 123  --   --  118   ALT <5* <5*  --   --  <5*   AST 27 26  --   --  22   MG 2.1 2.1  --   --  2.3   PHOS 2.4*  2.4* 2.7 3.7 5.4* 5.8*  5.8*       CMP:   Recent Labs   Lab 03/21/20  0124 03/21/20  0458  03/22/20  0011 03/22/20  0400 03/22/20  1101     142 138   < > 136 138  138 138   K 3.5  3.5 3.4*   < > 4.7 4.2  4.2 4.2     102 100   < > 99 99  99 99   CO2 26  26 28   < > 20* 22*  22* 22*   *  218* 244*   < > 268* 302*  302* 350*   BUN 20  20 21*   < > 29* 32*  32* 35*   CREATININE 2.4*  2.4* 2.6*   < > 3.0* 3.0*  3.0* 3.1*   CALCIUM 7.9*  7.9* 7.8*   < > 8.2*  8.2*  8.2* 8.1*   PROT 7.1 7.3  --   --  7.5  --    ALBUMIN 1.6*  1.6* 1.6*   < > 1.5* 1.5*  1.5* 1.5*   BILITOT 0.2 0.2  --   --  0.2  --    ALKPHOS 120 123  --   --  118  --    AST 27 26  --   --  22  --    ALT <5* <5*  --   --  <5*  --    ANIONGAP 14  14 10   < > 17* 17*  17* 17*   EGFRNONAA 30.1*  30.1* 27.3*   < > 23.0* 23.0*  23.0* 22.1*    < > = values in this interval not displayed.     All pertinent labs within the past 24 hours have been reviewed.    Significant Imaging:  I have reviewed all pertinent imaging results/findings within the past 24 hours.

## 2020-03-22 NOTE — ASSESSMENT & PLAN NOTE
Patient's Cr elevated from his baseline. TAHIR most likely due to Pre-renal vs AIN from zosyn vs. vanc toxicity vs COVID induced nephropathy  -- urinalysis/urine electrolytes consistent with intrarenal disease  -- Making good urine   -- Strict I/O for now and low threshold for Nephrology   -- Renal function panels q8  -- electrolyte replacement  -- Avoid nephrotoxic agents  -- renally dose medications

## 2020-03-22 NOTE — ASSESSMENT & PLAN NOTE
Uncontrolled - A1C 12, became hypoglycemic with glucose of 2.6U/hr.  Home meds insulin detemir 25U qhs, aspart 10 tidwm, victoza, and metformin  - Insulin gtt for now while intubated. Would imagine glucose will be elevated due to steroid. Start with 1.5 units  - diabetic diet when extubated

## 2020-03-22 NOTE — ASSESSMENT & PLAN NOTE
Acute Hypoxic Respiratory Failure    -- DDx including: aspiration event vs multifocal bacterial pneumonia.   -- Hypoxemia may be ARDS related to underlying sepsis from uncontrolled osteomyelitis vs COVID  -- COVID-19 testing pending  - Infection Control notified  - Isolation: Airborne and Droplet Precautions  - Diagnostics (leukopenia, hyponatremia, hyperferritinemia, elevated troponin, elevated d-dimer, age, and comorbidities are significant predictors of poor clinical outcome)              - CBC no leukopenia or lymphonenia              - CMP with hyponatremia; now hypernatremic              - d-dimer, ferritin, troponin, CRP, LDH, Procalcitonin              - ECG non-ischemic              - rapid Flu neg              - RIP neg              - Legionella antigen              - Blood culture x2 NGTD              - CXR with bibasilar patchy infiltrates on arrival. Repeat s/p tube placement              - UA and culture neg    Vent Mode: A/C  Oxygen Concentration (%):  [40-80] 40  Resp Rate Total:  [18 br/min-34 br/min] 18 br/min  Vt Set:  [400 mL-480 mL] 480 mL  PEEP/CPAP:  [10 zvY71-09 cmH20] 12 cmH20  Pressure Support:  [0 cmH20] 0 cmH20  Mean Airway Pressure:  [15 clO59-40 cmH20] 17 cmH20    - Plan:              - Intubated, Sedated - Propofol/Fentanyl - wean as tolerated              - Telemetry & Continuous Pulse Ox   - Follow up COVID result              - Starting on Methylprednisolone 40mg daily (3/26 stop)              - Start on hydroxychloroquine 5 day course (3/26 stop)               - Empiric antibiotics for CAP/HCAP on arrival with zosyn, final day is 3/17, completed.    - Already on Cefazolin. Added Azithromycin for synergistic effect for possible COVID    - ID following for osteo

## 2020-03-22 NOTE — CARE UPDATE
Notified Ms White about the status of her brother. Notified family that patient is still supported by ventilator and critically ill. Family will get notified if we try SBT tonight and see how he does. All questions answered.    YANNICK Lima M.D.  Critical Care Medicine PGY-2  [phone]

## 2020-03-22 NOTE — PROGRESS NOTES
"[facility]  Critical Care Medicine  Progress Note    Patient Name: Indio Ryder Jr.  MRN: [id number]  Admission Date: 3/10/2020  Hospital Length of Stay: 12 days  Code Status: Full Code  Attending Provider: Jw Stanley MD  Primary Care Provider: Lorena Thakur MD   Principal Problem: Suspected Covid-19 Virus Infection    Subjective:     HPI:  Per  HPI    "Mr. Ryder is 48 y/o with a history of poorly controlled DM2 ( last Ha1c 8.4 ) , DKA with coma, diabetic neuropathy, diabatic retinopathy and diabetic foot with multiple amputation who presents to ED with chief complaint of chills associated with nausea and vomiting for one day. He sates that yesterday he started to feel ill and he has been having chills associated with nauseas and vomiting. He also endorses poor appetite with decreased oral intake for the last week. Denies fever, abdominal pain, feet or leg pain, leg swelling, CP, SOB, dysuria. He a history of poorly control diabetes with amputation of his right big and 5th toes. He undergone left big toe detriment and imputation on 7/14/2019. He reports not feeling well for the last couple of weeks and he felt about to pass out on [employer] gras and his BP was in 90/50. He was seen by podiatry last  Thursday when he had his wound opened and he was prescribed doxycycline for 10 days.      In ED, he was afebrile, vitally stable. Lab, BS: 382, B-hydroxybutyrate 3.6, HCO3 22,  anion gap 18. WBC: 6.9, lactate: 1.2  X-ray of right food shows soft tissue edema concerning for osteomyelitis. He received single dose of vanc/zosyn and 2L IVF.     Overview/Hospital Course:  Admitted to . Started on insulin gtt and IVF infusion. Pt is currently treated with doxycyline for wound in left foot. ID and podiatry consulted. MRI with acute osteomyelitis of 4th metatarsal head and neck of left proximal phalanx. ID recommended cefazolin and podiatry plan for for amputation. Pt is refusing amputation but " "agree to have agressive washout if he keep spiking fever. Pt transitioned to subQ insulin and tolerating PO. Blood ux grwe Staph coagulase negative, TTE done and shows normal valves with no vegetations. Pt had drop in his SpO2 to 85 with need for 5L oxygen. Respiratory cx with many GPC and CXR shows b/l patchy infiltration. Pt still spiking fever and in acute respiratory failure. Concern for COVID-19, test is sent. "     Rapid response called on pt as oxygen requirements are slowly increasing. Pt with oxygen saturations of 88% on 8L NC. Pt transferred to CMICU and intubated the unit.     Hospital/ICU Course:  MR Ryder was on [facility] getting treated for his left foot osteomyelitis, on the night of 3/20 patient became hypoxic and requiring ICU admission. Patient suspected to have COVID -19. Patient was intubated started on steroid, hydroxychlorquine, and Azithromycin. Patient continue to get his left osteo treated with cefazolin.     Interval History/Significant Events: NAEON, patient weaning down on ventilator setting. Patient was on insulin gtt and not able to follow titration protocol. Blood glucose was low yesterday so insulin gtt was stopped. Today with increase blood glucose.     Review of Systems   Unable to perform ROS: Intubated     Objective:     Vital Signs (Most Recent):  Temp: 97.2 °F (36.2 °C) (03/22/20 0345)  Pulse: 72 (03/22/20 0600)  Resp: 18 (03/22/20 0600)  BP: 135/82 (03/22/20 0600)  SpO2: 98 % (03/22/20 0600) Vital Signs (24h Range):  Temp:  [97.2 °F (36.2 °C)-98.8 °F (37.1 °C)] 97.2 °F (36.2 °C)  Pulse:  [72-99] 72  Resp:  [14-39] 18  SpO2:  [93 %-100 %] 98 %  BP: (100-145)/(64-94) 135/82  Arterial Line BP: ()/(51-77) 140/68   Weight: 101.2 kg (223 lb 1.7 oz)  Body mass index is 29.44 kg/m².      Intake/Output Summary (Last 24 hours) at 3/22/2020 0844  Last data filed at 3/22/2020 0600  Gross per 24 hour   Intake 1477.09 ml   Output 1175 ml   Net 302.09 ml       Physical Exam "   Constitutional: He appears well-developed and well-nourished.   Alert without acute distress. Patient exam please see attending attestation.    Cardiovascular: Normal rate.   Pulmonary/Chest:   Increased work of breathing       Vents:  Vent Mode: A/C (03/22/20 0212)  Ventilator Initiated: Yes (03/20/20 2359)  Set Rate: 18 BPM (03/22/20 0212)  Vt Set: 480 mL (03/22/20 0212)  Pressure Support: 0 cmH20 (03/22/20 0212)  PEEP/CPAP: 14 cmH20 (03/22/20 0212)  Oxygen Concentration (%): 40 (03/22/20 0600)  Peak Airway Pressure: 29 cmH2O (03/22/20 0212)  Plateau Pressure: 0 cmH20 (03/22/20 0212)  Total Ve: 8.95 mL (03/22/20 0212)  F/VT Ratio<105 (RSBI): (!) 36.14 (03/22/20 0212)  Lines/Drains/Airways     Central Venous Catheter Line                 Hemodialysis Catheter 03/20/20 1142 1 day    Permacath 03/20/20 1142 1 day    Trialysis (Dialysis) Catheter 03/21/20 0020 right internal jugular 1 day          Drain                 NG/OG Tube 03/21/20 0147 orogastric 14 Fr. Center mouth 1 day         Urethral Catheter 03/21/20 0147 Straight-tip 16 Fr. 1 day          Airway                 Airway - Non-Surgical 03/20/20 2310 Endotracheal Tube 1 day          Arterial Line                 Arterial Line 03/20/20 2359 Left Radial 1 day          Peripheral Intravenous Line                 Peripheral IV - Single Lumen 03/14/20 0303 20 G Anterior;Right Forearm 8 days         Peripheral IV - Single Lumen 03/21/20 0144 20 G Right Hand 1 day         Peripheral IV - Single Lumen 03/21/20 0145 18 G Right Wrist 1 day              Significant Labs:    CBC/Anemia Profile:  Recent Labs   Lab 03/21/20  0124 03/21/20  0458 03/22/20  0400   WBC 8.44 8.49 8.23   HGB 9.8* 10.0* 9.5*   HCT 32.0* 32.6* 31.4*   * 357* 327   MCV 94 95 96   RDW 12.7 13.0 13.1   FERRITIN 543*  --   --         Chemistries:  Recent Labs   Lab 03/21/20  0124 03/21/20  0458 03/21/20  1427 03/22/20  0011 03/22/20  0400     142 138 137 136 138  138   K 3.5  3.5  3.4* 3.2* 4.7 4.2  4.2     102 100 99 99 99  99   CO2 26  26 28 29 20* 22*  22*   BUN 20  20 21* 23* 29* 32*  32*   CREATININE 2.4*  2.4* 2.6* 3.0* 3.0* 3.0*  3.0*   CALCIUM 7.9*  7.9* 7.8* 7.9* 8.2* 8.2*  8.2*   ALBUMIN 1.6*  1.6* 1.6* 1.5* 1.5* 1.5*  1.5*   PROT 7.1 7.3  --   --  7.5   BILITOT 0.2 0.2  --   --  0.2   ALKPHOS 120 123  --   --  118   ALT <5* <5*  --   --  <5*   AST 27 26  --   --  22   MG 2.1 2.1  --   --  2.3   PHOS 2.4*  2.4* 2.7 3.7 5.4* 5.8*  5.8*       CMP:   Recent Labs   Lab 03/21/20  0124 03/21/20  0458  03/22/20  0011 03/22/20  0400 03/22/20  1101     142 138   < > 136 138  138 138   K 3.5  3.5 3.4*   < > 4.7 4.2  4.2 4.2     102 100   < > 99 99  99 99   CO2 26  26 28   < > 20* 22*  22* 22*   *  218* 244*   < > 268* 302*  302* 350*   BUN 20  20 21*   < > 29* 32*  32* 35*   CREATININE 2.4*  2.4* 2.6*   < > 3.0* 3.0*  3.0* 3.1*   CALCIUM 7.9*  7.9* 7.8*   < > 8.2* 8.2*  8.2* 8.1*   PROT 7.1 7.3  --   --  7.5  --    ALBUMIN 1.6*  1.6* 1.6*   < > 1.5* 1.5*  1.5* 1.5*   BILITOT 0.2 0.2  --   --  0.2  --    ALKPHOS 120 123  --   --  118  --    AST 27 26  --   --  22  --    ALT <5* <5*  --   --  <5*  --    ANIONGAP 14  14 10   < > 17* 17*  17* 17*   EGFRNONAA 30.1*  30.1* 27.3*   < > 23.0* 23.0*  23.0* 22.1*    < > = values in this interval not displayed.     All pertinent labs within the past 24 hours have been reviewed.    Significant Imaging:  I have reviewed all pertinent imaging results/findings within the past 24 hours.      ABG  No results for input(s): PH, PO2, PCO2, HCO3, BE in the last 168 hours.  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  Home meds insulin detemir 25U qhs, aspart 10 tidwm, victoza, and metformin  -  Insulin gtt for now while intubated. Would imagine glucose will be elevated due to steroid. Start with 1.5 units  - diabetic diet when extubated    Cardiac/Vascular  Mixed hyperlipidemia  - Continue statin    Renal/  TAHIR (acute kidney injury)  Patient's Cr elevated from his baseline. TAHIR most likely due to Pre-renal vs AIN from zosyn vs. vanc toxicity vs COVID induced nephropathy  -- urinalysis/urine electrolytes consistent with intrarenal disease  -- Making good urine   -- Strict I/O for now and low threshold for Nephrology   -- Renal function panels q8  -- electrolyte replacement  -- Avoid nephrotoxic agents  -- renally dose medications    ID  Osteomyelitis of left foot  -- Podiatry and ID following  -- s/p iv zosyn. Stopped Zosyn   -- On cefazolin per ID (MSSA), pharm D for dosing  -- will need outpatient cefazolin once stable    Oncology  Anemia  Hgb stable     Other  * Suspected Covid-19 Virus Infection  Acute Hypoxic Respiratory Failure    -- DDx including: aspiration event vs multifocal bacterial pneumonia.   -- Hypoxemia may be ARDS related to underlying sepsis from uncontrolled osteomyelitis vs COVID  -- COVID-19 testing pending  - Infection Control notified  - Isolation: Airborne and Droplet Precautions  - Diagnostics (leukopenia, hyponatremia, hyperferritinemia, elevated troponin, elevated d-dimer, age, and comorbidities are significant predictors of poor clinical outcome)              - CBC no leukopenia or lymphonenia              - CMP with hyponatremia; now hypernatremic              - d-dimer, ferritin, troponin, CRP, LDH, Procalcitonin              - ECG non-ischemic              - rapid Flu neg              - RIP neg              - Legionella antigen              - Blood culture x2 NGTD              - CXR with bibasilar patchy infiltrates on arrival. Repeat s/p tube placement              - UA and culture neg    Vent Mode: A/C  Oxygen Concentration (%):  [40-80] 40  Resp Rate Total:  [18  br/min-34 br/min] 18 br/min  Vt Set:  [400 mL-480 mL] 480 mL  PEEP/CPAP:  [10 hxU83-95 cmH20] 12 cmH20  Pressure Support:  [0 cmH20] 0 cmH20  Mean Airway Pressure:  [15 wkT15-68 cmH20] 17 cmH20    - Plan:              - Intubated, Sedated - Propofol/Fentanyl - wean as tolerated              - Telemetry & Continuous Pulse Ox   - Follow up COVID result              - Starting on Methylprednisolone 40mg daily (3/26 stop)              - Start on hydroxychloroquine 5 day course (3/26 stop)               - Empiric antibiotics for CAP/HCAP on arrival with zosyn, final day is 3/17, completed.    - Already on Cefazolin. Added Azithromycin for synergistic effect for possible COVID    - ID following for osteo        Critical Care Daily Checklist:     A: Awake: RASS Goal/Actual Goal:    Actual: Deshpande Agitation Sedation Scale (RASS): Unarousable   B: Spontaneous Breathing Trial Performed?    C: SAT & SBT Coordinated?  No                     D: Delirium: CAM-ICU Overall CAM-ICU: Positive   E: Early Mobility Performed? No   F: Feeding Goal: Goals: Meet % EEN, EPN by RD f/u date  Status: Nutrition Goal Status: new   Current Diet Order   Procedures    Diet NPO       AS: Analgesia/Sedation Fentanyl and propofol   T: Thromboembolic Prophylaxis Hepaprin   H: HOB > 300 Yes   U: Stress Ulcer Prophylaxis (if needed) Famotidine    G: Glucose Control Insulin gtt   B: Bowel Function Stool Occurrence: 1   I: Indwelling Catheter (Lines & Beck) Necessity Central A-line and Beck    D: De-escalation of Antimicrobials/Pharmacotherapies Cefazolin, Azithromycin     Plan for the day/ETD Wean off vent     Code Status:  Family/Goals of Care: Full Code           Critical secondary to Patient has a condition that poses threat to life and bodily function: Severe Respiratory Distress      Critical care was time spent personally by me on the following activities: development of treatment plan with patient or surrogate and bedside caregivers,  discussions with consultants, evaluation of patient's response to treatment, examination of patient, ordering and performing treatments and interventions, ordering and review of laboratory studies, ordering and review of radiographic studies, pulse oximetry, re-evaluation of patient's condition. This critical care time did not overlap with that of any other provider or involve time for any procedures.     YANNICK Lima MD  Critical Care Medicine  [facility]

## 2020-03-22 NOTE — HOSPITAL COURSE
Patient initially admitted to hospital medicine for DKA and left foot osteomyelitis, on the night of 3/20 patient became hypoxic and requiring ICU admission. Patient suspected to have COVID, test pending. Patient was intubated started on steroid, hydroxychlorquine, and Azithromycin - all completed. Continued cefazolin, per ID recs for left foot osteo. Overall, improving on vent with decreasing vent requirements. DKA/HHS resolved with insuin gtt although still having adjusting insulin regimen for better control. On 3/25, patient tolerated SBT for 3 hours. Continuing to wean propofol and started precedex. Patient extubated 3/23.     Stable for stepdown to floor.

## 2020-03-23 PROBLEM — E11.621 DIABETIC ULCER OF LEFT MIDFOOT ASSOCIATED WITH TYPE 2 DIABETES MELLITUS, WITH BONE INVOLVEMENT WITHOUT EVIDENCE OF NECROSIS: Status: RESOLVED | Noted: 2019-08-02 | Resolved: 2020-03-23

## 2020-03-23 PROBLEM — L97.426 DIABETIC ULCER OF LEFT MIDFOOT ASSOCIATED WITH TYPE 2 DIABETES MELLITUS, WITH BONE INVOLVEMENT WITHOUT EVIDENCE OF NECROSIS: Status: RESOLVED | Noted: 2019-08-02 | Resolved: 2020-03-23

## 2020-03-23 PROBLEM — A41.9 SEPSIS WITH ACUTE ORGAN DYSFUNCTION: Status: RESOLVED | Noted: 2019-07-12 | Resolved: 2020-03-23

## 2020-03-23 PROBLEM — R65.20 SEPSIS WITH ACUTE ORGAN DYSFUNCTION: Status: RESOLVED | Noted: 2019-07-12 | Resolved: 2020-03-23

## 2020-03-23 LAB
ALBUMIN SERPL BCP-MCNC: 1.5 G/DL (ref 3.5–5.2)
ALBUMIN SERPL BCP-MCNC: 1.5 G/DL (ref 3.5–5.2)
ALBUMIN SERPL BCP-MCNC: 1.6 G/DL (ref 3.5–5.2)
ALBUMIN SERPL BCP-MCNC: 1.6 G/DL (ref 3.5–5.2)
ALP SERPL-CCNC: 103 U/L (ref 55–135)
ALT SERPL W/O P-5'-P-CCNC: <5 U/L (ref 10–44)
ANION GAP SERPL CALC-SCNC: 11 MMOL/L (ref 8–16)
ANION GAP SERPL CALC-SCNC: 12 MMOL/L (ref 8–16)
ANION GAP SERPL CALC-SCNC: 13 MMOL/L (ref 8–16)
ANION GAP SERPL CALC-SCNC: 9 MMOL/L (ref 8–16)
AST SERPL-CCNC: 13 U/L (ref 10–40)
BACTERIA SPEC AEROBE CULT: ABNORMAL
BACTERIA SPEC AEROBE CULT: ABNORMAL
BASOPHILS # BLD AUTO: 0.01 K/UL (ref 0–0.2)
BASOPHILS NFR BLD: 0.1 % (ref 0–1.9)
BILIRUB SERPL-MCNC: 0.1 MG/DL (ref 0.1–1)
BUN SERPL-MCNC: 44 MG/DL (ref 6–20)
BUN SERPL-MCNC: 46 MG/DL (ref 6–20)
CALCIUM SERPL-MCNC: 8 MG/DL (ref 8.7–10.5)
CALCIUM SERPL-MCNC: 8 MG/DL (ref 8.7–10.5)
CALCIUM SERPL-MCNC: 8.1 MG/DL (ref 8.7–10.5)
CALCIUM SERPL-MCNC: 8.3 MG/DL (ref 8.7–10.5)
CHLORIDE SERPL-SCNC: 101 MMOL/L (ref 95–110)
CHLORIDE SERPL-SCNC: 102 MMOL/L (ref 95–110)
CHLORIDE SERPL-SCNC: 104 MMOL/L (ref 95–110)
CHLORIDE SERPL-SCNC: 105 MMOL/L (ref 95–110)
CO2 SERPL-SCNC: 24 MMOL/L (ref 23–29)
CO2 SERPL-SCNC: 25 MMOL/L (ref 23–29)
CO2 SERPL-SCNC: 28 MMOL/L (ref 23–29)
CO2 SERPL-SCNC: 29 MMOL/L (ref 23–29)
CREAT SERPL-MCNC: 2.9 MG/DL (ref 0.5–1.4)
CREAT SERPL-MCNC: 2.9 MG/DL (ref 0.5–1.4)
CREAT SERPL-MCNC: 3.1 MG/DL (ref 0.5–1.4)
CREAT SERPL-MCNC: 3.1 MG/DL (ref 0.5–1.4)
CRP SERPL-MCNC: 97.4 MG/L (ref 0–8.2)
D DIMER PPP IA.FEU-MCNC: 2.35 MG/L FEU
DIFFERENTIAL METHOD: ABNORMAL
EOSINOPHIL # BLD AUTO: 0 K/UL (ref 0–0.5)
EOSINOPHIL NFR BLD: 0 % (ref 0–8)
ERYTHROCYTE [DISTWIDTH] IN BLOOD BY AUTOMATED COUNT: 13.2 % (ref 11.5–14.5)
EST. GFR  (AFRICAN AMERICAN): 25.5 ML/MIN/1.73 M^2
EST. GFR  (AFRICAN AMERICAN): 25.5 ML/MIN/1.73 M^2
EST. GFR  (AFRICAN AMERICAN): 27.7 ML/MIN/1.73 M^2
EST. GFR  (AFRICAN AMERICAN): 27.7 ML/MIN/1.73 M^2
EST. GFR  (NON AFRICAN AMERICAN): 22.1 ML/MIN/1.73 M^2
EST. GFR  (NON AFRICAN AMERICAN): 22.1 ML/MIN/1.73 M^2
EST. GFR  (NON AFRICAN AMERICAN): 23.9 ML/MIN/1.73 M^2
EST. GFR  (NON AFRICAN AMERICAN): 23.9 ML/MIN/1.73 M^2
GLUCOSE SERPL-MCNC: 123 MG/DL (ref 70–110)
GLUCOSE SERPL-MCNC: 194 MG/DL (ref 70–110)
GLUCOSE SERPL-MCNC: 319 MG/DL (ref 70–110)
GLUCOSE SERPL-MCNC: 396 MG/DL (ref 70–110)
GRAM STN SPEC: ABNORMAL
HCT VFR BLD AUTO: 29.3 % (ref 40–54)
HGB BLD-MCNC: 8.8 G/DL (ref 14–18)
IMM GRANULOCYTES # BLD AUTO: 0.06 K/UL (ref 0–0.04)
IMM GRANULOCYTES NFR BLD AUTO: 0.7 % (ref 0–0.5)
LYMPHOCYTES # BLD AUTO: 0.9 K/UL (ref 1–4.8)
LYMPHOCYTES NFR BLD: 9.6 % (ref 18–48)
MAGNESIUM SERPL-MCNC: 2.6 MG/DL (ref 1.6–2.6)
MCH RBC QN AUTO: 28.1 PG (ref 27–31)
MCHC RBC AUTO-ENTMCNC: 30 G/DL (ref 32–36)
MCV RBC AUTO: 94 FL (ref 82–98)
MONOCYTES # BLD AUTO: 0.7 K/UL (ref 0.3–1)
MONOCYTES NFR BLD: 7.9 % (ref 4–15)
NEUTROPHILS # BLD AUTO: 7.4 K/UL (ref 1.8–7.7)
NEUTROPHILS NFR BLD: 81.7 % (ref 38–73)
NRBC BLD-RTO: 0 /100 WBC
PHOSPHATE SERPL-MCNC: 3.4 MG/DL (ref 2.7–4.5)
PHOSPHATE SERPL-MCNC: 3.4 MG/DL (ref 2.7–4.5)
PHOSPHATE SERPL-MCNC: 4 MG/DL (ref 2.7–4.5)
PHOSPHATE SERPL-MCNC: 4.9 MG/DL (ref 2.7–4.5)
PLATELET # BLD AUTO: 355 K/UL (ref 150–350)
PMV BLD AUTO: 10.6 FL (ref 9.2–12.9)
POCT GLUCOSE: 114 MG/DL (ref 70–110)
POCT GLUCOSE: 124 MG/DL (ref 70–110)
POCT GLUCOSE: 189 MG/DL (ref 70–110)
POCT GLUCOSE: 193 MG/DL (ref 70–110)
POCT GLUCOSE: 289 MG/DL (ref 70–110)
POCT GLUCOSE: 318 MG/DL (ref 70–110)
POTASSIUM SERPL-SCNC: 3.6 MMOL/L (ref 3.5–5.1)
POTASSIUM SERPL-SCNC: 3.9 MMOL/L (ref 3.5–5.1)
POTASSIUM SERPL-SCNC: 4.2 MMOL/L (ref 3.5–5.1)
POTASSIUM SERPL-SCNC: 4.5 MMOL/L (ref 3.5–5.1)
PROT SERPL-MCNC: 7.1 G/DL (ref 6–8.4)
RBC # BLD AUTO: 3.13 M/UL (ref 4.6–6.2)
SODIUM SERPL-SCNC: 137 MMOL/L (ref 136–145)
SODIUM SERPL-SCNC: 140 MMOL/L (ref 136–145)
SODIUM SERPL-SCNC: 143 MMOL/L (ref 136–145)
SODIUM SERPL-SCNC: 143 MMOL/L (ref 136–145)
TRIGL SERPL-MCNC: 161 MG/DL (ref 30–150)
WBC # BLD AUTO: 9.02 K/UL (ref 3.9–12.7)

## 2020-03-23 PROCEDURE — 63600175 PHARM REV CODE 636 W HCPCS: Performed by: STUDENT IN AN ORGANIZED HEALTH CARE EDUCATION/TRAINING PROGRAM

## 2020-03-23 PROCEDURE — 83735 ASSAY OF MAGNESIUM: CPT

## 2020-03-23 PROCEDURE — 25000003 PHARM REV CODE 250: Performed by: STUDENT IN AN ORGANIZED HEALTH CARE EDUCATION/TRAINING PROGRAM

## 2020-03-23 PROCEDURE — 99900035 HC TECH TIME PER 15 MIN (STAT)

## 2020-03-23 PROCEDURE — 99900026 HC AIRWAY MAINTENANCE (STAT)

## 2020-03-23 PROCEDURE — 85379 FIBRIN DEGRADATION QUANT: CPT

## 2020-03-23 PROCEDURE — 86140 C-REACTIVE PROTEIN: CPT

## 2020-03-23 PROCEDURE — 94003 VENT MGMT INPAT SUBQ DAY: CPT

## 2020-03-23 PROCEDURE — 27000221 HC OXYGEN, UP TO 24 HOURS

## 2020-03-23 PROCEDURE — 80053 COMPREHEN METABOLIC PANEL: CPT

## 2020-03-23 PROCEDURE — 94770 HC EXHALED C02 TEST: CPT

## 2020-03-23 PROCEDURE — 80069 RENAL FUNCTION PANEL: CPT | Mod: 91

## 2020-03-23 PROCEDURE — S0028 INJECTION, FAMOTIDINE, 20 MG: HCPCS | Performed by: STUDENT IN AN ORGANIZED HEALTH CARE EDUCATION/TRAINING PROGRAM

## 2020-03-23 PROCEDURE — C9399 UNCLASSIFIED DRUGS OR BIOLOG: HCPCS | Performed by: STUDENT IN AN ORGANIZED HEALTH CARE EDUCATION/TRAINING PROGRAM

## 2020-03-23 PROCEDURE — 63600175 PHARM REV CODE 636 W HCPCS: Performed by: NURSE PRACTITIONER

## 2020-03-23 PROCEDURE — 63600175 PHARM REV CODE 636 W HCPCS: Performed by: EMERGENCY MEDICINE

## 2020-03-23 PROCEDURE — 84478 ASSAY OF TRIGLYCERIDES: CPT

## 2020-03-23 PROCEDURE — 82955 ASSAY OF G6PD ENZYME: CPT

## 2020-03-23 PROCEDURE — 84100 ASSAY OF PHOSPHORUS: CPT

## 2020-03-23 PROCEDURE — 94761 N-INVAS EAR/PLS OXIMETRY MLT: CPT

## 2020-03-23 PROCEDURE — 36415 COLL VENOUS BLD VENIPUNCTURE: CPT

## 2020-03-23 PROCEDURE — 99291 PR CRITICAL CARE, E/M 30-74 MINUTES: ICD-10-PCS | Mod: ,,, | Performed by: INTERNAL MEDICINE

## 2020-03-23 PROCEDURE — 99291 CRITICAL CARE FIRST HOUR: CPT | Mod: ,,, | Performed by: INTERNAL MEDICINE

## 2020-03-23 PROCEDURE — 85025 COMPLETE CBC W/AUTO DIFF WBC: CPT

## 2020-03-23 PROCEDURE — 20000000 HC ICU ROOM

## 2020-03-23 RX ORDER — GLUCAGON 1 MG
1 KIT INJECTION
Status: DISCONTINUED | OUTPATIENT
Start: 2020-03-23 | End: 2020-03-25

## 2020-03-23 RX ORDER — ENOXAPARIN SODIUM 100 MG/ML
40 INJECTION SUBCUTANEOUS NIGHTLY
Status: DISCONTINUED | OUTPATIENT
Start: 2020-03-23 | End: 2020-03-30 | Stop reason: HOSPADM

## 2020-03-23 RX ORDER — DEXTROSE MONOHYDRATE 100 MG/ML
INJECTION, SOLUTION INTRAVENOUS CONTINUOUS PRN
Status: DISCONTINUED | OUTPATIENT
Start: 2020-03-23 | End: 2020-03-24

## 2020-03-23 RX ADMIN — PROPOFOL 40 MCG/KG/MIN: 10 INJECTION, EMULSION INTRAVENOUS at 08:03

## 2020-03-23 RX ADMIN — CEFAZOLIN 2 G: 1 INJECTION, POWDER, FOR SOLUTION INTRAMUSCULAR; INTRAVENOUS at 08:03

## 2020-03-23 RX ADMIN — HEPARIN SODIUM 5000 UNITS: 5000 INJECTION, SOLUTION INTRAVENOUS; SUBCUTANEOUS at 05:03

## 2020-03-23 RX ADMIN — ENOXAPARIN SODIUM 40 MG: 100 INJECTION SUBCUTANEOUS at 08:03

## 2020-03-23 RX ADMIN — PROPOFOL 40 MCG/KG/MIN: 10 INJECTION, EMULSION INTRAVENOUS at 02:03

## 2020-03-23 RX ADMIN — HEPARIN SODIUM 5000 UNITS: 5000 INJECTION, SOLUTION INTRAVENOUS; SUBCUTANEOUS at 03:03

## 2020-03-23 RX ADMIN — PROPOFOL 35 MCG/KG/MIN: 10 INJECTION, EMULSION INTRAVENOUS at 07:03

## 2020-03-23 RX ADMIN — CEFAZOLIN 2 G: 1 INJECTION, POWDER, FOR SOLUTION INTRAMUSCULAR; INTRAVENOUS at 03:03

## 2020-03-23 RX ADMIN — METHYLPREDNISOLONE SODIUM SUCCINATE 40 MG: 125 INJECTION, POWDER, FOR SOLUTION INTRAMUSCULAR; INTRAVENOUS at 08:03

## 2020-03-23 RX ADMIN — PROPOFOL 40 MCG/KG/MIN: 10 INJECTION, EMULSION INTRAVENOUS at 11:03

## 2020-03-23 RX ADMIN — INSULIN DETEMIR 10 UNITS: 100 INJECTION, SOLUTION SUBCUTANEOUS at 03:03

## 2020-03-23 RX ADMIN — FAMOTIDINE 20 MG: 10 INJECTION INTRAVENOUS at 08:03

## 2020-03-23 RX ADMIN — PROPOFOL 45 MCG/KG/MIN: 10 INJECTION, EMULSION INTRAVENOUS at 04:03

## 2020-03-23 RX ADMIN — AZITHROMYCIN 250 MG: 250 TABLET, FILM COATED ORAL at 08:03

## 2020-03-23 RX ADMIN — HYDROXYCHLOROQUINE SULFATE 200 MG: 200 TABLET, FILM COATED ORAL at 08:03

## 2020-03-23 RX ADMIN — INSULIN ASPART 1 UNITS: 100 INJECTION, SOLUTION INTRAVENOUS; SUBCUTANEOUS at 11:03

## 2020-03-23 NOTE — PROGRESS NOTES
Rechecked patient's blood sugar at 2349 and it was 368. Insulin gtt currently infusing at 1.5 units/hr. Informed Dr. Fracisco Rivers with [facility] of latest blood sugar. Advised to titrate insulin gtt up to 3 units/hr.

## 2020-03-23 NOTE — SUBJECTIVE & OBJECTIVE
Interval History/Significant Events:   Insulin drip stopped yesterday due to BG of 29. Restarted after BG rebounded to 300s. No anion gap. Vent requirements improving daily     Review of Systems   Unable to perform ROS: Intubated     Objective:     Vital Signs (Most Recent):  Temp: 97.5 °F (36.4 °C) (03/23/20 0917)  Pulse: 63 (03/23/20 0917)  Resp: 18 (03/23/20 0917)  BP: 103/67 (03/23/20 0917)  SpO2: 96 % (03/23/20 0917) Vital Signs (24h Range):  Temp:  [96.3 °F (35.7 °C)-97.6 °F (36.4 °C)] 97.5 °F (36.4 °C)  Pulse:  [62-94] 63  Resp:  [18-25] 18  SpO2:  [96 %-100 %] 96 %  BP: ()/(58-87) 103/67  Arterial Line BP: (103-156)/(53-74) 104/53   Weight: 96.8 kg (213 lb 6.5 oz)  Body mass index is 28.16 kg/m².      Intake/Output Summary (Last 24 hours) at 3/23/2020 1113  Last data filed at 3/23/2020 0920  Gross per 24 hour   Intake 854.51 ml   Output 1635 ml   Net -780.49 ml       Physical Exam   Constitutional:   Please attending Attestation. Physical exam not performed in order to minimized that chance of COVID-19. Information gather from nursing assessments.      HENT:   Head: Normocephalic and atraumatic.   Cardiovascular: Normal rate and regular rhythm.   Pulmonary/Chest:   Please see vent settings       Vents:  Vent Mode: A/C (03/23/20 0832)  Ventilator Initiated: Yes (03/20/20 5375)  Set Rate: 18 BPM (03/23/20 0832)  Vt Set: 480 mL (03/23/20 0832)  Pressure Support: 0 cmH20 (03/23/20 0832)  PEEP/CPAP: 8 cmH20 (03/23/20 0832)  Oxygen Concentration (%): 30 (03/23/20 0917)  Peak Airway Pressure: 22 cmH2O (03/23/20 0832)  Plateau Pressure: 0 cmH20 (03/23/20 0832)  Total Ve: 8.64 mL (03/23/20 0832)  F/VT Ratio<105 (RSBI): (!) 37.5 (03/23/20 0832)  Lines/Drains/Airways     Central Venous Catheter Line                 Hemodialysis Catheter 03/20/20 1142 2 days    Permacath 03/20/20 1142 2 days    Trialysis (Dialysis) Catheter 03/21/20 0020 right internal jugular 2 days          Drain                 NG/OG Tube  03/21/20 0147 orogastric 14 Fr. Center mouth 2 days         Urethral Catheter 03/21/20 0147 Straight-tip 16 Fr. 2 days          Airway                 Airway - Non-Surgical 03/20/20 2310 Endotracheal Tube 2 days          Arterial Line                 Arterial Line 03/20/20 2359 Left Radial 2 days          Peripheral Intravenous Line                 Peripheral IV - Single Lumen 03/14/20 0303 20 G Anterior;Right Forearm 9 days         Peripheral IV - Single Lumen 03/21/20 0144 20 G Right Hand 2 days         Peripheral IV - Single Lumen 03/21/20 0145 18 G Right Wrist 2 days              Significant Labs:    CBC/Anemia Profile:  Recent Labs   Lab 03/22/20  0400 03/23/20  0411   WBC 8.23 9.02   HGB 9.5* 8.8*   HCT 31.4* 29.3*    355*   MCV 96 94   RDW 13.1 13.2        Chemistries:  Recent Labs   Lab 03/22/20  0400  03/22/20  2325 03/23/20  0411 03/23/20  0943     138   < > 137 140 143   K 4.2  4.2   < > 4.5 4.2 3.6   CL 99  99   < > 101 102 104   CO2 22*  22*   < > 24 25 28   BUN 32*  32*   < > 44* 46* 46*   CREATININE 3.0*  3.0*   < > 3.1* 3.1* 2.9*   CALCIUM 8.2*  8.2*   < > 8.0* 8.1* 8.0*   ALBUMIN 1.5*  1.5*   < > 1.5* 1.6* 1.5*   PROT 7.5  --   --  7.1  --    BILITOT 0.2  --   --  0.1  --    ALKPHOS 118  --   --  103  --    ALT <5*  --   --  <5*  --    AST 22  --   --  13  --    MG 2.3  --   --  2.6  --    PHOS 5.8*  5.8*   < > 4.9* 4.0 3.4    < > = values in this interval not displayed.         Significant Imaging:  I have reviewed all pertinent imaging results/findings within the past 24 hours.

## 2020-03-23 NOTE — PROGRESS NOTES
Patient's 2000 blood sugar was 355. Insulin gtt currently infusing @ 1unit/hr. Spoke with Dr. Fracisco Rivers with [facility]. Advised to titrate insulin gtt to 1.5units/hr. Will reassess at 0000 glucose check.

## 2020-03-23 NOTE — ASSESSMENT & PLAN NOTE
Acute Hypoxic Respiratory Failure    -- DDx including: aspiration event vs multifocal bacterial pneumonia.   -- Hypoxemia may be ARDS related to underlying sepsis from uncontrolled osteomyelitis vs COVID  -- COVID-19 testing pending  - Infection Control notified  - Isolation: Airborne and Droplet Precautions  - Diagnostics (leukopenia, hyponatremia, hyperferritinemia, elevated troponin, elevated d-dimer, age, and comorbidities are significant predictors of poor clinical outcome)              - CBC no leukopenia or lymphonenia              - CMP with hyponatremia; now hypernatremic              - d-dimer, ferritin, troponin, CRP, LDH, Procalcitonin              - ECG non-ischemic              - rapid Flu neg              - RIP neg              - Legionella antigen              - Blood culture x2 NGTD              - CXR with bibasilar patchy infiltrates on arrival. Repeat s/p tube placement              - UA and culture neg    Plan:              - Intubated, Sedated - Propofol/Fentanyl - wean as tolerated              - Telemetry & Continuous Pulse Ox   - Follow up COVID result              - Starting on Methylprednisolone 40mg daily (3/26 stop)              - Start on hydroxychloroquine 5 day course (3/26 stop)               - Empiric antibiotics for CAP/HCAP on arrival with zosyn, final day is 3/17, completed.    - Already on Cefazolin. Added Azithromycin for synergistic effect for possible COVID    - ID following for osteo

## 2020-03-23 NOTE — ASSESSMENT & PLAN NOTE
A1C 12. Home meds insulin detemir 25U qhs, aspart 10 tidwm, victoza, and metformin  - Insulin gtt restarted. Will continue to monitor and transition when needed  - diabetic diet when extubated

## 2020-03-23 NOTE — PROGRESS NOTES
Spoke with Dr. Fracisco Rivers regarding patient's latest blood sugar of 318. Advised to keep insulin gtt infusing @ 3 units/hr and cover with sliding scale.

## 2020-03-23 NOTE — PROGRESS NOTES
"[facility]  Critical Care Medicine  Progress Note    Patient Name: Indio Ryder Jr.  MRN: [id number]  Admission Date: 3/10/2020  Hospital Length of Stay: 13 days  Code Status: Full Code  Attending Provider: Deborah Aleman DO  Primary Care Provider: Lorena Thakur MD   Principal Problem: Suspected Covid-19 Virus Infection    Subjective:     HPI:  Per  HPI    "Mr. Ryder is 50 y/o with a history of poorly controlled DM2 ( last Ha1c 8.4 ) , DKA with coma, diabetic neuropathy, diabatic retinopathy and diabetic foot with multiple amputation who presents to ED with chief complaint of chills associated with nausea and vomiting for one day. He sates that yesterday he started to feel ill and he has been having chills associated with nauseas and vomiting. He also endorses poor appetite with decreased oral intake for the last week. Denies fever, abdominal pain, feet or leg pain, leg swelling, CP, SOB, dysuria. He a history of poorly control diabetes with amputation of his right big and 5th toes. He undergone left big toe detriment and imputation on 7/14/2019. He reports not feeling well for the last couple of weeks and he felt about to pass out on Mardi gras and his BP was in 90/50. He was seen by podiatry last  Thursday when he had his wound opened and he was prescribed doxycycline for 10 days.      In ED, he was afebrile, vitally stable. Lab, BS: 382, B-hydroxybutyrate 3.6, HCO3 22,  anion gap 18. WBC: 6.9, lactate: 1.2  X-ray of right food shows soft tissue edema concerning for osteomyelitis. He received single dose of vanc/zosyn and 2L IVF.     Overview/Hospital Course:  Admitted to . Started on insulin gtt and IVF infusion. Pt is currently treated with doxycyline for wound in left foot. ID and podiatry consulted. MRI with acute osteomyelitis of 4th metatarsal head and neck of left proximal phalanx. ID recommended cefazolin and podiatry plan for for amputation. Pt is refusing amputation " "but agree to have agressive washout if he keep spiking fever. Pt transitioned to subQ insulin and tolerating PO. Blood ux grwe Staph coagulase negative, TTE done and shows normal valves with no vegetations. Pt had drop in his SpO2 to 85 with need for 5L oxygen. Respiratory cx with many GPC and CXR shows b/l patchy infiltration. Pt still spiking fever and in acute respiratory failure. Concern for COVID-19, test is sent. "     Rapid response called on pt as oxygen requirements are slowly increasing. Pt with oxygen saturations of 88% on 8L NC. Pt transferred to CMICU and intubated the unit.     Hospital/ICU Course:  Patient initially admitted to hospital medicine for DKA and left foot osteomyelitis, on the night of 3/20 patient became hypoxic and requiring ICU admission. Patient suspected to have COVID, lab pending. Patient was intubated started on steroid, hydroxychlorquine, and Azithromycin. Patient continue to get his left osteo treated with cefazolin - per ID recs. Overall, improving on vent with decreasing oxygen requirements. Will continue to wean. BGs sill in 300s but no AG. Will continue with insulin drip and attempt to transition off today.     Interval History/Significant Events:   Insulin drip stopped yesterday due to BG of 29. Restarted after BG rebounded to 300s. No anion gap. Vent requirements improving daily     Review of Systems   Unable to perform ROS: Intubated     Objective:     Vital Signs (Most Recent):  Temp: 97.5 °F (36.4 °C) (03/23/20 0917)  Pulse: 63 (03/23/20 0917)  Resp: 18 (03/23/20 0917)  BP: 103/67 (03/23/20 0917)  SpO2: 96 % (03/23/20 0917) Vital Signs (24h Range):  Temp:  [96.3 °F (35.7 °C)-97.6 °F (36.4 °C)] 97.5 °F (36.4 °C)  Pulse:  [62-94] 63  Resp:  [18-25] 18  SpO2:  [96 %-100 %] 96 %  BP: ()/(58-87) 103/67  Arterial Line BP: (103-156)/(53-74) 104/53   Weight: 96.8 kg (213 lb 6.5 oz)  Body mass index is 28.16 kg/m².      Intake/Output Summary (Last 24 hours) at 3/23/2020 " 1113  Last data filed at 3/23/2020 0920  Gross per 24 hour   Intake 854.51 ml   Output 1635 ml   Net -780.49 ml       Physical Exam   Constitutional:   Please attending Attestation. Physical exam not performed in order to minimized that chance of COVID-19. Information gather from nursing assessments.      HENT:   Head: Normocephalic and atraumatic.   Cardiovascular: Normal rate and regular rhythm.   Pulmonary/Chest:   Please see vent settings       Vents:  Vent Mode: A/C (03/23/20 0832)  Ventilator Initiated: Yes (03/20/20 2359)  Set Rate: 18 BPM (03/23/20 0832)  Vt Set: 480 mL (03/23/20 0832)  Pressure Support: 0 cmH20 (03/23/20 0832)  PEEP/CPAP: 8 cmH20 (03/23/20 0832)  Oxygen Concentration (%): 30 (03/23/20 0917)  Peak Airway Pressure: 22 cmH2O (03/23/20 0832)  Plateau Pressure: 0 cmH20 (03/23/20 0832)  Total Ve: 8.64 mL (03/23/20 0832)  F/VT Ratio<105 (RSBI): (!) 37.5 (03/23/20 0832)  Lines/Drains/Airways     Central Venous Catheter Line                 Hemodialysis Catheter 03/20/20 1142 2 days    Permacath 03/20/20 1142 2 days    Trialysis (Dialysis) Catheter 03/21/20 0020 right internal jugular 2 days          Drain                 NG/OG Tube 03/21/20 0147 orogastric 14 Fr. Center mouth 2 days         Urethral Catheter 03/21/20 0147 Straight-tip 16 Fr. 2 days          Airway                 Airway - Non-Surgical 03/20/20 2310 Endotracheal Tube 2 days          Arterial Line                 Arterial Line 03/20/20 2359 Left Radial 2 days          Peripheral Intravenous Line                 Peripheral IV - Single Lumen 03/14/20 0303 20 G Anterior;Right Forearm 9 days         Peripheral IV - Single Lumen 03/21/20 0144 20 G Right Hand 2 days         Peripheral IV - Single Lumen 03/21/20 0145 18 G Right Wrist 2 days              Significant Labs:    CBC/Anemia Profile:  Recent Labs   Lab 03/22/20  0400 03/23/20  0411   WBC 8.23 9.02   HGB 9.5* 8.8*   HCT 31.4* 29.3*    355*   MCV 96 94   RDW 13.1 13.2         Chemistries:  Recent Labs   Lab 03/22/20  0400  03/22/20  2325 03/23/20  0411 03/23/20  0943     138   < > 137 140 143   K 4.2  4.2   < > 4.5 4.2 3.6   CL 99  99   < > 101 102 104   CO2 22*  22*   < > 24 25 28   BUN 32*  32*   < > 44* 46* 46*   CREATININE 3.0*  3.0*   < > 3.1* 3.1* 2.9*   CALCIUM 8.2*  8.2*   < > 8.0* 8.1* 8.0*   ALBUMIN 1.5*  1.5*   < > 1.5* 1.6* 1.5*   PROT 7.5  --   --  7.1  --    BILITOT 0.2  --   --  0.1  --    ALKPHOS 118  --   --  103  --    ALT <5*  --   --  <5*  --    AST 22  --   --  13  --    MG 2.3  --   --  2.6  --    PHOS 5.8*  5.8*   < > 4.9* 4.0 3.4    < > = values in this interval not displayed.         Significant Imaging:  I have reviewed all pertinent imaging results/findings within the past 24 hours.      ABG  No results for input(s): PH, PO2, PCO2, HCO3, BE in the last 168 hours.  Assessment/Plan:     Ophtho  Uncontrolled type 2 diabetes mellitus  A1C 12. Home meds insulin detemir 25U qhs, aspart 10 tidwm, victoza, and metformin  - Insulin gtt restarted. Will continue to monitor and transition when needed  - diabetic diet when extubated    Cardiac/Vascular  Mixed hyperlipidemia  - Continue statin    Renal/  TAHIR (acute kidney injury)  Patient's Cr elevated from his baseline. TAHIR most likely due to Pre-renal vs AIN from zosyn vs. vanc toxicity vs COVID induced nephropathy  -- urinalysis/urine electrolytes consistent with intrarenal disease  -- Making good urine   -- Strict I/O for now and low threshold for Nephrology   -- Renal function panels q8  -- electrolyte replacement  -- Avoid nephrotoxic agents  -- renally dose medications    ID  Osteomyelitis of left foot  -- Podiatry and ID following  -- s/p iv zosyn. Stopped Zosyn   -- On cefazolin per ID (MSSA), pharm D for dosing  -- will need outpatient cefazolin once stable    Oncology  Anemia  Hgb stable     Other  * Suspected Covid-19 Virus Infection  Acute Hypoxic Respiratory Failure    -- DDx including:  aspiration event vs multifocal bacterial pneumonia.   -- Hypoxemia may be ARDS related to underlying sepsis from uncontrolled osteomyelitis vs COVID  -- COVID-19 testing pending  - Infection Control notified  - Isolation: Airborne and Droplet Precautions  - Diagnostics (leukopenia, hyponatremia, hyperferritinemia, elevated troponin, elevated d-dimer, age, and comorbidities are significant predictors of poor clinical outcome)              - CBC no leukopenia or lymphonenia              - CMP with hyponatremia; now hypernatremic              - d-dimer, ferritin, troponin, CRP, LDH, Procalcitonin              - ECG non-ischemic              - rapid Flu neg              - RIP neg              - Legionella antigen              - Blood culture x2 NGTD              - CXR with bibasilar patchy infiltrates on arrival. Repeat s/p tube placement              - UA and culture neg    Plan:              - Intubated, Sedated - Propofol/Fentanyl - wean as tolerated              - Telemetry & Continuous Pulse Ox   - Follow up COVID result              - Starting on Methylprednisolone 40mg daily (3/26 stop)              - Start on hydroxychloroquine 5 day course (3/26 stop)               - Empiric antibiotics for CAP/HCAP on arrival with zosyn, final day is 3/17, completed.    - Already on Cefazolin. Added Azithromycin for synergistic effect for possible COVID    - ID following for osteo        Critical secondary to Patient has a condition that poses threat to life and bodily function: Severe Respiratory Distress      Critical care was time spent personally by me on the following activities: development of treatment plan with patient or surrogate and bedside caregivers, discussions with consultants, evaluation of patient's response to treatment, examination of patient, ordering and performing treatments and interventions, ordering and review of laboratory studies, ordering and review of radiographic studies, pulse oximetry,  re-evaluation of patient's condition. This critical care time did not overlap with that of any other provider or involve time for any procedures.     Mumtaz Saldana MD  Critical Care Medicine  [facility]

## 2020-03-24 LAB
ALBUMIN SERPL BCP-MCNC: 1.7 G/DL (ref 3.5–5.2)
ALBUMIN SERPL BCP-MCNC: 1.9 G/DL (ref 3.5–5.2)
ALP SERPL-CCNC: 108 U/L (ref 55–135)
ALT SERPL W/O P-5'-P-CCNC: <5 U/L (ref 10–44)
ANION GAP SERPL CALC-SCNC: 10 MMOL/L (ref 8–16)
ANION GAP SERPL CALC-SCNC: 14 MMOL/L (ref 8–16)
ANION GAP SERPL CALC-SCNC: 14 MMOL/L (ref 8–16)
ANION GAP SERPL CALC-SCNC: 15 MMOL/L (ref 8–16)
AST SERPL-CCNC: 15 U/L (ref 10–40)
BACTERIA BLD CULT: ABNORMAL
BASOPHILS # BLD AUTO: 0.01 K/UL (ref 0–0.2)
BASOPHILS NFR BLD: 0.1 % (ref 0–1.9)
BILIRUB SERPL-MCNC: 0.1 MG/DL (ref 0.1–1)
BUN SERPL-MCNC: 48 MG/DL (ref 6–20)
BUN SERPL-MCNC: 49 MG/DL (ref 6–20)
BUN SERPL-MCNC: 50 MG/DL (ref 6–20)
BUN SERPL-MCNC: 51 MG/DL (ref 6–20)
CALCIUM SERPL-MCNC: 8.3 MG/DL (ref 8.7–10.5)
CALCIUM SERPL-MCNC: 8.6 MG/DL (ref 8.7–10.5)
CALCIUM SERPL-MCNC: 8.8 MG/DL (ref 8.7–10.5)
CALCIUM SERPL-MCNC: 8.9 MG/DL (ref 8.7–10.5)
CHLORIDE SERPL-SCNC: 104 MMOL/L (ref 95–110)
CHLORIDE SERPL-SCNC: 105 MMOL/L (ref 95–110)
CHLORIDE SERPL-SCNC: 106 MMOL/L (ref 95–110)
CHLORIDE SERPL-SCNC: 106 MMOL/L (ref 95–110)
CO2 SERPL-SCNC: 21 MMOL/L (ref 23–29)
CO2 SERPL-SCNC: 22 MMOL/L (ref 23–29)
CO2 SERPL-SCNC: 23 MMOL/L (ref 23–29)
CO2 SERPL-SCNC: 28 MMOL/L (ref 23–29)
CREAT SERPL-MCNC: 2.8 MG/DL (ref 0.5–1.4)
CREAT SERPL-MCNC: 2.9 MG/DL (ref 0.5–1.4)
CRP SERPL-MCNC: 50.1 MG/L (ref 0–8.2)
D DIMER PPP IA.FEU-MCNC: 2.83 MG/L FEU
DIFFERENTIAL METHOD: ABNORMAL
EOSINOPHIL # BLD AUTO: 0 K/UL (ref 0–0.5)
EOSINOPHIL NFR BLD: 0 % (ref 0–8)
ERYTHROCYTE [DISTWIDTH] IN BLOOD BY AUTOMATED COUNT: 13.2 % (ref 11.5–14.5)
EST. GFR  (AFRICAN AMERICAN): 27.7 ML/MIN/1.73 M^2
EST. GFR  (AFRICAN AMERICAN): 28.9 ML/MIN/1.73 M^2
EST. GFR  (NON AFRICAN AMERICAN): 23.9 ML/MIN/1.73 M^2
EST. GFR  (NON AFRICAN AMERICAN): 25 ML/MIN/1.73 M^2
GLUCOSE SERPL-MCNC: 196 MG/DL (ref 70–110)
GLUCOSE SERPL-MCNC: 219 MG/DL (ref 70–110)
GLUCOSE SERPL-MCNC: 220 MG/DL (ref 70–110)
GLUCOSE SERPL-MCNC: 344 MG/DL (ref 70–110)
HCT VFR BLD AUTO: 32.3 % (ref 40–54)
HGB BLD-MCNC: 9.7 G/DL (ref 14–18)
IMM GRANULOCYTES # BLD AUTO: 0.09 K/UL (ref 0–0.04)
IMM GRANULOCYTES NFR BLD AUTO: 1 % (ref 0–0.5)
LYMPHOCYTES # BLD AUTO: 1.1 K/UL (ref 1–4.8)
LYMPHOCYTES NFR BLD: 11.6 % (ref 18–48)
MAGNESIUM SERPL-MCNC: 2.5 MG/DL (ref 1.6–2.6)
MCH RBC QN AUTO: 29 PG (ref 27–31)
MCHC RBC AUTO-ENTMCNC: 30 G/DL (ref 32–36)
MCV RBC AUTO: 97 FL (ref 82–98)
MONOCYTES # BLD AUTO: 0.7 K/UL (ref 0.3–1)
MONOCYTES NFR BLD: 7.9 % (ref 4–15)
NEUTROPHILS # BLD AUTO: 7.3 K/UL (ref 1.8–7.7)
NEUTROPHILS NFR BLD: 79.4 % (ref 38–73)
NRBC BLD-RTO: 0 /100 WBC
PHOSPHATE SERPL-MCNC: 3.2 MG/DL (ref 2.7–4.5)
PHOSPHATE SERPL-MCNC: 3.7 MG/DL (ref 2.7–4.5)
PHOSPHATE SERPL-MCNC: 3.9 MG/DL (ref 2.7–4.5)
PHOSPHATE SERPL-MCNC: 4.1 MG/DL (ref 2.7–4.5)
PLATELET # BLD AUTO: 373 K/UL (ref 150–350)
PMV BLD AUTO: 10.5 FL (ref 9.2–12.9)
POCT GLUCOSE: 199 MG/DL (ref 70–110)
POCT GLUCOSE: 207 MG/DL (ref 70–110)
POCT GLUCOSE: 231 MG/DL (ref 70–110)
POCT GLUCOSE: 293 MG/DL (ref 70–110)
POCT GLUCOSE: 328 MG/DL (ref 70–110)
POCT GLUCOSE: 342 MG/DL (ref 70–110)
POTASSIUM SERPL-SCNC: 4.3 MMOL/L (ref 3.5–5.1)
POTASSIUM SERPL-SCNC: 4.4 MMOL/L (ref 3.5–5.1)
POTASSIUM SERPL-SCNC: 4.4 MMOL/L (ref 3.5–5.1)
POTASSIUM SERPL-SCNC: 4.6 MMOL/L (ref 3.5–5.1)
PROT SERPL-MCNC: 7.4 G/DL (ref 6–8.4)
RBC # BLD AUTO: 3.34 M/UL (ref 4.6–6.2)
SODIUM SERPL-SCNC: 140 MMOL/L (ref 136–145)
SODIUM SERPL-SCNC: 141 MMOL/L (ref 136–145)
SODIUM SERPL-SCNC: 143 MMOL/L (ref 136–145)
SODIUM SERPL-SCNC: 144 MMOL/L (ref 136–145)
TRIGL SERPL-MCNC: 188 MG/DL (ref 30–150)
WBC # BLD AUTO: 9.15 K/UL (ref 3.9–12.7)

## 2020-03-24 PROCEDURE — 25000003 PHARM REV CODE 250: Performed by: STUDENT IN AN ORGANIZED HEALTH CARE EDUCATION/TRAINING PROGRAM

## 2020-03-24 PROCEDURE — 80053 COMPREHEN METABOLIC PANEL: CPT

## 2020-03-24 PROCEDURE — 86140 C-REACTIVE PROTEIN: CPT

## 2020-03-24 PROCEDURE — 99900026 HC AIRWAY MAINTENANCE (STAT)

## 2020-03-24 PROCEDURE — 27000221 HC OXYGEN, UP TO 24 HOURS

## 2020-03-24 PROCEDURE — 94003 VENT MGMT INPAT SUBQ DAY: CPT

## 2020-03-24 PROCEDURE — S0028 INJECTION, FAMOTIDINE, 20 MG: HCPCS | Performed by: STUDENT IN AN ORGANIZED HEALTH CARE EDUCATION/TRAINING PROGRAM

## 2020-03-24 PROCEDURE — 63600175 PHARM REV CODE 636 W HCPCS: Performed by: EMERGENCY MEDICINE

## 2020-03-24 PROCEDURE — 99291 CRITICAL CARE FIRST HOUR: CPT | Mod: ,,, | Performed by: INTERNAL MEDICINE

## 2020-03-24 PROCEDURE — 99900035 HC TECH TIME PER 15 MIN (STAT)

## 2020-03-24 PROCEDURE — 20000000 HC ICU ROOM

## 2020-03-24 PROCEDURE — 84100 ASSAY OF PHOSPHORUS: CPT

## 2020-03-24 PROCEDURE — 63600175 PHARM REV CODE 636 W HCPCS: Performed by: STUDENT IN AN ORGANIZED HEALTH CARE EDUCATION/TRAINING PROGRAM

## 2020-03-24 PROCEDURE — 83735 ASSAY OF MAGNESIUM: CPT

## 2020-03-24 PROCEDURE — 85379 FIBRIN DEGRADATION QUANT: CPT

## 2020-03-24 PROCEDURE — 63600175 PHARM REV CODE 636 W HCPCS: Performed by: NURSE PRACTITIONER

## 2020-03-24 PROCEDURE — 84478 ASSAY OF TRIGLYCERIDES: CPT

## 2020-03-24 PROCEDURE — 85025 COMPLETE CBC W/AUTO DIFF WBC: CPT

## 2020-03-24 PROCEDURE — 94761 N-INVAS EAR/PLS OXIMETRY MLT: CPT

## 2020-03-24 PROCEDURE — 94770 HC EXHALED C02 TEST: CPT

## 2020-03-24 PROCEDURE — 99291 PR CRITICAL CARE, E/M 30-74 MINUTES: ICD-10-PCS | Mod: ,,, | Performed by: INTERNAL MEDICINE

## 2020-03-24 PROCEDURE — 80069 RENAL FUNCTION PANEL: CPT

## 2020-03-24 RX ADMIN — INSULIN ASPART 4 UNITS: 100 INJECTION, SOLUTION INTRAVENOUS; SUBCUTANEOUS at 02:03

## 2020-03-24 RX ADMIN — Medication 60 MCG/HR: at 01:03

## 2020-03-24 RX ADMIN — INSULIN ASPART 4 UNITS: 100 INJECTION, SOLUTION INTRAVENOUS; SUBCUTANEOUS at 05:03

## 2020-03-24 RX ADMIN — CEFAZOLIN 2 G: 1 INJECTION, POWDER, FOR SOLUTION INTRAMUSCULAR; INTRAVENOUS at 12:03

## 2020-03-24 RX ADMIN — FAMOTIDINE 20 MG: 10 INJECTION INTRAVENOUS at 08:03

## 2020-03-24 RX ADMIN — CEFAZOLIN 2 G: 1 INJECTION, POWDER, FOR SOLUTION INTRAMUSCULAR; INTRAVENOUS at 05:03

## 2020-03-24 RX ADMIN — ENOXAPARIN SODIUM 40 MG: 100 INJECTION SUBCUTANEOUS at 08:03

## 2020-03-24 RX ADMIN — PROPOFOL 30 MCG/KG/MIN: 10 INJECTION, EMULSION INTRAVENOUS at 08:03

## 2020-03-24 RX ADMIN — PROPOFOL 10 MCG/KG/MIN: 10 INJECTION, EMULSION INTRAVENOUS at 07:03

## 2020-03-24 RX ADMIN — CEFAZOLIN 2 G: 1 INJECTION, POWDER, FOR SOLUTION INTRAMUSCULAR; INTRAVENOUS at 08:03

## 2020-03-24 RX ADMIN — AZITHROMYCIN 250 MG: 250 TABLET, FILM COATED ORAL at 08:03

## 2020-03-24 RX ADMIN — METHYLPREDNISOLONE SODIUM SUCCINATE 40 MG: 125 INJECTION, POWDER, FOR SOLUTION INTRAMUSCULAR; INTRAVENOUS at 08:03

## 2020-03-24 RX ADMIN — INSULIN ASPART 1 UNITS: 100 INJECTION, SOLUTION INTRAVENOUS; SUBCUTANEOUS at 08:03

## 2020-03-24 RX ADMIN — PROPOFOL 10 MCG/KG/MIN: 10 INJECTION, EMULSION INTRAVENOUS at 03:03

## 2020-03-24 RX ADMIN — INSULIN DETEMIR 10 UNITS: 100 INJECTION, SOLUTION SUBCUTANEOUS at 08:03

## 2020-03-24 RX ADMIN — HYDROXYCHLOROQUINE SULFATE 200 MG: 200 TABLET, FILM COATED ORAL at 08:03

## 2020-03-24 RX ADMIN — INSULIN ASPART 2 UNITS: 100 INJECTION, SOLUTION INTRAVENOUS; SUBCUTANEOUS at 09:03

## 2020-03-24 NOTE — ASSESSMENT & PLAN NOTE
A1C 12. Home meds insulin detemir 25U qhs, aspart 10 tidwm, victoza, and metformin  - HHS/DKA resolved  - will start 10 detemir qhs + SSI

## 2020-03-24 NOTE — SUBJECTIVE & OBJECTIVE
Interval History/Significant Events:   No acute overnight events. Please see above. Off insulin gtt, BGs stable. Vent requirements improving, currently on 30%/8 PEEP.     Review of Systems   Unable to perform ROS: Intubated     Objective:     Vital Signs (Most Recent):  Temp: 97.4 °F (36.3 °C) (03/24/20 0921)  Pulse: 91 (03/24/20 0921)  Resp: 18 (03/24/20 0921)  BP: 116/71 (03/24/20 0921)  SpO2: (!) 93 % (03/24/20 0921) Vital Signs (24h Range):  Temp:  [96.7 °F (35.9 °C)-99.2 °F (37.3 °C)] 97.4 °F (36.3 °C)  Pulse:  [60-92] 91  Resp:  [10-19] 18  SpO2:  [90 %-98 %] 93 %  BP: (100-133)/(64-81) 116/71  Arterial Line BP: ()/(53-74) 124/65   Weight: 95.4 kg (210 lb 5.1 oz)  Body mass index is 27.75 kg/m².      Intake/Output Summary (Last 24 hours) at 3/24/2020 1054  Last data filed at 3/24/2020 0900  Gross per 24 hour   Intake 1465.31 ml   Output 2015 ml   Net -549.69 ml       Physical Exam   Constitutional:   Please see attending attestation. Physical exam not performed in order to minimize COVID-19 contacts. Information gathered from nursing assessments.     HENT:   Head: Normocephalic and atraumatic.   Cardiovascular: Normal rate and regular rhythm.   Pulmonary/Chest:   Please see vent settings       Vents:  Vent Mode: A/C (03/24/20 0825)  Ventilator Initiated: Yes (03/20/20 4349)  Set Rate: 18 BPM (03/24/20 0825)  Vt Set: 480 mL (03/24/20 0825)  Pressure Support: 0 cmH20 (03/24/20 0825)  PEEP/CPAP: 8 cmH20 (03/24/20 0825)  Oxygen Concentration (%): 30 (03/24/20 0921)  Peak Airway Pressure: 26 cmH2O (03/24/20 0825)  Plateau Pressure: 0 cmH20 (03/24/20 0825)  Total Ve: 8.93 mL (03/24/20 0825)  F/VT Ratio<105 (RSBI): (!) 36.07 (03/24/20 0825)  Lines/Drains/Airways     Central Venous Catheter Line                 Hemodialysis Catheter 03/20/20 1142 3 days    Permacath 03/20/20 1142 3 days    Trialysis (Dialysis) Catheter 03/21/20 0020 right internal jugular 3 days          Drain                 NG/OG Tube 03/21/20  0147 orogastric 14 Fr. Center mouth 3 days         Urethral Catheter 03/21/20 0147 Straight-tip 16 Fr. 3 days          Airway                 Airway - Non-Surgical 03/20/20 2310 Endotracheal Tube 3 days          Arterial Line                 Arterial Line 03/20/20 2359 Left Radial 3 days          Peripheral Intravenous Line                 Peripheral IV - Single Lumen 03/14/20 0303 20 G Anterior;Right Forearm 10 days         Peripheral IV - Single Lumen 03/21/20 0144 20 G Right Hand 3 days         Peripheral IV - Single Lumen 03/21/20 0145 18 G Right Wrist 3 days              Significant Labs:    CBC/Anemia Profile:  Recent Labs   Lab 03/23/20 0411 03/24/20  0345   WBC 9.02 9.15   HGB 8.8* 9.7*   HCT 29.3* 32.3*   * 373*   MCV 94 97   RDW 13.2 13.2        Chemistries:  Recent Labs   Lab 03/23/20 0411 03/23/20 2358 03/24/20 0345 03/24/20  0926      < > 140 143 144   K 4.2   < > 4.4 4.4 4.3      < > 104 106 106   CO2 25   < > 22* 23 28   BUN 46*   < > 48* 49* 50*   CREATININE 3.1*   < > 2.8* 2.8* 2.8*   CALCIUM 8.1*   < > 8.9 8.8 8.3*   ALBUMIN 1.6*   < > 1.7* 1.7* 1.7*   PROT 7.1  --   --  7.4  --    BILITOT 0.1  --   --  0.1  --    ALKPHOS 103  --   --  108  --    ALT <5*  --   --  <5*  --    AST 13  --   --  15  --    MG 2.6  --   --  2.5  --    PHOS 4.0   < > 4.1 3.9 3.2    < > = values in this interval not displayed.       Significant Imaging:  I have reviewed all pertinent imaging results/findings within the past 24 hours.

## 2020-03-24 NOTE — PLAN OF CARE
Patient not medically stable for stepdown at this time. Patient continues to require [facility] service for:      ETT  Sedation   Pressors      CM remains available for any further patient/family needs or concerns.        03/24/20 1035   Discharge Reassessment   Assessment Type Discharge Planning Reassessment   Do you have any problems affording any of your prescribed medications? No   Discharge Plan A Home with family;Home Health   DME Needed Upon Discharge  other (see comments)  (tbd)   Anticipated Discharge Disposition Home       Needs IV antibiotics for 6 weeks, ending 4/23.    Maxim Sears RN MSN  Critical Care-   Ext. [phone]

## 2020-03-24 NOTE — ASSESSMENT & PLAN NOTE
Patient's Cr elevated from his baseline. TAHIR most likely due to Pre-renal vs AIN from zosyn vs. vanc toxicity vs COVID induced nephropathy  -- kidney function stable   -- urinalysis/urine electrolytes consistent with intrarenal disease  -- Making good urine   -- Strict I/O for now and low threshold for Nephrology   -- Avoid nephrotoxic agents  -- renally dose medications

## 2020-03-24 NOTE — PROGRESS NOTES
"[facility]  Critical Care Medicine  Progress Note    Patient Name: Indio Ryder Jr.  MRN: [id number]  Admission Date: 3/10/2020  Hospital Length of Stay: 14 days  Code Status: Full Code  Attending Provider: Deborah Aleman DO  Primary Care Provider: Lorena Thakur MD   Principal Problem: Suspected Covid-19 Virus Infection    Subjective:     HPI:  Per  HPI    "Mr. Ryder is 50 y/o with a history of poorly controlled DM2 ( last Ha1c 8.4 ) , DKA with coma, diabetic neuropathy, diabatic retinopathy and diabetic foot with multiple amputation who presents to ED with chief complaint of chills associated with nausea and vomiting for one day. He sates that yesterday he started to feel ill and he has been having chills associated with nauseas and vomiting. He also endorses poor appetite with decreased oral intake for the last week. Denies fever, abdominal pain, feet or leg pain, leg swelling, CP, SOB, dysuria. He a history of poorly control diabetes with amputation of his right big and 5th toes. He undergone left big toe detriment and imputation on 7/14/2019. He reports not feeling well for the last couple of weeks and he felt about to pass out on [place]ardi gr[place] and his BP was in 90/50. He was seen by podiatry last  Thursday when he had his wound opened and he was prescribed doxycycline for 10 days.      In ED, he was afebrile, vitally stable. Lab, BS: 382, B-hydroxybutyrate 3.6, HCO3 22,  anion gap 18. WBC: 6.9, lactate: 1.2  X-ray of right food shows soft tissue edema concerning for osteomyelitis. He received single dose of vanc/zosyn and 2L IVF.     Overview/Hospital Course:  Admitted to . Started on insulin gtt and IVF infusion. Pt is currently treated with doxycyline for wound in left foot. ID and podiatry consulted. MRI with acute osteomyelitis of 4th metatarsal head and neck of left proximal phalanx. ID recommended cefazolin and podiatry plan for for amputation. Pt is refusing amputation " "but agree to have agressive washout if he keep spiking fever. Pt transitioned to subQ insulin and tolerating PO. Blood ux grwe Staph coagulase negative, TTE done and shows normal valves with no vegetations. Pt had drop in his SpO2 to 85 with need for 5L oxygen. Respiratory cx with many GPC and CXR shows b/l patchy infiltration. Pt still spiking fever and in acute respiratory failure. Concern for COVID-19, test is sent. "     Rapid response called on pt as oxygen requirements are slowly increasing. Pt with oxygen saturations of 88% on 8L NC. Pt transferred to CMICU and intubated the unit.     Hospital/ICU Course:  Patient initially admitted to hospital medicine for DKA and left foot osteomyelitis, on the night of 3/20 patient became hypoxic and requiring ICU admission. Patient suspected to have COVID, lab pending. Patient was intubated started on steroid, hydroxychlorquine, and Azithromycin. Patient continue to get his left osteo treated with cefazolin - per ID recs. Overall, improving on vent with decreasing vent requirements. Will continue to wean sedation and possibly attempt SBT today. DKA/HHS resolved.     Interval History/Significant Events:   No acute overnight events. Please see above. Off insulin gtt, BGs stable. Vent requirements improving, currently on 30%/8 PEEP.     Review of Systems   Unable to perform ROS: Intubated     Objective:     Vital Signs (Most Recent):  Temp: 97.4 °F (36.3 °C) (03/24/20 0921)  Pulse: 91 (03/24/20 0921)  Resp: 18 (03/24/20 0921)  BP: 116/71 (03/24/20 0921)  SpO2: (!) 93 % (03/24/20 0921) Vital Signs (24h Range):  Temp:  [96.7 °F (35.9 °C)-99.2 °F (37.3 °C)] 97.4 °F (36.3 °C)  Pulse:  [60-92] 91  Resp:  [10-19] 18  SpO2:  [90 %-98 %] 93 %  BP: (100-133)/(64-81) 116/71  Arterial Line BP: ()/(53-74) 124/65   Weight: 95.4 kg (210 lb 5.1 oz)  Body mass index is 27.75 kg/m².      Intake/Output Summary (Last 24 hours) at 3/24/2020 1054  Last data filed at 3/24/2020 0900  Gross " per 24 hour   Intake 1465.31 ml   Output 2015 ml   Net -549.69 ml       Physical Exam   Constitutional:   Please see attending attestation. Physical exam not performed in order to minimize COVID-19 contacts. Information gathered from nursing assessments.     HENT:   Head: Normocephalic and atraumatic.   Cardiovascular: Normal rate and regular rhythm.   Pulmonary/Chest:   Please see vent settings       Vents:  Vent Mode: A/C (03/24/20 0825)  Ventilator Initiated: Yes (03/20/20 2359)  Set Rate: 18 BPM (03/24/20 0825)  Vt Set: 480 mL (03/24/20 0825)  Pressure Support: 0 cmH20 (03/24/20 0825)  PEEP/CPAP: 8 cmH20 (03/24/20 0825)  Oxygen Concentration (%): 30 (03/24/20 0921)  Peak Airway Pressure: 26 cmH2O (03/24/20 0825)  Plateau Pressure: 0 cmH20 (03/24/20 0825)  Total Ve: 8.93 mL (03/24/20 0825)  F/VT Ratio<105 (RSBI): (!) 36.07 (03/24/20 0825)  Lines/Drains/Airways     Central Venous Catheter Line                 Hemodialysis Catheter 03/20/20 1142 3 days    Permacath 03/20/20 1142 3 days    Trialysis (Dialysis) Catheter 03/21/20 0020 right internal jugular 3 days          Drain                 NG/OG Tube 03/21/20 0147 orogastric 14 Fr. Center mouth 3 days         Urethral Catheter 03/21/20 0147 Straight-tip 16 Fr. 3 days          Airway                 Airway - Non-Surgical 03/20/20 2310 Endotracheal Tube 3 days          Arterial Line                 Arterial Line 03/20/20 2359 Left Radial 3 days          Peripheral Intravenous Line                 Peripheral IV - Single Lumen 03/14/20 0303 20 G Anterior;Right Forearm 10 days         Peripheral IV - Single Lumen 03/21/20 0144 20 G Right Hand 3 days         Peripheral IV - Single Lumen 03/21/20 0145 18 G Right Wrist 3 days              Significant Labs:    CBC/Anemia Profile:  Recent Labs   Lab 03/23/20  0411 03/24/20  0345   WBC 9.02 9.15   HGB 8.8* 9.7*   HCT 29.3* 32.3*   * 373*   MCV 94 97   RDW 13.2 13.2        Chemistries:  Recent Labs   Lab  03/23/20  0411  03/23/20  2358 03/24/20  0345 03/24/20  0926      < > 140 143 144   K 4.2   < > 4.4 4.4 4.3      < > 104 106 106   CO2 25   < > 22* 23 28   BUN 46*   < > 48* 49* 50*   CREATININE 3.1*   < > 2.8* 2.8* 2.8*   CALCIUM 8.1*   < > 8.9 8.8 8.3*   ALBUMIN 1.6*   < > 1.7* 1.7* 1.7*   PROT 7.1  --   --  7.4  --    BILITOT 0.1  --   --  0.1  --    ALKPHOS 103  --   --  108  --    ALT <5*  --   --  <5*  --    AST 13  --   --  15  --    MG 2.6  --   --  2.5  --    PHOS 4.0   < > 4.1 3.9 3.2    < > = values in this interval not displayed.       Significant Imaging:  I have reviewed all pertinent imaging results/findings within the past 24 hours.      ABG  No results for input(s): PH, PO2, PCO2, HCO3, BE in the last 168 hours.  Assessment/Plan:     Ophtho  Uncontrolled type 2 diabetes mellitus  A1C 12. Home meds insulin detemir 25U qhs, aspart 10 tidwm, victoza, and metformin  - HHS/DKA resolved  - will start 10 detemir qhs + SSI    Cardiac/Vascular  Mixed hyperlipidemia  - Continue statin    Renal/  TAHIR (acute kidney injury)  Patient's Cr elevated from his baseline. TAHIR most likely due to Pre-renal vs AIN from zosyn vs. vanc toxicity vs COVID induced nephropathy  -- kidney function stable   -- urinalysis/urine electrolytes consistent with intrarenal disease  -- Making good urine   -- Strict I/O for now and low threshold for Nephrology   -- Avoid nephrotoxic agents  -- renally dose medications    ID  Osteomyelitis of left foot  -- Podiatry and ID following  -- s/p iv zosyn. Stopped Zosyn   -- On cefazolin per ID (MSSA), pharm D for dosing  -- will need outpatient cefazolin once stable    Other  * Suspected Covid-19 Virus Infection  Acute Hypoxic Respiratory Failure    -- DDx including: aspiration event vs multifocal bacterial pneumonia.   -- Hypoxemia may be ARDS related to underlying sepsis from uncontrolled osteomyelitis vs COVID  -- COVID-19 testing pending  - Infection Control notified  -  Isolation: Airborne and Droplet Precautions  - Diagnostics (leukopenia, hyponatremia, hyperferritinemia, elevated troponin, elevated d-dimer, age, and comorbidities are significant predictors of poor clinical outcome)              - CBC no leukopenia or lymphonenia              - CMP with hyponatremia; now hypernatremic              - d-dimer, ferritin, troponin, CRP, LDH, Procalcitonin              - ECG non-ischemic              - rapid Flu neg              - RIP neg              - Legionella antigen              - Blood culture x2 NGTD              - CXR with bibasilar patchy infiltrates on arrival. Repeat s/p tube placement              - UA and culture neg    Plan:              - Intubated, Sedated - Propofol/Fentanyl - may attempt SBT today if can wean sedation   - Vent requirements improving, currently on 30%/8 PEEP.               - Telemetry & Continuous Pulse Ox   - Follow up COVID result              - Starting on Methylprednisolone 40mg daily (3/26 stop)              - Start on hydroxychloroquine 5 day course (3/26 stop)               - Empiric antibiotics for CAP/HCAP on arrival with zosyn, final day is 3/17, completed.    - Already on Cefazolin. Added Azithromycin for synergistic effect for possible COVID    - ID following for osteo        Critical secondary to Patient has a condition that poses threat to life and bodily function: Severe Respiratory Distress      Critical care was time spent personally by me on the following activities: development of treatment plan with patient or surrogate and bedside caregivers, discussions with consultants, evaluation of patient's response to treatment, examination of patient, ordering and performing treatments and interventions, ordering and review of laboratory studies, ordering and review of radiographic studies, pulse oximetry, re-evaluation of patient's condition. This critical care time did not overlap with that of any other provider or involve time for any  procedures.     Mumtaz Saldana MD  Critical Care Medicine  [facility]

## 2020-03-24 NOTE — ASSESSMENT & PLAN NOTE
Acute Hypoxic Respiratory Failure    -- DDx including: aspiration event vs multifocal bacterial pneumonia.   -- Hypoxemia may be ARDS related to underlying sepsis from uncontrolled osteomyelitis vs COVID  -- COVID-19 testing pending  - Infection Control notified  - Isolation: Airborne and Droplet Precautions  - Diagnostics (leukopenia, hyponatremia, hyperferritinemia, elevated troponin, elevated d-dimer, age, and comorbidities are significant predictors of poor clinical outcome)              - CBC no leukopenia or lymphonenia              - CMP with hyponatremia; now hypernatremic              - d-dimer, ferritin, troponin, CRP, LDH, Procalcitonin              - ECG non-ischemic              - rapid Flu neg              - RIP neg              - Legionella antigen              - Blood culture x2 NGTD              - CXR with bibasilar patchy infiltrates on arrival. Repeat s/p tube placement              - UA and culture neg    Plan:              - Intubated, Sedated - Propofol/Fentanyl - may attempt SBT today if can wean sedation   - Vent requirements improving, currently on 30%/8 PEEP.               - Telemetry & Continuous Pulse Ox   - Follow up COVID result              - Starting on Methylprednisolone 40mg daily (3/26 stop)              - Start on hydroxychloroquine 5 day course (3/26 stop)               - Empiric antibiotics for CAP/HCAP on arrival with zosyn, final day is 3/17, completed.    - Already on Cefazolin. Added Azithromycin for synergistic effect for possible COVID    - ID following for osteo

## 2020-03-25 PROBLEM — K59.00 CONSTIPATION: Status: ACTIVE | Noted: 2020-03-25

## 2020-03-25 LAB
ALBUMIN SERPL BCP-MCNC: 1.8 G/DL (ref 3.5–5.2)
ALP SERPL-CCNC: 106 U/L (ref 55–135)
ALT SERPL W/O P-5'-P-CCNC: <5 U/L (ref 10–44)
ANION GAP SERPL CALC-SCNC: 11 MMOL/L (ref 8–16)
ANION GAP SERPL CALC-SCNC: 11 MMOL/L (ref 8–16)
ANION GAP SERPL CALC-SCNC: 12 MMOL/L (ref 8–16)
AST SERPL-CCNC: 19 U/L (ref 10–40)
BASOPHILS # BLD AUTO: 0.01 K/UL (ref 0–0.2)
BASOPHILS NFR BLD: 0.1 % (ref 0–1.9)
BILIRUB SERPL-MCNC: 0.2 MG/DL (ref 0.1–1)
BUN SERPL-MCNC: 47 MG/DL (ref 6–20)
BUN SERPL-MCNC: 48 MG/DL (ref 6–20)
BUN SERPL-MCNC: 51 MG/DL (ref 6–20)
CALCIUM SERPL-MCNC: 8.4 MG/DL (ref 8.7–10.5)
CALCIUM SERPL-MCNC: 8.5 MG/DL (ref 8.7–10.5)
CALCIUM SERPL-MCNC: 8.6 MG/DL (ref 8.7–10.5)
CHLORIDE SERPL-SCNC: 108 MMOL/L (ref 95–110)
CHLORIDE SERPL-SCNC: 110 MMOL/L (ref 95–110)
CHLORIDE SERPL-SCNC: 111 MMOL/L (ref 95–110)
CO2 SERPL-SCNC: 27 MMOL/L (ref 23–29)
CREAT SERPL-MCNC: 2.6 MG/DL (ref 0.5–1.4)
CREAT SERPL-MCNC: 2.6 MG/DL (ref 0.5–1.4)
CREAT SERPL-MCNC: 2.7 MG/DL (ref 0.5–1.4)
CRP SERPL-MCNC: 29.9 MG/L (ref 0–8.2)
D DIMER PPP IA.FEU-MCNC: 2.26 MG/L FEU
DIFFERENTIAL METHOD: ABNORMAL
EOSINOPHIL # BLD AUTO: 0 K/UL (ref 0–0.5)
EOSINOPHIL NFR BLD: 0 % (ref 0–8)
ERYTHROCYTE [DISTWIDTH] IN BLOOD BY AUTOMATED COUNT: 13.2 % (ref 11.5–14.5)
EST. GFR  (AFRICAN AMERICAN): 30.2 ML/MIN/1.73 M^2
EST. GFR  (AFRICAN AMERICAN): 31.6 ML/MIN/1.73 M^2
EST. GFR  (AFRICAN AMERICAN): 31.6 ML/MIN/1.73 M^2
EST. GFR  (NON AFRICAN AMERICAN): 26.1 ML/MIN/1.73 M^2
EST. GFR  (NON AFRICAN AMERICAN): 27.3 ML/MIN/1.73 M^2
EST. GFR  (NON AFRICAN AMERICAN): 27.3 ML/MIN/1.73 M^2
G6PD RBC-CCNT: 12.2 U/G HGB (ref 7–20.5)
G6PD RBC-CCNT: 12.3 U/G HGB (ref 7–20.5)
GLUCOSE SERPL-MCNC: 274 MG/DL (ref 70–110)
GLUCOSE SERPL-MCNC: 287 MG/DL (ref 70–110)
GLUCOSE SERPL-MCNC: 295 MG/DL (ref 70–110)
HCT VFR BLD AUTO: 30.9 % (ref 40–54)
HGB BLD-MCNC: 9.1 G/DL (ref 14–18)
IMM GRANULOCYTES # BLD AUTO: 0.04 K/UL (ref 0–0.04)
IMM GRANULOCYTES NFR BLD AUTO: 0.4 % (ref 0–0.5)
LYMPHOCYTES # BLD AUTO: 1.4 K/UL (ref 1–4.8)
LYMPHOCYTES NFR BLD: 15.3 % (ref 18–48)
MAGNESIUM SERPL-MCNC: 2.7 MG/DL (ref 1.6–2.6)
MCH RBC QN AUTO: 28.3 PG (ref 27–31)
MCHC RBC AUTO-ENTMCNC: 29.4 G/DL (ref 32–36)
MCV RBC AUTO: 96 FL (ref 82–98)
MONOCYTES # BLD AUTO: 0.8 K/UL (ref 0.3–1)
MONOCYTES NFR BLD: 8.9 % (ref 4–15)
NEUTROPHILS # BLD AUTO: 7 K/UL (ref 1.8–7.7)
NEUTROPHILS NFR BLD: 75.3 % (ref 38–73)
NRBC BLD-RTO: 0 /100 WBC
PHOSPHATE SERPL-MCNC: 2.3 MG/DL (ref 2.7–4.5)
PHOSPHATE SERPL-MCNC: 2.8 MG/DL (ref 2.7–4.5)
PHOSPHATE SERPL-MCNC: 2.9 MG/DL (ref 2.7–4.5)
PLATELET # BLD AUTO: 351 K/UL (ref 150–350)
PMV BLD AUTO: 10.5 FL (ref 9.2–12.9)
POCT GLUCOSE: 192 MG/DL (ref 70–110)
POCT GLUCOSE: 245 MG/DL (ref 70–110)
POCT GLUCOSE: 257 MG/DL (ref 70–110)
POCT GLUCOSE: 267 MG/DL (ref 70–110)
POCT GLUCOSE: 301 MG/DL (ref 70–110)
POCT GLUCOSE: 302 MG/DL (ref 70–110)
POCT GLUCOSE: 317 MG/DL (ref 70–110)
POTASSIUM SERPL-SCNC: 4.1 MMOL/L (ref 3.5–5.1)
POTASSIUM SERPL-SCNC: 4.2 MMOL/L (ref 3.5–5.1)
POTASSIUM SERPL-SCNC: 4.5 MMOL/L (ref 3.5–5.1)
PROT SERPL-MCNC: 7.1 G/DL (ref 6–8.4)
RBC # BLD AUTO: 3.21 M/UL (ref 4.6–6.2)
SODIUM SERPL-SCNC: 146 MMOL/L (ref 136–145)
SODIUM SERPL-SCNC: 148 MMOL/L (ref 136–145)
SODIUM SERPL-SCNC: 150 MMOL/L (ref 136–145)
TRIGL SERPL-MCNC: 201 MG/DL (ref 30–150)
WBC # BLD AUTO: 9.29 K/UL (ref 3.9–12.7)

## 2020-03-25 PROCEDURE — 63600175 PHARM REV CODE 636 W HCPCS: Performed by: STUDENT IN AN ORGANIZED HEALTH CARE EDUCATION/TRAINING PROGRAM

## 2020-03-25 PROCEDURE — 84478 ASSAY OF TRIGLYCERIDES: CPT

## 2020-03-25 PROCEDURE — 63600175 PHARM REV CODE 636 W HCPCS: Performed by: EMERGENCY MEDICINE

## 2020-03-25 PROCEDURE — 80053 COMPREHEN METABOLIC PANEL: CPT

## 2020-03-25 PROCEDURE — 86140 C-REACTIVE PROTEIN: CPT

## 2020-03-25 PROCEDURE — 25000003 PHARM REV CODE 250: Performed by: STUDENT IN AN ORGANIZED HEALTH CARE EDUCATION/TRAINING PROGRAM

## 2020-03-25 PROCEDURE — 99900026 HC AIRWAY MAINTENANCE (STAT)

## 2020-03-25 PROCEDURE — 84100 ASSAY OF PHOSPHORUS: CPT

## 2020-03-25 PROCEDURE — 94003 VENT MGMT INPAT SUBQ DAY: CPT

## 2020-03-25 PROCEDURE — 99291 CRITICAL CARE FIRST HOUR: CPT | Mod: ,,, | Performed by: INTERNAL MEDICINE

## 2020-03-25 PROCEDURE — S0028 INJECTION, FAMOTIDINE, 20 MG: HCPCS | Performed by: STUDENT IN AN ORGANIZED HEALTH CARE EDUCATION/TRAINING PROGRAM

## 2020-03-25 PROCEDURE — 94761 N-INVAS EAR/PLS OXIMETRY MLT: CPT

## 2020-03-25 PROCEDURE — 80069 RENAL FUNCTION PANEL: CPT

## 2020-03-25 PROCEDURE — 99291 PR CRITICAL CARE, E/M 30-74 MINUTES: ICD-10-PCS | Mod: ,,, | Performed by: INTERNAL MEDICINE

## 2020-03-25 PROCEDURE — 63600175 PHARM REV CODE 636 W HCPCS: Performed by: NURSE PRACTITIONER

## 2020-03-25 PROCEDURE — 25000003 PHARM REV CODE 250: Performed by: INTERNAL MEDICINE

## 2020-03-25 PROCEDURE — 83735 ASSAY OF MAGNESIUM: CPT

## 2020-03-25 PROCEDURE — 94770 HC EXHALED C02 TEST: CPT

## 2020-03-25 PROCEDURE — 85025 COMPLETE CBC W/AUTO DIFF WBC: CPT

## 2020-03-25 PROCEDURE — 85379 FIBRIN DEGRADATION QUANT: CPT

## 2020-03-25 PROCEDURE — 99900035 HC TECH TIME PER 15 MIN (STAT)

## 2020-03-25 PROCEDURE — 20000000 HC ICU ROOM

## 2020-03-25 PROCEDURE — 27000221 HC OXYGEN, UP TO 24 HOURS

## 2020-03-25 RX ORDER — DEXTROSE MONOHYDRATE 100 MG/ML
INJECTION, SOLUTION INTRAVENOUS CONTINUOUS PRN
Status: DISCONTINUED | OUTPATIENT
Start: 2020-03-25 | End: 2020-03-30 | Stop reason: HOSPADM

## 2020-03-25 RX ORDER — DEXMEDETOMIDINE HYDROCHLORIDE 4 UG/ML
0.2 INJECTION, SOLUTION INTRAVENOUS CONTINUOUS
Status: DISCONTINUED | OUTPATIENT
Start: 2020-03-25 | End: 2020-03-27

## 2020-03-25 RX ORDER — HYDRALAZINE HYDROCHLORIDE 20 MG/ML
10 INJECTION INTRAMUSCULAR; INTRAVENOUS ONCE
Status: COMPLETED | OUTPATIENT
Start: 2020-03-25 | End: 2020-03-25

## 2020-03-25 RX ORDER — GLUCAGON 1 MG
1 KIT INJECTION
Status: DISCONTINUED | OUTPATIENT
Start: 2020-03-25 | End: 2020-03-30 | Stop reason: HOSPADM

## 2020-03-25 RX ORDER — METOPROLOL TARTRATE 25 MG/1
25 TABLET, FILM COATED ORAL 2 TIMES DAILY
Status: DISCONTINUED | OUTPATIENT
Start: 2020-03-25 | End: 2020-03-25

## 2020-03-25 RX ORDER — POLYETHYLENE GLYCOL 3350 17 G/17G
17 POWDER, FOR SOLUTION ORAL DAILY
Status: DISCONTINUED | OUTPATIENT
Start: 2020-03-25 | End: 2020-03-30 | Stop reason: HOSPADM

## 2020-03-25 RX ORDER — INSULIN ASPART 100 [IU]/ML
1-10 INJECTION, SOLUTION INTRAVENOUS; SUBCUTANEOUS EVERY 4 HOURS PRN
Status: DISCONTINUED | OUTPATIENT
Start: 2020-03-25 | End: 2020-03-30 | Stop reason: HOSPADM

## 2020-03-25 RX ORDER — AMOXICILLIN 250 MG
1 CAPSULE ORAL 2 TIMES DAILY
Status: DISCONTINUED | OUTPATIENT
Start: 2020-03-25 | End: 2020-03-30 | Stop reason: HOSPADM

## 2020-03-25 RX ORDER — METOPROLOL TARTRATE 50 MG/1
50 TABLET ORAL 2 TIMES DAILY
Status: DISCONTINUED | OUTPATIENT
Start: 2020-03-25 | End: 2020-03-30 | Stop reason: HOSPADM

## 2020-03-25 RX ADMIN — DEXMEDETOMIDINE HYDROCHLORIDE 0.6 MCG/KG/HR: 100 INJECTION, SOLUTION, CONCENTRATE INTRAVENOUS at 02:03

## 2020-03-25 RX ADMIN — METOPROLOL TARTRATE 50 MG: 50 TABLET, FILM COATED ORAL at 08:03

## 2020-03-25 RX ADMIN — DEXMEDETOMIDINE HYDROCHLORIDE 1.4 MCG/KG/HR: 100 INJECTION, SOLUTION, CONCENTRATE INTRAVENOUS at 11:03

## 2020-03-25 RX ADMIN — POLYETHYLENE GLYCOL 3350 17 G: 17 POWDER, FOR SOLUTION ORAL at 01:03

## 2020-03-25 RX ADMIN — INSULIN ASPART 1 UNITS: 100 INJECTION, SOLUTION INTRAVENOUS; SUBCUTANEOUS at 12:03

## 2020-03-25 RX ADMIN — HYDRALAZINE HYDROCHLORIDE 10 MG: 20 INJECTION INTRAMUSCULAR; INTRAVENOUS at 07:03

## 2020-03-25 RX ADMIN — METHYLPREDNISOLONE SODIUM SUCCINATE 40 MG: 125 INJECTION, POWDER, FOR SOLUTION INTRAMUSCULAR; INTRAVENOUS at 09:03

## 2020-03-25 RX ADMIN — INSULIN ASPART 8 UNITS: 100 INJECTION, SOLUTION INTRAVENOUS; SUBCUTANEOUS at 10:03

## 2020-03-25 RX ADMIN — DEXMEDETOMIDINE HYDROCHLORIDE 1.4 MCG/KG/HR: 100 INJECTION, SOLUTION, CONCENTRATE INTRAVENOUS at 08:03

## 2020-03-25 RX ADMIN — HYDRALAZINE HYDROCHLORIDE 25 MG: 25 TABLET, FILM COATED ORAL at 05:03

## 2020-03-25 RX ADMIN — PROPOFOL 10 MCG/KG/MIN: 10 INJECTION, EMULSION INTRAVENOUS at 08:03

## 2020-03-25 RX ADMIN — METOPROLOL TARTRATE 25 MG: 25 TABLET ORAL at 09:03

## 2020-03-25 RX ADMIN — HYDRALAZINE HYDROCHLORIDE 25 MG: 25 TABLET, FILM COATED ORAL at 10:03

## 2020-03-25 RX ADMIN — DOCUSATE SODIUM - SENNOSIDES 1 TABLET: 50; 8.6 TABLET, FILM COATED ORAL at 01:03

## 2020-03-25 RX ADMIN — INSULIN ASPART 2 UNITS: 100 INJECTION, SOLUTION INTRAVENOUS; SUBCUTANEOUS at 10:03

## 2020-03-25 RX ADMIN — FAMOTIDINE 20 MG: 10 INJECTION INTRAVENOUS at 09:03

## 2020-03-25 RX ADMIN — HYDROXYCHLOROQUINE SULFATE 200 MG: 200 TABLET, FILM COATED ORAL at 08:03

## 2020-03-25 RX ADMIN — PROPOFOL 10 MCG/KG/MIN: 10 INJECTION, EMULSION INTRAVENOUS at 04:03

## 2020-03-25 RX ADMIN — INSULIN ASPART 8 UNITS: 100 INJECTION, SOLUTION INTRAVENOUS; SUBCUTANEOUS at 01:03

## 2020-03-25 RX ADMIN — DOCUSATE SODIUM - SENNOSIDES 1 TABLET: 50; 8.6 TABLET, FILM COATED ORAL at 08:03

## 2020-03-25 RX ADMIN — DEXMEDETOMIDINE HYDROCHLORIDE 0.2 MCG/KG/HR: 100 INJECTION, SOLUTION, CONCENTRATE INTRAVENOUS at 11:03

## 2020-03-25 RX ADMIN — CEFAZOLIN 2 G: 1 INJECTION, POWDER, FOR SOLUTION INTRAMUSCULAR; INTRAVENOUS at 09:03

## 2020-03-25 RX ADMIN — CEFAZOLIN 2 G: 1 INJECTION, POWDER, FOR SOLUTION INTRAMUSCULAR; INTRAVENOUS at 05:03

## 2020-03-25 RX ADMIN — HYDROXYCHLOROQUINE SULFATE 200 MG: 200 TABLET, FILM COATED ORAL at 09:03

## 2020-03-25 RX ADMIN — ENOXAPARIN SODIUM 40 MG: 100 INJECTION SUBCUTANEOUS at 08:03

## 2020-03-25 RX ADMIN — CEFAZOLIN 2 G: 1 INJECTION, POWDER, FOR SOLUTION INTRAMUSCULAR; INTRAVENOUS at 12:03

## 2020-03-25 RX ADMIN — INSULIN ASPART 3 UNITS: 100 INJECTION, SOLUTION INTRAVENOUS; SUBCUTANEOUS at 04:03

## 2020-03-25 RX ADMIN — INSULIN ASPART 8 UNITS: 100 INJECTION, SOLUTION INTRAVENOUS; SUBCUTANEOUS at 06:03

## 2020-03-25 RX ADMIN — DEXMEDETOMIDINE HYDROCHLORIDE 1.4 MCG/KG/HR: 100 INJECTION, SOLUTION, CONCENTRATE INTRAVENOUS at 05:03

## 2020-03-25 NOTE — PROGRESS NOTES
"[facility]  Critical Care Medicine  Progress Note    Patient Name: Indio Ryder Jr.  MRN: [id number]  Admission Date: 3/10/2020  Hospital Length of Stay: 15 days  Code Status: Full Code  Attending Provider: Deborah Aleman DO  Primary Care Provider: Lorena Thakur MD   Principal Problem: Suspected Covid-19 Virus Infection    Subjective:     HPI:  Per  HPI    "Mr. Ryder is 50 y/o with a history of poorly controlled DM2 ( last Ha1c 8.4 ) , DKA with coma, diabetic neuropathy, diabatic retinopathy and diabetic foot with multiple amputation who presents to ED with chief complaint of chills associated with nausea and vomiting for one day. He sates that yesterday he started to feel ill and he has been having chills associated with nauseas and vomiting. He also endorses poor appetite with decreased oral intake for the last week. Denies fever, abdominal pain, feet or leg pain, leg swelling, CP, SOB, dysuria. He a history of poorly control diabetes with amputation of his right big and 5th toes. He undergone left big toe detriment and imputation on 7/14/2019. He reports not feeling well for the last couple of weeks and he felt about to pass out on [place] and his BP was in 90/50. He was seen by podiatry last  Thursday when he had his wound opened and he was prescribed doxycycline for 10 days.      In ED, he was afebrile, vitally stable. Lab, BS: 382, B-hydroxybutyrate 3.6, HCO3 22,  anion gap 18. WBC: 6.9, lactate: 1.2  X-ray of right food shows soft tissue edema concerning for osteomyelitis. He received single dose of vanc/zosyn and 2L IVF.     Overview/Hospital Course:  Admitted to . Started on insulin gtt and IVF infusion. Pt is currently treated with doxycyline for wound in left foot. ID and podiatry consulted. MRI with acute osteomyelitis of 4th metatarsal head and neck of left proximal phalanx. ID recommended cefazolin and podiatry plan for for amputation. Pt is refusing amputation " "but agree to have agressive washout if he keep spiking fever. Pt transitioned to subQ insulin and tolerating PO. Blood ux grwe Staph coagulase negative, TTE done and shows normal valves with no vegetations. Pt had drop in his SpO2 to 85 with need for 5L oxygen. Respiratory cx with many GPC and CXR shows b/l patchy infiltration. Pt still spiking fever and in acute respiratory failure. Concern for COVID-19, test is sent. "     Rapid response called on pt as oxygen requirements are slowly increasing. Pt with oxygen saturations of 88% on 8L NC. Pt transferred to CMICU and intubated the unit.     Hospital/ICU Course:  Patient initially admitted to hospital medicine for DKA and left foot osteomyelitis, on the night of 3/20 patient became hypoxic and requiring ICU admission. Patient suspected to have COVID, lab pending. Patient was intubated started on steroid, hydroxychlorquine, and Azithromycin. Patient continue to get his left osteo treated with cefazolin - per ID recs. Overall, improving on vent with decreasing vent requirements. DKA/HHS resolved. Attempting to wean propofol/fentanyl to assess SBT; however, not tolerating it well. Started precedex     Interval History/Significant Events:   Patient more alert after decreasing sedation but unable to completley wean off. Please see above. On same vent setting of 30L and 8 PEEP. No BM since 3/18. Still with copious clear secretions     Review of Systems   Unable to perform ROS: Intubated     Objective:     Vital Signs (Most Recent):  Temp: 99.2 °F (37.3 °C) (03/25/20 0945)  Pulse: 94 (03/25/20 1100)  Resp: 20 (03/25/20 1100)  BP: 117/75 (03/25/20 1000)  SpO2: 98 % (03/25/20 1100) Vital Signs (24h Range):  Temp:  [97.5 °F (36.4 °C)-99.2 °F (37.3 °C)] 99.2 °F (37.3 °C)  Pulse:  [] 94  Resp:  [18-20] 20  SpO2:  [92 %-98 %] 98 %  BP: (104-137)/(65-83) 117/75  Arterial Line BP: (102-168)/(55-79) 144/78   Weight: 98.8 kg (217 lb 13 oz)  Body mass index is 28.74 " kg/m².      Intake/Output Summary (Last 24 hours) at 3/25/2020 1129  Last data filed at 3/25/2020 1100  Gross per 24 hour   Intake 1609.44 ml   Output 3150 ml   Net -1540.56 ml       Physical Exam   Constitutional:   Please see attending attestation. Physical exam not performed in order to minimize COVID-19 contacts. Information gathered from nursing assessments.     HENT:   Head: Normocephalic and atraumatic.   Cardiovascular: Normal rate and regular rhythm.   Pulmonary/Chest:   Please see vent settings       Vents:  Vent Mode: A/C (03/25/20 1100)  Ventilator Initiated: Yes (03/20/20 2359)  Set Rate: 18 BPM (03/25/20 1100)  Vt Set: 480 mL (03/25/20 1100)  Pressure Support: 0 cmH20 (03/25/20 1100)  PEEP/CPAP: 8 cmH20 (03/25/20 1100)  Oxygen Concentration (%): 30 (03/25/20 1100)  Peak Airway Pressure: 40 cmH2O (03/25/20 1100)  Plateau Pressure: 0 cmH20 (03/25/20 1100)  Total Ve: 9.58 mL (03/25/20 1100)  F/VT Ratio<105 (RSBI): (!) 36.43 (03/25/20 1100)  Lines/Drains/Airways     Central Venous Catheter Line                 Hemodialysis Catheter 03/20/20 1142 4 days    Permacath 03/20/20 1142 4 days    Trialysis (Dialysis) Catheter 03/21/20 0020 right internal jugular 4 days          Drain                 NG/OG Tube 03/21/20 0147 orogastric 14 Fr. Center mouth 4 days         Urethral Catheter 03/21/20 0147 Straight-tip 16 Fr. 4 days          Airway                 Airway - Non-Surgical 03/20/20 2310 Endotracheal Tube 4 days          Arterial Line                 Arterial Line 03/20/20 2359 Left Radial 4 days          Peripheral Intravenous Line                 Peripheral IV - Single Lumen 03/21/20 0144 20 G Right Hand 4 days         Peripheral IV - Single Lumen 03/21/20 0145 18 G Right Wrist 4 days              Significant Labs:    CBC/Anemia Profile:  Recent Labs   Lab 03/24/20  0345 03/25/20  0354   WBC 9.15 9.29   HGB 9.7* 9.1*   HCT 32.3* 30.9*   * 351*   MCV 97 96   RDW 13.2 13.2        Chemistries:  Recent  Labs   Lab 03/24/20  0345  03/24/20  2357 03/25/20  0354 03/25/20  1010      < > 146* 148* 150*   K 4.4   < > 4.5 4.2 4.1      < > 108 110 111*   CO2 23   < > 27 27 27   BUN 49*   < > 51* 47* 48*   CREATININE 2.8*   < > 2.7* 2.6* 2.6*   CALCIUM 8.8   < > 8.5* 8.4* 8.6*   ALBUMIN 1.7*   < > 1.8* 1.8* 1.8*   PROT 7.4  --   --  7.1  --    BILITOT 0.1  --   --  0.2  --    ALKPHOS 108  --   --  106  --    ALT <5*  --   --  <5*  --    AST 15  --   --  19  --    MG 2.5  --   --  2.7*  --    PHOS 3.9   < > 2.8 2.3* 2.9    < > = values in this interval not displayed.         Significant Imaging:  I have reviewed all pertinent imaging results/findings within the past 24 hours.      ABG  No results for input(s): PH, PO2, PCO2, HCO3, BE in the last 168 hours.  Assessment/Plan:     Ophtho  Uncontrolled type 2 diabetes mellitus  A1C 12. Home meds insulin detemir 25U qhs, aspart 10 tidwm, victoza, and metformin  - HHS/DKA resolved  - BGs increasing again to 300s. increased detemir to 18u qhs + moderate intensity SSI    Cardiac/Vascular  Mixed hyperlipidemia  - Continue statin    Renal/  TAHIR (acute kidney injury)  Patient's Cr elevated from his baseline. TAHIR most likely due to Pre-renal vs AIN from zosyn vs. vanc toxicity vs COVID induced nephropathy  -- kidney function slowly improving   -- urinalysis/urine electrolytes consistent with intrarenal disease  -- Making good urine   -- Strict I/O for now and low threshold for Nephrology   -- Avoid nephrotoxic agents  -- renally dose medications    ID  Osteomyelitis of left foot  -- Podiatry and ID following  -- s/p iv zosyn. Stopped Zosyn   -- On cefazolin per ID (MSSA), pharm D for dosing  -- will need outpatient cefazolin once stable    GI  Constipation  - no BM since 3/18  - will obtain KUB and start bowel regimen     Other  * Suspected Covid-19 Virus Infection  Acute Hypoxic Respiratory Failure    -- DDx including: aspiration event vs multifocal bacterial pneumonia.    -- Hypoxemia may be ARDS related to underlying sepsis from uncontrolled osteomyelitis vs COVID  -- COVID-19 testing pending  - Infection Control notified  - Isolation: Airborne and Droplet Precautions  - Diagnostics (leukopenia, hyponatremia, hyperferritinemia, elevated troponin, elevated d-dimer, age, and comorbidities are significant predictors of poor clinical outcome)              - CBC no leukopenia or lymphonenia              - CMP with hyponatremia; now hypernatremic              - d-dimer, ferritin, troponin, CRP, LDH, Procalcitonin              - ECG non-ischemic              - rapid Flu neg              - RIP neg              - Legionella antigen              - Blood culture x2 NGTD              - CXR with bibasilar patchy infiltrates on arrival. Repeat s/p tube placement              - UA and culture neg    Plan:              - Intubated, Sedated - attempting to Propofol/Fentanyl for SBT however patient not tolerating. Starting precedex    - Vent requirements improving, currently on 30%/8 PEEP.               - Telemetry & Continuous Pulse Ox   - Follow up COVID result              - Starting on Methylprednisolone 40mg daily - complete               - continue hydroxychloroquine 5 day course    - Azithromycin 1500mg total - complete               - Empiric antibiotics for CAP/HCAP on arrival with zosyn, final day is 3/17, completed.         Critical secondary to Patient has a condition that poses threat to life and bodily function: Severe Respiratory Distress      Critical care was time spent personally by me on the following activities: development of treatment plan with patient or surrogate and bedside caregivers, discussions with consultants, evaluation of patient's response to treatment, examination of patient, ordering and performing treatments and interventions, ordering and review of laboratory studies, ordering and review of radiographic studies, pulse oximetry, re-evaluation of patient's  condition. This critical care time did not overlap with that of any other provider or involve time for any procedures.     Mumtaz Saldana MD  Critical Care Medicine  [facility]

## 2020-03-25 NOTE — ASSESSMENT & PLAN NOTE
A1C 12. Home meds insulin detemir 25U qhs, aspart 10 tidwm, victoza, and metformin  - HHS/DKA resolved  - BGs increasing again to 300s. increased detemir to 18u qhs + moderate intensity SSI

## 2020-03-25 NOTE — SUBJECTIVE & OBJECTIVE
Interval History/Significant Events:   Patient more alert after decreasing sedation but unable to completley wean off. Please see above. On same vent setting of 30L and 8 PEEP. No BM since 3/18. Still with copious clear secretions     Review of Systems   Unable to perform ROS: Intubated     Objective:     Vital Signs (Most Recent):  Temp: 99.2 °F (37.3 °C) (03/25/20 0945)  Pulse: 94 (03/25/20 1100)  Resp: 20 (03/25/20 1100)  BP: 117/75 (03/25/20 1000)  SpO2: 98 % (03/25/20 1100) Vital Signs (24h Range):  Temp:  [97.5 °F (36.4 °C)-99.2 °F (37.3 °C)] 99.2 °F (37.3 °C)  Pulse:  [] 94  Resp:  [18-20] 20  SpO2:  [92 %-98 %] 98 %  BP: (104-137)/(65-83) 117/75  Arterial Line BP: (102-168)/(55-79) 144/78   Weight: 98.8 kg (217 lb 13 oz)  Body mass index is 28.74 kg/m².      Intake/Output Summary (Last 24 hours) at 3/25/2020 1129  Last data filed at 3/25/2020 1100  Gross per 24 hour   Intake 1609.44 ml   Output 3150 ml   Net -1540.56 ml       Physical Exam   Constitutional:   Please see attending attestation. Physical exam not performed in order to minimize COVID-19 contacts. Information gathered from nursing assessments.     HENT:   Head: Normocephalic and atraumatic.   Cardiovascular: Normal rate and regular rhythm.   Pulmonary/Chest:   Please see vent settings       Vents:  Vent Mode: A/C (03/25/20 1100)  Ventilator Initiated: Yes (03/20/20 7784)  Set Rate: 18 BPM (03/25/20 1100)  Vt Set: 480 mL (03/25/20 1100)  Pressure Support: 0 cmH20 (03/25/20 1100)  PEEP/CPAP: 8 cmH20 (03/25/20 1100)  Oxygen Concentration (%): 30 (03/25/20 1100)  Peak Airway Pressure: 40 cmH2O (03/25/20 1100)  Plateau Pressure: 0 cmH20 (03/25/20 1100)  Total Ve: 9.58 mL (03/25/20 1100)  F/VT Ratio<105 (RSBI): (!) 36.43 (03/25/20 1100)  Lines/Drains/Airways     Central Venous Catheter Line                 Hemodialysis Catheter 03/20/20 1142 4 days    Permacath 03/20/20 1142 4 days    Trialysis (Dialysis) Catheter 03/21/20 0020 right internal  jugular 4 days          Drain                 NG/OG Tube 03/21/20 0147 orogastric 14 Fr. Center mouth 4 days         Urethral Catheter 03/21/20 0147 Straight-tip 16 Fr. 4 days          Airway                 Airway - Non-Surgical 03/20/20 2310 Endotracheal Tube 4 days          Arterial Line                 Arterial Line 03/20/20 2359 Left Radial 4 days          Peripheral Intravenous Line                 Peripheral IV - Single Lumen 03/21/20 0144 20 G Right Hand 4 days         Peripheral IV - Single Lumen 03/21/20 0145 18 G Right Wrist 4 days              Significant Labs:    CBC/Anemia Profile:  Recent Labs   Lab 03/24/20 0345 03/25/20  0354   WBC 9.15 9.29   HGB 9.7* 9.1*   HCT 32.3* 30.9*   * 351*   MCV 97 96   RDW 13.2 13.2        Chemistries:  Recent Labs   Lab 03/24/20 0345 03/24/20 2357 03/25/20  0354 03/25/20  1010      < > 146* 148* 150*   K 4.4   < > 4.5 4.2 4.1      < > 108 110 111*   CO2 23   < > 27 27 27   BUN 49*   < > 51* 47* 48*   CREATININE 2.8*   < > 2.7* 2.6* 2.6*   CALCIUM 8.8   < > 8.5* 8.4* 8.6*   ALBUMIN 1.7*   < > 1.8* 1.8* 1.8*   PROT 7.4  --   --  7.1  --    BILITOT 0.1  --   --  0.2  --    ALKPHOS 108  --   --  106  --    ALT <5*  --   --  <5*  --    AST 15  --   --  19  --    MG 2.5  --   --  2.7*  --    PHOS 3.9   < > 2.8 2.3* 2.9    < > = values in this interval not displayed.         Significant Imaging:  I have reviewed all pertinent imaging results/findings within the past 24 hours.

## 2020-03-25 NOTE — ASSESSMENT & PLAN NOTE
Acute Hypoxic Respiratory Failure    -- DDx including: aspiration event vs multifocal bacterial pneumonia.   -- Hypoxemia may be ARDS related to underlying sepsis from uncontrolled osteomyelitis vs COVID  -- COVID-19 testing pending  - Infection Control notified  - Isolation: Airborne and Droplet Precautions  - Diagnostics (leukopenia, hyponatremia, hyperferritinemia, elevated troponin, elevated d-dimer, age, and comorbidities are significant predictors of poor clinical outcome)              - CBC no leukopenia or lymphonenia              - CMP with hyponatremia; now hypernatremic              - d-dimer, ferritin, troponin, CRP, LDH, Procalcitonin              - ECG non-ischemic              - rapid Flu neg              - RIP neg              - Legionella antigen              - Blood culture x2 NGTD              - CXR with bibasilar patchy infiltrates on arrival. Repeat s/p tube placement              - UA and culture neg    Plan:              - Intubated, Sedated - attempting to Propofol/Fentanyl for SBT however patient not tolerating. Starting precedex    - Vent requirements improving, currently on 30%/8 PEEP.               - Telemetry & Continuous Pulse Ox   - Follow up COVID result              - Starting on Methylprednisolone 40mg daily - complete               - continue hydroxychloroquine 5 day course    - Azithromycin 1500mg total - complete               - Empiric antibiotics for CAP/HCAP on arrival with zosyn, final day is 3/17, completed.

## 2020-03-25 NOTE — ASSESSMENT & PLAN NOTE
Patient's Cr elevated from his baseline. TAHIR most likely due to Pre-renal vs AIN from zosyn vs. vanc toxicity vs COVID induced nephropathy  -- kidney function slowly improving   -- urinalysis/urine electrolytes consistent with intrarenal disease  -- Making good urine   -- Strict I/O for now and low threshold for Nephrology   -- Avoid nephrotoxic agents  -- renally dose medications

## 2020-03-26 LAB
ALBUMIN SERPL BCP-MCNC: 1.9 G/DL (ref 3.5–5.2)
ALP SERPL-CCNC: 132 U/L (ref 55–135)
ALT SERPL W/O P-5'-P-CCNC: <5 U/L (ref 10–44)
ANION GAP SERPL CALC-SCNC: 11 MMOL/L (ref 8–16)
ANION GAP SERPL CALC-SCNC: 11 MMOL/L (ref 8–16)
ANION GAP SERPL CALC-SCNC: 9 MMOL/L (ref 8–16)
AST SERPL-CCNC: 19 U/L (ref 10–40)
BACTERIA BLD CULT: NORMAL
BASOPHILS # BLD AUTO: 0.01 K/UL (ref 0–0.2)
BASOPHILS NFR BLD: 0.1 % (ref 0–1.9)
BILIRUB SERPL-MCNC: 0.2 MG/DL (ref 0.1–1)
BUN SERPL-MCNC: 43 MG/DL (ref 6–20)
BUN SERPL-MCNC: 45 MG/DL (ref 6–20)
BUN SERPL-MCNC: 47 MG/DL (ref 6–20)
CALCIUM SERPL-MCNC: 9.1 MG/DL (ref 8.7–10.5)
CALCIUM SERPL-MCNC: 9.4 MG/DL (ref 8.7–10.5)
CALCIUM SERPL-MCNC: 9.5 MG/DL (ref 8.7–10.5)
CHLORIDE SERPL-SCNC: 112 MMOL/L (ref 95–110)
CHLORIDE SERPL-SCNC: 113 MMOL/L (ref 95–110)
CHLORIDE SERPL-SCNC: 116 MMOL/L (ref 95–110)
CO2 SERPL-SCNC: 26 MMOL/L (ref 23–29)
CO2 SERPL-SCNC: 27 MMOL/L (ref 23–29)
CO2 SERPL-SCNC: 30 MMOL/L (ref 23–29)
CREAT SERPL-MCNC: 2.1 MG/DL (ref 0.5–1.4)
CREAT SERPL-MCNC: 2.4 MG/DL (ref 0.5–1.4)
CREAT SERPL-MCNC: 2.5 MG/DL (ref 0.5–1.4)
CRP SERPL-MCNC: 30.3 MG/L (ref 0–8.2)
D DIMER PPP IA.FEU-MCNC: 2.48 MG/L FEU
DIFFERENTIAL METHOD: ABNORMAL
EOSINOPHIL # BLD AUTO: 0 K/UL (ref 0–0.5)
EOSINOPHIL NFR BLD: 0 % (ref 0–8)
ERYTHROCYTE [DISTWIDTH] IN BLOOD BY AUTOMATED COUNT: 13.2 % (ref 11.5–14.5)
EST. GFR  (AFRICAN AMERICAN): 33.1 ML/MIN/1.73 M^2
EST. GFR  (AFRICAN AMERICAN): 34.8 ML/MIN/1.73 M^2
EST. GFR  (AFRICAN AMERICAN): 40.9 ML/MIN/1.73 M^2
EST. GFR  (NON AFRICAN AMERICAN): 28.7 ML/MIN/1.73 M^2
EST. GFR  (NON AFRICAN AMERICAN): 30.1 ML/MIN/1.73 M^2
EST. GFR  (NON AFRICAN AMERICAN): 35.4 ML/MIN/1.73 M^2
GLUCOSE SERPL-MCNC: 175 MG/DL (ref 70–110)
GLUCOSE SERPL-MCNC: 262 MG/DL (ref 70–110)
GLUCOSE SERPL-MCNC: 288 MG/DL (ref 70–110)
HCT VFR BLD AUTO: 34.7 % (ref 40–54)
HGB BLD-MCNC: 10.1 G/DL (ref 14–18)
IMM GRANULOCYTES # BLD AUTO: 0.05 K/UL (ref 0–0.04)
IMM GRANULOCYTES NFR BLD AUTO: 0.4 % (ref 0–0.5)
LYMPHOCYTES # BLD AUTO: 1.6 K/UL (ref 1–4.8)
LYMPHOCYTES NFR BLD: 12 % (ref 18–48)
MAGNESIUM SERPL-MCNC: 2.6 MG/DL (ref 1.6–2.6)
MCH RBC QN AUTO: 28.5 PG (ref 27–31)
MCHC RBC AUTO-ENTMCNC: 29.1 G/DL (ref 32–36)
MCV RBC AUTO: 98 FL (ref 82–98)
MONOCYTES # BLD AUTO: 1 K/UL (ref 0.3–1)
MONOCYTES NFR BLD: 7.5 % (ref 4–15)
NEUTROPHILS # BLD AUTO: 10.5 K/UL (ref 1.8–7.7)
NEUTROPHILS NFR BLD: 80 % (ref 38–73)
NRBC BLD-RTO: 0 /100 WBC
PHOSPHATE SERPL-MCNC: 2.5 MG/DL (ref 2.7–4.5)
PHOSPHATE SERPL-MCNC: 2.6 MG/DL (ref 2.7–4.5)
PHOSPHATE SERPL-MCNC: 3.1 MG/DL (ref 2.7–4.5)
PLATELET # BLD AUTO: 319 K/UL (ref 150–350)
PMV BLD AUTO: 10.3 FL (ref 9.2–12.9)
POCT GLUCOSE: 173 MG/DL (ref 70–110)
POCT GLUCOSE: 198 MG/DL (ref 70–110)
POCT GLUCOSE: 242 MG/DL (ref 70–110)
POCT GLUCOSE: 247 MG/DL (ref 70–110)
POCT GLUCOSE: 281 MG/DL (ref 70–110)
POTASSIUM SERPL-SCNC: 3.9 MMOL/L (ref 3.5–5.1)
POTASSIUM SERPL-SCNC: 4.1 MMOL/L (ref 3.5–5.1)
POTASSIUM SERPL-SCNC: 4.3 MMOL/L (ref 3.5–5.1)
PROT SERPL-MCNC: 7.8 G/DL (ref 6–8.4)
RBC # BLD AUTO: 3.54 M/UL (ref 4.6–6.2)
SARS-COV-2 RNA RESP QL NAA+PROBE: DETECTED
SODIUM SERPL-SCNC: 150 MMOL/L (ref 136–145)
SODIUM SERPL-SCNC: 150 MMOL/L (ref 136–145)
SODIUM SERPL-SCNC: 155 MMOL/L (ref 136–145)
TRIGL SERPL-MCNC: 156 MG/DL (ref 30–150)
WBC # BLD AUTO: 13.05 K/UL (ref 3.9–12.7)

## 2020-03-26 PROCEDURE — 85379 FIBRIN DEGRADATION QUANT: CPT

## 2020-03-26 PROCEDURE — 27000221 HC OXYGEN, UP TO 24 HOURS

## 2020-03-26 PROCEDURE — 20000000 HC ICU ROOM

## 2020-03-26 PROCEDURE — 99900035 HC TECH TIME PER 15 MIN (STAT)

## 2020-03-26 PROCEDURE — 80053 COMPREHEN METABOLIC PANEL: CPT

## 2020-03-26 PROCEDURE — 99900026 HC AIRWAY MAINTENANCE (STAT)

## 2020-03-26 PROCEDURE — 94003 VENT MGMT INPAT SUBQ DAY: CPT

## 2020-03-26 PROCEDURE — 94150 VITAL CAPACITY TEST: CPT

## 2020-03-26 PROCEDURE — S0028 INJECTION, FAMOTIDINE, 20 MG: HCPCS | Performed by: STUDENT IN AN ORGANIZED HEALTH CARE EDUCATION/TRAINING PROGRAM

## 2020-03-26 PROCEDURE — 94761 N-INVAS EAR/PLS OXIMETRY MLT: CPT

## 2020-03-26 PROCEDURE — 63600175 PHARM REV CODE 636 W HCPCS: Performed by: NURSE PRACTITIONER

## 2020-03-26 PROCEDURE — 25000003 PHARM REV CODE 250: Performed by: STUDENT IN AN ORGANIZED HEALTH CARE EDUCATION/TRAINING PROGRAM

## 2020-03-26 PROCEDURE — 63600175 PHARM REV CODE 636 W HCPCS: Performed by: STUDENT IN AN ORGANIZED HEALTH CARE EDUCATION/TRAINING PROGRAM

## 2020-03-26 PROCEDURE — 94010 BREATHING CAPACITY TEST: CPT

## 2020-03-26 PROCEDURE — 84478 ASSAY OF TRIGLYCERIDES: CPT

## 2020-03-26 PROCEDURE — 86140 C-REACTIVE PROTEIN: CPT

## 2020-03-26 PROCEDURE — 63600175 PHARM REV CODE 636 W HCPCS: Performed by: EMERGENCY MEDICINE

## 2020-03-26 PROCEDURE — 85025 COMPLETE CBC W/AUTO DIFF WBC: CPT

## 2020-03-26 PROCEDURE — 84100 ASSAY OF PHOSPHORUS: CPT

## 2020-03-26 PROCEDURE — 94770 HC EXHALED C02 TEST: CPT

## 2020-03-26 PROCEDURE — 27200966 HC CLOSED SUCTION SYSTEM

## 2020-03-26 PROCEDURE — 83735 ASSAY OF MAGNESIUM: CPT

## 2020-03-26 PROCEDURE — 99900017 HC EXTUBATION W/PARAMETERS (STAT)

## 2020-03-26 PROCEDURE — 80069 RENAL FUNCTION PANEL: CPT | Mod: 91

## 2020-03-26 RX ORDER — HYDRALAZINE HYDROCHLORIDE 20 MG/ML
10 INJECTION INTRAMUSCULAR; INTRAVENOUS EVERY 4 HOURS PRN
Status: DISCONTINUED | OUTPATIENT
Start: 2020-03-26 | End: 2020-03-30 | Stop reason: HOSPADM

## 2020-03-26 RX ORDER — HYDRALAZINE HYDROCHLORIDE 20 MG/ML
10 INJECTION INTRAMUSCULAR; INTRAVENOUS ONCE
Status: COMPLETED | OUTPATIENT
Start: 2020-03-26 | End: 2020-03-26

## 2020-03-26 RX ADMIN — HYDRALAZINE HYDROCHLORIDE 25 MG: 25 TABLET, FILM COATED ORAL at 05:03

## 2020-03-26 RX ADMIN — DOCUSATE SODIUM - SENNOSIDES 1 TABLET: 50; 8.6 TABLET, FILM COATED ORAL at 09:03

## 2020-03-26 RX ADMIN — INSULIN ASPART 6 UNITS: 100 INJECTION, SOLUTION INTRAVENOUS; SUBCUTANEOUS at 10:03

## 2020-03-26 RX ADMIN — FAMOTIDINE 20 MG: 10 INJECTION INTRAVENOUS at 10:03

## 2020-03-26 RX ADMIN — METOPROLOL TARTRATE 50 MG: 50 TABLET, FILM COATED ORAL at 07:03

## 2020-03-26 RX ADMIN — INSULIN ASPART 4 UNITS: 100 INJECTION, SOLUTION INTRAVENOUS; SUBCUTANEOUS at 12:03

## 2020-03-26 RX ADMIN — DOCUSATE SODIUM - SENNOSIDES 1 TABLET: 50; 8.6 TABLET, FILM COATED ORAL at 10:03

## 2020-03-26 RX ADMIN — POLYETHYLENE GLYCOL 3350 17 G: 17 POWDER, FOR SOLUTION ORAL at 10:03

## 2020-03-26 RX ADMIN — INSULIN DETEMIR 25 UNITS: 100 INJECTION, SOLUTION SUBCUTANEOUS at 09:03

## 2020-03-26 RX ADMIN — METOPROLOL TARTRATE 50 MG: 50 TABLET, FILM COATED ORAL at 10:03

## 2020-03-26 RX ADMIN — CEFAZOLIN 2 G: 1 INJECTION, POWDER, FOR SOLUTION INTRAMUSCULAR; INTRAVENOUS at 05:03

## 2020-03-26 RX ADMIN — HYDRALAZINE HYDROCHLORIDE 10 MG: 20 INJECTION INTRAMUSCULAR; INTRAVENOUS at 07:03

## 2020-03-26 RX ADMIN — INSULIN ASPART 4 UNITS: 100 INJECTION, SOLUTION INTRAVENOUS; SUBCUTANEOUS at 05:03

## 2020-03-26 RX ADMIN — DEXMEDETOMIDINE HYDROCHLORIDE 1.4 MCG/KG/HR: 100 INJECTION, SOLUTION, CONCENTRATE INTRAVENOUS at 03:03

## 2020-03-26 RX ADMIN — ENOXAPARIN SODIUM 40 MG: 100 INJECTION SUBCUTANEOUS at 09:03

## 2020-03-26 RX ADMIN — CEFAZOLIN 2 G: 1 INJECTION, POWDER, FOR SOLUTION INTRAMUSCULAR; INTRAVENOUS at 02:03

## 2020-03-26 RX ADMIN — CEFAZOLIN 2 G: 1 INJECTION, POWDER, FOR SOLUTION INTRAMUSCULAR; INTRAVENOUS at 10:03

## 2020-03-26 RX ADMIN — INSULIN ASPART 2 UNITS: 100 INJECTION, SOLUTION INTRAVENOUS; SUBCUTANEOUS at 05:03

## 2020-03-26 RX ADMIN — HYDRALAZINE HYDROCHLORIDE 10 MG: 20 INJECTION INTRAMUSCULAR; INTRAVENOUS at 02:03

## 2020-03-26 NOTE — ASSESSMENT & PLAN NOTE
Acute Hypoxic Respiratory Failure    -- DDx including: aspiration event vs multifocal bacterial pneumonia.   -- Hypoxemia may be ARDS related to underlying sepsis from uncontrolled osteomyelitis vs COVID  -- COVID-19 testing pending  - Infection Control notified  - Isolation: Airborne and Droplet Precautions  - Diagnostics (leukopenia, hyponatremia, hyperferritinemia, elevated troponin, elevated d-dimer, age, and comorbidities are significant predictors of poor clinical outcome)              - CBC no leukopenia or lymphonenia              - CMP with hyponatremia; now hypernatremic              - d-dimer, ferritin, troponin, CRP, LDH, Procalcitonin              - ECG non-ischemic              - rapid Flu neg              - RIP neg              - Legionella antigen              - Blood culture x2 NGTD              - CXR with bibasilar patchy infiltrates on arrival. Repeat s/p tube placement              - UA and culture neg    Plan:              - Intubated, Sedated - tolerated SBT yesterday, will attempt again today with possible extubation.     - Vent requirements improving, currently on 30%/5 PEEP.               - Telemetry & Continuous Pulse Ox   - Follow up COVID result              - Methylprednisolone 40mg daily - complete               - hydroxychloroquine 5 day course  - complete    - Azithromycin 1500mg total - complete               - Empiric antibiotics for CAP/HCAP on arrival with zosyn, final day is 3/17, completed.

## 2020-03-26 NOTE — SUBJECTIVE & OBJECTIVE
Interval History/Significant Events:   Please see above    Review of Systems   Unable to perform ROS: Intubated     Objective:     Vital Signs (Most Recent):  Temp: 97.7 °F (36.5 °C) (03/26/20 0800)  Pulse: 90 (03/26/20 1117)  Resp: (!) 33 (03/26/20 1117)  BP: 135/69 (03/26/20 1010)  SpO2: 99 % (03/26/20 1117) Vital Signs (24h Range):  Temp:  [97.7 °F (36.5 °C)-99 °F (37.2 °C)] 97.7 °F (36.5 °C)  Pulse:  [] 90  Resp:  [18-33] 33  SpO2:  [94 %-99 %] 99 %  BP: (108-192)/(63-99) 135/69  Arterial Line BP: (107-200)/(54-90) 134/64   Weight: 98.8 kg (217 lb 13 oz)  Body mass index is 28.74 kg/m².      Intake/Output Summary (Last 24 hours) at 3/26/2020 1129  Last data filed at 3/26/2020 1000  Gross per 24 hour   Intake 2115.81 ml   Output 2275 ml   Net -159.19 ml       Physical Exam   Constitutional:   Please see attending attestation. Physical exam not performed in order to minimize COVID-19 contacts. Information gathered from nursing assessments.     HENT:   Head: Normocephalic and atraumatic.   Cardiovascular: Normal rate and regular rhythm.   Pulmonary/Chest:   Please see vent settings       Vents:  Vent Mode: Spont (03/26/20 1117)  Ventilator Initiated: Yes (03/20/20 5827)  Set Rate: 0 BPM (03/26/20 1117)  Vt Set: 480 mL (03/26/20 1117)  Pressure Support: 0 cmH20 (03/26/20 1117)  PEEP/CPAP: (S) 0 cmH20(NIF -26, VC 1104) (03/26/20 1117)  Oxygen Concentration (%): 30 (03/26/20 1117)  Peak Airway Pressure: 2.5 cmH2O (03/26/20 1117)  Plateau Pressure: 0 cmH20 (03/26/20 1117)  Total Ve: 11.1 mL (03/26/20 1117)  Negative Inspiratory Force (cm H2O): -26 (03/26/20 1117)  F/VT Ratio<105 (RSBI): (!) 54.64 (03/26/20 1117)  Lines/Drains/Airways     Central Venous Catheter Line                 Hemodialysis Catheter 03/20/20 1142 5 days    Permacath 03/20/20 1142 5 days    Trialysis (Dialysis) Catheter 03/21/20 0020 right internal jugular 5 days          Drain                 NG/OG Tube 03/21/20 0147 orogastric 14 Fr. Center  mouth 5 days         Urethral Catheter 03/21/20 0147 Straight-tip 16 Fr. 5 days          Airway                 Airway - Non-Surgical 03/20/20 2310 Endotracheal Tube 5 days          Arterial Line                 Arterial Line 03/20/20 2359 Left Radial 5 days          Peripheral Intravenous Line                 Peripheral IV - Single Lumen 03/21/20 0144 20 G Right Hand 5 days              Significant Labs:    CBC/Anemia Profile:  Recent Labs   Lab 03/25/20  0354 03/26/20  0320   WBC 9.29 13.05*   HGB 9.1* 10.1*   HCT 30.9* 34.7*   * 319   MCV 96 98   RDW 13.2 13.2        Chemistries:  Recent Labs   Lab 03/25/20  0354 03/25/20  1010 03/26/20  0320   * 150* 150*   K 4.2 4.1 4.3    111* 113*   CO2 27 27 26   BUN 47* 48* 45*   CREATININE 2.6* 2.6* 2.5*   CALCIUM 8.4* 8.6* 9.1   ALBUMIN 1.8* 1.8* 1.9*   PROT 7.1  --  7.8   BILITOT 0.2  --  0.2   ALKPHOS 106  --  132   ALT <5*  --  <5*   AST 19  --  19   MG 2.7*  --  2.6   PHOS 2.3* 2.9 2.6*         Significant Imaging:  I have reviewed all pertinent imaging results/findings within the past 24 hours.

## 2020-03-26 NOTE — CARE UPDATE
Pt extubated to 2L NC per MD order. RN at beside with RT. Pt tolerated well, SpO2 >88%. Will continue to monitor.

## 2020-03-26 NOTE — PROGRESS NOTES
"  [facility]  Adult Nutrition  Consult Note    SUMMARY     Recommendations    1. If able to advance diet, recommend Diabetic diet (texture per SLP).   2. If unable to advance diet, place NGT & resume enteral nutrition. Rec'd Glucerna 1.5 @ 60 mL/hr to provide 2160 kcals, 119 g of protein, 1093 mL fluid.  3. RD to monitor & follow-up.    Goals: Meet % EEN, EPN by RD f/u date  Nutrition Goal Status: progressing towards goal  Communication of RD Recs: reviewed with RN    Reason for Assessment    Reason For Assessment: RD follow-up  Diagnosis: other (see comments)(Suspected COVID-19)  Relevant Medical History: DM  Interdisciplinary Rounds: did not attend    General Information Comments: Pt intubated 3/20, extubated this afternoon. Remains NPO. RD working remotely. Per H & P, pt w/ decreased appetite x 1 week PTA. Pt diagnosed w/ severe malnutrition at time of last visit (7/2019) & weighed 185#. Noted pt w/ weight gain since then. NFPE not performed, patient has been screened for possible COVID-19 and has been placed on airborne and contact precautions. Awaiting results from COVID-19 testing. RD unable to assess for malnutrition, monitoring.  Nutrition Discharge Planning: Unable to determine    Nutrition/Diet History    Spiritual, Cultural Beliefs, Synagogue Practices, Values that Affect Care: no  Factors Affecting Nutritional Intake: NPO    Anthropometrics    Temp: 97.7 °F (36.5 °C)  Height Method: Stated  Height: 6' 1" (185.4 cm)  Height (inches): 73 in  Weight Method: Bed Scale  Weight: 98.8 kg (217 lb 13 oz)  Weight (lb): 217.82 lb  Ideal Body Weight (IBW), Male: 184 lb  % Ideal Body Weight, Male (lb): 118.38 %  BMI (Calculated): 28.7  BMI Grade: 25 - 29.9 - overweight    Lab/Procedures/Meds    Pertinent Labs Reviewed: reviewed  Pertinent Labs Comments: Na 150, BUN 47, Creat 2.4, GFR 34.8, Gluc 288, A1C 12  Pertinent Medications Reviewed: reviewed    Estimated/Assessed Needs    Weight Used " For Calorie Calculations: 98.8 kg (217 lb 13 oz)     Energy Calorie Requirements (kcal): 2371 kcal/d  Energy Need Method: [place]-St Jeor(1.25 PAL)     Protein Requirements:  g/d (1-1.2 g/kg)   Weight Used For Protein Calculations: 98.8 kg (217 lb 13 oz)     Estimated Fluid Requirement Method: other (see comments)(Per MD or 1 mL/kcal)     CHO Requirement: 50% total kcals    Nutrition Prescription Ordered    Current Diet Order: NPO    Evaluation of Received Nutrient/Fluid Intake    Comments: LBM: 3/20    Tolerance: tolerating    Nutrition Risk    Level of Risk/Frequency of Follow-up: (2x/week)     Assessment and Plan    Nutrition Problem  Inadequate energy intake    Related to (etiology):   Inability to consume sufficient energy     Signs and Symptoms (as evidenced by):   NPO    Interventions(treatment strategy):  Collaboration of nutrition care w/ other providers     Nutrition Diagnosis Status:   New     Monitor and Evaluation    Food and Nutrient Intake: energy intake, food and beverage intake, enteral nutrition intake  Food and Nutrient Adminstration: diet order, enteral and parenteral nutrition administration  Physical Activity and Function: nutrition-related ADLs and IADLs  Anthropometric Measurements: weight, weight change  Biochemical Data, Medical Tests and Procedures: inflammatory profile, lipid profile, glucose/endocrine profile, gastrointestinal profile, electrolyte and renal panel  Nutrition-Focused Physical Findings: overall appearance     Nutrition Follow-Up    RD Follow-up?: Yes

## 2020-03-26 NOTE — PROGRESS NOTES
"[facility]  Critical Care Medicine  Progress Note    Patient Name: Indio Ryder Jr.  MRN: [id number]  Admission Date: 3/10/2020  Hospital Length of Stay: 16 days  Code Status: Full Code  Attending Provider: Deborah Aleman DO  Primary Care Provider: Lorena Thakur MD   Principal Problem: Suspected Covid-19 Virus Infection    Subjective:     HPI:  Per  HPI    "Mr. Ryder is 50 y/o with a history of poorly controlled DM2 ( last Ha1c 8.4 ) , DKA with coma, diabetic neuropathy, diabatic retinopathy and diabetic foot with multiple amputation who presents to ED with chief complaint of chills associated with nausea and vomiting for one day. He sates that yesterday he started to feel ill and he has been having chills associated with nauseas and vomiting. He also endorses poor appetite with decreased oral intake for the last week. Denies fever, abdominal pain, feet or leg pain, leg swelling, CP, SOB, dysuria. He a history of poorly control diabetes with amputation of his right big and 5th toes. He undergone left big toe detriment and imputation on 7/14/2019. He reports not feeling well for the last couple of weeks and he felt about to pass out on Mardi gras and his BP was in 90/50. He was seen by podiatry last  Thursday when he had his wound opened and he was prescribed doxycycline for 10 days.      In ED, he was afebrile, vitally stable. Lab, BS: 382, B-hydroxybutyrate 3.6, HCO3 22,  anion gap 18. WBC: 6.9, lactate: 1.2  X-ray of right food shows soft tissue edema concerning for osteomyelitis. He received single dose of vanc/zosyn and 2L IVF.     Overview/Hospital Course:  Admitted to . Started on insulin gtt and IVF infusion. Pt is currently treated with doxycyline for wound in left foot. ID and podiatry consulted. MRI with acute osteomyelitis of 4th metatarsal head and neck of left proximal phalanx. ID recommended cefazolin and podiatry plan for for amputation. Pt is refusing amputation " "but agree to have agressive washout if he keep spiking fever. Pt transitioned to subQ insulin and tolerating PO. Blood ux grwe Staph coagulase negative, TTE done and shows normal valves with no vegetations. Pt had drop in his SpO2 to 85 with need for 5L oxygen. Respiratory cx with many GPC and CXR shows b/l patchy infiltration. Pt still spiking fever and in acute respiratory failure. Concern for COVID-19, test is sent. "     Rapid response called on pt as oxygen requirements are slowly increasing. Pt with oxygen saturations of 88% on 8L NC. Pt transferred to CMICU and intubated the unit.     Hospital/ICU Course:  Patient initially admitted to hospital medicine for DKA and left foot osteomyelitis, on the night of 3/20 patient became hypoxic and requiring ICU admission. Patient suspected to have COVID, test pending. Patient was intubated started on steroid, hydroxychlorquine, and Azithromycin - all completed. Continued cefazolin, per ID recs for left foot osteo. Overall, improving on vent with decreasing vent requirements. DKA/HHS resolved with insuin gtt although still having adjusting insulin regimen for better control. On 3/25, patient tolerated SBT for 3 hours. Continuing to wean propofol and started precedex. Will attempt SBT again today and hopefully extubate.       Interval History/Significant Events:   Please see above    Review of Systems   Unable to perform ROS: Intubated     Objective:     Vital Signs (Most Recent):  Temp: 97.7 °F (36.5 °C) (03/26/20 0800)  Pulse: 90 (03/26/20 1117)  Resp: (!) 33 (03/26/20 1117)  BP: 135/69 (03/26/20 1010)  SpO2: 99 % (03/26/20 1117) Vital Signs (24h Range):  Temp:  [97.7 °F (36.5 °C)-99 °F (37.2 °C)] 97.7 °F (36.5 °C)  Pulse:  [] 90  Resp:  [18-33] 33  SpO2:  [94 %-99 %] 99 %  BP: (108-192)/(63-99) 135/69  Arterial Line BP: (107-200)/(54-90) 134/64   Weight: 98.8 kg (217 lb 13 oz)  Body mass index is 28.74 kg/m².      Intake/Output Summary (Last 24 hours) at " 3/26/2020 1129  Last data filed at 3/26/2020 1000  Gross per 24 hour   Intake 2115.81 ml   Output 2275 ml   Net -159.19 ml       Physical Exam   Constitutional:   Please see attending attestation. Physical exam not performed in order to minimize COVID-19 contacts. Information gathered from nursing assessments.     HENT:   Head: Normocephalic and atraumatic.   Cardiovascular: Normal rate and regular rhythm.   Pulmonary/Chest:   Please see vent settings       Vents:  Vent Mode: Spont (03/26/20 1117)  Ventilator Initiated: Yes (03/20/20 2359)  Set Rate: 0 BPM (03/26/20 1117)  Vt Set: 480 mL (03/26/20 1117)  Pressure Support: 0 cmH20 (03/26/20 1117)  PEEP/CPAP: (S) 0 cmH20(NIF -26, VC 1104) (03/26/20 1117)  Oxygen Concentration (%): 30 (03/26/20 1117)  Peak Airway Pressure: 2.5 cmH2O (03/26/20 1117)  Plateau Pressure: 0 cmH20 (03/26/20 1117)  Total Ve: 11.1 mL (03/26/20 1117)  Negative Inspiratory Force (cm H2O): -26 (03/26/20 1117)  F/VT Ratio<105 (RSBI): (!) 54.64 (03/26/20 1117)  Lines/Drains/Airways     Central Venous Catheter Line                 Hemodialysis Catheter 03/20/20 1142 5 days    Permacath 03/20/20 1142 5 days    Trialysis (Dialysis) Catheter 03/21/20 0020 right internal jugular 5 days          Drain                 NG/OG Tube 03/21/20 0147 orogastric 14 Fr. Center mouth 5 days         Urethral Catheter 03/21/20 0147 Straight-tip 16 Fr. 5 days          Airway                 Airway - Non-Surgical 03/20/20 2310 Endotracheal Tube 5 days          Arterial Line                 Arterial Line 03/20/20 2359 Left Radial 5 days          Peripheral Intravenous Line                 Peripheral IV - Single Lumen 03/21/20 0144 20 G Right Hand 5 days              Significant Labs:    CBC/Anemia Profile:  Recent Labs   Lab 03/25/20  0354 03/26/20  0320   WBC 9.29 13.05*   HGB 9.1* 10.1*   HCT 30.9* 34.7*   * 319   MCV 96 98   RDW 13.2 13.2        Chemistries:  Recent Labs   Lab 03/25/20  0354 03/25/20  1010  03/26/20  0320   * 150* 150*   K 4.2 4.1 4.3    111* 113*   CO2 27 27 26   BUN 47* 48* 45*   CREATININE 2.6* 2.6* 2.5*   CALCIUM 8.4* 8.6* 9.1   ALBUMIN 1.8* 1.8* 1.9*   PROT 7.1  --  7.8   BILITOT 0.2  --  0.2   ALKPHOS 106  --  132   ALT <5*  --  <5*   AST 19  --  19   MG 2.7*  --  2.6   PHOS 2.3* 2.9 2.6*         Significant Imaging:  I have reviewed all pertinent imaging results/findings within the past 24 hours.      ABG  No results for input(s): PH, PO2, PCO2, HCO3, BE in the last 168 hours.  Assessment/Plan:     Ophtho  Uncontrolled type 2 diabetes mellitus  A1C 12. Home meds insulin detemir 25U qhs, aspart 10 tidwm, victoza, and metformin  - HHS/DKA resolved  - BGs increasing again to 250s. increased detemir to 25u qhs + moderate intensity SSI    Cardiac/Vascular  Mixed hyperlipidemia  - Continue statin    Renal/  TAHIR (acute kidney injury)  Patient's Cr elevated from his baseline. TAHIR most likely due to Pre-renal vs AIN from zosyn vs. vanc toxicity vs COVID induced nephropathy  -- kidney function slowly improving   -- urinalysis/urine electrolytes consistent with intrarenal disease  -- Making good urine   -- Strict I/O for now and low threshold for Nephrology   -- Avoid nephrotoxic agents  -- renally dose medications    ID  Osteomyelitis of left foot  -- Podiatry and ID following  -- s/p iv zosyn. Stopped Zosyn   -- On cefazolin per ID (MSSA), pharm D for dosing  -- will need outpatient cefazolin once stable    GI  Constipation  - no BM since 3/18  - continue senna and miralax     Other  * Suspected Covid-19 Virus Infection  Acute Hypoxic Respiratory Failure    -- DDx including: aspiration event vs multifocal bacterial pneumonia.   -- Hypoxemia may be ARDS related to underlying sepsis from uncontrolled osteomyelitis vs COVID  -- COVID-19 testing pending  - Infection Control notified  - Isolation: Airborne and Droplet Precautions  - Diagnostics (leukopenia, hyponatremia, hyperferritinemia,  elevated troponin, elevated d-dimer, age, and comorbidities are significant predictors of poor clinical outcome)              - CBC no leukopenia or lymphonenia              - CMP with hyponatremia; now hypernatremic              - d-dimer, ferritin, troponin, CRP, LDH, Procalcitonin              - ECG non-ischemic              - rapid Flu neg              - RIP neg              - Legionella antigen              - Blood culture x2 NGTD              - CXR with bibasilar patchy infiltrates on arrival. Repeat s/p tube placement              - UA and culture neg    Plan:              - Intubated, Sedated - tolerated SBT yesterday, will attempt again today with possible extubation.     - Vent requirements improving, currently on 30%/5 PEEP.               - Telemetry & Continuous Pulse Ox   - Follow up COVID result              - Methylprednisolone 40mg daily - complete               - hydroxychloroquine 5 day course  - complete    - Azithromycin 1500mg total - complete               - Empiric antibiotics for CAP/HCAP on arrival with zosyn, final day is 3/17, completed.       Critical secondary to Patient has a condition that poses threat to life and bodily function: Severe Respiratory Distress      Critical care was time spent personally by me on the following activities: development of treatment plan with patient or surrogate and bedside caregivers, discussions with consultants, evaluation of patient's response to treatment, examination of patient, ordering and performing treatments and interventions, ordering and review of laboratory studies, ordering and review of radiographic studies, pulse oximetry, re-evaluation of patient's condition. This critical care time did not overlap with that of any other provider or involve time for any procedures.     Mumtaz Saldana MD  Critical Care Medicine  [facility]

## 2020-03-26 NOTE — ASSESSMENT & PLAN NOTE
A1C 12. Home meds insulin detemir 25U qhs, aspart 10 tidwm, victoza, and metformin  - HHS/DKA resolved  - BGs increasing again to 250s. increased detemir to 25u qhs + moderate intensity SSI

## 2020-03-27 PROBLEM — U07.1 COVID-19 VIRUS DETECTED: Status: ACTIVE | Noted: 2020-03-27

## 2020-03-27 LAB
ALBUMIN SERPL BCP-MCNC: 1.9 G/DL (ref 3.5–5.2)
ALBUMIN SERPL BCP-MCNC: 2 G/DL (ref 3.5–5.2)
ALP SERPL-CCNC: 124 U/L (ref 55–135)
ALT SERPL W/O P-5'-P-CCNC: <5 U/L (ref 10–44)
ANION GAP SERPL CALC-SCNC: 10 MMOL/L (ref 8–16)
ANION GAP SERPL CALC-SCNC: 12 MMOL/L (ref 8–16)
AST SERPL-CCNC: 25 U/L (ref 10–40)
BASOPHILS # BLD AUTO: 0.02 K/UL (ref 0–0.2)
BASOPHILS NFR BLD: 0.2 % (ref 0–1.9)
BILIRUB SERPL-MCNC: 0.2 MG/DL (ref 0.1–1)
BUN SERPL-MCNC: 35 MG/DL (ref 6–20)
BUN SERPL-MCNC: 36 MG/DL (ref 6–20)
BUN SERPL-MCNC: 38 MG/DL (ref 6–20)
BUN SERPL-MCNC: 40 MG/DL (ref 6–20)
CALCIUM SERPL-MCNC: 8.9 MG/DL (ref 8.7–10.5)
CALCIUM SERPL-MCNC: 9.2 MG/DL (ref 8.7–10.5)
CALCIUM SERPL-MCNC: 9.2 MG/DL (ref 8.7–10.5)
CALCIUM SERPL-MCNC: 9.3 MG/DL (ref 8.7–10.5)
CHLORIDE SERPL-SCNC: 109 MMOL/L (ref 95–110)
CHLORIDE SERPL-SCNC: 112 MMOL/L (ref 95–110)
CO2 SERPL-SCNC: 24 MMOL/L (ref 23–29)
CO2 SERPL-SCNC: 26 MMOL/L (ref 23–29)
CO2 SERPL-SCNC: 26 MMOL/L (ref 23–29)
CO2 SERPL-SCNC: 28 MMOL/L (ref 23–29)
CREAT SERPL-MCNC: 1.8 MG/DL (ref 0.5–1.4)
CREAT SERPL-MCNC: 1.9 MG/DL (ref 0.5–1.4)
CREAT SERPL-MCNC: 1.9 MG/DL (ref 0.5–1.4)
CREAT SERPL-MCNC: 2 MG/DL (ref 0.5–1.4)
CRP SERPL-MCNC: 18.2 MG/L (ref 0–8.2)
D DIMER PPP IA.FEU-MCNC: 6.22 MG/L FEU
DIFFERENTIAL METHOD: ABNORMAL
EOSINOPHIL # BLD AUTO: 0.1 K/UL (ref 0–0.5)
EOSINOPHIL NFR BLD: 0.9 % (ref 0–8)
ERYTHROCYTE [DISTWIDTH] IN BLOOD BY AUTOMATED COUNT: 13.2 % (ref 11.5–14.5)
EST. GFR  (AFRICAN AMERICAN): 43.4 ML/MIN/1.73 M^2
EST. GFR  (AFRICAN AMERICAN): 46.2 ML/MIN/1.73 M^2
EST. GFR  (AFRICAN AMERICAN): 46.2 ML/MIN/1.73 M^2
EST. GFR  (AFRICAN AMERICAN): 49.3 ML/MIN/1.73 M^2
EST. GFR  (NON AFRICAN AMERICAN): 37.5 ML/MIN/1.73 M^2
EST. GFR  (NON AFRICAN AMERICAN): 39.9 ML/MIN/1.73 M^2
EST. GFR  (NON AFRICAN AMERICAN): 39.9 ML/MIN/1.73 M^2
EST. GFR  (NON AFRICAN AMERICAN): 42.6 ML/MIN/1.73 M^2
GLUCOSE SERPL-MCNC: 168 MG/DL (ref 70–110)
GLUCOSE SERPL-MCNC: 180 MG/DL (ref 70–110)
GLUCOSE SERPL-MCNC: 197 MG/DL (ref 70–110)
GLUCOSE SERPL-MCNC: 261 MG/DL (ref 70–110)
HCT VFR BLD AUTO: 36.9 % (ref 40–54)
HGB BLD-MCNC: 10.6 G/DL (ref 14–18)
IMM GRANULOCYTES # BLD AUTO: 0.04 K/UL (ref 0–0.04)
IMM GRANULOCYTES NFR BLD AUTO: 0.3 % (ref 0–0.5)
LYMPHOCYTES # BLD AUTO: 1.4 K/UL (ref 1–4.8)
LYMPHOCYTES NFR BLD: 11.7 % (ref 18–48)
MAGNESIUM SERPL-MCNC: 2.3 MG/DL (ref 1.6–2.6)
MCH RBC QN AUTO: 28 PG (ref 27–31)
MCHC RBC AUTO-ENTMCNC: 28.7 G/DL (ref 32–36)
MCV RBC AUTO: 98 FL (ref 82–98)
MONOCYTES # BLD AUTO: 0.9 K/UL (ref 0.3–1)
MONOCYTES NFR BLD: 7.3 % (ref 4–15)
NEUTROPHILS # BLD AUTO: 9.4 K/UL (ref 1.8–7.7)
NEUTROPHILS NFR BLD: 79.6 % (ref 38–73)
NRBC BLD-RTO: 0 /100 WBC
PHOSPHATE SERPL-MCNC: 2.5 MG/DL (ref 2.7–4.5)
PHOSPHATE SERPL-MCNC: 2.5 MG/DL (ref 2.7–4.5)
PHOSPHATE SERPL-MCNC: 2.8 MG/DL (ref 2.7–4.5)
PHOSPHATE SERPL-MCNC: 3.1 MG/DL (ref 2.7–4.5)
PLATELET # BLD AUTO: 326 K/UL (ref 150–350)
PMV BLD AUTO: 10.1 FL (ref 9.2–12.9)
POCT GLUCOSE: 146 MG/DL (ref 70–110)
POCT GLUCOSE: 171 MG/DL (ref 70–110)
POCT GLUCOSE: 196 MG/DL (ref 70–110)
POCT GLUCOSE: 198 MG/DL (ref 70–110)
POCT GLUCOSE: 237 MG/DL (ref 70–110)
POCT GLUCOSE: 246 MG/DL (ref 70–110)
POTASSIUM SERPL-SCNC: 3.7 MMOL/L (ref 3.5–5.1)
POTASSIUM SERPL-SCNC: 3.9 MMOL/L (ref 3.5–5.1)
POTASSIUM SERPL-SCNC: 3.9 MMOL/L (ref 3.5–5.1)
POTASSIUM SERPL-SCNC: 4.4 MMOL/L (ref 3.5–5.1)
PROT SERPL-MCNC: 7.5 G/DL (ref 6–8.4)
RBC # BLD AUTO: 3.78 M/UL (ref 4.6–6.2)
SODIUM SERPL-SCNC: 145 MMOL/L (ref 136–145)
SODIUM SERPL-SCNC: 150 MMOL/L (ref 136–145)
TRIGL SERPL-MCNC: 171 MG/DL (ref 30–150)
WBC # BLD AUTO: 11.84 K/UL (ref 3.9–12.7)

## 2020-03-27 PROCEDURE — S0028 INJECTION, FAMOTIDINE, 20 MG: HCPCS | Performed by: STUDENT IN AN ORGANIZED HEALTH CARE EDUCATION/TRAINING PROGRAM

## 2020-03-27 PROCEDURE — C9399 UNCLASSIFIED DRUGS OR BIOLOG: HCPCS | Performed by: STUDENT IN AN ORGANIZED HEALTH CARE EDUCATION/TRAINING PROGRAM

## 2020-03-27 PROCEDURE — 83735 ASSAY OF MAGNESIUM: CPT

## 2020-03-27 PROCEDURE — 97164 PT RE-EVAL EST PLAN CARE: CPT

## 2020-03-27 PROCEDURE — 99233 SBSQ HOSP IP/OBS HIGH 50: CPT | Mod: ,,, | Performed by: INTERNAL MEDICINE

## 2020-03-27 PROCEDURE — 63600175 PHARM REV CODE 636 W HCPCS: Performed by: EMERGENCY MEDICINE

## 2020-03-27 PROCEDURE — 85379 FIBRIN DEGRADATION QUANT: CPT

## 2020-03-27 PROCEDURE — 80069 RENAL FUNCTION PANEL: CPT

## 2020-03-27 PROCEDURE — 86140 C-REACTIVE PROTEIN: CPT

## 2020-03-27 PROCEDURE — 84478 ASSAY OF TRIGLYCERIDES: CPT

## 2020-03-27 PROCEDURE — 25000003 PHARM REV CODE 250: Performed by: STUDENT IN AN ORGANIZED HEALTH CARE EDUCATION/TRAINING PROGRAM

## 2020-03-27 PROCEDURE — 99233 PR SUBSEQUENT HOSPITAL CARE,LEVL III: ICD-10-PCS | Mod: ,,, | Performed by: INTERNAL MEDICINE

## 2020-03-27 PROCEDURE — 97110 THERAPEUTIC EXERCISES: CPT

## 2020-03-27 PROCEDURE — 63600175 PHARM REV CODE 636 W HCPCS: Performed by: NURSE PRACTITIONER

## 2020-03-27 PROCEDURE — 63600175 PHARM REV CODE 636 W HCPCS: Performed by: STUDENT IN AN ORGANIZED HEALTH CARE EDUCATION/TRAINING PROGRAM

## 2020-03-27 PROCEDURE — 20600001 HC STEP DOWN PRIVATE ROOM

## 2020-03-27 PROCEDURE — 85025 COMPLETE CBC W/AUTO DIFF WBC: CPT

## 2020-03-27 PROCEDURE — 84100 ASSAY OF PHOSPHORUS: CPT

## 2020-03-27 PROCEDURE — 80053 COMPREHEN METABOLIC PANEL: CPT

## 2020-03-27 PROCEDURE — 80069 RENAL FUNCTION PANEL: CPT | Mod: 91

## 2020-03-27 RX ADMIN — FAMOTIDINE 20 MG: 10 INJECTION INTRAVENOUS at 08:03

## 2020-03-27 RX ADMIN — INSULIN DETEMIR 25 UNITS: 100 INJECTION, SOLUTION SUBCUTANEOUS at 08:03

## 2020-03-27 RX ADMIN — DOCUSATE SODIUM - SENNOSIDES 1 TABLET: 50; 8.6 TABLET, FILM COATED ORAL at 08:03

## 2020-03-27 RX ADMIN — CEFAZOLIN 2 G: 1 INJECTION, POWDER, FOR SOLUTION INTRAMUSCULAR; INTRAVENOUS at 12:03

## 2020-03-27 RX ADMIN — HYDRALAZINE HYDROCHLORIDE 10 MG: 20 INJECTION INTRAMUSCULAR; INTRAVENOUS at 06:03

## 2020-03-27 RX ADMIN — INSULIN ASPART 4 UNITS: 100 INJECTION, SOLUTION INTRAVENOUS; SUBCUTANEOUS at 07:03

## 2020-03-27 RX ADMIN — METOPROLOL TARTRATE 50 MG: 50 TABLET, FILM COATED ORAL at 08:03

## 2020-03-27 RX ADMIN — INSULIN ASPART 2 UNITS: 100 INJECTION, SOLUTION INTRAVENOUS; SUBCUTANEOUS at 06:03

## 2020-03-27 RX ADMIN — CEFAZOLIN 2 G: 1 INJECTION, POWDER, FOR SOLUTION INTRAMUSCULAR; INTRAVENOUS at 08:03

## 2020-03-27 RX ADMIN — HYDRALAZINE HYDROCHLORIDE 10 MG: 20 INJECTION INTRAMUSCULAR; INTRAVENOUS at 03:03

## 2020-03-27 RX ADMIN — INSULIN ASPART 1 UNITS: 100 INJECTION, SOLUTION INTRAVENOUS; SUBCUTANEOUS at 12:03

## 2020-03-27 RX ADMIN — POLYETHYLENE GLYCOL 3350 17 G: 17 POWDER, FOR SOLUTION ORAL at 08:03

## 2020-03-27 RX ADMIN — CEFAZOLIN 2 G: 1 INJECTION, POWDER, FOR SOLUTION INTRAMUSCULAR; INTRAVENOUS at 05:03

## 2020-03-27 RX ADMIN — ENOXAPARIN SODIUM 40 MG: 100 INJECTION SUBCUTANEOUS at 08:03

## 2020-03-27 NOTE — RESIDENT HANDOFF
Handoff     Primary Team: [facility] [place] [facility] Room Number: 6089/6089 A     Patient Name: Indio Ryder Jr. MRN: [id number]     Date of Birth: [id number] Allergies: Patient has no known allergies.     Age: 49 y.o. Admit Date: 3/10/2020     Sex: male  BMI: Body mass index is 28.74 kg/m².     Code Status: Full Code        Illness Level (current clinical status): Watcher - Yes - High alert for possible respiratory failure.    Reason for Admission: Suspected Covid-19 Virus Infection    Brief HPI (pertinent PMH and diagnosis or differential diagnosis): Mr. Ryder is 50 y/o with a history of poorly controlled DM2 ( last Ha1c 8.4 ) , DKA with coma, diabetic neuropathy, diabatic retinopathy and diabetic foot with multiple amputation who presents to ED with chief complaint of chills associated with nausea and vomiting for one day. He sates that yesterday he started to feel ill and he has been having chills associated with nauseas and vomiting. He also endorses poor appetite with decreased oral intake for the last week. Denies fever, abdominal pain, feet or leg pain, leg swelling, CP, SOB, dysuria. He a history of poorly control diabetes with amputation of his right big and 5th toes. He undergone left big toe detriment and imputation on 7/14/2019. He reports not feeling well for the last couple of weeks and he felt about to pass out on Mardi gras and his BP was in 90/50. He was seen by podiatry last  Thursday when he had his wound opened and he was prescribed doxycycline for 10 days.    Procedure Date: Intubated 3/20 and extubated 3/24.     Hospital Course (updated, brief assessment by system or problem, significant events): Patient initially admitted to hospital medicine for DKA and left foot osteomyelitis, on the night of 3/20 patient became hypoxic and requiring ICU admission. Patient suspected to have COVID, test pending. Patient was intubated started on steroid, hydroxychlorquine, and Azithromycin - all completed.  Continued cefazolin, per ID recs for left foot osteo. Overall, improving on vent with decreasing vent requirements. DKA/HHS resolved with insuin gtt although still having adjusting insulin regimen for better control. On 3/25, patient tolerated SBT for 3 hours. Continuing to wean propofol and started precedex. Patient extubated 3/23.     Stable for stepdown to floor.       Tasks (specific, using if-then statements):   - PT/OT consulted and recommend HH for PT.      Contingency Plan (special circumstances anticipated and plan): Reach out to critical care medicine if he is requiring higher levels of oxygen support.     Mentored By: Dr Deborah Aleman

## 2020-03-27 NOTE — PLAN OF CARE
Pt stepping down to hospital medicine. 8094 is assigned room.  Home Health referral sent to [facility] for PT to eval and tx.  No h/h orders completed @ this time.        03/27/20 1406   Post-Acute Status   Post-Acute Authorization Home Health   Home Health Status Referrals Sent  ([facility] )   Discharge Delays None known at this time   Discharge Plan   Discharge Plan A Home Health   Discharge Plan B Home with family

## 2020-03-27 NOTE — ASSESSMENT & PLAN NOTE
-- Podiatry and ID following  -- s/p iv zosyn. Stopped Zosyn   -- On cefazolin per ID (MSSA), pharm D for dosing  -- will need outpatient cefazolin once stable    -- END-DATE 6/22

## 2020-03-27 NOTE — PT/OT/SLP RE-EVAL
Physical Therapy Re-evaluation and treatment    Patient Name:  Indio Ryder Jr.   MRN:  [id number]    Recommendations:     Discharge Recommendations:  home health PT   Discharge Equipment Recommendations: (will determine DME needs closer to discharge)   Barriers to discharge: Decreased caregiver support family may not be able to assist at current functional level.     Assessment:     Indio Ryder Jr. is a 49 y.o. male admitted with a medical diagnosis of Suspected Covid-19 Virus Infection.  He presents with the following impairments/functional limitations:  weakness, impaired functional mobilty, gait instability, impaired endurance, impaired balance . Pt april treatment well and will benefit from skilled PT 4x/wk to progress physically. Pt should be able to discharge home with HHPT and DME to be determined closer to discharge. Pt was evaluated 3/12 with dx of diabetic ketoacidosis.    SOCIAL: pt lives with his mother in 1 story with 3 steps and R handrail . Pt ambulated without AD but owns, RW and shower chair. .    Rehab Prognosis:  good; patient would benefit from acute skilled PT services to address these deficits and reach maximum level of function.      Recent Surgery: * No surgery found *      Plan:     During this hospitalization, patient to be seen 4 x/week to address the above listed problems via gait training, therapeutic activities, therapeutic exercises  · Plan of Care Expires:  04/26/20   Plan of Care Reviewed with: patient    Subjective     Communicated with nurse prior to session.  Patient found supine with telemetry, blood pressure cuff, pulse ox (continuous)(R IJ dialysis catheter, B duncan daniel boots, bed alarm) upon PT entry to room, agreeable to evaluation.      Chief Complaint: pt had no complaints during treatment.   Patient comments/goals:  To get better and go home.   Pain/Comfort:  · Pain Rating 1: 0/10  · Pain Rating Post-Intervention 1: 0/10    Patients cultural, spiritual, Uatsdin  conflicts given the current situation: no      Objective:     Patient found with: telemetry, blood pressure cuff, pulse ox (continuous)(R IJ dialysis catheter, B duncan daniel boots)     General Precautions: Standard, fall, airborne   Orthopedic Precautions:N/A(none when pt was evaluated, but pt has a diabetic foot ulcer on bottom of left forefoot; podiatry requested for weight on R LE to be placed on heel)   Braces:       Exams:  · Cognitive Exam:  Patient is oriented to Person, Place, Time and Situation  · RLE ROM: WFL  · RLE Strength: 3/5 for major muscle groups  · LLE ROM: WFL  · LLE Strength: 3/5 for major muscle groups    Functional Mobility:  · Bed Mobility:     · Rolling Left:  total assistance  · Supine to Sit: total assistance  · Sit to supine: total assistance  ·   · Transfers:     · Sit to Stand:  total assistance with hand-held assist. Pt was able to minimally lift buttocks off of bed.   ·   · Balance: pt sat on EOB x 10 min with CGA.     AM-PAC 6 CLICK MOBILITY  Total Score:9       Therapeutic Activities and Exercises:   pt performed AROM there exer BLE in sitting x 10 reps.    Patient left supine with all lines intact and call button in reach.    GOALS:   Multidisciplinary Problems     Physical Therapy Goals        Problem: Physical Therapy Goal    Goal Priority Disciplines Outcome Goal Variances Interventions   Physical Therapy Goal     PT, PT/OT Ongoing, Progressing     Description:  Goals to be met by:     Patient will increase functional independence with mobility by performin. Supine to sit with Set-up [place] - not met  2. Sit to stand transfer with minimum with RW -not met  3. Bed to chair transfer with minimum using with RW -not met  4. Gait  x 100 feet with Supervision to Stand-by Assistance using Rolling Walker. -not met  5. Ascend/descend 4 stairs with right Handrail Minimal Assistance using Rolling Walker. -not met  5. Lower extremity exercise program x10 reps per handout,  with supervision -not met                       History:     Past Medical History:   Diagnosis Date    Actinomyces infection 1/17/2017    Right foot    Diabetic ketoacidosis without coma associated with type 2 diabetes mellitus 5/30/2017    Diabetic ulcer of right foot associated with type 2 diabetes mellitus 6/3/2015    Essential hypertension 6/6/2013    Group B streptococcal infection 1/13/2017    Mixed hyperlipidemia 8/12/2014    Shoulder impingement 8/12/2014    Subacute osteomyelitis of right foot 1/12/2017    Streptococcus agalactiae, Actinomyces odontolyticus    Traumatic amputation of fifth toe of right foot 07/02/2015    Type 2 diabetes mellitus with diabetic neuropathy, with long-term current use of insulin 5/3/2016    Ulcerative colitis, unspecified        Past Surgical History:   Procedure Laterality Date    DEBRIDEMENT OF FOOT Left 7/14/2019    Procedure: DEBRIDEMENT, FOOT, with left 5th ray amputation;  Surgeon: Sis Hickman DPM;  Location: [facility] OR [address] [place] [address];  Service: Podiatry;  Laterality: Left;    DEBRIDEMENT OF FOOT Left 7/17/2019    Procedure: DEBRIDEMENT, FOOT with leftt 5th ray partial amputation with Neox Graft;  Surgeon: Mai Burrell DPM;  Location: [facility] OR [address] [place] [address];  Service: Podiatry;  Laterality: Left;  t    TOE AMPUTATION Right 06/05/2015    TOE AMPUTATION Right 01/19/2017       Time Tracking:     PT Received On: 03/27/20  PT Start Time: 1113     PT Stop Time: 1135  PT Total Time (min): 22 min     Billable Minutes: Re-eval 8 min and Therapeutic Exercise 14 min      Tori Anderson, PT  03/27/2020

## 2020-03-27 NOTE — SUBJECTIVE & OBJECTIVE
Interval History/Significant Events: Transitioned to room air. Stable for stepdown to the floor. Overnight, pt's SARS-CoV-2 testing returned positive.     Review of Systems   Constitutional: Positive for fatigue. Negative for chills, fever and unexpected weight change.   HENT: Negative for facial swelling, sneezing and sore throat.    Eyes: Negative for pain and visual disturbance.   Respiratory: Negative for cough, shortness of breath and wheezing.    Cardiovascular: Negative for chest pain and leg swelling.   Gastrointestinal: Negative for abdominal pain, diarrhea and vomiting.   Endocrine: Negative for polydipsia and polyuria.   Genitourinary: Negative for decreased urine volume, difficulty urinating, dysuria and urgency.   Musculoskeletal: Negative for arthralgias and myalgias.   Skin: Negative for pallor, rash and wound.   Allergic/Immunologic: Negative for environmental allergies and food allergies.   Neurological: Positive for weakness. Negative for dizziness, light-headedness and headaches.   Psychiatric/Behavioral: Negative for confusion. The patient is not nervous/anxious.           Objective:     Vital Signs (Most Recent):  Temp: 97.8 °F (36.6 °C) (03/27/20 1200)  Pulse: 83 (03/27/20 1200)  Resp: 15 (03/27/20 1200)  BP: (!) 163/93 (03/27/20 1200)  SpO2: 95 % (03/27/20 1200) Vital Signs (24h Range):  Temp:  [97.7 °F (36.5 °C)-98.7 °F (37.1 °C)] 97.8 °F (36.6 °C)  Pulse:  [] 83  Resp:  [13-22] 15  SpO2:  [93 %-97 %] 95 %  BP: (148-200)/() 163/93  Arterial Line BP: (145-202)/(33-90) 202/90   Weight: 98.8 kg (217 lb 13 oz)  Body mass index is 28.74 kg/m².      Intake/Output Summary (Last 24 hours) at 3/27/2020 1300  Last data filed at 3/27/2020 0900  Gross per 24 hour   Intake 740 ml   Output 2900 ml   Net -2160 ml       Physical Exam    Vents:  Vent Mode: Spont (03/26/20 1218)  Ventilator Initiated: Yes (03/20/20 8324)  Set Rate: 0 BPM (03/26/20 1218)  Vt Set: 480 mL (03/26/20 1218)  Pressure  Support: 0 cmH20 (03/26/20 1218)  PEEP/CPAP: 0 cmH20 (03/26/20 1218)  Oxygen Concentration (%): 30 (03/26/20 1218)  Peak Airway Pressure: 1.9 cmH2O (03/26/20 1218)  Plateau Pressure: 0 cmH20 (03/26/20 1218)  Total Ve: 6.99 mL (03/26/20 1218)  Negative Inspiratory Force (cm H2O): -26 (03/26/20 1117)  F/VT Ratio<105 (RSBI): (!) 54.64 (03/26/20 1117)  Lines/Drains/Airways     Central Venous Catheter Line                 Hemodialysis Catheter 03/20/20 1142 7 days    Permacath 03/20/20 1142 7 days    Trialysis (Dialysis) Catheter 03/21/20 0020 right internal jugular 6 days          Peripheral Intravenous Line                 Peripheral IV - Single Lumen 03/21/20 0144 20 G Right Hand 6 days              Significant Labs:    CBC/Anemia Profile:  Recent Labs   Lab 03/26/20  0320 03/27/20  0310   WBC 13.05* 11.84   HGB 10.1* 10.6*   HCT 34.7* 36.9*    326   MCV 98 98   RDW 13.2 13.2        Chemistries:  Recent Labs   Lab 03/26/20  0320  03/27/20  0004 03/27/20  0310 03/27/20  0848   *   < > 150* 150* 150*   K 4.3   < > 3.7 3.9 3.9   *   < > 112* 112* 112*   CO2 26   < > 26 28 26   BUN 45*   < > 40* 38* 36*   CREATININE 2.5*   < > 2.0* 1.9* 1.9*   CALCIUM 9.1   < > 9.2 9.3 9.2   ALBUMIN 1.9*   < > 2.0* 1.9* 2.0*   PROT 7.8  --   --  7.5  --    BILITOT 0.2  --   --  0.2  --    ALKPHOS 132  --   --  124  --    ALT <5*  --   --  <5*  --    AST 19  --   --  25  --    MG 2.6  --   --  2.3  --    PHOS 2.6*   < > 2.5* 2.5* 2.8    < > = values in this interval not displayed.       Recent Lab Results       03/27/20  1226   03/27/20  0904   03/27/20  0848   03/27/20  0539   03/27/20  0310        Albumin     2.0   1.9     Alkaline Phosphatase         124     ALT         <5     Anion Gap     12   10     AST         25     Baso #         0.02     Basophil%         0.2     BILIRUBIN TOTAL         0.2  Comment:  For infants and newborns, interpretation of results should be based  on gestational age, weight and in  agreement with clinical  observations.  Premature Infant recommended reference ranges:  Up to 24 hours.............<8.0 mg/dL  Up to 48 hours............<12.0 mg/dL  3-5 days..................<15.0 mg/dL  6-29 days.................<15.0 mg/dL       BUN, Bld     36   38     Calcium     9.2   9.3     Chloride     112   112     CO2     26   28     Creatinine     1.9   1.9     CRP         18.2     D-Dimer         6.22  Comment:  The quantitative D-dimer assay should be used as an aid in   the diagnosis of deep vein thrombosis and pulmonary embolism  in patients with the appropriate presentation and clinical  history. The upper limit of the reference interval and the clinical   cut off   point are identical. Causes of a positive (>0.50 mg/L FEU) D-Dimer   test  include, but are not limited to: DVT, PE, DIC, thrombolytic   therapy, anticoagulant therapy, recent surgery, trauma, or   pregnancy, disseminated malignancy, aortic aneurysm, cirrhosis,  and severe infection. False negative results may occur in   patients with distal DVT.       Differential Method         Automated     eGFR if      46.2   46.2     eGFR if non      39.9  Comment:  Calculation used to obtain the estimated glomerular filtration  rate (eGFR) is the CKD-EPI equation.      39.9  Comment:  Calculation used to obtain the estimated glomerular filtration  rate (eGFR) is the CKD-EPI equation.        Eos #         0.1     Eosinophil%         0.9     Glucose     168   180     Gran # (ANC)         9.4     Gran%         79.6     Hematocrit         36.9     Hemoglobin         10.6     Immature Grans (Abs)         0.04  Comment:  Mild elevation in immature granulocytes is non specific and   can be seen in a variety of conditions including stress response,   acute inflammation, trauma and pregnancy. Correlation with other   laboratory and clinical findings is essential.       Immature Granulocytes         0.3     Lymph #         1.4      Lymph%         11.7     Magnesium         2.3     MCH         28.0     MCHC         28.7     MCV         98     Mono #         0.9     Mono%         7.3     MPV         10.1     nRBC         0     Phosphorus     2.8   2.5     Platelets         326     POCT Glucose 146 198   171       Potassium     3.9   3.9     PROTEIN TOTAL         7.5     RBC         3.78     RDW         13.2     Sodium     150   150     Triglycerides         171  Comment:  The National Cholesterol Education Program (NCEP) has set the  following guidelines (reference values) for triglycerides:  Normal......................<150 mg/dL  Borderline High.............150-199 mg/dL  High........................200-499 mg/dL       WBC         11.84                      03/27/20  0004   03/26/20  2359   03/26/20  2033   03/26/20  1723   03/26/20  1718        Albumin 2.0     1.9       Alkaline Phosphatase               ALT               Anion Gap 12     9       AST               Baso #               Basophil%               BILIRUBIN TOTAL               BUN, Bld 40     43       Calcium 9.2     9.5       Chloride 112     116       CO2 26     30       Creatinine 2.0     2.1       CRP               D-Dimer               Differential Method               eGFR if  43.4     40.9       eGFR if non  37.5  Comment:  Calculation used to obtain the estimated glomerular filtration  rate (eGFR) is the CKD-EPI equation.        35.4  Comment:  Calculation used to obtain the estimated glomerular filtration  rate (eGFR) is the CKD-EPI equation.          Eos #               Eosinophil%               Glucose 197     175       Gran # (ANC)               Gran%               Hematocrit               Hemoglobin               Immature Grans (Abs)               Immature Granulocytes               Lymph #               Lymph%               Magnesium               MCH               MCHC               MCV               Mono #               Mono%                MPV               nRBC               Phosphorus 2.5     2.5       Platelets               POCT Glucose   196 198   173     Potassium 3.7     3.9       PROTEIN TOTAL               RBC               RDW               Sodium 150     155       Triglycerides               WBC                                  All pertinent labs within the past 24 hours have been reviewed.    Significant Imaging:  I have reviewed all pertinent imaging results/findings within the past 24 hours.

## 2020-03-27 NOTE — PROVIDER TRANSFER
[facility] Medicine ICU Acceptance Note    Date of Admit: 3/10/2020  Date of Transfer / Stepdown: 3/27/2020  Yanira, C/J, L, Onc (IV chemo w/in 1 month), Gyn/Onc, or other special case?: no   ICU team stepping patient down: MICU  ICU team member giving verbal handoff: Behram Khan, MD  Accepting  team: [facility]    Brief History of Present Illness:      Indio Ryder Jr. is a 50 y/o with a history of poorly controlled DM2 ( last Ha1c 8.4 ) , DKA with coma, diabetic neuropathy, diabatic retinopathy and diabetic foot with multiple amputation who presents to ED with chief complaint of chills associated with nausea and vomiting for one day. He sates that yesterday he started to feel ill and he has been having chills associated with nauseas and vomiting. He also endorses poor appetite with decreased oral intake for the last week. Denies fever, abdominal pain, feet or leg pain, leg swelling, CP, SOB, dysuria. He a history of poorly control diabetes with amputation of his right big and 5th toes. He undergone left big toe detriment and imputation on 7/14/2019. He reports not feeling well for the last couple of weeks and he felt about to pass out on Mardi gras and his BP was in 90/50. He was seen by podiatry last  Thursday when he had his wound opened and he was prescribed doxycycline for 10 days.      In ED, he was afebrile, vitally stable. Lab, BS: 382, B-hydroxybutyrate 3.6, HCO3 22,  anion gap 18. WBC: 6.9, lactate: 1.2  X-ray of right food shows soft tissue edema concerning for osteomyelitis. He received single dose of vanc/zosyn and 2L IVF.     Hospital/ICU Course:     Patient initially admitted to [facility] medicine for DKA and left foot osteomyelitis, on the night of 3/20 patient became hypoxic and requiring ICU admission. Patient suspected to have COVID, test pending. Patient was intubated started on steroid, hydroxychlorquine, and Azithromycin - all completed. Continued cefazolin, per ID recs for left foot  osteo. Overall, improving on vent with decreasing vent requirements. DKA/HHS resolved with insuin gtt although still having adjusting insulin regimen for better control. On 3/25, patient tolerated SBT for 3 hours. Continuing to wean propofol and started precedex. Patient extubated 3/23.      Stable for stepdown to floor.       Covid-19 Virus Infection  Acute Hypoxic Respiratory Failure  -- DDx including: aspiration event vs multifocal bacterial pneumonia.   -- Hypoxemia may be ARDS related to underlying sepsis from uncontrolled osteomyelitis vs COVID  -- COVID-19 testing on 3/15 which was positive on 3/26  - Infection Control notified  - Isolation: Airborne and Droplet Precautions  - Diagnostics (leukopenia, hyponatremia, hyperferritinemia, elevated troponin, elevated d-dimer, age, and comorbidities are significant predictors of poor clinical outcome)              - CBC no leukopenia or lymphonenia              - CMP with hyponatremia; now hypernatremic              - d-dimer, ferritin, troponin, CRP, LDH, Procalcitonin              - ECG non-ischemic              - rapid Flu neg              - RIP neg              - Legionella antigen              - Blood culture x2 NGTD              - CXR with bibasilar patchy infiltrates on arrival. Repeat s/p tube placement              - UA and culture neg   - Extubated on 3/26              - Telemetry & Continuous Pulse Ox          - Follow up COVID result              - Methylprednisolone 40mg daily - complete               - hydroxychloroquine 5 day course  - complete           - Azithromycin 1500mg total - complete               - Empiric antibiotics for CAP/HCAP on arrival with zosyn, final day is 3/17, completed.     Uncontrolled type 2 diabetes mellitus  A1C 12. Home meds insulin detemir 25U qhs, aspart 10 tidwm, victoza, and metformin   - HHS/DKA resolved  - BGs increasing again to 250s. increased detemir to 25u qhs + moderate intensity SSI  - Diabetic diet       Mixed  hyperlipidemia  - Continue statin     TAHIR (acute kidney injury)  Patient's Cr elevated from his baseline. TAHIR most likely due to Pre-renal vs AIN from zosyn vs. vanc toxicity vs COVID induced nephropathy  -- kidney function slowly improving   -- urinalysis/urine electrolytes consistent with intrarenal disease  -- Making good urine   -- Strict I/O for now and low threshold for Nephrology   -- Avoid nephrotoxic agents  -- renally dose medications       Osteomyelitis of left foot  -- Podiatry and ID following  -- s/p iv zosyn. Stopped Zosyn   -- On cefazolin per ID ([facility]), pharm D for dosing  -- will need outpatient cefazolin once stable  -- END-DATE 6/22       Constipation  - no BM since 3/18  - continue senna and miralax       Consultants and Procedures:     Consultants:  None    Procedures:    None    Transfer Information:     Diet:  Diabetic     Physical Activity:  PT/OT    To Do / Pending Studies / Follow ups:    - continue airborne/droplet isolation due to patient testing positive for COVID  - on room air   - continue insulin regimen   - continue to trend BMP due to TAHIR   - continue cefazolin for osteo of left foot   - continue bowel regimen     Patient has been accepted by Hospital Medicine Team IMG, who will assume care of the patient upon arrival to the floor from the ICU. Please contact ICU team with any concerns prior to arrival. Please contact Hospital Medicine at [phone] or [phone] (please do NOT leave a voicemail) when patient arrives to the floor.    Gerardo Pelaez MD  Hospital Medicine Staff  Pager: [phone]; Spectra: [phone]

## 2020-03-27 NOTE — PROGRESS NOTES
"[facility]  Critical Care Medicine  Progress Note    Patient Name: Indio Ryder Jr.  MRN: [id number]  Admission Date: 3/10/2020  Hospital Length of Stay: 17 days  Code Status: Full Code  Attending Provider: Deborah Aleman DO  Primary Care Provider: Lorena Thakur MD   Principal Problem: Suspected Covid-19 Virus Infection    Subjective:     HPI:  Per  HPI    "Mr. Ryder is 50 y/o with a history of poorly controlled DM2 ( last Ha1c 8.4 ) , DKA with coma, diabetic neuropathy, diabatic retinopathy and diabetic foot with multiple amputation who presents to ED with chief complaint of chills associated with nausea and vomiting for one day. He sates that yesterday he started to feel ill and he has been having chills associated with nauseas and vomiting. He also endorses poor appetite with decreased oral intake for the last week. Denies fever, abdominal pain, feet or leg pain, leg swelling, CP, SOB, dysuria. He a history of poorly control diabetes with amputation of his right big and 5th toes. He undergone left big toe detriment and imputation on 7/14/2019. He reports not feeling well for the last couple of weeks and he felt about to pass out on Mardi gras and his BP was in 90/50. He was seen by podiatry last  Thursday when he had his wound opened and he was prescribed doxycycline for 10 days.      In ED, he was afebrile, vitally stable. Lab, BS: 382, B-hydroxybutyrate 3.6, HCO3 22,  anion gap 18. WBC: 6.9, lactate: 1.2  X-ray of right food shows soft tissue edema concerning for osteomyelitis. He received single dose of vanc/zosyn and 2L IVF.     Overview/Hospital Course:  Admitted to . Started on insulin gtt and IVF infusion. Pt is currently treated with doxycyline for wound in left foot. ID and podiatry consulted. MRI with acute osteomyelitis of 4th metatarsal head and neck of left proximal phalanx. ID recommended cefazolin and podiatry plan for for amputation. Pt is refusing amputation " "but agree to have agressive washout if he keep spiking fever. Pt transitioned to subQ insulin and tolerating PO. Blood ux grwe Staph coagulase negative, TTE done and shows normal valves with no vegetations. Pt had drop in his SpO2 to 85 with need for 5L oxygen. Respiratory cx with many GPC and CXR shows b/l patchy infiltration. Pt still spiking fever and in acute respiratory failure. Concern for COVID-19, test is sent. "     Rapid response called on pt as oxygen requirements are slowly increasing. Pt with oxygen saturations of 88% on 8L NC. Pt transferred to CMICU and intubated the unit.     Hospital/ICU Course:  Patient initially admitted to hospital medicine for DKA and left foot osteomyelitis, on the night of 3/20 patient became hypoxic and requiring ICU admission. Patient suspected to have COVID, test pending. Patient was intubated started on steroid, hydroxychlorquine, and Azithromycin - all completed. Continued cefazolin, per ID recs for left foot osteo. Overall, improving on vent with decreasing vent requirements. DKA/HHS resolved with insuin gtt although still having adjusting insulin regimen for better control. On 3/25, patient tolerated SBT for 3 hours. Continuing to wean propofol and started precedex. Patient extubated 3/23.     Stable for stepdown to floor.     Interval History/Significant Events: Transitioned to room air. Stable for stepdown to the floor. Overnight, pt's SARS-CoV-2 testing returned positive.     Review of Systems   Constitutional: Positive for fatigue. Negative for chills, fever and unexpected weight change.   HENT: Negative for facial swelling, sneezing and sore throat.    Eyes: Negative for pain and visual disturbance.   Respiratory: Negative for cough, shortness of breath and wheezing.    Cardiovascular: Negative for chest pain and leg swelling.   Gastrointestinal: Negative for abdominal pain, diarrhea and vomiting.   Endocrine: Negative for polydipsia and polyuria.   Genitourinary: " Negative for decreased urine volume, difficulty urinating, dysuria and urgency.   Musculoskeletal: Negative for arthralgias and myalgias.   Skin: Negative for pallor, rash and wound.   Allergic/Immunologic: Negative for environmental allergies and food allergies.   Neurological: Positive for weakness. Negative for dizziness, light-headedness and headaches.   Psychiatric/Behavioral: Negative for confusion. The patient is not nervous/anxious.           Objective:     Vital Signs (Most Recent):  Temp: 97.8 °F (36.6 °C) (03/27/20 1200)  Pulse: 83 (03/27/20 1200)  Resp: 15 (03/27/20 1200)  BP: (!) 163/93 (03/27/20 1200)  SpO2: 95 % (03/27/20 1200) Vital Signs (24h Range):  Temp:  [97.7 °F (36.5 °C)-98.7 °F (37.1 °C)] 97.8 °F (36.6 °C)  Pulse:  [] 83  Resp:  [13-22] 15  SpO2:  [93 %-97 %] 95 %  BP: (148-200)/() 163/93  Arterial Line BP: (145-202)/(33-90) 202/90   Weight: 98.8 kg (217 lb 13 oz)  Body mass index is 28.74 kg/m².      Intake/Output Summary (Last 24 hours) at 3/27/2020 1300  Last data filed at 3/27/2020 0900  Gross per 24 hour   Intake 740 ml   Output 2900 ml   Net -2160 ml       Physical Exam    Vents:  Vent Mode: Spont (03/26/20 1218)  Ventilator Initiated: Yes (03/20/20 3719)  Set Rate: 0 BPM (03/26/20 1218)  Vt Set: 480 mL (03/26/20 1218)  Pressure Support: 0 cmH20 (03/26/20 1218)  PEEP/CPAP: 0 cmH20 (03/26/20 1218)  Oxygen Concentration (%): 30 (03/26/20 1218)  Peak Airway Pressure: 1.9 cmH2O (03/26/20 1218)  Plateau Pressure: 0 cmH20 (03/26/20 1218)  Total Ve: 6.99 mL (03/26/20 1218)  Negative Inspiratory Force (cm H2O): -26 (03/26/20 1117)  F/VT Ratio<105 (RSBI): (!) 54.64 (03/26/20 1117)  Lines/Drains/Airways     Central Venous Catheter Line                 Hemodialysis Catheter 03/20/20 1142 7 days    Permacath 03/20/20 1142 7 days    Trialysis (Dialysis) Catheter 03/21/20 0020 right internal jugular 6 days          Peripheral Intravenous Line                 Peripheral IV - Single Lumen  03/21/20 0144 20 G Right Hand 6 days              Significant Labs:    CBC/Anemia Profile:  Recent Labs   Lab 03/26/20  0320 03/27/20  0310   WBC 13.05* 11.84   HGB 10.1* 10.6*   HCT 34.7* 36.9*    326   MCV 98 98   RDW 13.2 13.2        Chemistries:  Recent Labs   Lab 03/26/20  0320  03/27/20  0004 03/27/20  0310 03/27/20  0848   *   < > 150* 150* 150*   K 4.3   < > 3.7 3.9 3.9   *   < > 112* 112* 112*   CO2 26   < > 26 28 26   BUN 45*   < > 40* 38* 36*   CREATININE 2.5*   < > 2.0* 1.9* 1.9*   CALCIUM 9.1   < > 9.2 9.3 9.2   ALBUMIN 1.9*   < > 2.0* 1.9* 2.0*   PROT 7.8  --   --  7.5  --    BILITOT 0.2  --   --  0.2  --    ALKPHOS 132  --   --  124  --    ALT <5*  --   --  <5*  --    AST 19  --   --  25  --    MG 2.6  --   --  2.3  --    PHOS 2.6*   < > 2.5* 2.5* 2.8    < > = values in this interval not displayed.       Recent Lab Results       03/27/20  1226   03/27/20  0904   03/27/20  0848   03/27/20  0539   03/27/20  0310        Albumin     2.0   1.9     Alkaline Phosphatase         124     ALT         <5     Anion Gap     12   10     AST         25     Baso #         0.02     Basophil%         0.2     BILIRUBIN TOTAL         0.2  Comment:  For infants and newborns, interpretation of results should be based  on gestational age, weight and in agreement with clinical  observations.  Premature Infant recommended reference ranges:  Up to 24 hours.............<8.0 mg/dL  Up to 48 hours............<12.0 mg/dL  3-5 days..................<15.0 mg/dL  6-29 days.................<15.0 mg/dL       BUN, Bld     36   38     Calcium     9.2   9.3     Chloride     112   112     CO2     26   28     Creatinine     1.9   1.9     CRP         18.2     D-Dimer         6.22  Comment:  The quantitative D-dimer assay should be used as an aid in   the diagnosis of deep vein thrombosis and pulmonary embolism  in patients with the appropriate presentation and clinical  history. The upper limit of the reference interval  and the clinical   cut off   point are identical. Causes of a positive (>0.50 mg/L FEU) D-Dimer   test  include, but are not limited to: DVT, PE, DIC, thrombolytic   therapy, anticoagulant therapy, recent surgery, trauma, or   pregnancy, disseminated malignancy, aortic aneurysm, cirrhosis,  and severe infection. False negative results may occur in   patients with distal DVT.       Differential Method         Automated     eGFR if      46.2   46.2     eGFR if non      39.9  Comment:  Calculation used to obtain the estimated glomerular filtration  rate (eGFR) is the CKD-EPI equation.      39.9  Comment:  Calculation used to obtain the estimated glomerular filtration  rate (eGFR) is the CKD-EPI equation.        Eos #         0.1     Eosinophil%         0.9     Glucose     168   180     Gran # (ANC)         9.4     Gran%         79.6     Hematocrit         36.9     Hemoglobin         10.6     Immature Grans (Abs)         0.04  Comment:  Mild elevation in immature granulocytes is non specific and   can be seen in a variety of conditions including stress response,   acute inflammation, trauma and pregnancy. Correlation with other   laboratory and clinical findings is essential.       Immature Granulocytes         0.3     Lymph #         1.4     Lymph%         11.7     Magnesium         2.3     MCH         28.0     MCHC         28.7     MCV         98     Mono #         0.9     Mono%         7.3     MPV         10.1     nRBC         0     Phosphorus     2.8   2.5     Platelets         326     POCT Glucose 146 198   171       Potassium     3.9   3.9     PROTEIN TOTAL         7.5     RBC         3.78     RDW         13.2     Sodium     150   150     Triglycerides         171  Comment:  The National Cholesterol Education Program (NCEP) has set the  following guidelines (reference values) for triglycerides:  Normal......................<150 mg/dL  Borderline High.............150-199  mg/dL  High........................200-499 mg/dL       WBC         11.84                      03/27/20  0004   03/26/20  2359   03/26/20  2033   03/26/20  1723   03/26/20  1718        Albumin 2.0     1.9       Alkaline Phosphatase               ALT               Anion Gap 12     9       AST               Baso #               Basophil%               BILIRUBIN TOTAL               BUN, Bld 40     43       Calcium 9.2     9.5       Chloride 112     116       CO2 26     30       Creatinine 2.0     2.1       CRP               D-Dimer               Differential Method               eGFR if  43.4     40.9       eGFR if non  37.5  Comment:  Calculation used to obtain the estimated glomerular filtration  rate (eGFR) is the CKD-EPI equation.        35.4  Comment:  Calculation used to obtain the estimated glomerular filtration  rate (eGFR) is the CKD-EPI equation.          Eos #               Eosinophil%               Glucose 197     175       Gran # (ANC)               Gran%               Hematocrit               Hemoglobin               Immature Grans (Abs)               Immature Granulocytes               Lymph #               Lymph%               Magnesium               MCH               MCHC               MCV               Mono #               Mono%               MPV               nRBC               Phosphorus 2.5     2.5       Platelets               POCT Glucose   196 198   173     Potassium 3.7     3.9       PROTEIN TOTAL               RBC               RDW               Sodium 150     155       Triglycerides               WBC                                  All pertinent labs within the past 24 hours have been reviewed.    Significant Imaging:  I have reviewed all pertinent imaging results/findings within the past 24 hours.      ABG  No results for input(s): PH, PO2, PCO2, HCO3, BE in the last 168 hours.  Assessment/Plan:     Ophtho  Uncontrolled type 2 diabetes mellitus  A1C 12. Home  meds insulin detemir 25U qhs, aspart 10 tidwm, victoza, and metformin    - HHS/DKA resolved  - BGs increasing again to 250s. increased detemir to 25u qhs + moderate intensity SSI  - Diabetic diet    Cardiac/Vascular  Mixed hyperlipidemia  - Continue statin    Renal/  TAHIR (acute kidney injury)  Patient's Cr elevated from his baseline. TAHIR most likely due to Pre-renal vs AIN from zosyn vs. vanc toxicity vs COVID induced nephropathy  -- kidney function slowly improving   -- urinalysis/urine electrolytes consistent with intrarenal disease  -- Making good urine   -- Strict I/O for now and low threshold for Nephrology   -- Avoid nephrotoxic agents  -- renally dose medications    ID  Osteomyelitis of left foot  -- Podiatry and ID following  -- s/p iv zosyn. Stopped Zosyn   -- On cefazolin per ID (MSSA), pharm D for dosing  -- will need outpatient cefazolin once stable    -- END-DATE 6/22    GI  Constipation  - no BM since 3/18  - continue senna and miralax     Other  * Suspected Covid-19 Virus Infection  Acute Hypoxic Respiratory Failure    -- DDx including: aspiration event vs multifocal bacterial pneumonia.   -- Hypoxemia may be ARDS related to underlying sepsis from uncontrolled osteomyelitis vs COVID  -- COVID-19 testing pending  - Infection Control notified  - Isolation: Airborne and Droplet Precautions  - Diagnostics (leukopenia, hyponatremia, hyperferritinemia, elevated troponin, elevated d-dimer, age, and comorbidities are significant predictors of poor clinical outcome)              - CBC no leukopenia or lymphonenia              - CMP with hyponatremia; now hypernatremic              - d-dimer, ferritin, troponin, CRP, LDH, Procalcitonin              - ECG non-ischemic              - rapid Flu neg              - RIP neg              - Legionella antigen              - Blood culture x2 NGTD              - CXR with bibasilar patchy infiltrates on arrival. Repeat s/p tube placement              - UA and culture  neg    Plan:              - Intubated, Sedated - tolerated SBT yesterday, will attempt again today with possible extubation.     - Vent requirements improving, currently on 30%/5 PEEP.               - Telemetry & Continuous Pulse Ox   - Follow up COVID result              - Methylprednisolone 40mg daily - complete               - hydroxychloroquine 5 day course  - complete    - Azithromycin 1500mg total - complete               - Empiric antibiotics for CAP/HCAP on arrival with zosyn, final day is 3/17, completed.          Critical Care Daily Checklist:    A: Awake: RASS Goal/Actual Goal: RASS Goal: 0-->alert and calm  Actual: [place]hpande Agitation Sedation Scale (RASS): Alert and calm   B: Spontaneous Breathing Trial Performed? Spon. Breathing Trial Initiated?: Initiated (03/26/20 0916)   C: SAT & SBT Coordinated?  n/a                      D: Delirium: CAM-ICU Overall CAM-ICU: Negative   E: Early Mobility Performed? Yes   F: Feeding Goal: Goals: Meet % EEN, EPN by RD f/u date  Status: Nutrition Goal Status: progressing towards goal   Current Diet Order   Procedures    Diet diabetic [facility]; 2000 Calorie     Order Specific Question:   Indicate patient location for additional diet options:     Answer:   [facility]     Order Specific Question:   Total calories:     Answer:   2000 Calorie      AS: Analgesia/Sedation n/a   T: Thromboembolic Prophylaxis Enoxaparin   H: HOB > 300 Yes   U: Stress Ulcer Prophylaxis (if needed) Discontinued   G: Glucose Control detemir 25 QHS   B: Bowel Function Stool Occurrence: 1   I: Indwelling Catheter (Lines & Beck) Necessity Removed in anticipation for stepdown   D: De-escalation of Antimicrobials/Pharmacotherapies Cefazolin for osteomyelitis    Plan for the day/ETD Stepdown to wards    Code Status:  Family/Goals of Care: Full Code  Will call and update.        Critical secondary to Patient has a condition that poses threat to life and bodily function: Severe  Respiratory Distress      Critical care was time spent personally by me on the following activities: development of treatment plan with patient or surrogate and bedside caregivers, discussions with consultants, evaluation of patient's response to treatment, examination of patient, ordering and performing treatments and interventions, ordering and review of laboratory studies, ordering and review of radiographic studies, pulse oximetry, re-evaluation of patient's condition. This critical care time did not overlap with that of any other provider or involve time for any procedures.     Behram Khan, MD  Critical Care Medicine  [facility]

## 2020-03-27 NOTE — PLAN OF CARE
Patient medically stable for stepdown at this time.      CM remains available for any further patient/family needs or concerns.        03/27/20 1256   Discharge Reassessment   Assessment Type Discharge Planning Reassessment   Do you have any problems affording any of your prescribed medications? No   Discharge Plan A Home Health;Home with family   DME Needed Upon Discharge  other (see comments)   Anticipated Discharge Disposition Home-Health   Can the patient/caregiver answer the patient profile reliably? Yes, cognitively intact       Maxim Sears RN MSN  Critical Care-   Ext. [phone]

## 2020-03-27 NOTE — ASSESSMENT & PLAN NOTE
A1C 12. Home meds insulin detemir 25U qhs, aspart 10 tidwm, victoza, and metformin    - HHS/DKA resolved  - BGs increasing again to 250s. increased detemir to 25u qhs + moderate intensity SSI  - Diabetic diet

## 2020-03-27 NOTE — PLAN OF CARE
Problem: Physical Therapy Goal  Goal: Physical Therapy Goal  Description  Goals to be met by: [id number]    Patient will increase functional independence with mobility by performin. Supine to sit with Set-up [place] - not met  2. Sit to stand transfer with minimum with RW -not met  3. Bed to chair transfer with minimum using with RW -not met  4. Gait  x 100 feet with Supervision to Stand-by Assistance using Rolling Walker. -not met  5. Ascend/descend 4 stairs with right Handrail Minimal Assistance using Rolling Walker. -not met  5. Lower extremity exercise program x10 reps per handout, with supervision -not met      Outcome: Ongoing, Progressing   Re-evaluation completed and goals appropriate. Tori Anderson, PT'  3/27/2020

## 2020-03-28 PROBLEM — U07.1 COVID-19 VIRUS INFECTION: Status: ACTIVE | Noted: 2020-03-15

## 2020-03-28 LAB
POCT GLUCOSE: 214 MG/DL (ref 70–110)
POCT GLUCOSE: 230 MG/DL (ref 70–110)
POCT GLUCOSE: 232 MG/DL (ref 70–110)
POCT GLUCOSE: 245 MG/DL (ref 70–110)
POCT GLUCOSE: 250 MG/DL (ref 70–110)

## 2020-03-28 PROCEDURE — 25000003 PHARM REV CODE 250: Performed by: STUDENT IN AN ORGANIZED HEALTH CARE EDUCATION/TRAINING PROGRAM

## 2020-03-28 PROCEDURE — 99232 SBSQ HOSP IP/OBS MODERATE 35: CPT | Mod: ,,, | Performed by: INTERNAL MEDICINE

## 2020-03-28 PROCEDURE — 97535 SELF CARE MNGMENT TRAINING: CPT

## 2020-03-28 PROCEDURE — 97168 OT RE-EVAL EST PLAN CARE: CPT

## 2020-03-28 PROCEDURE — 99232 PR SUBSEQUENT HOSPITAL CARE,LEVL II: ICD-10-PCS | Mod: ,,, | Performed by: INTERNAL MEDICINE

## 2020-03-28 PROCEDURE — 63600175 PHARM REV CODE 636 W HCPCS: Performed by: EMERGENCY MEDICINE

## 2020-03-28 PROCEDURE — 97110 THERAPEUTIC EXERCISES: CPT

## 2020-03-28 PROCEDURE — 63600175 PHARM REV CODE 636 W HCPCS: Performed by: NURSE PRACTITIONER

## 2020-03-28 PROCEDURE — 20600001 HC STEP DOWN PRIVATE ROOM

## 2020-03-28 PROCEDURE — 97165 OT EVAL LOW COMPLEX 30 MIN: CPT

## 2020-03-28 RX ADMIN — CEFAZOLIN 2 G: 1 INJECTION, POWDER, FOR SOLUTION INTRAMUSCULAR; INTRAVENOUS at 08:03

## 2020-03-28 RX ADMIN — CEFAZOLIN 2 G: 1 INJECTION, POWDER, FOR SOLUTION INTRAMUSCULAR; INTRAVENOUS at 01:03

## 2020-03-28 RX ADMIN — DOCUSATE SODIUM - SENNOSIDES 1 TABLET: 50; 8.6 TABLET, FILM COATED ORAL at 08:03

## 2020-03-28 RX ADMIN — INSULIN ASPART 4 UNITS: 100 INJECTION, SOLUTION INTRAVENOUS; SUBCUTANEOUS at 05:03

## 2020-03-28 RX ADMIN — ENOXAPARIN SODIUM 40 MG: 100 INJECTION SUBCUTANEOUS at 08:03

## 2020-03-28 RX ADMIN — METOPROLOL TARTRATE 50 MG: 50 TABLET, FILM COATED ORAL at 08:03

## 2020-03-28 RX ADMIN — POLYETHYLENE GLYCOL 3350 17 G: 17 POWDER, FOR SOLUTION ORAL at 08:03

## 2020-03-28 RX ADMIN — INSULIN ASPART 2 UNITS: 100 INJECTION, SOLUTION INTRAVENOUS; SUBCUTANEOUS at 11:03

## 2020-03-28 RX ADMIN — INSULIN ASPART 4 UNITS: 100 INJECTION, SOLUTION INTRAVENOUS; SUBCUTANEOUS at 08:03

## 2020-03-28 RX ADMIN — INSULIN ASPART 4 UNITS: 100 INJECTION, SOLUTION INTRAVENOUS; SUBCUTANEOUS at 12:03

## 2020-03-28 RX ADMIN — CEFAZOLIN 2 G: 1 INJECTION, POWDER, FOR SOLUTION INTRAMUSCULAR; INTRAVENOUS at 05:03

## 2020-03-28 NOTE — PT/OT/SLP RE-EVAL
"Occupational Therapy   Re-evaluation    Name: Indio Ryder Jr.  MRN: [id number]  Admitting Diagnosis:  COVID-19 virus infection      Recommendations:     Discharge Recommendations: home health OT  Discharge Equipment Recommendations:  (TBD closer to d/c)  Barriers to discharge:  None    Assessment:     Indio Ryder Jr. is a 49 y.o. male with a medical diagnosis of COVID-19 virus infection.  He presents with profound weakness affecting ADL (I).  Performance deficits affecting function are weakness, impaired self care skills, impaired balance, impaired functional mobilty, impaired endurance, gait instability, decreased safety awareness, impaired cardiopulmonary response to activity.      Rehab Prognosis:  Good; patient would benefit from acute skilled OT services to address these deficits and reach maximum level of function.       Plan:     Patient to be seen 5 x/week to address the above listed problems via self-care/home management, therapeutic activities, therapeutic exercises  · Plan of Care Expires: 04/27/20  · Plan of Care Reviewed with: patient    Subjective     Chief Complaint: "No pain no gain."  Patient/Family stated goals: to get better and go home  Communicated with: RN prior to session.  Pain/Comfort:  · Pain Rating 1: 0/10  · Pain Rating Post-Intervention 1: 0/10    Objective:     Communicated with: RN prior to session.  Patient found supine with: bed alarm, peripheral IV upon OT entry to room.    General Precautions: Standard, fall, contact, airborne, droplet   Orthopedic Precautions:N/A ; L LE wb'g through heel only per podiatry 2* diabetic foot ulcer  Braces:       Occupational Performance:    Bed Mobility:    · Patient completed Supine to Sit with minimum assistance  · Patient completed Sit to Supine with minimum assistance    Functional Mobility/Transfers:  · Patient completed Sit <> Stand Transfer with moderate assistance  with  no assistive device   · Functional Mobility: NT; unable to take " sidesteps    Activities of Daily Living:  · Feeding:  set-up A    · Grooming: minimum assistance    · Upper Body Dressing: moderate assistance    · Lower Body Dressing: total assistance    · Toileting: total assistance      Cognitive/Visual Perceptual:  Pt is alert and following commands  Oriented x 3    Physical Exam:  Postural examination/scapula alignment:    -      FFP seated EOB  Sensation:    -       Intact  Upper Extremity Range of Motion:     -       Right Upper Extremity: WFL  -       Left Upper Extremity: WFL  Upper Extremity Strength:    -       Right Upper Extremity: 3-/5  -       Left Upper Extremity: 3-/5   Strength:    -       Right Upper Extremity: Fair  -       Left Upper Extremity: Fair  Fine Motor Coordination:    -       Intact  Gross motor coordination:   WFL      AMPAC 6 Click:  AMPAC Total Score: 12    Treatment & Education:  Education:  Pt ed on OT POC  Pt sat EOB with close SBA for static sitting and CGA during dynamic tasks  Pt completed 10 reps of  B UE and B LE AAROM ex's   Pt stood from EOB with mod A with L ortho shoe donned   Pt maintained standing with minimal A x 30 seconds for pericare and linen change  Pt ed on ROM ex's 3x daily for increased overall strength and endurance    Patient left HOB elevated with all lines intact, call button in reach, bed alarm on and RN notified    GOALS:   Multidisciplinary Problems     Occupational Therapy Goals        Problem: Occupational Therapy Goal    Goal Priority Disciplines Outcome Interventions   Occupational Therapy Goal     OT, PT/OT Ongoing, Progressing    Description:  Goals to be met by: 4/7/20    Patient will increase functional independence with ADLs by performing:    UE Dressing with set-up A.  LE Dressing with Moderate assistance.  Grooming while standing with Minimal Assistance.  Toileting from bedside commode with Minimal A for hygiene and clothing management.   Transfer to bedside commode with Minimal A                        History:     Past Medical History:   Diagnosis Date    Actinomyces infection 1/17/2017    Right foot    Diabetic ketoacidosis without coma associated with type 2 diabetes mellitus 5/30/2017    Diabetic ulcer of right foot associated with type 2 diabetes mellitus 6/3/2015    Essential hypertension 6/6/2013    Group B streptococcal infection 1/13/2017    Mixed hyperlipidemia 8/12/2014    Shoulder impingement 8/12/2014    Subacute osteomyelitis of right foot 1/12/2017    Streptococcus agalactiae, Actinomyces odontolyticus    Traumatic amputation of fifth toe of right foot 07/02/2015    Type 2 diabetes mellitus with diabetic neuropathy, with long-term current use of insulin 5/3/2016    Ulcerative colitis, unspecified        Past Surgical History:   Procedure Laterality Date    DEBRIDEMENT OF FOOT Left 7/14/2019    Procedure: DEBRIDEMENT, FOOT, with left 5th ray amputation;  Surgeon: Sis Hickman DPM;  Location: [facility] OR [address] [place] [address];  Service: Podiatry;  Laterality: Left;    DEBRIDEMENT OF FOOT Left 7/17/2019    Procedure: DEBRIDEMENT, FOOT with leftt 5th ray partial amputation with Neox Graft;  Surgeon: Mai Burrell DPM;  Location: [facility] OR [address] [place] [address];  Service: Podiatry;  Laterality: Left;  t    TOE AMPUTATION Right 06/05/2015    TOE AMPUTATION Right 01/19/2017       Time Tracking:     OT Date of Treatment: 03/28/20  OT Start Time: 1008  OT Stop Time: 1036  OT Total Time (min): 28 min    Billable Minutes:Evaluation 8  Self Care/Home Management 10  Therapeutic Exercise 10    NATALIE Baltazar  3/28/2020

## 2020-03-28 NOTE — NURSING
Patient AAAx3; slow to respond but answers appropriately.  Remains on Room Air, stays in bed uses urinal.  Tolerating diet and drinking fluids.  Covered with insulin today. Dressings to wounds clean dry and intact.

## 2020-03-28 NOTE — PROGRESS NOTES
Hospital Medicine  Progress Note  [facility]      Patient Name: Indio Ryder Jr.  MRN:  [id number]  Hospital Medicine Team: [facility] [place] HOSP MED G Gerardo Pelaez MD  Date of Admission:  3/10/2020     Length of Stay:  LOS: 18 days       Principal Problem:  COVID-19 virus infection      HPI:  Mr. Ryder is 50 y/o with a history of poorly controlled DM2 ( last Ha1c 8.4 ) , DKA with coma, diabetic neuropathy, diabatic retinopathy and diabetic foot with multiple amputation who presents to ED with chief complaint of chills associated with nausea and vomiting for one day. He sates that yesterday he started to feel ill and he has been having chills associated with nauseas and vomiting. He also endorses poor appetite with decreased oral intake for the last week. Denies fever, abdominal pain, feet or leg pain, leg swelling, CP, SOB, dysuria. He a history of poorly control diabetes with amputation of his right big and 5th toes. He undergone left big toe detriment and imputation on 7/14/2019. He reports not feeling well for the last couple of weeks and he felt about to pass out on Mardi gras and his BP was in 90/50. He was seen by podiatry last  Thursday when he had his wound opened and he was prescribed doxycycline for 10 days.    Hospital Course:  Patient admitted for evaluation of possible COVID-19.    Interval History:     Patient lying in bed, no acute distress. Reports shortness of breath is improved and denies fever, cough, or chest pain. Reports pain controlled in left foot.     Review of Systems:  Constitutional: Positive for fever, chills, fatigue, poor appetite   HENT: Positive for sore throat, negative for trouble swallowing.    Eyes: Negative for photophobia, visual disturbance.   Respiratory: Positive for cough, shortness of breath  Cardiovascular: Negative for chest pain, palpitations, leg swelling.   Gastrointestinal: Negative for abdominal pain, constipation, nausea, vomiting.    Endocrine: Negative for cold intolerance, heat intolerance.   Genitourinary: Negative for dysuria, frequency.   Musculoskeletal: Negative for arthralgias, myalgias.   Skin: Negative for rash  Neurological: Negative for dizziness, syncope, light-headedness.   Psychiatric/Behavioral: Negative for confusion, hallucinations, anxiety      Inpatient Medications:    Current Facility-Administered Medications:     acetaminophen tablet 650 mg, 650 mg, Oral, Q8H PRN, Antonio Valladares MD, 650 mg at 03/14/20 2108    albuterol inhaler 4 puff, 4 puff, Inhalation, Q6H PRN, Roberta Reed MD    ceFAZolin injection 2 g, 2 g, Intravenous, Q8H, Jw Stanley MD, 2 g at 03/28/20 0846    dextrose 10 % infusion, , Intravenous, Continuous PRN, Mumtaz Saldana MD    dextrose 10% (D10W) Bolus, 12.5 g, Intravenous, PRN, Mumtaz Saldana MD    enoxaparin injection 40 mg, 40 mg, Subcutaneous, QHS, Gino Covington NP, 40 mg at 03/27/20 2056    glucagon (human recombinant) injection 1 mg, 1 mg, Intramuscular, PRN, Mumtaz Saldana MD    glucose chewable tablet 16 g, 16 g, Oral, PRN, Vicky Singh MD    glucose chewable tablet 24 g, 24 g, Oral, PRN, Vicky Singh MD    hydrALAZINE injection 10 mg, 10 mg, Intravenous, Q4H PRN, Poonam Acuña NP, 10 mg at 03/27/20 1529    insulin aspart U-100 pen 1-10 Units, 1-10 Units, Subcutaneous, Q4H PRN, Mumtaz Saldana MD, 4 Units at 03/28/20 0847    insulin detemir U-100 pen 25 Units, 25 Units, Subcutaneous, QHS, Mumtaz Saldana MD, 25 Units at 03/27/20 2057    metoprolol tartrate (LOPRESSOR) tablet 50 mg, 50 mg, Oral, BID, Mumtaz Saldana MD, 50 mg at 03/28/20 0846    ondansetron disintegrating tablet 8 mg, 8 mg, Oral, Q8H PRN, Vicky Singh MD, 8 mg at 03/11/20 1515    polyethylene glycol packet 17 g, 17 g, Oral, Daily, Mumtaz Saldana MD, 17 g at 03/28/20 0846    senna-docusate 8.6-50 mg per tablet 1 tablet, 1 tablet, Per OG tube, BID, Mumtaz Saldana MD, 1 tablet at  "03/28/20 0846    sodium chloride 0.9% flush 10 mL, 10 mL, Intravenous, PRN, Isaac Rudolph MD    Hospital course:  Patient initially admitted to hospital medicine for DKA and left foot osteomyelitis, on the night of 3/20 patient became hypoxic and requiring ICU admission. Patient suspected to have COVID, test pending. Patient was intubated started on steroid, hydroxychlorquine, and Azithromycin - all completed. Continued cefazolin, per ID recs for left foot osteo. Overall, improving on vent with decreasing vent requirements. DKA/HHS resolved with insuin gtt although still having adjusting insulin regimen for better control. On 3/25, patient tolerated SBT for 3 hours. Continuing to wean propofol and started precedex. Patient extubated 3/23.      Stable for stepdown to floor.     Physical Exam:      Intake/Output Summary (Last 24 hours) at 3/28/2020 1204  Last data filed at 3/28/2020 1016  Gross per 24 hour   Intake 240 ml   Output 1650 ml   Net -1410 ml     Wt Readings from Last 3 Encounters:   03/26/20 98.8 kg (217 lb 13 oz)   03/05/20 99.3 kg (219 lb)   02/06/20 99.8 kg (220 lb)       BP (!) 161/96 (BP Location: Left arm, Patient Position: Lying)   Pulse 73   Temp 97.6 °F (36.4 °C) (Oral)   Resp 16   Ht 6' 1" (1.854 m)   Wt 98.8 kg (217 lb 13 oz)   SpO2 98%   BMI 28.74 kg/m²     GEN: NAD, conversant  HEENT: NC/AT  CV:  deferred due to risk of COVID exposure and to conserve PPE  Resp: deferred due to risk of COVID exposure and to conserve PPE  Skin: no rash  Neuro: Moving all extremities spontaneously   Psych: Normal mood and affect         Laboratory:  Lab Results   Component Value Date    [id number] Detected (A) 03/15/2020       Recent Labs   Lab 03/25/20  0354 03/26/20  0320 03/27/20  0310   WBC 9.29 13.05* 11.84   LYMPH 15.3*  1.4 12.0*  1.6 11.7*  1.4   HGB 9.1* 10.1* 10.6*   HCT 30.9* 34.7* 36.9*   * 319 326     Recent Labs   Lab 03/25/20  0354  03/26/20  0320  03/27/20  0310 " 03/27/20  0848 03/27/20  1540   *   < > 150*   < > 150* 150* 145   K 4.2   < > 4.3   < > 3.9 3.9 4.4      < > 113*   < > 112* 112* 109   CO2 27   < > 26   < > 28 26 24   BUN 47*   < > 45*   < > 38* 36* 35*   CREATININE 2.6*   < > 2.5*   < > 1.9* 1.9* 1.8*   *   < > 262*   < > 180* 168* 261*   CALCIUM 8.4*   < > 9.1   < > 9.3 9.2 8.9   MG 2.7*  --  2.6  --  2.3  --   --    PHOS 2.3*   < > 2.6*   < > 2.5* 2.8 3.1    < > = values in this interval not displayed.     Recent Labs   Lab 03/25/20 0354 03/26/20 0320 03/27/20 0310 03/27/20  0848 03/27/20  1540   ALKPHOS 106  --  132  --  124  --   --    ALT <5*  --  <5*  --  <5*  --   --    AST 19  --  19  --  25  --   --    ALBUMIN 1.8*   < > 1.9*   < > 1.9* 2.0* 2.0*   PROT 7.1  --  7.8  --  7.5  --   --    BILITOT 0.2  --  0.2  --  0.2  --   --     < > = values in this interval not displayed.        Recent Labs     03/26/20 0320 03/27/20 0310   DDIMER 2.48* 6.22*   CRP 30.3* 18.2*       All labs within the last 24 hours were reviewed.     Microbiology:  Microbiology Results (last 7 days)     Procedure Component Value Units Date/Time    Blood culture [[id number]] Collected:  03/21/20 0125    Order Status:  Completed Specimen:  Blood from Peripheral, Wrist, Left Updated:  03/26/20 0612     Blood Culture, Routine No growth after 5 days.    Blood culture [[id number]]  (Abnormal) Collected:  03/21/20 0125    Order Status:  Completed Specimen:  Blood from Peripheral, Antecubital, Right Updated:  03/24/20 0958     Blood Culture, Routine Gram stain aer bottle: Gram positive cocci in clusters resembling Staph       Results called to and read back by:Melba Vázquez RN 03/22/2020  12:48      COAGULASE-NEGATIVE STAPHYLOCOCCUS SPECIES  Organism is a probable contaminant      Culture, Respiratory with Gram Stain [[id number]]  (Abnormal) Collected:  03/21/20 0511    Order Status:  Completed Specimen:  Respiratory from Tracheal Aspirate Updated:  03/23/20 1305      Respiratory Culture No S aureus or Pseudomonas isolated.      CANDIDA ALBICANS  Few  Normal respiratory janes also present       Gram Stain (Respiratory) <10 epithelial cells per low power field.     Gram Stain (Respiratory) Rare WBC's     Gram Stain (Respiratory) Rare Gram positive cocci            Imaging  ECG Results          EKG 12-lead (Final result)  Result time 03/10/20 12:44:04    Final result by Interface, Lab In [facility] (03/10/20 12:44:04)                 Narrative:    Test Reason : E86.0,    Vent. Rate : 103 BPM     Atrial Rate : 107 BPM     P-R Int : 120 ms          QRS Dur : 092 ms      QT Int : 370 ms       P-R-T Axes : 073 078 039 degrees     QTc Int : 485 ms    Sinus tachycardia  Otherwise normal ECG  When compared with ECG of 12-JUL-2019 10:07,  Premature ventricular complexes are no longer Present  Confirmed by BRITTON MARTELL MD ([id number]) on 3/10/2020 12:43:50 PM    Referred By: [email or web address]   SELF           Confirmed By:BRITTON MARTELL MD                              Results for orders placed during the hospital encounter of 03/10/20   Echo Color Flow Doppler? Yes    Narrative · Normal left ventricular systolic function. The estimated ejection   fraction is 55%.  · No wall motion abnormalities.  · Normal LV diastolic function.  · Mild left atrial enlargement.  · Normal right ventricular systolic function.  · Normal central venous pressure (3 mmHg).          X-Ray Abdomen AP 1 View  Narrative: EXAMINATION:  XR ABDOMEN AP 1 VIEW    CLINICAL HISTORY:  NGT placement;    TECHNIQUE:  AP View(s) of the abdomen was performed.    COMPARISON:  Non 03/21/2020 e    FINDINGS:  NG tube in the distal stomach area.  No significant bowel dilatation  Impression: See above    Electronically signed by: Chandan Lunsford MD  Date:    03/25/2020  Time:    11:33      All imaging within the last 24 hours was reviewed.     Assessment and Plan:    Active Hospital Problems    Diagnosis  POA    *COVID-19 virus infection [J22, B97.29]   Yes    COVID-19 virus detected [J22, B97.29]  Yes    Constipation [K59.00]  No    Osteomyelitis of left foot [M86.9]  Yes    Uncontrolled type 2 diabetes mellitus [E11.3213, E11.65, Z79.4]  Yes    TAHIR (acute kidney injury) [N17.9]  No    Mixed hyperlipidemia [E78.2]  Yes      Resolved Hospital Problems    Diagnosis Date Resolved POA    Diabetic ulcer of left midfoot associated with type 2 diabetes mellitus, with bone involvement without evidence of necrosis [E11.621, L97.426] 03/23/2020 Yes    Sepsis with acute organ dysfunction [A41.9, R65.20] 03/23/2020 No     Scheduled Meds:   ceFAZolin (ANCEF) IVPB  2 g Intravenous Q8H    enoxaparin  40 mg Subcutaneous QHS    insulin detemir U-100  30 Units Subcutaneous QHS    metoprolol tartrate  50 mg Oral BID    polyethylene glycol  17 g Oral Daily    senna-docusate 8.6-50 mg  1 tablet Per OG tube BID     Continuous Infusions:   dextrose 10 % in water (D10W)       PRN Meds:.acetaminophen, albuterol, dextrose 10 % in water (D10W), Dextrose 10% Bolus, glucagon (human recombinant), glucose, glucose, hydrALAZINE, insulin aspart U-100, ondansetron, sodium chloride 0.9%      PLAN:    Covid-19 Virus Infection  - COVID-19 testing sent on 3/15 which was positive on 3/26  - Infection Control notified    - Isolation:   - Airborne and Droplet Precautions  - N95 masks must be fit tested, wear eye protection  - 20 second hand hygiene   - Limit visitors per hospital policy   - Consolidating lab draws, nursing care, and interventions    - Diagnostics: (rising CRP, persistent lymphopenia, hyponatremia, hyperferritinemia, elevated troponin, elevated d-dimer, age, and comorbidities are significant predictors of poor clinical outcome)   - CBC: No leukopenia or lymphopenia  --   trend Q48hrs  - CMP: no hyponatremia      -- trend Q48hrs  - Procalcitonin:  - D-dimer: 6.22 -- trend Q48hrs  - Ferritin: 543 -- repeat prior to discharge  - CRP:  119 --> 18.2           --    trend  Q48hrs  - LDH: 332  - BNP: 231   - Troponin:  0.025    - ECG: sinus tachycardia    - rapid Flu: negative    - RIP: negative    - Legionella antigen: N/A   - Blood culture x2: CoNS   - Sputum culture: Candida Albicans (few), normal janes also present    - CXR: bibasilar patchy alveolar infiltrates    - UA and culture: negative       - Management:   - Bundle care as able to minimize in/out of room   - Supplemental O2 to maintain SpO2 >92%,   if requiring 6L NC or higher, place on nonrebreather and discuss case with MICU   - Telemetry & continuous Pulse Ox   - albuterol INHALER PRN 4puff Q6hr approximates a nebulizer (avoid nebulization of secretions)   - apap PRN fever   - Avoiding NIPPV to prevent aerosolization   (including home CPAP/BiPAP unless on a case-by-case basis and only in negative pressure room)   - Cautious use of NSAIDS for fever per WHO recommendations (3/16/2020)   - No new ACEi/ARB start or discontinuation of chronic med unless hypotensive (Esler et al. Journal of Hypertension 2020, 38:000-000)   - Careful use of steroids in the absence of other indications   - unless septic shock due to increased viral replication   - Fluid sparing resuscitation   - Empiric antibiotics per likely source & patient allergies    - CAP: x 5 day course (end date- 3/17)            Azithromycin 500mg IV day #1, then 250mg PO daily x4 days     Continuing cefazolin      Completed methylprednisolone                  If MRSA risk factors, add Vancomycin IV (PharmD consult)   - If patient meets criteria per Hospital Protocol    - start statin (if CPK WNL)    - completed HCQ 400mg PO BID x1 day, then 400mg PO daily x 4 days (check G6PD, ECG, and start Qshift POCT glucose)    Acute hypoxic/hypercapneic respiratory failure   - likely due to ARDS in the setting of COVID infection   - s/p extubation on 3/26  - on room air    DKA- resolved   Uncontrolled type 2 diabetes mellitus  - A1C 12.   - Home meds insulin detemir 25U qhs, aspart  10 tidwm, victoza, and metformin  - BGs increasing again to 250s.   - increased to detemir to 30u qhs + moderate intensity SSI. Titrate insulin as needed   - goal -180  - Diabetic diet     Mixed hyperlipidemia  - off statin     TAHIR- improving   - Scr 3.6 --> 1.8 (b/l Scr 0.9)  - likely due to Pre-renal vs AIN from zosyn vs. vanc toxicity vs COVID induced nephropathy  - Making good urine   - Strict I/O   - Avoid nephrotoxic agents  - renally dose medications     Osteomyelitis of left foot  - Podiatry and ID following  - s/p IV zosyn  - On cefazolin per ID (MSSA), pharm D for dosing (end date- 6/22/20)  - will need PICC for long term IV abx prior to discharge   - outpatient ID and podiatry followup     Constipation  - continue senna and miralax     Elevated triglycerides   - Triglycerides: 201 --> 156 --> 171  - trend triglycerides    Goals of care, counseling/discussion  - Reviewed the typical clinical course of COVID19 with Mr. Indio Ryder, including the potential for acute decompensation requiring intubation and mechanical ventilation  - Discussed again as part of routine daily evaluation, patient/POA maintains code status of FULL CODE    VTE High Risk Prophylaxis: enoxaparin 40mg sq QHS @ 2100 (bundled care) if GFR >30    Patient's chronic/stable medical conditions noted in the problem list above will be managed with the patient's home medications as tolerated.       Subsequent Hospital Care  Level 2 [id number] Total visit time was 25 minutes or greater with greater than 50% of time spent in counseling and coordination of care.        Gerardo Pelaez MD  Hospital Medicine Staff  Pager: [phone]; Spectra: [phone]

## 2020-03-28 NOTE — PLAN OF CARE
Problem: Occupational Therapy Goal  Goal: Occupational Therapy Goal  Description  Goals to be met by: 4/7/20    Patient will increase functional independence with ADLs by performing:    UE Dressing with set-up A.  LE Dressing with Moderate assistance.  Grooming while standing with Minimal Assistance.  Toileting from bedside commode with Minimal A for hygiene and clothing management.   Transfer to bedside commode with Minimal A      Outcome: Ongoing, Progressing   OT re-eval completed, and above goals established. Shey Gaspar, LOTR  3/28/2020

## 2020-03-29 LAB
ALBUMIN SERPL BCP-MCNC: 1.9 G/DL (ref 3.5–5.2)
ALP SERPL-CCNC: 111 U/L (ref 55–135)
ALT SERPL W/O P-5'-P-CCNC: <5 U/L (ref 10–44)
ANION GAP SERPL CALC-SCNC: 9 MMOL/L (ref 8–16)
AST SERPL-CCNC: 19 U/L (ref 10–40)
BILIRUB SERPL-MCNC: 0.2 MG/DL (ref 0.1–1)
BUN SERPL-MCNC: 18 MG/DL (ref 6–20)
CALCIUM SERPL-MCNC: 8.7 MG/DL (ref 8.7–10.5)
CHLORIDE SERPL-SCNC: 107 MMOL/L (ref 95–110)
CO2 SERPL-SCNC: 28 MMOL/L (ref 23–29)
CREAT SERPL-MCNC: 1.3 MG/DL (ref 0.5–1.4)
EST. GFR  (AFRICAN AMERICAN): >60 ML/MIN/1.73 M^2
EST. GFR  (NON AFRICAN AMERICAN): >60 ML/MIN/1.73 M^2
GLUCOSE SERPL-MCNC: 204 MG/DL (ref 70–110)
POCT GLUCOSE: 185 MG/DL (ref 70–110)
POCT GLUCOSE: 192 MG/DL (ref 70–110)
POCT GLUCOSE: 202 MG/DL (ref 70–110)
POCT GLUCOSE: 231 MG/DL (ref 70–110)
POTASSIUM SERPL-SCNC: 3.4 MMOL/L (ref 3.5–5.1)
PROT SERPL-MCNC: 6.8 G/DL (ref 6–8.4)
SODIUM SERPL-SCNC: 144 MMOL/L (ref 136–145)
TRIGL SERPL-MCNC: 164 MG/DL (ref 30–150)

## 2020-03-29 PROCEDURE — 80053 COMPREHEN METABOLIC PANEL: CPT

## 2020-03-29 PROCEDURE — 63600175 PHARM REV CODE 636 W HCPCS: Performed by: EMERGENCY MEDICINE

## 2020-03-29 PROCEDURE — 20600001 HC STEP DOWN PRIVATE ROOM

## 2020-03-29 PROCEDURE — 25000003 PHARM REV CODE 250: Performed by: INTERNAL MEDICINE

## 2020-03-29 PROCEDURE — 84478 ASSAY OF TRIGLYCERIDES: CPT

## 2020-03-29 PROCEDURE — 25000003 PHARM REV CODE 250: Performed by: STUDENT IN AN ORGANIZED HEALTH CARE EDUCATION/TRAINING PROGRAM

## 2020-03-29 PROCEDURE — 99232 PR SUBSEQUENT HOSPITAL CARE,LEVL II: ICD-10-PCS | Mod: ,,, | Performed by: INTERNAL MEDICINE

## 2020-03-29 PROCEDURE — 99232 SBSQ HOSP IP/OBS MODERATE 35: CPT | Mod: ,,, | Performed by: INTERNAL MEDICINE

## 2020-03-29 PROCEDURE — 63600175 PHARM REV CODE 636 W HCPCS: Performed by: NURSE PRACTITIONER

## 2020-03-29 RX ORDER — CHLORTHALIDONE 25 MG/1
25 TABLET ORAL DAILY
Status: DISCONTINUED | OUTPATIENT
Start: 2020-03-29 | End: 2020-03-29

## 2020-03-29 RX ORDER — POTASSIUM CHLORIDE 20 MEQ/1
40 TABLET, EXTENDED RELEASE ORAL ONCE
Status: COMPLETED | OUTPATIENT
Start: 2020-03-29 | End: 2020-03-29

## 2020-03-29 RX ORDER — DIPHENHYDRAMINE HCL 25 MG
25 CAPSULE ORAL EVERY 6 HOURS PRN
Status: DISCONTINUED | OUTPATIENT
Start: 2020-03-29 | End: 2020-03-30 | Stop reason: HOSPADM

## 2020-03-29 RX ORDER — AMLODIPINE BESYLATE 10 MG/1
10 TABLET ORAL DAILY
Status: DISCONTINUED | OUTPATIENT
Start: 2020-03-29 | End: 2020-03-30 | Stop reason: HOSPADM

## 2020-03-29 RX ADMIN — CEFAZOLIN 2 G: 1 INJECTION, POWDER, FOR SOLUTION INTRAMUSCULAR; INTRAVENOUS at 04:03

## 2020-03-29 RX ADMIN — AMLODIPINE BESYLATE 10 MG: 10 TABLET ORAL at 02:03

## 2020-03-29 RX ADMIN — INSULIN ASPART 2 UNITS: 100 INJECTION, SOLUTION INTRAVENOUS; SUBCUTANEOUS at 09:03

## 2020-03-29 RX ADMIN — INSULIN ASPART 4 UNITS: 100 INJECTION, SOLUTION INTRAVENOUS; SUBCUTANEOUS at 11:03

## 2020-03-29 RX ADMIN — ENOXAPARIN SODIUM 40 MG: 100 INJECTION SUBCUTANEOUS at 09:03

## 2020-03-29 RX ADMIN — DIPHENHYDRAMINE HYDROCHLORIDE 25 MG: 25 CAPSULE ORAL at 04:03

## 2020-03-29 RX ADMIN — INSULIN ASPART 2 UNITS: 100 INJECTION, SOLUTION INTRAVENOUS; SUBCUTANEOUS at 07:03

## 2020-03-29 RX ADMIN — POTASSIUM CHLORIDE 40 MEQ: 1500 TABLET, EXTENDED RELEASE ORAL at 02:03

## 2020-03-29 RX ADMIN — CEFAZOLIN 2 G: 1 INJECTION, POWDER, FOR SOLUTION INTRAMUSCULAR; INTRAVENOUS at 01:03

## 2020-03-29 RX ADMIN — CEFAZOLIN 2 G: 1 INJECTION, POWDER, FOR SOLUTION INTRAMUSCULAR; INTRAVENOUS at 07:03

## 2020-03-29 RX ADMIN — METOPROLOL TARTRATE 50 MG: 50 TABLET, FILM COATED ORAL at 09:03

## 2020-03-29 RX ADMIN — INSULIN ASPART 2 UNITS: 100 INJECTION, SOLUTION INTRAVENOUS; SUBCUTANEOUS at 04:03

## 2020-03-29 RX ADMIN — ACETAMINOPHEN 650 MG: 325 TABLET ORAL at 05:03

## 2020-03-29 RX ADMIN — DOCUSATE SODIUM - SENNOSIDES 1 TABLET: 50; 8.6 TABLET, FILM COATED ORAL at 09:03

## 2020-03-29 NOTE — PROGRESS NOTES
Hospital Medicine  Progress Note  [facility]      Patient Name: Indio Ryder Jr.  MRN:  [id number]  Hospital Medicine Team: [facility] HOSP MED G Gerardo Pelaez MD  Date of Admission:  3/10/2020     Length of Stay:  LOS: 19 days       Principal Problem:  COVID-19 virus infection      HPI:  Mr. Ryder is 48 y/o with a history of poorly controlled DM2 ( last Ha1c 8.4 ) , DKA with coma, diabetic neuropathy, diabatic retinopathy and diabetic foot with multiple amputation who presents to ED with chief complaint of chills associated with nausea and vomiting for one day. He sates that yesterday he started to feel ill and he has been having chills associated with nauseas and vomiting. He also endorses poor appetite with decreased oral intake for the last week. Denies fever, abdominal pain, feet or leg pain, leg swelling, CP, SOB, dysuria. He a history of poorly control diabetes with amputation of his right big and 5th toes. He undergone left big toe detriment and imputation on 7/14/2019. He reports not feeling well for the last couple of weeks and he felt about to pass out on Mardi gras and his BP was in 90/50. He was seen by podiatry last  Thursday when he had his wound opened and he was prescribed doxycycline for 10 days.    Hospital Course:  Patient admitted for evaluation of possible COVID-19.    Interval History:     Overnight: no acute events   Patient lying in bed, no acute distress. Denies shortness of breath, cough, left foot pain, diarrhea or fever.     Review of Systems:  Constitutional: Negative for fever, chills, fatigue, poor appetite   HENT: negative for trouble swallowing.    Eyes: Negative for photophobia, visual disturbance.   Respiratory: Negative for cough, shortness of breath  Cardiovascular: Negative for chest pain, palpitations, leg swelling.   Gastrointestinal: Negative for abdominal pain, constipation, nausea, vomiting.   Endocrine: Negative for cold intolerance, heat  intolerance.   Genitourinary: Negative for dysuria, frequency.   Musculoskeletal: Negative for arthralgias, myalgias.   Skin: Negative for rash  Neurological: Negative for dizziness, syncope, light-headedness.   Psychiatric/Behavioral: Negative for confusion, hallucinations, anxiety      Inpatient Medications:    Current Facility-Administered Medications:     acetaminophen tablet 650 mg, 650 mg, Oral, Q8H PRN, Antonio Valladares MD, 650 mg at 03/29/20 0530    albuterol inhaler 4 puff, 4 puff, Inhalation, Q6H PRN, Roberta Reed MD    ceFAZolin injection 2 g, 2 g, Intravenous, Q8H, Jw Stanley MD, 2 g at 03/29/20 0755    dextrose 10 % infusion, , Intravenous, Continuous PRN, Mumtaz Saldana MD    dextrose 10% (D10W) Bolus, 12.5 g, Intravenous, PRN, Mumtaz Saldana MD    enoxaparin injection 40 mg, 40 mg, Subcutaneous, QHS, Gino Covington NP, 40 mg at 03/28/20 2037    glucagon (human recombinant) injection 1 mg, 1 mg, Intramuscular, PRN, Mumtaz Saldana MD    glucose chewable tablet 16 g, 16 g, Oral, PRN, Vicky Singh MD    glucose chewable tablet 24 g, 24 g, Oral, PRN, Vicky Singh MD    hydrALAZINE injection 10 mg, 10 mg, Intravenous, Q4H PRN, Poonam Acuña NP, 10 mg at 03/27/20 1529    insulin aspart U-100 pen 1-10 Units, 1-10 Units, Subcutaneous, Q4H PRN, Mumtaz Saldana MD, 4 Units at 03/29/20 1159    insulin detemir U-100 pen 30 Units, 30 Units, Subcutaneous, QHS, Gerardo Pelaez MD, 30 Units at 03/28/20 2047    metoprolol tartrate (LOPRESSOR) tablet 50 mg, 50 mg, Oral, BID, Mumtaz Saldana MD, 50 mg at 03/29/20 0900    ondansetron disintegrating tablet 8 mg, 8 mg, Oral, Q8H PRN, Vicky Singh MD, 8 mg at 03/11/20 1515    polyethylene glycol packet 17 g, 17 g, Oral, Daily, Mumtaz Saldana MD, 17 g at 03/28/20 0846    senna-docusate 8.6-50 mg per tablet 1 tablet, 1 tablet, Per OG tube, BID, Mumtaz Saldana MD, 1 tablet at 03/29/20 0900    sodium chloride 0.9% flush 10 mL,  "10 mL, Intravenous, PRN, Isaac Rudolph MD      Physical Exam:      Intake/Output Summary (Last 24 hours) at 3/29/2020 1238  Last data filed at 3/29/2020 0800  Gross per 24 hour   Intake 1000 ml   Output 2481 ml   Net -1481 ml     Wt Readings from Last 3 Encounters:   03/26/20 98.8 kg (217 lb 13 oz)   03/05/20 99.3 kg (219 lb)   02/06/20 99.8 kg (220 lb)       BP (!) 178/107 (BP Location: Right arm, Patient Position: Lying)   Pulse 70   Temp 96.4 °F (35.8 °C) (Axillary)   Resp 18   Ht 6' 1" (1.854 m)   Wt 98.8 kg (217 lb 13 oz)   SpO2 96%   BMI 28.74 kg/m²     GEN: NAD, conversant  HEENT: NC/AT  Cardio: deferred due to risk of COVID exposure and to conserve PPE  Resp: deferred due to risk of COVID exposure and to conserve PPE  Skin: no rash  Neuro: Moving all extremities spontaneously   Psych: Normal mood and affect       Laboratory:  Lab Results   Component Value Date    [id number] Detected (A) 03/15/2020       Recent Labs   Lab 03/25/20  0354 03/26/20  0320 03/27/20  0310   WBC 9.29 13.05* 11.84   LYMPH 15.3*  1.4 12.0*  1.6 11.7*  1.4   HGB 9.1* 10.1* 10.6*   HCT 30.9* 34.7* 36.9*   * 319 326     Recent Labs   Lab 03/25/20  0354  03/26/20  0320  03/27/20  0310 03/27/20  0848 03/27/20  1540 03/29/20  0955   *   < > 150*   < > 150* 150* 145 144   K 4.2   < > 4.3   < > 3.9 3.9 4.4 3.4*      < > 113*   < > 112* 112* 109 107   CO2 27   < > 26   < > 28 26 24 28   BUN 47*   < > 45*   < > 38* 36* 35* 18   CREATININE 2.6*   < > 2.5*   < > 1.9* 1.9* 1.8* 1.3   *   < > 262*   < > 180* 168* 261* 204*   CALCIUM 8.4*   < > 9.1   < > 9.3 9.2 8.9 8.7   MG 2.7*  --  2.6  --  2.3  --   --   --    PHOS 2.3*   < > 2.6*   < > 2.5* 2.8 3.1  --     < > = values in this interval not displayed.     Recent Labs   Lab 03/26/20  0320  03/27/20  0310 03/27/20  0848 03/27/20  1540 03/29/20  0955   ALKPHOS 132  --  124  --   --  111   ALT <5*  --  <5*  --   --  <5*   AST 19  --  25  --   --  19   ALBUMIN " 1.9*   < > 1.9* 2.0* 2.0* 1.9*   PROT 7.8  --  7.5  --   --  6.8   BILITOT 0.2  --  0.2  --   --  0.2    < > = values in this interval not displayed.        Recent Labs     03/27/20  0310   DDIMER 6.22*   CRP 18.2*       All labs within the last 24 hours were reviewed.     Microbiology:  Microbiology Results (last 7 days)     Procedure Component Value Units Date/Time    Blood culture [[id number]] Collected:  03/21/20 0125    Order Status:  Completed Specimen:  Blood from Peripheral, Wrist, Left Updated:  03/26/20 0612     Blood Culture, Routine No growth after 5 days.    Blood culture [[id number]]  (Abnormal) Collected:  03/21/20 0125    Order Status:  Completed Specimen:  Blood from Peripheral, Antecubital, Right Updated:  03/24/20 0958     Blood Culture, Routine Gram stain aer bottle: Gram positive cocci in clusters resembling Staph       Results called to and read back by:Melba Vázquez RN 03/22/2020  12:48      COAGULASE-NEGATIVE STAPHYLOCOCCUS SPECIES  Organism is a probable contaminant      Culture, Respiratory with Gram Stain [[id number]]  (Abnormal) Collected:  03/21/20 0511    Order Status:  Completed Specimen:  Respiratory from Tracheal Aspirate Updated:  03/23/20 1305     Respiratory Culture No S aureus or Pseudomonas isolated.      CANDIDA ALBICANS  Few  Normal respiratory janes also present       Gram Stain (Respiratory) <10 epithelial cells per low power field.     Gram Stain (Respiratory) Rare WBC's     Gram Stain (Respiratory) Rare Gram positive cocci            Imaging  ECG Results          EKG 12-lead (Final result)  Result time 03/10/20 12:44:04    Final result by Interface, Lab In [facility] (03/10/20 12:44:04)                 Narrative:    Test Reason : E86.0,    Vent. Rate : 103 BPM     Atrial Rate : 107 BPM     P-R Int : 120 ms          QRS Dur : 092 ms      QT Int : 370 ms       P-R-T Axes : 073 078 039 degrees     QTc Int : 485 ms    Sinus tachycardia  Otherwise normal ECG  When compared with ECG of  12-JUL-2019 10:07,  Premature ventricular complexes are no longer Present  Confirmed by BRITTON MARTELL MD ([id number]) on 3/10/2020 12:43:50 PM    Referred By: MOYERR   SELF           Confirmed By:BRITTON MARTELL MD                              Results for orders placed during the hospital encounter of 03/10/20   Echo Color Flow Doppler? Yes    Narrative · Normal left ventricular systolic function. The estimated ejection   fraction is 55%.  · No wall motion abnormalities.  · Normal LV diastolic function.  · Mild left atrial enlargement.  · Normal right ventricular systolic function.  · Normal central venous pressure (3 mmHg).          X-Ray Abdomen AP 1 View  Narrative: EXAMINATION:  XR ABDOMEN AP 1 VIEW    CLINICAL HISTORY:  NGT placement;    TECHNIQUE:  AP View(s) of the abdomen was performed.    COMPARISON:  Non 03/21/2020 e    FINDINGS:  NG tube in the distal stomach area.  No significant bowel dilatation  Impression: See above    Electronically signed by: Chandan Lunsford MD  Date:    03/25/2020  Time:    11:33      All imaging within the last 24 hours was reviewed.     Assessment and Plan:    Active Hospital Problems    Diagnosis  POA    *COVID-19 virus infection [J22, B97.29]  Yes    COVID-19 virus detected [J22, B97.29]  Yes    Constipation [K59.00]  No    Osteomyelitis of left foot [M86.9]  Yes    Uncontrolled type 2 diabetes mellitus [E11.3213, E11.65, Z79.4]  Yes    TAHIR (acute kidney injury) [N17.9]  No    Mixed hyperlipidemia [E78.2]  Yes      Resolved Hospital Problems    Diagnosis Date Resolved POA    Diabetic ulcer of left midfoot associated with type 2 diabetes mellitus, with bone involvement without evidence of necrosis [E11.621, L97.426] 03/23/2020 Yes    Sepsis with acute organ dysfunction [A41.9, R65.20] 03/23/2020 No     Scheduled Meds:   amLODIPine  10 mg Oral Daily    ceFAZolin (ANCEF) IVPB  2 g Intravenous Q8H    enoxaparin  40 mg Subcutaneous QHS    insulin detemir U-100  30 Units  Subcutaneous QHS    metoprolol tartrate  50 mg Oral BID    polyethylene glycol  17 g Oral Daily    potassium chloride  40 mEq Oral Once    senna-docusate 8.6-50 mg  1 tablet Per OG tube BID     Continuous Infusions:   dextrose 10 % in water (D10W)       PRN Meds:.acetaminophen, albuterol, dextrose 10 % in water (D10W), Dextrose 10% Bolus, glucagon (human recombinant), glucose, glucose, hydrALAZINE, insulin aspart U-100, ondansetron, sodium chloride 0.9%    PLAN:  Covid-19 Virus Infection  - COVID-19 testing: Collection Date: 3/15/2020 Collection Time:   1:15 PM  - Infection Control notified    - Isolation:   - Airborne and Droplet Precautions  - Surgical mask on patient   - N95 masks must be fit tested, wear eye protection  - 20 second hand hygiene   - Limit visitors per hospital policy   - Consolidate lab draws, nursing care, and interventions    - Diagnostics: (rising CRP, persistent lymphopenia, hyponatremia, hyperferritinemia, elevated troponin, elevated d-dimer, age, and comorbidities are significant predictors of poor clinical outcome)              - CBC: No leukopenia or lymphopenia  --   trend Q48hrs  - CMP: no hyponatremia         --          trend Q48hrs  - Procalcitonin:  - D-dimer: 6.22            --          trend Q48hrs  - Ferritin: 543   --          repeat prior to discharge  - CRP:  119 --> 18.2           --    trend Q48hrs  - LDH: 332  - BNP: 231   - Troponin:  0.025                     - ECG: sinus tachycardia               - rapid Flu: negative               - RIP: negative               - Legionella antigen: N/A              - Blood culture x2: CoNS              - Sputum culture: Candida Albicans (few), normal janes also present               - CXR: bibasilar patchy alveolar infiltrates               - UA and culture: negative     - Management:   Bundle care as able to maintain isolation & minimize in/out of room   - Supplemental O2 to maintain SpO2 92%-96%   if requiring 6L NC or higher,  place on nonrebreather and discuss case with MICU   - Telemetry & continuous pulse oximetry    - If wheezing   - albuterol inhaler 2-4 puff Q6hr PRN    - ipratropium daily    - acetaminophen 650mg PO Q6hr PRN fever   - loperamide PRN for viral diarrhea  - Empiric antibiotics per likely source & patient allergies    - CAP: x 5 day course (d/c early if low concern for bacterial co-infection)       Completed Azithromycin 500mg IV day #1, then 250mg PO daily x4 days                                      Continuing cefazolin                                       Completed methylprednisolone      If azithromycin is not available, start doxycycline                 If MRSA risk factors, add Vancomycin IV (PharmD consult)   - Investigational Treatment Protocol: (if patient meets criteria)   [email or web address]     - statin: atorvastatin 40mg po daily (if CPK WNL)    - completed HCQ 400mg PO BID x1 day, then 400mg PO daily x 4 days   (check glucose 6 phosphate dehydrogenase (NOT G6PD Quant), ECG, and start Qshift POCT glucose)    Safety notes:   - Avoid NIPPV (CPAP/BiPAP) to prevent aerosolization, use on a case-by-case basis if in neg pressure room   - Cautious use of NSAIDS for fever per WHO recommendations (3/16/2020)   - No new ACEi/ARB start or discontinuation of chronic med unless hypotensive (Esler et al. Journal of Hypertension 2020, 38:000-000)   - Careful use of steroids in the absence of other indications (shock, ARDS)   - Fluid sparing resuscitation, avoid maintenance fluids     Acute hypoxic/hypercapneic respiratory failure- resolved    - likely due to ARDS in the setting of COVID infection   - s/p extubation on 3/26  - sating well on room air     DKA- resolved   Uncontrolled type 2 diabetes mellitus  - A1C 12.   - Home meds insulin detemir 25U qhs, aspart 10 tidwm, victoza, and metformin  - BGs increasing again to  250s.   - Detemir to 30u qhs + moderate intensity SSI. Titrate insulin as needed   - goal -180  - Diabetic diet    HTN  - continue home metoprolol 50 mg BID and amlodipine 10 mg daily   -  If BP remains elevated, can consider thiazide, ACE or ARB once TAHIR resolves       TAHIR- improving   - Scr 3.6 --> 1.8--> 1.3 (b/l Scr 0.9)  - likely due to Pre-renal vs AIN from zosyn vs. vanc toxicity vs COVID induced nephropathy  - Making good urine   - Strict I/O   - Avoid nephrotoxic agents  - renally dose medications     Osteomyelitis of left foot  - Podiatry and ID following  - s/p IV zosyn  - On cefazolin per ID (MSSA), pharm D for dosing (end date- 6/22/20)  - will need PICC for long term IV abx prior to discharge   - outpatient ID and podiatry followup    Mixed hyperlipidemia  - off statin      Constipation  - continue senna and miralax      Elevated triglycerides   - Triglycerides: 201 --> 156 --> 171  - trend triglycerides       Advance Care Planning  Goals of care, counseling/discussion: FULL code   Advance Care Planning       VTE High Risk Prophylaxis: enoxaparin 40mg sq QHS     Patient's chronic/stable medical conditions noted in the problem list above will be managed with the patient's home medications as tolerated.       Subsequent Inpatient Hospital Care    Level 2 50213 Total visit time was 25 minutes or greater with greater than 50% of time spent in         Gerardo Pelaez MD  Hospital Medicine Staff  Pager: [phone]; Spectra: [phone]

## 2020-03-29 NOTE — PLAN OF CARE
Pt is awake, alert, and oriented. VSS. Pt can turn in bed independently and repositions in bed with one person assistance. Pt's breathing is unlabored on room air. PRN acetaminophen given for headache. RN will continue to monitor. Pt is sinus rhythm on telemetry and remains free of falls.

## 2020-03-29 NOTE — PLAN OF CARE
Problem: Wound  Goal: Optimal Wound Healing  Outcome: Ongoing, Progressing     Problem: Fall Injury Risk  Goal: Absence of Fall and Fall-Related Injury  Outcome: Ongoing, Progressing     Problem: Adult Inpatient Plan of Care  Goal: Plan of Care Review  Outcome: Ongoing, Progressing  Goal: Absence of Hospital-Acquired Illness or Injury  Outcome: Ongoing, Progressing  Goal: Optimal Comfort and Wellbeing  Outcome: Ongoing, Progressing  Goal: Readiness for Transition of Care  Outcome: Ongoing, Progressing     Problem: Diabetes Comorbidity  Goal: Blood Glucose Level Within Desired Range  Outcome: Ongoing, Progressing     Problem: Skin Injury Risk Increased  Goal: Skin Health and Integrity  Outcome: Ongoing, Progressing     Problem: Infection  Goal: Infection Symptom Resolution  Outcome: Ongoing, Progressing    Wound performed today; tolerated without difficulty; wound showing signs of healing; pink and moist with clean edges.   Bed Alarm in place; sat on bedside today to facilitate cleaning  Instructed patient necessity to remain in bed until determined he can sit in chair by PT.  Glucose monitoring continued today; covered with insulin at every meal today   ABX ongoing; MD in room today to discuss discharge with PICC Line and ABx Therapy for a few months.

## 2020-03-29 NOTE — NURSING
Wound Care done per orders.  Patient tolerated without difficulty.  Patient able to assist with pulling himself up in bed.

## 2020-03-30 ENCOUNTER — TELEPHONE (OUTPATIENT)
Dept: WOUND CARE | Facility: CLINIC | Age: 50
End: 2020-03-30

## 2020-03-30 VITALS
OXYGEN SATURATION: 95 % | HEART RATE: 87 BPM | WEIGHT: 217.81 LBS | DIASTOLIC BLOOD PRESSURE: 82 MMHG | BODY MASS INDEX: 28.87 KG/M2 | RESPIRATION RATE: 18 BRPM | TEMPERATURE: 98 F | SYSTOLIC BLOOD PRESSURE: 158 MMHG | HEIGHT: 73 IN

## 2020-03-30 PROBLEM — K59.00 CONSTIPATION: Status: RESOLVED | Noted: 2020-03-25 | Resolved: 2020-03-30

## 2020-03-30 PROBLEM — N17.9 AKI (ACUTE KIDNEY INJURY): Status: RESOLVED | Noted: 2017-05-30 | Resolved: 2020-03-30

## 2020-03-30 LAB
ALBUMIN SERPL BCP-MCNC: 2.1 G/DL (ref 3.5–5.2)
ALP SERPL-CCNC: 113 U/L (ref 55–135)
ALT SERPL W/O P-5'-P-CCNC: <5 U/L (ref 10–44)
ANION GAP SERPL CALC-SCNC: 8 MMOL/L (ref 8–16)
AST SERPL-CCNC: 22 U/L (ref 10–40)
BASOPHILS # BLD AUTO: 0.02 K/UL (ref 0–0.2)
BASOPHILS NFR BLD: 0.3 % (ref 0–1.9)
BILIRUB SERPL-MCNC: 0.2 MG/DL (ref 0.1–1)
BUN SERPL-MCNC: 14 MG/DL (ref 6–20)
CALCIUM SERPL-MCNC: 8.6 MG/DL (ref 8.7–10.5)
CHLORIDE SERPL-SCNC: 104 MMOL/L (ref 95–110)
CO2 SERPL-SCNC: 29 MMOL/L (ref 23–29)
CREAT SERPL-MCNC: 1.2 MG/DL (ref 0.5–1.4)
CRP SERPL-MCNC: 8.1 MG/L (ref 0–8.2)
D DIMER PPP IA.FEU-MCNC: 3.09 MG/L FEU
DIFFERENTIAL METHOD: ABNORMAL
EOSINOPHIL # BLD AUTO: 0.1 K/UL (ref 0–0.5)
EOSINOPHIL NFR BLD: 1.3 % (ref 0–8)
ERYTHROCYTE [DISTWIDTH] IN BLOOD BY AUTOMATED COUNT: 12.8 % (ref 11.5–14.5)
EST. GFR  (AFRICAN AMERICAN): >60 ML/MIN/1.73 M^2
EST. GFR  (NON AFRICAN AMERICAN): >60 ML/MIN/1.73 M^2
FERRITIN SERPL-MCNC: 736 NG/ML (ref 20–300)
GLUCOSE SERPL-MCNC: 157 MG/DL (ref 70–110)
HCT VFR BLD AUTO: 36.2 % (ref 40–54)
HGB BLD-MCNC: 10.9 G/DL (ref 14–18)
IMM GRANULOCYTES # BLD AUTO: 0.03 K/UL (ref 0–0.04)
IMM GRANULOCYTES NFR BLD AUTO: 0.5 % (ref 0–0.5)
LYMPHOCYTES # BLD AUTO: 1.2 K/UL (ref 1–4.8)
LYMPHOCYTES NFR BLD: 19.8 % (ref 18–48)
MAGNESIUM SERPL-MCNC: 1.7 MG/DL (ref 1.6–2.6)
MCH RBC QN AUTO: 28.9 PG (ref 27–31)
MCHC RBC AUTO-ENTMCNC: 30.1 G/DL (ref 32–36)
MCV RBC AUTO: 96 FL (ref 82–98)
MONOCYTES # BLD AUTO: 0.6 K/UL (ref 0.3–1)
MONOCYTES NFR BLD: 10.4 % (ref 4–15)
NEUTROPHILS # BLD AUTO: 4.1 K/UL (ref 1.8–7.7)
NEUTROPHILS NFR BLD: 67.7 % (ref 38–73)
NRBC BLD-RTO: 0 /100 WBC
PHOSPHATE SERPL-MCNC: 2 MG/DL (ref 2.7–4.5)
PLATELET # BLD AUTO: 239 K/UL (ref 150–350)
PMV BLD AUTO: 10.7 FL (ref 9.2–12.9)
POCT GLUCOSE: 167 MG/DL (ref 70–110)
POCT GLUCOSE: 172 MG/DL (ref 70–110)
POCT GLUCOSE: 96 MG/DL (ref 70–110)
POTASSIUM SERPL-SCNC: 3.6 MMOL/L (ref 3.5–5.1)
PROT SERPL-MCNC: 7.2 G/DL (ref 6–8.4)
RBC # BLD AUTO: 3.77 M/UL (ref 4.6–6.2)
SODIUM SERPL-SCNC: 141 MMOL/L (ref 136–145)
TRIGL SERPL-MCNC: 167 MG/DL (ref 30–150)
WBC # BLD AUTO: 6.07 K/UL (ref 3.9–12.7)

## 2020-03-30 PROCEDURE — 99239 HOSP IP/OBS DSCHRG MGMT >30: CPT | Mod: ,,, | Performed by: INTERNAL MEDICINE

## 2020-03-30 PROCEDURE — 83735 ASSAY OF MAGNESIUM: CPT

## 2020-03-30 PROCEDURE — C1751 CATH, INF, PER/CENT/MIDLINE: HCPCS

## 2020-03-30 PROCEDURE — 25000003 PHARM REV CODE 250: Performed by: STUDENT IN AN ORGANIZED HEALTH CARE EDUCATION/TRAINING PROGRAM

## 2020-03-30 PROCEDURE — 84100 ASSAY OF PHOSPHORUS: CPT

## 2020-03-30 PROCEDURE — 85379 FIBRIN DEGRADATION QUANT: CPT

## 2020-03-30 PROCEDURE — 63600175 PHARM REV CODE 636 W HCPCS: Performed by: EMERGENCY MEDICINE

## 2020-03-30 PROCEDURE — 80053 COMPREHEN METABOLIC PANEL: CPT

## 2020-03-30 PROCEDURE — 36415 COLL VENOUS BLD VENIPUNCTURE: CPT

## 2020-03-30 PROCEDURE — 85025 COMPLETE CBC W/AUTO DIFF WBC: CPT

## 2020-03-30 PROCEDURE — 86140 C-REACTIVE PROTEIN: CPT

## 2020-03-30 PROCEDURE — 82728 ASSAY OF FERRITIN: CPT

## 2020-03-30 PROCEDURE — 84478 ASSAY OF TRIGLYCERIDES: CPT

## 2020-03-30 PROCEDURE — 25000003 PHARM REV CODE 250: Performed by: INTERNAL MEDICINE

## 2020-03-30 PROCEDURE — 76937 US GUIDE VASCULAR ACCESS: CPT

## 2020-03-30 PROCEDURE — 99239 PR HOSPITAL DISCHARGE DAY,>30 MIN: ICD-10-PCS | Mod: ,,, | Performed by: INTERNAL MEDICINE

## 2020-03-30 PROCEDURE — 36573 INSJ PICC RS&I 5 YR+: CPT

## 2020-03-30 PROCEDURE — 97530 THERAPEUTIC ACTIVITIES: CPT

## 2020-03-30 RX ORDER — METOPROLOL TARTRATE 50 MG/1
50 TABLET ORAL 2 TIMES DAILY
Qty: 60 TABLET | Refills: 11 | Status: SHIPPED | OUTPATIENT
Start: 2020-03-30 | End: 2020-04-08

## 2020-03-30 RX ORDER — CEFAZOLIN SODIUM 1 G/3ML
2 INJECTION, POWDER, FOR SOLUTION INTRAMUSCULAR; INTRAVENOUS EVERY 8 HOURS
Start: 2020-03-30 | End: 2020-04-22

## 2020-03-30 RX ORDER — SODIUM CHLORIDE 0.9 % (FLUSH) 0.9 %
10 SYRINGE (ML) INJECTION
Status: DISCONTINUED | OUTPATIENT
Start: 2020-03-30 | End: 2020-03-30 | Stop reason: HOSPADM

## 2020-03-30 RX ORDER — AMLODIPINE BESYLATE 10 MG/1
10 TABLET ORAL DAILY
Qty: 30 TABLET | Refills: 11 | Status: SHIPPED | OUTPATIENT
Start: 2020-03-31 | End: 2020-04-05

## 2020-03-30 RX ORDER — SODIUM CHLORIDE 0.9 % (FLUSH) 0.9 %
10 SYRINGE (ML) INJECTION EVERY 6 HOURS
Status: DISCONTINUED | OUTPATIENT
Start: 2020-03-30 | End: 2020-03-30 | Stop reason: HOSPADM

## 2020-03-30 RX ADMIN — CEFAZOLIN 2 G: 1 INJECTION, POWDER, FOR SOLUTION INTRAMUSCULAR; INTRAVENOUS at 02:03

## 2020-03-30 RX ADMIN — CEFAZOLIN 2 G: 1 INJECTION, POWDER, FOR SOLUTION INTRAMUSCULAR; INTRAVENOUS at 09:03

## 2020-03-30 RX ADMIN — CEFAZOLIN 2 G: 1 INJECTION, POWDER, FOR SOLUTION INTRAMUSCULAR; INTRAVENOUS at 05:03

## 2020-03-30 RX ADMIN — METOPROLOL TARTRATE 50 MG: 50 TABLET, FILM COATED ORAL at 09:03

## 2020-03-30 NOTE — PLAN OF CARE
"ALIYAH spoke with Ela with [facility] and was told that pt was on their service PTA.  ALIYAH asked if they had changed pt's dressings since he did not have HH, and Ela stated that she would "check on that" and call ALIYAH back.  ALIYAH notified CM and will continue to F/U.          Hanna Snyder LMSW  "

## 2020-03-30 NOTE — PLAN OF CARE
Pt to be d/c home with home health - waiting on pt transport to bring pt to garage via wheelchair. PICC line placed today with OK to use in the chart. Peripheral IV removed - catheter intact. AVS printed and given to pt - pt verbalized understanding of all contents of the AVS packet and denied any questions or concerns at the time of d/c. Pharmacy delivered medications to pt's bedside. Home health set up. Pt collected his belongings in room. Pt in no apparent distress.

## 2020-03-30 NOTE — PLAN OF CARE
[facility]    HOME HEALTH ORDERS  FACE TO FACE ENCOUNTER    Patient Name: Indio Ryder Jr.  YOB: 1970    PCP: Lorena hTakur MD   PCP Address: [address] / [place] [address]  PCP Phone Number: [phone]  PCP Fax: [phone]    Encounter Date: 03/30/2020    Admit to Home Health    Diagnoses:  Active Hospital Problems    Diagnosis  POA    *COVID-19 virus infection [J22, B97.29]  Yes    COVID-19 virus detected [J22, B97.29]  Yes    Osteomyelitis of left foot [M86.9]  Yes    Uncontrolled type 2 diabetes mellitus [E11.3213, E11.65, Z79.4]  Yes    Mixed hyperlipidemia [E78.2]  Yes      Resolved Hospital Problems    Diagnosis Date Resolved POA    Constipation [K59.00] 03/30/2020 No    Diabetic ulcer of left midfoot associated with type 2 diabetes mellitus, with bone involvement without evidence of necrosis [E11.621, L97.426] 03/23/2020 Yes    Sepsis with acute organ dysfunction [A41.9, R65.20] 03/23/2020 No    TAHIR (acute kidney injury) [N17.9] 03/30/2020 No       Future Appointments   Date Time Provider Department Center   4/1/2020  9:00 AM LAB, APPOINTMENT [facility] JADIEL [facility] LAB IM [address] W   4/8/2020  9:30 AM JESUS Bernardo, RONNY [facility] IM [facility] [address] W   4/14/2020 10:00 AM Mai Burrell DPM [facility] POD [address] [address]           I have seen and examined this patient face to face today. My clinical findings that support the need for the home health skilled services and home bound status are the following:  Weakness/numbness causing balance and gait disturbance due to Infection and Weakness/Debility making it taxing to leave home.    Allergies:Review of patient's allergies indicates:  No Known Allergies    Diet: diabetic diet: 2000 calorie    Activities: activity as tolerated    Nursing:   SN to complete comprehensive assessment including routine vital signs. Instruct on disease process and s/s of complications to report to MD. Review/verify  medication list sent home with the patient at time of discharge  and instruct patient/caregiver as needed. Frequency may be adjusted depending on start of care date.    Notify MD if SBP > 160 or < 90; DBP > 90 or < 50; HR > 120 or < 50; Temp > 101      CONSULTS:    Physical Therapy to evaluate and treat. Evaluate for home safety and equipment needs; Establish/upgrade home exercise program. Perform / instruct on therapeutic exercises, gait training, transfer training, and Range of Motion.  Occupational Therapy to evaluate and treat. Evaluate home environment for safety and equipment needs. Perform/Instruct on transfers, ADL training, ROM, and therapeutic exercises.    MISCELLANEOUS CARE:  Home Infusion Therapy:   SN to perform Infusion Therapy/Central Line Care.  Review Central Line Care & Central Line Flush with patient.    Administer (drug and dose):  ceFAZolin injection 2 g, Intravenous, Q8H  Last dose to be given: 4/22/2020          Please collect weekly CBC, CMP, ESR and CRP and fax the results to ID clinic.                  Scrub the Hub: Prior to accessing the line, always perform a 30 second alcohol scrub  Each lumen of the central line is to be flushed at least daily with 10 mL Normal Saline and 3 mL Heparin flush (10 units/mL)  Skilled Nurse (SN) may draw blood from IV access  Blood Draw Procedure:   - Aspirate at least 5 mL of blood   - Discard   - Obtain specimen   - Change injection cap   - Flush with 20 mL Normal Saline followed by a                 3-5 mL Heparin flush (10 units/mL)  Central :   - Sterile dressing changes are done weekly and as needed.   - Use chlor-hexadine scrub to cleanse site, apply Biopatch to insertion site,       apply securement device dressing   - Injection caps are changed weekly and after EVERY lab draw.   - If sterile gauze is under dressing to control oozing,                 dressing change must be performed every 24 hours until gauze is not  needed.  Diabetic Care:   SN to perform and educate Diabetic management with blood glucose monitoring:, Fingerstick blood sugar AC and HS and Report CBG < 60 or > 350 to physician.    WOUND CARE ORDERS  Dressing changes to Left foot every day as follows:  Rinse wound with saline, pat dry.  Pack with betadine soaked gauze, dress with 4x4's, ABD, kerlix, and ace.        Medications: Review discharge medications with patient and family and provide education.      Current Discharge Medication List      START taking these medications    Details   amLODIPine (NORVASC) 10 MG tablet Take 1 tablet (10 mg total) by mouth once daily.  Qty: 30 tablet, Refills: 11      ceFAZolin (ANCEF) 1 gram injection Inject 2,000 mg (2 g total) into the vein every 8 (eight) hours. for 23 days      metoprolol tartrate (LOPRESSOR) 50 MG tablet Take 1 tablet (50 mg total) by mouth 2 (two) times daily.  Qty: 60 tablet, Refills: 11         CONTINUE these medications which have CHANGED    Details   insulin detemir U-100 (LEVEMIR FLEXTOUCH) 100 unit/mL (3 mL) SubQ InPn pen Inject 30 Units into the skin every evening.  Qty: 30 mL, Refills: 3         CONTINUE these medications which have NOT CHANGED    Details   atorvastatin (LIPITOR) 80 MG tablet Take 1 tablet (80 mg total) by mouth once daily.  Qty: 90 tablet, Refills: 3      gabapentin (NEURONTIN) 600 MG tablet Take 2 tablets (1,200 mg total) by mouth 2 (two) times daily.  Qty: 120 tablet, Refills: 3    Associated Diagnoses: Paresthesia of hand, bilateral; Type 2 diabetes mellitus with hyperglycemia, with long-term current use of insulin      insulin aspart U-100 (NOVOLOG) 100 unit/mL (3 mL) InPn pen Inject 10 units w/ meals plus scale 150-200+2, 201-250+4, 251-300+6, 301-350+8, >350+10. Snack dose 5 units.  Light meal cut back to 5 units. Max daily 60 units.  Qty: 2 Box, Refills: 6      liraglutide 0.6 mg/0.1 mL, 18 mg/3 mL, subq PNIJ (VICTOZA 2-JOYCE) 0.6 mg/0.1 mL (18 mg/3 mL) PnIj Inject 0.6 mg  "into the skin daily week 1 and 1.2 mg daily week 2 and on.  Qty: 6 mL, Refills: 6      losartan (COZAAR) 25 MG tablet Take 0.5 tablets (12.5 mg total) by mouth once daily. Holds if SBP is less than 120.  Qty: 45 tablet, Refills: 3      metFORMIN (GLUCOPHAGE) 500 MG tablet Take 2 tablets by mouth in the morning, and take 2 tablets by mouth at night.  Qty: 360 tablet, Refills: 3      ONETOUCH DELICA LANCETS 33 gauge Misc       ONETOUCH ULTRA2 kit       pen needle, diabetic 31 gauge x 5/16" Ndle Use to inject insulin four times daily  Qty: 100 each, Refills: 3         STOP taking these medications       doxycycline (MONODOX) 100 MG capsule Comments:   Reason for Stopping:               I certify that this patient is confined to his home and needs intermittent skilled nursing care, physical therapy and occupational therapy.        "

## 2020-03-30 NOTE — DISCHARGE INSTRUCTIONS
Your test was POSITIVE for COVID-19 (coronavirus).     [facility]  Preventing the Spread of Coronavirus Disease 2019 in Homes and Residential Communities      Prevention steps for people with confirmed or suspected COVID-19 (including persons under investigation) who do not need to be hospitalized and people with confirmed COVID-19 who were hospitalized and determined to be medically stable to go home.    Your healthcare provider and public health staff will evaluate whether you can be cared for at home.   Stay home except to get medical care.   Separate yourself from other people and animals in your home   Call ahead before visiting your doctor.   Wear a facemask.   Cover your coughs and sneezes.   Clean your hands often.   Avoid sharing personal household items.   Clean all high-touch surfaces every day.   Monitor your symptoms. Seek prompt medical attention if your illness is worsening (e.g., difficulty breathing). Before seeking care, call your healthcare provider.   If you have a medical emergency and need to call 911, notify the dispatch personnel that you have, or are being evaluated for COVID-19. If possible, put on a facemask before emergency medical services arrive.   Discontinuing home isolation. Call your provider about guidance to discontinue home isolation.    Recommended precautions for household members, intimate partners, and caregivers in a nonhealthcare setting of a patient with symptomatic laboratory-confirmed COVID-19 or a patient under investigation.  Household members, intimate partners, and caregivers in a nonhealthcare setting may have close contact with a person with symptomatic, laboratory-confirmed COVID-19 or a person under investigation. Close contacts should monitor their health; they should call their healthcare provider right away if they develop symptoms suggestive of COVID-19 (e.g., fever, cough, shortness of breath).    Close contacts  should also follow these recommendations:   Make sure that you understand and can help the patient follow their healthcare provider's instructions for medication(s) and care. You should help the patient with basic needs in the home and provide support for getting groceries, prescriptions, and other personal needs.   Monitor the patient's symptoms. If the patient is getting sicker, call his or her healthcare provider and tell them that the patient has laboratory-confirmed COVID-19. This will help the healthcare provider's office take steps to keep other people in the office or waiting room from getting infected. Ask the healthcare provider to call the local or [facility] health department for additional guidance. If the patient has a medical emergency and you need to call 911, notify the dispatch personnel that the patient has, or is being evaluated for COVID-19.   Household members should stay in another room or be  from the patient as much as possible. Household members should use a separate bedroom and bathroom, if available.   Prohibit visitors who do not have an essential need to be in the home.   Household members should care for any pets in the home. Do not handle pets or other animals while sick.   Make sure that shared spaces in the home have good air flow, such as by an air conditioner or an opened window, weather permitting.   Perform hand hygiene frequently. Wash your hands often with soap and water for at least 20 seconds or use an alcohol-based hand  that contains 60 to 95% alcohol, covering all surfaces of your hands and rubbing them together until they feel dry. Soap and water should be used preferentially if hands are visibly dirty.   Avoid touching your eyes, nose, and mouth with unwashed hands.   The patient should wear a facemask when you are around other people. If the patient is not able to wear a facemask (for example, because it causes trouble breathing), you, as the  caregiver should wear a mask when you are in the same room as the patient.   Wear a disposable facemask and gloves when you touch or have contact with the patient's blood, stool, or body fluids, such as saliva, sputum, nasal mucus, vomit, urine.  o Throw out disposable facemasks and gloves after using them. Do not reuse.  o When removing personal protective equipment, first remove and dispose of gloves. Then, immediately clean your hands with soap and water or alcohol-based hand . Next, remove and dispose of facemask, and immediately clean your hands again with soap and water or alcohol-based hand .   Avoid sharing household items with the patient. You should not share dishes, drinking glasses, cups, eating utensils, towels, bedding, or other items. After the patient uses these items, you should wash them thoroughly (see below Wash laundry thoroughly).   Clean all high-touch surfaces, such as counters, tabletops, doorknobs, bathroom fixtures, toilets, phones, keyboards, tablets, and bedside tables, every day. Also, clean any surfaces that may have blood, stool, or body fluids on them.   Use a household cleaning spray or wipe, according to the label instructions. Labels contain instructions for safe and effective use of the cleaning product including precautions you should take when applying the product, such as wearing gloves and making sure you have good ventilation during use of the product.   Wash laundry thoroughly.  o Immediately remove and wash clothes or bedding that have blood, stool, or body fluids on them.  o Wear disposable gloves while handling soiled items and keep soiled items away from your body. Clean your hands (with soap and water or an alcohol-based hand ) immediately after removing your gloves.  o Read and follow directions on labels of laundry or clothing items and detergent. In general, using a normal laundry detergent according to washing machine  instructions and dry thoroughly using the warmest temperatures recommended on the clothing label.   Place all used disposable gloves, facemasks, and other contaminated items in a lined container before disposing of them with other household waste. Clean your hands (with soap and water or an alcohol-based hand ) immediately after handling these items. Soap and water should be used preferentially if hands are visibly dirty.   Discuss any additional questions with your state or local health department or healthcare provider. Check available hours when contacting your local health department.    For more information see CDC link below.      https://www.cdc.gov/coronavirus/2019-ncov/hcp/guidance-prevent-spread.html#precautions              If your symptoms worsen or if you have any other concerns, please contact [facility] at [phone].     Sincerely,     Gerardo Pelaez MD

## 2020-03-30 NOTE — PLAN OF CARE
Call placed to [facility] ([phone]) to schedule dressing changes twice a week for Mr. Ryder. Patient states that he would prefer an appointment in [place] for wound care as he might stay with his sister, but he is ok with staying in [place] where he resides. CM was unable to get an appointment for patient in [place] but he will be able to be seen at the [facility]. Radha Dash (wound care) will call patient to arranges visits twice weekly. MD and SW notified of above conversation.      Gayatri Torres RN  Ext [phone]

## 2020-03-30 NOTE — PROCEDURES
"Indio Ryder Jr. is a 49 y.o. male patient.    Temp: 97.5 °F (36.4 °C) (03/30/20 0758)  Pulse: 88 (03/30/20 0842)  Resp: 18 (03/30/20 0758)  BP: 134/88 (03/30/20 0758)  SpO2: (!) 93 % (03/30/20 0758)  Weight: 98.8 kg (217 lb 13 oz) (03/26/20 1300)  Height: 6' 1" (185.4 cm) (03/26/20 1300)    PICC  Performed by: Chicho Slade RN  Assisting provider: Patrica Dallas RN  Time out: Immediately prior to procedure a time out was called to verify the correct patient, procedure, equipment, support staff and site/side marked as required  Indications: med administration and vascular access  Anesthesia: local infiltration  Local anesthetic: lidocaine 1% without epinephrine  Anesthetic Total (mL): 3  Preparation: skin prepped with ChloraPrep  Skin prep agent dried: skin prep agent completely dried prior to procedure  Sterile barriers: all five maximum sterile barriers used - cap, mask, sterile gown, sterile gloves, and large sterile sheet  Hand hygiene: hand hygiene performed prior to central venous catheter insertion  Location details: right brachial  Catheter type: double lumen  Catheter size: 5 Fr  Catheter Length: 38cm    Ultrasound guidance: yes  Vessel Caliber: medium and patent, compressibility normal  Needle advanced into vessel with real time Ultrasound guidance.  Image recorded and saved.  Number of attempts: 1  Post-procedure: blood return through all ports, chlorhexidine patch and sterile dressing applied  Technical procedures used: 3CG  Specimens: No  Implants: No  Assessment: placement verified by x-ray  Complications: none          Patrica Dallas  3/30/2020  "

## 2020-03-30 NOTE — PT/OT/SLP PROGRESS
Physical Therapy Treatment    Patient Name:  Indio Ryder Jr.   MRN:  [id number]    Recommendations:     Discharge Recommendations:  home with home health   Discharge Equipment Recommendations: patient has a RW    Barriers to discharge: None    Assessment:     Indio Ryder Jr. is a 49 y.o. male admitted with a medical diagnosis of COVID-19 virus infection.  He presents with the following impairments/functional limitations:  weakness, impaired functional mobilty, impaired endurance, gait instability, decreased lower extremity function, impaired cardiopulmonary response to activity . Per nursing patient will discharge today w/ home health. Patient reports he feels prepared for d/c and will d/c to his sister's home which is single level and w/o EARLINE. He reports he has a RW. Patient performed bed mobility w/o assistance. Transfer bed<>chair w/ CGA and able to take a couple of steps to transfer. He was able to perform sit<>stand at EOB w/ SBA and w/o AD..    Rehab Prognosis: Good; patient would benefit from acute skilled PT services to address these deficits and reach maximum level of function.    Recent Surgery: * No surgery found *      Plan:     During this hospitalization, patient to be seen 4 x/week to address the identified rehab impairments via gait training, therapeutic activities, therapeutic exercises and progress toward the following goals:    · Plan of Care Expires:  04/26/20    Subjective     Chief Complaint: none  Patient/Family Comments/goals: reports feels prepared to d/c home to his sister's house w/o EARLINE.  Pain/Comfort:  Pain Rating 1: 0/10  Pain Rating Post-Intervention 1: 0/10      Objective:     Communicated with nurse prior to session.  Patient found sitting EOB with telemetry upon PT entry to room.     General Precautions: Standard, fall, airborne, contact, droplet   Orthopedic Precautions:(weight bearing on left heel, darco shoe)   Braces: N/A     Functional Mobility:  · Bed Mobility:      · Sit to Supine: modified independence  · Transfers:     · Sit to Stand:  stand by assistance with no AD and from EOB, chair  · Bed to Chair: contact guard assistance with  no AD  using  Step Transfer and able to take approx 2 steps each transfer w/ use of UEs on bed and chair  · Gait: 2 steps to transfer, darco shoe on LLE and w/ weightbearing on heel. CGA. Patient educated on use of RW for gait and maintaining weightbearing on Left heel w/ darco shoe. Patient verbalizes understanding.       AM-PAC 6 CLICK MOBILITY  Turning over in bed (including adjusting bedclothes, sheets and blankets)?: 4  Sitting down on and standing up from a chair with arms (e.g., wheelchair, bedside commode, etc.): 3  Moving from lying on back to sitting on the side of the bed?: 4  Moving to and from a bed to a chair (including a wheelchair)?: 3  Need to walk in hospital room?: 2  Climbing 3-5 steps with a railing?: 1  Basic Mobility Total Score: 17       Therapeutic Activities and Exercises:   Patient performs 10x AP, LAQ, HF sitting EOB  Educated on PT POC, gait and trf tech, importance of heel weightbearing on R heel.  Sitting EOB, patient donns shirt w/o assistance, pants w/ min assistance to thread legs thru, doff/arnav darco shoe on LLE. Patient stands w/ SBA and manages pants.    Patient left supine with all lines intact and call button in reach..    GOALS:   Multidisciplinary Problems     Physical Therapy Goals        Problem: Physical Therapy Goal    Goal Priority Disciplines Outcome Goal Variances Interventions   Physical Therapy Goal     PT, PT/OT Ongoing, Progressing     Description:  Goals to be met by:     Patient will increase functional independence with mobility by performin. Supine to sit with Set-up [place] - 3/30/2020 met  2. Sit to stand transfer with minimum with RW -not met  3. Bed to chair transfer with minimum using with RW -not met  4. Gait  x 100 feet with Supervision to Stand-by Assistance  using Rolling Walker. -not met  5. Ascend/descend 4 stairs with right Handrail Minimal Assistance using Rolling Walker. -not met= discontinue 3/30/2020  5. Lower extremity exercise program x10 reps per handout, with supervision -not met                        Time Tracking:     PT Received On: 03/30/20  PT Start Time: 1441     PT Stop Time: 1505  PT Total Time (min): 24 min     Billable Minutes: Therapeutic Activity 24    Treatment Type: Treatment  PT/PTA: PT     PTA Visit Number: 0     Katie Smith, PT  03/30/2020

## 2020-03-30 NOTE — PT/OT/SLP PROGRESS
Occupational Therapy      Patient Name:  Indio ALVARADO Britni Toure.   MRN:  [id number]    Patient not seen today secondary to pt receiving PICC at this time. Will follow-up tomorrow.    NATALIE Lewis  3/30/2020

## 2020-03-30 NOTE — PLAN OF CARE
Problem: Adult Inpatient Plan of Care  Goal: Plan of Care Review  Outcome: Ongoing, Progressing   Patient AAOx4, VSS, afebrile. No acute changes overnight. Patient maintained SpO2 >96% on RA. No complaints of distress or pain. CBG AC/HS; sliding scale coverage administered per order. Dressing to L foot C/D/I. WCTM.

## 2020-03-30 NOTE — TELEPHONE ENCOUNTER
Contacted patient to notify him that message has been forwarded to Ramandeep and that she can return his call to schedule we she returns to clinic tomorrow. Pt verbalized understanding.  ----- Message from Rod Lawson sent at 3/30/2020  2:41 PM CDT -----  Contact: Pt      The Pt is discharging today and was told to F/U with wound care for the bottom of his left foot.    Phone # [phone]

## 2020-03-31 ENCOUNTER — TELEPHONE (OUTPATIENT)
Dept: WOUND CARE | Facility: CLINIC | Age: 50
End: 2020-03-31

## 2020-03-31 ENCOUNTER — TELEPHONE (OUTPATIENT)
Dept: PULMONOLOGY | Facility: CLINIC | Age: 50
End: 2020-03-31

## 2020-03-31 NOTE — PLAN OF CARE
Future Appointments   Date Time Provider Department Center   4/1/2020  9:00 AM LAB, APPOINTMENT [facility] LAB IM [facility] [address] [facility]   4/8/2020  9:30 AM JESUS Bernardo, RONNY [facility] IM Agustin [address] PCW   4/14/2020 10:00 AM Mai Burrell DPM [facility] POD [address]            03/31/20 1259   Final Note   Assessment Type Final Discharge Note   Anticipated Discharge Disposition Home-Health   What phone number can be called within the next 1-3 days to see how you are doing after discharge? [phone]   Hospital Follow Up  Appt(s) scheduled? Yes   Right Care Referral Info   Post Acute Recommendation Other   Facility Name [facility]   Post-Acute Status   Post-Acute Authorization Home Health   Home Health Status Set-up Complete  ([facility])   Discharge Delays None known at this time

## 2020-03-31 NOTE — TELEPHONE ENCOUNTER
----- Message from Gayle Larsen RN sent at 3/30/2020  3:17 PM CDT -----  Contact: Pt  Ramandeep,    Mr. Ryder tested COVID-19 positive on 03/15. Not sure what y'all are doing regarding positive patients and how far you're booking them.    Gayle  ----- Message -----  From: Rod Lawson  Sent: 3/30/2020   2:41 PM CDT  To: Ekta Garcia Staff        The Pt is discharging today and was told to F/U with wound care for the bottom of his left foot.    Phone # [phone]

## 2020-03-31 NOTE — DISCHARGE SUMMARY
Discharge Summary  Hospital Medicine  [facility]      Attending Physician on Discharge: Gerardo Pelaez MD  Hospital Medicine Team: [facility]  Date of Admission:  3/10/2020     Date of Discharge:  3/30/2020    Active Hospital Problems    Diagnosis  POA    *COVID-19 virus infection [J22, B97.29]  Yes    COVID-19 virus detected [J22, B97.29]  Yes    Osteomyelitis of left foot [M86.9]  Yes    Uncontrolled type 2 diabetes mellitus [E11.3213, E11.65, Z79.4]  Yes    Mixed hyperlipidemia [E78.2]  Yes      Resolved Hospital Problems    Diagnosis Date Resolved POA    Constipation [K59.00] 03/30/2020 No    Diabetic ulcer of left midfoot associated with type 2 diabetes mellitus, with bone involvement without evidence of necrosis [E11.621, L97.426] 03/23/2020 Yes    Sepsis with acute organ dysfunction [A41.9, R65.20] 03/23/2020 No    TAHIR (acute kidney injury) [N17.9] 03/30/2020 No       Consults: Podiatry, Infectious disease, Wound care      History of Present Illness:  Mr. Ryedr is 50 y/o with a history of poorly controlled DM2 ( last Ha1c 8.4 ) , DKA with coma, diabetic neuropathy, diabatic retinopathy and diabetic foot with multiple amputation who presents to ED with chief complaint of chills associated with nausea and vomiting for one day. He sates that yesterday he started to feel ill and he has been having chills associated with nauseas and vomiting. He also endorses poor appetite with decreased oral intake for the last week. Denies fever, abdominal pain, feet or leg pain, leg swelling, CP, SOB, dysuria. He a history of poorly control diabetes with amputation of his right big and 5th toes. He undergone left big toe detriment and imputation on 7/14/2019. He reports not feeling well for the last couple of weeks and he felt about to pass out on Mardi gras and his BP was in 90/50. He was seen by podiatry last  Thursday when he had his wound opened and he was prescribed doxycycline  for 10 days.       Hospital Course:     Indio Ryder Jr. was admitted to Hospital Medicine for treatment of suspected COVID-19 viral infection and was treated with supportive care following a comprehensive physical, radiographic, and lab evaluation tailored to the current standard of care for COVID19. Please review the admission H&P and the studies listed below for details. He improved with supportive care and was found to be suitable for discharge home without oxygen.    Additional details of the hospitalization include:    Acute hypoxic/hypercapneic respiratory failure- resolved    - likely due to ARDS in the setting of COVID infection   - s/p extubation on 3/26  - sating well on room air     DKA- resolved   Uncontrolled type 2 diabetes mellitus  - A1C 12.   - Home meds insulin detemir 25U qhs, aspart 10 tidwm, victoza, and metformin  - BGs increasing again to 250s.   - Detemir to 30u qhs + moderate intensity SSI. Titrate insulin as needed   - goal -180  - Diabetic diet     HTN  - continue home metoprolol 50 mg BID and amlodipine 10 mg daily      TAHIR- improving   - Scr 3.6 --> 1.8--> 1.2 (b/l Scr 0.9)  - likely due to Pre-renal vs AIN from zosyn vs. vanc toxicity vs COVID induced nephropathy  - Making good urine   - Strict I/O   - Avoid nephrotoxic agents  - renally dose medications     Osteomyelitis of left foot  - Podiatry and ID following  - s/p IV zosyn  - On cefazolin per ID (MSSA), pharm D for dosing (end date- 4/22/20)  - will need PICC for long term IV abx prior to discharge   - outpatient ID and podiatry followup     Mixed hyperlipidemia  - off statin      Constipation  - continue senna and miralax      Elevated triglycerides   - Triglycerides: 201 --> 156 --> 171 --> 167  - trend triglycerides    On the day of discharge, isolation precautions were reviewed at length verbally. The patient was also provided with written isolation guidelines modified from the [place] Department of Health and  [facility] as well as the [facility], as part of discharge paperwork. An updated phone number was obtained, which will be used to contact the patient when results are available, and positive patients will be enrolled in both the COVID-19 Home Symptom Monitoring program.    Laboratory Values:  Lab Results   Component Value Date    [id number] Detected (A) 03/15/2020       Recent Labs   Lab 03/26/20 0320 03/27/20  0310 03/30/20  0405   WBC 13.05* 11.84 6.07   LYMPH 12.0*  1.6 11.7*  1.4 19.8  1.2   HGB 10.1* 10.6* 10.9*   HCT 34.7* 36.9* 36.2*    326 239     Recent Labs   Lab 03/26/20 0320 03/27/20 0310 03/27/20  0848 03/27/20  1540 03/29/20  0955 03/30/20  0405   *   < > 150* 150* 145 144 141   K 4.3   < > 3.9 3.9 4.4 3.4* 3.6   *   < > 112* 112* 109 107 104   CO2 26   < > 28 26 24 28 29   BUN 45*   < > 38* 36* 35* 18 14   CREATININE 2.5*   < > 1.9* 1.9* 1.8* 1.3 1.2   *   < > 180* 168* 261* 204* 157*   CALCIUM 9.1   < > 9.3 9.2 8.9 8.7 8.6*   MG 2.6  --  2.3  --   --   --  1.7   PHOS 2.6*   < > 2.5* 2.8 3.1  --  2.0*    < > = values in this interval not displayed.     Recent Labs   Lab 03/27/20 0310 03/27/20  1540 03/29/20  0955 03/30/20  0405   ALKPHOS 124  --   --  111 113   ALT <5*  --   --  <5* <5*   AST 25  --   --  19 22   ALBUMIN 1.9*   < > 2.0* 1.9* 2.1*   PROT 7.5  --   --  6.8 7.2   BILITOT 0.2  --   --  0.2 0.2    < > = values in this interval not displayed.        Recent Labs     03/30/20  0405   DDIMER 3.09*   FERRITIN 736*   CRP 8.1         Microbiology:  Microbiology Results (last 7 days)     Procedure Component Value Units Date/Time    Blood culture [[id number]] Collected:  03/21/20 0125    Order Status:  Completed Specimen:  Blood from Peripheral, Wrist, Left Updated:  03/26/20 0612     Blood Culture, Routine No growth after 5 days.    Blood culture [[id number]]  (Abnormal) Collected:  03/21/20 0125    Order Status:  Completed Specimen:  Blood from Peripheral, Antecubital,  Right Updated:  03/24/20 0958     Blood Culture, Routine Gram stain aer bottle: Gram positive cocci in clusters resembling Staph       Results called to and read back by:Melba Vázquez RN 03/22/2020  12:48      COAGULASE-NEGATIVE STAPHYLOCOCCUS SPECIES  Organism is a probable contaminant            Imaging:  Imaging Results          X-Ray Foot Complete Left (Final result)  Result time 03/10/20 11:22:03    Final result by Vinnie Cortés III, MD (03/10/20 11:22:03)                 Narrative:    EXAMINATION:  XR FOOT COMPLETE 3 VIEW LEFT    CLINICAL HISTORY:  Non-pressure chronic ulcer of other part of left foot with unspecified severity    FINDINGS:  No fracture dislocation seen.  There is amputation of the 5th digit and partial amputation of the distal 5th metatarsal.  There is soft tissue swelling.  Osteomyelitis of the distal aspect of the remaining 5th metatarsal could not be excluded.  MRI could be helpful.      Electronically signed by: Vinnie Cortés MD  Date:    03/10/2020  Time:    11:22                              Cardiac:  ECG Results          EKG 12-lead (Final result)  Result time 03/10/20 12:44:04    Final result by Interface, Lab [facility] (03/10/20 12:44:04)                 Narrative:    Test Reason : E86.0,    Vent. Rate : 103 BPM     Atrial Rate : 107 BPM     P-R Int : 120 ms          QRS Dur : 092 ms      QT Int : 370 ms       P-R-T Axes : 073 078 039 degrees     QTc Int : 485 ms    Sinus tachycardia  Otherwise normal ECG  When compared with ECG of 12-JUL-2019 10:07,  Premature ventricular complexes are no longer Present  Confirmed by BRITTON MARTELL MD ([id number]) on 3/10/2020 12:43:50 PM    Referred By: [email or web address]   SELF           Confirmed By:BRITTON MARTELL MD                              Results for orders placed during the hospital encounter of 03/10/20   Echo Color Flow Doppler? Yes    Narrative · Normal left ventricular systolic function. The estimated ejection   fraction is 55%.  · No wall motion  abnormalities.  · Normal LV diastolic function.  · Mild left atrial enlargement.  · Normal right ventricular systolic function.  · Normal central venous pressure (3 mmHg).            Procedures:   * No surgery found *        Discharge Medication List as of 3/30/2020  5:45 PM      START taking these medications    Details   amLODIPine (NORVASC) 10 MG tablet Take 1 tablet (10 mg total) by mouth once daily., Starting Tue 3/31/2020, Until Wed 3/31/2021, Normal      ceFAZolin (ANCEF) 1 gram injection Inject 2,000 mg (2 g total) into the vein every 8 (eight) hours. for 23 days, Starting Mon 3/30/2020, Until Wed 4/22/2020, No Print      metoprolol tartrate (LOPRESSOR) 50 MG tablet Take 1 tablet (50 mg total) by mouth 2 (two) times daily., Starting Mon 3/30/2020, Until Tue 3/30/2021, Normal         CONTINUE these medications which have CHANGED    Details   insulin detemir U-100 (LEVEMIR FLEXTOUCH) 100 unit/mL (3 mL) SubQ InPn pen Inject 30 Units into the skin every evening., Starting Mon 3/30/2020, Normal         CONTINUE these medications which have NOT CHANGED    Details   atorvastatin (LIPITOR) 80 MG tablet Take 1 tablet (80 mg total) by mouth once daily., Starting Mon 9/2/2019, Until Tue 9/1/2020, Normal      gabapentin (NEURONTIN) 600 MG tablet Take 2 tablets (1,200 mg total) by mouth 2 (two) times daily., Starting Tue 12/10/2019, Normal      insulin aspart U-100 (NOVOLOG) 100 unit/mL (3 mL) InPn pen Inject 10 units w/ meals plus scale 150-200+2, 201-250+4, 251-300+6, 301-350+8, >350+10. Snack dose 5 units.  Light meal cut back to 5 units. Max daily 60 units., No Print      liraglutide 0.6 mg/0.1 mL, 18 mg/3 mL, subq PNIJ (VICTOZA 2-JOYCE) 0.6 mg/0.1 mL (18 mg/3 mL) PnIj Inject 0.6 mg into the skin daily week 1 and 1.2 mg daily week 2 and on., Normal      losartan (COZAAR) 25 MG tablet Take 0.5 tablets (12.5 mg total) by mouth once daily. Holds if SBP is less than 120., Starting Mon 12/16/2019, Until Tue 12/15/2020, No  "Print      metFORMIN (GLUCOPHAGE) 500 MG tablet Take 2 tablets by mouth in the morning, and take 2 tablets by mouth at night., Normal      ONETOUCH DELICA LANCETS 33 gauge Misc Starting Thu 5/4/2017, Historical Med      ONETOUCH ULTRA2 kit Historical Med      pen needle, diabetic 31 gauge x 5/16" Ndle Use to inject insulin four times daily, Starting Fri 8/2/2019, Normal      pen needle, diabetic 32 gauge x 5/32" Ndle Use in inject insulin nightly, Starting Mon 3/30/2020, Normal         STOP taking these medications       doxycycline (MONODOX) 100 MG capsule Comments:   Reason for Stopping:                 Discharge Diet:diabetic diet: 2000 calorie with Normal Fluid intake of 1500 - 2000 mL per day    Activity: activity as tolerated    Discharge Condition: Good    Disposition: Home-Health Care [facility]    Follow up:    Follow-up Information     Lorena Thakur MD In 1 week.    Specialty:  Internal Medicine  Why:  Primary care followup  Contact information:  [address]  [place] [address]  [phone]             David Foy MD In 2 weeks.    Specialty:  Infectious Diseases  Why:  Infectious disease followup   Contact information:  [address] [address]  [phone]             [facility] ENDOCRINOLOGY In 2 weeks.    Specialty:  Endocrinology  Why:  diabetes management  Contact information:  [address] [address]  [phone]           [facility] PODIATRY In 2 weeks.    Specialty:  Podiatry  Why:  Podaitry followup for left foot wound  Contact information:  [facility] [place] [address]  [phone]                 Tests pending at the time of discharge: none        Time spent  on the discharge of the patient including review of hospital course with the patient. reviewing discharge medications and arranging follow-up care: > 45 mins    Discharge examination: Patient was seen and examined on the date of discharge and determined to be suitable for " discharge.      Gerardo Pelaez MD  Hospital Medicine Staff  Pager: [phone]; Spectra: [phone]

## 2020-03-31 NOTE — PT/OT/SLP DISCHARGE
Occupational Therapy Discharge Summary    Indio Ryder Jr.  MRN: [id number]   Principal Problem: COVID-19 virus infection      Patient Discharged from acute Occupational Therapy on 3/31/20.      Assessment:      Patient was discharged unexpectedly.  Information required to complete an accurate discharge summary is unknown.  Refer to therapy initial evaluation and last progress note for initial and most recent functional status and goal achievement.  Recommendations made may be found in medical record.    Objective:     GOALS:   Multidisciplinary Problems     Occupational Therapy Goals        Problem: Occupational Therapy Goal    Goal Priority Disciplines Outcome Interventions   Occupational Therapy Goal     OT, PT/OT Ongoing, Progressing    Description:  Goals to be met by: 4/7/20    Patient will increase functional independence with ADLs by performing:    UE Dressing with set-up A.  LE Dressing with Moderate assistance.  Grooming while standing with Minimal Assistance.  Toileting from bedside commode with Minimal A for hygiene and clothing management.   Transfer to bedside commode with Minimal A                       Reasons for Discontinuation of Therapy Services  Transfer to alternate level of care.      Plan:     Patient Discharged to: Home with Home Health Service    NATALIE Baltazar  3/31/2020

## 2020-04-01 ENCOUNTER — TELEPHONE (OUTPATIENT)
Dept: INTERNAL MEDICINE | Facility: CLINIC | Age: 50
End: 2020-04-01

## 2020-04-01 ENCOUNTER — PATIENT OUTREACH (OUTPATIENT)
Dept: ADMINISTRATIVE | Facility: CLINIC | Age: 50
End: 2020-04-01

## 2020-04-01 ENCOUNTER — TELEPHONE (OUTPATIENT)
Dept: WOUND CARE | Facility: CLINIC | Age: 50
End: 2020-04-01

## 2020-04-01 NOTE — TELEPHONE ENCOUNTER
Returned call to patient regarding appointment with podiatry-  no answer and mailbox full.  Patient has scheduled appointment for 4/14/2020 at 10am with Dr. Almonte in podiatry.  Will attempt to contact patient and give information requested.

## 2020-04-01 NOTE — PHYSICIAN QUERY
PT Name: Indio Ryder Jr.  MR #: [id number]    Physician Query Form - Cause and Effect Relationship Clarification      CDS/: Arcenio Harris RN               Contact information:   [email or web address]    This form is a permanent document in the medical record.     Query Date: April 1, 2020    By submitting this query, we are merely seeking further clarification of documentation. Please utilize your independent clinical judgment when addressing the question(s) below.    The Medical record contains the following:  Supporting Clinical Findings   Location in record                       Blood Culture, Routine:  Abnormal  COAGULASE-NEGATIVE STAPHYLOCOCCUS SPECIES :   Organism is a probable contaminant : Specimen Type:  Blood                                                                                                                                         Osteomyelitis of left foot  - Podiatry and ID following  - s/p IV zosyn  - On cefazolin per ID (MSSA), pharm D for dosing (end date- 4/22/20)                      3/15 Other viral testing- lab    3/21 micro      3/30  Discharge summary          Sepsis with acute organ dysfunction  Complicated, osteomyelitis with suspected Staph PNA and concern for COVID.  - Continue Vanc and zosyn.  IVFs with caution                                                                                                                                                                                            3/15 Progress note- Hospital medicine, Dr. Valladares          Provider, please clarify if there is any correlation between:    Sepsis and:            (Please purvi all associated conditions: )      [ X ] MSSA   Due to or associated with each other   [  ] COVID- 19   Due to or associated with each other   [  ] Unrelated to each other   [  ] Other (Please Specify): _________________________   [  ] Clinically Undetermined

## 2020-04-01 NOTE — TELEPHONE ENCOUNTER
----- Message from Belkys Harris sent at 4/1/2020  4:00 PM CDT -----  Contact: Pt  Type:  Patient Returning Call     Who Called: Indio Ryder Jr.   Who Left Message for Patient: Nurse   Does the patient know what this is regarding?:WoundCare   Would the patient rather a call back or a response via [facility]? CallBack   Best Call Back Number: [phone] (home)   Additional Information: Pt states he has a ulcer on his foot and he's waiting on a appointment for podiatry

## 2020-04-01 NOTE — PATIENT INSTRUCTIONS
Instructions for Patients Awaiting COVID-19 Test Results    You will either be called with your test result or it will be released to the patient portal.  If you have any questions about your test, please visit [email or web address] or call our COVID-19 information line at [phone].    Prevention steps for patients with confirmed or suspected COVID-19       Stay home and stay away from family members and friends. The CDC says, you can leave home after these three things have happened: 1) You have had no fever for at least 72 hours (that is three full days of no fever without the use of medicine that reduces fevers) 2) AND other symptoms have improved (for example, when your cough or shortness of breath have improved) 3) AND at least 7 days have passed since your symptoms first appeared.   Separate yourself from other people and animals in your home.   Call ahead before visiting your doctor.   Wear a facemask.   Cover your coughs and sneezes.   Wash your hands often with soap and water; hand  can be used, too.   Avoid sharing personal household items.   Wipe down surfaces used daily.   Monitor your symptoms. Seek prompt medical attention if your illness is worsening (e.g., difficulty breathing).    Before seeking care, call your healthcare provider.   If you have a medical emergency and need to call 911, notify the dispatch personnel that you have, or are being evaluated for COVID-19. If possible, put on a facemask before emergency medical services arrive.        Recommended precautions for household members, intimate partners, and caregivers in a home setting of a patient with symptomatic laboratory-confirmed COVID-19 or a patient under investigation.  Household members, intimate partners, and caregivers in the home setting awaiting tests results have close contact with a person with symptomatic, laboratory-confirmed COVID-19 or a person under investigation. Close contacts should  monitor their health; they should call their provider right away if they develop symptoms suggestive of COVID-19 (e.g., fever, cough, shortness of breath).    Close contacts should also follow these recommendations:   Make sure that you understand and can help the patient follow their provider's instructions for medication(s) and care. You should help the patient with basic needs in the home and provide support for getting groceries, prescriptions, and other personal needs.   Monitor the patient's symptoms. If the patient is getting sicker, call his or her healthcare provider and tell them that the patient has laboratory-confirmed COVID-19. If the patient has a medical emergency and you need to call 911, notify the dispatch personnel that the patient has, or is being evaluated for COVID-19.   Household members should stay in another room or be  from the patient. Household members should use a separate bedroom and bathroom, if available.   Prohibit visitors.   Household members should care for any pets in the home.   Make sure that shared spaces in the home have good air flow, such as by an air conditioner or an opened window, weather permitting.   Perform hand hygiene frequently. Wash your hands often with soap and water for at least 20 seconds or use an alcohol-based hand  (that contains > 60% alcohol) covering all surfaces of your hands and rubbing them together until they feel dry. Soap and water should be used preferentially.   Avoid touching your eyes, nose, and mouth.   The patient should wear a facemask. If the patient is not able to wear a facemask (for example, because it causes trouble breathing), caregivers should wear a mask when they are in the same room as the patient.   Wear a disposable facemask and gloves when you touch or have contact with the patient's blood, stool, or body fluids, such as saliva, sputum, nasal mucus, vomit, urine.  o Throw out disposable facemasks and  gloves after using them. Do not reuse.  o When removing personal protective equipment, first remove and dispose of gloves. Then, immediately clean your hands with soap and water or alcohol-based hand . Next, remove and dispose of facemask, and immediately clean your hands again with soap and water or alcohol-based hand .   You should not share dishes, drinking glasses, cups, eating utensils, towels, bedding, or other items with the patient. After the patient uses these items, you should wash them thoroughly (see below Wash laundry thoroughly).   Clean all high-touch surfaces, such as counters, tabletops, doorknobs, bathroom fixtures, toilets, phones, keyboards, tablets, and bedside tables, every day. Also, clean any surfaces that may have blood, stool, or body fluids on them.   Use a household cleaning spray or wipe, according to the label instructions. Labels contain instructions for safe and effective use of the cleaning product including precautions you should take when applying the product, such as wearing gloves and making sure you have good ventilation during use of the product.   Wash laundry thoroughly.  o Immediately remove and wash clothes or bedding that have blood, stool, or body fluids on them.  o Wear disposable gloves while handling soiled items and keep soiled items away from your body. Clean your hands (with soap and water or an alcohol-based hand ) immediately after removing your gloves.  o Read and follow directions on labels of laundry or clothing items and detergent. In general, using a normal laundry detergent according to washing machine instructions and dry thoroughly using the warmest temperatures recommended on the clothing label.   Place all used disposable gloves, facemasks, and other contaminated items in a lined container before disposing of them with other household waste. Clean your hands (with soap and water or an alcohol-based hand )  immediately after handling these items. Soap and water should be used preferentially if hands are visibly dirty.   Discuss any additional questions with your state or local health department or healthcare provider. Check available hours when contacting your local health department.    For more information see CDC link below.      https://www.cdc.gov/coronavirus/2019-ncov/hcp/guidance-prevent-spread.html#precautions        Sources:  [facility] [place], [place] [facility] and [facility]

## 2020-04-02 ENCOUNTER — TELEPHONE (OUTPATIENT)
Dept: PODIATRY | Facility: CLINIC | Age: 50
End: 2020-04-02

## 2020-04-02 ENCOUNTER — TELEPHONE (OUTPATIENT)
Dept: INTERNAL MEDICINE | Facility: CLINIC | Age: 50
End: 2020-04-02

## 2020-04-02 DIAGNOSIS — L97.524 FOOT ULCER, LEFT, WITH NECROSIS OF BONE: Primary | ICD-10-CM

## 2020-04-02 NOTE — TELEPHONE ENCOUNTER
Extended conversation with HH agency in [place] area.  Financial info, Covid results, HH orders and discharge summary faxed per request to initiate HH.

## 2020-04-02 NOTE — TELEPHONE ENCOUNTER
"Extended conversation with sister.  Patient is staying with her in [place] post discharge.  They currently do not have home health and dressing has not been changed.  Sister states "I can't do that".  Sister to contact Medicaid office and determine availablity of HH in their area.  My direct contact info provided to call me back to see determine next steps.  "

## 2020-04-02 NOTE — TELEPHONE ENCOUNTER
Original Message:  Pt states he has been seeing Dr Burrell(Pod ) in [place]//he is staying with his sister in [place], [place] right now and is wanting to know if possible to be seen by a Pod  here in [place], [place]//please call to discuss//thanks//ll      Called to discuss care with patient, however there was no answer. Ideally, with patient being recently discharged from the hospital after testing positive for COVID-19, it would be best to keep his current care within the same location. If this is in regards to his foot ulceration, he could do home dressing changes or have a family member help. If this is not a possibility and he is unable to see his regular provider, we could attempt home health for this. Will attempt to call patient back.

## 2020-04-02 NOTE — TELEPHONE ENCOUNTER
Called patient to discuss scheduling a podiatry appointment. He did not answer. Could not leave a message due to voice mail box being full.

## 2020-04-05 ENCOUNTER — TELEPHONE (OUTPATIENT)
Dept: INTERNAL MEDICINE | Facility: CLINIC | Age: 50
End: 2020-04-05

## 2020-04-05 ENCOUNTER — NURSE TRIAGE (OUTPATIENT)
Dept: ADMINISTRATIVE | Facility: CLINIC | Age: 50
End: 2020-04-05

## 2020-04-05 ENCOUNTER — OFFICE VISIT (OUTPATIENT)
Dept: INTERNAL MEDICINE | Facility: CLINIC | Age: 50
End: 2020-04-05
Payer: MEDICAID

## 2020-04-05 DIAGNOSIS — T36.95XA ALLERGIC REACTION DUE TO ANTIBACTERIAL DRUG: ICD-10-CM

## 2020-04-05 DIAGNOSIS — I10 ESSENTIAL HYPERTENSION: ICD-10-CM

## 2020-04-05 PROCEDURE — 99213 OFFICE O/P EST LOW 20 MIN: CPT | Mod: 95,,, | Performed by: INTERNAL MEDICINE

## 2020-04-05 PROCEDURE — 99213 PR OFFICE/OUTPT VISIT, EST, LEVL III, 20-29 MIN: ICD-10-PCS | Mod: 95,,, | Performed by: INTERNAL MEDICINE

## 2020-04-05 NOTE — Clinical Note
Urgent visit for rash and on ancef.  He appears to be having an allergic reaction.  No Red flags but was instructed to hold after AM dose.  [facility] also called with this.  He will likely need his abx chged.  Please call me with any questions concerning this case.  Missael Yarbrough  [phone].  thanks

## 2020-04-05 NOTE — TELEPHONE ENCOUNTER
Covid + pt c/o itchy rash to gabe legs, gabe arms, back, and buttocks. States rash is bumpy, no blisters. Denies swelling to face, lips, tongue, throat. Pt states admitted to [facility] 3/10/20 for left foot ulcer, while there tested + for covid, fever, respiratory depression, ventilator, increased BP, started new meds for HTN, discharged 3/29/20. Pt states while in-pt c/o rash and itching, given benadryl. Pt states at home itching and rash have continued, 4/1/20 took 50mg on own at home with no relief and c/o excessive sleepiness with benadryl use. Pt stopped amlodipine 10mg QD on his own 2 days ago, stopped metoprolol 50mg BID last PM on his own. Pt instructed to take 50mg benadryl now, pt refuses dosage, states will take 25mg now. Pt states bp now 126/82. Will continue to hold his HTN meds. Pt transferred to [facility] for primary care appt. Pt denies covid s/s.     Reason for Disposition   Hives or itching    Additional Information   Negative: Difficulty breathing or wheezing   Negative: Hoarseness or cough that started soon after 1st dose of drug   Negative: Swollen tongue that started soon after first dose of drug   Negative: Fever and purple or blood-colored spots or dots   Negative: Too weak or sick to stand   Negative: Sounds like a life-threatening emergency to the triager   Negative: Rash is only on 1 part of the body (localized)   Negative: Taking new non-prescription (OTC) antihistamine, decongestant, ear drops, eye drops, or other OTC cough/cold medicine   Negative: Taking new prescription antihistamine, allergy medicine, asthma medicine, eye drops, ear drops or nose drops   Negative: Rash started more than 3 days after stopping new prescription medicine   Negative: Swollen tongue   Negative: Widespread hives and onset < 2 hours of exposure to 1st dose of drug   Negative: Patient sounds very sick or weak to the triager   Negative: Fever   Negative: Face or lip swelling    Negative: Purple or blood-colored spots or dots (no fever and sounds well to triager)   Negative: Joint pain or swelling   Negative: Bloody crusts on lips or in mouth   Negative: Large or small blisters on skin (i.e., fluid filled bubbles or sacs)   Negative: Pregnant   Negative: Rash beginning within 4 hours of a new prescription medication     Pt unsure, in-pt when rash and itch started, 19 day in-pt, 2 new HTN meds started while in hospital.    Protocols used: RASH - WIDESPREAD ON DRUGS - DRUG [id number]

## 2020-04-05 NOTE — PROGRESS NOTES
Primary Care Provider Appointment    Subjective:      Patient ID: Indio Ryder Jr. is a 49 y.o. male  Via video visit for an acute issue.      Chief Complaint: rash  The patient location is: pt home--[place]  The chief complaint leading to consultation is: rash  Visit type: Virtual visit with synchronous audio and video  Total time spent with patient: 20m  Each patient to whom he or she provides medical services by telemedicine is:  (1) informed of the relationship between the physician and patient and the respective role of any other health care provider with respect to management of the patient; and (2) notified that he or she may decline to receive medical services by telemedicine and may withdraw from such care at any time.    HPI:  This is a 48 yo M recently d/c from the hospital for DKA with COVID and a a diabetic foot ulcer.  He states that he was started on 2 new BP meds while in the hospital.  He held amlodipine on Friday and the itching rash located over his arms and trunk and buttocks has not chged.  Benadryl causes significant sedation but he has been taking.  He notes that the rash is fine and began even before being d/c from the hospital.  He has continued the metoprolol and his reported home BP is 127/80. He was also d./c on IV ancef for his DM foot ulcer. He states that he has several more weeks of abx to go.  He denies any oral/genital/nasal lesions.  No blisters or broken skin.  No peeling skin.  no Gi or pulmonary symptoms.     We discussed that I believe this is a reaction to his current abx.  His ID MD is Dr Alicia Doe.  The infusion company is [employer] [phone] .      Patient Active Problem List   Diagnosis    Essential hypertension    Mixed hyperlipidemia    Traumatic amputation of fifth toe of right foot    Type 2 diabetes mellitus with hyperglycemia, with long-term current use of insulin    Hypokalemia    Uncontrolled type 2 diabetes mellitus    Neck pain     Leukocytosis    Foot ulcer    Limb pain    Left foot infection    Severe malnutrition    Hypertensive retinopathy, bilateral    Osteomyelitis of left foot    COVID-19 virus infection    COVID-19 virus detected    Allergic reaction due to antibacterial drug        Past Surgical History:   Procedure Laterality Date    DEBRIDEMENT OF FOOT Left 7/14/2019    Procedure: DEBRIDEMENT, FOOT, with left 5th ray amputation;  Surgeon: Sis Hickman DPM;  Location: [facility] OR [address] [place] [address];  Service: Podiatry;  Laterality: Left;    DEBRIDEMENT OF FOOT Left 7/17/2019    Procedure: DEBRIDEMENT, FOOT with leftt 5th ray partial amputation with Neox Graft;  Surgeon: Mai Burrell DPM;  Location: [facility] OR [address] [place] [address];  Service: Podiatry;  Laterality: Left;  t    TOE AMPUTATION Right 06/05/2015    TOE AMPUTATION Right 01/19/2017       Past Medical History:   Diagnosis Date    Actinomyces infection 1/17/2017    Right foot    Diabetic ketoacidosis without coma associated with type 2 diabetes mellitus 5/30/2017    Diabetic ulcer of right foot associated with type 2 diabetes mellitus 6/3/2015    Essential hypertension 6/6/2013    Group B streptococcal infection 1/13/2017    Mixed hyperlipidemia 8/12/2014    Shoulder impingement 8/12/2014    Subacute osteomyelitis of right foot 1/12/2017    Streptococcus agalactiae, Actinomyces odontolyticus    Traumatic amputation of fifth toe of right foot 07/02/2015    Type 2 diabetes mellitus with diabetic neuropathy, with long-term current use of insulin 5/3/2016    Ulcerative colitis, unspecified        Social History     Socioeconomic History    Marital status: Single     Spouse name: Not on file    Number of children: 0    Years of education: Not on file    Highest education level: Not on file   Occupational History     Employer: [employer]   Social Needs    Financial resource strain: Not on file    Food insecurity:     Worry: Not on file     Inability: Not on file     Transportation needs:     Medical: Not on file     Non-medical: Not on file   Tobacco Use    Smoking status: Never Smoker    Smokeless tobacco: Never Used   Substance and Sexual Activity    Alcohol use: No    Drug use: No    Sexual activity: Yes     Partners: Female     Birth control/protection: Condom   Lifestyle    Physical activity:     Days per week: Not on file     Minutes per session: Not on file    Stress: Not on file   Relationships    Social connections:     Talks on phone: Not on file     Gets together: Not on file     Attends Jew service: Not on file     Active member of club or organization: Not on file     Attends meetings of clubs or organizations: Not on file     Relationship status: Not on file   Other Topics Concern    Not on file   Social History Narrative    Not on file       Review of Systems   Constitutional: Negative for chills and fever.   Skin: Positive for rash.       No flowsheet data found.     Checklist of Daily Activities:        Objective:   There were no vitals taken for this visit.    Physical Exam   Constitutional: No distress.   Neurological: He is alert.   Skin:   Difficult to fully eval since a video visit but rash on arm noted to be fine with slight erythema noted.  No blisters/peeling.             Lab Results   Component Value Date    WBC 6.07 03/30/2020    HGB 10.9 (L) 03/30/2020    HCT 36.2 (L) 03/30/2020     03/30/2020    CHOL 190 12/03/2019    TRIG 167 (H) 03/30/2020    HDL 56 12/03/2019    ALT <5 (L) 03/30/2020    AST 22 03/30/2020     03/30/2020    K 3.6 03/30/2020     03/30/2020    CREATININE 1.2 03/30/2020    BUN 14 03/30/2020    CO2 29 03/30/2020    TSH 1.136 03/01/2012    PSA 0.65 08/11/2014    INR 1.1 01/16/2017    HGBA1C 12.0 (H) 03/12/2020       Current Outpatient Medications on File Prior to Visit   Medication Sig Dispense Refill    atorvastatin (LIPITOR) 80 MG tablet Take 1 tablet (80 mg total) by mouth once daily. 90 tablet  "3    ceFAZolin (ANCEF) 1 gram injection Inject 2,000 mg (2 g total) into the vein every 8 (eight) hours. for 23 days      gabapentin (NEURONTIN) 600 MG tablet Take 2 tablets (1,200 mg total) by mouth 2 (two) times daily. 120 tablet 3    insulin aspart U-100 (NOVOLOG) 100 unit/mL (3 mL) InPn pen Inject 10 units w/ meals plus scale 150-200+2, 201-250+4, 251-300+6, 301-350+8, >350+10. Snack dose 5 units.  Light meal cut back to 5 units. Max daily 60 units. 2 Box 6    insulin detemir U-100 (LEVEMIR FLEXTOUCH) 100 unit/mL (3 mL) SubQ InPn pen Inject 30 Units into the skin every evening. 30 mL 3    liraglutide 0.6 mg/0.1 mL, 18 mg/3 mL, subq PNIJ (VICTOZA 2-JOYCE) 0.6 mg/0.1 mL (18 mg/3 mL) PnIj Inject 0.6 mg into the skin daily week 1 and 1.2 mg daily week 2 and on. 6 mL 6    losartan (COZAAR) 25 MG tablet Take 0.5 tablets (12.5 mg total) by mouth once daily. Holds if SBP is less than 120. 45 tablet 3    metFORMIN (GLUCOPHAGE) 500 MG tablet Take 2 tablets by mouth in the morning, and take 2 tablets by mouth at night. 360 tablet 3    metoprolol tartrate (LOPRESSOR) 50 MG tablet Take 1 tablet (50 mg total) by mouth 2 (two) times daily. 60 tablet 11    ONETOUCH DELICA LANCETS 33 gauge Misc       ONETOUCH ULTRA2 kit       pen needle, diabetic 31 gauge x 5/16" Ndle Use to inject insulin four times daily 100 each 3    pen needle, diabetic 32 gauge x 5/32" Ndle Use in inject insulin nightly 100 each 0    [DISCONTINUED] amLODIPine (NORVASC) 10 MG tablet Take 1 tablet (10 mg total) by mouth once daily. 30 tablet 11     No current facility-administered medications on file prior to visit.          Assessment:   49 y.o. male with multiple co-morbid illnesses here for continued follow up of medical problems.      Plan:     Problem List Items Addressed This Visit        Cardiac/Vascular    Essential hypertension     With reported home BP today and off amlodipine for 2 days, will not restart amlodipine.  Pt to f/u with PCP " "concerning further adjustments of Meds.             Other    Allergic reaction due to antibacterial drug     This appears to be an allergic reaction to ancef--minor.  No indication that this reaction is life threatening without any red flags  · Pt will take his dose that is due now.  No further doses until contacted by ID.  A message will be sent to ID concerning this reaction and also to pt's PCP.  Pt to call in the AM if he has not heard back from them by 9AM.  Ela with [facility] was also called and the reaction was discussed.  She will also contact ID concerning this.    · Red flags discussed at length and he is to call with any issues/concerns  · Pt to take benadryl every 8 hours as tolerated and cool baths with oatmeal baths.                 Health Maintenance       Date Due Completion Date    Shingles Vaccine (1 of 2) 07/12/2018 ---    Influenza Vaccine (1) 09/01/2019 ---    Hemoglobin A1c 06/12/2020 3/12/2020    Colonoscopy 12/21/2020 12/21/2010    Eye Exam 01/28/2021 1/28/2020    Foot Exam 03/10/2021 3/10/2020    Lipid Panel 03/30/2021 3/30/2020    Low Dose Statin 04/01/2021 4/1/2020    TETANUS VACCINE 06/09/2027 6/9/2017        Medications Reconciliation:   I have not reconciled the patient's home medications with the patient/family. I have updated all changes.  Refer to After-Visit Medication List.      Medication List           Accurate as of April 5, 2020 12:25 PM. If you have any questions, ask your nurse or doctor.               CONTINUE taking these medications    atorvastatin 80 MG tablet  Commonly known as:  LIPITOR  Take 1 tablet (80 mg total) by mouth once daily.     * BD ULTRA-FINE SHORT PEN NEEDLE 31 gauge x 5/16" Ndle  Generic drug:  pen needle, diabetic  Use to inject insulin four times daily     * BD ULTRA-FINE SASCHA PEN NEEDLE 32 gauge x 5/32" Ndle  Generic drug:  pen needle, diabetic  Use in inject insulin nightly     ceFAZolin 1 gram injection  Commonly known as:  ANCEF  Inject " 2,000 mg (2 g total) into the vein every 8 (eight) hours. for 23 days     gabapentin 600 MG tablet  Commonly known as:  NEURONTIN  Take 2 tablets (1,200 mg total) by mouth 2 (two) times daily.     insulin aspart U-100 100 unit/mL (3 mL) Inpn pen  Commonly known as:  NovoLOG  Inject 10 units w/ meals plus scale 150-200+2, 201-250+4, 251-300+6, 301-350+8, >350+10. Snack dose 5 units.  Light meal cut back to 5 units. Max daily 60 units.     LEVEMIR FLEXTOUCH U-100 INSULN 100 unit/mL (3 mL) Inpn pen  Generic drug:  insulin detemir U-100  Inject 30 Units into the skin every evening.     losartan 25 MG tablet  Commonly known as:  COZAAR  Take 0.5 tablets (12.5 mg total) by mouth once daily. Holds if SBP is less than 120.     metFORMIN 500 MG tablet  Commonly known as:  GLUCOPHAGE  Take 2 tablets by mouth in the morning, and take 2 tablets by mouth at night.     metoprolol tartrate 50 MG tablet  Commonly known as:  LOPRESSOR  Take 1 tablet (50 mg total) by mouth 2 (two) times daily.     ONETOUCH DELICA LANCETS 33 gauge Misc  Generic drug:  lancets     ONETOUCH ULTRA2 METER kit  Generic drug:  blood-glucose meter     VICTOZA 2-JOYCE 0.6 mg/0.1 mL (18 mg/3 mL) Pnij  Generic drug:  liraglutide 0.6 mg/0.1 mL (18 mg/3 mL) subq PNIJ  Inject 0.6 mg into the skin daily week 1 and 1.2 mg daily week 2 and on.         * This list has 2 medication(s) that are the same as other medications prescribed for you. Read the directions carefully, and ask your doctor or other care provider to review them with you.            STOP taking these medications    amLODIPine 10 MG tablet  Commonly known as:  NORVASC  Stopped by:  MD Missael Rivero MD  Internal Medicine  [facility]  [phone]

## 2020-04-05 NOTE — ASSESSMENT & PLAN NOTE
This appears to be an allergic reaction to ancef--minor.  No indication that this reaction is life threatening without any red flags  · Pt will take his dose that is due now.  No further doses until contacted by ID.  A message will be sent to ID concerning this reaction and also to pt's PCP.  Pt to call in the AM if he has not heard back from them by 9AM.  Ela with [facility] was also called and the reaction was discussed.  She will also contact ID concerning this.    · Red flags discussed at length and he is to call with any issues/concerns  · Pt to take benadryl every 8 hours as tolerated and cool baths with oatmeal baths.

## 2020-04-05 NOTE — ASSESSMENT & PLAN NOTE
With reported home BP today and off amlodipine for 2 days, will not restart amlodipine.  Pt to f/u with PCP concerning further adjustments of Meds.

## 2020-04-06 ENCOUNTER — TELEPHONE (OUTPATIENT)
Dept: PODIATRY | Facility: CLINIC | Age: 50
End: 2020-04-06

## 2020-04-06 ENCOUNTER — TELEPHONE (OUTPATIENT)
Dept: INTERNAL MEDICINE | Facility: CLINIC | Age: 50
End: 2020-04-06

## 2020-04-06 ENCOUNTER — TELEPHONE (OUTPATIENT)
Dept: INFECTIOUS DISEASES | Facility: CLINIC | Age: 50
End: 2020-04-06

## 2020-04-06 NOTE — TELEPHONE ENCOUNTER
----- Message from Margie Velasco MA sent at 4/6/2020  9:04 AM CDT -----  Contact: rigo/wife [phone]  The infusion company says the internist at [facility] (she didn't know the name) told him to hold the cefazolin because he was having an allergic reaction   ----- Message -----  From: Shirlene Birmingham MA  Sent: 4/6/2020   8:50 AM CDT  To: WILNER Connollyu's pt  ----- Message -----  From: Tierra Emery  Sent: 4/6/2020   8:09 AM CDT  To: [facility] [place] Staff    Pt states he seen dr in the hospital Saturday or Sunday states he is having an allergic reaction to the medication that is going into the picc-line please call back to discuss

## 2020-04-06 NOTE — TELEPHONE ENCOUNTER
Spoke with  at [facility], they requested Demographic sheet, discharge summary, H&P, labs and current HH orders for their review prior to accepting patient for  care.  All requested information routed/faxed to provided number.

## 2020-04-06 NOTE — TELEPHONE ENCOUNTER
Spoke with Reji at [facility] and gave him the provider orders to stop cefazolin and start the pt on vancomycin. Start date.4/9/2020, stop date 4/27/2020

## 2020-04-06 NOTE — TELEPHONE ENCOUNTER
----- Message from Jazmin Gayle sent at 4/6/2020  7:59 AM CDT -----  Contact: Wife Maite [phone] or Pt [phone]  She says the pt's Pic line is giving him a bad rash and she would like to speak to someone about this. Please call with to discuss.

## 2020-04-06 NOTE — TELEPHONE ENCOUNTER
Spoke to patient.  He reports itching all over.      Assessment  1.  Drug reaction possibly to IV cefazolin.    Plan  1.  Discontinue IV cefazolin.  2.  Obtain all his outpatient labs.  Have them fax it to me.  3.  Plan to start IV vancomycin 1.5 grams q 12 hours on 4/9/20.  He has had vancomycin before.  4.  Check cbc, cmp, crp, vanc trough once a week  5.  Stop date would be April 27

## 2020-04-06 NOTE — TELEPHONE ENCOUNTER
Spoke with sister, 2 of the  agencies given to her by Medicaid office, do not accept his insurance.  She will contact the office and ask for specific company that is on his plan and let us know so we can contact them with orders for wound care.

## 2020-04-06 NOTE — TELEPHONE ENCOUNTER
Called pt and wife back. They said pt has a rash that they think is from iv antibiotics. They spoke with infectious disease and was recommended to stop current antibiotic and begin a different one. See encounter

## 2020-04-08 ENCOUNTER — TELEPHONE (OUTPATIENT)
Dept: PODIATRY | Facility: CLINIC | Age: 50
End: 2020-04-08

## 2020-04-08 ENCOUNTER — OFFICE VISIT (OUTPATIENT)
Dept: INTERNAL MEDICINE | Facility: CLINIC | Age: 50
End: 2020-04-08
Payer: MEDICAID

## 2020-04-08 DIAGNOSIS — Z79.4 TYPE 2 DIABETES MELLITUS WITH HYPERGLYCEMIA, WITH LONG-TERM CURRENT USE OF INSULIN: Primary | ICD-10-CM

## 2020-04-08 DIAGNOSIS — M86.279 SUBACUTE OSTEOMYELITIS OF FOOT, UNSPECIFIED LATERALITY: ICD-10-CM

## 2020-04-08 DIAGNOSIS — R53.81 DEBILITY: ICD-10-CM

## 2020-04-08 DIAGNOSIS — I95.1 ORTHOSTATIC HYPOTENSION: ICD-10-CM

## 2020-04-08 DIAGNOSIS — T50.905A ADVERSE EFFECT OF DRUG, INITIAL ENCOUNTER: ICD-10-CM

## 2020-04-08 DIAGNOSIS — I10 ESSENTIAL HYPERTENSION: ICD-10-CM

## 2020-04-08 DIAGNOSIS — Z79.4 TYPE 2 DIABETES MELLITUS WITH HYPERGLYCEMIA, WITH LONG-TERM CURRENT USE OF INSULIN: ICD-10-CM

## 2020-04-08 DIAGNOSIS — E11.65 TYPE 2 DIABETES MELLITUS WITH HYPERGLYCEMIA, WITH LONG-TERM CURRENT USE OF INSULIN: ICD-10-CM

## 2020-04-08 DIAGNOSIS — U07.1 COVID-19 VIRUS INFECTION: Primary | ICD-10-CM

## 2020-04-08 DIAGNOSIS — E11.59 HYPERTENSION ASSOCIATED WITH DIABETES: ICD-10-CM

## 2020-04-08 DIAGNOSIS — E11.65 TYPE 2 DIABETES MELLITUS WITH HYPERGLYCEMIA, WITH LONG-TERM CURRENT USE OF INSULIN: Primary | ICD-10-CM

## 2020-04-08 DIAGNOSIS — E78.2 MIXED HYPERLIPIDEMIA: ICD-10-CM

## 2020-04-08 DIAGNOSIS — U07.1 COVID-19 VIRUS INFECTION: ICD-10-CM

## 2020-04-08 DIAGNOSIS — I15.2 HYPERTENSION ASSOCIATED WITH DIABETES: ICD-10-CM

## 2020-04-08 DIAGNOSIS — M86.272 SUBACUTE OSTEOMYELITIS OF LEFT FOOT: ICD-10-CM

## 2020-04-08 PROCEDURE — 99214 OFFICE O/P EST MOD 30 MIN: CPT | Mod: 95,,, | Performed by: NURSE PRACTITIONER

## 2020-04-08 PROCEDURE — 99214 OFFICE O/P EST MOD 30 MIN: CPT | Mod: 95,,, | Performed by: INTERNAL MEDICINE

## 2020-04-08 PROCEDURE — 99214 PR OFFICE/OUTPT VISIT, EST, LEVL IV, 30-39 MIN: ICD-10-PCS | Mod: 95,,, | Performed by: INTERNAL MEDICINE

## 2020-04-08 PROCEDURE — 99214 PR OFFICE/OUTPT VISIT, EST, LEVL IV, 30-39 MIN: ICD-10-PCS | Mod: 95,,, | Performed by: NURSE PRACTITIONER

## 2020-04-08 NOTE — PATIENT INSTRUCTIONS
Snacks can be an important part of a balanced, healthy meal plan. They allow you to eat more frequently, feeling full and satisfied throughout the day. Also, they allow you to spread carbohydrates evenly, which may stabilize blood sugars.  Plus, snacks are enjoyable!     The amount of carbohydrate needed at snacks varies. Generally, about 15-30 grams of carbohydrate per snack is recommended.  Below you will find some tasty treats.       0-5 gm carb   Crystal Light   Vitamin Water Zero   Herbal tea, unsweetened   2 tsp peanut butter on celery   1./2 cup sugar-free jell-o   1 sugar-free popsicle   ¼ cup blueberries   8oz Blue Aissatou unsweetened almond milk   5 baby carrots & celery sticks, cucumbers, bell peppers dipped in ¼ cup salsa, 2Tbsp light ranch dressing or 2Tbsp plain Greek yogurt   10 Goldfish crackers   ½ oz low-fat cheese or string cheese   1 closed handful of nuts, unsalted   1 Tbsp of sunflower seeds, unsalted   1 cup Smart Pop popcorn   1 whole grain brown rice cake        15 gm carb   1 small piece of fruit or ½ banana or 1/2 cup lite canned fruit   3 carina cracker squares   3 cups Smart Pop popcorn, top spray butter, [place] lite salt or cinnamon and Truvia   5 Vanilla Wafers   ½ cup low fat, no added sugar ice cream or frozen yogurt (Blue bell, Blue Bunny, Weight Watchers, Skinny Cow)   ½ turkey, ham, or chicken sandwich   ½ c fruit with ½ c Cottage cheese   4-6 unsalted wheat crackers with 1 oz low fat cheese or 1 tbsp peanut butter    30-45 goldfish crackers (depending on flavor)    7-8 Yarsani mini brown rice cakes (caramel, apple cinnamon, chocolate)    12 Yarsani mini brown rice cakes (cheddar, bbq, ranch)    1/3 cup hummus dip with raw veg   1/2 whole wheat kai, 1Tbsp hummus   Mini Pizza (1/2 whole wheat English muffin, low-fat  cheese, tomato sauce)   100 calorie snack pack (Oreo, Chips Ahoy, Ritz Mix, Baked Cheetos)   4-6 oz. light or Greek Style yogurt  (Simran, Julian, [facility], [facility])   ½ cup sugar-free pudding     6 in. wheat tortilla or kai oven toasted chips (topped with spray butter flavoring, cinnamon, Truvia OR spray butter, garlic powder, chili powder)    18 BBQ Popchips (available at [employer], [employer] Foods, Fresh Market)

## 2020-04-08 NOTE — TELEPHONE ENCOUNTER
Call from sister re CINDY.  Called back and left message to return our call as we had sent all requested info to [facility] and we had not heard that they would not be able to accept him as a client

## 2020-04-08 NOTE — PROGRESS NOTES
Subjective:       Patient ID: Indio Ryder Jr. is a 49 y.o. male.    Chief Complaint:hospital f/u  HPI The patient location is: home  The chief complaint leading to consultation is: hospital f/u visit  Visit type: Virtual visit with synchronous audio and video  Total time spent with patient:   Each patient to whom he or she provides medical services by telemedicine is:  (1) informed of the relationship between the physician and patient and the respective role of any other health care provider with respect to management of the patient; and (2) notified that he or she may decline to receive medical services by telemedicine and may withdraw from such care at any time.    [id number] minutes.   Notes:   Back in [place].  Hospitalized 3/10-20 with Covid.  Presented with n,v. Intubated.  Course complicated by uncontrolled DM,  L foot osteomyelitis, TAHIR.  Amlodipine and lopressor started, along with Ancef.  He is not taking losartan.  Not getting HH.  BP at home:  120/81 yesterday    (he was hypotensive during [place] so he stopped BP medication)  He has h/o orthostatic hypotension.  He is bed bed most of time.   Due to new foot ulcer (started around [place] Gr[place]), not doing too much      He had not been wearing diabetic shoes at onset.  He would like to see Podiatrist in [place].         Ancef changed to vanc, due to possible drug reaction.  Itching better.        He denies SOB.  Eating well.              Review of Systems   Constitutional: Negative for activity change and unexpected weight change.   Respiratory: Negative for chest tightness and shortness of breath.    Cardiovascular: Negative for chest pain and leg swelling.   Gastrointestinal: Negative for abdominal pain.   Neurological: Negative for headaches.   Psychiatric/Behavioral: Negative for dysphoric mood.       Objective:      Physical Exam   Constitutional: He is oriented to person, place, and time. He appears well-developed and  well-nourished. No distress.   Neurological: He is alert and oriented to person, place, and time.   Psychiatric: He has a normal mood and affect. His behavior is normal.       Assessment:       1. COVID-19 virus infection    2. Type 2 diabetes mellitus with hyperglycemia, with long-term current use of insulin    3. Uncontrolled type 2 diabetes mellitus    4. Hypertension associated with diabetes    5. Adverse effect of drug, initial encounter    6. Orthostatic hypotension        Plan:       Diagnoses and all orders for this visit:    COVID-19 virus infection    Type 2 diabetes mellitus with hyperglycemia, with long-term current use of insulin    Uncontrolled type 2 diabetes mellitus    Hypertension associated with diabetes    Adverse effect of drug, initial encounter    Orthostatic hypotension         Sister is trying to schedule podiatry appt with provider that accepts his medicaid plan.    Monitor sitting and standing BP.    Restart half losartan.  Continue to hold metoprolol and amlodipine for now (he has been holding since drug reaction suspected this weekend).     F/U Gigi Clay.

## 2020-04-08 NOTE — TELEPHONE ENCOUNTER
Call placed to [facility], they are unable to accept Mr Ryder as a patient at this time.  Message left with sister to contact Medicaid and us back concerning this situation.

## 2020-04-08 NOTE — PROGRESS NOTES
CHIEF COMPLAINT: Type 2 Diabetes     HPI: Mr. Indio Ryder Jr. is a 49 y.o. male who was diagnosed with Type 2 DM > 20 years.  Has h/o foot ulcers, amputations.  Has boot (L), podiatry in 2019, 2020.  Hospitalized 7/12/19-8/2/19.   Hospitalized 3/2020.    Seen by me in 12/2019.  Since last visit, a1c has been elevated.   Admitted for osteomyelitis (L) foot, 3/10/20 DKA.  3/15/20-covid19 detected   On vent, extubated on 3/26/20.  Podiatry appt canceled recently.   Has boot to (L) foot, 4th digit-possible amputation- did not have happen via hospitalization, got second opinion.    Reports some nocturnal hypoglycemia, 60s.     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    97, 140s in am  120s-140s  Highest: low 200s  Mild hypoglycemia in am     Lab Results   Component Value Date    HGBA1C 12.0 (H) 03/12/2020     PREVIOUS DIABETES MEDICATIONS TRIED  levemir  novolog  Metformin   trulicity    CURRENT DIABETIC MEDS: levemir  29 units at night, novolog 10 units ac w/ scale (2 times a day), metformin 500 mg bid, victoza 1.2 mg daily    Pt is monitoring blood glucose readings 4 times a day.  Needs >100 strips per month related to fluctuations with blood glucose reading, a1c trends, and activity level.  See above     Diabetes Management Status    Statin: Taking  ACE/ARB: Taking    Screening or Prevention Patient's value Goal Complete/Controlled?   HgA1C Testing and Control   Lab Results   Component Value Date    HGBA1C 12.0 (H) 03/12/2020      Annually/Less than 8% Yes   Lipid profile : 03/30/2020 Annually Yes   LDL control Lab Results   Component Value Date    LDLCALC 120.2 12/03/2019    Annually/Less than 100 mg/dl  No   Nephropathy screening Lab Results   Component Value Date    LABMICR 15.0 08/26/2019     Lab Results   Component Value Date    PROTEINUA Negative 03/18/2020    Annually Yes   Blood pressure BP Readings from Last 1 Encounters:    03/30/20 (!) 158/82    Less than 140/90 Yes   Dilated retinal exam : 01/28/2020 Annually Yes   Foot exam   : 03/10/2020 Annually Yes     REVIEW OF SYSTEMS  General: no weakness, fatigue, +weight fluctuations   Eyes: no double or blurred vision, eye pain, or redness  Cardiovascular: no chest pain, palpitations, edema, or murmurs.   Respiratory: no cough or dyspnea.   GI: no heartburn, nausea, or changes in bowel patterns; good appetite.   Skin: no rashes, dryness, itching, or reactions at insulin injection sites.  Neuro: +numbness, tingling, tremors, or vertigo. +amputation, boot to (L) 5th digit, (R) 5th, great toe   Endocrine: no polyuria, polydipsia, polyphagia, heat or cold intolerance.     Vital Signs  There were no vitals taken for this visit.    Hemoglobin A1C   Date Value Ref Range Status   03/12/2020 12.0 (H) 4.0 - 5.6 % Final     Comment:     ADA Screening Guidelines:  5.7-6.4%  Consistent with prediabetes  >or=6.5%  Consistent with diabetes  High levels of fetal hemoglobin interfere with the HbA1C  assay. Heterozygous hemoglobin variants (HbS, HgC, etc)do  not significantly interfere with this assay.   However, presence of multiple variants may affect accuracy.     12/03/2019 8.4 (H) 4.0 - 5.6 % Final     Comment:     ADA Screening Guidelines:  5.7-6.4%  Consistent with prediabetes  >or=6.5%  Consistent with diabetes  High levels of fetal hemoglobin interfere with the HbA1C  assay. Heterozygous hemoglobin variants (HbS, HgC, etc)do  not significantly interfere with this assay.   However, presence of multiple variants may affect accuracy.     09/12/2019 7.9 (H) 4.0 - 5.6 % Final     Comment:     ADA Screening Guidelines:  5.7-6.4%  Consistent with prediabetes  >or=6.5%  Consistent with diabetes  High levels of fetal hemoglobin interfere with the HbA1C  assay. Heterozygous hemoglobin variants (HbS, HgC, etc)do  not significantly interfere with this assay.   However, presence of multiple variants may affect  accuracy.          Chemistry        Component Value Date/Time     03/30/2020 0405    K 3.6 03/30/2020 0405     03/30/2020 0405    CO2 29 03/30/2020 0405    BUN 14 03/30/2020 0405    CREATININE 1.2 03/30/2020 0405     (H) 03/30/2020 0405        Component Value Date/Time    CALCIUM 8.6 (L) 03/30/2020 0405    ALKPHOS 113 03/30/2020 0405    AST 22 03/30/2020 0405    ALT <5 (L) 03/30/2020 0405    BILITOT 0.2 03/30/2020 0405    ESTGFRAFRICA >60.0 03/30/2020 0405    EGFRNONAA >60.0 03/30/2020 0405           Lab Results   Component Value Date    TSH 1.136 03/01/2012      Lab Results   Component Value Date    CHOL 190 12/03/2019    CHOL 201 (H) 08/26/2019    CHOL 186 10/11/2017     Lab Results   Component Value Date    HDL 56 12/03/2019    HDL 50 08/26/2019    HDL 68 10/11/2017     Lab Results   Component Value Date    LDLCALC 120.2 12/03/2019    LDLCALC 132.8 08/26/2019    LDLCALC 103.4 10/11/2017     Lab Results   Component Value Date    TRIG 167 (H) 03/30/2020    TRIG 164 (H) 03/29/2020    TRIG 171 (H) 03/27/2020     Lab Results   Component Value Date    CHOLHDL 29.5 12/03/2019    CHOLHDL 24.9 08/26/2019    CHOLHDL 36.6 10/11/2017       PHYSICAL EXAMINATION  Constitutional: Appears well, no distress  Neck: Supple, trachea midline.   Respiratory: No wheezes, even and unlabored.  Cardiovascular: RRR;  no edema.   Lymph: deferred   Skin: warm and dry; no injection site reactions, no acanthosis nigracans observed.  Neuro:patient alert and cooperative, normal affect; steady gait.    Diabetes Foot Exam:    (L) boot     Assessment/Plan    1. Type 2 diabetes mellitus with hyperglycemia, with long-term current use of insulin  Hemoglobin A1c    C-PEPTIDE    Glucose, fasting   2. COVID-19 virus infection     3. Subacute osteomyelitis of left foot     4. Essential hypertension     5. Mixed hyperlipidemia       1. F/u in 3 mos w/ me   Has refills   Continue regimen   a1c next time   a1c goal less than 7.5%  Reach  out to podiatry   2. D/c from hospital 3/31/20  On vent > 7 days  3. See above   Chronic issue  Using boot.   4. Continue med(s)  5.   Lab Results   Component Value Date    LDLCALC 120.2 12/03/2019     Above goal     FOLLOW UP  Follow up in about 3 months (around 7/8/2020).

## 2020-04-09 ENCOUNTER — TELEPHONE (OUTPATIENT)
Dept: PODIATRY | Facility: CLINIC | Age: 50
End: 2020-04-09

## 2020-04-09 ENCOUNTER — OFFICE VISIT (OUTPATIENT)
Dept: PODIATRY | Facility: CLINIC | Age: 50
End: 2020-04-09
Payer: MEDICAID

## 2020-04-09 DIAGNOSIS — U07.1 COVID-19 VIRUS INFECTION: ICD-10-CM

## 2020-04-09 DIAGNOSIS — M86.272 SUBACUTE OSTEOMYELITIS OF LEFT FOOT: ICD-10-CM

## 2020-04-09 DIAGNOSIS — L97.524 FOOT ULCER, LEFT, WITH NECROSIS OF BONE: ICD-10-CM

## 2020-04-09 PROCEDURE — 99213 OFFICE O/P EST LOW 20 MIN: CPT | Mod: 95,,, | Performed by: PODIATRIST

## 2020-04-09 PROCEDURE — 99213 PR OFFICE/OUTPT VISIT, EST, LEVL III, 20-29 MIN: ICD-10-PCS | Mod: 95,,, | Performed by: PODIATRIST

## 2020-04-09 NOTE — TELEPHONE ENCOUNTER
Spoke with rep from [id number].  They will send us a form to complete for his dressing supplies.  Dressing changes to be done by aunt per DPM instructions at virtual visit.

## 2020-04-09 NOTE — PATIENT INSTRUCTIONS
Cleanse wound with saline   Pat dry   Apply alginate to wound surface   Apply foam pad   Lightly wrap foot w/ gauze roll  Change 2-3 times per week   Follow up virtual visit next week

## 2020-04-09 NOTE — TELEPHONE ENCOUNTER
Supply order faxed to [employer].  Sister notified of placed order and contact info for supplier for any questions.

## 2020-04-09 NOTE — TELEPHONE ENCOUNTER
Pt notified of next scheduled appt with Dr. Almonte. Pt states ok thank you . Appointment mailed out

## 2020-04-09 NOTE — PROGRESS NOTES
Subjective:      Patient ID: Indio Ryder Jr. is a 49 y.o. male.    Chief Complaint: No chief complaint on file.      The patient location is: Sister's home ( [place])   The chief complaint leading to consultation is: f/u foot ulcer  Visit type: Virtual visit with synchronous audio and video  Total time spent with patient: 15 min   Each patient to whom he or she provides medical services by telemedicine is:  (1) informed of the relationship between the physician and patient and the respective role of any other health care provider with respect to management of the patient; and (2) notified that he or she may decline to receive medical services by telemedicine and may withdraw from such care at any time.    Notes: video message conducted w/ patient and his iter who is a retired [employer] MA.        Hemoglobin A1C   Date Value Ref Range Status   03/12/2020 12.0 (H) 4.0 - 5.6 % Final     Comment:     ADA Screening Guidelines:  5.7-6.4%  Consistent with prediabetes  >or=6.5%  Consistent with diabetes  High levels of fetal hemoglobin interfere with the HbA1C  assay. Heterozygous hemoglobin variants (HbS, HgC, etc)do  not significantly interfere with this assay.   However, presence of multiple variants may affect accuracy.     12/03/2019 8.4 (H) 4.0 - 5.6 % Final     Comment:     ADA Screening Guidelines:  5.7-6.4%  Consistent with prediabetes  >or=6.5%  Consistent with diabetes  High levels of fetal hemoglobin interfere with the HbA1C  assay. Heterozygous hemoglobin variants (HbS, HgC, etc)do  not significantly interfere with this assay.   However, presence of multiple variants may affect accuracy.     09/12/2019 7.9 (H) 4.0 - 5.6 % Final     Comment:     ADA Screening Guidelines:  5.7-6.4%  Consistent with prediabetes  >or=6.5%  Consistent with diabetes  High levels of fetal hemoglobin interfere with the HbA1C  assay. Heterozygous hemoglobin variants (HbS, HgC, etc)do  not significantly interfere with this assay.    However, presence of multiple variants may affect accuracy.         Review of Systems   Constitution: Negative for chills, decreased appetite, fever and malaise/fatigue.   Skin: Positive for poor wound healing. Negative for color change, flushing and itching.   Gastrointestinal: Negative for nausea and vomiting.   Neurological: Negative for loss of balance, numbness and paresthesias.           Objective:       There were no vitals filed for this visit.     Physical Exam   Constitutional: He is oriented to person, place, and time. He appears well-developed and well-nourished.   Cardiovascular:   Dorsalis pedis and posterior tibial pulses are diminished Bilaterally. Toes are cool to touch. Feet are warm proximally.There is decreased digital hair. Skin is atrophic, slightly hyperpigmented, and mildly edematous       Musculoskeletal: Normal range of motion.   Adequate joint range of motion without pain, limitation, nor crepitation Bilateral feet and ankle joints. Muscle strength is 5/5 in all groups bilaterally.    S/p b/l 5th ray amp      Neurological: He is alert and oriented to person, place, and time.   [place] 5.07 monofilamant testing is diminished Kenji feet. Sharp/dull sensation diminished Bilaterally. Light touch absent Bilaterally.       Skin: Skin is dry. No ecchymosis and no lesion noted. No erythema.     left, Sub 3rd MPJ wound w/ granular base. Mild cici wound maceration. No acute drainage observed. No cici wound redness or skin compromise. No visible bone   Psychiatric: He has a normal mood and affect. His behavior is normal.                 Assessment:       Encounter Diagnoses   Name Primary?   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      Continue IV bax as per ID   Wound care instructions dispensed to patient   Medcare contacted , will ship supplies to home  Family will change bandage 2-3 x/week   No visual signs of acute infection

## 2020-04-13 ENCOUNTER — TELEPHONE (OUTPATIENT)
Dept: INTERNAL MEDICINE | Facility: CLINIC | Age: 50
End: 2020-04-13

## 2020-04-13 ENCOUNTER — SSC ENCOUNTER (OUTPATIENT)
Dept: ADMINISTRATIVE | Facility: OTHER | Age: 50
End: 2020-04-13

## 2020-04-13 NOTE — TELEPHONE ENCOUNTER
Called pt, no answer. vm left to call the office back the patient does have podiatry appt scheduled

## 2020-04-13 NOTE — PROGRESS NOTES
Please note the following patient's information has been forwarded to the [employer] case management office on 4/13/2020.    Please contact Outpatient Care Management at Ext. [phone] with questions.    Thank you,    Josefina Ornelas, [facility]  Outpatient Case Mgmnt  [phone]

## 2020-04-14 ENCOUNTER — PATIENT OUTREACH (OUTPATIENT)
Dept: ADMINISTRATIVE | Facility: OTHER | Age: 50
End: 2020-04-14

## 2020-04-14 ENCOUNTER — TELEPHONE (OUTPATIENT)
Dept: INFECTIOUS DISEASES | Facility: CLINIC | Age: 50
End: 2020-04-14

## 2020-04-14 NOTE — TELEPHONE ENCOUNTER
Spoke with Jhon at [facility] and informed him that the pt will have his vancomycin decreased to 1 gran q12 per Dr. aleman

## 2020-04-14 NOTE — TELEPHONE ENCOUNTER
----- Message from Margie Velasco MA sent at 4/14/2020 10:04 AM CDT -----  [facility] called and said the pt vanc trough is 20.9, please advise

## 2020-04-15 ENCOUNTER — OFFICE VISIT (OUTPATIENT)
Dept: PODIATRY | Facility: CLINIC | Age: 50
End: 2020-04-15
Payer: MEDICAID

## 2020-04-15 DIAGNOSIS — U07.1 COVID-19 VIRUS INFECTION: ICD-10-CM

## 2020-04-15 DIAGNOSIS — M86.272 SUBACUTE OSTEOMYELITIS OF LEFT FOOT: ICD-10-CM

## 2020-04-15 DIAGNOSIS — L97.524 FOOT ULCER, LEFT, WITH NECROSIS OF BONE: ICD-10-CM

## 2020-04-15 PROCEDURE — 99212 OFFICE O/P EST SF 10 MIN: CPT | Mod: 95,,, | Performed by: PODIATRIST

## 2020-04-15 PROCEDURE — 99212 PR OFFICE/OUTPT VISIT, EST, LEVL II, 10-19 MIN: ICD-10-PCS | Mod: 95,,, | Performed by: PODIATRIST

## 2020-04-16 NOTE — PROGRESS NOTES
Subjective:      Patient ID: Indio Ryder Jr. is a 49 y.o. male.    Chief Complaint: No chief complaint on file.      The patient location is: Sister's home ( [place])   The chief complaint leading to consultation is: f/u foot ulcer  Visit type: Virtual visit with synchronous audio and video  Total time spent with patient: 13 min   Each patient to whom he or she provides medical services by telemedicine is:  (1) informed of the relationship between the physician and patient and the respective role of any other health care provider with respect to management of the patient; and (2) notified that he or she may decline to receive medical services by telemedicine and may withdraw from such care at any time.    Notes: video message conducted w/ patient and his sister  who is a retired [facility] [employer] [facility] MA.  Family has wound care supplies       Hemoglobin A1C   Date Value Ref Range Status   03/12/2020 12.0 (H) 4.0 - 5.6 % Final     Comment:     ADA Screening Guidelines:  5.7-6.4%  Consistent with prediabetes  >or=6.5%  Consistent with diabetes  High levels of fetal hemoglobin interfere with the HbA1C  assay. Heterozygous hemoglobin variants (HbS, HgC, etc)do  not significantly interfere with this assay.   However, presence of multiple variants may affect accuracy.     12/03/2019 8.4 (H) 4.0 - 5.6 % Final     Comment:     ADA Screening Guidelines:  5.7-6.4%  Consistent with prediabetes  >or=6.5%  Consistent with diabetes  High levels of fetal hemoglobin interfere with the HbA1C  assay. Heterozygous hemoglobin variants (HbS, HgC, etc)do  not significantly interfere with this assay.   However, presence of multiple variants may affect accuracy.     09/12/2019 7.9 (H) 4.0 - 5.6 % Final     Comment:     ADA Screening Guidelines:  5.7-6.4%  Consistent with prediabetes  >or=6.5%  Consistent with diabetes  High levels of fetal hemoglobin interfere with the HbA1C  assay. Heterozygous hemoglobin variants (HbS, HgC, etc)do  not  significantly interfere with this assay.   However, presence of multiple variants may affect accuracy.         Review of Systems   Constitution: Negative for chills, decreased appetite, fever and malaise/fatigue.   Skin: Positive for poor wound healing. Negative for color change, flushing and itching.   Gastrointestinal: Negative for nausea and vomiting.   Neurological: Negative for loss of balance, numbness and paresthesias.           Objective:       There were no vitals filed for this visit.     Physical Exam   Constitutional: He is oriented to person, place, and time. He appears well-developed and well-nourished.   Musculoskeletal:     S/p b/l 5th ray amp      Neurological: He is alert and oriented to person, place, and time.          Skin: Skin is dry. No ecchymosis and no lesion noted. No erythema.     left, Sub 3rd MPJ wound w/ granular base. Mild cici wound maceration. No acute drainage observed. No cici wound redness or skin compromise. No visible bone   Psychiatric: He has a normal mood and affect. His behavior is normal.                 Assessment:       Encounter Diagnoses   Name Primary?   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      Continue IV abx as per ID   Continue home wound care   No issues w/ obtaining supplies   Family will change bandage  MWF   No  visual signs of acute infection

## 2020-04-21 ENCOUNTER — TELEPHONE (OUTPATIENT)
Dept: INFECTIOUS DISEASES | Facility: CLINIC | Age: 50
End: 2020-04-21

## 2020-04-21 ENCOUNTER — PATIENT OUTREACH (OUTPATIENT)
Dept: ADMINISTRATIVE | Facility: OTHER | Age: 50
End: 2020-04-21

## 2020-04-21 ENCOUNTER — LAB VISIT (OUTPATIENT)
Dept: LAB | Facility: HOSPITAL | Age: 50
End: 2020-04-21
Attending: INTERNAL MEDICINE
Payer: MEDICAID

## 2020-04-21 DIAGNOSIS — M86.10 ACUTE OSTEOMYELITIS: ICD-10-CM

## 2020-04-21 DIAGNOSIS — D50.0 IRON DEFICIENCY ANEMIA DUE TO CHRONIC BLOOD LOSS: ICD-10-CM

## 2020-04-21 DIAGNOSIS — M86.10 ACUTE OSTEOMYELITIS: Primary | ICD-10-CM

## 2020-04-21 LAB
BASOPHILS # BLD AUTO: 0.04 K/UL (ref 0–0.2)
BASOPHILS NFR BLD: 0.7 % (ref 0–1.9)
DIFFERENTIAL METHOD: ABNORMAL
EOSINOPHIL # BLD AUTO: 0.3 K/UL (ref 0–0.5)
EOSINOPHIL NFR BLD: 4.9 % (ref 0–8)
ERYTHROCYTE [DISTWIDTH] IN BLOOD BY AUTOMATED COUNT: 13.8 % (ref 11.5–14.5)
HCT VFR BLD AUTO: 34.1 % (ref 40–54)
HGB BLD-MCNC: 10.2 G/DL (ref 14–18)
IMM GRANULOCYTES # BLD AUTO: 0.01 K/UL (ref 0–0.04)
IMM GRANULOCYTES NFR BLD AUTO: 0.2 % (ref 0–0.5)
LYMPHOCYTES # BLD AUTO: 2 K/UL (ref 1–4.8)
LYMPHOCYTES NFR BLD: 33.8 % (ref 18–48)
MCH RBC QN AUTO: 28.5 PG (ref 27–31)
MCHC RBC AUTO-ENTMCNC: 29.9 G/DL (ref 32–36)
MCV RBC AUTO: 95 FL (ref 82–98)
MONOCYTES # BLD AUTO: 0.6 K/UL (ref 0.3–1)
MONOCYTES NFR BLD: 10.4 % (ref 4–15)
NEUTROPHILS # BLD AUTO: 3 K/UL (ref 1.8–7.7)
NEUTROPHILS NFR BLD: 50.2 % (ref 38–73)
NRBC BLD-RTO: 0 /100 WBC
PLATELET # BLD AUTO: 322 K/UL (ref 150–350)
PMV BLD AUTO: 9.8 FL (ref 9.2–12.9)
RBC # BLD AUTO: 3.58 M/UL (ref 4.6–6.2)
WBC # BLD AUTO: 5.95 K/UL (ref 3.9–12.7)

## 2020-04-21 PROCEDURE — 36415 COLL VENOUS BLD VENIPUNCTURE: CPT

## 2020-04-21 PROCEDURE — 85025 COMPLETE CBC W/AUTO DIFF WBC: CPT

## 2020-04-21 NOTE — TELEPHONE ENCOUNTER
----- Message from Margie Velasco MA sent at 4/21/2020  9:54 AM CDT -----  Pt labs came over the fax as critical, RBC is 2.35 and Hemoglobin is 6.6, please advise

## 2020-04-21 NOTE — TELEPHONE ENCOUNTER
----- Message from David Foy MD sent at 4/21/2020  3:08 PM CDT -----  Notify patient that his hemoglobin is actually 10.2 which is okay.  That lab value from earlier must have been an error.

## 2020-04-22 ENCOUNTER — OFFICE VISIT (OUTPATIENT)
Dept: PODIATRY | Facility: CLINIC | Age: 50
End: 2020-04-22
Payer: MEDICAID

## 2020-04-22 DIAGNOSIS — L97.524 FOOT ULCER, LEFT, WITH NECROSIS OF BONE: ICD-10-CM

## 2020-04-22 PROCEDURE — 99212 OFFICE O/P EST SF 10 MIN: CPT | Mod: 95,,, | Performed by: PODIATRIST

## 2020-04-22 PROCEDURE — 99212 PR OFFICE/OUTPT VISIT, EST, LEVL II, 10-19 MIN: ICD-10-PCS | Mod: 95,,, | Performed by: PODIATRIST

## 2020-04-23 NOTE — PROGRESS NOTES
Subjective:      Patient ID: Indio Ryder Jr. is a 49 y.o. male.    Chief Complaint: No chief complaint on file.      The patient location is: Sister's home ( [place])   The chief complaint leading to consultation is: f/u foot ulcer  Visit type: Virtual visit with synchronous audio and video  Total time spent with patient: 12 min   Each patient to whom he or she provides medical services by telemedicine is:  (1) informed of the relationship between the physician and patient and the respective role of any other health care provider with respect to management of the patient; and (2) notified that he or she may decline to receive medical services by telemedicine and may withdraw from such care at any time.    Notes: video message conducted w/ patient and his sister  who is a retired [employer] MA.  Family been to a wound care clinic this Monday       Hemoglobin A1C   Date Value Ref Range Status   03/12/2020 12.0 (H) 4.0 - 5.6 % Final     Comment:     ADA Screening Guidelines:  5.7-6.4%  Consistent with prediabetes  >or=6.5%  Consistent with diabetes  High levels of fetal hemoglobin interfere with the HbA1C  assay. Heterozygous hemoglobin variants (HbS, HgC, etc)do  not significantly interfere with this assay.   However, presence of multiple variants may affect accuracy.     12/03/2019 8.4 (H) 4.0 - 5.6 % Final     Comment:     ADA Screening Guidelines:  5.7-6.4%  Consistent with prediabetes  >or=6.5%  Consistent with diabetes  High levels of fetal hemoglobin interfere with the HbA1C  assay. Heterozygous hemoglobin variants (HbS, HgC, etc)do  not significantly interfere with this assay.   However, presence of multiple variants may affect accuracy.     09/12/2019 7.9 (H) 4.0 - 5.6 % Final     Comment:     ADA Screening Guidelines:  5.7-6.4%  Consistent with prediabetes  >or=6.5%  Consistent with diabetes  High levels of fetal hemoglobin interfere with the HbA1C  assay. Heterozygous hemoglobin variants (HbS, HgC,  etc)do  not significantly interfere with this assay.   However, presence of multiple variants may affect accuracy.         Review of Systems   Constitution: Negative for chills, decreased appetite, fever and malaise/fatigue.   Skin: Positive for poor wound healing. Negative for color change, flushing and itching.   Gastrointestinal: Negative for nausea and vomiting.   Neurological: Negative for loss of balance, numbness and paresthesias.           Objective:       There were no vitals filed for this visit.     Physical Exam   Constitutional: He is oriented to person, place, and time. He appears well-developed and well-nourished.   Musculoskeletal:     S/p b/l 5th ray amp      Neurological: He is alert and oriented to person, place, and time.          Skin: Skin is dry. No ecchymosis and no lesion noted. No erythema.     left, Sub 3rd MPJ wound w/ granular base. Minimal cici wound maceration. No acute drainage observed. No cici wound redness or skin compromise. No visible bone   Psychiatric: He has a normal mood and affect. His behavior is normal.                 Assessment:       Encounter Diagnoses   Name Primary?   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      Continue IV abx as per ID   Patient has new wound care MD at [facility] in [place], [place]. Will have them take over wound care services

## 2020-04-25 ENCOUNTER — NURSE TRIAGE (OUTPATIENT)
Dept: ADMINISTRATIVE | Facility: CLINIC | Age: 50
End: 2020-04-25

## 2020-04-25 NOTE — TELEPHONE ENCOUNTER
Pt has reported constipation. Pt reports pain to rectum. Pt reports increased urination. Pt denies abdominal pain. Pt last BM was a few days ago. Reports BM was soft. Pt still taking stool softeners.     Reason for Disposition   Leaking stool    Additional Information   Negative: Patient sounds very sick or weak to the triager   Negative: [1] Vomiting AND [2] abdomen looks much more swollen than usual   Negative: [1] Vomiting AND [2] contains bile (green color)   Negative: [1] Constant abdominal pain AND [2] present > 2 hours   Negative: [1] Rectal pain or fullness from fecal impaction (rectum full of stool) AND [2] NOT better after SITZ bath, suppository or enema   Negative: [1] Intermittent mild abdominal pain AND [2] fever   Negative: Abdomen is more swollen than usual   Negative: Last bowel movement (BM) > 4 days ago    Protocols used: CONSTIPATION-A-AH

## 2020-04-28 ENCOUNTER — TELEPHONE (OUTPATIENT)
Dept: INFECTIOUS DISEASES | Facility: CLINIC | Age: 50
End: 2020-04-28

## 2020-04-29 ENCOUNTER — TELEPHONE (OUTPATIENT)
Dept: INFECTIOUS DISEASES | Facility: CLINIC | Age: 50
End: 2020-04-29

## 2020-04-29 NOTE — TELEPHONE ENCOUNTER
Spoke with [facility] and gave the verbal order to pull the pt PICC line per Dr. Foy

## 2020-06-03 ENCOUNTER — TELEPHONE (OUTPATIENT)
Dept: INTERNAL MEDICINE | Facility: CLINIC | Age: 50
End: 2020-06-03

## 2020-06-03 ENCOUNTER — OFFICE VISIT (OUTPATIENT)
Dept: INTERNAL MEDICINE | Facility: CLINIC | Age: 50
End: 2020-06-03
Payer: MEDICAID

## 2020-06-03 DIAGNOSIS — R21 RASH AND NONSPECIFIC SKIN ERUPTION: Primary | ICD-10-CM

## 2020-06-03 DIAGNOSIS — L29.9 PRURITUS: ICD-10-CM

## 2020-06-03 DIAGNOSIS — R42 DIZZINESS: ICD-10-CM

## 2020-06-03 PROCEDURE — 99214 OFFICE O/P EST MOD 30 MIN: CPT | Mod: 95,,, | Performed by: PHYSICIAN ASSISTANT

## 2020-06-03 PROCEDURE — 99214 PR OFFICE/OUTPT VISIT, EST, LEVL IV, 30-39 MIN: ICD-10-PCS | Mod: 95,,, | Performed by: PHYSICIAN ASSISTANT

## 2020-06-03 NOTE — PATIENT INSTRUCTIONS
Stop Benadryl and instead start Zyrtec 10 mg over the counter daily    Hold your losartan for 2 weeks and see if dizziness improves.  Make sure to wear your compression stockings daily and change position slowly.  Check your blood pressure sitting and standing and write down readings.    You need to see dermatology for the rash.    Lab work is ordered - my staff will call you to assist with scheduling.      Self-Care for Skin Rashes     Pat your skin dry. Do not rub.     When your skin reacts to a substance your body is sensitive to, it can cause a rash. You can treat most rashes at home by keeping the skin clean and dry. Many rashes may get better on their own within 2 to 3 days. You may need medical attention if your rash itches, drains, or hurts, particularly if the rash is getting worse.  What can cause a skin rash?  · Sun poisoning, caused by too much exposure to the sun  · An irritant or allergic reaction to a certain type of food, plant, or chemical, such as  shellfish, poison ivy, and or cleaning products  · An infection caused by a fungus (ringworm), virus (chickenpox), or bacteria (strep)  · Bites or infestation caused by insects or pests, such as ticks, lice, or mites  · Dry skin, which is often seen during the winter months and in older people  How can I control itching and skin damage?  · Take soothing lukewarm baths in a colloidal oatmeal product. You can buy this at the [employer].  · Do your best not to scratch. Clip fingernails short, especially in young children, to reduce skin damage if scratching does occur.  · Use moisturizing skin lotion instead of scratching your dry skin.  · Use sunscreen whenever going out into direct sun.  · Use only mild cleansing agents whenever possible.  · Wash with mild, nonirritating soap and warm water.  · Wear clothing that breathes, such as cotton shirts or canvas shoes.  · If fluid is seeping from the rash, cover it loosely with clean gauze to absorb the  discharge.  · Many rashes are contagious. Prevent the rash from spreading to others by washing your hands often before or after touching others with any skin rash.  Use medicine  · Antihistamines such as diphenhydramine can help control itching. But use with caution because they can make you drowsy.  · Using over-the-counter hydrocortisone cream on small rashes may help reduce swelling and itching  · Most over-the-counter antifungal medicines can treat athletes foot and many other fungal infections of the skin.  Check with your healthcare provider  Call your healthcare provider if:  · You were told that you have a fungal infection on your skin to make sure you have the correct type of medicine.  · You have questions or concerns about medicines or their side effects.     Call 911  Call 911 if either of these occur:  · Your tongue or lips start to swell  · You have difficulty breathing      Call your healthcare provider  Call your healthcare provider if any of these occur:  · Temperature of more than 101.0°F (38.3°C), or as directed  · Sore throat, a cough, or unusual fatigue  · Red, oozy, or painful rash gets worse. These are signs of infection.  · Rash covers your face, genitals, or most of your body  · Crusty sores or red rings that begin to spread  · You were exposed to someone who has a contagious rash, such as scabies or lice.  · Red bulls-eye rash with a white center (a sign of Lyme disease)  · You were told that you have resistant bacteria (MRSA) on your skin.   Date Last Reviewed: 5/12/2015  © 0546-5099 [employer]. [address] [place] [address], [place] [address]. All rights reserved. This information is not intended as a substitute for professional medical care. Always follow your healthcare professional's instructions.        Orthostatic Low Blood Pressure (Hypotension)  A blood pressure reading is made up of 2 numbers There is a top number over a bottom number. The top number is the systolic  pressure. The bottom number is the diastolic pressure. A normal blood pressure is a systolic pressure less than 120 over a diastolic pressure less than 80. Low blood pressure (hypotension) is a blood pressure that is less than what is normal for you.  Orthostatic hypotension is a type of low blood pressure that occurs only when you change position from lying to standing. It can cause dizziness, lightheadedness, or fainting.  Some medicines can cause orthostatic hypotension. These include:  · High blood pressure medicines  · Water pills (diuretics)  · Some heart medicines  · Some antidepressants  · Pain, anxiety, sedative, and sleeping medicines  Other causes include:  · Dehydration from vomiting, diarrhea, or not getting enough fluids  · Severe infection  · High fever  · Blood loss, such as bleeding from the stomach or intestines  · Neurological diseases that impair the autonomic nervous system  Treatment will depend on what is causing your low blood pressure.  Home care  Follow these guidelines when caring for yourself at home:  · Rest until your symptoms get better.  · Change positions slowly from lying to standing. When getting out of bed, sit on the side of the bed with your legs down for at least 30 seconds before standing. This gives your body time to adjust to the position change.  · Follow the treatment plan described by your healthcare provider.  Follow-up care  Follow up with your healthcare provider, or as advised.  When to seek medical advice  Call your healthcare provider right away if any of these occur:  · Dizziness, lightheadedness, or fainting  · Black or red color in your stools or vomit  · Diarrhea or vomiting that doesnt go away  · You arent able to eat or drink  · Fever of 100.4°F (38°C) or higher, or as directed by your healthcare provider  · Burning when you urinate  · Foul-smelling urine  Date Last Reviewed: 12/1/2016  © 7567-0747 [employer]. [address] [place] [address]  [place] [address]. All rights reserved. This information is not intended as a substitute for professional medical care. Always follow your healthcare professional's instructions.

## 2020-06-03 NOTE — PROGRESS NOTES
Subjective:       Patient ID: Indio Ryder Jr. is a 49 y.o. male.        Chief Complaint: Rash    Indio Ryder Jr. is an established patient of Lorena Thakur MD here today for telemedicine visit.    The patient location is: [place]  The chief complaint leading to consultation is: itching    Visit type: audiovisual    Face to Face time with patient: 17 minutes  20 minutes of total time spent on the encounter, which includes face to face time and non-face to face time preparing to see the patient (eg, review of tests), Obtaining and/or reviewing separately obtained history, Documenting clinical information in the electronic or other health record, Independently interpreting results (not separately reported) and communicating results to the patient/family/caregiver, or Care coordination (not separately reported).     Each patient to whom he or she provides medical services by telemedicine is:  (1) informed of the relationship between the physician and patient and the respective role of any other health care provider with respect to management of the patient; and (2) notified that he or she may decline to receive medical services by telemedicine and may withdraw from such care at any time.    Telemedicine visit with Dr. Yarbrough 4/5/20 seen post hospital discharge for DKA and COVID 19.  Also with diabetic foot ulcer.      Rash and itching-    Itching for months, little red bumps appear when he scratches.  He has tried multiple OTC creams without improvement.  The itching comes and goes.  It does spare the face but affects the rest of his body.  When he applies moisturizer the rash disappears.  He has been taking benadryl 25 mg which does give him some mild improvement in itching.  Previously thought to be secondary to ancef and changed to vancomycin, completed abx and PICC line pulled end of April.  Not taking gabapentin regularly.    Dizziness/LH - occurs when he is out in the sun at times, also notices it  when he stands up quickly, with h/o orthostatic hypotension, taking losartan 25 mg 1/2 tablet intermittently, if BP is low he doesn't take it for a few days, intermittently checking BP but not regularly, does not check it sitting to standing as previously recommended, does wear compression stockings, no chest pain or shortness of breath, recalls a few BP readings 92/67 and 115/72, unsure when these were taken    DM - blood sugar varies 80 -119, up to 140 later in the day, follows with Gigi Clay NP  Lab Results       Component                Value               Date                       HGBA1C                   12.0 (H)            03/12/2020                 HGBA1C                   8.4 (H)             12/03/2019                 HGBA1C                   7.9 (H)             09/12/2019                   Review of Systems   Constitutional: Negative for activity change, appetite change, chills, fatigue, fever and unexpected weight change.   HENT: Negative for congestion, hearing loss, rhinorrhea, sore throat and trouble swallowing.    Eyes: Negative for discharge and visual disturbance.   Respiratory: Negative for cough, chest tightness, shortness of breath and wheezing.    Cardiovascular: Negative for chest pain, palpitations and leg swelling.   Gastrointestinal: Negative for abdominal pain, blood in stool, constipation, diarrhea, nausea and vomiting.   Endocrine: Negative for polydipsia and polyuria.   Genitourinary: Negative for difficulty urinating, dysuria, frequency, hematuria and urgency.   Musculoskeletal: Negative for arthralgias, back pain, joint swelling and neck pain.   Skin: Positive for rash.   Neurological: Positive for dizziness and light-headedness. Negative for syncope, weakness and headaches.   Psychiatric/Behavioral: Negative for confusion, dysphoric mood and sleep disturbance. The patient is not nervous/anxious.        Objective:      Physical Exam   Constitutional: He appears well-developed and  "well-nourished. No distress.   HENT:   Head: Normocephalic and atraumatic.   Right Ear: External ear normal.   Left Ear: External ear normal.   Neck: Neck supple.   Pulmonary/Chest: Effort normal.   Neurological: He is alert.   Skin: He is not diaphoretic.   No rash currently   Psychiatric: He has a normal mood and affect.       Assessment:       1. Rash and nonspecific skin eruption    2. Pruritus    3. Uncontrolled type 2 diabetes mellitus    4. Dizziness        Plan:       Indio was seen today for rash.    Diagnoses and all orders for this visit:    Rash and nonspecific skin eruption - stop benadryl and instead start zyrtec, continue to moisturize skin well  -     Ambulatory referral/consult to Dermatology; Future    Pruritus - continue to moisturize skin well    Uncontrolled type 2 diabetes mellitus  -     Comprehensive metabolic panel; Future    Dizziness - continue regular use of compression stockings, change position slowly, hold losartan x 2 weeks and monitor dizziness and BP (both sitting and standing), write down readings, f/u 2 weeks vi portal or virtual visit, if not improving likely needs in person visit    F/u 2 weeks.  Due for f/u with Gigi next month, will get labs scheduled.    Pt has been given instructions populated from [employer] database and has verbalized understanding of the after visit summary and information contained wherein.    Follow up with a primary care provider. May go to ER for acute shortness of breath, lightheadedness, fever, or any other emergent complaints or changes in condition.    "This note will be shared with the patient"    No future appointments.            "

## 2020-06-03 NOTE — TELEPHONE ENCOUNTER
Please schedule patient for dermatology appointment - referral placed    Please schedule CMP and also link orders from Gigi Melton NP (Hemoglobin A1C, C peptide, fasting glucose) - labs need to be after 6/12/20    Please call to let patient know when complete

## 2020-06-04 NOTE — TELEPHONE ENCOUNTER
Please continue to call patient to get everything complete as below as all orders need to be linked.  Thanks!    If unable to get him after 3 attempts let me know.

## 2020-06-12 ENCOUNTER — LAB VISIT (OUTPATIENT)
Dept: LAB | Facility: HOSPITAL | Age: 50
End: 2020-06-12
Payer: MEDICAID

## 2020-06-12 DIAGNOSIS — E11.65 TYPE 2 DIABETES MELLITUS WITH HYPERGLYCEMIA, WITH LONG-TERM CURRENT USE OF INSULIN: ICD-10-CM

## 2020-06-12 DIAGNOSIS — Z79.4 TYPE 2 DIABETES MELLITUS WITH HYPERGLYCEMIA, WITH LONG-TERM CURRENT USE OF INSULIN: ICD-10-CM

## 2020-06-12 LAB
ALBUMIN SERPL BCP-MCNC: 3.4 G/DL (ref 3.5–5.2)
ALP SERPL-CCNC: 127 U/L (ref 55–135)
ALT SERPL W/O P-5'-P-CCNC: 16 U/L (ref 10–44)
ANION GAP SERPL CALC-SCNC: 8 MMOL/L (ref 8–16)
AST SERPL-CCNC: 17 U/L (ref 10–40)
BILIRUB SERPL-MCNC: 0.3 MG/DL (ref 0.1–1)
BUN SERPL-MCNC: 14 MG/DL (ref 6–20)
C PEPTIDE SERPL-MCNC: 0.31 NG/ML (ref 0.78–5.19)
CALCIUM SERPL-MCNC: 9.4 MG/DL (ref 8.7–10.5)
CHLORIDE SERPL-SCNC: 107 MMOL/L (ref 95–110)
CO2 SERPL-SCNC: 28 MMOL/L (ref 23–29)
CREAT SERPL-MCNC: 1 MG/DL (ref 0.5–1.4)
EST. GFR  (AFRICAN AMERICAN): >60 ML/MIN/1.73 M^2
EST. GFR  (NON AFRICAN AMERICAN): >60 ML/MIN/1.73 M^2
ESTIMATED AVG GLUCOSE: 160 MG/DL (ref 68–131)
GLUCOSE SERPL-MCNC: 78 MG/DL (ref 70–110)
HBA1C MFR BLD HPLC: 7.2 % (ref 4–5.6)
POTASSIUM SERPL-SCNC: 3.9 MMOL/L (ref 3.5–5.1)
PROT SERPL-MCNC: 7.4 G/DL (ref 6–8.4)
SODIUM SERPL-SCNC: 143 MMOL/L (ref 136–145)

## 2020-06-12 PROCEDURE — 80053 COMPREHEN METABOLIC PANEL: CPT

## 2020-06-12 PROCEDURE — 84681 ASSAY OF C-PEPTIDE: CPT

## 2020-06-12 PROCEDURE — 36415 COLL VENOUS BLD VENIPUNCTURE: CPT

## 2020-06-12 PROCEDURE — 83036 HEMOGLOBIN GLYCOSYLATED A1C: CPT

## 2020-07-23 ENCOUNTER — TELEPHONE (OUTPATIENT)
Dept: INTERNAL MEDICINE | Facility: CLINIC | Age: 50
End: 2020-07-23

## 2020-07-28 ENCOUNTER — OFFICE VISIT (OUTPATIENT)
Dept: INTERNAL MEDICINE | Facility: CLINIC | Age: 50
End: 2020-07-28
Payer: MEDICAID

## 2020-07-28 VITALS
BODY MASS INDEX: 29.1 KG/M2 | HEART RATE: 91 BPM | WEIGHT: 219.56 LBS | OXYGEN SATURATION: 99 % | DIASTOLIC BLOOD PRESSURE: 84 MMHG | HEIGHT: 73 IN | SYSTOLIC BLOOD PRESSURE: 122 MMHG

## 2020-07-28 DIAGNOSIS — G56.03 BILATERAL CARPAL TUNNEL SYNDROME: Primary | ICD-10-CM

## 2020-07-28 DIAGNOSIS — M54.12 CERVICAL RADICULOPATHY: ICD-10-CM

## 2020-07-28 DIAGNOSIS — E11.40 TYPE 2 DIABETES MELLITUS WITH DIABETIC NEUROPATHY, UNSPECIFIED WHETHER LONG TERM INSULIN USE: ICD-10-CM

## 2020-07-28 DIAGNOSIS — M62.542 ATROPHY OF MUSCLE OF LEFT HAND: ICD-10-CM

## 2020-07-28 DIAGNOSIS — M54.12 RADICULOPATHY, CERVICAL REGION: ICD-10-CM

## 2020-07-28 PROCEDURE — 99999 PR PBB SHADOW E&M-EST. PATIENT-LVL V: CPT | Mod: PBBFAC,,, | Performed by: INTERNAL MEDICINE

## 2020-07-28 PROCEDURE — 99214 OFFICE O/P EST MOD 30 MIN: CPT | Mod: S$PBB,,, | Performed by: INTERNAL MEDICINE

## 2020-07-28 PROCEDURE — 99214 PR OFFICE/OUTPT VISIT, EST, LEVL IV, 30-39 MIN: ICD-10-PCS | Mod: S$PBB,,, | Performed by: INTERNAL MEDICINE

## 2020-07-28 PROCEDURE — 99215 OFFICE O/P EST HI 40 MIN: CPT | Mod: PBBFAC | Performed by: INTERNAL MEDICINE

## 2020-07-28 PROCEDURE — 99999 PR PBB SHADOW E&M-EST. PATIENT-LVL V: ICD-10-PCS | Mod: PBBFAC,,, | Performed by: INTERNAL MEDICINE

## 2020-07-28 NOTE — PROGRESS NOTES
Subjective:       Patient ID: Indio Ryder Jr. is a 50 y.o. male.    Chief Complaint: Neck Pain and Shoulder Pain    Neck Pain   Pertinent negatives include no chest pain or headaches.   Shoulder Pain   Pertinent negatives include no headaches.      C/o severe pain L neck extending into L upper arm.    Began suddenly.  Can't describe the pain.  Heat may help.  Not tingly, numb.  Same pain years ago was attributed to OA.  He has tried tylenol and muscle relaxer.  Feels different than rotator cuff pain.  He is already taking gabapentin.  Fatigue limits increasing dose.    EMG in January:  1. A severe and primarily axonal length dependant sensorimotor polyneuropathy in the lower and upper extremities consistent with the patients history of diabetes;   2. Based upon the abnormalities in bilateral transcarpal comparison evaluations, there is likely bilateral carpal tunnel syndrome, but the diabetic polyneuropathy makes for a definitive diagnosis. There is evidence of denervation in bilateral APB muscles, but these findings are potentially explained by lower cervical radiculopathies;   3. Acute on chronic denervation in the right C5 and/or C6 myotomes and in bilateral C8 and/or T1 myotomes suggestive of radiculopathies at those levels. Severe diabetic polyneuropathy can also cause active denervation (positive waves and fibrillations) primarily in distal muscles. Imaging of the cervical spine with MRI is recommended.     Covid infection in March.    Continues to itch.  Zyrtec helped a little.    He has upcoming dermatology appt.    Syncopal episode 3 times assoc with hypotension in Feb.  Feels lightheaded when in the sun, and presyncopal.          Review of Systems   Constitutional: Negative for activity change and unexpected weight change.   Respiratory: Negative for chest tightness and shortness of breath.    Cardiovascular: Negative for chest pain and leg swelling.   Gastrointestinal: Negative for abdominal pain.    Musculoskeletal: Positive for neck pain.   Neurological: Negative for headaches.   Psychiatric/Behavioral: Negative for dysphoric mood.         Objective:    122/84  Physical Exam  Constitutional:       General: He is not in acute distress.     Appearance: Normal appearance. He is obese. He is not ill-appearing or diaphoretic.   Musculoskeletal:      Comments: LUE elevation reduced to about 90 degrees.   Neurological:      General: No focal deficit present.      Mental Status: He is alert and oriented to person, place, and time.      Comments: Atrophy of L hand musculature.  Hyporeflexic UE's.         Assessment:       1. Bilateral carpal tunnel syndrome    2. Cervical radiculopathy    3. Type 2 diabetes mellitus with diabetic neuropathy, unspecified whether long term insulin use    4. Atrophy of muscle of left hand    5. Radiculopathy, cervical region        Plan:       Indio was seen today for neck pain and shoulder pain.    Diagnoses and all orders for this visit:    Bilateral carpal tunnel syndrome    Cervical radiculopathy  -     Ambulatory referral/consult to Physical/Occupational Therapy; Future    Type 2 diabetes mellitus with diabetic neuropathy, unspecified whether long term insulin use    Atrophy of muscle of left hand    Radiculopathy, cervical region  -     MRI Cervical Spine Without Contrast; Future         He declines CT injection.

## 2020-07-28 NOTE — PATIENT INSTRUCTIONS
Understanding Carpal Tunnel Syndrome    The carpal tunnel is a narrow space inside the wrist. It is ringed by bone and a band of tough tissue called the transverse carpal ligament. A major nerve called the median nerve runs from the forearm into the hand through the carpal tunnel. Tendons also run through the carpal tunnel.  With carpal tunnel syndrome, the tendons or nearby tissues within the carpal tunnel may swell or thicken. Or the transverse carpal ligament may harden and shorten. This narrows the space in the carpal tunnel and puts pressure on the median nerve. This pressure leads to tingling and numbness of the hand and wrist. In time, the condition can make even simple tasks hard to do.  What causes carpal tunnel syndrome?  Doctors arent entirely clear why the condition occurs. Certain things may make a person more likely to have it. These include:  · Being female  · Being pregnant  · Being overweight  · Having diabetes or rheumatoid arthritis  Symptoms of carpal tunnel syndrome  Symptoms often come and go. At first, symptoms may occur mainly at night. Later, they may be noticed during the day as well. They may get worse with activities such as driving, reading, typing, or holding a phone. Symptoms can include:  · Tingling and numbness in the hand or wrist  · Sharp pain that shoots up the arm or down to the fingers  · Hand stiffness or cramping, especially in the morning  · Trouble making a fist  · Hand weakness and clumsiness  Treatment for carpal tunnel syndrome  Certain treatments help reduce the pressure on the median nerve and relieve symptoms. Choices for treatment may include one or more of the following:  · Wrist splint. This involves wearing a special brace on the wrist and hand. The splint holds the wrist straight, in a neutral position. This helps keep the carpal tunnel as open as possible.  · Cortisone shots. Cortisone is a medicine that helps reduce swelling. It is injected directly into the  wrist. It helps shrink tissues inside the carpal tunnel. This relieves symptoms for a time.  · Pain medicines. You may take over-the-counter or prescription medicines to help reduce swelling and relieve symptoms.  · Surgery. If the condition doesnt respond to other treatments and doesnt go away on its own, you may need surgery. During surgery, the surgeon cuts the transverse carpal ligament to relieve pressure on the median nerve.     When to call your healthcare provider  Call your healthcare provider right away if you have any of these:  · Fever of 100.4°F (38°C) or higher, or as directed  · Symptoms that dont get better, or get worse  · New symptoms   Date Last Reviewed: 3/10/2016  © 2812-2853 [employer]. [address] [place] [address], [place] [address]. All rights reserved. This information is not intended as a substitute for professional medical care. Always follow your healthcare professional's instructions.        Carpal Tunnel Syndrome    Carpal tunnel syndrome is a painful condition of the wrist and arm. It is caused by pressure on the median nerve.  The median nerve is one of the nerves that give feeling and movement to the hand. It passes through a tunnel in the wrist called the carpal tunnel. This tunnel is made up of bones and ligaments. Narrowing of this tunnel or swelling of the tissues inside the tunnel puts pressure on the median nerve. This causes numbness, pins and needles, or electric shooting pains in your hand and forearm. Often the pain is worse at night and may wake you when you are asleep.  Carpal tunnel syndrome may occur during pregnancy and with use of birth control pills. It is more common in workers who must often bend their wrists. It is also common in people who work with power tools that cause strong vibrations.  Home care  · Rest the painful wrist. Avoid repeated bending of the wrist back and forth. This puts pressure on the median nerve. Avoid using power tools with  strong vibrations.  · If you were given a splint, wear it at night while you sleep. You may also wear it during the day for comfort.  · Move your fingers and wrists often to avoid stiffness.  · Elevate your arms on pillows when you lie down.  · Try using the unaffected hand more.  · Try not to hold your wrists in a bent, downward position.  · Sometimes changes in the work place may ease symptoms. If you type most of the day, it may help to change the position of your keyboard or add a wrist support. Your wrist should be in a neutral position and not bent back when typing.  · You may use over-the-counter pain medicine to treat pain and inflammation, unless another medicine was prescribed. Anti-inflammatory pain medicines, such as ibuprofen or naproxen may be more effective than acetaminophen, which treats pain, but not inflammation. If you have chronic liver or kidney disease or ever had a stomach ulcer or GI bleeding, talk with your doctor before using these medicines.  · Opioid pain medicine will only give temporary relief and does not treat the problem. If pain continues, you may need a shot of a steroid drug into your wrist.  · If the above methods fail, you may need surgery. This will open the carpal tunnel and release the pressure on the trapped nerve.  Follow-up care  Follow up with your healthcare provider, or as advised, if the pain doesnt begin to improve within the next week.  If X-rays were taken, you will be notified of any new findings that may affect your care.  When to seek medical advice  Call your healthcare provider right away if any of these occur:  · Pain not improving with the above treatment  · Fingers or hand become cold, blue, numb, or tingly  · Your whole arm becomes swollen or weak  Date Last Reviewed: 11/23/2015  © 8464-2735 [employer]. [address] [place] [address], [place] [address]. All rights reserved. This information is not intended as a substitute for professional medical  care. Always follow your healthcare professional's instructions.

## 2020-07-29 NOTE — PROGRESS NOTES
[facility]  Physical Therapy Initial Evaluation    Name: Indio Ryder Jr.  Clinic Number: [id number]    Therapy Diagnosis: decreased cervical and L shoulder ROM and pain with LUE weakness  Physician: Lorena Thakur MD    Physician Orders: PT Eval and Treat   Medical Diagnosis from Referral:      M54.12 (ICD-10-CM) - Cervical radiculopathy       Evaluation Date: 7/30/2020  Authorization Period Expiration: 12/31/20  Plan of Care Expiration: 9/31/20  Visit # / Visits authorized: 1/ 1    Time In: 1015 am  Time Out: 1100 am  Total Billable Time: 45 minutes low complexity eval; MT 1    Precautions: covid -19 infection status    Subjective   Date of onset: about 5 years ago or more  History of current condition - Indio reports: per 7/28/20 visit with Dr. Thakur:   Indio Ryder Jr. is a 50 y.o. male.     Chief Complaint: Neck Pain and Shoulder Pain    Neck Pain   Pertinent negatives include no chest pain or headaches.   Shoulder Pain   Pertinent negatives include no headaches.      C/o severe pain L neck extending into L upper arm.    Began suddenly.  Can't describe the pain.  Heat may help.  Not tingly, numb.  Same pain years ago was attributed to OA.  He has tried tylenol and muscle relaxer.  Feels different than rotator cuff pain.  He is already taking gabapentin.  Fatigue limits increasing dose.     EMG in January:  1. A severe and primarily axonal length dependant sensorimotor polyneuropathy in the lower and upper extremities consistent with the patients history of diabetes;   2. Based upon the abnormalities in bilateral transcarpal comparison evaluations, there is likely bilateral carpal tunnel syndrome, but the diabetic polyneuropathy makes for a definitive diagnosis. There is evidence of denervation in bilateral APB muscles, but these findings are potentially explained by lower cervical radiculopathies;   3. Acute on chronic denervation in the right C5 and/or C6 myotomes  and in bilateral C8 and/or T1 myotomes suggestive of radiculopathies at those levels. Severe diabetic polyneuropathy can also cause active denervation (positive waves and fibrillations) primarily in distal muscles. Imaging of the cervical spine with MRI is recommended.      Covid infection in March.     Continues to itch.  Zyrtec helped a little.    He has upcoming dermatology appt.     Syncopal episode 3 times assoc with hypotension in Feb.  Feels lightheaded when in the sun, and presyncopal.              Medical History:   Past Medical History:   Diagnosis Date    Actinomyces infection 1/17/2017    Right foot    Diabetic ketoacidosis without coma associated with type 2 diabetes mellitus 5/30/2017    Diabetic ulcer of right foot associated with type 2 diabetes mellitus 6/3/2015    Essential hypertension 6/6/2013    Group B streptococcal infection 1/13/2017    Mixed hyperlipidemia 8/12/2014    Shoulder impingement 8/12/2014    Subacute osteomyelitis of right foot 1/12/2017    Streptococcus agalactiae, Actinomyces odontolyticus    Traumatic amputation of fifth toe of right foot 07/02/2015    Type 2 diabetes mellitus with diabetic neuropathy, with long-term current use of insulin 5/3/2016    Ulcerative colitis, unspecified        Surgical History:   Indio ALVARADO Britni Toure.  has a past surgical history that includes Toe amputation (Right, 06/05/2015); Toe amputation (Right, 01/19/2017); Debridement of foot (Left, 7/14/2019); and Debridement of foot (Left, 7/17/2019).    Medications:   Indio has a current medication list which includes the following prescription(s): atorvastatin, gabapentin, insulin aspart u-100, insulin detemir u-100, liraglutide 0.6 mg/0.1 ml (18 mg/3 ml) subq pnij, metformin, onetouch delica lancets, onetouch ultra2 meter, pen needle, diabetic, and pen needle, diabetic.    Allergies:   Review of patient's allergies indicates:  No Known Allergies     Imaging, MRI studies:   /31/2020 08:29    MRI Cervical Spine Without Contrast  Order: [id number]  Status:  Final result   Visible to patient:  No (not released)   Next appt:  08/07/2020 at 04:00 PM in Dermatology (Riley Thompson MD)   Dx:  Radiculopathy, cervical region  Details    Reading Physician Reading Date Result Priority   Charles Hickey MD  [phone] 7/31/2020 Routine      Narrative & Impression     EXAMINATION:  MRI CERVICAL SPINE WITHOUT CONTRAST     CLINICAL HISTORY:  Cervical radiculopathy; Radiculopathy, cervical region     TECHNIQUE:  Multiplanar, multisequence MR images of the cervical spine were performed without the administration of contrast.     COMPARISON:  None.     FINDINGS:  Alignment: Normal.     Vertebrae: Normal marrow signal. No fracture.     Discs: Normal height and signal.     Cord: Normal.     Skull base and craniocervical junction: Normal.     Degenerative findings:     C2-C3: Unremarkable     C3-C4: Disc osteophyte complex and right greater than left uncovertebral hypertrophy.  Mild canal stenosis and severe right neural foraminal narrowing.     C4-C5: Disc osteophyte complex and left greater than right disc osteophyte complex.  There is moderate canal stenosis, particularly in the left aspect of the canal with compression of the cord and severe left neural foraminal narrowing.     C5-C6: Disc osteophyte complex and bilateral uncovertebral hypertrophy slightly worse on the left.  There is severe bilateral neural foraminal narrowing and mild-moderate canal stenosis.     C6-C7: Disc osteophyte complex and bilateral uncovertebral hypertrophy.  Severe in canal stenosis, severe bilateral neural foraminal narrowing.     C7-T1: Mild uncovertebral hypertrophy and small disc osteophyte complex.  No significant nerve root compression.     T1-T2: No nerve root compression.     Paraspinal muscles & soft tissues: Unremarkable.     Impression:     Multilevel degenerative change with compressive sequelae as detailed above.  There  is multilevel significant neural foraminal narrowing and canal stenosis.  However, no definite cord signal abnormality.        Electronically signed by: Charles Hickey MD  Date:                                            2020  Time:                                           08:01               Prior Therapy: yes  Social History:  lives alone  Occupation: baker  Prior Level of Function: community level ambulation and independent  Current Level of Function: same but with pain and difficulty especially lifting activities    Pain:  Current 5/10, worst 9/10, best 2/10   Location: left neck  and shoulder   Description: Aching and Tight  Aggravating Factors: when using the arm  Easing Factors: relaxation, pain medication and heating pad    Pts goals: be able get stiffness and pain to go away with everyday activity.    Objective     Observation:/Posture Head forward, Affected scapula depressed, Increased kyphosis and severe muscle wasting at L deltoid circumferentially      Cervical ROM: (measured in degrees)    Degrees Quality   Flexion WFL    extension 30     Right rotation 55    Left rotation 58      Shoulder Active/ Passive ROM: (measured in degrees)   Shoulder Right UE Left UE   Flexion  WFLs limited as follows: 90/150   Abduction WFLs limited as follows: 70/90   ER limited as follows: 60/65 limited as follows: 30/40   IR WFLs WFLs     Sensation: Dermatomes: Impaired: able to denote light touch and proprioception grossly but unable to determine exact location distal to wrist and around shoulder    Reflexes: NT due to pain and guarding    Strength: (measured in neutral spine, gradin-5 out of 5)   Cervical MMT   Flexion 4+/5   Extension 4+/5   Upper trap. 4/5     Upper Extremity Strength: (grading 1-5 out of 5) LUE 4 to 4-/5 in midrange but unable to complete available ROM against gravity so mostly 3-/5     Right UE Left UE   Shoulder Flexion: 4+/5 3-/5   Shoulder Abduction: 4+/5 3-/5   Shoulder ER: 4/5 3-/5    Shoulder IR: 4+/5 4/5        Elbow Flexion: 4+/5 4/5   Elbow Extension: 4+/5 4/5          Cervical Spine Special Tests: ((+): positive; (-): negative)   Compression neg   Distraction neg   Warren test/TOS NT   Lateral Flexion Alar Ligament intact   First Rib  Speeds  Empty can  Drop arm test Neg  Neg  Neg  pos          CMS Impairment/Limitation/Restriction for FOTO shoulder Survey    Therapist reviewed FOTO scores for Indio Ryder Jr. on 2020.   FOTO documents entered into [employer] - see Media section.    Limitation Score: 45%  Category: physical, functional and occupational limitations    Current : CK = at least 40% but < 60% impaired, limited or restricted  Goal: CJ = at least 20% but < 40% impaired, limited or restricted  Discharge:          TREATMENT   Treatment Time In: 1015 am  Treatment Time Out: 1100 am  Total Treatment time separate from Evaluation: 20 minutes    Indio received therapeutic exercises to develop strength, endurance and ROM for 10 minutes includin set demo and return demo and review with just demo.  Shoulder extension YTB 3 x 10  Shoulder row OTB 3 x 10  ER YTB 3 x 10  Flexion in supine and progress towards upright against wall 3 x 10  Abduction in supine and progress towards upright against wall 3 x 10    Scapular stabilization supine at 90 deg and then 120 flex 3 x 10 CW and CCW circles progress towards upright    Indio received the following manual therapy techniques: Manual traction, Myofacial release and Soft tissue Mobilization were applied to the: levator. Suboccipital muscles, rhomboids, levator, scalenes and upper traps, supraspinatus for 10 minutes, including:  Cervical traction    Home Exercises and Patient Education Provided    Education provided:  HEP, pain management    Written Home Exercises Provided: Patient instructed to cont prior HEP.  Exercises were reviewed and Indio was able to demonstrate them prior to the end of the session.  Indio demonstrated good   understanding of the education provided.     See EMR under Patient Instructions for exercises provided prior visit.    Assessment   Indio is a 50 y.o. male referred to outpatient Physical Therapy with a medical diagnosis of cervical and L shoulder pain. Pt presents with severe muscle wasting on L shoulder and compensation with elevation of L shoulder into flexion and abduction with severe difficulty initiating abduction. Pt was negative on rotator cuff test but will await MRI to likely determine extent of possible rotator cuff injury and likely false negative on some of the tests.  Pt was positive for drop arm test.     Pt prognosis is Fair.   Pt will benefit from skilled outpatient Physical Therapy to address the deficits stated above and in the chart below, provide pt/family education, and to maximize pt's level of independence.     Plan of care discussed with patient: Yes  Pt's spiritual, cultural and educational needs considered and patient is agreeable to the plan of care and goals as stated below:     Anticipated Barriers for therapy: to be determined following MRI,     Medical Necessity is demonstrated by the following  History  Co-morbidities and personal factors that may impact the plan of care Co-morbidities:   CAD, DKA, DM, hyperlipidemia, polyneuropathy, HTN    Personal Factors:   age     moderate   Examination  Body Structures and Functions, activity limitations and participation restrictions that may impact the plan of care Body Regions:   neck  lower extremities  upper extremities    Body Systems:    gross symmetry  ROM  strength  gross coordinated movement    Participation Restrictions:   none    Activity limitations:   Learning and applying knowledge  no deficits    General Tasks and Commands  no deficits    Communication  no deficits    Mobility  lifting and carrying objects    Self care  caring for body parts (brushing teeth, shaving, grooming)    Domestic Life  doing house work (cleaning house,  washing dishes, laundry)    Interactions/Relationships  no deficits    Life Areas  employment    Community and Social Life  recreation and leisure         low   Clinical Presentation stable and uncomplicated low   Decision Making/ Complexity Score: low     Goals:  Short Term Goals: 3 weeks   1. Pt to be Independent with HEP for increased strength, endurance and functional mobility.  2. Pt to have decreased pain 3/10 for increased functional mobility and tolerance.  3. Pt to increase AROM to 60 cervical rotation bilaterally in degrees for increased functional mobility.      Long Term Goals: 8 weeks   1. Pt to be Independent with pain management strategies and verbalize 2 for increased strength, endurance and functional mobility.  2. Pt to have decreased pain 2/10 for increased functional mobility and tolerance.  3. Pt to increase AROM to 150 in abduction and 170 in flexion degrees on L shoulder for increased functional mobility.  4. 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  5. Pt increase L shoulder strength in abd and flexion to 4/5 for increased functional activity.    Plan   Plan of care Certification: 7/30/2020 to 9/31/20.    Outpatient Physical Therapy 2 times weekly for 8 weeks to include the following interventions: Cervical/Lumbar Traction, Manual Therapy, Moist Heat/ Ice, Neuromuscular Re-ed, Patient Education and Therapeutic Exercise.     Kirsty Doe, PT

## 2020-07-30 ENCOUNTER — HOSPITAL ENCOUNTER (OUTPATIENT)
Dept: RADIOLOGY | Facility: OTHER | Age: 50
Discharge: HOME | End: 2020-07-30
Attending: INTERNAL MEDICINE
Payer: MEDICAID

## 2020-07-30 ENCOUNTER — CLINICAL SUPPORT (OUTPATIENT)
Dept: REHABILITATION | Facility: HOSPITAL | Age: 50
End: 2020-07-30
Attending: INTERNAL MEDICINE
Payer: MEDICAID

## 2020-07-30 DIAGNOSIS — R29.898 WEAKNESS OF LEFT UPPER EXTREMITY: ICD-10-CM

## 2020-07-30 DIAGNOSIS — M54.12 CERVICAL RADICULOPATHY: ICD-10-CM

## 2020-07-30 DIAGNOSIS — M25.612 DECREASED RANGE OF MOTION OF SHOULDER, LEFT: ICD-10-CM

## 2020-07-30 DIAGNOSIS — M54.2 CERVICAL PAIN: Primary | ICD-10-CM

## 2020-07-30 DIAGNOSIS — M54.12 RADICULOPATHY, CERVICAL REGION: ICD-10-CM

## 2020-07-30 DIAGNOSIS — G89.29 CHRONIC LEFT SHOULDER PAIN: ICD-10-CM

## 2020-07-30 DIAGNOSIS — M25.512 CHRONIC LEFT SHOULDER PAIN: ICD-10-CM

## 2020-07-30 PROCEDURE — 72141 MRI CERVICAL SPINE WITHOUT CONTRAST: ICD-10-PCS | Mod: 26,,, | Performed by: RADIOLOGY

## 2020-07-30 PROCEDURE — 97140 MANUAL THERAPY 1/> REGIONS: CPT | Mod: PO

## 2020-07-30 PROCEDURE — 72141 MRI NECK SPINE W/O DYE: CPT | Mod: TC

## 2020-07-30 PROCEDURE — 97161 PT EVAL LOW COMPLEX 20 MIN: CPT | Mod: PO

## 2020-07-30 PROCEDURE — 72141 MRI NECK SPINE W/O DYE: CPT | Mod: 26,,, | Performed by: RADIOLOGY

## 2020-07-31 NOTE — PLAN OF CARE
[facility]  Physical Therapy Initial Evaluation    Name: Indio Ryder Jr.  Clinic Number: [id number]    Therapy Diagnosis: decreased cervical and L shoulder ROM and pain with LUE weakness  Physician: Lorena Thakur MD    Physician Orders: PT Eval and Treat   Medical Diagnosis from Referral:      M54.12 (ICD-10-CM) - Cervical radiculopathy       Evaluation Date: 7/30/2020  Authorization Period Expiration: 12/31/20  Plan of Care Expiration: 9/31/20  Visit # / Visits authorized: 1/ 1    Time In: 1015 am  Time Out: 1100 am  Total Billable Time: 45 minutes low complexity eval; MT 1    Precautions: covid -19 infection status    Subjective   Date of onset: about 5 years ago or more  History of current condition - Indio reports: per 7/28/20 visit with Dr. Thakur:   Indio Ryder Jr. is a 50 y.o. male.     Chief Complaint: Neck Pain and Shoulder Pain    Neck Pain   Pertinent negatives include no chest pain or headaches.   Shoulder Pain   Pertinent negatives include no headaches.      C/o severe pain L neck extending into L upper arm.    Began suddenly.  Can't describe the pain.  Heat may help.  Not tingly, numb.  Same pain years ago was attributed to OA.  He has tried tylenol and muscle relaxer.  Feels different than rotator cuff pain.  He is already taking gabapentin.  Fatigue limits increasing dose.     EMG in January:  1. A severe and primarily axonal length dependant sensorimotor polyneuropathy in the lower and upper extremities consistent with the patients history of diabetes;   2. Based upon the abnormalities in bilateral transcarpal comparison evaluations, there is likely bilateral carpal tunnel syndrome, but the diabetic polyneuropathy makes for a definitive diagnosis. There is evidence of denervation in bilateral APB muscles, but these findings are potentially explained by lower cervical radiculopathies;   3. Acute on chronic denervation in the right C5 and/or C6 myotomes  and in bilateral C8 and/or T1 myotomes suggestive of radiculopathies at those levels. Severe diabetic polyneuropathy can also cause active denervation (positive waves and fibrillations) primarily in distal muscles. Imaging of the cervical spine with MRI is recommended.      Covid infection in March.     Continues to itch.  Zyrtec helped a little.    He has upcoming dermatology appt.     Syncopal episode 3 times assoc with hypotension in Feb.  Feels lightheaded when in the sun, and presyncopal.              Medical History:   Past Medical History:   Diagnosis Date    Actinomyces infection 1/17/2017    Right foot    Diabetic ketoacidosis without coma associated with type 2 diabetes mellitus 5/30/2017    Diabetic ulcer of right foot associated with type 2 diabetes mellitus 6/3/2015    Essential hypertension 6/6/2013    Group B streptococcal infection 1/13/2017    Mixed hyperlipidemia 8/12/2014    Shoulder impingement 8/12/2014    Subacute osteomyelitis of right foot 1/12/2017    Streptococcus agalactiae, Actinomyces odontolyticus    Traumatic amputation of fifth toe of right foot 07/02/2015    Type 2 diabetes mellitus with diabetic neuropathy, with long-term current use of insulin 5/3/2016    Ulcerative colitis, unspecified        Surgical History:   Indio ALVARADO Britni Toure.  has a past surgical history that includes Toe amputation (Right, 06/05/2015); Toe amputation (Right, 01/19/2017); Debridement of foot (Left, 7/14/2019); and Debridement of foot (Left, 7/17/2019).    Medications:   Indio has a current medication list which includes the following prescription(s): atorvastatin, gabapentin, insulin aspart u-100, insulin detemir u-100, liraglutide 0.6 mg/0.1 ml (18 mg/3 ml) subq pnij, metformin, onetouch delica lancets, onetouch ultra2 meter, pen needle, diabetic, and pen needle, diabetic.    Allergies:   Review of patient's allergies indicates:  No Known Allergies     Imaging, MRI studies:   /31/2020 08:29    MRI Cervical Spine Without Contrast  Order: [id number]  Status:  Final result   Visible to patient:  No (not released)   Next appt:  08/07/2020 at 04:00 PM in Dermatology (Riley Thompson MD)   Dx:  Radiculopathy, cervical region  Details    Reading Physician Reading Date Result Priority   Charles Hickey MD  [phone] 7/31/2020 Routine      Narrative & Impression     EXAMINATION:  MRI CERVICAL SPINE WITHOUT CONTRAST     CLINICAL HISTORY:  Cervical radiculopathy; Radiculopathy, cervical region     TECHNIQUE:  Multiplanar, multisequence MR images of the cervical spine were performed without the administration of contrast.     COMPARISON:  None.     FINDINGS:  Alignment: Normal.     Vertebrae: Normal marrow signal. No fracture.     Discs: Normal height and signal.     Cord: Normal.     Skull base and craniocervical junction: Normal.     Degenerative findings:     C2-C3: Unremarkable     C3-C4: Disc osteophyte complex and right greater than left uncovertebral hypertrophy.  Mild canal stenosis and severe right neural foraminal narrowing.     C4-C5: Disc osteophyte complex and left greater than right disc osteophyte complex.  There is moderate canal stenosis, particularly in the left aspect of the canal with compression of the cord and severe left neural foraminal narrowing.     C5-C6: Disc osteophyte complex and bilateral uncovertebral hypertrophy slightly worse on the left.  There is severe bilateral neural foraminal narrowing and mild-moderate canal stenosis.     C6-C7: Disc osteophyte complex and bilateral uncovertebral hypertrophy.  Severe in canal stenosis, severe bilateral neural foraminal narrowing.     C7-T1: Mild uncovertebral hypertrophy and small disc osteophyte complex.  No significant nerve root compression.     T1-T2: No nerve root compression.     Paraspinal muscles & soft tissues: Unremarkable.     Impression:     Multilevel degenerative change with compressive sequelae as detailed above.  There  is multilevel significant neural foraminal narrowing and canal stenosis.  However, no definite cord signal abnormality.        Electronically signed by: Charles Hickey MD  Date:                                            2020  Time:                                           08:01               Prior Therapy: yes  Social History:  lives alone  Occupation: baker  Prior Level of Function: community level ambulation and independent  Current Level of Function: same but with pain and difficulty especially lifting activities    Pain:  Current 5/10, worst 9/10, best 2/10   Location: left neck  and shoulder   Description: Aching and Tight  Aggravating Factors: when using the arm  Easing Factors: relaxation, pain medication and heating pad    Pts goals: be able get stiffness and pain to go away with everyday activity.    Objective     Observation:/Posture Head forward, Affected scapula depressed, Increased kyphosis and severe muscle wasting at L deltoid circumferentially      Cervical ROM: (measured in degrees)    Degrees Quality   Flexion WFL    extension 30     Right rotation 55    Left rotation 58      Shoulder Active/ Passive ROM: (measured in degrees)   Shoulder Right UE Left UE   Flexion  WFLs limited as follows: 90/150   Abduction WFLs limited as follows: 70/90   ER limited as follows: 60/65 limited as follows: 30/40   IR WFLs WFLs     Sensation: Dermatomes: Impaired: able to denote light touch and proprioception grossly but unable to determine exact location distal to wrist and around shoulder    Reflexes: NT due to pain and guarding    Strength: (measured in neutral spine, gradin-5 out of 5)   Cervical MMT   Flexion 4+/5   Extension 4+/5   Upper trap. 4/5     Upper Extremity Strength: (grading 1-5 out of 5) LUE 4 to 4-/5 in midrange but unable to complete available ROM against gravity so mostly 3-/5     Right UE Left UE   Shoulder Flexion: 4+/5 3-/5   Shoulder Abduction: 4+/5 3-/5   Shoulder ER: 4/5 3-/5    Shoulder IR: 4+/5 4/5        Elbow Flexion: 4+/5 4/5   Elbow Extension: 4+/5 4/5          Cervical Spine Special Tests: ((+): positive; (-): negative)   Compression neg   Distraction neg   Warren test/TOS NT   Lateral Flexion Alar Ligament intact   First Rib  Speeds  Empty can  Drop arm test Neg  Neg  Neg  pos          CMS Impairment/Limitation/Restriction for FOTO shoulder Survey    Therapist reviewed FOTO scores for Indio Ryder Jr. on 2020.   FOTO documents entered into [employer] - see Media section.    Limitation Score: 45%  Category: physical, functional and occupational limitations    Current : CK = at least 40% but < 60% impaired, limited or restricted  Goal: CJ = at least 20% but < 40% impaired, limited or restricted  Discharge:          TREATMENT   Treatment Time In: 1015 am  Treatment Time Out: 1100 am  Total Treatment time separate from Evaluation: 20 minutes    Indio received therapeutic exercises to develop strength, endurance and ROM for 10 minutes includin set demo and return demo and review with just demo.  Shoulder extension YTB 3 x 10  Shoulder row OTB 3 x 10  ER YTB 3 x 10  Flexion in supine and progress towards upright against wall 3 x 10  Abduction in supine and progress towards upright against wall 3 x 10    Scapular stabilization supine at 90 deg and then 120 flex 3 x 10 CW and CCW circles progress towards upright    Indio received the following manual therapy techniques: Manual traction, Myofacial release and Soft tissue Mobilization were applied to the: levator. Suboccipital muscles, rhomboids, levator, scalenes and upper traps, supraspinatus for 10 minutes, including:  Cervical traction    Home Exercises and Patient Education Provided    Education provided:  HEP, pain management    Written Home Exercises Provided: Patient instructed to cont prior HEP.  Exercises were reviewed and Indio was able to demonstrate them prior to the end of the session.  Indio demonstrated good   understanding of the education provided.     See EMR under Patient Instructions for exercises provided prior visit.    Assessment   Indio is a 50 y.o. male referred to outpatient Physical Therapy with a medical diagnosis of cervical and L shoulder pain. Pt presents with severe muscle wasting on L shoulder and compensation with elevation of L shoulder into flexion and abduction with severe difficulty initiating abduction. Pt was negative on rotator cuff test but will await MRI to likely determine extent of possible rotator cuff injury and likely false negative on some of the tests.  Pt was positive for drop arm test.     Pt prognosis is Fair.   Pt will benefit from skilled outpatient Physical Therapy to address the deficits stated above and in the chart below, provide pt/family education, and to maximize pt's level of independence.     Plan of care discussed with patient: Yes  Pt's spiritual, cultural and educational needs considered and patient is agreeable to the plan of care and goals as stated below:     Anticipated Barriers for therapy: to be determined following MRI,     Medical Necessity is demonstrated by the following  History  Co-morbidities and personal factors that may impact the plan of care Co-morbidities:   CAD, DKA, DM, hyperlipidemia, polyneuropathy, HTN    Personal Factors:   age     moderate   Examination  Body Structures and Functions, activity limitations and participation restrictions that may impact the plan of care Body Regions:   neck  lower extremities  upper extremities    Body Systems:    gross symmetry  ROM  strength  gross coordinated movement    Participation Restrictions:   none    Activity limitations:   Learning and applying knowledge  no deficits    General Tasks and Commands  no deficits    Communication  no deficits    Mobility  lifting and carrying objects    Self care  caring for body parts (brushing teeth, shaving, grooming)    Domestic Life  doing house work (cleaning house,  washing dishes, laundry)    Interactions/Relationships  no deficits    Life Areas  employment    Community and Social Life  recreation and leisure         low   Clinical Presentation stable and uncomplicated low   Decision Making/ Complexity Score: low     Goals:  Short Term Goals: 3 weeks   1. Pt to be Independent with HEP for increased strength, endurance and functional mobility.  2. Pt to have decreased pain 3/10 for increased functional mobility and tolerance.  3. Pt to increase AROM to 60 cervical rotation bilaterally in degrees for increased functional mobility.      Long Term Goals: 8 weeks   1. Pt to be Independent with pain management strategies and verbalize 2 for increased strength, endurance and functional mobility.  2. Pt to have decreased pain 2/10 for increased functional mobility and tolerance.  3. Pt to increase AROM to 150 in abduction and 170 in flexion degrees on L shoulder for increased functional mobility.  4. 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  5. Pt increase L shoulder strength in abd and flexion to 4/5 for increased functional activity.    Plan   Plan of care Certification: 7/30/2020 to 9/31/20.    Outpatient Physical Therapy 2 times weekly for 8 weeks to include the following interventions: Cervical/Lumbar Traction, Manual Therapy, Moist Heat/ Ice, Neuromuscular Re-ed, Patient Education and Therapeutic Exercise.     Kirsty Doe, PT

## 2020-08-03 ENCOUNTER — TELEPHONE (OUTPATIENT)
Dept: INTERNAL MEDICINE | Facility: CLINIC | Age: 50
End: 2020-08-03

## 2020-08-03 NOTE — TELEPHONE ENCOUNTER
----- Message from Lorena Thakur MD sent at 8/2/2020  6:54 PM CDT -----  Pls inform pt:    Mr Elphage - MRI of neck shows arthritic changes, severe in places..  I'd like to see you back in about 1 month to make sure you are feeling better.  NE

## 2020-08-06 ENCOUNTER — OFFICE VISIT (OUTPATIENT)
Dept: UROLOGY | Facility: CLINIC | Age: 50
End: 2020-08-06
Payer: MEDICAID

## 2020-08-06 ENCOUNTER — TELEPHONE (OUTPATIENT)
Dept: UROLOGY | Facility: CLINIC | Age: 50
End: 2020-08-06

## 2020-08-06 VITALS
BODY MASS INDEX: 28.75 KG/M2 | HEIGHT: 73 IN | HEART RATE: 92 BPM | SYSTOLIC BLOOD PRESSURE: 144 MMHG | WEIGHT: 216.94 LBS | DIASTOLIC BLOOD PRESSURE: 83 MMHG

## 2020-08-06 DIAGNOSIS — N52.9 ERECTILE DYSFUNCTION, UNSPECIFIED ERECTILE DYSFUNCTION TYPE: Primary | ICD-10-CM

## 2020-08-06 DIAGNOSIS — Z12.5 ENCOUNTER FOR PROSTATE CANCER SCREENING: ICD-10-CM

## 2020-08-06 DIAGNOSIS — Z12.5 ENCOUNTER FOR PROSTATE CANCER SCREENING: Primary | ICD-10-CM

## 2020-08-06 LAB
BILIRUB SERPL-MCNC: ABNORMAL MG/DL
BLOOD URINE, POC: ABNORMAL
CLARITY, POC UA: CLEAR
COLOR, POC UA: YELLOW
GLUCOSE UR QL STRIP: 500
KETONES UR QL STRIP: ABNORMAL
LEUKOCYTE ESTERASE URINE, POC: ABNORMAL
NITRITE, POC UA: ABNORMAL
PH, POC UA: 5
POC RESIDUAL URINE VOLUME: 56 ML (ref 0–100)
PROTEIN, POC: ABNORMAL
SPECIFIC GRAVITY, POC UA: 1.02
UROBILINOGEN, POC UA: NORMAL

## 2020-08-06 PROCEDURE — 99204 OFFICE O/P NEW MOD 45 MIN: CPT | Mod: S$PBB,,, | Performed by: NURSE PRACTITIONER

## 2020-08-06 PROCEDURE — 99204 PR OFFICE/OUTPT VISIT, NEW, LEVL IV, 45-59 MIN: ICD-10-PCS | Mod: S$PBB,,, | Performed by: NURSE PRACTITIONER

## 2020-08-06 PROCEDURE — 99999 PR PBB SHADOW E&M-EST. PATIENT-LVL IV: CPT | Mod: PBBFAC,,, | Performed by: NURSE PRACTITIONER

## 2020-08-06 PROCEDURE — 81002 URINALYSIS NONAUTO W/O SCOPE: CPT | Mod: PBBFAC,PN | Performed by: NURSE PRACTITIONER

## 2020-08-06 PROCEDURE — 99999 PR PBB SHADOW E&M-EST. PATIENT-LVL IV: ICD-10-PCS | Mod: PBBFAC,,, | Performed by: NURSE PRACTITIONER

## 2020-08-06 PROCEDURE — 51798 US URINE CAPACITY MEASURE: CPT | Mod: PBBFAC,PN | Performed by: NURSE PRACTITIONER

## 2020-08-06 PROCEDURE — 99214 OFFICE O/P EST MOD 30 MIN: CPT | Mod: PBBFAC,PN | Performed by: NURSE PRACTITIONER

## 2020-08-06 RX ORDER — TADALAFIL 20 MG/1
20 TABLET ORAL DAILY PRN
Qty: 12 TABLET | Refills: 11 | Status: SHIPPED | OUTPATIENT
Start: 2020-08-06 | End: 2020-08-06 | Stop reason: SDUPTHER

## 2020-08-06 RX ORDER — TADALAFIL 20 MG/1
20 TABLET ORAL
Qty: 12 TABLET | Refills: 11 | Status: ON HOLD | OUTPATIENT
Start: 2020-08-06 | End: 2021-05-01 | Stop reason: HOSPADM

## 2020-08-06 NOTE — PROGRESS NOTES
"Subjective:      Indio Ryder Jr. is a 50 y.o. male who was referred self referred for erectile dysfunction       Erectile Dysfunction  Patient complains of erectile dysfunction. Onset of dysfunction was several months ago and was unknown in onset.  Patient states the nature of difficulty is both attaining and maintaining erection. Full erections occur never. Partial erections occur with intercourse and with masturbation. Libido is not affected. Risk factors for ED include diabetes mellitus and antihypertensive medications. Pt was taking HTN medication for kidney preservation due to his DM; however this caused hypotension and the medication was discontinued.   Patient denies history of cranial, spinal, or pelvic trauma, pelvic radiation and hypogonadism. Currently has girlfriend. Previous treatment of ED includes viagra without success. He denies cardiac history, not taking nitroglycerin.   Of, note pt was hospitalized in March for diabetic foot ulcer. He "caught COVID" while hospitalized. He was on ventilator for 10+ days. ED was mild with occasional difficulty maintaining erections prior to this event.  ED has drastically worsened since.   Per chart review,  he was last seen by Urology in 2016. PSA collected today is pending     The following portions of the patient's history were reviewed and updated as appropriate: allergies, current medications, past family history, past medical history, past social history, past surgical history and problem list.    Review of Systems  Constitutional: no fever or chills  ENT: no nasal congestion or sore throat  Respiratory: no cough or shortness of breath  Cardiovascular: no chest pain or palpitations  Gastrointestinal: no nausea or vomiting, tolerating diet  Genitourinary: as per HPI  Hematologic/Lymphatic: no easy bruising or lymphadenopathy  Musculoskeletal: no arthralgias or myalgias  Neurological: no seizures or tremors  Behavioral/Psych: no auditory or visual " "hallucinations     Objective:   Vitals: BP (!) 144/83   Pulse 92   Ht 6' 1" (1.854 m)   Wt 98.4 kg (216 lb 14.9 oz)   BMI 28.62 kg/m²     Physical Exam   General: alert and oriented, no acute distress  Head: normocephalic, atraumatic  Neck: supple, no lymphadenopathy, normal ROM, no masses  Respiratory: Symmetric expansion, non-labored breathing  Cardiovascular: regular rate and rhythm, nomal pulses, no peripheral edema  Abdomen: soft, non tender, non distended, no palpable masses, no hernias, no hepatomegaly or splenomegaly  Genitourinary: defer  Lymphatic: no inguinal nodes  Skin: normal coloration and turgor, no rashes, no suspicious skin lesions noted  Neuro: alert and oriented x3, no gross deficits  Psych: normal judgment and insight, normal mood/affect and non-anxious    Physical Exam    Lab Review   Urinalysis demonstrates glucose (500), protein (trace)  Lab Results   Component Value Date    WBC 5.95 04/21/2020    HGB 10.2 (L) 04/21/2020    HCT 34.1 (L) 04/21/2020    HCT 44 05/30/2017    MCV 95 04/21/2020     04/21/2020     Lab Results   Component Value Date    CREATININE 1.0 06/12/2020    BUN 14 06/12/2020     Lab Results   Component Value Date    PSA 0.65 08/11/2014   .  Lab Results   Component Value Date    PSA 0.65 08/11/2014    PSA 0.59 06/07/2013    PSA 0.72 03/01/2012    PSADIAG 0.50 08/06/2020    PSADIAG 1.8 04/13/2016       Imaging    Bladder Scan PVR: 56cc     Assessment:     1. Erectile dysfunction, unspecified erectile dysfunction type    2. Encounter for prostate cancer screening        Plan:     Orders Placed This Encounter    POCT Bladder Scan    POCT URINE DIPSTICK WITHOUT MICROSCOPE    tadalafiL (CIALIS) 20 MG Tab       --Trial of Cialis   --PSA pending  --RTC in 1 month for medication assessment and JOLENE    "

## 2020-08-06 NOTE — PATIENT INSTRUCTIONS
Tadalafil tablets (Cialis)  What is this medicine?  TADALAFIL (christopher DA la neva) is used to treat erection problems in men. It is also used for enlargement of the prostate gland in men, a condition called benign prostatic hyperplasia or BPH. This medicine improves urine flow and reduces BPH symptoms. This medicine can also treat both erection problems and BPH when they occur together.  How should I use this medicine?  Take this medicine by mouth with a glass of water. Follow the directions on the prescription label. You may take this medicine with or without meals. When this medicine is used for erection problems, your doctor may prescribe it to be taken once daily or as needed. If you are taking the medicine as needed, you may be able to have sexual activity 30 minutes after taking it and for up to 36 hours after taking it. Whether you are taking the medicine as needed or once daily, you should not take more than one dose per day. If you are taking this medicine for symptoms of benign prostatic hyperplasia (BPH) or to treat both BPH and an erection problem, take the dose once daily at about the same time each day. Do not take your medicine more often than directed.  Talk to your pediatrician regarding the use of this medicine in children. Special care may be needed.  What side effects may I notice from receiving this medicine?  Side effects that you should report to your doctor or health care professional as soon as possible:  · allergic reactions like skin rash, itching or hives, swelling of the face, lips, or tongue  · breathing problems  · changes in hearing  · changes in vision  · chest pain  · fast, irregular heartbeat  · prolonged or painful erection  · seizures  Side effects that usually do not require medical attention (report to your doctor or health care professional if they continue or are bothersome):  · back pain  · dizziness  · flushing  · headache  · indigestion  · muscle aches  · nausea  · stuffy or  runny nose  What may interact with this medicine?  Do not take this medicine with any of the following medications:  · nitrates like amyl nitrite, isosorbide dinitrate, isosorbide mononitrate, nitroglycerin  · other medicines for erectile dysfunction like avanafil, sildenafil, vardenafil  · other tadalafil products (Adcirca)  · riociguat  This medicine may also interact with the following medications:  · certain drugs for high blood pressure  · certain drugs for the treatment of HIV infection or AIDS  · certain drugs used for fungal or yeast infections, like fluconazole, itraconazole, ketoconazole, and voriconazole  · certain drugs used for seizures like carbamazepine, phenytoin, and phenobarbital  · grapefruit juice  · macrolide antibiotics like clarithromycin, erythromycin, troleandomycin  · medicines for prostate problems  · rifabutin, rifampin or rifapentine  What if I miss a dose?  If you are taking this medicine as needed for erection problems, this does not apply. If you miss a dose while taking this medicine once daily for an erection problem, benign prostatic hyperplasia, or both, take it as soon as you remember, but do not take more than one dose per day.  Where should I keep my medicine?  Keep out of the reach of children.  Store at room temperature between 15 and 30 degrees C (59 and 86 degrees F). Throw away any unused medicine after the expiration date.  What should I tell my health care provider before I take this medicine?  They need to know if you have any of these conditions:  · bleeding disorders  · eye or vision problems, including a rare inherited eye disease called retinitis pigmentosa  · anatomical deformation of the penis, Peyronie's disease, or history of priapism (painful and prolonged erection)  · heart disease, angina, a history of heart attack, irregular heart beats, or other heart problems  · high or low blood pressure  · history of blood diseases, like sickle cell anemia or  leukemia  · history of stomach bleeding  · kidney disease  · liver disease  · stroke  · an unusual or allergic reaction to tadalafil, other medicines, foods, dyes, or preservatives  · pregnant or trying to get pregnant  · breast-feeding  What should I watch for while using this medicine?  If you notice any changes in your vision while taking this drug, call your doctor or health care professional as soon as possible. Stop using this medicine and call your health care provider right away if you have a loss of sight in one or both eyes.  Contact your doctor or health care professional right away if the erection lasts longer than 4 hours or if it becomes painful. This may be a sign of serious problem and must be treated right away to prevent permanent damage.  If you experience symptoms of nausea, dizziness, chest pain or arm pain upon initiation of sexual activity after taking this medicine, you should refrain from further activity and call your doctor or health care professional as soon as possible.  Do not drink alcohol to excess (examples, 5 glasses of wine or 5 shots of whiskey) when taking this medicine. When taken in excess, alcohol can increase your chances of getting a headache or getting dizzy, increasing your heart rate or lowering your blood pressure.  Using this medicine does not protect you or your partner against HIV infection (the virus that causes AIDS) or other sexually transmitted diseases.  NOTE:This sheet is a summary. It may not cover all possible information. If you have questions about this medicine, talk to your doctor, pharmacist, or health care provider. Copyright© 2017 [employer]        Erectile Dysfunction: Erectile Aids and Other Treatments  If you have erectile dysfunction (ED), treatment can help. Certain treatments work directly on your penis. Talk to your doctor about the pros and cons of each. And be sure to learn the correct technique.  Vacuum pump  · You slip a tube over your  penis. A simple pump then creates a vacuum that pulls blood into your penis. This causes an erection. You then put a tension ring around the base of your penis to hold in the blood. You then remove the tube. The tension ring must not stay on for more than 30 minutes.  · Risks and complications may include pain in your penis or scrotum. The penis may also feel cool or change color during your erection.    A vacuum pump draws blood into the penis, causing an erection.      Transurethral medicine  · You insert the end of a small applicator into your urethra to place a tiny, soft medicine  pellet. The medicine is absorbed into your penis. The drugs relax blood vessels so they can fill with blood. Within about 10 minutes, your penis can become rigid enough for sexual intercourse. To keep your erection, you may need to use a tension ring.  · Risks and complications may include pain and irritation of your urethra. Get medical help right away if you have an erection that lasts for longer than 4 hours.    A medicated pellet put into the urethra causes an erection.      Self-injections  · You inject a special drug into your penis. The drug relaxes blood vessels so they can fill with blood. Within about 10 minutes, your penis can become rigid enough for sexual intercourse. A tension ring is not needed to maintain your erection. Your doctor can explain the injection process to you.  · Risks and complications may include pain, bleeding, bruising, or scarring. Get medical help right away if you have an erection that lasts for longer than 4 hours.    Medication injected into the side of the penis causes an erection.      Tension ring  · A tension ring is usually used along with another type of treatment. Once enough blood has flowed into your penis to cause an erection, the tension ring will keep the blood from flowing out again. This maintains the erection. The ring must not stay on for more than 30 minutes. A tension ring is  also called a constriction ring or venous flow controller.  · Risks and complications may include pinched, bruised, or irritated skin. If you have an allergic reaction to latex rings, try a silicone ring.    A tension ring keeps blood from flowing out of the erect penis.      Date Last Reviewed: 1/1/2017  © 7751-7895 [employer]. [address] [place] [address]. All rights reserved. This information is not intended as a substitute for professional medical care. Always follow your healthcare professional's instructions.

## 2020-08-07 ENCOUNTER — OFFICE VISIT (OUTPATIENT)
Dept: DERMATOLOGY | Facility: CLINIC | Age: 50
End: 2020-08-07
Payer: MEDICAID

## 2020-08-07 DIAGNOSIS — B86 SCABIES: Primary | ICD-10-CM

## 2020-08-07 PROCEDURE — 87220 TISSUE EXAM FOR FUNGI: CPT | Mod: PBBFAC | Performed by: STUDENT IN AN ORGANIZED HEALTH CARE EDUCATION/TRAINING PROGRAM

## 2020-08-07 PROCEDURE — 99999 PR PBB SHADOW E&M-EST. PATIENT-LVL III: CPT | Mod: PBBFAC,,, | Performed by: STUDENT IN AN ORGANIZED HEALTH CARE EDUCATION/TRAINING PROGRAM

## 2020-08-07 PROCEDURE — 99202 OFFICE O/P NEW SF 15 MIN: CPT | Mod: 25,S$PBB,, | Performed by: STUDENT IN AN ORGANIZED HEALTH CARE EDUCATION/TRAINING PROGRAM

## 2020-08-07 PROCEDURE — 99213 OFFICE O/P EST LOW 20 MIN: CPT | Mod: PBBFAC | Performed by: STUDENT IN AN ORGANIZED HEALTH CARE EDUCATION/TRAINING PROGRAM

## 2020-08-07 PROCEDURE — 99202 PR OFFICE/OUTPT VISIT, NEW, LEVL II, 15-29 MIN: ICD-10-PCS | Mod: 25,S$PBB,, | Performed by: STUDENT IN AN ORGANIZED HEALTH CARE EDUCATION/TRAINING PROGRAM

## 2020-08-07 PROCEDURE — 99999 PR PBB SHADOW E&M-EST. PATIENT-LVL III: ICD-10-PCS | Mod: PBBFAC,,, | Performed by: STUDENT IN AN ORGANIZED HEALTH CARE EDUCATION/TRAINING PROGRAM

## 2020-08-07 RX ORDER — PERMETHRIN 50 MG/G
CREAM TOPICAL
Qty: 60 G | Refills: 1 | Status: SHIPPED | OUTPATIENT
Start: 2020-08-07 | End: 2023-01-17

## 2020-08-07 RX ORDER — HYDROXYZINE HYDROCHLORIDE 25 MG/1
25 TABLET, FILM COATED ORAL NIGHTLY
Qty: 30 TABLET | Refills: 0 | Status: SHIPPED | OUTPATIENT
Start: 2020-08-07 | End: 2020-09-11 | Stop reason: SDUPTHER

## 2020-08-07 NOTE — PROGRESS NOTES
Subjective:       Patient ID:  Indio Ryder Jr. is a 50 y.o. male who presents for   Chief Complaint   Patient presents with    Rash     body, itching x 5 mo tx benadryl w/ no relief     History of Present Illness: The patient presents with chief complaint of diffuse rash.  Location: trunk, wrists, finger spaces  Duration: 3 months  Signs/Symptoms: redness, itching, admits to frequently scratching  Prior treatments: benadryl  Admits to hospitlization prior to the onset of the rash.       Review of Systems   Constitutional: Negative for fever and chills.   Genitourinary: Negative for genital sores and genital itching.   Skin: Positive for itching and rash.        Objective:    Physical Exam   Constitutional: He appears well-developed and well-nourished. No distress.   Neurological: He is alert and oriented to person, place, and time. He is not disoriented.   Psychiatric: He has a normal mood and affect.   Skin:   Areas Examined (abnormalities noted in diagram):   Head / Face Inspection Performed  Neck Inspection Performed  Chest / Axilla Inspection Performed  Abdomen Inspection Performed  Back Inspection Performed  RUE Inspected  LUE Inspection Performed              Diagram Legend     Erythematous scaling macule/papule c/w actinic keratosis       Vascular papule c/w angioma      Pigmented verrucoid papule/plaque c/w seborrheic keratosis      Yellow umbilicated papule c/w sebaceous hyperplasia      Irregularly shaped tan macule c/w lentigo     1-2 mm smooth white papules consistent with Milia      Movable subcutaneous cyst with punctum c/w epidermal inclusion cyst      Subcutaneous movable cyst c/w pilar cyst      Firm pink to brown papule c/w dermatofibroma      Pedunculated fleshy papule(s) c/w skin tag(s)      Evenly pigmented macule c/w junctional nevus     Mildly variegated pigmented, slightly irregular-bordered macule c/w mildly atypical nevus      Flesh colored to evenly pigmented papule c/w  intradermal nevus       Pink pearly papule/plaque c/w basal cell carcinoma      Erythematous hyperkeratotic cursted plaque c/w SCC      Surgical scar with no sign of skin cancer recurrence      Open and closed comedones      Inflammatory papules and pustules      Verrucoid papule consistent consistent with wart     Erythematous eczematous patches and plaques     Dystrophic onycholytic nail with subungual debris c/w onychomycosis     Umbilicated papule    Erythematous-base heme-crusted tan verrucoid plaque consistent with inflamed seborrheic keratosis     Erythematous Silvery Scaling Plaque c/w Psoriasis     See annotation      Assessment / Plan:        Scabies - Mineral prep negative for scabies mites, however, clinical suspicion very high given recent hospitalization, itching diffusely including finger spaces, and minimal improvement with any topical or oral treatments.   -     permethrin (ELIMITE) 5 % cream; Apply neck down at night for 1 application. Wash off in morning and Repeat in 1 week  Dispense: 60 g; Refill: 1  -     hydrOXYzine HCL (ATARAX) 25 MG tablet; Take 1 tablet (25 mg total) by mouth every evening.  Dispense: 30 tablet; Refill: 0  -     If no improvement in 3-4 weeks, will biopsy.              Follow up in about 4 weeks (around 9/4/2020).

## 2020-08-10 NOTE — PROGRESS NOTES
"  Physical Therapy Daily Treatment Note     Name: Indio Ryder Jr.  Clinic Number: [id number]    Therapy Diagnosis:   Encounter Diagnosis   Name Primary?    Chronic left shoulder pain      Physician: Lorena Thakur MD    Visit Date: 8/11/2020    Therapy Diagnosis: decreased cervical and L shoulder ROM and pain with LUE weakness  Physician: Lorena Thakur MD     Physician Orders: PT Eval and Treat   Medical Diagnosis from Referral:       M54.12 (ICD-10-CM) - Cervical radiculopathy      Evaluation Date: 7/30/2020  Authorization Period Expiration: 10/31/2020  Plan of Care Expiration: 9/31/20  Visit # / Visits authorized: 1/ 16 (+1 for initial eval)      Time In: 5:15 pm   Time Out: 5:55 pm   Total Billable Time: 40 minutes      Precautions: covid -19 infection status    Subjective     Pt reports: its feeling okay today . Pt stated he thought he was getting an MRI done for his neck and shoulder , but they only did his neck. Pt stated the MRI revealed severe arthritis in his neck .   He was compliant with home exercise program.  Response to previous treatment: "I felt ok after"   Functional change: none sofar    Pain: 6/10  Location: L shoulder     Objective      Indio received therapeutic exercises to develop strength, endurance and ROM for 25 minutes including:   Shoulder extension YTB 3 x 10  Shoulder row OTB 3 x 10  ER YTB 3 x 10  Flexion in supine and progress towards upright against wall 3 x 10  Abduction in supine and progress towards upright against wall 3 x 10     Scapular stabilization supine at 90 deg and then 120 flex 3 x 10 CW and CCW circles progress towards upright     Indio received the following manual therapy techniques: Manual traction, Myofacial release and Soft tissue Mobilization were applied to the: levator. Suboccipital muscles, rhomboids, levator, scalenes and upper traps, supraspinatus for 15 minutes, including:    Home Exercises Provided and Patient Education Provided     Education " provided:   - Continue previous HEP     Written Home Exercises Provided: Patient instructed to cont prior HEP.  Exercises were reviewed and Indio was able to demonstrate them prior to the end of the session.  Indio demonstrated good  understanding of the education provided.     See EMR under Patient Instructions for exercises provided 8/11/2020.      Assessment     Pt tolerated first session following initial evaluation well . He exhibits L shoulder weakness and compensations made due to this with shoulder hiking. He was cued to help reduce shoulder hiking and for increased periscapular mm activation. He is limited with flexion and abduction ROM due to pain and was advised to perform exercises only within a pain free range. Will continue to progress as javier Borjas is progressing well towards his goals.   Pt prognosis is Fair. [place].     Pt will continue to benefit from skilled outpatient physical therapy to address the deficits listed in the problem list box on initial evaluation, provide pt/family education and to maximize pt's level of independence in the home and community environment.   Pt's spiritual, cultural and educational needs considered and pt agreeable to plan of care and goals.    Anticipated barriers to physical therapy: to be determined following MRI    Goals:  Short Term Goals: 3 weeks   1. Pt to be Independent with HEP for increased strength, endurance and functional mobility.  2. Pt to have decreased pain 3/10 for increased functional mobility and tolerance.  3. Pt to increase AROM to 60 cervical rotation bilaterally in degrees for increased functional mobility.        Long Term Goals: 8 weeks   1. Pt to be Independent with pain management strategies and verbalize 2 for increased strength, endurance and functional mobility.  2. Pt to have decreased pain 2/10 for increased functional mobility and tolerance.  3. Pt to increase AROM to 150 in abduction and 170 in flexion degrees on L shoulder  for increased functional mobility.  4. 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  5. Pt increase L shoulder strength in abd and flexion to 4/5 for increased functional activity.    Plan     Continue to progress per PT POC.     Amee Michel, PTA

## 2020-08-11 ENCOUNTER — CLINICAL SUPPORT (OUTPATIENT)
Dept: REHABILITATION | Facility: HOSPITAL | Age: 50
End: 2020-08-11
Attending: INTERNAL MEDICINE
Payer: MEDICAID

## 2020-08-11 DIAGNOSIS — G89.29 CHRONIC LEFT SHOULDER PAIN: ICD-10-CM

## 2020-08-11 DIAGNOSIS — M25.512 CHRONIC LEFT SHOULDER PAIN: ICD-10-CM

## 2020-08-11 PROCEDURE — 97110 THERAPEUTIC EXERCISES: CPT | Mod: PO,CQ

## 2020-08-12 NOTE — PROGRESS NOTES
"  Physical Therapy Daily Treatment Note     Name: Indio Ryder Jr.  Clinic Number: [id number]    Therapy Diagnosis:   Encounter Diagnosis   Name Primary?    Chronic left shoulder pain      Physician: Lorena Thakur MD    Visit Date: 8/13/2020    Therapy Diagnosis: decreased cervical and L shoulder ROM and pain with LUE weakness  Physician: Lorena Thakur MD     Physician Orders: PT Eval and Treat   Medical Diagnosis from Referral:       M54.12 (ICD-10-CM) - Cervical radiculopathy      Evaluation Date: 7/30/2020  Authorization Period Expiration: 10/31/2020  Plan of Care Expiration: 9/31/20  Visit # / Visits authorized: 2/ 16 (+1 for initial eval)      Time In: 10:00am (late arrival)  Time Out: 10:30am   Total Billable Time: 30 minutes   Charges: 1 TE, 1 MT     Precautions: covid -19 infection status    Subjective     Pt reports: Felt okay from last session, and it's feeling better.  He was compliant with home exercise program.   Response to previous treatment: "I felt ok after"   Functional change: none thus far    Pain: 6/10  Pain level after session:  0/10  Location: L shoulder     Objective      Indio received therapeutic exercises to develop strength, endurance and ROM for 15 minutes including:   Progressions/additions in bold.    Chin tuck- 10 reps  Upper trap stretch bilaterally (3x30 second hold)  Rotation (towel stretch, 3x30 second hold)  Shoulder extension OTB 3 x 10  Shoulder row OTB 3 x 10  ER OTB 3 x 10  Shoulder Flexion upright against wall 3 x 10  Shld Abduction supine - progress to upright against wall 3 x 10     Next visit:  Scapular stabilization supine at 90 deg flexion and then 120 flex 3 x 10 CW and CCW circles progress towards upright         Indio received the following manual therapy techniques: Manual traction, Myofacial release and Soft tissue Mobilization were applied to the: L shoulder into abduction (inferior capsular stretch), external rotation (both limited). Overhead " flexion, ER in scaption with elbow flexed (able to nearly reach oppositive shoulder)    Home Exercises Provided and Patient Education Provided     Education provided:   - Continue previous HEP     Written Home Exercises Provided: Patient instructed to cont prior HEP.  Exercises were reviewed and Indio was able to demonstrate them prior to the end of the session.  Indio demonstrated good  understanding of the education provided.     See EMR under Patient Instructions for exercises provided 8/11/2020.      Assessment     Pt tolerated the additions of stretching and progressions in exercise well with no adverse effects. Continues to c/o pain at level 6/10 in the neck and L shoulder with no pain at end of session. Would like a L shoulder MRI to r/o rotator cuff tear. Pt will benefit from continued PT services, focusing on regaining strength, AROM in the neck and L shoulder to allow pain-free motion.    Indio is progressing well towards his goals.   Pt prognosis is Fair. [place].     Pt will continue to benefit from skilled outpatient physical therapy to address the deficits listed in the problem list box on initial evaluation, provide pt/family education and to maximize pt's level of independence in the home and community environment.   Pt's spiritual, cultural and educational needs considered and pt agreeable to plan of care and goals.    Anticipated barriers to physical therapy: to be determined following MRI    Goals:  Short Term Goals: 3 weeks   1. Pt to be Independent with HEP for increased strength, endurance and functional mobility. Met (8/13/2020)  2. Pt to have decreased pain 3/10 for increased functional mobility and tolerance. Progressing, not met  3. Pt to increase AROM to 60 cervical rotation bilaterally in degrees for increased functional mobility. Progressing, not met        Long Term Goals: 8 weeks   1. Pt to be Independent with pain management strategies and verbalize 2 for increased strength,  endurance and functional mobility. Progressing, not met  2. Pt to have decreased pain 2/10 for increased functional mobility and tolerance. Progressing, not met  3. Pt to increase AROM to 150 in abduction and 170 in flexion degrees on L shoulder for increased functional mobility. Progressing, not met  4. 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 Progressing, not met  5. Pt increase L shoulder strength in abd and flexion to 4/5 for increased functional activity. Progressing, not met    Plan   Plan of care Certification: 7/30/2020 to 9/31/20.     Outpatient Physical Therapy 2 times weekly for 8 weeks to include the following interventions: Cervical/Lumbar Traction, Manual Therapy, Moist Heat/ Ice, Neuromuscular Re-ed, Patient Education and Therapeutic Exercise.       Julee Hamilton, PT

## 2020-08-13 ENCOUNTER — CLINICAL SUPPORT (OUTPATIENT)
Dept: REHABILITATION | Facility: HOSPITAL | Age: 50
End: 2020-08-13
Attending: INTERNAL MEDICINE
Payer: MEDICAID

## 2020-08-13 DIAGNOSIS — G89.29 CHRONIC LEFT SHOULDER PAIN: ICD-10-CM

## 2020-08-13 DIAGNOSIS — M25.512 CHRONIC LEFT SHOULDER PAIN: ICD-10-CM

## 2020-08-13 PROCEDURE — 97110 THERAPEUTIC EXERCISES: CPT | Mod: PO

## 2020-08-13 PROCEDURE — 97140 MANUAL THERAPY 1/> REGIONS: CPT | Mod: PO

## 2020-08-18 ENCOUNTER — CLINICAL SUPPORT (OUTPATIENT)
Dept: REHABILITATION | Facility: HOSPITAL | Age: 50
End: 2020-08-18
Attending: INTERNAL MEDICINE
Payer: MEDICAID

## 2020-08-18 DIAGNOSIS — M25.512 CHRONIC LEFT SHOULDER PAIN: ICD-10-CM

## 2020-08-18 DIAGNOSIS — G89.29 CHRONIC LEFT SHOULDER PAIN: ICD-10-CM

## 2020-08-18 PROCEDURE — 97110 THERAPEUTIC EXERCISES: CPT | Mod: PO,CQ

## 2020-08-18 NOTE — PROGRESS NOTES
"  Physical Therapy Daily Treatment Note     Name: Indio Ryder Jr.  Clinic Number: [id number]    Therapy Diagnosis:   No diagnosis found.  Physician: Lorena Thakur MD    Visit Date: 8/18/2020    Therapy Diagnosis: decreased cervical and L shoulder ROM and pain with LUE weakness  Physician: Lorena Thakur MD     Physician Orders: PT Eval and Treat   Medical Diagnosis from Referral:       M54.12 (ICD-10-CM) - Cervical radiculopathy      Evaluation Date: 7/30/2020  Authorization Period Expiration: 10/31/2020  Plan of Care Expiration: 9/31/20  Visit # / Visits authorized: 3/ 16 (+1 for initial eval)      Time In: 10:00am (late arrival)  Time Out: 10:30am   Total Billable Time: 30 minutes   Charges: 2 TE     Precautions: covid -19 infection status    Subjective     Pt reports: that he has difficulty bringing L shoulder above his head.  He was compliant with home exercise program.   Response to previous treatment: "I felt ok after"   Functional change: none thus far    Pain: 6/10  Pain level after session:  0/10  Location: L shoulder     Objective      Indio received therapeutic exercises to develop strength, endurance and ROM for 30 minutes including:   Progressions/additions in bold.    Chin tuck- 10 x15 with 5 sec holds  Cervical rotation x15 with 5 sec holds  Upper trap stretch bilaterally (3x30 second hold)    Shoulder extension OTB 3 x 10  Shoulder row OTB 3 x 10  ER OTB 3 x 10  Shoulder Flexion upright against wall 3 x 10  Shld Abduction supine 3 x 10 (will progress to upright against wall)  Scapular stabilization supine at 90 deg flexion and then 120 flex 3 x 10 CW and CCW circles (will progress towards upright)         Indio received the following manual therapy techniques: Manual traction, Myofacial release and Soft tissue Mobilization were applied to the: L shoulder into abduction (inferior capsular stretch), external rotation (both limited). Overhead flexion, ER in scaption with elbow flexed (able " to nearly reach oppositive shoulder) NP due to time constraints     Home Exercises Provided and Patient Education Provided     Education provided:   - Continue previous HEP     Written Home Exercises Provided: Patient instructed to cont prior HEP.  Exercises were reviewed and Indio was able to demonstrate them prior to the end of the session.  Indio demonstrated good  understanding of the education provided.     See EMR under Patient Instructions for exercises provided 8/11/2020.      Assessment   Pt arrived late for today's appointment but he was unable to be accommodated for the full 45 min of tx. Pt tolerated therapeutic exercise  progressions well with no adverse effects. It was noted that pt did not c/o pain in the neck upon arrival but did report moderate L shoulder pain upon arrival. Pt will benefit from continued PT services, focusing on regaining strength, AROM in the neck and L shoulder to allow pain-free motion.    Indio is progressing well towards his goals.   Pt prognosis is Fair. [place].     Pt will continue to benefit from skilled outpatient physical therapy to address the deficits listed in the problem list box on initial evaluation, provide pt/family education and to maximize pt's level of independence in the home and community environment.   Pt's spiritual, cultural and educational needs considered and pt agreeable to plan of care and goals.    Anticipated barriers to physical therapy: to be determined following MRI    Goals:  Short Term Goals: 3 weeks   1. Pt to be Independent with HEP for increased strength, endurance and functional mobility. Met (8/18/2020)  2. Pt to have decreased pain 3/10 for increased functional mobility and tolerance. Progressing, not met  3. Pt to increase AROM to 60 cervical rotation bilaterally in degrees for increased functional mobility. Progressing, not met        Long Term Goals: 8 weeks   1. Pt to be Independent with pain management strategies and verbalize  2 for increased strength, endurance and functional mobility. Progressing, not met  2. Pt to have decreased pain 2/10 for increased functional mobility and tolerance. Progressing, not met  3. Pt to increase AROM to 150 in abduction and 170 in flexion degrees on L shoulder for increased functional mobility. Progressing, not met  4. 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 Progressing, not met  5. Pt increase L shoulder strength in abd and flexion to 4/5 for increased functional activity. Progressing, not met    Plan   Plan of care Certification: 7/30/2020 to 9/31/20.     Outpatient Physical Therapy 2 times weekly for 8 weeks to include the following interventions: Cervical/Lumbar Traction, Manual Therapy, Moist Heat/ Ice, Neuromuscular Re-ed, Patient Education and Therapeutic Exercise.       Javid Griffiths, PTA

## 2020-08-20 ENCOUNTER — CLINICAL SUPPORT (OUTPATIENT)
Dept: REHABILITATION | Facility: HOSPITAL | Age: 50
End: 2020-08-20
Attending: INTERNAL MEDICINE
Payer: MEDICAID

## 2020-08-20 DIAGNOSIS — M25.512 CHRONIC LEFT SHOULDER PAIN: Primary | ICD-10-CM

## 2020-08-20 DIAGNOSIS — G89.29 CHRONIC LEFT SHOULDER PAIN: Primary | ICD-10-CM

## 2020-08-20 PROCEDURE — 97140 MANUAL THERAPY 1/> REGIONS: CPT | Mod: PO,CQ

## 2020-08-20 PROCEDURE — 97110 THERAPEUTIC EXERCISES: CPT | Mod: PO,CQ

## 2020-08-26 ENCOUNTER — CLINICAL SUPPORT (OUTPATIENT)
Dept: REHABILITATION | Facility: HOSPITAL | Age: 50
End: 2020-08-26
Payer: MEDICAID

## 2020-08-26 DIAGNOSIS — M25.512 CHRONIC LEFT SHOULDER PAIN: ICD-10-CM

## 2020-08-26 DIAGNOSIS — G89.29 CHRONIC LEFT SHOULDER PAIN: ICD-10-CM

## 2020-08-26 PROCEDURE — 97140 MANUAL THERAPY 1/> REGIONS: CPT | Mod: PO

## 2020-08-26 PROCEDURE — 97110 THERAPEUTIC EXERCISES: CPT | Mod: PO

## 2020-08-26 NOTE — PROGRESS NOTES
Physical Therapy Daily Treatment Note     Name: Indio Ryder Jr.  Clinic Number: [id number]    Therapy Diagnosis:   Encounter Diagnosis   Name Primary?    Chronic left shoulder pain      Physician: Lorena Thakur MD    Visit Date: 8/26/2020    Therapy Diagnosis: decreased cervical and L shoulder ROM and pain with LUE weakness  Physician: Lorena Thakur MD     Physician Orders: PT Eval and Treat   Medical Diagnosis from Referral:       M54.12 (ICD-10-CM) - Cervical radiculopathy      Evaluation Date: 7/30/2020  Authorization Period Expiration: 10/31/2020  Plan of Care Expiration: 9/30/20  Visit # / Visits authorized: 5/ 16 (+1 for initial eval)      Time In: 245 pm   Time Out: 323 pm   Total Billable Time: 38 minutes   Charges: 2 TE MT 1     Precautions: covid -19 infection status    Subjective     Pt reports: that he has stiffness in L shoulder today but no pain.  He was compliant with home exercise program.   Response to previous treatment: no adverse effects  Functional change: none thus far    Pain: 0/10  Pain level after session:  0/10  Location: L neck and shoulder    Objective      Indio received therapeutic exercises to develop strength, endurance and ROM for 30 minutes including:     Supine shld flexion with HOB 45 deg 3 x10  Serratus punches supine no elevation 3# 3 x 10  Shld Abduction sidelyng 3 x 10 (w    Shoulder extension yTB 3 x 10  Shoulder row OTB 3 x 10  ER yTB 3 x 10 NP    Levator scap stretch 2x30 sec NP  Shoulder Flexion upright against wall 2 x 10 emphasis on eccentric lowering   Shoulder abd upright against wall 2 x 10 as tolerated emphasis on eccentric lowering on hold unable to perform return to mat in sidelying HOB 30 deg 2 x  5-10 abd as tolerated    Scapular stabilization supine at 90 deg flexion and then 120 flex 3 x 10 CW and CCW circles (will progress towards upright) NP         Indio received the following manual therapy techniques: 8 min Manual traction, Myofacial  release and Soft tissue Mobilization were applied to the: levator and rhomboids, cervical traction    NO GH traction due to instability and mild subluxation due to lack muscle tone.    Home Exercises Provided and Patient Education Provided     Education provided:   - Continue previous HEP     Written Home Exercises Provided: Patient instructed to cont prior HEP.  Exercises were reviewed and Indio was able to demonstrate them prior to the end of the session.  Indio demonstrated good  understanding of the education provided.     See EMR under Patient Instructions for exercises provided 8/11/2020.      Assessment   Pt presented decreased GH and scapulothoracic stability with mild L GH subluxation. Pt with overuse of rhomboids and levator for stability with limits abd and flexion on left.  Pt which fatigue with exercises and tolerated only limited upright posture during exercise.  Pt with pain well controlled. Pt achieved AROM cervical rotation goal with +70 deg bilaterally and pain goals.     Indio is progressing well towards his goals.   Pt prognosis is good    Pt will continue to benefit from skilled outpatient physical therapy to address the deficits listed in the problem list box on initial evaluation, provide pt/family education and to maximize pt's level of independence in the home and community environment.   Pt's spiritual, cultural and educational needs considered and pt agreeable to plan of care and goals.    Anticipated barriers to physical therapy: to be determined following MRI    Goals:  Short Term Goals: 3 weeks   1. Pt to be Independent with HEP for increased strength, endurance and functional mobility. Met (8/26/2020)  2. Pt to have decreased pain 3/10 for increased functional mobility and tolerance. MET 8/26/20  3. Pt to increase AROM to 60 cervical rotation bilaterally in degrees for increased functional mobility. MET 8/26/20        Long Term Goals: 8 weeks   1. Pt to be Independent with pain  management strategies and verbalize 2 for increased strength, endurance and functional mobility. Progressing, not met  2. Pt to have decreased pain 2/10 for increased functional mobility and tolerance. MET 8/26/20  3. Pt to increase AROM to 150 in abduction and 170 in flexion degrees on L shoulder for increased functional mobility. Progressing 8/26/20  4. 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 Progressing, not met  5. Pt increase L shoulder strength in abd and flexion to 4/5 for increased functional activity. Progressing, not met    Plan   Plan of care Certification: 7/30/2020 to 9/30/20.     Outpatient Physical Therapy 2 times weekly for 8 weeks to include the following interventions: Cervical/Lumbar Traction, Manual Therapy, Moist Heat/ Ice, Neuromuscular Re-ed, Patient Education and Therapeutic Exercise.       Kirsty Doe, PT

## 2020-09-03 ENCOUNTER — HOSPITAL ENCOUNTER (EMERGENCY)
Facility: HOSPITAL | Age: 50
Discharge: HOME | End: 2020-09-03
Attending: EMERGENCY MEDICINE
Payer: MEDICAID

## 2020-09-03 ENCOUNTER — OFFICE VISIT (OUTPATIENT)
Dept: INTERNAL MEDICINE | Facility: CLINIC | Age: 50
End: 2020-09-03
Payer: MEDICAID

## 2020-09-03 VITALS
SYSTOLIC BLOOD PRESSURE: 84 MMHG | HEART RATE: 103 BPM | BODY MASS INDEX: 27.64 KG/M2 | WEIGHT: 208.56 LBS | HEIGHT: 73 IN | DIASTOLIC BLOOD PRESSURE: 60 MMHG | OXYGEN SATURATION: 100 %

## 2020-09-03 VITALS
BODY MASS INDEX: 27.57 KG/M2 | OXYGEN SATURATION: 99 % | SYSTOLIC BLOOD PRESSURE: 144 MMHG | DIASTOLIC BLOOD PRESSURE: 88 MMHG | HEART RATE: 86 BPM | RESPIRATION RATE: 18 BRPM | WEIGHT: 208 LBS | HEIGHT: 73 IN | TEMPERATURE: 97 F

## 2020-09-03 DIAGNOSIS — D64.9 ANEMIA, UNSPECIFIED TYPE: ICD-10-CM

## 2020-09-03 DIAGNOSIS — I95.9 HYPOTENSION: ICD-10-CM

## 2020-09-03 DIAGNOSIS — E86.0 DEHYDRATION: ICD-10-CM

## 2020-09-03 DIAGNOSIS — R11.0 NAUSEA: ICD-10-CM

## 2020-09-03 DIAGNOSIS — L03.119 CELLULITIS OF FOOT: Primary | ICD-10-CM

## 2020-09-03 DIAGNOSIS — I95.9 HYPOTENSION, UNSPECIFIED HYPOTENSION TYPE: ICD-10-CM

## 2020-09-03 DIAGNOSIS — L97.509 FOOT ULCER: ICD-10-CM

## 2020-09-03 LAB
ALBUMIN SERPL BCP-MCNC: 3.4 G/DL (ref 3.5–5.2)
ALP SERPL-CCNC: 162 U/L (ref 55–135)
ALT SERPL W/O P-5'-P-CCNC: 10 U/L (ref 10–44)
ANION GAP SERPL CALC-SCNC: 12 MMOL/L (ref 8–16)
AST SERPL-CCNC: 16 U/L (ref 10–40)
B-OH-BUTYR BLD STRIP-SCNC: 0.5 MMOL/L (ref 0–0.5)
BACTERIA #/AREA URNS AUTO: ABNORMAL /HPF
BASOPHILS # BLD AUTO: 0.03 K/UL (ref 0–0.2)
BASOPHILS NFR BLD: 0.5 % (ref 0–1.9)
BILIRUB SERPL-MCNC: 0.3 MG/DL (ref 0.1–1)
BILIRUB UR QL STRIP: NEGATIVE
BUN SERPL-MCNC: 21 MG/DL (ref 6–20)
CALCIUM SERPL-MCNC: 9.7 MG/DL (ref 8.7–10.5)
CAOX CRY UR QL COMP ASSIST: ABNORMAL
CHLORIDE SERPL-SCNC: 101 MMOL/L (ref 95–110)
CLARITY UR REFRACT.AUTO: ABNORMAL
CO2 SERPL-SCNC: 25 MMOL/L (ref 23–29)
COLOR UR AUTO: ABNORMAL
CREAT SERPL-MCNC: 1.6 MG/DL (ref 0.5–1.4)
CRP SERPL-MCNC: 14.4 MG/L (ref 0–8.2)
DIFFERENTIAL METHOD: ABNORMAL
EOSINOPHIL # BLD AUTO: 0.3 K/UL (ref 0–0.5)
EOSINOPHIL NFR BLD: 5 % (ref 0–8)
ERYTHROCYTE [DISTWIDTH] IN BLOOD BY AUTOMATED COUNT: 13.7 % (ref 11.5–14.5)
ERYTHROCYTE [SEDIMENTATION RATE] IN BLOOD BY WESTERGREN METHOD: 75 MM/HR (ref 0–23)
EST. GFR  (AFRICAN AMERICAN): 56.4 ML/MIN/1.73 M^2
EST. GFR  (NON AFRICAN AMERICAN): 48.8 ML/MIN/1.73 M^2
GLUCOSE SERPL-MCNC: 109 MG/DL (ref 70–110)
GLUCOSE UR QL STRIP: NEGATIVE
HCT VFR BLD AUTO: 41 % (ref 40–54)
HGB BLD-MCNC: 12.5 G/DL (ref 14–18)
HGB UR QL STRIP: NEGATIVE
HYALINE CASTS UR QL AUTO: 41 /LPF
IMM GRANULOCYTES # BLD AUTO: 0.03 K/UL (ref 0–0.04)
IMM GRANULOCYTES NFR BLD AUTO: 0.5 % (ref 0–0.5)
KETONES UR QL STRIP: NEGATIVE
LACTATE SERPL-SCNC: 1 MMOL/L (ref 0.5–2.2)
LEUKOCYTE ESTERASE UR QL STRIP: ABNORMAL
LYMPHOCYTES # BLD AUTO: 1.8 K/UL (ref 1–4.8)
LYMPHOCYTES NFR BLD: 32 % (ref 18–48)
MCH RBC QN AUTO: 28.2 PG (ref 27–31)
MCHC RBC AUTO-ENTMCNC: 30.5 G/DL (ref 32–36)
MCV RBC AUTO: 92 FL (ref 82–98)
MICROSCOPIC COMMENT: ABNORMAL
MONOCYTES # BLD AUTO: 0.4 K/UL (ref 0.3–1)
MONOCYTES NFR BLD: 6.7 % (ref 4–15)
NEUTROPHILS # BLD AUTO: 3.1 K/UL (ref 1.8–7.7)
NEUTROPHILS NFR BLD: 55.3 % (ref 38–73)
NITRITE UR QL STRIP: NEGATIVE
NRBC BLD-RTO: 0 /100 WBC
PH UR STRIP: 5 [PH] (ref 5–8)
PLATELET # BLD AUTO: 459 K/UL (ref 150–350)
PMV BLD AUTO: 9.9 FL (ref 9.2–12.9)
POCT GLUCOSE: 144 MG/DL (ref 70–110)
POTASSIUM SERPL-SCNC: 3.5 MMOL/L (ref 3.5–5.1)
PROT SERPL-MCNC: 8.7 G/DL (ref 6–8.4)
PROT UR QL STRIP: ABNORMAL
RBC # BLD AUTO: 4.44 M/UL (ref 4.6–6.2)
RBC #/AREA URNS AUTO: 2 /HPF (ref 0–4)
SODIUM SERPL-SCNC: 138 MMOL/L (ref 136–145)
SP GR UR STRIP: 1.03 (ref 1–1.03)
SQUAMOUS #/AREA URNS AUTO: 1 /HPF
URN SPEC COLLECT METH UR: ABNORMAL
WBC # BLD AUTO: 5.56 K/UL (ref 3.9–12.7)
WBC #/AREA URNS AUTO: 8 /HPF (ref 0–5)

## 2020-09-03 PROCEDURE — 99214 PR OFFICE/OUTPT VISIT, EST, LEVL IV, 30-39 MIN: ICD-10-PCS | Mod: S$PBB,,, | Performed by: INTERNAL MEDICINE

## 2020-09-03 PROCEDURE — 81001 URINALYSIS AUTO W/SCOPE: CPT

## 2020-09-03 PROCEDURE — 99214 OFFICE O/P EST MOD 30 MIN: CPT | Mod: S$PBB,,, | Performed by: INTERNAL MEDICINE

## 2020-09-03 PROCEDURE — 99213 OFFICE O/P EST LOW 20 MIN: CPT | Mod: PBBFAC,25 | Performed by: INTERNAL MEDICINE

## 2020-09-03 PROCEDURE — 99999 PR PBB SHADOW E&M-EST. PATIENT-LVL III: CPT | Mod: PBBFAC,,, | Performed by: INTERNAL MEDICINE

## 2020-09-03 PROCEDURE — 85652 RBC SED RATE AUTOMATED: CPT

## 2020-09-03 PROCEDURE — 93010 EKG 12-LEAD: ICD-10-PCS | Mod: ,,, | Performed by: INTERNAL MEDICINE

## 2020-09-03 PROCEDURE — 96361 HYDRATE IV INFUSION ADD-ON: CPT

## 2020-09-03 PROCEDURE — 99999 PR PBB SHADOW E&M-EST. PATIENT-LVL III: ICD-10-PCS | Mod: PBBFAC,,, | Performed by: INTERNAL MEDICINE

## 2020-09-03 PROCEDURE — 99284 PR EMERGENCY DEPT VISIT,LEVEL IV: ICD-10-PCS | Mod: ,,, | Performed by: EMERGENCY MEDICINE

## 2020-09-03 PROCEDURE — 80053 COMPREHEN METABOLIC PANEL: CPT

## 2020-09-03 PROCEDURE — 25000003 PHARM REV CODE 250: Performed by: PHYSICIAN ASSISTANT

## 2020-09-03 PROCEDURE — 99285 EMERGENCY DEPT VISIT HI MDM: CPT | Mod: 25,27

## 2020-09-03 PROCEDURE — 96360 HYDRATION IV INFUSION INIT: CPT

## 2020-09-03 PROCEDURE — 82962 GLUCOSE BLOOD TEST: CPT

## 2020-09-03 PROCEDURE — 93010 ELECTROCARDIOGRAM REPORT: CPT | Mod: ,,, | Performed by: INTERNAL MEDICINE

## 2020-09-03 PROCEDURE — 86140 C-REACTIVE PROTEIN: CPT

## 2020-09-03 PROCEDURE — 83605 ASSAY OF LACTIC ACID: CPT

## 2020-09-03 PROCEDURE — 93005 ELECTROCARDIOGRAM TRACING: CPT

## 2020-09-03 PROCEDURE — 82010 KETONE BODYS QUAN: CPT

## 2020-09-03 PROCEDURE — 99284 EMERGENCY DEPT VISIT MOD MDM: CPT | Mod: ,,, | Performed by: EMERGENCY MEDICINE

## 2020-09-03 PROCEDURE — 85025 COMPLETE CBC W/AUTO DIFF WBC: CPT

## 2020-09-03 RX ORDER — SODIUM CHLORIDE 9 MG/ML
INJECTION, SOLUTION INTRAVENOUS
Status: DISCONTINUED | OUTPATIENT
Start: 2020-09-03 | End: 2020-09-03

## 2020-09-03 RX ORDER — ONDANSETRON HYDROCHLORIDE 8 MG/1
8 TABLET, FILM COATED ORAL EVERY 8 HOURS PRN
Qty: 20 TABLET | Refills: 1 | Status: ON HOLD | OUTPATIENT
Start: 2020-09-03 | End: 2021-05-01 | Stop reason: HOSPADM

## 2020-09-03 RX ADMIN — SODIUM CHLORIDE 1000 ML: 0.9 INJECTION, SOLUTION INTRAVENOUS at 06:09

## 2020-09-03 NOTE — PROGRESS NOTES
Subjective:       Patient ID: Indio Ryder Jr. is a 50 y.o. male.    Chief Complaint: Follow-up and Nausea             F/u cervical radiculopathy  HPI   MRI showed Multilevel degenerative change with compressive sequelae as detailed above.  There is multilevel significant neural foraminal narrowing and canal stenosis.  However, no definite cord signal abnormality.   His radicular pain is much improved with PT.   He also has CTS and diabetic neuropathy.    No syncope or presyncope.  BP quite low today despite stopping anti-hypertensives.    He has another infected ulcer of foot - taking bactrim and another antibiotic.  Managed by wound care center in [place].  To see a surgeon in [place].    Sister and father  recently.  He figures a1c will be elevated as stressed over these events.      Still itching.  Derm note reviewed.    C/o nausea.  Vomited last several days, but not since Monday.  He thinks foot infection is making him nauseated.  Began before taking anitbiotics.  Sugar in 70's this am. Drank gatorade before driving down here,but has not eaten since yesterday.    No abd pain, heartburn.    He has a h/o DKA which started with poor oral intake and nausea.         Review of Systems   Constitutional: Negative for activity change and unexpected weight change.   Respiratory: Negative for chest tightness and shortness of breath.    Cardiovascular: Negative for chest pain and leg swelling.   Gastrointestinal: Negative for abdominal pain.   Integumentary:         itching   Neurological: Negative for headaches.   Psychiatric/Behavioral: Negative for dysphoric mood.         Objective:      Physical Exam  Constitutional:       Appearance: Normal appearance. He is obese.   Neurological:      General: No focal deficit present.      Mental Status: He is alert and oriented to person, place, and time.   Psychiatric:         Mood and Affect: Mood normal.         Behavior: Behavior normal.         Assessment:        1. Uncontrolled type 2 diabetes mellitus    2. Anemia, unspecified type    3. Nausea    4. Hypotension, unspecified hypotension type    5. Dehydration        Plan:       Indio was seen today for follow-up and nausea.    Diagnoses and all orders for this visit:    Uncontrolled type 2 diabetes mellitus  -     Hemoglobin A1C; Future  -     BETA - HYDROXYBUTYRATE, SERUM; Future  -     Refer to Emergency Dept.    Anemia, unspecified type  -     Ferritin; Future  -     Iron and TIBC; Future  -     CBC auto differential; Future    Nausea  -     Basic metabolic panel; Future    Hypotension, unspecified hypotension type    Dehydration    Other orders  -     ondansetron (ZOFRAN) 8 MG tablet; Take 1 tablet (8 mg total) by mouth every 8 (eight) hours as needed for Nausea.  -     Discontinue: 0.9%  NaCl infusion                Repeat BP 80/60.  I cannot hear BP when standing.   Due to nausea, poor oral intake hypotension and h/o DKA I have urged him to go to ED for labs and hydration.  He reluctantly agrees.

## 2020-09-03 NOTE — ED PROVIDER NOTES
Encounter Date: 9/3/2020       History     Chief Complaint   Patient presents with    Referral     Patient advised by PCP to come to ER for further evaluation of low blood pressure.     HPI   50-year-old male with past medical history of hypertension now off medications because his blood pressure was too low, hyperlipidemia, diabetes, recent left foot osteo in March, is status post multiple weeks of antibiotics through PICC line, antibiotics were discontinued but then restarted 1 week ago for possible recurrent infection, coming in sent in by his primary care doctor for low blood pressure.    Patient states that he was at the times and is also currently asymptomatic, he denies dizziness, weakness, chest pain, shortness of breath, abdominal pain, vomiting, diarrhea.  He does have some mild nausea from the antibiotics he is taking.  He denies any urinary frequency.  He does feel like 1 week ago the foot had an odor however after his 1 week antibiotics he feels that his foot has improved, no longer odors.  No fevers.     He does state that he has been off his blood pressure medications because his blood pressure has been running low.    Review of patient's allergies indicates:  No Known Allergies  Past Medical History:   Diagnosis Date    Actinomyces infection 1/17/2017    Right foot    Diabetic ketoacidosis without coma associated with type 2 diabetes mellitus 5/30/2017    Diabetic ulcer of right foot associated with type 2 diabetes mellitus 6/3/2015    Essential hypertension 6/6/2013    Group B streptococcal infection 1/13/2017    Mixed hyperlipidemia 8/12/2014    Shoulder impingement 8/12/2014    Subacute osteomyelitis of right foot 1/12/2017    Streptococcus agalactiae, Actinomyces odontolyticus    Traumatic amputation of fifth toe of right foot 07/02/2015    Type 2 diabetes mellitus with diabetic neuropathy, with long-term current use of insulin 5/3/2016    Ulcerative colitis, unspecified      Past  Surgical History:   Procedure Laterality Date    DEBRIDEMENT OF FOOT Left 7/14/2019    Procedure: DEBRIDEMENT, FOOT, with left 5th ray amputation;  Surgeon: Sis Hickman DPM;  Location: [facility] OR [address] [place] [address];  Service: Podiatry;  Laterality: Left;    DEBRIDEMENT OF FOOT Left 7/17/2019    Procedure: DEBRIDEMENT, FOOT with leftt 5th ray partial amputation with Neox Graft;  Surgeon: Mai Burrell DPM;  Location: [facility] OR [address] [place] [address];  Service: Podiatry;  Laterality: Left;  t    TOE AMPUTATION Right 06/05/2015    TOE AMPUTATION Right 01/19/2017     Family History   Adopted: Yes   Problem Relation Age of Onset    Hypertension Mother     Cancer Mother         breast    Diabetes Mother     Hypertension Father     Cataracts Father     Heart disease Father         mi    Diabetes Sister     No Known Problems Brother     Heart disease Sister         MI    No Known Problems Sister     Hypertension Maternal Aunt     No Known Problems Maternal Uncle     No Known Problems Paternal Aunt     No Known Problems Paternal Uncle     No Known Problems Maternal Grandmother     No Known Problems Maternal Grandfather     No Known Problems Paternal Grandmother     No Known Problems Paternal Grandfather     Amblyopia Neg Hx     Blindness Neg Hx     Glaucoma Neg Hx     Macular degeneration Neg Hx     Retinal detachment Neg Hx     Strabismus Neg Hx     Stroke Neg Hx     Thyroid disease Neg Hx      Social History     Tobacco Use    Smoking status: Never Smoker    Smokeless tobacco: Never Used   Substance Use Topics    Alcohol use: No     Frequency: Never     Binge frequency: Never    Drug use: No     Review of Systems  Constitutional:  No Fever, No Chills,   Eyes: No Vision Changes  ENT/Mouth: No sore throat, No rhinorrhea  Cardiovascular:  No Chest Pain, No Palpitations  Respiratory:  No Cough, No SOB  Gastrointestinal:  No Nausea, No Vomiting, No Diarrhea, No abdo pain.  Genitourinary:  No  pain, No  dysuria   Musculoskeletal:  No Arthralgias, No Back Pain, No Neck Pain, No recent trauma.  Skin:  No skin Lesions  Neuro:  No Weakness, chronic foot numbness from diabetes, No Dizziness, No Headache        Physical Exam     Initial Vitals [09/03/20 1618]   BP Pulse Resp Temp SpO2   118/73 (!) 116 16 97.5 °F (36.4 °C) 97 %      MAP       --         Physical Exam  Physical Exam:  CONSTITUTIONAL: Well developed, well nourished, in no acute distress.  HENT: Normocephalic, atraumatic    EYES: Sclerae anicteric   NECK: Supple, no thyroid enlargement  CARDIOVASCULAR: Regular rate and rhythm without any murmurs, gallops, rubs.  RESPIRATORY: Speaking in full sentences. Breathing comfortably. Auscultation of the lungs revealed normal breath sounds b/l, no wheezing, no rales, no rhonchi.  ABDOMEN: Soft and nontender, no masses, no rebound or guarding   NEUROLOGIC: Alert, interacting normally. No facial droop.   MSK:  Left foot with some mild plantar erythema surrounding small 1 cm ulcer.  No significant tenderness.  No odor.  Ft is appropriately warm.  Moving all four extremities.  Skin: Warm and dry. No visible rash on exposed areas of skin.    Psych: Mood and affect normal.                 ED Course   Procedures  Labs Reviewed   POCT GLUCOSE - Abnormal; Notable for the following components:       Result Value    POCT Glucose 144 (*)     All other components within normal limits   CBC W/ AUTO DIFFERENTIAL   COMPREHENSIVE METABOLIC PANEL   URINALYSIS, REFLEX TO URINE CULTURE   C-REACTIVE PROTEIN   SEDIMENTATION RATE          Imaging Results    None          Medical Decision Making:   History:   Old Medical Records: I decided to obtain old medical records.    50-year-old male with past medical history as noted coming in with low blood pressure in the setting of known left foot osteo which is now status post several weeks of IV antibiotics now just on oral antibiotics.  On exam patient is hemodynamically stable, well-appearing,  left foot with no significant necrosis, mild erythema no significant tenderness.    Will undertake workup including full set of labs, x-ray of the foot.  Lactate.  To risk stratify for any metabolic, hematologic abnormalities explaining his low blood pressure.  Will give 1 L IV fluids.    Patient is currently asymptomatic and well appearing.  He does note that he has had several low blood pressure readings previously which is why was taken off his blood pressure medications.  Low suspicion for sepsis at this time however will continue to monitor in the emergency department.  His blood pressure improved spontaneously without intervention prior to IV fluids.  He is mildly tachycardic.    If labs are unremarkable, patient continues to be asymptomatic, x-rays not suggestive of worsening osteo, patient will likely be discharged with outpatient follow-up.    Update:  Labs are unremarkable no white count.  No shift.  Patient is very mild TAHIR I although she has been as high as 1.9 in the past he has 1.6 today.  Got 1 L of IV fluids.  CRP/ESR is mildly more elevated than previously.  Beta hydroxybutyrate is negative.  There is trace leuks and many bacteria in the urine but patient is asymptomatic from a urinary standpoint.  X-ray shows no signs of osteo although there is some air in the plantar aspect of the foot right at the ulcer which is radiology notes concerning for infection. Could also be from nearby ulcer. Of note, patient has recently restarted, 2 antibiotics for his foot at this time, and he feels is foot is improving since then.     On re-evaluation patient continues to be asymptomatic.  His blood pressures in the ER have been consistently normal.  Lactate is low.    At this time given that he has been asymptomatic all times, that is blood pressure is now stable, that his labs are benign, I feel that this does not constitute a medical emergency at this time does not require inpatient treatment.  He is already on  antibiotics for known foot infection.  Will advise continue his antibiotics and to follow-up with his podiatrist ASAP.    Should he develop fevers, dizziness, weakness, pain in the foot, spreading redness in the foot, or any other new, worsening or worrisome symptoms he should return to the emergency department for re-evaluation.    Referred back to Podiatry and sent a note to his podiatrist for good follow-up.    Findings of ED work up explained to patient. Patient agrees with discharge plan and verbalizes understanding of return precautions.     MDM Complexity Points:   Problem Points:  1.New problem, with no additional ED work-up planned (maximum of 1) -    Data Points:  Review or order clinical lab tests, Review or order radiology test, Decision to obtain old records (in the EHR) and Review and summarization of old records                                       Clinical Impression:       ICD-10-CM ICD-9-CM   1. Cellulitis of foot  L03.119 682.7   2. Hypotension  I95.9 458.9   3. Foot ulcer  L97.509 707.15                                Sean Grayson MD  09/04/20 1122

## 2020-09-03 NOTE — FIRST PROVIDER EVALUATION
Emergency Department TeleTriage Encounter Note      CHIEF COMPLAINT    Chief Complaint   Patient presents with    Referral     Patient advised by PCP to come to ER for further evaluation of low blood pressure.       VITAL SIGNS   Initial Vitals [09/03/20 1618]   BP Pulse Resp Temp SpO2   118/73 (!) 116 16 97.5 °F (36.4 °C) 97 %      MAP       --            ALLERGIES    Review of patient's allergies indicates:  No Known Allergies    PROVIDER TRIAGE NOTE  Patient is a 50-year-old history of diabetes, ft ulcer currently on antibiotics is presenting to the ER for evaluation of hypotension.  Patient sent in from primary care physician who states that patient had a pressure systolic in the 80s in the office.  There is concern for DKA versus dehydration from the PCP.  Will order IV access, fluids and lab work. Pending further evaluation and final disposition by ED provider.        ORDERS  Labs Reviewed   POCT GLUCOSE - Abnormal; Notable for the following components:       Result Value    POCT Glucose 144 (*)     All other components within normal limits   CBC W/ AUTO DIFFERENTIAL   COMPREHENSIVE METABOLIC PANEL   URINALYSIS, REFLEX TO URINE CULTURE   C-REACTIVE PROTEIN   SEDIMENTATION RATE       ED Orders (720h ago, onward)    Start Ordered     Status Ordering Provider    09/03/20 1745 09/03/20 1734  sodium chloride 0.9% bolus 1,000 mL  ED 1 Time      Ordered LORIN ZAMARRIPA    09/03/20 1735 09/03/20 1734  X-Ray Foot Complete Left  1 time imaging      Ordered LORIN ZAMARRIPA    09/03/20 1734 09/03/20 1734  Sedimentation rate  STAT  Collect    Ordered LORIN ZAMARRIPA    09/03/20 1733 09/03/20 1734  CBC auto differential  STAT  Collect    Ordered LORIN ZAMARRIPA    09/03/20 1733 09/03/20 1734  Comprehensive metabolic panel  STAT  Collect    Ordered LORENYKUTTY, LORIN    09/03/20 1733 09/03/20 1734  Urinalysis, Reflex to Urine Culture Urine, Clean Catch  STAT      Ordered LORIN ZAMARRIPA    09/03/20 1733 09/03/20  1734  Insert Saline lock IV  Once      Ordered JOHNYKUTTY, LORIN    09/03/20 1733 09/03/20 1734  EKG 12-lead  Once      Ordered JOHNYKUTTY, LORIN    09/03/20 1733 09/03/20 1734  Cardiac Monitoring - Adult  Continuous     Comments: Notify Physician If:    Ordered JOHNYKUTTY, LORIN    09/03/20 1733 09/03/20 1734  Pulse Oximetry Continuous  Continuous      Ordered JOHNYKUTTY, LORIN    09/03/20 1733 09/03/20 1734  C-reactive protein  STAT  Collect    Ordered JOHNYKUTTY, LORIN    09/03/20 1619 09/03/20 1619  POCT glucose  Once  Completed    Final result EMERGENCY, DEPT PHYSICIAN            Virtual Visit Note: The provider triage portion of this emergency department evaluation and documentation was performed via [employer], a HIPAA-compliant telemedicine application, in concert with a tele-presenter in the room. A face to face patient evaluation with one of my colleagues will occur once the patient is placed in an emergency department room.      DISCLAIMER: This note was prepared with [employer] voice recognition transcription software. Garbled syntax, mangled pronouns, and other bizarre constructions may be attributed to that software system.

## 2020-09-04 ENCOUNTER — TELEPHONE (OUTPATIENT)
Dept: PODIATRY | Facility: CLINIC | Age: 50
End: 2020-09-04

## 2020-09-04 ENCOUNTER — PATIENT OUTREACH (OUTPATIENT)
Dept: ADMINISTRATIVE | Facility: OTHER | Age: 50
End: 2020-09-04

## 2020-09-04 NOTE — TELEPHONE ENCOUNTER
Nurse spoke to pt regarding appt. He is offered a sooner appt but denies it he only wants to see Dr. Almonte. He states that he goes to wound care every Monday and he is ok with waiting for [facility]. He is scheduled and notice is mailed

## 2020-09-04 NOTE — TELEPHONE ENCOUNTER
Nurse called pt to schedule appt no answer LVM      ----- Message from Mai Burrell DPM sent at 9/4/2020  9:02 AM CDT -----  Asap appt w/ any provider  ----- Message -----  From: Sean Grayson MD  Sent: 9/3/2020   8:32 PM CDT  To: Mai Burrell DPM, #    Hello Dr. Burrell,    My name is Sushant Grayson. I'm of of the ED docs.   Mr. Ryder came to the ED with an episode of hypotension, sent by his PMD.  It resolved with 1 L fluids and labs were not concerning.  He was asymptomatic from a blood pressure perspective at all times.    However his foot looks like it may be developing a new infection.  He has been restarted on antibiotics by his wound care team.  Given that he had a history of osteo requiring long IV antibiotics, he would likely benefit from following up with you promptly for re-evaluation and possible further treatment/imaging.  He will continue the antibiotic that he has been put on by his wound management team.  He was given very strict return precautions to the emergency department.    Many thanks,    Sushant Grayson MD

## 2020-09-04 NOTE — DISCHARGE INSTRUCTIONS
You were sent to the emergency department for a episode of low blood pressure.  Your blood pressure in the emergency department was normal at all times.  Your labs do not show any signs of dangerous medical conditions but there is some concern that your foot infection may be recurring.    Please continue the antibiotics you were placed on by her wound care team.  Please follow-up within 1 week with your podiatrist for continued evaluation of your foot.  A message was sent to your podiatrist to help expedite your follow-up.    If you develop foot pain, spreading redness of the foot, pus discharge from the foot, smell from the foot, fevers, dizziness, weakness or any other new, worsening or worrisome symptoms please return immediately to the emergency department for repeat evaluation.

## 2020-09-04 NOTE — PROGRESS NOTES
LINKS immunization registry updated  Care Everywhere updated  Health Maintenance updated  Chart reviewed for overdue Proactive [facility] Encounters ([facility]) health maintenance testing (CRS, Breast Ca, Diabetic Eye Exam)   Orders entered:N/A

## 2020-09-08 ENCOUNTER — OFFICE VISIT (OUTPATIENT)
Dept: UROLOGY | Facility: CLINIC | Age: 50
End: 2020-09-08
Payer: MEDICAID

## 2020-09-08 VITALS
BODY MASS INDEX: 29.22 KG/M2 | WEIGHT: 221.44 LBS | DIASTOLIC BLOOD PRESSURE: 80 MMHG | SYSTOLIC BLOOD PRESSURE: 128 MMHG | HEART RATE: 93 BPM

## 2020-09-08 DIAGNOSIS — N52.9 ERECTILE DYSFUNCTION, UNSPECIFIED ERECTILE DYSFUNCTION TYPE: Primary | ICD-10-CM

## 2020-09-08 DIAGNOSIS — Z12.5 ENCOUNTER FOR PROSTATE CANCER SCREENING: ICD-10-CM

## 2020-09-08 PROCEDURE — 99213 OFFICE O/P EST LOW 20 MIN: CPT | Mod: PBBFAC,PN | Performed by: NURSE PRACTITIONER

## 2020-09-08 PROCEDURE — 99999 PR PBB SHADOW E&M-EST. PATIENT-LVL III: CPT | Mod: PBBFAC,,, | Performed by: NURSE PRACTITIONER

## 2020-09-08 PROCEDURE — 99214 OFFICE O/P EST MOD 30 MIN: CPT | Mod: S$PBB,,, | Performed by: NURSE PRACTITIONER

## 2020-09-08 PROCEDURE — 99214 PR OFFICE/OUTPT VISIT, EST, LEVL IV, 30-39 MIN: ICD-10-PCS | Mod: S$PBB,,, | Performed by: NURSE PRACTITIONER

## 2020-09-08 PROCEDURE — 99999 PR PBB SHADOW E&M-EST. PATIENT-LVL III: ICD-10-PCS | Mod: PBBFAC,,, | Performed by: NURSE PRACTITIONER

## 2020-09-08 RX ORDER — LEVOFLOXACIN 750 MG/1
TABLET ORAL
COMMUNITY
Start: 2020-08-27 | End: 2020-10-01

## 2020-09-08 NOTE — PROGRESS NOTES
Subjective:      Indio Ryder Jr. is a 50 y.o. male who returns today regarding his ED.    He recently established care.  Reports history of ED.  Presents today to discuss trial of Cialis and for JOLENE.  He denies bothersome urinary symptoms such as dysuria, hematuria, urgency, frequency, weak stream.  Reports that Cialis is working well.  Denies erections lasting longer than 4 hr.     The following portions of the patient's history were reviewed and updated as appropriate: allergies, current medications, past family history, past medical history, past social history, past surgical history and problem list.    Review of Systems  A comprehensive multipoint review of systems was negative except as otherwise stated in the HPI.     Objective:   Vitals: /80 (BP Location: Left arm, Patient Position: Sitting, BP Method: Medium (Automatic))   Pulse 93   Wt 100.4 kg (221 lb 7.2 oz)   BMI 29.22 kg/m²     Physical Exam   General: alert and oriented, no acute distress  Respiratory: Symmetric expansion, non-labored breathing  Cardiovascular: regular rate and rhythm, no peripheral edema  Abdomen: soft, non distended  Genitourinary:   Prostate: 20grams no nodules; seminal vesicles not palpated   Rectum: normal rectal tone, no rectal mass, normal perineum  Skin: normal coloration and turgor, no rashes, no suspicious skin lesions noted  Neuro: no gross deficits  Psych: normal judgment and insight, normal mood/affect and non-anxious    Lab Review   Urinalysis demonstrates no specimen today  Lab Results   Component Value Date    WBC 5.56 09/03/2020    HGB 12.5 (L) 09/03/2020    HCT 41.0 09/03/2020    HCT 44 05/30/2017    MCV 92 09/03/2020     (H) 09/03/2020     Lab Results   Component Value Date    CREATININE 1.6 (H) 09/03/2020    BUN 21 (H) 09/03/2020     Lab Results   Component Value Date    PSA 0.65 08/11/2014    PSADIAG 0.50 08/06/2020       Imaging   No results found for this or any previous  visit.      Assessment:     1. Erectile dysfunction, unspecified erectile dysfunction type    2. Encounter for prostate cancer screening        Plan:   1. Continue Cialis as needed   2. RTC in 1 year for PSA and JOLENE

## 2020-09-08 NOTE — PATIENT INSTRUCTIONS
BPH (Enlarged Prostate)  The prostate is a gland at the base of the bladder. As some men get older, the prostate may get bigger in size. This problem is called benign prostatic hyperplasia (BPH). BPH puts pressure on the urethra. This is the tube that carries urine from the bladder to the penis. It may interfere with the flow of urine. It may also keep the bladder from emptying fully.    Symptoms of BPH include trouble starting urination and feeling as though the bladder isnt emptying all the way. It also includes a weak urine stream, dribbling and leaking of urine, and frequent and urgent urination (especially at night). BPH can increase the risk of urinary infections. It can also block off urine flow completely. If this occurs, a thin tube (catheter) may be passed into the bladder to help drain urine.  If symptoms are mild, no treatment may be needed right now. If symptoms are more severe, treatment is likely needed. The goal of treatment is to improve urine flow and reduce symptoms. Treatments can include medicine and procedures. Your healthcare provider will discuss treatment options with you as needed.  Home care  The following guidelines will help you care for yourself at home:  · Urinate as soon as you feel the urge. Don't try to hold your urine.  · Don't limit your fluid intake during the day. Drink 6 to 8 glasses of water or liquids a day. This prevents bacteria from building up in the bladder.  · Avoid drinking fluids after dinner to help reduce urination during the night.  · Avoid medicines that can worsen your symptoms. These include certain cold and allergy medicines and antidepressants. Diuretics used for high blood pressure can also worsen symptoms. Talk to your doctor about the medicines you take. Other choices may work better for you.  Prostate cancer screening  BPH does not increase the risk of prostate cancer. But because prostate cancer is a common cancer in men, screening is sometimes  recommended. This may help detect the cancer in its early stages when treatment is most effective. Factors that can increase the risk of prostate cancer include being -American or having a father or brother who had prostate cancer. A high-fat diet may also increase the risk of prostate cancer. Talk to your healthcare provider to see whether you should be screened for prostate cancer.  Follow-up care  Follow up with your healthcare provider, or as advised  To learn more, go to:  · National Kidney & Urologic Diseases Information Clearinghouse  kidney.niddk.nih.gov, 491.426.5459  When to seek medical advice  Call your healthcare provider right away if any of these occur:  · Fever of 100.4°F (38.0°C) or higher, or as advised  · Unable to pass urine for 8 hours  · Increasing pressure or pain in your bladder (lower abdomen)  · Blood in the urine  · Increasing low back pain, not related to injury  · Symptoms of urinary infection (increased urge to urinate, burning when passing urine, foul-smelling urine)  Date Last Reviewed: 7/1/2016 © 2000-2017 [employer]. [address] [place] [address]. All rights reserved. This information is not intended as a substitute for professional medical care. Always follow your healthcare professional's instructions.        Prostate-Specific Antigen (PSA)  Does this test have other names?  PSA  What is this test?  This test measures the level of prostate-specific antigen (PSA) in your blood.  The cells of the prostate gland make the protein called PSA. Men normally have low levels of PSA. If your PSA levels start to rise, it could mean you have prostate cancer, benign prostate conditions, inflammation, or an infection.  PSA testing is controversial because the U.S. Preventative Services Task Force discourages men who don't have any symptoms of prostate cancer from being screened. The task force says that PSA test results can lead to treating small cancers that  would never become life-threatening.  But the American Cancer Society believes men should be told the risks and benefits of PSA testing and allowed to make their own decision about if and when to be screened.  The American Urologic Society says that PSA screening is most important between the ages of 55 and 69. If you have a brother or father with prostate cancer or you are , you should start testing at age 40.   Why do I need this test?  You may have this test if you are 50 or older and your healthcare provider wants to screen you for prostate cancer. Some providers recommend screening at age 40 or 45 if you have a family history of prostate cancer or other risk factors.  You may also have this test if you have already been diagnosed with prostate cancer, so that your healthcare provider can monitor your treatment and see whether your cancer has come back.  What other tests might I have along with this test?  Your healthcare provider may also do a digital rectal exam (JOLENE). A JOLENE is a physical exam of the prostate, not a lab test. For the exam, your provider will place a gloved finger in your rectum and feel the prostate to check for any bumps or abnormal areas. The FDA recommends that a JOLENE be done along with a PSA test.  What do my test results mean?  Many things may affect your lab test results. These include the method each lab uses to do the test. Even if your test results are different from the normal value, you may not have a problem. To learn what the results mean for you, talk with your healthcare provider.  Results are given in nanograms per milliliter ng/mL. Normal results are below 4.0 ng/mL. A rising PSA may mean that you have cancer. But the PSA results alone won't tell your healthcare provider whether it's cancer or a benign prostate condition. If your provider suspects cancer, he or she will likely suggest that you have a biopsy of the prostate to make the diagnosis.  How is this  test done?  The test requires a blood sample, which is drawn through a needle from a vein in your arm.  Does this test pose any risks?  Taking a blood sample with a needle carries risks that include bleeding, infection, bruising, or feeling dizzy. When the needle pricks your arm, you may feel a slight stinging sensation or pain. Afterward, the site may be slightly sore.  What might affect my test results?  An infection can affect your results, as can certain medicines. Riding a bicycle and ejaculation may also affect your results.  How do I get ready for this test?  You may need to abstain from sex and not ride a bicycle within 1 to 2 days of the test. In addition, be sure your healthcare provider knows about all medicines, herbs, vitamins, and supplements you are taking. This includes medicines that don't need a prescription and any illicit drugs you may use.     © 7517-0441 [employer]. [address] [place] [address], [place] [address]. All rights reserved. This information is not intended as a substitute for professional medical care. Always follow your healthcare professional's instructions.

## 2020-09-11 DIAGNOSIS — B86 SCABIES: ICD-10-CM

## 2020-09-15 ENCOUNTER — HOSPITAL ENCOUNTER (OUTPATIENT)
Dept: RADIOLOGY | Facility: HOSPITAL | Age: 50
Discharge: HOME | End: 2020-09-15
Attending: STUDENT IN AN ORGANIZED HEALTH CARE EDUCATION/TRAINING PROGRAM
Payer: MEDICAID

## 2020-09-15 ENCOUNTER — OFFICE VISIT (OUTPATIENT)
Dept: PODIATRY | Facility: CLINIC | Age: 50
End: 2020-09-15
Payer: MEDICAID

## 2020-09-15 VITALS
HEART RATE: 102 BPM | SYSTOLIC BLOOD PRESSURE: 156 MMHG | DIASTOLIC BLOOD PRESSURE: 98 MMHG | BODY MASS INDEX: 29.29 KG/M2 | HEIGHT: 73 IN | RESPIRATION RATE: 17 BRPM | TEMPERATURE: 97 F | WEIGHT: 221 LBS

## 2020-09-15 DIAGNOSIS — L97.524 SKIN ULCER OF LEFT FOOT WITH NECROSIS OF BONE: ICD-10-CM

## 2020-09-15 DIAGNOSIS — L97.524 SKIN ULCER OF LEFT FOOT WITH NECROSIS OF BONE: Primary | ICD-10-CM

## 2020-09-15 DIAGNOSIS — M86.272 SUBACUTE OSTEOMYELITIS OF LEFT FOOT: ICD-10-CM

## 2020-09-15 PROCEDURE — 87075 CULTR BACTERIA EXCEPT BLOOD: CPT

## 2020-09-15 PROCEDURE — 99214 OFFICE O/P EST MOD 30 MIN: CPT | Mod: PBBFAC,25 | Performed by: PODIATRIST

## 2020-09-15 PROCEDURE — 87070 CULTURE OTHR SPECIMN AEROBIC: CPT

## 2020-09-15 PROCEDURE — 88305 TISSUE EXAM BY PATHOLOGIST: CPT | Performed by: PATHOLOGY

## 2020-09-15 PROCEDURE — 99214 PR OFFICE/OUTPT VISIT, EST, LEVL IV, 30-39 MIN: ICD-10-PCS | Mod: S$PBB,,, | Performed by: PODIATRIST

## 2020-09-15 PROCEDURE — 73630 X-RAY EXAM OF FOOT: CPT | Mod: 26,LT,, | Performed by: RADIOLOGY

## 2020-09-15 PROCEDURE — 88305 TISSUE EXAM BY PATHOLOGIST: CPT | Mod: 26,,, | Performed by: PATHOLOGY

## 2020-09-15 PROCEDURE — 87076 CULTURE ANAEROBE IDENT EACH: CPT

## 2020-09-15 PROCEDURE — 99999 PR PBB SHADOW E&M-EST. PATIENT-LVL IV: CPT | Mod: PBBFAC,,, | Performed by: PODIATRIST

## 2020-09-15 PROCEDURE — 99999 PR PBB SHADOW E&M-EST. PATIENT-LVL IV: ICD-10-PCS | Mod: PBBFAC,,, | Performed by: PODIATRIST

## 2020-09-15 PROCEDURE — 73630 X-RAY EXAM OF FOOT: CPT | Mod: TC,LT

## 2020-09-15 PROCEDURE — 99214 OFFICE O/P EST MOD 30 MIN: CPT | Mod: S$PBB,,, | Performed by: PODIATRIST

## 2020-09-15 PROCEDURE — 88305 TISSUE EXAM BY PATHOLOGIST: ICD-10-PCS | Mod: 26,,, | Performed by: PATHOLOGY

## 2020-09-15 PROCEDURE — 73630 XR FOOT COMPLETE 3 VIEW LEFT: ICD-10-PCS | Mod: 26,LT,, | Performed by: RADIOLOGY

## 2020-09-15 RX ORDER — HYDROXYZINE HYDROCHLORIDE 25 MG/1
25 TABLET, FILM COATED ORAL NIGHTLY
Qty: 30 TABLET | Refills: 0 | Status: ON HOLD | OUTPATIENT
Start: 2020-09-15 | End: 2021-05-01 | Stop reason: HOSPADM

## 2020-09-16 ENCOUNTER — PATIENT MESSAGE (OUTPATIENT)
Dept: PODIATRY | Facility: CLINIC | Age: 50
End: 2020-09-16

## 2020-09-16 NOTE — PROGRESS NOTES
Subjective:      Patient ID: Indio Ryder Jr. is a 50 y.o. male.    Chief Complaint: Follow-up (wound care ), Foot Ulcer (Left foot ), and Dressing Change    Indio is a 50 y.o. male who presents to the clinic for evaluation and treatment of high risk feet. 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 The patient's chief complaint is diabetic foot ulcer, L has been treated by wound care at [facility]. Unsure dr name. Recent started on PO Bactrim and Levo which he just completed . Patient currently  denies nausea, vomiting, fever, chills, fatigue, and  diarrhea. Blood sugars have been stable       PCP: Lorena Thakur MD    Date Last Seen by PCP:   Chief Complaint   Patient presents with    Follow-up     wound care     Foot Ulcer     Left foot     Dressing Change         Hemoglobin A1C   Date Value Ref Range Status   06/12/2020 7.2 (H) 4.0 - 5.6 % Final     Comment:     ADA Screening Guidelines:  5.7-6.4%  Consistent with prediabetes  >or=6.5%  Consistent with diabetes  High levels of fetal hemoglobin interfere with the HbA1C  assay. Heterozygous hemoglobin variants (HbS, HgC, etc)do  not significantly interfere with this assay.   However, presence of multiple variants may affect accuracy.     03/12/2020 12.0 (H) 4.0 - 5.6 % Final     Comment:     ADA Screening Guidelines:  5.7-6.4%  Consistent with  "prediabetes  >or=6.5%  Consistent with diabetes  High levels of fetal hemoglobin interfere with the HbA1C  assay. Heterozygous hemoglobin variants (HbS, HgC, etc)do  not significantly interfere with this assay.   However, presence of multiple variants may affect accuracy.     12/03/2019 8.4 (H) 4.0 - 5.6 % Final     Comment:     ADA Screening Guidelines:  5.7-6.4%  Consistent with prediabetes  >or=6.5%  Consistent with diabetes  High levels of fetal hemoglobin interfere with the HbA1C  assay. Heterozygous hemoglobin variants (HbS, HgC, etc)do  not significantly interfere with this assay.   However, presence of multiple variants may affect accuracy.         Review of Systems   Constitution: Negative for chills, decreased appetite and fever.   Cardiovascular: Negative for leg swelling.   Skin: Positive for dry skin, nail changes and poor wound healing.   Musculoskeletal: Negative for arthritis, joint pain, joint swelling and myalgias.   Gastrointestinal: Negative for nausea and vomiting.   Neurological: Negative for loss of balance, numbness and paresthesias.           Objective:       Vitals:    09/15/20 1453 09/15/20 1533 09/15/20 1534   BP: (!) 155/90 (!) 161/97 (!) 156/98   Pulse: 106 103 102   Resp: 17     Temp: 96.8 °F (36 °C)     TempSrc: Axillary     Weight: 100.2 kg (221 lb)     Height: 6' 1" (1.854 m)     PainSc: 0-No pain          Physical Exam  Vitals signs reviewed.   Constitutional:       Appearance: He is well-developed.   Cardiovascular:      Comments: dorsalis pedis and posterior tibial pulses are palpable bilaterally. Capillary refill time is within normal limits. + pedal hair growth         Musculoskeletal: Normal range of motion.         General: Swelling present. No tenderness.      Comments: Adequate joint range of motion without pain, limitation, nor crepitation Bilateral feet and ankle joints. Muscle strength is 5/5 in all groups bilaterally.         Skin:     General: Skin is warm and dry.     "  Findings: No erythema, lesion or rash.      Comments: Plantar L foot ulcer 0.5x0.5x1.0 cm w/ seropurulent drainage + swelling w/o crepitus . + exposed bone/tendon deep w/i wound. No erythema nor increased temp   Neurological:      Mental Status: He is alert and oriented to person, place, and time.      Sensory: No sensory deficit.      Comments: Neon-Nenita 5.07 monofilament is intact bilateral feet.      Psychiatric:         Behavior: Behavior normal.               Assessment:       Encounter Diagnoses   Name Primary?   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      Unclear what treatment protocol pt has been under going   Will start new infection work up   Cartilage free floating in wound today w/ septic jt /OM   Wound swab, bone bx, path specimen obtain   Infection labs ordered   New imaging w/ resolved emphysema   Debridement: With verbal consent,  nonviable tissues on the left foot were debrided beyond bone utilizing a  sterile No. 3 scalpel and forceps. Minimal bleeding controlled with direct pressure  The patient tolerated this well.     Dressings: Marlen NICKERSON  Offloading:Alfredo Gonzáles MA assisted w/ application of football dressing under my direct supervision. Darco shoe applied to offload the area. Advised patient that this should be worn on the affected foot at all times when ambulating     Follow-up:Patient is to return to the clinic in 1 week  for follow-up but [facility] if any signs of infection, such as fever, chills, sweats, increased redness or pain.    Short-term goals include maintaining good offloading and minimizing bioburden, promoting granulation and epithelialization to healing.  Long-term goals include keeping the wound healed by good offloading and medical management under the direction of internist.        .

## 2020-09-17 LAB — BACTERIA SPEC AEROBE CULT: NORMAL

## 2020-09-21 LAB — BACTERIA SPEC ANAEROBE CULT: NORMAL

## 2020-09-22 LAB — FINAL PATHOLOGIC DIAGNOSIS: NORMAL

## 2020-09-23 ENCOUNTER — OFFICE VISIT (OUTPATIENT)
Dept: PODIATRY | Facility: CLINIC | Age: 50
End: 2020-09-23
Payer: MEDICAID

## 2020-09-23 VITALS
HEART RATE: 96 BPM | RESPIRATION RATE: 18 BRPM | TEMPERATURE: 97 F | DIASTOLIC BLOOD PRESSURE: 91 MMHG | WEIGHT: 221 LBS | BODY MASS INDEX: 29.29 KG/M2 | SYSTOLIC BLOOD PRESSURE: 155 MMHG | HEIGHT: 73 IN

## 2020-09-23 DIAGNOSIS — L97.524 SKIN ULCER OF LEFT FOOT WITH NECROSIS OF BONE: Primary | ICD-10-CM

## 2020-09-23 DIAGNOSIS — M86.272 SUBACUTE OSTEOMYELITIS OF LEFT FOOT: ICD-10-CM

## 2020-09-23 PROCEDURE — 99999 PR PBB SHADOW E&M-EST. PATIENT-LVL IV: ICD-10-PCS | Mod: PBBFAC,,, | Performed by: PODIATRIST

## 2020-09-23 PROCEDURE — 99499 NO LOS: ICD-10-PCS | Mod: S$PBB,,, | Performed by: PODIATRIST

## 2020-09-23 PROCEDURE — 99214 OFFICE O/P EST MOD 30 MIN: CPT | Mod: PBBFAC,25 | Performed by: PODIATRIST

## 2020-09-23 PROCEDURE — 11044 DBRDMT BONE 1ST 20 SQ CM/<: CPT | Mod: S$PBB,,, | Performed by: PODIATRIST

## 2020-09-23 PROCEDURE — 11044 DBRDMT BONE 1ST 20 SQ CM/<: CPT | Mod: PBBFAC | Performed by: PODIATRIST

## 2020-09-23 PROCEDURE — 99499 UNLISTED E&M SERVICE: CPT | Mod: S$PBB,,, | Performed by: PODIATRIST

## 2020-09-23 PROCEDURE — 11044 PR DEBRIDEMENT, SKIN, SUB-Q TISSUE,MUSCLE,BONE,=<20 SQ CM: ICD-10-PCS | Mod: S$PBB,,, | Performed by: PODIATRIST

## 2020-09-23 PROCEDURE — 99999 PR PBB SHADOW E&M-EST. PATIENT-LVL IV: CPT | Mod: PBBFAC,,, | Performed by: PODIATRIST

## 2020-09-24 ENCOUNTER — TELEPHONE (OUTPATIENT)
Dept: PODIATRY | Facility: CLINIC | Age: 50
End: 2020-09-24

## 2020-09-24 NOTE — TELEPHONE ENCOUNTER
----- Message from Mai Burrell DPM sent at 9/24/2020  8:43 AM CDT -----  + acute OM. ID consult pending

## 2020-09-28 NOTE — PROGRESS NOTES
Subjective:      Patient ID: Indio Ryder Jr. is a 50 y.o. male.    Chief Complaint: Follow-up (wound care ), Foot Ulcer (left foot ), and Dressing Change (football )    Indio is a 50 y.o. male who presents to the clinic for evaluation and treatment of high risk feet. 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 The patient's chief complaint is diabetic foot ulcer, L has been treated by wound care at [facility]. Unsure dr name. Recent started on PO Bactrim and Levo which he just completed . Patient currently  denies nausea, vomiting, fever, chills, fatigue, and  diarrhea. Blood sugars have been stable     9.23.20- Patient has been in football dressing, ambulating in Darco shoe x 1 week with out issues .  PCP: Lorena Thakur MD    Date Last Seen by PCP:   Chief Complaint   Patient presents with    Follow-up     wound care     Foot Ulcer     left foot     Dressing Change     football          Hemoglobin A1C   Date Value Ref Range Status   06/12/2020 7.2 (H) 4.0 - 5.6 % Final     Comment:     ADA Screening Guidelines:  5.7-6.4%  Consistent with prediabetes  >or=6.5%  Consistent with diabetes  High levels of fetal hemoglobin interfere with the HbA1C  assay. Heterozygous hemoglobin variants (HbS, HgC, etc)do  not significantly interfere with this assay.   However, presence of multiple variants may affect  "accuracy.     03/12/2020 12.0 (H) 4.0 - 5.6 % Final     Comment:     ADA Screening Guidelines:  5.7-6.4%  Consistent with prediabetes  >or=6.5%  Consistent with diabetes  High levels of fetal hemoglobin interfere with the HbA1C  assay. Heterozygous hemoglobin variants (HbS, HgC, etc)do  not significantly interfere with this assay.   However, presence of multiple variants may affect accuracy.     12/03/2019 8.4 (H) 4.0 - 5.6 % Final     Comment:     ADA Screening Guidelines:  5.7-6.4%  Consistent with prediabetes  >or=6.5%  Consistent with diabetes  High levels of fetal hemoglobin interfere with the HbA1C  assay. Heterozygous hemoglobin variants (HbS, HgC, etc)do  not significantly interfere with this assay.   However, presence of multiple variants may affect accuracy.         Review of Systems   Constitution: Negative for chills, decreased appetite and fever.   Cardiovascular: Negative for leg swelling.   Skin: Positive for dry skin, nail changes and poor wound healing. Negative for flushing and itching.   Musculoskeletal: Negative for arthritis, joint pain, joint swelling and myalgias.   Gastrointestinal: Negative for nausea and vomiting.   Neurological: Negative for loss of balance, numbness and paresthesias.           Objective:       Vitals:    09/23/20 1433   BP: (!) 155/91   Pulse: 96   Resp: 18   Temp: 97 °F (36.1 °C)   TempSrc: Temporal   Weight: 100.2 kg (221 lb)   Height: 6' 1" (1.854 m)   PainSc: 0-No pain        Physical Exam  Vitals signs reviewed.   Constitutional:       Appearance: He is well-developed.   Cardiovascular:      Comments: dorsalis pedis and posterior tibial pulses are palpable bilaterally. Capillary refill time is within normal limits. + pedal hair growth         Musculoskeletal: Normal range of motion.         General: Swelling (improved) present. No tenderness.      Comments: Adequate joint range of motion without pain, limitation, nor crepitation Bilateral feet and ankle joints. " Muscle strength is 5/5 in all groups bilaterally.         Skin:     General: Skin is warm and dry.      Findings: No erythema, lesion or rash.      Comments: Ulcer location:  left, plantar foot   Pre debridement Measurements: 0.8x1.0x2.0 cm   Post debridement Measurements : 0.9x1.0x2.3 cm   Signs of infection: No- improving  Erythema: No  Undermining: No  Tunneling: No  Drainage: Serosanguineous  Periwound skin: intact  Wound Base: granular w/ deep probe to bone     Neurological:      Mental Status: He is alert and oriented to person, place, and time.      Sensory: No sensory deficit.      Comments: [place]-Nenita 5.07 monofilament is intact bilateral feet.      Psychiatric:         Behavior: Behavior normal.                   Assessment:       Encounter Diagnoses   Name Primary?   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    Path + Acute OM   Clinically the wound has stabilized.   ID consult pending   Infection labs stable   Debridement: With verbal consent, nonviable tissues on the left foot were debrided beyond bone utilizing a  sterile No. 3 scalpel and forceps. Minimal bleeding controlled with direct pressure  The patient tolerated this well.     Dressings: Aqu[employer]  Offloading:Alfredo Gonzáles MA assisted w/ application of  football dressing under my direct supervision. Darco shoe applied to offload the area. Advised patient that this should be worn on the affected foot at all times when ambulating     Follow-up:Patient is to return to the clinic in 1 week  for follow-up but should call [facility] immediately if any signs of infection, such as fever, chills, sweats, increased redness or pain.    Short-term goals include maintaining good offloading and minimizing bioburden, promoting granulation and epithelialization to healing.  Long-term goals include keeping the wound healed by good offloading and medical management under the direction of internist.        .

## 2020-10-01 ENCOUNTER — OFFICE VISIT (OUTPATIENT)
Dept: PODIATRY | Facility: CLINIC | Age: 50
End: 2020-10-01
Payer: MEDICAID

## 2020-10-01 ENCOUNTER — OFFICE VISIT (OUTPATIENT)
Dept: INFECTIOUS DISEASES | Facility: CLINIC | Age: 50
End: 2020-10-01
Payer: MEDICAID

## 2020-10-01 ENCOUNTER — TELEPHONE (OUTPATIENT)
Dept: INFECTIOUS DISEASES | Facility: CLINIC | Age: 50
End: 2020-10-01

## 2020-10-01 VITALS
BODY MASS INDEX: 29.29 KG/M2 | HEIGHT: 73 IN | WEIGHT: 221 LBS | TEMPERATURE: 97 F | SYSTOLIC BLOOD PRESSURE: 153 MMHG | DIASTOLIC BLOOD PRESSURE: 91 MMHG | RESPIRATION RATE: 18 BRPM | HEART RATE: 90 BPM

## 2020-10-01 DIAGNOSIS — E11.65 TYPE 2 DIABETES MELLITUS WITH HYPERGLYCEMIA, WITH LONG-TERM CURRENT USE OF INSULIN: ICD-10-CM

## 2020-10-01 DIAGNOSIS — M86.272 SUBACUTE OSTEOMYELITIS OF LEFT FOOT: ICD-10-CM

## 2020-10-01 DIAGNOSIS — Z79.4 TYPE 2 DIABETES MELLITUS WITH HYPERGLYCEMIA, WITH LONG-TERM CURRENT USE OF INSULIN: ICD-10-CM

## 2020-10-01 DIAGNOSIS — M86.172 OTHER ACUTE OSTEOMYELITIS OF LEFT FOOT: Primary | ICD-10-CM

## 2020-10-01 DIAGNOSIS — L97.524 SKIN ULCER OF LEFT FOOT WITH NECROSIS OF BONE: Primary | ICD-10-CM

## 2020-10-01 PROCEDURE — 99999 PR PBB SHADOW E&M-EST. PATIENT-LVL V: ICD-10-PCS | Mod: PBBFAC,,, | Performed by: PODIATRIST

## 2020-10-01 PROCEDURE — 99999 PR PBB SHADOW E&M-EST. PATIENT-LVL III: CPT | Mod: PBBFAC,,, | Performed by: PHYSICIAN ASSISTANT

## 2020-10-01 PROCEDURE — 99499 UNLISTED E&M SERVICE: CPT | Mod: S$PBB,,, | Performed by: PODIATRIST

## 2020-10-01 PROCEDURE — 99999 PR PBB SHADOW E&M-EST. PATIENT-LVL III: ICD-10-PCS | Mod: PBBFAC,,, | Performed by: PHYSICIAN ASSISTANT

## 2020-10-01 PROCEDURE — 11042 DBRDMT SUBQ TIS 1ST 20SQCM/<: CPT | Mod: PBBFAC | Performed by: PODIATRIST

## 2020-10-01 PROCEDURE — 99215 OFFICE O/P EST HI 40 MIN: CPT | Mod: PBBFAC,27 | Performed by: PODIATRIST

## 2020-10-01 PROCEDURE — 11042 DBRDMT SUBQ TIS 1ST 20SQCM/<: CPT | Mod: S$PBB,,, | Performed by: PODIATRIST

## 2020-10-01 PROCEDURE — 99214 PR OFFICE/OUTPT VISIT, EST, LEVL IV, 30-39 MIN: ICD-10-PCS | Mod: S$PBB,,, | Performed by: PHYSICIAN ASSISTANT

## 2020-10-01 PROCEDURE — 11042 PR DEBRIDEMENT, SKIN, SUB-Q TISSUE,=<20 SQ CM: ICD-10-PCS | Mod: S$PBB,,, | Performed by: PODIATRIST

## 2020-10-01 PROCEDURE — 99213 OFFICE O/P EST LOW 20 MIN: CPT | Mod: PBBFAC,25 | Performed by: PHYSICIAN ASSISTANT

## 2020-10-01 PROCEDURE — 99214 OFFICE O/P EST MOD 30 MIN: CPT | Mod: S$PBB,,, | Performed by: PHYSICIAN ASSISTANT

## 2020-10-01 PROCEDURE — 99999 PR PBB SHADOW E&M-EST. PATIENT-LVL V: CPT | Mod: PBBFAC,,, | Performed by: PODIATRIST

## 2020-10-01 PROCEDURE — 99499 NO LOS: ICD-10-PCS | Mod: S$PBB,,, | Performed by: PODIATRIST

## 2020-10-01 RX ORDER — DOXYCYCLINE HYCLATE 100 MG
100 TABLET ORAL EVERY 12 HOURS
Qty: 14 TABLET | Refills: 0 | Status: SHIPPED | OUTPATIENT
Start: 2020-10-01 | End: 2020-10-08

## 2020-10-01 RX ORDER — LEVOFLOXACIN 750 MG/1
750 TABLET ORAL DAILY
Qty: 7 TABLET | Refills: 0 | Status: SHIPPED | OUTPATIENT
Start: 2020-10-01 | End: 2020-10-08

## 2020-10-01 NOTE — LETTER
October 1, 2020      Gerardo Pelaez MD  [address]  [facility] [address]           [facility] - Infectious Disease 1st Fl  [address]  [facility] [address]  Phone: [phone]  Fax: [phone]          Patient: Indio Ryder Jr.   MR Number: [id number]   YOB: 1970   Date of Visit: 10/1/2020       Dear Dr. Gerardo Pelaez:    Thank you for referring Indio Ryder to me for evaluation. Attached you will find relevant portions of my assessment and plan of care.    If you have questions, please do not hesitate to call me. I look forward to following Indio Ryder along with you.    Sincerely,    Cassandra Acuna PA-C    Enclosure  CC:  No Recipients    If you would like to receive this communication electronically, please contact [email or web address] or [phone] to request more information on [facility] access.    For providers and/or their staff who would like to refer a patient to [facility], please contact us through our one-stop-shop provider referral line, [facility], at [phone].    If you feel you have received this communication in error or would no longer like to receive these types of communications, please e-mail [email or web address]

## 2020-10-01 NOTE — PROGRESS NOTES
Subjective:      Patient ID: Indio Ryder Jr. is a 50 y.o. male.    Chief Complaint:No chief complaint on file.      History of Present Illness    HPI     Mr. Ryder is a 50 year old male with a history of DM2, diabetic neuropathy and chronic nonhealing diabetic left foot ulcer  with prior bone cx showing MSSA s/p 6 weeks of IV antibiotics (completed in April) who is seen in podiatry clinic today for evaluation and management of osteomyelitis. His wound has been present since at least March. He has been treated by wound care at [facility]. Unsure dr name. The wound is deep and malodorous. He was seen by podiatry on 9/15. Seropurulent drainage, swelling and exposed bone/tendon deep within wound noted. Bone biopsy taken and sent for cx and pathology. Bone cx returned positive for skin janes. Pathology with acute osteomyelitis. He has recently been treated with bactrim and levofloxacin. Off abx for over a week now. No fevers, chills, sweats. CRP 5.6/ESR > 120. X-ray 9/15 showed progressive osteolytic change at the 3rd and 4th MTP joints with subluxation most concerning for osteomyelitis and septic arthritis. No gas as seen on prior x-ray before completing oral abx.        RELIAPATH DIAGNOSIS:   BONE, LEFT FOOT, BIOPSY:   -Superficial bone with acute osteomyelitis.       Review of Systems   Constitution: Negative for chills, fever, malaise/fatigue and night sweats.   HENT: Negative for congestion and sore throat.    Eyes: Negative for blurred vision and visual disturbance.   Cardiovascular: Negative for chest pain and leg swelling.   Respiratory: Negative for cough, shortness of breath and sputum production.    Skin: Positive for dry skin and poor wound healing. Negative for color change and itching.   Musculoskeletal: Negative for back pain, joint pain and joint swelling.   Gastrointestinal: Negative for abdominal pain, diarrhea, heartburn, nausea and vomiting.   Genitourinary: Negative for dysuria, flank  pain and hematuria.   Neurological: Negative for dizziness, numbness and weakness.   Psychiatric/Behavioral: Negative for altered mental status and depression. The patient is not nervous/anxious.      Objective:   Physical Exam  Vitals signs reviewed.   Constitutional:       Appearance: He is not ill-appearing, toxic-appearing or diaphoretic.   HENT:      Head: Normocephalic and atraumatic.   Eyes:      General: No scleral icterus.     Conjunctiva/sclera: Conjunctivae normal.   Cardiovascular:      Rate and Rhythm: Normal rate and regular rhythm.   Pulmonary:      Effort: Pulmonary effort is normal. No respiratory distress.   Musculoskeletal:         General: Swelling present.      Left lower leg: Edema present.   Skin:     General: Skin is warm and dry.      Comments: Left plantar foot wound with granular base. Malodorous. No neha purulence. Wound probes deeply to bone.   Neurological:      Mental Status: He is alert and oriented to person, place, and time.   Psychiatric:         Mood and Affect: Mood normal.         Behavior: Behavior normal.         Thought Content: Thought content normal.         Judgment: Judgment normal.           Assessment:       1. Other acute osteomyelitis of left foot    2. Type 2 diabetes mellitus with hyperglycemia, with long-term current use of insulin          Plan:   - Start Vancomycin 1500 mg IV q 12 hours and Ceftriaxone 2 g IV q 24 hours for acute osteomyelitis.   - Will arrange PICC line placement ASAP. If unable to place within the next 24 hours, will start oral abx until IV antibiotics can be started  - Continue wound care per podiatry  - RTC in two weeks for reassessment   - The patient understands and agrees with the plan of care. Will call and update once PICC placement has been scheduled.

## 2020-10-02 ENCOUNTER — HOSPITAL ENCOUNTER (OUTPATIENT)
Facility: HOSPITAL | Age: 50
Discharge: HOME | End: 2020-10-02
Attending: RADIOLOGY | Admitting: RADIOLOGY
Payer: MEDICAID

## 2020-10-02 VITALS
DIASTOLIC BLOOD PRESSURE: 95 MMHG | HEART RATE: 84 BPM | OXYGEN SATURATION: 99 % | SYSTOLIC BLOOD PRESSURE: 163 MMHG | RESPIRATION RATE: 18 BRPM

## 2020-10-02 DIAGNOSIS — M86.172 OTHER ACUTE OSTEOMYELITIS OF LEFT FOOT: ICD-10-CM

## 2020-10-02 RX ORDER — SODIUM CHLORIDE 0.9 % (FLUSH) 0.9 %
10 SYRINGE (ML) INJECTION EVERY 6 HOURS
Status: DISCONTINUED | OUTPATIENT
Start: 2020-10-02 | End: 2020-10-02 | Stop reason: HOSPADM

## 2020-10-02 RX ORDER — SODIUM CHLORIDE 0.9 % (FLUSH) 0.9 %
10 SYRINGE (ML) INJECTION
Status: DISCONTINUED | OUTPATIENT
Start: 2020-10-02 | End: 2020-10-02 | Stop reason: HOSPADM

## 2020-10-02 NOTE — NURSING
Patient received to IR pre post area. KINZA Deleon with PICC team at bedside for consent. VS document.

## 2020-10-02 NOTE — NURSING
CXR okay per MD. Discharge instructions and PICC packet reviewed with patient. Patient wheeled to dc area via staff.

## 2020-10-02 NOTE — PROCEDURES
Indio Ryder Jr. is a 50 y.o. male patient.    Pulse: 84 (10/02/20 1208)  Resp: 18 (10/02/20 1208)  BP: (!) 163/95 (10/02/20 1208)  SpO2: 99 % (10/02/20 1208)    PICC  Date/Time: 10/2/2020 12:05 PM  Performed by: Pancho Herrera RN  Time out: Immediately prior to procedure a time out was called to verify the correct patient, procedure, equipment, support staff and site/side marked as required  Indications: med administration  Anesthesia: local infiltration  Local anesthetic: lidocaine 1% without epinephrine  Anesthetic Total (mL): 1  Preparation: skin prepped with ChloraPrep  Skin prep agent dried: skin prep agent completely dried prior to procedure  Sterile barriers: all five maximum sterile barriers used - cap, mask, sterile gown, sterile gloves, and large sterile sheet  Hand hygiene: hand hygiene performed prior to central venous catheter insertion  Location details: right basilic  Catheter type: double lumen  Catheter size: 5 Fr  Catheter Length: 41cm    Ultrasound guidance: yes  Vessel Caliber: medium and large and patent, compressibility normal  Needle advanced into vessel with real time Ultrasound guidance.  Guidewire confirmed in vessel.  Sterile sheath used.  Number of attempts: 1  Post-procedure: blood return through all ports, chlorhexidine patch and sterile dressing applied  Estimated blood loss (mL): 0            Pancho Herrera  10/2/2020

## 2020-10-02 NOTE — NURSING
Procedure complete. PICC line inserted to right upper arm by KINZA Deleon with [facility]. Patient is not complaining of any pain at this time. VSS. Awaiting CXR confirmation. Xray notified.

## 2020-10-05 ENCOUNTER — TELEPHONE (OUTPATIENT)
Dept: INFECTIOUS DISEASES | Facility: CLINIC | Age: 50
End: 2020-10-05

## 2020-10-05 ENCOUNTER — PATIENT MESSAGE (OUTPATIENT)
Dept: ADMINISTRATIVE | Facility: HOSPITAL | Age: 50
End: 2020-10-05

## 2020-10-05 NOTE — TELEPHONE ENCOUNTER
Called patient and reminded of appointment on 10/08. Patient stated will be at appointment. Ask how feeling and he stated good. Inquired about iv abx. He stated started on Friday and due for labs next Friday.

## 2020-10-05 NOTE — TELEPHONE ENCOUNTER
----- Message from Cassandra Acuna PA-C sent at 10/3/2020  6:42 PM CDT -----  Please schedule ID follow up in two weeks, preferably with podiatry. Can you also call Mr. Ryder and ensure he started his IV antibiotics and we have weekly labs arranged? Thank you.

## 2020-10-05 NOTE — PROGRESS NOTES
Subjective:      Patient ID: Indio Ryder Jr. is a 50 y.o. male.    Chief Complaint: Follow-up (WOUND CARE ), Foot Ulcer (LEFT FOOT ), Dressing Change, and Foot Problem (DRAINAGE )    Indio is a 50 y.o. male who presents to the clinic for evaluation and treatment of high risk feet. 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 The patient's chief complaint is diabetic foot ulcer, L . Patient has been in football dressing, ambulating in Darco shoe x 1 week with out issues         PCP: Lorena Thakur MD    Date Last Seen by PCP:   Chief Complaint   Patient presents with    Follow-up     WOUND CARE     Foot Ulcer     LEFT FOOT     Dressing Change    Foot Problem     DRAINAGE          Hemoglobin A1C   Date Value Ref Range Status   06/12/2020 7.2 (H) 4.0 - 5.6 % Final     Comment:     ADA Screening Guidelines:  5.7-6.4%  Consistent with prediabetes  >or=6.5%  Consistent with diabetes  High levels of fetal hemoglobin interfere with the HbA1C  assay. Heterozygous hemoglobin variants (HbS, HgC, etc)do  not significantly interfere with this assay.   However, presence of multiple variants may affect accuracy.     03/12/2020 12.0 (H) 4.0 - 5.6 % Final     Comment:     ADA Screening Guidelines:  5.7-6.4%  Consistent with prediabetes  >or=6.5%  Consistent with diabetes  High levels of fetal hemoglobin interfere with the  "HbA1C  assay. Heterozygous hemoglobin variants (HbS, HgC, etc)do  not significantly interfere with this assay.   However, presence of multiple variants may affect accuracy.     12/03/2019 8.4 (H) 4.0 - 5.6 % Final     Comment:     ADA Screening Guidelines:  5.7-6.4%  Consistent with prediabetes  >or=6.5%  Consistent with diabetes  High levels of fetal hemoglobin interfere with the HbA1C  assay. Heterozygous hemoglobin variants (HbS, HgC, etc)do  not significantly interfere with this assay.   However, presence of multiple variants may affect accuracy.         Review of Systems   Constitution: Negative for chills, decreased appetite and fever.   Cardiovascular: Negative for leg swelling.   Skin: Positive for dry skin, nail changes and poor wound healing. Negative for flushing, itching and rash.   Musculoskeletal: Negative for arthritis, joint pain, joint swelling and myalgias.   Gastrointestinal: Negative for nausea and vomiting.   Neurological: Negative for loss of balance, numbness and paresthesias.           Objective:       Vitals:    10/01/20 1316   BP: (!) 153/91   Pulse: 90   Resp: 18   Temp: 96.5 °F (35.8 °C)   TempSrc: Axillary   Weight: 100.2 kg (221 lb)   Height: 6' 1" (1.854 m)   PainSc: 0-No pain        Physical Exam  Vitals signs reviewed.   Constitutional:       Appearance: He is well-developed.   Cardiovascular:      Comments: dorsalis pedis and posterior tibial pulses are palpable bilaterally. Capillary refill time is within normal limits. + pedal hair growth         Musculoskeletal: Normal range of motion.         General: Swelling (stable ) present. No tenderness.      Comments: Adequate joint range of motion without pain, limitation, nor crepitation Bilateral feet and ankle joints. Muscle strength is 5/5 in all groups bilaterally.         Skin:     General: Skin is warm and dry.      Findings: No erythema, lesion or rash.      Comments: Ulcer location:  left, plantar foot   Pre debridement " Measurements: 0.8x0.8x2.0 cm   Post debridement Measurements : 0.9x0.9x2.0 cm   Signs of infection: No  Erythema: No  Undermining: No  Tunneling: No  Drainage: Serosanguineous  Periwound skin: intact  Wound Base: granular w/ deep probe to bone     Neurological:      Mental Status: He is alert and oriented to person, place, and time.      Sensory: No sensory deficit.      Comments: [place]-Nenita 5.07 monofilament is intact bilateral feet.      Psychiatric:         Behavior: Behavior normal.                       Assessment:       Encounter Diagnoses   Name Primary?   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    Path + Acute OM , ID evaluated patient today  Cassandra GOETZ with the Infectious Disease department evaluated patient in the clinic along with me today. Their input is greatly appreciated.     Debridement: With verbal consent, nonviable tissues on the left foot were debrided beyond bone utilizing a  sterile No. 3 scalpel and forceps. Minimal bleeding controlled with direct pressure  The patient tolerated this well.     Dressings: Aquacell AG  Offloading:Alfredo Gonzáles MA assisted w/ application of football dressing under my direct supervision. Shun coker  applied to offload the area. Advised patient that this should be worn on the affected foot at all times when ambulating     Follow-up:Patient is to return to the clinic in 1 week  for follow-up but should call [facility] immediately if any signs of infection, such as fever, chills, sweats, increased redness or pain.    Short-term goals include maintaining good offloading and minimizing bioburden, promoting granulation and epithelialization to healing.  Long-term goals include keeping the wound healed by good offloading and medical management under the direction of internist.        .

## 2020-10-07 ENCOUNTER — TELEPHONE (OUTPATIENT)
Dept: INFECTIOUS DISEASES | Facility: CLINIC | Age: 50
End: 2020-10-07

## 2020-10-07 NOTE — TELEPHONE ENCOUNTER
----- Message from Margie Velasco MA sent at 10/7/2020  1:33 PM CDT -----  Pt appointment was rescheduled with podiatry to 10/9/2020 so the pt will probably have to reschedule with Dr. Rivers as well

## 2020-10-08 ENCOUNTER — PATIENT OUTREACH (OUTPATIENT)
Dept: ADMINISTRATIVE | Facility: OTHER | Age: 50
End: 2020-10-08

## 2020-10-08 ENCOUNTER — PATIENT MESSAGE (OUTPATIENT)
Dept: PODIATRY | Facility: CLINIC | Age: 50
End: 2020-10-08

## 2020-10-08 ENCOUNTER — LAB VISIT (OUTPATIENT)
Dept: LAB | Facility: HOSPITAL | Age: 50
End: 2020-10-08
Attending: PHYSICIAN ASSISTANT
Payer: MEDICAID

## 2020-10-08 DIAGNOSIS — L97.524 ULCER OF TOE OF LEFT FOOT, WITH NECROSIS OF BONE: Primary | ICD-10-CM

## 2020-10-08 LAB
ALBUMIN SERPL BCP-MCNC: 3.3 G/DL (ref 3.5–5.2)
ALP SERPL-CCNC: 162 U/L (ref 55–135)
ALT SERPL W/O P-5'-P-CCNC: 19 U/L (ref 10–44)
ANION GAP SERPL CALC-SCNC: 14 MMOL/L (ref 8–16)
AST SERPL-CCNC: 16 U/L (ref 10–40)
BASOPHILS # BLD AUTO: 0.03 K/UL (ref 0–0.2)
BASOPHILS NFR BLD: 0.5 % (ref 0–1.9)
BILIRUB SERPL-MCNC: 0.6 MG/DL (ref 0.1–1)
BUN SERPL-MCNC: 11 MG/DL (ref 6–20)
CALCIUM SERPL-MCNC: 8.9 MG/DL (ref 8.7–10.5)
CHLORIDE SERPL-SCNC: 104 MMOL/L (ref 95–110)
CO2 SERPL-SCNC: 23 MMOL/L (ref 23–29)
CREAT SERPL-MCNC: 1.6 MG/DL (ref 0.5–1.4)
CRP SERPL-MCNC: 5.4 MG/L (ref 0–8.2)
DIFFERENTIAL METHOD: ABNORMAL
EOSINOPHIL # BLD AUTO: 0.2 K/UL (ref 0–0.5)
EOSINOPHIL NFR BLD: 2.7 % (ref 0–8)
ERYTHROCYTE [DISTWIDTH] IN BLOOD BY AUTOMATED COUNT: 14.3 % (ref 11.5–14.5)
ERYTHROCYTE [SEDIMENTATION RATE] IN BLOOD BY WESTERGREN METHOD: 84 MM/HR (ref 0–23)
EST. GFR  (AFRICAN AMERICAN): 56.4 ML/MIN/1.73 M^2
EST. GFR  (NON AFRICAN AMERICAN): 48.8 ML/MIN/1.73 M^2
GLUCOSE SERPL-MCNC: 336 MG/DL (ref 70–110)
HCT VFR BLD AUTO: 35.3 % (ref 40–54)
HGB BLD-MCNC: 10.7 G/DL (ref 14–18)
IMM GRANULOCYTES # BLD AUTO: 0.01 K/UL (ref 0–0.04)
IMM GRANULOCYTES NFR BLD AUTO: 0.2 % (ref 0–0.5)
LYMPHOCYTES # BLD AUTO: 1.3 K/UL (ref 1–4.8)
LYMPHOCYTES NFR BLD: 21.5 % (ref 18–48)
MCH RBC QN AUTO: 29 PG (ref 27–31)
MCHC RBC AUTO-ENTMCNC: 30.3 G/DL (ref 32–36)
MCV RBC AUTO: 96 FL (ref 82–98)
MONOCYTES # BLD AUTO: 0.6 K/UL (ref 0.3–1)
MONOCYTES NFR BLD: 10.1 % (ref 4–15)
NEUTROPHILS # BLD AUTO: 3.9 K/UL (ref 1.8–7.7)
NEUTROPHILS NFR BLD: 65 % (ref 38–73)
NRBC BLD-RTO: 0 /100 WBC
PLATELET # BLD AUTO: 229 K/UL (ref 150–350)
PMV BLD AUTO: 11.6 FL (ref 9.2–12.9)
POTASSIUM SERPL-SCNC: 3.5 MMOL/L (ref 3.5–5.1)
PROT SERPL-MCNC: 7.2 G/DL (ref 6–8.4)
RBC # BLD AUTO: 3.69 M/UL (ref 4.6–6.2)
SODIUM SERPL-SCNC: 141 MMOL/L (ref 136–145)
VANCOMYCIN TROUGH SERPL-MCNC: 25.9 UG/ML (ref 10–22)
WBC # BLD AUTO: 5.95 K/UL (ref 3.9–12.7)

## 2020-10-08 PROCEDURE — 85652 RBC SED RATE AUTOMATED: CPT

## 2020-10-08 PROCEDURE — 80053 COMPREHEN METABOLIC PANEL: CPT

## 2020-10-08 PROCEDURE — 86140 C-REACTIVE PROTEIN: CPT

## 2020-10-08 PROCEDURE — 85025 COMPLETE CBC W/AUTO DIFF WBC: CPT

## 2020-10-08 PROCEDURE — 80202 ASSAY OF VANCOMYCIN: CPT

## 2020-10-09 ENCOUNTER — OFFICE VISIT (OUTPATIENT)
Dept: PODIATRY | Facility: CLINIC | Age: 50
End: 2020-10-09
Payer: MEDICAID

## 2020-10-09 ENCOUNTER — OFFICE VISIT (OUTPATIENT)
Dept: INFECTIOUS DISEASES | Facility: CLINIC | Age: 50
End: 2020-10-09
Payer: MEDICAID

## 2020-10-09 VITALS
SYSTOLIC BLOOD PRESSURE: 183 MMHG | HEIGHT: 73 IN | BODY MASS INDEX: 31.24 KG/M2 | WEIGHT: 235.69 LBS | DIASTOLIC BLOOD PRESSURE: 95 MMHG | HEART RATE: 83 BPM

## 2020-10-09 DIAGNOSIS — L97.426 DIABETIC ULCER OF LEFT MIDFOOT ASSOCIATED WITH TYPE 2 DIABETES MELLITUS, WITH BONE INVOLVEMENT WITHOUT EVIDENCE OF NECROSIS: ICD-10-CM

## 2020-10-09 DIAGNOSIS — E11.65 TYPE 2 DIABETES MELLITUS WITH HYPERGLYCEMIA, WITH LONG-TERM CURRENT USE OF INSULIN: ICD-10-CM

## 2020-10-09 DIAGNOSIS — R26.9 ABNORMALITY OF GAIT AND MOBILITY: ICD-10-CM

## 2020-10-09 DIAGNOSIS — M86.272 SUBACUTE OSTEOMYELITIS OF LEFT FOOT: Primary | ICD-10-CM

## 2020-10-09 DIAGNOSIS — Z51.81 THERAPEUTIC DRUG MONITORING: ICD-10-CM

## 2020-10-09 DIAGNOSIS — E11.621 DIABETIC ULCER OF LEFT MIDFOOT ASSOCIATED WITH TYPE 2 DIABETES MELLITUS, WITH BONE INVOLVEMENT WITHOUT EVIDENCE OF NECROSIS: ICD-10-CM

## 2020-10-09 DIAGNOSIS — M86.172 OTHER ACUTE OSTEOMYELITIS OF LEFT FOOT: Primary | ICD-10-CM

## 2020-10-09 DIAGNOSIS — Z79.4 TYPE 2 DIABETES MELLITUS WITH HYPERGLYCEMIA, WITH LONG-TERM CURRENT USE OF INSULIN: ICD-10-CM

## 2020-10-09 PROCEDURE — 99999 PR PBB SHADOW E&M-EST. PATIENT-LVL IV: CPT | Mod: PBBFAC,,, | Performed by: PODIATRIST

## 2020-10-09 PROCEDURE — 99213 PR OFFICE/OUTPT VISIT, EST, LEVL III, 20-29 MIN: ICD-10-PCS | Mod: S$PBB,,, | Performed by: PHYSICIAN ASSISTANT

## 2020-10-09 PROCEDURE — 11042 DBRDMT SUBQ TIS 1ST 20SQCM/<: CPT | Mod: S$PBB,,, | Performed by: PODIATRIST

## 2020-10-09 PROCEDURE — 99213 OFFICE O/P EST LOW 20 MIN: CPT | Mod: S$PBB,,, | Performed by: PHYSICIAN ASSISTANT

## 2020-10-09 PROCEDURE — 99214 OFFICE O/P EST MOD 30 MIN: CPT | Mod: PBBFAC | Performed by: PODIATRIST

## 2020-10-09 PROCEDURE — 99213 PR OFFICE/OUTPT VISIT, EST, LEVL III, 20-29 MIN: ICD-10-PCS | Mod: 25,S$PBB,, | Performed by: PODIATRIST

## 2020-10-09 PROCEDURE — 11042 DBRDMT SUBQ TIS 1ST 20SQCM/<: CPT | Mod: PBBFAC | Performed by: PODIATRIST

## 2020-10-09 PROCEDURE — 99999 PR PBB SHADOW E&M-EST. PATIENT-LVL IV: ICD-10-PCS | Mod: PBBFAC,,, | Performed by: PODIATRIST

## 2020-10-09 PROCEDURE — 99213 OFFICE O/P EST LOW 20 MIN: CPT | Mod: 25,S$PBB,, | Performed by: PODIATRIST

## 2020-10-09 PROCEDURE — 11042 PR DEBRIDEMENT, SKIN, SUB-Q TISSUE,=<20 SQ CM: ICD-10-PCS | Mod: S$PBB,,, | Performed by: PODIATRIST

## 2020-10-09 NOTE — PROGRESS NOTES
Subjective:      Patient ID: Indio Ryder Jr. is a 50 y.o. male.    Chief Complaint: Foot Problem, Foot Ulcer (left ft.), Wound Care, and Dressing Change    Indio is a 50 y.o. male who presents to the clinic for evaluation and treatment of high risk feet. 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 The patient's chief complaint is diabetic foot ulcer, L . Pt has been seeing Dr. Almonte weekly. Patient has been in football dressing, ambulating in wedge Darco shoe x 1 week with out issues . Pt relates he was able to keep the dressing on and dry with no issues.     Pt relates he got picc line and is doing the abx with no issues; seeing ID today as well.     Pt relates his bp is high today in clinic because he was taken off his bp medication due to hypotension. No dizziness or other complaints.       PCP: Lorena Thakur MD    Date Last Seen by PCP: 7/28/20  Chief Complaint   Patient presents with    Foot Problem    Foot Ulcer     left ft.    Wound Care    Dressing Change         Hemoglobin A1C   Date Value Ref Range Status   06/12/2020 7.2 (H) 4.0 - 5.6 % Final     Comment:     ADA Screening Guidelines:  5.7-6.4%  Consistent with prediabetes  >or=6.5%  Consistent with diabetes  High levels of fetal hemoglobin interfere with the HbA1C  assay. Heterozygous hemoglobin variants (HbS, HgC, etc)do  not significantly  "interfere with this assay.   However, presence of multiple variants may affect accuracy.     03/12/2020 12.0 (H) 4.0 - 5.6 % Final     Comment:     ADA Screening Guidelines:  5.7-6.4%  Consistent with prediabetes  >or=6.5%  Consistent with diabetes  High levels of fetal hemoglobin interfere with the HbA1C  assay. Heterozygous hemoglobin variants (HbS, HgC, etc)do  not significantly interfere with this assay.   However, presence of multiple variants may affect accuracy.     12/03/2019 8.4 (H) 4.0 - 5.6 % Final     Comment:     ADA Screening Guidelines:  5.7-6.4%  Consistent with prediabetes  >or=6.5%  Consistent with diabetes  High levels of fetal hemoglobin interfere with the HbA1C  assay. Heterozygous hemoglobin variants (HbS, HgC, etc)do  not significantly interfere with this assay.   However, presence of multiple variants may affect accuracy.         Review of Systems   Constitution: Negative for chills, decreased appetite, diaphoresis, fever, malaise/fatigue, night sweats, weight gain and weight loss.   Cardiovascular: Negative for leg swelling.   Respiratory: Negative for cough.    Skin: Positive for dry skin and poor wound healing. Negative for flushing, itching and rash.   Musculoskeletal: Positive for neck pain. Negative for arthritis, joint pain, joint swelling and myalgias.   Gastrointestinal: Negative for nausea and vomiting.   Neurological: Positive for numbness. Negative for loss of balance and paresthesias.           Objective:       Vitals:    10/09/20 0914 10/09/20 1038   BP: (!) 172/97 (!) 183/95   Pulse: 88 83   Weight: 106.9 kg (235 lb 10.8 oz)    Height: 6' 1" (1.854 m)    PainSc: 0-No pain         Physical Exam  Vitals signs reviewed.   Constitutional:       General: He is not in acute distress.     Appearance: He is well-developed. He is not ill-appearing, toxic-appearing or diaphoretic.   Cardiovascular:      Pulses:           Dorsalis pedis pulses are 2+ on the left side.        Posterior " tibial pulses are 1+ on the left side.      Comments: dorsalis pedis and posterior tibial pulses are palpable  + pedal hair growth         Musculoskeletal:         General: Swelling (stable ) present. No tenderness.      Right lower le+ Edema present.      Left lower le+ Edema present.      Left foot: Decreased range of motion. Deformity and prominent metatarsal heads present.      Comments: No pop or with debridement       Feet:      Right foot:      Protective Sensation: 0 sites tested. 0 sites sensed.      Left foot:      Protective Sensation: 0 sites tested. 5 sites sensed.      Skin integrity: Ulcer present.   Skin:     General: Skin is warm and dry.      Findings: No erythema, lesion or rash.      Comments: Ulcer location:  left, plantar foot   Pre debridement Measurements: 1.0cm x 0.8cm x  1.1cm  Post debridement Measurements : 1.1cm x 0.8cm x 1.2cm   Signs of infection: No  Erythema: No  Undermining: No  Tunneling: No  Drainage: Serosanguineous  Periwound skin: intact  Wound Base: granular       Neurological:      Mental Status: He is alert and oriented to person, place, and time.      Gait: Gait abnormal.   Psychiatric:         Attention and Perception: Attention normal.         Mood and Affect: Mood normal.         Speech: Speech normal.         Behavior: Behavior normal. Behavior is cooperative.         Thought Content: Thought content normal.         Cognition and Memory: Cognition normal.         Judgment: Judgment normal.                           Assessment:       Encounter Diagnoses   Name Primary?    Subacute osteomyelitis of left foot Yes    Diabetic ulcer of left midfoot associated with type 2 diabetes mellitus, with bone involvement without evidence of necrosis     Abnormality of gait and mobility     Type 2 diabetes mellitus with hyperglycemia, with long-term current use of insulin          Plan:       Indio was seen today for foot problem, foot ulcer, wound care and dressing  change.    Diagnoses and all orders for this visit:    Subacute osteomyelitis of left foot    Diabetic ulcer of left midfoot associated with type 2 diabetes mellitus, with bone involvement without evidence of necrosis    Abnormality of gait and mobility    Type 2 diabetes mellitus with hyperglycemia, with long-term current use of insulin      I counseled the patient on his conditions, their implications and medical management.      Brook with the Infectious Disease department evaluated patient in the clinictoday. Their input is greatly appreciated.       Recommendations:                       - Continue vancomycin. vanc trough supratherapeutic at 26. Will hold vancomycin and recheck BMP and vanc level in AM. Will redose to                               [id number] once levels are therapeutic. Continue ceftriaxone [id number] for acute osteomyelitis. Discussed with Ela at [facility]. EOC                       11/13/2020.               - continue wound care per podiatry                          -  RTC towards end date of therapy                         -  Weekly CBC CMP ESR CRP VANC TROUGH               Debridement: With verbal consent, nonviable tissues on the left foot were debrided beyond sub q utilizing a  sterile No. 15 scalpel and forceps. Minimal bleeding controlled with direct pressure  The patient tolerated this well.     Dressings: [employer]  Offloading:Alfredo Avila MA assisted w/ application of football dressing under my direct supervision. Darco shoe applied to offload the area. Advised patient that this should be worn on the affected foot at all times when ambulating     Follow-up:Patient is to return to the clinic in 1 week  for follow-up but should call [facility] immediately if any signs of infection, such as fever, chills, sweats, increased redness or pain.    Short-term goals include maintaining good offloading and minimizing bioburden, promoting granulation and epithelialization to healing.   Long-term goals include keeping the wound healed by good offloading and medical management under the direction of internist.    - d/w pt htn. Repeat was higher. He will d/w pcp who took him off bp medication because he was getting too low ; encouraged to check at home on machine. He relates he gets good readings at home .    - d/w pt to consider total contact cast next visit  .   - one week with Dr. Almonte

## 2020-10-09 NOTE — PROGRESS NOTES
Subjective:      Patient ID: Indio Ryder Jr. is a 50 y.o. male.    Chief Complaint:No chief complaint on file.      History of Present Illness    HPI     Mr. Ryder is a 50 year old male with a history of DM2, diabetic neuropathy and chronic nonhealing diabetic left foot ulcer  with prior bone cx showing MSSA s/p 6 weeks of IV antibiotics (completed in April) who is seen in podiatry clinic today for evaluation and management of osteomyelitis. His wound has been present since at least March. He has been treated by wound care at [facility]. Unsure dr name. The wound is deep and malodorous. He was seen by podiatry on 9/15. Seropurulent drainage, swelling and exposed bone/tendon deep within wound noted. Bone biopsy taken and sent for cx and pathology. Bone cx returned positive for skin janes. Pathology with acute osteomyelitis. He has recently been treated with bactrim and levofloxacin. He was seen by ID on 10/1 for acute osteomyelitis of left foot. Noted to have purulent foul draining discharge. Cultures skin janes, no significant isolate. Denied having any fever chills or sweats. CRP 5.6.X-ray 9/15 showed progressive osteolytic change at the 3rd and 4th MTP joints with subluxation most concerning for osteomyelitis and septic arthritis. No gas as seen on prior x-ray before completing oral abx.    He was started on IV vancomycin and ceftriaxone (started 10/2). No issues with abx. No longer having foul smelling drainage. No fever chills or night sweats. PICC line RUE without any issues. He is seen with podiatry today.         RELIAPATH DIAGNOSIS:   BONE, LEFT FOOT, BIOPSY:   -Superficial bone with acute osteomyelitis.       Review of Systems   Constitution: Negative for chills, fever, malaise/fatigue and night sweats.   HENT: Negative for congestion and sore throat.    Eyes: Negative for blurred vision and visual disturbance.   Cardiovascular: Negative for chest pain and leg swelling.   Respiratory: Negative  for cough, shortness of breath and sputum production.    Skin: Positive for dry skin and poor wound healing. Negative for color change and itching.   Musculoskeletal: Negative for back pain, joint pain and joint swelling.   Gastrointestinal: Negative for abdominal pain, diarrhea, heartburn, nausea and vomiting.   Genitourinary: Negative for dysuria, flank pain and hematuria.   Neurological: Negative for dizziness, numbness and weakness.   Psychiatric/Behavioral: Negative for altered mental status and depression. The patient is not nervous/anxious.      Objective:   Physical Exam  Vitals signs reviewed.   Constitutional:       Appearance: He is not ill-appearing, toxic-appearing or diaphoretic.   HENT:      Head: Normocephalic and atraumatic.   Eyes:      General: No scleral icterus.     Conjunctiva/sclera: Conjunctivae normal.   Cardiovascular:      Rate and Rhythm: Normal rate and regular rhythm.   Pulmonary:      Effort: Pulmonary effort is normal. No respiratory distress.   Musculoskeletal:         General: Swelling present.      Left lower leg: Edema present.   Skin:     General: Skin is warm and dry.      Comments: wound bledding noted. No foul odor. No purulent drainage. Mild swelling. No erythema.   Neurological:      Mental Status: He is alert and oriented to person, place, and time.   Psychiatric:         Mood and Affect: Mood normal.         Behavior: Behavior normal.         Thought Content: Thought content normal.         Judgment: Judgment normal.               Assessment:       1. Other acute osteomyelitis of left foot    2. Therapeutic drug monitoring          Plan:   - Continue vancomycin. vanc trough supratherapeutic at 26. Will hold vancomycin and recheck BMP and vanc level in AM. Will redose to [id number] once levels are therapeutic. Continue ceftriaxone [id number] for acute osteomyelitis. Discussed with Ela at [facility]. EOC 11/13/2020.   - continue wound care per podiatry   -  RTC towards end  date of therapy  -  Weekly CBC CMP ESR CRP VANC TROUGH

## 2020-10-10 ENCOUNTER — LAB VISIT (OUTPATIENT)
Dept: LAB | Facility: HOSPITAL | Age: 50
End: 2020-10-10
Attending: PHYSICIAN ASSISTANT
Payer: MEDICAID

## 2020-10-10 DIAGNOSIS — M85.80 SAPHO SYNDROME: Primary | ICD-10-CM

## 2020-10-10 DIAGNOSIS — L70.9 SAPHO SYNDROME: Primary | ICD-10-CM

## 2020-10-10 DIAGNOSIS — L40.3 SAPHO SYNDROME: Primary | ICD-10-CM

## 2020-10-10 DIAGNOSIS — M65.9 SAPHO SYNDROME: Primary | ICD-10-CM

## 2020-10-10 DIAGNOSIS — M86.9 SAPHO SYNDROME: Primary | ICD-10-CM

## 2020-10-10 LAB
ANION GAP SERPL CALC-SCNC: 13 MMOL/L (ref 8–16)
BUN SERPL-MCNC: 10 MG/DL (ref 6–20)
CALCIUM SERPL-MCNC: 8.8 MG/DL (ref 8.7–10.5)
CHLORIDE SERPL-SCNC: 104 MMOL/L (ref 95–110)
CO2 SERPL-SCNC: 24 MMOL/L (ref 23–29)
CREAT SERPL-MCNC: 1.5 MG/DL (ref 0.5–1.4)
EST. GFR  (AFRICAN AMERICAN): >60 ML/MIN/1.73 M^2
EST. GFR  (NON AFRICAN AMERICAN): 52.8 ML/MIN/1.73 M^2
GLUCOSE SERPL-MCNC: 349 MG/DL (ref 70–110)
POTASSIUM SERPL-SCNC: 3.5 MMOL/L (ref 3.5–5.1)
SODIUM SERPL-SCNC: 141 MMOL/L (ref 136–145)
VANCOMYCIN SERPL-MCNC: 11.6 UG/ML

## 2020-10-10 PROCEDURE — 80048 BASIC METABOLIC PNL TOTAL CA: CPT

## 2020-10-10 PROCEDURE — 80202 ASSAY OF VANCOMYCIN: CPT

## 2020-10-13 ENCOUNTER — LAB VISIT (OUTPATIENT)
Dept: LAB | Facility: HOSPITAL | Age: 50
End: 2020-10-13
Attending: PHYSICIAN ASSISTANT
Payer: MEDICAID

## 2020-10-13 ENCOUNTER — TELEPHONE (OUTPATIENT)
Dept: INFECTIOUS DISEASES | Facility: HOSPITAL | Age: 50
End: 2020-10-13

## 2020-10-13 DIAGNOSIS — M86.9 INFLAMMATION OF BONE: Primary | ICD-10-CM

## 2020-10-13 LAB
ALBUMIN SERPL BCP-MCNC: 3.1 G/DL (ref 3.5–5.2)
ALP SERPL-CCNC: 160 U/L (ref 55–135)
ALT SERPL W/O P-5'-P-CCNC: 10 U/L (ref 10–44)
ANION GAP SERPL CALC-SCNC: 9 MMOL/L (ref 8–16)
AST SERPL-CCNC: 14 U/L (ref 10–40)
BASOPHILS # BLD AUTO: 0.03 K/UL (ref 0–0.2)
BASOPHILS NFR BLD: 0.5 % (ref 0–1.9)
BILIRUB SERPL-MCNC: 0.2 MG/DL (ref 0.1–1)
BUN SERPL-MCNC: 8 MG/DL (ref 6–20)
CALCIUM SERPL-MCNC: 8.9 MG/DL (ref 8.7–10.5)
CHLORIDE SERPL-SCNC: 106 MMOL/L (ref 95–110)
CO2 SERPL-SCNC: 27 MMOL/L (ref 23–29)
CREAT SERPL-MCNC: 1.4 MG/DL (ref 0.5–1.4)
CRP SERPL-MCNC: 4.3 MG/L (ref 0–8.2)
DIFFERENTIAL METHOD: ABNORMAL
EOSINOPHIL # BLD AUTO: 0.1 K/UL (ref 0–0.5)
EOSINOPHIL NFR BLD: 2.5 % (ref 0–8)
ERYTHROCYTE [DISTWIDTH] IN BLOOD BY AUTOMATED COUNT: 14 % (ref 11.5–14.5)
ERYTHROCYTE [SEDIMENTATION RATE] IN BLOOD BY WESTERGREN METHOD: 116 MM/HR (ref 0–23)
EST. GFR  (AFRICAN AMERICAN): >60 ML/MIN/1.73 M^2
EST. GFR  (NON AFRICAN AMERICAN): 57.4 ML/MIN/1.73 M^2
GLUCOSE SERPL-MCNC: 217 MG/DL (ref 70–110)
HCT VFR BLD AUTO: 34.6 % (ref 40–54)
HGB BLD-MCNC: 10.4 G/DL (ref 14–18)
IMM GRANULOCYTES # BLD AUTO: 0.01 K/UL (ref 0–0.04)
IMM GRANULOCYTES NFR BLD AUTO: 0.2 % (ref 0–0.5)
LYMPHOCYTES # BLD AUTO: 1.7 K/UL (ref 1–4.8)
LYMPHOCYTES NFR BLD: 31.2 % (ref 18–48)
MCH RBC QN AUTO: 28.4 PG (ref 27–31)
MCHC RBC AUTO-ENTMCNC: 30.1 G/DL (ref 32–36)
MCV RBC AUTO: 95 FL (ref 82–98)
MONOCYTES # BLD AUTO: 0.5 K/UL (ref 0.3–1)
MONOCYTES NFR BLD: 8.6 % (ref 4–15)
NEUTROPHILS # BLD AUTO: 3.2 K/UL (ref 1.8–7.7)
NEUTROPHILS NFR BLD: 57 % (ref 38–73)
NRBC BLD-RTO: 0 /100 WBC
PLATELET # BLD AUTO: 273 K/UL (ref 150–350)
PMV BLD AUTO: 11.2 FL (ref 9.2–12.9)
POTASSIUM SERPL-SCNC: 3.5 MMOL/L (ref 3.5–5.1)
PROT SERPL-MCNC: 7.1 G/DL (ref 6–8.4)
RBC # BLD AUTO: 3.66 M/UL (ref 4.6–6.2)
SODIUM SERPL-SCNC: 142 MMOL/L (ref 136–145)
VANCOMYCIN TROUGH SERPL-MCNC: 21.6 UG/ML (ref 10–22)
WBC # BLD AUTO: 5.55 K/UL (ref 3.9–12.7)

## 2020-10-13 PROCEDURE — 80053 COMPREHEN METABOLIC PANEL: CPT

## 2020-10-13 PROCEDURE — 85025 COMPLETE CBC W/AUTO DIFF WBC: CPT

## 2020-10-13 PROCEDURE — 80202 ASSAY OF VANCOMYCIN: CPT

## 2020-10-13 PROCEDURE — 85652 RBC SED RATE AUTOMATED: CPT

## 2020-10-13 PROCEDURE — 86140 C-REACTIVE PROTEIN: CPT

## 2020-10-13 NOTE — TELEPHONE ENCOUNTER
Chart reviewed  vanc trough 21.6  On [id number]  D[facility] with [facility] at [facility]  Will hold vancomycin tonight, redose [id number] and recheck trough Friday morning.  creatinine 1.4 today

## 2020-10-16 ENCOUNTER — OFFICE VISIT (OUTPATIENT)
Dept: PODIATRY | Facility: CLINIC | Age: 50
End: 2020-10-16
Payer: MEDICAID

## 2020-10-16 VITALS
BODY MASS INDEX: 31.14 KG/M2 | DIASTOLIC BLOOD PRESSURE: 104 MMHG | SYSTOLIC BLOOD PRESSURE: 191 MMHG | TEMPERATURE: 98 F | HEIGHT: 73 IN | RESPIRATION RATE: 18 BRPM | HEART RATE: 81 BPM | WEIGHT: 235 LBS

## 2020-10-16 DIAGNOSIS — M86.272 SUBACUTE OSTEOMYELITIS OF LEFT FOOT: Primary | ICD-10-CM

## 2020-10-16 DIAGNOSIS — E11.621 DIABETIC ULCER OF LEFT MIDFOOT ASSOCIATED WITH TYPE 2 DIABETES MELLITUS, WITH BONE INVOLVEMENT WITHOUT EVIDENCE OF NECROSIS: ICD-10-CM

## 2020-10-16 DIAGNOSIS — L97.426 DIABETIC ULCER OF LEFT MIDFOOT ASSOCIATED WITH TYPE 2 DIABETES MELLITUS, WITH BONE INVOLVEMENT WITHOUT EVIDENCE OF NECROSIS: ICD-10-CM

## 2020-10-16 PROCEDURE — 99999 PR PBB SHADOW E&M-EST. PATIENT-LVL IV: CPT | Mod: PBBFAC,,, | Performed by: PODIATRIST

## 2020-10-16 PROCEDURE — 99499 NO LOS: ICD-10-PCS | Mod: S$PBB,,, | Performed by: PODIATRIST

## 2020-10-16 PROCEDURE — 11042 DBRDMT SUBQ TIS 1ST 20SQCM/<: CPT | Mod: PBBFAC | Performed by: PODIATRIST

## 2020-10-16 PROCEDURE — 11042 DBRDMT SUBQ TIS 1ST 20SQCM/<: CPT | Mod: S$PBB,,, | Performed by: PODIATRIST

## 2020-10-16 PROCEDURE — 99214 OFFICE O/P EST MOD 30 MIN: CPT | Mod: PBBFAC,25 | Performed by: PODIATRIST

## 2020-10-16 PROCEDURE — 99499 UNLISTED E&M SERVICE: CPT | Mod: S$PBB,,, | Performed by: PODIATRIST

## 2020-10-16 PROCEDURE — 99999 PR PBB SHADOW E&M-EST. PATIENT-LVL IV: ICD-10-PCS | Mod: PBBFAC,,, | Performed by: PODIATRIST

## 2020-10-16 PROCEDURE — 11042 PR DEBRIDEMENT, SKIN, SUB-Q TISSUE,=<20 SQ CM: ICD-10-PCS | Mod: S$PBB,,, | Performed by: PODIATRIST

## 2020-10-16 NOTE — PROGRESS NOTES
Subjective:      Patient ID: Indio Ryder Jr. is a 50 y.o. male.    Chief Complaint: Follow-up (wound care ), Foot Ulcer (left foot ), and Dressing Change (football )    Indio is a 50 y.o. male who presents to the clinic for evaluation and treatment of high risk feet. 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 The patient's chief complaint is diabetic foot ulcer, L . Pt has been seeing Dr. Almonte weekly. Patient has been in football dressing, ambulating in wedge Darco shoe x 1 week with out issues . Pt relates he was able to keep the dressing on and dry with no issues.     Pt relates he got picc line and is doing the abx with no issues; seeing ID today as well.     Pt relates his bp is high today in clinic because he was taken off his bp medication due to hypotension. No dizziness or other complaints.         10.16.20- Patient has been in football dressing, ambulating in Darco shoe x 1 week with out issues  No issues w/ IV abx   PCP: Lorena Thakur MD    Date Last Seen by PCP: 7/28/20  Chief Complaint   Patient presents with    Follow-up     wound care     Foot Ulcer     left foot     Dressing Change     football          Hemoglobin A1C   Date Value Ref Range Status   06/12/2020 7.2 (H) 4.0 - 5.6 % Final     Comment:     ADA Screening Guidelines:  5.7-6.4%  Consistent with prediabetes  >or=6.5%  Consistent  "with diabetes  High levels of fetal hemoglobin interfere with the HbA1C  assay. Heterozygous hemoglobin variants (HbS, HgC, etc)do  not significantly interfere with this assay.   However, presence of multiple variants may affect accuracy.     03/12/2020 12.0 (H) 4.0 - 5.6 % Final     Comment:     ADA Screening Guidelines:  5.7-6.4%  Consistent with prediabetes  >or=6.5%  Consistent with diabetes  High levels of fetal hemoglobin interfere with the HbA1C  assay. Heterozygous hemoglobin variants (HbS, HgC, etc)do  not significantly interfere with this assay.   However, presence of multiple variants may affect accuracy.     12/03/2019 8.4 (H) 4.0 - 5.6 % Final     Comment:     ADA Screening Guidelines:  5.7-6.4%  Consistent with prediabetes  >or=6.5%  Consistent with diabetes  High levels of fetal hemoglobin interfere with the HbA1C  assay. Heterozygous hemoglobin variants (HbS, HgC, etc)do  not significantly interfere with this assay.   However, presence of multiple variants may affect accuracy.         Review of Systems   Constitution: Negative for chills, decreased appetite, diaphoresis, fever, malaise/fatigue, night sweats, weight gain and weight loss.   Cardiovascular: Negative for leg swelling.   Respiratory: Negative for cough.    Skin: Positive for dry skin and poor wound healing. Negative for flushing, itching and rash.   Musculoskeletal: Positive for neck pain. Negative for arthritis, joint pain, joint swelling and myalgias.   Gastrointestinal: Negative for nausea and vomiting.   Neurological: Positive for numbness. Negative for loss of balance and paresthesias.           Objective:       Vitals:    10/16/20 0921   BP: (!) 191/104   Pulse: 81   Resp: 18   Temp: 97.6 °F (36.4 °C)   TempSrc: Axillary   Weight: 106.6 kg (235 lb)   Height: 6' 1" (1.854 m)   PainSc: 0-No pain        Physical Exam  Vitals signs reviewed.   Constitutional:       General: He is not in acute distress.     Appearance: He is " well-developed. He is not ill-appearing, toxic-appearing or diaphoretic.   Cardiovascular:      Pulses:           Dorsalis pedis pulses are 2+ on the left side.        Posterior tibial pulses are 1+ on the left side.      Comments: dorsalis pedis and posterior tibial pulses are palpable  + pedal hair growth         Musculoskeletal:         General: Swelling (stable ) present. No tenderness.      Right lower le+ Edema present.      Left lower le+ Edema present.      Left foot: Decreased range of motion. Deformity and prominent metatarsal heads present.      Comments: No pop or with debridement       Feet:      Right foot:      Protective Sensation: 0 sites tested. 0 sites sensed.      Left foot:      Protective Sensation: 0 sites tested. 5 sites sensed.      Skin integrity: Ulcer present.   Skin:     General: Skin is warm and dry.      Findings: No erythema, lesion or rash.      Comments: Ulcer location:  left, plantar foot   Pre debridement Measurements: 0.5x0.5x0.1cm  Post debridement Measurements : 0.5x0.7x0.2 cm   Signs of infection: No  Erythema: No  Undermining: No  Tunneling: No  Drainage: Serosanguineous  Periwound skin: intact  Wound Base: granular       Neurological:      Mental Status: He is alert and oriented to person, place, and time.      Gait: Gait abnormal.   Psychiatric:         Attention and Perception: Attention normal.         Mood and Affect: Mood normal.         Speech: Speech normal.         Behavior: Behavior normal. Behavior is cooperative.         Thought Content: Thought content normal.         Cognition and Memory: Cognition normal.         Judgment: Judgment normal.                               Assessment:       Encounter Diagnoses   Name Primary?    Subacute osteomyelitis of left foot Yes    Diabetic ulcer of left midfoot associated with type 2 diabetes mellitus, with bone involvement without evidence of necrosis          Plan:       Indio was seen today for follow-up, foot  ulcer and dressing change.    Diagnoses and all orders for this visit:    Subacute osteomyelitis of left foot    Diabetic ulcer of left midfoot associated with type 2 diabetes mellitus, with bone involvement without evidence of necrosis      I counseled the patient on his conditions, their implications and medical management.    No issues w/ IV abx     Debridement: With verbal consent, nonviable tissues on the left foot were debrided beyond sub q utilizing a  sterile No. 3 scalpel and forceps. Minimal bleeding controlled with direct pressure  The patient tolerated this well.     Dressings: [place]  Offloading:Foam    Follow-up:Patient is to return to the clinic in 1 week  for follow-up but should call [facility] immediately if any signs of infection, such as fever, chills, sweats, increased redness or pain.    Short-term goals include maintaining good offloading and minimizing bioburden, promoting granulation and epithelialization to healing.  Long-term goals include keeping the wound healed by good offloading and medical management under the direction of internist.

## 2020-10-19 ENCOUNTER — LAB VISIT (OUTPATIENT)
Dept: LAB | Facility: HOSPITAL | Age: 50
End: 2020-10-19
Attending: PHYSICIAN ASSISTANT
Payer: MEDICAID

## 2020-10-19 DIAGNOSIS — L70.9 SAPHO SYNDROME: Primary | ICD-10-CM

## 2020-10-19 DIAGNOSIS — M65.9 SAPHO SYNDROME: Primary | ICD-10-CM

## 2020-10-19 DIAGNOSIS — M85.80 SAPHO SYNDROME: Primary | ICD-10-CM

## 2020-10-19 DIAGNOSIS — M86.9 SAPHO SYNDROME: Primary | ICD-10-CM

## 2020-10-19 DIAGNOSIS — L40.3 SAPHO SYNDROME: Primary | ICD-10-CM

## 2020-10-19 LAB
ALBUMIN SERPL BCP-MCNC: 3.3 G/DL (ref 3.5–5.2)
ALP SERPL-CCNC: 161 U/L (ref 55–135)
ALT SERPL W/O P-5'-P-CCNC: 12 U/L (ref 10–44)
ANION GAP SERPL CALC-SCNC: 12 MMOL/L (ref 8–16)
AST SERPL-CCNC: 17 U/L (ref 10–40)
BASOPHILS # BLD AUTO: 0.02 K/UL (ref 0–0.2)
BASOPHILS NFR BLD: 0.3 % (ref 0–1.9)
BILIRUB SERPL-MCNC: 0.4 MG/DL (ref 0.1–1)
BUN SERPL-MCNC: 12 MG/DL (ref 6–20)
CALCIUM SERPL-MCNC: 9.2 MG/DL (ref 8.7–10.5)
CHLORIDE SERPL-SCNC: 104 MMOL/L (ref 95–110)
CO2 SERPL-SCNC: 25 MMOL/L (ref 23–29)
CREAT SERPL-MCNC: 1.4 MG/DL (ref 0.5–1.4)
CRP SERPL-MCNC: 1.6 MG/L (ref 0–8.2)
DIFFERENTIAL METHOD: ABNORMAL
EOSINOPHIL # BLD AUTO: 0.2 K/UL (ref 0–0.5)
EOSINOPHIL NFR BLD: 3.5 % (ref 0–8)
ERYTHROCYTE [DISTWIDTH] IN BLOOD BY AUTOMATED COUNT: 14.2 % (ref 11.5–14.5)
ERYTHROCYTE [SEDIMENTATION RATE] IN BLOOD BY WESTERGREN METHOD: 99 MM/HR (ref 0–23)
EST. GFR  (AFRICAN AMERICAN): >60 ML/MIN/1.73 M^2
EST. GFR  (NON AFRICAN AMERICAN): 57.4 ML/MIN/1.73 M^2
GLUCOSE SERPL-MCNC: 294 MG/DL (ref 70–110)
HCT VFR BLD AUTO: 36 % (ref 40–54)
HGB BLD-MCNC: 10.9 G/DL (ref 14–18)
IMM GRANULOCYTES # BLD AUTO: 0.01 K/UL (ref 0–0.04)
IMM GRANULOCYTES NFR BLD AUTO: 0.2 % (ref 0–0.5)
LYMPHOCYTES # BLD AUTO: 1.6 K/UL (ref 1–4.8)
LYMPHOCYTES NFR BLD: 27.1 % (ref 18–48)
MCH RBC QN AUTO: 28.8 PG (ref 27–31)
MCHC RBC AUTO-ENTMCNC: 30.3 G/DL (ref 32–36)
MCV RBC AUTO: 95 FL (ref 82–98)
MONOCYTES # BLD AUTO: 0.5 K/UL (ref 0.3–1)
MONOCYTES NFR BLD: 8.1 % (ref 4–15)
NEUTROPHILS # BLD AUTO: 3.5 K/UL (ref 1.8–7.7)
NEUTROPHILS NFR BLD: 60.8 % (ref 38–73)
NRBC BLD-RTO: 0 /100 WBC
PLATELET # BLD AUTO: 230 K/UL (ref 150–350)
PMV BLD AUTO: 12 FL (ref 9.2–12.9)
POTASSIUM SERPL-SCNC: 3.3 MMOL/L (ref 3.5–5.1)
PROT SERPL-MCNC: 7.2 G/DL (ref 6–8.4)
RBC # BLD AUTO: 3.78 M/UL (ref 4.6–6.2)
SODIUM SERPL-SCNC: 141 MMOL/L (ref 136–145)
VANCOMYCIN TROUGH SERPL-MCNC: 15.8 UG/ML (ref 10–22)
WBC # BLD AUTO: 5.79 K/UL (ref 3.9–12.7)

## 2020-10-19 PROCEDURE — 80053 COMPREHEN METABOLIC PANEL: CPT

## 2020-10-19 PROCEDURE — 85025 COMPLETE CBC W/AUTO DIFF WBC: CPT

## 2020-10-19 PROCEDURE — 85652 RBC SED RATE AUTOMATED: CPT

## 2020-10-19 PROCEDURE — 86140 C-REACTIVE PROTEIN: CPT

## 2020-10-19 PROCEDURE — 80202 ASSAY OF VANCOMYCIN: CPT

## 2020-10-20 ENCOUNTER — TELEPHONE (OUTPATIENT)
Dept: INFECTIOUS DISEASES | Facility: HOSPITAL | Age: 50
End: 2020-10-20

## 2020-10-20 NOTE — TELEPHONE ENCOUNTER
Vanc level 15.8 (therapeutic) on Vanc 1 g IV q 12 hours. ESR trending down. CRP has normalized.  Will continue Vanc at same dosing.

## 2020-10-21 ENCOUNTER — PATIENT MESSAGE (OUTPATIENT)
Dept: PODIATRY | Facility: CLINIC | Age: 50
End: 2020-10-21

## 2020-10-23 ENCOUNTER — OFFICE VISIT (OUTPATIENT)
Dept: PODIATRY | Facility: CLINIC | Age: 50
End: 2020-10-23
Payer: MEDICAID

## 2020-10-23 VITALS
DIASTOLIC BLOOD PRESSURE: 110 MMHG | HEART RATE: 101 BPM | HEIGHT: 73 IN | WEIGHT: 235 LBS | SYSTOLIC BLOOD PRESSURE: 181 MMHG | BODY MASS INDEX: 31.14 KG/M2

## 2020-10-23 DIAGNOSIS — E11.621 DIABETIC ULCER OF LEFT MIDFOOT ASSOCIATED WITH TYPE 2 DIABETES MELLITUS, WITH BONE INVOLVEMENT WITHOUT EVIDENCE OF NECROSIS: ICD-10-CM

## 2020-10-23 DIAGNOSIS — Z79.4 TYPE 2 DIABETES MELLITUS WITH HYPERGLYCEMIA, WITH LONG-TERM CURRENT USE OF INSULIN: ICD-10-CM

## 2020-10-23 DIAGNOSIS — E11.65 TYPE 2 DIABETES MELLITUS WITH HYPERGLYCEMIA, WITH LONG-TERM CURRENT USE OF INSULIN: ICD-10-CM

## 2020-10-23 DIAGNOSIS — L97.426 DIABETIC ULCER OF LEFT MIDFOOT ASSOCIATED WITH TYPE 2 DIABETES MELLITUS, WITH BONE INVOLVEMENT WITHOUT EVIDENCE OF NECROSIS: ICD-10-CM

## 2020-10-23 DIAGNOSIS — M86.272 SUBACUTE OSTEOMYELITIS OF LEFT FOOT: Primary | ICD-10-CM

## 2020-10-23 PROCEDURE — 99499 UNLISTED E&M SERVICE: CPT | Mod: S$PBB,,, | Performed by: PODIATRIST

## 2020-10-23 PROCEDURE — 11042 PR DEBRIDEMENT, SKIN, SUB-Q TISSUE,=<20 SQ CM: ICD-10-PCS | Mod: S$PBB,,, | Performed by: PODIATRIST

## 2020-10-23 PROCEDURE — 99999 PR PBB SHADOW E&M-EST. PATIENT-LVL III: CPT | Mod: PBBFAC,,, | Performed by: PODIATRIST

## 2020-10-23 PROCEDURE — 99213 OFFICE O/P EST LOW 20 MIN: CPT | Mod: PBBFAC | Performed by: PODIATRIST

## 2020-10-23 PROCEDURE — 11042 DBRDMT SUBQ TIS 1ST 20SQCM/<: CPT | Mod: S$PBB,,, | Performed by: PODIATRIST

## 2020-10-23 PROCEDURE — 99499 NO LOS: ICD-10-PCS | Mod: S$PBB,,, | Performed by: PODIATRIST

## 2020-10-23 PROCEDURE — 11042 DBRDMT SUBQ TIS 1ST 20SQCM/<: CPT | Mod: PBBFAC | Performed by: PODIATRIST

## 2020-10-23 PROCEDURE — 99999 PR PBB SHADOW E&M-EST. PATIENT-LVL III: ICD-10-PCS | Mod: PBBFAC,,, | Performed by: PODIATRIST

## 2020-10-23 NOTE — PROGRESS NOTES
Subjective:      Patient ID: Indio Ryder Jr. is a 50 y.o. male.    Chief Complaint: Wound Check (left foot )    Indio is a 50 y.o. male who presents to the clinic for evaluation and treatment of high risk feet. 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 The patient's chief complaint is diabetic foot ulcer, L . Pt has been seeing Dr. Almonte weekly. Patient has been in football dressing, ambulating in wedge Darco shoe x 1 week with out issues . Pt relates he was able to keep the dressing on and dry with no issues.     Pt relates he got picc line and is doing the abx with no issues; seeing ID today as well.     Pt relates his bp is high today in clinic because he was taken off his bp medication due to hypotension. No dizziness or other complaints.         10.16.20- Patient has been in football dressing, ambulating in Darco shoe x 1 week with out issues  No issues w/ IV abx     10.23.20- no issues w/ FB dressing       PCP: Lorena Thakur MD    Date Last Seen by PCP: 7/28/20  Chief Complaint   Patient presents with    Wound Check     left foot          Hemoglobin A1C   Date Value Ref Range Status   06/12/2020 7.2 (H) 4.0 - 5.6 % Final     Comment:     ADA Screening Guidelines:  5.7-6.4%  Consistent with prediabetes  >or=6.5%  Consistent with diabetes  High levels of fetal hemoglobin interfere with the  "HbA1C  assay. Heterozygous hemoglobin variants (HbS, HgC, etc)do  not significantly interfere with this assay.   However, presence of multiple variants may affect accuracy.     03/12/2020 12.0 (H) 4.0 - 5.6 % Final     Comment:     ADA Screening Guidelines:  5.7-6.4%  Consistent with prediabetes  >or=6.5%  Consistent with diabetes  High levels of fetal hemoglobin interfere with the HbA1C  assay. Heterozygous hemoglobin variants (HbS, HgC, etc)do  not significantly interfere with this assay.   However, presence of multiple variants may affect accuracy.     12/03/2019 8.4 (H) 4.0 - 5.6 % Final     Comment:     ADA Screening Guidelines:  5.7-6.4%  Consistent with prediabetes  >or=6.5%  Consistent with diabetes  High levels of fetal hemoglobin interfere with the HbA1C  assay. Heterozygous hemoglobin variants (HbS, HgC, etc)do  not significantly interfere with this assay.   However, presence of multiple variants may affect accuracy.         Review of Systems   Constitution: Negative for chills, decreased appetite, diaphoresis, fever, malaise/fatigue, night sweats, weight gain and weight loss.   Cardiovascular: Negative for leg swelling.   Respiratory: Negative for cough.    Skin: Positive for dry skin and poor wound healing. Negative for flushing, itching and rash.   Musculoskeletal: Positive for neck pain. Negative for arthritis, joint pain, joint swelling and myalgias.   Gastrointestinal: Negative for nausea and vomiting.   Neurological: Positive for numbness. Negative for loss of balance and paresthesias.           Objective:       Vitals:    10/23/20 0844   BP: (!) 181/110   Pulse: 101   Weight: 106.6 kg (235 lb 0.2 oz)   Height: 6' 1" (1.854 m)   PainSc: 0-No pain        Physical Exam  Vitals signs reviewed.   Constitutional:       General: He is not in acute distress.     Appearance: He is well-developed. He is not ill-appearing, toxic-appearing or diaphoretic.   Cardiovascular:      Pulses:           Dorsalis " pedis pulses are 2+ on the left side.        Posterior tibial pulses are 1+ on the left side.      Comments: dorsalis pedis and posterior tibial pulses are palpable  + pedal hair growth         Musculoskeletal:         General: Swelling (stable ) present. No tenderness.      Right lower le+ Edema present.      Left lower le+ Edema present.      Left foot: Decreased range of motion. Deformity and prominent metatarsal heads present.      Comments: No pop or with debridement       Feet:      Right foot:      Protective Sensation: 0 sites tested. 0 sites sensed.      Left foot:      Protective Sensation: 0 sites tested. 5 sites sensed.      Skin integrity: Ulcer present.   Skin:     General: Skin is warm and dry.      Findings: No erythema, lesion or rash.      Comments: Ulcer location:  left, plantar foot   Pre debridement Measurements: 0.4x0.2x0.1cm  Post debridement Measurements : 0.6x0.3x0.1 cm   Signs of infection: No  Erythema: No  Undermining: No  Tunneling: No  Drainage: Serosanguineous  Periwound skin: intact  Wound Base: granular       Neurological:      Mental Status: He is alert and oriented to person, place, and time.      Gait: Gait abnormal.   Psychiatric:         Attention and Perception: Attention normal.         Mood and Affect: Mood normal.         Speech: Speech normal.         Behavior: Behavior normal. Behavior is cooperative.         Thought Content: Thought content normal.         Cognition and Memory: Cognition normal.         Judgment: Judgment normal.                                   Assessment:       Encounter Diagnoses   Name Primary?    Subacute osteomyelitis of left foot Yes    Diabetic ulcer of left midfoot associated with type 2 diabetes mellitus, with bone involvement without evidence of necrosis     Type 2 diabetes mellitus with hyperglycemia, with long-term current use of insulin          Plan:       Indio was seen today for wound check.    Diagnoses and all orders for  this visit:    Subacute osteomyelitis of left foot    Diabetic ulcer of left midfoot associated with type 2 diabetes mellitus, with bone involvement without evidence of necrosis    Type 2 diabetes mellitus with hyperglycemia, with long-term current use of insulin      I counseled the patient on his conditions, their implications and medical management.    No issues w/ IV abx     Debridement: With verbal consent, nonviable tissues on the left foot were debrided beyond sub q utilizing a  sterile No. 3 scalpel and forceps. Minimal bleeding controlled with direct pressure  The patient tolerated this well.     Dressings: [place]  Offloading:Foam    Follow-up:Patient is to return to the clinic in 1 week  for follow-up but should call [facility] immediately if any signs of infection, such as fever, chills, sweats, increased redness or pain.    Short-term goals include maintaining good offloading and minimizing bioburden, promoting granulation and epithelialization to healing.  Long-term goals include keeping the wound healed by good offloading and medical management under the direction of internist.

## 2020-10-26 ENCOUNTER — LAB VISIT (OUTPATIENT)
Dept: LAB | Facility: HOSPITAL | Age: 50
End: 2020-10-26
Attending: PHYSICIAN ASSISTANT
Payer: MEDICAID

## 2020-10-26 DIAGNOSIS — M65.9 SYNOVITIS AND TENOSYNOVITIS, UNSPECIFIED: Primary | ICD-10-CM

## 2020-10-26 PROCEDURE — 36415 COLL VENOUS BLD VENIPUNCTURE: CPT

## 2020-10-26 PROCEDURE — 85025 COMPLETE CBC W/AUTO DIFF WBC: CPT

## 2020-10-26 PROCEDURE — 85652 RBC SED RATE AUTOMATED: CPT

## 2020-10-26 PROCEDURE — 86140 C-REACTIVE PROTEIN: CPT

## 2020-10-26 PROCEDURE — 80202 ASSAY OF VANCOMYCIN: CPT

## 2020-10-26 PROCEDURE — 80053 COMPREHEN METABOLIC PANEL: CPT

## 2020-10-27 LAB
ALBUMIN SERPL BCP-MCNC: 3.5 G/DL (ref 3.5–5.2)
ALP SERPL-CCNC: 159 U/L (ref 55–135)
ALT SERPL W/O P-5'-P-CCNC: 24 U/L (ref 10–44)
ANION GAP SERPL CALC-SCNC: 16 MMOL/L (ref 8–16)
AST SERPL-CCNC: 29 U/L (ref 10–40)
BASOPHILS # BLD AUTO: 0.03 K/UL (ref 0–0.2)
BASOPHILS NFR BLD: 0.7 % (ref 0–1.9)
BILIRUB SERPL-MCNC: 0.4 MG/DL (ref 0.1–1)
BUN SERPL-MCNC: 13 MG/DL (ref 6–20)
CALCIUM SERPL-MCNC: 9 MG/DL (ref 8.7–10.5)
CHLORIDE SERPL-SCNC: 100 MMOL/L (ref 95–110)
CO2 SERPL-SCNC: 21 MMOL/L (ref 23–29)
CREAT SERPL-MCNC: 1.4 MG/DL (ref 0.5–1.4)
CRP SERPL-MCNC: 0.6 MG/L (ref 0–8.2)
DIFFERENTIAL METHOD: ABNORMAL
EOSINOPHIL # BLD AUTO: 0.1 K/UL (ref 0–0.5)
EOSINOPHIL NFR BLD: 2.7 % (ref 0–8)
ERYTHROCYTE [DISTWIDTH] IN BLOOD BY AUTOMATED COUNT: 14.2 % (ref 11.5–14.5)
ERYTHROCYTE [SEDIMENTATION RATE] IN BLOOD BY WESTERGREN METHOD: 69 MM/HR (ref 0–23)
EST. GFR  (AFRICAN AMERICAN): >60 ML/MIN/1.73 M^2
EST. GFR  (NON AFRICAN AMERICAN): 57.4 ML/MIN/1.73 M^2
GLUCOSE SERPL-MCNC: 529 MG/DL (ref 70–110)
HCT VFR BLD AUTO: 39.2 % (ref 40–54)
HGB BLD-MCNC: 11.2 G/DL (ref 14–18)
IMM GRANULOCYTES # BLD AUTO: 0.01 K/UL (ref 0–0.04)
IMM GRANULOCYTES NFR BLD AUTO: 0.2 % (ref 0–0.5)
LYMPHOCYTES # BLD AUTO: 1.4 K/UL (ref 1–4.8)
LYMPHOCYTES NFR BLD: 30.1 % (ref 18–48)
MCH RBC QN AUTO: 28 PG (ref 27–31)
MCHC RBC AUTO-ENTMCNC: 28.6 G/DL (ref 32–36)
MCV RBC AUTO: 98 FL (ref 82–98)
MONOCYTES # BLD AUTO: 0.3 K/UL (ref 0.3–1)
MONOCYTES NFR BLD: 7.6 % (ref 4–15)
NEUTROPHILS # BLD AUTO: 2.6 K/UL (ref 1.8–7.7)
NEUTROPHILS NFR BLD: 58.7 % (ref 38–73)
NRBC BLD-RTO: 0 /100 WBC
PLATELET # BLD AUTO: 244 K/UL (ref 150–350)
PMV BLD AUTO: 11.7 FL (ref 9.2–12.9)
POTASSIUM SERPL-SCNC: 4 MMOL/L (ref 3.5–5.1)
PROT SERPL-MCNC: 7.3 G/DL (ref 6–8.4)
RBC # BLD AUTO: 4 M/UL (ref 4.6–6.2)
SODIUM SERPL-SCNC: 137 MMOL/L (ref 136–145)
VANCOMYCIN TROUGH SERPL-MCNC: 11.7 UG/ML (ref 10–22)
WBC # BLD AUTO: 4.48 K/UL (ref 3.9–12.7)

## 2020-10-30 DIAGNOSIS — E11.9 TYPE 2 DIABETES MELLITUS WITHOUT COMPLICATION: ICD-10-CM

## 2020-11-02 ENCOUNTER — PATIENT MESSAGE (OUTPATIENT)
Dept: PODIATRY | Facility: CLINIC | Age: 50
End: 2020-11-02

## 2020-11-02 ENCOUNTER — LAB VISIT (OUTPATIENT)
Dept: LAB | Facility: HOSPITAL | Age: 50
End: 2020-11-02
Attending: PHYSICIAN ASSISTANT
Payer: MEDICAID

## 2020-11-02 DIAGNOSIS — M85.80 SAPHO SYNDROME: Primary | ICD-10-CM

## 2020-11-02 DIAGNOSIS — L70.9 SAPHO SYNDROME: Primary | ICD-10-CM

## 2020-11-02 DIAGNOSIS — L40.3 SAPHO SYNDROME: Primary | ICD-10-CM

## 2020-11-02 DIAGNOSIS — M86.9 SAPHO SYNDROME: Primary | ICD-10-CM

## 2020-11-02 DIAGNOSIS — M65.9 SAPHO SYNDROME: Primary | ICD-10-CM

## 2020-11-02 LAB
ALBUMIN SERPL BCP-MCNC: 4 G/DL (ref 3.5–5.2)
ALP SERPL-CCNC: 156 U/L (ref 55–135)
ALT SERPL W/O P-5'-P-CCNC: 29 U/L (ref 10–44)
ANION GAP SERPL CALC-SCNC: 13 MMOL/L (ref 8–16)
AST SERPL-CCNC: 31 U/L (ref 10–40)
BASOPHILS # BLD AUTO: 0.02 K/UL (ref 0–0.2)
BASOPHILS NFR BLD: 0.4 % (ref 0–1.9)
BILIRUB SERPL-MCNC: 0.5 MG/DL (ref 0.1–1)
BUN SERPL-MCNC: 12 MG/DL (ref 6–20)
CALCIUM SERPL-MCNC: 10.3 MG/DL (ref 8.7–10.5)
CHLORIDE SERPL-SCNC: 104 MMOL/L (ref 95–110)
CO2 SERPL-SCNC: 26 MMOL/L (ref 23–29)
CREAT SERPL-MCNC: 1.3 MG/DL (ref 0.5–1.4)
CRP SERPL-MCNC: 0.8 MG/L (ref 0–8.2)
DIFFERENTIAL METHOD: ABNORMAL
EOSINOPHIL # BLD AUTO: 0.2 K/UL (ref 0–0.5)
EOSINOPHIL NFR BLD: 3 % (ref 0–8)
ERYTHROCYTE [DISTWIDTH] IN BLOOD BY AUTOMATED COUNT: 14 % (ref 11.5–14.5)
ERYTHROCYTE [SEDIMENTATION RATE] IN BLOOD BY WESTERGREN METHOD: 83 MM/HR (ref 0–23)
EST. GFR  (AFRICAN AMERICAN): >60 ML/MIN/1.73 M^2
EST. GFR  (NON AFRICAN AMERICAN): >60 ML/MIN/1.73 M^2
GLUCOSE SERPL-MCNC: 110 MG/DL (ref 70–110)
HCT VFR BLD AUTO: 40.5 % (ref 40–54)
HGB BLD-MCNC: 12.5 G/DL (ref 14–18)
IMM GRANULOCYTES # BLD AUTO: 0.01 K/UL (ref 0–0.04)
IMM GRANULOCYTES NFR BLD AUTO: 0.2 % (ref 0–0.5)
LYMPHOCYTES # BLD AUTO: 1.6 K/UL (ref 1–4.8)
LYMPHOCYTES NFR BLD: 27.6 % (ref 18–48)
MCH RBC QN AUTO: 28.9 PG (ref 27–31)
MCHC RBC AUTO-ENTMCNC: 30.9 G/DL (ref 32–36)
MCV RBC AUTO: 94 FL (ref 82–98)
MONOCYTES # BLD AUTO: 0.4 K/UL (ref 0.3–1)
MONOCYTES NFR BLD: 7.3 % (ref 4–15)
NEUTROPHILS # BLD AUTO: 3.5 K/UL (ref 1.8–7.7)
NEUTROPHILS NFR BLD: 61.5 % (ref 38–73)
NRBC BLD-RTO: 0 /100 WBC
PLATELET # BLD AUTO: 230 K/UL (ref 150–350)
PMV BLD AUTO: 12.4 FL (ref 9.2–12.9)
POTASSIUM SERPL-SCNC: 3.5 MMOL/L (ref 3.5–5.1)
PROT SERPL-MCNC: 8.1 G/DL (ref 6–8.4)
RBC # BLD AUTO: 4.32 M/UL (ref 4.6–6.2)
SODIUM SERPL-SCNC: 143 MMOL/L (ref 136–145)
VANCOMYCIN TROUGH SERPL-MCNC: 15.2 UG/ML (ref 10–22)
WBC # BLD AUTO: 5.61 K/UL (ref 3.9–12.7)

## 2020-11-02 PROCEDURE — 80053 COMPREHEN METABOLIC PANEL: CPT

## 2020-11-02 PROCEDURE — 80202 ASSAY OF VANCOMYCIN: CPT

## 2020-11-02 PROCEDURE — 85652 RBC SED RATE AUTOMATED: CPT

## 2020-11-02 PROCEDURE — 85025 COMPLETE CBC W/AUTO DIFF WBC: CPT

## 2020-11-02 PROCEDURE — 86140 C-REACTIVE PROTEIN: CPT

## 2020-11-03 ENCOUNTER — OFFICE VISIT (OUTPATIENT)
Dept: PODIATRY | Facility: CLINIC | Age: 50
End: 2020-11-03
Payer: MEDICAID

## 2020-11-03 VITALS
HEART RATE: 92 BPM | WEIGHT: 235 LBS | HEIGHT: 73 IN | DIASTOLIC BLOOD PRESSURE: 98 MMHG | SYSTOLIC BLOOD PRESSURE: 156 MMHG | BODY MASS INDEX: 31.14 KG/M2 | RESPIRATION RATE: 18 BRPM

## 2020-11-03 DIAGNOSIS — Z79.4 TYPE 2 DIABETES MELLITUS WITH HYPERGLYCEMIA, WITH LONG-TERM CURRENT USE OF INSULIN: Primary | ICD-10-CM

## 2020-11-03 DIAGNOSIS — E11.65 TYPE 2 DIABETES MELLITUS WITH HYPERGLYCEMIA, WITH LONG-TERM CURRENT USE OF INSULIN: Primary | ICD-10-CM

## 2020-11-03 DIAGNOSIS — L97.426 DIABETIC ULCER OF LEFT MIDFOOT ASSOCIATED WITH TYPE 2 DIABETES MELLITUS, WITH BONE INVOLVEMENT WITHOUT EVIDENCE OF NECROSIS: ICD-10-CM

## 2020-11-03 DIAGNOSIS — M86.272 SUBACUTE OSTEOMYELITIS OF LEFT FOOT: ICD-10-CM

## 2020-11-03 DIAGNOSIS — E11.621 DIABETIC ULCER OF LEFT MIDFOOT ASSOCIATED WITH TYPE 2 DIABETES MELLITUS, WITH BONE INVOLVEMENT WITHOUT EVIDENCE OF NECROSIS: ICD-10-CM

## 2020-11-03 PROCEDURE — 11042 DBRDMT SUBQ TIS 1ST 20SQCM/<: CPT | Mod: S$PBB,,, | Performed by: PODIATRIST

## 2020-11-03 PROCEDURE — 99499 UNLISTED E&M SERVICE: CPT | Mod: S$PBB,,, | Performed by: PODIATRIST

## 2020-11-03 PROCEDURE — 99499 NO LOS: ICD-10-PCS | Mod: S$PBB,,, | Performed by: PODIATRIST

## 2020-11-03 PROCEDURE — 99999 PR PBB SHADOW E&M-EST. PATIENT-LVL III: ICD-10-PCS | Mod: PBBFAC,,, | Performed by: PODIATRIST

## 2020-11-03 PROCEDURE — 11042 DBRDMT SUBQ TIS 1ST 20SQCM/<: CPT | Mod: PBBFAC | Performed by: PODIATRIST

## 2020-11-03 PROCEDURE — 11042 PR DEBRIDEMENT, SKIN, SUB-Q TISSUE,=<20 SQ CM: ICD-10-PCS | Mod: S$PBB,,, | Performed by: PODIATRIST

## 2020-11-03 PROCEDURE — 99999 PR PBB SHADOW E&M-EST. PATIENT-LVL III: CPT | Mod: PBBFAC,,, | Performed by: PODIATRIST

## 2020-11-03 PROCEDURE — 99213 OFFICE O/P EST LOW 20 MIN: CPT | Mod: PBBFAC | Performed by: PODIATRIST

## 2020-11-06 NOTE — PROGRESS NOTES
Subjective:      Patient ID: Indio Ryder Jr. is a 50 y.o. male.    Chief Complaint: Follow-up (wound check ), Foot Ulcer (left foot ), and Dressing Change    Indio is a 50 y.o. male who presents to the clinic for evaluation and treatment of high risk feet. 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 The patient's chief complaint is diabetic foot ulcer, L . Pt has been seeing Dr. Almonte weekly. Patient has been in football dressing, ambulating in wedge Darco shoe x 1 week with out issues . Pt relates he was able to keep the dressing on and dry with no issues.     Pt relates he got picc line and is doing the abx with no issues; seeing ID today as well.     Pt relates his bp is high today in clinic because he was taken off his bp medication due to hypotension. No dizziness or other complaints.         10.16.20- Patient has been in football dressing, ambulating in Darco shoe x 1 week with out issues  No issues w/ IV abx     10.23.20- no issues w/ FB dressing     11/./20- no issues w/ FB. Happy w/ new walking boot     PCP: Lorena Thakur MD    Date Last Seen by PCP: 7/28/20  Chief Complaint   Patient presents with    Follow-up     wound check     Foot Ulcer     left foot     Dressing Change         Hemoglobin A1C   Date Value Ref Range Status   06/12/2020 7.2 (H) 4.0 - 5.6 % Final     Comment:     ADA Screening  "Guidelines:  5.7-6.4%  Consistent with prediabetes  >or=6.5%  Consistent with diabetes  High levels of fetal hemoglobin interfere with the HbA1C  assay. Heterozygous hemoglobin variants (HbS, HgC, etc)do  not significantly interfere with this assay.   However, presence of multiple variants may affect accuracy.     03/12/2020 12.0 (H) 4.0 - 5.6 % Final     Comment:     ADA Screening Guidelines:  5.7-6.4%  Consistent with prediabetes  >or=6.5%  Consistent with diabetes  High levels of fetal hemoglobin interfere with the HbA1C  assay. Heterozygous hemoglobin variants (HbS, HgC, etc)do  not significantly interfere with this assay.   However, presence of multiple variants may affect accuracy.     12/03/2019 8.4 (H) 4.0 - 5.6 % Final     Comment:     ADA Screening Guidelines:  5.7-6.4%  Consistent with prediabetes  >or=6.5%  Consistent with diabetes  High levels of fetal hemoglobin interfere with the HbA1C  assay. Heterozygous hemoglobin variants (HbS, HgC, etc)do  not significantly interfere with this assay.   However, presence of multiple variants may affect accuracy.         Review of Systems   Constitution: Negative for chills, decreased appetite, diaphoresis, fever, malaise/fatigue, night sweats, weight gain and weight loss.   Cardiovascular: Negative for leg swelling.   Respiratory: Negative for cough.    Skin: Positive for dry skin and poor wound healing. Negative for flushing, itching and rash.   Musculoskeletal: Positive for neck pain. Negative for arthritis, joint pain, joint swelling and myalgias.   Gastrointestinal: Negative for nausea and vomiting.   Neurological: Positive for numbness. Negative for loss of balance and paresthesias.           Objective:       Vitals:    11/03/20 1025   BP: (!) 156/98   Pulse: 92   Resp: 18   Weight: 106.6 kg (235 lb)   Height: 6' 1" (1.854 m)   PainSc: 0-No pain        Physical Exam  Vitals signs reviewed.   Constitutional:       General: He is not in acute distress.     " Appearance: He is well-developed. He is not ill-appearing, toxic-appearing or diaphoretic.   Cardiovascular:      Pulses:           Dorsalis pedis pulses are 2+ on the left side.        Posterior tibial pulses are 1+ on the left side.      Comments: dorsalis pedis and posterior tibial pulses are palpable  + pedal hair growth         Musculoskeletal:         General: Swelling (stable ) present. No tenderness.      Right lower le+ Edema present.      Left lower le+ Edema present.      Left foot: Decreased range of motion. Deformity and prominent metatarsal heads present.      Comments: No pop or with debridement       Feet:      Right foot:      Protective Sensation: 0 sites tested. 0 sites sensed.      Left foot:      Protective Sensation: 0 sites tested. 5 sites sensed.      Skin integrity: Ulcer present.   Skin:     General: Skin is warm and dry.      Findings: No erythema, lesion or rash.      Comments: Ulcer location:  left, plantar foot   Pre debridement Measurements: 0.2x0.2x0.1cm  Post debridement Measurements : 0.3x0.2x0.1 cm   Signs of infection: No  Erythema: No  Undermining: No  Tunneling: No  Drainage: Serosanguineous  Periwound skin: intact  Wound Base: granular       Neurological:      Mental Status: He is alert and oriented to person, place, and time.      Gait: Gait abnormal.   Psychiatric:         Attention and Perception: Attention normal.         Mood and Affect: Mood normal.         Speech: Speech normal.         Behavior: Behavior normal. Behavior is cooperative.         Thought Content: Thought content normal.         Cognition and Memory: Cognition normal.         Judgment: Judgment normal.                                   Assessment:       Encounter Diagnoses   Name Primary?    Type 2 diabetes mellitus with hyperglycemia, with long-term current use of insulin Yes    Subacute osteomyelitis of left foot     Diabetic ulcer of left midfoot associated with type 2 diabetes mellitus, with  bone involvement without evidence of necrosis          Plan:       Indio was seen today for follow-up, foot ulcer and dressing change.    Diagnoses and all orders for this visit:    Type 2 diabetes mellitus with hyperglycemia, with long-term current use of insulin    Subacute osteomyelitis of left foot    Diabetic ulcer of left midfoot associated with type 2 diabetes mellitus, with bone involvement without evidence of necrosis      I counseled the patient on his conditions, their implications and medical management.    No issues w/ IV abx     Debridement: With verbal consent, nonviable tissues on the left foot were debrided beyond sub q utilizing a  sterile No. 3 scalpel and forceps. Minimal bleeding controlled with direct pressure  The patient tolerated this well.     Dressings: [place]  Offloading:Foam    Follow-up:Patient is to return to the clinic in 1 week  for follow-up but should call [facility] immediately if any signs of infection, such as fever, chills, sweats, increased redness or pain.    Short-term goals include maintaining good offloading and minimizing bioburden, promoting granulation and epithelialization to healing.  Long-term goals include keeping the wound healed by good offloading and medical management under the direction of internist.

## 2020-11-09 ENCOUNTER — LAB VISIT (OUTPATIENT)
Dept: LAB | Facility: HOSPITAL | Age: 50
End: 2020-11-09
Attending: PHYSICIAN ASSISTANT
Payer: MEDICAID

## 2020-11-09 DIAGNOSIS — L40.3 SAPHO SYNDROME: Primary | ICD-10-CM

## 2020-11-09 DIAGNOSIS — M85.80 SAPHO SYNDROME: Primary | ICD-10-CM

## 2020-11-09 DIAGNOSIS — L70.9 SAPHO SYNDROME: Primary | ICD-10-CM

## 2020-11-09 DIAGNOSIS — M65.9 SAPHO SYNDROME: Primary | ICD-10-CM

## 2020-11-09 DIAGNOSIS — M86.9 SAPHO SYNDROME: Primary | ICD-10-CM

## 2020-11-09 LAB
ALBUMIN SERPL BCP-MCNC: 3.5 G/DL (ref 3.5–5.2)
ALP SERPL-CCNC: 161 U/L (ref 55–135)
ALT SERPL W/O P-5'-P-CCNC: 21 U/L (ref 10–44)
ANION GAP SERPL CALC-SCNC: 9 MMOL/L (ref 8–16)
AST SERPL-CCNC: 18 U/L (ref 10–40)
BASOPHILS # BLD AUTO: 0.03 K/UL (ref 0–0.2)
BASOPHILS NFR BLD: 0.6 % (ref 0–1.9)
BILIRUB SERPL-MCNC: 0.5 MG/DL (ref 0.1–1)
BUN SERPL-MCNC: 12 MG/DL (ref 6–20)
CALCIUM SERPL-MCNC: 9 MG/DL (ref 8.7–10.5)
CHLORIDE SERPL-SCNC: 106 MMOL/L (ref 95–110)
CO2 SERPL-SCNC: 27 MMOL/L (ref 23–29)
CREAT SERPL-MCNC: 1 MG/DL (ref 0.5–1.4)
CRP SERPL-MCNC: 0.9 MG/L (ref 0–8.2)
DIFFERENTIAL METHOD: ABNORMAL
EOSINOPHIL # BLD AUTO: 0.2 K/UL (ref 0–0.5)
EOSINOPHIL NFR BLD: 3.3 % (ref 0–8)
ERYTHROCYTE [DISTWIDTH] IN BLOOD BY AUTOMATED COUNT: 14 % (ref 11.5–14.5)
ERYTHROCYTE [SEDIMENTATION RATE] IN BLOOD BY WESTERGREN METHOD: 65 MM/HR (ref 0–23)
EST. GFR  (AFRICAN AMERICAN): >60 ML/MIN/1.73 M^2
EST. GFR  (NON AFRICAN AMERICAN): >60 ML/MIN/1.73 M^2
GLUCOSE SERPL-MCNC: 189 MG/DL (ref 70–110)
HCT VFR BLD AUTO: 36.2 % (ref 40–54)
HGB BLD-MCNC: 11.1 G/DL (ref 14–18)
IMM GRANULOCYTES # BLD AUTO: 0.01 K/UL (ref 0–0.04)
IMM GRANULOCYTES NFR BLD AUTO: 0.2 % (ref 0–0.5)
LYMPHOCYTES # BLD AUTO: 1.6 K/UL (ref 1–4.8)
LYMPHOCYTES NFR BLD: 33.1 % (ref 18–48)
MCH RBC QN AUTO: 29.1 PG (ref 27–31)
MCHC RBC AUTO-ENTMCNC: 30.7 G/DL (ref 32–36)
MCV RBC AUTO: 95 FL (ref 82–98)
MONOCYTES # BLD AUTO: 0.5 K/UL (ref 0.3–1)
MONOCYTES NFR BLD: 9.6 % (ref 4–15)
NEUTROPHILS # BLD AUTO: 2.6 K/UL (ref 1.8–7.7)
NEUTROPHILS NFR BLD: 53.2 % (ref 38–73)
NRBC BLD-RTO: 0 /100 WBC
PLATELET # BLD AUTO: 229 K/UL (ref 150–350)
PMV BLD AUTO: 12.2 FL (ref 9.2–12.9)
POTASSIUM SERPL-SCNC: 3.5 MMOL/L (ref 3.5–5.1)
PROT SERPL-MCNC: 6.8 G/DL (ref 6–8.4)
RBC # BLD AUTO: 3.82 M/UL (ref 4.6–6.2)
SODIUM SERPL-SCNC: 142 MMOL/L (ref 136–145)
VANCOMYCIN TROUGH SERPL-MCNC: 16.4 UG/ML (ref 10–22)
WBC # BLD AUTO: 4.9 K/UL (ref 3.9–12.7)

## 2020-11-09 PROCEDURE — 85652 RBC SED RATE AUTOMATED: CPT

## 2020-11-09 PROCEDURE — 85025 COMPLETE CBC W/AUTO DIFF WBC: CPT

## 2020-11-09 PROCEDURE — 86140 C-REACTIVE PROTEIN: CPT

## 2020-11-09 PROCEDURE — 80053 COMPREHEN METABOLIC PANEL: CPT

## 2020-11-09 PROCEDURE — 80202 ASSAY OF VANCOMYCIN: CPT

## 2020-11-10 ENCOUNTER — PATIENT OUTREACH (OUTPATIENT)
Dept: ADMINISTRATIVE | Facility: OTHER | Age: 50
End: 2020-11-10

## 2020-11-11 ENCOUNTER — OFFICE VISIT (OUTPATIENT)
Dept: INFECTIOUS DISEASES | Facility: CLINIC | Age: 50
End: 2020-11-11
Payer: MEDICAID

## 2020-11-11 ENCOUNTER — OFFICE VISIT (OUTPATIENT)
Dept: PODIATRY | Facility: CLINIC | Age: 50
End: 2020-11-11
Payer: MEDICAID

## 2020-11-11 ENCOUNTER — INFUSION (OUTPATIENT)
Dept: INFECTIOUS DISEASES | Facility: HOSPITAL | Age: 50
End: 2020-11-11
Attending: INTERNAL MEDICINE
Payer: MEDICAID

## 2020-11-11 VITALS — DIASTOLIC BLOOD PRESSURE: 107 MMHG | SYSTOLIC BLOOD PRESSURE: 194 MMHG | HEART RATE: 90 BPM

## 2020-11-11 DIAGNOSIS — Z51.81 THERAPEUTIC DRUG MONITORING: ICD-10-CM

## 2020-11-11 DIAGNOSIS — Z79.4 TYPE 2 DIABETES MELLITUS WITH HYPERGLYCEMIA, WITH LONG-TERM CURRENT USE OF INSULIN: Primary | ICD-10-CM

## 2020-11-11 DIAGNOSIS — L08.9 LEFT FOOT INFECTION: Primary | ICD-10-CM

## 2020-11-11 DIAGNOSIS — M86.172 OTHER ACUTE OSTEOMYELITIS OF LEFT FOOT: ICD-10-CM

## 2020-11-11 DIAGNOSIS — Z86.31 HEALED DIABETIC ULCER OF FOOT: ICD-10-CM

## 2020-11-11 DIAGNOSIS — E11.65 TYPE 2 DIABETES MELLITUS WITH HYPERGLYCEMIA, WITH LONG-TERM CURRENT USE OF INSULIN: Primary | ICD-10-CM

## 2020-11-11 PROCEDURE — 99999 PR PBB SHADOW E&M-EST. PATIENT-LVL III: ICD-10-PCS | Mod: PBBFAC,,, | Performed by: PODIATRIST

## 2020-11-11 PROCEDURE — 99213 OFFICE O/P EST LOW 20 MIN: CPT | Mod: S$PBB,,, | Performed by: PHYSICIAN ASSISTANT

## 2020-11-11 PROCEDURE — 99213 PR OFFICE/OUTPT VISIT, EST, LEVL III, 20-29 MIN: ICD-10-PCS | Mod: S$PBB,,, | Performed by: PHYSICIAN ASSISTANT

## 2020-11-11 PROCEDURE — 99999 PR PBB SHADOW E&M-EST. PATIENT-LVL II: CPT | Mod: PBBFAC,,, | Performed by: PHYSICIAN ASSISTANT

## 2020-11-11 PROCEDURE — 99213 PR OFFICE/OUTPT VISIT, EST, LEVL III, 20-29 MIN: ICD-10-PCS | Mod: S$PBB,,, | Performed by: PODIATRIST

## 2020-11-11 PROCEDURE — 99212 OFFICE O/P EST SF 10 MIN: CPT | Mod: PBBFAC,27 | Performed by: PHYSICIAN ASSISTANT

## 2020-11-11 PROCEDURE — 99999 PR PBB SHADOW E&M-EST. PATIENT-LVL II: ICD-10-PCS | Mod: PBBFAC,,, | Performed by: PHYSICIAN ASSISTANT

## 2020-11-11 PROCEDURE — 99999 PR PBB SHADOW E&M-EST. PATIENT-LVL III: CPT | Mod: PBBFAC,,, | Performed by: PODIATRIST

## 2020-11-11 PROCEDURE — 99213 OFFICE O/P EST LOW 20 MIN: CPT | Mod: S$PBB,,, | Performed by: PODIATRIST

## 2020-11-11 PROCEDURE — 99213 OFFICE O/P EST LOW 20 MIN: CPT | Mod: PBBFAC | Performed by: PODIATRIST

## 2020-11-11 NOTE — PROGRESS NOTES
Subjective:      Patient ID: Indio Ryder Jr. is a 50 y.o. male.    Chief Complaint:No chief complaint on file.      History of Present Illness    HPI   Mr. Ryder is a 50 year old male with a history of DM2, diabetic neuropathy and chronic nonhealing diabetic left foot ulcer with prior bone cx showing MSSA s/p 6 weeks of IV antibiotics (completed in April) who is seen in podiatry clinic today for follow up for left foot osteomyelitis. His wound has been present since at least March. He has been treated by wound care at [facility].  The wound is deep and malodorous. He was seen by podiatry on 9/15. Seropurulent drainage, swelling and exposed bone/tendon deep within wound noted. Bone biopsy taken and sent for cx and pathology. Bone cx returned positive for skin janes. Pathology with acute osteomyelitis. He was seen by ID on 10/1 for acute osteomyelitis of left foot. Noted to have purulent foul draining discharge. Cultures taken grew skin janes, no significant isolate. CRP 5.6.X-ray 9/15 showed progressive osteolytic change at the 3rd and 4th MTP joints with subluxation most concerning for osteomyelitis and septic arthritis. No gas as seen on prior x-ray before completing oral abx. He was started on IV vancomycin and ceftriaxone (started 10/2) with a plan to complete 6 weeks of IV antibiotics. He is seen today for follow up with podiatry. He is doing great today. His wound has completely healed. No foot pain, erythema, warmth, induration. No issues with abx or his PICC line. Eager to have his PICC line removed. Denies fevers, chills, sweats.    RELIAPATH DIAGNOSIS:   BONE, LEFT FOOT, BIOPSY:   -Superficial bone with acute osteomyelitis.       Review of Systems   Constitution: Negative for chills, fever, malaise/fatigue and night sweats.   Cardiovascular: Negative for chest pain and leg swelling.   Respiratory: Negative for cough, shortness of breath and sputum production.    Skin: Positive for dry skin.  Negative for wound, color change and itching.   Musculoskeletal: Negative for back pain, joint pain and joint swelling.   Gastrointestinal: Negative for abdominal pain, diarrhea, heartburn, nausea and vomiting.   Genitourinary: Negative for dysuria, flank pain and hematuria.   Neurological: Negative for dizziness, numbness and weakness.   Psychiatric/Behavioral: Negative for altered mental status and depression. The patient is not nervous/anxious.      Objective:   Physical Exam  Vitals signs reviewed.   Constitutional:       Appearance: He is not ill-appearing, toxic-appearing or diaphoretic.   HENT:      Head: Normocephalic and atraumatic.   Eyes:      General: No scleral icterus.     Conjunctiva/sclera: Conjunctivae normal.   Cardiovascular:      Rate and Rhythm: Normal rate and regular rhythm.   Pulmonary:      Effort: Pulmonary effort is normal. No respiratory distress.   Musculoskeletal:         General: Swelling present.      Left lower leg: Edema present.   Skin:     General: Skin is warm and dry.      Comments: left plantar foot wound completely healed. No foot erythema, warmth, induration, tenderness  Neurological:      Mental Status: He is alert and oriented to person, place, and time.   Psychiatric:         Mood and Affect: Mood normal.         Behavior: Behavior normal.         Thought Content: Thought content normal.         Judgment: Judgment normal.       Assessment:       1. Left foot infection    2. Other acute osteomyelitis of left foot    3. Therapeutic drug monitoring          Plan:   Patient has completed six weeks of IV antibiotics. His wound has completely healed. No systemic signs or localized evidence of infection. CRP normalized.  - Will arrange PICC line removal in infusion center today  - Follow up with podiatry as planned  - RTC as needed.

## 2020-11-11 NOTE — PROGRESS NOTES
Right upper arm PICC line removed as per order. Pt tolerated well. Tip intact and length confirmed. Vaseline gauze dressing applied to site. Pt instructed to keep dressing on x24 hours. Pt observed for 30 minutes post removal. Pt left the unit in NAD.

## 2020-11-11 NOTE — PROGRESS NOTES
Subjective:      Patient ID: Indio Ryder Jr. is a 50 y.o. male.    Chief Complaint: Wound Check    Indio is a 50 y.o. male who presents to the clinic for evaluation and treatment of high risk feet. 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 The patient's chief complaint is diabetic foot ulcer, L . Pt has been seeing Dr. Almonte weekly. Patient has been in football dressing, ambulating in wedge Darco shoe x 1 week with out issues . Pt relates he was able to keep the dressing on and dry with no issues.     Pt relates he got picc line and is doing the abx with no issues; seeing ID today as well.     Pt relates his bp is high today in clinic because he was taken off his bp medication due to hypotension. No dizziness or other complaints.         10.16.20- Patient has been in football dressing, ambulating in Darco shoe x 1 week with out issues  No issues w/ IV abx     10.23.20- no issues w/ FB dressing     11/./20- no issues w/ FB. Happy w/ new walking boot     11.11.20- no issues w/ FB . No issues w. PICC    PCP: Lorena Thakur MD    Date Last Seen by PCP: 7/28/20  Chief Complaint   Patient presents with    Wound Check         Hemoglobin A1C   Date Value Ref Range Status   06/12/2020 7.2 (H) 4.0 - 5.6 % Final     Comment:     ADA Screening Guidelines:  5.7-6.4%  Consistent with prediabetes  >or=6.5%  Consistent with  diabetes  High levels of fetal hemoglobin interfere with the HbA1C  assay. Heterozygous hemoglobin variants (HbS, HgC, etc)do  not significantly interfere with this assay.   However, presence of multiple variants may affect accuracy.     03/12/2020 12.0 (H) 4.0 - 5.6 % Final     Comment:     ADA Screening Guidelines:  5.7-6.4%  Consistent with prediabetes  >or=6.5%  Consistent with diabetes  High levels of fetal hemoglobin interfere with the HbA1C  assay. Heterozygous hemoglobin variants (HbS, HgC, etc)do  not significantly interfere with this assay.   However, presence of multiple variants may affect accuracy.     12/03/2019 8.4 (H) 4.0 - 5.6 % Final     Comment:     ADA Screening Guidelines:  5.7-6.4%  Consistent with prediabetes  >or=6.5%  Consistent with diabetes  High levels of fetal hemoglobin interfere with the HbA1C  assay. Heterozygous hemoglobin variants (HbS, HgC, etc)do  not significantly interfere with this assay.   However, presence of multiple variants may affect accuracy.         Review of Systems   Constitution: Negative for chills, decreased appetite, diaphoresis, fever, malaise/fatigue, night sweats, weight gain and weight loss.   Cardiovascular: Negative for leg swelling.   Respiratory: Negative for cough.    Skin: Positive for dry skin and poor wound healing. Negative for flushing, itching and rash.   Musculoskeletal: Positive for neck pain. Negative for arthritis, joint pain, joint swelling and myalgias.   Gastrointestinal: Negative for nausea and vomiting.   Neurological: Positive for numbness. Negative for loss of balance and paresthesias.           Objective:       Vitals:    11/11/20 1509   BP: (!) 194/107   Pulse: 90   PainSc: 0-No pain        Physical Exam  Vitals signs reviewed.   Constitutional:       General: He is not in acute distress.     Appearance: He is well-developed. He is not ill-appearing, toxic-appearing or diaphoretic.   Cardiovascular:      Pulses:           Dorsalis pedis  pulses are 2+ on the left side.        Posterior tibial pulses are 1+ on the left side.      Comments: dorsalis pedis and posterior tibial pulses are palpable  + pedal hair growth         Musculoskeletal:         General: Swelling (stable ) present. No tenderness.      Right lower le+ Edema present.      Left lower le+ Edema present.      Left foot: Decreased range of motion. Deformity and prominent metatarsal heads present.      Comments: No pop or with debridement       Feet:      Right foot:      Protective Sensation: 0 sites tested. 0 sites sensed.      Left foot:      Protective Sensation: 0 sites tested. 5 sites sensed.      Skin integrity: Ulcer present.   Skin:     General: Skin is warm and dry.      Findings: No erythema, lesion or rash.      Comments: Ulcer location:  left, plantar foot --> healed    Neurological:      Mental Status: He is alert and oriented to person, place, and time.      Gait: Gait abnormal.   Psychiatric:         Attention and Perception: Attention normal.         Mood and Affect: Mood normal.         Speech: Speech normal.         Behavior: Behavior normal. Behavior is cooperative.         Thought Content: Thought content normal.         Cognition and Memory: Cognition normal.         Judgment: Judgment normal.                                   Assessment:       Encounter Diagnoses   Name Primary?    Type 2 diabetes mellitus with hyperglycemia, with long-term current use of insulin Yes    Healed diabetic ulcer of foot          Plan:       Indio was seen today for wound check.    Diagnoses and all orders for this visit:    Type 2 diabetes mellitus with hyperglycemia, with long-term current use of insulin    Healed diabetic ulcer of foot      I counseled the patient on his conditions, their implications and medical management.    No issues w/ IV abx , therapy complete . Plan to pull PICC today   I recommend security football placement to allow for further strengthening of  newly formed epithelial tissues.  Erma Gonzáles MA assisted w/ application of football dressing under my direct supervision. Darco shoe applied to offload the area. Advised patient that this should be worn on the affected foot at all times when ambulating     Follow-up:Patient is to return to the clinic in 1 week  for follow-up but should call [facility] immediately if any signs of infection, such as fever, chills, sweats, increased redness or pain.    Short-term goals include maintaining good offloading and minimizing bioburden, promoting granulation and epithelialization to healing.  Long-term goals include keeping the wound healed by good offloading and medical management under the direction of internist.

## 2020-11-17 ENCOUNTER — OFFICE VISIT (OUTPATIENT)
Dept: PODIATRY | Facility: CLINIC | Age: 50
End: 2020-11-17
Payer: MEDICAID

## 2020-11-17 VITALS
HEIGHT: 73 IN | WEIGHT: 235 LBS | DIASTOLIC BLOOD PRESSURE: 98 MMHG | SYSTOLIC BLOOD PRESSURE: 157 MMHG | RESPIRATION RATE: 18 BRPM | BODY MASS INDEX: 31.14 KG/M2 | HEART RATE: 89 BPM | TEMPERATURE: 98 F

## 2020-11-17 DIAGNOSIS — E11.65 TYPE 2 DIABETES MELLITUS WITH HYPERGLYCEMIA, WITH LONG-TERM CURRENT USE OF INSULIN: Primary | ICD-10-CM

## 2020-11-17 DIAGNOSIS — Z86.31 HEALED DIABETIC ULCER OF FOOT: ICD-10-CM

## 2020-11-17 DIAGNOSIS — Z79.4 TYPE 2 DIABETES MELLITUS WITH HYPERGLYCEMIA, WITH LONG-TERM CURRENT USE OF INSULIN: Primary | ICD-10-CM

## 2020-11-17 PROCEDURE — 99212 PR OFFICE/OUTPT VISIT, EST, LEVL II, 10-19 MIN: ICD-10-PCS | Mod: S$PBB,,, | Performed by: PODIATRIST

## 2020-11-17 PROCEDURE — 99999 PR PBB SHADOW E&M-EST. PATIENT-LVL IV: CPT | Mod: PBBFAC,,, | Performed by: PODIATRIST

## 2020-11-17 PROCEDURE — 99214 OFFICE O/P EST MOD 30 MIN: CPT | Mod: PBBFAC | Performed by: PODIATRIST

## 2020-11-17 PROCEDURE — 99999 PR PBB SHADOW E&M-EST. PATIENT-LVL IV: ICD-10-PCS | Mod: PBBFAC,,, | Performed by: PODIATRIST

## 2020-11-17 PROCEDURE — 99212 OFFICE O/P EST SF 10 MIN: CPT | Mod: S$PBB,,, | Performed by: PODIATRIST

## 2020-11-17 NOTE — PROGRESS NOTES
Subjective:      Patient ID: Indio Ryder Jr. is a 50 y.o. male.    Chief Complaint: Follow-up (wound check ), Foot Ulcer, and Dressing Change    Indio is a 50 y.o. male who presents to the clinic for evaluation and treatment of high risk feet. 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 The patient's chief complaint is diabetic foot ulcer, L . Pt has been seeing Dr. Almonte weekly. Patient has been in football dressing, ambulating in wedge Darco shoe x 1 week with out issues . Pt relates he was able to keep the dressing on and dry with no issues.     Pt relates he got picc line and is doing the abx with no issues; seeing ID today as well.     Pt relates his bp is high today in clinic because he was taken off his bp medication due to hypotension. No dizziness or other complaints.         10.16.20- Patient has been in football dressing, ambulating in Darco shoe x 1 week with out issues  No issues w/ IV abx     10.23.20- no issues w/ FB dressing     11/./20- no issues w/ FB. Happy w/ new walking boot     11.11.20- no issues w/ FB . No issues w. PICC    11.17.20- Patient has been in football dressing, ambulating in boot  x 1 week with out issues     PCP: Lorena Thakur MD    Date Last Seen by PCP: 7/28/20  Chief Complaint   Patient presents with    Follow-up     wound check     Foot Ulcer    Dressing Change          Hemoglobin A1C   Date Value Ref Range Status   06/12/2020 7.2 (H) 4.0 - 5.6 % Final     Comment:     ADA Screening Guidelines:  5.7-6.4%  Consistent with prediabetes  >or=6.5%  Consistent with diabetes  High levels of fetal hemoglobin interfere with the HbA1C  assay. Heterozygous hemoglobin variants (HbS, HgC, etc)do  not significantly interfere with this assay.   However, presence of multiple variants may affect accuracy.     03/12/2020 12.0 (H) 4.0 - 5.6 % Final     Comment:     ADA Screening Guidelines:  5.7-6.4%  Consistent with prediabetes  >or=6.5%  Consistent with diabetes  High levels of fetal hemoglobin interfere with the HbA1C  assay. Heterozygous hemoglobin variants (HbS, HgC, etc)do  not significantly interfere with this assay.   However, presence of multiple variants may affect accuracy.     12/03/2019 8.4 (H) 4.0 - 5.6 % Final     Comment:     ADA Screening Guidelines:  5.7-6.4%  Consistent with prediabetes  >or=6.5%  Consistent with diabetes  High levels of fetal hemoglobin interfere with the HbA1C  assay. Heterozygous hemoglobin variants (HbS, HgC, etc)do  not significantly interfere with this assay.   However, presence of multiple variants may affect accuracy.         Review of Systems   Constitution: Negative for chills, decreased appetite, diaphoresis, fever, malaise/fatigue, night sweats, weight gain and weight loss.   Cardiovascular: Negative for leg swelling.   Respiratory: Negative for cough.    Skin: Positive for dry skin and poor wound healing. Negative for flushing, itching and rash.   Musculoskeletal: Positive for neck pain. Negative for arthritis, joint pain, joint swelling and myalgias.   Gastrointestinal: Negative for nausea and vomiting.   Neurological: Positive for numbness. Negative for loss of balance and paresthesias.           Objective:       Vitals:    11/17/20 1429   BP: (!) 157/98   Pulse: 89   Resp: 18   Temp: 97.5 °F (36.4 °C)   TempSrc: Oral   Weight: 106.6 kg (235  "lb)   Height: 6' 1" (1.854 m)   PainSc: 0-No pain        Physical Exam  Vitals signs reviewed.   Constitutional:       General: He is not in acute distress.     Appearance: He is well-developed. He is not ill-appearing, toxic-appearing or diaphoretic.   Cardiovascular:      Pulses:           Dorsalis pedis pulses are 2+ on the left side.        Posterior tibial pulses are 1+ on the left side.      Comments: dorsalis pedis and posterior tibial pulses are palpable  + pedal hair growth         Musculoskeletal:         General: Swelling (stable ) present. No tenderness.      Right lower le+ Edema present.      Left lower le+ Edema present.      Left foot: Decreased range of motion. Deformity and prominent metatarsal heads present.      Comments: No pop or with debridement       Feet:      Right foot:      Protective Sensation: 0 sites tested. 0 sites sensed.      Left foot:      Protective Sensation: 0 sites tested. 5 sites sensed.      Skin integrity: Ulcer present.   Skin:     General: Skin is warm and dry.      Findings: No erythema, lesion or rash.      Comments: Ulcer location:  left, plantar foot --> healed    Neurological:      Mental Status: He is alert and oriented to person, place, and time.      Gait: Gait abnormal.   Psychiatric:         Attention and Perception: Attention normal.         Mood and Affect: Mood normal.         Speech: Speech normal.         Behavior: Behavior normal. Behavior is cooperative.         Thought Content: Thought content normal.         Cognition and Memory: Cognition normal.         Judgment: Judgment normal.                                   Assessment:       Encounter Diagnoses   Name Primary?    Type 2 diabetes mellitus with hyperglycemia, with long-term current use of insulin Yes    Healed diabetic ulcer of foot          Plan:       Indio was seen today for follow-up, foot ulcer and dressing change.    Diagnoses and all orders for this visit:    Type 2 diabetes " mellitus with hyperglycemia, with long-term current use of insulin    Healed diabetic ulcer of foot      I counseled the patient on his conditions, their implications and medical management.  Off all abx   No pain to foot   No issues w/ security FB   I recommend another security football placement to allow for further strengthening of newly formed epithelial tissues.  Erma Gonzáles MA assisted w/ application of football dressing under my direct supervision. Darco shoe applied to offload the area. Advised patient that this should be worn on the affected foot at all times when ambulating     Follow-up:Patient is to return to the clinic in 1 week  for follow-up but should call [facility] immediately if any signs of infection, such as fever, chills, sweats, increased redness or pain.    Short-term goals include maintaining good offloading and minimizing bioburden, promoting granulation and epithelialization to healing.  Long-term goals include keeping the wound healed by good offloading and medical management under the direction of internist.

## 2020-11-24 ENCOUNTER — OFFICE VISIT (OUTPATIENT)
Dept: PODIATRY | Facility: CLINIC | Age: 50
End: 2020-11-24
Payer: MEDICAID

## 2020-11-24 VITALS
HEIGHT: 73 IN | DIASTOLIC BLOOD PRESSURE: 97 MMHG | HEART RATE: 91 BPM | WEIGHT: 235 LBS | SYSTOLIC BLOOD PRESSURE: 164 MMHG | BODY MASS INDEX: 31.14 KG/M2 | RESPIRATION RATE: 18 BRPM | TEMPERATURE: 97 F

## 2020-11-24 DIAGNOSIS — Z79.4 TYPE 2 DIABETES MELLITUS WITH HYPERGLYCEMIA, WITH LONG-TERM CURRENT USE OF INSULIN: Primary | ICD-10-CM

## 2020-11-24 DIAGNOSIS — S90.822A BLISTER OF LEFT FOOT, INITIAL ENCOUNTER: ICD-10-CM

## 2020-11-24 DIAGNOSIS — E11.65 TYPE 2 DIABETES MELLITUS WITH HYPERGLYCEMIA, WITH LONG-TERM CURRENT USE OF INSULIN: Primary | ICD-10-CM

## 2020-11-24 PROCEDURE — 99999 PR PBB SHADOW E&M-EST. PATIENT-LVL III: CPT | Mod: PBBFAC,,, | Performed by: PODIATRIST

## 2020-11-24 PROCEDURE — 99213 OFFICE O/P EST LOW 20 MIN: CPT | Mod: S$PBB,,, | Performed by: PODIATRIST

## 2020-11-24 PROCEDURE — 99213 PR OFFICE/OUTPT VISIT, EST, LEVL III, 20-29 MIN: ICD-10-PCS | Mod: S$PBB,,, | Performed by: PODIATRIST

## 2020-11-24 PROCEDURE — 99213 OFFICE O/P EST LOW 20 MIN: CPT | Mod: PBBFAC | Performed by: PODIATRIST

## 2020-11-24 PROCEDURE — 99999 PR PBB SHADOW E&M-EST. PATIENT-LVL III: ICD-10-PCS | Mod: PBBFAC,,, | Performed by: PODIATRIST

## 2020-11-24 NOTE — PROGRESS NOTES
Subjective:      Patient ID: Indio Ryder Jr. is a 50 y.o. male.    Chief Complaint: Wound Care (left foot ), Foot Ulcer, and Dressing Change    Indio is a 50 y.o. male who presents to the clinic for evaluation and treatment of high risk feet. 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 The patient's chief complaint is diabetic foot ulcer, L . Pt has been seeing Dr. Almonte weekly. Patient has been in football dressing, ambulating in wedge Darco shoe x 1 week with out issues . Pt relates he was able to keep the dressing on and dry with no issues.     Pt relates he got picc line and is doing the abx with no issues; seeing ID today as well.     Pt relates his bp is high today in clinic because he was taken off his bp medication due to hypotension. No dizziness or other complaints.         10.16.20- Patient has been in football dressing, ambulating in Darco shoe x 1 week with out issues  No issues w/ IV abx     10.23.20- no issues w/ FB dressing     11/./20- no issues w/ FB. Happy w/ new walking boot     11.11.20- no issues w/ FB . No issues w. PICC    11.17.20- Patient has been in football dressing, ambulating in boot  x 1 week with out issues     11.24.20- Patient has been in football dressing, ambulating in Darco shoe x 1 week with out issues     PCP: Lorena Thakur MD    Date Last Seen by PCP: 7/28/20  Chief  Complaint   Patient presents with    Wound Care     left foot     Foot Ulcer    Dressing Change         Hemoglobin A1C   Date Value Ref Range Status   06/12/2020 7.2 (H) 4.0 - 5.6 % Final     Comment:     ADA Screening Guidelines:  5.7-6.4%  Consistent with prediabetes  >or=6.5%  Consistent with diabetes  High levels of fetal hemoglobin interfere with the HbA1C  assay. Heterozygous hemoglobin variants (HbS, HgC, etc)do  not significantly interfere with this assay.   However, presence of multiple variants may affect accuracy.     03/12/2020 12.0 (H) 4.0 - 5.6 % Final     Comment:     ADA Screening Guidelines:  5.7-6.4%  Consistent with prediabetes  >or=6.5%  Consistent with diabetes  High levels of fetal hemoglobin interfere with the HbA1C  assay. Heterozygous hemoglobin variants (HbS, HgC, etc)do  not significantly interfere with this assay.   However, presence of multiple variants may affect accuracy.     12/03/2019 8.4 (H) 4.0 - 5.6 % Final     Comment:     ADA Screening Guidelines:  5.7-6.4%  Consistent with prediabetes  >or=6.5%  Consistent with diabetes  High levels of fetal hemoglobin interfere with the HbA1C  assay. Heterozygous hemoglobin variants (HbS, HgC, etc)do  not significantly interfere with this assay.   However, presence of multiple variants may affect accuracy.         Review of Systems   Constitution: Negative for chills, decreased appetite, diaphoresis, fever, malaise/fatigue, night sweats, weight gain and weight loss.   Cardiovascular: Negative for leg swelling.   Respiratory: Negative for cough.    Skin: Positive for dry skin and poor wound healing. Negative for flushing, itching and rash.   Musculoskeletal: Positive for neck pain. Negative for arthritis, joint pain, joint swelling and myalgias.   Gastrointestinal: Negative for nausea and vomiting.   Neurological: Positive for numbness. Negative for loss of balance and paresthesias.           Objective:       Vitals:    11/24/20 1139   BP:  "(!) 164/97   Pulse: 91   Resp: 18   Temp: 96.8 °F (36 °C)   TempSrc: Axillary   Weight: 106.6 kg (235 lb)   Height: 6' 1" (1.854 m)   PainSc: 0-No pain        Physical Exam  Vitals signs reviewed.   Constitutional:       General: He is not in acute distress.     Appearance: He is well-developed. He is not ill-appearing, toxic-appearing or diaphoretic.   Cardiovascular:      Pulses:           Dorsalis pedis pulses are 2+ on the left side.        Posterior tibial pulses are 1+ on the left side.      Comments: dorsalis pedis and posterior tibial pulses are palpable  + pedal hair growth         Musculoskeletal:         General: Swelling (stable ) present. No tenderness.      Right lower le+ Edema present.      Left lower le+ Edema present.      Left foot: Decreased range of motion. Deformity and prominent metatarsal heads present.      Comments: No pop or with debridement       Feet:      Right foot:      Protective Sensation: 0 sites tested. 0 sites sensed.      Left foot:      Protective Sensation: 0 sites tested. 5 sites sensed.      Skin integrity: Ulcer present.   Skin:     General: Skin is warm and dry.      Findings: No erythema, lesion or rash.      Comments: Ulcer location:  left, plantar foot --> healed    Neurological:      Mental Status: He is alert and oriented to person, place, and time.      Gait: Gait abnormal.   Psychiatric:         Attention and Perception: Attention normal.         Mood and Affect: Mood normal.         Speech: Speech normal.         Behavior: Behavior normal. Behavior is cooperative.         Thought Content: Thought content normal.         Cognition and Memory: Cognition normal.         Judgment: Judgment normal.                                   Assessment:       Encounter Diagnoses   Name Primary?    Type 2 diabetes mellitus with hyperglycemia, with long-term current use of insulin Yes    Blister of left foot, initial encounter          Plan:       Indio was seen today " for wound care, foot ulcer and dressing change.    Diagnoses and all orders for this visit:    Type 2 diabetes mellitus with hyperglycemia, with long-term current use of insulin    Blister of left foot, initial encounter      I counseled the patient on his conditions, their implications and medical management.    Small blister noted plantar lateral L foot. No surrounding erythema, edema, malodor, nor drainage noted   Bloody drainage expressed after manual r manipulation  Small 0.2x0.2 cm lesions noted   Betadine w/ kerramax applied     Erma Gonzáles MA assisted w/ application of football dressing under my direct supervision. Darco shoe applied to offload the area. Advised patient that this should be worn on the affected foot at all times when ambulating     Follow-up:Patient is to return to the clinic in 1 week  for follow-up but should call [facility] immediately if any signs of infection, such as fever, chills, sweats, increased redness or pain.    Short-term goals include maintaining good offloading and minimizing bioburden, promoting granulation and epithelialization to healing.  Long-term goals include keeping the wound healed by good offloading and medical management under the direction of internist.

## 2020-12-04 ENCOUNTER — PATIENT MESSAGE (OUTPATIENT)
Dept: INTERNAL MEDICINE | Facility: CLINIC | Age: 50
End: 2020-12-04

## 2020-12-04 ENCOUNTER — OFFICE VISIT (OUTPATIENT)
Dept: PODIATRY | Facility: CLINIC | Age: 50
End: 2020-12-04
Payer: MEDICAID

## 2020-12-04 VITALS
HEART RATE: 83 BPM | HEIGHT: 73 IN | TEMPERATURE: 98 F | SYSTOLIC BLOOD PRESSURE: 182 MMHG | WEIGHT: 235 LBS | BODY MASS INDEX: 31.14 KG/M2 | DIASTOLIC BLOOD PRESSURE: 99 MMHG | RESPIRATION RATE: 17 BRPM

## 2020-12-04 DIAGNOSIS — E11.65 TYPE 2 DIABETES MELLITUS WITH HYPERGLYCEMIA, WITH LONG-TERM CURRENT USE OF INSULIN: Primary | ICD-10-CM

## 2020-12-04 DIAGNOSIS — L97.512 ULCER OF RIGHT FOOT WITH FAT LAYER EXPOSED: ICD-10-CM

## 2020-12-04 DIAGNOSIS — Z79.4 TYPE 2 DIABETES MELLITUS WITH HYPERGLYCEMIA, WITH LONG-TERM CURRENT USE OF INSULIN: Primary | ICD-10-CM

## 2020-12-04 PROCEDURE — 11042 PR DEBRIDEMENT, SKIN, SUB-Q TISSUE,=<20 SQ CM: ICD-10-PCS | Mod: S$PBB,,, | Performed by: PODIATRIST

## 2020-12-04 PROCEDURE — 99499 UNLISTED E&M SERVICE: CPT | Mod: S$PBB,,, | Performed by: PODIATRIST

## 2020-12-04 PROCEDURE — 99499 NO LOS: ICD-10-PCS | Mod: S$PBB,,, | Performed by: PODIATRIST

## 2020-12-04 PROCEDURE — 99213 OFFICE O/P EST LOW 20 MIN: CPT | Mod: PBBFAC | Performed by: PODIATRIST

## 2020-12-04 PROCEDURE — 11042 DBRDMT SUBQ TIS 1ST 20SQCM/<: CPT | Mod: S$PBB,,, | Performed by: PODIATRIST

## 2020-12-04 PROCEDURE — 99999 PR PBB SHADOW E&M-EST. PATIENT-LVL III: ICD-10-PCS | Mod: PBBFAC,,, | Performed by: PODIATRIST

## 2020-12-04 PROCEDURE — 99999 PR PBB SHADOW E&M-EST. PATIENT-LVL III: CPT | Mod: PBBFAC,,, | Performed by: PODIATRIST

## 2020-12-04 PROCEDURE — 11042 DBRDMT SUBQ TIS 1ST 20SQCM/<: CPT | Mod: PBBFAC | Performed by: PODIATRIST

## 2020-12-04 RX ORDER — ATORVASTATIN CALCIUM 80 MG/1
80 TABLET, FILM COATED ORAL DAILY
Qty: 90 TABLET | Refills: 0 | Status: SHIPPED | OUTPATIENT
Start: 2020-12-04 | End: 2022-03-21

## 2020-12-04 RX ORDER — PEN NEEDLE, DIABETIC 30 GX3/16"
NEEDLE, DISPOSABLE MISCELLANEOUS
Qty: 100 EACH | Refills: 3 | Status: CANCELLED | OUTPATIENT
Start: 2020-12-04

## 2020-12-04 RX ORDER — LIRAGLUTIDE 6 MG/ML
INJECTION SUBCUTANEOUS
Qty: 6 ML | Refills: 2 | Status: ON HOLD | OUTPATIENT
Start: 2020-12-04 | End: 2021-05-01 | Stop reason: HOSPADM

## 2020-12-05 RX ORDER — PEN NEEDLE, DIABETIC 30 GX3/16"
NEEDLE, DISPOSABLE MISCELLANEOUS
Qty: 100 EACH | Refills: 3 | Status: CANCELLED | OUTPATIENT
Start: 2020-12-04

## 2020-12-07 ENCOUNTER — PATIENT MESSAGE (OUTPATIENT)
Dept: INTERNAL MEDICINE | Facility: CLINIC | Age: 50
End: 2020-12-07

## 2020-12-07 RX ORDER — INSULIN ASPART 100 [IU]/ML
INJECTION, SOLUTION INTRAVENOUS; SUBCUTANEOUS
Qty: 30 ML | Refills: 11 | Status: ON HOLD | OUTPATIENT
Start: 2020-12-07 | End: 2021-05-01 | Stop reason: HOSPADM

## 2020-12-07 NOTE — PROGRESS NOTES
Subjective:      Patient ID: Indio Ryder Jr. is a 50 y.o. male.    Chief Complaint: Wound Care, Foot Ulcer, and Dressing Change    Indio is a 50 y.o. male who presents to the clinic for evaluation and treatment of high risk feet. 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 The patient's chief complaint is diabetic foot ulcer, L . Pt has been seeing Dr. Almonte weekly. Patient has been in football dressing, ambulating in wedge Darco shoe x 1 week with out issues . Pt relates he was able to keep the dressing on and dry with no issues.     Pt relates he got picc line and is doing the abx with no issues; seeing ID today as well.     Pt relates his bp is high today in clinic because he was taken off his bp medication due to hypotension. No dizziness or other complaints.         10.16.20- Patient has been in football dressing, ambulating in Darco shoe x 1 week with out issues  No issues w/ IV abx     10.23.20- no issues w/ FB dressing     11/./20- no issues w/ FB. Happy w/ new walking boot     11.11.20- no issues w/ FB . No issues w. PICC    11.17.20- Patient has been in football dressing, ambulating in boot  x 1 week with out issues     11.24.20- Patient has been in football dressing, ambulating in Darco shoe x 1 week with out issues     12.4.20 Patient has been in football dressing, ambulating in Darco shoe x 1 week  with out issues     PCP: Lorena Thakur MD    Date Last Seen by PCP: 7/28/20  Chief Complaint   Patient presents with    Wound Care    Foot Ulcer    Dressing Change         Hemoglobin A1C   Date Value Ref Range Status   06/12/2020 7.2 (H) 4.0 - 5.6 % Final     Comment:     ADA Screening Guidelines:  5.7-6.4%  Consistent with prediabetes  >or=6.5%  Consistent with diabetes  High levels of fetal hemoglobin interfere with the HbA1C  assay. Heterozygous hemoglobin variants (HbS, HgC, etc)do  not significantly interfere with this assay.   However, presence of multiple variants may affect accuracy.     03/12/2020 12.0 (H) 4.0 - 5.6 % Final     Comment:     ADA Screening Guidelines:  5.7-6.4%  Consistent with prediabetes  >or=6.5%  Consistent with diabetes  High levels of fetal hemoglobin interfere with the HbA1C  assay. Heterozygous hemoglobin variants (HbS, HgC, etc)do  not significantly interfere with this assay.   However, presence of multiple variants may affect accuracy.     12/03/2019 8.4 (H) 4.0 - 5.6 % Final     Comment:     ADA Screening Guidelines:  5.7-6.4%  Consistent with prediabetes  >or=6.5%  Consistent with diabetes  High levels of fetal hemoglobin interfere with the HbA1C  assay. Heterozygous hemoglobin variants (HbS, HgC, etc)do  not significantly interfere with this assay.   However, presence of multiple variants may affect accuracy.         Review of Systems   Constitution: Negative for chills, decreased appetite, diaphoresis, fever, malaise/fatigue, night sweats, weight gain and weight loss.   Cardiovascular: Negative for leg swelling.   Respiratory: Negative for cough.    Skin: Positive for dry skin and poor wound healing. Negative for flushing, itching and rash.   Musculoskeletal: Positive for neck pain. Negative for arthritis, joint pain, joint swelling and myalgias.   Gastrointestinal: Negative for nausea and vomiting.   Neurological: Positive for numbness. Negative for loss of balance and  "paresthesias.           Objective:       Vitals:    20 1134   BP: (!) 182/99   Pulse: 83   Resp: 17   Temp: 97.7 °F (36.5 °C)   TempSrc: Oral   Weight: 106.6 kg (235 lb)   Height: 6' 1" (1.854 m)   PainSc: 0-No pain        Physical Exam  Vitals signs reviewed.   Constitutional:       General: He is not in acute distress.     Appearance: He is well-developed. He is not ill-appearing, toxic-appearing or diaphoretic.   Cardiovascular:      Pulses:           Dorsalis pedis pulses are 2+ on the left side.        Posterior tibial pulses are 1+ on the left side.      Comments: dorsalis pedis and posterior tibial pulses are palpable  + pedal hair growth         Musculoskeletal:         General: Swelling (stable ) present. No tenderness.      Right lower le+ Edema present.      Left lower le+ Edema present.      Left foot: Decreased range of motion. Deformity and prominent metatarsal heads present.      Comments: No pop or with debridement       Feet:      Right foot:      Protective Sensation: 0 sites tested. 0 sites sensed.      Left foot:      Protective Sensation: 0 sites tested. 5 sites sensed.      Skin integrity: Ulcer present.   Skin:     General: Skin is warm and dry.      Findings: No erythema, lesion or rash.      Comments: Ulcer location:  left, plantar foot --> healed    Neurological:      Mental Status: He is alert and oriented to person, place, and time.      Gait: Gait abnormal.   Psychiatric:         Attention and Perception: Attention normal.         Mood and Affect: Mood normal.         Speech: Speech normal.         Behavior: Behavior normal. Behavior is cooperative.         Thought Content: Thought content normal.         Cognition and Memory: Cognition normal.         Judgment: Judgment normal.                                   Assessment:       Encounter Diagnoses   Name Primary?    Type 2 diabetes mellitus with hyperglycemia, with long-term current use of insulin Yes    Ulcer of right " foot with fat layer exposed          Plan:       Indio was seen today for wound care, foot ulcer and dressing change.    Diagnoses and all orders for this visit:    Type 2 diabetes mellitus with hyperglycemia, with long-term current use of insulin    Ulcer of right foot with fat layer exposed      I counseled the patient on his conditions, their implications and medical management.    Previous blister has caused FT wound w/p SOI    Debridement: With verbal consent, nonviable tissues on the left foot were debrided beyond sub q utilizing a  sterile No. 3 scalpel and forceps. Minimal bleeding controlled with direct pressure  The patient tolerated this well.     Dressings: [place]  Offloading:Foam    Follow-up:Patient is to return to the clinic in 1 week  for follow-up but should call [facility] immediately if any signs of infection, such as fever, chills, sweats, increased redness or pain.    Short-term goals include maintaining good offloading and minimizing bioburden, promoting granulation and epithelialization to healing.  Long-term goals include keeping the wound healed by good offloading and medical management under the direction of internist.    Erma Gonzáles MA assisted w/ application of football dressing under my direct supervision. Darco shoe applied to offload the area. Advised patient that this should be worn on the affected foot at all times when ambulating     Follow-up:Patient is to return to the clinic in 1 week  for follow-up but should call [facility] immediately if any signs of infection, such as fever, chills, sweats, increased redness or pain.    Short-term goals include maintaining good offloading and minimizing bioburden, promoting granulation and epithelialization to healing.  Long-term goals include keeping the wound healed by good offloading and medical management under the direction of internist.

## 2020-12-07 NOTE — TELEPHONE ENCOUNTER
Care Due:                  Date            Visit Type   Department     Provider  --------------------------------------------------------------------------------                                             [facility]  Last Visit: 09-      None         MEDICINE       REJI ENRIQUEZ  Next Visit: None Scheduled  None         None Found                                                            Last  Test          Frequency    Reason                     Performed    Due Date  --------------------------------------------------------------------------------    Lipid Panel.  12 months..  atorvastatin.............  12- 11-    Powered by [employer]. Reference number: [id number]. 12/07/2020 1:48:14 PM   CST

## 2020-12-09 ENCOUNTER — OFFICE VISIT (OUTPATIENT)
Dept: PODIATRY | Facility: CLINIC | Age: 50
End: 2020-12-09
Payer: MEDICAID

## 2020-12-09 VITALS
HEART RATE: 93 BPM | RESPIRATION RATE: 18 BRPM | HEIGHT: 73 IN | DIASTOLIC BLOOD PRESSURE: 96 MMHG | BODY MASS INDEX: 31.14 KG/M2 | WEIGHT: 235 LBS | SYSTOLIC BLOOD PRESSURE: 164 MMHG

## 2020-12-09 DIAGNOSIS — E11.65 TYPE 2 DIABETES MELLITUS WITH HYPERGLYCEMIA, WITH LONG-TERM CURRENT USE OF INSULIN: Primary | ICD-10-CM

## 2020-12-09 DIAGNOSIS — Z79.4 TYPE 2 DIABETES MELLITUS WITH HYPERGLYCEMIA, WITH LONG-TERM CURRENT USE OF INSULIN: Primary | ICD-10-CM

## 2020-12-09 DIAGNOSIS — L97.512 ULCER OF RIGHT FOOT WITH FAT LAYER EXPOSED: ICD-10-CM

## 2020-12-09 PROCEDURE — 99213 OFFICE O/P EST LOW 20 MIN: CPT | Mod: PBBFAC | Performed by: PODIATRIST

## 2020-12-09 PROCEDURE — 99999 PR PBB SHADOW E&M-EST. PATIENT-LVL III: CPT | Mod: PBBFAC,,, | Performed by: PODIATRIST

## 2020-12-09 PROCEDURE — 11042 DBRDMT SUBQ TIS 1ST 20SQCM/<: CPT | Mod: S$PBB,,, | Performed by: PODIATRIST

## 2020-12-09 PROCEDURE — 99499 NO LOS: ICD-10-PCS | Mod: S$PBB,,, | Performed by: PODIATRIST

## 2020-12-09 PROCEDURE — 11042 DBRDMT SUBQ TIS 1ST 20SQCM/<: CPT | Mod: PBBFAC | Performed by: PODIATRIST

## 2020-12-09 PROCEDURE — 99499 UNLISTED E&M SERVICE: CPT | Mod: S$PBB,,, | Performed by: PODIATRIST

## 2020-12-09 PROCEDURE — 99999 PR PBB SHADOW E&M-EST. PATIENT-LVL III: ICD-10-PCS | Mod: PBBFAC,,, | Performed by: PODIATRIST

## 2020-12-09 PROCEDURE — 11042 PR DEBRIDEMENT, SKIN, SUB-Q TISSUE,=<20 SQ CM: ICD-10-PCS | Mod: S$PBB,,, | Performed by: PODIATRIST

## 2020-12-09 RX ORDER — PEN NEEDLE, DIABETIC 30 GX3/16"
NEEDLE, DISPOSABLE MISCELLANEOUS
Qty: 100 EACH | Refills: 3 | Status: CANCELLED | OUTPATIENT
Start: 2020-12-04

## 2020-12-11 ENCOUNTER — PATIENT MESSAGE (OUTPATIENT)
Dept: OTHER | Facility: OTHER | Age: 50
End: 2020-12-11

## 2020-12-15 ENCOUNTER — PATIENT OUTREACH (OUTPATIENT)
Dept: ADMINISTRATIVE | Facility: OTHER | Age: 50
End: 2020-12-15

## 2020-12-15 NOTE — PROGRESS NOTES
Subjective:      Patient ID: Indio Ryder Jr. is a 50 y.o. male.    Chief Complaint: Wound Care, Foot Ulcer, and Dressing Change    Indio is a 50 y.o. male who presents to the clinic for evaluation and treatment of high risk feet. 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 The patient's chief complaint is diabetic foot ulcer, L .    12.9.20- Patient has been in football dressing, ambulating in walking boot x 1 week with out issues     PCP: Lorena Thakur MD    Date Last Seen by PCP: 7/28/20  Chief Complaint   Patient presents with    Wound Care    Foot Ulcer    Dressing Change         Hemoglobin A1C   Date Value Ref Range Status   06/12/2020 7.2 (H) 4.0 - 5.6 % Final     Comment:     ADA Screening Guidelines:  5.7-6.4%  Consistent with prediabetes  >or=6.5%  Consistent with diabetes  High levels of fetal hemoglobin interfere with the HbA1C  assay. Heterozygous hemoglobin variants (HbS, HgC, etc)do  not significantly interfere with this assay.   However, presence of multiple variants may affect accuracy.     03/12/2020 12.0 (H) 4.0 - 5.6 % Final     Comment:     ADA Screening Guidelines:  5.7-6.4%  Consistent with prediabetes  >or=6.5%  Consistent with diabetes  High levels of fetal hemoglobin interfere with the HbA1C  assay. Heterozygous hemoglobin variants (HbS, HgC, etc)do  not significantly interfere  "with this assay.   However, presence of multiple variants may affect accuracy.     2019 8.4 (H) 4.0 - 5.6 % Final     Comment:     ADA Screening Guidelines:  5.7-6.4%  Consistent with prediabetes  >or=6.5%  Consistent with diabetes  High levels of fetal hemoglobin interfere with the HbA1C  assay. Heterozygous hemoglobin variants (HbS, HgC, etc)do  not significantly interfere with this assay.   However, presence of multiple variants may affect accuracy.         Review of Systems   Constitution: Negative for chills, decreased appetite, diaphoresis, fever, malaise/fatigue, night sweats, weight gain and weight loss.   Cardiovascular: Negative for leg swelling.   Respiratory: Negative for cough.    Skin: Positive for dry skin and poor wound healing. Negative for flushing, itching and rash.   Musculoskeletal: Positive for neck pain. Negative for arthritis, joint pain, joint swelling and myalgias.   Gastrointestinal: Negative for nausea and vomiting.   Neurological: Positive for numbness. Negative for loss of balance and paresthesias.           Objective:       Vitals:    20 1511   BP: (!) 164/96   Pulse: 93   Resp: 18   Weight: 106.6 kg (235 lb)   Height: 6' 1" (1.854 m)   PainSc: 0-No pain        Physical Exam  Vitals signs reviewed.   Constitutional:       General: He is not in acute distress.     Appearance: He is well-developed. He is not ill-appearing, toxic-appearing or diaphoretic.   Cardiovascular:      Pulses:           Dorsalis pedis pulses are 2+ on the left side.        Posterior tibial pulses are 1+ on the left side.      Comments: dorsalis pedis and posterior tibial pulses are palpable  + pedal hair growth         Musculoskeletal:         General: Swelling (stable ) present. No tenderness.      Right lower le+ Edema present.      Left lower le+ Edema present.      Left foot: Decreased range of motion. Deformity and prominent metatarsal heads present.      Comments: No pop or with " debridement       Feet:      Right foot:      Protective Sensation: 0 sites tested. 0 sites sensed.      Left foot:      Protective Sensation: 0 sites tested. 5 sites sensed.      Skin integrity: Ulcer present.   Skin:     General: Skin is warm and dry.      Findings: No erythema, lesion or rash.      Comments: Ulcer location:  0.7x0.6x0.2 cm , 0.3x0.2x0.1, 0.2x0.2x0.1 cm   Neurological:      Mental Status: He is alert and oriented to person, place, and time.      Gait: Gait abnormal.   Psychiatric:         Attention and Perception: Attention normal.         Mood and Affect: Mood normal.         Speech: Speech normal.         Behavior: Behavior normal. Behavior is cooperative.         Thought Content: Thought content normal.         Cognition and Memory: Cognition normal.         Judgment: Judgment normal.                                   Assessment:       Encounter Diagnoses   Name Primary?    Type 2 diabetes mellitus with hyperglycemia, with long-term current use of insulin Yes    Ulcer of right foot with fat layer exposed          Plan:       Indio was seen today for wound care, foot ulcer and dressing change.    Diagnoses and all orders for this visit:    Type 2 diabetes mellitus with hyperglycemia, with long-term current use of insulin    Ulcer of right foot with fat layer exposed      I counseled the patient on his conditions, their implications and medical management.    Debridement: With verbal consent, nonviable tissues on the left foot were debrided beyond sub q utilizing a  sterile No. 3 scalpel and forceps. Minimal bleeding controlled with direct pressure  The patient tolerated this well.     Dressings: [place]  Offloading:Foam    Follow-up:Patient is to return to the clinic in 1 week  for follow-up but should call [facility] immediately if any signs of infection, such as fever, chills, sweats, increased redness or pain.    Short-term goals include maintaining good offloading and minimizing bioburden,  promoting granulation and epithelialization to healing.  Long-term goals include keeping the wound healed by good offloading and medical management under the direction of internist.    Erma Gonzáles MA assisted w/ application of football dressing under my direct supervision. Darco shoe applied to offload the area. Advised patient that this should be worn on the affected foot at all times when ambulating     Follow-up:Patient is to return to the clinic in 1 week  for follow-up but should call [facility] immediately if any signs of infection, such as fever, chills, sweats, increased redness or pain.

## 2020-12-15 NOTE — PROGRESS NOTES
LINKS immunization registry not responding  Care Everywhere updated  Health Maintenance updated  Chart reviewed for overdue Proactive [facility] Encounters ([facility]) health maintenance testing (CRS, Breast Ca, Diabetic Eye Exam)   Orders entered:N/A

## 2020-12-17 ENCOUNTER — OFFICE VISIT (OUTPATIENT)
Dept: PODIATRY | Facility: CLINIC | Age: 50
End: 2020-12-17
Payer: MEDICAID

## 2020-12-17 DIAGNOSIS — E11.65 TYPE 2 DIABETES MELLITUS WITH HYPERGLYCEMIA, WITH LONG-TERM CURRENT USE OF INSULIN: Primary | ICD-10-CM

## 2020-12-17 DIAGNOSIS — Z79.4 TYPE 2 DIABETES MELLITUS WITH HYPERGLYCEMIA, WITH LONG-TERM CURRENT USE OF INSULIN: Primary | ICD-10-CM

## 2020-12-17 DIAGNOSIS — L97.512 ULCER OF RIGHT FOOT WITH FAT LAYER EXPOSED: ICD-10-CM

## 2020-12-17 PROCEDURE — 99213 PR OFFICE/OUTPT VISIT, EST, LEVL III, 20-29 MIN: ICD-10-PCS | Mod: S$PBB,,, | Performed by: PODIATRIST

## 2020-12-17 PROCEDURE — 99999 PR PBB SHADOW E&M-EST. PATIENT-LVL I: ICD-10-PCS | Mod: PBBFAC,,, | Performed by: PODIATRIST

## 2020-12-17 PROCEDURE — 99211 OFF/OP EST MAY X REQ PHY/QHP: CPT | Mod: PBBFAC | Performed by: PODIATRIST

## 2020-12-17 PROCEDURE — 99999 PR PBB SHADOW E&M-EST. PATIENT-LVL I: CPT | Mod: PBBFAC,,, | Performed by: PODIATRIST

## 2020-12-17 PROCEDURE — 99213 OFFICE O/P EST LOW 20 MIN: CPT | Mod: S$PBB,,, | Performed by: PODIATRIST

## 2020-12-18 NOTE — PROGRESS NOTES
Subjective:      Patient ID: Indio Ryder Jr. is a 50 y.o. male.    Chief Complaint: Wound Care (left foot ), Foot Ulcer, and Dressing Change    Indio is a 50 y.o. male who presents to the clinic for evaluation and treatment of high risk feet. 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 The patient's chief complaint is diabetic foot ulcer, L .    12.17.20- Patient has been in football dressing, ambulating in walking boot x 1 week with out issues     PCP: Lorena Thakur MD    Date Last Seen by PCP: 7/28/20  Chief Complaint   Patient presents with    Wound Care     left foot     Foot Ulcer    Dressing Change         Hemoglobin A1C   Date Value Ref Range Status   06/12/2020 7.2 (H) 4.0 - 5.6 % Final     Comment:     ADA Screening Guidelines:  5.7-6.4%  Consistent with prediabetes  >or=6.5%  Consistent with diabetes  High levels of fetal hemoglobin interfere with the HbA1C  assay. Heterozygous hemoglobin variants (HbS, HgC, etc)do  not significantly interfere with this assay.   However, presence of multiple variants may affect accuracy.     03/12/2020 12.0 (H) 4.0 - 5.6 % Final     Comment:     ADA Screening Guidelines:  5.7-6.4%  Consistent with prediabetes  >or=6.5%  Consistent with diabetes  High levels of fetal hemoglobin interfere with the HbA1C  assay. Heterozygous hemoglobin variants (HbS, HgC,  etc)do  not significantly interfere with this assay.   However, presence of multiple variants may affect accuracy.     2019 8.4 (H) 4.0 - 5.6 % Final     Comment:     ADA Screening Guidelines:  5.7-6.4%  Consistent with prediabetes  >or=6.5%  Consistent with diabetes  High levels of fetal hemoglobin interfere with the HbA1C  assay. Heterozygous hemoglobin variants (HbS, HgC, etc)do  not significantly interfere with this assay.   However, presence of multiple variants may affect accuracy.         Review of Systems   Constitution: Negative for chills, decreased appetite, diaphoresis, fever, malaise/fatigue, night sweats, weight gain and weight loss.   Cardiovascular: Negative for leg swelling.   Respiratory: Negative for cough.    Skin: Positive for dry skin and poor wound healing. Negative for flushing, itching and rash.   Musculoskeletal: Positive for neck pain. Negative for arthritis, joint pain, joint swelling and myalgias.   Gastrointestinal: Negative for nausea and vomiting.   Neurological: Positive for numbness. Negative for loss of balance and paresthesias.           Objective:       There were no vitals filed for this visit.     Physical Exam  Vitals signs reviewed.   Constitutional:       General: He is not in acute distress.     Appearance: He is well-developed. He is not ill-appearing, toxic-appearing or diaphoretic.   Cardiovascular:      Pulses:           Dorsalis pedis pulses are 2+ on the left side.        Posterior tibial pulses are 1+ on the left side.      Comments: dorsalis pedis and posterior tibial pulses are palpable  + pedal hair growth         Musculoskeletal:         General: Swelling (stable ) present. No tenderness.      Right lower le+ Edema present.      Left lower le+ Edema present.      Left foot: Decreased range of motion. Deformity and prominent metatarsal heads present.      Comments: No pop or with debridement       Feet:      Right foot:      Protective Sensation: 0  sites tested. 0 sites sensed.      Left foot:      Protective Sensation: 0 sites tested. 5 sites sensed.      Skin integrity: Ulcer present.   Skin:     General: Skin is warm and dry.      Findings: No erythema, lesion or rash.      Comments: Ulcer location:  0.4x0.5x0.2 cm , 0.3x0.2x0.1, 0.2x0.2x0.1 cm   Neurological:      Mental Status: He is alert and oriented to person, place, and time.      Gait: Gait abnormal.   Psychiatric:         Attention and Perception: Attention normal.         Mood and Affect: Mood normal.         Speech: Speech normal.         Behavior: Behavior normal. Behavior is cooperative.         Thought Content: Thought content normal.         Cognition and Memory: Cognition normal.         Judgment: Judgment normal.                                   Assessment:       Encounter Diagnoses   Name Primary?    Type 2 diabetes mellitus with hyperglycemia, with long-term current use of insulin Yes    Ulcer of right foot with fat layer exposed          Plan:       Indio was seen today for wound care, foot ulcer and dressing change.    Diagnoses and all orders for this visit:    Type 2 diabetes mellitus with hyperglycemia, with long-term current use of insulin    Ulcer of right foot with fat layer exposed      I counseled the patient on his conditions, their implications and medical management.    Debridement: area deeply cleansed with saline moistened gauze     Dressings: [place]  Offloading:Foam      Short-term goals include maintaining good offloading and minimizing bioburden, promoting granulation and epithelialization to healing.  Long-term goals include keeping the wound healed by good offloading and medical management under the direction of internist.    Erma Gonzáles MA assisted w/ application of football dressing under my direct supervision. Darco shoe applied to offload the area. Advised patient that this should be worn on the affected foot at all times when ambulating      Follow-up:Patient is to return to the clinic in 1 week  for follow-up but [facility] if any signs of infection, such as fever, chills, sweats, increased redness or pain.

## 2020-12-23 ENCOUNTER — OFFICE VISIT (OUTPATIENT)
Dept: PODIATRY | Facility: CLINIC | Age: 50
End: 2020-12-23
Payer: MEDICAID

## 2020-12-23 VITALS
WEIGHT: 235 LBS | HEIGHT: 73 IN | BODY MASS INDEX: 31.14 KG/M2 | HEART RATE: 93 BPM | SYSTOLIC BLOOD PRESSURE: 173 MMHG | DIASTOLIC BLOOD PRESSURE: 103 MMHG

## 2020-12-23 DIAGNOSIS — Z79.4 TYPE 2 DIABETES MELLITUS WITH HYPERGLYCEMIA, WITH LONG-TERM CURRENT USE OF INSULIN: Primary | ICD-10-CM

## 2020-12-23 DIAGNOSIS — L97.512 ULCER OF RIGHT FOOT WITH FAT LAYER EXPOSED: ICD-10-CM

## 2020-12-23 DIAGNOSIS — E11.65 TYPE 2 DIABETES MELLITUS WITH HYPERGLYCEMIA, WITH LONG-TERM CURRENT USE OF INSULIN: Primary | ICD-10-CM

## 2020-12-23 PROCEDURE — 99999 PR PBB SHADOW E&M-EST. PATIENT-LVL III: ICD-10-PCS | Mod: PBBFAC,,, | Performed by: PODIATRIST

## 2020-12-23 PROCEDURE — 99213 PR OFFICE/OUTPT VISIT, EST, LEVL III, 20-29 MIN: ICD-10-PCS | Mod: S$PBB,,, | Performed by: PODIATRIST

## 2020-12-23 PROCEDURE — 99999 PR PBB SHADOW E&M-EST. PATIENT-LVL III: CPT | Mod: PBBFAC,,, | Performed by: PODIATRIST

## 2020-12-23 PROCEDURE — 99213 OFFICE O/P EST LOW 20 MIN: CPT | Mod: S$PBB,,, | Performed by: PODIATRIST

## 2020-12-23 PROCEDURE — 99213 OFFICE O/P EST LOW 20 MIN: CPT | Mod: PBBFAC | Performed by: PODIATRIST

## 2020-12-30 ENCOUNTER — OFFICE VISIT (OUTPATIENT)
Dept: PODIATRY | Facility: CLINIC | Age: 50
End: 2020-12-30
Payer: MEDICAID

## 2020-12-30 ENCOUNTER — HOSPITAL ENCOUNTER (OUTPATIENT)
Dept: RADIOLOGY | Facility: HOSPITAL | Age: 50
Discharge: HOME | End: 2020-12-30
Attending: PODIATRIST
Payer: MEDICAID

## 2020-12-30 VITALS
RESPIRATION RATE: 18 BRPM | WEIGHT: 235 LBS | HEIGHT: 73 IN | DIASTOLIC BLOOD PRESSURE: 79 MMHG | BODY MASS INDEX: 31.14 KG/M2 | HEART RATE: 87 BPM | TEMPERATURE: 98 F | SYSTOLIC BLOOD PRESSURE: 127 MMHG

## 2020-12-30 DIAGNOSIS — L97.512 ULCER OF RIGHT FOOT WITH FAT LAYER EXPOSED: Primary | ICD-10-CM

## 2020-12-30 DIAGNOSIS — Z79.4 TYPE 2 DIABETES MELLITUS WITH HYPERGLYCEMIA, WITH LONG-TERM CURRENT USE OF INSULIN: ICD-10-CM

## 2020-12-30 DIAGNOSIS — L97.512 ULCER OF RIGHT FOOT WITH FAT LAYER EXPOSED: ICD-10-CM

## 2020-12-30 DIAGNOSIS — E11.65 TYPE 2 DIABETES MELLITUS WITH HYPERGLYCEMIA, WITH LONG-TERM CURRENT USE OF INSULIN: ICD-10-CM

## 2020-12-30 DIAGNOSIS — M86.9 OSTEOMYELITIS, UNSPECIFIED SITE, UNSPECIFIED TYPE: ICD-10-CM

## 2020-12-30 PROCEDURE — 87075 CULTR BACTERIA EXCEPT BLOOD: CPT

## 2020-12-30 PROCEDURE — 99999 PR PBB SHADOW E&M-EST. PATIENT-LVL III: CPT | Mod: PBBFAC,,, | Performed by: PODIATRIST

## 2020-12-30 PROCEDURE — 87070 CULTURE OTHR SPECIMN AEROBIC: CPT

## 2020-12-30 PROCEDURE — 73630 XR FOOT COMPLETE 3 VIEW LEFT: ICD-10-PCS | Mod: 26,LT,, | Performed by: RADIOLOGY

## 2020-12-30 PROCEDURE — 99214 OFFICE O/P EST MOD 30 MIN: CPT | Mod: 25,S$PBB,, | Performed by: PODIATRIST

## 2020-12-30 PROCEDURE — 73630 X-RAY EXAM OF FOOT: CPT | Mod: TC,LT

## 2020-12-30 PROCEDURE — 73630 X-RAY EXAM OF FOOT: CPT | Mod: 26,LT,, | Performed by: RADIOLOGY

## 2020-12-30 PROCEDURE — 11042 PR DEBRIDEMENT, SKIN, SUB-Q TISSUE,=<20 SQ CM: ICD-10-PCS | Mod: S$PBB,,, | Performed by: PODIATRIST

## 2020-12-30 PROCEDURE — 11042 DBRDMT SUBQ TIS 1ST 20SQCM/<: CPT | Mod: S$PBB,,, | Performed by: PODIATRIST

## 2020-12-30 PROCEDURE — 99999 PR PBB SHADOW E&M-EST. PATIENT-LVL III: ICD-10-PCS | Mod: PBBFAC,,, | Performed by: PODIATRIST

## 2020-12-30 PROCEDURE — 99213 OFFICE O/P EST LOW 20 MIN: CPT | Mod: PBBFAC,25 | Performed by: PODIATRIST

## 2020-12-30 PROCEDURE — 99214 PR OFFICE/OUTPT VISIT, EST, LEVL IV, 30-39 MIN: ICD-10-PCS | Mod: 25,S$PBB,, | Performed by: PODIATRIST

## 2020-12-30 PROCEDURE — 11042 DBRDMT SUBQ TIS 1ST 20SQCM/<: CPT | Mod: PBBFAC | Performed by: PODIATRIST

## 2020-12-30 RX ORDER — DOXYCYCLINE 100 MG/1
100 CAPSULE ORAL EVERY 12 HOURS
Qty: 20 CAPSULE | Refills: 0 | Status: SHIPPED | OUTPATIENT
Start: 2020-12-30 | End: 2021-01-09

## 2020-12-31 ENCOUNTER — PATIENT MESSAGE (OUTPATIENT)
Dept: PODIATRY | Facility: CLINIC | Age: 50
End: 2020-12-31

## 2020-12-31 NOTE — PROGRESS NOTES
Subjective:      Patient ID: Indio Ryder Jr. is a 50 y.o. male.    Chief Complaint: Wound Care (right foot )    Indio is a 50 y.o. male who presents to the clinic for evaluation and treatment of high risk feet. 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 The patient's chief complaint is diabetic foot ulcer, L .     Patient has been in football dressing, ambulating in walking boot x 1 week with out issues     PCP: Lorena Thakur MD    Date Last Seen by PCP: 7/28/20  Chief Complaint   Patient presents with    Wound Care     right foot          Hemoglobin A1C   Date Value Ref Range Status   06/12/2020 7.2 (H) 4.0 - 5.6 % Final     Comment:     ADA Screening Guidelines:  5.7-6.4%  Consistent with prediabetes  >or=6.5%  Consistent with diabetes  High levels of fetal hemoglobin interfere with the HbA1C  assay. Heterozygous hemoglobin variants (HbS, HgC, etc)do  not significantly interfere with this assay.   However, presence of multiple variants may affect accuracy.     03/12/2020 12.0 (H) 4.0 - 5.6 % Final     Comment:     ADA Screening Guidelines:  5.7-6.4%  Consistent with prediabetes  >or=6.5%  Consistent with diabetes  High levels of fetal hemoglobin interfere with the HbA1C  assay. Heterozygous hemoglobin variants (HbS, HgC, etc)do  not significantly interfere with this assay.   However, presence of  "multiple variants may affect accuracy.     2019 8.4 (H) 4.0 - 5.6 % Final     Comment:     ADA Screening Guidelines:  5.7-6.4%  Consistent with prediabetes  >or=6.5%  Consistent with diabetes  High levels of fetal hemoglobin interfere with the HbA1C  assay. Heterozygous hemoglobin variants (HbS, HgC, etc)do  not significantly interfere with this assay.   However, presence of multiple variants may affect accuracy.         Review of Systems   Constitution: Negative for chills, decreased appetite, diaphoresis, fever, malaise/fatigue, night sweats, weight gain and weight loss.   Cardiovascular: Negative for leg swelling.   Respiratory: Negative for cough.    Skin: Positive for dry skin and poor wound healing. Negative for flushing, itching and rash.   Musculoskeletal: Positive for neck pain. Negative for arthritis, joint pain, joint swelling and myalgias.   Gastrointestinal: Negative for nausea and vomiting.   Neurological: Positive for numbness. Negative for loss of balance and paresthesias.           Objective:       Vitals:    20 1112   BP: (!) 173/103   Pulse: 93   Weight: 106.6 kg (235 lb 0.2 oz)   Height: 6' 1" (1.854 m)   PainSc: 0-No pain        Physical Exam  Vitals signs reviewed.   Constitutional:       General: He is not in acute distress.     Appearance: He is well-developed. He is not ill-appearing, toxic-appearing or diaphoretic.   Cardiovascular:      Pulses:           Dorsalis pedis pulses are 2+ on the left side.        Posterior tibial pulses are 1+ on the left side.      Comments: dorsalis pedis and posterior tibial pulses are palpable  + pedal hair growth         Musculoskeletal:         General: Swelling (stable ) present. No tenderness.      Right lower le+ Edema present.      Left lower le+ Edema present.      Left foot: Decreased range of motion. Deformity and prominent metatarsal heads present.      Comments: No pop or with debridement       Feet:      Right foot:      " Protective Sensation: 0 sites tested. 0 sites sensed.      Left foot:      Protective Sensation: 0 sites tested. 5 sites sensed.      Skin integrity: Ulcer present.   Skin:     General: Skin is warm and dry.      Findings: No erythema, lesion or rash.      Comments: Ulcer location:  0.4x0.5x0.2 cm , 0.3x0.2x0.1cm   Neurological:      Mental Status: He is alert and oriented to person, place, and time.      Gait: Gait abnormal.   Psychiatric:         Attention and Perception: Attention normal.         Mood and Affect: Mood normal.         Speech: Speech normal.         Behavior: Behavior normal. Behavior is cooperative.         Thought Content: Thought content normal.         Cognition and Memory: Cognition normal.         Judgment: Judgment normal.                                   Assessment:       Encounter Diagnoses   Name Primary?    Type 2 diabetes mellitus with hyperglycemia, with long-term current use of insulin Yes    Ulcer of right foot with fat layer exposed          Plan:       Indio was seen today for wound care.    Diagnoses and all orders for this visit:    Type 2 diabetes mellitus with hyperglycemia, with long-term current use of insulin    Ulcer of right foot with fat layer exposed      I counseled the patient on his conditions, their implications and medical management.  Wound stable, slight increase in drainage, will switch to alginate   Debridement: area deeply cleansed with saline moistened gauze     Dressings: ag rope   Offloading:Foam      Short-term goals include maintaining good offloading and minimizing bioburden, promoting granulation and epithelialization to healing.  Long-term goals include keeping the wound healed by good offloading and medical management under the direction of internist.    Erma Gonzáles MA assisted w/ application of football dressing under my direct supervision. Darco shoe applied to offload the area. Advised patient that this should be worn on the affected foot at  all times when ambulating     Follow-up:Patient is to return to the clinic in 1 week  for follow-up but [facility] if any signs of infection, such as fever, chills, sweats, increased redness or pain.

## 2021-01-02 LAB — BACTERIA SPEC AEROBE CULT: NORMAL

## 2021-01-04 ENCOUNTER — TELEPHONE (OUTPATIENT)
Dept: PODIATRY | Facility: CLINIC | Age: 51
End: 2021-01-04

## 2021-01-04 ENCOUNTER — PATIENT MESSAGE (OUTPATIENT)
Dept: ADMINISTRATIVE | Facility: HOSPITAL | Age: 51
End: 2021-01-04

## 2021-01-05 LAB — BACTERIA SPEC ANAEROBE CULT: NORMAL

## 2021-01-07 ENCOUNTER — OFFICE VISIT (OUTPATIENT)
Dept: PODIATRY | Facility: CLINIC | Age: 51
End: 2021-01-07
Payer: MEDICAID

## 2021-01-07 ENCOUNTER — HOSPITAL ENCOUNTER (OUTPATIENT)
Dept: RADIOLOGY | Facility: HOSPITAL | Age: 51
Discharge: HOME | End: 2021-01-07
Attending: PODIATRIST
Payer: MEDICAID

## 2021-01-07 VITALS
WEIGHT: 235 LBS | HEART RATE: 89 BPM | DIASTOLIC BLOOD PRESSURE: 90 MMHG | HEIGHT: 73 IN | SYSTOLIC BLOOD PRESSURE: 171 MMHG | TEMPERATURE: 98 F | BODY MASS INDEX: 31.14 KG/M2

## 2021-01-07 DIAGNOSIS — L97.512 ULCER OF RIGHT FOOT WITH FAT LAYER EXPOSED: ICD-10-CM

## 2021-01-07 DIAGNOSIS — M86.9 OSTEOMYELITIS, UNSPECIFIED SITE, UNSPECIFIED TYPE: ICD-10-CM

## 2021-01-07 DIAGNOSIS — M86.9 OSTEOMYELITIS, UNSPECIFIED SITE, UNSPECIFIED TYPE: Primary | ICD-10-CM

## 2021-01-07 DIAGNOSIS — E11.65 TYPE 2 DIABETES MELLITUS WITH HYPERGLYCEMIA, WITH LONG-TERM CURRENT USE OF INSULIN: ICD-10-CM

## 2021-01-07 DIAGNOSIS — Z79.4 TYPE 2 DIABETES MELLITUS WITH HYPERGLYCEMIA, WITH LONG-TERM CURRENT USE OF INSULIN: ICD-10-CM

## 2021-01-07 PROCEDURE — 99214 PR OFFICE/OUTPT VISIT, EST, LEVL IV, 30-39 MIN: ICD-10-PCS | Mod: 25,S$PBB,, | Performed by: PODIATRIST

## 2021-01-07 PROCEDURE — 88305 TISSUE EXAM BY PATHOLOGIST: CPT | Mod: 26,,, | Performed by: PATHOLOGY

## 2021-01-07 PROCEDURE — 99999 PR PBB SHADOW E&M-EST. PATIENT-LVL III: CPT | Mod: PBBFAC,,, | Performed by: PODIATRIST

## 2021-01-07 PROCEDURE — 99213 OFFICE O/P EST LOW 20 MIN: CPT | Mod: PBBFAC,25 | Performed by: PODIATRIST

## 2021-01-07 PROCEDURE — 11042 DBRDMT SUBQ TIS 1ST 20SQCM/<: CPT | Mod: S$PBB,,, | Performed by: PODIATRIST

## 2021-01-07 PROCEDURE — 99214 OFFICE O/P EST MOD 30 MIN: CPT | Mod: 25,S$PBB,, | Performed by: PODIATRIST

## 2021-01-07 PROCEDURE — 73720 MRI FOOT (FOREFOOT) LEFT W W/O CONTRAST: ICD-10-PCS | Mod: 26,LT,, | Performed by: RADIOLOGY

## 2021-01-07 PROCEDURE — 73720 MRI LWR EXTREMITY W/O&W/DYE: CPT | Mod: TC,LT

## 2021-01-07 PROCEDURE — 87070 CULTURE OTHR SPECIMN AEROBIC: CPT

## 2021-01-07 PROCEDURE — 20220 PR BONE BIOPSY,TROCAR/NEEDLE SUPERF: ICD-10-PCS | Mod: 59,S$PBB,, | Performed by: PODIATRIST

## 2021-01-07 PROCEDURE — A9585 GADOBUTROL INJECTION: HCPCS | Performed by: PODIATRIST

## 2021-01-07 PROCEDURE — 87075 CULTR BACTERIA EXCEPT BLOOD: CPT

## 2021-01-07 PROCEDURE — 20220 BONE BIOPSY TROCAR/NDL SUPFC: CPT | Mod: 59,S$PBB,, | Performed by: PODIATRIST

## 2021-01-07 PROCEDURE — 25500020 PHARM REV CODE 255: Performed by: PODIATRIST

## 2021-01-07 PROCEDURE — 11042 PR DEBRIDEMENT, SKIN, SUB-Q TISSUE,=<20 SQ CM: ICD-10-PCS | Mod: S$PBB,,, | Performed by: PODIATRIST

## 2021-01-07 PROCEDURE — 88305 TISSUE EXAM BY PATHOLOGIST: ICD-10-PCS | Mod: 26,,, | Performed by: PATHOLOGY

## 2021-01-07 PROCEDURE — 20220 BONE BIOPSY TROCAR/NDL SUPFC: CPT | Mod: PBBFAC | Performed by: PODIATRIST

## 2021-01-07 PROCEDURE — 73720 MRI LWR EXTREMITY W/O&W/DYE: CPT | Mod: 26,LT,, | Performed by: RADIOLOGY

## 2021-01-07 PROCEDURE — 11042 DBRDMT SUBQ TIS 1ST 20SQCM/<: CPT | Mod: PBBFAC | Performed by: PODIATRIST

## 2021-01-07 PROCEDURE — 99999 PR PBB SHADOW E&M-EST. PATIENT-LVL III: ICD-10-PCS | Mod: PBBFAC,,, | Performed by: PODIATRIST

## 2021-01-07 PROCEDURE — 88305 TISSUE EXAM BY PATHOLOGIST: CPT | Performed by: PATHOLOGY

## 2021-01-07 RX ORDER — GADOBUTROL 604.72 MG/ML
10 INJECTION INTRAVENOUS
Status: COMPLETED | OUTPATIENT
Start: 2021-01-07 | End: 2021-01-07

## 2021-01-07 RX ADMIN — GADOBUTROL 10 ML: 604.72 INJECTION INTRAVENOUS at 06:01

## 2021-01-11 ENCOUNTER — TELEPHONE (OUTPATIENT)
Dept: PODIATRY | Facility: CLINIC | Age: 51
End: 2021-01-11

## 2021-01-11 LAB — BACTERIA SPEC AEROBE CULT: NO GROWTH

## 2021-01-12 ENCOUNTER — OFFICE VISIT (OUTPATIENT)
Dept: PODIATRY | Facility: CLINIC | Age: 51
End: 2021-01-12
Payer: MEDICAID

## 2021-01-12 VITALS — SYSTOLIC BLOOD PRESSURE: 182 MMHG | DIASTOLIC BLOOD PRESSURE: 105 MMHG | HEART RATE: 83 BPM | TEMPERATURE: 98 F

## 2021-01-12 DIAGNOSIS — Z79.4 TYPE 2 DIABETES MELLITUS WITH HYPERGLYCEMIA, WITH LONG-TERM CURRENT USE OF INSULIN: Primary | ICD-10-CM

## 2021-01-12 DIAGNOSIS — M86.9 OSTEOMYELITIS, UNSPECIFIED SITE, UNSPECIFIED TYPE: ICD-10-CM

## 2021-01-12 DIAGNOSIS — E11.65 TYPE 2 DIABETES MELLITUS WITH HYPERGLYCEMIA, WITH LONG-TERM CURRENT USE OF INSULIN: Primary | ICD-10-CM

## 2021-01-12 DIAGNOSIS — L97.512 ULCER OF RIGHT FOOT WITH FAT LAYER EXPOSED: ICD-10-CM

## 2021-01-12 PROCEDURE — 99499 NO LOS: ICD-10-PCS | Mod: S$PBB,,, | Performed by: PODIATRIST

## 2021-01-12 PROCEDURE — 99499 UNLISTED E&M SERVICE: CPT | Mod: S$PBB,,, | Performed by: PODIATRIST

## 2021-01-12 PROCEDURE — 11042 PR DEBRIDEMENT, SKIN, SUB-Q TISSUE,=<20 SQ CM: ICD-10-PCS | Mod: S$PBB,,, | Performed by: PODIATRIST

## 2021-01-12 PROCEDURE — 11042 DBRDMT SUBQ TIS 1ST 20SQCM/<: CPT | Mod: PBBFAC | Performed by: PODIATRIST

## 2021-01-12 PROCEDURE — 99999 PR PBB SHADOW E&M-EST. PATIENT-LVL III: ICD-10-PCS | Mod: PBBFAC,,, | Performed by: PODIATRIST

## 2021-01-12 PROCEDURE — 99213 OFFICE O/P EST LOW 20 MIN: CPT | Mod: PBBFAC,25 | Performed by: PODIATRIST

## 2021-01-12 PROCEDURE — 99999 PR PBB SHADOW E&M-EST. PATIENT-LVL III: CPT | Mod: PBBFAC,,, | Performed by: PODIATRIST

## 2021-01-12 PROCEDURE — 11042 DBRDMT SUBQ TIS 1ST 20SQCM/<: CPT | Mod: S$PBB,,, | Performed by: PODIATRIST

## 2021-01-13 LAB — BACTERIA SPEC ANAEROBE CULT: NORMAL

## 2021-01-14 LAB
FINAL PATHOLOGIC DIAGNOSIS: NORMAL
GROSS: NORMAL

## 2021-01-19 ENCOUNTER — PATIENT OUTREACH (OUTPATIENT)
Dept: ADMINISTRATIVE | Facility: OTHER | Age: 51
End: 2021-01-19

## 2021-01-20 ENCOUNTER — OFFICE VISIT (OUTPATIENT)
Dept: PODIATRY | Facility: CLINIC | Age: 51
End: 2021-01-20
Payer: MEDICAID

## 2021-01-20 VITALS
BODY MASS INDEX: 30.68 KG/M2 | RESPIRATION RATE: 19 BRPM | HEIGHT: 73 IN | WEIGHT: 231.5 LBS | SYSTOLIC BLOOD PRESSURE: 162 MMHG | TEMPERATURE: 98 F | DIASTOLIC BLOOD PRESSURE: 99 MMHG | HEART RATE: 89 BPM

## 2021-01-20 DIAGNOSIS — Z79.4 TYPE 2 DIABETES MELLITUS WITH HYPERGLYCEMIA, WITH LONG-TERM CURRENT USE OF INSULIN: Primary | ICD-10-CM

## 2021-01-20 DIAGNOSIS — L97.512 ULCER OF RIGHT FOOT WITH FAT LAYER EXPOSED: ICD-10-CM

## 2021-01-20 DIAGNOSIS — E11.65 TYPE 2 DIABETES MELLITUS WITH HYPERGLYCEMIA, WITH LONG-TERM CURRENT USE OF INSULIN: Primary | ICD-10-CM

## 2021-01-20 PROCEDURE — 99499 UNLISTED E&M SERVICE: CPT | Mod: S$PBB,,, | Performed by: PODIATRIST

## 2021-01-20 PROCEDURE — 11042 DBRDMT SUBQ TIS 1ST 20SQCM/<: CPT | Mod: PBBFAC | Performed by: PODIATRIST

## 2021-01-20 PROCEDURE — 99499 NO LOS: ICD-10-PCS | Mod: S$PBB,,, | Performed by: PODIATRIST

## 2021-01-20 PROCEDURE — 11042 DBRDMT SUBQ TIS 1ST 20SQCM/<: CPT | Mod: S$PBB,,, | Performed by: PODIATRIST

## 2021-01-20 PROCEDURE — 99999 PR PBB SHADOW E&M-EST. PATIENT-LVL III: CPT | Mod: PBBFAC,,, | Performed by: PODIATRIST

## 2021-01-20 PROCEDURE — 99213 OFFICE O/P EST LOW 20 MIN: CPT | Mod: PBBFAC | Performed by: PODIATRIST

## 2021-01-20 PROCEDURE — 99999 PR PBB SHADOW E&M-EST. PATIENT-LVL III: ICD-10-PCS | Mod: PBBFAC,,, | Performed by: PODIATRIST

## 2021-01-20 PROCEDURE — 11042 PR DEBRIDEMENT, SKIN, SUB-Q TISSUE,=<20 SQ CM: ICD-10-PCS | Mod: S$PBB,,, | Performed by: PODIATRIST

## 2021-01-27 ENCOUNTER — OFFICE VISIT (OUTPATIENT)
Dept: PODIATRY | Facility: CLINIC | Age: 51
End: 2021-01-27
Payer: MEDICAID

## 2021-01-27 VITALS
DIASTOLIC BLOOD PRESSURE: 94 MMHG | WEIGHT: 233 LBS | SYSTOLIC BLOOD PRESSURE: 160 MMHG | BODY MASS INDEX: 30.88 KG/M2 | HEART RATE: 83 BPM | HEIGHT: 73 IN | RESPIRATION RATE: 18 BRPM

## 2021-01-27 DIAGNOSIS — Z79.4 TYPE 2 DIABETES MELLITUS WITH HYPERGLYCEMIA, WITH LONG-TERM CURRENT USE OF INSULIN: Primary | ICD-10-CM

## 2021-01-27 DIAGNOSIS — L97.512 ULCER OF RIGHT FOOT WITH FAT LAYER EXPOSED: ICD-10-CM

## 2021-01-27 DIAGNOSIS — E11.65 TYPE 2 DIABETES MELLITUS WITH HYPERGLYCEMIA, WITH LONG-TERM CURRENT USE OF INSULIN: Primary | ICD-10-CM

## 2021-01-27 PROCEDURE — 11042 DBRDMT SUBQ TIS 1ST 20SQCM/<: CPT | Mod: S$PBB,,, | Performed by: PODIATRIST

## 2021-01-27 PROCEDURE — 11042 DBRDMT SUBQ TIS 1ST 20SQCM/<: CPT | Mod: PBBFAC | Performed by: PODIATRIST

## 2021-01-27 PROCEDURE — 99499 NO LOS: ICD-10-PCS | Mod: S$PBB,,, | Performed by: PODIATRIST

## 2021-01-27 PROCEDURE — 99499 UNLISTED E&M SERVICE: CPT | Mod: S$PBB,,, | Performed by: PODIATRIST

## 2021-01-27 PROCEDURE — 11042 PR DEBRIDEMENT, SKIN, SUB-Q TISSUE,=<20 SQ CM: ICD-10-PCS | Mod: S$PBB,,, | Performed by: PODIATRIST

## 2021-01-27 PROCEDURE — 99213 OFFICE O/P EST LOW 20 MIN: CPT | Mod: PBBFAC,25 | Performed by: PODIATRIST

## 2021-01-27 PROCEDURE — 99999 PR PBB SHADOW E&M-EST. PATIENT-LVL III: CPT | Mod: PBBFAC,,, | Performed by: PODIATRIST

## 2021-01-27 PROCEDURE — 99999 PR PBB SHADOW E&M-EST. PATIENT-LVL III: ICD-10-PCS | Mod: PBBFAC,,, | Performed by: PODIATRIST

## 2021-02-02 ENCOUNTER — OFFICE VISIT (OUTPATIENT)
Dept: PODIATRY | Facility: CLINIC | Age: 51
End: 2021-02-02
Payer: MEDICAID

## 2021-02-02 ENCOUNTER — TELEPHONE (OUTPATIENT)
Dept: OPTOMETRY | Facility: CLINIC | Age: 51
End: 2021-02-02

## 2021-02-02 VITALS
HEART RATE: 86 BPM | SYSTOLIC BLOOD PRESSURE: 167 MMHG | BODY MASS INDEX: 31.01 KG/M2 | WEIGHT: 234 LBS | DIASTOLIC BLOOD PRESSURE: 93 MMHG | RESPIRATION RATE: 18 BRPM | HEIGHT: 73 IN

## 2021-02-02 DIAGNOSIS — Z79.4 TYPE 2 DIABETES MELLITUS WITH HYPERGLYCEMIA, WITH LONG-TERM CURRENT USE OF INSULIN: Primary | ICD-10-CM

## 2021-02-02 DIAGNOSIS — E11.65 TYPE 2 DIABETES MELLITUS WITH HYPERGLYCEMIA, WITH LONG-TERM CURRENT USE OF INSULIN: Primary | ICD-10-CM

## 2021-02-02 DIAGNOSIS — L97.512 ULCER OF RIGHT FOOT WITH FAT LAYER EXPOSED: ICD-10-CM

## 2021-02-02 PROCEDURE — 11042 DBRDMT SUBQ TIS 1ST 20SQCM/<: CPT | Mod: S$PBB,,, | Performed by: PODIATRIST

## 2021-02-02 PROCEDURE — 99499 UNLISTED E&M SERVICE: CPT | Mod: S$PBB,,, | Performed by: PODIATRIST

## 2021-02-02 PROCEDURE — 99499 NO LOS: ICD-10-PCS | Mod: S$PBB,,, | Performed by: PODIATRIST

## 2021-02-02 PROCEDURE — 11042 DBRDMT SUBQ TIS 1ST 20SQCM/<: CPT | Mod: PBBFAC | Performed by: PODIATRIST

## 2021-02-02 PROCEDURE — 99999 PR PBB SHADOW E&M-EST. PATIENT-LVL III: CPT | Mod: PBBFAC,,, | Performed by: PODIATRIST

## 2021-02-02 PROCEDURE — 11042 PR DEBRIDEMENT, SKIN, SUB-Q TISSUE,=<20 SQ CM: ICD-10-PCS | Mod: S$PBB,,, | Performed by: PODIATRIST

## 2021-02-02 PROCEDURE — 99213 OFFICE O/P EST LOW 20 MIN: CPT | Mod: PBBFAC | Performed by: PODIATRIST

## 2021-02-02 PROCEDURE — 99999 PR PBB SHADOW E&M-EST. PATIENT-LVL III: ICD-10-PCS | Mod: PBBFAC,,, | Performed by: PODIATRIST

## 2021-02-09 ENCOUNTER — OFFICE VISIT (OUTPATIENT)
Dept: PODIATRY | Facility: CLINIC | Age: 51
End: 2021-02-09
Payer: MEDICAID

## 2021-02-09 VITALS
HEART RATE: 95 BPM | HEIGHT: 73 IN | SYSTOLIC BLOOD PRESSURE: 134 MMHG | DIASTOLIC BLOOD PRESSURE: 75 MMHG | BODY MASS INDEX: 31 KG/M2 | WEIGHT: 233.94 LBS

## 2021-02-09 DIAGNOSIS — E11.65 TYPE 2 DIABETES MELLITUS WITH HYPERGLYCEMIA, WITH LONG-TERM CURRENT USE OF INSULIN: Primary | ICD-10-CM

## 2021-02-09 DIAGNOSIS — L97.512 ULCER OF RIGHT FOOT WITH FAT LAYER EXPOSED: ICD-10-CM

## 2021-02-09 DIAGNOSIS — Z79.4 TYPE 2 DIABETES MELLITUS WITH HYPERGLYCEMIA, WITH LONG-TERM CURRENT USE OF INSULIN: Primary | ICD-10-CM

## 2021-02-09 PROCEDURE — 99999 PR PBB SHADOW E&M-EST. PATIENT-LVL III: CPT | Mod: PBBFAC,,, | Performed by: PODIATRIST

## 2021-02-09 PROCEDURE — 11042 PR DEBRIDEMENT, SKIN, SUB-Q TISSUE,=<20 SQ CM: ICD-10-PCS | Mod: S$PBB,,, | Performed by: PODIATRIST

## 2021-02-09 PROCEDURE — 11042 DBRDMT SUBQ TIS 1ST 20SQCM/<: CPT | Mod: S$PBB,,, | Performed by: PODIATRIST

## 2021-02-09 PROCEDURE — 99499 NO LOS: ICD-10-PCS | Mod: S$PBB,,, | Performed by: PODIATRIST

## 2021-02-09 PROCEDURE — 99999 PR PBB SHADOW E&M-EST. PATIENT-LVL III: ICD-10-PCS | Mod: PBBFAC,,, | Performed by: PODIATRIST

## 2021-02-09 PROCEDURE — 11042 DBRDMT SUBQ TIS 1ST 20SQCM/<: CPT | Mod: PBBFAC | Performed by: PODIATRIST

## 2021-02-09 PROCEDURE — 99213 OFFICE O/P EST LOW 20 MIN: CPT | Mod: PBBFAC,25 | Performed by: PODIATRIST

## 2021-02-09 PROCEDURE — 99499 UNLISTED E&M SERVICE: CPT | Mod: S$PBB,,, | Performed by: PODIATRIST

## 2021-02-18 ENCOUNTER — OFFICE VISIT (OUTPATIENT)
Dept: PODIATRY | Facility: CLINIC | Age: 51
End: 2021-02-18
Payer: MEDICAID

## 2021-02-18 VITALS
DIASTOLIC BLOOD PRESSURE: 91 MMHG | HEART RATE: 90 BPM | RESPIRATION RATE: 18 BRPM | WEIGHT: 233 LBS | SYSTOLIC BLOOD PRESSURE: 154 MMHG | BODY MASS INDEX: 30.88 KG/M2 | HEIGHT: 73 IN

## 2021-02-18 DIAGNOSIS — L97.512 ULCER OF RIGHT FOOT WITH FAT LAYER EXPOSED: ICD-10-CM

## 2021-02-18 DIAGNOSIS — Z79.4 TYPE 2 DIABETES MELLITUS WITH HYPERGLYCEMIA, WITH LONG-TERM CURRENT USE OF INSULIN: Primary | ICD-10-CM

## 2021-02-18 DIAGNOSIS — E11.65 TYPE 2 DIABETES MELLITUS WITH HYPERGLYCEMIA, WITH LONG-TERM CURRENT USE OF INSULIN: Primary | ICD-10-CM

## 2021-02-18 PROCEDURE — 99999 PR PBB SHADOW E&M-EST. PATIENT-LVL III: CPT | Mod: PBBFAC,,, | Performed by: PODIATRIST

## 2021-02-18 PROCEDURE — 99213 OFFICE O/P EST LOW 20 MIN: CPT | Mod: PBBFAC | Performed by: PODIATRIST

## 2021-02-18 PROCEDURE — 99213 PR OFFICE/OUTPT VISIT, EST, LEVL III, 20-29 MIN: ICD-10-PCS | Mod: S$PBB,,, | Performed by: PODIATRIST

## 2021-02-18 PROCEDURE — 99999 PR PBB SHADOW E&M-EST. PATIENT-LVL III: ICD-10-PCS | Mod: PBBFAC,,, | Performed by: PODIATRIST

## 2021-02-18 PROCEDURE — 99213 OFFICE O/P EST LOW 20 MIN: CPT | Mod: S$PBB,,, | Performed by: PODIATRIST

## 2021-02-23 ENCOUNTER — PATIENT OUTREACH (OUTPATIENT)
Dept: ADMINISTRATIVE | Facility: OTHER | Age: 51
End: 2021-02-23

## 2021-02-25 ENCOUNTER — OFFICE VISIT (OUTPATIENT)
Dept: PODIATRY | Facility: CLINIC | Age: 51
End: 2021-02-25
Payer: MEDICAID

## 2021-02-25 VITALS
BODY MASS INDEX: 31.14 KG/M2 | WEIGHT: 235 LBS | RESPIRATION RATE: 18 BRPM | HEART RATE: 88 BPM | SYSTOLIC BLOOD PRESSURE: 133 MMHG | DIASTOLIC BLOOD PRESSURE: 78 MMHG | HEIGHT: 73 IN

## 2021-02-25 DIAGNOSIS — Z86.31 HEALED DIABETIC ULCER OF FOOT: ICD-10-CM

## 2021-02-25 DIAGNOSIS — Z79.4 TYPE 2 DIABETES MELLITUS WITH HYPERGLYCEMIA, WITH LONG-TERM CURRENT USE OF INSULIN: Primary | ICD-10-CM

## 2021-02-25 DIAGNOSIS — E11.65 TYPE 2 DIABETES MELLITUS WITH HYPERGLYCEMIA, WITH LONG-TERM CURRENT USE OF INSULIN: Primary | ICD-10-CM

## 2021-02-25 PROCEDURE — 99999 PR PBB SHADOW E&M-EST. PATIENT-LVL III: ICD-10-PCS | Mod: PBBFAC,,, | Performed by: PODIATRIST

## 2021-02-25 PROCEDURE — 99999 PR PBB SHADOW E&M-EST. PATIENT-LVL III: CPT | Mod: PBBFAC,,, | Performed by: PODIATRIST

## 2021-02-25 PROCEDURE — 99213 OFFICE O/P EST LOW 20 MIN: CPT | Mod: S$PBB,,, | Performed by: PODIATRIST

## 2021-02-25 PROCEDURE — 99213 PR OFFICE/OUTPT VISIT, EST, LEVL III, 20-29 MIN: ICD-10-PCS | Mod: S$PBB,,, | Performed by: PODIATRIST

## 2021-02-25 PROCEDURE — 99213 OFFICE O/P EST LOW 20 MIN: CPT | Mod: PBBFAC | Performed by: PODIATRIST

## 2021-03-01 NOTE — CONSULTS
A nurse will call patient to initiate transitional care management phone call. Patient was discharged from [facility] on 2/26/21. Per hospital discharge summary, patient is to: Follow up with:  -BMP lab on 3/5/21 at 10:15 AM    -Thomas Rowe NP, on 3/5/21 at 11 AM. COVID 19 test performed 2/25/21; test negative     *A nurse will update medication list once medications are verified with patient. New or changed medications:   Â· betamethasone dipropionate 0.05 % ointment, apply to scalp once a day as needed, up to 10 days per treatment. It is noted patient has clobetasol 0.05% on Epic medication list for this treatment. Will verify patient's current cream/ointment that she is using and update med list accordingly. Â· Co-enzyme Q10 dose needs to be verified. [facility] discharge summary states 120 mg daily; Epic med list states she is taking 200 mg daily. Â· Flonase frequency needs to be verified with patient. [facility] discharge summary states 2 sprays daily; Epic med list states 2 sprays every 3 days as needed. Â· Probiotic & Acidophilus capsule, 1 capsule by mouth as needed listed on [facility] discharge summary. Will verify if patient is taking this and will update med list in Epic accordingly.        Admit date: 2/25/21  Discharge date: 2/26/21  Diagnosis:   COVID 19 test performed 2/25/21; test negative   Acute chest pain  Hypokalemia  Pure hypercholesterolemia 12/18/2019   Fibromyalgia   Migraine with aura and without status migrainosus Wound care consulted for left foot wound vac change.    The wound vac dressing is being followed and changed per podiatry- see Dr. Magallon's note on 7/22/2019 @ 11:12am   Wound care signing off.

## 2021-03-02 ENCOUNTER — HOSPITAL ENCOUNTER (OUTPATIENT)
Dept: RADIOLOGY | Facility: HOSPITAL | Age: 51
Discharge: HOME | End: 2021-03-02
Attending: PODIATRIST
Payer: MEDICAID

## 2021-03-02 DIAGNOSIS — L97.524 FOOT ULCER, LEFT, WITH NECROSIS OF BONE: ICD-10-CM

## 2021-03-02 PROCEDURE — 73630 X-RAY EXAM OF FOOT: CPT | Mod: TC,LT

## 2021-03-02 PROCEDURE — 73630 XR FOOT COMPLETE 3 VIEW LEFT: ICD-10-PCS | Mod: 26,LT,, | Performed by: RADIOLOGY

## 2021-03-02 PROCEDURE — 73630 X-RAY EXAM OF FOOT: CPT | Mod: 26,LT,, | Performed by: RADIOLOGY

## 2021-03-04 ENCOUNTER — OFFICE VISIT (OUTPATIENT)
Dept: PODIATRY | Facility: CLINIC | Age: 51
End: 2021-03-04
Payer: MEDICAID

## 2021-03-04 VITALS
SYSTOLIC BLOOD PRESSURE: 164 MMHG | BODY MASS INDEX: 31.14 KG/M2 | RESPIRATION RATE: 18 BRPM | HEART RATE: 80 BPM | HEIGHT: 73 IN | WEIGHT: 235 LBS | DIASTOLIC BLOOD PRESSURE: 96 MMHG

## 2021-03-04 DIAGNOSIS — Z79.4 TYPE 2 DIABETES MELLITUS WITH HYPERGLYCEMIA, WITH LONG-TERM CURRENT USE OF INSULIN: Primary | ICD-10-CM

## 2021-03-04 DIAGNOSIS — Z86.31 HEALED DIABETIC ULCER OF FOOT: ICD-10-CM

## 2021-03-04 DIAGNOSIS — E11.65 TYPE 2 DIABETES MELLITUS WITH HYPERGLYCEMIA, WITH LONG-TERM CURRENT USE OF INSULIN: Primary | ICD-10-CM

## 2021-03-04 PROCEDURE — 99213 PR OFFICE/OUTPT VISIT, EST, LEVL III, 20-29 MIN: ICD-10-PCS | Mod: S$PBB,,, | Performed by: PODIATRIST

## 2021-03-04 PROCEDURE — 99999 PR PBB SHADOW E&M-EST. PATIENT-LVL III: CPT | Mod: PBBFAC,,, | Performed by: PODIATRIST

## 2021-03-04 PROCEDURE — 99213 OFFICE O/P EST LOW 20 MIN: CPT | Mod: S$PBB,,, | Performed by: PODIATRIST

## 2021-03-04 PROCEDURE — 99213 OFFICE O/P EST LOW 20 MIN: CPT | Mod: PBBFAC | Performed by: PODIATRIST

## 2021-03-04 PROCEDURE — 99999 PR PBB SHADOW E&M-EST. PATIENT-LVL III: ICD-10-PCS | Mod: PBBFAC,,, | Performed by: PODIATRIST

## 2021-03-18 ENCOUNTER — OFFICE VISIT (OUTPATIENT)
Dept: PODIATRY | Facility: CLINIC | Age: 51
End: 2021-03-18
Payer: MEDICAID

## 2021-03-18 VITALS
HEART RATE: 90 BPM | DIASTOLIC BLOOD PRESSURE: 88 MMHG | SYSTOLIC BLOOD PRESSURE: 146 MMHG | RESPIRATION RATE: 18 BRPM | BODY MASS INDEX: 31.14 KG/M2 | HEIGHT: 73 IN | WEIGHT: 235 LBS

## 2021-03-18 DIAGNOSIS — L97.521 ULCER OF LEFT FOOT, LIMITED TO BREAKDOWN OF SKIN: ICD-10-CM

## 2021-03-18 DIAGNOSIS — Z79.4 TYPE 2 DIABETES MELLITUS WITH HYPERGLYCEMIA, WITH LONG-TERM CURRENT USE OF INSULIN: Primary | ICD-10-CM

## 2021-03-18 DIAGNOSIS — E11.65 TYPE 2 DIABETES MELLITUS WITH HYPERGLYCEMIA, WITH LONG-TERM CURRENT USE OF INSULIN: Primary | ICD-10-CM

## 2021-03-18 PROCEDURE — 99213 OFFICE O/P EST LOW 20 MIN: CPT | Mod: 25,S$PBB,, | Performed by: PODIATRIST

## 2021-03-18 PROCEDURE — 97597 DBRDMT OPN WND 1ST 20 CM/<: CPT | Mod: PBBFAC | Performed by: PODIATRIST

## 2021-03-18 PROCEDURE — 99213 PR OFFICE/OUTPT VISIT, EST, LEVL III, 20-29 MIN: ICD-10-PCS | Mod: 25,S$PBB,, | Performed by: PODIATRIST

## 2021-03-18 PROCEDURE — 99999 PR PBB SHADOW E&M-EST. PATIENT-LVL III: CPT | Mod: PBBFAC,,, | Performed by: PODIATRIST

## 2021-03-18 PROCEDURE — 99999 PR PBB SHADOW E&M-EST. PATIENT-LVL III: ICD-10-PCS | Mod: PBBFAC,,, | Performed by: PODIATRIST

## 2021-03-18 PROCEDURE — 99213 OFFICE O/P EST LOW 20 MIN: CPT | Mod: PBBFAC,25 | Performed by: PODIATRIST

## 2021-03-18 PROCEDURE — 97597 DBRDMT OPN WND 1ST 20 CM/<: CPT | Mod: S$PBB,,, | Performed by: PODIATRIST

## 2021-03-18 PROCEDURE — 97597 PR DEBRIDEMENT OPEN WOUND 20 SQ CM<: ICD-10-PCS | Mod: S$PBB,,, | Performed by: PODIATRIST

## 2021-03-23 ENCOUNTER — OFFICE VISIT (OUTPATIENT)
Dept: OPHTHALMOLOGY | Facility: CLINIC | Age: 51
End: 2021-03-23
Payer: MEDICAID

## 2021-03-23 DIAGNOSIS — H35.033 HYPERTENSIVE RETINOPATHY, BILATERAL: ICD-10-CM

## 2021-03-23 PROCEDURE — 92134 POSTERIOR SEGMENT OCT RETINA (OCULAR COHERENCE TOMOGRAPHY)-BOTH EYES: ICD-10-PCS | Mod: 26,S$PBB,, | Performed by: OPHTHALMOLOGY

## 2021-03-23 PROCEDURE — 99999 PR PBB SHADOW E&M-EST. PATIENT-LVL III: CPT | Mod: PBBFAC,,, | Performed by: OPHTHALMOLOGY

## 2021-03-23 PROCEDURE — 99213 OFFICE O/P EST LOW 20 MIN: CPT | Mod: PBBFAC,25 | Performed by: OPHTHALMOLOGY

## 2021-03-23 PROCEDURE — 92014 COMPRE OPH EXAM EST PT 1/>: CPT | Mod: S$PBB,,, | Performed by: OPHTHALMOLOGY

## 2021-03-23 PROCEDURE — 92014 PR EYE EXAM, EST PATIENT,COMPREHESV: ICD-10-PCS | Mod: S$PBB,,, | Performed by: OPHTHALMOLOGY

## 2021-03-23 PROCEDURE — 99999 PR PBB SHADOW E&M-EST. PATIENT-LVL III: ICD-10-PCS | Mod: PBBFAC,,, | Performed by: OPHTHALMOLOGY

## 2021-03-23 PROCEDURE — 92134 CPTRZ OPH DX IMG PST SGM RTA: CPT | Mod: PBBFAC | Performed by: OPHTHALMOLOGY

## 2021-03-30 ENCOUNTER — PATIENT OUTREACH (OUTPATIENT)
Dept: ADMINISTRATIVE | Facility: OTHER | Age: 51
End: 2021-03-30

## 2021-03-31 DIAGNOSIS — E11.9 TYPE 2 DIABETES MELLITUS WITHOUT COMPLICATION: ICD-10-CM

## 2021-04-01 ENCOUNTER — OFFICE VISIT (OUTPATIENT)
Dept: PODIATRY | Facility: CLINIC | Age: 51
End: 2021-04-01
Payer: MEDICAID

## 2021-04-01 VITALS
WEIGHT: 224 LBS | HEART RATE: 87 BPM | RESPIRATION RATE: 18 BRPM | DIASTOLIC BLOOD PRESSURE: 91 MMHG | SYSTOLIC BLOOD PRESSURE: 156 MMHG | BODY MASS INDEX: 29.69 KG/M2 | HEIGHT: 73 IN

## 2021-04-01 DIAGNOSIS — E11.65 TYPE 2 DIABETES MELLITUS WITH HYPERGLYCEMIA, WITH LONG-TERM CURRENT USE OF INSULIN: Primary | ICD-10-CM

## 2021-04-01 DIAGNOSIS — Z79.4 TYPE 2 DIABETES MELLITUS WITH HYPERGLYCEMIA, WITH LONG-TERM CURRENT USE OF INSULIN: Primary | ICD-10-CM

## 2021-04-01 DIAGNOSIS — L97.512 ULCER OF RIGHT FOOT WITH FAT LAYER EXPOSED: ICD-10-CM

## 2021-04-01 DIAGNOSIS — L97.521 ULCER OF LEFT FOOT, LIMITED TO BREAKDOWN OF SKIN: ICD-10-CM

## 2021-04-01 DIAGNOSIS — T14.8XXA BLOOD BLISTER: ICD-10-CM

## 2021-04-01 PROCEDURE — 97597 PR DEBRIDEMENT OPEN WOUND 20 SQ CM<: ICD-10-PCS | Mod: 59,S$PBB,, | Performed by: PODIATRIST

## 2021-04-01 PROCEDURE — 99999 PR PBB SHADOW E&M-EST. PATIENT-LVL III: CPT | Mod: PBBFAC,,, | Performed by: PODIATRIST

## 2021-04-01 PROCEDURE — 11042 DBRDMT SUBQ TIS 1ST 20SQCM/<: CPT | Mod: PBBFAC | Performed by: PODIATRIST

## 2021-04-01 PROCEDURE — 99213 OFFICE O/P EST LOW 20 MIN: CPT | Mod: PBBFAC,25 | Performed by: PODIATRIST

## 2021-04-01 PROCEDURE — 99499 NO LOS: ICD-10-PCS | Mod: S$PBB,,, | Performed by: PODIATRIST

## 2021-04-01 PROCEDURE — 97597 DBRDMT OPN WND 1ST 20 CM/<: CPT | Mod: 59,S$PBB,, | Performed by: PODIATRIST

## 2021-04-01 PROCEDURE — 97597 DBRDMT OPN WND 1ST 20 CM/<: CPT | Mod: 59,PBBFAC | Performed by: PODIATRIST

## 2021-04-01 PROCEDURE — 11042 PR DEBRIDEMENT, SKIN, SUB-Q TISSUE,=<20 SQ CM: ICD-10-PCS | Mod: S$PBB,,, | Performed by: PODIATRIST

## 2021-04-01 PROCEDURE — 99499 UNLISTED E&M SERVICE: CPT | Mod: S$PBB,,, | Performed by: PODIATRIST

## 2021-04-01 PROCEDURE — 11042 DBRDMT SUBQ TIS 1ST 20SQCM/<: CPT | Mod: S$PBB,,, | Performed by: PODIATRIST

## 2021-04-01 PROCEDURE — 99999 PR PBB SHADOW E&M-EST. PATIENT-LVL III: ICD-10-PCS | Mod: PBBFAC,,, | Performed by: PODIATRIST

## 2021-04-05 ENCOUNTER — PATIENT MESSAGE (OUTPATIENT)
Dept: ADMINISTRATIVE | Facility: HOSPITAL | Age: 51
End: 2021-04-05

## 2021-04-07 ENCOUNTER — PATIENT MESSAGE (OUTPATIENT)
Dept: PODIATRY | Facility: CLINIC | Age: 51
End: 2021-04-07

## 2021-04-08 ENCOUNTER — OFFICE VISIT (OUTPATIENT)
Dept: PODIATRY | Facility: CLINIC | Age: 51
End: 2021-04-08
Payer: MEDICAID

## 2021-04-08 VITALS
DIASTOLIC BLOOD PRESSURE: 105 MMHG | HEART RATE: 89 BPM | SYSTOLIC BLOOD PRESSURE: 181 MMHG | WEIGHT: 230.38 LBS | TEMPERATURE: 99 F | BODY MASS INDEX: 30.4 KG/M2

## 2021-04-08 DIAGNOSIS — L97.511 ULCER OF RIGHT FOOT, LIMITED TO BREAKDOWN OF SKIN: ICD-10-CM

## 2021-04-08 DIAGNOSIS — L97.521 ULCER OF LEFT FOOT, LIMITED TO BREAKDOWN OF SKIN: ICD-10-CM

## 2021-04-08 DIAGNOSIS — Z79.4 TYPE 2 DIABETES MELLITUS WITH HYPERGLYCEMIA, WITH LONG-TERM CURRENT USE OF INSULIN: Primary | ICD-10-CM

## 2021-04-08 DIAGNOSIS — E11.65 TYPE 2 DIABETES MELLITUS WITH HYPERGLYCEMIA, WITH LONG-TERM CURRENT USE OF INSULIN: Primary | ICD-10-CM

## 2021-04-08 PROCEDURE — 99499 NO LOS: ICD-10-PCS | Mod: S$PBB,,, | Performed by: PODIATRIST

## 2021-04-08 PROCEDURE — 11042 DBRDMT SUBQ TIS 1ST 20SQCM/<: CPT | Mod: PBBFAC,LT | Performed by: PODIATRIST

## 2021-04-08 PROCEDURE — 11042 PR DEBRIDEMENT, SKIN, SUB-Q TISSUE,=<20 SQ CM: ICD-10-PCS | Mod: S$PBB,,, | Performed by: PODIATRIST

## 2021-04-08 PROCEDURE — 99213 OFFICE O/P EST LOW 20 MIN: CPT | Mod: PBBFAC | Performed by: PODIATRIST

## 2021-04-08 PROCEDURE — 11042 DBRDMT SUBQ TIS 1ST 20SQCM/<: CPT | Mod: S$PBB,,, | Performed by: PODIATRIST

## 2021-04-08 PROCEDURE — 99999 PR PBB SHADOW E&M-EST. PATIENT-LVL III: CPT | Mod: PBBFAC,,, | Performed by: PODIATRIST

## 2021-04-08 PROCEDURE — 97597 DBRDMT OPN WND 1ST 20 CM/<: CPT | Mod: PBBFAC | Performed by: PODIATRIST

## 2021-04-08 PROCEDURE — 99499 UNLISTED E&M SERVICE: CPT | Mod: S$PBB,,, | Performed by: PODIATRIST

## 2021-04-08 PROCEDURE — 97597 DBRDMT OPN WND 1ST 20 CM/<: CPT | Mod: 59,S$PBB,, | Performed by: PODIATRIST

## 2021-04-08 PROCEDURE — 97597 PR DEBRIDEMENT OPEN WOUND 20 SQ CM<: ICD-10-PCS | Mod: 59,S$PBB,, | Performed by: PODIATRIST

## 2021-04-08 PROCEDURE — 99999 PR PBB SHADOW E&M-EST. PATIENT-LVL III: ICD-10-PCS | Mod: PBBFAC,,, | Performed by: PODIATRIST

## 2021-04-14 ENCOUNTER — OFFICE VISIT (OUTPATIENT)
Dept: PODIATRY | Facility: CLINIC | Age: 51
End: 2021-04-14
Payer: MEDICAID

## 2021-04-14 VITALS
BODY MASS INDEX: 30.48 KG/M2 | TEMPERATURE: 98 F | DIASTOLIC BLOOD PRESSURE: 92 MMHG | HEART RATE: 88 BPM | WEIGHT: 230 LBS | HEIGHT: 73 IN | SYSTOLIC BLOOD PRESSURE: 138 MMHG

## 2021-04-14 DIAGNOSIS — E11.65 TYPE 2 DIABETES MELLITUS WITH HYPERGLYCEMIA, WITH LONG-TERM CURRENT USE OF INSULIN: ICD-10-CM

## 2021-04-14 DIAGNOSIS — L97.521 ULCER OF LEFT FOOT, LIMITED TO BREAKDOWN OF SKIN: Primary | ICD-10-CM

## 2021-04-14 DIAGNOSIS — L97.511 ULCER OF RIGHT FOOT, LIMITED TO BREAKDOWN OF SKIN: ICD-10-CM

## 2021-04-14 DIAGNOSIS — Z79.4 TYPE 2 DIABETES MELLITUS WITH HYPERGLYCEMIA, WITH LONG-TERM CURRENT USE OF INSULIN: ICD-10-CM

## 2021-04-14 PROCEDURE — 11042 DBRDMT SUBQ TIS 1ST 20SQCM/<: CPT | Mod: PBBFAC | Performed by: PODIATRIST

## 2021-04-14 PROCEDURE — 99214 PR OFFICE/OUTPT VISIT, EST, LEVL IV, 30-39 MIN: ICD-10-PCS | Mod: 25,S$PBB,, | Performed by: PODIATRIST

## 2021-04-14 PROCEDURE — 11042 DBRDMT SUBQ TIS 1ST 20SQCM/<: CPT | Mod: S$PBB,,, | Performed by: PODIATRIST

## 2021-04-14 PROCEDURE — 87186 SC STD MICRODIL/AGAR DIL: CPT | Mod: 59 | Performed by: PODIATRIST

## 2021-04-14 PROCEDURE — 87070 CULTURE OTHR SPECIMN AEROBIC: CPT | Performed by: PODIATRIST

## 2021-04-14 PROCEDURE — 87077 CULTURE AEROBIC IDENTIFY: CPT | Mod: 59 | Performed by: PODIATRIST

## 2021-04-14 PROCEDURE — 87075 CULTR BACTERIA EXCEPT BLOOD: CPT | Performed by: PODIATRIST

## 2021-04-14 PROCEDURE — 87076 CULTURE ANAEROBE IDENT EACH: CPT | Performed by: PODIATRIST

## 2021-04-14 PROCEDURE — 99999 PR PBB SHADOW E&M-EST. PATIENT-LVL IV: CPT | Mod: PBBFAC,,, | Performed by: PODIATRIST

## 2021-04-14 PROCEDURE — 99214 OFFICE O/P EST MOD 30 MIN: CPT | Mod: 25,S$PBB,, | Performed by: PODIATRIST

## 2021-04-14 PROCEDURE — 99999 PR PBB SHADOW E&M-EST. PATIENT-LVL IV: ICD-10-PCS | Mod: PBBFAC,,, | Performed by: PODIATRIST

## 2021-04-14 PROCEDURE — 99214 OFFICE O/P EST MOD 30 MIN: CPT | Mod: PBBFAC | Performed by: PODIATRIST

## 2021-04-14 PROCEDURE — 11042 PR DEBRIDEMENT, SKIN, SUB-Q TISSUE,=<20 SQ CM: ICD-10-PCS | Mod: S$PBB,,, | Performed by: PODIATRIST

## 2021-04-16 ENCOUNTER — PATIENT MESSAGE (OUTPATIENT)
Dept: PODIATRY | Facility: CLINIC | Age: 51
End: 2021-04-16

## 2021-04-16 RX ORDER — AMOXICILLIN AND CLAVULANATE POTASSIUM 875; 125 MG/1; MG/1
1 TABLET, FILM COATED ORAL EVERY 12 HOURS
Qty: 20 TABLET | Refills: 0 | Status: SHIPPED | OUTPATIENT
Start: 2021-04-16 | End: 2021-04-26

## 2021-04-18 LAB — BACTERIA SPEC AEROBE CULT: ABNORMAL

## 2021-04-19 ENCOUNTER — PATIENT MESSAGE (OUTPATIENT)
Dept: PODIATRY | Facility: CLINIC | Age: 51
End: 2021-04-19

## 2021-04-19 RX ORDER — CIPROFLOXACIN 500 MG/1
500 TABLET ORAL EVERY 12 HOURS
Qty: 20 TABLET | Refills: 0 | Status: ON HOLD | OUTPATIENT
Start: 2021-04-19 | End: 2021-05-01 | Stop reason: HOSPADM

## 2021-04-20 LAB — BACTERIA SPEC ANAEROBE CULT: NORMAL

## 2021-04-21 ENCOUNTER — OFFICE VISIT (OUTPATIENT)
Dept: PODIATRY | Facility: CLINIC | Age: 51
End: 2021-04-21
Payer: MEDICAID

## 2021-04-21 VITALS
WEIGHT: 229.94 LBS | HEIGHT: 73 IN | BODY MASS INDEX: 30.47 KG/M2 | HEART RATE: 113 BPM | DIASTOLIC BLOOD PRESSURE: 68 MMHG | SYSTOLIC BLOOD PRESSURE: 103 MMHG

## 2021-04-21 DIAGNOSIS — E11.65 TYPE 2 DIABETES MELLITUS WITH HYPERGLYCEMIA, WITH LONG-TERM CURRENT USE OF INSULIN: Primary | ICD-10-CM

## 2021-04-21 DIAGNOSIS — L97.511 ULCER OF RIGHT FOOT, LIMITED TO BREAKDOWN OF SKIN: ICD-10-CM

## 2021-04-21 DIAGNOSIS — Z79.4 TYPE 2 DIABETES MELLITUS WITH HYPERGLYCEMIA, WITH LONG-TERM CURRENT USE OF INSULIN: Primary | ICD-10-CM

## 2021-04-21 DIAGNOSIS — L97.523 ULCER OF LEFT FOOT WITH NECROSIS OF MUSCLE: ICD-10-CM

## 2021-04-21 PROCEDURE — 88305 TISSUE EXAM BY PATHOLOGIST: CPT | Performed by: PATHOLOGY

## 2021-04-21 PROCEDURE — 87075 CULTR BACTERIA EXCEPT BLOOD: CPT | Performed by: PODIATRIST

## 2021-04-21 PROCEDURE — 99214 PR OFFICE/OUTPT VISIT, EST, LEVL IV, 30-39 MIN: ICD-10-PCS | Mod: 25,S$PBB,, | Performed by: PODIATRIST

## 2021-04-21 PROCEDURE — 99214 OFFICE O/P EST MOD 30 MIN: CPT | Mod: PBBFAC | Performed by: PODIATRIST

## 2021-04-21 PROCEDURE — 87077 CULTURE AEROBIC IDENTIFY: CPT | Performed by: PODIATRIST

## 2021-04-21 PROCEDURE — 99999 PR PBB SHADOW E&M-EST. PATIENT-LVL IV: CPT | Mod: PBBFAC,,, | Performed by: PODIATRIST

## 2021-04-21 PROCEDURE — 87176 TISSUE HOMOGENIZATION CULTR: CPT | Performed by: PODIATRIST

## 2021-04-21 PROCEDURE — 11044 DBRDMT BONE 1ST 20 SQ CM/<: CPT | Mod: S$PBB,,, | Performed by: PODIATRIST

## 2021-04-21 PROCEDURE — 99214 OFFICE O/P EST MOD 30 MIN: CPT | Mod: 25,S$PBB,, | Performed by: PODIATRIST

## 2021-04-21 PROCEDURE — 87186 SC STD MICRODIL/AGAR DIL: CPT | Performed by: PODIATRIST

## 2021-04-21 PROCEDURE — 88305 TISSUE EXAM BY PATHOLOGIST: ICD-10-PCS | Mod: 26,,, | Performed by: PATHOLOGY

## 2021-04-21 PROCEDURE — 87076 CULTURE ANAEROBE IDENT EACH: CPT | Performed by: PODIATRIST

## 2021-04-21 PROCEDURE — 99999 PR PBB SHADOW E&M-EST. PATIENT-LVL IV: ICD-10-PCS | Mod: PBBFAC,,, | Performed by: PODIATRIST

## 2021-04-21 PROCEDURE — 88305 TISSUE EXAM BY PATHOLOGIST: CPT | Mod: 26,,, | Performed by: PATHOLOGY

## 2021-04-21 PROCEDURE — 11044 PR DEBRIDEMENT, SKIN, SUB-Q TISSUE,MUSCLE,BONE,=<20 SQ CM: ICD-10-PCS | Mod: S$PBB,,, | Performed by: PODIATRIST

## 2021-04-21 PROCEDURE — 87070 CULTURE OTHR SPECIMN AEROBIC: CPT | Performed by: PODIATRIST

## 2021-04-21 PROCEDURE — 11044 DBRDMT BONE 1ST 20 SQ CM/<: CPT | Mod: PBBFAC | Performed by: PODIATRIST

## 2021-04-24 LAB — BACTERIA SPEC AEROBE CULT: ABNORMAL

## 2021-04-27 LAB
BACTERIA SPEC ANAEROBE CULT: ABNORMAL
BACTERIA SPEC ANAEROBE CULT: ABNORMAL

## 2021-04-28 ENCOUNTER — HOSPITAL ENCOUNTER (INPATIENT)
Facility: HOSPITAL | Age: 51
LOS: 6 days | Discharge: HOME | DRG: 464 | End: 2021-05-04
Attending: EMERGENCY MEDICINE | Admitting: HOSPITALIST
Payer: MEDICAID

## 2021-04-28 ENCOUNTER — OFFICE VISIT (OUTPATIENT)
Dept: PODIATRY | Facility: CLINIC | Age: 51
DRG: 464 | End: 2021-04-28
Payer: MEDICAID

## 2021-04-28 ENCOUNTER — OFFICE VISIT (OUTPATIENT)
Dept: INFECTIOUS DISEASES | Facility: CLINIC | Age: 51
DRG: 464 | End: 2021-04-28
Payer: MEDICAID

## 2021-04-28 ENCOUNTER — TELEPHONE (OUTPATIENT)
Dept: INFECTIOUS DISEASES | Facility: CLINIC | Age: 51
End: 2021-04-28

## 2021-04-28 ENCOUNTER — ANESTHESIA EVENT (OUTPATIENT)
Dept: SURGERY | Facility: HOSPITAL | Age: 51
DRG: 464 | End: 2021-04-28
Payer: MEDICAID

## 2021-04-28 VITALS
WEIGHT: 228 LBS | BODY MASS INDEX: 30.22 KG/M2 | TEMPERATURE: 98 F | HEART RATE: 88 BPM | DIASTOLIC BLOOD PRESSURE: 93 MMHG | SYSTOLIC BLOOD PRESSURE: 154 MMHG | RESPIRATION RATE: 18 BRPM | HEIGHT: 73 IN

## 2021-04-28 DIAGNOSIS — E11.65 TYPE 2 DIABETES MELLITUS WITH HYPERGLYCEMIA, WITH LONG-TERM CURRENT USE OF INSULIN: Primary | ICD-10-CM

## 2021-04-28 DIAGNOSIS — A42.9 ACTINOMYCES INFECTION: ICD-10-CM

## 2021-04-28 DIAGNOSIS — I10 ESSENTIAL HYPERTENSION: ICD-10-CM

## 2021-04-28 DIAGNOSIS — M86.9 OSTEOMYELITIS, UNSPECIFIED SITE, UNSPECIFIED TYPE: ICD-10-CM

## 2021-04-28 DIAGNOSIS — M86.272 SUBACUTE OSTEOMYELITIS OF LEFT FOOT: ICD-10-CM

## 2021-04-28 DIAGNOSIS — D64.9 ANEMIA, UNSPECIFIED TYPE: ICD-10-CM

## 2021-04-28 DIAGNOSIS — E08.621 DIABETIC ULCER OF RIGHT MIDFOOT ASSOCIATED WITH DIABETES MELLITUS DUE TO UNDERLYING CONDITION, LIMITED TO BREAKDOWN OF SKIN: ICD-10-CM

## 2021-04-28 DIAGNOSIS — R94.31 QT PROLONGATION: ICD-10-CM

## 2021-04-28 DIAGNOSIS — L97.524 ULCER OF LEFT FOOT WITH NECROSIS OF BONE: ICD-10-CM

## 2021-04-28 DIAGNOSIS — M86.9 OSTEOMYELITIS OF LEFT FOOT, UNSPECIFIED TYPE: ICD-10-CM

## 2021-04-28 DIAGNOSIS — A49.8 ANAEROBIC BACTERIAL INFECTION: ICD-10-CM

## 2021-04-28 DIAGNOSIS — M86.10 ACUTE ON CHRONIC OSTEOMYELITIS: Primary | ICD-10-CM

## 2021-04-28 DIAGNOSIS — A49.8 PROTEUS INFECTION: ICD-10-CM

## 2021-04-28 DIAGNOSIS — M86.60 ACUTE ON CHRONIC OSTEOMYELITIS: ICD-10-CM

## 2021-04-28 DIAGNOSIS — L97.529 DIABETIC ULCER OF LEFT FOOT ASSOCIATED WITH OTHER SPECIFIED DIABETES MELLITUS, UNSPECIFIED PART OF FOOT, UNSPECIFIED ULCER STAGE: Primary | ICD-10-CM

## 2021-04-28 DIAGNOSIS — M86.60 ACUTE ON CHRONIC OSTEOMYELITIS: Primary | ICD-10-CM

## 2021-04-28 DIAGNOSIS — L97.411 DIABETIC ULCER OF RIGHT MIDFOOT ASSOCIATED WITH DIABETES MELLITUS DUE TO UNDERLYING CONDITION, LIMITED TO BREAKDOWN OF SKIN: ICD-10-CM

## 2021-04-28 DIAGNOSIS — Z79.4 TYPE 2 DIABETES MELLITUS WITH HYPERGLYCEMIA, WITH LONG-TERM CURRENT USE OF INSULIN: Primary | ICD-10-CM

## 2021-04-28 DIAGNOSIS — E11.00 HYPEROSMOLAR HYPERGLYCEMIC STATE (HHS): ICD-10-CM

## 2021-04-28 DIAGNOSIS — L97.421 DIABETIC ULCER OF LEFT HEEL ASSOCIATED WITH DIABETES MELLITUS DUE TO UNDERLYING CONDITION, LIMITED TO BREAKDOWN OF SKIN: ICD-10-CM

## 2021-04-28 DIAGNOSIS — E11.65 TYPE 2 DIABETES MELLITUS WITH HYPERGLYCEMIA, WITH LONG-TERM CURRENT USE OF INSULIN: ICD-10-CM

## 2021-04-28 DIAGNOSIS — Z01.818 PREOP EXAMINATION: ICD-10-CM

## 2021-04-28 DIAGNOSIS — E08.621 DIABETIC ULCER OF LEFT HEEL ASSOCIATED WITH DIABETES MELLITUS DUE TO UNDERLYING CONDITION, LIMITED TO BREAKDOWN OF SKIN: ICD-10-CM

## 2021-04-28 DIAGNOSIS — Z79.4 TYPE 2 DIABETES MELLITUS WITH HYPERGLYCEMIA, WITH LONG-TERM CURRENT USE OF INSULIN: ICD-10-CM

## 2021-04-28 DIAGNOSIS — M00.872: ICD-10-CM

## 2021-04-28 DIAGNOSIS — M86.10 ACUTE ON CHRONIC OSTEOMYELITIS: ICD-10-CM

## 2021-04-28 DIAGNOSIS — E13.621 DIABETIC ULCER OF LEFT FOOT ASSOCIATED WITH OTHER SPECIFIED DIABETES MELLITUS, UNSPECIFIED PART OF FOOT, UNSPECIFIED ULCER STAGE: Primary | ICD-10-CM

## 2021-04-28 DIAGNOSIS — E78.2 MIXED HYPERLIPIDEMIA: ICD-10-CM

## 2021-04-28 DIAGNOSIS — M00.9 SEPTIC ARTHRITIS OF LEFT FOOT, DUE TO UNSPECIFIED ORGANISM: ICD-10-CM

## 2021-04-28 PROBLEM — E11.621 DIABETIC ULCER OF LEFT FOOT: Status: ACTIVE | Noted: 2021-04-28

## 2021-04-28 LAB
ALBUMIN SERPL BCP-MCNC: 2.7 G/DL (ref 3.5–5.2)
ALP SERPL-CCNC: 156 U/L (ref 55–135)
ALT SERPL W/O P-5'-P-CCNC: 13 U/L (ref 10–44)
ANION GAP SERPL CALC-SCNC: 12 MMOL/L (ref 8–16)
AST SERPL-CCNC: 17 U/L (ref 10–40)
B-OH-BUTYR BLD STRIP-SCNC: 0.4 MMOL/L (ref 0–0.5)
BASOPHILS # BLD AUTO: 0.03 K/UL (ref 0–0.2)
BASOPHILS NFR BLD: 0.5 % (ref 0–1.9)
BILIRUB SERPL-MCNC: 0.4 MG/DL (ref 0.1–1)
BUN SERPL-MCNC: 9 MG/DL (ref 6–20)
CALCIUM SERPL-MCNC: 9 MG/DL (ref 8.7–10.5)
CHLORIDE SERPL-SCNC: 100 MMOL/L (ref 95–110)
CO2 SERPL-SCNC: 23 MMOL/L (ref 23–29)
CREAT SERPL-MCNC: 1.2 MG/DL (ref 0.5–1.4)
CTP QC/QA: YES
DIFFERENTIAL METHOD: ABNORMAL
EOSINOPHIL # BLD AUTO: 0.1 K/UL (ref 0–0.5)
EOSINOPHIL NFR BLD: 1.6 % (ref 0–8)
ERYTHROCYTE [DISTWIDTH] IN BLOOD BY AUTOMATED COUNT: 12.8 % (ref 11.5–14.5)
EST. GFR  (AFRICAN AMERICAN): >60 ML/MIN/1.73 M^2
EST. GFR  (NON AFRICAN AMERICAN): >60 ML/MIN/1.73 M^2
ESTIMATED AVG GLUCOSE: ABNORMAL MG/DL (ref 68–131)
ESTIMATED AVG GLUCOSE: ABNORMAL MG/DL (ref 68–131)
GLUCOSE SERPL-MCNC: 489 MG/DL (ref 70–110)
HBA1C MFR BLD: >14 % (ref 4–5.6)
HBA1C MFR BLD: >14 % (ref 4–5.6)
HCT VFR BLD AUTO: 30.7 % (ref 40–54)
HGB BLD-MCNC: 9.7 G/DL (ref 14–18)
IMM GRANULOCYTES # BLD AUTO: 0.01 K/UL (ref 0–0.04)
IMM GRANULOCYTES NFR BLD AUTO: 0.2 % (ref 0–0.5)
LACTATE SERPL-SCNC: 2.2 MMOL/L (ref 0.5–2.2)
LYMPHOCYTES # BLD AUTO: 1.6 K/UL (ref 1–4.8)
LYMPHOCYTES NFR BLD: 29.5 % (ref 18–48)
MCH RBC QN AUTO: 28.6 PG (ref 27–31)
MCHC RBC AUTO-ENTMCNC: 31.6 G/DL (ref 32–36)
MCV RBC AUTO: 91 FL (ref 82–98)
MONOCYTES # BLD AUTO: 0.4 K/UL (ref 0.3–1)
MONOCYTES NFR BLD: 7.4 % (ref 4–15)
NEUTROPHILS # BLD AUTO: 3.4 K/UL (ref 1.8–7.7)
NEUTROPHILS NFR BLD: 60.8 % (ref 38–73)
NRBC BLD-RTO: 0 /100 WBC
PLATELET # BLD AUTO: 333 K/UL (ref 150–450)
PMV BLD AUTO: 9.9 FL (ref 9.2–12.9)
POCT GLUCOSE: 355 MG/DL (ref 70–110)
POCT GLUCOSE: 373 MG/DL (ref 70–110)
POTASSIUM SERPL-SCNC: 4 MMOL/L (ref 3.5–5.1)
PROT SERPL-MCNC: 7.4 G/DL (ref 6–8.4)
RBC # BLD AUTO: 3.39 M/UL (ref 4.6–6.2)
SARS-COV-2 RDRP RESP QL NAA+PROBE: NEGATIVE
SODIUM SERPL-SCNC: 135 MMOL/L (ref 136–145)
TROPONIN I SERPL DL<=0.01 NG/ML-MCNC: 0.01 NG/ML (ref 0–0.03)
WBC # BLD AUTO: 5.55 K/UL (ref 3.9–12.7)

## 2021-04-28 PROCEDURE — 25500020 PHARM REV CODE 255: Performed by: HOSPITALIST

## 2021-04-28 PROCEDURE — 99999 PR PBB SHADOW E&M-EST. PATIENT-LVL I: ICD-10-PCS | Mod: PBBFAC,,, | Performed by: INTERNAL MEDICINE

## 2021-04-28 PROCEDURE — 11044 PR DEBRIDEMENT, SKIN, SUB-Q TISSUE,MUSCLE,BONE,=<20 SQ CM: ICD-10-PCS | Mod: S$PBB,,, | Performed by: PODIATRIST

## 2021-04-28 PROCEDURE — 99215 PR OFFICE/OUTPT VISIT, EST, LEVL V, 40-54 MIN: ICD-10-PCS | Mod: S$PBB,,, | Performed by: INTERNAL MEDICINE

## 2021-04-28 PROCEDURE — 88311 PR  DECALCIFY TISSUE: ICD-10-PCS | Mod: 26,,, | Performed by: PATHOLOGY

## 2021-04-28 PROCEDURE — C9399 UNCLASSIFIED DRUGS OR BIOLOG: HCPCS | Performed by: HOSPITALIST

## 2021-04-28 PROCEDURE — 87040 BLOOD CULTURE FOR BACTERIA: CPT | Performed by: PHYSICIAN ASSISTANT

## 2021-04-28 PROCEDURE — G0378 HOSPITAL OBSERVATION PER HR: HCPCS

## 2021-04-28 PROCEDURE — 99285 EMERGENCY DEPT VISIT HI MDM: CPT | Mod: 25,27

## 2021-04-28 PROCEDURE — 99215 OFFICE O/P EST HI 40 MIN: CPT | Mod: S$PBB,,, | Performed by: INTERNAL MEDICINE

## 2021-04-28 PROCEDURE — 99499 NO LOS: ICD-10-PCS | Mod: S$PBB,,, | Performed by: PODIATRIST

## 2021-04-28 PROCEDURE — 85025 COMPLETE CBC W/AUTO DIFF WBC: CPT | Performed by: PHYSICIAN ASSISTANT

## 2021-04-28 PROCEDURE — U0002 COVID-19 LAB TEST NON-CDC: HCPCS | Performed by: EMERGENCY MEDICINE

## 2021-04-28 PROCEDURE — 88311 DECALCIFY TISSUE: CPT | Mod: 26,,, | Performed by: PATHOLOGY

## 2021-04-28 PROCEDURE — 88305 TISSUE EXAM BY PATHOLOGIST: CPT | Performed by: PATHOLOGY

## 2021-04-28 PROCEDURE — 99215 OFFICE O/P EST HI 40 MIN: CPT | Mod: PBBFAC,25,27 | Performed by: PODIATRIST

## 2021-04-28 PROCEDURE — 99214 OFFICE O/P EST MOD 30 MIN: CPT | Mod: 57,25,, | Performed by: PODIATRIST

## 2021-04-28 PROCEDURE — 83036 HEMOGLOBIN GLYCOSYLATED A1C: CPT | Mod: 91 | Performed by: HOSPITALIST

## 2021-04-28 PROCEDURE — 11044 DBRDMT BONE 1ST 20 SQ CM/<: CPT | Mod: PBBFAC | Performed by: PODIATRIST

## 2021-04-28 PROCEDURE — 99285 EMERGENCY DEPT VISIT HI MDM: CPT | Mod: CS,,, | Performed by: PHYSICIAN ASSISTANT

## 2021-04-28 PROCEDURE — A9585 GADOBUTROL INJECTION: HCPCS | Performed by: HOSPITALIST

## 2021-04-28 PROCEDURE — 82010 KETONE BODYS QUAN: CPT | Performed by: PHYSICIAN ASSISTANT

## 2021-04-28 PROCEDURE — 11044 DBRDMT BONE 1ST 20 SQ CM/<: CPT | Mod: S$PBB,,, | Performed by: PODIATRIST

## 2021-04-28 PROCEDURE — 25000003 PHARM REV CODE 250: Performed by: HOSPITALIST

## 2021-04-28 PROCEDURE — 99499 UNLISTED E&M SERVICE: CPT | Mod: S$PBB,,, | Performed by: PODIATRIST

## 2021-04-28 PROCEDURE — 96372 THER/PROPH/DIAG INJ SC/IM: CPT

## 2021-04-28 PROCEDURE — 83605 ASSAY OF LACTIC ACID: CPT | Performed by: PHYSICIAN ASSISTANT

## 2021-04-28 PROCEDURE — 88305 TISSUE EXAM BY PATHOLOGIST: ICD-10-PCS | Mod: 26,,, | Performed by: PATHOLOGY

## 2021-04-28 PROCEDURE — 84484 ASSAY OF TROPONIN QUANT: CPT | Performed by: PHYSICIAN ASSISTANT

## 2021-04-28 PROCEDURE — 88305 TISSUE EXAM BY PATHOLOGIST: CPT | Mod: 26,,, | Performed by: PATHOLOGY

## 2021-04-28 PROCEDURE — 99211 OFF/OP EST MAY X REQ PHY/QHP: CPT | Mod: PBBFAC,25 | Performed by: INTERNAL MEDICINE

## 2021-04-28 PROCEDURE — 99999 PR PBB SHADOW E&M-EST. PATIENT-LVL I: CPT | Mod: PBBFAC,,, | Performed by: INTERNAL MEDICINE

## 2021-04-28 PROCEDURE — 83036 HEMOGLOBIN GLYCOSYLATED A1C: CPT | Performed by: PHYSICIAN ASSISTANT

## 2021-04-28 PROCEDURE — 99999 PR PBB SHADOW E&M-EST. PATIENT-LVL V: ICD-10-PCS | Mod: PBBFAC,,, | Performed by: PODIATRIST

## 2021-04-28 PROCEDURE — 80053 COMPREHEN METABOLIC PANEL: CPT | Performed by: PHYSICIAN ASSISTANT

## 2021-04-28 PROCEDURE — 99220 PR INITIAL OBSERVATION CARE,LEVL III: CPT | Mod: ,,, | Performed by: HOSPITALIST

## 2021-04-28 PROCEDURE — 36415 COLL VENOUS BLD VENIPUNCTURE: CPT | Performed by: HOSPITALIST

## 2021-04-28 PROCEDURE — 63600175 PHARM REV CODE 636 W HCPCS: Performed by: HOSPITALIST

## 2021-04-28 PROCEDURE — 99220 PR INITIAL OBSERVATION CARE,LEVL III: ICD-10-PCS | Mod: ,,, | Performed by: HOSPITALIST

## 2021-04-28 PROCEDURE — 99285 PR EMERGENCY DEPT VISIT,LEVEL V: ICD-10-PCS | Mod: CS,,, | Performed by: PHYSICIAN ASSISTANT

## 2021-04-28 PROCEDURE — 11000001 HC ACUTE MED/SURG PRIVATE ROOM

## 2021-04-28 PROCEDURE — 99214 PR OFFICE/OUTPT VISIT, EST, LEVL IV, 30-39 MIN: ICD-10-PCS | Mod: 57,25,, | Performed by: PODIATRIST

## 2021-04-28 PROCEDURE — 99999 PR PBB SHADOW E&M-EST. PATIENT-LVL V: CPT | Mod: PBBFAC,,, | Performed by: PODIATRIST

## 2021-04-28 PROCEDURE — 88311 DECALCIFY TISSUE: CPT | Performed by: PATHOLOGY

## 2021-04-28 RX ORDER — IBUPROFEN 200 MG
24 TABLET ORAL
Status: DISCONTINUED | OUTPATIENT
Start: 2021-04-28 | End: 2021-05-04 | Stop reason: HOSPADM

## 2021-04-28 RX ORDER — INSULIN ASPART 100 [IU]/ML
0-5 INJECTION, SOLUTION INTRAVENOUS; SUBCUTANEOUS
Status: DISCONTINUED | OUTPATIENT
Start: 2021-04-28 | End: 2021-04-28

## 2021-04-28 RX ORDER — INSULIN ASPART 100 [IU]/ML
6 INJECTION, SOLUTION INTRAVENOUS; SUBCUTANEOUS
Status: DISCONTINUED | OUTPATIENT
Start: 2021-04-28 | End: 2021-04-29

## 2021-04-28 RX ORDER — IBUPROFEN 200 MG
16 TABLET ORAL
Status: DISCONTINUED | OUTPATIENT
Start: 2021-04-28 | End: 2021-04-28

## 2021-04-28 RX ORDER — GLUCAGON 1 MG
1 KIT INJECTION
Status: DISCONTINUED | OUTPATIENT
Start: 2021-04-28 | End: 2021-05-04 | Stop reason: HOSPADM

## 2021-04-28 RX ORDER — IBUPROFEN 200 MG
16 TABLET ORAL
Status: DISCONTINUED | OUTPATIENT
Start: 2021-04-28 | End: 2021-05-04 | Stop reason: HOSPADM

## 2021-04-28 RX ORDER — GADOBUTROL 604.72 MG/ML
10 INJECTION INTRAVENOUS
Status: COMPLETED | OUTPATIENT
Start: 2021-04-28 | End: 2021-04-28

## 2021-04-28 RX ORDER — ATORVASTATIN CALCIUM 40 MG/1
80 TABLET, FILM COATED ORAL DAILY
Status: DISCONTINUED | OUTPATIENT
Start: 2021-04-28 | End: 2021-05-04 | Stop reason: HOSPADM

## 2021-04-28 RX ORDER — GLUCAGON 1 MG
1 KIT INJECTION
Status: DISCONTINUED | OUTPATIENT
Start: 2021-04-28 | End: 2021-04-28

## 2021-04-28 RX ORDER — ACETAMINOPHEN 325 MG/1
650 TABLET ORAL EVERY 6 HOURS PRN
Status: DISCONTINUED | OUTPATIENT
Start: 2021-04-28 | End: 2021-05-04 | Stop reason: HOSPADM

## 2021-04-28 RX ORDER — IBUPROFEN 200 MG
24 TABLET ORAL
Status: DISCONTINUED | OUTPATIENT
Start: 2021-04-28 | End: 2021-04-28

## 2021-04-28 RX ORDER — SODIUM CHLORIDE 0.9 % (FLUSH) 0.9 %
10 SYRINGE (ML) INJECTION
Status: DISCONTINUED | OUTPATIENT
Start: 2021-04-28 | End: 2021-05-04 | Stop reason: HOSPADM

## 2021-04-28 RX ORDER — INSULIN ASPART 100 [IU]/ML
1-10 INJECTION, SOLUTION INTRAVENOUS; SUBCUTANEOUS
Status: DISCONTINUED | OUTPATIENT
Start: 2021-04-28 | End: 2021-05-01

## 2021-04-28 RX ADMIN — INSULIN ASPART 5 UNITS: 100 INJECTION, SOLUTION INTRAVENOUS; SUBCUTANEOUS at 08:04

## 2021-04-28 RX ADMIN — INSULIN ASPART 10 UNITS: 100 INJECTION, SOLUTION INTRAVENOUS; SUBCUTANEOUS at 01:04

## 2021-04-28 RX ADMIN — GADOBUTROL 10 ML: 604.72 INJECTION INTRAVENOUS at 06:04

## 2021-04-28 RX ADMIN — INSULIN DETEMIR 15 UNITS: 100 INJECTION, SOLUTION SUBCUTANEOUS at 08:04

## 2021-04-29 ENCOUNTER — ANESTHESIA (OUTPATIENT)
Dept: SURGERY | Facility: HOSPITAL | Age: 51
DRG: 464 | End: 2021-04-29
Payer: MEDICAID

## 2021-04-29 PROBLEM — M86.10 ACUTE ON CHRONIC OSTEOMYELITIS: Status: ACTIVE | Noted: 2021-04-29

## 2021-04-29 PROBLEM — A49.8 PROTEUS INFECTION: Status: ACTIVE | Noted: 2021-04-29

## 2021-04-29 PROBLEM — M86.60 ACUTE ON CHRONIC OSTEOMYELITIS: Status: ACTIVE | Noted: 2021-04-29

## 2021-04-29 LAB
ALBUMIN SERPL BCP-MCNC: 2.3 G/DL (ref 3.5–5.2)
ALP SERPL-CCNC: 140 U/L (ref 55–135)
ALT SERPL W/O P-5'-P-CCNC: 11 U/L (ref 10–44)
ANION GAP SERPL CALC-SCNC: 9 MMOL/L (ref 8–16)
AST SERPL-CCNC: 13 U/L (ref 10–40)
BASOPHILS # BLD AUTO: 0.02 K/UL (ref 0–0.2)
BASOPHILS NFR BLD: 0.4 % (ref 0–1.9)
BILIRUB SERPL-MCNC: 0.3 MG/DL (ref 0.1–1)
BUN SERPL-MCNC: 9 MG/DL (ref 6–20)
CALCIUM SERPL-MCNC: 9.2 MG/DL (ref 8.7–10.5)
CHLORIDE SERPL-SCNC: 104 MMOL/L (ref 95–110)
CO2 SERPL-SCNC: 27 MMOL/L (ref 23–29)
CREAT SERPL-MCNC: 0.9 MG/DL (ref 0.5–1.4)
DIFFERENTIAL METHOD: ABNORMAL
EOSINOPHIL # BLD AUTO: 0.2 K/UL (ref 0–0.5)
EOSINOPHIL NFR BLD: 2.8 % (ref 0–8)
ERYTHROCYTE [DISTWIDTH] IN BLOOD BY AUTOMATED COUNT: 12.8 % (ref 11.5–14.5)
EST. GFR  (AFRICAN AMERICAN): >60 ML/MIN/1.73 M^2
EST. GFR  (NON AFRICAN AMERICAN): >60 ML/MIN/1.73 M^2
GLUCOSE SERPL-MCNC: 271 MG/DL (ref 70–110)
HCT VFR BLD AUTO: 30.1 % (ref 40–54)
HGB BLD-MCNC: 9.6 G/DL (ref 14–18)
IMM GRANULOCYTES # BLD AUTO: 0.01 K/UL (ref 0–0.04)
IMM GRANULOCYTES NFR BLD AUTO: 0.2 % (ref 0–0.5)
LYMPHOCYTES # BLD AUTO: 2.1 K/UL (ref 1–4.8)
LYMPHOCYTES NFR BLD: 40.3 % (ref 18–48)
MAGNESIUM SERPL-MCNC: 1.6 MG/DL (ref 1.6–2.6)
MCH RBC QN AUTO: 28.8 PG (ref 27–31)
MCHC RBC AUTO-ENTMCNC: 31.9 G/DL (ref 32–36)
MCV RBC AUTO: 90 FL (ref 82–98)
MONOCYTES # BLD AUTO: 0.4 K/UL (ref 0.3–1)
MONOCYTES NFR BLD: 7.2 % (ref 4–15)
NEUTROPHILS # BLD AUTO: 2.6 K/UL (ref 1.8–7.7)
NEUTROPHILS NFR BLD: 49.1 % (ref 38–73)
NRBC BLD-RTO: 0 /100 WBC
PHOSPHATE SERPL-MCNC: 3 MG/DL (ref 2.7–4.5)
PLATELET # BLD AUTO: 375 K/UL (ref 150–450)
PMV BLD AUTO: 10.3 FL (ref 9.2–12.9)
POCT GLUCOSE: 165 MG/DL (ref 70–110)
POCT GLUCOSE: 167 MG/DL (ref 70–110)
POCT GLUCOSE: 176 MG/DL (ref 70–110)
POCT GLUCOSE: 210 MG/DL (ref 70–110)
POCT GLUCOSE: 211 MG/DL (ref 70–110)
POTASSIUM SERPL-SCNC: 4.1 MMOL/L (ref 3.5–5.1)
PROT SERPL-MCNC: 6.8 G/DL (ref 6–8.4)
RBC # BLD AUTO: 3.33 M/UL (ref 4.6–6.2)
SODIUM SERPL-SCNC: 140 MMOL/L (ref 136–145)
WBC # BLD AUTO: 5.28 K/UL (ref 3.9–12.7)

## 2021-04-29 PROCEDURE — 88305 TISSUE EXAM BY PATHOLOGIST: ICD-10-PCS | Mod: 26,,, | Performed by: PATHOLOGY

## 2021-04-29 PROCEDURE — 99233 SBSQ HOSP IP/OBS HIGH 50: CPT | Mod: ,,, | Performed by: HOSPITALIST

## 2021-04-29 PROCEDURE — 88311 DECALCIFY TISSUE: CPT | Mod: 26,,, | Performed by: PATHOLOGY

## 2021-04-29 PROCEDURE — 97597 PR DEBRIDEMENT OPEN WOUND 20 SQ CM<: ICD-10-PCS | Mod: 59,,, | Performed by: PODIATRIST

## 2021-04-29 PROCEDURE — 88305 TISSUE EXAM BY PATHOLOGIST: CPT | Performed by: PATHOLOGY

## 2021-04-29 PROCEDURE — D9220A PRA ANESTHESIA: Mod: CRNA,,, | Performed by: STUDENT IN AN ORGANIZED HEALTH CARE EDUCATION/TRAINING PROGRAM

## 2021-04-29 PROCEDURE — 37000009 HC ANESTHESIA EA ADD 15 MINS: Performed by: PODIATRIST

## 2021-04-29 PROCEDURE — 15275 PR SKIN SUB GRAFT FACE/NK/HF/G UP TO 100 SQCM: ICD-10-PCS | Mod: 51,,, | Performed by: PODIATRIST

## 2021-04-29 PROCEDURE — 36000707: Performed by: PODIATRIST

## 2021-04-29 PROCEDURE — 88305 TISSUE EXAM BY PATHOLOGIST: CPT | Mod: 26,,, | Performed by: PATHOLOGY

## 2021-04-29 PROCEDURE — 28288 PARTIAL REMOVAL OF FOOT BONE: CPT | Mod: T3,,, | Performed by: PODIATRIST

## 2021-04-29 PROCEDURE — 37000008 HC ANESTHESIA 1ST 15 MINUTES: Performed by: PODIATRIST

## 2021-04-29 PROCEDURE — 96372 THER/PROPH/DIAG INJ SC/IM: CPT

## 2021-04-29 PROCEDURE — 25000003 PHARM REV CODE 250: Performed by: PODIATRIST

## 2021-04-29 PROCEDURE — 36415 COLL VENOUS BLD VENIPUNCTURE: CPT | Performed by: HOSPITALIST

## 2021-04-29 PROCEDURE — D9220A PRA ANESTHESIA: ICD-10-PCS | Mod: ANES,,, | Performed by: STUDENT IN AN ORGANIZED HEALTH CARE EDUCATION/TRAINING PROGRAM

## 2021-04-29 PROCEDURE — D9220A PRA ANESTHESIA: Mod: ANES,,, | Performed by: STUDENT IN AN ORGANIZED HEALTH CARE EDUCATION/TRAINING PROGRAM

## 2021-04-29 PROCEDURE — 97597 DBRDMT OPN WND 1ST 20 CM/<: CPT | Mod: 59,,, | Performed by: PODIATRIST

## 2021-04-29 PROCEDURE — 99223 1ST HOSP IP/OBS HIGH 75: CPT | Mod: ,,, | Performed by: INTERNAL MEDICINE

## 2021-04-29 PROCEDURE — 88311 DECALCIFY TISSUE: CPT | Performed by: PATHOLOGY

## 2021-04-29 PROCEDURE — 11000001 HC ACUTE MED/SURG PRIVATE ROOM

## 2021-04-29 PROCEDURE — 82962 GLUCOSE BLOOD TEST: CPT | Performed by: PODIATRIST

## 2021-04-29 PROCEDURE — 25000003 PHARM REV CODE 250: Performed by: INTERNAL MEDICINE

## 2021-04-29 PROCEDURE — 63600175 PHARM REV CODE 636 W HCPCS: Performed by: NURSE ANESTHETIST, CERTIFIED REGISTERED

## 2021-04-29 PROCEDURE — D9220A PRA ANESTHESIA: ICD-10-PCS | Mod: CRNA,,, | Performed by: STUDENT IN AN ORGANIZED HEALTH CARE EDUCATION/TRAINING PROGRAM

## 2021-04-29 PROCEDURE — 99233 PR SUBSEQUENT HOSPITAL CARE,LEVL III: ICD-10-PCS | Mod: ,,, | Performed by: HOSPITALIST

## 2021-04-29 PROCEDURE — 27201423 OPTIME MED/SURG SUP & DEVICES STERILE SUPPLY: Performed by: PODIATRIST

## 2021-04-29 PROCEDURE — 25000003 PHARM REV CODE 250: Performed by: HOSPITALIST

## 2021-04-29 PROCEDURE — 28288 PR PART REMV BONE METATARSAL HEAD,EA: ICD-10-PCS | Mod: T3,,, | Performed by: PODIATRIST

## 2021-04-29 PROCEDURE — 71000015 HC POSTOP RECOV 1ST HR: Performed by: PODIATRIST

## 2021-04-29 PROCEDURE — 80053 COMPREHEN METABOLIC PANEL: CPT | Performed by: HOSPITALIST

## 2021-04-29 PROCEDURE — 25000003 PHARM REV CODE 250: Performed by: STUDENT IN AN ORGANIZED HEALTH CARE EDUCATION/TRAINING PROGRAM

## 2021-04-29 PROCEDURE — 88311 PR  DECALCIFY TISSUE: ICD-10-PCS | Mod: 26,,, | Performed by: PATHOLOGY

## 2021-04-29 PROCEDURE — 63600175 PHARM REV CODE 636 W HCPCS: Performed by: INTERNAL MEDICINE

## 2021-04-29 PROCEDURE — 63600175 PHARM REV CODE 636 W HCPCS: Performed by: GENERAL ACUTE CARE HOSPITAL

## 2021-04-29 PROCEDURE — 99223 PR INITIAL HOSPITAL CARE,LEVL III: ICD-10-PCS | Mod: ,,, | Performed by: INTERNAL MEDICINE

## 2021-04-29 PROCEDURE — 15275 SKIN SUB GRAFT FACE/NK/HF/G: CPT | Mod: 51,,, | Performed by: PODIATRIST

## 2021-04-29 PROCEDURE — 63600175 PHARM REV CODE 636 W HCPCS: Performed by: STUDENT IN AN ORGANIZED HEALTH CARE EDUCATION/TRAINING PROGRAM

## 2021-04-29 PROCEDURE — 71000044 HC DOSC ROUTINE RECOVERY FIRST HOUR: Performed by: PODIATRIST

## 2021-04-29 PROCEDURE — 83735 ASSAY OF MAGNESIUM: CPT | Performed by: HOSPITALIST

## 2021-04-29 PROCEDURE — 36000706: Performed by: PODIATRIST

## 2021-04-29 PROCEDURE — 85025 COMPLETE CBC W/AUTO DIFF WBC: CPT | Performed by: HOSPITALIST

## 2021-04-29 PROCEDURE — 84100 ASSAY OF PHOSPHORUS: CPT | Performed by: HOSPITALIST

## 2021-04-29 DEVICE — MEMBRANE NEOX 1K 8 X 3CM: Type: IMPLANTABLE DEVICE | Site: FOOT | Status: FUNCTIONAL

## 2021-04-29 RX ORDER — PROPOFOL 10 MG/ML
VIAL (ML) INTRAVENOUS CONTINUOUS PRN
Status: DISCONTINUED | OUTPATIENT
Start: 2021-04-29 | End: 2021-04-29

## 2021-04-29 RX ORDER — LIDOCAINE HYDROCHLORIDE 10 MG/ML
INJECTION INFILTRATION; PERINEURAL
Status: DISCONTINUED | OUTPATIENT
Start: 2021-04-29 | End: 2021-04-29 | Stop reason: HOSPADM

## 2021-04-29 RX ORDER — INSULIN ASPART 100 [IU]/ML
8 INJECTION, SOLUTION INTRAVENOUS; SUBCUTANEOUS
Status: DISCONTINUED | OUTPATIENT
Start: 2021-04-29 | End: 2021-05-04 | Stop reason: HOSPADM

## 2021-04-29 RX ORDER — BUPIVACAINE HYDROCHLORIDE 2.5 MG/ML
INJECTION, SOLUTION EPIDURAL; INFILTRATION; INTRACAUDAL
Status: DISPENSED
Start: 2021-04-29 | End: 2021-04-30

## 2021-04-29 RX ORDER — SODIUM CHLORIDE 0.9 % (FLUSH) 0.9 %
3 SYRINGE (ML) INJECTION EVERY 4 HOURS PRN
Status: DISCONTINUED | OUTPATIENT
Start: 2021-04-29 | End: 2021-04-29 | Stop reason: HOSPADM

## 2021-04-29 RX ORDER — MIDAZOLAM HYDROCHLORIDE 1 MG/ML
INJECTION INTRAMUSCULAR; INTRAVENOUS
Status: DISCONTINUED | OUTPATIENT
Start: 2021-04-29 | End: 2021-04-29

## 2021-04-29 RX ORDER — FENTANYL CITRATE 50 UG/ML
25 INJECTION, SOLUTION INTRAMUSCULAR; INTRAVENOUS EVERY 5 MIN PRN
Status: DISCONTINUED | OUTPATIENT
Start: 2021-04-29 | End: 2021-04-29 | Stop reason: HOSPADM

## 2021-04-29 RX ORDER — LIDOCAINE HYDROCHLORIDE 20 MG/ML
INJECTION, SOLUTION EPIDURAL; INFILTRATION; INTRACAUDAL; PERINEURAL
Status: DISCONTINUED | OUTPATIENT
Start: 2021-04-29 | End: 2021-04-29

## 2021-04-29 RX ORDER — PHENYLEPHRINE HYDROCHLORIDE 10 MG/ML
INJECTION INTRAVENOUS
Status: DISCONTINUED | OUTPATIENT
Start: 2021-04-29 | End: 2021-04-29

## 2021-04-29 RX ORDER — PROPOFOL 10 MG/ML
VIAL (ML) INTRAVENOUS
Status: DISCONTINUED | OUTPATIENT
Start: 2021-04-29 | End: 2021-04-29

## 2021-04-29 RX ORDER — HALOPERIDOL 5 MG/ML
0.5 INJECTION INTRAMUSCULAR EVERY 10 MIN PRN
Status: DISCONTINUED | OUTPATIENT
Start: 2021-04-29 | End: 2021-04-29 | Stop reason: HOSPADM

## 2021-04-29 RX ORDER — BUPIVACAINE HYDROCHLORIDE 2.5 MG/ML
INJECTION, SOLUTION EPIDURAL; INFILTRATION; INTRACAUDAL
Status: DISCONTINUED | OUTPATIENT
Start: 2021-04-29 | End: 2021-04-29 | Stop reason: HOSPADM

## 2021-04-29 RX ORDER — ONDANSETRON 2 MG/ML
INJECTION INTRAMUSCULAR; INTRAVENOUS
Status: DISCONTINUED | OUTPATIENT
Start: 2021-04-29 | End: 2021-04-29

## 2021-04-29 RX ORDER — LIDOCAINE HYDROCHLORIDE 10 MG/ML
INJECTION, SOLUTION EPIDURAL; INFILTRATION; INTRACAUDAL; PERINEURAL
Status: DISPENSED
Start: 2021-04-29 | End: 2021-04-30

## 2021-04-29 RX ORDER — FENTANYL CITRATE 50 UG/ML
INJECTION, SOLUTION INTRAMUSCULAR; INTRAVENOUS
Status: DISCONTINUED | OUTPATIENT
Start: 2021-04-29 | End: 2021-04-29

## 2021-04-29 RX ADMIN — ONDANSETRON 4 MG: 2 INJECTION, SOLUTION INTRAMUSCULAR; INTRAVENOUS at 03:04

## 2021-04-29 RX ADMIN — FENTANYL CITRATE 25 MCG: 50 INJECTION, SOLUTION INTRAMUSCULAR; INTRAVENOUS at 02:04

## 2021-04-29 RX ADMIN — ATORVASTATIN CALCIUM 80 MG: 40 TABLET, FILM COATED ORAL at 08:04

## 2021-04-29 RX ADMIN — SODIUM CHLORIDE: 0.9 INJECTION, SOLUTION INTRAVENOUS at 02:04

## 2021-04-29 RX ADMIN — FENTANYL CITRATE 25 MCG: 50 INJECTION, SOLUTION INTRAMUSCULAR; INTRAVENOUS at 03:04

## 2021-04-29 RX ADMIN — AMPICILLIN AND SULBACTAM 3 G: 2; 1 INJECTION, POWDER, FOR SOLUTION INTRAVENOUS at 03:04

## 2021-04-29 RX ADMIN — LIDOCAINE HYDROCHLORIDE 50 MG: 20 INJECTION, SOLUTION EPIDURAL; INFILTRATION; INTRACAUDAL at 02:04

## 2021-04-29 RX ADMIN — PROPOFOL 100 MCG/KG/MIN: 10 INJECTION, EMULSION INTRAVENOUS at 02:04

## 2021-04-29 RX ADMIN — INSULIN ASPART 2 UNITS: 100 INJECTION, SOLUTION INTRAVENOUS; SUBCUTANEOUS at 12:04

## 2021-04-29 RX ADMIN — MIDAZOLAM 2 MG: 1 INJECTION INTRAMUSCULAR; INTRAVENOUS at 02:04

## 2021-04-29 RX ADMIN — INSULIN ASPART 8 UNITS: 100 INJECTION, SOLUTION INTRAVENOUS; SUBCUTANEOUS at 07:04

## 2021-04-29 RX ADMIN — INSULIN ASPART 4 UNITS: 100 INJECTION, SOLUTION INTRAVENOUS; SUBCUTANEOUS at 08:04

## 2021-04-29 RX ADMIN — INSULIN ASPART 1 UNITS: 100 INJECTION, SOLUTION INTRAVENOUS; SUBCUTANEOUS at 10:04

## 2021-04-29 RX ADMIN — INSULIN ASPART 2 UNITS: 100 INJECTION, SOLUTION INTRAVENOUS; SUBCUTANEOUS at 07:04

## 2021-04-29 RX ADMIN — PROPOFOL 20 MG: 10 INJECTION, EMULSION INTRAVENOUS at 02:04

## 2021-04-29 RX ADMIN — PHENYLEPHRINE HYDROCHLORIDE 100 MCG: 10 INJECTION, SOLUTION INTRAMUSCULAR; INTRAVENOUS; SUBCUTANEOUS at 03:04

## 2021-04-29 RX ADMIN — AMPICILLIN AND SULBACTAM 3 G: 2; 1 INJECTION, POWDER, FOR SOLUTION INTRAVENOUS at 10:04

## 2021-04-30 LAB
ALBUMIN SERPL BCP-MCNC: 2.3 G/DL (ref 3.5–5.2)
ALP SERPL-CCNC: 139 U/L (ref 55–135)
ALT SERPL W/O P-5'-P-CCNC: 11 U/L (ref 10–44)
ANION GAP SERPL CALC-SCNC: 9 MMOL/L (ref 8–16)
AST SERPL-CCNC: 15 U/L (ref 10–40)
BASOPHILS # BLD AUTO: 0.02 K/UL (ref 0–0.2)
BASOPHILS NFR BLD: 0.3 % (ref 0–1.9)
BILIRUB SERPL-MCNC: 0.2 MG/DL (ref 0.1–1)
BUN SERPL-MCNC: 9 MG/DL (ref 6–20)
CALCIUM SERPL-MCNC: 8.5 MG/DL (ref 8.7–10.5)
CHLORIDE SERPL-SCNC: 103 MMOL/L (ref 95–110)
CO2 SERPL-SCNC: 27 MMOL/L (ref 23–29)
CREAT SERPL-MCNC: 1.1 MG/DL (ref 0.5–1.4)
DIFFERENTIAL METHOD: ABNORMAL
EOSINOPHIL # BLD AUTO: 0.1 K/UL (ref 0–0.5)
EOSINOPHIL NFR BLD: 1.5 % (ref 0–8)
ERYTHROCYTE [DISTWIDTH] IN BLOOD BY AUTOMATED COUNT: 12.8 % (ref 11.5–14.5)
EST. GFR  (AFRICAN AMERICAN): >60 ML/MIN/1.73 M^2
EST. GFR  (NON AFRICAN AMERICAN): >60 ML/MIN/1.73 M^2
FINAL PATHOLOGIC DIAGNOSIS: NORMAL
GLUCOSE SERPL-MCNC: 205 MG/DL (ref 70–110)
GROSS: NORMAL
HCT VFR BLD AUTO: 29.3 % (ref 40–54)
HGB BLD-MCNC: 9.3 G/DL (ref 14–18)
IMM GRANULOCYTES # BLD AUTO: 0.02 K/UL (ref 0–0.04)
IMM GRANULOCYTES NFR BLD AUTO: 0.3 % (ref 0–0.5)
LYMPHOCYTES # BLD AUTO: 1.9 K/UL (ref 1–4.8)
LYMPHOCYTES NFR BLD: 24.6 % (ref 18–48)
Lab: NORMAL
MAGNESIUM SERPL-MCNC: 1.5 MG/DL (ref 1.6–2.6)
MCH RBC QN AUTO: 28.6 PG (ref 27–31)
MCHC RBC AUTO-ENTMCNC: 31.7 G/DL (ref 32–36)
MCV RBC AUTO: 90 FL (ref 82–98)
MONOCYTES # BLD AUTO: 0.6 K/UL (ref 0.3–1)
MONOCYTES NFR BLD: 7.3 % (ref 4–15)
NEUTROPHILS # BLD AUTO: 5 K/UL (ref 1.8–7.7)
NEUTROPHILS NFR BLD: 66 % (ref 38–73)
NRBC BLD-RTO: 0 /100 WBC
PHOSPHATE SERPL-MCNC: 3 MG/DL (ref 2.7–4.5)
PLATELET # BLD AUTO: 374 K/UL (ref 150–450)
PMV BLD AUTO: 9.9 FL (ref 9.2–12.9)
POCT GLUCOSE: 113 MG/DL (ref 70–110)
POCT GLUCOSE: 128 MG/DL (ref 70–110)
POCT GLUCOSE: 168 MG/DL (ref 70–110)
POCT GLUCOSE: 177 MG/DL (ref 70–110)
POCT GLUCOSE: 183 MG/DL (ref 70–110)
POCT GLUCOSE: 189 MG/DL (ref 70–110)
POTASSIUM SERPL-SCNC: 3.8 MMOL/L (ref 3.5–5.1)
PROT SERPL-MCNC: 6.4 G/DL (ref 6–8.4)
RBC # BLD AUTO: 3.25 M/UL (ref 4.6–6.2)
SODIUM SERPL-SCNC: 139 MMOL/L (ref 136–145)
WBC # BLD AUTO: 7.52 K/UL (ref 3.9–12.7)

## 2021-04-30 PROCEDURE — 83735 ASSAY OF MAGNESIUM: CPT | Performed by: HOSPITALIST

## 2021-04-30 PROCEDURE — 84100 ASSAY OF PHOSPHORUS: CPT | Performed by: HOSPITALIST

## 2021-04-30 PROCEDURE — 36415 COLL VENOUS BLD VENIPUNCTURE: CPT | Performed by: HOSPITALIST

## 2021-04-30 PROCEDURE — 25000003 PHARM REV CODE 250: Performed by: HOSPITALIST

## 2021-04-30 PROCEDURE — 99233 SBSQ HOSP IP/OBS HIGH 50: CPT | Mod: ,,, | Performed by: HOSPITALIST

## 2021-04-30 PROCEDURE — 63600175 PHARM REV CODE 636 W HCPCS: Performed by: INTERNAL MEDICINE

## 2021-04-30 PROCEDURE — 85025 COMPLETE CBC W/AUTO DIFF WBC: CPT | Performed by: HOSPITALIST

## 2021-04-30 PROCEDURE — 80053 COMPREHEN METABOLIC PANEL: CPT | Performed by: HOSPITALIST

## 2021-04-30 PROCEDURE — 25000003 PHARM REV CODE 250: Performed by: INTERNAL MEDICINE

## 2021-04-30 PROCEDURE — 11000001 HC ACUTE MED/SURG PRIVATE ROOM

## 2021-04-30 PROCEDURE — 99232 SBSQ HOSP IP/OBS MODERATE 35: CPT | Mod: ,,, | Performed by: INTERNAL MEDICINE

## 2021-04-30 PROCEDURE — 99233 PR SUBSEQUENT HOSPITAL CARE,LEVL III: ICD-10-PCS | Mod: ,,, | Performed by: HOSPITALIST

## 2021-04-30 PROCEDURE — 99232 PR SUBSEQUENT HOSPITAL CARE,LEVL II: ICD-10-PCS | Mod: ,,, | Performed by: INTERNAL MEDICINE

## 2021-04-30 RX ADMIN — ACETAMINOPHEN 650 MG: 325 TABLET ORAL at 09:04

## 2021-04-30 RX ADMIN — AMPICILLIN AND SULBACTAM 3 G: 2; 1 INJECTION, POWDER, FOR SOLUTION INTRAVENOUS at 04:04

## 2021-04-30 RX ADMIN — ATORVASTATIN CALCIUM 80 MG: 40 TABLET, FILM COATED ORAL at 09:04

## 2021-04-30 RX ADMIN — INSULIN ASPART 8 UNITS: 100 INJECTION, SOLUTION INTRAVENOUS; SUBCUTANEOUS at 12:04

## 2021-04-30 RX ADMIN — INSULIN ASPART 2 UNITS: 100 INJECTION, SOLUTION INTRAVENOUS; SUBCUTANEOUS at 12:04

## 2021-04-30 RX ADMIN — INSULIN ASPART 8 UNITS: 100 INJECTION, SOLUTION INTRAVENOUS; SUBCUTANEOUS at 09:04

## 2021-04-30 RX ADMIN — INSULIN ASPART 2 UNITS: 100 INJECTION, SOLUTION INTRAVENOUS; SUBCUTANEOUS at 09:04

## 2021-04-30 RX ADMIN — INSULIN ASPART 8 UNITS: 100 INJECTION, SOLUTION INTRAVENOUS; SUBCUTANEOUS at 04:04

## 2021-04-30 RX ADMIN — AMPICILLIN AND SULBACTAM 3 G: 2; 1 INJECTION, POWDER, FOR SOLUTION INTRAVENOUS at 11:04

## 2021-04-30 RX ADMIN — AMPICILLIN AND SULBACTAM 3 G: 2; 1 INJECTION, POWDER, FOR SOLUTION INTRAVENOUS at 06:04

## 2021-05-01 LAB
ALBUMIN SERPL BCP-MCNC: 2.3 G/DL (ref 3.5–5.2)
ALP SERPL-CCNC: 132 U/L (ref 55–135)
ALT SERPL W/O P-5'-P-CCNC: 11 U/L (ref 10–44)
ANION GAP SERPL CALC-SCNC: 11 MMOL/L (ref 8–16)
AST SERPL-CCNC: 14 U/L (ref 10–40)
BASOPHILS # BLD AUTO: 0.03 K/UL (ref 0–0.2)
BASOPHILS NFR BLD: 0.5 % (ref 0–1.9)
BILIRUB SERPL-MCNC: 0.3 MG/DL (ref 0.1–1)
BUN SERPL-MCNC: 8 MG/DL (ref 6–20)
CALCIUM SERPL-MCNC: 8.4 MG/DL (ref 8.7–10.5)
CHLORIDE SERPL-SCNC: 105 MMOL/L (ref 95–110)
CO2 SERPL-SCNC: 24 MMOL/L (ref 23–29)
CREAT SERPL-MCNC: 1 MG/DL (ref 0.5–1.4)
DIFFERENTIAL METHOD: ABNORMAL
EOSINOPHIL # BLD AUTO: 0.1 K/UL (ref 0–0.5)
EOSINOPHIL NFR BLD: 1.9 % (ref 0–8)
ERYTHROCYTE [DISTWIDTH] IN BLOOD BY AUTOMATED COUNT: 13.2 % (ref 11.5–14.5)
EST. GFR  (AFRICAN AMERICAN): >60 ML/MIN/1.73 M^2
EST. GFR  (NON AFRICAN AMERICAN): >60 ML/MIN/1.73 M^2
GLUCOSE SERPL-MCNC: 105 MG/DL (ref 70–110)
HCT VFR BLD AUTO: 28.4 % (ref 40–54)
HGB BLD-MCNC: 8.8 G/DL (ref 14–18)
IMM GRANULOCYTES # BLD AUTO: 0.02 K/UL (ref 0–0.04)
IMM GRANULOCYTES NFR BLD AUTO: 0.3 % (ref 0–0.5)
LYMPHOCYTES # BLD AUTO: 2.4 K/UL (ref 1–4.8)
LYMPHOCYTES NFR BLD: 36.9 % (ref 18–48)
MAGNESIUM SERPL-MCNC: 1.5 MG/DL (ref 1.6–2.6)
MCH RBC QN AUTO: 28.2 PG (ref 27–31)
MCHC RBC AUTO-ENTMCNC: 31 G/DL (ref 32–36)
MCV RBC AUTO: 91 FL (ref 82–98)
MONOCYTES # BLD AUTO: 0.5 K/UL (ref 0.3–1)
MONOCYTES NFR BLD: 7.3 % (ref 4–15)
NEUTROPHILS # BLD AUTO: 3.4 K/UL (ref 1.8–7.7)
NEUTROPHILS NFR BLD: 53.1 % (ref 38–73)
NRBC BLD-RTO: 0 /100 WBC
PHOSPHATE SERPL-MCNC: 3 MG/DL (ref 2.7–4.5)
PLATELET # BLD AUTO: 383 K/UL (ref 150–450)
PMV BLD AUTO: 10.1 FL (ref 9.2–12.9)
POCT GLUCOSE: 122 MG/DL (ref 70–110)
POCT GLUCOSE: 154 MG/DL (ref 70–110)
POCT GLUCOSE: 191 MG/DL (ref 70–110)
POCT GLUCOSE: 96 MG/DL (ref 70–110)
POTASSIUM SERPL-SCNC: 3.5 MMOL/L (ref 3.5–5.1)
PROT SERPL-MCNC: 6.3 G/DL (ref 6–8.4)
RBC # BLD AUTO: 3.12 M/UL (ref 4.6–6.2)
SODIUM SERPL-SCNC: 140 MMOL/L (ref 136–145)
WBC # BLD AUTO: 6.45 K/UL (ref 3.9–12.7)

## 2021-05-01 PROCEDURE — 36573 INSJ PICC RS&I 5 YR+: CPT

## 2021-05-01 PROCEDURE — 99233 PR SUBSEQUENT HOSPITAL CARE,LEVL III: ICD-10-PCS | Mod: ,,, | Performed by: HOSPITALIST

## 2021-05-01 PROCEDURE — A4216 STERILE WATER/SALINE, 10 ML: HCPCS | Performed by: HOSPITALIST

## 2021-05-01 PROCEDURE — 80053 COMPREHEN METABOLIC PANEL: CPT | Performed by: HOSPITALIST

## 2021-05-01 PROCEDURE — 97161 PT EVAL LOW COMPLEX 20 MIN: CPT

## 2021-05-01 PROCEDURE — 25000003 PHARM REV CODE 250: Performed by: HOSPITALIST

## 2021-05-01 PROCEDURE — 85025 COMPLETE CBC W/AUTO DIFF WBC: CPT | Performed by: HOSPITALIST

## 2021-05-01 PROCEDURE — 83735 ASSAY OF MAGNESIUM: CPT | Performed by: HOSPITALIST

## 2021-05-01 PROCEDURE — C1751 CATH, INF, PER/CENT/MIDLINE: HCPCS

## 2021-05-01 PROCEDURE — 11000001 HC ACUTE MED/SURG PRIVATE ROOM

## 2021-05-01 PROCEDURE — 76937 US GUIDE VASCULAR ACCESS: CPT

## 2021-05-01 PROCEDURE — 36415 COLL VENOUS BLD VENIPUNCTURE: CPT | Performed by: HOSPITALIST

## 2021-05-01 PROCEDURE — 25000003 PHARM REV CODE 250: Performed by: INTERNAL MEDICINE

## 2021-05-01 PROCEDURE — 97116 GAIT TRAINING THERAPY: CPT

## 2021-05-01 PROCEDURE — 84100 ASSAY OF PHOSPHORUS: CPT | Performed by: HOSPITALIST

## 2021-05-01 PROCEDURE — 63600175 PHARM REV CODE 636 W HCPCS: Performed by: INTERNAL MEDICINE

## 2021-05-01 PROCEDURE — 99233 SBSQ HOSP IP/OBS HIGH 50: CPT | Mod: ,,, | Performed by: HOSPITALIST

## 2021-05-01 RX ORDER — INSULIN ASPART 100 [IU]/ML
0-5 INJECTION, SOLUTION INTRAVENOUS; SUBCUTANEOUS
Status: DISCONTINUED | OUTPATIENT
Start: 2021-05-01 | End: 2021-05-04 | Stop reason: HOSPADM

## 2021-05-01 RX ORDER — INSULIN ASPART 100 [IU]/ML
1-10 INJECTION, SOLUTION INTRAVENOUS; SUBCUTANEOUS
Qty: 3 ML | Refills: 0 | Status: SHIPPED | OUTPATIENT
Start: 2021-05-01 | End: 2021-08-26

## 2021-05-01 RX ORDER — INSULIN ASPART 100 [IU]/ML
8 INJECTION, SOLUTION INTRAVENOUS; SUBCUTANEOUS 3 TIMES DAILY
Qty: 3 ML | Refills: 0 | Status: SHIPPED | OUTPATIENT
Start: 2021-05-01 | End: 2021-08-26

## 2021-05-01 RX ORDER — SODIUM CHLORIDE 0.9 % (FLUSH) 0.9 %
10 SYRINGE (ML) INJECTION
Status: DISCONTINUED | OUTPATIENT
Start: 2021-05-01 | End: 2021-05-04 | Stop reason: HOSPADM

## 2021-05-01 RX ORDER — ACETAMINOPHEN 325 MG/1
650 TABLET ORAL EVERY 6 HOURS PRN
Qty: 30 TABLET | Refills: 0 | Status: SHIPPED | OUTPATIENT
Start: 2021-05-01 | End: 2023-07-27

## 2021-05-01 RX ORDER — SODIUM CHLORIDE 0.9 % (FLUSH) 0.9 %
10 SYRINGE (ML) INJECTION EVERY 6 HOURS
Status: DISCONTINUED | OUTPATIENT
Start: 2021-05-01 | End: 2021-05-04 | Stop reason: HOSPADM

## 2021-05-01 RX ADMIN — ATORVASTATIN CALCIUM 80 MG: 40 TABLET, FILM COATED ORAL at 09:05

## 2021-05-01 RX ADMIN — INSULIN ASPART 8 UNITS: 100 INJECTION, SOLUTION INTRAVENOUS; SUBCUTANEOUS at 05:05

## 2021-05-01 RX ADMIN — AMPICILLIN AND SULBACTAM 3 G: 2; 1 INJECTION, POWDER, FOR SOLUTION INTRAVENOUS at 10:05

## 2021-05-01 RX ADMIN — Medication 10 ML: at 05:05

## 2021-05-01 RX ADMIN — AMPICILLIN AND SULBACTAM 3 G: 2; 1 INJECTION, POWDER, FOR SOLUTION INTRAVENOUS at 05:05

## 2021-05-01 RX ADMIN — INSULIN ASPART 8 UNITS: 100 INJECTION, SOLUTION INTRAVENOUS; SUBCUTANEOUS at 12:05

## 2021-05-01 RX ADMIN — AMPICILLIN AND SULBACTAM 3 G: 2; 1 INJECTION, POWDER, FOR SOLUTION INTRAVENOUS at 03:05

## 2021-05-01 RX ADMIN — INSULIN ASPART 8 UNITS: 100 INJECTION, SOLUTION INTRAVENOUS; SUBCUTANEOUS at 09:05

## 2021-05-02 LAB
ALBUMIN SERPL BCP-MCNC: 2.2 G/DL (ref 3.5–5.2)
ALP SERPL-CCNC: 133 U/L (ref 55–135)
ALT SERPL W/O P-5'-P-CCNC: 10 U/L (ref 10–44)
ANION GAP SERPL CALC-SCNC: 9 MMOL/L (ref 8–16)
AST SERPL-CCNC: 15 U/L (ref 10–40)
BASOPHILS # BLD AUTO: 0.02 K/UL (ref 0–0.2)
BASOPHILS NFR BLD: 0.4 % (ref 0–1.9)
BILIRUB SERPL-MCNC: 0.3 MG/DL (ref 0.1–1)
BUN SERPL-MCNC: 9 MG/DL (ref 6–20)
CALCIUM SERPL-MCNC: 9 MG/DL (ref 8.7–10.5)
CHLORIDE SERPL-SCNC: 107 MMOL/L (ref 95–110)
CO2 SERPL-SCNC: 27 MMOL/L (ref 23–29)
CREAT SERPL-MCNC: 0.9 MG/DL (ref 0.5–1.4)
DIFFERENTIAL METHOD: ABNORMAL
EOSINOPHIL # BLD AUTO: 0.1 K/UL (ref 0–0.5)
EOSINOPHIL NFR BLD: 2.7 % (ref 0–8)
ERYTHROCYTE [DISTWIDTH] IN BLOOD BY AUTOMATED COUNT: 13.2 % (ref 11.5–14.5)
EST. GFR  (AFRICAN AMERICAN): >60 ML/MIN/1.73 M^2
EST. GFR  (NON AFRICAN AMERICAN): >60 ML/MIN/1.73 M^2
GLUCOSE SERPL-MCNC: 159 MG/DL (ref 70–110)
HCT VFR BLD AUTO: 30.3 % (ref 40–54)
HGB BLD-MCNC: 9.4 G/DL (ref 14–18)
IMM GRANULOCYTES # BLD AUTO: 0.01 K/UL (ref 0–0.04)
IMM GRANULOCYTES NFR BLD AUTO: 0.2 % (ref 0–0.5)
LYMPHOCYTES # BLD AUTO: 1.9 K/UL (ref 1–4.8)
LYMPHOCYTES NFR BLD: 38 % (ref 18–48)
MAGNESIUM SERPL-MCNC: 1.6 MG/DL (ref 1.6–2.6)
MCH RBC QN AUTO: 28.6 PG (ref 27–31)
MCHC RBC AUTO-ENTMCNC: 31 G/DL (ref 32–36)
MCV RBC AUTO: 92 FL (ref 82–98)
MONOCYTES # BLD AUTO: 0.4 K/UL (ref 0.3–1)
MONOCYTES NFR BLD: 6.9 % (ref 4–15)
NEUTROPHILS # BLD AUTO: 2.6 K/UL (ref 1.8–7.7)
NEUTROPHILS NFR BLD: 51.8 % (ref 38–73)
NRBC BLD-RTO: 0 /100 WBC
PHOSPHATE SERPL-MCNC: 2.9 MG/DL (ref 2.7–4.5)
PLATELET # BLD AUTO: 377 K/UL (ref 150–450)
PMV BLD AUTO: 9.7 FL (ref 9.2–12.9)
POCT GLUCOSE: 145 MG/DL (ref 70–110)
POCT GLUCOSE: 171 MG/DL (ref 70–110)
POCT GLUCOSE: 185 MG/DL (ref 70–110)
POTASSIUM SERPL-SCNC: 3.7 MMOL/L (ref 3.5–5.1)
PROT SERPL-MCNC: 6.4 G/DL (ref 6–8.4)
RBC # BLD AUTO: 3.29 M/UL (ref 4.6–6.2)
SODIUM SERPL-SCNC: 143 MMOL/L (ref 136–145)
WBC # BLD AUTO: 5.1 K/UL (ref 3.9–12.7)

## 2021-05-02 PROCEDURE — 25000003 PHARM REV CODE 250: Performed by: INTERNAL MEDICINE

## 2021-05-02 PROCEDURE — 63600175 PHARM REV CODE 636 W HCPCS: Performed by: INTERNAL MEDICINE

## 2021-05-02 PROCEDURE — 83735 ASSAY OF MAGNESIUM: CPT | Performed by: HOSPITALIST

## 2021-05-02 PROCEDURE — A4216 STERILE WATER/SALINE, 10 ML: HCPCS | Performed by: HOSPITALIST

## 2021-05-02 PROCEDURE — 36415 COLL VENOUS BLD VENIPUNCTURE: CPT | Performed by: HOSPITALIST

## 2021-05-02 PROCEDURE — 84100 ASSAY OF PHOSPHORUS: CPT | Performed by: HOSPITALIST

## 2021-05-02 PROCEDURE — 25000003 PHARM REV CODE 250: Performed by: HOSPITALIST

## 2021-05-02 PROCEDURE — 99232 PR SUBSEQUENT HOSPITAL CARE,LEVL II: ICD-10-PCS | Mod: ,,, | Performed by: HOSPITALIST

## 2021-05-02 PROCEDURE — 11000001 HC ACUTE MED/SURG PRIVATE ROOM

## 2021-05-02 PROCEDURE — 80053 COMPREHEN METABOLIC PANEL: CPT | Performed by: HOSPITALIST

## 2021-05-02 PROCEDURE — 63600175 PHARM REV CODE 636 W HCPCS: Performed by: GENERAL ACUTE CARE HOSPITAL

## 2021-05-02 PROCEDURE — 85025 COMPLETE CBC W/AUTO DIFF WBC: CPT | Performed by: HOSPITALIST

## 2021-05-02 PROCEDURE — 99232 SBSQ HOSP IP/OBS MODERATE 35: CPT | Mod: ,,, | Performed by: HOSPITALIST

## 2021-05-02 RX ADMIN — INSULIN ASPART 8 UNITS: 100 INJECTION, SOLUTION INTRAVENOUS; SUBCUTANEOUS at 04:05

## 2021-05-02 RX ADMIN — AMPICILLIN AND SULBACTAM 3 G: 2; 1 INJECTION, POWDER, FOR SOLUTION INTRAVENOUS at 04:05

## 2021-05-02 RX ADMIN — ATORVASTATIN CALCIUM 80 MG: 40 TABLET, FILM COATED ORAL at 09:05

## 2021-05-02 RX ADMIN — Medication 10 ML: at 06:05

## 2021-05-02 RX ADMIN — INSULIN ASPART 1 UNITS: 100 INJECTION, SOLUTION INTRAVENOUS; SUBCUTANEOUS at 10:05

## 2021-05-02 RX ADMIN — INSULIN ASPART 8 UNITS: 100 INJECTION, SOLUTION INTRAVENOUS; SUBCUTANEOUS at 12:05

## 2021-05-02 RX ADMIN — INSULIN ASPART 8 UNITS: 100 INJECTION, SOLUTION INTRAVENOUS; SUBCUTANEOUS at 09:05

## 2021-05-02 RX ADMIN — Medication 10 ML: at 12:05

## 2021-05-02 RX ADMIN — AMPICILLIN AND SULBACTAM 3 G: 2; 1 INJECTION, POWDER, FOR SOLUTION INTRAVENOUS at 10:05

## 2021-05-02 RX ADMIN — AMPICILLIN AND SULBACTAM 3 G: 2; 1 INJECTION, POWDER, FOR SOLUTION INTRAVENOUS at 06:05

## 2021-05-02 RX ADMIN — Medication 10 ML: at 10:05

## 2021-05-03 ENCOUNTER — PATIENT MESSAGE (OUTPATIENT)
Dept: PODIATRY | Facility: CLINIC | Age: 51
End: 2021-05-03

## 2021-05-03 PROBLEM — L97.411 DIABETIC ULCER OF RIGHT MIDFOOT ASSOCIATED WITH DIABETES MELLITUS DUE TO UNDERLYING CONDITION, LIMITED TO BREAKDOWN OF SKIN: Status: ACTIVE | Noted: 2019-07-12

## 2021-05-03 PROBLEM — E08.621 DIABETIC ULCER OF RIGHT MIDFOOT ASSOCIATED WITH DIABETES MELLITUS DUE TO UNDERLYING CONDITION, LIMITED TO BREAKDOWN OF SKIN: Status: ACTIVE | Noted: 2019-07-12

## 2021-05-03 LAB
ALBUMIN SERPL BCP-MCNC: 2.2 G/DL (ref 3.5–5.2)
ALBUMIN SERPL BCP-MCNC: 2.2 G/DL (ref 3.5–5.2)
ALP SERPL-CCNC: 136 U/L (ref 55–135)
ALT SERPL W/O P-5'-P-CCNC: 13 U/L (ref 10–44)
ANION GAP SERPL CALC-SCNC: 7 MMOL/L (ref 8–16)
ANION GAP SERPL CALC-SCNC: 7 MMOL/L (ref 8–16)
AST SERPL-CCNC: 20 U/L (ref 10–40)
BACTERIA BLD CULT: NORMAL
BACTERIA BLD CULT: NORMAL
BASOPHILS # BLD AUTO: 0.02 K/UL (ref 0–0.2)
BASOPHILS NFR BLD: 0.4 % (ref 0–1.9)
BILIRUB SERPL-MCNC: 0.3 MG/DL (ref 0.1–1)
BUN SERPL-MCNC: 10 MG/DL (ref 6–20)
BUN SERPL-MCNC: 10 MG/DL (ref 6–20)
C PEPTIDE SERPL-MCNC: 1.18 NG/ML (ref 0.78–5.19)
CALCIUM SERPL-MCNC: 8.5 MG/DL (ref 8.7–10.5)
CALCIUM SERPL-MCNC: 8.5 MG/DL (ref 8.7–10.5)
CHLORIDE SERPL-SCNC: 102 MMOL/L (ref 95–110)
CHLORIDE SERPL-SCNC: 102 MMOL/L (ref 95–110)
CO2 SERPL-SCNC: 27 MMOL/L (ref 23–29)
CO2 SERPL-SCNC: 27 MMOL/L (ref 23–29)
CREAT SERPL-MCNC: 1 MG/DL (ref 0.5–1.4)
CREAT SERPL-MCNC: 1 MG/DL (ref 0.5–1.4)
DIFFERENTIAL METHOD: ABNORMAL
EOSINOPHIL # BLD AUTO: 0.1 K/UL (ref 0–0.5)
EOSINOPHIL NFR BLD: 2.5 % (ref 0–8)
ERYTHROCYTE [DISTWIDTH] IN BLOOD BY AUTOMATED COUNT: 13.1 % (ref 11.5–14.5)
EST. GFR  (AFRICAN AMERICAN): >60 ML/MIN/1.73 M^2
EST. GFR  (AFRICAN AMERICAN): >60 ML/MIN/1.73 M^2
EST. GFR  (NON AFRICAN AMERICAN): >60 ML/MIN/1.73 M^2
EST. GFR  (NON AFRICAN AMERICAN): >60 ML/MIN/1.73 M^2
GLUCOSE SERPL-MCNC: 251 MG/DL (ref 70–110)
GLUCOSE SERPL-MCNC: 251 MG/DL (ref 70–110)
HCT VFR BLD AUTO: 29.3 % (ref 40–54)
HGB BLD-MCNC: 9 G/DL (ref 14–18)
IMM GRANULOCYTES # BLD AUTO: 0.01 K/UL (ref 0–0.04)
IMM GRANULOCYTES NFR BLD AUTO: 0.2 % (ref 0–0.5)
LYMPHOCYTES # BLD AUTO: 1.9 K/UL (ref 1–4.8)
LYMPHOCYTES NFR BLD: 38.9 % (ref 18–48)
MAGNESIUM SERPL-MCNC: 1.6 MG/DL (ref 1.6–2.6)
MCH RBC QN AUTO: 29 PG (ref 27–31)
MCHC RBC AUTO-ENTMCNC: 30.7 G/DL (ref 32–36)
MCV RBC AUTO: 95 FL (ref 82–98)
MONOCYTES # BLD AUTO: 0.3 K/UL (ref 0.3–1)
MONOCYTES NFR BLD: 6.3 % (ref 4–15)
NEUTROPHILS # BLD AUTO: 2.5 K/UL (ref 1.8–7.7)
NEUTROPHILS NFR BLD: 51.7 % (ref 38–73)
NRBC BLD-RTO: 0 /100 WBC
PHOSPHATE SERPL-MCNC: 2.6 MG/DL (ref 2.7–4.5)
PHOSPHATE SERPL-MCNC: 2.6 MG/DL (ref 2.7–4.5)
PLATELET # BLD AUTO: 370 K/UL (ref 150–450)
PMV BLD AUTO: 9.8 FL (ref 9.2–12.9)
POCT GLUCOSE: 140 MG/DL (ref 70–110)
POCT GLUCOSE: 146 MG/DL (ref 70–110)
POCT GLUCOSE: 158 MG/DL (ref 70–110)
POCT GLUCOSE: 214 MG/DL (ref 70–110)
POCT GLUCOSE: 223 MG/DL (ref 70–110)
POTASSIUM SERPL-SCNC: 3.6 MMOL/L (ref 3.5–5.1)
POTASSIUM SERPL-SCNC: 3.6 MMOL/L (ref 3.5–5.1)
PROT SERPL-MCNC: 6.2 G/DL (ref 6–8.4)
RBC # BLD AUTO: 3.1 M/UL (ref 4.6–6.2)
SODIUM SERPL-SCNC: 136 MMOL/L (ref 136–145)
SODIUM SERPL-SCNC: 136 MMOL/L (ref 136–145)
WBC # BLD AUTO: 4.89 K/UL (ref 3.9–12.7)

## 2021-05-03 PROCEDURE — 25000003 PHARM REV CODE 250: Performed by: INTERNAL MEDICINE

## 2021-05-03 PROCEDURE — 83735 ASSAY OF MAGNESIUM: CPT | Performed by: HOSPITALIST

## 2021-05-03 PROCEDURE — 25000003 PHARM REV CODE 250: Performed by: HOSPITALIST

## 2021-05-03 PROCEDURE — A4216 STERILE WATER/SALINE, 10 ML: HCPCS | Performed by: HOSPITALIST

## 2021-05-03 PROCEDURE — 11000001 HC ACUTE MED/SURG PRIVATE ROOM

## 2021-05-03 PROCEDURE — 99233 PR SUBSEQUENT HOSPITAL CARE,LEVL III: ICD-10-PCS | Mod: ,,, | Performed by: HOSPITALIST

## 2021-05-03 PROCEDURE — 84681 ASSAY OF C-PEPTIDE: CPT | Performed by: GENERAL ACUTE CARE HOSPITAL

## 2021-05-03 PROCEDURE — 80053 COMPREHEN METABOLIC PANEL: CPT | Performed by: HOSPITALIST

## 2021-05-03 PROCEDURE — 99233 SBSQ HOSP IP/OBS HIGH 50: CPT | Mod: ,,, | Performed by: HOSPITALIST

## 2021-05-03 PROCEDURE — 63600175 PHARM REV CODE 636 W HCPCS: Performed by: INTERNAL MEDICINE

## 2021-05-03 PROCEDURE — 84100 ASSAY OF PHOSPHORUS: CPT | Performed by: HOSPITALIST

## 2021-05-03 PROCEDURE — 85025 COMPLETE CBC W/AUTO DIFF WBC: CPT | Performed by: HOSPITALIST

## 2021-05-03 PROCEDURE — 86341 ISLET CELL ANTIBODY: CPT | Performed by: GENERAL ACUTE CARE HOSPITAL

## 2021-05-03 RX ORDER — LISINOPRIL 5 MG/1
5 TABLET ORAL DAILY
Status: DISCONTINUED | OUTPATIENT
Start: 2021-05-03 | End: 2021-05-04 | Stop reason: HOSPADM

## 2021-05-03 RX ADMIN — INSULIN ASPART 8 UNITS: 100 INJECTION, SOLUTION INTRAVENOUS; SUBCUTANEOUS at 12:05

## 2021-05-03 RX ADMIN — Medication 10 ML: at 06:05

## 2021-05-03 RX ADMIN — INSULIN ASPART 8 UNITS: 100 INJECTION, SOLUTION INTRAVENOUS; SUBCUTANEOUS at 08:05

## 2021-05-03 RX ADMIN — AMPICILLIN AND SULBACTAM 3 G: 2; 1 INJECTION, POWDER, FOR SOLUTION INTRAVENOUS at 10:05

## 2021-05-03 RX ADMIN — AMPICILLIN AND SULBACTAM 3 G: 2; 1 INJECTION, POWDER, FOR SOLUTION INTRAVENOUS at 03:05

## 2021-05-03 RX ADMIN — AMPICILLIN AND SULBACTAM 3 G: 2; 1 INJECTION, POWDER, FOR SOLUTION INTRAVENOUS at 06:05

## 2021-05-03 RX ADMIN — ATORVASTATIN CALCIUM 80 MG: 40 TABLET, FILM COATED ORAL at 08:05

## 2021-05-03 RX ADMIN — Medication 10 ML: at 10:05

## 2021-05-03 RX ADMIN — INSULIN ASPART 2 UNITS: 100 INJECTION, SOLUTION INTRAVENOUS; SUBCUTANEOUS at 08:05

## 2021-05-03 RX ADMIN — INSULIN ASPART 8 UNITS: 100 INJECTION, SOLUTION INTRAVENOUS; SUBCUTANEOUS at 04:05

## 2021-05-03 RX ADMIN — Medication 10 ML: at 12:05

## 2021-05-03 RX ADMIN — LISINOPRIL 5 MG: 5 TABLET ORAL at 12:05

## 2021-05-04 ENCOUNTER — PATIENT MESSAGE (OUTPATIENT)
Dept: PODIATRY | Facility: CLINIC | Age: 51
End: 2021-05-04

## 2021-05-04 VITALS
DIASTOLIC BLOOD PRESSURE: 100 MMHG | RESPIRATION RATE: 20 BRPM | WEIGHT: 236.75 LBS | HEIGHT: 73 IN | SYSTOLIC BLOOD PRESSURE: 178 MMHG | BODY MASS INDEX: 31.38 KG/M2 | HEART RATE: 79 BPM | OXYGEN SATURATION: 100 % | TEMPERATURE: 97 F

## 2021-05-04 LAB
POCT GLUCOSE: 123 MG/DL (ref 70–110)
POCT GLUCOSE: 131 MG/DL (ref 70–110)
POCT GLUCOSE: 172 MG/DL (ref 70–110)

## 2021-05-04 PROCEDURE — 99233 SBSQ HOSP IP/OBS HIGH 50: CPT | Mod: ,,, | Performed by: HOSPITALIST

## 2021-05-04 PROCEDURE — 97116 GAIT TRAINING THERAPY: CPT

## 2021-05-04 PROCEDURE — 99233 PR SUBSEQUENT HOSPITAL CARE,LEVL III: ICD-10-PCS | Mod: 24,,, | Performed by: PODIATRIST

## 2021-05-04 PROCEDURE — 63600175 PHARM REV CODE 636 W HCPCS: Performed by: INTERNAL MEDICINE

## 2021-05-04 PROCEDURE — 25000003 PHARM REV CODE 250: Performed by: INTERNAL MEDICINE

## 2021-05-04 PROCEDURE — 99233 PR SUBSEQUENT HOSPITAL CARE,LEVL III: ICD-10-PCS | Mod: ,,, | Performed by: HOSPITALIST

## 2021-05-04 PROCEDURE — 25000003 PHARM REV CODE 250: Performed by: HOSPITALIST

## 2021-05-04 PROCEDURE — 99233 SBSQ HOSP IP/OBS HIGH 50: CPT | Mod: 24,,, | Performed by: PODIATRIST

## 2021-05-04 PROCEDURE — A4216 STERILE WATER/SALINE, 10 ML: HCPCS | Performed by: HOSPITALIST

## 2021-05-04 RX ORDER — LISINOPRIL 5 MG/1
5 TABLET ORAL DAILY
Qty: 90 TABLET | Refills: 3 | Status: SHIPPED | OUTPATIENT
Start: 2021-05-05 | End: 2021-09-23 | Stop reason: SDUPTHER

## 2021-05-04 RX ADMIN — INSULIN ASPART 8 UNITS: 100 INJECTION, SOLUTION INTRAVENOUS; SUBCUTANEOUS at 04:05

## 2021-05-04 RX ADMIN — AMPICILLIN AND SULBACTAM 3 G: 2; 1 INJECTION, POWDER, FOR SOLUTION INTRAVENOUS at 06:05

## 2021-05-04 RX ADMIN — LISINOPRIL 5 MG: 5 TABLET ORAL at 08:05

## 2021-05-04 RX ADMIN — INSULIN ASPART 8 UNITS: 100 INJECTION, SOLUTION INTRAVENOUS; SUBCUTANEOUS at 08:05

## 2021-05-04 RX ADMIN — AMPICILLIN AND SULBACTAM 3 G: 2; 1 INJECTION, POWDER, FOR SOLUTION INTRAVENOUS at 03:05

## 2021-05-04 RX ADMIN — ATORVASTATIN CALCIUM 80 MG: 40 TABLET, FILM COATED ORAL at 08:05

## 2021-05-04 RX ADMIN — INSULIN ASPART 8 UNITS: 100 INJECTION, SOLUTION INTRAVENOUS; SUBCUTANEOUS at 11:05

## 2021-05-04 RX ADMIN — Medication 10 ML: at 12:05

## 2021-05-04 RX ADMIN — Medication 10 ML: at 06:05

## 2021-05-06 ENCOUNTER — TELEPHONE (OUTPATIENT)
Dept: PODIATRY | Facility: CLINIC | Age: 51
End: 2021-05-06

## 2021-05-06 ENCOUNTER — PATIENT MESSAGE (OUTPATIENT)
Dept: PODIATRY | Facility: CLINIC | Age: 51
End: 2021-05-06

## 2021-05-06 LAB
FINAL PATHOLOGIC DIAGNOSIS: NORMAL
GAD65 AB SER-SCNC: 0 NMOL/L
Lab: NORMAL

## 2021-05-08 ENCOUNTER — PATIENT OUTREACH (OUTPATIENT)
Dept: ADMINISTRATIVE | Facility: OTHER | Age: 51
End: 2021-05-08

## 2021-05-09 ENCOUNTER — PATIENT MESSAGE (OUTPATIENT)
Dept: INTERNAL MEDICINE | Facility: CLINIC | Age: 51
End: 2021-05-09

## 2021-05-10 LAB
FINAL PATHOLOGIC DIAGNOSIS: NORMAL
Lab: NORMAL

## 2021-05-10 RX ORDER — PEN NEEDLE, DIABETIC 30 GX3/16"
NEEDLE, DISPOSABLE MISCELLANEOUS
Qty: 100 EACH | Refills: 0 | Status: CANCELLED | OUTPATIENT
Start: 2021-05-10

## 2021-05-11 ENCOUNTER — OFFICE VISIT (OUTPATIENT)
Dept: PODIATRY | Facility: CLINIC | Age: 51
End: 2021-05-11
Payer: MEDICAID

## 2021-05-11 VITALS
BODY MASS INDEX: 31.28 KG/M2 | TEMPERATURE: 98 F | HEART RATE: 83 BPM | HEIGHT: 73 IN | RESPIRATION RATE: 18 BRPM | WEIGHT: 236 LBS | SYSTOLIC BLOOD PRESSURE: 179 MMHG | DIASTOLIC BLOOD PRESSURE: 99 MMHG

## 2021-05-11 DIAGNOSIS — L97.524 ULCER OF LEFT FOOT WITH NECROSIS OF BONE: ICD-10-CM

## 2021-05-11 DIAGNOSIS — Z79.4 TYPE 2 DIABETES MELLITUS WITH HYPERGLYCEMIA, WITH LONG-TERM CURRENT USE OF INSULIN: Primary | ICD-10-CM

## 2021-05-11 DIAGNOSIS — E11.65 TYPE 2 DIABETES MELLITUS WITH HYPERGLYCEMIA, WITH LONG-TERM CURRENT USE OF INSULIN: Primary | ICD-10-CM

## 2021-05-11 DIAGNOSIS — M86.9 OSTEOMYELITIS, UNSPECIFIED SITE, UNSPECIFIED TYPE: ICD-10-CM

## 2021-05-11 PROCEDURE — 99999 PR PBB SHADOW E&M-EST. PATIENT-LVL III: CPT | Mod: PBBFAC,,, | Performed by: PODIATRIST

## 2021-05-11 PROCEDURE — 99999 PR PBB SHADOW E&M-EST. PATIENT-LVL III: ICD-10-PCS | Mod: PBBFAC,,, | Performed by: PODIATRIST

## 2021-05-11 PROCEDURE — 99213 OFFICE O/P EST LOW 20 MIN: CPT | Mod: PBBFAC | Performed by: PODIATRIST

## 2021-05-11 PROCEDURE — 99024 PR POST-OP FOLLOW-UP VISIT: ICD-10-PCS | Mod: S$PBB,,, | Performed by: PODIATRIST

## 2021-05-11 PROCEDURE — 99024 POSTOP FOLLOW-UP VISIT: CPT | Mod: S$PBB,,, | Performed by: PODIATRIST

## 2021-05-11 RX ORDER — PEN NEEDLE, DIABETIC 30 GX3/16"
NEEDLE, DISPOSABLE MISCELLANEOUS
Qty: 100 EACH | Refills: 0 | Status: CANCELLED | OUTPATIENT
Start: 2021-05-10

## 2021-05-12 RX ORDER — PEN NEEDLE, DIABETIC 30 GX3/16"
NEEDLE, DISPOSABLE MISCELLANEOUS
Qty: 100 EACH | Refills: 0 | Status: SHIPPED | OUTPATIENT
Start: 2021-05-12 | End: 2021-08-26

## 2021-05-19 ENCOUNTER — OFFICE VISIT (OUTPATIENT)
Dept: INFECTIOUS DISEASES | Facility: CLINIC | Age: 51
End: 2021-05-19
Payer: MEDICAID

## 2021-05-19 ENCOUNTER — OFFICE VISIT (OUTPATIENT)
Dept: PODIATRY | Facility: CLINIC | Age: 51
End: 2021-05-19
Payer: MEDICAID

## 2021-05-19 ENCOUNTER — INFUSION (OUTPATIENT)
Dept: INFECTIOUS DISEASES | Facility: HOSPITAL | Age: 51
End: 2021-05-19
Attending: INTERNAL MEDICINE
Payer: MEDICAID

## 2021-05-19 VITALS — DIASTOLIC BLOOD PRESSURE: 104 MMHG | HEART RATE: 85 BPM | SYSTOLIC BLOOD PRESSURE: 177 MMHG

## 2021-05-19 DIAGNOSIS — A42.9 ACTINOMYCES INFECTION: ICD-10-CM

## 2021-05-19 DIAGNOSIS — E11.65 TYPE 2 DIABETES MELLITUS WITH HYPERGLYCEMIA, WITH LONG-TERM CURRENT USE OF INSULIN: Primary | ICD-10-CM

## 2021-05-19 DIAGNOSIS — A49.8 PROTEUS INFECTION: ICD-10-CM

## 2021-05-19 DIAGNOSIS — Z79.2 RECEIVING INTRAVENOUS ANTIBIOTIC TREATMENT AS OUTPATIENT: ICD-10-CM

## 2021-05-19 DIAGNOSIS — E08.621 DIABETIC ULCER OF RIGHT MIDFOOT ASSOCIATED WITH DIABETES MELLITUS DUE TO UNDERLYING CONDITION, LIMITED TO BREAKDOWN OF SKIN: ICD-10-CM

## 2021-05-19 DIAGNOSIS — L97.411 DIABETIC ULCER OF RIGHT MIDFOOT ASSOCIATED WITH DIABETES MELLITUS DUE TO UNDERLYING CONDITION, LIMITED TO BREAKDOWN OF SKIN: ICD-10-CM

## 2021-05-19 DIAGNOSIS — M86.10 ACUTE ON CHRONIC OSTEOMYELITIS: Primary | ICD-10-CM

## 2021-05-19 DIAGNOSIS — L97.524 ULCER OF LEFT FOOT WITH NECROSIS OF BONE: ICD-10-CM

## 2021-05-19 DIAGNOSIS — M86.60 ACUTE ON CHRONIC OSTEOMYELITIS: Primary | ICD-10-CM

## 2021-05-19 DIAGNOSIS — M86.9 OSTEOMYELITIS, UNSPECIFIED SITE, UNSPECIFIED TYPE: ICD-10-CM

## 2021-05-19 DIAGNOSIS — M86.272 SUBACUTE OSTEOMYELITIS OF LEFT FOOT: ICD-10-CM

## 2021-05-19 DIAGNOSIS — Z79.4 TYPE 2 DIABETES MELLITUS WITH HYPERGLYCEMIA, WITH LONG-TERM CURRENT USE OF INSULIN: Primary | ICD-10-CM

## 2021-05-19 LAB
ALBUMIN SERPL BCP-MCNC: 3.1 G/DL (ref 3.5–5.2)
ALP SERPL-CCNC: 152 U/L (ref 55–135)
ALT SERPL W/O P-5'-P-CCNC: 22 U/L (ref 10–44)
ANION GAP SERPL CALC-SCNC: 7 MMOL/L (ref 8–16)
AST SERPL-CCNC: 19 U/L (ref 10–40)
BASOPHILS # BLD AUTO: 0.03 K/UL (ref 0–0.2)
BASOPHILS NFR BLD: 0.7 % (ref 0–1.9)
BILIRUB SERPL-MCNC: 0.5 MG/DL (ref 0.1–1)
BUN SERPL-MCNC: 13 MG/DL (ref 6–20)
CALCIUM SERPL-MCNC: 9.7 MG/DL (ref 8.7–10.5)
CHLORIDE SERPL-SCNC: 104 MMOL/L (ref 95–110)
CO2 SERPL-SCNC: 29 MMOL/L (ref 23–29)
CREAT SERPL-MCNC: 0.9 MG/DL (ref 0.5–1.4)
CRP SERPL-MCNC: 1.4 MG/L (ref 0–8.2)
DIFFERENTIAL METHOD: ABNORMAL
EOSINOPHIL # BLD AUTO: 0.1 K/UL (ref 0–0.5)
EOSINOPHIL NFR BLD: 2.9 % (ref 0–8)
ERYTHROCYTE [DISTWIDTH] IN BLOOD BY AUTOMATED COUNT: 14 % (ref 11.5–14.5)
ERYTHROCYTE [SEDIMENTATION RATE] IN BLOOD BY WESTERGREN METHOD: 119 MM/HR (ref 0–23)
EST. GFR  (AFRICAN AMERICAN): >60 ML/MIN/1.73 M^2
EST. GFR  (NON AFRICAN AMERICAN): >60 ML/MIN/1.73 M^2
GLUCOSE SERPL-MCNC: 97 MG/DL (ref 70–110)
HCT VFR BLD AUTO: 36.6 % (ref 40–54)
HGB BLD-MCNC: 11.3 G/DL (ref 14–18)
IMM GRANULOCYTES # BLD AUTO: 0.01 K/UL (ref 0–0.04)
IMM GRANULOCYTES NFR BLD AUTO: 0.2 % (ref 0–0.5)
LYMPHOCYTES # BLD AUTO: 1.6 K/UL (ref 1–4.8)
LYMPHOCYTES NFR BLD: 35.1 % (ref 18–48)
MCH RBC QN AUTO: 29 PG (ref 27–31)
MCHC RBC AUTO-ENTMCNC: 30.9 G/DL (ref 32–36)
MCV RBC AUTO: 94 FL (ref 82–98)
MONOCYTES # BLD AUTO: 0.3 K/UL (ref 0.3–1)
MONOCYTES NFR BLD: 7.6 % (ref 4–15)
NEUTROPHILS # BLD AUTO: 2.4 K/UL (ref 1.8–7.7)
NEUTROPHILS NFR BLD: 53.5 % (ref 38–73)
NRBC BLD-RTO: 0 /100 WBC
PLATELET # BLD AUTO: 202 K/UL (ref 150–450)
PMV BLD AUTO: 11.3 FL (ref 9.2–12.9)
POTASSIUM SERPL-SCNC: 3.6 MMOL/L (ref 3.5–5.1)
PROT SERPL-MCNC: 7.4 G/DL (ref 6–8.4)
RBC # BLD AUTO: 3.89 M/UL (ref 4.6–6.2)
SODIUM SERPL-SCNC: 140 MMOL/L (ref 136–145)
WBC # BLD AUTO: 4.47 K/UL (ref 3.9–12.7)

## 2021-05-19 PROCEDURE — 99213 OFFICE O/P EST LOW 20 MIN: CPT | Mod: PBBFAC,25,27 | Performed by: PODIATRIST

## 2021-05-19 PROCEDURE — 36592 COLLECT BLOOD FROM PICC: CPT

## 2021-05-19 PROCEDURE — 99024 POSTOP FOLLOW-UP VISIT: CPT | Mod: S$PBB,,, | Performed by: PODIATRIST

## 2021-05-19 PROCEDURE — 99214 PR OFFICE/OUTPT VISIT, EST, LEVL IV, 30-39 MIN: ICD-10-PCS | Mod: S$PBB,,, | Performed by: INTERNAL MEDICINE

## 2021-05-19 PROCEDURE — 36415 COLL VENOUS BLD VENIPUNCTURE: CPT | Performed by: INTERNAL MEDICINE

## 2021-05-19 PROCEDURE — 99024 PR POST-OP FOLLOW-UP VISIT: ICD-10-PCS | Mod: S$PBB,,, | Performed by: PODIATRIST

## 2021-05-19 PROCEDURE — 99999 PR PBB SHADOW E&M-EST. PATIENT-LVL III: ICD-10-PCS | Mod: PBBFAC,,, | Performed by: PODIATRIST

## 2021-05-19 PROCEDURE — 86140 C-REACTIVE PROTEIN: CPT | Performed by: INTERNAL MEDICINE

## 2021-05-19 PROCEDURE — 85025 COMPLETE CBC W/AUTO DIFF WBC: CPT | Performed by: INTERNAL MEDICINE

## 2021-05-19 PROCEDURE — 99999 PR PBB SHADOW E&M-EST. PATIENT-LVL III: CPT | Mod: PBBFAC,,, | Performed by: PODIATRIST

## 2021-05-19 PROCEDURE — 99212 OFFICE O/P EST SF 10 MIN: CPT | Mod: PBBFAC | Performed by: INTERNAL MEDICINE

## 2021-05-19 PROCEDURE — 99999 PR PBB SHADOW E&M-EST. PATIENT-LVL II: ICD-10-PCS | Mod: PBBFAC,,, | Performed by: INTERNAL MEDICINE

## 2021-05-19 PROCEDURE — 99214 OFFICE O/P EST MOD 30 MIN: CPT | Mod: S$PBB,,, | Performed by: INTERNAL MEDICINE

## 2021-05-19 PROCEDURE — 85652 RBC SED RATE AUTOMATED: CPT | Performed by: INTERNAL MEDICINE

## 2021-05-19 PROCEDURE — 99999 PR PBB SHADOW E&M-EST. PATIENT-LVL II: CPT | Mod: PBBFAC,,, | Performed by: INTERNAL MEDICINE

## 2021-05-19 PROCEDURE — 63600175 PHARM REV CODE 636 W HCPCS: Performed by: INTERNAL MEDICINE

## 2021-05-19 PROCEDURE — 80053 COMPREHEN METABOLIC PANEL: CPT | Performed by: INTERNAL MEDICINE

## 2021-05-19 RX ORDER — SODIUM CHLORIDE 0.9 % (FLUSH) 0.9 %
10 SYRINGE (ML) INJECTION
Status: CANCELLED | OUTPATIENT
Start: 2021-05-19

## 2021-05-19 RX ORDER — HEPARIN 100 UNIT/ML
10 SYRINGE INTRAVENOUS
Status: DISCONTINUED | OUTPATIENT
Start: 2021-05-19 | End: 2023-04-28 | Stop reason: ALTCHOICE

## 2021-05-19 RX ORDER — HEPARIN 100 UNIT/ML
500 SYRINGE INTRAVENOUS
Status: CANCELLED | OUTPATIENT
Start: 2021-05-19

## 2021-05-19 RX ORDER — SODIUM CHLORIDE 0.9 % (FLUSH) 0.9 %
10 SYRINGE (ML) INJECTION
Status: DISCONTINUED | OUTPATIENT
Start: 2021-05-19 | End: 2021-05-19 | Stop reason: HOSPADM

## 2021-05-19 RX ORDER — HEPARIN 100 UNIT/ML
500 SYRINGE INTRAVENOUS
Status: DISCONTINUED | OUTPATIENT
Start: 2021-05-19 | End: 2021-05-19 | Stop reason: HOSPADM

## 2021-05-19 RX ADMIN — HEPARIN 500 UNITS: 100 SYRINGE at 01:05

## 2021-05-26 ENCOUNTER — OFFICE VISIT (OUTPATIENT)
Dept: PODIATRY | Facility: CLINIC | Age: 51
End: 2021-05-26
Payer: MEDICAID

## 2021-05-26 VITALS
RESPIRATION RATE: 18 BRPM | SYSTOLIC BLOOD PRESSURE: 173 MMHG | WEIGHT: 236 LBS | TEMPERATURE: 99 F | BODY MASS INDEX: 31.28 KG/M2 | HEIGHT: 73 IN | HEART RATE: 86 BPM | DIASTOLIC BLOOD PRESSURE: 96 MMHG

## 2021-05-26 DIAGNOSIS — Z79.4 TYPE 2 DIABETES MELLITUS WITH HYPERGLYCEMIA, WITH LONG-TERM CURRENT USE OF INSULIN: ICD-10-CM

## 2021-05-26 DIAGNOSIS — E11.65 TYPE 2 DIABETES MELLITUS WITH HYPERGLYCEMIA, WITH LONG-TERM CURRENT USE OF INSULIN: ICD-10-CM

## 2021-05-26 DIAGNOSIS — M86.9 OSTEOMYELITIS, UNSPECIFIED SITE, UNSPECIFIED TYPE: Primary | ICD-10-CM

## 2021-05-26 DIAGNOSIS — L97.524 ULCER OF LEFT FOOT WITH NECROSIS OF BONE: ICD-10-CM

## 2021-05-26 PROCEDURE — 99214 OFFICE O/P EST MOD 30 MIN: CPT | Mod: PBBFAC | Performed by: PODIATRIST

## 2021-05-26 PROCEDURE — 99024 POSTOP FOLLOW-UP VISIT: CPT | Mod: S$PBB,,, | Performed by: PODIATRIST

## 2021-05-26 PROCEDURE — 87070 CULTURE OTHR SPECIMN AEROBIC: CPT | Performed by: PODIATRIST

## 2021-05-26 PROCEDURE — 87077 CULTURE AEROBIC IDENTIFY: CPT | Performed by: PODIATRIST

## 2021-05-26 PROCEDURE — 99024 PR POST-OP FOLLOW-UP VISIT: ICD-10-PCS | Mod: S$PBB,,, | Performed by: PODIATRIST

## 2021-05-26 PROCEDURE — 87075 CULTR BACTERIA EXCEPT BLOOD: CPT | Performed by: PODIATRIST

## 2021-05-26 PROCEDURE — 99999 PR PBB SHADOW E&M-EST. PATIENT-LVL IV: CPT | Mod: PBBFAC,,, | Performed by: PODIATRIST

## 2021-05-26 PROCEDURE — 87076 CULTURE ANAEROBE IDENT EACH: CPT | Performed by: PODIATRIST

## 2021-05-26 PROCEDURE — 87186 SC STD MICRODIL/AGAR DIL: CPT | Performed by: PODIATRIST

## 2021-05-26 PROCEDURE — 99999 PR PBB SHADOW E&M-EST. PATIENT-LVL IV: ICD-10-PCS | Mod: PBBFAC,,, | Performed by: PODIATRIST

## 2021-05-30 LAB — BACTERIA SPEC AEROBE CULT: ABNORMAL

## 2021-05-31 LAB — BACTERIA SPEC ANAEROBE CULT: NORMAL

## 2021-06-03 ENCOUNTER — OFFICE VISIT (OUTPATIENT)
Dept: PODIATRY | Facility: CLINIC | Age: 51
End: 2021-06-03
Payer: MEDICAID

## 2021-06-03 VITALS
BODY MASS INDEX: 31.14 KG/M2 | RESPIRATION RATE: 18 BRPM | TEMPERATURE: 98 F | HEART RATE: 89 BPM | SYSTOLIC BLOOD PRESSURE: 154 MMHG | DIASTOLIC BLOOD PRESSURE: 86 MMHG | HEIGHT: 73 IN

## 2021-06-03 DIAGNOSIS — E11.65 TYPE 2 DIABETES MELLITUS WITH HYPERGLYCEMIA, WITH LONG-TERM CURRENT USE OF INSULIN: ICD-10-CM

## 2021-06-03 DIAGNOSIS — L97.524 ULCER OF LEFT FOOT WITH NECROSIS OF BONE: ICD-10-CM

## 2021-06-03 DIAGNOSIS — M86.9 OSTEOMYELITIS, UNSPECIFIED SITE, UNSPECIFIED TYPE: Primary | ICD-10-CM

## 2021-06-03 DIAGNOSIS — Z79.4 TYPE 2 DIABETES MELLITUS WITH HYPERGLYCEMIA, WITH LONG-TERM CURRENT USE OF INSULIN: ICD-10-CM

## 2021-06-03 PROCEDURE — 99999 PR PBB SHADOW E&M-EST. PATIENT-LVL III: ICD-10-PCS | Mod: PBBFAC,,, | Performed by: PODIATRIST

## 2021-06-03 PROCEDURE — 11042 DBRDMT SUBQ TIS 1ST 20SQCM/<: CPT | Mod: PBBFAC | Performed by: PODIATRIST

## 2021-06-03 PROCEDURE — 99213 OFFICE O/P EST LOW 20 MIN: CPT | Mod: PBBFAC,25 | Performed by: PODIATRIST

## 2021-06-03 PROCEDURE — 99999 PR PBB SHADOW E&M-EST. PATIENT-LVL III: CPT | Mod: PBBFAC,,, | Performed by: PODIATRIST

## 2021-06-03 PROCEDURE — 99499 UNLISTED E&M SERVICE: CPT | Mod: S$PBB,,, | Performed by: PODIATRIST

## 2021-06-03 PROCEDURE — 99499 NO LOS: ICD-10-PCS | Mod: S$PBB,,, | Performed by: PODIATRIST

## 2021-06-03 PROCEDURE — 99024 POSTOP FOLLOW-UP VISIT: CPT | Mod: S$PBB,,, | Performed by: PODIATRIST

## 2021-06-03 PROCEDURE — 99024 PR POST-OP FOLLOW-UP VISIT: ICD-10-PCS | Mod: S$PBB,,, | Performed by: PODIATRIST

## 2021-06-10 ENCOUNTER — OFFICE VISIT (OUTPATIENT)
Dept: PODIATRY | Facility: CLINIC | Age: 51
End: 2021-06-10
Payer: MEDICAID

## 2021-06-10 VITALS
DIASTOLIC BLOOD PRESSURE: 94 MMHG | RESPIRATION RATE: 18 BRPM | BODY MASS INDEX: 31.14 KG/M2 | HEIGHT: 73 IN | SYSTOLIC BLOOD PRESSURE: 170 MMHG | HEART RATE: 83 BPM | TEMPERATURE: 98 F

## 2021-06-10 DIAGNOSIS — M86.9 OSTEOMYELITIS, UNSPECIFIED SITE, UNSPECIFIED TYPE: ICD-10-CM

## 2021-06-10 DIAGNOSIS — E11.65 TYPE 2 DIABETES MELLITUS WITH HYPERGLYCEMIA, WITH LONG-TERM CURRENT USE OF INSULIN: Primary | ICD-10-CM

## 2021-06-10 DIAGNOSIS — Z79.4 TYPE 2 DIABETES MELLITUS WITH HYPERGLYCEMIA, WITH LONG-TERM CURRENT USE OF INSULIN: Primary | ICD-10-CM

## 2021-06-10 DIAGNOSIS — L97.524 ULCER OF LEFT FOOT WITH NECROSIS OF BONE: ICD-10-CM

## 2021-06-10 PROCEDURE — 99999 PR PBB SHADOW E&M-EST. PATIENT-LVL III: CPT | Mod: PBBFAC,,, | Performed by: PODIATRIST

## 2021-06-10 PROCEDURE — 99999 PR PBB SHADOW E&M-EST. PATIENT-LVL III: ICD-10-PCS | Mod: PBBFAC,,, | Performed by: PODIATRIST

## 2021-06-10 PROCEDURE — 99499 UNLISTED E&M SERVICE: CPT | Mod: S$PBB,,, | Performed by: PODIATRIST

## 2021-06-10 PROCEDURE — 99213 OFFICE O/P EST LOW 20 MIN: CPT | Mod: PBBFAC,25 | Performed by: PODIATRIST

## 2021-06-10 PROCEDURE — 99499 NO LOS: ICD-10-PCS | Mod: S$PBB,,, | Performed by: PODIATRIST

## 2021-06-10 PROCEDURE — 11042 DBRDMT SUBQ TIS 1ST 20SQCM/<: CPT | Mod: PBBFAC | Performed by: PODIATRIST

## 2021-06-10 PROCEDURE — 99024 POSTOP FOLLOW-UP VISIT: CPT | Mod: S$PBB,,, | Performed by: PODIATRIST

## 2021-06-10 PROCEDURE — 99024 PR POST-OP FOLLOW-UP VISIT: ICD-10-PCS | Mod: S$PBB,,, | Performed by: PODIATRIST

## 2021-06-11 ENCOUNTER — PATIENT OUTREACH (OUTPATIENT)
Dept: ADMINISTRATIVE | Facility: OTHER | Age: 51
End: 2021-06-11

## 2021-06-11 ENCOUNTER — OFFICE VISIT (OUTPATIENT)
Dept: INFECTIOUS DISEASES | Facility: CLINIC | Age: 51
End: 2021-06-11
Payer: MEDICAID

## 2021-06-11 ENCOUNTER — INFUSION (OUTPATIENT)
Dept: INFECTIOUS DISEASES | Facility: HOSPITAL | Age: 51
End: 2021-06-11
Attending: INTERNAL MEDICINE
Payer: MEDICAID

## 2021-06-11 VITALS
WEIGHT: 237.88 LBS | DIASTOLIC BLOOD PRESSURE: 95 MMHG | HEART RATE: 82 BPM | TEMPERATURE: 98 F | HEIGHT: 73 IN | BODY MASS INDEX: 31.53 KG/M2 | SYSTOLIC BLOOD PRESSURE: 172 MMHG

## 2021-06-11 DIAGNOSIS — A49.8 PROTEUS INFECTION: ICD-10-CM

## 2021-06-11 DIAGNOSIS — A42.9 ACTINOMYCES INFECTION: ICD-10-CM

## 2021-06-11 DIAGNOSIS — M86.672 CHRONIC OSTEOMYELITIS OF LEFT FOOT: Primary | ICD-10-CM

## 2021-06-11 DIAGNOSIS — Z79.2 RECEIVING INTRAVENOUS ANTIBIOTIC TREATMENT AS OUTPATIENT: ICD-10-CM

## 2021-06-11 PROCEDURE — 99214 OFFICE O/P EST MOD 30 MIN: CPT | Mod: S$PBB,,, | Performed by: INTERNAL MEDICINE

## 2021-06-11 PROCEDURE — 99214 PR OFFICE/OUTPT VISIT, EST, LEVL IV, 30-39 MIN: ICD-10-PCS | Mod: S$PBB,,, | Performed by: INTERNAL MEDICINE

## 2021-06-11 PROCEDURE — 99999 PR PBB SHADOW E&M-EST. PATIENT-LVL III: CPT | Mod: PBBFAC,,, | Performed by: INTERNAL MEDICINE

## 2021-06-11 PROCEDURE — 99213 OFFICE O/P EST LOW 20 MIN: CPT | Mod: PBBFAC | Performed by: INTERNAL MEDICINE

## 2021-06-11 PROCEDURE — 99999 PR PBB SHADOW E&M-EST. PATIENT-LVL III: ICD-10-PCS | Mod: PBBFAC,,, | Performed by: INTERNAL MEDICINE

## 2021-06-11 RX ORDER — AMOXICILLIN AND CLAVULANATE POTASSIUM 875; 125 MG/1; MG/1
1 TABLET, FILM COATED ORAL 2 TIMES DAILY
Qty: 60 TABLET | Refills: 2 | Status: SHIPPED | OUTPATIENT
Start: 2021-06-11 | End: 2021-09-09

## 2021-06-14 ENCOUNTER — OFFICE VISIT (OUTPATIENT)
Dept: PODIATRY | Facility: CLINIC | Age: 51
End: 2021-06-14
Payer: MEDICAID

## 2021-06-14 VITALS
HEIGHT: 73 IN | HEART RATE: 82 BPM | SYSTOLIC BLOOD PRESSURE: 163 MMHG | WEIGHT: 237 LBS | BODY MASS INDEX: 31.41 KG/M2 | DIASTOLIC BLOOD PRESSURE: 100 MMHG | RESPIRATION RATE: 18 BRPM

## 2021-06-14 DIAGNOSIS — E11.65 TYPE 2 DIABETES MELLITUS WITH HYPERGLYCEMIA, WITH LONG-TERM CURRENT USE OF INSULIN: Primary | ICD-10-CM

## 2021-06-14 DIAGNOSIS — M86.9 OSTEOMYELITIS, UNSPECIFIED SITE, UNSPECIFIED TYPE: ICD-10-CM

## 2021-06-14 DIAGNOSIS — L97.523 ULCER OF LEFT FOOT WITH NECROSIS OF MUSCLE: ICD-10-CM

## 2021-06-14 DIAGNOSIS — Z79.4 TYPE 2 DIABETES MELLITUS WITH HYPERGLYCEMIA, WITH LONG-TERM CURRENT USE OF INSULIN: Primary | ICD-10-CM

## 2021-06-14 DIAGNOSIS — Z86.31 HEALED DIABETIC ULCER OF FOOT: ICD-10-CM

## 2021-06-14 PROCEDURE — 99999 PR PBB SHADOW E&M-EST. PATIENT-LVL III: CPT | Mod: PBBFAC,,, | Performed by: PODIATRIST

## 2021-06-14 PROCEDURE — 99999 PR PBB SHADOW E&M-EST. PATIENT-LVL III: ICD-10-PCS | Mod: PBBFAC,,, | Performed by: PODIATRIST

## 2021-06-14 PROCEDURE — 99024 PR POST-OP FOLLOW-UP VISIT: ICD-10-PCS | Mod: S$PBB,,, | Performed by: PODIATRIST

## 2021-06-14 PROCEDURE — 99213 OFFICE O/P EST LOW 20 MIN: CPT | Mod: PBBFAC | Performed by: PODIATRIST

## 2021-06-14 PROCEDURE — 99024 POSTOP FOLLOW-UP VISIT: CPT | Mod: S$PBB,,, | Performed by: PODIATRIST

## 2021-06-17 ENCOUNTER — OFFICE VISIT (OUTPATIENT)
Dept: PODIATRY | Facility: CLINIC | Age: 51
End: 2021-06-17
Payer: MEDICAID

## 2021-06-17 ENCOUNTER — TELEPHONE (OUTPATIENT)
Dept: PHARMACY | Facility: CLINIC | Age: 51
End: 2021-06-17

## 2021-06-17 VITALS
SYSTOLIC BLOOD PRESSURE: 150 MMHG | BODY MASS INDEX: 31.14 KG/M2 | WEIGHT: 235 LBS | HEIGHT: 73 IN | RESPIRATION RATE: 18 BRPM | HEART RATE: 85 BPM | TEMPERATURE: 99 F | DIASTOLIC BLOOD PRESSURE: 87 MMHG

## 2021-06-17 DIAGNOSIS — E11.65 TYPE 2 DIABETES MELLITUS WITH HYPERGLYCEMIA, WITH LONG-TERM CURRENT USE OF INSULIN: Primary | ICD-10-CM

## 2021-06-17 DIAGNOSIS — L97.523 ULCER OF LEFT FOOT WITH NECROSIS OF MUSCLE: ICD-10-CM

## 2021-06-17 DIAGNOSIS — Z79.4 TYPE 2 DIABETES MELLITUS WITH HYPERGLYCEMIA, WITH LONG-TERM CURRENT USE OF INSULIN: Primary | ICD-10-CM

## 2021-06-17 DIAGNOSIS — R60.0 LEG EDEMA, LEFT: ICD-10-CM

## 2021-06-17 PROCEDURE — 99999 PR PBB SHADOW E&M-EST. PATIENT-LVL IV: CPT | Mod: PBBFAC,,, | Performed by: PODIATRIST

## 2021-06-17 PROCEDURE — 99024 POSTOP FOLLOW-UP VISIT: CPT | Mod: S$PBB,,, | Performed by: PODIATRIST

## 2021-06-17 PROCEDURE — 99024 PR POST-OP FOLLOW-UP VISIT: ICD-10-PCS | Mod: S$PBB,,, | Performed by: PODIATRIST

## 2021-06-17 PROCEDURE — 29580 STRAPPING UNNA BOOT: CPT | Mod: PBBFAC,LT | Performed by: PODIATRIST

## 2021-06-17 PROCEDURE — 99214 OFFICE O/P EST MOD 30 MIN: CPT | Mod: PBBFAC,25 | Performed by: PODIATRIST

## 2021-06-17 PROCEDURE — 11043 DBRDMT MUSC&/FSCA 1ST 20/<: CPT | Mod: PBBFAC,LT | Performed by: PODIATRIST

## 2021-06-17 PROCEDURE — 99999 PR PBB SHADOW E&M-EST. PATIENT-LVL IV: ICD-10-PCS | Mod: PBBFAC,,, | Performed by: PODIATRIST

## 2021-06-22 ENCOUNTER — OFFICE VISIT (OUTPATIENT)
Dept: PODIATRY | Facility: CLINIC | Age: 51
End: 2021-06-22
Payer: MEDICAID

## 2021-06-22 VITALS
HEART RATE: 77 BPM | WEIGHT: 235 LBS | RESPIRATION RATE: 18 BRPM | SYSTOLIC BLOOD PRESSURE: 121 MMHG | HEIGHT: 73 IN | BODY MASS INDEX: 31.14 KG/M2 | DIASTOLIC BLOOD PRESSURE: 77 MMHG

## 2021-06-22 DIAGNOSIS — R60.0 LEG EDEMA, LEFT: ICD-10-CM

## 2021-06-22 DIAGNOSIS — L97.523 ULCER OF LEFT FOOT WITH NECROSIS OF MUSCLE: ICD-10-CM

## 2021-06-22 DIAGNOSIS — E11.65 TYPE 2 DIABETES MELLITUS WITH HYPERGLYCEMIA, WITH LONG-TERM CURRENT USE OF INSULIN: Primary | ICD-10-CM

## 2021-06-22 DIAGNOSIS — Z79.4 TYPE 2 DIABETES MELLITUS WITH HYPERGLYCEMIA, WITH LONG-TERM CURRENT USE OF INSULIN: Primary | ICD-10-CM

## 2021-06-22 PROCEDURE — 99999 PR PBB SHADOW E&M-EST. PATIENT-LVL IV: CPT | Mod: PBBFAC,,, | Performed by: PODIATRIST

## 2021-06-22 PROCEDURE — 99499 NO LOS: ICD-10-PCS | Mod: S$PBB,,, | Performed by: PODIATRIST

## 2021-06-22 PROCEDURE — 99999 PR PBB SHADOW E&M-EST. PATIENT-LVL IV: ICD-10-PCS | Mod: PBBFAC,,, | Performed by: PODIATRIST

## 2021-06-22 PROCEDURE — 99499 UNLISTED E&M SERVICE: CPT | Mod: S$PBB,,, | Performed by: PODIATRIST

## 2021-06-22 PROCEDURE — 99214 OFFICE O/P EST MOD 30 MIN: CPT | Mod: PBBFAC | Performed by: PODIATRIST

## 2021-06-23 ENCOUNTER — TELEPHONE (OUTPATIENT)
Dept: PHARMACY | Facility: CLINIC | Age: 51
End: 2021-06-23

## 2021-06-29 ENCOUNTER — OFFICE VISIT (OUTPATIENT)
Dept: PODIATRY | Facility: CLINIC | Age: 51
End: 2021-06-29
Payer: MEDICAID

## 2021-06-29 ENCOUNTER — PATIENT MESSAGE (OUTPATIENT)
Dept: PODIATRY | Facility: CLINIC | Age: 51
End: 2021-06-29

## 2021-06-29 VITALS — WEIGHT: 235 LBS | HEIGHT: 73 IN | BODY MASS INDEX: 31.14 KG/M2

## 2021-06-29 DIAGNOSIS — Z12.11 SPECIAL SCREENING FOR MALIGNANT NEOPLASM OF COLON: Primary | ICD-10-CM

## 2021-06-29 DIAGNOSIS — L97.523 ULCER OF LEFT FOOT WITH NECROSIS OF MUSCLE: ICD-10-CM

## 2021-06-29 DIAGNOSIS — Z79.4 TYPE 2 DIABETES MELLITUS WITH HYPERGLYCEMIA, WITH LONG-TERM CURRENT USE OF INSULIN: Primary | ICD-10-CM

## 2021-06-29 DIAGNOSIS — E11.65 TYPE 2 DIABETES MELLITUS WITH HYPERGLYCEMIA, WITH LONG-TERM CURRENT USE OF INSULIN: Primary | ICD-10-CM

## 2021-06-29 PROCEDURE — 99212 OFFICE O/P EST SF 10 MIN: CPT | Mod: PBBFAC | Performed by: PODIATRIST

## 2021-06-29 PROCEDURE — 99999 PR PBB SHADOW E&M-EST. PATIENT-LVL II: CPT | Mod: PBBFAC,,, | Performed by: PODIATRIST

## 2021-06-29 PROCEDURE — 99024 PR POST-OP FOLLOW-UP VISIT: ICD-10-PCS | Mod: S$PBB,,, | Performed by: PODIATRIST

## 2021-06-29 PROCEDURE — 99499 UNLISTED E&M SERVICE: CPT | Mod: S$PBB,,, | Performed by: PODIATRIST

## 2021-06-29 PROCEDURE — 99499 NO LOS: ICD-10-PCS | Mod: S$PBB,,, | Performed by: PODIATRIST

## 2021-06-29 PROCEDURE — 99999 PR PBB SHADOW E&M-EST. PATIENT-LVL II: ICD-10-PCS | Mod: PBBFAC,,, | Performed by: PODIATRIST

## 2021-06-29 PROCEDURE — 11042 DBRDMT SUBQ TIS 1ST 20SQCM/<: CPT | Mod: PBBFAC | Performed by: PODIATRIST

## 2021-06-29 PROCEDURE — 99024 POSTOP FOLLOW-UP VISIT: CPT | Mod: S$PBB,,, | Performed by: PODIATRIST

## 2021-06-30 ENCOUNTER — TELEPHONE (OUTPATIENT)
Dept: PODIATRY | Facility: CLINIC | Age: 51
End: 2021-06-30

## 2021-06-30 ENCOUNTER — PATIENT MESSAGE (OUTPATIENT)
Dept: PODIATRY | Facility: CLINIC | Age: 51
End: 2021-06-30

## 2021-07-02 ENCOUNTER — OFFICE VISIT (OUTPATIENT)
Dept: PODIATRY | Facility: CLINIC | Age: 51
End: 2021-07-02
Payer: MEDICAID

## 2021-07-02 VITALS
DIASTOLIC BLOOD PRESSURE: 89 MMHG | SYSTOLIC BLOOD PRESSURE: 149 MMHG | WEIGHT: 235 LBS | HEART RATE: 81 BPM | BODY MASS INDEX: 31.01 KG/M2

## 2021-07-02 DIAGNOSIS — L97.523 ULCER OF LEFT FOOT WITH NECROSIS OF MUSCLE: Primary | ICD-10-CM

## 2021-07-02 DIAGNOSIS — R60.0 EDEMA OF LEFT LOWER LEG: ICD-10-CM

## 2021-07-02 DIAGNOSIS — M86.9 OSTEOMYELITIS, UNSPECIFIED SITE, UNSPECIFIED TYPE: ICD-10-CM

## 2021-07-02 PROCEDURE — 87075 CULTR BACTERIA EXCEPT BLOOD: CPT | Performed by: PODIATRIST

## 2021-07-02 PROCEDURE — 20240 BONE BIOPSY OPEN SUPERFICIAL: CPT | Mod: PBBFAC,LT | Performed by: PODIATRIST

## 2021-07-02 PROCEDURE — 29580 STRAPPING UNNA BOOT: CPT | Mod: 58,S$PBB,LT, | Performed by: PODIATRIST

## 2021-07-02 PROCEDURE — 88311 DECALCIFY TISSUE: CPT | Performed by: STUDENT IN AN ORGANIZED HEALTH CARE EDUCATION/TRAINING PROGRAM

## 2021-07-02 PROCEDURE — 99024 PR POST-OP FOLLOW-UP VISIT: ICD-10-PCS | Mod: S$PBB,,, | Performed by: PODIATRIST

## 2021-07-02 PROCEDURE — 87186 SC STD MICRODIL/AGAR DIL: CPT | Performed by: PODIATRIST

## 2021-07-02 PROCEDURE — 99213 OFFICE O/P EST LOW 20 MIN: CPT | Mod: PBBFAC,25 | Performed by: PODIATRIST

## 2021-07-02 PROCEDURE — 88307 PR  SURG PATH,LEVEL V: ICD-10-PCS | Mod: 26,,, | Performed by: STUDENT IN AN ORGANIZED HEALTH CARE EDUCATION/TRAINING PROGRAM

## 2021-07-02 PROCEDURE — 99999 PR PBB SHADOW E&M-EST. PATIENT-LVL III: ICD-10-PCS | Mod: PBBFAC,,, | Performed by: PODIATRIST

## 2021-07-02 PROCEDURE — 99999 PR PBB SHADOW E&M-EST. PATIENT-LVL III: CPT | Mod: PBBFAC,,, | Performed by: PODIATRIST

## 2021-07-02 PROCEDURE — 20240 PR BIOPSY, BONE, OPEN, EXC; SUPERFICIAL: ICD-10-PCS | Mod: 58,S$PBB,51, | Performed by: PODIATRIST

## 2021-07-02 PROCEDURE — 29580 STRAPPING UNNA BOOT: CPT | Mod: PBBFAC,LT | Performed by: PODIATRIST

## 2021-07-02 PROCEDURE — 87070 CULTURE OTHR SPECIMN AEROBIC: CPT | Performed by: PODIATRIST

## 2021-07-02 PROCEDURE — 88311 PR  DECALCIFY TISSUE: ICD-10-PCS | Mod: 26,,, | Performed by: STUDENT IN AN ORGANIZED HEALTH CARE EDUCATION/TRAINING PROGRAM

## 2021-07-02 PROCEDURE — 20240 BONE BIOPSY OPEN SUPERFICIAL: CPT | Mod: 58,S$PBB,51, | Performed by: PODIATRIST

## 2021-07-02 PROCEDURE — 87077 CULTURE AEROBIC IDENTIFY: CPT | Performed by: PODIATRIST

## 2021-07-02 PROCEDURE — 99024 POSTOP FOLLOW-UP VISIT: CPT | Mod: S$PBB,,, | Performed by: PODIATRIST

## 2021-07-02 PROCEDURE — 88311 DECALCIFY TISSUE: CPT | Mod: 26,,, | Performed by: STUDENT IN AN ORGANIZED HEALTH CARE EDUCATION/TRAINING PROGRAM

## 2021-07-02 PROCEDURE — 11043 DBRDMT MUSC&/FSCA 1ST 20/<: CPT | Mod: PBBFAC,LT | Performed by: PODIATRIST

## 2021-07-02 PROCEDURE — 29580 PR STRAPPING UNNA BOOT: ICD-10-PCS | Mod: 58,S$PBB,LT, | Performed by: PODIATRIST

## 2021-07-02 PROCEDURE — 88307 TISSUE EXAM BY PATHOLOGIST: CPT | Performed by: STUDENT IN AN ORGANIZED HEALTH CARE EDUCATION/TRAINING PROGRAM

## 2021-07-02 PROCEDURE — 88307 TISSUE EXAM BY PATHOLOGIST: CPT | Mod: 26,,, | Performed by: STUDENT IN AN ORGANIZED HEALTH CARE EDUCATION/TRAINING PROGRAM

## 2021-07-04 LAB — BACTERIA SPEC AEROBE CULT: ABNORMAL

## 2021-07-04 RX ORDER — SULFAMETHOXAZOLE AND TRIMETHOPRIM 400; 80 MG/1; MG/1
1 TABLET ORAL 2 TIMES DAILY
Qty: 20 TABLET | Refills: 0 | Status: SHIPPED | OUTPATIENT
Start: 2021-07-04 | End: 2021-07-07 | Stop reason: CLARIF

## 2021-07-06 ENCOUNTER — PATIENT MESSAGE (OUTPATIENT)
Dept: INFECTIOUS DISEASES | Facility: CLINIC | Age: 51
End: 2021-07-06

## 2021-07-07 ENCOUNTER — OFFICE VISIT (OUTPATIENT)
Dept: PODIATRY | Facility: CLINIC | Age: 51
End: 2021-07-07
Payer: MEDICAID

## 2021-07-07 ENCOUNTER — PATIENT MESSAGE (OUTPATIENT)
Dept: ADMINISTRATIVE | Facility: HOSPITAL | Age: 51
End: 2021-07-07

## 2021-07-07 ENCOUNTER — LAB VISIT (OUTPATIENT)
Dept: LAB | Facility: HOSPITAL | Age: 51
End: 2021-07-07
Attending: PODIATRIST
Payer: MEDICAID

## 2021-07-07 VITALS
WEIGHT: 235 LBS | TEMPERATURE: 98 F | BODY MASS INDEX: 31.14 KG/M2 | DIASTOLIC BLOOD PRESSURE: 81 MMHG | HEIGHT: 73 IN | SYSTOLIC BLOOD PRESSURE: 128 MMHG | RESPIRATION RATE: 18 BRPM | HEART RATE: 95 BPM

## 2021-07-07 DIAGNOSIS — M86.9 OSTEOMYELITIS, UNSPECIFIED SITE, UNSPECIFIED TYPE: Primary | ICD-10-CM

## 2021-07-07 DIAGNOSIS — L97.523 ULCER OF LEFT FOOT WITH NECROSIS OF MUSCLE: ICD-10-CM

## 2021-07-07 DIAGNOSIS — Z79.4 TYPE 2 DIABETES MELLITUS WITH HYPERGLYCEMIA, WITH LONG-TERM CURRENT USE OF INSULIN: ICD-10-CM

## 2021-07-07 DIAGNOSIS — E11.65 TYPE 2 DIABETES MELLITUS WITH HYPERGLYCEMIA, WITH LONG-TERM CURRENT USE OF INSULIN: ICD-10-CM

## 2021-07-07 DIAGNOSIS — M86.9 OSTEOMYELITIS, UNSPECIFIED SITE, UNSPECIFIED TYPE: ICD-10-CM

## 2021-07-07 LAB
ALBUMIN SERPL BCP-MCNC: 2.5 G/DL (ref 3.5–5.2)
ALP SERPL-CCNC: 161 U/L (ref 55–135)
ALT SERPL W/O P-5'-P-CCNC: 11 U/L (ref 10–44)
ANION GAP SERPL CALC-SCNC: 12 MMOL/L (ref 8–16)
AST SERPL-CCNC: 15 U/L (ref 10–40)
BASOPHILS # BLD AUTO: 0.02 K/UL (ref 0–0.2)
BASOPHILS NFR BLD: 0.3 % (ref 0–1.9)
BILIRUB SERPL-MCNC: 0.6 MG/DL (ref 0.1–1)
BUN SERPL-MCNC: 9 MG/DL (ref 6–20)
CALCIUM SERPL-MCNC: 10.1 MG/DL (ref 8.7–10.5)
CHLORIDE SERPL-SCNC: 100 MMOL/L (ref 95–110)
CO2 SERPL-SCNC: 29 MMOL/L (ref 23–29)
CREAT SERPL-MCNC: 1.1 MG/DL (ref 0.5–1.4)
CRP SERPL-MCNC: 85.5 MG/L (ref 0–8.2)
DIFFERENTIAL METHOD: ABNORMAL
EOSINOPHIL # BLD AUTO: 0.2 K/UL (ref 0–0.5)
EOSINOPHIL NFR BLD: 2.6 % (ref 0–8)
ERYTHROCYTE [DISTWIDTH] IN BLOOD BY AUTOMATED COUNT: 12.9 % (ref 11.5–14.5)
ERYTHROCYTE [SEDIMENTATION RATE] IN BLOOD BY WESTERGREN METHOD: 65 MM/HR (ref 0–23)
EST. GFR  (AFRICAN AMERICAN): >60 ML/MIN/1.73 M^2
EST. GFR  (NON AFRICAN AMERICAN): >60 ML/MIN/1.73 M^2
GLUCOSE SERPL-MCNC: 79 MG/DL (ref 70–110)
HCT VFR BLD AUTO: 38.4 % (ref 40–54)
HGB BLD-MCNC: 12 G/DL (ref 14–18)
IMM GRANULOCYTES # BLD AUTO: 0.02 K/UL (ref 0–0.04)
IMM GRANULOCYTES NFR BLD AUTO: 0.3 % (ref 0–0.5)
LYMPHOCYTES # BLD AUTO: 1.3 K/UL (ref 1–4.8)
LYMPHOCYTES NFR BLD: 20.5 % (ref 18–48)
MCH RBC QN AUTO: 27.8 PG (ref 27–31)
MCHC RBC AUTO-ENTMCNC: 31.3 G/DL (ref 32–36)
MCV RBC AUTO: 89 FL (ref 82–98)
MONOCYTES # BLD AUTO: 0.5 K/UL (ref 0.3–1)
MONOCYTES NFR BLD: 8 % (ref 4–15)
NEUTROPHILS # BLD AUTO: 4.3 K/UL (ref 1.8–7.7)
NEUTROPHILS NFR BLD: 68.3 % (ref 38–73)
NRBC BLD-RTO: 0 /100 WBC
PLATELET # BLD AUTO: 369 K/UL (ref 150–450)
PMV BLD AUTO: 9.8 FL (ref 9.2–12.9)
POTASSIUM SERPL-SCNC: 4 MMOL/L (ref 3.5–5.1)
PROT SERPL-MCNC: 8.4 G/DL (ref 6–8.4)
RBC # BLD AUTO: 4.31 M/UL (ref 4.6–6.2)
SODIUM SERPL-SCNC: 141 MMOL/L (ref 136–145)
WBC # BLD AUTO: 6.24 K/UL (ref 3.9–12.7)

## 2021-07-07 PROCEDURE — 86140 C-REACTIVE PROTEIN: CPT | Performed by: PODIATRIST

## 2021-07-07 PROCEDURE — 99024 POSTOP FOLLOW-UP VISIT: CPT | Mod: S$PBB,,, | Performed by: PODIATRIST

## 2021-07-07 PROCEDURE — 99024 PR POST-OP FOLLOW-UP VISIT: ICD-10-PCS | Mod: S$PBB,,, | Performed by: PODIATRIST

## 2021-07-07 PROCEDURE — 85025 COMPLETE CBC W/AUTO DIFF WBC: CPT | Performed by: PODIATRIST

## 2021-07-07 PROCEDURE — 11044 DBRDMT BONE 1ST 20 SQ CM/<: CPT | Mod: PBBFAC | Performed by: PODIATRIST

## 2021-07-07 PROCEDURE — 36415 COLL VENOUS BLD VENIPUNCTURE: CPT | Performed by: PODIATRIST

## 2021-07-07 PROCEDURE — 85652 RBC SED RATE AUTOMATED: CPT | Performed by: PODIATRIST

## 2021-07-07 PROCEDURE — 99999 PR PBB SHADOW E&M-EST. PATIENT-LVL IV: ICD-10-PCS | Mod: PBBFAC,,, | Performed by: PODIATRIST

## 2021-07-07 PROCEDURE — 99999 PR PBB SHADOW E&M-EST. PATIENT-LVL IV: CPT | Mod: PBBFAC,,, | Performed by: PODIATRIST

## 2021-07-07 PROCEDURE — 99214 OFFICE O/P EST MOD 30 MIN: CPT | Mod: PBBFAC | Performed by: PODIATRIST

## 2021-07-07 PROCEDURE — 80053 COMPREHEN METABOLIC PANEL: CPT | Performed by: PODIATRIST

## 2021-07-07 RX ORDER — CIPROFLOXACIN 500 MG/1
500 TABLET ORAL EVERY 12 HOURS
Qty: 20 TABLET | Refills: 0 | Status: SHIPPED | OUTPATIENT
Start: 2021-07-07 | End: 2021-07-13

## 2021-07-08 ENCOUNTER — TELEPHONE (OUTPATIENT)
Dept: PODIATRY | Facility: CLINIC | Age: 51
End: 2021-07-08

## 2021-07-08 LAB — BACTERIA SPEC ANAEROBE CULT: NORMAL

## 2021-07-12 LAB
FINAL PATHOLOGIC DIAGNOSIS: NORMAL
Lab: NORMAL

## 2021-07-13 ENCOUNTER — OFFICE VISIT (OUTPATIENT)
Dept: INFECTIOUS DISEASES | Facility: CLINIC | Age: 51
End: 2021-07-13
Payer: MEDICAID

## 2021-07-13 ENCOUNTER — OFFICE VISIT (OUTPATIENT)
Dept: PODIATRY | Facility: CLINIC | Age: 51
End: 2021-07-13
Payer: MEDICAID

## 2021-07-13 ENCOUNTER — TELEPHONE (OUTPATIENT)
Dept: PODIATRY | Facility: CLINIC | Age: 51
End: 2021-07-13

## 2021-07-13 VITALS
HEART RATE: 94 BPM | BODY MASS INDEX: 31.14 KG/M2 | TEMPERATURE: 99 F | SYSTOLIC BLOOD PRESSURE: 137 MMHG | WEIGHT: 235 LBS | HEIGHT: 73 IN | RESPIRATION RATE: 18 BRPM | DIASTOLIC BLOOD PRESSURE: 82 MMHG

## 2021-07-13 DIAGNOSIS — L97.523 ULCER OF LEFT FOOT WITH NECROSIS OF MUSCLE: ICD-10-CM

## 2021-07-13 DIAGNOSIS — R60.0 EDEMA OF LEFT LOWER LEG: ICD-10-CM

## 2021-07-13 DIAGNOSIS — M86.9 OSTEOMYELITIS, UNSPECIFIED SITE, UNSPECIFIED TYPE: ICD-10-CM

## 2021-07-13 DIAGNOSIS — Z79.4 TYPE 2 DIABETES MELLITUS WITH HYPERGLYCEMIA, WITH LONG-TERM CURRENT USE OF INSULIN: ICD-10-CM

## 2021-07-13 DIAGNOSIS — E11.65 TYPE 2 DIABETES MELLITUS WITH HYPERGLYCEMIA, WITH LONG-TERM CURRENT USE OF INSULIN: ICD-10-CM

## 2021-07-13 DIAGNOSIS — M86.9 OSTEOMYELITIS, UNSPECIFIED SITE, UNSPECIFIED TYPE: Primary | ICD-10-CM

## 2021-07-13 DIAGNOSIS — A49.8 BACTERIAL INFECTION DUE TO MORGANELLA MORGANII: Primary | ICD-10-CM

## 2021-07-13 PROCEDURE — 99024 PR POST-OP FOLLOW-UP VISIT: ICD-10-PCS | Mod: S$PBB,,, | Performed by: PODIATRIST

## 2021-07-13 PROCEDURE — 99214 PR OFFICE/OUTPT VISIT, EST, LEVL IV, 30-39 MIN: ICD-10-PCS | Mod: 95,,, | Performed by: INTERNAL MEDICINE

## 2021-07-13 PROCEDURE — 99499 UNLISTED E&M SERVICE: CPT | Mod: S$PBB,,, | Performed by: PODIATRIST

## 2021-07-13 PROCEDURE — 99999 PR PBB SHADOW E&M-EST. PATIENT-LVL IV: ICD-10-PCS | Mod: PBBFAC,,, | Performed by: PODIATRIST

## 2021-07-13 PROCEDURE — 99214 OFFICE O/P EST MOD 30 MIN: CPT | Mod: 95,,, | Performed by: INTERNAL MEDICINE

## 2021-07-13 PROCEDURE — 99499 NO LOS: ICD-10-PCS | Mod: S$PBB,,, | Performed by: PODIATRIST

## 2021-07-13 PROCEDURE — 99024 POSTOP FOLLOW-UP VISIT: CPT | Performed by: PODIATRIST

## 2021-07-13 PROCEDURE — 99999 PR PBB SHADOW E&M-EST. PATIENT-LVL IV: CPT | Mod: PBBFAC,,, | Performed by: PODIATRIST

## 2021-07-13 PROCEDURE — 99214 OFFICE O/P EST MOD 30 MIN: CPT | Mod: PBBFAC | Performed by: PODIATRIST

## 2021-07-13 PROCEDURE — 11044 DBRDMT BONE 1ST 20 SQ CM/<: CPT | Mod: PBBFAC | Performed by: PODIATRIST

## 2021-07-13 PROCEDURE — 99024 POSTOP FOLLOW-UP VISIT: CPT | Mod: S$PBB,,, | Performed by: PODIATRIST

## 2021-07-13 RX ORDER — CIPROFLOXACIN 500 MG/1
500 TABLET ORAL EVERY 12 HOURS
Qty: 64 TABLET | Refills: 0 | Status: SHIPPED | OUTPATIENT
Start: 2021-07-13 | End: 2021-08-16

## 2021-07-14 ENCOUNTER — LAB VISIT (OUTPATIENT)
Dept: LAB | Facility: HOSPITAL | Age: 51
End: 2021-07-14
Attending: INTERNAL MEDICINE
Payer: MEDICAID

## 2021-07-14 DIAGNOSIS — E11.9 TYPE 2 DIABETES MELLITUS WITHOUT COMPLICATION: ICD-10-CM

## 2021-07-14 LAB
ESTIMATED AVG GLUCOSE: 243 MG/DL (ref 68–131)
HBA1C MFR BLD: 10.1 % (ref 4–5.6)

## 2021-07-14 PROCEDURE — 80061 LIPID PANEL: CPT | Performed by: INTERNAL MEDICINE

## 2021-07-14 PROCEDURE — 36415 COLL VENOUS BLD VENIPUNCTURE: CPT | Performed by: INTERNAL MEDICINE

## 2021-07-14 PROCEDURE — 83036 HEMOGLOBIN GLYCOSYLATED A1C: CPT | Performed by: INTERNAL MEDICINE

## 2021-07-15 LAB
CHOLEST SERPL-MCNC: 100 MG/DL (ref 120–199)
CHOLEST/HDLC SERPL: 2.9 {RATIO} (ref 2–5)
HDLC SERPL-MCNC: 35 MG/DL (ref 40–75)
HDLC SERPL: 35 % (ref 20–50)
LDLC SERPL CALC-MCNC: 52.8 MG/DL (ref 63–159)
NONHDLC SERPL-MCNC: 65 MG/DL
TRIGL SERPL-MCNC: 61 MG/DL (ref 30–150)

## 2021-07-16 ENCOUNTER — OFFICE VISIT (OUTPATIENT)
Dept: PODIATRY | Facility: CLINIC | Age: 51
End: 2021-07-16
Payer: MEDICAID

## 2021-07-16 VITALS
HEART RATE: 91 BPM | DIASTOLIC BLOOD PRESSURE: 80 MMHG | SYSTOLIC BLOOD PRESSURE: 130 MMHG | WEIGHT: 235 LBS | BODY MASS INDEX: 31.14 KG/M2 | HEIGHT: 73 IN

## 2021-07-16 DIAGNOSIS — E11.42 TYPE 2 DIABETES MELLITUS WITH DIABETIC POLYNEUROPATHY, UNSPECIFIED WHETHER LONG TERM INSULIN USE: ICD-10-CM

## 2021-07-16 DIAGNOSIS — L97.523 ULCER OF LEFT FOOT WITH NECROSIS OF MUSCLE: ICD-10-CM

## 2021-07-16 DIAGNOSIS — M86.9 OSTEOMYELITIS, UNSPECIFIED SITE, UNSPECIFIED TYPE: Primary | ICD-10-CM

## 2021-07-16 PROCEDURE — 99999 PR PBB SHADOW E&M-EST. PATIENT-LVL III: ICD-10-PCS | Mod: PBBFAC,,, | Performed by: PODIATRIST

## 2021-07-16 PROCEDURE — 11042 WOUND DEBRIDEMENT: ICD-10-PCS | Mod: 79,S$PBB,, | Performed by: PODIATRIST

## 2021-07-16 PROCEDURE — 99999 PR PBB SHADOW E&M-EST. PATIENT-LVL III: CPT | Mod: PBBFAC,,, | Performed by: PODIATRIST

## 2021-07-16 PROCEDURE — 99499 UNLISTED E&M SERVICE: CPT | Mod: S$PBB,,, | Performed by: PODIATRIST

## 2021-07-16 PROCEDURE — 99213 OFFICE O/P EST LOW 20 MIN: CPT | Mod: PBBFAC,PN,25 | Performed by: PODIATRIST

## 2021-07-16 PROCEDURE — 11042 DBRDMT SUBQ TIS 1ST 20SQCM/<: CPT | Mod: PBBFAC,PN | Performed by: PODIATRIST

## 2021-07-16 PROCEDURE — 99499 NO LOS: ICD-10-PCS | Mod: S$PBB,,, | Performed by: PODIATRIST

## 2021-07-19 ENCOUNTER — TELEPHONE (OUTPATIENT)
Dept: PODIATRY | Facility: CLINIC | Age: 51
End: 2021-07-19

## 2021-07-19 RX ORDER — METFORMIN HYDROCHLORIDE 500 MG/1
TABLET ORAL
Qty: 360 TABLET | Refills: 3 | Status: SHIPPED | OUTPATIENT
Start: 2021-07-19 | End: 2022-06-06

## 2021-07-20 ENCOUNTER — OFFICE VISIT (OUTPATIENT)
Dept: PODIATRY | Facility: CLINIC | Age: 51
End: 2021-07-20
Payer: MEDICAID

## 2021-07-20 ENCOUNTER — TELEPHONE (OUTPATIENT)
Dept: PODIATRY | Facility: CLINIC | Age: 51
End: 2021-07-20

## 2021-07-20 VITALS
WEIGHT: 235 LBS | DIASTOLIC BLOOD PRESSURE: 77 MMHG | SYSTOLIC BLOOD PRESSURE: 130 MMHG | TEMPERATURE: 98 F | RESPIRATION RATE: 18 BRPM | HEIGHT: 73 IN | HEART RATE: 93 BPM | BODY MASS INDEX: 31.14 KG/M2

## 2021-07-20 DIAGNOSIS — E11.42 TYPE 2 DIABETES MELLITUS WITH DIABETIC POLYNEUROPATHY, UNSPECIFIED WHETHER LONG TERM INSULIN USE: Primary | ICD-10-CM

## 2021-07-20 DIAGNOSIS — L97.523 ULCER OF LEFT FOOT WITH NECROSIS OF MUSCLE: ICD-10-CM

## 2021-07-20 DIAGNOSIS — M86.9 OSTEOMYELITIS, UNSPECIFIED SITE, UNSPECIFIED TYPE: ICD-10-CM

## 2021-07-20 PROCEDURE — 11043 DBRDMT MUSC&/FSCA 1ST 20/<: CPT | Mod: PBBFAC | Performed by: PODIATRIST

## 2021-07-20 PROCEDURE — 99024 POSTOP FOLLOW-UP VISIT: CPT | Mod: S$PBB,,, | Performed by: PODIATRIST

## 2021-07-20 PROCEDURE — 99024 PR POST-OP FOLLOW-UP VISIT: ICD-10-PCS | Mod: S$PBB,,, | Performed by: PODIATRIST

## 2021-07-20 PROCEDURE — 11042 DBRDMT SUBQ TIS 1ST 20SQCM/<: CPT | Mod: PBBFAC,59 | Performed by: PODIATRIST

## 2021-07-20 PROCEDURE — 99499 NO LOS: ICD-10-PCS | Mod: S$PBB,,, | Performed by: PODIATRIST

## 2021-07-20 PROCEDURE — 99214 OFFICE O/P EST MOD 30 MIN: CPT | Mod: PBBFAC,25 | Performed by: PODIATRIST

## 2021-07-20 PROCEDURE — 99499 UNLISTED E&M SERVICE: CPT | Mod: S$PBB,,, | Performed by: PODIATRIST

## 2021-07-20 PROCEDURE — 99999 PR PBB SHADOW E&M-EST. PATIENT-LVL IV: ICD-10-PCS | Mod: PBBFAC,,, | Performed by: PODIATRIST

## 2021-07-20 PROCEDURE — 99999 PR PBB SHADOW E&M-EST. PATIENT-LVL IV: CPT | Mod: PBBFAC,,, | Performed by: PODIATRIST

## 2021-07-22 DIAGNOSIS — Z12.11 SPECIAL SCREENING FOR MALIGNANT NEOPLASM OF COLON: Primary | ICD-10-CM

## 2021-07-23 ENCOUNTER — OFFICE VISIT (OUTPATIENT)
Dept: PODIATRY | Facility: CLINIC | Age: 51
End: 2021-07-23
Payer: MEDICAID

## 2021-07-23 ENCOUNTER — TELEPHONE (OUTPATIENT)
Dept: INTERNAL MEDICINE | Facility: CLINIC | Age: 51
End: 2021-07-23

## 2021-07-23 VITALS
HEART RATE: 92 BPM | HEIGHT: 73 IN | DIASTOLIC BLOOD PRESSURE: 80 MMHG | WEIGHT: 235 LBS | BODY MASS INDEX: 31.14 KG/M2 | SYSTOLIC BLOOD PRESSURE: 143 MMHG

## 2021-07-23 DIAGNOSIS — R60.0 EDEMA OF LEFT LOWER LEG: ICD-10-CM

## 2021-07-23 DIAGNOSIS — L97.523 ULCER OF LEFT FOOT WITH NECROSIS OF MUSCLE: Primary | ICD-10-CM

## 2021-07-23 DIAGNOSIS — E11.42 TYPE 2 DIABETES MELLITUS WITH DIABETIC POLYNEUROPATHY, UNSPECIFIED WHETHER LONG TERM INSULIN USE: ICD-10-CM

## 2021-07-23 DIAGNOSIS — M86.9 OSTEOMYELITIS, UNSPECIFIED SITE, UNSPECIFIED TYPE: ICD-10-CM

## 2021-07-23 PROCEDURE — 99999 PR PBB SHADOW E&M-EST. PATIENT-LVL IV: ICD-10-PCS | Mod: PBBFAC,,, | Performed by: PODIATRIST

## 2021-07-23 PROCEDURE — 99024 POSTOP FOLLOW-UP VISIT: CPT | Mod: S$PBB,,, | Performed by: PODIATRIST

## 2021-07-23 PROCEDURE — 99214 OFFICE O/P EST MOD 30 MIN: CPT | Mod: PBBFAC,PN | Performed by: PODIATRIST

## 2021-07-23 PROCEDURE — 99024 PR POST-OP FOLLOW-UP VISIT: ICD-10-PCS | Mod: S$PBB,,, | Performed by: PODIATRIST

## 2021-07-23 PROCEDURE — 99999 PR PBB SHADOW E&M-EST. PATIENT-LVL IV: CPT | Mod: PBBFAC,,, | Performed by: PODIATRIST

## 2021-07-27 ENCOUNTER — OFFICE VISIT (OUTPATIENT)
Dept: PODIATRY | Facility: CLINIC | Age: 51
End: 2021-07-27
Payer: MEDICAID

## 2021-07-27 VITALS
WEIGHT: 235 LBS | DIASTOLIC BLOOD PRESSURE: 93 MMHG | HEART RATE: 91 BPM | SYSTOLIC BLOOD PRESSURE: 158 MMHG | BODY MASS INDEX: 31.01 KG/M2

## 2021-07-27 DIAGNOSIS — L97.523 ULCER OF LEFT FOOT WITH NECROSIS OF MUSCLE: ICD-10-CM

## 2021-07-27 DIAGNOSIS — E11.42 TYPE 2 DIABETES MELLITUS WITH DIABETIC POLYNEUROPATHY, UNSPECIFIED WHETHER LONG TERM INSULIN USE: Primary | ICD-10-CM

## 2021-07-27 DIAGNOSIS — M86.9 OSTEOMYELITIS, UNSPECIFIED SITE, UNSPECIFIED TYPE: ICD-10-CM

## 2021-07-27 PROCEDURE — 99999 PR PBB SHADOW E&M-EST. PATIENT-LVL III: ICD-10-PCS | Mod: PBBFAC,,, | Performed by: PODIATRIST

## 2021-07-27 PROCEDURE — 99024 PR POST-OP FOLLOW-UP VISIT: ICD-10-PCS | Mod: S$PBB,,, | Performed by: PODIATRIST

## 2021-07-27 PROCEDURE — 99024 POSTOP FOLLOW-UP VISIT: CPT | Mod: S$PBB,,, | Performed by: PODIATRIST

## 2021-07-27 PROCEDURE — 99213 OFFICE O/P EST LOW 20 MIN: CPT | Mod: PBBFAC | Performed by: PODIATRIST

## 2021-07-27 PROCEDURE — 99999 PR PBB SHADOW E&M-EST. PATIENT-LVL III: CPT | Mod: PBBFAC,,, | Performed by: PODIATRIST

## 2021-07-29 ENCOUNTER — OFFICE VISIT (OUTPATIENT)
Dept: PODIATRY | Facility: CLINIC | Age: 51
End: 2021-07-29
Payer: MEDICAID

## 2021-07-29 VITALS
SYSTOLIC BLOOD PRESSURE: 138 MMHG | WEIGHT: 223.56 LBS | HEIGHT: 73 IN | BODY MASS INDEX: 29.63 KG/M2 | HEART RATE: 93 BPM | DIASTOLIC BLOOD PRESSURE: 85 MMHG

## 2021-07-29 DIAGNOSIS — E11.42 TYPE 2 DIABETES MELLITUS WITH DIABETIC POLYNEUROPATHY, UNSPECIFIED WHETHER LONG TERM INSULIN USE: Primary | ICD-10-CM

## 2021-07-29 DIAGNOSIS — L97.523 ULCER OF LEFT FOOT WITH NECROSIS OF MUSCLE: ICD-10-CM

## 2021-07-29 DIAGNOSIS — M86.9 OSTEOMYELITIS, UNSPECIFIED SITE, UNSPECIFIED TYPE: ICD-10-CM

## 2021-07-29 PROCEDURE — 99213 PR OFFICE/OUTPT VISIT, EST, LEVL III, 20-29 MIN: ICD-10-PCS | Mod: S$PBB,,, | Performed by: PODIATRIST

## 2021-07-29 PROCEDURE — 99213 OFFICE O/P EST LOW 20 MIN: CPT | Mod: PBBFAC | Performed by: PODIATRIST

## 2021-07-29 PROCEDURE — 99999 PR PBB SHADOW E&M-EST. PATIENT-LVL III: ICD-10-PCS | Mod: PBBFAC,,, | Performed by: PODIATRIST

## 2021-07-29 PROCEDURE — 99999 PR PBB SHADOW E&M-EST. PATIENT-LVL III: CPT | Mod: PBBFAC,,, | Performed by: PODIATRIST

## 2021-07-29 PROCEDURE — 99213 OFFICE O/P EST LOW 20 MIN: CPT | Mod: S$PBB,,, | Performed by: PODIATRIST

## 2021-08-02 ENCOUNTER — OFFICE VISIT (OUTPATIENT)
Dept: PODIATRY | Facility: CLINIC | Age: 51
End: 2021-08-02
Payer: MEDICAID

## 2021-08-02 VITALS
WEIGHT: 220 LBS | HEART RATE: 90 BPM | HEIGHT: 73 IN | SYSTOLIC BLOOD PRESSURE: 156 MMHG | DIASTOLIC BLOOD PRESSURE: 94 MMHG | BODY MASS INDEX: 29.16 KG/M2 | TEMPERATURE: 98 F | RESPIRATION RATE: 18 BRPM

## 2021-08-02 DIAGNOSIS — M86.9 OSTEOMYELITIS, UNSPECIFIED SITE, UNSPECIFIED TYPE: ICD-10-CM

## 2021-08-02 DIAGNOSIS — R60.0 EDEMA OF LEFT LOWER LEG: ICD-10-CM

## 2021-08-02 DIAGNOSIS — L97.523 ULCER OF LEFT FOOT WITH NECROSIS OF MUSCLE: ICD-10-CM

## 2021-08-02 DIAGNOSIS — E11.42 TYPE 2 DIABETES MELLITUS WITH DIABETIC POLYNEUROPATHY, UNSPECIFIED WHETHER LONG TERM INSULIN USE: Primary | ICD-10-CM

## 2021-08-02 PROCEDURE — 99213 PR OFFICE/OUTPT VISIT, EST, LEVL III, 20-29 MIN: ICD-10-PCS | Mod: S$PBB,,, | Performed by: PODIATRIST

## 2021-08-02 PROCEDURE — 99999 PR PBB SHADOW E&M-EST. PATIENT-LVL IV: CPT | Mod: PBBFAC,,, | Performed by: PODIATRIST

## 2021-08-02 PROCEDURE — 99214 OFFICE O/P EST MOD 30 MIN: CPT | Mod: PBBFAC | Performed by: PODIATRIST

## 2021-08-02 PROCEDURE — 99213 OFFICE O/P EST LOW 20 MIN: CPT | Mod: S$PBB,,, | Performed by: PODIATRIST

## 2021-08-02 PROCEDURE — 99999 PR PBB SHADOW E&M-EST. PATIENT-LVL IV: ICD-10-PCS | Mod: PBBFAC,,, | Performed by: PODIATRIST

## 2021-08-05 ENCOUNTER — OFFICE VISIT (OUTPATIENT)
Dept: PODIATRY | Facility: CLINIC | Age: 51
End: 2021-08-05
Payer: MEDICAID

## 2021-08-05 VITALS
WEIGHT: 220 LBS | HEART RATE: 96 BPM | DIASTOLIC BLOOD PRESSURE: 86 MMHG | BODY MASS INDEX: 29.03 KG/M2 | SYSTOLIC BLOOD PRESSURE: 128 MMHG | TEMPERATURE: 98 F

## 2021-08-05 DIAGNOSIS — L97.523 ULCER OF LEFT FOOT WITH NECROSIS OF MUSCLE: ICD-10-CM

## 2021-08-05 DIAGNOSIS — M86.9 OSTEOMYELITIS, UNSPECIFIED SITE, UNSPECIFIED TYPE: ICD-10-CM

## 2021-08-05 DIAGNOSIS — E11.42 TYPE 2 DIABETES MELLITUS WITH DIABETIC POLYNEUROPATHY, UNSPECIFIED WHETHER LONG TERM INSULIN USE: Primary | ICD-10-CM

## 2021-08-05 DIAGNOSIS — R60.0 EDEMA OF LEFT LOWER LEG: ICD-10-CM

## 2021-08-05 PROCEDURE — 99999 PR PBB SHADOW E&M-EST. PATIENT-LVL III: CPT | Mod: PBBFAC,,, | Performed by: PODIATRIST

## 2021-08-05 PROCEDURE — 99999 PR PBB SHADOW E&M-EST. PATIENT-LVL III: ICD-10-PCS | Mod: PBBFAC,,, | Performed by: PODIATRIST

## 2021-08-05 PROCEDURE — 99213 OFFICE O/P EST LOW 20 MIN: CPT | Mod: S$PBB,,, | Performed by: PODIATRIST

## 2021-08-05 PROCEDURE — 99213 OFFICE O/P EST LOW 20 MIN: CPT | Mod: PBBFAC | Performed by: PODIATRIST

## 2021-08-05 PROCEDURE — 99213 PR OFFICE/OUTPT VISIT, EST, LEVL III, 20-29 MIN: ICD-10-PCS | Mod: S$PBB,,, | Performed by: PODIATRIST

## 2021-08-09 ENCOUNTER — OFFICE VISIT (OUTPATIENT)
Dept: PODIATRY | Facility: CLINIC | Age: 51
End: 2021-08-09
Payer: MEDICAID

## 2021-08-09 VITALS
HEIGHT: 73 IN | BODY MASS INDEX: 29.16 KG/M2 | SYSTOLIC BLOOD PRESSURE: 155 MMHG | HEART RATE: 94 BPM | WEIGHT: 220 LBS | DIASTOLIC BLOOD PRESSURE: 96 MMHG

## 2021-08-09 DIAGNOSIS — L97.523 ULCER OF LEFT FOOT WITH NECROSIS OF MUSCLE: ICD-10-CM

## 2021-08-09 DIAGNOSIS — E11.42 TYPE 2 DIABETES MELLITUS WITH DIABETIC POLYNEUROPATHY, UNSPECIFIED WHETHER LONG TERM INSULIN USE: Primary | ICD-10-CM

## 2021-08-09 PROCEDURE — 99999 PR PBB SHADOW E&M-EST. PATIENT-LVL IV: ICD-10-PCS | Mod: PBBFAC,,, | Performed by: PODIATRIST

## 2021-08-09 PROCEDURE — 99999 PR PBB SHADOW E&M-EST. PATIENT-LVL IV: CPT | Mod: PBBFAC,,, | Performed by: PODIATRIST

## 2021-08-09 PROCEDURE — 99213 PR OFFICE/OUTPT VISIT, EST, LEVL III, 20-29 MIN: ICD-10-PCS | Mod: S$PBB,,, | Performed by: PODIATRIST

## 2021-08-09 PROCEDURE — 99214 OFFICE O/P EST MOD 30 MIN: CPT | Mod: PBBFAC | Performed by: PODIATRIST

## 2021-08-09 PROCEDURE — 99213 OFFICE O/P EST LOW 20 MIN: CPT | Mod: S$PBB,,, | Performed by: PODIATRIST

## 2021-08-12 ENCOUNTER — OFFICE VISIT (OUTPATIENT)
Dept: PODIATRY | Facility: CLINIC | Age: 51
End: 2021-08-12
Payer: MEDICAID

## 2021-08-12 VITALS
WEIGHT: 220 LBS | HEART RATE: 98 BPM | HEIGHT: 73 IN | SYSTOLIC BLOOD PRESSURE: 164 MMHG | BODY MASS INDEX: 29.16 KG/M2 | DIASTOLIC BLOOD PRESSURE: 96 MMHG

## 2021-08-12 DIAGNOSIS — E11.42 TYPE 2 DIABETES MELLITUS WITH DIABETIC POLYNEUROPATHY, UNSPECIFIED WHETHER LONG TERM INSULIN USE: Primary | ICD-10-CM

## 2021-08-12 DIAGNOSIS — L97.523 ULCER OF LEFT FOOT WITH NECROSIS OF MUSCLE: ICD-10-CM

## 2021-08-12 DIAGNOSIS — M86.9 OSTEOMYELITIS, UNSPECIFIED SITE, UNSPECIFIED TYPE: ICD-10-CM

## 2021-08-12 PROCEDURE — 99214 OFFICE O/P EST MOD 30 MIN: CPT | Mod: PBBFAC | Performed by: PODIATRIST

## 2021-08-12 PROCEDURE — 99213 PR OFFICE/OUTPT VISIT, EST, LEVL III, 20-29 MIN: ICD-10-PCS | Mod: S$PBB,,, | Performed by: PODIATRIST

## 2021-08-12 PROCEDURE — 99999 PR PBB SHADOW E&M-EST. PATIENT-LVL IV: CPT | Mod: PBBFAC,,, | Performed by: PODIATRIST

## 2021-08-12 PROCEDURE — 99999 PR PBB SHADOW E&M-EST. PATIENT-LVL IV: ICD-10-PCS | Mod: PBBFAC,,, | Performed by: PODIATRIST

## 2021-08-12 PROCEDURE — 99213 OFFICE O/P EST LOW 20 MIN: CPT | Mod: S$PBB,,, | Performed by: PODIATRIST

## 2021-08-14 DIAGNOSIS — Z12.11 SPECIAL SCREENING FOR MALIGNANT NEOPLASM OF COLON: Primary | ICD-10-CM

## 2021-08-16 ENCOUNTER — OFFICE VISIT (OUTPATIENT)
Dept: PODIATRY | Facility: CLINIC | Age: 51
End: 2021-08-16
Payer: MEDICAID

## 2021-08-16 VITALS
BODY MASS INDEX: 29.03 KG/M2 | SYSTOLIC BLOOD PRESSURE: 143 MMHG | WEIGHT: 220 LBS | HEART RATE: 100 BPM | DIASTOLIC BLOOD PRESSURE: 92 MMHG

## 2021-08-16 DIAGNOSIS — L97.523 ULCER OF LEFT FOOT WITH NECROSIS OF MUSCLE: ICD-10-CM

## 2021-08-16 DIAGNOSIS — E11.42 TYPE 2 DIABETES MELLITUS WITH DIABETIC POLYNEUROPATHY, UNSPECIFIED WHETHER LONG TERM INSULIN USE: Primary | ICD-10-CM

## 2021-08-16 PROCEDURE — 99213 OFFICE O/P EST LOW 20 MIN: CPT | Mod: PBBFAC | Performed by: PODIATRIST

## 2021-08-16 PROCEDURE — 87070 CULTURE OTHR SPECIMN AEROBIC: CPT | Performed by: PODIATRIST

## 2021-08-16 PROCEDURE — 99999 PR PBB SHADOW E&M-EST. PATIENT-LVL III: CPT | Mod: PBBFAC,,, | Performed by: PODIATRIST

## 2021-08-16 PROCEDURE — 99213 OFFICE O/P EST LOW 20 MIN: CPT | Mod: S$PBB,,, | Performed by: PODIATRIST

## 2021-08-16 PROCEDURE — 87076 CULTURE ANAEROBE IDENT EACH: CPT | Performed by: PODIATRIST

## 2021-08-16 PROCEDURE — 99213 PR OFFICE/OUTPT VISIT, EST, LEVL III, 20-29 MIN: ICD-10-PCS | Mod: S$PBB,,, | Performed by: PODIATRIST

## 2021-08-16 PROCEDURE — 99999 PR PBB SHADOW E&M-EST. PATIENT-LVL III: ICD-10-PCS | Mod: PBBFAC,,, | Performed by: PODIATRIST

## 2021-08-16 PROCEDURE — 87075 CULTR BACTERIA EXCEPT BLOOD: CPT | Performed by: PODIATRIST

## 2021-08-16 PROCEDURE — 87186 SC STD MICRODIL/AGAR DIL: CPT | Performed by: PODIATRIST

## 2021-08-16 PROCEDURE — 87077 CULTURE AEROBIC IDENTIFY: CPT | Performed by: PODIATRIST

## 2021-08-17 NOTE — PLAN OF CARE
2nd attempt to call pt  There was no , VM left to call clinic back   Problem: Physical Therapy Goal  Goal: Physical Therapy Goal  Goals to be met by: 2/3/17     Patient will increase functional independence with mobility by performin. Sit to stand transfer with Modified [place] w axillary crutches.  2. Amb >125 with axillary crutches maintaining RLE NWB status with SVN/modified independence.  3. Negotiate up/down 4 steps with ax crutches RLE NWB with CGA/SBA    Ongoing assessment of discharge recs. Most likely home with family support.  Outcome: Ongoing (interventions implemented as appropriate)  Ongoing assessment for discharge recommendations.  Currently pt requires CGA/min assist for safety with balance during ambulation NWB RLE w axillary crutches but with excellent potential to achieve mod independent over course of 1-2 additional gait training sessions.

## 2021-08-19 ENCOUNTER — OFFICE VISIT (OUTPATIENT)
Dept: PODIATRY | Facility: CLINIC | Age: 51
End: 2021-08-19
Payer: MEDICAID

## 2021-08-19 VITALS
HEART RATE: 91 BPM | SYSTOLIC BLOOD PRESSURE: 143 MMHG | BODY MASS INDEX: 29.16 KG/M2 | HEIGHT: 73 IN | DIASTOLIC BLOOD PRESSURE: 85 MMHG | RESPIRATION RATE: 18 BRPM | WEIGHT: 220 LBS

## 2021-08-19 DIAGNOSIS — L97.523 ULCER OF LEFT FOOT WITH NECROSIS OF MUSCLE: ICD-10-CM

## 2021-08-19 DIAGNOSIS — E11.42 TYPE 2 DIABETES MELLITUS WITH DIABETIC POLYNEUROPATHY, UNSPECIFIED WHETHER LONG TERM INSULIN USE: Primary | ICD-10-CM

## 2021-08-19 LAB — BACTERIA SPEC AEROBE CULT: ABNORMAL

## 2021-08-19 PROCEDURE — 99213 OFFICE O/P EST LOW 20 MIN: CPT | Mod: S$PBB,,, | Performed by: PODIATRIST

## 2021-08-19 PROCEDURE — 99213 PR OFFICE/OUTPT VISIT, EST, LEVL III, 20-29 MIN: ICD-10-PCS | Mod: S$PBB,,, | Performed by: PODIATRIST

## 2021-08-19 PROCEDURE — 99213 OFFICE O/P EST LOW 20 MIN: CPT | Mod: PBBFAC | Performed by: PODIATRIST

## 2021-08-19 PROCEDURE — 99999 PR PBB SHADOW E&M-EST. PATIENT-LVL III: CPT | Mod: PBBFAC,,, | Performed by: PODIATRIST

## 2021-08-19 PROCEDURE — 99999 PR PBB SHADOW E&M-EST. PATIENT-LVL III: ICD-10-PCS | Mod: PBBFAC,,, | Performed by: PODIATRIST

## 2021-08-19 RX ORDER — CIPROFLOXACIN 500 MG/1
500 TABLET ORAL
COMMUNITY
End: 2021-09-01

## 2021-08-20 ENCOUNTER — TELEPHONE (OUTPATIENT)
Dept: PODIATRY | Facility: CLINIC | Age: 51
End: 2021-08-20

## 2021-08-23 ENCOUNTER — OFFICE VISIT (OUTPATIENT)
Dept: PODIATRY | Facility: CLINIC | Age: 51
End: 2021-08-23
Payer: MEDICAID

## 2021-08-23 VITALS
BODY MASS INDEX: 29.03 KG/M2 | HEART RATE: 80 BPM | WEIGHT: 220 LBS | DIASTOLIC BLOOD PRESSURE: 83 MMHG | SYSTOLIC BLOOD PRESSURE: 148 MMHG

## 2021-08-23 DIAGNOSIS — E11.42 TYPE 2 DIABETES MELLITUS WITH DIABETIC POLYNEUROPATHY, UNSPECIFIED WHETHER LONG TERM INSULIN USE: Primary | ICD-10-CM

## 2021-08-23 DIAGNOSIS — L97.523 ULCER OF LEFT FOOT WITH NECROSIS OF MUSCLE: ICD-10-CM

## 2021-08-23 LAB — BACTERIA SPEC ANAEROBE CULT: NORMAL

## 2021-08-23 PROCEDURE — 99214 OFFICE O/P EST MOD 30 MIN: CPT | Mod: PBBFAC | Performed by: PODIATRIST

## 2021-08-23 PROCEDURE — 99213 PR OFFICE/OUTPT VISIT, EST, LEVL III, 20-29 MIN: ICD-10-PCS | Mod: S$PBB,,, | Performed by: PODIATRIST

## 2021-08-23 PROCEDURE — 99213 OFFICE O/P EST LOW 20 MIN: CPT | Mod: S$PBB,,, | Performed by: PODIATRIST

## 2021-08-23 PROCEDURE — 99999 PR PBB SHADOW E&M-EST. PATIENT-LVL IV: ICD-10-PCS | Mod: PBBFAC,,, | Performed by: PODIATRIST

## 2021-08-23 PROCEDURE — 99999 PR PBB SHADOW E&M-EST. PATIENT-LVL IV: CPT | Mod: PBBFAC,,, | Performed by: PODIATRIST

## 2021-08-26 ENCOUNTER — OFFICE VISIT (OUTPATIENT)
Dept: INTERNAL MEDICINE | Facility: CLINIC | Age: 51
End: 2021-08-26
Payer: MEDICAID

## 2021-08-26 ENCOUNTER — OFFICE VISIT (OUTPATIENT)
Dept: PODIATRY | Facility: CLINIC | Age: 51
End: 2021-08-26
Payer: MEDICAID

## 2021-08-26 ENCOUNTER — TELEPHONE (OUTPATIENT)
Dept: INTERNAL MEDICINE | Facility: CLINIC | Age: 51
End: 2021-08-26

## 2021-08-26 VITALS
WEIGHT: 220 LBS | SYSTOLIC BLOOD PRESSURE: 124 MMHG | BODY MASS INDEX: 29.16 KG/M2 | HEART RATE: 97 BPM | HEIGHT: 73 IN | DIASTOLIC BLOOD PRESSURE: 76 MMHG

## 2021-08-26 VITALS
SYSTOLIC BLOOD PRESSURE: 130 MMHG | WEIGHT: 224.44 LBS | OXYGEN SATURATION: 99 % | BODY MASS INDEX: 29.74 KG/M2 | HEART RATE: 100 BPM | HEIGHT: 73 IN | DIASTOLIC BLOOD PRESSURE: 58 MMHG

## 2021-08-26 DIAGNOSIS — M86.10 ACUTE ON CHRONIC OSTEOMYELITIS: ICD-10-CM

## 2021-08-26 DIAGNOSIS — E78.2 MIXED HYPERLIPIDEMIA: ICD-10-CM

## 2021-08-26 DIAGNOSIS — E11.42 TYPE 2 DIABETES MELLITUS WITH DIABETIC POLYNEUROPATHY, UNSPECIFIED WHETHER LONG TERM INSULIN USE: Primary | ICD-10-CM

## 2021-08-26 DIAGNOSIS — I10 ESSENTIAL HYPERTENSION: ICD-10-CM

## 2021-08-26 DIAGNOSIS — H35.033 HYPERTENSIVE RETINOPATHY, BILATERAL: ICD-10-CM

## 2021-08-26 DIAGNOSIS — M86.60 ACUTE ON CHRONIC OSTEOMYELITIS: ICD-10-CM

## 2021-08-26 DIAGNOSIS — L97.523 ULCER OF LEFT FOOT WITH NECROSIS OF MUSCLE: ICD-10-CM

## 2021-08-26 DIAGNOSIS — S98.131S: ICD-10-CM

## 2021-08-26 DIAGNOSIS — L97.421 DIABETIC ULCER OF LEFT HEEL ASSOCIATED WITH DIABETES MELLITUS DUE TO UNDERLYING CONDITION, LIMITED TO BREAKDOWN OF SKIN: ICD-10-CM

## 2021-08-26 DIAGNOSIS — E11.65 TYPE 2 DIABETES MELLITUS WITH HYPERGLYCEMIA, WITH LONG-TERM CURRENT USE OF INSULIN: Primary | ICD-10-CM

## 2021-08-26 DIAGNOSIS — Z79.4 TYPE 2 DIABETES MELLITUS WITH HYPERGLYCEMIA, WITH LONG-TERM CURRENT USE OF INSULIN: Primary | ICD-10-CM

## 2021-08-26 DIAGNOSIS — E08.621 DIABETIC ULCER OF LEFT HEEL ASSOCIATED WITH DIABETES MELLITUS DUE TO UNDERLYING CONDITION, LIMITED TO BREAKDOWN OF SKIN: ICD-10-CM

## 2021-08-26 PROBLEM — U07.1 COVID-19 VIRUS DETECTED: Status: RESOLVED | Noted: 2020-03-27 | Resolved: 2021-08-26

## 2021-08-26 PROBLEM — M86.272 SUBACUTE OSTEOMYELITIS OF LEFT FOOT: Status: RESOLVED | Noted: 2020-03-11 | Resolved: 2021-08-26

## 2021-08-26 PROBLEM — U07.1 COVID-19 VIRUS INFECTION: Status: RESOLVED | Noted: 2020-03-15 | Resolved: 2021-08-26

## 2021-08-26 PROBLEM — D72.829 LEUKOCYTOSIS: Status: RESOLVED | Noted: 2019-07-12 | Resolved: 2021-08-26

## 2021-08-26 PROCEDURE — 11043 PR DEBRIDEMENT, SKIN, SUB-Q TISSUE,MUSCLE,=<20 SQ CM: ICD-10-PCS | Mod: S$PBB,,, | Performed by: PODIATRIST

## 2021-08-26 PROCEDURE — 99214 OFFICE O/P EST MOD 30 MIN: CPT | Mod: PBBFAC,25 | Performed by: NURSE PRACTITIONER

## 2021-08-26 PROCEDURE — 99214 PR OFFICE/OUTPT VISIT, EST, LEVL IV, 30-39 MIN: ICD-10-PCS | Mod: S$PBB,,, | Performed by: NURSE PRACTITIONER

## 2021-08-26 PROCEDURE — 99999 PR PBB SHADOW E&M-EST. PATIENT-LVL IV: CPT | Mod: PBBFAC,,, | Performed by: NURSE PRACTITIONER

## 2021-08-26 PROCEDURE — 11043 DBRDMT MUSC&/FSCA 1ST 20/<: CPT | Mod: S$PBB,,, | Performed by: PODIATRIST

## 2021-08-26 PROCEDURE — 99999 PR PBB SHADOW E&M-EST. PATIENT-LVL IV: ICD-10-PCS | Mod: PBBFAC,,, | Performed by: NURSE PRACTITIONER

## 2021-08-26 PROCEDURE — 99999 PR PBB SHADOW E&M-EST. PATIENT-LVL III: CPT | Mod: PBBFAC,,, | Performed by: PODIATRIST

## 2021-08-26 PROCEDURE — 99213 OFFICE O/P EST LOW 20 MIN: CPT | Mod: PBBFAC,27,25 | Performed by: PODIATRIST

## 2021-08-26 PROCEDURE — 99214 OFFICE O/P EST MOD 30 MIN: CPT | Mod: S$PBB,,, | Performed by: NURSE PRACTITIONER

## 2021-08-26 PROCEDURE — 99213 PR OFFICE/OUTPT VISIT, EST, LEVL III, 20-29 MIN: ICD-10-PCS | Mod: 25,S$PBB,, | Performed by: PODIATRIST

## 2021-08-26 PROCEDURE — 99999 PR PBB SHADOW E&M-EST. PATIENT-LVL III: ICD-10-PCS | Mod: PBBFAC,,, | Performed by: PODIATRIST

## 2021-08-26 PROCEDURE — 11043 DBRDMT MUSC&/FSCA 1ST 20/<: CPT | Mod: PBBFAC,LT | Performed by: PODIATRIST

## 2021-08-26 PROCEDURE — 99213 OFFICE O/P EST LOW 20 MIN: CPT | Mod: 25,S$PBB,, | Performed by: PODIATRIST

## 2021-08-26 RX ORDER — PEN NEEDLE, DIABETIC 30 GX3/16"
NEEDLE, DISPOSABLE MISCELLANEOUS
Qty: 400 EACH | Refills: 3 | Status: SHIPPED | OUTPATIENT
Start: 2021-08-26

## 2021-08-26 RX ORDER — INSULIN ASPART 100 [IU]/ML
INJECTION, SOLUTION INTRAVENOUS; SUBCUTANEOUS
Qty: 15 ML | Refills: 6
Start: 2021-08-26 | End: 2023-10-24 | Stop reason: SDUPTHER

## 2021-08-26 RX ORDER — DULAGLUTIDE 1.5 MG/.5ML
1.5 INJECTION, SOLUTION SUBCUTANEOUS
Qty: 4 PEN | Refills: 5 | Status: ON HOLD | OUTPATIENT
Start: 2021-08-26 | End: 2022-04-28 | Stop reason: HOSPADM

## 2021-08-30 ENCOUNTER — PATIENT MESSAGE (OUTPATIENT)
Dept: PODIATRY | Facility: CLINIC | Age: 51
End: 2021-08-30

## 2021-09-01 ENCOUNTER — OFFICE VISIT (OUTPATIENT)
Dept: PODIATRY | Facility: CLINIC | Age: 51
End: 2021-09-01
Payer: MEDICAID

## 2021-09-01 VITALS — BODY MASS INDEX: 29.61 KG/M2 | HEIGHT: 73 IN

## 2021-09-01 DIAGNOSIS — M86.172 ACUTE OSTEOMYELITIS OF LEFT FOOT: ICD-10-CM

## 2021-09-01 DIAGNOSIS — E11.42 TYPE 2 DIABETES MELLITUS WITH DIABETIC POLYNEUROPATHY, UNSPECIFIED WHETHER LONG TERM INSULIN USE: ICD-10-CM

## 2021-09-01 DIAGNOSIS — L97.422 DIABETIC ULCER OF LEFT MIDFOOT ASSOCIATED WITH TYPE 2 DIABETES MELLITUS, WITH FAT LAYER EXPOSED: Primary | ICD-10-CM

## 2021-09-01 DIAGNOSIS — Z89.422 HISTORY OF PARTIAL RAY AMPUTATION OF FIFTH TOE OF LEFT FOOT: ICD-10-CM

## 2021-09-01 DIAGNOSIS — E11.621 DIABETIC ULCER OF LEFT MIDFOOT ASSOCIATED WITH TYPE 2 DIABETES MELLITUS, WITH FAT LAYER EXPOSED: Primary | ICD-10-CM

## 2021-09-01 PROCEDURE — 99999 PR PBB SHADOW E&M-EST. PATIENT-LVL III: CPT | Mod: PBBFAC,,, | Performed by: PODIATRIST

## 2021-09-01 PROCEDURE — 87186 SC STD MICRODIL/AGAR DIL: CPT | Mod: 59 | Performed by: PODIATRIST

## 2021-09-01 PROCEDURE — 29445 PR APPLY RIGID LEG CAST: ICD-10-PCS | Mod: 59,S$PBB,LT, | Performed by: PODIATRIST

## 2021-09-01 PROCEDURE — 87077 CULTURE AEROBIC IDENTIFY: CPT | Performed by: PODIATRIST

## 2021-09-01 PROCEDURE — 11042 PR DEBRIDEMENT, SKIN, SUB-Q TISSUE,=<20 SQ CM: ICD-10-PCS | Mod: S$PBB,,, | Performed by: PODIATRIST

## 2021-09-01 PROCEDURE — 29445 APPL RIGID TOT CNTC LEG CAST: CPT | Mod: 59,S$PBB,LT, | Performed by: PODIATRIST

## 2021-09-01 PROCEDURE — 99213 OFFICE O/P EST LOW 20 MIN: CPT | Mod: PBBFAC | Performed by: PODIATRIST

## 2021-09-01 PROCEDURE — 11042 DBRDMT SUBQ TIS 1ST 20SQCM/<: CPT | Mod: S$PBB,,, | Performed by: PODIATRIST

## 2021-09-01 PROCEDURE — 87070 CULTURE OTHR SPECIMN AEROBIC: CPT | Performed by: PODIATRIST

## 2021-09-01 PROCEDURE — 99214 OFFICE O/P EST MOD 30 MIN: CPT | Mod: 25,S$PBB,, | Performed by: PODIATRIST

## 2021-09-01 PROCEDURE — 99999 PR PBB SHADOW E&M-EST. PATIENT-LVL III: ICD-10-PCS | Mod: PBBFAC,,, | Performed by: PODIATRIST

## 2021-09-01 PROCEDURE — 11042 DBRDMT SUBQ TIS 1ST 20SQCM/<: CPT | Mod: PBBFAC | Performed by: PODIATRIST

## 2021-09-01 PROCEDURE — 29445 APPL RIGID TOT CNTC LEG CAST: CPT | Mod: 59,LT,PBBFAC | Performed by: PODIATRIST

## 2021-09-01 PROCEDURE — 99214 PR OFFICE/OUTPT VISIT, EST, LEVL IV, 30-39 MIN: ICD-10-PCS | Mod: 25,S$PBB,, | Performed by: PODIATRIST

## 2021-09-01 RX ORDER — SULFAMETHOXAZOLE AND TRIMETHOPRIM 800; 160 MG/1; MG/1
1 TABLET ORAL 2 TIMES DAILY
Qty: 14 TABLET | Refills: 0 | Status: SHIPPED | OUTPATIENT
Start: 2021-09-01 | End: 2021-09-08

## 2021-09-07 LAB
BACTERIA SPEC AEROBE CULT: ABNORMAL

## 2021-09-08 ENCOUNTER — OFFICE VISIT (OUTPATIENT)
Dept: PODIATRY | Facility: CLINIC | Age: 51
End: 2021-09-08
Payer: MEDICAID

## 2021-09-08 ENCOUNTER — PATIENT MESSAGE (OUTPATIENT)
Dept: PODIATRY | Facility: CLINIC | Age: 51
End: 2021-09-08

## 2021-09-08 VITALS — BODY MASS INDEX: 29.74 KG/M2 | WEIGHT: 224.44 LBS | HEIGHT: 73 IN

## 2021-09-08 DIAGNOSIS — L97.422 DIABETIC ULCER OF LEFT MIDFOOT ASSOCIATED WITH TYPE 2 DIABETES MELLITUS, WITH FAT LAYER EXPOSED: Primary | ICD-10-CM

## 2021-09-08 DIAGNOSIS — E11.42 TYPE 2 DIABETES MELLITUS WITH DIABETIC POLYNEUROPATHY, WITH LONG-TERM CURRENT USE OF INSULIN: ICD-10-CM

## 2021-09-08 DIAGNOSIS — Z79.4 TYPE 2 DIABETES MELLITUS WITH DIABETIC POLYNEUROPATHY, WITH LONG-TERM CURRENT USE OF INSULIN: ICD-10-CM

## 2021-09-08 DIAGNOSIS — M86.172 ACUTE OSTEOMYELITIS OF LEFT FOOT: ICD-10-CM

## 2021-09-08 DIAGNOSIS — Z89.422 HISTORY OF PARTIAL RAY AMPUTATION OF FIFTH TOE OF LEFT FOOT: ICD-10-CM

## 2021-09-08 DIAGNOSIS — E11.621 DIABETIC ULCER OF LEFT MIDFOOT ASSOCIATED WITH TYPE 2 DIABETES MELLITUS, WITH FAT LAYER EXPOSED: Primary | ICD-10-CM

## 2021-09-08 PROCEDURE — 29445 APPL RIGID TOT CNTC LEG CAST: CPT | Mod: PBBFAC,59 | Performed by: PODIATRIST

## 2021-09-08 PROCEDURE — 29445 APPL RIGID TOT CNTC LEG CAST: CPT | Mod: 59,S$PBB,LT, | Performed by: PODIATRIST

## 2021-09-08 PROCEDURE — 29445 PR APPLY RIGID LEG CAST: ICD-10-PCS | Mod: 59,S$PBB,LT, | Performed by: PODIATRIST

## 2021-09-08 PROCEDURE — 11042 PR DEBRIDEMENT, SKIN, SUB-Q TISSUE,=<20 SQ CM: ICD-10-PCS | Mod: S$PBB,,, | Performed by: PODIATRIST

## 2021-09-08 PROCEDURE — 99214 OFFICE O/P EST MOD 30 MIN: CPT | Mod: 25,S$PBB,, | Performed by: PODIATRIST

## 2021-09-08 PROCEDURE — 99214 PR OFFICE/OUTPT VISIT, EST, LEVL IV, 30-39 MIN: ICD-10-PCS | Mod: 25,S$PBB,, | Performed by: PODIATRIST

## 2021-09-08 PROCEDURE — 99999 PR PBB SHADOW E&M-EST. PATIENT-LVL III: CPT | Mod: PBBFAC,,, | Performed by: PODIATRIST

## 2021-09-08 PROCEDURE — 11042 DBRDMT SUBQ TIS 1ST 20SQCM/<: CPT | Mod: S$PBB,,, | Performed by: PODIATRIST

## 2021-09-08 PROCEDURE — 99213 OFFICE O/P EST LOW 20 MIN: CPT | Mod: PBBFAC,25 | Performed by: PODIATRIST

## 2021-09-08 PROCEDURE — 11042 DBRDMT SUBQ TIS 1ST 20SQCM/<: CPT | Mod: PBBFAC | Performed by: PODIATRIST

## 2021-09-08 PROCEDURE — 99999 PR PBB SHADOW E&M-EST. PATIENT-LVL III: ICD-10-PCS | Mod: PBBFAC,,, | Performed by: PODIATRIST

## 2021-09-09 ENCOUNTER — PATIENT MESSAGE (OUTPATIENT)
Dept: INFECTIOUS DISEASES | Facility: CLINIC | Age: 51
End: 2021-09-09

## 2021-09-09 ENCOUNTER — TELEPHONE (OUTPATIENT)
Dept: PODIATRY | Facility: CLINIC | Age: 51
End: 2021-09-09

## 2021-09-09 DIAGNOSIS — T14.8XXA WOUND INFECTION: Primary | ICD-10-CM

## 2021-09-09 DIAGNOSIS — L08.9 WOUND INFECTION: Primary | ICD-10-CM

## 2021-09-10 ENCOUNTER — TELEPHONE (OUTPATIENT)
Dept: INFECTIOUS DISEASES | Facility: CLINIC | Age: 51
End: 2021-09-10

## 2021-09-10 ENCOUNTER — TREATMENT PLANNING (OUTPATIENT)
Dept: INFECTIOUS DISEASES | Facility: HOSPITAL | Age: 51
End: 2021-09-10

## 2021-09-10 ENCOUNTER — PATIENT MESSAGE (OUTPATIENT)
Dept: PODIATRY | Facility: CLINIC | Age: 51
End: 2021-09-10

## 2021-09-10 ENCOUNTER — PATIENT MESSAGE (OUTPATIENT)
Dept: INFECTIOUS DISEASES | Facility: CLINIC | Age: 51
End: 2021-09-10

## 2021-09-10 ENCOUNTER — TELEPHONE (OUTPATIENT)
Dept: PODIATRY | Facility: CLINIC | Age: 51
End: 2021-09-10

## 2021-09-15 ENCOUNTER — OFFICE VISIT (OUTPATIENT)
Dept: PODIATRY | Facility: CLINIC | Age: 51
End: 2021-09-15
Payer: MEDICAID

## 2021-09-15 ENCOUNTER — PATIENT MESSAGE (OUTPATIENT)
Dept: PODIATRY | Facility: CLINIC | Age: 51
End: 2021-09-15

## 2021-09-15 ENCOUNTER — LAB VISIT (OUTPATIENT)
Dept: LAB | Facility: HOSPITAL | Age: 51
End: 2021-09-15
Attending: PODIATRIST
Payer: MEDICAID

## 2021-09-15 VITALS
HEART RATE: 94 BPM | HEIGHT: 73 IN | DIASTOLIC BLOOD PRESSURE: 104 MMHG | BODY MASS INDEX: 29.69 KG/M2 | WEIGHT: 224 LBS | RESPIRATION RATE: 18 BRPM | SYSTOLIC BLOOD PRESSURE: 164 MMHG

## 2021-09-15 DIAGNOSIS — E11.621 DIABETIC ULCER OF LEFT MIDFOOT ASSOCIATED WITH TYPE 2 DIABETES MELLITUS, WITH FAT LAYER EXPOSED: ICD-10-CM

## 2021-09-15 DIAGNOSIS — L97.422 DIABETIC ULCER OF LEFT MIDFOOT ASSOCIATED WITH TYPE 2 DIABETES MELLITUS, WITH FAT LAYER EXPOSED: Primary | ICD-10-CM

## 2021-09-15 DIAGNOSIS — E11.42 TYPE 2 DIABETES MELLITUS WITH DIABETIC POLYNEUROPATHY, WITH LONG-TERM CURRENT USE OF INSULIN: ICD-10-CM

## 2021-09-15 DIAGNOSIS — Z79.4 TYPE 2 DIABETES MELLITUS WITH DIABETIC POLYNEUROPATHY, WITH LONG-TERM CURRENT USE OF INSULIN: ICD-10-CM

## 2021-09-15 DIAGNOSIS — E11.621 DIABETIC ULCER OF LEFT MIDFOOT ASSOCIATED WITH TYPE 2 DIABETES MELLITUS, WITH FAT LAYER EXPOSED: Primary | ICD-10-CM

## 2021-09-15 DIAGNOSIS — L97.422 DIABETIC ULCER OF LEFT MIDFOOT ASSOCIATED WITH TYPE 2 DIABETES MELLITUS, WITH FAT LAYER EXPOSED: ICD-10-CM

## 2021-09-15 LAB
ALBUMIN SERPL BCP-MCNC: 3.7 G/DL (ref 3.5–5.2)
ALP SERPL-CCNC: 142 U/L (ref 55–135)
ALT SERPL W/O P-5'-P-CCNC: 19 U/L (ref 10–44)
ANION GAP SERPL CALC-SCNC: 11 MMOL/L (ref 8–16)
AST SERPL-CCNC: 18 U/L (ref 10–40)
BASOPHILS # BLD AUTO: 0.03 K/UL (ref 0–0.2)
BASOPHILS NFR BLD: 0.6 % (ref 0–1.9)
BILIRUB SERPL-MCNC: 0.7 MG/DL (ref 0.1–1)
BUN SERPL-MCNC: 11 MG/DL (ref 6–20)
CALCIUM SERPL-MCNC: 9.7 MG/DL (ref 8.7–10.5)
CHLORIDE SERPL-SCNC: 102 MMOL/L (ref 95–110)
CO2 SERPL-SCNC: 26 MMOL/L (ref 23–29)
CREAT SERPL-MCNC: 1 MG/DL (ref 0.5–1.4)
CRP SERPL-MCNC: 0.7 MG/L (ref 0–8.2)
DIFFERENTIAL METHOD: ABNORMAL
EOSINOPHIL # BLD AUTO: 0.1 K/UL (ref 0–0.5)
EOSINOPHIL NFR BLD: 1 % (ref 0–8)
ERYTHROCYTE [DISTWIDTH] IN BLOOD BY AUTOMATED COUNT: 15.7 % (ref 11.5–14.5)
ERYTHROCYTE [SEDIMENTATION RATE] IN BLOOD BY WESTERGREN METHOD: 64 MM/HR (ref 0–23)
EST. GFR  (AFRICAN AMERICAN): >60 ML/MIN/1.73 M^2
EST. GFR  (NON AFRICAN AMERICAN): >60 ML/MIN/1.73 M^2
ESTIMATED AVG GLUCOSE: 186 MG/DL (ref 68–131)
GLUCOSE SERPL-MCNC: 135 MG/DL (ref 70–110)
HBA1C MFR BLD: 8.1 % (ref 4–5.6)
HCT VFR BLD AUTO: 40.6 % (ref 40–54)
HGB BLD-MCNC: 12.4 G/DL (ref 14–18)
IMM GRANULOCYTES # BLD AUTO: 0.01 K/UL (ref 0–0.04)
IMM GRANULOCYTES NFR BLD AUTO: 0.2 % (ref 0–0.5)
LYMPHOCYTES # BLD AUTO: 2 K/UL (ref 1–4.8)
LYMPHOCYTES NFR BLD: 38.2 % (ref 18–48)
MCH RBC QN AUTO: 26.9 PG (ref 27–31)
MCHC RBC AUTO-ENTMCNC: 30.5 G/DL (ref 32–36)
MCV RBC AUTO: 88 FL (ref 82–98)
MONOCYTES # BLD AUTO: 0.3 K/UL (ref 0.3–1)
MONOCYTES NFR BLD: 6.2 % (ref 4–15)
NEUTROPHILS # BLD AUTO: 2.8 K/UL (ref 1.8–7.7)
NEUTROPHILS NFR BLD: 53.8 % (ref 38–73)
NRBC BLD-RTO: 0 /100 WBC
PLATELET # BLD AUTO: 249 K/UL (ref 150–450)
PMV BLD AUTO: 11 FL (ref 9.2–12.9)
POTASSIUM SERPL-SCNC: 3.9 MMOL/L (ref 3.5–5.1)
PROT SERPL-MCNC: 8 G/DL (ref 6–8.4)
RBC # BLD AUTO: 4.61 M/UL (ref 4.6–6.2)
SODIUM SERPL-SCNC: 139 MMOL/L (ref 136–145)
WBC # BLD AUTO: 5.16 K/UL (ref 3.9–12.7)

## 2021-09-15 PROCEDURE — 99214 PR OFFICE/OUTPT VISIT, EST, LEVL IV, 30-39 MIN: ICD-10-PCS | Mod: 25,S$PBB,, | Performed by: PODIATRIST

## 2021-09-15 PROCEDURE — 11042 DBRDMT SUBQ TIS 1ST 20SQCM/<: CPT | Mod: S$PBB,,, | Performed by: PODIATRIST

## 2021-09-15 PROCEDURE — 83036 HEMOGLOBIN GLYCOSYLATED A1C: CPT | Performed by: PODIATRIST

## 2021-09-15 PROCEDURE — 11042 PR DEBRIDEMENT, SKIN, SUB-Q TISSUE,=<20 SQ CM: ICD-10-PCS | Mod: S$PBB,,, | Performed by: PODIATRIST

## 2021-09-15 PROCEDURE — 86140 C-REACTIVE PROTEIN: CPT | Performed by: PODIATRIST

## 2021-09-15 PROCEDURE — 36415 COLL VENOUS BLD VENIPUNCTURE: CPT | Performed by: PODIATRIST

## 2021-09-15 PROCEDURE — 85652 RBC SED RATE AUTOMATED: CPT | Performed by: PODIATRIST

## 2021-09-15 PROCEDURE — 85025 COMPLETE CBC W/AUTO DIFF WBC: CPT | Performed by: PODIATRIST

## 2021-09-15 PROCEDURE — 11042 DBRDMT SUBQ TIS 1ST 20SQCM/<: CPT | Mod: PBBFAC | Performed by: PODIATRIST

## 2021-09-15 PROCEDURE — 99213 OFFICE O/P EST LOW 20 MIN: CPT | Mod: PBBFAC,25 | Performed by: PODIATRIST

## 2021-09-15 PROCEDURE — 99214 OFFICE O/P EST MOD 30 MIN: CPT | Mod: 25,S$PBB,, | Performed by: PODIATRIST

## 2021-09-15 PROCEDURE — 99999 PR PBB SHADOW E&M-EST. PATIENT-LVL III: ICD-10-PCS | Mod: PBBFAC,,, | Performed by: PODIATRIST

## 2021-09-15 PROCEDURE — 99999 PR PBB SHADOW E&M-EST. PATIENT-LVL III: CPT | Mod: PBBFAC,,, | Performed by: PODIATRIST

## 2021-09-15 PROCEDURE — 80053 COMPREHEN METABOLIC PANEL: CPT | Performed by: PODIATRIST

## 2021-09-16 ENCOUNTER — PATIENT MESSAGE (OUTPATIENT)
Dept: PODIATRY | Facility: CLINIC | Age: 51
End: 2021-09-16

## 2021-09-20 ENCOUNTER — OFFICE VISIT (OUTPATIENT)
Dept: INTERNAL MEDICINE | Facility: CLINIC | Age: 51
End: 2021-09-20
Payer: MEDICAID

## 2021-09-20 ENCOUNTER — OFFICE VISIT (OUTPATIENT)
Dept: PODIATRY | Facility: CLINIC | Age: 51
End: 2021-09-20
Payer: MEDICAID

## 2021-09-20 VITALS
HEIGHT: 73 IN | SYSTOLIC BLOOD PRESSURE: 155 MMHG | DIASTOLIC BLOOD PRESSURE: 95 MMHG | BODY MASS INDEX: 29.69 KG/M2 | RESPIRATION RATE: 18 BRPM | WEIGHT: 224 LBS | HEART RATE: 84 BPM

## 2021-09-20 VITALS
HEART RATE: 88 BPM | SYSTOLIC BLOOD PRESSURE: 152 MMHG | HEIGHT: 73 IN | WEIGHT: 227 LBS | DIASTOLIC BLOOD PRESSURE: 98 MMHG | BODY MASS INDEX: 30.09 KG/M2 | OXYGEN SATURATION: 98 %

## 2021-09-20 DIAGNOSIS — Z00.00 ANNUAL PHYSICAL EXAM: Primary | ICD-10-CM

## 2021-09-20 DIAGNOSIS — E11.42 TYPE 2 DIABETES MELLITUS WITH DIABETIC POLYNEUROPATHY, WITH LONG-TERM CURRENT USE OF INSULIN: ICD-10-CM

## 2021-09-20 DIAGNOSIS — I95.1 ORTHOSTATIC HYPOTENSION: ICD-10-CM

## 2021-09-20 DIAGNOSIS — L97.421 DIABETIC ULCER OF LEFT HEEL ASSOCIATED WITH DIABETES MELLITUS DUE TO UNDERLYING CONDITION, LIMITED TO BREAKDOWN OF SKIN: ICD-10-CM

## 2021-09-20 DIAGNOSIS — E08.621 DIABETIC ULCER OF LEFT HEEL ASSOCIATED WITH DIABETES MELLITUS DUE TO UNDERLYING CONDITION, LIMITED TO BREAKDOWN OF SKIN: ICD-10-CM

## 2021-09-20 DIAGNOSIS — E11.621 DIABETIC ULCER OF LEFT MIDFOOT ASSOCIATED WITH TYPE 2 DIABETES MELLITUS, WITH FAT LAYER EXPOSED: Primary | ICD-10-CM

## 2021-09-20 DIAGNOSIS — D64.9 ANEMIA, UNSPECIFIED TYPE: ICD-10-CM

## 2021-09-20 DIAGNOSIS — L97.422 DIABETIC ULCER OF LEFT MIDFOOT ASSOCIATED WITH TYPE 2 DIABETES MELLITUS, WITH FAT LAYER EXPOSED: Primary | ICD-10-CM

## 2021-09-20 DIAGNOSIS — Z79.4 TYPE 2 DIABETES MELLITUS WITH DIABETIC POLYNEUROPATHY, WITH LONG-TERM CURRENT USE OF INSULIN: ICD-10-CM

## 2021-09-20 DIAGNOSIS — E78.2 MIXED HYPERLIPIDEMIA: ICD-10-CM

## 2021-09-20 DIAGNOSIS — R06.00 DYSPNEA, UNSPECIFIED TYPE: ICD-10-CM

## 2021-09-20 PROCEDURE — 99999 PR PBB SHADOW E&M-EST. PATIENT-LVL III: ICD-10-PCS | Mod: PBBFAC,,, | Performed by: INTERNAL MEDICINE

## 2021-09-20 PROCEDURE — 99213 OFFICE O/P EST LOW 20 MIN: CPT | Mod: PBBFAC,27 | Performed by: INTERNAL MEDICINE

## 2021-09-20 PROCEDURE — 87077 CULTURE AEROBIC IDENTIFY: CPT | Performed by: PODIATRIST

## 2021-09-20 PROCEDURE — 99999 PR PBB SHADOW E&M-EST. PATIENT-LVL IV: ICD-10-PCS | Mod: PBBFAC,,, | Performed by: PODIATRIST

## 2021-09-20 PROCEDURE — 99214 PR OFFICE/OUTPT VISIT, EST, LEVL IV, 30-39 MIN: ICD-10-PCS | Mod: S$PBB,,, | Performed by: PODIATRIST

## 2021-09-20 PROCEDURE — 87075 CULTR BACTERIA EXCEPT BLOOD: CPT | Performed by: PODIATRIST

## 2021-09-20 PROCEDURE — 87070 CULTURE OTHR SPECIMN AEROBIC: CPT | Performed by: PODIATRIST

## 2021-09-20 PROCEDURE — 99999 PR PBB SHADOW E&M-EST. PATIENT-LVL III: CPT | Mod: PBBFAC,,, | Performed by: INTERNAL MEDICINE

## 2021-09-20 PROCEDURE — 99214 OFFICE O/P EST MOD 30 MIN: CPT | Mod: PBBFAC | Performed by: PODIATRIST

## 2021-09-20 PROCEDURE — 87186 SC STD MICRODIL/AGAR DIL: CPT | Performed by: PODIATRIST

## 2021-09-20 PROCEDURE — 99214 OFFICE O/P EST MOD 30 MIN: CPT | Mod: S$PBB,,, | Performed by: PODIATRIST

## 2021-09-20 PROCEDURE — 99396 PREV VISIT EST AGE 40-64: CPT | Mod: S$PBB,,, | Performed by: INTERNAL MEDICINE

## 2021-09-20 PROCEDURE — 99396 PR PREVENTIVE VISIT,EST,40-64: ICD-10-PCS | Mod: S$PBB,,, | Performed by: INTERNAL MEDICINE

## 2021-09-20 PROCEDURE — 99999 PR PBB SHADOW E&M-EST. PATIENT-LVL IV: CPT | Mod: PBBFAC,,, | Performed by: PODIATRIST

## 2021-09-23 ENCOUNTER — OFFICE VISIT (OUTPATIENT)
Dept: PODIATRY | Facility: CLINIC | Age: 51
End: 2021-09-23
Payer: MEDICAID

## 2021-09-23 ENCOUNTER — PATIENT MESSAGE (OUTPATIENT)
Dept: INTERNAL MEDICINE | Facility: CLINIC | Age: 51
End: 2021-09-23

## 2021-09-23 ENCOUNTER — TELEPHONE (OUTPATIENT)
Dept: PODIATRY | Facility: CLINIC | Age: 51
End: 2021-09-23

## 2021-09-23 VITALS
RESPIRATION RATE: 18 BRPM | DIASTOLIC BLOOD PRESSURE: 94 MMHG | WEIGHT: 227 LBS | BODY MASS INDEX: 30.09 KG/M2 | SYSTOLIC BLOOD PRESSURE: 197 MMHG | HEART RATE: 87 BPM | HEIGHT: 73 IN

## 2021-09-23 DIAGNOSIS — L97.422 DIABETIC ULCER OF LEFT MIDFOOT ASSOCIATED WITH TYPE 2 DIABETES MELLITUS, WITH FAT LAYER EXPOSED: Primary | ICD-10-CM

## 2021-09-23 DIAGNOSIS — Z12.11 SPECIAL SCREENING FOR MALIGNANT NEOPLASM OF COLON: Primary | ICD-10-CM

## 2021-09-23 DIAGNOSIS — Z89.422 HISTORY OF PARTIAL RAY AMPUTATION OF FIFTH TOE OF LEFT FOOT: ICD-10-CM

## 2021-09-23 DIAGNOSIS — E11.621 DIABETIC ULCER OF LEFT MIDFOOT ASSOCIATED WITH TYPE 2 DIABETES MELLITUS, WITH FAT LAYER EXPOSED: Primary | ICD-10-CM

## 2021-09-23 DIAGNOSIS — M86.172 ACUTE OSTEOMYELITIS OF LEFT FOOT: ICD-10-CM

## 2021-09-23 DIAGNOSIS — Z79.4 TYPE 2 DIABETES MELLITUS WITH DIABETIC POLYNEUROPATHY, WITH LONG-TERM CURRENT USE OF INSULIN: ICD-10-CM

## 2021-09-23 DIAGNOSIS — E11.42 TYPE 2 DIABETES MELLITUS WITH DIABETIC POLYNEUROPATHY, WITH LONG-TERM CURRENT USE OF INSULIN: ICD-10-CM

## 2021-09-23 LAB — BACTERIA SPEC AEROBE CULT: ABNORMAL

## 2021-09-23 PROCEDURE — 99213 PR OFFICE/OUTPT VISIT, EST, LEVL III, 20-29 MIN: ICD-10-PCS | Mod: S$PBB,,, | Performed by: PODIATRIST

## 2021-09-23 PROCEDURE — 99999 PR PBB SHADOW E&M-EST. PATIENT-LVL III: CPT | Mod: PBBFAC,,, | Performed by: PODIATRIST

## 2021-09-23 PROCEDURE — 99213 OFFICE O/P EST LOW 20 MIN: CPT | Mod: S$PBB,,, | Performed by: PODIATRIST

## 2021-09-23 PROCEDURE — 99999 PR PBB SHADOW E&M-EST. PATIENT-LVL III: ICD-10-PCS | Mod: PBBFAC,,, | Performed by: PODIATRIST

## 2021-09-23 PROCEDURE — 99213 OFFICE O/P EST LOW 20 MIN: CPT | Mod: PBBFAC | Performed by: PODIATRIST

## 2021-09-23 RX ORDER — SULFAMETHOXAZOLE AND TRIMETHOPRIM 400; 80 MG/1; MG/1
1 TABLET ORAL 2 TIMES DAILY
Qty: 28 TABLET | Refills: 0 | Status: SHIPPED | OUTPATIENT
Start: 2021-09-23 | End: 2021-10-09

## 2021-09-27 ENCOUNTER — OFFICE VISIT (OUTPATIENT)
Dept: PODIATRY | Facility: CLINIC | Age: 51
End: 2021-09-27
Payer: MEDICAID

## 2021-09-27 VITALS
SYSTOLIC BLOOD PRESSURE: 153 MMHG | HEART RATE: 92 BPM | WEIGHT: 227.06 LBS | DIASTOLIC BLOOD PRESSURE: 90 MMHG | BODY MASS INDEX: 30.09 KG/M2 | HEIGHT: 73 IN

## 2021-09-27 DIAGNOSIS — E11.42 TYPE 2 DIABETES MELLITUS WITH DIABETIC POLYNEUROPATHY, WITH LONG-TERM CURRENT USE OF INSULIN: ICD-10-CM

## 2021-09-27 DIAGNOSIS — L97.422 DIABETIC ULCER OF LEFT MIDFOOT ASSOCIATED WITH TYPE 2 DIABETES MELLITUS, WITH FAT LAYER EXPOSED: Primary | ICD-10-CM

## 2021-09-27 DIAGNOSIS — E11.621 DIABETIC ULCER OF LEFT MIDFOOT ASSOCIATED WITH TYPE 2 DIABETES MELLITUS, WITH FAT LAYER EXPOSED: Primary | ICD-10-CM

## 2021-09-27 DIAGNOSIS — Z79.4 TYPE 2 DIABETES MELLITUS WITH DIABETIC POLYNEUROPATHY, WITH LONG-TERM CURRENT USE OF INSULIN: ICD-10-CM

## 2021-09-27 LAB — BACTERIA SPEC ANAEROBE CULT: NORMAL

## 2021-09-27 PROCEDURE — 99499 UNLISTED E&M SERVICE: CPT | Mod: S$PBB,,, | Performed by: PODIATRIST

## 2021-09-27 PROCEDURE — 99213 OFFICE O/P EST LOW 20 MIN: CPT | Mod: PBBFAC,25 | Performed by: PODIATRIST

## 2021-09-27 PROCEDURE — 99999 PR PBB SHADOW E&M-EST. PATIENT-LVL III: ICD-10-PCS | Mod: PBBFAC,,, | Performed by: PODIATRIST

## 2021-09-27 PROCEDURE — 29445 PR APPLY RIGID LEG CAST: ICD-10-PCS | Mod: S$PBB,LT,, | Performed by: PODIATRIST

## 2021-09-27 PROCEDURE — 99499 NO LOS: ICD-10-PCS | Mod: S$PBB,,, | Performed by: PODIATRIST

## 2021-09-27 PROCEDURE — 29445 APPL RIGID TOT CNTC LEG CAST: CPT | Mod: S$PBB,LT,, | Performed by: PODIATRIST

## 2021-09-27 PROCEDURE — 29445 APPL RIGID TOT CNTC LEG CAST: CPT | Mod: PBBFAC,GC | Performed by: PODIATRIST

## 2021-09-27 PROCEDURE — 99999 PR PBB SHADOW E&M-EST. PATIENT-LVL III: CPT | Mod: PBBFAC,,, | Performed by: PODIATRIST

## 2021-09-27 RX ORDER — LISINOPRIL 5 MG/1
5 TABLET ORAL DAILY
Qty: 90 TABLET | Refills: 3 | Status: SHIPPED | OUTPATIENT
Start: 2021-09-27 | End: 2022-01-25

## 2021-09-30 ENCOUNTER — OFFICE VISIT (OUTPATIENT)
Dept: PODIATRY | Facility: CLINIC | Age: 51
End: 2021-09-30
Payer: MEDICAID

## 2021-09-30 VITALS
HEIGHT: 73 IN | DIASTOLIC BLOOD PRESSURE: 100 MMHG | WEIGHT: 227 LBS | HEART RATE: 90 BPM | RESPIRATION RATE: 18 BRPM | BODY MASS INDEX: 30.09 KG/M2 | SYSTOLIC BLOOD PRESSURE: 164 MMHG

## 2021-09-30 DIAGNOSIS — E11.621 DIABETIC ULCER OF LEFT MIDFOOT ASSOCIATED WITH TYPE 2 DIABETES MELLITUS, WITH FAT LAYER EXPOSED: ICD-10-CM

## 2021-09-30 DIAGNOSIS — L97.422 DIABETIC ULCER OF LEFT MIDFOOT ASSOCIATED WITH TYPE 2 DIABETES MELLITUS, WITH FAT LAYER EXPOSED: ICD-10-CM

## 2021-09-30 DIAGNOSIS — Z79.4 TYPE 2 DIABETES MELLITUS WITH DIABETIC POLYNEUROPATHY, WITH LONG-TERM CURRENT USE OF INSULIN: Primary | ICD-10-CM

## 2021-09-30 DIAGNOSIS — E11.42 TYPE 2 DIABETES MELLITUS WITH DIABETIC POLYNEUROPATHY, WITH LONG-TERM CURRENT USE OF INSULIN: Primary | ICD-10-CM

## 2021-09-30 PROCEDURE — 99499 UNLISTED E&M SERVICE: CPT | Mod: S$PBB,,, | Performed by: PODIATRIST

## 2021-09-30 PROCEDURE — 11042 DBRDMT SUBQ TIS 1ST 20SQCM/<: CPT | Mod: PBBFAC | Performed by: PODIATRIST

## 2021-09-30 PROCEDURE — 11042 PR DEBRIDEMENT, SKIN, SUB-Q TISSUE,=<20 SQ CM: ICD-10-PCS | Mod: S$PBB,,, | Performed by: PODIATRIST

## 2021-09-30 PROCEDURE — 99999 PR PBB SHADOW E&M-EST. PATIENT-LVL III: ICD-10-PCS | Mod: PBBFAC,,, | Performed by: PODIATRIST

## 2021-09-30 PROCEDURE — 99999 PR PBB SHADOW E&M-EST. PATIENT-LVL III: CPT | Mod: PBBFAC,,, | Performed by: PODIATRIST

## 2021-09-30 PROCEDURE — 99213 OFFICE O/P EST LOW 20 MIN: CPT | Mod: PBBFAC | Performed by: PODIATRIST

## 2021-09-30 PROCEDURE — 11042 DBRDMT SUBQ TIS 1ST 20SQCM/<: CPT | Mod: S$PBB,,, | Performed by: PODIATRIST

## 2021-09-30 PROCEDURE — 99499 NO LOS: ICD-10-PCS | Mod: S$PBB,,, | Performed by: PODIATRIST

## 2021-10-04 ENCOUNTER — OFFICE VISIT (OUTPATIENT)
Dept: PODIATRY | Facility: CLINIC | Age: 51
End: 2021-10-04
Payer: MEDICAID

## 2021-10-04 ENCOUNTER — TELEPHONE (OUTPATIENT)
Dept: PULMONOLOGY | Facility: CLINIC | Age: 51
End: 2021-10-04

## 2021-10-04 VITALS
BODY MASS INDEX: 30.09 KG/M2 | RESPIRATION RATE: 18 BRPM | HEART RATE: 89 BPM | WEIGHT: 227 LBS | DIASTOLIC BLOOD PRESSURE: 90 MMHG | HEIGHT: 73 IN | SYSTOLIC BLOOD PRESSURE: 145 MMHG

## 2021-10-04 DIAGNOSIS — Z79.4 TYPE 2 DIABETES MELLITUS WITH DIABETIC POLYNEUROPATHY, WITH LONG-TERM CURRENT USE OF INSULIN: Primary | ICD-10-CM

## 2021-10-04 DIAGNOSIS — E11.42 TYPE 2 DIABETES MELLITUS WITH DIABETIC POLYNEUROPATHY, WITH LONG-TERM CURRENT USE OF INSULIN: Primary | ICD-10-CM

## 2021-10-04 DIAGNOSIS — L97.422 DIABETIC ULCER OF LEFT MIDFOOT ASSOCIATED WITH TYPE 2 DIABETES MELLITUS, WITH FAT LAYER EXPOSED: ICD-10-CM

## 2021-10-04 DIAGNOSIS — E11.621 DIABETIC ULCER OF LEFT MIDFOOT ASSOCIATED WITH TYPE 2 DIABETES MELLITUS, WITH FAT LAYER EXPOSED: ICD-10-CM

## 2021-10-04 PROCEDURE — 99213 OFFICE O/P EST LOW 20 MIN: CPT | Mod: S$PBB,,, | Performed by: PODIATRIST

## 2021-10-04 PROCEDURE — 99999 PR PBB SHADOW E&M-EST. PATIENT-LVL IV: ICD-10-PCS | Mod: PBBFAC,,, | Performed by: PODIATRIST

## 2021-10-04 PROCEDURE — 99999 PR PBB SHADOW E&M-EST. PATIENT-LVL IV: CPT | Mod: PBBFAC,,, | Performed by: PODIATRIST

## 2021-10-04 PROCEDURE — 99213 PR OFFICE/OUTPT VISIT, EST, LEVL III, 20-29 MIN: ICD-10-PCS | Mod: S$PBB,,, | Performed by: PODIATRIST

## 2021-10-04 PROCEDURE — 99214 OFFICE O/P EST MOD 30 MIN: CPT | Mod: PBBFAC | Performed by: PODIATRIST

## 2021-10-07 ENCOUNTER — OFFICE VISIT (OUTPATIENT)
Dept: PODIATRY | Facility: CLINIC | Age: 51
End: 2021-10-07
Payer: MEDICAID

## 2021-10-07 VITALS
DIASTOLIC BLOOD PRESSURE: 82 MMHG | SYSTOLIC BLOOD PRESSURE: 142 MMHG | BODY MASS INDEX: 30.09 KG/M2 | HEIGHT: 73 IN | RESPIRATION RATE: 18 BRPM | WEIGHT: 227 LBS | HEART RATE: 93 BPM

## 2021-10-07 DIAGNOSIS — E11.621 DIABETIC ULCER OF LEFT MIDFOOT ASSOCIATED WITH TYPE 2 DIABETES MELLITUS, WITH FAT LAYER EXPOSED: Primary | ICD-10-CM

## 2021-10-07 DIAGNOSIS — Z79.4 TYPE 2 DIABETES MELLITUS WITH DIABETIC POLYNEUROPATHY, WITH LONG-TERM CURRENT USE OF INSULIN: ICD-10-CM

## 2021-10-07 DIAGNOSIS — E11.42 TYPE 2 DIABETES MELLITUS WITH DIABETIC POLYNEUROPATHY, WITH LONG-TERM CURRENT USE OF INSULIN: ICD-10-CM

## 2021-10-07 DIAGNOSIS — L97.422 DIABETIC ULCER OF LEFT MIDFOOT ASSOCIATED WITH TYPE 2 DIABETES MELLITUS, WITH FAT LAYER EXPOSED: Primary | ICD-10-CM

## 2021-10-07 PROCEDURE — 99213 OFFICE O/P EST LOW 20 MIN: CPT | Mod: S$PBB,,, | Performed by: PODIATRIST

## 2021-10-07 PROCEDURE — 99213 OFFICE O/P EST LOW 20 MIN: CPT | Mod: PBBFAC | Performed by: PODIATRIST

## 2021-10-07 PROCEDURE — 99999 PR PBB SHADOW E&M-EST. PATIENT-LVL III: ICD-10-PCS | Mod: PBBFAC,,, | Performed by: PODIATRIST

## 2021-10-07 PROCEDURE — 99213 PR OFFICE/OUTPT VISIT, EST, LEVL III, 20-29 MIN: ICD-10-PCS | Mod: S$PBB,,, | Performed by: PODIATRIST

## 2021-10-07 PROCEDURE — 99999 PR PBB SHADOW E&M-EST. PATIENT-LVL III: CPT | Mod: PBBFAC,,, | Performed by: PODIATRIST

## 2021-10-11 ENCOUNTER — OFFICE VISIT (OUTPATIENT)
Dept: PODIATRY | Facility: CLINIC | Age: 51
End: 2021-10-11
Payer: MEDICAID

## 2021-10-11 VITALS
DIASTOLIC BLOOD PRESSURE: 86 MMHG | WEIGHT: 227 LBS | HEART RATE: 87 BPM | BODY MASS INDEX: 30.09 KG/M2 | HEIGHT: 73 IN | RESPIRATION RATE: 18 BRPM | SYSTOLIC BLOOD PRESSURE: 143 MMHG

## 2021-10-11 DIAGNOSIS — L97.422 DIABETIC ULCER OF LEFT MIDFOOT ASSOCIATED WITH TYPE 2 DIABETES MELLITUS, WITH FAT LAYER EXPOSED: ICD-10-CM

## 2021-10-11 DIAGNOSIS — Z79.4 TYPE 2 DIABETES MELLITUS WITH DIABETIC POLYNEUROPATHY, WITH LONG-TERM CURRENT USE OF INSULIN: Primary | ICD-10-CM

## 2021-10-11 DIAGNOSIS — E11.621 DIABETIC ULCER OF LEFT MIDFOOT ASSOCIATED WITH TYPE 2 DIABETES MELLITUS, WITH FAT LAYER EXPOSED: ICD-10-CM

## 2021-10-11 DIAGNOSIS — E11.42 TYPE 2 DIABETES MELLITUS WITH DIABETIC POLYNEUROPATHY, WITH LONG-TERM CURRENT USE OF INSULIN: Primary | ICD-10-CM

## 2021-10-11 PROCEDURE — 99999 PR PBB SHADOW E&M-EST. PATIENT-LVL V: CPT | Mod: PBBFAC,,, | Performed by: PODIATRIST

## 2021-10-11 PROCEDURE — 99215 OFFICE O/P EST HI 40 MIN: CPT | Mod: PBBFAC | Performed by: PODIATRIST

## 2021-10-11 PROCEDURE — 99213 PR OFFICE/OUTPT VISIT, EST, LEVL III, 20-29 MIN: ICD-10-PCS | Mod: S$PBB,,, | Performed by: PODIATRIST

## 2021-10-11 PROCEDURE — 99213 OFFICE O/P EST LOW 20 MIN: CPT | Mod: S$PBB,,, | Performed by: PODIATRIST

## 2021-10-11 PROCEDURE — 99999 PR PBB SHADOW E&M-EST. PATIENT-LVL V: ICD-10-PCS | Mod: PBBFAC,,, | Performed by: PODIATRIST

## 2021-10-14 ENCOUNTER — OFFICE VISIT (OUTPATIENT)
Dept: PODIATRY | Facility: CLINIC | Age: 51
End: 2021-10-14
Payer: MEDICAID

## 2021-10-14 DIAGNOSIS — L97.422 DIABETIC ULCER OF LEFT MIDFOOT ASSOCIATED WITH TYPE 2 DIABETES MELLITUS, WITH FAT LAYER EXPOSED: ICD-10-CM

## 2021-10-14 DIAGNOSIS — E11.42 TYPE 2 DIABETES MELLITUS WITH DIABETIC POLYNEUROPATHY, WITH LONG-TERM CURRENT USE OF INSULIN: Primary | ICD-10-CM

## 2021-10-14 DIAGNOSIS — Z79.4 TYPE 2 DIABETES MELLITUS WITH DIABETIC POLYNEUROPATHY, WITH LONG-TERM CURRENT USE OF INSULIN: Primary | ICD-10-CM

## 2021-10-14 DIAGNOSIS — E11.621 DIABETIC ULCER OF LEFT MIDFOOT ASSOCIATED WITH TYPE 2 DIABETES MELLITUS, WITH FAT LAYER EXPOSED: ICD-10-CM

## 2021-10-14 PROCEDURE — 99999 PR PBB SHADOW E&M-EST. PATIENT-LVL II: ICD-10-PCS | Mod: PBBFAC,,, | Performed by: PODIATRIST

## 2021-10-14 PROCEDURE — 99213 PR OFFICE/OUTPT VISIT, EST, LEVL III, 20-29 MIN: ICD-10-PCS | Mod: S$PBB,,, | Performed by: PODIATRIST

## 2021-10-14 PROCEDURE — 99999 PR PBB SHADOW E&M-EST. PATIENT-LVL II: CPT | Mod: PBBFAC,,, | Performed by: PODIATRIST

## 2021-10-14 PROCEDURE — 99213 OFFICE O/P EST LOW 20 MIN: CPT | Mod: S$PBB,,, | Performed by: PODIATRIST

## 2021-10-14 PROCEDURE — 99212 OFFICE O/P EST SF 10 MIN: CPT | Mod: PBBFAC | Performed by: PODIATRIST

## 2021-10-18 ENCOUNTER — OFFICE VISIT (OUTPATIENT)
Dept: PODIATRY | Facility: CLINIC | Age: 51
End: 2021-10-18
Payer: MEDICAID

## 2021-10-18 VITALS
WEIGHT: 227 LBS | BODY MASS INDEX: 30.09 KG/M2 | HEIGHT: 73 IN | RESPIRATION RATE: 18 BRPM | SYSTOLIC BLOOD PRESSURE: 156 MMHG | DIASTOLIC BLOOD PRESSURE: 81 MMHG | HEART RATE: 84 BPM

## 2021-10-18 DIAGNOSIS — E11.42 TYPE 2 DIABETES MELLITUS WITH DIABETIC POLYNEUROPATHY, WITH LONG-TERM CURRENT USE OF INSULIN: ICD-10-CM

## 2021-10-18 DIAGNOSIS — E11.621 DIABETIC ULCER OF LEFT MIDFOOT ASSOCIATED WITH TYPE 2 DIABETES MELLITUS, WITH FAT LAYER EXPOSED: Primary | ICD-10-CM

## 2021-10-18 DIAGNOSIS — Z79.4 TYPE 2 DIABETES MELLITUS WITH DIABETIC POLYNEUROPATHY, WITH LONG-TERM CURRENT USE OF INSULIN: ICD-10-CM

## 2021-10-18 DIAGNOSIS — L97.422 DIABETIC ULCER OF LEFT MIDFOOT ASSOCIATED WITH TYPE 2 DIABETES MELLITUS, WITH FAT LAYER EXPOSED: Primary | ICD-10-CM

## 2021-10-18 PROCEDURE — 99214 OFFICE O/P EST MOD 30 MIN: CPT | Mod: S$PBB,,, | Performed by: PODIATRIST

## 2021-10-18 PROCEDURE — 99214 PR OFFICE/OUTPT VISIT, EST, LEVL IV, 30-39 MIN: ICD-10-PCS | Mod: S$PBB,,, | Performed by: PODIATRIST

## 2021-10-18 PROCEDURE — 99999 PR PBB SHADOW E&M-EST. PATIENT-LVL IV: CPT | Mod: PBBFAC,,, | Performed by: PODIATRIST

## 2021-10-18 PROCEDURE — 99214 OFFICE O/P EST MOD 30 MIN: CPT | Mod: PBBFAC | Performed by: PODIATRIST

## 2021-10-18 PROCEDURE — 99999 PR PBB SHADOW E&M-EST. PATIENT-LVL IV: ICD-10-PCS | Mod: PBBFAC,,, | Performed by: PODIATRIST

## 2021-10-21 ENCOUNTER — OFFICE VISIT (OUTPATIENT)
Dept: PODIATRY | Facility: CLINIC | Age: 51
End: 2021-10-21
Payer: MEDICAID

## 2021-10-21 VITALS
BODY MASS INDEX: 30.09 KG/M2 | HEIGHT: 73 IN | DIASTOLIC BLOOD PRESSURE: 98 MMHG | WEIGHT: 227.06 LBS | SYSTOLIC BLOOD PRESSURE: 150 MMHG

## 2021-10-21 DIAGNOSIS — E08.621 DIABETIC ULCER OF RIGHT MIDFOOT ASSOCIATED WITH DIABETES MELLITUS DUE TO UNDERLYING CONDITION, LIMITED TO BREAKDOWN OF SKIN: ICD-10-CM

## 2021-10-21 DIAGNOSIS — L97.411 DIABETIC ULCER OF RIGHT MIDFOOT ASSOCIATED WITH DIABETES MELLITUS DUE TO UNDERLYING CONDITION, LIMITED TO BREAKDOWN OF SKIN: ICD-10-CM

## 2021-10-21 DIAGNOSIS — E11.49 TYPE II DIABETES MELLITUS WITH NEUROLOGICAL MANIFESTATIONS: Primary | ICD-10-CM

## 2021-10-21 DIAGNOSIS — Z12.11 SPECIAL SCREENING FOR MALIGNANT NEOPLASM OF COLON: Primary | ICD-10-CM

## 2021-10-21 PROCEDURE — 99499 NO LOS: ICD-10-PCS | Mod: S$PBB,,, | Performed by: PODIATRIST

## 2021-10-21 PROCEDURE — 99999 PR PBB SHADOW E&M-EST. PATIENT-LVL III: CPT | Mod: PBBFAC,,, | Performed by: PODIATRIST

## 2021-10-21 PROCEDURE — 11042 WOUND DEBRIDEMENT: ICD-10-PCS | Mod: S$PBB,,, | Performed by: PODIATRIST

## 2021-10-21 PROCEDURE — 99999 PR PBB SHADOW E&M-EST. PATIENT-LVL III: ICD-10-PCS | Mod: PBBFAC,,, | Performed by: PODIATRIST

## 2021-10-21 PROCEDURE — 99213 OFFICE O/P EST LOW 20 MIN: CPT | Mod: PBBFAC,25 | Performed by: PODIATRIST

## 2021-10-21 PROCEDURE — 11042 DBRDMT SUBQ TIS 1ST 20SQCM/<: CPT | Mod: PBBFAC | Performed by: PODIATRIST

## 2021-10-21 PROCEDURE — 99499 UNLISTED E&M SERVICE: CPT | Mod: S$PBB,,, | Performed by: PODIATRIST

## 2021-10-25 ENCOUNTER — HOSPITAL ENCOUNTER (OUTPATIENT)
Dept: WOUND CARE | Facility: HOSPITAL | Age: 51
Discharge: HOME | End: 2021-10-25
Attending: PODIATRIST
Payer: MEDICAID

## 2021-10-25 VITALS
HEART RATE: 70 BPM | BODY MASS INDEX: 28.49 KG/M2 | DIASTOLIC BLOOD PRESSURE: 70 MMHG | RESPIRATION RATE: 20 BRPM | WEIGHT: 215 LBS | SYSTOLIC BLOOD PRESSURE: 134 MMHG | TEMPERATURE: 98 F | HEIGHT: 73 IN

## 2021-10-25 DIAGNOSIS — E11.621 DIABETIC ULCER OF LEFT MIDFOOT ASSOCIATED WITH TYPE 2 DIABETES MELLITUS, WITH FAT LAYER EXPOSED: ICD-10-CM

## 2021-10-25 DIAGNOSIS — L97.422 DIABETIC ULCER OF LEFT MIDFOOT ASSOCIATED WITH TYPE 2 DIABETES MELLITUS, WITH FAT LAYER EXPOSED: ICD-10-CM

## 2021-10-25 PROCEDURE — 99214 PR OFFICE/OUTPT VISIT, EST, LEVL IV, 30-39 MIN: ICD-10-PCS | Mod: ,,, | Performed by: INTERNAL MEDICINE

## 2021-10-25 PROCEDURE — 99214 OFFICE O/P EST MOD 30 MIN: CPT | Mod: ,,, | Performed by: INTERNAL MEDICINE

## 2021-10-25 PROCEDURE — 29445 APPL RIGID TOT CNTC LEG CAST: CPT | Mod: LT | Performed by: INTERNAL MEDICINE

## 2021-10-29 ENCOUNTER — HOSPITAL ENCOUNTER (OUTPATIENT)
Dept: WOUND CARE | Facility: HOSPITAL | Age: 51
Discharge: HOME | End: 2021-10-29
Attending: PODIATRIST
Payer: MEDICAID

## 2021-10-29 VITALS — SYSTOLIC BLOOD PRESSURE: 185 MMHG | TEMPERATURE: 98 F | DIASTOLIC BLOOD PRESSURE: 98 MMHG | HEART RATE: 82 BPM

## 2021-10-29 DIAGNOSIS — L97.422 DIABETIC ULCER OF LEFT MIDFOOT ASSOCIATED WITH TYPE 2 DIABETES MELLITUS, WITH FAT LAYER EXPOSED: Primary | ICD-10-CM

## 2021-10-29 DIAGNOSIS — E11.621 DIABETIC ULCER OF LEFT MIDFOOT ASSOCIATED WITH TYPE 2 DIABETES MELLITUS, WITH FAT LAYER EXPOSED: Primary | ICD-10-CM

## 2021-10-29 PROCEDURE — 29445 APPL RIGID TOT CNTC LEG CAST: CPT | Mod: LT

## 2021-10-29 PROCEDURE — 29445 PR APPLY RIGID LEG CAST: ICD-10-PCS | Mod: LT,,, | Performed by: PODIATRIST

## 2021-10-29 PROCEDURE — 99499 UNLISTED E&M SERVICE: CPT | Mod: ,,, | Performed by: PODIATRIST

## 2021-10-29 PROCEDURE — 99499 NO LOS: ICD-10-PCS | Mod: ,,, | Performed by: PODIATRIST

## 2021-10-29 PROCEDURE — 29445 APPL RIGID TOT CNTC LEG CAST: CPT | Mod: LT,,, | Performed by: PODIATRIST

## 2021-11-03 ENCOUNTER — HOSPITAL ENCOUNTER (OUTPATIENT)
Dept: WOUND CARE | Facility: HOSPITAL | Age: 51
Discharge: HOME | End: 2021-11-03
Attending: FAMILY MEDICINE
Payer: MEDICAID

## 2021-11-03 VITALS — TEMPERATURE: 98 F | HEART RATE: 84 BPM | SYSTOLIC BLOOD PRESSURE: 167 MMHG | DIASTOLIC BLOOD PRESSURE: 92 MMHG

## 2021-11-03 DIAGNOSIS — E11.621 DIABETIC ULCER OF LEFT MIDFOOT ASSOCIATED WITH TYPE 2 DIABETES MELLITUS, WITH FAT LAYER EXPOSED: Primary | ICD-10-CM

## 2021-11-03 DIAGNOSIS — L97.422 DIABETIC ULCER OF LEFT MIDFOOT ASSOCIATED WITH TYPE 2 DIABETES MELLITUS, WITH FAT LAYER EXPOSED: Primary | ICD-10-CM

## 2021-11-03 PROCEDURE — 29445 APPL RIGID TOT CNTC LEG CAST: CPT | Performed by: FAMILY MEDICINE

## 2021-11-03 PROCEDURE — 99214 PR OFFICE/OUTPT VISIT, EST, LEVL IV, 30-39 MIN: ICD-10-PCS | Mod: 25,,, | Performed by: FAMILY MEDICINE

## 2021-11-03 PROCEDURE — 11042 DBRDMT SUBQ TIS 1ST 20SQCM/<: CPT | Mod: ,,, | Performed by: FAMILY MEDICINE

## 2021-11-03 PROCEDURE — 27201912 HC WOUND CARE DEBRIDEMENT SUPPLIES: Performed by: FAMILY MEDICINE

## 2021-11-03 PROCEDURE — 11042 DEBRIDEMENT: ICD-10-PCS | Mod: ,,, | Performed by: FAMILY MEDICINE

## 2021-11-03 PROCEDURE — 11042 DBRDMT SUBQ TIS 1ST 20SQCM/<: CPT | Performed by: FAMILY MEDICINE

## 2021-11-03 PROCEDURE — 99214 OFFICE O/P EST MOD 30 MIN: CPT | Mod: 25,,, | Performed by: FAMILY MEDICINE

## 2021-11-09 ENCOUNTER — HOSPITAL ENCOUNTER (OUTPATIENT)
Dept: WOUND CARE | Facility: HOSPITAL | Age: 51
Discharge: HOME | End: 2021-11-09
Attending: FAMILY MEDICINE
Payer: MEDICAID

## 2021-11-09 VITALS — SYSTOLIC BLOOD PRESSURE: 164 MMHG | HEART RATE: 80 BPM | DIASTOLIC BLOOD PRESSURE: 95 MMHG | TEMPERATURE: 98 F

## 2021-11-09 DIAGNOSIS — E08.621 DIABETIC ULCER OF RIGHT MIDFOOT ASSOCIATED WITH DIABETES MELLITUS DUE TO UNDERLYING CONDITION, WITH FAT LAYER EXPOSED: ICD-10-CM

## 2021-11-09 DIAGNOSIS — E11.621 DIABETIC ULCER OF LEFT MIDFOOT ASSOCIATED WITH TYPE 2 DIABETES MELLITUS, WITH FAT LAYER EXPOSED: Primary | ICD-10-CM

## 2021-11-09 DIAGNOSIS — L97.422 DIABETIC ULCER OF LEFT MIDFOOT ASSOCIATED WITH TYPE 2 DIABETES MELLITUS, WITH FAT LAYER EXPOSED: Primary | ICD-10-CM

## 2021-11-09 DIAGNOSIS — L97.412 DIABETIC ULCER OF RIGHT MIDFOOT ASSOCIATED WITH DIABETES MELLITUS DUE TO UNDERLYING CONDITION, WITH FAT LAYER EXPOSED: ICD-10-CM

## 2021-11-09 PROCEDURE — 29445 APPL RIGID TOT CNTC LEG CAST: CPT | Performed by: FAMILY MEDICINE

## 2021-11-09 PROCEDURE — 99213 OFFICE O/P EST LOW 20 MIN: CPT | Mod: 25 | Performed by: FAMILY MEDICINE

## 2021-11-09 PROCEDURE — 29445 APPL RIGID TOT CNTC LEG CAST: CPT

## 2021-11-09 PROCEDURE — 99214 PR OFFICE/OUTPT VISIT, EST, LEVL IV, 30-39 MIN: ICD-10-PCS | Mod: ,,, | Performed by: FAMILY MEDICINE

## 2021-11-09 PROCEDURE — 99214 OFFICE O/P EST MOD 30 MIN: CPT | Mod: ,,, | Performed by: FAMILY MEDICINE

## 2021-11-15 ENCOUNTER — HOSPITAL ENCOUNTER (OUTPATIENT)
Dept: WOUND CARE | Facility: HOSPITAL | Age: 51
Discharge: HOME | End: 2021-11-15
Attending: INTERNAL MEDICINE
Payer: MEDICAID

## 2021-11-15 VITALS — SYSTOLIC BLOOD PRESSURE: 142 MMHG | HEART RATE: 86 BPM | TEMPERATURE: 98 F | DIASTOLIC BLOOD PRESSURE: 82 MMHG

## 2021-11-15 DIAGNOSIS — E11.621 DIABETIC ULCER OF LEFT FOOT: ICD-10-CM

## 2021-11-15 DIAGNOSIS — L97.529 DIABETIC ULCER OF LEFT FOOT: ICD-10-CM

## 2021-11-15 DIAGNOSIS — E11.621 DIABETIC ULCER OF LEFT HEEL ASSOCIATED WITH TYPE 2 DIABETES MELLITUS, WITH FAT LAYER EXPOSED: Primary | ICD-10-CM

## 2021-11-15 DIAGNOSIS — L97.422 DIABETIC ULCER OF LEFT HEEL ASSOCIATED WITH TYPE 2 DIABETES MELLITUS, WITH FAT LAYER EXPOSED: Primary | ICD-10-CM

## 2021-11-15 PROCEDURE — 87186 SC STD MICRODIL/AGAR DIL: CPT | Performed by: INTERNAL MEDICINE

## 2021-11-15 PROCEDURE — 29445 APPL RIGID TOT CNTC LEG CAST: CPT | Performed by: INTERNAL MEDICINE

## 2021-11-15 PROCEDURE — 87077 CULTURE AEROBIC IDENTIFY: CPT | Performed by: INTERNAL MEDICINE

## 2021-11-15 PROCEDURE — 99214 OFFICE O/P EST MOD 30 MIN: CPT | Mod: 25,,, | Performed by: INTERNAL MEDICINE

## 2021-11-15 PROCEDURE — 11042 DBRDMT SUBQ TIS 1ST 20SQCM/<: CPT | Performed by: INTERNAL MEDICINE

## 2021-11-15 PROCEDURE — 87205 SMEAR GRAM STAIN: CPT | Performed by: INTERNAL MEDICINE

## 2021-11-15 PROCEDURE — 11042 DBRDMT SUBQ TIS 1ST 20SQCM/<: CPT | Mod: ,,, | Performed by: INTERNAL MEDICINE

## 2021-11-15 PROCEDURE — 87070 CULTURE OTHR SPECIMN AEROBIC: CPT | Performed by: INTERNAL MEDICINE

## 2021-11-15 PROCEDURE — 99214 PR OFFICE/OUTPT VISIT, EST, LEVL IV, 30-39 MIN: ICD-10-PCS | Mod: 25,,, | Performed by: INTERNAL MEDICINE

## 2021-11-15 PROCEDURE — 11042 DEBRIDEMENT: ICD-10-PCS | Mod: ,,, | Performed by: INTERNAL MEDICINE

## 2021-11-17 ENCOUNTER — PATIENT MESSAGE (OUTPATIENT)
Dept: FAMILY MEDICINE | Facility: CLINIC | Age: 51
End: 2021-11-17
Payer: MEDICAID

## 2021-11-17 ENCOUNTER — PATIENT MESSAGE (OUTPATIENT)
Dept: ENDOSCOPY | Facility: HOSPITAL | Age: 51
End: 2021-11-17
Payer: MEDICAID

## 2021-11-17 DIAGNOSIS — L03.116 CELLULITIS OF LEFT LOWER EXTREMITY: Primary | ICD-10-CM

## 2021-11-17 DIAGNOSIS — Z12.11 SPECIAL SCREENING FOR MALIGNANT NEOPLASMS, COLON: Primary | ICD-10-CM

## 2021-11-17 LAB
BACTERIA TISS AEROBE CULT: ABNORMAL
GRAM STN SPEC: ABNORMAL
GRAM STN SPEC: ABNORMAL

## 2021-11-17 RX ORDER — CIPROFLOXACIN 500 MG/1
500 TABLET ORAL 2 TIMES DAILY
Qty: 20 TABLET | Refills: 0 | Status: SHIPPED | OUTPATIENT
Start: 2021-11-17 | End: 2021-11-29

## 2021-11-17 RX ORDER — POLYETHYLENE GLYCOL 3350, SODIUM SULFATE ANHYDROUS, SODIUM BICARBONATE, SODIUM CHLORIDE, POTASSIUM CHLORIDE 236; 22.74; 6.74; 5.86; 2.97 G/4L; G/4L; G/4L; G/4L; G/4L
4 POWDER, FOR SOLUTION ORAL ONCE
Qty: 4000 ML | Refills: 0 | Status: SHIPPED | OUTPATIENT
Start: 2021-11-17 | End: 2021-11-20

## 2021-11-19 ENCOUNTER — HOSPITAL ENCOUNTER (OUTPATIENT)
Dept: WOUND CARE | Facility: HOSPITAL | Age: 51
Discharge: HOME | End: 2021-11-19
Attending: INTERNAL MEDICINE
Payer: MEDICAID

## 2021-11-19 VITALS — DIASTOLIC BLOOD PRESSURE: 94 MMHG | SYSTOLIC BLOOD PRESSURE: 174 MMHG | HEART RATE: 82 BPM | TEMPERATURE: 98 F

## 2021-11-19 DIAGNOSIS — L97.422 DIABETIC ULCER OF LEFT MIDFOOT ASSOCIATED WITH TYPE 2 DIABETES MELLITUS, WITH FAT LAYER EXPOSED: Primary | ICD-10-CM

## 2021-11-19 DIAGNOSIS — E11.621 DIABETIC ULCER OF LEFT MIDFOOT ASSOCIATED WITH TYPE 2 DIABETES MELLITUS, WITH FAT LAYER EXPOSED: Primary | ICD-10-CM

## 2021-11-19 PROCEDURE — 99213 OFFICE O/P EST LOW 20 MIN: CPT

## 2021-11-23 ENCOUNTER — HOSPITAL ENCOUNTER (OUTPATIENT)
Dept: WOUND CARE | Facility: HOSPITAL | Age: 51
Discharge: HOME | End: 2021-11-23
Attending: FAMILY MEDICINE
Payer: MEDICAID

## 2021-11-23 VITALS
SYSTOLIC BLOOD PRESSURE: 131 MMHG | HEART RATE: 94 BPM | DIASTOLIC BLOOD PRESSURE: 83 MMHG | RESPIRATION RATE: 20 BRPM | TEMPERATURE: 98 F

## 2021-11-23 DIAGNOSIS — L97.422 DIABETIC ULCER OF LEFT MIDFOOT ASSOCIATED WITH TYPE 2 DIABETES MELLITUS, WITH FAT LAYER EXPOSED: Primary | ICD-10-CM

## 2021-11-23 DIAGNOSIS — E11.621 DIABETIC ULCER OF LEFT MIDFOOT ASSOCIATED WITH TYPE 2 DIABETES MELLITUS, WITH FAT LAYER EXPOSED: Primary | ICD-10-CM

## 2021-11-23 PROCEDURE — 99212 OFFICE O/P EST SF 10 MIN: CPT

## 2021-11-26 ENCOUNTER — HOSPITAL ENCOUNTER (OUTPATIENT)
Dept: WOUND CARE | Facility: HOSPITAL | Age: 51
Discharge: HOME | End: 2021-11-26
Attending: INTERNAL MEDICINE
Payer: MEDICAID

## 2021-11-26 VITALS — DIASTOLIC BLOOD PRESSURE: 97 MMHG | HEART RATE: 89 BPM | TEMPERATURE: 98 F | SYSTOLIC BLOOD PRESSURE: 169 MMHG

## 2021-11-26 DIAGNOSIS — E11.621 DIABETIC ULCER OF LEFT MIDFOOT ASSOCIATED WITH TYPE 2 DIABETES MELLITUS, WITH FAT LAYER EXPOSED: Primary | ICD-10-CM

## 2021-11-26 DIAGNOSIS — L97.422 DIABETIC ULCER OF LEFT MIDFOOT ASSOCIATED WITH TYPE 2 DIABETES MELLITUS, WITH FAT LAYER EXPOSED: Primary | ICD-10-CM

## 2021-11-26 PROCEDURE — 27201912 HC WOUND CARE DEBRIDEMENT SUPPLIES: Performed by: INTERNAL MEDICINE

## 2021-11-26 PROCEDURE — 11042 DBRDMT SUBQ TIS 1ST 20SQCM/<: CPT | Mod: ,,, | Performed by: INTERNAL MEDICINE

## 2021-11-26 PROCEDURE — 11042 DBRDMT SUBQ TIS 1ST 20SQCM/<: CPT | Performed by: INTERNAL MEDICINE

## 2021-11-26 PROCEDURE — 99214 PR OFFICE/OUTPT VISIT, EST, LEVL IV, 30-39 MIN: ICD-10-PCS | Mod: 25,,, | Performed by: INTERNAL MEDICINE

## 2021-11-26 PROCEDURE — 99214 OFFICE O/P EST MOD 30 MIN: CPT | Mod: 25,,, | Performed by: INTERNAL MEDICINE

## 2021-11-26 PROCEDURE — 11042 WOUND DEBRIDEMENT: ICD-10-PCS | Mod: ,,, | Performed by: INTERNAL MEDICINE

## 2021-11-29 ENCOUNTER — HOSPITAL ENCOUNTER (OUTPATIENT)
Dept: WOUND CARE | Facility: HOSPITAL | Age: 51
Discharge: HOME | End: 2021-11-29
Attending: INTERNAL MEDICINE
Payer: MEDICAID

## 2021-11-29 VITALS — SYSTOLIC BLOOD PRESSURE: 178 MMHG | TEMPERATURE: 98 F | DIASTOLIC BLOOD PRESSURE: 97 MMHG | HEART RATE: 88 BPM

## 2021-11-29 DIAGNOSIS — E11.621 DIABETIC ULCER OF LEFT MIDFOOT ASSOCIATED WITH TYPE 2 DIABETES MELLITUS, WITH FAT LAYER EXPOSED: Primary | ICD-10-CM

## 2021-11-29 DIAGNOSIS — L97.422 DIABETIC ULCER OF LEFT MIDFOOT ASSOCIATED WITH TYPE 2 DIABETES MELLITUS, WITH FAT LAYER EXPOSED: Primary | ICD-10-CM

## 2021-11-29 PROCEDURE — 99212 OFFICE O/P EST SF 10 MIN: CPT

## 2021-12-03 ENCOUNTER — HOSPITAL ENCOUNTER (OUTPATIENT)
Dept: WOUND CARE | Facility: HOSPITAL | Age: 51
Discharge: HOME | End: 2021-12-03
Attending: PODIATRIST
Payer: MEDICARE

## 2021-12-03 VITALS — SYSTOLIC BLOOD PRESSURE: 148 MMHG | DIASTOLIC BLOOD PRESSURE: 95 MMHG | HEART RATE: 91 BPM | TEMPERATURE: 98 F

## 2021-12-03 DIAGNOSIS — E11.621 DIABETIC ULCER OF LEFT MIDFOOT ASSOCIATED WITH TYPE 2 DIABETES MELLITUS, WITH FAT LAYER EXPOSED: Primary | ICD-10-CM

## 2021-12-03 DIAGNOSIS — L97.422 DIABETIC ULCER OF LEFT MIDFOOT ASSOCIATED WITH TYPE 2 DIABETES MELLITUS, WITH FAT LAYER EXPOSED: Primary | ICD-10-CM

## 2021-12-03 DIAGNOSIS — E11.621 DIABETIC ULCER OF LEFT FOOT: ICD-10-CM

## 2021-12-03 DIAGNOSIS — L97.529 DIABETIC ULCER OF LEFT FOOT: ICD-10-CM

## 2021-12-03 PROCEDURE — 11042 DBRDMT SUBQ TIS 1ST 20SQCM/<: CPT | Mod: ,,, | Performed by: PODIATRIST

## 2021-12-03 PROCEDURE — 99499 NO LOS: ICD-10-PCS | Mod: ,,, | Performed by: PODIATRIST

## 2021-12-03 PROCEDURE — 99499 UNLISTED E&M SERVICE: CPT | Mod: ,,, | Performed by: PODIATRIST

## 2021-12-03 PROCEDURE — 11042 DBRDMT SUBQ TIS 1ST 20SQCM/<: CPT | Performed by: PODIATRIST

## 2021-12-03 PROCEDURE — 11042 WOUND DEBRIDEMENT: ICD-10-PCS | Mod: ,,, | Performed by: PODIATRIST

## 2021-12-03 PROCEDURE — 27201912 HC WOUND CARE DEBRIDEMENT SUPPLIES: Performed by: PODIATRIST

## 2021-12-06 ENCOUNTER — HOSPITAL ENCOUNTER (OUTPATIENT)
Dept: WOUND CARE | Facility: HOSPITAL | Age: 51
Discharge: HOME | End: 2021-12-06
Attending: INTERNAL MEDICINE
Payer: MEDICARE

## 2021-12-06 VITALS
TEMPERATURE: 98 F | RESPIRATION RATE: 20 BRPM | SYSTOLIC BLOOD PRESSURE: 129 MMHG | DIASTOLIC BLOOD PRESSURE: 84 MMHG | HEART RATE: 88 BPM

## 2021-12-06 DIAGNOSIS — E11.621 DIABETIC ULCER OF LEFT FOOT: Primary | ICD-10-CM

## 2021-12-06 DIAGNOSIS — L97.529 DIABETIC ULCER OF LEFT FOOT: Primary | ICD-10-CM

## 2021-12-06 PROCEDURE — 99213 OFFICE O/P EST LOW 20 MIN: CPT

## 2021-12-10 ENCOUNTER — HOSPITAL ENCOUNTER (OUTPATIENT)
Dept: WOUND CARE | Facility: HOSPITAL | Age: 51
Discharge: HOME | End: 2021-12-10
Attending: PODIATRIST
Payer: MEDICARE

## 2021-12-10 ENCOUNTER — HOSPITAL ENCOUNTER (OUTPATIENT)
Dept: RADIOLOGY | Facility: HOSPITAL | Age: 51
Discharge: HOME | End: 2021-12-10
Attending: PODIATRIST
Payer: MEDICARE

## 2021-12-10 ENCOUNTER — TELEPHONE (OUTPATIENT)
Dept: WOUND CARE | Facility: HOSPITAL | Age: 51
End: 2021-12-10
Payer: MEDICARE

## 2021-12-10 VITALS — SYSTOLIC BLOOD PRESSURE: 147 MMHG | DIASTOLIC BLOOD PRESSURE: 94 MMHG | HEART RATE: 94 BPM | TEMPERATURE: 98 F

## 2021-12-10 DIAGNOSIS — E11.621 DIABETIC ULCER OF LEFT MIDFOOT ASSOCIATED WITH TYPE 2 DIABETES MELLITUS, WITH FAT LAYER EXPOSED: ICD-10-CM

## 2021-12-10 DIAGNOSIS — E11.621 DIABETIC ULCER OF LEFT FOOT: ICD-10-CM

## 2021-12-10 DIAGNOSIS — L97.529 DIABETIC ULCER OF LEFT FOOT: ICD-10-CM

## 2021-12-10 DIAGNOSIS — L97.422 DIABETIC ULCER OF LEFT MIDFOOT ASSOCIATED WITH TYPE 2 DIABETES MELLITUS, WITH FAT LAYER EXPOSED: ICD-10-CM

## 2021-12-10 DIAGNOSIS — E11.621 DIABETIC ULCER OF LEFT MIDFOOT ASSOCIATED WITH TYPE 2 DIABETES MELLITUS, WITH FAT LAYER EXPOSED: Primary | ICD-10-CM

## 2021-12-10 DIAGNOSIS — L97.422 DIABETIC ULCER OF LEFT MIDFOOT ASSOCIATED WITH TYPE 2 DIABETES MELLITUS, WITH FAT LAYER EXPOSED: Primary | ICD-10-CM

## 2021-12-10 PROCEDURE — 73630 X-RAY EXAM OF FOOT: CPT | Mod: TC,FY,LT

## 2021-12-10 PROCEDURE — 87077 CULTURE AEROBIC IDENTIFY: CPT | Performed by: PODIATRIST

## 2021-12-10 PROCEDURE — 27201912 HC WOUND CARE DEBRIDEMENT SUPPLIES: Performed by: PODIATRIST

## 2021-12-10 PROCEDURE — 11042 DBRDMT SUBQ TIS 1ST 20SQCM/<: CPT | Mod: ,,, | Performed by: PODIATRIST

## 2021-12-10 PROCEDURE — 99215 OFFICE O/P EST HI 40 MIN: CPT | Mod: 25,,, | Performed by: PODIATRIST

## 2021-12-10 PROCEDURE — 87070 CULTURE OTHR SPECIMN AEROBIC: CPT | Performed by: PODIATRIST

## 2021-12-10 PROCEDURE — 73630 XR FOOT COMPLETE 3 VIEW LEFT: ICD-10-PCS | Mod: 26,LT,, | Performed by: RADIOLOGY

## 2021-12-10 PROCEDURE — 73630 X-RAY EXAM OF FOOT: CPT | Mod: 26,LT,, | Performed by: RADIOLOGY

## 2021-12-10 PROCEDURE — 87186 SC STD MICRODIL/AGAR DIL: CPT | Performed by: PODIATRIST

## 2021-12-10 PROCEDURE — 87075 CULTR BACTERIA EXCEPT BLOOD: CPT | Performed by: PODIATRIST

## 2021-12-10 PROCEDURE — 11042 WOUND DEBRIDEMENT: ICD-10-PCS | Mod: ,,, | Performed by: PODIATRIST

## 2021-12-10 PROCEDURE — 99215 PR OFFICE/OUTPT VISIT, EST, LEVL V, 40-54 MIN: ICD-10-PCS | Mod: 25,,, | Performed by: PODIATRIST

## 2021-12-10 PROCEDURE — 11042 DBRDMT SUBQ TIS 1ST 20SQCM/<: CPT | Performed by: PODIATRIST

## 2021-12-12 LAB — BACTERIA SPEC AEROBE CULT: ABNORMAL

## 2021-12-14 ENCOUNTER — HOSPITAL ENCOUNTER (OUTPATIENT)
Dept: WOUND CARE | Facility: HOSPITAL | Age: 51
Discharge: HOME | End: 2021-12-14
Attending: PODIATRIST
Payer: MEDICARE

## 2021-12-14 VITALS — TEMPERATURE: 98 F | DIASTOLIC BLOOD PRESSURE: 82 MMHG | HEART RATE: 91 BPM | SYSTOLIC BLOOD PRESSURE: 140 MMHG

## 2021-12-14 DIAGNOSIS — L97.422 DIABETIC ULCER OF LEFT MIDFOOT ASSOCIATED WITH TYPE 2 DIABETES MELLITUS, WITH FAT LAYER EXPOSED: Primary | ICD-10-CM

## 2021-12-14 DIAGNOSIS — E11.621 DIABETIC ULCER OF LEFT MIDFOOT ASSOCIATED WITH TYPE 2 DIABETES MELLITUS, WITH FAT LAYER EXPOSED: Primary | ICD-10-CM

## 2021-12-14 LAB — BACTERIA SPEC ANAEROBE CULT: NORMAL

## 2021-12-14 PROCEDURE — 29581 APPL MULTLAYER CMPRN SYS LEG: CPT

## 2021-12-17 ENCOUNTER — HOSPITAL ENCOUNTER (OUTPATIENT)
Dept: WOUND CARE | Facility: HOSPITAL | Age: 51
Discharge: HOME | End: 2021-12-17
Attending: PODIATRIST
Payer: MEDICARE

## 2021-12-17 VITALS
SYSTOLIC BLOOD PRESSURE: 168 MMHG | DIASTOLIC BLOOD PRESSURE: 99 MMHG | HEART RATE: 91 BPM | RESPIRATION RATE: 20 BRPM | TEMPERATURE: 98 F

## 2021-12-17 DIAGNOSIS — E11.621 DIABETIC ULCER OF LEFT MIDFOOT ASSOCIATED WITH TYPE 2 DIABETES MELLITUS, WITH FAT LAYER EXPOSED: Primary | ICD-10-CM

## 2021-12-17 DIAGNOSIS — L97.422 DIABETIC ULCER OF LEFT MIDFOOT ASSOCIATED WITH TYPE 2 DIABETES MELLITUS, WITH FAT LAYER EXPOSED: Primary | ICD-10-CM

## 2021-12-17 PROCEDURE — 99499 UNLISTED E&M SERVICE: CPT | Mod: ,,, | Performed by: PODIATRIST

## 2021-12-17 PROCEDURE — 99499 NO LOS: ICD-10-PCS | Mod: ,,, | Performed by: PODIATRIST

## 2021-12-17 PROCEDURE — 11042 DBRDMT SUBQ TIS 1ST 20SQCM/<: CPT | Mod: ,,, | Performed by: PODIATRIST

## 2021-12-17 PROCEDURE — 11042 WOUND DEBRIDEMENT: ICD-10-PCS | Mod: ,,, | Performed by: PODIATRIST

## 2021-12-17 PROCEDURE — 99213 OFFICE O/P EST LOW 20 MIN: CPT | Performed by: PODIATRIST

## 2021-12-17 RX ORDER — CEFADROXIL 500 MG/1
500 CAPSULE ORAL 2 TIMES DAILY
Qty: 20 CAPSULE | Refills: 0 | Status: SHIPPED | OUTPATIENT
Start: 2021-12-17 | End: 2021-12-27

## 2021-12-21 ENCOUNTER — HOSPITAL ENCOUNTER (OUTPATIENT)
Dept: WOUND CARE | Facility: HOSPITAL | Age: 51
Discharge: HOME | End: 2021-12-21
Attending: PODIATRIST
Payer: MEDICARE

## 2021-12-21 PROCEDURE — 29581 APPL MULTLAYER CMPRN SYS LEG: CPT

## 2021-12-23 ENCOUNTER — HOSPITAL ENCOUNTER (OUTPATIENT)
Dept: WOUND CARE | Facility: HOSPITAL | Age: 51
Discharge: HOME | End: 2021-12-23
Attending: FAMILY MEDICINE
Payer: MEDICARE

## 2021-12-23 VITALS — SYSTOLIC BLOOD PRESSURE: 150 MMHG | TEMPERATURE: 98 F | HEART RATE: 80 BPM | DIASTOLIC BLOOD PRESSURE: 80 MMHG

## 2021-12-23 DIAGNOSIS — E11.621 DIABETIC ULCER OF LEFT FOOT: Primary | ICD-10-CM

## 2021-12-23 DIAGNOSIS — L97.529 DIABETIC ULCER OF LEFT FOOT: Primary | ICD-10-CM

## 2021-12-23 PROCEDURE — 99214 PR OFFICE/OUTPT VISIT, EST, LEVL IV, 30-39 MIN: ICD-10-PCS | Mod: ,,, | Performed by: FAMILY MEDICINE

## 2021-12-23 PROCEDURE — 99214 OFFICE O/P EST MOD 30 MIN: CPT | Mod: ,,, | Performed by: FAMILY MEDICINE

## 2021-12-23 PROCEDURE — 29581 APPL MULTLAYER CMPRN SYS LEG: CPT | Performed by: FAMILY MEDICINE

## 2021-12-28 ENCOUNTER — HOSPITAL ENCOUNTER (OUTPATIENT)
Dept: WOUND CARE | Facility: HOSPITAL | Age: 51
Discharge: HOME | End: 2021-12-28
Attending: PODIATRIST
Payer: MEDICARE

## 2021-12-28 VITALS — HEART RATE: 88 BPM | DIASTOLIC BLOOD PRESSURE: 87 MMHG | SYSTOLIC BLOOD PRESSURE: 147 MMHG | TEMPERATURE: 98 F

## 2021-12-28 DIAGNOSIS — L97.529 DIABETIC ULCER OF LEFT FOOT: Primary | ICD-10-CM

## 2021-12-28 DIAGNOSIS — E11.621 DIABETIC ULCER OF LEFT FOOT: Primary | ICD-10-CM

## 2021-12-28 PROCEDURE — 29581 APPL MULTLAYER CMPRN SYS LEG: CPT

## 2022-01-03 ENCOUNTER — HOSPITAL ENCOUNTER (OUTPATIENT)
Dept: WOUND CARE | Facility: HOSPITAL | Age: 52
Discharge: HOME | End: 2022-01-03
Attending: INTERNAL MEDICINE
Payer: MEDICARE

## 2022-01-03 VITALS
SYSTOLIC BLOOD PRESSURE: 182 MMHG | RESPIRATION RATE: 20 BRPM | TEMPERATURE: 98 F | HEART RATE: 81 BPM | DIASTOLIC BLOOD PRESSURE: 103 MMHG

## 2022-01-03 DIAGNOSIS — E11.621 DIABETIC ULCER OF LEFT FOOT: Primary | ICD-10-CM

## 2022-01-03 DIAGNOSIS — L97.529 DIABETIC ULCER OF LEFT FOOT: Primary | ICD-10-CM

## 2022-01-03 PROCEDURE — 29581 APPL MULTLAYER CMPRN SYS LEG: CPT

## 2022-01-03 NOTE — PROGRESS NOTES
[facility]  [place]  [id number]  Nurse Visit    Subjective:       Patient seen in clinic today.  Dressing changed as ordered.      Assessment:          Wound 10/25/21 1500 Diabetic Ulcer Left midline;plantar Foot (Active)   10/25/21 1500    Pre-existing: Yes   Primary Wound Type: Diabetic ulc   Side: Left   Orientation: midline;plantar   Location: Foot   Wound Number:    Ankle-Brachial Index:    Pulses:    Removal Indication and Assessment:    Wound Outcome:    (Retired) Wound Type:    (Retired) Wound Length (cm):    (Retired) Wound Width (cm):    (Retired) Depth (cm):    Wound Description (Comments):    Removal Indications:    Wound WDL ex 01/03/22 1400   Dressing Appearance Intact;Moist drainage 01/03/22 1400   Drainage Amount Large 01/03/22 1400   Drainage Characteristics/Odor Serosanguineous 01/03/22 1400   Appearance Red 01/03/22 1400   Tissue loss description Full thickness 01/03/22 1400   Black (%), Wound Tissue Color 0 % 01/03/22 1400   Red (%), Wound Tissue Color 100 % 01/03/22 1400   Yellow (%), Wound Tissue Color 0 % 01/03/22 1400   Periwound Area Macerated 01/03/22 1400   Wound Edges Irregular;Defined 01/03/22 1400   Care Cleansed with:;Antimicrobial agent;Soap and water;Sterile normal saline 01/03/22 1400   Dressing Changed 01/03/22 1400   Periwound Care Moisture barrier applied 01/03/22 1400   Compression Two layer compression 01/03/22 1400   Dressing Change Due 01/07/22 01/03/22 1400           Plan:     No orders of the defined types were placed in this encounter.          Follow up in about 4 days (around 1/7/2022) for wound care.

## 2022-01-07 ENCOUNTER — HOSPITAL ENCOUNTER (OUTPATIENT)
Dept: WOUND CARE | Facility: HOSPITAL | Age: 52
Discharge: HOME | End: 2022-01-07
Attending: PODIATRIST
Payer: MEDICARE

## 2022-01-07 VITALS — TEMPERATURE: 98 F | HEART RATE: 80 BPM | SYSTOLIC BLOOD PRESSURE: 176 MMHG | DIASTOLIC BLOOD PRESSURE: 95 MMHG

## 2022-01-07 DIAGNOSIS — L97.529 DIABETIC ULCER OF LEFT FOOT: Primary | ICD-10-CM

## 2022-01-07 DIAGNOSIS — E11.621 DIABETIC ULCER OF LEFT MIDFOOT ASSOCIATED WITH TYPE 2 DIABETES MELLITUS, WITH FAT LAYER EXPOSED: ICD-10-CM

## 2022-01-07 DIAGNOSIS — E11.621 DIABETIC ULCER OF LEFT FOOT: Primary | ICD-10-CM

## 2022-01-07 DIAGNOSIS — L97.422 DIABETIC ULCER OF LEFT MIDFOOT ASSOCIATED WITH TYPE 2 DIABETES MELLITUS, WITH FAT LAYER EXPOSED: ICD-10-CM

## 2022-01-07 PROCEDURE — 11042 DBRDMT SUBQ TIS 1ST 20SQCM/<: CPT | Mod: ,,, | Performed by: PODIATRIST

## 2022-01-07 PROCEDURE — 27201912 HC WOUND CARE DEBRIDEMENT SUPPLIES: Performed by: PODIATRIST

## 2022-01-07 PROCEDURE — 11042 WOUND DEBRIDEMENT: ICD-10-PCS | Mod: ,,, | Performed by: PODIATRIST

## 2022-01-07 PROCEDURE — 99499 NO LOS: ICD-10-PCS | Mod: ,,, | Performed by: PODIATRIST

## 2022-01-07 PROCEDURE — 99499 UNLISTED E&M SERVICE: CPT | Mod: ,,, | Performed by: PODIATRIST

## 2022-01-07 PROCEDURE — 11042 DBRDMT SUBQ TIS 1ST 20SQCM/<: CPT | Performed by: PODIATRIST

## 2022-01-07 NOTE — PROGRESS NOTES
[facility]                Progress Note    Subjective:       Patient ID: Indio Ryder Jr. is a 51 y.o. male.    Chief Complaint: No chief complaint on file.    Follow up wound care visit. Patient ambulated to exam room without assistance, prescribed CAM boot on LLE. No c/o pain at present. Dressing intact, with small amount of serosanguineous fluid to outer layer of Cob[place] (Zinc Coflex).       Review of Systems   Constitutional: Negative for activity change, appetite change, chills, fatigue and fever.   HENT: Negative for hearing loss.    Eyes: Negative for photophobia and visual disturbance.   Respiratory: Negative for cough, chest tightness, shortness of breath and wheezing.    Cardiovascular: Positive for leg swelling. Negative for chest pain and palpitations.   Gastrointestinal: Negative for constipation, diarrhea, nausea and vomiting.   Endocrine: Negative for cold intolerance and heat intolerance.   Genitourinary: Negative for flank pain.   Musculoskeletal: Positive for gait problem and myalgias. Negative for neck pain and neck stiffness.   Skin: Positive for wound. Negative for color change.   Neurological: Positive for numbness. Negative for weakness, light-headedness and headaches.        + paresthesia    Psychiatric/Behavioral: Negative for sleep disturbance.         Objective:        Physical Exam  Vitals reviewed.   Constitutional:       Appearance: He is well-developed.   Cardiovascular:      Comments: dorsalis pedis and posterior tibial pulses are palpable bilaterally. Capillary refill time is within normal limits. + pedal hair growth         Musculoskeletal:         General: No tenderness. Normal range of motion.      Comments: Normal angle, base, station of gait. All toes without clubbing, cyanosis, or signs of ischemia.  No pain to palpation bilateral lower extremities.  Range of motion, stability, muscle strength, and muscle tone normal bilateral  feet and legs.    Skin:     General: Skin is warm and dry.      Coloration: Skin is not ashen or cyanotic.      Findings: Wound (see below) present. No rash.      Nails: There is no clubbing.   Neurological:      Mental Status: He is alert and oriented to person, place, and time.      Sensory: No sensory deficit.      Comments: Diminished/loss of protective sensation all toes bilateral to 10 gram monofilament.     Psychiatric:         Behavior: Behavior normal.           Vitals:    01/07/22 1113   BP: (!) 176/95   Pulse: 80   Temp: 97.8 °F (36.6 °C)       Assessment:           ICD-10-CM ICD-9-CM   1. Diabetic ulcer of left foot  E11.621 250.80    L97.529 707.15   2. Diabetic ulcer of left midfoot associated with type 2 diabetes mellitus, with fat layer exposed  E11.621 250.80    L97.422 707.14            Wound 10/25/21 1500 Diabetic Ulcer Left midline;plantar Foot (Active)   10/25/21 1500    Pre-existing: Yes   Primary Wound Type: Diabetic ulc   Side: Left   Orientation: midline;plantar   Location: Foot   Wound Number:    Ankle-Brachial Index:    Pulses:    Removal Indication and Assessment:    Wound Outcome:    (Retired) Wound Type:    (Retired) Wound Length (cm):    (Retired) Wound Width (cm):    (Retired) Depth (cm):    Wound Description (Comments):    Removal Indications:    Wound Image    01/07/22 1100   Wound WDL ex 01/07/22 1100   Dressing Appearance Intact;Moist drainage 01/07/22 1100   Drainage Amount Large 01/07/22 1100   Drainage Characteristics/Odor Serosanguineous;Malodorous 01/07/22 1100   Appearance Red;Moist 01/07/22 1100   Tissue loss description Full thickness 01/07/22 1100   Red (%), Wound Tissue Color 100 % 01/07/22 1100   Periwound Area Macerated;Moist;Pale white 01/07/22 1100   Wound Edges Irregular;Defined 01/07/22 1100   Wound Length (cm) 3.4 cm 01/07/22 1100   Wound Width (cm) 2 cm 01/07/22 1100   Wound Depth (cm) 0.3 cm 01/07/22 1100   Wound Volume (cm^3) 2.04 cm^3 01/07/22 1100   Wound  "Surface Area (cm^2) 6.8 cm^2 01/07/22 1100   Care Cleansed with:;Wound cleanser;Sterile normal saline 01/07/22 1100   Dressing Changed 01/07/22 1100   Periwound Care Moisture barrier applied 01/07/22 1100   Compression Two layer compression 01/07/22 1100   Dressing Change Due 01/11/22 01/07/22 1100           Plan:            Left Plantar wound measuring smaller in length (0.5 cm), same in width, small island of skin to center of wound bed.       Wound Debridement    Date/Time: 1/7/2022 11:00 AM  Performed by: Marivel Peterson DPM  Authorized by: Marivel Peterson DPM     Time out: Immediately prior to procedure a "time out" was called to verify the correct patient, procedure, equipment, support staff and site/side marked as required.    Consent Done?:  Yes (Written)    Preparation: Patient was prepped and draped in usual sterile fashion    Local anesthesia used?: No      Wound Details:    Location:  Left foot    Location:  Left Midfoot    Type of Debridement:  Excisional       Length (cm):  3.4       Area (sq cm):  6.8       Width (cm):  2       Percent Debrided (%):  100       Depth (cm):  0.3       Total Area Debrided (sq cm):  6.8    Depth of debridement:  Subcutaneous tissue    Tissue debrided:  Dermis, Epidermis and Subcutaneous    Devitalized tissue debrided:  Callus, Biofilm and Fibrin    Instruments:  Curette    Bleeding:  Minimal  Hemostasis Achieved: Yes    Method Used:  Pressure  Patient tolerance:  Patient tolerated the procedure well with no immediate complications        Orders Placed This Encounter   Procedures    Change dressing     Dakin's soak, pat dry. Cavilon/Gentian violet periwound. Aperature felt pad to plantar foot with small hole over wound bed. Apply [place] (Iodoflex N/A) to wound bed. Cover with Drawtex cut-to-fit x 2, Mextra short x1 secured with Medipore tape. Football dressing (cast padding x 3), Coflex Zinc. CAM Boot.     Change during nurse visit.        Follow up in about 4 days (around " 1/11/2022) for wound care.

## 2022-01-11 ENCOUNTER — HOSPITAL ENCOUNTER (OUTPATIENT)
Dept: WOUND CARE | Facility: HOSPITAL | Age: 52
Discharge: HOME | End: 2022-01-11
Attending: FAMILY MEDICINE
Payer: MEDICARE

## 2022-01-11 VITALS — DIASTOLIC BLOOD PRESSURE: 95 MMHG | HEART RATE: 92 BPM | SYSTOLIC BLOOD PRESSURE: 168 MMHG | TEMPERATURE: 98 F

## 2022-01-11 DIAGNOSIS — E11.621 DIABETIC ULCER OF LEFT MIDFOOT ASSOCIATED WITH TYPE 2 DIABETES MELLITUS, WITH FAT LAYER EXPOSED: Primary | ICD-10-CM

## 2022-01-11 DIAGNOSIS — L97.422 DIABETIC ULCER OF LEFT MIDFOOT ASSOCIATED WITH TYPE 2 DIABETES MELLITUS, WITH FAT LAYER EXPOSED: Primary | ICD-10-CM

## 2022-01-11 PROCEDURE — 29581 APPL MULTLAYER CMPRN SYS LEG: CPT

## 2022-01-11 NOTE — PROGRESS NOTES
[facility]  [address] [place]  [address]  Nurse Visit    Subjective:       Patient seen in clinic today.  Dressing changed as ordered. Used [place] on wound bed (Iodoflex/sorb still unavailable).      Assessment:          Wound 10/25/21 1500 Diabetic Ulcer Left midline;plantar Foot (Active)   10/25/21 1500    Pre-existing: Yes   Primary Wound Type: Diabetic ulc   Side: Left   Orientation: midline;plantar   Location: Foot   Wound Number:    Ankle-Brachial Index:    Pulses:    Removal Indication and Assessment:    Wound Outcome:    (Retired) Wound Type:    (Retired) Wound Length (cm):    (Retired) Wound Width (cm):    (Retired) Depth (cm):    Wound Description (Comments):    Removal Indications:    Wound WDL ex 01/11/22 1600   Dressing Appearance Intact;Moist drainage 01/11/22 1600   Drainage Amount Moderate 01/11/22 1600   Drainage Characteristics/Odor Serous;Yellow 01/11/22 1600   Appearance Red 01/11/22 1600   Tissue loss description Full thickness 01/11/22 1600   Red (%), Wound Tissue Color 100 % 01/11/22 1600   Periwound Area Macerated;Pale white 01/11/22 1600   Wound Edges Irregular;Defined 01/11/22 1600   Care Cleansed with:;Antimicrobial agent;Sterile normal saline 01/11/22 1600   Dressing Changed 01/11/22 1600   Periwound Care Moisture barrier applied 01/11/22 1600   Compression Two layer compression 01/11/22 1600   Off Loading Football dressing;Off loading shoe 01/11/22 1600   Dressing Change Due 01/14/22 01/11/22 1600           Plan:     No orders of the defined types were placed in this encounter.          Follow up in about 3 days (around 1/14/2022) for wound care.

## 2022-01-12 DIAGNOSIS — Z01.818 PRE-OP TESTING: ICD-10-CM

## 2022-01-14 ENCOUNTER — HOSPITAL ENCOUNTER (OUTPATIENT)
Dept: WOUND CARE | Facility: HOSPITAL | Age: 52
Discharge: HOME | End: 2022-01-14
Attending: PODIATRIST
Payer: MEDICARE

## 2022-01-14 VITALS — DIASTOLIC BLOOD PRESSURE: 99 MMHG | HEART RATE: 78 BPM | SYSTOLIC BLOOD PRESSURE: 175 MMHG

## 2022-01-14 DIAGNOSIS — E11.621 DIABETIC ULCER OF LEFT MIDFOOT ASSOCIATED WITH TYPE 2 DIABETES MELLITUS, WITH FAT LAYER EXPOSED: Primary | ICD-10-CM

## 2022-01-14 DIAGNOSIS — L97.422 DIABETIC ULCER OF LEFT MIDFOOT ASSOCIATED WITH TYPE 2 DIABETES MELLITUS, WITH FAT LAYER EXPOSED: Primary | ICD-10-CM

## 2022-01-14 PROCEDURE — 99214 OFFICE O/P EST MOD 30 MIN: CPT | Mod: ,,, | Performed by: PODIATRIST

## 2022-01-14 PROCEDURE — 99214 PR OFFICE/OUTPT VISIT, EST, LEVL IV, 30-39 MIN: ICD-10-PCS | Mod: ,,, | Performed by: PODIATRIST

## 2022-01-14 PROCEDURE — 99213 OFFICE O/P EST LOW 20 MIN: CPT | Performed by: PODIATRIST

## 2022-01-14 PROCEDURE — 29581 APPL MULTLAYER CMPRN SYS LEG: CPT | Performed by: PODIATRIST

## 2022-01-14 NOTE — PROGRESS NOTES
[facility]                Progress Note    Subjective:       Patient ID: Indio Ryder Jr. is a 51 y.o. male.    Chief Complaint: No chief complaint on file.    F/u wound care visit. Patient ambulatory to exam room with cam boot on, no difficulty noted. Patient denies pain at present. Wound dressing to left plantar foot intact with moderate amount of drainage noted.       Review of Systems   Constitutional: Negative for activity change, appetite change, chills, fatigue and fever.   HENT: Negative for hearing loss.    Eyes: Negative for photophobia and visual disturbance.   Respiratory: Negative for cough, chest tightness, shortness of breath and wheezing.    Cardiovascular: Positive for leg swelling. Negative for chest pain and palpitations.   Gastrointestinal: Negative for constipation, diarrhea, nausea and vomiting.   Endocrine: Negative for cold intolerance and heat intolerance.   Genitourinary: Negative for flank pain.   Musculoskeletal: Positive for gait problem and myalgias. Negative for neck pain and neck stiffness.   Skin: Positive for wound. Negative for color change.   Neurological: Positive for numbness. Negative for weakness, light-headedness and headaches.        + paresthesia    Psychiatric/Behavioral: Negative for sleep disturbance.         Objective:        Physical Exam  Vitals reviewed.   Constitutional:       Appearance: He is well-developed.   Cardiovascular:      Comments: dorsalis pedis and posterior tibial pulses are palpable bilaterally. Capillary refill time is within normal limits. + pedal hair growth         Musculoskeletal:         General: No tenderness. Normal range of motion.      Comments: Normal angle, base, station of gait. All toes without clubbing, cyanosis, or signs of ischemia.  No pain to palpation bilateral lower extremities.  Range of motion, stability, muscle strength, and muscle tone normal bilateral feet and legs.     Skin:     General: Skin is warm and dry.      Coloration: Skin is not ashen or cyanotic.      Findings: Wound (see below) present. No rash.      Nails: There is no clubbing.   Neurological:      Mental Status: He is alert and oriented to person, place, and time.      Sensory: No sensory deficit.      Comments: Diminished/loss of protective sensation all toes bilateral to 10 gram monofilament.     Psychiatric:         Behavior: Behavior normal.         Vitals:    01/14/22 1127   BP: (!) 175/99   Pulse: 78   Resp: (P) 20   Temp: (P) 97.7 °F (36.5 °C)       Assessment:           ICD-10-CM ICD-9-CM   1. Diabetic ulcer of left midfoot associated with type 2 diabetes mellitus, with fat layer exposed  E11.621 250.80    L97.422 707.14            Wound 10/25/21 1500 Diabetic Ulcer Left midline;plantar Foot (Active)   10/25/21 1500    Pre-existing: Yes   Primary Wound Type: Diabetic ulc   Side: Left   Orientation: midline;plantar   Location: Foot   Wound Number:    Ankle-Brachial Index:    Pulses:    Removal Indication and Assessment:    Wound Outcome:    (Retired) Wound Type:    (Retired) Wound Length (cm):    (Retired) Wound Width (cm):    (Retired) Depth (cm):    Wound Description (Comments):    Removal Indications:    Wound Image   01/14/22 1100   Wound WDL ex 01/14/22 1100   Dressing Appearance Intact;Moist drainage 01/14/22 1100   Drainage Amount Moderate 01/14/22 1100   Drainage Characteristics/Odor Serosanguineous 01/14/22 1100   Appearance Red 01/14/22 1100   Tissue loss description Full thickness 01/14/22 1100   Black (%), Wound Tissue Color 0 % 01/14/22 1100   Red (%), Wound Tissue Color 100 % 01/14/22 1100   Yellow (%), Wound Tissue Color 0 % 01/14/22 1100   Periwound Area Macerated 01/14/22 1100   Wound Edges Defined 01/14/22 1100   Wound Length (cm) 3.4 cm 01/14/22 1100   Wound Width (cm) 2 cm 01/14/22 1100   Wound Depth (cm) 0.3 cm 01/14/22 1100   Wound Volume (cm^3) 2.04 cm^3 01/14/22 1100   Wound Surface  Area (cm^2) 6.8 cm^2 01/14/22 1100   Care Cleansed with:;Antimicrobial agent;Soap and water;Sterile normal saline 01/14/22 1100   Dressing Changed 01/14/22 1100   Periwound Care Moisture barrier applied 01/14/22 1100   Compression Two layer compression 01/14/22 1100   Off Loading Football dressing;Off loading shoe 01/14/22 1100   Dressing Change Due 01/19/22 01/14/22 1100           Plan:              Wound measurements unchanged but appearance is improving, increased granulating tissue noted. Wound care done per order. RTC on Wednesday 1/19/2022 for nurse visit and in one week for MD visit.           Orders Placed This Encounter   Procedures    Change dressing     Dakin's soak, pat dry. Cavilon/Gentian violet periwound. Aperature felt pad to plantar foot with small hole over wound bed. Apply [place] (Iodoflex N/A) to wound bed. Cover with Drawtex cut-to-fit x 2, Mextra short x1 secured with Medipore tape. Football dressing (cast padding x 3), Coflex Zinc. CAM Boot.     Change during nurse visit.        Follow up in about 5 days (around 1/19/2022) for nurse visit.

## 2022-01-15 ENCOUNTER — LAB VISIT (OUTPATIENT)
Dept: PRIMARY CARE CLINIC | Facility: CLINIC | Age: 52
End: 2022-01-15
Payer: MEDICARE

## 2022-01-15 DIAGNOSIS — Z01.818 PRE-OP TESTING: ICD-10-CM

## 2022-01-15 PROCEDURE — U0005 INFEC AGEN DETEC AMPLI PROBE: HCPCS | Performed by: CLINICAL NURSE SPECIALIST

## 2022-01-15 PROCEDURE — U0003 INFECTIOUS AGENT DETECTION BY NUCLEIC ACID (DNA OR RNA); SEVERE ACUTE RESPIRATORY SYNDROME CORONAVIRUS 2 (SARS-COV-2) (CORONAVIRUS DISEASE [COVID-19]), AMPLIFIED PROBE TECHNIQUE, MAKING USE OF HIGH THROUGHPUT TECHNOLOGIES AS DESCRIBED BY CMS-2020-01-R: HCPCS | Performed by: CLINICAL NURSE SPECIALIST

## 2022-01-16 ENCOUNTER — PATIENT MESSAGE (OUTPATIENT)
Dept: ENDOSCOPY | Facility: HOSPITAL | Age: 52
End: 2022-01-16
Payer: MEDICARE

## 2022-01-16 LAB
SARS-COV-2 RNA RESP QL NAA+PROBE: NOT DETECTED
SARS-COV-2- CYCLE NUMBER: NORMAL

## 2022-01-18 ENCOUNTER — ANESTHESIA EVENT (OUTPATIENT)
Dept: ENDOSCOPY | Facility: HOSPITAL | Age: 52
End: 2022-01-18
Payer: MEDICARE

## 2022-01-18 ENCOUNTER — HOSPITAL ENCOUNTER (OUTPATIENT)
Facility: HOSPITAL | Age: 52
Discharge: HOME | End: 2022-01-18
Attending: COLON & RECTAL SURGERY | Admitting: COLON & RECTAL SURGERY
Payer: MEDICARE

## 2022-01-18 ENCOUNTER — ANESTHESIA (OUTPATIENT)
Dept: ENDOSCOPY | Facility: HOSPITAL | Age: 52
End: 2022-01-18
Payer: MEDICARE

## 2022-01-18 VITALS
SYSTOLIC BLOOD PRESSURE: 147 MMHG | RESPIRATION RATE: 14 BRPM | WEIGHT: 220 LBS | OXYGEN SATURATION: 97 % | DIASTOLIC BLOOD PRESSURE: 85 MMHG | HEART RATE: 84 BPM | TEMPERATURE: 98 F | HEIGHT: 73 IN | BODY MASS INDEX: 29.16 KG/M2

## 2022-01-18 DIAGNOSIS — Z12.11 COLON CANCER SCREENING: Primary | ICD-10-CM

## 2022-01-18 DIAGNOSIS — Z01.818 PRE-OP TESTING: Primary | ICD-10-CM

## 2022-01-18 DIAGNOSIS — Z12.11 SCREEN FOR COLON CANCER: Primary | ICD-10-CM

## 2022-01-18 DIAGNOSIS — Z12.11 ENCOUNTER FOR SCREENING COLONOSCOPY: Primary | ICD-10-CM

## 2022-01-18 LAB — POCT GLUCOSE: 263 MG/DL (ref 70–110)

## 2022-01-18 PROCEDURE — 25000003 PHARM REV CODE 250: Performed by: NURSE ANESTHETIST, CERTIFIED REGISTERED

## 2022-01-18 PROCEDURE — 37000008 HC ANESTHESIA 1ST 15 MINUTES: Performed by: COLON & RECTAL SURGERY

## 2022-01-18 PROCEDURE — 25000003 PHARM REV CODE 250: Performed by: COLON & RECTAL SURGERY

## 2022-01-18 PROCEDURE — G0121 COLON CA SCRN NOT HI RSK IND: HCPCS | Performed by: COLON & RECTAL SURGERY

## 2022-01-18 PROCEDURE — E9220 PRA ENDO ANESTHESIA: ICD-10-PCS | Mod: PT,,, | Performed by: NURSE ANESTHETIST, CERTIFIED REGISTERED

## 2022-01-18 PROCEDURE — E9220 PRA ENDO ANESTHESIA: HCPCS | Mod: PT,,, | Performed by: NURSE ANESTHETIST, CERTIFIED REGISTERED

## 2022-01-18 PROCEDURE — G0121 COLON CA SCRN NOT HI RSK IND: ICD-10-PCS | Mod: 53,,, | Performed by: COLON & RECTAL SURGERY

## 2022-01-18 PROCEDURE — G0121 COLON CA SCRN NOT HI RSK IND: HCPCS | Mod: 53,,, | Performed by: COLON & RECTAL SURGERY

## 2022-01-18 PROCEDURE — 37000009 HC ANESTHESIA EA ADD 15 MINS: Performed by: COLON & RECTAL SURGERY

## 2022-01-18 PROCEDURE — 63600175 PHARM REV CODE 636 W HCPCS: Performed by: NURSE ANESTHETIST, CERTIFIED REGISTERED

## 2022-01-18 RX ORDER — LIDOCAINE HYDROCHLORIDE 20 MG/ML
INJECTION INTRAVENOUS
Status: DISCONTINUED | OUTPATIENT
Start: 2022-01-18 | End: 2022-01-18

## 2022-01-18 RX ORDER — POLYETHYLENE GLYCOL 3350, SODIUM SULFATE ANHYDROUS, SODIUM BICARBONATE, SODIUM CHLORIDE, POTASSIUM CHLORIDE 236; 22.74; 6.74; 5.86; 2.97 G/4L; G/4L; G/4L; G/4L; G/4L
4 POWDER, FOR SOLUTION ORAL ONCE
Qty: 4000 ML | Refills: 0 | Status: SHIPPED | OUTPATIENT
Start: 2022-01-18 | End: 2023-09-11 | Stop reason: SDUPTHER

## 2022-01-18 RX ORDER — SODIUM CHLORIDE 9 MG/ML
INJECTION, SOLUTION INTRAVENOUS CONTINUOUS
Status: DISCONTINUED | OUTPATIENT
Start: 2022-01-18 | End: 2022-01-18 | Stop reason: HOSPADM

## 2022-01-18 RX ORDER — PROPOFOL 10 MG/ML
VIAL (ML) INTRAVENOUS
Status: DISCONTINUED | OUTPATIENT
Start: 2022-01-18 | End: 2022-01-18

## 2022-01-18 RX ORDER — PROPOFOL 10 MG/ML
VIAL (ML) INTRAVENOUS CONTINUOUS PRN
Status: DISCONTINUED | OUTPATIENT
Start: 2022-01-18 | End: 2022-01-18

## 2022-01-18 RX ADMIN — Medication 150 MCG/KG/MIN: at 07:01

## 2022-01-18 RX ADMIN — PROPOFOL 70 MG: 10 INJECTION, EMULSION INTRAVENOUS at 07:01

## 2022-01-18 RX ADMIN — LIDOCAINE HYDROCHLORIDE 60 MG: 20 INJECTION, SOLUTION INTRAVENOUS at 07:01

## 2022-01-18 RX ADMIN — SODIUM CHLORIDE: 0.9 INJECTION, SOLUTION INTRAVENOUS at 07:01

## 2022-01-18 NOTE — ANESTHESIA PREPROCEDURE EVALUATION
01/18/2022  Indio Ryder Jr. is a 51 y.o. male presenting for a colonoscopy. His past medical history includes HTN, poorly controlled DM2, DKA with coma, diabetic neuropathy, diabatic retinopathy, diabetic foot with multiple toe amputations, and subacute osteomyelitis of right foot. ICU admission March 2020 with COVID-19; patient complains of shortness of breath on exertion since this admission.       Past Medical History:   Diagnosis Date    Actinomyces infection 1/17/2017    Right foot    Diabetic ketoacidosis without coma associated with type 2 diabetes mellitus 5/30/2017    Diabetic ulcer of right foot associated with type 2 diabetes mellitus 6/3/2015    Disorder of kidney and ureter     Essential hypertension 6/6/2013    Group B streptococcal infection 1/13/2017    Mixed hyperlipidemia 8/12/2014    Septic arthritis of left foot 4/28/2021    Shoulder impingement 8/12/2014    Subacute osteomyelitis of right foot 1/12/2017    Streptococcus agalactiae, Actinomyces odontolyticus    Traumatic amputation of fifth toe of right foot 07/02/2015    Type 2 diabetes mellitus with diabetic neuropathy, with long-term current use of insulin 5/3/2016    Ulcerative colitis, unspecified      Past Surgical History:   Procedure Laterality Date    DEBRIDEMENT OF FOOT Left 7/14/2019    Procedure: DEBRIDEMENT, FOOT, with left 5th ray amputation;  Surgeon: Sis Hickman DPM;  Location: [address];  Service: Podiatry;  Laterality: Left;    DEBRIDEMENT OF FOOT Left 7/17/2019    Procedure: DEBRIDEMENT, FOOT with leftt 5th ray partial amputation with Neox Graft;  Surgeon: Mai Burrell DPM;  Location: [address];  Service: Podiatry;  Laterality: Left;  t    DEBRIDEMENT OF FOOT Bilateral 4/29/2021    Procedure: DEBRIDEMENT, FOOT;  Surgeon: Mai Burrell DPM;  Location[facility];  Service: Podiatry;  Laterality: Bilateral;  Graft application    TOE AMPUTATION Right 06/05/2015    TOE AMPUTATION Right 01/19/2017       Transthoracic echo (TTE) complete 3/13/2020  Summary  · Normal left ventricular systolic function. The estimated ejection fraction is 55%.  · No wall motion abnormalities.  · Normal LV diastolic function.  · Mild left atrial enlargement.  · Normal right ventricular systolic function.  · Normal central venous pressure (3 mmHg).       Anesthesia Evaluation    I have reviewed the Patient Summary Reports.      I have reviewed the Medications.     Review of Systems  Anesthesia Hx:  No problems with previous Anesthesia Denies Hx of Anesthetic complications  Denies Family Hx of Anesthesia complications.   Denies Personal Hx of Anesthesia complications.   Cardiovascular:   Hypertension    Renal/:   Denies Chronic Renal Disease.     Hepatic/GI:   Bowel Prep. Denies Liver Disease.    Musculoskeletal:   Denies Arthritis.     Endocrine:   Diabetes, poorly controlled, type 2        Physical Exam  General:  Well nourished    Airway/Jaw/Neck:  Airway Findings: Mouth Opening: Normal Tongue: Normal  General Airway Assessment: Adult  Mallampati: II  TM Distance: Normal, at least 6 cm  Jaw/Neck Findings:  Neck ROM: Normal ROM     Eyes/Ears/Nose:  EYES/EARS/NOSE FINDINGS: Normal   Dental:  Dental Findings: In tact   Chest/Lungs:  Chest/Lungs Findings: Clear to auscultation, Normal Respiratory Rate     Heart/Vascular:  Heart Findings: Rate: Normal  Rhythm: Regular Rhythm  Sounds: Normal        Mental Status:  Mental Status Findings:  Cooperative, Alert and Oriented         Anesthesia Plan  Type of Anesthesia, risks & benefits discussed:  Anesthesia Type:  general    Patient's Preference: general  Plan Factors:          Intra-op Monitoring Plan: standard ASA monitors  Intra-op Monitoring Plan Comments:   Post Op Pain Control Plan:   Post Op Pain Control Plan Comments:     Induction:    IV  Beta Blocker:  Patient is not currently on a Beta-Blocker (No further documentation required).       Informed Consent: Patient understands risks and agrees with Anesthesia plan.  Questions answered. Anesthesia consent signed with patient.  ASA Score: 2     Day of Surgery Review of History & Physical: I have interviewed and examined the patient. I have reviewed the patient's H&P dated: 1/18/22.   H&P update referred to the provider.         Ready For Surgery From Anesthesia Perspective.

## 2022-01-18 NOTE — H&P
Procedure : Colonoscopy    Indication(s):  asymptomatic screening exam    Review of patient's allergies indicates:  No Known Allergies    Past Medical History:   Diagnosis Date    Actinomyces infection 1/17/2017    Right foot    Diabetic ketoacidosis without coma associated with type 2 diabetes mellitus 5/30/2017    Diabetic ulcer of right foot associated with type 2 diabetes mellitus 6/3/2015    Disorder of kidney and ureter     Essential hypertension 6/6/2013    Group B streptococcal infection 1/13/2017    Mixed hyperlipidemia 8/12/2014    Septic arthritis of left foot 4/28/2021    Shoulder impingement 8/12/2014    Subacute osteomyelitis of right foot 1/12/2017    Streptococcus agalactiae, Actinomyces odontolyticus    Traumatic amputation of fifth toe of right foot 07/02/2015    Type 2 diabetes mellitus with diabetic neuropathy, with long-term current use of insulin 5/3/2016    Ulcerative colitis, unspecified        Prior to Admission medications    Medication Sig Start Date End Date Taking? Authorizing Provider   acetaminophen (TYLENOL) 325 MG tablet Take 2 tablets (650 mg total) by mouth every 6 (six) hours as needed. 5/1/21   Billie Justin MD   atorvastatin (LIPITOR) 80 MG tablet Take 1 tablet (80 mg total) by mouth once daily.  Patient not taking: No sig reported 12/4/20 12/4/21  Miryam Scruggs MD   dulaglutide (TRULICITY) 1.5 mg/0.5 mL pen injector Inject 1.5 mg into the skin every 7 days. 8/26/21   JESUS Bernardo, RONNY   insulin aspart U-100 (NOVOLOG) 100 unit/mL (3 mL) InPn pen Inject 8 units before meals plus scale 150-200+2, 201-250+4, 251-300+6, 301-350+8, >350+10. Max daily 54 units. 8/26/21   JESUS Bernardo FNP   insulin detemir U-100 (LEVEMIR FLEXTOUCH) 100 unit/mL (3 mL) SubQ InPn pen Inject 24 Units into the skin every evening.  Patient not taking: No sig reported 8/26/21 8/26/22  JESUS Bernardo, RONNY   lisinopriL (PRINIVIL,ZESTRIL) 5 MG tablet Take 1  "tablet (5 mg total) by mouth once daily. 9/27/21 9/27/22  Lorena Thakur MD   metFORMIN (GLUCOPHAGE) 500 MG tablet Take 2 tablets by mouth in the morning, and take 2 tablets by mouth at night.  Patient taking differently: Take 2 tablets by mouth in the morning, and take 2 tablets by mouth at night. 7/19/21   JESUS Bernardo, RONNY   ONETOUCH DELICA LANCETS 33 gauge Misc  5/4/17   Historical Provider   ONETOUCH ULTRA2 kit  3/29/17   Historical Provider   pen needle, diabetic (BD SASCHA 2ND GEN PEN NEEDLE) 32 gauge x 5/32" Ndle Use 4 times a day  Patient taking differently: Use 4 times a day 8/26/21   JESUS Bernardo, RONNY   permethrin (ELIMITE) 5 % cream Apply neck down at night for 1 application. Wash off in morning and Repeat in 1 week  Patient not taking: No sig reported 8/7/20   Riley Thompson MD       Sedation Problems: NO    Family History   Adopted: Yes   Problem Relation Age of Onset    Hypertension Mother     Cancer Mother         breast    Diabetes Mother     Hypertension Father     Cataracts Father     Heart disease Father         mi    Diabetes Sister     No Known Problems Brother     Heart disease Sister         MI    No Known Problems Sister     Hypertension Maternal Aunt     No Known Problems Maternal Uncle     No Known Problems Paternal Aunt     No Known Problems Paternal Uncle     No Known Problems Maternal Grandmother     No Known Problems Maternal Grandfather     No Known Problems Paternal Grandmother     No Known Problems Paternal Grandfather     Amblyopia Neg Hx     Blindness Neg Hx     Glaucoma Neg Hx     Macular degeneration Neg Hx     Retinal detachment Neg Hx     Strabismus Neg Hx     Stroke Neg Hx     Thyroid disease Neg Hx        Fam Hx of Sedation Problems: NO    Social History     Socioeconomic History    Marital status: Single    Number of children: 0   Occupational History    Occupation: Retired     Employer: [employer]   Tobacco Use    " Smoking status: Never Smoker    Smokeless tobacco: Never Used   Substance and Sexual Activity    Alcohol use: No    Drug use: No    Sexual activity: Yes     Partners: Female     Birth control/protection: Condom       Review of Systems -     Respiratory ROS: no cough, shortness of breath, or wheezing  Cardiovascular ROS: no chest pain or dyspnea on exertion  Gastrointestinal ROS: no abdominal pain, change in bowel habits, or black or bloody stools  Musculoskeletal ROS: negative  Neurological ROS: no TIA or stroke symptoms        Physical Exam:  General: no distress  Head: normocephalic  Airway:  normal oropharynx, airway normal  Neck: supple, symmetrical, trachea midline  Lungs:  normal respiratory effort  Heart: regular rate and rhythm  Abdomen: soft, non-tender non-distented; bowel sounds normal; no masses,  no organomegaly  Extremities: no cyanosis or edema, or clubbing       Deep Sedation: Mallampati Score per anesthesia     SedationPlan :Moderate     ASA : III    Patient is medically cleared for anesthesia.    Anesthesia/Surgery risks, benefits and alternative options discussed and understood by patient/family.

## 2022-01-18 NOTE — PROVATION PATIENT INSTRUCTIONS
Discharge Summary/Instructions after an Endoscopic Procedure  Patient Name: Indio Ryder  Patient MRN: [id number]  Patient YOB: 1970 Tuesday, January 18, 2022  Samuel Moraes MD  Dear patient,  As a result of recent federal legislation (The Federal Cures Act), you may   receive lab or pathology results from your procedure in your [facility]   account before your physician is able to contact you. Your physician or   their representative will relay the results to you with their   recommendations at their soonest availability.  Thank you,  RESTRICTIONS:  During your procedure today, you received medications for sedation.  These   medications may affect your judgment, balance and coordination.  Therefore,   for 24 hours, you have the following restrictions:   - DO NOT drive a car, operate machinery, make legal/financial decisions,   sign important papers or drink alcohol.    ACTIVITY:  Today: no heavy lifting, straining or running due to procedural   sedation/anesthesia.  The following day: return to full activity including work.  DIET:  Eat and drink normally unless instructed otherwise.     TREATMENT FOR COMMON SIDE EFFECTS:  - Mild abdominal pain, nausea, belching, bloating or excessive gas:  rest,   eat lightly and use a heating pad.  - Sore Throat: treat with throat lozenges and/or gargle with warm salt   water.  - Because air was used during the procedure, expelling large amounts of air   from your rectum or belching is normal.  - If a bowel prep was taken, you may not have a bowel movement for 1-3 days.    This is normal.  SYMPTOMS TO WATCH FOR AND REPORT TO YOUR PHYSICIAN:  1. Abdominal pain or bloating, other than gas cramps.  2. Chest pain.  3. Back pain.  4. Signs of infection such as: chills or fever occurring within 24 hours   after the procedure.  5. Rectal bleeding, which would show as bright red, maroon, or black stools.   (A tablespoon of blood from the rectum is not serious, especially  if   hemorrhoids are present.)  6. Vomiting.  7. Weakness or dizziness.  GO DIRECTLY TO THE NEAREST EMERGENCY ROOM IF YOU HAVE ANY OF THE FOLLOWING:      Difficulty breathing              Chills and/or fever over 101 F   Persistent vomiting and/or vomiting blood   Severe abdominal pain   Severe chest pain   Black, tarry stools   Bleeding- more than one tablespoon   Any other symptom or condition that you feel may need urgent attention  Your doctor recommends these additional instructions:  If any biopsies were taken, your doctors clinic will contact you in 1 to 2   weeks with any results.  - Discharge patient to home.   - Resume previous diet.   - Continue present medications.   - Patient has a contact number available for emergencies.  The signs and   symptoms of potential delayed complications were discussed with the   patient.  Return to normal activities tomorrow.  Written discharge   instructions were provided to the patient.   - Repeat colonoscopy at the next available appointment because the bowel   preparation was poor.  For questions, problems or results please call your physician - Samuel Moraes MD at Work:  [phone].  [facility] [place], EMERGENCY ROOM PHONE NUMBER: [phone]  IF A COMPLICATION OR EMERGENCY SITUATION ARISES AND YOU ARE UNABLE TO REACH   YOUR PHYSICIAN - GO DIRECTLY TO THE EMERGENCY ROOM.  Samuel Moraes MD  1/18/2022 7:52:04 AM  This report has been verified and signed electronically.  Dear patient,  As a result of recent federal legislation (The Federal Cures Act), you may   receive lab or pathology results from your procedure in your [facility]   account before your physician is able to contact you. Your physician or   their representative will relay the results to you with their   recommendations at their soonest availability.  Thank you,  PROVATION

## 2022-01-18 NOTE — ANESTHESIA POSTPROCEDURE EVALUATION
Anesthesia Post Evaluation    Patient: Indio Ryder Jr.    Procedure(s) Performed: Procedure(s) (LRB):  COLONOSCOPY (N/A)    Final Anesthesia Type: general      Patient location during evaluation: PACU  Patient participation: Yes- Able to Participate  Level of consciousness: awake and alert, awake and oriented  Post-procedure vital signs: reviewed and stable  Pain management: adequate  Airway patency: patent    PONV status at discharge: No PONV  Anesthetic complications: no      Cardiovascular status: blood pressure returned to baseline, stable and hemodynamically stable  Respiratory status: unassisted, spontaneous ventilation and room air  Hydration status: euvolemic  Follow-up not needed.          Vitals Value Taken Time   /85 01/18/22 0825   Temp 36.7 °C (98.1 °F) 01/18/22 0825   Pulse 84 01/18/22 0825   Resp 14 01/18/22 0825   SpO2 97 % 01/18/22 0825         Event Time   Out of Recovery 08:27:14         Pain/Jenny Score: Jenny Score: 10 (1/18/2022  8:27 AM)

## 2022-01-18 NOTE — PLAN OF CARE
Pt is alert,VSS, instructions reviewed with the pt,the pt will meet with a  to repeat colonoscopy tomorrow due to poor prep result.

## 2022-01-18 NOTE — TRANSFER OF CARE
"Anesthesia Transfer of Care Note    Patient: Indio Ryder Jr.    Procedure(s) Performed: Procedure(s) (LRB):  COLONOSCOPY (N/A)    Patient location: [facility]    Anesthesia Type: general    Transport from OR: Transported from OR on 2-3 L/min O2 by NC with adequate spontaneous ventilation    Post pain: adequate analgesia    Post assessment: no apparent anesthetic complications and tolerated procedure well    Post vital signs: stable    Level of consciousness: sedated    Nausea/Vomiting: no nausea/vomiting    Complications: none    Transfer of care protocol was followed      Last vitals:   Visit Vitals  /70   Pulse 80   Temp 36.7 °C (98.1 °F) (Temporal)   Resp 14   Ht 6' 1" (1.854 m)   Wt 99.8 kg (220 lb)   SpO2 99%   BMI 29.03 kg/m²     "

## 2022-01-19 ENCOUNTER — PATIENT MESSAGE (OUTPATIENT)
Dept: ADMINISTRATIVE | Facility: HOSPITAL | Age: 52
End: 2022-01-19
Payer: MEDICARE

## 2022-01-19 ENCOUNTER — ANESTHESIA (OUTPATIENT)
Dept: ENDOSCOPY | Facility: HOSPITAL | Age: 52
End: 2022-01-19
Payer: MEDICARE

## 2022-01-19 ENCOUNTER — ANESTHESIA EVENT (OUTPATIENT)
Dept: ENDOSCOPY | Facility: HOSPITAL | Age: 52
End: 2022-01-19
Payer: MEDICARE

## 2022-01-19 ENCOUNTER — HOSPITAL ENCOUNTER (OUTPATIENT)
Facility: HOSPITAL | Age: 52
Discharge: HOME | End: 2022-01-19
Attending: INTERNAL MEDICINE | Admitting: INTERNAL MEDICINE
Payer: MEDICARE

## 2022-01-19 ENCOUNTER — TELEPHONE (OUTPATIENT)
Dept: ENDOSCOPY | Facility: HOSPITAL | Age: 52
End: 2022-01-19
Payer: MEDICARE

## 2022-01-19 VITALS
HEIGHT: 73 IN | HEART RATE: 83 BPM | SYSTOLIC BLOOD PRESSURE: 111 MMHG | WEIGHT: 220 LBS | TEMPERATURE: 98 F | BODY MASS INDEX: 29.16 KG/M2 | OXYGEN SATURATION: 97 % | DIASTOLIC BLOOD PRESSURE: 57 MMHG | RESPIRATION RATE: 18 BRPM

## 2022-01-19 DIAGNOSIS — Z12.11 COLON CANCER SCREENING: Primary | ICD-10-CM

## 2022-01-19 DIAGNOSIS — K63.5 POLYP OF COLON, UNSPECIFIED PART OF COLON, UNSPECIFIED TYPE: Primary | ICD-10-CM

## 2022-01-19 LAB
CTP QC/QA: YES
POCT GLUCOSE: 168 MG/DL (ref 70–110)
SARS-COV-2 AG RESP QL IA.RAPID: NEGATIVE

## 2022-01-19 PROCEDURE — 25000003 PHARM REV CODE 250: Performed by: NURSE ANESTHETIST, CERTIFIED REGISTERED

## 2022-01-19 PROCEDURE — G0121 COLON CA SCRN NOT HI RSK IND: HCPCS | Performed by: INTERNAL MEDICINE

## 2022-01-19 PROCEDURE — G0121 COLON CA SCRN NOT HI RSK IND: ICD-10-PCS | Mod: ,,, | Performed by: INTERNAL MEDICINE

## 2022-01-19 PROCEDURE — 37000009 HC ANESTHESIA EA ADD 15 MINS: Performed by: INTERNAL MEDICINE

## 2022-01-19 PROCEDURE — 63600175 PHARM REV CODE 636 W HCPCS: Performed by: NURSE ANESTHETIST, CERTIFIED REGISTERED

## 2022-01-19 PROCEDURE — G0121 COLON CA SCRN NOT HI RSK IND: HCPCS | Mod: ,,, | Performed by: INTERNAL MEDICINE

## 2022-01-19 PROCEDURE — 27202363 HC INJECTION AGENT, SUBMUCOSAL, ANY: Performed by: INTERNAL MEDICINE

## 2022-01-19 PROCEDURE — 37000008 HC ANESTHESIA 1ST 15 MINUTES: Performed by: INTERNAL MEDICINE

## 2022-01-19 PROCEDURE — 27201028 HC NEEDLE, SCLERO: Performed by: INTERNAL MEDICINE

## 2022-01-19 PROCEDURE — E9220 PRA ENDO ANESTHESIA: ICD-10-PCS | Mod: PT,,, | Performed by: NURSE ANESTHETIST, CERTIFIED REGISTERED

## 2022-01-19 PROCEDURE — E9220 PRA ENDO ANESTHESIA: HCPCS | Mod: PT,,, | Performed by: NURSE ANESTHETIST, CERTIFIED REGISTERED

## 2022-01-19 RX ORDER — PROPOFOL 10 MG/ML
VIAL (ML) INTRAVENOUS
Status: DISCONTINUED | OUTPATIENT
Start: 2022-01-19 | End: 2022-01-19

## 2022-01-19 RX ORDER — PROPOFOL 10 MG/ML
VIAL (ML) INTRAVENOUS CONTINUOUS PRN
Status: DISCONTINUED | OUTPATIENT
Start: 2022-01-19 | End: 2022-01-19

## 2022-01-19 RX ORDER — LIDOCAINE HCL/PF 100 MG/5ML
SYRINGE (ML) INTRAVENOUS
Status: DISCONTINUED | OUTPATIENT
Start: 2022-01-19 | End: 2022-01-19

## 2022-01-19 RX ORDER — PHENYLEPHRINE HYDROCHLORIDE 10 MG/ML
INJECTION INTRAVENOUS
Status: DISCONTINUED | OUTPATIENT
Start: 2022-01-19 | End: 2022-01-19

## 2022-01-19 RX ORDER — SODIUM CHLORIDE 9 MG/ML
INJECTION, SOLUTION INTRAVENOUS CONTINUOUS
Status: DISCONTINUED | OUTPATIENT
Start: 2022-01-19 | End: 2022-01-19 | Stop reason: HOSPADM

## 2022-01-19 RX ADMIN — PHENYLEPHRINE HYDROCHLORIDE 200 MCG: 10 INJECTION INTRAVENOUS at 02:01

## 2022-01-19 RX ADMIN — SODIUM CHLORIDE: 0.9 INJECTION, SOLUTION INTRAVENOUS at 01:01

## 2022-01-19 RX ADMIN — PHENYLEPHRINE HYDROCHLORIDE 150 MCG: 10 INJECTION INTRAVENOUS at 02:01

## 2022-01-19 RX ADMIN — PHENYLEPHRINE HYDROCHLORIDE 100 MCG: 10 INJECTION INTRAVENOUS at 02:01

## 2022-01-19 RX ADMIN — PROPOFOL 80 MG: 10 INJECTION, EMULSION INTRAVENOUS at 02:01

## 2022-01-19 RX ADMIN — PHENYLEPHRINE HYDROCHLORIDE 50 MCG: 10 INJECTION INTRAVENOUS at 02:01

## 2022-01-19 RX ADMIN — PROPOFOL 150 MCG/KG/MIN: 10 INJECTION, EMULSION INTRAVENOUS at 02:01

## 2022-01-19 RX ADMIN — Medication 100 MG: at 02:01

## 2022-01-19 NOTE — H&P
Short Stay Endoscopy History and Physical    PCP - Lorena Thakur MD    Procedure - Colonoscopy  ASA - per anesthesia  Mallampati - per anesthesia  History of Anesthesia problems - no  Family history Anesthesia problems - no   Plan of anesthesia - General, MAC    HPI:  This is a 51 y.o. male here for evaluation of : asymptomatic screening exam      ROS:  Constitutional: No fevers, chills, No weight loss  CV: No chest pain  Pulm: No cough, No shortness of breath  GI: see HPI  Derm: No rash    Medical History:  has a past medical history of Actinomyces infection (1/17/2017), Diabetic ketoacidosis without coma associated with type 2 diabetes mellitus (5/30/2017), Diabetic ulcer of right foot associated with type 2 diabetes mellitus (6/3/2015), Disorder of kidney and ureter, Essential hypertension (6/6/2013), Group B streptococcal infection (1/13/2017), Mixed hyperlipidemia (8/12/2014), Septic arthritis of left foot (4/28/2021), Shoulder impingement (8/12/2014), Subacute osteomyelitis of right foot (1/12/2017), Traumatic amputation of fifth toe of right foot (07/02/2015), Type 2 diabetes mellitus with diabetic neuropathy, with long-term current use of insulin (5/3/2016), and Ulcerative colitis, unspecified.    Surgical History:  has a past surgical history that includes Toe amputation (Right, 06/05/2015); Toe amputation (Right, 01/19/2017); Debridement of foot (Left, 7/14/2019); Debridement of foot (Left, 7/17/2019); and Debridement of foot (Bilateral, 4/29/2021).    Family History: family history includes Cancer in his mother; Cataracts in his father; Diabetes in his mother and sister; Heart disease in his father and sister; Hypertension in his father, maternal aunt, and mother; No Known Problems in his brother, maternal grandfather, maternal grandmother, maternal uncle, paternal aunt, paternal grandfather, paternal grandmother, paternal uncle, and sister. He was adopted.. Otherwise no colon cancer, inflammatory  "bowel disease, or GI malignancies.    Social History:  reports that he has never smoked. He has never used smokeless tobacco. He reports that he does not drink alcohol and does not use drugs.    Review of patient's allergies indicates:  No Known Allergies    Medications:   Facility-Administered Medications Prior to Admission   Medication Dose Route Frequency Provider Last Rate Last Admin    heparin, porcine (PF) 100 unit/mL injection flush 10 Units  10 Units Intravenous PRN Esthela Diggs MD   500 Units at 05/19/21 1308     Medications Prior to Admission   Medication Sig Dispense Refill Last Dose    acetaminophen (TYLENOL) 325 MG tablet Take 2 tablets (650 mg total) by mouth every 6 (six) hours as needed. 30 tablet 0     atorvastatin (LIPITOR) 80 MG tablet Take 1 tablet (80 mg total) by mouth once daily. (Patient not taking: No sig reported) 90 tablet 0     dulaglutide (TRULICITY) 1.5 mg/0.5 mL pen injector Inject 1.5 mg into the skin every 7 days. 4 pen 5     insulin aspart U-100 (NOVOLOG) 100 unit/mL (3 mL) InPn pen Inject 8 units before meals plus scale 150-200+2, 201-250+4, 251-300+6, 301-350+8, >350+10. Max daily 54 units. 15 mL 6     insulin detemir U-100 (LEVEMIR FLEXTOUCH) 100 unit/mL (3 mL) SubQ InPn pen Inject 24 Units into the skin every evening. (Patient not taking: No sig reported) 15 mL 6     lisinopriL (PRINIVIL,ZESTRIL) 5 MG tablet Take 1 tablet (5 mg total) by mouth once daily. 90 tablet 3     metFORMIN (GLUCOPHAGE) 500 MG tablet Take 2 tablets by mouth in the morning, and take 2 tablets by mouth at night. (Patient taking differently: Take 2 tablets by mouth in the morning, and take 2 tablets by mouth at night.) 360 tablet 3     ONETOUCH DELICA LANCETS 33 gauge Misc        ONETOUCH ULTRA2 kit        pen needle, diabetic (BD SASCHA 2ND GEN PEN NEEDLE) 32 gauge x 5/32" Ndle Use 4 times a day (Patient taking differently: Use 4 times a day) 400 each 3     permethrin (ELIMITE) 5 % cream " Apply neck down at night for 1 application. Wash off in morning and Repeat in 1 week (Patient not taking: No sig reported) 60 g 1     polyethylene glycol (GOLYTELY,NULYTELY) 236-22.74-6.74 -5.86 gram suspension Take 4,000 mLs (4 L total) by mouth once. for 1 dose 4000 mL 0          Physical Exam:    Vital Signs: There were no vitals filed for this visit.    Gen: NAD, lying comfortably  HENT: NCAT, oropharynx clear  Eyes: anicteric sclerae, EOMI grossly  Neck: supple, no visible masses/goiter  Cardiac: RRR  Lungs: non-labored breathing  Abd: soft, NT/ND, normoactive BS  Ext: no LE edema, warm, well perfused  Skin: skin intact on exposed body parts, no visible rashes, lesions  Neuro: A&Ox4, neuro exam grossly intact, moves all extremities  Psych: appropriate mood, affect        Labs:  Lab Results   Component Value Date    WBC 5.16 09/15/2021    HGB 12.4 (L) 09/15/2021    HCT 40.6 09/15/2021     09/15/2021    CHOL 100 (L) 07/14/2021    TRIG 61 07/14/2021    HDL 35 (L) 07/14/2021    ALT 19 09/15/2021    AST 18 09/15/2021     09/15/2021    K 3.9 09/15/2021     09/15/2021    CREATININE 1.0 09/15/2021    BUN 11 09/15/2021    CO2 26 09/15/2021    TSH 1.136 03/01/2012    PSA 0.65 08/11/2014    INR 1.1 01/16/2017    HGBA1C 8.1 (H) 09/15/2021       Plan:  Colonoscopy for CRC screening    I have explained the risks and benefits of endoscopy procedures to the patient including but not limited to bleeding, perforation, infection, and death.  The patient was asked if they understand and allowed to ask any further questions to their satisfaction.    Tj Haile MD

## 2022-01-19 NOTE — ANESTHESIA POSTPROCEDURE EVALUATION
Anesthesia Post Evaluation    Patient: Indio Ryder Jr.    Procedure(s) Performed: Procedure(s) (LRB):  COLONOSCOPY (Right)    Final Anesthesia Type: general      Patient location during evaluation: PACU  Patient participation: Yes- Able to Participate  Level of consciousness: awake and alert and oriented  Post-procedure vital signs: reviewed and stable  Pain management: adequate  Airway patency: patent  MAYELIN mitigation strategies: Intraoperative administration of CPAP, nasopharyngeal airway, or oral appliance during sedation and Multimodal analgesia  PONV status at discharge: No PONV  Anesthetic complications: no      Cardiovascular status: hemodynamically stable  Respiratory status: unassisted  Hydration status: euvolemic  Follow-up not needed.          Vitals Value Taken Time   /57 01/19/22 1536   Temp 36.7 °C (98.1 °F) 01/19/22 1500   Pulse 83 01/19/22 1536   Resp 18 01/19/22 1536   SpO2 97 % 01/19/22 1536         Event Time   Out of Recovery 15:41:39         Pain/Jenyn Score: Jenny Score: 10 (1/19/2022  3:00 PM)

## 2022-01-19 NOTE — ANESTHESIA PREPROCEDURE EVALUATION
01/19/2022  Indio Ryder Jr. is a 51 y.o. male presenting for a colonoscopy. His past medical history includes HTN, poorly controlled DM2, DKA with coma, diabetic neuropathy, diabatic retinopathy, diabetic foot with multiple toe amputations, and subacute osteomyelitis of right foot. ICU admission March 2020 with COVID-19; patient complains of shortness of breath on exertion since this admission.     Patient Active Problem List   Diagnosis    Essential hypertension    Mixed hyperlipidemia    Traumatic amputation of fifth toe of right foot    Type 2 diabetes mellitus with hyperglycemia, with long-term current use of insulin    Actinomyces infection    Hypokalemia    Neck pain    Diabetic ulcer of right midfoot associated with diabetes mellitus due to underlying condition, with fat layer exposed    Severe malnutrition    Hypertensive retinopathy, bilateral    Allergic reaction due to antibacterial drug    Chronic left shoulder pain    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 edema, with long-term current use of insulin    Diabetic ulcer of left foot    Septic arthritis of left foot    Acute on chronic osteomyelitis    Proteus infection       Past Medical History:   Diagnosis Date    Actinomyces infection 1/17/2017    Right foot    Diabetic ketoacidosis without coma associated with type 2 diabetes mellitus 5/30/2017    Diabetic ulcer of right foot associated with type 2 diabetes mellitus 6/3/2015    Disorder of kidney and ureter     Essential hypertension 6/6/2013    Group B streptococcal infection 1/13/2017    Mixed hyperlipidemia 8/12/2014    Septic arthritis of left foot 4/28/2021    Shoulder impingement 8/12/2014    Subacute osteomyelitis of right foot 1/12/2017    Streptococcus agalactiae, Actinomyces  odontolyticus    Traumatic amputation of fifth toe of right foot 07/02/2015    Type 2 diabetes mellitus with diabetic neuropathy, with long-term current use of insulin 5/3/2016    Ulcerative colitis, unspecified      Past Surgical History:   Procedure Laterality Date    DEBRIDEMENT OF FOOT Left 7/14/2019    Procedure: DEBRIDEMENT, FOOT, with left 5th ray amputation;  Surgeon: Sis Hickman DPM;  Location: [facility] OR [address] [place] [address];  Service: Podiatry;  Laterality: Left;    DEBRIDEMENT OF FOOT Left 7/17/2019    Procedure: DEBRIDEMENT, FOOT with leftt 5th ray partial amputation with Neox Graft;  Surgeon: Mai Burrell DPM;  Location: [facility] OR [address] [place] [address];  Service: Podiatry;  Laterality: Left;  t    DEBRIDEMENT OF FOOT Bilateral 4/29/2021    Procedure: DEBRIDEMENT, FOOT;  Surgeon: Mai Burrell DPM;  Location: [facility] OR [address] [place] [address];  Service: Podiatry;  Laterality: Bilateral;  Graft application    TOE AMPUTATION Right 06/05/2015    TOE AMPUTATION Right 01/19/2017       Transthoracic echo (TTE) complete 3/13/2020  Summary  · Normal left ventricular systolic function. The estimated ejection fraction is 55%.  · No wall motion abnormalities.  · Normal LV diastolic function.  · Mild left atrial enlargement.  · Normal right ventricular systolic function.  · Normal central venous pressure (3 mmHg).       Anesthesia Evaluation    I have reviewed the Patient Summary Reports.    I have reviewed the Nursing Notes. I have reviewed the NPO Status.   I have reviewed the Medications.     Review of Systems  Anesthesia Hx:  No problems with previous Anesthesia Denies Hx of Anesthetic complications  Denies Family Hx of Anesthesia complications.   Denies Personal Hx of Anesthesia complications.   Cardiovascular:   Hypertension    Renal/:   Denies Chronic Renal Disease.     Hepatic/GI:   Bowel Prep. Denies Liver Disease.    Musculoskeletal:   Denies Arthritis.     Endocrine:   Diabetes, poorly controlled, type 2         Physical Exam  General:  Well nourished    Airway/Jaw/Neck:  Airway Findings: Mouth Opening: Normal Tongue: Normal  General Airway Assessment: Adult  Mallampati: II  TM Distance: Normal, at least 6 cm  Jaw/Neck Findings:  Neck ROM: Normal ROM     Eyes/Ears/Nose:  EYES/EARS/NOSE FINDINGS: Normal   Dental:  Dental Findings: In tact   Chest/Lungs:  Chest/Lungs Findings: Clear to auscultation, Normal Respiratory Rate     Heart/Vascular:  Heart Findings: Rate: Normal  Rhythm: Regular Rhythm  Sounds: Normal        Mental Status:  Mental Status Findings:  Cooperative, Alert and Oriented         Anesthesia Plan  Type of Anesthesia, risks & benefits discussed:  Anesthesia Type:  general    Patient's Preference: general  Plan Factors:          Intra-op Monitoring Plan: standard ASA monitors  Intra-op Monitoring Plan Comments:   Post Op Pain Control Plan:   Post Op Pain Control Plan Comments:     Induction:   IV  Beta Blocker:  Patient is not currently on a Beta-Blocker (No further documentation required).       Informed Consent: Patient understands risks and agrees with Anesthesia plan.  Questions answered. Anesthesia consent signed with patient.  ASA Score: 2     Day of Surgery Review of History & Physical: I have interviewed and examined the patient. I have reviewed the patient's H&P dated: 1/18/22. There are no significant changes.  H&P update referred to the provider.         Ready For Surgery From Anesthesia Perspective.

## 2022-01-19 NOTE — [employer]ATION PATIENT INSTRUCTIONS
Discharge Summary/Instructions after an Endoscopic Procedure  Patient Name: Indio Ryder  Patient MRN: [id number]  Patient YOB: 1970 Wednesday, January 19, 2022  Tj Haile MD  Dear patient,  As a result of recent federal legislation (The Federal Cures Act), you may   receive lab or pathology results from your procedure in your [facility]   account before your physician is able to contact you. Your physician or   their representative will relay the results to you with their   recommendations at their soonest availability.  Thank you,  RESTRICTIONS:  During your procedure today, you received medications for sedation.  These   medications may affect your judgment, balance and coordination.  Therefore,   for 24 hours, you have the following restrictions:   - DO NOT drive a car, operate machinery, make legal/financial decisions,   sign important papers or drink alcohol.    ACTIVITY:  Today: no heavy lifting, straining or running due to procedural   sedation/anesthesia.  The following day: return to full activity including work.  DIET:  Eat and drink normally unless instructed otherwise.     TREATMENT FOR COMMON SIDE EFFECTS:  - Mild abdominal pain, nausea, belching, bloating or excessive gas:  rest,   eat lightly and use a heating pad.  - Sore Throat: treat with throat lozenges and/or gargle with warm salt   water.  - Because air was used during the procedure, expelling large amounts of air   from your rectum or belching is normal.  - If a bowel prep was taken, you may not have a bowel movement for 1-3 days.    This is normal.  SYMPTOMS TO WATCH FOR AND REPORT TO YOUR PHYSICIAN:  1. Abdominal pain or bloating, other than gas cramps.  2. Chest pain.  3. Back pain.  4. Signs of infection such as: chills or fever occurring within 24 hours   after the procedure.  5. Rectal bleeding, which would show as bright red, maroon, or black stools.   (A tablespoon of blood from the rectum is not serious, especially  if   hemorrhoids are present.)  6. Vomiting.  7. Weakness or dizziness.  GO DIRECTLY TO THE NEAREST EMERGENCY ROOM IF YOU HAVE ANY OF THE FOLLOWING:      Difficulty breathing              Chills and/or fever over 101 F   Persistent vomiting and/or vomiting blood   Severe abdominal pain   Severe chest pain   Black, tarry stools   Bleeding- more than one tablespoon   Any other symptom or condition that you feel may need urgent attention  Your doctor recommends these additional instructions:  If any biopsies were taken, your doctors clinic will contact you in 1 to 2   weeks with any results.  - Discharge patient to home.   - Resume previous diet.   - Continue present medications.   - Repeat colonoscopy in 1 month for hepatic flexure polyp removal with AES.   Case request placed.  For questions, problems or results please call your physician - Tj Haile MD at Work:  [phone].  [facility], EMERGENCY ROOM PHONE NUMBER: [phone]  IF A COMPLICATION OR EMERGENCY SITUATION ARISES AND YOU ARE UNABLE TO REACH   YOUR PHYSICIAN - GO DIRECTLY TO THE EMERGENCY ROOM.  Tj Haile MD  1/19/2022 3:01:59 PM  This report has been verified and signed electronically.  Dear patient,  As a result of recent federal legislation (The Federal Cures Act), you may   receive lab or pathology results from your procedure in your [facility]   account before your physician is able to contact you. Your physician or   their representative will relay the results to you with their   recommendations at their soonest availability.  Thank you,  [employer]ATION

## 2022-01-19 NOTE — TRANSFER OF CARE
"Anesthesia Transfer of Care Note    Patient: Indio Ryder Jr.    Procedure(s) Performed: Procedure(s) (LRB):  COLONOSCOPY (Right)    Patient location: GI    Anesthesia Type: general    Transport from OR: Transported from OR on room air with adequate spontaneous ventilation    Post pain: adequate analgesia    Post assessment: no apparent anesthetic complications and tolerated procedure well    Post vital signs: stable    Level of consciousness: sedated    Nausea/Vomiting: no nausea/vomiting    Complications: none    Transfer of care protocol was followed      Last vitals:   Visit Vitals  BP (!) 143/87 (BP Location: Left arm, Patient Position: Lying)   Pulse 83   Temp 36.6 °C (97.9 °F) (Temporal)   Resp 17   Ht 6' 1" (1.854 m)   Wt 99.8 kg (220 lb)   SpO2 98%   BMI 29.03 kg/m²     "

## 2022-01-20 ENCOUNTER — PATIENT OUTREACH (OUTPATIENT)
Dept: ADMINISTRATIVE | Facility: OTHER | Age: 52
End: 2022-01-20
Payer: MEDICARE

## 2022-01-20 NOTE — TELEPHONE ENCOUNTER
----- Message from Sheng Haque MD sent at 1/19/2022  5:11 PM CST -----  Need colonoscopy with EMR of a 15-20 mm hepatic flexure polyp. Main. 60 minutes. Referring: Tj Haile MD  Thanks,  Sheng Haque MD  ----- Message -----  From: Amy Adair MA  Sent: 1/19/2022   3:07 PM CST  To: Sheng Haque MD      ----- Message -----  From: Tj Haile MD  Sent: 1/19/2022   3:05 PM CST  To: WILNER Fowler,  Another patient with a large flat polyp that will need EMR with AES.  Case request placed.  He is in recovery now so probably better to call him later this week to schedule colonoscopy    Thanks so much  Tj

## 2022-01-21 ENCOUNTER — HOSPITAL ENCOUNTER (OUTPATIENT)
Dept: WOUND CARE | Facility: HOSPITAL | Age: 52
Discharge: HOME | End: 2022-01-21
Attending: PODIATRIST
Payer: MEDICARE

## 2022-01-21 VITALS — TEMPERATURE: 98 F | SYSTOLIC BLOOD PRESSURE: 199 MMHG | HEART RATE: 79 BPM | DIASTOLIC BLOOD PRESSURE: 101 MMHG

## 2022-01-21 DIAGNOSIS — L97.529 DIABETIC ULCER OF LEFT FOOT: Primary | ICD-10-CM

## 2022-01-21 DIAGNOSIS — E11.621 DIABETIC ULCER OF LEFT FOOT: Primary | ICD-10-CM

## 2022-01-21 PROCEDURE — 99214 OFFICE O/P EST MOD 30 MIN: CPT | Mod: ,,, | Performed by: FAMILY MEDICINE

## 2022-01-21 PROCEDURE — 29581 APPL MULTLAYER CMPRN SYS LEG: CPT

## 2022-01-21 PROCEDURE — 99214 PR OFFICE/OUTPT VISIT, EST, LEVL IV, 30-39 MIN: ICD-10-PCS | Mod: ,,, | Performed by: FAMILY MEDICINE

## 2022-01-21 NOTE — PROGRESS NOTES
[facility]                Progress Note    Subjective:       Patient ID: Indio Ryder Jr. is a 51 y.o. male.    Chief Complaint: No chief complaint on file.    Patient ambulated to exam room. No complaints of pain. Left Plantar Foot Diabetic Ulcer measuring smaller overall this week despite continued drainage. No changes to orders - will schedule another nurse visit as well. No debridement. Continue collagen.     BP elevated (taken x 2). Patient reports that he has not taken any of his BP meds yet today. States he will take when he gets back to his car after this visit.       Review of Systems   Constitutional: Negative.    HENT: Negative.    Eyes: Negative.    Respiratory: Negative.    Cardiovascular: Negative.    Musculoskeletal: Negative.    Skin: Positive for wound.   Neurological: Negative.    Hematological: Negative.    All other systems reviewed and are negative.        Objective:        Physical Exam  Vitals reviewed.   Constitutional:       Appearance: He is well-developed and well-nourished.   HENT:      Head: Normocephalic and atraumatic.   Eyes:      Extraocular Movements: EOM normal.      Conjunctiva/sclera: Conjunctivae normal.      Pupils: Pupils are equal, round, and reactive to light.   Musculoskeletal:      Cervical back: Normal range of motion.   Skin:     General: Skin is warm and dry.      Comments: Please see wound description   Neurological:      Mental Status: He is alert and oriented to person, place, and time.   Psychiatric:         Mood and Affect: Mood and affect normal.         Behavior: Behavior normal.         Vitals:    01/21/22 1133   BP: (!) 199/101   Pulse: 79   Temp:        Assessment:           ICD-10-CM ICD-9-CM   1. Diabetic ulcer of left foot  E11.621 250.80    L97.529 707.15            Wound 10/25/21 1500 Diabetic Ulcer Left midline;plantar Foot (Active)   10/25/21 1500    Pre-existing: Yes   Primary Wound Type: Diabetic ulc    Side: Left   Orientation: midline;plantar   Location: Foot   Wound Number:    Ankle-Brachial Index:    Pulses:    Removal Indication and Assessment:    Wound Outcome:    (Retired) Wound Type:    (Retired) Wound Length (cm):    (Retired) Wound Width (cm):    (Retired) Depth (cm):    Wound Description (Comments):    Removal Indications:    Wound Image   01/21/22 1100   Wound WDL ex 01/21/22 1100   Dressing Appearance Moist drainage;Dried drainage 01/21/22 1100   Drainage Amount Moderate 01/21/22 1100   Drainage Characteristics/Odor Serosanguineous 01/21/22 1100   Appearance Red 01/21/22 1100   Tissue loss description Full thickness 01/21/22 1100   Red (%), Wound Tissue Color 100 % 01/21/22 1100   Periwound Area Macerated 01/21/22 1100   Wound Edges Open 01/21/22 1100   Wound Length (cm) 2.9 cm 01/21/22 1100   Wound Width (cm) 1.6 cm 01/21/22 1100   Wound Depth (cm) 0.2 cm 01/21/22 1100   Wound Volume (cm^3) 0.928 cm^3 01/21/22 1100   Wound Surface Area (cm^2) 4.64 cm^2 01/21/22 1100   Care Cleansed with:;Soap and water;Sterile normal saline 01/21/22 1100   Dressing Changed 01/21/22 1100   Periwound Care Moisture barrier applied 01/21/22 1100   Compression Two layer compression 01/21/22 1100   Off Loading Football dressing;Off loading shoe 01/21/22 1100           Plan:            1. Debridement not needed and Not Done today.   2. Continue off-loading / leg elevation   3. Continue eating protein   4. Keep dressing clean and intact  5. Continue with wound care orders and plan as noted in orders.   6. Continue to follow current medication regimen as per pcp   7. Call for any questions / concerns.         Orders Placed This Encounter   Procedures    Change dressing     Clean with saline. Cavilon/Gentian violet periwound. Custom-cut felt pad to plantar foot with small hole over wound bed. Apply [place] to wound bed. Cover with Drawtex cut-to-fit x 2, Mextra short x1 secured with Medipore tape. Football dressing (cast  padding x 3), Coflex Zinc. CAM Boot. Change during nurse visit.        Follow up in about 4 days (around 1/25/2022) for wound care.

## 2022-01-25 ENCOUNTER — OFFICE VISIT (OUTPATIENT)
Dept: CARDIOLOGY | Facility: CLINIC | Age: 52
End: 2022-01-25
Payer: MEDICARE

## 2022-01-25 ENCOUNTER — HOSPITAL ENCOUNTER (OUTPATIENT)
Dept: WOUND CARE | Facility: HOSPITAL | Age: 52
Discharge: HOME | End: 2022-01-25
Attending: FAMILY MEDICINE
Payer: MEDICARE

## 2022-01-25 VITALS
TEMPERATURE: 98 F | HEIGHT: 73 IN | SYSTOLIC BLOOD PRESSURE: 150 MMHG | HEART RATE: 81 BPM | BODY MASS INDEX: 29.16 KG/M2 | SYSTOLIC BLOOD PRESSURE: 148 MMHG | DIASTOLIC BLOOD PRESSURE: 78 MMHG | HEART RATE: 87 BPM | WEIGHT: 220 LBS | DIASTOLIC BLOOD PRESSURE: 98 MMHG

## 2022-01-25 DIAGNOSIS — E11.621 DIABETIC ULCER OF LEFT MIDFOOT ASSOCIATED WITH TYPE 2 DIABETES MELLITUS, WITH FAT LAYER EXPOSED: ICD-10-CM

## 2022-01-25 DIAGNOSIS — S98.131S: ICD-10-CM

## 2022-01-25 DIAGNOSIS — L97.422 DIABETIC ULCER OF LEFT MIDFOOT ASSOCIATED WITH TYPE 2 DIABETES MELLITUS, WITH FAT LAYER EXPOSED: ICD-10-CM

## 2022-01-25 DIAGNOSIS — E11.65 TYPE 2 DIABETES MELLITUS WITH HYPERGLYCEMIA, WITH LONG-TERM CURRENT USE OF INSULIN: ICD-10-CM

## 2022-01-25 DIAGNOSIS — Z79.4 TYPE 2 DIABETES MELLITUS WITH HYPERGLYCEMIA, WITH LONG-TERM CURRENT USE OF INSULIN: ICD-10-CM

## 2022-01-25 DIAGNOSIS — I10 ESSENTIAL HYPERTENSION: Primary | ICD-10-CM

## 2022-01-25 DIAGNOSIS — E11.621 DIABETIC ULCER OF LEFT FOOT: Primary | ICD-10-CM

## 2022-01-25 DIAGNOSIS — E08.621 DIABETIC ULCER OF RIGHT MIDFOOT ASSOCIATED WITH DIABETES MELLITUS DUE TO UNDERLYING CONDITION, WITH FAT LAYER EXPOSED: ICD-10-CM

## 2022-01-25 DIAGNOSIS — E78.2 MIXED HYPERLIPIDEMIA: ICD-10-CM

## 2022-01-25 DIAGNOSIS — L97.529 DIABETIC ULCER OF LEFT FOOT: Primary | ICD-10-CM

## 2022-01-25 DIAGNOSIS — L97.412 DIABETIC ULCER OF RIGHT MIDFOOT ASSOCIATED WITH DIABETES MELLITUS DUE TO UNDERLYING CONDITION, WITH FAT LAYER EXPOSED: ICD-10-CM

## 2022-01-25 DIAGNOSIS — E87.6 HYPOKALEMIA: ICD-10-CM

## 2022-01-25 DIAGNOSIS — H35.033 HYPERTENSIVE RETINOPATHY, BILATERAL: ICD-10-CM

## 2022-01-25 DIAGNOSIS — E43 SEVERE MALNUTRITION: ICD-10-CM

## 2022-01-25 PROCEDURE — 99204 PR OFFICE/OUTPT VISIT, NEW, LEVL IV, 45-59 MIN: ICD-10-PCS | Mod: S$PBB,,, | Performed by: INTERNAL MEDICINE

## 2022-01-25 PROCEDURE — 29581 APPL MULTLAYER CMPRN SYS LEG: CPT

## 2022-01-25 PROCEDURE — 99204 OFFICE O/P NEW MOD 45 MIN: CPT | Mod: S$PBB,,, | Performed by: INTERNAL MEDICINE

## 2022-01-25 PROCEDURE — 99999 PR PBB SHADOW E&M-EST. PATIENT-LVL IV: CPT | Mod: PBBFAC,,, | Performed by: INTERNAL MEDICINE

## 2022-01-25 PROCEDURE — 99214 OFFICE O/P EST MOD 30 MIN: CPT | Mod: PBBFAC,25 | Performed by: INTERNAL MEDICINE

## 2022-01-25 PROCEDURE — 93010 EKG 12-LEAD: ICD-10-PCS | Mod: S$PBB,,, | Performed by: INTERNAL MEDICINE

## 2022-01-25 PROCEDURE — 93005 ELECTROCARDIOGRAM TRACING: CPT | Mod: 59,PBBFAC | Performed by: INTERNAL MEDICINE

## 2022-01-25 PROCEDURE — 93010 ELECTROCARDIOGRAM REPORT: CPT | Mod: S$PBB,,, | Performed by: INTERNAL MEDICINE

## 2022-01-25 PROCEDURE — 99999 PR PBB SHADOW E&M-EST. PATIENT-LVL IV: ICD-10-PCS | Mod: PBBFAC,,, | Performed by: INTERNAL MEDICINE

## 2022-01-25 RX ORDER — NAPROXEN SODIUM 220 MG/1
81 TABLET, FILM COATED ORAL DAILY
Qty: 90 TABLET | Refills: 3 | Status: SHIPPED | OUTPATIENT
Start: 2022-01-25 | End: 2022-06-06

## 2022-01-25 RX ORDER — CLOPIDOGREL BISULFATE 75 MG/1
75 TABLET ORAL DAILY
Qty: 30 TABLET | Refills: 11 | Status: SHIPPED | OUTPATIENT
Start: 2022-01-25 | End: 2022-10-14 | Stop reason: ALTCHOICE

## 2022-01-25 RX ORDER — LISINOPRIL 10 MG/1
10 TABLET ORAL DAILY
Qty: 90 TABLET | Refills: 3 | Status: SHIPPED | OUTPATIENT
Start: 2022-01-25 | End: 2022-02-22

## 2022-01-25 NOTE — PROGRESS NOTES
[facility] [address] [place]  [address]  Nurse Visit    Subjective:       Patient seen in clinic today.  Dressing changed as ordered.      Assessment:          Wound 10/25/21 1500 Diabetic Ulcer Left midline;plantar Foot (Active)   10/25/21 1500    Pre-existing: Yes   Primary Wound Type: Diabetic ulc   Side: Left   Orientation: midline;plantar   Location: Foot   Wound Number:    Ankle-Brachial Index:    Pulses:    Removal Indication and Assessment:    Wound Outcome:    (Retired) Wound Type:    (Retired) Wound Length (cm):    (Retired) Wound Width (cm):    (Retired) Depth (cm):    Wound Description (Comments):    Removal Indications:    Wound WDL ex 01/25/22 1400   Dressing Appearance Moist drainage 01/25/22 1400   Drainage Amount Moderate 01/25/22 1400   Drainage Characteristics/Odor Serosanguineous 01/25/22 1400   Appearance Red 01/25/22 1400   Tissue loss description Full thickness 01/25/22 1400   Red (%), Wound Tissue Color 100 % 01/25/22 1400   Periwound Area Macerated 01/25/22 1400   Wound Edges Open 01/25/22 1400   Care Cleansed with:;Antimicrobial agent;Sterile normal saline 01/25/22 1400   Dressing Changed 01/25/22 1400   Periwound Care Moisture barrier applied 01/25/22 1400   Compression Two layer compression 01/25/22 1400   Off Loading Football dressing;Off loading shoe 01/25/22 1400   Dressing Change Due 01/28/22 01/25/22 1400           Plan:     No orders of the defined types were placed in this encounter.          Follow up in about 3 days (around 1/28/2022).

## 2022-01-25 NOTE — PROGRESS NOTES
CARDIOVASCULAR CONSULTATION    REASON FOR CONSULT:   Indio Ryder Jr. is a 51 y.o. male who presents for evaluation.      HISTORY OF PRESENT ILLNESS:     Patient is a pleasant 51-year-old man.  Has been referred from Podiatry for further evaluation of severe peripheral vascular disease.  Holter wound bandage.  States wound is not wound.  Denies any chest pains at rest exertion,      PAST MEDICAL HISTORY:     Past Medical History:   Diagnosis Date    Actinomyces infection 1/17/2017    Right foot    Diabetic ketoacidosis without coma associated with type 2 diabetes mellitus 5/30/2017    Diabetic ulcer of right foot associated with type 2 diabetes mellitus 6/3/2015    Disorder of kidney and ureter     Essential hypertension 6/6/2013    Group B streptococcal infection 1/13/2017    Mixed hyperlipidemia 8/12/2014    Septic arthritis of left foot 4/28/2021    Shoulder impingement 8/12/2014    Subacute osteomyelitis of right foot 1/12/2017    Streptococcus agalactiae, Actinomyces odontolyticus    Traumatic amputation of fifth toe of right foot 07/02/2015    Type 2 diabetes mellitus with diabetic neuropathy, with long-term current use of insulin 5/3/2016    Ulcerative colitis, unspecified        PAST SURGICAL HISTORY:     Past Surgical History:   Procedure Laterality Date    COLONOSCOPY Right 1/19/2022    Procedure: COLONOSCOPY;  Surgeon: Tj Haile MD;  Location: [facility] (4TH FLR);  Service: Endoscopy;  Laterality: Right;  previous order un-usable/poor prep 1/18/22  RAPID COVID test arrival 12:20  instructions handed to pt- sm    DEBRIDEMENT OF FOOT Left 7/14/2019    Procedure: DEBRIDEMENT, FOOT, with left 5th ray amputation;  Surgeon: Sis Hickman DPM;  Location: [facility] OR 2ND FLR;  Service: Podiatry;  Laterality: Left;    DEBRIDEMENT OF FOOT Left 7/17/2019    Procedure: DEBRIDEMENT, FOOT with leftt 5th ray partial amputation with Neox Graft;  Surgeon: Mai Burrell DPM;  Location: [facility]  "OR 2ND FLR;  Service: Podiatry;  Laterality: Left;  t    DEBRIDEMENT OF FOOT Bilateral 4/29/2021    Procedure: DEBRIDEMENT, FOOT;  Surgeon: Mai Burrell DPM;  Location: [facility] OR 1ST FLR;  Service: Podiatry;  Laterality: Bilateral;  Graft application    TOE AMPUTATION Right 06/05/2015    TOE AMPUTATION Right 01/19/2017       ALLERGIES AND MEDICATION:   Review of patient's allergies indicates:  No Known Allergies     Medication List          Accurate as of January 25, 2022  9:27 PM. If you have any questions, ask your nurse or doctor.            START taking these medications    aspirin 81 MG Chew  Chew and swallow 1 tablet (81 mg total) by mouth once daily.  Started by: Marshall Stahl MD     clopidogreL 75 mg tablet  Commonly known as: PLAVIX  Take 1 tablet (75 mg total) by mouth once daily.  Started by: Marshall Stahl MD        CHANGE how you take these medications    lisinopriL 10 MG tablet  Take 1 tablet (10 mg total) by mouth once daily.  What changed:   · medication strength  · how much to take  Changed by: Marshall Stahl MD        CONTINUE taking these medications    acetaminophen 325 MG tablet  Commonly known as: TYLENOL  Take 2 tablets (650 mg total) by mouth every 6 (six) hours as needed.     atorvastatin 80 MG tablet  Commonly known as: LIPITOR  Take 1 tablet (80 mg total) by mouth once daily.     BD ULTRA-FINE SASCHA PEN NEEDLE 32 gauge x 5/32" Ndle  Generic drug: pen needle, diabetic  Use 4 times a day     insulin aspart U-100 100 unit/mL (3 mL) Inpn pen  Commonly known as: NovoLOG  Inject 8 units before meals plus scale 150-200+2, 201-250+4, 251-300+6, 301-350+8, >350+10. Max daily 54 units.     insulin detemir U-100 100 unit/mL (3 mL) Inpn pen  Commonly known as: Levemir FLEXTOUCH  Inject 24 Units into the skin every evening.     metFORMIN 500 MG tablet  Commonly known as: GLUCOPHAGE  Take 2 tablets by mouth in the morning, and take 2 tablets by mouth at night.     ONETOUCH DELICA LANCETS 33 " gauge Misc  Generic drug: lancets     ONETOUCH ULTRA2 METER kit  Generic drug: blood-glucose meter     permethrin 5 % cream  Commonly known as: ELIMITE  Apply neck down at night for 1 application. Wash off in morning and Repeat in 1 week     TRULICITY 1.5 mg/0.5 mL pen injector  Generic drug: dulaglutide  Inject 1.5 mg into the skin every 7 days.           Where to Get Your Medications      These medications were sent to [facility] [id number] [id number]    Hours: Mon-Fri, 8a-5:30p Phone: [phone]   · aspirin 81 MG Chew  · clopidogreL 75 mg tablet  · lisinopriL 10 MG tablet         SOCIAL HISTORY:     Social History     Socioeconomic History    Marital status: Single    Number of children: 0   Occupational History    Occupation: Retired     Employer: [employer]   Tobacco Use    Smoking status: Never Smoker    Smokeless tobacco: Never Used   Substance and Sexual Activity    Alcohol use: No    Drug use: No    Sexual activity: Yes     Partners: Female     Birth control/protection: Condom       FAMILY HISTORY:     Family History   Adopted: Yes   Problem Relation Age of Onset    Hypertension Mother     Cancer Mother         breast    Diabetes Mother     Hypertension Father     Cataracts Father     Heart disease Father         mi    Diabetes Sister     No Known Problems Brother     Heart disease Sister         MI    No Known Problems Sister     Hypertension Maternal Aunt     No Known Problems Maternal Uncle     No Known Problems Paternal Aunt     No Known Problems Paternal Uncle     No Known Problems Maternal Grandmother     No Known Problems Maternal Grandfather     No Known Problems Paternal Grandmother     No Known Problems Paternal Grandfather     Amblyopia Neg Hx     Blindness Neg Hx     Glaucoma Neg Hx     Macular degeneration Neg Hx     Retinal detachment Neg Hx     Strabismus Neg Hx     Stroke Neg Hx     Thyroid disease Neg Hx   "      REVIEW OF SYSTEMS:   Review of Systems   Constitutional: Negative.   HENT: Negative.    Eyes: Negative.    Cardiovascular: Negative.    Respiratory: Negative.    Endocrine: Negative.    Hematologic/Lymphatic: Negative.    Skin: Positive for color change and poor wound healing.   Musculoskeletal: Negative.    Gastrointestinal: Negative.    Genitourinary: Negative.    Neurological: Negative.    Psychiatric/Behavioral: Negative.    Allergic/Immunologic: Negative.        A 10 point review of systems was performed and all the pertinent positives have been mentioned. Rest of review of systems was negative.        PHYSICAL EXAM:     Vitals:    01/25/22 1455   BP: (!) 150/98   Pulse: 81    Body mass index is 29.03 kg/m².  Weight: 99.8 kg (220 lb)   Height: 6' 1" (185.4 cm)     Physical Exam  Vitals reviewed.   Constitutional:       Appearance: He is well-developed and well-nourished.   HENT:      Head: Normocephalic.   Eyes:      Conjunctiva/sclera: Conjunctivae normal.      Pupils: Pupils are equal, round, and reactive to light.   Cardiovascular:      Rate and Rhythm: Normal rate and regular rhythm.      Heart sounds: Normal heart sounds.   Pulmonary:      Effort: Pulmonary effort is normal.      Breath sounds: Normal breath sounds.   Abdominal:      General: Bowel sounds are normal.      Palpations: Abdomen is soft.   Musculoskeletal:      Cervical back: Normal range of motion and neck supple.      Comments: Left foot in bandage   Skin:     General: Skin is warm.   Neurological:      Mental Status: He is alert and oriented to person, place, and time.           DATA:     Laboratory:  CBC:  Recent Labs   Lab 06/09/21  1422 07/07/21  1216 09/15/21  1111   WBC 6.43 6.24 5.16   Hemoglobin 10.1 L 12.0 L 12.4 L   Hematocrit 32.5 L 38.4 L 40.6   Platelets 314 369 249       CHEMISTRIES:  Recent Labs   Lab 05/01/21  0629 05/01/21  0629 05/02/21  0649 05/02/21  0649 05/03/21  0426 05/11/21  1300 06/09/21  1422 07/07/21  1216 " 09/15/21  1111   Glucose 105   < > 159 H   < > 251 H  251 H   < > 289 H 79 135 H   Sodium 140   < > 143   < > 136  136   < > 140 141 139   Potassium 3.5   < > 3.7   < > 3.6  3.6   < > 3.6 4.0 3.9   BUN 8   < > 9   < > 10  10   < > 11 9 11   Creatinine 1.0   < > 0.9   < > 1.0  1.0   < > 1.2 1.1 1.0   eGFR if African American >60.0   < > >60.0   < > >60.0  >60.0   < > >60.0 >60.0 >60.0   eGFR if non  >60.0   < > >60.0   < > >60.0  >60.0   < > >60.0 >60.0 >60.0   Calcium 8.4 L   < > 9.0   < > 8.5 L  8.5 L   < > 9.1 10.1 9.7   Magnesium 1.5 L  --  1.6  --  1.6  --   --   --   --     < > = values in this interval not displayed.       CARDIAC BIOMARKERS:  Recent Labs   Lab 07/12/19  1037 03/21/20  0124 04/28/21  1222   Troponin I 0.073 H 0.025 0.010       COAGS:        LIPIDS/LFTS:  Recent Labs   Lab 08/26/19  1642 08/26/19  1642 12/03/19  1019 03/05/20  1617 03/29/20  0955 03/29/20  0955 03/30/20  0405 06/12/20  1005 06/09/21  1422 07/07/21  1216 07/14/21  1016 09/15/21  1111   Cholesterol 201 H  --  190  --   --   --   --   --   --   --  100 L  --    Triglycerides 91   < > 69   < > 164 H  --  167 H  --   --   --  61  --    HDL 50  --  56  --   --   --   --   --   --   --  35 L  --    LDL Cholesterol 132.8  --  120.2  --   --   --   --   --   --   --  52.8 L  --    Non-HDL Cholesterol 151  --  134  --   --   --   --   --   --   --  65  --    AST  --   --  16   < > 19   < > 22   < > 14 15  --  18   ALT  --   --  17   < > <5 L   < > <5 L   < > 12 11  --  19    < > = values in this interval not displayed.       Hemoglobin A1C   Date Value Ref Range Status   09/15/2021 8.1 (H) 4.0 - 5.6 % Final     Comment:     ADA Screening Guidelines:  5.7-6.4%  Consistent with prediabetes  >or=6.5%  Consistent with diabetes    High levels of fetal hemoglobin interfere with the HbA1C  assay. Heterozygous hemoglobin variants (HbS, HgC, etc)do  not significantly interfere with this assay.   However, presence of  multiple variants may affect accuracy.     07/14/2021 10.1 (H) 4.0 - 5.6 % Final     Comment:     ADA Screening Guidelines:  5.7-6.4%  Consistent with prediabetes  >or=6.5%  Consistent with diabetes    High levels of fetal hemoglobin interfere with the HbA1C  assay. Heterozygous hemoglobin variants (HbS, HgC, etc)do  not significantly interfere with this assay.   However, presence of multiple variants may affect accuracy.     04/28/2021 >14.0 (H) 4.0 - 5.6 % Final     Comment:     ADA Screening Guidelines:  5.7-6.4%  Consistent with prediabetes  >or=6.5%  Consistent with diabetes    High levels of fetal hemoglobin interfere with the HbA1C  assay. Heterozygous hemoglobin variants (HbS, HgC, etc)do  not significantly interfere with this assay.   However, presence of multiple variants may affect accuracy.         TSH        The ASCVD Risk score (Chinmaynancy CORTES Jr., et al., 2013) failed to calculate for the following reasons:    The valid total cholesterol range is 130 to 320 mg/dL             ASSESSMENT AND PLAN     Patient Active Problem List   Diagnosis    Essential hypertension    Mixed hyperlipidemia    Traumatic amputation of fifth toe of right foot    Type 2 diabetes mellitus with hyperglycemia, with long-term current use of insulin    Actinomyces infection    Hypokalemia    Neck pain    Diabetic ulcer of right midfoot associated with diabetes mellitus due to underlying condition, with fat layer exposed    Severe malnutrition    Hypertensive retinopathy, bilateral    Allergic reaction due to antibacterial drug    Chronic left shoulder pain    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 edema, with long-term current use of insulin    Diabetic ulcer of left foot    Septic arthritis of left foot    Acute on chronic osteomyelitis    Proteus infection       Nonhealing ulcer of left lower extremity.  Further evaluation the PPIs, peripheral  vascular ultrasound    Uncontrolled hypertension increase lisinopril to 10 mg daily.  Patient states he is concerned about increasing the dose to much because he is concerned hypertension.    Statins    Initiate aspirin and Plavix    Follow-up after testing.          Thank you very much for involving me in the care of your patient.  Please do not hesitate to contact me if there are any questions.      Marshall Stahl MD, FACC, [facility]  Interventional Cardiologist, [facility].           This note was dictated with the help of speech recognition software.  There might be un-intended errors and/or substitutions.

## 2022-01-28 ENCOUNTER — HOSPITAL ENCOUNTER (OUTPATIENT)
Dept: WOUND CARE | Facility: HOSPITAL | Age: 52
Discharge: HOME | End: 2022-01-28
Attending: PODIATRIST
Payer: MEDICARE

## 2022-01-28 VITALS — SYSTOLIC BLOOD PRESSURE: 140 MMHG | HEART RATE: 80 BPM | TEMPERATURE: 98 F | DIASTOLIC BLOOD PRESSURE: 70 MMHG

## 2022-01-28 DIAGNOSIS — E11.621 DIABETIC ULCER OF LEFT MIDFOOT ASSOCIATED WITH TYPE 2 DIABETES MELLITUS, WITH FAT LAYER EXPOSED: Primary | ICD-10-CM

## 2022-01-28 DIAGNOSIS — L97.422 DIABETIC ULCER OF LEFT MIDFOOT ASSOCIATED WITH TYPE 2 DIABETES MELLITUS, WITH FAT LAYER EXPOSED: Primary | ICD-10-CM

## 2022-01-28 PROCEDURE — 27201912 HC WOUND CARE DEBRIDEMENT SUPPLIES: Performed by: PODIATRIST

## 2022-01-28 PROCEDURE — 99499 NO LOS: ICD-10-PCS | Mod: ,,, | Performed by: PODIATRIST

## 2022-01-28 PROCEDURE — 11042 WOUND DEBRIDEMENT: ICD-10-PCS | Mod: ,,, | Performed by: PODIATRIST

## 2022-01-28 PROCEDURE — 11042 DBRDMT SUBQ TIS 1ST 20SQCM/<: CPT | Mod: ,,, | Performed by: PODIATRIST

## 2022-01-28 PROCEDURE — 11046 DBRDMT MUSC&/FSCA EA ADDL: CPT | Performed by: PODIATRIST

## 2022-01-28 PROCEDURE — 11042 DBRDMT SUBQ TIS 1ST 20SQCM/<: CPT | Performed by: PODIATRIST

## 2022-01-28 PROCEDURE — 99499 UNLISTED E&M SERVICE: CPT | Mod: ,,, | Performed by: PODIATRIST

## 2022-01-28 NOTE — PROGRESS NOTES
[facility]                Progress Note    Subjective:       Patient ID: Indio Ryder Jr. is a 51 y.o. male.    Chief Complaint: No chief complaint on file.    Returns to clinic for follow up of left foot wound. Dressing has remained intact. He has made nursing visits as scheduled.  He has attemtped to avoid excessive ambulation.  Walked to clinic unaided. No pain.       Review of Systems   Constitutional: Negative for activity change, appetite change, chills, fatigue and fever.   HENT: Negative for hearing loss.    Eyes: Negative for photophobia and visual disturbance.   Respiratory: Negative for cough, chest tightness, shortness of breath and wheezing.    Cardiovascular: Positive for leg swelling. Negative for chest pain and palpitations.   Gastrointestinal: Negative for constipation, diarrhea, nausea and vomiting.   Endocrine: Negative for cold intolerance and heat intolerance.   Genitourinary: Negative for flank pain.   Musculoskeletal: Positive for gait problem and myalgias. Negative for neck pain and neck stiffness.   Skin: Positive for wound. Negative for color change.   Neurological: Positive for numbness. Negative for weakness, light-headedness and headaches.        + paresthesia    Psychiatric/Behavioral: Negative for sleep disturbance.         Objective:        Physical Exam  Vitals reviewed.   Constitutional:       Appearance: He is well-developed.   Cardiovascular:      Comments: dorsalis pedis and posterior tibial pulses are palpable bilaterally. Capillary refill time is within normal limits. + pedal hair growth         Musculoskeletal:         General: No tenderness. Normal range of motion.      Comments: Normal angle, base, station of gait. All toes without clubbing, cyanosis, or signs of ischemia.  No pain to palpation bilateral lower extremities.  Range of motion, stability, muscle strength, and muscle tone normal bilateral feet and legs.     Skin:     General: Skin is warm and dry.      Coloration: Skin is not ashen or cyanotic.      Findings: Wound (see below) present. No rash.      Nails: There is no clubbing.   Neurological:      Mental Status: He is alert and oriented to person, place, and time.      Sensory: No sensory deficit.      Comments: Diminished/loss of protective sensation all toes bilateral to 10 gram monofilament.     Psychiatric:         Behavior: Behavior normal.           Vitals:    01/28/22 1000   BP: (!) 140/70   Pulse: 80   Temp: 97.7 °F (36.5 °C)       Assessment:           ICD-10-CM ICD-9-CM   1. Diabetic ulcer of left midfoot associated with type 2 diabetes mellitus, with fat layer exposed  E11.621 250.80    L97.422 707.14            Wound 10/25/21 1500 Diabetic Ulcer Left midline;plantar Foot (Active)   10/25/21 1500    Pre-existing: Yes   Primary Wound Type: Diabetic ulc   Side: Left   Orientation: midline;plantar   Location: Foot   Wound Number:    Ankle-Brachial Index:    Pulses:    Removal Indication and Assessment:    Wound Outcome:    (Retired) Wound Type:    (Retired) Wound Length (cm):    (Retired) Wound Width (cm):    (Retired) Depth (cm):    Wound Description (Comments):    Removal Indications:    Wound Image   01/28/22 1100   Wound WDL ex 01/28/22 1100   Dressing Appearance Moist drainage 01/28/22 1100   Drainage Amount Copious 01/28/22 1100   Drainage Characteristics/Odor Serous 01/28/22 1100   Appearance Red 01/28/22 1100   Tissue loss description Full thickness 01/28/22 1100   Red (%), Wound Tissue Color 100 % 01/28/22 1100   Periwound Area Macerated 01/28/22 1100   Wound Edges Open 01/28/22 1100   Wound Length (cm) 2.5 cm 01/28/22 1100   Wound Width (cm) 1.6 cm 01/28/22 1100   Wound Depth (cm) 0.2 cm 01/28/22 1100   Wound Volume (cm^3) 0.8 cm^3 01/28/22 1100   Wound Surface Area (cm^2) 4 cm^2 01/28/22 1100   Care Cleansed with:;Antimicrobial agent;Sterile normal saline 01/28/22 1100   Dressing Changed 01/28/22  "1100   Periwound Care Moisture barrier applied 01/28/22 1100   Compression Two layer compression 01/28/22 1100   Off Loading Football dressing 01/28/22 1100   Dressing Change Due 02/04/22 01/28/22 1100           Plan:            Drainage has foul odor and tan in color.  Wound bright red and beefy base with two open areas  Distal foot measured as cluster. Macerated around site. Length smaller by mm. Leave off felt pad as much drainage replace with Promogram, Optifoam fenestrated and long Mextra and Francisco Javier foam. Continue with football and zinc coflex.    Wound Debridement    Date/Time: 1/28/2022 10:00 AM  Performed by: Marivel Peterson DPM  Authorized by: Marivel Peterosn DPM     Time out: Immediately prior to procedure a "time out" was called to verify the correct patient, procedure, equipment, support staff and site/side marked as required.    Consent Done?:  Yes (Written)    Preparation: Patient was prepped and draped in usual sterile fashion    Local anesthesia used?: No      Wound Details:    Location:  Left foot    Location:  Left Midfoot    Type of Debridement:  Excisional       Length (cm):  2.5       Area (sq cm):  4       Width (cm):  1.6       Percent Debrided (%):  100       Depth (cm):  0.2       Total Area Debrided (sq cm):  4    Depth of debridement:  Subcutaneous tissue    Tissue debrided:  Epidermis, Dermis and Subcutaneous    Devitalized tissue debrided:  Callus    Instruments:  Curette    Bleeding:  Minimal  Hemostasis Achieved: Yes    Method Used:  Pressure  Patient tolerance:  Patient tolerated the procedure well with no immediate complications        Orders Placed This Encounter   Procedures    Change dressing     Soak wound with Dakins x 10min. Will do nurse visit on Monday so able to leave off custom felt pad. Promogram, silver Optifoam fenestrated then Mextra and long Francisco Javier foam with x3 cast padding with Coban and Zinc Coflex.        Follow up in about 10 days (around 2/7/2022).       "

## 2022-01-31 ENCOUNTER — HOSPITAL ENCOUNTER (OUTPATIENT)
Dept: CARDIOLOGY | Facility: HOSPITAL | Age: 52
Discharge: HOME | End: 2022-01-31
Attending: INTERNAL MEDICINE
Payer: MEDICARE

## 2022-01-31 ENCOUNTER — HOSPITAL ENCOUNTER (OUTPATIENT)
Dept: WOUND CARE | Facility: HOSPITAL | Age: 52
Discharge: HOME | End: 2022-01-31
Attending: INTERNAL MEDICINE
Payer: MEDICARE

## 2022-01-31 VITALS
TEMPERATURE: 98 F | SYSTOLIC BLOOD PRESSURE: 184 MMHG | DIASTOLIC BLOOD PRESSURE: 96 MMHG | HEART RATE: 83 BPM | RESPIRATION RATE: 20 BRPM

## 2022-01-31 DIAGNOSIS — L97.422 DIABETIC ULCER OF LEFT MIDFOOT ASSOCIATED WITH TYPE 2 DIABETES MELLITUS, WITH FAT LAYER EXPOSED: ICD-10-CM

## 2022-01-31 DIAGNOSIS — E11.621 DIABETIC ULCER OF LEFT MIDFOOT ASSOCIATED WITH TYPE 2 DIABETES MELLITUS, WITH FAT LAYER EXPOSED: ICD-10-CM

## 2022-01-31 DIAGNOSIS — L97.422 DIABETIC ULCER OF LEFT MIDFOOT ASSOCIATED WITH TYPE 2 DIABETES MELLITUS, WITH FAT LAYER EXPOSED: Primary | ICD-10-CM

## 2022-01-31 DIAGNOSIS — E11.621 DIABETIC ULCER OF LEFT MIDFOOT ASSOCIATED WITH TYPE 2 DIABETES MELLITUS, WITH FAT LAYER EXPOSED: Primary | ICD-10-CM

## 2022-01-31 LAB
ABDOMINAL IMA AP: 1.7 CM
ABDOMINAL IMA ED VEL: 0 CM/S
ABDOMINAL IMA PS VEL: 74 CM/S
ABDOMINAL IMA TRANS: 1.7 CM
ABDOMINAL INFRARENAL AORTA AP: 1.8 CM
ABDOMINAL INFRARENAL AORTA ED VEL: 0 CM/S
ABDOMINAL INFRARENAL AORTA PS VEL: 82 CM/S
ABDOMINAL INFRARENAL AORTA TRANS: 1.9 CM
ABDOMINAL JUXTARENAL AORTA AP: 1.8 CM
ABDOMINAL JUXTARENAL AORTA ED VEL: 0 CM/S
ABDOMINAL JUXTARENAL AORTA PS VEL: 76 CM/S
ABDOMINAL JUXTARENAL AORTA TRANS: 2 CM
ABDOMINAL LT COM ILIAC AP: 1 CM
ABDOMINAL LT COM ILIAC TRANS: 1.2 CM
ABDOMINAL LT COM ILIAC VEL: 101 CM/S
ABDOMINAL LT COM ILLIAC ED VEL: 0 CM/S
ABDOMINAL RT COM ILIAC AP: 1 CM
ABDOMINAL RT COM ILIAC TRANS: 0.9 CM
ABDOMINAL RT COM ILIAC VEL: 95 CM/S
ABDOMINAL RT COM ILLIAC ED VEL: 0 CM/S
ABDOMINAL SUPRARENAL AORTA AP: 2.2 CM
ABDOMINAL SUPRARENAL AORTA ED VEL: 0 CM/S
ABDOMINAL SUPRARENAL AORTA PS VEL: 80 CM/S
ABDOMINAL SUPRARENAL AORTA TRANS: 2.5 CM
LEFT ABI: 1.12
LEFT ANT TIBIAL SYS PSV: 85 CM/S
LEFT ARM BP: 148 MMHG
LEFT CFA PSV: 114 CM/S
LEFT DORSALIS PEDIS: 115 MMHG
LEFT EXTERNAL ILIAC PSV: 105 CM/S
LEFT PERONEAL SYS PSV: 163 CM/S
LEFT POPLITEAL PSV: 121 CM/S
LEFT POST TIBIAL SYS PSV: 113 CM/S
LEFT POSTERIOR TIBIAL: 166 MMHG
LEFT PROFUNDA SYS PSV: 82 CM/S
LEFT SUPER FEMORAL DIST SYS PSV: 150 CM/S
LEFT SUPER FEMORAL MID SYS PSV: 131 CM/S
LEFT SUPER FEMORAL OSTIAL SYS PSV: 87 CM/S
LEFT SUPER FEMORAL PROX SYS PSV: 128 CM/S
LEFT TIB/PER TRUNK SYS PSV: 111 CM/S
OHS CV LEFT LOWER EXTREMITY ABI (NO CALC): 1.12
OHS CV RIGHT ABI LOWER EXTREMITY (NO CALC): 0.99
RIGHT ABI: 0.99
RIGHT ANT TIBIAL SYS PSV: 134 CM/S
RIGHT ARM BP: 142 MMHG
RIGHT CFA PSV: 112 CM/S
RIGHT DORSALIS PEDIS: 143 MMHG
RIGHT EXTERNAL ILLIAC PSV: 110 CM/S
RIGHT PERONEAL SYS PSV: 79 CM/S
RIGHT POPLITEAL PSV: 85 CM/S
RIGHT POST TIBIAL SYS PSV: 95 CM/S
RIGHT POSTERIOR TIBIAL: 147 MMHG
RIGHT PROFUNDA SYS PSV: 58 CM/S
RIGHT SUPER FEMORAL DIST SYS PSV: 86 CM/S
RIGHT SUPER FEMORAL MID SYS PSV: 97 CM/S
RIGHT SUPER FEMORAL OSTIAL SYS PSV: 77 CM/S
RIGHT SUPER FEMORAL PROX SYS PSV: 98 CM/S
RIGHT TIB/PER TRUNK SYS PSV: 132 CM/S

## 2022-01-31 PROCEDURE — 29581 APPL MULTLAYER CMPRN SYS LEG: CPT

## 2022-01-31 PROCEDURE — 93922 UPR/L XTREMITY ART 2 LEVELS: CPT

## 2022-01-31 PROCEDURE — 93925 LOWER EXTREMITY STUDY: CPT | Mod: 26,,, | Performed by: INTERNAL MEDICINE

## 2022-01-31 PROCEDURE — 93925 LOWER EXTREMITY STUDY: CPT

## 2022-01-31 PROCEDURE — 93978 VASCULAR STUDY: CPT | Mod: 26,,, | Performed by: INTERNAL MEDICINE

## 2022-01-31 PROCEDURE — 93978 CV US ABDOMINAL AORTA EVALUATION (CUPID ONLY): ICD-10-PCS | Mod: 26,,, | Performed by: INTERNAL MEDICINE

## 2022-01-31 PROCEDURE — 93925 CV US DOPPLER ARTERIAL LEGS BILATERAL (CUPID ONLY): ICD-10-PCS | Mod: 26,,, | Performed by: INTERNAL MEDICINE

## 2022-01-31 PROCEDURE — 93922 UPR/L XTREMITY ART 2 LEVELS: CPT | Mod: 26,,, | Performed by: INTERNAL MEDICINE

## 2022-01-31 PROCEDURE — 93922 ANKLE BRACHIAL INDICES (ABI): ICD-10-PCS | Mod: 26,,, | Performed by: INTERNAL MEDICINE

## 2022-01-31 PROCEDURE — 93978 VASCULAR STUDY: CPT

## 2022-01-31 NOTE — PROGRESS NOTES
[facility]  [place]  [address]  Nurse Visit    Subjective:       Patient seen in clinic today.  Dressing changed as ordered. Patient to receive UrgoK2 multilayer compression wrap. Prior to application, patient's ankle circumference measures 27 cm and has current ALISSA of 1.12,  which correlates with UrgoK2 large.  Assessment:          Wound 10/25/21 1500 Diabetic Ulcer Left midline;plantar Foot (Active)   10/25/21 1500    Pre-existing: Yes   Primary Wound Type: Diabetic ulc   Side: Left   Orientation: midline;plantar   Location: Foot   Wound Number:    Ankle-Brachial Index:    Pulses:    Removal Indication and Assessment:    Wound Outcome:    (Retired) Wound Type:    (Retired) Wound Length (cm):    (Retired) Wound Width (cm):    (Retired) Depth (cm):    Wound Description (Comments):    Removal Indications:    Wound WDL ex 01/31/22 1400   Dressing Appearance Intact;Moist drainage 01/31/22 1400   Drainage Amount Large 01/31/22 1400   Drainage Characteristics/Odor Serosanguineous 01/31/22 1400   Appearance Red 01/31/22 1400   Tissue loss description Full thickness 01/31/22 1400   Black (%), Wound Tissue Color 0 % 01/31/22 1400   Red (%), Wound Tissue Color 100 % 01/31/22 1400   Periwound Area Macerated 01/31/22 1400   Wound Edges Open 01/31/22 1400   Care Cleansed with:;Soap and water;Sterile normal saline 01/31/22 1400   Dressing Changed 01/31/22 1400   Periwound Care Moisture barrier applied 01/31/22 1400   Compression Two layer compression 01/31/22 1400   Off Loading Football dressing;Off loading shoe 01/31/22 1400   Dressing Change Due 02/04/22 01/31/22 1400           Plan:     No orders of the defined types were placed in this encounter.          Follow up in about 4 days (around 2/4/2022) for wound care.

## 2022-02-04 ENCOUNTER — HOSPITAL ENCOUNTER (OUTPATIENT)
Dept: WOUND CARE | Facility: HOSPITAL | Age: 52
Discharge: HOME | End: 2022-02-04
Attending: PODIATRIST
Payer: MEDICARE

## 2022-02-04 VITALS
SYSTOLIC BLOOD PRESSURE: 137 MMHG | TEMPERATURE: 97 F | DIASTOLIC BLOOD PRESSURE: 100 MMHG | HEART RATE: 91 BPM | RESPIRATION RATE: 20 BRPM

## 2022-02-04 DIAGNOSIS — L97.422 DIABETIC ULCER OF LEFT MIDFOOT ASSOCIATED WITH TYPE 2 DIABETES MELLITUS, WITH FAT LAYER EXPOSED: Primary | ICD-10-CM

## 2022-02-04 DIAGNOSIS — E11.621 DIABETIC ULCER OF LEFT MIDFOOT ASSOCIATED WITH TYPE 2 DIABETES MELLITUS, WITH FAT LAYER EXPOSED: Primary | ICD-10-CM

## 2022-02-04 PROCEDURE — 87070 CULTURE OTHR SPECIMN AEROBIC: CPT | Performed by: PODIATRIST

## 2022-02-04 PROCEDURE — 99214 OFFICE O/P EST MOD 30 MIN: CPT | Mod: ,,, | Performed by: PODIATRIST

## 2022-02-04 PROCEDURE — 87077 CULTURE AEROBIC IDENTIFY: CPT | Mod: 59 | Performed by: PODIATRIST

## 2022-02-04 PROCEDURE — 87186 SC STD MICRODIL/AGAR DIL: CPT | Mod: 59 | Performed by: PODIATRIST

## 2022-02-04 PROCEDURE — 87075 CULTR BACTERIA EXCEPT BLOOD: CPT | Performed by: PODIATRIST

## 2022-02-04 PROCEDURE — 29581 APPL MULTLAYER CMPRN SYS LEG: CPT

## 2022-02-04 PROCEDURE — 99214 PR OFFICE/OUTPT VISIT, EST, LEVL IV, 30-39 MIN: ICD-10-PCS | Mod: ,,, | Performed by: PODIATRIST

## 2022-02-04 RX ORDER — CEFADROXIL 500 MG/1
500 CAPSULE ORAL 2 TIMES DAILY
Qty: 20 CAPSULE | Refills: 0 | Status: SHIPPED | OUTPATIENT
Start: 2022-02-04 | End: 2022-02-14

## 2022-02-04 NOTE — PROGRESS NOTES
[facility]                Progress Note    Subjective:       Patient ID: Indio Ryder Jr. is a 51 y.o. male.    Chief Complaint: Wound Check    F/u wound care visit. Patient ambulatory to exam room with no c/o pain or discomfort. Wound dressing to LLE intact with breakthrough drainage noted. Wound to left plantar foot measuring larger with increased maceration noted to periwound and foot. 4+ pitting edema noted to left foot. Patient states he didn't increase any activities or walking since wound dressing changed at nurse visit this week.       Review of Systems   Constitutional: Negative for activity change, appetite change, chills, fatigue and fever.   HENT: Negative for hearing loss.    Eyes: Negative for photophobia and visual disturbance.   Respiratory: Negative for cough, chest tightness, shortness of breath and wheezing.    Cardiovascular: Positive for leg swelling. Negative for chest pain and palpitations.   Gastrointestinal: Negative for constipation, diarrhea, nausea and vomiting.   Endocrine: Negative for cold intolerance and heat intolerance.   Genitourinary: Negative for flank pain.   Musculoskeletal: Positive for gait problem and myalgias. Negative for neck pain and neck stiffness.   Skin: Positive for wound. Negative for color change.   Neurological: Positive for numbness. Negative for weakness, light-headedness and headaches.        + paresthesia    Psychiatric/Behavioral: Negative for sleep disturbance.         Objective:        Physical Exam  Vitals reviewed.   Constitutional:       Appearance: He is well-developed.   Cardiovascular:      Comments: dorsalis pedis and posterior tibial pulses are palpable bilaterally. Capillary refill time is within normal limits. + pedal hair growth         Musculoskeletal:         General: No tenderness. Normal range of motion.      Comments: Normal angle, base, station of gait. All toes without clubbing, cyanosis,  or signs of ischemia.  No pain to palpation bilateral lower extremities.  Range of motion, stability, muscle strength, and muscle tone normal bilateral feet and legs.    Skin:     General: Skin is warm and dry.      Coloration: Skin is not ashen or cyanotic.      Findings: Wound (see below) present. No rash.      Nails: There is no clubbing.   Neurological:      Mental Status: He is alert and oriented to person, place, and time.      Sensory: No sensory deficit.      Comments: Diminished/loss of protective sensation all toes bilateral to 10 gram monofilament.     Psychiatric:         Behavior: Behavior normal.         Vitals:    02/04/22 1108   BP: (!) 137/100   Pulse: 91   Resp: 20   Temp: 97.2 °F (36.2 °C)       Assessment:           ICD-10-CM ICD-9-CM   1. Diabetic ulcer of left midfoot associated with type 2 diabetes mellitus, with fat layer exposed  E11.621 250.80    L97.422 707.14            Wound 10/25/21 1500 Diabetic Ulcer Left midline;plantar Foot (Active)   10/25/21 1500    Pre-existing: Yes   Primary Wound Type: Diabetic ulc   Side: Left   Orientation: midline;plantar   Location: Foot   Wound Number:    Ankle-Brachial Index:    Pulses:    Removal Indication and Assessment:    Wound Outcome:    (Retired) Wound Type:    (Retired) Wound Length (cm):    (Retired) Wound Width (cm):    (Retired) Depth (cm):    Wound Description (Comments):    Removal Indications:    Wound Image   02/04/22 1100   Wound WDL ex 02/04/22 1100   Dressing Appearance Intact;Dried drainage;Saturated 02/04/22 1100   Drainage Amount Large 02/04/22 1100   Drainage Characteristics/Odor Serosanguineous;Malodorous 02/04/22 1100   Appearance Red 02/04/22 1100   Tissue loss description Partial thickness 02/04/22 1100   Black (%), Wound Tissue Color 0 % 02/04/22 1100   Red (%), Wound Tissue Color 100 % 02/04/22 1100   Yellow (%), Wound Tissue Color 0 % 02/04/22 1100   Periwound Area Macerated 02/04/22 1100   Wound Edges Open 02/04/22 1100    Wound Length (cm) 4.8 cm 02/04/22 1100   Wound Width (cm) 4 cm 02/04/22 1100   Wound Depth (cm) 0.3 cm 02/04/22 1100   Wound Volume (cm^3) 5.76 cm^3 02/04/22 1100   Wound Surface Area (cm^2) 19.2 cm^2 02/04/22 1100   Care Cleansed with:;Soap and water;Sterile normal saline 02/04/22 1100   Dressing Changed 02/04/22 1100   Periwound Care Moisture barrier applied 02/04/22 1100   Off Loading Football dressing;Off loading shoe 02/04/22 1100           Plan:            Wound culture obtained. Wound care done per order. RTC on Tuesday 2/8/2022 for nurse visit and on Friday 2/11/2022 for MD visit.         Orders Placed This Encounter   Procedures    Aerobic culture    Culture, Anaerobic    Change dressing     Clean with saline. Cavilon/Gentian violet periwound. Custom-cut felt pad to plantar foot with small hole over wound bed. Apply Endform/Iodosorb to wound bed. Cover with Drawtex cut-to-fit x 2, Mextra long x1 secured with Medipore tape. Football dressing (cast padding x 3), Coflex Calamine. CAM Boot. Change during nurse visit.        Follow up in about 4 days (around 2/8/2022) for nurse visit.

## 2022-02-08 ENCOUNTER — TELEPHONE (OUTPATIENT)
Dept: ENDOSCOPY | Facility: HOSPITAL | Age: 52
End: 2022-02-08
Payer: MEDICARE

## 2022-02-08 ENCOUNTER — HOSPITAL ENCOUNTER (OUTPATIENT)
Dept: WOUND CARE | Facility: HOSPITAL | Age: 52
Discharge: HOME | End: 2022-02-08
Attending: FAMILY MEDICINE
Payer: MEDICARE

## 2022-02-08 VITALS
DIASTOLIC BLOOD PRESSURE: 98 MMHG | SYSTOLIC BLOOD PRESSURE: 176 MMHG | RESPIRATION RATE: 20 BRPM | TEMPERATURE: 98 F | HEART RATE: 91 BPM

## 2022-02-08 DIAGNOSIS — E11.621 DIABETIC ULCER OF LEFT MIDFOOT ASSOCIATED WITH TYPE 2 DIABETES MELLITUS, WITH FAT LAYER EXPOSED: Primary | ICD-10-CM

## 2022-02-08 DIAGNOSIS — L97.422 DIABETIC ULCER OF LEFT MIDFOOT ASSOCIATED WITH TYPE 2 DIABETES MELLITUS, WITH FAT LAYER EXPOSED: Primary | ICD-10-CM

## 2022-02-08 LAB
BACTERIA SPEC AEROBE CULT: ABNORMAL
BACTERIA SPEC ANAEROBE CULT: NORMAL

## 2022-02-08 PROCEDURE — 29581 APPL MULTLAYER CMPRN SYS LEG: CPT

## 2022-02-10 ENCOUNTER — PATIENT MESSAGE (OUTPATIENT)
Dept: ADMINISTRATIVE | Facility: HOSPITAL | Age: 52
End: 2022-02-10
Payer: MEDICARE

## 2022-02-10 ENCOUNTER — PATIENT OUTREACH (OUTPATIENT)
Dept: ADMINISTRATIVE | Facility: HOSPITAL | Age: 52
End: 2022-02-10
Payer: MEDICARE

## 2022-02-11 ENCOUNTER — HOSPITAL ENCOUNTER (OUTPATIENT)
Dept: WOUND CARE | Facility: HOSPITAL | Age: 52
Discharge: HOME | End: 2022-02-11
Attending: PODIATRIST
Payer: MEDICARE

## 2022-02-11 DIAGNOSIS — L97.422 DIABETIC ULCER OF LEFT MIDFOOT ASSOCIATED WITH TYPE 2 DIABETES MELLITUS, WITH FAT LAYER EXPOSED: Primary | ICD-10-CM

## 2022-02-11 DIAGNOSIS — E11.621 DIABETIC ULCER OF LEFT MIDFOOT ASSOCIATED WITH TYPE 2 DIABETES MELLITUS, WITH FAT LAYER EXPOSED: Primary | ICD-10-CM

## 2022-02-11 PROCEDURE — 99499 UNLISTED E&M SERVICE: CPT | Mod: ,,, | Performed by: PODIATRIST

## 2022-02-11 PROCEDURE — 99499 NO LOS: ICD-10-PCS | Mod: ,,, | Performed by: PODIATRIST

## 2022-02-11 PROCEDURE — 11042 DBRDMT SUBQ TIS 1ST 20SQCM/<: CPT | Performed by: PODIATRIST

## 2022-02-11 PROCEDURE — 11045 WOUND DEBRIDEMENT: ICD-10-PCS | Mod: ,,, | Performed by: PODIATRIST

## 2022-02-11 PROCEDURE — 11042 DBRDMT SUBQ TIS 1ST 20SQCM/<: CPT | Mod: ,,, | Performed by: PODIATRIST

## 2022-02-11 PROCEDURE — 11045 DBRDMT SUBQ TISS EACH ADDL: CPT | Performed by: PODIATRIST

## 2022-02-11 PROCEDURE — 11042 WOUND DEBRIDEMENT: ICD-10-PCS | Mod: ,,, | Performed by: PODIATRIST

## 2022-02-11 PROCEDURE — 27201912 HC WOUND CARE DEBRIDEMENT SUPPLIES: Performed by: PODIATRIST

## 2022-02-11 PROCEDURE — 11045 DBRDMT SUBQ TISS EACH ADDL: CPT | Mod: ,,, | Performed by: PODIATRIST

## 2022-02-11 NOTE — PROGRESS NOTES
[facility]                Progress Note    Subjective:       Patient ID: Indio Ryder Jr. is a 51 y.o. male.    Chief Complaint: No chief complaint on file.    Walked to clinic wearing CAM boot. States blood sugar in 120s. Relates he has limited activity.  No new pedal complaints      Review of Systems   Constitutional: Negative for activity change, appetite change, chills, fatigue and fever.   HENT: Negative for hearing loss.    Eyes: Negative for photophobia and visual disturbance.   Respiratory: Negative for cough, chest tightness, shortness of breath and wheezing.    Cardiovascular: Positive for leg swelling. Negative for chest pain and palpitations.   Gastrointestinal: Negative for constipation, diarrhea, nausea and vomiting.   Endocrine: Negative for cold intolerance and heat intolerance.   Genitourinary: Negative for flank pain.   Musculoskeletal: Positive for gait problem and myalgias. Negative for neck pain and neck stiffness.   Skin: Positive for wound. Negative for color change.   Neurological: Positive for numbness. Negative for weakness, light-headedness and headaches.        + paresthesia    Psychiatric/Behavioral: Negative for sleep disturbance.         Objective:        Physical Exam  Vitals reviewed.   Constitutional:       Appearance: He is well-developed.   Cardiovascular:      Comments: dorsalis pedis and posterior tibial pulses are palpable bilaterally. Capillary refill time is within normal limits. + pedal hair growth         Musculoskeletal:         General: No tenderness. Normal range of motion.      Comments: Normal angle, base, station of gait. All toes without clubbing, cyanosis, or signs of ischemia.  No pain to palpation bilateral lower extremities.  Range of motion, stability, muscle strength, and muscle tone normal bilateral feet and legs.    Skin:     General: Skin is warm and dry.      Coloration: Skin is not ashen or cyanotic.       Findings: Wound (see below) present. No rash.      Nails: There is no clubbing.   Neurological:      Mental Status: He is alert and oriented to person, place, and time.      Sensory: No sensory deficit.      Comments: Diminished/loss of protective sensation all toes bilateral to 10 gram monofilament.     Psychiatric:         Behavior: Behavior normal.           There were no vitals filed for this visit.    Assessment:           ICD-10-CM ICD-9-CM   1. Diabetic ulcer of left midfoot associated with type 2 diabetes mellitus, with fat layer exposed  E11.621 250.80    L97.422 707.14            Wound 10/25/21 1500 Diabetic Ulcer Left midline;plantar Foot (Active)   10/25/21 1500    Pre-existing: Yes   Primary Wound Type: Diabetic ulc   Side: Left   Orientation: midline;plantar   Location: Foot   Wound Number:    Ankle-Brachial Index:    Pulses:    Removal Indication and Assessment:    Wound Outcome:    (Retired) Wound Type:    (Retired) Wound Length (cm):    (Retired) Wound Width (cm):    (Retired) Depth (cm):    Wound Description (Comments):    Removal Indications:    Wound Image   02/11/22 1200   Wound WDL ex 02/11/22 1200   Dressing Appearance Dry;Intact 02/11/22 1200   Drainage Amount Large 02/11/22 1200   Drainage Characteristics/Odor Serous 02/11/22 1200   Appearance Red 02/11/22 1200   Tissue loss description Partial thickness 02/11/22 1200   Red (%), Wound Tissue Color 100 % 02/11/22 1200   Periwound Area Macerated 02/11/22 1200   Wound Edges Defined 02/11/22 1200   Wound Length (cm) 5 cm 02/11/22 1200   Wound Width (cm) 6 cm 02/11/22 1200   Wound Depth (cm) 0.3 cm 02/11/22 1200   Wound Volume (cm^3) 9 cm^3 02/11/22 1200   Wound Surface Area (cm^2) 30 cm^2 02/11/22 1200   Care Cleansed with:;Antimicrobial agent;Sterile normal saline 02/11/22 1200   Dressing Changed 02/11/22 1200   Periwound Care Moisture barrier applied 02/11/22 1200   Off Loading Football dressing;Off loading shoe 02/11/22 1200   Dressing Change  "Due 02/15/22 02/11/22 1200           Plan:            Wound Debridement    Date/Time: 2/11/2022 10:52 AM  Performed by: Marivel Peterson DPM  Authorized by: Marivel Peterson DPM     Time out: Immediately prior to procedure a "time out" was called to verify the correct patient, procedure, equipment, support staff and site/side marked as required.    Consent Done?:  Yes (Written)    Preparation: Patient was prepped and draped in usual sterile fashion    Local anesthesia used?: No      Wound Details:    Location:  Left foot    Location:  Left Midfoot    Type of Debridement:  Excisional       Length (cm):  5       Area (sq cm):  30       Width (cm):  6       Percent Debrided (%):  100       Depth (cm):  0.3       Total Area Debrided (sq cm):  30    Depth of debridement:  Subcutaneous tissue    Tissue debrided:  Dermis, Epidermis and Subcutaneous    Devitalized tissue debrided:  Callus and Fibrin    Instruments:  Forceps, Scissors and Nippers    Bleeding:  Minimal  Hemostasis Achieved: Yes    Method Used:  Pressure  Patient tolerance:  Patient tolerated the procedure well with no immediate complications        Orders Placed This Encounter   Procedures    Change dressing     Clean with saline. Cavilon/Gentian violet periwound. Custom-cut felt pad to plantar foot with small hole over wound bed. Apply Iodosorb to wound bed. Cover with Drawtex cut-to-fit x 2, Mextra long x1 secured with Medipore tape. Football dressing (cast padding x 3), Coflex Calamine. CAM Boot. Change during nurse visit.        Follow up in about 1 week (around 2/18/2022) for nurse visit next week.       "

## 2022-02-15 ENCOUNTER — HOSPITAL ENCOUNTER (OUTPATIENT)
Dept: WOUND CARE | Facility: HOSPITAL | Age: 52
Discharge: HOME | End: 2022-02-15
Attending: PODIATRIST
Payer: MEDICARE

## 2022-02-15 VITALS — SYSTOLIC BLOOD PRESSURE: 171 MMHG | HEART RATE: 89 BPM | DIASTOLIC BLOOD PRESSURE: 95 MMHG

## 2022-02-15 DIAGNOSIS — E11.621 DIABETIC ULCER OF LEFT MIDFOOT ASSOCIATED WITH TYPE 2 DIABETES MELLITUS, WITH FAT LAYER EXPOSED: Primary | ICD-10-CM

## 2022-02-15 DIAGNOSIS — L97.422 DIABETIC ULCER OF LEFT MIDFOOT ASSOCIATED WITH TYPE 2 DIABETES MELLITUS, WITH FAT LAYER EXPOSED: Primary | ICD-10-CM

## 2022-02-15 PROCEDURE — 29581 APPL MULTLAYER CMPRN SYS LEG: CPT

## 2022-02-15 NOTE — PROGRESS NOTES
I have personally performed a face to face diagnostic evaluation on this patient. I have fully participated in the care of this patient. I have reviewed and agree with all pertinent clinical information including history, physical exam, diagnostic tests, and the plan. HPI: Christal Lira is a 13 y.o. female  Who presented to the Emergency Department with abdominal pain nausea. Patient has neck pain on the right side but states pain is not to the Center the neck or with turning the neck to the left. Patient reports it to be more posterior to the shoulder. Patient denies significant fever. Patient reports upper abdominal pain. Patient denies lower abdominal pain, vaginal bleeding, dysuria urgency or frequency. Chief Complaint   Patient presents with    Abdominal Pain     pt started vomiting this morning. sts she has had pain in abd and neck x 3 days    Emesis    Neck Pain          PAST MEDICAL HISTORY     Past Medical History:   Diagnosis Date    ADHD (attention deficit hyperactivity disorder)          SURGICAL HISTORY     History reviewed. No pertinent surgical history. CURRENT MEDICATIONS       Previous Medications    IBUPROFEN (ADVIL;MOTRIN) 200 MG TABLET    Take 200 mg by mouth every 6 hours as needed for Pain         Review of Systems: See LEONORA note    PHYSICAL EXAM    VITAL SIGNS: BP 95/83   Pulse 96   Temp 99.3 °F (37.4 °C) (Oral)   Ht 5' 3\" (1.6 m)   Wt 130 lb (59 kg)   LMP  (LMP Unknown)   SpO2 100%   BMI 23.03 kg/m²    Constitutional:  Well developed, Well nourished, No acute distress, Non-toxic appearance. HENT:  Normocephalic, Atraumatic, Bilateral external ears normal, Oropharynx moist, No oral exudates, Nose normal.   Neck: Normal range of motion, No tenderness, Supple, No stridor. Eyes:   Conjunctiva normal, No discharge. Respiratory:  Normal breath sounds, No respiratory distress, No wheezing, No chest tenderness.    Cardiovascular:  Normal heart rate, Normal rhythm, No [facility]  [place]  [address]  Nurse Visit    Subjective:       Patient seen in clinic today.  Dressing changed as ordered.      Assessment:          Wound 10/25/21 1500 Diabetic Ulcer Left midline;plantar Foot (Active)   10/25/21 1500    Pre-existing: Yes   Primary Wound Type: Diabetic ulc   Side: Left   Orientation: midline;plantar   Location: Foot   Wound Number:    Ankle-Brachial Index:    Pulses:    Removal Indication and Assessment:    Wound Outcome:    (Retired) Wound Type:    (Retired) Wound Length (cm):    (Retired) Wound Width (cm):    (Retired) Depth (cm):    Wound Description (Comments):    Removal Indications:    Wound WDL ex 02/15/22 1400   Dressing Appearance Intact;Moist drainage 02/15/22 1400   Drainage Amount Large 02/15/22 1400   Drainage Characteristics/Odor Serosanguineous 02/15/22 1400   Appearance Red 02/15/22 1400   Tissue loss description Partial thickness 02/15/22 1400   Black (%), Wound Tissue Color 0 % 02/15/22 1400   Red (%), Wound Tissue Color 100 % 02/15/22 1400   Yellow (%), Wound Tissue Color 0 % 02/15/22 1400   Periwound Area Macerated 02/15/22 1400   Wound Edges Defined 02/15/22 1400   Care Cleansed with:;Soap and water;Sterile normal saline 02/15/22 1400   Dressing Changed 02/15/22 1400   Periwound Care Moisture barrier applied 02/15/22 1400   Compression Two layer compression 02/15/22 1400   Off Loading Football dressing;Off loading shoe 02/15/22 1400   Dressing Change Due 02/18/22 02/15/22 1400           Plan:     No orders of the defined types were placed in this encounter.          Follow up in about 3 days (around 2/18/2022) for wound care.         Glucose 96 70 - 99 mg/dL    BUN 10 7 - 21 mg/dL    CREATININE 0.6 0.5 - 1.0 mg/dL    GFR Non-African American >60 >60    GFR African American >60 >60    Calcium 9.9 8.4 - 10.2 mg/dL    Total Protein 7.9 6.4 - 8.6 g/dL    Alb 4.8 3.8 - 5.6 g/dL    Albumin/Globulin Ratio 1.5 1.1 - 2.2    Total Bilirubin 0.5 0.0 - 1.0 mg/dL    Alkaline Phosphatase 96 50 - 162 U/L    ALT 10 10 - 40 U/L    AST 18 5 - 26 U/L    Globulin 3.1 g/dL       RADIOLOGY  XR CHEST STANDARD (2 VW)   Final Result   Unremarkable chest.                  (Please note that portions of this note were completed with a voice recognition program.  Efforts were made to edit the dictations but occasionally words are mis-transcribed.)    Brandon Malik MD (electronically signed)        Amina Agrawal MD  07/17/18 [id number]

## 2022-02-18 ENCOUNTER — HOSPITAL ENCOUNTER (OUTPATIENT)
Dept: WOUND CARE | Facility: HOSPITAL | Age: 52
Discharge: HOME | End: 2022-02-18
Attending: PODIATRIST
Payer: MEDICARE

## 2022-02-18 VITALS
RESPIRATION RATE: 20 BRPM | TEMPERATURE: 98 F | SYSTOLIC BLOOD PRESSURE: 151 MMHG | DIASTOLIC BLOOD PRESSURE: 86 MMHG | HEART RATE: 89 BPM

## 2022-02-18 DIAGNOSIS — E11.621 DIABETIC ULCER OF LEFT MIDFOOT ASSOCIATED WITH TYPE 2 DIABETES MELLITUS, WITH FAT LAYER EXPOSED: Primary | ICD-10-CM

## 2022-02-18 DIAGNOSIS — L97.422 DIABETIC ULCER OF LEFT MIDFOOT ASSOCIATED WITH TYPE 2 DIABETES MELLITUS, WITH FAT LAYER EXPOSED: Primary | ICD-10-CM

## 2022-02-18 PROCEDURE — 11042 WOUND DEBRIDEMENT: ICD-10-PCS | Mod: ,,, | Performed by: PODIATRIST

## 2022-02-18 PROCEDURE — 99499 UNLISTED E&M SERVICE: CPT | Mod: ,,, | Performed by: PODIATRIST

## 2022-02-18 PROCEDURE — 11042 DBRDMT SUBQ TIS 1ST 20SQCM/<: CPT | Performed by: PODIATRIST

## 2022-02-18 PROCEDURE — 11042 DBRDMT SUBQ TIS 1ST 20SQCM/<: CPT | Mod: ,,, | Performed by: PODIATRIST

## 2022-02-18 PROCEDURE — 27201912 HC WOUND CARE DEBRIDEMENT SUPPLIES: Performed by: PODIATRIST

## 2022-02-18 PROCEDURE — 99499 NO LOS: ICD-10-PCS | Mod: ,,, | Performed by: PODIATRIST

## 2022-02-18 NOTE — PROGRESS NOTES
[facility]                Progress Note    Subjective:       Patient ID: Indio Ryder Jr. is a 51 y.o. male.    Chief Complaint: Wound Check    F/u wound care visit. Patient ambulatory to exam room with no c/o pain or discomfort at present. Wound dressing to LLE intact with large amount of drainage noted.      Review of Systems   Constitutional: Negative for activity change, appetite change, chills, fatigue and fever.   HENT: Negative for hearing loss.    Eyes: Negative for photophobia and visual disturbance.   Respiratory: Negative for cough, chest tightness, shortness of breath and wheezing.    Cardiovascular: Positive for leg swelling. Negative for chest pain and palpitations.   Gastrointestinal: Negative for constipation, diarrhea, nausea and vomiting.   Endocrine: Negative for cold intolerance and heat intolerance.   Genitourinary: Negative for flank pain.   Musculoskeletal: Positive for gait problem and myalgias. Negative for neck pain and neck stiffness.   Skin: Positive for wound. Negative for color change.   Neurological: Positive for numbness. Negative for weakness, light-headedness and headaches.        + paresthesia    Psychiatric/Behavioral: Negative for sleep disturbance.         Objective:        Physical Exam  Vitals reviewed.   Constitutional:       Appearance: He is well-developed.   Cardiovascular:      Comments: dorsalis pedis and posterior tibial pulses are palpable bilaterally. Capillary refill time is within normal limits. + pedal hair growth         Musculoskeletal:         General: No tenderness. Normal range of motion.      Comments: Normal angle, base, station of gait. All toes without clubbing, cyanosis, or signs of ischemia.  No pain to palpation bilateral lower extremities.  Range of motion, stability, muscle strength, and muscle tone normal bilateral feet and legs.    Skin:     General: Skin is warm and dry.      Coloration: Skin is  not ashen or cyanotic.      Findings: Wound (see below) present. No rash.      Nails: There is no clubbing.   Neurological:      Mental Status: He is alert and oriented to person, place, and time.      Sensory: No sensory deficit.      Comments: Diminished/loss of protective sensation all toes bilateral to 10 gram monofilament.     Psychiatric:         Behavior: Behavior normal.           Vitals:    02/18/22 1106   BP: (!) 151/86   Pulse: 89   Resp: 20   Temp: 97.5 °F (36.4 °C)       Assessment:           ICD-10-CM ICD-9-CM   1. Diabetic ulcer of left midfoot associated with type 2 diabetes mellitus, with fat layer exposed  E11.621 250.80    L97.422 707.14            Wound 10/25/21 1500 Diabetic Ulcer Left midline;plantar Foot (Active)   10/25/21 1500    Pre-existing: Yes   Primary Wound Type: Diabetic ulc   Side: Left   Orientation: midline;plantar   Location: Foot   Wound Number:    Ankle-Brachial Index:    Pulses:    Removal Indication and Assessment:    Wound Outcome:    (Retired) Wound Type:    (Retired) Wound Length (cm):    (Retired) Wound Width (cm):    (Retired) Depth (cm):    Wound Description (Comments):    Removal Indications:    Wound Image   02/18/22 1000   Wound WDL ex 02/18/22 1000   Dressing Appearance Intact;Moist drainage 02/18/22 1000   Drainage Amount Moderate 02/18/22 1000   Drainage Characteristics/Odor Serous 02/18/22 1000   Appearance Red 02/18/22 1000   Tissue loss description Partial thickness 02/18/22 1000   Black (%), Wound Tissue Color 0 % 02/18/22 1000   Red (%), Wound Tissue Color 100 % 02/18/22 1000   Yellow (%), Wound Tissue Color 0 % 02/18/22 1000   Periwound Area Macerated 02/18/22 1000   Wound Edges Defined 02/18/22 1000   Wound Length (cm) 4 cm 02/18/22 1000   Wound Width (cm) 2.5 cm 02/18/22 1000   Wound Depth (cm) 0.3 cm 02/18/22 1000   Wound Volume (cm^3) 3 cm^3 02/18/22 1000   Wound Surface Area (cm^2) 10 cm^2 02/18/22 1000   Care Cleansed with:;Soap and water;Sterile normal  "saline 02/18/22 1000   Dressing Changed 02/18/22 1000   Periwound Care Moisture barrier applied 02/18/22 1000   Compression Two layer compression 02/18/22 1000   Off Loading Football dressing 02/18/22 1000   Dressing Change Due 02/23/22 02/18/22 1000           Plan:               Wound to left plantar foot improving, measuring smaller. Wound care done per order. RTC in on Wednesday 2/23/22 and on Friday 2/25/22 for MD visit.       Wound Debridement    Date/Time: 2/18/2022 10:28 AM  Performed by: Marivel Peterson DPM  Authorized by: Marivel Peterson DPM     Time out: Immediately prior to procedure a "time out" was called to verify the correct patient, procedure, equipment, support staff and site/side marked as required.    Consent Done?:  Yes (Written)    Preparation: Patient was prepped and draped in usual sterile fashion    Local anesthesia used?: No      Wound Details:    Location:  Left foot    Location:  Left Midfoot       Length (cm):  4       Area (sq cm):  10       Width (cm):  2.5       Percent Debrided (%):  100       Depth (cm):  0.3       Total Area Debrided (sq cm):  10    Depth of debridement:  Subcutaneous tissue    Tissue debrided:  Dermis, Epidermis and Subcutaneous    Devitalized tissue debrided:  Callus and Fibrin    Instruments:  Curette    Bleeding:  Minimal  Hemostasis Achieved: Yes    Method Used:  Pressure        Orders Placed This Encounter   Procedures    Change dressing     Clean with saline. Cavilon/Gentian violet periwound. Custom-cut felt pad to plantar foot with small hole over wound bed. Apply Iodosorb to wound bed. Cover with Drawtex cut-to-fit x 2, Mextra long x2 secured with Medipore tape. Football dressing (cast padding x 3), Coflex Calamine. CAM Boot. Change during nurse visit.        Follow up in about 5 days (around 2/23/2022) for nurse visit.       "

## 2022-02-21 ENCOUNTER — PATIENT MESSAGE (OUTPATIENT)
Dept: RESEARCH | Facility: HOSPITAL | Age: 52
End: 2022-02-21
Payer: MEDICARE

## 2022-02-21 ENCOUNTER — PATIENT OUTREACH (OUTPATIENT)
Dept: ADMINISTRATIVE | Facility: OTHER | Age: 52
End: 2022-02-21
Payer: MEDICARE

## 2022-02-22 ENCOUNTER — OFFICE VISIT (OUTPATIENT)
Dept: CARDIOLOGY | Facility: CLINIC | Age: 52
End: 2022-02-22
Payer: MEDICARE

## 2022-02-22 VITALS
HEART RATE: 91 BPM | DIASTOLIC BLOOD PRESSURE: 96 MMHG | HEIGHT: 73 IN | SYSTOLIC BLOOD PRESSURE: 156 MMHG | BODY MASS INDEX: 29.16 KG/M2 | WEIGHT: 220 LBS

## 2022-02-22 DIAGNOSIS — L97.412 DIABETIC ULCER OF RIGHT MIDFOOT ASSOCIATED WITH DIABETES MELLITUS DUE TO UNDERLYING CONDITION, WITH FAT LAYER EXPOSED: ICD-10-CM

## 2022-02-22 DIAGNOSIS — E87.6 HYPOKALEMIA: ICD-10-CM

## 2022-02-22 DIAGNOSIS — I10 ESSENTIAL HYPERTENSION: ICD-10-CM

## 2022-02-22 DIAGNOSIS — E11.621 DIABETIC ULCER OF LEFT HEEL ASSOCIATED WITH TYPE 2 DIABETES MELLITUS, WITH FAT LAYER EXPOSED: ICD-10-CM

## 2022-02-22 DIAGNOSIS — E08.621 DIABETIC ULCER OF RIGHT MIDFOOT ASSOCIATED WITH DIABETES MELLITUS DUE TO UNDERLYING CONDITION, WITH FAT LAYER EXPOSED: ICD-10-CM

## 2022-02-22 DIAGNOSIS — E78.2 MIXED HYPERLIPIDEMIA: ICD-10-CM

## 2022-02-22 DIAGNOSIS — L97.422 DIABETIC ULCER OF LEFT HEEL ASSOCIATED WITH TYPE 2 DIABETES MELLITUS, WITH FAT LAYER EXPOSED: ICD-10-CM

## 2022-02-22 DIAGNOSIS — R06.09 DOE (DYSPNEA ON EXERTION): Primary | ICD-10-CM

## 2022-02-22 DIAGNOSIS — Z79.4 TYPE 2 DIABETES MELLITUS WITH HYPERGLYCEMIA, WITH LONG-TERM CURRENT USE OF INSULIN: ICD-10-CM

## 2022-02-22 DIAGNOSIS — S98.131S: ICD-10-CM

## 2022-02-22 DIAGNOSIS — E43 SEVERE MALNUTRITION: ICD-10-CM

## 2022-02-22 DIAGNOSIS — E11.65 TYPE 2 DIABETES MELLITUS WITH HYPERGLYCEMIA, WITH LONG-TERM CURRENT USE OF INSULIN: ICD-10-CM

## 2022-02-22 DIAGNOSIS — R06.02 SOB (SHORTNESS OF BREATH): ICD-10-CM

## 2022-02-22 PROCEDURE — 99214 OFFICE O/P EST MOD 30 MIN: CPT | Mod: S$PBB,,, | Performed by: INTERNAL MEDICINE

## 2022-02-22 PROCEDURE — 99214 OFFICE O/P EST MOD 30 MIN: CPT | Mod: PBBFAC | Performed by: INTERNAL MEDICINE

## 2022-02-22 PROCEDURE — 99999 PR PBB SHADOW E&M-EST. PATIENT-LVL IV: CPT | Mod: PBBFAC,,, | Performed by: INTERNAL MEDICINE

## 2022-02-22 PROCEDURE — 99999 PR PBB SHADOW E&M-EST. PATIENT-LVL IV: ICD-10-PCS | Mod: PBBFAC,,, | Performed by: INTERNAL MEDICINE

## 2022-02-22 PROCEDURE — 99214 PR OFFICE/OUTPT VISIT, EST, LEVL IV, 30-39 MIN: ICD-10-PCS | Mod: S$PBB,,, | Performed by: INTERNAL MEDICINE

## 2022-02-22 RX ORDER — LISINOPRIL 20 MG/1
20 TABLET ORAL DAILY
Qty: 90 TABLET | Refills: 3 | Status: SHIPPED | OUTPATIENT
Start: 2022-02-22 | End: 2022-06-30

## 2022-02-22 NOTE — PROGRESS NOTES
CARDIOVASCULAR CONSULTATION    REASON FOR CONSULT:   Indio Ryder Jr. is a 51 y.o. male who presents for evaluation.      HISTORY OF PRESENT ILLNESS:     Patient is a pleasant 51-year-old man.  Has been referred from Podiatry for further evaluation of severe peripheral vascular disease.  Holter wound bandage.  States wound is not wound.  Denies any chest pains at rest exertion,      Feb 22:  Patient here for follow-up.  States that the wound seems to be healing well.  Ultrasound did not reveal any significant PAD      No evidence of hemodynamically significant infrainguinal PAD bilaterally.  Predominantly biphasic waveforms throughout.  Normal ALISSA bilaterally.      No evidence of AAA      Normal ALISSA bilaterally.  Normal PVR waveforms on right.  Mildly dampened PVR waveforms on left.             PAST MEDICAL HISTORY:     Past Medical History:   Diagnosis Date    Actinomyces infection 1/17/2017    Right foot    Diabetic ketoacidosis without coma associated with type 2 diabetes mellitus 5/30/2017    Diabetic ulcer of right foot associated with type 2 diabetes mellitus 6/3/2015    Disorder of kidney and ureter     Essential hypertension 6/6/2013    Group B streptococcal infection 1/13/2017    Mixed hyperlipidemia 8/12/2014    Septic arthritis of left foot 4/28/2021    Shoulder impingement 8/12/2014    Subacute osteomyelitis of right foot 1/12/2017    Streptococcus agalactiae, Actinomyces odontolyticus    Traumatic amputation of fifth toe of right foot 07/02/2015    Type 2 diabetes mellitus with diabetic neuropathy, with long-term current use of insulin 5/3/2016    Ulcerative colitis, unspecified        PAST SURGICAL HISTORY:     Past Surgical History:   Procedure Laterality Date    COLONOSCOPY Right 1/19/2022    Procedure: COLONOSCOPY;  Surgeon: Tj Haile MD;  Location: [address];  Service: Endoscopy;  Laterality: Right;  previous order un-usable/poor prep 1/18/22  RAPID COVID test  "arrival 12:20  instructions handed to pt- sm    DEBRIDEMENT OF FOOT Left 7/14/2019    Procedure: DEBRIDEMENT, FOOT, with left 5th ray amputation;  Surgeon: Sis Hickman DPM;  Location: [facility] OR 2ND FLR;  Service: Podiatry;  Laterality: Left;    DEBRIDEMENT OF FOOT Left 7/17/2019    Procedure: DEBRIDEMENT, FOOT with leftt 5th ray partial amputation with Neox Graft;  Surgeon: Mai Burrell DPM;  Location: [facility] OR 2ND FLR;  Service: Podiatry;  Laterality: Left;  t    DEBRIDEMENT OF FOOT Bilateral 4/29/2021    Procedure: DEBRIDEMENT, FOOT;  Surgeon: Mai Burrell DPM;  Location: [facility] 1ST FLR;  Service: Podiatry;  Laterality: Bilateral;  Graft application    TOE AMPUTATION Right 06/05/2015    TOE AMPUTATION Right 01/19/2017       ALLERGIES AND MEDICATION:   Review of patient's allergies indicates:  No Known Allergies     Medication List          Accurate as of February 22, 2022  2:33 PM. If you have any questions, ask your nurse or doctor.            CONTINUE taking these medications    acetaminophen 325 MG tablet  Commonly known as: TYLENOL  Take 2 tablets (650 mg total) by mouth every 6 (six) hours as needed.     aspirin 81 MG Chew  Chew and swallow 1 tablet (81 mg total) by mouth once daily.     atorvastatin 80 MG tablet  Commonly known as: LIPITOR  Take 1 tablet (80 mg total) by mouth once daily.     BD ULTRA-FINE SASCHA PEN NEEDLE 32 gauge x 5/32" Ndle  Generic drug: pen needle, diabetic  Use 4 times a day     clopidogreL 75 mg tablet  Commonly known as: PLAVIX  Take 1 tablet (75 mg total) by mouth once daily.     flu vacc [id number] 6mos up(PF) 60 mcg (15 mcg x 4)/0.5 mL Syrg  inject IM by [facility]     insulin aspart U-100 100 unit/mL (3 mL) Inpn pen  Commonly known as: NovoLOG  Inject 8 units before meals plus scale 150-200+2, 201-250+4, 251-300+6, 301-350+8, >350+10. Max daily 54 units.     insulin detemir U-100 100 unit/mL (3 mL) Inpn pen  Commonly known as: Levemir FLEXTOUCH  Inject 24 " Units into the skin every evening.     lisinopriL 10 MG tablet  Take 1 tablet (10 mg total) by mouth once daily.     metFORMIN 500 MG tablet  Commonly known as: GLUCOPHAGE  Take 2 tablets by mouth in the morning, and take 2 tablets by mouth at night.     ONETOUCH DELICA LANCETS 33 gauge Misc  Generic drug: lancets     ONETOUCH ULTRA2 METER kit  Generic drug: blood-glucose meter     permethrin 5 % cream  Commonly known as: ELIMITE  Apply neck down at night for 1 application. Wash off in morning and Repeat in 1 week     TRULICITY 1.5 mg/0.5 mL pen injector  Generic drug: dulaglutide  Inject 1.5 mg into the skin every 7 days.            SOCIAL HISTORY:     Social History     Socioeconomic History    Marital status: Single    Number of children: 0   Occupational History    Occupation: Retired     Employer: [employer]   Tobacco Use    Smoking status: Never Smoker    Smokeless tobacco: Never Used   Substance and Sexual Activity    Alcohol use: No    Drug use: No    Sexual activity: Yes     Partners: Female     Birth control/protection: Condom       FAMILY HISTORY:     Family History   Adopted: Yes   Problem Relation Age of Onset    Hypertension Mother     Cancer Mother         breast    Diabetes Mother     Hypertension Father     Cataracts Father     Heart disease Father         mi    Diabetes Sister     No Known Problems Brother     Heart disease Sister         MI    No Known Problems Sister     Hypertension Maternal Aunt     No Known Problems Maternal Uncle     No Known Problems Paternal Aunt     No Known Problems Paternal Uncle     No Known Problems Maternal Grandmother     No Known Problems Maternal Grandfather     No Known Problems Paternal Grandmother     No Known Problems Paternal Grandfather     Amblyopia Neg Hx     Blindness Neg Hx     Glaucoma Neg Hx     Macular degeneration Neg Hx     Retinal detachment Neg Hx     Strabismus Neg Hx     Stroke Neg Hx     Thyroid disease Neg  "Hx        REVIEW OF SYSTEMS:   Review of Systems   Constitutional: Negative.   HENT: Negative.    Eyes: Negative.    Cardiovascular: Negative.    Respiratory: Negative.    Endocrine: Negative.    Hematologic/Lymphatic: Negative.    Skin: Positive for color change and poor wound healing.   Musculoskeletal: Negative.    Gastrointestinal: Negative.    Genitourinary: Negative.    Neurological: Negative.    Psychiatric/Behavioral: Negative.    Allergic/Immunologic: Negative.        A 10 point review of systems was performed and all the pertinent positives have been mentioned. Rest of review of systems was negative.        PHYSICAL EXAM:     Vitals:    02/22/22 1430   BP: (!) 156/96   Pulse: 91    Body mass index is 29.03 kg/m².  Weight: 99.8 kg (220 lb)   Height: 6' 1" (185.4 cm)     Physical Exam  Vitals reviewed.   Constitutional:       Appearance: He is well-developed.   HENT:      Head: Normocephalic.   Eyes:      Conjunctiva/sclera: Conjunctivae normal.      Pupils: Pupils are equal, round, and reactive to light.   Cardiovascular:      Rate and Rhythm: Normal rate and regular rhythm.      Heart sounds: Normal heart sounds.   Pulmonary:      Effort: Pulmonary effort is normal.      Breath sounds: Normal breath sounds.   Abdominal:      General: Bowel sounds are normal.      Palpations: Abdomen is soft.   Musculoskeletal:      Cervical back: Normal range of motion and neck supple.      Comments: Left foot in bandage   Skin:     General: Skin is warm.   Neurological:      Mental Status: He is alert and oriented to person, place, and time.           DATA:     Laboratory:  CBC:  Recent Labs   Lab 06/09/21  1422 07/07/21  1216 09/15/21  1111   WBC 6.43 6.24 5.16   Hemoglobin 10.1 L 12.0 L 12.4 L   Hematocrit 32.5 L 38.4 L 40.6   Platelets 314 369 249       CHEMISTRIES:  Recent Labs   Lab 05/01/21  0629 05/02/21  0649 05/03/21  0426 05/11/21  1300 06/09/21  1422 07/07/21  1216 09/15/21  1111   Glucose 105 159 H 251 H  251 " H   < > 289 H 79 135 H   Sodium 140 143 136  136   < > 140 141 139   Potassium 3.5 3.7 3.6  3.6   < > 3.6 4.0 3.9   BUN 8 9 10  10   < > 11 9 11   Creatinine 1.0 0.9 1.0  1.0   < > 1.2 1.1 1.0   eGFR if African American >60.0 >60.0 >60.0  >60.0   < > >60.0 >60.0 >60.0   eGFR if non African American >60.0 >60.0 >60.0  >60.0   < > >60.0 >60.0 >60.0   Calcium 8.4 L 9.0 8.5 L  8.5 L   < > 9.1 10.1 9.7   Magnesium 1.5 L 1.6 1.6  --   --   --   --     < > = values in this interval not displayed.       CARDIAC BIOMARKERS:  Recent Labs   Lab 07/12/19  1037 03/21/20  0124 04/28/21  1222   Troponin I 0.073 H 0.025 0.010       COAGS:        LIPIDS/LFTS:  Recent Labs   Lab 08/26/19  1642 12/03/19  1019 03/05/20  1617 03/29/20  0955 03/30/20  0405 06/12/20  1005 06/09/21  1422 07/07/21  1216 07/14/21  1016 09/15/21  1111   Cholesterol 201 H 190  --   --   --   --   --   --  100 L  --    Triglycerides 91 69   < > 164 H 167 H  --   --   --  61  --    HDL 50 56  --   --   --   --   --   --  35 L  --    LDL Cholesterol 132.8 120.2  --   --   --   --   --   --  52.8 L  --    Non-HDL Cholesterol 151 134  --   --   --   --   --   --  65  --    AST  --  16   < > 19 22   < > 14 15  --  18   ALT  --  17   < > <5 L <5 L   < > 12 11  --  19    < > = values in this interval not displayed.       Hemoglobin A1C   Date Value Ref Range Status   09/15/2021 8.1 (H) 4.0 - 5.6 % Final     Comment:     ADA Screening Guidelines:  5.7-6.4%  Consistent with prediabetes  >or=6.5%  Consistent with diabetes    High levels of fetal hemoglobin interfere with the HbA1C  assay. Heterozygous hemoglobin variants (HbS, HgC, etc)do  not significantly interfere with this assay.   However, presence of multiple variants may affect accuracy.     07/14/2021 10.1 (H) 4.0 - 5.6 % Final     Comment:     ADA Screening Guidelines:  5.7-6.4%  Consistent with prediabetes  >or=6.5%  Consistent with diabetes    High levels of fetal hemoglobin interfere with the  HbA1C  assay. Heterozygous hemoglobin variants (HbS, HgC, etc)do  not significantly interfere with this assay.   However, presence of multiple variants may affect accuracy.     04/28/2021 >14.0 (H) 4.0 - 5.6 % Final     Comment:     ADA Screening Guidelines:  5.7-6.4%  Consistent with prediabetes  >or=6.5%  Consistent with diabetes    High levels of fetal hemoglobin interfere with the HbA1C  assay. Heterozygous hemoglobin variants (HbS, HgC, etc)do  not significantly interfere with this assay.   However, presence of multiple variants may affect accuracy.         TSH        The ASCVD Risk score (Sheffieldnancy CORTES Jr., et al., 2013) failed to calculate for the following reasons:    The valid total cholesterol range is 130 to 320 mg/dL             ASSESSMENT AND PLAN     Patient Active Problem List   Diagnosis    Essential hypertension    Mixed hyperlipidemia    Traumatic amputation of fifth toe of right foot    Type 2 diabetes mellitus with hyperglycemia, with long-term current use of insulin    Actinomyces infection    Hypokalemia    Neck pain    Diabetic ulcer of right midfoot associated with diabetes mellitus due to underlying condition, with fat layer exposed    Severe malnutrition    Hypertensive retinopathy, bilateral    Allergic reaction due to antibacterial drug    Chronic left shoulder pain    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 edema, with long-term current use of insulin    Diabetic ulcer of left foot    Septic arthritis of left foot    Acute on chronic osteomyelitis    Proteus infection       Nonhealing ulcer of left lower extremity.  Further evaluationperipheral vascular ultrasound DID NOT REVEAL ANY SIGNIFICANT STENOSIS.  CONTINUE TO MONITOR.  IF WOUND DOES NOT HEAL, CONSIDER CT ANGIOGRAM/PERIPHERAL ANGIOGRAM.    Uncontrolled hypertension increase lisinopril to 20 mg daily.      Dyspnea on exertion.  Patient states he is  getting short of breath on walking short distances less than 50 ft.  With some heaviness in the chest.  Further evaluation with the help of a stress test and echo.    Statins    Initiate aspirin and Plavix    Follow-up after testing.          Thank you very much for involving me in the care of your patient.  Please do not hesitate to contact me if there are any questions.      Marshall Stahl MD, FACC, [facility]  Interventional Cardiologist, [facility].           This note was dictated with the help of speech recognition software.  There might be un-intended errors and/or substitutions.

## 2022-02-23 ENCOUNTER — HOSPITAL ENCOUNTER (OUTPATIENT)
Dept: WOUND CARE | Facility: HOSPITAL | Age: 52
Discharge: HOME | End: 2022-02-23
Attending: FAMILY MEDICINE
Payer: MEDICARE

## 2022-02-23 VITALS
RESPIRATION RATE: 20 BRPM | TEMPERATURE: 98 F | HEART RATE: 95 BPM | DIASTOLIC BLOOD PRESSURE: 85 MMHG | SYSTOLIC BLOOD PRESSURE: 155 MMHG

## 2022-02-23 DIAGNOSIS — L97.422 DIABETIC ULCER OF LEFT MIDFOOT ASSOCIATED WITH TYPE 2 DIABETES MELLITUS, WITH FAT LAYER EXPOSED: Primary | ICD-10-CM

## 2022-02-23 DIAGNOSIS — E11.621 DIABETIC ULCER OF LEFT MIDFOOT ASSOCIATED WITH TYPE 2 DIABETES MELLITUS, WITH FAT LAYER EXPOSED: Primary | ICD-10-CM

## 2022-02-23 PROCEDURE — 29581 APPL MULTLAYER CMPRN SYS LEG: CPT

## 2022-02-23 NOTE — PROGRESS NOTES
[facility] [address]  [address]  Nurse Visit    Subjective:       Patient seen in clinic today.  Dressing changed as ordered.      Assessment:          Wound 10/25/21 1500 Diabetic Ulcer Left midline;plantar Foot (Active)   10/25/21 1500    Pre-existing: Yes   Primary Wound Type: Diabetic ulc   Side: Left   Orientation: midline;plantar   Location: Foot   Wound Number:    Ankle-Brachial Index:    Pulses:    Removal Indication and Assessment:    Wound Outcome:    (Retired) Wound Type:    (Retired) Wound Length (cm):    (Retired) Wound Width (cm):    (Retired) Depth (cm):    Wound Description (Comments):    Removal Indications:    Wound WDL ex 02/23/22 1500   Dressing Appearance Intact;Moist drainage 02/23/22 1500   Drainage Amount Large 02/23/22 1500   Drainage Characteristics/Odor Serosanguineous 02/23/22 1500   Appearance Red 02/23/22 1500   Tissue loss description Partial thickness 02/23/22 1500   Periwound Area Macerated 02/23/22 1500   Wound Edges Defined 02/23/22 1500   Care Cleansed with:;Soap and water;Sterile normal saline;Antimicrobial agent 02/23/22 1500   Dressing Changed 02/23/22 1500   Periwound Care Moisture barrier applied;Skin barrier film applied 02/23/22 1500   Compression Two layer compression 02/23/22 1500   Off Loading Football dressing;Off loading shoe 02/23/22 1500   Dressing Change Due 02/25/22 02/23/22 1500           Plan:     No orders of the defined types were placed in this encounter.          Follow up in about 2 days (around 2/25/2022) for wound care.

## 2022-02-24 NOTE — TELEPHONE ENCOUNTER
Physical therapy- We made a referral for you to Physical Therapy (PT). Please call PT today or tomorrow to make an appointment at your preferred location.  [facility] - [phone].     [facility] (4th Floor) [facility] - [phone]   [facility] - [phone]    [place] - [phone]    [facility] [phone]    [place] [phone]    [place] [phone]     Repeat CBC today, STAT.  I would like to see the results today.  Please have home health get the labs.

## 2022-02-25 ENCOUNTER — HOSPITAL ENCOUNTER (OUTPATIENT)
Dept: WOUND CARE | Facility: HOSPITAL | Age: 52
Discharge: HOME | End: 2022-02-25
Attending: PODIATRIST
Payer: MEDICARE

## 2022-02-25 VITALS
HEART RATE: 85 BPM | RESPIRATION RATE: 20 BRPM | SYSTOLIC BLOOD PRESSURE: 184 MMHG | TEMPERATURE: 98 F | DIASTOLIC BLOOD PRESSURE: 101 MMHG

## 2022-02-25 DIAGNOSIS — L97.422 DIABETIC ULCER OF LEFT MIDFOOT ASSOCIATED WITH TYPE 2 DIABETES MELLITUS, WITH FAT LAYER EXPOSED: Primary | ICD-10-CM

## 2022-02-25 DIAGNOSIS — E11.621 DIABETIC ULCER OF LEFT MIDFOOT ASSOCIATED WITH TYPE 2 DIABETES MELLITUS, WITH FAT LAYER EXPOSED: Primary | ICD-10-CM

## 2022-02-25 PROCEDURE — 99499 UNLISTED E&M SERVICE: CPT | Mod: ,,, | Performed by: PODIATRIST

## 2022-02-25 PROCEDURE — 99499 NO LOS: ICD-10-PCS | Mod: ,,, | Performed by: PODIATRIST

## 2022-02-25 PROCEDURE — 27201912 HC WOUND CARE DEBRIDEMENT SUPPLIES: Performed by: PODIATRIST

## 2022-02-25 PROCEDURE — 11042 WOUND DEBRIDEMENT: ICD-10-PCS | Mod: ,,, | Performed by: PODIATRIST

## 2022-02-25 PROCEDURE — 11042 DBRDMT SUBQ TIS 1ST 20SQCM/<: CPT | Performed by: PODIATRIST

## 2022-02-25 PROCEDURE — 11042 DBRDMT SUBQ TIS 1ST 20SQCM/<: CPT | Mod: ,,, | Performed by: PODIATRIST

## 2022-02-25 NOTE — PROGRESS NOTES
[facility]                Progress Note    Subjective:       Patient ID: Indio Ryder Jr. is a 51 y.o. male.    Chief Complaint: Wound Check    F/u wound care visit. Patient ambulatory to exam room with no c/o pain or discomfort at present. Wound dressing to LLE intact with moderate amount of drainage noted. Wound to left plantar foot measuring larger length and smaller width. Wound care done per order. RTC on Wednesday 3/2/2022 for nurse visit and 3/4/2022 for MD visit.      Review of Systems   Constitutional: Negative for activity change, appetite change, chills, fatigue and fever.   HENT: Negative for hearing loss.    Eyes: Negative for photophobia and visual disturbance.   Respiratory: Negative for cough, chest tightness, shortness of breath and wheezing.    Cardiovascular: Positive for leg swelling. Negative for chest pain and palpitations.   Gastrointestinal: Negative for constipation, diarrhea, nausea and vomiting.   Endocrine: Negative for cold intolerance and heat intolerance.   Genitourinary: Negative for flank pain.   Musculoskeletal: Positive for gait problem and myalgias. Negative for neck pain and neck stiffness.   Skin: Positive for wound. Negative for color change.   Neurological: Positive for numbness. Negative for weakness, light-headedness and headaches.        + paresthesia    Psychiatric/Behavioral: Negative for sleep disturbance.         Objective:        Physical Exam  Vitals reviewed.   Constitutional:       Appearance: He is well-developed.   Cardiovascular:      Comments: dorsalis pedis and posterior tibial pulses are palpable bilaterally. Capillary refill time is within normal limits. + pedal hair growth         Musculoskeletal:         General: No tenderness. Normal range of motion.      Comments: Normal angle, base, station of gait. All toes without clubbing, cyanosis, or signs of ischemia.  No pain to palpation bilateral lower  extremities.  Range of motion, stability, muscle strength, and muscle tone normal bilateral feet and legs.    Skin:     General: Skin is warm and dry.      Coloration: Skin is not ashen or cyanotic.      Findings: Wound (see below) present. No rash.      Nails: There is no clubbing.   Neurological:      Mental Status: He is alert and oriented to person, place, and time.      Sensory: No sensory deficit.      Comments: Diminished/loss of protective sensation all toes bilateral to 10 gram monofilament.     Psychiatric:         Behavior: Behavior normal.           Vitals:    02/25/22 1052   BP: (!) 184/101   Pulse: 85   Resp: 20   Temp: 97.7 °F (36.5 °C)       Assessment:           ICD-10-CM ICD-9-CM   1. Diabetic ulcer of left midfoot associated with type 2 diabetes mellitus, with fat layer exposed  E11.621 250.80    L97.422 707.14            Wound 10/25/21 1500 Diabetic Ulcer Left midline;plantar Foot (Active)   10/25/21 1500    Pre-existing: Yes   Primary Wound Type: Diabetic ulc   Side: Left   Orientation: midline;plantar   Location: Foot   Wound Number:    Ankle-Brachial Index:    Pulses:    Removal Indication and Assessment:    Wound Outcome:    (Retired) Wound Type:    (Retired) Wound Length (cm):    (Retired) Wound Width (cm):    (Retired) Depth (cm):    Wound Description (Comments):    Removal Indications:    Wound Image   02/25/22 1100   Wound WDL ex 02/25/22 1100   Dressing Appearance Intact;Moist drainage 02/25/22 1100   Drainage Amount Moderate 02/25/22 1100   Drainage Characteristics/Odor Serosanguineous 02/25/22 1100   Appearance Red 02/25/22 1100   Tissue loss description Partial thickness 02/25/22 1100   Black (%), Wound Tissue Color 0 % 02/25/22 1100   Red (%), Wound Tissue Color 100 % 02/25/22 1100   Yellow (%), Wound Tissue Color 0 % 02/25/22 1100   Periwound Area Macerated 02/25/22 1100   Wound Edges Defined 02/25/22 1100   Wound Length (cm) 4.2 cm 02/25/22 1100   Wound Width (cm) 2.1 cm 02/25/22  "1100   Wound Depth (cm) 0.3 cm 02/25/22 1100   Wound Volume (cm^3) 2.646 cm^3 02/25/22 1100   Wound Surface Area (cm^2) 8.82 cm^2 02/25/22 1100   Care Cleansed with:;Soap and water;Sterile normal saline 02/25/22 1100   Dressing Changed 02/25/22 1100   Periwound Care Moisture barrier applied 02/25/22 1100   Compression Two layer compression 02/25/22 1100   Off Loading Football dressing;Off loading shoe 02/25/22 1100   Dressing Change Due 03/02/22 02/25/22 1100           Plan:            Wound Debridement    Date/Time: 2/25/2022 10:41 AM  Performed by: Marivel Peterson DPM  Authorized by: Marivel Peterson DPM     Time out: Immediately prior to procedure a "time out" was called to verify the correct patient, procedure, equipment, support staff and site/side marked as required.    Consent Done?:  Yes (Written)    Preparation: Patient was prepped and draped in usual sterile fashion    Local anesthesia used?: No      Wound Details:    Location:  Left foot    Location:  Left Midfoot    Type of Debridement:  Excisional       Length (cm):  4.2       Area (sq cm):  8.82       Width (cm):  2.1       Percent Debrided (%):  100       Depth (cm):  0.3       Total Area Debrided (sq cm):  8.82    Depth of debridement:  Subcutaneous tissue    Tissue debrided:  Subcutaneous, Epidermis and Dermis    Devitalized tissue debrided:  Fibrin and Callus    Instruments:  Curette    Bleeding:  Minimal  Hemostasis Achieved: Yes    Method Used:  Pressure  Patient tolerance:  Patient tolerated the procedure well with no immediate complications        Orders Placed This Encounter   Procedures    Change dressing     Clean with saline. Cavilon/Gentian violet periwound. Custom-cut felt pad to plantar foot with small hole over wound bed. Apply Iodosorb to wound bed. Cover with Drawtex cut-to-fit x 2, Mextra long x1 secured with Medipore tape. Football dressing (cast padding x 3), Coflex Calamine. CAM Boot. Change during nurse visit.        Follow up in " about 5 days (around 3/2/2022) for nurse visit.

## 2022-02-28 ENCOUNTER — HOSPITAL ENCOUNTER (OUTPATIENT)
Dept: CARDIOLOGY | Facility: HOSPITAL | Age: 52
Discharge: HOME | End: 2022-02-28
Attending: INTERNAL MEDICINE
Payer: MEDICARE

## 2022-02-28 ENCOUNTER — HOSPITAL ENCOUNTER (OUTPATIENT)
Dept: RADIOLOGY | Facility: HOSPITAL | Age: 52
Discharge: HOME | End: 2022-02-28
Attending: INTERNAL MEDICINE
Payer: MEDICARE

## 2022-02-28 VITALS — WEIGHT: 220 LBS | BODY MASS INDEX: 29.16 KG/M2 | HEIGHT: 73 IN

## 2022-02-28 DIAGNOSIS — R06.02 SOB (SHORTNESS OF BREATH): ICD-10-CM

## 2022-02-28 DIAGNOSIS — R06.09 DOE (DYSPNEA ON EXERTION): ICD-10-CM

## 2022-02-28 LAB
AV INDEX (PROSTH): 0.81
AV MEAN GRADIENT: 3 MMHG
AV PEAK GRADIENT: 5 MMHG
AV VALVE AREA: 3.24 CM2
AV VELOCITY RATIO: 0.79
BSA FOR ECHO PROCEDURE: 2.27 M2
CV ECHO LV RWT: 0.3 CM
CV STRESS BASE HR: 84 BPM
DIASTOLIC BLOOD PRESSURE: 97 MMHG
DOP CALC AO PEAK VEL: 1.07 M/S
DOP CALC AO VTI: 21.54 CM
DOP CALC LVOT AREA: 4 CM2
DOP CALC LVOT DIAMETER: 2.25 CM
DOP CALC LVOT PEAK VEL: 0.84 M/S
DOP CALC LVOT STROKE VOLUME: 69.71 CM3
DOP CALCLVOT PEAK VEL VTI: 17.54 CM
E WAVE DECELERATION TIME: 198.59 MSEC
E/A RATIO: 0.74
E/E' RATIO: 8.43 M/S
ECHO LV POSTERIOR WALL: 0.84 CM (ref 0.6–1.1)
EJECTION FRACTION: 50 %
FRACTIONAL SHORTENING: 32 % (ref 28–44)
INTERVENTRICULAR SEPTUM: 1.6 CM (ref 0.6–1.1)
IVRT: 131.49 MSEC
LA MAJOR: 5.66 CM
LA MINOR: 5.67 CM
LA WIDTH: 4.3 CM
LEFT ATRIUM SIZE: 3.92 CM
LEFT ATRIUM VOLUME INDEX: 36.2 ML/M2
LEFT ATRIUM VOLUME: 81.17 CM3
LEFT INTERNAL DIMENSION IN SYSTOLE: 3.78 CM (ref 2.1–4)
LEFT VENTRICLE DIASTOLIC VOLUME INDEX: 67.44 ML/M2
LEFT VENTRICLE DIASTOLIC VOLUME: 151.07 ML
LEFT VENTRICLE MASS INDEX: 127 G/M2
LEFT VENTRICLE SYSTOLIC VOLUME INDEX: 27.4 ML/M2
LEFT VENTRICLE SYSTOLIC VOLUME: 61.33 ML
LEFT VENTRICULAR INTERNAL DIMENSION IN DIASTOLE: 5.56 CM (ref 3.5–6)
LEFT VENTRICULAR MASS: 283.58 G
LV LATERAL E/E' RATIO: 5.9 M/S
LV SEPTAL E/E' RATIO: 14.75 M/S
MV PEAK A VEL: 0.8 M/S
MV PEAK E VEL: 0.59 M/S
NUC REST EJECTION FRACTION: 50
OHS CV CPX 85 PERCENT MAX PREDICTED HEART RATE MALE: 142
OHS CV CPX MAX PREDICTED HEART RATE: 167
OHS CV CPX PATIENT IS FEMALE: 0
OHS CV CPX PATIENT IS MALE: 1
OHS CV CPX PEAK DIASTOLIC BLOOD PRESSURE: 65 MMHG
OHS CV CPX PEAK HEAR RATE: 105 BPM
OHS CV CPX PEAK RATE PRESSURE PRODUCT: NORMAL
OHS CV CPX PEAK SYSTOLIC BLOOD PRESSURE: 128 MMHG
OHS CV CPX PERCENT MAX PREDICTED HEART RATE ACHIEVED: 63
OHS CV CPX RATE PRESSURE PRODUCT PRESENTING: NORMAL
PISA TR MAX VEL: 2.13 M/S
PULM VEIN S/D RATIO: 1.31
PV PEAK D VEL: 0.35 M/S
PV PEAK S VEL: 0.46 M/S
PV PEAK VELOCITY: 0.84 CM/S
RA MAJOR: 5.84 CM
RA PRESSURE: 3 MMHG
RA WIDTH: 4.71 CM
RIGHT VENTRICULAR END-DIASTOLIC DIMENSION: 3.43 CM
SINUS: 3.62 CM
STJ: 3.38 CM
SYSTOLIC BLOOD PRESSURE: 137 MMHG
TDI LATERAL: 0.1 M/S
TDI SEPTAL: 0.04 M/S
TDI: 0.07 M/S
TR MAX PG: 18 MMHG
TRICUSPID ANNULAR PLANE SYSTOLIC EXCURSION: 1.8 CM
TV REST PULMONARY ARTERY PRESSURE: 21 MMHG

## 2022-02-28 PROCEDURE — 93017 CV STRESS TEST TRACING ONLY: CPT

## 2022-02-28 PROCEDURE — 63600175 PHARM REV CODE 636 W HCPCS: Performed by: INTERNAL MEDICINE

## 2022-02-28 PROCEDURE — 93016 CV STRESS TEST SUPVJ ONLY: CPT | Mod: ,,, | Performed by: INTERNAL MEDICINE

## 2022-02-28 PROCEDURE — 93018 CV STRESS TEST I&R ONLY: CPT | Mod: ,,, | Performed by: INTERNAL MEDICINE

## 2022-02-28 PROCEDURE — 78452 STRESS TEST WITH MYOCARDIAL PERFUSION (CUPID ONLY): ICD-10-PCS | Mod: 26,,, | Performed by: INTERNAL MEDICINE

## 2022-02-28 PROCEDURE — 93306 TTE W/DOPPLER COMPLETE: CPT | Mod: 26,,, | Performed by: INTERNAL MEDICINE

## 2022-02-28 PROCEDURE — 93306 ECHO (CUPID ONLY): ICD-10-PCS | Mod: 26,,, | Performed by: INTERNAL MEDICINE

## 2022-02-28 PROCEDURE — 93018 STRESS TEST WITH MYOCARDIAL PERFUSION (CUPID ONLY): ICD-10-PCS | Mod: ,,, | Performed by: INTERNAL MEDICINE

## 2022-02-28 PROCEDURE — 93016 STRESS TEST WITH MYOCARDIAL PERFUSION (CUPID ONLY): ICD-10-PCS | Mod: ,,, | Performed by: INTERNAL MEDICINE

## 2022-02-28 PROCEDURE — 93306 TTE W/DOPPLER COMPLETE: CPT

## 2022-02-28 PROCEDURE — A9502 TC99M TETROFOSMIN: HCPCS

## 2022-02-28 PROCEDURE — 78452 HT MUSCLE IMAGE SPECT MULT: CPT | Mod: 26,,, | Performed by: INTERNAL MEDICINE

## 2022-02-28 RX ORDER — REGADENOSON 0.08 MG/ML
0.4 INJECTION, SOLUTION INTRAVENOUS ONCE
Status: COMPLETED | OUTPATIENT
Start: 2022-02-28 | End: 2022-02-28

## 2022-02-28 RX ADMIN — REGADENOSON 0.4 MG: 0.08 INJECTION, SOLUTION INTRAVENOUS at 08:02

## 2022-03-02 ENCOUNTER — HOSPITAL ENCOUNTER (OUTPATIENT)
Dept: WOUND CARE | Facility: HOSPITAL | Age: 52
Discharge: HOME | End: 2022-03-02
Attending: FAMILY MEDICINE
Payer: MEDICARE

## 2022-03-02 VITALS
RESPIRATION RATE: 20 BRPM | HEART RATE: 97 BPM | TEMPERATURE: 98 F | SYSTOLIC BLOOD PRESSURE: 155 MMHG | DIASTOLIC BLOOD PRESSURE: 82 MMHG

## 2022-03-02 DIAGNOSIS — E11.621 DIABETIC ULCER OF LEFT MIDFOOT ASSOCIATED WITH TYPE 2 DIABETES MELLITUS, WITH FAT LAYER EXPOSED: Primary | ICD-10-CM

## 2022-03-02 DIAGNOSIS — L97.422 DIABETIC ULCER OF LEFT MIDFOOT ASSOCIATED WITH TYPE 2 DIABETES MELLITUS, WITH FAT LAYER EXPOSED: Primary | ICD-10-CM

## 2022-03-02 PROCEDURE — 29581 APPL MULTLAYER CMPRN SYS LEG: CPT

## 2022-03-02 NOTE — PROGRESS NOTES
[facility]  [address] [place]  [address]  Nurse Visit    Subjective:       Patient seen in clinic today.  Dressing changed as ordered.      Assessment:          Wound 10/25/21 1500 Diabetic Ulcer Left midline;plantar Foot (Active)   10/25/21 1500    Pre-existing: Yes   Primary Wound Type: Diabetic ulc   Side: Left   Orientation: midline;plantar   Location: Foot   Wound Number:    Ankle-Brachial Index:    Pulses:    Removal Indication and Assessment:    Wound Outcome:    (Retired) Wound Type:    (Retired) Wound Length (cm):    (Retired) Wound Width (cm):    (Retired) Depth (cm):    Wound Description (Comments):    Removal Indications:    Wound WDL ex 03/02/22 1700   Dressing Appearance Intact;Moist drainage 03/02/22 1700   Drainage Amount Large 03/02/22 1700   Drainage Characteristics/Odor Serosanguineous;Malodorous 03/02/22 1700   Appearance Pink;Red 03/02/22 1700   Tissue loss description Partial thickness 03/02/22 1700   Black (%), Wound Tissue Color 0 % 03/02/22 1700   Red (%), Wound Tissue Color 100 % 03/02/22 1700   Yellow (%), Wound Tissue Color 0 % 03/02/22 1700   Periwound Area Macerated;Pale white 03/02/22 1700   Wound Edges Defined 03/02/22 1700   Care Cleansed with:;Soap and water;Sterile normal saline 03/02/22 1700   Dressing Changed 03/02/22 1700   Periwound Care Moisture barrier applied 03/02/22 1700   Compression Two layer compression 03/02/22 1700   Dressing Change Due 03/04/22 03/02/22 1700           Plan:     No orders of the defined types were placed in this encounter.          Follow up in about 2 days (around 3/4/2022) for wound care.

## 2022-03-03 RX ORDER — INSULIN DETEMIR 100 [IU]/ML
20 INJECTION, SOLUTION SUBCUTANEOUS NIGHTLY
Qty: 3 ML | Refills: 0 | Status: CANCELLED | OUTPATIENT
Start: 2022-03-03 | End: 2023-03-03

## 2022-03-04 ENCOUNTER — HOSPITAL ENCOUNTER (OUTPATIENT)
Dept: WOUND CARE | Facility: HOSPITAL | Age: 52
Discharge: HOME | End: 2022-03-04
Attending: PODIATRIST
Payer: MEDICARE

## 2022-03-04 VITALS
HEIGHT: 73 IN | TEMPERATURE: 98 F | SYSTOLIC BLOOD PRESSURE: 177 MMHG | DIASTOLIC BLOOD PRESSURE: 103 MMHG | BODY MASS INDEX: 29.16 KG/M2 | WEIGHT: 220 LBS

## 2022-03-04 DIAGNOSIS — L97.422 DIABETIC ULCER OF LEFT MIDFOOT ASSOCIATED WITH TYPE 2 DIABETES MELLITUS, WITH FAT LAYER EXPOSED: Primary | ICD-10-CM

## 2022-03-04 DIAGNOSIS — E11.621 DIABETIC ULCER OF LEFT MIDFOOT ASSOCIATED WITH TYPE 2 DIABETES MELLITUS, WITH FAT LAYER EXPOSED: Primary | ICD-10-CM

## 2022-03-04 PROCEDURE — 99213 PR OFFICE/OUTPT VISIT, EST, LEVL III, 20-29 MIN: ICD-10-PCS | Mod: ,,, | Performed by: PODIATRIST

## 2022-03-04 PROCEDURE — 99213 OFFICE O/P EST LOW 20 MIN: CPT | Performed by: PODIATRIST

## 2022-03-04 PROCEDURE — 29581 APPL MULTLAYER CMPRN SYS LEG: CPT

## 2022-03-04 PROCEDURE — 99213 OFFICE O/P EST LOW 20 MIN: CPT | Mod: ,,, | Performed by: PODIATRIST

## 2022-03-04 RX ORDER — CIPROFLOXACIN 250 MG/1
250 TABLET, FILM COATED ORAL EVERY 12 HOURS
Qty: 40 TABLET | Refills: 0 | Status: SHIPPED | OUTPATIENT
Start: 2022-03-04 | End: 2022-03-27

## 2022-03-04 NOTE — PROGRESS NOTES
[facility]                Progress Note    Subjective:       Patient ID: Indio Ryder Jr. is a 51 y.o. male.    Chief Complaint: No chief complaint on file.    F/u wound care visit. Patient ambulatory to exam room with no c/o pain or discomfort at present. Wound dressing to LLE intact with large amount of drainage noted.       Review of Systems   Constitutional: Negative for activity change, appetite change, chills, fatigue and fever.   HENT: Negative for hearing loss.    Eyes: Negative for photophobia and visual disturbance.   Respiratory: Negative for cough, chest tightness, shortness of breath and wheezing.    Cardiovascular: Positive for leg swelling. Negative for chest pain and palpitations.   Gastrointestinal: Negative for constipation, diarrhea, nausea and vomiting.   Endocrine: Negative for cold intolerance and heat intolerance.   Genitourinary: Negative for flank pain.   Musculoskeletal: Positive for gait problem and myalgias. Negative for neck pain and neck stiffness.   Skin: Positive for wound. Negative for color change.   Neurological: Positive for numbness. Negative for weakness, light-headedness and headaches.        + paresthesia    Psychiatric/Behavioral: Negative for sleep disturbance.         Objective:        Physical Exam  Vitals reviewed.   Constitutional:       Appearance: He is well-developed.   Cardiovascular:      Comments: dorsalis pedis and posterior tibial pulses are palpable bilaterally. Capillary refill time is within normal limits. + pedal hair growth         Musculoskeletal:         General: No tenderness. Normal range of motion.      Comments: Normal angle, base, station of gait. All toes without clubbing, cyanosis, or signs of ischemia.  No pain to palpation bilateral lower extremities.  Range of motion, stability, muscle strength, and muscle tone normal bilateral feet and legs.    Skin:     General: Skin is warm and dry.       Coloration: Skin is not ashen or cyanotic.      Findings: Wound (see below) present. No rash.      Nails: There is no clubbing.   Neurological:      Mental Status: He is alert and oriented to person, place, and time.      Sensory: No sensory deficit.      Comments: Diminished/loss of protective sensation all toes bilateral to 10 gram monofilament.     Psychiatric:         Behavior: Behavior normal.         Vitals:    03/04/22 1114   BP: (!) 177/103   Temp: 98.1 °F (36.7 °C)       Assessment:           ICD-10-CM ICD-9-CM   1. Diabetic ulcer of left midfoot associated with type 2 diabetes mellitus, with fat layer exposed  E11.621 250.80    L97.422 707.14            Wound 10/25/21 1500 Diabetic Ulcer Left midline;plantar Foot (Active)   10/25/21 1500    Pre-existing: Yes   Primary Wound Type: Diabetic ulc   Side: Left   Orientation: midline;plantar   Location: Foot   Wound Number:    Ankle-Brachial Index:    Pulses:    Removal Indication and Assessment:    Wound Outcome:    (Retired) Wound Type:    (Retired) Wound Length (cm):    (Retired) Wound Width (cm):    (Retired) Depth (cm):    Wound Description (Comments):    Removal Indications:    Wound Image   03/04/22 1100   Wound WDL ex 03/04/22 1100   Dressing Appearance Intact;Moist drainage 03/04/22 1100   Drainage Amount Large 03/04/22 1100   Drainage Characteristics/Odor Serosanguineous 03/04/22 1100   Appearance Red 03/04/22 1100   Tissue loss description Partial thickness 03/04/22 1100   Black (%), Wound Tissue Color 0 % 03/04/22 1100   Red (%), Wound Tissue Color 100 % 03/04/22 1100   Yellow (%), Wound Tissue Color 0 % 03/04/22 1100   Periwound Area Macerated 03/04/22 1100   Wound Edges Defined 03/04/22 1100   Wound Length (cm) 4 cm 03/04/22 1100   Wound Width (cm) 2.5 cm 03/04/22 1100   Wound Depth (cm) 0.5 cm 03/04/22 1100   Wound Volume (cm^3) 5 cm^3 03/04/22 1100   Wound Surface Area (cm^2) 10 cm^2 03/04/22 1100   Care Cleansed with:;Soap and water;Sterile  normal saline 03/04/22 1100   Dressing Changed 03/04/22 1100   Periwound Care Moisture barrier applied;Skin barrier film applied 03/04/22 1100   Compression Two layer compression 03/04/22 1100   Off Loading Football dressing;Off loading shoe 03/04/22 1100   Dressing Change Due 03/08/22 03/04/22 1100           Plan:            Wound measuring larger width and depth but smaller length.    Encouraged continued decrease in excessive ambulation    RTC on Tuesday 3/8/22 for nurse visit and one week for MD visit.        Orders Placed This Encounter   Procedures    Change dressing     Clean with saline. Cavilon/Gentian violet periwound. Custom-cut felt pad to plantar foot with small hole over wound bed. Apply Iodosorb to wound bed. Cover with Drawtex cut-to-fit x 2, Mextra long x1 secured with Medipore tape. Football dressing (cast padding x 3), Coflex Calamine. CAM Boot. Change during nurse visit.        Follow up in about 1 week (around 3/11/2022) for wound care.

## 2022-03-08 ENCOUNTER — OFFICE VISIT (OUTPATIENT)
Dept: CARDIOLOGY | Facility: CLINIC | Age: 52
End: 2022-03-08
Payer: MEDICARE

## 2022-03-08 ENCOUNTER — HOSPITAL ENCOUNTER (OUTPATIENT)
Dept: WOUND CARE | Facility: HOSPITAL | Age: 52
Discharge: HOME | End: 2022-03-08
Attending: FAMILY MEDICINE
Payer: MEDICARE

## 2022-03-08 VITALS
SYSTOLIC BLOOD PRESSURE: 137 MMHG | SYSTOLIC BLOOD PRESSURE: 165 MMHG | HEIGHT: 73 IN | RESPIRATION RATE: 15 BRPM | WEIGHT: 228.63 LBS | DIASTOLIC BLOOD PRESSURE: 93 MMHG | BODY MASS INDEX: 30.3 KG/M2 | DIASTOLIC BLOOD PRESSURE: 88 MMHG | OXYGEN SATURATION: 99 % | HEART RATE: 86 BPM

## 2022-03-08 DIAGNOSIS — E43 SEVERE MALNUTRITION: ICD-10-CM

## 2022-03-08 DIAGNOSIS — E11.65 TYPE 2 DIABETES MELLITUS WITH HYPERGLYCEMIA, WITH LONG-TERM CURRENT USE OF INSULIN: ICD-10-CM

## 2022-03-08 DIAGNOSIS — E08.621 DIABETIC ULCER OF RIGHT MIDFOOT ASSOCIATED WITH DIABETES MELLITUS DUE TO UNDERLYING CONDITION, WITH FAT LAYER EXPOSED: ICD-10-CM

## 2022-03-08 DIAGNOSIS — M54.2 NECK PAIN: ICD-10-CM

## 2022-03-08 DIAGNOSIS — L97.422 DIABETIC ULCER OF LEFT MIDFOOT ASSOCIATED WITH TYPE 2 DIABETES MELLITUS, WITH FAT LAYER EXPOSED: Primary | ICD-10-CM

## 2022-03-08 DIAGNOSIS — E87.6 HYPOKALEMIA: ICD-10-CM

## 2022-03-08 DIAGNOSIS — H35.033 HYPERTENSIVE RETINOPATHY, BILATERAL: ICD-10-CM

## 2022-03-08 DIAGNOSIS — E11.621 DIABETIC ULCER OF LEFT HEEL ASSOCIATED WITH TYPE 2 DIABETES MELLITUS, WITH FAT LAYER EXPOSED: ICD-10-CM

## 2022-03-08 DIAGNOSIS — I10 ESSENTIAL HYPERTENSION: Primary | ICD-10-CM

## 2022-03-08 DIAGNOSIS — E78.2 MIXED HYPERLIPIDEMIA: ICD-10-CM

## 2022-03-08 DIAGNOSIS — E11.621 DIABETIC ULCER OF LEFT MIDFOOT ASSOCIATED WITH TYPE 2 DIABETES MELLITUS, WITH FAT LAYER EXPOSED: Primary | ICD-10-CM

## 2022-03-08 DIAGNOSIS — L97.412 DIABETIC ULCER OF RIGHT MIDFOOT ASSOCIATED WITH DIABETES MELLITUS DUE TO UNDERLYING CONDITION, WITH FAT LAYER EXPOSED: ICD-10-CM

## 2022-03-08 DIAGNOSIS — Z79.4 TYPE 2 DIABETES MELLITUS WITH HYPERGLYCEMIA, WITH LONG-TERM CURRENT USE OF INSULIN: ICD-10-CM

## 2022-03-08 DIAGNOSIS — S98.131S: ICD-10-CM

## 2022-03-08 DIAGNOSIS — T36.95XA ALLERGIC REACTION DUE TO ANTIBACTERIAL DRUG: ICD-10-CM

## 2022-03-08 DIAGNOSIS — L97.422 DIABETIC ULCER OF LEFT HEEL ASSOCIATED WITH TYPE 2 DIABETES MELLITUS, WITH FAT LAYER EXPOSED: ICD-10-CM

## 2022-03-08 PROCEDURE — 99999 PR PBB SHADOW E&M-EST. PATIENT-LVL V: ICD-10-PCS | Mod: PBBFAC,,, | Performed by: INTERNAL MEDICINE

## 2022-03-08 PROCEDURE — 99215 OFFICE O/P EST HI 40 MIN: CPT | Mod: PBBFAC,25 | Performed by: INTERNAL MEDICINE

## 2022-03-08 PROCEDURE — 99999 PR PBB SHADOW E&M-EST. PATIENT-LVL V: CPT | Mod: PBBFAC,,, | Performed by: INTERNAL MEDICINE

## 2022-03-08 PROCEDURE — 99214 PR OFFICE/OUTPT VISIT, EST, LEVL IV, 30-39 MIN: ICD-10-PCS | Mod: S$PBB,,, | Performed by: INTERNAL MEDICINE

## 2022-03-08 PROCEDURE — 29581 APPL MULTLAYER CMPRN SYS LEG: CPT

## 2022-03-08 PROCEDURE — 99214 OFFICE O/P EST MOD 30 MIN: CPT | Mod: S$PBB,,, | Performed by: INTERNAL MEDICINE

## 2022-03-08 NOTE — PROGRESS NOTES
CARDIOVASCULAR CONSULTATION    REASON FOR CONSULT:   Indio Ryder Jr. is a 51 y.o. male who presents for evaluation.      HISTORY OF PRESENT ILLNESS:     Patient is a pleasant 51-year-old man.  Has been referred from Podiatry for further evaluation of severe peripheral vascular disease.  Holter wound bandage.  States wound is not wound.  Denies any chest pains at rest exertion,      Feb 22:  Patient here for follow-up.  States that the wound seems to be healing well.  Ultrasound did not reveal any significant PAD      No evidence of hemodynamically significant infrainguinal PAD bilaterally.  Predominantly biphasic waveforms throughout.  Normal ALISSA bilaterally.      No evidence of AAA      Normal ALISSA bilaterally.  Normal PVR waveforms on right.  Mildly dampened PVR waveforms on left.       Mar 22:  Patient is status echo and stress test showed normal left ventricle systolic function.    Normal myocardial perfusion scan. There is no evidence of myocardial ischemia or infarction.    There is a  mild intensity fixed perfusion abnormality in the inferior wall of the left ventricle secondary to diaphragm attenuation.    The gated perfusion images showed an ejection fraction of 50% at rest.    The EKG portion of this study is negative for ischemia.    The patient reported no chest pain during the stress test.    There were no arrhythmias during stress.      · Eccentric hypertrophy and low normal systolic function.  · Mild to moderate left atrial enlargement.  · Mild tricuspid regurgitation.  · The estimated ejection fraction is 50%.  · Grade I left ventricular diastolic dysfunction.  · Normal right ventricular size with normal right ventricular systolic function.  · Mild right atrial enlargement.  · Normal central venous pressure (3 mmHg).  · The estimated PA systolic pressure is 21 mmHg.              PAST MEDICAL HISTORY:     Past Medical History:   Diagnosis Date    Actinomyces infection 1/17/2017    Right  foot    Diabetic ketoacidosis without coma associated with type 2 diabetes mellitus 5/30/2017    Diabetic ulcer of right foot associated with type 2 diabetes mellitus 6/3/2015    Disorder of kidney and ureter     Essential hypertension 6/6/2013    Group B streptococcal infection 1/13/2017    Mixed hyperlipidemia 8/12/2014    Septic arthritis of left foot 4/28/2021    Shoulder impingement 8/12/2014    Subacute osteomyelitis of right foot 1/12/2017    Streptococcus agalactiae, Actinomyces odontolyticus    Traumatic amputation of fifth toe of right foot 07/02/2015    Type 2 diabetes mellitus with diabetic neuropathy, with long-term current use of insulin 5/3/2016    Ulcerative colitis, unspecified        PAST SURGICAL HISTORY:     Past Surgical History:   Procedure Laterality Date    COLONOSCOPY Right 1/19/2022    Procedure: COLONOSCOPY;  Surgeon: Tj Haile MD;  Location: [facility] (4TH FLR);  Service: Endoscopy;  Laterality: Right;  previous order un-usable/poor prep 1/18/22  RAPID COVID test arrival 12:20  instructions handed to pt- sm    DEBRIDEMENT OF FOOT Left 7/14/2019    Procedure: DEBRIDEMENT, FOOT, with left 5th ray amputation;  Surgeon: Sis Hickman DPM;  Location: [facility] OR 2ND FLR;  Service: Podiatry;  Laterality: Left;    DEBRIDEMENT OF FOOT Left 7/17/2019    Procedure: DEBRIDEMENT, FOOT with leftt 5th ray partial amputation with Neox Graft;  Surgeon: Mai Burrell DPM;  Location: [facility] OR 2ND FLR;  Service: Podiatry;  Laterality: Left;  t    DEBRIDEMENT OF FOOT Bilateral 4/29/2021    Procedure: DEBRIDEMENT, FOOT;  Surgeon: Mai Burrell DPM;  Location: [facility] OR 1ST FLR;  Service: Podiatry;  Laterality: Bilateral;  Graft application    TOE AMPUTATION Right 06/05/2015    TOE AMPUTATION Right 01/19/2017       ALLERGIES AND MEDICATION:   Review of patient's allergies indicates:  No Known Allergies     Medication List          Accurate as of March 8, 2022  9:58 AM. If  "you have any questions, ask your nurse or doctor.            CONTINUE taking these medications    acetaminophen 325 MG tablet  Commonly known as: TYLENOL  Take 2 tablets (650 mg total) by mouth every 6 (six) hours as needed.     aspirin 81 MG Chew  Chew and swallow 1 tablet (81 mg total) by mouth once daily.     atorvastatin 80 MG tablet  Commonly known as: LIPITOR  Take 1 tablet (80 mg total) by mouth once daily.     BD ULTRA-FINE SASCHA PEN NEEDLE 32 gauge x 5/32" Ndle  Generic drug: pen needle, diabetic  Use 4 times a day     ciprofloxacin HCl 250 MG tablet  Commonly known as: CIPRO  Take 1 tablet (250 mg total) by mouth every 12 (twelve) hours. for 20 days     clopidogreL 75 mg tablet  Commonly known as: PLAVIX  Take 1 tablet (75 mg total) by mouth once daily.     flu vacc [id number] 6mos up(PF) 60 mcg (15 mcg x 4)/0.5 mL Syrg  inject IM by [facility]     insulin aspart U-100 100 unit/mL (3 mL) Inpn pen  Commonly known as: NovoLOG  Inject 8 units before meals plus scale 150-200+2, 201-250+4, 251-300+6, 301-350+8, >350+10. Max daily 54 units.     LEVEMIR FLEXTOUCH U-100 INSULN 100 unit/mL (3 mL) Inpn pen  Generic drug: insulin detemir U-100  Inject 24 Units into the skin every evening.     lisinopriL 20 MG tablet  Commonly known as: PRINIVIL,ZESTRIL  Take 1 tablet (20 mg total) by mouth once daily.     metFORMIN 500 MG tablet  Commonly known as: GLUCOPHAGE  Take 2 tablets by mouth in the morning, and take 2 tablets by mouth at night.     ONETOUCH DELICA LANCETS 33 gauge Misc  Generic drug: lancets     ONETOUCH ULTRA2 METER kit  Generic drug: blood-glucose meter     permethrin 5 % cream  Commonly known as: ELIMITE  Apply neck down at night for 1 application. Wash off in morning and Repeat in 1 week     TRULICITY 1.5 mg/0.5 mL pen injector  Generic drug: dulaglutide  Inject 1.5 mg into the skin every 7 days.            SOCIAL HISTORY:     Social History     Socioeconomic History    Marital status: Single    Number of " children: 0   Occupational History    Occupation: Retired     Employer: [employer]   Tobacco Use    Smoking status: Never Smoker    Smokeless tobacco: Never Used   Substance and Sexual Activity    Alcohol use: No    Drug use: No    Sexual activity: Yes     Partners: Female     Birth control/protection: Condom       FAMILY HISTORY:     Family History   Adopted: Yes   Problem Relation Age of Onset    Hypertension Mother     Cancer Mother         breast    Diabetes Mother     Hypertension Father     Cataracts Father     Heart disease Father         mi    Diabetes Sister     No Known Problems Brother     Heart disease Sister         MI    No Known Problems Sister     Hypertension Maternal Aunt     No Known Problems Maternal Uncle     No Known Problems Paternal Aunt     No Known Problems Paternal Uncle     No Known Problems Maternal Grandmother     No Known Problems Maternal Grandfather     No Known Problems Paternal Grandmother     No Known Problems Paternal Grandfather     Amblyopia Neg Hx     Blindness Neg Hx     Glaucoma Neg Hx     Macular degeneration Neg Hx     Retinal detachment Neg Hx     Strabismus Neg Hx     Stroke Neg Hx     Thyroid disease Neg Hx        REVIEW OF SYSTEMS:   Review of Systems   Constitutional: Negative.   HENT: Negative.    Eyes: Negative.    Cardiovascular: Negative.    Respiratory: Negative.    Endocrine: Negative.    Hematologic/Lymphatic: Negative.    Skin: Positive for color change and poor wound healing.   Musculoskeletal: Negative.    Gastrointestinal: Negative.    Genitourinary: Negative.    Neurological: Negative.    Psychiatric/Behavioral: Negative.    Allergic/Immunologic: Negative.        A 10 point review of systems was performed and all the pertinent positives have been mentioned. Rest of review of systems was negative.        PHYSICAL EXAM:     Vitals:    03/08/22 0844   BP: 137/88   Pulse: 86   Resp: 15    Body mass index is 30.16  "kg/m².  Weight: 103.7 kg (228 lb 9.9 oz)   Height: 6' 1" (185.4 cm)     Physical Exam  Vitals reviewed.   Constitutional:       Appearance: He is well-developed.   HENT:      Head: Normocephalic.   Eyes:      Conjunctiva/sclera: Conjunctivae normal.      Pupils: Pupils are equal, round, and reactive to light.   Cardiovascular:      Rate and Rhythm: Normal rate and regular rhythm.      Heart sounds: Normal heart sounds.   Pulmonary:      Effort: Pulmonary effort is normal.      Breath sounds: Normal breath sounds.   Abdominal:      General: Bowel sounds are normal.      Palpations: Abdomen is soft.   Musculoskeletal:      Cervical back: Normal range of motion and neck supple.      Comments: Left foot in bandage   Skin:     General: Skin is warm.   Neurological:      Mental Status: He is alert and oriented to person, place, and time.           DATA:     Laboratory:  CBC:  Recent Labs   Lab 06/09/21  1422 07/07/21  1216 09/15/21  1111   WBC 6.43 6.24 5.16   Hemoglobin 10.1 L 12.0 L 12.4 L   Hematocrit 32.5 L 38.4 L 40.6   Platelets 314 369 249       CHEMISTRIES:  Recent Labs   Lab 05/01/21  0629 05/02/21  0649 05/03/21  0426 05/11/21  1300 06/09/21  1422 07/07/21  1216 09/15/21  1111   Glucose 105 159 H 251 H  251 H   < > 289 H 79 135 H   Sodium 140 143 136  136   < > 140 141 139   Potassium 3.5 3.7 3.6  3.6   < > 3.6 4.0 3.9   BUN 8 9 10  10   < > 11 9 11   Creatinine 1.0 0.9 1.0  1.0   < > 1.2 1.1 1.0   eGFR if African American >60.0 >60.0 >60.0  >60.0   < > >60.0 >60.0 >60.0   eGFR if non African American >60.0 >60.0 >60.0  >60.0   < > >60.0 >60.0 >60.0   Calcium 8.4 L 9.0 8.5 L  8.5 L   < > 9.1 10.1 9.7   Magnesium 1.5 L 1.6 1.6  --   --   --   --     < > = values in this interval not displayed.       CARDIAC BIOMARKERS:  Recent Labs   Lab 07/12/19  1037 03/21/20  0124 04/28/21  1222   Troponin I 0.073 H 0.025 0.010       COAGS:        LIPIDS/LFTS:  Recent Labs   Lab 08/26/19  1642 12/03/19  1019 " 03/05/20  1617 03/29/20  0955 03/30/20  0405 06/12/20  1005 06/09/21  1422 07/07/21  1216 07/14/21  1016 09/15/21  1111   Cholesterol 201 H 190  --   --   --   --   --   --  100 L  --    Triglycerides 91 69   < > 164 H 167 H  --   --   --  61  --    HDL 50 56  --   --   --   --   --   --  35 L  --    LDL Cholesterol 132.8 120.2  --   --   --   --   --   --  52.8 L  --    Non-HDL Cholesterol 151 134  --   --   --   --   --   --  65  --    AST  --  16   < > 19 22   < > 14 15  --  18   ALT  --  17   < > <5 L <5 L   < > 12 11  --  19    < > = values in this interval not displayed.       Hemoglobin A1C   Date Value Ref Range Status   09/15/2021 8.1 (H) 4.0 - 5.6 % Final     Comment:     ADA Screening Guidelines:  5.7-6.4%  Consistent with prediabetes  >or=6.5%  Consistent with diabetes    High levels of fetal hemoglobin interfere with the HbA1C  assay. Heterozygous hemoglobin variants (HbS, HgC, etc)do  not significantly interfere with this assay.   However, presence of multiple variants may affect accuracy.     07/14/2021 10.1 (H) 4.0 - 5.6 % Final     Comment:     ADA Screening Guidelines:  5.7-6.4%  Consistent with prediabetes  >or=6.5%  Consistent with diabetes    High levels of fetal hemoglobin interfere with the HbA1C  assay. Heterozygous hemoglobin variants (HbS, HgC, etc)do  not significantly interfere with this assay.   However, presence of multiple variants may affect accuracy.     04/28/2021 >14.0 (H) 4.0 - 5.6 % Final     Comment:     ADA Screening Guidelines:  5.7-6.4%  Consistent with prediabetes  >or=6.5%  Consistent with diabetes    High levels of fetal hemoglobin interfere with the HbA1C  assay. Heterozygous hemoglobin variants (HbS, HgC, etc)do  not significantly interfere with this assay.   However, presence of multiple variants may affect accuracy.         TSH        The ASCVD Risk score (Chinmaynancy CORTES Jr., et al., 2013) failed to calculate for the following reasons:    The valid total cholesterol range is  130 to 320 mg/dL             ASSESSMENT AND PLAN     Patient Active Problem List   Diagnosis    Essential hypertension    Mixed hyperlipidemia    Traumatic amputation of fifth toe of right foot    Type 2 diabetes mellitus with hyperglycemia, with long-term current use of insulin    Actinomyces infection    Hypokalemia    Neck pain    Diabetic ulcer of right midfoot associated with diabetes mellitus due to underlying condition, with fat layer exposed    Severe malnutrition    Hypertensive retinopathy, bilateral    Allergic reaction due to antibacterial drug    Chronic left shoulder pain    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 edema, with long-term current use of insulin    Diabetic ulcer of left foot    Septic arthritis of left foot    Acute on chronic osteomyelitis    Proteus infection       Nonhealing ulcer of left lower extremity.  Further evaluationperipheral vascular ultrasound DID NOT REVEAL ANY SIGNIFICANT STENOSIS.  CONTINUE TO MONITOR.  IF WOUND DOES NOT HEAL, CONSIDER CT ANGIOGRAM/PERIPHERAL ANGIOGRAM.    States wound is healing well.    Dyspnea on exertion.  Patient states he is getting short of breath on walking short distances less than 50 ft.  With some heaviness in the chest.  Further evaluation with the help of a stress test and echo.  Stress test did not show any significant ischemia.  Echo showed normal left ventricle systolic function.    Statins    Initiate aspirin and Plavix    Follow-up in 2 m          Thank you very much for involving me in the care of your patient.  Please do not hesitate to contact me if there are any questions.      Marshall Stahl MD, FACC, [facility]  Interventional Cardiologist, [facility].           This note was dictated with the help of speech recognition software.  There might be un-intended errors and/or substitutions.

## 2022-03-08 NOTE — PROGRESS NOTES
[facility]  [place]  [address]  Nurse Visit    Subjective:       Patient seen in clinic today.  Dressing changed as ordered.      Assessment:          Wound 10/25/21 1500 Diabetic Ulcer Left midline;plantar Foot (Active)   10/25/21 1500    Pre-existing: Yes   Primary Wound Type: Diabetic ulc   Side: Left   Orientation: midline;plantar   Location: Foot   Wound Number:    Ankle-Brachial Index:    Pulses:    Removal Indication and Assessment:    Wound Outcome:    (Retired) Wound Type:    (Retired) Wound Length (cm):    (Retired) Wound Width (cm):    (Retired) Depth (cm):    Wound Description (Comments):    Removal Indications:    Wound WDL ex 03/08/22 0800   Dressing Appearance Intact;Moist drainage 03/08/22 0800   Drainage Amount Large 03/08/22 0800   Drainage Characteristics/Odor Serosanguineous 03/08/22 0800   Appearance Red 03/08/22 0800   Tissue loss description Partial thickness 03/08/22 0800   Black (%), Wound Tissue Color 0 % 03/08/22 0800   Red (%), Wound Tissue Color 100 % 03/08/22 0800   Yellow (%), Wound Tissue Color 0 % 03/08/22 0800   Periwound Area Moist 03/08/22 0800   Wound Edges Defined 03/08/22 0800   Care Cleansed with:;Antimicrobial agent;Soap and water;Sterile normal saline 03/08/22 0800   Dressing Changed 03/08/22 0800   Periwound Care Moisture barrier applied 03/08/22 0800   Compression Two layer compression 03/08/22 0800   Off Loading Football dressing;Off loading shoe 03/08/22 0800   Dressing Change Due 03/11/22 03/08/22 0800           Plan:     No orders of the defined types were placed in this encounter.          Follow up in about 3 days (around 3/11/2022) for wound care.

## 2022-03-11 ENCOUNTER — HOSPITAL ENCOUNTER (OUTPATIENT)
Dept: WOUND CARE | Facility: HOSPITAL | Age: 52
Discharge: HOME | End: 2022-03-11
Attending: PODIATRIST
Payer: MEDICARE

## 2022-03-11 VITALS — DIASTOLIC BLOOD PRESSURE: 78 MMHG | TEMPERATURE: 98 F | SYSTOLIC BLOOD PRESSURE: 182 MMHG | HEART RATE: 82 BPM

## 2022-03-11 DIAGNOSIS — L97.422 DIABETIC ULCER OF LEFT MIDFOOT ASSOCIATED WITH TYPE 2 DIABETES MELLITUS, WITH FAT LAYER EXPOSED: Primary | ICD-10-CM

## 2022-03-11 DIAGNOSIS — E11.621 DIABETIC ULCER OF LEFT MIDFOOT ASSOCIATED WITH TYPE 2 DIABETES MELLITUS, WITH FAT LAYER EXPOSED: Primary | ICD-10-CM

## 2022-03-11 PROCEDURE — 11042 DEBRIDEMENT: ICD-10-PCS | Mod: ,,, | Performed by: PODIATRIST

## 2022-03-11 PROCEDURE — 99499 UNLISTED E&M SERVICE: CPT | Mod: ,,, | Performed by: PODIATRIST

## 2022-03-11 PROCEDURE — 11042 DBRDMT SUBQ TIS 1ST 20SQCM/<: CPT | Performed by: PODIATRIST

## 2022-03-11 PROCEDURE — 27201912 HC WOUND CARE DEBRIDEMENT SUPPLIES: Performed by: PODIATRIST

## 2022-03-11 PROCEDURE — 99499 NO LOS: ICD-10-PCS | Mod: ,,, | Performed by: PODIATRIST

## 2022-03-11 PROCEDURE — 29581 APPL MULTLAYER CMPRN SYS LEG: CPT

## 2022-03-11 PROCEDURE — 11042 DBRDMT SUBQ TIS 1ST 20SQCM/<: CPT | Mod: ,,, | Performed by: PODIATRIST

## 2022-03-11 NOTE — PROGRESS NOTES
[facility]                Progress Note    Subjective:       Patient ID: Indio Ryder Jr. is a 51 y.o. male.    Chief Complaint: No chief complaint on file.    Follow up wound wound care visit. Patient ambulated to exam room without assistance, prescribed CAM boot on LLE. No c/o pain at present. Relates that he has been making some dietary changes. Dressing intact with serosanguineous drainage to cast padding layer of dressing.       Review of Systems   Constitutional: Negative for activity change, appetite change, chills, fatigue and fever.   HENT: Negative for hearing loss.    Eyes: Negative for photophobia and visual disturbance.   Respiratory: Negative for cough, chest tightness, shortness of breath and wheezing.    Cardiovascular: Positive for leg swelling. Negative for chest pain and palpitations.   Gastrointestinal: Negative for constipation, diarrhea, nausea and vomiting.   Endocrine: Negative for cold intolerance and heat intolerance.   Genitourinary: Negative for flank pain.   Musculoskeletal: Positive for gait problem and myalgias. Negative for neck pain and neck stiffness.   Skin: Positive for wound. Negative for color change.   Neurological: Positive for numbness. Negative for weakness, light-headedness and headaches.        + paresthesia    Psychiatric/Behavioral: Negative for sleep disturbance.         Objective:        Physical Exam  Vitals reviewed.   Constitutional:       Appearance: He is well-developed.   Cardiovascular:      Comments: dorsalis pedis and posterior tibial pulses are palpable bilaterally. Capillary refill time is within normal limits. + pedal hair growth         Musculoskeletal:         General: No tenderness. Normal range of motion.      Comments: Normal angle, base, station of gait. All toes without clubbing, cyanosis, or signs of ischemia.  No pain to palpation bilateral lower extremities.  Range of motion, stability, muscle  strength, and muscle tone normal bilateral feet and legs.    Skin:     General: Skin is warm and dry.      Coloration: Skin is not ashen or cyanotic.      Findings: Wound (see below) present. No rash.      Nails: There is no clubbing.   Neurological:      Mental Status: He is alert and oriented to person, place, and time.      Sensory: No sensory deficit.      Comments: Diminished/loss of protective sensation all toes bilateral to 10 gram monofilament.     Psychiatric:         Behavior: Behavior normal.           Vitals:    03/11/22 1112   BP: (!) 182/78   Pulse: 82   Temp: 97.6 °F (36.4 °C)       Assessment:           ICD-10-CM ICD-9-CM   1. Diabetic ulcer of left midfoot associated with type 2 diabetes mellitus, with fat layer exposed  E11.621 250.80    L97.422 707.14            Wound 10/25/21 1500 Diabetic Ulcer Left midline;plantar Foot (Active)   10/25/21 1500    Pre-existing: Yes   Primary Wound Type: Diabetic ulc   Side: Left   Orientation: midline;plantar   Location: Foot   Wound Number:    Ankle-Brachial Index:    Pulses:    Removal Indication and Assessment:    Wound Outcome:    (Retired) Wound Type:    (Retired) Wound Length (cm):    (Retired) Wound Width (cm):    (Retired) Depth (cm):    Wound Description (Comments):    Removal Indications:    Wound Image    03/11/22 1100   Wound WDL ex 03/11/22 1100   Dressing Appearance Intact;Moist drainage;Saturated 03/11/22 1100   Drainage Amount Large 03/11/22 1100   Drainage Characteristics/Odor Serosanguineous;No odor 03/11/22 1100   Appearance Red;Granulating;Moist 03/11/22 1100   Tissue loss description Partial thickness 03/11/22 1100   Red (%), Wound Tissue Color 100 % 03/11/22 1100   Periwound Area Macerated;Moist;Pale white 03/11/22 1100   Wound Edges Defined 03/11/22 1100   Wound Length (cm) 4 cm 03/11/22 1100   Wound Width (cm) 2.1 cm 03/11/22 1100   Wound Depth (cm) 0.4 cm 03/11/22 1100   Wound Volume (cm^3) 3.36 cm^3 03/11/22 1100   Wound Surface Area  (cm^2) 8.4 cm^2 03/11/22 1100   Care Cleansed with:;Antimicrobial agent;Sterile normal saline 03/11/22 1100   Dressing Changed 03/11/22 1100   Periwound Care Moisture barrier applied 03/11/22 1100   Compression Two layer compression 03/11/22 1100   Off Loading Football dressing;Off loading shoe 03/11/22 1100   Dressing Change Due 03/15/22 03/11/22 1100           Plan:              Left Plantar Foot wound measuring smaller in (0.5 cm) width.     Wound care done as per order, RTC for nurse visit on Tuesday 03/14 and MD on Friday 03/18.    Debridement    Date/Time: 3/11/2022 2:04 PM  Performed by: Marivel Peterson DPM  Authorized by: Marivel Peterson DPM     Consent Done?:  Yes (Written)    Wound Details:    Location:  Left foot    Location:  Left Plantar    Type of Debridement:  Excisional       Length (cm):  4       Area (sq cm):  8.4       Width (cm):  2.1       Percent Debrided (%):  100       Depth (cm):  0.4       Total Area Debrided (sq cm):  8.4    Depth of debridement:  Subcutaneous tissue    Tissue debrided:  Epidermis, Dermis and Subcutaneous    Devitalized tissue debrided:  Callus, Fibrin and Biofilm    Instruments:  Curette    Bleeding:  Minimal  Hemostasis Achieved: Yes    Method Used:  Pressure  Patient tolerance:  Patient tolerated the procedure well with no immediate complications        Orders Placed This Encounter   Procedures    Change dressing     Clean with saline. Cavilon/Gentian violet periwound. Custom-cut felt pad to plantar foot with small hole (x2) over wound beds. Apply Iodosorb to wound beds. Cover with Drawtex cut-to-fit x 2, Mextra short x1 (long n/a). Football dressing (cast padding x 3), Coflex Calamine. CAM Boot. Change during nurse visit.        Follow up in about 4 days (around 3/15/2022) for wound care.

## 2022-03-14 ENCOUNTER — TELEPHONE (OUTPATIENT)
Dept: ENDOSCOPY | Facility: HOSPITAL | Age: 52
End: 2022-03-14
Payer: MEDICARE

## 2022-03-14 NOTE — TELEPHONE ENCOUNTER
Good morning,    Pt has a colonoscopy scheduled with Dr. Haque 3/21/22.  Can pt hold his Plavix for 5 days prior to procedure per endoscopy protocol?  Please advise.    Thank you

## 2022-03-15 ENCOUNTER — TELEPHONE (OUTPATIENT)
Dept: CARDIOLOGY | Facility: CLINIC | Age: 52
End: 2022-03-15
Payer: MEDICARE

## 2022-03-15 ENCOUNTER — HOSPITAL ENCOUNTER (OUTPATIENT)
Dept: WOUND CARE | Facility: HOSPITAL | Age: 52
Discharge: HOME | End: 2022-03-15
Attending: FAMILY MEDICINE
Payer: MEDICARE

## 2022-03-15 ENCOUNTER — TELEPHONE (OUTPATIENT)
Dept: ENDOSCOPY | Facility: HOSPITAL | Age: 52
End: 2022-03-15
Payer: MEDICARE

## 2022-03-15 ENCOUNTER — PATIENT MESSAGE (OUTPATIENT)
Dept: ADMINISTRATIVE | Facility: HOSPITAL | Age: 52
End: 2022-03-15
Payer: MEDICARE

## 2022-03-15 VITALS — DIASTOLIC BLOOD PRESSURE: 99 MMHG | HEART RATE: 82 BPM | SYSTOLIC BLOOD PRESSURE: 174 MMHG

## 2022-03-15 PROCEDURE — 29581 APPL MULTLAYER CMPRN SYS LEG: CPT

## 2022-03-15 NOTE — TELEPHONE ENCOUNTER
LOV 3/8/22  Nelida is requesting is it okay for pt to hold plavix for upcoming procedure on 3/21/22

## 2022-03-15 NOTE — TELEPHONE ENCOUNTER
Telephoned Dr. Stahl's office [phone] regarding approval request for pt to hold his Plavix for 5 days prior to upcoming Colonoscopy on 3/21/22.  Awaiting return call.

## 2022-03-15 NOTE — TELEPHONE ENCOUNTER
----- Message from Deborah Knapp sent at 3/15/2022  9:06 AM CDT -----  Type: Patient Call Back    Who called: Sydney from [facility]     What is the request in detail: Patient has upcoming procedure and needs to know if patient can hold his Plavix.     Can the clinic reply by [facility]? No     Would the patient rather a call back or a response via [facility]? Call back     Best call back number: [phone]

## 2022-03-15 NOTE — PROGRESS NOTES
[facility]                Progress Note    Subjective:       Patient ID: Indio Ryder Jr. is a 51 y.o. male.    Chief Complaint: No chief complaint on file.    To clinic for nurse visit wearing cam boot. Drainage through football but not through calamine. States no change in activity. Stacked Drawtex placed with abd pad as no Mextra.      Review of Systems      Objective:        Physical Exam    Vitals:    03/15/22 1118   BP: (!) 174/99   Pulse: 82       Assessment:         No diagnosis found.             Plan:                No orders of the defined types were placed in this encounter.       No follow-ups on file.       
Yes

## 2022-03-15 NOTE — TELEPHONE ENCOUNTER
Telephoned Dr. Stahl's office [phone] regarding approval request for pt to hold his Plavix for 5 days prior to upcoming Colonoscopy on 3/21/22.  Awaiting return call.

## 2022-03-16 ENCOUNTER — PATIENT MESSAGE (OUTPATIENT)
Dept: ADMINISTRATIVE | Facility: HOSPITAL | Age: 52
End: 2022-03-16
Payer: MEDICARE

## 2022-03-16 ENCOUNTER — TELEPHONE (OUTPATIENT)
Dept: ENDOSCOPY | Facility: HOSPITAL | Age: 52
End: 2022-03-16
Payer: MEDICARE

## 2022-03-16 ENCOUNTER — PATIENT MESSAGE (OUTPATIENT)
Dept: ENDOSCOPY | Facility: HOSPITAL | Age: 52
End: 2022-03-16
Payer: MEDICARE

## 2022-03-16 DIAGNOSIS — Z12.11 SCREEN FOR COLON CANCER: Primary | ICD-10-CM

## 2022-03-16 RX ORDER — POLYETHYLENE GLYCOL 3350, SODIUM SULFATE ANHYDROUS, SODIUM BICARBONATE, SODIUM CHLORIDE, POTASSIUM CHLORIDE 236; 22.74; 6.74; 5.86; 2.97 G/4L; G/4L; G/4L; G/4L; G/4L
4 POWDER, FOR SOLUTION ORAL ONCE
Qty: 4000 ML | Refills: 0 | Status: SHIPPED | OUTPATIENT
Start: 2022-03-16 | End: 2022-03-21

## 2022-03-16 NOTE — TELEPHONE ENCOUNTER
Received request to schedule patient for Colonoscopy on 3/21/2022 at 9:30am.  Spoke with patient.  Pt is fully vaccinated.  Expressed to pt that I had reached out to Dr. Stahl with Cardiology for approval to hold his Plavix and pt told me he got it filled, but never started taking it.  Reviewed medical history and medications.  Instructed on procedure and prep.  Patient verbalized understanding of instructions.  Copy of instructions sent via patient portal

## 2022-03-16 NOTE — TELEPHONE ENCOUNTER
Telephoned Dr. Stahl's office [phone] regarding approval request for pt to hold his Plavix for 5 days prior to upcoming Colonoscopy on 3/21/22.  Awaiting return call.

## 2022-03-17 ENCOUNTER — TELEPHONE (OUTPATIENT)
Dept: CARDIOLOGY | Facility: CLINIC | Age: 52
End: 2022-03-17
Payer: MEDICARE

## 2022-03-17 NOTE — TELEPHONE ENCOUNTER
----- Message from Amanda Doe RN sent at 3/15/2022  2:58 PM CDT -----  Regarding: FW: luanne with [facility]  Luanne is calling back to speak with you (see message below)    ----- Message -----  From: Mignon Ruelas  Sent: 3/15/2022  12:34 PM CDT  To: Maribeth Olivares Staff  Subject: luanne with [facility]                             .Type:  Patient Returning Call    Who Called: luanne     Who Left Message for Patient: Megan    Does the patient know what this is regarding?: yes previous message     Would the patient rather a call back or a response via My [facility]? Call     Best Call Back Number: [phone]

## 2022-03-17 NOTE — TELEPHONE ENCOUNTER
----- Message from Amanda Doe RN sent at 3/15/2022  2:58 PM CDT -----  Regarding: FW: luanne with [facility]  Luanne is calling back to speak with you (see message below)    ----- Message -----  From: Mignon Ruelas  Sent: 3/15/2022  12:34 PM CDT  To: Maribeth Olivares Staff  Subject: luanne with [facility]                             .Type:  Patient Returning Call    Who Called: luanne     Who Left Message for Patient: Megan    Does the patient know what this is regarding?: yes previous message     Would the patient rather a call back or a response via [facility]? Call     Best Call Back Number: [phone]

## 2022-03-17 NOTE — TELEPHONE ENCOUNTER
Pt informed Luanne that he was prescribed plavix in January but he never started medication, please advise

## 2022-03-18 ENCOUNTER — HOSPITAL ENCOUNTER (OUTPATIENT)
Dept: WOUND CARE | Facility: HOSPITAL | Age: 52
Discharge: HOME | End: 2022-03-18
Attending: PODIATRIST
Payer: MEDICARE

## 2022-03-18 VITALS
WEIGHT: 228 LBS | DIASTOLIC BLOOD PRESSURE: 78 MMHG | BODY MASS INDEX: 30.22 KG/M2 | HEIGHT: 73 IN | TEMPERATURE: 98 F | SYSTOLIC BLOOD PRESSURE: 147 MMHG

## 2022-03-18 DIAGNOSIS — E11.621 DIABETIC ULCER OF LEFT MIDFOOT ASSOCIATED WITH TYPE 2 DIABETES MELLITUS, WITH FAT LAYER EXPOSED: Primary | ICD-10-CM

## 2022-03-18 DIAGNOSIS — L97.422 DIABETIC ULCER OF LEFT MIDFOOT ASSOCIATED WITH TYPE 2 DIABETES MELLITUS, WITH FAT LAYER EXPOSED: Primary | ICD-10-CM

## 2022-03-18 PROCEDURE — 99499 NO LOS: ICD-10-PCS | Mod: ,,, | Performed by: PODIATRIST

## 2022-03-18 PROCEDURE — 11042 DBRDMT SUBQ TIS 1ST 20SQCM/<: CPT | Mod: ,,, | Performed by: PODIATRIST

## 2022-03-18 PROCEDURE — 11042 DBRDMT SUBQ TIS 1ST 20SQCM/<: CPT | Performed by: PODIATRIST

## 2022-03-18 PROCEDURE — 99499 UNLISTED E&M SERVICE: CPT | Mod: ,,, | Performed by: PODIATRIST

## 2022-03-18 PROCEDURE — 27201912 HC WOUND CARE DEBRIDEMENT SUPPLIES: Performed by: PODIATRIST

## 2022-03-18 PROCEDURE — 11042 WOUND DEBRIDEMENT: ICD-10-PCS | Mod: ,,, | Performed by: PODIATRIST

## 2022-03-18 NOTE — PROGRESS NOTES
[facility]                Progress Note    Subjective:       Patient ID: Indio Ryder Jr. is a 51 y.o. male.    Chief Complaint: No chief complaint on file.    F/u wound care visit. Patient ambulatory to exam room with cam boot in use, no difficulty noted. Patient denies pain at present. Wound dressing to LLE intact with large amount of drainage with breakthrough drainage noted through mextra.      Review of Systems   Constitutional: Negative for activity change, appetite change, chills, fatigue and fever.   HENT: Negative for hearing loss.    Eyes: Negative for photophobia and visual disturbance.   Respiratory: Negative for cough, chest tightness, shortness of breath and wheezing.    Cardiovascular: Positive for leg swelling. Negative for chest pain and palpitations.   Gastrointestinal: Negative for constipation, diarrhea, nausea and vomiting.   Endocrine: Negative for cold intolerance and heat intolerance.   Genitourinary: Negative for flank pain.   Musculoskeletal: Positive for gait problem and myalgias. Negative for neck pain and neck stiffness.   Skin: Positive for wound. Negative for color change.   Neurological: Positive for numbness. Negative for weakness, light-headedness and headaches.        + paresthesia    Psychiatric/Behavioral: Negative for sleep disturbance.         Objective:        Physical Exam  Vitals reviewed.   Constitutional:       Appearance: He is well-developed.   Cardiovascular:      Comments: dorsalis pedis and posterior tibial pulses are palpable bilaterally. Capillary refill time is within normal limits. + pedal hair growth         Musculoskeletal:         General: No tenderness. Normal range of motion.      Comments: Normal angle, base, station of gait. All toes without clubbing, cyanosis, or signs of ischemia.  No pain to palpation bilateral lower extremities.  Range of motion, stability, muscle strength, and muscle tone normal  bilateral feet and legs.    Skin:     General: Skin is warm and dry.      Coloration: Skin is not ashen or cyanotic.      Findings: Wound (see below) present. No rash.      Nails: There is no clubbing.   Neurological:      Mental Status: He is alert and oriented to person, place, and time.      Sensory: No sensory deficit.      Comments: Diminished/loss of protective sensation all toes bilateral to 10 gram monofilament.     Psychiatric:         Behavior: Behavior normal.           Vitals:    03/18/22 1101   BP: (!) 147/78   Temp: 97.7 °F (36.5 °C)       Assessment:           ICD-10-CM ICD-9-CM   1. Diabetic ulcer of left midfoot associated with type 2 diabetes mellitus, with fat layer exposed  E11.621 250.80    L97.422 707.14            Wound 10/25/21 1500 Diabetic Ulcer Left midline;plantar Foot (Active)   10/25/21 1500    Pre-existing: Yes   Primary Wound Type: Diabetic ulc   Side: Left   Orientation: midline;plantar   Location: Foot   Wound Number:    Ankle-Brachial Index:    Pulses:    Removal Indication and Assessment:    Wound Outcome:    (Retired) Wound Type:    (Retired) Wound Length (cm):    (Retired) Wound Width (cm):    (Retired) Depth (cm):    Wound Description (Comments):    Removal Indications:    Wound Image   03/18/22 1100   Wound WDL ex 03/18/22 1100   Dressing Appearance Moist drainage 03/18/22 1100   Drainage Amount Large 03/18/22 1100   Drainage Characteristics/Odor Green 03/18/22 1100   Appearance Red 03/18/22 1100   Tissue loss description Partial thickness 03/18/22 1100   Black (%), Wound Tissue Color 0 % 03/18/22 1100   Red (%), Wound Tissue Color 100 % 03/18/22 1100   Yellow (%), Wound Tissue Color 0 % 03/18/22 1100   Periwound Area Macerated 03/18/22 1100   Wound Edges Defined 03/18/22 1100   Wound Length (cm) 4.5 cm 03/18/22 1100   Wound Width (cm) 2.1 cm 03/18/22 1100   Wound Depth (cm) 0.4 cm 03/18/22 1100   Wound Volume (cm^3) 3.78 cm^3 03/18/22 1100   Wound Surface Area (cm^2) 9.45  cm^2 03/18/22 1100   Care Cleansed with:;Antimicrobial agent;Sterile normal saline 03/18/22 1100   Dressing Changed 03/18/22 1100   Periwound Care Moisture barrier applied 03/18/22 1100   Compression Two layer compression 03/18/22 1100   Off Loading Football dressing;Contact cast 03/18/22 1100   Dressing Change Due 03/22/22 03/18/22 1100           Plan:            Wound Debridement    Date/Time: 3/18/2022 10:55 AM  Performed by: Marivel Peterson DPM  Authorized by: Marivel Peterson DPM     Consent Done?:  Yes (Written)    Preparation: Patient was prepped and draped in usual sterile fashion      Wound Details:    Location:  Left foot    Location:  Left Midfoot    Type of Debridement:  Excisional       Length (cm):  4.5       Area (sq cm):  9.45       Width (cm):  2.1       Percent Debrided (%):  100       Depth (cm):  0.4       Total Area Debrided (sq cm):  9.45    Depth of debridement:  Subcutaneous tissue    Tissue debrided:  Dermis, Epidermis and Subcutaneous    Devitalized tissue debrided:  Callus, Fibrin and Slough    Instruments:  Curette    Bleeding:  Minimal  Hemostasis Achieved: Yes    Method Used:  Pressure  Patient tolerance:  Patient tolerated the procedure well with no immediate complications      Wound to left plantar foot measuring 0.5cm larger width.    Wound history of regression and stagnation makes me client believes that patient would benefit at this time from skin substitute to in order to expedite the formation of granulation tissue and ultimately speed up the healing process and prevent infection or amputation.    Wound care done per order. Patient unable to have nurse visit. RTC in one week for MD visit.      Orders Placed This Encounter   Procedures    Change dressing     Clean with saline. Cavilon/Gentian violet periwound. Custom-cut felt pad to plantar foot with small hole (x2) over wound beds. Apply Iodosorb to wound beds. Cover with Drawtex cut-to-fit x 2, Mextra short x2(long n/a). Football  dressing (cast padding x 3), Coflex Calamine. CAM Boot.        Follow up in about 1 week (around 3/25/2022) for wound care.

## 2022-03-21 ENCOUNTER — ANESTHESIA (OUTPATIENT)
Dept: ENDOSCOPY | Facility: HOSPITAL | Age: 52
End: 2022-03-21
Payer: MEDICARE

## 2022-03-21 ENCOUNTER — ANESTHESIA EVENT (OUTPATIENT)
Dept: ENDOSCOPY | Facility: HOSPITAL | Age: 52
End: 2022-03-21
Payer: MEDICARE

## 2022-03-21 ENCOUNTER — HOSPITAL ENCOUNTER (OUTPATIENT)
Facility: HOSPITAL | Age: 52
Discharge: HOME | End: 2022-03-21
Attending: INTERNAL MEDICINE | Admitting: INTERNAL MEDICINE
Payer: MEDICARE

## 2022-03-21 VITALS
BODY MASS INDEX: 29.82 KG/M2 | WEIGHT: 225 LBS | TEMPERATURE: 97 F | HEIGHT: 73 IN | OXYGEN SATURATION: 100 % | DIASTOLIC BLOOD PRESSURE: 95 MMHG | SYSTOLIC BLOOD PRESSURE: 147 MMHG | RESPIRATION RATE: 20 BRPM | HEART RATE: 76 BPM

## 2022-03-21 DIAGNOSIS — K63.5 COLON POLYP: ICD-10-CM

## 2022-03-21 DIAGNOSIS — D12.6 ADENOMATOUS POLYP OF COLON, UNSPECIFIED PART OF COLON: Primary | ICD-10-CM

## 2022-03-21 LAB
POCT GLUCOSE: 230 MG/DL (ref 70–110)
POCT GLUCOSE: 264 MG/DL (ref 70–110)

## 2022-03-21 PROCEDURE — 27202363 HC INJECTION AGENT, SUBMUCOSAL, ANY: Performed by: INTERNAL MEDICINE

## 2022-03-21 PROCEDURE — D9220A PRA ANESTHESIA: Mod: ANES,,, | Performed by: ANESTHESIOLOGY

## 2022-03-21 PROCEDURE — 88305 TISSUE EXAM BY PATHOLOGIST: CPT | Performed by: PATHOLOGY

## 2022-03-21 PROCEDURE — 63600175 PHARM REV CODE 636 W HCPCS: Performed by: NURSE ANESTHETIST, CERTIFIED REGISTERED

## 2022-03-21 PROCEDURE — 27202298: Performed by: INTERNAL MEDICINE

## 2022-03-21 PROCEDURE — 27201028 HC NEEDLE, SCLERO: Performed by: INTERNAL MEDICINE

## 2022-03-21 PROCEDURE — 45390: ICD-10-PCS | Mod: ,,, | Performed by: INTERNAL MEDICINE

## 2022-03-21 PROCEDURE — 45390 COLONOSCOPY W/RESECTION: CPT | Mod: ,,, | Performed by: INTERNAL MEDICINE

## 2022-03-21 PROCEDURE — 37000009 HC ANESTHESIA EA ADD 15 MINS: Performed by: INTERNAL MEDICINE

## 2022-03-21 PROCEDURE — D9220A PRA ANESTHESIA: ICD-10-PCS | Mod: ANES,,, | Performed by: ANESTHESIOLOGY

## 2022-03-21 PROCEDURE — 37000008 HC ANESTHESIA 1ST 15 MINUTES: Performed by: INTERNAL MEDICINE

## 2022-03-21 PROCEDURE — 25000003 PHARM REV CODE 250: Performed by: INTERNAL MEDICINE

## 2022-03-21 PROCEDURE — 27201089 HC SNARE, DISP (ANY): Performed by: INTERNAL MEDICINE

## 2022-03-21 PROCEDURE — D9220A PRA ANESTHESIA: ICD-10-PCS | Mod: CRNA,,, | Performed by: NURSE ANESTHETIST, CERTIFIED REGISTERED

## 2022-03-21 PROCEDURE — 88305 TISSUE EXAM BY PATHOLOGIST: CPT | Mod: 26,,, | Performed by: PATHOLOGY

## 2022-03-21 PROCEDURE — 25000003 PHARM REV CODE 250: Performed by: NURSE ANESTHETIST, CERTIFIED REGISTERED

## 2022-03-21 PROCEDURE — 88305 TISSUE EXAM BY PATHOLOGIST: ICD-10-PCS | Mod: 26,,, | Performed by: PATHOLOGY

## 2022-03-21 PROCEDURE — 82962 GLUCOSE BLOOD TEST: CPT | Performed by: INTERNAL MEDICINE

## 2022-03-21 PROCEDURE — D9220A PRA ANESTHESIA: Mod: CRNA,,, | Performed by: NURSE ANESTHETIST, CERTIFIED REGISTERED

## 2022-03-21 PROCEDURE — 45390 COLONOSCOPY W/RESECTION: CPT | Performed by: INTERNAL MEDICINE

## 2022-03-21 RX ORDER — SODIUM CHLORIDE 0.9 % (FLUSH) 0.9 %
10 SYRINGE (ML) INJECTION
Status: DISCONTINUED | OUTPATIENT
Start: 2022-03-21 | End: 2022-03-21 | Stop reason: HOSPADM

## 2022-03-21 RX ORDER — LIDOCAINE HYDROCHLORIDE 20 MG/ML
INJECTION INTRAVENOUS
Status: DISCONTINUED | OUTPATIENT
Start: 2022-03-21 | End: 2022-03-21

## 2022-03-21 RX ORDER — PROPOFOL 10 MG/ML
VIAL (ML) INTRAVENOUS CONTINUOUS PRN
Status: DISCONTINUED | OUTPATIENT
Start: 2022-03-21 | End: 2022-03-21

## 2022-03-21 RX ORDER — PHENYLEPHRINE HYDROCHLORIDE 10 MG/ML
INJECTION INTRAVENOUS
Status: DISCONTINUED | OUTPATIENT
Start: 2022-03-21 | End: 2022-03-21

## 2022-03-21 RX ORDER — SODIUM CHLORIDE 9 MG/ML
INJECTION, SOLUTION INTRAVENOUS CONTINUOUS
Status: DISCONTINUED | OUTPATIENT
Start: 2022-03-21 | End: 2022-03-21 | Stop reason: HOSPADM

## 2022-03-21 RX ORDER — PROPOFOL 10 MG/ML
VIAL (ML) INTRAVENOUS
Status: DISCONTINUED | OUTPATIENT
Start: 2022-03-21 | End: 2022-03-21

## 2022-03-21 RX ADMIN — PROPOFOL 100 MG: 10 INJECTION, EMULSION INTRAVENOUS at 10:03

## 2022-03-21 RX ADMIN — PHENYLEPHRINE HYDROCHLORIDE 150 MCG: 10 INJECTION INTRAVENOUS at 11:03

## 2022-03-21 RX ADMIN — PROPOFOL 50 MG: 10 INJECTION, EMULSION INTRAVENOUS at 10:03

## 2022-03-21 RX ADMIN — PHENYLEPHRINE HYDROCHLORIDE 100 MCG: 10 INJECTION INTRAVENOUS at 11:03

## 2022-03-21 RX ADMIN — SODIUM CHLORIDE: 0.9 INJECTION, SOLUTION INTRAVENOUS at 10:03

## 2022-03-21 RX ADMIN — Medication 200 MCG/KG/MIN: at 10:03

## 2022-03-21 RX ADMIN — LIDOCAINE HYDROCHLORIDE 100 MG: 20 INJECTION, SOLUTION INTRAVENOUS at 10:03

## 2022-03-21 NOTE — [employer] PATIENT INSTRUCTIONS
Discharge Summary/Instructions after an Endoscopic Procedure  Patient Name: Indio Ryder  Patient MRN: [id number]  Patient YOB: 1970 Monday, March 21, 2022  Sheng Haque MD  Dear patient,  As a result of recent federal legislation (The Federal Cures Act), you may   receive lab or pathology results from your procedure in your [facility]   account before your physician is able to contact you. Your physician or   their representative will relay the results to you with their   recommendations at their soonest availability.  Thank you,  RESTRICTIONS:  During your procedure today, you received medications for sedation.  These   medications may affect your judgment, balance and coordination.  Therefore,   for 24 hours, you have the following restrictions:   - DO NOT drive a car, operate machinery, make legal/financial decisions,   sign important papers or drink alcohol.    ACTIVITY:  Today: no heavy lifting, straining or running due to procedural   sedation/anesthesia.  The following day: return to full activity including work.  DIET:  Eat and drink normally unless instructed otherwise.     TREATMENT FOR COMMON SIDE EFFECTS:  - Mild abdominal pain, nausea, belching, bloating or excessive gas:  rest,   eat lightly and use a heating pad.  - Sore Throat: treat with throat lozenges and/or gargle with warm salt   water.  - Because air was used during the procedure, expelling large amounts of air   from your rectum or belching is normal.  - If a bowel prep was taken, you may not have a bowel movement for 1-3 days.    This is normal.  SYMPTOMS TO WATCH FOR AND REPORT TO YOUR PHYSICIAN:  1. Abdominal pain or bloating, other than gas cramps.  2. Chest pain.  3. Back pain.  4. Signs of infection such as: chills or fever occurring within 24 hours   after the procedure.  5. Rectal bleeding, which would show as bright red, maroon, or black stools.   (A tablespoon of blood from the rectum is not serious, especially if    hemorrhoids are present.)  6. Vomiting.  7. Weakness or dizziness.  GO DIRECTLY TO THE NEAREST EMERGENCY ROOM IF YOU HAVE ANY OF THE FOLLOWING:      Difficulty breathing              Chills and/or fever over 101 F   Persistent vomiting and/or vomiting blood   Severe abdominal pain   Severe chest pain   Black, tarry stools   Bleeding- more than one tablespoon   Any other symptom or condition that you feel may need urgent attention  Your doctor recommends these additional instructions:  If any biopsies were taken, your doctors clinic will contact you in 1 to 2   weeks with any results.  - Discharge patient to home (ambulatory).   - No aspirin, ibuprofen, naproxen, or other non-steroidal anti-inflammatory   drugs for 2 weeks after polyp removal.   - Await pathology results.   - Repeat colonoscopy in 6 months for surveillance after piecemeal   polypectomy.   - Resume previous diet.   - Patient has a contact number available for emergencies.  The signs and   symptoms of potential delayed complications were discussed with the   patient.  Return to normal activities tomorrow.  Written discharge   instructions were provided to the patient.  For questions, problems or results please call your physician - Sheng Haque MD at Work:  [phone].  [facility] [place], EMERGENCY ROOM PHONE NUMBER: [phone]  IF A COMPLICATION OR EMERGENCY SITUATION ARISES AND YOU ARE UNABLE TO REACH   YOUR PHYSICIAN - GO DIRECTLY TO THE EMERGENCY ROOM.  Sheng Haque MD  3/21/2022 11:37:22 AM  This report has been verified and signed electronically.  Dear patient,  As a result of recent federal legislation (The Federal Cures Act), you may   receive lab or pathology results from your procedure in your [facility]   account before your physician is able to contact you. Your physician or   their representative will relay the results to you with their   recommendations at their soonest availability.  Thank you,  PROVATION

## 2022-03-21 NOTE — ANESTHESIA PREPROCEDURE EVALUATION
03/21/2022  Pre-operative evaluation for Procedure(s) (LRB):  COLONOSCOPY (N/A)    Indio Ryder Jr. is a 51 y.o. male hx of htn, dm, peripheral vascular disease,       Normal myocardial perfusion scan. There is no evidence of myocardial ischemia or infarction.    There is a  mild intensity fixed perfusion abnormality in the inferior wall of the left ventricle secondary to diaphragm attenuation.    The gated perfusion images showed an ejection fraction of 50% at rest.    The EKG portion of this study is negative for ischemia.    The patient reported no chest pain during the stress test.    There were no arrhythmias during stress.    · Eccentric hypertrophy and low normal systolic function.  · Mild to moderate left atrial enlargement.  · Mild tricuspid regurgitation.  · The estimated ejection fraction is 50%.  · Grade I left ventricular diastolic dysfunction.  · Normal right ventricular size with normal right ventricular systolic function.  · Mild right atrial enlargement.  · Normal central venous pressure (3 mmHg).  · The estimated PA systolic pressure is 21 mmHg.          Patient Active Problem List   Diagnosis    Essential hypertension    Mixed hyperlipidemia    Traumatic amputation of fifth toe of right foot    Type 2 diabetes mellitus with hyperglycemia, with long-term current use of insulin    Actinomyces infection    Hypokalemia    Neck pain    Diabetic ulcer of right midfoot associated with diabetes mellitus due to underlying condition, with fat layer exposed    Severe malnutrition    Hypertensive retinopathy, bilateral    Allergic reaction due to antibacterial drug    Chronic left shoulder pain    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 edema, with long-term current use of insulin    Diabetic ulcer of left  "foot    Septic arthritis of left foot    Acute on chronic osteomyelitis    Proteus infection       Review of patient's allergies indicates:  No Known Allergies    No current facility-administered medications on file prior to encounter.     Current Outpatient Medications on File Prior to Encounter   Medication Sig Dispense Refill    acetaminophen (TYLENOL) 325 MG tablet Take 2 tablets (650 mg total) by mouth every 6 (six) hours as needed. 30 tablet 0    atorvastatin (LIPITOR) 80 MG tablet Take 1 tablet (80 mg total) by mouth once daily. (Patient not taking: No sig reported) 90 tablet 0    dulaglutide (TRULICITY) 1.5 mg/0.5 mL pen injector Inject 1.5 mg into the skin every 7 days. 4 pen 5    insulin aspart U-100 (NOVOLOG) 100 unit/mL (3 mL) InPn pen Inject 8 units before meals plus scale 150-200+2, 201-250+4, 251-300+6, 301-350+8, >350+10. Max daily 54 units. 15 mL 6    metFORMIN (GLUCOPHAGE) 500 MG tablet Take 2 tablets by mouth in the morning, and take 2 tablets by mouth at night. (Patient taking differently: Take 2 tablets by mouth in the morning, and take 2 tablets by mouth at night.) 360 tablet 3    ONETOUCH DELICA LANCETS 33 gauge Misc       ONETOUCH ULTRA2 kit       pen needle, diabetic (BD SASCHA 2ND GEN PEN NEEDLE) 32 gauge x 5/32" Ndle Use 4 times a day (Patient taking differently: Use 4 times a day) 400 each 3    permethrin (ELIMITE) 5 % cream Apply neck down at night for 1 application. Wash off in morning and Repeat in 1 week (Patient not taking: No sig reported) 60 g 1       Past Surgical History:   Procedure Laterality Date    COLONOSCOPY Right 1/19/2022    Procedure: COLONOSCOPY;  Surgeon: Tj Haile MD;  Location: [facility] ([address] [place] [address]);  Service: Endoscopy;  Laterality: Right;  previous order un-usable/poor prep 1/18/22  RAPID COVID test arrival 12:20  instructions handed to pt- sm    DEBRIDEMENT OF FOOT Left 7/14/2019    Procedure: DEBRIDEMENT, FOOT, with left 5th ray amputation;  Surgeon: " Sis Hickman DPM;  [facility];  Service: Podiatry;  Laterality: Left;    DEBRIDEMENT OF FOOT Left 2019    Procedure: DEBRIDEMENT, FOOT with leftt 5th ray partial amputation with Neox Graft;  Surgeon: Mai Burrell DPM;  Location: [facility] OR 2ND FLR;  Service: Podiatry;  Laterality: Left;  t    DEBRIDEMENT OF FOOT Bilateral 2021    Procedure: DEBRIDEMENT, FOOT;  Surgeon: Mai Burrell DPM;  Location: [facility] OR 1ST FLR;  Service: Podiatry;  Laterality: Bilateral;  Graft application    TOE AMPUTATION Right 2015    TOE AMPUTATION Right 2017       Social History     Socioeconomic History    Marital status: Single    Number of children: 0   Occupational History    Occupation: Retired     Employer: [employer]   Tobacco Use    Smoking status: Never Smoker    Smokeless tobacco: Never Used   Substance and Sexual Activity    Alcohol use: No    Drug use: No    Sexual activity: Yes     Partners: Female     Birth control/protection: Condom         CBC: No results for input(s): WBC, RBC, HGB, HCT, PLT, MCV, MCH, MCHC in the last 72 hours.    CMP: No results for input(s): NA, K, CL, CO2, BUN, CREATININE, GLU, MG, PHOS, CALCIUM, ALBUMIN, PROT, ALKPHOS, ALT, AST, BILITOT in the last 72 hours.    INR  No results for input(s): PT, INR, PROTIME, APTT in the last 72 hours.        Diagnostic Studies:      EKD Echo:  No results found. However, due to the size of the patient record, not all encounters were searched. Please check Results Review for a complete set of results.        Pre-op Assessment    I have reviewed the Patient Summary Reports.     I have reviewed the Nursing Notes. I have reviewed the NPO Status.   I have reviewed the Medications.     Review of Systems  Anesthesia Hx:  No problems with previous Anesthesia  History of prior surgery of interest to airway management or planning: Denies Family Hx of Anesthesia complications.   Denies Personal Hx  of Anesthesia complications.   Hematology/Oncology:         -- Denies Anemia:   Cardiovascular:   Exercise tolerance: good Hypertension Denies CAD.    Denies CABG/stent.  Denies Dysrhythmias.  ECG has been reviewed.    Pulmonary:   Denies COPD.  Denies Asthma.  Denies Sleep Apnea.    Renal/:   Chronic Renal Disease    Hepatic/GI:   PUD, Denies GERD.    Neurological:   Denies CVA. Denies Seizures.    Endocrine:   Diabetes        Physical Exam  General: Well nourished    Airway:  Mallampati: II / II  Mouth Opening: Normal  TM Distance: Normal  Tongue: Normal  Neck ROM: Normal ROM    Dental:  Intact    Chest/Lungs:  Clear to auscultation, Normal Respiratory Rate    Heart:  Rate: Normal  Rhythm: Regular Rhythm  Sounds: Normal        Anesthesia Plan  Type of Anesthesia, risks & benefits discussed:    Anesthesia Type: Gen Natural Airway  Intra-op Monitoring Plan: Standard ASA Monitors  Post Op Pain Control Plan: multimodal analgesia  Induction:  IV  Informed Consent: Informed consent signed with the Patient and all parties understand the risks and agree with anesthesia plan.  All questions answered.   ASA Score: 3  Day of Surgery Review of History & Physical: H&P Update referred to the surgeon/provider.    Ready For Surgery From Anesthesia Perspective.     .

## 2022-03-21 NOTE — PROGRESS NOTES
Dr. Haque to bedside to discuss procedure and findings. Pt voiced understanding.  Dr. Rose aware of glucose = 264, asymptomatic. States pt to resume meds when gets home for diabetes.

## 2022-03-21 NOTE — BRIEF OP NOTE
Discharge Summary/Instructions after an Endoscopic Procedure    Patient Name: Indio Ryder  Patient MRN: [id number]  Patient YOB: 1970 Monday, March 21, 2022  Sheng Haque MD    Dear patient,  As a result of recent federal legislation (The Federal Cures Act), you may receive lab or pathology results from your procedure in your [facility] account before your physician is able to contact you. Your physician or their representative will relay the results to you with their recommendations at their soonest availability.  Thank you,    RESTRICTIONS:  During your procedure today, you received medications for sedation.  These medications may affect your judgment, balance and coordination.  Therefore, for 24 hours, you have the following restrictions:     - DO NOT drive a car, operate machinery, make legal/financial decisions, sign important papers or drink alcohol.      ACTIVITY:  Today: no heavy lifting, straining or running due to procedural sedation/anesthesia.  The following day: return to full activity including work.    DIET:  Eat and drink normally unless instructed otherwise.     TREATMENT FOR COMMON SIDE EFFECTS:  - Mild abdominal pain, nausea, belching, bloating or excessive gas:  rest, eat lightly and use a heating pad.  - Sore Throat: treat with throat lozenges and/or gargle with warm salt water.  - Because air was used during the procedure, expelling large amounts of air from your rectum or belching is normal.  - If a bowel prep was taken, you may not have a bowel movement for 1-3 days.  This is normal.      SYMPTOMS TO WATCH FOR AND REPORT TO YOUR PHYSICIAN:  1. Abdominal pain or bloating, other than gas cramps.  2. Chest pain.  3. Back pain.  4. Signs of infection such as: chills or fever occurring within 24 hours after the procedure.  5. Rectal bleeding, which would show as bright red, maroon, or black stools. (A tablespoon of blood from the rectum is not serious, especially if hemorrhoids  are present.)  6. Vomiting.  7. Weakness or dizziness.      GO DIRECTLY TO THE NEAREST EMERGENCY ROOM IF YOU HAVE ANY OF THE FOLLOWING:     Difficulty breathing              Chills and/or fever over 101 F   Persistent vomiting and/or vomiting blood   Severe abdominal pain   Severe chest pain   Black, tarry stools   Bleeding- more than one tablespoon   Any other symptom or condition that you feel may need urgent attention    Your doctor recommends these additional instructions:  If any biopsies were taken, your doctors clinic will contact you in 1 to 2 weeks with any results.    - Discharge patient to home (ambulatory).   - No aspirin, ibuprofen, naproxen, or other non-steroidal anti-inflammatory drugs for 2 weeks after polyp removal.   - Await pathology results.   - Repeat colonoscopy in 6 months for surveillance after piecemeal polypectomy.   - Resume previous diet.   - Patient has a contact number available for emergencies.  The signs and symptoms of potential delayed complications were discussed with the patient.  Return to normal activities tomorrow.  Written discharge instructions were provided to the patient.    For questions, problems or results please call your physician - Sheng Haque MD at Work:  [phone].    [facility], EMERGENCY ROOM PHONE NUMBER: [phone]    IF A COMPLICATION OR EMERGENCY SITUATION ARISES AND YOU ARE UNABLE TO REACH YOUR PHYSICIAN - GO DIRECTLY TO THE EMERGENCY ROOM.

## 2022-03-21 NOTE — H&P
"History & Physical - Short Stay  Gastroenterology      SUBJECTIVE:     Procedure: Colonoscopy    Chief Complaint/Indication for Procedure: Colon polyps    History of Present Illness:  Patient is a 51 y.o. male presents with hepatic flexure polyp here for EMR.     Facility-Administered Medications Prior to Admission   Medication    heparin, porcine (PF) 100 unit/mL injection flush 10 Units     PTA Medications   Medication Sig    acetaminophen (TYLENOL) 325 MG tablet Take 2 tablets (650 mg total) by mouth every 6 (six) hours as needed.    aspirin 81 MG Chew Chew and swallow 1 tablet (81 mg total) by mouth once daily.    atorvastatin (LIPITOR) 80 MG tablet Take 1 tablet (80 mg total) by mouth once daily. (Patient not taking: No sig reported)    ciprofloxacin HCl (CIPRO) 250 MG tablet Take 1 tablet (250 mg total) by mouth every 12 (twelve) hours. for 20 days    clopidogreL (PLAVIX) 75 mg tablet Take 1 tablet (75 mg total) by mouth once daily. (Patient not taking: Reported on 3/16/2022)    dulaglutide (TRULICITY) 1.5 mg/0.5 mL pen injector Inject 1.5 mg into the skin every 7 days.    flu vacc [id number] 6mos up,PF, 60 mcg (15 mcg x 4)/0.5 mL Syrg inject IM by [facility]    insulin aspart U-100 (NOVOLOG) 100 unit/mL (3 mL) InPn pen Inject 8 units before meals plus scale 150-200+2, 201-250+4, 251-300+6, 301-350+8, >350+10. Max daily 54 units.    insulin detemir U-100 (LEVEMIR FLEXTOUCH) 100 unit/mL (3 mL) SubQ InPn pen Inject 24 Units into the skin every evening.    lisinopriL (PRINIVIL,ZESTRIL) 20 MG tablet Take 1 tablet (20 mg total) by mouth once daily.    metFORMIN (GLUCOPHAGE) 500 MG tablet Take 2 tablets by mouth in the morning, and take 2 tablets by mouth at night. (Patient taking differently: Take 2 tablets by mouth in the morning, and take 2 tablets by mouth at night.)    ONETOUCH DELICA LANCETS 33 gauge Misc     ONETOUCH ULTRA2 kit     pen needle, diabetic (BD SASCHA 2ND GEN PEN NEEDLE) 32 gauge x 5/32" Ndle " Use 4 times a day (Patient taking differently: Use 4 times a day)    permethrin (ELIMITE) 5 % cream Apply neck down at night for 1 application. Wash off in morning and Repeat in 1 week (Patient not taking: No sig reported)    polyethylene glycol (GOLYTELY,NULYTELY) 236-22.74-6.74 -5.86 gram suspension Take 4,000 mLs (4 L total) by mouth once. for 1 dose       Review of patient's allergies indicates:  No Known Allergies     Past Medical History:   Diagnosis Date    Actinomyces infection 1/17/2017    Right foot    Diabetic ketoacidosis without coma associated with type 2 diabetes mellitus 5/30/2017    Diabetic ulcer of right foot associated with type 2 diabetes mellitus 6/3/2015    Disorder of kidney and ureter     Essential hypertension 6/6/2013    Group B streptococcal infection 1/13/2017    Mixed hyperlipidemia 8/12/2014    Septic arthritis of left foot 4/28/2021    Shoulder impingement 8/12/2014    Subacute osteomyelitis of right foot 1/12/2017    Streptococcus agalactiae, Actinomyces odontolyticus    Traumatic amputation of fifth toe of right foot 07/02/2015    Type 2 diabetes mellitus with diabetic neuropathy, with long-term current use of insulin 5/3/2016    Ulcerative colitis, unspecified      Past Surgical History:   Procedure Laterality Date    COLONOSCOPY Right 1/19/2022    Procedure: COLONOSCOPY;  Surgeon: Tj Haile MD;  [facility] ([facility]);  Service: Endoscopy;  Laterality: Right;  previous order un-usable/poor prep 1/18/22  RAPID COVID test arrival 12:20  instructions handed to pt- sm    DEBRIDEMENT OF FOOT Left 7/14/2019    Procedure: DEBRIDEMENT, FOOT, with left 5th ray amputation;  Surgeon: Sis Hickman DPM;  Location: [facility] OR [address] [place] [address];  Service: Podiatry;  Laterality: Left;    DEBRIDEMENT OF FOOT Left 7/17/2019    Procedure: DEBRIDEMENT, FOOT with leftt 5th ray partial amputation with Neox Graft;  Surgeon: Mai Burrell DPM;  Location: [facility] OR [address] [place] [address];   Service: Podiatry;  Laterality: Left;  t    DEBRIDEMENT OF FOOT Bilateral 4/29/2021    Procedure: DEBRIDEMENT, FOOT;  Surgeon: Mai Burrell DPM;  Location: [facility] OR [address] [place] [address];  Service: Podiatry;  Laterality: Bilateral;  Graft application    TOE AMPUTATION Right 06/05/2015    TOE AMPUTATION Right 01/19/2017     Family History   Adopted: Yes   Problem Relation Age of Onset    Hypertension Mother     Cancer Mother         breast    Diabetes Mother     Hypertension Father     Cataracts Father     Heart disease Father         mi    Diabetes Sister     No Known Problems Brother     Heart disease Sister         MI    No Known Problems Sister     Hypertension Maternal Aunt     No Known Problems Maternal Uncle     No Known Problems Paternal Aunt     No Known Problems Paternal Uncle     No Known Problems Maternal Grandmother     No Known Problems Maternal Grandfather     No Known Problems Paternal Grandmother     No Known Problems Paternal Grandfather     Amblyopia Neg Hx     Blindness Neg Hx     Glaucoma Neg Hx     Macular degeneration Neg Hx     Retinal detachment Neg Hx     Strabismus Neg Hx     Stroke Neg Hx     Thyroid disease Neg Hx      Social History     Tobacco Use    Smoking status: Never Smoker    Smokeless tobacco: Never Used   Substance Use Topics    Alcohol use: No    Drug use: No       Review of Systems:  Constitutional: no fever or chills  Respiratory: no cough or shortness of breath  Cardiovascular: no chest pain or palpitations    OBJECTIVE:     Vital Signs (Most Recent)       Physical Exam:  General: well developed, well nourished  Lungs:  normal respiratory effort  Heart: regular rate, S1, S2 normal    Laboratory  CBC: No results for input(s): WBC, RBC, HGB, HCT, PLT, MCV, MCH, MCHC in the last 168 hours.  CMP: No results for input(s): GLU, CALCIUM, ALBUMIN, PROT, NA, K, CO2, CL, BUN, CREATININE, ALKPHOS, ALT, AST, BILITOT in the last 168 hours.  Coagulation: No  results for input(s): LABPROT, INR, APTT in the last 168 hours.      Diagnostic Results:      ASSESSMENT/PLAN:     Hepatic flexure polyp    Plan: Colonoscopy    Anesthesia Plan: MAC    ASA Grade: ASA 3 - Patient with moderate systemic disease with functional limitations       The impression and plan was discussed in detail with the patient. All questions have been answered and the patient voices understanding of our plan at this point. The risk of the procedure was discussed in detail which includes but not limited to bleeding, infection, perforation in some cases requiring surgery with its spectrum of complications.

## 2022-03-21 NOTE — TRANSFER OF CARE
"Anesthesia Transfer of Care Note    Patient: Indio Ryder Jr.    Procedure(s) Performed: Procedure(s) (LRB):  COLONOSCOPY (N/A)    Patient location: PACU    Anesthesia Type: general    Transport from OR: Transported from OR on 100% O2 by closed face mask with adequate spontaneous ventilation    Post pain: adequate analgesia    Post assessment: no apparent anesthetic complications and tolerated procedure well    Post vital signs: stable    Level of consciousness: responds to stimulation and sedated    Nausea/Vomiting: no nausea/vomiting    Complications: none    Transfer of care protocol was followed      Last vitals:   Visit Vitals  BP (!) 84/63 (BP Location: Left arm, Patient Position: Lying)   Pulse 78   Temp 36.7 °C (98.1 °F) (Temporal)   Resp 16   Ht 6' 1" (1.854 m)   Wt 102.1 kg (225 lb)   SpO2 100%   BMI 29.69 kg/m²     "

## 2022-03-21 NOTE — PROGRESS NOTES
Patient tolerating oral liquids without difficulty. No apparent s&s of distress noted at this time, no complaints voiced at this time. Discharge instructions reviewed with patient/family/friend (Sarah) with good verbal feedback received. Patient ready for discharge

## 2022-03-22 ENCOUNTER — HOSPITAL ENCOUNTER (OUTPATIENT)
Dept: WOUND CARE | Facility: HOSPITAL | Age: 52
Discharge: HOME | End: 2022-03-22
Attending: FAMILY MEDICINE
Payer: MEDICARE

## 2022-03-22 VITALS
RESPIRATION RATE: 20 BRPM | HEART RATE: 10 BPM | TEMPERATURE: 98 F | SYSTOLIC BLOOD PRESSURE: 162 MMHG | DIASTOLIC BLOOD PRESSURE: 89 MMHG

## 2022-03-22 DIAGNOSIS — L97.422 DIABETIC ULCER OF LEFT MIDFOOT ASSOCIATED WITH TYPE 2 DIABETES MELLITUS, WITH FAT LAYER EXPOSED: Primary | ICD-10-CM

## 2022-03-22 DIAGNOSIS — E11.621 DIABETIC ULCER OF LEFT MIDFOOT ASSOCIATED WITH TYPE 2 DIABETES MELLITUS, WITH FAT LAYER EXPOSED: Primary | ICD-10-CM

## 2022-03-22 PROCEDURE — 29581 APPL MULTLAYER CMPRN SYS LEG: CPT

## 2022-03-22 NOTE — PROGRESS NOTES
Called and left message for patient to return call. We can not get her in tomorrow for iud but was going to see if she could come Monday afternoon with . [facility]  [address] [place]  [address]  Nurse Visit    Subjective:       Patient seen in clinic today.  Dressing changed as ordered.      Assessment:          Wound 10/25/21 1500 Diabetic Ulcer Left midline;plantar Foot (Active)   10/25/21 1500    Pre-existing: Yes   Primary Wound Type: Diabetic ulc   Side: Left   Orientation: midline;plantar   Location: Foot   Wound Number:    Ankle-Brachial Index:    Pulses:    Removal Indication and Assessment:    Wound Outcome:    (Retired) Wound Type:    (Retired) Wound Length (cm):    (Retired) Wound Width (cm):    (Retired) Depth (cm):    Wound Description (Comments):    Removal Indications:    Wound WDL ex 03/22/22 1100   Dressing Appearance Intact;Moist drainage 03/22/22 1100   Drainage Amount Moderate 03/22/22 1100   Drainage Characteristics/Odor Serous;Malodorous 03/22/22 1100   Appearance Red 03/22/22 1100   Tissue loss description Partial thickness 03/22/22 1100   Periwound Area Macerated 03/22/22 1100   Care Cleansed with:;Soap and water;Sterile normal saline 03/22/22 1100   Dressing Changed 03/22/22 1100   Periwound Care Moisture barrier applied 03/22/22 1100   Compression Two layer compression 03/22/22 1100   Off Loading Football dressing;Off loading shoe 03/22/22 1100   Dressing Change Due 03/25/22 03/22/22 1100           Plan:     No orders of the defined types were placed in this encounter.          Follow up in about 3 days (around 3/25/2022) for wound care.

## 2022-03-22 NOTE — ANESTHESIA POSTPROCEDURE EVALUATION
Anesthesia Post Evaluation    Patient: Indio Ryder Jr.    Procedure(s) Performed: Procedure(s) (LRB):  COLONOSCOPY (N/A)    Final Anesthesia Type: general      Patient location during evaluation: PACU  Patient participation: Yes- Able to Participate  Level of consciousness: awake and alert and oriented  Post-procedure vital signs: reviewed and stable  Pain management: adequate  Airway patency: patent    PONV status at discharge: No PONV  Anesthetic complications: no      Cardiovascular status: blood pressure returned to baseline, hemodynamically stable and stable  Respiratory status: unassisted, room air and spontaneous ventilation  Hydration status: euvolemic  Follow-up not needed.          Vitals Value Taken Time   /95 03/21/22 1234   Temp 36.3 °C (97.3 °F) 03/21/22 1230   Pulse 75 03/21/22 1231   Resp 20 03/21/22 1230   SpO2 100 % 03/21/22 1231   Vitals shown include unvalidated device data.      No case tracking events are documented in the log.      Pain/Jenny Score: Jenny Score: 9 (3/21/2022 11:45 AM)

## 2022-03-25 ENCOUNTER — HOSPITAL ENCOUNTER (OUTPATIENT)
Dept: WOUND CARE | Facility: HOSPITAL | Age: 52
Discharge: HOME | End: 2022-03-25
Attending: PODIATRIST
Payer: MEDICARE

## 2022-03-25 VITALS
SYSTOLIC BLOOD PRESSURE: 192 MMHG | OXYGEN SATURATION: 99 % | DIASTOLIC BLOOD PRESSURE: 119 MMHG | HEART RATE: 85 BPM | TEMPERATURE: 97 F

## 2022-03-25 DIAGNOSIS — E11.621 DIABETIC ULCER OF LEFT MIDFOOT ASSOCIATED WITH TYPE 2 DIABETES MELLITUS, WITH FAT LAYER EXPOSED: Primary | ICD-10-CM

## 2022-03-25 DIAGNOSIS — L97.422 DIABETIC ULCER OF LEFT MIDFOOT ASSOCIATED WITH TYPE 2 DIABETES MELLITUS, WITH FAT LAYER EXPOSED: Primary | ICD-10-CM

## 2022-03-25 PROCEDURE — 15275 SKIN SUB GRAFT FACE/NK/HF/G: CPT | Performed by: PODIATRIST

## 2022-03-25 PROCEDURE — 15275 SKIN SUB GRAFT FACE/NK/HF/G: CPT | Mod: ,,, | Performed by: PODIATRIST

## 2022-03-25 PROCEDURE — 99499 UNLISTED E&M SERVICE: CPT | Mod: ,,, | Performed by: PODIATRIST

## 2022-03-25 PROCEDURE — 99499 NO LOS: ICD-10-PCS | Mod: ,,, | Performed by: PODIATRIST

## 2022-03-25 PROCEDURE — 15275 PR SKIN SUB GRAFT FACE/NK/HF/G UP TO 100 SQCM: ICD-10-PCS | Mod: ,,, | Performed by: PODIATRIST

## 2022-03-25 NOTE — PROGRESS NOTES
[facility]                Progress Note    Subjective:       Patient ID: Indio Ryder Jr. is a 51 y.o. male.    Chief Complaint: Wound Check    F/u wound care visit. Patient ambulatory to exam room with cam boot on and no difficulty noted, patient denies pain at present. Wound dressing to LLE intact with large amount of malodorous drainage noted.         Patient Active Problem List   Diagnosis    Essential hypertension    Mixed hyperlipidemia    Traumatic amputation of fifth toe of right foot    Type 2 diabetes mellitus with hyperglycemia, with long-term current use of insulin    Actinomyces infection    Hypokalemia    Neck pain    Diabetic ulcer of right midfoot associated with diabetes mellitus due to underlying condition, with fat layer exposed    Severe malnutrition    Hypertensive retinopathy, bilateral    Allergic reaction due to antibacterial drug    Chronic left shoulder pain    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 edema, with long-term current use of insulin    Diabetic ulcer of left foot    Septic arthritis of left foot    Acute on chronic osteomyelitis    Proteus infection       Current Outpatient Medications on File Prior to Encounter   Medication Sig Dispense Refill    acetaminophen (TYLENOL) 325 MG tablet Take 2 tablets (650 mg total) by mouth every 6 (six) hours as needed. 30 tablet 0    aspirin 81 MG Chew Chew and swallow 1 tablet (81 mg total) by mouth once daily. 90 tablet 3    [] ciprofloxacin HCl (CIPRO) 250 MG tablet Take 1 tablet (250 mg total) by mouth every 12 (twelve) hours. for 20 days 40 tablet 0    dulaglutide (TRULICITY) 1.5 mg/0.5 mL pen injector Inject 1.5 mg into the skin every 7 days. 4 pen 5    flu vacc [id number] 6mos up,PF, 60 mcg (15 mcg x 4)/0.5 mL Syrg inject IM by [facility] 0.5 mL 0    insulin aspart U-100 (NOVOLOG) 100 unit/mL (3  "mL) InPn pen Inject 8 units before meals plus scale 150-200+2, 201-250+4, 251-300+6, 301-350+8, >350+10. Max daily 54 units. 15 mL 6    insulin detemir U-100 (LEVEMIR FLEXTOUCH) 100 unit/mL (3 mL) SubQ InPn pen Inject 24 Units into the skin every evening. 15 mL 6    lisinopriL (PRINIVIL,ZESTRIL) 20 MG tablet Take 1 tablet (20 mg total) by mouth once daily. 90 tablet 3    metFORMIN (GLUCOPHAGE) 500 MG tablet Take 2 tablets by mouth in the morning, and take 2 tablets by mouth at night. (Patient taking differently: Take 2 tablets by mouth in the morning, and take 2 tablets by mouth at night.) 360 tablet 3    ONETOUCH DELICA LANCETS 33 gauge Misc       ONETOUCH ULTRA2 kit       pen needle, diabetic (BD SASCHA 2ND GEN PEN NEEDLE) 32 gauge x 5/32" Ndle Use 4 times a day (Patient taking differently: Use 4 times a day) 400 each 3    permethrin (ELIMITE) 5 % cream Apply neck down at night for 1 application. Wash off in morning and Repeat in 1 week (Patient not taking: No sig reported) 60 g 1    [DISCONTINUED] atorvastatin (LIPITOR) 80 MG tablet Take 1 tablet (80 mg total) by mouth once daily. (Patient not taking: No sig reported) 90 tablet 0    [DISCONTINUED] clopidogreL (PLAVIX) 75 mg tablet Take 1 tablet (75 mg total) by mouth once daily. (Patient not taking: No sig reported) 30 tablet 11     Current Facility-Administered Medications on File Prior to Encounter   Medication Dose Route Frequency Provider Last Rate Last Admin    heparin, porcine (PF) 100 unit/mL injection flush 10 Units  10 Units Intravenous PRN Esthela Diggs MD   500 Units at 05/19/21 1308       Review of patient's allergies indicates:  No Known Allergies    Past Surgical History:   Procedure Laterality Date    COLONOSCOPY Right 1/19/2022    Procedure: COLONOSCOPY;  Surgeon: Tj Haile MD;  Location: [address]);  Service: Endoscopy;  Laterality: Right;  previous order un-usable/poor prep 1/18/22  RAPID COVID test arrival " 12:20  instructions handed to pt- sm    COLONOSCOPY N/A 3/21/2022    Procedure: COLONOSCOPY;  Surgeon: Sheng Haque MD;  Location: [facility] ENDO (2ND FLR);  Service: Endoscopy;  Laterality: N/A;  Colonoscopy with AES for hepatix flexure polyp removal, 2nd floor  Pt is fully vaccinated-DS  Pt prescribed Plavix by Dr. Stahl in Jan. 2022, however pt states that he never started taking it-DS  3/16/22-Instructions sent via portal-DS    DEBRIDEMENT OF FOOT Left 7/14/2019    Procedure: DEBRIDEMENT, FOOT, with left 5th ray amputation;  Surgeon: Sis Hickman DPM;  Location: [facility] OR 2ND FLR;  Service: Podiatry;  Laterality: Left;    DEBRIDEMENT OF FOOT Left 7/17/2019    Procedure: DEBRIDEMENT, FOOT with leftt 5th ray partial amputation with Neox Graft;  Surgeon: Mai Burrell DPM;  Location: [facility] OR 2ND FLR;  Service: Podiatry;  Laterality: Left;  t    DEBRIDEMENT OF FOOT Bilateral 4/29/2021    Procedure: DEBRIDEMENT, FOOT;  Surgeon: Mai Burrell DPM;  Location: [facility] OR 1ST FLR;  Service: Podiatry;  Laterality: Bilateral;  Graft application    TOE AMPUTATION Right 06/05/2015    TOE AMPUTATION Right 01/19/2017       Family History   Adopted: Yes   Problem Relation Age of Onset    Hypertension Mother     Cancer Mother         breast    Diabetes Mother     Hypertension Father     Cataracts Father     Heart disease Father         mi    Diabetes Sister     No Known Problems Brother     Heart disease Sister         MI    No Known Problems Sister     Hypertension Maternal Aunt     No Known Problems Maternal Uncle     No Known Problems Paternal Aunt     No Known Problems Paternal Uncle     No Known Problems Maternal Grandmother     No Known Problems Maternal Grandfather     No Known Problems Paternal Grandmother     No Known Problems Paternal Grandfather     Amblyopia Neg Hx     Blindness Neg Hx     Glaucoma Neg Hx     Macular degeneration Neg Hx     Retinal detachment Neg Hx      Strabismus Neg Hx     Stroke Neg Hx     Thyroid disease Neg Hx        Social History     Socioeconomic History    Marital status: Single    Number of children: 0   Occupational History    Occupation: Retired     Employer: [employer]   Tobacco Use    Smoking status: Never Smoker    Smokeless tobacco: Never Used   Substance and Sexual Activity    Alcohol use: No    Drug use: No    Sexual activity: Yes     Partners: Female     Birth control/protection: Condom           Review of Systems   Constitutional: Negative for activity change, appetite change, chills, fatigue and fever.   HENT: Negative for hearing loss.    Eyes: Negative for photophobia and visual disturbance.   Respiratory: Negative for cough, chest tightness, shortness of breath and wheezing.    Cardiovascular: Positive for leg swelling. Negative for chest pain and palpitations.   Gastrointestinal: Negative for constipation, diarrhea, nausea and vomiting.   Endocrine: Negative for cold intolerance and heat intolerance.   Genitourinary: Negative for flank pain.   Musculoskeletal: Positive for gait problem and myalgias. Negative for neck pain and neck stiffness.   Skin: Positive for wound. Negative for color change.   Neurological: Positive for numbness. Negative for weakness, light-headedness and headaches.        + paresthesia    Psychiatric/Behavioral: Negative for sleep disturbance.         Objective:        Physical Exam  Vitals reviewed.   Constitutional:       Appearance: He is well-developed.   Cardiovascular:      Comments: dorsalis pedis and posterior tibial pulses are palpable bilaterally. Capillary refill time is within normal limits. + pedal hair growth         Musculoskeletal:         General: No tenderness. Normal range of motion.      Comments: Normal angle, base, station of gait. All toes without clubbing, cyanosis, or signs of ischemia.  No pain to palpation bilateral lower extremities.  Range of motion, stability, muscle  strength, and muscle tone normal bilateral feet and legs.    Skin:     General: Skin is warm and dry.      Coloration: Skin is not ashen or cyanotic.      Findings: Wound (see below) present. No rash.      Nails: There is no clubbing.   Neurological:      Mental Status: He is alert and oriented to person, place, and time.      Sensory: No sensory deficit.      Comments: Diminished/loss of protective sensation all toes bilateral to 10 gram monofilament.     Psychiatric:         Behavior: Behavior normal.         Vitals:    03/25/22 1023   BP: (!) 192/119   Pulse: 85   Temp: 97.3 °F (36.3 °C)       Assessment:           ICD-10-CM ICD-9-CM   1. Diabetic ulcer of left midfoot associated with type 2 diabetes mellitus, with fat layer exposed  E11.621 250.80    L97.422 707.14            Wound 10/25/21 1500 Diabetic Ulcer Left midline;plantar Foot (Active)   10/25/21 1500    Pre-existing: Yes   Primary Wound Type: Diabetic ulc   Side: Left   Orientation: midline;plantar   Location: Foot   Wound Number:    Ankle-Brachial Index:    Pulses:    Removal Indication and Assessment:    Wound Outcome:    (Retired) Wound Type:    (Retired) Wound Length (cm):    (Retired) Wound Width (cm):    (Retired) Depth (cm):    Wound Description (Comments):    Removal Indications:    Wound Image   03/25/22 1000   Wound WDL ex 03/25/22 1000   Dressing Appearance Intact;Moist drainage 03/25/22 1000   Drainage Amount Large 03/25/22 1000   Drainage Characteristics/Odor Serous;Malodorous 03/25/22 1000   Appearance Red 03/25/22 1000   Tissue loss description Partial thickness 03/25/22 1000   Black (%), Wound Tissue Color 0 % 03/25/22 1000   Red (%), Wound Tissue Color 100 % 03/25/22 1000   Yellow (%), Wound Tissue Color 0 % 03/25/22 1000   Periwound Area Macerated 03/25/22 1000   Wound Edges Defined 03/25/22 1000   Wound Length (cm) 4.5 cm 03/25/22 1000   Wound Width (cm) 2.4 cm 03/25/22 1000   Wound Depth (cm) 0.5 cm 03/25/22 1000   Wound Volume  (cm^3) 5.4 cm^3 03/25/22 1000   Wound Surface Area (cm^2) 10.8 cm^2 03/25/22 1000   Care Cleansed with:;Antimicrobial agent;Soap and water;Sterile normal saline 03/25/22 1000   Dressing Changed 03/25/22 1000   Periwound Care Moisture barrier applied 03/25/22 1000   Compression Two layer compression 03/25/22 1000   Off Loading Football dressing;Off loading shoe 03/25/22 1000   Dressing Change Due 03/29/22 03/25/22 1000           Plan:            Wound measuring 0.3cm larger width and 0.1cm larger depth.  Patient instructed on importance of keeping LLE elevated as much as possible to control drainage and edema, patient verbalized understanding.      NEOX OP REPORT    SURGEON: Marivel Peterson DPM  ASSITANT: None  PRE-OP DX: Ulceration secondary to diabetes mellitus and peripheral neuropathy with sluggish response to prior treatment.  POST-OP DX: same.  ANESTHESIA: Pt. has loss of sensation and therefore no anesthesia was required.  HEMOSTASIS: pressure  EBL: less than 10cc.    Time Out performed to verify patient and site and consent obtained.    Procedure: The patient was seen in the wound clinic room and placed in supine position on the treatment table.  Attention was directed to the ulcer which was located on the left foot, where an ulceration measuring  4.5x2.4x0.5 cm is noted.  The ulceration was covered with fibrin and a mild callused rim with periwound maceration.  No acute signs of infection were noted.  The wound is nontender.  Utilizing a 3 mm curette, I debrided 100% of the wound full-thickness through subcutaneous tissue to excise viable and nonviable tissue outside the wound margins.  Patient tolerated well.  The wound was flushed with sterile saline.  There was minimal bleeding with debridement which was well controlled with direct pressure.  A NEOX sheet was then inspected and noted to be in acceptable.  It was then removed sterilely.  The sheet was then fenestrated with the scalpel and then cut and sized  and shaped to the wound.  I utilized 80% of the 6x3 cm neox1K with overlapping edges against the wound and discarded the remainder.  The NEOX was adhered down with saline soaked cotton swabs and then secured in place 3-0 prolene and covered with adaptic touch non-adherent layer and application of offloading dressing.  The patient having tolerated the procedure well left the clinic with all vital signs stable and vascular status intact to all digits of both feet.     Short-term goals including promoting granulation and epithelialization of the wound. Long term goals include maintaining a healed wound with appropriate offloading and good medical management per internist.      Orders Placed This Encounter   Procedures    Change dressing     Clean with NS. Dakin's soak x15min. Cavilon/benzoin to plantar foot. Gentian violet to periwound. Neox with sutures, adaptic touch, steristrips per MD. Drawtex cut to fit. Custom fit foam pad to plantar foot (cut per MD). Mextra short x1 secured with medipore tape. Football (cast padding x3). Coflex calamine (make sure calamine is first layer). Change at nurse visit, make sure to keep adaptic and steristrips intact.        Follow up in about 4 days (around 3/29/2022) for nurse visit.

## 2022-03-29 ENCOUNTER — HOSPITAL ENCOUNTER (OUTPATIENT)
Dept: WOUND CARE | Facility: HOSPITAL | Age: 52
Discharge: HOME | End: 2022-03-29
Attending: FAMILY MEDICINE
Payer: MEDICARE

## 2022-03-29 VITALS
DIASTOLIC BLOOD PRESSURE: 93 MMHG | SYSTOLIC BLOOD PRESSURE: 174 MMHG | HEART RATE: 81 BPM | TEMPERATURE: 98 F | BODY MASS INDEX: 29.69 KG/M2 | HEIGHT: 73 IN

## 2022-03-29 PROCEDURE — 29581 APPL MULTLAYER CMPRN SYS LEG: CPT

## 2022-03-29 NOTE — PROGRESS NOTES
[facility] [address]  [facility] Visit    Subjective:       Patient seen in clinic today.  Dressing changed as ordered.      Assessment:          Wound 10/25/21 1500 Diabetic Ulcer Left midline;plantar Foot (Active)   10/25/21 1500    Pre-existing: Yes   Primary Wound Type: Diabetic ulc   Side: Left   Orientation: midline;plantar   Location: Foot   Wound Number:    Ankle-Brachial Index:    Pulses:    Removal Indication and Assessment:    Wound Outcome:    (Retired) Wound Type:    (Retired) Wound Length (cm):    (Retired) Wound Width (cm):    (Retired) Depth (cm):    Wound Description (Comments):    Removal Indications:    Wound WDL ex 03/29/22 1700   Dressing Appearance Intact;Moist drainage 03/29/22 1700   Drainage Amount Large 03/29/22 1700   Drainage Characteristics/Odor Malodorous 03/29/22 1700   Appearance Red;Sutures intact 03/29/22 1700   Tissue loss description Partial thickness 03/29/22 1700   Red (%), Wound Tissue Color 100 % 03/29/22 1700   Periwound Area Macerated 03/29/22 1700   Wound Edges Defined 03/29/22 1700   Care Cleansed with:;Antimicrobial agent;Sterile normal saline 03/29/22 1700   Dressing Changed 03/29/22 1700   Compression Two layer compression 03/29/22 1700   Off Loading Football dressing;Off loading shoe 03/29/22 1700   Dressing Change Due 04/05/22 03/29/22 1700           Plan:     No orders of the defined types were placed in this encounter.          No follow-ups on file.        [facility]  [address]  Nurse Visit    Subjective:       Patient seen in clinic today.  Dressing changed as ordered.      Assessment:          Wound 10/25/21 1500 Diabetic Ulcer Left midline;plantar Foot (Active)   10/25/21 1500    Pre-existing: Yes   Primary Wound Type: Diabetic ulc   Side: Left   Orientation: midline;plantar   Location: Foot   Wound Number:    Ankle-Brachial Index:    Pulses:    Removal  Indication and Assessment:    Wound Outcome:    (Retired) Wound Type:    (Retired) Wound Length (cm):    (Retired) Wound Width (cm):    (Retired) Depth (cm):    Wound Description (Comments):    Removal Indications:    Wound WDL ex 03/29/22 1700   Dressing Appearance Intact;Moist drainage 03/29/22 1700   Drainage Amount Large 03/29/22 1700   Drainage Characteristics/Odor Malodorous 03/29/22 1700   Appearance Red;Sutures intact 03/29/22 1700   Tissue loss description Partial thickness 03/29/22 1700   Red (%), Wound Tissue Color 100 % 03/29/22 1700   Periwound Area Macerated 03/29/22 1700   Wound Edges Defined 03/29/22 1700   Care Cleansed with:;Antimicrobial agent;Sterile normal saline 03/29/22 1700   Dressing Changed 03/29/22 1700   Compression Two layer compression 03/29/22 1700   Off Loading Football dressing;Off loading shoe 03/29/22 1700   Dressing Change Due 04/05/22 03/29/22 1700           Plan:     No orders of the defined types were placed in this encounter.          No follow-ups on file.        [facility]                Progress Note    Subjective:       Patient ID: Indio Ryder Jr. is a 51 y.o. male.    Chief Complaint: Walked to clinic unaided Continues with foul smell to wound. Lrg drainage grey in color Dakins wash to wound with gentle patting over adaptic and sutures replaced over existing adaptic.   HPI    Review of Systems      Objective:        Physical Exam    Vitals:    03/29/22 0958   BP: (!) 174/93   Pulse: 81   Temp: 97.5 °F (36.4 °C)       Assessment:         No diagnosis found.         Wound 10/25/21 1500 Diabetic Ulcer Left midline;plantar Foot (Active)   10/25/21 1500    Pre-existing: Yes   Primary Wound Type: Diabetic ulc   Side: Left   Orientation: midline;plantar   Location: Foot   Wound Number:    Ankle-Brachial Index:    Pulses:    Removal Indication and Assessment:    Wound Outcome:    (Retired) Wound Type:    (Retired) Wound Length  (cm):    (Retired) Wound Width (cm):    (Retired) Depth (cm):    Wound Description (Comments):    Removal Indications:    Wound WDL ex 03/29/22 1700   Dressing Appearance Intact;Moist drainage 03/29/22 1700   Drainage Amount Large 03/29/22 1700   Drainage Characteristics/Odor Malodorous 03/29/22 1700   Appearance Red;Sutures intact 03/29/22 1700   Tissue loss description Partial thickness 03/29/22 1700   Red (%), Wound Tissue Color 100 % 03/29/22 1700   Periwound Area Macerated 03/29/22 1700   Wound Edges Defined 03/29/22 1700   Care Cleansed with:;Antimicrobial agent;Sterile normal saline 03/29/22 1700   Dressing Changed 03/29/22 1700   Compression Two layer compression 03/29/22 1700   Off Loading Football dressing;Off loading shoe 03/29/22 1700   Dressing Change Due 04/05/22 03/29/22 1700           Plan:                No orders of the defined types were placed in this encounter.       No follow-ups on file.

## 2022-03-30 LAB
FINAL PATHOLOGIC DIAGNOSIS: NORMAL
Lab: NORMAL

## 2022-04-01 ENCOUNTER — HOSPITAL ENCOUNTER (OUTPATIENT)
Dept: WOUND CARE | Facility: HOSPITAL | Age: 52
Discharge: HOME | End: 2022-04-01
Attending: PODIATRIST
Payer: MEDICARE

## 2022-04-01 VITALS
TEMPERATURE: 97 F | HEIGHT: 73 IN | SYSTOLIC BLOOD PRESSURE: 160 MMHG | DIASTOLIC BLOOD PRESSURE: 98 MMHG | HEART RATE: 89 BPM | BODY MASS INDEX: 29.69 KG/M2

## 2022-04-01 DIAGNOSIS — E11.621 DIABETIC ULCER OF LEFT FOOT: ICD-10-CM

## 2022-04-01 DIAGNOSIS — E11.621 DIABETIC ULCER OF LEFT MIDFOOT ASSOCIATED WITH TYPE 2 DIABETES MELLITUS, WITH FAT LAYER EXPOSED: Primary | ICD-10-CM

## 2022-04-01 DIAGNOSIS — L97.529 DIABETIC ULCER OF LEFT FOOT: ICD-10-CM

## 2022-04-01 DIAGNOSIS — L97.422 DIABETIC ULCER OF LEFT MIDFOOT ASSOCIATED WITH TYPE 2 DIABETES MELLITUS, WITH FAT LAYER EXPOSED: Primary | ICD-10-CM

## 2022-04-01 PROCEDURE — 99213 OFFICE O/P EST LOW 20 MIN: CPT | Mod: ,,, | Performed by: PODIATRIST

## 2022-04-01 PROCEDURE — 29581 APPL MULTLAYER CMPRN SYS LEG: CPT

## 2022-04-01 PROCEDURE — 99213 PR OFFICE/OUTPT VISIT, EST, LEVL III, 20-29 MIN: ICD-10-PCS | Mod: ,,, | Performed by: PODIATRIST

## 2022-04-01 NOTE — PROGRESS NOTES
[facility]                Progress Note    Subjective:       Patient ID: Indio Ryder Jr. is a 51 y.o. male.    Chief Complaint: No chief complaint on file.    Follow up wound care visit. Patient ambulated to exam room without assistance, prescribed CAM boot on LLE. No c/o pain at present. Dressing intact with moderate amount of serosanguineous, brown drainage. Neox still intact w/sutures in place.      Review of Systems   Constitutional: Negative for activity change, appetite change, chills, fatigue and fever.   HENT: Negative for hearing loss.    Eyes: Negative for photophobia and visual disturbance.   Respiratory: Negative for cough, chest tightness, shortness of breath and wheezing.    Cardiovascular: Positive for leg swelling. Negative for chest pain and palpitations.   Gastrointestinal: Negative for constipation, diarrhea, nausea and vomiting.   Endocrine: Negative for cold intolerance and heat intolerance.   Genitourinary: Negative for flank pain.   Musculoskeletal: Positive for gait problem and myalgias. Negative for neck pain and neck stiffness.   Skin: Positive for wound. Negative for color change.   Neurological: Positive for numbness. Negative for weakness, light-headedness and headaches.        + paresthesia    Psychiatric/Behavioral: Negative for sleep disturbance.         Objective:        Physical Exam  Vitals reviewed.   Constitutional:       Appearance: He is well-developed.   Cardiovascular:      Comments: dorsalis pedis and posterior tibial pulses are palpable bilaterally. Capillary refill time is within normal limits. + pedal hair growth         Musculoskeletal:         General: No tenderness. Normal range of motion.      Comments: Normal angle, base, station of gait. All toes without clubbing, cyanosis, or signs of ischemia.  No pain to palpation bilateral lower extremities.  Range of motion, stability, muscle strength, and muscle tone normal  bilateral feet and legs.    Skin:     General: Skin is warm and dry.      Coloration: Skin is not ashen or cyanotic.      Findings: Wound (see below) present. No rash.      Nails: There is no clubbing.   Neurological:      Mental Status: He is alert and oriented to person, place, and time.      Sensory: No sensory deficit.      Comments: Diminished/loss of protective sensation all toes bilateral to 10 gram monofilament.     Psychiatric:         Behavior: Behavior normal.         Vitals:    04/01/22 1054   BP: (!) 160/98   Pulse: 89   Temp: 97.3 °F (36.3 °C)       Assessment:           ICD-10-CM ICD-9-CM   1. Diabetic ulcer of left midfoot associated with type 2 diabetes mellitus, with fat layer exposed  E11.621 250.80    L97.422 707.14   2. Diabetic ulcer of left foot  E11.621 250.80    L97.529 707.15            Wound 10/25/21 1500 Diabetic Ulcer Left plantar;distal Foot (Active)   10/25/21 1500    Pre-existing: Yes   Primary Wound Type: Diabetic ulc   Side: Left   Orientation: plantar;distal   Location: Foot   Wound Number:    Ankle-Brachial Index:    Pulses:    Removal Indication and Assessment:    Wound Outcome: Other   (Retired) Wound Type:    (Retired) Wound Length (cm):    (Retired) Wound Width (cm):    (Retired) Depth (cm):    Wound Description (Comments):    Removal Indications:    Wound Image   04/01/22 1100   Wound WDL ex 04/01/22 1100   Dressing Appearance Intact;Moist drainage 04/01/22 1100   Drainage Amount Large 04/01/22 1100   Drainage Characteristics/Odor Serosanguineous;Brown 04/01/22 1100   Appearance Red;Sutures intact 04/01/22 1100   Tissue loss description Full thickness 04/01/22 1100   Red (%), Wound Tissue Color 100 % 04/01/22 1100   Periwound Area Macerated;Pale white 04/01/22 1100   Wound Edges Open 04/01/22 1100   Wound Length (cm) 2.5 cm 04/01/22 1100   Wound Width (cm) 1.6 cm 04/01/22 1100   Wound Depth (cm) 0.2 cm 04/01/22 1100   Wound Volume (cm^3) 0.8 cm^3 04/01/22 1100   Wound Surface  "Area (cm^2) 4 cm^2 04/01/22 1100   Care Cleansed with:;Antimicrobial agent;Sterile normal saline 04/01/22 1100   Dressing Changed 04/01/22 1100   Periwound Care Moisture barrier applied 04/01/22 1100   Compression Two layer compression 04/01/22 1100   Off Loading Football dressing;Off loading shoe 04/01/22 1100   Dressing Change Due 04/08/22 04/01/22 1100           Plan:            Left Plantar Foot wound with neox intact.  Plan for reapplication on next encounter. Still measuring the same as previous in length and width, with continued large island of skin between wound beds.     Encouraged rest and elevation    Wound care done as per order, return for Nurse visit on Tuesday and see MD in 1 week.      Orders Placed This Encounter   Procedures    Change dressing     Clean with NS. Cavilon/benzoin to plantar foot. Gentian violet to periwound (N/A). Neox (with sutures in place), Adaptic touch then 1/2" steristrips per MD. Custom fit foam pad to plantar foot (with 2 holes cut to allow drainage per MD). Aquacel extra then Mextra short x1 secured with medipore tape. Football (cast padding x3). Coflex calamine (make sure calamine is first layer).     Change at nurse visit, make sure to keep Adaptic and Steristrips intact. May use Drawtex if needed depending on  drainage.        Follow up in about 4 days (around 4/5/2022).       "

## 2022-04-04 ENCOUNTER — TELEPHONE (OUTPATIENT)
Dept: ENDOSCOPY | Facility: HOSPITAL | Age: 52
End: 2022-04-04
Payer: MEDICARE

## 2022-04-04 NOTE — TELEPHONE ENCOUNTER
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 Need to see CRS.  Thanks,  Sheng Haque MD

## 2022-04-05 ENCOUNTER — HOSPITAL ENCOUNTER (OUTPATIENT)
Dept: WOUND CARE | Facility: HOSPITAL | Age: 52
Discharge: HOME | End: 2022-04-05
Attending: FAMILY MEDICINE
Payer: MEDICARE

## 2022-04-05 VITALS
RESPIRATION RATE: 20 BRPM | SYSTOLIC BLOOD PRESSURE: 161 MMHG | TEMPERATURE: 98 F | DIASTOLIC BLOOD PRESSURE: 92 MMHG | HEART RATE: 84 BPM

## 2022-04-05 DIAGNOSIS — L97.422 DIABETIC ULCER OF LEFT MIDFOOT ASSOCIATED WITH TYPE 2 DIABETES MELLITUS, WITH FAT LAYER EXPOSED: Primary | ICD-10-CM

## 2022-04-05 DIAGNOSIS — E11.621 DIABETIC ULCER OF LEFT MIDFOOT ASSOCIATED WITH TYPE 2 DIABETES MELLITUS, WITH FAT LAYER EXPOSED: Primary | ICD-10-CM

## 2022-04-05 PROCEDURE — 29581 APPL MULTLAYER CMPRN SYS LEG: CPT

## 2022-04-05 NOTE — PROGRESS NOTES
[facility] [address] [place]  [address]  Nurse Visit    Subjective:       Patient seen in clinic today.  Dressing changed as ordered.      Assessment:          Wound 10/25/21 1500 Diabetic Ulcer Left midline;plantar Foot (Active)   10/25/21 1500    Pre-existing: Yes   Primary Wound Type: Diabetic ulc   Side: Left   Orientation: midline;plantar   Location: Foot   Wound Number:    Ankle-Brachial Index:    Pulses:    Removal Indication and Assessment:    Wound Outcome:    (Retired) Wound Type:    (Retired) Wound Length (cm):    (Retired) Wound Width (cm):    (Retired) Depth (cm):    Wound Description (Comments):    Removal Indications:    Wound WDL ex 04/05/22 1000   Dressing Appearance Intact;Moist drainage 04/05/22 1000   Drainage Amount Large 04/05/22 1000   Drainage Characteristics/Odor Serosanguineous;Malodorous 04/05/22 1000   Appearance Dressing in place, unable to visualize 04/05/22 1000   Periwound Area Macerated 04/05/22 1000   Wound Edges Open 04/05/22 1000   Care Cleansed with:;Soap and water 04/05/22 1000   Dressing Changed 04/05/22 1000   Periwound Care Skin barrier film applied 04/05/22 1000   Compression Two layer compression 04/05/22 1000   Off Loading Football dressing;Off loading shoe 04/05/22 1000   Dressing Change Due 04/08/22 04/05/22 1000           Plan:     No orders of the defined types were placed in this encounter.          Follow up in about 3 days (around 4/8/2022) for wound care visit.

## 2022-04-06 DIAGNOSIS — C18.9 COLON CANCER: Primary | ICD-10-CM

## 2022-04-08 ENCOUNTER — HOSPITAL ENCOUNTER (OUTPATIENT)
Dept: WOUND CARE | Facility: HOSPITAL | Age: 52
Discharge: HOME | End: 2022-04-08
Attending: PODIATRIST
Payer: MEDICARE

## 2022-04-08 VITALS — TEMPERATURE: 98 F | SYSTOLIC BLOOD PRESSURE: 180 MMHG | DIASTOLIC BLOOD PRESSURE: 105 MMHG | HEART RATE: 84 BPM

## 2022-04-08 DIAGNOSIS — E11.621 DIABETIC ULCER OF LEFT FOOT: ICD-10-CM

## 2022-04-08 DIAGNOSIS — E11.621 DIABETIC ULCER OF LEFT MIDFOOT ASSOCIATED WITH TYPE 2 DIABETES MELLITUS, WITH FAT LAYER EXPOSED: Primary | ICD-10-CM

## 2022-04-08 DIAGNOSIS — L97.422 DIABETIC ULCER OF LEFT MIDFOOT ASSOCIATED WITH TYPE 2 DIABETES MELLITUS, WITH FAT LAYER EXPOSED: Primary | ICD-10-CM

## 2022-04-08 DIAGNOSIS — E08.621 DIABETIC ULCER OF RIGHT MIDFOOT ASSOCIATED WITH DIABETES MELLITUS DUE TO UNDERLYING CONDITION, WITH FAT LAYER EXPOSED: ICD-10-CM

## 2022-04-08 DIAGNOSIS — L97.529 DIABETIC ULCER OF LEFT FOOT: ICD-10-CM

## 2022-04-08 DIAGNOSIS — L97.412 DIABETIC ULCER OF RIGHT MIDFOOT ASSOCIATED WITH DIABETES MELLITUS DUE TO UNDERLYING CONDITION, WITH FAT LAYER EXPOSED: ICD-10-CM

## 2022-04-08 PROCEDURE — 15275 PR SKIN SUB GRAFT FACE/NK/HF/G UP TO 100 SQCM: ICD-10-PCS | Mod: ,,, | Performed by: PODIATRIST

## 2022-04-08 PROCEDURE — 15271 SKIN SUB GRAFT TRNK/ARM/LEG: CPT

## 2022-04-08 PROCEDURE — 99499 UNLISTED E&M SERVICE: CPT | Mod: ,,, | Performed by: PODIATRIST

## 2022-04-08 PROCEDURE — 15271 SKIN SUB GRAFT TRNK/ARM/LEG: CPT | Performed by: PODIATRIST

## 2022-04-08 PROCEDURE — 99499 NO LOS: ICD-10-PCS | Mod: ,,, | Performed by: PODIATRIST

## 2022-04-08 PROCEDURE — 15275 SKIN SUB GRAFT FACE/NK/HF/G: CPT | Mod: ,,, | Performed by: PODIATRIST

## 2022-04-08 NOTE — PROGRESS NOTES
[facility]                Progress Note    Subjective:       Patient ID: Indio Ryder Jr. is a 51 y.o. male.    Chief Complaint: No chief complaint on file.    Follow up wound care visit. Patient ambulated to exam room without assistance, prescribed CAM boot on LLE. No c/o pain at present. Dressing intact with large amount of serosanguineous, non-malodor drainage. eeks.      Review of Systems   Constitutional: Negative for activity change, appetite change, chills, fatigue and fever.   HENT: Negative for hearing loss.    Eyes: Negative for photophobia and visual disturbance.   Respiratory: Negative for cough, chest tightness, shortness of breath and wheezing.    Cardiovascular: Positive for leg swelling. Negative for chest pain and palpitations.   Gastrointestinal: Negative for constipation, diarrhea, nausea and vomiting.   Endocrine: Negative for cold intolerance and heat intolerance.   Genitourinary: Negative for flank pain.   Musculoskeletal: Positive for gait problem and myalgias. Negative for neck pain and neck stiffness.   Skin: Positive for wound. Negative for color change.   Neurological: Positive for numbness. Negative for weakness, light-headedness and headaches.        + paresthesia    Psychiatric/Behavioral: Negative for sleep disturbance.         Objective:        Physical Exam  Vitals reviewed.   Constitutional:       Appearance: He is well-developed.   Cardiovascular:      Comments: dorsalis pedis and posterior tibial pulses are palpable bilaterally. Capillary refill time is within normal limits. + pedal hair growth         Musculoskeletal:         General: No tenderness. Normal range of motion.      Comments: Normal angle, base, station of gait. All toes without clubbing, cyanosis, or signs of ischemia.  No pain to palpation bilateral lower extremities.  Range of motion, stability, muscle strength, and muscle tone normal bilateral feet and legs.     Skin:     General: Skin is warm and dry.      Coloration: Skin is not ashen or cyanotic.      Findings: Wound (see below) present. No rash.      Nails: There is no clubbing.   Neurological:      Mental Status: He is alert and oriented to person, place, and time.      Sensory: No sensory deficit.      Comments: Diminished/loss of protective sensation all toes bilateral to 10 gram monofilament.     Psychiatric:         Behavior: Behavior normal.         Vitals:    04/08/22 1035   BP: (!) 180/105   Pulse: 84   Temp: 97.7 °F (36.5 °C)       Assessment:           ICD-10-CM ICD-9-CM   1. Diabetic ulcer of left midfoot associated with type 2 diabetes mellitus, with fat layer exposed  E11.621 250.80    L97.422 707.14   2. Diabetic ulcer of right midfoot associated with diabetes mellitus due to underlying condition, with fat layer exposed  E08.621 249.80    L97.412 707.14   3. Diabetic ulcer of left foot  E11.621 250.80    L97.529 707.15            Wound 10/25/21 1500 Diabetic Ulcer Left plantar;distal Foot (Active)   10/25/21 1500    Pre-existing: Yes   Primary Wound Type: Diabetic ulc   Side: Left   Orientation: plantar;distal   Location: Foot   Wound Number:    Ankle-Brachial Index:    Pulses:    Removal Indication and Assessment:    Wound Outcome: Other   (Retired) Wound Type:    (Retired) Wound Length (cm):    (Retired) Wound Width (cm):    (Retired) Depth (cm):    Wound Description (Comments):    Removal Indications:    Wound Image   04/08/22 1100   Wound WDL ex 04/08/22 1100   Dressing Appearance Intact;Moist drainage 04/08/22 1100   Drainage Amount Large 04/08/22 1100   Drainage Characteristics/Odor Serosanguineous;No odor 04/08/22 1100   Appearance Red;Sutures intact 04/08/22 1100   Tissue loss description Full thickness 04/08/22 1100   Red (%), Wound Tissue Color 100 % 04/08/22 1100   Periwound Area Macerated;Moist;Pale white 04/08/22 1100   Wound Edges Open 04/08/22 1100   Wound Length (cm) 0.8 cm 04/08/22 1100    Wound Width (cm) 1 cm 04/08/22 1100   Wound Depth (cm) 0.2 cm 04/08/22 1100   Wound Volume (cm^3) 0.16 cm^3 04/08/22 1100   Wound Surface Area (cm^2) 0.8 cm^2 04/08/22 1100   Care Cleansed with:;Antimicrobial agent;Sterile normal saline;Wound cleanser;Sutures removed 04/08/22 1100   Dressing Changed 04/08/22 1100   Periwound Care Skin barrier film applied 04/08/22 1100   Compression Two layer compression 04/08/22 1100   Off Loading Football dressing;Off loading shoe 04/08/22 1100   Dressing Change Due 04/11/22 04/08/22 1100            Wound 04/08/22 1137 Diabetic Ulcer Left plantar;medial Foot (Active)   04/08/22 1137    Pre-existing: Yes   Primary Wound Type: Diabetic ulc   Side: Left   Orientation: plantar;medial   Location: Foot   Wound Number:    Ankle-Brachial Index:    Pulses:    Removal Indication and Assessment:    Wound Outcome:    (Retired) Wound Type:    (Retired) Wound Length (cm):    (Retired) Wound Width (cm):    (Retired) Depth (cm):    Wound Description (Comments):    Removal Indications:    Wound Image   04/08/22 1100   Wound WDL ex 04/08/22 1100   Dressing Appearance Intact;Moist drainage 04/08/22 1100   Drainage Amount Large 04/08/22 1100   Drainage Characteristics/Odor Serosanguineous 04/08/22 1100   Appearance Red 04/08/22 1100   Tissue loss description Full thickness 04/08/22 1100   Red (%), Wound Tissue Color 100 % 04/08/22 1100   Periwound Area Macerated;Moist;Pale white 04/08/22 1100   Wound Edges Open 04/08/22 1100   Wound Length (cm) 1 cm 04/08/22 1100   Wound Width (cm) 1.8 cm 04/08/22 1100   Wound Depth (cm) 0.2 cm 04/08/22 1100   Wound Volume (cm^3) 0.36 cm^3 04/08/22 1100   Wound Surface Area (cm^2) 1.8 cm^2 04/08/22 1100   Care Cleansed with:;Antimicrobial agent;Sterile normal saline;Wound cleanser;Sutures removed 04/08/22 1100   Dressing Changed 04/08/22 1100   Periwound Care Moisture barrier applied 04/08/22 1100   Compression Two layer compression 04/08/22 1100   Off Loading  Football dressing;Off loading shoe 04/08/22 1100   Dressing Change Due 04/11/22 04/08/22 1100           Plan:                NEOX OP REPORT    SURGEON: Marivel Peterson DPM  ASSITANT: None  PRE-OP DX: Ulceration secondary to diabetes mellitus and peripheral neuropathy with sluggish response to prior treatment.  POST-OP DX: same.  ANESTHESIA: Pt. has loss of sensation and therefore no anesthesia was required.  HEMOSTASIS: pressure  EBL: less than 10cc.    Time Out performed to verify patient and site and consent obtained.    Procedure: The patient was seen in the wound clinic room and placed in supine position on the treatment table.  Attention was directed to the ulcer which was located on the left foot, where ulcerations measuring  as noted above.  The ulceration was covered with fibrin and a mild callused rim with periwound maceration.  No acute signs of infection were noted.  The wound is nontender.  Utilizing a 3 mm curette, I debrided 100% of the wound full-thickness through subcutaneous tissue to excise viable and nonviable tissue outside the wound margins.  Patient tolerated well.  The wound was flushed with sterile saline.  There was minimal bleeding with debridement which was well controlled with direct pressure.  A NEOX sheet was then inspected and noted to be in acceptable.  It was then removed sterilely.  The sheet was then fenestrated with the scalpel and then cut and sized and shaped to the wound.  I utilized 80% of the 6x3 cm neox1K with overlapping edges against the wound and discarded the remainder.  The NEOX was adhered down with saline soaked cotton swabs and then secured in place 3-0 prolene and covered with adaptic touch non-adherent layer and application of offloading dressing.  The patient having tolerated the procedure well left the clinic with all vital signs stable and vascular status intact to all digits of both feet.     Short-term goals including promoting granulation and epithelialization  of the wound. Long term goals include maintaining a healed wound with appropriate offloading and good medical management per internist.          Orders Placed This Encounter   Procedures    Change dressing     Clean with NS. Vashe soak x 15 min. Scrub with Iodine for 2 minutes. Cavilon/benzoin to plantar foot and periwound. Neox with sutures, adaptic touch, steristrips per MD. Aquacel cut to fit then Drawtex (Small). Custom fit foam pad to plantar foot (cut per MD). Mextra short x1 secured with medipore tape. Football (cast padding x3). Coflex calamine. CAM boot.    Change at nurse visit, make sure to keep adaptic and steristrips intact.        Follow up in about 3 days (around 4/11/2022) for wound care.

## 2022-04-11 ENCOUNTER — TELEPHONE (OUTPATIENT)
Dept: HEMATOLOGY/ONCOLOGY | Facility: CLINIC | Age: 52
End: 2022-04-11
Payer: MEDICARE

## 2022-04-11 ENCOUNTER — HOSPITAL ENCOUNTER (OUTPATIENT)
Dept: WOUND CARE | Facility: HOSPITAL | Age: 52
Discharge: HOME | End: 2022-04-11
Attending: INTERNAL MEDICINE
Payer: MEDICARE

## 2022-04-11 ENCOUNTER — HOSPITAL ENCOUNTER (OUTPATIENT)
Dept: RADIOLOGY | Facility: HOSPITAL | Age: 52
Discharge: HOME | End: 2022-04-11
Attending: STUDENT IN AN ORGANIZED HEALTH CARE EDUCATION/TRAINING PROGRAM
Payer: MEDICARE

## 2022-04-11 ENCOUNTER — TELEPHONE (OUTPATIENT)
Dept: SURGERY | Facility: CLINIC | Age: 52
End: 2022-04-11
Payer: MEDICARE

## 2022-04-11 DIAGNOSIS — L97.529 DIABETIC ULCER OF LEFT FOOT: Primary | ICD-10-CM

## 2022-04-11 DIAGNOSIS — E11.621 DIABETIC ULCER OF LEFT FOOT: Primary | ICD-10-CM

## 2022-04-11 DIAGNOSIS — C18.9 COLON CANCER: ICD-10-CM

## 2022-04-11 PROCEDURE — 71260 CT CHEST ABDOMEN PELVIS WITH CONTRAST (XPD): ICD-10-PCS | Mod: 26,,, | Performed by: RADIOLOGY

## 2022-04-11 PROCEDURE — 25500020 PHARM REV CODE 255: Performed by: STUDENT IN AN ORGANIZED HEALTH CARE EDUCATION/TRAINING PROGRAM

## 2022-04-11 PROCEDURE — 71260 CT THORAX DX C+: CPT | Mod: TC

## 2022-04-11 PROCEDURE — 74177 CT ABD & PELVIS W/CONTRAST: CPT | Mod: TC

## 2022-04-11 PROCEDURE — 71260 CT THORAX DX C+: CPT | Mod: 26,,, | Performed by: RADIOLOGY

## 2022-04-11 PROCEDURE — 74177 CT CHEST ABDOMEN PELVIS WITH CONTRAST (XPD): ICD-10-PCS | Mod: 26,,, | Performed by: RADIOLOGY

## 2022-04-11 PROCEDURE — 74177 CT ABD & PELVIS W/CONTRAST: CPT | Mod: 26,,, | Performed by: RADIOLOGY

## 2022-04-11 PROCEDURE — 29581 APPL MULTLAYER CMPRN SYS LEG: CPT

## 2022-04-11 RX ADMIN — IOHEXOL 15 ML: 350 INJECTION, SOLUTION INTRAVENOUS at 05:04

## 2022-04-11 RX ADMIN — IOHEXOL 100 ML: 350 INJECTION, SOLUTION INTRAVENOUS at 05:04

## 2022-04-11 NOTE — TELEPHONE ENCOUNTER
----- Message from Erik Stout MD sent at 4/9/2022  8:35 AM CDT -----  Regarding: RE: CT for pt on metformin  Rosi,    Sorry for the late response - I can see him on 4/13    For CT CAP - his Cr is normal, he should get IV and PO contrast    He needs CBC, CMP, CEA before scans anyways so we will have recent lab    Can we put him on during my scope day Wednesday    Erik CASTELLON      ======================  Sheng Haque MD  to Erik Stout MD      That will be fine.   ======================  Erik Stout MD  to Sheng Haque MD · Me      Thank you team     Happy to see him ASAP - always best to have staging done, if he wants me to call him I am happy to do so     WK     ======================  Sheng Haque MD  to Erik Stout MD      It is the distal ASCENDING colon. No further tattoo. Will put an addendum in the colonoscopy report. Thank you for catching the location (most likely a click error).   Thanks   Sheng     ======================    Erik Stout MD  to Me · Sheng Haque MD · MD Rosi Moon, its always best for them to be adequately staged before they see us - CT CAP, CEA - if he would like to be seen before then I can of course see him     There are some discrepancies in the reports for this gentleman - the colonoscopy report from Dr. Haque states that it is in the distal descending colon but the pathology and prior pictures are at the hepatic flexure. Also, I see that a single tattoo was placed at the first colonoscopy but none other - is that correct?     Please feel free to call me to resolve discrepancy     Erik CASTELLON

## 2022-04-11 NOTE — PROGRESS NOTES
[facility] [address] [place]  [address]  Nurse Visit    Subjective:       Patient seen in clinic today.  Dressing changed as ordered.      Assessment:          Wound 10/25/21 1500 Diabetic Ulcer Left plantar;distal Foot (Active)   10/25/21 1500    Pre-existing: Yes   Primary Wound Type: Diabetic ulc   Side: Left   Orientation: plantar;distal   Location: Foot   Wound Number:    Ankle-Brachial Index:    Pulses:    Removal Indication and Assessment:    Wound Outcome: Other   (Retired) Wound Type:    (Retired) Wound Length (cm):    (Retired) Wound Width (cm):    (Retired) Depth (cm):    Wound Description (Comments):    Removal Indications:    Wound WDL ex 04/11/22 0900   Dressing Appearance Intact;Moist drainage 04/11/22 0900   Drainage Amount Moderate 04/11/22 0900   Drainage Characteristics/Odor Serosanguineous 04/11/22 0900   Appearance Dressing in place, unable to visualize 04/11/22 0900   Care Cleansed with:;Soap and water;Sterile normal saline 04/11/22 0900   Dressing Changed 04/11/22 0900   Periwound Care Skin barrier film applied 04/11/22 0900   Compression Two layer compression 04/11/22 0900   Off Loading Football dressing;Off loading shoe 04/11/22 0900            Wound 04/08/22 1137 Diabetic Ulcer Left plantar;medial Foot (Active)   04/08/22 1137    Pre-existing: Yes   Primary Wound Type: Diabetic ulc   Side: Left   Orientation: plantar;medial   Location: Foot   Wound Number:    Ankle-Brachial Index:    Pulses:    Removal Indication and Assessment:    Wound Outcome:    (Retired) Wound Type:    (Retired) Wound Length (cm):    (Retired) Wound Width (cm):    (Retired) Depth (cm):    Wound Description (Comments):    Removal Indications:    Wound WDL ex 04/11/22 0900   Dressing Appearance Intact;Moist drainage 04/11/22 0900   Drainage Amount Moderate 04/11/22 0900   Drainage Characteristics/Odor Serosanguineous 04/11/22 0900   Appearance Dressing in place, unable to visualize  04/11/22 0900   Care Cleansed with:;Soap and water;Sterile normal saline 04/11/22 0900   Periwound Care Moisture barrier applied 04/11/22 0900   Compression Two layer compression 04/11/22 0900   Off Loading Football dressing 04/11/22 0900           Plan:     No orders of the defined types were placed in this encounter.          No follow-ups on file.

## 2022-04-11 NOTE — TELEPHONE ENCOUNTER
Called pt in regards to labs and a CT requested by Dr. Stout. CT and LABS are scheduled for later today and appointment will be made for the pt to see Dr. stout on 4/13. All appointments confirmed with pt.

## 2022-04-12 ENCOUNTER — TELEPHONE (OUTPATIENT)
Dept: SURGERY | Facility: CLINIC | Age: 52
End: 2022-04-12
Payer: MEDICARE

## 2022-04-12 PROBLEM — C18.9 COLON ADENOCARCINOMA: Status: ACTIVE | Noted: 2022-04-12

## 2022-04-12 NOTE — PROGRESS NOTES
[facility]  Colon and Rectal Surgery    [address]  [place], [place]  Tel: [phone]  Fax: [phone]  [email or web address]   MD Fadi Chapman MD Brian Kann, MD W. Forrest Johnston, MD Matthew Giglia, MD Jennifer Paruch, MD William Kethman, MD Danielle Kay, MD     Patient name: Indio Ryder Jr.   YOB: 1970   MRN: [id number]  Date of visit: 04/13/2022    Dear Navid Thakur and Garland,    It was a pleasure seeing Mr. Ryder in the Colon and Rectal Surgery clinic here at [facility].     As you know, Mr. Ryder is a 51 year old man with a history of DM and HLD who presents for evaluation of colon adenocarcinoma. He underwent a colonoscopy on 1/19/2022 with Dr. Haile and was found to have a sessile hepatic flexure polyp (single tattoo at that time) > referred to Dr. Haque for EMR (3/21/2022 - initial colonoscopy report was mislabeled, addendum made) - removed in piecemeal fashion but was described as complete resection. Of note, he carries a diagnosis of UC but he said that this was made over a decade ago when he was taking NSAIDs frequently - he has not had any issues or been treated for this in over a decade. He is asymptomatic, and denies any family history of CRC (sister had breast cancer). He is being treated for a lower extremity ulcer - last HgbA1c 8.1 9/2021.    Oncology History   Colon adenocarcinoma   3/30/2022 Pathology Significant Finding    COLON, HEPATIC FLEXURE POLYP, EMR:   -Invasive adenocarcinoma, moderately differentiated, arising in a tubular   adenoma.   TUMOR SITE:  Hepatic flexure.   HISTOLOGIC TYPE:  Adenocarcinoma.   HISTOLOGIC GRADE:  G2 moderately differentiated.   SIZE OF INVASIVE TUMOR:  At least 0.3 mm.   TUMOR EXTENT:  Invades the submucosa.   LYMPHOVASCULAR INVASION:  Not identified.   POLYP SIZE:  Greatest dimensions:  1.4 cm, see comment.   MARGINS:  Margins are positive for high grade dysplasia.                         No carcinoma is identified at cauterized margins.   COMMENT:   It is difficult measure the exact size of the invasive tumor and polyp   configuration secondary to the fragmented nature of the specimen and   tangential sectioning.      3/30/2022 Initial Diagnosis    Colon adenocarcinoma     4/11/2022 Imaging Significant Findings    CT CAP  1. In this patient with recent diagnosis of colon cancer, there is no convincing metastatic disease.  Presumed hemostasis clips right colon may be the site of the known colon cancer.  2. 2 mm nodule left upper lobe series 6, image 222.  Attention on follow-up.  For a solid nodule <6 mm, Fleischner Society 2017 guidelines recommend no routine follow up for a low risk patient, or follow-up with non-contrast chest CT at 12 months in a high risk patient.  3. Cholelithiasis without evidence of cholecystitis.  4. Hepatomegaly.  5. Small-sized hiatal hernia with reflux.  6. Borderline enlarged left paratracheal node.     4/11/2022 Tumor Markers    Patient's tumor was tested for the following markers: CEA.                                              Results of the tumor marker test revealed 4       The patient was informed of the availability of a certified  without charge. A certified  was not necessary for this visit.    Review of Systems  See pertinent review of systems above    Past Medical History:   Diagnosis Date    Actinomyces infection 01/17/2017    Right foot    Colon adenocarcinoma 04/12/2022    Diabetic ketoacidosis without coma associated with type 2 diabetes mellitus 05/30/2017    Diabetic ulcer of right foot associated with type 2 diabetes mellitus 06/03/2015    Disorder of kidney and ureter     Essential hypertension 06/06/2013    Group B streptococcal infection 01/13/2017    Mixed hyperlipidemia 08/12/2014    Septic arthritis of left foot 04/28/2021    Shoulder impingement 08/12/2014    Subacute osteomyelitis of  right foot 01/12/2017    Streptococcus agalactiae, Actinomyces odontolyticus    Traumatic amputation of fifth toe of right foot 07/02/2015    Type 2 diabetes mellitus with diabetic neuropathy, with long-term current use of insulin 05/03/2016     Past Surgical History:   Procedure Laterality Date    COLONOSCOPY Right 1/19/2022    Procedure: COLONOSCOPY;  Surgeon: Tj Haile MD;  Location: [facility] (4TH FLR);  Service: Endoscopy;  Laterality: Right;  previous order un-usable/poor prep 1/18/22  RAPID COVID test arrival 12:20  instructions handed to pt- sm    COLONOSCOPY N/A 3/21/2022    Procedure: COLONOSCOPY;  Surgeon: Sheng Haque MD;  Location: [facility] END[facility] (2ND FLR);  Service: Endoscopy;  Laterality: N/A;  Colonoscopy with AES for hepatix flexure polyp removal, 2nd floor  Pt is fully vaccinated-DS  Pt prescribed Plavix by Dr. Stahl in Jan. 2022, however pt states that he never started taking it-DS  3/16/22-Instructions sent via portal-DS    DEBRIDEMENT OF FOOT Left 7/14/2019    Procedure: DEBRIDEMENT, FOOT, with left 5th ray amputation;  Surgeon: Sis Hickman DPM;  Location: [facility] OR 2ND FLR;  Service: Podiatry;  Laterality: Left;    DEBRIDEMENT OF FOOT Left 7/17/2019    Procedure: DEBRIDEMENT, FOOT with leftt 5th ray partial amputation with Neox Graft;  Surgeon: Mai Burrell DPM;  Location: [facility] OR 2ND FLR;  Service: Podiatry;  Laterality: Left;  t    DEBRIDEMENT OF FOOT Bilateral 4/29/2021    Procedure: DEBRIDEMENT, FOOT;  Surgeon: Mai Burrell DPM;  Location: [facility] OR 1ST FLR;  Service: Podiatry;  Laterality: Bilateral;  Graft application    TOE AMPUTATION Right 06/05/2015    TOE AMPUTATION Right 01/19/2017     Family History   Adopted: Yes   Problem Relation Age of Onset    Hypertension Mother     Cancer Mother         breast    Diabetes Mother     Hypertension Father     Cataracts Father     Heart disease Father         mi    Diabetes Sister     No Known Problems  Brother     Heart disease Sister         MI    No Known Problems Sister     Hypertension Maternal Aunt     No Known Problems Maternal Uncle     No Known Problems Paternal Aunt     No Known Problems Paternal Uncle     No Known Problems Maternal Grandmother     No Known Problems Maternal Grandfather     No Known Problems Paternal Grandmother     No Known Problems Paternal Grandfather     Amblyopia Neg Hx     Blindness Neg Hx     Glaucoma Neg Hx     Macular degeneration Neg Hx     Retinal detachment Neg Hx     Strabismus Neg Hx     Stroke Neg Hx     Thyroid disease Neg Hx      Social History     Tobacco Use    Smoking status: Never Smoker    Smokeless tobacco: Never Used   Substance Use Topics    Alcohol use: No    Drug use: No     Review of patient's allergies indicates:  No Known Allergies    Current Outpatient Medications on File Prior to Visit   Medication Sig Dispense Refill    acetaminophen (TYLENOL) 325 MG tablet Take 2 tablets (650 mg total) by mouth every 6 (six) hours as needed. 30 tablet 0    aspirin 81 MG Chew Chew and swallow 1 tablet (81 mg total) by mouth once daily. 90 tablet 3    dulaglutide (TRULICITY) 1.5 mg/0.5 mL pen injector Inject 1.5 mg into the skin every 7 days. 4 pen 5    flu vacc [id number] 6mos up,PF, 60 mcg (15 mcg x 4)/0.5 mL Syrg inject IM by [facility] 0.5 mL 0    insulin aspart U-100 (NOVOLOG) 100 unit/mL (3 mL) InPn pen Inject 8 units before meals plus scale 150-200+2, 201-250+4, 251-300+6, 301-350+8, >350+10. Max daily 54 units. 15 mL 6    insulin detemir U-100 (LEVEMIR FLEXTOUCH) 100 unit/mL (3 mL) SubQ InPn pen Inject 24 Units into the skin every evening. 15 mL 6    lisinopriL (PRINIVIL,ZESTRIL) 20 MG tablet Take 1 tablet (20 mg total) by mouth once daily. 90 tablet 3    metFORMIN (GLUCOPHAGE) 500 MG tablet Take 2 tablets by mouth in the morning, and take 2 tablets by mouth at night. (Patient taking differently: Take 2 tablets by mouth in the morning, and take  "2 tablets by mouth at night.) 360 tablet 3    ONETOUCH DELICA LANCETS 33 gauge Misc       ONETOUCH ULTRA2 kit       pen needle, diabetic (BD SASCHA 2ND GEN PEN NEEDLE) 32 gauge x 5/32" Ndle Use 4 times a day (Patient taking differently: Use 4 times a day) 400 each 3    permethrin (ELIMITE) 5 % cream Apply neck down at night for 1 application. Wash off in morning and Repeat in 1 week (Patient not taking: No sig reported) 60 g 1    [DISCONTINUED] atorvastatin (LIPITOR) 80 MG tablet Take 1 tablet (80 mg total) by mouth once daily. (Patient not taking: No sig reported) 90 tablet 0    [DISCONTINUED] clopidogreL (PLAVIX) 75 mg tablet Take 1 tablet (75 mg total) by mouth once daily. (Patient not taking: No sig reported) 30 tablet 11     Current Facility-Administered Medications on File Prior to Visit   Medication Dose Route Frequency Provider Last Rate Last Admin    heparin, porcine (PF) 100 unit/mL injection flush 10 Units  10 Units Intravenous PRN Esthela Diggs MD   500 Units at 05/19/21 1308     Physical Examination  There were no vitals taken for this visit.     A chaperone was present for the physical examination.    Constitutional: well developed, no cough, no dyspnea, alert, and no acute distress    Head: Normocephalic, no lesions, without obvious abnormality  Eye: Normal external eye, conjunctiva, and lids  Cardiovascular: regular rate and regular rhythm  Respiratory: normal air entry  Gastrointestinal: soft, non-tender  Musculoskeletal: full range of motion without pain  Neurologic: alert, oriented, normal speech, no focal findings or movement disorder noted  Psychiatric: appropriate, normal mood    Assessment and Plan of Care    Thank you again for referring Mr. Ryder to my care. In summary, Mr. Ryder is a 51 year old man presenting with colon adenocarcinoma arising from sessile polyp removed in piecemeal fashion, margins positive for high-grade dysplasia. We discussed treatment options and have " provided the following recommendations:    1. Add-on for pathology review next week prior to proceeding with surgery  2. The patient and I have discussed the indications for their surgery, including the development and multimodal treatment of colon cancer. We discussed the role for segmental resection to remove the primary tumor and lymph nodes - this allows complete staging of their disease. He understands that based on final pathology, additional treatment, or chemotherapy, may be beneficial. The expected hospital course and recovery has been discussed, as well as the potential risks, including: Bleeding, risk of blood transfusion, infection, hernia formation, need for additional procedure, ileus, anastomotic leak, need for reoperation, injury to nearby structures, including bladder or ureters, permanent or temporary stoma creation. They had the opportunity to ask questions - consent was signed. We will tentatively plan for robotic vs laparoscopic right hemicolectomy on 4/25/2022 pending review of pathology - I will call him to to confirm or cancel pending discussion.    Please do not hesitate to contact me if you have any questions.      Erik Stout MD - Staff Surgeon  Department of Colon & Rectal Surgery  [facility]

## 2022-04-13 ENCOUNTER — PATIENT OUTREACH (OUTPATIENT)
Dept: ADMINISTRATIVE | Facility: OTHER | Age: 52
End: 2022-04-13
Payer: MEDICARE

## 2022-04-13 ENCOUNTER — OFFICE VISIT (OUTPATIENT)
Dept: SURGERY | Facility: CLINIC | Age: 52
End: 2022-04-13
Payer: MEDICARE

## 2022-04-13 VITALS
WEIGHT: 231.25 LBS | BODY MASS INDEX: 30.65 KG/M2 | HEIGHT: 73 IN | SYSTOLIC BLOOD PRESSURE: 161 MMHG | DIASTOLIC BLOOD PRESSURE: 95 MMHG | HEART RATE: 86 BPM

## 2022-04-13 DIAGNOSIS — Z41.9 SURGERY, ELECTIVE: ICD-10-CM

## 2022-04-13 DIAGNOSIS — C18.9 COLON ADENOCARCINOMA: ICD-10-CM

## 2022-04-13 DIAGNOSIS — Z41.9 SURGERY, ELECTIVE: Primary | ICD-10-CM

## 2022-04-13 PROCEDURE — 99999 PR PBB SHADOW E&M-EST. PATIENT-LVL II: CPT | Mod: PBBFAC,,, | Performed by: STUDENT IN AN ORGANIZED HEALTH CARE EDUCATION/TRAINING PROGRAM

## 2022-04-13 PROCEDURE — 99205 PR OFFICE/OUTPT VISIT, NEW, LEVL V, 60-74 MIN: ICD-10-PCS | Mod: S$PBB,,, | Performed by: STUDENT IN AN ORGANIZED HEALTH CARE EDUCATION/TRAINING PROGRAM

## 2022-04-13 PROCEDURE — 99212 OFFICE O/P EST SF 10 MIN: CPT | Mod: PBBFAC | Performed by: STUDENT IN AN ORGANIZED HEALTH CARE EDUCATION/TRAINING PROGRAM

## 2022-04-13 PROCEDURE — 99999 PR PBB SHADOW E&M-EST. PATIENT-LVL II: ICD-10-PCS | Mod: PBBFAC,,, | Performed by: STUDENT IN AN ORGANIZED HEALTH CARE EDUCATION/TRAINING PROGRAM

## 2022-04-13 PROCEDURE — 99205 OFFICE O/P NEW HI 60 MIN: CPT | Mod: S$PBB,,, | Performed by: STUDENT IN AN ORGANIZED HEALTH CARE EDUCATION/TRAINING PROGRAM

## 2022-04-13 RX ORDER — METRONIDAZOLE 500 MG/1
TABLET ORAL
Qty: 3 TABLET | Refills: 0 | Status: SHIPPED | OUTPATIENT
Start: 2022-04-13 | End: 2022-06-06

## 2022-04-13 RX ORDER — SYRING-NEEDL,DISP,INSUL,0.3 ML 29 G X1/2"
296 SYRINGE, EMPTY DISPOSABLE MISCELLANEOUS ONCE
Qty: 296 ML | Refills: 0 | Status: SHIPPED | OUTPATIENT
Start: 2022-04-13 | End: 2022-04-13

## 2022-04-13 RX ORDER — POLYETHYLENE GLYCOL 3350 17 G/17G
POWDER, FOR SOLUTION ORAL
Qty: 1020 G | Refills: 0 | Status: SHIPPED | OUTPATIENT
Start: 2022-04-13 | End: 2022-06-06

## 2022-04-13 RX ORDER — NEOMYCIN SULFATE 500 MG/1
TABLET ORAL
Qty: 6 TABLET | Refills: 0 | Status: SHIPPED | OUTPATIENT
Start: 2022-04-13 | End: 2022-06-06

## 2022-04-14 ENCOUNTER — HOSPITAL ENCOUNTER (OUTPATIENT)
Dept: WOUND CARE | Facility: HOSPITAL | Age: 52
Discharge: HOME | End: 2022-04-14
Attending: FAMILY MEDICINE
Payer: MEDICARE

## 2022-04-14 ENCOUNTER — HOSPITAL ENCOUNTER (OUTPATIENT)
Dept: CARDIOLOGY | Facility: CLINIC | Age: 52
Discharge: HOME | End: 2022-04-14
Payer: MEDICARE

## 2022-04-14 VITALS
RESPIRATION RATE: 16 BRPM | DIASTOLIC BLOOD PRESSURE: 86 MMHG | SYSTOLIC BLOOD PRESSURE: 149 MMHG | TEMPERATURE: 98 F | HEART RATE: 89 BPM

## 2022-04-14 DIAGNOSIS — Z41.9 SURGERY, ELECTIVE: ICD-10-CM

## 2022-04-14 PROCEDURE — 93005 ELECTROCARDIOGRAM TRACING: CPT | Mod: PBBFAC | Performed by: INTERNAL MEDICINE

## 2022-04-14 PROCEDURE — 93010 ELECTROCARDIOGRAM REPORT: CPT | Mod: S$PBB,,, | Performed by: INTERNAL MEDICINE

## 2022-04-14 PROCEDURE — 93010 EKG 12-LEAD: ICD-10-PCS | Mod: S$PBB,,, | Performed by: INTERNAL MEDICINE

## 2022-04-14 NOTE — PROGRESS NOTES
[facility]  [place]  [address]  Nurse Visit    Subjective:     Pt arrived to [facility] ambulated without any issues. Pt denies any fever, chills, or flu-like symptoms; denies pain/discomfort. Pt reports not having any issues with drsg this past week. Upon removal of drsg the adaptic touch and steristips were adhered to the custom foam pad despite attempts made by RN to only remove foam pad carefullly. No evidence of neox adhered to adaptic. 6 sutures total remain to plantar foot. Benzoin applied to periwound, and reapplied adaptic touch over wound bed, steristrips applied to borders of adaptic. Remaining secondary drsg applied as ordered.       Assessment:          Wound 10/25/21 1500 Diabetic Ulcer Left plantar;distal Foot (Active)   10/25/21 1500    Pre-existing: Yes   Primary Wound Type: Diabetic [facility]   Side: Left   Orientation: plantar;distal   Location: Foot   Wound Number:    Ankle-Brachial Index:    Pulses:    Removal Indication and Assessment:    Wound Outcome: Other   (Retired) Wound Type:    (Retired) Wound Length (cm):    (Retired) Wound Width (cm):    (Retired) Depth (cm):    Wound Description (Comments):    Removal Indications:    Wound Image   04/14/22 0800   Wound WDL ex 04/14/22 0800   Dressing Appearance Dry;Intact;Moist drainage 04/14/22 0800   Drainage Amount Moderate 04/14/22 0800   Drainage Characteristics/Odor Serosanguineous 04/14/22 0800   Tissue loss description Full thickness 04/14/22 0800   Black (%), Wound Tissue Color 0 % 04/14/22 0800   Red (%), Wound Tissue Color 100 % 04/14/22 0800   Yellow (%), Wound Tissue Color 0 % 04/14/22 0800   Periwound Area Macerated 04/14/22 0800   Wound Edges Open 04/14/22 0800   Wound Length (cm) 0.6 cm 04/14/22 0800   Wound Width (cm) 1 cm 04/14/22 0800   Wound Depth (cm) 0.2 cm 04/14/22 0800   Wound Volume (cm^3) 0.12 cm^3 04/14/22 0800   Wound Surface Area (cm^2) 0.6 cm^2 04/14/22 0800   Tunneling (depth  (cm)/location) 0 04/14/22 0800   Undermining (depth (cm)/location) 0 04/14/22 0800   Care Cleansed with:;Sterile normal saline 04/14/22 0800   Dressing Applied 04/14/22 0800   Periwound Care Skin barrier film applied 04/14/22 0800   Compression Two layer compression 04/14/22 0800   Off Loading Football dressing;Other (see comments) 04/14/22 0800            Wound 04/08/22 1137 Diabetic Ulcer Left plantar;medial Foot (Active)   04/08/22 1137    Pre-existing: Yes   Primary Wound Type: Diabetic ulc   Side: Left   Orientation: plantar;medial   Location: Foot   Wound Number:    Ankle-Brachial Index:    Pulses:    Removal Indication and Assessment:    Wound Outcome:    (Retired) Wound Type:    (Retired) Wound Length (cm):    (Retired) Wound Width (cm):    (Retired) Depth (cm):    Wound Description (Comments):    Removal Indications:    Wound Image   04/14/22 0800   Wound WDL ex 04/14/22 0800   Dressing Appearance Dry;Intact;Moist drainage 04/14/22 0800   Drainage Amount Moderate 04/14/22 0800   Drainage Characteristics/Odor Serosanguineous 04/14/22 0800   Tissue loss description Full thickness 04/14/22 0800   Black (%), Wound Tissue Color 0 % 04/14/22 0800   Red (%), Wound Tissue Color 100 % 04/14/22 0800   Yellow (%), Wound Tissue Color 0 % 04/14/22 0800   Periwound Area Macerated 04/14/22 0800   Wound Edges Open 04/14/22 0800   Wound Length (cm) 1 cm 04/14/22 0800   Wound Width (cm) 1.5 cm 04/14/22 0800   Wound Depth (cm) 0.2 cm 04/14/22 0800   Wound Volume (cm^3) 0.3 cm^3 04/14/22 0800   Wound Surface Area (cm^2) 1.5 cm^2 04/14/22 0800   Tunneling (depth (cm)/location) 0 04/14/22 0800   Undermining (depth (cm)/location) 0 04/14/22 0800   Care Cleansed with:;Sterile normal saline 04/14/22 0800   Dressing Applied 04/14/22 0800   Periwound Care Skin barrier film applied 04/14/22 0800   Compression Two layer compression 04/14/22 0800   Off Loading Football dressing;Other (see comments) 04/14/22 0800           Plan:     No  orders of the defined types were placed in this encounter.          No follow-ups on file.

## 2022-04-20 ENCOUNTER — TELEPHONE (OUTPATIENT)
Dept: SURGERY | Facility: CLINIC | Age: 52
End: 2022-04-20
Payer: MEDICARE

## 2022-04-20 ENCOUNTER — DOCUMENTATION ONLY (OUTPATIENT)
Dept: SURGERY | Facility: CLINIC | Age: 52
End: 2022-04-20
Payer: MEDICARE

## 2022-04-20 NOTE — PROGRESS NOTES
[facility]  Colon, Rectal and Anal Malignancies  Multi-disciplinary Tumor Board     [address]  [place], [place]  Tel: [phone]  Fax: [phone]  [email or web address]     Patient name: Indio Ryder Jr.   YOB: 1970   MRN: [id number]    Date of discussion: 04/20/2022    Thank you for referring Mr. Ryder to our care here at [facility]. Please allow us to summarize our review of records and recommendations.    The following disciplines were present for the discussion:    Colon and Rectal Surgery   Medical Oncology   Radiation Oncology   Pathology   Radiology    Endoscopy reviewed:    Date performed: 1/19/2022   Yes, complete evaluation of colon and rectum performed    CT Chest, Abdomen and Pelvis    Date performed: 4/11/2022    Performed at our facility: Yes   Metastatic disease present: No    MRI Rectal Cancer Protocol   Date performed: Not performed    Performed at our facility: Not performed   Tumor location in the rectum: Not performed   Sphincter involvement: Not performed   Pretreatment circumferential resection margin status: Not performed    Pathology reviewed:    Date pathology finalized: 3/30/2022   Yes, slides reviewed     Pre-treatment CEA:   Date performed: 4/11/2022   CEA Level: 4.0    Pre-treatment Clinical Stage:   T1 - tumor invades the submucosa   NX - not assessed   M0 - No metastasis suspected    Based on our review of the current information we have made the following recommendations:  Summary: 51M with hepatic flexure sessile polyp s/p EMR with piecemeal resection and pathology demonstrating adenocarcinoma invading into the submucosa. Plan for surgical resection.     Additional laboratory, imaging, or endoscopic studies   none    Therapies   Surgical resection - Right colectomy    Clinical research study eligibility - No    Referrals   none    Please do not hesitate to contact us for any questions.

## 2022-04-21 ENCOUNTER — PATIENT MESSAGE (OUTPATIENT)
Dept: SURGERY | Facility: HOSPITAL | Age: 52
End: 2022-04-21
Payer: MEDICARE

## 2022-04-22 ENCOUNTER — TELEPHONE (OUTPATIENT)
Dept: SURGERY | Facility: CLINIC | Age: 52
End: 2022-04-22
Payer: MEDICARE

## 2022-04-22 ENCOUNTER — HOSPITAL ENCOUNTER (OUTPATIENT)
Dept: WOUND CARE | Facility: HOSPITAL | Age: 52
Discharge: HOME | End: 2022-04-22
Attending: PODIATRIST
Payer: MEDICARE

## 2022-04-22 DIAGNOSIS — L97.422 DIABETIC ULCER OF LEFT MIDFOOT ASSOCIATED WITH TYPE 2 DIABETES MELLITUS, WITH FAT LAYER EXPOSED: Primary | ICD-10-CM

## 2022-04-22 DIAGNOSIS — L97.529 DIABETIC ULCER OF LEFT FOOT: ICD-10-CM

## 2022-04-22 DIAGNOSIS — E11.621 DIABETIC ULCER OF LEFT FOOT: ICD-10-CM

## 2022-04-22 DIAGNOSIS — E11.621 DIABETIC ULCER OF LEFT MIDFOOT ASSOCIATED WITH TYPE 2 DIABETES MELLITUS, WITH FAT LAYER EXPOSED: Primary | ICD-10-CM

## 2022-04-22 PROCEDURE — 11042 DBRDMT SUBQ TIS 1ST 20SQCM/<: CPT | Performed by: PODIATRIST

## 2022-04-22 PROCEDURE — 27201912 HC WOUND CARE DEBRIDEMENT SUPPLIES: Performed by: PODIATRIST

## 2022-04-22 PROCEDURE — 99214 PR OFFICE/OUTPT VISIT, EST, LEVL IV, 30-39 MIN: ICD-10-PCS | Mod: ,,, | Performed by: PODIATRIST

## 2022-04-22 PROCEDURE — 29581 APPL MULTLAYER CMPRN SYS LEG: CPT | Mod: LT

## 2022-04-22 PROCEDURE — 99214 OFFICE O/P EST MOD 30 MIN: CPT | Mod: ,,, | Performed by: PODIATRIST

## 2022-04-22 NOTE — PROGRESS NOTES
[facility]                Progress Note    Subjective:       Patient ID: Indio Ryder Jr. is a 51 y.o. male.    Chief Complaint: Wound Check    F/u wound care visit. Patient ambulatory to exam room with no c/o pain at present. Cam boot on as ordered. Wound dressing to LLE intact with moderate amount of drainage noted.     Patient relates that he has a surgical procedure scheduled on Monday and will likely be admitted in the hospital for a few days.  He relates on discharge he will temporarily live with his sister in [place] to assist in his post op care.      Review of Systems   Constitutional: Negative for activity change, appetite change, chills, fatigue and fever.   HENT: Negative for hearing loss.    Eyes: Negative for photophobia and visual disturbance.   Respiratory: Negative for cough, chest tightness, shortness of breath and wheezing.    Cardiovascular: Positive for leg swelling. Negative for chest pain and palpitations.   Gastrointestinal: Negative for constipation, diarrhea, nausea and vomiting.   Endocrine: Negative for cold intolerance and heat intolerance.   Genitourinary: Negative for flank pain.   Musculoskeletal: Positive for gait problem and myalgias. Negative for neck pain and neck stiffness.   Skin: Positive for wound. Negative for color change.   Neurological: Positive for numbness. Negative for weakness, light-headedness and headaches.        + paresthesia    Psychiatric/Behavioral: Negative for sleep disturbance.         Objective:        Physical Exam  Vitals reviewed.   Constitutional:       Appearance: He is well-developed.   Cardiovascular:      Comments: dorsalis pedis and posterior tibial pulses are palpable bilaterally. Capillary refill time is within normal limits. + pedal hair growth         Musculoskeletal:         General: No tenderness. Normal range of motion.      Comments: Normal angle, base, station of gait. All toes  without clubbing, cyanosis, or signs of ischemia.  No pain to palpation bilateral lower extremities.  Range of motion, stability, muscle strength, and muscle tone normal bilateral feet and legs.    Skin:     General: Skin is warm and dry.      Coloration: Skin is not ashen or cyanotic.      Findings: Wound (see below) present. No rash.      Nails: There is no clubbing.   Neurological:      Mental Status: He is alert and oriented to person, place, and time.      Sensory: No sensory deficit.      Comments: Diminished/loss of protective sensation all toes bilateral to 10 gram monofilament.     Psychiatric:         Behavior: Behavior normal.         There were no vitals filed for this visit.    Assessment:           ICD-10-CM ICD-9-CM   1. Diabetic ulcer of left midfoot associated with type 2 diabetes mellitus, with fat layer exposed  E11.621 250.80    L97.422 707.14   2. Diabetic ulcer of left foot  E11.621 250.80    L97.529 707.15            Wound 10/25/21 1500 Diabetic Ulcer Left plantar;distal Foot (Active)   10/25/21 1500    Pre-existing: Yes   Primary Wound Type: Diabetic ulc   Side: Left   Orientation: plantar;distal   Location: Foot   Wound Number:    Ankle-Brachial Index:    Pulses:    Removal Indication and Assessment:    Wound Outcome: Other   (Retired) Wound Type:    (Retired) Wound Length (cm):    (Retired) Wound Width (cm):    (Retired) Depth (cm):    Wound Description (Comments):    Removal Indications:    Wound Image   04/22/22 1100   Wound WDL ex 04/22/22 1100   Dressing Appearance Intact;Moist drainage 04/22/22 1100   Drainage Amount Moderate 04/22/22 1100   Drainage Characteristics/Odor Serosanguineous 04/22/22 1100   Appearance Red 04/22/22 1100   Tissue loss description Full thickness 04/22/22 1100   Black (%), Wound Tissue Color 0 % 04/22/22 1100   Red (%), Wound Tissue Color 100 % 04/22/22 1100   Yellow (%), Wound Tissue Color 0 % 04/22/22 1100   Periwound Area Macerated 04/22/22 1100   Wound  Edges Defined 04/22/22 1100   Wound Length (cm) 0.5 cm 04/22/22 1100   Wound Width (cm) 0.6 cm 04/22/22 1100   Wound Depth (cm) 0.2 cm 04/22/22 1100   Wound Volume (cm^3) 0.06 cm^3 04/22/22 1100   Wound Surface Area (cm^2) 0.3 cm^2 04/22/22 1100   Care Cleansed with:;Antimicrobial agent;Soap and water;Sterile normal saline 04/22/22 1100   Dressing Changed 04/22/22 1100   Periwound Care Moisture barrier applied 04/22/22 1100   Compression Two layer compression 04/22/22 1100   Off Loading Football dressing;Off loading shoe 04/22/22 1100   Dressing Change Due 04/29/22 04/22/22 1100            Wound 04/08/22 1137 Diabetic Ulcer Left plantar;medial Foot (Active)   04/08/22 1137    Pre-existing: Yes   Primary Wound Type: Diabetic ulc   Side: Left   Orientation: plantar;medial   Location: Foot   Wound Number:    Ankle-Brachial Index:    Pulses:    Removal Indication and Assessment:    Wound Outcome:    (Retired) Wound Type:    (Retired) Wound Length (cm):    (Retired) Wound Width (cm):    (Retired) Depth (cm):    Wound Description (Comments):    Removal Indications:    Wound Image    04/22/22 1100   Wound WDL ex 04/22/22 1100   Dressing Appearance Intact;Moist drainage 04/22/22 1100   Drainage Amount Moderate 04/22/22 1100   Drainage Characteristics/Odor Serosanguineous 04/22/22 1100   Appearance Red 04/22/22 1100   Tissue loss description Full thickness 04/22/22 1100   Black (%), Wound Tissue Color 0 % 04/22/22 1100   Red (%), Wound Tissue Color 100 % 04/22/22 1100   Yellow (%), Wound Tissue Color 0 % 04/22/22 1100   Periwound Area Macerated;Pale white 04/22/22 1100   Wound Edges Defined 04/22/22 1100   Wound Length (cm) 0.9 cm 04/22/22 1100   Wound Width (cm) 0.4 cm 04/22/22 1100   Wound Depth (cm) 0.3 cm 04/22/22 1100   Wound Volume (cm^3) 0.108 cm^3 04/22/22 1100   Wound Surface Area (cm^2) 0.36 cm^2 04/22/22 1100   Care Cleansed with:;Soap and water;Sterile normal saline 04/22/22 1100   Dressing Changed 04/22/22 1100    Periwound Care Moisture barrier applied 04/22/22 1100   Compression Two layer compression 04/22/22 1100   Off Loading Football dressing;Off loading shoe 04/22/22 1100   Dressing Change Due 04/29/22 04/22/22 1100           Plan:            Sutures removed    Wounds to left plantar foot improving, measuring smaller. Patient encouraged to keep LLE elevated as much as possible.     Plan for neox on next encounter    Wound care done per order.          Orders Placed This Encounter   Procedures    Change dressing     Clean with vashe. Gentian violet to periwound. [place] to wound bed. Drawtex cut to fit. Custom fit foam pad over plantar foot with small openings over wound to allow drainage. Mextra long x1 secured with medipore tape. Football dressing (cast padding x3). Coflex calamine (make sure calamine is on skin prior to applying cast padding). RTC in one week.        Follow up in about 1 week (around 4/29/2022) for wound care visit.        Abdomen soft, non-tender, no guarding.

## 2022-04-22 NOTE — TELEPHONE ENCOUNTER
Called and discussed surgery with him, all questions answered      Erik Stout MD - Staff Surgeon  Department of Colon & Rectal Surgery  [facility]

## 2022-04-22 NOTE — PRE-PROCEDURE INSTRUCTIONS
Preoperative NPO and Bowel Prep instructions given per CRS Dept. Patient's questions answered and patient V/C/U.    PREOP INSTRUCTIONS:    Shower instructions as well as directions to the Surgery Center were given.Encouraged to wear loose fitting,comfortable clothing.Medication instructions for pm prior to and am of procedure reviewed.Instructed to avoid taking vitamins,supplements,aspirin and ibuprofen the morning of surgery.     Patient advised of the updated visitor policy allowing one adult support person,pre and post procedure.     Patient denies any side effects or issues with anesthesia or sedation.      Patient GIVEN ARRIVAL TIME OF 0930.  LUCA CESPEDES

## 2022-04-22 NOTE — TELEPHONE ENCOUNTER
Left voicemail with callback number to confirm 0930 arrival time for surgery. Instructed to call back to confirm or send message through portal.

## 2022-04-23 ENCOUNTER — ANESTHESIA EVENT (OUTPATIENT)
Dept: SURGERY | Facility: HOSPITAL | Age: 52
DRG: 330 | End: 2022-04-23
Payer: MEDICARE

## 2022-04-23 NOTE — ANESTHESIA PREPROCEDURE EVALUATION
[facility]  Anesthesia Pre-Operative Evaluation         Patient Name: Indio Ryder Jr.  YOB: 1970  MRN: [id number]    SUBJECTIVE:     Pre-operative evaluation for Procedure(s) (LRB):  XI ROBOTIC COLECTOMY, RIGHT (lithotomy, eras low) (N/A)  COLONOSCOPY (N/A)     04/23/2022    Indio Ryder Jr. is a 51 y.o. male w/ a significant PMHx of HTN, DM, PVD, and adenocarcinoma of the colon at the hepatic flexure. Patient has chronic L foot wound, currently being seen by podiatry.     Stress test 2/28 shows EF 50% and normal myocardial perfusion with no chest pain or arrhythmias.     Patient now presents for the above procedure(s).      LDA: None documented.     Prev airway: None documented.    Drips: None documented.      Patient Active Problem List   Diagnosis    Essential hypertension    Mixed hyperlipidemia    Traumatic amputation of fifth toe of right foot    Type 2 diabetes mellitus with hyperglycemia, with long-term current use of insulin    Actinomyces infection    Hypokalemia    Neck pain    Diabetic ulcer of right midfoot associated with diabetes mellitus due to underlying condition, with fat layer exposed    Severe malnutrition    Hypertensive retinopathy, bilateral    Allergic reaction due to antibacterial drug    Chronic left shoulder pain    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 edema, with long-term current use of insulin    Diabetic ulcer of left foot    Septic arthritis of left foot    Acute on chronic osteomyelitis    Proteus infection    Colon adenocarcinoma       Review of patient's allergies indicates:  No Known Allergies    Current Inpatient Medications:    Current Facility-Administered Medications:     heparin, porcine (PF) 100 unit/mL injection flush 10 Units, 10 Units, Intravenous, PRN, Esthela Diggs MD, 500 Units at 05/19/21 1308    Current Outpatient  "Medications:     dulaglutide (TRULICITY) 1.5 mg/0.5 mL pen injector, Inject 1.5 mg into the skin every 7 days., Disp: 4 pen, Rfl: 5    insulin detemir U-100 (LEVEMIR FLEXTOUCH) 100 unit/mL (3 mL) SubQ InPn pen, Inject 24 Units into the skin every evening., Disp: 15 mL, Rfl: 6    lisinopriL (PRINIVIL,ZESTRIL) 20 MG tablet, Take 1 tablet (20 mg total) by mouth once daily., Disp: 90 tablet, Rfl: 3    metFORMIN (GLUCOPHAGE) 500 MG tablet, Take 2 tablets by mouth in the morning, and take 2 tablets by mouth at night. (Patient taking differently: Take 2 tablets by mouth in the morning, and take 2 tablets by mouth at night.), Disp: 360 tablet, Rfl: 3    acetaminophen (TYLENOL) 325 MG tablet, Take 2 tablets (650 mg total) by mouth every 6 (six) hours as needed., Disp: 30 tablet, Rfl: 0    aspirin 81 MG Chew, Chew and swallow 1 tablet (81 mg total) by mouth once daily., Disp: 90 tablet, Rfl: 3    flu vacc [id number] 6mos up,PF, 60 mcg (15 mcg x 4)/0.5 mL Syrg, inject IM by [facility], Disp: 0.5 mL, Rfl: 0    insulin aspart U-100 (NOVOLOG) 100 unit/mL (3 mL) InPn pen, Inject 8 units before meals plus scale 150-200+2, 201-250+4, 251-300+6, 301-350+8, >350+10. Max daily 54 units. (Patient not taking: Reported on 4/22/2022), Disp: 15 mL, Rfl: 6    metroNIDAZOLE (FLAGYL) 500 MG tablet, Take 1 tablet by mouth at 1:00 pm, 2:00 pm, and 11:00 pm the day before surgery., Disp: 3 tablet, Rfl: 0    neomycin (MYCIFRADIN) 500 mg Tab, Take 2 tablets by mouth at 1:00 pm, 2:00 pm, and 11:00 pm the day before surgery., Disp: 6 tablet, Rfl: 0    ONETOUCH DELICA LANCETS 33 gauge Misc, , Disp: , Rfl:     ONETOUCH ULTRA2 kit, , Disp: , Rfl:     pen needle, diabetic (BD SASCHA 2ND GEN PEN NEEDLE) 32 gauge x 5/32" Ndle, Use 4 times a day (Patient taking differently: Use 4 times a day), Disp: 400 each, Rfl: 3    permethrin (ELIMITE) 5 % cream, Apply neck down at night for 1 application. Wash off in morning and Repeat in 1 week (Patient not " taking: Reported on 4/22/2022), Disp: 60 g, Rfl: 1    polyethylene glycol (GLYCOLAX) 17 gram/dose powder, Take 8 doses of Miralax with  64 ounces of fluids the day before surgery., Disp: 1020 g, Rfl: 0    Past Surgical History:   Procedure Laterality Date    COLONOSCOPY Right 1/19/2022    Procedure: COLONOSCOPY;  Surgeon: Tj Haile MD;  Location: [facility] ENDO (4TH FLR);  Service: Endoscopy;  Laterality: Right;  previous order un-usable/poor prep 1/18/22  RAPID COVID test arrival 12:20  instructions handed to pt- sm    COLONOSCOPY N/A 3/21/2022    Procedure: COLONOSCOPY;  Surgeon: Sheng Haque MD;  Location: [facility] ENDO (2ND FLR);  Service: Endoscopy;  Laterality: N/A;  Colonoscopy with AES for hepatix flexure polyp removal, 2nd floor  Pt is fully vaccinated-DS  Pt prescribed Plavix by Dr. Stahl in Jan. 2022, however pt states that he never started taking it-DS  3/16/22-Instructions sent via portal-DS    DEBRIDEMENT OF FOOT Left 7/14/2019    Procedure: DEBRIDEMENT, FOOT, with left 5th ray amputation;  Surgeon: Sis Hickman DPM;  Location: [facility] OR 2ND FLR;  Service: Podiatry;  Laterality: Left;    DEBRIDEMENT OF FOOT Left 7/17/2019    Procedure: DEBRIDEMENT, FOOT with leftt 5th ray partial amputation with Neox Graft;  Surgeon: Mai Burrell DPM;  Location: [facility] OR 2ND FLR;  Service: Podiatry;  Laterality: Left;  t    DEBRIDEMENT OF FOOT Bilateral 4/29/2021    Procedure: DEBRIDEMENT, FOOT;  Surgeon: Mai Burrell DPM;  Location: [facility] OR 1ST FLR;  Service: Podiatry;  Laterality: Bilateral;  Graft application    TOE AMPUTATION Right 06/05/2015    TOE AMPUTATION Right 01/19/2017       Tobacco Use: Low Risk     Smoking Tobacco Use: Never Smoker    Smokeless Tobacco Use: Never Used     Alcohol Use: Not on file     Social History     Substance and Sexual Activity   Drug Use No       OBJECTIVE:     Vital Signs Range (Last 24H):         Significant Labs:  Lab Results   Component Value Date     WBC 4.60 04/11/2022    HGB 12.3 (L) 04/11/2022    HCT 40.3 04/11/2022     04/11/2022     04/11/2022    K 4.5 04/11/2022     04/11/2022    CREATININE 1.2 04/11/2022    BUN 15 04/11/2022    CO2 28 04/11/2022    INR 1.1 01/16/2017    HGBA1C 8.1 (H) 09/15/2021       Diagnostic Studies: No relevant studies.    EKG:   Results for orders placed or performed during the hospital encounter of 04/14/22   SCHEDULED EKG 12-LEAD (to Muse)    Collection Time: 04/14/22 11:23 AM    Narrative    Test Reason : Z41.9,    Vent. Rate : 080 BPM     Atrial Rate : 080 BPM     P-R Int : 148 ms          QRS Dur : 094 ms      QT Int : 394 ms       P-R-T Axes : 076 079 039 degrees     QTc Int : 454 ms    Normal sinus rhythm  Normal ECG  When compared with ECG of 28-FEB-2022 08:42,  Previous ECG has undetermined rhythm, needs review  Confirmed by Bull Webb MD ([id number]) on 4/14/2022 4:09:06 PM    Referred By: VIBHA ROTH           Confirmed By:Bull Webb MD       TTE ():  Results for orders placed during the hospital encounter of 02/28/22    Echo    Interpretation Summary  · Eccentric hypertrophy and low normal systolic function.  · Mild to moderate left atrial enlargement.  · Mild tricuspid regurgitation.  · The estimated ejection fraction is 50%.  · Grade I left ventricular diastolic dysfunction.  · Normal right ventricular size with normal right ventricular systolic function.  · Mild right atrial enlargement.  · Normal central venous pressure (3 mmHg).  · The estimated PA systolic pressure is 21 mmHg.      FREDA ():  No results found for this or any previous visit.        ASSESSMENT/PLAN:           Pre-op Assessment    I have reviewed the Patient Summary Reports.     I have reviewed the Nursing Notes. I have reviewed the NPO Status.      Review of Systems  Anesthesia Hx:  History of prior surgery of interest to airway management or planning:  Denies Personal Hx of Anesthesia complications.    Hematology/Oncology:        Current/Recent Cancer.   EENT/Dental:EENT/Dental Normal   Cardiovascular:   Hypertension PVD hyperlipidemia    Pulmonary:  Pulmonary Normal    Hepatic/GI:   Bowel Prep. Colon adenocarcinoma    Musculoskeletal:  Musculoskeletal Normal    Neurological:  Neurology Normal    Endocrine:   Diabetes, poorly controlled, type 2, using insulin        Physical Exam  General: Well nourished    Airway:  Mallampati: III / III  Mouth Opening: Normal  TM Distance: Normal  Neck ROM: Normal ROM    Dental:  Intact    Chest/Lungs:  Clear to auscultation, Normal Respiratory Rate    Heart:  Rate: Normal  Rhythm: Regular Rhythm  Sounds: Normal        Anesthesia Plan  Type of Anesthesia, risks & benefits discussed:    Anesthesia Type: Gen ETT  Intra-op Monitoring Plan: Standard ASA Monitors  Post Op Pain Control Plan: multimodal analgesia and IV/PO Opioids PRN  Induction:  IV  Airway Plan: Direct, Post-Induction  ASA Score: 3  Day of Surgery Review of History & Physical: H&P Update referred to the surgeon/provider.    Ready For Surgery From Anesthesia Perspective.     .

## 2022-04-25 ENCOUNTER — HOSPITAL ENCOUNTER (INPATIENT)
Facility: HOSPITAL | Age: 52
LOS: 4 days | Discharge: HOME | DRG: 330 | End: 2022-04-29
Attending: STUDENT IN AN ORGANIZED HEALTH CARE EDUCATION/TRAINING PROGRAM | Admitting: STUDENT IN AN ORGANIZED HEALTH CARE EDUCATION/TRAINING PROGRAM
Payer: MEDICARE

## 2022-04-25 ENCOUNTER — ANESTHESIA (OUTPATIENT)
Dept: SURGERY | Facility: HOSPITAL | Age: 52
DRG: 330 | End: 2022-04-25
Payer: MEDICARE

## 2022-04-25 DIAGNOSIS — E11.65 TYPE 2 DIABETES MELLITUS WITH HYPERGLYCEMIA, WITH LONG-TERM CURRENT USE OF INSULIN: ICD-10-CM

## 2022-04-25 DIAGNOSIS — E08.621 DIABETIC ULCER OF LEFT MIDFOOT ASSOCIATED WITH DIABETES MELLITUS DUE TO UNDERLYING CONDITION, WITH FAT LAYER EXPOSED: Primary | ICD-10-CM

## 2022-04-25 DIAGNOSIS — E08.621 DIABETIC ULCER OF RIGHT MIDFOOT ASSOCIATED WITH DIABETES MELLITUS DUE TO UNDERLYING CONDITION, WITH FAT LAYER EXPOSED: ICD-10-CM

## 2022-04-25 DIAGNOSIS — L97.412 DIABETIC ULCER OF RIGHT MIDFOOT ASSOCIATED WITH DIABETES MELLITUS DUE TO UNDERLYING CONDITION, WITH FAT LAYER EXPOSED: ICD-10-CM

## 2022-04-25 DIAGNOSIS — E78.2 MIXED HYPERLIPIDEMIA: ICD-10-CM

## 2022-04-25 DIAGNOSIS — Z79.4 TYPE 2 DIABETES MELLITUS WITH HYPERGLYCEMIA, WITH LONG-TERM CURRENT USE OF INSULIN: ICD-10-CM

## 2022-04-25 DIAGNOSIS — C18.9 COLON ADENOCARCINOMA: ICD-10-CM

## 2022-04-25 DIAGNOSIS — L97.422 DIABETIC ULCER OF LEFT MIDFOOT ASSOCIATED WITH DIABETES MELLITUS DUE TO UNDERLYING CONDITION, WITH FAT LAYER EXPOSED: Primary | ICD-10-CM

## 2022-04-25 DIAGNOSIS — C18.2 MALIGNANT NEOPLASM OF ASCENDING COLON: ICD-10-CM

## 2022-04-25 LAB
ABO + RH BLD: NORMAL
BLD GP AB SCN CELLS X3 SERPL QL: NORMAL
POCT GLUCOSE: 150 MG/DL (ref 70–110)
POCT GLUCOSE: 251 MG/DL (ref 70–110)
POCT GLUCOSE: 355 MG/DL (ref 70–110)
POCT GLUCOSE: 357 MG/DL (ref 70–110)

## 2022-04-25 PROCEDURE — 82962 GLUCOSE BLOOD TEST: CPT | Performed by: STUDENT IN AN ORGANIZED HEALTH CARE EDUCATION/TRAINING PROGRAM

## 2022-04-25 PROCEDURE — 71000015 HC POSTOP RECOV 1ST HR: Performed by: STUDENT IN AN ORGANIZED HEALTH CARE EDUCATION/TRAINING PROGRAM

## 2022-04-25 PROCEDURE — 99499 NO LOS: ICD-10-PCS | Mod: ,,, | Performed by: COLON & RECTAL SURGERY

## 2022-04-25 PROCEDURE — 63600175 PHARM REV CODE 636 W HCPCS: Performed by: NURSE PRACTITIONER

## 2022-04-25 PROCEDURE — 27201423 OPTIME MED/SURG SUP & DEVICES STERILE SUPPLY: Performed by: STUDENT IN AN ORGANIZED HEALTH CARE EDUCATION/TRAINING PROGRAM

## 2022-04-25 PROCEDURE — 37000008 HC ANESTHESIA 1ST 15 MINUTES: Performed by: STUDENT IN AN ORGANIZED HEALTH CARE EDUCATION/TRAINING PROGRAM

## 2022-04-25 PROCEDURE — 88307 TISSUE EXAM BY PATHOLOGIST: CPT | Performed by: PATHOLOGY

## 2022-04-25 PROCEDURE — 99499 UNLISTED E&M SERVICE: CPT | Mod: ,,, | Performed by: COLON & RECTAL SURGERY

## 2022-04-25 PROCEDURE — 36000712 HC OR TIME LEV V 1ST 15 MIN: Performed by: STUDENT IN AN ORGANIZED HEALTH CARE EDUCATION/TRAINING PROGRAM

## 2022-04-25 PROCEDURE — 63600175 PHARM REV CODE 636 W HCPCS: Performed by: ANESTHESIOLOGY

## 2022-04-25 PROCEDURE — D9220A PRA ANESTHESIA: ICD-10-PCS | Mod: ,,, | Performed by: ANESTHESIOLOGY

## 2022-04-25 PROCEDURE — 20600001 HC STEP DOWN PRIVATE ROOM

## 2022-04-25 PROCEDURE — 88307 PR  SURG PATH,LEVEL V: ICD-10-PCS | Mod: 26,,, | Performed by: PATHOLOGY

## 2022-04-25 PROCEDURE — D9220A PRA ANESTHESIA: Mod: ,,, | Performed by: ANESTHESIOLOGY

## 2022-04-25 PROCEDURE — 44205 LAP COLECTOMY PART W/ILEUM: CPT | Mod: GC,,, | Performed by: STUDENT IN AN ORGANIZED HEALTH CARE EDUCATION/TRAINING PROGRAM

## 2022-04-25 PROCEDURE — 36000713 HC OR TIME LEV V EA ADD 15 MIN: Performed by: STUDENT IN AN ORGANIZED HEALTH CARE EDUCATION/TRAINING PROGRAM

## 2022-04-25 PROCEDURE — 86850 RBC ANTIBODY SCREEN: CPT | Performed by: NURSE PRACTITIONER

## 2022-04-25 PROCEDURE — 25000003 PHARM REV CODE 250: Performed by: STUDENT IN AN ORGANIZED HEALTH CARE EDUCATION/TRAINING PROGRAM

## 2022-04-25 PROCEDURE — S0030 INJECTION, METRONIDAZOLE: HCPCS | Performed by: NURSE PRACTITIONER

## 2022-04-25 PROCEDURE — 71000033 HC RECOVERY, INTIAL HOUR: Performed by: STUDENT IN AN ORGANIZED HEALTH CARE EDUCATION/TRAINING PROGRAM

## 2022-04-25 PROCEDURE — 25000003 PHARM REV CODE 250: Performed by: NURSE PRACTITIONER

## 2022-04-25 PROCEDURE — 94761 N-INVAS EAR/PLS OXIMETRY MLT: CPT

## 2022-04-25 PROCEDURE — 88307 TISSUE EXAM BY PATHOLOGIST: CPT | Mod: 26,,, | Performed by: PATHOLOGY

## 2022-04-25 PROCEDURE — 63600175 PHARM REV CODE 636 W HCPCS: Performed by: STUDENT IN AN ORGANIZED HEALTH CARE EDUCATION/TRAINING PROGRAM

## 2022-04-25 PROCEDURE — C9399 UNCLASSIFIED DRUGS OR BIOLOG: HCPCS | Performed by: STUDENT IN AN ORGANIZED HEALTH CARE EDUCATION/TRAINING PROGRAM

## 2022-04-25 PROCEDURE — 44205 PR LAP,SURG,COLECTOMY,W/REMVL TERM ILEUM: ICD-10-PCS | Mod: GC,,, | Performed by: STUDENT IN AN ORGANIZED HEALTH CARE EDUCATION/TRAINING PROGRAM

## 2022-04-25 PROCEDURE — 37000009 HC ANESTHESIA EA ADD 15 MINS: Performed by: STUDENT IN AN ORGANIZED HEALTH CARE EDUCATION/TRAINING PROGRAM

## 2022-04-25 PROCEDURE — 71000016 HC POSTOP RECOV ADDL HR: Performed by: STUDENT IN AN ORGANIZED HEALTH CARE EDUCATION/TRAINING PROGRAM

## 2022-04-25 RX ORDER — GLUCAGON 1 MG
1 KIT INJECTION
Status: DISCONTINUED | OUTPATIENT
Start: 2022-04-25 | End: 2022-04-29 | Stop reason: HOSPADM

## 2022-04-25 RX ORDER — ENOXAPARIN SODIUM 100 MG/ML
40 INJECTION SUBCUTANEOUS EVERY 24 HOURS
Status: DISCONTINUED | OUTPATIENT
Start: 2022-04-26 | End: 2022-04-29 | Stop reason: HOSPADM

## 2022-04-25 RX ORDER — KETAMINE HCL IN 0.9 % NACL 50 MG/5 ML
SYRINGE (ML) INTRAVENOUS
Status: DISCONTINUED | OUTPATIENT
Start: 2022-04-25 | End: 2022-04-25

## 2022-04-25 RX ORDER — TRIPROLIDINE/PSEUDOEPHEDRINE 2.5MG-60MG
600 TABLET ORAL
Status: COMPLETED | OUTPATIENT
Start: 2022-04-25 | End: 2022-04-25

## 2022-04-25 RX ORDER — MUPIROCIN 20 MG/G
1 OINTMENT TOPICAL
Status: COMPLETED | OUTPATIENT
Start: 2022-04-25 | End: 2022-04-25

## 2022-04-25 RX ORDER — INSULIN ASPART 100 [IU]/ML
6 INJECTION, SOLUTION INTRAVENOUS; SUBCUTANEOUS ONCE
Status: COMPLETED | OUTPATIENT
Start: 2022-04-25 | End: 2022-04-25

## 2022-04-25 RX ORDER — HALOPERIDOL 5 MG/ML
0.5 INJECTION INTRAMUSCULAR EVERY 10 MIN PRN
Status: DISCONTINUED | OUTPATIENT
Start: 2022-04-25 | End: 2022-04-25 | Stop reason: HOSPADM

## 2022-04-25 RX ORDER — INSULIN ASPART 100 [IU]/ML
1-10 INJECTION, SOLUTION INTRAVENOUS; SUBCUTANEOUS
Status: DISCONTINUED | OUTPATIENT
Start: 2022-04-25 | End: 2022-04-29 | Stop reason: HOSPADM

## 2022-04-25 RX ORDER — PHENYLEPHRINE HCL IN 0.9% NACL 1 MG/10 ML
SYRINGE (ML) INTRAVENOUS
Status: DISCONTINUED | OUTPATIENT
Start: 2022-04-25 | End: 2022-04-25

## 2022-04-25 RX ORDER — MIDAZOLAM HYDROCHLORIDE 1 MG/ML
INJECTION, SOLUTION INTRAMUSCULAR; INTRAVENOUS
Status: DISCONTINUED | OUTPATIENT
Start: 2022-04-25 | End: 2022-04-25

## 2022-04-25 RX ORDER — VASOPRESSIN 20 [USP'U]/ML
INJECTION, SOLUTION INTRAMUSCULAR; SUBCUTANEOUS
Status: DISCONTINUED | OUTPATIENT
Start: 2022-04-25 | End: 2022-04-25

## 2022-04-25 RX ORDER — GABAPENTIN 300 MG/1
300 CAPSULE ORAL
Status: COMPLETED | OUTPATIENT
Start: 2022-04-25 | End: 2022-04-25

## 2022-04-25 RX ORDER — MUPIROCIN 20 MG/G
OINTMENT TOPICAL 2 TIMES DAILY
Status: DISCONTINUED | OUTPATIENT
Start: 2022-04-25 | End: 2022-04-29 | Stop reason: HOSPADM

## 2022-04-25 RX ORDER — NEOSTIGMINE METHYLSULFATE 0.5 MG/ML
INJECTION, SOLUTION INTRAVENOUS
Status: DISCONTINUED | OUTPATIENT
Start: 2022-04-25 | End: 2022-04-25

## 2022-04-25 RX ORDER — FENTANYL CITRATE 50 UG/ML
INJECTION, SOLUTION INTRAMUSCULAR; INTRAVENOUS
Status: DISCONTINUED | OUTPATIENT
Start: 2022-04-25 | End: 2022-04-25

## 2022-04-25 RX ORDER — LIDOCAINE HYDROCHLORIDE ANHYDROUS AND DEXTROSE MONOHYDRATE .8; 5 G/100ML; G/100ML
INJECTION, SOLUTION INTRAVENOUS CONTINUOUS PRN
Status: DISCONTINUED | OUTPATIENT
Start: 2022-04-25 | End: 2022-04-25

## 2022-04-25 RX ORDER — ACETAMINOPHEN 10 MG/ML
1000 INJECTION, SOLUTION INTRAVENOUS EVERY 8 HOURS
Status: DISPENSED | OUTPATIENT
Start: 2022-04-25 | End: 2022-04-26

## 2022-04-25 RX ORDER — SODIUM CHLORIDE 9 MG/ML
INJECTION, SOLUTION INTRAVENOUS CONTINUOUS
Status: DISCONTINUED | OUTPATIENT
Start: 2022-04-25 | End: 2022-04-26

## 2022-04-25 RX ORDER — SODIUM CHLORIDE 9 MG/ML
INJECTION, SOLUTION INTRAVENOUS CONTINUOUS
Status: DISCONTINUED | OUTPATIENT
Start: 2022-04-25 | End: 2022-04-25

## 2022-04-25 RX ORDER — ONDANSETRON 8 MG/1
8 TABLET, ORALLY DISINTEGRATING ORAL EVERY 8 HOURS PRN
Status: DISCONTINUED | OUTPATIENT
Start: 2022-04-25 | End: 2022-04-29 | Stop reason: HOSPADM

## 2022-04-25 RX ORDER — IBUPROFEN 200 MG
24 TABLET ORAL
Status: DISCONTINUED | OUTPATIENT
Start: 2022-04-25 | End: 2022-04-29 | Stop reason: HOSPADM

## 2022-04-25 RX ORDER — ROCURONIUM BROMIDE 10 MG/ML
INJECTION, SOLUTION INTRAVENOUS
Status: DISCONTINUED | OUTPATIENT
Start: 2022-04-25 | End: 2022-04-25

## 2022-04-25 RX ORDER — GABAPENTIN 300 MG/1
300 CAPSULE ORAL 3 TIMES DAILY
Status: DISCONTINUED | OUTPATIENT
Start: 2022-04-25 | End: 2022-04-27

## 2022-04-25 RX ORDER — ATORVASTATIN CALCIUM 20 MG/1
80 TABLET, FILM COATED ORAL DAILY
Status: DISCONTINUED | OUTPATIENT
Start: 2022-04-26 | End: 2022-04-29 | Stop reason: HOSPADM

## 2022-04-25 RX ORDER — HEPARIN SODIUM 5000 [USP'U]/ML
5000 INJECTION, SOLUTION INTRAVENOUS; SUBCUTANEOUS EVERY 8 HOURS
Status: COMPLETED | OUTPATIENT
Start: 2022-04-25 | End: 2022-04-25

## 2022-04-25 RX ORDER — ONDANSETRON 2 MG/ML
INJECTION INTRAMUSCULAR; INTRAVENOUS
Status: DISCONTINUED | OUTPATIENT
Start: 2022-04-25 | End: 2022-04-25

## 2022-04-25 RX ORDER — ALVIMOPAN 12 MG/1
12 CAPSULE ORAL ONCE
Status: COMPLETED | OUTPATIENT
Start: 2022-04-25 | End: 2022-04-25

## 2022-04-25 RX ORDER — METRONIDAZOLE 500 MG/100ML
500 INJECTION, SOLUTION INTRAVENOUS
Status: COMPLETED | OUTPATIENT
Start: 2022-04-25 | End: 2022-04-25

## 2022-04-25 RX ORDER — SODIUM CHLORIDE 0.9 % (FLUSH) 0.9 %
10 SYRINGE (ML) INJECTION
Status: DISCONTINUED | OUTPATIENT
Start: 2022-04-25 | End: 2022-04-29 | Stop reason: HOSPADM

## 2022-04-25 RX ORDER — SODIUM CHLORIDE 0.9 % (FLUSH) 0.9 %
10 SYRINGE (ML) INJECTION
Status: DISCONTINUED | OUTPATIENT
Start: 2022-04-25 | End: 2022-04-25 | Stop reason: HOSPADM

## 2022-04-25 RX ORDER — IBUPROFEN 200 MG
16 TABLET ORAL
Status: DISCONTINUED | OUTPATIENT
Start: 2022-04-25 | End: 2022-04-29 | Stop reason: HOSPADM

## 2022-04-25 RX ORDER — LIDOCAINE HYDROCHLORIDE 20 MG/ML
INJECTION, SOLUTION EPIDURAL; INFILTRATION; INTRACAUDAL; PERINEURAL
Status: DISCONTINUED | OUTPATIENT
Start: 2022-04-25 | End: 2022-04-25

## 2022-04-25 RX ORDER — PROPOFOL 10 MG/ML
VIAL (ML) INTRAVENOUS
Status: DISCONTINUED | OUTPATIENT
Start: 2022-04-25 | End: 2022-04-25

## 2022-04-25 RX ORDER — GABAPENTIN 300 MG/1
300 CAPSULE ORAL 3 TIMES DAILY
Status: DISCONTINUED | OUTPATIENT
Start: 2022-04-25 | End: 2022-04-25

## 2022-04-25 RX ORDER — LISINOPRIL 20 MG/1
20 TABLET ORAL DAILY
Status: DISCONTINUED | OUTPATIENT
Start: 2022-04-26 | End: 2022-04-26

## 2022-04-25 RX ORDER — DEXAMETHASONE SODIUM PHOSPHATE 4 MG/ML
INJECTION, SOLUTION INTRA-ARTICULAR; INTRALESIONAL; INTRAMUSCULAR; INTRAVENOUS; SOFT TISSUE
Status: DISCONTINUED | OUTPATIENT
Start: 2022-04-25 | End: 2022-04-25

## 2022-04-25 RX ORDER — OXYCODONE HYDROCHLORIDE 10 MG/1
10 TABLET ORAL EVERY 4 HOURS PRN
Status: DISCONTINUED | OUTPATIENT
Start: 2022-04-25 | End: 2022-04-29 | Stop reason: HOSPADM

## 2022-04-25 RX ORDER — ACETAMINOPHEN 500 MG
1000 TABLET ORAL EVERY 8 HOURS
Status: DISCONTINUED | OUTPATIENT
Start: 2022-04-26 | End: 2022-04-29 | Stop reason: HOSPADM

## 2022-04-25 RX ORDER — ACETAMINOPHEN 650 MG/20.3ML
975 LIQUID ORAL
Status: COMPLETED | OUTPATIENT
Start: 2022-04-25 | End: 2022-04-25

## 2022-04-25 RX ORDER — LIDOCAINE HYDROCHLORIDE 10 MG/ML
1 INJECTION, SOLUTION EPIDURAL; INFILTRATION; INTRACAUDAL; PERINEURAL
Status: DISCONTINUED | OUTPATIENT
Start: 2022-04-25 | End: 2022-04-25

## 2022-04-25 RX ORDER — HYDROMORPHONE HYDROCHLORIDE 1 MG/ML
0.2 INJECTION, SOLUTION INTRAMUSCULAR; INTRAVENOUS; SUBCUTANEOUS EVERY 5 MIN PRN
Status: DISCONTINUED | OUTPATIENT
Start: 2022-04-25 | End: 2022-04-25 | Stop reason: HOSPADM

## 2022-04-25 RX ORDER — SODIUM CHLORIDE 9 MG/ML
INJECTION, SOLUTION INTRAVENOUS
Status: COMPLETED | OUTPATIENT
Start: 2022-04-25 | End: 2022-04-25

## 2022-04-25 RX ORDER — TRAMADOL HYDROCHLORIDE 50 MG/1
50 TABLET ORAL EVERY 6 HOURS PRN
Status: DISCONTINUED | OUTPATIENT
Start: 2022-04-25 | End: 2022-04-29 | Stop reason: HOSPADM

## 2022-04-25 RX ORDER — ALVIMOPAN 12 MG/1
12 CAPSULE ORAL 2 TIMES DAILY
Status: DISCONTINUED | OUTPATIENT
Start: 2022-04-25 | End: 2022-04-29 | Stop reason: HOSPADM

## 2022-04-25 RX ORDER — OXYCODONE HYDROCHLORIDE 5 MG/1
5 TABLET ORAL EVERY 6 HOURS PRN
Status: DISCONTINUED | OUTPATIENT
Start: 2022-04-25 | End: 2022-04-29 | Stop reason: HOSPADM

## 2022-04-25 RX ADMIN — ONDANSETRON 4 MG: 2 INJECTION INTRAMUSCULAR; INTRAVENOUS at 03:04

## 2022-04-25 RX ADMIN — Medication 100 MCG: at 01:04

## 2022-04-25 RX ADMIN — Medication 200 MCG: at 01:04

## 2022-04-25 RX ADMIN — SODIUM CHLORIDE 6 UNITS/HR: 9 INJECTION, SOLUTION INTRAVENOUS at 12:04

## 2022-04-25 RX ADMIN — SODIUM CHLORIDE: 0.9 INJECTION, SOLUTION INTRAVENOUS at 04:04

## 2022-04-25 RX ADMIN — ACETAMINOPHEN 1000 MG: 10 INJECTION INTRAVENOUS at 09:04

## 2022-04-25 RX ADMIN — MIDAZOLAM 2 MG: 1 INJECTION INTRAMUSCULAR; INTRAVENOUS at 12:04

## 2022-04-25 RX ADMIN — Medication 30 MG: at 02:04

## 2022-04-25 RX ADMIN — INSULIN ASPART 3 UNITS: 100 INJECTION, SOLUTION INTRAVENOUS; SUBCUTANEOUS at 09:04

## 2022-04-25 RX ADMIN — Medication 100 MCG: at 12:04

## 2022-04-25 RX ADMIN — ALVIMOPAN 12 MG: 12 CAPSULE ORAL at 10:04

## 2022-04-25 RX ADMIN — MUPIROCIN 1 G: 20 OINTMENT TOPICAL at 10:04

## 2022-04-25 RX ADMIN — IBUPROFEN 600 MG: 100 SUSPENSION ORAL at 10:04

## 2022-04-25 RX ADMIN — VASOPRESSIN 1 UNITS: 20 INJECTION INTRAVENOUS at 02:04

## 2022-04-25 RX ADMIN — Medication 150 MCG: at 12:04

## 2022-04-25 RX ADMIN — SODIUM CHLORIDE: 0.9 INJECTION, SOLUTION INTRAVENOUS at 10:04

## 2022-04-25 RX ADMIN — DEXAMETHASONE SODIUM PHOSPHATE 4 MG: 4 INJECTION INTRA-ARTICULAR; INTRALESIONAL; INTRAMUSCULAR; INTRAVENOUS; SOFT TISSUE at 12:04

## 2022-04-25 RX ADMIN — SODIUM CHLORIDE, SODIUM GLUCONATE, SODIUM ACETATE, POTASSIUM CHLORIDE, MAGNESIUM CHLORIDE, SODIUM PHOSPHATE, DIBASIC, AND POTASSIUM PHOSPHATE: .53; .5; .37; .037; .03; .012; .00082 INJECTION, SOLUTION INTRAVENOUS at 12:04

## 2022-04-25 RX ADMIN — CEFTRIAXONE SODIUM 2 G: 2 INJECTION, SOLUTION INTRAVENOUS at 12:04

## 2022-04-25 RX ADMIN — ROCURONIUM BROMIDE 20 MG: 10 INJECTION, SOLUTION INTRAVENOUS at 02:04

## 2022-04-25 RX ADMIN — FENTANYL CITRATE 50 MCG: 50 INJECTION INTRAMUSCULAR; INTRAVENOUS at 12:04

## 2022-04-25 RX ADMIN — INSULIN ASPART 6 UNITS: 100 INJECTION, SOLUTION INTRAVENOUS; SUBCUTANEOUS at 10:04

## 2022-04-25 RX ADMIN — INSULIN DETEMIR 12 UNITS: 100 INJECTION, SOLUTION SUBCUTANEOUS at 09:04

## 2022-04-25 RX ADMIN — GABAPENTIN 300 MG: 300 CAPSULE ORAL at 08:04

## 2022-04-25 RX ADMIN — ROCURONIUM BROMIDE 40 MG: 10 INJECTION, SOLUTION INTRAVENOUS at 12:04

## 2022-04-25 RX ADMIN — GABAPENTIN 300 MG: 300 CAPSULE ORAL at 10:04

## 2022-04-25 RX ADMIN — MUPIROCIN: 20 OINTMENT TOPICAL at 09:04

## 2022-04-25 RX ADMIN — ROCURONIUM BROMIDE 30 MG: 10 INJECTION, SOLUTION INTRAVENOUS at 01:04

## 2022-04-25 RX ADMIN — ACETAMINOPHEN 976.6 MG: 160 SOLUTION ORAL at 10:04

## 2022-04-25 RX ADMIN — METRONIDAZOLE 500 MG: 500 SOLUTION INTRAVENOUS at 11:04

## 2022-04-25 RX ADMIN — SODIUM CHLORIDE 0.5 MCG/KG/MIN: 9 INJECTION, SOLUTION INTRAVENOUS at 01:04

## 2022-04-25 RX ADMIN — ALVIMOPAN 12 MG: 12 CAPSULE ORAL at 08:04

## 2022-04-25 RX ADMIN — GLYCOPYRROLATE 0.6 MG: 0.2 INJECTION, SOLUTION INTRAMUSCULAR; INTRAVITREAL at 03:04

## 2022-04-25 RX ADMIN — VASOPRESSIN 1 UNITS: 20 INJECTION INTRAVENOUS at 03:04

## 2022-04-25 RX ADMIN — OXYCODONE HYDROCHLORIDE 10 MG: 10 TABLET ORAL at 04:04

## 2022-04-25 RX ADMIN — NEOSTIGMINE METHYLSULFATE 4 MG: 0.5 INJECTION INTRAVENOUS at 03:04

## 2022-04-25 RX ADMIN — OXYCODONE HYDROCHLORIDE 10 MG: 10 TABLET ORAL at 09:04

## 2022-04-25 RX ADMIN — PROPOFOL 150 MG: 10 INJECTION, EMULSION INTRAVENOUS at 12:04

## 2022-04-25 RX ADMIN — HEPARIN SODIUM 5000 UNITS: 5000 INJECTION INTRAVENOUS; SUBCUTANEOUS at 10:04

## 2022-04-25 RX ADMIN — LIDOCAINE HYDROCHLORIDE 0.05 MG/KG/MIN: 8 INJECTION, SOLUTION INTRAVENOUS at 12:04

## 2022-04-25 RX ADMIN — ROCURONIUM BROMIDE 10 MG: 10 INJECTION, SOLUTION INTRAVENOUS at 01:04

## 2022-04-25 RX ADMIN — SODIUM CHLORIDE: 0.9 INJECTION, SOLUTION INTRAVENOUS at 12:04

## 2022-04-25 RX ADMIN — LIDOCAINE HYDROCHLORIDE 60 MG: 20 INJECTION, SOLUTION EPIDURAL; INFILTRATION; INTRACAUDAL at 12:04

## 2022-04-25 NOTE — INTERVAL H&P NOTE
The patient has been examined and the H&P has been reviewed:    I concur with the findings and changes have been noted since the H&P was written: He was discussed at [facility] and the recommendation to proceed with colon resection was confirmed.     Surgery risks, benefits and alternative options discussed and understood by patient/family.          There are no hospital problems to display for this patient.

## 2022-04-25 NOTE — ANESTHESIA POSTPROCEDURE EVALUATION
Anesthesia Post Evaluation    Patient: Indio Ryder Jr.    Procedure(s) Performed: Procedure(s) (LRB):  XI ROBOTIC COLECTOMY, RIGHT (lithotomy, eras low) (N/A)    Final Anesthesia Type: general      Patient location during evaluation: PACU  Patient participation: Yes- Able to Participate  Level of consciousness: awake and alert  Post-procedure vital signs: reviewed and stable  Pain management: adequate  Airway patency: patent    PONV status at discharge: No PONV  Anesthetic complications: no      Cardiovascular status: blood pressure returned to baseline  Respiratory status: unassisted  Hydration status: euvolemic  Follow-up not needed.          Vitals Value Taken Time   /79 04/25/22 1631   Temp 36.5 °C (97.7 °F) 04/25/22 1608   Pulse 79 04/25/22 1639   Resp 8 04/25/22 1639   SpO2 100 % 04/25/22 1639   Vitals shown include unvalidated device data.      No case tracking events are documented in the log.      Pain/Jenny Score: Pain Rating Prior to Med Admin: 0 (4/25/2022 10:16 AM)  Jenny Score: 8 (4/25/2022  4:30 PM)

## 2022-04-25 NOTE — OP NOTE
[facility]  Colon and Rectal Surgery    [address]  [place], [place]  Tel: [phone]  Fax: [phone]  [email or web address]   MD Fadi Chapman MD Brian Kann, MD W. Forrest Johnston, MD Matthew Giglia, MD Jennifer Paruch, MD William Kethman, MD Danielle Kay, MD     Patient name: Indio Ryder Jr.   YOB: 1970   MRN: [id number]  Date of surgery: 04/25/2022    Operative Report    Pre-operative diagnosis: Right colon adenocarcinoma  Post-operative diagnosis: Same as above    Procedure:  1. Robotic-assisted right hemicolectomy    Findings:  1. No evidence of metastatic disease  2. Tattoo in mid-ascending colon, duodenum and right ureter identified, isoperistaltic ileocolic anastomosis performed in 2 layers            Surgeon: Erik Stout MD  Assistant: Fadi Perkins MD    Indication: 51 year old man with a history of DM and HLD who presents for evaluation of colon adenocarcinoma. He underwent a colonoscopy on 1/19/2022 with Dr. Haile and was found to have a sessile hepatic flexure polyp (single tattoo at that time) > referred to Dr. Haque for EMR (3/21/2022 - initial colonoscopy report was mislabeled, addendum made) - removed in piecemeal fashion but was described as complete resection. His case was discussed at our tumor board and pathology reviewed - the recommendations was to proceed with formal resection. The benefits, risks, and alternatives were discussed with the patient, they were given the opportunity to ask questions and they elected to proceed with operative intervention after signing written consent.    Procedure:  After pre-operative assessment and review of informed consent, the patient was taken to the operating room and received monitored anesthesia care. Pre-operative antibiotics were administeredand the patient was placed in split-leg position with arms tucked. The perineum was prepped and draped in the usual sterile  fashion and a timeout was performed according to [facility] Quality and Safety guidelines.      An 8 mm incision was made in the mid-clavicular line just under the costal margin. A Veress needle was inserted and saline drop test performed. The abdomen was then insufflated to 15 mmHg with reassuring starting pressures. Once completely insufflated an 8 mm trocar was inserted under direct visualization - the layers of abdominal wall were clearly visualized. The abdomen was explored and the underlying bowel and vasculature were inspected and found to be free from any iatrogenic injury. The abdomen was explored and the findings are discussed above. A 12 mm port was placed just inferior to the first port and three additional 8 mm ports were placed in the left abdomen, including in the suprapubic location. These were placed under direct visualization. The patient was placed in standard position and robot docked.    A medial to lateral dissection was performed by elevating the ileocolic pedicle and incising just inferior to the pedicle. The right colonic mesentery was bluntly dissected free from the retroperitoneum - the right ureter and duodenum were identified and protected. The ileocolic pedicle was then divided with a vessel sealer. This allowed further mobilization of the right colon off the retroperitoneal structures. The omentum was dissected free from the hepatic flexure taking care to preserve the colon mesentery and not injure the gallbladder - this allowed complete mobilization of the hepatic flexure and laterally of the ascending colon. The right branch of the middle colic was then divded. The terminal ileal mesentery was mobilized similarly off the retroperitoneum to join our previous dissection allowing us to medialize the entire right colon and transverse colon, and terminal ileum. We then planned our transection site in the transverse colon - the intervening mesentery leading up to the site of transection was  divided with the vessel sealer. This was similarly performed to the terminal ileal mesentery. 3 mL of ICG was diluted in 10 mL of saline and our proximal and distal transection sites were well-perfused. They were then each transected with 60 mm fires of the blue robotic stapler. The specimen was then placed above the liver.    The small bowel was then brought up in appropriate orientation alongside the transverse colon. A 3-0 Vicryl stay suture was placed about 8 cm proximal to the small bowel staple line. Two adjoining enterotomies were made and a common channel formed with a firing of the 60 mm blue robotic stapler. The common enterotomy was approximated in two layers beginning at the posterior aspect of the opening with a 180 day V-loc suture. The omentum was placed overlying the anastomosis.     The 12 mm left upper quadrant incision was enlarged to approximately 3-4 cm and a wound protector was placed. The specimen was brought through this incision and sent for review.    The specimen extraction site was closed in layers - the anterior and posterior sheaths were approximated with 0-PDS running sutures and all sites irrigated thoroughly. The skin was approximated with 3-0 Vicryl interrupted suture at our extraction site and the remaining port sites were approximated with 4-0 Monocryl. Dry dressings were applied.    Instrument, needle, and sponge counts were correct.    The procedure was completed without complication and was well-tolerated. The patient was then brought to the post-anesthesia care unit in stable condition. I was present for the entire operation and discussed the surgery with the patients family at the end of the case.    Complications: None  Estimated blood loss: Minimal  Disposition: PACU      Erik Stout MD - Staff Surgeon  Department of Colon & Rectal Surgery  [facility]

## 2022-04-25 NOTE — TRANSFER OF CARE
"Anesthesia Transfer of Care Note    Patient: Indio Ryder Jr.    Procedure(s) Performed: Procedure(s) (LRB):  XI ROBOTIC COLECTOMY, RIGHT (lithotomy, eras low) (N/A)    Patient location: PACU    Anesthesia Type: general    Transport from OR: Transported from OR on 6-10 L/min O2 by face mask with adequate spontaneous ventilation    Post pain: adequate analgesia    Post assessment: tolerated procedure well and no apparent anesthetic complications    Post vital signs: stable    Level of consciousness: awake    Nausea/Vomiting: no nausea/vomiting    Complications: none    Transfer of care protocol was followed      Last vitals:   Visit Vitals  BP (!) 157/90 (BP Location: Left arm, Patient Position: Lying)   Pulse 90   Temp 36.4 °C (97.5 °F) (Oral)   Resp 16   Ht 6' 1" (1.854 m)   Wt 101.3 kg (223 lb 5.2 oz)   SpO2 100%   BMI 29.46 kg/m²     "

## 2022-04-25 NOTE — ANESTHESIA PROCEDURE NOTES
Intubation    Date/Time: 4/25/2022 12:07 PM  Performed by: Ti Rivers MD  Authorized by: Rhonda G Leopold, MD     Intubation:     Induction:  Intravenous    Intubated:  Postinduction    Mask Ventilation:  Moderately difficult with oral airway    Attempts:  1    Attempted By:  Resident anesthesiologist    Method of Intubation:  Direct    Blade:  Mayito 3    Laryngeal View Grade: Grade IIA - cords partially seen      Difficult Airway Encountered?: No      Complications:  None    Airway Device:  Oral endotracheal tube    Airway Device Size:  8.0    Style/Cuff Inflation:  Cuffed    Tube secured:  22    Secured at:  The teeth    Placement Verified By:  Capnometry    Complicating Factors:  None    Findings Post-Intubation:  BS equal bilateral

## 2022-04-25 NOTE — BRIEF OP NOTE
[facility] - Surgery ([facility])  Brief Operative Note    Surgery Date: 4/25/2022     Surgeon(s) and Role:     * Erik Stout MD - Primary     * Fadi Perkins MD - Resident - Assisting     * Rod Morales MD     * Crystal Robertson MD        Pre-op Diagnosis:  Colon adenocarcinoma [C18.9]    Post-op Diagnosis:  Post-Op Diagnosis Codes:     * Colon adenocarcinoma [C18.9]    Procedure(s) (LRB):  XI ROBOTIC COLECTOMY, RIGHT (lithotomy, eras low) (N/A)    Anesthesia: General    Operative Findings: robotic right colectomy. Tattoo noted in distal ascending colon. No apparent complication    Estimated Blood Loss: minimal          Specimens:   Specimen (24h ago, onward)             Start     Ordered    04/25/22 1535  Specimen to Pathology, Surgery Gastrointestinal tract  Once        Comments: Pre-op Diagnosis: Colon adenocarcinoma [C18.9]Procedure(s):XI ROBOTIC COLECTOMY, RIGHT (lithotomy, eras low) Number of specimens: 1Name of specimens:1.) right colon- permanent     References:    Click here for ordering Quick Tip   Question Answer Comment   Procedure Type: Gastrointestinal tract    Specimen Class: Routine/Screening    Release to patient Immediate        04/25/22 6869

## 2022-04-25 NOTE — NURSING TRANSFER
Nursing Transfer Note      4/25/2022     Reason patient is being transferred: post op admit to [facility]    Transfer To: 1001    Transfer via bed    Transfer with IV pole/fluids    Transported by PCTs    Medicines sent: none    Any special needs or follow-up needed: no    Chart send with patient: Yes    Notified: sister    Patient reassessed at: 1800

## 2022-04-25 NOTE — PROGRESS NOTES
1140-Dr. Leopold notified that pt's blood sugar is 357. She stated an insulin drip will be started in the OR.

## 2022-04-26 PROBLEM — E11.621 DIABETIC ULCER OF RIGHT FOOT ASSOCIATED WITH TYPE 2 DIABETES MELLITUS, WITH FAT LAYER EXPOSED: Status: ACTIVE | Noted: 2022-04-26

## 2022-04-26 PROBLEM — L97.422 DIABETIC ULCER OF LEFT MIDFOOT ASSOCIATED WITH DIABETES MELLITUS DUE TO UNDERLYING CONDITION, WITH FAT LAYER EXPOSED: Status: ACTIVE | Noted: 2019-07-12

## 2022-04-26 PROBLEM — L97.512 DIABETIC ULCER OF RIGHT FOOT ASSOCIATED WITH TYPE 2 DIABETES MELLITUS, WITH FAT LAYER EXPOSED: Status: ACTIVE | Noted: 2022-04-26

## 2022-04-26 LAB
ANION GAP SERPL CALC-SCNC: 14 MMOL/L (ref 8–16)
BASOPHILS # BLD AUTO: 0.01 K/UL (ref 0–0.2)
BASOPHILS NFR BLD: 0.1 % (ref 0–1.9)
BUN SERPL-MCNC: 16 MG/DL (ref 6–20)
CALCIUM SERPL-MCNC: 8.8 MG/DL (ref 8.7–10.5)
CHLORIDE SERPL-SCNC: 105 MMOL/L (ref 95–110)
CO2 SERPL-SCNC: 20 MMOL/L (ref 23–29)
CREAT SERPL-MCNC: 1.2 MG/DL (ref 0.5–1.4)
DIFFERENTIAL METHOD: ABNORMAL
EOSINOPHIL # BLD AUTO: 0 K/UL (ref 0–0.5)
EOSINOPHIL NFR BLD: 0 % (ref 0–8)
ERYTHROCYTE [DISTWIDTH] IN BLOOD BY AUTOMATED COUNT: 13.3 % (ref 11.5–14.5)
EST. GFR  (AFRICAN AMERICAN): >60 ML/MIN/1.73 M^2
EST. GFR  (NON AFRICAN AMERICAN): >60 ML/MIN/1.73 M^2
GLUCOSE SERPL-MCNC: 220 MG/DL (ref 70–110)
HCT VFR BLD AUTO: 40.1 % (ref 40–54)
HGB BLD-MCNC: 12.4 G/DL (ref 14–18)
IMM GRANULOCYTES # BLD AUTO: 0.02 K/UL (ref 0–0.04)
IMM GRANULOCYTES NFR BLD AUTO: 0.2 % (ref 0–0.5)
LYMPHOCYTES # BLD AUTO: 0.7 K/UL (ref 1–4.8)
LYMPHOCYTES NFR BLD: 7.1 % (ref 18–48)
MAGNESIUM SERPL-MCNC: 1.9 MG/DL (ref 1.6–2.6)
MCH RBC QN AUTO: 28.3 PG (ref 27–31)
MCHC RBC AUTO-ENTMCNC: 30.9 G/DL (ref 32–36)
MCV RBC AUTO: 92 FL (ref 82–98)
MONOCYTES # BLD AUTO: 0.5 K/UL (ref 0.3–1)
MONOCYTES NFR BLD: 5.2 % (ref 4–15)
NEUTROPHILS # BLD AUTO: 8.1 K/UL (ref 1.8–7.7)
NEUTROPHILS NFR BLD: 87.4 % (ref 38–73)
NRBC BLD-RTO: 0 /100 WBC
PHOSPHATE SERPL-MCNC: 3.5 MG/DL (ref 2.7–4.5)
PLATELET # BLD AUTO: 190 K/UL (ref 150–450)
PMV BLD AUTO: 12.2 FL (ref 9.2–12.9)
POCT GLUCOSE: 230 MG/DL (ref 70–110)
POCT GLUCOSE: 240 MG/DL (ref 70–110)
POCT GLUCOSE: 242 MG/DL (ref 70–110)
POTASSIUM SERPL-SCNC: 4.7 MMOL/L (ref 3.5–5.1)
RBC # BLD AUTO: 4.38 M/UL (ref 4.6–6.2)
SODIUM SERPL-SCNC: 139 MMOL/L (ref 136–145)
WBC # BLD AUTO: 9.21 K/UL (ref 3.9–12.7)

## 2022-04-26 PROCEDURE — 97116 GAIT TRAINING THERAPY: CPT

## 2022-04-26 PROCEDURE — 11042 DBRDMT SUBQ TIS 1ST 20SQCM/<: CPT | Mod: ,,, | Performed by: PODIATRIST

## 2022-04-26 PROCEDURE — 80048 BASIC METABOLIC PNL TOTAL CA: CPT | Performed by: STUDENT IN AN ORGANIZED HEALTH CARE EDUCATION/TRAINING PROGRAM

## 2022-04-26 PROCEDURE — 25000003 PHARM REV CODE 250: Performed by: STUDENT IN AN ORGANIZED HEALTH CARE EDUCATION/TRAINING PROGRAM

## 2022-04-26 PROCEDURE — 97166 OT EVAL MOD COMPLEX 45 MIN: CPT

## 2022-04-26 PROCEDURE — 99222 1ST HOSP IP/OBS MODERATE 55: CPT | Mod: ,,, | Performed by: NURSE PRACTITIONER

## 2022-04-26 PROCEDURE — 83735 ASSAY OF MAGNESIUM: CPT | Performed by: STUDENT IN AN ORGANIZED HEALTH CARE EDUCATION/TRAINING PROGRAM

## 2022-04-26 PROCEDURE — 85025 COMPLETE CBC W/AUTO DIFF WBC: CPT | Performed by: STUDENT IN AN ORGANIZED HEALTH CARE EDUCATION/TRAINING PROGRAM

## 2022-04-26 PROCEDURE — 99223 1ST HOSP IP/OBS HIGH 75: CPT | Mod: 25,,, | Performed by: PODIATRIST

## 2022-04-26 PROCEDURE — 99222 PR INITIAL HOSPITAL CARE,LEVL II: ICD-10-PCS | Mod: ,,, | Performed by: NURSE PRACTITIONER

## 2022-04-26 PROCEDURE — 11042 DEBRIDEMENT: ICD-10-PCS | Mod: ,,, | Performed by: PODIATRIST

## 2022-04-26 PROCEDURE — 99223 PR INITIAL HOSPITAL CARE,LEVL III: ICD-10-PCS | Mod: 25,,, | Performed by: PODIATRIST

## 2022-04-26 PROCEDURE — 36415 COLL VENOUS BLD VENIPUNCTURE: CPT | Performed by: STUDENT IN AN ORGANIZED HEALTH CARE EDUCATION/TRAINING PROGRAM

## 2022-04-26 PROCEDURE — 97535 SELF CARE MNGMENT TRAINING: CPT

## 2022-04-26 PROCEDURE — 84100 ASSAY OF PHOSPHORUS: CPT | Performed by: STUDENT IN AN ORGANIZED HEALTH CARE EDUCATION/TRAINING PROGRAM

## 2022-04-26 PROCEDURE — 20600001 HC STEP DOWN PRIVATE ROOM

## 2022-04-26 PROCEDURE — 97161 PT EVAL LOW COMPLEX 20 MIN: CPT

## 2022-04-26 PROCEDURE — 63600175 PHARM REV CODE 636 W HCPCS: Performed by: STUDENT IN AN ORGANIZED HEALTH CARE EDUCATION/TRAINING PROGRAM

## 2022-04-26 RX ORDER — SODIUM CHLORIDE, SODIUM LACTATE, POTASSIUM CHLORIDE, CALCIUM CHLORIDE 600; 310; 30; 20 MG/100ML; MG/100ML; MG/100ML; MG/100ML
INJECTION, SOLUTION INTRAVENOUS CONTINUOUS
Status: DISCONTINUED | OUTPATIENT
Start: 2022-04-26 | End: 2022-04-27

## 2022-04-26 RX ORDER — INSULIN ASPART 100 [IU]/ML
5 INJECTION, SOLUTION INTRAVENOUS; SUBCUTANEOUS
Status: DISCONTINUED | OUTPATIENT
Start: 2022-04-26 | End: 2022-04-26

## 2022-04-26 RX ORDER — TAMSULOSIN HYDROCHLORIDE 0.4 MG/1
0.8 CAPSULE ORAL DAILY
Status: DISCONTINUED | OUTPATIENT
Start: 2022-04-26 | End: 2022-04-29 | Stop reason: HOSPADM

## 2022-04-26 RX ORDER — LISINOPRIL 10 MG/1
10 TABLET ORAL DAILY
Status: DISCONTINUED | OUTPATIENT
Start: 2022-04-27 | End: 2022-04-29 | Stop reason: HOSPADM

## 2022-04-26 RX ORDER — HYDRALAZINE HYDROCHLORIDE 20 MG/ML
10 INJECTION INTRAMUSCULAR; INTRAVENOUS EVERY 6 HOURS PRN
Status: CANCELLED | OUTPATIENT
Start: 2022-04-26

## 2022-04-26 RX ORDER — LISINOPRIL 20 MG/1
20 TABLET ORAL DAILY
Status: DISCONTINUED | OUTPATIENT
Start: 2022-04-26 | End: 2022-04-26

## 2022-04-26 RX ADMIN — INSULIN ASPART 2 UNITS: 100 INJECTION, SOLUTION INTRAVENOUS; SUBCUTANEOUS at 09:04

## 2022-04-26 RX ADMIN — GABAPENTIN 300 MG: 300 CAPSULE ORAL at 09:04

## 2022-04-26 RX ADMIN — TAMSULOSIN HYDROCHLORIDE 0.8 MG: 0.4 CAPSULE ORAL at 09:04

## 2022-04-26 RX ADMIN — GABAPENTIN 300 MG: 300 CAPSULE ORAL at 04:04

## 2022-04-26 RX ADMIN — ENOXAPARIN SODIUM 40 MG: 100 INJECTION SUBCUTANEOUS at 04:04

## 2022-04-26 RX ADMIN — INSULIN ASPART 4 UNITS: 100 INJECTION, SOLUTION INTRAVENOUS; SUBCUTANEOUS at 09:04

## 2022-04-26 RX ADMIN — ACETAMINOPHEN 1000 MG: 500 TABLET ORAL at 09:04

## 2022-04-26 RX ADMIN — SODIUM CHLORIDE, SODIUM LACTATE, POTASSIUM CHLORIDE, AND CALCIUM CHLORIDE: .6; .31; .03; .02 INJECTION, SOLUTION INTRAVENOUS at 04:04

## 2022-04-26 RX ADMIN — MUPIROCIN: 20 OINTMENT TOPICAL at 09:04

## 2022-04-26 RX ADMIN — LISINOPRIL 20 MG: 20 TABLET ORAL at 09:04

## 2022-04-26 RX ADMIN — ONDANSETRON 8 MG: 8 TABLET, ORALLY DISINTEGRATING ORAL at 08:04

## 2022-04-26 RX ADMIN — ALVIMOPAN 12 MG: 12 CAPSULE ORAL at 09:04

## 2022-04-26 RX ADMIN — ATORVASTATIN CALCIUM 80 MG: 20 TABLET, FILM COATED ORAL at 09:04

## 2022-04-26 RX ADMIN — ACETAMINOPHEN 1000 MG: 10 INJECTION INTRAVENOUS at 05:04

## 2022-04-26 NOTE — PROGRESS NOTES
[facility]  Colorectal Surgery  Progress Note    Patient Name: Indio Ryder Jr.  MRN: [id number]  Admission Date: 4/25/2022  Hospital Length of Stay: 1 days  Attending Physician: Erik Stout MD    Subjective:     Interval History: No acute events overnight. Hypertensive but otherwise hemodynamically stable. Pain is well controlled. Denies nausea or vomiting but does have belching. He has not had gas or a BM yet.    Post-Op Info:  Procedure(s) (LRB):  XI ROBOTIC COLECTOMY, RIGHT (lithotomy, eras low) (N/A)   1 Day Post-Op      Medications:  Continuous Infusions:   sodium chloride 0.9% 40 mL/hr at 04/26/22 0628     Scheduled Meds:   acetaminophen  1,000 mg Intravenous Q8H    Followed by    acetaminophen  1,000 mg Oral Q8H    alvimopan  12 mg Oral BID    atorvastatin  80 mg Oral Daily    enoxaparin  40 mg Subcutaneous Daily    gabapentin  300 mg Oral TID    insulin detemir U-100  12 Units Subcutaneous QHS    lisinopriL  20 mg Oral Daily    mupirocin   Nasal BID     PRN Meds:   dextrose 10%    dextrose 10%    glucagon (human recombinant)    glucose    glucose    insulin aspart U-100    ondansetron    oxyCODONE    oxyCODONE    sodium chloride 0.9%    traMADoL        Objective:     Vital Signs (Most Recent):  Temp: 97 °F (36.1 °C) (04/26/22 0418)  Pulse: 103 (04/26/22 0418)  Resp: 20 (04/26/22 0418)  BP: (!) 189/112 (04/26/22 0418)  SpO2: 96 % (04/26/22 0418)   Vital Signs (24h Range):  Temp:  [95.7 °F (35.4 °C)-97.8 °F (36.6 °C)] 97 °F (36.1 °C)  Pulse:  [] 103  Resp:  [10-20] 20  SpO2:  [94 %-100 %] 96 %  BP: (125-189)/() 189/112     Intake/Output - Last 3 Shifts         04/24 0700  04/25 0659 04/25 0700  04/26 0659    I.V. (mL/kg)  451.7 (4.5)    IV Piggyback  2253.6    Total Intake(mL/kg)  2705.3 (26.7)    Urine (mL/kg/hr)  1500    Total Output  1500    Net  +1205.3          Stool Occurrence  0 x            Physical Exam  Constitutional:       General: He is not in  acute distress.  HENT:      Head: Normocephalic and atraumatic.   Eyes:      Extraocular Movements: Extraocular movements intact.      Pupils: Pupils are equal, round, and reactive to light.   Cardiovascular:      Rate and Rhythm: Normal rate and regular rhythm.   Pulmonary:      Effort: Pulmonary effort is normal. No respiratory distress.   Abdominal:      General: There is distension.      Palpations: Abdomen is soft.      Tenderness: There is abdominal tenderness (appropriately tender).      Comments: Incisions are clean dry and intact with dressing in place over extraction site.   Musculoskeletal:      Comments: Dressing on left foot   Skin:     General: Skin is warm and dry.      Capillary Refill: Capillary refill takes less than 2 seconds.   Neurological:      General: No focal deficit present.      Mental Status: He is alert and oriented to person, place, and time.       Significant Labs:  BMP (Last 3 Results): No results for input(s): GLU, NA, K, CL, CO2, BUN, CREATININE, CALCIUM, MG in the last 168 hours.  CBC (Last 3 Results):   Recent Labs   Lab 04/26/22  0232   WBC 9.21   RBC 4.38*   HGB 12.4*   HCT 40.1      MCV 92   MCH 28.3   MCHC 30.9*       Significant Diagnostics:  I have reviewed all pertinent imaging results/findings within the past 24 hours.    Assessment/Plan:     * Colon adenocarcinoma  51 year old man presenting with colon adenocarcinoma arising from sessile polyp removed in piecemeal fashion, margins positive for high-grade dysplasia now s/p robotic right colectomy on 4/25.    - Clear liquid diet, advised patient to avoid carbonated beverages today  - MM pain control  - Continue to hold home ACE, will place orders for PRN antihypertensives  - Sliding scale insulin ordered, endocrinology consult placed for hyperglycemia and diabetes management  -Holding home plavix  - Lovenox for DVT ppx  - PT/OT, non weight bearing on left foot  - Podiatry consult in for left foot wound that was  present prior to admission          Waleska Bowling MD  Colorectal Surgery  [facility]

## 2022-04-26 NOTE — SUBJECTIVE & OBJECTIVE
Scheduled Meds:   acetaminophen  1,000 mg Intravenous Q8H    Followed by    acetaminophen  1,000 mg Oral Q8H    alvimopan  12 mg Oral BID    atorvastatin  80 mg Oral Daily    enoxaparin  40 mg Subcutaneous Daily    gabapentin  300 mg Oral TID    insulin detemir U-100  16 Units Subcutaneous QHS    [START ON 4/27/2022] lisinopriL  10 mg Oral Daily    mupirocin   Nasal BID    tamsulosin  0.8 mg Oral Daily     Continuous Infusions:   sodium chloride 0.9% 40 mL/hr at 04/26/22 0628     PRN Meds:dextrose 10%, dextrose 10%, glucagon (human recombinant), glucose, glucose, insulin aspart U-100, ondansetron, oxyCODONE, oxyCODONE, sodium chloride 0.9%, traMADoL    Review of patient's allergies indicates:  No Known Allergies     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    Right foot    Colon adenocarcinoma 04/12/2022    Diabetic ketoacidosis without coma associated with type 2 diabetes mellitus 05/30/2017    Diabetic ulcer of right foot associated with type 2 diabetes mellitus 06/03/2015    Disorder of kidney and ureter     Essential hypertension 06/06/2013    Group B streptococcal infection 01/13/2017    Mixed hyperlipidemia 08/12/2014    Septic arthritis of left foot 04/28/2021    Shoulder impingement 08/12/2014    Subacute osteomyelitis of right foot 01/12/2017    Streptococcus agalactiae, Actinomyces odontolyticus    Traumatic amputation of fifth toe of right foot 07/02/2015    Type 2 diabetes mellitus with diabetic neuropathy, with long-term current use of insulin 05/03/2016     Past Surgical History:   Procedure Laterality Date    COLONOSCOPY Right 1/19/2022    Procedure: COLONOSCOPY;  Surgeon: Tj Haile MD;  Location: [address];  Service: Endoscopy;  Laterality: Right;  previous order un-usable/poor prep 1/18/22  RAPID COVID test arrival 12:20  instructions handed to pt-     COLONOSCOPY N/A 3/21/2022    Procedure: COLONOSCOPY;  Surgeon: Sheng Haque MD;  Location:  [facility] (2ND FLR);  Service: Endoscopy;  Laterality: N/A;  Colonoscopy with AES for hepatix flexure polyp removal, 2nd floor  Pt is fully vaccinated-DS  Pt prescribed Plavix by Dr. Stahl in Jan. 2022, however pt states that he never started taking it-DS  3/16/22-Instructions sent via portal-DS    DEBRIDEMENT OF FOOT Left 7/14/2019    Procedure: DEBRIDEMENT, FOOT, with left 5th ray amputation;  Surgeon: Sis Hickman DPM;  Location: [facility] OR 2ND FLR;  Service: Podiatry;  Laterality: Left;    DEBRIDEMENT OF FOOT Left 7/17/2019    Procedure: DEBRIDEMENT, FOOT with leftt 5th ray partial amputation with Neox Graft;  Surgeon: Mai Burrell DPM;  Location: [facility] OR 2ND FLR;  Service: Podiatry;  Laterality: Left;  t    DEBRIDEMENT OF FOOT Bilateral 4/29/2021    Procedure: DEBRIDEMENT, FOOT;  Surgeon: Mai Burrell DPM;  Location: [facility] OR 1ST FLR;  Service: Podiatry;  Laterality: Bilateral;  Graft application    TOE AMPUTATION Right 06/05/2015    TOE AMPUTATION Right 01/19/2017    XI ROBOTIC COLECTOMY, RIGHT N/A 4/25/2022    Procedure: XI ROBOTIC COLECTOMY, RIGHT (lithotomy, eras low);  Surgeon: Erik Stout MD;  Location: [facility] OR 2ND FLR;  Service: Colon and Rectal;  Laterality: N/A;  Paruch to [place]    XI ROBOTIC COLECTOMY, RIGHT  4/25/2022    Procedure: ;  Surgeon: Erik Stout MD;  Location: [facility] OR 2ND FLR;  Service: Colon and Rectal;;       Family History       Problem Relation (Age of Onset)    Cancer Mother    Cataracts Father    Diabetes Mother, Sister    Heart disease Father, Sister    Hypertension Mother, Father, Maternal Aunt    No Known Problems Brother, Sister, Maternal Uncle, Paternal Aunt, Paternal Uncle, Maternal Grandmother, Maternal Grandfather, Paternal Grandmother, Paternal Grandfather          Tobacco Use    Smoking status: Never Smoker    Smokeless tobacco: Never Used   Substance and Sexual Activity    Alcohol use: No    Drug use: No    Sexual activity: Yes      Partners: Female     Birth control/protection: Condom     Review of Systems   Constitutional:  Negative for activity change, appetite change, chills and fever.   Respiratory:  Negative for cough, shortness of breath and wheezing.    Cardiovascular:  Negative for leg swelling.   Gastrointestinal:  Negative for nausea and vomiting.   Musculoskeletal:  Negative for arthralgias, gait problem and myalgias.   Skin:  Positive for wound. Negative for color change.   Neurological:  Negative for weakness and numbness.   Psychiatric/Behavioral:  Negative for confusion.    Objective:     Vital Signs (Most Recent):  Temp: 97.8 °F (36.6 °C) (04/26/22 1122)  Pulse: 91 (04/26/22 1122)  Resp: 20 (04/26/22 1122)  BP: (!) 90/56 (04/26/22 1122)  SpO2: 95 % (04/26/22 1122)   Vital Signs (24h Range):  Temp:  [95.7 °F (35.4 °C)-98 °F (36.7 °C)] 97.8 °F (36.6 °C)  Pulse:  [] 91  Resp:  [10-20] 20  SpO2:  [94 %-100 %] 95 %  BP: ()/() 90/56     Weight: 101.3 kg (223 lb 5.2 oz)  Body mass index is 29.46 kg/m².    Foot Exam    General  Orientation: alert and oriented to person, place, and time       Right Foot/Ankle     Inspection and Palpation  Tenderness: none   Swelling: none   Skin Exam: drainage and ulcer; skin not intact, no cellulitis, no abnormal color and no erythema     Neurovascular  Dorsalis pedis: absent  Posterior tibial: absent  Saphenous nerve sensation: diminished  Tibial nerve sensation: diminished  Superficial peroneal nerve sensation: diminished  Deep peroneal nerve sensation: diminished  Sural nerve sensation: diminished    Comments  Partial 1st and 5th ray amputated    Left Foot/Ankle      Inspection and Palpation  Tenderness: none   Swelling: none   Skin Exam: drainage and ulcer; skin not intact, no cellulitis, no abnormal color and no erythema     Neurovascular  Dorsalis pedis: absent  Posterior tibial: absent  Saphenous nerve sensation: diminished  Tibial nerve sensation: diminished  Superficial  peroneal nerve sensation: diminished  Deep peroneal nerve sensation: diminished  Sural nerve sensation: diminished    Comments  Partial 5th ray amputated    Laboratory:  CBC:   Recent Labs   Lab 04/26/22  0232   WBC 9.21   RBC 4.38*   HGB 12.4*   HCT 40.1      MCV 92   MCH 28.3   MCHC 30.9*     CRP: No results for input(s): CRP in the last 168 hours.  ESR: No results for input(s): SEDRATE in the last 168 hours.  Wound Cultures:   Recent Labs   Lab 12/10/21  1140 02/04/22  1135   LABAERO PSEUDOMONAS AERUGINOSA  Moderate  * PROTEUS PENNERI  Many  *  ENTEROCOCCUS FAECALIS  Few  *  STENOTROPHOMONAS (X.) MALTOPHILIA  Few  *     Microbiology Results (last 7 days)       ** No results found for the last 168 hours. **          Specimen (24h ago, onward)                 Start     Ordered    04/25/22 1535  Specimen to Pathology, Surgery Gastrointestinal tract  Once        Comments: Pre-op Diagnosis: Colon adenocarcinoma [C18.9]Procedure(s):XI ROBOTIC COLECTOMY, RIGHT (lithotomy, eras low) Number of specimens: 1Name of specimens:1.) right colon- permanent     References:    Click here for ordering Quick Tip   Question Answer Comment   Procedure Type: Gastrointestinal tract    Specimen Class: Routine/Screening    Release to patient Immediate        04/25/22 1537                    Diagnostic Results:  I have reviewed all pertinent imaging results/findings within the past 24 hours.        Clinical Findings:  Right hallux and 5th ray amputated, remains well healed. Plantar ulceration along the 3rd MTPJ measuring 0.2x0.6x0.1cm. Granular wound base, hyperkeratotic periwound, No probe to bone, scant serous drainage. No erythema, no crepitus or fluctuance. No malodor.     Left plantar proximal midfoot wound measuring 2.2x1.5x0.4 cm. Macerated periwound. Granular wound base, no probe to bone, no undermining. Moderate serous drainage. No purulence, no crepitus or fluctuance, no erythema.    Left plantar distal wound measuring  1.0x1.0x0.3 cm. Granular wound base, no probe to bone, no undermining. Moderate serous drainage. No purulence, no crepitus or fluctuance, no erythema.

## 2022-04-26 NOTE — ASSESSMENT & PLAN NOTE
Bilateral plantar foot ulcers debrided at bedside. No acute signs of infection    Continue local wound care. Nursing wound care orders placed  Weightbear as tolerated in tall orthopedic boot left foot. No weightbearing restrictions right foot  Patient to follow up with Dr. Kristen NJ on 4/29/2022 in wound care clinic. He already has an appointment.  If patient still admitted, podiatry will perform wound care on a weekly basis.   Podiatry will continue to follow

## 2022-04-26 NOTE — PLAN OF CARE
POC established and functional mobility goals were created to help pt return to PLOF. Will be reassessed as appropriate to measure pt progress.    Problem: Physical Therapy  Goal: Physical Therapy Goal  Description: Goals to be met by: 5/10/22     Patient will increase functional independence with mobility by performin. Supine to sit with [place]  2. Sit to supine with [place]  3. Sit to stand transfer with Modified [place] using LRAD  4. Bed to chair transfer with Modified [place] using LRAD  5. Gait  x 100 feet with Supervision using LRAD.   6. Lower extremity exercise program x15 reps per handout, with independence    Outcome: Ongoing, Progressing

## 2022-04-26 NOTE — CONSULTS
[facility]  Endocrinology  Diabetes Consult Note    Consult Requested by: Erik Stout MD   Reason for admit: Colon adenocarcinoma    HISTORY OF PRESENT ILLNESS:  Reason for Consult: Management of T2DM, Hyperglycemia     Surgical Procedure and Date:   4/25/22  XI ROBOTIC COLECTOMY, RIGHT (lithotomy, eras low) (N/A)     Diabetes diagnosis year: > 10 years ago    Home Diabetes Medications:  Levemir 26 units q HS, Novolog 8 units TID with meals and correction scale (150-200/+2), Metformin 1000 mg BID, and Trulicity 1.5 mg injection every 7 days (reports he has stopped taking the Trulicity x 1 month)     How often checking glucose at home?  Once daily in the morning    BG readings on regimen: recently has been > 200  Hypoglycemia on the regimen?  No  Missed doses on regimen?  Yes, frequently skips Novolog (reports does not eat 3 meals daily) and stopped Trulicity     Diabetes Complications include:     Hyperglycemia, Diabetic retinopathy , Diabetic peripheral neuropathy , Foot ulcer  , and Amputation status    Complicating diabetes co morbidities:   HTN, HLD, PVD, chronic LE wounds, previous DKA       HPI:   Patient is a 51 y.o. male with a diagnosis of HTN, DM, PVD, and adenocarcinoma of the colon at the hepatic flexure. Patient has chronic L foot wound, currently being seen by podiatry. Stress test 2/28 shows EF 50% and normal myocardial perfusion with no chest pain or arrhythmias. Patient s/p the above procedure(s). Endocrinology consulted for management of T2DM.     Lab Results   Component Value Date    HGBA1C 8.1 (H) 09/15/2021           Interval HPI:   Overnight events: Remains in [facility]. POD 1. BG above goal ranges on current SQ insulin regimen. Diet Clear liquid (no sugar)/Bariatric  Eating:    clear liquids   Nausea: No  Hypoglycemia and intervention: No  Fever: No  TPN and/or TF: No  If yes, type of TF/TPN and rate: n/a    PMH, PSH, FH, SH reviewed     ROS:  Constitutional: Negative for weight  changes.  Eyes: Negative for visual disturbance.  Respiratory: Negative for cough.   Cardiovascular: Negative for chest pain.  Gastrointestinal: Negative for nausea.  Endocrine: Negative for polyuria, polydipsia.  Musculoskeletal: Negative for back pain.  Skin: Negative for rash.  Neurological: Negative for syncope.  Psychiatric/Behavioral: Negative for depression.    Review of Systems    Current Medications and/or Treatments Impacting Glycemic Control  Immunotherapy:    Immunosuppressants       None          Steroids:   Hormones (From admission, onward)                None          Pressors:    Autonomic Drugs (From admission, onward)                None          Hyperglycemia/Diabetes Medications:   Antihyperglycemics (From admission, onward)                Start     Stop Route Frequency Ordered    04/25/22 2100  insulin detemir U-100 pen 12 Units         -- SubQ Nightly 04/25/22 1626    04/25/22 1726  insulin aspart U-100 pen 1-10 Units         -- SubQ Before meals & nightly PRN 04/25/22 1626             PHYSICAL EXAMINATION:  Vitals:    04/26/22 0734   BP: 130/72   Pulse: 85   Resp: 16   Temp: 98 °F (36.7 °C)     Body mass index is 29.46 kg/m².    Physical Exam  Constitutional: Well developed, well nourished, NAD.  ENT: External ears no masses with nose patent; normal hearing.  Neck: Supple; trachea midline.  Cardiovascular: Normal heart sounds, no LE edema. DP +2 bilaterally.  Lungs: Normal effort; lungs anterior bilaterally clear to auscultation.  Abdomen: Soft, no masses, no hernias. Dressings in place.   MS: No clubbing or cyanosis of nails noted; unable to assess gait.Dressing on L foot.   Skin: No rashes, lesions, or ulcers; no nodules. Injection sites are ok. No lipo hypertropthy or atrophy.  Psychiatric: Good judgement and insight; normal mood and affect.  Neurological: Cranial nerves are grossly intact.   Foot: Nails in good condition, amputations noted.         Labs Reviewed and Include   Recent Labs    Lab 04/26/22  0232   *   CALCIUM 8.8      K 4.7   CO2 20*      BUN 16   CREATININE 1.2     Lab Results   Component Value Date    WBC 9.21 04/26/2022    HGB 12.4 (L) 04/26/2022    HCT 40.1 04/26/2022    MCV 92 04/26/2022     04/26/2022     No results for input(s): TSH, FREET4 in the last 168 hours.  Lab Results   Component Value Date    HGBA1C 8.1 (H) 09/15/2021       Nutritional status:   Body mass index is 29.46 kg/m².  Lab Results   Component Value Date    ALBUMIN 3.3 (L) 04/11/2022    ALBUMIN 3.7 09/15/2021    ALBUMIN 2.5 (L) 07/07/2021     No results found for: PREALBUMIN    Estimated Creatinine Clearance: 89.1 mL/min (based on SCr of 1.2 mg/dL).    Accu-Checks  Recent Labs     04/25/22  1031 04/25/22  1139 04/25/22  1610 04/25/22  2130 04/26/22  0756   POCTGLUCOSE 355* 357* 150* 251* 230*        ASSESSMENT and PLAN    * Colon adenocarcinoma  Managed per primary team  Optimize BG control    Type 2 diabetes mellitus with hyperglycemia, with long-term current use of insulin  BG goal 140-180    Increase Levemir to 16 units q HS (0.5 u/kg dosing)   Continue Moderate Dose Correction Scale  BG monitoring ac/hs  Recommend starting Novolog 5 units TID with meals (once diet advances)     ** Please call Endocrine for any BG related issues **    Discharge plans: TBD    Lab Results   Component Value Date    HGBA1C 8.1 (H) 09/15/2021           Diabetic ulcer of right midfoot associated with diabetes mellitus due to underlying condition, with fat layer exposed  Managed per primary team  Infection may increase blood glucoses      Mixed hyperlipidemia  May increase insulin resistance.             Plan discussed with patient at bedside.     Erma Jay NP  Endocrinology  [facility]

## 2022-04-26 NOTE — ASSESSMENT & PLAN NOTE
BG goal 140-180    Increase Levemir to 16 units q HS (0.5 u/kg dosing)   Continue Moderate Dose Correction Scale  BG monitoring ac/hs  Recommend starting Novolog 5 units TID with meals (once diet advances)     ** Please call Endocrine for any BG related issues **    Discharge plans: TBD    Lab Results   Component Value Date    HGBA1C 8.1 (H) 09/15/2021

## 2022-04-26 NOTE — PLAN OF CARE
Problem: Occupational Therapy  Goal: Occupational Therapy Goal  Description: Goals to be met by: 5/10/22     Patient will increase functional independence with ADLs by performing:    Feeding with [place].  UE Dressing with [place].  LE Dressing with [place].  Grooming while standing at sink with Modified [place].  Toileting from toilet with [place] for hygiene and clothing management.   Toilet transfer to toilet with Supervision.  Pt will engage in functional mobility to simulate household distances in order to maximize functional activity tolerance required for engagement in occupations of choice with supervision using LRAD.      Outcome: Ongoing, Progressing     Evaluation complete-see note for details. Goals and POC established.    RICARDO Hernandez/L  4/26/2022   Pager #: [phone]

## 2022-04-26 NOTE — ASSESSMENT & PLAN NOTE
51 year old man presenting with colon adenocarcinoma arising from sessile polyp removed in piecemeal fashion, margins positive for high-grade dysplasia now s/p robotic right colectomy on 4/25.    - Clear liquid diet, advised patient to avoid carbonated beverages today  - MM pain control  - Continue to hold home ACE, will place orders for PRN antihypertensives  - Sliding scale insulin ordered, endocrinology consult placed for hyperglycemia and diabetes management  -Holding home plavix  - Lovenox for DVT ppx  - PT/OT, non weight bearing on left foot  - Podiatry consult in for left foot wound that was present prior to admission

## 2022-04-26 NOTE — SUBJECTIVE & OBJECTIVE
Interval HPI:   Overnight events: Remains in [facility]. POD 1. BG above goal ranges on current SQ insulin regimen. Diet Clear liquid (no sugar)/Bariatric  Eating:    clear liquids   Nausea: No  Hypoglycemia and intervention: No  Fever: No  TPN and/or TF: No  If yes, type of TF/TPN and rate: n/a    PMH, PSH, FH, SH reviewed     ROS:  Constitutional: Negative for weight changes.  Eyes: Negative for visual disturbance.  Respiratory: Negative for cough.   Cardiovascular: Negative for chest pain.  Gastrointestinal: Negative for nausea.  Endocrine: Negative for polyuria, polydipsia.  Musculoskeletal: Negative for back pain.  Skin: Negative for rash.  Neurological: Negative for syncope.  Psychiatric/Behavioral: Negative for depression.    Review of Systems    Current Medications and/or Treatments Impacting Glycemic Control  Immunotherapy:    Immunosuppressants       None          Steroids:   Hormones (From admission, onward)                None          Pressors:    Autonomic Drugs (From admission, onward)                None          Hyperglycemia/Diabetes Medications:   Antihyperglycemics (From admission, onward)                Start     Stop Route Frequency Ordered    04/25/22 2100  insulin detemir U-100 pen 12 Units         -- SubQ Nightly 04/25/22 1626    04/25/22 1726  insulin aspart U-100 pen 1-10 Units         -- SubQ Before meals & nightly PRN 04/25/22 1626             PHYSICAL EXAMINATION:  Vitals:    04/26/22 0734   BP: 130/72   Pulse: 85   Resp: 16   Temp: 98 °F (36.7 °C)     Body mass index is 29.46 kg/m².    Physical Exam  Constitutional: Well developed, well nourished, NAD.  ENT: External ears no masses with nose patent; normal hearing.  Neck: Supple; trachea midline.  Cardiovascular: Normal heart sounds, no LE edema. DP +2 bilaterally.  Lungs: Normal effort; lungs anterior bilaterally clear to auscultation.  Abdomen: Soft, no masses, no hernias. Dressings in place.   MS: No clubbing or cyanosis of nails noted;  unable to assess gait.Dressing on L foot.   Skin: No rashes, lesions, or ulcers; no nodules. Injection sites are ok. No lipo hypertropthy or atrophy.  Psychiatric: Good judgement and insight; normal mood and affect.  Neurological: Cranial nerves are grossly intact.   Foot: Nails in good condition, amputations noted.

## 2022-04-26 NOTE — CONSULTS
[facility]  Podiatry  Consult Note    Patient Name: Indio Ryder Jr.  MRN: [id number]  Admission Date: 4/25/2022  Hospital Length of Stay: 1 days  Attending Physician: Erik Stout MD  Primary Care Provider: Lorena Thakur MD     Inpatient consult to Podiatry  Consult performed by: Romy Hi MD  Consult ordered by: Fadi Perkins MD        Subjective:     History of Present Illness:  51  y.o. male. with past medical history of DM2 A1c:8.1 with peripheral neuropathy, retinopathy, hx bilateral foot osteomyelitis s/p left partial 5th ray amputation, and right partial 1st and 5th ray amputation, HTN, colon adenocarcinoma now s/p robotic right colectomy on 4/25. Podiatry consulted to manage left foot ulcer. He has had a wound on the left foot for greater than 1 year, he has been seeing going to [facility] wound care center for left foot ulcer management on a weekly basis and was last seen by Dr. Kristen NJ 4/22/2022 and felt wound was improving in size and plan was to apply allograft on 4/29/2022. Patient has been ambulating in a tall orthopedic boot left foot without any difficulty. Ambulating in diabetic shoe with heat molded inserts right foot. He also has a wound on the right foot which he states started approximately 2 weeks ago. Denies any foot pain, denies NVFC.                Scheduled Meds:   acetaminophen  1,000 mg Intravenous Q8H    Followed by    acetaminophen  1,000 mg Oral Q8H    alvimopan  12 mg Oral BID    atorvastatin  80 mg Oral Daily    enoxaparin  40 mg Subcutaneous Daily    gabapentin  300 mg Oral TID    insulin detemir U-100  16 Units Subcutaneous QHS    [START ON 4/27/2022] lisinopriL  10 mg Oral Daily    mupirocin   Nasal BID    tamsulosin  0.8 mg Oral Daily     Continuous Infusions:   sodium chloride 0.9% 40 mL/hr at 04/26/22 0628     PRN Meds:dextrose 10%, dextrose 10%, glucagon (human recombinant), glucose, glucose, insulin aspart U-100, ondansetron,  oxyCODONE, oxyCODONE, sodium chloride 0.9%, traMADoL    Review of patient's allergies indicates:  No Known Allergies     Past Medical History:   Diagnosis Date    Actinomyces infection 01/17/2017    Right foot    Colon adenocarcinoma 04/12/2022    Diabetic ketoacidosis without coma associated with type 2 diabetes mellitus 05/30/2017    Diabetic ulcer of right foot associated with type 2 diabetes mellitus 06/03/2015    Disorder of kidney and ureter     Essential hypertension 06/06/2013    Group B streptococcal infection 01/13/2017    Mixed hyperlipidemia 08/12/2014    Septic arthritis of left foot 04/28/2021    Shoulder impingement 08/12/2014    Subacute osteomyelitis of right foot 01/12/2017    Streptococcus agalactiae, Actinomyces odontolyticus    Traumatic amputation of fifth toe of right foot 07/02/2015    Type 2 diabetes mellitus with diabetic neuropathy, with long-term current use of insulin 05/03/2016     Past Surgical History:   Procedure Laterality Date    COLONOSCOPY Right 1/19/2022    Procedure: COLONOSCOPY;  Surgeon: Tj Haile MD;  Location: [facility] (4TH FLR);  Service: Endoscopy;  Laterality: Right;  previous order un-usable/poor prep 1/18/22  RAPID COVID test arrival 12:20  instructions handed to pt-     COLONOSCOPY N/A 3/21/2022    Procedure: COLONOSCOPY;  Surgeon: Sheng Haque MD;  Location: [facility] (2ND FLR);  Service: Endoscopy;  Laterality: N/A;  Colonoscopy with AES for hepatix flexure polyp removal, 2nd floor  Pt is fully vaccinated-DS  Pt prescribed Plavix by Dr. Stahl in Jan. 2022, however pt states that he never started taking it-DS  3/16/22-Instructions sent via portal-DS    DEBRIDEMENT OF FOOT Left 7/14/2019    Procedure: DEBRIDEMENT, FOOT, with left 5th ray amputation;  Surgeon: Sis Hickman DPM;  Location: [facility] OR 2ND FLR;  Service: Podiatry;  Laterality: Left;    DEBRIDEMENT OF FOOT Left 7/17/2019    Procedure: DEBRIDEMENT, FOOT with leftt 5th ray  partial amputation with Neox Graft;  Surgeon: Mai Burrell DPM;  Location: [facility] OR 2ND FLR;  Service: Podiatry;  Laterality: Left;  t    DEBRIDEMENT OF FOOT Bilateral 4/29/2021    Procedure: DEBRIDEMENT, FOOT;  Surgeon: Mai Burrell DPM;  Location: [facility] OR 1ST FLR;  Service: Podiatry;  Laterality: Bilateral;  Graft application    TOE AMPUTATION Right 06/05/2015    TOE AMPUTATION Right 01/19/2017    XI ROBOTIC COLECTOMY, RIGHT N/A 4/25/2022    Procedure: XI ROBOTIC COLECTOMY, RIGHT (lithotomy, eras low);  Surgeon: Erik Stout MD;  Location: [facility] OR 2ND FLR;  Service: Colon and Rectal;  Laterality: N/A;  Paruch to Goodwin    XI ROBOTIC COLECTOMY, RIGHT  4/25/2022    Procedure: ;  Surgeon: Erik Stout MD;  Location: [facility] OR 2ND FLR;  Service: Colon and Rectal;;       Family History       Problem Relation (Age of Onset)    Cancer Mother    Cataracts Father    Diabetes Mother, Sister    Heart disease Father, Sister    Hypertension Mother, Father, Maternal Aunt    No Known Problems Brother, Sister, Maternal Uncle, Paternal Aunt, Paternal Uncle, Maternal Grandmother, Maternal Grandfather, Paternal Grandmother, Paternal Grandfather          Tobacco Use    Smoking status: Never Smoker    Smokeless tobacco: Never Used   Substance and Sexual Activity    Alcohol use: No    Drug use: No    Sexual activity: Yes     Partners: Female     Birth control/protection: Condom     Review of Systems   Constitutional:  Negative for activity change, appetite change, chills and fever.   Respiratory:  Negative for cough, shortness of breath and wheezing.    Cardiovascular:  Negative for leg swelling.   Gastrointestinal:  Negative for nausea and vomiting.   Musculoskeletal:  Negative for arthralgias, gait problem and myalgias.   Skin:  Positive for wound. Negative for color change.   Neurological:  Negative for weakness and numbness.   Psychiatric/Behavioral:  Negative for confusion.    Objective:      Vital Signs (Most Recent):  Temp: 97.8 °F (36.6 °C) (04/26/22 1122)  Pulse: 91 (04/26/22 1122)  Resp: 20 (04/26/22 1122)  BP: (!) 90/56 (04/26/22 1122)  SpO2: 95 % (04/26/22 1122)   Vital Signs (24h Range):  Temp:  [95.7 °F (35.4 °C)-98 °F (36.7 °C)] 97.8 °F (36.6 °C)  Pulse:  [] 91  Resp:  [10-20] 20  SpO2:  [94 %-100 %] 95 %  BP: ()/() 90/56     Weight: 101.3 kg (223 lb 5.2 oz)  Body mass index is 29.46 kg/m².    Foot Exam    General  Orientation: alert and oriented to person, place, and time       Right Foot/Ankle     Inspection and Palpation  Tenderness: none   Swelling: none   Skin Exam: drainage and ulcer; skin not intact, no cellulitis, no abnormal color and no erythema     Neurovascular  Dorsalis pedis: absent  Posterior tibial: absent  Saphenous nerve sensation: diminished  Tibial nerve sensation: diminished  Superficial peroneal nerve sensation: diminished  Deep peroneal nerve sensation: diminished  Sural nerve sensation: diminished    Comments  Partial 1st and 5th ray amputated    Left Foot/Ankle      Inspection and Palpation  Tenderness: none   Swelling: none   Skin Exam: drainage and ulcer; skin not intact, no cellulitis, no abnormal color and no erythema     Neurovascular  Dorsalis pedis: absent  Posterior tibial: absent  Saphenous nerve sensation: diminished  Tibial nerve sensation: diminished  Superficial peroneal nerve sensation: diminished  Deep peroneal nerve sensation: diminished  Sural nerve sensation: diminished    Comments  Partial 5th ray amputated    Laboratory:  CBC:   Recent Labs   Lab 04/26/22  0232   WBC 9.21   RBC 4.38*   HGB 12.4*   HCT 40.1      MCV 92   MCH 28.3   MCHC 30.9*     CRP: No results for input(s): CRP in the last 168 hours.  ESR: No results for input(s): SEDRATE in the last 168 hours.  Wound Cultures:   Recent Labs   Lab 12/10/21  1140 02/04/22  1135   LABAERO PSEUDOMONAS AERUGINOSA  Moderate  * PROTEUS PENNERI  Many  *  ENTEROCOCCUS  FAECALIS  Few  *  STENOTROPHOMONAS (X.) MALTOPHILIA  Few  *     Microbiology Results (last 7 days)       ** No results found for the last 168 hours. **          Specimen (24h ago, onward)                 Start     Ordered    04/25/22 1535  Specimen to Pathology, Surgery Gastrointestinal tract  Once        Comments: Pre-op Diagnosis: Colon adenocarcinoma [C18.9]Procedure(s):XI ROBOTIC COLECTOMY, RIGHT (lithotomy, eras low) Number of specimens: 1Name of specimens:1.) right colon- permanent     References:    Click here for ordering Quick Tip   Question Answer Comment   Procedure Type: Gastrointestinal tract    Specimen Class: Routine/Screening    Release to patient Immediate        04/25/22 1537                    Diagnostic Results:  I have reviewed all pertinent imaging results/findings within the past 24 hours.        Clinical Findings:  Right hallux and 5th ray amputated, remains well healed. Plantar ulceration along the 3rd MTPJ measuring 0.2x0.6x0.1cm. Granular wound base, hyperkeratotic periwound, No probe to bone, scant serous drainage. No erythema, no crepitus or fluctuance. No malodor.     Left plantar proximal midfoot wound measuring 2.2x1.5x0.4 cm. Macerated periwound. Granular wound base, no probe to bone, no undermining. Moderate serous drainage. No purulence, no crepitus or fluctuance, no erythema.    Left plantar distal wound measuring 1.0x1.0x0.3 cm. Granular wound base, no probe to bone, no undermining. Moderate serous drainage. No purulence, no crepitus or fluctuance, no erythema.            Assessment/Plan:     Diabetic ulcer of right foot associated with type 2 diabetes mellitus, with fat layer exposed  See rest of plan    Diabetic ulcer of left midfoot associated with diabetes mellitus due to underlying condition, with fat layer exposed  Bilateral plantar foot ulcers debrided at bedside. No acute signs of infection    Continue local wound care. Nursing wound care orders placed  Weightbear as  tolerated in tall orthopedic boot left foot. No weightbearing restrictions right foot  Patient to follow up with Dr. Kristen NJ on 4/29/2022 in wound care clinic. He already has an appointment.  If patient still admitted, podiatry will perform wound care on a weekly basis.   Podiatry will continue to follow         Thank you for your consult. I will follow-up with patient. Please contact us if you have any additional questions.    Romy Hi DPM  [facility]  Podiatry PGY3  Pager: [phone]  Spectra:[phone]

## 2022-04-26 NOTE — PT/OT/SLP EVAL
Physical Therapy Co-Evaluation and Co-Treatment with OT    Patient Name:  Indio Ryder Jr.   MRN:  [id number]    Recent Surgery: Procedure(s) (LRB):  XI ROBOTIC COLECTOMY, RIGHT (lithotomy, eras low) (N/A) 1 Day Post-Op    *Co-treatment with OT due to suspected patient complexity and need for two skilled therapists to ensure safe mobilization.    Recommendations:     Discharge Recommendations:  home   Discharge Equipment Recommendations: none    Barriers to discharge: None    Highest Level of Mobility: Gait 55ft  Assistance Required: CGA-min(A)    Assessment:     Indio Ryder Jr. is a 51 y.o. male admitted with a medical diagnosis of Colon adenocarcinoma. He presents with the following impairments/functional limitations:  weakness, impaired endurance, impaired sensation, impaired balance, gait instability, impaired functional mobilty, impaired self care skills, decreased lower extremity function    Pt met with HOB elevated and agreeable to PT session. Pt reports his PLOF is (I) with functional mobility using no DME. He is currently limited by above listed deficits and now requires CGA-min(a) for gait training 2/2 a gait instability. Pt had 2 LOB that required min(A) to recover. PT recommends a RW trial next session to increase stability and reduce fall risk.     Pt would benefit from continued skilled acute PT 3x/wk to address above listed functional deficits, provide patient/caregiver education, reduce fall risk, and maximize (I) and safety with functional mobility. Writing therapist anticipates pt will have no post acute therapy needs following discharge.       Rehab Prognosis: Good; patient would benefit from acute skilled PT services to address these deficits and reach maximum level of function.      Plan:     During this hospitalization, patient to be seen 3 x/week to address the identified rehab impairments via gait training, therapeutic exercises, therapeutic activities, neuromuscular re-education and  "progress toward the following goals:    · Plan of Care Expires:  05/26/22    This plan of care has been discussed with the patient/caregiver, who was included in its development and is in agreement with the identified goals and treatment plan.     Subjective     Communicated with RN prior to session.  Patient agreeable to participate.     Chief Complaint: Colon adenocarcinoma   Patient/Family Comments/goals: None stated    Pain/Comfort:  · Pain Rating 1: 0/10  · Pain Rating Post-Intervention 1: 0/10    Patients cultural, spiritual, Uatsdin conflicts given the current situation: no    Patient's living environment is as follows:  Living Environment: Pt lives with sister in [facility] with 1 EARLINE. Bathroom set-up: tub/shower combo  Prior Level of Function: independent with mobility and ADLs  DME used: none  DME owned (not currently used): rolling walker, single point cane and axillary crutches  Upon discharge, patient will have assistance from: Sister    Objective:     Patient found HOB elevated with peripheral IV  upon PT entry to room.    General Precautions: Standard, fall   Orthopedic Precautions:LLE weight bearing as tolerated (in CAM boot)   Braces:  (CAM boot)   BP (!) 90/56 (BP Location: Left arm, Patient Position: Sitting)   Pulse 91   Temp 97.8 °F (36.6 °C) (Oral)   Resp 20   Ht 6' 1" (1.854 m)   Wt 101.3 kg (223 lb 5.2 oz)   SpO2 95%   BMI 29.46 kg/m²   Oxygen Device: room air      Exams:     Cognition:  o Patient is oriented to Person, Place, Time, Situation, follows commands 100% of the time     Edema: None present     Postural examination/scapula alignment: Rounded shoulder and Head forward     Lower Extremity Range of Motion:  o Right Lower Extremity: WNL  o Left Lower Extremity: WNL     Lower Extremity Strength:      Iliopsoas Quadriceps Knee  Flexion (HS) Tibialis  anterior Gastro- cnemius EHL   R LE 4+/5 5/5 5/5 4+/5 4+/5 NT   L LE 4+/5 5/5 5/5 4+/5 4+/5 NT       Sensation:   o Light touch " sensation: Impaired LLE  and Impaired RLE     Functional Mobility:    Bed Mobility:  · Supine to Sit: Stand-by Assistance on R side of bed  · Scooting anteriorly to EOB to plant feet on floor: Stand-by Assistance    Transfers:   · Sit to Stand Transfer: Contact Guard Assistance  from EOB with no AD   · Bed to Chair: Contact Guard Assistance with no AD via stand step t/f             Gait:  · Patient received gait training in hallway 55 feet with Contact Guard Assistance and Minimal Assistance and no AD  · Gait Assessment: occasional unsteady gait, decreased step length, narrow base of support and decreased tommy  · Gait Pattern Observed: Step-through reciprocal gait, CAM boot donned on L LE  · Comments: All lines remained intact throughout ambulation trial, mask donned for out of room ambulation. Pt demos increased lateral postural sway and had 2 LOB that required min(A) to correct. Discussed RW trial with pt. He refused today 2/2 fatigue    Balance:  · Static Sit:   · Supervision at EOB   · Normal: Patient able to maintain steady balance without handhold support.  · Static Stand:   · Stand-By Assist with no AD  · Good: Patient able to maintain balance without handhold support, limited postural sway  · Dynamic Stand:  · Contact-Guard Assist with no AD      Therapeutic Activities/Exercises      Patient assisted with functional mobility as noted above with an emphasis on gait training   Writing therapist discussed d/c recs, pt agreeable   Writing therapist ordered a RW to pt's room, pt instructed to ambulate with staff assist, only with CAM boot donned   Patient educated on the importance of early mobility to prevent functional decline during hospital stay   Patient was instructed to utilize staff assistance for mobility/transfers.  o Patient is appropriate to transfer with CGA and RN/PCT assist   Patient educated on PT POC and role of PT in acute care   White board updated to include patient's safest level of  mobility with staff assistance, RN also updated    AM-PAC 6 CLICK MOBILITY  Turning over in bed (including adjusting bedclothes, sheets and blankets)?: 4  Sitting down on and standing up from a chair with arms (e.g., wheelchair, bedside commode, etc.): 4  Moving from lying on back to sitting on the side of the bed?: 4  Moving to and from a bed to a chair (including a wheelchair)?: 3  Need to walk in hospital room?: 3  Climbing 3-5 steps with a railing?: 3  Basic Mobility Total Score: 21      Patient left up in chair with all lines intact, call button in reach and RN notified.      History/Goals:     PAST MEDICAL HISTORY:  Past Medical History:   Diagnosis Date    Actinomyces infection 01/17/2017    Right foot    Colon adenocarcinoma 04/12/2022    Diabetic ketoacidosis without coma associated with type 2 diabetes mellitus 05/30/2017    Diabetic ulcer of right foot associated with type 2 diabetes mellitus 06/03/2015    Disorder of kidney and ureter     Essential hypertension 06/06/2013    Group B streptococcal infection 01/13/2017    Mixed hyperlipidemia 08/12/2014    Septic arthritis of left foot 04/28/2021    Shoulder impingement 08/12/2014    Subacute osteomyelitis of right foot 01/12/2017    Streptococcus agalactiae, Actinomyces odontolyticus    Traumatic amputation of fifth toe of right foot 07/02/2015    Type 2 diabetes mellitus with diabetic neuropathy, with long-term current use of insulin 05/03/2016       Past Surgical History:   Procedure Laterality Date    COLONOSCOPY Right 1/19/2022    Procedure: COLONOSCOPY;  Surgeon: Tj Haile MD;  Location: [facility] (4TH FLR);  Service: Endoscopy;  Laterality: Right;  previous order un-usable/poor prep 1/18/22  RAPID COVID test arrival 12:20  instructions handed to pt- sm    COLONOSCOPY N/A 3/21/2022    Procedure: COLONOSCOPY;  Surgeon: Sheng Haque MD;  Location: [facility] (2ND FLR);  Service: Endoscopy;  Laterality: N/A;  Colonoscopy with AES  for hepatix flexure polyp removal, 2nd floor  Pt is fully vaccinated-DS  Pt prescribed Plavix by Dr. Stahl in 2022, however pt states that he never started taking it-DS  3/16/22-Instructions sent via portal-DS    DEBRIDEMENT OF FOOT Left 2019    Procedure: DEBRIDEMENT, FOOT, with left 5th ray amputation;  Surgeon: iSs Hickman DPM;  Location: [facility] 2ND FLR;  Service: Podiatry;  Laterality: Left;    DEBRIDEMENT OF FOOT Left 2019    Procedure: DEBRIDEMENT, FOOT with leftt 5th ray partial amputation with Neox Graft;  Surgeon: Mai Burrell DPM;  Location: [facility] 2ND FLR;  Service: Podiatry;  Laterality: Left;  t    DEBRIDEMENT OF FOOT Bilateral 2021    Procedure: DEBRIDEMENT, FOOT;  Surgeon: Mai Burrell DPM;  Location: [facility] 1ST FLR;  Service: Podiatry;  Laterality: Bilateral;  Graft application    TOE AMPUTATION Right 2015    TOE AMPUTATION Right 2017    XI ROBOTIC COLECTOMY, RIGHT N/A 2022    Procedure: XI ROBOTIC COLECTOMY, RIGHT (lithotomy, eras low);  Surgeon: Erik Stout MD;  Location: [facility] OR 2ND FLR;  Service: Colon and Rectal;  Laterality: N/A;  Paruch to Goodwin    XI ROBOTIC COLECTOMY, RIGHT  2022    Procedure: ;  Surgeon: Erik Stout MD;  Location: [facility] 2ND FLR;  Service: Colon and Rectal;;       GOALS:   Multidisciplinary Problems     Physical Therapy Goals        Problem: Physical Therapy    Goal Priority Disciplines Outcome Goal Variances Interventions   Physical Therapy Goal     PT, PT/OT Ongoing, Progressing     Description: Goals to be met by: 5/10/22     Patient will increase functional independence with mobility by performin. Supine to sit with [place]  2. Sit to supine with [place]  3. Sit to stand transfer with Modified [place] using LRAD  4. Bed to chair transfer with Modified [place] using LRAD  5. Gait  x 100 feet with Supervision using LRAD.   6. Lower extremity exercise  program x15 reps per handout, with independence                     Time Tracking:     PT Received On: 04/26/22  PT Start Time: 0829     PT Stop Time: 0851  PT Total Time (min): 22 min     Billable Minutes: Evaluation 10 and Gait Training 12      Aster Vogt, PT  04/26/2022  Pager# [phone]

## 2022-04-26 NOTE — PLAN OF CARE
Abd incisions x 3 c/d/i. Remains on IVF. Refusing po intake. BP elevated early in shift, plan to resume lisinopril in AM. Pain controlled with prn oxy10 and routine APAP. Beck w/good UOP. LLE dressing intact. No new complaints this shift.

## 2022-04-26 NOTE — HPI
51  y.o. male. with past medical history of DM2 A1c:8.1 with peripheral neuropathy, retinopathy, hx bilateral foot osteomyelitis s/p left partial 5th ray amputation, and right partial 1st and 5th ray amputation, HTN, colon adenocarcinoma now s/p robotic right colectomy on 4/25. Podiatry consulted to manage left foot ulcer. He has had a wound on the left foot for greater than 1 year, he has been seeing going to [facility] wound care center for left foot ulcer management on a weekly basis and was last seen by Dr. Kristen NJ 4/22/2022 and felt wound was improving in size and plan was to apply allograft on 4/29/2022. Patient has been ambulating in a tall orthopedic boot left foot without any difficulty. Ambulating in diabetic shoe with heat molded inserts right foot. He also has a wound on the right foot which he states started approximately 2 weeks ago. Denies any foot pain, denies NVFC.

## 2022-04-26 NOTE — ASSESSMENT & PLAN NOTE
51 year old man presenting with colon adenocarcinoma arising from sessile polyp removed in piecemeal fashion, margins positive for high-grade dysplasia now s/p robotic right colectomy on 4/25. Now with return of bowel function.    - Advance to regular diet today  - D/c fluids  - MM pain control  - Continue home medications  - Sliding scale insulin ordered, endocrinology consult placed for hyperglycemia and diabetes management  -Holding home plavix  - Lovenox for DVT ppx  - PT/OT, weight bearing on left foot with boot  - Podiatry consult in for left foot wound that was present prior to admission. Bedside debridement done yesterday  - Right arm swelling likely due to IV infiltration. Elevate arm and remove IV  - Will get post void residual today to evaluate bladder function.

## 2022-04-26 NOTE — SUBJECTIVE & OBJECTIVE
Subjective:     Interval History: No acute events overnight. Hypertensive but otherwise hemodynamically stable. Pain is well controlled. Denies nausea or vomiting but does have belching. He has not had gas or a BM yet.    Post-Op Info:  Procedure(s) (LRB):  XI ROBOTIC COLECTOMY, RIGHT (lithotomy, eras low) (N/A)   1 Day Post-Op      Medications:  Continuous Infusions:   sodium chloride 0.9% 40 mL/hr at 04/26/22 0628     Scheduled Meds:   acetaminophen  1,000 mg Intravenous Q8H    Followed by    acetaminophen  1,000 mg Oral Q8H    alvimopan  12 mg Oral BID    atorvastatin  80 mg Oral Daily    enoxaparin  40 mg Subcutaneous Daily    gabapentin  300 mg Oral TID    insulin detemir U-100  12 Units Subcutaneous QHS    lisinopriL  20 mg Oral Daily    mupirocin   Nasal BID     PRN Meds:   dextrose 10%    dextrose 10%    glucagon (human recombinant)    glucose    glucose    insulin aspart U-100    ondansetron    oxyCODONE    oxyCODONE    sodium chloride 0.9%    traMADoL        Objective:     Vital Signs (Most Recent):  Temp: 97 °F (36.1 °C) (04/26/22 0418)  Pulse: 103 (04/26/22 0418)  Resp: 20 (04/26/22 0418)  BP: (!) 189/112 (04/26/22 0418)  SpO2: 96 % (04/26/22 0418)   Vital Signs (24h Range):  Temp:  [95.7 °F (35.4 °C)-97.8 °F (36.6 °C)] 97 °F (36.1 °C)  Pulse:  [] 103  Resp:  [10-20] 20  SpO2:  [94 %-100 %] 96 %  BP: (125-189)/() 189/112     Intake/Output - Last 3 Shifts         04/24 0700  04/25 0659 04/25 0700  04/26 0659    I.V. (mL/kg)  451.7 (4.5)    IV Piggyback  2253.6    Total Intake(mL/kg)  2705.3 (26.7)    Urine (mL/kg/hr)  1500    Total Output  1500    Net  +1205.3          Stool Occurrence  0 x            Physical Exam  Constitutional:       General: He is not in acute distress.  HENT:      Head: Normocephalic and atraumatic.   Eyes:      Extraocular Movements: Extraocular movements intact.      Pupils: Pupils are equal, round, and reactive to light.   Cardiovascular:      Rate and Rhythm:  Normal rate and regular rhythm.   Pulmonary:      Effort: Pulmonary effort is normal. No respiratory distress.   Abdominal:      General: There is distension.      Palpations: Abdomen is soft.      Tenderness: There is abdominal tenderness (appropriately tender).      Comments: Incisions are clean dry and intact with dressing in place over extraction site.   Musculoskeletal:      Comments: Dressing on left foot   Skin:     General: Skin is warm and dry.      Capillary Refill: Capillary refill takes less than 2 seconds.   Neurological:      General: No focal deficit present.      Mental Status: He is alert and oriented to person, place, and time.       Significant Labs:  BMP (Last 3 Results): No results for input(s): GLU, NA, K, CL, CO2, BUN, CREATININE, CALCIUM, MG in the last 168 hours.  CBC (Last 3 Results):   Recent Labs   Lab 04/26/22  0232   WBC 9.21   RBC 4.38*   HGB 12.4*   HCT 40.1      MCV 92   MCH 28.3   MCHC 30.9*       Significant Diagnostics:  I have reviewed all pertinent imaging results/findings within the past 24 hours.

## 2022-04-26 NOTE — PLAN OF CARE
Initial Discharge Planning Case Management Assessment:     Patient admitted on 4/25  Chart reviewed   Care plan discussed with pt, care team     PCP updated in Epic: Confirmed pt's PCP on file is correct. He reports last seeing Dr. Thakur in August 2021. Would like help scheduling f/u appt upon d/c.   Baseline functioning: Independent in ADLs and ambulation   Recent changes from baseline: Ambulation difficulty d/t foot wound  HH/community service hx: Pt denies any hx HH services. States HH was recommended during a prior hospitalization but he did not receive services d/t his insurance. Pt has new insurance and is open to HH if recommended.   Hx facility placement: None  DME: None at baseline, pt states he may benefit from cane/walker while he recovers. Waiting on PT recs.   Psychosocial: Pt lives alone, has good family support. No acute psychiatric concerns or hx. No other psychosocial needs identified at this time.   Living environment: Pt has been living alone, will stay with his sister while he recovers.     Current dispo: Home w/sister vs. HH (pending PT/wound care needs)  Barriers to d/c: None  Transportation: Sister will provide    Consults following: Case Management, PT/OT, Endo  Referrals sent: None at this time    Actions completed today:   Met with pt at bedside to complete initial assessment     SW will continue to monitor and assist with any additional d/c needs as they arise.     Jaki Collado LMSW  ED  (training on 10H)  Care Management  [facility]  Ext. [phone]      [facility]  Initial Discharge Assessment       Primary Care Provider: Lorena Thakur MD    Admission Diagnosis: Colon adenocarcinoma [C18.9]  Malignant neoplasm of ascending colon [C18.2]    Admission Date: 4/25/2022  Expected Discharge Date: 4/28/2022    Discharge Barriers Identified: None    Payor: MEDICARE / Plan: MEDICARE PART A & B / Product Type: Government /     Extended Emergency Contact  Information  Primary Emergency Contact: Cynthia White  Work Phone: [phone]  Relation: Sister  Secondary Emergency Contact: Amirah Ho  Mobile Phone: [phone]  Relation: Relative    Discharge Plan A: Home with family  Discharge Plan B: Home Health      [facility]  [address]  [place] [address]  Phone: [phone] Fax: [phone]    [facility]  [address]  [address]   [facility] [address]  Phone: [phone] Fax: [phone]      Initial Assessment (most recent)     Adult Discharge Assessment - 04/26/22 1348        Discharge Assessment    Assessment Type Discharge Planning Assessment     Confirmed/corrected address, phone number and insurance Yes     Confirmed Demographics Correct on Facesheet     Source of Information patient     When was your last doctors appointment? 08/01/21     Does patient/caregiver understand observation status Yes     Communicated FRANCISCO with patient/caregiver Yes     Reason For Admission L foot wound     Lives With alone     Do you expect to return to your current living situation? No     Do you have help at home or someone to help you manage your care at home? Yes     Who are your caregiver(s) and their phone number(s)? Sister- Cynthia White, [phone]     Prior to hospitilization cognitive status: Alert/Oriented;No Deficits     Current cognitive status: Alert/Oriented;No Deficits     Walking or Climbing Stairs Difficulty ambulation difficulty, requires equipment     Mobility Management May need cane/walker at d/c     Dressing/Bathing Difficulty none     Home Accessibility wheelchair accessible     Home Layout Able to live on 1st floor     Equipment Currently Used at Home none     Readmission within 30 days? No     Patient currently being followed by outpatient case management? No     Do you currently have service(s) that help you manage your care at home? No     Do you take prescription medications? Yes     Do you  have prescription coverage? Yes     Coverage Medicare A&B     Do you have any problems affording any of your prescribed medications? No     Is the patient taking medications as prescribed? yes     Who is going to help you get home at discharge? - Cynthia White, [phone]     How do you get to doctors appointments? family or friend will provide     Are you on dialysis? No     Do you take coumadin? No     Discharge Plan A Home with family     Discharge Plan B Home Health     Discharge Plan discussed with: Patient     Discharge Barriers Identified None

## 2022-04-26 NOTE — PROCEDURES
"Indio Ryder Jr. is a 51 y.o. male patient.    Temp: 97.8 °F (36.6 °C) (04/26/22 1122)  Pulse: 91 (04/26/22 1122)  Resp: 20 (04/26/22 1122)  BP: (!) 90/56 (04/26/22 1122)  SpO2: 95 % (04/26/22 1122)  Weight: 101.3 kg (223 lb 5.2 oz) (04/25/22 1018)  Height: 6' 1" (185.4 cm) (04/25/22 1018)       Debridement    Date/Time: 4/26/2022 2:18 PM  Performed by: Mai Burrell DPM  Authorized by: Mai Burrell DPM       Wound Details:    Location:  Left foot    Location:  Left Midfoot    Type of Debridement:  Excisional       Length (cm):  1       Area (sq cm):  1       Width (cm):  1       Percent Debrided (%):  100       Depth (cm):  0.3       Total Area Debrided (sq cm):  1    Depth of debridement:  Subcutaneous tissue    Tissue debrided:  Epidermis, Dermis and Subcutaneous    Devitalized tissue debrided:  Biofilm, Callus, Exudate, Fibrin and Slough    Instruments:  Curette    2nd Wound Details:     Location:  Left foot    Location:  Left Plantar    Location:  Left Plantar    Type of Debridement:  Excisional       Length (cm):  2.2       Area (sq cm):  3.3       Width (cm):  1.5       Percent Debrided (%):  100       Depth (cm):  0.4       Total Area Debrided (sq cm):  3.3    Depth of debridement:  Subcutaneous tissue    Tissue debrided:  Epidermis, Dermis and Subcutaneous    Devitalized tissue debrided:  Biofilm, Callus, Exudate, Fibrin and Slough    Instruments:  Curette    3rd Wound Details:     Location:  Right foot    Location:  Right 3rd Metatarsal Head    Location:  Right 3rd Metatarsal Head    Type of Debridement:  Excisional       Length (cm):  0.2       Area (sq cm):  0.12       Width (cm):  0.6       Percent Debrided (%):  100       Depth (cm):  0.1       Total Area Debrided (sq cm):  0.12    Depth of debridement:  Subcutaneous tissue    Tissue debrided:  Subcutaneous, Epidermis and Dermis    Devitalized tissue debrided:  Biofilm, Callus, Exudate, Fibrin and Slough    Instruments:  " Curette    Bleeding:  Minimal  Hemostasis Achieved: Yes    Method Used:  Pressure  Patient tolerance:  Patient tolerated the procedure well with no immediate complications     [place] applied to wounds, followed by hydrafera blue ready, offloading felt pad to left foot, cast padding, coban.    Right foot [place], hydrafera blue ready, mepilex border.         4/26/2022

## 2022-04-26 NOTE — HPI
Reason for Consult: Management of T2DM, Hyperglycemia     Surgical Procedure and Date:   4/25/22  XI ROBOTIC COLECTOMY, RIGHT (lithotomy, eras low) (N/A)     Diabetes diagnosis year: > 10 years ago    Home Diabetes Medications:  Levemir 26 units q HS, Novolog 8 units TID with meals and correction scale (150-200/+2), Metformin 1000 mg BID, and Trulicity 1.5 mg injection every 7 days (reports he has stopped taking the Trulicity x 1 month)     How often checking glucose at home?  Once daily in the morning    BG readings on regimen: recently has been > 200  Hypoglycemia on the regimen?  No  Missed doses on regimen?  Yes, frequently skips Novolog (reports does not eat 3 meals daily) and stopped Trulicity     Diabetes Complications include:     Hyperglycemia, Diabetic retinopathy , Diabetic peripheral neuropathy , Foot ulcer  , and Amputation status    Complicating diabetes co morbidities:   HTN, HLD, PVD, chronic LE wounds, previous DKA       HPI:   Patient is a 51 y.o. male with a diagnosis of HTN, DM, PVD, and adenocarcinoma of the colon at the hepatic flexure. Patient has chronic L foot wound, currently being seen by podiatry. Stress test 2/28 shows EF 50% and normal myocardial perfusion with no chest pain or arrhythmias. Patient s/p the above procedure(s). Endocrinology consulted for management of T2DM.     Lab Results   Component Value Date    HGBA1C 8.1 (H) 09/15/2021

## 2022-04-26 NOTE — PT/OT/SLP EVAL
Occupational Therapy   Co-Evaluation/Treatment with PT    Name: Indio Ryder Jr.  MRN: [id number]  Admitting Diagnosis:  Colon adenocarcinoma  Recent Surgery: Procedure(s) (LRB):  XI ROBOTIC COLECTOMY, RIGHT (lithotomy, eras low) (N/A) 1 Day Post-Op    Recommendations:     Discharge Recommendations: home  Discharge Equipment Recommendations:  none  Barriers to discharge:  None    Assessment:     Indio Ryder Jr. is a 51 y.o. male with a medical diagnosis of Colon adenocarcinoma.  He presents with good motivation and participation. Pt tolerated session well, with minor instability during mobility putting him at risk for falls. Pt is currently performing functional tasks below baseline. Performance deficits affecting function: weakness, impaired endurance, impaired self care skills, impaired functional mobilty, gait instability, impaired balance, decreased lower extremity function, decreased safety awareness, impaired skin.  Pt would benefit from skilled OT services in order to maximize independence with ADLs and facilitate safe discharge. Anticipating pt will be able to return home with no additional OT needs when medically stable.      Rehab Prognosis: Good; patient would benefit from acute skilled OT services to address these deficits and reach maximum level of function.       Plan:     Patient to be seen 3 x/week to address the above listed problems via self-care/home management, therapeutic activities, therapeutic exercises, neuromuscular re-education  · Plan of Care Expires: 05/26/22  · Plan of Care Reviewed with: patient    Subjective     Chief Complaint: pt pleasant with no c/o  Patient/Family Comments/goals: to return home    Occupational Profile:  Living Environment: Pt will be discharging to his sister's [facility] with threshold to enter. He will have a tub/shower combo to use there.  Previous level of function: independent with ADLs and mobility. Ambulating WBAT with CAM boot  Roles and Routines: drives,  does not work, spends the day watching TV  Equipment Used at Home:  crutches, axillary, cane, quad, cane, straight, walker, rolling  Assistance upon Discharge: Upon discharge, pt will have assistance from sister    Pain/Comfort:  · Pain Rating 1: 0/10    Patients cultural, spiritual, Hinduism conflicts given the current situation: no    Objective:     Communicated with: RN and PT prior to session.  Patient found HOB elevated with peripheral IV upon OT entry to room.    General Precautions: Standard, fall   Orthopedic Precautions:LLE weight bearing as tolerated (with CAM boot)   Braces:  (CAM boot)  Respiratory Status: Room air    Occupational Performance:    Bed Mobility:    · Patient completed Scooting/Bridging with stand by assistance  · Patient completed Supine to Sit with stand by assistance with HOB elevated    Functional Mobility/Transfers:  · Patient completed Sit <> Stand Transfer with contact guard assistance  with  no assistive device   · Functional Mobility: Pt ambulated <household distance with min-CGA and no AD in order to maximize functional activity tolerance and standing balance required for engagement in occupations of choice.    · 2 LOB requiring min A to recover  · Instability and lateral sway    Activities of Daily Living:  · Grooming: contact guard assistance to perform oral care and facial hygiene standing at sink  · Lower Body Dressing: stand by assistance to don tennis shoe to R LE and maximal assistance to don CAM boot to L LE seated EOB prior to mobility    Cognitive/Visual Perceptual:  Cognitive/Psychosocial Skills:     -       Oriented to: Person, Place, Time and Situation   -       Follows Commands/attention:Follows multistep  commands  -       Communication: clear/fluent  -       Memory: No Deficits noted  -       Safety awareness/insight to disability: impaired   -       Mood/Affect/Coping skills/emotional control: Appropriate to situation    Physical Exam:  Upper Extremity Range of  Motion:     -       Right Upper Extremity: WFL  -       Left Upper Extremity: WFL  Upper Extremity Strength:    -       Right Upper Extremity: WFL  -       Left Upper Extremity: WFL   Strength:    -       Right Upper Extremity: WFL  -       Left Upper Extremity: WFL  Fine Motor Coordination:    -       Intact    AMPAC 6 Click ADL:  AMPAC Total Score: 19    Treatment & Education:  -Therapist provided facilitation and instruction of proper body mechanics, energy conservation, and fall prevention strategies during tasks listed above.  -Pt educated on role of OT, POC and goals for therapy  -Pt educated on importance of OOB activities with staff member assistance and sitting OOB majority of the day.   -Pt verbalized understanding. Pt expressed no further concerns/questions  -Whiteboard updated  Evaluation completed with PT to best establish plan of care for acute setting.   -Pt encouraged to use RW to mobilize with nsg and upon discharge given 2 LOB during session; pt with reluctance, and displayed poor insight to deficits  Education:    Patient left up in chair with all lines intact and call button in reach    GOALS:   Multidisciplinary Problems     Occupational Therapy Goals        Problem: Occupational Therapy    Goal Priority Disciplines Outcome Interventions   Occupational Therapy Goal     OT, PT/OT Ongoing, Progressing    Description: Goals to be met by: 5/10/22     Patient will increase functional independence with ADLs by performing:    Feeding with [place].  UE Dressing with [place].  LE Dressing with [place].  Grooming while standing at sink with Modified [place].  Toileting from toilet with [place] for hygiene and clothing management.   Toilet transfer to toilet with Supervision.  Pt will engage in functional mobility to simulate household distances in order to maximize functional activity tolerance required for engagement in occupations of choice with supervision using LRAD.                        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    Right foot    Colon adenocarcinoma 04/12/2022    Diabetic ketoacidosis without coma associated with type 2 diabetes mellitus 05/30/2017    Diabetic ulcer of right foot associated with type 2 diabetes mellitus 06/03/2015    Disorder of kidney and ureter     Essential hypertension 06/06/2013    Group B streptococcal infection 01/13/2017    Mixed hyperlipidemia 08/12/2014    Septic arthritis of left foot 04/28/2021    Shoulder impingement 08/12/2014    Subacute osteomyelitis of right foot 01/12/2017    Streptococcus agalactiae, Actinomyces odontolyticus    Traumatic amputation of fifth toe of right foot 07/02/2015    Type 2 diabetes mellitus with diabetic neuropathy, with long-term current use of insulin 05/03/2016       Past Surgical History:   Procedure Laterality Date    COLONOSCOPY Right 1/19/2022    Procedure: COLONOSCOPY;  Surgeon: Tj Haile MD;  Location: [facility] (4TH FLR);  Service: Endoscopy;  Laterality: Right;  previous order un-usable/poor prep 1/18/22  RAPID COVID test arrival 12:20  instructions handed to pt- sm    COLONOSCOPY N/A 3/21/2022    Procedure: COLONOSCOPY;  Surgeon: Sheng Haque MD;  Location: [facility] (2ND FLR);  Service: Endoscopy;  Laterality: N/A;  Colonoscopy with AES for hepatix flexure polyp removal, 2nd floor  Pt is fully vaccinated-DS  Pt prescribed Plavix by Dr. Stahl in Jan. 2022, however pt states that he never started taking it-DS  3/16/22-Instructions sent via portal-DS    DEBRIDEMENT OF FOOT Left 7/14/2019    Procedure: DEBRIDEMENT, FOOT, with left 5th ray amputation;  Surgeon: Sis Hickman DPM;  Location: [facility] OR 2ND FLR;  Service: Podiatry;  Laterality: Left;    DEBRIDEMENT OF FOOT Left 7/17/2019    Procedure: DEBRIDEMENT, FOOT with leftt 5th ray partial amputation with Neox Graft;  Surgeon: Mai Burrell DPM;  Location: [facility] OR  2ND FLR;  Service: Podiatry;  Laterality: Left;  t    DEBRIDEMENT OF FOOT Bilateral 4/29/2021    Procedure: DEBRIDEMENT, FOOT;  Surgeon: Mai Burrell DPM;  [facility] 1ST F[facility];  Service: Podiatry;  Laterality: Bilateral;  Graft application    TOE AMPUTATION Right 06/05/2015    TOE AMPUTATION Right 01/19/2017    XI ROBOTIC COLECTOMY, RIGHT N/A 4/25/2022    Procedure: XI ROBOTIC COLECTOMY, RIGHT (lithotomy, eras low);  Surgeon: Erik Stout MD;  [facility];  Service: Colon and Rectal;  Laterality: N/A;  Paruch to [place]    XI ROBOTIC COLECTOMY, RIGHT  4/25/2022    Procedure: ;  Surgeon: Erik Stout MD;  [facility];  Service: Colon and Rectal;;       Time Tracking:     OT Date of Treatment: 04/26/22  OT Start Time: 0829  OT Stop Time: 0851  OT Total Time (min): 22 min    Billable Minutes:Evaluation 12  Self Care/Home Management 10    4/26/2022

## 2022-04-27 LAB
ANION GAP SERPL CALC-SCNC: 9 MMOL/L (ref 8–16)
BUN SERPL-MCNC: 15 MG/DL (ref 6–20)
CALCIUM SERPL-MCNC: 8.7 MG/DL (ref 8.7–10.5)
CHLORIDE SERPL-SCNC: 107 MMOL/L (ref 95–110)
CO2 SERPL-SCNC: 24 MMOL/L (ref 23–29)
CREAT SERPL-MCNC: 1.1 MG/DL (ref 0.5–1.4)
ERYTHROCYTE [DISTWIDTH] IN BLOOD BY AUTOMATED COUNT: 13.5 % (ref 11.5–14.5)
EST. GFR  (AFRICAN AMERICAN): >60 ML/MIN/1.73 M^2
EST. GFR  (NON AFRICAN AMERICAN): >60 ML/MIN/1.73 M^2
GLUCOSE SERPL-MCNC: 215 MG/DL (ref 70–110)
HCT VFR BLD AUTO: 32.3 % (ref 40–54)
HGB BLD-MCNC: 10.4 G/DL (ref 14–18)
MCH RBC QN AUTO: 28.6 PG (ref 27–31)
MCHC RBC AUTO-ENTMCNC: 32.2 G/DL (ref 32–36)
MCV RBC AUTO: 89 FL (ref 82–98)
PLATELET # BLD AUTO: 162 K/UL (ref 150–450)
PMV BLD AUTO: 11.7 FL (ref 9.2–12.9)
POCT GLUCOSE: 174 MG/DL (ref 70–110)
POCT GLUCOSE: 174 MG/DL (ref 70–110)
POCT GLUCOSE: 178 MG/DL (ref 70–110)
POCT GLUCOSE: 182 MG/DL (ref 70–110)
POTASSIUM SERPL-SCNC: 3.6 MMOL/L (ref 3.5–5.1)
RBC # BLD AUTO: 3.64 M/UL (ref 4.6–6.2)
SODIUM SERPL-SCNC: 140 MMOL/L (ref 136–145)
WBC # BLD AUTO: 6.06 K/UL (ref 3.9–12.7)

## 2022-04-27 PROCEDURE — 99232 PR SUBSEQUENT HOSPITAL CARE,LEVL II: ICD-10-PCS | Mod: ,,, | Performed by: NURSE PRACTITIONER

## 2022-04-27 PROCEDURE — 63600175 PHARM REV CODE 636 W HCPCS: Performed by: STUDENT IN AN ORGANIZED HEALTH CARE EDUCATION/TRAINING PROGRAM

## 2022-04-27 PROCEDURE — 20600001 HC STEP DOWN PRIVATE ROOM

## 2022-04-27 PROCEDURE — 99232 SBSQ HOSP IP/OBS MODERATE 35: CPT | Mod: ,,, | Performed by: NURSE PRACTITIONER

## 2022-04-27 PROCEDURE — 25000003 PHARM REV CODE 250: Performed by: NURSE PRACTITIONER

## 2022-04-27 PROCEDURE — 80048 BASIC METABOLIC PNL TOTAL CA: CPT | Performed by: STUDENT IN AN ORGANIZED HEALTH CARE EDUCATION/TRAINING PROGRAM

## 2022-04-27 PROCEDURE — C9399 UNCLASSIFIED DRUGS OR BIOLOG: HCPCS | Performed by: NURSE PRACTITIONER

## 2022-04-27 PROCEDURE — 85027 COMPLETE CBC AUTOMATED: CPT | Performed by: STUDENT IN AN ORGANIZED HEALTH CARE EDUCATION/TRAINING PROGRAM

## 2022-04-27 PROCEDURE — 51798 US URINE CAPACITY MEASURE: CPT

## 2022-04-27 PROCEDURE — 36415 COLL VENOUS BLD VENIPUNCTURE: CPT | Performed by: STUDENT IN AN ORGANIZED HEALTH CARE EDUCATION/TRAINING PROGRAM

## 2022-04-27 PROCEDURE — 94761 N-INVAS EAR/PLS OXIMETRY MLT: CPT

## 2022-04-27 PROCEDURE — 94799 UNLISTED PULMONARY SVC/PX: CPT

## 2022-04-27 PROCEDURE — 25000003 PHARM REV CODE 250: Performed by: STUDENT IN AN ORGANIZED HEALTH CARE EDUCATION/TRAINING PROGRAM

## 2022-04-27 RX ORDER — INSULIN ASPART 100 [IU]/ML
5 INJECTION, SOLUTION INTRAVENOUS; SUBCUTANEOUS
Status: DISCONTINUED | OUTPATIENT
Start: 2022-04-27 | End: 2022-04-27

## 2022-04-27 RX ORDER — ONDANSETRON 8 MG/1
8 TABLET, ORALLY DISINTEGRATING ORAL EVERY 8 HOURS PRN
Qty: 21 TABLET | Refills: 0 | Status: SHIPPED | OUTPATIENT
Start: 2022-04-27 | End: 2023-06-02

## 2022-04-27 RX ORDER — OXYCODONE HYDROCHLORIDE 5 MG/1
5 TABLET ORAL EVERY 6 HOURS PRN
Qty: 21 TABLET | Refills: 0 | Status: SHIPPED | OUTPATIENT
Start: 2022-04-27 | End: 2022-06-06

## 2022-04-27 RX ORDER — TAMSULOSIN HYDROCHLORIDE 0.4 MG/1
0.8 CAPSULE ORAL DAILY
Qty: 28 CAPSULE | Refills: 0 | Status: SHIPPED | OUTPATIENT
Start: 2022-04-28 | End: 2023-07-13

## 2022-04-27 RX ORDER — INSULIN ASPART 100 [IU]/ML
6 INJECTION, SOLUTION INTRAVENOUS; SUBCUTANEOUS
Status: DISCONTINUED | OUTPATIENT
Start: 2022-04-27 | End: 2022-04-27

## 2022-04-27 RX ORDER — INSULIN ASPART 100 [IU]/ML
7 INJECTION, SOLUTION INTRAVENOUS; SUBCUTANEOUS
Status: DISCONTINUED | OUTPATIENT
Start: 2022-04-27 | End: 2022-04-29 | Stop reason: HOSPADM

## 2022-04-27 RX ORDER — GABAPENTIN 100 MG/1
100 CAPSULE ORAL 3 TIMES DAILY
Status: DISCONTINUED | OUTPATIENT
Start: 2022-04-27 | End: 2022-04-29 | Stop reason: HOSPADM

## 2022-04-27 RX ADMIN — MUPIROCIN: 20 OINTMENT TOPICAL at 09:04

## 2022-04-27 RX ADMIN — INSULIN ASPART 2 UNITS: 100 INJECTION, SOLUTION INTRAVENOUS; SUBCUTANEOUS at 08:04

## 2022-04-27 RX ADMIN — GABAPENTIN 100 MG: 100 CAPSULE ORAL at 08:04

## 2022-04-27 RX ADMIN — ENOXAPARIN SODIUM 40 MG: 100 INJECTION SUBCUTANEOUS at 04:04

## 2022-04-27 RX ADMIN — INSULIN ASPART 2 UNITS: 100 INJECTION, SOLUTION INTRAVENOUS; SUBCUTANEOUS at 09:04

## 2022-04-27 RX ADMIN — INSULIN ASPART 2 UNITS: 100 INJECTION, SOLUTION INTRAVENOUS; SUBCUTANEOUS at 12:04

## 2022-04-27 RX ADMIN — INSULIN ASPART 2 UNITS: 100 INJECTION, SOLUTION INTRAVENOUS; SUBCUTANEOUS at 04:04

## 2022-04-27 RX ADMIN — MUPIROCIN: 20 OINTMENT TOPICAL at 08:04

## 2022-04-27 RX ADMIN — INSULIN DETEMIR 20 UNITS: 100 INJECTION, SOLUTION SUBCUTANEOUS at 09:04

## 2022-04-27 RX ADMIN — ATORVASTATIN CALCIUM 80 MG: 20 TABLET, FILM COATED ORAL at 08:04

## 2022-04-27 RX ADMIN — GABAPENTIN 100 MG: 100 CAPSULE ORAL at 09:04

## 2022-04-27 RX ADMIN — LISINOPRIL 10 MG: 10 TABLET ORAL at 08:04

## 2022-04-27 RX ADMIN — INSULIN ASPART 7 UNITS: 100 INJECTION, SOLUTION INTRAVENOUS; SUBCUTANEOUS at 04:04

## 2022-04-27 RX ADMIN — INSULIN ASPART 5 UNITS: 100 INJECTION, SOLUTION INTRAVENOUS; SUBCUTANEOUS at 08:04

## 2022-04-27 RX ADMIN — ACETAMINOPHEN 1000 MG: 500 TABLET ORAL at 09:04

## 2022-04-27 RX ADMIN — INSULIN ASPART 5 UNITS: 100 INJECTION, SOLUTION INTRAVENOUS; SUBCUTANEOUS at 12:04

## 2022-04-27 RX ADMIN — ALVIMOPAN 12 MG: 12 CAPSULE ORAL at 08:04

## 2022-04-27 RX ADMIN — ALVIMOPAN 12 MG: 12 CAPSULE ORAL at 09:04

## 2022-04-27 RX ADMIN — TAMSULOSIN HYDROCHLORIDE 0.8 MG: 0.4 CAPSULE ORAL at 08:04

## 2022-04-27 NOTE — PROGRESS NOTES
[facility]  Endocrinology  Progress Note    Admit Date: 2022     Reason for Consult: Management of T2DM, Hyperglycemia     Surgical Procedure and Date:   22  XI ROBOTIC COLECTOMY, RIGHT (lithotomy, eras low) (N/A)     Diabetes diagnosis year: > 10 years ago    Home Diabetes Medications:  Levemir 26 units q HS, Novolog 8 units TID with meals and correction scale (150-200/+2), Metformin 1000 mg BID, and Trulicity 1.5 mg injection every 7 days (reports he has stopped taking the Trulicity x 1 month)     How often checking glucose at home?  Once daily in the morning    BG readings on regimen: recently has been > 200  Hypoglycemia on the regimen?  No  Missed doses on regimen?  Yes, frequently skips Novolog (reports does not eat 3 meals daily) and stopped Trulicity     Diabetes Complications include:     Hyperglycemia, Diabetic retinopathy , Diabetic peripheral neuropathy , Foot ulcer  , and Amputation status    Complicating diabetes co morbidities:   HTN, HLD, PVD, chronic LE wounds, previous DKA       HPI:   Patient is a 51 y.o. male with a diagnosis of HTN, DM, PVD, and adenocarcinoma of the colon at the hepatic flexure. Patient has chronic L foot wound, currently being seen by podiatry. Stress test  shows EF 50% and normal myocardial perfusion with no chest pain or arrhythmias. Patient s/p the above procedure(s). Endocrinology consulted for management of T2DM.     Lab Results   Component Value Date    HGBA1C 8.1 (H) 09/15/2021           Interval HPI:   Overnight events: Remains in [facility]. POD 2. BG at or slightly above goal ranges on current SQ insulin reigmen. Diet progressed. Diet diabetic [facility]; 2000 Calorie; Low Fiber, Consistent Carbohydrate  Eatin%  Nausea: No  Hypoglycemia and intervention: No  Fever: No  TPN and/or TF: No  If yes, type of TF/TPN and rate: n/a    /64 (BP Location: Left arm, Patient Position: Lying)   Pulse 96   Temp 97.9 °F (36.6 °C) (Oral)   Resp  "18   Ht 6' 1" (1.854 m)   Wt 101.3 kg (223 lb 5.2 oz)   SpO2 (!) 94%   BMI 29.46 kg/m²     Labs Reviewed and Include    Recent Labs   Lab 04/27/22  0247   *   CALCIUM 8.7      K 3.6   CO2 24      BUN 15   CREATININE 1.1     Lab Results   Component Value Date    WBC 6.06 04/27/2022    HGB 10.4 (L) 04/27/2022    HCT 32.3 (L) 04/27/2022    MCV 89 04/27/2022     04/27/2022     No results for input(s): TSH, FREET4 in the last 168 hours.  Lab Results   Component Value Date    HGBA1C 8.1 (H) 09/15/2021       Nutritional status:   Body mass index is 29.46 kg/m².  Lab Results   Component Value Date    ALBUMIN 3.3 (L) 04/11/2022    ALBUMIN 3.7 09/15/2021    ALBUMIN 2.5 (L) 07/07/2021     No results found for: PREALBUMIN    Estimated Creatinine Clearance: 97.2 mL/min (based on SCr of 1.1 mg/dL).    Accu-Checks  Recent Labs     04/25/22  1031 04/25/22  1139 04/25/22  1610 04/25/22  2130 04/26/22  0756 04/26/22  1625 04/26/22  2102 04/27/22  0808 04/27/22  1202   POCTGLUCOSE 355* 357* 150* 251* 230* 240* 242* 178* 182*       Current Medications and/or Treatments Impacting Glycemic Control  Immunotherapy:    Immunosuppressants       None          Steroids:   Hormones (From admission, onward)                None          Pressors:    Autonomic Drugs (From admission, onward)                None          Hyperglycemia/Diabetes Medications:   Antihyperglycemics (From admission, onward)                Start     Stop Route Frequency Ordered    04/27/22 2100  insulin detemir U-100 pen 18 Units         -- SubQ Nightly 04/27/22 1358    04/27/22 1645  insulin aspart U-100 pen 6 Units         -- SubQ 3 times daily with meals 04/27/22 1358    04/25/22 1726  insulin aspart U-100 pen 1-10 Units         -- SubQ Before meals & nightly PRN 04/25/22 1626            ASSESSMENT and PLAN    * Colon adenocarcinoma  Managed per primary team  Optimize BG control    Type 2 diabetes mellitus with hyperglycemia, with long-term " current use of insulin  BG goal 140-180    Increase Levemir to 20 units q HS (20% dose increase; fasting BG above goal ranges)   Increase Novolog to 7 units  TID with meals (basal/prandial match)   Continue Moderate Dose Correction Scale  BG monitoring ac/hs    ** Please call Endocrine for any BG related issues **    Discharge plans: TBD    Lab Results   Component Value Date    HGBA1C 8.1 (H) 09/15/2021           Diabetic ulcer of left midfoot associated with diabetes mellitus due to underlying condition, with fat layer exposed  Managed per primary team  Infection may increase blood glucoses      Mixed hyperlipidemia  May increase insulin resistance.             Erma Jay, NP  Endocrinology  [facility]

## 2022-04-27 NOTE — SUBJECTIVE & OBJECTIVE
"Interval HPI:   Overnight events: Remains in [facility]. POD 2. BG at or slightly above goal ranges on current SQ insulin reigmen. Diet progressed. Diet diabetic [facility];  Calorie; Low Fiber, Consistent Carbohydrate  Eatin%  Nausea: No  Hypoglycemia and intervention: No  Fever: No  TPN and/or TF: No  If yes, type of TF/TPN and rate: n/a    /64 (BP Location: Left arm, Patient Position: Lying)   Pulse 96   Temp 97.9 °F (36.6 °C) (Oral)   Resp 18   Ht 6' 1" (1.854 m)   Wt 101.3 kg (223 lb 5.2 oz)   SpO2 (!) 94%   BMI 29.46 kg/m²     Labs Reviewed and Include    Recent Labs   Lab 22  0247   *   CALCIUM 8.7      K 3.6   CO2 24      BUN 15   CREATININE 1.1     Lab Results   Component Value Date    WBC 6.06 2022    HGB 10.4 (L) 2022    HCT 32.3 (L) 2022    MCV 89 2022     2022     No results for input(s): TSH, FREET4 in the last 168 hours.  Lab Results   Component Value Date    HGBA1C 8.1 (H) 09/15/2021       Nutritional status:   Body mass index is 29.46 kg/m².  Lab Results   Component Value Date    ALBUMIN 3.3 (L) 2022    ALBUMIN 3.7 09/15/2021    ALBUMIN 2.5 (L) 2021     No results found for: PREALBUMIN    Estimated Creatinine Clearance: 97.2 mL/min (based on SCr of 1.1 mg/dL).    Accu-Checks  Recent Labs     22  1031 22  1139 22  1610 22  2130 22  0756 22  1625 22  2102 22  0808 22  1202   POCTGLUCOSE 355* 357* 150* 251* 230* 240* 242* 178* 182*       Current Medications and/or Treatments Impacting Glycemic Control  Immunotherapy:    Immunosuppressants       None          Steroids:   Hormones (From admission, onward)                None          Pressors:    Autonomic Drugs (From admission, onward)                None          Hyperglycemia/Diabetes Medications:   Antihyperglycemics (From admission, onward)                Start     Stop Route Frequency Ordered    " 04/27/22 2100  insulin detemir U-100 pen 18 Units         -- SubQ Nightly 04/27/22 1358    04/27/22 1645  insulin aspart U-100 pen 6 Units         -- SubQ 3 times daily with meals 04/27/22 1358    04/25/22 1726  insulin aspart U-100 pen 1-10 Units         -- SubQ Before meals & nightly PRN 04/25/22 1624

## 2022-04-27 NOTE — PROGRESS NOTES
[facility]  Colorectal Surgery  Progress Note    Patient Name: Indio Ryder Jr.  MRN: [id number]  Admission Date: 4/25/2022  Hospital Length of Stay: 2 days  Attending Physician: Erik Stout MD    Subjective:     Interval History: No acute events overnight. Low grade tachycardia that has resolved this morning. Otherwise hemodynamically stable. Tolerating clears without nausea or vomiting. Has not had any more bleching. Voided yesterday evening and this morning, although he feels like he still has some urine in his bladder after voiding. He also complains of a swollen arm this morning. Pain is well controlled. Passing flatus this morning.    Post-Op Info:  Procedure(s) (LRB):  XI ROBOTIC COLECTOMY, RIGHT (lithotomy, eras low) (N/A)   2 Days Post-Op      Medications:  Continuous Infusions:  Scheduled Meds:   acetaminophen  1,000 mg Oral Q8H    alvimopan  12 mg Oral BID    atorvastatin  80 mg Oral Daily    enoxaparin  40 mg Subcutaneous Daily    gabapentin  100 mg Oral TID    insulin aspart U-100  5 Units Subcutaneous TIDWM    insulin detemir U-100  16 Units Subcutaneous QHS    lisinopriL  10 mg Oral Daily    mupirocin   Nasal BID    tamsulosin  0.8 mg Oral Daily     PRN Meds:   dextrose 10%    dextrose 10%    glucagon (human recombinant)    glucose    glucose    insulin aspart U-100    ondansetron    oxyCODONE    oxyCODONE    sodium chloride 0.9%    traMADoL        Objective:     Vital Signs (Most Recent):  Temp: 96.6 °F (35.9 °C) (04/27/22 0441)  Pulse: 88 (04/27/22 0441)  Resp: 14 (04/27/22 0441)  BP: 122/72 (04/27/22 0441)  SpO2: (!) 93 % (04/27/22 0441)   Vital Signs (24h Range):  Temp:  [96.6 °F (35.9 °C)-97.8 °F (36.6 °C)] 96.6 °F (35.9 °C)  Pulse:  [] 88  Resp:  [14-20] 14  SpO2:  [93 %-98 %] 93 %  BP: ()/(56-78) 122/72     Intake/Output - Last 3 Shifts         04/25 0700 04/26 0659 04/26 0700 04/27 0659 04/27 0700 04/28 0659    P.O.  600     I.V. (mL/kg)  451.7 (4.5) 609.3 (6)     IV Piggyback 2253.6      Total Intake(mL/kg) 2705.3 (26.7) 1209.3 (11.9)     Urine (mL/kg/hr) 1500 600 (0.2)     Emesis/NG output  300     Stool  0     Total Output 1500 900     Net +1205.3 +309.3            Stool Occurrence 0 x 0 x             Physical Exam  Constitutional:       General: He is not in acute distress.  HENT:      Head: Normocephalic and atraumatic.   Eyes:      Extraocular Movements: Extraocular movements intact.      Pupils: Pupils are equal, round, and reactive to light.   Cardiovascular:      Rate and Rhythm: Normal rate and regular rhythm.   Pulmonary:      Effort: Pulmonary effort is normal. No respiratory distress.   Abdominal:      General: There is no distension.      Palpations: Abdomen is soft.      Tenderness: There is abdominal tenderness (appropriately tender).      Comments: Incisions are clean dry and intact with dressing in place over extraction site.   Musculoskeletal:      Comments: Dressing on left foot.  Swollen right arm. No erythema or warmth.   Skin:     General: Skin is warm and dry.      Capillary Refill: Capillary refill takes less than 2 seconds.   Neurological:      General: No focal deficit present.      Mental Status: He is alert and oriented to person, place, and time.       Significant Labs:  BMP (Last 3 Results):   Recent Labs   Lab 04/26/22  0232 04/27/22  0247   * 215*    140   K 4.7 3.6    107   CO2 20* 24   BUN 16 15   CREATININE 1.2 1.1   CALCIUM 8.8 8.7   MG 1.9  --      CBC (Last 3 Results):   Recent Labs   Lab 04/26/22  0232 04/27/22  0247   WBC 9.21 6.06   RBC 4.38* 3.64*   HGB 12.4* 10.4*   HCT 40.1 32.3*    162   MCV 92 89   MCH 28.3 28.6   MCHC 30.9* 32.2       Significant Diagnostics:  I have reviewed all pertinent imaging results/findings within the past 24 hours.    Assessment/Plan:     * Colon adenocarcinoma  51 year old man presenting with colon adenocarcinoma arising from sessile polyp removed in  piecemeal fashion, margins positive for high-grade dysplasia now s/p robotic right colectomy on 4/25. Now with return of bowel function.    - Advance to regular diet today  - D/c fluids  - MM pain control  - Continue home medications  - Sliding scale insulin ordered, endocrinology consult placed for hyperglycemia and diabetes management  -Holding home plavix  - Lovenox for DVT ppx  - PT/OT, weight bearing on left foot with boot  - Podiatry consult in for left foot wound that was present prior to admission. Bedside debridement done yesterday  - Right arm swelling likely due to IV infiltration. Elevate arm and remove IV  - Will get post void residual today to evaluate bladder function.          Waleska Bowling MD  Colorectal Surgery  [facility]

## 2022-04-27 NOTE — SUBJECTIVE & OBJECTIVE
Subjective:     Interval History: No acute events overnight. Low grade tachycardia that has resolved this morning. Otherwise hemodynamically stable. Tolerating clears without nausea or vomiting. Has not had any more bleching. Voided yesterday evening and this morning, although he feels like he still has some urine in his bladder after voiding. He also complains of a swollen arm this morning. Pain is well controlled. Passing flatus this morning.    Post-Op Info:  Procedure(s) (LRB):  XI ROBOTIC COLECTOMY, RIGHT (lithotomy, eras low) (N/A)   2 Days Post-Op      Medications:  Continuous Infusions:  Scheduled Meds:   acetaminophen  1,000 mg Oral Q8H    alvimopan  12 mg Oral BID    atorvastatin  80 mg Oral Daily    enoxaparin  40 mg Subcutaneous Daily    gabapentin  100 mg Oral TID    insulin aspart U-100  5 Units Subcutaneous TIDWM    insulin detemir U-100  16 Units Subcutaneous QHS    lisinopriL  10 mg Oral Daily    mupirocin   Nasal BID    tamsulosin  0.8 mg Oral Daily     PRN Meds:   dextrose 10%    dextrose 10%    glucagon (human recombinant)    glucose    glucose    insulin aspart U-100    ondansetron    oxyCODONE    oxyCODONE    sodium chloride 0.9%    traMADoL        Objective:     Vital Signs (Most Recent):  Temp: 96.6 °F (35.9 °C) (04/27/22 0441)  Pulse: 88 (04/27/22 0441)  Resp: 14 (04/27/22 0441)  BP: 122/72 (04/27/22 0441)  SpO2: (!) 93 % (04/27/22 0441)   Vital Signs (24h Range):  Temp:  [96.6 °F (35.9 °C)-97.8 °F (36.6 °C)] 96.6 °F (35.9 °C)  Pulse:  [] 88  Resp:  [14-20] 14  SpO2:  [93 %-98 %] 93 %  BP: ()/(56-78) 122/72     Intake/Output - Last 3 Shifts         04/25 0700  04/26 0659 04/26 0700 04/27 0659 04/27 0700  04/28 0659    P.O.  600     I.V. (mL/kg) 451.7 (4.5) 609.3 (6)     IV Piggyback 2253.6      Total Intake(mL/kg) 2705.3 (26.7) 1209.3 (11.9)     Urine (mL/kg/hr) 1500 600 (0.2)     Emesis/NG output  300     Stool  0     Total Output 1500 900     Net +1205.3 +309.3             Stool Occurrence 0 x 0 x             Physical Exam  Constitutional:       General: He is not in acute distress.  HENT:      Head: Normocephalic and atraumatic.   Eyes:      Extraocular Movements: Extraocular movements intact.      Pupils: Pupils are equal, round, and reactive to light.   Cardiovascular:      Rate and Rhythm: Normal rate and regular rhythm.   Pulmonary:      Effort: Pulmonary effort is normal. No respiratory distress.   Abdominal:      General: There is no distension.      Palpations: Abdomen is soft.      Tenderness: There is abdominal tenderness (appropriately tender).      Comments: Incisions are clean dry and intact with dressing in place over extraction site.   Musculoskeletal:      Comments: Dressing on left foot.  Swollen right arm. No erythema or warmth.   Skin:     General: Skin is warm and dry.      Capillary Refill: Capillary refill takes less than 2 seconds.   Neurological:      General: No focal deficit present.      Mental Status: He is alert and oriented to person, place, and time.       Significant Labs:  BMP (Last 3 Results):   Recent Labs   Lab 04/26/22  0232 04/27/22  0247   * 215*    140   K 4.7 3.6    107   CO2 20* 24   BUN 16 15   CREATININE 1.2 1.1   CALCIUM 8.8 8.7   MG 1.9  --      CBC (Last 3 Results):   Recent Labs   Lab 04/26/22  0232 04/27/22  0247   WBC 9.21 6.06   RBC 4.38* 3.64*   HGB 12.4* 10.4*   HCT 40.1 32.3*    162   MCV 92 89   MCH 28.3 28.6   MCHC 30.9* 32.2       Significant Diagnostics:  I have reviewed all pertinent imaging results/findings within the past 24 hours.

## 2022-04-27 NOTE — PLAN OF CARE
Problem: Adult Inpatient Plan of Care  Goal: Plan of Care Review  Outcome: Ongoing, Progressing     Problem: Diabetes Comorbidity  Goal: Blood Glucose Level Within Targeted Range  Outcome: Ongoing, Progressing     Problem: Infection  Goal: Absence of Infection Signs and Symptoms  Outcome: Ongoing, Progressing     Problem: Fall Injury Risk  Goal: Absence of Fall and Fall-Related Injury  Outcome: Ongoing, Progressing     Problem: Skin Injury Risk Increased  Goal: Skin Health and Integrity  Outcome: Ongoing, Progressing   Pt  AAOx4, lying awake in bed. POC dicussed with pt. CBG maintained. Pt diet advance, and tolerated well throughout shift. Pt voided without difficulty, ambulation encourage. Dressing remains intact. Bed in lowest position, SRx3, call light within reach.

## 2022-04-27 NOTE — ASSESSMENT & PLAN NOTE
BG goal 140-180    Increase Levemir to 20 units q HS (20% dose increase; fasting BG above goal ranges)   Increase Novolog to 7 units  TID with meals (basal/prandial match)   Continue Moderate Dose Correction Scale  BG monitoring ac/hs    ** Please call Endocrine for any BG related issues **    Discharge plans: TBD    Lab Results   Component Value Date    HGBA1C 8.1 (H) 09/15/2021

## 2022-04-28 LAB
CRP SERPL-MCNC: 35.5 MG/L (ref 0–8.2)
POCT GLUCOSE: 120 MG/DL (ref 70–110)
POCT GLUCOSE: 124 MG/DL (ref 70–110)
POCT GLUCOSE: 252 MG/DL (ref 70–110)
POCT GLUCOSE: 257 MG/DL (ref 70–110)

## 2022-04-28 PROCEDURE — 94799 UNLISTED PULMONARY SVC/PX: CPT

## 2022-04-28 PROCEDURE — 97116 GAIT TRAINING THERAPY: CPT

## 2022-04-28 PROCEDURE — 97530 THERAPEUTIC ACTIVITIES: CPT

## 2022-04-28 PROCEDURE — 99232 PR SUBSEQUENT HOSPITAL CARE,LEVL II: ICD-10-PCS | Mod: ,,, | Performed by: NURSE PRACTITIONER

## 2022-04-28 PROCEDURE — 20600001 HC STEP DOWN PRIVATE ROOM

## 2022-04-28 PROCEDURE — 25000003 PHARM REV CODE 250: Performed by: STUDENT IN AN ORGANIZED HEALTH CARE EDUCATION/TRAINING PROGRAM

## 2022-04-28 PROCEDURE — 63600175 PHARM REV CODE 636 W HCPCS: Performed by: STUDENT IN AN ORGANIZED HEALTH CARE EDUCATION/TRAINING PROGRAM

## 2022-04-28 PROCEDURE — 99232 SBSQ HOSP IP/OBS MODERATE 35: CPT | Mod: ,,, | Performed by: NURSE PRACTITIONER

## 2022-04-28 PROCEDURE — 36415 COLL VENOUS BLD VENIPUNCTURE: CPT | Performed by: STUDENT IN AN ORGANIZED HEALTH CARE EDUCATION/TRAINING PROGRAM

## 2022-04-28 PROCEDURE — 86140 C-REACTIVE PROTEIN: CPT | Performed by: STUDENT IN AN ORGANIZED HEALTH CARE EDUCATION/TRAINING PROGRAM

## 2022-04-28 RX ORDER — SEMAGLUTIDE 1.34 MG/ML
INJECTION, SOLUTION SUBCUTANEOUS
Qty: 3 PEN | Refills: 0 | Status: SHIPPED | OUTPATIENT
Start: 2022-04-28 | End: 2022-12-21 | Stop reason: SDUPTHER

## 2022-04-28 RX ORDER — LIDOCAINE 50 MG/G
1 PATCH TOPICAL
Status: DISCONTINUED | OUTPATIENT
Start: 2022-04-28 | End: 2022-04-29 | Stop reason: HOSPADM

## 2022-04-28 RX ADMIN — TAMSULOSIN HYDROCHLORIDE 0.8 MG: 0.4 CAPSULE ORAL at 09:04

## 2022-04-28 RX ADMIN — MUPIROCIN: 20 OINTMENT TOPICAL at 09:04

## 2022-04-28 RX ADMIN — ATORVASTATIN CALCIUM 80 MG: 20 TABLET, FILM COATED ORAL at 09:04

## 2022-04-28 RX ADMIN — ACETAMINOPHEN 1000 MG: 500 TABLET ORAL at 05:04

## 2022-04-28 RX ADMIN — INSULIN ASPART 6 UNITS: 100 INJECTION, SOLUTION INTRAVENOUS; SUBCUTANEOUS at 12:04

## 2022-04-28 RX ADMIN — INSULIN ASPART 7 UNITS: 100 INJECTION, SOLUTION INTRAVENOUS; SUBCUTANEOUS at 12:04

## 2022-04-28 RX ADMIN — MUPIROCIN: 20 OINTMENT TOPICAL at 10:04

## 2022-04-28 RX ADMIN — INSULIN DETEMIR 20 UNITS: 100 INJECTION, SOLUTION SUBCUTANEOUS at 10:04

## 2022-04-28 RX ADMIN — INSULIN ASPART 3 UNITS: 100 INJECTION, SOLUTION INTRAVENOUS; SUBCUTANEOUS at 10:04

## 2022-04-28 RX ADMIN — GABAPENTIN 100 MG: 100 CAPSULE ORAL at 10:04

## 2022-04-28 RX ADMIN — ENOXAPARIN SODIUM 40 MG: 100 INJECTION SUBCUTANEOUS at 04:04

## 2022-04-28 RX ADMIN — ALVIMOPAN 12 MG: 12 CAPSULE ORAL at 10:04

## 2022-04-28 RX ADMIN — ACETAMINOPHEN 1000 MG: 500 TABLET ORAL at 10:04

## 2022-04-28 RX ADMIN — ACETAMINOPHEN 1000 MG: 500 TABLET ORAL at 01:04

## 2022-04-28 RX ADMIN — GABAPENTIN 100 MG: 100 CAPSULE ORAL at 09:04

## 2022-04-28 RX ADMIN — ALVIMOPAN 12 MG: 12 CAPSULE ORAL at 09:04

## 2022-04-28 RX ADMIN — LISINOPRIL 10 MG: 10 TABLET ORAL at 09:04

## 2022-04-28 NOTE — PROGRESS NOTES
[facility]  Colorectal Surgery  Progress Note    Patient Name: Indio Ryder Jr.  MRN: [id number]  Admission Date: 4/25/2022  Hospital Length of Stay: 3 days  Attending Physician: Erik Stout MD    Subjective:     Interval History: No acute events overnight. Hemodynamically stable. Complaining of some soreness this morning with movement. Tolerating diabetic diet with minimal intermittent nausea and no vomiting. He is not passing gas and did not have a BM yesterday.     Post-Op Info:  Procedure(s) (LRB):  XI ROBOTIC COLECTOMY, RIGHT (lithotomy, eras low) (N/A)   3 Days Post-Op      Medications:  Continuous Infusions:  Scheduled Meds:   acetaminophen  1,000 mg Oral Q8H    alvimopan  12 mg Oral BID    atorvastatin  80 mg Oral Daily    enoxaparin  40 mg Subcutaneous Daily    gabapentin  100 mg Oral TID    insulin aspart U-100  7 Units Subcutaneous TIDWM    insulin detemir U-100  20 Units Subcutaneous QHS    lisinopriL  10 mg Oral Daily    mupirocin   Nasal BID    tamsulosin  0.8 mg Oral Daily     PRN Meds:   dextrose 10%    dextrose 10%    glucagon (human recombinant)    glucose    glucose    insulin aspart U-100    ondansetron    oxyCODONE    oxyCODONE    sodium chloride 0.9%    traMADoL        Objective:     Vital Signs (Most Recent):  Temp: 97.6 °F (36.4 °C) (04/28/22 0421)  Pulse: 90 (04/28/22 0421)  Resp: 16 (04/28/22 0421)  BP: 139/73 (04/28/22 0421)  SpO2: (!) 94 % (04/28/22 0421)   Vital Signs (24h Range):  Temp:  [96.5 °F (35.8 °C)-98.8 °F (37.1 °C)] 97.6 °F (36.4 °C)  Pulse:  [90-99] 90  Resp:  [16-18] 16  SpO2:  [94 %-99 %] 94 %  BP: (107-171)/(63-87) 139/73     Intake/Output - Last 3 Shifts         04/26 0700  04/27 0659 04/27 0700  04/28 0659    P.O. 600     I.V. (mL/kg) 609.3 (6)     Total Intake(mL/kg) 1209.3 (11.9)     Urine (mL/kg/hr) 600 (0.2) 670 (0.3)    Emesis/NG output 300     Stool 0     Total Output 900 670    Net +309.3 -670          Stool Occurrence 0 x              Physical Exam  Constitutional:       General: He is not in acute distress.  HENT:      Head: Normocephalic and atraumatic.   Eyes:      Extraocular Movements: Extraocular movements intact.      Pupils: Pupils are equal, round, and reactive to light.   Cardiovascular:      Rate and Rhythm: Normal rate and regular rhythm.   Pulmonary:      Effort: Pulmonary effort is normal. No respiratory distress.   Abdominal:      General: There is no distension.      Palpations: Abdomen is soft.      Tenderness: There is abdominal tenderness (appropriately tender).      Comments: Incisions are clean dry and intact with dressing in place over extraction site.   Musculoskeletal:      Comments: Dressing on left foot.  Improved swelling in right arm   Skin:     General: Skin is warm and dry.      Capillary Refill: Capillary refill takes less than 2 seconds.   Neurological:      General: No focal deficit present.      Mental Status: He is alert and oriented to person, place, and time.     Significant Labs:  BMP (Last 3 Results):   Recent Labs   Lab 04/26/22  0232 04/27/22  0247   * 215*    140   K 4.7 3.6    107   CO2 20* 24   BUN 16 15   CREATININE 1.2 1.1   CALCIUM 8.8 8.7   MG 1.9  --      CBC (Last 3 Results):   Recent Labs   Lab 04/26/22  0232 04/27/22  0247   WBC 9.21 6.06   RBC 4.38* 3.64*   HGB 12.4* 10.4*   HCT 40.1 32.3*    162   MCV 92 89   MCH 28.3 28.6   MCHC 30.9* 32.2       Significant Diagnostics:  I have reviewed all pertinent imaging results/findings within the past 24 hours.    Assessment/Plan:     * Colon adenocarcinoma  51 year old man presenting with colon adenocarcinoma arising from sessile polyp removed in piecemeal fashion, margins positive for high-grade dysplasia now s/p robotic right colectomy on 4/25. Now gas yesterday but not passing gas today.    - Advance to regular diet today  - MM pain control, consider adding celebrex, lidocaine patch  - Continue home medications  -  Sliding scale insulin ordered, endocrinology consult placed for hyperglycemia and diabetes management  -Holding home plavix  - Lovenox for DVT ppx  - PT/OT, weight bearing on left foot with boot  - Podiatry consult in for left foot wound that was present prior to admission. Bedside debridement done.  - Will get post void residual yesterday normal    Dispo: possible discharge today vs tomorrow          Waleska Bowling MD  Colorectal Surgery  [facility]

## 2022-04-28 NOTE — NURSING
Pt . Marguerite, NP notified, instructed per NP to hold schedule 7 units of aspart. Will continue to monitor

## 2022-04-28 NOTE — NURSING
Pt notified nurse that pharmacy delivered medications to bed side. Pt has questions about medications. Pt noted to have oycodone 5mg, flomax, Zofran, and insulin pens at bedside.  Verified with Brittanie, RN pt have 21 tablets of oxycodone 5mg, Zofran, and flomax. Medications are now stored in the nurse's lock box, and insulin pens are in the patient specific refrigerator until discharge.

## 2022-04-28 NOTE — PLAN OF CARE
Problem: Occupational Therapy  Goal: Occupational Therapy Goal  Description: Goals to be met by: 5/10/22     Patient will increase functional independence with ADLs by performing:    Feeding with [place].  UE Dressing with [place].  LE Dressing with [place].  Grooming while standing at sink with Modified [place].  Toileting from toilet with [place] for hygiene and clothing management.   Toilet transfer to toilet with Supervision.  Pt will engage in functional mobility to simulate household distances in order to maximize functional activity tolerance required for engagement in occupations of choice with supervision using LRAD.      Outcome: Met   Pt is currently performing ADLs, functional mobility and transfers at baseline. OT services are not recommended at this time and pt is safe to discharge home.

## 2022-04-28 NOTE — ASSESSMENT & PLAN NOTE
51 year old man presenting with colon adenocarcinoma arising from sessile polyp removed in piecemeal fashion, margins positive for high-grade dysplasia now s/p robotic right colectomy on 4/25. Now gas yesterday but not passing gas today.    - Advance to regular diet today  - MM pain control, consider adding celebrex, lidocaine patch  - Continue home medications  - Sliding scale insulin ordered, endocrinology consult placed for hyperglycemia and diabetes management  -Holding home plavix  - Lovenox for DVT ppx  - PT/OT, weight bearing on left foot with boot  - Podiatry consult in for left foot wound that was present prior to admission. Bedside debridement done.  - Will get post void residual yesterday normal    Dispo: possible discharge today vs tomorrow

## 2022-04-28 NOTE — SUBJECTIVE & OBJECTIVE
Subjective:     Interval History: No acute events overnight. Hemodynamically stable. Complaining of some soreness this morning with movement. Tolerating diabetic diet with minimal intermittent nausea and no vomiting. He is not passing gas and did not have a BM yesterday.     Post-Op Info:  Procedure(s) (LRB):  XI ROBOTIC COLECTOMY, RIGHT (lithotomy, eras low) (N/A)   3 Days Post-Op      Medications:  Continuous Infusions:  Scheduled Meds:   acetaminophen  1,000 mg Oral Q8H    alvimopan  12 mg Oral BID    atorvastatin  80 mg Oral Daily    enoxaparin  40 mg Subcutaneous Daily    gabapentin  100 mg Oral TID    insulin aspart U-100  7 Units Subcutaneous TIDWM    insulin detemir U-100  20 Units Subcutaneous QHS    lisinopriL  10 mg Oral Daily    mupirocin   Nasal BID    tamsulosin  0.8 mg Oral Daily     PRN Meds:   dextrose 10%    dextrose 10%    glucagon (human recombinant)    glucose    glucose    insulin aspart U-100    ondansetron    oxyCODONE    oxyCODONE    sodium chloride 0.9%    traMADoL        Objective:     Vital Signs (Most Recent):  Temp: 97.6 °F (36.4 °C) (04/28/22 0421)  Pulse: 90 (04/28/22 0421)  Resp: 16 (04/28/22 0421)  BP: 139/73 (04/28/22 0421)  SpO2: (!) 94 % (04/28/22 0421)   Vital Signs (24h Range):  Temp:  [96.5 °F (35.8 °C)-98.8 °F (37.1 °C)] 97.6 °F (36.4 °C)  Pulse:  [90-99] 90  Resp:  [16-18] 16  SpO2:  [94 %-99 %] 94 %  BP: (107-171)/(63-87) 139/73     Intake/Output - Last 3 Shifts         04/26 0700  04/27 0659 04/27 0700  04/28 0659    P.O. 600     I.V. (mL/kg) 609.3 (6)     Total Intake(mL/kg) 1209.3 (11.9)     Urine (mL/kg/hr) 600 (0.2) 670 (0.3)    Emesis/NG output 300     Stool 0     Total Output 900 670    Net +309.3 -670          Stool Occurrence 0 x             Physical Exam  Constitutional:       General: He is not in acute distress.  HENT:      Head: Normocephalic and atraumatic.   Eyes:      Extraocular Movements: Extraocular movements intact.      Pupils: Pupils are equal,  round, and reactive to light.   Cardiovascular:      Rate and Rhythm: Normal rate and regular rhythm.   Pulmonary:      Effort: Pulmonary effort is normal. No respiratory distress.   Abdominal:      General: There is no distension.      Palpations: Abdomen is soft.      Tenderness: There is abdominal tenderness (appropriately tender).      Comments: Incisions are clean dry and intact with dressing in place over extraction site.   Musculoskeletal:      Comments: Dressing on left foot.  Improved swelling in right arm   Skin:     General: Skin is warm and dry.      Capillary Refill: Capillary refill takes less than 2 seconds.   Neurological:      General: No focal deficit present.      Mental Status: He is alert and oriented to person, place, and time.     Significant Labs:  BMP (Last 3 Results):   Recent Labs   Lab 04/26/22  0232 04/27/22  0247   * 215*    140   K 4.7 3.6    107   CO2 20* 24   BUN 16 15   CREATININE 1.2 1.1   CALCIUM 8.8 8.7   MG 1.9  --      CBC (Last 3 Results):   Recent Labs   Lab 04/26/22  0232 04/27/22  0247   WBC 9.21 6.06   RBC 4.38* 3.64*   HGB 12.4* 10.4*   HCT 40.1 32.3*    162   MCV 92 89   MCH 28.3 28.6   MCHC 30.9* 32.2       Significant Diagnostics:  I have reviewed all pertinent imaging results/findings within the past 24 hours.

## 2022-04-28 NOTE — PT/OT/SLP PROGRESS
Occupational Therapy   Treatment and Discharge    Name: Indio Ryder Jr.  MRN: [id number]  Admitting Diagnosis:  Colon adenocarcinoma  3 Days Post-Op    Recommendations:     Discharge Recommendations: home  Discharge Equipment Recommendations:  none  Barriers to discharge:  None    Assessment:     Indio Ryder Jr. is a 51 y.o. male with a medical diagnosis of Colon adenocarcinoma.  He presents with good motivation and participation. Pt with improved stability and safety awareness this session. Pt is currently performing ADLs, functional mobility and transfers at baseline. OT services are not recommended at this time and pt is safe to discharge home.       Plan:     Patient to be seen 3 x/week to address the above listed problems via self-care/home management, therapeutic activities, therapeutic exercises, neuromuscular re-education  · Plan of Care Expires: 05/26/22  · Plan of Care Reviewed with: patient    Subjective     Pain/Comfort:  · Pain Rating 1:  (L lower abdominal pain-unrated)    Objective:     Communicated with: RN prior to session.  Patient found up in chair with  (no active lines) upon OT entry to room.    General Precautions: Standard, fall   Orthopedic Precautions:LLE weight bearing as tolerated (in CAM boot)   Braces:  (CAM boot; [place])  Respiratory Status: Room air     Occupational Performance:     Bed Mobility:    · Did not observe; pt found and returned to bedside chair    Functional Mobility/Transfers:  · Patient completed Sit <> Stand Transfer with supervision  with  no assistive device   · Patient completed Toilet Transfer Step Transfer technique with supervision with  grab bars  · Functional Mobility: Pt ambulated bed<>bathroom with supervision and no AD in order to maximize functional activity tolerance and standing balance required for engagement in occupations of choice.    · Pt ambulated in halls with PT prior to OT arrival  · No LOB, SOB, or c/o of dizziness  · Increase abdominal  pain with toilet transfer; MD at bedside and aware    Activities of Daily Living:  · Pt declined to participate during session; reports completing with independence and expressed no concerns      [facility] 6 Click ADL: 24    Treatment & Education:  -Therapist provided facilitation and instruction of proper body mechanics, energy conservation, and fall prevention strategies during tasks listed above.  -Pt educated on role of OT and D/C from acute OT  -Pt educated on importance of OOB activities with staff member assistance and sitting OOB majority of the day.   -Pt verbalized understanding. Pt expressed no further concerns/questions  -Whiteboard updated     Patient left up in chair with all lines intact and call button in reachEducation:      GOALS:   Multidisciplinary Problems     Occupational Therapy Goals     Not on file          Multidisciplinary Problems (Resolved)        Problem: Occupational Therapy    Goal Priority Disciplines Outcome Interventions   Occupational Therapy Goal   (Resolved)     OT, PT/OT Met    Description: Goals to be met by: 5/10/22     Patient will increase functional independence with ADLs by performing:    Feeding with [place].  UE Dressing with [place].  LE Dressing with [place].  Grooming while standing at sink with Modified [place].  Toileting from toilet with [place] for hygiene and clothing management.   Toilet transfer to toilet with Supervision.  Pt will engage in functional mobility to simulate household distances in order to maximize functional activity tolerance required for engagement in occupations of choice with supervision using LRAD.                       Time Tracking:     OT Date of Treatment: 04/28/22  OT Start Time: 1355  OT Stop Time: 1403  OT Total Time (min): 8 min    Billable Minutes:Therapeutic Activity 8    OT/NINFA: OT          4/28/2022

## 2022-04-28 NOTE — PLAN OF CARE
Pt continues to progress towards treatment goals established in POC. Will reassess as appropriate.    Problem: Physical Therapy  Goal: Physical Therapy Goal  Description: Goals to be met by: 5/10/22     Patient will increase functional independence with mobility by performin. Supine to sit with [place]  2. Sit to supine with [place]  3. Sit to stand transfer with Modified [place] using LRAD  4. Bed to chair transfer with Modified [place] using LRAD  5. Gait  x 100 feet with Supervision using LRAD.   6. Lower extremity exercise program x15 reps per handout, with independence    Outcome: Ongoing, Progressing

## 2022-04-28 NOTE — NURSING
Pt . Marguerite, NP notified, instructed per NP to hold schedule 7 units of aspart. Will continue to monitor.

## 2022-04-28 NOTE — SUBJECTIVE & OBJECTIVE
"Interval HPI:   Overnight events: Remains in GISSU. POD 3. BG reasonably well controlled on current SQ insulin regimen. Diet diabetic [facility];  Calorie; Low Fiber, Consistent Carbohydrate  Eatin%  Nausea: No  Hypoglycemia and intervention: No  Fever: No  TPN and/or TF: No  If yes, type of TF/TPN and rate: n/a    BP (!) 162/88   Pulse 85   Temp 98.1 °F (36.7 °C) (Oral)   Resp 18   Ht 6' 1" (1.854 m)   Wt 104.9 kg (231 lb 4.2 oz)   SpO2 96%   BMI 30.51 kg/m²     Labs Reviewed and Include    No results for input(s): GLU, CALCIUM, ALBUMIN, PROT, NA, K, CO2, CL, BUN, CREATININE, ALKPHOS, ALT, AST, BILITOT in the last 24 hours.  Lab Results   Component Value Date    WBC 6.06 2022    HGB 10.4 (L) 2022    HCT 32.3 (L) 2022    MCV 89 2022     2022     No results for input(s): TSH, FREET4 in the last 168 hours.  Lab Results   Component Value Date    HGBA1C 8.1 (H) 09/15/2021       Nutritional status:   Body mass index is 30.51 kg/m².  Lab Results   Component Value Date    ALBUMIN 3.3 (L) 2022    ALBUMIN 3.7 09/15/2021    ALBUMIN 2.5 (L) 2021     No results found for: PREALBUMIN    Estimated Creatinine Clearance: 98.8 mL/min (based on SCr of 1.1 mg/dL).    Accu-Checks  Recent Labs     22  1610 22  2130 22  0756 22  1625 22  2102 22  0808 22  1202 22  1631 22  0736   POCTGLUCOSE 150* 251* 230* 240* 242* 178* 182* 174* 174* 120*       Current Medications and/or Treatments Impacting Glycemic Control  Immunotherapy:    Immunosuppressants       None          Steroids:   Hormones (From admission, onward)                None          Pressors:    Autonomic Drugs (From admission, onward)                None          Hyperglycemia/Diabetes Medications:   Antihyperglycemics (From admission, onward)                Start     Stop Route Frequency Ordered    22  insulin detemir U-100 " pen 20 Units         -- SubQ Nightly 04/27/22 1400    04/27/22 1645  insulin aspart U-100 pen 7 Units         -- SubQ 3 times daily with meals 04/27/22 1400    04/25/22 1726  insulin aspart U-100 pen 1-10 Units         -- SubQ Before meals & nightly PRN 04/25/22 1623

## 2022-04-28 NOTE — ASSESSMENT & PLAN NOTE
BG goal 140-180    Continue Levemir 20 units q HS  Continue Novolog 7 units  TID with meals (basal/prandial match)   Continue Moderate Dose Correction Scale  BG monitoring ac/hs    ** Please call Endocrine for any BG related issues **    Discharge plans:   -Resume Levemir 24 units once nightly  -Resume Novolog 8 units TID with meals w/ correction scale 150-200+2  -Resume Metformin 1000 mg BID  -Discontinue Trulicity (not tolerating)  -Trial starting Ozempic 0.25 mg SC qwk x 4wk, then incr to 0.5 mg SC qwk    Patient to keep BG logs and record BG 4x daily. Notify MD for any BG < 80 or consistently > 200. Reviewed with patient medication changes, insulin dosing, when to hold insulin, when to notify MD, importance of follow up, and s/s and treatment of hypoglycemia. Patient to follow up with Gigi Clay NP as schedue. Patient in agreement with plan of care and no further questions at this time.       Lab Results   Component Value Date    HGBA1C 8.1 (H) 09/15/2021

## 2022-04-28 NOTE — PLAN OF CARE
Problem: Adult Inpatient Plan of Care  Goal: Plan of Care Review  Outcome: Ongoing, Progressing  Goal: Optimal Comfort and Wellbeing  Outcome: Ongoing, Progressing     Problem: Impaired Wound Healing  Goal: Optimal Wound Healing  Outcome: Ongoing, Progressing   Pt  AAOx4, lying awake in bed. POC dicussed with pt. CBG maintained. Pt ambulated throughout shift. Pt is due to have a BM. Dressing remains intact. Bed in lowest position, SRx3, call light within reach.

## 2022-04-28 NOTE — PT/OT/SLP PROGRESS
Physical Therapy Treatment    Patient Name:  Indio Ryder Jr.   MRN:  [id number]    Recent Surgery: Procedure(s) (LRB):  XI ROBOTIC COLECTOMY, RIGHT (lithotomy, eras low) (N/A) 3 Days Post-Op    Recommendations:     Discharge Recommendations:  home   Discharge Equipment Recommendations: none   Barriers to discharge: None    Highest Level of Mobility: Gait ~600'  Assistance Required: SBA no AD    Assessment:     Indio Ryder Jr. is a 51 y.o. male admitted with a medical diagnosis of Colon adenocarcinoma.  He presents with the following impairments/functional limitations:  weakness, impaired endurance, impaired self care skills, impaired functional mobilty, gait instability, impaired balance, impaired cardiopulmonary response to activity. Indio Ryder Jr. would benefit from acute PT intervention to address listed functional deficits, provide patient/caregiver education, reduce fall risk, and maximize (I) and safety with functional mobility.    Pt met with HOB elevated, family present and agreeable to PT treatment. Today's PT treatment focus was on gait training to improve activity tolerance and address gait deviations. Pt demos mild dyspnea during gait training, but did not require any rest breaks today.Pt is progressing towards acute PT goals appropriately and continues to benefit from acute PT sessions. Therapy frequency decreased to 2x weekly due to pt's rapid progression. Writing therapist anticipates pt will have no post acute therapy needs following discharge.    Rehab Prognosis: Good; patient would benefit from acute skilled PT services to address these deficits and reach maximum level of function.      Plan:     During this hospitalization, patient to be seen 2 x/week to address the identified rehab impairments via gait training, therapeutic activities, therapeutic exercises and progress toward the following goals:    · Plan of Care Expires:  05/26/22    This plan of care has been discussed with  "the patient/caregiver, who was included in its development and is in agreement with the identified goals and treatment plan.     Subjective     Communicated with RN prior to session.  Patient agreeable to participate.     Pain/Comfort:  · Pain Rating 1: 0/10  · Pain Rating Post-Intervention 1: 0/10    Chief Complaint: Colon adenocarcinoma   Patient/Family Comments/goals: "I walked yesterday too"      Objective:     Patient found HOB elevated with  (no active lines)  upon PT entry to room.    General Precautions: Standard, fall   Orthopedic Precautions:LLE weight bearing as tolerated (in CAM boot)   Braces:  (CAM boot)         Exams:     Cognition:  o Patient is oriented to Person, Place, Time, Situation, follows commands 100% of the time    Functional Mobility:    Bed Mobility:  · Supine to Sit: Supervision or Set-up Assistance on L side of bed  · Scooting anteriorly to EOB to plant feet on floor: Supervision or Set-up Assistance    Transfers:   · Sit to Stand Transfer: Supervision or Set-up Assistance  from EOB with no AD   · L LE CAM boot dontrace, SHERIDAN on R  · Bed to Chair: Stand-by Assistance with no AD via stand step t/f             Gait:  · Patient received gait training in hallway ~600 feet with Stand-by Assistance and no DME  · Gait Assessment: occasional unsteady gait, decreased step length, wide base of support and flexed posture  · Gait Pattern Observed: Step-through reciprocal gait   · Comments: mask donned for out of room ambulation. PT provided tactile cues for improved upright posture and verbal instruction on pursed lip breathing.    Balance:  · Static Sit:   · Supervision at EOB   · Normal: Patient able to maintain steady balance without handhold support.  · Static Stand:   · Stand-By Assist with no AD  · Normal: Patient able to maintain steady balance without handhold support.  · Dynamic Stand:  · Stand-By Assist with no AD      Therapeutic Activities/Exercises      Patient assisted with " functional mobility as noted above   Patient instructed to ambulate at least 3x daily with hospital staff assist   Patient educated on the importance of early mobility to prevent functional decline during hospital stay   Patient was instructed to utilize staff assistance for mobility/transfers.  o Patient is appropriate to transfer with SBA and RN/PCT assist   Patient educated on PT POC and role of PT in acute care   White board updated regarding patient's safest level of mobility with staff assistance, RN also updated.     AM-PAC 6 CLICK MOBILITY  Turning over in bed (including adjusting bedclothes, sheets and blankets)?: 4  Sitting down on and standing up from a chair with arms (e.g., wheelchair, bedside commode, etc.): 4  Moving from lying on back to sitting on the side of the bed?: 4  Moving to and from a bed to a chair (including a wheelchair)?: 4  Need to walk in hospital room?: 3  Climbing 3-5 steps with a railing?: 3  Basic Mobility Total Score: 22     Patient left up in chair with all lines intact, call button in reach, RN notified and family present.        History/Goals:     PAST MEDICAL HISTORY:  Past Medical History:   Diagnosis Date    Actinomyces infection 01/17/2017    Right foot    Colon adenocarcinoma 04/12/2022    Diabetic ketoacidosis without coma associated with type 2 diabetes mellitus 05/30/2017    Diabetic ulcer of right foot associated with type 2 diabetes mellitus 06/03/2015    Disorder of kidney and ureter     Essential hypertension 06/06/2013    Group B streptococcal infection 01/13/2017    Mixed hyperlipidemia 08/12/2014    Septic arthritis of left foot 04/28/2021    Shoulder impingement 08/12/2014    Subacute osteomyelitis of right foot 01/12/2017    Streptococcus agalactiae, Actinomyces odontolyticus    Traumatic amputation of fifth toe of right foot 07/02/2015    Type 2 diabetes mellitus with diabetic neuropathy, with long-term current use of insulin 05/03/2016        Past Surgical History:   Procedure Laterality Date    COLONOSCOPY Right 1/19/2022    Procedure: COLONOSCOPY;  Surgeon: Tj Haile MD;  Location: [facility] ENDO (4TH FLR);  Service: Endoscopy;  Laterality: Right;  previous order un-usable/poor prep 1/18/22  RAPID COVID test arrival 12:20  instructions handed to pt- sm    COLONOSCOPY N/A 3/21/2022    Procedure: COLONOSCOPY;  Surgeon: Sheng Haque MD;  Location: [facility] ENDO (2ND FLR);  Service: Endoscopy;  Laterality: N/A;  Colonoscopy with AES for hepatix flexure polyp removal, 2nd floor  Pt is fully vaccinated-DS  Pt prescribed Plavix by Dr. Stahl in Jan. 2022, however pt states that he never started taking it-DS  3/16/22-Instructions sent via portal-DS    DEBRIDEMENT OF FOOT Left 7/14/2019    Procedure: DEBRIDEMENT, FOOT, with left 5th ray amputation;  Surgeon: Sis Hickman DPM;  Location: [facility] OR 2ND FLR;  Service: Podiatry;  Laterality: Left;    DEBRIDEMENT OF FOOT Left 7/17/2019    Procedure: DEBRIDEMENT, FOOT with leftt 5th ray partial amputation with Neox Graft;  Surgeon: Mai Burrell DPM;  Location: [facility] OR 2ND FLR;  Service: Podiatry;  Laterality: Left;  t    DEBRIDEMENT OF FOOT Bilateral 4/29/2021    Procedure: DEBRIDEMENT, FOOT;  Surgeon: Mai Burrell DPM;  Location: [facility] OR 1ST FLR;  Service: Podiatry;  Laterality: Bilateral;  Graft application    TOE AMPUTATION Right 06/05/2015    TOE AMPUTATION Right 01/19/2017    XI ROBOTIC COLECTOMY, RIGHT N/A 4/25/2022    Procedure: XI ROBOTIC COLECTOMY, RIGHT (lithotomy, eras low);  Surgeon: Erik Stout MD;  Location: [facility] OR 2ND FLR;  Service: Colon and Rectal;  Laterality: N/A;  Paruch to [place]    XI ROBOTIC COLECTOMY, RIGHT  4/25/2022    Procedure: ;  Surgeon: Erik Stout MD;  Location: [facility] OR 2ND FLR;  Service: Colon and Rectal;;       GOALS:   Multidisciplinary Problems     Physical Therapy Goals        Problem: Physical Therapy    Goal Priority  Disciplines Outcome Goal Variances Interventions   Physical Therapy Goal     PT, PT/OT Ongoing, Progressing     Description: Goals to be met by: 5/10/22     Patient will increase functional independence with mobility by performin. Supine to sit with [place]  2. Sit to supine with [place]  3. Sit to stand transfer with Modified [place] using LRAD  4. Bed to chair transfer with Modified [place] using LRAD  5. Gait  x 100 feet with Supervision using LRAD.   6. Lower extremity exercise program x15 reps per handout, with independence                     Time Tracking:     PT Received On: 22  PT Start Time: 1314     PT Stop Time: 1326  PT Total Time (min): 12 min     Billable Minutes: Gait Training 12      Aster Vogt, PT  2022  Pager# [phone]

## 2022-04-28 NOTE — PROGRESS NOTES
"Agustin Melgoza - [facility]  Endocrinology  Progress Note    Admit Date: 2022     Reason for Consult: Management of T2DM, Hyperglycemia     Surgical Procedure and Date:   22  XI ROBOTIC COLECTOMY, RIGHT (lithotomy, eras low) (N/A)     Diabetes diagnosis year: > 10 years ago    Home Diabetes Medications:  Levemir 26 units q HS, Novolog 8 units TID with meals and correction scale (150-200/+2), Metformin 1000 mg BID, and Trulicity 1.5 mg injection every 7 days (reports he has stopped taking the Trulicity x 1 month)     How often checking glucose at home?  Once daily in the morning    BG readings on regimen: recently has been > 200  Hypoglycemia on the regimen?  No  Missed doses on regimen?  Yes, frequently skips Novolog (reports does not eat 3 meals daily) and stopped Trulicity     Diabetes Complications include:     Hyperglycemia, Diabetic retinopathy , Diabetic peripheral neuropathy , Foot ulcer  , and Amputation status    Complicating diabetes co morbidities:   HTN, HLD, PVD, chronic LE wounds, previous DKA       HPI:   Patient is a 51 y.o. male with a diagnosis of HTN, DM, PVD, and adenocarcinoma of the colon at the hepatic flexure. Patient has chronic L foot wound, currently being seen by podiatry. Stress test  shows EF 50% and normal myocardial perfusion with no chest pain or arrhythmias. Patient s/p the above procedure(s). Endocrinology consulted for management of T2DM.     Lab Results   Component Value Date    HGBA1C 8.1 (H) 09/15/2021           Interval HPI:   Overnight events: Remains in [facility]. POD 3. BG reasonably well controlled on current SQ insulin regimen. Diet diabetic [facility];  Calorie; Low Fiber, Consistent Carbohydrate  Eatin%  Nausea: No  Hypoglycemia and intervention: No  Fever: No  TPN and/or TF: No  If yes, type of TF/TPN and rate: n/a    BP (!) 162/88   Pulse 85   Temp 98.1 °F (36.7 °C) (Oral)   Resp 18   Ht 6' 1" (1.854 m)   Wt 104.9 kg (231 lb 4.2 oz)   SpO2 96%  "  BMI 30.51 kg/m²     Labs Reviewed and Include    No results for input(s): GLU, CALCIUM, ALBUMIN, PROT, NA, K, CO2, CL, BUN, CREATININE, ALKPHOS, ALT, AST, BILITOT in the last 24 hours.  Lab Results   Component Value Date    WBC 6.06 04/27/2022    HGB 10.4 (L) 04/27/2022    HCT 32.3 (L) 04/27/2022    MCV 89 04/27/2022     04/27/2022     No results for input(s): TSH, FREET4 in the last 168 hours.  Lab Results   Component Value Date    HGBA1C 8.1 (H) 09/15/2021       Nutritional status:   Body mass index is 30.51 kg/m².  Lab Results   Component Value Date    ALBUMIN 3.3 (L) 04/11/2022    ALBUMIN 3.7 09/15/2021    ALBUMIN 2.5 (L) 07/07/2021     No results found for: PREALBUMIN    Estimated Creatinine Clearance: 98.8 mL/min (based on SCr of 1.1 mg/dL).    Accu-Checks  Recent Labs     04/25/22  1610 04/25/22  2130 04/26/22  0756 04/26/22  1625 04/26/22  2102 04/27/22  0808 04/27/22  1202 04/27/22  1631 04/27/22 2003 04/28/22  0736   POCTGLUCOSE 150* 251* 230* 240* 242* 178* 182* 174* 174* 120*       Current Medications and/or Treatments Impacting Glycemic Control  Immunotherapy:    Immunosuppressants       None          Steroids:   Hormones (From admission, onward)                None          Pressors:    Autonomic Drugs (From admission, onward)                None          Hyperglycemia/Diabetes Medications:   Antihyperglycemics (From admission, onward)                Start     Stop Route Frequency Ordered    04/27/22 2100  insulin detemir U-100 pen 20 Units         -- SubQ Nightly 04/27/22 1400    04/27/22 1645  insulin aspart U-100 pen 7 Units         -- SubQ 3 times daily with meals 04/27/22 1400    04/25/22 1726  insulin aspart U-100 pen 1-10 Units         -- SubQ Before meals & nightly PRN 04/25/22 1626            ASSESSMENT and PLAN    * Colon adenocarcinoma  Managed per primary team  Optimize BG control    Type 2 diabetes mellitus with hyperglycemia, with long-term current use of insulin  BG goal  140-180    Continue Levemir 20 units q HS  Continue Novolog 7 units  TID with meals (basal/prandial match)   Continue Moderate Dose Correction Scale  BG monitoring ac/hs    ** Please call Endocrine for any BG related issues **    Discharge plans:   -Resume Levemir 24 units once nightly  -Resume Novolog 8 units TID with meals w/ correction scale 150-200+2  -Resume Metformin 1000 mg BID  -Discontinue Trulicity (not tolerating)  -Trial starting Ozempic 0.25 mg SC qwk x 4wk, then incr to 0.5 mg SC qwk    Patient to keep BG logs and record BG 4x daily. Notify MD for any BG < 80 or consistently > 200. Reviewed with patient medication changes, insulin dosing, when to hold insulin, when to notify MD, importance of follow up, and s/s and treatment of hypoglycemia. Patient to follow up with Gigi Clay NP as schedue. Patient in agreement with plan of care and no further questions at this time.       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             Erma Jay NP  Endocrinology  [facility]

## 2022-04-29 VITALS
BODY MASS INDEX: 30.65 KG/M2 | HEIGHT: 73 IN | WEIGHT: 231.25 LBS | SYSTOLIC BLOOD PRESSURE: 171 MMHG | HEART RATE: 85 BPM | OXYGEN SATURATION: 98 % | RESPIRATION RATE: 16 BRPM | DIASTOLIC BLOOD PRESSURE: 94 MMHG | TEMPERATURE: 97 F

## 2022-04-29 LAB
ANION GAP SERPL CALC-SCNC: 10 MMOL/L (ref 8–16)
BUN SERPL-MCNC: 13 MG/DL (ref 6–20)
CALCIUM SERPL-MCNC: 9.3 MG/DL (ref 8.7–10.5)
CHLORIDE SERPL-SCNC: 106 MMOL/L (ref 95–110)
CO2 SERPL-SCNC: 26 MMOL/L (ref 23–29)
CREAT SERPL-MCNC: 0.8 MG/DL (ref 0.5–1.4)
CRP SERPL-MCNC: 19.5 MG/L (ref 0–8.2)
ERYTHROCYTE [DISTWIDTH] IN BLOOD BY AUTOMATED COUNT: 13.3 % (ref 11.5–14.5)
EST. GFR  (AFRICAN AMERICAN): >60 ML/MIN/1.73 M^2
EST. GFR  (NON AFRICAN AMERICAN): >60 ML/MIN/1.73 M^2
FINAL PATHOLOGIC DIAGNOSIS: NORMAL
GLUCOSE SERPL-MCNC: 178 MG/DL (ref 70–110)
HCT VFR BLD AUTO: 31.5 % (ref 40–54)
HGB BLD-MCNC: 9.9 G/DL (ref 14–18)
Lab: NORMAL
MCH RBC QN AUTO: 28.1 PG (ref 27–31)
MCHC RBC AUTO-ENTMCNC: 31.4 G/DL (ref 32–36)
MCV RBC AUTO: 90 FL (ref 82–98)
PLATELET # BLD AUTO: 183 K/UL (ref 150–450)
PMV BLD AUTO: 11.6 FL (ref 9.2–12.9)
POCT GLUCOSE: 168 MG/DL (ref 70–110)
POTASSIUM SERPL-SCNC: 3.5 MMOL/L (ref 3.5–5.1)
RBC # BLD AUTO: 3.52 M/UL (ref 4.6–6.2)
SODIUM SERPL-SCNC: 142 MMOL/L (ref 136–145)
WBC # BLD AUTO: 5.51 K/UL (ref 3.9–12.7)

## 2022-04-29 PROCEDURE — 36415 COLL VENOUS BLD VENIPUNCTURE: CPT | Performed by: STUDENT IN AN ORGANIZED HEALTH CARE EDUCATION/TRAINING PROGRAM

## 2022-04-29 PROCEDURE — 25000003 PHARM REV CODE 250: Performed by: STUDENT IN AN ORGANIZED HEALTH CARE EDUCATION/TRAINING PROGRAM

## 2022-04-29 PROCEDURE — 94761 N-INVAS EAR/PLS OXIMETRY MLT: CPT

## 2022-04-29 PROCEDURE — 86140 C-REACTIVE PROTEIN: CPT | Performed by: STUDENT IN AN ORGANIZED HEALTH CARE EDUCATION/TRAINING PROGRAM

## 2022-04-29 PROCEDURE — 80048 BASIC METABOLIC PNL TOTAL CA: CPT | Performed by: STUDENT IN AN ORGANIZED HEALTH CARE EDUCATION/TRAINING PROGRAM

## 2022-04-29 PROCEDURE — 99900035 HC TECH TIME PER 15 MIN (STAT)

## 2022-04-29 PROCEDURE — 85027 COMPLETE CBC AUTOMATED: CPT | Performed by: STUDENT IN AN ORGANIZED HEALTH CARE EDUCATION/TRAINING PROGRAM

## 2022-04-29 RX ADMIN — MUPIROCIN: 20 OINTMENT TOPICAL at 08:04

## 2022-04-29 RX ADMIN — INSULIN ASPART 7 UNITS: 100 INJECTION, SOLUTION INTRAVENOUS; SUBCUTANEOUS at 08:04

## 2022-04-29 RX ADMIN — ACETAMINOPHEN 1000 MG: 500 TABLET ORAL at 06:04

## 2022-04-29 RX ADMIN — LISINOPRIL 10 MG: 10 TABLET ORAL at 08:04

## 2022-04-29 RX ADMIN — ALVIMOPAN 12 MG: 12 CAPSULE ORAL at 08:04

## 2022-04-29 RX ADMIN — GABAPENTIN 100 MG: 100 CAPSULE ORAL at 08:04

## 2022-04-29 RX ADMIN — ATORVASTATIN CALCIUM 80 MG: 20 TABLET, FILM COATED ORAL at 08:04

## 2022-04-29 NOTE — PROGRESS NOTES
Patient discharged in stable condition, discharged instructions given to patient and wife, verbalized understanding, new medications given to patient, VSS, no distress, transported via wheelchair.

## 2022-04-29 NOTE — PLAN OF CARE
Problem: Adult Inpatient Plan of Care  Goal: Plan of Care Review  4/29/2022 0825 by Yanni Emery RN  Outcome: Met  4/29/2022 0824 by Yanni Emery RN  Outcome: Ongoing, Progressing  Goal: Patient-Specific Goal (Individualized)  4/29/2022 0825 by Yanni Emery RN  Outcome: Met  4/29/2022 0824 by Yanni Emery RN  Outcome: Ongoing, Progressing  Goal: Absence of Hospital-Acquired Illness or Injury  4/29/2022 0825 by Yanni Emery RN  Outcome: Met  4/29/2022 0824 by Yanni Emery RN  Outcome: Ongoing, Progressing  Goal: Optimal Comfort and Wellbeing  4/29/2022 0825 by Yanni Emery RN  Outcome: Met  4/29/2022 0824 by Yanni Emery RN  Outcome: Ongoing, Progressing  Goal: Readiness for Transition of Care  4/29/2022 0825 by Yanni Emery RN  Outcome: Met  4/29/2022 0824 by Yanni Emery RN  Outcome: Ongoing, Progressing     Problem: Diabetes Comorbidity  Goal: Blood Glucose Level Within Targeted Range  4/29/2022 0825 by Yanni Emery RN  Outcome: Met  4/29/2022 0824 by Yanni Emery RN  Outcome: Ongoing, Progressing     Problem: Impaired Wound Healing  Goal: Optimal Wound Healing  4/29/2022 0825 by Yanni Emery RN  Outcome: Met  4/29/2022 0824 by Yanni Emery RN  Outcome: Ongoing, Progressing     Problem: Infection  Goal: Absence of Infection Signs and Symptoms  4/29/2022 0825 by Yanni Emery RN  Outcome: Met  4/29/2022 0824 by Yanni Emery RN  Outcome: Ongoing, Progressing     Problem: Fall Injury Risk  Goal: Absence of Fall and Fall-Related Injury  4/29/2022 0825 by Yanni Emery RN  Outcome: Met  4/29/2022 0824 by Yanni Emery RN  Outcome: Ongoing, Progressing     Problem: Skin Injury Risk Increased  Goal: Skin Health and Integrity  4/29/2022 0825 by Yanni Emery RN  Outcome: Met  4/29/2022 0824 by Yanni Emery RN  Outcome: Ongoing, Progressing

## 2022-04-29 NOTE — PLAN OF CARE
Agustin Melgoza - [facility]  Discharge Final Note    Primary Care Provider: Lorena Thakur MD    Expected Discharge Date: 4/29/2022    Final Discharge Note (most recent)     Final Note - 04/29/22 1004        Final Note    Assessment Type Final Discharge Note     Anticipated Discharge Disposition Home or Self Care     Hospital Resources/Appts/Education Provided Appointments scheduled and added to AVS        Post-Acute Status    Post-Acute Authorization Other     Other Status No Post-Acute Service Needs                 Important Message from Medicare  Important Message from Medicare regarding Discharge Appeal Rights: Given to patient/caregiver, Explained to patient/caregiver, Signed/date by patient/caregiver     Date IMM was signed: 04/29/22  Time IMM was signed: 0843    Contact Info     Erik Stout MD   Specialty: Colon and Rectal Surgery    [address] [facility] [address]   Phone: [phone]       Next Steps: Follow up on 5/17/2022    Instructions: For wound re-check, pathology reviewed    Lorena Thakur MD   Specialty: Internal Medicine   Relationship: PCP - General    [address] [facility] [address]   Phone: [phone]       Next Steps: Follow up on 5/10/2022    Instructions: F/U appointment 1:00 PM        Future Appointments   Date Time Provider Department Center   5/10/2022  1:00 PM Lorena Thakur MD [facility]   5/17/2022  8:20 AM Erik Stout MD [facility] COLON [facility]   5/26/2022  9:00 AM Marshall Stahl MD [facility] CARDIO [facility]     Naomy Marc RN,    Ext: [phone]

## 2022-04-29 NOTE — PLAN OF CARE
POC reviewed w/ pt, verbalized understanding. Pt AAOx4. VS stable on RA. IS bedside. Denies nausea, chest pain, & SOB. Pt remains free of fall & injury. No acute events. Bed in lowest position, call light in reach, frequent rounds made for safety.    - L foot diabetic ulcer cover with football dressing (per pt), ace bandage.  - 3-4 inch R side abdominal transverse incision w/ DB CDI & 3 Lap sites w/ DB CDI  - 1 small soft BM had overnight as per pt. Reports  - ACHS checks, coverage given overnight  - Pt ambulates independently  - No c/o pain overnight     Care transferred to oncoming nurse.       Problem: Adult Inpatient Plan of Care  Goal: Absence of Hospital-Acquired Illness or Injury  Outcome: Ongoing, Progressing     Problem: Adult Inpatient Plan of Care  Goal: Optimal Comfort and Wellbeing  Outcome: Ongoing, Progressing     Problem: Adult Inpatient Plan of Care  Goal: Readiness for Transition of Care  Outcome: Ongoing, Progressing     Problem: Impaired Wound Healing  Goal: Optimal Wound Healing  Outcome: Ongoing, Progressing

## 2022-04-29 NOTE — DISCHARGE SUMMARY
Agustin Melgoza - [facility]  General Surgery  Discharge Summary      Patient Name: Indio Ryder Jr.  MRN: [id number]  Admission Date: 4/25/2022  Hospital Length of Stay: 4 days  Discharge Date and Time:  04/29/2022 7:44 AM  Attending Physician: Erik Stout MD   Discharging Provider: Fadi Perkins MD  Primary Care Provider: Lorena Thakur MD     HPI:   Mr. Ryder is a 51 year old man with a history of DM and HLD who presents for evaluation of colon adenocarcinoma. He underwent a colonoscopy on 1/19/2022 with Dr. Haile and was found to have a sessile hepatic flexure polyp (single tattoo at that time) > referred to Dr. Haque for EMR (3/21/2022 - initial colonoscopy report was mislabeled, addendum made) - removed in piecemeal fashion but was described as complete resection. Of note, he carries a diagnosis of UC but he said that this was made over a decade ago when he was taking NSAIDs frequently - he has not had any issues or been treated for this in over a decade. He is asymptomatic, and denies any family history of CRC (sister had breast cancer). He is being treated for a lower extremity ulcer - last HgbA1c 8.1 9/2021.    Oncology History   Colon adenocarcinoma   3/30/2022 Pathology Significant Finding     COLON, HEPATIC FLEXURE POLYP, EMR:   -Invasive adenocarcinoma, moderately differentiated, arising in a tubular   adenoma.   TUMOR SITE:  Hepatic flexure.   HISTOLOGIC TYPE:  Adenocarcinoma.   HISTOLOGIC GRADE:  G2 moderately differentiated.   SIZE OF INVASIVE TUMOR:  At least 0.3 mm.   TUMOR EXTENT:  Invades the submucosa.   LYMPHOVASCULAR INVASION:  Not identified.   POLYP SIZE:  Greatest dimensions:  1.4 cm, see comment.   MARGINS:  Margins are positive for high grade dysplasia.                        No carcinoma is identified at cauterized margins.   COMMENT:   It is difficult measure the exact size of the invasive tumor and polyp   configuration secondary to the fragmented nature of the specimen and    tangential sectioning.       3/30/2022 Initial Diagnosis     Colon adenocarcinoma      4/11/2022 Imaging Significant Findings     CT CAP  1. In this patient with recent diagnosis of colon cancer, there is no convincing metastatic disease.  Presumed hemostasis clips right colon may be the site of the known colon cancer.  2. 2 mm nodule left upper lobe series 6, image 222.  Attention on follow-up.  For a solid nodule <6 mm, Fleischner Society 2017 guidelines recommend no routine follow up for a low risk patient, or follow-up with non-contrast chest CT at 12 months in a high risk patient.  3. Cholelithiasis without evidence of cholecystitis.  4. Hepatomegaly.  5. Small-sized hiatal hernia with reflux.  6. Borderline enlarged left paratracheal node.      4/11/2022 Tumor Markers     Patient's tumor was tested for the following markers: CEA.                                              Results of the tumor marker test revealed 4         Procedure(s) (LRB):  XI ROBOTIC COLECTOMY, RIGHT (lithotomy, eras low) (N/A)     Hospital Course:   Patient presented to [facility] with colon cancer. He underwent: Procedure(s) (LRB):  XI ROBOTIC COLECTOMY, RIGHT (lithotomy, eras low) (N/A). He tolerated the procedure well and was transferred to the floor after recovery from anesthesia. Please see the operative note for further procedure details. Vitals remained stable, and he was afebrile all throughout the post operative period. Labs were reviewed and electrolytes were replaced appropriately. Endocrinology was consulted for DM2 management as well as Podiatry for his chronic lower extremity ulcer. Physical exam was appropriate for post operative state.  Diet was advanced, and he was able to tolerate a low residual diet prior to discharge. He was ambulating at his baseline without difficulty and had normal bowel function prior to discharge. Patient was deemed suitable for discharge on 4 Days Post-Op. He was discharged in good condition with  medications and instructions as below. He voiced understanding of the instructions prior to discharge.     For more thorough information, please refer to the hospital record and operative report.      Consults:   Consults (From admission, onward)        Status Ordering Provider     Inpatient consult to Podiatry  Once        Provider:  (Not yet assigned)    BRITTON Yee     Inpatient consult to Endocrinology  Once        Provider:  (Not yet assigned)    Completed BRITTON MELGAR          Significant Diagnostic Studies: see chart    Pending Diagnostic Studies:     Procedure Component Value Units Date/Time    Basic Metabolic Panel [[id number]] Collected: 04/29/22 0236    Order Status: Sent Lab Status: In process Updated: 04/29/22 0535    Specimen: Blood     C-reactive protein [[id number]] Collected: 04/29/22 0236    Order Status: Sent Lab Status: In process Updated: 04/29/22 0534    Specimen: Blood     Specimen to Pathology, Surgery Gastrointestinal tract [[id number]] Collected: 04/25/22 1537    Order Status: Sent Lab Status: In process Updated: 04/25/22 1911    Specimen: Tissue         Final Active Diagnoses:    Diagnosis Date Noted POA    PRINCIPAL PROBLEM:  Colon adenocarcinoma [C18.9] 04/12/2022 Yes    Diabetic ulcer of right foot associated with type 2 diabetes mellitus, with fat layer exposed [E11.621, L97.512] 04/26/2022 Yes    Diabetic ulcer of right midfoot associated with diabetes mellitus due to underlying condition, with fat layer exposed [E08.621, L97.412] 07/12/2019 Yes    Type 2 diabetes mellitus with hyperglycemia, with long-term current use of insulin [E11.65, Z79.4] 05/03/2016 Not Applicable    Mixed hyperlipidemia [E78.2] 08/12/2014 Yes      Problems Resolved During this Admission:      Discharged Condition: good    Disposition: Home or Self Care    Follow Up:   Follow-up Information     Erik Stout MD Follow up on 5/17/2022.    Specialty: Colon and Rectal Surgery  Why: For wound  "re-check, pathology reviewed  Contact information:  [address]  [facility] [address]  [phone]                       Patient Instructions:      Diet Adult Regular     Leave dressing on - Keep it clean, dry, and intact until clinic visit     Lifting restrictions   Order Comments: Nothing over 10Lbs for the next 4-6 weeks     No driving until:   Order Comments: Off narcotic pain medications and able to operate safely without being limited due to pain     Activity as tolerated     Medications:  Reconciled Home Medications:      Medication List      START taking these medications    ondansetron 8 MG Tbdl  Commonly known as: ZOFRAN-ODT  Dissolve 1 tablet (8 mg total) by mouth every 8 (eight) hours as needed (Nausea).     oxyCODONE 5 MG immediate release tablet  Commonly known as: ROXICODONE  Take 1 tablet (5 mg total) by mouth every 6 (six) hours as needed for Pain (Not relieved with Tylenol).     OZEMPIC 0.25 mg or 0.5 mg(2 mg/1.5 mL) pen injector  Generic drug: semaglutide  Inject 0.25 mg into the skin every 7 days for 30 days, THEN 0.5 mg every 7 days.  Start taking on: April 28, 2022     tamsulosin 0.4 mg Cap  Commonly known as: FLOMAX  Take 2 capsules (0.8 mg total) by mouth once daily. for 14 days        CONTINUE taking these medications    acetaminophen 325 MG tablet  Commonly known as: TYLENOL  Take 2 tablets (650 mg total) by mouth every 6 (six) hours as needed.     aspirin 81 MG Chew  Chew and swallow 1 tablet (81 mg total) by mouth once daily.     BD ULTRA-FINE SASCHA PEN NEEDLE 32 gauge x 5/32" Ndle  Generic drug: pen needle, diabetic  Use 4 times a day     flu vacc [id number] 6mos up(PF) 60 mcg (15 mcg x 4)/0.5 mL Syrg  inject IM by [facility]     GAVILAX 17 gram/dose powder  Generic drug: polyethylene glycol  Take 8 doses of Miralax with  64 ounces of fluids the day before surgery.     insulin aspart U-100 100 unit/mL (3 mL) Inpn pen  Commonly known as: NovoLOG  Inject 8 units before meals plus scale " 150-200+2, 201-250+4, 251-300+6, 301-350+8, >350+10. Max daily 54 units.     LEVEMIR FLEXTOUCH U-100 INSULN 100 unit/mL (3 mL) Inpn pen  Generic drug: insulin detemir U-100  Inject 24 Units into the skin every evening.     lisinopriL 20 MG tablet  Commonly known as: PRINIVIL,ZESTRIL  Take 1 tablet (20 mg total) by mouth once daily.     metFORMIN 500 MG tablet  Commonly known as: GLUCOPHAGE  Take 2 tablets by mouth in the morning, and take 2 tablets by mouth at night.     metroNIDAZOLE 500 MG tablet  Commonly known as: FLAGYL  Take 1 tablet by mouth at 1:00 pm, 2:00 pm, and 11:00 pm the day before surgery.     neomycin 500 mg Tab  Commonly known as: MYCIFRADIN  Take 2 tablets by mouth at 1:00 pm, 2:00 pm, and 11:00 pm the day before surgery.     ONETOUCH DELICA LANCETS 33 gauge Misc  Generic drug: lancets     ONETOUCH ULTRA2 METER kit  Generic drug: blood-glucose meter     permethrin 5 % cream  Commonly known as: ELIMITE  Apply neck down at night for 1 application. Wash off in morning and Repeat in 1 week        STOP taking these medications    atorvastatin 80 MG tablet  Commonly known as: LIPITOR     clopidogreL 75 mg tablet  Commonly known as: PLAVIX     TRULICITY 1.5 mg/0.5 mL pen injector  Generic drug: dulaglutide            Fadi Perkins MD  General Surgery  [facility]

## 2022-05-06 ENCOUNTER — HOSPITAL ENCOUNTER (OUTPATIENT)
Dept: WOUND CARE | Facility: HOSPITAL | Age: 52
Discharge: HOME | End: 2022-05-06
Attending: PODIATRIST
Payer: MEDICARE

## 2022-05-06 VITALS
SYSTOLIC BLOOD PRESSURE: 178 MMHG | TEMPERATURE: 98 F | HEART RATE: 82 BPM | DIASTOLIC BLOOD PRESSURE: 91 MMHG | RESPIRATION RATE: 18 BRPM

## 2022-05-06 DIAGNOSIS — E08.621 DIABETIC ULCER OF RIGHT MIDFOOT ASSOCIATED WITH DIABETES MELLITUS DUE TO UNDERLYING CONDITION, WITH FAT LAYER EXPOSED: ICD-10-CM

## 2022-05-06 DIAGNOSIS — L97.529 DIABETIC ULCER OF LEFT FOOT: Primary | ICD-10-CM

## 2022-05-06 DIAGNOSIS — E11.621 DIABETIC ULCER OF LEFT FOOT: Primary | ICD-10-CM

## 2022-05-06 DIAGNOSIS — L97.412 DIABETIC ULCER OF RIGHT MIDFOOT ASSOCIATED WITH DIABETES MELLITUS DUE TO UNDERLYING CONDITION, WITH FAT LAYER EXPOSED: ICD-10-CM

## 2022-05-06 PROCEDURE — 99499 NO LOS: ICD-10-PCS | Mod: ,,, | Performed by: PODIATRIST

## 2022-05-06 PROCEDURE — 15275 SKIN SUB GRAFT FACE/NK/HF/G: CPT | Mod: ,,, | Performed by: PODIATRIST

## 2022-05-06 PROCEDURE — 15275 SKIN SUB GRAFT FACE/NK/HF/G: CPT | Performed by: PODIATRIST

## 2022-05-06 PROCEDURE — 99499 UNLISTED E&M SERVICE: CPT | Mod: ,,, | Performed by: PODIATRIST

## 2022-05-06 PROCEDURE — 15275 PR SKIN SUB GRAFT FACE/NK/HF/G UP TO 100 SQCM: ICD-10-PCS | Mod: ,,, | Performed by: PODIATRIST

## 2022-05-06 NOTE — PROGRESS NOTES
[facility]                Progress Note    Subjective:       Patient ID: Indio Ryder Jr. is a 51 y.o. male.    Chief Complaint: Wound Care    F/u wound care visit. Patient ambulatory to exam room with cam boot in use to left foot per order. Patient denies pain or discomfort at present. He is s/p hospital discharge following a right colectomy and he has now been living with his sister to assist in his post op care. Wound dressing to left foot intact with large amount of drainage noted. Wound to left distal plantar foot measuring smaller and wound to medial plantar foot measuring larger length and width. New wound noted to right plantar foot, patient states he noticed wound during his hospital stay.       Review of Systems   Constitutional: Negative for activity change, appetite change, chills, fatigue and fever.   HENT: Negative for hearing loss.    Eyes: Negative for photophobia and visual disturbance.   Respiratory: Negative for cough, chest tightness, shortness of breath and wheezing.    Cardiovascular: Positive for leg swelling. Negative for chest pain and palpitations.   Gastrointestinal: Negative for constipation, diarrhea, nausea and vomiting.   Endocrine: Negative for cold intolerance and heat intolerance.   Genitourinary: Negative for flank pain.   Musculoskeletal: Positive for gait problem and myalgias. Negative for neck pain and neck stiffness.   Skin: Positive for wound. Negative for color change.   Neurological: Positive for numbness. Negative for weakness, light-headedness and headaches.        + paresthesia    Psychiatric/Behavioral: Negative for sleep disturbance.         Objective:        Physical Exam  Vitals reviewed.   Constitutional:       Appearance: He is well-developed.   Cardiovascular:      Comments: dorsalis pedis and posterior tibial pulses are palpable bilaterally. Capillary refill time is within normal limits. + pedal hair growth          Musculoskeletal:         General: No tenderness. Normal range of motion.      Comments: Normal angle, base, station of gait. All toes without clubbing, cyanosis, or signs of ischemia.  No pain to palpation bilateral lower extremities.  Range of motion, stability, muscle strength, and muscle tone normal bilateral feet and legs.    Skin:     General: Skin is warm and dry.      Coloration: Skin is not ashen or cyanotic.      Findings: Wound (see below) present. No rash.      Nails: There is no clubbing.   Neurological:      Mental Status: He is alert and oriented to person, place, and time.      Sensory: No sensory deficit.      Comments: Diminished/loss of protective sensation all toes bilateral to 10 gram monofilament.     Psychiatric:         Behavior: Behavior normal.         Vitals:    05/06/22 1136   BP: (!) 178/91   Pulse: 82   Resp: 18   Temp: 97.9 °F (36.6 °C)       Assessment:           ICD-10-CM ICD-9-CM   1. Diabetic ulcer of left foot  E11.621 250.80    L97.529 707.15   2. Diabetic ulcer of right midfoot associated with diabetes mellitus due to underlying condition, with fat layer exposed  E08.621 249.80    L97.412 707.14            Wound 10/25/21 1500 Diabetic Ulcer Left plantar;distal Foot (Active)   10/25/21 1500    Pre-existing: Yes   Primary Wound Type: Diabetic ulc   Side: Left   Orientation: plantar;distal   Location: Foot   Wound Number:    Ankle-Brachial Index:    Pulses:    Removal Indication and Assessment:    Wound Outcome: Other   (Retired) Wound Type:    (Retired) Wound Length (cm):    (Retired) Wound Width (cm):    (Retired) Depth (cm):    Wound Description (Comments):    Removal Indications:    Wound Image   05/06/22 1100   Wound WDL ex 05/06/22 1100   Dressing Appearance Saturated 05/06/22 1100   Drainage Amount Moderate 05/06/22 1100   Drainage Characteristics/Odor Yellow;Malodorous 05/06/22 1100   Appearance Red 05/06/22 1100   Tissue loss description Partial thickness 05/06/22  1100   Black (%), Wound Tissue Color 0 % 05/06/22 1100   Red (%), Wound Tissue Color 100 % 05/06/22 1100   Yellow (%), Wound Tissue Color 0 % 05/06/22 1100   Periwound Area Macerated 05/06/22 1100   Wound Edges Defined 05/06/22 1100   Wound Length (cm) 0.3 cm 05/06/22 1100   Wound Width (cm) 0.3 cm 05/06/22 1100   Wound Depth (cm) 0.2 cm 05/06/22 1100   Wound Volume (cm^3) 0.018 cm^3 05/06/22 1100   Wound Surface Area (cm^2) 0.09 cm^2 05/06/22 1100   Care Cleansed with:;Antimicrobial agent;Sterile normal saline 05/06/22 1100   Dressing Changed 05/06/22 1100   Periwound Care Skin barrier film applied 05/06/22 1100   Compression Two layer compression 05/06/22 1100   Off Loading Football dressing;Off loading shoe 05/06/22 1100   Dressing Change Due 05/13/22 05/06/22 1100            Wound 04/08/22 1137 Diabetic Ulcer Left plantar;medial Foot (Active)   04/08/22 1137    Pre-existing: Yes   Primary Wound Type: Diabetic ulc   Side: Left   Orientation: plantar;medial   Location: Foot   Wound Number:    Ankle-Brachial Index:    Pulses:    Removal Indication and Assessment:    Wound Outcome:    (Retired) Wound Type:    (Retired) Wound Length (cm):    (Retired) Wound Width (cm):    (Retired) Depth (cm):    Wound Description (Comments):    Removal Indications:    Wound Image   05/06/22 1100   Wound WDL ex 05/06/22 1100   Dressing Appearance Saturated 05/06/22 1100   Drainage Amount Moderate 05/06/22 1100   Drainage Characteristics/Odor Yellow;Malodorous 05/06/22 1100   Appearance Pink 05/06/22 1100   Tissue loss description Partial thickness 05/06/22 1100   Black (%), Wound Tissue Color 0 % 05/06/22 1100   Red (%), Wound Tissue Color 100 % 05/06/22 1100   Yellow (%), Wound Tissue Color 0 % 05/06/22 1100   Periwound Area Macerated 05/06/22 1100   Wound Edges Defined 05/06/22 1100   Wound Length (cm) 1 cm 05/06/22 1100   Wound Width (cm) 1.2 cm 05/06/22 1100   Wound Depth (cm) 0.3 cm 05/06/22 1100   Wound Volume (cm^3) 0.36 cm^3  05/06/22 1100   Wound Surface Area (cm^2) 1.2 cm^2 05/06/22 1100   Care Cleansed with:;Antimicrobial agent;Sterile normal saline 05/06/22 1100   Dressing Changed 05/06/22 1100   Periwound Care Skin barrier film applied 05/06/22 1100   Compression Two layer compression 05/06/22 1100   Off Loading Football dressing;Off loading shoe 05/06/22 1100   Dressing Change Due 05/13/22 05/06/22 1100            Wound 05/06/22 1142 Diabetic Ulcer Right Foot (Active)   05/06/22 1142    Pre-existing: Yes   Primary Wound Type: Diabetic ulc   Side: Right   Orientation:    Location: Foot   Wound Number:    Ankle-Brachial Index:    Pulses:    Removal Indication and Assessment:    Wound Outcome:    (Retired) Wound Type:    (Retired) Wound Length (cm):    (Retired) Wound Width (cm):    (Retired) Depth (cm):    Wound Description (Comments):    Removal Indications:    Wound Image   05/06/22 1100   Wound WDL ex 05/06/22 1100   Dressing Appearance Intact 05/06/22 1100   Drainage Amount None 05/06/22 1100   Appearance Red 05/06/22 1100   Tissue loss description Partial thickness 05/06/22 1100   Black (%), Wound Tissue Color 0 % 05/06/22 1100   Red (%), Wound Tissue Color 100 % 05/06/22 1100   Yellow (%), Wound Tissue Color 0 % 05/06/22 1100   Periwound Area Dry 05/06/22 1100   Wound Edges Callused 05/06/22 1100   Wound Length (cm) 0.2 cm 05/06/22 1100   Wound Width (cm) 0.3 cm 05/06/22 1100   Wound Depth (cm) 0.1 cm 05/06/22 1100   Wound Volume (cm^3) 0.006 cm^3 05/06/22 1100   Wound Surface Area (cm^2) 0.06 cm^2 05/06/22 1100   Care Cleansed with:;Antimicrobial agent;Sterile normal saline 05/06/22 1100   Dressing Changed 05/06/22 1100   Periwound Care Skin barrier film applied 05/06/22 1100   Dressing Change Due 05/13/22 05/06/22 1100           Plan:            Patient instructed to continue elevating BLE as much as possible and monitor sodium and surgar intake, patient verbalized understanding.      NEOX OP REPORT    SURGEON: Marivel Peterson,  DPM  ASSITANT: None  PRE-OP DX: Ulceration secondary to diabetes mellitus and peripheral neuropathy with sluggish response to prior treatment.  POST-OP DX: same.  ANESTHESIA: Pt. has loss of sensation and therefore no anesthesia was required.  HEMOSTASIS: pressure  EBL: less than 10cc.    Time Out performed to verify patient and site and consent obtained.    Procedure: The patient was seen in the wound clinic room and placed in supine position on the treatment table.  Attention was directed to the ulcer which was located on the left foot, where an ulceration measuring  1.0x1.2x0.2 cm is noted.  The ulceration was covered with fibrin and a mild callused rim with periwound maceration.  No acute signs of infection were noted.  The wound is nontender.  Utilizing a 3 mm curette, I debrided 100% of the wound full-thickness through subcutaneous tissue to excise viable and nonviable tissue outside the wound margins.  Patient tolerated well.  The wound was flushed with sterile saline.  There was minimal bleeding with debridement which was well controlled with direct pressure.  A NEOX sheet was then inspected and noted to be in acceptable.  It was then removed sterilely.  The sheet was then fenestrated with the scalpel and then cut and sized and shaped to the wound.  I utilized 80% of the 6x3 cm neox1K with overlapping edges against the wound and discarded the remainder.  The NEOX was adhered down with saline soaked cotton swabs and then secured in place 3-0 prolene and covered with adaptic touch non-adherent layer and application of offloading dressing.  The patient having tolerated the procedure well left the clinic with all vital signs stable and vascular status intact to all digits of both feet.     Short-term goals including promoting granulation and epithelialization of the wound. Long term goals include maintaining a healed wound with appropriate offloading and good medical management per internist.    Orders Placed  This Encounter    Change dressing       Orders Placed This Encounter   Procedures    Change dressing     Clean wounds with NS.     Left foot: Cavilon/benzoin to plantar foot and periwound. Neox with sutures, adaptic touch, steristrips per MD. Aquacel cut to fit then Drawtex (Small). Custom fit foam pad to plantar foot. Mextra long x1 secured with medipore tape. Football (cast padding x3). Coflex calamine. CAM boot.     Right foot: Cavilon to periwound, cover with tegafoam.        Follow up in about 1 week (around 5/13/2022) for wound care visit.

## 2022-05-09 LAB
POCT GLUCOSE: 177 MG/DL (ref 70–110)
POCT GLUCOSE: 331 MG/DL (ref 70–110)

## 2022-05-13 ENCOUNTER — HOSPITAL ENCOUNTER (OUTPATIENT)
Dept: WOUND CARE | Facility: HOSPITAL | Age: 52
Discharge: HOME | End: 2022-05-13
Attending: PODIATRIST
Payer: MEDICARE

## 2022-05-13 VITALS — TEMPERATURE: 98 F | DIASTOLIC BLOOD PRESSURE: 97 MMHG | SYSTOLIC BLOOD PRESSURE: 165 MMHG | HEART RATE: 89 BPM

## 2022-05-13 DIAGNOSIS — L97.412 DIABETIC ULCER OF RIGHT MIDFOOT ASSOCIATED WITH DIABETES MELLITUS DUE TO UNDERLYING CONDITION, WITH FAT LAYER EXPOSED: ICD-10-CM

## 2022-05-13 DIAGNOSIS — E11.621 DIABETIC ULCER OF LEFT MIDFOOT ASSOCIATED WITH TYPE 2 DIABETES MELLITUS, WITH FAT LAYER EXPOSED: ICD-10-CM

## 2022-05-13 DIAGNOSIS — E08.621 DIABETIC ULCER OF RIGHT MIDFOOT ASSOCIATED WITH DIABETES MELLITUS DUE TO UNDERLYING CONDITION, WITH FAT LAYER EXPOSED: ICD-10-CM

## 2022-05-13 DIAGNOSIS — L97.529 DIABETIC ULCER OF LEFT FOOT: Primary | ICD-10-CM

## 2022-05-13 DIAGNOSIS — L97.422 DIABETIC ULCER OF LEFT MIDFOOT ASSOCIATED WITH TYPE 2 DIABETES MELLITUS, WITH FAT LAYER EXPOSED: ICD-10-CM

## 2022-05-13 DIAGNOSIS — E11.621 DIABETIC ULCER OF LEFT FOOT: Primary | ICD-10-CM

## 2022-05-13 PROCEDURE — 29581 APPL MULTLAYER CMPRN SYS LEG: CPT

## 2022-05-13 PROCEDURE — 99213 OFFICE O/P EST LOW 20 MIN: CPT | Mod: ,,, | Performed by: PODIATRIST

## 2022-05-13 PROCEDURE — 99213 PR OFFICE/OUTPT VISIT, EST, LEVL III, 20-29 MIN: ICD-10-PCS | Mod: ,,, | Performed by: PODIATRIST

## 2022-05-13 NOTE — PROGRESS NOTES
[facility]                Progress Note    Subjective:       Patient ID: Indio Ryder Jr. is a 51 y.o. male.    Chief Complaint: No chief complaint on file.    Follow up wound care visit. Patient ambulated to exam room unaided with prescribed CAM boot on LLE. No c/o pain at present. Dressing's intact with moderate amounts of serosanguineous, malodor drainage from Left Foot wounds. Bilateral feet feel sweaty, and clammy, patient reports sitting outside for 8 hours a day.      Review of Systems   Constitutional: Negative for activity change, appetite change, chills, fatigue and fever.   HENT: Negative for hearing loss.    Eyes: Negative for photophobia and visual disturbance.   Respiratory: Negative for cough, chest tightness, shortness of breath and wheezing.    Cardiovascular: Positive for leg swelling. Negative for chest pain and palpitations.   Gastrointestinal: Negative for constipation, diarrhea, nausea and vomiting.   Endocrine: Negative for cold intolerance and heat intolerance.   Genitourinary: Negative for flank pain.   Musculoskeletal: Positive for gait problem and myalgias. Negative for neck pain and neck stiffness.   Skin: Positive for wound. Negative for color change.   Neurological: Positive for numbness. Negative for weakness, light-headedness and headaches.        + paresthesia    Psychiatric/Behavioral: Negative for sleep disturbance.         Objective:        Physical Exam  Vitals reviewed.   Constitutional:       Appearance: He is well-developed.   Cardiovascular:      Comments: dorsalis pedis and posterior tibial pulses are palpable bilaterally. Capillary refill time is within normal limits. + pedal hair growth         Musculoskeletal:         General: No tenderness. Normal range of motion.      Comments: Normal angle, base, station of gait. All toes without clubbing, cyanosis, or signs of ischemia.  No pain to palpation bilateral lower  extremities.  Range of motion, stability, muscle strength, and muscle tone normal bilateral feet and legs.    Skin:     General: Skin is warm and dry.      Coloration: Skin is not ashen or cyanotic.      Findings: Wound (see below) present. No rash.      Nails: There is no clubbing.   Neurological:      Mental Status: He is alert and oriented to person, place, and time.      Sensory: No sensory deficit.      Comments: Diminished/loss of protective sensation all toes bilateral to 10 gram monofilament.     Psychiatric:         Behavior: Behavior normal.         Vitals:    05/13/22 1055   BP: (!) 165/97   Pulse: 89   Temp: 97.8 °F (36.6 °C)       Assessment:           ICD-10-CM ICD-9-CM   1. Diabetic ulcer of left foot  E11.621 250.80    L97.529 707.15   2. Diabetic ulcer of left midfoot associated with type 2 diabetes mellitus, with fat layer exposed  E11.621 250.80    L97.422 707.14   3. Diabetic ulcer of right midfoot associated with diabetes mellitus due to underlying condition, with fat layer exposed  E08.621 249.80    L97.412 707.14            Wound 10/25/21 1500 Diabetic Ulcer Left plantar;distal Foot (Active)   10/25/21 1500    Pre-existing: Yes   Primary Wound Type: Diabetic ulc   Side: Left   Orientation: plantar;distal   Location: Foot   Wound Number:    Ankle-Brachial Index:    Pulses:    Removal Indication and Assessment:    Wound Outcome: Other   (Retired) Wound Type:    (Retired) Wound Length (cm):    (Retired) Wound Width (cm):    (Retired) Depth (cm):    Wound Description (Comments):    Removal Indications:    Wound Image   05/13/22 1400   Wound WDL ex 05/13/22 1400   Dressing Appearance Intact;Moist drainage 05/13/22 1400   Drainage Amount Moderate 05/13/22 1400   Drainage Characteristics/Odor Serosanguineous;Malodorous 05/13/22 1400   Appearance Sutures intact 05/13/22 1400   Tissue loss description Partial thickness 05/13/22 1400   Periwound Area Dry 05/13/22 1400   Wound Edges Defined 05/13/22 1400    Care Cleansed with:;Antimicrobial agent;Sterile normal saline 05/13/22 1400   Dressing Changed 05/13/22 1400   Periwound Care Skin barrier film applied 05/13/22 1400   Compression Two layer compression 05/13/22 1400   Off Loading Football dressing;Off loading shoe 05/13/22 1400   Dressing Change Due 05/18/22 05/13/22 1400            Wound 04/08/22 1137 Diabetic Ulcer Left plantar;medial Foot (Active)   04/08/22 1137    Pre-existing: Yes   Primary Wound Type: Diabetic ulc   Side: Left   Orientation: plantar;medial   Location: Foot   Wound Number:    Ankle-Brachial Index:    Pulses:    Removal Indication and Assessment:    Wound Outcome:    (Retired) Wound Type:    (Retired) Wound Length (cm):    (Retired) Wound Width (cm):    (Retired) Depth (cm):    Wound Description (Comments):    Removal Indications:    Wound Image   05/13/22 1400   Wound WDL ex 05/13/22 1400   Dressing Appearance Intact;Moist drainage 05/13/22 1400   Drainage Amount Moderate 05/13/22 1400   Drainage Characteristics/Odor Serosanguineous;Malodorous 05/13/22 1400   Appearance Sutures intact 05/13/22 1400   Tissue loss description Partial thickness 05/13/22 1400   Red (%), Wound Tissue Color 100 % 05/13/22 1400   Periwound Area Dry 05/13/22 1400   Wound Edges Defined 05/13/22 1400   Care Cleansed with:;Antimicrobial agent;Sterile normal saline 05/13/22 1400   Dressing Changed 05/13/22 1400   Periwound Care Skin barrier film applied 05/13/22 1400   Compression Two layer compression 05/13/22 1400   Off Loading Football dressing;Off loading shoe 05/13/22 1400   Dressing Change Due 05/18/22 05/13/22 1400            Wound 05/06/22 1142 Diabetic Ulcer Right Foot (Active)   05/06/22 1142    Pre-existing: Yes   Primary Wound Type: Diabetic ulc   Side: Right   Orientation:    Location: Foot   Wound Number:    Ankle-Brachial Index:    Pulses:    Removal Indication and Assessment:    Wound Outcome:    (Retired) Wound Type:    (Retired) Wound Length (cm):     (Retired) Wound Width (cm):    (Retired) Depth (cm):    Wound Description (Comments):    Removal Indications:    Wound Image   05/13/22 1400   Wound WDL ex 05/13/22 1400   Dressing Appearance Intact;Dry 05/13/22 1400   Drainage Amount None 05/13/22 1400   Appearance Red;Moist 05/13/22 1400   Tissue loss description Partial thickness 05/13/22 1400   Red (%), Wound Tissue Color 100 % 05/13/22 1400   Periwound Area Dry 05/13/22 1400   Wound Edges Callused 05/13/22 1400   Wound Length (cm) 0.3 cm 05/13/22 1400   Wound Width (cm) 0.2 cm 05/13/22 1400   Wound Depth (cm) 0.1 cm 05/13/22 1400   Wound Volume (cm^3) 0.006 cm^3 05/13/22 1400   Wound Surface Area (cm^2) 0.06 cm^2 05/13/22 1400   Care Cleansed with:;Antimicrobial agent;Sterile normal saline 05/13/22 1400   Dressing Changed 05/13/22 1400   Periwound Care Skin barrier film applied 05/13/22 1400   Dressing Change Due 05/18/22 05/13/22 1400           Plan:              Advised to stay indoors where air conditioned and to allow feet to stay dry. Right foot wound measuring approximately the same as previous. Left foot wounds still have skin graft in place.     Wound care done as per order, RTC on Wednseday  for nurse visit and 1 week to see MD.     Orders Placed This Encounter   Procedures    Change dressing     Clean wounds with NS.   Left foot: Cavilon/Benzoin to plantar foot and periwound. Neox with sutures, Adaptic touch, steristrips per MD. Custom fit foam pad to plantar foot. Aquacel (cut to fit), then Drawtex (cut to fit).  Mextra long x1 (if unavailable, Abram Foam) secured with medipore tape. Football (cast padding x3). Coflex calamine. CAM boot.     Right foot: Cavilon/Benzoin to periwound, Drawtex to wound bed, cover with tegafoam.    Change at nurse visit.        Follow up in about 5 days (around 5/18/2022) for wound care.

## 2022-05-16 NOTE — PROGRESS NOTES
[facility]  Colon and Rectal Surgery    [address]  [place], [place]  Tel: [phone]  Fax: [phone]  [email or web address]   MD Fadi Chapman MD Brian Kann, MD W. Forrest Johnston, MD Matthew Giglia, MD Jennifer Paruch, MD William Kethman, MD Danielle Kay, MD     Patient name: Indio Ryder Jr.   YOB: 1970   MRN: [id number]  Date of visit: 05/17/2022    Dear Navid Thakur and Garland,    It was a pleasure seeing Mr. Ryder in the Colon and Rectal Surgery clinic here at [facility].     As you know, Mr. Ryder is a 51 year old man with a history of DM and HLD who presents for evaluation of colon adenocarcinoma. He underwent a colonoscopy on 1/19/2022 with Dr. Haile and was found to have a sessile hepatic flexure polyp (single tattoo at that time) > referred to Dr. Haque for EMR (3/21/2022 - initial colonoscopy report was mislabeled, addendum made) - removed in piecemeal fashion but was described as complete resection. Of note, he carries a diagnosis of UC but he said that this was made over a decade ago when he was taking NSAIDs frequently - he has not had any issues or been treated for this in over a decade. He is asymptomatic, and denies any family history of CRC (sister had breast cancer). He is being treated for a lower extremity ulcer - last HgbA1c 8.1 9/2021.    Oncology History   Colon adenocarcinoma   3/30/2022 Pathology Significant Finding    COLON, HEPATIC FLEXURE POLYP, EMR:   -Invasive adenocarcinoma, moderately differentiated, arising in a tubular   adenoma.   TUMOR SITE:  Hepatic flexure.   HISTOLOGIC TYPE:  Adenocarcinoma.   HISTOLOGIC GRADE:  G2 moderately differentiated.   SIZE OF INVASIVE TUMOR:  At least 0.3 mm.   TUMOR EXTENT:  Invades the submucosa.   LYMPHOVASCULAR INVASION:  Not identified.   POLYP SIZE:  Greatest dimensions:  1.4 cm, see comment.   MARGINS:  Margins are positive for high grade dysplasia.                         No carcinoma is identified at cauterized margins.   COMMENT:   It is difficult measure the exact size of the invasive tumor and polyp   configuration secondary to the fragmented nature of the specimen and   tangential sectioning.      3/30/2022 Initial Diagnosis    Colon adenocarcinoma     4/11/2022 Imaging Significant Findings    CT CAP  1. In this patient with recent diagnosis of colon cancer, there is no convincing metastatic disease.  Presumed hemostasis clips right colon may be the site of the known colon cancer.  2. 2 mm nodule left upper lobe series 6, image 222.  Attention on follow-up.  For a solid nodule <6 mm, Fleischner Society 2017 guidelines recommend no routine follow up for a low risk patient, or follow-up with non-contrast chest CT at 12 months in a high risk patient.  3. Cholelithiasis without evidence of cholecystitis.  4. Hepatomegaly.  5. Small-sized hiatal hernia with reflux.  6. Borderline enlarged left paratracheal node.     4/11/2022 Tumor Markers    Patient's tumor was tested for the following markers: CEA.                                              Results of the tumor marker test revealed 4     4/25/2022 Surgery    Robotic-assisted right hemicolectomy     4/29/2022 Pathology Significant Finding    RIGHT COLON TERMINAL ILEUM AND APPENDIX, RESECTION:   Focal residual adenoma adjacent to previous biopsy site.   No invasive carcinoma identified.   Margins of resection, negative for dysplasia/carcinoma.   Appendix with fibrous obliteration of lumen.   Seventeen (17) lymph nodes, negative for tumor     4/29/2022 Cancer Staged    Staging form: Colon and Rectum, AJCC 8th Edition  - Pathologic stage from 4/29/2022: Stage I (pT1, pN0, cM0)       5/17/2022  Here today for post-operative follow-up. He is doing well - normal bowel function, no fevers, or chills. No complaints today.    The patient was informed of the availability of a certified  without charge. A  certified  was not necessary for this visit.    Review of Systems  See pertinent review of systems above    Past Medical History:   Diagnosis Date    Actinomyces infection 01/17/2017    Right foot    Colon adenocarcinoma 04/12/2022    Diabetic ketoacidosis without coma associated with type 2 diabetes mellitus 05/30/2017    Diabetic ulcer of right foot associated with type 2 diabetes mellitus 06/03/2015    Disorder of kidney and ureter     Essential hypertension 06/06/2013    Group B streptococcal infection 01/13/2017    Mixed hyperlipidemia 08/12/2014    Septic arthritis of left foot 04/28/2021    Shoulder impingement 08/12/2014    Subacute osteomyelitis of right foot 01/12/2017    Streptococcus agalactiae, Actinomyces odontolyticus    Traumatic amputation of fifth toe of right foot 07/02/2015    Type 2 diabetes mellitus with diabetic neuropathy, with long-term current use of insulin 05/03/2016     Past Surgical History:   Procedure Laterality Date    COLONOSCOPY Right 1/19/2022    Procedure: COLONOSCOPY;  Surgeon: Tj Haile MD;  Location: [facility] (4TH FLR);  Service: Endoscopy;  Laterality: Right;  previous order un-usable/poor prep 1/18/22  RAPID COVID test arrival 12:20  instructions handed to pt- sm    COLONOSCOPY N/A 3/21/2022    Procedure: COLONOSCOPY;  Surgeon: Sheng Haque MD;  Location: [facility] (2ND FLR);  Service: Endoscopy;  Laterality: N/A;  Colonoscopy with AES for hepatix flexure polyp removal, 2nd floor  Pt is fully vaccinated-DS  Pt prescribed Plavix by Dr. Stahl in Jan. 2022, however pt states that he never started taking it-DS  3/16/22-Instructions sent via portal-DS    DEBRIDEMENT OF FOOT Left 7/14/2019    Procedure: DEBRIDEMENT, FOOT, with left 5th ray amputation;  Surgeon: Sis Hickman DPM;  Location: [facility] OR 2ND FLR;  Service: Podiatry;  Laterality: Left;    DEBRIDEMENT OF FOOT Left 7/17/2019    Procedure: DEBRIDEMENT, FOOT with leftt 5th ray  partial amputation with Neox Graft;  Surgeon: Mai Burrell DPM;  Location: [facility] OR 2ND FLR;  Service: Podiatry;  Laterality: Left;  t    DEBRIDEMENT OF FOOT Bilateral 4/29/2021    Procedure: DEBRIDEMENT, FOOT;  Surgeon: Mai Burrell DPM;  Location: [facility] OR 1ST FLR;  Service: Podiatry;  Laterality: Bilateral;  Graft application    TOE AMPUTATION Right 06/05/2015    TOE AMPUTATION Right 01/19/2017    XI ROBOTIC COLECTOMY, RIGHT N/A 4/25/2022    Procedure: XI ROBOTIC COLECTOMY, RIGHT (lithotomy, eras low);  Surgeon: Erik Stout MD;  Location: [facility] OR 2ND FLR;  Service: Colon and Rectal;  Laterality: N/A;  Paruch to Goodwin    XI ROBOTIC COLECTOMY, RIGHT  4/25/2022    Procedure: ;  Surgeon: Erik Stout MD;  Location: [facility] OR 2ND FLR;  Service: Colon and Rectal;;     Family History   Adopted: Yes   Problem Relation Age of Onset    Hypertension Mother     Cancer Mother         breast    Diabetes Mother     Hypertension Father     Cataracts Father     Heart disease Father         mi    Diabetes Sister     No Known Problems Brother     Heart disease Sister         MI    No Known Problems Sister     Hypertension Maternal Aunt     No Known Problems Maternal Uncle     No Known Problems Paternal Aunt     No Known Problems Paternal Uncle     No Known Problems Maternal Grandmother     No Known Problems Maternal Grandfather     No Known Problems Paternal Grandmother     No Known Problems Paternal Grandfather     Amblyopia Neg Hx     Blindness Neg Hx     Glaucoma Neg Hx     Macular degeneration Neg Hx     Retinal detachment Neg Hx     Strabismus Neg Hx     Stroke Neg Hx     Thyroid disease Neg Hx      Social History     Tobacco Use    Smoking status: Never Smoker    Smokeless tobacco: Never Used   Substance Use Topics    Alcohol use: No    Drug use: No     Review of patient's allergies indicates:  No Known Allergies    Current Outpatient Medications on File  "Prior to Visit   Medication Sig Dispense Refill    acetaminophen (TYLENOL) 325 MG tablet Take 2 tablets (650 mg total) by mouth every 6 (six) hours as needed. 30 tablet 0    aspirin 81 MG Chew Chew and swallow 1 tablet (81 mg total) by mouth once daily. 90 tablet 3    flu vacc [id number] 6mos up,PF, 60 mcg (15 mcg x 4)/0.5 mL Syrg inject IM by [facility] 0.5 mL 0    insulin aspart U-100 (NOVOLOG) 100 unit/mL (3 mL) InPn pen Inject 8 units before meals plus scale 150-200+2, 201-250+4, 251-300+6, 301-350+8, >350+10. Max daily 54 units. 15 mL 6    insulin detemir U-100 (LEVEMIR FLEXTOUCH) 100 unit/mL (3 mL) SubQ InPn pen Inject 24 Units into the skin every evening. 15 mL 6    lisinopriL (PRINIVIL,ZESTRIL) 20 MG tablet Take 1 tablet (20 mg total) by mouth once daily. 90 tablet 3    metFORMIN (GLUCOPHAGE) 500 MG tablet Take 2 tablets by mouth in the morning, and take 2 tablets by mouth at night. (Patient taking differently: Take 2 tablets by mouth in the morning, and take 2 tablets by mouth at night.) 360 tablet 3    metroNIDAZOLE (FLAGYL) 500 MG tablet Take 1 tablet by mouth at 1:00 pm, 2:00 pm, and 11:00 pm the day before surgery. 3 tablet 0    neomycin (MYCIFRADIN) 500 mg Tab Take 2 tablets by mouth at 1:00 pm, 2:00 pm, and 11:00 pm the day before surgery. 6 tablet 0    ondansetron (ZOFRAN-ODT) 8 MG TbDL Dissolve 1 tablet (8 mg total) by mouth every 8 (eight) hours as needed (Nausea). 21 tablet 0    ONETOUCH DELICA LANCETS 33 gauge Misc       ONETOUCH ULTRA2 kit       pen needle, diabetic (BD SASCHA 2ND GEN PEN NEEDLE) 32 gauge x 5/32" Ndle Use 4 times a day (Patient taking differently: Use 4 times a day) 400 each 3    permethrin (ELIMITE) 5 % cream Apply neck down at night for 1 application. Wash off in morning and Repeat in 1 week 60 g 1    polyethylene glycol (GLYCOLAX) 17 gram/dose powder Take 8 doses of Miralax with  64 ounces of fluids the day before surgery. 1020 g 0    semaglutide (OZEMPIC) 0.25 mg or " "0.5 mg(2 mg/1.5 mL) pen injector Inject 0.25 mg into the skin every 7 days for 30 days, THEN 0.5 mg every 7 days. 3 pen 0    oxyCODONE (ROXICODONE) 5 MG immediate release tablet Take 1 tablet (5 mg total) by mouth every 6 (six) hours as needed for Pain (Not relieved with Tylenol). (Patient not taking: Reported on 5/17/2022) 21 tablet 0    tamsulosin (FLOMAX) 0.4 mg Cap Take 2 capsules (0.8 mg total) by mouth once daily. for 14 days 28 capsule 0    [DISCONTINUED] atorvastatin (LIPITOR) 80 MG tablet Take 1 tablet (80 mg total) by mouth once daily. (Patient not taking: No sig reported) 90 tablet 0    [DISCONTINUED] clopidogreL (PLAVIX) 75 mg tablet Take 1 tablet (75 mg total) by mouth once daily. (Patient not taking: No sig reported) 30 tablet 11     Current Facility-Administered Medications on File Prior to Visit   Medication Dose Route Frequency Provider Last Rate Last Admin    heparin, porcine (PF) 100 unit/mL injection flush 10 Units  10 Units Intravenous PRN Esthela Diggs MD   500 Units at 05/19/21 1308     Physical Examination  BP (!) 180/104 (BP Location: Left arm, Patient Position: Sitting, BP Method: Large (Automatic))   Pulse 88   Ht 6' 1" (1.854 m)   Wt 102.9 kg (226 lb 12.8 oz)   BMI 29.92 kg/m²      A chaperone was present for the physical examination.    Constitutional: well developed, no cough, no dyspnea, alert, and no acute distress    Head: Normocephalic, no lesions, without obvious abnormality  Eye: Normal external eye, conjunctiva, and lids  Cardiovascular: regular rate and regular rhythm  Respiratory: normal air entry  Gastrointestinal: soft, non-tender, incisions are healing well, no evidence of infection or hernia  Musculoskeletal: full range of motion without pain  Neurologic: alert, oriented, normal speech, no focal findings or movement disorder noted  Psychiatric: appropriate, normal mood    Assessment and Plan of Care    Thank you again for referring Mr. Ryder to my care. In " summary, Mr. Ryder is a 51 year old man presenting with colon adenocarcinoma arising from sessile polyp removed in piecemeal fashion, margins positive for high-grade dysplasia > underwent right hemicolectomy. We discussed treatment options and have provided the following recommendations:    1. We had a long discussion regarding the pathology and surveillance for colon cancer. For patients with Stage I disease, a colonoscopy should be repeated at 1 year after surgery - if an advanced adenoma is detected then repeat exam should be performed at 1 year, otherwise repeat in 3 years and then every 5 years.  2. Will obtain post-operative CEA and case request for colonoscopy at 1 year placed (3/2023)  3. Encouraged improved DM control and optimization of co-morbidities  4. Follow-up in 3 months    Please do not hesitate to contact me if you have any questions.      Erik Stout MD - Staff Surgeon  Department of Colon & Rectal Surgery  [facility]

## 2022-05-17 ENCOUNTER — LAB VISIT (OUTPATIENT)
Dept: LAB | Facility: HOSPITAL | Age: 52
End: 2022-05-17
Attending: STUDENT IN AN ORGANIZED HEALTH CARE EDUCATION/TRAINING PROGRAM
Payer: MEDICARE

## 2022-05-17 ENCOUNTER — OFFICE VISIT (OUTPATIENT)
Dept: SURGERY | Facility: CLINIC | Age: 52
End: 2022-05-17
Payer: MEDICARE

## 2022-05-17 VITALS
DIASTOLIC BLOOD PRESSURE: 104 MMHG | WEIGHT: 226.81 LBS | HEART RATE: 88 BPM | HEIGHT: 73 IN | BODY MASS INDEX: 30.06 KG/M2 | SYSTOLIC BLOOD PRESSURE: 180 MMHG

## 2022-05-17 DIAGNOSIS — C18.9 COLON ADENOCARCINOMA: ICD-10-CM

## 2022-05-17 DIAGNOSIS — C18.9 COLON ADENOCARCINOMA: Primary | ICD-10-CM

## 2022-05-17 LAB — CEA SERPL-MCNC: 2.9 NG/ML (ref 0–5)

## 2022-05-17 PROCEDURE — 99024 POSTOP FOLLOW-UP VISIT: CPT | Mod: POP,,, | Performed by: STUDENT IN AN ORGANIZED HEALTH CARE EDUCATION/TRAINING PROGRAM

## 2022-05-17 PROCEDURE — 99213 OFFICE O/P EST LOW 20 MIN: CPT | Mod: PBBFAC | Performed by: STUDENT IN AN ORGANIZED HEALTH CARE EDUCATION/TRAINING PROGRAM

## 2022-05-17 PROCEDURE — 36415 COLL VENOUS BLD VENIPUNCTURE: CPT | Performed by: STUDENT IN AN ORGANIZED HEALTH CARE EDUCATION/TRAINING PROGRAM

## 2022-05-17 PROCEDURE — 99999 PR PBB SHADOW E&M-EST. PATIENT-LVL III: CPT | Mod: PBBFAC,,, | Performed by: STUDENT IN AN ORGANIZED HEALTH CARE EDUCATION/TRAINING PROGRAM

## 2022-05-17 PROCEDURE — 99999 PR PBB SHADOW E&M-EST. PATIENT-LVL III: ICD-10-PCS | Mod: PBBFAC,,, | Performed by: STUDENT IN AN ORGANIZED HEALTH CARE EDUCATION/TRAINING PROGRAM

## 2022-05-17 PROCEDURE — 99024 PR POST-OP FOLLOW-UP VISIT: ICD-10-PCS | Mod: POP,,, | Performed by: STUDENT IN AN ORGANIZED HEALTH CARE EDUCATION/TRAINING PROGRAM

## 2022-05-17 PROCEDURE — 82378 CARCINOEMBRYONIC ANTIGEN: CPT | Performed by: STUDENT IN AN ORGANIZED HEALTH CARE EDUCATION/TRAINING PROGRAM

## 2022-05-18 ENCOUNTER — HOSPITAL ENCOUNTER (OUTPATIENT)
Dept: WOUND CARE | Facility: HOSPITAL | Age: 52
Discharge: HOME | End: 2022-05-18
Attending: FAMILY MEDICINE
Payer: MEDICARE

## 2022-05-18 VITALS — HEART RATE: 85 BPM | SYSTOLIC BLOOD PRESSURE: 180 MMHG | DIASTOLIC BLOOD PRESSURE: 105 MMHG

## 2022-05-18 DIAGNOSIS — E11.621 DIABETIC ULCER OF LEFT FOOT: Primary | ICD-10-CM

## 2022-05-18 DIAGNOSIS — L97.529 DIABETIC ULCER OF LEFT FOOT: Primary | ICD-10-CM

## 2022-05-18 PROCEDURE — 29581 APPL MULTLAYER CMPRN SYS LEG: CPT

## 2022-05-18 NOTE — PROGRESS NOTES
[facility] [address] [place]  [address]  Nurse Visit    Subjective:       Patient seen in clinic today.  Dressing changed as ordered.      Assessment:          Wound 10/25/21 1500 Diabetic Ulcer Left plantar;distal Foot (Active)   10/25/21 1500    Pre-existing: Yes   Primary Wound Type: Diabetic ulc   Side: Left   Orientation: plantar;distal   Location: Foot   Wound Number:    Ankle-Brachial Index:    Pulses:    Removal Indication and Assessment:    Wound Outcome: Other   (Retired) Wound Type:    (Retired) Wound Length (cm):    (Retired) Wound Width (cm):    (Retired) Depth (cm):    Wound Description (Comments):    Removal Indications:    Wound WDL ex 05/18/22 0800   Dressing Appearance Intact;Moist drainage 05/18/22 0800   Drainage Amount Small 05/18/22 0800   Appearance Sutures intact 05/18/22 0800   Care Cleansed with:;Soap and water;Sterile normal saline 05/18/22 0800   Dressing Changed 05/18/22 0800   Periwound Care Skin barrier film applied 05/18/22 0800   Compression Two layer compression 05/18/22 0800   Off Loading Football dressing;Off loading shoe 05/18/22 0800   Dressing Change Due 05/20/22 05/18/22 0800            Wound 04/08/22 1137 Diabetic Ulcer Left plantar;medial Foot (Active)   04/08/22 1137    Pre-existing: Yes   Primary Wound Type: Diabetic ulc   Side: Left   Orientation: plantar;medial   Location: Foot   Wound Number:    Ankle-Brachial Index:    Pulses:    Removal Indication and Assessment:    Wound Outcome:    (Retired) Wound Type:    (Retired) Wound Length (cm):    (Retired) Wound Width (cm):    (Retired) Depth (cm):    Wound Description (Comments):    Removal Indications:    Wound WDL ex 05/18/22 0800   Dressing Appearance Intact;Moist drainage 05/18/22 0800   Drainage Amount Small 05/18/22 0800   Drainage Characteristics/Odor Serosanguineous 05/18/22 0800   Appearance Red 05/18/22 0800   Tissue loss description Partial thickness 05/18/22 0800   Red (%),  Wound Tissue Color 100 % 05/18/22 0800   Periwound Area Dry 05/18/22 0800   Care Cleansed with:;Soap and water;Sterile normal saline 05/18/22 0800   Dressing Changed 05/18/22 0800   Periwound Care Skin barrier film applied 05/18/22 0800   Compression Two layer compression 05/18/22 0800   Off Loading Football dressing;Off loading shoe 05/18/22 0800   Dressing Change Due 05/20/22 05/18/22 0800           Plan:     No orders of the defined types were placed in this encounter.          Follow up in about 2 days (around 5/20/2022) for wound care visit.

## 2022-05-20 ENCOUNTER — HOSPITAL ENCOUNTER (OUTPATIENT)
Dept: WOUND CARE | Facility: HOSPITAL | Age: 52
Discharge: HOME | End: 2022-05-20
Attending: PODIATRIST
Payer: MEDICARE

## 2022-05-20 VITALS
SYSTOLIC BLOOD PRESSURE: 185 MMHG | DIASTOLIC BLOOD PRESSURE: 101 MMHG | TEMPERATURE: 98 F | RESPIRATION RATE: 185 BRPM | HEART RATE: 86 BPM

## 2022-05-20 DIAGNOSIS — E11.621 DIABETIC ULCER OF LEFT FOOT: ICD-10-CM

## 2022-05-20 DIAGNOSIS — E11.621 DIABETIC ULCER OF RIGHT FOOT ASSOCIATED WITH TYPE 2 DIABETES MELLITUS, WITH FAT LAYER EXPOSED: Primary | ICD-10-CM

## 2022-05-20 DIAGNOSIS — L97.529 DIABETIC ULCER OF LEFT FOOT: ICD-10-CM

## 2022-05-20 DIAGNOSIS — L97.512 DIABETIC ULCER OF RIGHT FOOT ASSOCIATED WITH TYPE 2 DIABETES MELLITUS, WITH FAT LAYER EXPOSED: Primary | ICD-10-CM

## 2022-05-20 PROCEDURE — 99213 PR OFFICE/OUTPT VISIT, EST, LEVL III, 20-29 MIN: ICD-10-PCS | Mod: ,,, | Performed by: PODIATRIST

## 2022-05-20 PROCEDURE — 99215 OFFICE O/P EST HI 40 MIN: CPT

## 2022-05-20 PROCEDURE — 99213 OFFICE O/P EST LOW 20 MIN: CPT | Mod: ,,, | Performed by: PODIATRIST

## 2022-05-20 NOTE — PROGRESS NOTES
[facility]                Progress Note    Subjective:       Patient ID: Indio Ryder Jr. is a 51 y.o. male.    Chief Complaint: Wound Care    Patient ambulated to exam room unaided with prescribed CAM boot on LLE. No c/o pain at present. Dressing's intact.  Has attempted to avoid excess ambulation and sun exposure.  Still recovering from gen surgery at his sisters home out of town      Review of Systems   Constitutional: Negative for activity change, appetite change, chills, fatigue and fever.   HENT: Negative for hearing loss.    Eyes: Negative for photophobia and visual disturbance.   Respiratory: Negative for cough, chest tightness, shortness of breath and wheezing.    Cardiovascular: Positive for leg swelling. Negative for chest pain and palpitations.   Gastrointestinal: Negative for constipation, diarrhea, nausea and vomiting.   Endocrine: Negative for cold intolerance and heat intolerance.   Genitourinary: Negative for flank pain.   Musculoskeletal: Positive for gait problem and myalgias. Negative for neck pain and neck stiffness.   Skin: Positive for wound. Negative for color change.   Neurological: Positive for numbness. Negative for weakness, light-headedness and headaches.        + paresthesia    Psychiatric/Behavioral: Negative for sleep disturbance.         Objective:        Physical Exam  Vitals reviewed.   Constitutional:       Appearance: He is well-developed.   Cardiovascular:      Comments: dorsalis pedis and posterior tibial pulses are palpable bilaterally. Capillary refill time is within normal limits. + pedal hair growth         Musculoskeletal:         General: No tenderness. Normal range of motion.      Comments: Normal angle, base, station of gait. All toes without clubbing, cyanosis, or signs of ischemia.  No pain to palpation bilateral lower extremities.  Range of motion, stability, muscle strength, and muscle tone normal bilateral feet  and legs.    Skin:     General: Skin is warm and dry.      Coloration: Skin is not ashen or cyanotic.      Findings: Wound (see below) present. No rash.      Nails: There is no clubbing.   Neurological:      Mental Status: He is alert and oriented to person, place, and time.      Sensory: No sensory deficit.      Comments: Diminished/loss of protective sensation all toes bilateral to 10 gram monofilament.     Psychiatric:         Behavior: Behavior normal.         Vitals:    05/20/22 1111   BP: (!) 185/101   Pulse: 86   Resp: (!) 185   Temp: 97.9 °F (36.6 °C)       Assessment:           ICD-10-CM ICD-9-CM   1. Diabetic ulcer of right foot associated with type 2 diabetes mellitus, with fat layer exposed  E11.621 250.80    L97.512 707.15   2. Diabetic ulcer of left foot  E11.621 250.80    L97.529 707.15            Wound 10/25/21 1500 Diabetic Ulcer Left plantar;distal Foot (Active)   10/25/21 1500    Pre-existing: Yes   Primary Wound Type: Diabetic ulc   Side: Left   Orientation: plantar;distal   Location: Foot   Wound Number:    Ankle-Brachial Index:    Pulses:    Removal Indication and Assessment:    Wound Outcome: Other   (Retired) Wound Type:    (Retired) Wound Length (cm):    (Retired) Wound Width (cm):    (Retired) Depth (cm):    Wound Description (Comments):    Removal Indications:    Wound Image   05/20/22 1100   Wound WDL WDL 05/20/22 1100   Dressing Appearance Intact;Dry 05/20/22 1100   Drainage Amount None 05/20/22 1100   Appearance Sutures intact 05/20/22 1100   Periwound Area Dry 05/20/22 1100   Wound Edges Rolled/closed 05/20/22 1100   Wound Length (cm) 0 cm 05/20/22 1100   Wound Width (cm) 0 cm 05/20/22 1100   Wound Depth (cm) 0 cm 05/20/22 1100   Wound Volume (cm^3) 0 cm^3 05/20/22 1100   Wound Surface Area (cm^2) 0 cm^2 05/20/22 1100   Tunneling (depth (cm)/location) 0 05/20/22 1100   Undermining (depth (cm)/location) 0 05/20/22 1100   Care Cleansed with:;Antimicrobial agent;Sterile normal saline  05/20/22 1100   Dressing Changed 05/20/22 1100            Wound 04/08/22 1137 Diabetic Ulcer Left plantar;medial Foot (Active)   04/08/22 1137    Pre-existing: Yes   Primary Wound Type: Diabetic ulc   Side: Left   Orientation: plantar;medial   Location: Foot   Wound Number:    Ankle-Brachial Index:    Pulses:    Removal Indication and Assessment:    Wound Outcome:    (Retired) Wound Type:    (Retired) Wound Length (cm):    (Retired) Wound Width (cm):    (Retired) Depth (cm):    Wound Description (Comments):    Removal Indications:    Wound Image   05/20/22 1100   Wound WDL ex 05/20/22 1100   Dressing Appearance Intact 05/20/22 1100   Drainage Amount Small 05/20/22 1100   Drainage Characteristics/Odor Serosanguineous 05/20/22 1100   Appearance Red 05/20/22 1100   Tissue loss description Partial thickness 05/20/22 1100   Black (%), Wound Tissue Color 0 % 05/20/22 1100   Red (%), Wound Tissue Color 100 % 05/20/22 1100   Yellow (%), Wound Tissue Color 0 % 05/20/22 1100   Periwound Area Dry 05/20/22 1100   Wound Edges Defined 05/20/22 1100   Wound Length (cm) 0.4 cm 05/20/22 1100   Wound Width (cm) 0.4 cm 05/20/22 1100   Wound Depth (cm) 0.3 cm 05/20/22 1100   Wound Volume (cm^3) 0.048 cm^3 05/20/22 1100   Wound Surface Area (cm^2) 0.16 cm^2 05/20/22 1100   Tunneling (depth (cm)/location) 0 05/20/22 1100   Undermining (depth (cm)/location) 0 05/20/22 1100   Care Cleansed with:;Antimicrobial agent;Sterile normal saline 05/20/22 1100   Dressing Changed 05/20/22 1100            Wound 05/06/22 1142 Diabetic Ulcer Right Foot (Active)   05/06/22 1142    Pre-existing: Yes   Primary Wound Type: Diabetic ulc   Side: Right   Orientation:    Location: Foot   Wound Number:    Ankle-Brachial Index:    Pulses:    Removal Indication and Assessment:    Wound Outcome:    (Retired) Wound Type:    (Retired) Wound Length (cm):    (Retired) Wound Width (cm):    (Retired) Depth (cm):    Wound Description (Comments):    Removal Indications:     Wound Image   05/20/22 1100   Wound WDL ex 05/20/22 1100   Dressing Appearance Intact;Dry 05/20/22 1100   Drainage Amount None 05/20/22 1100   Appearance Pink 05/20/22 1100   Tissue loss description Partial thickness 05/20/22 1100   Black (%), Wound Tissue Color 0 % 05/20/22 1100   Red (%), Wound Tissue Color 100 % 05/20/22 1100   Yellow (%), Wound Tissue Color 0 % 05/20/22 1100   Periwound Area Dry 05/20/22 1100   Wound Edges Callused 05/20/22 1100   Wound Length (cm) 0.2 cm 05/20/22 1100   Wound Width (cm) 0.2 cm 05/20/22 1100   Wound Depth (cm) 0.1 cm 05/20/22 1100   Wound Volume (cm^3) 0.004 cm^3 05/20/22 1100   Wound Surface Area (cm^2) 0.04 cm^2 05/20/22 1100   Tunneling (depth (cm)/location) 0 05/20/22 1100   Undermining (depth (cm)/location) 0 05/20/22 1100   Care Cleansed with:;Antimicrobial agent;Sterile normal saline 05/20/22 1100   Dressing Changed 05/20/22 1100           Plan:                  Advised to stay indoors where air conditioned and to allow feet to stay dry. Continue to avoid excess pressure to the wound    Improvement continues.  May require one more application of neox to get to closure.  Will assess on next encounter.       Orders Placed This Encounter   Procedures    Change dressing     Left foot: Cavilon/Benzoin to plantar foot and periwound.  Custom fit foam pad to plantar foot. Football (cast padding x3). Coflex calamine. CAM boot.     Right foot: Cavilon/Benzoin to periwound, Betadine, cover with tegafoam.        Follow up in about 2 weeks (around 6/3/2022) for two nurse visits next week then Dr Peterson in 2 weeks.

## 2022-05-25 ENCOUNTER — HOSPITAL ENCOUNTER (OUTPATIENT)
Dept: WOUND CARE | Facility: HOSPITAL | Age: 52
Discharge: HOME | End: 2022-05-25
Attending: FAMILY MEDICINE
Payer: MEDICARE

## 2022-05-25 VITALS
RESPIRATION RATE: 20 BRPM | SYSTOLIC BLOOD PRESSURE: 167 MMHG | DIASTOLIC BLOOD PRESSURE: 93 MMHG | HEART RATE: 77 BPM | TEMPERATURE: 98 F

## 2022-05-25 PROCEDURE — 29581 APPL MULTLAYER CMPRN SYS LEG: CPT | Mod: LT

## 2022-05-25 NOTE — PROGRESS NOTES
[facility]  [address] [place]  [address]  Nurse Visit    Subjective:       Patient seen in clinic today.  Dressing changed as ordered.      Assessment:          Wound 04/08/22 1137 Diabetic Ulcer Left plantar;medial Foot (Active)   04/08/22 1137    Pre-existing: Yes   Primary Wound Type: Diabetic ulc   Side: Left   Orientation: plantar;medial   Location: Foot   Wound Number:    Ankle-Brachial Index:    Pulses:    Removal Indication and Assessment:    Wound Outcome:    (Retired) Wound Type:    (Retired) Wound Length (cm):    (Retired) Wound Width (cm):    (Retired) Depth (cm):    Wound Description (Comments):    Removal Indications:    Wound WDL ex 05/25/22 1000   Dressing Appearance Intact;Moist drainage 05/25/22 1000   Drainage Amount Small 05/25/22 1000   Drainage Characteristics/Odor Serous 05/25/22 1000   Appearance Red 05/25/22 1000   Tissue loss description Partial thickness 05/25/22 1000   Black (%), Wound Tissue Color 0 % 05/25/22 1000   Red (%), Wound Tissue Color 100 % 05/25/22 1000   Yellow (%), Wound Tissue Color 0 % 05/25/22 1000   Periwound Area Dry;Intact 05/25/22 1000   Wound Edges Defined 05/25/22 1000   Care Cleansed with:;Soap and water;Sterile normal saline 05/25/22 1000   Dressing Changed 05/25/22 1000   Periwound Care Moisturizer applied;Skin barrier film applied 05/25/22 1000   Compression Two layer compression 05/25/22 1000   Off Loading Football dressing;Off loading shoe 05/25/22 1000   Dressing Change Due 05/27/22 05/25/22 1000           Plan:     No orders of the defined types were placed in this encounter.          Follow up in about 2 days (around 5/27/2022).

## 2022-05-27 ENCOUNTER — HOSPITAL ENCOUNTER (OUTPATIENT)
Dept: WOUND CARE | Facility: HOSPITAL | Age: 52
Discharge: HOME | End: 2022-05-27
Attending: INTERNAL MEDICINE
Payer: MEDICARE

## 2022-05-27 VITALS — SYSTOLIC BLOOD PRESSURE: 136 MMHG | HEART RATE: 95 BPM | TEMPERATURE: 97 F | DIASTOLIC BLOOD PRESSURE: 84 MMHG

## 2022-05-27 DIAGNOSIS — E11.621 DIABETIC ULCER OF LEFT FOOT: ICD-10-CM

## 2022-05-27 DIAGNOSIS — L97.512 DIABETIC ULCER OF RIGHT FOOT ASSOCIATED WITH TYPE 2 DIABETES MELLITUS, WITH FAT LAYER EXPOSED: Primary | ICD-10-CM

## 2022-05-27 DIAGNOSIS — L97.529 DIABETIC ULCER OF LEFT FOOT: ICD-10-CM

## 2022-05-27 DIAGNOSIS — E11.621 DIABETIC ULCER OF RIGHT FOOT ASSOCIATED WITH TYPE 2 DIABETES MELLITUS, WITH FAT LAYER EXPOSED: Primary | ICD-10-CM

## 2022-05-27 PROCEDURE — 29581 APPL MULTLAYER CMPRN SYS LEG: CPT

## 2022-05-27 NOTE — PROGRESS NOTES
[facility] [address] [place]  [address]  Nurse Visit    Subjective:       Patient seen in clinic today.  Dressing changed as ordered.      Assessment:          Wound 10/25/21 1500 Diabetic Ulcer Left plantar;distal Foot (Active)   10/25/21 1500    Pre-existing: Yes   Primary Wound Type: Diabetic ulc   Side: Left   Orientation: plantar;distal   Location: Foot   Wound Number:    Ankle-Brachial Index:    Pulses:    Removal Indication and Assessment:    Wound Outcome: Other   (Retired) Wound Type:    (Retired) Wound Length (cm):    (Retired) Wound Width (cm):    (Retired) Depth (cm):    Wound Description (Comments):    Removal Indications:    Wound WDL WDL 05/27/22 1100   Dressing Appearance Intact;Dry 05/27/22 1100   Drainage Amount None 05/27/22 1100   Appearance Closed/resurfaced 05/27/22 1100   Wound Length (cm) 0 cm 05/27/22 1100   Wound Width (cm) 0 cm 05/27/22 1100   Wound Depth (cm) 0 cm 05/27/22 1100   Wound Volume (cm^3) 0 cm^3 05/27/22 1100   Wound Surface Area (cm^2) 0 cm^2 05/27/22 1100   Tunneling (depth (cm)/location) 0 05/27/22 1100   Undermining (depth (cm)/location) 0 05/27/22 1100   Care Cleansed with:;Antimicrobial agent;Sterile normal saline 05/27/22 1100   Dressing Changed 05/27/22 1100   Periwound Care Skin barrier film applied 05/27/22 1100   Compression Two layer compression 05/27/22 1100   Off Loading Football dressing;Off loading shoe 05/27/22 1100   Dressing Change Due 06/03/22 05/27/22 1100            Wound 04/08/22 1137 Diabetic Ulcer Left plantar;medial Foot (Active)   04/08/22 1137    Pre-existing: Yes   Primary Wound Type: Diabetic ulc   Side: Left   Orientation: plantar;medial   Location: Foot   Wound Number:    Ankle-Brachial Index:    Pulses:    Removal Indication and Assessment:    Wound Outcome:    (Retired) Wound Type:    (Retired) Wound Length (cm):    (Retired) Wound Width (cm):    (Retired) Depth (cm):    Wound Description (Comments):     Removal Indications:    Wound WDL ex 05/27/22 1100   Dressing Appearance Intact;Moist drainage 05/27/22 1100   Drainage Amount Small 05/27/22 1100   Drainage Characteristics/Odor Serous;No odor 05/27/22 1100   Appearance Red;Moist 05/27/22 1100   Tissue loss description Partial thickness 05/27/22 1100   Red (%), Wound Tissue Color 100 % 05/27/22 1100   Periwound Area Dry;Intact 05/27/22 1100   Wound Edges Defined 05/27/22 1100   Care Cleansed with:;Antimicrobial agent;Sterile normal saline 05/27/22 1100   Dressing Changed 05/27/22 1100   Periwound Care Moisture barrier applied 05/27/22 1100   Compression Two layer compression 05/27/22 1100   Off Loading Football dressing;Off loading shoe 05/27/22 1100   Dressing Change Due 06/03/22 05/27/22 1100            Wound 05/06/22 1142 Diabetic Ulcer Right Foot (Active)   05/06/22 1142    Pre-existing: Yes   Primary Wound Type: Diabetic ulc   Side: Right   Orientation:    Location: Foot   Wound Number:    Ankle-Brachial Index:    Pulses:    Removal Indication and Assessment:    Wound Outcome:    (Retired) Wound Type:    (Retired) Wound Length (cm):    (Retired) Wound Width (cm):    (Retired) Depth (cm):    Wound Description (Comments):    Removal Indications:    Wound WDL ex 05/27/22 1100   Dressing Appearance Intact;Dry 05/27/22 1100   Drainage Amount None 05/27/22 1100   Appearance Pink 05/27/22 1100   Tissue loss description Partial thickness 05/27/22 1100   Red (%), Wound Tissue Color 100 % 05/27/22 1100   Periwound Area Dry 05/27/22 1100   Wound Edges Callused 05/27/22 1100   Care Cleansed with:;Antimicrobial agent;Sterile normal saline 05/27/22 1100   Dressing Changed 05/27/22 1100   Periwound Care Skin barrier film applied 05/27/22 1100   Dressing Change Due 06/03/22 05/27/22 1100           Plan:     No orders of the defined types were placed in this encounter.          Follow up in about 1 week (around 6/3/2022) for wound care.

## 2022-06-01 ENCOUNTER — PATIENT MESSAGE (OUTPATIENT)
Dept: ADMINISTRATIVE | Facility: HOSPITAL | Age: 52
End: 2022-06-01
Payer: MEDICARE

## 2022-06-03 ENCOUNTER — HOSPITAL ENCOUNTER (OUTPATIENT)
Dept: WOUND CARE | Facility: HOSPITAL | Age: 52
Discharge: HOME | End: 2022-06-03
Attending: PODIATRIST
Payer: MEDICARE

## 2022-06-03 VITALS
SYSTOLIC BLOOD PRESSURE: 175 MMHG | TEMPERATURE: 98 F | RESPIRATION RATE: 18 BRPM | HEART RATE: 83 BPM | DIASTOLIC BLOOD PRESSURE: 94 MMHG

## 2022-06-03 DIAGNOSIS — L97.422 DIABETIC ULCER OF LEFT MIDFOOT ASSOCIATED WITH TYPE 2 DIABETES MELLITUS, WITH FAT LAYER EXPOSED: ICD-10-CM

## 2022-06-03 DIAGNOSIS — L97.529 DIABETIC ULCER OF LEFT FOOT: Primary | ICD-10-CM

## 2022-06-03 DIAGNOSIS — E11.621 DIABETIC ULCER OF LEFT FOOT: Primary | ICD-10-CM

## 2022-06-03 DIAGNOSIS — E11.621 DIABETIC ULCER OF LEFT MIDFOOT ASSOCIATED WITH TYPE 2 DIABETES MELLITUS, WITH FAT LAYER EXPOSED: ICD-10-CM

## 2022-06-03 PROCEDURE — 11042 DBRDMT SUBQ TIS 1ST 20SQCM/<: CPT | Performed by: PODIATRIST

## 2022-06-03 PROCEDURE — 29581 APPL MULTLAYER CMPRN SYS LEG: CPT

## 2022-06-03 PROCEDURE — 99499 UNLISTED E&M SERVICE: CPT | Mod: ,,, | Performed by: PODIATRIST

## 2022-06-03 PROCEDURE — 11042 DBRDMT SUBQ TIS 1ST 20SQCM/<: CPT | Mod: ,,, | Performed by: PODIATRIST

## 2022-06-03 PROCEDURE — 11042 WOUND DEBRIDEMENT: ICD-10-PCS | Mod: ,,, | Performed by: PODIATRIST

## 2022-06-03 PROCEDURE — 99499 NO LOS: ICD-10-PCS | Mod: ,,, | Performed by: PODIATRIST

## 2022-06-03 NOTE — PROGRESS NOTES
[facility]                Progress Note    Subjective:       Patient ID: Indio Ryder Jr. is a 51 y.o. male.    Chief Complaint: Wound Care    Follow up wound care visit. Patient to exam room unaided, prescribed CAM boot on LLEt. No c/o pain at present. Dressing intact with a moderate amount of serosanguineous, non-malodor drainage.       Review of Systems   Constitutional: Negative for activity change, appetite change, chills, fatigue and fever.   HENT: Negative for hearing loss.    Eyes: Negative for photophobia and visual disturbance.   Respiratory: Negative for cough, chest tightness, shortness of breath and wheezing.    Cardiovascular: Positive for leg swelling. Negative for chest pain and palpitations.   Gastrointestinal: Negative for constipation, diarrhea, nausea and vomiting.   Endocrine: Negative for cold intolerance and heat intolerance.   Genitourinary: Negative for flank pain.   Musculoskeletal: Positive for gait problem and myalgias. Negative for neck pain and neck stiffness.   Skin: Positive for wound. Negative for color change.   Neurological: Positive for numbness. Negative for weakness, light-headedness and headaches.        + paresthesia    Psychiatric/Behavioral: Negative for sleep disturbance.         Objective:        Physical Exam  Vitals reviewed.   Constitutional:       Appearance: He is well-developed.   Cardiovascular:      Comments: dorsalis pedis and posterior tibial pulses are palpable bilaterally. Capillary refill time is within normal limits. + pedal hair growth         Musculoskeletal:         General: No tenderness. Normal range of motion.      Comments: Normal angle, base, station of gait. All toes without clubbing, cyanosis, or signs of ischemia.  No pain to palpation bilateral lower extremities.  Range of motion, stability, muscle strength, and muscle tone normal bilateral feet and legs.    Skin:     General: Skin is warm and  dry.      Coloration: Skin is not ashen or cyanotic.      Findings: Wound (see below) present. No rash.      Nails: There is no clubbing.   Neurological:      Mental Status: He is alert and oriented to person, place, and time.      Sensory: No sensory deficit.      Comments: Diminished/loss of protective sensation all toes bilateral to 10 gram monofilament.     Psychiatric:         Behavior: Behavior normal.           Vitals:    06/03/22 1046   BP: (!) 175/94   Pulse: 83   Resp: 18   Temp: 97.7 °F (36.5 °C)       Assessment:           ICD-10-CM ICD-9-CM   1. Diabetic ulcer of left foot  E11.621 250.80    L97.529 707.15   2. Diabetic ulcer of left midfoot associated with type 2 diabetes mellitus, with fat layer exposed  E11.621 250.80    L97.422 707.14            Wound 10/25/21 1500 Diabetic Ulcer Left plantar;distal Foot (Active)   10/25/21 1500    Pre-existing: Yes   Primary Wound Type: Diabetic ulc   Side: Left   Orientation: plantar;distal   Location: Foot   Wound Number:    Ankle-Brachial Index:    Pulses:    Removal Indication and Assessment:    Wound Outcome: Other   (Retired) Wound Type:    (Retired) Wound Length (cm):    (Retired) Wound Width (cm):    (Retired) Depth (cm):    Wound Description (Comments):    Removal Indications:    Wound Image   06/03/22 1000   Wound WDL WDL 06/03/22 1000   Dressing Appearance Intact 06/03/22 1000   Drainage Amount None 06/03/22 1000   Appearance Epithelialization 06/03/22 1000   Black (%), Wound Tissue Color 0 % 06/03/22 1000   Yellow (%), Wound Tissue Color 0 % 06/03/22 1000   Periwound Area Intact;Macerated;Moist;Pale white 06/03/22 1000   Wound Edges Approximated 06/03/22 1000   Wound Length (cm) 0 cm 06/03/22 1000   Wound Width (cm) 0 cm 06/03/22 1000   Wound Depth (cm) 0 cm 06/03/22 1000   Wound Volume (cm^3) 0 cm^3 06/03/22 1000   Wound Surface Area (cm^2) 0 cm^2 06/03/22 1000   Tunneling (depth (cm)/location) 0 06/03/22 1000   Undermining (depth (cm)/location) 0  06/03/22 1000   Care Cleansed with:;Antimicrobial agent;Sterile normal saline 06/03/22 1000   Dressing Changed 06/03/22 1000   Periwound Care Moisture barrier applied 06/03/22 1000   Compression Two layer compression 06/03/22 1000   Off Loading Football dressing;Off loading shoe 06/03/22 1000   Dressing Change Due 06/07/22 06/03/22 1000            Wound 04/08/22 1137 Diabetic Ulcer Left plantar;medial Foot (Active)   04/08/22 1137    Pre-existing: Yes   Primary Wound Type: Diabetic ulc   Side: Left   Orientation: plantar;medial   Location: Foot   Wound Number:    Ankle-Brachial Index:    Pulses:    Removal Indication and Assessment:    Wound Outcome:    (Retired) Wound Type:    (Retired) Wound Length (cm):    (Retired) Wound Width (cm):    (Retired) Depth (cm):    Wound Description (Comments):    Removal Indications:    Wound Image   06/03/22 1000   Wound WDL ex 06/03/22 1000   Dressing Appearance Moist drainage;Intact 06/03/22 1000   Drainage Amount Moderate 06/03/22 1000   Drainage Characteristics/Odor Serosanguineous 06/03/22 1000   Appearance Red 06/03/22 1000   Tissue loss description Partial thickness 06/03/22 1000   Black (%), Wound Tissue Color 0 % 06/03/22 1000   Red (%), Wound Tissue Color 100 % 06/03/22 1000   Yellow (%), Wound Tissue Color 0 % 06/03/22 1000   Periwound Area Macerated 06/03/22 1000   Wound Edges Callused 06/03/22 1000   Wound Length (cm) 0.3 cm 06/03/22 1000   Wound Width (cm) 0.3 cm 06/03/22 1000   Wound Depth (cm) 0.2 cm 06/03/22 1000   Wound Volume (cm^3) 0.018 cm^3 06/03/22 1000   Wound Surface Area (cm^2) 0.09 cm^2 06/03/22 1000   Tunneling (depth (cm)/location) 0 06/03/22 1000   Undermining (depth (cm)/location) 0 06/03/22 1000   Care Cleansed with:;Antimicrobial agent;Sterile normal saline 06/03/22 1000   Dressing Changed 06/03/22 1000   Periwound Care Moisture barrier applied;Skin barrier film applied 06/03/22 1000   Compression Two layer compression 06/03/22 1000   Off Loading  Football dressing;Off loading shoe 06/03/22 1000   Dressing Change Due 06/07/22 06/03/22 1000           Plan:            Wound Debridement    Date/Time: 6/3/2022 10:24 AM  Performed by: Marivel Peterson DPM  Authorized by: Marivel Peterson DPM       Wound Details:    Location:  Left foot    Location:  Left Midfoot    Type of Debridement:  Excisional       Length (cm):  0.3       Area (sq cm):  0.09       Width (cm):  0.3       Percent Debrided (%):  100       Depth (cm):  0.2       Total Area Debrided (sq cm):  0.09    Depth of debridement:  Subcutaneous tissue    Tissue debrided:  Dermis, Epidermis and Subcutaneous    Devitalized tissue debrided:  Necrotic/Eschar and Callus    Instruments:  Curette    Bleeding:  Minimal  Hemostasis Achieved: Yes    Method Used:  Pressure  Patient tolerance:  Patient tolerated the procedure well with no immediate complications      Left Distal Plantar Foot wound appears healed. Right Plantar Foot wound still appears healed, photo in Media. Left Medial Plantar Foot wound measuring smaller overall post-debridement, but a heavy amount of maceration noted.     Wound care done as per order, RTC on Tuesday for Nurse Visit and MD in 1 week.      Orders Placed This Encounter   Procedures    Change dressing     Clean with NS.   Left foot: Cavilon/Benzoin to plantar foot, gentian violet to periwound and all macerated areas. Custom fit foam pad (with slit cut to allow drainage) to plantar foot. Football (cast padding x3). Coflex calamine. CAM boot.     Change at Nurse Visit        Follow up in about 4 days (around 6/7/2022) for wound care.

## 2022-06-06 ENCOUNTER — OFFICE VISIT (OUTPATIENT)
Dept: INTERNAL MEDICINE | Facility: CLINIC | Age: 52
End: 2022-06-06
Payer: MEDICARE

## 2022-06-06 VITALS
OXYGEN SATURATION: 99 % | BODY MASS INDEX: 30.33 KG/M2 | WEIGHT: 228.81 LBS | DIASTOLIC BLOOD PRESSURE: 82 MMHG | SYSTOLIC BLOOD PRESSURE: 120 MMHG | HEART RATE: 83 BPM | HEIGHT: 73 IN

## 2022-06-06 DIAGNOSIS — R06.00 DYSPNEA, UNSPECIFIED TYPE: ICD-10-CM

## 2022-06-06 DIAGNOSIS — C18.9 COLON ADENOCARCINOMA: ICD-10-CM

## 2022-06-06 DIAGNOSIS — R19.7 DIARRHEA, UNSPECIFIED TYPE: Primary | ICD-10-CM

## 2022-06-06 DIAGNOSIS — I95.1 ORTHOSTATIC HYPOTENSION: ICD-10-CM

## 2022-06-06 DIAGNOSIS — Z85.038 HISTORY OF COLON CANCER IN ADULTHOOD: ICD-10-CM

## 2022-06-06 DIAGNOSIS — Z86.16 HISTORY OF COVID-19: ICD-10-CM

## 2022-06-06 PROBLEM — A49.8 PROTEUS INFECTION: Status: RESOLVED | Noted: 2021-04-29 | Resolved: 2022-06-06

## 2022-06-06 PROCEDURE — 99214 OFFICE O/P EST MOD 30 MIN: CPT | Mod: PBBFAC | Performed by: INTERNAL MEDICINE

## 2022-06-06 PROCEDURE — 99999 PR PBB SHADOW E&M-EST. PATIENT-LVL IV: ICD-10-PCS | Mod: PBBFAC,,, | Performed by: INTERNAL MEDICINE

## 2022-06-06 PROCEDURE — 99215 OFFICE O/P EST HI 40 MIN: CPT | Mod: S$PBB,,, | Performed by: INTERNAL MEDICINE

## 2022-06-06 PROCEDURE — 99215 PR OFFICE/OUTPT VISIT, EST, LEVL V, 40-54 MIN: ICD-10-PCS | Mod: S$PBB,,, | Performed by: INTERNAL MEDICINE

## 2022-06-06 PROCEDURE — 99999 PR PBB SHADOW E&M-EST. PATIENT-LVL IV: CPT | Mod: PBBFAC,,, | Performed by: INTERNAL MEDICINE

## 2022-06-06 RX ORDER — METFORMIN HYDROCHLORIDE 500 MG/1
TABLET, EXTENDED RELEASE ORAL
Qty: 360 TABLET | Refills: 1 | Status: SHIPPED | OUTPATIENT
Start: 2022-06-06 | End: 2023-01-17

## 2022-06-06 RX ORDER — DIPHENOXYLATE HYDROCHLORIDE AND ATROPINE SULFATE 2.5; .025 MG/1; MG/1
TABLET ORAL
Qty: 40 TABLET | Refills: 0 | Status: SHIPPED | OUTPATIENT
Start: 2022-06-06 | End: 2023-07-27

## 2022-06-06 NOTE — PROGRESS NOTES
Subjective:       Patient ID: Indio Ryder Jr. is a 51 y.o. male.    Chief Complaint: Follow-up (Hospital f/u )    HPI   S/P robotoic colectomy for focal residual adenoma adjacent to previous bx site.  No cancer on resection colon.     He feels well.     Poorly controlled DM with neuropathy and a series of foot ulcerations.   Last a1c 8.1   Ozempic just started in  Hospital.  Trulicity stopped, as it was thought it was contributing to diarrhea.  He has been having diarrhea for months.  Taking PB, imodium regularly. Feels better when he holds metformin and ozempic.    He has another foot ulcer.  Wearing a boot on L.    Recent cardiology note reviewed.  More sob since intubated for covid.    He is not taking asa, prescribed for PAOD..    H e has persistent SOB.  Nuclear stress test neg for ischemia 2/22.  ·      Echo:  The estimated ejection fraction is 50%.  · Grade I left ventricular diastolic dysfunction.    CT chest:   2 mm nodule left upper lobes     Unaware of wheezing.  No cough.  NOt exercising.  BMI 30.    C/o heartburn .  Doesn't try antacids.  occurs randomly. Aware or regurgitation..  Began months ago.    Htn with h/o symptomatic orthostasis.   He only takes lisinopril when bp is elevated.  Dose was recently incr to 20 mg.  Review of Systems   Constitutional: Negative for activity change and unexpected weight change.   HENT: Negative for postnasal drip, rhinorrhea and sinus pressure/congestion.    Respiratory: Negative for chest tightness and shortness of breath.    Cardiovascular: Negative for chest pain and leg swelling.   Gastrointestinal: Positive for diarrhea. Negative for abdominal pain, nausea and vomiting.   Genitourinary: Negative for difficulty urinating, dysuria, hematuria and urgency.   Integumentary:  Negative for rash.   Neurological: Negative for headaches.   Psychiatric/Behavioral: Negative for dysphoric mood and sleep disturbance. The patient is not nervous/anxious.           Objective:      Physical Exam  Constitutional:       Appearance: He is well-developed.   Eyes:      General: No scleral icterus.  Neck:      Thyroid: No thyromegaly.      Vascular: No JVD.   Cardiovascular:      Rate and Rhythm: Normal rate and regular rhythm.      Heart sounds: Normal heart sounds.   Pulmonary:      Effort: Pulmonary effort is normal. No respiratory distress.      Breath sounds: Normal breath sounds. No wheezing or rales.   Abdominal:      Palpations: Abdomen is soft. There is no mass.      Tenderness: There is no abdominal tenderness.   Musculoskeletal:      Comments: L foot in boot   Neurological:      Mental Status: He is alert and oriented to person, place, and time.   Psychiatric:         Behavior: Behavior normal.           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, with long-term current use of insulin    Relevant Medications    metFORMIN (GLUCOPHAGE-XR) 500 MG ER 24hr tablet    Colon adenocarcinoma      Other Visit Diagnoses     Diarrhea, unspecified type    -  Primary    Relevant Orders    Clostridium difficile EIA    WBC, Stool    Stool culture    Stool Exam-Ova,Cysts,Parasites    Calprotectin, Stool    Dyspnea, unspecified type        Relevant Orders    Complete PFT with bronchodilator    Ambulatory referral/consult to Pulmonology    History of colon cancer in adulthood        History of COVID-19        BMI 30.0-30.9,adult        Orthostatic hypotension              Plan:       Indio was seen today for follow-up.    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  Take Nexium daily.    At least 45 min spent with pt and review of med record.

## 2022-06-06 NOTE — PATIENT INSTRUCTIONS
Change Metformin to ER formulation :  1 -2 tabs twice a day.      Start with 1 tab twice a day, to see if that helps diarrhea.  If tolerated, increase to 2 tabs twice a day.    Take Nexium daily on empty stomach.  Let me know if gastric refl ux does note improve.

## 2022-06-07 ENCOUNTER — HOSPITAL ENCOUNTER (OUTPATIENT)
Dept: WOUND CARE | Facility: HOSPITAL | Age: 52
Discharge: HOME | End: 2022-06-07
Attending: FAMILY MEDICINE
Payer: MEDICARE

## 2022-06-07 VITALS
RESPIRATION RATE: 20 BRPM | SYSTOLIC BLOOD PRESSURE: 129 MMHG | DIASTOLIC BLOOD PRESSURE: 83 MMHG | TEMPERATURE: 98 F | HEART RATE: 107 BPM

## 2022-06-07 DIAGNOSIS — L97.529 DIABETIC ULCER OF LEFT FOOT: Primary | ICD-10-CM

## 2022-06-07 DIAGNOSIS — E11.621 DIABETIC ULCER OF LEFT FOOT: Primary | ICD-10-CM

## 2022-06-07 PROCEDURE — 29581 APPL MULTLAYER CMPRN SYS LEG: CPT

## 2022-06-07 NOTE — PATIENT INSTRUCTIONS
Lotions:  Eucerin  Eucerin for Diabetics  Aquaphor  A&D Ointment  Gold Bond for Diabetics  Sween  Rodrigo's Bees All Purpose Baby Ointment  Urea 40 with Aloe (amazon.com)    OTC Pain Creams:  Voltaren Gel  Biofreeze  Bengay  [place]  Two Old Goat  Lidocaine Gel  Absorbine Veterinary Liniment Gel Topical Analgesic Sore Muscle and Joint Pain Relief    Shoes:  Oasics GT 2000 or gel foundations  New Balance- stability type shoes (940 series)  Saucony Stabil C3  Falcon GTS, Beast, Transcend  Hoka One Bondi7    Brands:  Propet  Sofft- for women  Axxiom- for women  Champion&Bardales- for men  Hailey  Cropeng  Aerosoles  Naturalizers  SAS  Ecco  Born  José Antonio Wang  [facility]- dress shoes  Vionic  Birkenstocks  Fitflops  Navneet

## 2022-06-07 NOTE — PROGRESS NOTES
[facility]  [address] [place]  [address]  Nurse Visit    Subjective:       Patient seen in clinic today.  Dressing changed as ordered.      Assessment:          Wound 04/08/22 1137 Diabetic Ulcer Left plantar;medial Foot (Active)   04/08/22 1137    Pre-existing: Yes   Primary Wound Type: Diabetic ulc   Side: Left   Orientation: plantar;medial   Location: Foot   Wound Number:    Ankle-Brachial Index:    Pulses:    Removal Indication and Assessment:    Wound Outcome:    (Retired) Wound Type:    (Retired) Wound Length (cm):    (Retired) Wound Width (cm):    (Retired) Depth (cm):    Wound Description (Comments):    Removal Indications:    Wound WDL ex 06/07/22 1600   Dressing Appearance Dry;Moist drainage 06/07/22 1600   Drainage Amount Small 06/07/22 1600   Drainage Characteristics/Odor Serosanguineous 06/07/22 1600   Appearance Red 06/07/22 1600   Tissue loss description Partial thickness 06/07/22 1600   Black (%), Wound Tissue Color 0 % 06/07/22 1600   Red (%), Wound Tissue Color 100 % 06/07/22 1600   Yellow (%), Wound Tissue Color 0 % 06/07/22 1600   Periwound Area Macerated 06/07/22 1600   Wound Edges Callused 06/07/22 1600   Care Cleansed with:;Soap and water;Sterile normal saline 06/07/22 1600   Dressing Changed 06/07/22 1600   Periwound Care Moisture barrier applied;Skin barrier film applied 06/07/22 1600   Compression Two layer compression 06/07/22 1600   Off Loading Football dressing;Off loading shoe 06/07/22 1600   Dressing Change Due 06/10/22 06/07/22 1600           Plan:     No orders of the defined types were placed in this encounter.          Follow up in about 3 days (around 6/10/2022) for wound care visit.

## 2022-06-10 ENCOUNTER — HOSPITAL ENCOUNTER (OUTPATIENT)
Dept: WOUND CARE | Facility: HOSPITAL | Age: 52
Discharge: HOME | End: 2022-06-10
Attending: PODIATRIST
Payer: MEDICARE

## 2022-06-10 VITALS — SYSTOLIC BLOOD PRESSURE: 166 MMHG | HEART RATE: 84 BPM | DIASTOLIC BLOOD PRESSURE: 82 MMHG | TEMPERATURE: 97 F

## 2022-06-10 DIAGNOSIS — E11.621 DIABETIC ULCER OF LEFT FOOT: Primary | ICD-10-CM

## 2022-06-10 DIAGNOSIS — E11.621 DIABETIC ULCER OF LEFT MIDFOOT ASSOCIATED WITH TYPE 2 DIABETES MELLITUS, WITH FAT LAYER EXPOSED: ICD-10-CM

## 2022-06-10 DIAGNOSIS — L97.422 DIABETIC ULCER OF LEFT MIDFOOT ASSOCIATED WITH TYPE 2 DIABETES MELLITUS, WITH FAT LAYER EXPOSED: ICD-10-CM

## 2022-06-10 DIAGNOSIS — L97.529 DIABETIC ULCER OF LEFT FOOT: Primary | ICD-10-CM

## 2022-06-10 PROCEDURE — 29581 APPL MULTLAYER CMPRN SYS LEG: CPT

## 2022-06-10 PROCEDURE — 11042 WOUND DEBRIDEMENT: ICD-10-PCS | Mod: ,,, | Performed by: PODIATRIST

## 2022-06-10 PROCEDURE — 99499 NO LOS: ICD-10-PCS | Mod: ,,, | Performed by: PODIATRIST

## 2022-06-10 PROCEDURE — 11042 DBRDMT SUBQ TIS 1ST 20SQCM/<: CPT | Performed by: PODIATRIST

## 2022-06-10 PROCEDURE — 11042 DBRDMT SUBQ TIS 1ST 20SQCM/<: CPT | Mod: ,,, | Performed by: PODIATRIST

## 2022-06-10 PROCEDURE — 99499 UNLISTED E&M SERVICE: CPT | Mod: ,,, | Performed by: PODIATRIST

## 2022-06-10 NOTE — PROGRESS NOTES
"[facility]                Progress Note    Subjective:       Patient ID: Indio Ryder Jr. is a 51 y.o. male.    Chief Complaint: Wound Check    Follow up wound care visit. Patient ambulated to exam room unaided with prescribed CAM boot on LLE. No c/o pain at present. Dressing intact with a scant amount of dried serosanguineous drainage noted. Patient reports "not walking much" and staying in air conditioned rooms.      Review of Systems   Constitutional: Negative for activity change, appetite change, chills, fatigue and fever.   HENT: Negative for hearing loss.    Eyes: Negative for photophobia and visual disturbance.   Respiratory: Negative for cough, chest tightness, shortness of breath and wheezing.    Cardiovascular: Positive for leg swelling. Negative for chest pain and palpitations.   Gastrointestinal: Negative for constipation, diarrhea, nausea and vomiting.   Endocrine: Negative for cold intolerance and heat intolerance.   Genitourinary: Negative for flank pain.   Musculoskeletal: Positive for gait problem and myalgias. Negative for neck pain and neck stiffness.   Skin: Positive for wound. Negative for color change.   Neurological: Positive for numbness. Negative for weakness, light-headedness and headaches.        + paresthesia    Psychiatric/Behavioral: Negative for sleep disturbance.         Objective:        Physical Exam  Vitals reviewed.   Constitutional:       Appearance: He is well-developed.   Cardiovascular:      Comments: dorsalis pedis and posterior tibial pulses are palpable bilaterally. Capillary refill time is within normal limits. + pedal hair growth         Musculoskeletal:         General: No tenderness. Normal range of motion.      Comments: Normal angle, base, station of gait. All toes without clubbing, cyanosis, or signs of ischemia.  No pain to palpation bilateral lower extremities.  Range of motion, stability, muscle strength, and " muscle tone normal bilateral feet and legs.    Skin:     General: Skin is warm and dry.      Coloration: Skin is not ashen or cyanotic.      Findings: Wound (see below) present. No rash.      Nails: There is no clubbing.   Neurological:      Mental Status: He is alert and oriented to person, place, and time.      Sensory: No sensory deficit.      Comments: Diminished/loss of protective sensation all toes bilateral to 10 gram monofilament.     Psychiatric:         Behavior: Behavior normal.           Vitals:    06/10/22 1138   BP: (!) 166/82   Pulse: 84   Temp: 97.2 °F (36.2 °C)       Assessment:           ICD-10-CM ICD-9-CM   1. Diabetic ulcer of left foot  E11.621 250.80    L97.529 707.15   2. Diabetic ulcer of left midfoot associated with type 2 diabetes mellitus, with fat layer exposed  E11.621 250.80    L97.422 707.14            Wound 10/25/21 1500 Diabetic Ulcer Left plantar;distal Foot (Active)   10/25/21 1500    Pre-existing: Yes   Primary Wound Type: Diabetic ulc   Side: Left   Orientation: plantar;distal   Location: Foot   Wound Number:    Ankle-Brachial Index:    Pulses:    Removal Indication and Assessment:    Wound Outcome: Other   (Retired) Wound Type:    (Retired) Wound Length (cm):    (Retired) Wound Width (cm):    (Retired) Depth (cm):    Wound Description (Comments):    Removal Indications:    Wound Image   06/10/22 1100   Wound WDL WDL 06/10/22 1100   Dressing Appearance Intact;Dry 06/10/22 1100   Drainage Amount None 06/10/22 1100   Appearance Epithelialization 06/10/22 1100   Tissue loss description Not applicable 06/10/22 1100   Black (%), Wound Tissue Color 0 % 06/10/22 1100   Red (%), Wound Tissue Color 0 % 06/10/22 1100   Yellow (%), Wound Tissue Color 0 % 06/10/22 1100   Periwound Area Intact;Macerated;Pale white 06/10/22 1100   Wound Edges Approximated 06/10/22 1100   Wound Length (cm) 0 cm 06/10/22 1100   Wound Width (cm) 0 cm 06/10/22 1100   Wound Depth (cm) 0 cm 06/10/22 1100   Wound  Volume (cm^3) 0 cm^3 06/10/22 1100   Wound Surface Area (cm^2) 0 cm^2 06/10/22 1100   Tunneling (depth (cm)/location) 0 06/10/22 1100   Undermining (depth (cm)/location) 0 06/10/22 1100   Care Cleansed with:;Antimicrobial agent;Sterile normal saline 06/10/22 1100   Dressing Changed 06/10/22 1100   Periwound Care Moisture barrier applied 06/10/22 1100   Compression Two layer compression 06/10/22 1100   Off Loading Football dressing;Off loading shoe 06/10/22 1100   Dressing Change Due 06/17/22 06/10/22 1100            Wound 04/08/22 1137 Diabetic Ulcer Left plantar;medial Foot (Active)   04/08/22 1137    Pre-existing: Yes   Primary Wound Type: Diabetic ulc   Side: Left   Orientation: plantar;medial   Location: Foot   Wound Number:    Ankle-Brachial Index:    Pulses:    Removal Indication and Assessment:    Wound Outcome:    (Retired) Wound Type:    (Retired) Wound Length (cm):    (Retired) Wound Width (cm):    (Retired) Depth (cm):    Wound Description (Comments):    Removal Indications:    Wound Image   06/10/22 1100   Wound WDL ex 06/10/22 1100   Dressing Appearance Intact;Dried drainage 06/10/22 1100   Drainage Amount Scant 06/10/22 1100   Drainage Characteristics/Odor Serosanguineous;No odor 06/10/22 1100   Appearance Red;Moist 06/10/22 1100   Tissue loss description Partial thickness 06/10/22 1100   Red (%), Wound Tissue Color 100 % 06/10/22 1100   Periwound Area Macerated;Moist;Pale white 06/10/22 1100   Wound Edges Callused 06/10/22 1100   Wound Length (cm) 0.3 cm 06/10/22 1100   Wound Width (cm) 0.3 cm 06/10/22 1100   Wound Depth (cm) 0.2 cm 06/10/22 1100   Wound Volume (cm^3) 0.018 cm^3 06/10/22 1100   Wound Surface Area (cm^2) 0.09 cm^2 06/10/22 1100   Care Cleansed with:;Antimicrobial agent;Sterile normal saline 06/10/22 1100   Dressing Changed 06/10/22 1100   Periwound Care Moisture barrier applied 06/10/22 1100   Compression Two layer compression 06/10/22 1100   Off Loading Football dressing;Off loading  shoe 06/10/22 1100   Dressing Change Due 06/17/22 06/10/22 1100           Plan:            Left Distal Plantar Foot wound appears healed with very thin, pale white skin covering. Left Medial Plantar Foot wound measuring the same as previous with red, granulating wound bed.     Wound care done as per order, RTC in 1 week.    Wound Debridement    Date/Time: 6/10/2022 10:55 AM  Performed by: Marivel Peterson DPM  Authorized by: Marivel Peterson DPM       Wound Details:    Location:  Left foot    Location:  Left Plantar    Type of Debridement:  Excisional       Length (cm):  0.3       Area (sq cm):  0.09       Width (cm):  0.3       Percent Debrided (%):  100       Depth (cm):  0.2       Total Area Debrided (sq cm):  0.09    Depth of debridement:  Subcutaneous tissue    Tissue debrided:  Epidermis, Dermis and Subcutaneous    Devitalized tissue debrided:  Callus and Fibrin    Instruments:  Curette    Bleeding:  Minimal  Hemostasis Achieved: Yes    Method Used:  Pressure  Patient tolerance:  Patient tolerated the procedure well with no immediate complications        Orders Placed This Encounter   Procedures    Debridement     This order was created via procedure documentation     Standing Status:   Standing     Number of Occurrences:   1    Change dressing     Clean with NS.   Left foot: Iodine to wound bed, macerated areas and periwound (allow to dry) then Cavilon/Benzoin to plantar foot. Iodoflex to wound bed. Custom fit felt pad (with thin slit cut to allow drainage) to plantar foot. Football (cast padding x3). Coflex calamine. CAM boot.     Change at Nurse Visit        Follow up in about 4 days (around 6/14/2022) for wound care.

## 2022-06-13 ENCOUNTER — TELEPHONE (OUTPATIENT)
Dept: INTERNAL MEDICINE | Facility: CLINIC | Age: 52
End: 2022-06-13
Payer: MEDICARE

## 2022-06-13 NOTE — TELEPHONE ENCOUNTER
----- Message from Joan Townsend LPN sent at 6/13/2022 10:23 AM CDT -----  Regarding: Scheduling Assistance  Good Morning,    Dr. Thakur would like the pt to be seen by Gigi. Can you assist me with getting them scheduled?    Thanks,  REBECA Franco

## 2022-06-13 NOTE — TELEPHONE ENCOUNTER
Called and spoke to the pt. He informed me that the last episode of diarrhea was on Saturday. Would you like him to still see GI? I'm sending over a message to [facility] Staff to get him scheduled.

## 2022-06-13 NOTE — TELEPHONE ENCOUNTER
----- Message from Lorena Thakur MD sent at 6/12/2022  5:22 PM CDT -----  Pls call - stool studies negative - If diarrhea persists, will need to see GI  I requested appt with Gigi Clay, but not yet make-pls assist.

## 2022-06-14 ENCOUNTER — HOSPITAL ENCOUNTER (OUTPATIENT)
Dept: WOUND CARE | Facility: HOSPITAL | Age: 52
Discharge: HOME | End: 2022-06-14
Attending: FAMILY MEDICINE
Payer: MEDICARE

## 2022-06-14 VITALS
DIASTOLIC BLOOD PRESSURE: 95 MMHG | HEART RATE: 92 BPM | RESPIRATION RATE: 18 BRPM | SYSTOLIC BLOOD PRESSURE: 171 MMHG | TEMPERATURE: 98 F

## 2022-06-14 DIAGNOSIS — E11.621 DIABETIC ULCER OF LEFT FOOT: Primary | ICD-10-CM

## 2022-06-14 DIAGNOSIS — L97.529 DIABETIC ULCER OF LEFT FOOT: Primary | ICD-10-CM

## 2022-06-14 DIAGNOSIS — E11.621 DIABETIC ULCER OF LEFT MIDFOOT ASSOCIATED WITH TYPE 2 DIABETES MELLITUS, WITH FAT LAYER EXPOSED: ICD-10-CM

## 2022-06-14 DIAGNOSIS — L97.422 DIABETIC ULCER OF LEFT MIDFOOT ASSOCIATED WITH TYPE 2 DIABETES MELLITUS, WITH FAT LAYER EXPOSED: ICD-10-CM

## 2022-06-14 PROCEDURE — 29581 APPL MULTLAYER CMPRN SYS LEG: CPT

## 2022-06-14 NOTE — PROGRESS NOTES
[facility] [address] [place]  [address]  Nurse Visit    Subjective:       Patient seen in clinic today.  Dressing changed as ordered.      Assessment:          Wound 10/25/21 1500 Diabetic Ulcer Left plantar;distal Foot (Active)   10/25/21 1500    Pre-existing: Yes   Primary Wound Type: Diabetic ulc   Side: Left   Orientation: plantar;distal   Location: Foot   Wound Number:    Ankle-Brachial Index:    Pulses:    Removal Indication and Assessment:    Wound Outcome: Other   (Retired) Wound Type:    (Retired) Wound Length (cm):    (Retired) Wound Width (cm):    (Retired) Depth (cm):    Wound Description (Comments):    Removal Indications:    Wound WDL WDL 06/14/22 1100   Dressing Appearance Intact;Dry 06/14/22 1100   Drainage Amount None 06/14/22 1100   Appearance Epithelialization 06/14/22 1100   Tissue loss description Not applicable 06/14/22 1100   Periwound Area Intact 06/14/22 1100   Wound Edges Approximated 06/14/22 1100   Care Cleansed with:;Antimicrobial agent;Sterile normal saline 06/14/22 1100   Dressing Changed 06/14/22 1100   Periwound Care Moisture barrier applied 06/14/22 1100   Compression Two layer compression 06/14/22 1100   Off Loading Football dressing;Off loading shoe 06/14/22 1100   Dressing Change Due 06/17/22 06/14/22 1100            Wound 04/08/22 1137 Diabetic Ulcer Left plantar;medial Foot (Active)   04/08/22 1137    Pre-existing: Yes   Primary Wound Type: Diabetic ulc   Side: Left   Orientation: plantar;medial   Location: Foot   Wound Number:    Ankle-Brachial Index:    Pulses:    Removal Indication and Assessment:    Wound Outcome:    (Retired) Wound Type:    (Retired) Wound Length (cm):    (Retired) Wound Width (cm):    (Retired) Depth (cm):    Wound Description (Comments):    Removal Indications:    Wound WDL ex 06/14/22 1100   Dressing Appearance Intact;Clean 06/14/22 1100   Drainage Amount Small 06/14/22 1100   Drainage Characteristics/Odor  Serosanguineous 06/14/22 1100   Appearance Red 06/14/22 1100   Tissue loss description Partial thickness 06/14/22 1100   Black (%), Wound Tissue Color 0 % 06/14/22 1100   Red (%), Wound Tissue Color 100 % 06/14/22 1100   Yellow (%), Wound Tissue Color 0 % 06/14/22 1100   Periwound Area Macerated 06/14/22 1100   Wound Edges Callused 06/14/22 1100   Care Cleansed with:;Antimicrobial agent;Sterile normal saline 06/14/22 1100   Dressing Changed 06/14/22 1100   Periwound Care Moisture barrier applied 06/14/22 1100   Compression Two layer compression 06/14/22 1100   Off Loading Football dressing;Off loading shoe 06/14/22 1100   Dressing Change Due 06/17/22 06/14/22 1100           Plan:     No orders of the defined types were placed in this encounter.          Follow up in about 3 days (around 6/17/2022) for wound care.

## 2022-06-17 ENCOUNTER — HOSPITAL ENCOUNTER (OUTPATIENT)
Dept: WOUND CARE | Facility: HOSPITAL | Age: 52
Discharge: HOME | End: 2022-06-17
Attending: PODIATRIST
Payer: MEDICARE

## 2022-06-17 VITALS — TEMPERATURE: 98 F | SYSTOLIC BLOOD PRESSURE: 181 MMHG | HEART RATE: 87 BPM | DIASTOLIC BLOOD PRESSURE: 89 MMHG

## 2022-06-17 DIAGNOSIS — L97.422 DIABETIC ULCER OF LEFT MIDFOOT ASSOCIATED WITH TYPE 2 DIABETES MELLITUS, WITH FAT LAYER EXPOSED: ICD-10-CM

## 2022-06-17 DIAGNOSIS — E11.621 DIABETIC ULCER OF LEFT FOOT: Primary | ICD-10-CM

## 2022-06-17 DIAGNOSIS — E11.621 DIABETIC ULCER OF LEFT MIDFOOT ASSOCIATED WITH TYPE 2 DIABETES MELLITUS, WITH FAT LAYER EXPOSED: ICD-10-CM

## 2022-06-17 DIAGNOSIS — L97.529 DIABETIC ULCER OF LEFT FOOT: Primary | ICD-10-CM

## 2022-06-17 PROCEDURE — 99499 NO LOS: ICD-10-PCS | Mod: ,,, | Performed by: PODIATRIST

## 2022-06-17 PROCEDURE — 29581 APPL MULTLAYER CMPRN SYS LEG: CPT

## 2022-06-17 PROCEDURE — 99499 UNLISTED E&M SERVICE: CPT | Mod: ,,, | Performed by: PODIATRIST

## 2022-06-17 PROCEDURE — 29581 APPL MULTLAYER CMPRN SYS LEG: CPT | Performed by: PODIATRIST

## 2022-06-17 PROCEDURE — 11042 WOUND DEBRIDEMENT: ICD-10-PCS | Mod: ,,, | Performed by: PODIATRIST

## 2022-06-17 PROCEDURE — 11042 DBRDMT SUBQ TIS 1ST 20SQCM/<: CPT | Mod: ,,, | Performed by: PODIATRIST

## 2022-06-17 PROCEDURE — 11042 DBRDMT SUBQ TIS 1ST 20SQCM/<: CPT | Performed by: PODIATRIST

## 2022-06-17 NOTE — PROGRESS NOTES
[facility]                Progress Note    Subjective:       Patient ID: Indio Ryder Jr. is a 51 y.o. male.    Chief Complaint: No chief complaint on file.    Patient ambulated into wound clinic using CAM boot. 2 layer compression and football remained intact. Small amount of dried drainage to felt offloading foam. None to mextra backing felt. Patient reports following instructions to sit less on porch at home with feet down.       Review of Systems   Constitutional: Positive for activity change. Negative for appetite change, chills, fatigue and fever.   HENT: Negative for hearing loss.    Eyes: Negative for photophobia and visual disturbance.   Respiratory: Negative for cough, chest tightness, shortness of breath and wheezing.    Cardiovascular: Positive for leg swelling. Negative for chest pain and palpitations.   Gastrointestinal: Negative for constipation, diarrhea, nausea and vomiting.   Endocrine: Negative for cold intolerance and heat intolerance.   Genitourinary: Negative for flank pain.   Musculoskeletal: Positive for gait problem and myalgias. Negative for neck pain and neck stiffness.   Skin: Positive for wound. Negative for color change.   Neurological: Positive for numbness. Negative for weakness, light-headedness and headaches.        + paresthesia    Psychiatric/Behavioral: Negative for sleep disturbance.         Objective:        Physical Exam  Vitals reviewed.   Constitutional:       Appearance: He is well-developed.   Cardiovascular:      Comments: dorsalis pedis and posterior tibial pulses are palpable bilaterally. Capillary refill time is within normal limits. + pedal hair growth         Musculoskeletal:         General: No tenderness. Normal range of motion.      Comments: Normal angle, base, station of gait. All toes without clubbing, cyanosis, or signs of ischemia.  No pain to palpation bilateral lower extremities.  Range of motion,  stability, muscle strength, and muscle tone normal bilateral feet and legs.    Skin:     General: Skin is warm and dry.      Coloration: Skin is not ashen or cyanotic.      Findings: Wound (see below) present. No rash.      Nails: There is no clubbing.   Neurological:      Mental Status: He is alert and oriented to person, place, and time.      Sensory: No sensory deficit.      Comments: Diminished/loss of protective sensation all toes bilateral to 10 gram monofilament.     Psychiatric:         Behavior: Behavior normal.           Vitals:    06/17/22 0802   BP: (!) 181/89   Pulse: 87   Temp: 97.9 °F (36.6 °C)       Assessment:           ICD-10-CM ICD-9-CM   1. Diabetic ulcer of left foot  E11.621 250.80    L97.529 707.15   2. Diabetic ulcer of left midfoot associated with type 2 diabetes mellitus, with fat layer exposed  E11.621 250.80    L97.422 707.14            Wound 04/08/22 1137 Diabetic Ulcer Left plantar;medial Foot (Active)   04/08/22 1137    Pre-existing: Yes   Primary Wound Type: Diabetic ulc   Side: Left   Orientation: plantar;medial   Location: Foot   Wound Number:    Ankle-Brachial Index:    Pulses:    Removal Indication and Assessment:    Wound Outcome:    (Retired) Wound Type:    (Retired) Wound Length (cm):    (Retired) Wound Width (cm):    (Retired) Depth (cm):    Wound Description (Comments):    Removal Indications:    Wound Image    06/17/22 0808   Wound WDL WDL 06/17/22 0808   Dressing Appearance Dried drainage 06/17/22 0808   Drainage Amount Small 06/17/22 0808   Drainage Characteristics/Odor Sanguineous 06/17/22 0808   Appearance Red 06/17/22 0808   Tissue loss description Full thickness 06/17/22 0808   Red (%), Wound Tissue Color 100 % 06/17/22 0808   Periwound Area Dry 06/17/22 0808   Wound Edges Callused 06/17/22 0808   Wound Length (cm) 0.5 cm 06/17/22 0808   Wound Width (cm) 0.3 cm 06/17/22 0808   Wound Depth (cm) 0.4 cm 06/17/22 0808   Wound Volume (cm^3) 0.06 cm^3 06/17/22 0808   Wound  Surface Area (cm^2) 0.15 cm^2 06/17/22 0808   Undermining (depth (cm)/location) 0.5 (cm) 6-12 o'clock 06/17/22 0808   Care Cleansed with: 06/17/22 0808   Dressing Changed 06/17/22 0808   Periwound Care Dry periwound area maintained;Moisture barrier applied 06/17/22 0808   Compression Two layer compression 06/17/22 0808   Off Loading Football dressing 06/17/22 0808           Plan:            Wound bed with some undermining and callous formation periwound. Red wound bed without odor. Continue with offloading football/CAM boot and compression this week with nurse visit.     Wound Debridement    Date/Time: 6/17/2022 7:54 AM  Performed by: Marivel Peterson DPM  Authorized by: Marivel Peterson DPM     Consent Done?:  Yes (Written)  Local anesthesia used?: No      Wound Details:    Location:  Left foot    Location:  Left Midfoot    Type of Debridement:  Excisional       Length (cm):  0.5       Area (sq cm):  0.15       Width (cm):  0.3       Percent Debrided (%):  100       Depth (cm):  0.4       Total Area Debrided (sq cm):  0.15    Depth of debridement:  Subcutaneous tissue    Tissue debrided:  Dermis, Epidermis and Subcutaneous    Devitalized tissue debrided:  Biofilm, Callus and Fibrin    Instruments:  Curette    Bleeding:  Minimal  Hemostasis Achieved: Yes    Method Used:  Pressure  Patient tolerance:  Patient tolerated the procedure well with no immediate complications        Orders Placed This Encounter   Procedures    Change dressing     Clean with saline. Apply benzoin periwound. Iodoflex to wound bed. Cover with felt cut-to-fit with small opening over wound bed to allow for passage of drainage. Cover with tim foam long. Cast padding x 3. Calamine coflex to LLE. CAM boot to ambulate. Change during nurse visit.        Follow up in about 4 days (around 6/21/2022) for wound care.

## 2022-06-21 ENCOUNTER — HOSPITAL ENCOUNTER (OUTPATIENT)
Dept: WOUND CARE | Facility: HOSPITAL | Age: 52
Discharge: HOME | End: 2022-06-21
Attending: FAMILY MEDICINE
Payer: MEDICARE

## 2022-06-21 VITALS
RESPIRATION RATE: 20 BRPM | SYSTOLIC BLOOD PRESSURE: 143 MMHG | HEART RATE: 82 BPM | TEMPERATURE: 98 F | DIASTOLIC BLOOD PRESSURE: 82 MMHG

## 2022-06-21 DIAGNOSIS — E11.621 DIABETIC ULCER OF LEFT FOOT: Primary | ICD-10-CM

## 2022-06-21 DIAGNOSIS — L97.529 DIABETIC ULCER OF LEFT FOOT: Primary | ICD-10-CM

## 2022-06-21 PROCEDURE — 29581 APPL MULTLAYER CMPRN SYS LEG: CPT

## 2022-06-21 NOTE — PROGRESS NOTES
[facility] [address] [place]  [address]  Nurse Visit    Subjective:       Patient seen in clinic today.  Dressing changed as ordered.      Assessment:          Wound 04/08/22 1137 Diabetic Ulcer Left plantar;medial Foot (Active)   04/08/22 1137    Pre-existing: Yes   Primary Wound Type: Diabetic ulc   Side: Left   Orientation: plantar;medial   Location: Foot   Wound Number:    Ankle-Brachial Index:    Pulses:    Removal Indication and Assessment:    Wound Outcome:    (Retired) Wound Type:    (Retired) Wound Length (cm):    (Retired) Wound Width (cm):    (Retired) Depth (cm):    Wound Description (Comments):    Removal Indications:    Wound WDL ex 06/21/22 1300   Dressing Appearance Intact;Dried drainage 06/21/22 1300   Drainage Amount Scant 06/21/22 1300   Drainage Characteristics/Odor Serosanguineous 06/21/22 1300   Appearance Red 06/21/22 1300   Tissue loss description Partial thickness 06/21/22 1300   Black (%), Wound Tissue Color 0 % 06/21/22 1300   Red (%), Wound Tissue Color 100 % 06/21/22 1300   Yellow (%), Wound Tissue Color 0 % 06/21/22 1300   Periwound Area Dry;Intact 06/21/22 1300   Wound Edges Defined;Callused 06/21/22 1300   Care Cleansed with:;Soap and water;Sterile normal saline 06/21/22 1300   Dressing Changed 06/21/22 1300   Periwound Care Dry periwound area maintained 06/21/22 1300   Compression Two layer compression 06/21/22 1300   Off Loading Football dressing;Off loading shoe 06/21/22 1300           Plan:     No orders of the defined types were placed in this encounter.          Follow up in about 3 days (around 6/24/2022) for wound care visit.

## 2022-06-24 ENCOUNTER — HOSPITAL ENCOUNTER (OUTPATIENT)
Dept: WOUND CARE | Facility: HOSPITAL | Age: 52
Discharge: HOME | End: 2022-06-24
Attending: PODIATRIST
Payer: MEDICARE

## 2022-06-24 VITALS
RESPIRATION RATE: 16 BRPM | DIASTOLIC BLOOD PRESSURE: 92 MMHG | SYSTOLIC BLOOD PRESSURE: 172 MMHG | HEART RATE: 91 BPM | TEMPERATURE: 98 F

## 2022-06-24 DIAGNOSIS — E11.621 DIABETIC ULCER OF LEFT MIDFOOT ASSOCIATED WITH TYPE 2 DIABETES MELLITUS, WITH FAT LAYER EXPOSED: ICD-10-CM

## 2022-06-24 DIAGNOSIS — L97.422 DIABETIC ULCER OF LEFT MIDFOOT ASSOCIATED WITH TYPE 2 DIABETES MELLITUS, WITH FAT LAYER EXPOSED: ICD-10-CM

## 2022-06-24 DIAGNOSIS — L97.529 DIABETIC ULCER OF LEFT FOOT: Primary | ICD-10-CM

## 2022-06-24 DIAGNOSIS — E11.621 DIABETIC ULCER OF LEFT FOOT: Primary | ICD-10-CM

## 2022-06-24 PROCEDURE — 11042 DBRDMT SUBQ TIS 1ST 20SQCM/<: CPT | Mod: ,,, | Performed by: PODIATRIST

## 2022-06-24 PROCEDURE — 99499 NO LOS: ICD-10-PCS | Mod: ,,, | Performed by: PODIATRIST

## 2022-06-24 PROCEDURE — 99499 UNLISTED E&M SERVICE: CPT | Mod: ,,, | Performed by: PODIATRIST

## 2022-06-24 PROCEDURE — 29581 APPL MULTLAYER CMPRN SYS LEG: CPT

## 2022-06-24 PROCEDURE — 11042 WOUND DEBRIDEMENT: ICD-10-PCS | Mod: ,,, | Performed by: PODIATRIST

## 2022-06-24 PROCEDURE — 11042 DBRDMT SUBQ TIS 1ST 20SQCM/<: CPT

## 2022-06-24 NOTE — PROGRESS NOTES
[facility]                Progress Note    Subjective:       Patient ID: Indio Ryder Jr. is a 51 y.o. male.    Chief Complaint: Wound Check    F/u wound care visit. Patient ambulatory with cam eric on as ordered, no difficulty noted. Patient denies pain or discomfort at present. Wound dressing to LLE intact with scant drainage noted.       Review of Systems   Constitutional: Positive for activity change. Negative for appetite change, chills, fatigue and fever.   HENT: Negative for hearing loss.    Eyes: Negative for photophobia and visual disturbance.   Respiratory: Negative for cough, chest tightness, shortness of breath and wheezing.    Cardiovascular: Positive for leg swelling. Negative for chest pain and palpitations.   Gastrointestinal: Negative for constipation, diarrhea, nausea and vomiting.   Endocrine: Negative for cold intolerance and heat intolerance.   Genitourinary: Negative for flank pain.   Musculoskeletal: Positive for gait problem and myalgias. Negative for neck pain and neck stiffness.   Skin: Positive for wound. Negative for color change.   Neurological: Positive for numbness. Negative for weakness, light-headedness and headaches.        + paresthesia    Psychiatric/Behavioral: Negative for sleep disturbance.         Objective:        Physical Exam  Vitals reviewed.   Constitutional:       Appearance: He is well-developed.   Cardiovascular:      Comments: dorsalis pedis and posterior tibial pulses are palpable bilaterally. Capillary refill time is within normal limits. + pedal hair growth         Musculoskeletal:         General: No tenderness. Normal range of motion.      Comments: Normal angle, base, station of gait. All toes without clubbing, cyanosis, or signs of ischemia.  No pain to palpation bilateral lower extremities.  Range of motion, stability, muscle strength, and muscle tone normal bilateral feet and legs.    Skin:     General:  Skin is warm and dry.      Coloration: Skin is not ashen or cyanotic.      Findings: Wound (see below) present. No rash.      Nails: There is no clubbing.   Neurological:      Mental Status: He is alert and oriented to person, place, and time.      Sensory: No sensory deficit.      Comments: Diminished/loss of protective sensation all toes bilateral to 10 gram monofilament.     Psychiatric:         Behavior: Behavior normal.           Vitals:    06/24/22 1125   BP: (!) 172/92   Pulse: 91   Resp: 16   Temp: 97.9 °F (36.6 °C)       Assessment:           ICD-10-CM ICD-9-CM   1. Diabetic ulcer of left foot  E11.621 250.80    L97.529 707.15   2. Diabetic ulcer of left midfoot associated with type 2 diabetes mellitus, with fat layer exposed  E11.621 250.80    L97.422 707.14            Wound 04/08/22 1137 Diabetic Ulcer Left plantar;medial Foot (Active)   04/08/22 1137    Pre-existing: Yes   Primary Wound Type: Diabetic ulc   Side: Left   Orientation: plantar;medial   Location: Foot   Wound Number:    Ankle-Brachial Index:    Pulses:    Removal Indication and Assessment:    Wound Outcome:    (Retired) Wound Type:    (Retired) Wound Length (cm):    (Retired) Wound Width (cm):    (Retired) Depth (cm):    Wound Description (Comments):    Removal Indications:    Wound WDL ex 06/24/22 1100   Dressing Appearance Intact;Moist drainage 06/24/22 1100   Drainage Amount Scant 06/24/22 1100   Drainage Characteristics/Odor Serosanguineous 06/24/22 1100   Appearance Red 06/24/22 1100   Tissue loss description Partial thickness 06/24/22 1100   Black (%), Wound Tissue Color 0 % 06/24/22 1100   Red (%), Wound Tissue Color 100 % 06/24/22 1100   Yellow (%), Wound Tissue Color 0 % 06/24/22 1100   Periwound Area Dry;Intact 06/24/22 1100   Wound Edges Callused 06/24/22 1100   Wound Length (cm) 0.3 cm 06/24/22 1100   Wound Width (cm) 0.2 cm 06/24/22 1100   Wound Depth (cm) 0.3 cm 06/24/22 1100   Wound Volume (cm^3) 0.018 cm^3 06/24/22 1100    Wound Surface Area (cm^2) 0.06 cm^2 06/24/22 1100   Care Cleansed with:;Soap and water;Sterile normal saline 06/24/22 1100   Dressing Changed 06/24/22 1100   Periwound Care Dry periwound area maintained 06/24/22 1100   Compression Two layer compression 06/24/22 1100   Off Loading Football dressing;Off loading shoe 06/24/22 1100   Dressing Change Due 07/01/22 06/24/22 1100           Plan:              Wound to left plantar foot improving, measuring 0.2cm smaller length and 0.1cm smaller width and depth. Wound care done per order. RTC in one week.    Wound Debridement    Date/Time: 6/24/2022 11:00 AM  Performed by: Marivel Peterson DPM  Authorized by: Marivel Peterson DPM       Wound Details:    Location:  Left foot    Location:  Left Midfoot    Type of Debridement:  Excisional       Length (cm):  0.3       Area (sq cm):  0.06       Width (cm):  0.2       Percent Debrided (%):  100       Depth (cm):  0.3       Total Area Debrided (sq cm):  0.06    Depth of debridement:  Subcutaneous tissue    Tissue debrided:  Dermis, Epidermis and Subcutaneous    Devitalized tissue debrided:  Fibrin and Callus    Instruments:  Curette    Bleeding:  Minimal  Hemostasis Achieved: Yes    Method Used:  Pressure  Patient tolerance:  Patient tolerated the procedure well with no immediate complications      Orders Placed This Encounter   Procedures    Debridement     This order was created via procedure documentation     Standing Status:   Standing     Number of Occurrences:   1    Change dressing     Clean with saline. Apply benzoin periwound. Iodoflex to wound bed. Cover with felt cut-to-fit with small opening over wound bed to allow for passage of drainage. Cover with mextra short x1. Cast padding x 3. Calamine coflex to LLE. CAM boot to ambulate.        Follow up in about 1 week (around 7/1/2022) for wound care visit.

## 2022-06-29 NOTE — PROGRESS NOTES
CHIEF COMPLAINT: Type 2 Diabetes     HPI: Mr. Indio Ryder Jr. is a 51 y.o. male who was diagnosed with Type 2 DM > 20 years.  Has h/o foot ulcers, amputations.  Has boot (L), podiatry in 2019, 2020.  Hospitalized 7/12/19-8/2/19.   Hospitalized 3/2020.  +covid19 3/15/20 vent, extubated 3/26/20, osteomyelitis, DKA  Last seen by me in 8/2021 and is now being seen by me.  Having some GI issues after (R) colectomy 4/25/2022.   (L) foot- still healing, in boot -f/u q week    Stopped lisinopril r/t hypotension 90/60s-dealing with orthostatic hypotension  Recently increased by Dr. Stahl to 20 mg daily      Stressors: older sister, father, cousin passed away in the past year  Arrived alone    Retired, wakes up around 11a,    Diet varies: admits to eating pork chops, carbs, etc.    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  Needs >100 strips per month related to fluctuations with blood glucose reading, a1c trends, and activity level.  See above     Diabetes Management Status    Statin: Taking  ACE/ARB: Taking    Screening or Prevention Patient's value Goal Complete/Controlled?   HgA1C Testing and Control   Lab Results   Component Value Date    HGBA1C 8.1 (H) 09/15/2021      Annually/Less than 8% Yes   Lipid profile : 07/14/2021 Annually Yes   LDL control Lab Results   Component Value Date    LDLCALC 52.8 (L) 07/14/2021    Annually/Less than 100 mg/dl  No   Nephropathy screening Lab Results   Component Value Date    LABMICR 6.0 07/14/2021     Lab Results   Component Value Date    PROTEINUA 1+ (A) 09/03/2020     "Annually Yes   Blood pressure BP Readings from Last 1 Encounters:   06/30/22 124/80    Less than 140/90 Yes   Dilated retinal exam : 03/23/2021 Annually Yes   Foot exam   : 04/25/2022 Annually Yes     REVIEW OF SYSTEMS  General: no weakness, fatigue, +weight gain 6-10#   Eyes: no double or blurred vision, eye pain, or redness  Cardiovascular: no chest pain, palpitations, edema, or murmurs.   Respiratory: no cough or dyspnea.   GI: + heartburn, nausea, +diarrhea; fair appetite.   Skin: no rashes, dryness, itching, or reactions at insulin injection sites.  Neuro: +numbness, tingling, tremors, or vertigo. +amputation, boot to (L) 5th digit, (R) 5th, great toe   Endocrine: no polyuria, polydipsia, polyphagia, heat or cold intolerance.     Vital Signs  /80 (BP Location: Left arm, Patient Position: Sitting, BP Method: Medium (Manual))   Pulse 102   Ht 6' 1" (1.854 m)   Wt 104.6 kg (230 lb 9.6 oz)   SpO2 99%   BMI 30.42 kg/m²     Hemoglobin A1C   Date Value Ref Range Status   09/15/2021 8.1 (H) 4.0 - 5.6 % Final     Comment:     ADA Screening Guidelines:  5.7-6.4%  Consistent with prediabetes  >or=6.5%  Consistent with diabetes    High levels of fetal hemoglobin interfere with the HbA1C  assay. Heterozygous hemoglobin variants (HbS, HgC, etc)do  not significantly interfere with this assay.   However, presence of multiple variants may affect accuracy.     07/14/2021 10.1 (H) 4.0 - 5.6 % Final     Comment:     ADA Screening Guidelines:  5.7-6.4%  Consistent with prediabetes  >or=6.5%  Consistent with diabetes    High levels of fetal hemoglobin interfere with the HbA1C  assay. Heterozygous hemoglobin variants (HbS, HgC, etc)do  not significantly interfere with this assay.   However, presence of multiple variants may affect accuracy.     04/28/2021 >14.0 (H) 4.0 - 5.6 % Final     Comment:     ADA Screening Guidelines:  5.7-6.4%  Consistent with prediabetes  >or=6.5%  Consistent with diabetes    High levels of fetal " hemoglobin interfere with the HbA1C  assay. Heterozygous hemoglobin variants (HbS, HgC, etc)do  not significantly interfere with this assay.   However, presence of multiple variants may affect accuracy.          Chemistry        Component Value Date/Time     04/29/2022 0236    K 3.5 04/29/2022 0236     04/29/2022 0236    CO2 26 04/29/2022 0236    BUN 13 04/29/2022 0236    CREATININE 0.8 04/29/2022 0236     (H) 04/29/2022 0236        Component Value Date/Time    CALCIUM 9.3 04/29/2022 0236    ALKPHOS 191 (H) 04/11/2022 1046    AST 14 04/11/2022 1046    ALT 16 04/11/2022 1046    BILITOT 0.7 04/11/2022 1046    ESTGFRAFRICA >60.0 04/29/2022 0236    EGFRNONAA >60.0 04/29/2022 0236           Lab Results   Component Value Date    TSH 1.136 03/01/2012      Lab Results   Component Value Date    CHOL 100 (L) 07/14/2021    CHOL 190 12/03/2019    CHOL 201 (H) 08/26/2019     Lab Results   Component Value Date    HDL 35 (L) 07/14/2021    HDL 56 12/03/2019    HDL 50 08/26/2019     Lab Results   Component Value Date    LDLCALC 52.8 (L) 07/14/2021    LDLCALC 120.2 12/03/2019    LDLCALC 132.8 08/26/2019     Lab Results   Component Value Date    TRIG 61 07/14/2021    TRIG 167 (H) 03/30/2020    TRIG 164 (H) 03/29/2020     Lab Results   Component Value Date    CHOLHDL 35.0 07/14/2021    CHOLHDL 29.5 12/03/2019    CHOLHDL 24.9 08/26/2019       PHYSICAL EXAMINATION  Constitutional: Appears well, no distress  Neck: Supple, trachea midline.   Respiratory: No wheezes, even and unlabored.  Cardiovascular: RRR;  no edema.   Lymph: deferred   Skin: warm and dry; no injection site reactions, no acanthosis nigracans observed.  Neuro:patient alert and cooperative, normal affect; steady gait.    Diabetes Foot Exam:    (L) boot     Assessment/Plan    1. Type 2 diabetes mellitus with hyperglycemia, with long-term current use of insulin  Hemoglobin A1C    Hemoglobin A1C    Microalbumin/Creatinine Ratio, Urine    Ambulatory  referral/consult to Optometry    Hemoglobin A1C   2. Colon adenocarcinoma  Ambulatory referral/consult to Gastroenterology   3. Essential hypertension     4. Diabetic ulcer of left heel associated with type 2 diabetes mellitus, with fat layer exposed     5. Heart burn  Ambulatory referral/consult to Gastroenterology     1. F/u in 6 months  a1c today or this week  a1c in 3 months  a1c in 6 months  a1c goal less than 7.5%  Continue ozempic 0.25 mg weekly   Gi referral  Take metformin xr as tolerated  Scale use of novolog 180-230+2, 231-280+4, etc.  levemir 26 units at night  Discussed dietary habits, stressors  2. F/u w/ GI  3. Continue med(s)  Controlled  4. F/u with podiatry  5. See above       FOLLOW UP  In 6 months

## 2022-06-30 ENCOUNTER — OFFICE VISIT (OUTPATIENT)
Dept: INTERNAL MEDICINE | Facility: CLINIC | Age: 52
End: 2022-06-30
Payer: MEDICARE

## 2022-06-30 VITALS
DIASTOLIC BLOOD PRESSURE: 80 MMHG | WEIGHT: 230.63 LBS | BODY MASS INDEX: 30.57 KG/M2 | OXYGEN SATURATION: 99 % | HEIGHT: 73 IN | HEART RATE: 102 BPM | SYSTOLIC BLOOD PRESSURE: 124 MMHG

## 2022-06-30 DIAGNOSIS — L97.422 DIABETIC ULCER OF LEFT HEEL ASSOCIATED WITH TYPE 2 DIABETES MELLITUS, WITH FAT LAYER EXPOSED: ICD-10-CM

## 2022-06-30 DIAGNOSIS — E11.621 DIABETIC ULCER OF LEFT HEEL ASSOCIATED WITH TYPE 2 DIABETES MELLITUS, WITH FAT LAYER EXPOSED: ICD-10-CM

## 2022-06-30 DIAGNOSIS — I10 ESSENTIAL HYPERTENSION: ICD-10-CM

## 2022-06-30 DIAGNOSIS — E11.65 TYPE 2 DIABETES MELLITUS WITH HYPERGLYCEMIA, WITH LONG-TERM CURRENT USE OF INSULIN: Primary | ICD-10-CM

## 2022-06-30 DIAGNOSIS — C18.9 COLON ADENOCARCINOMA: ICD-10-CM

## 2022-06-30 DIAGNOSIS — Z79.4 TYPE 2 DIABETES MELLITUS WITH HYPERGLYCEMIA, WITH LONG-TERM CURRENT USE OF INSULIN: Primary | ICD-10-CM

## 2022-06-30 DIAGNOSIS — R12 HEART BURN: ICD-10-CM

## 2022-06-30 PROCEDURE — 99214 OFFICE O/P EST MOD 30 MIN: CPT | Mod: S$PBB,,, | Performed by: NURSE PRACTITIONER

## 2022-06-30 PROCEDURE — 99999 PR PBB SHADOW E&M-EST. PATIENT-LVL V: CPT | Mod: PBBFAC,,, | Performed by: NURSE PRACTITIONER

## 2022-06-30 PROCEDURE — 99214 PR OFFICE/OUTPT VISIT, EST, LEVL IV, 30-39 MIN: ICD-10-PCS | Mod: S$PBB,,, | Performed by: NURSE PRACTITIONER

## 2022-06-30 PROCEDURE — 99999 PR PBB SHADOW E&M-EST. PATIENT-LVL V: ICD-10-PCS | Mod: PBBFAC,,, | Performed by: NURSE PRACTITIONER

## 2022-06-30 PROCEDURE — 99215 OFFICE O/P EST HI 40 MIN: CPT | Mod: PBBFAC | Performed by: NURSE PRACTITIONER

## 2022-06-30 NOTE — PATIENT INSTRUCTIONS
A1c today or this week  A1c in 3 months  A1c in 6 months   Follow up in 6 months w/Gigi  GI referral---heartburn, gas    Lab Results   Component Value Date    HGBA1C 8.1 (H) 09/15/2021     Goal less than 7%    Goal  no higher than 180     [email or web address]  Eat fit fili  [employer] fili  [email or web address]     Levemir 26 units at night   Novolog correction scale 180-230+2, 231-280+4, etc  Metformin xr 1000 mg 1x or 2 x a day  Ozempic 0.25 mg weekly

## 2022-06-30 NOTE — Clinical Note
Gi definitely needed- just placed order  Holding all antihyperglycemic agents at times I'll place him in my reminder to reach out in the next few weeks

## 2022-07-01 ENCOUNTER — HOSPITAL ENCOUNTER (OUTPATIENT)
Dept: WOUND CARE | Facility: HOSPITAL | Age: 52
Discharge: HOME | End: 2022-07-01
Attending: PODIATRIST
Payer: MEDICARE

## 2022-07-01 ENCOUNTER — TELEPHONE (OUTPATIENT)
Dept: INTERNAL MEDICINE | Facility: CLINIC | Age: 52
End: 2022-07-01
Payer: MEDICARE

## 2022-07-01 ENCOUNTER — OFFICE VISIT (OUTPATIENT)
Dept: GASTROENTEROLOGY | Facility: CLINIC | Age: 52
End: 2022-07-01
Payer: MEDICARE

## 2022-07-01 VITALS
RESPIRATION RATE: 20 BRPM | SYSTOLIC BLOOD PRESSURE: 168 MMHG | HEART RATE: 81 BPM | TEMPERATURE: 98 F | DIASTOLIC BLOOD PRESSURE: 96 MMHG

## 2022-07-01 VITALS
WEIGHT: 233.25 LBS | DIASTOLIC BLOOD PRESSURE: 94 MMHG | HEIGHT: 73 IN | HEART RATE: 90 BPM | SYSTOLIC BLOOD PRESSURE: 161 MMHG | BODY MASS INDEX: 30.91 KG/M2

## 2022-07-01 DIAGNOSIS — R19.7 DIARRHEA, UNSPECIFIED TYPE: Primary | ICD-10-CM

## 2022-07-01 DIAGNOSIS — R12 HEART BURN: ICD-10-CM

## 2022-07-01 DIAGNOSIS — E11.621 DIABETIC ULCER OF LEFT FOOT: Primary | ICD-10-CM

## 2022-07-01 DIAGNOSIS — C18.9 COLON ADENOCARCINOMA: ICD-10-CM

## 2022-07-01 DIAGNOSIS — L97.529 DIABETIC ULCER OF LEFT FOOT: Primary | ICD-10-CM

## 2022-07-01 PROCEDURE — 29581 APPL MULTLAYER CMPRN SYS LEG: CPT

## 2022-07-01 PROCEDURE — 99213 OFFICE O/P EST LOW 20 MIN: CPT | Mod: ,,, | Performed by: PODIATRIST

## 2022-07-01 PROCEDURE — 99204 OFFICE O/P NEW MOD 45 MIN: CPT | Mod: S$PBB,,, | Performed by: STUDENT IN AN ORGANIZED HEALTH CARE EDUCATION/TRAINING PROGRAM

## 2022-07-01 PROCEDURE — 99213 PR OFFICE/OUTPT VISIT, EST, LEVL III, 20-29 MIN: ICD-10-PCS | Mod: ,,, | Performed by: PODIATRIST

## 2022-07-01 PROCEDURE — 99999 PR PBB SHADOW E&M-EST. PATIENT-LVL III: ICD-10-PCS | Mod: PBBFAC,,, | Performed by: STUDENT IN AN ORGANIZED HEALTH CARE EDUCATION/TRAINING PROGRAM

## 2022-07-01 PROCEDURE — 99999 PR PBB SHADOW E&M-EST. PATIENT-LVL III: CPT | Mod: PBBFAC,,, | Performed by: STUDENT IN AN ORGANIZED HEALTH CARE EDUCATION/TRAINING PROGRAM

## 2022-07-01 PROCEDURE — 99213 OFFICE O/P EST LOW 20 MIN: CPT | Mod: PBBFAC,25 | Performed by: STUDENT IN AN ORGANIZED HEALTH CARE EDUCATION/TRAINING PROGRAM

## 2022-07-01 PROCEDURE — 99204 PR OFFICE/OUTPT VISIT, NEW, LEVL IV, 45-59 MIN: ICD-10-PCS | Mod: S$PBB,,, | Performed by: STUDENT IN AN ORGANIZED HEALTH CARE EDUCATION/TRAINING PROGRAM

## 2022-07-01 RX ORDER — PANTOPRAZOLE SODIUM 40 MG/1
40 TABLET, DELAYED RELEASE ORAL DAILY
Qty: 30 TABLET | Refills: 11 | Status: SHIPPED | OUTPATIENT
Start: 2022-07-01 | End: 2023-07-27

## 2022-07-01 NOTE — PROGRESS NOTES
[facility]                Progress Note    Subjective:       Patient ID: Indio Ryder Jr. is a 52 y.o. male.    Chief Complaint: Wound Check    F/u wound care visit. Patient ambulatory to exam room with darco on left foot as ordered. Patient denies pain or discomfort at present. Wound dressing to LLE intact with small amount of drainage noted.       Review of Systems   Constitutional: Positive for activity change. Negative for appetite change, chills, fatigue and fever.   HENT: Negative for hearing loss.    Eyes: Negative for photophobia and visual disturbance.   Respiratory: Negative for cough, chest tightness, shortness of breath and wheezing.    Cardiovascular: Positive for leg swelling. Negative for chest pain and palpitations.   Gastrointestinal: Negative for constipation, diarrhea, nausea and vomiting.   Endocrine: Negative for cold intolerance and heat intolerance.   Genitourinary: Negative for flank pain.   Musculoskeletal: Positive for gait problem and myalgias. Negative for neck pain and neck stiffness.   Skin: Positive for wound. Negative for color change.   Neurological: Positive for numbness. Negative for weakness, light-headedness and headaches.        + paresthesia    Psychiatric/Behavioral: Negative for sleep disturbance.         Objective:        Physical Exam  Vitals reviewed.   Constitutional:       Appearance: He is well-developed.   Cardiovascular:      Comments: dorsalis pedis and posterior tibial pulses are palpable bilaterally. Capillary refill time is within normal limits. + pedal hair growth         Musculoskeletal:         General: No tenderness. Normal range of motion.      Comments: Normal angle, base, station of gait. All toes without clubbing, cyanosis, or signs of ischemia.  No pain to palpation bilateral lower extremities.  Range of motion, stability, muscle strength, and muscle tone normal bilateral feet and legs.    Skin:      General: Skin is warm and dry.      Coloration: Skin is not ashen or cyanotic.      Findings: Wound (see below) present. No rash.      Nails: There is no clubbing.   Neurological:      Mental Status: He is alert and oriented to person, place, and time.      Sensory: No sensory deficit.      Comments: Diminished/loss of protective sensation all toes bilateral to 10 gram monofilament.     Psychiatric:         Behavior: Behavior normal.         Vitals:    07/01/22 0808   BP: (!) 168/96   Pulse: 81   Resp: 20   Temp: 97.7 °F (36.5 °C)       Assessment:           ICD-10-CM ICD-9-CM   1. Diabetic ulcer of left foot  E11.621 250.80    L97.529 707.15            Wound 04/08/22 1137 Diabetic Ulcer Left plantar;medial Foot (Active)   04/08/22 1137    Pre-existing: Yes   Primary Wound Type: Diabetic ulc   Side: Left   Orientation: plantar;medial   Location: Foot   Wound Number:    Ankle-Brachial Index:    Pulses:    Removal Indication and Assessment:    Wound Outcome:    (Retired) Wound Type:    (Retired) Wound Length (cm):    (Retired) Wound Width (cm):    (Retired) Depth (cm):    Wound Description (Comments):    Removal Indications:    Wound Image   07/01/22 0700   Wound WDL ex 07/01/22 0700   Dressing Appearance Intact;Moist drainage 07/01/22 0700   Drainage Amount Small 07/01/22 0700   Drainage Characteristics/Odor Serosanguineous 07/01/22 0700   Appearance Red 07/01/22 0700   Tissue loss description Partial thickness 07/01/22 0700   Black (%), Wound Tissue Color 0 % 07/01/22 0700   Red (%), Wound Tissue Color 100 % 07/01/22 0700   Yellow (%), Wound Tissue Color 0 % 07/01/22 0700   Periwound Area Macerated 07/01/22 0700   Wound Edges Defined 07/01/22 0700   Wound Length (cm) 0.3 cm 07/01/22 0700   Wound Width (cm) 0.2 cm 07/01/22 0700   Wound Depth (cm) 0.2 cm 07/01/22 0700   Wound Volume (cm^3) 0.012 cm^3 07/01/22 0700   Wound Surface Area (cm^2) 0.06 cm^2 07/01/22 0700   Care Cleansed with:;Soap and water;Sterile normal  saline 07/01/22 0700   Dressing Changed 07/01/22 0700   Periwound Care Moisture barrier applied;Skin barrier film applied 07/01/22 0700   Compression Two layer compression 07/01/22 0700   Off Loading Football dressing;Off loading shoe 07/01/22 0700   Dressing Change Due 07/08/22 07/01/22 0700           Plan:            Wound improving, measuring smaller in all dimensions. Wound care done per order. RTC in one week.    Orders Placed This Encounter   Procedures    Change dressing     Clean with saline. Gentian violet to macerated area. Apply Cavalon/benzoin periwound. Endofor/Iodoflex to wound bed. Cover with felt pad cut-to-fit with small opening over wound bed to allow for passage of drainage. Cover with mextra short x1. Cast padding x 3. Calamine coflex to LLE. CAM boot to ambulate.        Follow up in about 1 week (around 7/8/2022) for wound care visit.

## 2022-07-01 NOTE — PROGRESS NOTES
[facility] Consultation Note    Reason for Consult:  The primary encounter diagnosis was Diarrhea, unspecified type. Diagnoses of Colon adenocarcinoma and Heart burn were also pertinent to this visit.    PCP:   Lorena Thakur   [address] [facility] [id number]    Referring MD:  Gigi Clay, Aprn, Fnp  [address] [place] [address]    HPI:  This is a 51 y.o. male here for evaluation of gastroesophageal reflux disease and diarrhea..    The patient is s/p R hemicolectomy in the setting of invasive adenocarcinoma in April 2022.  Since surgery, he is doing well without any significant complaints post-operatively.    Today, he informs me that he has significant heartburn.  He has GERD like symptoms daily both during the day and night.  He tried OTC nexium, but this did not help much.  He is no longer taking this medication.  Denies any trouble swallowing.  He cannot identify any alleviating factors.     Regarding his bowel movements, he notes having diarrhea when he takes trulicity.  He was transitioned to Ozempic, but continues to have diarrhea.  HE subsequently stopped this.  Diarrhea only occurs with his diabetes medications.      He is not taking an OTC fiber supplement.  He does use imodium for diarrhea which helps.    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), Colon adenocarcinoma (04/12/2022), Diabetic ketoacidosis without coma associated with type 2 diabetes mellitus (05/30/2017), Diabetic ulcer of right foot associated with type 2 diabetes mellitus  (06/03/2015), Disorder of kidney and ureter, Essential hypertension (06/06/2013), Group B streptococcal infection (01/13/2017), Mixed hyperlipidemia (08/12/2014), Septic arthritis of left foot (04/28/2021), Shoulder impingement (08/12/2014), Subacute osteomyelitis of right foot (01/12/2017), Traumatic amputation of fifth toe of right foot (07/02/2015), and Type 2 diabetes mellitus with diabetic neuropathy, with long-term current use of insulin (05/03/2016).    Surgical History:  has a past surgical history that includes Toe amputation (Right, 06/05/2015); Toe amputation (Right, 01/19/2017); Debridement of foot (Left, 7/14/2019); Debridement of foot (Left, 7/17/2019); Debridement of foot (Bilateral, 4/29/2021); Colonoscopy (Right, 1/19/2022); Colonoscopy (N/A, 3/21/2022); xi robotic colectomy, right (N/A, 4/25/2022); and xi robotic colectomy, right (4/25/2022).    Family History: family history includes Cancer in his mother; Cataracts in his father; Diabetes in his mother and sister; Heart disease in his father and sister; Hypertension in his father, maternal aunt, and mother; No Known Problems in his brother, maternal grandfather, maternal grandmother, maternal uncle, paternal aunt, paternal grandfather, paternal grandmother, paternal uncle, and sister. He was adopted..     Social History:  reports that he has never smoked. He has never used smokeless tobacco. He reports that he does not drink alcohol and does not use drugs.    Review of patient's allergies indicates:  No Known Allergies    Current Outpatient Rx   Medication Sig Dispense Refill    acetaminophen (TYLENOL) 325 MG tablet Take 2 tablets (650 mg total) by mouth every 6 (six) hours as needed. 30 tablet 0    diphenoxylate-atropine 2.5-0.025 mg (LOMOTIL) 2.5-0.025 mg per tablet Take 1 to 2 tablets by mouth three times daily as needed for diarrhea 40 tablet 0    insulin aspart U-100 (NOVOLOG) 100 unit/mL (3 mL) InPn pen Inject 8 units before meals plus  "scale 150-200+2, 201-250+4, 251-300+6, 301-350+8, >350+10. Max daily 54 units. 15 mL 6    insulin detemir U-100 (LEVEMIR FLEXTOUCH) 100 unit/mL (3 mL) SubQ InPn pen Inject 26 Units into the skin every evening. 15 mL 6    metFORMIN (GLUCOPHAGE-XR) 500 MG ER 24hr tablet Take 1 to 2 tablets by mouth twice daily 360 tablet 1    ondansetron (ZOFRAN-ODT) 8 MG TbDL Dissolve 1 tablet (8 mg total) by mouth every 8 (eight) hours as needed (Nausea). 21 tablet 0    ONETOUCH DELICA LANCETS 33 gauge Misc       ONETOUCH ULTRA2 kit       pen needle, diabetic (BD SASCHA 2ND GEN PEN NEEDLE) 32 gauge x 5/32" Ndle Use 4 times a day (Patient taking differently: Use 4 times a day) 400 each 3    permethrin (ELIMITE) 5 % cream Apply neck down at night for 1 application. Wash off in morning and Repeat in 1 week 60 g 1    semaglutide (OZEMPIC) 0.25 mg or 0.5 mg(2 mg/1.5 mL) pen injector Inject 0.25 mg into the skin every 7 days for 30 days, THEN 0.5 mg every 7 days. 3 pen 0    pantoprazole (PROTONIX) 40 MG tablet Take 1 tablet (40 mg total) by mouth once daily. 30 tablet 11    tamsulosin (FLOMAX) 0.4 mg Cap Take 2 capsules (0.8 mg total) by mouth once daily. for 14 days 28 capsule 0       Objective Findings:    Vital Signs:  BP (!) 161/94   Pulse 90   Ht 6' 1" (1.854 m)   Wt 105.8 kg (233 lb 4 oz)   BMI 30.77 kg/m²   Body mass index is 30.77 kg/m².    Physical Exam:  General Appearance: Well appearing in no acute distress  Head:   Normocephalic, without obvious abnormality  Eyes:    No scleral icterus, EOMI  ENT: Neck supple, Lips, mucosa, and tongue normal; teeth and gums normal  Lungs: CTA bilaterally in anterior and posterior fields, no wheezes, no crackles.  Heart:  Regular rate and rhythm, S1, S2 normal, no murmurs heard  Abdomen: Soft, non tender, non distended with positive bowel sounds in all four quadrants. No hepatosplenomegaly, ascites, or mass  Extremities: 2+ pulses, no clubbing, cyanosis or edema  Skin: No " rash  Neurologic: CN II-XII intact      Labs:  Lab Results   Component Value Date    WBC 5.51 04/29/2022    HGB 9.9 (L) 04/29/2022    HCT 31.5 (L) 04/29/2022     04/29/2022    CHOL 100 (L) 07/14/2021    TRIG 61 07/14/2021    HDL 35 (L) 07/14/2021    ALT 16 04/11/2022    AST 14 04/11/2022     04/29/2022    K 3.5 04/29/2022     04/29/2022    CREATININE 0.8 04/29/2022    BUN 13 04/29/2022    CO2 26 04/29/2022    TSH 1.136 03/01/2012    PSA 0.65 08/11/2014    INR 1.1 01/16/2017    HGBA1C 8.1 (H) 09/15/2021         Assessment:  1. Diarrhea, unspecified type    2. Colon adenocarcinoma    3. Heart burn      In brief, the patient is a 51-year-old man with history of colon cancer status post resection in April of 2022 who presents with GERD and diarrhea.    The patient informs me that his symptoms of reflux and diarrhea predated his diagnosis management of colon cancer.  Regarding his acid reflux, symptoms seem fairly typical for GERD, but he is not on any acid suppressive therapy.  I have recommended he begin taking pantoprazole 40 mg once daily.  He is without any red flag or alarm symptoms at this time we will monitor his response to therapy.    His diarrhea seems directly related to his diabetic medications.  He has since stopped Trulicity and Ozempic, informing me that his diarrhea has resolved.  I recommended he begin over-the-counter fiber supplement once daily.  I offered a prescription for cholestyramine, but given that he is no longer having diarrhea we will hold off on this at present.  He is not clear if these DM medications will be resumed.    He will contact me if diarrhea recurs before his follow-up appt.  If so, we can consider stool studies and therapeutic management.    Follow up in about 3 months (around 10/1/2022).      Order summary:  Orders Placed This Encounter    pantoprazole (PROTONIX) 40 MG tablet         Thank you so much for allowing me to participate in the care of Indio ALVARADO  Britni Welch MD

## 2022-07-01 NOTE — TELEPHONE ENCOUNTER
----- Message from JESUS Bernardo, FNP sent at 7/1/2022 12:49 PM CDT -----  Regarding: call pt about meds  Hi!  Noted pt saw GI doctor on today  Hold metformin and ozempic     Start slowly with insulin tresiba 20 units at night   Goal to keep #s under 180 throughout day    Thanks  Giig

## 2022-07-08 ENCOUNTER — HOSPITAL ENCOUNTER (OUTPATIENT)
Dept: WOUND CARE | Facility: HOSPITAL | Age: 52
Discharge: HOME | End: 2022-07-08
Attending: PODIATRIST
Payer: MEDICARE

## 2022-07-08 VITALS — DIASTOLIC BLOOD PRESSURE: 88 MMHG | TEMPERATURE: 97 F | SYSTOLIC BLOOD PRESSURE: 172 MMHG | HEART RATE: 89 BPM

## 2022-07-08 DIAGNOSIS — E11.621 DIABETIC ULCER OF LEFT MIDFOOT ASSOCIATED WITH TYPE 2 DIABETES MELLITUS, WITH FAT LAYER EXPOSED: ICD-10-CM

## 2022-07-08 DIAGNOSIS — L97.529 DIABETIC ULCER OF LEFT FOOT: Primary | ICD-10-CM

## 2022-07-08 DIAGNOSIS — L97.422 DIABETIC ULCER OF LEFT MIDFOOT ASSOCIATED WITH TYPE 2 DIABETES MELLITUS, WITH FAT LAYER EXPOSED: ICD-10-CM

## 2022-07-08 DIAGNOSIS — I10 ESSENTIAL HYPERTENSION: ICD-10-CM

## 2022-07-08 DIAGNOSIS — E11.621 DIABETIC ULCER OF LEFT FOOT: Primary | ICD-10-CM

## 2022-07-08 PROCEDURE — 29581 APPL MULTLAYER CMPRN SYS LEG: CPT | Mod: 59

## 2022-07-08 PROCEDURE — 11042 DBRDMT SUBQ TIS 1ST 20SQCM/<: CPT | Performed by: PODIATRIST

## 2022-07-08 PROCEDURE — 11042 DBRDMT SUBQ TIS 1ST 20SQCM/<: CPT | Mod: ,,, | Performed by: PODIATRIST

## 2022-07-08 PROCEDURE — 99499 UNLISTED E&M SERVICE: CPT | Mod: ,,, | Performed by: PODIATRIST

## 2022-07-08 PROCEDURE — 11042 WOUND DEBRIDEMENT: ICD-10-PCS | Mod: ,,, | Performed by: PODIATRIST

## 2022-07-08 PROCEDURE — 99499 NO LOS: ICD-10-PCS | Mod: ,,, | Performed by: PODIATRIST

## 2022-07-08 RX ORDER — CIPROFLOXACIN 500 MG/1
500 TABLET ORAL 2 TIMES DAILY
Qty: 20 TABLET | Refills: 0 | Status: SHIPPED | OUTPATIENT
Start: 2022-07-08 | End: 2022-07-15 | Stop reason: SDUPTHER

## 2022-07-08 NOTE — PROGRESS NOTES
"[facility]                Progress Note    Subjective:       Patient ID: Indio Ryder Jr. is a 52 y.o. male.    Chief Complaint: Wound Check    Follow up wound care visit. Patient ambulated to exam room unaided with prescribed CAM boot on LLE. No c/o pain at present. Dressing intact with a small amount of serosanguineous drainage noted. Patient reports "walking around Sunday". Denies fever or chills at present.      Review of Systems   Constitutional: Positive for activity change. Negative for appetite change, chills, fatigue and fever.   HENT: Negative for hearing loss.    Eyes: Negative for photophobia and visual disturbance.   Respiratory: Negative for cough, chest tightness, shortness of breath and wheezing.    Cardiovascular: Positive for leg swelling. Negative for chest pain and palpitations.   Gastrointestinal: Negative for constipation, diarrhea, nausea and vomiting.   Endocrine: Negative for cold intolerance and heat intolerance.   Genitourinary: Negative for flank pain.   Musculoskeletal: Positive for gait problem and myalgias. Negative for neck pain and neck stiffness.   Skin: Positive for wound. Negative for color change.   Neurological: Positive for numbness. Negative for weakness, light-headedness and headaches.        + paresthesia    Psychiatric/Behavioral: Negative for sleep disturbance.         Objective:        Physical Exam  Vitals reviewed.   Constitutional:       Appearance: He is well-developed.   Cardiovascular:      Comments: dorsalis pedis and posterior tibial pulses are palpable bilaterally. Capillary refill time is within normal limits. + pedal hair growth         Musculoskeletal:         General: No tenderness. Normal range of motion.      Comments: Normal angle, base, station of gait. All toes without clubbing, cyanosis, or signs of ischemia.  No pain to palpation bilateral lower extremities.  Range of motion, stability, muscle " strength, and muscle tone normal bilateral feet and legs.    Skin:     General: Skin is warm and dry.      Coloration: Skin is not ashen or cyanotic.      Findings: Wound (see below) present. No rash.      Nails: There is no clubbing.   Neurological:      Mental Status: He is alert and oriented to person, place, and time.      Sensory: No sensory deficit.      Comments: Diminished/loss of protective sensation all toes bilateral to 10 gram monofilament.     Psychiatric:         Behavior: Behavior normal.           Vitals:    07/08/22 1000   BP: (!) 172/88   Pulse: 89   Temp: 97.3 °F (36.3 °C)       Assessment:           ICD-10-CM ICD-9-CM   1. Diabetic ulcer of left foot  E11.621 250.80    L97.529 707.15   2. Diabetic ulcer of left midfoot associated with type 2 diabetes mellitus, with fat layer exposed  E11.621 250.80    L97.422 707.14   3. Essential hypertension  I10 401.9            Wound 04/08/22 1137 Diabetic Ulcer Left plantar;medial Foot (Active)   04/08/22 1137    Pre-existing: Yes   Primary Wound Type: Diabetic ulc   Side: Left   Orientation: plantar;medial   Location: Foot   Wound Number:    Ankle-Brachial Index:    Pulses:    Removal Indication and Assessment:    Wound Outcome:    (Retired) Wound Type:    (Retired) Wound Length (cm):    (Retired) Wound Width (cm):    (Retired) Depth (cm):    Wound Description (Comments):    Removal Indications:    Wound Image    07/08/22 1000   Wound WDL ex 07/08/22 1000   Dressing Appearance Intact;Moist drainage 07/08/22 1000   Drainage Amount Small 07/08/22 1000   Drainage Characteristics/Odor Serosanguineous 07/08/22 1000   Appearance Red;Moist 07/08/22 1000   Tissue loss description Partial thickness 07/08/22 1000   Black (%), Wound Tissue Color 0 % 07/08/22 1000   Red (%), Wound Tissue Color 100 % 07/08/22 1000   Yellow (%), Wound Tissue Color 0 % 07/08/22 1000   Periwound Area Macerated;Pale white 07/08/22 1000   Wound Edges Defined 07/08/22 1000   Wound Length (cm)  0.5 cm 07/08/22 1000   Wound Width (cm) 0.6 cm 07/08/22 1000   Wound Depth (cm) 0.2 cm 07/08/22 1000   Wound Volume (cm^3) 0.06 cm^3 07/08/22 1000   Wound Surface Area (cm^2) 0.3 cm^2 07/08/22 1000   Care Cleansed with:;Antimicrobial agent;Sterile normal saline 07/08/22 1000   Dressing Changed 07/08/22 1000   Periwound Care Moisture barrier applied 07/08/22 1000   Compression Two layer compression 07/08/22 1000   Off Loading Football dressing;Off loading shoe 07/08/22 1000   Dressing Change Due 07/15/22 07/08/22 1000           Plan:              Left Plantar Foot wound measuring larger in length and width. Plan for Neox placement at next visit.     Systolic B/P elevated 170's, patient denies shortness of breath, chest pains or dizziness at present. He showed photo of B/P machine done this morning systolic B/P was 99.    Wound Debridement    Date/Time: 7/8/2022 9:43 AM  Performed by: Marivel Peterson DPM  Authorized by: Marivel Peterson DPM       Wound Details:    Location:  Left foot    Location:  Left Midfoot    Type of Debridement:  Excisional       Length (cm):  0.5       Area (sq cm):  0.3       Width (cm):  0.6       Percent Debrided (%):  100       Depth (cm):  0.2       Total Area Debrided (sq cm):  0.3    Depth of debridement:  Subcutaneous tissue    Tissue debrided:  Dermis, Epidermis and Subcutaneous    Devitalized tissue debrided:  Callus and Fibrin    Instruments:  Curette    Bleeding:  Minimal  Hemostasis Achieved: Yes    Method Used:  Pressure  Patient tolerance:  Patient tolerated the procedure well with no immediate complications        Orders Placed This Encounter   Procedures    Debridement     This order was created via procedure documentation     Standing Status:   Standing     Number of Occurrences:   1    Change dressing     Clean with saline. Gentian violet to macerated area. Apply Cavalon then Benzoin periwound. Endoform then Iodoflex to wound bed. Custom felt pad to Left Plantar foot  (cut-to-fit with small opening over wound bed to allow for passage of drainage). Cover with mextra short x1. Football dressing (Cast padding x 3). Calamine coflex to LLE. CAM boot to ambulate.        Follow up in about 1 week (around 7/15/2022) for wound care.

## 2022-07-15 ENCOUNTER — HOSPITAL ENCOUNTER (OUTPATIENT)
Dept: WOUND CARE | Facility: HOSPITAL | Age: 52
Discharge: HOME | End: 2022-07-15
Attending: PODIATRIST
Payer: MEDICARE

## 2022-07-15 VITALS — SYSTOLIC BLOOD PRESSURE: 155 MMHG | TEMPERATURE: 97 F | HEART RATE: 88 BPM | DIASTOLIC BLOOD PRESSURE: 86 MMHG

## 2022-07-15 DIAGNOSIS — E11.621 DIABETIC ULCER OF LEFT MIDFOOT ASSOCIATED WITH TYPE 2 DIABETES MELLITUS, WITH FAT LAYER EXPOSED: Primary | ICD-10-CM

## 2022-07-15 DIAGNOSIS — L97.422 DIABETIC ULCER OF LEFT MIDFOOT ASSOCIATED WITH TYPE 2 DIABETES MELLITUS, WITH FAT LAYER EXPOSED: Primary | ICD-10-CM

## 2022-07-15 PROCEDURE — 15275 PR SKIN SUB GRAFT FACE/NK/HF/G UP TO 100 SQCM: ICD-10-PCS | Mod: ,,, | Performed by: PODIATRIST

## 2022-07-15 PROCEDURE — 99499 UNLISTED E&M SERVICE: CPT | Mod: ,,, | Performed by: PODIATRIST

## 2022-07-15 PROCEDURE — 15271 SKIN SUB GRAFT TRNK/ARM/LEG: CPT | Performed by: PODIATRIST

## 2022-07-15 PROCEDURE — 99499 NO LOS: ICD-10-PCS | Mod: ,,, | Performed by: PODIATRIST

## 2022-07-15 PROCEDURE — 15275 SKIN SUB GRAFT FACE/NK/HF/G: CPT | Mod: ,,, | Performed by: PODIATRIST

## 2022-07-15 RX ORDER — CIPROFLOXACIN 500 MG/1
500 TABLET ORAL 2 TIMES DAILY
Qty: 20 TABLET | Refills: 0 | Status: SHIPPED | OUTPATIENT
Start: 2022-07-15 | End: 2022-07-25

## 2022-07-15 NOTE — PROGRESS NOTES
"[facility]                Progress Note    Subjective:       Patient ID: Indio Ryder Jr. is a 52 y.o. male.    Chief Complaint: Wound Check    Follow up wound care visit. Patient walked to exam room with no difficulty noted, prescribed CAM boot on LLE. Patient denies pain or discomfort at present. Dressing intact with a small amount of serosanguineous drainage noted. He reports "getting it (dressing) wet on Saturday" due to rain.       Review of Systems   Constitutional: Positive for activity change. Negative for appetite change, chills, fatigue and fever.   HENT: Negative for hearing loss.    Eyes: Negative for photophobia and visual disturbance.   Respiratory: Negative for cough, chest tightness, shortness of breath and wheezing.    Cardiovascular: Positive for leg swelling. Negative for chest pain and palpitations.   Gastrointestinal: Negative for constipation, diarrhea, nausea and vomiting.   Endocrine: Negative for cold intolerance and heat intolerance.   Genitourinary: Negative for flank pain.   Musculoskeletal: Positive for gait problem and myalgias. Negative for neck pain and neck stiffness.   Skin: Positive for wound. Negative for color change.   Neurological: Positive for numbness. Negative for weakness, light-headedness and headaches.        + paresthesia    Psychiatric/Behavioral: Negative for sleep disturbance.         Objective:        Physical Exam  Vitals reviewed.   Constitutional:       Appearance: He is well-developed.   Cardiovascular:      Comments: dorsalis pedis and posterior tibial pulses are palpable bilaterally. Capillary refill time is within normal limits. + pedal hair growth         Musculoskeletal:         General: No tenderness. Normal range of motion.      Comments: Normal angle, base, station of gait. All toes without clubbing, cyanosis, or signs of ischemia.  No pain to palpation bilateral lower extremities.  Range of motion, " stability, muscle strength, and muscle tone normal bilateral feet and legs.    Skin:     General: Skin is warm and dry.      Coloration: Skin is not ashen or cyanotic.      Findings: Wound (see below) present. No rash.      Nails: There is no clubbing.   Neurological:      Mental Status: He is alert and oriented to person, place, and time.      Sensory: No sensory deficit.      Comments: Diminished/loss of protective sensation all toes bilateral to 10 gram monofilament.     Psychiatric:         Behavior: Behavior normal.         Vitals:    07/15/22 1037   BP: (!) 155/86   Pulse: 88   Temp: 97.4 °F (36.3 °C)       Assessment:           ICD-10-CM ICD-9-CM   1. Diabetic ulcer of left midfoot associated with type 2 diabetes mellitus, with fat layer exposed  E11.621 250.80    L97.422 707.14            Wound 04/08/22 1137 Diabetic Ulcer Left plantar;medial Foot (Active)   04/08/22 1137    Pre-existing: Yes   Primary Wound Type: Diabetic ulc   Side: Left   Orientation: plantar;medial   Location: Foot   Wound Number:    Ankle-Brachial Index:    Pulses:    Removal Indication and Assessment:    Wound Outcome:    (Retired) Wound Type:    (Retired) Wound Length (cm):    (Retired) Wound Width (cm):    (Retired) Depth (cm):    Wound Description (Comments):    Removal Indications:    Wound Image   07/15/22 1000   Wound WDL ex 07/15/22 1000   Dressing Appearance Intact;Moist drainage 07/15/22 1000   Drainage Amount Scant 07/15/22 1000   Drainage Characteristics/Odor Serosanguineous 07/15/22 1000   Appearance Red;Moist 07/15/22 1000   Tissue loss description Partial thickness 07/15/22 1000   Black (%), Wound Tissue Color 0 % 07/15/22 1000   Red (%), Wound Tissue Color 100 % 07/15/22 1000   Yellow (%), Wound Tissue Color 0 % 07/15/22 1000   Periwound Area Macerated;Pale white 07/15/22 1000   Wound Edges Defined 07/15/22 1000   Wound Length (cm) 0.3 cm 07/15/22 1000   Wound Width (cm) 0.4 cm 07/15/22 1000   Wound Depth (cm) 0.1 cm  07/15/22 1000   Wound Volume (cm^3) 0.012 cm^3 07/15/22 1000   Wound Surface Area (cm^2) 0.12 cm^2 07/15/22 1000   Care Cleansed with:;Antimicrobial agent;Sterile normal saline;Povidone iodine 07/15/22 1000   Dressing Changed 07/15/22 1000   Periwound Care Moisture barrier applied 07/15/22 1000   Compression Tubular elasticized bandage 07/15/22 1000   Off Loading Football dressing;Off loading shoe 07/15/22 1000   Dressing Change Due 07/22/22 07/15/22 1000           Plan:              NEOX OP REPORT    SURGEON: Marivel Peterson DPM  ASSITANT: None  PRE-OP DX: Ulceration secondary to diabetes mellitus and peripheral neuropathy with sluggish response to prior treatment.  POST-OP DX: same.  ANESTHESIA: Pt. has loss of sensation and therefore no anesthesia was required.  HEMOSTASIS: pressure  EBL: less than 5cc.    Time Out performed to verify patient and site and consent obtained.    Procedure: The patient was seen in the wound clinic room and placed in supine position on the treatment table.  Attention was directed to the ulcer which was located on the left foot, where an ulceration measuring  0.3x0.4x0.1 cm is noted.  The ulceration was covered with fibrin and a mild callused rim with periwound maceration.  No acute signs of infection were noted.  The wound is nontender.  Utilizing a 3 mm curette, I debrided 100% of the wound full-thickness through subcutaneous tissue to excise viable and nonviable tissue outside the wound margins.  Patient tolerated well.  The wound was flushed with sterile saline.  There was minimal bleeding with debridement which was well controlled with direct pressure.  A NEOX sheet was then inspected and noted to be in acceptable.  It was then removed sterilely .  The sheet was then fenestrated with the scalpel and then shaped to the wound.  I utilized the entirety with overlapping edges against the wound.  The NEOX was adhered down with saline soaked cotton swabs and then secured in place 3-0  "prolene and covered with adaptic touch non-adherent layer.  The patient having tolerated the procedure well left the clinic with all vital signs stable and vascular status intact to all digits of both feet.     Short-term goals including promoting granulation and epithelialization of the wound. Long term goals include maintaining a healed wound with appropriate offloading and good medical management per internist.    Offloading: darco shoe        Orders Placed This Encounter   Procedures    Change dressing     Scrub with Iodine brush for 2 minutes. Rinse with NS. Cavilon/Benzoin to plantar foot and periwound. Neox with sutures, Adaptic touch, 1/2" steristrips per MD. Custom fit felt pad (with slit cut to allow for drainage) to plantar foot, then Aquacel Extra. Mextra short x1. Football (cast padding x3). Tubigrip single layer, medium gradient, size E (calf size: 36 cm), Coban. CAM boot.        Follow up in about 1 week (around 7/22/2022) for wound care.       "

## 2022-07-20 DIAGNOSIS — E11.9 TYPE 2 DIABETES MELLITUS WITHOUT COMPLICATION: ICD-10-CM

## 2022-07-22 ENCOUNTER — HOSPITAL ENCOUNTER (OUTPATIENT)
Dept: RADIOLOGY | Facility: HOSPITAL | Age: 52
Discharge: HOME | End: 2022-07-22
Attending: PODIATRIST
Payer: MEDICARE

## 2022-07-22 ENCOUNTER — HOSPITAL ENCOUNTER (OUTPATIENT)
Dept: WOUND CARE | Facility: HOSPITAL | Age: 52
Discharge: HOME | End: 2022-07-22
Attending: PODIATRIST
Payer: MEDICARE

## 2022-07-22 VITALS — TEMPERATURE: 97 F | DIASTOLIC BLOOD PRESSURE: 83 MMHG | SYSTOLIC BLOOD PRESSURE: 159 MMHG | HEART RATE: 85 BPM

## 2022-07-22 DIAGNOSIS — L97.422 DIABETIC ULCER OF LEFT MIDFOOT ASSOCIATED WITH TYPE 2 DIABETES MELLITUS, WITH FAT LAYER EXPOSED: ICD-10-CM

## 2022-07-22 DIAGNOSIS — E11.621 DIABETIC ULCER OF LEFT MIDFOOT ASSOCIATED WITH TYPE 2 DIABETES MELLITUS, WITH FAT LAYER EXPOSED: ICD-10-CM

## 2022-07-22 DIAGNOSIS — L97.422 DIABETIC ULCER OF LEFT MIDFOOT ASSOCIATED WITH TYPE 2 DIABETES MELLITUS, WITH FAT LAYER EXPOSED: Primary | ICD-10-CM

## 2022-07-22 DIAGNOSIS — E11.621 DIABETIC ULCER OF RIGHT FOOT ASSOCIATED WITH TYPE 2 DIABETES MELLITUS, WITH FAT LAYER EXPOSED: ICD-10-CM

## 2022-07-22 DIAGNOSIS — I10 ESSENTIAL HYPERTENSION: ICD-10-CM

## 2022-07-22 DIAGNOSIS — E11.621 DIABETIC ULCER OF LEFT MIDFOOT ASSOCIATED WITH TYPE 2 DIABETES MELLITUS, WITH FAT LAYER EXPOSED: Primary | ICD-10-CM

## 2022-07-22 DIAGNOSIS — L97.512 DIABETIC ULCER OF RIGHT FOOT ASSOCIATED WITH TYPE 2 DIABETES MELLITUS, WITH FAT LAYER EXPOSED: ICD-10-CM

## 2022-07-22 PROCEDURE — 73630 X-RAY EXAM OF FOOT: CPT | Mod: TC,FY,RT

## 2022-07-22 PROCEDURE — 87075 CULTR BACTERIA EXCEPT BLOOD: CPT | Performed by: PODIATRIST

## 2022-07-22 PROCEDURE — 87186 SC STD MICRODIL/AGAR DIL: CPT | Performed by: PODIATRIST

## 2022-07-22 PROCEDURE — 87077 CULTURE AEROBIC IDENTIFY: CPT | Performed by: PODIATRIST

## 2022-07-22 PROCEDURE — 73630 XR FOOT COMPLETE 3 VIEW RIGHT: ICD-10-PCS | Mod: 26,RT,, | Performed by: RADIOLOGY

## 2022-07-22 PROCEDURE — 11042 DBRDMT SUBQ TIS 1ST 20SQCM/<: CPT | Performed by: PODIATRIST

## 2022-07-22 PROCEDURE — 73630 X-RAY EXAM OF FOOT: CPT | Mod: 26,RT,, | Performed by: RADIOLOGY

## 2022-07-22 PROCEDURE — 87205 SMEAR GRAM STAIN: CPT | Performed by: PODIATRIST

## 2022-07-22 PROCEDURE — 11042 WOUND DEBRIDEMENT: ICD-10-PCS | Mod: ,,, | Performed by: PODIATRIST

## 2022-07-22 PROCEDURE — 87070 CULTURE OTHR SPECIMN AEROBIC: CPT | Performed by: PODIATRIST

## 2022-07-22 PROCEDURE — 99215 PR OFFICE/OUTPT VISIT, EST, LEVL V, 40-54 MIN: ICD-10-PCS | Mod: 25,,, | Performed by: PODIATRIST

## 2022-07-22 PROCEDURE — 99215 OFFICE O/P EST HI 40 MIN: CPT | Mod: 25,,, | Performed by: PODIATRIST

## 2022-07-22 PROCEDURE — 11042 DBRDMT SUBQ TIS 1ST 20SQCM/<: CPT | Mod: ,,, | Performed by: PODIATRIST

## 2022-07-22 NOTE — PROGRESS NOTES
"[facility]                Progress Note    Subjective:       Patient ID: Indio Ryder Jr. is a 52 y.o. male.    Chief Complaint: Wound Check    Follow up wound care visit. Patient ambulated to exam room without assistance prescribed CAM boot on LLE. Patient denies pain or discomfort at present. He is s/p neox application to plantar lateral left foot wound last week, however presents to clinic with gauze and coban to the right foot.  He states that he is unsure when the wound happened "maybe I got it wet or maybe it's the new shoe insert." he states that on one of the days with significant raining his shoes got wet and he removed and replaced the inserts        Review of Systems   Constitutional: Positive for activity change. Negative for appetite change, chills, fatigue and fever.   HENT: Negative for hearing loss.    Eyes: Negative for photophobia and visual disturbance.   Respiratory: Negative for cough, chest tightness, shortness of breath and wheezing.    Cardiovascular: Positive for leg swelling. Negative for chest pain and palpitations.   Gastrointestinal: Negative for constipation, diarrhea, nausea and vomiting.   Endocrine: Negative for cold intolerance and heat intolerance.   Genitourinary: Negative for flank pain.   Musculoskeletal: Positive for gait problem and myalgias. Negative for neck pain and neck stiffness.   Skin: Positive for wound. Negative for color change.   Neurological: Positive for numbness. Negative for weakness, light-headedness and headaches.        + paresthesia    Psychiatric/Behavioral: Negative for sleep disturbance.           Objective:        Physical Exam  Vitals reviewed.   Constitutional:       Appearance: He is well-developed.   Cardiovascular:      Comments: dorsalis pedis and posterior tibial pulses are palpable bilaterally. Capillary refill time is within normal limits. + pedal hair growth         Musculoskeletal:         " General: No tenderness. Normal range of motion.      Comments: Normal angle, base, station of gait. All toes without clubbing, cyanosis, or signs of ischemia.  No pain to palpation bilateral lower extremities.  Range of motion, stability, muscle strength, and muscle tone normal bilateral feet and legs.    Skin:     General: Skin is warm and dry.      Coloration: Skin is not ashen or cyanotic.      Findings: Wound (see below) present. No rash.      Nails: There is no clubbing.   Neurological:      Mental Status: He is alert and oriented to person, place, and time.      Sensory: No sensory deficit.      Comments: Diminished/loss of protective sensation all toes bilateral to 10 gram monofilament.     Psychiatric:         Behavior: Behavior normal.         Vitals:    07/22/22 1133   BP: (!) 159/83   Pulse: 85   Temp: 97.4 °F (36.3 °C)       Assessment:           ICD-10-CM ICD-9-CM   1. Diabetic ulcer of left midfoot associated with type 2 diabetes mellitus, with fat layer exposed  E11.621 250.80    L97.422 707.14   2. Diabetic ulcer of right foot associated with type 2 diabetes mellitus, with fat layer exposed  E11.621 250.80    L97.512 707.15   3. Essential hypertension  I10 401.9            Wound 04/08/22 1137 Diabetic Ulcer Left plantar;medial Foot (Active)   04/08/22 1137    Pre-existing: Yes   Primary Wound Type: Diabetic ulc   Side: Left   Orientation: plantar;medial   Location: Foot   Wound Number:    Ankle-Brachial Index:    Pulses:    Removal Indication and Assessment:    Wound Outcome:    (Retired) Wound Type:    (Retired) Wound Length (cm):    (Retired) Wound Width (cm):    (Retired) Depth (cm):    Wound Description (Comments):    Removal Indications:    Wound Image   07/22/22 1100   Wound WDL ex 07/22/22 1100   Dressing Appearance Intact;Dried drainage 07/22/22 1100   Drainage Amount Scant 07/22/22 1100   Drainage Characteristics/Odor Brown;No odor 07/22/22 1100   Appearance Sutures intact 07/22/22 1100    Tissue loss description Partial thickness 07/22/22 1100   Periwound Area Pale white 07/22/22 1100   Wound Edges Defined 07/22/22 1100   Care Cleansed with:;Antimicrobial agent;Sterile normal saline 07/22/22 1100   Dressing Changed 07/22/22 1100   Periwound Care Moisture barrier applied 07/22/22 1100   Compression Tubular elasticized bandage 07/22/22 1100   Off Loading Football dressing;Off loading shoe 07/22/22 1100   Dressing Change Due 07/29/22 07/22/22 1100            Wound 05/06/22 1142 Diabetic Ulcer Right Foot (Active)   05/06/22 1142    Pre-existing: Yes   Primary Wound Type: Diabetic ulc   Side: Right   Orientation:    Location: Foot   Wound Number:    Ankle-Brachial Index:    Pulses:    Removal Indication and Assessment:    Wound Outcome:    (Retired) Wound Type:    (Retired) Wound Length (cm):    (Retired) Wound Width (cm):    (Retired) Depth (cm):    Wound Description (Comments):    Removal Indications:    Wound Image    07/22/22 1100   Wound WDL ex 07/22/22 1100   Dressing Appearance Moist drainage 07/22/22 1100   Drainage Amount Moderate 07/22/22 1100   Drainage Characteristics/Odor Yellow;No odor 07/22/22 1100   Appearance Yellow;Slough;Pink 07/22/22 1100   Tissue loss description Partial thickness 07/22/22 1100   Red (%), Wound Tissue Color 20 % 07/22/22 1100   Yellow (%), Wound Tissue Color 80 % 07/22/22 1100   Periwound Area Blistered 07/22/22 1100   Wound Edges Defined 07/22/22 1100   Wound Length (cm) 5 cm 07/22/22 1100   Wound Width (cm) 4 cm 07/22/22 1100   Wound Depth (cm) 0.4 cm 07/22/22 1100   Wound Volume (cm^3) 8 cm^3 07/22/22 1100   Wound Surface Area (cm^2) 20 cm^2 07/22/22 1100   Undermining (depth (cm)/location) 1.2 cm circumference 07/22/22 1100   Care Cleansed with:;Antimicrobial agent;Sterile normal saline 07/22/22 1100   Dressing Applied 07/22/22 1100   Periwound Care Absorptive dressing applied 07/22/22 1100   Off Loading Football dressing;Off loading shoe 07/22/22 1100    Dressing Change Due 07/25/22 07/22/22 1100           Plan:                Greater than 50% of this visit spent on counseling and coordination of care.    Education about the prevention of limb loss.    Discussed wound healing cycle, skin integrity, ways to care for skin. Counseled patient on the effects of blood glucose on healing. He verbalizes understanding that it can increase the chances of delayed healing and this prolonged exposure leads to infection or progression of infection which subsequently can result in loss of limb.    Adequate vitamin supplementation, protein intake, and hydration - discussed with patient    I am concerned about the new wound and potential infection    Full workup to rule out OM including labs. Imaging ordered to rule out bone involvement or gas in the soft tissues.     The wound is cleansed of foreign material as much as possible and the base inspected for bone or abscess. Right Foot wound is deep to the bone with yellow discharge. Aerobic and anerobic cultures swabs taken    Debridement:  procedure note at end    neox in place to left foot, stable    Follow-up: Return to clinic for Nurse Visit to change dressing on Right Foot only Monday 07/25 and MD visit on Friday 07/29, but should call [facility] immediately if any signs of infection, such as fever, chills, sweats, increased redness or pain.    Short-term goals include maintaining good offloading and minimizing bioburden, promoting granulation and epithelialization to healing.  Long-term goals include keeping the wound healed by good offloading and medical management under the direction of internist.    Shoe inspection. Diabetic Foot Education. Patient reminded of the importance of good nutrition and blood sugar control to help prevent podiatric complications of diabetes. Patient instructed on proper foot hygeine. We discussed wearing proper shoe gear, daily foot inspections, never walking without protective shoe gear, never  "putting sharp instruments to feet.        Wound Debridement    Date/Time: 7/22/2022 10:56 AM  Performed by: Marivel Peterson DPM  Authorized by: Marivel Peterson DPM       Wound Details:    Location:  Right foot    Location:  Right Plantar    Type of Debridement:  Excisional       Length (cm):  5       Area (sq cm):  20       Width (cm):  4       Percent Debrided (%):  100       Depth (cm):  0.4       Total Area Debrided (sq cm):  20    Depth of debridement:  Subcutaneous tissue    Tissue debrided:  Dermis, Epidermis and Subcutaneous    Devitalized tissue debrided:  Callus and Fibrin    Instruments:  Curette    Bleeding:  Minimal  Hemostasis Achieved: Yes    Method Used:  Pressure  Patient tolerance:  Patient tolerated the procedure well with no immediate complications      Orders Placed This Encounter   Procedures    Debridement     This order was created via procedure documentation     Standing Status:   Standing     Number of Occurrences:   1    X-Ray Foot Complete Right     Weight Bearing     Standing Status:   Future     Number of Occurrences:   1     Standing Expiration Date:   7/22/2023    CBC auto differential     Standing Status:   Future     Number of Occurrences:   1     Standing Expiration Date:   9/20/2023    COMPREHENSIVE METABOLIC PANEL     Standing Status:   Future     Number of Occurrences:   1     Standing Expiration Date:   9/20/2023    Sedimentation rate     Standing Status:   Future     Number of Occurrences:   1     Standing Expiration Date:   9/20/2023    C-REACTIVE PROTEIN     Standing Status:   Future     Number of Occurrences:   1     Standing Expiration Date:   9/20/2023    Procalcitonin     Standing Status:   Future     Number of Occurrences:   1     Standing Expiration Date:   9/20/2023    Change dressing     Clean with NS. Cavilon/Benzoin to plantar foot and periwound. Neox in place with sutures. Change: Adaptic touch, 1/2" steristrips. Custom fit felt pad (with slit cut to allow " for drainage) to plantar foot, then Aquacel Extra. Mextra short x1. Football (cast padding x3). Tubigrip single layer, medium gradient, size E (calf size: 36 cm), Coban. CAM boot.  Right Foot: Flush with Vashe. Pat dry. Iodoflex to wound bed. Mextra (prn drainage). Abram (x 2 long laid perpendicular). Football dressing (cast padding x 3). Coban. Darco shoe.    Change Right Foot dressing at Nurse Visit.        Follow up in about 3 days (around 7/25/2022) for wound care.

## 2022-07-23 LAB
GRAM STN SPEC: NORMAL

## 2022-07-24 DIAGNOSIS — E78.5 HYPERLIPIDEMIA, UNSPECIFIED HYPERLIPIDEMIA TYPE: Primary | ICD-10-CM

## 2022-07-24 RX ORDER — ATORVASTATIN CALCIUM 80 MG/1
80 TABLET, FILM COATED ORAL DAILY
Qty: 90 TABLET | Refills: 3 | Status: SHIPPED | OUTPATIENT
Start: 2022-07-24 | End: 2023-07-14

## 2022-07-25 ENCOUNTER — TELEPHONE (OUTPATIENT)
Dept: PODIATRY | Facility: CLINIC | Age: 52
End: 2022-07-25
Payer: MEDICARE

## 2022-07-25 ENCOUNTER — HOSPITAL ENCOUNTER (OUTPATIENT)
Dept: WOUND CARE | Facility: HOSPITAL | Age: 52
Discharge: HOME | End: 2022-07-25
Attending: INTERNAL MEDICINE
Payer: MEDICARE

## 2022-07-25 VITALS — TEMPERATURE: 98 F | DIASTOLIC BLOOD PRESSURE: 78 MMHG | HEART RATE: 72 BPM | SYSTOLIC BLOOD PRESSURE: 146 MMHG

## 2022-07-25 DIAGNOSIS — L97.422 DIABETIC ULCER OF LEFT MIDFOOT ASSOCIATED WITH TYPE 2 DIABETES MELLITUS, WITH FAT LAYER EXPOSED: Primary | ICD-10-CM

## 2022-07-25 DIAGNOSIS — L97.412 DIABETIC ULCER OF RIGHT MIDFOOT ASSOCIATED WITH TYPE 2 DIABETES MELLITUS, WITH FAT LAYER EXPOSED: Primary | ICD-10-CM

## 2022-07-25 DIAGNOSIS — E11.621 DIABETIC ULCER OF LEFT MIDFOOT ASSOCIATED WITH TYPE 2 DIABETES MELLITUS, WITH FAT LAYER EXPOSED: Primary | ICD-10-CM

## 2022-07-25 DIAGNOSIS — I10 ESSENTIAL HYPERTENSION: ICD-10-CM

## 2022-07-25 DIAGNOSIS — E11.621 DIABETIC ULCER OF RIGHT MIDFOOT ASSOCIATED WITH TYPE 2 DIABETES MELLITUS, WITH FAT LAYER EXPOSED: Primary | ICD-10-CM

## 2022-07-25 LAB — BACTERIA SPEC AEROBE CULT: ABNORMAL

## 2022-07-25 PROCEDURE — 99212 OFFICE O/P EST SF 10 MIN: CPT

## 2022-07-25 RX ORDER — DOXYCYCLINE HYCLATE 100 MG
100 TABLET ORAL 2 TIMES DAILY
Qty: 30 TABLET | Refills: 0 | Status: SHIPPED | OUTPATIENT
Start: 2022-07-25 | End: 2022-08-12 | Stop reason: ALTCHOICE

## 2022-07-25 NOTE — PROGRESS NOTES
[facility] [address] [place]  [address]  Nurse Visit    Subjective:       Patient seen in clinic today.  Dressing changed on Right Foot as ordered. Patient advised to start Doxycycline PO BID for 15 days per Dr. Peterson, he verbalized understanding.       Assessment:          Wound 05/06/22 1142 Diabetic Ulcer Right Foot (Active)   05/06/22 1142    Pre-existing: Yes   Primary Wound Type: Diabetic ulc   Side: Right   Orientation:    Location: Foot   Wound Number:    Ankle-Brachial Index:    Pulses:    Removal Indication and Assessment:    Wound Outcome:    (Retired) Wound Type:    (Retired) Wound Length (cm):    (Retired) Wound Width (cm):    (Retired) Depth (cm):    Wound Description (Comments):    Removal Indications:    Wound WDL ex 07/25/22 1300   Dressing Appearance Intact;Moist drainage 07/25/22 1300   Drainage Amount Large 07/25/22 1300   Drainage Characteristics/Odor Serosanguineous;No odor 07/25/22 1300   Appearance Pink;Yellow;Slough 07/25/22 1300   Tissue loss description Partial thickness 07/25/22 1300   Periwound Area Dry 07/25/22 1300   Wound Edges Defined 07/25/22 1300   Care Cleansed with:;Antimicrobial agent;Sterile normal saline;Wound cleanser 07/25/22 1300   Dressing Changed 07/25/22 1300   Periwound Care Absorptive dressing applied 07/25/22 1300   Off Loading Football dressing;Off loading shoe 07/25/22 1300   Dressing Change Due 07/29/22 07/25/22 1300           Plan:     No orders of the defined types were placed in this encounter.          Follow up in about 4 days (around 7/29/2022) for wound care.

## 2022-07-25 NOTE — TELEPHONE ENCOUNTER
----- Message from Marivel Peterson DPM sent at 7/25/2022  1:06 PM CDT -----  Culture result positive for bacteria.  Rx for Doxycycline 2 times per day for 15 days sent to his pharmacy

## 2022-07-26 LAB — BACTERIA SPEC ANAEROBE CULT: NORMAL

## 2022-07-29 ENCOUNTER — HOSPITAL ENCOUNTER (OUTPATIENT)
Dept: WOUND CARE | Facility: HOSPITAL | Age: 52
Discharge: HOME | End: 2022-07-29
Attending: PODIATRIST
Payer: MEDICARE

## 2022-07-29 VITALS — DIASTOLIC BLOOD PRESSURE: 86 MMHG | TEMPERATURE: 98 F | SYSTOLIC BLOOD PRESSURE: 134 MMHG | HEART RATE: 101 BPM

## 2022-07-29 DIAGNOSIS — L97.422 DIABETIC ULCER OF LEFT MIDFOOT ASSOCIATED WITH TYPE 2 DIABETES MELLITUS, WITH FAT LAYER EXPOSED: ICD-10-CM

## 2022-07-29 DIAGNOSIS — E11.621 DIABETIC ULCER OF LEFT MIDFOOT ASSOCIATED WITH TYPE 2 DIABETES MELLITUS, WITH FAT LAYER EXPOSED: ICD-10-CM

## 2022-07-29 DIAGNOSIS — E11.621 DIABETIC ULCER OF RIGHT FOOT ASSOCIATED WITH TYPE 2 DIABETES MELLITUS, WITH FAT LAYER EXPOSED: Primary | ICD-10-CM

## 2022-07-29 DIAGNOSIS — L97.512 DIABETIC ULCER OF RIGHT FOOT ASSOCIATED WITH TYPE 2 DIABETES MELLITUS, WITH FAT LAYER EXPOSED: Primary | ICD-10-CM

## 2022-07-29 PROCEDURE — 11042 DBRDMT SUBQ TIS 1ST 20SQCM/<: CPT | Mod: ,,, | Performed by: PODIATRIST

## 2022-07-29 PROCEDURE — 11042 DBRDMT SUBQ TIS 1ST 20SQCM/<: CPT | Performed by: PODIATRIST

## 2022-07-29 PROCEDURE — 99499 UNLISTED E&M SERVICE: CPT | Mod: ,,, | Performed by: PODIATRIST

## 2022-07-29 PROCEDURE — 99499 NO LOS: ICD-10-PCS | Mod: ,,, | Performed by: PODIATRIST

## 2022-07-29 PROCEDURE — 11042 WOUND DEBRIDEMENT: ICD-10-PCS | Mod: ,,, | Performed by: PODIATRIST

## 2022-07-29 NOTE — PROGRESS NOTES
[facility]                Progress Note    Subjective:       Patient ID: Indio Ryder Jr. is a 52 y.o. male.    Chief Complaint: Wound Care    Walked to clinic wearing CAM boot to left foot and Darco to right.  Has been taking abx as prescribed and avoiding excessive ambulation.       Review of Systems   Constitutional: Negative for appetite change, chills, fatigue and fever.   HENT: Negative for hearing loss.    Eyes: Negative for photophobia and visual disturbance.   Respiratory: Negative for cough, chest tightness, shortness of breath and wheezing.    Cardiovascular: Positive for leg swelling. Negative for chest pain and palpitations.   Gastrointestinal: Negative for constipation, diarrhea, nausea and vomiting.   Endocrine: Negative for cold intolerance and heat intolerance.   Genitourinary: Negative for flank pain.   Musculoskeletal: Positive for gait problem and myalgias. Negative for neck pain and neck stiffness.   Skin: Positive for wound. Negative for color change.   Neurological: Positive for numbness. Negative for weakness, light-headedness and headaches.        + paresthesia    Psychiatric/Behavioral: Negative for sleep disturbance.         Objective:        Physical Exam  Vitals reviewed.   Constitutional:       Appearance: He is well-developed.   Cardiovascular:      Comments: dorsalis pedis and posterior tibial pulses are palpable bilaterally. Capillary refill time is within normal limits. + pedal hair growth         Musculoskeletal:         General: No tenderness. Normal range of motion.      Comments: Normal angle, base, station of gait. All toes without clubbing, cyanosis, or signs of ischemia.  No pain to palpation bilateral lower extremities.  Range of motion, stability, muscle strength, and muscle tone normal bilateral feet and legs.    Skin:     General: Skin is warm and dry.      Coloration: Skin is not ashen or cyanotic.      Findings: Wound  (see below) present. No rash.      Nails: There is no clubbing.   Neurological:      Mental Status: He is alert and oriented to person, place, and time.      Sensory: No sensory deficit.      Comments: Diminished/loss of protective sensation all toes bilateral to 10 gram monofilament.     Psychiatric:         Behavior: Behavior normal.           Vitals:    07/29/22 1034   BP: 134/86   Pulse: 101   Temp: 97.7 °F (36.5 °C)       Assessment:           ICD-10-CM ICD-9-CM   1. Diabetic ulcer of right foot associated with type 2 diabetes mellitus, with fat layer exposed  E11.621 250.80    L97.512 707.15   2. Diabetic ulcer of left midfoot associated with type 2 diabetes mellitus, with fat layer exposed  E11.621 250.80    L97.422 707.14            Wound 04/08/22 1137 Diabetic Ulcer Left plantar;medial Foot (Active)   04/08/22 1137    Pre-existing: Yes   Primary Wound Type: Diabetic ulc   Side: Left   Orientation: plantar;medial   Location: Foot   Wound Number:    Ankle-Brachial Index:    Pulses:    Removal Indication and Assessment:    Wound Outcome:    (Retired) Wound Type:    (Retired) Wound Length (cm):    (Retired) Wound Width (cm):    (Retired) Depth (cm):    Wound Description (Comments):    Removal Indications:    Wound Image   07/29/22 1000   Wound WDL ex 07/29/22 1000   Dressing Appearance Intact;Dry 07/29/22 1000   Drainage Amount Scant 07/29/22 1000   Drainage Characteristics/Odor Brown 07/29/22 1000   Tissue loss description Partial thickness 07/29/22 1000   Periwound Area Pale white 07/29/22 1000   Wound Edges Defined 07/29/22 1000   Wound Length (cm) 0.2 cm 07/29/22 1000   Wound Width (cm) 0.2 cm 07/29/22 1000   Wound Depth (cm) 0.1 cm 07/29/22 1000   Wound Volume (cm^3) 0.004 cm^3 07/29/22 1000   Wound Surface Area (cm^2) 0.04 cm^2 07/29/22 1000   Care Cleansed with:;Antimicrobial agent;Sterile normal saline 07/29/22 1000   Periwound Care Moisture barrier applied 07/29/22 1000   Compression Tubular  elasticized bandage 07/29/22 1000   Off Loading Football dressing;Off loading shoe 07/29/22 1000   Dressing Change Due 08/05/22 07/29/22 1000            Wound 05/06/22 1142 Diabetic Ulcer Right Foot (Active)   05/06/22 1142    Pre-existing: Yes   Primary Wound Type: Diabetic ulc   Side: Right   Orientation:    Location: Foot   Wound Number:    Ankle-Brachial Index:    Pulses:    Removal Indication and Assessment:    Wound Outcome:    (Retired) Wound Type:    (Retired) Wound Length (cm):    (Retired) Wound Width (cm):    (Retired) Depth (cm):    Wound Description (Comments):    Removal Indications:    Wound Image   07/29/22 1100   Wound WDL ex 07/29/22 1100   Dressing Appearance Dry;Intact 07/29/22 1100   Drainage Amount Moderate 07/29/22 1100   Drainage Characteristics/Odor Serosanguineous 07/29/22 1100   Appearance Pink 07/29/22 1100   Tissue loss description Partial thickness 07/29/22 1100   Black (%), Wound Tissue Color 0 % 07/29/22 1000   Red (%), Wound Tissue Color 100 % 07/29/22 1100   Yellow (%), Wound Tissue Color 0 % 07/29/22 1000   Periwound Area Dry 07/29/22 1000   Wound Edges Callused;Open 07/29/22 1100   Wound Length (cm) 3.8 cm 07/29/22 1100   Wound Width (cm) 5 cm 07/29/22 1100   Wound Depth (cm) 0.3 cm 07/29/22 1100   Wound Volume (cm^3) 5.7 cm^3 07/29/22 1100   Wound Surface Area (cm^2) 19 cm^2 07/29/22 1100   Care Cleansed with:;Antimicrobial agent;Sterile normal saline 07/29/22 1100   Dressing Changed 07/29/22 1100   Off Loading Football dressing;Off loading shoe 07/29/22 1100   Dressing Change Due 08/05/22 07/29/22 1100           Plan:                Wound Debridement    Date/Time: 7/29/2022 10:27 AM  Performed by: Marivel Peterson DPM  Authorized by: Marivel Peterson DPM     Consent Done?:  Yes (Written)    Wound Details:    Location:  Right foot    Location:  Right Plantar    Type of Debridement:  Excisional       Length (cm):  3.8       Area (sq cm):  19       Width (cm):  5       Percent Debrided  (%):  100       Depth (cm):  0.3       Total Area Debrided (sq cm):  19    Depth of debridement:  Subcutaneous tissue    Tissue debrided:  Dermis, Epidermis and Subcutaneous    Devitalized tissue debrided:  Biofilm, Callus and Fibrin    Bleeding:  Minimal  Hemostasis Achieved: Yes    Method Used:  Pressure  Patient tolerance:  Patient tolerated the procedure well with no immediate complications              Orders Placed This Encounter   Procedures    Change dressing     Comments    Clean with NS. Cavilon/Benzoin to plantar foot and periwound. Neox in place with sutures.   Change left foot: U pad Iodosorb with Endoform. Mextra short x1. Football (cast padding x3). . Tubigrip single layer, medium gradient, size E , Coban. CAM boot.   Right Foot: Flush with Vashe. Pat dry. Iodoflex  and Endoform to wound bed. Mextra (prn drainage). Abram (x 2 long laid perpendicular). Football dressing (cast padding x 3). Coban. Tubigrip in medium gradient size E Darco shoe.     Change Right Foot dressing at Nurse Visit.        Follow up in about 1 week (around 8/5/2022).

## 2022-07-31 ENCOUNTER — TELEPHONE (OUTPATIENT)
Dept: INTERNAL MEDICINE | Facility: CLINIC | Age: 52
End: 2022-07-31
Payer: MEDICARE

## 2022-07-31 NOTE — TELEPHONE ENCOUNTER
He did not seeMO message.  pls review with him      Mr Elphage-   Cholesterol is too high.  Pls restart Atorvastatin.I sent to [facility].  NEr Elphage-   Cholesterol is too high.  Pls restart Atorvastatin.I sent to [facility].  NE

## 2022-08-01 NOTE — TELEPHONE ENCOUNTER
Called and discussed test results and recommendations with the pt. The pt expressed understanding.     Pt has picked up the Rx and already began taking the medication.

## 2022-08-03 ENCOUNTER — HOSPITAL ENCOUNTER (OUTPATIENT)
Dept: WOUND CARE | Facility: HOSPITAL | Age: 52
Discharge: HOME | End: 2022-08-03
Attending: FAMILY MEDICINE
Payer: MEDICARE

## 2022-08-03 PROCEDURE — 99214 OFFICE O/P EST MOD 30 MIN: CPT

## 2022-08-03 NOTE — PROGRESS NOTES
[facility]  [address]  [address]  Nurse Visit    Subjective:       Patient seen in clinic today.  Dressing changed as ordered. Walked to clinic unaided Dsg dry and intact Right dsg changed and left left until Friday No drainage.      Assessment:          Wound 05/06/22 1142 Diabetic Ulcer Right Foot (Active)   05/06/22 1142    Pre-existing: Yes   Primary Wound Type: Diabetic ulc   Side: Right   Orientation:    Location: Foot   Wound Number:    Ankle-Brachial Index:    Pulses:    Removal Indication and Assessment:    Wound Outcome:    (Retired) Wound Type:    (Retired) Wound Length (cm):    (Retired) Wound Width (cm):    (Retired) Depth (cm):    Wound Description (Comments):    Removal Indications:    Wound WDL ex 08/03/22 1000   Dressing Appearance Dry;Intact 08/03/22 1000   Drainage Amount Scant 08/03/22 1000   Appearance Pink 08/03/22 1000   Tissue loss description Partial thickness 08/03/22 1000   Red (%), Wound Tissue Color 100 % 08/03/22 1000   Periwound Area Macerated;Pale white 08/03/22 1000   Wound Edges Defined 08/03/22 1000   Care Cleansed with:;Antimicrobial agent;Sterile normal saline 08/03/22 1000   Dressing Changed 08/03/22 1000   Off Loading Football dressing;Off loading shoe 08/03/22 1000   Dressing Change Due 08/05/22 08/03/22 1000           Plan:     No orders of the defined types were placed in this encounter.          Follow up in about 2 days (around 8/5/2022).

## 2022-08-03 NOTE — TELEPHONE ENCOUNTER
"Returned sister's call, informed her that I had spoken with [facility] and that I was sending requested info to them and that they were going to "run it by their corporate office" and would get back to both her and me.  Questions answered re Covid Positive diagnosis to the best of my ability.  Phone numbers for PCP and ID physicians on his case provided.  Voices no further questions at this time.  " Right femoral artery angiogram was performed  using a Introducer Shth 6fr 50cm 11cm Grn Hub Snpft Dil by hand injection. The access site was not suitable for closure. The sheath will be removed manually.

## 2022-08-04 ENCOUNTER — TELEPHONE (OUTPATIENT)
Dept: INTERNAL MEDICINE | Facility: CLINIC | Age: 52
End: 2022-08-04
Payer: MEDICARE

## 2022-08-04 NOTE — TELEPHONE ENCOUNTER
----- Message from JESUS Bernardo, RONNY sent at 8/4/2022 11:58 AM CDT -----  Regarding: f/u for bg readings  Check in with pt for blood glucose levels in the past week am and pm levels  What medications is he consistently taking for diabetes management?  Keep me posted  Gigi

## 2022-08-05 ENCOUNTER — HOSPITAL ENCOUNTER (OUTPATIENT)
Dept: WOUND CARE | Facility: HOSPITAL | Age: 52
Discharge: HOME | End: 2022-08-05
Attending: PODIATRIST
Payer: MEDICARE

## 2022-08-05 VITALS — HEART RATE: 80 BPM | TEMPERATURE: 98 F | SYSTOLIC BLOOD PRESSURE: 180 MMHG | DIASTOLIC BLOOD PRESSURE: 78 MMHG

## 2022-08-05 DIAGNOSIS — L97.529 DIABETIC ULCER OF LEFT FOOT: ICD-10-CM

## 2022-08-05 DIAGNOSIS — E11.621 DIABETIC ULCER OF LEFT FOOT: ICD-10-CM

## 2022-08-05 DIAGNOSIS — L97.412 DIABETIC ULCER OF RIGHT MIDFOOT ASSOCIATED WITH DIABETES MELLITUS DUE TO UNDERLYING CONDITION, WITH FAT LAYER EXPOSED: Primary | ICD-10-CM

## 2022-08-05 DIAGNOSIS — E08.621 DIABETIC ULCER OF RIGHT MIDFOOT ASSOCIATED WITH DIABETES MELLITUS DUE TO UNDERLYING CONDITION, WITH FAT LAYER EXPOSED: Primary | ICD-10-CM

## 2022-08-05 PROCEDURE — 99215 OFFICE O/P EST HI 40 MIN: CPT | Performed by: FAMILY MEDICINE

## 2022-08-05 NOTE — PROGRESS NOTES
[facility]                Progress Note    Subjective:       Patient ID: Indio Ryder Jr. is a 52 y.o. male.    Chief Complaint:   Walked to clinic unaided with no complains pain. Right plantar foot red with skin migrating in from edges Gentian Violet reapplied to macerated area. Endoform and Iodoform applied. Left plantar foot appears healed but blister to lateral foot when pressure placed on wound site plantar blister inflating. Gentian Violet  To blister and Aquacel AG placed with football and Tubigrip bilarally.      Review of Systems      Objective:        Physical Exam    Vitals:    08/05/22 1130   BP: (!) 180/78   Pulse: 80   Temp: 97.7 °F (36.5 °C)       Assessment:           ICD-10-CM ICD-9-CM   1. Diabetic ulcer of right midfoot associated with diabetes mellitus due to underlying condition, with fat layer exposed  E08.621 249.80    L97.412 707.14   2. Diabetic ulcer of left foot  E11.621 250.80    L97.529 707.15            Wound 04/08/22 1137 Diabetic Ulcer Left plantar;medial Foot (Active)   04/08/22 1137    Pre-existing: Yes   Primary Wound Type: Diabetic ulc   Side: Left   Orientation: plantar;medial   Location: Foot   Wound Number:    Ankle-Brachial Index:    Pulses:    Removal Indication and Assessment:    Wound Outcome:    (Retired) Wound Type:    (Retired) Wound Length (cm):    (Retired) Wound Width (cm):    (Retired) Depth (cm):    Wound Description (Comments):    Removal Indications:    Wound WDL ex 08/05/22 1100   Dressing Appearance Dry;Intact 08/05/22 1100   Drainage Amount None 08/05/22 1100   Appearance Red 08/05/22 1100   Tissue loss description Partial thickness 08/05/22 1100   Red (%), Wound Tissue Color 100 % 08/05/22 1100   Periwound Area Macerated;Blistered 08/05/22 1100   Wound Length (cm) 0.1 cm 08/05/22 1100   Wound Width (cm) 0.1 cm 08/05/22 1100   Wound Depth (cm) 0.1 cm 08/05/22 1100   Wound Volume (cm^3) 0.001 cm^3 08/05/22 1100    Wound Surface Area (cm^2) 0.01 cm^2 08/05/22 1100   Care Cleansed with:;Antimicrobial agent;Sterile normal saline 08/05/22 1100   Dressing Changed 08/05/22 1100   Compression Tubular elasticized bandage 08/05/22 1100   Off Loading Off loading shoe;Football dressing 08/05/22 1100   Dressing Change Due 08/12/22 08/05/22 1100            Wound 05/06/22 1142 Diabetic Ulcer Right Foot (Active)   05/06/22 1142    Pre-existing: Yes   Primary Wound Type: Diabetic ulc   Side: Right   Orientation:    Location: Foot   Wound Number:    Ankle-Brachial Index:    Pulses:    Removal Indication and Assessment:    Wound Outcome:    (Retired) Wound Type:    (Retired) Wound Length (cm):    (Retired) Wound Width (cm):    (Retired) Depth (cm):    Wound Description (Comments):    Removal Indications:    Wound WDL ex 08/05/22 1100   Dressing Appearance Dry;Intact 08/05/22 1100   Drainage Amount Scant 08/05/22 1100   Appearance Pink;Epithelialization 08/05/22 1100   Tissue loss description Partial thickness 08/05/22 1100   Red (%), Wound Tissue Color 100 % 08/05/22 1100   Periwound Area Macerated 08/05/22 1100   Wound Edges Defined 08/05/22 1100   Wound Length (cm) 3.6 cm 08/05/22 1100   Wound Width (cm) 3.9 cm 08/05/22 1100   Wound Depth (cm) 0.3 cm 08/05/22 1100   Wound Volume (cm^3) 4.212 cm^3 08/05/22 1100   Wound Surface Area (cm^2) 14.04 cm^2 08/05/22 1100   Care Cleansed with:;Antimicrobial agent;Sterile normal saline 08/05/22 1100   Dressing Changed 08/05/22 1100   Off Loading Football dressing 08/05/22 1100   Dressing Change Due 08/12/22 08/05/22 1100           Plan:                Orders Placed This Encounter   Procedures    Change dressing     Wound Dressing Orders    Dressing change frequency weekly  Remove old dressing  Cleanse or irrigate with:saline  Protect periwound with:  Gentian Violet  Primary dressing - adjust to fit: Endoform and Iodosorb to right foot Aquacel AG to left  Secondary dressing: Bilaterall football and  Tubigrip in E  Off-load: CAM boot and Darco  Compression: Tubigrip in E        Follow up in about 1 week (around 8/12/2022).

## 2022-08-12 ENCOUNTER — HOSPITAL ENCOUNTER (OUTPATIENT)
Dept: WOUND CARE | Facility: HOSPITAL | Age: 52
Discharge: HOME | End: 2022-08-12
Attending: PODIATRIST
Payer: MEDICARE

## 2022-08-12 VITALS
TEMPERATURE: 98 F | SYSTOLIC BLOOD PRESSURE: 165 MMHG | HEART RATE: 88 BPM | DIASTOLIC BLOOD PRESSURE: 96 MMHG | RESPIRATION RATE: 20 BRPM

## 2022-08-12 DIAGNOSIS — L97.422 DIABETIC ULCER OF LEFT MIDFOOT ASSOCIATED WITH TYPE 2 DIABETES MELLITUS, WITH FAT LAYER EXPOSED: ICD-10-CM

## 2022-08-12 DIAGNOSIS — E08.621 DIABETIC ULCER OF RIGHT MIDFOOT ASSOCIATED WITH DIABETES MELLITUS DUE TO UNDERLYING CONDITION, WITH FAT LAYER EXPOSED: Primary | ICD-10-CM

## 2022-08-12 DIAGNOSIS — L97.412 DIABETIC ULCER OF RIGHT MIDFOOT ASSOCIATED WITH DIABETES MELLITUS DUE TO UNDERLYING CONDITION, WITH FAT LAYER EXPOSED: Primary | ICD-10-CM

## 2022-08-12 DIAGNOSIS — E11.621 DIABETIC ULCER OF LEFT MIDFOOT ASSOCIATED WITH TYPE 2 DIABETES MELLITUS, WITH FAT LAYER EXPOSED: ICD-10-CM

## 2022-08-12 PROCEDURE — 11042 DBRDMT SUBQ TIS 1ST 20SQCM/<: CPT | Performed by: PODIATRIST

## 2022-08-12 PROCEDURE — 99499 UNLISTED E&M SERVICE: CPT | Mod: ,,, | Performed by: PODIATRIST

## 2022-08-12 PROCEDURE — 99499 NO LOS: ICD-10-PCS | Mod: ,,, | Performed by: PODIATRIST

## 2022-08-12 PROCEDURE — 11042 DBRDMT SUBQ TIS 1ST 20SQCM/<: CPT | Mod: ,,, | Performed by: PODIATRIST

## 2022-08-12 PROCEDURE — 11042 DEBRIDEMENT: ICD-10-PCS | Mod: ,,, | Performed by: PODIATRIST

## 2022-08-12 RX ORDER — MINOCYCLINE HYDROCHLORIDE 100 MG/1
100 TABLET ORAL 2 TIMES DAILY
Qty: 60 TABLET | Refills: 0 | Status: SHIPPED | OUTPATIENT
Start: 2022-08-12 | End: 2022-08-12

## 2022-08-12 RX ORDER — MINOCYCLINE HYDROCHLORIDE 100 MG/1
100 CAPSULE ORAL EVERY 12 HOURS
Qty: 60 CAPSULE | Refills: 0 | Status: SHIPPED | OUTPATIENT
Start: 2022-08-12 | End: 2022-10-14 | Stop reason: ALTCHOICE

## 2022-08-12 NOTE — PROGRESS NOTES
[facility]                Progress Note    Subjective:       Patient ID: Indio Ryder Jr. is a 52 y.o. male.    Chief Complaint: Wound Check    F/u wound care visit. Patient ambulatory to exam room with darco on right foot and cam boot on left foot as ordered. Patient denies pain or discomfort at present. Wound dressing to right foot intact with moderate amount of drainage noted. Wound to right plantar foot measuring smaller in all dimensions. Wound dressing LLE intact but saturated with water. Patient states he does not recall getting foot wet but unable to feel wetness due to neuropathy to feet and hands. Wound to left lateral plantar foot measuring larger in all dimensions with maceration noted to half of plantar foot.       Review of Systems   Constitutional: Negative for appetite change, chills, fatigue and fever.   HENT: Negative for hearing loss.    Eyes: Negative for photophobia and visual disturbance.   Respiratory: Negative for cough, chest tightness, shortness of breath and wheezing.    Cardiovascular: Positive for leg swelling. Negative for chest pain and palpitations.   Gastrointestinal: Negative for constipation, diarrhea, nausea and vomiting.   Endocrine: Negative for cold intolerance and heat intolerance.   Genitourinary: Negative for flank pain.   Musculoskeletal: Positive for gait problem and myalgias. Negative for neck pain and neck stiffness.   Skin: Positive for wound. Negative for color change.   Neurological: Positive for numbness. Negative for weakness, light-headedness and headaches.        + paresthesia    Psychiatric/Behavioral: Negative for sleep disturbance.         Objective:        Physical Exam  Vitals reviewed.   Constitutional:       Appearance: He is well-developed.   Cardiovascular:      Comments: dorsalis pedis and posterior tibial pulses are palpable bilaterally. Capillary refill time is within normal limits. + pedal hair  growth         Musculoskeletal:         General: No tenderness. Normal range of motion.      Comments: Normal angle, base, station of gait. All toes without clubbing, cyanosis, or signs of ischemia.  No pain to palpation bilateral lower extremities.  Range of motion, stability, muscle strength, and muscle tone normal bilateral feet and legs.    Skin:     General: Skin is warm and dry.      Coloration: Skin is not ashen or cyanotic.      Findings: Wound (see below) present. No rash.      Nails: There is no clubbing.   Neurological:      Mental Status: He is alert and oriented to person, place, and time.      Sensory: No sensory deficit.      Comments: Diminished/loss of protective sensation all toes bilateral to 10 gram monofilament.     Psychiatric:         Behavior: Behavior normal.           Vitals:    08/12/22 1006   BP: (!) 165/96   Pulse: 88   Resp: 20   Temp: 97.7 °F (36.5 °C)       Assessment:           ICD-10-CM ICD-9-CM   1. Diabetic ulcer of right midfoot associated with diabetes mellitus due to underlying condition, with fat layer exposed  E08.621 249.80    L97.412 707.14   2. Diabetic ulcer of left midfoot associated with type 2 diabetes mellitus, with fat layer exposed  E11.621 250.80    L97.422 707.14            Wound 04/08/22 1137 Diabetic Ulcer Left plantar;medial Foot (Active)   04/08/22 1137    Pre-existing: Yes   Primary Wound Type: Diabetic ulc   Side: Left   Orientation: plantar;medial   Location: Foot   Wound Number:    Ankle-Brachial Index:    Pulses:    Removal Indication and Assessment:    Wound Outcome:    (Retired) Wound Type:    (Retired) Wound Length (cm):    (Retired) Wound Width (cm):    (Retired) Depth (cm):    Wound Description (Comments):    Removal Indications:    Wound Image    08/12/22 1000   Wound WDL ex 08/12/22 1000   Dressing Appearance Intact;Saturated 08/12/22 1000   Drainage Amount Large 08/12/22 1000   Drainage Characteristics/Odor Serous 08/12/22 1000   Appearance Red  08/12/22 1000   Tissue loss description Not applicable 08/12/22 1000   Black (%), Wound Tissue Color 0 % 08/12/22 1000   Red (%), Wound Tissue Color 100 % 08/12/22 1000   Yellow (%), Wound Tissue Color 0 % 08/12/22 1000   Periwound Area Macerated;Moist 08/12/22 1000   Wound Edges Defined 08/12/22 1000   Wound Length (cm) 2.5 cm 08/12/22 1000   Wound Width (cm) 3 cm 08/12/22 1000   Wound Depth (cm) 0.2 cm 08/12/22 1000   Wound Volume (cm^3) 1.5 cm^3 08/12/22 1000   Wound Surface Area (cm^2) 7.5 cm^2 08/12/22 1000   Care Cleansed with:;Soap and water;Sterile normal saline 08/12/22 1000   Dressing Applied 08/12/22 1000   Periwound Care Moisture barrier applied 08/12/22 1000   Off Loading Football dressing;Off loading shoe 08/12/22 1000   Dressing Change Due 08/17/22 08/12/22 1000            Wound 05/06/22 1142 Diabetic Ulcer Right plantar Foot (Active)   05/06/22 1142    Pre-existing: Yes   Primary Wound Type: Diabetic ulc   Side: Right   Orientation: plantar   Location: Foot   Wound Number:    Ankle-Brachial Index:    Pulses:    Removal Indication and Assessment:    Wound Outcome:    (Retired) Wound Type:    (Retired) Wound Length (cm):    (Retired) Wound Width (cm):    (Retired) Depth (cm):    Wound Description (Comments):    Removal Indications:    Wound Image   08/12/22 1000   Wound WDL ex 08/12/22 1000   Dressing Appearance Intact;Moist drainage 08/12/22 1000   Drainage Amount Moderate 08/12/22 1000   Drainage Characteristics/Odor Serosanguineous 08/12/22 1000   Appearance Red 08/12/22 1000   Tissue loss description Partial thickness 08/12/22 1000   Black (%), Wound Tissue Color 0 % 08/12/22 1000   Red (%), Wound Tissue Color 100 % 08/12/22 1000   Yellow (%), Wound Tissue Color 0 % 08/12/22 1000   Periwound Area Macerated;Pale white 08/12/22 1000   Wound Edges Defined 08/12/22 1000   Wound Length (cm) 1.8 cm 08/12/22 1000   Wound Width (cm) 1.4 cm 08/12/22 1000   Wound Depth (cm) 0.2 cm 08/12/22 1000   Wound  "Volume (cm^3) 0.504 cm^3 08/12/22 1000   Wound Surface Area (cm^2) 2.52 cm^2 08/12/22 1000   Care Cleansed with:;Antimicrobial agent;Soap and water;Sterile normal saline 08/12/22 1000   Dressing Applied 08/12/22 1000   Periwound Care Moisture barrier applied 08/12/22 1000   Compression Tubular elasticized bandage 08/12/22 1000   Off Loading Football dressing;Off loading shoe 08/12/22 1000   Dressing Change Due 08/17/22 08/12/22 1000           Plan:            Patient now with regression to LLE due to dressing getting wet and macerating plantar foot.     New cam boot for left foot given to patient.     RTC on Wednesday 8/17/22 for nurse visit and one week for MD visit.     Debridement    Date/Time: 8/12/2022 9:30 AM  Performed by: Marivel Peterson DPM  Authorized by: Marivel Peterson DPM     Time out: Immediately prior to procedure a "time out" was called to verify the correct patient, procedure, equipment, support staff and site/side marked as required.    Consent Done?:  Yes (Written)    Preparation: Patient was prepped and draped in usual sterile fashion    Local anesthesia used?: No      Wound Details:    Location:  Right foot    Location:  Right 3rd Metatarsal Head    Type of Debridement:  Excisional       Length (cm):  1.8       Area (sq cm):  2.52       Width (cm):  1.4       Percent Debrided (%):  100       Depth (cm):  0.2       Total Area Debrided (sq cm):  2.52    Depth of debridement:  Subcutaneous tissue    Tissue debrided:  Dermis, Epidermis and Subcutaneous    Devitalized tissue debrided:  Callus and Fibrin    Instruments:  Curette    Additional wounds:  1    2nd Wound Details:     Location:  Left foot    Location:  Left Midfoot    Location:  Left Midfoot    Type of Debridement:  Excisional       Length (cm):  2.5       Area (sq cm):  7.5       Width (cm):  3       Percent Debrided (%):  100       Depth (cm):  0.2       Total Area Debrided (sq cm):  7.5    Depth of debridement:  Subcutaneous tissue    " Tissue debrided:  Subcutaneous, Dermis and Epidermis    Devitalized tissue debrided:  Callus and Fibrin    Instruments:  Curette    Bleeding:  Minimal  Hemostasis Achieved: Yes    Method Used:  Pressure  Patient tolerance:  Patient tolerated the procedure well with no immediate complications        Orders Placed This Encounter   Procedures    Debridement     This order was created via procedure documentation     Standing Status:   Standing     Number of Occurrences:   1    Change dressing     Clean with NS. Dakin's soak x10 minutes. Right foot: Gentian violet to periwound. Endoform/Iodosorb to wound bed. Mextra short x1. Football dressing (cast padding x3), Tubigrip size E. Darco. Left plantar foot: Gentian violet to all wet area of plantar foot and periwound. Iodosorb to wound bed, mextra long x1. Football dressing (cast padding x3, stockinet). Cam boot.    Change at nurse visit.        Follow up in about 5 days (around 8/17/2022) for nurse visit.

## 2022-08-17 ENCOUNTER — HOSPITAL ENCOUNTER (OUTPATIENT)
Dept: WOUND CARE | Facility: HOSPITAL | Age: 52
Discharge: HOME | End: 2022-08-17
Attending: PODIATRIST
Payer: MEDICARE

## 2022-08-17 VITALS
TEMPERATURE: 98 F | RESPIRATION RATE: 20 BRPM | DIASTOLIC BLOOD PRESSURE: 92 MMHG | SYSTOLIC BLOOD PRESSURE: 170 MMHG | HEART RATE: 92 BPM

## 2022-08-17 DIAGNOSIS — L97.412 DIABETIC ULCER OF RIGHT MIDFOOT ASSOCIATED WITH DIABETES MELLITUS DUE TO UNDERLYING CONDITION, WITH FAT LAYER EXPOSED: Primary | ICD-10-CM

## 2022-08-17 DIAGNOSIS — E08.621 DIABETIC ULCER OF RIGHT MIDFOOT ASSOCIATED WITH DIABETES MELLITUS DUE TO UNDERLYING CONDITION, WITH FAT LAYER EXPOSED: Primary | ICD-10-CM

## 2022-08-17 NOTE — PROGRESS NOTES
[facility] [address]  [id number]  Nurse Visit    Subjective:       Patient seen in clinic today.  Dressing changed as ordered.      Assessment:          Wound 04/08/22 1137 Diabetic Ulcer Left plantar;medial Foot (Active)   04/08/22 1137    Pre-existing: Yes   Primary Wound Type: Diabetic ulc   Side: Left   Orientation: plantar;medial   Location: Foot   Wound Number:    Ankle-Brachial Index:    Pulses:    Removal Indication and Assessment:    Wound Outcome:    (Retired) Wound Type:    (Retired) Wound Length (cm):    (Retired) Wound Width (cm):    (Retired) Depth (cm):    Wound Description (Comments):    Removal Indications:    Wound WDL ex 08/17/22 1000   Dressing Appearance Intact;Moist drainage 08/17/22 1000   Drainage Amount Large 08/17/22 1000   Drainage Characteristics/Odor Serosanguineous 08/17/22 1000   Appearance Red;White 08/17/22 1000   Tissue loss description Partial thickness 08/17/22 1000   Black (%), Wound Tissue Color 0 % 08/17/22 1000   Red (%), Wound Tissue Color 100 % 08/17/22 1000   Yellow (%), Wound Tissue Color 0 % 08/17/22 1000   Periwound Area Macerated;Pale white 08/17/22 1000   Wound Edges Defined 08/17/22 1000   Care Cleansed with:;Soap and water;Sterile normal saline 08/17/22 1000   Dressing Changed 08/17/22 1000   Periwound Care Moisture barrier applied 08/17/22 1000   Off Loading Football dressing;Off loading shoe 08/17/22 1000   Dressing Change Due 08/19/22 08/17/22 1000            Wound 05/06/22 1142 Diabetic Ulcer Right plantar Foot (Active)   05/06/22 1142    Pre-existing: Yes   Primary Wound Type: Diabetic ulc   Side: Right   Orientation: plantar   Location: Foot   Wound Number:    Ankle-Brachial Index:    Pulses:    Removal Indication and Assessment:    Wound Outcome:    (Retired) Wound Type:    (Retired) Wound Length (cm):    (Retired) Wound Width (cm):    (Retired) Depth (cm):    Wound Description (Comments):    Removal Indications:     Wound WDL ex 08/17/22 1000   Dressing Appearance Intact;Moist drainage 08/17/22 1000   Drainage Amount Large 08/17/22 1000   Drainage Characteristics/Odor Serosanguineous 08/17/22 1000   Appearance Pink;Yellow;Bone 08/17/22 1000   Tissue loss description Full thickness 08/17/22 1000   Black (%), Wound Tissue Color 0 % 08/17/22 1000   Red (%), Wound Tissue Color 95 % 08/17/22 1000   Yellow (%), Wound Tissue Color 5 % 08/17/22 1000   Periwound Area Macerated;Pale white 08/17/22 1000   Wound Edges Defined 08/17/22 1000   Care Cleansed with:;Soap and water;Sterile normal saline 08/17/22 1000   Dressing Changed 08/17/22 1000   Periwound Care Moisture barrier applied 08/17/22 1000   Compression Tubular elasticized bandage 08/17/22 1000   Off Loading Football dressing;Off loading shoe 08/17/22 1000   Dressing Change Due 08/19/22 08/17/22 1000           Plan:     No orders of the defined types were placed in this encounter.          Follow up in about 2 days (around 8/19/2022) for wound care visit.

## 2022-08-19 ENCOUNTER — HOSPITAL ENCOUNTER (OUTPATIENT)
Dept: WOUND CARE | Facility: HOSPITAL | Age: 52
Discharge: HOME | End: 2022-08-19
Attending: PODIATRIST
Payer: MEDICARE

## 2022-08-19 VITALS — SYSTOLIC BLOOD PRESSURE: 154 MMHG | DIASTOLIC BLOOD PRESSURE: 90 MMHG | TEMPERATURE: 98 F | HEART RATE: 80 BPM

## 2022-08-19 DIAGNOSIS — E11.621 DIABETIC ULCER OF RIGHT FOOT ASSOCIATED WITH TYPE 2 DIABETES MELLITUS, WITH FAT LAYER EXPOSED: ICD-10-CM

## 2022-08-19 DIAGNOSIS — L97.422 DIABETIC ULCER OF LEFT MIDFOOT ASSOCIATED WITH TYPE 2 DIABETES MELLITUS, WITH FAT LAYER EXPOSED: ICD-10-CM

## 2022-08-19 DIAGNOSIS — E11.621 DIABETIC ULCER OF LEFT FOOT: ICD-10-CM

## 2022-08-19 DIAGNOSIS — L97.529 DIABETIC ULCER OF LEFT FOOT: ICD-10-CM

## 2022-08-19 DIAGNOSIS — L97.412 DIABETIC ULCER OF RIGHT MIDFOOT ASSOCIATED WITH DIABETES MELLITUS DUE TO UNDERLYING CONDITION, WITH FAT LAYER EXPOSED: Primary | ICD-10-CM

## 2022-08-19 DIAGNOSIS — E08.621 DIABETIC ULCER OF RIGHT MIDFOOT ASSOCIATED WITH DIABETES MELLITUS DUE TO UNDERLYING CONDITION, WITH FAT LAYER EXPOSED: Primary | ICD-10-CM

## 2022-08-19 DIAGNOSIS — L97.512 DIABETIC ULCER OF RIGHT FOOT ASSOCIATED WITH TYPE 2 DIABETES MELLITUS, WITH FAT LAYER EXPOSED: ICD-10-CM

## 2022-08-19 DIAGNOSIS — E11.621 DIABETIC ULCER OF LEFT MIDFOOT ASSOCIATED WITH TYPE 2 DIABETES MELLITUS, WITH FAT LAYER EXPOSED: ICD-10-CM

## 2022-08-19 PROCEDURE — 11042 DBRDMT SUBQ TIS 1ST 20SQCM/<: CPT | Mod: 59,,, | Performed by: PODIATRIST

## 2022-08-19 PROCEDURE — 99214 PR OFFICE/OUTPT VISIT, EST, LEVL IV, 30-39 MIN: ICD-10-PCS | Mod: 25,,, | Performed by: PODIATRIST

## 2022-08-19 PROCEDURE — 11042 WOUND DEBRIDEMENT: ICD-10-PCS | Mod: 59,,, | Performed by: PODIATRIST

## 2022-08-19 PROCEDURE — 99214 OFFICE O/P EST MOD 30 MIN: CPT | Mod: 25,,, | Performed by: PODIATRIST

## 2022-08-19 PROCEDURE — 11042 DBRDMT SUBQ TIS 1ST 20SQCM/<: CPT | Performed by: PODIATRIST

## 2022-08-19 NOTE — PROGRESS NOTES
[facility]                Progress Note    Subjective:       Patient ID: Indio Ryder Jr. is a 52 y.o. male.    Chief Complaint: No chief complaint on file.    Walked to clinic unaided wearing cam boot and Darco. No new pedal complaints      Review of Systems   Constitutional:  Negative for appetite change, chills, fatigue and fever.   HENT:  Negative for hearing loss.    Eyes:  Negative for photophobia and visual disturbance.   Respiratory:  Negative for cough, chest tightness, shortness of breath and wheezing.    Cardiovascular:  Positive for leg swelling. Negative for chest pain and palpitations.   Gastrointestinal:  Negative for constipation, diarrhea, nausea and vomiting.   Endocrine: Negative for cold intolerance and heat intolerance.   Genitourinary:  Negative for flank pain.   Musculoskeletal:  Positive for gait problem and myalgias. Negative for neck pain and neck stiffness.   Skin:  Positive for wound. Negative for color change.   Neurological:  Positive for numbness. Negative for weakness, light-headedness and headaches.        + paresthesia    Psychiatric/Behavioral:  Negative for sleep disturbance.        Objective:        Physical Exam  Vitals reviewed.   Constitutional:       Appearance: He is well-developed.   Cardiovascular:      Comments: dorsalis pedis and posterior tibial pulses are palpable bilaterally. Capillary refill time is within normal limits. + pedal hair growth         Musculoskeletal:         General: No tenderness. Normal range of motion.      Comments: Normal angle, base, station of gait. All toes without clubbing, cyanosis, or signs of ischemia.  No pain to palpation bilateral lower extremities.  Range of motion, stability, muscle strength, and muscle tone normal bilateral feet and legs.    Skin:     General: Skin is warm and dry.      Coloration: Skin is not ashen or cyanotic.      Findings: Wound (see below) present. No rash.       Nails: There is no clubbing.   Neurological:      Mental Status: He is alert and oriented to person, place, and time.      Sensory: No sensory deficit.      Comments: Diminished/loss of protective sensation all toes bilateral to 10 gram monofilament.     Psychiatric:         Behavior: Behavior normal.       Vitals:    08/19/22 1250   BP: (!) 154/90   Pulse: 80   Temp: 97.7 °F (36.5 °C)       Assessment:           ICD-10-CM ICD-9-CM   1. Diabetic ulcer of right midfoot associated with diabetes mellitus due to underlying condition, with fat layer exposed  E08.621 249.80    L97.412 707.14   2. Diabetic ulcer of left midfoot associated with type 2 diabetes mellitus, with fat layer exposed  E11.621 250.80    L97.422 707.14   3. Diabetic ulcer of right foot associated with type 2 diabetes mellitus, with fat layer exposed  E11.621 250.80    L97.512 707.15   4. Diabetic ulcer of left foot  E11.621 250.80    L97.529 707.15            Wound 04/08/22 1137 Diabetic Ulcer Left plantar;medial Foot (Active)   04/08/22 1137    Pre-existing: Yes   Primary Wound Type: Diabetic ulc   Side: Left   Orientation: plantar;medial   Location: Foot   Wound Number:    Ankle-Brachial Index:    Pulses:    Removal Indication and Assessment:    Wound Outcome:    (Retired) Wound Type:    (Retired) Wound Length (cm):    (Retired) Wound Width (cm):    (Retired) Depth (cm):    Wound Description (Comments):    Removal Indications:    Wound Image   08/19/22 1700   Wound WDL ex 08/19/22 1700   Dressing Appearance Dry;Intact 08/19/22 1700   Drainage Amount Moderate 08/19/22 1700   Drainage Characteristics/Odor Serous 08/19/22 1700   Appearance Pink 08/19/22 1700   Tissue loss description Partial thickness 08/19/22 1700   Red (%), Wound Tissue Color 100 % 08/19/22 1700   Periwound Area Dry;Intact 08/19/22 1700   Wound Edges Defined 08/19/22 1700   Wound Length (cm) 1.3 cm 08/19/22 1700   Wound Width (cm) 0.5 cm 08/19/22 1700   Wound Depth (cm) 0.2 cm  08/19/22 1700   Wound Volume (cm^3) 0.13 cm^3 08/19/22 1700   Wound Surface Area (cm^2) 0.65 cm^2 08/19/22 1700   Care Cleansed with:;Antimicrobial agent;Sterile normal saline 08/19/22 1700   Off Loading Football dressing;Off loading shoe 08/19/22 1700   Dressing Change Due 08/23/22 08/19/22 1700            Wound 05/06/22 1142 Diabetic Ulcer Right plantar Foot (Active)   05/06/22 1142    Pre-existing: Yes   Primary Wound Type: Diabetic ulc   Side: Right   Orientation: plantar   Location: Foot   Wound Number:    Ankle-Brachial Index:    Pulses:    Removal Indication and Assessment:    Wound Outcome:    (Retired) Wound Type:    (Retired) Wound Length (cm):    (Retired) Wound Width (cm):    (Retired) Depth (cm):    Wound Description (Comments):    Removal Indications:    Wound Image   08/19/22 1700   Wound WDL ex 08/19/22 1700   Dressing Appearance Dry;Intact 08/19/22 1700   Drainage Amount Moderate 08/19/22 1700   Drainage Characteristics/Odor Brown;No odor 08/19/22 1700   Appearance Pink 08/19/22 1700   Tissue loss description Partial thickness 08/19/22 1700   Red (%), Wound Tissue Color 100 % 08/19/22 1700   Periwound Area Dry 08/19/22 1700   Wound Edges Defined 08/19/22 1700   Wound Length (cm) 1.9 cm 08/19/22 1700   Wound Width (cm) 1.2 cm 08/19/22 1700   Wound Depth (cm) 0.4 cm 08/19/22 1700   Wound Volume (cm^3) 0.912 cm^3 08/19/22 1700   Wound Surface Area (cm^2) 2.28 cm^2 08/19/22 1700   Care Cleansed with:;Antimicrobial agent;Sterile normal saline 08/19/22 1700   Dressing Changed 08/19/22 1700   Compression Two layer compression 08/19/22 1700   Off Loading Football dressing;Off loading shoe 08/19/22 1700   Dressing Change Due 08/23/22 08/19/22 1700           Plan:            I am concerned about the depth and location of the plantar right forefoot wound where he only has 3 central rays/digits remaining. I am of the opinion that he may have OM and best course of action may be TMA, he is resistant to  this.    Wound Debridement    Date/Time: 8/19/2022 10:42 AM  Performed by: Marivel Peterson DPM  Authorized by: Marivel Peterson DPM       Wound Details:    Location:  Left foot    Location:  Left Midfoot    Type of Debridement:  Excisional       Length (cm):  1.2       Area (sq cm):  0.6       Width (cm):  0.5       Percent Debrided (%):  100       Depth (cm):  0.2       Total Area Debrided (sq cm):  0.6    Depth of debridement:  Subcutaneous tissue    Tissue debrided:  Epidermis and Dermis    Devitalized tissue debrided:  Fibrin and Callus    Instruments:  Curette    Bleeding:  Minimal  Hemostasis Achieved: Yes    Method Used:  Pressure  Patient tolerance:  Patient tolerated the procedure well with no immediate complications    Orders Placed This Encounter   Procedures    Debridement     This order was created via procedure documentation     Standing Status:   Standing     Number of Occurrences:   1    X-Ray Foot Complete Right     Weight Bearing     Standing Status:   Future     Number of Occurrences:   1     Standing Expiration Date:   8/19/2023    Change dressing     Clean with NS. Dakin's soak x10 minutes. Right foot: Gentian violet to periwound. Endoform/Iodosorb to wound bed. Mextra short x1. Football dressing (cast padding x3), No Tubigrip in E available Calamine Coflex placed.. Darco. Left plantar foot: Gentian violet to all wet area of plantar foot and periwound. Iodosorb to wound bed, mextra long x1. Football dressing (cast padding x3, stockinet). Cam boot.        Follow up for  nurse visit Tuesday.

## 2022-08-22 NOTE — PROGRESS NOTES
[facility]  Colon and Rectal Surgery    [address]  [place], [place]  Tel: [phone]  Fax: [phone]  [email or web address]   MD Fadi Chapman MD Brian Kann, MD W. Forrest Johnston, MD Matthew Giglia, MD Jennifer Paruch, MD William Kethman, MD Danielle Kay, MD     Patient name: Indio Ryder Jr.   YOB: 1970   MRN: [id number]  Date of visit: 08/23/2022    Dear Navid Thakur and Garland,    It was a pleasure seeing Mr. Ryder in the Colon and Rectal Surgery clinic here at [facility].     As you know, Mr. Ryder is a 51 year old man with a history of DM and HLD who presents for evaluation of colon adenocarcinoma. He underwent a colonoscopy on 1/19/2022 with Dr. Haile and was found to have a sessile hepatic flexure polyp (single tattoo at that time) > referred to Dr. Haque for EMR (3/21/2022 - initial colonoscopy report was mislabeled, addendum made) - removed in piecemeal fashion but was described as complete resection. Of note, he carries a diagnosis of UC but he said that this was made over a decade ago when he was taking NSAIDs frequently - he has not had any issues or been treated for this in over a decade. He is asymptomatic, and denies any family history of CRC (sister had breast cancer). He is being treated for a lower extremity ulcer - last HgbA1c 8.1 9/2021.    Oncology History   Colon adenocarcinoma   3/30/2022 Pathology Significant Finding    COLON, HEPATIC FLEXURE POLYP, EMR:   -Invasive adenocarcinoma, moderately differentiated, arising in a tubular   adenoma.   TUMOR SITE:  Hepatic flexure.   HISTOLOGIC TYPE:  Adenocarcinoma.   HISTOLOGIC GRADE:  G2 moderately differentiated.   SIZE OF INVASIVE TUMOR:  At least 0.3 mm.   TUMOR EXTENT:  Invades the submucosa.   LYMPHOVASCULAR INVASION:  Not identified.   POLYP SIZE:  Greatest dimensions:  1.4 cm, see comment.   MARGINS:  Margins are positive for high grade dysplasia.                         No carcinoma is identified at cauterized margins.   COMMENT:   It is difficult measure the exact size of the invasive tumor and polyp   configuration secondary to the fragmented nature of the specimen and   tangential sectioning.      3/30/2022 Initial Diagnosis    Colon adenocarcinoma     4/11/2022 Imaging Significant Findings    CT CAP  1. In this patient with recent diagnosis of colon cancer, there is no convincing metastatic disease.  Presumed hemostasis clips right colon may be the site of the known colon cancer.  2. 2 mm nodule left upper lobe series 6, image 222.  Attention on follow-up.  For a solid nodule <6 mm, Fleischner Society 2017 guidelines recommend no routine follow up for a low risk patient, or follow-up with non-contrast chest CT at 12 months in a high risk patient.  3. Cholelithiasis without evidence of cholecystitis.  4. Hepatomegaly.  5. Small-sized hiatal hernia with reflux.  6. Borderline enlarged left paratracheal node.     4/11/2022 Tumor Markers    Patient's tumor was tested for the following markers: CEA.                                              Results of the tumor marker test revealed 4     4/25/2022 Surgery    Robotic-assisted right hemicolectomy     4/29/2022 Pathology Significant Finding    RIGHT COLON TERMINAL ILEUM AND APPENDIX, RESECTION:   Focal residual adenoma adjacent to previous biopsy site.   No invasive carcinoma identified.   Margins of resection, negative for dysplasia/carcinoma.   Appendix with fibrous obliteration of lumen.   Seventeen (17) lymph nodes, negative for tumor     4/29/2022 Cancer Staged    Staging form: Colon and Rectum, AJCC 8th Edition  - Pathologic stage from 4/29/2022: Stage I (pT1, pN0, cM0)       5/17/2022  Here today for post-operative follow-up. He is doing well - normal bowel function, no fevers, or chills. No complaints today.    8/23/2022  Here for post-operative follow-up with plan for colonoscopic surveillance in 1/2023.  He is doing well - he is having normal bowel function without blood in his stool and denies any fevers or chills.     The patient was informed of the availability of a certified  without charge. A certified  was not necessary for this visit.    Review of Systems  See pertinent review of systems above    Past Medical History:   Diagnosis Date    Actinomyces infection 01/17/2017    Right foot    Colon adenocarcinoma 04/12/2022    Diabetic ketoacidosis without coma associated with type 2 diabetes mellitus 05/30/2017    Diabetic ulcer of right foot associated with type 2 diabetes mellitus 06/03/2015    Disorder of kidney and ureter     Essential hypertension 06/06/2013    Group B streptococcal infection 01/13/2017    Mixed hyperlipidemia 08/12/2014    Septic arthritis of left foot 04/28/2021    Shoulder impingement 08/12/2014    Subacute osteomyelitis of right foot 01/12/2017    Streptococcus agalactiae, Actinomyces odontolyticus    Traumatic amputation of fifth toe of right foot 07/02/2015    Type 2 diabetes mellitus with diabetic neuropathy, with long-term current use of insulin 05/03/2016     Past Surgical History:   Procedure Laterality Date    COLONOSCOPY Right 1/19/2022    Procedure: COLONOSCOPY;  Surgeon: Tj Haile MD;  Location: [facility] (4TH FLR);  Service: Endoscopy;  Laterality: Right;  previous order un-usable/poor prep 1/18/22  RAPID COVID test arrival 12:20  instructions handed to pt- sm    COLONOSCOPY N/A 3/21/2022    Procedure: COLONOSCOPY;  Surgeon: Sheng Haque MD;  Location: [facility] (2ND FLR);  Service: Endoscopy;  Laterality: N/A;  Colonoscopy with AES for hepatix flexure polyp removal, 2nd floor  Pt is fully vaccinated-DS  Pt prescribed Plavix by Dr. Stahl in Jan. 2022, however pt states that he never started taking it-DS  3/16/22-Instructions sent via portal-DS    DEBRIDEMENT OF FOOT Left 7/14/2019    Procedure: DEBRIDEMENT, FOOT, with left  5th ray amputation;  Surgeon: Sis Hickman DPM;  Location: [facility] OR 2ND FLR;  Service: Podiatry;  Laterality: Left;    DEBRIDEMENT OF FOOT Left 7/17/2019    Procedure: DEBRIDEMENT, FOOT with leftt 5th ray partial amputation with Neox Graft;  Surgeon: Mai Burrell DPM;  Location: [facility] OR 2ND FLR;  Service: Podiatry;  Laterality: Left;  t    DEBRIDEMENT OF FOOT Bilateral 4/29/2021    Procedure: DEBRIDEMENT, FOOT;  Surgeon: Mai Burrell DPM;  Location: [facility] OR 1ST FLR;  Service: Podiatry;  Laterality: Bilateral;  Graft application    TOE AMPUTATION Right 06/05/2015    TOE AMPUTATION Right 01/19/2017    XI ROBOTIC COLECTOMY, RIGHT N/A 4/25/2022    Procedure: XI ROBOTIC COLECTOMY, RIGHT (lithotomy, eras low);  Surgeon: Erik Stout MD;  Location: [facility] OR [facility] FLR;  Service: Colon and Rectal;  Laterality: N/A;  Paruch to Goodwin    XI ROBOTIC COLECTOMY, RIGHT  4/25/2022    Procedure: ;  Surgeon: Erik Stout MD;  Location: [facility] OR 2ND FLR;  Service: Colon and Rectal;;     Family History   Adopted: Yes   Problem Relation Age of Onset    Hypertension Mother     Cancer Mother         breast    Diabetes Mother     Hypertension Father     Cataracts Father     Heart disease Father         mi    Diabetes Sister     No Known Problems Brother     Heart disease Sister         MI    No Known Problems Sister     Hypertension Maternal Aunt     No Known Problems Maternal Uncle     No Known Problems Paternal Aunt     No Known Problems Paternal Uncle     No Known Problems Maternal Grandmother     No Known Problems Maternal Grandfather     No Known Problems Paternal Grandmother     No Known Problems Paternal Grandfather     Amblyopia Neg Hx     Blindness Neg Hx     Glaucoma Neg Hx     Macular degeneration Neg Hx     Retinal detachment Neg Hx     Strabismus Neg Hx     Stroke Neg Hx     Thyroid disease Neg Hx      Social History     Tobacco Use    Smoking status: Never  "Smoker    Smokeless tobacco: Never Used   Substance Use Topics    Alcohol use: No    Drug use: No     Review of patient's allergies indicates:  No Known Allergies    Current Outpatient Medications on File Prior to Visit   Medication Sig Dispense Refill    acetaminophen (TYLENOL) 325 MG tablet Take 2 tablets (650 mg total) by mouth every 6 (six) hours as needed. 30 tablet 0    atorvastatin (LIPITOR) 80 MG tablet Take 1 tablet (80 mg total) by mouth once daily. 90 tablet 3    diphenoxylate-atropine 2.5-0.025 mg (LOMOTIL) 2.5-0.025 mg per tablet Take 1 to 2 tablets by mouth three times daily as needed for diarrhea 40 tablet 0    insulin aspart U-100 (NOVOLOG) 100 unit/mL (3 mL) InPn pen Inject 8 units before meals plus scale 150-200+2, 201-250+4, 251-300+6, 301-350+8, >350+10. Max daily 54 units. 15 mL 6    insulin detemir U-100 (LEVEMIR FLEXTOUCH) 100 unit/mL (3 mL) SubQ InPn pen Inject 26 Units into the skin every evening. 15 mL 6    metFORMIN (GLUCOPHAGE-XR) 500 MG ER 24hr tablet Take 1 to 2 tablets by mouth twice daily 360 tablet 1    minocycline (MINOCIN,DYNACIN) 100 MG capsule Take 1 capsule (100 mg total) by mouth every 12 (twelve) hours. 60 capsule 0    ondansetron (ZOFRAN-ODT) 8 MG TbDL Dissolve 1 tablet (8 mg total) by mouth every 8 (eight) hours as needed (Nausea). 21 tablet 0    ONETOUCH DELICA LANCETS 33 gauge Misc       ONETOUCH ULTRA2 kit       pantoprazole (PROTONIX) 40 MG tablet Take 1 tablet (40 mg total) by mouth once daily. 30 tablet 11    pen needle, diabetic (BD SASCHA 2ND GEN PEN NEEDLE) 32 gauge x 5/32" Ndle Use 4 times a day (Patient taking differently: Use 4 times a day) 400 each 3    permethrin (ELIMITE) 5 % cream Apply neck down at night for 1 application. Wash off in morning and Repeat in 1 week 60 g 1    semaglutide (OZEMPIC) 0.25 mg or 0.5 mg(2 mg/1.5 mL) pen injector Inject 0.25 mg into the skin every 7 days for 30 days, THEN 0.5 mg every 7 days. 3 pen 0    tamsulosin " "(FLOMAX) 0.4 mg Cap Take 2 capsules (0.8 mg total) by mouth once daily. for 14 days 28 capsule 0    [DISCONTINUED] clopidogreL (PLAVIX) 75 mg tablet Take 1 tablet (75 mg total) by mouth once daily. (Patient not taking: No sig reported) 30 tablet 11     Current Facility-Administered Medications on File Prior to Visit   Medication Dose Route Frequency Provider Last Rate Last Admin    heparin, porcine (PF) 100 unit/mL injection flush 10 Units  10 Units Intravenous PRN Esthela Diggs MD   500 Units at 05/19/21 1308     Physical Examination  BP (!) 154/92 (BP Location: Left arm, Patient Position: Sitting, BP Method: Large (Automatic))   Pulse 86   Ht 6' 1" (1.854 m)   Wt 105.7 kg (233 lb)   BMI 30.74 kg/m²      A chaperone was present for the physical examination.    Constitutional: well developed, no cough, no dyspnea, alert, and no acute distress    Head: Normocephalic, no lesions, without obvious abnormality  Eye: Normal external eye, conjunctiva, and lids  Cardiovascular: regular rate and regular rhythm  Respiratory: normal air entry  Gastrointestinal: soft, non-tender, incisions are healed, no evidence of infection or hernia  Musculoskeletal: full range of motion without pain  Neurologic: alert, oriented, normal speech, no focal findings or movement disorder noted  Psychiatric: appropriate, normal mood    Assessment and Plan of Care    Thank you again for referring Mr. Ryder to my care. In summary, Mr. Ryder is a 51 year old man presenting with colon adenocarcinoma arising from sessile polyp removed in piecemeal fashion, margins positive for high-grade dysplasia > underwent right hemicolectomy. We discussed treatment options and have provided the following recommendations:    1. We had a long discussion regarding the pathology and surveillance for colon cancer. For patients with Stage I disease, a colonoscopy should be repeated at 1 year after surgery - if an advanced adenoma is detected then repeat " exam should be performed at 1 year, otherwise repeat in 3 years and then every 5 years.  2. Case request for colonoscopy previously placed - due at 1 year from last (1/2023)  3. Follow-up at next colonoscopy    Please do not hesitate to contact me if you have any questions.      Erik Stout MD - Staff Surgeon  Department of Colon & Rectal Surgery  [facility]

## 2022-08-23 ENCOUNTER — OFFICE VISIT (OUTPATIENT)
Dept: SURGERY | Facility: CLINIC | Age: 52
End: 2022-08-23
Payer: MEDICARE

## 2022-08-23 ENCOUNTER — HOSPITAL ENCOUNTER (OUTPATIENT)
Dept: WOUND CARE | Facility: HOSPITAL | Age: 52
Discharge: HOME | End: 2022-08-23
Attending: FAMILY MEDICINE
Payer: MEDICARE

## 2022-08-23 VITALS
BODY MASS INDEX: 30.88 KG/M2 | WEIGHT: 233 LBS | DIASTOLIC BLOOD PRESSURE: 92 MMHG | SYSTOLIC BLOOD PRESSURE: 154 MMHG | HEART RATE: 86 BPM | HEIGHT: 73 IN

## 2022-08-23 VITALS — TEMPERATURE: 98 F | DIASTOLIC BLOOD PRESSURE: 74 MMHG | HEART RATE: 93 BPM | SYSTOLIC BLOOD PRESSURE: 148 MMHG

## 2022-08-23 DIAGNOSIS — E11.621 DIABETIC ULCER OF LEFT MIDFOOT ASSOCIATED WITH TYPE 2 DIABETES MELLITUS, WITH FAT LAYER EXPOSED: ICD-10-CM

## 2022-08-23 DIAGNOSIS — E08.621 DIABETIC ULCER OF RIGHT MIDFOOT ASSOCIATED WITH DIABETES MELLITUS DUE TO UNDERLYING CONDITION, WITH FAT LAYER EXPOSED: Primary | ICD-10-CM

## 2022-08-23 DIAGNOSIS — C18.9 COLON ADENOCARCINOMA: Primary | ICD-10-CM

## 2022-08-23 DIAGNOSIS — L97.412 DIABETIC ULCER OF RIGHT MIDFOOT ASSOCIATED WITH DIABETES MELLITUS DUE TO UNDERLYING CONDITION, WITH FAT LAYER EXPOSED: Primary | ICD-10-CM

## 2022-08-23 DIAGNOSIS — L97.422 DIABETIC ULCER OF LEFT MIDFOOT ASSOCIATED WITH TYPE 2 DIABETES MELLITUS, WITH FAT LAYER EXPOSED: ICD-10-CM

## 2022-08-23 PROCEDURE — 99214 PR OFFICE/OUTPT VISIT, EST, LEVL IV, 30-39 MIN: ICD-10-PCS | Mod: S$PBB,,, | Performed by: STUDENT IN AN ORGANIZED HEALTH CARE EDUCATION/TRAINING PROGRAM

## 2022-08-23 PROCEDURE — 99214 OFFICE O/P EST MOD 30 MIN: CPT | Mod: PBBFAC,25 | Performed by: STUDENT IN AN ORGANIZED HEALTH CARE EDUCATION/TRAINING PROGRAM

## 2022-08-23 PROCEDURE — 99999 PR PBB SHADOW E&M-EST. PATIENT-LVL IV: ICD-10-PCS | Mod: PBBFAC,,, | Performed by: STUDENT IN AN ORGANIZED HEALTH CARE EDUCATION/TRAINING PROGRAM

## 2022-08-23 PROCEDURE — 29581 APPL MULTLAYER CMPRN SYS LEG: CPT

## 2022-08-23 PROCEDURE — 99999 PR PBB SHADOW E&M-EST. PATIENT-LVL IV: CPT | Mod: PBBFAC,,, | Performed by: STUDENT IN AN ORGANIZED HEALTH CARE EDUCATION/TRAINING PROGRAM

## 2022-08-23 PROCEDURE — 99214 OFFICE O/P EST MOD 30 MIN: CPT | Mod: S$PBB,,, | Performed by: STUDENT IN AN ORGANIZED HEALTH CARE EDUCATION/TRAINING PROGRAM

## 2022-08-23 NOTE — PROGRESS NOTES
[facility] [address] [place]  [address]  Nurse Visit    Subjective:       Patient seen in clinic today.  Dressing changed as ordered. Consulted with Dr. Peterson, verbal order given to not apply Mextra as drainage is small, okay to apply Felt Custom pad to Right Plantar Foot with hole cut to allow drainage. Abram Foam long x 1 to Right Foot and Abram Foam long x 2 to Left Foot. Tubigrip size D, single Layer applied to LLE.       Assessment:          Wound 04/08/22 1137 Diabetic Ulcer Left plantar;medial Foot (Active)   04/08/22 1137    Pre-existing: Yes   Primary Wound Type: Diabetic ulc   Side: Left   Orientation: plantar;medial   Location: Foot   Wound Number:    Ankle-Brachial Index:    Pulses:    Removal Indication and Assessment:    Wound Outcome:    (Retired) Wound Type:    (Retired) Wound Length (cm):    (Retired) Wound Width (cm):    (Retired) Depth (cm):    Wound Description (Comments):    Removal Indications:    Wound WDL ex 08/23/22 1600   Dressing Appearance Moist drainage 08/23/22 1600   Drainage Amount Small 08/23/22 1600   Drainage Characteristics/Odor Other (see comments) 08/23/22 1600   Appearance Red 08/23/22 1600   Black (%), Wound Tissue Color 0 % 08/23/22 1600   Red (%), Wound Tissue Color 100 % 08/23/22 1600   Yellow (%), Wound Tissue Color 0 % 08/23/22 1600   Periwound Area Macerated;Moist;Pale white 08/23/22 1600   Wound Edges Defined;Open 08/23/22 1600   Care Cleansed with:;Antimicrobial agent;Sterile normal saline 08/23/22 1600   Dressing Changed 08/23/22 1600   Periwound Care Moisture barrier applied 08/23/22 1600   Off Loading Football dressing;Off loading shoe 08/23/22 1600   Dressing Change Due 08/26/22 08/23/22 1600            Wound 05/06/22 1142 Diabetic Ulcer Right plantar Foot (Active)   05/06/22 1142    Pre-existing: Yes   Primary Wound Type: Diabetic ulc   Side: Right   Orientation: plantar   Location: Foot   Wound Number:    Ankle-Brachial  Index:    Pulses:    Removal Indication and Assessment:    Wound Outcome:    (Retired) Wound Type:    (Retired) Wound Length (cm):    (Retired) Wound Width (cm):    (Retired) Depth (cm):    Wound Description (Comments):    Removal Indications:    Wound WDL ex 08/23/22 1600   Dressing Appearance Intact;Dried drainage 08/23/22 1600   Drainage Amount Moderate 08/23/22 1600   Drainage Characteristics/Odor Other (see comments) 08/23/22 1600   Appearance Red;Yellow;Slough 08/23/22 1600   Tissue loss description Full thickness 08/23/22 1600   Black (%), Wound Tissue Color 0 % 08/23/22 1600   Red (%), Wound Tissue Color 90 % 08/23/22 1600   Yellow (%), Wound Tissue Color 10 % 08/23/22 1600   Periwound Area Macerated;Moist;Pale white 08/23/22 1600   Wound Edges Defined;Open 08/23/22 1600   Care Cleansed with:;Antimicrobial agent;Sterile normal saline 08/23/22 1600   Dressing Changed 08/23/22 1600   Periwound Care Moisture barrier applied 08/23/22 1600   Compression Two layer compression 08/23/22 1600   Off Loading Football dressing;Off loading shoe 08/23/22 1600   Dressing Change Due 08/25/22 08/23/22 1600           Plan:     No orders of the defined types were placed in this encounter.          Follow up in about 3 days (around 8/26/2022).

## 2022-08-24 ENCOUNTER — HOSPITAL ENCOUNTER (OUTPATIENT)
Dept: RADIOLOGY | Facility: HOSPITAL | Age: 52
Discharge: HOME | End: 2022-08-24
Attending: PODIATRIST
Payer: MEDICARE

## 2022-08-24 DIAGNOSIS — L97.412 DIABETIC ULCER OF RIGHT MIDFOOT ASSOCIATED WITH DIABETES MELLITUS DUE TO UNDERLYING CONDITION, WITH FAT LAYER EXPOSED: ICD-10-CM

## 2022-08-24 DIAGNOSIS — E08.621 DIABETIC ULCER OF RIGHT MIDFOOT ASSOCIATED WITH DIABETES MELLITUS DUE TO UNDERLYING CONDITION, WITH FAT LAYER EXPOSED: ICD-10-CM

## 2022-08-24 PROCEDURE — 73630 X-RAY EXAM OF FOOT: CPT | Mod: TC,RT

## 2022-08-24 PROCEDURE — 73630 X-RAY EXAM OF FOOT: CPT | Mod: 26,RT,, | Performed by: RADIOLOGY

## 2022-08-24 PROCEDURE — 73630 XR FOOT COMPLETE 3 VIEW RIGHT: ICD-10-PCS | Mod: 26,RT,, | Performed by: RADIOLOGY

## 2022-08-26 ENCOUNTER — HOSPITAL ENCOUNTER (OUTPATIENT)
Dept: WOUND CARE | Facility: HOSPITAL | Age: 52
Discharge: HOME | End: 2022-08-26
Attending: PODIATRIST
Payer: MEDICARE

## 2022-08-26 VITALS — SYSTOLIC BLOOD PRESSURE: 169 MMHG | HEART RATE: 80 BPM | TEMPERATURE: 98 F | DIASTOLIC BLOOD PRESSURE: 90 MMHG

## 2022-08-26 DIAGNOSIS — E11.621 DIABETIC ULCER OF LEFT MIDFOOT ASSOCIATED WITH TYPE 2 DIABETES MELLITUS, WITH FAT LAYER EXPOSED: ICD-10-CM

## 2022-08-26 DIAGNOSIS — L97.512 DIABETIC ULCER OF RIGHT FOOT ASSOCIATED WITH TYPE 2 DIABETES MELLITUS, WITH FAT LAYER EXPOSED: ICD-10-CM

## 2022-08-26 DIAGNOSIS — L97.422 DIABETIC ULCER OF LEFT MIDFOOT ASSOCIATED WITH TYPE 2 DIABETES MELLITUS, WITH FAT LAYER EXPOSED: ICD-10-CM

## 2022-08-26 DIAGNOSIS — L97.529 DIABETIC ULCER OF LEFT FOOT: ICD-10-CM

## 2022-08-26 DIAGNOSIS — E08.621 DIABETIC ULCER OF RIGHT MIDFOOT ASSOCIATED WITH DIABETES MELLITUS DUE TO UNDERLYING CONDITION, WITH FAT LAYER EXPOSED: Primary | ICD-10-CM

## 2022-08-26 DIAGNOSIS — E11.621 DIABETIC ULCER OF RIGHT FOOT ASSOCIATED WITH TYPE 2 DIABETES MELLITUS, WITH FAT LAYER EXPOSED: ICD-10-CM

## 2022-08-26 DIAGNOSIS — L97.412 DIABETIC ULCER OF RIGHT MIDFOOT ASSOCIATED WITH DIABETES MELLITUS DUE TO UNDERLYING CONDITION, WITH FAT LAYER EXPOSED: Primary | ICD-10-CM

## 2022-08-26 DIAGNOSIS — E11.621 DIABETIC ULCER OF LEFT FOOT: ICD-10-CM

## 2022-08-26 PROCEDURE — 99214 PR OFFICE/OUTPT VISIT, EST, LEVL IV, 30-39 MIN: ICD-10-PCS | Mod: 25,,, | Performed by: PODIATRIST

## 2022-08-26 PROCEDURE — 15275 PR SKIN SUB GRAFT FACE/NK/HF/G UP TO 100 SQCM: ICD-10-PCS | Mod: ,,, | Performed by: PODIATRIST

## 2022-08-26 PROCEDURE — 99214 OFFICE O/P EST MOD 30 MIN: CPT | Mod: 25,,, | Performed by: PODIATRIST

## 2022-08-26 PROCEDURE — 15271 SKIN SUB GRAFT TRNK/ARM/LEG: CPT

## 2022-08-26 PROCEDURE — 15275 SKIN SUB GRAFT FACE/NK/HF/G: CPT | Mod: ,,, | Performed by: PODIATRIST

## 2022-08-26 NOTE — PROGRESS NOTES
[facility]                Progress Note    Subjective:       Patient ID: Indio Ryder Jr. is a 52 y.o. male.    Chief Complaint: Wound Check    Walked to clinic unaided wearing CAM boot L Darco to R foot. He relates attempted compliance with all instructions.  No new pedal complaints      Review of Systems   Constitutional:  Negative for appetite change, chills, fatigue and fever.   HENT:  Negative for hearing loss.    Eyes:  Negative for photophobia and visual disturbance.   Respiratory:  Negative for cough, chest tightness, shortness of breath and wheezing.    Cardiovascular:  Positive for leg swelling. Negative for chest pain and palpitations.   Gastrointestinal:  Negative for constipation, diarrhea, nausea and vomiting.   Endocrine: Negative for cold intolerance and heat intolerance.   Genitourinary:  Negative for flank pain.   Musculoskeletal:  Positive for gait problem and myalgias. Negative for neck pain and neck stiffness.   Skin:  Positive for wound. Negative for color change.   Neurological:  Positive for numbness. Negative for weakness, light-headedness and headaches.        + paresthesia    Psychiatric/Behavioral:  Negative for sleep disturbance.        Objective:        Physical Exam  Vitals reviewed.   Constitutional:       Appearance: He is well-developed.   Cardiovascular:      Comments: dorsalis pedis and posterior tibial pulses are palpable bilaterally. Capillary refill time is within normal limits. + pedal hair growth         Musculoskeletal:         General: No tenderness. Normal range of motion.      Comments: Normal angle, base, station of gait. All toes without clubbing, cyanosis, or signs of ischemia.  No pain to palpation bilateral lower extremities.  Range of motion, stability, muscle strength, and muscle tone normal bilateral feet and legs.    Skin:     General: Skin is warm and dry.      Coloration: Skin is not ashen or cyanotic.       Findings: Wound (see below) present. No rash.      Nails: There is no clubbing.   Neurological:      Mental Status: He is alert and oriented to person, place, and time.      Sensory: No sensory deficit.      Comments: Diminished/loss of protective sensation all toes bilateral to 10 gram monofilament.     Psychiatric:         Behavior: Behavior normal.       Vitals:    08/26/22 1106   BP: (!) 169/90   Pulse: 80   Temp: 97.8 °F (36.6 °C)       Assessment:           ICD-10-CM ICD-9-CM   1. Diabetic ulcer of right midfoot associated with diabetes mellitus due to underlying condition, with fat layer exposed  E08.621 249.80    L97.412 707.14   2. Diabetic ulcer of left midfoot associated with type 2 diabetes mellitus, with fat layer exposed  E11.621 250.80    L97.422 707.14   3. Diabetic ulcer of right foot associated with type 2 diabetes mellitus, with fat layer exposed  E11.621 250.80    L97.512 707.15   4. Diabetic ulcer of left foot  E11.621 250.80    L97.529 707.15            Wound 04/08/22 1137 Diabetic Ulcer Left plantar;medial Foot (Active)   04/08/22 1137    Pre-existing: Yes   Primary Wound Type: Diabetic ulc   Side: Left   Orientation: plantar;medial   Location: Foot   Wound Number:    Ankle-Brachial Index:    Pulses:    Removal Indication and Assessment:    Wound Outcome:    (Retired) Wound Type:    (Retired) Wound Length (cm):    (Retired) Wound Width (cm):    (Retired) Depth (cm):    Wound Description (Comments):    Removal Indications:    Wound WDL ex 08/26/22 1300   Dressing Appearance Dry;Intact 08/26/22 1300   Drainage Amount None 08/26/22 1300   Appearance Intact;Maroon 08/26/22 1300   Tissue loss description Partial thickness 08/26/22 1300   Red (%), Wound Tissue Color 100 % 08/26/22 1300   Periwound Area Dry 08/26/22 1300   Wound Edges Defined 08/26/22 1300   Wound Length (cm) 1.1 cm 08/26/22 1300   Wound Width (cm) 1.1 cm 08/26/22 1300   Wound Depth (cm) 0.2 cm 08/26/22 1300   Wound Volume (cm^3)  0.242 cm^3 08/26/22 1300   Wound Surface Area (cm^2) 1.21 cm^2 08/26/22 1300   Care Cleansed with:;Antimicrobial agent;Sterile normal saline 08/26/22 1300   Dressing Changed 08/26/22 1300   Compression Four layer compression 08/26/22 1300   Off Loading Off loading shoe 08/26/22 1300   Dressing Change Due 09/02/22 08/26/22 1300            Wound 05/06/22 1142 Diabetic Ulcer Right plantar Foot (Active)   05/06/22 1142    Pre-existing: Yes   Primary Wound Type: Diabetic ulc   Side: Right   Orientation: plantar   Location: Foot   Wound Number:    Ankle-Brachial Index:    Pulses:    Removal Indication and Assessment:    Wound Outcome:    (Retired) Wound Type:    (Retired) Wound Length (cm):    (Retired) Wound Width (cm):    (Retired) Depth (cm):    Wound Description (Comments):    Removal Indications:    Wound Image   08/26/22 1100   Wound WDL ex 08/26/22 1100   Dressing Appearance Dry;Intact 08/26/22 1100   Drainage Amount Scant 08/26/22 1100   Appearance Red 08/26/22 1100   Tissue loss description Partial thickness 08/26/22 1100   Red (%), Wound Tissue Color 100 % 08/26/22 1100   Periwound Area Dry 08/26/22 1100   Wound Edges Defined 08/26/22 1100   Wound Length (cm) 1.3 cm 08/26/22 1100   Wound Width (cm) 1.4 cm 08/26/22 1100   Wound Depth (cm) 0.2 cm 08/26/22 1100   Wound Volume (cm^3) 0.364 cm^3 08/26/22 1100   Wound Surface Area (cm^2) 1.82 cm^2 08/26/22 1100   Care Cleansed with:;Antimicrobial agent;Sterile normal saline 08/26/22 1100   Dressing Changed 08/26/22 1100   Compression Two layer compression 08/26/22 1100   Off Loading Football dressing 08/26/22 1100   Dressing Change Due 09/02/22 08/26/22 1100           Plan:            Again, I am concerned about the depth and location of the plantar right forefoot wound where he only has 3 central rays/digits remaining. I am of the opinion that he may have OM and best course of action may be TMA, he is resistant to this. MRI ordered to confirm OM diagnosis to the left  foot.  Discussed possible research opportunity if found to have OM      NEOX OP REPORT    SURGEON: Marivel Peterson DPM  ASSITANT: None  PRE-OP DX: Ulceration secondary to diabetes mellitus and peripheral neuropathy with sluggish response to prior treatment.  POST-OP DX: same.  ANESTHESIA: Pt. has loss of sensation and therefore no anesthesia was required.  HEMOSTASIS: pressure  EBL: less than 5cc.    Time Out performed to verify patient and site and consent obtained.    Procedure: The patient was seen in the wound clinic room and placed in supine position on the treatment table.  Attention was directed to the ulcer which was located on the left foot, where an ulceration measuring  1.1x1.1x0.2 cm is noted.  The ulceration was covered with fibrin and a mild callused rim with periwound maceration.  No acute signs of infection were noted.  The wound is nontender.  Utilizing a 3 mm curette, I debrided 100% of the wound full-thickness through subcutaneous tissue to excise viable and nonviable tissue outside the wound margins.  Patient tolerated well.  The wound was flushed with sterile saline.  There was minimal bleeding with debridement which was well controlled with direct pressure.  A 2x3 cm NEOX sheet was then inspected and noted to be in acceptable.  It was then removed sterilely .  The sheet was then fenestrated with the scalpel and then shaped to the wound.  I utilized the entirety with overlapping edges against the wound.  The NEOX was adhered down with saline soaked cotton swabs and then secured in place 3-0 prolene and covered with adaptic touch non-adherent layer.  The patient having tolerated the procedure well left the clinic with all vital signs stable and vascular status intact to all digits of both feet.     Short-term goals including promoting granulation and epithelialization of the wound. Long term goals include maintaining a healed wound with appropriate offloading and good medical management per  internist.          Orders Placed This Encounter   Procedures    MRI Foot (Forefoot) Left W W/O Contrast     Standing Status:   Future     Standing Expiration Date:   8/26/2023     Order Specific Question:   Does the patient have a pacemaker or a defibrilator (Note: Some facilities may not be able to schedule an MRI for patients with pacemakers and defibrillators. You should contact your local radiology department to determine if this is the case.)?     Answer:   No     Order Specific Question:   Does the patient have an aneurysm or surgical clip, pump, nerve/brain stimulator, middle/inner ear prosthesis, or other metal implant or foreign object (bullet, shrapnel)? If they have a card related to their implant, ask them to bring it. Issues related to the implant may cause the MRI to be delayed.     Answer:   No     Order Specific Question:   Is the patient claustrophobic?     Answer:   No     Order Specific Question:   Will the patient require sedation?     Answer:   No     Order Specific Question:   Does the patient have any of the following conditions? Diabetes, History of Renal Disease or Hypertension requiring medical therapy?     Answer:   Yes     Order Specific Question:   May the Radiologist modify the order per protocol to meet the clinical needs of the patient?     Answer:   Yes     Order Specific Question:   Is this part of a Research Study?     Answer:   No     Order Specific Question:   Does the patient have on a skin patch for medication with aluminized backing?     Answer:   No    Change dressing     Adaptic touch Aquacel Extra, steristrips.  Felt pad right foot with drainage slit   Two Abram foams in perpendicular fashion with footballs  R foot calamine coflex Left leg Tubigrip in size D        Follow up in about 1 week (around 9/2/2022) for nurse visit Tuesday.

## 2022-08-30 ENCOUNTER — HOSPITAL ENCOUNTER (OUTPATIENT)
Dept: WOUND CARE | Facility: HOSPITAL | Age: 52
Discharge: HOME | End: 2022-08-30
Attending: PODIATRIST
Payer: MEDICARE

## 2022-08-30 VITALS
DIASTOLIC BLOOD PRESSURE: 75 MMHG | TEMPERATURE: 98 F | RESPIRATION RATE: 20 BRPM | HEART RATE: 97 BPM | SYSTOLIC BLOOD PRESSURE: 121 MMHG

## 2022-08-30 DIAGNOSIS — E11.621 DIABETIC ULCER OF LEFT MIDFOOT ASSOCIATED WITH TYPE 2 DIABETES MELLITUS, WITH FAT LAYER EXPOSED: ICD-10-CM

## 2022-08-30 DIAGNOSIS — E08.621 DIABETIC ULCER OF RIGHT MIDFOOT ASSOCIATED WITH DIABETES MELLITUS DUE TO UNDERLYING CONDITION, WITH FAT LAYER EXPOSED: Primary | ICD-10-CM

## 2022-08-30 DIAGNOSIS — L97.422 DIABETIC ULCER OF LEFT MIDFOOT ASSOCIATED WITH TYPE 2 DIABETES MELLITUS, WITH FAT LAYER EXPOSED: ICD-10-CM

## 2022-08-30 DIAGNOSIS — L97.412 DIABETIC ULCER OF RIGHT MIDFOOT ASSOCIATED WITH DIABETES MELLITUS DUE TO UNDERLYING CONDITION, WITH FAT LAYER EXPOSED: Primary | ICD-10-CM

## 2022-08-30 PROCEDURE — 29581 APPL MULTLAYER CMPRN SYS LEG: CPT

## 2022-08-30 NOTE — PROGRESS NOTES
[facility] [address]  [id number]  Nurse Visit    Subjective:       Patient seen in clinic today. Wound dressing to BLE moist and dirty to plantar foot. Patient states he is unaware of stepping in water. MD Peterson notified. Dressing changed as ordered. Placed [place] to both wound beds. Abram foam long x1 to right foot, football dressing, coflex calamine. Abram foam long x2 to left foot, football dressing (cast padding x3), tubigrip size E. Patient instructed on importance of keeping wound dry and BLE elevated as much as possible, patient verbalized understanding.       Assessment:          Wound 04/08/22 1137 Diabetic Ulcer Left plantar;medial Foot (Active)   04/08/22 1137    Pre-existing: Yes   Primary Wound Type: Diabetic ulc   Side: Left   Orientation: plantar;medial   Location: Foot   Wound Number:    Ankle-Brachial Index:    Pulses:    Removal Indication and Assessment:    Wound Outcome:    (Retired) Wound Type:    (Retired) Wound Length (cm):    (Retired) Wound Width (cm):    (Retired) Depth (cm):    Wound Description (Comments):    Removal Indications:    Wound WDL ex 08/30/22 1600   Dressing Appearance Intact;Moist drainage;other (see comments) 08/30/22 1600   Drainage Amount Moderate 08/30/22 1600   Drainage Characteristics/Odor Serous;Other (see comments) 08/30/22 1600   Periwound Area Macerated 08/30/22 1600   Wound Edges Defined 08/30/22 1600   Care Cleansed with:;Soap and water;Sterile normal saline 08/30/22 1600   Dressing Changed 08/30/22 1600   Periwound Care Moisture barrier applied 08/30/22 1600   Compression Tubular elasticized bandage 08/30/22 1600   Off Loading Football dressing;Off loading shoe 08/30/22 1600   Dressing Change Due 09/02/22 08/30/22 1600            Wound 05/06/22 1142 Diabetic Ulcer Right plantar Foot (Active)   05/06/22 1142    Pre-existing: Yes   Primary Wound Type: Diabetic ulc   Side: Right   Orientation: plantar   Location: Foot    Wound Number:    Ankle-Brachial Index:    Pulses:    Removal Indication and Assessment:    Wound Outcome:    (Retired) Wound Type:    (Retired) Wound Length (cm):    (Retired) Wound Width (cm):    (Retired) Depth (cm):    Wound Description (Comments):    Removal Indications:    Wound WDL ex 08/30/22 1600   Dressing Appearance Intact;Moist drainage;other (see comments) 08/30/22 1600   Drainage Amount Large 08/30/22 1600   Drainage Characteristics/Odor Serous 08/30/22 1600   Periwound Area Macerated;Pale white 08/30/22 1600   Wound Edges Defined 08/30/22 1600   Care Cleansed with:;Soap and water;Sterile normal saline 08/30/22 1600   Dressing Changed 08/30/22 1600   Periwound Care Moisture barrier applied 08/30/22 1600   Compression Two layer compression 08/30/22 1600   Off Loading Football dressing;Off loading shoe 08/30/22 1600   Dressing Change Due 09/02/22 08/30/22 1600           Plan:     No orders of the defined types were placed in this encounter.          Follow up in about 3 days (around 9/2/2022) for wound care visit.

## 2022-09-02 ENCOUNTER — HOSPITAL ENCOUNTER (OUTPATIENT)
Dept: WOUND CARE | Facility: HOSPITAL | Age: 52
Discharge: HOME | End: 2022-09-02
Attending: PODIATRIST
Payer: MEDICARE

## 2022-09-02 DIAGNOSIS — E08.621 DIABETIC ULCER OF RIGHT MIDFOOT ASSOCIATED WITH DIABETES MELLITUS DUE TO UNDERLYING CONDITION, WITH FAT LAYER EXPOSED: Primary | ICD-10-CM

## 2022-09-02 DIAGNOSIS — L97.422 DIABETIC ULCER OF LEFT MIDFOOT ASSOCIATED WITH TYPE 2 DIABETES MELLITUS, WITH FAT LAYER EXPOSED: ICD-10-CM

## 2022-09-02 DIAGNOSIS — L97.412 DIABETIC ULCER OF RIGHT MIDFOOT ASSOCIATED WITH DIABETES MELLITUS DUE TO UNDERLYING CONDITION, WITH FAT LAYER EXPOSED: Primary | ICD-10-CM

## 2022-09-02 DIAGNOSIS — E11.621 DIABETIC ULCER OF LEFT MIDFOOT ASSOCIATED WITH TYPE 2 DIABETES MELLITUS, WITH FAT LAYER EXPOSED: ICD-10-CM

## 2022-09-02 PROCEDURE — 99214 PR OFFICE/OUTPT VISIT, EST, LEVL IV, 30-39 MIN: ICD-10-PCS | Mod: ,,, | Performed by: PODIATRIST

## 2022-09-02 PROCEDURE — 29581 APPL MULTLAYER CMPRN SYS LEG: CPT

## 2022-09-02 PROCEDURE — 99214 OFFICE O/P EST MOD 30 MIN: CPT | Mod: ,,, | Performed by: PODIATRIST

## 2022-09-02 NOTE — PROGRESS NOTES
[facility]                Progress Note    Subjective:       Patient ID: Indio Ryder Jr. is a 52 y.o. male.    Chief Complaint: Wound Check    F/u wound care visit. Patient ambulatory to exam room with no c/o pain or discomfort at present. Wound dressings intact to BLE with moderate amount of drainage and breakthrough drainage also noted.     Review of Systems   Constitutional:  Negative for appetite change, chills, fatigue and fever.   HENT:  Negative for hearing loss.    Eyes:  Negative for photophobia and visual disturbance.   Respiratory:  Negative for cough, chest tightness, shortness of breath and wheezing.    Cardiovascular:  Positive for leg swelling. Negative for chest pain and palpitations.   Gastrointestinal:  Negative for constipation, diarrhea, nausea and vomiting.   Endocrine: Negative for cold intolerance and heat intolerance.   Genitourinary:  Negative for flank pain.   Musculoskeletal:  Positive for gait problem and myalgias. Negative for neck pain and neck stiffness.   Skin:  Positive for wound. Negative for color change.   Neurological:  Positive for numbness. Negative for weakness, light-headedness and headaches.        + paresthesia    Psychiatric/Behavioral:  Negative for sleep disturbance.        Objective:        Physical Exam  Vitals reviewed.   Constitutional:       Appearance: He is well-developed.   Cardiovascular:      Comments: dorsalis pedis and posterior tibial pulses are palpable bilaterally. Capillary refill time is within normal limits. + pedal hair growth         Musculoskeletal:         General: No tenderness. Normal range of motion.      Comments: Normal angle, base, station of gait. All toes without clubbing, cyanosis, or signs of ischemia.  No pain to palpation bilateral lower extremities.  Range of motion, stability, muscle strength, and muscle tone normal bilateral feet and legs.    Skin:     General: Skin is warm and dry.       Coloration: Skin is not ashen or cyanotic.      Findings: Wound (see below) present. No rash.      Nails: There is no clubbing.   Neurological:      Mental Status: He is alert and oriented to person, place, and time.      Sensory: No sensory deficit.      Comments: Diminished/loss of protective sensation all toes bilateral to 10 gram monofilament.     Psychiatric:         Behavior: Behavior normal.       There were no vitals filed for this visit.    Assessment:           ICD-10-CM ICD-9-CM   1. Diabetic ulcer of right midfoot associated with diabetes mellitus due to underlying condition, with fat layer exposed  E08.621 249.80    L97.412 707.14   2. Diabetic ulcer of left midfoot associated with type 2 diabetes mellitus, with fat layer exposed  E11.621 250.80    L97.422 707.14            Wound 04/08/22 1137 Diabetic Ulcer Left plantar;medial Foot (Active)   04/08/22 1137    Pre-existing: Yes   Primary Wound Type: Diabetic ulc   Side: Left   Orientation: plantar;medial   Location: Foot   Wound Number:    Ankle-Brachial Index:    Pulses:    Removal Indication and Assessment:    Wound Outcome:    (Retired) Wound Type:    (Retired) Wound Length (cm):    (Retired) Wound Width (cm):    (Retired) Depth (cm):    Wound Description (Comments):    Removal Indications:    Wound Image   09/02/22 1100   Wound WDL ex 09/02/22 1100   Dressing Appearance Intact;Moist drainage 09/02/22 1100   Drainage Amount Moderate 09/02/22 1100   Drainage Characteristics/Odor Serosanguineous 09/02/22 1100   Appearance Red 09/02/22 1100   Tissue loss description Partial thickness 09/02/22 1100   Black (%), Wound Tissue Color 0 % 09/02/22 1100   Red (%), Wound Tissue Color 100 % 09/02/22 1100   Yellow (%), Wound Tissue Color 0 % 09/02/22 1100   Periwound Area Pale white 09/02/22 1100   Wound Edges Defined 09/02/22 1100   Wound Length (cm) 0.4 cm 09/02/22 1100   Wound Width (cm) 0.7 cm 09/02/22 1100   Wound Depth (cm) 0.1 cm 09/02/22 1100   Wound  Volume (cm^3) 0.028 cm^3 09/02/22 1100   Wound Surface Area (cm^2) 0.28 cm^2 09/02/22 1100   Care Cleansed with:;Soap and water;Sterile normal saline 09/02/22 1100   Dressing Changed 09/02/22 1100   Periwound Care Moisture barrier applied 09/02/22 1100   Compression Tubular elasticized bandage 09/02/22 1100   Off Loading Football dressing;Off loading shoe 09/02/22 1100   Dressing Change Due 09/08/22 09/02/22 1100            Wound 05/06/22 1142 Diabetic Ulcer Right plantar Foot (Active)   05/06/22 1142    Pre-existing: Yes   Primary Wound Type: Diabetic ulc   Side: Right   Orientation: plantar   Location: Foot   Wound Number:    Ankle-Brachial Index:    Pulses:    Removal Indication and Assessment:    Wound Outcome:    (Retired) Wound Type:    (Retired) Wound Length (cm):    (Retired) Wound Width (cm):    (Retired) Depth (cm):    Wound Description (Comments):    Removal Indications:    Wound Image   09/02/22 1100   Wound WDL ex 09/02/22 1100   Dressing Appearance Intact;Moist drainage 09/02/22 1100   Drainage Amount Moderate 09/02/22 1100   Drainage Characteristics/Odor Serosanguineous 09/02/22 1100   Appearance Red 09/02/22 1100   Tissue loss description Full thickness 09/02/22 1100   Black (%), Wound Tissue Color 0 % 09/02/22 1100   Red (%), Wound Tissue Color 100 % 09/02/22 1100   Yellow (%), Wound Tissue Color 0 % 09/02/22 1100   Periwound Area Pale white 09/02/22 1100   Wound Edges Defined 09/02/22 1100   Wound Length (cm) 1.5 cm 09/02/22 1100   Wound Width (cm) 1.1 cm 09/02/22 1100   Wound Depth (cm) 0.4 cm 09/02/22 1100   Wound Volume (cm^3) 0.66 cm^3 09/02/22 1100   Wound Surface Area (cm^2) 1.65 cm^2 09/02/22 1100   Care Cleansed with:;Soap and water;Sterile normal saline 09/02/22 1100   Dressing Changed 09/02/22 1100   Periwound Care Moisture barrier applied 09/02/22 1100   Compression Two layer compression 09/02/22 1100   Off Loading Football dressing;Off loading shoe 09/02/22 1100   Dressing Change Due  09/07/22 09/02/22 1100           Plan:            Wound to bilateral plantar feet improving, measuring smaller. However I am still concerned about the depth to the right plantar forefoot wound.  MRI ordered.    Wound care done per order. Patient will be seen in podiatry clinic with MD Peterson and on 9/21/22 in [facility] for nurse visit and 9/16/22 for MD visit. He plans on being out of town for several days.    Orders Placed This Encounter   Procedures    MRI Foot (Forefoot) Right W W/O Contrast     Standing Status:   Future     Number of Occurrences:   1     Standing Expiration Date:   9/2/2023     Order Specific Question:   Does the patient have a pacemaker or a defibrilator (Note: Some facilities may not be able to schedule an MRI for patients with pacemakers and defibrillators. You should contact your local radiology department to determine if this is the case.)?     Answer:   No     Order Specific Question:   Does the patient have an aneurysm or surgical clip, pump, nerve/brain stimulator, middle/inner ear prosthesis, or other metal implant or foreign object (bullet, shrapnel)? If they have a card related to their implant, ask them to bring it. Issues related to the implant may cause the MRI to be delayed.     Answer:   No     Order Specific Question:   Is the patient claustrophobic?     Answer:   No     Order Specific Question:   Will the patient require sedation?     Answer:   No     Order Specific Question:   Does the patient have any of the following conditions? Diabetes, History of Renal Disease or Hypertension requiring medical therapy?     Answer:   Yes     Order Specific Question:   May the Radiologist modify the order per protocol to meet the clinical needs of the patient?     Answer:   Yes     Order Specific Question:   Is this part of a Research Study?     Answer:   No     Order Specific Question:   Does the patient have on a skin patch for medication with aluminized backing?     Answer:   No    MRI Foot  (Forefoot) Right W W/O Contrast     Standing Status:   Standing     Number of Occurrences:   1     Order Specific Question:   Does the patient have a pacemaker or a defibrilator (Note: Some facilities may not be able to schedule an MRI for patients with pacemakers and defibrillators. You should contact your local radiology department to determine if this is the case.)?     Answer:   No     Order Specific Question:   Does the patient have an aneurysm or surgical clip, pump, nerve/brain stimulator, middle/inner ear prosthesis, or other metal implant or foreign object (bullet, shrapnel)? If they have a card related to their implant, ask them to bring it. Issues related to the implant may cause the MRI to be delayed.     Answer:   No     Order Specific Question:   Is the patient claustrophobic?     Answer:   No     Order Specific Question:   Will the patient require sedation?     Answer:   No     Order Specific Question:   Does the patient have any of the following conditions? Diabetes, History of Renal Disease or Hypertension requiring medical therapy?     Answer:   Yes     Order Specific Question:   May the Radiologist modify the order per protocol to meet the clinical needs of the patient?     Answer:   Yes     Order Specific Question:   Is this part of a Research Study?     Answer:   No     Order Specific Question:   Does the patient have on a skin patch for medication with aluminized backing?     Answer:   No    Change dressing     Clean with NS. Apply [place] to both wound beds. Mextra short x1 (each wound). Abram foam long x1 to right foot, football dressing, coflex calamine. Abram foam long x2 to left foot, football dressing (cast padding x3), tubigrip size E        Follow up in about 5 days (around 9/7/2022) for in podiatry clinic.

## 2022-09-07 ENCOUNTER — OFFICE VISIT (OUTPATIENT)
Dept: PODIATRY | Facility: CLINIC | Age: 52
End: 2022-09-07
Payer: MEDICARE

## 2022-09-07 VITALS — BODY MASS INDEX: 30.88 KG/M2 | HEIGHT: 73 IN | WEIGHT: 233 LBS

## 2022-09-07 DIAGNOSIS — E11.49 TYPE II DIABETES MELLITUS WITH NEUROLOGICAL MANIFESTATIONS: ICD-10-CM

## 2022-09-07 DIAGNOSIS — L97.415 DIABETIC ULCER OF RIGHT MIDFOOT ASSOCIATED WITH TYPE 2 DIABETES MELLITUS, WITH MUSCLE INVOLVEMENT WITHOUT EVIDENCE OF NECROSIS: ICD-10-CM

## 2022-09-07 DIAGNOSIS — E11.621 DIABETIC ULCER OF RIGHT MIDFOOT ASSOCIATED WITH TYPE 2 DIABETES MELLITUS, WITH MUSCLE INVOLVEMENT WITHOUT EVIDENCE OF NECROSIS: ICD-10-CM

## 2022-09-07 DIAGNOSIS — L97.422 DIABETIC ULCER OF LEFT MIDFOOT ASSOCIATED WITH TYPE 2 DIABETES MELLITUS, WITH FAT LAYER EXPOSED: Primary | ICD-10-CM

## 2022-09-07 DIAGNOSIS — E11.621 DIABETIC ULCER OF LEFT MIDFOOT ASSOCIATED WITH TYPE 2 DIABETES MELLITUS, WITH FAT LAYER EXPOSED: Primary | ICD-10-CM

## 2022-09-07 PROCEDURE — 99213 OFFICE O/P EST LOW 20 MIN: CPT | Mod: PBBFAC,PO | Performed by: PODIATRIST

## 2022-09-07 PROCEDURE — 99999 PR PBB SHADOW E&M-EST. PATIENT-LVL III: ICD-10-PCS | Mod: PBBFAC,,, | Performed by: PODIATRIST

## 2022-09-07 PROCEDURE — 99213 OFFICE O/P EST LOW 20 MIN: CPT | Mod: S$PBB,,, | Performed by: PODIATRIST

## 2022-09-07 PROCEDURE — 99213 PR OFFICE/OUTPT VISIT, EST, LEVL III, 20-29 MIN: ICD-10-PCS | Mod: S$PBB,,, | Performed by: PODIATRIST

## 2022-09-07 PROCEDURE — 99999 PR PBB SHADOW E&M-EST. PATIENT-LVL III: CPT | Mod: PBBFAC,,, | Performed by: PODIATRIST

## 2022-09-07 RX ORDER — DOXYCYCLINE HYCLATE 100 MG
100 TABLET ORAL 2 TIMES DAILY
Qty: 20 TABLET | Refills: 0 | Status: SHIPPED | OUTPATIENT
Start: 2022-09-07 | End: 2022-10-14 | Stop reason: ALTCHOICE

## 2022-09-16 ENCOUNTER — HOSPITAL ENCOUNTER (OUTPATIENT)
Dept: WOUND CARE | Facility: HOSPITAL | Age: 52
Discharge: HOME | End: 2022-09-16
Attending: PODIATRIST
Payer: MEDICARE

## 2022-09-16 VITALS — HEART RATE: 89 BPM | SYSTOLIC BLOOD PRESSURE: 187 MMHG | TEMPERATURE: 98 F | DIASTOLIC BLOOD PRESSURE: 104 MMHG

## 2022-09-16 DIAGNOSIS — L97.422 DIABETIC ULCER OF LEFT MIDFOOT ASSOCIATED WITH TYPE 2 DIABETES MELLITUS, WITH FAT LAYER EXPOSED: ICD-10-CM

## 2022-09-16 DIAGNOSIS — E11.621 DIABETIC ULCER OF LEFT MIDFOOT ASSOCIATED WITH TYPE 2 DIABETES MELLITUS, WITH FAT LAYER EXPOSED: ICD-10-CM

## 2022-09-16 DIAGNOSIS — L97.412 DIABETIC ULCER OF RIGHT MIDFOOT ASSOCIATED WITH DIABETES MELLITUS DUE TO UNDERLYING CONDITION, WITH FAT LAYER EXPOSED: Primary | ICD-10-CM

## 2022-09-16 DIAGNOSIS — E08.621 DIABETIC ULCER OF RIGHT MIDFOOT ASSOCIATED WITH DIABETES MELLITUS DUE TO UNDERLYING CONDITION, WITH FAT LAYER EXPOSED: Primary | ICD-10-CM

## 2022-09-16 PROCEDURE — 99499 UNLISTED E&M SERVICE: CPT | Mod: ,,, | Performed by: PODIATRIST

## 2022-09-16 PROCEDURE — 11042 DBRDMT SUBQ TIS 1ST 20SQCM/<: CPT | Performed by: PODIATRIST

## 2022-09-16 PROCEDURE — 99499 NO LOS: ICD-10-PCS | Mod: ,,, | Performed by: PODIATRIST

## 2022-09-16 PROCEDURE — 11042 DBRDMT SUBQ TIS 1ST 20SQCM/<: CPT | Mod: ,,, | Performed by: PODIATRIST

## 2022-09-16 PROCEDURE — 11042 WOUND DEBRIDEMENT: ICD-10-PCS | Mod: ,,, | Performed by: PODIATRIST

## 2022-09-16 NOTE — PROGRESS NOTES
[facility]                Progress Note    Subjective:       Patient ID: Indio Ryder Jr. is a 52 y.o. male.    Chief Complaint: Wound Check    F/u wound care visit. Patient ambulatory to exam room with no difficulty noted. Patient denies pain or discomfort at present. Wound dressing to BLE intact with small amount of drainage noted to left plantar foot wound and moderate amount of drainage noted to right plantar foot wound.     Review of Systems   Constitutional:  Negative for appetite change, chills, fatigue and fever.   HENT:  Negative for hearing loss.    Eyes:  Negative for photophobia and visual disturbance.   Respiratory:  Negative for cough, chest tightness, shortness of breath and wheezing.    Cardiovascular:  Positive for leg swelling. Negative for chest pain and palpitations.   Gastrointestinal:  Negative for constipation, diarrhea, nausea and vomiting.   Endocrine: Negative for cold intolerance and heat intolerance.   Genitourinary:  Negative for flank pain.   Musculoskeletal:  Positive for gait problem and myalgias. Negative for neck pain and neck stiffness.   Skin:  Positive for wound. Negative for color change.   Neurological:  Positive for numbness. Negative for weakness, light-headedness and headaches.        + paresthesia    Psychiatric/Behavioral:  Negative for sleep disturbance.        Objective:        Physical Exam  Vitals reviewed.   Constitutional:       Appearance: He is well-developed.   Cardiovascular:      Comments: dorsalis pedis and posterior tibial pulses are palpable bilaterally. Capillary refill time is within normal limits. + pedal hair growth         Musculoskeletal:         General: No tenderness. Normal range of motion.      Comments: Normal angle, base, station of gait. All toes without clubbing, cyanosis, or signs of ischemia.  No pain to palpation bilateral lower extremities.  Range of motion, stability, muscle strength, and  muscle tone normal bilateral feet and legs.    Skin:     General: Skin is warm and dry.      Coloration: Skin is not ashen or cyanotic.      Findings: Wound (see below) present. No rash.      Nails: There is no clubbing.   Neurological:      Mental Status: He is alert and oriented to person, place, and time.      Sensory: No sensory deficit.      Comments: Diminished/loss of protective sensation all toes bilateral to 10 gram monofilament.     Psychiatric:         Behavior: Behavior normal.       Vitals:    09/16/22 1120   BP: (!) 187/104   Pulse: 89   Temp: 97.7 °F (36.5 °C)       Assessment:           ICD-10-CM ICD-9-CM   1. Diabetic ulcer of right midfoot associated with diabetes mellitus due to underlying condition, with fat layer exposed  E08.621 249.80    L97.412 707.14   2. Diabetic ulcer of left midfoot associated with type 2 diabetes mellitus, with fat layer exposed  E11.621 250.80    L97.422 707.14            Wound 04/08/22 1137 Diabetic Ulcer Left plantar;medial Foot (Active)   04/08/22 1137    Pre-existing: Yes   Primary Wound Type: Diabetic ulc   Side: Left   Orientation: plantar;medial   Location: Foot   Wound Number:    Ankle-Brachial Index:    Pulses:    Removal Indication and Assessment:    Wound Outcome:    (Retired) Wound Type:    (Retired) Wound Length (cm):    (Retired) Wound Width (cm):    (Retired) Depth (cm):    Wound Description (Comments):    Removal Indications:    Wound Image   09/16/22 1100   Wound WDL ex 09/16/22 1100   Dressing Appearance Intact;Moist drainage 09/16/22 1100   Drainage Amount Small 09/16/22 1100   Drainage Characteristics/Odor Serosanguineous 09/16/22 1100   Appearance Red 09/16/22 1100   Tissue loss description Partial thickness 09/16/22 1100   Black (%), Wound Tissue Color 0 % 09/16/22 1100   Red (%), Wound Tissue Color 100 % 09/16/22 1100   Yellow (%), Wound Tissue Color 0 % 09/16/22 1100   Periwound Area Macerated 09/16/22 1100   Wound Edges Defined 09/16/22 1100    Wound Length (cm) 1.1 cm 09/16/22 1100   Wound Width (cm) 0.8 cm 09/16/22 1100   Wound Depth (cm) 0.2 cm 09/16/22 1100   Wound Volume (cm^3) 0.176 cm^3 09/16/22 1100   Wound Surface Area (cm^2) 0.88 cm^2 09/16/22 1100   Care Cleansed with:;Soap and water;Antimicrobial agent;Sterile normal saline 09/16/22 1100   Dressing Changed 09/16/22 1100   Periwound Care Moisture barrier applied;Skin barrier film applied 09/16/22 1100   Compression Two layer compression 09/16/22 1100   Off Loading Football dressing;Off loading shoe 09/16/22 1100   Dressing Change Due 09/14/22 09/16/22 1100            Wound 05/06/22 1142 Diabetic Ulcer Right plantar Foot (Active)   05/06/22 1142    Pre-existing: Yes   Primary Wound Type: Diabetic ulc   Side: Right   Orientation: plantar   Location: Foot   Wound Number:    Ankle-Brachial Index:    Pulses:    Removal Indication and Assessment:    Wound Outcome:    (Retired) Wound Type:    (Retired) Wound Length (cm):    (Retired) Wound Width (cm):    (Retired) Depth (cm):    Wound Description (Comments):    Removal Indications:    Wound Image   09/16/22 1100   Wound WDL ex 09/16/22 1100   Dressing Appearance Intact;Moist drainage 09/16/22 1100   Drainage Amount Moderate 09/16/22 1100   Drainage Characteristics/Odor Serosanguineous;No odor 09/16/22 1100   Appearance Red 09/16/22 1100   Tissue loss description Full thickness 09/16/22 1100   Black (%), Wound Tissue Color 0 % 09/16/22 1100   Red (%), Wound Tissue Color 100 % 09/16/22 1100   Yellow (%), Wound Tissue Color 0 % 09/16/22 1100   Periwound Area Macerated 09/16/22 1100   Wound Edges Defined 09/16/22 1100   Wound Length (cm) 0.3 cm 09/16/22 1100   Wound Width (cm) 0.6 cm 09/16/22 1100   Wound Depth (cm) 0.1 cm 09/16/22 1100   Wound Volume (cm^3) 0.018 cm^3 09/16/22 1100   Wound Surface Area (cm^2) 0.18 cm^2 09/16/22 1100   Care Cleansed with:;Soap and water;Sterile normal saline 09/16/22 1100   Dressing Changed 09/16/22 1100   Periwound Care  Moisture barrier applied;Skin barrier film applied 09/16/22 1100   Compression Two layer compression 09/16/22 1100   Off Loading Football dressing;Off loading shoe 09/16/22 1100   Dressing Change Due 09/21/22 09/16/22 1100           Plan:            Wound Debridement    Date/Time: 9/16/2022 10:40 AM  Performed by: Marivel Peterson DPM  Authorized by: Marivel Peterson DPM     Consent Done?:  Yes (Written)    Preparation: Patient was prepped and draped in usual sterile fashion      Wound Details:    Location:  Right foot    Location:  Right Plantar    Type of Debridement:  Excisional       Length (cm):  0.3       Area (sq cm):  0.18       Width (cm):  0.6       Percent Debrided (%):  100       Depth (cm):  0.1       Total Area Debrided (sq cm):  0.18    Depth of debridement:  Subcutaneous tissue    Tissue debrided:  Epidermis, Dermis and Subcutaneous    Devitalized tissue debrided:  Callus and Fibrin    Instruments:  Curette    Bleeding:  Minimal  Hemostasis Achieved: Yes    Method Used:  Pressure  Patient tolerance:  Patient tolerated the procedure well with no immediate complications    Wounds are measuring smaller in all dimensions, no redness or edema noted to BLE. Wound care done per order. RTC on Wednesday 9/21/22 for nurse visit and on Friday 9/23/22 for MD visit.    Orders Placed This Encounter   Procedures    Change dressing     BILATERAL FEET: Clean with NS. Vashe soak x10 mins. Gentian violet to periwound. Cavilon to plantar foot. Custom felt pad with small whole over wound to allow for drainage. [place] to wound bed. Mextra short x1, tim foam long x2. Football dressing (cast padding x3). Calamine Coflex. Change at nurse visit.        Follow up in about 5 days (around 9/21/2022) for nurse visit.

## 2022-09-21 ENCOUNTER — HOSPITAL ENCOUNTER (OUTPATIENT)
Dept: WOUND CARE | Facility: HOSPITAL | Age: 52
Discharge: HOME | End: 2022-09-21
Attending: FAMILY MEDICINE
Payer: MEDICARE

## 2022-09-21 VITALS — HEART RATE: 85 BPM | SYSTOLIC BLOOD PRESSURE: 170 MMHG | DIASTOLIC BLOOD PRESSURE: 89 MMHG | TEMPERATURE: 98 F

## 2022-09-21 DIAGNOSIS — L97.412 DIABETIC ULCER OF RIGHT MIDFOOT ASSOCIATED WITH DIABETES MELLITUS DUE TO UNDERLYING CONDITION, WITH FAT LAYER EXPOSED: Primary | ICD-10-CM

## 2022-09-21 DIAGNOSIS — L97.422 DIABETIC ULCER OF LEFT MIDFOOT ASSOCIATED WITH TYPE 2 DIABETES MELLITUS, WITH FAT LAYER EXPOSED: ICD-10-CM

## 2022-09-21 DIAGNOSIS — E08.621 DIABETIC ULCER OF RIGHT MIDFOOT ASSOCIATED WITH DIABETES MELLITUS DUE TO UNDERLYING CONDITION, WITH FAT LAYER EXPOSED: Primary | ICD-10-CM

## 2022-09-21 DIAGNOSIS — E11.621 DIABETIC ULCER OF LEFT MIDFOOT ASSOCIATED WITH TYPE 2 DIABETES MELLITUS, WITH FAT LAYER EXPOSED: ICD-10-CM

## 2022-09-21 PROCEDURE — 29581 APPL MULTLAYER CMPRN SYS LEG: CPT | Mod: 50

## 2022-09-21 NOTE — PROGRESS NOTES
[facility] [address] [place]  [address]  Nurse Visit    Subjective:       Patient seen in clinic today.  Dressing changed as ordered.      Assessment:          Wound 04/08/22 1137 Diabetic Ulcer Left plantar;medial Foot (Active)   04/08/22 1137    Pre-existing: Yes   Primary Wound Type: Diabetic ulc   Side: Left   Orientation: plantar;medial   Location: Foot   Wound Number:    Ankle-Brachial Index:    Pulses:    Removal Indication and Assessment:    Wound Outcome:    (Retired) Wound Type:    (Retired) Wound Length (cm):    (Retired) Wound Width (cm):    (Retired) Depth (cm):    Wound Description (Comments):    Removal Indications:    Wound WDL ex 09/21/22 1100   Dressing Appearance Intact;Moist drainage 09/21/22 1100   Drainage Amount Small 09/21/22 1100   Drainage Characteristics/Odor Serous 09/21/22 1100   Appearance Red;Moist 09/21/22 1100   Tissue loss description Partial thickness 09/21/22 1100   Black (%), Wound Tissue Color 0 % 09/21/22 1100   Red (%), Wound Tissue Color 100 % 09/21/22 1100   Yellow (%), Wound Tissue Color 0 % 09/21/22 1100   Periwound Area Macerated;Pale white 09/21/22 1100   Wound Edges Defined;Open 09/21/22 1100   Care Cleansed with:;Antimicrobial agent;Sterile normal saline 09/21/22 1100   Dressing Changed 09/21/22 1100   Periwound Care Moisture barrier applied 09/21/22 1100   Compression Two layer compression 09/21/22 1100   Off Loading Football dressing;Off loading shoe 09/21/22 1100   Dressing Change Due 09/23/22 09/21/22 1100            Wound 05/06/22 1142 Diabetic Ulcer Right plantar Foot (Active)   05/06/22 1142    Pre-existing: Yes   Primary Wound Type: Diabetic ulc   Side: Right   Orientation: plantar   Location: Foot   Wound Number:    Ankle-Brachial Index:    Pulses:    Removal Indication and Assessment:    Wound Outcome:    (Retired) Wound Type:    (Retired) Wound Length (cm):    (Retired) Wound Width (cm):    (Retired) Depth (cm):    Wound  Description (Comments):    Removal Indications:    Wound WDL ex 09/21/22 1100   Dressing Appearance Intact;Moist drainage 09/21/22 1100   Drainage Amount Moderate 09/21/22 1100   Drainage Characteristics/Odor Serous 09/21/22 1100   Appearance Red;Moist 09/21/22 1100   Tissue loss description Partial thickness 09/21/22 1100   Black (%), Wound Tissue Color 0 % 09/21/22 1100   Red (%), Wound Tissue Color 100 % 09/21/22 1100   Yellow (%), Wound Tissue Color 0 % 09/21/22 1100   Periwound Area Macerated;Moist;Pale white 09/21/22 1100   Wound Edges Defined;Open 09/21/22 1100   Care Cleansed with:;Antimicrobial agent;Sterile normal saline 09/21/22 1100   Dressing Changed 09/21/22 1100   Periwound Care Moisture barrier applied 09/21/22 1100   Compression Two layer compression 09/21/22 1100   Off Loading Football dressing;Off loading shoe 09/21/22 1100   Dressing Change Due 09/23/22 09/21/22 1100           Plan:     No orders of the defined types were placed in this encounter.          Follow up in about 2 days (around 9/23/2022) for wound care.

## 2022-09-23 ENCOUNTER — HOSPITAL ENCOUNTER (OUTPATIENT)
Dept: WOUND CARE | Facility: HOSPITAL | Age: 52
Discharge: HOME | End: 2022-09-23
Attending: PODIATRIST
Payer: MEDICARE

## 2022-09-23 VITALS — SYSTOLIC BLOOD PRESSURE: 180 MMHG | HEART RATE: 80 BPM | TEMPERATURE: 98 F | DIASTOLIC BLOOD PRESSURE: 100 MMHG

## 2022-09-23 DIAGNOSIS — E11.621 DIABETIC ULCER OF RIGHT FOOT ASSOCIATED WITH TYPE 2 DIABETES MELLITUS, WITH FAT LAYER EXPOSED: Primary | ICD-10-CM

## 2022-09-23 DIAGNOSIS — L97.529 DIABETIC ULCER OF LEFT FOOT: ICD-10-CM

## 2022-09-23 DIAGNOSIS — E11.621 DIABETIC ULCER OF LEFT FOOT: ICD-10-CM

## 2022-09-23 DIAGNOSIS — L97.512 DIABETIC ULCER OF RIGHT FOOT ASSOCIATED WITH TYPE 2 DIABETES MELLITUS, WITH FAT LAYER EXPOSED: Primary | ICD-10-CM

## 2022-09-23 PROCEDURE — 11042 DBRDMT SUBQ TIS 1ST 20SQCM/<: CPT | Performed by: PODIATRIST

## 2022-09-23 PROCEDURE — 99499 NO LOS: ICD-10-PCS | Mod: ,,, | Performed by: PODIATRIST

## 2022-09-23 PROCEDURE — 11042 DEBRIDEMENT: ICD-10-PCS | Mod: ,,, | Performed by: PODIATRIST

## 2022-09-23 PROCEDURE — 29581 APPL MULTLAYER CMPRN SYS LEG: CPT | Performed by: PODIATRIST

## 2022-09-23 PROCEDURE — 99499 UNLISTED E&M SERVICE: CPT | Mod: ,,, | Performed by: PODIATRIST

## 2022-09-23 PROCEDURE — 11042 DBRDMT SUBQ TIS 1ST 20SQCM/<: CPT | Mod: ,,, | Performed by: PODIATRIST

## 2022-09-23 NOTE — PROGRESS NOTES
[facility]                Progress Note    Subjective:       Patient ID: Indio Ryder Jr. is a 52 y.o. male.    Chief Complaint: No chief complaint on file.      Walked to clinic unaided wearing boot.Calm mood only /100 after multiple checks states ate sausage this am for breakfast aware must cut out salt as BP consistently high.     Review of Systems   Constitutional:  Negative for appetite change, chills, fatigue and fever.   HENT:  Negative for hearing loss.    Eyes:  Negative for photophobia and visual disturbance.   Respiratory:  Negative for cough, chest tightness, shortness of breath and wheezing.    Cardiovascular:  Positive for leg swelling. Negative for chest pain and palpitations.   Gastrointestinal:  Negative for constipation, diarrhea, nausea and vomiting.   Endocrine: Negative for cold intolerance and heat intolerance.   Genitourinary:  Negative for flank pain.   Musculoskeletal:  Positive for gait problem and myalgias. Negative for neck pain and neck stiffness.   Skin:  Positive for wound. Negative for color change.   Neurological:  Positive for numbness. Negative for weakness, light-headedness and headaches.        + paresthesia    Psychiatric/Behavioral:  Negative for sleep disturbance.        Objective:        Physical Exam  Vitals reviewed.   Constitutional:       Appearance: He is well-developed.   Cardiovascular:      Comments: dorsalis pedis and posterior tibial pulses are palpable bilaterally. Capillary refill time is within normal limits. + pedal hair growth         Musculoskeletal:         General: No tenderness. Normal range of motion.      Comments: Normal angle, base, station of gait. All toes without clubbing, cyanosis, or signs of ischemia.  No pain to palpation bilateral lower extremities.  Range of motion, stability, muscle strength, and muscle tone normal bilateral feet and legs.    Skin:     General: Skin is warm and dry.       Coloration: Skin is not ashen or cyanotic.      Findings: Wound (see below) present. No rash.      Nails: There is no clubbing.   Neurological:      Mental Status: He is alert and oriented to person, place, and time.      Sensory: No sensory deficit.      Comments: Diminished/loss of protective sensation all toes bilateral to 10 gram monofilament.     Psychiatric:         Behavior: Behavior normal.       Vitals:    09/23/22 0939   BP: (!) 180/100   Pulse: 80   Temp: 97.7 °F (36.5 °C)       Assessment:           ICD-10-CM ICD-9-CM   1. Diabetic ulcer of right foot associated with type 2 diabetes mellitus, with fat layer exposed  E11.621 250.80    L97.512 707.15   2. Diabetic ulcer of left foot  E11.621 250.80    L97.529 707.15            Wound 04/08/22 1137 Diabetic Ulcer Left plantar;medial Foot (Active)   04/08/22 1137    Pre-existing: Yes   Primary Wound Type: Diabetic ulc   Side: Left   Orientation: plantar;medial   Location: Foot   Wound Number:    Ankle-Brachial Index:    Pulses:    Removal Indication and Assessment:    Wound Outcome:    (Retired) Wound Type:    (Retired) Wound Length (cm):    (Retired) Wound Width (cm):    (Retired) Depth (cm):    Wound Description (Comments):    Removal Indications:    Wound Image   09/23/22 0900   Wound WDL ex 09/23/22 0900   Dressing Appearance Dry;Intact;Clean 09/23/22 0900   Drainage Amount Small 09/23/22 0900   Drainage Characteristics/Odor Serous 09/23/22 0900   Appearance Pink 09/23/22 0900   Tissue loss description Full thickness 09/23/22 0900   Red (%), Wound Tissue Color 100 % 09/23/22 0900   Periwound Area Intact;Dry 09/23/22 0900   Wound Edges Defined 09/23/22 0900   Wound Length (cm) 0.3 cm 09/23/22 0900   Wound Width (cm) 0.3 cm 09/23/22 0900   Wound Depth (cm) 0.2 cm 09/23/22 0900   Wound Volume (cm^3) 0.018 cm^3 09/23/22 0900   Wound Surface Area (cm^2) 0.09 cm^2 09/23/22 0900   Care Cleansed with:;Antimicrobial agent;Sterile normal saline 09/23/22 0900    Dressing Changed 09/23/22 0900   Off Loading Football dressing 09/23/22 0900   Dressing Change Due 09/30/22 09/23/22 0900       [REMOVED]      (Retired) Wound 05/06/22 1142 Diabetic Ulcer Right plantar Foot (Removed)   05/06/22 1142    Pre-existing: Yes   Primary Wound Type: Diabetic ulc   Side: Right   Orientation: plantar   Location: Foot   Wound Number:    Ankle-Brachial Index:    Pulses:    Removal Indication and Assessment:    Wound Outcome: Healed   (Retired) Wound Type:    (Retired) Wound Length (cm):    (Retired) Wound Width (cm):    (Retired) Depth (cm):    Wound Description (Comments):    Removal Indications:    Removed 01/20/23 1137   Wound Image   09/23/22 0900   Wound WDL ex 09/23/22 0900   Dressing Appearance Dry;Intact 09/23/22 0900   Drainage Amount Moderate 09/23/22 0900   Drainage Characteristics/Odor Serous 09/23/22 0900   Appearance Red 09/23/22 0900   Tissue loss description Full thickness 09/23/22 0900   Red (%), Wound Tissue Color 100 % 09/23/22 0900   Periwound Area Dry 09/23/22 0900   Wound Edges Defined 09/23/22 0900   Wound Length (cm) 1.2 cm 09/23/22 0900   Wound Width (cm) 1 cm 09/23/22 0900   Wound Depth (cm) 0.2 cm 09/23/22 0900   Wound Volume (cm^3) 0.24 cm^3 09/23/22 0900   Wound Surface Area (cm^2) 1.2 cm^2 09/23/22 0900           Plan:              Debridement    Date/Time: 9/23/2022 9:00 AM  Performed by: Marivel Peterson DPM  Authorized by: Marivel Peterson DPM       Wound Details:    Location:  Right foot    Location:  Right 3rd Metatarsal Head    Type of Debridement:  Excisional       Length (cm):  1.2       Area (sq cm):  1.2       Width (cm):  1       Percent Debrided (%):  100       Depth (cm):  0.2       Total Area Debrided (sq cm):  1.2    Depth of debridement:  Subcutaneous tissue    Tissue debrided:  Dermis, Epidermis and Subcutaneous    Devitalized tissue debrided:  Callus    Instruments:  Curette    Additional wounds:  1    2nd Wound Details:     Location:  Left foot     Location:  Left Midfoot    Location:  Left Midfoot    Type of Debridement:  Excisional       Length (cm):  0.3       Area (sq cm):  0.09       Width (cm):  0.3       Percent Debrided (%):  100       Depth (cm):  0.2       Total Area Debrided (sq cm):  0.09    Depth of debridement:  Subcutaneous tissue    Tissue debrided:  Dermis, Epidermis and Subcutaneous    Devitalized tissue debrided:  Callus and Fibrin    Instruments:  Curette    Bleeding:  Minimal  Hemostasis Achieved: Yes    Method Used:  Pressure  Patient tolerance:  Patient tolerated the procedure well with no immediate complications     Right plantar a little smaller left bigger. Wound beds red, no odor.     Orders Placed This Encounter   Procedures    Debridement     This order was created via procedure documentation     Standing Status:   Standing     Number of Occurrences:   1    Change dressing     BILATERAL FEET: Clean with NS. Vashe soak x10 mins. Gentian violet to periwound. Cavilon to plantar foot. Custom felt pad with small whole over wound to allow for drainage. [place] to wound bed. Mextra short x1, tim foam long x2. Football dressing (cast padding x3). Calamine Coflex. Change at nurse visit.          Follow up in about 1 week (around 9/30/2022) for nurse visit.

## 2022-09-26 ENCOUNTER — TELEPHONE (OUTPATIENT)
Dept: WOUND CARE | Facility: HOSPITAL | Age: 52
End: 2022-09-26
Payer: MEDICARE

## 2022-09-26 DIAGNOSIS — E11.621 DIABETIC ULCER OF RIGHT MIDFOOT ASSOCIATED WITH TYPE 2 DIABETES MELLITUS, WITH MUSCLE INVOLVEMENT WITHOUT EVIDENCE OF NECROSIS: Primary | ICD-10-CM

## 2022-09-26 DIAGNOSIS — L97.415 DIABETIC ULCER OF RIGHT MIDFOOT ASSOCIATED WITH TYPE 2 DIABETES MELLITUS, WITH MUSCLE INVOLVEMENT WITHOUT EVIDENCE OF NECROSIS: Primary | ICD-10-CM

## 2022-09-26 NOTE — TELEPHONE ENCOUNTER
----- Message from Alexis S. Barthelemy, MA sent at 9/26/2022 10:45 AM CDT -----  Regarding: FW: Requesting a call back  Contact: PAYAL HERNANDEZ JR. [[id number]]    ----- Message -----  From: Dixie Ramsey  Sent: 9/26/2022  10:38 AM CDT  To: Kristen Orta Staff  Subject: Requesting a call back                           Name of Who is Calling: Payal Hernandez Jr.            What is the request in detail: pt states he is requesting a call back in regards to the MRI , he stated they called him and rescheduled. He states he needs a later appt  on 9/27 if possible . Please advise           Can the clinic reply by [facility]: No           [facility][phone]

## 2022-09-26 NOTE — PROGRESS NOTES
"[facility] Progress Note    Subjective:       Patient ID: Indio Ryder Jr. is a 52 y.o. male.    Chief Complaint: Foot Ulcer and Foot Problem    Presents to podiatry clinic for evaluation of bilateral foot wounds.  Usually seen in wound clinic but due to patient going on vacation, being seen in podiatry clinic today. Wound dressings intact to BLE with moderate amount of drainage and breakthrough drainage also noted.     Review of Systems   Constitutional:  Negative for appetite change, chills, fatigue and fever.   HENT:  Negative for hearing loss.    Eyes:  Negative for photophobia and visual disturbance.   Respiratory:  Negative for cough, chest tightness, shortness of breath and wheezing.    Cardiovascular:  Positive for leg swelling. Negative for chest pain and palpitations.   Gastrointestinal:  Negative for constipation, diarrhea, nausea and vomiting.   Endocrine: Negative for cold intolerance and heat intolerance.   Genitourinary:  Negative for flank pain.   Musculoskeletal:  Positive for gait problem and myalgias. Negative for neck pain and neck stiffness.   Skin:  Positive for wound. Negative for color change.   Neurological:  Positive for numbness. Negative for weakness, light-headedness and headaches.        + paresthesia    Psychiatric/Behavioral:  Negative for sleep disturbance.        Objective:     Vitals:    09/07/22 0909   Weight: 105.7 kg (233 lb 0.4 oz)   Height: 6' 1" (1.854 m)   PainSc: 0-No pain   PainLoc: Foot          Physical Exam  Vitals reviewed.   Constitutional:       Appearance: He is well-developed.   Cardiovascular:      Comments: dorsalis pedis and posterior tibial pulses are palpable bilaterally. Capillary refill time is within normal limits. + pedal hair growth         Musculoskeletal:         General: No tenderness. Normal range of motion.      Comments: Normal angle, base, station of gait. All toes without clubbing, cyanosis, or signs of ischemia.  No pain " to palpation bilateral lower extremities.  Range of motion, stability, muscle strength, and muscle tone normal bilateral feet and legs.    Skin:     General: Skin is warm and dry.      Coloration: Skin is not ashen or cyanotic.      Findings: Wound (see below) present. No rash.      Nails: There is no clubbing.   Neurological:      Mental Status: He is alert and oriented to person, place, and time.      Sensory: No sensory deficit.      Comments: Diminished/loss of protective sensation all toes bilateral to 10 gram monofilament.     Psychiatric:         Behavior: Behavior normal.       Right          left          Assessment:           ICD-10-CM ICD-9-CM   1. Diabetic ulcer of left midfoot associated with type 2 diabetes mellitus, with fat layer exposed  E11.621 250.80    L97.422 707.14   2. Diabetic ulcer of right midfoot associated with type 2 diabetes mellitus, with muscle involvement without evidence of necrosis  E11.621 250.80    L97.415 707.14   3. Type II diabetes mellitus with neurological manifestations  E11.49 250.60                  Plan:                Greater than 50% of this visit spent on counseling and coordination of care.    Education about the prevention of limb loss.    Discussed wound healing cycle, skin integrity, ways to care for skin.Counseled patient on the effects of  blood glucose and ability to offload on healing. He verbalizes understanding that it can increase the chances of delayed healing and this prolonged exposure leads to infection or progression of infection which subsequently can result in loss of limb.    Adequate vitamin supplementation, protein intake, and hydration - discussed with patient    The wound is cleansed of foreign material as much as possible and the base inspected for bone or abscess. Base is granular and without bone nor joint exposure.    Wound to bilateral plantar feet improving, measuring smaller. However I am still concerned about the depth to the right  plantar forefoot wound.  MRI pending    Dressings:eusebia  Offloading:custom felt pads bilaterally, footballs    Follow-up:  in wound clinic next but should call [facility] immediately if any signs of infection, such as fever, chills, sweats, increased redness or pain.    Short-term goals include maintaining good offloading and minimizing bioburden, promoting granulation and epithelialization to healing.  Long-term goals include keeping the wound healed by good offloading and medical management under the direction of internist.     Shoe inspection. Diabetic Foot Education. Patient reminded of the importance of good nutrition and blood sugar control to help prevent podiatric complications of diabetes. Patient instructed on proper foot hygeine. We discussed wearing proper shoe gear, daily foot inspections, never walking without protective shoe gear, never putting sharp instruments to feet.          No orders of the defined types were placed in this encounter.       No follow-ups on file.

## 2022-09-27 ENCOUNTER — HOSPITAL ENCOUNTER (OUTPATIENT)
Dept: WOUND CARE | Facility: HOSPITAL | Age: 52
Discharge: HOME | End: 2022-09-27
Attending: FAMILY MEDICINE
Payer: MEDICARE

## 2022-09-27 ENCOUNTER — HOSPITAL ENCOUNTER (OUTPATIENT)
Dept: RADIOLOGY | Facility: HOSPITAL | Age: 52
Discharge: HOME | End: 2022-09-27
Attending: PODIATRIST
Payer: MEDICARE

## 2022-09-27 VITALS
DIASTOLIC BLOOD PRESSURE: 93 MMHG | HEART RATE: 88 BPM | SYSTOLIC BLOOD PRESSURE: 167 MMHG | RESPIRATION RATE: 20 BRPM | TEMPERATURE: 98 F

## 2022-09-27 DIAGNOSIS — E11.621 DIABETIC ULCER OF LEFT MIDFOOT ASSOCIATED WITH TYPE 2 DIABETES MELLITUS, WITH FAT LAYER EXPOSED: ICD-10-CM

## 2022-09-27 DIAGNOSIS — L97.512 DIABETIC ULCER OF RIGHT FOOT ASSOCIATED WITH TYPE 2 DIABETES MELLITUS, WITH FAT LAYER EXPOSED: Primary | ICD-10-CM

## 2022-09-27 DIAGNOSIS — L97.422 DIABETIC ULCER OF LEFT MIDFOOT ASSOCIATED WITH TYPE 2 DIABETES MELLITUS, WITH FAT LAYER EXPOSED: ICD-10-CM

## 2022-09-27 DIAGNOSIS — E11.621 DIABETIC ULCER OF RIGHT FOOT ASSOCIATED WITH TYPE 2 DIABETES MELLITUS, WITH FAT LAYER EXPOSED: Primary | ICD-10-CM

## 2022-09-27 DIAGNOSIS — E11.621 DIABETIC ULCER OF LEFT FOOT: ICD-10-CM

## 2022-09-27 DIAGNOSIS — L97.529 DIABETIC ULCER OF LEFT FOOT: ICD-10-CM

## 2022-09-27 PROCEDURE — A9585 GADOBUTROL INJECTION: HCPCS | Performed by: PODIATRIST

## 2022-09-27 PROCEDURE — 73720 MRI LWR EXTREMITY W/O&W/DYE: CPT | Mod: 26,RT,, | Performed by: RADIOLOGY

## 2022-09-27 PROCEDURE — 25500020 PHARM REV CODE 255: Performed by: PODIATRIST

## 2022-09-27 PROCEDURE — 29581 APPL MULTLAYER CMPRN SYS LEG: CPT | Mod: 50

## 2022-09-27 PROCEDURE — 73720 MRI LWR EXTREMITY W/O&W/DYE: CPT | Mod: TC,RT

## 2022-09-27 PROCEDURE — 73720 MRI FOOT (FOREFOOT) RIGHT W W/O CONTRAST: ICD-10-PCS | Mod: 26,RT,, | Performed by: RADIOLOGY

## 2022-09-27 RX ORDER — GADOBUTROL 604.72 MG/ML
10 INJECTION INTRAVENOUS
Status: COMPLETED | OUTPATIENT
Start: 2022-09-27 | End: 2022-09-27

## 2022-09-27 RX ADMIN — GADOBUTROL 10 ML: 604.72 INJECTION INTRAVENOUS at 10:09

## 2022-09-27 NOTE — PROGRESS NOTES
[facility] [address]  [address]  Nurse Visit    Subjective:       Patient seen in clinic today.  Dressing changed as ordered.      Assessment:          Wound 04/08/22 1137 Diabetic Ulcer Left plantar;medial Foot (Active)   04/08/22 1137    Pre-existing: Yes   Primary Wound Type: Diabetic ulc   Side: Left   Orientation: plantar;medial   Location: Foot   Wound Number:    Ankle-Brachial Index:    Pulses:    Removal Indication and Assessment:    Wound Outcome:    (Retired) Wound Type:    (Retired) Wound Length (cm):    (Retired) Wound Width (cm):    (Retired) Depth (cm):    Wound Description (Comments):    Removal Indications:    Wound WDL ex 09/27/22 0900   Dressing Appearance Intact;Moist drainage 09/27/22 0900   Drainage Amount Moderate 09/27/22 0900   Drainage Characteristics/Odor Serosanguineous 09/27/22 0900   Appearance Red 09/27/22 0900   Tissue loss description Partial thickness 09/27/22 0900   Periwound Area Macerated 09/27/22 0900   Wound Edges Defined 09/27/22 0900   Care Cleansed with:;Soap and water;Sterile normal saline 09/27/22 0900   Dressing Changed 09/27/22 0900   Periwound Care Moisture barrier applied;Skin barrier film applied 09/27/22 0900   Compression Two layer compression 09/27/22 0900   Off Loading Football dressing;Off loading shoe 09/27/22 0900   Dressing Change Due 09/30/22 09/27/22 0900            Wound 05/06/22 1142 Diabetic Ulcer Right plantar Foot (Active)   05/06/22 1142    Pre-existing: Yes   Primary Wound Type: Diabetic ulc   Side: Right   Orientation: plantar   Location: Foot   Wound Number:    Ankle-Brachial Index:    Pulses:    Removal Indication and Assessment:    Wound Outcome:    (Retired) Wound Type:    (Retired) Wound Length (cm):    (Retired) Wound Width (cm):    (Retired) Depth (cm):    Wound Description (Comments):    Removal Indications:    Wound WDL ex 09/27/22 0900   Dressing Appearance Intact;Moist drainage 09/27/22 0900    Drainage Amount Moderate 09/27/22 0900   Drainage Characteristics/Odor Serosanguineous 09/27/22 0900   Appearance Red 09/27/22 0900   Tissue loss description Full thickness 09/27/22 0900   Periwound Area Pale white 09/27/22 0900   Wound Edges Callused 09/27/22 0900   Care Soap and water;Sterile normal saline 09/27/22 0900   Dressing Changed 09/27/22 0900   Periwound Care Moisture barrier applied;Skin barrier film applied 09/27/22 0900   Compression Two layer compression 09/27/22 0900   Off Loading Football dressing;Off loading shoe 09/27/22 0900   Dressing Change Due 09/30/22 09/27/22 0900           Plan:     No orders of the defined types were placed in this encounter.          Follow up in about 3 days (around 9/30/2022) for wound car e visit.

## 2022-09-30 ENCOUNTER — HOSPITAL ENCOUNTER (OUTPATIENT)
Dept: WOUND CARE | Facility: HOSPITAL | Age: 52
Discharge: HOME | End: 2022-09-30
Attending: PODIATRIST
Payer: MEDICARE

## 2022-09-30 VITALS — DIASTOLIC BLOOD PRESSURE: 80 MMHG | TEMPERATURE: 98 F | SYSTOLIC BLOOD PRESSURE: 131 MMHG | HEART RATE: 97 BPM

## 2022-09-30 DIAGNOSIS — E11.621 DIABETIC ULCER OF RIGHT FOOT ASSOCIATED WITH TYPE 2 DIABETES MELLITUS, WITH FAT LAYER EXPOSED: Primary | ICD-10-CM

## 2022-09-30 DIAGNOSIS — E11.621 DIABETIC ULCER OF LEFT FOOT: ICD-10-CM

## 2022-09-30 DIAGNOSIS — L97.529 DIABETIC ULCER OF LEFT FOOT: ICD-10-CM

## 2022-09-30 DIAGNOSIS — L97.512 DIABETIC ULCER OF RIGHT FOOT ASSOCIATED WITH TYPE 2 DIABETES MELLITUS, WITH FAT LAYER EXPOSED: Primary | ICD-10-CM

## 2022-09-30 PROCEDURE — 11042 DBRDMT SUBQ TIS 1ST 20SQCM/<: CPT | Performed by: PODIATRIST

## 2022-09-30 PROCEDURE — 11042 WOUND DEBRIDEMENT: ICD-10-PCS | Mod: ,,, | Performed by: PODIATRIST

## 2022-09-30 PROCEDURE — 11042 DBRDMT SUBQ TIS 1ST 20SQCM/<: CPT | Mod: ,,, | Performed by: PODIATRIST

## 2022-09-30 PROCEDURE — 99499 UNLISTED E&M SERVICE: CPT | Mod: ,,, | Performed by: PODIATRIST

## 2022-09-30 PROCEDURE — 99499 NO LOS: ICD-10-PCS | Mod: ,,, | Performed by: PODIATRIST

## 2022-09-30 PROCEDURE — 29581 APPL MULTLAYER CMPRN SYS LEG: CPT | Mod: 50

## 2022-09-30 NOTE — PROGRESS NOTES
[facility]                Progress Note    Subjective:       Patient ID: Indio Ryder Jr. is a 52 y.o. male.    Chief Complaint: Wound Check and Dressing Change    Follow up wound care visit. Patient ambulated to exam room without assistance prescribed CAM boot on LLE and Darco shoe on Right Foot. Patient denies pain or discomfort at present. Dressing intact with small amounts of serosanguineous drainage from both foot wounds.     Review of Systems   Constitutional:  Negative for appetite change, chills, fatigue and fever.   HENT:  Negative for hearing loss.    Eyes:  Negative for photophobia and visual disturbance.   Respiratory:  Negative for cough, chest tightness, shortness of breath and wheezing.    Cardiovascular:  Positive for leg swelling. Negative for chest pain and palpitations.   Gastrointestinal:  Negative for constipation, diarrhea, nausea and vomiting.   Endocrine: Negative for cold intolerance and heat intolerance.   Genitourinary:  Negative for flank pain.   Musculoskeletal:  Positive for gait problem and myalgias. Negative for neck pain and neck stiffness.   Skin:  Positive for wound. Negative for color change.   Neurological:  Positive for numbness. Negative for weakness, light-headedness and headaches.        + paresthesia    Psychiatric/Behavioral:  Negative for sleep disturbance.        Objective:        Physical Exam  Vitals reviewed.   Constitutional:       Appearance: He is well-developed.   Cardiovascular:      Comments: dorsalis pedis and posterior tibial pulses are palpable bilaterally. Capillary refill time is within normal limits. + pedal hair growth         Musculoskeletal:         General: No tenderness. Normal range of motion.      Comments: Normal angle, base, station of gait. All toes without clubbing, cyanosis, or signs of ischemia.  No pain to palpation bilateral lower extremities.  Range of motion, stability, muscle strength,  and muscle tone normal bilateral feet and legs.    Skin:     General: Skin is warm and dry.      Coloration: Skin is not ashen or cyanotic.      Findings: Wound (see below) present. No rash.      Nails: There is no clubbing.   Neurological:      Mental Status: He is alert and oriented to person, place, and time.      Sensory: No sensory deficit.      Comments: Diminished/loss of protective sensation all toes bilateral to 10 gram monofilament.     Psychiatric:         Behavior: Behavior normal.       Vitals:    09/30/22 1106   BP: 131/80   Pulse: 97   Temp: 97.7 °F (36.5 °C)       Assessment:           ICD-10-CM ICD-9-CM   1. Diabetic ulcer of right foot associated with type 2 diabetes mellitus, with fat layer exposed  E11.621 250.80    L97.512 707.15   2. Diabetic ulcer of left foot  E11.621 250.80    L97.529 707.15            Wound 04/08/22 1137 Diabetic Ulcer Left plantar;medial Foot (Active)   04/08/22 1137    Pre-existing: Yes   Primary Wound Type: Diabetic ulc   Side: Left   Orientation: plantar;medial   Location: Foot   Wound Number:    Ankle-Brachial Index:    Pulses:    Removal Indication and Assessment:    Wound Outcome:    (Retired) Wound Type:    (Retired) Wound Length (cm):    (Retired) Wound Width (cm):    (Retired) Depth (cm):    Wound Description (Comments):    Removal Indications:    Wound Image   09/30/22 1108   Wound WDL ex 09/30/22 1108   Dressing Appearance Intact;Moist drainage 09/30/22 1108   Drainage Amount Small 09/30/22 1108   Drainage Characteristics/Odor Serosanguineous 09/30/22 1108   Appearance Red 09/30/22 1108   Tissue loss description Partial thickness 09/30/22 1108   Black (%), Wound Tissue Color 0 % 09/30/22 1108   Red (%), Wound Tissue Color 100 % 09/30/22 1108   Yellow (%), Wound Tissue Color 0 % 09/30/22 1108   Periwound Area Pale white 09/30/22 1108   Wound Edges Defined;Open 09/30/22 1108   Wound Length (cm) 0.3 cm 09/30/22 1108   Wound Width (cm) 0.3 cm 09/30/22 1108   Wound  Depth (cm) 0.2 cm 09/30/22 1108   Wound Volume (cm^3) 0.018 cm^3 09/30/22 1108   Wound Surface Area (cm^2) 0.09 cm^2 09/30/22 1108   Tunneling (depth (cm)/location) 0 09/30/22 1108   Undermining (depth (cm)/location) 0 09/30/22 1108   Care Cleansed with:;Antimicrobial agent;Sterile normal saline 09/30/22 1108   Dressing Changed 09/30/22 1108   Periwound Care Moisture barrier applied 09/30/22 1108   Compression Two layer compression 09/30/22 1108   Off Loading Football dressing;Off loading shoe 09/30/22 1108   Dressing Change Due 10/05/22 09/30/22 1108            Wound 05/06/22 1142 Diabetic Ulcer Right plantar Foot (Active)   05/06/22 1142    Pre-existing: Yes   Primary Wound Type: Diabetic ulc   Side: Right   Orientation: plantar   Location: Foot   Wound Number:    Ankle-Brachial Index:    Pulses:    Removal Indication and Assessment:    Wound Outcome:    (Retired) Wound Type:    (Retired) Wound Length (cm):    (Retired) Wound Width (cm):    (Retired) Depth (cm):    Wound Description (Comments):    Removal Indications:    Wound Image   09/30/22 1108   Wound WDL ex 09/30/22 1108   Dressing Appearance Intact;Moist drainage 09/30/22 1108   Drainage Amount Small 09/30/22 1108   Drainage Characteristics/Odor Serosanguineous 09/30/22 1108   Appearance Red 09/30/22 1108   Tissue loss description Partial thickness 09/30/22 1108   Black (%), Wound Tissue Color 0 % 09/30/22 1108   Red (%), Wound Tissue Color 100 % 09/30/22 1108   Yellow (%), Wound Tissue Color 0 % 09/30/22 1108   Periwound Area Macerated;Pale white 09/30/22 1108   Wound Edges Defined;Open 09/30/22 1108   Wound Length (cm) 1 cm 09/30/22 1108   Wound Width (cm) 0.7 cm 09/30/22 1108   Wound Depth (cm) 0.2 cm 09/30/22 1108   Wound Volume (cm^3) 0.14 cm^3 09/30/22 1108   Wound Surface Area (cm^2) 0.7 cm^2 09/30/22 1108   Tunneling (depth (cm)/location) 0 09/30/22 1108   Undermining (depth (cm)/location) 0 09/30/22 1108   Care Cleansed with:;Antimicrobial  agent;Sterile normal saline;Wound cleanser 09/30/22 1108   Dressing Changed 09/30/22 1108   Periwound Care Moisture barrier applied 09/30/22 1108   Compression Two layer compression 09/30/22 1108   Off Loading Football dressing;Off loading shoe 09/30/22 1108   Dressing Change Due 10/05/22 09/30/22 1108           Plan:              Left Lateral Plantar Foot measuring the same as last visit. Right Plantar Foot wound measuring smaller in (0.2 cm) length and (0.3 cm) width.    Wound Care done as per order.     Will attempt to screen patient for [employer] research study. He will follow [place] [facility] [place] on Monday. Return to clinic on Wednesday for nurse visit or prn    Wound Debridement    Date/Time: 9/30/2022 10:57 AM  Performed by: Marivel Peterson DPM  Authorized by: Marivel Peterson DPM     Consent Done?:  Yes (Written)    Preparation: Patient was prepped and draped in usual sterile fashion      Wound Details:    Location:  Right foot    Debridement - 1st Wound - Specific Location: plantar forefoot.    Type of Debridement:  Excisional       Length (cm):  1       Area (sq cm):  0.7       Width (cm):  0.7       Percent Debrided (%):  100       Depth (cm):  0.2       Total Area Debrided (sq cm):  0.7    Depth of debridement:  Subcutaneous tissue    Tissue debrided:  Dermis, Subcutaneous and Epidermis    Devitalized tissue debrided:  Fibrin and Callus    Instruments:  Curette    Bleeding:  Minimal  Hemostasis Achieved: Yes    Method Used:  Pressure  Patient tolerance:  Patient tolerated the procedure well with no immediate complications    Orders Placed This Encounter   Procedures    Debridement     This order was created via procedure documentation     Standing Status:   Standing     Number of Occurrences:   1    Change dressing     Clean with NS. Vashe soak x10 mins.   BILATERAL FEET: Gentian violet to periwound. Cavilon to plantar foot. Custom felt pad with small whole over wound to allow for drainage. [place] to wound  bed. Mextra short x1, tim foam long x2. Football dressing (cast padding x3). Calamine Coflex. Change at nurse visit.        Follow up in about 3 days (around 10/3/2022) for wound care.

## 2022-10-03 ENCOUNTER — OFFICE VISIT (OUTPATIENT)
Dept: PODIATRY | Facility: CLINIC | Age: 52
End: 2022-10-03
Payer: MEDICARE

## 2022-10-03 VITALS
BODY MASS INDEX: 32.07 KG/M2 | SYSTOLIC BLOOD PRESSURE: 164 MMHG | HEIGHT: 73 IN | DIASTOLIC BLOOD PRESSURE: 98 MMHG | WEIGHT: 242 LBS | HEART RATE: 90 BPM | RESPIRATION RATE: 18 BRPM

## 2022-10-03 DIAGNOSIS — E08.621 DIABETIC ULCER OF RIGHT MIDFOOT ASSOCIATED WITH DIABETES MELLITUS DUE TO UNDERLYING CONDITION, LIMITED TO BREAKDOWN OF SKIN: ICD-10-CM

## 2022-10-03 DIAGNOSIS — L97.415 DIABETIC ULCER OF RIGHT MIDFOOT ASSOCIATED WITH TYPE 2 DIABETES MELLITUS, WITH MUSCLE INVOLVEMENT WITHOUT EVIDENCE OF NECROSIS: Primary | ICD-10-CM

## 2022-10-03 DIAGNOSIS — L97.411 DIABETIC ULCER OF RIGHT MIDFOOT ASSOCIATED WITH DIABETES MELLITUS DUE TO UNDERLYING CONDITION, LIMITED TO BREAKDOWN OF SKIN: ICD-10-CM

## 2022-10-03 DIAGNOSIS — E11.621 DIABETIC ULCER OF RIGHT MIDFOOT ASSOCIATED WITH TYPE 2 DIABETES MELLITUS, WITH MUSCLE INVOLVEMENT WITHOUT EVIDENCE OF NECROSIS: Primary | ICD-10-CM

## 2022-10-03 DIAGNOSIS — M86.171 ACUTE OSTEOMYELITIS OF METATARSAL BONE OF RIGHT FOOT: ICD-10-CM

## 2022-10-03 DIAGNOSIS — E11.621 DIABETIC ULCER OF LEFT MIDFOOT ASSOCIATED WITH TYPE 2 DIABETES MELLITUS, WITH FAT LAYER EXPOSED: ICD-10-CM

## 2022-10-03 DIAGNOSIS — L97.422 DIABETIC ULCER OF LEFT MIDFOOT ASSOCIATED WITH TYPE 2 DIABETES MELLITUS, WITH FAT LAYER EXPOSED: ICD-10-CM

## 2022-10-03 PROCEDURE — 99214 OFFICE O/P EST MOD 30 MIN: CPT | Mod: S$PBB,,, | Performed by: PODIATRIST

## 2022-10-03 PROCEDURE — 99999 PR PBB SHADOW E&M-EST. PATIENT-LVL V: CPT | Mod: PBBFAC,,, | Performed by: PODIATRIST

## 2022-10-03 PROCEDURE — 99214 PR OFFICE/OUTPT VISIT, EST, LEVL IV, 30-39 MIN: ICD-10-PCS | Mod: S$PBB,,, | Performed by: PODIATRIST

## 2022-10-03 PROCEDURE — 99999 PR PBB SHADOW E&M-EST. PATIENT-LVL V: ICD-10-PCS | Mod: PBBFAC,,, | Performed by: PODIATRIST

## 2022-10-03 PROCEDURE — 99215 OFFICE O/P EST HI 40 MIN: CPT | Mod: PBBFAC | Performed by: PODIATRIST

## 2022-10-03 NOTE — PROGRESS NOTES
"[facility] Progress Note    Subjective:       Patient ID: Indio Ryder Jr. is a 52 y.o. male.    Chief Complaint: Wound Care (Bilateral foot ulcers ) and Dressing Change (Bilateral unna boots )    Presents to podiatry for evaluation and treatment of BLE and consideration for research study enrollment.  BLE intact with small amount of drainage noted to left plantar foot wound and moderate amount of drainage noted to right plantar foot wound.     Review of Systems   Constitutional:  Negative for appetite change, chills, fatigue and fever.   HENT:  Negative for hearing loss.    Eyes:  Negative for photophobia and visual disturbance.   Respiratory:  Negative for cough, chest tightness, shortness of breath and wheezing.    Cardiovascular:  Positive for leg swelling. Negative for chest pain and palpitations.   Gastrointestinal:  Negative for constipation, diarrhea, nausea and vomiting.   Endocrine: Negative for cold intolerance and heat intolerance.   Genitourinary:  Negative for flank pain.   Musculoskeletal:  Positive for gait problem and myalgias. Negative for neck pain and neck stiffness.   Skin:  Positive for wound. Negative for color change.   Neurological:  Positive for numbness. Negative for weakness, light-headedness and headaches.        + paresthesia    Psychiatric/Behavioral:  Negative for sleep disturbance.        Objective:     Vitals:    10/03/22 1135   BP: (!) 164/98   Pulse: 90   Resp: 18   Weight: 109.8 kg (242 lb)   Height: 6' 1" (1.854 m)   PainSc: 0-No pain          Physical Exam  Vitals reviewed.   Constitutional:       Appearance: He is well-developed.   Cardiovascular:      Comments: dorsalis pedis and posterior tibial pulses are palpable bilaterally. Capillary refill time is within normal limits. + pedal hair growth         Musculoskeletal:         General: No tenderness. Normal range of motion.      Comments: Normal angle, base, station of gait. All toes without clubbing, " cyanosis, or signs of ischemia.  No pain to palpation bilateral lower extremities.  Range of motion, stability, muscle strength, and muscle tone normal bilateral feet and legs.    Skin:     General: Skin is warm and dry.      Coloration: Skin is not ashen or cyanotic.      Findings: Wound (see below) present. No rash.      Nails: There is no clubbing.   Neurological:      Mental Status: He is alert and oriented to person, place, and time.      Sensory: No sensory deficit.      Comments: Diminished/loss of protective sensation all toes bilateral to 10 gram monofilament.     Psychiatric:         Behavior: Behavior normal.         10/03/2022    Right            Left            Assessment:           ICD-10-CM ICD-9-CM   1. Diabetic ulcer of right midfoot associated with type 2 diabetes mellitus, with muscle involvement without evidence of necrosis  E11.621 250.80    L97.415 707.14   2. Acute osteomyelitis of metatarsal bone of right foot  M86.171 730.07   3. Diabetic ulcer of right midfoot associated with diabetes mellitus due to underlying condition, limited to breakdown of skin  E08.621 249.80    L97.411 707.14   4. Diabetic ulcer of left midfoot associated with type 2 diabetes mellitus, with fat layer exposed  E11.621 250.80    L97.422 707.14                  Plan:              Greater than 50% of this visit spent on counseling and coordination of care. The importance of tight glycemic control, adequate vitamin supplementation, protein intake, and hydration - discussed with patient    Unfortunately, due to recent adenocarcinoma, patient is excluded from research study participation     Again discussed options for treatment of MRI confirmed OM.  Options were discussed with possible side effects of each. Transmetatarsal amputation vs pan met head resection vs osteoclasis vs bone biopsy for C&S IV abx for six weeks with aggressive wound care for several months with no guarantee of wound resolution or PO abx (if  indicated based on culture) for six weeks with aggressive wound care for several months with no guarantee of wound resolution (discussed regular lab work involved with abx options).      Patient will be seen by ID for further evaluation and discussion on if he is a candidate for long term abx therapy or if amputation will be more appropriate.     Dr. Almonte will perform bone biopsy next week     We will continue to follow and work in partnership with treatment team on how to best treat patient concern and diagnosis.      Procedures    Orders Placed This Encounter   Procedures    Ambulatory referral/consult to Infectious Disease     Standing Status:   Future     Standing Expiration Date:   11/3/2023     Referral Priority:   Routine     Referral Type:   Consultation     Referral Reason:   Specialty Services Required     Requested Specialty:   Infectious Diseases     Number of Visits Requested:   1        No follow-ups on file.

## 2022-10-06 ENCOUNTER — OFFICE VISIT (OUTPATIENT)
Dept: OPTOMETRY | Facility: CLINIC | Age: 52
End: 2022-10-06
Payer: MEDICARE

## 2022-10-06 ENCOUNTER — PATIENT MESSAGE (OUTPATIENT)
Dept: PODIATRY | Facility: CLINIC | Age: 52
End: 2022-10-06
Payer: MEDICARE

## 2022-10-06 ENCOUNTER — HOSPITAL ENCOUNTER (OUTPATIENT)
Dept: WOUND CARE | Facility: HOSPITAL | Age: 52
Discharge: HOME | End: 2022-10-06
Attending: FAMILY MEDICINE
Payer: MEDICARE

## 2022-10-06 VITALS — HEART RATE: 88 BPM | TEMPERATURE: 98 F | SYSTOLIC BLOOD PRESSURE: 160 MMHG | DIASTOLIC BLOOD PRESSURE: 88 MMHG

## 2022-10-06 DIAGNOSIS — E11.621 DIABETIC ULCER OF RIGHT FOOT ASSOCIATED WITH TYPE 2 DIABETES MELLITUS, WITH FAT LAYER EXPOSED: Primary | ICD-10-CM

## 2022-10-06 DIAGNOSIS — L97.529 DIABETIC ULCER OF LEFT FOOT: ICD-10-CM

## 2022-10-06 DIAGNOSIS — H35.033 HYPERTENSIVE RETINOPATHY, BILATERAL: ICD-10-CM

## 2022-10-06 DIAGNOSIS — H25.13 SENILE NUCLEAR CATARACT, BILATERAL: ICD-10-CM

## 2022-10-06 DIAGNOSIS — E11.3213 BOTH EYES AFFECTED BY MILD NONPROLIFERATIVE DIABETIC RETINOPATHY WITH MACULAR EDEMA, ASSOCIATED WITH TYPE 2 DIABETES MELLITUS: Primary | ICD-10-CM

## 2022-10-06 DIAGNOSIS — L97.512 DIABETIC ULCER OF RIGHT FOOT ASSOCIATED WITH TYPE 2 DIABETES MELLITUS, WITH FAT LAYER EXPOSED: Primary | ICD-10-CM

## 2022-10-06 DIAGNOSIS — E11.65 TYPE 2 DIABETES MELLITUS WITH HYPERGLYCEMIA, WITH LONG-TERM CURRENT USE OF INSULIN: ICD-10-CM

## 2022-10-06 DIAGNOSIS — E11.621 DIABETIC ULCER OF LEFT FOOT: ICD-10-CM

## 2022-10-06 DIAGNOSIS — Z79.4 TYPE 2 DIABETES MELLITUS WITH HYPERGLYCEMIA, WITH LONG-TERM CURRENT USE OF INSULIN: ICD-10-CM

## 2022-10-06 PROCEDURE — 99999 PR PBB SHADOW E&M-EST. PATIENT-LVL III: CPT | Mod: PBBFAC,,, | Performed by: OPTOMETRIST

## 2022-10-06 PROCEDURE — 99213 OFFICE O/P EST LOW 20 MIN: CPT | Mod: PBBFAC | Performed by: OPTOMETRIST

## 2022-10-06 PROCEDURE — 99999 PR PBB SHADOW E&M-EST. PATIENT-LVL III: ICD-10-PCS | Mod: PBBFAC,,, | Performed by: OPTOMETRIST

## 2022-10-06 PROCEDURE — 92014 PR EYE EXAM, EST PATIENT,COMPREHESV: ICD-10-PCS | Mod: S$PBB,,, | Performed by: OPTOMETRIST

## 2022-10-06 PROCEDURE — 92014 COMPRE OPH EXAM EST PT 1/>: CPT | Mod: S$PBB,,, | Performed by: OPTOMETRIST

## 2022-10-06 NOTE — PROGRESS NOTES
HPI    DSL- [birth year]/26[birth year]2019     53 y/o male presents to clinic for Diabetic Eye Exam with history of Mild   NPDR, bilateral. Pt presents with no c/o of vision change or loss. BSL was   83 yesterday morning. No floaters, flashes of light, diplopia, eye   allergies, or glare reported today.    Eyemeds  No gtts  Last edited by Amee Casas on 10/6/2022  8:46 AM.        ROS    Positive for: Endocrine, Cardiovascular  Negative for: Constitutional, Gastrointestinal, Neurological, Skin,   Genitourinary, Musculoskeletal, HENT, Eyes, Respiratory, Psychiatric,   Allergic/Imm, Heme/Lymph  Last edited by Gretchen Smith, OD on 10/6/2022  9:23 AM.        Assessment /Plan     For exam results, see Encounter Report.    Both eyes affected by mild nonproliferative diabetic retinopathy with macular edema, associated with type 2 diabetes mellitus    Type 2 diabetes mellitus with hyperglycemia, with long-term current use of insulin  -     Ambulatory referral/consult to Optometry    Hypertensive retinopathy, bilateral    Senile nuclear cataract, bilateral    MONITOR. ED PT ON ALL EXAM FINDINGS  TYPE 2 DM W/ MILD NPDR OU W/ DME OU. CONTINUE WITH PCP FOR GLYCEMIC AND BP CONTROL   CONTINUE WITH RETINA FOR RETINA CARE.   MILD NS OU; UV PROTECTION; MONITOR.   RTC 1 YR//PRN FOR REE/DFE AND 6 MONTHS W/ RETINA

## 2022-10-06 NOTE — PROGRESS NOTES
[facility]  [address]  [address]  Nurse Visit    Subjective:       Patient seen in clinic today.  Dressing changed as ordered.      Assessment:          Wound 04/08/22 1137 Diabetic Ulcer Left plantar;medial Foot (Active)   04/08/22 1137    Pre-existing: Yes   Primary Wound Type: Diabetic ulc   Side: Left   Orientation: plantar;medial   Location: Foot   Wound Number:    Ankle-Brachial Index:    Pulses:    Removal Indication and Assessment:    Wound Outcome:    (Retired) Wound Type:    (Retired) Wound Length (cm):    (Retired) Wound Width (cm):    (Retired) Depth (cm):    Wound Description (Comments):    Removal Indications:    Wound WDL ex 10/06/22 1140   Dressing Appearance Intact;Moist drainage 10/06/22 1140   Drainage Amount Moderate 10/06/22 1140   Drainage Characteristics/Odor Serosanguineous 10/06/22 1140   Appearance Red 10/06/22 1140   Tissue loss description Partial thickness 10/06/22 1140   Black (%), Wound Tissue Color 0 % 10/06/22 1140   Red (%), Wound Tissue Color 100 % 10/06/22 1140   Yellow (%), Wound Tissue Color 0 % 10/06/22 1140   Periwound Area Macerated;Pale white 10/06/22 1140   Wound Edges Defined 10/06/22 1140   Care Cleansed with:;Soap and water;Sterile normal saline 10/06/22 1140   Dressing Changed 10/06/22 1140   Periwound Care Moisture barrier applied 10/06/22 1140   Compression Two layer compression 10/06/22 1140   Off Loading Football dressing;Off loading shoe 10/06/22 1140            Wound 05/06/22 1142 Diabetic Ulcer Right plantar Foot (Active)   05/06/22 1142    Pre-existing: Yes   Primary Wound Type: Diabetic ulc   Side: Right   Orientation: plantar   Location: Foot   Wound Number:    Ankle-Brachial Index:    Pulses:    Removal Indication and Assessment:    Wound Outcome:    (Retired) Wound Type:    (Retired) Wound Length (cm):    (Retired) Wound Width (cm):    (Retired) Depth (cm):    Wound Description (Comments):    Removal Indications:     Wound WDL ex 10/06/22 1140   Dressing Appearance Intact;Moist drainage 10/06/22 1140   Drainage Amount Moderate 10/06/22 1140   Drainage Characteristics/Odor Serosanguineous 10/06/22 1140   Appearance Red 10/06/22 1140   Tissue loss description Partial thickness 10/06/22 1140   Periwound Area Macerated;Pale white 10/06/22 1140   Wound Edges Defined 10/06/22 1140   Care Cleansed with:;Antimicrobial agent;Soap and water;Sterile normal saline 10/06/22 1140   Dressing Changed 10/06/22 1140   Periwound Care Moisture barrier applied 10/06/22 1140   Compression Two layer compression 10/06/22 1140   Off Loading Football dressing;Off loading shoe 10/06/22 1140           Plan:     No orders of the defined types were placed in this encounter.          No follow-ups on file.

## 2022-10-11 ENCOUNTER — OFFICE VISIT (OUTPATIENT)
Dept: INFECTIOUS DISEASES | Facility: CLINIC | Age: 52
End: 2022-10-11
Payer: MEDICARE

## 2022-10-11 DIAGNOSIS — L08.9 WOUND INFECTION: ICD-10-CM

## 2022-10-11 DIAGNOSIS — E11.65 TYPE 2 DIABETES MELLITUS WITH HYPERGLYCEMIA, WITH LONG-TERM CURRENT USE OF INSULIN: ICD-10-CM

## 2022-10-11 DIAGNOSIS — M86.9 OSTEOMYELITIS, UNSPECIFIED SITE, UNSPECIFIED TYPE: Primary | ICD-10-CM

## 2022-10-11 DIAGNOSIS — E10.42 DIABETIC POLYNEUROPATHY ASSOCIATED WITH TYPE 1 DIABETES MELLITUS: ICD-10-CM

## 2022-10-11 DIAGNOSIS — Z79.4 TYPE 2 DIABETES MELLITUS WITH HYPERGLYCEMIA, WITH LONG-TERM CURRENT USE OF INSULIN: ICD-10-CM

## 2022-10-11 DIAGNOSIS — T14.8XXA WOUND INFECTION: ICD-10-CM

## 2022-10-11 PROCEDURE — 99215 OFFICE O/P EST HI 40 MIN: CPT | Mod: 95,,, | Performed by: INTERNAL MEDICINE

## 2022-10-11 PROCEDURE — 99215 PR OFFICE/OUTPT VISIT, EST, LEVL V, 40-54 MIN: ICD-10-PCS | Mod: 95,,, | Performed by: INTERNAL MEDICINE

## 2022-10-11 RX ORDER — LINEZOLID 600 MG/1
600 TABLET, FILM COATED ORAL EVERY 12 HOURS
Qty: 60 TABLET | Refills: 0 | Status: SHIPPED | OUTPATIENT
Start: 2022-10-11 | End: 2022-10-31 | Stop reason: ALTCHOICE

## 2022-10-11 NOTE — PROGRESS NOTES
The patient location is: Home  The chief complaint leading to consultation is: Osteo    Visit type: audiovisual    Face to Face time with patient: 20 mins  40 minutes of total time spent on the encounter, which includes face to face time and non-face to face time preparing to see the patient (eg, review of tests), Obtaining and/or reviewing separately obtained history, Documenting clinical information in the electronic or other health record, Independently interpreting results (not separately reported) and communicating results to the patient/family/caregiver, or Care coordination (not separately reported).         Each patient to whom he or she provides medical services by telemedicine is:  (1) informed of the relationship between the physician and patient and the respective role of any other health care provider with respect to management of the patient; and (2) notified that he or she may decline to receive medical services by telemedicine and may withdraw from such care at any time.    Notes:       Subjective:      Chief Complaint: Evaluation for osteomyelitis    History of Present Illness  52-year-old male with history of IDDM2, diabetic neuropathy, multiple episodes of foot osteomyelitis, HTN, HLD, presents for evaluation of right foot osteomyelitis.    Patient reports new wound on right foot - started July 2022. Reported he got his boot wet and ulcer subsequently healed. Cultures obtained with E faecalis. Patient completed multiple courses of antibiotics including minocycline, doxycycline. Patient with progression of wound - MRI notable for underlying osteomyelitis. Patient presents to ID for further evaluation.    Patient reports wound has not healed. He will be seeing Dr. Almonte on 10/12/2022 for follow-up.    Review of Systems   Constitutional:  Negative for chills, diaphoresis, fever and weight loss.   HENT:  Negative for congestion, sinus pain and sore throat.    Eyes:  Negative for photophobia and pain.    Respiratory:  Negative for cough, sputum production and shortness of breath.    Cardiovascular:  Negative for chest pain and leg swelling.   Gastrointestinal:  Negative for abdominal pain, diarrhea, nausea and vomiting.   Genitourinary:  Negative for dysuria and hematuria.   Musculoskeletal:  Negative for joint pain.   Skin:  Negative for rash.        +wound   Neurological:  Negative for focal weakness and headaches.   Psychiatric/Behavioral:  Negative for depression. The patient is not nervous/anxious.        Objective:   Physical Exam  Constitutional:       General: He is not in acute distress.     Appearance: He is well-developed. He is not diaphoretic.   HENT:      Head: Normocephalic and atraumatic.   Eyes:      Conjunctiva/sclera: Conjunctivae normal.   Pulmonary:      Effort: Pulmonary effort is normal. No respiratory distress.   Abdominal:      General: There is no distension.      Palpations: Abdomen is soft.   Musculoskeletal:         General: Normal range of motion.   Skin:     General: Skin is warm and dry.      Findings: No erythema or rash.   Neurological:      Mental Status: He is alert and oriented to person, place, and time.   Psychiatric:         Behavior: Behavior normal.                     Significant results reviewed:    Sodium   Date Value Ref Range Status   10/12/2022 142 136 - 145 mmol/L Final   09/27/2022 139 136 - 145 mmol/L Final   07/22/2022 139 136 - 145 mmol/L Final      Potassium   Date Value Ref Range Status   10/12/2022 4.0 3.5 - 5.1 mmol/L Final   09/27/2022 3.8 3.5 - 5.1 mmol/L Final   07/22/2022 4.1 3.5 - 5.1 mmol/L Final      Chloride   Date Value Ref Range Status   10/12/2022 105 95 - 110 mmol/L Final   09/27/2022 107 95 - 110 mmol/L Final   07/22/2022 104 95 - 110 mmol/L Final      CO2   Date Value Ref Range Status   10/12/2022 30 (H) 23 - 29 mmol/L Final   09/27/2022 29 23 - 29 mmol/L Final   07/22/2022 26 23 - 29 mmol/L Final      BUN   Date Value Ref Range Status    10/12/2022 11 6 - 20 mg/dL Final   09/27/2022 14 6 - 20 mg/dL Final   07/22/2022 15 6 - 20 mg/dL Final      Creatinine   Date Value Ref Range Status   10/12/2022 1.1 0.5 - 1.4 mg/dL Final   09/27/2022 1.2 0.5 - 1.4 mg/dL Final   07/22/2022 1.3 0.5 - 1.4 mg/dL Final      Glucose   Date Value Ref Range Status   10/12/2022 228 (H) 70 - 110 mg/dL Final   09/27/2022 222 (H) 70 - 110 mg/dL Final   07/22/2022 234 (H) 70 - 110 mg/dL Final       ALT   Date Value Ref Range Status   10/12/2022 22 10 - 44 U/L Final   09/27/2022 19 10 - 44 U/L Final   07/22/2022 41 10 - 44 U/L Final      AST   Date Value Ref Range Status   10/12/2022 18 10 - 40 U/L Final   09/27/2022 18 10 - 40 U/L Final   07/22/2022 44 (H) 10 - 40 U/L Final      Total Bilirubin   Date Value Ref Range Status   10/12/2022 0.8 0.1 - 1.0 mg/dL Final     Comment:     For infants and newborns, interpretation of results should be based  on gestational age, weight and in agreement with clinical  observations.    Premature Infant recommended reference ranges:  Up to 24 hours.............<8.0 mg/dL  Up to 48 hours............<12.0 mg/dL  3-5 days..................<15.0 mg/dL  6-29 days.................<15.0 mg/dL     09/27/2022 0.5 0.1 - 1.0 mg/dL Final     Comment:     For infants and newborns, interpretation of results should be based  on gestational age, weight and in agreement with clinical  observations.    Premature Infant recommended reference ranges:  Up to 24 hours.............<8.0 mg/dL  Up to 48 hours............<12.0 mg/dL  3-5 days..................<15.0 mg/dL  6-29 days.................<15.0 mg/dL     07/22/2022 0.4 0.1 - 1.0 mg/dL Final     Comment:     For infants and newborns, interpretation of results should be based  on gestational age, weight and in agreement with clinical  observations.    Premature Infant recommended reference ranges:  Up to 24 hours.............<8.0 mg/dL  Up to 48 hours............<12.0 mg/dL  3-5 days..................<15.0  mg/dL  6-29 days.................<15.0 mg/dL        Albumin   Date Value Ref Range Status   10/12/2022 3.4 (L) 3.5 - 5.2 g/dL Final   09/27/2022 3.5 3.5 - 5.2 g/dL Final   07/22/2022 3.1 (L) 3.5 - 5.2 g/dL Final      Total Protein   Date Value Ref Range Status   10/12/2022 7.5 6.0 - 8.4 g/dL Final   09/27/2022 7.5 6.0 - 8.4 g/dL Final   07/22/2022 7.7 6.0 - 8.4 g/dL Final      Alkaline Phosphatase   Date Value Ref Range Status   10/12/2022 189 (H) 55 - 135 U/L Final   09/27/2022 183 (H) 55 - 135 U/L Final   07/22/2022 203 (H) 55 - 135 U/L Final        WBC   Date Value Ref Range Status   10/12/2022 3.99 3.90 - 12.70 K/uL Final   07/22/2022 5.01 3.90 - 12.70 K/uL Final   04/29/2022 5.51 3.90 - 12.70 K/uL Final      Hemoglobin   Date Value Ref Range Status   10/12/2022 12.7 (L) 14.0 - 18.0 g/dL Final   07/22/2022 12.1 (L) 14.0 - 18.0 g/dL Final   04/29/2022 9.9 (L) 14.0 - 18.0 g/dL Final      POC Hematocrit   Date Value Ref Range Status   05/30/2017 44 36 - 54 %PCV Final     Hematocrit   Date Value Ref Range Status   10/12/2022 41.0 40.0 - 54.0 % Final   07/22/2022 37.3 (L) 40.0 - 54.0 % Final   04/29/2022 31.5 (L) 40.0 - 54.0 % Final      Platelets   Date Value Ref Range Status   10/12/2022 210 150 - 450 K/uL Final   07/22/2022 196 150 - 450 K/uL Final   04/29/2022 183 150 - 450 K/uL Final         Enterococcus faecalis     CULTURE, AEROBIC  (SPECIFY SOURCE)     Ampicillin <=2 mcg/mL Sensitive     Gentamicin Synergy Screen <=500 mcg/mL Sensitive     Tetracycline <=4 mcg/mL Sensitive     Vancomycin 2 mcg/mL Sensitive        MR Foot 9/27/2022  Focal definite cutaneous plantar ulcer at 3rd MTP joint region with adjacent osteomyelitis septic joint 3rd MTP joint.  Additional bone joint soft tissue structures findings discussed above and clinical correlation requested.    Assessment:   52-year-old male with history of IDDM2, diabetic neuropathy, multiple episodes of foot osteomyelitis, HTN, HLD, presents for evaluation of  right foot osteomyelitis - cultures from July with Enterococcus faecalis.    - Prior notes by Dr. Peterson reviewed  - Results reviewed as above      Plan:   - Start linezolid 600 mg PO BID  - Will monitor drug therapy for toxicity, including pancytopenia    - The following labs were ordered:  CBC with diff, CMP, CRP      40 minutes of total time spent on the encounter, which includes face to face time and non-face to face time preparing to see the patient (eg, review of tests), Obtaining and/or reviewing separately obtained history, Documenting clinical information in the electronic or other health record, Independently interpreting results (not separately reported) and communicating results to the patient/family/caregiver, or Care coordination (not separately reported).       Maria Dolores Stevenson MD MPH  [facility] - Infectious Disease    ADDENDUM  Patient seen in Podiatry clinic 10/12/2022 - bone cultures obtained  Cultures with GNR - will need to follow-up final cultures

## 2022-10-12 ENCOUNTER — OFFICE VISIT (OUTPATIENT)
Dept: PODIATRY | Facility: CLINIC | Age: 52
End: 2022-10-12
Payer: MEDICARE

## 2022-10-12 ENCOUNTER — LAB VISIT (OUTPATIENT)
Dept: LAB | Facility: HOSPITAL | Age: 52
End: 2022-10-12
Payer: MEDICARE

## 2022-10-12 VITALS — BODY MASS INDEX: 32.07 KG/M2 | WEIGHT: 242 LBS | HEIGHT: 73 IN | RESPIRATION RATE: 18 BRPM

## 2022-10-12 DIAGNOSIS — L97.415 DIABETIC ULCER OF RIGHT MIDFOOT ASSOCIATED WITH TYPE 2 DIABETES MELLITUS, WITH MUSCLE INVOLVEMENT WITHOUT EVIDENCE OF NECROSIS: Primary | ICD-10-CM

## 2022-10-12 DIAGNOSIS — M86.9 OSTEOMYELITIS, UNSPECIFIED SITE, UNSPECIFIED TYPE: ICD-10-CM

## 2022-10-12 DIAGNOSIS — E11.621 DIABETIC ULCER OF RIGHT MIDFOOT ASSOCIATED WITH TYPE 2 DIABETES MELLITUS, WITH MUSCLE INVOLVEMENT WITHOUT EVIDENCE OF NECROSIS: Primary | ICD-10-CM

## 2022-10-12 DIAGNOSIS — M86.171 ACUTE OSTEOMYELITIS OF METATARSAL BONE OF RIGHT FOOT: ICD-10-CM

## 2022-10-12 DIAGNOSIS — E08.621 DIABETIC ULCER OF RIGHT MIDFOOT ASSOCIATED WITH DIABETES MELLITUS DUE TO UNDERLYING CONDITION, LIMITED TO BREAKDOWN OF SKIN: ICD-10-CM

## 2022-10-12 DIAGNOSIS — L97.411 DIABETIC ULCER OF RIGHT MIDFOOT ASSOCIATED WITH DIABETES MELLITUS DUE TO UNDERLYING CONDITION, LIMITED TO BREAKDOWN OF SKIN: ICD-10-CM

## 2022-10-12 LAB
ALBUMIN SERPL BCP-MCNC: 3.4 G/DL (ref 3.5–5.2)
ALP SERPL-CCNC: 189 U/L (ref 55–135)
ALT SERPL W/O P-5'-P-CCNC: 22 U/L (ref 10–44)
ANION GAP SERPL CALC-SCNC: 7 MMOL/L (ref 8–16)
AST SERPL-CCNC: 18 U/L (ref 10–40)
BASOPHILS # BLD AUTO: 0.02 K/UL (ref 0–0.2)
BASOPHILS NFR BLD: 0.5 % (ref 0–1.9)
BILIRUB SERPL-MCNC: 0.8 MG/DL (ref 0.1–1)
BUN SERPL-MCNC: 11 MG/DL (ref 6–20)
CALCIUM SERPL-MCNC: 9.6 MG/DL (ref 8.7–10.5)
CHLORIDE SERPL-SCNC: 105 MMOL/L (ref 95–110)
CO2 SERPL-SCNC: 30 MMOL/L (ref 23–29)
CREAT SERPL-MCNC: 1.1 MG/DL (ref 0.5–1.4)
CRP SERPL-MCNC: 1 MG/L (ref 0–8.2)
DIFFERENTIAL METHOD: ABNORMAL
EOSINOPHIL # BLD AUTO: 0.1 K/UL (ref 0–0.5)
EOSINOPHIL NFR BLD: 2.5 % (ref 0–8)
ERYTHROCYTE [DISTWIDTH] IN BLOOD BY AUTOMATED COUNT: 13.2 % (ref 11.5–14.5)
EST. GFR  (NO RACE VARIABLE): >60 ML/MIN/1.73 M^2
GLUCOSE SERPL-MCNC: 228 MG/DL (ref 70–110)
HCT VFR BLD AUTO: 41 % (ref 40–54)
HGB BLD-MCNC: 12.7 G/DL (ref 14–18)
IMM GRANULOCYTES # BLD AUTO: 0.01 K/UL (ref 0–0.04)
IMM GRANULOCYTES NFR BLD AUTO: 0.3 % (ref 0–0.5)
LYMPHOCYTES # BLD AUTO: 1.6 K/UL (ref 1–4.8)
LYMPHOCYTES NFR BLD: 40.1 % (ref 18–48)
MCH RBC QN AUTO: 29.1 PG (ref 27–31)
MCHC RBC AUTO-ENTMCNC: 31 G/DL (ref 32–36)
MCV RBC AUTO: 94 FL (ref 82–98)
MONOCYTES # BLD AUTO: 0.4 K/UL (ref 0.3–1)
MONOCYTES NFR BLD: 8.8 % (ref 4–15)
NEUTROPHILS # BLD AUTO: 1.9 K/UL (ref 1.8–7.7)
NEUTROPHILS NFR BLD: 47.8 % (ref 38–73)
NRBC BLD-RTO: 0 /100 WBC
PLATELET # BLD AUTO: 210 K/UL (ref 150–450)
PMV BLD AUTO: 11.7 FL (ref 9.2–12.9)
POTASSIUM SERPL-SCNC: 4 MMOL/L (ref 3.5–5.1)
PROT SERPL-MCNC: 7.5 G/DL (ref 6–8.4)
RBC # BLD AUTO: 4.36 M/UL (ref 4.6–6.2)
SODIUM SERPL-SCNC: 142 MMOL/L (ref 136–145)
WBC # BLD AUTO: 3.99 K/UL (ref 3.9–12.7)

## 2022-10-12 PROCEDURE — 11042 DBRDMT SUBQ TIS 1ST 20SQCM/<: CPT | Mod: PBBFAC | Performed by: PODIATRIST

## 2022-10-12 PROCEDURE — 99214 OFFICE O/P EST MOD 30 MIN: CPT | Mod: PBBFAC,25 | Performed by: PODIATRIST

## 2022-10-12 PROCEDURE — 87186 SC STD MICRODIL/AGAR DIL: CPT | Performed by: PODIATRIST

## 2022-10-12 PROCEDURE — 87076 CULTURE ANAEROBE IDENT EACH: CPT | Performed by: PODIATRIST

## 2022-10-12 PROCEDURE — 11042 PR DEBRIDEMENT, SKIN, SUB-Q TISSUE,=<20 SQ CM: ICD-10-PCS | Mod: S$PBB,,, | Performed by: PODIATRIST

## 2022-10-12 PROCEDURE — 11042 DBRDMT SUBQ TIS 1ST 20SQCM/<: CPT | Mod: S$PBB,,, | Performed by: PODIATRIST

## 2022-10-12 PROCEDURE — 29580 STRAPPING UNNA BOOT: CPT | Mod: PBBFAC | Performed by: PODIATRIST

## 2022-10-12 PROCEDURE — 87075 CULTR BACTERIA EXCEPT BLOOD: CPT | Performed by: PODIATRIST

## 2022-10-12 PROCEDURE — 99214 PR OFFICE/OUTPT VISIT, EST, LEVL IV, 30-39 MIN: ICD-10-PCS | Mod: 25,S$PBB,, | Performed by: PODIATRIST

## 2022-10-12 PROCEDURE — 85025 COMPLETE CBC W/AUTO DIFF WBC: CPT | Performed by: INTERNAL MEDICINE

## 2022-10-12 PROCEDURE — 80053 COMPREHEN METABOLIC PANEL: CPT | Performed by: INTERNAL MEDICINE

## 2022-10-12 PROCEDURE — 20220 BONE BIOPSY TROCAR/NDL SUPFC: CPT | Mod: 51,S$PBB,, | Performed by: PODIATRIST

## 2022-10-12 PROCEDURE — 88311 DECALCIFY TISSUE: CPT | Performed by: PATHOLOGY

## 2022-10-12 PROCEDURE — 99214 OFFICE O/P EST MOD 30 MIN: CPT | Mod: 25,S$PBB,, | Performed by: PODIATRIST

## 2022-10-12 PROCEDURE — 88307 TISSUE EXAM BY PATHOLOGIST: CPT | Mod: 26,,, | Performed by: PATHOLOGY

## 2022-10-12 PROCEDURE — 99999 PR PBB SHADOW E&M-EST. PATIENT-LVL IV: CPT | Mod: PBBFAC,,, | Performed by: PODIATRIST

## 2022-10-12 PROCEDURE — 20220 PR BONE BIOPSY,TROCAR/NEEDLE SUPERF: ICD-10-PCS | Mod: 51,S$PBB,, | Performed by: PODIATRIST

## 2022-10-12 PROCEDURE — 20220 BONE BIOPSY TROCAR/NDL SUPFC: CPT | Mod: PBBFAC | Performed by: PODIATRIST

## 2022-10-12 PROCEDURE — 86140 C-REACTIVE PROTEIN: CPT | Performed by: INTERNAL MEDICINE

## 2022-10-12 PROCEDURE — 88311 PR  DECALCIFY TISSUE: ICD-10-PCS | Mod: 26,,, | Performed by: PATHOLOGY

## 2022-10-12 PROCEDURE — 88311 DECALCIFY TISSUE: CPT | Mod: 26,,, | Performed by: PATHOLOGY

## 2022-10-12 PROCEDURE — 87077 CULTURE AEROBIC IDENTIFY: CPT | Performed by: PODIATRIST

## 2022-10-12 PROCEDURE — 88307 TISSUE EXAM BY PATHOLOGIST: CPT | Performed by: PATHOLOGY

## 2022-10-12 PROCEDURE — 99999 PR PBB SHADOW E&M-EST. PATIENT-LVL IV: ICD-10-PCS | Mod: PBBFAC,,, | Performed by: PODIATRIST

## 2022-10-12 PROCEDURE — 88307 PR  SURG PATH,LEVEL V: ICD-10-PCS | Mod: 26,,, | Performed by: PATHOLOGY

## 2022-10-12 PROCEDURE — 87070 CULTURE OTHR SPECIMN AEROBIC: CPT | Performed by: PODIATRIST

## 2022-10-12 PROCEDURE — 36415 COLL VENOUS BLD VENIPUNCTURE: CPT | Performed by: INTERNAL MEDICINE

## 2022-10-12 NOTE — PROGRESS NOTES
Subjective:      Patient ID: Indio Ryder Jr. is a 52 y.o. male.    Chief Complaint: Wound Care (BILATERAL FOOT ULCER ) and Dressing Change (Football -- UNNA BOOT )    Indio is a 52 y.o. male who presents to the clinic for evaluation and treatment of high risk feet. 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), Colon adenocarcinoma (04/12/2022), Diabetic ketoacidosis without coma associated with type 2 diabetes mellitus (05/30/2017), Diabetic ulcer of right foot associated with type 2 diabetes mellitus (06/03/2015), Disorder of kidney and ureter, Essential hypertension (06/06/2013), Group B streptococcal infection (01/13/2017), Mixed hyperlipidemia (08/12/2014), Septic arthritis of left foot (04/28/2021), Shoulder impingement (08/12/2014), Subacute osteomyelitis of right foot (01/12/2017), Traumatic amputation of fifth toe of right foot (07/02/2015), and Type 2 diabetes mellitus with diabetic neuropathy, with long-term current use of insulin (05/03/2016). The patient's chief complaint is diabetic foot ulcer, b/l feet. Currently managed by Dr Peterson . This patient has documented high risk feet requiring routine maintenance secondary to diabetes mellitis and those secondary complications of diabetes, as mentioned..    PCP: Lorena Thakur MD    Date Last Seen by PCP:   Chief Complaint   Patient presents with    Wound Care     BILATERAL FOOT ULCER     Dressing Change     Football -- UNNA BOOT      Hemoglobin A1C   Date Value Ref Range Status   07/01/2022 13.3 (H) 4.0 - 5.6 % Final     Comment:     ADA Screening Guidelines:  5.7-6.4%  Consistent with prediabetes  >or=6.5%  Consistent with diabetes    High levels of fetal hemoglobin interfere with the HbA1C  assay. Heterozygous hemoglobin variants (HbS, HgC, etc)do  not significantly interfere with this assay.   However, presence of multiple variants may affect accuracy.     09/15/2021 8.1 (H) 4.0 - 5.6 % Final     Comment:     ADA  Screening Guidelines:  5.7-6.4%  Consistent with prediabetes  >or=6.5%  Consistent with diabetes    High levels of fetal hemoglobin interfere with the HbA1C  assay. Heterozygous hemoglobin variants (HbS, HgC, etc)do  not significantly interfere with this assay.   However, presence of multiple variants may affect accuracy.     07/14/2021 10.1 (H) 4.0 - 5.6 % Final     Comment:     ADA Screening Guidelines:  5.7-6.4%  Consistent with prediabetes  >or=6.5%  Consistent with diabetes    High levels of fetal hemoglobin interfere with the HbA1C  assay. Heterozygous hemoglobin variants (HbS, HgC, etc)do  not significantly interfere with this assay.   However, presence of multiple variants may affect accuracy.         Review of Systems   Constitutional: Negative for chills, decreased appetite and fever.   Cardiovascular:  Positive for leg swelling.   Skin:  Positive for color change, dry skin, nail changes and poor wound healing.   Musculoskeletal:  Negative for arthritis, back pain, joint pain, joint swelling and myalgias.   Gastrointestinal:  Negative for nausea and vomiting.   Neurological:  Positive for numbness, paresthesias and sensory change. Negative for loss of balance.         Patient Active Problem List   Diagnosis    Essential hypertension    Mixed hyperlipidemia    Traumatic amputation of fifth toe of right foot    Type 2 diabetes mellitus with hyperglycemia, with long-term current use of insulin    Actinomyces infection    Hypokalemia    Neck pain    Diabetic ulcer of right midfoot associated with diabetes mellitus due to underlying condition, with fat layer exposed    Severe malnutrition    Hypertensive retinopathy, bilateral    Allergic reaction due to antibacterial drug    Chronic left shoulder pain    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 edema, with long-term current use of insulin    Diabetic ulcer of left foot     "Septic arthritis of left foot    Acute on chronic osteomyelitis    Colon adenocarcinoma    Diabetic ulcer of right foot associated with type 2 diabetes mellitus, with fat layer exposed       Current Outpatient Medications on File Prior to Visit   Medication Sig Dispense Refill    acetaminophen (TYLENOL) 325 MG tablet Take 2 tablets (650 mg total) by mouth every 6 (six) hours as needed. 30 tablet 0    atorvastatin (LIPITOR) 80 MG tablet Take 1 tablet (80 mg total) by mouth once daily. 90 tablet 3    diphenoxylate-atropine 2.5-0.025 mg (LOMOTIL) 2.5-0.025 mg per tablet Take 1 to 2 tablets by mouth three times daily as needed for diarrhea 40 tablet 0    insulin aspart U-100 (NOVOLOG) 100 unit/mL (3 mL) InPn pen Inject 8 units before meals plus scale 150-200+2, 201-250+4, 251-300+6, 301-350+8, >350+10. Max daily 54 units. 15 mL 6    insulin detemir U-100 (LEVEMIR FLEXTOUCH) 100 unit/mL (3 mL) SubQ InPn pen Inject 26 Units into the skin every evening. 15 mL 6    linezolid (ZYVOX) 600 mg Tab Take 1 tablet (600 mg total) by mouth every 12 (twelve) hours. 60 tablet 0    metFORMIN (GLUCOPHAGE-XR) 500 MG ER 24hr tablet Take 1 to 2 tablets by mouth twice daily 360 tablet 1    ondansetron (ZOFRAN-ODT) 8 MG TbDL Dissolve 1 tablet (8 mg total) by mouth every 8 (eight) hours as needed (Nausea). 21 tablet 0    ONETOUCH DELICA LANCETS 33 gauge Misc       ONETOUCH ULTRA2 kit       pantoprazole (PROTONIX) 40 MG tablet Take 1 tablet (40 mg total) by mouth once daily. 30 tablet 11    pen needle, diabetic (BD SASCHA 2ND GEN PEN NEEDLE) 32 gauge x 5/32" Ndle Use 4 times a day (Patient taking differently: Use 4 times a day) 400 each 3    permethrin (ELIMITE) 5 % cream Apply neck down at night for 1 application. Wash off in morning and Repeat in 1 week 60 g 1    semaglutide (OZEMPIC) 0.25 mg or 0.5 mg(2 mg/1.5 mL) pen injector Inject 0.25 mg into the skin every 7 days for 30 days, THEN 0.5 mg every 7 days. 3 pen 0    " tamsulosin (FLOMAX) 0.4 mg Cap Take 2 capsules (0.8 mg total) by mouth once daily. for 14 days 28 capsule 0     Current Facility-Administered Medications on File Prior to Visit   Medication Dose Route Frequency Provider Last Rate Last Admin    heparin, porcine (PF) 100 unit/mL injection flush 10 Units  10 Units Intravenous PRN Esthela Diggs MD   500 Units at 05/19/21 1308       Review of patient's allergies indicates:   Allergen Reactions    Non-aspirin severe allergy        Past Surgical History:   Procedure Laterality Date    COLONOSCOPY Right 1/19/2022    Procedure: COLONOSCOPY;  Surgeon: Tj aHile MD;  Location: [facility] ENDO (4TH FLR);  Service: Endoscopy;  Laterality: Right;  previous order un-usable/poor prep 1/18/22  RAPID COVID test arrival 12:20  instructions handed to pt- sm    COLONOSCOPY N/A 3/21/2022    Procedure: COLONOSCOPY;  Surgeon: Sheng Haque MD;  Location: [facility] ENDO (2ND FLR);  Service: Endoscopy;  Laterality: N/A;  Colonoscopy with AES for hepatix flexure polyp removal, 2nd floor  Pt is fully vaccinated-DS  Pt prescribed Plavix by Dr. Stahl in Jan. 2022, however pt states that he never started taking it-DS  3/16/22-Instructions sent via portal-DS    DEBRIDEMENT OF FOOT Left 7/14/2019    Procedure: DEBRIDEMENT, FOOT, with left 5th ray amputation;  Surgeon: Sis Hickman DPM;  Location: [facility] OR 2ND FLR;  Service: Podiatry;  Laterality: Left;    DEBRIDEMENT OF FOOT Left 7/17/2019    Procedure: DEBRIDEMENT, FOOT with leftt 5th ray partial amputation with Neox Graft;  Surgeon: Mai Burrell DPM;  Location: [facility] OR 2ND FLR;  Service: Podiatry;  Laterality: Left;  t    DEBRIDEMENT OF FOOT Bilateral 4/29/2021    Procedure: DEBRIDEMENT, FOOT;  Surgeon: Mai Burrell DPM;  Location: [facility] OR 1ST FLR;  Service: Podiatry;  Laterality: Bilateral;  Graft application    TOE AMPUTATION Right 06/05/2015    TOE AMPUTATION Right 01/19/2017    XI ROBOTIC COLECTOMY, RIGHT  "N/A 4/25/2022    Procedure: XI ROBOTIC COLECTOMY, RIGHT (lithotomy, eras low);  Surgeon: Erik Stout MD;  Location: [facility] OR 2ND FLR;  Service: Colon and Rectal;  Laterality: N/A;  Paruch to [place]win    XI ROBOTIC COLECTOMY, RIGHT  4/25/2022    Procedure: ;  Surgeon: Erik Stout MD;  Location: [facility] OR 2ND FLR;  Service: Colon and Rectal;;       Family History   Adopted: Yes   Problem Relation Age of Onset    Hypertension Mother     Cancer Mother         breast    Diabetes Mother     Hypertension Father     Cataracts Father     Heart disease Father         mi    Diabetes Sister     No Known Problems Brother     Heart disease Sister         MI    No Known Problems Sister     Hypertension Maternal Aunt     No Known Problems Maternal Uncle     No Known Problems Paternal Aunt     No Known Problems Paternal Uncle     No Known Problems Maternal Grandmother     No Known Problems Maternal Grandfather     No Known Problems Paternal Grandmother     No Known Problems Paternal Grandfather     Amblyopia Neg Hx     Blindness Neg Hx     Glaucoma Neg Hx     Macular degeneration Neg Hx     Retinal detachment Neg Hx     Strabismus Neg Hx     Stroke Neg Hx     Thyroid disease Neg Hx        Social History     Socioeconomic History    Marital status: Single    Number of children: 0   Occupational History    Occupation: Retired     Employer: [employer]   Tobacco Use    Smoking status: Never    Smokeless tobacco: Never   Substance and Sexual Activity    Alcohol use: No    Drug use: No    Sexual activity: Yes     Partners: Female     Birth control/protection: Condom               Objective:      Vitals:    10/12/22 0900   Resp: 18   Weight: 109.8 kg (242 lb)   Height: 6' 1" (1.854 m)   PainSc: 0-No pain        Physical Exam  Constitutional:       Appearance: He is well-developed.   Cardiovascular:      Comments: Dorsalis pedis and posterior tibial pulses are palpable Skin is atrophic, " slightly hyperpigmented, and mildly edematous      Musculoskeletal:         General: No tenderness. Normal range of motion.      Comments: Adequate joint range of motion without pain, limitation, nor crepitation Bilateral feet and ankle joints. Muscle strength is 5/5 in all groups bilaterally.  S/p partial 5th ray amp b/l       Skin:     General: Skin is warm and dry.      Coloration: Skin is ashen. Skin is not jaundiced, mottled or sallow.      Findings: No abscess, ecchymosis, erythema or lesion.      Comments:        Neurological:      Mental Status: He is alert and oriented to person, place, and time.      Comments: [place] 5.07 monofilamant testing is diminished Kenji feet. Sharp/dull sensation diminished Bilaterally. Light touch absent Bilaterally.       Psychiatric:         Behavior: Behavior normal.               Ulcer location:  left, plantar foot   Pre debridement Measurements: 0.5x0.3x0.1 cm   Post debridement Measurements : 0.6x0.3x0.1 cm   Signs of infection: No  Erythema: No  Undermining: No  Tunneling: No  Drainage: Serous  Periwound skin: intact  Wound Base: granular    Ulcer location:  right, plantar foot   Pre debridement Measurements: 1.3x0.8x0.7 cm   Post debridement Measurements : 1.5x0.9x0.8 cm   Signs of infection: Yes--> deep probe   Erythema: No  Undermining: No  Tunneling: Yes  Drainage: Bloody  Periwound skin: intact  Wound Base: granular        Assessment:       Encounter Diagnoses   Name Primary?    Diabetic ulcer of right midfoot associated with type 2 diabetes mellitus, with muscle involvement without evidence of necrosis Yes    Acute osteomyelitis of metatarsal bone of right foot     Diabetic ulcer of right midfoot associated with diabetes mellitus due to underlying condition, limited to breakdown of skin          Plan:       Indio was seen today for wound care and dressing change.    Diagnoses and all orders for this visit:    Diabetic ulcer of right midfoot associated  with type 2 diabetes mellitus, with muscle involvement without evidence of necrosis    Acute osteomyelitis of metatarsal bone of right foot    Diabetic ulcer of right midfoot associated with diabetes mellitus due to underlying condition, limited to breakdown of skin  -     Aerobic culture  -     Culture, Anaerobic  -     Specimen to Pathology Other      I counseled the patient on his conditions, their implications and medical management.    B/l chronic foot ulcerations   Bone bx obtained R foot --> see procedure note   L foot stable     Debridement: With verbal consent, nonviable tissues on the bilateral foot were debrided beyond sub q utilizing a  sterile No. 3 scalpel and forceps. Minimal bleeding controlled with direct pressure  The patient tolerated this well.     Dressings: [place]  Offloading:Alfredo Gonzáles MA assisted w/ application of football dressing under my direct supervision. Darco shoe applied to offload the area. Advised patient that this should be worn on the affected foot at all times when ambulating     With patient's permission, under my direct supervision Theodora Ritchie applied an Unna boot dressing (bi-layered pink Coflex brand)  using a spiral technique beginning with the foam layer followed by the cohesive bandage avoiding creases or folds.  The wrap was started behind the first metatarsal and ended below the tibial tubercle of the knee.  There was overlap of each turn half the width of the previous turn in order to better control edema. Patient tolerated well and related comfort to the b/l  lower extremity .     Follow-up:Patient is to return to the clinic in 1 week  for follow-up but should call [facility] immediately if any signs of infection, such as fever, chills, sweats, increased redness or pain.    Short-term goals include maintaining good offloading and minimizing bioburden, promoting granulation and epithelialization to healing.  Long-term goals include keeping the wound healed by  good offloading and medical management under the direction of internist.          Bone Biopsy  Procedure Note          Indication/Diagnosis: suspected r foot om     Consent Type:  Patient gives verbal consent    Time Out Completed: yes    Aseptic Technique: Betadine    Anesthesia: None    Instrumentation: Jamshidi Trochar    Procedure Site: R plantar foot     Complications: none    EBL : 1cc    Specimen : yes bone Micro/Path            .

## 2022-10-15 LAB — BACTERIA SPEC AEROBE CULT: ABNORMAL

## 2022-10-17 ENCOUNTER — TELEPHONE (OUTPATIENT)
Dept: INFECTIOUS DISEASES | Facility: CLINIC | Age: 52
End: 2022-10-17
Payer: MEDICARE

## 2022-10-17 ENCOUNTER — OFFICE VISIT (OUTPATIENT)
Dept: PODIATRY | Facility: CLINIC | Age: 52
End: 2022-10-17
Payer: MEDICARE

## 2022-10-17 VITALS — HEIGHT: 73 IN | BODY MASS INDEX: 32.08 KG/M2 | WEIGHT: 242.06 LBS

## 2022-10-17 DIAGNOSIS — E11.621 DIABETIC ULCER OF LEFT MIDFOOT ASSOCIATED WITH TYPE 2 DIABETES MELLITUS, WITH FAT LAYER EXPOSED: ICD-10-CM

## 2022-10-17 DIAGNOSIS — E11.621 DIABETIC ULCER OF RIGHT MIDFOOT ASSOCIATED WITH TYPE 2 DIABETES MELLITUS, WITH MUSCLE INVOLVEMENT WITHOUT EVIDENCE OF NECROSIS: Primary | ICD-10-CM

## 2022-10-17 DIAGNOSIS — E08.621 DIABETIC ULCER OF RIGHT MIDFOOT ASSOCIATED WITH DIABETES MELLITUS DUE TO UNDERLYING CONDITION, LIMITED TO BREAKDOWN OF SKIN: Primary | ICD-10-CM

## 2022-10-17 DIAGNOSIS — L97.411 DIABETIC ULCER OF RIGHT MIDFOOT ASSOCIATED WITH DIABETES MELLITUS DUE TO UNDERLYING CONDITION, LIMITED TO BREAKDOWN OF SKIN: Primary | ICD-10-CM

## 2022-10-17 DIAGNOSIS — M86.171 ACUTE OSTEOMYELITIS OF METATARSAL BONE OF RIGHT FOOT: ICD-10-CM

## 2022-10-17 DIAGNOSIS — L97.422 DIABETIC ULCER OF LEFT MIDFOOT ASSOCIATED WITH TYPE 2 DIABETES MELLITUS, WITH FAT LAYER EXPOSED: ICD-10-CM

## 2022-10-17 DIAGNOSIS — L97.415 DIABETIC ULCER OF RIGHT MIDFOOT ASSOCIATED WITH TYPE 2 DIABETES MELLITUS, WITH MUSCLE INVOLVEMENT WITHOUT EVIDENCE OF NECROSIS: Primary | ICD-10-CM

## 2022-10-17 DIAGNOSIS — E11.49 TYPE II DIABETES MELLITUS WITH NEUROLOGICAL MANIFESTATIONS: ICD-10-CM

## 2022-10-17 LAB — BACTERIA SPEC ANAEROBE CULT: NORMAL

## 2022-10-17 PROCEDURE — 99499 NO LOS: ICD-10-PCS | Mod: S$PBB,,, | Performed by: PODIATRIST

## 2022-10-17 PROCEDURE — 99499 UNLISTED E&M SERVICE: CPT | Mod: S$PBB,,, | Performed by: PODIATRIST

## 2022-10-17 PROCEDURE — 11042 DBRDMT SUBQ TIS 1ST 20SQCM/<: CPT | Mod: PBBFAC | Performed by: PODIATRIST

## 2022-10-17 PROCEDURE — 99213 OFFICE O/P EST LOW 20 MIN: CPT | Mod: PBBFAC,25 | Performed by: PODIATRIST

## 2022-10-17 PROCEDURE — 11042 PR DEBRIDEMENT, SKIN, SUB-Q TISSUE,=<20 SQ CM: ICD-10-PCS | Mod: S$PBB,,, | Performed by: PODIATRIST

## 2022-10-17 PROCEDURE — 99999 PR PBB SHADOW E&M-EST. PATIENT-LVL III: ICD-10-PCS | Mod: PBBFAC,,, | Performed by: PODIATRIST

## 2022-10-17 PROCEDURE — 99999 PR PBB SHADOW E&M-EST. PATIENT-LVL III: CPT | Mod: PBBFAC,,, | Performed by: PODIATRIST

## 2022-10-17 PROCEDURE — 11042 DBRDMT SUBQ TIS 1ST 20SQCM/<: CPT | Mod: S$PBB,,, | Performed by: PODIATRIST

## 2022-10-17 RX ORDER — HEPARIN 100 UNIT/ML
500 SYRINGE INTRAVENOUS
Status: CANCELLED | OUTPATIENT
Start: 2022-10-17

## 2022-10-17 RX ORDER — SODIUM CHLORIDE 0.9 % (FLUSH) 0.9 %
10 SYRINGE (ML) INJECTION
Status: CANCELLED | OUTPATIENT
Start: 2022-10-17

## 2022-10-17 NOTE — TELEPHONE ENCOUNTER
OUTPATIENT IV ANTIBIOTICS   Infusion company:[facility]    Infection:Right foot bone cultures  Antibiotics:meropenem 2g IV q8 hours  EOC: 6 weeks    ID Clinic FU:  EOC follow up:  Review until follow up: Elva    Reviewer notes:        Called pt to inform him:  DC linezolid  PICC place through IR  IV antibiotics- [facility]  He verbalized understanding

## 2022-10-17 NOTE — PROGRESS NOTES
Klebsiella pneumoniae esbl     CULTURE, AEROBIC  (SPECIFY SOURCE)     Amikacin <=16 mcg/mL Sensitive     Amox/K Clav'ate >16/8 mcg/mL Resistant     Amp/Sulbactam >16/8 mcg/mL Resistant     Ampicillin >16 mcg/mL Resistant     Cefazolin >16 mcg/mL Resistant     Cefepime >16 mcg/mL Resistant     Ceftriaxone >32 mcg/mL Resistant     Ciprofloxacin >2 mcg/mL Resistant     Ertapenem 1 mcg/mL Intermediate     Gentamicin >8 mcg/mL Resistant     Levofloxacin >4 mcg/mL Resistant     Meropenem <=1 mcg/mL Sensitive     Piperacillin/Tazo <=16 mcg/mL Resistant     Tetracycline >8 mcg/mL Resistant     Tobramycin >8 mcg/mL Resistant     Trimeth/Sulfa >2/38 mcg/mL Resistant      Right foot bone cultures    Will discontinue linezolid  Place PICC line  Start meropenem 2g IV q8 hours  Qweekly CBC with diff, CMP, CRP  Plan for 6 week course

## 2022-10-18 ENCOUNTER — PATIENT OUTREACH (OUTPATIENT)
Dept: ADMINISTRATIVE | Facility: HOSPITAL | Age: 52
End: 2022-10-18
Payer: MEDICARE

## 2022-10-18 NOTE — PROGRESS NOTES
Received message via [facility] in box that patient would like to schedule colonoscopy. Patient has referral to endo  for colonoscopy already entered in chart

## 2022-10-20 ENCOUNTER — OFFICE VISIT (OUTPATIENT)
Dept: PODIATRY | Facility: CLINIC | Age: 52
End: 2022-10-20
Payer: MEDICARE

## 2022-10-20 VITALS — RESPIRATION RATE: 18 BRPM | HEIGHT: 73 IN | WEIGHT: 242 LBS | BODY MASS INDEX: 32.07 KG/M2

## 2022-10-20 DIAGNOSIS — E11.621 DIABETIC ULCER OF LEFT MIDFOOT ASSOCIATED WITH TYPE 2 DIABETES MELLITUS, WITH FAT LAYER EXPOSED: ICD-10-CM

## 2022-10-20 DIAGNOSIS — M86.171 ACUTE OSTEOMYELITIS OF METATARSAL BONE OF RIGHT FOOT: ICD-10-CM

## 2022-10-20 DIAGNOSIS — L97.415 DIABETIC ULCER OF RIGHT MIDFOOT ASSOCIATED WITH TYPE 2 DIABETES MELLITUS, WITH MUSCLE INVOLVEMENT WITHOUT EVIDENCE OF NECROSIS: Primary | ICD-10-CM

## 2022-10-20 DIAGNOSIS — E11.621 DIABETIC ULCER OF RIGHT MIDFOOT ASSOCIATED WITH TYPE 2 DIABETES MELLITUS, WITH MUSCLE INVOLVEMENT WITHOUT EVIDENCE OF NECROSIS: Primary | ICD-10-CM

## 2022-10-20 DIAGNOSIS — L97.422 DIABETIC ULCER OF LEFT MIDFOOT ASSOCIATED WITH TYPE 2 DIABETES MELLITUS, WITH FAT LAYER EXPOSED: ICD-10-CM

## 2022-10-20 LAB
FINAL PATHOLOGIC DIAGNOSIS: NORMAL
Lab: NORMAL

## 2022-10-20 PROCEDURE — 99999 PR PBB SHADOW E&M-EST. PATIENT-LVL III: CPT | Mod: PBBFAC,,, | Performed by: PODIATRIST

## 2022-10-20 PROCEDURE — 11043 PR DEBRIDEMENT, SKIN, SUB-Q TISSUE,MUSCLE,=<20 SQ CM: ICD-10-PCS | Mod: S$PBB,,, | Performed by: PODIATRIST

## 2022-10-20 PROCEDURE — 99499 NO LOS: ICD-10-PCS | Mod: S$PBB,,, | Performed by: PODIATRIST

## 2022-10-20 PROCEDURE — 11043 DBRDMT MUSC&/FSCA 1ST 20/<: CPT | Mod: PBBFAC | Performed by: PODIATRIST

## 2022-10-20 PROCEDURE — 99213 OFFICE O/P EST LOW 20 MIN: CPT | Mod: PBBFAC,25 | Performed by: PODIATRIST

## 2022-10-20 PROCEDURE — 99999 PR PBB SHADOW E&M-EST. PATIENT-LVL III: ICD-10-PCS | Mod: PBBFAC,,, | Performed by: PODIATRIST

## 2022-10-20 PROCEDURE — 11043 DBRDMT MUSC&/FSCA 1ST 20/<: CPT | Mod: S$PBB,,, | Performed by: PODIATRIST

## 2022-10-20 PROCEDURE — 99499 UNLISTED E&M SERVICE: CPT | Mod: S$PBB,,, | Performed by: PODIATRIST

## 2022-10-20 PROCEDURE — 29580 STRAPPING UNNA BOOT: CPT | Mod: 59,PBBFAC | Performed by: PODIATRIST

## 2022-10-24 ENCOUNTER — HOSPITAL ENCOUNTER (OUTPATIENT)
Dept: INTERVENTIONAL RADIOLOGY/VASCULAR | Facility: HOSPITAL | Age: 52
Discharge: HOME | End: 2022-10-24
Attending: INTERNAL MEDICINE
Payer: MEDICARE

## 2022-10-24 ENCOUNTER — HOSPITAL ENCOUNTER (OUTPATIENT)
Dept: RADIOLOGY | Facility: HOSPITAL | Age: 52
Discharge: HOME | End: 2022-10-24
Attending: INTERNAL MEDICINE
Payer: MEDICARE

## 2022-10-24 VITALS
SYSTOLIC BLOOD PRESSURE: 180 MMHG | TEMPERATURE: 98 F | HEART RATE: 93 BPM | RESPIRATION RATE: 18 BRPM | WEIGHT: 230 LBS | BODY MASS INDEX: 30.48 KG/M2 | DIASTOLIC BLOOD PRESSURE: 99 MMHG | HEIGHT: 73 IN | OXYGEN SATURATION: 98 %

## 2022-10-24 DIAGNOSIS — Z45.2 ENCOUNTER FOR ASSESSMENT OF PERIPHERALLY INSERTED CENTRAL CATHETER (PICC): ICD-10-CM

## 2022-10-24 DIAGNOSIS — E08.621 DIABETIC ULCER OF RIGHT MIDFOOT ASSOCIATED WITH DIABETES MELLITUS DUE TO UNDERLYING CONDITION, LIMITED TO BREAKDOWN OF SKIN: ICD-10-CM

## 2022-10-24 DIAGNOSIS — Z45.2 ENCOUNTER FOR ASSESSMENT OF PERIPHERALLY INSERTED CENTRAL CATHETER (PICC): Primary | ICD-10-CM

## 2022-10-24 DIAGNOSIS — L97.411 DIABETIC ULCER OF RIGHT MIDFOOT ASSOCIATED WITH DIABETES MELLITUS DUE TO UNDERLYING CONDITION, LIMITED TO BREAKDOWN OF SKIN: ICD-10-CM

## 2022-10-24 PROCEDURE — C1751 CATH, INF, PER/CENT/MIDLINE: HCPCS

## 2022-10-24 PROCEDURE — 71045 X-RAY EXAM CHEST 1 VIEW: CPT | Mod: TC,FY

## 2022-10-24 PROCEDURE — 71045 X-RAY EXAM CHEST 1 VIEW: CPT | Mod: 26,,, | Performed by: RADIOLOGY

## 2022-10-24 PROCEDURE — 71045 XR CHEST AP PORTABLE: ICD-10-PCS | Mod: 26,,, | Performed by: RADIOLOGY

## 2022-10-24 RX ORDER — SODIUM CHLORIDE 0.9 % (FLUSH) 0.9 %
10 SYRINGE (ML) INJECTION
Status: DISCONTINUED | OUTPATIENT
Start: 2022-10-24 | End: 2022-10-25 | Stop reason: HOSPADM

## 2022-10-24 RX ORDER — SODIUM CHLORIDE 0.9 % (FLUSH) 0.9 %
10 SYRINGE (ML) INJECTION EVERY 6 HOURS
Status: DISCONTINUED | OUTPATIENT
Start: 2022-10-24 | End: 2022-10-25 | Stop reason: HOSPADM

## 2022-10-24 NOTE — PROGRESS NOTES
Subjective:      Patient ID: Indio Ryder Jr. is a 52 y.o. male.    Chief Complaint: No chief complaint on file.    Indio is a 52 y.o. male who presents to the clinic for evaluation and treatment of high risk feet. 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), Colon adenocarcinoma (04/12/2022), Diabetic ketoacidosis without coma associated with type 2 diabetes mellitus (05/30/2017), Diabetic ulcer of right foot associated with type 2 diabetes mellitus (06/03/2015), Disorder of kidney and ureter, Essential hypertension (06/06/2013), Group B streptococcal infection (01/13/2017), Mixed hyperlipidemia (08/12/2014), Septic arthritis of left foot (04/28/2021), Shoulder impingement (08/12/2014), Subacute osteomyelitis of right foot (01/12/2017), Traumatic amputation of fifth toe of right foot (07/02/2015), and Type 2 diabetes mellitus with diabetic neuropathy, with long-term current use of insulin (05/03/2016). The patient's chief complaint is diabetic foot ulcer, b/l feet. Currently managed by Dr Peterson . This patient has documented high risk feet requiring routine maintenance secondary to diabetes mellitis and those secondary complications of diabetes, as mentioned..      10/17/2022 returns to clinic for follow up of bilateral foot wounds.  He is s/p right forefoot trocar bone biopsy per my colleague.  He has kept dressings clean, dry, intact.  No new pedal complaints. Linezolid 600 mg PO BID per ID      PCP: Lorena Thakur MD    Date Last Seen by PCP:   No chief complaint on file.    Hemoglobin A1C   Date Value Ref Range Status   07/01/2022 13.3 (H) 4.0 - 5.6 % Final     Comment:     ADA Screening Guidelines:  5.7-6.4%  Consistent with prediabetes  >or=6.5%  Consistent with diabetes    High levels of fetal hemoglobin interfere with the HbA1C  assay. Heterozygous hemoglobin variants (HbS, HgC, etc)do  not significantly interfere with this assay.   However, presence of multiple variants  may affect accuracy.     09/15/2021 8.1 (H) 4.0 - 5.6 % Final     Comment:     ADA Screening Guidelines:  5.7-6.4%  Consistent with prediabetes  >or=6.5%  Consistent with diabetes    High levels of fetal hemoglobin interfere with the HbA1C  assay. Heterozygous hemoglobin variants (HbS, HgC, etc)do  not significantly interfere with this assay.   However, presence of multiple variants may affect accuracy.     07/14/2021 10.1 (H) 4.0 - 5.6 % Final     Comment:     ADA Screening Guidelines:  5.7-6.4%  Consistent with prediabetes  >or=6.5%  Consistent with diabetes    High levels of fetal hemoglobin interfere with the HbA1C  assay. Heterozygous hemoglobin variants (HbS, HgC, etc)do  not significantly interfere with this assay.   However, presence of multiple variants may affect accuracy.         Review of Systems   Constitutional: Negative for chills, decreased appetite and fever.   Cardiovascular:  Positive for leg swelling.   Skin:  Positive for color change, dry skin, nail changes and poor wound healing.   Musculoskeletal:  Negative for arthritis, back pain, joint pain, joint swelling and myalgias.   Gastrointestinal:  Negative for nausea and vomiting.   Neurological:  Positive for numbness, paresthesias and sensory change. Negative for loss of balance.         Patient Active Problem List   Diagnosis    Essential hypertension    Mixed hyperlipidemia    Traumatic amputation of fifth toe of right foot    Type 2 diabetes mellitus with hyperglycemia, with long-term current use of insulin    Actinomyces infection    Hypokalemia    Neck pain    Diabetic ulcer of right midfoot associated with diabetes mellitus due to underlying condition, with fat layer exposed    Severe malnutrition    Hypertensive retinopathy, bilateral    Allergic reaction due to antibacterial drug    Chronic left shoulder pain    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 30 tablet 0    atorvastatin (LIPITOR) 80 MG tablet Take 1 tablet (80 mg total) by mouth once daily. 90 tablet 3    diphenoxylate-atropine 2.5-0.025 mg (LOMOTIL) 2.5-0.025 mg per tablet Take 1 to 2 tablets by mouth three times daily as needed for diarrhea 40 tablet 0    insulin aspart U-100 (NOVOLOG) 100 unit/mL (3 mL) InPn pen Inject 8 units before meals plus scale 150-200+2, 201-250+4, 251-300+6, 301-350+8, >350+10. Max daily 54 units. 15 mL 6    insulin detemir U-100 (LEVEMIR FLEXTOUCH) 100 unit/mL (3 mL) SubQ InPn pen Inject 26 Units into the skin every evening. 15 mL 6    linezolid (ZYVOX) 600 mg Tab Take 1 tablet (600 mg total) by mouth every 12 (twelve) hours. 60 tablet 0    metFORMIN (GLUCOPHAGE-XR) 500 MG ER 24hr tablet Take 1 to 2 tablets by mouth twice daily 360 tablet 1    ondansetron (ZOFRAN-ODT) 8 MG TbDL Dissolve 1 tablet (8 mg total) by mouth every 8 (eight) hours as needed (Nausea). 21 tablet 0    ONETOUCH DELICA LANCETS 33 gauge [facility]       ONETOUCH ULTRA2 kit       pantoprazole (PROTONIX) 40 MG tablet Take 1 tablet (40 mg total) by mouth once daily. 30 tablet 11    pen needle, diabetic (BD SASCHA 2ND GEN PEN NEEDLE) 32 gauge x 5/32" Ndle Use 4 times a day (Patient taking differently: Use 4 times a day) 400 each 3    permethrin (ELIMITE) 5 % cream Apply neck down at night for 1 application. Wash off in morning and Repeat in 1 week 60 g 1    semaglutide (OZEMPIC) 0.25 mg or 0.5 mg(2 mg/1.5 mL) pen injector Inject 0.25 mg into the skin every 7 days for 30 " days, THEN 0.5 mg every 7 days. 3 pen 0    tamsulosin (FLOMAX) 0.4 mg Cap Take 2 capsules (0.8 mg total) by mouth once daily. for 14 days 28 capsule 0     Current Facility-Administered Medications on File Prior to Visit   Medication Dose Route Frequency Provider Last Rate Last Admin    heparin, porcine (PF) 100 unit/mL injection flush 10 Units  10 Units Intravenous PRN Esthela Diggs MD   500 Units at 05/19/21 1308       Review of patient's allergies indicates:   Allergen Reactions    Non-aspirin severe allergy        Past Surgical History:   Procedure Laterality Date    COLONOSCOPY Right 1/19/2022    Procedure: COLONOSCOPY;  Surgeon: Tj Haile MD;  Location: [facility] ENDO (4TH FLR);  Service: Endoscopy;  Laterality: Right;  previous order un-usable/poor prep 1/18/22  RAPID COVID test arrival 12:20  instructions handed to pt- sm    COLONOSCOPY N/A 3/21/2022    Procedure: COLONOSCOPY;  Surgeon: Sheng Haque MD;  Location: [facility] ENDO (2ND FLR);  Service: Endoscopy;  Laterality: N/A;  Colonoscopy with AES for hepatix flexure polyp removal, 2nd floor  Pt is fully vaccinated-DS  Pt prescribed Plavix by Dr. Stahl in Jan. 2022, however pt states that he never started taking it-DS  3/16/22-Instructions sent via portal-DS    DEBRIDEMENT OF FOOT Left 7/14/2019    Procedure: DEBRIDEMENT, FOOT, with left 5th ray amputation;  Surgeon: Sis Hickman DPM;  Location: [facility] OR 2ND FLR;  Service: Podiatry;  Laterality: Left;    DEBRIDEMENT OF FOOT Left 7/17/2019    Procedure: DEBRIDEMENT, FOOT with leftt 5th ray partial amputation with Neox Graft;  Surgeon: Mai Burrell DPM;  Location: [facility] OR 2ND FLR;  Service: Podiatry;  Laterality: Left;  t    DEBRIDEMENT OF FOOT Bilateral 4/29/2021    Procedure: DEBRIDEMENT, FOOT;  Surgeon: Mai Burrell DPM;  Location: [facility] OR 1ST FLR;  Service: Podiatry;  Laterality: Bilateral;  Graft application    TOE AMPUTATION Right 06/05/2015    TOE AMPUTATION Right  "01/19/2017    XI ROBOTIC COLECTOMY, RIGHT N/A 4/25/2022    Procedure: XI ROBOTIC COLECTOMY, RIGHT (lithotomy, eras low);  Surgeon: Erik Stout MD;  Location: [facility] OR 2ND FLR;  Service: Colon and Rectal;  Laterality: N/A;  Paruch to [place]    XI ROBOTIC COLECTOMY, RIGHT  4/25/2022    Procedure: ;  Surgeon: Erik Stout MD;  Location: [facility] OR 2ND FLR;  Service: Colon and Rectal;;       Family History   Adopted: Yes   Problem Relation Age of Onset    Hypertension Mother     Cancer Mother         breast    Diabetes Mother     Hypertension Father     Cataracts Father     Heart disease Father         mi    Diabetes Sister     No Known Problems Brother     Heart disease Sister         MI    No Known Problems Sister     Hypertension Maternal Aunt     No Known Problems Maternal Uncle     No Known Problems Paternal Aunt     No Known Problems Paternal Uncle     No Known Problems Maternal Grandmother     No Known Problems Maternal Grandfather     No Known Problems Paternal Grandmother     No Known Problems Paternal Grandfather     Amblyopia Neg Hx     Blindness Neg Hx     Glaucoma Neg Hx     Macular degeneration Neg Hx     Retinal detachment Neg Hx     Strabismus Neg Hx     Stroke Neg Hx     Thyroid disease Neg Hx        Social History     Socioeconomic History    Marital status: Single    Number of children: 0   Occupational History    Occupation: Retired     Employer: [employer]   Tobacco Use    Smoking status: Never    Smokeless tobacco: Never   Substance and Sexual Activity    Alcohol use: No    Drug use: No    Sexual activity: Yes     Partners: Female     Birth control/protection: Condom               Objective:      Vitals:    10/17/22 0819   Weight: 109.8 kg (242 lb 1 oz)   Height: 6' 1" (1.854 m)   PainSc: 0-No pain        Physical Exam  Constitutional:       Appearance: He is well-developed.   Cardiovascular:      Comments: Dorsalis pedis and posterior tibial pulses are palpable Skin is atrophic, " slightly hyperpigmented, and mildly edematous      Musculoskeletal:         General: No tenderness. Normal range of motion.      Comments: Adequate joint range of motion without pain, limitation, nor crepitation Bilateral feet and ankle joints. Muscle strength is 5/5 in all groups bilaterally.  S/p partial 5th ray amp b/l       Skin:     General: Skin is warm and dry.      Coloration: Skin is ashen. Skin is not jaundiced, mottled or sallow.      Findings: No abscess, ecchymosis, erythema or lesion.      Comments:        Neurological:      Mental Status: He is alert and oriented to person, place, and time.      Comments: [place]enita 5.07 monofilamant testing is diminished Kenji feet. Sharp/dull sensation diminished Bilaterally. Light touch absent Bilaterally.       Psychiatric:         Behavior: Behavior normal.       10/17/2022              Ulcer location:  left, plantar foot   Pre debridement Measurements: 0.5x0.4x0.1 cm   Post debridement Measurements : 0.6x0.4x0.1 cm   Signs of infection: No  Erythema: No  Undermining: No  Tunneling: No  Drainage: serosanguinous  Periwound skin: intact  Wound Base: granular    Ulcer location:  right, plantar foot   Pre debridement Measurements: 1.2x0.5x0.7 cm   Post debridement Measurements : 1.7x0.7x0.8 cm   Signs of infection: Yes--> deep probe to bone with exposed tendon  Erythema: No  Undermining: No  Tunneling: Yes  Drainage: Bloody  Periwound skin: intact  Wound Base: fibrogranular        Assessment:       Encounter Diagnoses   Name Primary?    Diabetic ulcer of right midfoot associated with type 2 diabetes mellitus, with muscle involvement without evidence of necrosis Yes    Acute osteomyelitis of metatarsal bone of right foot     Diabetic ulcer of left midfoot associated with type 2 diabetes mellitus, with fat layer exposed     Type II diabetes mellitus with neurological manifestations          Plan:       Diagnoses and all orders for this visit:    Diabetic ulcer of  right midfoot associated with type 2 diabetes mellitus, with muscle involvement without evidence of necrosis    Acute osteomyelitis of metatarsal bone of right foot    Diabetic ulcer of left midfoot associated with type 2 diabetes mellitus, with fat layer exposed    Type II diabetes mellitus with neurological manifestations    I counseled the patient on his conditions, their implications and medical management.    B/l chronic foot ulcerations. Stagnant but stable     Debridement: With verbal consent, nonviable tissues on the bilateral foot were debrided beyond sub q utilizing a  sterile No. 3 scalpel and forceps. Minimal bleeding controlled with direct pressure  The patient tolerated this well.     Dressings: [place]  Offloading:Foam football with calamine coflex gabe    Follow-up:due to complexities of wounds, Patient is to return to wound for follow-up but should call [facility] immediately if any signs of infection, such as fever, chills, sweats, increased redness or pain.    Short-term goals include maintaining good offloading and minimizing bioburden, promoting granulation and epithelialization to healing.  Long-term goals include keeping the wound healed by good offloading and medical management under the direction of internist.

## 2022-10-24 NOTE — PROCEDURES
"Indio Ryder Jr. is a 52 y.o. male patient.    Temp: 97.6 °F (36.4 °C) (10/24/22 1409)  Pulse: 93 (10/24/22 1409)  Resp: 18 (10/24/22 1409)  BP: (!) 180/99 (10/24/22 1409)  SpO2: 98 % (10/24/22 1409)  Weight: 104.3 kg (230 lb) (10/24/22 1409)  Height: 6' 1" (185.4 cm) (10/24/22 1409)    PICC  Date/Time: 10/24/2022 2:25 PM  Performed by: Pancho Herrera RN  Consent Done: Yes  Time out: Immediately prior to procedure a time out was called to verify the correct patient, procedure, equipment, support staff and site/side marked as required  Indications: med administration  Anesthesia: local infiltration  Local anesthetic: lidocaine 1% without epinephrine  Anesthetic Total (mL): 1  Preparation: skin prepped with ChloraPrep  Skin prep agent dried: skin prep agent completely dried prior to procedure  Sterile barriers: all five maximum sterile barriers used - cap, mask, sterile gown, sterile gloves, and large sterile sheet  Hand hygiene: hand hygiene performed prior to central venous catheter insertion  Location details: right basilic  Catheter type: double lumen  Catheter size: 5 Fr  Catheter Length: 43cm    Ultrasound guidance: yes  Vessel Caliber: medium and large and patent, compressibility normal  Needle advanced into vessel with real time Ultrasound guidance.  Guidewire confirmed in vessel.  Sterile sheath used.  Number of attempts: 1  Post-procedure: blood return through all ports, chlorhexidine patch and sterile dressing applied  Estimated blood loss (mL): 0          Name Pancho Herrera   10/24/2022    "

## 2022-10-25 ENCOUNTER — TELEPHONE (OUTPATIENT)
Dept: INFECTIOUS DISEASES | Facility: CLINIC | Age: 52
End: 2022-10-25
Payer: MEDICARE

## 2022-10-25 ENCOUNTER — HOSPITAL ENCOUNTER (OUTPATIENT)
Dept: WOUND CARE | Facility: HOSPITAL | Age: 52
Discharge: HOME | End: 2022-10-25
Attending: FAMILY MEDICINE
Payer: MEDICARE

## 2022-10-25 ENCOUNTER — INFUSION (OUTPATIENT)
Dept: INFECTIOUS DISEASES | Facility: HOSPITAL | Age: 52
End: 2022-10-25
Payer: MEDICARE

## 2022-10-25 VITALS — TEMPERATURE: 98 F | SYSTOLIC BLOOD PRESSURE: 154 MMHG | HEART RATE: 80 BPM | DIASTOLIC BLOOD PRESSURE: 85 MMHG

## 2022-10-25 VITALS
OXYGEN SATURATION: 98 % | BODY MASS INDEX: 30.34 KG/M2 | WEIGHT: 229.94 LBS | HEART RATE: 97 BPM | TEMPERATURE: 97 F | DIASTOLIC BLOOD PRESSURE: 91 MMHG | RESPIRATION RATE: 18 BRPM | SYSTOLIC BLOOD PRESSURE: 162 MMHG

## 2022-10-25 DIAGNOSIS — L97.529 DIABETIC ULCER OF LEFT FOOT: ICD-10-CM

## 2022-10-25 DIAGNOSIS — L97.512 DIABETIC ULCER OF RIGHT FOOT ASSOCIATED WITH TYPE 2 DIABETES MELLITUS, WITH FAT LAYER EXPOSED: Primary | ICD-10-CM

## 2022-10-25 DIAGNOSIS — E11.621 DIABETIC ULCER OF LEFT FOOT: ICD-10-CM

## 2022-10-25 DIAGNOSIS — M86.9 OSTEOMYELITIS, UNSPECIFIED SITE, UNSPECIFIED TYPE: Primary | ICD-10-CM

## 2022-10-25 DIAGNOSIS — M86.10 ACUTE ON CHRONIC OSTEOMYELITIS: Primary | ICD-10-CM

## 2022-10-25 DIAGNOSIS — E11.621 DIABETIC ULCER OF RIGHT FOOT ASSOCIATED WITH TYPE 2 DIABETES MELLITUS, WITH FAT LAYER EXPOSED: Primary | ICD-10-CM

## 2022-10-25 DIAGNOSIS — M86.60 ACUTE ON CHRONIC OSTEOMYELITIS: Primary | ICD-10-CM

## 2022-10-25 PROCEDURE — 29581 APPL MULTLAYER CMPRN SYS LEG: CPT | Mod: 50

## 2022-10-25 PROCEDURE — 96365 THER/PROPH/DIAG IV INF INIT: CPT | Mod: 59

## 2022-10-25 PROCEDURE — 25000003 PHARM REV CODE 250: Performed by: INTERNAL MEDICINE

## 2022-10-25 PROCEDURE — 63600175 PHARM REV CODE 636 W HCPCS: Performed by: INTERNAL MEDICINE

## 2022-10-25 PROCEDURE — 96523 IRRIG DRUG DELIVERY DEVICE: CPT

## 2022-10-25 RX ORDER — SODIUM CHLORIDE 0.9 % (FLUSH) 0.9 %
10 SYRINGE (ML) INJECTION
Status: CANCELLED | OUTPATIENT
Start: 2022-10-25

## 2022-10-25 RX ORDER — HEPARIN 100 UNIT/ML
500 SYRINGE INTRAVENOUS
Status: CANCELLED | OUTPATIENT
Start: 2022-10-25

## 2022-10-25 RX ORDER — HEPARIN 100 UNIT/ML
500 SYRINGE INTRAVENOUS
Status: DISCONTINUED | OUTPATIENT
Start: 2022-10-25 | End: 2022-10-25 | Stop reason: HOSPADM

## 2022-10-25 RX ORDER — SODIUM CHLORIDE 0.9 % (FLUSH) 0.9 %
10 SYRINGE (ML) INJECTION
Status: DISCONTINUED | OUTPATIENT
Start: 2022-10-25 | End: 2022-10-25 | Stop reason: HOSPADM

## 2022-10-25 RX ADMIN — HEPARIN 500 UNITS: 100 SYRINGE at 03:10

## 2022-10-25 RX ADMIN — MEROPENEM 2 G: 1 INJECTION INTRAVENOUS at 02:10

## 2022-10-25 RX ADMIN — SODIUM CHLORIDE: 9 INJECTION, SOLUTION INTRAVENOUS at 01:10

## 2022-10-25 NOTE — PROGRESS NOTES
Pt arrived to infusion suite for merepenem 2 gm over 1 hr., through purple port, flushed well with good blood return, at end of infusion again flushed with NS and heparin, tolerated well.  Left unit in NAD.

## 2022-10-25 NOTE — PROGRESS NOTES
[facility] [address] [place]  [address]  Nurse Visit    Subjective:       Patient seen in clinic today.  Dressing changed as ordered.      Assessment:          Wound 04/08/22 1137 Diabetic Ulcer Left plantar;medial Foot (Active)   04/08/22 1137    Pre-existing: Yes   Primary Wound Type: Diabetic ulc   Side: Left   Orientation: plantar;medial   Location: Foot   Wound Number:    Ankle-Brachial Index:    Pulses:    Removal Indication and Assessment:    Wound Outcome:    (Retired) Wound Type:    (Retired) Wound Length (cm):    (Retired) Wound Width (cm):    (Retired) Depth (cm):    Wound Description (Comments):    Removal Indications:    Wound Image   10/25/22 1119   Wound WDL ex 10/25/22 1119   Dressing Appearance Intact;Dried drainage 10/25/22 1119   Drainage Amount Small 10/25/22 1119   Drainage Characteristics/Odor Serosanguineous 10/25/22 1119   Appearance Red 10/25/22 1119   Tissue loss description Partial thickness 10/25/22 1119   Black (%), Wound Tissue Color 0 % 10/25/22 1119   Red (%), Wound Tissue Color 100 % 10/25/22 1119   Yellow (%), Wound Tissue Color 0 % 10/25/22 1119   Periwound Area Dry 10/25/22 1119   Wound Edges Defined;Open 10/25/22 1119   Care Cleansed with:;Antimicrobial agent;Sterile normal saline;Wound cleanser 10/25/22 1119   Dressing Changed 10/25/22 1119   Periwound Care Moisture barrier applied 10/25/22 1119   Compression Two layer compression 10/25/22 1119   Off Loading Football dressing;Off loading shoe 10/25/22 1119   Dressing Change Due 10/28/22 10/25/22 1119            Wound 05/06/22 1142 Diabetic Ulcer Right plantar Foot (Active)   05/06/22 1142    Pre-existing: Yes   Primary Wound Type: Diabetic ulc   Side: Right   Orientation: plantar   Location: Foot   Wound Number:    Ankle-Brachial Index:    Pulses:    Removal Indication and Assessment:    Wound Outcome:    (Retired) Wound Type:    (Retired) Wound Length (cm):    (Retired) Wound Width (cm):     (Retired) Depth (cm):    Wound Description (Comments):    Removal Indications:    Wound Image   10/25/22 1119   Wound WDL ex 10/25/22 1119   Dressing Appearance Intact;Moist drainage 10/25/22 1119   Drainage Amount Moderate 10/25/22 1119   Drainage Characteristics/Odor Serosanguineous 10/25/22 1119   Appearance Red 10/25/22 1119   Tissue loss description Partial thickness 10/25/22 1119   Black (%), Wound Tissue Color 0 % 10/25/22 1119   Red (%), Wound Tissue Color 100 % 10/25/22 1119   Yellow (%), Wound Tissue Color 0 % 10/25/22 1119   Periwound Area Macerated;Pale white 10/25/22 1119   Wound Edges Defined;Open 10/25/22 1119   Care Cleansed with:;Antimicrobial agent;Sterile normal saline;Wound cleanser 10/25/22 1119   Dressing Changed 10/25/22 1119   Periwound Care Moisture barrier applied 10/25/22 1119   Compression Two layer compression 10/25/22 1119   Off Loading Football dressing;Off loading shoe 10/25/22 1119   Dressing Change Due 10/28/22 10/25/22 1119           Plan:     No orders of the defined types were placed in this encounter.          Follow up in about 3 days (around 10/28/2022) for wound care.

## 2022-10-28 ENCOUNTER — HOSPITAL ENCOUNTER (OUTPATIENT)
Dept: WOUND CARE | Facility: HOSPITAL | Age: 52
Discharge: HOME | End: 2022-10-28
Attending: FAMILY MEDICINE
Payer: MEDICARE

## 2022-10-28 VITALS
TEMPERATURE: 98 F | HEART RATE: 96 BPM | DIASTOLIC BLOOD PRESSURE: 95 MMHG | RESPIRATION RATE: 20 BRPM | SYSTOLIC BLOOD PRESSURE: 182 MMHG

## 2022-10-28 DIAGNOSIS — E11.621 DIABETIC ULCER OF LEFT FOOT: ICD-10-CM

## 2022-10-28 DIAGNOSIS — E11.621 DIABETIC ULCER OF RIGHT FOOT ASSOCIATED WITH TYPE 2 DIABETES MELLITUS, WITH FAT LAYER EXPOSED: Primary | ICD-10-CM

## 2022-10-28 DIAGNOSIS — L97.512 DIABETIC ULCER OF RIGHT FOOT ASSOCIATED WITH TYPE 2 DIABETES MELLITUS, WITH FAT LAYER EXPOSED: Primary | ICD-10-CM

## 2022-10-28 DIAGNOSIS — L97.529 DIABETIC ULCER OF LEFT FOOT: ICD-10-CM

## 2022-10-28 PROCEDURE — 99214 PR OFFICE/OUTPT VISIT, EST, LEVL IV, 30-39 MIN: ICD-10-PCS | Mod: ,,, | Performed by: FAMILY MEDICINE

## 2022-10-28 PROCEDURE — 99214 OFFICE O/P EST MOD 30 MIN: CPT | Mod: ,,, | Performed by: FAMILY MEDICINE

## 2022-10-28 PROCEDURE — 29581 APPL MULTLAYER CMPRN SYS LEG: CPT | Mod: 50

## 2022-10-28 NOTE — PROGRESS NOTES
[facility]                Progress Note    Subjective:       Patient ID: Indio Ryder Jr. is a 52 y.o. male.    Chief Complaint: Wound Check    F/u wound care visit. Patient ambulatory to exam room with no difficulty noted. Patient denies pain or discomfort at present. Wound dressing to RLE intact with moderate amount of drainage noted. Wound to right plantar foot measuring smaller. Wound dressing to LLE intact with small amount of drainage noted with wound to left plantar foot measuring smaller. Wound care done per order. RTC in one week.    Review of Systems   Constitutional: Negative.    HENT: Negative.     Eyes: Negative.    Respiratory: Negative.     Cardiovascular: Negative.    Musculoskeletal: Negative.    Skin:  Positive for wound.   Neurological: Negative.    Hematological: Negative.    All other systems reviewed and are negative.      Objective:        Physical Exam  Vitals reviewed.   Constitutional:       Appearance: Normal appearance. He is well-developed.   HENT:      Head: Normocephalic and atraumatic.   Eyes:      Extraocular Movements: Extraocular movements intact.      Conjunctiva/sclera: Conjunctivae normal.      Pupils: Pupils are equal, round, and reactive to light.   Musculoskeletal:      Cervical back: Normal range of motion.   Skin:     General: Skin is warm and dry.      Comments: Please see wound description   Neurological:      Mental Status: He is alert and oriented to person, place, and time.   Psychiatric:         Behavior: Behavior normal.       Vitals:    10/28/22 1101   BP: (!) 182/95   Pulse: 96   Resp: 20   Temp: 98.1 °F (36.7 °C)       Assessment:           ICD-10-CM ICD-9-CM   1. Diabetic ulcer of right foot associated with type 2 diabetes mellitus, with fat layer exposed  E11.621 250.80    L97.512 707.15   2. Diabetic ulcer of left foot  E11.621 250.80    L97.529 707.15            Wound 04/08/22 1137 Diabetic Ulcer Left  plantar;medial Foot (Active)   04/08/22 1137    Pre-existing: Yes   Primary Wound Type: Diabetic ulc   Side: Left   Orientation: plantar;medial   Location: Foot   Wound Number:    Ankle-Brachial Index:    Pulses:    Removal Indication and Assessment:    Wound Outcome:    (Retired) Wound Type:    (Retired) Wound Length (cm):    (Retired) Wound Width (cm):    (Retired) Depth (cm):    Wound Description (Comments):    Removal Indications:    Wound Image   10/28/22 1117   Wound WDL ex 10/28/22 1117   Dressing Appearance Intact;Moist drainage 10/28/22 1117   Drainage Amount Small 10/28/22 1117   Drainage Characteristics/Odor Serosanguineous 10/28/22 1117   Appearance Red 10/28/22 1117   Tissue loss description Partial thickness 10/28/22 1117   Black (%), Wound Tissue Color 0 % 10/28/22 1117   Red (%), Wound Tissue Color 100 % 10/28/22 1117   Yellow (%), Wound Tissue Color 0 % 10/28/22 1117   Periwound Area Dry;Intact 10/28/22 1117   Wound Length (cm) 0.3 cm 10/28/22 1117   Wound Width (cm) 0.3 cm 10/28/22 1117   Wound Depth (cm) 0.1 cm 10/28/22 1117   Wound Volume (cm^3) 0.009 cm^3 10/28/22 1117   Wound Surface Area (cm^2) 0.09 cm^2 10/28/22 1117   Care Cleansed with:;Soap and water;Sterile normal saline 10/28/22 1117   Dressing Changed 10/28/22 1117   Periwound Care Skin barrier film applied 10/28/22 1117   Compression Two layer compression 10/28/22 1117   Off Loading Football dressing;Off loading shoe 10/28/22 1117   Dressing Change Due 11/04/22 10/28/22 1117            Wound 05/06/22 1142 Diabetic Ulcer Right plantar Foot (Active)   05/06/22 1142    Pre-existing: Yes   Primary Wound Type: Diabetic ulc   Side: Right   Orientation: plantar   Location: Foot   Wound Number:    Ankle-Brachial Index:    Pulses:    Removal Indication and Assessment:    Wound Outcome:    (Retired) Wound Type:    (Retired) Wound Length (cm):    (Retired) Wound Width (cm):    (Retired) Depth (cm):    Wound Description (Comments):    Removal  Indications:    Wound Image   10/28/22 1117   Wound WDL ex 10/28/22 1117   Dressing Appearance Intact;Moist drainage 10/28/22 1117   Drainage Amount Moderate 10/28/22 1117   Drainage Characteristics/Odor Serosanguineous 10/28/22 1117   Appearance Red 10/28/22 1117   Tissue loss description Full thickness 10/28/22 1117   Black (%), Wound Tissue Color 0 % 10/28/22 1117   Red (%), Wound Tissue Color 100 % 10/28/22 1117   Yellow (%), Wound Tissue Color 0 % 10/28/22 1117   Periwound Area Pale white 10/28/22 1117   Wound Edges Callused 10/28/22 1117   Wound Length (cm) 1 cm 10/28/22 1117   Wound Width (cm) 0.5 cm 10/28/22 1117   Wound Depth (cm) 0.6 cm 10/28/22 1117   Wound Volume (cm^3) 0.3 cm^3 10/28/22 1117   Wound Surface Area (cm^2) 0.5 cm^2 10/28/22 1117   Care Cleansed with:;Soap and water;Sterile normal saline 10/28/22 1117   Dressing Changed 10/28/22 1117   Periwound Care Skin barrier film applied 10/28/22 1117   Compression Two layer compression 10/28/22 1117   Off Loading Football dressing;Off loading shoe 10/28/22 1117   Dressing Change Due 11/04/22 10/28/22 1117           Plan:          Debridement not needed and Not Done today.   Continue off-loading / leg elevation   Continue eating protein   Keep dressing clean and intact  Continue with wound care orders and plan as noted in orders.   Continue to follow current medication regimen as per pcp   Call for any questions / concerns.         Orders Placed This Encounter   Procedures    Change dressing     Clean with NS. Vashe soak x10 mins.   BILATERAL FEET: Gentian violet to periwound. Cavilon to plantar foot. Custom felt pad with small whole over wound to allow for drainage. [place] to wound bed. Mextra short x1, tim foam long x2. Football dressing (cast padding x3). Calamine Coflex.        Follow up in about 1 week (around 11/4/2022) for MD wound care visit.

## 2022-10-31 ENCOUNTER — OFFICE VISIT (OUTPATIENT)
Dept: INFECTIOUS DISEASES | Facility: CLINIC | Age: 52
End: 2022-10-31
Payer: MEDICARE

## 2022-10-31 VITALS
WEIGHT: 240.75 LBS | TEMPERATURE: 98 F | SYSTOLIC BLOOD PRESSURE: 154 MMHG | DIASTOLIC BLOOD PRESSURE: 91 MMHG | HEIGHT: 73 IN | BODY MASS INDEX: 31.91 KG/M2 | HEART RATE: 86 BPM

## 2022-10-31 DIAGNOSIS — Z79.2 RECEIVING INTRAVENOUS ANTIBIOTIC TREATMENT AS OUTPATIENT: ICD-10-CM

## 2022-10-31 DIAGNOSIS — Z79.4 TYPE 2 DIABETES MELLITUS WITH HYPERGLYCEMIA, WITH LONG-TERM CURRENT USE OF INSULIN: ICD-10-CM

## 2022-10-31 DIAGNOSIS — E11.65 TYPE 2 DIABETES MELLITUS WITH HYPERGLYCEMIA, WITH LONG-TERM CURRENT USE OF INSULIN: ICD-10-CM

## 2022-10-31 DIAGNOSIS — Z16.12 ESBL (EXTENDED SPECTRUM BETA-LACTAMASE) PRODUCING BACTERIA INFECTION: ICD-10-CM

## 2022-10-31 DIAGNOSIS — M86.60 ACUTE ON CHRONIC OSTEOMYELITIS: Primary | ICD-10-CM

## 2022-10-31 DIAGNOSIS — E08.621 DIABETIC ULCER OF RIGHT MIDFOOT ASSOCIATED WITH DIABETES MELLITUS DUE TO UNDERLYING CONDITION, LIMITED TO BREAKDOWN OF SKIN: ICD-10-CM

## 2022-10-31 DIAGNOSIS — A49.9 ESBL (EXTENDED SPECTRUM BETA-LACTAMASE) PRODUCING BACTERIA INFECTION: ICD-10-CM

## 2022-10-31 DIAGNOSIS — L97.411 DIABETIC ULCER OF RIGHT MIDFOOT ASSOCIATED WITH DIABETES MELLITUS DUE TO UNDERLYING CONDITION, LIMITED TO BREAKDOWN OF SKIN: ICD-10-CM

## 2022-10-31 DIAGNOSIS — M86.10 ACUTE ON CHRONIC OSTEOMYELITIS: Primary | ICD-10-CM

## 2022-10-31 PROCEDURE — 99999 PR PBB SHADOW E&M-EST. PATIENT-LVL III: CPT | Mod: PBBFAC,,, | Performed by: INTERNAL MEDICINE

## 2022-10-31 PROCEDURE — 99214 OFFICE O/P EST MOD 30 MIN: CPT | Mod: S$PBB,,, | Performed by: INTERNAL MEDICINE

## 2022-10-31 PROCEDURE — 99214 PR OFFICE/OUTPT VISIT, EST, LEVL IV, 30-39 MIN: ICD-10-PCS | Mod: S$PBB,,, | Performed by: INTERNAL MEDICINE

## 2022-10-31 PROCEDURE — 99999 PR PBB SHADOW E&M-EST. PATIENT-LVL III: ICD-10-PCS | Mod: PBBFAC,,, | Performed by: INTERNAL MEDICINE

## 2022-10-31 PROCEDURE — 99213 OFFICE O/P EST LOW 20 MIN: CPT | Mod: PBBFAC | Performed by: INTERNAL MEDICINE

## 2022-10-31 NOTE — PROGRESS NOTES
Subjective:      Chief Complaint: Follow-up for right foot osteomyelitis    History of Present Illness  52-year-old male with history of IDDM2, diabetic neuropathy, multiple episodes of foot osteomyelitis, HTN, HLD, presents for follow-up of right foot osteo.    Summary of illness:  Patient reports new wound on right foot - started July 2022. Reported he got his boot wet and ulcer subsequently healed. Cultures obtained with E faecalis. Patient completed multiple courses of antibiotics including minocycline, doxycycline. Patient with progression of wound - MRI notable for underlying osteomyelitis.     Subjective:  Since last visit, bone cultures obtained with ESBL Kleb, only sensitive to meropenem and amikacin. Patient had PICC line placed, started on meropenem 2g IV q8 hours.    Patient reports not having any adverse reactions to the antibiotics, denied having any diarrhea, abdominal pain.    Review of Systems   Constitutional:  Negative for chills, diaphoresis, fever and weight loss.   HENT:  Negative for congestion, sinus pain and sore throat.    Eyes:  Negative for photophobia and pain.   Respiratory:  Negative for cough, sputum production and shortness of breath.    Cardiovascular:  Negative for chest pain and leg swelling.   Gastrointestinal:  Negative for abdominal pain, diarrhea, nausea and vomiting.   Genitourinary:  Negative for dysuria and hematuria.   Musculoskeletal:  Negative for joint pain.   Skin:  Negative for rash.        +wound   Neurological:  Negative for focal weakness and headaches.   Psychiatric/Behavioral:  Negative for depression. The patient is not nervous/anxious.        Objective:   Physical Exam  Constitutional:       General: He is not in acute distress.     Appearance: He is well-developed. He is not diaphoretic.   HENT:      Head: Normocephalic and atraumatic.   Eyes:      Conjunctiva/sclera: Conjunctivae normal.   Pulmonary:      Effort: Pulmonary effort is normal. No respiratory  distress.   Abdominal:      General: There is no distension.      Palpations: Abdomen is soft.   Musculoskeletal:         General: Normal range of motion.      Comments: Bilateral feet wrapped in boots   Skin:     General: Skin is warm and dry.      Findings: No erythema or rash.   Neurological:      Mental Status: He is alert and oriented to person, place, and time.   Psychiatric:         Behavior: Behavior normal.     Per wound care visit 10/28/2022                      Significant results reviewed:    Sodium   Date Value Ref Range Status   10/12/2022 142 136 - 145 mmol/L Final   09/27/2022 139 136 - 145 mmol/L Final   07/22/2022 139 136 - 145 mmol/L Final      Potassium   Date Value Ref Range Status   10/12/2022 4.0 3.5 - 5.1 mmol/L Final   09/27/2022 3.8 3.5 - 5.1 mmol/L Final   07/22/2022 4.1 3.5 - 5.1 mmol/L Final      Chloride   Date Value Ref Range Status   10/12/2022 105 95 - 110 mmol/L Final   09/27/2022 107 95 - 110 mmol/L Final   07/22/2022 104 95 - 110 mmol/L Final      CO2   Date Value Ref Range Status   10/12/2022 30 (H) 23 - 29 mmol/L Final   09/27/2022 29 23 - 29 mmol/L Final   07/22/2022 26 23 - 29 mmol/L Final      BUN   Date Value Ref Range Status   10/12/2022 11 6 - 20 mg/dL Final   09/27/2022 14 6 - 20 mg/dL Final   07/22/2022 15 6 - 20 mg/dL Final      Creatinine   Date Value Ref Range Status   10/12/2022 1.1 0.5 - 1.4 mg/dL Final   09/27/2022 1.2 0.5 - 1.4 mg/dL Final   07/22/2022 1.3 0.5 - 1.4 mg/dL Final      Glucose   Date Value Ref Range Status   10/12/2022 228 (H) 70 - 110 mg/dL Final   09/27/2022 222 (H) 70 - 110 mg/dL Final   07/22/2022 234 (H) 70 - 110 mg/dL Final       ALT   Date Value Ref Range Status   10/12/2022 22 10 - 44 U/L Final   09/27/2022 19 10 - 44 U/L Final   07/22/2022 41 10 - 44 U/L Final      AST   Date Value Ref Range Status   10/12/2022 18 10 - 40 U/L Final   09/27/2022 18 10 - 40 U/L Final   07/22/2022 44 (H) 10 - 40 U/L Final      Total Bilirubin   Date Value Ref  Range Status   10/12/2022 0.8 0.1 - 1.0 mg/dL Final     Comment:     For infants and newborns, interpretation of results should be based  on gestational age, weight and in agreement with clinical  observations.    Premature Infant recommended reference ranges:  Up to 24 hours.............<8.0 mg/dL  Up to 48 hours............<12.0 mg/dL  3-5 days..................<15.0 mg/dL  6-29 days.................<15.0 mg/dL     09/27/2022 0.5 0.1 - 1.0 mg/dL Final     Comment:     For infants and newborns, interpretation of results should be based  on gestational age, weight and in agreement with clinical  observations.    Premature Infant recommended reference ranges:  Up to 24 hours.............<8.0 mg/dL  Up to 48 hours............<12.0 mg/dL  3-5 days..................<15.0 mg/dL  6-29 days.................<15.0 mg/dL     07/22/2022 0.4 0.1 - 1.0 mg/dL Final     Comment:     For infants and newborns, interpretation of results should be based  on gestational age, weight and in agreement with clinical  observations.    Premature Infant recommended reference ranges:  Up to 24 hours.............<8.0 mg/dL  Up to 48 hours............<12.0 mg/dL  3-5 days..................<15.0 mg/dL  6-29 days.................<15.0 mg/dL        Albumin   Date Value Ref Range Status   10/12/2022 3.4 (L) 3.5 - 5.2 g/dL Final   09/27/2022 3.5 3.5 - 5.2 g/dL Final   07/22/2022 3.1 (L) 3.5 - 5.2 g/dL Final      Total Protein   Date Value Ref Range Status   10/12/2022 7.5 6.0 - 8.4 g/dL Final   09/27/2022 7.5 6.0 - 8.4 g/dL Final   07/22/2022 7.7 6.0 - 8.4 g/dL Final      Alkaline Phosphatase   Date Value Ref Range Status   10/12/2022 189 (H) 55 - 135 U/L Final   09/27/2022 183 (H) 55 - 135 U/L Final   07/22/2022 203 (H) 55 - 135 U/L Final        WBC   Date Value Ref Range Status   10/12/2022 3.99 3.90 - 12.70 K/uL Final   07/22/2022 5.01 3.90 - 12.70 K/uL Final   04/29/2022 5.51 3.90 - 12.70 K/uL Final      Hemoglobin   Date Value Ref Range Status    10/12/2022 12.7 (L) 14.0 - 18.0 g/dL Final   07/22/2022 12.1 (L) 14.0 - 18.0 g/dL Final   04/29/2022 9.9 (L) 14.0 - 18.0 g/dL Final      POC Hematocrit   Date Value Ref Range Status   05/30/2017 44 36 - 54 %PCV Final     Hematocrit   Date Value Ref Range Status   10/12/2022 41.0 40.0 - 54.0 % Final   07/22/2022 37.3 (L) 40.0 - 54.0 % Final   04/29/2022 31.5 (L) 40.0 - 54.0 % Final      Platelets   Date Value Ref Range Status   10/12/2022 210 150 - 450 K/uL Final   07/22/2022 196 150 - 450 K/uL Final   04/29/2022 183 150 - 450 K/uL Final         Enterococcus faecalis     CULTURE, AEROBIC  (SPECIFY SOURCE)     Ampicillin <=2 mcg/mL Sensitive     Gentamicin Synergy Screen <=500 mcg/mL Sensitive     Tetracycline <=4 mcg/mL Sensitive     Vancomycin 2 mcg/mL Sensitive        MR Foot 9/27/2022  Focal definite cutaneous plantar ulcer at 3rd MTP joint region with adjacent osteomyelitis septic joint 3rd MTP joint.  Additional bone joint soft tissue structures findings discussed above and clinical correlation requested.    Assessment:   52-year-old male with history of IDDM2, diabetic neuropathy, multiple episodes of foot osteomyelitis, HTN, HLD, presents for follow-up of right foot ESBL Klebsiella osteomyelitis, on IV meropenem     - Prior notes by wound care reviewed  - Results reviewed as above      Plan:   - Continue meropenem 2g IV q8 hours  - qweekly CBC with diff, CMP, CRP  - Follow-up with wound care and podiatry  - Estimated last day 12/5/2022    30 minutes of total time spent on the encounter, which includes face to face time and non-face to face time preparing to see the patient (eg, review of tests), Obtaining and/or reviewing separately obtained history, Documenting clinical information in the electronic or other health record, Independently interpreting results (not separately reported) and communicating results to the patient/family/caregiver, or Care coordination (not separately reported).       Maria Dolores Stevenson  MD MPH  [facility]

## 2022-11-01 ENCOUNTER — INFUSION (OUTPATIENT)
Dept: INFECTIOUS DISEASES | Facility: HOSPITAL | Age: 52
End: 2022-11-01
Attending: INTERNAL MEDICINE
Payer: MEDICARE

## 2022-11-01 DIAGNOSIS — M86.10 ACUTE ON CHRONIC OSTEOMYELITIS: Primary | ICD-10-CM

## 2022-11-01 DIAGNOSIS — M86.60 ACUTE ON CHRONIC OSTEOMYELITIS: Primary | ICD-10-CM

## 2022-11-01 DIAGNOSIS — M86.9 OSTEOMYELITIS, UNSPECIFIED SITE, UNSPECIFIED TYPE: ICD-10-CM

## 2022-11-01 LAB
ALBUMIN SERPL BCP-MCNC: 2.8 G/DL (ref 3.5–5.2)
ALP SERPL-CCNC: 181 U/L (ref 55–135)
ALT SERPL W/O P-5'-P-CCNC: 15 U/L (ref 10–44)
ANION GAP SERPL CALC-SCNC: 11 MMOL/L (ref 8–16)
AST SERPL-CCNC: 19 U/L (ref 10–40)
BASOPHILS # BLD AUTO: 0.02 K/UL (ref 0–0.2)
BASOPHILS NFR BLD: 0.4 % (ref 0–1.9)
BILIRUB SERPL-MCNC: 0.6 MG/DL (ref 0.1–1)
BUN SERPL-MCNC: 9 MG/DL (ref 6–20)
CALCIUM SERPL-MCNC: 8.8 MG/DL (ref 8.7–10.5)
CHLORIDE SERPL-SCNC: 99 MMOL/L (ref 95–110)
CO2 SERPL-SCNC: 26 MMOL/L (ref 23–29)
CREAT SERPL-MCNC: 0.9 MG/DL (ref 0.5–1.4)
CRP SERPL-MCNC: 15.9 MG/L (ref 0–8.2)
DIFFERENTIAL METHOD: ABNORMAL
EOSINOPHIL # BLD AUTO: 0.2 K/UL (ref 0–0.5)
EOSINOPHIL NFR BLD: 3 % (ref 0–8)
ERYTHROCYTE [DISTWIDTH] IN BLOOD BY AUTOMATED COUNT: 13.2 % (ref 11.5–14.5)
EST. GFR  (NO RACE VARIABLE): >60 ML/MIN/1.73 M^2
GLUCOSE SERPL-MCNC: 366 MG/DL (ref 70–110)
HCT VFR BLD AUTO: 35.8 % (ref 40–54)
HGB BLD-MCNC: 10.9 G/DL (ref 14–18)
IMM GRANULOCYTES # BLD AUTO: 0.01 K/UL (ref 0–0.04)
IMM GRANULOCYTES NFR BLD AUTO: 0.2 % (ref 0–0.5)
LYMPHOCYTES # BLD AUTO: 1.5 K/UL (ref 1–4.8)
LYMPHOCYTES NFR BLD: 29.5 % (ref 18–48)
MCH RBC QN AUTO: 28.9 PG (ref 27–31)
MCHC RBC AUTO-ENTMCNC: 30.4 G/DL (ref 32–36)
MCV RBC AUTO: 95 FL (ref 82–98)
MONOCYTES # BLD AUTO: 0.4 K/UL (ref 0.3–1)
MONOCYTES NFR BLD: 8.3 % (ref 4–15)
NEUTROPHILS # BLD AUTO: 3 K/UL (ref 1.8–7.7)
NEUTROPHILS NFR BLD: 58.6 % (ref 38–73)
NRBC BLD-RTO: 0 /100 WBC
PLATELET # BLD AUTO: 315 K/UL (ref 150–450)
PMV BLD AUTO: 11 FL (ref 9.2–12.9)
POTASSIUM SERPL-SCNC: 3.3 MMOL/L (ref 3.5–5.1)
PROT SERPL-MCNC: 7.2 G/DL (ref 6–8.4)
RBC # BLD AUTO: 3.77 M/UL (ref 4.6–6.2)
SODIUM SERPL-SCNC: 136 MMOL/L (ref 136–145)
WBC # BLD AUTO: 5.08 K/UL (ref 3.9–12.7)

## 2022-11-01 PROCEDURE — 86140 C-REACTIVE PROTEIN: CPT | Performed by: INTERNAL MEDICINE

## 2022-11-01 PROCEDURE — 96523 IRRIG DRUG DELIVERY DEVICE: CPT

## 2022-11-01 PROCEDURE — 63600175 PHARM REV CODE 636 W HCPCS: Performed by: INTERNAL MEDICINE

## 2022-11-01 PROCEDURE — 85025 COMPLETE CBC W/AUTO DIFF WBC: CPT | Performed by: INTERNAL MEDICINE

## 2022-11-01 PROCEDURE — 36592 COLLECT BLOOD FROM PICC: CPT

## 2022-11-01 PROCEDURE — 80053 COMPREHEN METABOLIC PANEL: CPT | Performed by: INTERNAL MEDICINE

## 2022-11-01 RX ORDER — HEPARIN 100 UNIT/ML
500 SYRINGE INTRAVENOUS
Status: DISCONTINUED | OUTPATIENT
Start: 2022-11-01 | End: 2022-11-01 | Stop reason: HOSPADM

## 2022-11-01 RX ORDER — SODIUM CHLORIDE 0.9 % (FLUSH) 0.9 %
10 SYRINGE (ML) INJECTION
Status: CANCELLED | OUTPATIENT
Start: 2022-11-01

## 2022-11-01 RX ORDER — HEPARIN 100 UNIT/ML
500 SYRINGE INTRAVENOUS
Status: CANCELLED | OUTPATIENT
Start: 2022-11-01

## 2022-11-01 RX ADMIN — HEPARIN 500 UNITS: 100 SYRINGE at 02:11

## 2022-11-01 NOTE — PROGRESS NOTES
Pt arrived to infusion suite for blood draw from [facility] PICC line through red port, flushed well with good blood return, 10 cc blood discarded, specimen collected sent to lab,  flushed with NS and heparin, tolerated well.  Dressing change to site done, removed transparent dressing with biopatch in place, no redness or swelling noted, replaced dressing and biopath., tolerated well.  Left unit in NAD.

## 2022-11-04 ENCOUNTER — TELEPHONE (OUTPATIENT)
Dept: WOUND CARE | Facility: HOSPITAL | Age: 52
End: 2022-11-04
Payer: MEDICARE

## 2022-11-04 ENCOUNTER — HOSPITAL ENCOUNTER (OUTPATIENT)
Dept: WOUND CARE | Facility: HOSPITAL | Age: 52
Discharge: HOME | End: 2022-11-04
Attending: FAMILY MEDICINE
Payer: MEDICARE

## 2022-11-04 ENCOUNTER — PATIENT MESSAGE (OUTPATIENT)
Dept: WOUND CARE | Facility: HOSPITAL | Age: 52
End: 2022-11-04
Payer: MEDICARE

## 2022-11-04 ENCOUNTER — HOSPITAL ENCOUNTER (OUTPATIENT)
Dept: RADIOLOGY | Facility: HOSPITAL | Age: 52
Discharge: HOME | End: 2022-11-04
Attending: FAMILY MEDICINE
Payer: MEDICARE

## 2022-11-04 VITALS
RESPIRATION RATE: 20 BRPM | HEART RATE: 87 BPM | TEMPERATURE: 98 F | DIASTOLIC BLOOD PRESSURE: 90 MMHG | SYSTOLIC BLOOD PRESSURE: 161 MMHG

## 2022-11-04 DIAGNOSIS — E11.621 DIABETIC ULCER OF RIGHT MIDFOOT ASSOCIATED WITH TYPE 2 DIABETES MELLITUS, WITH FAT LAYER EXPOSED: ICD-10-CM

## 2022-11-04 DIAGNOSIS — L97.412 DIABETIC ULCER OF RIGHT MIDFOOT ASSOCIATED WITH TYPE 2 DIABETES MELLITUS, WITH FAT LAYER EXPOSED: Primary | ICD-10-CM

## 2022-11-04 DIAGNOSIS — E11.621 DIABETIC ULCER OF LEFT FOOT: ICD-10-CM

## 2022-11-04 DIAGNOSIS — L97.412 DIABETIC ULCER OF RIGHT MIDFOOT ASSOCIATED WITH TYPE 2 DIABETES MELLITUS, WITH FAT LAYER EXPOSED: ICD-10-CM

## 2022-11-04 DIAGNOSIS — L97.529 DIABETIC ULCER OF LEFT FOOT: ICD-10-CM

## 2022-11-04 DIAGNOSIS — E11.621 DIABETIC ULCER OF RIGHT MIDFOOT ASSOCIATED WITH TYPE 2 DIABETES MELLITUS, WITH FAT LAYER EXPOSED: Primary | ICD-10-CM

## 2022-11-04 PROCEDURE — 29581 APPL MULTLAYER CMPRN SYS LEG: CPT

## 2022-11-04 PROCEDURE — 73630 X-RAY EXAM OF FOOT: CPT | Mod: 26,RT,, | Performed by: RADIOLOGY

## 2022-11-04 PROCEDURE — 73630 XR FOOT COMPLETE 3 VIEW RIGHT: ICD-10-PCS | Mod: 26,RT,, | Performed by: RADIOLOGY

## 2022-11-04 PROCEDURE — 73630 X-RAY EXAM OF FOOT: CPT | Mod: TC,FY,RT

## 2022-11-04 PROCEDURE — 99214 PR OFFICE/OUTPT VISIT, EST, LEVL IV, 30-39 MIN: ICD-10-PCS | Mod: 25,,, | Performed by: FAMILY MEDICINE

## 2022-11-04 PROCEDURE — 99214 OFFICE O/P EST MOD 30 MIN: CPT | Mod: 25,,, | Performed by: FAMILY MEDICINE

## 2022-11-04 NOTE — PROGRESS NOTES
Please contact patient.  Please notify patient that he needs to go to the emergency room at this time.  He has noted gas in his tissues of his foot.  He will likely need surgery immediately.  This is a major change from his previous Xray

## 2022-11-04 NOTE — PROGRESS NOTES
"[facility]                Progress Note    Subjective:       Patient ID: Indio Ryder Jr. is a 52 y.o. male.    Chief Complaint: Wound Check    F/u wound care visit. Patient ambulatory to exam room with CAM boot on left foot and darco shoe on right foot. Patient denies pain or discomfort at present. Wound dressing to LLE intact with no drainage noted. Wound to left plantar foot measuring unchanged from last wound care visit. Wound dressing to RLE intact with large amount of drainage noted with breakthrough noted to coban layer of compression wrap. Wound to right plantar foot measuring 0.1cm smaller width and 0.6cm increased depth.  Periwound macerated with "clicking" sound noted to 6 o'clock area of wound. Patient denies ambulating "more than usual". Xray of right foot ordered per MD. Wound care done per order. RTC on Tuesday 11/8/22 for nurse visit and one week for MD visit.     Review of Systems   Constitutional: Negative.    HENT: Negative.     Eyes: Negative.    Respiratory: Negative.     Cardiovascular: Negative.    Musculoskeletal: Negative.    Skin:  Positive for wound.   Neurological: Negative.    Hematological: Negative.    All other systems reviewed and are negative.      Objective:        Physical Exam  Vitals reviewed.   Constitutional:       Appearance: Normal appearance. He is well-developed.   HENT:      Head: Normocephalic and atraumatic.   Eyes:      Extraocular Movements: Extraocular movements intact.      Conjunctiva/sclera: Conjunctivae normal.      Pupils: Pupils are equal, round, and reactive to light.   Musculoskeletal:      Cervical back: Normal range of motion.   Skin:     General: Skin is warm and dry.      Comments: Please see wound description   Neurological:      Mental Status: He is alert and oriented to person, place, and time.   Psychiatric:         Behavior: Behavior normal.       Vitals:    11/04/22 1115   BP: (!) 161/90   Pulse: " 87   Resp: 20   Temp: 97.9 °F (36.6 °C)       Assessment:           ICD-10-CM ICD-9-CM   1. Diabetic ulcer of right midfoot associated with type 2 diabetes mellitus, with fat layer exposed  E11.621 250.80    L97.412 707.14   2. Diabetic ulcer of left foot  E11.621 250.80    L97.529 707.15            Wound 04/08/22 1137 Diabetic Ulcer Left plantar;medial Foot (Active)   04/08/22 1137    Pre-existing: Yes   Primary Wound Type: Diabetic ulc   Side: Left   Orientation: plantar;medial   Location: Foot   Wound Number:    Ankle-Brachial Index:    Pulses:    Removal Indication and Assessment:    Wound Outcome:    (Retired) Wound Type:    (Retired) Wound Length (cm):    (Retired) Wound Width (cm):    (Retired) Depth (cm):    Wound Description (Comments):    Removal Indications:    Wound Image    11/04/22 1056   Wound WDL ex 11/04/22 1056   Dressing Appearance Intact;Dry 11/04/22 1056   Drainage Amount None 11/04/22 1056   Appearance Red 11/04/22 1056   Tissue loss description Partial thickness 11/04/22 1056   Black (%), Wound Tissue Color 0 % 11/04/22 1056   Red (%), Wound Tissue Color 100 % 11/04/22 1056   Yellow (%), Wound Tissue Color 0 % 11/04/22 1056   Periwound Area Dry;Intact 11/04/22 1056   Wound Edges Callused 11/04/22 1056   Wound Length (cm) 0.3 cm 11/04/22 1056   Wound Width (cm) 0.3 cm 11/04/22 1056   Wound Depth (cm) 0.1 cm 11/04/22 1056   Wound Volume (cm^3) 0.009 cm^3 11/04/22 1056   Wound Surface Area (cm^2) 0.09 cm^2 11/04/22 1056   Care Cleansed with:;Soap and water;Sterile normal saline 11/04/22 1056   Dressing Changed 11/04/22 1056   Periwound Care Moisture barrier applied;Skin barrier film applied 11/04/22 1056   Compression Two layer compression 11/04/22 1056   Off Loading Football dressing;Off loading shoe 11/04/22 1056   Dressing Change Due 11/08/22 11/04/22 1056            Wound 05/06/22 1142 Diabetic Ulcer Right plantar Foot (Active)   05/06/22 1142    Pre-existing: Yes   Primary Wound Type:  Diabetic ulc   Side: Right   Orientation: plantar   Location: Foot   Wound Number:    Ankle-Brachial Index:    Pulses:    Removal Indication and Assessment:    Wound Outcome:    (Retired) Wound Type:    (Retired) Wound Length (cm):    (Retired) Wound Width (cm):    (Retired) Depth (cm):    Wound Description (Comments):    Removal Indications:    Wound Image   11/04/22 1056   Wound WDL ex 11/04/22 1056   Dressing Appearance Intact;Saturated 11/04/22 1056   Drainage Amount Large 11/04/22 1056   Drainage Characteristics/Odor Serosanguineous 11/04/22 1056   Appearance Red;Bone 11/04/22 1056   Tissue loss description Full thickness 11/04/22 1056   Black (%), Wound Tissue Color 0 % 11/04/22 1056   Red (%), Wound Tissue Color 100 % 11/04/22 1056   Yellow (%), Wound Tissue Color 0 % 11/04/22 1056   Periwound Area Pale white;Macerated 11/04/22 1056   Wound Edges Defined 11/04/22 1056   Meza Classification (diabetic foot ulcers only) Grade 3 11/04/22 1056   Wound Length (cm) 1 cm 11/04/22 1056   Wound Width (cm) 0.4 cm 11/04/22 1056   Wound Depth (cm) 1.2 cm 11/04/22 1056   Wound Volume (cm^3) 0.48 cm^3 11/04/22 1056   Wound Surface Area (cm^2) 0.4 cm^2 11/04/22 1056   Care Cleansed with:;Soap and water;Sterile normal saline 11/04/22 1056   Dressing Changed 11/04/22 1056   Periwound Care Moisturizer applied;Skin barrier film applied 11/04/22 1056   Compression Two layer compression 11/04/22 1056   Off Loading Football dressing;Off loading shoe 11/04/22 1056   Dressing Change Due 11/08/22 11/04/22 1056           Plan:          Debridement not needed and Not Done today.   Continue off-loading / leg elevation   Continue eating protein   Keep dressing clean and intact  Continue with wound care orders and plan as noted in orders.   Continue to follow current medication regimen as per pcp   Call for any questions / concerns.         Orders Placed This Encounter   Procedures    X-Ray Foot Complete Right     Standing Status:    Future     Number of Occurrences:   1     Standing Expiration Date:   11/4/2023    Change dressing     Clean with NS.  BILATERAL FEET: Gentian violet to periwound. Cavilon to plantar foot. Custom felt pad with small hole over wound to allow for drainage. [place] to wound bed. Drawtex, Mextra short x1, tim foam long x2. Football dressing (cast padding x3). Calamine Coflex.        Follow up in about 4 days (around 11/8/2022) for nurse visit.

## 2022-11-04 NOTE — TELEPHONE ENCOUNTER
Called patient regarding Xray and instructions from MD Concepcion to go to ED. Patient states he will go to ED on [address] immediately.      ----- Message from Cherelle Calix sent at 11/4/2022  1:14 PM CDT -----  Regarding: Imaging Result  Paula Concepcion MD:    Please contact patient.  Please notify patient that he needs to go to the emergency room at this time.  He has noted gas in his tissues of his foot.  He will likely need surgery immediately.  This is a major change from his previous Xray

## 2022-11-08 ENCOUNTER — HOSPITAL ENCOUNTER (OUTPATIENT)
Dept: WOUND CARE | Facility: HOSPITAL | Age: 52
Discharge: HOME | End: 2022-11-08
Attending: FAMILY MEDICINE
Payer: MEDICARE

## 2022-11-08 ENCOUNTER — INFUSION (OUTPATIENT)
Dept: INFECTIOUS DISEASES | Facility: HOSPITAL | Age: 52
End: 2022-11-08
Attending: INTERNAL MEDICINE
Payer: MEDICARE

## 2022-11-08 DIAGNOSIS — L97.412 DIABETIC ULCER OF RIGHT MIDFOOT ASSOCIATED WITH TYPE 2 DIABETES MELLITUS, WITH FAT LAYER EXPOSED: Primary | ICD-10-CM

## 2022-11-08 DIAGNOSIS — M86.10 ACUTE ON CHRONIC OSTEOMYELITIS: ICD-10-CM

## 2022-11-08 DIAGNOSIS — M86.60 ACUTE ON CHRONIC OSTEOMYELITIS: ICD-10-CM

## 2022-11-08 DIAGNOSIS — L97.529 DIABETIC ULCER OF LEFT FOOT: ICD-10-CM

## 2022-11-08 DIAGNOSIS — E11.621 DIABETIC ULCER OF RIGHT MIDFOOT ASSOCIATED WITH TYPE 2 DIABETES MELLITUS, WITH FAT LAYER EXPOSED: Primary | ICD-10-CM

## 2022-11-08 DIAGNOSIS — E11.621 DIABETIC ULCER OF LEFT FOOT: ICD-10-CM

## 2022-11-08 DIAGNOSIS — M86.9 OSTEOMYELITIS, UNSPECIFIED SITE, UNSPECIFIED TYPE: Primary | ICD-10-CM

## 2022-11-08 LAB
ALBUMIN SERPL BCP-MCNC: 2.8 G/DL (ref 3.5–5.2)
ALP SERPL-CCNC: 193 U/L (ref 55–135)
ALT SERPL W/O P-5'-P-CCNC: 10 U/L (ref 10–44)
ANION GAP SERPL CALC-SCNC: 8 MMOL/L (ref 8–16)
AST SERPL-CCNC: 13 U/L (ref 10–40)
BASOPHILS # BLD AUTO: 0.03 K/UL (ref 0–0.2)
BASOPHILS NFR BLD: 0.6 % (ref 0–1.9)
BILIRUB SERPL-MCNC: 0.9 MG/DL (ref 0.1–1)
BUN SERPL-MCNC: 9 MG/DL (ref 6–20)
CALCIUM SERPL-MCNC: 8.9 MG/DL (ref 8.7–10.5)
CHLORIDE SERPL-SCNC: 104 MMOL/L (ref 95–110)
CO2 SERPL-SCNC: 30 MMOL/L (ref 23–29)
CREAT SERPL-MCNC: 1 MG/DL (ref 0.5–1.4)
CRP SERPL-MCNC: 11.8 MG/L (ref 0–8.2)
DIFFERENTIAL METHOD: ABNORMAL
EOSINOPHIL # BLD AUTO: 0.1 K/UL (ref 0–0.5)
EOSINOPHIL NFR BLD: 2.9 % (ref 0–8)
ERYTHROCYTE [DISTWIDTH] IN BLOOD BY AUTOMATED COUNT: 13.1 % (ref 11.5–14.5)
EST. GFR  (NO RACE VARIABLE): >60 ML/MIN/1.73 M^2
GLUCOSE SERPL-MCNC: 204 MG/DL (ref 70–110)
HCT VFR BLD AUTO: 35.1 % (ref 40–54)
HGB BLD-MCNC: 10.9 G/DL (ref 14–18)
IMM GRANULOCYTES # BLD AUTO: 0 K/UL (ref 0–0.04)
IMM GRANULOCYTES NFR BLD AUTO: 0 % (ref 0–0.5)
LYMPHOCYTES # BLD AUTO: 1.5 K/UL (ref 1–4.8)
LYMPHOCYTES NFR BLD: 31.5 % (ref 18–48)
MCH RBC QN AUTO: 29 PG (ref 27–31)
MCHC RBC AUTO-ENTMCNC: 31.1 G/DL (ref 32–36)
MCV RBC AUTO: 93 FL (ref 82–98)
MONOCYTES # BLD AUTO: 0.5 K/UL (ref 0.3–1)
MONOCYTES NFR BLD: 9.7 % (ref 4–15)
NEUTROPHILS # BLD AUTO: 2.7 K/UL (ref 1.8–7.7)
NEUTROPHILS NFR BLD: 55.3 % (ref 38–73)
NRBC BLD-RTO: 0 /100 WBC
PLATELET # BLD AUTO: 286 K/UL (ref 150–450)
PMV BLD AUTO: 10.5 FL (ref 9.2–12.9)
POTASSIUM SERPL-SCNC: 3.5 MMOL/L (ref 3.5–5.1)
PROT SERPL-MCNC: 7.2 G/DL (ref 6–8.4)
RBC # BLD AUTO: 3.76 M/UL (ref 4.6–6.2)
SODIUM SERPL-SCNC: 142 MMOL/L (ref 136–145)
WBC # BLD AUTO: 4.83 K/UL (ref 3.9–12.7)

## 2022-11-08 PROCEDURE — 36592 COLLECT BLOOD FROM PICC: CPT

## 2022-11-08 PROCEDURE — A4216 STERILE WATER/SALINE, 10 ML: HCPCS | Performed by: INTERNAL MEDICINE

## 2022-11-08 PROCEDURE — 25000003 PHARM REV CODE 250: Performed by: INTERNAL MEDICINE

## 2022-11-08 PROCEDURE — 85025 COMPLETE CBC W/AUTO DIFF WBC: CPT | Performed by: INTERNAL MEDICINE

## 2022-11-08 PROCEDURE — 63600175 PHARM REV CODE 636 W HCPCS: Performed by: INTERNAL MEDICINE

## 2022-11-08 PROCEDURE — 29581 APPL MULTLAYER CMPRN SYS LEG: CPT | Mod: 50

## 2022-11-08 PROCEDURE — 86140 C-REACTIVE PROTEIN: CPT | Performed by: INTERNAL MEDICINE

## 2022-11-08 PROCEDURE — 80053 COMPREHEN METABOLIC PANEL: CPT | Performed by: INTERNAL MEDICINE

## 2022-11-08 RX ORDER — HEPARIN 100 UNIT/ML
500 SYRINGE INTRAVENOUS
Status: DISCONTINUED | OUTPATIENT
Start: 2022-11-08 | End: 2022-11-08 | Stop reason: HOSPADM

## 2022-11-08 RX ORDER — SODIUM CHLORIDE 0.9 % (FLUSH) 0.9 %
10 SYRINGE (ML) INJECTION
Status: CANCELLED | OUTPATIENT
Start: 2022-11-08

## 2022-11-08 RX ORDER — SODIUM CHLORIDE 0.9 % (FLUSH) 0.9 %
10 SYRINGE (ML) INJECTION
Status: DISCONTINUED | OUTPATIENT
Start: 2022-11-08 | End: 2022-11-08 | Stop reason: HOSPADM

## 2022-11-08 RX ORDER — HEPARIN 100 UNIT/ML
500 SYRINGE INTRAVENOUS
Status: CANCELLED | OUTPATIENT
Start: 2022-11-08

## 2022-11-08 RX ADMIN — HEPARIN 500 UNITS: 100 SYRINGE at 01:11

## 2022-11-08 RX ADMIN — Medication 10 ML: at 01:11

## 2022-11-08 NOTE — PROGRESS NOTES
Pt arrived to infusion suite for blood draw from [facility] PICC line. Both the purple and red port are somewhat difficult to flush, but do flush and have positive blood return. Labs collected via the red port, flushed well with good blood return, 10 cc blood discarded, specimen collected sent to lab,  flushed with NS and heparin, tolerated well. The purple port was flushed with both normal saline and heparin. Sterile dressing change to site done, removed transparent dressing with biopatch in place, no redness or swelling noted, replaced dressing and biopath., tolerated well.  Left unit in NAD.Return appt provided.

## 2022-11-08 NOTE — PROGRESS NOTES
[facility]  [address] [place]  [address]  Nurse Visit    Subjective:       Patient seen in clinic today.  Dressing changed as ordered.      Assessment:          Wound 04/08/22 1137 Diabetic Ulcer Left plantar;medial Foot (Active)   04/08/22 1137    Pre-existing: Yes   Primary Wound Type: Diabetic ulc   Side: Left   Orientation: plantar;medial   Location: Foot   Wound Number:    Ankle-Brachial Index:    Pulses:    Removal Indication and Assessment:    Wound Outcome:    (Retired) Wound Type:    (Retired) Wound Length (cm):    (Retired) Wound Width (cm):    (Retired) Depth (cm):    Wound Description (Comments):    Removal Indications:    Wound WDL ex 11/08/22 1548   Dressing Appearance Dry;Intact 11/08/22 1548   Drainage Amount None 11/08/22 1548   Tissue loss description Partial thickness 11/08/22 1548   Periwound Area Intact 11/08/22 1548   Wound Edges Callused 11/08/22 1548   Care Cleansed with:;Soap and water;Sterile normal saline 11/08/22 1548   Dressing Changed 11/08/22 1548   Periwound Care Moisture barrier applied 11/08/22 1548   Compression Two layer compression 11/08/22 1548   Off Loading Football dressing;Off loading shoe 11/08/22 1548   Dressing Change Due 11/11/22 11/08/22 1548            Wound 05/06/22 1142 Diabetic Ulcer Right plantar Foot (Active)   05/06/22 1142    Pre-existing: Yes   Primary Wound Type: Diabetic ulc   Side: Right   Orientation: plantar   Location: Foot   Wound Number:    Ankle-Brachial Index:    Pulses:    Removal Indication and Assessment:    Wound Outcome:    (Retired) Wound Type:    (Retired) Wound Length (cm):    (Retired) Wound Width (cm):    (Retired) Depth (cm):    Wound Description (Comments):    Removal Indications:    Wound WDL ex 11/08/22 1545   Dressing Appearance Intact;Moist drainage 11/08/22 1545   Drainage Amount Large 11/08/22 1545   Drainage Characteristics/Odor Serosanguineous 11/08/22 1545   Appearance Red;Bone 11/08/22 1545    Tissue loss description Full thickness 11/08/22 1545   Periwound Area Pale white;Macerated 11/08/22 1545   Wound Edges Callused 11/08/22 1545   Care Cleansed with:;Soap and water;Sterile normal saline 11/08/22 1545   Dressing Changed 11/08/22 1545   Periwound Care Moisture barrier applied 11/08/22 1545   Compression Two layer compression 11/08/22 1545   Off Loading Football dressing;Off loading shoe 11/08/22 1545   Dressing Change Due 11/11/22 11/08/22 1545           Plan:     No orders of the defined types were placed in this encounter.          Follow up in about 3 days (around 11/11/2022) for MD wound care visit.

## 2022-11-09 RX ORDER — SEMAGLUTIDE 1.34 MG/ML
INJECTION, SOLUTION SUBCUTANEOUS
Qty: 3 PEN | Refills: 0 | OUTPATIENT
Start: 2022-11-09 | End: 2023-12-09

## 2022-11-11 ENCOUNTER — HOSPITAL ENCOUNTER (OUTPATIENT)
Dept: WOUND CARE | Facility: HOSPITAL | Age: 52
Discharge: HOME | End: 2022-11-11
Attending: PODIATRIST
Payer: MEDICARE

## 2022-11-11 VITALS — TEMPERATURE: 98 F | SYSTOLIC BLOOD PRESSURE: 175 MMHG | HEART RATE: 93 BPM | DIASTOLIC BLOOD PRESSURE: 97 MMHG

## 2022-11-11 DIAGNOSIS — E11.621 DIABETIC ULCER OF RIGHT MIDFOOT ASSOCIATED WITH TYPE 2 DIABETES MELLITUS, WITH FAT LAYER EXPOSED: Primary | ICD-10-CM

## 2022-11-11 DIAGNOSIS — L97.529 DIABETIC ULCER OF LEFT FOOT: ICD-10-CM

## 2022-11-11 DIAGNOSIS — E11.621 DIABETIC ULCER OF LEFT FOOT: ICD-10-CM

## 2022-11-11 DIAGNOSIS — L97.412 DIABETIC ULCER OF RIGHT MIDFOOT ASSOCIATED WITH TYPE 2 DIABETES MELLITUS, WITH FAT LAYER EXPOSED: Primary | ICD-10-CM

## 2022-11-11 PROCEDURE — 11042 DBRDMT SUBQ TIS 1ST 20SQCM/<: CPT | Mod: ,,, | Performed by: PODIATRIST

## 2022-11-11 PROCEDURE — 11042 DBRDMT SUBQ TIS 1ST 20SQCM/<: CPT | Performed by: PODIATRIST

## 2022-11-11 PROCEDURE — 99499 UNLISTED E&M SERVICE: CPT | Mod: ,,, | Performed by: PODIATRIST

## 2022-11-11 PROCEDURE — 11042 WOUND DEBRIDEMENT: ICD-10-PCS | Mod: ,,, | Performed by: PODIATRIST

## 2022-11-11 PROCEDURE — 99499 NO LOS: ICD-10-PCS | Mod: ,,, | Performed by: PODIATRIST

## 2022-11-11 NOTE — PROGRESS NOTES
[facility]                Progress Note    Subjective:       Patient ID: Indio Ryder Jr. is a 52 y.o. male.    Chief Complaint: Wound Care    F/u wound care visit. Patient ambulatory to exam room with no c/o pain or discomfort at present. Wound dressing to BLE intact with scant drainage to left foot wound and moderate drainage to right foot wound. Wound to left plantar foot measuring smaller in all dimensions.     Review of Systems   Constitutional:  Negative for appetite change, chills, fatigue and fever.   HENT:  Negative for hearing loss.    Eyes:  Negative for photophobia and visual disturbance.   Respiratory:  Negative for cough, chest tightness, shortness of breath and wheezing.    Cardiovascular:  Positive for leg swelling. Negative for chest pain and palpitations.   Gastrointestinal:  Negative for constipation, diarrhea, nausea and vomiting.   Endocrine: Negative for cold intolerance and heat intolerance.   Genitourinary:  Negative for flank pain.   Musculoskeletal:  Positive for gait problem and myalgias. Negative for neck pain and neck stiffness.   Skin:  Positive for wound. Negative for color change.   Neurological:  Positive for numbness. Negative for weakness, light-headedness and headaches.        + paresthesia    Psychiatric/Behavioral:  Negative for sleep disturbance.        Objective:        Physical Exam  Constitutional:       Appearance: He is well-developed.   Cardiovascular:      Comments: Dorsalis pedis and posterior tibial pulses are palpable Skin is atrophic, slightly hyperpigmented, and mildly edematous      Musculoskeletal:         General: No tenderness. Normal range of motion.      Comments: Muscle strength is 5/5 in all groups bilaterally.    S/p partial 5th ray amp b/l    S/p hallux amp right    Fat pad atrophy to heels and met heads bilateral       Skin:     General: Skin is warm and dry.      Coloration: Skin is not jaundiced,  mottled or sallow.      Findings: Wound (see below) present. No abscess or ecchymosis.      Comments:        Neurological:      Mental Status: He is alert and oriented to person, place, and time.      Comments: [place] 5.07 monofilamant testing is diminished Kenji feet. Sharp/dull sensation diminished Bilaterally. Light touch absent Bilaterally.       Psychiatric:         Behavior: Behavior normal.       Vitals:    11/11/22 1114   BP: (!) 175/97   Pulse: 93   Temp: 98.2 °F (36.8 °C)       Assessment:           ICD-10-CM ICD-9-CM   1. Diabetic ulcer of right midfoot associated with type 2 diabetes mellitus, with fat layer exposed  E11.621 250.80    L97.412 707.14   2. Diabetic ulcer of left foot  E11.621 250.80    L97.529 707.15            Wound 04/08/22 1137 Diabetic Ulcer Left plantar;medial Foot (Active)   04/08/22 1137    Pre-existing: Yes   Primary Wound Type: Diabetic ulc   Side: Left   Orientation: plantar;medial   Location: Foot   Wound Number:    Ankle-Brachial Index:    Pulses:    Removal Indication and Assessment:    Wound Outcome:    (Retired) Wound Type:    (Retired) Wound Length (cm):    (Retired) Wound Width (cm):    (Retired) Depth (cm):    Wound Description (Comments):    Removal Indications:    Wound Image   11/11/22 1116   Wound WDL ex 11/11/22 1116   Dressing Appearance Intact;Dry 11/11/22 1116   Drainage Amount Scant 11/11/22 1116   Drainage Characteristics/Odor Serosanguineous 11/11/22 1116   Appearance Red 11/11/22 1116   Tissue loss description Partial thickness 11/11/22 1116   Black (%), Wound Tissue Color 0 % 11/11/22 1116   Red (%), Wound Tissue Color 100 % 11/11/22 1116   Yellow (%), Wound Tissue Color 0 % 11/11/22 1116   Periwound Area Dry;Intact 11/11/22 1116   Wound Edges Callused 11/11/22 1116   Wound Length (cm) 0.2 cm 11/11/22 1116   Wound Width (cm) 0.2 cm 11/11/22 1116   Wound Depth (cm) 0.1 cm 11/11/22 1116   Wound Volume (cm^3) 0.004 cm^3 11/11/22 1116   Wound Surface Area  (cm^2) 0.04 cm^2 11/11/22 1116   Care Cleansed with:;Soap and water;Sterile normal saline 11/11/22 1116   Dressing Changed 11/11/22 1116   Periwound Care Moisture barrier applied 11/11/22 1116   Compression Two layer compression 11/11/22 1116   Off Loading Football dressing;Off loading shoe 11/11/22 1116   Dressing Change Due 11/15/22 11/11/22 1116            Wound 05/06/22 1142 Diabetic Ulcer Right plantar Foot (Active)   05/06/22 1142    Pre-existing: Yes   Primary Wound Type: Diabetic ulc   Side: Right   Orientation: plantar   Location: Foot   Wound Number:    Ankle-Brachial Index:    Pulses:    Removal Indication and Assessment:    Wound Outcome:    (Retired) Wound Type:    (Retired) Wound Length (cm):    (Retired) Wound Width (cm):    (Retired) Depth (cm):    Wound Description (Comments):    Removal Indications:    Wound Image   11/11/22 1116   Wound WDL ex 11/11/22 1116   Dressing Appearance Dry;Intact 11/11/22 1116   Drainage Amount Moderate 11/11/22 1116   Drainage Characteristics/Odor Serosanguineous 11/11/22 1116   Appearance Red 11/11/22 1116   Tissue loss description Partial thickness 11/11/22 1116   Black (%), Wound Tissue Color 0 % 11/11/22 1116   Red (%), Wound Tissue Color 100 % 11/11/22 1116   Yellow (%), Wound Tissue Color 0 % 11/11/22 1116   Periwound Area Macerated 11/11/22 1116   Wound Edges Callused 11/11/22 1116   Wound Length (cm) 1.1 cm 11/11/22 1116   Wound Width (cm) 0.2 cm 11/11/22 1116   Wound Depth (cm) 0.5 cm 11/11/22 1116   Wound Volume (cm^3) 0.11 cm^3 11/11/22 1116   Wound Surface Area (cm^2) 0.22 cm^2 11/11/22 1116   Tunneling (depth (cm)/location) 0 11/11/22 1116   Undermining (depth (cm)/location) 0.4 12:00-6:00 11/11/22 1116   Care Cleansed with:;Antimicrobial agent;Sterile normal saline 11/11/22 1116   Dressing Changed 11/11/22 1116   Periwound Care Moisture barrier applied 11/11/22 1116   Compression Two layer compression 11/11/22 1116   Off Loading Football dressing;Off  loading shoe 11/11/22 1116   Dressing Change Due 11/15/22 11/11/22 1116           Plan:              Wound Debridement    Date/Time: 11/11/2022 10:58 AM  Performed by: Marivel Peterson DPM  Authorized by: Marviel Peterson DPM     Consent Done?:  Yes (Written)  Local anesthesia used?: No      Wound Details:    Location:  Right foot    Location:  Right Plantar    Type of Debridement:  Excisional       Length (cm):  1.1       Area (sq cm):  0.22       Width (cm):  0.2       Percent Debrided (%):  100       Depth (cm):  0.5       Total Area Debrided (sq cm):  0.22    Depth of debridement:  Subcutaneous tissue    Tissue debrided:  Dermis, Epidermis and Subcutaneous    Devitalized tissue debrided:  Callus and Fibrin    Instruments:  Curette    Bleeding:  Minimal  Hemostasis Achieved: Yes    Method Used:  Pressure  Patient tolerance:  Patient tolerated the procedure well with no immediate complications    Wound to right plantar foot measuring 0.1cm larger length, 0.2cm smaller width and new undermining measuring 0.4cm at the 12-6 o'clock area of wound.     Wound care done per order.     RTC in one week for MD visit. Patient will have nurse visit on Tuesday 11/15/22.        Orders Placed This Encounter   Procedures    Debridement     This order was created via procedure documentation     Standing Status:   Standing     Number of Occurrences:   1    Change dressing     Clean with NS.   BILATERAL FEET: Gentian violet to periwound. Cavilon to plantar foot. Custom felt pad with small hole over wound to allow for drainage. [place] to wound bed. Drawtex, Mextra short x1, tim foam long x2. Football dressing (cast padding x3). Calamine Coflex.        Follow up in about 1 week (around 11/18/2022) for MD wound care visit.

## 2022-11-14 NOTE — PROGRESS NOTES
Subjective:      Patient ID: Indio Ryder Jr. is a 52 y.o. male.    Chief Complaint: Follow-up (Bilateral foot ulcer ) and Dressing Change (Foot ball   unna boot )    Indio is a 52 y.o. male who presents to the clinic for evaluation and treatment of high risk feet. 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), Colon adenocarcinoma (04/12/2022), Diabetic ketoacidosis without coma associated with type 2 diabetes mellitus (05/30/2017), Diabetic ulcer of right foot associated with type 2 diabetes mellitus (06/03/2015), Disorder of kidney and ureter, Essential hypertension (06/06/2013), Group B streptococcal infection (01/13/2017), Mixed hyperlipidemia (08/12/2014), Septic arthritis of left foot (04/28/2021), Shoulder impingement (08/12/2014), Subacute osteomyelitis of right foot (01/12/2017), Traumatic amputation of fifth toe of right foot (07/02/2015), and Type 2 diabetes mellitus with diabetic neuropathy, with long-term current use of insulin (05/03/2016). The patient's chief complaint is diabetic foot ulcer, b/l feet. Currently managed by Dr Peterson. No issues w/ wound care. Abx as per ID  This patient has documented high risk feet requiring routine maintenance secondary to diabetes mellitis and those secondary complications of diabetes, as mentioned..      PCP: Lorena Thakur MD    Date Last Seen by PCP:   Chief Complaint   Patient presents with    Follow-up     Bilateral foot ulcer     Dressing Change     Foot ball   unna boot        Hemoglobin A1C   Date Value Ref Range Status   07/01/2022 13.3 (H) 4.0 - 5.6 % Final     Comment:     ADA Screening Guidelines:  5.7-6.4%  Consistent with prediabetes  >or=6.5%  Consistent with diabetes    High levels of fetal hemoglobin interfere with the HbA1C  assay. Heterozygous hemoglobin variants (HbS, HgC, etc)do  not significantly interfere with this assay.   However, presence of multiple variants may affect accuracy.     09/15/2021 8.1 (H)  4.0 - 5.6 % Final     Comment:     ADA Screening Guidelines:  5.7-6.4%  Consistent with prediabetes  >or=6.5%  Consistent with diabetes    High levels of fetal hemoglobin interfere with the HbA1C  assay. Heterozygous hemoglobin variants (HbS, HgC, etc)do  not significantly interfere with this assay.   However, presence of multiple variants may affect accuracy.     07/14/2021 10.1 (H) 4.0 - 5.6 % Final     Comment:     ADA Screening Guidelines:  5.7-6.4%  Consistent with prediabetes  >or=6.5%  Consistent with diabetes    High levels of fetal hemoglobin interfere with the HbA1C  assay. Heterozygous hemoglobin variants (HbS, HgC, etc)do  not significantly interfere with this assay.   However, presence of multiple variants may affect accuracy.         Review of Systems   Constitutional: Negative for chills, decreased appetite and fever.   Cardiovascular:  Positive for leg swelling (chronic).   Skin:  Positive for color change, dry skin, nail changes and poor wound healing. Negative for flushing and itching.   Musculoskeletal:  Negative for arthritis, back pain, joint pain, joint swelling and myalgias.   Gastrointestinal:  Negative for nausea and vomiting.   Neurological:  Positive for numbness, paresthesias and sensory change. Negative for loss of balance.         Patient Active Problem List   Diagnosis    Essential hypertension    Mixed hyperlipidemia    Traumatic amputation of fifth toe of right foot    Type 2 diabetes mellitus with hyperglycemia, with long-term current use of insulin    Actinomyces infection    Hypokalemia    Neck pain    Diabetic ulcer of right midfoot associated with diabetes mellitus due to underlying condition, with fat layer exposed    Severe malnutrition    Hypertensive retinopathy, bilateral    Allergic reaction due to antibacterial drug    Chronic left shoulder pain    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 30 tablet 0    atorvastatin (LIPITOR) 80 MG tablet Take 1 tablet (80 mg total) by mouth once daily. 90 tablet 3    diphenoxylate-atropine 2.5-0.025 mg (LOMOTIL) 2.5-0.025 mg per tablet Take 1 to 2 tablets by mouth three times daily as needed for diarrhea 40 tablet 0    insulin aspart U-100 (NOVOLOG) 100 unit/mL (3 mL) InPn pen Inject 8 units before meals plus scale 150-200+2, 201-250+4, 251-300+6, 301-350+8, >350+10. Max daily 54 units. 15 mL 6    insulin detemir U-100 (LEVEMIR FLEXTOUCH) 100 unit/mL (3 mL) SubQ InPn pen Inject 26 Units into the skin every evening. 15 mL 6    metFORMIN (GLUCOPHAGE-XR) 500 MG ER 24hr tablet Take 1 to 2 tablets by mouth twice daily 360 tablet 1    ondansetron (ZOFRAN-ODT) 8 MG TbDL Dissolve 1 tablet (8 mg total) by mouth every 8 (eight) hours as needed (Nausea). 21 tablet 0    ONETOUCH DELICA LANCETS 33 gauge [facility] [place] [facility] to [facility]       ONETOUCH ULTRA2 kit       pantoprazole (PROTONIX) 40 MG tablet Take 1 tablet (40 mg total) by mouth once daily. 30 tablet 11    pen needle, diabetic (BD SASCHA 2ND GEN PEN NEEDLE) 32 gauge x 5/32" Ndle Use 4 times a day (Patient taking differently: Use 4 times a day) 400 each 3    permethrin (ELIMITE) 5 % cream Apply neck down at night for 1 application. Wash off in morning and Repeat in 1 week 60 g 1    semaglutide (OZEMPIC) 0.25 mg or 0.5 mg(2 mg/1.5 mL) pen injector Inject 0.25 mg into the skin every 7 days for 30 days, THEN 0.5 mg every 7 days. 3 pen 0    tamsulosin (FLOMAX) 0.4 mg Cap Take 2 capsules (0.8 mg total) by " mouth once daily. for 14 days 28 capsule 0     Current Facility-Administered Medications on File Prior to Visit   Medication Dose Route Frequency Provider Last Rate Last Admin    heparin, porcine (PF) 100 unit/mL injection flush 10 Units  10 Units Intravenous PRN Esthela Diggs MD   500 Units at 05/19/21 1308       Review of patient's allergies indicates:   Allergen Reactions    Non-aspirin severe allergy        Past Surgical History:   Procedure Laterality Date    COLONOSCOPY Right 1/19/2022    Procedure: COLONOSCOPY;  Surgeon: Tj Haile MD;  Location: [facility] ENDO (4TH FLR);  Service: Endoscopy;  Laterality: Right;  previous order un-usable/poor prep 1/18/22  RAPID COVID test arrival 12:20  instructions handed to pt- sm    COLONOSCOPY N/A 3/21/2022    Procedure: COLONOSCOPY;  Surgeon: Sheng Haque MD;  Location: [facility] ENDO (2ND FLR);  Service: Endoscopy;  Laterality: N/A;  Colonoscopy with AES for hepatix flexure polyp removal, 2nd floor  Pt is fully vaccinated-DS  Pt prescribed Plavix by Dr. Stahl in Jan. 2022, however pt states that he never started taking it-DS  3/16/22-Instructions sent via portal-DS    DEBRIDEMENT OF FOOT Left 7/14/2019    Procedure: DEBRIDEMENT, FOOT, with left 5th ray amputation;  Surgeon: Sis Hickman DPM;  Location: [facility] OR 2ND FLR;  Service: Podiatry;  Laterality: Left;    DEBRIDEMENT OF FOOT Left 7/17/2019    Procedure: DEBRIDEMENT, FOOT with leftt 5th ray partial amputation with Neox Graft;  Surgeon: Mai Burrell DPM;  Location: [facility] OR 2ND FLR;  Service: Podiatry;  Laterality: Left;  t    DEBRIDEMENT OF FOOT Bilateral 4/29/2021    Procedure: DEBRIDEMENT, FOOT;  Surgeon: Mai Burrell DPM;  Location: [facility] OR 1ST FLR;  Service: Podiatry;  Laterality: Bilateral;  Graft application    TOE AMPUTATION Right 06/05/2015    TOE AMPUTATION Right 01/19/2017    XI ROBOTIC COLECTOMY, RIGHT N/A 4/25/2022    Procedure: XI ROBOTIC COLECTOMY, RIGHT (lithotomy, eras  "low);  Surgeon: Erik Stout MD;  [facility];  Service: Colon and Rectal;  Laterality: N/A;  Paruch to [place]    XI ROBOTIC COLECTOMY, RIGHT  4/25/2022    Procedure: ;  Surgeon: Erik Stout MD;  Location: [facility] OR 2ND FLR;  Service: Colon and Rectal;;       Family History   Adopted: Yes   Problem Relation Age of Onset    Hypertension Mother     Cancer Mother         breast    Diabetes Mother     Hypertension Father     Cataracts Father     Heart disease Father         mi    Diabetes Sister     No Known Problems Brother     Heart disease Sister         MI    No Known Problems Sister     Hypertension Maternal Aunt     No Known Problems Maternal Uncle     No Known Problems Paternal Aunt     No Known Problems Paternal Uncle     No Known Problems Maternal Grandmother     No Known Problems Maternal Grandfather     No Known Problems Paternal Grandmother     No Known Problems Paternal Grandfather     Amblyopia Neg Hx     Blindness Neg Hx     Glaucoma Neg Hx     Macular degeneration Neg Hx     Retinal detachment Neg Hx     Strabismus Neg Hx     Stroke Neg Hx     Thyroid disease Neg Hx        Social History     Socioeconomic History    Marital status: Single    Number of children: 0   Occupational History    Occupation: Retired     Employer: [employer]   Tobacco Use    Smoking status: Never    Smokeless tobacco: Never   Substance and Sexual Activity    Alcohol use: No    Drug use: No    Sexual activity: Yes     Partners: Female     Birth control/protection: Condom               Objective:      Vitals:    10/20/22 1314   Resp: 18   Weight: 109.8 kg (242 lb)   Height: 6' 1" (1.854 m)   PainSc: 0-No pain        Physical Exam  Constitutional:       Appearance: He is well-developed.   Cardiovascular:      Comments: Dorsalis pedis and posterior tibial pulses are palpable Skin is atrophic, slightly hyperpigmented, and mildly edematous      Musculoskeletal:         General: No tenderness. Normal " range of motion.      Comments: Adequate joint range of motion without pain, limitation, nor crepitation Bilateral feet and ankle joints. Muscle strength is 5/5 in all groups bilaterally.  S/p partial 5th ray amp b/l       Skin:     General: Skin is warm and dry.      Coloration: Skin is ashen. Skin is not jaundiced, mottled or sallow.      Findings: No abscess, ecchymosis, erythema or lesion.      Comments:        Neurological:      Mental Status: He is alert and oriented to person, place, and time.      Comments: [place]enita 5.07 monofilamant testing is diminished Kenji feet. Sharp/dull sensation diminished Bilaterally. Light touch absent Bilaterally.       Psychiatric:         Behavior: Behavior normal.                   Ulcer location:  left, plantar foot   Pre debridement Measurements: 0.2x0.3x0.1 cm   Post debridement Measurements : 0.3x0.3x0.1 cm   Signs of infection: No  Erythema: No  Undermining: No  Tunneling: No  Drainage: serosanguinous  Periwound skin: intact  Wound Base: granular    Ulcer location:  right, plantar foot   Pre debridement Measurements: 1.7x0.7x0.5  Post debridement Measurements : 1.7x0.7x0.8 cm   Signs of infection: Yes--> deep probe to bone with exposed tendon  Erythema: No  Undermining: No  Tunneling: Yes  Drainage: Bloody  Periwound skin: intact  Wound Base: fibrogranular        Assessment:       Encounter Diagnoses   Name Primary?    Diabetic ulcer of right midfoot associated with type 2 diabetes mellitus, with muscle involvement without evidence of necrosis Yes    Acute osteomyelitis of metatarsal bone of right foot     Diabetic ulcer of left midfoot associated with type 2 diabetes mellitus, with fat layer exposed          Plan:       Indio was seen today for follow-up and dressing change.    Diagnoses and all orders for this visit:    Diabetic ulcer of right midfoot associated with type 2 diabetes mellitus, with muscle involvement without evidence of necrosis    Acute  osteomyelitis of metatarsal bone of right foot    Diabetic ulcer of left midfoot associated with type 2 diabetes mellitus, with fat layer exposed    I counseled the patient on his conditions, their implications and medical management.    B/l chronic foot ulcerations.     Debridement: With verbal consent, nonviable tissues on the bilateral foot were debrided Beyond subq including tendons  utilizing a  sterile No. 3 scalpel and forceps. Minimal bleeding controlled with direct pressure  The patient tolerated this well.     Dressings: [place]  Offloading:Foam football with calamine coflex gabe    Follow-up:due to complexities of wounds, Patient is to return to wound for follow-up but should call [facility] immediately if any signs of infection, such as fever, chills, sweats, increased redness or pain.    Short-term goals include maintaining good offloading and minimizing bioburden, promoting granulation and epithelialization to healing.  Long-term goals include keeping the wound healed by good offloading and medical management under the direction of internist.

## 2022-11-15 ENCOUNTER — INFUSION (OUTPATIENT)
Dept: INFECTIOUS DISEASES | Facility: HOSPITAL | Age: 52
End: 2022-11-15
Attending: INTERNAL MEDICINE
Payer: MEDICARE

## 2022-11-15 ENCOUNTER — TELEPHONE (OUTPATIENT)
Dept: WOUND CARE | Facility: HOSPITAL | Age: 52
End: 2022-11-15
Payer: MEDICARE

## 2022-11-15 DIAGNOSIS — M86.60 ACUTE ON CHRONIC OSTEOMYELITIS: Primary | ICD-10-CM

## 2022-11-15 DIAGNOSIS — M86.10 ACUTE ON CHRONIC OSTEOMYELITIS: Primary | ICD-10-CM

## 2022-11-15 DIAGNOSIS — M86.9 OSTEOMYELITIS, UNSPECIFIED SITE, UNSPECIFIED TYPE: ICD-10-CM

## 2022-11-15 LAB
ALBUMIN SERPL BCP-MCNC: 3 G/DL (ref 3.5–5.2)
ALP SERPL-CCNC: 211 U/L (ref 55–135)
ALT SERPL W/O P-5'-P-CCNC: 14 U/L (ref 10–44)
ANION GAP SERPL CALC-SCNC: 9 MMOL/L (ref 8–16)
AST SERPL-CCNC: 16 U/L (ref 10–40)
BASOPHILS # BLD AUTO: 0.03 K/UL (ref 0–0.2)
BASOPHILS NFR BLD: 0.6 % (ref 0–1.9)
BILIRUB SERPL-MCNC: 0.5 MG/DL (ref 0.1–1)
BUN SERPL-MCNC: 15 MG/DL (ref 6–20)
CALCIUM SERPL-MCNC: 9.8 MG/DL (ref 8.7–10.5)
CHLORIDE SERPL-SCNC: 100 MMOL/L (ref 95–110)
CO2 SERPL-SCNC: 28 MMOL/L (ref 23–29)
CREAT SERPL-MCNC: 1.2 MG/DL (ref 0.5–1.4)
CRP SERPL-MCNC: 2.5 MG/L (ref 0–8.2)
DIFFERENTIAL METHOD: ABNORMAL
EOSINOPHIL # BLD AUTO: 0.2 K/UL (ref 0–0.5)
EOSINOPHIL NFR BLD: 3 % (ref 0–8)
ERYTHROCYTE [DISTWIDTH] IN BLOOD BY AUTOMATED COUNT: 13.1 % (ref 11.5–14.5)
EST. GFR  (NO RACE VARIABLE): >60 ML/MIN/1.73 M^2
GLUCOSE SERPL-MCNC: 271 MG/DL (ref 70–110)
HCT VFR BLD AUTO: 36.5 % (ref 40–54)
HGB BLD-MCNC: 11.5 G/DL (ref 14–18)
IMM GRANULOCYTES # BLD AUTO: 0.01 K/UL (ref 0–0.04)
IMM GRANULOCYTES NFR BLD AUTO: 0.2 % (ref 0–0.5)
LYMPHOCYTES # BLD AUTO: 1.6 K/UL (ref 1–4.8)
LYMPHOCYTES NFR BLD: 30.2 % (ref 18–48)
MCH RBC QN AUTO: 29 PG (ref 27–31)
MCHC RBC AUTO-ENTMCNC: 31.5 G/DL (ref 32–36)
MCV RBC AUTO: 92 FL (ref 82–98)
MONOCYTES # BLD AUTO: 0.3 K/UL (ref 0.3–1)
MONOCYTES NFR BLD: 5.8 % (ref 4–15)
NEUTROPHILS # BLD AUTO: 3.2 K/UL (ref 1.8–7.7)
NEUTROPHILS NFR BLD: 60.2 % (ref 38–73)
NRBC BLD-RTO: 0 /100 WBC
PLATELET # BLD AUTO: 337 K/UL (ref 150–450)
PMV BLD AUTO: 10.8 FL (ref 9.2–12.9)
POTASSIUM SERPL-SCNC: 3.8 MMOL/L (ref 3.5–5.1)
PROT SERPL-MCNC: 7.6 G/DL (ref 6–8.4)
RBC # BLD AUTO: 3.97 M/UL (ref 4.6–6.2)
SODIUM SERPL-SCNC: 137 MMOL/L (ref 136–145)
WBC # BLD AUTO: 5.33 K/UL (ref 3.9–12.7)

## 2022-11-15 PROCEDURE — 96523 IRRIG DRUG DELIVERY DEVICE: CPT

## 2022-11-15 PROCEDURE — 36415 COLL VENOUS BLD VENIPUNCTURE: CPT | Performed by: INTERNAL MEDICINE

## 2022-11-15 PROCEDURE — 85025 COMPLETE CBC W/AUTO DIFF WBC: CPT | Performed by: INTERNAL MEDICINE

## 2022-11-15 PROCEDURE — 80053 COMPREHEN METABOLIC PANEL: CPT | Performed by: INTERNAL MEDICINE

## 2022-11-15 PROCEDURE — 36592 COLLECT BLOOD FROM PICC: CPT

## 2022-11-15 PROCEDURE — 86140 C-REACTIVE PROTEIN: CPT | Performed by: INTERNAL MEDICINE

## 2022-11-15 RX ORDER — HEPARIN 100 UNIT/ML
500 SYRINGE INTRAVENOUS
Status: CANCELLED | OUTPATIENT
Start: 2022-11-15

## 2022-11-15 RX ORDER — HEPARIN 100 UNIT/ML
500 SYRINGE INTRAVENOUS
Status: DISCONTINUED | OUTPATIENT
Start: 2022-11-15 | End: 2022-11-15 | Stop reason: HOSPADM

## 2022-11-15 RX ORDER — SODIUM CHLORIDE 0.9 % (FLUSH) 0.9 %
10 SYRINGE (ML) INJECTION
Status: CANCELLED | OUTPATIENT
Start: 2022-11-15

## 2022-11-15 RX ORDER — SODIUM CHLORIDE 0.9 % (FLUSH) 0.9 %
10 SYRINGE (ML) INJECTION
Status: DISCONTINUED | OUTPATIENT
Start: 2022-11-15 | End: 2022-11-15 | Stop reason: HOSPADM

## 2022-11-15 NOTE — PROGRESS NOTES
Pt arrived to infusion suite for blood draw from [facility] PICC line. Both the purple and red port are somewhat difficult to flush, but do flush and have positive blood return. Labs collected via the red port, flushed well with good blood return, 10 cc blood discarded, specimen collected sent to lab,  flushed with NS and heparin, tolerated well. The purple port was flushed with both normal saline and heparin. Sterile dressing change to site done, removed transparent dressing with biopatch in place, no redness or swelling noted, replaced dressing and biopath., tolerated well.  Left unit in NAD.Return appt provided.

## 2022-11-16 ENCOUNTER — HOSPITAL ENCOUNTER (OUTPATIENT)
Dept: WOUND CARE | Facility: HOSPITAL | Age: 52
Discharge: HOME | End: 2022-11-16
Attending: FAMILY MEDICINE
Payer: MEDICARE

## 2022-11-16 VITALS
TEMPERATURE: 98 F | HEART RATE: 96 BPM | DIASTOLIC BLOOD PRESSURE: 93 MMHG | RESPIRATION RATE: 20 BRPM | SYSTOLIC BLOOD PRESSURE: 175 MMHG

## 2022-11-16 DIAGNOSIS — E11.621 DIABETIC ULCER OF RIGHT MIDFOOT ASSOCIATED WITH TYPE 2 DIABETES MELLITUS, WITH FAT LAYER EXPOSED: Primary | ICD-10-CM

## 2022-11-16 DIAGNOSIS — L97.412 DIABETIC ULCER OF RIGHT MIDFOOT ASSOCIATED WITH TYPE 2 DIABETES MELLITUS, WITH FAT LAYER EXPOSED: Primary | ICD-10-CM

## 2022-11-16 PROCEDURE — 29581 APPL MULTLAYER CMPRN SYS LEG: CPT

## 2022-11-16 NOTE — PROGRESS NOTES
[facility] [address] [place]  [address]  Nurse Visit    Subjective:       Patient seen in clinic today.  Dressing changed as ordered.      Assessment:          Wound 04/08/22 1137 Diabetic Ulcer Left plantar;medial Foot (Active)   04/08/22 1137    Pre-existing: Yes   Primary Wound Type: Diabetic ulc   Side: Left   Orientation: plantar;medial   Location: Foot   Wound Number:    Ankle-Brachial Index:    Pulses:    Removal Indication and Assessment:    Wound Outcome:    (Retired) Wound Type:    (Retired) Wound Length (cm):    (Retired) Wound Width (cm):    (Retired) Depth (cm):    Wound Description (Comments):    Removal Indications:    Wound WDL ex 11/16/22 1642   Dressing Appearance Intact 11/16/22 1642   Dressing Change Due 11/18/22 11/16/22 1642            Wound 05/06/22 1142 Diabetic Ulcer Right plantar Foot (Active)   05/06/22 1142    Pre-existing: Yes   Primary Wound Type: Diabetic ulc   Side: Right   Orientation: plantar   Location: Foot   Wound Number:    Ankle-Brachial Index:    Pulses:    Removal Indication and Assessment:    Wound Outcome:    (Retired) Wound Type:    (Retired) Wound Length (cm):    (Retired) Wound Width (cm):    (Retired) Depth (cm):    Wound Description (Comments):    Removal Indications:    Wound WDL ex 11/16/22 1642   Dressing Appearance Intact;Moist drainage 11/16/22 1642   Drainage Amount Moderate 11/16/22 1642   Drainage Characteristics/Odor Serosanguineous 11/16/22 1642   Appearance Red;Bone 11/16/22 1642   Tissue loss description Full thickness 11/16/22 1642   Black (%), Wound Tissue Color 0 % 11/16/22 1642   Red (%), Wound Tissue Color 100 % 11/16/22 1642   Yellow (%), Wound Tissue Color 0 % 11/16/22 1642   Periwound Area Macerated;Pale white 11/16/22 1642   Wound Edges Defined 11/16/22 1642   Meza Classification (diabetic foot ulcers only) Grade 3 11/16/22 1642   Care Cleansed with:;Antimicrobial agent;Soap and water 11/16/22 1642   Dressing  Changed 11/16/22 1642   Periwound Care Moisture barrier applied;Skin barrier film applied 11/16/22 1642   Compression Two layer compression 11/16/22 1642   Off Loading Football dressing;Off loading shoe 11/16/22 1642   Dressing Change Due 11/18/22 11/16/22 1642           Plan:     No orders of the defined types were placed in this encounter.          Follow up in about 2 days (around 11/18/2022) for MD wound care visit.

## 2022-11-18 ENCOUNTER — HOSPITAL ENCOUNTER (OUTPATIENT)
Dept: WOUND CARE | Facility: HOSPITAL | Age: 52
Discharge: HOME | End: 2022-11-18
Attending: INTERNAL MEDICINE
Payer: MEDICARE

## 2022-11-18 VITALS — SYSTOLIC BLOOD PRESSURE: 179 MMHG | HEART RATE: 89 BPM | DIASTOLIC BLOOD PRESSURE: 98 MMHG | TEMPERATURE: 98 F

## 2022-11-18 DIAGNOSIS — L97.529 DIABETIC ULCER OF LEFT FOOT: ICD-10-CM

## 2022-11-18 DIAGNOSIS — E11.621 DIABETIC ULCER OF RIGHT MIDFOOT ASSOCIATED WITH TYPE 2 DIABETES MELLITUS, WITH BONE INVOLVEMENT WITHOUT EVIDENCE OF NECROSIS: Primary | ICD-10-CM

## 2022-11-18 DIAGNOSIS — E11.621 DIABETIC ULCER OF LEFT FOOT: ICD-10-CM

## 2022-11-18 DIAGNOSIS — L97.416 DIABETIC ULCER OF RIGHT MIDFOOT ASSOCIATED WITH TYPE 2 DIABETES MELLITUS, WITH BONE INVOLVEMENT WITHOUT EVIDENCE OF NECROSIS: Primary | ICD-10-CM

## 2022-11-18 PROCEDURE — 11042 WOUND DEBRIDEMENT: ICD-10-PCS | Mod: ,,, | Performed by: INTERNAL MEDICINE

## 2022-11-18 PROCEDURE — 99214 PR OFFICE/OUTPT VISIT, EST, LEVL IV, 30-39 MIN: ICD-10-PCS | Mod: 25,,, | Performed by: INTERNAL MEDICINE

## 2022-11-18 PROCEDURE — 11042 DBRDMT SUBQ TIS 1ST 20SQCM/<: CPT | Performed by: INTERNAL MEDICINE

## 2022-11-18 PROCEDURE — 11042 DBRDMT SUBQ TIS 1ST 20SQCM/<: CPT | Mod: ,,, | Performed by: INTERNAL MEDICINE

## 2022-11-18 PROCEDURE — 99214 OFFICE O/P EST MOD 30 MIN: CPT | Mod: 25,,, | Performed by: INTERNAL MEDICINE

## 2022-11-18 NOTE — PROCEDURES
"Wound Debridement    Date/Time: 11/18/2022 9:55 AM  Performed by: Desmond Villarreal MD  Authorized by: Desmond Villarreal MD     Time out: Immediately prior to procedure a "time out" was called to verify the correct patient, procedure, equipment, support staff and site/side marked as required.    Consent Done?:  Yes (Verbal)    Preparation: Patient was prepped and draped in usual sterile fashion    Local anesthesia used?: Yes    Local anesthetic:  Topical anesthetic    Wound Details:    Location:  Right foot    Location:  Right Plantar    Type of Debridement:  Excisional       Length (cm):  0.8       Area (sq cm):  0.16       Width (cm):  0.2       Percent Debrided (%):  100       Depth (cm):  1.2       Total Area Debrided (sq cm):  0.16    Depth of debridement:  Subcutaneous tissue    Tissue debrided:  Subcutaneous    Devitalized tissue debrided:  Biofilm, Callus and Fibrin    Instruments:  Curette    Bleeding:  Minimal  Hemostasis Achieved: Yes    Method Used:  Pressure  Patient tolerance:  Patient tolerated the procedure well with no immediate complications  "

## 2022-11-18 NOTE — PROGRESS NOTES
[facility]                Progress Note    Subjective:       Patient ID: Indio Ryder Jr. is a 52 y.o. male.    Chief Complaint: Wound Check    F/u wound care visit. Patient ambulatory to exam room with Darco shoe on right foot and CAM boot on left foot. Patient denies pain or discomfort at present. Wound dressing to BLE intact with small drainage noted to left foot and larger drainage noted to right foot. Wound to left plantar foot unchanged, measuring the same dimensions. Wound to right plantar foot measuring 0.3 larger length, 1.4cm increased depth and tunneling noted at 6 o'clock  measuring 0.7cm. Wound care done per order. RTC on Tuesday 11/22/22 for nurse visit and on Friday 11/25/22 for MD visit.    HBO evaluation:    51 y/o w/ DM (last a1c  July 1 2022= 13.3%) on insulin therapy with right foot osteomyelitis as evidenced by bone biopsy doen Oct 12 2022 with klebsiella pneumonia on IV meropenem. Had arterial studies of lower extremities in Jan 2022 without significant stenosis. He has been folowed in wound care clinic and undergone weekly debridements of callous and non viable tissue. Despite maximum medical therapy wounds persist no history of pneumothorax no sinus/ear issues/surgery    Dressing has remained dry no strike through drainage no periwound pain    Review of Systems      Objective:        Physical Exam  Constitutional:       Appearance: Normal appearance.   HENT:      Head: Normocephalic and atraumatic.      Right Ear: Tympanic membrane normal.      Left Ear: Tympanic membrane normal.   Eyes:      General: No scleral icterus.  Cardiovascular:      Pulses: Normal pulses.   Pulmonary:      Effort: Pulmonary effort is normal. No respiratory distress.      Breath sounds: No wheezing.   Musculoskeletal:      Right lower leg: No edema.      Left lower leg: No edema.   Skin:     Comments: Left plantar wound superficial no tunneling no induration or  expressible discharge base pin    Right plantar wound with periwound callous and white fibrin to perimeter no visible bone to wound depth see procedure note for debridement details   Neurological:      Mental Status: He is alert.       Vitals:    11/18/22 1027   BP: (!) 179/98   Pulse: 89   Temp: 98.1 °F (36.7 °C)       Assessment:           ICD-10-CM ICD-9-CM   1. Diabetic ulcer of right midfoot associated with type 2 diabetes mellitus, with bone involvement without evidence of necrosis  E11.621 250.80    L97.416 707.14   2. Diabetic ulcer of left foot  E11.621 250.80    L97.529 707.15            Wound 04/08/22 1137 Diabetic Ulcer Left plantar;medial Foot (Active)   04/08/22 1137    Pre-existing: Yes   Primary Wound Type: Diabetic ulc   Side: Left   Orientation: plantar;medial   Location: Foot   Wound Number:    Ankle-Brachial Index:    Pulses:    Removal Indication and Assessment:    Wound Outcome:    (Retired) Wound Type:    (Retired) Wound Length (cm):    (Retired) Wound Width (cm):    (Retired) Depth (cm):    Wound Description (Comments):    Removal Indications:    Wound Image   11/18/22 1018   Wound WDL ex 11/18/22 1018   Dressing Appearance Intact;Dried drainage 11/18/22 1018   Drainage Amount Small 11/18/22 1018   Drainage Characteristics/Odor Serosanguineous 11/18/22 1018   Appearance Red 11/18/22 1018   Tissue loss description Partial thickness 11/18/22 1018   Black (%), Wound Tissue Color 0 % 11/18/22 1018   Red (%), Wound Tissue Color 100 % 11/18/22 1018   Yellow (%), Wound Tissue Color 0 % 11/18/22 1018   Periwound Area Dry;Intact 11/18/22 1018   Wound Edges Callused 11/18/22 1018   Meza Classification (diabetic foot ulcers only) Grade 1 11/18/22 1018   Wound Length (cm) 0.2 cm 11/18/22 1018   Wound Width (cm) 0.2 cm 11/18/22 1018   Wound Depth (cm) 0.1 cm 11/18/22 1018   Wound Volume (cm^3) 0.004 cm^3 11/18/22 1018   Wound Surface Area (cm^2) 0.04 cm^2 11/18/22 1018   Care Cleansed with:;Soap and  water;Sterile normal saline 11/18/22 1018   Dressing Changed 11/18/22 1018   Periwound Care Moisture barrier applied 11/18/22 1018   Compression Two layer compression 11/18/22 1018   Off Loading Football dressing;Off loading shoe 11/18/22 1018   Dressing Change Due 11/25/22 11/18/22 1018            Wound 05/06/22 1142 Diabetic Ulcer Right plantar Foot (Active)   05/06/22 1142    Pre-existing: Yes   Primary Wound Type: Diabetic ulc   Side: Right   Orientation: plantar   Location: Foot   Wound Number:    Ankle-Brachial Index:    Pulses:    Removal Indication and Assessment:    Wound Outcome:    (Retired) Wound Type:    (Retired) Wound Length (cm):    (Retired) Wound Width (cm):    (Retired) Depth (cm):    Wound Description (Comments):    Removal Indications:    Wound Image   11/18/22 1018   Wound WDL ex 11/18/22 1018   Dressing Appearance Intact;Moist drainage 11/18/22 1018   Drainage Amount Large 11/18/22 1018   Drainage Characteristics/Odor Serosanguineous 11/18/22 1018   Appearance Red;Bone 11/18/22 1018   Tissue loss description Full thickness 11/18/22 1018   Black (%), Wound Tissue Color 0 % 11/18/22 1018   Red (%), Wound Tissue Color 100 % 11/18/22 1018   Yellow (%), Wound Tissue Color 0 % 11/18/22 1018   Periwound Area Pale white;Macerated 11/18/22 1018   Wound Edges Callused 11/18/22 1018   Meza Classification (diabetic foot ulcers only) Grade 3 11/18/22 1018   Wound Length (cm) 0.8 cm 11/18/22 1018   Wound Width (cm) 0.2 cm 11/18/22 1018   Wound Depth (cm) 1.9 cm 11/18/22 1018   Wound Volume (cm^3) 0.304 cm^3 11/18/22 1018   Wound Surface Area (cm^2) 0.16 cm^2 11/18/22 1018   Tunneling (depth (cm)/location) 0.7cm @ 6 o'clock 11/18/22 1018   Care Cleansed with:;Soap and water;Sterile normal saline 11/18/22 1018   Dressing Changed 11/18/22 1018   Periwound Care Moisture barrier applied 11/18/22 1018   Compression Two layer compression 11/18/22 1018   Off Loading Football dressing;Off loading shoe 11/18/22  1018   Dressing Change Due 11/22/22 11/18/22 1018           Plan:          Right plantar foot debridement done today continue offloading dressing    For hyperbaric qualification will plan repeat a1c with ID labs via picc next week along with pre albumin to monitor nutrition level. Needs repeat arterial studies to ensure adequate flow for wound healing. Folow up in wound clinic in one week      Orders Placed This Encounter   Procedures    Hemoglobin A1C     Standing Status:   Future     Standing Expiration Date:   11/18/2023    Prealbumin     Standing Status:   Future     Standing Expiration Date:   1/17/2024    Change dressing     Clean with NS.   BILATERAL FEET: Gentian violet to periwound. Cavilon to plantar foot. Custom felt pad with small hole over wound to allow for drainage. [place] to wound bed. Drawtex, Mextra short x1, tim foam long x2. Football dressing (cast padding x3). Calamine Coflex.    Ankle Brachial Indices (ALISSA)     Standing Status:   Future     Standing Expiration Date:   11/18/2023     Order Specific Question:   Exam Type:     Answer:   Resting     Order Specific Question:   Release to patient     Answer:   Immediate        Follow up in about 4 days (around 11/22/2022) for nurse visit.

## 2022-11-22 ENCOUNTER — INFUSION (OUTPATIENT)
Dept: INFECTIOUS DISEASES | Facility: HOSPITAL | Age: 52
End: 2022-11-22
Attending: INTERNAL MEDICINE
Payer: MEDICARE

## 2022-11-22 ENCOUNTER — HOSPITAL ENCOUNTER (OUTPATIENT)
Dept: WOUND CARE | Facility: HOSPITAL | Age: 52
Discharge: HOME | End: 2022-11-22
Attending: FAMILY MEDICINE
Payer: MEDICARE

## 2022-11-22 VITALS
SYSTOLIC BLOOD PRESSURE: 135 MMHG | TEMPERATURE: 98 F | HEART RATE: 102 BPM | DIASTOLIC BLOOD PRESSURE: 75 MMHG | RESPIRATION RATE: 20 BRPM

## 2022-11-22 DIAGNOSIS — E11.621 DIABETIC ULCER OF LEFT FOOT: ICD-10-CM

## 2022-11-22 DIAGNOSIS — M86.9 OSTEOMYELITIS, UNSPECIFIED SITE, UNSPECIFIED TYPE: ICD-10-CM

## 2022-11-22 DIAGNOSIS — L97.416 DIABETIC ULCER OF RIGHT MIDFOOT ASSOCIATED WITH TYPE 2 DIABETES MELLITUS, WITH BONE INVOLVEMENT WITHOUT EVIDENCE OF NECROSIS: Primary | ICD-10-CM

## 2022-11-22 DIAGNOSIS — E11.621 DIABETIC ULCER OF RIGHT MIDFOOT ASSOCIATED WITH TYPE 2 DIABETES MELLITUS, WITH BONE INVOLVEMENT WITHOUT EVIDENCE OF NECROSIS: Primary | ICD-10-CM

## 2022-11-22 DIAGNOSIS — E11.621 DIABETIC ULCER OF RIGHT MIDFOOT ASSOCIATED WITH TYPE 2 DIABETES MELLITUS, WITH BONE INVOLVEMENT WITHOUT EVIDENCE OF NECROSIS: ICD-10-CM

## 2022-11-22 DIAGNOSIS — L97.416 DIABETIC ULCER OF RIGHT MIDFOOT ASSOCIATED WITH TYPE 2 DIABETES MELLITUS, WITH BONE INVOLVEMENT WITHOUT EVIDENCE OF NECROSIS: ICD-10-CM

## 2022-11-22 DIAGNOSIS — M86.60 ACUTE ON CHRONIC OSTEOMYELITIS: Primary | ICD-10-CM

## 2022-11-22 DIAGNOSIS — M86.10 ACUTE ON CHRONIC OSTEOMYELITIS: Primary | ICD-10-CM

## 2022-11-22 DIAGNOSIS — L97.529 DIABETIC ULCER OF LEFT FOOT: ICD-10-CM

## 2022-11-22 LAB
ALBUMIN SERPL BCP-MCNC: 3.2 G/DL (ref 3.5–5.2)
ALP SERPL-CCNC: 205 U/L (ref 55–135)
ALT SERPL W/O P-5'-P-CCNC: 20 U/L (ref 10–44)
ANION GAP SERPL CALC-SCNC: 8 MMOL/L (ref 8–16)
AST SERPL-CCNC: 21 U/L (ref 10–40)
BASOPHILS # BLD AUTO: 0.02 K/UL (ref 0–0.2)
BASOPHILS NFR BLD: 0.4 % (ref 0–1.9)
BILIRUB SERPL-MCNC: 0.6 MG/DL (ref 0.1–1)
BUN SERPL-MCNC: 19 MG/DL (ref 6–20)
CALCIUM SERPL-MCNC: 9.3 MG/DL (ref 8.7–10.5)
CHLORIDE SERPL-SCNC: 108 MMOL/L (ref 95–110)
CO2 SERPL-SCNC: 23 MMOL/L (ref 23–29)
CREAT SERPL-MCNC: 1.2 MG/DL (ref 0.5–1.4)
CRP SERPL-MCNC: 1.6 MG/L (ref 0–8.2)
DIFFERENTIAL METHOD: ABNORMAL
EOSINOPHIL # BLD AUTO: 0.1 K/UL (ref 0–0.5)
EOSINOPHIL NFR BLD: 2.3 % (ref 0–8)
ERYTHROCYTE [DISTWIDTH] IN BLOOD BY AUTOMATED COUNT: 13.2 % (ref 11.5–14.5)
EST. GFR  (NO RACE VARIABLE): >60 ML/MIN/1.73 M^2
ESTIMATED AVG GLUCOSE: 235 MG/DL (ref 68–131)
GLUCOSE SERPL-MCNC: 149 MG/DL (ref 70–110)
HBA1C MFR BLD: 9.8 % (ref 4–5.6)
HCT VFR BLD AUTO: 35.6 % (ref 40–54)
HGB BLD-MCNC: 11.2 G/DL (ref 14–18)
IMM GRANULOCYTES # BLD AUTO: 0.01 K/UL (ref 0–0.04)
IMM GRANULOCYTES NFR BLD AUTO: 0.2 % (ref 0–0.5)
LYMPHOCYTES # BLD AUTO: 1.6 K/UL (ref 1–4.8)
LYMPHOCYTES NFR BLD: 32.6 % (ref 18–48)
MCH RBC QN AUTO: 28.9 PG (ref 27–31)
MCHC RBC AUTO-ENTMCNC: 31.5 G/DL (ref 32–36)
MCV RBC AUTO: 92 FL (ref 82–98)
MONOCYTES # BLD AUTO: 0.4 K/UL (ref 0.3–1)
MONOCYTES NFR BLD: 7.2 % (ref 4–15)
NEUTROPHILS # BLD AUTO: 2.8 K/UL (ref 1.8–7.7)
NEUTROPHILS NFR BLD: 57.3 % (ref 38–73)
NRBC BLD-RTO: 0 /100 WBC
PLATELET # BLD AUTO: 266 K/UL (ref 150–450)
PMV BLD AUTO: 10.9 FL (ref 9.2–12.9)
POTASSIUM SERPL-SCNC: 3.7 MMOL/L (ref 3.5–5.1)
PREALB SERPL-MCNC: 21 MG/DL (ref 20–43)
PROT SERPL-MCNC: 7.4 G/DL (ref 6–8.4)
RBC # BLD AUTO: 3.87 M/UL (ref 4.6–6.2)
SODIUM SERPL-SCNC: 139 MMOL/L (ref 136–145)
WBC # BLD AUTO: 4.87 K/UL (ref 3.9–12.7)

## 2022-11-22 PROCEDURE — 63600175 PHARM REV CODE 636 W HCPCS: Performed by: INTERNAL MEDICINE

## 2022-11-22 PROCEDURE — 86140 C-REACTIVE PROTEIN: CPT | Performed by: INTERNAL MEDICINE

## 2022-11-22 PROCEDURE — 83036 HEMOGLOBIN GLYCOSYLATED A1C: CPT | Performed by: INTERNAL MEDICINE

## 2022-11-22 PROCEDURE — 80053 COMPREHEN METABOLIC PANEL: CPT | Performed by: INTERNAL MEDICINE

## 2022-11-22 PROCEDURE — 29581 APPL MULTLAYER CMPRN SYS LEG: CPT | Performed by: FAMILY MEDICINE

## 2022-11-22 PROCEDURE — 85025 COMPLETE CBC W/AUTO DIFF WBC: CPT | Performed by: INTERNAL MEDICINE

## 2022-11-22 PROCEDURE — 84134 ASSAY OF PREALBUMIN: CPT | Performed by: INTERNAL MEDICINE

## 2022-11-22 PROCEDURE — 36592 COLLECT BLOOD FROM PICC: CPT

## 2022-11-22 RX ORDER — SODIUM CHLORIDE 0.9 % (FLUSH) 0.9 %
10 SYRINGE (ML) INJECTION
Status: DISCONTINUED | OUTPATIENT
Start: 2022-11-22 | End: 2022-11-22 | Stop reason: HOSPADM

## 2022-11-22 RX ORDER — HEPARIN 100 UNIT/ML
500 SYRINGE INTRAVENOUS
OUTPATIENT
Start: 2022-11-22

## 2022-11-22 RX ORDER — SODIUM CHLORIDE 0.9 % (FLUSH) 0.9 %
10 SYRINGE (ML) INJECTION
OUTPATIENT
Start: 2022-11-22

## 2022-11-22 RX ORDER — HEPARIN 100 UNIT/ML
500 SYRINGE INTRAVENOUS
Status: DISCONTINUED | OUTPATIENT
Start: 2022-11-22 | End: 2022-11-22 | Stop reason: HOSPADM

## 2022-11-22 RX ADMIN — HEPARIN 500 UNITS: 100 SYRINGE at 01:11

## 2022-11-22 NOTE — PROGRESS NOTES
[facility] [address] [place]  [address]  Nurse Visit    Subjective:       Patient seen in clinic today.  Dressing changed as ordered.      Assessment:          Wound 04/08/22 1137 Diabetic Ulcer Left plantar;medial Foot (Active)   04/08/22 1137    Pre-existing: Yes   Primary Wound Type: Diabetic ulc   Side: Left   Orientation: plantar;medial   Location: Foot   Wound Number:    Ankle-Brachial Index:    Pulses:    Removal Indication and Assessment:    Wound Outcome:    (Retired) Wound Type:    (Retired) Wound Length (cm):    (Retired) Wound Width (cm):    (Retired) Depth (cm):    Wound Description (Comments):    Removal Indications:    Wound WDL ex 11/22/22 1602   Dressing Appearance Intact;Dry 11/22/22 1602   Drainage Amount None 11/22/22 1602   Appearance Intact 11/22/22 1602   Periwound Area Dry;Intact 11/22/22 1602   Wound Edges Callused 11/22/22 1602   Care Cleansed with:;Soap and water;Sterile normal saline 11/22/22 1602   Dressing Changed 11/22/22 1602   Compression Two layer compression 11/22/22 1602   Off Loading Football dressing;Off loading shoe 11/22/22 1602   Dressing Change Due 11/25/22 11/22/22 1602            Wound 05/06/22 1142 Diabetic Ulcer Right plantar Foot (Active)   05/06/22 1142    Pre-existing: Yes   Primary Wound Type: Diabetic ulc   Side: Right   Orientation: plantar   Location: Foot   Wound Number:    Ankle-Brachial Index:    Pulses:    Removal Indication and Assessment:    Wound Outcome:    (Retired) Wound Type:    (Retired) Wound Length (cm):    (Retired) Wound Width (cm):    (Retired) Depth (cm):    Wound Description (Comments):    Removal Indications:    Wound WDL ex 11/22/22 1602   Dressing Appearance Intact;Moist drainage 11/22/22 1602   Drainage Amount Moderate 11/22/22 1602   Drainage Characteristics/Odor Serosanguineous 11/22/22 1602   Appearance Red 11/22/22 1602   Tissue loss description Partial thickness 11/22/22 1602   Periwound Area  Macerated 11/22/22 1602   Wound Edges Defined 11/22/22 1602   Care Cleansed with:;Soap and water;Sterile normal saline 11/22/22 1602   Dressing Changed 11/22/22 1602   Periwound Care Moisture barrier applied 11/22/22 1602   Compression Two layer compression 11/22/22 1602   Off Loading Football dressing;Off loading shoe 11/22/22 1602   Dressing Change Due 11/25/22 11/22/22 1602           Plan:     No orders of the defined types were placed in this encounter.          Follow up in about 3 days (around 11/25/2022) for MD wound care visit.

## 2022-11-22 NOTE — PROGRESS NOTES
Pt arrived to infusion suite for blood draw from SUELLEN PICC line.  CRP, CMP, CBC, hemoglobin A1C , and pre albumin collected, red port somewhat difficult to flush, but do flush and have positive blood return. Labs collected via the red port, flushed well with good blood return, 10 cc blood discarded, specimen collected sent to lab,  flushed with NS and heparin, tolerated well.  Sterile dressing change to site done, removed transparent dressing with biopatch in place, no redness or swelling noted, replaced dressing and biopath., tolerated well.  Left unit in NAD.Return appt provided.

## 2022-11-25 ENCOUNTER — HOSPITAL ENCOUNTER (OUTPATIENT)
Dept: CARDIOLOGY | Facility: HOSPITAL | Age: 52
Discharge: HOME | End: 2022-11-25
Attending: INTERNAL MEDICINE
Payer: MEDICARE

## 2022-11-25 ENCOUNTER — HOSPITAL ENCOUNTER (OUTPATIENT)
Dept: WOUND CARE | Facility: HOSPITAL | Age: 52
Discharge: HOME | End: 2022-11-25
Attending: PODIATRIST
Payer: MEDICARE

## 2022-11-25 VITALS — DIASTOLIC BLOOD PRESSURE: 92 MMHG | HEART RATE: 95 BPM | TEMPERATURE: 98 F | SYSTOLIC BLOOD PRESSURE: 155 MMHG

## 2022-11-25 DIAGNOSIS — E11.621 DIABETIC ULCER OF LEFT FOOT: ICD-10-CM

## 2022-11-25 DIAGNOSIS — L97.416 DIABETIC ULCER OF RIGHT MIDFOOT ASSOCIATED WITH TYPE 2 DIABETES MELLITUS, WITH BONE INVOLVEMENT WITHOUT EVIDENCE OF NECROSIS: ICD-10-CM

## 2022-11-25 DIAGNOSIS — E11.621 DIABETIC ULCER OF RIGHT MIDFOOT ASSOCIATED WITH TYPE 2 DIABETES MELLITUS, WITH BONE INVOLVEMENT WITHOUT EVIDENCE OF NECROSIS: Primary | ICD-10-CM

## 2022-11-25 DIAGNOSIS — L97.422 DIABETIC ULCER OF LEFT HEEL ASSOCIATED WITH TYPE 2 DIABETES MELLITUS, WITH FAT LAYER EXPOSED: ICD-10-CM

## 2022-11-25 DIAGNOSIS — E11.621 DIABETIC ULCER OF RIGHT MIDFOOT ASSOCIATED WITH TYPE 2 DIABETES MELLITUS, WITH BONE INVOLVEMENT WITHOUT EVIDENCE OF NECROSIS: ICD-10-CM

## 2022-11-25 DIAGNOSIS — E11.621 DIABETIC ULCER OF LEFT HEEL ASSOCIATED WITH TYPE 2 DIABETES MELLITUS, WITH FAT LAYER EXPOSED: ICD-10-CM

## 2022-11-25 DIAGNOSIS — L97.529 DIABETIC ULCER OF LEFT FOOT: ICD-10-CM

## 2022-11-25 DIAGNOSIS — L97.416 DIABETIC ULCER OF RIGHT MIDFOOT ASSOCIATED WITH TYPE 2 DIABETES MELLITUS, WITH BONE INVOLVEMENT WITHOUT EVIDENCE OF NECROSIS: Primary | ICD-10-CM

## 2022-11-25 LAB
LEFT ABI: 1.13
LEFT ARM BP: 175 MMHG
LEFT DORSALIS PEDIS: 198 MMHG
LEFT TBI: 0.48
LEFT TOE PRESSURE: 84 MMHG
RIGHT ABI: 1.01
RIGHT DORSALIS PEDIS: 173 MMHG
RIGHT POSTERIOR TIBIAL: 176 MMHG
RIGHT TBI: 0.34
RIGHT TOE PRESSURE: 59 MMHG

## 2022-11-25 PROCEDURE — 99499 UNLISTED E&M SERVICE: CPT | Mod: ,,, | Performed by: PODIATRIST

## 2022-11-25 PROCEDURE — 11042 DBRDMT SUBQ TIS 1ST 20SQCM/<: CPT | Performed by: PODIATRIST

## 2022-11-25 PROCEDURE — 93922 UPR/L XTREMITY ART 2 LEVELS: CPT | Mod: 26,,, | Performed by: INTERNAL MEDICINE

## 2022-11-25 PROCEDURE — 11042 WOUND DEBRIDEMENT: ICD-10-PCS | Mod: ,,, | Performed by: PODIATRIST

## 2022-11-25 PROCEDURE — 93922 UPR/L XTREMITY ART 2 LEVELS: CPT

## 2022-11-25 PROCEDURE — 11042 DBRDMT SUBQ TIS 1ST 20SQCM/<: CPT | Mod: ,,, | Performed by: PODIATRIST

## 2022-11-25 PROCEDURE — 93922 ANKLE BRACHIAL INDICES (ABI): ICD-10-PCS | Mod: 26,,, | Performed by: INTERNAL MEDICINE

## 2022-11-25 PROCEDURE — 99499 NO LOS: ICD-10-PCS | Mod: ,,, | Performed by: PODIATRIST

## 2022-11-25 NOTE — PROGRESS NOTES
[facility]                Progress Note    Subjective:       Patient ID: Indio Ryder Jr. is a 52 y.o. male.    Chief Complaint: Wound Check and Dressing Change    Follow up wound care visit. Patient ambulated to exam room without assistance, prescribed CAM boot on LLE and Darco shoe on Right Foot. Patient denies pain or discomfort at present. Dressing intact with a scant amount of serosanguineous drainage from Left Foot wound and a moderate amount of serosanguineous drainage from Right Foot wound.       Review of Systems   Constitutional:  Negative for appetite change, chills, fatigue and fever.   HENT:  Negative for hearing loss.    Eyes:  Negative for photophobia and visual disturbance.   Respiratory:  Negative for cough, chest tightness, shortness of breath and wheezing.    Cardiovascular:  Positive for leg swelling. Negative for chest pain and palpitations.   Gastrointestinal:  Negative for constipation, diarrhea, nausea and vomiting.   Endocrine: Negative for cold intolerance and heat intolerance.   Genitourinary:  Negative for flank pain.   Musculoskeletal:  Positive for gait problem and myalgias. Negative for neck pain and neck stiffness.   Skin:  Positive for wound. Negative for color change.   Neurological:  Positive for numbness. Negative for weakness, light-headedness and headaches.        + paresthesia    Psychiatric/Behavioral:  Negative for sleep disturbance.        Objective:        Physical Exam    Vitals:    11/25/22 1057   BP: (!) 155/92   Pulse: 95   Temp: 97.5 °F (36.4 °C)       Assessment:           ICD-10-CM ICD-9-CM   1. Diabetic ulcer of right midfoot associated with type 2 diabetes mellitus, with bone involvement without evidence of necrosis  E11.621 250.80    L97.416 707.14   2. Diabetic ulcer of left foot  E11.621 250.80    L97.529 707.15   3. Diabetic ulcer of left heel associated with type 2 diabetes mellitus, with fat layer exposed   E11.621 250.80    L97.422 707.14            Wound 04/08/22 1137 Diabetic Ulcer Left plantar;medial Foot (Active)   04/08/22 1137    Pre-existing: Yes   Primary Wound Type: Diabetic ulc   Side: Left   Orientation: plantar;medial   Location: Foot   Wound Number:    Ankle-Brachial Index:    Pulses:    Removal Indication and Assessment:    Wound Outcome:    (Retired) Wound Type:    (Retired) Wound Length (cm):    (Retired) Wound Width (cm):    (Retired) Depth (cm):    Wound Description (Comments):    Removal Indications:    Wound Image    11/25/22 1106   Wound WDL ex 11/25/22 1106   Dressing Appearance Intact;Dried drainage 11/25/22 1106   Drainage Amount Scant 11/25/22 1106   Drainage Characteristics/Odor Serosanguineous;No odor 11/25/22 1106   Appearance Red 11/25/22 1106   Tissue loss description Partial thickness 11/25/22 1106   Black (%), Wound Tissue Color 0 % 11/25/22 1106   Red (%), Wound Tissue Color 100 % 11/25/22 1106   Yellow (%), Wound Tissue Color 0 % 11/25/22 1106   Periwound Area Dry 11/25/22 1106   Wound Edges Defined;Open;Callused 11/25/22 1106   Wound Length (cm) 0.2 cm 11/25/22 1106   Wound Width (cm) 0.2 cm 11/25/22 1106   Wound Depth (cm) 0.1 cm 11/25/22 1106   Wound Volume (cm^3) 0.004 cm^3 11/25/22 1106   Wound Surface Area (cm^2) 0.04 cm^2 11/25/22 1106   Tunneling (depth (cm)/location) 0 11/25/22 1106   Undermining (depth (cm)/location) 0 11/25/22 1106   Care Cleansed with:;Antimicrobial agent;Sterile normal saline 11/25/22 1106   Dressing Changed 11/25/22 1106   Periwound Care Moisture barrier applied;Skin barrier film applied 11/25/22 1106   Compression Two layer compression 11/25/22 1106   Off Loading Football dressing;Off loading shoe 11/25/22 1106   Dressing Change Due 11/30/22 11/25/22 1106            Wound 05/06/22 1142 Diabetic Ulcer Right plantar Foot (Active)   05/06/22 1142    Pre-existing: Yes   Primary Wound Type: Diabetic ulc   Side: Right   Orientation: plantar   Location: Foot    Wound Number:    Ankle-Brachial Index:    Pulses:    Removal Indication and Assessment:    Wound Outcome:    (Retired) Wound Type:    (Retired) Wound Length (cm):    (Retired) Wound Width (cm):    (Retired) Depth (cm):    Wound Description (Comments):    Removal Indications:    Wound Image    11/25/22 1106   Wound WDL ex 11/25/22 1106   Dressing Appearance Intact;Moist drainage 11/25/22 1106   Drainage Amount Moderate 11/25/22 1106   Drainage Characteristics/Odor Serosanguineous;No odor 11/25/22 1106   Appearance Red 11/25/22 1106   Tissue loss description Partial thickness 11/25/22 1106   Black (%), Wound Tissue Color 0 % 11/25/22 1106   Red (%), Wound Tissue Color 100 % 11/25/22 1106   Yellow (%), Wound Tissue Color 0 % 11/25/22 1106   Periwound Area Pale white 11/25/22 1106   Wound Edges Defined;Open 11/25/22 1106   Wound Length (cm) 0.6 cm 11/25/22 1106   Wound Width (cm) 0.1 cm 11/25/22 1106   Wound Depth (cm) 1.7 cm 11/25/22 1106   Wound Volume (cm^3) 0.102 cm^3 11/25/22 1106   Wound Surface Area (cm^2) 0.06 cm^2 11/25/22 1106   Tunneling (depth (cm)/location) 0.4 cm @ 6 o'clock 11/25/22 1106   Care Cleansed with:;Antimicrobial agent;Sterile normal saline;Debrided 11/25/22 1106   Dressing Changed 11/25/22 1106   Periwound Care Moisture barrier applied;Skin barrier film applied 11/25/22 1106   Compression Two layer compression 11/25/22 1106   Off Loading Football dressing;Off loading shoe 11/25/22 1106   Dressing Change Due 11/30/22 11/25/22 1106           Plan:            Wound Debridement    Date/Time: 11/25/2022 10:30 AM  Performed by: Marivel Peterson DPM  Authorized by: Marivel Peterson DPM       Type of Debridement:  Excisional       Length (cm):  0.6       Area (sq cm):  0.12       Width (cm):  0.2       Percent Debrided (%):  100       Depth (cm):  1.7       Total Area Debrided (sq cm):  0.12    Depth of debridement:  Subcutaneous tissue    Tissue debrided:  Dermis, Epidermis and Subcutaneous     Devitalized tissue debrided:  Callus and Fibrin    Instruments:  Curette    Bleeding:  Minimal  Hemostasis Achieved: Yes    Method Used:  Pressure  Patient tolerance:  Patient tolerated the procedure well with no immediate complications    Wound continues to probe deep to bone.  Discussed pan met head resection tor offloading, healing, and prevention of recurrence.  He declines at this time.    Wound care done as per order, return to clinic on Wednesday for nurse visit and in 1 week to see MD.        Orders Placed This Encounter   Procedures    Debridement     This order was created via procedure documentation     Standing Status:   Standing     Number of Occurrences:   1    Change dressing     Clean with NS.   Bilateral Feet: Gentian violet to periwound. Cavilon/Benzoin to plantar foot or where U-shaped pad to be placed. RLE: Custom felt pad with small hole over wound to allow for drainage then U-pad to custom felt pad (per MD). Iodosorb (use syringe to apply to tunneling) then [place] to wound bed. Drawtex (cut to fit and stacked), Mextra short x1, abram foam long x2. Football dressing (cast padding x3). Calamine Coflex - calamine touching skin before football.    LLE: U-pad around wound bed. [place] then Iodosorb to wound bed. Drawtex (cut to fit). Mextra short x 1. Abram foam long x2. Football dressing (cast padding x3). Calamine Coflex - calamine touching skin before football.    Change Right Foot Dressing only at Nurse Visit per MD.        Follow up in about 5 days (around 11/30/2022) for wound care.

## 2022-11-29 ENCOUNTER — INFUSION (OUTPATIENT)
Dept: INFECTIOUS DISEASES | Facility: HOSPITAL | Age: 52
End: 2022-11-29
Attending: INTERNAL MEDICINE
Payer: MEDICARE

## 2022-11-29 DIAGNOSIS — M86.9 OSTEOMYELITIS, UNSPECIFIED SITE, UNSPECIFIED TYPE: ICD-10-CM

## 2022-11-29 DIAGNOSIS — L97.416 DIABETIC ULCER OF RIGHT MIDFOOT ASSOCIATED WITH TYPE 2 DIABETES MELLITUS, WITH BONE INVOLVEMENT WITHOUT EVIDENCE OF NECROSIS: Primary | ICD-10-CM

## 2022-11-29 DIAGNOSIS — E11.621 DIABETIC ULCER OF RIGHT MIDFOOT ASSOCIATED WITH TYPE 2 DIABETES MELLITUS, WITH BONE INVOLVEMENT WITHOUT EVIDENCE OF NECROSIS: Primary | ICD-10-CM

## 2022-11-29 LAB
ALBUMIN SERPL BCP-MCNC: 3 G/DL (ref 3.5–5.2)
ALP SERPL-CCNC: 229 U/L (ref 55–135)
ALT SERPL W/O P-5'-P-CCNC: 25 U/L (ref 10–44)
ANION GAP SERPL CALC-SCNC: 7 MMOL/L (ref 8–16)
AST SERPL-CCNC: 25 U/L (ref 10–40)
BASOPHILS # BLD AUTO: 0.03 K/UL (ref 0–0.2)
BASOPHILS NFR BLD: 0.8 % (ref 0–1.9)
BILIRUB SERPL-MCNC: 0.6 MG/DL (ref 0.1–1)
BUN SERPL-MCNC: 9 MG/DL (ref 6–20)
CALCIUM SERPL-MCNC: 9.4 MG/DL (ref 8.7–10.5)
CHLORIDE SERPL-SCNC: 105 MMOL/L (ref 95–110)
CO2 SERPL-SCNC: 31 MMOL/L (ref 23–29)
CREAT SERPL-MCNC: 0.8 MG/DL (ref 0.5–1.4)
CRP SERPL-MCNC: 2.8 MG/L (ref 0–8.2)
DIFFERENTIAL METHOD: ABNORMAL
EOSINOPHIL # BLD AUTO: 0.2 K/UL (ref 0–0.5)
EOSINOPHIL NFR BLD: 5.9 % (ref 0–8)
ERYTHROCYTE [DISTWIDTH] IN BLOOD BY AUTOMATED COUNT: 13.5 % (ref 11.5–14.5)
EST. GFR  (NO RACE VARIABLE): >60 ML/MIN/1.73 M^2
GLUCOSE SERPL-MCNC: 78 MG/DL (ref 70–110)
HCT VFR BLD AUTO: 37.8 % (ref 40–54)
HGB BLD-MCNC: 11.9 G/DL (ref 14–18)
IMM GRANULOCYTES # BLD AUTO: 0.01 K/UL (ref 0–0.04)
IMM GRANULOCYTES NFR BLD AUTO: 0.3 % (ref 0–0.5)
LYMPHOCYTES # BLD AUTO: 1.6 K/UL (ref 1–4.8)
LYMPHOCYTES NFR BLD: 43.7 % (ref 18–48)
MCH RBC QN AUTO: 29 PG (ref 27–31)
MCHC RBC AUTO-ENTMCNC: 31.5 G/DL (ref 32–36)
MCV RBC AUTO: 92 FL (ref 82–98)
MONOCYTES # BLD AUTO: 0.3 K/UL (ref 0.3–1)
MONOCYTES NFR BLD: 8.8 % (ref 4–15)
NEUTROPHILS # BLD AUTO: 1.5 K/UL (ref 1.8–7.7)
NEUTROPHILS NFR BLD: 40.5 % (ref 38–73)
NRBC BLD-RTO: 0 /100 WBC
PLATELET # BLD AUTO: 265 K/UL (ref 150–450)
PMV BLD AUTO: 11.2 FL (ref 9.2–12.9)
POTASSIUM SERPL-SCNC: 3.5 MMOL/L (ref 3.5–5.1)
PROT SERPL-MCNC: 7.3 G/DL (ref 6–8.4)
RBC # BLD AUTO: 4.11 M/UL (ref 4.6–6.2)
SODIUM SERPL-SCNC: 143 MMOL/L (ref 136–145)
WBC # BLD AUTO: 3.75 K/UL (ref 3.9–12.7)

## 2022-11-29 PROCEDURE — 63600175 PHARM REV CODE 636 W HCPCS: Performed by: INTERNAL MEDICINE

## 2022-11-29 PROCEDURE — 36592 COLLECT BLOOD FROM PICC: CPT

## 2022-11-29 PROCEDURE — 80053 COMPREHEN METABOLIC PANEL: CPT | Performed by: INTERNAL MEDICINE

## 2022-11-29 PROCEDURE — 85025 COMPLETE CBC W/AUTO DIFF WBC: CPT | Performed by: INTERNAL MEDICINE

## 2022-11-29 PROCEDURE — 86140 C-REACTIVE PROTEIN: CPT | Performed by: INTERNAL MEDICINE

## 2022-11-29 RX ORDER — SODIUM CHLORIDE 0.9 % (FLUSH) 0.9 %
10 SYRINGE (ML) INJECTION
Status: CANCELLED | OUTPATIENT
Start: 2022-11-29

## 2022-11-29 RX ORDER — SODIUM CHLORIDE 0.9 % (FLUSH) 0.9 %
10 SYRINGE (ML) INJECTION
Status: DISCONTINUED | OUTPATIENT
Start: 2022-11-29 | End: 2022-11-29 | Stop reason: HOSPADM

## 2022-11-29 RX ORDER — HEPARIN 100 UNIT/ML
500 SYRINGE INTRAVENOUS
Status: DISCONTINUED | OUTPATIENT
Start: 2022-11-29 | End: 2022-11-29 | Stop reason: HOSPADM

## 2022-11-29 RX ORDER — HEPARIN 100 UNIT/ML
500 SYRINGE INTRAVENOUS
Status: CANCELLED | OUTPATIENT
Start: 2022-11-29

## 2022-11-29 RX ADMIN — HEPARIN 500 UNITS: 100 SYRINGE at 01:11

## 2022-11-29 NOTE — PROGRESS NOTES
PICC dressing change provided to left UA utilizing PICC protocol.  Blood return through all ports, chlorhexidine patch and sterile dressing applied.  Line review performed.  Site free from s/s of infection - no redness, no drainage, no edema.    Labs drawn: BMP, CBC , and CRP    Pt tolerated well.  Time allotted for questions.

## 2022-11-30 ENCOUNTER — HOSPITAL ENCOUNTER (OUTPATIENT)
Dept: WOUND CARE | Facility: HOSPITAL | Age: 52
Discharge: HOME | End: 2022-11-30
Attending: FAMILY MEDICINE
Payer: MEDICARE

## 2022-11-30 VITALS — DIASTOLIC BLOOD PRESSURE: 88 MMHG | SYSTOLIC BLOOD PRESSURE: 158 MMHG | TEMPERATURE: 98 F | HEART RATE: 98 BPM

## 2022-11-30 DIAGNOSIS — M86.671 CHRONIC OSTEOMYELITIS OF RIGHT FOOT: ICD-10-CM

## 2022-11-30 DIAGNOSIS — E11.621 DIABETIC ULCER OF RIGHT MIDFOOT ASSOCIATED WITH TYPE 2 DIABETES MELLITUS, WITH BONE INVOLVEMENT WITHOUT EVIDENCE OF NECROSIS: Primary | ICD-10-CM

## 2022-11-30 DIAGNOSIS — L97.416 DIABETIC ULCER OF RIGHT MIDFOOT ASSOCIATED WITH TYPE 2 DIABETES MELLITUS, WITH BONE INVOLVEMENT WITHOUT EVIDENCE OF NECROSIS: Primary | ICD-10-CM

## 2022-11-30 PROCEDURE — 11042 DEBRIDEMENT: ICD-10-PCS | Mod: ,,, | Performed by: FAMILY MEDICINE

## 2022-11-30 PROCEDURE — 11042 DBRDMT SUBQ TIS 1ST 20SQCM/<: CPT | Performed by: FAMILY MEDICINE

## 2022-11-30 PROCEDURE — 99215 PR OFFICE/OUTPT VISIT, EST, LEVL V, 40-54 MIN: ICD-10-PCS | Mod: 25,,, | Performed by: FAMILY MEDICINE

## 2022-11-30 PROCEDURE — 11042 DBRDMT SUBQ TIS 1ST 20SQCM/<: CPT | Mod: ,,, | Performed by: FAMILY MEDICINE

## 2022-11-30 PROCEDURE — 99215 OFFICE O/P EST HI 40 MIN: CPT | Mod: 25,,, | Performed by: FAMILY MEDICINE

## 2022-11-30 NOTE — PROGRESS NOTES
[facility]                Progress Note    Subjective:       Patient ID: Indio Ryder Jr. is a 52 y.o. male.    Chief Complaint: Dressing Change    Follow up wound care visit. Patient ambulated to exam room without assistance, prescribed CAM boot on LLE and Darco shoe on Right Foot. Patient denies pain or discomfort at present. Dressing's intact with a large amount of serosanguineous, non-malodor drainage from Right Foot wound, strikthrough noted at distal 3rd toe. No drainage from Left Foot wound. Right Plantar Foot wound measuring smaller in (0.1 cm) length, larger in (0.2 cm) width and smaller in (0.2 cm) depth - MD debrided. Left Plantar Foot wound has thin layer of epithelization.    Wound care done as per order. Return to clinic in 2 days for RLE dressing change at nurse visit and in 1 week on 12/07. Then return to see Podiatrist on Friday 12/09.      This is an established patient in wound care.   Patient presents in the office today for evaluation of the chronic wound.  Updated HPI is noted below.    The periwound appears non-erythematous, macerated, non-edematous    The wound appears stable; probes to bone; slough in wound bed. Serous drainage.     Patient denies fever, chills    Patients reports wound pain    Lymphedema status is contributory to wound status    Pain at the site of the wound is aching and throbbing    Contributing factors to current wound state include numerous comorbid conditions, Diabetes Type 2, off-loading limitations, Lymphedema    HBO consideration   pt has Diabetic wound of the lower extremity (Meets all 3):  DFUcriteria: Patient has type I or type II diabetes and has a lower extremity wound that is due to diabetes, Patient has a wound classified as Meza grade III or higher, and Patient has failed an adequate course of standard wound therapy  meza grade  3 on (date of meza specification)    Culture positive for: KLEBSIELLA  PNEUMONIAE ESBL on 10/12/2022  Patient had PICC line placed, started on meropenem 2g IV q8 hours    ALISSA - left 1.13 and right 1.01 on 11/18/2022     Nutrition status assessed - pre-albumin - Normal - pt has been advised of importance of protein / good glycemic control     Diabetes assess - a1c 9.8 (decreased from 13.3 with medication changes, diabetes is Suboptimal control. Patient has been advised to improve: medication, dietary, and lifestyle     Off-loading - pt has been using Darco Boot  to off-load     Debridement by any means to remove devitalized tissue, maintenance of a clean, moist bed of granulation tissue with appropriate moist dressings were completed every 1 week     Patient would benefit from HBO therapy. Patient has not had resolution of wound despite greater than 30 days of standard wound care with optimization of vascular status, nutrition, glycemic control, off-loading, infection management and debridement.     Order:   Order 30 x HBO treatment at 2.0 hector x 120 minutes       Review of Systems   Constitutional:  Negative for activity change, appetite change and fever.   HENT:  Negative for congestion.    Respiratory:  Negative for shortness of breath and wheezing.    Cardiovascular:  Negative for chest pain and leg swelling.   Gastrointestinal:  Negative for abdominal pain, nausea and vomiting.   Skin:  Positive for wound.   Neurological:  Negative for dizziness and headaches.   Psychiatric/Behavioral:  The patient is not nervous/anxious.        Objective:        Physical Exam  Vitals and nursing note reviewed.   Constitutional:       Appearance: He is well-developed.   HENT:      Head: Normocephalic and atraumatic.   Eyes:      Conjunctiva/sclera: Conjunctivae normal.   Pulmonary:      Effort: Pulmonary effort is normal.   Abdominal:      General: There is no distension.      Palpations: Abdomen is soft.   Musculoskeletal:         General: Normal range of motion.      Cervical back: Normal range of  motion and neck supple.   Skin:     Comments: See wound description   Neurological:      Mental Status: He is alert and oriented to person, place, and time.   Psychiatric:         Behavior: Behavior normal.       Vitals:    11/30/22 0859   BP: (!) 158/88   Pulse: 98   Temp: 98 °F (36.7 °C)       Assessment:           ICD-10-CM ICD-9-CM   1. Diabetic ulcer of right midfoot associated with type 2 diabetes mellitus, with bone involvement without evidence of necrosis  E11.621 250.80    L97.416 707.14   2. Chronic osteomyelitis of right foot  M86.671 730.17            Wound 04/08/22 1137 Diabetic Ulcer Left plantar;medial Foot (Active)   04/08/22 1137    Pre-existing: Yes   Primary Wound Type: Diabetic ulc   Side: Left   Orientation: plantar;medial   Location: Foot   Wound Number:    Ankle-Brachial Index:    Pulses:    Removal Indication and Assessment:    Wound Outcome:    (Retired) Wound Type:    (Retired) Wound Length (cm):    (Retired) Wound Width (cm):    (Retired) Depth (cm):    Wound Description (Comments):    Removal Indications:    Wound Image   11/30/22 0910   Wound WDL ex 11/30/22 0910   Dressing Appearance Intact;Dry 11/30/22 0910   Drainage Amount None 11/30/22 0910   Appearance Epithelialization 11/30/22 0910   Tissue loss description Not applicable 11/30/22 0910   Periwound Area Dry 11/30/22 0910   Wound Edges Approximated;Callused 11/30/22 0910   Wound Length (cm) 0 cm 11/30/22 0910   Wound Width (cm) 0 cm 11/30/22 0910   Wound Depth (cm) 0 cm 11/30/22 0910   Wound Volume (cm^3) 0 cm^3 11/30/22 0910   Wound Surface Area (cm^2) 0 cm^2 11/30/22 0910   Care Cleansed with:;Antimicrobial agent;Sterile normal saline 11/30/22 0910   Dressing Changed 11/30/22 0910   Periwound Care Moisture barrier applied 11/30/22 0910   Compression Two layer compression 11/30/22 0910   Off Loading Football dressing;Off loading shoe 11/30/22 0910   Dressing Change Due 12/09/22 11/30/22 0910            Wound 05/06/22 1142 Diabetic  Ulcer Right plantar Foot (Active)   05/06/22 1142    Pre-existing: Yes   Primary Wound Type: Diabetic ulc   Side: Right   Orientation: plantar   Location: Foot   Wound Number:    Ankle-Brachial Index:    Pulses:    Removal Indication and Assessment:    Wound Outcome:    (Retired) Wound Type:    (Retired) Wound Length (cm):    (Retired) Wound Width (cm):    (Retired) Depth (cm):    Wound Description (Comments):    Removal Indications:    Wound Image    11/30/22 0910   Wound WDL ex 11/30/22 0910   Dressing Appearance Intact;Moist drainage;Area marked 11/30/22 0910   Drainage Amount Large 11/30/22 0910   Drainage Characteristics/Odor Serosanguineous;No odor 11/30/22 0910   Appearance Red;Bleeding 11/30/22 0910   Tissue loss description Full thickness 11/30/22 0910   Black (%), Wound Tissue Color 0 % 11/30/22 0910   Red (%), Wound Tissue Color 100 % 11/30/22 0910   Periwound Area Macerated;Pale white;Moist 11/30/22 0910   Wound Edges Defined;Open 11/30/22 0910   Wound Length (cm) 0.5 cm 11/30/22 0910   Wound Width (cm) 0.3 cm 11/30/22 0910   Wound Depth (cm) 1.5 cm 11/30/22 0910   Wound Volume (cm^3) 0.225 cm^3 11/30/22 0910   Wound Surface Area (cm^2) 0.15 cm^2 11/30/22 0910   Tunneling (depth (cm)/location) 0 11/30/22 0910   Undermining (depth (cm)/location) 0 11/30/22 0910   Care Cleansed with:;Antimicrobial agent;Sterile normal saline 11/30/22 0910   Dressing Changed 11/30/22 0910   Periwound Care Moisture barrier applied 11/30/22 0910   Compression Two layer compression 11/30/22 0910   Off Loading Football dressing;Off loading shoe 11/30/22 0910   Dressing Change Due 12/02/22 11/30/22 0910           Debridement    Date/Time: 11/30/2022 8:45 AM  Performed by: Ruth Sandoval MD  Authorized by: Ruth Sandoval MD     Consent Done?:  Yes (Verbal)  Local anesthesia used?: Yes    Local anesthetic:  Topical anesthetic    Wound Details:    Location:  Right foot    Location:  Right Plantar    Type of Debridement:  Excisional        Length (cm):  0.5       Area (sq cm):  0.15       Width (cm):  0.3       Percent Debrided (%):  100       Depth (cm):  1.5       Total Area Debrided (sq cm):  0.15    Depth of debridement:  Subcutaneous tissue    Tissue debrided:  Subcutaneous    Devitalized tissue debrided:  Slough    Instruments:  Curette    Bleeding:  Minimal  Hemostasis Achieved: Yes    Method Used:  Pressure  Patient tolerance:  Patient tolerated the procedure well with no immediate complications      Plan:          Debridement needed and Done today.   Keep dressing clean and intact  Recommend off-loading  Continue to monitor glucose   Submit for HBO - Order: Order 30 x HBO treatment at 2.0 hector x 120 minutes   Continue with wound care orders and plan as noted in orders.   Continue to follow current medication regimen as per pcp   Call for any questions / concerns.         Orders Placed This Encounter   Procedures    Debridement     This order was created via procedure documentation     Standing Status:   Standing     Number of Occurrences:   1    Change dressing     Clean with NS.   Bilateral Feet: Gentian violet to periwound. Cavilon/Benzoin to plantar foot or where U-shaped pad to be placed. RLE: Custom felt pad with small hole over wound to allow for drainage then U-pad to custom felt pad (per MD). Iodosorb (use syringe to apply to tunneling) then [place] to wound bed. Drawtex (cut to fit and stacked), Mextra short x1, abram foam long x2. Football dressing (cast padding x3). Calamine Coflex - calamine touching skin before football.     LLE: U-pad around wound bed. [place] then Iodosorb to wound bed. Drawtex (cut to fit). Mextra short x 1. Abram foam long x2. Football dressing (cast padding x3). Calamine Coflex - calamine touching skin before football.     Change Right Foot Dressing only at Nurse Visit per MD.        Follow up in about 2 days (around 12/2/2022) for wound care.

## 2022-12-02 ENCOUNTER — HOSPITAL ENCOUNTER (OUTPATIENT)
Dept: WOUND CARE | Facility: HOSPITAL | Age: 52
Discharge: HOME | End: 2022-12-02
Attending: FAMILY MEDICINE
Payer: MEDICARE

## 2022-12-02 VITALS
SYSTOLIC BLOOD PRESSURE: 170 MMHG | HEART RATE: 85 BPM | TEMPERATURE: 98 F | DIASTOLIC BLOOD PRESSURE: 84 MMHG | RESPIRATION RATE: 20 BRPM

## 2022-12-02 DIAGNOSIS — E11.621 DIABETIC ULCER OF RIGHT MIDFOOT ASSOCIATED WITH TYPE 2 DIABETES MELLITUS, WITH BONE INVOLVEMENT WITHOUT EVIDENCE OF NECROSIS: Primary | ICD-10-CM

## 2022-12-02 DIAGNOSIS — L97.416 DIABETIC ULCER OF RIGHT MIDFOOT ASSOCIATED WITH TYPE 2 DIABETES MELLITUS, WITH BONE INVOLVEMENT WITHOUT EVIDENCE OF NECROSIS: Primary | ICD-10-CM

## 2022-12-02 PROCEDURE — 29581 APPL MULTLAYER CMPRN SYS LEG: CPT

## 2022-12-02 NOTE — PROGRESS NOTES
[facility]  [address]  [id number]  Nurse Visit    Subjective:       Patient seen in clinic today.  Dressing changed as ordered (custom foam pad not available).      Assessment:          Wound 05/06/22 1142 Diabetic Ulcer Right plantar Foot (Active)   05/06/22 1142    Pre-existing: Yes   Primary Wound Type: Diabetic ulc   Side: Right   Orientation: plantar   Location: Foot   Wound Number:    Ankle-Brachial Index:    Pulses:    Removal Indication and Assessment:    Wound Outcome:    (Retired) Wound Type:    (Retired) Wound Length (cm):    (Retired) Wound Width (cm):    (Retired) Depth (cm):    Wound Description (Comments):    Removal Indications:    Wound WDL ex 12/02/22 1219   Drainage Amount Moderate 12/02/22 1219   Drainage Characteristics/Odor Serosanguineous 12/02/22 1219   Appearance Red 12/02/22 1219   Tissue loss description Full thickness 12/02/22 1219   Red (%), Wound Tissue Color 100 % 12/02/22 1219   Periwound Area Macerated;Pale white 12/02/22 1219   Wound Edges Defined 12/02/22 1219   Care Cleansed with:;Soap and water;Sterile normal saline 12/02/22 1219   Dressing Changed 12/02/22 1219   Periwound Care Moisture barrier applied 12/02/22 1219   Compression Two layer compression 12/02/22 1219   Off Loading Off loading shoe 12/02/22 1219   Dressing Change Due 12/07/22 12/02/22 1219           Plan:     No orders of the defined types were placed in this encounter.          Follow up in about 5 days (around 12/7/2022) for nurse visit.

## 2022-12-06 ENCOUNTER — INFUSION (OUTPATIENT)
Dept: INFECTIOUS DISEASES | Facility: HOSPITAL | Age: 52
End: 2022-12-06
Attending: INTERNAL MEDICINE
Payer: MEDICARE

## 2022-12-06 ENCOUNTER — TELEPHONE (OUTPATIENT)
Dept: INFECTIOUS DISEASES | Facility: CLINIC | Age: 52
End: 2022-12-06
Payer: MEDICARE

## 2022-12-06 DIAGNOSIS — M86.9 OSTEOMYELITIS, UNSPECIFIED SITE, UNSPECIFIED TYPE: ICD-10-CM

## 2022-12-06 DIAGNOSIS — M86.9 OSTEOMYELITIS, UNSPECIFIED SITE, UNSPECIFIED TYPE: Primary | ICD-10-CM

## 2022-12-06 NOTE — PROGRESS NOTES
PICC LINE REMOVED FROM UPPER RIGHT ARM.   PICC LINE 43CM IN LENGTH.  PT TOLERATED WELL.  PRESSURE BANDAGE APPLIED TO SITE.  SITE RECHECKED 30 MINUTES LATER, NO BLEEDING NOTED.  EDUCATED PT ON SITE CARE OVER THE NEXT COUPLE OF DAYS. PT VERBALIZED UNDERSTANDING AND LEFT IN NAD.

## 2022-12-07 ENCOUNTER — HOSPITAL ENCOUNTER (OUTPATIENT)
Dept: WOUND CARE | Facility: HOSPITAL | Age: 52
Discharge: HOME | End: 2022-12-07
Attending: FAMILY MEDICINE
Payer: MEDICARE

## 2022-12-07 VITALS
TEMPERATURE: 98 F | DIASTOLIC BLOOD PRESSURE: 95 MMHG | RESPIRATION RATE: 16 BRPM | SYSTOLIC BLOOD PRESSURE: 179 MMHG | HEART RATE: 95 BPM

## 2022-12-07 PROCEDURE — 29581 APPL MULTLAYER CMPRN SYS LEG: CPT | Performed by: FAMILY MEDICINE

## 2022-12-07 PROCEDURE — 99214 OFFICE O/P EST MOD 30 MIN: CPT | Performed by: FAMILY MEDICINE

## 2022-12-07 NOTE — PROGRESS NOTES
[facility]  [address]   [place]  Nurse Visit    Subjective:       Patient seen in clinic today.  Dressing changed as ordered.      Assessment:              Plan:     No orders of the defined types were placed in this encounter.          No follow-ups on file.        [facility]  [address]   [place]  Nurse Visit    Subjective:       Patient seen in clinic today.  Dressing changed as ordered.      Assessment:              Plan:     No orders of the defined types were placed in this encounter.          No follow-ups on file.        [facility]  [address]   [place]  Nurse Visit    Subjective:       Patient seen in clinic today.  Dressing changed as ordered.      Assessment:              Plan:     No orders of the defined types were placed in this encounter.          No follow-ups on file.          
20-Dec-2021

## 2022-12-09 ENCOUNTER — HOSPITAL ENCOUNTER (OUTPATIENT)
Dept: WOUND CARE | Facility: HOSPITAL | Age: 52
Discharge: HOME | End: 2022-12-09
Attending: PODIATRIST
Payer: MEDICARE

## 2022-12-09 VITALS — HEART RATE: 88 BPM | TEMPERATURE: 98 F | DIASTOLIC BLOOD PRESSURE: 87 MMHG | SYSTOLIC BLOOD PRESSURE: 129 MMHG

## 2022-12-09 DIAGNOSIS — E11.621 DIABETIC ULCER OF RIGHT MIDFOOT ASSOCIATED WITH TYPE 2 DIABETES MELLITUS, WITH BONE INVOLVEMENT WITHOUT EVIDENCE OF NECROSIS: Primary | ICD-10-CM

## 2022-12-09 DIAGNOSIS — M86.671 CHRONIC OSTEOMYELITIS OF RIGHT FOOT: ICD-10-CM

## 2022-12-09 DIAGNOSIS — L97.416 DIABETIC ULCER OF RIGHT MIDFOOT ASSOCIATED WITH TYPE 2 DIABETES MELLITUS, WITH BONE INVOLVEMENT WITHOUT EVIDENCE OF NECROSIS: Primary | ICD-10-CM

## 2022-12-09 PROCEDURE — 99499 NO LOS: ICD-10-PCS | Mod: ,,, | Performed by: PODIATRIST

## 2022-12-09 PROCEDURE — 11042 WOUND DEBRIDEMENT: ICD-10-PCS | Mod: ,,, | Performed by: PODIATRIST

## 2022-12-09 PROCEDURE — 11042 DBRDMT SUBQ TIS 1ST 20SQCM/<: CPT | Mod: ,,, | Performed by: PODIATRIST

## 2022-12-09 PROCEDURE — 99499 UNLISTED E&M SERVICE: CPT | Mod: ,,, | Performed by: PODIATRIST

## 2022-12-09 PROCEDURE — 11042 DBRDMT SUBQ TIS 1ST 20SQCM/<: CPT | Performed by: PODIATRIST

## 2022-12-09 NOTE — PROGRESS NOTES
[facility]                Progress Note    Subjective:       Patient ID: Indio Ryder Jr. is a 52 y.o. male.    Chief Complaint: Wound Check      Follow up wound care visit. Patient ambulated to exam room without assistance, prescribed CAM boot on RLE and Darco shoe on Left Foot. Patient denies pain or discomfort at present. Denies fever, chills or flu-like symptoms. Dressing's intact with a small amount of brown, non-malodor drainage from Right Foot wound and no drainage from Left Foot wound.     Review of Systems   Constitutional:  Negative for appetite change, chills, fatigue and fever.   HENT:  Negative for hearing loss.    Eyes:  Negative for photophobia and visual disturbance.   Respiratory:  Negative for cough, chest tightness, shortness of breath and wheezing.    Cardiovascular:  Positive for leg swelling. Negative for chest pain and palpitations.   Gastrointestinal:  Negative for constipation, diarrhea, nausea and vomiting.   Endocrine: Negative for cold intolerance and heat intolerance.   Genitourinary:  Negative for flank pain.   Musculoskeletal:  Positive for gait problem and myalgias. Negative for neck pain and neck stiffness.   Skin:  Positive for wound. Negative for color change.   Neurological:  Positive for numbness. Negative for weakness, light-headedness and headaches.        + paresthesia    Psychiatric/Behavioral:  Negative for sleep disturbance.        Objective:        Physical Exam  Constitutional:       Appearance: He is well-developed.   Cardiovascular:      Comments: Dorsalis pedis and posterior tibial pulses are palpable Skin is atrophic, slightly hyperpigmented, and mildly edematous      Musculoskeletal:         General: No tenderness. Normal range of motion.      Comments: Muscle strength is 5/5 in all groups bilaterally.    S/p partial 5th ray amp b/l    S/p hallux amp right    Fat pad atrophy to heels and met heads bilateral        Skin:     General: Skin is warm and dry.      Coloration: Skin is not jaundiced, mottled or sallow.      Findings: Wound (see below) present. No abscess or ecchymosis.      Comments:        Neurological:      Mental Status: He is alert and oriented to person, place, and time.      Comments: [place] 5.07 monofilamant testing is diminished Kenji feet. Sharp/dull sensation diminished Bilaterally. Light touch absent Bilaterally.       Psychiatric:         Behavior: Behavior normal.       Vitals:    12/09/22 1039   BP: 129/87   Pulse: 88   Temp: 98.2 °F (36.8 °C)       Assessment:           ICD-10-CM ICD-9-CM   1. Diabetic ulcer of right midfoot associated with type 2 diabetes mellitus, with bone involvement without evidence of necrosis  E11.621 250.80    L97.416 707.14   2. Chronic osteomyelitis of right foot  M86.671 730.17            Wound 04/08/22 1137 Diabetic Ulcer Left plantar;medial Foot (Active)   04/08/22 1137    Pre-existing: Yes   Primary Wound Type: Diabetic ulc   Side: Left   Orientation: plantar;medial   Location: Foot   Wound Number:    Ankle-Brachial Index:    Pulses:    Removal Indication and Assessment:    Wound Outcome:    (Retired) Wound Type:    (Retired) Wound Length (cm):    (Retired) Wound Width (cm):    (Retired) Depth (cm):    Wound Description (Comments):    Removal Indications:    Wound Image   12/09/22 1109   Wound WDL ex 12/09/22 1109   Dressing Appearance Intact;Dry 12/09/22 1109   Drainage Amount None 12/09/22 1109   Appearance Pink 12/09/22 1109   Tissue loss description Partial thickness 12/09/22 1109   Black (%), Wound Tissue Color 0 % 12/09/22 1109   Red (%), Wound Tissue Color 100 % 12/09/22 1109   Yellow (%), Wound Tissue Color 0 % 12/09/22 1109   Periwound Area Dry 12/09/22 1109   Wound Edges Callused 12/09/22 1109   Wound Length (cm) 0.1 cm 12/09/22 1109   Wound Width (cm) 0.1 cm 12/09/22 1109   Wound Depth (cm) 0.1 cm 12/09/22 1109   Wound Volume (cm^3) 0.001 cm^3  12/09/22 1109   Wound Surface Area (cm^2) 0.01 cm^2 12/09/22 1109   Tunneling (depth (cm)/location) 0 12/09/22 1109   Undermining (depth (cm)/location) 0 12/09/22 1109   Care Cleansed with:;Antimicrobial agent;Sterile normal saline 12/09/22 1109   Dressing Changed 12/09/22 1109   Periwound Care Skin barrier film applied 12/09/22 1109   Compression Two layer compression 12/09/22 1109   Off Loading Football dressing;Off loading shoe 12/09/22 1109   Dressing Change Due 12/13/22 12/09/22 1109            Wound 05/06/22 1142 Diabetic Ulcer Right plantar Foot (Active)   05/06/22 1142    Pre-existing: Yes   Primary Wound Type: Diabetic ulc   Side: Right   Orientation: plantar   Location: Foot   Wound Number:    Ankle-Brachial Index:    Pulses:    Removal Indication and Assessment:    Wound Outcome:    (Retired) Wound Type:    (Retired) Wound Length (cm):    (Retired) Wound Width (cm):    (Retired) Depth (cm):    Wound Description (Comments):    Removal Indications:    Wound Image   12/09/22 1109   Wound WDL ex 12/09/22 1109   Dressing Appearance Intact;Moist drainage 12/09/22 1109   Drainage Amount Small 12/09/22 1109   Drainage Characteristics/Odor Brown 12/09/22 1109   Appearance Red 12/09/22 1109   Tissue loss description Full thickness 12/09/22 1109   Black (%), Wound Tissue Color 0 % 12/09/22 1109   Red (%), Wound Tissue Color 100 % 12/09/22 1109   Yellow (%), Wound Tissue Color 0 % 12/09/22 1109   Periwound Area Dry 12/09/22 1109   Wound Edges Callused 12/09/22 1109   Wound Length (cm) 0.5 cm 12/09/22 1109   Wound Width (cm) 0.3 cm 12/09/22 1109   Wound Depth (cm) 0.7 cm 12/09/22 1109   Wound Volume (cm^3) 0.105 cm^3 12/09/22 1109   Wound Surface Area (cm^2) 0.15 cm^2 12/09/22 1109   Tunneling (depth (cm)/location) 0 12/09/22 1109   Undermining (depth (cm)/location) 0 12/09/22 1109   Care Cleansed with:;Antimicrobial agent;Sterile normal saline 12/09/22 1109   Dressing Changed 12/09/22 1109   Periwound Care Moisture  barrier applied 12/09/22 1109   Compression Two layer compression 12/09/22 1109   Off Loading Football dressing;Off loading shoe 12/09/22 1109   Dressing Change Due 12/13/22 12/09/22 1109           Plan:            Left Plantar Foot wound is pinpoint 0.1 cm overall. Right Plantar Foot wound is measuring smaller in (0.8 cm) depth     Wound Debridement    Date/Time: 12/9/2022 9:51 AM  Performed by: Marivel Peterson DPM  Authorized by: Marivel Peterson DPM       Wound Details:    Location:  Right foot    Location:  Right Plantar    Type of Debridement:  Excisional       Length (cm):  0.5       Area (sq cm):  0.15       Width (cm):  0.3       Percent Debrided (%):  100       Depth (cm):  0.7       Total Area Debrided (sq cm):  0.15    Depth of debridement:  Subcutaneous tissue    Tissue debrided:  Dermis, Epidermis and Subcutaneous    Devitalized tissue debrided:  Fibrin and Callus    Instruments:  Curette    Bleeding:  Minimal  Hemostasis Achieved: Yes    Method Used:  Pressure  Patient tolerance:  Patient tolerated the procedure well with no immediate complications      Wound care done as per order. Return to clinic on Tuesday for nurse visit. Then RTC in 1 week to see MD.    Tissue pathology and/or culture taken     [] Yes      [] No  Sharp debridement performed                   [] Yes       [] No  Labs ordered     [] Yes       [] No  Imaging ordered    [] Yes      [] No    Orders Placed This Encounter   Procedures    Debridement     This order was created via procedure documentation     Standing Status:   Standing     Number of Occurrences:   1    Change dressing     Clean with NS.   Bilateral Feet: Gentian violet to periwound. Cavilon/Benzoin to plantar foot or where U-shaped pad and/or felt pad to be placed. RLE: Custom felt pad with small hole over wound to allow for drainage then U-pad to custom felt pad (per MD). Iodosorb (push towards to tunneling) then [place] to wound bed. Drawtex (cut to fit and stacked),  Mextra short x1, abram foam long x2. Football dressing (cast padding x3). Calamine Coflex - calamine touching skin before football.     LLE: U-pad around wound bed. [place] to wound bed. Drawtex (cut to fit). Mextra short x 1. Abram foam long x2. Football dressing (cast padding x3). Calamine Coflex - calamine touching skin before football.     Change Right Foot Dressing only at Nurse Visit per MD.        Follow up in about 4 days (around 12/13/2022) for wound care.

## 2022-12-12 ENCOUNTER — TELEPHONE (OUTPATIENT)
Dept: WOUND CARE | Facility: HOSPITAL | Age: 52
End: 2022-12-12
Payer: MEDICARE

## 2022-12-13 ENCOUNTER — HOSPITAL ENCOUNTER (OUTPATIENT)
Dept: WOUND CARE | Facility: HOSPITAL | Age: 52
Discharge: HOME | End: 2022-12-13
Attending: FAMILY MEDICINE
Payer: MEDICARE

## 2022-12-13 VITALS
SYSTOLIC BLOOD PRESSURE: 162 MMHG | DIASTOLIC BLOOD PRESSURE: 84 MMHG | RESPIRATION RATE: 20 BRPM | TEMPERATURE: 98 F | HEART RATE: 64 BPM

## 2022-12-13 DIAGNOSIS — E11.621 DIABETIC ULCER OF RIGHT MIDFOOT ASSOCIATED WITH TYPE 2 DIABETES MELLITUS, WITH BONE INVOLVEMENT WITHOUT EVIDENCE OF NECROSIS: Primary | ICD-10-CM

## 2022-12-13 DIAGNOSIS — M86.671 CHRONIC OSTEOMYELITIS OF RIGHT FOOT: ICD-10-CM

## 2022-12-13 DIAGNOSIS — L97.416 DIABETIC ULCER OF RIGHT MIDFOOT ASSOCIATED WITH TYPE 2 DIABETES MELLITUS, WITH BONE INVOLVEMENT WITHOUT EVIDENCE OF NECROSIS: Primary | ICD-10-CM

## 2022-12-13 PROCEDURE — 29581 APPL MULTLAYER CMPRN SYS LEG: CPT

## 2022-12-13 NOTE — PROGRESS NOTES
[facility]  [address] [place]  [address]  Nurse Visit    Subjective:       Patient seen in clinic today.  Dressing changed as ordered.      Assessment:          Wound 05/06/22 1142 Diabetic Ulcer Right plantar Foot (Active)   05/06/22 1142    Pre-existing: Yes   Primary Wound Type: Diabetic ulc   Side: Right   Orientation: plantar   Location: Foot   Wound Number:    Ankle-Brachial Index:    Pulses:    Removal Indication and Assessment:    Wound Outcome:    (Retired) Wound Type:    (Retired) Wound Length (cm):    (Retired) Wound Width (cm):    (Retired) Depth (cm):    Wound Description (Comments):    Removal Indications:    Wound WDL ex 12/13/22 0851   Dressing Appearance Intact;Dried drainage 12/13/22 0851   Drainage Amount Scant 12/13/22 0851   Drainage Characteristics/Odor Serosanguineous 12/13/22 0851   Appearance Red 12/13/22 0851   Tissue loss description Partial thickness 12/13/22 0851   Black (%), Wound Tissue Color 0 % 12/13/22 0851   Red (%), Wound Tissue Color 100 % 12/13/22 0851   Yellow (%), Wound Tissue Color 0 % 12/13/22 0851   Periwound Area Pale white;Macerated 12/13/22 0851   Wound Edges Defined 12/13/22 0851   Care Cleansed with:;Soap and water;Sterile normal saline 12/13/22 0851   Dressing Changed 12/13/22 0851   Periwound Care Moisture barrier applied 12/13/22 0851   Compression Two layer compression 12/13/22 0851   Off Loading Football dressing;Off loading shoe 12/13/22 0851   Dressing Change Due 12/16/22 12/13/22 0851           Plan:     No orders of the defined types were placed in this encounter.          Follow up in about 3 days (around 12/16/2022) for MD wound care visit.

## 2022-12-16 ENCOUNTER — HOSPITAL ENCOUNTER (OUTPATIENT)
Dept: WOUND CARE | Facility: HOSPITAL | Age: 52
Discharge: HOME | End: 2022-12-16
Attending: PODIATRIST
Payer: MEDICARE

## 2022-12-16 VITALS — DIASTOLIC BLOOD PRESSURE: 69 MMHG | HEART RATE: 105 BPM | SYSTOLIC BLOOD PRESSURE: 151 MMHG

## 2022-12-16 DIAGNOSIS — E11.621 DIABETIC ULCER OF RIGHT MIDFOOT ASSOCIATED WITH TYPE 2 DIABETES MELLITUS, WITH BONE INVOLVEMENT WITHOUT EVIDENCE OF NECROSIS: Primary | ICD-10-CM

## 2022-12-16 DIAGNOSIS — E11.621 DIABETIC ULCER OF LEFT FOOT: ICD-10-CM

## 2022-12-16 DIAGNOSIS — M86.671 CHRONIC OSTEOMYELITIS OF RIGHT FOOT: ICD-10-CM

## 2022-12-16 DIAGNOSIS — L97.416 DIABETIC ULCER OF RIGHT MIDFOOT ASSOCIATED WITH TYPE 2 DIABETES MELLITUS, WITH BONE INVOLVEMENT WITHOUT EVIDENCE OF NECROSIS: Primary | ICD-10-CM

## 2022-12-16 DIAGNOSIS — L97.529 DIABETIC ULCER OF LEFT FOOT: ICD-10-CM

## 2022-12-16 PROCEDURE — 11043 DBRDMT MUSC&/FSCA 1ST 20/<: CPT | Mod: ,,, | Performed by: PODIATRIST

## 2022-12-16 PROCEDURE — 99499 UNLISTED E&M SERVICE: CPT | Mod: ,,, | Performed by: PODIATRIST

## 2022-12-16 PROCEDURE — 99499 NO LOS: ICD-10-PCS | Mod: ,,, | Performed by: PODIATRIST

## 2022-12-16 PROCEDURE — 87070 CULTURE OTHR SPECIMN AEROBIC: CPT | Performed by: PODIATRIST

## 2022-12-16 PROCEDURE — 11043 WOUND DEBRIDEMENT: ICD-10-PCS | Mod: ,,, | Performed by: PODIATRIST

## 2022-12-16 PROCEDURE — 87205 SMEAR GRAM STAIN: CPT | Performed by: PODIATRIST

## 2022-12-16 PROCEDURE — 11042 DBRDMT SUBQ TIS 1ST 20SQCM/<: CPT

## 2022-12-16 PROCEDURE — 87075 CULTR BACTERIA EXCEPT BLOOD: CPT | Performed by: PODIATRIST

## 2022-12-16 PROCEDURE — 87077 CULTURE AEROBIC IDENTIFY: CPT | Performed by: PODIATRIST

## 2022-12-16 PROCEDURE — 11043 DBRDMT MUSC&/FSCA 1ST 20/<: CPT

## 2022-12-16 PROCEDURE — 87186 SC STD MICRODIL/AGAR DIL: CPT | Performed by: PODIATRIST

## 2022-12-16 RX ORDER — DOXYCYCLINE 100 MG/1
100 CAPSULE ORAL 2 TIMES DAILY
Qty: 60 CAPSULE | Refills: 0 | Status: SHIPPED | OUTPATIENT
Start: 2022-12-16 | End: 2023-01-15

## 2022-12-16 NOTE — PROGRESS NOTES
[facility]                Progress Note    Subjective:       Patient ID: Indio Ryder Jr. is a 52 y.o. male.    Chief Complaint: Wound Check    Follow up visit for bilateral foot wounds. Patient ambulatory with off set boot. Pt denies any pain or discomfort.     Review of Systems   Constitutional:  Negative for appetite change, chills, fatigue and fever.   HENT:  Negative for hearing loss.    Eyes:  Negative for photophobia and visual disturbance.   Respiratory:  Negative for cough, chest tightness, shortness of breath and wheezing.    Cardiovascular:  Positive for leg swelling. Negative for chest pain and palpitations.   Gastrointestinal:  Negative for constipation, diarrhea, nausea and vomiting.   Endocrine: Negative for cold intolerance and heat intolerance.   Genitourinary:  Negative for flank pain.   Musculoskeletal:  Positive for gait problem and myalgias. Negative for neck pain and neck stiffness.   Skin:  Positive for wound. Negative for color change.   Neurological:  Positive for numbness. Negative for weakness, light-headedness and headaches.        + paresthesia    Psychiatric/Behavioral:  Negative for sleep disturbance.        Objective:        Physical Exam  Constitutional:       Appearance: He is well-developed.   Cardiovascular:      Comments: Dorsalis pedis and posterior tibial pulses are palpable Skin is atrophic, slightly hyperpigmented, and mildly edematous      Musculoskeletal:         General: No tenderness. Normal range of motion.      Comments: Muscle strength is 5/5 in all groups bilaterally.    S/p partial 5th ray amp b/l    S/p hallux amp right    Fat pad atrophy to heels and met heads bilateral       Skin:     General: Skin is warm and dry.      Coloration: Skin is not jaundiced, mottled or sallow.      Findings: Wound (see below) present. No abscess or ecchymosis.      Comments:        Neurological:      Mental Status: He is alert and  oriented to person, place, and time.      Comments: [place]-Nenita 5.07 monofilamant testing is diminished Kenji feet. Sharp/dull sensation diminished Bilaterally. Light touch absent Bilaterally.       Psychiatric:         Behavior: Behavior normal.       Vitals:    12/16/22 1106   BP: (!) 151/69   Pulse: 105   Temp: (P) 98.3 °F (36.8 °C)       Assessment:           ICD-10-CM ICD-9-CM   1. Diabetic ulcer of right midfoot associated with type 2 diabetes mellitus, with bone involvement without evidence of necrosis  E11.621 250.80    L97.416 707.14   2. Chronic osteomyelitis of right foot  M86.671 730.17   3. Diabetic ulcer of left foot  E11.621 250.80    L97.529 707.15            Wound 05/06/22 1142 Diabetic Ulcer Right plantar Foot (Active)   05/06/22 1142    Pre-existing: Yes   Primary Wound Type: Diabetic ulc   Side: Right   Orientation: plantar   Location: Foot   Wound Number:    Ankle-Brachial Index:    Pulses:    Removal Indication and Assessment:    Wound Outcome:    (Retired) Wound Type:    (Retired) Wound Length (cm):    (Retired) Wound Width (cm):    (Retired) Depth (cm):    Wound Description (Comments):    Removal Indications:    Wound Image   12/16/22 1123   Wound WDL WDL 12/16/22 1123   Dressing Appearance Dry 12/16/22 1123   Drainage Amount None 12/16/22 1123   Appearance Pink 12/16/22 1123   Tissue loss description Full thickness 12/16/22 1123   Red (%), Wound Tissue Color 100 % 12/16/22 1123   Periwound Area Macerated 12/16/22 1123   Wound Length (cm) 1.7 cm 12/16/22 1123   Wound Width (cm) 1.4 cm 12/16/22 1123   Wound Depth (cm) 0.7 cm 12/16/22 1123   Wound Volume (cm^3) 1.666 cm^3 12/16/22 1123   Wound Surface Area (cm^2) 2.38 cm^2 12/16/22 1123   Care Cleansed with:;Antimicrobial agent;Sterile normal saline 12/16/22 1123           Plan:            Purulence noted to wound bed coming from proximal medial midfoot          Wound Debridement    Date/Time: 12/16/2022 11:00 AM  Performed by: Marivel PAYNE  CLEM Peterson  Authorized by: Marivel Peterson DPM     Consent Done?:  Yes (Written)  Local anesthesia used?: No      Wound Details:    Location:  Right foot    Location:  Right Plantar (forefoot)    Type of Debridement:  Excisional       Length (cm):  1.7       Area (sq cm):  2.38       Width (cm):  1.4       Percent Debrided (%):  100       Depth (cm):  0.7       Total Area Debrided (sq cm):  2.38    Depth of debridement:  Muscle/fascia/tendon    Tissue debrided:  Dermis, Epidermis, Tendon and Subcutaneous    Devitalized tissue debrided:  Callus and Fibrin    Instruments:  Curette, Forceps and Scissors    Bleeding:  Minimal  Hemostasis Achieved: Yes    Method Used:  Pressure  Patient tolerance:  Patient tolerated the procedure well with no immediate complications     Pt for hyperbarics on Monday. Instructed pt per Nieves. Patient also instructed on went to follow up for wound check. Patient verbalizes that he understands.      Tissue pathology and/or culture taken     [] Yes      [x] No  Sharp debridement performed                   [x] Yes       [] No  Labs ordered     [x] Yes       [] No  Imaging ordered    [] Yes      [x] No    Orders Placed This Encounter   Procedures    Debridement     This order was created via procedure documentation     Standing Status:   Standing     Number of Occurrences:   1    Change dressing     Clean with NS.   Bilateral Feet: Gentian violet to periwound. Cavilon/Benzoin to plantar foot or where U-shaped pad and/or felt pad to be placed. RLE: Custom felt pad with small hole over wound to allow for drainage then U-pad to custom felt pad (per MD). Iodosorb (push towards to tunneling) then [place] to wound bed. Drawtex (cut to fit and stacked), Mextra short x1, abram foam long x2. Football dressing (cast padding x3). Calamine Coflex - calamine touching skin before football.     LLE: U-pad around wound bed. [place] to wound bed. Drawtex (cut to fit). Mextra short x 1. Abram foam long x2.  Football dressing (cast padding x3). Calamine Coflex - calamine touching skin before football.        Follow up in about 1 week (around 12/23/2022) for wound care.

## 2022-12-17 LAB
GRAM STN SPEC: NORMAL

## 2022-12-18 LAB — BACTERIA SPEC AEROBE CULT: ABNORMAL

## 2022-12-19 ENCOUNTER — HOSPITAL ENCOUNTER (OUTPATIENT)
Dept: WOUND CARE | Facility: HOSPITAL | Age: 52
Discharge: HOME | End: 2022-12-19
Attending: INTERNAL MEDICINE
Payer: MEDICARE

## 2022-12-19 DIAGNOSIS — E11.621 DIABETIC ULCER OF RIGHT FOOT ASSOCIATED WITH TYPE 2 DIABETES MELLITUS, WITH FAT LAYER EXPOSED: ICD-10-CM

## 2022-12-19 DIAGNOSIS — M86.60 ACUTE ON CHRONIC OSTEOMYELITIS: ICD-10-CM

## 2022-12-19 DIAGNOSIS — L97.512 DIABETIC ULCER OF RIGHT FOOT ASSOCIATED WITH TYPE 2 DIABETES MELLITUS, WITH FAT LAYER EXPOSED: ICD-10-CM

## 2022-12-19 DIAGNOSIS — E11.621 DIABETIC ULCER OF RIGHT MIDFOOT ASSOCIATED WITH TYPE 2 DIABETES MELLITUS, WITH BONE INVOLVEMENT WITHOUT EVIDENCE OF NECROSIS: ICD-10-CM

## 2022-12-19 DIAGNOSIS — M86.671 CHRONIC OSTEOMYELITIS OF RIGHT FOOT: Primary | ICD-10-CM

## 2022-12-19 DIAGNOSIS — L97.416 DIABETIC ULCER OF RIGHT MIDFOOT ASSOCIATED WITH TYPE 2 DIABETES MELLITUS, WITH BONE INVOLVEMENT WITHOUT EVIDENCE OF NECROSIS: ICD-10-CM

## 2022-12-19 DIAGNOSIS — M86.10 ACUTE ON CHRONIC OSTEOMYELITIS: ICD-10-CM

## 2022-12-19 LAB — POCT GLUCOSE: 270 MG/DL (ref 70–110)

## 2022-12-19 PROCEDURE — 82962 GLUCOSE BLOOD TEST: CPT | Performed by: INTERNAL MEDICINE

## 2022-12-19 PROCEDURE — G0277 HBOT, FULL BODY CHAMBER, 30M: HCPCS | Performed by: INTERNAL MEDICINE

## 2022-12-19 PROCEDURE — 99183 HYPERBARIC OXYGEN THERAPY: CPT | Mod: ,,, | Performed by: INTERNAL MEDICINE

## 2022-12-19 PROCEDURE — 99183 PR HYPERBARIC OXYGEN THERAPY ATTENDANCE/SUPERVISION, PER SESSION: ICD-10-PCS | Mod: ,,, | Performed by: INTERNAL MEDICINE

## 2022-12-19 NOTE — PROGRESS NOTES
12/19/22 1030   Hyperbaric Pre-Inspection   Mattress cleaned prior to treatment Yes   2 Patient Identifiers Verified Yes   Consent Obtained Yes   Cotton Gown Yes   Patient voided Yes   Drainage Bags Emptied Not applicable   Patient Pregnant Not applicable   Glasses, Jewelry, Makeup, etc. Removed Yes   Dentures, Hearing Aid, Prosthetic Devices Removed Yes   Touch hair to check for hair spray Yes   Cold/Flu Symptoms No   Diabetic Patient Eaten Yes   Medications Given Not applicable   Fears and apprehensions verbalized Yes   Ground Wire Secured Yes   HBO Treatment Course Details   Treatment Course Number 1   Ordering Provider Jennifer SCHILLING   Indications Diabetic wounds of lower extremities   HBO Treatment Start Date 12/19/22   HBO Treatment Details   Treatment Number 1   Total Treatment Length Calc (minutes) 108   Chamber Type Monoplace   Chamber # 2   HBO Dive Log # 1136   Treatment Protocol 2.0 SURI x 90 minutes w/100% oxygen and no air break   Treatment Details   Dive Rate Down 1.5 psi/minute   Dive Rate Up 2.0 psi/minute   Compress Begins 1 SURI   Clock Time 1117   Tx Pressure Reached 2 SURI   Clock Time 1127   Decompress Begins 2 SURI   Clock Time 1257   Decompress Ends 1 SURI   Clock Time 1305   Pre HBO Vital Signs   BP (!) 160/86   Pulse 88   Resp 18   Temp 97.7 °F (36.5 °C)   Blood Glucose 270   Glucose Meter # wc   Pain Level 0   Post HBO Vital Signs   BP (!) 188/78   Pulse 92   Resp 16   Blood Glucose 228   Glucose Meter # wc   Pain Level 0   Ear Evaluation   Left Teed Scale Pre Grade 0   Left Teed Scale Post Grade I   Right Teed Scale Pre Grade 0   Right Teed Scale Post Grade 0         Arrived awake and alert. ABC's intact. Ambulated without assistance. Cleared for treatment by Waylon SCHILLING Tolerated treatment without complaints.  Pt related pressure in left ear but was able to clear during descent. Cleared from chamber without incidents. Follow up tomorrow for HBO.         ICD-10-CM ICD-9-CM   1. Chronic  osteomyelitis of right foot  M86.671 730.17   2. Diabetic ulcer of right foot associated with type 2 diabetes mellitus, with fat layer exposed  E11.621 250.80    L97.512 707.15   3. Acute on chronic osteomyelitis  M86.10 730.00    M86.60 730.10   4. Diabetic ulcer of right midfoot associated with type 2 diabetes mellitus, with bone involvement without evidence of necrosis  E11.621 250.80    L97.416 707.14          Physician Supervision  I provided direct supervision and was immediately available to furnish assistance and direction throughout the performance of the procedure.    Emergency Response Team  A trained emergency response team and emergency services were available throughout procedure.

## 2022-12-20 ENCOUNTER — HOSPITAL ENCOUNTER (OUTPATIENT)
Dept: WOUND CARE | Facility: HOSPITAL | Age: 52
Discharge: HOME | End: 2022-12-20
Attending: FAMILY MEDICINE
Payer: MEDICARE

## 2022-12-20 DIAGNOSIS — M86.671 CHRONIC OSTEOMYELITIS OF RIGHT FOOT: Primary | ICD-10-CM

## 2022-12-20 DIAGNOSIS — M86.60 ACUTE ON CHRONIC OSTEOMYELITIS: ICD-10-CM

## 2022-12-20 DIAGNOSIS — E11.621 DIABETIC ULCER OF RIGHT MIDFOOT ASSOCIATED WITH TYPE 2 DIABETES MELLITUS, WITH BONE INVOLVEMENT WITHOUT EVIDENCE OF NECROSIS: ICD-10-CM

## 2022-12-20 DIAGNOSIS — L97.512 DIABETIC ULCER OF RIGHT FOOT ASSOCIATED WITH TYPE 2 DIABETES MELLITUS, WITH FAT LAYER EXPOSED: ICD-10-CM

## 2022-12-20 DIAGNOSIS — R09.81 NASAL CONGESTION: Primary | ICD-10-CM

## 2022-12-20 DIAGNOSIS — L97.416 DIABETIC ULCER OF RIGHT MIDFOOT ASSOCIATED WITH TYPE 2 DIABETES MELLITUS, WITH BONE INVOLVEMENT WITHOUT EVIDENCE OF NECROSIS: ICD-10-CM

## 2022-12-20 DIAGNOSIS — E11.621 DIABETIC ULCER OF RIGHT FOOT ASSOCIATED WITH TYPE 2 DIABETES MELLITUS, WITH FAT LAYER EXPOSED: ICD-10-CM

## 2022-12-20 DIAGNOSIS — M86.10 ACUTE ON CHRONIC OSTEOMYELITIS: ICD-10-CM

## 2022-12-20 LAB
BACTERIA SPEC ANAEROBE CULT: NORMAL
POCT GLUCOSE: 117 MG/DL (ref 70–110)
POCT GLUCOSE: 197 MG/DL (ref 70–110)
POCT GLUCOSE: 228 MG/DL (ref 70–110)

## 2022-12-20 PROCEDURE — 99183 HYPERBARIC OXYGEN THERAPY: CPT | Mod: ,,, | Performed by: FAMILY MEDICINE

## 2022-12-20 PROCEDURE — 82962 GLUCOSE BLOOD TEST: CPT | Mod: 91 | Performed by: FAMILY MEDICINE

## 2022-12-20 PROCEDURE — 99183 PR HYPERBARIC OXYGEN THERAPY ATTENDANCE/SUPERVISION, PER SESSION: ICD-10-PCS | Mod: ,,, | Performed by: FAMILY MEDICINE

## 2022-12-20 PROCEDURE — G0277 HBOT, FULL BODY CHAMBER, 30M: HCPCS | Performed by: FAMILY MEDICINE

## 2022-12-20 RX ORDER — FLUTICASONE PROPIONATE 50 MCG
2 SPRAY, SUSPENSION (ML) NASAL DAILY
Qty: 16 G | Refills: 1 | Status: SHIPPED | OUTPATIENT
Start: 2022-12-20 | End: 2023-07-27

## 2022-12-20 NOTE — PROGRESS NOTES
12/20/22 1030   Hyperbaric Pre-Inspection   Mattress cleaned prior to treatment Yes   2 Patient Identifiers Verified Yes   Consent Obtained Yes   Cotton Gown Yes   Patient voided Yes   Drainage Bags Emptied Not applicable   Patient Pregnant Not applicable   Glasses, Jewelry, Makeup, etc. Removed Yes   Dentures, Hearing Aid, Prosthetic Devices Removed Yes   Touch hair to check for hair spray Yes   Cold/Flu Symptoms No   Diabetic Patient Eaten Yes   Medications Given Not applicable   Fears and apprehensions verbalized Yes   Ground Wire Secured Yes   HBO Treatment Course Details   Treatment Course Number 1   Ordering Provider Jennifer SCHILLING   Indications Diabetic wounds of lower extremities   HBO Treatment Start Date 12/19/22   HBO Treatment Details   Treatment Number 2   Inpatient Visit No   Total Treatment Length Calc (minutes) 106   Chamber Type Monoplace   Chamber # 2   HBO Dive Log # 1137   Treatment Protocol 2.0 SURI x 90 minutes w/100% oxygen and no air break   Treatment Details   Dive Rate Down 1.0 psi/minute   Dive Rate Up 2.0 psi/minute   Compress Begins 1 SURI   Clock Time 1052   Tx Pressure Reached 2 SURI   Clock Time 1100   Decompress Begins 2 SURI   Clock Time 1230   Decompress Ends 1 SURI   Clock Time 1238   Pre HBO Vital Signs   BP (!) 144/73   Pulse 89   Resp 18   Temp 98.2 °F (36.8 °C)   Blood Glucose 197   Glucose Meter # wc   Pain Level 0   Post HBO Vital Signs   BP (!) 182/89   Pulse 77   Resp 16   Blood Glucose 117   Glucose Meter # wc   Pain Level 0   Ear Evaluation   Left Teed Scale Pre Grade I   Left Teed Scale Post Grade II   Right Teed Scale Pre Grade 0   Right Teed Scale Post Grade 0         Arrived awake and alert. ABC's intact. Ambulated without assistance. Cleared for treatment by Roberto SCHILLING Tolerated treatment without complaints. Cleared from chamber without incidents. Follow up tomorrow for HBO.         ICD-10-CM ICD-9-CM   1. Chronic osteomyelitis of right foot  M86.671 730.17   2.  Diabetic ulcer of right foot associated with type 2 diabetes mellitus, with fat layer exposed  E11.621 250.80    L97.512 707.15   3. 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 Acute on chronic osteomyelitis  M86.10 730.00    M86.60 730.10          Physician Supervision  I provided direct supervision and was immediately available to furnish assistance and direction throughout the performance of the procedure.    Emergency Response Team  A trained emergency response team and emergency services were available throughout procedure.

## 2022-12-21 ENCOUNTER — HOSPITAL ENCOUNTER (OUTPATIENT)
Dept: WOUND CARE | Facility: HOSPITAL | Age: 52
Discharge: HOME | End: 2022-12-21
Attending: FAMILY MEDICINE
Payer: MEDICARE

## 2022-12-21 ENCOUNTER — PATIENT MESSAGE (OUTPATIENT)
Dept: INTERNAL MEDICINE | Facility: CLINIC | Age: 52
End: 2022-12-21
Payer: MEDICARE

## 2022-12-21 DIAGNOSIS — M86.60 ACUTE ON CHRONIC OSTEOMYELITIS: ICD-10-CM

## 2022-12-21 DIAGNOSIS — M86.671 CHRONIC OSTEOMYELITIS OF RIGHT FOOT: Primary | ICD-10-CM

## 2022-12-21 DIAGNOSIS — E11.621 DIABETIC ULCER OF RIGHT MIDFOOT ASSOCIATED WITH TYPE 2 DIABETES MELLITUS, WITH BONE INVOLVEMENT WITHOUT EVIDENCE OF NECROSIS: ICD-10-CM

## 2022-12-21 DIAGNOSIS — E11.621 DIABETIC ULCER OF RIGHT FOOT ASSOCIATED WITH TYPE 2 DIABETES MELLITUS, WITH FAT LAYER EXPOSED: ICD-10-CM

## 2022-12-21 DIAGNOSIS — M86.10 ACUTE ON CHRONIC OSTEOMYELITIS: ICD-10-CM

## 2022-12-21 DIAGNOSIS — L97.512 DIABETIC ULCER OF RIGHT FOOT ASSOCIATED WITH TYPE 2 DIABETES MELLITUS, WITH FAT LAYER EXPOSED: ICD-10-CM

## 2022-12-21 DIAGNOSIS — L97.416 DIABETIC ULCER OF RIGHT MIDFOOT ASSOCIATED WITH TYPE 2 DIABETES MELLITUS, WITH BONE INVOLVEMENT WITHOUT EVIDENCE OF NECROSIS: ICD-10-CM

## 2022-12-21 LAB
POCT GLUCOSE: 188 MG/DL (ref 70–110)
POCT GLUCOSE: 227 MG/DL (ref 70–110)

## 2022-12-21 PROCEDURE — G0277 HBOT, FULL BODY CHAMBER, 30M: HCPCS | Performed by: FAMILY MEDICINE

## 2022-12-21 PROCEDURE — 99183 HYPERBARIC OXYGEN THERAPY: CPT | Mod: ,,, | Performed by: FAMILY MEDICINE

## 2022-12-21 PROCEDURE — 99183 PR HYPERBARIC OXYGEN THERAPY ATTENDANCE/SUPERVISION, PER SESSION: ICD-10-PCS | Mod: ,,, | Performed by: FAMILY MEDICINE

## 2022-12-21 PROCEDURE — 82962 GLUCOSE BLOOD TEST: CPT | Mod: 91 | Performed by: FAMILY MEDICINE

## 2022-12-21 RX ORDER — SEMAGLUTIDE 1.34 MG/ML
INJECTION, SOLUTION SUBCUTANEOUS
Qty: 3 PEN | Refills: 0 | Status: CANCELLED | OUTPATIENT
Start: 2022-12-21 | End: 2024-01-20

## 2022-12-21 RX ORDER — SEMAGLUTIDE 1.34 MG/ML
INJECTION, SOLUTION SUBCUTANEOUS
Qty: 3 PEN | Refills: 3 | Status: SHIPPED | OUTPATIENT
Start: 2022-12-21 | End: 2023-01-17

## 2022-12-21 NOTE — TELEPHONE ENCOUNTER
No new care gaps identified.  [facility] Embedded Care Gaps. Reference number: [id number]. 12/21/2022   7:21:13 AM CST

## 2022-12-21 NOTE — PROGRESS NOTES
12/21/22 1000   Hyperbaric Pre-Inspection   Mattress cleaned prior to treatment Yes   2 Patient Identifiers Verified Yes   Consent Obtained Yes   Cotton Gown Yes   Patient voided Yes   Drainage Bags Emptied Not applicable   Patient Pregnant Not applicable   Glasses, Jewelry, Makeup, etc. Removed Yes   Dentures, Hearing Aid, Prosthetic Devices Removed Yes   Touch hair to check for hair spray Yes   Cold/Flu Symptoms No   Diabetic Patient Eaten Not applicable   Medications Given Not applicable   Fears and apprehensions verbalized Yes   Ground Wire Secured Yes   HBO Treatment Course Details   Treatment Course Number 1   Ordering Provider Jennifer SCHILLING   Indications Diabetic wounds of lower extremities   HBO Treatment Start Date 12/19/22   HBO Treatment Details   Treatment Number 3   Inpatient Visit No   Total Treatment Length Calc (minutes) 113   Chamber Type Monoplace   Chamber # 2   HBO Dive Log # 1139   Treatment Protocol 2.0 SURI x 90 minutes w/100% oxygen and no air break   Treatment Details   Dive Rate Down 1.0 psi/minute   Dive Rate Up 2.0 psi/minute   Compress Begins 1 SURI   Clock Time 1030   Tx Pressure Reached 2 SURI   Clock Time 1045   Decompress Begins 2 SURI   Clock Time 1215   Decompress Ends 1 SURI   Clock Time 1223   Pre HBO Vital Signs   BP (!) 141/81   Pulse 94   Resp 18   Temp 97.7 °F (36.5 °C)   Blood Glucose 227   Glucose Meter # WC   Pain Level 0   Post HBO Vital Signs   BP (!) 146/81   Pulse 94   Resp 16   Blood Glucose 188   Glucose Meter # WC   Pain Level 0   Ear Evaluation   Left Teed Scale Pre Grade I   Left Teed Scale Post Grade II   Right Teed Scale Pre Grade 0   Right Teed Scale Post Grade 0         Arrived awake and alert. ABC's intact. Ambulated without assistance. Cleared for treatment by Jennifer SCHILLING Tolerated treatment without complaints. Cleared from chamber without incidents. Follow up tomorrow for HBO.         ICD-10-CM ICD-9-CM   1. Chronic osteomyelitis of right foot  M86.671 730.17    2. Diabetic ulcer of right foot associated with type 2 diabetes mellitus, with fat layer exposed  E11.621 250.80    L97.512 707.15   3. Acute on chronic osteomyelitis  M86.10 730.00    M86.60 730.10   4. Diabetic ulcer of right midfoot associated with type 2 diabetes mellitus, with bone involvement without evidence of necrosis  E11.621 250.80    L97.416 707.14          Physician Supervision  I provided direct supervision and was immediately available to furnish assistance and direction throughout the performance of the procedure.    Emergency Response Team  A trained emergency response team and emergency services were available throughout procedure.

## 2022-12-22 ENCOUNTER — HOSPITAL ENCOUNTER (OUTPATIENT)
Dept: WOUND CARE | Facility: HOSPITAL | Age: 52
Discharge: HOME | End: 2022-12-22
Attending: FAMILY MEDICINE
Payer: MEDICARE

## 2022-12-22 DIAGNOSIS — M86.671 CHRONIC OSTEOMYELITIS OF RIGHT FOOT: Primary | ICD-10-CM

## 2022-12-22 DIAGNOSIS — M86.10 ACUTE ON CHRONIC OSTEOMYELITIS: ICD-10-CM

## 2022-12-22 DIAGNOSIS — L97.412 DIABETIC ULCER OF RIGHT MIDFOOT ASSOCIATED WITH TYPE 2 DIABETES MELLITUS, WITH FAT LAYER EXPOSED: ICD-10-CM

## 2022-12-22 DIAGNOSIS — E11.621 DIABETIC ULCER OF RIGHT MIDFOOT ASSOCIATED WITH TYPE 2 DIABETES MELLITUS, WITH FAT LAYER EXPOSED: ICD-10-CM

## 2022-12-22 DIAGNOSIS — L97.512 DIABETIC ULCER OF RIGHT FOOT ASSOCIATED WITH TYPE 2 DIABETES MELLITUS, WITH FAT LAYER EXPOSED: ICD-10-CM

## 2022-12-22 DIAGNOSIS — L97.416 DIABETIC ULCER OF RIGHT MIDFOOT ASSOCIATED WITH TYPE 2 DIABETES MELLITUS, WITH BONE INVOLVEMENT WITHOUT EVIDENCE OF NECROSIS: ICD-10-CM

## 2022-12-22 DIAGNOSIS — E11.621 DIABETIC ULCER OF RIGHT FOOT ASSOCIATED WITH TYPE 2 DIABETES MELLITUS, WITH FAT LAYER EXPOSED: ICD-10-CM

## 2022-12-22 DIAGNOSIS — E11.621 DIABETIC ULCER OF RIGHT MIDFOOT ASSOCIATED WITH TYPE 2 DIABETES MELLITUS, WITH BONE INVOLVEMENT WITHOUT EVIDENCE OF NECROSIS: ICD-10-CM

## 2022-12-22 DIAGNOSIS — M86.60 ACUTE ON CHRONIC OSTEOMYELITIS: ICD-10-CM

## 2022-12-22 LAB
POCT GLUCOSE: 211 MG/DL (ref 70–110)
POCT GLUCOSE: 261 MG/DL (ref 70–110)

## 2022-12-22 PROCEDURE — G0277 HBOT, FULL BODY CHAMBER, 30M: HCPCS | Performed by: FAMILY MEDICINE

## 2022-12-22 PROCEDURE — 82962 GLUCOSE BLOOD TEST: CPT | Performed by: FAMILY MEDICINE

## 2022-12-22 PROCEDURE — 99183 HYPERBARIC OXYGEN THERAPY: CPT | Mod: ,,, | Performed by: FAMILY MEDICINE

## 2022-12-22 PROCEDURE — 99183 PR HYPERBARIC OXYGEN THERAPY ATTENDANCE/SUPERVISION, PER SESSION: ICD-10-PCS | Mod: ,,, | Performed by: FAMILY MEDICINE

## 2022-12-23 ENCOUNTER — HOSPITAL ENCOUNTER (OUTPATIENT)
Dept: WOUND CARE | Facility: HOSPITAL | Age: 52
Discharge: HOME | End: 2022-12-23
Attending: FAMILY MEDICINE
Payer: MEDICARE

## 2022-12-23 ENCOUNTER — HOSPITAL ENCOUNTER (OUTPATIENT)
Dept: WOUND CARE | Facility: HOSPITAL | Age: 52
Discharge: HOME | End: 2022-12-23
Attending: PODIATRIST
Payer: MEDICARE

## 2022-12-23 VITALS — TEMPERATURE: 98 F | DIASTOLIC BLOOD PRESSURE: 83 MMHG | SYSTOLIC BLOOD PRESSURE: 141 MMHG | HEART RATE: 84 BPM

## 2022-12-23 VITALS — TEMPERATURE: 98 F | SYSTOLIC BLOOD PRESSURE: 141 MMHG | HEART RATE: 93 BPM | DIASTOLIC BLOOD PRESSURE: 83 MMHG

## 2022-12-23 DIAGNOSIS — L97.512 DIABETIC ULCER OF RIGHT FOOT ASSOCIATED WITH TYPE 2 DIABETES MELLITUS, WITH FAT LAYER EXPOSED: ICD-10-CM

## 2022-12-23 DIAGNOSIS — M86.671 CHRONIC OSTEOMYELITIS OF RIGHT FOOT: ICD-10-CM

## 2022-12-23 DIAGNOSIS — M86.60 ACUTE ON CHRONIC OSTEOMYELITIS: ICD-10-CM

## 2022-12-23 DIAGNOSIS — E11.621 DIABETIC ULCER OF RIGHT FOOT ASSOCIATED WITH TYPE 2 DIABETES MELLITUS, WITH FAT LAYER EXPOSED: Primary | ICD-10-CM

## 2022-12-23 DIAGNOSIS — L97.416 DIABETIC ULCER OF RIGHT MIDFOOT ASSOCIATED WITH TYPE 2 DIABETES MELLITUS, WITH BONE INVOLVEMENT WITHOUT EVIDENCE OF NECROSIS: ICD-10-CM

## 2022-12-23 DIAGNOSIS — E11.621 DIABETIC ULCER OF RIGHT FOOT ASSOCIATED WITH TYPE 2 DIABETES MELLITUS, WITH FAT LAYER EXPOSED: ICD-10-CM

## 2022-12-23 DIAGNOSIS — L97.512 DIABETIC ULCER OF RIGHT FOOT ASSOCIATED WITH TYPE 2 DIABETES MELLITUS, WITH FAT LAYER EXPOSED: Primary | ICD-10-CM

## 2022-12-23 DIAGNOSIS — M86.10 ACUTE ON CHRONIC OSTEOMYELITIS: ICD-10-CM

## 2022-12-23 DIAGNOSIS — M86.671 CHRONIC OSTEOMYELITIS OF RIGHT FOOT: Primary | ICD-10-CM

## 2022-12-23 DIAGNOSIS — E11.621 DIABETIC ULCER OF RIGHT MIDFOOT ASSOCIATED WITH TYPE 2 DIABETES MELLITUS, WITH BONE INVOLVEMENT WITHOUT EVIDENCE OF NECROSIS: ICD-10-CM

## 2022-12-23 DIAGNOSIS — L97.512 DIABETIC ULCER OF OTHER PART OF RIGHT FOOT ASSOCIATED WITH TYPE 2 DIABETES MELLITUS, WITH FAT LAYER EXPOSED: ICD-10-CM

## 2022-12-23 DIAGNOSIS — E11.621 DIABETIC ULCER OF OTHER PART OF RIGHT FOOT ASSOCIATED WITH TYPE 2 DIABETES MELLITUS, WITH FAT LAYER EXPOSED: ICD-10-CM

## 2022-12-23 LAB — POCT GLUCOSE: 198 MG/DL (ref 70–110)

## 2022-12-23 PROCEDURE — 15275 PR SKIN SUB GRAFT FACE/NK/HF/G UP TO 100 SQCM: ICD-10-PCS | Mod: ,,, | Performed by: PODIATRIST

## 2022-12-23 PROCEDURE — 99499 UNLISTED E&M SERVICE: CPT | Mod: ,,, | Performed by: PODIATRIST

## 2022-12-23 PROCEDURE — 99499 NO LOS: ICD-10-PCS | Mod: ,,, | Performed by: PODIATRIST

## 2022-12-23 PROCEDURE — 15271 SKIN SUB GRAFT TRNK/ARM/LEG: CPT | Performed by: PODIATRIST

## 2022-12-23 PROCEDURE — 82962 GLUCOSE BLOOD TEST: CPT | Performed by: FAMILY MEDICINE

## 2022-12-23 PROCEDURE — 15275 SKIN SUB GRAFT FACE/NK/HF/G: CPT | Mod: ,,, | Performed by: PODIATRIST

## 2022-12-23 NOTE — PROGRESS NOTES
[facility]                Progress Note    Subjective:       Patient ID: Indio Ryder Jr. is a 52 y.o. male.    Chief Complaint: Wound Check    Walked to clinic unaided wearing boot and Darco for follow up of bilateral foot wounds. No new pedal complaints    Review of Systems   Constitutional:  Negative for appetite change, chills, fatigue and fever.   HENT:  Negative for hearing loss.    Eyes:  Negative for photophobia and visual disturbance.   Respiratory:  Negative for cough, chest tightness, shortness of breath and wheezing.    Cardiovascular:  Positive for leg swelling. Negative for chest pain and palpitations.   Gastrointestinal:  Negative for constipation, diarrhea, nausea and vomiting.   Endocrine: Negative for cold intolerance and heat intolerance.   Genitourinary:  Negative for flank pain.   Musculoskeletal:  Positive for gait problem and myalgias. Negative for neck pain and neck stiffness.   Skin:  Positive for wound. Negative for color change.   Neurological:  Positive for numbness. Negative for weakness, light-headedness and headaches.        + paresthesia    Psychiatric/Behavioral:  Negative for sleep disturbance.        Objective:        Physical Exam  Constitutional:       Appearance: He is well-developed.   Cardiovascular:      Comments: Dorsalis pedis and posterior tibial pulses are palpable Skin is atrophic, slightly hyperpigmented, and mildly edematous      Musculoskeletal:         General: No tenderness. Normal range of motion.      Comments: Muscle strength is 5/5 in all groups bilaterally.    S/p partial 5th ray amp b/l    S/p hallux amp right    Fat pad atrophy to heels and met heads bilateral       Skin:     General: Skin is warm and dry.      Coloration: Skin is not jaundiced, mottled or sallow.      Findings: Wound (see below) present. No abscess or ecchymosis.      Comments:        Neurological:      Mental Status: He is alert and  oriented to person, place, and time.      Comments: [place]-Nenita 5.07 monofilamant testing is diminished Kenji feet. Sharp/dull sensation diminished Bilaterally. Light touch absent Bilaterally.       Psychiatric:         Behavior: Behavior normal.       Vitals:    12/23/22 0936   BP: (!) 141/83   Pulse: 93   Temp: 98.2 °F (36.8 °C)       Assessment:           ICD-10-CM ICD-9-CM   1. Diabetic ulcer of right foot associated with type 2 diabetes mellitus, with fat layer exposed  E11.621 250.80    L97.512 707.15   2. Chronic osteomyelitis of right foot  M86.671 730.17            Wound 04/08/22 1137 Diabetic Ulcer Left plantar;medial Foot (Active)   04/08/22 1137    Pre-existing: Yes   Primary Wound Type: Diabetic ulc   Side: Left   Orientation: plantar;medial   Location: Foot   Wound Number:    Ankle-Brachial Index:    Pulses:    Removal Indication and Assessment:    Wound Outcome:    (Retired) Wound Type:    (Retired) Wound Length (cm):    (Retired) Wound Width (cm):    (Retired) Depth (cm):    Wound Description (Comments):    Removal Indications:    Wound Image    12/23/22 1229   Wound WDL ex 12/23/22 1229   Dressing Appearance Dry;Intact 12/23/22 1229   Drainage Amount Scant 12/23/22 1229   Drainage Characteristics/Odor Serous 12/23/22 1229   Appearance Intact;Red 12/23/22 1229   Tissue loss description Partial thickness 12/23/22 1229   Red (%), Wound Tissue Color 100 % 12/23/22 1229   Periwound Area Intact;Dry;Macerated 12/23/22 1229   Wound Edges Defined 12/23/22 1229   Wound Length (cm) 1 cm 12/23/22 1229   Wound Width (cm) 2 cm 12/23/22 1229   Wound Depth (cm) 0.3 cm 12/23/22 1229   Wound Volume (cm^3) 0.6 cm^3 12/23/22 1229   Wound Surface Area (cm^2) 2 cm^2 12/23/22 1229   Undermining (depth (cm)/location) 12-12 on clock 0. 5 cm 12/23/22 1229   Care Cleansed with:;Antimicrobial agent;Sterile normal saline 12/23/22 1229   Dressing Changed 12/23/22 1229   Off Loading Football dressing;Off loading shoe 12/23/22  1229   Dressing Change Due 12/30/22 12/23/22 1229            Wound 05/06/22 1142 Diabetic Ulcer Right plantar Foot (Active)   05/06/22 1142    Pre-existing: Yes   Primary Wound Type: Diabetic ulc   Side: Right   Orientation: plantar   Location: Foot   Wound Number:    Ankle-Brachial Index:    Pulses:    Removal Indication and Assessment:    Wound Outcome:    (Retired) Wound Type:    (Retired) Wound Length (cm):    (Retired) Wound Width (cm):    (Retired) Depth (cm):    Wound Description (Comments):    Removal Indications:    Wound Image    12/23/22 1229   Wound WDL ex 12/23/22 1229   Dressing Appearance Dry;Intact 12/23/22 1229   Drainage Amount None 12/23/22 1229   Appearance Intact;Red 12/23/22 1229   Tissue loss description Partial thickness 12/23/22 1229   Red (%), Wound Tissue Color 100 % 12/23/22 1229   Periwound Area Intact;Dry 12/23/22 1229   Wound Edges Defined;Callused 12/23/22 1229   Wound Length (cm) 0.5 cm 12/23/22 1229   Wound Width (cm) 0.5 cm 12/23/22 1229   Wound Depth (cm) 0.2 cm 12/23/22 1229   Wound Volume (cm^3) 0.05 cm^3 12/23/22 1229   Wound Surface Area (cm^2) 0.25 cm^2 12/23/22 1229   Care Cleansed with:;Antimicrobial agent;Sterile normal saline 12/23/22 1229   Dressing Changed 12/23/22 1229   Off Loading Football dressing;Off loading shoe 12/23/22 1229   Dressing Change Due 12/30/22 12/23/22 1229           Plan:              NEOX OP REPORT    SURGEON: Marivel Peterson DPM  ASSITANT: None  PRE-OP DX: Ulceration secondary to diabetes mellitus and peripheral neuropathy with sluggish response to prior treatment.  POST-OP DX: same.  ANESTHESIA: Pt. has loss of sensation and therefore no anesthesia was required.  HEMOSTASIS: pressure  EBL: less than 5cc.    Time Out performed to verify patient and site and consent obtained.    Procedure: The patient was seen in the wound clinic room and placed in supine position on the treatment table.  Attention was directed to the ulcer which was located on the  right foot, where an ulceration measuring  0.5x0.5x0.2 cm is noted.  The ulceration was covered with fibrin and a mild callused rim with periwound maceration.  No acute signs of infection were noted.  The wound is nontender.  Utilizing a 3 mm curette, I debrided 100% of the wound full-thickness through subcutaneous tissue to excise viable and nonviable tissue outside the wound margins.  Patient tolerated well.  The wound was flushed with sterile saline.  There was minimal bleeding with debridement which was well controlled with direct pressure.  A 2x3 cm NEOX sheet was then inspected and noted to be in acceptable.  It was then removed sterilely .  The sheet was then fenestrated with the scalpel and then cut and sized and shaped to the wound.  I utilized  1x1 cm with overlapping edges against the wound and discarded the remainder.  The NEOX was adhered down with saline soaked cotton swabs and then secured in place 3-0 prolene and covered with adaptic touch non-adherent layer.  The patient having tolerated the procedure well left the clinic with all vital signs stable and vascular status intact to all digits of both feet.     Short-term goals including promoting granulation and epithelialization of the wound. Long term goals include maintaining a healed wound with appropriate offloading and good medical management per internist.    Offloading:  CAM boot    Tissue pathology and/or culture taken     [] Yes      [x] No  Sharp debridement performed                   [x] Yes       [] No  Labs ordered     [] Yes       [x] No  Imaging ordered    [] Yes      [x] No    Orders Placed This Encounter   Procedures    Change dressing     Neox to both feet with adaptic custom foot pad with Mextra and two long Francisco Javier foams in perpendicular fashion with coban and Tubigrip in medium gradient in size E to both feet        Follow up in about 1 week (around 12/30/2022).

## 2022-12-23 NOTE — PROGRESS NOTES
Arrived awake and alert. ABC's intact. Glucose 198 mg/dl. Pt wasn't able to treat today secondary to timeframe. He had a skin graft placed today by Dr. Peterson.   Pt will follow up Tuesday for HBO.       ICD-10-CM ICD-9-CM   1. Chronic osteomyelitis of right foot  M86.671 730.17   2. Diabetic ulcer of right foot associated with type 2 diabetes mellitus, with fat layer exposed  E11.621 250.80    L97.512 707.15   3. Acute on chronic osteomyelitis  M86.10 730.00    M86.60 730.10   4. Diabetic ulcer of right midfoot associated with type 2 diabetes mellitus, with bone involvement without evidence of necrosis  E11.621 250.80    L97.416 707.14

## 2022-12-27 ENCOUNTER — HOSPITAL ENCOUNTER (OUTPATIENT)
Dept: WOUND CARE | Facility: HOSPITAL | Age: 52
Discharge: HOME | End: 2022-12-27
Attending: FAMILY MEDICINE
Payer: MEDICARE

## 2022-12-27 DIAGNOSIS — L97.512 DIABETIC ULCER OF RIGHT FOOT ASSOCIATED WITH TYPE 2 DIABETES MELLITUS, WITH FAT LAYER EXPOSED: ICD-10-CM

## 2022-12-27 DIAGNOSIS — E11.621 DIABETIC ULCER OF RIGHT MIDFOOT ASSOCIATED WITH TYPE 2 DIABETES MELLITUS, WITH BONE INVOLVEMENT WITHOUT EVIDENCE OF NECROSIS: ICD-10-CM

## 2022-12-27 DIAGNOSIS — M86.671 CHRONIC OSTEOMYELITIS OF RIGHT FOOT: Primary | ICD-10-CM

## 2022-12-27 DIAGNOSIS — M86.60 ACUTE ON CHRONIC OSTEOMYELITIS: ICD-10-CM

## 2022-12-27 DIAGNOSIS — E11.621 DIABETIC ULCER OF RIGHT FOOT ASSOCIATED WITH TYPE 2 DIABETES MELLITUS, WITH FAT LAYER EXPOSED: ICD-10-CM

## 2022-12-27 DIAGNOSIS — M86.10 ACUTE ON CHRONIC OSTEOMYELITIS: ICD-10-CM

## 2022-12-27 DIAGNOSIS — L97.416 DIABETIC ULCER OF RIGHT MIDFOOT ASSOCIATED WITH TYPE 2 DIABETES MELLITUS, WITH BONE INVOLVEMENT WITHOUT EVIDENCE OF NECROSIS: ICD-10-CM

## 2022-12-27 LAB
POCT GLUCOSE: 137 MG/DL (ref 70–110)
POCT GLUCOSE: 144 MG/DL (ref 70–110)

## 2022-12-27 PROCEDURE — G0277 HBOT, FULL BODY CHAMBER, 30M: HCPCS | Performed by: FAMILY MEDICINE

## 2022-12-27 PROCEDURE — 99183 PR HYPERBARIC OXYGEN THERAPY ATTENDANCE/SUPERVISION, PER SESSION: ICD-10-PCS | Mod: ,,, | Performed by: FAMILY MEDICINE

## 2022-12-27 PROCEDURE — 82962 GLUCOSE BLOOD TEST: CPT | Mod: 91 | Performed by: FAMILY MEDICINE

## 2022-12-27 PROCEDURE — 99183 HYPERBARIC OXYGEN THERAPY: CPT | Mod: ,,, | Performed by: FAMILY MEDICINE

## 2022-12-27 NOTE — PROGRESS NOTES
12/27/22 1015   Hyperbaric Pre-Inspection   Mattress cleaned prior to treatment Yes   2 Patient Identifiers Verified Yes   Consent Obtained Yes   Cotton Gown Yes   Patient voided Yes   Drainage Bags Emptied Not applicable   Patient Pregnant Not applicable   Glasses, Jewelry, Makeup, etc. Removed Yes   Dentures, Hearing Aid, Prosthetic Devices Removed Yes   Touch hair to check for hair spray Yes   Cold/Flu Symptoms No   Diabetic Patient Eaten Yes   Medications Given Not applicable   Fears and apprehensions verbalized Yes   Ground Wire Secured Yes   HBO Treatment Course Details   Treatment Course Number 1   Ordering Provider Jennifer SCHILLING   Indications Diabetic wounds of lower extremities   HBO Treatment Start Date 12/19/22   HBO Treatment Details   Treatment Number 6   Inpatient Visit No   Total Treatment Length Calc (minutes) 113   Chamber Type Monoplace   Chamber # 2   HBO Dive Log # 1141   Treatment Protocol 2.0 SURI x 90 minutes w/100% oxygen and no air break   Treatment Details   Dive Rate Down 1.0 psi/minute   Dive Rate Up 2.0 psi/minute   Compress Begins 1 SURI   Clock Time 1041   Tx Pressure Reached 2 SURI   Clock Time 1056   Decompress Begins 2 SURI   Clock Time 1226   Decompress Ends 1 SURI   Clock Time 1234   Pre HBO Vital Signs   /73   Pulse 97   Resp 18   Temp 97.7 °F (36.5 °C)   Blood Glucose 144   Glucose Meter # wc   Pain Level 0   Post HBO Vital Signs   BP (!) 174/87   Pulse 77   Resp 16   Blood Glucose 137   Pain Level 0   Ear Evaluation   Left Teed Scale Pre Grade 0   Left Teed Scale Post Grade 0   Right Teed Scale Pre Grade 0   Right Teed Scale Post Grade 0           Arrived awake and alert. ABC's intact. Ambulated without assistance. Cleared for treatment by Roberto SCHILLING Tolerated treatment without complaints. Cleared from chamber without incidents. Follow up tomorrow for HBO.         ICD-10-CM ICD-9-CM   1. Chronic osteomyelitis of right foot  M86.671 730.17   2. Diabetic ulcer of right foot  associated with type 2 diabetes mellitus, with fat layer exposed  E11.621 250.80    L97.512 707.15   3. Acute on chronic osteomyelitis  M86.10 730.00    M86.60 730.10   4. Diabetic ulcer of right midfoot associated with type 2 diabetes mellitus, with bone involvement without evidence of necrosis  E11.621 250.80    L97.416 707.14          Physician Supervision  I provided direct supervision and was immediately available to furnish assistance and direction throughout the performance of the procedure.    Emergency Response Team  A trained emergency response team and emergency services were available throughout procedure.

## 2022-12-28 ENCOUNTER — HOSPITAL ENCOUNTER (OUTPATIENT)
Dept: WOUND CARE | Facility: HOSPITAL | Age: 52
Discharge: HOME | End: 2022-12-28
Attending: FAMILY MEDICINE
Payer: MEDICARE

## 2022-12-28 VITALS — SYSTOLIC BLOOD PRESSURE: 128 MMHG | HEART RATE: 84 BPM | TEMPERATURE: 98 F | DIASTOLIC BLOOD PRESSURE: 78 MMHG

## 2022-12-28 DIAGNOSIS — E11.621 DIABETIC ULCER OF RIGHT MIDFOOT ASSOCIATED WITH TYPE 2 DIABETES MELLITUS, WITH BONE INVOLVEMENT WITHOUT EVIDENCE OF NECROSIS: ICD-10-CM

## 2022-12-28 DIAGNOSIS — E11.621 DIABETIC ULCER OF LEFT FOOT: ICD-10-CM

## 2022-12-28 DIAGNOSIS — M86.10 ACUTE ON CHRONIC OSTEOMYELITIS: ICD-10-CM

## 2022-12-28 DIAGNOSIS — L97.512 DIABETIC ULCER OF RIGHT FOOT ASSOCIATED WITH TYPE 2 DIABETES MELLITUS, WITH FAT LAYER EXPOSED: ICD-10-CM

## 2022-12-28 DIAGNOSIS — M86.671 CHRONIC OSTEOMYELITIS OF RIGHT FOOT: Primary | ICD-10-CM

## 2022-12-28 DIAGNOSIS — L97.416 DIABETIC ULCER OF RIGHT MIDFOOT ASSOCIATED WITH TYPE 2 DIABETES MELLITUS, WITH BONE INVOLVEMENT WITHOUT EVIDENCE OF NECROSIS: ICD-10-CM

## 2022-12-28 DIAGNOSIS — L97.529 DIABETIC ULCER OF LEFT FOOT: ICD-10-CM

## 2022-12-28 DIAGNOSIS — M86.60 ACUTE ON CHRONIC OSTEOMYELITIS: ICD-10-CM

## 2022-12-28 DIAGNOSIS — E11.621 DIABETIC ULCER OF RIGHT FOOT ASSOCIATED WITH TYPE 2 DIABETES MELLITUS, WITH FAT LAYER EXPOSED: ICD-10-CM

## 2022-12-28 LAB
POCT GLUCOSE: 135 MG/DL (ref 70–110)
POCT GLUCOSE: 158 MG/DL (ref 70–110)

## 2022-12-28 PROCEDURE — 82962 GLUCOSE BLOOD TEST: CPT | Mod: 91 | Performed by: FAMILY MEDICINE

## 2022-12-28 PROCEDURE — 99183 HYPERBARIC OXYGEN THERAPY: CPT | Mod: ,,, | Performed by: FAMILY MEDICINE

## 2022-12-28 PROCEDURE — 99183 PR HYPERBARIC OXYGEN THERAPY ATTENDANCE/SUPERVISION, PER SESSION: ICD-10-PCS | Mod: ,,, | Performed by: FAMILY MEDICINE

## 2022-12-28 PROCEDURE — G0277 HBOT, FULL BODY CHAMBER, 30M: HCPCS | Performed by: FAMILY MEDICINE

## 2022-12-28 PROCEDURE — 99212 OFFICE O/P EST SF 10 MIN: CPT | Mod: 25

## 2022-12-28 NOTE — PROGRESS NOTES
12/28/22 0930   Hyperbaric Pre-Inspection   Mattress cleaned prior to treatment Yes   2 Patient Identifiers Verified Yes   Consent Obtained Yes   Cotton Gown Yes   Patient voided Yes   Drainage Bags Emptied Not applicable   Patient Pregnant Not applicable   Glasses, Jewelry, Makeup, etc. Removed Yes   Dentures, Hearing Aid, Prosthetic Devices Removed Yes   Touch hair to check for hair spray Yes   Cold/Flu Symptoms No   Diabetic Patient Eaten Yes   Medications Given Not applicable   Fears and apprehensions verbalized Yes   Ground Wire Secured Yes   HBO Treatment Course Details   Treatment Course Number 1   Ordering Provider Jennifer SCHILLING   Indications Diabetic wounds of lower extremities   HBO Treatment Start Date 12/19/22   HBO Treatment Details   Treatment Number 6   Total Treatment Length Calc (minutes) 113   Chamber Type Monoplace   Chamber # 2   HBO Dive Log # 1142   Treatment Protocol 2.0 SURI x 90 minutes w/100% oxygen and no air break   Treatment Details   Dive Rate Down 1.0 psi/minute   Dive Rate Up 2.0 psi/minute   Compress Begins 1 SURI   Clock Time 0959   Tx Pressure Reached 2 SURI   Clock Time 1014   Decompress Begins 2 SURI   Clock Time 1144   Decompress Ends 1 SURI   Clock Time 1152   Pre HBO Vital Signs   /76   Pulse 93   Resp 18   Temp 97.7 °F (36.5 °C)   Blood Glucose 158   Glucose Meter # wc   Pain Level 0   Post HBO Vital Signs   BP (!) 175/92   Pulse 77   Resp 16   Blood Glucose 135   Glucose Meter # wc   Pain Level 0         Arrived awake and alert. ABC's intact. Ambulated without assistance. Cleared for treatment by Jennifer SCHILLING Tolerated treatment without complaints. Cleared from chamber without incidents. Follow up tomorrow for HBO.         ICD-10-CM ICD-9-CM   1. Chronic osteomyelitis of right foot  M86.671 730.17   2. Diabetic ulcer of right foot associated with type 2 diabetes mellitus, with fat layer exposed  E11.621 250.80    L97.512 707.15   3. Acute on chronic osteomyelitis  M86.10  730.00    M86.60 730.10   4. Diabetic ulcer of right midfoot associated with type 2 diabetes mellitus, with bone involvement without evidence of necrosis  E11.621 250.80    L97.416 707.14          Physician Supervision  I provided direct supervision and was immediately available to furnish assistance and direction throughout the performance of the procedure.    Emergency Response Team  A trained emergency response team and emergency services were available throughout procedure.

## 2022-12-28 NOTE — PROGRESS NOTES
[facility]  [address]  [address]  Nurse Visit    Subjective:       Patient seen in clinic today.  Dressing changed as ordered. Strike through drainage noted to Left Plantar Foot dressing (to Tubigrip layer).      Assessment:          Wound 04/08/22 1137 Diabetic Ulcer Left plantar;medial Foot (Active)   04/08/22 1137    Pre-existing: Yes   Primary Wound Type: Diabetic ulc   Side: Left   Orientation: plantar;medial   Location: Foot   Wound Number:    Ankle-Brachial Index:    Pulses:    Removal Indication and Assessment:    Wound Outcome:    (Retired) Wound Type:    (Retired) Wound Length (cm):    (Retired) Wound Width (cm):    (Retired) Depth (cm):    Wound Description (Comments):    Removal Indications:    Wound WDL ex 12/28/22 1412   Dressing Appearance Intact;Moist drainage;Area marked 12/28/22 1412   Drainage Amount Large 12/28/22 1412   Drainage Characteristics/Odor Serosanguineous;Malodorous 12/28/22 1412   Appearance Sutures intact;Other (see comments) 12/28/22 1412   Periwound Area Pale white;Macerated 12/28/22 1412   Wound Edges Defined 12/28/22 1412   Care Cleansed with:;Antimicrobial agent;Sterile normal saline 12/28/22 1412   Dressing Changed 12/28/22 1412   Periwound Care Skin barrier film applied 12/28/22 1412   Compression Tubular elasticized bandage 12/28/22 1412   Off Loading Football dressing;Off loading shoe 12/28/22 1412   Dressing Change Due 01/06/23 12/28/22 1412            Wound 05/06/22 1142 Diabetic Ulcer Right plantar Foot (Active)   05/06/22 1142    Pre-existing: Yes   Primary Wound Type: Diabetic ulc   Side: Right   Orientation: plantar   Location: Foot   Wound Number:    Ankle-Brachial Index:    Pulses:    Removal Indication and Assessment:    Wound Outcome:    (Retired) Wound Type:    (Retired) Wound Length (cm):    (Retired) Wound Width (cm):    (Retired) Depth (cm):    Wound Description (Comments):    Removal Indications:    Wound WDL ex  12/28/22 1412   Dressing Appearance Intact;Dry 12/28/22 1412   Drainage Amount None 12/28/22 1412   Appearance Other (see comments);Sutures intact 12/28/22 1412   Care Cleansed with:;Antimicrobial agent;Sterile normal saline 12/28/22 1412   Dressing Changed 12/28/22 1412   Periwound Care Skin barrier film applied 12/28/22 1412   Compression Tubular elasticized bandage 12/28/22 1412   Off Loading Football dressing;Off loading shoe 12/28/22 1412   Dressing Change Due 12/30/22 12/28/22 1412           Plan:     No orders of the defined types were placed in this encounter.          Follow up in about 2 days (around 12/30/2022) for wound care.

## 2022-12-29 ENCOUNTER — HOSPITAL ENCOUNTER (OUTPATIENT)
Dept: WOUND CARE | Facility: HOSPITAL | Age: 52
Discharge: HOME | End: 2022-12-29
Attending: FAMILY MEDICINE
Payer: MEDICARE

## 2022-12-29 DIAGNOSIS — E11.621 DIABETIC ULCER OF RIGHT FOOT ASSOCIATED WITH TYPE 2 DIABETES MELLITUS, WITH FAT LAYER EXPOSED: ICD-10-CM

## 2022-12-29 DIAGNOSIS — L97.512 DIABETIC ULCER OF RIGHT FOOT ASSOCIATED WITH TYPE 2 DIABETES MELLITUS, WITH FAT LAYER EXPOSED: ICD-10-CM

## 2022-12-29 DIAGNOSIS — L97.416 DIABETIC ULCER OF RIGHT MIDFOOT ASSOCIATED WITH TYPE 2 DIABETES MELLITUS, WITH BONE INVOLVEMENT WITHOUT EVIDENCE OF NECROSIS: ICD-10-CM

## 2022-12-29 DIAGNOSIS — M86.10 ACUTE ON CHRONIC OSTEOMYELITIS: ICD-10-CM

## 2022-12-29 DIAGNOSIS — M86.671 CHRONIC OSTEOMYELITIS OF RIGHT FOOT: Primary | ICD-10-CM

## 2022-12-29 DIAGNOSIS — M86.60 ACUTE ON CHRONIC OSTEOMYELITIS: ICD-10-CM

## 2022-12-29 DIAGNOSIS — E11.621 DIABETIC ULCER OF RIGHT MIDFOOT ASSOCIATED WITH TYPE 2 DIABETES MELLITUS, WITH BONE INVOLVEMENT WITHOUT EVIDENCE OF NECROSIS: ICD-10-CM

## 2022-12-29 LAB
POCT GLUCOSE: 264 MG/DL (ref 70–110)
POCT GLUCOSE: 295 MG/DL (ref 70–110)

## 2022-12-29 PROCEDURE — 82962 GLUCOSE BLOOD TEST: CPT | Performed by: FAMILY MEDICINE

## 2022-12-29 PROCEDURE — G0277 HBOT, FULL BODY CHAMBER, 30M: HCPCS | Performed by: FAMILY MEDICINE

## 2022-12-29 PROCEDURE — 99183 HYPERBARIC OXYGEN THERAPY: CPT | Mod: ,,, | Performed by: FAMILY MEDICINE

## 2022-12-29 PROCEDURE — 99183 PR HYPERBARIC OXYGEN THERAPY ATTENDANCE/SUPERVISION, PER SESSION: ICD-10-PCS | Mod: ,,, | Performed by: FAMILY MEDICINE

## 2022-12-29 NOTE — PROGRESS NOTES
I have reviewed the notes, assessments, and/or procedures performed by HBO tech., I agree with finds.  I have seen and evaluated the patient    Paula Concepcion.     12/29/22 1000   Hyperbaric Pre-Inspection   Mattress cleaned prior to treatment Yes   2 Patient Identifiers Verified Yes   Consent Obtained Yes   Cotton Gown Yes   Patient voided Yes   Drainage Bags Emptied Not applicable   Patient Pregnant Not applicable   Glasses, Jewelry, Makeup, etc. Removed Yes   Dentures, Hearing Aid, Prosthetic Devices Removed Yes   Touch hair to check for hair spray Yes   Cold/Flu Symptoms No   Diabetic Patient Eaten Yes   Medications Given Not applicable   Fears and apprehensions verbalized Yes   Ground Wire Secured Yes   HBO Treatment Course Details   Treatment Course Number 1   Ordering Provider Jennifer SCHILLING   Indications Diabetic wounds of lower extremities   HBO Treatment Start Date 12/19/22   HBO Treatment Details   Treatment Number 7   Inpatient Visit No   Total Treatment Length Calc (minutes) 108   Chamber Type Monoplace   Chamber # 2   HBO Dive Log # 1144   Treatment Protocol 2.0 SURI x 90 minutes w/100% oxygen and no air break   Treatment Details   Dive Rate Down 1.5 psi/minute   Dive Rate Up 2.0 psi/minute   Compress Begins 1 SURI   Clock Time 1020   Tx Pressure Reached 2 SURI   Clock Time 1030   Decompress Begins 2 SURI   Clock Time 1200   Decompress Ends 1 SURI   Clock Time 1208   Pre HBO Vital Signs   BP (!) 149/79   Pulse 89   Resp 18   Temp 97.7 °F (36.5 °C)   Blood Glucose 295   Glucose Meter # wc   Pain Level 0   Post HBO Vital Signs   BP (!) 136/99   Pulse 83   Resp 16   Blood Glucose 264   Glucose Meter # wc   Pain Level 0   Ear Evaluation   Left Teed Scale Pre Grade 0   Left Teed Scale Post Grade I   Right Teed Scale Pre Grade 0   Right Teed Scale Post Grade 0         Arrived awake and alert. ABC's intact. Ambulated without assistance. Cleared for treatment by Kira SCHILLING Tolerated treatment without complaints.  Cleared from chamber without incidents. Follow up tomorrow for HBO.         ICD-10-CM ICD-9-CM   1. Chronic osteomyelitis of right foot  M86.671 730.17   2. Diabetic ulcer of right foot associated with type 2 diabetes mellitus, with fat layer exposed  E11.621 250.80    L97.512 707.15   3. Acute on chronic osteomyelitis  M86.10 730.00    M86.60 730.10   4. Diabetic ulcer of right midfoot associated with type 2 diabetes mellitus, with bone involvement without evidence of necrosis  E11.621 250.80    L97.416 707.14        Physician Supervision  I provided direct supervision and was immediately available to furnish assistance and direction throughout the performance of the procedure.    Emergency Response Team  A trained emergency response team and emergency services were available throughout procedure.

## 2022-12-30 ENCOUNTER — HOSPITAL ENCOUNTER (OUTPATIENT)
Dept: WOUND CARE | Facility: HOSPITAL | Age: 52
Discharge: HOME | End: 2022-12-30
Attending: FAMILY MEDICINE
Payer: MEDICARE

## 2022-12-30 VITALS — TEMPERATURE: 98 F | SYSTOLIC BLOOD PRESSURE: 154 MMHG | HEART RATE: 84 BPM | DIASTOLIC BLOOD PRESSURE: 86 MMHG

## 2022-12-30 DIAGNOSIS — L97.416 DIABETIC ULCER OF RIGHT MIDFOOT ASSOCIATED WITH TYPE 2 DIABETES MELLITUS, WITH BONE INVOLVEMENT WITHOUT EVIDENCE OF NECROSIS: ICD-10-CM

## 2022-12-30 DIAGNOSIS — M86.10 ACUTE ON CHRONIC OSTEOMYELITIS: ICD-10-CM

## 2022-12-30 DIAGNOSIS — E11.621 DIABETIC ULCER OF RIGHT FOOT ASSOCIATED WITH TYPE 2 DIABETES MELLITUS, WITH FAT LAYER EXPOSED: ICD-10-CM

## 2022-12-30 DIAGNOSIS — L97.512 DIABETIC ULCER OF RIGHT FOOT ASSOCIATED WITH TYPE 2 DIABETES MELLITUS, WITH FAT LAYER EXPOSED: ICD-10-CM

## 2022-12-30 DIAGNOSIS — E11.621 DIABETIC ULCER OF RIGHT MIDFOOT ASSOCIATED WITH TYPE 2 DIABETES MELLITUS, WITH BONE INVOLVEMENT WITHOUT EVIDENCE OF NECROSIS: ICD-10-CM

## 2022-12-30 DIAGNOSIS — E11.621 DIABETIC ULCER OF LEFT FOOT: ICD-10-CM

## 2022-12-30 DIAGNOSIS — L97.529 DIABETIC ULCER OF LEFT FOOT: ICD-10-CM

## 2022-12-30 DIAGNOSIS — M86.671 CHRONIC OSTEOMYELITIS OF RIGHT FOOT: Primary | ICD-10-CM

## 2022-12-30 DIAGNOSIS — M86.60 ACUTE ON CHRONIC OSTEOMYELITIS: ICD-10-CM

## 2022-12-30 LAB
POCT GLUCOSE: 166 MG/DL (ref 70–110)
POCT GLUCOSE: 232 MG/DL (ref 70–110)

## 2022-12-30 PROCEDURE — 99183 PR HYPERBARIC OXYGEN THERAPY ATTENDANCE/SUPERVISION, PER SESSION: ICD-10-PCS | Mod: ,,, | Performed by: FAMILY MEDICINE

## 2022-12-30 PROCEDURE — 99183 HYPERBARIC OXYGEN THERAPY: CPT | Mod: ,,, | Performed by: FAMILY MEDICINE

## 2022-12-30 PROCEDURE — 99214 OFFICE O/P EST MOD 30 MIN: CPT | Mod: 25 | Performed by: FAMILY MEDICINE

## 2022-12-30 PROCEDURE — 82962 GLUCOSE BLOOD TEST: CPT | Mod: 91 | Performed by: FAMILY MEDICINE

## 2022-12-30 PROCEDURE — 99214 OFFICE O/P EST MOD 30 MIN: CPT | Mod: ,,, | Performed by: FAMILY MEDICINE

## 2022-12-30 PROCEDURE — G0277 HBOT, FULL BODY CHAMBER, 30M: HCPCS | Performed by: FAMILY MEDICINE

## 2022-12-30 PROCEDURE — 99214 PR OFFICE/OUTPT VISIT, EST, LEVL IV, 30-39 MIN: ICD-10-PCS | Mod: ,,, | Performed by: FAMILY MEDICINE

## 2022-12-30 NOTE — PROGRESS NOTES
[facility]                Progress Note    Subjective:       Patient ID: Indio Ryder Jr. is a 52 y.o. male.    Chief Complaint: Wound Check    Follow up wound care visit. Patient ambulated to exam room without assistance, prescribed Darco shoe on Right Foot and CAM boot on LLE. Patient denies pain or discomfort at present. Denies fever, chills or flu-like symptoms. Dressing's intact with a moderate amount of serosanguineous, non-malodor drainage from Left Foot wound and no drainage from Right Foot wound. Right Foot wound no measurement taken - skin sub still in place with stitch. Left Foot has sutures in place and skin sub, each wound bed measured separately: Medial 0.7 cm x 0.3 cm x 0.1 cm and Lateral 0.4 cm x 0.5 cm x 0.1 cm.     Wound care done as per order. Return to clinic in 1 week.      Review of Systems   Constitutional: Negative.    HENT: Negative.     Eyes: Negative.    Respiratory: Negative.     Cardiovascular: Negative.    Musculoskeletal: Negative.    Skin:  Positive for wound.   Neurological: Negative.    Hematological: Negative.    All other systems reviewed and are negative.      Objective:        Physical Exam  Vitals reviewed.   Constitutional:       Appearance: Normal appearance. He is well-developed.   HENT:      Head: Normocephalic and atraumatic.   Eyes:      Extraocular Movements: Extraocular movements intact.      Conjunctiva/sclera: Conjunctivae normal.      Pupils: Pupils are equal, round, and reactive to light.   Musculoskeletal:      Cervical back: Normal range of motion.   Skin:     General: Skin is warm and dry.      Comments: Please see wound description   Neurological:      Mental Status: He is alert and oriented to person, place, and time.   Psychiatric:         Behavior: Behavior normal.       Vitals:    12/30/22 0941   BP: (!) 154/86   Pulse: 84   Temp: 97.8 °F (36.6 °C)       Assessment:           ICD-10-CM ICD-9-CM   1. Chronic  osteomyelitis of right foot  M86.671 730.17   2. Diabetic ulcer of right foot associated with type 2 diabetes mellitus, with fat layer exposed  E11.621 250.80    L97.512 707.15   3. Diabetic ulcer of left foot  E11.621 250.80    L97.529 707.15            Wound 04/08/22 1137 Diabetic Ulcer Left plantar;medial Foot (Active)   04/08/22 1137    Pre-existing: Yes   Primary Wound Type: Diabetic ulc   Side: Left   Orientation: plantar;medial   Location: Foot   Wound Number:    Ankle-Brachial Index:    Pulses:    Removal Indication and Assessment:    Wound Outcome:    (Retired) Wound Type:    (Retired) Wound Length (cm):    (Retired) Wound Width (cm):    (Retired) Depth (cm):    Wound Description (Comments):    Removal Indications:    Wound Image   12/30/22 0954   Wound WDL ex 12/30/22 0954   Dressing Appearance Intact;Moist drainage 12/30/22 0954   Drainage Amount Moderate 12/30/22 0954   Drainage Characteristics/Odor Serosanguineous;No odor 12/30/22 0954   Appearance Sutures intact;Other (see comments) 12/30/22 0954   Tissue loss description Partial thickness 12/30/22 0954   Black (%), Wound Tissue Color 0 % 12/30/22 0954   Red (%), Wound Tissue Color 100 % 12/30/22 0954   Yellow (%), Wound Tissue Color 0 % 12/30/22 0954   Periwound Area Pale white 12/30/22 0954   Wound Edges Defined 12/30/22 0954   Wound Length (cm) 1 cm 12/30/22 0954   Wound Width (cm) 1.2 cm 12/30/22 0954   Wound Depth (cm) 0.1 cm 12/30/22 0954   Wound Volume (cm^3) 0.12 cm^3 12/30/22 0954   Wound Surface Area (cm^2) 1.2 cm^2 12/30/22 0954   Tunneling (depth (cm)/location) 0 12/30/22 0954   Undermining (depth (cm)/location) 0 12/30/22 0954   Care Cleansed with:;Antimicrobial agent;Sterile normal saline;Applied:;Steri-strips 12/30/22 0954   Dressing Changed 12/30/22 0954   Periwound Care Skin barrier film applied 12/30/22 0954   Off Loading Football dressing;Off loading shoe 12/30/22 0954   Dressing Change Due 01/06/23 12/30/22 0954            Wound  05/06/22 1142 Diabetic Ulcer Right plantar Foot (Active)   05/06/22 1142    Pre-existing: Yes   Primary Wound Type: Diabetic ulc   Side: Right   Orientation: plantar   Location: Foot   Wound Number:    Ankle-Brachial Index:    Pulses:    Removal Indication and Assessment:    Wound Outcome:    (Retired) Wound Type:    (Retired) Wound Length (cm):    (Retired) Wound Width (cm):    (Retired) Depth (cm):    Wound Description (Comments):    Removal Indications:    Wound Image   12/30/22 0954   Wound WDL ex 12/30/22 0954   Dressing Appearance Intact;Dry 12/30/22 0954   Drainage Amount None 12/30/22 0954   Appearance Sutures intact;Other (see comments) 12/30/22 0954   Periwound Area Dry 12/30/22 0954   Wound Edges Approximated 12/30/22 0954   Wound Length (cm) 0 cm 12/30/22 0954   Wound Width (cm) 0 cm 12/30/22 0954   Wound Depth (cm) 0 cm 12/30/22 0954   Wound Volume (cm^3) 0 cm^3 12/30/22 0954   Wound Surface Area (cm^2) 0 cm^2 12/30/22 0954   Tunneling (depth (cm)/location) 0 12/30/22 0954   Undermining (depth (cm)/location) 0 12/30/22 0954   Care Cleansed with:;Antimicrobial agent;Sterile normal saline;Applied:;Skin Barrier;Steri-strips 12/30/22 0954   Dressing Changed 12/30/22 0954   Compression Tubular elasticized bandage 12/30/22 0954   Off Loading Football dressing;Off loading shoe 12/30/22 0954   Dressing Change Due 01/06/23 12/30/22 0954           Plan:          Debridement not needed and Not Done today.   Continue off-loading / leg elevation   Continue eating protein   Keep dressing clean and intact  Continue with wound care orders and plan as noted in orders.   Continue to follow current medication regimen as per pcp   Call for any questions / concerns.       Tissue pathology and/or culture taken     [] Yes      [x] No  Sharp debridement performed                   [] Yes       [x] No  Labs ordered     [] Yes       [x] No  Imaging ordered    [] Yes      [x] No    Orders Placed This Encounter   Procedures     "Change dressing     Clean with NS. Neox and stitches still in place. Cavilon/Benzoin applied. Adaptic touch secured with 1/2" steri-strips. Then custom Felt foot pad with hole cut to allow for drainage. Mextra short (1 per foot), Abram foam long x 2 laid perpendicular. Football dressing (cast padding x 3) and Tubigrip single layer, medium gradient in size E to BLE. Darco to Right Foot and CAM boot to LLE.        Follow up in about 1 week (around 1/6/2023) for wound care.         "

## 2022-12-30 NOTE — PROGRESS NOTES
12/30/22 1000   Hyperbaric Pre-Inspection   Mattress cleaned prior to treatment Yes   2 Patient Identifiers Verified Yes   Consent Obtained Yes   Cotton Gown Yes   Patient voided Yes   Drainage Bags Emptied Not applicable   Patient Pregnant Not applicable   Glasses, Jewelry, Makeup, etc. Removed Yes   Dentures, Hearing Aid, Prosthetic Devices Removed Yes   Touch hair to check for hair spray Yes   Cold/Flu Symptoms No   Diabetic Patient Eaten Yes   Medications Given Not applicable   Fears and apprehensions verbalized Yes   Ground Wire Secured Yes   HBO Treatment Course Details   Treatment Course Number 1   Ordering Provider Jennifer SCHILLING   Indications Diabetic wounds of lower extremities   HBO Treatment Start Date 12/19/22   HBO Treatment Details   Treatment Number 2   Inpatient Visit Yes   Total Treatment Length Calc (minutes) 108   Chamber Type Monoplace   Chamber # 2   HBO Dive Log # 1146   Treatment Protocol 2.0 SURI x 90 minutes w/100% oxygen and no air break   Treatment Details   Dive Rate Down 1.5 psi/minute   Dive Rate Up 2.0 psi/minute   Compress Begins 1 SURI   Clock Time 1040   Tx Pressure Reached 2 SURI   Clock Time 1050   Decompress Begins 2 SURI   Clock Time 1220   Decompress Ends 1 SURI   Clock Time 1228   Pre HBO Vital Signs   BP (!) 160/80   Pulse 84   Resp 18   Temp 97.7 °F (36.5 °C)   Blood Glucose 232   Glucose Meter # wc   Pain Level 0   Post HBO Vital Signs   BP (!) 148/75   Pulse 86   Resp 16   Blood Glucose 166   Glucose Meter # wc   Pain Level 0           Arrived awake and alert. ABC's intact. Ambulated without assistance. Cleared for treatment by Ben.RADHA. Tolerated treatment without complaints. Cleared from chamber without incidents. Follow up Tuesday for HBO.             ICD-10-CM ICD-9-CM   1. Chronic osteomyelitis of right foot  M86.671 730.17   2. Diabetic ulcer of right foot associated with type 2 diabetes mellitus, with fat layer exposed  E11.621 250.80    L97.512 707.15   3. Acute on  chronic osteomyelitis  M86.10 730.00    M86.60 730.10   4. Diabetic ulcer of right midfoot associated with type 2 diabetes mellitus, with bone involvement without evidence of necrosis  E11.621 250.80    L97.416 707.14            Physician Supervision  I provided direct supervision and was immediately available to furnish assistance and direction throughout the performance of the procedure.    Emergency Response Team  A trained emergency response team and emergency services were available throughout procedure.

## 2023-01-03 ENCOUNTER — HOSPITAL ENCOUNTER (OUTPATIENT)
Dept: WOUND CARE | Facility: HOSPITAL | Age: 53
Discharge: HOME | End: 2023-01-03
Attending: FAMILY MEDICINE
Payer: MEDICARE

## 2023-01-03 DIAGNOSIS — L97.416 DIABETIC ULCER OF RIGHT MIDFOOT ASSOCIATED WITH TYPE 2 DIABETES MELLITUS, WITH BONE INVOLVEMENT WITHOUT EVIDENCE OF NECROSIS: ICD-10-CM

## 2023-01-03 DIAGNOSIS — M86.60 ACUTE ON CHRONIC OSTEOMYELITIS: ICD-10-CM

## 2023-01-03 DIAGNOSIS — L97.512 DIABETIC ULCER OF RIGHT FOOT ASSOCIATED WITH TYPE 2 DIABETES MELLITUS, WITH FAT LAYER EXPOSED: ICD-10-CM

## 2023-01-03 DIAGNOSIS — M86.671 CHRONIC OSTEOMYELITIS OF RIGHT FOOT: Primary | ICD-10-CM

## 2023-01-03 DIAGNOSIS — M86.10 ACUTE ON CHRONIC OSTEOMYELITIS: ICD-10-CM

## 2023-01-03 DIAGNOSIS — E11.621 DIABETIC ULCER OF RIGHT FOOT ASSOCIATED WITH TYPE 2 DIABETES MELLITUS, WITH FAT LAYER EXPOSED: ICD-10-CM

## 2023-01-03 DIAGNOSIS — E11.621 DIABETIC ULCER OF RIGHT MIDFOOT ASSOCIATED WITH TYPE 2 DIABETES MELLITUS, WITH BONE INVOLVEMENT WITHOUT EVIDENCE OF NECROSIS: ICD-10-CM

## 2023-01-03 LAB
POCT GLUCOSE: 196 MG/DL (ref 70–110)
POCT GLUCOSE: 225 MG/DL (ref 70–110)

## 2023-01-03 PROCEDURE — 82962 GLUCOSE BLOOD TEST: CPT | Performed by: FAMILY MEDICINE

## 2023-01-03 PROCEDURE — 99183 PR HYPERBARIC OXYGEN THERAPY ATTENDANCE/SUPERVISION, PER SESSION: ICD-10-PCS | Mod: ,,, | Performed by: FAMILY MEDICINE

## 2023-01-03 PROCEDURE — 99183 HYPERBARIC OXYGEN THERAPY: CPT | Mod: ,,, | Performed by: FAMILY MEDICINE

## 2023-01-03 PROCEDURE — G0277 HBOT, FULL BODY CHAMBER, 30M: HCPCS | Performed by: FAMILY MEDICINE

## 2023-01-03 NOTE — PROGRESS NOTES
01/03/23 1000   Hyperbaric Pre-Inspection   Mattress cleaned prior to treatment Yes   2 Patient Identifiers Verified Yes   Consent Obtained Yes   Cotton Gown Yes   Patient voided Yes   Drainage Bags Emptied Not applicable   Patient Pregnant Not applicable   Glasses, Jewelry, Makeup, etc. Removed Yes   Dentures, Hearing Aid, Prosthetic Devices Removed Yes   Touch hair to check for hair spray Yes   Cold/Flu Symptoms No   Diabetic Patient Eaten Yes   Medications Given Not applicable   Fears and apprehensions verbalized Yes   Ground Wire Secured Yes   HBO Treatment Course Details   Treatment Course Number 1   Ordering Provider Jennifer SCHILLING   Indications Diabetic wounds of lower extremities   HBO Treatment Start Date 12/19/23   HBO Treatment Details   Treatment Number 9   Total Treatment Length Calc (minutes) 106   Chamber Type Monoplace   Chamber # 2   HBO Dive Log # 1147   Treatment Protocol 2.0 SURI x 90 minutes w/100% oxygen and no air break   Treatment Details   Dive Rate Down 1.5 psi/minute   Dive Rate Up 2.0 psi/minute   Compress Begins 1 SURI   Clock Time 1019   Tx Pressure Reached 2 SURI   Clock Time 1027   Decompress Begins 2 SURI   Clock Time 1157   Decompress Ends 1 SURI   Clock Time 1205   Pre HBO Vital Signs   /74   Pulse 88   Resp 18   Temp 98.2 °F (36.8 °C)   Blood Glucose 225   Glucose Meter # wc   Pain Level 0   Post HBO Vital Signs   BP (!) 144/76   Pulse 88   Resp 16   Blood Glucose 196   Glucose Meter # wc   Pain Level 0   Ear Evaluation   Left Teed Scale Pre Grade 0   Left Teed Scale Post Grade 0   Right Teed Scale Pre Grade 0   Right Teed Scale Post Grade 0       Arrived awake and alert. ABC's intact. Ambulated without assistance. Cleared for treatment by Roberto SCHILLING Tolerated treatment without complaints. Cleared from chamber without incidents. Follow up tomorrow for HBO.         ICD-10-CM ICD-9-CM   1. Chronic osteomyelitis of right foot  M86.671 730.17   2. Diabetic ulcer of right foot  associated with type 2 diabetes mellitus, with fat layer exposed  E11.621 250.80    L97.512 707.15   3. Acute on chronic osteomyelitis  M86.10 730.00    M86.60 730.10   4. Diabetic ulcer of right midfoot associated with type 2 diabetes mellitus, with bone involvement without evidence of necrosis  E11.621 250.80    L97.416 707.14            Physician Supervision  I provided direct supervision and was immediately available to furnish assistance and direction throughout the performance of the procedure.    Emergency Response Team  A trained emergency response team and emergency services were available throughout procedure.

## 2023-01-04 ENCOUNTER — HOSPITAL ENCOUNTER (OUTPATIENT)
Dept: WOUND CARE | Facility: HOSPITAL | Age: 53
Discharge: HOME | End: 2023-01-04
Attending: FAMILY MEDICINE
Payer: MEDICARE

## 2023-01-04 DIAGNOSIS — M86.671 CHRONIC OSTEOMYELITIS OF RIGHT FOOT: Primary | ICD-10-CM

## 2023-01-04 DIAGNOSIS — L97.416 DIABETIC ULCER OF RIGHT MIDFOOT ASSOCIATED WITH TYPE 2 DIABETES MELLITUS, WITH BONE INVOLVEMENT WITHOUT EVIDENCE OF NECROSIS: ICD-10-CM

## 2023-01-04 DIAGNOSIS — M86.10 ACUTE ON CHRONIC OSTEOMYELITIS: ICD-10-CM

## 2023-01-04 DIAGNOSIS — M86.60 ACUTE ON CHRONIC OSTEOMYELITIS: ICD-10-CM

## 2023-01-04 DIAGNOSIS — E11.621 DIABETIC ULCER OF RIGHT FOOT ASSOCIATED WITH TYPE 2 DIABETES MELLITUS, WITH FAT LAYER EXPOSED: ICD-10-CM

## 2023-01-04 DIAGNOSIS — E11.621 DIABETIC ULCER OF RIGHT MIDFOOT ASSOCIATED WITH TYPE 2 DIABETES MELLITUS, WITH BONE INVOLVEMENT WITHOUT EVIDENCE OF NECROSIS: ICD-10-CM

## 2023-01-04 DIAGNOSIS — L97.512 DIABETIC ULCER OF RIGHT FOOT ASSOCIATED WITH TYPE 2 DIABETES MELLITUS, WITH FAT LAYER EXPOSED: ICD-10-CM

## 2023-01-04 LAB — POCT GLUCOSE: 172 MG/DL (ref 70–110)

## 2023-01-04 PROCEDURE — 99183 HYPERBARIC OXYGEN THERAPY: CPT | Mod: ,,, | Performed by: FAMILY MEDICINE

## 2023-01-04 PROCEDURE — 99183 PR HYPERBARIC OXYGEN THERAPY ATTENDANCE/SUPERVISION, PER SESSION: ICD-10-PCS | Mod: ,,, | Performed by: FAMILY MEDICINE

## 2023-01-04 PROCEDURE — G0277 HBOT, FULL BODY CHAMBER, 30M: HCPCS | Performed by: FAMILY MEDICINE

## 2023-01-04 PROCEDURE — 82962 GLUCOSE BLOOD TEST: CPT | Mod: 91 | Performed by: FAMILY MEDICINE

## 2023-01-04 NOTE — PROGRESS NOTES
01/04/23 1000   Hyperbaric Pre-Inspection   Mattress cleaned prior to treatment Yes   2 Patient Identifiers Verified Yes   Consent Obtained Yes   Cotton Gown Yes   Patient voided Yes   Drainage Bags Emptied Yes   Patient Pregnant Not applicable   Glasses, Jewelry, Makeup, etc. Removed Yes   Dentures, Hearing Aid, Prosthetic Devices Removed Yes   Touch hair to check for hair spray Yes   Cold/Flu Symptoms No   Diabetic Patient Eaten Not applicable   Medications Given Not applicable   Fears and apprehensions verbalized Yes   Ground Wire Secured Yes   HBO Treatment Course Details   Treatment Course Number 1   Ordering Provider Jennifer SCHILLING   Indications Diabetic wounds of lower extremities   HBO Treatment Start Date 12/19/23   HBO Treatment Details   Treatment Number 10   Inpatient Visit No   Total Treatment Length Calc (minutes) 108   Chamber Type Monoplace   Chamber # 2   HBO Dive Log # 1149   Treatment Protocol 2.0 SURI x 90 minutes w/100% oxygen and no air break   Treatment Details   Dive Rate Down 1.5 psi/minute   Dive Rate Up 2.0 psi/minute   Compress Begins 1 SURI   Clock Time 1013   Tx Pressure Reached 2 SURI   Clock Time 1023   Decompress Begins 2 SURI   Clock Time 1153   Decompress Ends 1 SURI   Clock Time 1201   Pre HBO Vital Signs   /71   Pulse 97   Resp 16   Temp 98.1 °F (36.7 °C)   Blood Glucose 174   Glucose Meter # wc   Pain Level 0   Post HBO Vital Signs   BP (!) 143/95   Pulse 80   Resp 16   Blood Glucose 143   Glucose Meter # wc   Pain Level 0           Arrived awake and alert. ABC's intact. Ambulated without assistance. Cleared for treatment by Jennifer SCHILLING Tolerated treatment without complaints. Cleared from chamber without incidents. Follow up tomorrow for HBO.         ICD-10-CM ICD-9-CM   1. Chronic osteomyelitis of right foot  M86.671 730.17   2. Diabetic ulcer of right foot associated with type 2 diabetes mellitus, with fat layer exposed  E11.621 250.80    L97.512 707.15   3. Acute on chronic  osteomyelitis  M86.10 730.00    M86.60 730.10   4. Diabetic ulcer of right midfoot associated with type 2 diabetes mellitus, with bone involvement without evidence of necrosis  E11.621 250.80    L97.416 707.14          Physician Supervision  I provided direct supervision and was immediately available to furnish assistance and direction throughout the performance of the procedure.    Emergency Response Team  A trained emergency response team and emergency services were available throughout procedure.

## 2023-01-05 ENCOUNTER — HOSPITAL ENCOUNTER (OUTPATIENT)
Dept: WOUND CARE | Facility: HOSPITAL | Age: 53
Discharge: HOME | End: 2023-01-05
Attending: FAMILY MEDICINE
Payer: MEDICARE

## 2023-01-05 DIAGNOSIS — E11.621 DIABETIC ULCER OF RIGHT FOOT ASSOCIATED WITH TYPE 2 DIABETES MELLITUS, WITH FAT LAYER EXPOSED: ICD-10-CM

## 2023-01-05 DIAGNOSIS — M86.671 CHRONIC OSTEOMYELITIS OF RIGHT FOOT: Primary | ICD-10-CM

## 2023-01-05 DIAGNOSIS — M86.10 ACUTE ON CHRONIC OSTEOMYELITIS: ICD-10-CM

## 2023-01-05 DIAGNOSIS — L97.416 DIABETIC ULCER OF RIGHT MIDFOOT ASSOCIATED WITH TYPE 2 DIABETES MELLITUS, WITH BONE INVOLVEMENT WITHOUT EVIDENCE OF NECROSIS: ICD-10-CM

## 2023-01-05 DIAGNOSIS — M86.60 ACUTE ON CHRONIC OSTEOMYELITIS: ICD-10-CM

## 2023-01-05 DIAGNOSIS — L97.512 DIABETIC ULCER OF RIGHT FOOT ASSOCIATED WITH TYPE 2 DIABETES MELLITUS, WITH FAT LAYER EXPOSED: ICD-10-CM

## 2023-01-05 DIAGNOSIS — E11.621 DIABETIC ULCER OF RIGHT MIDFOOT ASSOCIATED WITH TYPE 2 DIABETES MELLITUS, WITH BONE INVOLVEMENT WITHOUT EVIDENCE OF NECROSIS: ICD-10-CM

## 2023-01-05 LAB
POCT GLUCOSE: 143 MG/DL (ref 70–110)
POCT GLUCOSE: 173 MG/DL (ref 70–110)
POCT GLUCOSE: 187 MG/DL (ref 70–110)

## 2023-01-05 PROCEDURE — 99183 HYPERBARIC OXYGEN THERAPY: CPT | Mod: ,,, | Performed by: FAMILY MEDICINE

## 2023-01-05 PROCEDURE — 99183 PR HYPERBARIC OXYGEN THERAPY ATTENDANCE/SUPERVISION, PER SESSION: ICD-10-PCS | Mod: ,,, | Performed by: FAMILY MEDICINE

## 2023-01-05 NOTE — PROGRESS NOTES
01/05/23 1000   Hyperbaric Pre-Inspection   Mattress cleaned prior to treatment Yes   2 Patient Identifiers Verified Yes   Consent Obtained Yes   Cotton Gown Yes   Patient voided Yes   Drainage Bags Emptied Not applicable   Patient Pregnant Not applicable   Glasses, Jewelry, Makeup, etc. Removed Yes   Dentures, Hearing Aid, Prosthetic Devices Removed Yes   Touch hair to check for hair spray Yes   Cold/Flu Symptoms No   Diabetic Patient Eaten Not applicable   Medications Given Not applicable   Fears and apprehensions verbalized Yes   Ground Wire Secured Yes   HBO Treatment Course Details   Treatment Course Number 1   Ordering Provider Jennifer SCHILLING   Indications Diabetic wounds of lower extremities   HBO Treatment Start Date 12/19/23   HBO Treatment Details   Treatment Number 11   Total Treatment Length Calc (minutes) 108   Chamber Type Monoplace   Chamber # 2   HBO Dive Log # 1151   Treatment Protocol 2.0 SURI x 90 minutes w/100% oxygen and no air break   Treatment Details   Dive Rate Down 1.5 psi/minute   Dive Rate Up 2.0 psi/minute   Compress Begins 1 SURI   Clock Time 1021   Tx Pressure Reached 2 SURI   Clock Time 1031   Decompress Begins 2 SURI   Clock Time 1201   Decompress Ends 1 SURI   Clock Time 1209   Pre HBO Vital Signs   /66   Pulse 91   Resp 16   Temp 97.7 °F (36.5 °C)   Blood Glucose 187   Glucose Meter # wc   Pain Level 0   Post HBO Vital Signs   BP (!) 172/90   Pulse 77   Resp 16   Blood Glucose 173   Glucose Meter # wc   Pain Level 0         Arrived awake and alert. ABC's intact. Ambulated without assistance. Cleared for treatment by Kira SCHILLING Tolerated treatment without complaints. Cleared from chamber without incidents. Follow up tomorrow for HBO.         ICD-10-CM ICD-9-CM   1. Chronic osteomyelitis of right foot  M86.671 730.17   2. Diabetic ulcer of right foot associated with type 2 diabetes mellitus, with fat layer exposed  E11.621 250.80    L97.512 707.15   3. Acute on chronic  osteomyelitis  M86.10 730.00    M86.60 730.10   4. Diabetic ulcer of right midfoot associated with type 2 diabetes mellitus, with bone involvement without evidence of necrosis  E11.621 250.80    L97.416 707.14          Physician Supervision  I provided direct supervision and was immediately available to furnish assistance and direction throughout the performance of the procedure.    Emergency Response Team  A trained emergency response team and emergency services were available throughout procedure.

## 2023-01-06 ENCOUNTER — TELEPHONE (OUTPATIENT)
Dept: WOUND CARE | Facility: HOSPITAL | Age: 53
End: 2023-01-06
Payer: MEDICARE

## 2023-01-06 ENCOUNTER — HOSPITAL ENCOUNTER (OUTPATIENT)
Dept: WOUND CARE | Facility: HOSPITAL | Age: 53
Discharge: HOME | End: 2023-01-06
Attending: PODIATRIST
Payer: MEDICARE

## 2023-01-06 ENCOUNTER — HOSPITAL ENCOUNTER (OUTPATIENT)
Dept: WOUND CARE | Facility: HOSPITAL | Age: 53
Discharge: HOME | End: 2023-01-06
Attending: INTERNAL MEDICINE
Payer: MEDICARE

## 2023-01-06 VITALS
SYSTOLIC BLOOD PRESSURE: 162 MMHG | TEMPERATURE: 98 F | OXYGEN SATURATION: 98 % | HEART RATE: 96 BPM | DIASTOLIC BLOOD PRESSURE: 76 MMHG | WEIGHT: 240 LBS | BODY MASS INDEX: 31.81 KG/M2 | HEIGHT: 73 IN

## 2023-01-06 DIAGNOSIS — E11.621 DIABETIC ULCER OF RIGHT MIDFOOT ASSOCIATED WITH TYPE 2 DIABETES MELLITUS, WITH BONE INVOLVEMENT WITHOUT EVIDENCE OF NECROSIS: ICD-10-CM

## 2023-01-06 DIAGNOSIS — M86.60 ACUTE ON CHRONIC OSTEOMYELITIS: ICD-10-CM

## 2023-01-06 DIAGNOSIS — L97.512 DIABETIC ULCER OF RIGHT FOOT ASSOCIATED WITH TYPE 2 DIABETES MELLITUS, WITH FAT LAYER EXPOSED: ICD-10-CM

## 2023-01-06 DIAGNOSIS — L97.416 DIABETIC ULCER OF RIGHT MIDFOOT ASSOCIATED WITH TYPE 2 DIABETES MELLITUS, WITH BONE INVOLVEMENT WITHOUT EVIDENCE OF NECROSIS: ICD-10-CM

## 2023-01-06 DIAGNOSIS — M86.671 CHRONIC OSTEOMYELITIS OF RIGHT FOOT: ICD-10-CM

## 2023-01-06 DIAGNOSIS — M86.671 CHRONIC OSTEOMYELITIS OF RIGHT FOOT: Primary | ICD-10-CM

## 2023-01-06 DIAGNOSIS — L97.529 DIABETIC ULCER OF LEFT FOOT: Primary | ICD-10-CM

## 2023-01-06 DIAGNOSIS — M86.10 ACUTE ON CHRONIC OSTEOMYELITIS: ICD-10-CM

## 2023-01-06 DIAGNOSIS — E11.621 DIABETIC ULCER OF RIGHT FOOT ASSOCIATED WITH TYPE 2 DIABETES MELLITUS, WITH FAT LAYER EXPOSED: ICD-10-CM

## 2023-01-06 DIAGNOSIS — E11.621 DIABETIC ULCER OF LEFT FOOT: Primary | ICD-10-CM

## 2023-01-06 LAB
POCT GLUCOSE: 140 MG/DL (ref 70–110)
POCT GLUCOSE: 177 MG/DL (ref 70–110)

## 2023-01-06 PROCEDURE — 11042 DBRDMT SUBQ TIS 1ST 20SQCM/<: CPT | Mod: ,,, | Performed by: PODIATRIST

## 2023-01-06 PROCEDURE — 99183 HYPERBARIC OXYGEN THERAPY: CPT | Mod: ,,, | Performed by: INTERNAL MEDICINE

## 2023-01-06 PROCEDURE — 11042 WOUND DEBRIDEMENT: ICD-10-PCS | Mod: ,,, | Performed by: PODIATRIST

## 2023-01-06 PROCEDURE — 99183 PR HYPERBARIC OXYGEN THERAPY ATTENDANCE/SUPERVISION, PER SESSION: ICD-10-PCS | Mod: ,,, | Performed by: INTERNAL MEDICINE

## 2023-01-06 PROCEDURE — G0277 HBOT, FULL BODY CHAMBER, 30M: HCPCS

## 2023-01-06 PROCEDURE — 99499 UNLISTED E&M SERVICE: CPT | Mod: ,,, | Performed by: PODIATRIST

## 2023-01-06 PROCEDURE — 11042 DBRDMT SUBQ TIS 1ST 20SQCM/<: CPT | Performed by: PODIATRIST

## 2023-01-06 PROCEDURE — 99499 NO LOS: ICD-10-PCS | Mod: ,,, | Performed by: PODIATRIST

## 2023-01-06 NOTE — PROGRESS NOTES
[facility]                Progress Note    Subjective:       Patient ID: Indio Ryder Jr. is a 52 y.o. male.    Chief Complaint: Wound Check    HPI    Review of Systems      Objective:        Physical Exam    Vitals:    01/06/23 1407   BP: (!) 162/76   Pulse: 96   Temp: 97.7 °F (36.5 °C)       Assessment:           ICD-10-CM ICD-9-CM   1. Chronic osteomyelitis of right foot  M86.671 730.17   2. Diabetic ulcer of right foot associated with type 2 diabetes mellitus, with fat layer exposed  E11.621 250.80    L97.512 707.15   3. Diabetic ulcer of left foot  E11.621 250.80    L97.529 707.15            Wound 04/08/22 1137 Diabetic Ulcer Left plantar;medial Foot (Active)   04/08/22 1137    Pre-existing: Yes   Primary Wound Type: Diabetic ulc   Side: Left   Orientation: plantar;medial   Location: Foot   Wound Number:    Ankle-Brachial Index:    Pulses:    Removal Indication and Assessment:    Wound Outcome:    (Retired) Wound Type:    (Retired) Wound Length (cm):    (Retired) Wound Width (cm):    (Retired) Depth (cm):    Wound Description (Comments):    Removal Indications:    Wound Image    01/06/23 1416   Wound WDL ex 01/06/23 1416   Dressing Appearance Intact;Dry 01/06/23 1416   Drainage Amount None 01/06/23 1416   Appearance Red 01/06/23 1416   Tissue loss description Partial thickness 01/06/23 1416   Black (%), Wound Tissue Color 0 % 01/06/23 1416   Red (%), Wound Tissue Color 100 % 01/06/23 1416   Yellow (%), Wound Tissue Color 0 % 01/06/23 1416   Periwound Area Macerated;Pale white 01/06/23 1416   Wound Edges Defined;Callused 01/06/23 1416   Wound Length (cm) 2.2 cm 01/06/23 1416   Wound Width (cm) 0.4 cm 01/06/23 1416   Wound Depth (cm) 0.3 cm 01/06/23 1416   Wound Volume (cm^3) 0.264 cm^3 01/06/23 1416   Wound Surface Area (cm^2) 0.88 cm^2 01/06/23 1416   Care Cleansed with:;Soap and water;Sterile normal saline 01/06/23 1416   Dressing Changed 01/06/23 1416    Periwound Care Moisture barrier applied 01/06/23 1416   Compression Tubular elasticized bandage 01/06/23 1416   Off Loading Football dressing;Off loading shoe 01/06/23 1416   Dressing Change Due 01/13/23 01/06/23 1416            Wound 05/06/22 1142 Diabetic Ulcer Right plantar Foot (Active)   05/06/22 1142    Pre-existing: Yes   Primary Wound Type: Diabetic ulc   Side: Right   Orientation: plantar   Location: Foot   Wound Number:    Ankle-Brachial Index:    Pulses:    Removal Indication and Assessment:    Wound Outcome:    (Retired) Wound Type:    (Retired) Wound Length (cm):    (Retired) Wound Width (cm):    (Retired) Depth (cm):    Wound Description (Comments):    Removal Indications:    Wound Image   01/06/23 1416   Wound WDL WDL 01/06/23 1416   Dressing Appearance Dry;Intact 01/06/23 1416   Drainage Amount None 01/06/23 1416   Appearance Epithelialization 01/06/23 1416   Tissue loss description Not applicable 01/06/23 1416   Periwound Area Dry;Intact 01/06/23 1416   Wound Edges Rolled/closed 01/06/23 1416   Wound Length (cm) 0.1 cm 01/06/23 1416   Wound Width (cm) 0.1 cm 01/06/23 1416   Wound Depth (cm) 0 cm 01/06/23 1416   Wound Volume (cm^3) 0 cm^3 01/06/23 1416   Wound Surface Area (cm^2) 0.01 cm^2 01/06/23 1416   Care Cleansed with:;Sterile normal saline;Soap and water 01/06/23 1416   Dressing Changed 01/06/23 1416   Compression Tubular elasticized bandage 01/06/23 1416   Off Loading Football dressing;Off loading shoe 01/06/23 1416   Dressing Change Due 01/13/23 01/06/23 1416           Plan:              Tissue pathology and/or culture taken     [] Yes      [] No  Sharp debridement performed                   [] Yes       [] No  Labs ordered     [] Yes       [] No  Imaging ordered    [] Yes      [] No    Orders Placed This Encounter   Procedures    Change dressing     Clean with NS. Right foot: Betadine to wound. Custom felt pad to plantar foot with small opening over wound. Abram foam short x1. Left  foot: Gentian violet to periwound. Endoform to wound bed. Custom felt pad with small opening over wound to plantar foot. Mextra short x1, tim foam short x1 (long n/a). Bilateral football dressing (cast padding x3, coban.) Tubigrip size D (right calf 35cm, left calf 35.5cm).        Follow up in about 1 week (around 1/13/2023) for MD wound care visit.

## 2023-01-06 NOTE — PROGRESS NOTES
01/06/23 1030   Hyperbaric Pre-Inspection   Mattress cleaned prior to treatment Yes   2 Patient Identifiers Verified Yes   Consent Obtained Yes   Cotton Gown Yes   Patient voided Yes   Drainage Bags Emptied Not applicable   Patient Pregnant Not applicable   Glasses, Jewelry, Makeup, etc. Removed Yes   Dentures, Hearing Aid, Prosthetic Devices Removed Yes   Touch hair to check for hair spray Yes   Cold/Flu Symptoms No   Diabetic Patient Eaten Yes   Medications Given Not applicable   Fears and apprehensions verbalized Yes   Ground Wire Secured Yes   HBO Treatment Course Details   Treatment Course Number 1   Ordering Provider Jennifer SCHILLING   Indications Diabetic wounds of lower extremities   HBO Treatment Details   Treatment Number 1219   Inpatient Visit No   Total Treatment Length Calc (minutes) 109   Chamber Type Monoplace   Chamber # 2   HBO Dive Log # 1154   Treatment Protocol 2.0 SURI x 90 minutes w/100% oxygen and no air break   Treatment Details   Dive Rate Down 1.5 psi/minute   Dive Rate Up 2.0 psi/minute   Compress Begins 1 SURI   Clock Time 1046   Tx Pressure Reached 2 SURI   Clock Time 1056   Decompress Begins 2 SURI   Clock Time 1226   Decompress Ends 1 SURI   Clock Time 1235   Pre HBO Vital Signs   BP (!) 149/86   Pulse 86   Resp 18   Temp 97.7 °F (36.5 °C)   Blood Glucose 177   Glucose Meter # wc   Pain Level 0   Post HBO Vital Signs   BP (!) 177/83   Pulse 84   Resp 16   Blood Glucose 140   Glucose Meter # wc   Pain Level 0   Ear Evaluation   Left Teed Scale Pre Grade 0   Left Teed Scale Post Grade 0   Right Teed Scale Pre Grade 0   Right Teed Scale Post Grade 0         Arrived awake and alert. ABC's intact. Ambulated without assistance. Cleared for treatment by Waylon SCHILLING Tolerated treatment without complaints. Cleared from chamber without incidents. Follow up Monday for HBO.         ICD-10-CM ICD-9-CM   1. Chronic osteomyelitis of right foot  M86.671 730.17   2. Diabetic ulcer of right foot associated  with type 2 diabetes mellitus, with fat layer exposed  E11.621 250.80    L97.512 707.15   3. Acute on chronic osteomyelitis  M86.10 730.00    M86.60 730.10   4. Diabetic ulcer of right midfoot associated with type 2 diabetes mellitus, with bone involvement without evidence of necrosis  E11.621 250.80    L97.416 707.14        Physician Supervision  I provided direct supervision and was immediately available to furnish assistance and direction throughout the performance of the procedure.    Emergency Response Team  A trained emergency response team and emergency services were available throughout procedure.

## 2023-01-06 NOTE — PROGRESS NOTES
[facility]                Progress Note    Subjective:       Patient ID: Indio Ryder Jr. is a 52 y.o. male.    Chief Complaint: Wound Check    F/u wound care visit. Patient ambulatory to exam room with no difficulty noted. Patient denies pain or discomfort at present. Wound dressing to RLE intact with no drainage noted and sutures intact.  Wound dressing to LLE intact with scant drainage noted and sutures intact. Sutures removed per MD. Wound to right plantar foot measuring smaller with epithelization noted to wound bed. Wound to left plantar foot measuring larger in all dimensions. Wound care done per order. RTC in one week for MD visit. Continue HBOT as ordered. AVS offered, patient refused.    Review of Systems   Constitutional:  Positive for activity change.   Respiratory:  Negative for shortness of breath.    Cardiovascular:  Negative for chest pain and leg swelling.   Gastrointestinal:  Negative for nausea and vomiting.   Musculoskeletal:  Positive for arthralgias.   Skin:  Positive for wound.   Neurological:  Positive for numbness. Negative for weakness.       Objective:        Physical Exam  Constitutional:       Appearance: He is well-developed.      Comments: Oriented to time, place, and person.   Cardiovascular:      Comments: DP and PT pulses are palpable bilaterally. 3 sec capillary refill time and toes and feet are warm to touch proximally .  There is  hair growth on the feet and toes b/l. There is no edema b/l. No spider veins or varicosities present b/l.     Musculoskeletal:      Comments: Equinus noted b/l ankles with < 10 deg DF noted. MMT 5/5 in DF/PF/Inv/Ev resistance with no reproduction of pain in any direction. Passive range of motion of ankle and pedal joints is painless b/l.     Feet:      Right foot:      Skin integrity: No callus or dry skin.      Left foot:      Skin integrity: No callus or dry skin.   Lymphadenopathy:      Comments:  Negative lymphadenopathy bilateral popliteal fossa and tarsal tunnel.   Skin:     Comments: See wound description    Neurological:      Mental Status: He is alert.      Comments: Light touch, proprioception, and sharp/dull sensation are all intact bilaterally. Protective threshold with the [place] monofilament is intact bilaterally.    Psychiatric:         Behavior: Behavior is cooperative.       Vitals:    01/06/23 1407   BP: (!) 162/76   Pulse: 96   Temp: 97.7 °F (36.5 °C)       Assessment:           ICD-10-CM ICD-9-CM   1. Chronic osteomyelitis of right foot  M86.671 730.17   2. Diabetic ulcer of right foot associated with type 2 diabetes mellitus, with fat layer exposed  E11.621 250.80    L97.512 707.15   3. Diabetic ulcer of left foot  E11.621 250.80    L97.529 707.15            Wound 04/08/22 1137 Diabetic Ulcer Left plantar;medial Foot (Active)   04/08/22 1137    Pre-existing: Yes   Primary Wound Type: Diabetic ulc   Side: Left   Orientation: plantar;medial   Location: Foot   Wound Number:    Ankle-Brachial Index:    Pulses:    Removal Indication and Assessment:    Wound Outcome:    (Retired) Wound Type:    (Retired) Wound Length (cm):    (Retired) Wound Width (cm):    (Retired) Depth (cm):    Wound Description (Comments):    Removal Indications:    Wound Image    01/06/23 1416   Wound WDL ex 01/06/23 1416   Dressing Appearance Intact;Dry 01/06/23 1416   Drainage Amount None 01/06/23 1416   Appearance Red 01/06/23 1416   Tissue loss description Partial thickness 01/06/23 1416   Black (%), Wound Tissue Color 0 % 01/06/23 1416   Red (%), Wound Tissue Color 100 % 01/06/23 1416   Yellow (%), Wound Tissue Color 0 % 01/06/23 1416   Periwound Area Macerated;Pale white 01/06/23 1416   Wound Edges Defined;Callused 01/06/23 1416   Wound Length (cm) 2.2 cm 01/06/23 1416   Wound Width (cm) 0.4 cm 01/06/23 1416   Wound Depth (cm) 0.3 cm 01/06/23 1416   Wound Volume (cm^3) 0.264 cm^3 01/06/23 1416   Wound Surface  Area (cm^2) 0.88 cm^2 01/06/23 1416   Care Cleansed with:;Soap and water;Sterile normal saline 01/06/23 1416   Dressing Changed 01/06/23 1416   Periwound Care Moisture barrier applied 01/06/23 1416   Compression Tubular elasticized bandage 01/06/23 1416   Off Loading Football dressing;Off loading shoe 01/06/23 1416   Dressing Change Due 01/13/23 01/06/23 1416            Wound 05/06/22 1142 Diabetic Ulcer Right plantar Foot (Active)   05/06/22 1142    Pre-existing: Yes   Primary Wound Type: Diabetic ulc   Side: Right   Orientation: plantar   Location: Foot   Wound Number:    Ankle-Brachial Index:    Pulses:    Removal Indication and Assessment:    Wound Outcome:    (Retired) Wound Type:    (Retired) Wound Length (cm):    (Retired) Wound Width (cm):    (Retired) Depth (cm):    Wound Description (Comments):    Removal Indications:    Wound Image   01/06/23 1416   Wound WDL WDL 01/06/23 1416   Dressing Appearance Dry;Intact 01/06/23 1416   Drainage Amount None 01/06/23 1416   Appearance Epithelialization 01/06/23 1416   Tissue loss description Not applicable 01/06/23 1416   Periwound Area Dry;Intact 01/06/23 1416   Wound Edges Rolled/closed 01/06/23 1416   Wound Length (cm) 0.1 cm 01/06/23 1416   Wound Width (cm) 0.1 cm 01/06/23 1416   Wound Depth (cm) 0 cm 01/06/23 1416   Wound Volume (cm^3) 0 cm^3 01/06/23 1416   Wound Surface Area (cm^2) 0.01 cm^2 01/06/23 1416   Care Cleansed with:;Sterile normal saline;Soap and water 01/06/23 1416   Dressing Changed 01/06/23 1416   Compression Tubular elasticized bandage 01/06/23 1416   Off Loading Football dressing;Off loading shoe 01/06/23 1416   Dressing Change Due 01/13/23 01/06/23 1416           Plan:            All sutures removed.     Debridement: see procedure note      Dressings: per above  Offloading:Foam    Follow-up:Patient is to return to the clinic in 1 week  for follow-up but should call [facility] immediately if any signs of infection, such as fever, chills, sweats,  increased redness or pain.    Short-term goals include maintaining good offloading and minimizing bioburden, promoting granulation and epithelialization to healing.  Long-term goals include keeping the wound healed by good offloading and medical management under the direction of internist.    Tissue pathology and/or culture taken     [] Yes      [x] No  Sharp debridement performed                   [x] Yes       [] No  Labs ordered     [] Yes       [x] No  Imaging ordered    [] Yes      [x] No    Orders Placed This Encounter   Procedures    Change dressing     Clean with NS. Right foot: Betadine to wound. Custom felt pad to plantar foot with small opening over wound. Abram foam short x1. Left foot: Gentian violet to periwound. Endoform to wound bed. Custom felt pad with small opening over wound to plantar foot. Mextra short x1, abram foam short x1 (long n/a). Bilateral football dressing (cast padding x3, coban.) Tubigrip size D (right calf 35cm, left calf 35.5cm).        Follow up in about 1 week (around 1/13/2023) for MD wound care visit.

## 2023-01-08 NOTE — PROCEDURES
Wound Debridement    Date/Time: 1/6/2023 10:09 AM  Performed by: Anaid Stern DPM  Authorized by: Anaid Stern DPM     Consent Done?:  Yes (Written)    Wound Details:    Location:  Left foot    Location:  Left Plantar    Type of Debridement:  Excisional       Length (cm):  2.2       Area (sq cm):  0.88       Width (cm):  0.4       Percent Debrided (%):  100       Depth (cm):  0.3       Total Area Debrided (sq cm):  0.88    Depth of debridement:  Subcutaneous tissue    Tissue debrided:  Dermis    Devitalized tissue debrided:  Biofilm, Exudate and Fibrin    Instruments:  Curette    Bleeding:  Minimal  Hemostasis Achieved: Yes    Method Used:  Pressure  Patient tolerance:  Patient tolerated the procedure well with no immediate complications

## 2023-01-09 ENCOUNTER — HOSPITAL ENCOUNTER (OUTPATIENT)
Dept: WOUND CARE | Facility: HOSPITAL | Age: 53
Discharge: HOME | End: 2023-01-09
Attending: INTERNAL MEDICINE
Payer: MEDICARE

## 2023-01-09 DIAGNOSIS — M86.10 ACUTE ON CHRONIC OSTEOMYELITIS: ICD-10-CM

## 2023-01-09 DIAGNOSIS — L97.512 DIABETIC ULCER OF RIGHT FOOT ASSOCIATED WITH TYPE 2 DIABETES MELLITUS, WITH FAT LAYER EXPOSED: ICD-10-CM

## 2023-01-09 DIAGNOSIS — L97.416 DIABETIC ULCER OF RIGHT MIDFOOT ASSOCIATED WITH TYPE 2 DIABETES MELLITUS, WITH BONE INVOLVEMENT WITHOUT EVIDENCE OF NECROSIS: ICD-10-CM

## 2023-01-09 DIAGNOSIS — E11.621 DIABETIC ULCER OF RIGHT FOOT ASSOCIATED WITH TYPE 2 DIABETES MELLITUS, WITH FAT LAYER EXPOSED: ICD-10-CM

## 2023-01-09 DIAGNOSIS — M86.60 ACUTE ON CHRONIC OSTEOMYELITIS: ICD-10-CM

## 2023-01-09 DIAGNOSIS — E11.621 DIABETIC ULCER OF RIGHT MIDFOOT ASSOCIATED WITH TYPE 2 DIABETES MELLITUS, WITH BONE INVOLVEMENT WITHOUT EVIDENCE OF NECROSIS: ICD-10-CM

## 2023-01-09 DIAGNOSIS — M86.671 CHRONIC OSTEOMYELITIS OF RIGHT FOOT: Primary | ICD-10-CM

## 2023-01-09 LAB
POCT GLUCOSE: 104 MG/DL (ref 70–110)
POCT GLUCOSE: 158 MG/DL (ref 70–110)

## 2023-01-09 PROCEDURE — G0277 HBOT, FULL BODY CHAMBER, 30M: HCPCS | Performed by: INTERNAL MEDICINE

## 2023-01-09 PROCEDURE — 82962 GLUCOSE BLOOD TEST: CPT | Mod: 59 | Performed by: INTERNAL MEDICINE

## 2023-01-09 PROCEDURE — 99183 PR HYPERBARIC OXYGEN THERAPY ATTENDANCE/SUPERVISION, PER SESSION: ICD-10-PCS | Mod: ,,, | Performed by: INTERNAL MEDICINE

## 2023-01-09 PROCEDURE — 99183 HYPERBARIC OXYGEN THERAPY: CPT | Mod: ,,, | Performed by: INTERNAL MEDICINE

## 2023-01-09 NOTE — PROGRESS NOTES
01/09/23 1000   Hyperbaric Pre-Inspection   Mattress cleaned prior to treatment Yes   2 Patient Identifiers Verified Yes   Consent Obtained Yes   Cotton Gown Yes   Patient voided Yes   Drainage Bags Emptied Not applicable   Patient Pregnant Not applicable   Glasses, Jewelry, Makeup, etc. Removed Yes   Hard contacts removed Yes   Dentures, Hearing Aid, Prosthetic Devices Removed Yes   Touch hair to check for hair spray Yes   Cold/Flu Symptoms No   Diabetic Patient Eaten Yes   Medications Given Not applicable   Fears and apprehensions verbalized Yes   Ground Wire Secured Yes   HBO Treatment Course Details   Treatment Course Number 1   Ordering Provider Jennifer SCHILLING   Indications Diabetic wounds of lower extremities   HBO Treatment Start Date 12/19/23   HBO Treatment Details   Treatment Number 13   Inpatient Visit No   Total Treatment Length Calc (minutes) 108   Chamber Type Monoplace   Chamber # 2   HBO Dive Log # 1156   Treatment Protocol 2.0 SURI x 90 minutes w/100% oxygen and no air break   Treatment Details   Dive Rate Down 1.5 psi/minute   Dive Rate Up 2.0 psi/minute   Compress Begins 1 SURI   Clock Time 1027   Tx Pressure Reached 2 SURI   Clock Time 1037   Decompress Begins 2 SURI   Clock Time 1207   Decompress Ends 1 SURI   Clock Time 1215   Pre HBO Vital Signs   BP (!) 141/67   Pulse 96   Resp 16   Temp 97.7 °F (36.5 °C)   Blood Glucose 158   Glucose Meter # wc   Pain Level 0   Post HBO Vital Signs   BP (!) 160/86   Pulse 87   Resp 16   Blood Glucose 104   Glucose Meter # wc   Pain Level 0           Arrived awake and alert. ABC's intact. Ambulated without assistance. Cleared for treatment by Waylon SCHILLING Tolerated treatment without complaints. Cleared from chamber without incidents. Follow up tomorrow for HBO.         ICD-10-CM ICD-9-CM   1. Chronic osteomyelitis of right foot  M86.671 730.17   2. Acute on chronic osteomyelitis  M86.10 730.00    M86.60 730.10   3. Diabetic ulcer of right foot associated with  type 2 diabetes mellitus, with fat layer exposed  E11.621 250.80    L97.512 707.15   4. Diabetic ulcer of right midfoot associated with type 2 diabetes mellitus, with bone involvement without evidence of necrosis  E11.621 250.80    L97.416 707.14        Physician Supervision  I provided direct supervision and was immediately available to furnish assistance and direction throughout the performance of the procedure.    Emergency Response Team  A trained emergency response team and emergency services were available throughout procedure.

## 2023-01-10 ENCOUNTER — HOSPITAL ENCOUNTER (OUTPATIENT)
Dept: WOUND CARE | Facility: HOSPITAL | Age: 53
Discharge: HOME | End: 2023-01-10
Attending: FAMILY MEDICINE
Payer: MEDICARE

## 2023-01-10 DIAGNOSIS — L97.512 DIABETIC ULCER OF RIGHT FOOT ASSOCIATED WITH TYPE 2 DIABETES MELLITUS, WITH FAT LAYER EXPOSED: ICD-10-CM

## 2023-01-10 DIAGNOSIS — E11.621 DIABETIC ULCER OF RIGHT FOOT ASSOCIATED WITH TYPE 2 DIABETES MELLITUS, WITH FAT LAYER EXPOSED: ICD-10-CM

## 2023-01-10 DIAGNOSIS — E11.621 DIABETIC ULCER OF RIGHT MIDFOOT ASSOCIATED WITH TYPE 2 DIABETES MELLITUS, WITH BONE INVOLVEMENT WITHOUT EVIDENCE OF NECROSIS: ICD-10-CM

## 2023-01-10 DIAGNOSIS — M86.60 ACUTE ON CHRONIC OSTEOMYELITIS: ICD-10-CM

## 2023-01-10 DIAGNOSIS — M86.10 ACUTE ON CHRONIC OSTEOMYELITIS: ICD-10-CM

## 2023-01-10 DIAGNOSIS — M86.671 CHRONIC OSTEOMYELITIS OF RIGHT FOOT: Primary | ICD-10-CM

## 2023-01-10 DIAGNOSIS — L97.416 DIABETIC ULCER OF RIGHT MIDFOOT ASSOCIATED WITH TYPE 2 DIABETES MELLITUS, WITH BONE INVOLVEMENT WITHOUT EVIDENCE OF NECROSIS: ICD-10-CM

## 2023-01-10 LAB
POCT GLUCOSE: 221 MG/DL (ref 70–110)
POCT GLUCOSE: 233 MG/DL (ref 70–110)

## 2023-01-10 PROCEDURE — 99183 PR HYPERBARIC OXYGEN THERAPY ATTENDANCE/SUPERVISION, PER SESSION: ICD-10-PCS | Mod: ,,, | Performed by: FAMILY MEDICINE

## 2023-01-10 PROCEDURE — 99183 HYPERBARIC OXYGEN THERAPY: CPT | Mod: ,,, | Performed by: FAMILY MEDICINE

## 2023-01-10 NOTE — PROGRESS NOTES
01/10/23 1000   Hyperbaric Pre-Inspection   Mattress cleaned prior to treatment Yes   2 Patient Identifiers Verified Yes   Consent Obtained Yes   Cotton Gown Yes   Patient voided Yes   Drainage Bags Emptied Not applicable   Patient Pregnant Not applicable   Glasses, Jewelry, Makeup, etc. Removed Yes   Dentures, Hearing Aid, Prosthetic Devices Removed Yes   Touch hair to check for hair spray Yes   Cold/Flu Symptoms No   Diabetic Patient Eaten Yes   Medications Given Not applicable   Fears and apprehensions verbalized Yes   Ground Wire Secured Yes   HBO Treatment Course Details   Treatment Course Number 1   Ordering Provider Jennifer SCHILLING   Indications Diabetic wounds of lower extremities   HBO Treatment Start Date 12/19/23   HBO Treatment Details   Treatment Number 14   Inpatient Visit No   Total Treatment Length Calc (minutes) 108   Chamber Type Monoplace   Chamber # 2   HBO Dive Log # 1158   Treatment Protocol 2.0 SURI x 90 minutes w/100% oxygen and no air break   Treatment Details   Dive Rate Down 1.5 psi/minute   Dive Rate Up 2.0 psi/minute   Compress Begins 1 SURI   Clock Time 1014   Tx Pressure Reached 2 SURI   Clock Time 1024   Decompress Begins 2 SURI   Clock Time 1154   Decompress Ends 1 SURI   Clock Time 1202   Pre HBO Vital Signs   BP (!) 126/47   Pulse 101   Resp 16   Temp 98.1 °F (36.7 °C)   Blood Glucose 221   Glucose Meter # wc   Pain Level 0   Post HBO Vital Signs   BP (!) 162/82   Pulse 94   Resp 16   Blood Glucose 233   Glucose Meter # wc   Pain Level 0       Arrived awake and alert. ABC's intact. Ambulated without.Cleared for treatment by Roberto SCHILLING Tolerated treatment without complaints. Cleared from chamber without incidents. Follow up tomorrow for HBO.         ICD-10-CM ICD-9-CM   1. Chronic osteomyelitis of right foot  M86.671 730.17   2. Diabetic ulcer of right foot associated with type 2 diabetes mellitus, with fat layer exposed  E11.621 250.80    L97.512 707.15   3. Acute on chronic  osteomyelitis  M86.10 730.00    M86.60 730.10   4. Diabetic ulcer of right midfoot associated with type 2 diabetes mellitus, with bone involvement without evidence of necrosis  E11.621 250.80    L97.416 707.14        Physician Supervision  I provided direct supervision and was immediately available to furnish assistance and direction throughout the performance of the procedure.    Emergency Response Team  A trained emergency response team and emergency services were available throughout procedure.

## 2023-01-11 ENCOUNTER — HOSPITAL ENCOUNTER (OUTPATIENT)
Dept: WOUND CARE | Facility: HOSPITAL | Age: 53
Discharge: HOME | End: 2023-01-11
Attending: FAMILY MEDICINE
Payer: MEDICARE

## 2023-01-11 DIAGNOSIS — M86.671 CHRONIC OSTEOMYELITIS OF RIGHT FOOT: Primary | ICD-10-CM

## 2023-01-11 DIAGNOSIS — L97.512 DIABETIC ULCER OF RIGHT FOOT ASSOCIATED WITH TYPE 2 DIABETES MELLITUS, WITH FAT LAYER EXPOSED: ICD-10-CM

## 2023-01-11 DIAGNOSIS — M86.10 ACUTE ON CHRONIC OSTEOMYELITIS: ICD-10-CM

## 2023-01-11 DIAGNOSIS — E11.621 DIABETIC ULCER OF RIGHT FOOT ASSOCIATED WITH TYPE 2 DIABETES MELLITUS, WITH FAT LAYER EXPOSED: ICD-10-CM

## 2023-01-11 DIAGNOSIS — M86.60 ACUTE ON CHRONIC OSTEOMYELITIS: ICD-10-CM

## 2023-01-11 DIAGNOSIS — E11.621 DIABETIC ULCER OF RIGHT MIDFOOT ASSOCIATED WITH TYPE 2 DIABETES MELLITUS, WITH BONE INVOLVEMENT WITHOUT EVIDENCE OF NECROSIS: ICD-10-CM

## 2023-01-11 DIAGNOSIS — L97.416 DIABETIC ULCER OF RIGHT MIDFOOT ASSOCIATED WITH TYPE 2 DIABETES MELLITUS, WITH BONE INVOLVEMENT WITHOUT EVIDENCE OF NECROSIS: ICD-10-CM

## 2023-01-11 LAB
POCT GLUCOSE: 178 MG/DL (ref 70–110)
POCT GLUCOSE: 183 MG/DL (ref 70–110)

## 2023-01-11 PROCEDURE — 82962 GLUCOSE BLOOD TEST: CPT | Mod: 59 | Performed by: FAMILY MEDICINE

## 2023-01-11 PROCEDURE — 99183 PR HYPERBARIC OXYGEN THERAPY ATTENDANCE/SUPERVISION, PER SESSION: ICD-10-PCS | Mod: ,,, | Performed by: FAMILY MEDICINE

## 2023-01-11 PROCEDURE — 99183 HYPERBARIC OXYGEN THERAPY: CPT | Mod: ,,, | Performed by: FAMILY MEDICINE

## 2023-01-11 PROCEDURE — G0277 HBOT, FULL BODY CHAMBER, 30M: HCPCS | Performed by: FAMILY MEDICINE

## 2023-01-11 NOTE — PROGRESS NOTES
01/11/23 1000   Hyperbaric Pre-Inspection   Mattress cleaned prior to treatment Yes   2 Patient Identifiers Verified Yes   Consent Obtained Yes   Cotton Gown Yes   Patient voided Yes   Drainage Bags Emptied Not applicable   Patient Pregnant Not applicable   Glasses, Jewelry, Makeup, etc. Removed Yes   Dentures, Hearing Aid, Prosthetic Devices Removed Yes   Touch hair to check for hair spray Yes   Cold/Flu Symptoms No   Diabetic Patient Eaten Yes   Medications Given Not applicable   Fears and apprehensions verbalized Yes   Ground Wire Secured Yes   HBO Treatment Course Details   Treatment Course Number 1   Ordering Provider Jennifer SCHILLING   Indications Diabetic wounds of lower extremities   HBO Treatment Start Date 12/19/23   HBO Treatment Details   Treatment Number 15   Inpatient Visit No   Total Treatment Length Calc (minutes) 110   Chamber Type Monoplace   Chamber # 2   HBO Dive Log # [id number]   Treatment Protocol 2.0 SURI x 90 minutes w/100% oxygen and no air break   Treatment Details   Dive Rate Down 1.5 psi/minute   Dive Rate Up 2.0 psi/minute   Compress Begins 1 SURI   Clock Time 1022   Tx Pressure Reached 2 SURI   Clock Time 1032   Decompress Begins 2 SURI   Clock Time 1202   Decompress Ends 1 SURI   Clock Time 1212   Pre HBO Vital Signs   BP (!) 111/55   Pulse (!) 114   Resp 16   Temp 98.4 °F (36.9 °C)   Blood Glucose 183   Glucose Meter # wc   Pain Level 0   Post HBO Vital Signs   BP (!) 148/77   Pulse 103   Resp 16   Blood Glucose 178   Glucose Meter # wc   Pain Level 0         Arrived awake and alert. ABC's intact. Ambulated without assistance. Cleared for treatment by JOHNNY Sandoval M.D. Tolerated treatment without complaints. Cleared from chamber without incidents. Follow up tomorrow for HBO.         ICD-10-CM ICD-9-CM   1. Chronic osteomyelitis of right foot  M86.671 730.17   2. Acute on chronic osteomyelitis  M86.10 730.00    M86.60 730.10   3. Diabetic ulcer of right foot associated with type 2 diabetes mellitus,  with fat layer exposed  E11.621 250.80    L97.512 707.15   4. Diabetic ulcer of right midfoot associated with type 2 diabetes mellitus, with bone involvement without evidence of necrosis  E11.621 250.80    L97.416 707.14        Physician Supervision  I provided direct supervision and was immediately available to furnish assistance and direction throughout the performance of the procedure.    Emergency Response Team  A trained emergency response team and emergency services were available throughout procedure.

## 2023-01-12 ENCOUNTER — HOSPITAL ENCOUNTER (OUTPATIENT)
Dept: WOUND CARE | Facility: HOSPITAL | Age: 53
Discharge: HOME | End: 2023-01-12
Attending: FAMILY MEDICINE
Payer: MEDICARE

## 2023-01-12 DIAGNOSIS — M86.671 CHRONIC OSTEOMYELITIS OF RIGHT FOOT: Primary | ICD-10-CM

## 2023-01-12 DIAGNOSIS — E11.621 DIABETIC ULCER OF RIGHT MIDFOOT ASSOCIATED WITH TYPE 2 DIABETES MELLITUS, WITH BONE INVOLVEMENT WITHOUT EVIDENCE OF NECROSIS: ICD-10-CM

## 2023-01-12 DIAGNOSIS — E11.621 DIABETIC ULCER OF RIGHT FOOT ASSOCIATED WITH TYPE 2 DIABETES MELLITUS, WITH FAT LAYER EXPOSED: ICD-10-CM

## 2023-01-12 DIAGNOSIS — M86.10 ACUTE ON CHRONIC OSTEOMYELITIS: ICD-10-CM

## 2023-01-12 DIAGNOSIS — M86.60 ACUTE ON CHRONIC OSTEOMYELITIS: ICD-10-CM

## 2023-01-12 DIAGNOSIS — L97.512 DIABETIC ULCER OF RIGHT FOOT ASSOCIATED WITH TYPE 2 DIABETES MELLITUS, WITH FAT LAYER EXPOSED: ICD-10-CM

## 2023-01-12 DIAGNOSIS — L97.416 DIABETIC ULCER OF RIGHT MIDFOOT ASSOCIATED WITH TYPE 2 DIABETES MELLITUS, WITH BONE INVOLVEMENT WITHOUT EVIDENCE OF NECROSIS: ICD-10-CM

## 2023-01-12 LAB
POCT GLUCOSE: 202 MG/DL (ref 70–110)
POCT GLUCOSE: 203 MG/DL (ref 70–110)

## 2023-01-12 PROCEDURE — 82962 GLUCOSE BLOOD TEST: CPT | Performed by: FAMILY MEDICINE

## 2023-01-12 PROCEDURE — 99183 PR HYPERBARIC OXYGEN THERAPY ATTENDANCE/SUPERVISION, PER SESSION: ICD-10-PCS | Mod: ,,, | Performed by: FAMILY MEDICINE

## 2023-01-12 PROCEDURE — G0277 HBOT, FULL BODY CHAMBER, 30M: HCPCS | Performed by: FAMILY MEDICINE

## 2023-01-12 PROCEDURE — 99183 HYPERBARIC OXYGEN THERAPY: CPT | Mod: ,,, | Performed by: FAMILY MEDICINE

## 2023-01-12 NOTE — PROGRESS NOTES
01/12/23 1000   Hyperbaric Pre-Inspection   Mattress cleaned prior to treatment Yes   2 Patient Identifiers Verified Yes   Consent Obtained Yes   Cotton Gown Yes   Patient voided Yes   Drainage Bags Emptied Not applicable   Patient Pregnant Not applicable   Glasses, Jewelry, Makeup, etc. Removed Yes   Dentures, Hearing Aid, Prosthetic Devices Removed Yes   Touch hair to check for hair spray Yes   Cold/Flu Symptoms No   Diabetic Patient Eaten Yes   Medications Given Not applicable   Fears and apprehensions verbalized Yes   Ground Wire Secured Yes   HBO Treatment Course Details   Treatment Course Number 1   Ordering Provider Jennifer SCHILLING   Indications Diabetic wounds of lower extremities   HBO Treatment Start Date 12/19/23   HBO Treatment Details   Treatment Number 16   Inpatient Visit No   Total Treatment Length Calc (minutes) 108   Chamber Type Monoplace   Chamber # 2   HBO Dive Log # 1162   Treatment Protocol 2.0 SURI x 90 minutes w/100% oxygen and no air break   Treatment Details   Dive Rate Down 1.5 psi/minute   Dive Rate Up 2.0 psi/minute   Compress Begins 1 SURI   Clock Time 1027   Tx Pressure Reached 2 SURI   Clock Time 1037   Decompress Begins 2 SURI   Clock Time 1207   Decompress Ends 1 SURI   Clock Time 1215   Pre HBO Vital Signs   /79   Pulse 102   Resp 18   Temp 98.2 °F (36.8 °C)   Blood Glucose 203   Glucose Meter # wc   Pain Level 0   Post HBO Vital Signs   BP (!) 156/87   Pulse 92   Resp 16   Blood Glucose 202   Glucose Meter # wc   Pain Level 0       Arrived awake and alert. ABC's intact. Ambulated without assistance. Cleared for treatment by Kira SCHILLING Tolerated treatment without complaints. Cleared from chamber without incidents. Follow up tomorrow for HBO.           ICD-10-CM ICD-9-CM   1. Chronic osteomyelitis of right foot  M86.671 730.17   2. Diabetic ulcer of right foot associated with type 2 diabetes mellitus, with fat layer exposed  E11.621 250.80    L97.512 707.15   3. Acute on chronic  osteomyelitis  M86.10 730.00    M86.60 730.10   4. Diabetic ulcer of right midfoot associated with type 2 diabetes mellitus, with bone involvement without evidence of necrosis  E11.621 250.80    L97.416 707.14        Physician Supervision  I provided direct supervision and was immediately available to furnish assistance and direction throughout the performance of the procedure.    Emergency Response Team  A trained emergency response team and emergency services were available throughout procedure.

## 2023-01-13 ENCOUNTER — HOSPITAL ENCOUNTER (OUTPATIENT)
Dept: WOUND CARE | Facility: HOSPITAL | Age: 53
Discharge: HOME | End: 2023-01-13
Attending: FAMILY MEDICINE
Payer: MEDICARE

## 2023-01-13 ENCOUNTER — HOSPITAL ENCOUNTER (OUTPATIENT)
Dept: RADIOLOGY | Facility: HOSPITAL | Age: 53
Discharge: HOME | End: 2023-01-13
Attending: FAMILY MEDICINE
Payer: MEDICARE

## 2023-01-13 VITALS
DIASTOLIC BLOOD PRESSURE: 99 MMHG | SYSTOLIC BLOOD PRESSURE: 180 MMHG | HEART RATE: 88 BPM | TEMPERATURE: 98 F | RESPIRATION RATE: 20 BRPM

## 2023-01-13 DIAGNOSIS — E11.621 DIABETIC ULCER OF LEFT MIDFOOT ASSOCIATED WITH TYPE 2 DIABETES MELLITUS, WITH BONE INVOLVEMENT WITHOUT EVIDENCE OF NECROSIS: ICD-10-CM

## 2023-01-13 DIAGNOSIS — L97.512 DIABETIC ULCER OF RIGHT FOOT ASSOCIATED WITH TYPE 2 DIABETES MELLITUS, WITH FAT LAYER EXPOSED: ICD-10-CM

## 2023-01-13 DIAGNOSIS — L97.416 DIABETIC ULCER OF RIGHT MIDFOOT ASSOCIATED WITH TYPE 2 DIABETES MELLITUS, WITH BONE INVOLVEMENT WITHOUT EVIDENCE OF NECROSIS: Primary | ICD-10-CM

## 2023-01-13 DIAGNOSIS — L97.426 DIABETIC ULCER OF LEFT MIDFOOT ASSOCIATED WITH TYPE 2 DIABETES MELLITUS, WITH BONE INVOLVEMENT WITHOUT EVIDENCE OF NECROSIS: ICD-10-CM

## 2023-01-13 DIAGNOSIS — E11.621 DIABETIC ULCER OF RIGHT MIDFOOT ASSOCIATED WITH TYPE 2 DIABETES MELLITUS, WITH BONE INVOLVEMENT WITHOUT EVIDENCE OF NECROSIS: Primary | ICD-10-CM

## 2023-01-13 DIAGNOSIS — M86.671 CHRONIC OSTEOMYELITIS OF RIGHT FOOT: Primary | ICD-10-CM

## 2023-01-13 DIAGNOSIS — M86.10 ACUTE ON CHRONIC OSTEOMYELITIS: ICD-10-CM

## 2023-01-13 DIAGNOSIS — M86.60 ACUTE ON CHRONIC OSTEOMYELITIS: ICD-10-CM

## 2023-01-13 DIAGNOSIS — E11.621 DIABETIC ULCER OF RIGHT FOOT ASSOCIATED WITH TYPE 2 DIABETES MELLITUS, WITH FAT LAYER EXPOSED: ICD-10-CM

## 2023-01-13 LAB
POCT GLUCOSE: 188 MG/DL (ref 70–110)
POCT GLUCOSE: 190 MG/DL (ref 70–110)

## 2023-01-13 PROCEDURE — 99214 OFFICE O/P EST MOD 30 MIN: CPT | Mod: ,,, | Performed by: FAMILY MEDICINE

## 2023-01-13 PROCEDURE — G0277 HBOT, FULL BODY CHAMBER, 30M: HCPCS | Performed by: FAMILY MEDICINE

## 2023-01-13 PROCEDURE — 73630 X-RAY EXAM OF FOOT: CPT | Mod: 26,LT,, | Performed by: RADIOLOGY

## 2023-01-13 PROCEDURE — 99183 HYPERBARIC OXYGEN THERAPY: CPT | Mod: ,,, | Performed by: FAMILY MEDICINE

## 2023-01-13 PROCEDURE — 99213 OFFICE O/P EST LOW 20 MIN: CPT | Mod: 25 | Performed by: FAMILY MEDICINE

## 2023-01-13 PROCEDURE — 73630 X-RAY EXAM OF FOOT: CPT | Mod: TC,FY,LT

## 2023-01-13 PROCEDURE — 99183 PR HYPERBARIC OXYGEN THERAPY ATTENDANCE/SUPERVISION, PER SESSION: ICD-10-PCS | Mod: ,,, | Performed by: FAMILY MEDICINE

## 2023-01-13 PROCEDURE — 82962 GLUCOSE BLOOD TEST: CPT | Performed by: FAMILY MEDICINE

## 2023-01-13 PROCEDURE — 73630 XR FOOT COMPLETE 3 VIEW LEFT: ICD-10-PCS | Mod: 26,LT,, | Performed by: RADIOLOGY

## 2023-01-13 PROCEDURE — 99214 PR OFFICE/OUTPT VISIT, EST, LEVL IV, 30-39 MIN: ICD-10-PCS | Mod: ,,, | Performed by: FAMILY MEDICINE

## 2023-01-13 NOTE — PROGRESS NOTES
01/13/23 1000   Hyperbaric Pre-Inspection   Mattress cleaned prior to treatment Yes   2 Patient Identifiers Verified Yes   Consent Obtained Yes   Cotton Gown Yes   Patient voided Yes   Drainage Bags Emptied Yes   Patient Pregnant Not applicable   Glasses, Jewelry, Makeup, etc. Removed Yes   Dentures, Hearing Aid, Prosthetic Devices Removed Yes   Touch hair to check for hair spray Yes   Cold/Flu Symptoms No   Diabetic Patient Eaten Yes   Medications Given Not applicable   Fears and apprehensions verbalized Yes   Ground Wire Secured Yes   HBO Treatment Course Details   Treatment Course Number 1   Ordering Provider Jennifer SCHILLING   Indications Diabetic wounds of lower extremities   HBO Treatment Start Date 12/19/23   HBO Treatment Details   Treatment Number 17   Inpatient Visit No   Total Treatment Length Calc (minutes) 108   Chamber Type Monoplace   Chamber # 2   HBO Dive Log # 2767   Treatment Protocol 2.0 SURI x 90 minutes w/100% oxygen and no air break   Treatment Details   Dive Rate Down 1.5 psi/minute   Dive Rate Up 2.0 psi/minute   Compress Begins 1 SURI   Clock Time 1022   Tx Pressure Reached 2 SURI   Clock Time 1032   Decompress Begins 2 SURI   Clock Time 1202   Decompress Ends 1 SURI   Clock Time 1210   Pre HBO Vital Signs   BP (!) 161/83   Pulse 94   Resp 16   Temp 97.7 °F (36.5 °C)   Blood Glucose 190   Glucose Meter # wc   Pain Level 0   Post HBO Vital Signs   BP (!) 153/77   Pulse 95   Resp 16   Blood Glucose 188   Glucose Meter # wc   Pain Level 0     Arrived awake and alert. ABC's intact. Ambulated without assistance. Cleared for treatment by Roberto SCHILLING Tolerated treatment without complaints. Cleared from chamber without incidents. Follow up Tuesday for HBO.       ICD-10-CM ICD-9-CM   1. Chronic osteomyelitis of right foot  M86.671 730.17   2. Diabetic ulcer of right foot associated with type 2 diabetes mellitus, with fat layer exposed  E11.621 250.80    L97.512 707.15   3. Acute on chronic osteomyelitis   M86.10 730.00    M86.60 730.10   4. Diabetic ulcer of left midfoot associated with type 2 diabetes mellitus, with bone involvement without evidence of necrosis  E11.621 250.80    L97.426 707.14        Physician Supervision  I provided direct supervision and was immediately available to furnish assistance and direction throughout the performance of the procedure.    Emergency Response Team  A trained emergency response team and emergency services were available throughout procedure.

## 2023-01-13 NOTE — PROGRESS NOTES
[facility]                Progress Note    Subjective:       Patient ID: Indio Ryder Jr. is a 52 y.o. male.    Chief Complaint: Wound Check    F/u wound care visit. Patient ambulatory to exam room with no difficulty noted. Patient denies pain or discomfort at present. Wound dressing to right foot intact with no drainage noted and wound to right plantar foot appears healed. Wound dressing to left foot intact with moderate amount of drainage noted. Wound to left medial plantar foot measuring larger in all dimensions with bone noted to wound bed. Also noted 4 small reddened areas to mid distal plantar foot. Patient denies walking more than usual on a typical day. Patient instructed on importance of increasing oral protein intake and not applying increased pressure to foot, patient verbalized understanding. Wound care done per order. RTC in one week.    MD note:  patient following up for bilateral DFU's.  He states he has been offloading and wearing both his boot and darco shoe.  He states that when he stands and walks he puts more pressure on the front and heal of his foot to take pressure off the wound site of the left foot.  No fevers, chills, or sweats. He reports keeping dressings in place, clean, and dry    Review of Systems   Constitutional: Negative.    HENT: Negative.     Eyes: Negative.    Respiratory: Negative.     Cardiovascular: Negative.    Gastrointestinal: Negative.    Skin:  Positive for wound.   Psychiatric/Behavioral: Negative.         Objective:        Physical Exam  Constitutional:       Appearance: Normal appearance.   HENT:      Head: Normocephalic and atraumatic.      Right Ear: External ear normal.      Left Ear: External ear normal.      Mouth/Throat:      Mouth: Mucous membranes are moist.      Pharynx: Oropharynx is clear.   Eyes:      Extraocular Movements: Extraocular movements intact.      Conjunctiva/sclera: Conjunctivae normal.       Pupils: Pupils are equal, round, and reactive to light.   Abdominal:      General: There is no distension.      Palpations: Abdomen is soft.   Skin:     General: Skin is warm and dry.      Comments: Right DFU healed  Left DFU opened, macerated, bone palpable, and has serous drainage present   Neurological:      Mental Status: He is alert.       Vitals:    01/13/23 0929   BP: (!) 180/99   Pulse: 88   Resp: 20   Temp: 97.7 °F (36.5 °C)       Assessment:           ICD-10-CM ICD-9-CM   1. Diabetic ulcer of right midfoot associated with type 2 diabetes mellitus, with bone involvement without evidence of necrosis  E11.621 250.80    L97.416 707.14   2. Diabetic ulcer of left midfoot associated with type 2 diabetes mellitus, with bone involvement without evidence of necrosis  E11.621 250.80    L97.426 707.14            Wound 04/08/22 1137 Diabetic Ulcer Left plantar;medial Foot (Active)   04/08/22 1137    Pre-existing: Yes   Primary Wound Type: Diabetic ulc   Side: Left   Orientation: plantar;medial   Location: Foot   Wound Number:    Ankle-Brachial Index:    Pulses:    Removal Indication and Assessment:    Wound Outcome:    (Retired) Wound Type:    (Retired) Wound Length (cm):    (Retired) Wound Width (cm):    (Retired) Depth (cm):    Wound Description (Comments):    Removal Indications:    Wound Image   01/13/23 0927   Wound WDL ex 01/13/23 0927   Dressing Appearance Intact;Moist drainage 01/13/23 0927   Drainage Amount Moderate 01/13/23 0927   Drainage Characteristics/Odor Serosanguineous;No odor 01/13/23 0927   Appearance Red;Bone 01/13/23 0927   Tissue loss description Full thickness 01/13/23 0927   Black (%), Wound Tissue Color 0 % 01/13/23 0927   Red (%), Wound Tissue Color 100 % 01/13/23 0927   Yellow (%), Wound Tissue Color 0 % 01/13/23 0927   Periwound Area Macerated;Pale white 01/13/23 0927   Wound Edges Defined 01/13/23 0927   Meza Classification (diabetic foot ulcers only) Grade 3 01/13/23 0927   Wound Length  (cm) 0.7 cm 01/13/23 0927   Wound Width (cm) 1.5 cm 01/13/23 0927   Wound Depth (cm) 0.7 cm 01/13/23 0927   Wound Volume (cm^3) 0.735 cm^3 01/13/23 0927   Wound Surface Area (cm^2) 1.05 cm^2 01/13/23 0927   Care Cleansed with:;Antimicrobial agent;Soap and water;Sterile normal saline 01/13/23 0927   Dressing Changed 01/13/23 0927   Periwound Care Moisture barrier applied 01/13/23 0927   Compression Tubular elasticized bandage 01/13/23 0927   Off Loading Football dressing;Off loading shoe 01/13/23 0927   Dressing Change Due 01/20/23 01/13/23 0927            Wound 05/06/22 1142 Diabetic Ulcer Right plantar Foot (Active)   05/06/22 1142    Pre-existing: Yes   Primary Wound Type: Diabetic ulc   Side: Right   Orientation: plantar   Location: Foot   Wound Number:    Ankle-Brachial Index:    Pulses:    Removal Indication and Assessment:    Wound Outcome:    (Retired) Wound Type:    (Retired) Wound Length (cm):    (Retired) Wound Width (cm):    (Retired) Depth (cm):    Wound Description (Comments):    Removal Indications:    Wound Image   01/13/23 0927   Wound WDL ex 01/13/23 0927   Dressing Appearance Intact;Dry 01/13/23 0927   Drainage Amount None 01/13/23 0927   Appearance Epithelialization 01/13/23 0927   Tissue loss description Not applicable 01/13/23 0927   Periwound Area Dry;Intact 01/13/23 0927   Wound Edges Rolled/closed 01/13/23 0927   Wound Length (cm) 0 cm 01/13/23 0927   Wound Width (cm) 0 cm 01/13/23 0927   Wound Depth (cm) 0 cm 01/13/23 0927   Wound Volume (cm^3) 0 cm^3 01/13/23 0927   Wound Surface Area (cm^2) 0 cm^2 01/13/23 0927   Care Cleansed with:;Soap and water;Sterile normal saline 01/13/23 0927   Dressing Applied 01/13/23 0927   Compression Tubular elasticized bandage 01/13/23 0927   Off Loading Football dressing;Off loading shoe 01/13/23 0927   Dressing Change Due 01/20/23 01/13/23 0927           Plan:              Tissue pathology and/or culture taken     [] Yes      [x] No  Sharp debridement  performed                   [] Yes       [x] No  Labs ordered     [] Yes       [x] No  Imaging ordered    [x] Yes      [] No    Orders Placed This Encounter   Procedures    MRI Foot (Midfoot) Left W W/O Contrast     Standing Status:   Future     Standing Expiration Date:   1/13/2024     Order Specific Question:   Does the patient have a pacemaker or a defibrilator (Note: Some facilities may not be able to schedule an MRI for patients with pacemakers and defibrillators. You should contact your local radiology department to determine if this is the case.)?     Answer:   No     Order Specific Question:   Does the patient have an aneurysm or surgical clip, pump, nerve/brain stimulator, middle/inner ear prosthesis, or other metal implant or foreign object (bullet, shrapnel)? If they have a card related to their implant, ask them to bring it. Issues related to the implant may cause the MRI to be delayed.     Answer:   No     Order Specific Question:   Is the patient claustrophobic?     Answer:   No     Order Specific Question:   Will the patient require sedation?     Answer:   No     Order Specific Question:   Does the patient have any of the following conditions? Diabetes, History of Renal Disease or Hypertension requiring medical therapy?     Answer:   Yes     Order Specific Question:   May the Radiologist modify the order per protocol to meet the clinical needs of the patient?     Answer:   Yes     Order Specific Question:   Is this part of a Research Study?     Answer:   No     Order Specific Question:   Does the patient have on a skin patch for medication with aluminized backing?     Answer:   No    X-Ray Foot Complete Left     Standing Status:   Future     Standing Expiration Date:   1/13/2024    Change dressing     Clean with NS. Vashe soak x10 min to left foot wound. Right foot: Betadine to wound. Abram foam long x2 (1 over toes for reddened area noted distal 3rd toe. Left foot: Gentian violet to periwound. HB  transfer to wound bed. Drawtex stacked x2, Mextra short x1, Abram foam long x2, short x1. Bilateral football dressing (cast padding x3, coban.) Tubigrip size D (right calf 35cm, left calf 35.5cm).     Xray of left foot to be done today  MRI ordered and will have to be scheduled as it can't be done on the [place] per radiology department     Follow up in about 1 week (around 1/20/2023) for MD wound care visit.     Neftali Burrell MD

## 2023-01-17 ENCOUNTER — HOSPITAL ENCOUNTER (OUTPATIENT)
Dept: WOUND CARE | Facility: HOSPITAL | Age: 53
Discharge: HOME | End: 2023-01-17
Attending: FAMILY MEDICINE
Payer: MEDICARE

## 2023-01-17 ENCOUNTER — OFFICE VISIT (OUTPATIENT)
Dept: INTERNAL MEDICINE | Facility: CLINIC | Age: 53
End: 2023-01-17
Payer: MEDICARE

## 2023-01-17 VITALS — TEMPERATURE: 98 F | SYSTOLIC BLOOD PRESSURE: 150 MMHG | HEART RATE: 85 BPM | DIASTOLIC BLOOD PRESSURE: 78 MMHG

## 2023-01-17 VITALS
DIASTOLIC BLOOD PRESSURE: 78 MMHG | HEART RATE: 98 BPM | OXYGEN SATURATION: 99 % | BODY MASS INDEX: 32.11 KG/M2 | SYSTOLIC BLOOD PRESSURE: 132 MMHG | WEIGHT: 242.31 LBS | HEIGHT: 73 IN

## 2023-01-17 DIAGNOSIS — Z79.4 TYPE 2 DIABETES MELLITUS WITH HYPERGLYCEMIA, WITH LONG-TERM CURRENT USE OF INSULIN: Primary | ICD-10-CM

## 2023-01-17 DIAGNOSIS — E11.621 DIABETIC ULCER OF LEFT MIDFOOT ASSOCIATED WITH TYPE 2 DIABETES MELLITUS, WITH BONE INVOLVEMENT WITHOUT EVIDENCE OF NECROSIS: ICD-10-CM

## 2023-01-17 DIAGNOSIS — E11.621 DIABETIC ULCER OF RIGHT FOOT ASSOCIATED WITH TYPE 2 DIABETES MELLITUS, WITH FAT LAYER EXPOSED: ICD-10-CM

## 2023-01-17 DIAGNOSIS — E11.621 DIABETIC ULCER OF LEFT FOOT: Primary | ICD-10-CM

## 2023-01-17 DIAGNOSIS — M00.9 SEPTIC ARTHRITIS OF LEFT FOOT, DUE TO UNSPECIFIED ORGANISM: ICD-10-CM

## 2023-01-17 DIAGNOSIS — L97.426 DIABETIC ULCER OF LEFT MIDFOOT ASSOCIATED WITH TYPE 2 DIABETES MELLITUS, WITH BONE INVOLVEMENT WITHOUT EVIDENCE OF NECROSIS: ICD-10-CM

## 2023-01-17 DIAGNOSIS — S98.131S: ICD-10-CM

## 2023-01-17 DIAGNOSIS — E11.621 DIABETIC ULCER OF RIGHT MIDFOOT ASSOCIATED WITH TYPE 2 DIABETES MELLITUS, WITH BONE INVOLVEMENT WITHOUT EVIDENCE OF NECROSIS: ICD-10-CM

## 2023-01-17 DIAGNOSIS — E11.65 TYPE 2 DIABETES MELLITUS WITH HYPERGLYCEMIA, WITH LONG-TERM CURRENT USE OF INSULIN: Primary | ICD-10-CM

## 2023-01-17 DIAGNOSIS — M86.10 ACUTE ON CHRONIC OSTEOMYELITIS: ICD-10-CM

## 2023-01-17 DIAGNOSIS — C18.9 COLON ADENOCARCINOMA: ICD-10-CM

## 2023-01-17 DIAGNOSIS — E78.2 MIXED HYPERLIPIDEMIA: ICD-10-CM

## 2023-01-17 DIAGNOSIS — H35.033 HYPERTENSIVE RETINOPATHY, BILATERAL: ICD-10-CM

## 2023-01-17 DIAGNOSIS — M86.60 ACUTE ON CHRONIC OSTEOMYELITIS: ICD-10-CM

## 2023-01-17 DIAGNOSIS — L97.512 DIABETIC ULCER OF RIGHT FOOT ASSOCIATED WITH TYPE 2 DIABETES MELLITUS, WITH FAT LAYER EXPOSED: ICD-10-CM

## 2023-01-17 DIAGNOSIS — L97.416 DIABETIC ULCER OF RIGHT MIDFOOT ASSOCIATED WITH TYPE 2 DIABETES MELLITUS, WITH BONE INVOLVEMENT WITHOUT EVIDENCE OF NECROSIS: ICD-10-CM

## 2023-01-17 DIAGNOSIS — I10 ESSENTIAL HYPERTENSION: ICD-10-CM

## 2023-01-17 DIAGNOSIS — M86.671 CHRONIC OSTEOMYELITIS OF RIGHT FOOT: Primary | ICD-10-CM

## 2023-01-17 DIAGNOSIS — L97.529 DIABETIC ULCER OF LEFT FOOT: Primary | ICD-10-CM

## 2023-01-17 LAB
POCT GLUCOSE: 108 MG/DL (ref 70–110)
POCT GLUCOSE: 90 MG/DL (ref 70–110)
POCT GLUCOSE: 91 MG/DL (ref 70–110)

## 2023-01-17 PROCEDURE — 82962 GLUCOSE BLOOD TEST: CPT

## 2023-01-17 PROCEDURE — 99213 OFFICE O/P EST LOW 20 MIN: CPT | Mod: 27

## 2023-01-17 PROCEDURE — 99214 OFFICE O/P EST MOD 30 MIN: CPT | Mod: S$PBB,,, | Performed by: NURSE PRACTITIONER

## 2023-01-17 PROCEDURE — 99999 PR PBB SHADOW E&M-EST. PATIENT-LVL IV: CPT | Mod: PBBFAC,,, | Performed by: NURSE PRACTITIONER

## 2023-01-17 PROCEDURE — 99214 PR OFFICE/OUTPT VISIT, EST, LEVL IV, 30-39 MIN: ICD-10-PCS | Mod: S$PBB,,, | Performed by: NURSE PRACTITIONER

## 2023-01-17 PROCEDURE — 99214 OFFICE O/P EST MOD 30 MIN: CPT | Mod: PBBFAC,27 | Performed by: NURSE PRACTITIONER

## 2023-01-17 PROCEDURE — 99999 PR PBB SHADOW E&M-EST. PATIENT-LVL IV: ICD-10-PCS | Mod: PBBFAC,,, | Performed by: NURSE PRACTITIONER

## 2023-01-17 PROCEDURE — 99214 OFFICE O/P EST MOD 30 MIN: CPT

## 2023-01-17 RX ORDER — SEMAGLUTIDE 1.34 MG/ML
1 INJECTION, SOLUTION SUBCUTANEOUS
Qty: 3 PEN | Refills: 3 | Status: SHIPPED | OUTPATIENT
Start: 2023-01-17 | End: 2023-10-30

## 2023-01-17 NOTE — PROGRESS NOTES
CHIEF COMPLAINT: Type 2 Diabetes     HPI: Mr. Indio Ryder Jr. is a 52 y.o. male who was diagnosed with Type 2 DM > 20 years.  Has h/o foot ulcers, amputations.  Has boot (L), podiatry in 2019, 2020.  Hospitalized 7/12/19-8/2/19.   Hospitalized 3/2020.  +covid19 3/15/20 vent, extubated 3/26/20, osteomyelitis, DKA  Hyperbarics - goal -300 mg/dl-right and left foot -special shoes  Last seen by me in 6/2022.  Colon ca stage 1 (R) colectomy 4/25/2022.     Stopped lisinopril r/t hypotension 90/60s-dealing with orthostatic hypotension    Stressors: older sister, father, cousin passed away in the past year  Arrived alone    Retired, wakes up around 11a,    Diet varies: admits to eating pork chops, carbs, etc.    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  Needs >100 strips per month related to fluctuations with blood glucose reading, a1c trends, and activity level.  See above     Diabetes Management Status    Statin: Taking  ACE/ARB: Taking    Screening or Prevention Patient's value Goal Complete/Controlled?   HgA1C Testing and Control   Lab Results   Component Value Date    HGBA1C 10.1 (H) 01/13/2023      Annually/Less than 8% Yes   Lipid profile : 01/13/2023 Annually Yes   LDL control Lab Results   Component Value Date    LDLCALC 113.6 01/13/2023    Annually/Less than 100 mg/dl  No   Nephropathy screening Lab Results   Component Value Date    LABMICR 19.0 07/01/2022     Lab Results   Component Value Date    PROTEINUA 1+ (A) 09/03/2020    Annually Yes   Blood pressure BP Readings from Last 1 Encounters:   01/17/23  "132/78    Less than 140/90 Yes   Dilated retinal exam : 10/06/2022 Annually Yes   Foot exam   : 10/17/2022 Annually Yes     REVIEW OF SYSTEMS  General: no weakness, fatigue, +weight stable  Eyes: no double or blurred vision, eye pain, or redness  Cardiovascular: no chest pain, palpitations, edema, or murmurs.   Respiratory: no cough or dyspnea.   GI: + heartburn, nausea, +diarrhea; fair appetite.   Skin: no rashes, dryness, itching, or reactions at insulin injection sites.  Neuro: +numbness, tingling, tremors, or vertigo. +amputation, boot to (L) 5th digit, (R) 5th, great toe   Endocrine: no polyuria, polydipsia, polyphagia, heat or cold intolerance.     Vital Signs  /78 (BP Location: Left arm, Patient Position: Sitting, BP Method: Medium (Manual))   Pulse 98   Ht 6' 1" (1.854 m)   Wt 109.9 kg (242 lb 4.6 oz)   SpO2 99%   BMI 31.97 kg/m²     Hemoglobin A1C   Date Value Ref Range Status   01/13/2023 10.1 (H) 4.0 - 5.6 % Final     Comment:     ADA Screening Guidelines:  5.7-6.4%  Consistent with prediabetes  >or=6.5%  Consistent with diabetes    High levels of fetal hemoglobin interfere with the HbA1C  assay. Heterozygous hemoglobin variants (HbS, HgC, etc)do  not significantly interfere with this assay.   However, presence of multiple variants may affect accuracy.     11/22/2022 9.8 (H) 4.0 - 5.6 % Final     Comment:     ADA Screening Guidelines:  5.7-6.4%  Consistent with prediabetes  >or=6.5%  Consistent with diabetes    High levels of fetal hemoglobin interfere with the HbA1C  assay. Heterozygous hemoglobin variants (HbS, HgC, etc)do  not significantly interfere with this assay.   However, presence of multiple variants may affect accuracy.     07/01/2022 13.3 (H) 4.0 - 5.6 % Final     Comment:     ADA Screening Guidelines:  5.7-6.4%  Consistent with prediabetes  >or=6.5%  Consistent with diabetes    High levels of fetal hemoglobin interfere with the HbA1C  assay. Heterozygous hemoglobin variants (HbS, " HgC, etc)do  not significantly interfere with this assay.   However, presence of multiple variants may affect accuracy.          Chemistry        Component Value Date/Time     11/29/2022 1309    K 3.5 11/29/2022 1309     11/29/2022 1309    CO2 31 (H) 11/29/2022 1309    BUN 9 11/29/2022 1309    CREATININE 0.8 11/29/2022 1309    GLU 78 11/29/2022 1309        Component Value Date/Time    CALCIUM 9.4 11/29/2022 1309    ALKPHOS 229 (H) 11/29/2022 1309    AST 17 01/13/2023 1254    ALT 19 01/13/2023 1254    BILITOT 0.6 11/29/2022 1309    ESTGFRAFRICA >60 07/22/2022 1320    EGFRNONAA >60 07/22/2022 1320           Lab Results   Component Value Date    TSH 1.136 03/01/2012      Lab Results   Component Value Date    CHOL 181 01/13/2023    CHOL 188 07/22/2022    CHOL 100 (L) 07/14/2021     Lab Results   Component Value Date    HDL 49 01/13/2023    HDL 48 07/22/2022    HDL 35 (L) 07/14/2021     Lab Results   Component Value Date    LDLCALC 113.6 01/13/2023    LDLCALC 121.4 07/22/2022    LDLCALC 52.8 (L) 07/14/2021     Lab Results   Component Value Date    TRIG 92 01/13/2023    TRIG 93 07/22/2022    TRIG 61 07/14/2021     Lab Results   Component Value Date    CHOLHDL 27.1 01/13/2023    CHOLHDL 25.5 07/22/2022    CHOLHDL 35.0 07/14/2021       PHYSICAL EXAMINATION  Constitutional: Appears well, no distress  Neck: Supple, trachea midline.   Respiratory: No wheezes, even and unlabored.  Cardiovascular: RRR;  no edema.   Lymph: deferred   Skin: warm and dry; no injection site reactions, no acanthosis nigracans observed.  Neuro:patient alert and cooperative, normal affect; steady gait.    Diabetes Foot Exam:   Deferred     Assessment/Plan    1. Type 2 diabetes mellitus with hyperglycemia, with long-term current use of insulin        2. Diabetic ulcer of right midfoot associated with type 2 diabetes mellitus, with bone involvement without evidence of necrosis        3. Diabetic ulcer of left midfoot associated with type 2  diabetes mellitus, with bone involvement without evidence of necrosis        4. Traumatic amputation of fifth toe of right foot, sequela        5. Septic arthritis of left foot, due to unspecified organism        6. Mixed hyperlipidemia        7. Essential hypertension        8. Hypertensive retinopathy, bilateral        9. Colon adenocarcinoma          1-9. Follow up in 4 months w/ me   Lantus 26 units at night  Increase ozempic to 0.5 mg weekly for 1 months  Send rx for ozempic 1 mg weekly   A1c goal less than 7.5%  Discussed dietary habits and stressors   Has refills  D/c metformin xr -gi issues  F/u with retinal specialist  Bp controlled  add low dose [id number] 10 mg daily

## 2023-01-17 NOTE — PROGRESS NOTES
Arrived awake and alert. ABC's intact. Ambulated without assistance. Initial glucose was 90 mg/dl. Gave pt a strawberry boost to drink. Pt related he didn't eat today and hasn't been eating like her use to. Watied appox 30 minutes and glucose nanci to 108mg/dl. Related to pt that he couldn't treat today.   Follow up Tomorrow for HBO.         ICD-10-CM ICD-9-CM   1. Chronic osteomyelitis of right foot  M86.671 730.17   2. Diabetic ulcer of right foot associated with type 2 diabetes mellitus, with fat layer exposed  E11.621 250.80    L97.512 707.15   3. Acute on chronic osteomyelitis  M86.10 730.00    M86.60 730.10   4. Diabetic ulcer of left midfoot associated with type 2 diabetes mellitus, with bone involvement without evidence of necrosis  E11.621 250.80    L97.426 707.14

## 2023-01-17 NOTE — Clinical Note
Adding jardiance 10 mg , increasing ozempic, a1c needs work, infection with feet- he wants to work on it

## 2023-01-17 NOTE — PROGRESS NOTES
[facility]      Subjective:     Patient seen in clinic today.  Dressing changed as ordered.      Assessment:            ICD-10-CM ICD-9-CM   1. Diabetic ulcer of left foot  E11.621 250.80    L97.529 707.15         Plan:   [unfilled]        No orders of the defined types were placed in this encounter.

## 2023-01-17 NOTE — PATIENT INSTRUCTIONS
Follow up in 4 months w/ Irielle   A1c cmp in 4 months (1-7 days before appt)     Jardiance 10 mg daily   Lantus 26 units at night   Ozempic 0.5 mg weekly for 1 month then 1 mg weekly    Lab Results   Component Value Date    HGBA1C 10.1 (H) 01/13/2023     Goal less than 7.5%     Consider dexcom or rohan 2     [email or web address]  Eat fit fili  Myfitnesspal fili  [email or web address]     Goal

## 2023-01-18 ENCOUNTER — HOSPITAL ENCOUNTER (OUTPATIENT)
Dept: WOUND CARE | Facility: HOSPITAL | Age: 53
Discharge: HOME | End: 2023-01-18
Attending: FAMILY MEDICINE
Payer: MEDICARE

## 2023-01-18 DIAGNOSIS — E11.621 DIABETIC ULCER OF LEFT MIDFOOT ASSOCIATED WITH TYPE 2 DIABETES MELLITUS, WITH BONE INVOLVEMENT WITHOUT EVIDENCE OF NECROSIS: ICD-10-CM

## 2023-01-18 DIAGNOSIS — M86.60 ACUTE ON CHRONIC OSTEOMYELITIS: ICD-10-CM

## 2023-01-18 DIAGNOSIS — M86.10 ACUTE ON CHRONIC OSTEOMYELITIS: ICD-10-CM

## 2023-01-18 DIAGNOSIS — E11.621 DIABETIC ULCER OF RIGHT MIDFOOT ASSOCIATED WITH TYPE 2 DIABETES MELLITUS, WITH BONE INVOLVEMENT WITHOUT EVIDENCE OF NECROSIS: ICD-10-CM

## 2023-01-18 DIAGNOSIS — L97.426 DIABETIC ULCER OF LEFT MIDFOOT ASSOCIATED WITH TYPE 2 DIABETES MELLITUS, WITH BONE INVOLVEMENT WITHOUT EVIDENCE OF NECROSIS: ICD-10-CM

## 2023-01-18 DIAGNOSIS — M86.671 CHRONIC OSTEOMYELITIS OF RIGHT FOOT: Primary | ICD-10-CM

## 2023-01-18 DIAGNOSIS — L97.416 DIABETIC ULCER OF RIGHT MIDFOOT ASSOCIATED WITH TYPE 2 DIABETES MELLITUS, WITH BONE INVOLVEMENT WITHOUT EVIDENCE OF NECROSIS: ICD-10-CM

## 2023-01-18 LAB
POCT GLUCOSE: 273 MG/DL (ref 70–110)
POCT GLUCOSE: 322 MG/DL (ref 70–110)

## 2023-01-18 PROCEDURE — 99183 HYPERBARIC OXYGEN THERAPY: CPT | Mod: ,,, | Performed by: FAMILY MEDICINE

## 2023-01-18 PROCEDURE — 99183 PR HYPERBARIC OXYGEN THERAPY ATTENDANCE/SUPERVISION, PER SESSION: ICD-10-PCS | Mod: ,,, | Performed by: FAMILY MEDICINE

## 2023-01-18 PROCEDURE — G0277 HBOT, FULL BODY CHAMBER, 30M: HCPCS | Performed by: FAMILY MEDICINE

## 2023-01-18 PROCEDURE — 82962 GLUCOSE BLOOD TEST: CPT | Performed by: FAMILY MEDICINE

## 2023-01-18 NOTE — PROGRESS NOTES
01/18/23 1000   Hyperbaric Pre-Inspection   Mattress cleaned prior to treatment Yes   2 Patient Identifiers Verified Yes   Consent Obtained Yes   Cotton Gown Yes   Patient voided Yes   Drainage Bags Emptied Not applicable   Patient Pregnant Not applicable   Glasses, Jewelry, Makeup, etc. Removed Yes   Dentures, Hearing Aid, Prosthetic Devices Removed Yes   Touch hair to check for hair spray Yes   Cold/Flu Symptoms No   Diabetic Patient Eaten Yes   Medications Given Not applicable   Fears and apprehensions verbalized Yes   Ground Wire Secured Yes   HBO Treatment Course Details   Treatment Course Number 1   Ordering Provider Jennifer SCHILLING   Indications Diabetic wounds of lower extremities   HBO Treatment Start Date 12/19/23   HBO Treatment Details   Treatment Number 18   Inpatient Visit No   Total Treatment Length Calc (minutes) 108   Chamber Type Monoplace   Chamber # 2   HBO Dive Log # [id number]   Treatment Protocol 2.0 SURI x 90 minutes w/100% oxygen and no air break   Treatment Details   Dive Rate Down 1.5 psi/minute   Dive Rate Up 2.0 psi/minute   Compress Begins 1 SURI   Clock Time 1029   Tx Pressure Reached 2 SURI   Clock Time 1039   Air Breaks and Breathing Periods 1 5 mins   Clock Time 1109   Air Breaks and Breathing Periods 2 5 mins   Clock Time 2 1139   Decompress Begins 2 SURI   Clock Time 1209   Decompress Ends 1 SURI   Clock Time 1217   Pre HBO Vital Signs   BP (!) 101/59   Pulse 105   Resp 18   Temp 97.7 °F (36.5 °C)   Blood Glucose 322   Glucose Meter # wc   Pain Level 0   Post HBO Vital Signs   BP (!) 177/93   Pulse 85   Resp 16   Blood Glucose 273   Glucose Meter # wc   Pain Level 0         Arrived awake and alert. ABC's intact. Ambulated without assistance. Cleared for treatment by Jennifer SCHILLING Tolerated treatment without complaints. Cleared from chamber without incidents. Follow up tomorrow for HBO.         ICD-10-CM ICD-9-CM   1. Chronic osteomyelitis of right foot  M86.671 730.17   2. Acute on chronic  osteomyelitis  M86.10 730.00    M86.60 730.10   3. 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 Diabetic ulcer of left midfoot associated with type 2 diabetes mellitus, with bone involvement without evidence of necrosis  E11.621 250.80    L97.426 707.14        Physician Supervision  I provided direct supervision and was immediately available to furnish assistance and direction throughout the performance of the procedure.  I reviewed CHT note and agree with plan and progress note    Emergency Response Team  A trained emergency response team and emergency services were available throughout procedure.

## 2023-01-19 ENCOUNTER — HOSPITAL ENCOUNTER (OUTPATIENT)
Dept: RADIOLOGY | Facility: HOSPITAL | Age: 53
Discharge: HOME | End: 2023-01-19
Attending: FAMILY MEDICINE
Payer: MEDICARE

## 2023-01-19 ENCOUNTER — HOSPITAL ENCOUNTER (OUTPATIENT)
Dept: WOUND CARE | Facility: HOSPITAL | Age: 53
Discharge: HOME | End: 2023-01-19
Attending: FAMILY MEDICINE
Payer: MEDICARE

## 2023-01-19 DIAGNOSIS — L97.426 DIABETIC ULCER OF LEFT MIDFOOT ASSOCIATED WITH TYPE 2 DIABETES MELLITUS, WITH BONE INVOLVEMENT WITHOUT EVIDENCE OF NECROSIS: ICD-10-CM

## 2023-01-19 DIAGNOSIS — E11.621 DIABETIC ULCER OF RIGHT MIDFOOT ASSOCIATED WITH TYPE 2 DIABETES MELLITUS, WITH BONE INVOLVEMENT WITHOUT EVIDENCE OF NECROSIS: ICD-10-CM

## 2023-01-19 DIAGNOSIS — M86.60 ACUTE ON CHRONIC OSTEOMYELITIS: ICD-10-CM

## 2023-01-19 DIAGNOSIS — M86.10 ACUTE ON CHRONIC OSTEOMYELITIS: ICD-10-CM

## 2023-01-19 DIAGNOSIS — E11.621 DIABETIC ULCER OF LEFT MIDFOOT ASSOCIATED WITH TYPE 2 DIABETES MELLITUS, WITH BONE INVOLVEMENT WITHOUT EVIDENCE OF NECROSIS: ICD-10-CM

## 2023-01-19 DIAGNOSIS — L97.416 DIABETIC ULCER OF RIGHT MIDFOOT ASSOCIATED WITH TYPE 2 DIABETES MELLITUS, WITH BONE INVOLVEMENT WITHOUT EVIDENCE OF NECROSIS: ICD-10-CM

## 2023-01-19 DIAGNOSIS — M86.671 CHRONIC OSTEOMYELITIS OF RIGHT FOOT: Primary | ICD-10-CM

## 2023-01-19 LAB
POCT GLUCOSE: 139 MG/DL (ref 70–110)
POCT GLUCOSE: 82 MG/DL (ref 70–110)

## 2023-01-19 PROCEDURE — 25500020 PHARM REV CODE 255: Performed by: FAMILY MEDICINE

## 2023-01-19 PROCEDURE — 73720 MRI LWR EXTREMITY W/O&W/DYE: CPT | Mod: TC,LT

## 2023-01-19 PROCEDURE — G0277 HBOT, FULL BODY CHAMBER, 30M: HCPCS | Performed by: FAMILY MEDICINE

## 2023-01-19 PROCEDURE — 99183 PR HYPERBARIC OXYGEN THERAPY ATTENDANCE/SUPERVISION, PER SESSION: ICD-10-PCS | Mod: ,,, | Performed by: FAMILY MEDICINE

## 2023-01-19 PROCEDURE — 82962 GLUCOSE BLOOD TEST: CPT | Mod: 59 | Performed by: FAMILY MEDICINE

## 2023-01-19 PROCEDURE — A9585 GADOBUTROL INJECTION: HCPCS | Performed by: FAMILY MEDICINE

## 2023-01-19 PROCEDURE — 99183 HYPERBARIC OXYGEN THERAPY: CPT | Mod: ,,, | Performed by: FAMILY MEDICINE

## 2023-01-19 PROCEDURE — 73720 MRI FOOT (MIDFOOT) LEFT W W/O CONTRAST: ICD-10-PCS | Mod: 26,LT,, | Performed by: RADIOLOGY

## 2023-01-19 PROCEDURE — 73720 MRI LWR EXTREMITY W/O&W/DYE: CPT | Mod: 26,LT,, | Performed by: RADIOLOGY

## 2023-01-19 RX ORDER — GADOBUTROL 604.72 MG/ML
10 INJECTION INTRAVENOUS
Status: COMPLETED | OUTPATIENT
Start: 2023-01-19 | End: 2023-01-19

## 2023-01-19 RX ADMIN — GADOBUTROL 10 ML: 604.72 INJECTION INTRAVENOUS at 03:01

## 2023-01-19 NOTE — PROGRESS NOTES
01/19/23 1000   Hyperbaric Pre-Inspection   Mattress cleaned prior to treatment Yes   2 Patient Identifiers Verified Yes   Consent Obtained Yes   Cotton Gown Yes   Patient voided Yes   Drainage Bags Emptied Not applicable   Patient Pregnant No   Glasses, Jewelry, Makeup, etc. Removed Yes   Dentures, Hearing Aid, Prosthetic Devices Removed Yes   Touch hair to check for hair spray Yes   Cold/Flu Symptoms No   Diabetic Patient Eaten Not applicable   Medications Given Not applicable   Fears and apprehensions verbalized Yes   Ground Wire Secured Yes   HBO Treatment Course Details   Treatment Course Number 1   Ordering Provider Waylon SCHILLING   Indications Diabetic wounds of lower extremities   HBO Treatment Start Date 12/19/23   HBO Treatment Details   Treatment Number 19   Inpatient Visit No   Total Treatment Length Calc (minutes) 108   Chamber Type Monoplace   Chamber # 2   HBO Dive Log # [id number]   Treatment Protocol 2.0 SURI x 90 minutes w/100% oxygen and no air break   Treatment Details   Dive Rate Down 1.5 psi/minute   Dive Rate Up 2.0 psi/minute   Compress Begins 1 SURI   Clock Time 1030   Tx Pressure Reached 2 SURI   Clock Time 1040   Decompress Begins 2 SURI   Clock Time 1210   Decompress Ends 1 SURI   Clock Time 1218   Pre HBO Vital Signs   BP (!) 152/78   Pulse 91   Resp 18   Temp 97.7 °F (36.5 °C)   Blood Glucose 139   Glucose Meter # wc   Pain Level 0   Post HBO Vital Signs   BP (!) 143/89   Pulse 101   Resp 16   Glucose Meter # wc   Pain Level 0         Arrived awake and alert. ABC's intact. Ambulated without assistance. Cleared for treatment by Kira SCHILLING Tolerated treatment without complaints. Cleared from chamber without incidents. Follow up tomorrow for HBO.         ICD-10-CM ICD-9-CM   1. Chronic osteomyelitis of right foot  M86.671 730.17   2. Acute on chronic osteomyelitis  M86.10 730.00    M86.60 730.10   3. Diabetic ulcer of left midfoot associated with type 2 diabetes mellitus, with bone involvement  without evidence of necrosis  E11.621 250.80    L97.426 707.14   4. Diabetic ulcer of right midfoot associated with type 2 diabetes mellitus, with bone involvement without evidence of necrosis  E11.621 250.80    L97.416 707.14          Physician Supervision  I provided direct supervision and was immediately available to furnish assistance and direction throughout the performance of the procedure.    Emergency Response Team  A trained emergency response team and emergency services were available throughout procedure.

## 2023-01-20 ENCOUNTER — HOSPITAL ENCOUNTER (OUTPATIENT)
Dept: WOUND CARE | Facility: HOSPITAL | Age: 53
Discharge: HOME | End: 2023-01-20
Attending: PODIATRIST
Payer: MEDICARE

## 2023-01-20 ENCOUNTER — HOSPITAL ENCOUNTER (OUTPATIENT)
Dept: WOUND CARE | Facility: HOSPITAL | Age: 53
Discharge: HOME | End: 2023-01-20
Attending: FAMILY MEDICINE
Payer: MEDICARE

## 2023-01-20 VITALS — TEMPERATURE: 98 F | SYSTOLIC BLOOD PRESSURE: 187 MMHG | HEART RATE: 80 BPM | DIASTOLIC BLOOD PRESSURE: 98 MMHG

## 2023-01-20 DIAGNOSIS — E11.621 DIABETIC ULCER OF LEFT MIDFOOT ASSOCIATED WITH TYPE 2 DIABETES MELLITUS, WITH BONE INVOLVEMENT WITHOUT EVIDENCE OF NECROSIS: ICD-10-CM

## 2023-01-20 DIAGNOSIS — E11.621 DIABETIC ULCER OF LEFT FOOT: ICD-10-CM

## 2023-01-20 DIAGNOSIS — L97.426 DIABETIC ULCER OF LEFT MIDFOOT ASSOCIATED WITH TYPE 2 DIABETES MELLITUS, WITH BONE INVOLVEMENT WITHOUT EVIDENCE OF NECROSIS: ICD-10-CM

## 2023-01-20 DIAGNOSIS — M86.10 ACUTE ON CHRONIC OSTEOMYELITIS: ICD-10-CM

## 2023-01-20 DIAGNOSIS — M86.671 CHRONIC OSTEOMYELITIS OF RIGHT FOOT: Primary | ICD-10-CM

## 2023-01-20 DIAGNOSIS — E11.621 DIABETIC ULCER OF RIGHT MIDFOOT ASSOCIATED WITH TYPE 2 DIABETES MELLITUS, WITH BONE INVOLVEMENT WITHOUT EVIDENCE OF NECROSIS: ICD-10-CM

## 2023-01-20 DIAGNOSIS — L97.412 DIABETIC ULCER OF RIGHT MIDFOOT ASSOCIATED WITH DIABETES MELLITUS DUE TO UNDERLYING CONDITION, WITH FAT LAYER EXPOSED: ICD-10-CM

## 2023-01-20 DIAGNOSIS — L97.529 DIABETIC ULCER OF LEFT FOOT: ICD-10-CM

## 2023-01-20 DIAGNOSIS — L97.416 DIABETIC ULCER OF RIGHT MIDFOOT ASSOCIATED WITH TYPE 2 DIABETES MELLITUS, WITH BONE INVOLVEMENT WITHOUT EVIDENCE OF NECROSIS: ICD-10-CM

## 2023-01-20 DIAGNOSIS — M86.60 ACUTE ON CHRONIC OSTEOMYELITIS: Primary | ICD-10-CM

## 2023-01-20 DIAGNOSIS — M86.10 ACUTE ON CHRONIC OSTEOMYELITIS: Primary | ICD-10-CM

## 2023-01-20 DIAGNOSIS — E08.621 DIABETIC ULCER OF RIGHT MIDFOOT ASSOCIATED WITH DIABETES MELLITUS DUE TO UNDERLYING CONDITION, WITH FAT LAYER EXPOSED: ICD-10-CM

## 2023-01-20 DIAGNOSIS — M86.60 ACUTE ON CHRONIC OSTEOMYELITIS: ICD-10-CM

## 2023-01-20 LAB
GRAM STN SPEC: NORMAL
GRAM STN SPEC: NORMAL
POCT GLUCOSE: 175 MG/DL (ref 70–110)
POCT GLUCOSE: 203 MG/DL (ref 70–110)

## 2023-01-20 PROCEDURE — 87070 CULTURE OTHR SPECIMN AEROBIC: CPT | Performed by: PODIATRIST

## 2023-01-20 PROCEDURE — 99499 NO LOS: ICD-10-PCS | Mod: ,,, | Performed by: PODIATRIST

## 2023-01-20 PROCEDURE — 82962 GLUCOSE BLOOD TEST: CPT | Performed by: FAMILY MEDICINE

## 2023-01-20 PROCEDURE — 87075 CULTR BACTERIA EXCEPT BLOOD: CPT | Performed by: PODIATRIST

## 2023-01-20 PROCEDURE — 99183 HYPERBARIC OXYGEN THERAPY: CPT | Mod: ,,, | Performed by: FAMILY MEDICINE

## 2023-01-20 PROCEDURE — 20220 PR BONE BIOPSY,TROCAR/NEEDLE SUPERF: ICD-10-PCS | Mod: ,,, | Performed by: PODIATRIST

## 2023-01-20 PROCEDURE — 87077 CULTURE AEROBIC IDENTIFY: CPT | Performed by: PODIATRIST

## 2023-01-20 PROCEDURE — 88307 PR  SURG PATH,LEVEL V: ICD-10-PCS | Mod: 26,,, | Performed by: PATHOLOGY

## 2023-01-20 PROCEDURE — 87205 SMEAR GRAM STAIN: CPT | Performed by: PODIATRIST

## 2023-01-20 PROCEDURE — 20220 BONE BIOPSY TROCAR/NDL SUPFC: CPT | Mod: ,,, | Performed by: PODIATRIST

## 2023-01-20 PROCEDURE — 88307 TISSUE EXAM BY PATHOLOGIST: CPT | Mod: 26,,, | Performed by: PATHOLOGY

## 2023-01-20 PROCEDURE — G0277 HBOT, FULL BODY CHAMBER, 30M: HCPCS | Performed by: FAMILY MEDICINE

## 2023-01-20 PROCEDURE — 87186 SC STD MICRODIL/AGAR DIL: CPT | Performed by: PODIATRIST

## 2023-01-20 PROCEDURE — 20220 BONE BIOPSY TROCAR/NDL SUPFC: CPT | Performed by: PODIATRIST

## 2023-01-20 PROCEDURE — 99499 UNLISTED E&M SERVICE: CPT | Mod: ,,, | Performed by: PODIATRIST

## 2023-01-20 PROCEDURE — 99183 PR HYPERBARIC OXYGEN THERAPY ATTENDANCE/SUPERVISION, PER SESSION: ICD-10-PCS | Mod: ,,, | Performed by: FAMILY MEDICINE

## 2023-01-20 PROCEDURE — 88307 TISSUE EXAM BY PATHOLOGIST: CPT | Performed by: PATHOLOGY

## 2023-01-20 RX ORDER — DOXYCYCLINE HYCLATE 100 MG
100 TABLET ORAL 2 TIMES DAILY
Qty: 20 TABLET | Refills: 0 | Status: SHIPPED | OUTPATIENT
Start: 2023-01-20 | End: 2023-01-27

## 2023-01-20 NOTE — PROGRESS NOTES
01/20/23 0930   Hyperbaric Pre-Inspection   Mattress cleaned prior to treatment Yes   2 Patient Identifiers Verified Yes   Consent Obtained Yes   Cotton Gown Yes   Patient voided Yes   Drainage Bags Emptied Not applicable   Patient Pregnant Not applicable   Glasses, Jewelry, Makeup, etc. Removed Yes   Dentures, Hearing Aid, Prosthetic Devices Removed Yes   Touch hair to check for hair spray Yes   Cold/Flu Symptoms No   Diabetic Patient Eaten Yes   Medications Given Not applicable   Fears and apprehensions verbalized Yes   Ground Wire Secured Yes   HBO Treatment Course Details   Treatment Course Number 1   Ordering Provider Waylon SCHILLING   Indications Diabetic wounds of lower extremities   HBO Treatment Start Date 12/19/23   HBO Treatment Details   Treatment Number 20   Inpatient Visit No   Total Treatment Length Calc (minutes) 110   Chamber Type Monoplace   Chamber # 1   HBO Dive Log # [id number]   Treatment Protocol 2.0 SURI x 90 minutes w/100% oxygen and no air break   Treatment Details   Dive Rate Down 1.5 psi/minute   Dive Rate Up 2.0 psi/minute   Compress Begins 1 SURI   Clock Time 0943   Tx Pressure Reached 2 SURI   Clock Time 0953   Decompress Begins 2 SURI   Clock Time 1123   Decompress Ends 1 SURI   Clock Time 1133   Pre HBO Vital Signs   BP (!) 146/80   Pulse 93   Resp 16   Temp 97.7 °F (36.5 °C)   Blood Glucose 203   Glucose Meter # wc   Pain Level 0   Post HBO Vital Signs   BP (!) 172/85   Pulse 95   Resp 16   Blood Glucose 175   Glucose Meter # wc   Pain Level 0           Arrived awake and alert. ABC's intact. Ambulated without assistance. Cleared for treatment by Roberto SCHILLING Tolerated treatment without complaints. Cleared from chamber without incidents. Follow up Monday for HBO.         ICD-10-CM ICD-9-CM   1. Chronic osteomyelitis of right foot  M86.671 730.17   2. Acute on chronic osteomyelitis  M86.10 730.00    M86.60 730.10   3. Diabetic ulcer of left midfoot associated with type 2 diabetes mellitus, with  bone involvement without evidence of necrosis  E11.621 250.80    L97.426 707.14   4. Diabetic ulcer of right midfoot associated with type 2 diabetes mellitus, with bone involvement without evidence of necrosis  E11.621 250.80    L97.416 707.14        Physician Supervision  I provided direct supervision and was immediately available to furnish assistance and direction throughout the performance of the procedure.    Emergency Response Team  A trained emergency response team and emergency services were available throughout procedure.

## 2023-01-20 NOTE — PROGRESS NOTES
[facility]                Progress Note    Subjective:       Patient ID: Indio Ryder Jr. is a 52 y.o. male.    Chief Complaint: Wound Check    Returns to clinic for evaluation and treatment of bilateral foot wounds. Walked to clinic unaided wearing CAM boot and Darco. No pain. No new pedal complaints    Wound Check    Review of Systems   Constitutional:  Negative for appetite change, chills, fatigue and fever.   HENT:  Negative for hearing loss.    Eyes:  Negative for photophobia and visual disturbance.   Respiratory:  Negative for cough, chest tightness, shortness of breath and wheezing.    Cardiovascular:  Positive for leg swelling. Negative for chest pain and palpitations.   Gastrointestinal:  Negative for constipation, diarrhea, nausea and vomiting.   Endocrine: Negative for cold intolerance and heat intolerance.   Genitourinary:  Negative for flank pain.   Musculoskeletal:  Positive for gait problem and myalgias. Negative for neck pain and neck stiffness.   Skin:  Positive for wound. Negative for color change.   Neurological:  Positive for numbness. Negative for weakness, light-headedness and headaches.        + paresthesia    Psychiatric/Behavioral:  Negative for sleep disturbance.        Objective:        Physical Exam  Constitutional:       Appearance: He is well-developed.   Cardiovascular:      Comments: Dorsalis pedis and posterior tibial pulses are palpable Skin is atrophic, slightly hyperpigmented, and mildly edematous      Musculoskeletal:         General: No tenderness. Normal range of motion.      Comments: Muscle strength is 5/5 in all groups bilaterally.    S/p partial 5th ray amp b/l    S/p hallux amp right    Fat pad atrophy to heels and met heads bilateral       Skin:     General: Skin is warm and dry.      Coloration: Skin is not jaundiced, mottled or sallow.      Findings: Wound (see below) present. No abscess or ecchymosis.      Comments:         Neurological:      Mental Status: He is alert and oriented to person, place, and time.      Comments: Humphreys-Nenita 5.07 monofilamant testing is diminished Kenji feet. Sharp/dull sensation diminished Bilaterally. Light touch absent Bilaterally.       Psychiatric:         Behavior: Behavior normal.       Vitals:    01/20/23 1123   BP: (!) 187/98   Pulse: 80   Temp: 97.7 °F (36.5 °C)       Assessment:           ICD-10-CM ICD-9-CM   1. Acute on chronic osteomyelitis  M86.10 730.00    M86.60 730.10   2. Diabetic ulcer of left midfoot associated with type 2 diabetes mellitus, with bone involvement without evidence of necrosis  E11.621 250.80    L97.426 707.14   3. Diabetic ulcer of right midfoot associated with diabetes mellitus due to underlying condition, with fat layer exposed  E08.621 249.80    L97.412 707.14   4. Diabetic ulcer of left foot  E11.621 250.80    L97.529 707.15       [REMOVED]      (Retired) Wound 05/06/22 1142 Diabetic Ulcer Right plantar Foot (Removed)   05/06/22 1142    Pre-existing: Yes   Primary Wound Type: Diabetic ulc   Side: Right   Orientation: plantar   Location: Foot   Wound Number:    Ankle-Brachial Index:    Pulses:    Removal Indication and Assessment:    Wound Outcome: Healed   (Retired) Wound Type:    (Retired) Wound Length (cm):    (Retired) Wound Width (cm):    (Retired) Depth (cm):    Wound Description (Comments):    Removal Indications:    Removed 01/20/23 1137   Wound Image    01/20/23 1137   Wound WDL ex 01/20/23 1137   Dressing Appearance Dry;Intact 01/20/23 1137   Drainage Amount Moderate 01/20/23 1137   Drainage Characteristics/Odor Creamy;Serosanguineous;No odor 01/20/23 1137   Appearance Red 01/20/23 1137   Tissue loss description Partial thickness 01/20/23 1137   Red (%), Wound Tissue Color 100 % 01/20/23 1137   Periwound Area Intact 01/20/23 1137   Wound Length (cm) 0.7 cm 01/20/23 1137   Wound Width (cm) 1.5 cm 01/20/23 1137   Wound Depth (cm) 1.5 cm 01/20/23 1137    Wound Volume (cm^3) 1.575 cm^3 01/20/23 1137   Wound Surface Area (cm^2) 1.05 cm^2 01/20/23 1137   Tunneling (depth (cm)/location) 4 at 3 oclock 01/20/23 1137   Care Cleansed with:;Antimicrobial agent;Sterile normal saline 01/20/23 1137   Dressing Changed 01/20/23 1137   Off Loading Football dressing;Off loading shoe 01/20/23 1137   Dressing Change Due 01/27/23 01/20/23 1137           Plan:            Right plantar continues to be healed and safety football placed.     Left foot has no odor is same size but thick creamy serous/sang drainage from wound.  New 4 cm tunnel up toward dorsal foot 15 min vashe soak pre procedure.     BONE BIOPSY OPERATIVE REPORT     SURGEON: Marivel Peterson DPM     Surgery Date: 01/20/2023    Indications: Non-healing foot ulceration in the presence of OM of the left midfoot    PRE-OP: DIAGNOSIS: Diabetic neurotrophic ulceration with MRI confirmed osteomyelitis     POST-OP DIAGNOSIS: Same    PROCEDURE: trochar biopsy of BONE left foot     ANESTHESIA: none required due to neuropathy    Estimated Blood Loss: minimal    PROCEDURE IN DETAIL:    The patient was seen in the  patient Room. The risks, benefits, complications, treatment options, and expected outcomes were discussed with the patient. The risks and potential complications of their problem and purposed treatment include but are not limited to infection, nerve injury, vascular injury, persistent pain, potential skin necrosis, deep vein thrombosis, possible pulmonary embolus, complications of the anesthetic.  The patient concurred with the proposed plan, giving informed consent.  The site of surgery properly noted/marked.    A Time Out was held and the above information confirmed.    Foot was scrubbed, prepped draped.     At this time attention was directed to the left plantar medial midfoot where a full thickness ulceration was noted.  A Jamshidi needle was utilized to take a plug of bone for culture and another for pathology. No  purulent drainage or abscess was found. Next, copious amounts of sterile normal saline were instilled into the wound     Short-term goals include maintaining good offloading and minimizing bioburden, promoting granulation and epithelialization to healing.   Long-term goals include keeping the wound healed by good offloading and medical management under the direction of internist.       Continue custom pads. Bleeding from bx resolved after 15 min before football applied.        Tissue pathology and/or culture taken     [x] Yes      [] No  Sharp debridement performed                   [] Yes       [x] No  Labs ordered     [x] Yes       [] No  Imaging ordered    [] Yes      [x] No    Orders Placed This Encounter   Procedures    Gram stain     Standing Status:   Standing     Number of Occurrences:   1    Change dressing     Custom pads to both plantar feet with hole cut over wound on left.  Left foot: Iodosorb to tunnel and Endoform to surface.  Gentian Violet to cici wound  Mextra and 2 long Abram foams in perpendicular fashion  Three cast padding Coban to both feet        Follow up in about 1 week (around 1/27/2023) for nurse visit Tuesday.

## 2023-01-23 ENCOUNTER — HOSPITAL ENCOUNTER (OUTPATIENT)
Dept: WOUND CARE | Facility: HOSPITAL | Age: 53
Discharge: HOME | End: 2023-01-23
Attending: INTERNAL MEDICINE
Payer: MEDICARE

## 2023-01-23 DIAGNOSIS — L97.426 DIABETIC ULCER OF LEFT MIDFOOT ASSOCIATED WITH TYPE 2 DIABETES MELLITUS, WITH BONE INVOLVEMENT WITHOUT EVIDENCE OF NECROSIS: ICD-10-CM

## 2023-01-23 DIAGNOSIS — M86.671 CHRONIC OSTEOMYELITIS OF RIGHT FOOT: Primary | ICD-10-CM

## 2023-01-23 DIAGNOSIS — E11.621 DIABETIC ULCER OF LEFT MIDFOOT ASSOCIATED WITH TYPE 2 DIABETES MELLITUS, WITH BONE INVOLVEMENT WITHOUT EVIDENCE OF NECROSIS: ICD-10-CM

## 2023-01-23 DIAGNOSIS — E11.621 DIABETIC ULCER OF RIGHT MIDFOOT ASSOCIATED WITH TYPE 2 DIABETES MELLITUS, WITH BONE INVOLVEMENT WITHOUT EVIDENCE OF NECROSIS: ICD-10-CM

## 2023-01-23 DIAGNOSIS — M86.10 ACUTE ON CHRONIC OSTEOMYELITIS: ICD-10-CM

## 2023-01-23 DIAGNOSIS — M86.60 ACUTE ON CHRONIC OSTEOMYELITIS: ICD-10-CM

## 2023-01-23 DIAGNOSIS — L97.416 DIABETIC ULCER OF RIGHT MIDFOOT ASSOCIATED WITH TYPE 2 DIABETES MELLITUS, WITH BONE INVOLVEMENT WITHOUT EVIDENCE OF NECROSIS: ICD-10-CM

## 2023-01-23 LAB
POCT GLUCOSE: 223 MG/DL (ref 70–110)
POCT GLUCOSE: 254 MG/DL (ref 70–110)

## 2023-01-23 PROCEDURE — 99183 HYPERBARIC OXYGEN THERAPY: CPT | Mod: ,,, | Performed by: INTERNAL MEDICINE

## 2023-01-23 PROCEDURE — 82962 GLUCOSE BLOOD TEST: CPT | Performed by: INTERNAL MEDICINE

## 2023-01-23 PROCEDURE — 99183 PR HYPERBARIC OXYGEN THERAPY ATTENDANCE/SUPERVISION, PER SESSION: ICD-10-PCS | Mod: ,,, | Performed by: INTERNAL MEDICINE

## 2023-01-23 PROCEDURE — G0277 HBOT, FULL BODY CHAMBER, 30M: HCPCS | Performed by: INTERNAL MEDICINE

## 2023-01-23 NOTE — PROGRESS NOTES
01/23/23 1000   Hyperbaric Pre-Inspection   Mattress cleaned prior to treatment Yes   2 Patient Identifiers Verified Yes   Consent Obtained Yes   Cotton Gown Yes   Patient voided Yes   Drainage Bags Emptied Not applicable   Patient Pregnant Not applicable   Glasses, Jewelry, Makeup, etc. Removed Yes   Dentures, Hearing Aid, Prosthetic Devices Removed Yes   Touch hair to check for hair spray Yes   Cold/Flu Symptoms No   Diabetic Patient Eaten Yes   Medications Given Not applicable   Fears and apprehensions verbalized Yes   Ground Wire Secured Yes   HBO Treatment Course Details   Treatment Course Number 1   Ordering Provider Waylon SCHILLING   Indications Diabetic wounds of lower extremities   HBO Treatment Start Date 12/19/23   HBO Treatment Details   Treatment Number 21   Inpatient Visit No   Total Treatment Length Calc (minutes) 110   Chamber Type Monoplace   Chamber # 2   HBO Dive Log # 1171   Treatment Protocol 2.0 SURI x 90 minutes w/100% oxygen and no air break   Treatment Details   Dive Rate Down 1.5 psi/minute   Dive Rate Up 2.0 psi/minute   Compress Begins 1 SURI   Clock Time 1031   Tx Pressure Reached 2 SURI   Clock Time 1041   Decompress Begins 2 SURI   Clock Time 1211   Decompress Ends 1 SURI   Clock Time 1221   Pre HBO Vital Signs   BP (!) 140/67   Pulse 95   Resp 16   Temp 97.7 °F (36.5 °C)   Blood Glucose 254   Glucose Meter # WC   Pain Level 0   Post HBO Vital Signs   BP (!) 177/90   Pulse 85   Resp 16   Blood Glucose 223   Glucose Meter # WC   Pain Level 0         Arrived awake and alert. ABC's intact. Ambulated without assistance. Cleared for treatment by Waylon SCHILLING Tolerated treatment without complaints. Cleared from chamber without incidents. Follow up tomorrow for HBO.       ICD-10-CM ICD-9-CM   1. Chronic osteomyelitis of right foot  M86.671 730.17   2. Acute on chronic osteomyelitis  M86.10 730.00    M86.60 730.10   3. Diabetic ulcer of left midfoot associated with type 2 diabetes mellitus, with  bone involvement without evidence of necrosis  E11.621 250.80    L97.426 707.14   4. Diabetic ulcer of right midfoot associated with type 2 diabetes mellitus, with bone involvement without evidence of necrosis  E11.621 250.80    L97.416 707.14        Physician Supervision  I provided direct supervision and was immediately available to furnish assistance and direction throughout the performance of the procedure.    Emergency Response Team  A trained emergency response team and emergency services were available throughout procedure.

## 2023-01-24 ENCOUNTER — HOSPITAL ENCOUNTER (OUTPATIENT)
Dept: WOUND CARE | Facility: HOSPITAL | Age: 53
Discharge: HOME | End: 2023-01-24
Attending: FAMILY MEDICINE
Payer: MEDICARE

## 2023-01-24 VITALS — HEART RATE: 86 BPM | TEMPERATURE: 98 F | DIASTOLIC BLOOD PRESSURE: 80 MMHG | SYSTOLIC BLOOD PRESSURE: 173 MMHG

## 2023-01-24 DIAGNOSIS — L97.416 DIABETIC ULCER OF RIGHT MIDFOOT ASSOCIATED WITH TYPE 2 DIABETES MELLITUS, WITH BONE INVOLVEMENT WITHOUT EVIDENCE OF NECROSIS: ICD-10-CM

## 2023-01-24 DIAGNOSIS — E11.621 DIABETIC ULCER OF LEFT FOOT: Primary | ICD-10-CM

## 2023-01-24 DIAGNOSIS — M86.10 ACUTE ON CHRONIC OSTEOMYELITIS: ICD-10-CM

## 2023-01-24 DIAGNOSIS — L97.529 DIABETIC ULCER OF LEFT FOOT: Primary | ICD-10-CM

## 2023-01-24 DIAGNOSIS — L97.426 DIABETIC ULCER OF LEFT MIDFOOT ASSOCIATED WITH TYPE 2 DIABETES MELLITUS, WITH BONE INVOLVEMENT WITHOUT EVIDENCE OF NECROSIS: ICD-10-CM

## 2023-01-24 DIAGNOSIS — E11.621 DIABETIC ULCER OF LEFT MIDFOOT ASSOCIATED WITH TYPE 2 DIABETES MELLITUS, WITH BONE INVOLVEMENT WITHOUT EVIDENCE OF NECROSIS: ICD-10-CM

## 2023-01-24 DIAGNOSIS — M86.60 ACUTE ON CHRONIC OSTEOMYELITIS: ICD-10-CM

## 2023-01-24 DIAGNOSIS — E11.621 DIABETIC ULCER OF RIGHT MIDFOOT ASSOCIATED WITH TYPE 2 DIABETES MELLITUS, WITH BONE INVOLVEMENT WITHOUT EVIDENCE OF NECROSIS: ICD-10-CM

## 2023-01-24 DIAGNOSIS — M86.671 CHRONIC OSTEOMYELITIS OF RIGHT FOOT: Primary | ICD-10-CM

## 2023-01-24 LAB
BACTERIA SPEC ANAEROBE CULT: NORMAL
POCT GLUCOSE: 123 MG/DL (ref 70–110)
POCT GLUCOSE: 124 MG/DL (ref 70–110)

## 2023-01-24 PROCEDURE — 99212 OFFICE O/P EST SF 10 MIN: CPT

## 2023-01-24 PROCEDURE — 82962 GLUCOSE BLOOD TEST: CPT | Mod: 91

## 2023-01-24 PROCEDURE — 82962 GLUCOSE BLOOD TEST: CPT | Performed by: FAMILY MEDICINE

## 2023-01-24 NOTE — PROGRESS NOTES
Infectious Disease Clinic Note  01/25/2023       Subjective:       Patient ID: Indio Ryder Jr. is a 52 y.o. male being seen for an new visit.    Chief Complaint: Wound Infection    HPI 52M withhx DM and numerous episodes of foot osteo presents for Lt foot osteomyelitis.    Of note, he was recently treated for Rt foot OM (cx + ESBL kleb and e faecalis) with meropenem x 6 wks; last day 12/6.     Pt has been following with podiatry for a non healing Lt foot ulcer that per pt, suddenly worsened. MRI  1/19 revealed: Extensive marrow edema involving the 4th TMT joint extending to the 2nd and 3rd TMT joints contiguous with the overlying soft tissue ulceration, concerning for osteomyelitis. Underwent bone biopsy of left trochar on 1/20. Bone culture is growing pseudomonas. Bone path is still pending. Has been on doxy since 1/20. Denies systemic signs or symptoms of infection.      Has been undergoing hyperbaric therapy since 12/2022. Had normal ALISSA 11/18/22.       Family History   Adopted: Yes   Problem Relation Age of Onset    Hypertension Mother     Cancer Mother         breast    Diabetes Mother     Hypertension Father     Cataracts Father     Heart disease Father         mi    Diabetes Sister     No Known Problems Brother     Heart disease Sister         MI    No Known Problems Sister     Hypertension Maternal Aunt     No Known Problems Maternal Uncle     No Known Problems Paternal Aunt     No Known Problems Paternal Uncle     No Known Problems Maternal Grandmother     No Known Problems Maternal Grandfather     No Known Problems Paternal Grandmother     No Known Problems Paternal Grandfather     Amblyopia Neg Hx     Blindness Neg Hx     Glaucoma Neg Hx     Macular degeneration Neg Hx     Retinal detachment Neg Hx     Strabismus Neg Hx     Stroke Neg Hx     Thyroid disease Neg Hx      Social History     Socioeconomic History    Marital status: Single    Number of children: 0   Occupational History    Occupation:  Retired     Employer: [employer]   Tobacco Use    Smoking status: Never    Smokeless tobacco: Never   Substance and Sexual Activity    Alcohol use: No    Drug use: No    Sexual activity: Yes     Partners: Female     Birth control/protection: Condom     Past Surgical History:   Procedure Laterality Date    COLONOSCOPY Right 1/19/2022    Procedure: COLONOSCOPY;  Surgeon: Tj Haile MD;  Location: [facility] ENDO (4TH FLR);  Service: Endoscopy;  Laterality: Right;  previous order un-usable/poor prep 1/18/22  RAPID COVID test arrival 12:20  instructions handed to pt- sm    COLONOSCOPY N/A 3/21/2022    Procedure: COLONOSCOPY;  Surgeon: Sheng Haque MD;  Location: [facility] ENDO (2ND FLR);  Service: Endoscopy;  Laterality: N/A;  Colonoscopy with AES for hepatix flexure polyp removal, 2nd floor  Pt is fully vaccinated-DS  Pt prescribed Plavix by Dr. Stahl in Jan. 2022, however pt states that he never started taking it-DS  3/16/22-Instructions sent via portal-DS    DEBRIDEMENT OF FOOT Left 7/14/2019    Procedure: DEBRIDEMENT, FOOT, with left 5th ray amputation;  Surgeon: Sis Hickman DPM;  Location: [facility] OR 2ND FLR;  Service: Podiatry;  Laterality: Left;    DEBRIDEMENT OF FOOT Left 7/17/2019    Procedure: DEBRIDEMENT, FOOT with leftt 5th ray partial amputation with Neox Graft;  Surgeon: Mai Burrell DPM;  Location: [facility] OR 2ND FLR;  Service: Podiatry;  Laterality: Left;  t    DEBRIDEMENT OF FOOT Bilateral 4/29/2021    Procedure: DEBRIDEMENT, FOOT;  Surgeon: Mai Burrell DPM;  Location: [facility] OR 1ST FLR;  Service: Podiatry;  Laterality: Bilateral;  Graft application    TOE AMPUTATION Right 06/05/2015    TOE AMPUTATION Right 01/19/2017    XI ROBOTIC COLECTOMY, RIGHT N/A 4/25/2022    Procedure: XI ROBOTIC COLECTOMY, RIGHT (lithotomy, eras low);  Surgeon: Erik Stout MD;  Location: [facility] OR 2ND FLR;  Service: Colon and Rectal;  Laterality: N/A;  Paruch to Goodwin    XI ROBOTIC COLECTOMY, RIGHT   "4/25/2022    Procedure: ;  Surgeon: Erik Stout MD;  Location: [facility] OR [address] [place] [address];  Service: Colon and Rectal;;       Patient's Medications   New Prescriptions    No medications on file   Previous Medications    ACETAMINOPHEN (TYLENOL) 325 MG TABLET    Take 2 tablets (650 mg total) by mouth every 6 (six) hours as needed.    ATORVASTATIN (LIPITOR) 80 MG TABLET    Take 1 tablet (80 mg total) by mouth once daily.    DIPHENOXYLATE-ATROPINE 2.5-0.025 MG (LOMOTIL) 2.5-0.025 MG PER TABLET    Take 1 to 2 tablets by mouth three times daily as needed for diarrhea    DOXYCYCLINE (VIBRA-TABS) 100 MG TABLET    Take 1 tablet (100 mg total) by mouth 2 (two) times daily.    EMPAGLIFLOZIN (JARDIANCE) 10 MG TABLET    Take 1 tablet (10 mg total) by mouth once daily.    FLUTICASONE PROPIONATE (FLONASE) 50 MCG/ACTUATION NASAL SPRAY    Use 2 sprays (100 mcg total) in each nostril once daily.    INSULIN ASPART U-100 (NOVOLOG) 100 UNIT/ML (3 ML) INPN PEN    Inject 8 units before meals plus scale 150-200+2, 201-250+4, 251-300+6, 301-350+8, >350+10. Max daily 54 units.    INSULIN DETEMIR U-100 (LEVEMIR FLEXTOUCH) 100 UNIT/ML (3 ML) SUBQ INPN PEN    Inject 26 Units into the skin every evening.    ONDANSETRON (ZOFRAN-ODT) 8 MG TBDL    Dissolve 1 tablet (8 mg total) by mouth every 8 (eight) hours as needed (Nausea).    ONETOUCH DELICA LANCETS 33 GAUGE [facility]        ONETOUCH ULTRA2 KIT        PANTOPRAZOLE (PROTONIX) 40 MG TABLET    Take 1 tablet (40 mg total) by mouth once daily.    PEN NEEDLE, DIABETIC (BD SASCHA 2ND GEN PEN NEEDLE) 32 GAUGE X 5/32" NDLE    Use 4 times a day    SEMAGLUTIDE (OZEMPIC) 1 MG/DOSE (4 MG/3 ML)    Inject 1 mg into the skin every 7 days.    TAMSULOSIN (FLOMAX) 0.4 MG CAP    Take 2 capsules (0.8 mg total) by mouth once daily. 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, with long-term current use of insulin 03/23/2021    Chronic left shoulder pain 07/30/2020    Allergic reaction due to antibacterial drug 04/05/2020    Hypertensive retinopathy, bilateral 01/28/2020    Diabetic ulcer of left midfoot associated with type 2 diabetes mellitus, with bone involvement without evidence of necrosis 08/02/2019    Severe malnutrition 07/22/2019    Diabetic ulcer of right midfoot associated with type 2 diabetes mellitus, with bone involvement without evidence of necrosis 07/12/2019    Neck pain 11/08/2017    Hypokalemia 05/30/2017    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   Respiratory:  Negative for cough and shortness of breath.    Cardiovascular:  Negative for chest pain and orthopnea.   Gastrointestinal:  Negative for abdominal pain, diarrhea and vomiting.   Genitourinary:  Negative for dysuria.   Musculoskeletal:  Negative for back pain and myalgias.        B/l foot wounds   Neurological:  Positive for sensory change. Negative for weakness.         Objective:      BP (!) 163/99   Pulse 94   Ht 6' 1" (1.854 m)   Wt 108.9 kg (240 lb 1.3 oz)   SpO2 99%   BMI 31.67 kg/m²   Estimated body mass index is 31.67 kg/m² as calculated " "from the following:    Height as of this encounter: 6' 1" (1.854 m).    Weight as of this encounter: 108.9 kg (240 lb 1.3 oz).    Physical Exam  Vitals and nursing note reviewed.   Constitutional:       Appearance: Normal appearance. He is not ill-appearing.   HENT:      Head: Normocephalic and atraumatic.      Nose: Nose normal. No congestion.      Mouth/Throat:      Mouth: Mucous membranes are moist.      Pharynx: Oropharynx is clear.   Eyes:      Conjunctiva/sclera: Conjunctivae normal.      Pupils: Pupils are equal, round, and reactive to light.   Cardiovascular:      Rate and Rhythm: Normal rate and regular rhythm.   Pulmonary:      Effort: Pulmonary effort is normal. No respiratory distress.      Breath sounds: Normal breath sounds.   Abdominal:      General: Abdomen is flat. There is no distension.      Palpations: Abdomen is soft.   Musculoskeletal:         General: No swelling. Normal range of motion.      Cervical back: Normal range of motion and neck supple.      Comments: Clean dressings in b/l feet  Images reviewed on media tab from day prior   Skin:     General: Skin is warm and dry.   Neurological:      General: No focal deficit present.      Mental Status: He is alert and oriented to person, place, and time.   Psychiatric:         Mood and Affect: Mood normal.         Behavior: Behavior normal.       Assessment:         1. Diabetic ulcer of left heel associated with type 2 diabetes mellitus, with fat layer exposed        2. Acute on chronic osteomyelitis              Plan:       There are no diagnoses linked to this encounter.      Osteomyelitis Left foot  Worsening Lt plantar ulcer with deep tunneling concerning for OM. MRI revealed Extensive marrow edema involving the 4th TMT joint extending to the 2nd and 3rd TMT joints contiguous with the overlying soft tissue ulceration, concerning for osteomyelitis. S/p bone biopsy of left trochar on 1/20.  Bone path is still pendin, but bone cx is growing " pseudomonas. Back in 12/10/21 cx grew pseudomonas resistant to cipro. Anticipate pt will need PICC line placement for IV abx. Currently without systemic signs of infection.    Plan:  --f/u final cx results and susceptibilities of pseudomonas; if R to fluoroquinolones will need PICC line placement for IV antibiotics  --anticipate a 6 wk course of abx   --if requires IV abx will need weekly cbc, cmp, crp  --If no MRSA growth will stop doxycycline.  --continue local wound care and f/u with podiatry    RTC in 2 wks.    Maria Dolores Tan MD  Infectious Disease     Total professional time spent for the encounter: 30 minutes  Time was spent preparing to see the patient, reviewing results of prior testing, obtaining and/or reviewing separately obtained history, performing a medically appropriate examination and interview, counseling and educating the patient/family, ordering medications/tests/procedures, referring and communicating with other health care professionals, documenting clinical information in the electronic health record, and independently interpreting results.

## 2023-01-24 NOTE — PROGRESS NOTES
Arrived awake and alert. ABC's intact. Patient's glucose was 124 mg/dl. Pt drank juice and the repeat glucose was 123 mg/dl. Pt related he was going to go home and comeback tomorrow.    Follow up tomorrow for HBO.

## 2023-01-25 ENCOUNTER — OFFICE VISIT (OUTPATIENT)
Dept: INFECTIOUS DISEASES | Facility: CLINIC | Age: 53
End: 2023-01-25
Payer: MEDICARE

## 2023-01-25 ENCOUNTER — HOSPITAL ENCOUNTER (OUTPATIENT)
Dept: WOUND CARE | Facility: HOSPITAL | Age: 53
Discharge: HOME | End: 2023-01-25
Attending: FAMILY MEDICINE
Payer: MEDICARE

## 2023-01-25 VITALS
HEIGHT: 73 IN | SYSTOLIC BLOOD PRESSURE: 163 MMHG | WEIGHT: 240.06 LBS | BODY MASS INDEX: 31.82 KG/M2 | OXYGEN SATURATION: 99 % | DIASTOLIC BLOOD PRESSURE: 99 MMHG | HEART RATE: 94 BPM

## 2023-01-25 DIAGNOSIS — M86.10 ACUTE ON CHRONIC OSTEOMYELITIS: ICD-10-CM

## 2023-01-25 DIAGNOSIS — L97.426 DIABETIC ULCER OF LEFT MIDFOOT ASSOCIATED WITH TYPE 2 DIABETES MELLITUS, WITH BONE INVOLVEMENT WITHOUT EVIDENCE OF NECROSIS: ICD-10-CM

## 2023-01-25 DIAGNOSIS — M86.60 ACUTE ON CHRONIC OSTEOMYELITIS: ICD-10-CM

## 2023-01-25 DIAGNOSIS — E11.621 DIABETIC ULCER OF RIGHT MIDFOOT ASSOCIATED WITH TYPE 2 DIABETES MELLITUS, WITH BONE INVOLVEMENT WITHOUT EVIDENCE OF NECROSIS: ICD-10-CM

## 2023-01-25 DIAGNOSIS — E11.621 DIABETIC ULCER OF LEFT HEEL ASSOCIATED WITH TYPE 2 DIABETES MELLITUS, WITH FAT LAYER EXPOSED: Primary | ICD-10-CM

## 2023-01-25 DIAGNOSIS — M86.671 CHRONIC OSTEOMYELITIS OF RIGHT FOOT: Primary | ICD-10-CM

## 2023-01-25 DIAGNOSIS — L97.416 DIABETIC ULCER OF RIGHT MIDFOOT ASSOCIATED WITH TYPE 2 DIABETES MELLITUS, WITH BONE INVOLVEMENT WITHOUT EVIDENCE OF NECROSIS: ICD-10-CM

## 2023-01-25 DIAGNOSIS — E11.621 DIABETIC ULCER OF LEFT MIDFOOT ASSOCIATED WITH TYPE 2 DIABETES MELLITUS, WITH BONE INVOLVEMENT WITHOUT EVIDENCE OF NECROSIS: ICD-10-CM

## 2023-01-25 DIAGNOSIS — L97.422 DIABETIC ULCER OF LEFT HEEL ASSOCIATED WITH TYPE 2 DIABETES MELLITUS, WITH FAT LAYER EXPOSED: Primary | ICD-10-CM

## 2023-01-25 LAB
POCT GLUCOSE: 180 MG/DL (ref 70–110)
POCT GLUCOSE: 200 MG/DL (ref 70–110)

## 2023-01-25 PROCEDURE — 99999 PR PBB SHADOW E&M-EST. PATIENT-LVL III: ICD-10-PCS | Mod: PBBFAC,,,

## 2023-01-25 PROCEDURE — 99214 OFFICE O/P EST MOD 30 MIN: CPT | Mod: S$PBB,,, | Performed by: INTERNAL MEDICINE

## 2023-01-25 PROCEDURE — G0277 HBOT, FULL BODY CHAMBER, 30M: HCPCS | Performed by: FAMILY MEDICINE

## 2023-01-25 PROCEDURE — 82962 GLUCOSE BLOOD TEST: CPT | Performed by: FAMILY MEDICINE

## 2023-01-25 PROCEDURE — 99183 PR HYPERBARIC OXYGEN THERAPY ATTENDANCE/SUPERVISION, PER SESSION: ICD-10-PCS | Mod: ,,, | Performed by: FAMILY MEDICINE

## 2023-01-25 PROCEDURE — 99999 PR PBB SHADOW E&M-EST. PATIENT-LVL III: CPT | Mod: PBBFAC,,,

## 2023-01-25 PROCEDURE — 99214 PR OFFICE/OUTPT VISIT, EST, LEVL IV, 30-39 MIN: ICD-10-PCS | Mod: S$PBB,,, | Performed by: INTERNAL MEDICINE

## 2023-01-25 PROCEDURE — 99183 HYPERBARIC OXYGEN THERAPY: CPT | Mod: ,,, | Performed by: FAMILY MEDICINE

## 2023-01-25 PROCEDURE — 99213 OFFICE O/P EST LOW 20 MIN: CPT | Mod: PBBFAC

## 2023-01-25 NOTE — PROGRESS NOTES
01/25/23 1000   Hyperbaric Pre-Inspection   Mattress cleaned prior to treatment Yes   2 Patient Identifiers Verified Yes   Consent Obtained Yes   Cotton Gown Yes   Patient voided Yes   Drainage Bags Emptied Not applicable   Patient Pregnant Not applicable   Glasses, Jewelry, Makeup, etc. Removed Yes   Dentures, Hearing Aid, Prosthetic Devices Removed Yes   Touch hair to check for hair spray Yes   Cold/Flu Symptoms No   Diabetic Patient Eaten Yes   Medications Given Not applicable   Fears and apprehensions verbalized Yes   Ground Wire Secured Yes   HBO Treatment Course Details   Treatment Course Number 1   Ordering Provider Waylon SCHILLING   Indications Diabetic wounds of lower extremities   HBO Treatment Start Date 12/19/23   HBO Treatment Details   Treatment Number 22   Inpatient Visit No   Total Treatment Length Calc (minutes) 86   Chamber Type Monoplace   Chamber # 2   HBO Dive Log # 1174   Treatment Protocol 2.0 SURI x 90 minutes w/100% oxygen and no air break   Treatment Details   Dive Rate Down 1.5 psi/minute   Dive Rate Up 2.0 psi/minute   Compress Begins 1 SURI   Clock Time 1025   Tx Pressure Reached 2 SURI   Clock Time 1035   Decompress Begins 2 SURI   Clock Time 1141   Decompress Ends 1 SURI   Clock Time 1151   Pre HBO Vital Signs   /71   Pulse 92   Resp 16   Temp 97.7 °F (36.5 °C)   Blood Glucose 200   Glucose Meter # wc   Pain Level 0   Post HBO Vital Signs   BP (!) 175/95   Pulse 86   Resp 16   Blood Glucose 180   Glucose Meter # wc   Pain Level 0           Arrived awake and alert. ABC's intact. Ambulated with out assistance.  Cleared for treatment by Kira SCHILLING Tolerated treatment without complaints. Cleared from chamber without incidents. Follow up tomorrow for HBO.         ICD-10-CM ICD-9-CM   1. Chronic osteomyelitis of right foot  M86.671 730.17   2. Acute on chronic osteomyelitis  M86.10 730.00    M86.60 730.10   3. Diabetic ulcer of left midfoot associated with type 2 diabetes mellitus, with  bone involvement without evidence of necrosis  E11.621 250.80    L97.426 707.14   4. Diabetic ulcer of right midfoot associated with type 2 diabetes mellitus, with bone involvement without evidence of necrosis  E11.621 250.80    L97.416 707.14          Physician Supervision  I provided direct supervision and was immediately available to furnish assistance and direction throughout the performance of the procedure.    Emergency Response Team  A trained emergency response team and emergency services were available throughout procedure.

## 2023-01-26 ENCOUNTER — HOSPITAL ENCOUNTER (OUTPATIENT)
Dept: WOUND CARE | Facility: HOSPITAL | Age: 53
Discharge: HOME | End: 2023-01-26
Attending: FAMILY MEDICINE
Payer: MEDICARE

## 2023-01-26 DIAGNOSIS — M86.671 CHRONIC OSTEOMYELITIS OF RIGHT FOOT: Primary | ICD-10-CM

## 2023-01-26 DIAGNOSIS — E11.621 DIABETIC ULCER OF RIGHT MIDFOOT ASSOCIATED WITH TYPE 2 DIABETES MELLITUS, WITH BONE INVOLVEMENT WITHOUT EVIDENCE OF NECROSIS: ICD-10-CM

## 2023-01-26 DIAGNOSIS — L97.426 DIABETIC ULCER OF LEFT MIDFOOT ASSOCIATED WITH TYPE 2 DIABETES MELLITUS, WITH BONE INVOLVEMENT WITHOUT EVIDENCE OF NECROSIS: ICD-10-CM

## 2023-01-26 DIAGNOSIS — M86.60 ACUTE ON CHRONIC OSTEOMYELITIS: ICD-10-CM

## 2023-01-26 DIAGNOSIS — M86.10 ACUTE ON CHRONIC OSTEOMYELITIS: ICD-10-CM

## 2023-01-26 DIAGNOSIS — E11.621 DIABETIC ULCER OF LEFT MIDFOOT ASSOCIATED WITH TYPE 2 DIABETES MELLITUS, WITH BONE INVOLVEMENT WITHOUT EVIDENCE OF NECROSIS: ICD-10-CM

## 2023-01-26 DIAGNOSIS — L97.416 DIABETIC ULCER OF RIGHT MIDFOOT ASSOCIATED WITH TYPE 2 DIABETES MELLITUS, WITH BONE INVOLVEMENT WITHOUT EVIDENCE OF NECROSIS: ICD-10-CM

## 2023-01-26 LAB
POCT GLUCOSE: 182 MG/DL (ref 70–110)
POCT GLUCOSE: 212 MG/DL (ref 70–110)

## 2023-01-26 PROCEDURE — 82962 GLUCOSE BLOOD TEST: CPT | Mod: 91 | Performed by: FAMILY MEDICINE

## 2023-01-26 PROCEDURE — 99183 PR HYPERBARIC OXYGEN THERAPY ATTENDANCE/SUPERVISION, PER SESSION: ICD-10-PCS | Mod: ,,, | Performed by: FAMILY MEDICINE

## 2023-01-26 PROCEDURE — 99183 HYPERBARIC OXYGEN THERAPY: CPT | Mod: ,,, | Performed by: FAMILY MEDICINE

## 2023-01-26 PROCEDURE — G0277 HBOT, FULL BODY CHAMBER, 30M: HCPCS | Performed by: FAMILY MEDICINE

## 2023-01-26 NOTE — PROGRESS NOTES
01/26/23 1000   Hyperbaric Pre-Inspection   Mattress cleaned prior to treatment Yes   2 Patient Identifiers Verified Yes   Consent Obtained Yes   Cotton Gown Yes   Patient voided Yes   Drainage Bags Emptied Not applicable   Patient Pregnant Not applicable   Glasses, Jewelry, Makeup, etc. Removed Yes   Dentures, Hearing Aid, Prosthetic Devices Removed Yes   Touch hair to check for hair spray Yes   Cold/Flu Symptoms No   Diabetic Patient Eaten Yes   Medications Given Not applicable   Fears and apprehensions verbalized Yes   Ground Wire Secured Yes   HBO Treatment Course Details   Treatment Course Number 1   Ordering Provider Waylon SCHILLING   Indications Diabetic wounds of lower extremities   HBO Treatment Start Date 12/19/23   HBO Treatment Details   Treatment Number 23   Inpatient Visit No   Total Treatment Length Calc (minutes) 110   Chamber Type Monoplace   Chamber # 2   HBO Dive Log # 1176   Treatment Protocol 2.0 SURI x 90 minutes w/100% oxygen and no air break   Treatment Details   Dive Rate Down 1.5 psi/minute   Dive Rate Up 2.0 psi/minute   Compress Begins 1 SURI   Clock Time 1029   Tx Pressure Reached 2 SURI   Clock Time 1039   Decompress Begins 2 SURI   Clock Time 1209   Decompress Ends 1 SURI   Clock Time 1219   Pre HBO Vital Signs   /71   Pulse 95   Resp 16   Temp 97.7 °F (36.5 °C)   Blood Glucose 212   Glucose Meter # wc   Pain Level 0   Post HBO Vital Signs   BP (!) 170/87   Pulse 88   Resp 16   Blood Glucose 182   Glucose Meter # wc   Pain Level 0           Arrived awake and alert. ABC's intact. Ambulated without assistance. Cleared for treatment by Kira SCHILLING Tolerated treatment without complaints. Cleared from chamber without incidents. Follow up tomorrow for HBO.         ICD-10-CM ICD-9-CM   1. Chronic osteomyelitis of right foot  M86.671 730.17   2. Acute on chronic osteomyelitis  M86.10 730.00    M86.60 730.10   3. Diabetic ulcer of left midfoot associated with type 2 diabetes mellitus, with  bone involvement without evidence of necrosis  E11.621 250.80    L97.426 707.14   4. Diabetic ulcer of right midfoot associated with type 2 diabetes mellitus, with bone involvement without evidence of necrosis  E11.621 250.80    L97.416 707.14        Physician Supervision  I provided direct supervision and was immediately available to furnish assistance and direction throughout the performance of the procedure.    Emergency Response Team  A trained emergency response team and emergency services were available throughout procedure.

## 2023-01-27 ENCOUNTER — HOSPITAL ENCOUNTER (OUTPATIENT)
Dept: WOUND CARE | Facility: HOSPITAL | Age: 53
Discharge: HOME | End: 2023-01-27
Attending: PODIATRIST
Payer: MEDICARE

## 2023-01-27 ENCOUNTER — HOSPITAL ENCOUNTER (OUTPATIENT)
Dept: WOUND CARE | Facility: HOSPITAL | Age: 53
Discharge: HOME | End: 2023-01-27
Attending: FAMILY MEDICINE
Payer: MEDICARE

## 2023-01-27 VITALS — DIASTOLIC BLOOD PRESSURE: 84 MMHG | SYSTOLIC BLOOD PRESSURE: 174 MMHG

## 2023-01-27 DIAGNOSIS — L97.416 DIABETIC ULCER OF RIGHT MIDFOOT ASSOCIATED WITH TYPE 2 DIABETES MELLITUS, WITH BONE INVOLVEMENT WITHOUT EVIDENCE OF NECROSIS: ICD-10-CM

## 2023-01-27 DIAGNOSIS — M86.60 ACUTE ON CHRONIC OSTEOMYELITIS: ICD-10-CM

## 2023-01-27 DIAGNOSIS — E11.621 DIABETIC ULCER OF LEFT MIDFOOT ASSOCIATED WITH TYPE 2 DIABETES MELLITUS, WITH BONE INVOLVEMENT WITHOUT EVIDENCE OF NECROSIS: ICD-10-CM

## 2023-01-27 DIAGNOSIS — M86.671 CHRONIC OSTEOMYELITIS OF RIGHT FOOT: Primary | ICD-10-CM

## 2023-01-27 DIAGNOSIS — L97.426 DIABETIC ULCER OF LEFT MIDFOOT ASSOCIATED WITH TYPE 2 DIABETES MELLITUS, WITH BONE INVOLVEMENT WITHOUT EVIDENCE OF NECROSIS: ICD-10-CM

## 2023-01-27 DIAGNOSIS — M86.10 ACUTE ON CHRONIC OSTEOMYELITIS: ICD-10-CM

## 2023-01-27 DIAGNOSIS — E11.621 DIABETIC ULCER OF LEFT MIDFOOT ASSOCIATED WITH TYPE 2 DIABETES MELLITUS, WITH BONE INVOLVEMENT WITHOUT EVIDENCE OF NECROSIS: Primary | ICD-10-CM

## 2023-01-27 DIAGNOSIS — E11.621 DIABETIC ULCER OF RIGHT MIDFOOT ASSOCIATED WITH TYPE 2 DIABETES MELLITUS, WITH BONE INVOLVEMENT WITHOUT EVIDENCE OF NECROSIS: ICD-10-CM

## 2023-01-27 DIAGNOSIS — L97.426 DIABETIC ULCER OF LEFT MIDFOOT ASSOCIATED WITH TYPE 2 DIABETES MELLITUS, WITH BONE INVOLVEMENT WITHOUT EVIDENCE OF NECROSIS: Primary | ICD-10-CM

## 2023-01-27 LAB
FINAL PATHOLOGIC DIAGNOSIS: NORMAL
Lab: NORMAL
POCT GLUCOSE: 209 MG/DL (ref 70–110)
POCT GLUCOSE: 250 MG/DL (ref 70–110)

## 2023-01-27 PROCEDURE — 11043 WOUND DEBRIDEMENT: ICD-10-PCS | Mod: ,,, | Performed by: PODIATRIST

## 2023-01-27 PROCEDURE — 99183 PR HYPERBARIC OXYGEN THERAPY ATTENDANCE/SUPERVISION, PER SESSION: ICD-10-PCS | Mod: ,,, | Performed by: FAMILY MEDICINE

## 2023-01-27 PROCEDURE — 87070 CULTURE OTHR SPECIMN AEROBIC: CPT | Performed by: PODIATRIST

## 2023-01-27 PROCEDURE — 87077 CULTURE AEROBIC IDENTIFY: CPT | Performed by: PODIATRIST

## 2023-01-27 PROCEDURE — 87186 SC STD MICRODIL/AGAR DIL: CPT | Performed by: PODIATRIST

## 2023-01-27 PROCEDURE — 11043 DBRDMT MUSC&/FSCA 1ST 20/<: CPT | Mod: ,,, | Performed by: PODIATRIST

## 2023-01-27 PROCEDURE — 99499 NO LOS: ICD-10-PCS | Mod: ,,, | Performed by: PODIATRIST

## 2023-01-27 PROCEDURE — 87075 CULTR BACTERIA EXCEPT BLOOD: CPT | Performed by: PODIATRIST

## 2023-01-27 PROCEDURE — 99499 UNLISTED E&M SERVICE: CPT | Mod: ,,, | Performed by: PODIATRIST

## 2023-01-27 PROCEDURE — 87205 SMEAR GRAM STAIN: CPT | Performed by: PODIATRIST

## 2023-01-27 PROCEDURE — G0277 HBOT, FULL BODY CHAMBER, 30M: HCPCS | Performed by: FAMILY MEDICINE

## 2023-01-27 PROCEDURE — 11043 DBRDMT MUSC&/FSCA 1ST 20/<: CPT | Performed by: PODIATRIST

## 2023-01-27 PROCEDURE — 99183 HYPERBARIC OXYGEN THERAPY: CPT | Mod: ,,, | Performed by: FAMILY MEDICINE

## 2023-01-27 PROCEDURE — 82962 GLUCOSE BLOOD TEST: CPT | Mod: 91 | Performed by: FAMILY MEDICINE

## 2023-01-27 NOTE — PROGRESS NOTES
[facility]                Progress Note    Subjective:       Patient ID: Indio Ryder Jr. is a 52 y.o. male.    Chief Complaint: Wound Check    Follow up wound care visit. Patient ambulated to exam room with prescribed Darco on Right Foot and CAM boot on LLE. No c/o pain at present. Denies fever, chills or flu-like symptoms. Dressing's intact with a large amount of purulent, non-malodor drainage from Left Foot wound      Review of Systems   Constitutional:  Negative for appetite change, chills, fatigue and fever.   HENT:  Negative for hearing loss.    Eyes:  Negative for photophobia and visual disturbance.   Respiratory:  Negative for cough, chest tightness, shortness of breath and wheezing.    Cardiovascular:  Positive for leg swelling. Negative for chest pain and palpitations.   Gastrointestinal:  Negative for constipation, diarrhea, nausea and vomiting.   Endocrine: Negative for cold intolerance and heat intolerance.   Genitourinary:  Negative for flank pain.   Musculoskeletal:  Positive for gait problem and myalgias. Negative for neck pain and neck stiffness.   Skin:  Positive for wound. Negative for color change.   Neurological:  Positive for numbness. Negative for weakness, light-headedness and headaches.        + paresthesia    Psychiatric/Behavioral:  Negative for sleep disturbance.        Objective:        Physical Exam  Constitutional:       Appearance: He is well-developed.   Cardiovascular:      Comments: Dorsalis pedis and posterior tibial pulses are palpable Skin is atrophic, slightly hyperpigmented, and mildly edematous      Musculoskeletal:         General: No tenderness. Normal range of motion.      Comments: Muscle strength is 5/5 in all groups bilaterally.    S/p partial 5th ray amp b/l    S/p hallux amp right    Fat pad atrophy to heels and met heads bilateral       Skin:     General: Skin is warm and dry.      Coloration: Skin is not jaundiced,  mottled or sallow.      Findings: Wound (see below) present. No abscess or ecchymosis.      Comments:        Neurological:      Mental Status: He is alert and oriented to person, place, and time.      Comments: Winner-Nenita 5.07 monofilamant testing is diminished Kenji feet. Sharp/dull sensation diminished Bilaterally. Light touch absent Bilaterally.       Psychiatric:         Behavior: Behavior normal.       Vitals:    01/27/23 1147   BP: (!) 174/84       Assessment:           ICD-10-CM ICD-9-CM   1. Diabetic ulcer of left midfoot associated with type 2 diabetes mellitus, with bone involvement without evidence of necrosis  E11.621 250.80    L97.426 707.14   2. Acute on chronic osteomyelitis  M86.10 730.00    M86.60 730.10            Wound 04/08/22 1137 Diabetic Ulcer Left plantar;medial Foot (Active)   04/08/22 1137    Pre-existing: Yes   Primary Wound Type: Diabetic ulc   Side: Left   Orientation: plantar;medial   Location: Foot   Wound Number:    Ankle-Brachial Index:    Pulses:    Removal Indication and Assessment:    Wound Outcome:    (Retired) Wound Type:    (Retired) Wound Length (cm):    (Retired) Wound Width (cm):    (Retired) Depth (cm):    Wound Description (Comments):    Removal Indications:    Wound Image    01/27/23 1658   Wound WDL ex 01/27/23 1658   Dressing Appearance Intact;Moist drainage 01/27/23 1658   Drainage Amount Large 01/27/23 1658   Drainage Characteristics/Odor Purulent;Creamy;No odor 01/27/23 1658   Appearance Red;Bleeding 01/27/23 1658   Tissue loss description Full thickness 01/27/23 1658   Black (%), Wound Tissue Color 0 % 01/27/23 1658   Red (%), Wound Tissue Color 100 % 01/27/23 1658   Yellow (%), Wound Tissue Color 0 % 01/27/23 1658   Periwound Area Pale white;Moist;Macerated 01/27/23 1658   Wound Edges Defined;Open 01/27/23 1658   Wound Length (cm) 1.2 cm 01/27/23 1658   Wound Width (cm) 2 cm 01/27/23 1658   Wound Depth (cm) 2 cm 01/27/23 1658   Wound Volume (cm^3) 4.8 cm^3  01/27/23 1658   Wound Surface Area (cm^2) 2.4 cm^2 01/27/23 1658   Tunneling (depth (cm)/location) 3 cm @ 3 o'clock 01/27/23 1658   Undermining (depth (cm)/location) 0 01/27/23 1658   Care Cleansed with:;Antimicrobial agent;Sterile normal saline;Debrided 01/27/23 1658   Dressing Changed 01/27/23 1658   Periwound Care Skin barrier film applied;Moisture barrier applied 01/27/23 1658   Compression Tubular elasticized bandage 01/27/23 1658   Off Loading Football dressing;Off loading shoe 01/27/23 1658   Dressing Change Due 01/31/23 01/27/23 1658           Plan:            Wound Debridement    Date/Time: 1/27/2023 8:42 AM  Performed by: Marivel Peterson DPM  Authorized by: Marivel Peterson DPM       Wound Details:    Location:  Left foot    Location:  Left Midfoot    Type of Debridement:  Excisional       Length (cm):  1.2       Area (sq cm):  2.4       Width (cm):  2       Percent Debrided (%):  100       Depth (cm):  2       Total Area Debrided (sq cm):  2.4    Depth of debridement:  Muscle/fascia/tendon    Tissue debrided:  Dermis, Epidermis, Subcutaneous, Fascia, Muscle and Tendon    Devitalized tissue debrided:  Callus    Instruments:  Forceps, Nippers and Scissors    Bleeding:  Minimal  Hemostasis Achieved: Yes    Method Used:  Pressure  Patient tolerance:  Patient tolerated the procedure well with no immediate complications     Deep cultures obtained    Left Plantar Foot wound is measuring larger overall. .      Wound care done as per order. Return to clinic in on Tuesday for a nurse visit, then 1 week to see MD.    Tissue pathology and/or culture taken     [x] Yes      [] No  Sharp debridement performed                   [x] Yes       [] No  Labs ordered     [] Yes       [x] No  Imaging ordered    [] Yes      [x] No    Orders Placed This Encounter   Procedures    Debridement     This order was created via procedure documentation     Standing Status:   Standing     Number of Occurrences:   1    Gram stain     Standing  Status:   Standing     Number of Occurrences:   1    Change dressing     Clean with NS. Vashe soak for 10 minutes (Left Foot).  RLE: Abram foam x 2, Football dressing (cast Padding x 3), Tubigrip single layer, size E folded over foot and secured with Medipore Tape. Darco Shoe.  Left Foot: Gentian Violet to cici wound. Cavilon/Benzoin plantar foot. Custom pad to plantar foot with (1 inch round hole cut over wound to allow for drainage). Pack wound with [place].   Mextra long x1. Abram foam long x 2 in perpendicular fashion. Football dressing (cast padding x3). Tubigrip single layer, size E folded over foot and secured with Medipore Tape. CAM boot.    Nurse Visit - Change Left Foot dressing only per MD.        Follow up in about 4 days (around 1/31/2023) for wound care.

## 2023-01-27 NOTE — PROGRESS NOTES
01/27/23 0900   Hyperbaric Pre-Inspection   Mattress cleaned prior to treatment Yes   2 Patient Identifiers Verified Yes   Consent Obtained Yes   Cotton Gown Yes   Patient voided Yes   Drainage Bags Emptied Not applicable   Patient Pregnant Not applicable   Glasses, Jewelry, Makeup, etc. Removed Yes   Dentures, Hearing Aid, Prosthetic Devices Removed Yes   Touch hair to check for hair spray Yes   Cold/Flu Symptoms No   Diabetic Patient Eaten Yes   Medications Given Not applicable   Fears and apprehensions verbalized Yes   Ground Wire Secured Yes   HBO Treatment Course Details   Treatment Course Number 1   Ordering Provider Waylon SCHILLING   Indications Diabetic wounds of lower extremities   HBO Treatment Start Date 12/19/23   HBO Treatment Details   Treatment Number 24   Inpatient Visit No   Total Treatment Length Calc (minutes) 110   Chamber Type Monoplace   Chamber # 2   HBO Dive Log # [id number]   Treatment Protocol 2.0 SURI x 90 minutes w/100% oxygen and no air break   Treatment Details   Dive Rate Down 1.5 psi/minute   Dive Rate Up 2.0 psi/minute   Compress Begins 1 SURI   Clock Time 0922   Tx Pressure Reached 2 SURI   Clock Time 0932   Decompress Begins 2 SURI   Clock Time 1102   Decompress Ends 1 SURI   Clock Time 1112   Pre HBO Vital Signs   BP (!) 149/83   Pulse 97   Resp 16   Temp 98 °F (36.7 °C)   Blood Glucose 250   Glucose Meter # wc   Pain Level 0   Post HBO Vital Signs   BP (!) 174/84   Pulse 94   Resp 16   Blood Glucose 209   Pain Level 0         Arrived awake and alert. ABC's intact. Ambulated without assistance. Cleared for treatment by Roberto SCHILLING Tolerated treatment without complaints. Cleared from chamber without incidents. Follow up Monday for HBO.           ICD-10-CM ICD-9-CM   1. Chronic osteomyelitis of right foot  M86.671 730.17   2. Acute on chronic osteomyelitis  M86.10 730.00    M86.60 730.10   3. Diabetic ulcer of left midfoot associated with type 2 diabetes mellitus, with bone involvement  without evidence of necrosis  E11.621 250.80    L97.426 707.14   4. Diabetic ulcer of right midfoot associated with type 2 diabetes mellitus, with bone involvement without evidence of necrosis  E11.621 250.80    L97.416 707.14          Physician Supervision  I provided direct supervision and was immediately available to furnish assistance and direction throughout the performance of the procedure.    Emergency Response Team  A trained emergency response team and emergency services were available throughout procedure.

## 2023-01-28 LAB
GRAM STN SPEC: NORMAL

## 2023-01-29 ENCOUNTER — TELEPHONE (OUTPATIENT)
Dept: INFECTIOUS DISEASES | Facility: HOSPITAL | Age: 53
End: 2023-01-29
Payer: MEDICARE

## 2023-01-29 DIAGNOSIS — E11.621 DIABETIC ULCER OF RIGHT MIDFOOT ASSOCIATED WITH TYPE 2 DIABETES MELLITUS, WITH MUSCLE INVOLVEMENT WITHOUT EVIDENCE OF NECROSIS: Primary | ICD-10-CM

## 2023-01-29 DIAGNOSIS — L97.415 DIABETIC ULCER OF RIGHT MIDFOOT ASSOCIATED WITH TYPE 2 DIABETES MELLITUS, WITH MUSCLE INVOLVEMENT WITHOUT EVIDENCE OF NECROSIS: Primary | ICD-10-CM

## 2023-01-29 RX ORDER — CIPROFLOXACIN 750 MG/1
750 TABLET, FILM COATED ORAL EVERY 12 HOURS
Qty: 84 TABLET | Refills: 0 | Status: SHIPPED | OUTPATIENT
Start: 2023-01-29 | End: 2023-03-10 | Stop reason: ALTCHOICE

## 2023-01-29 NOTE — TELEPHONE ENCOUNTER
Called pt regarding results. Bone path was positive for Osteomyelitis and bone cx grew pan-sen pseudomonas. Will send in script for cipro 750mg po BID. Potential side effects were discussed. Pt was advised to not take abx with dairy products, vitamins, anti acids.

## 2023-01-30 ENCOUNTER — HOSPITAL ENCOUNTER (OUTPATIENT)
Dept: WOUND CARE | Facility: HOSPITAL | Age: 53
Discharge: HOME | End: 2023-01-30
Attending: INTERNAL MEDICINE
Payer: MEDICARE

## 2023-01-30 DIAGNOSIS — M86.671 CHRONIC OSTEOMYELITIS OF RIGHT FOOT: Primary | ICD-10-CM

## 2023-01-30 DIAGNOSIS — L97.416 DIABETIC ULCER OF RIGHT MIDFOOT ASSOCIATED WITH TYPE 2 DIABETES MELLITUS, WITH BONE INVOLVEMENT WITHOUT EVIDENCE OF NECROSIS: ICD-10-CM

## 2023-01-30 DIAGNOSIS — M86.60 ACUTE ON CHRONIC OSTEOMYELITIS: ICD-10-CM

## 2023-01-30 DIAGNOSIS — M86.10 ACUTE ON CHRONIC OSTEOMYELITIS: ICD-10-CM

## 2023-01-30 DIAGNOSIS — L97.426 DIABETIC ULCER OF LEFT MIDFOOT ASSOCIATED WITH TYPE 2 DIABETES MELLITUS, WITH BONE INVOLVEMENT WITHOUT EVIDENCE OF NECROSIS: ICD-10-CM

## 2023-01-30 DIAGNOSIS — E11.621 DIABETIC ULCER OF LEFT FOOT: Primary | ICD-10-CM

## 2023-01-30 DIAGNOSIS — L97.529 DIABETIC ULCER OF LEFT FOOT: Primary | ICD-10-CM

## 2023-01-30 DIAGNOSIS — E11.621 DIABETIC ULCER OF RIGHT MIDFOOT ASSOCIATED WITH TYPE 2 DIABETES MELLITUS, WITH BONE INVOLVEMENT WITHOUT EVIDENCE OF NECROSIS: ICD-10-CM

## 2023-01-30 DIAGNOSIS — E11.621 DIABETIC ULCER OF LEFT MIDFOOT ASSOCIATED WITH TYPE 2 DIABETES MELLITUS, WITH BONE INVOLVEMENT WITHOUT EVIDENCE OF NECROSIS: ICD-10-CM

## 2023-01-30 LAB
POCT GLUCOSE: 210 MG/DL (ref 70–110)
POCT GLUCOSE: 259 MG/DL (ref 70–110)

## 2023-01-30 PROCEDURE — 99183 PR HYPERBARIC OXYGEN THERAPY ATTENDANCE/SUPERVISION, PER SESSION: ICD-10-PCS | Mod: ,,, | Performed by: INTERNAL MEDICINE

## 2023-01-30 PROCEDURE — 82962 GLUCOSE BLOOD TEST: CPT | Performed by: INTERNAL MEDICINE

## 2023-01-30 PROCEDURE — 99183 HYPERBARIC OXYGEN THERAPY: CPT | Mod: ,,, | Performed by: INTERNAL MEDICINE

## 2023-01-30 PROCEDURE — 99213 OFFICE O/P EST LOW 20 MIN: CPT

## 2023-01-30 PROCEDURE — G0277 HBOT, FULL BODY CHAMBER, 30M: HCPCS | Performed by: INTERNAL MEDICINE

## 2023-01-30 NOTE — PROGRESS NOTES
01/30/23 1000   Hyperbaric Pre-Inspection   Mattress cleaned prior to treatment Yes   2 Patient Identifiers Verified Yes   Consent Obtained Yes   Cotton Gown Yes   Patient voided Yes   Drainage Bags Emptied Not applicable   Patient Pregnant Not applicable   Glasses, Jewelry, Makeup, etc. Removed Yes   Dentures, Hearing Aid, Prosthetic Devices Removed Yes   Touch hair to check for hair spray Yes   Cold/Flu Symptoms No   Diabetic Patient Eaten Yes   Medications Given Not applicable   Fears and apprehensions verbalized Yes   Ground Wire Secured Yes   HBO Treatment Course Details   Treatment Course Number 1   Ordering Provider Waylon SCHILLING   Indications Diabetic wounds of lower extremities   HBO Treatment Start Date 12/19/23   HBO Treatment Details   Treatment Number 25   Inpatient Visit No   Total Treatment Length Calc (minutes) 110   Chamber Type Monoplace   Chamber # 2   HBO Dive Log # 1179   Treatment Protocol 2.0 SURI x 90 minutes w/100% oxygen and no air break   Treatment Details   Dive Rate Down 1.5 psi/minute   Dive Rate Up 2.0 psi/minute   Compress Begins 1 SURI   Clock Time 1041   Tx Pressure Reached 2 SURI   Clock Time 1051   Decompress Begins 2 SURI   Clock Time 1221   Decompress Ends 1 SURI   Clock Time 1231   Pre HBO Vital Signs   /65   Pulse 100   Resp 16   Temp 98.2 °F (36.8 °C)   Blood Glucose 259   Glucose Meter # wc   Pain Level 0   Post HBO Vital Signs   /82   Pulse 109   Resp 16   Blood Glucose 210   Glucose Meter # wc   Pain Level 0         Arrived awake and alert. ABC's intact. Ambulated without assistance. Cleared for treatment by Walyon SCHILLING Tolerated treatment without complaints. Cleared from chamber without incidents. Follow up tomorrow for HBO.           ICD-10-CM ICD-9-CM   1. Chronic osteomyelitis of right foot  M86.671 730.17   2. Acute on chronic osteomyelitis  M86.10 730.00    M86.60 730.10   3. Diabetic ulcer of left midfoot associated with type 2 diabetes mellitus, with  bone involvement without evidence of necrosis  E11.621 250.80    L97.426 707.14   4. Diabetic ulcer of right midfoot associated with type 2 diabetes mellitus, with bone involvement without evidence of necrosis  E11.621 250.80    L97.416 707.14        Physician Supervision  I provided direct supervision and was immediately available to furnish assistance and direction throughout the performance of the procedure.    Emergency Response Team  A trained emergency response team and emergency services were available throughout procedure.

## 2023-01-31 ENCOUNTER — HOSPITAL ENCOUNTER (OUTPATIENT)
Dept: WOUND CARE | Facility: HOSPITAL | Age: 53
Discharge: HOME | End: 2023-01-31
Attending: FAMILY MEDICINE
Payer: MEDICARE

## 2023-01-31 DIAGNOSIS — E11.621 DIABETIC ULCER OF LEFT MIDFOOT ASSOCIATED WITH TYPE 2 DIABETES MELLITUS, WITH BONE INVOLVEMENT WITHOUT EVIDENCE OF NECROSIS: ICD-10-CM

## 2023-01-31 DIAGNOSIS — L97.426 DIABETIC ULCER OF LEFT MIDFOOT ASSOCIATED WITH TYPE 2 DIABETES MELLITUS, WITH BONE INVOLVEMENT WITHOUT EVIDENCE OF NECROSIS: ICD-10-CM

## 2023-01-31 DIAGNOSIS — L97.416 DIABETIC ULCER OF RIGHT MIDFOOT ASSOCIATED WITH TYPE 2 DIABETES MELLITUS, WITH BONE INVOLVEMENT WITHOUT EVIDENCE OF NECROSIS: ICD-10-CM

## 2023-01-31 DIAGNOSIS — M86.10 ACUTE ON CHRONIC OSTEOMYELITIS: ICD-10-CM

## 2023-01-31 DIAGNOSIS — E11.621 DIABETIC ULCER OF RIGHT MIDFOOT ASSOCIATED WITH TYPE 2 DIABETES MELLITUS, WITH BONE INVOLVEMENT WITHOUT EVIDENCE OF NECROSIS: ICD-10-CM

## 2023-01-31 DIAGNOSIS — M86.671 CHRONIC OSTEOMYELITIS OF RIGHT FOOT: Primary | ICD-10-CM

## 2023-01-31 DIAGNOSIS — M86.60 ACUTE ON CHRONIC OSTEOMYELITIS: ICD-10-CM

## 2023-01-31 LAB
BACTERIA SPEC AEROBE CULT: ABNORMAL
BACTERIA SPEC ANAEROBE CULT: NORMAL
POCT GLUCOSE: 122 MG/DL (ref 70–110)
POCT GLUCOSE: 159 MG/DL (ref 70–110)
POCT GLUCOSE: 178 MG/DL (ref 70–110)

## 2023-01-31 PROCEDURE — G0277 HBOT, FULL BODY CHAMBER, 30M: HCPCS | Performed by: FAMILY MEDICINE

## 2023-01-31 PROCEDURE — 99183 PR HYPERBARIC OXYGEN THERAPY ATTENDANCE/SUPERVISION, PER SESSION: ICD-10-PCS | Mod: ,,, | Performed by: FAMILY MEDICINE

## 2023-01-31 PROCEDURE — 99183 HYPERBARIC OXYGEN THERAPY: CPT | Mod: ,,, | Performed by: FAMILY MEDICINE

## 2023-01-31 PROCEDURE — 82962 GLUCOSE BLOOD TEST: CPT | Performed by: FAMILY MEDICINE

## 2023-01-31 NOTE — PROGRESS NOTES
01/31/23 1000   Hyperbaric Pre-Inspection   Mattress cleaned prior to treatment Yes   2 Patient Identifiers Verified Yes   Consent Obtained Yes   Cotton Gown Yes   Patient voided Yes   Drainage Bags Emptied Not applicable   Patient Pregnant Not applicable   Glasses, Jewelry, Makeup, etc. Removed Yes   Dentures, Hearing Aid, Prosthetic Devices Removed Yes   Touch hair to check for hair spray Yes   Cold/Flu Symptoms No   Diabetic Patient Eaten Yes   Medications Given Not applicable   Fears and apprehensions verbalized Yes   Ground Wire Secured Yes   HBO Treatment Course Details   Treatment Course Number 1   Ordering Provider Waylon SCHILLING   Indications Diabetic wounds of lower extremities   HBO Treatment Start Date 12/19/23   HBO Treatment Details   Treatment Number 26   Inpatient Visit No   Total Treatment Length Calc (minutes) 108   Chamber Type Monoplace   Chamber # 2   HBO Dive Log # 1181   Treatment Protocol 2.0 SURI x 90 minutes w/100% oxygen and no air break   Treatment Details   Dive Rate Down 1.5 psi/minute   Dive Rate Up 2.0 psi/minute   Compress Begins 1 SURI   Clock Time 1055   Tx Pressure Reached 2 SURI   Clock Time 1105   Decompress Begins 2 SURI   Clock Time 1235   Decompress Ends 1 SURI   Clock Time 1243   Pre HBO Vital Signs   /68   Pulse 95   Resp 16   Blood Glucose 122   Glucose Meter # wc   Action Taken waited approx 30 mins and glucose inceased to 178mg/dl   Pain Level 0   Post HBO Vital Signs   /70   Pulse 95   Resp 16   Blood Glucose 159   Pain Level 0         Arrived awake and alert. ABC's intact. Ambulated without assistance. Cleared for treatment by Roberto SCHILLING Tolerated treatment without complaints. Cleared from chamber without incidents. Follow up tomorrow for HBO.         ICD-10-CM ICD-9-CM   1. Chronic osteomyelitis of right foot  M86.671 730.17   2. Acute on chronic osteomyelitis  M86.10 730.00    M86.60 730.10   3. Diabetic ulcer of left midfoot associated with type 2 diabetes  mellitus, with bone involvement without evidence of necrosis  E11.621 250.80    L97.426 707.14   4. Diabetic ulcer of right midfoot associated with type 2 diabetes mellitus, with bone involvement without evidence of necrosis  E11.621 250.80    L97.416 707.14          Physician Supervision  I provided direct supervision and was immediately available to furnish assistance and direction throughout the performance of the procedure.    Emergency Response Team  A trained emergency response team and emergency services were available throughout procedure.

## 2023-02-01 ENCOUNTER — HOSPITAL ENCOUNTER (OUTPATIENT)
Dept: WOUND CARE | Facility: HOSPITAL | Age: 53
Discharge: HOME | End: 2023-02-01
Attending: FAMILY MEDICINE
Payer: MEDICARE

## 2023-02-01 DIAGNOSIS — E11.621 DIABETIC ULCER OF RIGHT MIDFOOT ASSOCIATED WITH TYPE 2 DIABETES MELLITUS, WITH BONE INVOLVEMENT WITHOUT EVIDENCE OF NECROSIS: ICD-10-CM

## 2023-02-01 DIAGNOSIS — L97.416 DIABETIC ULCER OF RIGHT MIDFOOT ASSOCIATED WITH TYPE 2 DIABETES MELLITUS, WITH BONE INVOLVEMENT WITHOUT EVIDENCE OF NECROSIS: ICD-10-CM

## 2023-02-01 DIAGNOSIS — M86.671 CHRONIC OSTEOMYELITIS OF RIGHT FOOT: Primary | ICD-10-CM

## 2023-02-01 DIAGNOSIS — L97.426 DIABETIC ULCER OF LEFT MIDFOOT ASSOCIATED WITH TYPE 2 DIABETES MELLITUS, WITH BONE INVOLVEMENT WITHOUT EVIDENCE OF NECROSIS: ICD-10-CM

## 2023-02-01 DIAGNOSIS — E11.621 DIABETIC ULCER OF LEFT MIDFOOT ASSOCIATED WITH TYPE 2 DIABETES MELLITUS, WITH BONE INVOLVEMENT WITHOUT EVIDENCE OF NECROSIS: ICD-10-CM

## 2023-02-01 DIAGNOSIS — M86.60 ACUTE ON CHRONIC OSTEOMYELITIS: ICD-10-CM

## 2023-02-01 DIAGNOSIS — M86.10 ACUTE ON CHRONIC OSTEOMYELITIS: ICD-10-CM

## 2023-02-01 LAB
POCT GLUCOSE: 204 MG/DL (ref 70–110)
POCT GLUCOSE: 210 MG/DL (ref 70–110)

## 2023-02-01 PROCEDURE — 99183 HYPERBARIC OXYGEN THERAPY: CPT | Mod: ,,, | Performed by: FAMILY MEDICINE

## 2023-02-01 PROCEDURE — 82962 GLUCOSE BLOOD TEST: CPT | Mod: 91 | Performed by: FAMILY MEDICINE

## 2023-02-01 PROCEDURE — 99183 PR HYPERBARIC OXYGEN THERAPY ATTENDANCE/SUPERVISION, PER SESSION: ICD-10-PCS | Mod: ,,, | Performed by: FAMILY MEDICINE

## 2023-02-01 PROCEDURE — G0277 HBOT, FULL BODY CHAMBER, 30M: HCPCS | Performed by: FAMILY MEDICINE

## 2023-02-01 NOTE — PROGRESS NOTES
02/01/23 1000   Hyperbaric Pre-Inspection   Mattress cleaned prior to treatment Yes   2 Patient Identifiers Verified Yes   Consent Obtained Yes   Cotton Gown Yes   Patient voided Yes   Drainage Bags Emptied Not applicable   Patient Pregnant Not applicable   Glasses, Jewelry, Makeup, etc. Removed Yes   Dentures, Hearing Aid, Prosthetic Devices Removed Yes   Touch hair to check for hair spray Yes   Cold/Flu Symptoms No   Diabetic Patient Eaten Yes   Medications Given Not applicable   Fears and apprehensions verbalized Yes   Ground Wire Secured Yes   HBO Treatment Course Details   Treatment Course Number 1   Ordering Provider Waylon SCHILLING   Indications Diabetic wounds of lower extremities   HBO Treatment Start Date 12/19/23   HBO Treatment Details   Treatment Number 27   Inpatient Visit No   Total Treatment Length Calc (minutes) 108   Chamber Type Monoplace   Chamber # 2   HBO Dive Log # 1183   Treatment Protocol 2.0 SURI x 90 minutes w/100% oxygen and no air break   Treatment Details   Dive Rate Down 1.5 psi/minute   Dive Rate Up 2.0 psi/minute   Compress Begins 1 SURI   Clock Time 1020   Tx Pressure Reached 2 SURI   Clock Time 1030   Decompress Begins 2 SURI   Clock Time 1200   Decompress Ends 1 SURI   Clock Time 1208   Pre HBO Vital Signs   /65   Pulse 97   Resp 16   Temp 98.2 °F (36.8 °C)   Blood Glucose 201   Glucose Meter # wc   Pain Level 0   Post HBO Vital Signs   BP (!) 159/86   Pulse 91   Resp 16   Blood Glucose 210   Glucose Meter # wc   Pain Level 0           Arrived awake and alert. ABC's intact. Ambulated without assistance. Cleared for treatment by Jennifer SCHILLING Tolerated treatment without complaints. Cleared from chamber without incidents. Follow up tomorrow for HBO.         ICD-10-CM ICD-9-CM   1. Chronic osteomyelitis of right foot  M86.671 730.17   2. Acute on chronic osteomyelitis  M86.10 730.00    M86.60 730.10   3. Diabetic ulcer of left midfoot associated with type 2 diabetes mellitus, with  bone involvement without evidence of necrosis  E11.621 250.80    L97.426 707.14   4. Diabetic ulcer of right midfoot associated with type 2 diabetes mellitus, with bone involvement without evidence of necrosis  E11.621 250.80    L97.416 707.14        Physician Supervision  I provided direct supervision and was immediately available to furnish assistance and direction throughout the performance of the procedure.    Emergency Response Team  A trained emergency response team and emergency services were available throughout procedure.       I have seen the patient, reviewed the  [facility] tech's  assessment. I have personally interviewed and examined the patient at bedside and agree with the findings.   Ruth Sandoval MD   [facility] - [place] - Wound care

## 2023-02-02 ENCOUNTER — HOSPITAL ENCOUNTER (OUTPATIENT)
Dept: WOUND CARE | Facility: HOSPITAL | Age: 53
Discharge: HOME | End: 2023-02-02
Attending: FAMILY MEDICINE
Payer: MEDICARE

## 2023-02-02 DIAGNOSIS — E11.621 DIABETIC ULCER OF LEFT MIDFOOT ASSOCIATED WITH TYPE 2 DIABETES MELLITUS, WITH BONE INVOLVEMENT WITHOUT EVIDENCE OF NECROSIS: ICD-10-CM

## 2023-02-02 DIAGNOSIS — M86.10 ACUTE ON CHRONIC OSTEOMYELITIS: ICD-10-CM

## 2023-02-02 DIAGNOSIS — L97.416 DIABETIC ULCER OF RIGHT MIDFOOT ASSOCIATED WITH TYPE 2 DIABETES MELLITUS, WITH BONE INVOLVEMENT WITHOUT EVIDENCE OF NECROSIS: Primary | ICD-10-CM

## 2023-02-02 DIAGNOSIS — M86.671 CHRONIC OSTEOMYELITIS OF RIGHT FOOT: ICD-10-CM

## 2023-02-02 DIAGNOSIS — L97.426 DIABETIC ULCER OF LEFT MIDFOOT ASSOCIATED WITH TYPE 2 DIABETES MELLITUS, WITH BONE INVOLVEMENT WITHOUT EVIDENCE OF NECROSIS: ICD-10-CM

## 2023-02-02 DIAGNOSIS — E11.621 DIABETIC ULCER OF RIGHT MIDFOOT ASSOCIATED WITH TYPE 2 DIABETES MELLITUS, WITH BONE INVOLVEMENT WITHOUT EVIDENCE OF NECROSIS: Primary | ICD-10-CM

## 2023-02-02 DIAGNOSIS — M86.60 ACUTE ON CHRONIC OSTEOMYELITIS: ICD-10-CM

## 2023-02-02 LAB
BACTERIA SPEC AEROBE CULT: ABNORMAL
BACTERIA SPEC AEROBE CULT: ABNORMAL
POCT GLUCOSE: 197 MG/DL (ref 70–110)
POCT GLUCOSE: 217 MG/DL (ref 70–110)

## 2023-02-02 PROCEDURE — 99183 PR HYPERBARIC OXYGEN THERAPY ATTENDANCE/SUPERVISION, PER SESSION: ICD-10-PCS | Mod: ,,, | Performed by: FAMILY MEDICINE

## 2023-02-02 PROCEDURE — G0277 HBOT, FULL BODY CHAMBER, 30M: HCPCS | Performed by: FAMILY MEDICINE

## 2023-02-02 PROCEDURE — 82962 GLUCOSE BLOOD TEST: CPT | Mod: 91 | Performed by: FAMILY MEDICINE

## 2023-02-02 PROCEDURE — 99183 HYPERBARIC OXYGEN THERAPY: CPT | Mod: ,,, | Performed by: FAMILY MEDICINE

## 2023-02-02 NOTE — PROGRESS NOTES
02/02/23 1000   Hyperbaric Pre-Inspection   Mattress cleaned prior to treatment Yes   2 Patient Identifiers Verified Yes   Consent Obtained Yes   Cotton Gown Yes   Patient voided Yes   Drainage Bags Emptied Not applicable   Patient Pregnant Not applicable   Glasses, Jewelry, Makeup, etc. Removed Yes   Dentures, Hearing Aid, Prosthetic Devices Removed Yes   Touch hair to check for hair spray Yes   Cold/Flu Symptoms No   Diabetic Patient Eaten Yes   Medications Given Not applicable   Fears and apprehensions verbalized Yes   Ground Wire Secured Yes   HBO Treatment Course Details   Treatment Course Number 1   Ordering Provider Kira SCHILLING   Indications Diabetic wounds of lower extremities   HBO Treatment Start Date 12/19/23   HBO Treatment Details   Treatment Number 28   Inpatient Visit No   Total Treatment Length Calc (minutes) 108   Chamber Type Monoplace   Chamber # 2   HBO Dive Log # 1186   Treatment Protocol 2.0 SURI x 90 minutes w/100% oxygen and no air break   Treatment Details   Dive Rate Down 1.5 psi/minute   Dive Rate Up 2.0 psi/minute   Compress Begins 1 SURI   Clock Time 1007   Tx Pressure Reached 2 SURI   Clock Time 1017   Decompress Begins 2 SURI   Clock Time 1147   Decompress Ends 1 SURI   Clock Time 1155   Pre HBO Vital Signs   /60   Pulse 104   Resp 16   Temp 97.7 °F (36.5 °C)   Blood Glucose 217   Glucose Meter # wc   Pain Level 0   Post HBO Vital Signs   /64   Pulse 104   Resp 16   Blood Glucose 197   Glucose Meter # wc   Pain Level 0         Arrived awake and alert. ABC's intact. Ambulated without assistance. Cleared for treatment by Kira SCHILLING Tolerated treatment without complaints. Cleared from chamber without incidents. Follow up tomorrow for HBO.         ICD-10-CM ICD-9-CM   1. Diabetic ulcer of right midfoot associated with type 2 diabetes mellitus, with bone involvement without evidence of necrosis  E11.621 250.80    L97.416 707.14   2. Diabetic ulcer of left midfoot associated  with type 2 diabetes mellitus, with bone involvement without evidence of necrosis  E11.621 250.80    L97.426 707.14   3. Chronic osteomyelitis of right foot  M86.671 730.17   4. Acute on chronic osteomyelitis  M86.10 730.00    M86.60 730.10          Physician Supervision  I provided direct supervision and was immediately available to furnish assistance and direction throughout the performance of the procedure.    Emergency Response Team  A trained emergency response team and emergency services were available throughout procedure.

## 2023-02-03 ENCOUNTER — HOSPITAL ENCOUNTER (OUTPATIENT)
Dept: WOUND CARE | Facility: HOSPITAL | Age: 53
Discharge: HOME | End: 2023-02-03
Attending: PODIATRIST
Payer: MEDICARE

## 2023-02-03 ENCOUNTER — HOSPITAL ENCOUNTER (OUTPATIENT)
Dept: WOUND CARE | Facility: HOSPITAL | Age: 53
Discharge: HOME | End: 2023-02-03
Attending: FAMILY MEDICINE
Payer: MEDICARE

## 2023-02-03 VITALS — DIASTOLIC BLOOD PRESSURE: 86 MMHG | SYSTOLIC BLOOD PRESSURE: 147 MMHG | TEMPERATURE: 98 F | HEART RATE: 95 BPM

## 2023-02-03 DIAGNOSIS — L97.426 DIABETIC ULCER OF LEFT MIDFOOT ASSOCIATED WITH TYPE 2 DIABETES MELLITUS, WITH BONE INVOLVEMENT WITHOUT EVIDENCE OF NECROSIS: ICD-10-CM

## 2023-02-03 DIAGNOSIS — E11.621 DIABETIC ULCER OF LEFT MIDFOOT ASSOCIATED WITH TYPE 2 DIABETES MELLITUS, WITH BONE INVOLVEMENT WITHOUT EVIDENCE OF NECROSIS: ICD-10-CM

## 2023-02-03 DIAGNOSIS — M86.671 CHRONIC OSTEOMYELITIS OF RIGHT FOOT: Primary | ICD-10-CM

## 2023-02-03 DIAGNOSIS — M86.10 ACUTE ON CHRONIC OSTEOMYELITIS: ICD-10-CM

## 2023-02-03 DIAGNOSIS — M86.60 ACUTE ON CHRONIC OSTEOMYELITIS: ICD-10-CM

## 2023-02-03 DIAGNOSIS — L97.422 DIABETIC ULCER OF LEFT MIDFOOT ASSOCIATED WITH TYPE 2 DIABETES MELLITUS, WITH FAT LAYER EXPOSED: ICD-10-CM

## 2023-02-03 DIAGNOSIS — E11.621 DIABETIC ULCER OF RIGHT MIDFOOT ASSOCIATED WITH TYPE 2 DIABETES MELLITUS, WITH BONE INVOLVEMENT WITHOUT EVIDENCE OF NECROSIS: ICD-10-CM

## 2023-02-03 DIAGNOSIS — L97.416 DIABETIC ULCER OF RIGHT MIDFOOT ASSOCIATED WITH TYPE 2 DIABETES MELLITUS, WITH BONE INVOLVEMENT WITHOUT EVIDENCE OF NECROSIS: ICD-10-CM

## 2023-02-03 DIAGNOSIS — L97.529 DIABETIC ULCER OF LEFT FOOT: Primary | ICD-10-CM

## 2023-02-03 DIAGNOSIS — M86.671 CHRONIC OSTEOMYELITIS OF RIGHT FOOT: ICD-10-CM

## 2023-02-03 DIAGNOSIS — E11.621 DIABETIC ULCER OF LEFT MIDFOOT ASSOCIATED WITH TYPE 2 DIABETES MELLITUS, WITH FAT LAYER EXPOSED: ICD-10-CM

## 2023-02-03 DIAGNOSIS — E11.621 DIABETIC ULCER OF LEFT FOOT: Primary | ICD-10-CM

## 2023-02-03 LAB
POCT GLUCOSE: 173 MG/DL (ref 70–110)
POCT GLUCOSE: 203 MG/DL (ref 70–110)

## 2023-02-03 PROCEDURE — 99499 NO LOS: ICD-10-PCS | Mod: ,,, | Performed by: PODIATRIST

## 2023-02-03 PROCEDURE — 99183 PR HYPERBARIC OXYGEN THERAPY ATTENDANCE/SUPERVISION, PER SESSION: ICD-10-PCS | Mod: ,,, | Performed by: FAMILY MEDICINE

## 2023-02-03 PROCEDURE — 11042 DBRDMT SUBQ TIS 1ST 20SQCM/<: CPT

## 2023-02-03 PROCEDURE — 11042 DEBRIDEMENT: ICD-10-PCS | Mod: ,,, | Performed by: PODIATRIST

## 2023-02-03 PROCEDURE — 82962 GLUCOSE BLOOD TEST: CPT

## 2023-02-03 PROCEDURE — 99183 HYPERBARIC OXYGEN THERAPY: CPT | Mod: ,,, | Performed by: FAMILY MEDICINE

## 2023-02-03 PROCEDURE — 99499 UNLISTED E&M SERVICE: CPT | Mod: ,,, | Performed by: PODIATRIST

## 2023-02-03 PROCEDURE — G0277 HBOT, FULL BODY CHAMBER, 30M: HCPCS

## 2023-02-03 PROCEDURE — 11042 DBRDMT SUBQ TIS 1ST 20SQCM/<: CPT | Mod: ,,, | Performed by: PODIATRIST

## 2023-02-03 NOTE — PROGRESS NOTES
02/03/23 0900   Hyperbaric Pre-Inspection   Mattress cleaned prior to treatment Yes   2 Patient Identifiers Verified Yes   Consent Obtained Yes   Cotton Gown Yes   Patient voided Yes   Drainage Bags Emptied Not applicable   Patient Pregnant Not applicable   Glasses, Jewelry, Makeup, etc. Removed Yes   Dentures, Hearing Aid, Prosthetic Devices Removed Yes   Touch hair to check for hair spray Yes   Cold/Flu Symptoms No   Diabetic Patient Eaten Yes   Medications Given Not applicable   Ground Wire Secured Yes   HBO Treatment Course Details   Treatment Course Number 1   Ordering Provider Waylon SCHILLING   Indications Diabetic wounds of lower extremities   HBO Treatment Start Date 12/19/23   HBO Treatment Details   Treatment Number 29   Inpatient Visit No   Total Treatment Length Calc (minutes) 108   Chamber Type Monoplace   Chamber # 1   HBO Dive Log # 3338   Treatment Protocol 2.0 SURI x 90 minutes w/100% oxygen and no air break   Treatment Details   Dive Rate Down 1.5 psi/minute   Dive Rate Up 2.0 psi/minute   Compress Begins 1 SURI   Clock Time 0910   Tx Pressure Reached 2 SURI   Clock Time 0920   Decompress Begins 2 SURI   Clock Time 1050   Decompress Ends 1 SURI   Clock Time 1058   Pre HBO Vital Signs   /67   Pulse 95   Resp 16   Temp 97.7 °F (36.5 °C)   Blood Glucose 203   Glucose Meter # wc   Pain Level 0   Post HBO Vital Signs   BP (!) 147/86   Pulse 95   Resp 16   Blood Glucose 173   Glucose Meter # wc   Pain Level 0         Arrived awake and alert. ABC's intact. Ambulated without assistance. Cleared for treatment by Jennifer SCHILLING Tolerated treatment without complaints. Cleared from chamber without incidents. Follow up Monday for HBO.         ICD-10-CM ICD-9-CM   1. Chronic osteomyelitis of right foot  M86.671 730.17   2. Acute on chronic osteomyelitis  M86.10 730.00    M86.60 730.10   3. Diabetic ulcer of left midfoot associated with type 2 diabetes mellitus, with bone involvement without evidence of necrosis   E11.621 250.80    L97.426 707.14   4. Diabetic ulcer of right midfoot associated with type 2 diabetes mellitus, with bone involvement without evidence of necrosis  E11.621 250.80    L97.416 707.14          Physician Supervision  I provided direct supervision and was immediately available to furnish assistance and direction throughout the performance of the procedure.    Emergency Response Team  A trained emergency response team and emergency services were available throughout procedure.

## 2023-02-03 NOTE — PROGRESS NOTES
[facility]                Progress Note    Subjective:       Patient ID: Indio Ryder Jr. is a 52 y.o. male.    Chief Complaint: Wound Care    Returns to clinic for evaluation and treatment of foot wounds. He has been going to HBOT as ordered.  No new pedal complaint.      Review of Systems   Constitutional:  Negative for appetite change, chills, fatigue and fever.   HENT:  Negative for hearing loss.    Eyes:  Negative for photophobia and visual disturbance.   Respiratory:  Negative for cough, chest tightness, shortness of breath and wheezing.    Cardiovascular:  Positive for leg swelling. Negative for chest pain and palpitations.   Gastrointestinal:  Negative for constipation, diarrhea, nausea and vomiting.   Endocrine: Negative for cold intolerance and heat intolerance.   Genitourinary:  Negative for flank pain.   Musculoskeletal:  Positive for gait problem and myalgias. Negative for neck pain and neck stiffness.   Skin:  Positive for wound. Negative for color change.   Neurological:  Positive for numbness. Negative for weakness, light-headedness and headaches.        + paresthesia    Psychiatric/Behavioral:  Negative for sleep disturbance.        Objective:        Physical Exam  Constitutional:       Appearance: He is well-developed.   Cardiovascular:      Comments: Dorsalis pedis and posterior tibial pulses are palpable Skin is atrophic, slightly hyperpigmented, and mildly edematous      Musculoskeletal:         General: No tenderness. Normal range of motion.      Comments: Muscle strength is 5/5 in all groups bilaterally.    S/p partial 5th ray amp b/l    S/p hallux amp right    Fat pad atrophy to heels and met heads bilateral       Skin:     General: Skin is warm and dry.      Coloration: Skin is not jaundiced, mottled or sallow.      Findings: Wound (see below) present. No abscess or ecchymosis.      Comments:        Neurological:      Mental Status: He is  alert and oriented to person, place, and time.      Comments: [place] 5.07 monofilamant testing is diminished Kenji feet. Sharp/dull sensation diminished Bilaterally. Light touch absent Bilaterally.       Psychiatric:         Behavior: Behavior normal.       Vitals:    02/03/23 1133   BP: (!) 147/86   Pulse: 95   Temp: 97.7 °F (36.5 °C)       Assessment:           ICD-10-CM ICD-9-CM   1. Diabetic ulcer of left foot  E11.621 250.80    L97.529 707.15   2. Diabetic ulcer of left midfoot associated with type 2 diabetes mellitus, with fat layer exposed  E11.621 250.80    L97.422 707.14   3. Chronic osteomyelitis of right foot  M86.671 730.17            Wound 04/08/22 1137 Diabetic Ulcer Left plantar;medial Foot (Active)   04/08/22 1137    Pre-existing: Yes   Primary Wound Type: Diabetic ulc   Side: Left   Orientation: plantar;medial   Location: Foot   Wound Number:    Ankle-Brachial Index:    Pulses:    Removal Indication and Assessment:    Wound Outcome:    (Retired) Wound Type:    (Retired) Wound Length (cm):    (Retired) Wound Width (cm):    (Retired) Depth (cm):    Wound Description (Comments):    Removal Indications:    Wound Image   02/03/23 1238   Wound WDL ex 02/03/23 1238   Dressing Appearance Moist drainage 02/03/23 1238   Drainage Amount Small 02/03/23 1238   Drainage Characteristics/Odor Serosanguineous 02/03/23 1238   Appearance Intact 02/03/23 1238   Red (%), Wound Tissue Color 100 % 02/03/23 1238   Periwound Area Macerated 02/03/23 1238   Wound Edges Callused 02/03/23 1238   Wound Length (cm) 0.8 cm 02/03/23 1238   Wound Width (cm) 0.8 cm 02/03/23 1238   Wound Depth (cm) 0.5 cm 02/03/23 1238   Wound Volume (cm^3) 0.32 cm^3 02/03/23 1238   Wound Surface Area (cm^2) 0.64 cm^2 02/03/23 1238   Care Cleansed with:;Antimicrobial agent;Sterile normal saline 02/03/23 1238   Dressing Collagen;Foam;Absorptive Pad;Cast padding 02/03/23 1235   Periwound Care Skin barrier film applied;Moisture barrier applied  02/03/23 1238   Off Loading Football dressing;Off loading shoe 02/03/23 1238   Dressing Change Due 02/10/23 02/03/23 1238           Plan:            Wound cleansed with antimicrobial soap and NS.     Debridement    Date/Time: 2/3/2023 8:45 AM  Performed by: Marivel Peterson DPM  Authorized by: Marivel Peterson DPM       Wound Details:    Location:  Left foot    Location:  Left Midfoot       Length (cm):  0.8       Area (sq cm):  0.64       Width (cm):  0.8       Percent Debrided (%):  100       Depth (cm):  0.5       Total Area Debrided (sq cm):  0.64    Depth of debridement:  Subcutaneous tissue    Tissue debrided:  Dermis, Epidermis and Subcutaneous    Devitalized tissue debrided:  Callus    Instruments:  Curette    Bleeding:  Minimal  Hemostasis Achieved: Yes    Method Used:  Pressure  Patient tolerance:  Patient tolerated the procedure well with no immediate complications   No change in plan of care. Patient instructed to return to clinic for wound care in one week. Verbalizes that he understand.    Tissue pathology and/or culture taken     [] Yes      [x] No  Sharp debridement performed                   [x] Yes       [] No  Labs ordered     [] Yes       [x] No  Imaging ordered    [] Yes      [x] No    Orders Placed This Encounter   Procedures    Debridement     This order was created via procedure documentation     Standing Status:   Standing     Number of Occurrences:   1    Change dressing     Clean with NS. Vashe soak for 10 minutes (Left Foot).   RLE: Abram foam x 2, Football dressing (cast Padding x 3), Tubigrip single layer, size E folded over foot and secured with Medipore Tape. Darco Shoe.   Left Foot: Gentian Violet to cici wound. Cavilon/Benzoin plantar foot. Custom pad to plantar foot with (1 inch round hole cut over wound to allow for drainage). Pack wound with Floirda.   Mextra long x1. Abram foam long x 2 in perpendicular fashion. Football dressing (cast padding x3). Tubigrip single layer, size E  folded over foot and secured with Medipore Tape. CAM boot.     Nurse Visit - Change Left Foot dressing only per MD.        Follow up in about 1 week (around 2/10/2023) for wound care.

## 2023-02-06 ENCOUNTER — HOSPITAL ENCOUNTER (OUTPATIENT)
Dept: WOUND CARE | Facility: HOSPITAL | Age: 53
Discharge: HOME | End: 2023-02-06
Attending: INTERNAL MEDICINE
Payer: MEDICARE

## 2023-02-06 DIAGNOSIS — L97.416 DIABETIC ULCER OF RIGHT MIDFOOT ASSOCIATED WITH TYPE 2 DIABETES MELLITUS, WITH BONE INVOLVEMENT WITHOUT EVIDENCE OF NECROSIS: ICD-10-CM

## 2023-02-06 DIAGNOSIS — M86.10 ACUTE ON CHRONIC OSTEOMYELITIS: ICD-10-CM

## 2023-02-06 DIAGNOSIS — M86.671 CHRONIC OSTEOMYELITIS OF RIGHT FOOT: Primary | ICD-10-CM

## 2023-02-06 DIAGNOSIS — M86.60 ACUTE ON CHRONIC OSTEOMYELITIS: ICD-10-CM

## 2023-02-06 DIAGNOSIS — E11.621 DIABETIC ULCER OF RIGHT MIDFOOT ASSOCIATED WITH TYPE 2 DIABETES MELLITUS, WITH BONE INVOLVEMENT WITHOUT EVIDENCE OF NECROSIS: ICD-10-CM

## 2023-02-06 DIAGNOSIS — L97.426 DIABETIC ULCER OF LEFT MIDFOOT ASSOCIATED WITH TYPE 2 DIABETES MELLITUS, WITH BONE INVOLVEMENT WITHOUT EVIDENCE OF NECROSIS: ICD-10-CM

## 2023-02-06 DIAGNOSIS — E11.621 DIABETIC ULCER OF LEFT MIDFOOT ASSOCIATED WITH TYPE 2 DIABETES MELLITUS, WITH BONE INVOLVEMENT WITHOUT EVIDENCE OF NECROSIS: ICD-10-CM

## 2023-02-06 LAB
POCT GLUCOSE: 177 MG/DL (ref 70–110)
POCT GLUCOSE: 225 MG/DL (ref 70–110)

## 2023-02-06 PROCEDURE — 99183 HYPERBARIC OXYGEN THERAPY: CPT | Mod: ,,, | Performed by: INTERNAL MEDICINE

## 2023-02-06 PROCEDURE — 99183 PR HYPERBARIC OXYGEN THERAPY ATTENDANCE/SUPERVISION, PER SESSION: ICD-10-PCS | Mod: ,,, | Performed by: INTERNAL MEDICINE

## 2023-02-06 PROCEDURE — 82962 GLUCOSE BLOOD TEST: CPT | Performed by: INTERNAL MEDICINE

## 2023-02-06 NOTE — PROGRESS NOTES
02/06/23 0945   Hyperbaric Pre-Inspection   Mattress cleaned prior to treatment Yes   2 Patient Identifiers Verified Yes   Consent Obtained Yes   Cotton Gown Yes   Patient voided Yes   Drainage Bags Emptied Not applicable   Patient Pregnant Not applicable   Glasses, Jewelry, Makeup, etc. Removed Yes   Dentures, Hearing Aid, Prosthetic Devices Removed Yes   Touch hair to check for hair spray Yes   Cold/Flu Symptoms No   Diabetic Patient Eaten Yes   Medications Given Not applicable   Fears and apprehensions verbalized Yes   Ground Wire Secured Yes   HBO Treatment Course Details   Treatment Course Number 1   Ordering Provider Waylon SCHILLING   Indications Diabetic wounds of lower extremities   HBO Treatment Start Date 12/19/23   HBO Treatment Details   Treatment Number 30   Inpatient Visit No   Total Treatment Length Calc (minutes) 108   Chamber Type Monoplace   Chamber # 2   HBO Dive Log # 1188   Treatment Protocol 2.0 SURI x 90 minutes w/100% oxygen and no air break   Treatment Details   Dive Rate Down 1.5 psi/minute   Dive Rate Up 2.0 psi/minute   Compress Begins 1 SURI   Clock Time 1006   Tx Pressure Reached 2 SURI   Clock Time 1016   Decompress Begins 2 SURI   Clock Time 1146   Decompress Ends 1 SURI   Clock Time 1154   Pre HBO Vital Signs   /77   Pulse 94   Resp 16   Temp 98.1 °F (36.7 °C)   Blood Glucose 225   Glucose Meter # wc   Pain Level 0   Post HBO Vital Signs   BP (!) 169/79   Pulse 91   Resp 16   Blood Glucose 177   Glucose Meter # wc   Pain Level 0           Arrived awake and alert. ABC's intact. Ambulated without assistance. Cleared for treatment by Waylon SCHILLING Tolerated treatment without complaints. Cleared from chamber without incidents. Follow up tomorrow for HBO.         ICD-10-CM ICD-9-CM   1. Chronic osteomyelitis of right foot  M86.671 730.17   2. Acute on chronic osteomyelitis  M86.10 730.00    M86.60 730.10   3. Diabetic ulcer of right midfoot associated with type 2 diabetes mellitus, with  bone involvement without evidence of necrosis  E11.621 250.80    L97.416 707.14   4. Diabetic ulcer of left midfoot associated with type 2 diabetes mellitus, with bone involvement without evidence of necrosis  E11.621 250.80    L97.426 707.14        Physician Supervision  I provided direct supervision and was immediately available to furnish assistance and direction throughout the performance of the procedure.    Emergency Response Team  A trained emergency response team and emergency services were available throughout procedure.

## 2023-02-07 ENCOUNTER — HOSPITAL ENCOUNTER (OUTPATIENT)
Dept: WOUND CARE | Facility: HOSPITAL | Age: 53
Discharge: HOME | End: 2023-02-07
Attending: FAMILY MEDICINE
Payer: MEDICARE

## 2023-02-07 DIAGNOSIS — L97.426 DIABETIC ULCER OF LEFT MIDFOOT ASSOCIATED WITH TYPE 2 DIABETES MELLITUS, WITH BONE INVOLVEMENT WITHOUT EVIDENCE OF NECROSIS: ICD-10-CM

## 2023-02-07 DIAGNOSIS — E11.621 DIABETIC ULCER OF LEFT MIDFOOT ASSOCIATED WITH TYPE 2 DIABETES MELLITUS, WITH BONE INVOLVEMENT WITHOUT EVIDENCE OF NECROSIS: ICD-10-CM

## 2023-02-07 DIAGNOSIS — M86.60 ACUTE ON CHRONIC OSTEOMYELITIS: ICD-10-CM

## 2023-02-07 DIAGNOSIS — M86.10 ACUTE ON CHRONIC OSTEOMYELITIS: ICD-10-CM

## 2023-02-07 DIAGNOSIS — M86.671 CHRONIC OSTEOMYELITIS OF RIGHT FOOT: Primary | ICD-10-CM

## 2023-02-07 DIAGNOSIS — E11.621 DIABETIC ULCER OF RIGHT MIDFOOT ASSOCIATED WITH TYPE 2 DIABETES MELLITUS, WITH BONE INVOLVEMENT WITHOUT EVIDENCE OF NECROSIS: ICD-10-CM

## 2023-02-07 DIAGNOSIS — L97.416 DIABETIC ULCER OF RIGHT MIDFOOT ASSOCIATED WITH TYPE 2 DIABETES MELLITUS, WITH BONE INVOLVEMENT WITHOUT EVIDENCE OF NECROSIS: ICD-10-CM

## 2023-02-07 LAB
POCT GLUCOSE: 146 MG/DL (ref 70–110)
POCT GLUCOSE: 177 MG/DL (ref 70–110)

## 2023-02-07 PROCEDURE — 99183 HYPERBARIC OXYGEN THERAPY: CPT | Mod: ,,, | Performed by: FAMILY MEDICINE

## 2023-02-07 PROCEDURE — G0277 HBOT, FULL BODY CHAMBER, 30M: HCPCS | Performed by: FAMILY MEDICINE

## 2023-02-07 PROCEDURE — 99183 PR HYPERBARIC OXYGEN THERAPY ATTENDANCE/SUPERVISION, PER SESSION: ICD-10-PCS | Mod: ,,, | Performed by: FAMILY MEDICINE

## 2023-02-07 PROCEDURE — 82962 GLUCOSE BLOOD TEST: CPT | Mod: 91 | Performed by: FAMILY MEDICINE

## 2023-02-07 NOTE — PROGRESS NOTES
02/07/23 0950   Hyperbaric Pre-Inspection   Mattress cleaned prior to treatment Yes   2 Patient Identifiers Verified Yes   Consent Obtained Yes   Cotton Gown Yes   Patient voided Yes   Drainage Bags Emptied Not applicable   Patient Pregnant Not applicable   Glasses, Jewelry, Makeup, etc. Removed Yes   Dentures, Hearing Aid, Prosthetic Devices Removed Yes   Touch hair to check for hair spray Yes   Cold/Flu Symptoms No   Diabetic Patient Eaten Yes   Medications Given Not applicable   Fears and apprehensions verbalized Yes   Ground Wire Secured Yes   HBO Treatment Course Details   Treatment Course Number 1   Ordering Provider Waylon SCHILLING   Indications Diabetic wounds of lower extremities   HBO Treatment Start Date 12/19/23   HBO Treatment Details   Treatment Number 31   Inpatient Visit No   Total Treatment Length Calc (minutes) 108   Chamber Type Monoplace   Chamber # 2   HBO Dive Log # 1190   Treatment Protocol 2.0 SURI x 90 minutes w/100% oxygen and no air break   Treatment Details   Dive Rate Down 1.5 psi/minute   Dive Rate Up 2.0 psi/minute   Compress Begins 1 SURI   Clock Time 1010   Tx Pressure Reached 2 SURI   Clock Time 1020   Decompress Begins 2 SURI   Clock Time 1150   Decompress Ends 1 SURI   Clock Time 1158   Pre HBO Vital Signs   /74   Pulse 94   Resp 18   Temp 97.7 °F (36.5 °C)   Blood Glucose 177   Glucose Meter # wc   Pain Level 0   Post HBO Vital Signs   BP (!) 163/81   Pulse 90   Resp 16   Blood Glucose 146   Glucose Meter # wc   Pain Level 0           Arrived awake and alert. ABC's intact. Ambulated without assistance. Cleared for treatment by Roberto SCHILLING Tolerated treatment without complaints. Cleared from chamber without incidents. Follow up tomorrow for HBO.         ICD-10-CM ICD-9-CM   1. Chronic osteomyelitis of right foot  M86.671 730.17   2. Acute on chronic osteomyelitis  M86.10 730.00    M86.60 730.10   3. Diabetic ulcer of left midfoot associated with type 2 diabetes mellitus, with  bone involvement without evidence of necrosis  E11.621 250.80    L97.426 707.14   4. Diabetic ulcer of right midfoot associated with type 2 diabetes mellitus, with bone involvement without evidence of necrosis  E11.621 250.80    L97.416 707.14        Physician Supervision  I provided direct supervision and was immediately available to furnish assistance and direction throughout the performance of the procedure.    Emergency Response Team  A trained emergency response team and emergency services were available throughout procedure.

## 2023-02-07 NOTE — PROGRESS NOTES
Infectious Disease Clinic Note  02/08/2023       Subjective:       Patient ID: Indio Ryder Jr. is a 52 y.o. male being seen for an new visit.    Chief Complaint: Follow-up    HPI 52M withhx DM and numerous episodes of foot osteo presents for Lt foot osteomyelitis.    Of note, he was recently treated for Rt foot OM (cx + ESBL kleb and e faecalis) with meropenem x 6 wks; last day 12/6.     Pt has been following with podiatry for a non healing Lt foot ulcer that per pt, suddenly worsened. MRI  1/19 revealed: Extensive marrow edema involving the 4th TMT joint extending to the 2nd and 3rd TMT joints contiguous with the overlying soft tissue ulceration, concerning for osteomyelitis. Underwent bone biopsy of left trochar on 1/20. Bone culture from 1/20 grew pseudomonas. Bone path revealed OM.  Discussed results with patient and started on cipro x 6 wks on 1/29. Subsequent wound culture from 1/27 growing pseudomonas plus e faecalis (R to tetracycline/ S to amp). Reports he has been tolerating cipro and notes improvement in wound since starting tx. Denies signs or symptoms of systemic infection.     Has been undergoing hyperbaric therapy since 12/2022. Had normal ALISSA 11/18/22.       Family History   Adopted: Yes   Problem Relation Age of Onset    Hypertension Mother     Cancer Mother         breast    Diabetes Mother     Hypertension Father     Cataracts Father     Heart disease Father         mi    Diabetes Sister     No Known Problems Brother     Heart disease Sister         MI    No Known Problems Sister     Hypertension Maternal Aunt     No Known Problems Maternal Uncle     No Known Problems Paternal Aunt     No Known Problems Paternal Uncle     No Known Problems Maternal Grandmother     No Known Problems Maternal Grandfather     No Known Problems Paternal Grandmother     No Known Problems Paternal Grandfather     Amblyopia Neg Hx     Blindness Neg Hx     Glaucoma Neg Hx     Macular degeneration Neg Hx      Retinal detachment Neg Hx     Strabismus Neg Hx     Stroke Neg Hx     Thyroid disease Neg Hx      Social History     Socioeconomic History    Marital status: Single    Number of children: 0   Occupational History    Occupation: Retired     Employer: [employer]   Tobacco Use    Smoking status: Never    Smokeless tobacco: Never   Substance and Sexual Activity    Alcohol use: No    Drug use: No    Sexual activity: Yes     Partners: Female     Birth control/protection: Condom     Past Surgical History:   Procedure Laterality Date    COLONOSCOPY Right 1/19/2022    Procedure: COLONOSCOPY;  Surgeon: Tj Haile MD;  Location: [facility] ENDO (4TH FLR);  Service: Endoscopy;  Laterality: Right;  previous order un-usable/poor prep 1/18/22  RAPID COVID test arrival 12:20  instructions handed to pt- sm    COLONOSCOPY N/A 3/21/2022    Procedure: COLONOSCOPY;  Surgeon: Sheng Haque MD;  Location: [facility] ENDO (2ND FLR);  Service: Endoscopy;  Laterality: N/A;  Colonoscopy with AES for hepatix flexure polyp removal, 2nd floor  Pt is fully vaccinated-DS  Pt prescribed Plavix by Dr. Stahl in Jan. 2022, however pt states that he never started taking it-DS  3/16/22-Instructions sent via portal-DS    DEBRIDEMENT OF FOOT Left 7/14/2019    Procedure: DEBRIDEMENT, FOOT, with left 5th ray amputation;  Surgeon: Sis Hickman DPM;  Location: [facility] OR 2ND FLR;  Service: Podiatry;  Laterality: Left;    DEBRIDEMENT OF FOOT Left 7/17/2019    Procedure: DEBRIDEMENT, FOOT with leftt 5th ray partial amputation with Neox Graft;  Surgeon: Mai Burrell DPM;  Location: [facility] OR 2ND FLR;  Service: Podiatry;  Laterality: Left;  t    DEBRIDEMENT OF FOOT Bilateral 4/29/2021    Procedure: DEBRIDEMENT, FOOT;  Surgeon: Mai Burrell DPM;  Location: [facility] OR 1ST FLR;  Service: Podiatry;  Laterality: Bilateral;  Graft application    TOE AMPUTATION Right 06/05/2015    TOE AMPUTATION Right 01/19/2017    XI ROBOTIC COLECTOMY, RIGHT N/A  "4/25/2022    Procedure: XI ROBOTIC COLECTOMY, RIGHT (lithotomy, eras low);  Surgeon: Erik Stout MD;  Location: [facility] OR 2ND FLR;  Service: Colon and Rectal;  Laterality: N/A;  Paruch to Goodwin    XI ROBOTIC COLECTOMY, RIGHT  4/25/2022    Procedure: ;  Surgeon: Erik Stout MD;  Location: [facility] OR 2ND FLR;  Service: Colon and Rectal;;       Patient's Medications   New Prescriptions    AMOXICILLIN-CLAVULANATE 875-125MG (AUGMENTIN) 875-125 MG PER TABLET    Take 1 tablet by mouth every 12 (twelve) hours.   Previous Medications    ACETAMINOPHEN (TYLENOL) 325 MG TABLET    Take 2 tablets (650 mg total) by mouth every 6 (six) hours as needed.    ATORVASTATIN (LIPITOR) 80 MG TABLET    Take 1 tablet (80 mg total) by mouth once daily.    CIPROFLOXACIN HCL (CIPRO) 750 MG TABLET    Take 1 tablet (750 mg total) by mouth every 12 (twelve) hours.    DIPHENOXYLATE-ATROPINE 2.5-0.025 MG (LOMOTIL) 2.5-0.025 MG PER TABLET    Take 1 to 2 tablets by mouth three times daily as needed for diarrhea    EMPAGLIFLOZIN (JARDIANCE) 10 MG TABLET    Take 1 tablet (10 mg total) by mouth once daily.    FLUTICASONE PROPIONATE (FLONASE) 50 MCG/ACTUATION NASAL SPRAY    Use 2 sprays (100 mcg total) in each nostril once daily.    INSULIN ASPART U-100 (NOVOLOG) 100 UNIT/ML (3 ML) INPN PEN    Inject 8 units before meals plus scale 150-200+2, 201-250+4, 251-300+6, 301-350+8, >350+10. Max daily 54 units.    INSULIN DETEMIR U-100 (LEVEMIR FLEXTOUCH) 100 UNIT/ML (3 ML) SUBQ INPN PEN    Inject 26 Units into the skin every evening.    ONDANSETRON (ZOFRAN-ODT) 8 MG TBDL    Dissolve 1 tablet (8 mg total) by mouth every 8 (eight) hours as needed (Nausea).    ONETOUCH DELICA LANCETS 33 GAUGE [facility]        ONETOUCH ULTRA2 KIT        PANTOPRAZOLE (PROTONIX) 40 MG TABLET    Take 1 tablet (40 mg total) by mouth once daily.    PEN NEEDLE, DIABETIC (BD SASCHA 2ND GEN PEN NEEDLE) 32 GAUGE X 5/32" NDLE    Use 4 times a day    SEMAGLUTIDE (OZEMPIC) 1 MG/DOSE (4 " MG/3 ML)    Inject 1 mg into the skin every 7 days.    TAMSULOSIN (FLOMAX) 0.4 MG CAP    Take 2 capsules (0.8 mg total) by mouth once daily. 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, with long-term current use of insulin 03/23/2021    Chronic left shoulder pain 07/30/2020    Allergic reaction due to antibacterial drug 04/05/2020    Hypertensive retinopathy, bilateral 01/28/2020    Diabetic ulcer of left midfoot associated with type 2 diabetes mellitus, with bone involvement without evidence of necrosis 08/02/2019    Severe malnutrition 07/22/2019    Diabetic ulcer of right midfoot associated with type 2 diabetes mellitus, with bone involvement without evidence of necrosis 07/12/2019    Neck pain 11/08/2017    Hypokalemia 05/30/2017    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   Respiratory:  Negative for cough and shortness of breath.    Cardiovascular:  Negative for chest pain and orthopnea.   Gastrointestinal:  Negative for abdominal pain, diarrhea and vomiting.   Genitourinary:  Negative for dysuria.  "  Musculoskeletal:  Negative for back pain and myalgias.        B/l foot wounds   Neurological:  Positive for sensory change. Negative for weakness.         Objective:      BP (!) 145/83   Pulse 101   Temp 98.3 °F (36.8 °C)   Ht 6' 1" (1.854 m)   Wt 108.7 kg (239 lb 12 oz)   SpO2 99%   BMI 31.63 kg/m²   Estimated body mass index is 31.63 kg/m² as calculated from the following:    Height as of this encounter: 6' 1" (1.854 m).    Weight as of this encounter: 108.7 kg (239 lb 12 oz).    Physical Exam  Vitals and nursing note reviewed.   Constitutional:       Appearance: Normal appearance. He is not ill-appearing.   HENT:      Head: Normocephalic and atraumatic.      Nose: Nose normal. No congestion.      Mouth/Throat:      Mouth: Mucous membranes are moist.      Pharynx: Oropharynx is clear.   Eyes:      Conjunctiva/sclera: Conjunctivae normal.      Pupils: Pupils are equal, round, and reactive to light.   Cardiovascular:      Rate and Rhythm: Normal rate and regular rhythm.   Pulmonary:      Effort: Pulmonary effort is normal. No respiratory distress.      Breath sounds: Normal breath sounds.   Abdominal:      General: Abdomen is flat. There is no distension.      Palpations: Abdomen is soft.   Musculoskeletal:         General: No swelling. Normal range of motion.      Cervical back: Normal range of motion and neck supple.      Comments: Clean dressings in b/l feet  Images reviewed on media tab from day prior   Skin:     General: Skin is warm and dry.   Neurological:      General: No focal deficit present.      Mental Status: He is alert and oriented to person, place, and time.   Psychiatric:         Mood and Affect: Mood normal.         Behavior: Behavior normal.       Assessment:         1. Diabetic ulcer of left midfoot associated with type 2 diabetes mellitus, with bone involvement without evidence of necrosis                Plan:       Osteomyelitis Left foot  Worsening Lt plantar ulcer with deep tunneling " concerning for OM. MRI revealed Extensive marrow edema involving the 4th TMT joint extending to the 2nd and 3rd TMT joints contiguous with the overlying soft tissue ulceration, concerning for osteomyelitis. S/p bone biopsy of left trochar on 1/20.  Bone path positive for OM and bone cx grew pseudomonas. Culture from wound drainage 1/27 grew psa and e faecalis.     Plan:  --continue cipro x 6 wks (last day 3/12/23)  --start augmentin for e faecalis- unclear if colonizer, but has numerous past cx growing e faecalis, so will treat x 4 wks (shorter osteo course).  --continue local wound care and f/u with podiatry  --went over potential abx side effects  --monitor with cbc, cmp, crp      RTC in 2-3 wks.    Maria Dolores Tan MD  Infectious Disease     Total professional time spent for the encounter: 30 minutes  Time was spent preparing to see the patient, reviewing results of prior testing, obtaining and/or reviewing separately obtained history, performing a medically appropriate examination and interview, counseling and educating the patient/family, ordering medications/tests/procedures, referring and communicating with other health care professionals, documenting clinical information in the electronic health record, and independently interpreting results.

## 2023-02-08 ENCOUNTER — OFFICE VISIT (OUTPATIENT)
Dept: INFECTIOUS DISEASES | Facility: CLINIC | Age: 53
End: 2023-02-08
Payer: MEDICARE

## 2023-02-08 ENCOUNTER — HOSPITAL ENCOUNTER (OUTPATIENT)
Dept: WOUND CARE | Facility: HOSPITAL | Age: 53
Discharge: HOME | End: 2023-02-08
Attending: FAMILY MEDICINE
Payer: MEDICARE

## 2023-02-08 ENCOUNTER — PATIENT MESSAGE (OUTPATIENT)
Dept: INFECTIOUS DISEASES | Facility: CLINIC | Age: 53
End: 2023-02-08
Payer: MEDICARE

## 2023-02-08 VITALS
HEART RATE: 101 BPM | SYSTOLIC BLOOD PRESSURE: 145 MMHG | HEIGHT: 73 IN | WEIGHT: 239.75 LBS | DIASTOLIC BLOOD PRESSURE: 83 MMHG | TEMPERATURE: 98 F | BODY MASS INDEX: 31.78 KG/M2 | OXYGEN SATURATION: 99 %

## 2023-02-08 VITALS — SYSTOLIC BLOOD PRESSURE: 158 MMHG | DIASTOLIC BLOOD PRESSURE: 90 MMHG | TEMPERATURE: 98 F | HEART RATE: 94 BPM

## 2023-02-08 DIAGNOSIS — L97.529 DIABETIC ULCER OF LEFT FOOT: Primary | ICD-10-CM

## 2023-02-08 DIAGNOSIS — E11.621 DIABETIC ULCER OF LEFT FOOT: Primary | ICD-10-CM

## 2023-02-08 DIAGNOSIS — L97.426 DIABETIC ULCER OF LEFT MIDFOOT ASSOCIATED WITH TYPE 2 DIABETES MELLITUS, WITH BONE INVOLVEMENT WITHOUT EVIDENCE OF NECROSIS: Primary | ICD-10-CM

## 2023-02-08 DIAGNOSIS — E11.621 DIABETIC ULCER OF LEFT MIDFOOT ASSOCIATED WITH TYPE 2 DIABETES MELLITUS, WITH BONE INVOLVEMENT WITHOUT EVIDENCE OF NECROSIS: Primary | ICD-10-CM

## 2023-02-08 PROCEDURE — 99214 PR OFFICE/OUTPT VISIT, EST, LEVL IV, 30-39 MIN: ICD-10-PCS | Mod: S$PBB,,, | Performed by: INTERNAL MEDICINE

## 2023-02-08 PROCEDURE — 99999 PR PBB SHADOW E&M-EST. PATIENT-LVL IV: CPT | Mod: PBBFAC,,,

## 2023-02-08 PROCEDURE — 99214 OFFICE O/P EST MOD 30 MIN: CPT | Mod: PBBFAC,27

## 2023-02-08 PROCEDURE — 99214 OFFICE O/P EST MOD 30 MIN: CPT | Mod: S$PBB,,, | Performed by: INTERNAL MEDICINE

## 2023-02-08 PROCEDURE — 99213 OFFICE O/P EST LOW 20 MIN: CPT

## 2023-02-08 PROCEDURE — 99999 PR PBB SHADOW E&M-EST. PATIENT-LVL IV: ICD-10-PCS | Mod: PBBFAC,,,

## 2023-02-08 RX ORDER — AMOXICILLIN AND CLAVULANATE POTASSIUM 875; 125 MG/1; MG/1
1 TABLET, FILM COATED ORAL EVERY 12 HOURS
Qty: 28 TABLET | Refills: 0 | Status: SHIPPED | OUTPATIENT
Start: 2023-02-08 | End: 2023-03-08

## 2023-02-09 ENCOUNTER — HOSPITAL ENCOUNTER (OUTPATIENT)
Dept: WOUND CARE | Facility: HOSPITAL | Age: 53
Discharge: HOME | End: 2023-02-09
Attending: FAMILY MEDICINE
Payer: MEDICARE

## 2023-02-09 DIAGNOSIS — E11.621 DIABETIC ULCER OF RIGHT MIDFOOT ASSOCIATED WITH TYPE 2 DIABETES MELLITUS, WITH BONE INVOLVEMENT WITHOUT EVIDENCE OF NECROSIS: ICD-10-CM

## 2023-02-09 DIAGNOSIS — M86.671 CHRONIC OSTEOMYELITIS OF RIGHT FOOT: Primary | ICD-10-CM

## 2023-02-09 DIAGNOSIS — M86.60 ACUTE ON CHRONIC OSTEOMYELITIS: ICD-10-CM

## 2023-02-09 DIAGNOSIS — L97.416 DIABETIC ULCER OF RIGHT MIDFOOT ASSOCIATED WITH TYPE 2 DIABETES MELLITUS, WITH BONE INVOLVEMENT WITHOUT EVIDENCE OF NECROSIS: ICD-10-CM

## 2023-02-09 DIAGNOSIS — L97.426 DIABETIC ULCER OF LEFT MIDFOOT ASSOCIATED WITH TYPE 2 DIABETES MELLITUS, WITH BONE INVOLVEMENT WITHOUT EVIDENCE OF NECROSIS: ICD-10-CM

## 2023-02-09 DIAGNOSIS — E11.621 DIABETIC ULCER OF LEFT MIDFOOT ASSOCIATED WITH TYPE 2 DIABETES MELLITUS, WITH BONE INVOLVEMENT WITHOUT EVIDENCE OF NECROSIS: ICD-10-CM

## 2023-02-09 DIAGNOSIS — M86.10 ACUTE ON CHRONIC OSTEOMYELITIS: ICD-10-CM

## 2023-02-09 LAB
POCT GLUCOSE: 135 MG/DL (ref 70–110)
POCT GLUCOSE: 158 MG/DL (ref 70–110)

## 2023-02-09 PROCEDURE — G0277 HBOT, FULL BODY CHAMBER, 30M: HCPCS | Performed by: FAMILY MEDICINE

## 2023-02-09 PROCEDURE — 99183 HYPERBARIC OXYGEN THERAPY: CPT | Mod: ,,, | Performed by: FAMILY MEDICINE

## 2023-02-09 PROCEDURE — 99183 PR HYPERBARIC OXYGEN THERAPY ATTENDANCE/SUPERVISION, PER SESSION: ICD-10-PCS | Mod: ,,, | Performed by: FAMILY MEDICINE

## 2023-02-09 PROCEDURE — 82962 GLUCOSE BLOOD TEST: CPT | Performed by: FAMILY MEDICINE

## 2023-02-09 NOTE — PROGRESS NOTES
02/09/23 0100   Hyperbaric Pre-Inspection   Mattress cleaned prior to treatment Yes   2 Patient Identifiers Verified Yes   Consent Obtained Yes   Cotton Gown Yes   Patient voided Yes   Drainage Bags Emptied Not applicable   Patient Pregnant Not applicable   Glasses, Jewelry, Makeup, etc. Removed Yes   Dentures, Hearing Aid, Prosthetic Devices Removed Yes   Touch hair to check for hair spray Yes   Cold/Flu Symptoms No   Diabetic Patient Eaten Yes   Medications Given Not applicable   Fears and apprehensions verbalized Yes   Ground Wire Secured Yes   HBO Treatment Course Details   Treatment Course Number 1   Ordering Provider Waylon SCHILLING   Indications Diabetic wounds of lower extremities   HBO Treatment Start Date 12/19/23   HBO Treatment Details   Treatment Number 32   Inpatient Visit No   Total Treatment Length Calc (minutes) 108   Chamber Type Monoplace   Chamber # 2   HBO Dive Log # 1193   Treatment Protocol 2.0 SURI x 90 minutes w/100% oxygen and no air break   Treatment Details   Dive Rate Down 1.5 psi/minute   Dive Rate Up 2.0 psi/minute   Compress Begins 1 SURI   Clock Time 1023   Tx Pressure Reached 2 SURI   Clock Time 1033   Decompress Begins 2 SURI   Clock Time 1203   Decompress Ends 1 SURI   Clock Time 1211   Pre HBO Vital Signs   /71   Pulse 109   Resp 16   Temp 98.1 °F (36.7 °C)   Blood Glucose 128   Glucose Meter # wc   Pain Level 0   Post HBO Vital Signs   BP (!) 169/94   Pulse 88   Resp 16   Blood Glucose 135   Glucose Meter # wc   Pain Level 0         Arrived awake and alert. ABC's intact. Ambulated without assistance. Cleared for treatment by Kira SCHILLING Tolerated treatment without complaints. Cleared from chamber without incidents. Follow up tomorrow for HBO.         ICD-10-CM ICD-9-CM   1. Chronic osteomyelitis of right foot  M86.671 730.17   2. Acute on chronic osteomyelitis  M86.10 730.00    M86.60 730.10   3. Diabetic ulcer of left midfoot associated with type 2 diabetes mellitus, with  bone involvement without evidence of necrosis  E11.621 250.80    L97.426 707.14   4. Diabetic ulcer of right midfoot associated with type 2 diabetes mellitus, with bone involvement without evidence of necrosis  E11.621 250.80    L97.416 707.14          Physician Supervision  I provided direct supervision and was immediately available to furnish assistance and direction throughout the performance of the procedure.    Emergency Response Team  A trained emergency response team and emergency services were available throughout procedure.

## 2023-02-10 ENCOUNTER — HOSPITAL ENCOUNTER (OUTPATIENT)
Dept: WOUND CARE | Facility: HOSPITAL | Age: 53
Discharge: HOME | End: 2023-02-10
Attending: FAMILY MEDICINE
Payer: MEDICARE

## 2023-02-10 ENCOUNTER — HOSPITAL ENCOUNTER (OUTPATIENT)
Dept: WOUND CARE | Facility: HOSPITAL | Age: 53
Discharge: HOME | End: 2023-02-10
Attending: PODIATRIST
Payer: MEDICARE

## 2023-02-10 VITALS — TEMPERATURE: 98 F | HEART RATE: 88 BPM | SYSTOLIC BLOOD PRESSURE: 169 MMHG | DIASTOLIC BLOOD PRESSURE: 90 MMHG

## 2023-02-10 DIAGNOSIS — L97.426 DIABETIC ULCER OF LEFT MIDFOOT ASSOCIATED WITH TYPE 2 DIABETES MELLITUS, WITH BONE INVOLVEMENT WITHOUT EVIDENCE OF NECROSIS: ICD-10-CM

## 2023-02-10 DIAGNOSIS — M86.10 ACUTE ON CHRONIC OSTEOMYELITIS: ICD-10-CM

## 2023-02-10 DIAGNOSIS — E11.621 DIABETIC ULCER OF LEFT FOOT: Primary | ICD-10-CM

## 2023-02-10 DIAGNOSIS — M86.60 ACUTE ON CHRONIC OSTEOMYELITIS: ICD-10-CM

## 2023-02-10 DIAGNOSIS — E11.621 DIABETIC ULCER OF RIGHT MIDFOOT ASSOCIATED WITH TYPE 2 DIABETES MELLITUS, WITH BONE INVOLVEMENT WITHOUT EVIDENCE OF NECROSIS: ICD-10-CM

## 2023-02-10 DIAGNOSIS — L97.422 DIABETIC ULCER OF LEFT MIDFOOT ASSOCIATED WITH TYPE 2 DIABETES MELLITUS, WITH FAT LAYER EXPOSED: ICD-10-CM

## 2023-02-10 DIAGNOSIS — L97.416 DIABETIC ULCER OF RIGHT MIDFOOT ASSOCIATED WITH TYPE 2 DIABETES MELLITUS, WITH BONE INVOLVEMENT WITHOUT EVIDENCE OF NECROSIS: ICD-10-CM

## 2023-02-10 DIAGNOSIS — L97.529 DIABETIC ULCER OF LEFT FOOT: Primary | ICD-10-CM

## 2023-02-10 DIAGNOSIS — E11.621 DIABETIC ULCER OF LEFT MIDFOOT ASSOCIATED WITH TYPE 2 DIABETES MELLITUS, WITH BONE INVOLVEMENT WITHOUT EVIDENCE OF NECROSIS: ICD-10-CM

## 2023-02-10 DIAGNOSIS — M86.671 CHRONIC OSTEOMYELITIS OF RIGHT FOOT: Primary | ICD-10-CM

## 2023-02-10 DIAGNOSIS — E11.621 DIABETIC ULCER OF LEFT MIDFOOT ASSOCIATED WITH TYPE 2 DIABETES MELLITUS, WITH FAT LAYER EXPOSED: ICD-10-CM

## 2023-02-10 LAB
POCT GLUCOSE: 138 MG/DL (ref 70–110)
POCT GLUCOSE: 179 MG/DL (ref 70–110)

## 2023-02-10 PROCEDURE — 11042 DBRDMT SUBQ TIS 1ST 20SQCM/<: CPT | Performed by: PODIATRIST

## 2023-02-10 PROCEDURE — 99499 NO LOS: ICD-10-PCS | Mod: ,,, | Performed by: PODIATRIST

## 2023-02-10 PROCEDURE — 11042 DBRDMT SUBQ TIS 1ST 20SQCM/<: CPT | Mod: ,,, | Performed by: PODIATRIST

## 2023-02-10 PROCEDURE — 11042 DEBRIDEMENT: ICD-10-PCS | Mod: ,,, | Performed by: PODIATRIST

## 2023-02-10 PROCEDURE — G0277 HBOT, FULL BODY CHAMBER, 30M: HCPCS | Performed by: FAMILY MEDICINE

## 2023-02-10 PROCEDURE — 99499 UNLISTED E&M SERVICE: CPT | Mod: ,,, | Performed by: PODIATRIST

## 2023-02-10 PROCEDURE — 99183 HYPERBARIC OXYGEN THERAPY: CPT | Mod: ,,, | Performed by: FAMILY MEDICINE

## 2023-02-10 PROCEDURE — 99183 PR HYPERBARIC OXYGEN THERAPY ATTENDANCE/SUPERVISION, PER SESSION: ICD-10-PCS | Mod: ,,, | Performed by: FAMILY MEDICINE

## 2023-02-10 PROCEDURE — 82962 GLUCOSE BLOOD TEST: CPT | Performed by: FAMILY MEDICINE

## 2023-02-10 NOTE — PATIENT INSTRUCTIONS
Lotions:  Eucerin  Eucerin for Diabetics  Aquaphor  A&D Ointment  Gold Bond for Diabetics  Sween  Rodrigo's Bees All Purpose Baby Ointment  Urea 40 with Aloe ([email or web address])    OTC Pain Creams:  Voltaren Gel  Biofreeze  Bengay  Palmetto Holland  Two Old Goat  Lidocaine Gel  Absorbine Veterinary Liniment Gel Topical Analgesic Sore Muscle and Joint Pain Relief    Shoes:  Oasics GT 2000 or gel foundations  New Balance- stability type shoes (940 series)  Saucony Stabil C3  Falcon GTS, Beast, Transcend  Hoka One Bondi7    Brands:  Propet  Sofft- for women  Axxiom- for women  Champion&Bardales- for men  Hailey  Cropeng  Aerosoles  Naturalizers  SAS  Ecco  Born  José Antonio Wang  [facility]- dress shoes  Vionic  Birkenstocks  Fitflops  Navneet

## 2023-02-10 NOTE — PROGRESS NOTES
02/10/23 1000   Hyperbaric Pre-Inspection   Mattress cleaned prior to treatment Yes   2 Patient Identifiers Verified Yes   Consent Obtained Yes   Cotton Gown Yes   Patient voided Yes   Drainage Bags Emptied Not applicable   Patient Pregnant Not applicable   Glasses, Jewelry, Makeup, etc. Removed Yes   Dentures, Hearing Aid, Prosthetic Devices Removed Yes   Touch hair to check for hair spray Yes   Cold/Flu Symptoms No   Diabetic Patient Eaten Not applicable   Medications Given Not applicable   Fears and apprehensions verbalized Yes   Ground Wire Secured Yes   HBO Treatment Course Details   Treatment Course Number 1   Ordering Provider Waylon Benoit Diabetic wounds of lower extremities   HBO Treatment Start Date 12/19/23   HBO Treatment Details   Treatment Number 33   Inpatient Visit No   Total Treatment Length Calc (minutes) 110   Chamber Type Monoplace   Chamber # 1   HBO Dive Log # 2771   Treatment Protocol 2.0 SURI x 90 minutes w/100% oxygen and no air break   Treatment Details   Dive Rate Down 1.5 psi/minute   Dive Rate Up 2.0 psi/minute   Compress Begins 1 SURI   Clock Time 1012   Tx Pressure Reached 2 SURI   Clock Time 1022   Decompress Begins 2 SURI   Clock Time 1152   Decompress Ends 1 SURI   Clock Time 1202   Pre HBO Vital Signs   /75   Pulse 96   Resp 18   Temp 98.1 °F (36.7 °C)   Blood Glucose 179   Glucose Meter # wc   Pain Level 0   Post HBO Vital Signs   BP (!) 151/78   Pulse 89   Resp 16   Blood Glucose 138   Glucose Meter # wc   Pain Level 0         Arrived awake and alert. ABC's intact. Ambulated without assistance. Cleared for treatment by Jennifer SCHILLING Tolerated treatment without complaints. Cleared from chamber without incidents. Follow up monday for HBO.           ICD-10-CM ICD-9-CM   1. Chronic osteomyelitis of right foot  M86.671 730.17   2. Acute on chronic osteomyelitis  M86.10 730.00    M86.60 730.10   3. Diabetic ulcer of right midfoot associated with type 2 diabetes  mellitus, with bone involvement without evidence of necrosis  E11.621 250.80    L97.416 707.14   4. Diabetic ulcer of left midfoot associated with type 2 diabetes mellitus, with bone involvement without evidence of necrosis  E11.621 250.80    L97.426 707.14          Physician Supervision  I provided direct supervision and was immediately available to furnish assistance and direction throughout the performance of the procedure.    Emergency Response Team  A trained emergency response team and emergency services were available throughout procedure.

## 2023-02-10 NOTE — PROGRESS NOTES
[facility]                Progress Note    Subjective:       Patient ID: Indio Ryder Jr. is a 52 y.o. male.    Chief Complaint: Wound Check    Returns to clinic for evaluation and treatment of foot wounds.  He has been going to [facility] as ordered and had nursing wound care since our last encounter.  No new pedal complaints.    Wound Check    Review of Systems   Constitutional:  Negative for appetite change, chills, fatigue and fever.   HENT:  Negative for hearing loss.    Eyes:  Negative for photophobia and visual disturbance.   Respiratory:  Negative for cough, chest tightness, shortness of breath and wheezing.    Cardiovascular:  Positive for leg swelling. Negative for chest pain and palpitations.   Gastrointestinal:  Negative for constipation, diarrhea, nausea and vomiting.   Endocrine: Negative for cold intolerance and heat intolerance.   Genitourinary:  Negative for flank pain.   Musculoskeletal:  Positive for gait problem and myalgias. Negative for neck pain and neck stiffness.   Skin:  Positive for wound. Negative for color change.   Neurological:  Positive for numbness. Negative for weakness, light-headedness and headaches.        + paresthesia    Psychiatric/Behavioral:  Negative for sleep disturbance.        Objective:        Physical Exam  Constitutional:       Appearance: He is well-developed.   Cardiovascular:      Comments: Dorsalis pedis and posterior tibial pulses are palpable Skin is atrophic, slightly hyperpigmented, and mildly edematous      Musculoskeletal:         General: No tenderness. Normal range of motion.      Comments: Muscle strength is 5/5 in all groups bilaterally.    S/p partial 5th ray amp b/l    S/p hallux amp right    Fat pad atrophy to heels and met heads bilateral       Skin:     General: Skin is warm and dry.      Coloration: Skin is not jaundiced, mottled or sallow.      Findings: Wound (see below) present. No abscess or  ecchymosis.      Comments:        Neurological:      Mental Status: He is alert and oriented to person, place, and time.      Comments: Madison-Nenita 5.07 monofilamant testing is diminished Kenji feet. Sharp/dull sensation diminished Bilaterally. Light touch absent Bilaterally.       Psychiatric:         Behavior: Behavior normal.       Vitals:    02/10/23 0959   BP: (!) 169/90   Pulse: 88   Temp: 97.5 °F (36.4 °C)       Assessment:           ICD-10-CM ICD-9-CM   1. Diabetic ulcer of left foot  E11.621 250.80    L97.529 707.15   2. Diabetic ulcer of left midfoot associated with type 2 diabetes mellitus, with fat layer exposed  E11.621 250.80    L97.422 707.14            Wound 04/08/22 1137 Diabetic Ulcer Left plantar;medial Foot (Active)   04/08/22 1137    Pre-existing: Yes   Primary Wound Type: Diabetic ulc   Side: Left   Orientation: plantar;medial   Location: Foot   Wound Number:    Ankle-Brachial Index:    Pulses:    Removal Indication and Assessment:    Wound Outcome:    (Retired) Wound Type:    (Retired) Wound Length (cm):    (Retired) Wound Width (cm):    (Retired) Depth (cm):    Wound Description (Comments):    Removal Indications:    Wound Image   02/10/23 1002   Wound WDL ex 02/10/23 1002   Dressing Appearance Dry;Intact 02/10/23 1002   Drainage Amount Scant 02/10/23 1002   Drainage Characteristics/Odor Serosanguineous;Serous 02/10/23 1002   Appearance Red 02/10/23 1002   Tissue loss description Partial thickness 02/10/23 1002   Red (%), Wound Tissue Color 100 % 02/10/23 1002   Periwound Area Intact;Dry 02/10/23 1002   Wound Edges Defined 02/10/23 1002   Wound Length (cm) 0.3 cm 02/10/23 1002   Wound Width (cm) 0.3 cm 02/10/23 1002   Wound Depth (cm) 0.5 cm 02/10/23 1002   Wound Volume (cm^3) 0.045 cm^3 02/10/23 1002   Wound Surface Area (cm^2) 0.09 cm^2 02/10/23 1002   Tunneling (depth (cm)/location) 2 cm at 3 o'clock 02/10/23 1002   Care Cleansed with:;Antimicrobial agent;Sterile normal saline 02/10/23  1002   Dressing Changed 02/10/23 1002   Compression Tubular elasticized bandage 02/10/23 1002   Dressing Change Due 02/17/23 02/10/23 1002           Plan:            Debridement    Date/Time: 2/10/2023 9:00 AM  Performed by: Marivel Peterson DPM  Authorized by: Marivel Peterson DPM       Wound Details:    Location:  Left foot    Location:  Left Heel    Type of Debridement:  Excisional       Length (cm):  0.3       Area (sq cm):  0.09       Width (cm):  0.3       Percent Debrided (%):  100       Depth (cm):  0.5       Total Area Debrided (sq cm):  0.09    Depth of debridement:  Subcutaneous tissue    Tissue debrided:  Dermis, Epidermis and Subcutaneous    Devitalized tissue debrided:  Callus and Fibrin    Instruments:  Curette    Bleeding:  Minimal  Hemostasis Achieved: Yes    Method Used:  Pressure  Patient tolerance:  Patient tolerated the procedure well with no immediate complications    Tissue pathology and/or culture taken     [] Yes      [x] No  Sharp debridement performed                   [x] Yes       [] No  Labs ordered     [] Yes       [x] No  Imaging ordered    [] Yes      [x] No       Follow up in about 1 week (around 2/17/2023).

## 2023-02-13 ENCOUNTER — HOSPITAL ENCOUNTER (OUTPATIENT)
Dept: WOUND CARE | Facility: HOSPITAL | Age: 53
Discharge: HOME | End: 2023-02-13
Attending: INTERNAL MEDICINE
Payer: MEDICARE

## 2023-02-13 DIAGNOSIS — E11.621 DIABETIC ULCER OF LEFT MIDFOOT ASSOCIATED WITH TYPE 2 DIABETES MELLITUS, WITH BONE INVOLVEMENT WITHOUT EVIDENCE OF NECROSIS: ICD-10-CM

## 2023-02-13 DIAGNOSIS — M86.10 ACUTE ON CHRONIC OSTEOMYELITIS: ICD-10-CM

## 2023-02-13 DIAGNOSIS — M86.60 ACUTE ON CHRONIC OSTEOMYELITIS: ICD-10-CM

## 2023-02-13 DIAGNOSIS — L97.426 DIABETIC ULCER OF LEFT MIDFOOT ASSOCIATED WITH TYPE 2 DIABETES MELLITUS, WITH BONE INVOLVEMENT WITHOUT EVIDENCE OF NECROSIS: ICD-10-CM

## 2023-02-13 DIAGNOSIS — L97.416 DIABETIC ULCER OF RIGHT MIDFOOT ASSOCIATED WITH TYPE 2 DIABETES MELLITUS, WITH BONE INVOLVEMENT WITHOUT EVIDENCE OF NECROSIS: ICD-10-CM

## 2023-02-13 DIAGNOSIS — M86.671 CHRONIC OSTEOMYELITIS OF RIGHT FOOT: Primary | ICD-10-CM

## 2023-02-13 DIAGNOSIS — E11.621 DIABETIC ULCER OF RIGHT MIDFOOT ASSOCIATED WITH TYPE 2 DIABETES MELLITUS, WITH BONE INVOLVEMENT WITHOUT EVIDENCE OF NECROSIS: ICD-10-CM

## 2023-02-13 LAB — POCT GLUCOSE: 137 MG/DL (ref 70–110)

## 2023-02-13 PROCEDURE — 82962 GLUCOSE BLOOD TEST: CPT | Performed by: INTERNAL MEDICINE

## 2023-02-13 PROCEDURE — G0277 HBOT, FULL BODY CHAMBER, 30M: HCPCS | Performed by: INTERNAL MEDICINE

## 2023-02-13 PROCEDURE — 99183 PR HYPERBARIC OXYGEN THERAPY ATTENDANCE/SUPERVISION, PER SESSION: ICD-10-PCS | Mod: ,,, | Performed by: INTERNAL MEDICINE

## 2023-02-13 PROCEDURE — 99183 HYPERBARIC OXYGEN THERAPY: CPT | Mod: ,,, | Performed by: INTERNAL MEDICINE

## 2023-02-13 NOTE — PROGRESS NOTES
02/13/23 1000   Hyperbaric Pre-Inspection   Mattress cleaned prior to treatment Yes   2 Patient Identifiers Verified Yes   Consent Obtained Yes   Cotton Gown Yes   Patient voided Yes   Drainage Bags Emptied Not applicable   Patient Pregnant Not applicable   Glasses, Jewelry, Makeup, etc. Removed Yes   Dentures, Hearing Aid, Prosthetic Devices Removed Yes   Touch hair to check for hair spray Yes   Cold/Flu Symptoms No   Diabetic Patient Eaten Yes   Medications Given Not applicable   Fears and apprehensions verbalized Yes   Ground Wire Secured Yes   HBO Treatment Course Details   Treatment Course Number 1   Ordering Provider Waylon SCHILLING   Indications Diabetic wounds of lower extremities   HBO Treatment Start Date 12/19/23   HBO Treatment Details   Treatment Number 34   Inpatient Visit No   Total Treatment Length Calc (minutes) 110   Chamber Type Monoplace   Chamber # 2   HBO Dive Log # 1196   Treatment Protocol 2.0 SURI x 90 minutes w/100% oxygen and no air break   Treatment Details   Dive Rate Down 1.5 psi/minute   Dive Rate Up 2.0 psi/minute   Compress Begins 1 SURI   Clock Time 1031   Tx Pressure Reached 2 SURI   Clock Time 1041   Decompress Begins 2 SURI   Clock Time 1211   Decompress Ends 1 SURI   Clock Time 1221   Pre HBO Vital Signs   BP (!) 104/56   Pulse 98   Resp 18   Temp 97.7 °F (36.5 °C)   Blood Glucose 166   Glucose Meter # wc   Pain Level 0   Post HBO Vital Signs   BP (!) 173/92   Pulse 85   Resp 16   Blood Glucose 137   Glucose Meter # wc   Pain Level 0         Arrived awake and alert. ABC's intact. Ambulated without assistance. Cleared for treatment by Waylon SCHILLING Tolerated treatment without complaints. Cleared from chamber without incidents. Follow up tomorrow for HBO.           ICD-10-CM ICD-9-CM   1. Chronic osteomyelitis of right foot  M86.671 730.17   2. Acute on chronic osteomyelitis  M86.10 730.00    M86.60 730.10   3. Diabetic ulcer of right midfoot associated with type 2 diabetes mellitus,  with bone involvement without evidence of necrosis  E11.621 250.80    L97.416 707.14   4. Diabetic ulcer of left midfoot associated with type 2 diabetes mellitus, with bone involvement without evidence of necrosis  E11.621 250.80    L97.426 707.14          Physician Supervision  I provided direct supervision and was immediately available to furnish assistance and direction throughout the performance of the procedure.    Emergency Response Team  A trained emergency response team and emergency services were available throughout procedure.

## 2023-02-14 ENCOUNTER — HOSPITAL ENCOUNTER (OUTPATIENT)
Dept: WOUND CARE | Facility: HOSPITAL | Age: 53
Discharge: HOME | End: 2023-02-14
Attending: FAMILY MEDICINE
Payer: MEDICARE

## 2023-02-14 VITALS — HEART RATE: 84 BPM | TEMPERATURE: 98 F | DIASTOLIC BLOOD PRESSURE: 98 MMHG | SYSTOLIC BLOOD PRESSURE: 166 MMHG

## 2023-02-14 DIAGNOSIS — E11.621 DIABETIC ULCER OF RIGHT MIDFOOT ASSOCIATED WITH TYPE 2 DIABETES MELLITUS, WITH BONE INVOLVEMENT WITHOUT EVIDENCE OF NECROSIS: ICD-10-CM

## 2023-02-14 DIAGNOSIS — E11.621 DIABETIC ULCER OF LEFT MIDFOOT ASSOCIATED WITH TYPE 2 DIABETES MELLITUS, WITH BONE INVOLVEMENT WITHOUT EVIDENCE OF NECROSIS: Primary | ICD-10-CM

## 2023-02-14 DIAGNOSIS — L97.426 DIABETIC ULCER OF LEFT MIDFOOT ASSOCIATED WITH TYPE 2 DIABETES MELLITUS, WITH BONE INVOLVEMENT WITHOUT EVIDENCE OF NECROSIS: Primary | ICD-10-CM

## 2023-02-14 DIAGNOSIS — L97.416 DIABETIC ULCER OF RIGHT MIDFOOT ASSOCIATED WITH TYPE 2 DIABETES MELLITUS, WITH BONE INVOLVEMENT WITHOUT EVIDENCE OF NECROSIS: ICD-10-CM

## 2023-02-14 DIAGNOSIS — L97.426 DIABETIC ULCER OF LEFT MIDFOOT ASSOCIATED WITH TYPE 2 DIABETES MELLITUS, WITH BONE INVOLVEMENT WITHOUT EVIDENCE OF NECROSIS: ICD-10-CM

## 2023-02-14 DIAGNOSIS — M86.10 ACUTE ON CHRONIC OSTEOMYELITIS: ICD-10-CM

## 2023-02-14 DIAGNOSIS — M86.60 ACUTE ON CHRONIC OSTEOMYELITIS: ICD-10-CM

## 2023-02-14 DIAGNOSIS — E11.621 DIABETIC ULCER OF LEFT MIDFOOT ASSOCIATED WITH TYPE 2 DIABETES MELLITUS, WITH BONE INVOLVEMENT WITHOUT EVIDENCE OF NECROSIS: ICD-10-CM

## 2023-02-14 DIAGNOSIS — M86.671 CHRONIC OSTEOMYELITIS OF RIGHT FOOT: Primary | ICD-10-CM

## 2023-02-14 LAB
POCT GLUCOSE: 194 MG/DL (ref 70–110)
POCT GLUCOSE: 233 MG/DL (ref 70–110)

## 2023-02-14 PROCEDURE — 99183 HYPERBARIC OXYGEN THERAPY: CPT | Mod: ,,, | Performed by: FAMILY MEDICINE

## 2023-02-14 PROCEDURE — 82962 GLUCOSE BLOOD TEST: CPT | Mod: 91 | Performed by: FAMILY MEDICINE

## 2023-02-14 PROCEDURE — G0277 HBOT, FULL BODY CHAMBER, 30M: HCPCS | Performed by: FAMILY MEDICINE

## 2023-02-14 PROCEDURE — 99183 PR HYPERBARIC OXYGEN THERAPY ATTENDANCE/SUPERVISION, PER SESSION: ICD-10-PCS | Mod: ,,, | Performed by: FAMILY MEDICINE

## 2023-02-14 NOTE — PROGRESS NOTES
02/14/23 0900   Hyperbaric Pre-Inspection   Mattress cleaned prior to treatment Yes   2 Patient Identifiers Verified Yes   Consent Obtained Yes   Cotton Gown Yes   Patient voided Yes   Drainage Bags Emptied Not applicable   Patient Pregnant Not applicable   Glasses, Jewelry, Makeup, etc. Removed Yes   Dentures, Hearing Aid, Prosthetic Devices Removed Yes   Touch hair to check for hair spray Yes   Cold/Flu Symptoms No   Diabetic Patient Eaten Yes   Medications Given Not applicable   Fears and apprehensions verbalized Yes   Ground Wire Secured Yes   HBO Treatment Course Details   Treatment Course Number 1   Ordering Provider Waylon SCHILLING   Indications Diabetic wounds of lower extremities   HBO Treatment Start Date 12/19/23   HBO Treatment Details   Treatment Number 35   Inpatient Visit No   Total Treatment Length Calc (minutes) 108   Chamber Type Monoplace   Chamber # 2   HBO Dive Log # 1198   Treatment Protocol 2.0 SURI x 90 minutes w/100% oxygen and no air break   Treatment Details   Dive Rate Down 1.5 psi/minute   Dive Rate Up 2.0 psi/minute   Compress Begins 1 SURI   Clock Time 0923   Tx Pressure Reached 2 SURI   Clock Time 0933   Decompress Begins 2 SURI   Clock Time 1103   Decompress Ends 1 SURI   Clock Time 1111   Pre HBO Vital Signs   BP (!) 142/75   Pulse 89   Resp 18   Temp 97.9 °F (36.6 °C)   Blood Glucose 233   Glucose Meter # wc   Pain Level 0   Post HBO Vital Signs   BP (!) 166/98   Pulse 84   Resp 16   Blood Glucose 194   Glucose Meter # wc   Pain Level 0           Arrived awake and alert. ABC's intact. Ambulated without assistance. Cleared for treatment by Mouna SCHILLING Tolerated treatment without complaints. Cleared from chamber without incidents. Follow up tomorrow for HBO.           ICD-10-CM ICD-9-CM   1. Chronic osteomyelitis of right foot  M86.671 730.17   2. Acute on chronic osteomyelitis  M86.10 730.00    M86.60 730.10   3. Diabetic ulcer of left midfoot associated with type 2 diabetes mellitus,  with bone involvement without evidence of necrosis  E11.621 250.80    L97.426 707.14   4. Diabetic ulcer of right midfoot associated with type 2 diabetes mellitus, with bone involvement without evidence of necrosis  E11.621 250.80    L97.416 707.14        Physician Supervision  I provided direct supervision and was immediately available to furnish assistance and direction throughout the performance of the procedure.    Emergency Response Team  A trained emergency response team and emergency services were available throughout procedure.

## 2023-02-14 NOTE — PROGRESS NOTES
[facility]      Subjective:     Patient seen in clinic today.  Dressing changed as ordered.      Assessment:            ICD-10-CM ICD-9-CM   1. Diabetic ulcer of left midfoot associated with type 2 diabetes mellitus, with bone involvement without evidence of necrosis  E11.621 250.80    L97.426 707.14         Plan:   [unfilled]        No orders of the defined types were placed in this encounter.

## 2023-02-15 ENCOUNTER — HOSPITAL ENCOUNTER (OUTPATIENT)
Dept: WOUND CARE | Facility: HOSPITAL | Age: 53
Discharge: HOME | End: 2023-02-15
Attending: FAMILY MEDICINE
Payer: MEDICARE

## 2023-02-15 DIAGNOSIS — E11.621 DIABETIC ULCER OF LEFT MIDFOOT ASSOCIATED WITH TYPE 2 DIABETES MELLITUS, WITH BONE INVOLVEMENT WITHOUT EVIDENCE OF NECROSIS: ICD-10-CM

## 2023-02-15 DIAGNOSIS — L97.426 DIABETIC ULCER OF LEFT MIDFOOT ASSOCIATED WITH TYPE 2 DIABETES MELLITUS, WITH BONE INVOLVEMENT WITHOUT EVIDENCE OF NECROSIS: ICD-10-CM

## 2023-02-15 DIAGNOSIS — L97.416 DIABETIC ULCER OF RIGHT MIDFOOT ASSOCIATED WITH TYPE 2 DIABETES MELLITUS, WITH BONE INVOLVEMENT WITHOUT EVIDENCE OF NECROSIS: ICD-10-CM

## 2023-02-15 DIAGNOSIS — M86.60 ACUTE ON CHRONIC OSTEOMYELITIS: ICD-10-CM

## 2023-02-15 DIAGNOSIS — M86.671 CHRONIC OSTEOMYELITIS OF RIGHT FOOT: Primary | ICD-10-CM

## 2023-02-15 DIAGNOSIS — E11.621 DIABETIC ULCER OF RIGHT MIDFOOT ASSOCIATED WITH TYPE 2 DIABETES MELLITUS, WITH BONE INVOLVEMENT WITHOUT EVIDENCE OF NECROSIS: ICD-10-CM

## 2023-02-15 DIAGNOSIS — M86.10 ACUTE ON CHRONIC OSTEOMYELITIS: ICD-10-CM

## 2023-02-15 LAB
POCT GLUCOSE: 169 MG/DL (ref 70–110)
POCT GLUCOSE: 248 MG/DL (ref 70–110)

## 2023-02-15 PROCEDURE — G0277 HBOT, FULL BODY CHAMBER, 30M: HCPCS | Performed by: FAMILY MEDICINE

## 2023-02-15 PROCEDURE — 99183 HYPERBARIC OXYGEN THERAPY: CPT | Mod: ,,, | Performed by: FAMILY MEDICINE

## 2023-02-15 PROCEDURE — 82962 GLUCOSE BLOOD TEST: CPT | Performed by: FAMILY MEDICINE

## 2023-02-15 PROCEDURE — 99183 PR HYPERBARIC OXYGEN THERAPY ATTENDANCE/SUPERVISION, PER SESSION: ICD-10-PCS | Mod: ,,, | Performed by: FAMILY MEDICINE

## 2023-02-15 NOTE — PROGRESS NOTES
02/15/23 1000   Hyperbaric Pre-Inspection   Mattress cleaned prior to treatment Yes   2 Patient Identifiers Verified Yes   Consent Obtained Yes   Cotton Gown Yes   Patient voided Yes   Drainage Bags Emptied Not applicable   Patient Pregnant Not applicable   Glasses, Jewelry, Makeup, etc. Removed Yes   Dentures, Hearing Aid, Prosthetic Devices Removed Yes   Touch hair to check for hair spray Yes   Cold/Flu Symptoms No   Diabetic Patient Eaten Yes   Medications Given Not applicable   Fears and apprehensions verbalized Yes   Ground Wire Secured Yes   HBO Treatment Course Details   Treatment Course Number 1   Ordering Provider Waylon SCHILLING   Indications Diabetic wounds of lower extremities   HBO Treatment Start Date 12/19/23   HBO Treatment Details   Treatment Number 36   Inpatient Visit No   Total Treatment Length Calc (minutes) 108   Chamber Type Monoplace   Chamber # 2   HBO Dive Log # 1200   Treatment Protocol 2.0 SURI x 90 minutes w/100% oxygen and no air break   Treatment Details   Dive Rate Down 1.5 psi/minute   Dive Rate Up 2.0 psi/minute   Compress Begins 1 SURI   Clock Time 1031   Tx Pressure Reached 2 SURI   Clock Time 1041   Decompress Begins 2 SURI   Clock Time 1211   Decompress Ends 1 SURI   Clock Time 1219   Pre HBO Vital Signs   /73   Pulse 103   Resp 16   Temp 97.7 °F (36.5 °C)   Blood Glucose 248   Glucose Meter # wc   Pain Level 0   Post HBO Vital Signs   /85   Pulse 88   Resp 16   Blood Glucose 169   Glucose Meter # wc   Pain Level 0           Arrived awake and alert. ABC's intact. Ambulated without assistance. Cleared for treatment by Jennifer SCHILLING Tolerated treatment without complaints. Cleared from chamber without incidents. Follow up tomorrow for HBO.         ICD-10-CM ICD-9-CM   1. Chronic osteomyelitis of right foot  M86.671 730.17   2. Acute on chronic osteomyelitis  M86.10 730.00    M86.60 730.10   3. Diabetic ulcer of left midfoot associated with type 2 diabetes mellitus, with bone  involvement without evidence of necrosis  E11.621 250.80    L97.426 707.14   4. Diabetic ulcer of right midfoot associated with type 2 diabetes mellitus, with bone involvement without evidence of necrosis  E11.621 250.80    L97.416 707.14        Physician Supervision  I provided direct supervision and was immediately available to furnish assistance and direction throughout the performance of the procedure.    Emergency Response Team  A trained emergency response team and emergency services were available throughout procedure.

## 2023-02-16 ENCOUNTER — HOSPITAL ENCOUNTER (OUTPATIENT)
Dept: WOUND CARE | Facility: HOSPITAL | Age: 53
Discharge: HOME | End: 2023-02-16
Attending: FAMILY MEDICINE
Payer: MEDICARE

## 2023-02-16 DIAGNOSIS — M86.60 ACUTE ON CHRONIC OSTEOMYELITIS: ICD-10-CM

## 2023-02-16 DIAGNOSIS — L97.416 DIABETIC ULCER OF RIGHT MIDFOOT ASSOCIATED WITH TYPE 2 DIABETES MELLITUS, WITH BONE INVOLVEMENT WITHOUT EVIDENCE OF NECROSIS: ICD-10-CM

## 2023-02-16 DIAGNOSIS — E11.621 DIABETIC ULCER OF RIGHT MIDFOOT ASSOCIATED WITH TYPE 2 DIABETES MELLITUS, WITH BONE INVOLVEMENT WITHOUT EVIDENCE OF NECROSIS: ICD-10-CM

## 2023-02-16 DIAGNOSIS — E11.621 DIABETIC ULCER OF LEFT MIDFOOT ASSOCIATED WITH TYPE 2 DIABETES MELLITUS, WITH BONE INVOLVEMENT WITHOUT EVIDENCE OF NECROSIS: ICD-10-CM

## 2023-02-16 DIAGNOSIS — M86.671 CHRONIC OSTEOMYELITIS OF RIGHT FOOT: Primary | ICD-10-CM

## 2023-02-16 DIAGNOSIS — M86.10 ACUTE ON CHRONIC OSTEOMYELITIS: ICD-10-CM

## 2023-02-16 DIAGNOSIS — L97.426 DIABETIC ULCER OF LEFT MIDFOOT ASSOCIATED WITH TYPE 2 DIABETES MELLITUS, WITH BONE INVOLVEMENT WITHOUT EVIDENCE OF NECROSIS: ICD-10-CM

## 2023-02-16 LAB
POCT GLUCOSE: 133 MG/DL (ref 70–110)
POCT GLUCOSE: 185 MG/DL (ref 70–110)

## 2023-02-16 PROCEDURE — 99183 HYPERBARIC OXYGEN THERAPY: CPT | Mod: ,,, | Performed by: FAMILY MEDICINE

## 2023-02-16 PROCEDURE — G0277 HBOT, FULL BODY CHAMBER, 30M: HCPCS | Performed by: FAMILY MEDICINE

## 2023-02-16 PROCEDURE — 99183 PR HYPERBARIC OXYGEN THERAPY ATTENDANCE/SUPERVISION, PER SESSION: ICD-10-PCS | Mod: ,,, | Performed by: FAMILY MEDICINE

## 2023-02-16 PROCEDURE — 82962 GLUCOSE BLOOD TEST: CPT | Performed by: FAMILY MEDICINE

## 2023-02-16 NOTE — PROGRESS NOTES
02/16/23 1000   Hyperbaric Pre-Inspection   Mattress cleaned prior to treatment Yes   2 Patient Identifiers Verified Yes   Consent Obtained Yes   Cotton Gown Yes   Patient voided Yes   Drainage Bags Emptied Not applicable   Patient Pregnant Not applicable   Glasses, Jewelry, Makeup, etc. Removed Yes   Dentures, Hearing Aid, Prosthetic Devices Removed Yes   Touch hair to check for hair spray Yes   Cold/Flu Symptoms No   Diabetic Patient Eaten Yes   Medications Given Not applicable   Fears and apprehensions verbalized Yes   Ground Wire Secured Yes   HBO Treatment Course Details   Treatment Course Number 1   Ordering Provider Waylon SCHILLING   Indications Diabetic wounds of lower extremities   HBO Treatment Start Date 12/19/23   HBO Treatment Details   Treatment Number 37   Inpatient Visit No   Total Treatment Length Calc (minutes) 108   Chamber Type Monoplace   Chamber # 2   HBO Dive Log # 1202   Treatment Protocol 2.0 SURI x 90 minutes w/100% oxygen and no air break   Treatment Details   Dive Rate Down 1.5 psi/minute   Dive Rate Up 2.0 psi/minute   Compress Begins 1 SURI   Clock Time 1020   Tx Pressure Reached 2 SURI   Clock Time 1030   Decompress Begins 2 SURI   Clock Time 1200   Decompress Ends 1 SURI   Clock Time 1208   Pre HBO Vital Signs   /75   Pulse 96   Resp 16   Temp 97.7 °F (36.5 °C)   Blood Glucose 185   Glucose Meter # wc   Pain Level 0   Post HBO Vital Signs   BP (!) 173/85   Pulse 88   Resp 16   Blood Glucose 133   Glucose Meter # wc   Pain Level 0         Arrived awake and alert. ABC's intact. Ambulated without assistance. Cleared for treatment by Kira SCHILLING Tolerated treatment without complaints. Cleared from chamber without incidents. Follow up tomorrow for HBO.         ICD-10-CM ICD-9-CM   1. Chronic osteomyelitis of right foot  M86.671 730.17   2. Acute on chronic osteomyelitis  M86.10 730.00    M86.60 730.10   3. Diabetic ulcer of left midfoot associated with type 2 diabetes mellitus, with  bone involvement without evidence of necrosis  E11.621 250.80    L97.426 707.14   4. Diabetic ulcer of right midfoot associated with type 2 diabetes mellitus, with bone involvement without evidence of necrosis  E11.621 250.80    L97.416 707.14        Physician Supervision  I provided direct supervision and was immediately available to furnish assistance and direction throughout the performance of the procedure.    Emergency Response Team  A trained emergency response team and emergency services were available throughout procedure.

## 2023-02-17 ENCOUNTER — HOSPITAL ENCOUNTER (OUTPATIENT)
Dept: WOUND CARE | Facility: HOSPITAL | Age: 53
Discharge: HOME | End: 2023-02-17
Attending: FAMILY MEDICINE
Payer: MEDICARE

## 2023-02-17 ENCOUNTER — HOSPITAL ENCOUNTER (OUTPATIENT)
Dept: WOUND CARE | Facility: HOSPITAL | Age: 53
Discharge: HOME | End: 2023-02-17
Attending: PODIATRIST
Payer: MEDICARE

## 2023-02-17 VITALS — SYSTOLIC BLOOD PRESSURE: 144 MMHG | DIASTOLIC BLOOD PRESSURE: 95 MMHG | TEMPERATURE: 98 F

## 2023-02-17 DIAGNOSIS — E11.621 DIABETIC ULCER OF RIGHT MIDFOOT ASSOCIATED WITH TYPE 2 DIABETES MELLITUS, WITH BONE INVOLVEMENT WITHOUT EVIDENCE OF NECROSIS: ICD-10-CM

## 2023-02-17 DIAGNOSIS — E11.621 DIABETIC ULCER OF LEFT FOOT: ICD-10-CM

## 2023-02-17 DIAGNOSIS — L97.426 DIABETIC ULCER OF LEFT MIDFOOT ASSOCIATED WITH TYPE 2 DIABETES MELLITUS, WITH BONE INVOLVEMENT WITHOUT EVIDENCE OF NECROSIS: ICD-10-CM

## 2023-02-17 DIAGNOSIS — L97.416 DIABETIC ULCER OF RIGHT MIDFOOT ASSOCIATED WITH TYPE 2 DIABETES MELLITUS, WITH BONE INVOLVEMENT WITHOUT EVIDENCE OF NECROSIS: ICD-10-CM

## 2023-02-17 DIAGNOSIS — M86.671 CHRONIC OSTEOMYELITIS OF RIGHT FOOT: Primary | ICD-10-CM

## 2023-02-17 DIAGNOSIS — E11.621 DIABETIC ULCER OF LEFT MIDFOOT ASSOCIATED WITH TYPE 2 DIABETES MELLITUS, WITH BONE INVOLVEMENT WITHOUT EVIDENCE OF NECROSIS: ICD-10-CM

## 2023-02-17 DIAGNOSIS — L97.529 DIABETIC ULCER OF LEFT FOOT: ICD-10-CM

## 2023-02-17 DIAGNOSIS — M86.60 ACUTE ON CHRONIC OSTEOMYELITIS: ICD-10-CM

## 2023-02-17 DIAGNOSIS — M86.10 ACUTE ON CHRONIC OSTEOMYELITIS: ICD-10-CM

## 2023-02-17 LAB
POCT GLUCOSE: 126 MG/DL (ref 70–110)
POCT GLUCOSE: 154 MG/DL (ref 70–110)

## 2023-02-17 PROCEDURE — G0277 HBOT, FULL BODY CHAMBER, 30M: HCPCS | Performed by: FAMILY MEDICINE

## 2023-02-17 PROCEDURE — 11043 DBRDMT MUSC&/FSCA 1ST 20/<: CPT | Mod: ,,, | Performed by: PODIATRIST

## 2023-02-17 PROCEDURE — 11043 DBRDMT MUSC&/FSCA 1ST 20/<: CPT | Performed by: PODIATRIST

## 2023-02-17 PROCEDURE — 99499 UNLISTED E&M SERVICE: CPT | Mod: ,,, | Performed by: PODIATRIST

## 2023-02-17 PROCEDURE — 82962 GLUCOSE BLOOD TEST: CPT | Mod: 91 | Performed by: FAMILY MEDICINE

## 2023-02-17 PROCEDURE — 11046 DBRDMT MUSC&/FSCA EA ADDL: CPT | Performed by: PODIATRIST

## 2023-02-17 PROCEDURE — 99183 PR HYPERBARIC OXYGEN THERAPY ATTENDANCE/SUPERVISION, PER SESSION: ICD-10-PCS | Mod: ,,, | Performed by: FAMILY MEDICINE

## 2023-02-17 PROCEDURE — 99183 HYPERBARIC OXYGEN THERAPY: CPT | Mod: ,,, | Performed by: FAMILY MEDICINE

## 2023-02-17 PROCEDURE — 11043 WOUND DEBRIDEMENT: ICD-10-PCS | Mod: ,,, | Performed by: PODIATRIST

## 2023-02-17 PROCEDURE — 99499 NO LOS: ICD-10-PCS | Mod: ,,, | Performed by: PODIATRIST

## 2023-02-17 NOTE — PROGRESS NOTES
02/17/23 1000   Hyperbaric Pre-Inspection   Mattress cleaned prior to treatment Yes   For Inpatients: Verify correct ID band/correct chart Yes   Consent Obtained Yes   Cotton Gown Yes   Patient voided Yes   Drainage Bags Emptied Not applicable   Patient Pregnant Not applicable   Glasses, Jewelry, Makeup, etc. Removed Yes   Dentures, Hearing Aid, Prosthetic Devices Removed Yes   Touch hair to check for hair spray Yes   Cold/Flu Symptoms No   Diabetic Patient Eaten Yes   Medications Given Not applicable   Fears and apprehensions verbalized Yes   Ground Wire Secured Yes   HBO Treatment Course Details   Treatment Course Number 1   Ordering Provider Waylon SHEFFIELD   Indications Diabetic wounds of lower extremities   HBO Treatment Start Date 12/19/23   HBO Treatment Details   Treatment Number 38   Inpatient Visit No   Total Treatment Length Calc (minutes) 48   Chamber Type Monoplace   Chamber # 1   Treatment Protocol 2.0 SURI x 90 minutes w/100% oxygen and no air break   Treatment Details   Dive Rate Down 1.5 psi/minute   Dive Rate Up 2.0 psi/minute   Compress Begins 1 SURI   Clock Time 1015   Tx Pressure Reached 2 SURI   Clock Time 1025   Decompress Begins 2 SURI   Clock Time 1155   Decompress Ends 1 SURI   Clock Time 1103   Pre HBO Vital Signs   /77   Pulse 92   Resp 18   Temp 98.2 °F (36.8 °C)   Blood Glucose 154   Glucose Meter # wc   Pain Level 0   Post HBO Vital Signs   BP (!) 168/87   Pulse 87   Resp 16   Blood Glucose 126   Glucose Meter # wc   Pain Level 0         Arrived awake and alert. ABC's intact. Ambulated without assistance. Cleared for treatment by Kira SCHILLING Tolerated treatment without complaints. Cleared from chamber without incidents. Follow up Monday for HBO.           ICD-10-CM ICD-9-CM   1. Chronic osteomyelitis of right foot  M86.671 730.17   2. Acute on chronic osteomyelitis  M86.10 730.00    M86.60 730.10   3. Diabetic ulcer of right midfoot associated with type 2 diabetes mellitus, with  bone involvement without evidence of necrosis  E11.621 250.80    L97.416 707.14   4. Diabetic ulcer of left midfoot associated with type 2 diabetes mellitus, with bone involvement without evidence of necrosis  E11.621 250.80    L97.426 707.14        Physician Supervision  I provided direct supervision and was immediately available to furnish assistance and direction throughout the performance of the procedure.    Emergency Response Team  A trained emergency response team and emergency services were available throughout procedure.

## 2023-02-17 NOTE — PROGRESS NOTES
[facility]                Progress Note    Subjective:       Patient ID: Indio Ryder Jr. is a 52 y.o. male.    Chief Complaint: Wound Check    Follow up visit for left and right plantar foot wounds. Patient arrived ambulatory, unaccompanied. Denies any pain or discomfort at this time. Tubigrip medium to both legs, off loading with Darco to right foot and brace to left. Tubigrip to both. No drainage was noted to dressing.     Review of Systems   Constitutional:  Negative for appetite change, chills, fatigue and fever.   HENT:  Negative for hearing loss.    Eyes:  Negative for photophobia and visual disturbance.   Respiratory:  Negative for cough, chest tightness, shortness of breath and wheezing.    Cardiovascular:  Positive for leg swelling. Negative for chest pain and palpitations.   Gastrointestinal:  Negative for constipation, diarrhea, nausea and vomiting.   Endocrine: Negative for cold intolerance and heat intolerance.   Genitourinary:  Negative for flank pain.   Musculoskeletal:  Positive for gait problem and myalgias. Negative for neck pain and neck stiffness.   Skin:  Positive for wound. Negative for color change.   Neurological:  Positive for numbness. Negative for weakness, light-headedness and headaches.        + paresthesia    Psychiatric/Behavioral:  Negative for sleep disturbance.        Objective:        Physical Exam  Constitutional:       Appearance: He is well-developed.   Cardiovascular:      Comments: Dorsalis pedis and posterior tibial pulses are palpable Skin is atrophic, slightly hyperpigmented, and mildly edematous      Musculoskeletal:         General: No tenderness. Normal range of motion.      Comments: Muscle strength is 5/5 in all groups bilaterally.    S/p partial 5th ray amp b/l    S/p hallux amp right    Fat pad atrophy to heels and met heads bilateral       Skin:     General: Skin is warm and dry.      Coloration: Skin is not  jaundiced, mottled or sallow.      Findings: Wound (see below) present. No abscess or ecchymosis.      Comments:        Neurological:      Mental Status: He is alert and oriented to person, place, and time.      Comments: Walland-Nenita 5.07 monofilamant testing is diminished Kenji feet. Sharp/dull sensation diminished Bilaterally. Light touch absent Bilaterally.       Psychiatric:         Behavior: Behavior normal.       Vitals:    02/17/23 0904   BP: (!) 144/95   Temp: 98.2 °F (36.8 °C)       Assessment:           ICD-10-CM ICD-9-CM   1. Chronic osteomyelitis of right foot  M86.671 730.17   2. Diabetic ulcer of left foot  E11.621 250.80    L97.529 707.15   3. 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 Diabetic ulcer of left midfoot associated with type 2 diabetes mellitus, with bone involvement without evidence of necrosis  E11.621 250.80    L97.426 707.14            Wound 04/08/22 1137 Diabetic Ulcer Left plantar;medial Foot (Active)   04/08/22 1137    Pre-existing: Yes   Primary Wound Type: Diabetic ulc   Side: Left   Orientation: plantar;medial   Location: Foot   Wound Number:    Ankle-Brachial Index:    Pulses:    Removal Indication and Assessment:    Wound Outcome:    (Retired) Wound Type:    (Retired) Wound Length (cm):    (Retired) Wound Width (cm):    (Retired) Depth (cm):    Wound Description (Comments):    Removal Indications:    Wound Image    02/17/23 1044   Wound WDL ex 02/17/23 1044   Dressing Appearance Moist drainage 02/17/23 1044   Drainage Amount Scant 02/17/23 1044   Drainage Characteristics/Odor Serosanguineous 02/17/23 1044   Tissue loss description Full thickness 02/17/23 1044   Red (%), Wound Tissue Color 100 % 02/17/23 1044   Wound Edges Defined;Callused 02/17/23 1044   Wound Length (cm) 0.7 cm 02/17/23 1044   Wound Width (cm) 1 cm 02/17/23 1044   Wound Depth (cm) 0.7 cm  02/17/23 1044   Wound Volume (cm^3) 0.49 cm^3 02/17/23 1044   Wound Surface Area (cm^2) 0.7 cm^2 02/17/23 1044   Care Cleansed with:;Antimicrobial agent;Sterile normal saline 02/17/23 1044   Dressing Collagen;Absorptive Pad;Cast padding 02/17/23 1044   Periwound Care Skin barrier film applied 02/17/23 1044   Dressing Change Due 02/24/23 02/17/23 1044           Plan:          Wound Debridement    Date/Time: 2/17/2023 8:58 AM  Performed by: Marivel Peterson DPM  Authorized by: Marivel Peterson DPM       Wound Details:    Location:  Left foot    Location:  Left Midfoot    Type of Debridement:  Excisional       Length (cm):  0.7       Area (sq cm):  0.7       Width (cm):  1       Percent Debrided (%):  100       Depth (cm):  0.6       Total Area Debrided (sq cm):  0.7    Depth of debridement:  Muscle/fascia/tendon    Tissue debrided:  Dermis, Epidermis, Muscle and Subcutaneous    Devitalized tissue debrided:  Biofilm, Callus and Fibrin    Instruments:  Curette    Bleeding:  Minimal  Hemostasis Achieved: Yes    Method Used:  Pressure  Patient tolerance:  Patient tolerated the procedure well with no immediate complications     Right foot wound closed, callused. Left foot wound with scant amount of drainage. No odor noted. Wounds cleansed with antimicrobial soap and NS. Wound to left foot measures 0.7 x 1 x 0.57 Wound soaked with Vashe for 10 minutes. TLC to left foot and leg, Tubigrip to right foot and leg, size E. Patient instructed to follow up in clinic next week, 02/24/23. Patient verbalizes that he understands.      AVS refused, has MyChart    Tissue pathology and/or culture taken     [] Yes      [x] No  Sharp debridement performed                   [x] Yes       [] No  Labs ordered     [] Yes       [x] No  Imaging ordered    [] Yes      [x] No    Orders Placed This Encounter   Procedures    Debridement     This order was created via procedure documentation     Standing Status:   Standing     Number of Occurrences:   1     Change dressing     Clean with NS. Vashe soak for 10 minutes (Left Foot).   Tubigrip single layer, size E folded over foot and secured with Medipore Tape. Darco Shoe.   Left Foot: Gentian Violet to cici wound. Cavilon/Benzoin plantar foot. Custom pad to plantar foot with (1 inch round hole cut over wound to allow for drainage). Pack wound with [place].   Mextra long x1. Coflex TLC to left leg. Abram foam long x 2 in perpendicular fashion. Football dressing (cast padding x3). Tubigrip single layer, size E folded over foot and secured with Medipore Tape. CAM boot.     Nurse Visit - Change Left Foot dressing only per MD.        Follow up in about 1 week (around 2/24/2023) for wound care.

## 2023-02-20 ENCOUNTER — HOSPITAL ENCOUNTER (OUTPATIENT)
Dept: WOUND CARE | Facility: HOSPITAL | Age: 53
Discharge: HOME | End: 2023-02-20
Attending: INTERNAL MEDICINE
Payer: MEDICARE

## 2023-02-20 DIAGNOSIS — M86.671 CHRONIC OSTEOMYELITIS OF RIGHT FOOT: Primary | ICD-10-CM

## 2023-02-20 DIAGNOSIS — L97.426 DIABETIC ULCER OF LEFT MIDFOOT ASSOCIATED WITH TYPE 2 DIABETES MELLITUS, WITH BONE INVOLVEMENT WITHOUT EVIDENCE OF NECROSIS: ICD-10-CM

## 2023-02-20 DIAGNOSIS — E11.621 DIABETIC ULCER OF LEFT MIDFOOT ASSOCIATED WITH TYPE 2 DIABETES MELLITUS, WITH BONE INVOLVEMENT WITHOUT EVIDENCE OF NECROSIS: ICD-10-CM

## 2023-02-20 DIAGNOSIS — M86.10 ACUTE ON CHRONIC OSTEOMYELITIS: ICD-10-CM

## 2023-02-20 DIAGNOSIS — E11.621 DIABETIC ULCER OF RIGHT MIDFOOT ASSOCIATED WITH TYPE 2 DIABETES MELLITUS, WITH BONE INVOLVEMENT WITHOUT EVIDENCE OF NECROSIS: ICD-10-CM

## 2023-02-20 DIAGNOSIS — L97.416 DIABETIC ULCER OF RIGHT MIDFOOT ASSOCIATED WITH TYPE 2 DIABETES MELLITUS, WITH BONE INVOLVEMENT WITHOUT EVIDENCE OF NECROSIS: ICD-10-CM

## 2023-02-20 DIAGNOSIS — M86.60 ACUTE ON CHRONIC OSTEOMYELITIS: ICD-10-CM

## 2023-02-20 LAB
POCT GLUCOSE: 143 MG/DL (ref 70–110)
POCT GLUCOSE: 159 MG/DL (ref 70–110)

## 2023-02-20 PROCEDURE — 99183 PR HYPERBARIC OXYGEN THERAPY ATTENDANCE/SUPERVISION, PER SESSION: ICD-10-PCS | Mod: ,,, | Performed by: INTERNAL MEDICINE

## 2023-02-20 PROCEDURE — 99183 HYPERBARIC OXYGEN THERAPY: CPT | Mod: ,,, | Performed by: INTERNAL MEDICINE

## 2023-02-20 NOTE — PROGRESS NOTES
02/20/23 1000   Hyperbaric Pre-Inspection   Mattress cleaned prior to treatment Yes   2 Patient Identifiers Verified Yes   Consent Obtained Yes   Cotton Gown Yes   Patient voided Yes   Drainage Bags Emptied Not applicable   Patient Pregnant Not applicable   Glasses, Jewelry, Makeup, etc. Removed Yes   Dentures, Hearing Aid, Prosthetic Devices Removed Yes   Touch hair to check for hair spray Yes   Cold/Flu Symptoms No   Diabetic Patient Eaten Yes   Medications Given Not applicable   Fears and apprehensions verbalized Yes   Ground Wire Secured Yes   HBO Treatment Course Details   Ordering Provider Waylon SCHILLING   Indications Diabetic wounds of lower extremities   HBO Treatment Start Date 12/19/23   HBO Treatment Details   Treatment Number 39   Inpatient Visit No   Total Treatment Length Calc (minutes) 108   Chamber Type Monoplace   Chamber # 2   HBO Dive Log # 1203   Treatment Protocol 2.0 SURI x 90 minutes w/100% oxygen and no air break   Treatment Details   Dive Rate Down 1.5 psi/minute   Dive Rate Up 2.0 psi/minute   Compress Begins 1 SURI   Clock Time 1030   Tx Pressure Reached 2 SURI   Clock Time 1040   Decompress Begins 2 SURI   Clock Time 1210   Decompress Ends 1 SURI   Clock Time 1218   Pre HBO Vital Signs   /65   Pulse 94   Resp 18   Temp 99 °F (37.2 °C)   Blood Glucose 159   Glucose Meter # wc   Pain Level 0   Post HBO Vital Signs   BP (!) 169/85   Pulse 84   Resp 16   Blood Glucose 143   Glucose Meter # wc   Pain Level 0         Arrived awake and alert. ABC's intact. Ambulated without assistance. Cleared for treatment by Waylon SCHILLING Tolerated treatment without complaints. Cleared from chamber without incidents. Follow up Wednesday for HBO.           ICD-10-CM ICD-9-CM   1. Chronic osteomyelitis of right foot  M86.671 730.17   2. Acute on chronic osteomyelitis  M86.10 730.00    M86.60 730.10   3. Diabetic ulcer of left midfoot associated with type 2 diabetes mellitus, with bone involvement without  evidence of necrosis  E11.621 250.80    L97.426 707.14   4. Diabetic ulcer of right midfoot associated with type 2 diabetes mellitus, with bone involvement without evidence of necrosis  E11.621 250.80    L97.416 707.14        Physician Supervision  I provided direct supervision and was immediately available to furnish assistance and direction throughout the performance of the procedure.    Emergency Response Team  A trained emergency response team and emergency services were available throughout procedure.

## 2023-02-22 ENCOUNTER — HOSPITAL ENCOUNTER (OUTPATIENT)
Dept: WOUND CARE | Facility: HOSPITAL | Age: 53
Discharge: HOME | End: 2023-02-22
Attending: FAMILY MEDICINE
Payer: MEDICARE

## 2023-02-22 VITALS — TEMPERATURE: 98 F | HEART RATE: 80 BPM | SYSTOLIC BLOOD PRESSURE: 179 MMHG | DIASTOLIC BLOOD PRESSURE: 93 MMHG

## 2023-02-22 DIAGNOSIS — E11.621 DIABETIC ULCER OF LEFT MIDFOOT ASSOCIATED WITH TYPE 2 DIABETES MELLITUS, WITH BONE INVOLVEMENT WITHOUT EVIDENCE OF NECROSIS: Primary | ICD-10-CM

## 2023-02-22 DIAGNOSIS — L97.416 DIABETIC ULCER OF RIGHT MIDFOOT ASSOCIATED WITH TYPE 2 DIABETES MELLITUS, WITH BONE INVOLVEMENT WITHOUT EVIDENCE OF NECROSIS: ICD-10-CM

## 2023-02-22 DIAGNOSIS — L97.426 DIABETIC ULCER OF LEFT MIDFOOT ASSOCIATED WITH TYPE 2 DIABETES MELLITUS, WITH BONE INVOLVEMENT WITHOUT EVIDENCE OF NECROSIS: Primary | ICD-10-CM

## 2023-02-22 DIAGNOSIS — E11.621 DIABETIC ULCER OF LEFT MIDFOOT ASSOCIATED WITH TYPE 2 DIABETES MELLITUS, WITH BONE INVOLVEMENT WITHOUT EVIDENCE OF NECROSIS: ICD-10-CM

## 2023-02-22 DIAGNOSIS — L97.426 DIABETIC ULCER OF LEFT MIDFOOT ASSOCIATED WITH TYPE 2 DIABETES MELLITUS, WITH BONE INVOLVEMENT WITHOUT EVIDENCE OF NECROSIS: ICD-10-CM

## 2023-02-22 DIAGNOSIS — M86.671 CHRONIC OSTEOMYELITIS OF RIGHT FOOT: Primary | ICD-10-CM

## 2023-02-22 DIAGNOSIS — M86.10 ACUTE ON CHRONIC OSTEOMYELITIS: ICD-10-CM

## 2023-02-22 DIAGNOSIS — M86.60 ACUTE ON CHRONIC OSTEOMYELITIS: ICD-10-CM

## 2023-02-22 DIAGNOSIS — E11.621 DIABETIC ULCER OF RIGHT MIDFOOT ASSOCIATED WITH TYPE 2 DIABETES MELLITUS, WITH BONE INVOLVEMENT WITHOUT EVIDENCE OF NECROSIS: ICD-10-CM

## 2023-02-22 LAB
POCT GLUCOSE: 149 MG/DL (ref 70–110)
POCT GLUCOSE: 176 MG/DL (ref 70–110)

## 2023-02-22 PROCEDURE — 82962 GLUCOSE BLOOD TEST: CPT | Mod: 91 | Performed by: FAMILY MEDICINE

## 2023-02-22 PROCEDURE — 99183 PR HYPERBARIC OXYGEN THERAPY ATTENDANCE/SUPERVISION, PER SESSION: ICD-10-PCS | Mod: ,,, | Performed by: FAMILY MEDICINE

## 2023-02-22 PROCEDURE — 99183 HYPERBARIC OXYGEN THERAPY: CPT | Mod: ,,, | Performed by: FAMILY MEDICINE

## 2023-02-22 PROCEDURE — G0277 HBOT, FULL BODY CHAMBER, 30M: HCPCS | Performed by: FAMILY MEDICINE

## 2023-02-22 PROCEDURE — 29581 APPL MULTLAYER CMPRN SYS LEG: CPT

## 2023-02-22 NOTE — PROGRESS NOTES
02/22/23 0900   Hyperbaric Pre-Inspection   Mattress cleaned prior to treatment Yes   2 Patient Identifiers Verified Yes   Consent Obtained Yes   Cotton Gown Yes   Patient voided Yes   Drainage Bags Emptied Not applicable   Patient Pregnant Not applicable   Glasses, Jewelry, Makeup, etc. Removed Yes   Dentures, Hearing Aid, Prosthetic Devices Removed Yes   Touch hair to check for hair spray Yes   Cold/Flu Symptoms No   Diabetic Patient Eaten Yes   Medications Given Not applicable   Fears and apprehensions verbalized Yes   Ground Wire Secured Yes   HBO Treatment Course Details   Treatment Course Number 1   Ordering Provider Waylon SCHILLING   Indications Diabetic wounds of lower extremities   HBO Treatment Start Date 12/19/23   HBO Treatment Details   Treatment Number 40   Inpatient Visit No   Total Treatment Length Calc (minutes) 108   Chamber Type Monoplace   Chamber # 2   HBO Dive Log # 1207   Treatment Protocol 2.0 SURI x 90 minutes w/100% oxygen and no air break   Treatment Details   Dive Rate Down 1.5 psi/minute   Dive Rate Up 2.0 psi/minute   Compress Begins 1 SURI   Clock Time 0927   Tx Pressure Reached 2 SUIR   Clock Time 0937   Decompress Begins 2 SURI   Clock Time 1107   Decompress Ends 1 SURI   Clock Time 1115   Pre HBO Vital Signs   BP (!) 147/85   Pulse 91   Resp 16   Temp 97.7 °F (36.5 °C)   Blood Glucose 176   Glucose Meter # wc   Pain Level 0   Post HBO Vital Signs   BP (!) 179/93   Pulse 80   Resp 16   Blood Glucose 149   Glucose Meter # wc   Pain Level 0         Arrived awake and alert. ABC's intact. Ambulated without assistance. Cleared for treatment by Jennifer SCHILLING Tolerated treatment without complaints. Cleared from chamber without incidents. Follow up tomorrow for HBO.         ICD-10-CM ICD-9-CM   1. Chronic osteomyelitis of right foot  M86.671 730.17   2. Acute on chronic osteomyelitis  M86.10 730.00    M86.60 730.10   3. Diabetic ulcer of left midfoot associated with type 2 diabetes mellitus, with  bone involvement without evidence of necrosis  E11.621 250.80    L97.426 707.14   4. Diabetic ulcer of right midfoot associated with type 2 diabetes mellitus, with bone involvement without evidence of necrosis  E11.621 250.80    L97.416 707.14        Physician Supervision  I provided direct supervision and was immediately available to furnish assistance and direction throughout the performance of the procedure.    Emergency Response Team  A trained emergency response team and emergency services were available throughout procedure.

## 2023-02-22 NOTE — PROGRESS NOTES
[facility] [address]  [address]  Nurse Visit    Subjective:       Patient seen in clinic today for nurse visit, ambulated to exam room per self with prescribed Darco shoe on Right Foot and CAM boot on LLE. No c/o pain at present. Denies fever, chills or flu-like symptoms.  Dressing changed as ordered, Elidia's ring placed around wound bed, U-pad's (x2) placed around bony prominences of plantar foot and Mextra to collect drainage as per previous verbal order by MD. Drainage is causing strike through at plantar aspect.       Assessment:          Wound 04/08/22 1137 Diabetic Ulcer Left plantar;medial Foot (Active)   04/08/22 1137    Pre-existing: Yes   Primary Wound Type: Diabetic ulc   Side: Left   Orientation: plantar;medial   Location: Foot   Wound Number:    Ankle-Brachial Index:    Pulses:    Removal Indication and Assessment:    Wound Outcome:    (Retired) Wound Type:    (Retired) Wound Length (cm):    (Retired) Wound Width (cm):    (Retired) Depth (cm):    Wound Description (Comments):    Removal Indications:    Wound WDL ex 02/22/23 1232   Dressing Appearance Intact;Moist drainage;Area marked 02/22/23 1232   Drainage Amount Large 02/22/23 1232   Drainage Characteristics/Odor Serosanguineous;No odor 02/22/23 1232   Appearance Red;Moist 02/22/23 1232   Tissue loss description Full thickness 02/22/23 1232   Black (%), Wound Tissue Color 0 % 02/22/23 1232   Red (%), Wound Tissue Color 100 % 02/22/23 1232   Yellow (%), Wound Tissue Color 0 % 02/22/23 1232   Periwound Area Dry;Intact 02/22/23 1232   Wound Edges Defined;Open 02/22/23 1232   Care Cleansed with:;Antimicrobial agent;Sterile normal saline 02/22/23 1232   Dressing Changed 02/22/23 1232   Periwound Care Skin barrier film applied;Absorptive dressing applied 02/22/23 1232   Compression Two layer compression 02/22/23 1232   Off Loading Football dressing;Off loading shoe 02/22/23 1232   Dressing Change Due 02/24/23  02/22/23 1232           Plan:     No orders of the defined types were placed in this encounter.          Follow up in about 2 days (around 2/24/2023) for wound care.

## 2023-02-23 ENCOUNTER — HOSPITAL ENCOUNTER (OUTPATIENT)
Dept: WOUND CARE | Facility: HOSPITAL | Age: 53
Discharge: HOME | End: 2023-02-23
Attending: FAMILY MEDICINE
Payer: MEDICARE

## 2023-02-23 DIAGNOSIS — M86.10 ACUTE ON CHRONIC OSTEOMYELITIS: ICD-10-CM

## 2023-02-23 DIAGNOSIS — M86.60 ACUTE ON CHRONIC OSTEOMYELITIS: ICD-10-CM

## 2023-02-23 DIAGNOSIS — L97.416 DIABETIC ULCER OF RIGHT MIDFOOT ASSOCIATED WITH TYPE 2 DIABETES MELLITUS, WITH BONE INVOLVEMENT WITHOUT EVIDENCE OF NECROSIS: ICD-10-CM

## 2023-02-23 DIAGNOSIS — E11.621 DIABETIC ULCER OF LEFT MIDFOOT ASSOCIATED WITH TYPE 2 DIABETES MELLITUS, WITH BONE INVOLVEMENT WITHOUT EVIDENCE OF NECROSIS: ICD-10-CM

## 2023-02-23 DIAGNOSIS — M86.671 CHRONIC OSTEOMYELITIS OF RIGHT FOOT: Primary | ICD-10-CM

## 2023-02-23 DIAGNOSIS — E11.621 DIABETIC ULCER OF RIGHT MIDFOOT ASSOCIATED WITH TYPE 2 DIABETES MELLITUS, WITH BONE INVOLVEMENT WITHOUT EVIDENCE OF NECROSIS: ICD-10-CM

## 2023-02-23 DIAGNOSIS — L97.426 DIABETIC ULCER OF LEFT MIDFOOT ASSOCIATED WITH TYPE 2 DIABETES MELLITUS, WITH BONE INVOLVEMENT WITHOUT EVIDENCE OF NECROSIS: ICD-10-CM

## 2023-02-23 LAB
POCT GLUCOSE: 113 MG/DL (ref 70–110)
POCT GLUCOSE: 144 MG/DL (ref 70–110)

## 2023-02-23 PROCEDURE — 82962 GLUCOSE BLOOD TEST: CPT | Mod: 91 | Performed by: FAMILY MEDICINE

## 2023-02-23 PROCEDURE — G0277 HBOT, FULL BODY CHAMBER, 30M: HCPCS | Performed by: FAMILY MEDICINE

## 2023-02-23 PROCEDURE — 99183 HYPERBARIC OXYGEN THERAPY: CPT | Mod: ,,, | Performed by: FAMILY MEDICINE

## 2023-02-23 PROCEDURE — 99183 PR HYPERBARIC OXYGEN THERAPY ATTENDANCE/SUPERVISION, PER SESSION: ICD-10-PCS | Mod: ,,, | Performed by: FAMILY MEDICINE

## 2023-02-23 NOTE — PROGRESS NOTES
02/23/23 1000   Hyperbaric Pre-Inspection   Mattress cleaned prior to treatment Yes   2 Patient Identifiers Verified Yes   For Inpatients: Verify correct ID band/correct chart Yes   Consent Obtained Yes   Cotton Gown Yes   Patient voided Yes   Drainage Bags Emptied Not applicable   Patient Pregnant Not applicable   Glasses, Jewelry, Makeup, etc. Removed Yes   Dentures, Hearing Aid, Prosthetic Devices Removed Yes   Touch hair to check for hair spray Yes   Cold/Flu Symptoms No   Diabetic Patient Eaten Yes   Medications Given Not applicable   Fears and apprehensions verbalized Yes   Ground Wire Secured Yes   HBO Treatment Course Details   Treatment Course Number 1   Ordering Provider Waylon SCHILLING   Indications Diabetic wounds of lower extremities   HBO Treatment Start Date 12/19/23   HBO Treatment Details   Treatment Number 41   Inpatient Visit No   Total Treatment Length Calc (minutes) 108   Chamber Type Monoplace   Chamber # 2   HBO Dive Log # 1209   Treatment Protocol 2.0 SURI x 90 minutes w/100% oxygen and no air break   Treatment Details   Dive Rate Down 1.5 psi/minute   Dive Rate Up 2.0 psi/minute   Compress Begins 1 SURI   Clock Time 1029   Tx Pressure Reached 2 SURI   Clock Time 1039   Decompress Begins 2 SURI   Clock Time 1209   Decompress Ends 1 SURI   Clock Time 1217   Pre HBO Vital Signs   /65   Pulse 91   Resp 16   Temp 97.7 °F (36.5 °C)   Blood Glucose 144   Glucose Meter # wc   Pain Level 0   Post HBO Vital Signs   BP (!) 156/85   Pulse 86   Resp 16   Blood Glucose 113   Glucose Meter # wc   Pain Level 0         Arrived awake and alert. ABC's intact. Ambulated without assistance. Cleared for treatment by Kira SCHILLING Tolerated treatment without complaints. Cleared from chamber without incidents. Follow up tomorrow for HBO.         ICD-10-CM ICD-9-CM   1. Chronic osteomyelitis of right foot  M86.671 730.17   2. Acute on chronic osteomyelitis  M86.10 730.00    M86.60 730.10   3. Diabetic ulcer of  left midfoot associated with type 2 diabetes mellitus, with bone involvement without evidence of necrosis  E11.621 250.80    L97.426 707.14   4. Diabetic ulcer of right midfoot associated with type 2 diabetes mellitus, with bone involvement without evidence of necrosis  E11.621 250.80    L97.416 707.14        Physician Supervision  I provided direct supervision and was immediately available to furnish assistance and direction throughout the performance of the procedure.    Emergency Response Team  A trained emergency response team and emergency services were available throughout procedure.

## 2023-02-24 ENCOUNTER — HOSPITAL ENCOUNTER (OUTPATIENT)
Dept: WOUND CARE | Facility: HOSPITAL | Age: 53
Discharge: HOME | End: 2023-02-24
Attending: FAMILY MEDICINE
Payer: MEDICARE

## 2023-02-24 ENCOUNTER — HOSPITAL ENCOUNTER (OUTPATIENT)
Dept: WOUND CARE | Facility: HOSPITAL | Age: 53
Discharge: HOME | End: 2023-02-24
Attending: PODIATRIST
Payer: MEDICARE

## 2023-02-24 VITALS — HEART RATE: 95 BPM | SYSTOLIC BLOOD PRESSURE: 137 MMHG | DIASTOLIC BLOOD PRESSURE: 91 MMHG | TEMPERATURE: 97 F

## 2023-02-24 DIAGNOSIS — E11.621 DIABETIC ULCER OF LEFT MIDFOOT ASSOCIATED WITH TYPE 2 DIABETES MELLITUS, WITH BONE INVOLVEMENT WITHOUT EVIDENCE OF NECROSIS: ICD-10-CM

## 2023-02-24 DIAGNOSIS — L97.426 DIABETIC ULCER OF LEFT MIDFOOT ASSOCIATED WITH TYPE 2 DIABETES MELLITUS, WITH BONE INVOLVEMENT WITHOUT EVIDENCE OF NECROSIS: ICD-10-CM

## 2023-02-24 DIAGNOSIS — E11.621 DIABETIC ULCER OF RIGHT FOOT ASSOCIATED WITH TYPE 2 DIABETES MELLITUS, WITH FAT LAYER EXPOSED: Primary | ICD-10-CM

## 2023-02-24 DIAGNOSIS — M86.671 CHRONIC OSTEOMYELITIS OF RIGHT FOOT: Primary | ICD-10-CM

## 2023-02-24 DIAGNOSIS — M86.10 ACUTE ON CHRONIC OSTEOMYELITIS: ICD-10-CM

## 2023-02-24 DIAGNOSIS — L97.416 DIABETIC ULCER OF RIGHT MIDFOOT ASSOCIATED WITH TYPE 2 DIABETES MELLITUS, WITH BONE INVOLVEMENT WITHOUT EVIDENCE OF NECROSIS: ICD-10-CM

## 2023-02-24 DIAGNOSIS — M86.60 ACUTE ON CHRONIC OSTEOMYELITIS: ICD-10-CM

## 2023-02-24 DIAGNOSIS — L97.512 DIABETIC ULCER OF RIGHT FOOT ASSOCIATED WITH TYPE 2 DIABETES MELLITUS, WITH FAT LAYER EXPOSED: Primary | ICD-10-CM

## 2023-02-24 DIAGNOSIS — E11.621 DIABETIC ULCER OF RIGHT MIDFOOT ASSOCIATED WITH TYPE 2 DIABETES MELLITUS, WITH BONE INVOLVEMENT WITHOUT EVIDENCE OF NECROSIS: ICD-10-CM

## 2023-02-24 LAB
POCT GLUCOSE: 131 MG/DL (ref 70–110)
POCT GLUCOSE: 197 MG/DL (ref 70–110)

## 2023-02-24 PROCEDURE — 15275 SKIN SUB GRAFT FACE/NK/HF/G: CPT | Performed by: PODIATRIST

## 2023-02-24 PROCEDURE — 15275 SKIN SUBSTITUTE: ICD-10-PCS | Mod: ,,, | Performed by: PODIATRIST

## 2023-02-24 PROCEDURE — 15271 SKIN SUB GRAFT TRNK/ARM/LEG: CPT | Performed by: PODIATRIST

## 2023-02-24 PROCEDURE — 99499 NO LOS: ICD-10-PCS | Mod: ,,, | Performed by: PODIATRIST

## 2023-02-24 PROCEDURE — 99183 PR HYPERBARIC OXYGEN THERAPY ATTENDANCE/SUPERVISION, PER SESSION: ICD-10-PCS | Mod: ,,, | Performed by: FAMILY MEDICINE

## 2023-02-24 PROCEDURE — G0277 HBOT, FULL BODY CHAMBER, 30M: HCPCS | Performed by: FAMILY MEDICINE

## 2023-02-24 PROCEDURE — 99183 HYPERBARIC OXYGEN THERAPY: CPT | Mod: ,,, | Performed by: FAMILY MEDICINE

## 2023-02-24 PROCEDURE — 99499 UNLISTED E&M SERVICE: CPT | Mod: ,,, | Performed by: PODIATRIST

## 2023-02-24 PROCEDURE — 82962 GLUCOSE BLOOD TEST: CPT | Performed by: FAMILY MEDICINE

## 2023-02-24 PROCEDURE — 15275 SKIN SUB GRAFT FACE/NK/HF/G: CPT | Mod: ,,, | Performed by: PODIATRIST

## 2023-02-24 NOTE — PROGRESS NOTES
02/24/23 1000   Hyperbaric Pre-Inspection   Mattress cleaned prior to treatment Yes   2 Patient Identifiers Verified Yes   Consent Obtained Yes   Cotton Gown Yes   Patient voided Yes   Drainage Bags Emptied Not applicable   Patient Pregnant Not applicable   Glasses, Jewelry, Makeup, etc. Removed Yes   Dentures, Hearing Aid, Prosthetic Devices Removed Yes   Touch hair to check for hair spray Yes   Cold/Flu Symptoms No   Diabetic Patient Eaten Yes   Medications Given Not applicable   Fears and apprehensions verbalized Yes   Ground Wire Secured Yes   HBO Treatment Course Details   Treatment Course Number 1   Ordering Provider Waylon SCHILLING   Indications Diabetic wounds of lower extremities   HBO Treatment Start Date 12/19/23   HBO Treatment Details   Treatment Number 42   Inpatient Visit No   Total Treatment Length Calc (minutes) 118   Chamber Type Monoplace   Chamber # 1   HBO Dive Log # 2780   Treatment Protocol 2.0 SURI x 90 minutes w/100% oxygen and no air break   Treatment Details   Dive Rate Down 1.5 psi/minute   Dive Rate Up 2.0 psi/minute   Compress Begins 1 SURI   Clock Time 1006   Tx Pressure Reached 2 SURI   Clock Time 1016   Decompress Begins 2 SURI   Clock Time 1156   Decompress Ends 1 SURI   Clock Time 1204   Pre HBO Vital Signs   BP (!) 137/91   Pulse 95   Resp 16   Temp 97.2 °F (36.2 °C)   Blood Glucose 197   Glucose Meter # wc   Pain Level 0   Post HBO Vital Signs   BP (!) 167/83   Pulse 82   Resp 16   Blood Glucose 131   Glucose Meter # wc   Pain Level 0           Arrived awake and alert. ABC's intact. Ambulated without assistance. Cleared for treatment by Kira SCHILLING Tolerated treatment without complaints. Cleared from chamber without incidents. Follow up Monday for HBO.         ICD-10-CM ICD-9-CM   1. Diabetic ulcer of right foot associated with type 2 diabetes mellitus, with fat layer exposed  E11.621 250.80    L97.512 707.15   2. Acute on chronic osteomyelitis  M86.10 730.00    M86.60 730.10   3.  Diabetic ulcer of left midfoot associated with type 2 diabetes mellitus, with bone involvement without evidence of necrosis  E11.621 250.80    L97.426 707.14   4. Diabetic ulcer of right midfoot associated with type 2 diabetes mellitus, with bone involvement without evidence of necrosis  E11.621 250.80    L97.416 707.14        Physician Supervision  I provided direct supervision and was immediately available to furnish assistance and direction throughout the performance of the procedure.    Emergency Response Team  A trained emergency response team and emergency services were available throughout procedure.

## 2023-02-24 NOTE — PROGRESS NOTES
[facility]                Progress Note    Subjective:       Patient ID: Indio Ryder Jr. is a 52 y.o. male.    Chief Complaint: Wound Check    Follow up wound care visit. Patient ambulated to exam room with prescribed Darco on Right Foot and CAM boot on LLE. No c/o pain at present. Denies fever, chills or flu-like symptoms. Dressing's intact with a moderate amount of serosanguineous, non-malodor drainage from Left Foot wound.       Review of Systems   Constitutional:  Negative for appetite change, chills, fatigue and fever.   HENT:  Negative for hearing loss.    Eyes:  Negative for photophobia and visual disturbance.   Respiratory:  Negative for cough, chest tightness, shortness of breath and wheezing.    Cardiovascular:  Positive for leg swelling. Negative for chest pain and palpitations.   Gastrointestinal:  Negative for constipation, diarrhea, nausea and vomiting.   Endocrine: Negative for cold intolerance and heat intolerance.   Genitourinary:  Negative for flank pain.   Musculoskeletal:  Positive for gait problem and myalgias. Negative for neck pain and neck stiffness.   Skin:  Positive for wound. Negative for color change.   Neurological:  Positive for numbness. Negative for weakness, light-headedness and headaches.        + paresthesia    Psychiatric/Behavioral:  Negative for sleep disturbance.        Objective:        Physical Exam  Constitutional:       Appearance: He is well-developed.   Cardiovascular:      Comments: Dorsalis pedis and posterior tibial pulses are palpable Skin is atrophic, slightly hyperpigmented, and mildly edematous      Musculoskeletal:         General: No tenderness. Normal range of motion.      Comments: Muscle strength is 5/5 in all groups bilaterally.    S/p partial 5th ray amp b/l    S/p hallux amp right    Fat pad atrophy to heels and met heads bilateral       Skin:     General: Skin is warm and dry.      Coloration: Skin is  not jaundiced, mottled or sallow.      Findings: Wound (see below) present. No abscess or ecchymosis.      Comments:        Neurological:      Mental Status: He is alert and oriented to person, place, and time.      Comments: Pollok-Nenita 5.07 monofilamant testing is diminished Kenji feet. Sharp/dull sensation diminished Bilaterally. Light touch absent Bilaterally.       Psychiatric:         Behavior: Behavior normal.       Vitals:    02/24/23 0906   BP: (!) 137/91   Pulse: 95   Temp: 97.2 °F (36.2 °C)       Assessment:           ICD-10-CM ICD-9-CM   1. Chronic osteomyelitis of right foot  M86.671 730.17   2. Diabetic ulcer of left midfoot associated with type 2 diabetes mellitus, with bone involvement without evidence of necrosis  E11.621 250.80    L97.426 707.14            Wound 04/08/22 1137 Diabetic Ulcer Left plantar;medial Foot (Active)   04/08/22 1137    Pre-existing: Yes   Primary Wound Type: Diabetic ulc   Side: Left   Orientation: plantar;medial   Location: Foot   Wound Number:    Ankle-Brachial Index:    Pulses:    Removal Indication and Assessment:    Wound Outcome:    (Retired) Wound Type:    (Retired) Wound Length (cm):    (Retired) Wound Width (cm):    (Retired) Depth (cm):    Wound Description (Comments):    Removal Indications:    Wound Image   02/24/23 1113   Wound WDL ex 02/24/23 1113   Dressing Appearance Intact;Moist drainage 02/24/23 1113   Drainage Amount Moderate 02/24/23 1113   Drainage Characteristics/Odor Serosanguineous;No odor 02/24/23 1113   Appearance Red;Moist 02/24/23 1113   Tissue loss description Full thickness 02/24/23 1113   Black (%), Wound Tissue Color 0 % 02/24/23 1113   Red (%), Wound Tissue Color 100 % 02/24/23 1113   Yellow (%), Wound Tissue Color 0 % 02/24/23 1113   Periwound Area Macerated;Moist;Pale white 02/24/23 1113   Wound Edges Defined;Open 02/24/23 1113   Wound Length (cm) 1.5 cm 02/24/23 1113   Wound Width (cm) 1.2 cm 02/24/23 1113   Wound Depth (cm) 0.7 cm  02/24/23 1113   Wound Volume (cm^3) 1.26 cm^3 02/24/23 1113   Wound Surface Area (cm^2) 1.8 cm^2 02/24/23 1113   Tunneling (depth (cm)/location) 0 02/24/23 1113   Undermining (depth (cm)/location) 0 02/24/23 1113   Care Cleansed with:;Antimicrobial agent;Sterile normal saline;Debrided 02/24/23 1113   Dressing Changed 02/24/23 1113   Periwound Care Moisture barrier applied;Skin barrier film applied 02/24/23 1113   Compression Two layer compression 02/24/23 1113   Off Loading Football dressing;Off loading shoe 02/24/23 1113   Dressing Change Due 03/03/23 02/24/23 1113           Plan:            Skin substitute    Date/Time: 2/24/2023 8:58 AM  Performed by: Marivel Peterson DPM  Authorized by: Marivel Peterson DPM     Pre-procedure details:     Preparation: Patient was prepped and draped in usual sterile fashion    Anesthesia (see MAR for exact dosages):     Anesthesia method:  None  Procedure details:     Location:  head/hands/feet/digits/genitalia    Product applied:  Neox 100 or clarix 100    Amount used (cm^2):  4    Amount wasted (cm^2):  2    Reason for waste:  Size    Secured: Yes      Secured with:  Prolene  Dressing:     Dressing applied:  Adaptic dressing and Steri-Strips    Wrapped with:  Bulky dressing  Post-procedure details:     Patient tolerance of procedure:  Tolerated well, no immediate complications    Right Foot still appears healed however there Is bleeding in callus layers and palpable bony prominence, will continue to protect.      Tissue pathology and/or culture taken     [] Yes      [x] No  Sharp debridement performed                   [x] Yes       [] No  Labs ordered     [] Yes       [x] No  Imaging ordered    [] Yes      [x] No    Orders Placed This Encounter   Procedures    Skin substitute     This order was created via procedure documentation     Standing Status:   Standing     Number of Occurrences:   1    Change dressing     Clean with NS.   Left Foot: Iodine scrub for 2 minutes.  Cavilon/Benzoin to periwound and plantar foot. Neox sutured in, Adaptic touch, steristrips (per MD). Aquacel Extra sheet (folded once). U-Pad (x 2 laid around bony prominence of Metatarsals). Custom Felt pad (with hole cut to allow for drainage. Mextra short x 1. Abram foam (long x2, laid perpendicular). Football dressing (cast padding x 3). Tubigrip single layer, size E folded over foot and secured with Medipore Tape. CAM boot.   Right Foot: Cavilon/Benzoin to plantar foot. Custom Pad then Abram Foam Long to plantar aspect. Football dressing (cast padding x 3). Tubigrip single layer, size E folded over foot and secured with Medipore Tape. Darco Shoe.     Nurse Visit - Change Left Foot dressing only per MD. Skin Sub in place. Reapply Adaptic touch and steristrips if not adhered. Aquacel Extra sheet (folded once). U-Pad (x 2 laid around bony prominence of Metatarsals). Custom Felt pad (with hole cut to allow for drainage. Mextra short x 1. Abram foam (long x2, laid perpendicular). Football dressing (cast padding x 3). Tubigrip single layer, size E folded over foot and secured with Medipore Tape. CAM boot.        Follow up in about 4 days (around 2/28/2023) for wound care.

## 2023-02-27 ENCOUNTER — HOSPITAL ENCOUNTER (OUTPATIENT)
Dept: WOUND CARE | Facility: HOSPITAL | Age: 53
Discharge: HOME | End: 2023-02-27
Attending: INTERNAL MEDICINE
Payer: MEDICARE

## 2023-02-27 DIAGNOSIS — M86.60 ACUTE ON CHRONIC OSTEOMYELITIS: ICD-10-CM

## 2023-02-27 DIAGNOSIS — L97.416 DIABETIC ULCER OF RIGHT MIDFOOT ASSOCIATED WITH TYPE 2 DIABETES MELLITUS, WITH BONE INVOLVEMENT WITHOUT EVIDENCE OF NECROSIS: ICD-10-CM

## 2023-02-27 DIAGNOSIS — M86.10 ACUTE ON CHRONIC OSTEOMYELITIS: ICD-10-CM

## 2023-02-27 DIAGNOSIS — L97.426 DIABETIC ULCER OF LEFT MIDFOOT ASSOCIATED WITH TYPE 2 DIABETES MELLITUS, WITH BONE INVOLVEMENT WITHOUT EVIDENCE OF NECROSIS: ICD-10-CM

## 2023-02-27 DIAGNOSIS — M86.671 CHRONIC OSTEOMYELITIS OF RIGHT FOOT: Primary | ICD-10-CM

## 2023-02-27 DIAGNOSIS — E11.621 DIABETIC ULCER OF LEFT MIDFOOT ASSOCIATED WITH TYPE 2 DIABETES MELLITUS, WITH BONE INVOLVEMENT WITHOUT EVIDENCE OF NECROSIS: ICD-10-CM

## 2023-02-27 DIAGNOSIS — E11.621 DIABETIC ULCER OF RIGHT MIDFOOT ASSOCIATED WITH TYPE 2 DIABETES MELLITUS, WITH BONE INVOLVEMENT WITHOUT EVIDENCE OF NECROSIS: ICD-10-CM

## 2023-02-27 LAB
POCT GLUCOSE: 150 MG/DL (ref 70–110)
POCT GLUCOSE: 155 MG/DL (ref 70–110)

## 2023-02-27 PROCEDURE — 99183 PR HYPERBARIC OXYGEN THERAPY ATTENDANCE/SUPERVISION, PER SESSION: ICD-10-PCS | Mod: ,,, | Performed by: INTERNAL MEDICINE

## 2023-02-27 PROCEDURE — 82962 GLUCOSE BLOOD TEST: CPT | Mod: 91 | Performed by: INTERNAL MEDICINE

## 2023-02-27 PROCEDURE — G0277 HBOT, FULL BODY CHAMBER, 30M: HCPCS | Performed by: INTERNAL MEDICINE

## 2023-02-27 PROCEDURE — 99183 HYPERBARIC OXYGEN THERAPY: CPT | Mod: ,,, | Performed by: INTERNAL MEDICINE

## 2023-02-28 ENCOUNTER — HOSPITAL ENCOUNTER (OUTPATIENT)
Dept: WOUND CARE | Facility: HOSPITAL | Age: 53
Discharge: HOME | End: 2023-02-28
Attending: FAMILY MEDICINE
Payer: MEDICARE

## 2023-02-28 VITALS — TEMPERATURE: 98 F | HEART RATE: 86 BPM | SYSTOLIC BLOOD PRESSURE: 176 MMHG | DIASTOLIC BLOOD PRESSURE: 91 MMHG

## 2023-02-28 DIAGNOSIS — L97.416 DIABETIC ULCER OF RIGHT MIDFOOT ASSOCIATED WITH TYPE 2 DIABETES MELLITUS, WITH BONE INVOLVEMENT WITHOUT EVIDENCE OF NECROSIS: ICD-10-CM

## 2023-02-28 DIAGNOSIS — E11.621 DIABETIC ULCER OF LEFT MIDFOOT ASSOCIATED WITH TYPE 2 DIABETES MELLITUS, WITH BONE INVOLVEMENT WITHOUT EVIDENCE OF NECROSIS: ICD-10-CM

## 2023-02-28 DIAGNOSIS — E11.621 DIABETIC ULCER OF LEFT FOOT: Primary | ICD-10-CM

## 2023-02-28 DIAGNOSIS — L97.426 DIABETIC ULCER OF LEFT MIDFOOT ASSOCIATED WITH TYPE 2 DIABETES MELLITUS, WITH BONE INVOLVEMENT WITHOUT EVIDENCE OF NECROSIS: ICD-10-CM

## 2023-02-28 DIAGNOSIS — E11.621 DIABETIC ULCER OF RIGHT MIDFOOT ASSOCIATED WITH TYPE 2 DIABETES MELLITUS, WITH BONE INVOLVEMENT WITHOUT EVIDENCE OF NECROSIS: ICD-10-CM

## 2023-02-28 DIAGNOSIS — M86.10 ACUTE ON CHRONIC OSTEOMYELITIS: ICD-10-CM

## 2023-02-28 DIAGNOSIS — L97.529 DIABETIC ULCER OF LEFT FOOT: Primary | ICD-10-CM

## 2023-02-28 DIAGNOSIS — M86.60 ACUTE ON CHRONIC OSTEOMYELITIS: ICD-10-CM

## 2023-02-28 DIAGNOSIS — M86.671 CHRONIC OSTEOMYELITIS OF RIGHT FOOT: Primary | ICD-10-CM

## 2023-02-28 LAB
POCT GLUCOSE: 125 MG/DL (ref 70–110)
POCT GLUCOSE: 210 MG/DL (ref 70–110)

## 2023-02-28 PROCEDURE — 99183 HYPERBARIC OXYGEN THERAPY: CPT | Mod: ,,, | Performed by: FAMILY MEDICINE

## 2023-02-28 PROCEDURE — G0277 HBOT, FULL BODY CHAMBER, 30M: HCPCS | Performed by: FAMILY MEDICINE

## 2023-02-28 PROCEDURE — 99183 PR HYPERBARIC OXYGEN THERAPY ATTENDANCE/SUPERVISION, PER SESSION: ICD-10-PCS | Mod: ,,, | Performed by: FAMILY MEDICINE

## 2023-02-28 PROCEDURE — 82962 GLUCOSE BLOOD TEST: CPT | Performed by: FAMILY MEDICINE

## 2023-02-28 PROCEDURE — 99214 OFFICE O/P EST MOD 30 MIN: CPT

## 2023-02-28 NOTE — PROGRESS NOTES
02/28/23 0930   Hyperbaric Pre-Inspection   Mattress cleaned prior to treatment Yes   2 Patient Identifiers Verified Yes   Consent Obtained Yes   Cotton Gown Yes   Patient voided Yes   Drainage Bags Emptied Not applicable   Patient Pregnant Not applicable   Glasses, Jewelry, Makeup, etc. Removed Yes   Dentures, Hearing Aid, Prosthetic Devices Removed Yes   Touch hair to check for hair spray Yes   Cold/Flu Symptoms No   Diabetic Patient Eaten Yes   Medications Given Not applicable   Fears and apprehensions verbalized Yes   Ground Wire Secured Yes   HBO Treatment Course Details   Treatment Course Number 1   Ordering Provider Waylon SCHILLING   Indications Diabetic wounds of lower extremities   HBO Treatment Start Date 12/19/22   HBO Treatment Details   Treatment Number 44   Inpatient Visit No   Total Treatment Length Calc (minutes) 108   Chamber Type Monoplace   Chamber # 2   HBO Dive Log # 1214   Treatment Protocol 2.0 SURI x 90 minutes w/100% oxygen and no air break   Treatment Details   Dive Rate Down 1.5 psi/minute   Dive Rate Up 2.0 psi/minute   Compress Begins 1 SURI   Clock Time 0952   Tx Pressure Reached 2 SURI   Clock Time 1002   Decompress Begins 2 SURI   Clock Time 1132   Decompress Ends 1 SURI   Clock Time 1140   Pre HBO Vital Signs   BP (!) 176/91   Pulse 86   Resp 18   Temp 97.9 °F (36.6 °C)   Blood Glucose 210   Glucose Meter # wc   Pain Level 0   Post HBO Vital Signs   BP (!) 181/88   Pulse 85   Resp 16   Blood Glucose 125   Glucose Meter # wc   Pain Level 0         Arrived awake and alert. ABC's intact. Ambulated without assistance. Cleared for treatment by Roberto SCHILLING Tolerated treatment without complaints. Cleared from chamber without incidents. Follow up tomorrow for HBO.         ICD-10-CM ICD-9-CM   1. Chronic osteomyelitis of right foot  M86.671 730.17   2. Acute on chronic osteomyelitis  M86.10 730.00    M86.60 730.10   3. Diabetic ulcer of left midfoot associated with type 2 diabetes mellitus, with  bone involvement without evidence of necrosis  E11.621 250.80    L97.426 707.14   4. Diabetic ulcer of right midfoot associated with type 2 diabetes mellitus, with bone involvement without evidence of necrosis  E11.621 250.80    L97.416 707.14        Physician Supervision  I provided direct supervision and was immediately available to furnish assistance and direction throughout the performance of the procedure.    Emergency Response Team  A trained emergency response team and emergency services were available throughout procedure.

## 2023-02-28 NOTE — PROGRESS NOTES
[facility]      Subjective:     Patient seen in clinic today.  Dressing changed as ordered.      Assessment:            ICD-10-CM ICD-9-CM   1. Diabetic ulcer of left foot  E11.621 250.80    L97.529 707.15         Plan:   [unfilled]        No orders of the defined types were placed in this encounter.   [facility]                Progress Note    Subjective:       Patient ID: Indio Ryder Jr. is a 52 y.o. male.    Chief Complaint: Dressing Change    HPI    Review of Systems      Objective:        Physical Exam    Vitals:    02/28/23 0930   BP: (!) 176/91   Pulse: 86   Temp: 97.9 °F (36.6 °C)       Assessment:           ICD-10-CM ICD-9-CM   1. Diabetic ulcer of left foot  E11.621 250.80    L97.529 707.15                Plan:              Tissue pathology and/or culture taken     [] Yes      [x] No  Sharp debridement performed                   [] Yes       [x] No  Labs ordered     [] Yes       [x] No  Imaging ordered    [] Yes      [x] No    No orders of the defined types were placed in this encounter.       No follow-ups on file.

## 2023-03-01 ENCOUNTER — HOSPITAL ENCOUNTER (OUTPATIENT)
Dept: WOUND CARE | Facility: HOSPITAL | Age: 53
Discharge: HOME | End: 2023-03-01
Attending: FAMILY MEDICINE
Payer: MEDICARE

## 2023-03-01 DIAGNOSIS — M86.10 ACUTE ON CHRONIC OSTEOMYELITIS: ICD-10-CM

## 2023-03-01 DIAGNOSIS — M86.671 CHRONIC OSTEOMYELITIS OF RIGHT FOOT: Primary | ICD-10-CM

## 2023-03-01 DIAGNOSIS — E11.621 DIABETIC ULCER OF RIGHT MIDFOOT ASSOCIATED WITH TYPE 2 DIABETES MELLITUS, WITH BONE INVOLVEMENT WITHOUT EVIDENCE OF NECROSIS: ICD-10-CM

## 2023-03-01 DIAGNOSIS — M86.60 ACUTE ON CHRONIC OSTEOMYELITIS: ICD-10-CM

## 2023-03-01 DIAGNOSIS — L97.426 DIABETIC ULCER OF LEFT MIDFOOT ASSOCIATED WITH TYPE 2 DIABETES MELLITUS, WITH BONE INVOLVEMENT WITHOUT EVIDENCE OF NECROSIS: ICD-10-CM

## 2023-03-01 DIAGNOSIS — E11.621 DIABETIC ULCER OF LEFT MIDFOOT ASSOCIATED WITH TYPE 2 DIABETES MELLITUS, WITH BONE INVOLVEMENT WITHOUT EVIDENCE OF NECROSIS: ICD-10-CM

## 2023-03-01 DIAGNOSIS — L97.416 DIABETIC ULCER OF RIGHT MIDFOOT ASSOCIATED WITH TYPE 2 DIABETES MELLITUS, WITH BONE INVOLVEMENT WITHOUT EVIDENCE OF NECROSIS: ICD-10-CM

## 2023-03-01 LAB
POCT GLUCOSE: 115 MG/DL (ref 70–110)
POCT GLUCOSE: 147 MG/DL (ref 70–110)

## 2023-03-01 PROCEDURE — 82962 GLUCOSE BLOOD TEST: CPT | Mod: 91 | Performed by: FAMILY MEDICINE

## 2023-03-01 PROCEDURE — G0277 HBOT, FULL BODY CHAMBER, 30M: HCPCS | Performed by: FAMILY MEDICINE

## 2023-03-01 PROCEDURE — 99183 PR HYPERBARIC OXYGEN THERAPY ATTENDANCE/SUPERVISION, PER SESSION: ICD-10-PCS | Mod: ,,, | Performed by: FAMILY MEDICINE

## 2023-03-01 PROCEDURE — 99183 HYPERBARIC OXYGEN THERAPY: CPT | Mod: ,,, | Performed by: FAMILY MEDICINE

## 2023-03-01 NOTE — PROGRESS NOTES
03/01/23 1000   Hyperbaric Pre-Inspection   Mattress cleaned prior to treatment Yes   2 Patient Identifiers Verified Yes   Consent Obtained Yes   Cotton Gown Yes   Patient voided Yes   Drainage Bags Emptied Not applicable   Patient Pregnant Not applicable   Glasses, Jewelry, Makeup, etc. Removed Yes   Dentures, Hearing Aid, Prosthetic Devices Removed Yes   Touch hair to check for hair spray Yes   Cold/Flu Symptoms No   Diabetic Patient Eaten Yes   Medications Given Not applicable   Fears and apprehensions verbalized Yes   Ground Wire Secured Yes   HBO Treatment Course Details   Treatment Course Number 1   Ordering Provider Waylon SCHILLING   Indications Diabetic wounds of lower extremities   HBO Treatment Start Date 12/19/22   HBO Treatment Details   Treatment Number 45   Inpatient Visit No   Total Treatment Length Calc (minutes) 108   Chamber Type Monoplace   Chamber # 2   HBO Dive Log # 1216   Treatment Protocol 2.0 SURI x 90 minutes w/100% oxygen and no air break   Treatment Details   Dive Rate Down 1.5 psi/minute   Dive Rate Up 2.0 psi/minute   Compress Begins 1 SURI   Clock Time 1015   Tx Pressure Reached 2 SURI   Clock Time 1025   Decompress Begins 2 SURI   Clock Time 1155   Decompress Ends 1 SURI   Clock Time 1203   Pre HBO Vital Signs   /80   Pulse 109   Resp 16   Temp 97.7 °F (36.5 °C)   Blood Glucose 147   Glucose Meter # wc   Pain Level 0   Post HBO Vital Signs   BP (!) 156/93   Pulse 88   Resp 16   Blood Glucose 115   Glucose Meter # wc   Pain Level 0         Arrived awake and alert. ABC's intact. Ambulated without assistance. Cleared for treatment by Jennifer SCHILLING Tolerated treatment without complaints. Cleared from chamber without incidents. Follow up Friday for HBO.       ICD-10-CM ICD-9-CM   1. Chronic osteomyelitis of right foot  M86.671 730.17   2. Acute on chronic osteomyelitis  M86.10 730.00    M86.60 730.10   3. Diabetic ulcer of left midfoot associated with type 2 diabetes mellitus, with bone  involvement without evidence of necrosis  E11.621 250.80    L97.426 707.14   4. Diabetic ulcer of right midfoot associated with type 2 diabetes mellitus, with bone involvement without evidence of necrosis  E11.621 250.80    L97.416 707.14        Physician Supervision  I provided direct supervision and was immediately available to furnish assistance and direction throughout the performance of the procedure.    Emergency Response Team  A trained emergency response team and emergency services were available throughout procedure.

## 2023-03-02 ENCOUNTER — OFFICE VISIT (OUTPATIENT)
Dept: INFECTIOUS DISEASES | Facility: CLINIC | Age: 53
End: 2023-03-02
Payer: MEDICARE

## 2023-03-02 VITALS
HEIGHT: 73 IN | BODY MASS INDEX: 31.85 KG/M2 | DIASTOLIC BLOOD PRESSURE: 89 MMHG | SYSTOLIC BLOOD PRESSURE: 137 MMHG | TEMPERATURE: 98 F | HEART RATE: 97 BPM | WEIGHT: 240.31 LBS

## 2023-03-02 DIAGNOSIS — M86.272 SUBACUTE OSTEOMYELITIS OF LEFT FOOT: Primary | ICD-10-CM

## 2023-03-02 PROCEDURE — 99999 PR PBB SHADOW E&M-EST. PATIENT-LVL III: CPT | Mod: PBBFAC,,, | Performed by: INTERNAL MEDICINE

## 2023-03-02 PROCEDURE — 99214 PR OFFICE/OUTPT VISIT, EST, LEVL IV, 30-39 MIN: ICD-10-PCS | Mod: S$PBB,,, | Performed by: INTERNAL MEDICINE

## 2023-03-02 PROCEDURE — 99999 PR PBB SHADOW E&M-EST. PATIENT-LVL III: ICD-10-PCS | Mod: PBBFAC,,, | Performed by: INTERNAL MEDICINE

## 2023-03-02 PROCEDURE — 99214 OFFICE O/P EST MOD 30 MIN: CPT | Mod: S$PBB,,, | Performed by: INTERNAL MEDICINE

## 2023-03-02 PROCEDURE — 99213 OFFICE O/P EST LOW 20 MIN: CPT | Mod: PBBFAC | Performed by: INTERNAL MEDICINE

## 2023-03-02 NOTE — PROGRESS NOTES
Infectious Disease Clinic Note  03/02/2023       Subjective:       Patient ID: Indio Ryder Jr. is a 52 y.o. male being seen for an new visit.    Chief Complaint: Follow-up    HPI 52M with hx DM and numerous episodes of foot osteo presents for f/up of Lt foot osteomyelitis.    Interval hx:  Last seen on 2/8/23. Currently on cipro and augmentin for pseudomonas (bone and wound cx) and e faecalis (wound cx). Reports med adherence and tolerating well. Denies fevers, chills, nausea, vomiting or diarrhea. Reports wound has been improving despite ambulating excessively during mardi gras. Underwent graft placement on 2/24 by dr. Peterson. Has nurse visits on tuesdays for dressing change, followed wound care clinic on firdays.     Hx:   Pt has been following with podiatry for a non healing Lt foot ulcer that per pt, suddenly worsened. MRI  1/19 revealed: Extensive marrow edema involving the 4th TMT joint extending to the 2nd and 3rd TMT joints contiguous with the overlying soft tissue ulceration, concerning for osteomyelitis.  Underwent bone biopsy of left trochar on 1/20. Bone culture from 1/20 grew pseudomonas. Bone path revealed OM.  Discussed results with patient and started on cipro x 6 wks on 1/29. Subsequent wound culture from 1/27 growing pseudomonas plus e faecalis (R to tetracycline/ S to amp).       Has been undergoing hyperbaric therapy since 12/2022. Had normal ALISSA 11/18/22.       Family History   Adopted: Yes   Problem Relation Age of Onset    Hypertension Mother     Cancer Mother         breast    Diabetes Mother     Hypertension Father     Cataracts Father     Heart disease Father         mi    Diabetes Sister     No Known Problems Brother     Heart disease Sister         MI    No Known Problems Sister     Hypertension Maternal Aunt     No Known Problems Maternal Uncle     No Known Problems Paternal Aunt     No Known Problems Paternal Uncle     No Known Problems Maternal Grandmother     No Known Problems  Maternal Grandfather     No Known Problems Paternal Grandmother     No Known Problems Paternal Grandfather     Amblyopia Neg Hx     Blindness Neg Hx     Glaucoma Neg Hx     Macular degeneration Neg Hx     Retinal detachment Neg Hx     Strabismus Neg Hx     Stroke Neg Hx     Thyroid disease Neg Hx      Social History     Socioeconomic History    Marital status: Single    Number of children: 0   Occupational History    Occupation: Retired     Employer: [employer]   Tobacco Use    Smoking status: Never    Smokeless tobacco: Never   Substance and Sexual Activity    Alcohol use: No    Drug use: No    Sexual activity: Yes     Partners: Female     Birth control/protection: Condom     Past Surgical History:   Procedure Laterality Date    COLONOSCOPY Right 1/19/2022    Procedure: COLONOSCOPY;  Surgeon: Tj Haile MD;  Location: [facility] ENDO (4TH FLR);  Service: Endoscopy;  Laterality: Right;  previous order un-usable/poor prep 1/18/22  RAPID COVID test arrival 12:20  instructions handed to pt- sm    COLONOSCOPY N/A 3/21/2022    Procedure: COLONOSCOPY;  Surgeon: Sheng Haque MD;  Location: [facility] ENDO (2ND FLR);  Service: Endoscopy;  Laterality: N/A;  Colonoscopy with AES for hepatix flexure polyp removal, 2nd floor  Pt is fully vaccinated-DS  Pt prescribed Plavix by Dr. Stahl in Jan. 2022, however pt states that he never started taking it-DS  3/16/22-Instructions sent via portal-DS    DEBRIDEMENT OF FOOT Left 7/14/2019    Procedure: DEBRIDEMENT, FOOT, with left 5th ray amputation;  Surgeon: Sis Hickman DPM;  Location: [facility] OR 2ND [facility];  Service: Podiatry;  Laterality: Left;    DEBRIDEMENT OF FOOT Left 7/17/2019    Procedure: DEBRIDEMENT, FOOT with leftt 5th ray partial amputation with Neox Graft;  Surgeon: Mai Burrell DPM;  Location: [facility] OR [address] [place] [address];  Service: Podiatry;  Laterality: Left;  t    DEBRIDEMENT OF FOOT Bilateral 4/29/2021    Procedure: DEBRIDEMENT, FOOT;  Surgeon: Mai Burrell  DPM;  Location: [facility] OR 1ST FLR;  Service: Podiatry;  Laterality: Bilateral;  Graft application    TOE AMPUTATION Right 06/05/2015    TOE AMPUTATION Right 01/19/2017    XI ROBOTIC COLECTOMY, RIGHT N/A 4/25/2022    Procedure: XI ROBOTIC COLECTOMY, RIGHT (lithotomy, eras low);  Surgeon: Erik Stout MD;  Location: [facility] OR 2ND FLR;  Service: Colon and Rectal;  Laterality: N/A;  Paruch to Goodwin    XI ROBOTIC COLECTOMY, RIGHT  4/25/2022    Procedure: ;  Surgeon: Erik Stout MD;  Location: [facility] OR 2ND FLR;  Service: Colon and Rectal;;       Patient's Medications   New Prescriptions    No medications on file   Previous Medications    ACETAMINOPHEN (TYLENOL) 325 MG TABLET    Take 2 tablets (650 mg total) by mouth every 6 (six) hours as needed.    AMOXICILLIN-CLAVULANATE 875-125MG (AUGMENTIN) 875-125 MG PER TABLET    Take 1 tablet by mouth every 12 (twelve) hours.    ATORVASTATIN (LIPITOR) 80 MG TABLET    Take 1 tablet (80 mg total) by mouth once daily.    CIPROFLOXACIN HCL (CIPRO) 750 MG TABLET    Take 1 tablet (750 mg total) by mouth every 12 (twelve) hours.    DIPHENOXYLATE-ATROPINE 2.5-0.025 MG (LOMOTIL) 2.5-0.025 MG PER TABLET    Take 1 to 2 tablets by mouth three times daily as needed for diarrhea    EMPAGLIFLOZIN (JARDIANCE) 10 MG TABLET    Take 1 tablet (10 mg total) by mouth once daily.    FLUTICASONE PROPIONATE (FLONASE) 50 MCG/ACTUATION NASAL SPRAY    Use 2 sprays (100 mcg total) in each nostril once daily.    INSULIN ASPART U-100 (NOVOLOG) 100 UNIT/ML (3 ML) INPN PEN    Inject 8 units before meals plus scale 150-200+2, 201-250+4, 251-300+6, 301-350+8, >350+10. Max daily 54 units.    INSULIN DETEMIR U-100 (LEVEMIR FLEXTOUCH) 100 UNIT/ML (3 ML) SUBQ INPN PEN    Inject 26 Units into the skin every evening.    ONDANSETRON (ZOFRAN-ODT) 8 MG TBDL    Dissolve 1 tablet (8 mg total) by mouth every 8 (eight) hours as needed (Nausea).    ONETOUCH DELICA LANCETS 33 GAUGE [facility]        ONETOUCH ULTRA2 KIT      "   PANTOPRAZOLE (PROTONIX) 40 MG TABLET    Take 1 tablet (40 mg total) by mouth once daily.    PEN NEEDLE, DIABETIC (BD SASCHA 2ND GEN PEN NEEDLE) 32 GAUGE X 5/32" NDLE    Use 4 times a day    SEMAGLUTIDE (OZEMPIC) 1 MG/DOSE (4 MG/3 ML)    Inject 1 mg into the skin every 7 days.    TAMSULOSIN (FLOMAX) 0.4 MG CAP    Take 2 capsules (0.8 mg total) by mouth once daily. 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, with long-term current use of insulin 03/23/2021    Chronic left shoulder pain 07/30/2020    Allergic reaction due to antibacterial drug 04/05/2020    Subacute osteomyelitis of left foot 03/11/2020    Hypertensive retinopathy, bilateral 01/28/2020    Diabetic ulcer of left midfoot associated with type 2 diabetes mellitus, with bone involvement without evidence of necrosis 08/02/2019    Severe malnutrition 07/22/2019    Diabetic ulcer of right midfoot associated with type 2 diabetes mellitus, with bone involvement without evidence of necrosis 07/12/2019    Neck pain 11/08/2017    Hypokalemia 05/30/2017    Actinomyces infection 01/17/2017     Right foot      Type 2 diabetes mellitus with hyperglycemia, with long-term current use of insulin 05/03/2016    Traumatic amputation of fifth toe of right foot 07/02/2015    Mixed hyperlipidemia 08/12/2014    Essential hypertension 06/06/2013       Review of Systems   Review of Systems   Constitutional:  Negative " "for chills, fever and malaise/fatigue.   Respiratory:  Negative for cough and shortness of breath.    Cardiovascular:  Negative for chest pain and orthopnea.   Gastrointestinal:  Negative for abdominal pain, diarrhea and vomiting.   Genitourinary:  Negative for dysuria.   Musculoskeletal:  Negative for back pain and myalgias.        B/l foot wounds   Neurological:  Positive for sensory change. Negative for weakness.         Objective:      /89 (BP Location: Right arm)   Pulse 97   Temp 98 °F (36.7 °C) (Oral)   Ht 6' 1" (1.854 m)   Wt 109 kg (240 lb 4.8 oz)   BMI 31.70 kg/m²   Estimated body mass index is 31.7 kg/m² as calculated from the following:    Height as of this encounter: 6' 1" (1.854 m).    Weight as of this encounter: 109 kg (240 lb 4.8 oz).    Physical Exam  Vitals and nursing note reviewed.   Constitutional:       Appearance: Normal appearance. He is not ill-appearing.   HENT:      Head: Normocephalic and atraumatic.      Nose: Nose normal. No congestion.      Mouth/Throat:      Mouth: Mucous membranes are moist.      Pharynx: Oropharynx is clear.   Eyes:      Conjunctiva/sclera: Conjunctivae normal.      Pupils: Pupils are equal, round, and reactive to light.   Cardiovascular:      Rate and Rhythm: Normal rate and regular rhythm.   Pulmonary:      Effort: Pulmonary effort is normal. No respiratory distress.      Breath sounds: Normal breath sounds.   Abdominal:      General: Abdomen is flat. There is no distension.      Palpations: Abdomen is soft.   Musculoskeletal:         General: No swelling. Normal range of motion.      Cervical back: Normal range of motion and neck supple.      Comments: Clean dressings in b/l feet  Images reviewed on media tab. He is since s/p graft placement.   Skin:     General: Skin is warm and dry.   Neurological:      General: No focal deficit present.      Mental Status: He is alert and oriented to person, place, and time.   Psychiatric:         Mood and Affect: " Mood normal.         Behavior: Behavior normal.       Assessment:         1. Subacute osteomyelitis of left foot                  Plan:       Osteomyelitis Left foot  Worsening Lt plantar ulcer with deep tunneling concerning for OM. MRI revealed Extensive marrow edema involving the 4th TMT joint extending to the 2nd and 3rd TMT joints contiguous with the overlying soft tissue ulceration, concerning for osteomyelitis. S/p bone biopsy of left trochar on 1/20.  Bone path positive for chronic OM and bone cx grew pseudomonas. Culture from wound drainage 1/27 grew psa and e faecalis.     Plan:  --recent labs reviewed  --continue cipro x 6 wks (last day 3/12/23)  --continue e faecalis- unclear if colonizer, but has numerous past cx growing e faecalis, so will treat x 4 wks (shorter osteo course). 2/8/23- 3/8/23.  --continue hyperbarics, local wound care and f/u with podiatry tomorrow  --went over potential abx side effects  --monitor with cbc, cmp, crp      RTC in 2 wks.    Maria Dolores Tan MD  Infectious Disease     Total professional time spent for the encounter: 30 minutes  Time was spent preparing to see the patient, reviewing results of prior testing, obtaining and/or reviewing separately obtained history, performing a medically appropriate examination and interview, counseling and educating the patient/family, ordering medications/tests/procedures, referring and communicating with other health care professionals, documenting clinical information in the electronic health record, and independently interpreting results.

## 2023-03-03 ENCOUNTER — HOSPITAL ENCOUNTER (OUTPATIENT)
Dept: WOUND CARE | Facility: HOSPITAL | Age: 53
Discharge: HOME | End: 2023-03-03
Attending: INTERNAL MEDICINE
Payer: MEDICARE

## 2023-03-03 ENCOUNTER — HOSPITAL ENCOUNTER (OUTPATIENT)
Dept: WOUND CARE | Facility: HOSPITAL | Age: 53
Discharge: HOME | End: 2023-03-03
Attending: PODIATRIST
Payer: MEDICARE

## 2023-03-03 VITALS — HEART RATE: 92 BPM | DIASTOLIC BLOOD PRESSURE: 73 MMHG | SYSTOLIC BLOOD PRESSURE: 136 MMHG | TEMPERATURE: 98 F

## 2023-03-03 DIAGNOSIS — M86.10 ACUTE ON CHRONIC OSTEOMYELITIS: ICD-10-CM

## 2023-03-03 DIAGNOSIS — L97.529 DIABETIC ULCER OF LEFT FOOT: Primary | ICD-10-CM

## 2023-03-03 DIAGNOSIS — L97.426 DIABETIC ULCER OF LEFT MIDFOOT ASSOCIATED WITH TYPE 2 DIABETES MELLITUS, WITH BONE INVOLVEMENT WITHOUT EVIDENCE OF NECROSIS: ICD-10-CM

## 2023-03-03 DIAGNOSIS — E11.621 DIABETIC ULCER OF LEFT MIDFOOT ASSOCIATED WITH TYPE 2 DIABETES MELLITUS, WITH BONE INVOLVEMENT WITHOUT EVIDENCE OF NECROSIS: ICD-10-CM

## 2023-03-03 DIAGNOSIS — E11.621 DIABETIC ULCER OF RIGHT MIDFOOT ASSOCIATED WITH TYPE 2 DIABETES MELLITUS, WITH BONE INVOLVEMENT WITHOUT EVIDENCE OF NECROSIS: ICD-10-CM

## 2023-03-03 DIAGNOSIS — E11.621 DIABETIC ULCER OF LEFT FOOT: Primary | ICD-10-CM

## 2023-03-03 DIAGNOSIS — L97.416 DIABETIC ULCER OF RIGHT MIDFOOT ASSOCIATED WITH TYPE 2 DIABETES MELLITUS, WITH BONE INVOLVEMENT WITHOUT EVIDENCE OF NECROSIS: ICD-10-CM

## 2023-03-03 DIAGNOSIS — M86.671 CHRONIC OSTEOMYELITIS OF RIGHT FOOT: Primary | ICD-10-CM

## 2023-03-03 DIAGNOSIS — M86.60 ACUTE ON CHRONIC OSTEOMYELITIS: ICD-10-CM

## 2023-03-03 LAB
POCT GLUCOSE: 130 MG/DL (ref 70–110)
POCT GLUCOSE: 182 MG/DL (ref 70–110)

## 2023-03-03 PROCEDURE — G0277 HBOT, FULL BODY CHAMBER, 30M: HCPCS | Performed by: INTERNAL MEDICINE

## 2023-03-03 PROCEDURE — 99214 OFFICE O/P EST MOD 30 MIN: CPT | Performed by: PODIATRIST

## 2023-03-03 PROCEDURE — 82962 GLUCOSE BLOOD TEST: CPT | Mod: 59 | Performed by: INTERNAL MEDICINE

## 2023-03-03 PROCEDURE — 99213 PR OFFICE/OUTPT VISIT, EST, LEVL III, 20-29 MIN: ICD-10-PCS | Mod: ,,, | Performed by: PODIATRIST

## 2023-03-03 PROCEDURE — 99183 PR HYPERBARIC OXYGEN THERAPY ATTENDANCE/SUPERVISION, PER SESSION: ICD-10-PCS | Mod: ,,, | Performed by: INTERNAL MEDICINE

## 2023-03-03 PROCEDURE — 99213 OFFICE O/P EST LOW 20 MIN: CPT | Mod: ,,, | Performed by: PODIATRIST

## 2023-03-03 PROCEDURE — 99183 HYPERBARIC OXYGEN THERAPY: CPT | Mod: ,,, | Performed by: INTERNAL MEDICINE

## 2023-03-03 NOTE — PROGRESS NOTES
[facility]                Progress Note    Subjective:       Patient ID: Indio Ryder Jr. is a 52 y.o. male.    Chief Complaint: Wound Check    Walked to clinic unaided wearing Darco on right foot and CAM boot on left foot. Dsg dry and intact with maceration distal/medial to wound where it is now painted with Gentian Violet as well as small amt maceration immediate cici wound.     Wound Check    Review of Systems   Constitutional:  Negative for appetite change, chills, fatigue and fever.   HENT:  Negative for hearing loss.    Eyes:  Negative for photophobia and visual disturbance.   Respiratory:  Negative for cough, chest tightness, shortness of breath and wheezing.    Cardiovascular:  Positive for leg swelling. Negative for chest pain and palpitations.   Gastrointestinal:  Negative for constipation, diarrhea, nausea and vomiting.   Endocrine: Negative for cold intolerance and heat intolerance.   Genitourinary:  Negative for flank pain.   Musculoskeletal:  Positive for gait problem and myalgias. Negative for neck pain and neck stiffness.   Skin:  Positive for wound. Negative for color change.   Neurological:  Positive for numbness. Negative for weakness, light-headedness and headaches.        + paresthesia    Psychiatric/Behavioral:  Negative for sleep disturbance.        Objective:        Physical Exam  Constitutional:       Appearance: He is well-developed.   Cardiovascular:      Comments: Dorsalis pedis and posterior tibial pulses are palpable Skin is atrophic, slightly hyperpigmented, and mildly edematous      Musculoskeletal:         General: No tenderness. Normal range of motion.      Comments: Muscle strength is 5/5 in all groups bilaterally.    S/p partial 5th ray amp b/l    S/p hallux amp right    Fat pad atrophy to heels and met heads bilateral       Skin:     General: Skin is warm and dry.      Coloration: Skin is not jaundiced, mottled or sallow.       Findings: Wound (see below) present. No abscess or ecchymosis.      Comments:        Neurological:      Mental Status: He is alert and oriented to person, place, and time.      Comments: Beatty-Nenita 5.07 monofilamant testing is diminished Kenji feet. Sharp/dull sensation diminished Bilaterally. Light touch absent Bilaterally.       Psychiatric:         Behavior: Behavior normal.       Vitals:    03/03/23 0908   BP: 136/73   Pulse: 92   Temp: 97.7 °F (36.5 °C)       Assessment:           ICD-10-CM ICD-9-CM   1. Diabetic ulcer of left foot  E11.621 250.80    L97.529 707.15            Wound 04/08/22 1137 Diabetic Ulcer Left plantar;medial Foot (Active)   04/08/22 1137    Pre-existing: Yes   Primary Wound Type: Diabetic ulc   Side: Left   Orientation: plantar;medial   Location: Foot   Wound Number:    Ankle-Brachial Index:    Pulses:    Removal Indication and Assessment:    Wound Outcome:    (Retired) Wound Type:    (Retired) Wound Length (cm):    (Retired) Wound Width (cm):    (Retired) Depth (cm):    Wound Description (Comments):    Removal Indications:    Wound Image   03/03/23 0952   Wound WDL ex 03/03/23 0952   Dressing Appearance Dry;Intact 03/03/23 0952   Drainage Amount Scant 03/03/23 0952   Drainage Characteristics/Odor Serosanguineous;No odor 03/03/23 0952   Appearance Red 03/03/23 0952   Tissue loss description Partial thickness 03/03/23 0952   Red (%), Wound Tissue Color 100 % 03/03/23 0952   Periwound Area Dry 03/03/23 0952   Wound Edges Defined 03/03/23 0952   Wound Length (cm) 1.5 cm 03/03/23 0952   Wound Width (cm) 1.2 cm 03/03/23 0952   Wound Surface Area (cm^2) 1.8 cm^2 03/03/23 0952   Care Cleansed with:;Antimicrobial agent;Sterile normal saline 03/03/23 0952   Dressing Changed 03/03/23 0952   Compression Tubular elasticized bandage 03/03/23 0952   Off Loading Football dressing;Off loading shoe 03/03/23 0952   Dressing Change Due 03/10/23 03/03/23 0952           Plan:            Sutures remain in  place, graft still incorporating, continue same dsg.     Padding to all bony prominence     R foot wrapped for protection but no wound seen.     Neox on next encounter    To HBO following our encounter, Refused AVS.    Tissue pathology and/or culture taken     [] Yes      [x] No  Sharp debridement performed                   [] Yes       [x] No  Labs ordered     [] Yes       [x] No  Imaging ordered    [] Yes      [x] No    Orders Placed This Encounter   Procedures    Change dressing     Left Foot:. Cavilon/Benzoin to periwound and plantar foot. Neox remains sutured in, Adaptic touch, steristrips (per MD). Aquacel Extra sheet (folded once). U-Pad (x laid around bony prominence of Metatarsals). Custom Felt pad (with hole cut to allow for drainage. Mextra short x 1. Abram foam (long x2, laid perpendicular). Football dressing (cast padding x 3). Tubigrip single layer, size E folded over foot and secured with Medipore Tape. CAM boot.   Right Foot: Cavilon/Benzoin to plantar foot. Custom Pad then Abram Foam Long to plantar aspect. Football dressing (cast padding x 3). Tubigrip single layer, size E folded over foot and secured with Medipore Tape. Darco Shoe.     Nurse Visit - Change Left Foot dressing only per MD. Skin Sub in place. Reapply Adaptic touch and steristrips if not adhered. Aquacel Extra sheet (folded once). U-Pad (x 2 laid around bony prominence of Metatarsals). Custom Felt pad (with hole cut to allow for drainage. Mextra short x 1. Abram foam (long x2, laid perpendicular). Football dressing (cast padding x 3). Tubigrip single layer, size E folded over foot and secured with Medipore Tape. CAM boot.        Follow up in about 1 week (around 3/10/2023).

## 2023-03-03 NOTE — PROGRESS NOTES
03/03/23 0945   Hyperbaric Pre-Inspection   Mattress cleaned prior to treatment Yes   2 Patient Identifiers Verified Yes   Consent Obtained Yes   Cotton Gown Yes   Patient voided Yes   Drainage Bags Emptied Not applicable   Patient Pregnant Not applicable   Glasses, Jewelry, Makeup, etc. Removed Yes   Dentures, Hearing Aid, Prosthetic Devices Removed Yes   Touch hair to check for hair spray Yes   Cold/Flu Symptoms No   Diabetic Patient Eaten Yes   Medications Given Not applicable   Fears and apprehensions verbalized Yes   Ground Wire Secured Yes   HBO Treatment Course Details   Treatment Course Number 1   Ordering Provider Waylon SCHILLING   Indications Diabetic wounds of lower extremities   HBO Treatment Start Date 12/19/22   HBO Treatment Details   Treatment Number 47   Inpatient Visit No   Total Treatment Length Calc (minutes) 108   Chamber Type Monoplace   Chamber # 1   HBO Dive Log # [id number]   Treatment Protocol 2.0 SURI x 90 minutes w/100% oxygen and no air break   Treatment Details   Dive Rate Down 1.5 psi/minute   Dive Rate Up 2.0 psi/minute   Compress Begins 1 SURI   Clock Time 0953   Tx Pressure Reached 2 SURI   Clock Time 1003   Decompress Begins 2 SURI   Clock Time 1133   Decompress Ends 1 SURI   Clock Time 1141   Pre HBO Vital Signs   /73   Pulse 90   Resp 18   Temp 97.7 °F (36.5 °C)   Blood Glucose 182   Glucose Meter # wc   Pain Level 0   Post HBO Vital Signs   BP (!) 169/98   Pulse 88   Resp 18   Blood Glucose 130   Pain Level 0         Arrived awake and alert. ABC's intact. Ambulated without assistance. Cleared for treatment by Waylon SCHILLING Tolerated treatment without complaints. Cleared from chamber without incidents. Follow up tomorrow for HBO.         ICD-10-CM ICD-9-CM   1. Chronic osteomyelitis of right foot  M86.671 730.17   2. Acute on chronic osteomyelitis  M86.10 730.00    M86.60 730.10   3. Diabetic ulcer of left midfoot associated with type 2 diabetes mellitus, with bone involvement  without evidence of necrosis  E11.621 250.80    L97.426 707.14   4. Diabetic ulcer of right midfoot associated with type 2 diabetes mellitus, with bone involvement without evidence of necrosis  E11.621 250.80    L97.416 707.14        Physician Supervision  I provided direct supervision and was immediately available to furnish assistance and direction throughout the performance of the procedure.    Emergency Response Team  A trained emergency response team and emergency services were available throughout procedure.

## 2023-03-05 ENCOUNTER — PATIENT MESSAGE (OUTPATIENT)
Dept: INTERNAL MEDICINE | Facility: CLINIC | Age: 53
End: 2023-03-05
Payer: MEDICARE

## 2023-03-07 ENCOUNTER — HOSPITAL ENCOUNTER (OUTPATIENT)
Dept: WOUND CARE | Facility: HOSPITAL | Age: 53
Discharge: HOME | End: 2023-03-07
Attending: FAMILY MEDICINE
Payer: MEDICARE

## 2023-03-07 VITALS — HEART RATE: 99 BPM | TEMPERATURE: 98 F | DIASTOLIC BLOOD PRESSURE: 83 MMHG | SYSTOLIC BLOOD PRESSURE: 129 MMHG

## 2023-03-07 DIAGNOSIS — L97.426 DIABETIC ULCER OF LEFT MIDFOOT ASSOCIATED WITH TYPE 2 DIABETES MELLITUS, WITH BONE INVOLVEMENT WITHOUT EVIDENCE OF NECROSIS: ICD-10-CM

## 2023-03-07 DIAGNOSIS — E11.621 DIABETIC ULCER OF LEFT MIDFOOT ASSOCIATED WITH TYPE 2 DIABETES MELLITUS, WITH BONE INVOLVEMENT WITHOUT EVIDENCE OF NECROSIS: Primary | ICD-10-CM

## 2023-03-07 DIAGNOSIS — M86.10 ACUTE ON CHRONIC OSTEOMYELITIS: ICD-10-CM

## 2023-03-07 DIAGNOSIS — M86.60 ACUTE ON CHRONIC OSTEOMYELITIS: ICD-10-CM

## 2023-03-07 DIAGNOSIS — E11.621 DIABETIC ULCER OF RIGHT MIDFOOT ASSOCIATED WITH TYPE 2 DIABETES MELLITUS, WITH NECROSIS OF BONE: ICD-10-CM

## 2023-03-07 DIAGNOSIS — L97.512 DIABETIC ULCER OF RIGHT FOOT ASSOCIATED WITH TYPE 2 DIABETES MELLITUS, WITH FAT LAYER EXPOSED: Primary | ICD-10-CM

## 2023-03-07 DIAGNOSIS — L97.426 DIABETIC ULCER OF LEFT MIDFOOT ASSOCIATED WITH TYPE 2 DIABETES MELLITUS, WITH BONE INVOLVEMENT WITHOUT EVIDENCE OF NECROSIS: Primary | ICD-10-CM

## 2023-03-07 DIAGNOSIS — E11.621 DIABETIC ULCER OF RIGHT FOOT ASSOCIATED WITH TYPE 2 DIABETES MELLITUS, WITH FAT LAYER EXPOSED: Primary | ICD-10-CM

## 2023-03-07 DIAGNOSIS — L97.414 DIABETIC ULCER OF RIGHT MIDFOOT ASSOCIATED WITH TYPE 2 DIABETES MELLITUS, WITH NECROSIS OF BONE: ICD-10-CM

## 2023-03-07 DIAGNOSIS — E11.621 DIABETIC ULCER OF LEFT MIDFOOT ASSOCIATED WITH TYPE 2 DIABETES MELLITUS, WITH BONE INVOLVEMENT WITHOUT EVIDENCE OF NECROSIS: ICD-10-CM

## 2023-03-07 LAB
POCT GLUCOSE: 132 MG/DL (ref 70–110)
POCT GLUCOSE: 175 MG/DL (ref 70–110)

## 2023-03-07 PROCEDURE — G0277 HBOT, FULL BODY CHAMBER, 30M: HCPCS | Performed by: FAMILY MEDICINE

## 2023-03-07 PROCEDURE — 82962 GLUCOSE BLOOD TEST: CPT

## 2023-03-07 PROCEDURE — 99183 PR HYPERBARIC OXYGEN THERAPY ATTENDANCE/SUPERVISION, PER SESSION: ICD-10-PCS | Mod: ,,, | Performed by: FAMILY MEDICINE

## 2023-03-07 PROCEDURE — 99183 HYPERBARIC OXYGEN THERAPY: CPT | Mod: ,,, | Performed by: FAMILY MEDICINE

## 2023-03-07 PROCEDURE — 99212 OFFICE O/P EST SF 10 MIN: CPT

## 2023-03-07 PROCEDURE — 82962 GLUCOSE BLOOD TEST: CPT | Performed by: FAMILY MEDICINE

## 2023-03-07 NOTE — PROGRESS NOTES
[facility]  [address]  [address]  Nurse Visit    Subjective:       Patient seen in clinic today for nurse visit. Patient ambulated to exam room unaided with prescribed Darco shoe on Right Foot and CAM boot on LLE. No c/o pain at present. Small blister (0.6 cm diameter) noted 1.5 cm above wound bed and to medial aspect of foot, he does report walking more over the weekend.  Dressing changed as ordered.      Assessment:          Wound 04/08/22 1137 Diabetic Ulcer Left plantar;medial Foot (Active)   04/08/22 1137    Pre-existing: Yes   Primary Wound Type: Diabetic ulc   Side: Left   Orientation: plantar;medial   Location: Foot   Wound Number:    Ankle-Brachial Index:    Pulses:    Removal Indication and Assessment:    Wound Outcome:    (Retired) Wound Type:    (Retired) Wound Length (cm):    (Retired) Wound Width (cm):    (Retired) Depth (cm):    Wound Description (Comments):    Removal Indications:    Wound WDL ex 03/07/23 0949   Dressing Appearance Intact;Moist drainage 03/07/23 0949   Drainage Amount Moderate 03/07/23 0949   Drainage Characteristics/Odor Serosanguineous;No odor 03/07/23 0949   Appearance Sutures intact 03/07/23 0949   Tissue loss description Full thickness 03/07/23 0949   Periwound Area Macerated;Moist;Pale white 03/07/23 0949   Wound Edges Open 03/07/23 0949   Care Cleansed with:;Antimicrobial agent;Sterile normal saline 03/07/23 0949   Dressing Changed 03/07/23 0949   Periwound Care Skin barrier film applied 03/07/23 0949   Compression Tubular elasticized bandage 03/07/23 0949   Off Loading Football dressing;Off loading shoe 03/07/23 0949   Dressing Change Due 03/10/23 03/07/23 0949           Plan:     No orders of the defined types were placed in this encounter.          Follow up in about 3 days (around 3/10/2023) for wound care.

## 2023-03-07 NOTE — PROGRESS NOTES
03/07/23 0930   Hyperbaric Pre-Inspection   Mattress cleaned prior to treatment Yes   2 Patient Identifiers Verified Yes   Consent Obtained Yes   Cotton Gown Yes   Patient voided Yes   Drainage Bags Emptied Not applicable   Patient Pregnant Yes   Glasses, Jewelry, Makeup, etc. Removed Yes   Dentures, Hearing Aid, Prosthetic Devices Removed Yes   Touch hair to check for hair spray Yes   Cold/Flu Symptoms No   Diabetic Patient Eaten Yes   Medications Given Not applicable   Fears and apprehensions verbalized Yes   Ground Wire Secured Yes   HBO Treatment Course Details   Treatment Course Number 1   Ordering Provider FEDERICO Benoit Diabetic wounds of lower extremities   HBO Treatment Start Date 12/19/22   HBO Treatment Details   Treatment Number 48   Inpatient Visit No   Total Treatment Length Calc (minutes) 108   Chamber Type Monoplace   Chamber # 2   HBO Dive Log # [id number]   Treatment Protocol 2.0 SURI x 90 minutes w/100% oxygen and no air break   Treatment Details   Dive Rate Down 2.0 psi/minute   Dive Rate Up 2.0 psi/minute   Compress Begins 1 SRUI   Clock Time 0948   Tx Pressure Reached 2 SURI   Clock Time 0958   Decompress Begins 2 SURI   Clock Time 1128   Decompress Ends 1 SURI   Clock Time 1136   Pre HBO Vital Signs   /83   Pulse 99   Resp 16   Temp 97.7 °F (36.5 °C)   Blood Glucose 175   Glucose Meter # wc   Pain Level 0   Post HBO Vital Signs   BP (!) 163/87   Pulse 86   Resp 16   Blood Glucose 132   Pain Level 0           Arrived awake and alert. ABC's intact.Ambulated without assistance.  Cleared for treatment by Roberto SCHILLING Tolerated treatment without complaints. Cleared from chamber without incidents. Follow up tomorrow for HBO.           ICD-10-CM ICD-9-CM   1. Diabetic ulcer of right foot associated with type 2 diabetes mellitus, with fat layer exposed  E11.621 250.80    L97.512 707.15   2. Acute on chronic osteomyelitis  M86.10 730.00    M86.60 730.10   3. Diabetic ulcer of left midfoot  associated with type 2 diabetes mellitus, with bone involvement without evidence of necrosis  E11.621 250.80    L97.426 707.14   4. Diabetic ulcer of right midfoot associated with type 2 diabetes mellitus, with necrosis of bone  E11.621 250.80    L97.414 707.14     730.17            Physician Supervision  I provided direct supervision and was immediately available to furnish assistance and direction throughout the performance of the procedure.    Emergency Response Team  A trained emergency response team and emergency services were available throughout procedure.

## 2023-03-08 ENCOUNTER — HOSPITAL ENCOUNTER (OUTPATIENT)
Dept: WOUND CARE | Facility: HOSPITAL | Age: 53
Discharge: HOME | End: 2023-03-08
Attending: FAMILY MEDICINE
Payer: MEDICARE

## 2023-03-08 DIAGNOSIS — M86.10 ACUTE ON CHRONIC OSTEOMYELITIS: ICD-10-CM

## 2023-03-08 DIAGNOSIS — E11.621 DIABETIC ULCER OF RIGHT MIDFOOT ASSOCIATED WITH TYPE 2 DIABETES MELLITUS, WITH NECROSIS OF BONE: ICD-10-CM

## 2023-03-08 DIAGNOSIS — E11.621 DIABETIC ULCER OF LEFT MIDFOOT ASSOCIATED WITH TYPE 2 DIABETES MELLITUS, WITH BONE INVOLVEMENT WITHOUT EVIDENCE OF NECROSIS: ICD-10-CM

## 2023-03-08 DIAGNOSIS — M86.671 CHRONIC OSTEOMYELITIS OF RIGHT FOOT: Primary | ICD-10-CM

## 2023-03-08 DIAGNOSIS — L97.426 DIABETIC ULCER OF LEFT MIDFOOT ASSOCIATED WITH TYPE 2 DIABETES MELLITUS, WITH BONE INVOLVEMENT WITHOUT EVIDENCE OF NECROSIS: ICD-10-CM

## 2023-03-08 DIAGNOSIS — M86.60 ACUTE ON CHRONIC OSTEOMYELITIS: ICD-10-CM

## 2023-03-08 DIAGNOSIS — L97.414 DIABETIC ULCER OF RIGHT MIDFOOT ASSOCIATED WITH TYPE 2 DIABETES MELLITUS, WITH NECROSIS OF BONE: ICD-10-CM

## 2023-03-08 LAB
POCT GLUCOSE: 118 MG/DL (ref 70–110)
POCT GLUCOSE: 155 MG/DL (ref 70–110)

## 2023-03-08 PROCEDURE — G0277 HBOT, FULL BODY CHAMBER, 30M: HCPCS | Performed by: FAMILY MEDICINE

## 2023-03-08 PROCEDURE — 82962 GLUCOSE BLOOD TEST: CPT | Mod: 91 | Performed by: FAMILY MEDICINE

## 2023-03-08 PROCEDURE — 99183 PR HYPERBARIC OXYGEN THERAPY ATTENDANCE/SUPERVISION, PER SESSION: ICD-10-PCS | Mod: ,,, | Performed by: FAMILY MEDICINE

## 2023-03-08 PROCEDURE — 99183 HYPERBARIC OXYGEN THERAPY: CPT | Mod: ,,, | Performed by: FAMILY MEDICINE

## 2023-03-08 NOTE — PROGRESS NOTES
03/08/23 1000   Hyperbaric Pre-Inspection   Mattress cleaned prior to treatment Yes   2 Patient Identifiers Verified Yes   Consent Obtained Yes   Cotton Gown Yes   Patient voided Yes   Drainage Bags Emptied Not applicable   Patient Pregnant Not applicable   Glasses, Jewelry, Makeup, etc. Removed Yes   Dentures, Hearing Aid, Prosthetic Devices Removed Yes   Touch hair to check for hair spray Yes   Cold/Flu Symptoms No   Diabetic Patient Eaten Yes   Medications Given Not applicable   Fears and apprehensions verbalized Yes   Ground Wire Secured Yes   HBO Treatment Course Details   Treatment Course Number 1   Ordering Provider Waylon SCHILLING   Indications Diabetic wounds of lower extremities   HBO Treatment Start Date 12/19/22   HBO Treatment Details   Treatment Number 49   Inpatient Visit No   Total Treatment Length Calc (minutes) 108   Chamber Type Monoplace   Chamber # 2   HBO Dive Log # 1218   Treatment Protocol 2.0 SURI x 90 minutes w/100% oxygen and no air break   Treatment Details   Dive Rate Down 1.5 psi/minute   Dive Rate Up 2.0 psi/minute   Compress Begins 1 SURI   Clock Time 1015   Tx Pressure Reached 2 SURI   Clock Time 1025   Decompress Begins 2 SURI   Clock Time 1155   Decompress Ends 1 SURI   Clock Time 1203   Pre HBO Vital Signs   BP (!) 147/78   Pulse 96   Resp 16   Temp 97.7 °F (36.5 °C)   Blood Glucose 155   Glucose Meter # wc   Pain Level 0   Post HBO Vital Signs   BP (!) 177/90   Pulse 84   Resp 16   Blood Glucose 118   Glucose Meter # wc   Pain Level 0         Arrived awake and alert. ABC's intact. Ambulated without assistance. Cleared for treatment by Jennifer SCHILLING Tolerated treatment without complaints. Cleared from chamber without incidents. Follow up tomorrow for HBO.         ICD-10-CM ICD-9-CM   1. Chronic osteomyelitis of right foot  M86.671 730.17   2. Acute on chronic osteomyelitis  M86.10 730.00    M86.60 730.10   3. Diabetic ulcer of left midfoot associated with type 2 diabetes mellitus, with  bone involvement without evidence of necrosis  E11.621 250.80    L97.426 707.14   4. 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    Emergency Response Team  A trained emergency response team and emergency services were available throughout procedure.

## 2023-03-09 ENCOUNTER — HOSPITAL ENCOUNTER (OUTPATIENT)
Dept: WOUND CARE | Facility: HOSPITAL | Age: 53
Discharge: HOME | End: 2023-03-09
Attending: FAMILY MEDICINE
Payer: MEDICARE

## 2023-03-09 DIAGNOSIS — L97.426 DIABETIC ULCER OF LEFT MIDFOOT ASSOCIATED WITH TYPE 2 DIABETES MELLITUS, WITH BONE INVOLVEMENT WITHOUT EVIDENCE OF NECROSIS: ICD-10-CM

## 2023-03-09 DIAGNOSIS — M86.60 ACUTE ON CHRONIC OSTEOMYELITIS: ICD-10-CM

## 2023-03-09 DIAGNOSIS — M86.10 ACUTE ON CHRONIC OSTEOMYELITIS: ICD-10-CM

## 2023-03-09 DIAGNOSIS — E11.621 DIABETIC ULCER OF LEFT MIDFOOT ASSOCIATED WITH TYPE 2 DIABETES MELLITUS, WITH BONE INVOLVEMENT WITHOUT EVIDENCE OF NECROSIS: ICD-10-CM

## 2023-03-09 DIAGNOSIS — L97.414 DIABETIC ULCER OF RIGHT MIDFOOT ASSOCIATED WITH TYPE 2 DIABETES MELLITUS, WITH NECROSIS OF BONE: ICD-10-CM

## 2023-03-09 DIAGNOSIS — M86.671 CHRONIC OSTEOMYELITIS OF RIGHT FOOT: Primary | ICD-10-CM

## 2023-03-09 DIAGNOSIS — E11.621 DIABETIC ULCER OF RIGHT MIDFOOT ASSOCIATED WITH TYPE 2 DIABETES MELLITUS, WITH NECROSIS OF BONE: ICD-10-CM

## 2023-03-09 LAB
POCT GLUCOSE: 140 MG/DL (ref 70–110)
POCT GLUCOSE: 145 MG/DL (ref 70–110)

## 2023-03-09 PROCEDURE — 82962 GLUCOSE BLOOD TEST: CPT | Performed by: FAMILY MEDICINE

## 2023-03-09 PROCEDURE — 99183 PR HYPERBARIC OXYGEN THERAPY ATTENDANCE/SUPERVISION, PER SESSION: ICD-10-PCS | Mod: ,,, | Performed by: FAMILY MEDICINE

## 2023-03-09 PROCEDURE — G0277 HBOT, FULL BODY CHAMBER, 30M: HCPCS | Performed by: FAMILY MEDICINE

## 2023-03-09 PROCEDURE — 99183 HYPERBARIC OXYGEN THERAPY: CPT | Mod: ,,, | Performed by: FAMILY MEDICINE

## 2023-03-09 NOTE — PROGRESS NOTES
03/09/23 1000   Hyperbaric Pre-Inspection   Mattress cleaned prior to treatment Yes   2 Patient Identifiers Verified Yes   Consent Obtained Yes   Cotton Gown Yes   Patient voided Yes   Drainage Bags Emptied Not applicable   Patient Pregnant Not applicable   Glasses, Jewelry, Makeup, etc. Removed Yes   Dentures, Hearing Aid, Prosthetic Devices Removed Yes   Touch hair to check for hair spray Yes   Cold/Flu Symptoms No   Diabetic Patient Eaten Yes   Medications Given Not applicable   Fears and apprehensions verbalized Yes   Ground Wire Secured Yes   HBO Treatment Course Details   Treatment Course Number 1   Ordering Provider Waylon SCHILLING   Indications Diabetic wounds of lower extremities   HBO Treatment Start Date 12/19/22   HBO Treatment Details   Treatment Number 50   Inpatient Visit No   Total Treatment Length Calc (minutes) 108   Chamber Type Monoplace   Chamber # 2   Treatment Protocol 2.0 SURI x 90 minutes w/100% oxygen and no air break   Treatment Details   Dive Rate Down 1.5 psi/minute   Dive Rate Up 2.0 psi/minute   Compress Begins 1 SURI   Clock Time 1025   Tx Pressure Reached 2 SURI   Clock Time 1035   Decompress Begins 2 SURI   Clock Time 1205   Decompress Ends 1 SURI   Clock Time 1213   Pre HBO Vital Signs   /72   Pulse 99   Resp 16   Temp 97.7 °F (36.5 °C)   Blood Glucose 145   Glucose Meter # wc   Pain Level 0   Post HBO Vital Signs   BP (!) 148/96   Pulse 84   Resp 16   Blood Glucose 140   Glucose Meter # wc   Pain Level 0         Arrived awake and alert. ABC's intact. Ambulated without assistance. Cleared for treatment by Ben.JOHNNY Tolerated treatment without complaints. Cleared from chamber without incidents. Follow up tomorrow for HBO.         ICD-10-CM ICD-9-CM   1. Chronic osteomyelitis of right foot  M86.671 730.17   2. Acute on chronic osteomyelitis  M86.10 730.00    M86.60 730.10   3. Diabetic ulcer of left midfoot associated with type 2 diabetes mellitus, with bone involvement without  evidence of necrosis  E11.621 250.80    L97.426 707.14   4. 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    Emergency Response Team  A trained emergency response team and emergency services were available throughout procedure.

## 2023-03-10 ENCOUNTER — HOSPITAL ENCOUNTER (OUTPATIENT)
Dept: WOUND CARE | Facility: HOSPITAL | Age: 53
Discharge: HOME | End: 2023-03-10
Attending: PODIATRIST
Payer: MEDICARE

## 2023-03-10 ENCOUNTER — HOSPITAL ENCOUNTER (OUTPATIENT)
Dept: WOUND CARE | Facility: HOSPITAL | Age: 53
Discharge: HOME | End: 2023-03-10
Attending: INTERNAL MEDICINE
Payer: MEDICARE

## 2023-03-10 VITALS — HEART RATE: 95 BPM | DIASTOLIC BLOOD PRESSURE: 90 MMHG | SYSTOLIC BLOOD PRESSURE: 161 MMHG | TEMPERATURE: 98 F

## 2023-03-10 DIAGNOSIS — M86.671 CHRONIC OSTEOMYELITIS OF RIGHT FOOT: Primary | ICD-10-CM

## 2023-03-10 DIAGNOSIS — M86.60 ACUTE ON CHRONIC OSTEOMYELITIS: ICD-10-CM

## 2023-03-10 DIAGNOSIS — L97.426 DIABETIC ULCER OF LEFT MIDFOOT ASSOCIATED WITH TYPE 2 DIABETES MELLITUS, WITH BONE INVOLVEMENT WITHOUT EVIDENCE OF NECROSIS: ICD-10-CM

## 2023-03-10 DIAGNOSIS — M86.10 ACUTE ON CHRONIC OSTEOMYELITIS: ICD-10-CM

## 2023-03-10 DIAGNOSIS — E11.621 DIABETIC ULCER OF LEFT MIDFOOT ASSOCIATED WITH TYPE 2 DIABETES MELLITUS, WITH BONE INVOLVEMENT WITHOUT EVIDENCE OF NECROSIS: ICD-10-CM

## 2023-03-10 DIAGNOSIS — L97.414 DIABETIC ULCER OF RIGHT MIDFOOT ASSOCIATED WITH TYPE 2 DIABETES MELLITUS, WITH NECROSIS OF BONE: ICD-10-CM

## 2023-03-10 DIAGNOSIS — E11.621 DIABETIC ULCER OF RIGHT MIDFOOT ASSOCIATED WITH TYPE 2 DIABETES MELLITUS, WITH NECROSIS OF BONE: ICD-10-CM

## 2023-03-10 LAB
POCT GLUCOSE: 125 MG/DL (ref 70–110)
POCT GLUCOSE: 131 MG/DL (ref 70–110)

## 2023-03-10 PROCEDURE — 15275 SKIN SUB GRAFT FACE/NK/HF/G: CPT | Performed by: PODIATRIST

## 2023-03-10 PROCEDURE — 99499 NO LOS: ICD-10-PCS | Mod: ,,, | Performed by: PODIATRIST

## 2023-03-10 PROCEDURE — 99499 UNLISTED E&M SERVICE: CPT | Mod: ,,, | Performed by: PODIATRIST

## 2023-03-10 PROCEDURE — 15275 SKIN SUB GRAFT FACE/NK/HF/G: CPT | Mod: ,,, | Performed by: PODIATRIST

## 2023-03-10 PROCEDURE — 15275 SKIN SUBSTITUTE: ICD-10-PCS | Mod: ,,, | Performed by: PODIATRIST

## 2023-03-10 PROCEDURE — 15271 SKIN SUB GRAFT TRNK/ARM/LEG: CPT | Performed by: PODIATRIST

## 2023-03-10 PROCEDURE — 99183 PR HYPERBARIC OXYGEN THERAPY ATTENDANCE/SUPERVISION, PER SESSION: ICD-10-PCS | Mod: ,,, | Performed by: INTERNAL MEDICINE

## 2023-03-10 PROCEDURE — 99183 HYPERBARIC OXYGEN THERAPY: CPT | Mod: ,,, | Performed by: INTERNAL MEDICINE

## 2023-03-10 NOTE — PROGRESS NOTES
03/10/23 0900   Hyperbaric Pre-Inspection   Mattress cleaned prior to treatment Yes   2 Patient Identifiers Verified Yes   Consent Obtained Yes   Cotton Gown Yes   Patient voided Yes   Drainage Bags Emptied Not applicable   Patient Pregnant Not applicable   Glasses, Jewelry, Makeup, etc. Removed Yes   Dentures, Hearing Aid, Prosthetic Devices Removed Yes   Touch hair to check for hair spray Yes   Cold/Flu Symptoms No   Diabetic Patient Eaten Yes   Medications Given Not applicable   Fears and apprehensions verbalized Yes   Ground Wire Secured Yes   HBO Treatment Course Details   Treatment Course Number 1   Ordering Provider Waylon SCHILLING   Indications Diabetic wounds of lower extremities   HBO Treatment Start Date 12/29/22   HBO Treatment Details   Treatment Number 51   Inpatient Visit No   Total Treatment Length Calc (minutes) 108   Chamber Type Monoplace   Chamber # 1   HBO Dive Log # 2788   Treatment Protocol 2.0 SURI x 90 minutes w/100% oxygen and no air break   Treatment Details   Dive Rate Down 1.5 psi/minute   Dive Rate Up 2.0 psi/minute   Compress Begins 1 SURI   Clock Time 1024   Tx Pressure Reached 2 SURI   Clock Time 1034   Decompress Begins 2 SURI   Clock Time 1204   Decompress Ends 1 SURI   Clock Time 1212   Pre HBO Vital Signs   BP (!) 161/90   Pulse 95   Resp 16   Temp 97.8 °F (36.6 °C)   Blood Glucose 125   Glucose Meter # wb   Action Taken gave pt a strawberry Boost   Pain Level 0   Post HBO Vital Signs   BP (!) 142/92   Pulse 87   Resp 16   Blood Glucose 131   Glucose Meter # wc   Pain Level 0           Arrived awake and alert. ABC's intact. Ambulated without assistance. Cleared for treatment by Waylon SCHILLING Tolerated treatment without complaints. Cleared from chamber without incidents. Follow up Monday for HBO.         ICD-10-CM ICD-9-CM   1. Chronic osteomyelitis of right foot  M86.671 730.17   2. Acute on chronic osteomyelitis  M86.10 730.00    M86.60 730.10   3. Diabetic ulcer of left midfoot  associated with type 2 diabetes mellitus, with bone involvement without evidence of necrosis  E11.621 250.80    L97.426 707.14   4. Diabetic ulcer of right midfoot associated with type 2 diabetes mellitus, with necrosis of bone  E11.621 250.80    L97.414 707.14     730.17        Physician Supervision  I provided direct supervision and was immediately available to furnish assistance and direction throughout the performance of the procedure.    Emergency Response Team  A trained emergency response team and emergency services were available throughout procedure.

## 2023-03-10 NOTE — PROGRESS NOTES
[facility]                Progress Note    Subjective:       Patient ID: Indio Ryder Jr. is a 52 y.o. male.    Chief Complaint: Wound Check    Follow up wound care visit. Patient ambulated to exam room with prescribed Darco on Right Foot and CAM boot on LLE. No c/o pain at present. Denies fever, chills or flu-like symptoms. Dressing's intact with a moderate amount of serosanguineous, non-malodor drainage from Left Foot wound.       Review of Systems   Constitutional:  Negative for appetite change, chills, fatigue and fever.   HENT:  Negative for hearing loss.    Eyes:  Negative for photophobia and visual disturbance.   Respiratory:  Negative for cough, chest tightness, shortness of breath and wheezing.    Cardiovascular:  Positive for leg swelling. Negative for chest pain and palpitations.   Gastrointestinal:  Negative for constipation, diarrhea, nausea and vomiting.   Endocrine: Negative for cold intolerance and heat intolerance.   Genitourinary:  Negative for flank pain.   Musculoskeletal:  Positive for gait problem and myalgias. Negative for neck pain and neck stiffness.   Skin:  Positive for wound. Negative for color change.   Neurological:  Positive for numbness. Negative for weakness, light-headedness and headaches.        + paresthesia    Psychiatric/Behavioral:  Negative for sleep disturbance.        Objective:        Physical Exam  Constitutional:       Appearance: He is well-developed.   Cardiovascular:      Comments: Dorsalis pedis and posterior tibial pulses are palpable Skin is atrophic, slightly hyperpigmented, and mildly edematous      Musculoskeletal:         General: No tenderness. Normal range of motion.      Comments: Muscle strength is 5/5 in all groups bilaterally.    S/p partial 5th ray amp b/l    S/p hallux amp right    Fat pad atrophy to heels and met heads bilateral       Skin:     General: Skin is warm and dry.      Coloration: Skin is  not jaundiced, mottled or sallow.      Findings: Wound (see below) present. No abscess or ecchymosis.      Comments:        Neurological:      Mental Status: He is alert and oriented to person, place, and time.      Comments: Makoti-Nenita 5.07 monofilamant testing is diminished Kenji feet. Sharp/dull sensation diminished Bilaterally. Light touch absent Bilaterally.       Psychiatric:         Behavior: Behavior normal.       Vitals:    03/10/23 0916   BP: (!) 161/90   Pulse: 95   Temp: 97.8 °F (36.6 °C)       Assessment:           ICD-10-CM ICD-9-CM   1. Chronic osteomyelitis of right foot  M86.671 730.17   2. Diabetic ulcer of left midfoot associated with type 2 diabetes mellitus, with bone involvement without evidence of necrosis  E11.621 250.80    L97.426 707.14            Altered Skin Integrity 03/10/23 0930 Left midline;plantar Foot #2 Blister(s) (Active)   03/10/23 0930   Altered Skin Integrity Present on Admission - Did Patient arrive to the hospital with altered skin?: yes   Side: Left   Orientation: midline;plantar   Location: Foot   Wound Number: #2   Is this injury device related?: No   Primary Wound Type: Blister(s)   Description of Altered Skin Integrity:    Ankle-Brachial Index:    Pulses:    Removal Indication and Assessment:    Wound Outcome:    (Retired) Wound Length (cm):    (Retired) Wound Width (cm):    (Retired) Depth (cm):    Wound Description (Comments):    Removal Indications:    Wound Image   03/10/23 0918   Description of Altered Skin Integrity Partial thickness tissue loss. Shallow open ulcer with a red or pink wound bed, without slough. Intact or Open/Ruptured Serum-filled blister. 03/10/23 0918   Dressing Appearance Intact;Moist drainage 03/10/23 0918   Drainage Amount Small 03/10/23 0918   Drainage Characteristics/Odor Serous;No odor 03/10/23 0918   Appearance Pink;Moist 03/10/23 0918   Tissue loss description Partial thickness 03/10/23 0918   Black (%), Wound Tissue Color 0 % 03/10/23  0918   Red (%), Wound Tissue Color 100 % 03/10/23 0918   Yellow (%), Wound Tissue Color 0 % 03/10/23 0918   Periwound Area Macerated;Moist;Pale white 03/10/23 0918   Wound Edges Defined;Open 03/10/23 0918   Wound Length (cm) 0.4 cm 03/10/23 0918   Wound Width (cm) 0.4 cm 03/10/23 0918   Wound Depth (cm) 0.1 cm 03/10/23 0918   Wound Volume (cm^3) 0.016 cm^3 03/10/23 0918   Wound Surface Area (cm^2) 0.16 cm^2 03/10/23 0918   Tunneling (depth (cm)/location) 0 03/10/23 0918   Undermining (depth (cm)/location) 0 03/10/23 0918   Care Cleansed with:;Antimicrobial agent;Sterile normal saline;Sutures removed 03/10/23 0918   Dressing Changed 03/10/23 0918   Periwound Care Skin barrier film applied 03/10/23 0918   Compression Tubular elasticized bandage 03/10/23 0918   Dressing Change Due 03/14/23 03/10/23 0918            (Retired) Wound 04/08/22 1137 Diabetic Ulcer Left plantar;lateral Foot (Active)   04/08/22 1137    Pre-existing: Yes   Primary Wound Type: Diabetic ulc   Side: Left   Orientation: plantar;lateral   Location: Foot   Wound Number:    Ankle-Brachial Index:    Pulses:    Removal Indication and Assessment:    Wound Outcome:    (Retired) Wound Type:    (Retired) Wound Length (cm):    (Retired) Wound Width (cm):    (Retired) Depth (cm):    Wound Description (Comments):    Removal Indications:    Wound Image   03/10/23 0918   Wound WDL ex 03/10/23 0918   Dressing Appearance Intact;Moist drainage 03/10/23 0918   Drainage Amount Moderate 03/10/23 0918   Drainage Characteristics/Odor Serosanguineous;No odor 03/10/23 0918   Appearance Red;Sutures intact 03/10/23 0918   Tissue loss description Full thickness 03/10/23 0918   Black (%), Wound Tissue Color 0 % 03/10/23 0918   Red (%), Wound Tissue Color 100 % 03/10/23 0918   Yellow (%), Wound Tissue Color 0 % 03/10/23 0918   Periwound Area Macerated;Moist;Pale white 03/10/23 0918   Wound Edges Defined;Open 03/10/23 0918   Wound Length (cm) 0.8 cm 03/10/23 0918   Wound Width  (cm) 1.3 cm 03/10/23 0918   Wound Depth (cm) 0.4 cm 03/10/23 0918   Wound Volume (cm^3) 0.416 cm^3 03/10/23 0918   Wound Surface Area (cm^2) 1.04 cm^2 03/10/23 0918   Tunneling (depth (cm)/location) 0 03/10/23 0918   Undermining (depth (cm)/location) 0 03/10/23 0918   Care Cleansed with:;Antimicrobial agent;Sterile normal saline 03/10/23 0918   Dressing Changed 03/10/23 0918   Periwound Care Skin barrier film applied 03/10/23 0918   Compression Tubular elasticized bandage 03/10/23 0918   Off Loading Football dressing;Off loading shoe 03/10/23 0918   Dressing Change Due 03/14/23 03/10/23 0918           Plan:                Right Foot healed and no longer requires full dressing, U-Pad's and patients own tennis shoe with  (to accommodate for LLE in [facility] boot) t RLE.     Neox applied to Left Foot wounds. Patient to return on Tuesday for Nurse Visit.     Skin substitute    Date/Time: 3/10/2023 9:00 AM  Performed by: Marivel Peterson DPM  Authorized by: Marivel Peterson DPM     Consent:     Consent obtained:  Written    Consent given by:  Patient  Pre-procedure details:     Preparation: Patient was prepped and draped in usual sterile fashion    Anesthesia (see MAR for exact dosages):     Anesthesia method:  None  Procedure details:     Location:  head/hands/feet/digits/genitalia    Product applied:  Neox 100 or clarix 100    Amount used (cm^2):  2    Amount wasted (cm^2):  4    Secured: Yes      Secured with:  Prolene  Dressing:     Dressing applied:  Steri-Strips and Adaptic dressing    Wrapped with:  Bulky dressing  Post-procedure details:     Patient tolerance of procedure:  Tolerated well, no immediate complications     Tissue pathology and/or culture taken     [] Yes      [x] No  Sharp debridement performed                   [x] Yes       [] No  Labs ordered     [] Yes       [x] No  Imaging ordered    [] Yes      [x] No    Orders Placed This Encounter   Procedures    Skin substitute     This order was created via  procedure documentation     Standing Status:   Standing     Number of Occurrences:   1    Change dressing     Remove old dressing.   Clean with NS. Sutures removed. Iodine scrub for 2 minutes. Cavilon/Benzoin to periwound. Neox secured with sutures, Adaptic touch secured with steri-strips (per MD). U-Pad's (x 2 laid around bony prominences of Metatarsals). Custom Felt pad (with hole cut to allow for drainage. Aquacel Extra (bustled over hole of felt pad).  Mextra short x 1. Abram foam (long x 2, laid perpendicular). Football dressing (cast padding x 3). Tubigrip size E, single layer with additional length to lay beneath foot like sock. CAM boot.    Right Foot: Cavilon/Benzoin. U-Pads (x 2) around bony prominences. Tubigrip, single layer, size E with toes out. Patient will wear tennis shoe with .     Change LLE ONLY at Nurse Visit: Neox in place. Leave Adaptic Touch in place, Benzoin then reapply steri-strips (if not adhered). U-Pad's (x 2 laid around bony prominences of Metatarsals). Custom Felt pad (with hole cut to allow for drainage. Aquacel Extra (bustled over hole of felt pad).  Mextra short x 1. Abram foam (long x 2, laid perpendicular). Football dressing (cast padding x 3). Tubigrip size E, single layer with additional length to lay beneath foot like sock. CAM boot.        Follow up in about 4 days (around 3/14/2023) for wound care.

## 2023-03-14 ENCOUNTER — HOSPITAL ENCOUNTER (OUTPATIENT)
Dept: WOUND CARE | Facility: HOSPITAL | Age: 53
Discharge: HOME | End: 2023-03-14
Attending: FAMILY MEDICINE
Payer: MEDICARE

## 2023-03-14 VITALS — SYSTOLIC BLOOD PRESSURE: 145 MMHG | DIASTOLIC BLOOD PRESSURE: 87 MMHG | TEMPERATURE: 98 F | HEART RATE: 113 BPM

## 2023-03-14 DIAGNOSIS — M86.10 ACUTE ON CHRONIC OSTEOMYELITIS: Primary | ICD-10-CM

## 2023-03-14 DIAGNOSIS — L97.426 DIABETIC ULCER OF LEFT MIDFOOT ASSOCIATED WITH TYPE 2 DIABETES MELLITUS, WITH BONE INVOLVEMENT WITHOUT EVIDENCE OF NECROSIS: ICD-10-CM

## 2023-03-14 DIAGNOSIS — M86.60 ACUTE ON CHRONIC OSTEOMYELITIS: Primary | ICD-10-CM

## 2023-03-14 DIAGNOSIS — E11.621 DIABETIC ULCER OF LEFT MIDFOOT ASSOCIATED WITH TYPE 2 DIABETES MELLITUS, WITH BONE INVOLVEMENT WITHOUT EVIDENCE OF NECROSIS: ICD-10-CM

## 2023-03-14 PROCEDURE — 99212 OFFICE O/P EST SF 10 MIN: CPT

## 2023-03-14 NOTE — PROGRESS NOTES
"[facility]  [address]  [address]  Nurse Visit    Subjective:       Patient seen in clinic today. Patient ambulated to exam room unaided with prescribed CAM boot on RLE and own tennis shoe with lift on Left Foot. He reports feeling "under the weather" with nausea, denies fever, chills or diarrhea at present. Declines going to Urgent Care or the ER at present, patient did not have HBO therapy yesterday or today due to feeling ill. Dressing changed as ordered, Adaptic Touch remained in-place, reinforced with new Steri-Strips after applying Benzoin.      Assessment:          Altered Skin Integrity 03/10/23 0930 Left midline;plantar Foot #2 Blister(s) (Active)   03/10/23 0930   Altered Skin Integrity Present on Admission - Did Patient arrive to the hospital with altered skin?: yes   Side: Left   Orientation: midline;plantar   Location: Foot   Wound Number: #2   Is this injury device related?: No   Primary Wound Type: Blister(s)   Description of Altered Skin Integrity:    Ankle-Brachial Index:    Pulses:    Removal Indication and Assessment:    Wound Outcome:    (Retired) Wound Length (cm):    (Retired) Wound Width (cm):    (Retired) Depth (cm):    Wound Description (Comments):    Removal Indications:    Description of Altered Skin Integrity Partial thickness tissue loss. Shallow open ulcer with a red or pink wound bed, without slough. Intact or Open/Ruptured Serum-filled blister. 03/14/23 1107   Dressing Appearance Intact;Moist drainage 03/14/23 1107   Drainage Amount Moderate 03/14/23 1107   Drainage Characteristics/Odor Serosanguineous;Brown;No odor 03/14/23 1107   Appearance Steri-strips intact 03/14/23 1107   Tissue loss description Partial thickness 03/14/23 1107   Care Cleansed with:;Antimicrobial agent;Sterile normal saline;Applied:;Steri-strips 03/14/23 1107   Dressing Changed 03/14/23 1107   Compression Tubular elasticized bandage 03/14/23 1107   Off Loading Football " dressing;Off loading shoe 03/14/23 1107   Dressing Change Due 03/17/23 03/14/23 1107            Wound 04/08/22 1137 Diabetic Ulcer Left plantar;medial Foot (Active)   04/08/22 1137    Pre-existing: Yes   Primary Wound Type: Diabetic ulc   Side: Left   Orientation: plantar;medial   Location: Foot   Wound Number:    Ankle-Brachial Index:    Pulses:    Removal Indication and Assessment:    Wound Outcome:    (Retired) Wound Type:    (Retired) Wound Length (cm):    (Retired) Wound Width (cm):    (Retired) Depth (cm):    Wound Description (Comments):    Removal Indications:    Wound WDL ex 03/14/23 1107   Dressing Appearance Intact;Moist drainage 03/14/23 1107   Drainage Amount Moderate 03/14/23 1107   Drainage Characteristics/Odor Serosanguineous;Brown;No odor 03/14/23 1107   Appearance Steri-strips intact 03/14/23 1107   Tissue loss description Full thickness 03/14/23 1107   Care Cleansed with:;Antimicrobial agent;Sterile normal saline;Applied:;Steri-strips 03/14/23 1107   Dressing Changed 03/14/23 1107   Periwound Care Skin barrier film applied;Absorptive dressing applied 03/14/23 1107   Compression Tubular elasticized bandage 03/14/23 1107   Off Loading Football dressing;Off loading shoe 03/14/23 1107   Dressing Change Due 03/17/23 03/14/23 1107           Plan:     No orders of the defined types were placed in this encounter.          Follow up in about 3 days (around 3/17/2023) for wound care.

## 2023-03-17 ENCOUNTER — HOSPITAL ENCOUNTER (OUTPATIENT)
Dept: WOUND CARE | Facility: HOSPITAL | Age: 53
Discharge: HOME | End: 2023-03-17
Attending: PODIATRIST
Payer: MEDICARE

## 2023-03-17 VITALS — HEART RATE: 98 BPM | DIASTOLIC BLOOD PRESSURE: 78 MMHG | SYSTOLIC BLOOD PRESSURE: 153 MMHG | TEMPERATURE: 98 F

## 2023-03-17 DIAGNOSIS — M86.10 ACUTE ON CHRONIC OSTEOMYELITIS: ICD-10-CM

## 2023-03-17 DIAGNOSIS — E11.621 DIABETIC ULCER OF LEFT MIDFOOT ASSOCIATED WITH TYPE 2 DIABETES MELLITUS, WITH BONE INVOLVEMENT WITHOUT EVIDENCE OF NECROSIS: Primary | ICD-10-CM

## 2023-03-17 DIAGNOSIS — M86.60 ACUTE ON CHRONIC OSTEOMYELITIS: ICD-10-CM

## 2023-03-17 DIAGNOSIS — L97.426 DIABETIC ULCER OF LEFT MIDFOOT ASSOCIATED WITH TYPE 2 DIABETES MELLITUS, WITH BONE INVOLVEMENT WITHOUT EVIDENCE OF NECROSIS: Primary | ICD-10-CM

## 2023-03-17 PROCEDURE — 99213 PR OFFICE/OUTPT VISIT, EST, LEVL III, 20-29 MIN: ICD-10-PCS | Mod: ,,, | Performed by: PODIATRIST

## 2023-03-17 PROCEDURE — 99213 OFFICE O/P EST LOW 20 MIN: CPT | Mod: ,,, | Performed by: PODIATRIST

## 2023-03-17 PROCEDURE — 99213 OFFICE O/P EST LOW 20 MIN: CPT | Performed by: PODIATRIST

## 2023-03-17 NOTE — PROGRESS NOTES
[facility]                Progress Note    Subjective:       Patient ID: Indio Ryder Jr. is a 52 y.o. male.    Chief Complaint: Wound Check    Returns to clinic for follow up of left foot wound. Patient ambulated to exam room with prescribed CAM boot on LLE. No c/o pain at present. Denies fever, chills or flu-like symptoms. Dressing intact with a small amount of serosanguineous, non-malodor drainage from Left Foot wound.      Review of Systems   Constitutional:  Negative for appetite change, chills, fatigue and fever.   HENT:  Negative for hearing loss.    Eyes:  Negative for photophobia and visual disturbance.   Respiratory:  Negative for cough, chest tightness, shortness of breath and wheezing.    Cardiovascular:  Positive for leg swelling. Negative for chest pain and palpitations.   Gastrointestinal:  Negative for constipation, diarrhea, nausea and vomiting.   Endocrine: Negative for cold intolerance and heat intolerance.   Genitourinary:  Negative for flank pain.   Musculoskeletal:  Positive for gait problem and myalgias. Negative for neck pain and neck stiffness.   Skin:  Positive for wound. Negative for color change.   Neurological:  Positive for numbness. Negative for weakness, light-headedness and headaches.        + paresthesia    Psychiatric/Behavioral:  Negative for sleep disturbance.        Objective:        Physical Exam  Constitutional:       Appearance: He is well-developed.   Cardiovascular:      Comments: Dorsalis pedis and posterior tibial pulses are palpable Skin is atrophic, slightly hyperpigmented, and mildly edematous      Musculoskeletal:         General: No tenderness. Normal range of motion.      Comments: Muscle strength is 5/5 in all groups bilaterally.    S/p partial 5th ray amp b/l    S/p hallux amp right    Fat pad atrophy to heels and met heads bilateral       Skin:     General: Skin is warm and dry.      Coloration: Skin is not  jaundiced, mottled or sallow.      Findings: Wound (see below) present. No abscess or ecchymosis.      Comments:        Neurological:      Mental Status: He is alert and oriented to person, place, and time.      Comments: Devers-Nenita 5.07 monofilamant testing is diminished Kenji feet. Sharp/dull sensation diminished Bilaterally. Light touch absent Bilaterally.       Psychiatric:         Behavior: Behavior normal.       Vitals:    03/17/23 0911   BP: (!) 153/78   Pulse: 98   Temp: 98.2 °F (36.8 °C)       Assessment:           ICD-10-CM ICD-9-CM   1. Diabetic ulcer of left midfoot associated with type 2 diabetes mellitus, with bone involvement without evidence of necrosis  E11.621 250.80    L97.426 707.14   2. Acute on chronic osteomyelitis  M86.10 730.00    M86.60 730.10            Altered Skin Integrity 03/10/23 0930 Left midline;plantar Foot #2 Blister(s) (Active)   03/10/23 0930   Altered Skin Integrity Present on Admission - Did Patient arrive to the hospital with altered skin?: yes   Side: Left   Orientation: midline;plantar   Location: Foot   Wound Number: #2   Is this injury device related?: No   Primary Wound Type: Blister(s)   Description of Altered Skin Integrity:    Ankle-Brachial Index:    Pulses:    Removal Indication and Assessment:    Wound Outcome:    (Retired) Wound Length (cm):    (Retired) Wound Width (cm):    (Retired) Depth (cm):    Wound Description (Comments):    Removal Indications:    Wound Image   03/17/23 1123   Dressing Appearance Intact;Dried drainage 03/17/23 1123   Drainage Amount Small 03/17/23 1123   Drainage Characteristics/Odor Serosanguineous;No odor 03/17/23 1123   Appearance Epithelialization 03/17/23 1123   Tissue loss description Not applicable 03/17/23 1123   Wound Length (cm) 0 cm 03/17/23 1123   Wound Width (cm) 0 cm 03/17/23 1123   Wound Depth (cm) 0 cm 03/17/23 1123   Wound Volume (cm^3) 0 cm^3 03/17/23 1123   Wound Surface Area (cm^2) 0 cm^2 03/17/23 1123   Tunneling  (depth (cm)/location) 0 03/17/23 1123   Undermining (depth (cm)/location) 0 03/17/23 1123   Care Cleansed with:;Antimicrobial agent;Sterile normal saline 03/17/23 1123   Dressing Changed 03/17/23 1123   Off Loading Football dressing;Off loading shoe 03/17/23 1123   Dressing Change Due 03/21/23 03/17/23 1123            (Retired) Wound 04/08/22 1137 Diabetic Ulcer Left plantar;lateral Foot (Active)   04/08/22 1137    Pre-existing: Yes   Primary Wound Type: Diabetic ulc   Side: Left   Orientation: plantar;lateral   Location: Foot   Wound Number:    Ankle-Brachial Index:    Pulses:    Removal Indication and Assessment:    Wound Outcome:    (Retired) Wound Type:    (Retired) Wound Length (cm):    (Retired) Wound Width (cm):    (Retired) Depth (cm):    Wound Description (Comments):    Removal Indications:    Wound Image   03/17/23 1123   Wound WDL ex 03/17/23 1123   Dressing Appearance Intact;Dried drainage 03/17/23 1123   Drainage Amount Small 03/17/23 1123   Drainage Characteristics/Odor Serosanguineous;No odor 03/17/23 1123   Appearance Red;Moist;Sutures intact 03/17/23 1123   Tissue loss description Full thickness 03/17/23 1123   Periwound Area Dry 03/17/23 1123   Wound Edges Defined;Open 03/17/23 1123   Wound Length (cm) 0.5 cm 03/17/23 1123   Wound Width (cm) 1.3 cm 03/17/23 1123   Wound Surface Area (cm^2) 0.65 cm^2 03/17/23 1123   Care Cleansed with:;Antimicrobial agent;Sterile normal saline 03/17/23 1123   Dressing Changed 03/17/23 1123   Periwound Care Skin barrier film applied 03/17/23 1123   Compression Tubular elasticized bandage 03/17/23 1123   Off Loading Football dressing;Off loading shoe 03/17/23 1123   Dressing Change Due 03/21/23 03/17/23 1123           Plan:            Left Plantar Foot blister has a thin layer of epithelization.   Left Lateral Plantar Foot wound is measuring smaller with sutures still intact. Neox in place Patient to return on Tuesday for Nurse Visit. Neox reapplication scheduled for  next visit    Tissue pathology and/or culture taken     [] Yes      [x] No  Sharp debridement performed                   [] Yes       [x] No  Labs ordered     [] Yes       [x] No  Imaging ordered    [] Yes      [x] No    Orders Placed This Encounter   Procedures    Change dressing     Remove old dressing.   Clean with NS. Cavilon/Benzoin to periwound. Adaptic touch secured with steri-strips. U-Pad's (x 2 laid around bony prominences of Metatarsals). Custom Felt pad (with hole cut to allow for drainage). Aquacel Extra (bustled over hole of felt pad).  Mextra short x 1. Abram foam (long x 2, laid perpendicular). Football dressing (cast padding x 3). Tubigrip size E, single layer with additional length to lay beneath foot like sock. CAM boot.     Right Foot: Cavilon/Benzoin. U-Pads (x 2) around bony prominences. Tubigrip, single layer, size E with toes out. Patient's own tennis shoe with .       Change LLE ONLY at Nurse Visit: Neox in place. Leave Adaptic Touch in place, Benzoin then reapply steri-strips (if not adhered). U-Pad's (x 2 laid around bony prominences of Metatarsals). Custom Felt pad (with hole cut to allow for drainage). Aquacel Extra (bustled over hole of felt pad).  Mextra short x 1. Abram foam (long x 2, laid perpendicular). Football dressing (cast padding x 3). Tubigrip size E, single layer with additional length to lay beneath foot like sock. CAM boot.        Follow up in about 4 days (around 3/21/2023) for wound care.

## 2023-03-20 ENCOUNTER — HOSPITAL ENCOUNTER (OUTPATIENT)
Dept: WOUND CARE | Facility: HOSPITAL | Age: 53
Discharge: HOME | End: 2023-03-20
Attending: INTERNAL MEDICINE
Payer: MEDICARE

## 2023-03-20 DIAGNOSIS — L97.426 DIABETIC ULCER OF LEFT MIDFOOT ASSOCIATED WITH TYPE 2 DIABETES MELLITUS, WITH BONE INVOLVEMENT WITHOUT EVIDENCE OF NECROSIS: ICD-10-CM

## 2023-03-20 DIAGNOSIS — E11.621 DIABETIC ULCER OF RIGHT FOOT ASSOCIATED WITH TYPE 2 DIABETES MELLITUS, WITH FAT LAYER EXPOSED: ICD-10-CM

## 2023-03-20 DIAGNOSIS — L97.512 DIABETIC ULCER OF RIGHT FOOT ASSOCIATED WITH TYPE 2 DIABETES MELLITUS, WITH FAT LAYER EXPOSED: ICD-10-CM

## 2023-03-20 DIAGNOSIS — M86.10 ACUTE ON CHRONIC OSTEOMYELITIS: ICD-10-CM

## 2023-03-20 DIAGNOSIS — E11.621 DIABETIC ULCER OF LEFT MIDFOOT ASSOCIATED WITH TYPE 2 DIABETES MELLITUS, WITH BONE INVOLVEMENT WITHOUT EVIDENCE OF NECROSIS: ICD-10-CM

## 2023-03-20 DIAGNOSIS — M86.60 ACUTE ON CHRONIC OSTEOMYELITIS: ICD-10-CM

## 2023-03-20 DIAGNOSIS — M86.671 CHRONIC OSTEOMYELITIS OF RIGHT FOOT: Primary | ICD-10-CM

## 2023-03-20 LAB
POCT GLUCOSE: 266 MG/DL (ref 70–110)
POCT GLUCOSE: 278 MG/DL (ref 70–110)

## 2023-03-20 PROCEDURE — 82962 GLUCOSE BLOOD TEST: CPT | Performed by: INTERNAL MEDICINE

## 2023-03-20 PROCEDURE — G0277 HBOT, FULL BODY CHAMBER, 30M: HCPCS | Performed by: INTERNAL MEDICINE

## 2023-03-20 PROCEDURE — 99183 PR HYPERBARIC OXYGEN THERAPY ATTENDANCE/SUPERVISION, PER SESSION: ICD-10-PCS | Mod: ,,, | Performed by: INTERNAL MEDICINE

## 2023-03-20 PROCEDURE — 99183 HYPERBARIC OXYGEN THERAPY: CPT | Mod: ,,, | Performed by: INTERNAL MEDICINE

## 2023-03-20 NOTE — PROGRESS NOTES
03/20/23 1000   Hyperbaric Pre-Inspection   Mattress cleaned prior to treatment Yes   2 Patient Identifiers Verified Yes   Consent Obtained Yes   Cotton Gown Yes   Patient voided Yes   Drainage Bags Emptied Not applicable   Patient Pregnant Not applicable   Glasses, Jewelry, Makeup, etc. Removed Yes   Dentures, Hearing Aid, Prosthetic Devices Removed Yes   Touch hair to check for hair spray Yes   Cold/Flu Symptoms No   Diabetic Patient Eaten Not applicable   Medications Given Not applicable   Fears and apprehensions verbalized Yes   Ground Wire Secured Yes   HBO Treatment Course Details   Treatment Course Number 1   Ordering Provider Waylon SCHILLING   Indications Diabetic wounds of lower extremities   HBO Treatment Start Date 12/19/22   HBO Treatment Details   Treatment Number 52   Inpatient Visit No   Total Treatment Length Calc (minutes) 106   Chamber Type Monoplace   Chamber # 1   HBO Dive Log # [id number]   Treatment Protocol 2.0 SURI x 90 minutes w/100% oxygen and no air break   Treatment Details   Dive Rate Down 1.5 psi/minute   Dive Rate Up 2.0 psi/minute   Compress Begins 1 SURI   Clock Time 1023   Tx Pressure Reached 2 SURI   Clock Time 1031   Decompress Begins 2 SURI   Clock Time 1210   Decompress Ends 1 SURI   Clock Time 1209   Pre HBO Vital Signs   BP (!) 162/79   Pulse 85   Resp 16   Temp 97.7 °F (36.5 °C)   Blood Glucose 266   Glucose Meter # wc   Pain Level 0   Post HBO Vital Signs   BP (!) 179/90   Pulse 82   Resp 16   Blood Glucose 278   Glucose Meter # wc   Pain Level 0           Arrived awake and alert. ABC's intact. Ambulated without assistance. Cleared for treatment by Waylon SCHILLING Tolerated treatment without complaints. Cleared from chamber without incidents. Follow up tomorrow for HBO.         ICD-10-CM ICD-9-CM   1. Chronic osteomyelitis of right foot  M86.671 730.17   2. Acute on chronic osteomyelitis  M86.10 730.00    M86.60 730.10   3. Diabetic ulcer of right foot associated with type 2 diabetes  mellitus, with fat layer exposed  E11.621 250.80    L97.512 707.15   4. Diabetic ulcer of left midfoot associated with type 2 diabetes mellitus, with bone involvement without evidence of necrosis  E11.621 250.80    L97.426 707.14          Physician Supervision  I provided direct supervision and was immediately available to furnish assistance and direction throughout the performance of the procedure.    Emergency Response Team  A trained emergency response team and emergency services were available throughout procedure.

## 2023-03-21 ENCOUNTER — HOSPITAL ENCOUNTER (OUTPATIENT)
Dept: WOUND CARE | Facility: HOSPITAL | Age: 53
Discharge: HOME | End: 2023-03-21
Attending: FAMILY MEDICINE
Payer: MEDICARE

## 2023-03-21 VITALS — TEMPERATURE: 98 F | DIASTOLIC BLOOD PRESSURE: 75 MMHG | SYSTOLIC BLOOD PRESSURE: 154 MMHG | HEART RATE: 89 BPM

## 2023-03-21 DIAGNOSIS — M86.60 ACUTE ON CHRONIC OSTEOMYELITIS: ICD-10-CM

## 2023-03-21 DIAGNOSIS — M86.10 ACUTE ON CHRONIC OSTEOMYELITIS: ICD-10-CM

## 2023-03-21 DIAGNOSIS — E11.621 DIABETIC ULCER OF RIGHT MIDFOOT ASSOCIATED WITH TYPE 2 DIABETES MELLITUS, WITH NECROSIS OF BONE: ICD-10-CM

## 2023-03-21 DIAGNOSIS — M86.671 CHRONIC OSTEOMYELITIS OF RIGHT FOOT: Primary | ICD-10-CM

## 2023-03-21 DIAGNOSIS — L97.426 DIABETIC ULCER OF LEFT MIDFOOT ASSOCIATED WITH TYPE 2 DIABETES MELLITUS, WITH BONE INVOLVEMENT WITHOUT EVIDENCE OF NECROSIS: ICD-10-CM

## 2023-03-21 DIAGNOSIS — E11.621 DIABETIC ULCER OF LEFT MIDFOOT ASSOCIATED WITH TYPE 2 DIABETES MELLITUS, WITH BONE INVOLVEMENT WITHOUT EVIDENCE OF NECROSIS: ICD-10-CM

## 2023-03-21 DIAGNOSIS — L97.414 DIABETIC ULCER OF RIGHT MIDFOOT ASSOCIATED WITH TYPE 2 DIABETES MELLITUS, WITH NECROSIS OF BONE: ICD-10-CM

## 2023-03-21 DIAGNOSIS — E11.621 DIABETIC ULCER OF LEFT FOOT: Primary | ICD-10-CM

## 2023-03-21 DIAGNOSIS — L97.529 DIABETIC ULCER OF LEFT FOOT: Primary | ICD-10-CM

## 2023-03-21 LAB
POCT GLUCOSE: 247 MG/DL (ref 70–110)
POCT GLUCOSE: 276 MG/DL (ref 70–110)

## 2023-03-21 PROCEDURE — G0277 HBOT, FULL BODY CHAMBER, 30M: HCPCS | Performed by: FAMILY MEDICINE

## 2023-03-21 PROCEDURE — 82962 GLUCOSE BLOOD TEST: CPT | Mod: 59 | Performed by: PHYSICAL THERAPIST

## 2023-03-21 PROCEDURE — 99213 OFFICE O/P EST LOW 20 MIN: CPT | Mod: 25

## 2023-03-21 PROCEDURE — 99183 HYPERBARIC OXYGEN THERAPY: CPT | Mod: ,,, | Performed by: FAMILY MEDICINE

## 2023-03-21 PROCEDURE — 99183 PR HYPERBARIC OXYGEN THERAPY ATTENDANCE/SUPERVISION, PER SESSION: ICD-10-PCS | Mod: ,,, | Performed by: FAMILY MEDICINE

## 2023-03-21 NOTE — PROGRESS NOTES
03/21/23 0930   Hyperbaric Pre-Inspection   Mattress cleaned prior to treatment Yes   2 Patient Identifiers Verified Yes   Consent Obtained Yes   Cotton Gown Yes   Patient voided Yes   Drainage Bags Emptied Not applicable   Patient Pregnant Not applicable   Glasses, Jewelry, Makeup, etc. Removed Yes   Dentures, Hearing Aid, Prosthetic Devices Removed Yes   Touch hair to check for hair spray Yes   Cold/Flu Symptoms No   Diabetic Patient Eaten Yes   Medications Given Not applicable   Fears and apprehensions verbalized Yes   Ground Wire Secured Yes   HBO Treatment Course Details   Treatment Course Number 1   Indications Diabetic wounds of lower extremities   HBO Treatment Start Date 12/29/22   HBO Treatment Details   Treatment Number 53   Inpatient Visit No   Total Treatment Length Calc (minutes) 108   Chamber Type Monoplace   Chamber # 1   HBO Dive Log # [id number]   Treatment Protocol 2.0 SURI x 90 minutes w/100% oxygen and no air break   Treatment Details   Dive Rate Down 1.5 psi/minute   Dive Rate Up 2.0 psi/minute   Compress Begins 1 SURI   Clock Time 0945   Tx Pressure Reached 2 SURI   Clock Time 0955   Decompress Begins 2 SURI   Clock Time 1125   Decompress Ends 1 SURI   Clock Time 1133   Pre HBO Vital Signs   BP (!) 154/75   Pulse 89   Resp 16   Temp 98.1 °F (36.7 °C)   Blood Glucose 276   Glucose Meter # wc   Pain Level 0   Post HBO Vital Signs   BP (!) 186/91   Pulse 77   Resp 16   Blood Glucose 247   Glucose Meter # wc   Pain Level 0         Arrived awake and alert. ABC's intact. Ambulated without assistance. Cleared for treatment by Roberto SCHILLING Tolerated treatment without complaints. Cleared from chamber without incidents. Follow up tomorrow for HBO.         ICD-10-CM ICD-9-CM   1. Chronic osteomyelitis of right foot  M86.671 730.17   2. Acute on chronic osteomyelitis  M86.10 730.00    M86.60 730.10   3. Diabetic ulcer of left midfoot associated with type 2 diabetes mellitus, with bone involvement without evidence  of necrosis  E11.621 250.80    L97.426 707.14   4. Diabetic ulcer of right midfoot associated with type 2 diabetes mellitus, with necrosis of bone  E11.621 250.80    L97.414 707.14     730.17        Physician Supervision  I provided direct supervision and was immediately available to furnish assistance and direction throughout the performance of the procedure.    Emergency Response Team  A trained emergency response team and emergency services were available throughout procedure.

## 2023-03-22 ENCOUNTER — HOSPITAL ENCOUNTER (OUTPATIENT)
Dept: WOUND CARE | Facility: HOSPITAL | Age: 53
Discharge: HOME | End: 2023-03-22
Attending: FAMILY MEDICINE
Payer: MEDICARE

## 2023-03-22 DIAGNOSIS — L97.426 DIABETIC ULCER OF LEFT MIDFOOT ASSOCIATED WITH TYPE 2 DIABETES MELLITUS, WITH BONE INVOLVEMENT WITHOUT EVIDENCE OF NECROSIS: ICD-10-CM

## 2023-03-22 DIAGNOSIS — M86.671 CHRONIC OSTEOMYELITIS OF RIGHT FOOT: Primary | ICD-10-CM

## 2023-03-22 DIAGNOSIS — M86.60 ACUTE ON CHRONIC OSTEOMYELITIS: ICD-10-CM

## 2023-03-22 DIAGNOSIS — L97.414 DIABETIC ULCER OF RIGHT MIDFOOT ASSOCIATED WITH TYPE 2 DIABETES MELLITUS, WITH NECROSIS OF BONE: ICD-10-CM

## 2023-03-22 DIAGNOSIS — E11.621 DIABETIC ULCER OF RIGHT MIDFOOT ASSOCIATED WITH TYPE 2 DIABETES MELLITUS, WITH NECROSIS OF BONE: ICD-10-CM

## 2023-03-22 DIAGNOSIS — M86.10 ACUTE ON CHRONIC OSTEOMYELITIS: ICD-10-CM

## 2023-03-22 DIAGNOSIS — E11.621 DIABETIC ULCER OF LEFT MIDFOOT ASSOCIATED WITH TYPE 2 DIABETES MELLITUS, WITH BONE INVOLVEMENT WITHOUT EVIDENCE OF NECROSIS: ICD-10-CM

## 2023-03-22 LAB
POCT GLUCOSE: 242 MG/DL (ref 70–110)
POCT GLUCOSE: 250 MG/DL (ref 70–110)

## 2023-03-22 PROCEDURE — 99183 HYPERBARIC OXYGEN THERAPY: CPT | Mod: ,,, | Performed by: FAMILY MEDICINE

## 2023-03-22 PROCEDURE — 82962 GLUCOSE BLOOD TEST: CPT

## 2023-03-22 PROCEDURE — 99183 PR HYPERBARIC OXYGEN THERAPY ATTENDANCE/SUPERVISION, PER SESSION: ICD-10-PCS | Mod: ,,, | Performed by: FAMILY MEDICINE

## 2023-03-22 PROCEDURE — G0277 HBOT, FULL BODY CHAMBER, 30M: HCPCS | Performed by: FAMILY MEDICINE

## 2023-03-22 NOTE — PROGRESS NOTES
03/22/23 1000   Hyperbaric Pre-Inspection   Mattress cleaned prior to treatment Yes   2 Patient Identifiers Verified Yes   Consent Obtained Yes   Cotton Gown Yes   Patient voided Yes   Drainage Bags Emptied Not applicable   Patient Pregnant No   Glasses, Jewelry, Makeup, etc. Removed Yes   Dentures, Hearing Aid, Prosthetic Devices Removed Yes   Touch hair to check for hair spray Yes   Cold/Flu Symptoms No   Diabetic Patient Eaten Yes   Medications Given Not applicable   Fears and apprehensions verbalized Yes   Ground Wire Secured Yes   HBO Treatment Course Details   Treatment Course Number 1   Ordering Provider Waylon SCHILLING   Indications Diabetic wounds of lower extremities   HBO Treatment Start Date 12/29/22   HBO Treatment Details   Treatment Number 54   Inpatient Visit No   Total Treatment Length Calc (minutes) 108   Chamber Type Monoplace   Chamber # 1   HBO Dive Log # [id number]   Treatment Protocol 2.0 SURI x 90 minutes w/100% oxygen and no air break   Treatment Details   Dive Rate Down 1.5 psi/minute   Dive Rate Up 2.0 psi/minute   Compress Begins 1 SURI   Clock Time 1022   Tx Pressure Reached 2 SURI   Clock Time 1032   Decompress Begins 2 SURI   Clock Time 1202   Decompress Ends 1 SURI   Clock Time 1210   Pre HBO Vital Signs   BP (!) 158/83   Pulse 91   Resp 18   Temp 97.7 °F (36.5 °C)   Blood Glucose 242   Glucose Meter # wc   Pain Level 0   Post HBO Vital Signs   BP (!) 175/86   Pulse 86   Resp 16   Blood Glucose 250   Glucose Meter # wc   Pain Level 0           Arrived awake and alert. ABC's intact. Ambulated without assistance. Cleared for treatment by Jennifer SCHILLING Tolerated treatment without complaints. Cleared from chamber without incidents. Follow up tomorrow for HBO.         ICD-10-CM ICD-9-CM   1. Chronic osteomyelitis of right foot  M86.671 730.17   2. Acute on chronic osteomyelitis  M86.10 730.00    M86.60 730.10   3. Diabetic ulcer of left midfoot associated with type 2 diabetes mellitus, with bone  involvement without evidence of necrosis  E11.621 250.80    L97.426 707.14   4. Diabetic ulcer of right midfoot associated with type 2 diabetes mellitus, with necrosis of bone  E11.621 250.80    L97.414 707.14     730.17        Physician Supervision  I provided direct supervision and was immediately available to furnish assistance and direction throughout the performance of the procedure.    Emergency Response Team  A trained emergency response team and emergency services were available throughout procedure.

## 2023-03-23 ENCOUNTER — HOSPITAL ENCOUNTER (OUTPATIENT)
Dept: WOUND CARE | Facility: HOSPITAL | Age: 53
Discharge: HOME | End: 2023-03-23
Attending: FAMILY MEDICINE
Payer: MEDICARE

## 2023-03-23 ENCOUNTER — OFFICE VISIT (OUTPATIENT)
Dept: INFECTIOUS DISEASES | Facility: CLINIC | Age: 53
End: 2023-03-23
Payer: MEDICARE

## 2023-03-23 VITALS
DIASTOLIC BLOOD PRESSURE: 91 MMHG | TEMPERATURE: 99 F | HEART RATE: 90 BPM | HEIGHT: 73 IN | WEIGHT: 238.31 LBS | BODY MASS INDEX: 31.59 KG/M2 | SYSTOLIC BLOOD PRESSURE: 150 MMHG

## 2023-03-23 DIAGNOSIS — M86.671 CHRONIC OSTEOMYELITIS OF RIGHT FOOT: Primary | ICD-10-CM

## 2023-03-23 DIAGNOSIS — L97.426 DIABETIC ULCER OF LEFT MIDFOOT ASSOCIATED WITH TYPE 2 DIABETES MELLITUS, WITH BONE INVOLVEMENT WITHOUT EVIDENCE OF NECROSIS: ICD-10-CM

## 2023-03-23 DIAGNOSIS — M86.272 SUBACUTE OSTEOMYELITIS OF LEFT FOOT: Primary | ICD-10-CM

## 2023-03-23 DIAGNOSIS — M86.60 ACUTE ON CHRONIC OSTEOMYELITIS: ICD-10-CM

## 2023-03-23 DIAGNOSIS — L97.512 DIABETIC ULCER OF RIGHT FOOT ASSOCIATED WITH TYPE 2 DIABETES MELLITUS, WITH FAT LAYER EXPOSED: ICD-10-CM

## 2023-03-23 DIAGNOSIS — E11.621 DIABETIC ULCER OF LEFT MIDFOOT ASSOCIATED WITH TYPE 2 DIABETES MELLITUS, WITH BONE INVOLVEMENT WITHOUT EVIDENCE OF NECROSIS: ICD-10-CM

## 2023-03-23 DIAGNOSIS — E11.621 DIABETIC ULCER OF RIGHT FOOT ASSOCIATED WITH TYPE 2 DIABETES MELLITUS, WITH FAT LAYER EXPOSED: ICD-10-CM

## 2023-03-23 DIAGNOSIS — M86.10 ACUTE ON CHRONIC OSTEOMYELITIS: ICD-10-CM

## 2023-03-23 DIAGNOSIS — E11.621 DIABETIC ULCER OF RIGHT MIDFOOT ASSOCIATED WITH TYPE 2 DIABETES MELLITUS, WITH NECROSIS OF BONE: ICD-10-CM

## 2023-03-23 DIAGNOSIS — L97.414 DIABETIC ULCER OF RIGHT MIDFOOT ASSOCIATED WITH TYPE 2 DIABETES MELLITUS, WITH NECROSIS OF BONE: ICD-10-CM

## 2023-03-23 LAB
POCT GLUCOSE: 193 MG/DL (ref 70–110)
POCT GLUCOSE: 200 MG/DL (ref 70–110)

## 2023-03-23 PROCEDURE — 82962 GLUCOSE BLOOD TEST: CPT | Mod: 59 | Performed by: FAMILY MEDICINE

## 2023-03-23 PROCEDURE — 99213 OFFICE O/P EST LOW 20 MIN: CPT | Mod: S$PBB,,, | Performed by: INTERNAL MEDICINE

## 2023-03-23 PROCEDURE — 99183 HYPERBARIC OXYGEN THERAPY: CPT | Mod: ,,, | Performed by: FAMILY MEDICINE

## 2023-03-23 PROCEDURE — 99999 PR PBB SHADOW E&M-EST. PATIENT-LVL III: CPT | Mod: PBBFAC,,, | Performed by: INTERNAL MEDICINE

## 2023-03-23 PROCEDURE — 99999 PR PBB SHADOW E&M-EST. PATIENT-LVL III: ICD-10-PCS | Mod: PBBFAC,,, | Performed by: INTERNAL MEDICINE

## 2023-03-23 PROCEDURE — 99183 PR HYPERBARIC OXYGEN THERAPY ATTENDANCE/SUPERVISION, PER SESSION: ICD-10-PCS | Mod: ,,, | Performed by: FAMILY MEDICINE

## 2023-03-23 PROCEDURE — 99213 OFFICE O/P EST LOW 20 MIN: CPT | Mod: PBBFAC,25 | Performed by: INTERNAL MEDICINE

## 2023-03-23 PROCEDURE — G0277 HBOT, FULL BODY CHAMBER, 30M: HCPCS | Performed by: FAMILY MEDICINE

## 2023-03-23 PROCEDURE — 99213 PR OFFICE/OUTPT VISIT, EST, LEVL III, 20-29 MIN: ICD-10-PCS | Mod: S$PBB,,, | Performed by: INTERNAL MEDICINE

## 2023-03-23 NOTE — PROGRESS NOTES
03/23/23 1000   Hyperbaric Pre-Inspection   Mattress cleaned prior to treatment Yes   2 Patient Identifiers Verified Yes   Consent Obtained Yes   Cotton Gown Yes   Patient voided Yes   Drainage Bags Emptied Not applicable   Patient Pregnant Not applicable   Glasses, Jewelry, Makeup, etc. Removed Yes   Dentures, Hearing Aid, Prosthetic Devices Removed Yes   Touch hair to check for hair spray Yes   Cold/Flu Symptoms No   Diabetic Patient Eaten Yes   Medications Given Not applicable   Fears and apprehensions verbalized Yes   Ground Wire Secured Yes   HBO Treatment Course Details   Treatment Course Number 1   Ordering Provider Waylon SCHILLING   Indications Diabetic wounds of lower extremities   HBO Treatment Start Date 12/19/22   HBO Treatment Details   Treatment Number 53   Inpatient Visit No   Total Treatment Length Calc (minutes) 108   Chamber Type Monoplace   Chamber # 2   HBO Dive Log # [id number]   Treatment Protocol 2.0 SURI x 90 minutes w/100% oxygen and no air break   Treatment Details   Dive Rate Down 1.5 psi/minute   Dive Rate Up 2.0 psi/minute   Compress Begins 1 SURI   Clock Time 1019   Tx Pressure Reached 2 SURI   Clock Time 1029   Decompress Begins 2 SURI   Clock Time 1159   Decompress Ends 1 SURI   Clock Time 1207   Pre HBO Vital Signs   BP (!) 146/72   Pulse 94   Resp 16   Temp 98.1 °F (36.7 °C)   Blood Glucose 193   Glucose Meter # wc   Pain Level 0   Post HBO Vital Signs   BP (!) 178/95   Pulse 85   Resp 16   Blood Glucose 200   Glucose Meter # wc   Pain Level 0         Arrived awake and alert. ABC's intact. Ambulated without assistance. Cleared for treatment by Kira SCHILLING Tolerated treatment without complaints. Cleared from chamber without incidents. Follow up tomorrow for HBO.         ICD-10-CM ICD-9-CM   1. Chronic osteomyelitis of right foot  M86.671 730.17   2. Diabetic ulcer of right foot associated with type 2 diabetes mellitus, with fat layer exposed  E11.621 250.80    L97.512 707.15   3. Acute on  chronic osteomyelitis  M86.10 730.00    M86.60 730.10   4. Diabetic ulcer of left midfoot associated with type 2 diabetes mellitus, with bone involvement without evidence of necrosis  E11.621 250.80    L97.426 707.14   5. 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    Emergency Response Team  A trained emergency response team and emergency services were available throughout procedure.

## 2023-03-23 NOTE — PROGRESS NOTES
Infectious Disease Clinic Note  03/23/2023       Subjective:       Patient ID: Indio Ryder Jr. is a 52 y.o. male being seen for an new visit.    Chief Complaint: ulcer on foot    HPI     Mr. Ryder is a 51 y/o M with hx DM and numerous episodes of foot osteo presents for f/up of Lt foot osteomyelitis.    Interval hx:  Last seen on 3/2/23. Completed 6 wks cipro (3/12) and 4 wks augmentin (3/8) for pseudomonas (bone and wound cx) and e faecalis (wound cx). Reports wound is healing well. Continues to f/u closely w podiatry and hyperbarics. Denies purulent drainage, foot pain, fevers, chills or diarrhea.     Hx:   Pt has been following with podiatry for a non healing Lt foot ulcer that per pt, suddenly worsened. MRI  1/19 revealed: Extensive marrow edema involving the 4th TMT joint extending to the 2nd and 3rd TMT joints contiguous with the overlying soft tissue ulceration, concerning for osteomyelitis.  Underwent bone biopsy of left trochar on 1/20. Bone culture from 1/20 grew pseudomonas. Bone path revealed OM.  Discussed results with patient and started on cipro x 6 wks on 1/29. Subsequent wound culture from 1/27 growing pseudomonas plus e faecalis (R to tetracycline/ S to amp).       Has been undergoing hyperbaric therapy since 12/2022. Had normal ALISSA 11/18/22. Was treated x 4 wks w augmenting for e faecalis positive wound cx and 6 wks with cipro  (last day 3/12) for pseudomonas OM (bone and wound cx +). Underwent graft placement on 2/24 by Dr. Peterson.        Family History   Adopted: Yes   Problem Relation Age of Onset    Hypertension Mother     Cancer Mother         breast    Diabetes Mother     Hypertension Father     Cataracts Father     Heart disease Father         mi    Diabetes Sister     No Known Problems Brother     Heart disease Sister         MI    No Known Problems Sister     Hypertension Maternal Aunt     No Known Problems Maternal Uncle     No Known Problems Paternal Aunt     No Known Problems  Paternal Uncle     No Known Problems Maternal Grandmother     No Known Problems Maternal Grandfather     No Known Problems Paternal Grandmother     No Known Problems Paternal Grandfather     Amblyopia Neg Hx     Blindness Neg Hx     Glaucoma Neg Hx     Macular degeneration Neg Hx     Retinal detachment Neg Hx     Strabismus Neg Hx     Stroke Neg Hx     Thyroid disease Neg Hx      Social History     Socioeconomic History    Marital status: Single    Number of children: 0   Occupational History    Occupation: Retired     Employer: [employer]   Tobacco Use    Smoking status: Never    Smokeless tobacco: Never   Substance and Sexual Activity    Alcohol use: No    Drug use: No    Sexual activity: Yes     Partners: Female     Birth control/protection: Condom     Past Surgical History:   Procedure Laterality Date    COLONOSCOPY Right 1/19/2022    Procedure: COLONOSCOPY;  Surgeon: Tj Haile MD;  Location: [facility] ENDO (4TH FLR);  Service: Endoscopy;  Laterality: Right;  previous order un-usable/poor prep 1/18/22  RAPID COVID test arrival 12:20  instructions handed to pt- sm    COLONOSCOPY N/A 3/21/2022    Procedure: COLONOSCOPY;  Surgeon: Sheng Haque MD;  Location: [facility] ENDO (2ND FLR);  Service: Endoscopy;  Laterality: N/A;  Colonoscopy with AES for hepatix flexure polyp removal, 2nd floor  Pt is fully vaccinated-DS  Pt prescribed Plavix by Dr. Stahl in Jan. 2022, however pt states that he never started taking it-DS  3/16/22-Instructions sent via portal-DS    DEBRIDEMENT OF FOOT Left 7/14/2019    Procedure: DEBRIDEMENT, FOOT, with left 5th ray amputation;  Surgeon: Sis Hickman DPM;  Location: [facility] OR 2ND FLR;  Service: Podiatry;  Laterality: Left;    DEBRIDEMENT OF FOOT Left 7/17/2019    Procedure: DEBRIDEMENT, FOOT with leftt 5th ray partial amputation with Neox Graft;  Surgeon: Mai Burrell DPM;  Location: [facility] OR 2ND FLR;  Service: Podiatry;  Laterality: Left;  t    DEBRIDEMENT OF FOOT Bilateral  4/29/2021    Procedure: DEBRIDEMENT, FOOT;  Surgeon: Mai Burrell DPM;  Location: [facility] OR 1ST FLR;  Service: Podiatry;  Laterality: Bilateral;  Graft application    TOE AMPUTATION Right 06/05/2015    TOE AMPUTATION Right 01/19/2017    XI ROBOTIC COLECTOMY, RIGHT N/A 4/25/2022    Procedure: XI ROBOTIC COLECTOMY, RIGHT (lithotomy, eras low);  Surgeon: Erik Stout MD;  Location: [facility] OR 2ND FLR;  Service: Colon and Rectal;  Laterality: N/A;  Paruch to Goodwin    XI ROBOTIC COLECTOMY, RIGHT  4/25/2022    Procedure: ;  Surgeon: Erik Stout MD;  Location: [facility] OR 2ND FLR;  Service: Colon and Rectal;;       Patient's Medications   New Prescriptions    No medications on file   Previous Medications    ACETAMINOPHEN (TYLENOL) 325 MG TABLET    Take 2 tablets (650 mg total) by mouth every 6 (six) hours as needed.    ATORVASTATIN (LIPITOR) 80 MG TABLET    Take 1 tablet (80 mg total) by mouth once daily.    DIPHENOXYLATE-ATROPINE 2.5-0.025 MG (LOMOTIL) 2.5-0.025 MG PER TABLET    Take 1 to 2 tablets by mouth three times daily as needed for diarrhea    EMPAGLIFLOZIN (JARDIANCE) 10 MG TABLET    Take 1 tablet (10 mg total) by mouth once daily.    FLUTICASONE PROPIONATE (FLONASE) 50 MCG/ACTUATION NASAL SPRAY    Use 2 sprays (100 mcg total) in each nostril once daily.    INSULIN ASPART U-100 (NOVOLOG) 100 UNIT/ML (3 ML) INPN PEN    Inject 8 units before meals plus scale 150-200+2, 201-250+4, 251-300+6, 301-350+8, >350+10. Max daily 54 units.    INSULIN DETEMIR U-100 (LEVEMIR FLEXTOUCH) 100 UNIT/ML (3 ML) SUBQ INPN PEN    Inject 26 Units into the skin every evening.    ONDANSETRON (ZOFRAN-ODT) 8 MG TBDL    Dissolve 1 tablet (8 mg total) by mouth every 8 (eight) hours as needed (Nausea).    ONETOUCH DELICA LANCETS 33 GAUGE [facility]        ONETOUCH ULTRA2 KIT        PANTOPRAZOLE (PROTONIX) 40 MG TABLET    Take 1 tablet (40 mg total) by mouth once daily.    PEN NEEDLE, DIABETIC (BD SASCHA 2ND GEN PEN NEEDLE) 32 GAUGE X  "5/32" NDLE    Use 4 times a day    SEMAGLUTIDE (OZEMPIC) 1 MG/DOSE (4 MG/3 ML)    Inject 1 mg into the skin every 7 days.    TAMSULOSIN (FLOMAX) 0.4 MG CAP    Take 2 capsules (0.8 mg total) by mouth once daily. 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, with long-term current use of insulin 03/23/2021    Chronic left shoulder pain 07/30/2020    Allergic reaction due to antibacterial drug 04/05/2020    Subacute osteomyelitis of left foot 03/11/2020    Hypertensive retinopathy, bilateral 01/28/2020    Diabetic ulcer of left midfoot associated with type 2 diabetes mellitus, with bone involvement without evidence of necrosis 08/02/2019    Severe malnutrition 07/22/2019    Diabetic ulcer of right midfoot associated with type 2 diabetes mellitus, with necrosis of bone 07/12/2019    Neck pain 11/08/2017    Hypokalemia 05/30/2017    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   Respiratory:  Negative for cough and shortness of breath.    Cardiovascular:  Negative for chest pain and orthopnea.   Gastrointestinal:  " "Negative for abdominal pain, diarrhea and vomiting.   Genitourinary:  Negative for dysuria.   Musculoskeletal:  Negative for back pain and myalgias.        B/l foot wounds   Neurological:  Positive for sensory change. Negative for weakness.         Objective:      BP (!) 150/91 (BP Location: Left arm, Patient Position: Sitting)   Pulse 90   Temp 98.5 °F (36.9 °C) (Oral)   Ht 6' 1" (1.854 m)   Wt 108.1 kg (238 lb 5.1 oz)   BMI 31.44 kg/m²   Estimated body mass index is 31.44 kg/m² as calculated from the following:    Height as of this encounter: 6' 1" (1.854 m).    Weight as of this encounter: 108.1 kg (238 lb 5.1 oz).    Physical Exam  Vitals and nursing note reviewed.   Constitutional:       Appearance: Normal appearance. He is not ill-appearing.   HENT:      Head: Normocephalic and atraumatic.      Nose: Nose normal. No congestion.      Mouth/Throat:      Mouth: Mucous membranes are moist.      Pharynx: Oropharynx is clear.   Eyes:      Conjunctiva/sclera: Conjunctivae normal.      Pupils: Pupils are equal, round, and reactive to light.   Cardiovascular:      Rate and Rhythm: Normal rate and regular rhythm.   Pulmonary:      Effort: Pulmonary effort is normal. No respiratory distress.      Breath sounds: Normal breath sounds.   Abdominal:      General: Abdomen is flat. There is no distension.      Palpations: Abdomen is soft.   Musculoskeletal:         General: No swelling. Normal range of motion.      Cervical back: Normal range of motion and neck supple.      Comments: Clean dressings in b/l feet  Images reviewed on media tab. He is since s/p graft placement.   Skin:     General: Skin is warm and dry.   Neurological:      General: No focal deficit present.      Mental Status: He is alert and oriented to person, place, and time.   Psychiatric:         Mood and Affect: Mood normal.         Behavior: Behavior normal.       Assessment:         1. Subacute osteomyelitis of left foot                    Plan:     "   Osteomyelitis Left foot  Worsening Lt plantar ulcer with deep tunneling concerning for OM. MRI revealed Extensive marrow edema involving the 4th TMT joint extending to the 2nd and 3rd TMT joints contiguous with the overlying soft tissue ulceration, concerning for osteomyelitis. S/p bone biopsy of left trochar on 1/20.  Bone path positive for chronic OM and bone cx grew pseudomonas. Culture from wound drainage 1/27 grew psa and e faecalis. S/p 6 wks cipro (last day 3/12) and 4 wks augmentin (3/8).    Plan:  --no indication for additional abx at this time  --continue to f/u closely with podiatry      RTC PRN    Maria Dolores Tan MD  Infectious Disease     Total professional time spent for the encounter: 20 minutes  Time was spent preparing to see the patient, reviewing results of prior testing, obtaining and/or reviewing separately obtained history, performing a medically appropriate examination and interview, counseling and educating the patient/family, ordering medications/tests/procedures, referring and communicating with other health care professionals, documenting clinical information in the electronic health record, and independently interpreting results.

## 2023-03-24 ENCOUNTER — HOSPITAL ENCOUNTER (OUTPATIENT)
Dept: WOUND CARE | Facility: HOSPITAL | Age: 53
Discharge: HOME | End: 2023-03-24
Attending: FAMILY MEDICINE
Payer: MEDICARE

## 2023-03-24 ENCOUNTER — HOSPITAL ENCOUNTER (OUTPATIENT)
Dept: WOUND CARE | Facility: HOSPITAL | Age: 53
Discharge: HOME | End: 2023-03-24
Attending: PODIATRIST
Payer: MEDICARE

## 2023-03-24 DIAGNOSIS — E11.621 DIABETIC ULCER OF LEFT MIDFOOT ASSOCIATED WITH TYPE 2 DIABETES MELLITUS, WITH BONE INVOLVEMENT WITHOUT EVIDENCE OF NECROSIS: ICD-10-CM

## 2023-03-24 DIAGNOSIS — M86.671 CHRONIC OSTEOMYELITIS OF RIGHT FOOT: Primary | ICD-10-CM

## 2023-03-24 DIAGNOSIS — L97.426 DIABETIC ULCER OF LEFT MIDFOOT ASSOCIATED WITH TYPE 2 DIABETES MELLITUS, WITH BONE INVOLVEMENT WITHOUT EVIDENCE OF NECROSIS: ICD-10-CM

## 2023-03-24 DIAGNOSIS — E11.621 DIABETIC ULCER OF RIGHT MIDFOOT ASSOCIATED WITH TYPE 2 DIABETES MELLITUS, WITH NECROSIS OF BONE: ICD-10-CM

## 2023-03-24 DIAGNOSIS — M86.60 ACUTE ON CHRONIC OSTEOMYELITIS: ICD-10-CM

## 2023-03-24 DIAGNOSIS — L97.414 DIABETIC ULCER OF RIGHT MIDFOOT ASSOCIATED WITH TYPE 2 DIABETES MELLITUS, WITH NECROSIS OF BONE: ICD-10-CM

## 2023-03-24 DIAGNOSIS — E11.621 DIABETIC ULCER OF LEFT MIDFOOT ASSOCIATED WITH TYPE 2 DIABETES MELLITUS, WITH BONE INVOLVEMENT WITHOUT EVIDENCE OF NECROSIS: Primary | ICD-10-CM

## 2023-03-24 DIAGNOSIS — L97.426 DIABETIC ULCER OF LEFT MIDFOOT ASSOCIATED WITH TYPE 2 DIABETES MELLITUS, WITH BONE INVOLVEMENT WITHOUT EVIDENCE OF NECROSIS: Primary | ICD-10-CM

## 2023-03-24 DIAGNOSIS — M86.10 ACUTE ON CHRONIC OSTEOMYELITIS: ICD-10-CM

## 2023-03-24 LAB
POCT GLUCOSE: 197 MG/DL (ref 70–110)
POCT GLUCOSE: 200 MG/DL (ref 70–110)

## 2023-03-24 PROCEDURE — 99213 OFFICE O/P EST LOW 20 MIN: CPT | Mod: ,,, | Performed by: PODIATRIST

## 2023-03-24 PROCEDURE — 99183 HYPERBARIC OXYGEN THERAPY: CPT | Mod: ,,, | Performed by: FAMILY MEDICINE

## 2023-03-24 PROCEDURE — 99213 PR OFFICE/OUTPT VISIT, EST, LEVL III, 20-29 MIN: ICD-10-PCS | Mod: ,,, | Performed by: PODIATRIST

## 2023-03-24 PROCEDURE — 82962 GLUCOSE BLOOD TEST: CPT | Performed by: FAMILY MEDICINE

## 2023-03-24 PROCEDURE — G0277 HBOT, FULL BODY CHAMBER, 30M: HCPCS | Performed by: FAMILY MEDICINE

## 2023-03-24 PROCEDURE — 99183 PR HYPERBARIC OXYGEN THERAPY ATTENDANCE/SUPERVISION, PER SESSION: ICD-10-PCS | Mod: ,,, | Performed by: FAMILY MEDICINE

## 2023-03-24 NOTE — PROGRESS NOTES
03/24/23 1000   Hyperbaric Pre-Inspection   Mattress cleaned prior to treatment Yes   2 Patient Identifiers Verified Yes   Consent Obtained Yes   Cotton Gown Yes   Patient voided Yes   Drainage Bags Emptied Not applicable   Patient Pregnant Not applicable   Glasses, Jewelry, Makeup, etc. Removed Yes   Dentures, Hearing Aid, Prosthetic Devices Removed Yes   Touch hair to check for hair spray Yes   Cold/Flu Symptoms No   Diabetic Patient Eaten Not applicable   Medications Given Not applicable   Fears and apprehensions verbalized Yes   Ground Wire Secured Yes   HBO Treatment Course Details   Treatment Course Number 1   Ordering Provider Waylon SCHILLING   Indications Acute traumatic peripheral ischemia   HBO Treatment Start Date 12/19/22   HBO Treatment Details   Treatment Number 55   Inpatient Visit No   Total Treatment Length Calc (minutes) 90   Chamber Type Monoplace   Chamber # 1   HBO Dive Log # [id number]   Treatment Protocol 2.0 SURI x 90 minutes w/100% oxygen and no air break   Treatment Details   Dive Rate Down 1.5 psi/minute   Dive Rate Up 2.0 psi/minute   Compress Begins 1 SURI   Clock Time 1025   Tx Pressure Reached 2 SURI   Clock Time 1035   Decompress Begins 2 SURI   Clock Time 1147   Decompress Ends 1 SURI   Clock Time 1155   Pre HBO Vital Signs   BP (!) 162/98   Pulse 95   Resp 18   Temp 98.2 °F (36.8 °C)   Blood Glucose 200   Glucose Meter # wc   Pain Level 0   Post HBO Vital Signs   BP (!) 173/91   Pulse 87   Resp 16   Blood Glucose 197   Glucose Meter # wc   Pain Level 0       Arrived awake and alert. ABC's intact. Ambulated without assistance. Cleared for treatment by Roberto SCHILLING Tolerated treatment without complaints. Cleared from chamber without incidents. Follow up Monday for HBO.           ICD-10-CM ICD-9-CM   1. Chronic osteomyelitis of right foot  M86.671 730.17   2. Acute on chronic osteomyelitis  M86.10 730.00    M86.60 730.10   3. Diabetic ulcer of right midfoot associated with type 2 diabetes  mellitus, with necrosis of bone  E11.621 250.80    L97.414 707.14     730.17   4. Diabetic ulcer of left midfoot associated with type 2 diabetes mellitus, with bone involvement without evidence of necrosis  E11.621 250.80    L97.426 707.14          Physician Supervision  I provided direct supervision and was immediately available to furnish assistance and direction throughout the performance of the procedure.    Emergency Response Team  A trained emergency response team and emergency services were available throughout procedure.

## 2023-03-27 ENCOUNTER — HOSPITAL ENCOUNTER (OUTPATIENT)
Dept: WOUND CARE | Facility: HOSPITAL | Age: 53
Discharge: HOME | End: 2023-03-27
Attending: INTERNAL MEDICINE
Payer: MEDICARE

## 2023-03-27 DIAGNOSIS — M86.10 ACUTE ON CHRONIC OSTEOMYELITIS: ICD-10-CM

## 2023-03-27 DIAGNOSIS — M86.60 ACUTE ON CHRONIC OSTEOMYELITIS: ICD-10-CM

## 2023-03-27 DIAGNOSIS — E11.621 DIABETIC ULCER OF RIGHT FOOT ASSOCIATED WITH TYPE 2 DIABETES MELLITUS, WITH FAT LAYER EXPOSED: ICD-10-CM

## 2023-03-27 DIAGNOSIS — L97.426 DIABETIC ULCER OF LEFT MIDFOOT ASSOCIATED WITH TYPE 2 DIABETES MELLITUS, WITH BONE INVOLVEMENT WITHOUT EVIDENCE OF NECROSIS: ICD-10-CM

## 2023-03-27 DIAGNOSIS — E11.621 DIABETIC ULCER OF LEFT MIDFOOT ASSOCIATED WITH TYPE 2 DIABETES MELLITUS, WITH BONE INVOLVEMENT WITHOUT EVIDENCE OF NECROSIS: ICD-10-CM

## 2023-03-27 DIAGNOSIS — L97.414 DIABETIC ULCER OF RIGHT MIDFOOT ASSOCIATED WITH TYPE 2 DIABETES MELLITUS, WITH NECROSIS OF BONE: ICD-10-CM

## 2023-03-27 DIAGNOSIS — M86.671 CHRONIC OSTEOMYELITIS OF RIGHT FOOT: Primary | ICD-10-CM

## 2023-03-27 DIAGNOSIS — E11.621 DIABETIC ULCER OF RIGHT MIDFOOT ASSOCIATED WITH TYPE 2 DIABETES MELLITUS, WITH NECROSIS OF BONE: ICD-10-CM

## 2023-03-27 DIAGNOSIS — L97.512 DIABETIC ULCER OF RIGHT FOOT ASSOCIATED WITH TYPE 2 DIABETES MELLITUS, WITH FAT LAYER EXPOSED: ICD-10-CM

## 2023-03-27 LAB
POCT GLUCOSE: 131 MG/DL (ref 70–110)
POCT GLUCOSE: 165 MG/DL (ref 70–110)

## 2023-03-27 PROCEDURE — 99183 PR HYPERBARIC OXYGEN THERAPY ATTENDANCE/SUPERVISION, PER SESSION: ICD-10-PCS | Mod: ,,, | Performed by: INTERNAL MEDICINE

## 2023-03-27 PROCEDURE — G0277 HBOT, FULL BODY CHAMBER, 30M: HCPCS | Performed by: INTERNAL MEDICINE

## 2023-03-27 PROCEDURE — 82962 GLUCOSE BLOOD TEST: CPT | Mod: 91 | Performed by: INTERNAL MEDICINE

## 2023-03-27 PROCEDURE — 99183 HYPERBARIC OXYGEN THERAPY: CPT | Mod: ,,, | Performed by: INTERNAL MEDICINE

## 2023-03-27 NOTE — PROGRESS NOTES
03/27/23 1000   Hyperbaric Pre-Inspection   Mattress cleaned prior to treatment Yes   2 Patient Identifiers Verified Yes   Consent Obtained Yes   Cotton Gown Yes   Patient voided Yes   Drainage Bags Emptied Not applicable   Patient Pregnant Not applicable   Glasses, Jewelry, Makeup, etc. Removed Yes   Dentures, Hearing Aid, Prosthetic Devices Removed Yes   Touch hair to check for hair spray Yes   Cold/Flu Symptoms No   Diabetic Patient Eaten Yes   Medications Given Not applicable   Fears and apprehensions verbalized Yes   Ground Wire Secured Yes   HBO Treatment Course Details   Treatment Course Number 1   Ordering Provider Waylon SCHILLING   Indications Diabetic wounds of lower extremities   HBO Treatment Start Date 12/29/22   HBO Treatment Details   Treatment Number 55   Inpatient Visit No   Total Treatment Length Calc (minutes) 108   Chamber Type Monoplace   Chamber # 1   HBO Dive Log # [id number]   Treatment Protocol 2.0 SURI x 90 minutes w/100% oxygen and no air break   Treatment Details   Dive Rate Down 1.5 psi/minute   Dive Rate Up 2.0 psi/minute   Compress Begins 1 SURI   Clock Time 1041   Tx Pressure Reached 2 SURI   Clock Time 1051   Decompress Begins 2 SURI   Clock Time 1221   Decompress Ends 1 SURI   Clock Time 1229   Pre HBO Vital Signs   /65   Pulse 95   Resp 16   Temp 97.7 °F (36.5 °C)   Blood Glucose 165   Glucose Meter # wc   Pain Level 0   Post HBO Vital Signs   BP (!) 155/87   Pulse 88   Resp 16   SpO2 99 %   Blood Glucose 131   Glucose Meter # wc   Pain Level 0       Arrived awake and alert. ABC's intact. Ambulated without assistance. Cleared for treatment by Waylon SCHILLING Tolerated treatment without complaints. Cleared from chamber without incidents. Follow up tomorrow for HBO.         ICD-10-CM ICD-9-CM   1. Chronic osteomyelitis of right foot  M86.671 730.17   2. Diabetic ulcer of right foot associated with type 2 diabetes mellitus, with fat layer exposed  E11.621 250.80    L97.512 707.15   3. Acute  on chronic osteomyelitis  M86.10 730.00    M86.60 730.10   4. Diabetic ulcer of left midfoot associated with type 2 diabetes mellitus, with bone involvement without evidence of necrosis  E11.621 250.80    L97.426 707.14   5. 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    Emergency Response Team  A trained emergency response team and emergency services were available throughout procedure.

## 2023-03-28 ENCOUNTER — HOSPITAL ENCOUNTER (OUTPATIENT)
Dept: WOUND CARE | Facility: HOSPITAL | Age: 53
Discharge: HOME | End: 2023-03-28
Attending: FAMILY MEDICINE
Payer: MEDICARE

## 2023-03-28 VITALS — HEART RATE: 84 BPM | SYSTOLIC BLOOD PRESSURE: 171 MMHG | TEMPERATURE: 98 F | DIASTOLIC BLOOD PRESSURE: 88 MMHG

## 2023-03-28 DIAGNOSIS — L97.426 DIABETIC ULCER OF LEFT MIDFOOT ASSOCIATED WITH TYPE 2 DIABETES MELLITUS, WITH BONE INVOLVEMENT WITHOUT EVIDENCE OF NECROSIS: ICD-10-CM

## 2023-03-28 DIAGNOSIS — E11.621 DIABETIC ULCER OF RIGHT MIDFOOT ASSOCIATED WITH TYPE 2 DIABETES MELLITUS, WITH NECROSIS OF BONE: ICD-10-CM

## 2023-03-28 DIAGNOSIS — E11.621 DIABETIC ULCER OF RIGHT FOOT ASSOCIATED WITH TYPE 2 DIABETES MELLITUS, WITH FAT LAYER EXPOSED: ICD-10-CM

## 2023-03-28 DIAGNOSIS — M86.10 ACUTE ON CHRONIC OSTEOMYELITIS: ICD-10-CM

## 2023-03-28 DIAGNOSIS — L97.512 DIABETIC ULCER OF RIGHT FOOT ASSOCIATED WITH TYPE 2 DIABETES MELLITUS, WITH FAT LAYER EXPOSED: ICD-10-CM

## 2023-03-28 DIAGNOSIS — L97.414 DIABETIC ULCER OF RIGHT MIDFOOT ASSOCIATED WITH TYPE 2 DIABETES MELLITUS, WITH NECROSIS OF BONE: ICD-10-CM

## 2023-03-28 DIAGNOSIS — E11.621 DIABETIC ULCER OF LEFT MIDFOOT ASSOCIATED WITH TYPE 2 DIABETES MELLITUS, WITH BONE INVOLVEMENT WITHOUT EVIDENCE OF NECROSIS: ICD-10-CM

## 2023-03-28 DIAGNOSIS — M86.671 CHRONIC OSTEOMYELITIS OF RIGHT FOOT: Primary | ICD-10-CM

## 2023-03-28 DIAGNOSIS — M86.60 ACUTE ON CHRONIC OSTEOMYELITIS: ICD-10-CM

## 2023-03-28 LAB
POCT GLUCOSE: 168 MG/DL (ref 70–110)
POCT GLUCOSE: 176 MG/DL (ref 70–110)

## 2023-03-28 PROCEDURE — 99213 OFFICE O/P EST LOW 20 MIN: CPT | Mod: 25

## 2023-03-28 PROCEDURE — 82962 GLUCOSE BLOOD TEST: CPT | Mod: 91 | Performed by: FAMILY MEDICINE

## 2023-03-28 PROCEDURE — 99183 HYPERBARIC OXYGEN THERAPY: CPT | Mod: ,,, | Performed by: FAMILY MEDICINE

## 2023-03-28 PROCEDURE — 99183 PR HYPERBARIC OXYGEN THERAPY ATTENDANCE/SUPERVISION, PER SESSION: ICD-10-PCS | Mod: ,,, | Performed by: FAMILY MEDICINE

## 2023-03-28 PROCEDURE — G0277 HBOT, FULL BODY CHAMBER, 30M: HCPCS | Performed by: FAMILY MEDICINE

## 2023-03-28 NOTE — PROGRESS NOTES
[facility]  [place]  [id number]  Nurse Visit    Subjective:     Pt arrived to [facility] ambulating without any issues; pt just finished HBO tx for the day. Pt denies any fever, chills, or flu-like symptoms; denies pain/discomfort. Pt reports not having any issues with drsg since last visit. Last orders from 3/17; pt was seen on 3/24, reached out to MD for primary drsg application. Pt will return to [facility] on Friday to see MD.       Assessment:          (Retired) Wound 04/08/22 1137 Diabetic Ulcer Left plantar;lateral Foot (Active)   04/08/22 1137    Pre-existing: Yes   Primary Wound Type: Diabetic ulc   Side: Left   Orientation: plantar;lateral   Location: Foot   Wound Number:    Ankle-Brachial Index:    Pulses:    Removal Indication and Assessment:    Wound Outcome:    (Retired) Wound Type:    (Retired) Wound Length (cm):    (Retired) Wound Width (cm):    (Retired) Depth (cm):    Wound Description (Comments):    Removal Indications:    Wound Image   03/28/23 1143   Wound WDL ex 03/28/23 1143   Dressing Appearance Moist drainage 03/28/23 1143   Drainage Amount Moderate 03/28/23 1143   Drainage Characteristics/Odor Serosanguineous 03/28/23 1143   Appearance Red;Pink 03/28/23 1143   Tissue loss description Full thickness 03/28/23 1143   Black (%), Wound Tissue Color 0 % 03/28/23 1143   Red (%), Wound Tissue Color 100 % 03/28/23 1143   Yellow (%), Wound Tissue Color 0 % 03/28/23 1143   Periwound Area Intact 03/28/23 1143   Wound Edges Callused 03/28/23 1143   Wound Length (cm) 0.9 cm 03/28/23 1143   Wound Width (cm) 2 cm 03/28/23 1143   Wound Depth (cm) 0.2 cm 03/28/23 1143   Wound Volume (cm^3) 0.36 cm^3 03/28/23 1143   Wound Surface Area (cm^2) 1.8 cm^2 03/28/23 1143   Tunneling (depth (cm)/location) 0 03/28/23 1143   Undermining (depth (cm)/location) 0 03/28/23 1143   Care Cleansed with:;Sterile normal saline 03/28/23 1143   Dressing Applied 03/28/23 1143   Periwound Care Moisture  barrier applied;Other (see comments) 03/28/23 1143   Compression Tubular elasticized bandage 03/28/23 1143   Off Loading Football dressing;Off loading shoe 03/28/23 1143           Plan:     Orders Placed This Encounter   Procedures    Change dressing     Clean wound with NS. Gent Patty & Benzoin to periwound. Custom felt pad cut to entire plantar foot with cut out around wound & periwound then 2 U shapped pads placed with ends touching to make oval around cut out. Endoform to wound bed, backed by aquacel, mextra. Foam x2. Football drsg (cast padding x3). Single layer medium gradient tubigrip (size E). Cam walking boot. Pt to return for MD visit in [facility] on Friday 3/31           Follow up in about 3 days (around 3/31/2023) for Wound Care.

## 2023-03-28 NOTE — PROGRESS NOTES
03/28/23 0900   Hyperbaric Pre-Inspection   Mattress cleaned prior to treatment Yes   2 Patient Identifiers Verified Yes   Consent Obtained Yes   Cotton Gown Yes   Patient voided Yes   Drainage Bags Emptied Not applicable   Patient Pregnant Not applicable   Glasses, Jewelry, Makeup, etc. Removed Yes   Dentures, Hearing Aid, Prosthetic Devices Removed Yes   Touch hair to check for hair spray Yes   Cold/Flu Symptoms No   Diabetic Patient Eaten Yes   Medications Given Not applicable   Fears and apprehensions verbalized Yes   Ground Wire Secured Yes   HBO Treatment Course Details   Treatment Course Number 1   Ordering Provider Roberto SCHILLING   HBO Treatment Start Date 12/19/22   HBO Treatment Details   Treatment Number 56   Inpatient Visit No   Total Treatment Length Calc (minutes) 108   Chamber Type Monoplace   Chamber # 2   HBO Dive Log # [id number]   Treatment Protocol 2.0 SURI x 90 minutes w/100% oxygen and no air break   Treatment Details   Dive Rate Down 1.5 psi/minute   Dive Rate Up 2.0 psi/minute   Compress Begins 1 SURI   Clock Time 0921   Tx Pressure Reached 2 SURI   Clock Time 0931   Decompress Begins 2 SURI   Clock Time 1101   Decompress Ends 1 SURI   Clock Time 1109   Pre HBO Vital Signs   /71   Pulse 95   Resp 16   Temp 97.7 °F (36.5 °C)   Blood Glucose 168   Glucose Meter # wc   Pain Level 0   Post HBO Vital Signs   BP (!) 171/88   Pulse 84   Resp 16   Blood Glucose 176   Glucose Meter # wc   Pain Level 0       Arrived awake and alert. ABC's intact. Ambulated without assistance. Cleared for treatment by Roberto SCHILLING Tolerated treatment without complaints. Cleared from chamber without incidents. Follow up tomorrow for HBO.         ICD-10-CM ICD-9-CM   1. Chronic osteomyelitis of right foot  M86.671 730.17   2. Diabetic ulcer of right foot associated with type 2 diabetes mellitus, with fat layer exposed  E11.621 250.80    L97.512 707.15   3. Acute on chronic osteomyelitis  M86.10 730.00    M86.60 730.10   4.  Diabetic ulcer of left midfoot associated with type 2 diabetes mellitus, with bone involvement without evidence of necrosis  E11.621 250.80    L97.426 707.14   5. Diabetic ulcer of right midfoot associated with type 2 diabetes mellitus, with necrosis of bone  E11.621 250.80    L97.414 707.14     730.17        Physician Supervision  I provided direct supervision and was immediately available to furnish assistance and direction throughout the performance of the procedure.    Emergency Response Team  A trained emergency response team and emergency services were available throughout procedure.

## 2023-03-29 ENCOUNTER — HOSPITAL ENCOUNTER (OUTPATIENT)
Dept: WOUND CARE | Facility: HOSPITAL | Age: 53
Discharge: HOME | End: 2023-03-29
Attending: FAMILY MEDICINE
Payer: MEDICARE

## 2023-03-29 DIAGNOSIS — E11.621 DIABETIC ULCER OF RIGHT FOOT ASSOCIATED WITH TYPE 2 DIABETES MELLITUS, WITH FAT LAYER EXPOSED: ICD-10-CM

## 2023-03-29 DIAGNOSIS — L97.426 DIABETIC ULCER OF LEFT MIDFOOT ASSOCIATED WITH TYPE 2 DIABETES MELLITUS, WITH BONE INVOLVEMENT WITHOUT EVIDENCE OF NECROSIS: ICD-10-CM

## 2023-03-29 DIAGNOSIS — E11.621 DIABETIC ULCER OF RIGHT MIDFOOT ASSOCIATED WITH TYPE 2 DIABETES MELLITUS, WITH NECROSIS OF BONE: ICD-10-CM

## 2023-03-29 DIAGNOSIS — M86.60 ACUTE ON CHRONIC OSTEOMYELITIS: ICD-10-CM

## 2023-03-29 DIAGNOSIS — L97.414 DIABETIC ULCER OF RIGHT MIDFOOT ASSOCIATED WITH TYPE 2 DIABETES MELLITUS, WITH NECROSIS OF BONE: ICD-10-CM

## 2023-03-29 DIAGNOSIS — E11.621 DIABETIC ULCER OF LEFT MIDFOOT ASSOCIATED WITH TYPE 2 DIABETES MELLITUS, WITH BONE INVOLVEMENT WITHOUT EVIDENCE OF NECROSIS: ICD-10-CM

## 2023-03-29 DIAGNOSIS — M86.10 ACUTE ON CHRONIC OSTEOMYELITIS: ICD-10-CM

## 2023-03-29 DIAGNOSIS — L97.512 DIABETIC ULCER OF RIGHT FOOT ASSOCIATED WITH TYPE 2 DIABETES MELLITUS, WITH FAT LAYER EXPOSED: ICD-10-CM

## 2023-03-29 DIAGNOSIS — M86.671 CHRONIC OSTEOMYELITIS OF RIGHT FOOT: Primary | ICD-10-CM

## 2023-03-29 LAB
POCT GLUCOSE: 160 MG/DL (ref 70–110)
POCT GLUCOSE: 179 MG/DL (ref 70–110)

## 2023-03-29 PROCEDURE — 99183 PR HYPERBARIC OXYGEN THERAPY ATTENDANCE/SUPERVISION, PER SESSION: ICD-10-PCS | Mod: ,,, | Performed by: FAMILY MEDICINE

## 2023-03-29 PROCEDURE — 99183 HYPERBARIC OXYGEN THERAPY: CPT | Mod: ,,, | Performed by: FAMILY MEDICINE

## 2023-03-29 PROCEDURE — 82962 GLUCOSE BLOOD TEST: CPT | Performed by: FAMILY MEDICINE

## 2023-03-29 PROCEDURE — G0277 HBOT, FULL BODY CHAMBER, 30M: HCPCS | Performed by: FAMILY MEDICINE

## 2023-03-29 NOTE — PROGRESS NOTES
03/29/23 0800   Hyperbaric Pre-Inspection   Mattress cleaned prior to treatment Yes   2 Patient Identifiers Verified Yes   Consent Obtained Yes   Cotton Gown Yes   Patient voided Yes   Drainage Bags Emptied Not applicable   Patient Pregnant Not applicable   Glasses, Jewelry, Makeup, etc. Removed Yes   Dentures, Hearing Aid, Prosthetic Devices Removed Yes   Touch hair to check for hair spray Yes   Cold/Flu Symptoms No   Diabetic Patient Eaten Not applicable   Medications Given Not applicable   Fears and apprehensions verbalized Yes   Ground Wire Secured Yes   HBO Treatment Course Details   Treatment Course Number 1   Ordering Provider Waylon SCHILLING   Indications Diabetic wounds of lower extremities   HBO Treatment Start Date 12/19/22   HBO Treatment Details   Treatment Number 58   Inpatient Visit No   Total Treatment Length Calc (minutes) 108   Chamber Type Monoplace   Chamber # 2   HBO Dive Log # [id number]   Treatment Protocol 2.0 SURI x 90 minutes w/100% oxygen and no air break   Treatment Details   Dive Rate Down 1.5 psi/minute   Dive Rate Up 2.0 psi/minute   Compress Begins 1 SURI   Clock Time 1026   Tx Pressure Reached 2 SURI   Clock Time 1036   Decompress Begins 2 SURI   Clock Time 1206   Decompress Ends 1 SURI   Clock Time 1214   Pre HBO Vital Signs   /78   Pulse 87   Resp 16   Temp 97.7 °F (36.5 °C)   Blood Glucose 179   Glucose Meter # wc   Pain Level 0   Post HBO Vital Signs   BP (!) 170/82   Pulse 81   Resp 16   Blood Glucose 160   Glucose Meter # wc   Pain Level 0           Arrived awake and alert. ABC's intact. Ambulated without assistance. Cleared for treatment by Jennifer SCHILLING Tolerated treatment without complaints. Cleared from chamber without incidents. Follow up tomorrow for HBO.           ICD-10-CM ICD-9-CM   1. Chronic osteomyelitis of right foot  M86.671 730.17   2. Diabetic ulcer of right foot associated with type 2 diabetes mellitus, with fat layer exposed  E11.621 250.80    L97.512 707.15   3.  Acute on chronic osteomyelitis  M86.10 730.00    M86.60 730.10   4. Diabetic ulcer of left midfoot associated with type 2 diabetes mellitus, with bone involvement without evidence of necrosis  E11.621 250.80    L97.426 707.14   5. Diabetic ulcer of right midfoot associated with type 2 diabetes mellitus, with necrosis of bone  E11.621 250.80    L97.414 707.14     730.17        Physician Supervision  I provided direct supervision and was immediately available to furnish assistance and direction throughout the performance of the procedure.    Emergency Response Team  A trained emergency response team and emergency services were available throughout procedure.

## 2023-03-30 ENCOUNTER — HOSPITAL ENCOUNTER (OUTPATIENT)
Dept: WOUND CARE | Facility: HOSPITAL | Age: 53
Discharge: HOME | End: 2023-03-30
Attending: FAMILY MEDICINE
Payer: MEDICARE

## 2023-03-30 DIAGNOSIS — E11.621 DIABETIC ULCER OF RIGHT FOOT ASSOCIATED WITH TYPE 2 DIABETES MELLITUS, WITH FAT LAYER EXPOSED: ICD-10-CM

## 2023-03-30 DIAGNOSIS — M86.60 ACUTE ON CHRONIC OSTEOMYELITIS: ICD-10-CM

## 2023-03-30 DIAGNOSIS — E11.621 DIABETIC ULCER OF RIGHT MIDFOOT ASSOCIATED WITH TYPE 2 DIABETES MELLITUS, WITH NECROSIS OF BONE: ICD-10-CM

## 2023-03-30 DIAGNOSIS — L97.426 DIABETIC ULCER OF LEFT MIDFOOT ASSOCIATED WITH TYPE 2 DIABETES MELLITUS, WITH BONE INVOLVEMENT WITHOUT EVIDENCE OF NECROSIS: ICD-10-CM

## 2023-03-30 DIAGNOSIS — M86.671 CHRONIC OSTEOMYELITIS OF RIGHT FOOT: Primary | ICD-10-CM

## 2023-03-30 DIAGNOSIS — L97.414 DIABETIC ULCER OF RIGHT MIDFOOT ASSOCIATED WITH TYPE 2 DIABETES MELLITUS, WITH NECROSIS OF BONE: ICD-10-CM

## 2023-03-30 DIAGNOSIS — L97.512 DIABETIC ULCER OF RIGHT FOOT ASSOCIATED WITH TYPE 2 DIABETES MELLITUS, WITH FAT LAYER EXPOSED: ICD-10-CM

## 2023-03-30 DIAGNOSIS — M86.10 ACUTE ON CHRONIC OSTEOMYELITIS: ICD-10-CM

## 2023-03-30 DIAGNOSIS — E11.621 DIABETIC ULCER OF LEFT MIDFOOT ASSOCIATED WITH TYPE 2 DIABETES MELLITUS, WITH BONE INVOLVEMENT WITHOUT EVIDENCE OF NECROSIS: ICD-10-CM

## 2023-03-30 LAB
POCT GLUCOSE: 217 MG/DL (ref 70–110)
POCT GLUCOSE: 238 MG/DL (ref 70–110)

## 2023-03-30 PROCEDURE — G0277 HBOT, FULL BODY CHAMBER, 30M: HCPCS

## 2023-03-30 PROCEDURE — 82962 GLUCOSE BLOOD TEST: CPT | Performed by: FAMILY MEDICINE

## 2023-03-30 PROCEDURE — 99183 HYPERBARIC OXYGEN THERAPY: CPT | Mod: ,,, | Performed by: FAMILY MEDICINE

## 2023-03-30 PROCEDURE — 99183 PR HYPERBARIC OXYGEN THERAPY ATTENDANCE/SUPERVISION, PER SESSION: ICD-10-PCS | Mod: ,,, | Performed by: FAMILY MEDICINE

## 2023-03-30 NOTE — PROGRESS NOTES
03/30/23 1000   Hyperbaric Pre-Inspection   Mattress cleaned prior to treatment Yes   2 Patient Identifiers Verified Yes   Consent Obtained Yes   Cotton Gown Yes   Patient voided Yes   Drainage Bags Emptied Not applicable   Patient Pregnant Not applicable   Glasses, Jewelry, Makeup, etc. Removed Yes   Dentures, Hearing Aid, Prosthetic Devices Removed Yes   Touch hair to check for hair spray Yes   Cold/Flu Symptoms No   Diabetic Patient Eaten Yes   Medications Given Not applicable   Fears and apprehensions verbalized Yes   Ground Wire Secured Yes   HBO Treatment Course Details   Treatment Course Number 59   Ordering Provider Waylon SCHILLING   Indications Diabetic wounds of lower extremities   HBO Treatment Start Date 12/19/23   HBO Treatment Details   Treatment Number 59   Total Treatment Length Calc (minutes) 108   Chamber Type Monoplace   Chamber # 1   HBO Dive Log # [id number]   Treatment Protocol 2.0 SURI x 90 minutes w/100% oxygen and no air break   Treatment Details   Dive Rate Down 1.5 psi/minute   Dive Rate Up 2.0 psi/minute   Compress Begins 1 SURI   Clock Time 1020   Tx Pressure Reached 2 SURI   Clock Time 1030   Decompress Begins 2 SURI   Clock Time 1200   Decompress Ends 1 SURI   Clock Time 1208   Pre HBO Vital Signs   /81   Pulse 95   Resp 16   Temp 98.2 °F (36.8 °C)   Blood Glucose 238   Glucose Meter # wc   Pain Level 0   Post HBO Vital Signs   BP (!) 152/88   Pulse 90   Resp 16   Blood Glucose 217   Glucose Meter # wc   Pain Level 0         Arrived awake and alert. ABC's intact. Ambulated without assistance. Cleared for treatment by Jaron SCHILLING Tolerated treatment without complaints. Cleared from chamber without incidents. Follow up tomorrow for HBO.           ICD-10-CM ICD-9-CM   1. Chronic osteomyelitis of right foot  M86.671 730.17   2. Diabetic ulcer of right foot associated with type 2 diabetes mellitus, with fat layer exposed  E11.621 250.80    L97.512 707.15   3. Acute on chronic osteomyelitis   M86.10 730.00    M86.60 730.10   4. Diabetic ulcer of right midfoot associated with type 2 diabetes mellitus, with necrosis of bone  E11.621 250.80    L97.414 707.14     730.17   5. Diabetic ulcer of left midfoot associated with type 2 diabetes mellitus, with bone involvement without evidence of necrosis  E11.621 250.80    L97.426 707.14          Physician Supervision  I provided direct supervision and was immediately available to furnish assistance and direction throughout the performance of the procedure.    Emergency Response Team  A trained emergency response team and emergency services were available throughout procedure.

## 2023-03-31 ENCOUNTER — HOSPITAL ENCOUNTER (OUTPATIENT)
Dept: WOUND CARE | Facility: HOSPITAL | Age: 53
Discharge: HOME | End: 2023-03-31
Attending: FAMILY MEDICINE
Payer: MEDICARE

## 2023-03-31 ENCOUNTER — HOSPITAL ENCOUNTER (OUTPATIENT)
Dept: WOUND CARE | Facility: HOSPITAL | Age: 53
Discharge: HOME | End: 2023-03-31
Attending: PODIATRIST
Payer: MEDICARE

## 2023-03-31 VITALS — HEART RATE: 93 BPM | DIASTOLIC BLOOD PRESSURE: 84 MMHG | SYSTOLIC BLOOD PRESSURE: 155 MMHG | TEMPERATURE: 98 F

## 2023-03-31 DIAGNOSIS — L97.426 DIABETIC ULCER OF LEFT MIDFOOT ASSOCIATED WITH TYPE 2 DIABETES MELLITUS, WITH BONE INVOLVEMENT WITHOUT EVIDENCE OF NECROSIS: ICD-10-CM

## 2023-03-31 DIAGNOSIS — M86.671 CHRONIC OSTEOMYELITIS OF RIGHT FOOT: Primary | ICD-10-CM

## 2023-03-31 DIAGNOSIS — E11.621 DIABETIC ULCER OF RIGHT MIDFOOT ASSOCIATED WITH TYPE 2 DIABETES MELLITUS, WITH NECROSIS OF BONE: ICD-10-CM

## 2023-03-31 DIAGNOSIS — M86.10 ACUTE ON CHRONIC OSTEOMYELITIS: ICD-10-CM

## 2023-03-31 DIAGNOSIS — L97.512 DIABETIC ULCER OF RIGHT FOOT ASSOCIATED WITH TYPE 2 DIABETES MELLITUS, WITH FAT LAYER EXPOSED: ICD-10-CM

## 2023-03-31 DIAGNOSIS — M86.60 ACUTE ON CHRONIC OSTEOMYELITIS: ICD-10-CM

## 2023-03-31 DIAGNOSIS — E11.621 DIABETIC ULCER OF LEFT MIDFOOT ASSOCIATED WITH TYPE 2 DIABETES MELLITUS, WITH BONE INVOLVEMENT WITHOUT EVIDENCE OF NECROSIS: Primary | ICD-10-CM

## 2023-03-31 DIAGNOSIS — E11.621 DIABETIC ULCER OF LEFT MIDFOOT ASSOCIATED WITH TYPE 2 DIABETES MELLITUS, WITH BONE INVOLVEMENT WITHOUT EVIDENCE OF NECROSIS: ICD-10-CM

## 2023-03-31 DIAGNOSIS — L97.414 DIABETIC ULCER OF RIGHT MIDFOOT ASSOCIATED WITH TYPE 2 DIABETES MELLITUS, WITH NECROSIS OF BONE: ICD-10-CM

## 2023-03-31 DIAGNOSIS — L97.426 DIABETIC ULCER OF LEFT MIDFOOT ASSOCIATED WITH TYPE 2 DIABETES MELLITUS, WITH BONE INVOLVEMENT WITHOUT EVIDENCE OF NECROSIS: Primary | ICD-10-CM

## 2023-03-31 DIAGNOSIS — E11.621 DIABETIC ULCER OF RIGHT FOOT ASSOCIATED WITH TYPE 2 DIABETES MELLITUS, WITH FAT LAYER EXPOSED: ICD-10-CM

## 2023-03-31 DIAGNOSIS — M86.671 CHRONIC OSTEOMYELITIS OF RIGHT FOOT: ICD-10-CM

## 2023-03-31 LAB
POCT GLUCOSE: 147 MG/DL (ref 70–110)
POCT GLUCOSE: 178 MG/DL (ref 70–110)

## 2023-03-31 PROCEDURE — 11042 DBRDMT SUBQ TIS 1ST 20SQCM/<: CPT | Performed by: PODIATRIST

## 2023-03-31 PROCEDURE — 99183 PR HYPERBARIC OXYGEN THERAPY ATTENDANCE/SUPERVISION, PER SESSION: ICD-10-PCS | Mod: ,,, | Performed by: FAMILY MEDICINE

## 2023-03-31 PROCEDURE — 11042 DEBRIDEMENT: ICD-10-PCS | Mod: ,,, | Performed by: PODIATRIST

## 2023-03-31 PROCEDURE — 99499 NO LOS: ICD-10-PCS | Mod: ,,, | Performed by: PODIATRIST

## 2023-03-31 PROCEDURE — G0277 HBOT, FULL BODY CHAMBER, 30M: HCPCS | Performed by: FAMILY MEDICINE

## 2023-03-31 PROCEDURE — 11042 DBRDMT SUBQ TIS 1ST 20SQCM/<: CPT | Mod: ,,, | Performed by: PODIATRIST

## 2023-03-31 PROCEDURE — 99499 UNLISTED E&M SERVICE: CPT | Mod: ,,, | Performed by: PODIATRIST

## 2023-03-31 PROCEDURE — 82962 GLUCOSE BLOOD TEST: CPT | Mod: 91 | Performed by: FAMILY MEDICINE

## 2023-03-31 PROCEDURE — 99183 HYPERBARIC OXYGEN THERAPY: CPT | Mod: ,,, | Performed by: FAMILY MEDICINE

## 2023-03-31 NOTE — PROGRESS NOTES
03/31/23 0930   Hyperbaric Pre-Inspection   Mattress cleaned prior to treatment Yes   2 Patient Identifiers Verified Yes   Consent Obtained Yes   Cotton Gown No   Patient voided Yes   Drainage Bags Emptied Yes   Patient Pregnant Not applicable   Glasses, Jewelry, Makeup, etc. Removed Yes   Dentures, Hearing Aid, Prosthetic Devices Removed Yes   Touch hair to check for hair spray Yes   Cold/Flu Symptoms No   Diabetic Patient Eaten Yes   Medications Given Not applicable   Fears and apprehensions verbalized Yes   Ground Wire Secured Yes   HBO Treatment Course Details   Treatment Course Number 1   Ordering Provider Waylon SCHILLING   Indications Diabetic wounds of lower extremities   HBO Treatment Start Date 12/29/22   HBO Treatment Details   Treatment Number 60   Inpatient Visit No   Total Treatment Length Calc (minutes) 108   Chamber Type Monoplace   Chamber # 1   HBO Dive Log # 2807   Treatment Protocol 2.0 SURI x 90 minutes w/100% oxygen and no air break   Treatment Details   Dive Rate Down 1.5 psi/minute   Dive Rate Up 2.0 psi/minute   Compress Begins 1 SURI   Clock Time 0955   Tx Pressure Reached 2 SURI   Clock Time 1005   Decompress Begins 2 SURI   Clock Time 1135   Decompress Ends 1 SURI   Clock Time 1143   Pre HBO Vital Signs   BP (!) 155/84   Pulse 93   Resp 16   Temp 98.4 °F (36.9 °C)   Blood Glucose 175   Glucose Meter # wc   Pain Level 0   Post HBO Vital Signs   BP (!) 165/84   Pulse 84   Resp 16   Blood Glucose 147   Glucose Meter # wc   Pain Level 0         Arrived awake and alert. ABC's intact. Ambulated without assistance. Cleared for treatment by Jennifer SCHILLING Tolerated treatment without complaints. Cleared from chamber without incidents. Follow up in wound care on Tuesday for nurse visit.         ICD-10-CM ICD-9-CM   1. Chronic osteomyelitis of right foot  M86.671 730.17   2. Diabetic ulcer of right foot associated with type 2 diabetes mellitus, with fat layer exposed  E11.621 250.80    L97.512 707.15   3.  Acute on chronic osteomyelitis  M86.10 730.00    M86.60 730.10   4. Diabetic ulcer of left midfoot associated with type 2 diabetes mellitus, with bone involvement without evidence of necrosis  E11.621 250.80    L97.426 707.14   5. Diabetic ulcer of right midfoot associated with type 2 diabetes mellitus, with necrosis of bone  E11.621 250.80    L97.414 707.14     730.17        Physician Supervision  I provided direct supervision and was immediately available to furnish assistance and direction throughout the performance of the procedure.    Emergency Response Team  A trained emergency response team and emergency services were available throughout procedure.

## 2023-03-31 NOTE — PROGRESS NOTES
[facility]                Progress Note    Subjective:       Patient ID: Indio Ryder Jr. is a 52 y.o. male.    Chief Complaint: Wound Check    Follow up wound care visit. Patient ambulated to exam room with prescribed Darco on Right Foot and CAM boot on LLE. No c/o pain at present. Denies fever, chills or flu-like symptoms. Dressing's intact with a small amount of serosanguineous, non-malodor drainage from Left Foot wound.      Review of Systems   Constitutional:  Negative for appetite change, chills, fatigue and fever.   HENT:  Negative for hearing loss.    Eyes:  Negative for photophobia and visual disturbance.   Respiratory:  Negative for cough, chest tightness, shortness of breath and wheezing.    Cardiovascular:  Positive for leg swelling. Negative for chest pain and palpitations.   Gastrointestinal:  Negative for constipation, diarrhea, nausea and vomiting.   Endocrine: Negative for cold intolerance and heat intolerance.   Genitourinary:  Negative for flank pain.   Musculoskeletal:  Positive for gait problem and myalgias. Negative for neck pain and neck stiffness.   Skin:  Positive for wound. Negative for color change.   Neurological:  Positive for numbness. Negative for weakness, light-headedness and headaches.        + paresthesia    Psychiatric/Behavioral:  Negative for sleep disturbance.        Objective:        Physical Exam  Constitutional:       Appearance: He is well-developed.   Cardiovascular:      Comments: Dorsalis pedis and posterior tibial pulses are palpable Skin is atrophic, slightly hyperpigmented, and mildly edematous      Musculoskeletal:         General: No tenderness. Normal range of motion.      Comments: Muscle strength is 5/5 in all groups bilaterally.    S/p partial 5th ray amp b/l    S/p hallux amp right    Fat pad atrophy to heels and met heads bilateral       Skin:     General: Skin is warm and dry.      Coloration: Skin is not  jaundiced, mottled or sallow.      Findings: Wound (see below) present. No abscess or ecchymosis.      Comments:        Neurological:      Mental Status: He is alert and oriented to person, place, and time.      Comments: [place]en[place] 5.07 monofilamant testing is diminished Kenji feet. Sharp/dull sensation diminished Bilaterally. Light touch absent Bilaterally.       Psychiatric:         Behavior: Behavior normal.       Vitals:    03/31/23 0913   BP: (!) 155/84   Pulse: 93   Temp: 98.4 °F (36.9 °C)       Assessment:           ICD-10-CM ICD-9-CM   1. Diabetic ulcer of left midfoot associated with type 2 diabetes mellitus, with bone involvement without evidence of necrosis  E11.621 250.80    L97.426 707.14   2. Chronic osteomyelitis of right foot  M86.671 730.17   3. Diabetic ulcer of right foot associated with type 2 diabetes mellitus, with fat layer exposed  E11.621 250.80    L97.512 707.15            (Retired) Wound 04/08/22 1137 Diabetic Ulcer Left plantar;lateral Foot (Active)   04/08/22 1137    Pre-existing: Yes   Primary Wound Type: Diabetic ulc   Side: Left   Orientation: plantar;lateral   Location: Foot   Wound Number:    Ankle-Brachial Index:    Pulses:    Removal Indication and Assessment:    Wound Outcome:    (Retired) Wound Type:    (Retired) Wound Length (cm):    (Retired) Wound Width (cm):    (Retired) Depth (cm):    Wound Description (Comments):    Removal Indications:    Wound Image   03/31/23 1711   Wound WDL ex 03/31/23 1711   Dressing Appearance Intact;Moist drainage 03/31/23 1711   Drainage Amount Small 03/31/23 1711   Drainage Characteristics/Odor Serosanguineous;No odor 03/31/23 1711   Appearance Red;Moist 03/31/23 1711   Tissue loss description Partial thickness 03/31/23 1711   Black (%), Wound Tissue Color 0 % 03/31/23 1711   Red (%), Wound Tissue Color 100 % 03/31/23 1711   Yellow (%), Wound Tissue Color 0 % 03/31/23 1711   Periwound Area Dry;Pale white 03/31/23 1711   Wound Edges  Defined;Open 03/31/23 1711   Wound Length (cm) 0.8 cm 03/31/23 1711   Wound Width (cm) 2 cm 03/31/23 1711   Wound Depth (cm) 0.2 cm 03/31/23 1711   Wound Volume (cm^3) 0.32 cm^3 03/31/23 1711   Wound Surface Area (cm^2) 1.6 cm^2 03/31/23 1711   Tunneling (depth (cm)/location) 0 03/31/23 1711   Undermining (depth (cm)/location) 0 03/31/23 1711   Care Cleansed with:;Antimicrobial agent;Sterile normal saline 03/31/23 1711   Dressing Changed 03/31/23 1711   Periwound Care Moisture barrier applied;Skin barrier film applied 03/31/23 1711   Compression Tubular elasticized bandage 03/31/23 1711   Off Loading Football dressing;Off loading shoe 03/31/23 1711   Dressing Change Due 04/05/23 03/31/23 1711           Plan:            Debridement    Date/Time: 3/31/2023 9:00 AM  Performed by: Marivel Peterson DPM  Authorized by: Marivel Peterson DPM       Wound Details:    Location:  Left foot    Location:  Left Midfoot    Type of Debridement:  Excisional       Length (cm):  0.8       Area (sq cm):  1.6       Width (cm):  2       Percent Debrided (%):  100       Depth (cm):  0.2       Total Area Debrided (sq cm):  1.6    Depth of debridement:  Subcutaneous tissue    Tissue debrided:  Dermis, Epidermis and Subcutaneous    Devitalized tissue debrided:  Callus and Fibrin    Instruments:  Curette, Scissors and Forceps  Bleeding:  Minimal  Hemostasis Achieved: Yes  Method Used:  Pressure  Patient tolerance:  Patient tolerated the procedure well with no immediate complications      Return to clinic on Wednesday to see MD due to upcoming holiday.     Nurse Visit on Tuesday 04/11 then return to Podiatrist on 04/14.      Tissue pathology and/or culture taken     [] Yes      [x] No  Sharp debridement performed                   [x] Yes       [] No  Labs ordered     [] Yes       [x] No  Imaging ordered    [] Yes      [x] No    Orders Placed This Encounter   Procedures    Debridement     This order was created via procedure documentation      Standing Status:   Standing     Number of Occurrences:   1    Change dressing     Clean wound with NS. Gent Patty & Benzoin to periwound. Custom felt pad cut to entire plantar foot with cut out around wound & periwound then tear drop pad (custom cut by MD) to make oval around cut out. Cellerate (by MD) to wound bed, backed by Drawtex cut to size, Mextra (short x1). Abram foam long x2. Football drsg (cast padding x3). Single layer medium gradient Tubigrip (size D, size E n/a) laid beneath foot as sock and secured with Medipore Tape. Cam walking boot. Pt to return for MD visit in [facility] on Wednesday 04/05.        Follow up in about 5 days (around 4/5/2023) for wound care.

## 2023-04-02 NOTE — PROGRESS NOTES
[facility]                Progress Note    Subjective:       Patient ID: Indio Ryder Jr. is a 52 y.o. male.    Chief Complaint: No chief complaint on file.    Returns to clinic for follow up of left midfoot wound.  He has been coming to HBOT daily.  He has kept dressing intact and presented to nursing wound care visit. No new pedal complaints.       Review of Systems   Constitutional:  Negative for appetite change, chills, fatigue and fever.   HENT:  Negative for hearing loss.    Eyes:  Negative for photophobia and visual disturbance.   Respiratory:  Negative for cough, chest tightness, shortness of breath and wheezing.    Cardiovascular:  Positive for leg swelling. Negative for chest pain and palpitations.   Gastrointestinal:  Negative for constipation, diarrhea, nausea and vomiting.   Endocrine: Negative for cold intolerance and heat intolerance.   Genitourinary:  Negative for flank pain.   Musculoskeletal:  Positive for gait problem and myalgias. Negative for neck pain and neck stiffness.   Skin:  Positive for wound. Negative for color change.   Neurological:  Positive for numbness. Negative for weakness, light-headedness and headaches.        + paresthesia    Psychiatric/Behavioral:  Negative for sleep disturbance.        Objective:        Physical Exam  Constitutional:       Appearance: He is well-developed.   Cardiovascular:      Comments: Dorsalis pedis and posterior tibial pulses are palpable Skin is atrophic, slightly hyperpigmented, and mildly edematous      Musculoskeletal:         General: No tenderness. Normal range of motion.      Comments: Muscle strength is 5/5 in all groups bilaterally.    S/p partial 5th ray amp b/l    S/p hallux amp right    Fat pad atrophy to heels and met heads bilateral       Skin:     General: Skin is warm and dry.      Coloration: Skin is not jaundiced, mottled or sallow.      Findings: Wound (see below) present. No abscess  or ecchymosis.      Comments:        Neurological:      Mental Status: He is alert and oriented to person, place, and time.      Comments: [place]-Nenita 5.07 monofilamant testing is diminished [place] feet. Sharp/dull sensation diminished Bilaterally. Light touch absent Bilaterally.       Psychiatric:         Behavior: Behavior normal.       There were no vitals filed for this visit.    Assessment:           ICD-10-CM ICD-9-CM   1. Diabetic ulcer of left midfoot associated with type 2 diabetes mellitus, with bone involvement without evidence of necrosis  E11.621 250.80    L97.426 707.14                Plan:            Education about the prevention of limb loss.    Discussed wound healing cycle, skin integrity, ways to care for skin.Counseled patient on the effects of PVD and blood glucose on healing. He verbalizes understanding that it can increase the chances of delayed healing and this prolonged exposure leads to infection or progression of infection which subsequently can result in loss of limb.    Adequate vitamin supplementation, protein intake, and hydration - discussed with patient    The wound is cleansed of foreign material as much as possible and the base inspected for bone or abscess.  Base is granular and without bone nor joint exposure     Follow-up: next week but should call [facility] immediately if any signs of infection, such as fever, chills, sweats, increased redness or pain.    Short-term goals include maintaining good offloading and minimizing bioburden, promoting granulation and epithelialization to healing.  Long-term goals include keeping the wound healed by good offloading and medical management under the direction of internist.    Shoe inspection. Diabetic Foot Education. Patient reminded of the importance of good nutrition and blood sugar control to help prevent podiatric complications of diabetes. Patient instructed on proper foot hygeine. We discussed wearing proper shoe gear, daily foot  inspections, never walking without protective shoe gear, never putting sharp instruments to feet.          Tissue pathology and/or culture taken     [] Yes      [] No  Sharp debridement performed                   [] Yes       [] No  Labs ordered     [] Yes       [] No  Imaging ordered    [] Yes      [] No    No orders of the defined types were placed in this encounter.       No follow-ups on file.

## 2023-04-05 ENCOUNTER — HOSPITAL ENCOUNTER (OUTPATIENT)
Dept: WOUND CARE | Facility: HOSPITAL | Age: 53
Discharge: HOME | End: 2023-04-05
Payer: MEDICARE

## 2023-04-05 VITALS — TEMPERATURE: 98 F | SYSTOLIC BLOOD PRESSURE: 152 MMHG | HEART RATE: 101 BPM | DIASTOLIC BLOOD PRESSURE: 81 MMHG

## 2023-04-05 DIAGNOSIS — L97.426 DIABETIC ULCER OF LEFT MIDFOOT ASSOCIATED WITH TYPE 2 DIABETES MELLITUS, WITH BONE INVOLVEMENT WITHOUT EVIDENCE OF NECROSIS: Primary | ICD-10-CM

## 2023-04-05 DIAGNOSIS — E11.621 DIABETIC ULCER OF LEFT MIDFOOT ASSOCIATED WITH TYPE 2 DIABETES MELLITUS, WITH BONE INVOLVEMENT WITHOUT EVIDENCE OF NECROSIS: Primary | ICD-10-CM

## 2023-04-05 PROCEDURE — 99213 OFFICE O/P EST LOW 20 MIN: CPT

## 2023-04-11 ENCOUNTER — HOSPITAL ENCOUNTER (OUTPATIENT)
Dept: WOUND CARE | Facility: HOSPITAL | Age: 53
Discharge: HOME | End: 2023-04-11
Attending: FAMILY MEDICINE
Payer: MEDICARE

## 2023-04-11 VITALS — SYSTOLIC BLOOD PRESSURE: 159 MMHG | DIASTOLIC BLOOD PRESSURE: 68 MMHG | TEMPERATURE: 98 F | HEART RATE: 69 BPM

## 2023-04-11 DIAGNOSIS — E11.621 DIABETIC ULCER OF LEFT MIDFOOT ASSOCIATED WITH TYPE 2 DIABETES MELLITUS, WITH BONE INVOLVEMENT WITHOUT EVIDENCE OF NECROSIS: Primary | ICD-10-CM

## 2023-04-11 DIAGNOSIS — L97.426 DIABETIC ULCER OF LEFT MIDFOOT ASSOCIATED WITH TYPE 2 DIABETES MELLITUS, WITH BONE INVOLVEMENT WITHOUT EVIDENCE OF NECROSIS: Primary | ICD-10-CM

## 2023-04-11 PROCEDURE — 99212 OFFICE O/P EST SF 10 MIN: CPT

## 2023-04-11 NOTE — PROGRESS NOTES
[facility]  [address]  [address]  Nurse Visit    Subjective:       Patient seen in clinic today. Patient ambulated to exam room unaided with prescribed CAM boot on RLE and own tennis shoe with shoelift on Left Foot. He denies fever, chills or flu-like at present. Drainage to Left Foot is a small amount of serosanguineous, non-malodor. Dressing changed as ordered.      Assessment:          (Retired) Wound 04/08/22 1137 Diabetic Ulcer Left plantar;lateral Foot (Active)   04/08/22 1137    Pre-existing: Yes   Primary Wound Type: Diabetic ulc   Side: Left   Orientation: plantar;lateral   Location: Foot   Wound Number:    Ankle-Brachial Index:    Pulses:    Removal Indication and Assessment:    Wound Outcome:    (Retired) Wound Type:    (Retired) Wound Length (cm):    (Retired) Wound Width (cm):    (Retired) Depth (cm):    Wound Description (Comments):    Removal Indications:    Wound WDL ex 04/11/23 1727   Dressing Appearance Intact;Moist drainage 04/11/23 1727   Drainage Amount Small 04/11/23 1727   Drainage Characteristics/Odor Serosanguineous;No odor 04/11/23 1727   Appearance Pink;Moist 04/11/23 1727   Tissue loss description Partial thickness 04/11/23 1727   Black (%), Wound Tissue Color 0 % 04/11/23 1727   Red (%), Wound Tissue Color 100 % 04/11/23 1727   Yellow (%), Wound Tissue Color 0 % 04/11/23 1727   Periwound Area Dry 04/11/23 1727   Wound Edges Defined;Open 04/11/23 1727   Care Cleansed with:;Antimicrobial agent;Sterile normal saline 04/11/23 1727   Dressing Changed 04/11/23 1727   Periwound Care Moisture barrier applied 04/11/23 1727   Compression Tubular elasticized bandage 04/11/23 1727   Off Loading Football dressing;Off loading shoe 04/11/23 1727   Dressing Change Due 04/14/23 04/11/23 1727           Plan:     No orders of the defined types were placed in this encounter.          Follow up in about 3 days (around 4/14/2023) for wound care.

## 2023-04-14 ENCOUNTER — HOSPITAL ENCOUNTER (OUTPATIENT)
Dept: WOUND CARE | Facility: HOSPITAL | Age: 53
Discharge: HOME | End: 2023-04-14
Attending: PODIATRIST
Payer: MEDICARE

## 2023-04-14 VITALS — TEMPERATURE: 97 F | DIASTOLIC BLOOD PRESSURE: 75 MMHG | SYSTOLIC BLOOD PRESSURE: 159 MMHG | HEART RATE: 95 BPM

## 2023-04-14 DIAGNOSIS — L97.426 DIABETIC ULCER OF LEFT MIDFOOT ASSOCIATED WITH TYPE 2 DIABETES MELLITUS, WITH BONE INVOLVEMENT WITHOUT EVIDENCE OF NECROSIS: Primary | ICD-10-CM

## 2023-04-14 DIAGNOSIS — E11.621 DIABETIC ULCER OF LEFT MIDFOOT ASSOCIATED WITH TYPE 2 DIABETES MELLITUS, WITH BONE INVOLVEMENT WITHOUT EVIDENCE OF NECROSIS: Primary | ICD-10-CM

## 2023-04-14 PROCEDURE — 99499 NO LOS: ICD-10-PCS | Mod: ,,, | Performed by: PODIATRIST

## 2023-04-14 PROCEDURE — 11042 DBRDMT SUBQ TIS 1ST 20SQCM/<: CPT | Mod: ,,, | Performed by: PODIATRIST

## 2023-04-14 PROCEDURE — 11046 DBRDMT MUSC&/FSCA EA ADDL: CPT

## 2023-04-14 PROCEDURE — 11042 DEBRIDEMENT: ICD-10-PCS | Mod: ,,, | Performed by: PODIATRIST

## 2023-04-14 PROCEDURE — 99499 UNLISTED E&M SERVICE: CPT | Mod: ,,, | Performed by: PODIATRIST

## 2023-04-14 PROCEDURE — 11042 DBRDMT SUBQ TIS 1ST 20SQCM/<: CPT

## 2023-04-14 NOTE — PROGRESS NOTES
[facility]                Progress Note    Subjective:       Patient ID: Indio Ryder Jr. is a 52 y.o. male.    Chief Complaint: Wound Care and Wound Check    Follow up visit for wound to left plantar foot. Patient walked into clinic with boot to left leg, dressing clean and intact.  Denies any pain.    Review of Systems   Constitutional:  Negative for appetite change, chills, fatigue and fever.   HENT:  Negative for hearing loss.    Eyes:  Negative for photophobia and visual disturbance.   Respiratory:  Negative for cough, chest tightness, shortness of breath and wheezing.    Cardiovascular:  Positive for leg swelling. Negative for chest pain and palpitations.   Gastrointestinal:  Negative for constipation, diarrhea, nausea and vomiting.   Endocrine: Negative for cold intolerance and heat intolerance.   Genitourinary:  Negative for flank pain.   Musculoskeletal:  Positive for gait problem and myalgias. Negative for neck pain and neck stiffness.   Skin:  Positive for wound. Negative for color change.   Neurological:  Positive for numbness. Negative for weakness, light-headedness and headaches.        + paresthesia    Psychiatric/Behavioral:  Negative for sleep disturbance.        Objective:        Physical Exam  Constitutional:       Appearance: He is well-developed.   Cardiovascular:      Comments: Dorsalis pedis and posterior tibial pulses are palpable Skin is atrophic, slightly hyperpigmented, and mildly edematous      Musculoskeletal:         General: No tenderness. Normal range of motion.      Comments: Muscle strength is 5/5 in all groups bilaterally.    S/p partial 5th ray amp b/l    S/p hallux amp right    Fat pad atrophy to heels and met heads bilateral       Skin:     General: Skin is warm and dry.      Coloration: Skin is not jaundiced, mottled or sallow.      Findings: Wound (see below) present. No abscess or ecchymosis.      Comments:         Neurological:      Mental Status: He is alert and oriented to person, place, and time.      Comments: [place]-Nenita 5.07 monofilamant testing is diminished Kenji feet. Sharp/dull sensation diminished Bilaterally. Light touch absent Bilaterally.       Psychiatric:         Behavior: Behavior normal.       Vitals:    04/14/23 1108   BP: (!) 159/75   Pulse: 95   Temp: 97.1 °F (36.2 °C)       Assessment:           ICD-10-CM ICD-9-CM   1. Diabetic ulcer of left midfoot associated with type 2 diabetes mellitus, with bone involvement without evidence of necrosis  E11.621 250.80    L97.426 707.14            (Retired) Wound 04/08/22 1137 Diabetic Ulcer Left plantar;lateral Foot (Active)   04/08/22 1137    Pre-existing: Yes   Primary Wound Type: Diabetic ulc   Side: Left   Orientation: plantar;lateral   Location: Foot   Wound Number:    Ankle-Brachial Index:    Pulses:    Removal Indication and Assessment:    Wound Outcome:    (Retired) Wound Type:    (Retired) Wound Length (cm):    (Retired) Wound Width (cm):    (Retired) Depth (cm):    Wound Description (Comments):    Removal Indications:    Wound Image   04/14/23 1113   Wound WDL ex 04/14/23 1113   Dressing Appearance Dried drainage 04/14/23 1113   Drainage Amount Small 04/14/23 1113   Drainage Characteristics/Odor Serosanguineous 04/14/23 1113   Appearance Pink 04/14/23 1113   Tissue loss description Full thickness 04/14/23 1113   Black (%), Wound Tissue Color 0 % 04/14/23 1113   Red (%), Wound Tissue Color 100 % 04/14/23 1113   Yellow (%), Wound Tissue Color 0 % 04/14/23 1113   Periwound Area Dry 04/14/23 1113   Wound Edges Callused 04/14/23 1113   Wound Length (cm) 0.4 cm 04/14/23 1113   Wound Width (cm) 0.3 cm 04/14/23 1113   Wound Depth (cm) 0.4 cm 04/14/23 1113   Wound Volume (cm^3) 0.048 cm^3 04/14/23 1113   Wound Surface Area (cm^2) 0.12 cm^2 04/14/23 1113   Care Cleansed with:;Antimicrobial agent;Sterile normal saline 04/14/23 1114   Periwound Care Absorptive  dressing applied;Skin barrier film applied;Moisturizer applied 04/14/23 1113   Off Loading Football dressing;Off loading shoe 04/14/23 1113   Dressing Change Due 04/21/23 04/14/23 1113           Plan:              Education about the prevention of limb loss.    Discussed wound healing cycle, skin integrity, ways to care for skin.Counseled patient on the effects of biomechanical pressure, PVD and blood glucose on healing. He verbalizes understanding that it can increase the chances of delayed healing and this prolonged exposure leads to infection or progression of infection which subsequently can result in loss of limb.    Adequate vitamin supplementation, protein intake, and hydration - discussed with patient    The wound is cleansed of foreign material as much as possible and the base inspected for bone or abscess.  Base is granular and without bone nor joint exposure     Follow-up: next week but should call [facility] immediately if any signs of infection, such as fever, chills, sweats, increased redness or pain.    Short-term goals include maintaining good offloading and minimizing bioburden, promoting granulation and epithelialization to healing.  Long-term goals include keeping the wound healed by good offloading and medical management under the direction of internist.    Shoe inspection. Diabetic Foot Education. Patient reminded of the importance of good nutrition and blood sugar control to help prevent podiatric complications of diabetes. Patient instructed on proper foot hygeine. We discussed wearing proper shoe gear, daily foot inspections, never walking without protective shoe gear, never putting sharp instruments to feet.      ruiz Talley    Debridement    Date/Time: 4/14/2023 10:52 AM  Performed by: Marivel Peterson DPM  Authorized by: Marivel Peterson DPM       Wound Details:    Location:  Left foot    Location:  Left Midfoot    Type of Debridement:  Excisional       Length (cm):  0.4       Area  (sq cm):  0.12       Width (cm):  0.3       Percent Debrided (%):  100       Depth (cm):  0.4       Total Area Debrided (sq cm):  0.12    Depth of debridement:  Subcutaneous tissue    Tissue debrided:  Dermis, Epidermis and Subcutaneous    Devitalized tissue debrided:  Fibrin and Callus    Instruments:  Curette  Bleeding:  Minimal  Hemostasis Achieved: Yes  Method Used:  Pressure  Patient tolerance:  Patient tolerated the procedure well with no immediate complications      Tissue pathology and/or culture taken     [] Yes      [x] No  Sharp debridement performed                   [x] Yes       [] No  Labs ordered     [] Yes       [x] No  Imaging ordered    [] Yes      [x] No    Orders Placed This Encounter   Procedures    Debridement     This order was created via procedure documentation     Standing Status:   Standing     Number of Occurrences:   1    Change dressing     Clean wound with NS. Gent Patty & Benzoin to periwound. Custom felt pad cut to entire plantar foot with cut out around wound & periwound then tear drop pad (custom cut by MD) to make oval around cut out. Cellerate (by MD) to wound bed, backed by Drawtex cut to size, Mextra (short x1). Abram foam long x2. Football drsg (cast padding x3). Single layer medium gradient Tubigrip (size D, size E n/a) laid beneath foot as sock and secured with Medipore Tape. Football to left foot. Cam walking boot. Pt to return for MD visit in [facility] on Wednesday 04/05.    Change dressing     Clean wound with NS. Gent Patty & Benzoin to periwound. Custom felt pad cut to entire plantar foot with cut out around wound & periwound then tear drop pad (custom cut by MD) to make oval around cut out. Cellerate (by MD) to wound bed, backed by Drawtex cut to size, Mextra (short x1). Abram foam long x2. Football drsg (cast padding x3). Single layer medium gradient Tubigrip (size D, size E n/a) laid beneath foot as sock and secured with Medipore Tape. Cam walking boot. Pt to return  for MD visit in [facility] on Wednesday 04/05.        Follow up in about 1 week (around 4/21/2023) for wound care.

## 2023-04-19 ENCOUNTER — HOSPITAL ENCOUNTER (OUTPATIENT)
Dept: WOUND CARE | Facility: HOSPITAL | Age: 53
Discharge: HOME | End: 2023-04-19
Attending: FAMILY MEDICINE
Payer: MEDICARE

## 2023-04-19 VITALS — SYSTOLIC BLOOD PRESSURE: 172 MMHG | TEMPERATURE: 97 F | DIASTOLIC BLOOD PRESSURE: 89 MMHG | HEART RATE: 93 BPM

## 2023-04-19 DIAGNOSIS — E11.621 DIABETIC ULCER OF LEFT MIDFOOT ASSOCIATED WITH TYPE 2 DIABETES MELLITUS, WITH BONE INVOLVEMENT WITHOUT EVIDENCE OF NECROSIS: Primary | ICD-10-CM

## 2023-04-19 DIAGNOSIS — L97.426 DIABETIC ULCER OF LEFT MIDFOOT ASSOCIATED WITH TYPE 2 DIABETES MELLITUS, WITH BONE INVOLVEMENT WITHOUT EVIDENCE OF NECROSIS: Primary | ICD-10-CM

## 2023-04-19 PROCEDURE — 99212 OFFICE O/P EST SF 10 MIN: CPT

## 2023-04-19 NOTE — PROGRESS NOTES
[facility]  [place]  [id number]  Nurse Visit    Subjective:       Patient ambulated to exam room unaided with prescribed CAM boot on RLE and own tennis shoe with shoe lift on Left Foot. He denies fever, chills or flu-like at present. LLE has hives noted, applied triamcinolone cream 0.1 % after consulting with MD. Drainage to Left Foot is a small amount of serosanguineous, non-malodor. Dressing changed as ordered.      Assessment:          (Retired) Wound 04/08/22 1137 Diabetic Ulcer Left plantar;lateral Foot (Active)   04/08/22 1137    Pre-existing: Yes   Primary Wound Type: Diabetic ulc   Side: Left   Orientation: plantar;lateral   Location: Foot   Wound Number:    Ankle-Brachial Index:    Pulses:    Removal Indication and Assessment:    Wound Outcome:    (Retired) Wound Type:    (Retired) Wound Length (cm):    (Retired) Wound Width (cm):    (Retired) Depth (cm):    Wound Description (Comments):    Removal Indications:    Wound WDL ex 04/19/23 1051   Dressing Appearance Intact;Moist drainage 04/19/23 1051   Drainage Amount Small 04/19/23 1051   Drainage Characteristics/Odor Serosanguineous;No odor 04/19/23 1051   Appearance Red;Moist 04/19/23 1051   Tissue loss description Partial thickness 04/19/23 1051   Black (%), Wound Tissue Color 0 % 04/19/23 1051   Red (%), Wound Tissue Color 100 % 04/19/23 1051   Yellow (%), Wound Tissue Color 0 % 04/19/23 1051   Periwound Area Dry 04/19/23 1051   Wound Edges Callused;Defined;Open 04/19/23 1051   Care Cleansed with:;Antimicrobial agent;Sterile normal saline 04/19/23 1051   Dressing Changed 04/19/23 1051   Periwound Care Moisture barrier applied;Skin barrier film applied 04/19/23 1051   Compression Tubular elasticized bandage 04/19/23 1051   Off Loading Football dressing;Off loading shoe 04/19/23 1051   Dressing Change Due 04/21/23 04/19/23 1051           Plan:     No orders of the defined types were placed in this  encounter.          Follow up in about 2 days (around 4/21/2023) for wound care.

## 2023-04-21 ENCOUNTER — HOSPITAL ENCOUNTER (OUTPATIENT)
Dept: WOUND CARE | Facility: HOSPITAL | Age: 53
Discharge: HOME | End: 2023-04-21
Attending: PODIATRIST
Payer: MEDICARE

## 2023-04-21 VITALS — SYSTOLIC BLOOD PRESSURE: 151 MMHG | HEART RATE: 78 BPM | TEMPERATURE: 97 F | DIASTOLIC BLOOD PRESSURE: 77 MMHG

## 2023-04-21 DIAGNOSIS — E11.621 DIABETIC ULCER OF LEFT MIDFOOT ASSOCIATED WITH TYPE 2 DIABETES MELLITUS, WITH BONE INVOLVEMENT WITHOUT EVIDENCE OF NECROSIS: Primary | ICD-10-CM

## 2023-04-21 DIAGNOSIS — L97.426 DIABETIC ULCER OF LEFT MIDFOOT ASSOCIATED WITH TYPE 2 DIABETES MELLITUS, WITH BONE INVOLVEMENT WITHOUT EVIDENCE OF NECROSIS: Primary | ICD-10-CM

## 2023-04-21 PROCEDURE — 15275 SKIN SUB GRAFT FACE/NK/HF/G: CPT | Mod: ,,, | Performed by: PODIATRIST

## 2023-04-21 PROCEDURE — 99499 UNLISTED E&M SERVICE: CPT | Mod: ,,, | Performed by: PODIATRIST

## 2023-04-21 PROCEDURE — 15275 SKIN SUB GRAFT FACE/NK/HF/G: CPT | Performed by: PODIATRIST

## 2023-04-21 PROCEDURE — 99499 NO LOS: ICD-10-PCS | Mod: ,,, | Performed by: PODIATRIST

## 2023-04-21 PROCEDURE — 15271 SKIN SUB GRAFT TRNK/ARM/LEG: CPT | Performed by: PODIATRIST

## 2023-04-21 PROCEDURE — 15275 SKIN SUBSTITUTE: ICD-10-PCS | Mod: ,,, | Performed by: PODIATRIST

## 2023-04-21 NOTE — PROGRESS NOTES
Walked to clinic unaided wearing CAM boot to left leg and Tubigrip to both leg. Wound has red base and no maceration. Neox applied and will need nurse visit on Monday as per Dr Peterson

## 2023-04-21 NOTE — PROGRESS NOTES
[facility]                Progress Note    Subjective:       Patient ID: Indio Ryder Jr. is a 52 y.o. male.    Chief Complaint: Wound Check    Walked to clinic unaided wearing CAM boot to left leg and Tubigrip to both leg. Dressing intact.  No new pedal complaints.    Wound Check    Review of Systems   Constitutional:  Negative for appetite change, chills, fatigue and fever.   HENT:  Negative for hearing loss.    Eyes:  Negative for photophobia and visual disturbance.   Respiratory:  Negative for cough, chest tightness, shortness of breath and wheezing.    Cardiovascular:  Positive for leg swelling. Negative for chest pain and palpitations.   Gastrointestinal:  Negative for constipation, diarrhea, nausea and vomiting.   Endocrine: Negative for cold intolerance and heat intolerance.   Genitourinary:  Negative for flank pain.   Musculoskeletal:  Positive for gait problem and myalgias. Negative for neck pain and neck stiffness.   Skin:  Positive for wound. Negative for color change.   Neurological:  Positive for numbness. Negative for weakness, light-headedness and headaches.        + paresthesia    Psychiatric/Behavioral:  Negative for sleep disturbance.        Objective:        Physical Exam  Constitutional:       Appearance: He is well-developed.   Cardiovascular:      Comments: Dorsalis pedis and posterior tibial pulses are palpable Skin is atrophic, slightly hyperpigmented, and mildly edematous      Musculoskeletal:         General: No tenderness. Normal range of motion.      Comments: Muscle strength is 5/5 in all groups bilaterally.    S/p partial 5th ray amp b/l    S/p hallux amp right    Fat pad atrophy to heels and met heads bilateral       Skin:     General: Skin is warm and dry.      Coloration: Skin is not jaundiced, mottled or sallow.      Findings: Wound (see below) present. No abscess or ecchymosis.      Comments:        Neurological:      Mental  Status: He is alert and oriented to person, place, and time.      Comments: [place]-Nenita 5.07 monofilamant testing is diminished Kenji feet. Sharp/dull sensation diminished Bilaterally. Light touch absent Bilaterally.       Psychiatric:         Behavior: Behavior normal.       Vitals:    04/21/23 1104   BP: (!) 151/77   Pulse: 78   Temp: 97.2 °F (36.2 °C)       Assessment:           ICD-10-CM ICD-9-CM   1. Diabetic ulcer of left midfoot associated with type 2 diabetes mellitus, with bone involvement without evidence of necrosis  E11.621 250.80    L97.426 707.14            (Retired) Wound 04/08/22 1137 Diabetic Ulcer Left plantar;lateral Foot (Active)   04/08/22 1137    Pre-existing: Yes   Primary Wound Type: Diabetic ulc   Side: Left   Orientation: plantar;lateral   Location: Foot   Wound Number:    Ankle-Brachial Index:    Pulses:    Removal Indication and Assessment:    Wound Outcome:    (Retired) Wound Type:    (Retired) Wound Length (cm):    (Retired) Wound Width (cm):    (Retired) Depth (cm):    Wound Description (Comments):    Removal Indications:    Wound Image   04/21/23 1540   Wound WDL ex 04/21/23 1540   Dressing Appearance Dry;Intact 04/21/23 1540   Drainage Amount Scant 04/21/23 1540   Drainage Characteristics/Odor Serosanguineous 04/21/23 1540   Appearance Red 04/21/23 1540   Tissue loss description Full thickness 04/21/23 1540   Red (%), Wound Tissue Color 100 % 04/21/23 1540   Periwound Area Intact;Dry 04/21/23 1540   Wound Edges Defined 04/21/23 1540   Wound Length (cm) 0.9 cm 04/21/23 1540   Wound Width (cm) 0.5 cm 04/21/23 1540   Wound Depth (cm) 0.2 cm 04/21/23 1540   Wound Volume (cm^3) 0.09 cm^3 04/21/23 1540   Wound Surface Area (cm^2) 0.45 cm^2 04/21/23 1540   Care Cleansed with:;Antimicrobial agent;Sterile normal saline 04/21/23 1540   Dressing Changed 04/21/23 1540   Compression Tubular elasticized bandage 04/21/23 1540   Off Loading Football dressing;Off loading shoe 04/21/23 2905    Dressing Change Due 04/28/23 04/21/23 1540           Plan:          Wound has red base and no maceration.     Skin substitute    Date/Time: 4/21/2023 10:42 AM  Performed by: Marivel Peterson DPM  Authorized by: Marivel Peterson DPM     Pre-procedure details:     Preparation: Patient was prepped and draped in usual sterile fashion    Anesthesia (see MAR for exact dosages):     Anesthesia method:  None  Procedure details:     Location:  head/hands/feet/digits/genitalia    Product applied:  Neox 100 or clarix 100    Amount used (cm^2):  6    Amount wasted (cm^2):  0    Secured: Yes      Secured with:  Prolene  Dressing:     Dressing applied:  Adaptic dressing and Steri-Strips    Wrapped with:  Bulky dressing  Post-procedure details:     Patient tolerance of procedure:  Tolerated well, no immediate complications  Comments:      I custom cut and personally applied an offloading pad to be applied to the foot due to deformity, body habitus, activity level        Follow next week with nurse visit on Monday       Tissue pathology and/or culture taken     [] Yes      [x] No  Sharp debridement performed                   [x] Yes       [] No  Labs ordered     [] Yes       [x] No  Imaging ordered    [] Yes      [x] No    Orders Placed This Encounter   Procedures    Change dressing     Neox sutured in   Adaptic, steri strips Aquacel extra  Custom pad with hole to allow drainage to excape  Drawtex stacked Mextra Double foam over wound   Three cast padding football and Tubigrip medium gradient size E.  CAM  boot  Leave adaptic and steristrips in place during nurse visit on Monday change outer dsg only including custom pad        Follow up in about 1 week (around 4/28/2023) for nurse visit monday.

## 2023-04-25 ENCOUNTER — HOSPITAL ENCOUNTER (OUTPATIENT)
Dept: WOUND CARE | Facility: HOSPITAL | Age: 53
Discharge: HOME | End: 2023-04-25
Attending: FAMILY MEDICINE
Payer: MEDICARE

## 2023-04-25 VITALS — DIASTOLIC BLOOD PRESSURE: 80 MMHG | HEART RATE: 92 BPM | TEMPERATURE: 98 F | SYSTOLIC BLOOD PRESSURE: 149 MMHG

## 2023-04-25 DIAGNOSIS — E11.621 DIABETIC ULCER OF LEFT MIDFOOT ASSOCIATED WITH TYPE 2 DIABETES MELLITUS, WITH BONE INVOLVEMENT WITHOUT EVIDENCE OF NECROSIS: Primary | ICD-10-CM

## 2023-04-25 DIAGNOSIS — L97.426 DIABETIC ULCER OF LEFT MIDFOOT ASSOCIATED WITH TYPE 2 DIABETES MELLITUS, WITH BONE INVOLVEMENT WITHOUT EVIDENCE OF NECROSIS: Primary | ICD-10-CM

## 2023-04-25 NOTE — PROGRESS NOTES
[facility]      Subjective:     Patient seen in clinic today.  Dressing changed as ordered.   Nurse visit ordered on 04/21/23 for this visit.      Assessment:            ICD-10-CM ICD-9-CM   1. Diabetic ulcer of left midfoot associated with type 2 diabetes mellitus, with bone involvement without evidence of necrosis  E11.621 250.80    L97.426 707.14         Plan:   [unfilled]        No orders of the defined types were placed in this encounter.   [facility]                Progress Note    Subjective:       Patient ID: Indio Ryder Jr. is a 52 y.o. male.    Chief Complaint: Dressing Change    HPI    Review of Systems      Objective:        Physical Exam    Vitals:    04/25/23 1033   BP: (!) 149/80   Pulse: 92   Temp: 97.7 °F (36.5 °C)       Assessment:           ICD-10-CM ICD-9-CM   1. Diabetic ulcer of left midfoot associated with type 2 diabetes mellitus, with bone involvement without evidence of necrosis  E11.621 250.80    L97.426 707.14                Plan:              Tissue pathology and/or culture taken     [] Yes      [] No  Sharp debridement performed                   [] Yes       [] No  Labs ordered     [] Yes       [] No  Imaging ordered    [] Yes      [] No    No orders of the defined types were placed in this encounter.       No follow-ups on file.

## 2023-04-28 ENCOUNTER — HOSPITAL ENCOUNTER (OUTPATIENT)
Dept: WOUND CARE | Facility: HOSPITAL | Age: 53
Discharge: HOME | End: 2023-04-28
Attending: PODIATRIST
Payer: MEDICARE

## 2023-04-28 VITALS — DIASTOLIC BLOOD PRESSURE: 79 MMHG | SYSTOLIC BLOOD PRESSURE: 144 MMHG | HEART RATE: 94 BPM | TEMPERATURE: 98 F

## 2023-04-28 DIAGNOSIS — L97.426 DIABETIC ULCER OF LEFT MIDFOOT ASSOCIATED WITH TYPE 2 DIABETES MELLITUS, WITH BONE INVOLVEMENT WITHOUT EVIDENCE OF NECROSIS: Primary | ICD-10-CM

## 2023-04-28 DIAGNOSIS — E11.621 DIABETIC ULCER OF LEFT MIDFOOT ASSOCIATED WITH TYPE 2 DIABETES MELLITUS, WITH BONE INVOLVEMENT WITHOUT EVIDENCE OF NECROSIS: Primary | ICD-10-CM

## 2023-04-28 DIAGNOSIS — M86.671 CHRONIC OSTEOMYELITIS OF RIGHT FOOT: ICD-10-CM

## 2023-04-28 PROCEDURE — 99213 PR OFFICE/OUTPT VISIT, EST, LEVL III, 20-29 MIN: ICD-10-PCS | Mod: ,,, | Performed by: PODIATRIST

## 2023-04-28 PROCEDURE — 99213 OFFICE O/P EST LOW 20 MIN: CPT | Performed by: PODIATRIST

## 2023-04-28 PROCEDURE — 99213 OFFICE O/P EST LOW 20 MIN: CPT | Mod: ,,, | Performed by: PODIATRIST

## 2023-04-28 NOTE — PROGRESS NOTES
[facility]                Progress Note    Subjective:       Patient ID: Indio Ryder Jr. is a 52 y.o. male.    Chief Complaint: Wound Check    Follow up wound care visit. Patient ambulated to exam room with own tennis shoe with lift on Right Foot and CAM boot on LLE. No c/o pain at present. Denies fever, chills or flu-like symptoms. Dressing intact with a small amount of serosanguineous, non-malodor drainage from Left Foot wound, suture still intact.     Review of Systems   Constitutional:  Negative for appetite change, chills, fatigue and fever.   HENT:  Negative for hearing loss.    Eyes:  Negative for photophobia and visual disturbance.   Respiratory:  Negative for cough, chest tightness, shortness of breath and wheezing.    Cardiovascular:  Positive for leg swelling. Negative for chest pain and palpitations.   Gastrointestinal:  Negative for constipation, diarrhea, nausea and vomiting.   Endocrine: Negative for cold intolerance and heat intolerance.   Genitourinary:  Negative for flank pain.   Musculoskeletal:  Positive for gait problem and myalgias. Negative for neck pain and neck stiffness.   Skin:  Positive for wound. Negative for color change.   Neurological:  Positive for numbness. Negative for weakness, light-headedness and headaches.        + paresthesia    Psychiatric/Behavioral:  Negative for sleep disturbance.        Objective:        Physical Exam  Constitutional:       Appearance: He is well-developed.   Cardiovascular:      Comments: Dorsalis pedis and posterior tibial pulses are palpable Skin is atrophic, slightly hyperpigmented, and mildly edematous      Musculoskeletal:         General: No tenderness. Normal range of motion.      Comments: Muscle strength is 5/5 in all groups bilaterally.    S/p partial 5th ray amp b/l    S/p hallux amp right    Fat pad atrophy to heels and met heads bilateral       Skin:     General: Skin is warm and dry.       Coloration: Skin is not jaundiced, mottled or sallow.      Findings: Wound (see below) present. No abscess or ecchymosis.      Comments:        Neurological:      Mental Status: He is alert and oriented to person, place, and time.      Comments: Se[place]-Nenita 5.07 monofilamant testing is diminished Kenji feet. Sharp/dull sensation diminished Bilaterally. Light touch absent Bilaterally.       Psychiatric:         Behavior: Behavior normal.       Vitals:    04/28/23 1015   BP: (!) 144/79   Pulse: 94   Temp: 97.8 °F (36.6 °C)       Assessment:           ICD-10-CM ICD-9-CM   1. Diabetic ulcer of left midfoot associated with type 2 diabetes mellitus, with bone involvement without evidence of necrosis  E11.621 250.80    L97.426 707.14   2. Chronic osteomyelitis of right foot  M86.671 730.17            (Retired) Wound 04/08/22 1137 Diabetic Ulcer Left plantar;lateral Foot (Active)   04/08/22 1137    Pre-existing: Yes   Primary Wound Type: Diabetic ulc   Side: Left   Orientation: plantar;lateral   Location: Foot   Wound Number:    Ankle-Brachial Index:    Pulses:    Removal Indication and Assessment:    Wound Outcome:    (Retired) Wound Type:    (Retired) Wound Length (cm):    (Retired) Wound Width (cm):    (Retired) Depth (cm):    Wound Description (Comments):    Removal Indications:    Wound Image   04/28/23 1315   Wound WDL ex 04/28/23 1315   Dressing Appearance Intact;Moist drainage 04/28/23 1315   Drainage Amount Small 04/28/23 1315   Drainage Characteristics/Odor Serosanguineous;No odor 04/28/23 1315   Appearance Red;Sutures intact;Moist 04/28/23 1315   Tissue loss description Partial thickness 04/28/23 1315   Black (%), Wound Tissue Color 0 % 04/28/23 1315   Red (%), Wound Tissue Color 100 % 04/28/23 1315   Yellow (%), Wound Tissue Color 0 % 04/28/23 1315   Periwound Area Pale white 04/28/23 1315   Wound Edges Callused;Defined;Open 04/28/23 1315   Wound Length (cm) 1 cm 04/28/23 1315   Wound Width (cm) 0.5 cm  04/28/23 1315   Wound Depth (cm) 0.2 cm 04/28/23 1315   Wound Volume (cm^3) 0.1 cm^3 04/28/23 1315   Wound Surface Area (cm^2) 0.5 cm^2 04/28/23 1315   Tunneling (depth (cm)/location) 0 04/28/23 1315   Undermining (depth (cm)/location) 0 04/28/23 1315   Care Cleansed with:;Antimicrobial agent;Sterile normal saline 04/28/23 1315   Dressing Changed 04/28/23 1315   Periwound Care Moisture barrier applied;Skin barrier film applied 04/28/23 1315   Compression Tubular elasticized bandage 04/28/23 1315   Off Loading Football dressing;Off loading shoe 04/28/23 1315   Dressing Change Due 05/03/23 04/28/23 1315           Plan:                Education about the prevention of limb loss.    Discussed wound healing cycle, skin integrity, ways to care for skin.Counseled patient on the effects of biomechanical pressure, PVD and blood glucose on healing. He verbalizes understanding that it can increase the chances of delayed healing and this prolonged exposure leads to infection or progression of infection which subsequently can result in loss of limb.    Adequate vitamin supplementation, protein intake, and hydration - discussed with patient    The wound is cleansed of foreign material as much as possible and the base inspected for bone or abscess.      Graft intact.  Reapply next week    Follow-up: next week but should call [facility] immediately if any signs of infection, such as fever, chills, sweats, increased redness or pain.    Short-term goals include maintaining good offloading and minimizing bioburden, promoting granulation and epithelialization to healing.  Long-term goals include keeping the wound healed by good offloading and medical management under the direction of internist.    Shoe inspection. Diabetic Foot Education. Patient reminded of the importance of good nutrition and blood sugar control to help prevent podiatric complications of diabetes. Patient instructed on proper foot hygeine. We discussed wearing proper  "shoe gear, daily foot inspections, never walking without protective shoe gear, never putting sharp instruments to feet.          Tissue pathology and/or culture taken     [] Yes      [x] No  Sharp debridement performed                   [] Yes       [x] No  Labs ordered     [] Yes       [x] No  Imaging ordered    [] Yes      [x] No    Orders Placed This Encounter   Procedures    Change dressing     Wound Dressing Orders    Dressing change frequency twice a week  Remove old dressing  Cleanse or irrigate with: Normal Saline, pat dry  Protect periwound with:  Cavilon / Benzoin (Gentian Violet to periwound if macerated)  Primary dressing: WOUND BASE: Skin sub still in place. Cutimed secured with Steri-Strips then Aquacel Extra (cut to size)  Secondary dressing: Custom foot pad with hole to allow for drainage. Drawtex (size 2" x 2", cut to size and 2 layers stacked), Mextra (5" x 5"), Abram foam (Long x 2, 4 " x 8" laid perpendicular over wound bed)  Off-load: Football (cast padding x 3 rolls), CAM boot on LLE  BLE Compression: Tubi-, single layer, size E (LLE: lay Tubigrip over distal foot in sock like fashion)    Nurse Visit: Leave Cutimed and Steri-strips in place. Change outer dressing including custom pad to LLE.        Follow up in about 12 days (around 5/10/2023) for wound care.       "

## 2023-05-03 ENCOUNTER — HOSPITAL ENCOUNTER (OUTPATIENT)
Dept: WOUND CARE | Facility: HOSPITAL | Age: 53
Discharge: HOME | End: 2023-05-03
Payer: MEDICARE

## 2023-05-03 VITALS — HEART RATE: 89 BPM | TEMPERATURE: 98 F | DIASTOLIC BLOOD PRESSURE: 70 MMHG | SYSTOLIC BLOOD PRESSURE: 154 MMHG

## 2023-05-03 DIAGNOSIS — E11.621 DIABETIC ULCER OF LEFT MIDFOOT ASSOCIATED WITH TYPE 2 DIABETES MELLITUS, WITH BONE INVOLVEMENT WITHOUT EVIDENCE OF NECROSIS: Primary | ICD-10-CM

## 2023-05-03 DIAGNOSIS — L97.426 DIABETIC ULCER OF LEFT MIDFOOT ASSOCIATED WITH TYPE 2 DIABETES MELLITUS, WITH BONE INVOLVEMENT WITHOUT EVIDENCE OF NECROSIS: Primary | ICD-10-CM

## 2023-05-03 PROCEDURE — 99212 OFFICE O/P EST SF 10 MIN: CPT

## 2023-05-03 NOTE — PROGRESS NOTES
"[facility]  [address]  [address]  Nurse Visit    Subjective:       Patient seen in clinic today.  Patient ambulated to exam room unaided with prescribed CAM boot on LLE and tennis with lift on Right Foot. No c/o pain at present. Dressing in intact with a moderate amount of serosanguineous drainage, no strike through noted. Dressing changed as ordered.      Assessment:          (Retired) Wound 04/08/22 1137 Diabetic Ulcer Left plantar;lateral Foot (Active)   04/08/22 1137    Pre-existing: Yes   Primary Wound Type: Diabetic ulc   Side: Left   Orientation: plantar;lateral   Location: Foot   Wound Number:    Ankle-Brachial Index:    Pulses:    Removal Indication and Assessment:    Wound Outcome:    (Retired) Wound Type:    (Retired) Wound Length (cm):    (Retired) Wound Width (cm):    (Retired) Depth (cm):    Wound Description (Comments):    Removal Indications:    Wound WDL ex 05/03/23 1543   Dressing Appearance Intact;Moist drainage 05/03/23 1543   Drainage Amount Moderate 05/03/23 1543   Drainage Characteristics/Odor Serosanguineous;No odor 05/03/23 1543   Appearance Sutures intact 05/03/23 1543   Care Cleansed with:;Antimicrobial agent;Sterile normal saline 05/03/23 1543   Dressing Changed 05/03/23 1543   Periwound Care Skin barrier film applied 05/03/23 1543   Compression Tubular elasticized bandage 05/03/23 1543   Off Loading Football dressing;Off loading shoe 05/03/23 1543   Dressing Change Due 05/05/23 05/03/23 1543           Plan:     Wound Dressing Orders     Dressing change frequency twice a week   Remove old dressing   Cleanse or irrigate with: Normal Saline, pat dry   Protect periwound with:  Cavilon / Benzoin (Gentian Violet to periwound if macerated)   Primary dressing: WOUND BASE: Skin sub still in place. Cutimed secured with Steri-Strips then Aquacel Extra (cut to size)   Secondary dressing: Custom foot pad with hole to allow for drainage. Drawtex (size 2" x " "2", 2 layers stacked), Mextra (5" x 5"), Abram foam (Long x 2, 4 " x 8" laid perpendicular over wound bed)   Off-load: Football (cast padding x 3 rolls), CAM boot on LLE   BLE Compression: Tubi-, single layer, size E (LLE: lay Tubigrip over distal foot in sock like fashion)           Follow up in about 2 days (around 5/5/2023) for wound care.        "

## 2023-05-05 ENCOUNTER — HOSPITAL ENCOUNTER (OUTPATIENT)
Dept: WOUND CARE | Facility: HOSPITAL | Age: 53
Discharge: HOME | End: 2023-05-05
Attending: PODIATRIST
Payer: MEDICARE

## 2023-05-05 VITALS — SYSTOLIC BLOOD PRESSURE: 126 MMHG | DIASTOLIC BLOOD PRESSURE: 84 MMHG | HEART RATE: 87 BPM | TEMPERATURE: 98 F

## 2023-05-05 DIAGNOSIS — L97.426 DIABETIC ULCER OF LEFT MIDFOOT ASSOCIATED WITH TYPE 2 DIABETES MELLITUS, WITH BONE INVOLVEMENT WITHOUT EVIDENCE OF NECROSIS: Primary | ICD-10-CM

## 2023-05-05 DIAGNOSIS — E11.621 DIABETIC ULCER OF LEFT MIDFOOT ASSOCIATED WITH TYPE 2 DIABETES MELLITUS, WITH BONE INVOLVEMENT WITHOUT EVIDENCE OF NECROSIS: Primary | ICD-10-CM

## 2023-05-05 PROCEDURE — 11042 DBRDMT SUBQ TIS 1ST 20SQCM/<: CPT | Performed by: PODIATRIST

## 2023-05-05 PROCEDURE — 99214 PR OFFICE/OUTPT VISIT, EST, LEVL IV, 30-39 MIN: ICD-10-PCS | Mod: ,,, | Performed by: PODIATRIST

## 2023-05-05 PROCEDURE — 99214 OFFICE O/P EST MOD 30 MIN: CPT | Mod: ,,, | Performed by: PODIATRIST

## 2023-05-05 NOTE — PROGRESS NOTES
[facility]                Progress Note    Subjective:       Patient ID: Indio Ryder Jr. is a 52 y.o. male.    Chief Complaint: Wound Check    Follow up wound care visit. Patient ambulated to exam room with own tennis shoe with lift on Right Foot and CAM boot on LLE. No c/o pain at present. Denies fever, chills or flu-like symptoms. Dressing to Left Foot is intact with a moderate amount of serosanguineous, non-malodor drainage from Left Foot wound, suture still intact.     Review of Systems   Constitutional:  Negative for appetite change, chills, fatigue and fever.   HENT:  Negative for hearing loss.    Eyes:  Negative for photophobia and visual disturbance.   Respiratory:  Negative for cough, chest tightness, shortness of breath and wheezing.    Cardiovascular:  Positive for leg swelling. Negative for chest pain and palpitations.   Gastrointestinal:  Negative for constipation, diarrhea, nausea and vomiting.   Endocrine: Negative for cold intolerance and heat intolerance.   Genitourinary:  Negative for flank pain.   Musculoskeletal:  Positive for gait problem and myalgias. Negative for neck pain and neck stiffness.   Skin:  Positive for wound. Negative for color change.   Neurological:  Positive for numbness. Negative for weakness, light-headedness and headaches.        + paresthesia    Psychiatric/Behavioral:  Negative for sleep disturbance.        Objective:        Physical Exam  Constitutional:       Appearance: He is well-developed.   Cardiovascular:      Comments: Dorsalis pedis and posterior tibial pulses are palpable Skin is atrophic, slightly hyperpigmented, and mildly edematous      Musculoskeletal:         General: No tenderness. Normal range of motion.      Comments: Muscle strength is 5/5 in all groups bilaterally.    S/p partial 5th ray amp b/l    S/p hallux amp right    Fat pad atrophy to heels and met heads bilateral       Skin:     General: Skin is  warm and dry.      Coloration: Skin is not jaundiced, mottled or sallow.      Findings: Wound (see below) present. No abscess or ecchymosis.      Comments:        Neurological:      Mental Status: He is alert and oriented to person, place, and time.      Comments: [place]-Nenita 5.07 monofilamant testing is diminished Kenji feet. Sharp/dull sensation diminished Bilaterally. Light touch absent Bilaterally.       Psychiatric:         Behavior: Behavior normal.       Vitals:    05/05/23 1110   BP: 126/84   Pulse: 87   Temp: 98.3 °F (36.8 °C)       Assessment:           ICD-10-CM ICD-9-CM   1. Diabetic ulcer of left midfoot associated with type 2 diabetes mellitus, with bone involvement without evidence of necrosis  E11.621 250.80    L97.426 707.14            Altered Skin Integrity 05/05/23 1108 Left distal;lateral;plantar Foot Blister(s) (Active)   05/05/23 1108   Altered Skin Integrity Present on Admission - Did Patient arrive to the hospital with altered skin?: yes   Side: Left   Orientation: distal;lateral;plantar   Location: Foot   Wound Number:    Is this injury device related?: No   Primary Wound Type: Blister(s)   Description of Altered Skin Integrity:    Ankle-Brachial Index:    Pulses:    Removal Indication and Assessment:    Wound Outcome:    (Retired) Wound Length (cm):    (Retired) Wound Width (cm):    (Retired) Depth (cm):    Wound Description (Comments):    Removal Indications:    Wound Image   05/05/23 1412   Description of Altered Skin Integrity Partial thickness tissue loss. Shallow open ulcer with a red or pink wound bed, without slough. Intact or Open/Ruptured Serum-filled blister. 05/05/23 1412   Dressing Appearance Intact;Dry 05/05/23 1412   Drainage Amount None 05/05/23 1412   Appearance Blistered;Pink 05/05/23 1412   Tissue loss description Partial thickness 05/05/23 1412   Black (%), Wound Tissue Color 0 % 05/05/23 1412   Red (%), Wound Tissue Color 100 % 05/05/23 1412   Yellow (%), Wound Tissue  Color 0 % 05/05/23 1412   Periwound Area Pale white 05/05/23 1412   Wound Edges Defined 05/05/23 1412   Wound Length (cm) 0.5 cm 05/05/23 1412   Wound Width (cm) 0.9 cm 05/05/23 1412   Wound Depth (cm) 0.1 cm 05/05/23 1412   Wound Volume (cm^3) 0.045 cm^3 05/05/23 1412   Wound Surface Area (cm^2) 0.45 cm^2 05/05/23 1412   Tunneling (depth (cm)/location) 0 05/05/23 1412   Undermining (depth (cm)/location) 0 05/05/23 1412   Care Cleansed with:;Antimicrobial agent;Sterile normal saline;Wound cleanser 05/05/23 1412   Dressing Changed 05/05/23 1412   Periwound Care Moisture barrier applied 05/05/23 1412   Compression Tubular elasticized bandage 05/05/23 1412   Off Loading Football dressing;Off loading shoe 05/05/23 1412   Dressing Change Due 05/12/23 05/05/23 1412            (Retired) Wound 04/08/22 1137 Diabetic Ulcer Left plantar;lateral Foot (Active)   04/08/22 1137    Pre-existing: Yes   Primary Wound Type: Diabetic ulc   Side: Left   Orientation: plantar;lateral   Location: Foot   Wound Number:    Ankle-Brachial Index:    Pulses:    Removal Indication and Assessment:    Wound Outcome:    (Retired) Wound Type:    (Retired) Wound Length (cm):    (Retired) Wound Width (cm):    (Retired) Depth (cm):    Wound Description (Comments):    Removal Indications:    Wound Image   05/05/23 1412   Wound WDL ex 05/05/23 1412   Dressing Appearance Intact;Moist drainage 05/05/23 1412   Drainage Amount Moderate 05/05/23 1412   Drainage Characteristics/Odor Serosanguineous;No odor 05/05/23 1412   Appearance Red;Sutures intact 05/05/23 1412   Tissue loss description Partial thickness 05/05/23 1412   Black (%), Wound Tissue Color 0 % 05/05/23 1412   Red (%), Wound Tissue Color 100 % 05/05/23 1412   Yellow (%), Wound Tissue Color 0 % 05/05/23 1412   Periwound Area Moist;Pale white 05/05/23 1412   Wound Edges Defined;Open 05/05/23 1412   Wound Length (cm) 1 cm 05/05/23 1412   Wound Width (cm) 0.5 cm 05/05/23 1412   Wound Depth (cm) 0.2 cm  05/05/23 1412   Wound Volume (cm^3) 0.1 cm^3 05/05/23 1412   Wound Surface Area (cm^2) 0.5 cm^2 05/05/23 1412   Tunneling (depth (cm)/location) 0 05/05/23 1412   Undermining (depth (cm)/location) 0 05/05/23 1412   Care Cleansed with:;Antimicrobial agent;Sterile normal saline;Wound cleanser;Sutures removed 05/05/23 1412   Dressing Changed 05/05/23 1412   Periwound Care Moisture barrier applied 05/05/23 1412   Compression Tubular elasticized bandage 05/05/23 1412   Off Loading Football dressing;Off loading shoe 05/05/23 1412   Dressing Change Due 05/12/23 05/05/23 1412           Plan:                Education about the prevention of limb loss.    Discussed wound healing cycle, skin integrity, ways to care for skin.Counseled patient on the effects of biomechanical pressure, PVD and blood glucose on healing. He verbalizes understanding that it can increase the chances of delayed healing and this prolonged exposure leads to infection or progression of infection which subsequently can result in loss of limb.    Adequate vitamin supplementation, protein intake, and hydration - discussed with patient    Sutures removed    The wound is cleansed of foreign material as much as possible and the base inspected for bone or abscess.      Bulla noted to distal medial aspect of Left Plantar Foot wound.     Wound Care done as per order. Return to clinic on Tuesday for Nurse Visit and MD visit in 1 week. Applied new Darco shoe with PegAssist insert on Left Foot.    Short-term goals include maintaining good offloading and minimizing bioburden, promoting granulation and epithelialization to healing.  Long-term goals include keeping the wound healed by good offloading and medical management under the direction of internist.    Shoe inspection. Diabetic Foot Education. Patient reminded of the importance of good nutrition and blood sugar control to help prevent podiatric complications of diabetes. Patient instructed on proper foot hygeine.  "We discussed wearing proper shoe gear, daily foot inspections, never walking without protective shoe gear, never putting sharp instruments to feet.          Tissue pathology and/or culture taken     [] Yes      [x] No  Sharp debridement performed                   [] Yes       [x] No  Labs ordered     [] Yes       [x] No  Imaging ordered    [] Yes      [x] No    Orders Placed This Encounter   Procedures    Change dressing     Wound Dressing Orders    Dressing change frequency twice a week  Remove old dressing  Cleanse or irrigate with: Normal Saline then Vashe soak for 10 minutes (MD removed sutures)  Protect periwound with:  Gentian Violet   Primary dressing: WOUND BASE: Endoform then Iodoflex   Secondary dressing: Drawtex: size used: 2" x 2" (5 cm x 5 cm) double layer stacked and Mextra: size used: 5" x 5". Abram foam Short x 1 then Long x 2 (laid perpendicular over wound beds)  Compression: Tubi-, single layer, size E (applied to BLE)  Off-load: Football (cast padding x 3 rolls) then Tubigrip, single layer, size E (LLE: lay Tubigrip over distal foot in sock like fashion then secured with Medipore tape secure to plantar aspect). Darco shoe with PegAssist on Left foot      Change LLE at Nurse Visit        Follow up in about 1 week (around 5/12/2023) for wound care.       "

## 2023-05-09 ENCOUNTER — HOSPITAL ENCOUNTER (OUTPATIENT)
Dept: WOUND CARE | Facility: HOSPITAL | Age: 53
Discharge: HOME | End: 2023-05-09
Attending: FAMILY MEDICINE
Payer: MEDICARE

## 2023-05-09 VITALS — DIASTOLIC BLOOD PRESSURE: 70 MMHG | TEMPERATURE: 46 F | SYSTOLIC BLOOD PRESSURE: 144 MMHG | HEART RATE: 68 BPM

## 2023-05-09 DIAGNOSIS — L97.426 DIABETIC ULCER OF LEFT MIDFOOT ASSOCIATED WITH TYPE 2 DIABETES MELLITUS, WITH BONE INVOLVEMENT WITHOUT EVIDENCE OF NECROSIS: Primary | ICD-10-CM

## 2023-05-09 DIAGNOSIS — M86.671 CHRONIC OSTEOMYELITIS OF RIGHT FOOT: ICD-10-CM

## 2023-05-09 DIAGNOSIS — E11.621 DIABETIC ULCER OF LEFT MIDFOOT ASSOCIATED WITH TYPE 2 DIABETES MELLITUS, WITH BONE INVOLVEMENT WITHOUT EVIDENCE OF NECROSIS: Primary | ICD-10-CM

## 2023-05-09 PROCEDURE — 99212 OFFICE O/P EST SF 10 MIN: CPT

## 2023-05-09 NOTE — PROGRESS NOTES
"[facility]  [address]   [place]  [address]  Nurse Visit    Subjective:       Patient seen in clinic today. Patient ambulated to exam room unaided with prescribed Darco shoe on. He reports Darco shoe with PegAssist "caused me to walk side ways". He requested a new CAM boot, given to patient, but advised to wear Darco shoe with regular insert around house if able. Dressing is intact with a small amount of serous, yellow, non-malodor drainage. Dressing changed as ordered.      Assessment:          Altered Skin Integrity 05/05/23 1108 Left distal;lateral;plantar Foot Blister(s) (Active)   05/05/23 1108   Altered Skin Integrity Present on Admission - Did Patient arrive to the hospital with altered skin?: yes   Side: Left   Orientation: distal;lateral;plantar   Location: Foot   Wound Number:    Is this injury device related?: No   Primary Wound Type: Blister(s)   Description of Altered Skin Integrity:    Ankle-Brachial Index:    Pulses:    Removal Indication and Assessment:    Wound Outcome:    (Retired) Wound Length (cm):    (Retired) Wound Width (cm):    (Retired) Depth (cm):    Wound Description (Comments):    Removal Indications:    Description of Altered Skin Integrity Partial thickness tissue loss. Shallow open ulcer with a red or pink wound bed, without slough. Intact or Open/Ruptured Serum-filled blister. 05/09/23 1435   Dressing Appearance Intact;Moist drainage 05/09/23 1435   Drainage Amount Small 05/09/23 1435   Drainage Characteristics/Odor Serous;Yellow;No odor 05/09/23 1435   Appearance Red;Moist 05/09/23 1435   Tissue loss description Partial thickness 05/09/23 1435   Black (%), Wound Tissue Color 0 % 05/09/23 1435   Red (%), Wound Tissue Color 100 % 05/09/23 1435   Yellow (%), Wound Tissue Color 0 % 05/09/23 1435   Periwound Area Macerated;Moist;Pale white 05/09/23 1435   Wound Edges Defined;Open 05/09/23 1435   Care Cleansed with:;Antimicrobial agent;Sterile normal " saline;Wound cleanser 05/09/23 1435   Dressing Changed 05/09/23 1435   Periwound Care Moisture barrier applied 05/09/23 1435   Compression Tubular elasticized bandage 05/09/23 1435   Off Loading Football dressing;Off loading shoe 05/09/23 1435   Dressing Change Due 05/12/23 05/09/23 1435            (Retired) Wound 04/08/22 1137 Diabetic Ulcer Left plantar;lateral Foot (Active)   04/08/22 1137    Pre-existing: Yes   Primary Wound Type: Diabetic ulc   Side: Left   Orientation: plantar;lateral   Location: Foot   Wound Number:    Ankle-Brachial Index:    Pulses:    Removal Indication and Assessment:    Wound Outcome:    (Retired) Wound Type:    (Retired) Wound Length (cm):    (Retired) Wound Width (cm):    (Retired) Depth (cm):    Wound Description (Comments):    Removal Indications:    Wound WDL ex 05/09/23 1435   Dressing Appearance Intact;Moist drainage 05/09/23 1435   Drainage Amount Small 05/09/23 1435   Drainage Characteristics/Odor Serous;Yellow;No odor 05/09/23 1435   Appearance Red;Moist 05/09/23 1435   Tissue loss description Partial thickness 05/09/23 1435   Black (%), Wound Tissue Color 0 % 05/09/23 1435   Red (%), Wound Tissue Color 100 % 05/09/23 1435   Yellow (%), Wound Tissue Color 0 % 05/09/23 1435   Periwound Area Moist;Pale white 05/09/23 1435   Wound Edges Defined;Open 05/09/23 1435   Care Cleansed with:;Antimicrobial agent;Sterile normal saline;Wound cleanser 05/09/23 1435   Dressing Changed 05/09/23 1435   Periwound Care Moisture barrier applied 05/09/23 1435   Compression Tubular elasticized bandage 05/09/23 1435   Off Loading Football dressing;Off loading shoe 05/09/23 1435   Dressing Change Due 05/12/23 05/09/23 1435           Plan:     Wound Dressing Orders     Dressing change frequency twice a week   Remove old dressing   Cleanse or irrigate with: Normal Saline then Vashe soak for 10 minutes (MD removed sutures)   Protect periwound with:  Gentian Violet   Primary dressing: WOUND BASE: Endoform  "then Iodoflex   Secondary dressing: Drawtex: size used: 2" x 2" (5 cm x 5 cm) double layer stacked and Mextra: size used: 5" x 5". Abram foam Short x 1 then Long x 2 (laid perpendicular over wound beds)   Compression: Tubi-, single layer, size E (applied to BLE)   Off-load: Football (cast padding x 3 rolls) then Tubigrip, single layer, size E (LLE: lay Tubigrip over distal foot in sock like fashion then secured with Medipore tape secure to plantar aspect). CAM boot.          Follow up in about 3 days (around 5/12/2023) for wound care.        "

## 2023-05-12 ENCOUNTER — HOSPITAL ENCOUNTER (OUTPATIENT)
Dept: WOUND CARE | Facility: HOSPITAL | Age: 53
Discharge: HOME | End: 2023-05-12
Attending: PODIATRIST
Payer: MEDICARE

## 2023-05-12 VITALS — DIASTOLIC BLOOD PRESSURE: 100 MMHG | HEART RATE: 100 BPM | TEMPERATURE: 98 F | SYSTOLIC BLOOD PRESSURE: 175 MMHG

## 2023-05-12 DIAGNOSIS — L84 PRE-ULCERATIVE CALLUSES: ICD-10-CM

## 2023-05-12 DIAGNOSIS — L97.426 DIABETIC ULCER OF LEFT MIDFOOT ASSOCIATED WITH TYPE 2 DIABETES MELLITUS, WITH BONE INVOLVEMENT WITHOUT EVIDENCE OF NECROSIS: Primary | ICD-10-CM

## 2023-05-12 DIAGNOSIS — E11.621 DIABETIC ULCER OF LEFT MIDFOOT ASSOCIATED WITH TYPE 2 DIABETES MELLITUS, WITH BONE INVOLVEMENT WITHOUT EVIDENCE OF NECROSIS: Primary | ICD-10-CM

## 2023-05-12 LAB
GRAM STN SPEC: NORMAL
GRAM STN SPEC: NORMAL

## 2023-05-12 PROCEDURE — 87077 CULTURE AEROBIC IDENTIFY: CPT | Mod: 59 | Performed by: PODIATRIST

## 2023-05-12 PROCEDURE — 87075 CULTR BACTERIA EXCEPT BLOOD: CPT | Performed by: PODIATRIST

## 2023-05-12 PROCEDURE — 87186 SC STD MICRODIL/AGAR DIL: CPT | Performed by: PODIATRIST

## 2023-05-12 PROCEDURE — 15271 SKIN SUB GRAFT TRNK/ARM/LEG: CPT | Performed by: PODIATRIST

## 2023-05-12 PROCEDURE — 87205 SMEAR GRAM STAIN: CPT | Performed by: PODIATRIST

## 2023-05-12 PROCEDURE — 15275 SKIN SUB GRAFT FACE/NK/HF/G: CPT | Mod: ,,, | Performed by: PODIATRIST

## 2023-05-12 PROCEDURE — 99499 UNLISTED E&M SERVICE: CPT | Mod: ,,, | Performed by: PODIATRIST

## 2023-05-12 PROCEDURE — 15275 SKIN SUB GRAFT FACE/NK/HF/G: CPT | Performed by: PODIATRIST

## 2023-05-12 PROCEDURE — 15275 SKIN SUBSTITUTE: ICD-10-PCS | Mod: ,,, | Performed by: PODIATRIST

## 2023-05-12 PROCEDURE — 87070 CULTURE OTHR SPECIMN AEROBIC: CPT | Performed by: PODIATRIST

## 2023-05-12 PROCEDURE — 99499 NO LOS: ICD-10-PCS | Mod: ,,, | Performed by: PODIATRIST

## 2023-05-12 NOTE — PROGRESS NOTES
[facility]                Progress Note    Subjective:       Patient ID: Indio Ryder Jr. is a 52 y.o. male.    Chief Complaint: Wound Care    Follow up wound care visit. Patient ambulated to exam room with own tennis shoe with lift on Right Foot and CAM boot on LLE. No c/o pain at present. Denies fever, chills or flu-like symptoms. Dressing to Left Foot is intact with a small amount of serosanguineous, non-malodor drainage from Left Foot wound.     Review of Systems   Constitutional:  Negative for appetite change, chills, fatigue and fever.   HENT:  Negative for hearing loss.    Eyes:  Negative for photophobia and visual disturbance.   Respiratory:  Negative for cough, chest tightness, shortness of breath and wheezing.    Cardiovascular:  Positive for leg swelling. Negative for chest pain and palpitations.   Gastrointestinal:  Negative for constipation, diarrhea, nausea and vomiting.   Endocrine: Negative for cold intolerance and heat intolerance.   Genitourinary:  Negative for flank pain.   Musculoskeletal:  Positive for gait problem and myalgias. Negative for neck pain and neck stiffness.   Skin:  Positive for wound. Negative for color change.   Neurological:  Positive for numbness. Negative for weakness, light-headedness and headaches.        + paresthesia    Psychiatric/Behavioral:  Negative for sleep disturbance.        Objective:        Physical Exam  Constitutional:       Appearance: He is well-developed.   Cardiovascular:      Comments: Dorsalis pedis and posterior tibial pulses are palpable Skin is atrophic, slightly hyperpigmented, and mildly edematous      Musculoskeletal:         General: No tenderness. Normal range of motion.      Comments: Muscle strength is 5/5 in all groups bilaterally.    S/p partial 5th ray amp b/l    S/p hallux amp right    Fat pad atrophy to heels and met heads bilateral       Skin:     General: Skin is warm and dry.       Coloration: Skin is not jaundiced, mottled or sallow.      Findings: Wound (see below) present. No abscess or ecchymosis.      Comments:        Neurological:      Mental Status: He is alert and oriented to person, place, and time.      Comments: [place] 5.07 monofilamant testing is diminished Kenji feet. Sharp/dull sensation diminished Bilaterally. Light touch absent Bilaterally.       Psychiatric:         Behavior: Behavior normal.       Vitals:    05/12/23 0918   BP: (!) 175/100   Pulse: 100   Temp: 97.5 °F (36.4 °C)       Assessment:           ICD-10-CM ICD-9-CM   1. Diabetic ulcer of left midfoot associated with type 2 diabetes mellitus, with bone involvement without evidence of necrosis  E11.621 250.80    L97.426 707.14   2. Pre-ulcerative calluses  L84 700            Altered Skin Integrity 05/05/23 1108 Left distal;lateral;plantar Foot Blister(s) (Active)   05/05/23 1108   Altered Skin Integrity Present on Admission - Did Patient arrive to the hospital with altered skin?: yes   Side: Left   Orientation: distal;lateral;plantar   Location: Foot   Wound Number:    Is this injury device related?: No   Primary Wound Type: Blister(s)   Description of Altered Skin Integrity:    Ankle-Brachial Index:    Pulses:    Removal Indication and Assessment:    Wound Outcome:    (Retired) Wound Length (cm):    (Retired) Wound Width (cm):    (Retired) Depth (cm):    Wound Description (Comments):    Removal Indications:    Wound Image   05/12/23 0936   Description of Altered Skin Integrity Partial thickness tissue loss. Shallow open ulcer with a red or pink wound bed, without slough. Intact or Open/Ruptured Serum-filled blister. 05/12/23 0936   Dressing Appearance Intact;Moist drainage 05/12/23 0936   Drainage Amount Small 05/12/23 0936   Drainage Characteristics/Odor Serosanguineous;No odor 05/12/23 0936   Appearance Pink;Red;Moist 05/12/23 0936   Tissue loss description Partial thickness 05/12/23 0936   Black (%), Wound  Tissue Color 0 % 05/12/23 0936   Red (%), Wound Tissue Color 100 % 05/12/23 0936   Yellow (%), Wound Tissue Color 0 % 05/12/23 0936   Periwound Area Macerated;Moist;Pale white 05/12/23 0936   Wound Edges Defined;Open 05/12/23 0936   Wound Length (cm) 0.5 cm 05/12/23 0936   Wound Width (cm) 1.1 cm 05/12/23 0936   Wound Depth (cm) 0.1 cm 05/12/23 0936   Wound Volume (cm^3) 0.055 cm^3 05/12/23 0936   Wound Surface Area (cm^2) 0.55 cm^2 05/12/23 0936   Tunneling (depth (cm)/location) 0 05/12/23 0936   Undermining (depth (cm)/location) 0 05/12/23 0936   Care Cleansed with:;Antimicrobial agent;Sterile normal saline;Wound cleanser 05/12/23 0936   Dressing Changed 05/12/23 0936   Periwound Care Moisture barrier applied 05/12/23 0936   Compression Tubular elasticized bandage 05/12/23 0936   Off Loading Football dressing;Off loading shoe 05/12/23 0936   Dressing Change Due 05/16/23 05/12/23 0936            (Retired) Wound 04/08/22 1137 Diabetic Ulcer Left plantar;lateral Foot (Active)   04/08/22 1137    Pre-existing: Yes   Primary Wound Type: Diabetic ulc   Side: Left   Orientation: plantar;lateral   Location: Foot   Wound Number:    Ankle-Brachial Index:    Pulses:    Removal Indication and Assessment:    Wound Outcome:    (Retired) Wound Type:    (Retired) Wound Length (cm):    (Retired) Wound Width (cm):    (Retired) Depth (cm):    Wound Description (Comments):    Removal Indications:    Wound Image   05/12/23 0936   Wound WDL ex 05/12/23 0936   Dressing Appearance Intact;Moist drainage 05/12/23 0936   Drainage Amount Small 05/12/23 0936   Drainage Characteristics/Odor Serosanguineous;No odor 05/12/23 0936   Appearance Red;Pink;Moist 05/12/23 0936   Tissue loss description Full thickness 05/12/23 0936   Black (%), Wound Tissue Color 0 % 05/12/23 0936   Red (%), Wound Tissue Color 100 % 05/12/23 0936   Yellow (%), Wound Tissue Color 0 % 05/12/23 0936   Periwound Area Macerated;Moist;Pale white 05/12/23 0936   Wound Edges  Defined;Open 05/12/23 0936   Wound Length (cm) 1.2 cm 05/12/23 0936   Wound Width (cm) 0.7 cm 05/12/23 0936   Wound Depth (cm) 0.3 cm 05/12/23 0936   Wound Volume (cm^3) 0.252 cm^3 05/12/23 0936   Wound Surface Area (cm^2) 0.84 cm^2 05/12/23 0936   Tunneling (depth (cm)/location) 0 05/12/23 0936   Undermining (depth (cm)/location) 0.3 cm circ 05/12/23 0936   Care Cleansed with:;Antimicrobial agent;Sterile normal saline;Wound cleanser 05/12/23 0936   Dressing Changed 05/12/23 0936   Periwound Care Moisture barrier applied 05/12/23 0936   Compression Tubular elasticized bandage 05/12/23 0936   Off Loading Football dressing;Off loading shoe 05/12/23 0936   Dressing Change Due 05/16/23 05/12/23 0936           Plan:                Education about the prevention of limb loss.    Discussed wound healing cycle, skin integrity, ways to care for skin.Counseled patient on the effects of biomechanical pressure, PVD and blood glucose on healing. He verbalizes understanding that it can increase the chances of delayed healing and this prolonged exposure leads to infection or progression of infection which subsequently can result in loss of limb.    Adequate vitamin supplementation, protein intake, and hydration - discussed with patient    The wound is cleansed of foreign material as much as possible and the base inspected for bone or abscess.      I am concerned about stagnation and potentia local infection.  Deep wound swabs taken     Wound Care done as per order. Return to clinic on Tuesday for Nurse Visit and MD visit in 1 week.     Short-term goals include maintaining good offloading and minimizing bioburden, promoting granulation and epithelialization to healing.  Long-term goals include keeping the wound healed by good offloading and medical management under the direction of internist.    Shoe inspection. Diabetic Foot Education. Patient reminded of the importance of good nutrition and blood sugar control to help prevent  podiatric complications of diabetes. Patient instructed on proper foot hygeine. We discussed wearing proper shoe gear, daily foot inspections, never walking without protective shoe gear, never putting sharp instruments to feet.      Skin substitute    Date/Time: 5/12/2023 9:00 AM  Performed by: Marivel Peterson DPM  Authorized by: Marivel Peterson DPM     Procedure details:     Location:  head/hands/feet/digits/genitalia    Product applied:  Neox 100 or clarix 100    Amount used (cm^2):  6    Amount wasted (cm^2):  0    Secured: Yes      Secured with:  Prolene  Dressing:     Dressing applied:  Adaptic dressing and Steri-Strips    Wrapped with:  Bulky dressing  Post-procedure details:     Patient tolerance of procedure:  Tolerated well, no immediate complications      Tissue pathology and/or culture taken     [] Yes      [x] No  Sharp debridement performed                   [] Yes       [x] No  Labs ordered     [] Yes       [x] No  Imaging ordered    [] Yes      [x] No    Orders Placed This Encounter   Procedures    Skin substitute     This order was created via procedure documentation     Standing Status:   Standing     Number of Occurrences:   1    Gram stain     Standing Status:   Standing     Number of Occurrences:   1    Change dressing     Clean with NS.   Left Foot: Iodine scrub for 2 minutes. Cavilon/Benzoin to periwound and plantar foot. Neox (sutured in), then Cutimed, secured with steristrips (per MD). Aquacel Extra sheet (bustled to wound bed). Custom Felt pad around wound bed (with large hole cut to allow for drainage). Mextra long x 1. Abram foam (long x2, laid perpendicular). Football dressing (cast padding x 3). Tubigrip single layer, size E folded over foot and secured with Medipore Tape. CAM boot.   Right Foot: Cavilon then U-pad around callus. Tubigrip single layer, size E.     Nurse Visit - Change Left Foot dressing only per MD. Skin Sub in place. Reapply Cutimed and steristrips if not adhered.  Aquacel Extra sheet (bustled to wound bed). Custom Felt pad around wound bed (with large hole cut to allow for drainage). Mextra long x 1. Abram foam (long x2, laid perpendicular). Football dressing (cast padding x 3). Tubigrip single layer, size E folded over foot and secured with Medipore Tape. CAM boot.        Follow up in about 4 days (around 5/16/2023) for wound care.

## 2023-05-14 DIAGNOSIS — E11.621 DIABETIC ULCER OF LEFT MIDFOOT ASSOCIATED WITH TYPE 2 DIABETES MELLITUS, WITH BONE INVOLVEMENT WITHOUT EVIDENCE OF NECROSIS: Primary | ICD-10-CM

## 2023-05-14 DIAGNOSIS — L97.426 DIABETIC ULCER OF LEFT MIDFOOT ASSOCIATED WITH TYPE 2 DIABETES MELLITUS, WITH BONE INVOLVEMENT WITHOUT EVIDENCE OF NECROSIS: Primary | ICD-10-CM

## 2023-05-14 LAB
BACTERIA SPEC AEROBE CULT: ABNORMAL
BACTERIA SPEC AEROBE CULT: ABNORMAL

## 2023-05-15 ENCOUNTER — TELEPHONE (OUTPATIENT)
Dept: WOUND CARE | Facility: HOSPITAL | Age: 53
End: 2023-05-15
Payer: MEDICARE

## 2023-05-15 NOTE — TELEPHONE ENCOUNTER
----- Message from Marivel Peterson DPM sent at 5/14/2023  3:35 PM CDT -----  Culture result positive for multiple bacteria.  Referral placed to infectious disease, please assist with scheduling

## 2023-05-16 ENCOUNTER — OFFICE VISIT (OUTPATIENT)
Dept: INFECTIOUS DISEASES | Facility: CLINIC | Age: 53
End: 2023-05-16
Payer: MEDICARE

## 2023-05-16 ENCOUNTER — HOSPITAL ENCOUNTER (OUTPATIENT)
Dept: WOUND CARE | Facility: HOSPITAL | Age: 53
Discharge: HOME | End: 2023-05-16
Attending: FAMILY MEDICINE
Payer: MEDICARE

## 2023-05-16 VITALS
WEIGHT: 232.13 LBS | HEIGHT: 73 IN | SYSTOLIC BLOOD PRESSURE: 111 MMHG | TEMPERATURE: 99 F | DIASTOLIC BLOOD PRESSURE: 73 MMHG | HEART RATE: 95 BPM | BODY MASS INDEX: 30.76 KG/M2

## 2023-05-16 VITALS — DIASTOLIC BLOOD PRESSURE: 62 MMHG | SYSTOLIC BLOOD PRESSURE: 124 MMHG | TEMPERATURE: 98 F | HEART RATE: 100 BPM

## 2023-05-16 DIAGNOSIS — E11.621 DIABETIC ULCER OF LEFT MIDFOOT ASSOCIATED WITH TYPE 2 DIABETES MELLITUS, WITH FAT LAYER EXPOSED: Primary | ICD-10-CM

## 2023-05-16 DIAGNOSIS — L97.422 DIABETIC ULCER OF LEFT MIDFOOT ASSOCIATED WITH TYPE 2 DIABETES MELLITUS, WITH FAT LAYER EXPOSED: Primary | ICD-10-CM

## 2023-05-16 DIAGNOSIS — L97.426 DIABETIC ULCER OF LEFT MIDFOOT ASSOCIATED WITH TYPE 2 DIABETES MELLITUS, WITH BONE INVOLVEMENT WITHOUT EVIDENCE OF NECROSIS: Primary | ICD-10-CM

## 2023-05-16 DIAGNOSIS — E11.621 DIABETIC ULCER OF LEFT MIDFOOT ASSOCIATED WITH TYPE 2 DIABETES MELLITUS, WITH BONE INVOLVEMENT WITHOUT EVIDENCE OF NECROSIS: Primary | ICD-10-CM

## 2023-05-16 LAB — BACTERIA SPEC ANAEROBE CULT: NORMAL

## 2023-05-16 PROCEDURE — 99213 OFFICE O/P EST LOW 20 MIN: CPT | Mod: PBBFAC,27 | Performed by: INTERNAL MEDICINE

## 2023-05-16 PROCEDURE — 99999 PR PBB SHADOW E&M-EST. PATIENT-LVL III: ICD-10-PCS | Mod: PBBFAC,,, | Performed by: INTERNAL MEDICINE

## 2023-05-16 PROCEDURE — 99999 PR PBB SHADOW E&M-EST. PATIENT-LVL III: CPT | Mod: PBBFAC,,, | Performed by: INTERNAL MEDICINE

## 2023-05-16 PROCEDURE — 99212 OFFICE O/P EST SF 10 MIN: CPT

## 2023-05-16 PROCEDURE — 99213 OFFICE O/P EST LOW 20 MIN: CPT | Mod: S$PBB,,, | Performed by: INTERNAL MEDICINE

## 2023-05-16 PROCEDURE — 99213 PR OFFICE/OUTPT VISIT, EST, LEVL III, 20-29 MIN: ICD-10-PCS | Mod: S$PBB,,, | Performed by: INTERNAL MEDICINE

## 2023-05-16 RX ORDER — AMOXICILLIN 875 MG/1
875 TABLET, FILM COATED ORAL EVERY 12 HOURS
Qty: 28 TABLET | Refills: 0 | Status: SHIPPED | OUTPATIENT
Start: 2023-05-16 | End: 2023-05-30

## 2023-05-16 RX ORDER — CIPROFLOXACIN 500 MG/1
500 TABLET ORAL EVERY 12 HOURS
Qty: 28 TABLET | Refills: 0 | Status: SHIPPED | OUTPATIENT
Start: 2023-05-16 | End: 2023-05-30

## 2023-05-16 NOTE — PROGRESS NOTES
[facility]  [address]  [id number]  Nurse Visit    Subjective:       Patient seen in clinic today. He ambulated to exam room unaided with prescribed CAM boot on LLE and tennis with lift on Right Foot. No c/o pain at present. Dressing in intact with small to moderate amounts of serosanguineous drainage, no strike through noted. Photos of wound bed taken for Infectious Disease appointment today. Dressing changed as ordered.       Assessment:          Altered Skin Integrity 05/05/23 1108 Left distal;lateral;plantar Foot Blister(s) (Active)   05/05/23 1108   Altered Skin Integrity Present on Admission - Did Patient arrive to the hospital with altered skin?: yes   Side: Left   Orientation: distal;lateral;plantar   Location: Foot   Wound Number:    Is this injury device related?: No   Primary Wound Type: Blister(s)   Description of Altered Skin Integrity:    Ankle-Brachial Index:    Pulses:    Removal Indication and Assessment:    Wound Outcome:    (Retired) Wound Length (cm):    (Retired) Wound Width (cm):    (Retired) Depth (cm):    Wound Description (Comments):    Removal Indications:    Description of Altered Skin Integrity Partial thickness tissue loss. Shallow open ulcer with a red or pink wound bed, without slough. Intact or Open/Ruptured Serum-filled blister. 05/16/23 1424   Dressing Appearance Intact;Moist drainage 05/16/23 1424   Drainage Amount Small 05/16/23 1424   Drainage Characteristics/Odor Serosanguineous;No odor 05/16/23 1424   Appearance Red;Moist;Sutures intact 05/16/23 1424   Tissue loss description Partial thickness 05/16/23 1424   Black (%), Wound Tissue Color 0 % 05/16/23 1424   Red (%), Wound Tissue Color 100 % 05/16/23 1424   Yellow (%), Wound Tissue Color 0 % 05/16/23 1424   Periwound Area Macerated;Moist 05/16/23 1424   Wound Edges Defined;Open 05/16/23 1424   Care Cleansed with:;Sterile normal saline 05/16/23 1424   Dressing Changed 05/16/23 1424    Periwound Care Moisture barrier applied;Skin barrier film applied 05/16/23 1424   Compression Tubular elasticized bandage 05/16/23 1424   Off Loading Football dressing;Off loading shoe 05/16/23 1424   Dressing Change Due 05/19/23 05/16/23 1424            (Retired) Wound 04/08/22 1137 Diabetic Ulcer Left plantar;lateral Foot (Active)   04/08/22 1137    Pre-existing: Yes   Primary Wound Type: Diabetic ulc   Side: Left   Orientation: plantar;lateral   Location: Foot   Wound Number:    Ankle-Brachial Index:    Pulses:    Removal Indication and Assessment:    Wound Outcome:    (Retired) Wound Type:    (Retired) Wound Length (cm):    (Retired) Wound Width (cm):    (Retired) Depth (cm):    Wound Description (Comments):    Removal Indications:    Wound WDL ex 05/16/23 1424   Dressing Appearance Intact;Moist drainage 05/16/23 1424   Drainage Amount Moderate 05/16/23 1424   Drainage Characteristics/Odor Serosanguineous;No odor 05/16/23 1424   Appearance Sutures intact 05/16/23 1424   Tissue loss description Full thickness 05/16/23 1424   Black (%), Wound Tissue Color 0 % 05/16/23 1424   Red (%), Wound Tissue Color 100 % 05/16/23 1424   Yellow (%), Wound Tissue Color 0 % 05/16/23 1424   Periwound Area Macerated;Moist;Pale white 05/16/23 1424   Wound Edges Callused;Defined;Open 05/16/23 1424   Care Cleansed with:;Sterile normal saline 05/16/23 1424   Dressing Changed 05/16/23 1424   Periwound Care Moisture barrier applied;Skin barrier film applied 05/16/23 1424   Compression Tubular elasticized bandage 05/16/23 1424   Off Loading Football dressing;Off loading shoe 05/16/23 1424   Dressing Change Due 05/19/23 05/16/23 1424           Plan:     Left Foot: Skin Sub in place. Reapply Cutimed and steristrips if not adhered. Aquacel Extra sheet (bustled to wound bed). Custom Felt pad around wound bed (with large hole cut to allow for drainage). Mextra long x 1. Abram foam (long x2, laid perpendicular). Football dressing (cast  padding x 3). Tubigrip single layer, size E folded over foot and secured with Medipore Tape. CAM boot.        Follow up in about 3 days (around 5/19/2023).

## 2023-05-16 NOTE — PROGRESS NOTES
Infectious Disease Clinic Note  05/22/2023       Subjective:       Patient ID: Indio Ryder Jr. is a 52 y.o. male being seen for an new visit.    Chief Complaint: Recurrent Skin Infections    HPI     Mr. Ryder is a 53 y/o M with hx DM and numerous episodes of foot osteo presents for f/up of Lt foot osteomyelitis.    Interval hx:  Last seen on 3/2/23. Completed 6 wks cipro (3/12) and 4 wks augmentin (3/8) for pseudomonas (bone and wound cx) and e faecalis (wound cx). Reports increased drainage and size of wound despite continued adherence to treatment plan concerning for infection. Images under media tab reviewed. Wound cx 5/12 grew enterobacter cloacae and e faecalis.    Hx:   Pt has been following with podiatry for a non healing Lt foot ulcer that per pt, suddenly worsened. MRI  1/19 revealed: Extensive marrow edema involving the 4th TMT joint extending to the 2nd and 3rd TMT joints contiguous with the overlying soft tissue ulceration, concerning for osteomyelitis.  Underwent bone biopsy of left trochar on 1/20. Bone culture from 1/20 grew pseudomonas. Bone path revealed OM.  Discussed results with patient and started on cipro x 6 wks on 1/29. Subsequent wound culture from 1/27 growing pseudomonas plus e faecalis (R to tetracycline/ S to amp).       Has been undergoing hyperbaric therapy since 12/2022. Had normal ALISSA 11/18/22. Was treated x 4 wks w augmenting for e faecalis positive wound cx and 6 wks with cipro  (last day 3/12) for pseudomonas OM (bone and wound cx +). Underwent graft placement on 2/24 by Dr. Peterson.        Family History   Adopted: Yes   Problem Relation Age of Onset    Hypertension Mother     Cancer Mother         breast    Diabetes Mother     Hypertension Father     Cataracts Father     Heart disease Father         mi    Diabetes Sister     No Known Problems Brother     Heart disease Sister         MI    No Known Problems Sister     Hypertension Maternal Aunt     No Known Problems  Maternal Uncle     No Known Problems Paternal Aunt     No Known Problems Paternal Uncle     No Known Problems Maternal Grandmother     No Known Problems Maternal Grandfather     No Known Problems Paternal Grandmother     No Known Problems Paternal Grandfather     Amblyopia Neg Hx     Blindness Neg Hx     Glaucoma Neg Hx     Macular degeneration Neg Hx     Retinal detachment Neg Hx     Strabismus Neg Hx     Stroke Neg Hx     Thyroid disease Neg Hx      Social History     Socioeconomic History    Marital status: Single    Number of children: 0   Occupational History    Occupation: Retired     Employer: [employer]   Tobacco Use    Smoking status: Never    Smokeless tobacco: Never   Substance and Sexual Activity    Alcohol use: No    Drug use: No    Sexual activity: Yes     Partners: Female     Birth control/protection: Condom     Past Surgical History:   Procedure Laterality Date    COLONOSCOPY Right 1/19/2022    Procedure: COLONOSCOPY;  Surgeon: Tj Haile MD;  Location: [facility] (4TH FLR);  Service: Endoscopy;  Laterality: Right;  previous order un-usable/poor prep 1/18/22  RAPID COVID test arrival 12:20  instructions handed to pt- sm    COLONOSCOPY N/A 3/21/2022    Procedure: COLONOSCOPY;  Surgeon: Sheng Haque MD;  Location: [facility] (2ND FLR);  Service: Endoscopy;  Laterality: N/A;  Colonoscopy with AES for hepatix flexure polyp removal, 2nd floor  Pt is fully vaccinated-DS  Pt prescribed Plavix by Dr. Stahl in Jan. 2022, however pt states that he never started taking it-DS  3/16/22-Instructions sent via portal-DS    DEBRIDEMENT OF FOOT Left 7/14/2019    Procedure: DEBRIDEMENT, FOOT, with left 5th ray amputation;  Surgeon: Sis Hickman DPM;  Location: [facility] OR [address] [place] [address];  Service: Podiatry;  Laterality: Left;    DEBRIDEMENT OF FOOT Left 7/17/2019    Procedure: DEBRIDEMENT, FOOT with leftt 5th ray partial amputation with Neox Graft;  Surgeon: Mai Burrell DPM;  Location: [facility] OR [address] [place] [address];   Service: Podiatry;  Laterality: Left;  t    DEBRIDEMENT OF FOOT Bilateral 4/29/2021    Procedure: DEBRIDEMENT, FOOT;  Surgeon: Mai Burrell DPM;  Location: [facility] OR 1ST FLR;  Service: Podiatry;  Laterality: Bilateral;  Graft application    TOE AMPUTATION Right 06/05/2015    TOE AMPUTATION Right 01/19/2017    XI ROBOTIC COLECTOMY, RIGHT N/A 4/25/2022    Procedure: XI ROBOTIC COLECTOMY, RIGHT (lithotomy, eras low);  Surgeon: Erik Stout MD;  Location: [facility] OR 2ND FLR;  Service: Colon and Rectal;  Laterality: N/A;  Paruch to Goodwin    XI ROBOTIC COLECTOMY, RIGHT  4/25/2022    Procedure: ;  Surgeon: Erik Stout MD;  Location: [facility] OR 2ND FLR;  Service: Colon and Rectal;;       Patient's Medications   New Prescriptions    AMOXICILLIN (AMOXIL) 875 MG TABLET    Take 1 tablet (875 mg total) by mouth every 12 (twelve) hours. for 14 days    CIPROFLOXACIN HCL (CIPRO) 500 MG TABLET    Take 1 tablet (500 mg total) by mouth every 12 (twelve) hours. for 14 days   Previous Medications    ACETAMINOPHEN (TYLENOL) 325 MG TABLET    Take 2 tablets (650 mg total) by mouth every 6 (six) hours as needed.    ATORVASTATIN (LIPITOR) 80 MG TABLET    Take 1 tablet (80 mg total) by mouth once daily.    DIPHENOXYLATE-ATROPINE 2.5-0.025 MG (LOMOTIL) 2.5-0.025 MG PER TABLET    Take 1 to 2 tablets by mouth three times daily as needed for diarrhea    EMPAGLIFLOZIN (JARDIANCE) 10 MG TABLET    Take 1 tablet (10 mg total) by mouth once daily.    FLUTICASONE PROPIONATE (FLONASE) 50 MCG/ACTUATION NASAL SPRAY    Use 2 sprays (100 mcg total) in each nostril once daily.    INSULIN ASPART U-100 (NOVOLOG) 100 UNIT/ML (3 ML) INPN PEN    Inject 8 units before meals plus scale 150-200+2, 201-250+4, 251-300+6, 301-350+8, >350+10. Max daily 54 units.    INSULIN DETEMIR U-100 (LEVEMIR FLEXTOUCH) 100 UNIT/ML (3 ML) SUBQ INPN PEN    Inject 26 Units into the skin every evening.    ONDANSETRON (ZOFRAN-ODT) 8 MG TBDL    Dissolve 1 tablet (8 mg  "total) by mouth every 8 (eight) hours as needed (Nausea).    ONETOUCH DELICA LANCETS 33 GAUGE [facility]        ONETOUCH ULTRA2 KIT        PANTOPRAZOLE (PROTONIX) 40 MG TABLET    Take 1 tablet (40 mg total) by mouth once daily.    PEN NEEDLE, DIABETIC (BD SASCHA 2ND GEN PEN NEEDLE) 32 GAUGE X 5/32" NDLE    Use 4 times a day    SEMAGLUTIDE (OZEMPIC) 1 MG/DOSE (4 MG/3 ML)    Inject 1 mg into the skin every 7 days.    TAMSULOSIN (FLOMAX) 0.4 MG CAP    Take 2 capsules (0.8 mg total) by mouth once daily. 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, with long-term current use of insulin 03/23/2021    Chronic left shoulder pain 07/30/2020    Allergic reaction due to antibacterial drug 04/05/2020    Subacute osteomyelitis of left foot 03/11/2020    Hypertensive retinopathy, bilateral 01/28/2020    Diabetic ulcer of left midfoot associated with type 2 diabetes mellitus, with bone involvement without evidence of necrosis 08/02/2019    Severe malnutrition 07/22/2019    Diabetic ulcer of right midfoot associated with type 2 diabetes mellitus, with necrosis of bone 07/12/2019    Neck pain 11/08/2017    Hypokalemia 05/30/2017    Actinomyces infection 01/17/2017     Right foot        Type 2 diabetes mellitus with hyperglycemia, with long-term current use of insulin 05/03/2016    Traumatic amputation of fifth toe of right foot 07/02/2015    Mixed hyperlipidemia 08/12/2014    " "Essential hypertension 06/06/2013       Review of Systems   Review of Systems   Constitutional:  Negative for chills, fever and malaise/fatigue.   Respiratory:  Negative for cough and shortness of breath.    Cardiovascular:  Negative for chest pain and orthopnea.   Gastrointestinal:  Negative for abdominal pain, diarrhea and vomiting.   Genitourinary:  Negative for dysuria.   Musculoskeletal:  Negative for back pain and myalgias.        B/l foot wounds   Neurological:  Positive for sensory change. Negative for weakness.         Objective:      /73 (BP Location: Left arm, Patient Position: Sitting)   Pulse 95   Temp 98.5 °F (36.9 °C) (Oral)   Ht 6' 1" (1.854 m)   Wt 105.3 kg (232 lb 2.3 oz)   BMI 30.63 kg/m²   Estimated body mass index is 30.63 kg/m² as calculated from the following:    Height as of this encounter: 6' 1" (1.854 m).    Weight as of this encounter: 105.3 kg (232 lb 2.3 oz).    Physical Exam  Vitals and nursing note reviewed.   Constitutional:       Appearance: Normal appearance. He is not ill-appearing.   HENT:      Head: Normocephalic and atraumatic.      Nose: Nose normal. No congestion.      Mouth/Throat:      Mouth: Mucous membranes are moist.      Pharynx: Oropharynx is clear.   Eyes:      Conjunctiva/sclera: Conjunctivae normal.      Pupils: Pupils are equal, round, and reactive to light.   Cardiovascular:      Rate and Rhythm: Normal rate and regular rhythm.   Pulmonary:      Effort: Pulmonary effort is normal. No respiratory distress.      Breath sounds: Normal breath sounds.   Abdominal:      General: Abdomen is flat. There is no distension.      Palpations: Abdomen is soft.   Musculoskeletal:         General: No swelling or tenderness. Normal range of motion.      Cervical back: Normal range of motion and neck supple.      Comments: Clean dressings at Lt foot with orthotic shoe.    Skin:     General: Skin is warm and dry.   Neurological:      General: No focal deficit present.      " Mental Status: He is alert and oriented to person, place, and time.   Psychiatric:         Mood and Affect: Mood normal.         Behavior: Behavior normal.       Assessment:         1. Diabetic ulcer of left midfoot associated with type 2 diabetes mellitus, with fat layer exposed                      Plan:         Diabetic ulcer of left midfoot associated with type 2 diabetes mellitus, with fat layer exposed  51y/o M with hx of LLE DM foot ulcers c/b osteo s/p 6 wks abx (last day 3/12), now with increased drainage at nonealing ulcer concerning for infection. Wound cx growing pan-sen enterobacter cloacae and amp- S e faecalis.     Plan:   --will treat x 2 wks for diabetic foot ulcer  -     amoxicillin (AMOXIL) 875 MG tablet; Take 1 tablet (875 mg total) by mouth every 12 (twelve) hours. for 14 days  Dispense: 28 tablet; Refill: 0  -     ciprofloxacin HCl (CIPRO) 500 MG tablet; Take 1 tablet (500 mg total) by mouth every 12 (twelve) hours. for 14 days  Dispense: 28 tablet; Refill: 0  --went over potential med side effects. Rec'd spacing FQ for atleast 2 hrs from dairy products and Ca/ Fe supplements.  --continue to f/u closely with podiatry      RTC 1-2 wks    Maria Dolores Tan MD  Infectious Disease     Total professional time spent for the encounter: 20 minutes  Time was spent preparing to see the patient, reviewing results of prior testing, obtaining and/or reviewing separately obtained history, performing a medically appropriate examination and interview, counseling and educating the patient/family, ordering medications/tests/procedures, referring and communicating with other health care professionals, documenting clinical information in the electronic health record, and independently interpreting results.

## 2023-05-19 ENCOUNTER — TELEPHONE (OUTPATIENT)
Dept: INTERNAL MEDICINE | Facility: CLINIC | Age: 53
End: 2023-05-19
Payer: MEDICARE

## 2023-05-19 ENCOUNTER — HOSPITAL ENCOUNTER (OUTPATIENT)
Dept: WOUND CARE | Facility: HOSPITAL | Age: 53
Discharge: HOME | End: 2023-05-19
Attending: PODIATRIST
Payer: MEDICARE

## 2023-05-19 VITALS — HEART RATE: 97 BPM | TEMPERATURE: 98 F | SYSTOLIC BLOOD PRESSURE: 126 MMHG | DIASTOLIC BLOOD PRESSURE: 82 MMHG

## 2023-05-19 DIAGNOSIS — E11.621 DIABETIC ULCER OF LEFT MIDFOOT ASSOCIATED WITH TYPE 2 DIABETES MELLITUS, WITH BONE INVOLVEMENT WITHOUT EVIDENCE OF NECROSIS: Primary | ICD-10-CM

## 2023-05-19 DIAGNOSIS — L97.426 DIABETIC ULCER OF LEFT MIDFOOT ASSOCIATED WITH TYPE 2 DIABETES MELLITUS, WITH BONE INVOLVEMENT WITHOUT EVIDENCE OF NECROSIS: Primary | ICD-10-CM

## 2023-05-19 PROCEDURE — 99213 OFFICE O/P EST LOW 20 MIN: CPT | Performed by: PODIATRIST

## 2023-05-19 PROCEDURE — 99213 PR OFFICE/OUTPT VISIT, EST, LEVL III, 20-29 MIN: ICD-10-PCS | Mod: ,,, | Performed by: PODIATRIST

## 2023-05-19 PROCEDURE — 99213 OFFICE O/P EST LOW 20 MIN: CPT | Mod: ,,, | Performed by: PODIATRIST

## 2023-05-19 NOTE — PROGRESS NOTES
[facility]                Progress Note    Subjective:       Patient ID: Indio Ryder Jr. is a 52 y.o. male.    Chief Complaint: Wound Check    Follow up wound care visit. Patient ambulated to exam room with own tennis shoe with lift on Right Foot and CAM boot on LLE. No c/o pain at present. Denies fever, chills or flu-like symptoms. Dressing to Left Foot is intact with a large amount of serosanguineous, non-malodor drainage from both wound beds.     Review of Systems   Constitutional:  Negative for appetite change, chills, fatigue and fever.   HENT:  Negative for hearing loss.    Eyes:  Negative for photophobia and visual disturbance.   Respiratory:  Negative for cough, chest tightness, shortness of breath and wheezing.    Cardiovascular:  Positive for leg swelling. Negative for chest pain and palpitations.   Gastrointestinal:  Negative for constipation, diarrhea, nausea and vomiting.   Endocrine: Negative for cold intolerance and heat intolerance.   Genitourinary:  Negative for flank pain.   Musculoskeletal:  Positive for gait problem and myalgias. Negative for neck pain and neck stiffness.   Skin:  Positive for wound. Negative for color change.   Neurological:  Positive for numbness. Negative for weakness, light-headedness and headaches.        + paresthesia    Psychiatric/Behavioral:  Negative for sleep disturbance.        Objective:        Physical Exam  Constitutional:       Appearance: He is well-developed.   Cardiovascular:      Comments: Dorsalis pedis and posterior tibial pulses are palpable Skin is atrophic, slightly hyperpigmented, and mildly edematous      Musculoskeletal:         General: No tenderness. Normal range of motion.      Comments: Muscle strength is 5/5 in all groups bilaterally.    S/p partial 5th ray amp b/l    S/p hallux amp right    Fat pad atrophy to heels and met heads bilateral       Skin:     General: Skin is warm and dry.       Coloration: Skin is not jaundiced, mottled or sallow.      Findings: Wound (see below) present. No abscess or ecchymosis.      Comments:        Neurological:      Mental Status: He is alert and oriented to person, place, and time.      Comments: Adrian-Nenita 5.07 monofilamant testing is diminished Kenji feet. Sharp/dull sensation diminished Bilaterally. Light touch absent Bilaterally.       Psychiatric:         Behavior: Behavior normal.       Vitals:    05/19/23 0804   BP: 126/82   Pulse: 97   Temp: 98.1 °F (36.7 °C)       Assessment:           ICD-10-CM ICD-9-CM   1. Diabetic ulcer of left midfoot associated with type 2 diabetes mellitus, with bone involvement without evidence of necrosis  E11.621 250.80    L97.426 707.14            Altered Skin Integrity 05/05/23 1108 Left distal;lateral;plantar Foot Blister(s) (Active)   05/05/23 1108   Altered Skin Integrity Present on Admission - Did Patient arrive to the hospital with altered skin?: yes   Side: Left   Orientation: distal;lateral;plantar   Location: Foot   Wound Number:    Is this injury device related?: No   Primary Wound Type: Blister(s)   Description of Altered Skin Integrity:    Ankle-Brachial Index:    Pulses:    Removal Indication and Assessment:    Wound Outcome:    (Retired) Wound Length (cm):    (Retired) Wound Width (cm):    (Retired) Depth (cm):    Wound Description (Comments):    Removal Indications:    Wound Image   05/19/23 0931   Description of Altered Skin Integrity Partial thickness tissue loss. Shallow open ulcer with a red or pink wound bed, without slough. Intact or Open/Ruptured Serum-filled blister. 05/19/23 0931   Dressing Appearance Intact;Moist drainage 05/19/23 0931   Drainage Amount Large 05/19/23 0931   Drainage Characteristics/Odor Serosanguineous;No odor 05/19/23 0931   Appearance Red;Moist;Sutures intact 05/19/23 0931   Tissue loss description Partial thickness 05/19/23 0931   Black (%), Wound Tissue Color 0 % 05/19/23 0931    Red (%), Wound Tissue Color 100 % 05/19/23 0931   Yellow (%), Wound Tissue Color 0 % 05/19/23 0931   Periwound Area Macerated;Pale white 05/19/23 0931   Wound Edges Defined;Open 05/19/23 0931   Wound Length (cm) 0.6 cm 05/19/23 0931   Wound Width (cm) 1 cm 05/19/23 0931   Wound Surface Area (cm^2) 0.6 cm^2 05/19/23 0931   Tunneling (depth (cm)/location) 0 05/19/23 0931   Undermining (depth (cm)/location) 0 05/19/23 0931   Care Cleansed with:;Antimicrobial agent;Sterile normal saline 05/19/23 0931   Dressing Changed 05/19/23 0931   Periwound Care Moisture barrier applied 05/19/23 0931   Compression Tubular elasticized bandage 05/19/23 0931   Off Loading Football dressing;Off loading shoe 05/19/23 0931   Dressing Change Due 05/24/23 05/19/23 0931            (Retired) Wound 04/08/22 1137 Diabetic Ulcer Left plantar;lateral Foot (Active)   04/08/22 1137    Pre-existing: Yes   Primary Wound Type: Diabetic ulc   Side: Left   Orientation: plantar;lateral   Location: Foot   Wound Number:    Ankle-Brachial Index:    Pulses:    Removal Indication and Assessment:    Wound Outcome:    (Retired) Wound Type:    (Retired) Wound Length (cm):    (Retired) Wound Width (cm):    (Retired) Depth (cm):    Wound Description (Comments):    Removal Indications:    Wound Image   05/19/23 0931   Wound WDL ex 05/19/23 0931   Dressing Appearance Intact;Moist drainage 05/19/23 0931   Drainage Amount Large 05/19/23 0931   Drainage Characteristics/Odor Serosanguineous;No odor 05/19/23 0931   Appearance Red;Moist;Sutures intact 05/19/23 0931   Black (%), Wound Tissue Color 0 % 05/19/23 0931   Red (%), Wound Tissue Color 100 % 05/19/23 0931   Yellow (%), Wound Tissue Color 0 % 05/19/23 0931   Periwound Area Macerated;Pale white 05/19/23 0931   Wound Edges Defined;Open 05/19/23 0931   Wound Length (cm) 0.7 cm 05/19/23 0931   Wound Width (cm) 0.7 cm 05/19/23 0931   Wound Surface Area (cm^2) 0.49 cm^2 05/19/23 0931   Tunneling (depth (cm)/location) 0  05/19/23 0931   Undermining (depth (cm)/location) 0 05/19/23 0931   Care Cleansed with:;Antimicrobial agent;Sterile normal saline 05/19/23 0931   Dressing Changed 05/19/23 0931   Periwound Care Moisture barrier applied 05/19/23 0931   Compression Tubular elasticized bandage 05/19/23 0931   Off Loading Football dressing;Off loading shoe 05/19/23 0931   Dressing Change Due 05/24/23 05/19/23 0931           Plan:                Education about the prevention of limb loss.    Discussed wound healing cycle, skin integrity, ways to care for skin.Counseled patient on the effects of biomechanical pressure, PVD and blood glucose on healing. He verbalizes understanding that it can increase the chances of delayed healing and this prolonged exposure leads to infection or progression of infection which subsequently can result in loss of limb.    Adequate vitamin supplementation, protein intake, and hydration - discussed with patient    Neox in place    Wound care done as per order. Return to clinic on Wednesday for NV and to see MD on Friday.     Short-term goals include maintaining good offloading and minimizing bioburden, promoting granulation and epithelialization to healing.  Long-term goals include keeping the wound healed by good offloading and medical management under the direction of internist.    Shoe inspection. Diabetic Foot Education. Patient reminded of the importance of good nutrition and blood sugar control to help prevent podiatric complications of diabetes. Patient instructed on proper foot hygeine. We discussed wearing proper shoe gear, daily foot inspections, never walking without protective shoe gear, never putting sharp instruments to feet.      Procedures      Tissue pathology and/or culture taken     [] Yes      [x] No  Sharp debridement performed                   [] Yes       [x] No  Labs ordered     [] Yes       [x] No  Imaging ordered    [] Yes      [x] No    Orders Placed This Encounter   Procedures     "Change dressing     Wound Dressing Order     Remove old dressing   Cleanse or irrigate with: Normal Saline   Protect periwound with:  Gentian Violet hugging wound bed   Primary dressing: Melgisorb Ag to wound bed then Custom Felt Pad (with large hole cut to allow for drainage)   Secondary dressing: Mextra (short 5" x 5"). Abram foam (long x2, laid perpendicular).   Compression and Off-load: Football dressing (cast padding x 3). Tubigrip single layer, size E folded over foot like sock and secured with Medipore Tape. CAM boot.    RLE: U-Pad to Plantar around bony prominence. Tubigrip single layer, size E    Change at Nurse Visit.        Follow up in about 5 days (around 5/24/2023).       "

## 2023-05-19 NOTE — TELEPHONE ENCOUNTER
----- Message from JESUS Bernardo FNP sent at 5/19/2023  2:30 PM CDT -----  Regarding: virtual appt  Offer virtual appt  Thanks  IB

## 2023-05-23 ENCOUNTER — HOSPITAL ENCOUNTER (OUTPATIENT)
Dept: WOUND CARE | Facility: HOSPITAL | Age: 53
Discharge: HOME | End: 2023-05-23
Attending: FAMILY MEDICINE
Payer: MEDICARE

## 2023-05-23 VITALS — HEART RATE: 97 BPM | TEMPERATURE: 98 F | DIASTOLIC BLOOD PRESSURE: 81 MMHG | SYSTOLIC BLOOD PRESSURE: 147 MMHG

## 2023-05-23 DIAGNOSIS — L97.426 DIABETIC ULCER OF LEFT MIDFOOT ASSOCIATED WITH TYPE 2 DIABETES MELLITUS, WITH BONE INVOLVEMENT WITHOUT EVIDENCE OF NECROSIS: Primary | ICD-10-CM

## 2023-05-23 DIAGNOSIS — E11.621 DIABETIC ULCER OF LEFT MIDFOOT ASSOCIATED WITH TYPE 2 DIABETES MELLITUS, WITH BONE INVOLVEMENT WITHOUT EVIDENCE OF NECROSIS: Primary | ICD-10-CM

## 2023-05-23 NOTE — PROGRESS NOTES
[facility]  [address]  [address]  Nurse Visit    Subjective:       Patient seen in clinic today. He ambulated to exam room unaided with prescribed CAM boot on LLE and tennis shoe with lift on Right Foot. No c/o pain to wound beds at present. Dressing is intact with large amount of serosanguineous drainage, with strike through noted to Tubigrip layer. Photos of wound bed taken. Patient reports walking a lot for his daughter's graduation over the weekend. He reports taking oral antibiotic (Amoxil) as ordered. Dressing changed as ordered.      Assessment:          Altered Skin Integrity 05/05/23 1108 Left distal;lateral;plantar Foot Blister(s) (Active)   05/05/23 1108   Altered Skin Integrity Present on Admission - Did Patient arrive to the hospital with altered skin?: yes   Side: Left   Orientation: distal;lateral;plantar   Location: Foot   Wound Number:    Is this injury device related?: No   Primary Wound Type: Blister(s)   Description of Altered Skin Integrity:    Ankle-Brachial Index:    Pulses:    Removal Indication and Assessment:    Wound Outcome:    (Retired) Wound Length (cm):    (Retired) Wound Width (cm):    (Retired) Depth (cm):    Wound Description (Comments):    Removal Indications:    Description of Altered Skin Integrity Partial thickness tissue loss. Shallow open ulcer with a red or pink wound bed, without slough. Intact or Open/Ruptured Serum-filled blister. 05/23/23 1346   Dressing Appearance Intact;Moist drainage;Area marked 05/23/23 1346   Drainage Amount Large 05/23/23 1346   Drainage Characteristics/Odor Serosanguineous;No odor 05/23/23 1346   Appearance Red;Moist;Sutures intact 05/23/23 1346   Tissue loss description Partial thickness 05/23/23 1346   Black (%), Wound Tissue Color 0 % 05/23/23 1346   Red (%), Wound Tissue Color 100 % 05/23/23 1346   Yellow (%), Wound Tissue Color 0 % 05/23/23 1346   Periwound Area Macerated;Moist;Pale white 05/23/23  1346   Wound Edges Defined;Open 05/23/23 1346   Care Cleansed with:;Antimicrobial agent;Sterile normal saline 05/23/23 1346   Dressing Changed 05/23/23 1346   Periwound Care Moisture barrier applied 05/23/23 1346   Compression Tubular elasticized bandage 05/23/23 1346   Off Loading Football dressing;Off loading shoe 05/23/23 1346   Dressing Change Due 05/26/23 05/23/23 1346            (Retired) Wound 04/08/22 1137 Diabetic Ulcer Left plantar;lateral Foot (Active)   04/08/22 1137    Pre-existing: Yes   Primary Wound Type: Diabetic ulc   Side: Left   Orientation: plantar;lateral   Location: Foot   Wound Number:    Ankle-Brachial Index:    Pulses:    Removal Indication and Assessment:    Wound Outcome:    (Retired) Wound Type:    (Retired) Wound Length (cm):    (Retired) Wound Width (cm):    (Retired) Depth (cm):    Wound Description (Comments):    Removal Indications:    Wound WDL ex 05/23/23 1346   Dressing Appearance Intact;Moist drainage;Area marked 05/23/23 1346   Drainage Amount Large 05/23/23 1346   Drainage Characteristics/Odor Serosanguineous;No odor 05/23/23 1346   Appearance Red;Moist;Sutures intact 05/23/23 1346   Tissue loss description Full thickness 05/23/23 1346   Black (%), Wound Tissue Color 0 % 05/23/23 1346   Red (%), Wound Tissue Color 100 % 05/23/23 1346   Yellow (%), Wound Tissue Color 0 % 05/23/23 1346   Periwound Area Macerated;Moist;Pale white 05/23/23 1346   Wound Edges Defined;Open 05/23/23 1346   Care Cleansed with:;Antimicrobial agent;Sterile normal saline 05/23/23 1346   Dressing Changed 05/23/23 1346   Periwound Care Moisture barrier applied 05/23/23 1346   Compression Tubular elasticized bandage 05/23/23 1346   Off Loading Football dressing;Off loading shoe 05/23/23 1346   Dressing Change Due 05/26/23 05/23/23 1346           Plan:     Wound Dressing Order     Remove old dressing   Cleanse or irrigate with: Normal Saline   Protect periwound with:  Gentian Violet hugging wound bed  "  Primary dressing: Melgisorb Ag to wound bed then Custom Felt Pad (with large hole cut to allow for drainage)   Secondary dressing: Mextra (short 5" x 5"). Abram foam (long x2, laid perpendicular).   Compression and Off-load: Football dressing (cast padding x 3). Tubigrip single layer, size E folded over foot like sock and secured with Medipore Tape. CAM boot.           Follow up in about 3 days (around 5/26/2023) for wound care.        "

## 2023-05-26 ENCOUNTER — HOSPITAL ENCOUNTER (OUTPATIENT)
Dept: WOUND CARE | Facility: HOSPITAL | Age: 53
Discharge: HOME | End: 2023-05-26
Attending: FAMILY MEDICINE
Payer: MEDICARE

## 2023-05-26 VITALS — DIASTOLIC BLOOD PRESSURE: 81 MMHG | HEART RATE: 89 BPM | SYSTOLIC BLOOD PRESSURE: 147 MMHG | TEMPERATURE: 98 F

## 2023-05-26 DIAGNOSIS — E11.621 DIABETIC ULCER OF LEFT FOOT: Primary | ICD-10-CM

## 2023-05-26 DIAGNOSIS — L97.529 DIABETIC ULCER OF LEFT FOOT: Primary | ICD-10-CM

## 2023-05-26 PROCEDURE — 99214 OFFICE O/P EST MOD 30 MIN: CPT | Mod: ,,, | Performed by: FAMILY MEDICINE

## 2023-05-26 PROCEDURE — 99213 OFFICE O/P EST LOW 20 MIN: CPT | Mod: 27

## 2023-05-26 PROCEDURE — 99214 OFFICE O/P EST MOD 30 MIN: CPT | Performed by: FAMILY MEDICINE

## 2023-05-26 PROCEDURE — 99214 PR OFFICE/OUTPT VISIT, EST, LEVL IV, 30-39 MIN: ICD-10-PCS | Mod: ,,, | Performed by: FAMILY MEDICINE

## 2023-05-26 NOTE — PROGRESS NOTES
[facility]                Progress Note    Subjective:       Patient ID: Indio Ryder Jr. is a 52 y.o. male.    Chief Complaint: No chief complaint on file.    Walked to clinic unaided with shoe lift to right foot and left football. Drainage through football and now dry and yellow oderless. Asked about bigger plantar custom pad as back to wearing boot. No Malgesorb used as not available but Aquacel AG placed. Long Mextra placed. Tubigrip and c pad to right leg. Will come to nurse visit and weekly appointment Wound bed red with sutures intact.    Review of Systems   Constitutional: Negative.    HENT: Negative.     Eyes: Negative.    Respiratory: Negative.     Cardiovascular: Negative.    Gastrointestinal: Negative.    Skin:  Positive for wound.   Psychiatric/Behavioral: Negative.         Objective:        Physical Exam  Constitutional:       Appearance: Normal appearance.   HENT:      Head: Normocephalic and atraumatic.      Mouth/Throat:      Mouth: Mucous membranes are moist.      Pharynx: Oropharynx is clear.   Eyes:      Extraocular Movements: Extraocular movements intact.      Pupils: Pupils are equal, round, and reactive to light.   Abdominal:      General: There is no distension.      Palpations: Abdomen is soft.   Skin:     General: Skin is warm.      Comments: +right DFU without excess slough   Neurological:      Mental Status: He is alert.       Vitals:    05/26/23 1108   BP: (!) 147/81   Pulse: 89   Temp: 98.2 °F (36.8 °C)       Assessment:           ICD-10-CM ICD-9-CM   1. Diabetic ulcer of left foot  E11.621 250.80    L97.529 707.15            Altered Skin Integrity 05/05/23 1108 Left distal;lateral;plantar Foot Blister(s) (Active)   05/05/23 1108   Altered Skin Integrity Present on Admission - Did Patient arrive to the hospital with altered skin?: yes   Side: Left   Orientation: distal;lateral;plantar   Location: Foot   Wound Number:    Is this injury  device related?: No   Primary Wound Type: Blister(s)   Description of Altered Skin Integrity:    Ankle-Brachial Index:    Pulses:    Removal Indication and Assessment:    Wound Outcome:    (Retired) Wound Length (cm):    (Retired) Wound Width (cm):    (Retired) Depth (cm):    Wound Description (Comments):    Removal Indications:    Description of Altered Skin Integrity Full thickness tissue loss. Subcutaneous fat may be visible but bone, tendon or muscle are not exposed 05/26/23 1209   Dressing Appearance Dry;Intact 05/26/23 1209   Appearance Red 05/26/23 1209   Tissue loss description Full thickness 05/26/23 1209   Red (%), Wound Tissue Color 100 % 05/26/23 1209   Periwound Area Intact;Dry 05/26/23 1209   Wound Edges Defined 05/26/23 1209   Wound Length (cm) 0.6 cm 05/26/23 1209   Wound Width (cm) 1 cm 05/26/23 1209   Wound Depth (cm) 0.3 cm 05/26/23 1209   Wound Volume (cm^3) 0.18 cm^3 05/26/23 1209   Wound Surface Area (cm^2) 0.6 cm^2 05/26/23 1209   Care Cleansed with:;Antimicrobial agent 05/26/23 1209   Dressing Changed 05/26/23 1209   Compression Tubular elasticized bandage 05/26/23 1209   Off Loading Football dressing;Off loading shoe 05/26/23 1209   Dressing Change Due 06/02/23 05/26/23 1209            (Retired) Wound 04/08/22 1137 Diabetic Ulcer Left plantar;lateral Foot (Active)   04/08/22 1137    Pre-existing: Yes   Primary Wound Type: Diabetic ulc   Side: Left   Orientation: plantar;lateral   Location: Foot   Wound Number:    Ankle-Brachial Index:    Pulses:    Removal Indication and Assessment:    Wound Outcome:    (Retired) Wound Type:    (Retired) Wound Length (cm):    (Retired) Wound Width (cm):    (Retired) Depth (cm):    Wound Description (Comments):    Removal Indications:    Wound Image   05/26/23 1209   Wound WDL ex 05/26/23 1209   Dressing Appearance Dry;Dried drainage 05/26/23 1209   Drainage Amount Moderate 05/26/23 1209   Drainage Characteristics/Odor Serous;Yellow;No odor 05/26/23 1209  "  Appearance Red 05/26/23 1209   Tissue loss description Full thickness 05/26/23 1209   Red (%), Wound Tissue Color 100 % 05/26/23 1209   Periwound Area Intact;Dry 05/26/23 1209   Wound Edges Defined 05/26/23 1209   Wound Length (cm) 0.7 cm 05/26/23 1209   Wound Width (cm) 0.7 cm 05/26/23 1209   Wound Depth (cm) 0.3 cm 05/26/23 1209   Wound Volume (cm^3) 0.147 cm^3 05/26/23 1209   Wound Surface Area (cm^2) 0.49 cm^2 05/26/23 1209   Care Cleansed with:;Antimicrobial agent;Sterile normal saline 05/26/23 1209   Dressing Changed 05/26/23 1209   Compression Tubular elasticized bandage 05/26/23 1209   Off Loading Football dressing;Off loading shoe 05/26/23 1209   Dressing Change Due 06/02/23 05/26/23 1209           Plan:              Tissue pathology and/or culture taken     [] Yes      [x] No  Sharp debridement performed                   [] Yes       [x] No  Labs ordered     [] Yes       [x] No  Imaging ordered    [] Yes      [x] No    Orders Placed This Encounter   Procedures    Change dressing     Wound Dressing Order     Remove old dressing   Cleanse or irrigate with: Normal Saline   Protect periwound with:  Gentian Violet hugging wound bed   Primary dressing: Melgisorb Ag, not available Aquacel AG used to wound bed then Custom Felt Pad (with large hole cut to allow for drainage)   Secondary dressing: Mextra (short 5" x 5"). Abram foam (long x2, laid perpendicular).   Compression and Off-load: Football dressing (cast padding x 3). Tubigrip single layer, size E folded over foot like sock and secured with Medipore Tape. CAM boot.     RLE: U-Pad to Plantar around bony prominence. Tubigrip single layer, size E     Change at Nurse Visit.        Follow up in about 1 week (around 6/2/2023) for  and nurse visit.       Neftali Burrell MD      "

## 2023-05-30 ENCOUNTER — HOSPITAL ENCOUNTER (OUTPATIENT)
Dept: WOUND CARE | Facility: HOSPITAL | Age: 53
Discharge: HOME | End: 2023-05-30
Attending: FAMILY MEDICINE
Payer: MEDICARE

## 2023-05-30 VITALS — SYSTOLIC BLOOD PRESSURE: 141 MMHG | TEMPERATURE: 98 F | DIASTOLIC BLOOD PRESSURE: 93 MMHG | HEART RATE: 91 BPM

## 2023-05-30 DIAGNOSIS — E11.621 DIABETIC ULCER OF LEFT MIDFOOT ASSOCIATED WITH TYPE 2 DIABETES MELLITUS, WITH BONE INVOLVEMENT WITHOUT EVIDENCE OF NECROSIS: Primary | ICD-10-CM

## 2023-05-30 DIAGNOSIS — L97.426 DIABETIC ULCER OF LEFT MIDFOOT ASSOCIATED WITH TYPE 2 DIABETES MELLITUS, WITH BONE INVOLVEMENT WITHOUT EVIDENCE OF NECROSIS: Primary | ICD-10-CM

## 2023-05-30 PROCEDURE — 99212 OFFICE O/P EST SF 10 MIN: CPT

## 2023-05-30 NOTE — PROGRESS NOTES
[facility]  [address]  [address]  Nurse Visit    Subjective:       Patient seen in clinic today. Patient ambulated to exam room with own tennis shoe with lift on Right Foot and CAM boot on LLE. No c/o pain to wound beds at present. Denies fever, chills or flu-like symptoms. Dressing to Left Foot is intact with a moderate amount of serosanguineous, non-malodor drainage from both wound beds. Dressing changed as ordered.      Assessment:          Altered Skin Integrity 05/05/23 1108 Left distal;lateral;plantar Foot Blister(s) (Active)   05/05/23 1108   Altered Skin Integrity Present on Admission - Did Patient arrive to the hospital with altered skin?: yes   Side: Left   Orientation: distal;lateral;plantar   Location: Foot   Wound Number:    Is this injury device related?: No   Primary Wound Type: Blister(s)   Description of Altered Skin Integrity:    Ankle-Brachial Index:    Pulses:    Removal Indication and Assessment:    Wound Outcome:    (Retired) Wound Length (cm):    (Retired) Wound Width (cm):    (Retired) Depth (cm):    Wound Description (Comments):    Removal Indications:    Description of Altered Skin Integrity Partial thickness tissue loss. Shallow open ulcer with a red or pink wound bed, without slough. Intact or Open/Ruptured Serum-filled blister. 05/30/23 0953   Dressing Appearance Intact;Moist drainage 05/30/23 0953   Drainage Amount Moderate 05/30/23 0953   Drainage Characteristics/Odor Serosanguineous;No odor 05/30/23 0953   Appearance Pink;Sutures intact 05/30/23 0953   Tissue loss description Partial thickness 05/30/23 0953   Black (%), Wound Tissue Color 0 % 05/30/23 0953   Red (%), Wound Tissue Color 100 % 05/30/23 0953   Yellow (%), Wound Tissue Color 0 % 05/30/23 0953   Periwound Area Pale white 05/30/23 0953   Wound Edges Defined;Open 05/30/23 0953   Care Cleansed with:;Antimicrobial agent;Sterile normal saline 05/30/23 0953   Dressing Changed 05/30/23  0953   Periwound Care Moisture barrier applied 05/30/23 0953   Compression Tubular elasticized bandage 05/30/23 0953   Off Loading Football dressing;Off loading shoe 05/30/23 0953   Dressing Change Due 06/02/23 05/30/23 0953            (Retired) Wound 04/08/22 1137 Diabetic Ulcer Left plantar;lateral Foot (Active)   04/08/22 1137    Pre-existing: Yes   Primary Wound Type: Diabetic ulc   Side: Left   Orientation: plantar;lateral   Location: Foot   Wound Number:    Ankle-Brachial Index:    Pulses:    Removal Indication and Assessment:    Wound Outcome:    (Retired) Wound Type:    (Retired) Wound Length (cm):    (Retired) Wound Width (cm):    (Retired) Depth (cm):    Wound Description (Comments):    Removal Indications:    Wound Image   05/30/23 0953   Wound WDL ex 05/30/23 0953   Dressing Appearance Intact;Moist drainage 05/30/23 0953   Drainage Amount Moderate 05/30/23 0953   Drainage Characteristics/Odor Serosanguineous;No odor 05/30/23 0953   Appearance Pink;Red;Sutures intact 05/30/23 0953   Tissue loss description Full thickness 05/30/23 0953   Black (%), Wound Tissue Color 0 % 05/30/23 0953   Red (%), Wound Tissue Color 100 % 05/30/23 0953   Yellow (%), Wound Tissue Color 0 % 05/30/23 0953   Periwound Area Pale white 05/30/23 0953   Wound Edges Callused;Defined;Open 05/30/23 0953   Care Cleansed with:;Antimicrobial agent;Sterile normal saline;Wound cleanser 05/30/23 0953   Dressing Changed 05/30/23 0953   Periwound Care Moisture barrier applied 05/30/23 0953   Compression Tubular elasticized bandage 05/30/23 0953   Off Loading Football dressing;Off loading shoe 05/30/23 0953   Dressing Change Due 06/02/23 05/30/23 0953           Plan:     Wound Dressing Order     Remove old dressing   Cleanse or irrigate with: Vashe soak for 10 minutes   Protect periwound with:  Gentian Violet hugging wound bed, Cavilon to plantar foot   Primary dressing: Promogran then Melgisorb Ag to wound bed then Custom Felt Pad (with large  "hole cut to allow for drainage)   Secondary dressing: Mextra (short 5" x 5"). Abram foam (long x2, laid perpendicular).   Compression and Off-load: Football dressing (cast padding x 3). Tubigrip single layer, size E folded over foot like sock and secured with Medipore Tape. CAM boot.           Follow up in about 3 days (around 6/2/2023).          "

## 2023-06-02 ENCOUNTER — HOSPITAL ENCOUNTER (OUTPATIENT)
Dept: WOUND CARE | Facility: HOSPITAL | Age: 53
Discharge: HOME | End: 2023-06-02
Attending: FAMILY MEDICINE
Payer: MEDICARE

## 2023-06-02 VITALS — HEART RATE: 90 BPM | TEMPERATURE: 98 F | SYSTOLIC BLOOD PRESSURE: 140 MMHG | DIASTOLIC BLOOD PRESSURE: 81 MMHG

## 2023-06-02 DIAGNOSIS — L97.426 DIABETIC ULCER OF LEFT MIDFOOT ASSOCIATED WITH TYPE 2 DIABETES MELLITUS, WITH BONE INVOLVEMENT WITHOUT EVIDENCE OF NECROSIS: Primary | ICD-10-CM

## 2023-06-02 DIAGNOSIS — E11.621 DIABETIC ULCER OF LEFT MIDFOOT ASSOCIATED WITH TYPE 2 DIABETES MELLITUS, WITH BONE INVOLVEMENT WITHOUT EVIDENCE OF NECROSIS: Primary | ICD-10-CM

## 2023-06-02 PROCEDURE — 99214 OFFICE O/P EST MOD 30 MIN: CPT | Mod: 25,,, | Performed by: FAMILY MEDICINE

## 2023-06-02 PROCEDURE — 11042 DBRDMT SUBQ TIS 1ST 20SQCM/<: CPT | Mod: ,,, | Performed by: FAMILY MEDICINE

## 2023-06-02 PROCEDURE — 11042 DBRDMT SUBQ TIS 1ST 20SQCM/<: CPT | Performed by: FAMILY MEDICINE

## 2023-06-02 PROCEDURE — 11042 DEBRIDEMENT: ICD-10-PCS | Mod: ,,, | Performed by: FAMILY MEDICINE

## 2023-06-02 PROCEDURE — 99214 PR OFFICE/OUTPT VISIT, EST, LEVL IV, 30-39 MIN: ICD-10-PCS | Mod: 25,,, | Performed by: FAMILY MEDICINE

## 2023-06-02 NOTE — PROGRESS NOTES
[facility]                Progress Note    Subjective:       Patient ID: Indio Ryder Jr. is a 52 y.o. male.    Chief Complaint: Wound Check    Follow up wound care visit. Patient ambulated to exam room with own tennis shoe with lift on Right Foot and CAM boot on LLE. No c/o pain at present. Denies fever, chills or flu-like symptoms. Dressing to Left Foot is intact with a moderate amount of serosanguineous, non-malodor drainage from both wound beds. Left Lateral Plantar Foot wound is measuring larger in (1.3 cm) length, in (1.1 cm) width after debridement, there was a large amount of maceration noted around wound bed. Left Distal Plantar Foot wound is measuring larger in (0.2 cm) width.     Wound care done as per order. Return to clinic on Wednesday for a Nurse Visit and then 1 week to see MD. Patient declined AVS.      This is an established patient in wound care.   Patient presents in the office today for evaluation of the chronic wound.  Updated HPI is noted below.    The periwound appears non-erythematous, maceration noted, non-edematous    The wound appears to have increased in size. Periwound callus present. Serous drainage. No odor.     Patient denies fever, chills    Patients reports no wound pain    Lymphedema status is noncontributory to wound status    Contributing factors to current wound state include numerous comorbid conditions, Diabetes Type 2, off-loading limitations, Lymphedema      Review of Systems   Constitutional:  Negative for activity change, appetite change and fever.   HENT:  Negative for congestion.    Respiratory:  Negative for shortness of breath and wheezing.    Cardiovascular:  Negative for chest pain and leg swelling.   Gastrointestinal:  Negative for abdominal pain, nausea and vomiting.   Skin:  Positive for wound.   Neurological:  Negative for dizziness and headaches.   Psychiatric/Behavioral:  The patient is not nervous/anxious.         Objective:        Physical Exam  Vitals and nursing note reviewed.   Constitutional:       Appearance: He is well-developed.   HENT:      Head: Normocephalic and atraumatic.   Eyes:      Conjunctiva/sclera: Conjunctivae normal.   Pulmonary:      Effort: Pulmonary effort is normal.   Abdominal:      General: There is no distension.      Palpations: Abdomen is soft.   Musculoskeletal:         General: Normal range of motion.      Cervical back: Normal range of motion and neck supple.   Skin:     Comments: See wound description   Neurological:      Mental Status: He is alert and oriented to person, place, and time.   Psychiatric:         Behavior: Behavior normal.       Vitals:    06/02/23 1100   BP: (!) 140/81   Pulse: 90   Temp: 98 °F (36.7 °C)       Assessment:           ICD-10-CM ICD-9-CM   1. Diabetic ulcer of left midfoot associated with type 2 diabetes mellitus, with bone involvement without evidence of necrosis  E11.621 250.80    L97.426 707.14            Altered Skin Integrity 05/05/23 1108 Left distal;lateral;plantar Foot Blister(s) (Active)   05/05/23 1108   Altered Skin Integrity Present on Admission - Did Patient arrive to the hospital with altered skin?: yes   Side: Left   Orientation: distal;lateral;plantar   Location: Foot   Wound Number:    Is this injury device related?: No   Primary Wound Type: Blister(s)   Description of Altered Skin Integrity:    Ankle-Brachial Index:    Pulses:    Removal Indication and Assessment:    Wound Outcome:    (Retired) Wound Length (cm):    (Retired) Wound Width (cm):    (Retired) Depth (cm):    Wound Description (Comments):    Removal Indications:    Wound Image   06/02/23 1157   Description of Altered Skin Integrity Partial thickness tissue loss. Shallow open ulcer with a red or pink wound bed, without slough. Intact or Open/Ruptured Serum-filled blister. 06/02/23 1157   Dressing Appearance Intact;Moist drainage 06/02/23 1157   Drainage Amount Moderate 06/02/23  1157   Drainage Characteristics/Odor Serosanguineous;No odor 06/02/23 1157   Appearance Red;Wet 06/02/23 1157   Tissue loss description Partial thickness 06/02/23 1157   Black (%), Wound Tissue Color 0 % 06/02/23 1157   Red (%), Wound Tissue Color 100 % 06/02/23 1157   Yellow (%), Wound Tissue Color 0 % 06/02/23 1157   Periwound Area Macerated;Moist;Pale white 06/02/23 1157   Wound Edges Defined;Open 06/02/23 1157   Wound Length (cm) 0.6 cm 06/02/23 1157   Wound Width (cm) 0.4 cm 06/02/23 1157   Wound Depth (cm) 0.2 cm 06/02/23 1157   Wound Volume (cm^3) 0.048 cm^3 06/02/23 1157   Wound Surface Area (cm^2) 0.24 cm^2 06/02/23 1157   Tunneling (depth (cm)/location) 0 06/02/23 1157   Undermining (depth (cm)/location) 0 06/02/23 1157   Care Cleansed with:;Antimicrobial agent;Sterile normal saline 06/02/23 1157   Dressing Changed 06/02/23 1157   Periwound Care Moisture barrier applied 06/02/23 1157   Compression Tubular elasticized bandage 06/02/23 1157   Off Loading Football dressing;Off loading shoe 06/02/23 1157   Dressing Change Due 06/07/23 06/02/23 1157            (Retired) Wound 04/08/22 1137 Diabetic Ulcer Left plantar;lateral Foot (Active)   04/08/22 1137    Pre-existing: Yes   Primary Wound Type: Diabetic ulc   Side: Left   Orientation: plantar;lateral   Location: Foot   Wound Number:    Ankle-Brachial Index:    Pulses:    Removal Indication and Assessment:    Wound Outcome:    (Retired) Wound Type:    (Retired) Wound Length (cm):    (Retired) Wound Width (cm):    (Retired) Depth (cm):    Wound Description (Comments):    Removal Indications:    Wound Image    06/02/23 1157   Wound WDL ex 06/02/23 1157   Dressing Appearance Intact;Moist drainage 06/02/23 1157   Drainage Amount Moderate 06/02/23 1157   Drainage Characteristics/Odor Serosanguineous;No odor 06/02/23 1157   Appearance Red;Moist 06/02/23 1157   Tissue loss description Full thickness 06/02/23 1157   Black (%), Wound Tissue Color 0 % 06/02/23 1157    Red (%), Wound Tissue Color 100 % 06/02/23 1157   Yellow (%), Wound Tissue Color 0 % 06/02/23 1157   Periwound Area Macerated;Moist;Pale white 06/02/23 1157   Wound Edges Defined;Open 06/02/23 1157   Wound Length (cm) 2 cm 06/02/23 1157   Wound Width (cm) 1.8 cm 06/02/23 1157   Wound Depth (cm) 0.3 cm 06/02/23 1157   Wound Volume (cm^3) 1.08 cm^3 06/02/23 1157   Wound Surface Area (cm^2) 3.6 cm^2 06/02/23 1157   Tunneling (depth (cm)/location) 0 06/02/23 1157   Undermining (depth (cm)/location) 0 06/02/23 1157   Care Cleansed with:;Antimicrobial agent;Sterile normal saline 06/02/23 1157   Dressing Changed 06/02/23 1157   Periwound Care Moisture barrier applied 06/02/23 1157   Compression Tubular elasticized bandage 06/02/23 1157   Off Loading Football dressing;Off loading shoe 06/02/23 1157   Dressing Change Due 06/07/23 06/02/23 1157           Debridement    Date/Time: 6/2/2023 10:53 AM  Performed by: Ruth Sandoval MD  Authorized by: Ruth Sandoval MD     Consent Done?:  Yes (Verbal)  Local anesthesia used?: Yes    Local anesthetic:  Topical anesthetic    Wound Details:    Location:  Left foot    Location:  Left Plantar    Type of Debridement:  Excisional       Length (cm):  2       Area (sq cm):  3.6       Width (cm):  1.8       Percent Debrided (%):  100       Depth (cm):  0.3       Total Area Debrided (sq cm):  3.6    Depth of debridement:  Subcutaneous tissue    Tissue debrided:  Subcutaneous    Devitalized tissue debrided:  Slough, Fibrin and Callus    Instruments:  Curette  Bleeding:  Minimal  Hemostasis Achieved: Yes  Method Used:  Pressure    Additional wounds:  1    2nd Wound Details:     Location:  Left foot    Location:  Left Plantar    Location:  Left Plantar    Type of Debridement:  Excisional       Length (cm):  0.6       Area (sq cm):  0.24       Width (cm):  0.4       Percent Debrided (%):  100       Depth (cm):  0.2       Total Area Debrided (sq cm):  0.24    Depth of debridement:  Subcutaneous  "tissue    Tissue debrided:  Subcutaneous    Devitalized tissue debrided:  Slough, Fibrin and Callus    Instruments:  Curette  Bleeding:  Minimal  Hemostasis Achieved: Yes    Method Used:  Pressure  Patient tolerance:  Patient tolerated the procedure well with no immediate complications    Plan:            Debridement needed and Done today.   Recommend off-loading  Avoid walking - use walking boot at all times   Consider knee walker   Monitor blood glucose   Elevate legs   Avoid getting dressing wet  Keep dressing clean and intact  Continue with wound care orders and plan as noted in orders.   Continue to follow current medication regimen as per pcp   Call for any questions / concerns.       Tissue pathology and/or culture taken     [] Yes      [x] No  Sharp debridement performed                   [x] Yes       [] No  Labs ordered     [] Yes       [x] No  Imaging ordered    [] Yes      [x] No    Orders Placed This Encounter   Procedures    Change dressing     Wound Dressing Order     Remove old dressing   Cleanse or irrigate with: Normal Saline, Sutures removed (per MD)  Protect periwound with:  Gentian Violet hugging wound bed, Cavilon/Benzoin to Plantar Foot where Custom Pad will be placed   Primary dressing: Endoform/Iodoflex (confetti mix) to wound bed then Custom Felt Pad (with large hole cut to allow for drainage)   Secondary dressing: Drawtex over hole then Mextra (long 5" x 9"). Abram foam (long x2, laid perpendicular).   Compression and Off-load: Football dressing (cast padding x 3). Tubigrip single layer, size E folded over foot like sock and secured with Medipore Tape. CAM boot.     RLE: Cavilon then U-Pad to Plantar around bony prominence. Tubigrip single layer, size E     Change at Nurse Visit.        Follow up in about 5 days (around 6/7/2023) for wound care.       "

## 2023-06-07 ENCOUNTER — HOSPITAL ENCOUNTER (OUTPATIENT)
Dept: WOUND CARE | Facility: HOSPITAL | Age: 53
Discharge: HOME | End: 2023-06-07
Attending: FAMILY MEDICINE
Payer: MEDICARE

## 2023-06-07 VITALS — SYSTOLIC BLOOD PRESSURE: 158 MMHG | DIASTOLIC BLOOD PRESSURE: 74 MMHG | HEART RATE: 99 BPM | TEMPERATURE: 98 F

## 2023-06-07 DIAGNOSIS — E11.621 DIABETIC ULCER OF LEFT MIDFOOT ASSOCIATED WITH TYPE 2 DIABETES MELLITUS, WITH BONE INVOLVEMENT WITHOUT EVIDENCE OF NECROSIS: Primary | ICD-10-CM

## 2023-06-07 DIAGNOSIS — L97.426 DIABETIC ULCER OF LEFT MIDFOOT ASSOCIATED WITH TYPE 2 DIABETES MELLITUS, WITH BONE INVOLVEMENT WITHOUT EVIDENCE OF NECROSIS: Primary | ICD-10-CM

## 2023-06-07 PROCEDURE — 99212 OFFICE O/P EST SF 10 MIN: CPT

## 2023-06-07 NOTE — PROGRESS NOTES
[facility]  [address]  [address]  Nurse Visit    Subjective:       Patient seen in clinic today. Patient ambulated to exam room with own tennis shoe with lift on Right Foot and CAM boot on LLE. No c/o pain to Left Foot wound beds at present. Denies fever, chills or flu-like symptoms. Dressing to Left Foot is intact with a moderate amount of serosanguineous, non-malodor drainage from both wound beds, no strike through noted. Dressing changed as ordered.      Assessment:          Altered Skin Integrity 05/05/23 1108 Left distal;lateral;plantar Foot Blister(s) (Active)   05/05/23 1108   Altered Skin Integrity Present on Admission - Did Patient arrive to the hospital with altered skin?: yes   Side: Left   Orientation: distal;lateral;plantar   Location: Foot   Wound Number:    Is this injury device related?: No   Primary Wound Type: Blister(s)   Description of Altered Skin Integrity:    Ankle-Brachial Index:    Pulses:    Removal Indication and Assessment:    Wound Outcome:    (Retired) Wound Length (cm):    (Retired) Wound Width (cm):    (Retired) Depth (cm):    Wound Description (Comments):    Removal Indications:    Description of Altered Skin Integrity Partial thickness tissue loss. Shallow open ulcer with a red or pink wound bed, without slough. Intact or Open/Ruptured Serum-filled blister. 06/07/23 0948   Dressing Appearance Intact;Moist drainage 06/07/23 0948   Drainage Amount Moderate 06/07/23 0948   Drainage Characteristics/Odor Serosanguineous;No odor 06/07/23 0948   Appearance Red;Moist 06/07/23 0948   Tissue loss description Partial thickness 06/07/23 0948   Periwound Area Macerated;Moist;Pale white 06/07/23 0948   Wound Edges Defined;Open 06/07/23 0948   Care Cleansed with:;Antimicrobial agent;Sterile normal saline 06/07/23 0948   Dressing Changed 06/07/23 0948   Periwound Care Moisture barrier applied 06/07/23 0948   Compression Tubular elasticized bandage  "06/07/23 0948   Off Loading Football dressing;Off loading shoe 06/07/23 0948   Dressing Change Due 06/09/23 06/07/23 0948            (Retired) Wound 04/08/22 1137 Diabetic Ulcer Left plantar;lateral Foot (Active)   04/08/22 1137    Pre-existing: Yes   Primary Wound Type: Diabetic ulc   Side: Left   Orientation: plantar;lateral   Location: Foot   Wound Number:    Ankle-Brachial Index:    Pulses:    Removal Indication and Assessment:    Wound Outcome:    (Retired) Wound Type:    (Retired) Wound Length (cm):    (Retired) Wound Width (cm):    (Retired) Depth (cm):    Wound Description (Comments):    Removal Indications:    Wound WDL ex 06/07/23 0948   Dressing Appearance Intact;Moist drainage 06/07/23 0948   Drainage Amount Moderate 06/07/23 0948   Drainage Characteristics/Odor Serosanguineous;No odor 06/07/23 0948   Appearance Red;Moist 06/07/23 0948   Tissue loss description Partial thickness 06/07/23 0948   Black (%), Wound Tissue Color 0 % 06/07/23 0948   Red (%), Wound Tissue Color 100 % 06/07/23 0948   Yellow (%), Wound Tissue Color 0 % 06/07/23 0948   Periwound Area Macerated;Moist;Pale white 06/07/23 0948   Wound Edges Defined;Open 06/07/23 0948   Care Cleansed with:;Antimicrobial agent;Sterile normal saline 06/07/23 0948   Dressing Changed 06/07/23 0948   Periwound Care Moisture barrier applied 06/07/23 0948   Compression Tubular elasticized bandage 06/07/23 0948   Off Loading Football dressing;Off loading shoe 06/07/23 0948   Dressing Change Due 06/09/23 06/07/23 0948           Plan:     Wound Dressing Order     Remove old dressing   Cleanse or irrigate with: Normal Saline   Protect periwound with:  Gentian Violet hugging wound bed, Cavilon/Benzoin to Plantar Foot where Custom Pad will be placed   Primary dressing: Endoform/Iodoflex (confetti mix) to wound bed then Custom Felt Pad (with large hole cut to allow for drainage)   Secondary dressing: Drawtex over hole then Mextra (long 5" x 9"). Abram foam (long " x2, laid perpendicular).   Compression and Off-load: Football dressing (cast padding x 3). Tubigrip single layer, size E folded over foot like sock and secured with Medipore Tape. CAM boot.           Follow up in about 2 days (around 6/9/2023) for wound care.           Cigarettes

## 2023-06-09 ENCOUNTER — HOSPITAL ENCOUNTER (OUTPATIENT)
Dept: WOUND CARE | Facility: HOSPITAL | Age: 53
Discharge: HOME | End: 2023-06-09
Attending: PODIATRIST
Payer: MEDICARE

## 2023-06-09 VITALS — TEMPERATURE: 98 F | DIASTOLIC BLOOD PRESSURE: 89 MMHG | HEART RATE: 89 BPM | SYSTOLIC BLOOD PRESSURE: 170 MMHG

## 2023-06-09 DIAGNOSIS — E11.621 DIABETIC ULCER OF LEFT MIDFOOT ASSOCIATED WITH TYPE 2 DIABETES MELLITUS, WITH FAT LAYER EXPOSED: Primary | ICD-10-CM

## 2023-06-09 DIAGNOSIS — E11.621 DIABETIC ULCER OF RIGHT FOOT ASSOCIATED WITH TYPE 2 DIABETES MELLITUS, WITH FAT LAYER EXPOSED: ICD-10-CM

## 2023-06-09 DIAGNOSIS — L97.512 DIABETIC ULCER OF RIGHT FOOT ASSOCIATED WITH TYPE 2 DIABETES MELLITUS, WITH FAT LAYER EXPOSED: ICD-10-CM

## 2023-06-09 DIAGNOSIS — L84 PRE-ULCERATIVE CALLUSES: ICD-10-CM

## 2023-06-09 DIAGNOSIS — L97.422 DIABETIC ULCER OF LEFT MIDFOOT ASSOCIATED WITH TYPE 2 DIABETES MELLITUS, WITH FAT LAYER EXPOSED: Primary | ICD-10-CM

## 2023-06-09 PROCEDURE — 99499 UNLISTED E&M SERVICE: CPT | Mod: ,,, | Performed by: PODIATRIST

## 2023-06-09 PROCEDURE — 99499 NO LOS: ICD-10-PCS | Mod: ,,, | Performed by: PODIATRIST

## 2023-06-09 PROCEDURE — 11042 DEBRIDEMENT: ICD-10-PCS | Mod: ,,, | Performed by: PODIATRIST

## 2023-06-09 PROCEDURE — 11042 DBRDMT SUBQ TIS 1ST 20SQCM/<: CPT | Performed by: PODIATRIST

## 2023-06-09 PROCEDURE — 11042 DBRDMT SUBQ TIS 1ST 20SQCM/<: CPT | Mod: ,,, | Performed by: PODIATRIST

## 2023-06-09 NOTE — PROGRESS NOTES
[facility]                Progress Note    Subjective:       Patient ID: Indio Ryder Jr. is a 52 y.o. male.    Chief Complaint: Wound Check    Returns to clinic for follow up of left foot wound. Walked to clinic unaided with dsg intact and  wearing cam boot on left with shoe with lift on right. Drainage was serous and odorless with drainage through custom pad.     Review of Systems   Constitutional:  Negative for appetite change, chills, fatigue and fever.   HENT:  Negative for hearing loss.    Eyes:  Negative for photophobia and visual disturbance.   Respiratory:  Negative for cough, chest tightness, shortness of breath and wheezing.    Cardiovascular:  Positive for leg swelling. Negative for chest pain and palpitations.   Gastrointestinal:  Negative for constipation, diarrhea, nausea and vomiting.   Endocrine: Negative for cold intolerance and heat intolerance.   Genitourinary:  Negative for flank pain.   Musculoskeletal:  Positive for gait problem and myalgias. Negative for neck pain and neck stiffness.   Skin:  Positive for wound. Negative for color change.   Neurological:  Positive for numbness. Negative for weakness, light-headedness and headaches.        + paresthesia    Psychiatric/Behavioral:  Negative for sleep disturbance.        Objective:        Physical Exam  Constitutional:       Appearance: He is well-developed.   Cardiovascular:      Comments: Dorsalis pedis and posterior tibial pulses are palpable Skin is atrophic, slightly hyperpigmented, and mildly edematous      Musculoskeletal:         General: No tenderness. Normal range of motion.      Comments: Muscle strength is 5/5 in all groups bilaterally.    S/p partial 5th ray amp b/l    S/p hallux amp right    Fat pad atrophy to heels and met heads bilateral       Skin:     General: Skin is warm and dry.      Coloration: Skin is not jaundiced, mottled or sallow.      Findings: Wound (see below)  present. No abscess or ecchymosis.      Comments:        Neurological:      Mental Status: He is alert and oriented to person, place, and time.      Comments: Dalton-Nenita 5.07 monofilamant testing is diminished Kenji feet. Sharp/dull sensation diminished Bilaterally. Light touch absent Bilaterally.       Psychiatric:         Behavior: Behavior normal.       Vitals:    06/09/23 1036   BP: (!) 170/89   Pulse: 89   Temp: 98 °F (36.7 °C)       Assessment:           ICD-10-CM ICD-9-CM   1. Diabetic ulcer of left midfoot associated with type 2 diabetes mellitus, with fat layer exposed  E11.621 250.80    L97.422 707.14   2. Diabetic ulcer of right foot associated with type 2 diabetes mellitus, with fat layer exposed  E11.621 250.80    L97.512 707.15   3. Pre-ulcerative calluses  L84 700            Altered Skin Integrity 05/05/23 1108 Left distal;lateral;plantar Foot Blister(s) (Active)   05/05/23 1108   Altered Skin Integrity Present on Admission - Did Patient arrive to the hospital with altered skin?: yes   Side: Left   Orientation: distal;lateral;plantar   Location: Foot   Wound Number:    Is this injury device related?: No   Primary Wound Type: Blister(s)   Description of Altered Skin Integrity:    Ankle-Brachial Index:    Pulses:    Removal Indication and Assessment:    Wound Outcome:    (Retired) Wound Length (cm):    (Retired) Wound Width (cm):    (Retired) Depth (cm):    Wound Description (Comments):    Removal Indications:    Wound Image   06/09/23 1142   Description of Altered Skin Integrity Full thickness tissue loss. Subcutaneous fat may be visible but bone, tendon or muscle are not exposed 06/09/23 1142   Dressing Appearance Dry;Intact;Dried drainage 06/09/23 1142   Drainage Amount Small 06/09/23 1142   Drainage Characteristics/Odor Serous;No odor 06/09/23 1142   Appearance Intact 06/09/23 1142   Tissue loss description Partial thickness 06/09/23 1142   Red (%), Wound Tissue Color 100 % 06/09/23 1142    Periwound Area Intact;Dry 06/09/23 1142   Wound Edges Defined 06/09/23 1142   Wound Length (cm) 0.4 cm 06/09/23 1138   Wound Width (cm) 0.4 cm 06/09/23 1138   Wound Depth (cm) 0.2 cm 06/09/23 1138   Wound Volume (cm^3) 0.032 cm^3 06/09/23 1138   Wound Surface Area (cm^2) 0.16 cm^2 06/09/23 1138   Care Cleansed with:;Antimicrobial agent;Sterile normal saline 06/09/23 1142   Dressing Changed 06/09/23 1142   Off Loading Off loading shoe;Football dressing 06/09/23 1142   Dressing Change Due 06/16/23 06/09/23 1142            (Retired) Wound 04/08/22 1137 Diabetic Ulcer Left plantar;lateral Foot (Active)   04/08/22 1137    Pre-existing: Yes   Primary Wound Type: Diabetic ulc   Side: Left   Orientation: plantar;lateral   Location: Foot   Wound Number:    Ankle-Brachial Index:    Pulses:    Removal Indication and Assessment:    Wound Outcome:    (Retired) Wound Type:    (Retired) Wound Length (cm):    (Retired) Wound Width (cm):    (Retired) Depth (cm):    Wound Description (Comments):    Removal Indications:    Wound Image   06/09/23 1138   Wound WDL ex 06/09/23 1138   Dressing Appearance Dried drainage 06/09/23 1138   Drainage Amount Small 06/09/23 1138   Drainage Characteristics/Odor Serous;No odor 06/09/23 1138   Appearance Red 06/09/23 1138   Red (%), Wound Tissue Color 100 % 06/09/23 1138   Periwound Area Intact;Dry 06/09/23 1138   Wound Edges Defined 06/09/23 1138   Wound Length (cm) 1.5 cm 06/09/23 1138   Wound Width (cm) 1.8 cm 06/09/23 1138   Wound Depth (cm) 0.3 cm 06/09/23 1138   Wound Volume (cm^3) 0.81 cm^3 06/09/23 1138   Wound Surface Area (cm^2) 2.7 cm^2 06/09/23 1138   Care Cleansed with:;Antimicrobial agent;Sterile normal saline 06/09/23 1138   Dressing Changed 06/09/23 1138   Off Loading Football dressing;Off loading shoe 06/09/23 1138   Dressing Change Due 06/16/23 06/09/23 1138           Plan:                Education about the prevention of limb loss.    Discussed wound healing cycle, skin  "integrity, ways to care for skin.Counseled patient on the effects of biomechanical pressure, PVD and blood glucose on healing. He verbalizes understanding that it can increase the chances of delayed healing and this prolonged exposure leads to infection or progression of infection which subsequently can result in loss of limb.    Adequate vitamin supplementation, protein intake, and hydration - discussed with patient    I custom cut and personally applied an offloading pad to be applied to the foot due to deformity, body habitus, activity level    Return to clinic in one week. Will call pt if any cancellations due to unavailability of nurse visit. Refused printed AVS    Short-term goals include maintaining good offloading and minimizing bioburden, promoting granulation and epithelialization to healing.  Long-term goals include keeping the wound healed by good offloading and medical management under the direction of internist.    Shoe inspection. Diabetic Foot Education. Patient reminded of the importance of good nutrition and blood sugar control to help prevent podiatric complications of diabetes. Patient instructed on proper foot hygeine. We discussed wearing proper shoe gear, daily foot inspections, never walking without protective shoe gear, never putting sharp instruments to feet.      Debridement    Date/Time: 6/9/2023 10:28 AM  Performed by: Marivel Peterson DPM  Authorized by: Marivel Peterson DPM     Time out: Immediately prior to procedure a "time out" was called to verify the correct patient, procedure, equipment, support staff and site/side marked as required.      Preparation: Patient was prepped and draped with aseptic technique    Local anesthesia used?: No      Wound Details:    Location:  Left foot    Debridement - 1st Wound - Specific Location: lateral midfoot.    Type of Debridement:  Excisional       Length (cm):  0.4       Area (sq cm):  0.16       Width (cm):  0.4       Percent Debrided (%):  100    " "   Depth (cm):  0.2       Total Area Debrided (sq cm):  0.16    Depth of debridement:  Subcutaneous tissue    Tissue debrided:  Epidermis, Dermis and Subcutaneous    Devitalized tissue debrided:  Callus and Exudate    Instruments:  Curette  Bleeding:  Minimal  Hemostasis Achieved: Yes  Method Used:  Pressure    Additional wounds:  1    2nd Wound Details:     Location:  Left foot    Debridement - 2nd Wound - Specific Location: plantar lateral midfoot.    Debridement - 2nd Wound - Specific Location: plantar lateral midfoot.    Type of Debridement:  Excisional       Length (cm):  1.5       Area (sq cm):  2.7       Width (cm):  1.8       Percent Debrided (%):  100       Depth (cm):  0.3       Total Area Debrided (sq cm):  2.7    Depth of debridement:  Subcutaneous tissue    Tissue debrided:  Dermis, Epidermis and Subcutaneous    Devitalized tissue debrided:  Fibrin and Callus    Instruments:  Curette  Bleeding:  Minimal  Hemostasis Achieved: Yes    Method Used:  Pressure  Patient tolerance:  Patient tolerated the procedure well with no immediate complications      Tissue pathology and/or culture taken     [] Yes      [x] No  Sharp debridement performed                   [] Yes       [x] No  Labs ordered     [] Yes       [x] No  Imaging ordered    [] Yes      [x] No    Orders Placed This Encounter   Procedures    Debridement     This order was created via procedure documentation     Standing Status:   Standing     Number of Occurrences:   1    Change dressing     Remove old dressing   Cleanse or irrigate with: Normal Saline, Sutures removed (per MD)   Protect periwound with:  Gentian Violet hugging wound bed, Cavilon/Benzoin to Plantar Foot where Custom Pad will be placed   Primary dressing: Cellerate Rx (applied by MD) to wound bed covered by Melgisorb AG to wound bed then Custom Felt Pad (with large hole cut to allow for drainage)   Secondary dressing: Drawtex over hole then Mextra (long 5" x 9"). Abram foam (long " x2, laid perpendicular).   Compression and Off-load: Football dressing (cast padding x 3). Tubigrip single layer, size E folded over foot like sock and secured with Medipore Tape. CAM boot.     RLE: Cavilon then custom pad to Plantar around bony prominence. Tubigrip single layer, size E     Change at Nurse Visit none available will call if cancelation.        Follow up for nurse visit.

## 2023-06-12 NOTE — ASSESSMENT & PLAN NOTE
Goal Outcome Evaluation:  Plan of Care Reviewed With: patient     Patient alert and oriented. Medicated with prn pain medication. Wound care performed per orders. Educated patient on importance of turning in bed to prevent further skin breakdown. Blood glucose monitored. No other changes at this time.         Topical antifungal prescribed

## 2023-06-14 ENCOUNTER — HOSPITAL ENCOUNTER (OUTPATIENT)
Dept: WOUND CARE | Facility: HOSPITAL | Age: 53
Discharge: HOME | End: 2023-06-14
Attending: FAMILY MEDICINE
Payer: MEDICARE

## 2023-06-14 VITALS — SYSTOLIC BLOOD PRESSURE: 187 MMHG | TEMPERATURE: 98 F | DIASTOLIC BLOOD PRESSURE: 92 MMHG | HEART RATE: 85 BPM

## 2023-06-14 DIAGNOSIS — E11.621 DIABETIC ULCER OF LEFT MIDFOOT ASSOCIATED WITH TYPE 2 DIABETES MELLITUS, WITH FAT LAYER EXPOSED: Primary | ICD-10-CM

## 2023-06-14 DIAGNOSIS — L97.422 DIABETIC ULCER OF LEFT MIDFOOT ASSOCIATED WITH TYPE 2 DIABETES MELLITUS, WITH FAT LAYER EXPOSED: Primary | ICD-10-CM

## 2023-06-14 PROCEDURE — 99212 OFFICE O/P EST SF 10 MIN: CPT

## 2023-06-14 NOTE — PROGRESS NOTES
[facility]  [address]  [address]  Nurse Visit    Subjective:       Patient seen in clinic today. Patient ambulated to exam room with own tennis shoe with lift on Right Foot and CAM boot on LLE. No c/o pain at present. Denies fever, chills or flu-like symptoms. Dressing intact with a moderate amount of brown, non-malodor drainage from Left Foot wounds, no strike through noted. Dressing changed as ordered.      Assessment:          Altered Skin Integrity 05/05/23 1108 Left distal;lateral;plantar Foot Blister(s) (Active)   05/05/23 1108   Altered Skin Integrity Present on Admission - Did Patient arrive to the hospital with altered skin?: yes   Side: Left   Orientation: distal;lateral;plantar   Location: Foot   Wound Number:    Is this injury device related?: No   Primary Wound Type: Blister(s)   Description of Altered Skin Integrity:    Ankle-Brachial Index:    Pulses:    Removal Indication and Assessment:    Wound Outcome:    (Retired) Wound Length (cm):    (Retired) Wound Width (cm):    (Retired) Depth (cm):    Wound Description (Comments):    Removal Indications:    Description of Altered Skin Integrity Partial thickness tissue loss. Shallow open ulcer with a red or pink wound bed, without slough. Intact or Open/Ruptured Serum-filled blister. 06/14/23 0838   Dressing Appearance Intact;Moist drainage 06/14/23 0838   Drainage Amount Moderate 06/14/23 0838   Drainage Characteristics/Odor Brown;No odor 06/14/23 0838   Appearance Pink;Moist 06/14/23 0838   Tissue loss description Partial thickness 06/14/23 0838   Black (%), Wound Tissue Color 0 % 06/14/23 0838   Red (%), Wound Tissue Color 100 % 06/14/23 0838   Yellow (%), Wound Tissue Color 0 % 06/14/23 0838   Periwound Area Pale white 06/14/23 0838   Wound Edges Defined;Open 06/14/23 0838   Care Cleansed with:;Antimicrobial agent;Sterile normal saline 06/14/23 0838   Dressing Changed 06/14/23 0838   Periwound Care Moisture  barrier applied 06/14/23 0838   Compression Tubular elasticized bandage 06/14/23 0838   Off Loading Football dressing;Off loading shoe 06/14/23 0838   Dressing Change Due 06/16/23 06/14/23 0838            (Retired) Wound 04/08/22 1137 Diabetic Ulcer Left plantar;lateral Foot (Active)   04/08/22 1137    Pre-existing: Yes   Primary Wound Type: Diabetic ulc   Side: Left   Orientation: plantar;lateral   Location: Foot   Wound Number:    Ankle-Brachial Index:    Pulses:    Removal Indication and Assessment:    Wound Outcome:    (Retired) Wound Type:    (Retired) Wound Length (cm):    (Retired) Wound Width (cm):    (Retired) Depth (cm):    Wound Description (Comments):    Removal Indications:    Wound WDL ex 06/14/23 0838   Dressing Appearance Intact;Moist drainage 06/14/23 0838   Drainage Amount Moderate 06/14/23 0838   Drainage Characteristics/Odor Brown;No odor 06/14/23 0838   Appearance Red;Moist 06/14/23 0838   Tissue loss description Partial thickness 06/14/23 0838   Black (%), Wound Tissue Color 0 % 06/14/23 0838   Red (%), Wound Tissue Color 100 % 06/14/23 0838   Yellow (%), Wound Tissue Color 0 % 06/14/23 0838   Periwound Area Pale white 06/14/23 0838   Wound Edges Defined;Open 06/14/23 0838   Care Cleansed with:;Antimicrobial agent;Sterile normal saline 06/14/23 0838   Dressing Changed 06/14/23 0838   Periwound Care Moisture barrier applied 06/14/23 0838   Compression Tubular elasticized bandage 06/14/23 0838   Off Loading Football dressing;Off loading shoe 06/14/23 0838   Dressing Change Due 06/16/23 06/14/23 0838           Plan:     Remove old dressing   Cleanse or irrigate with: Normal Saline,   Protect periwound with:  Gentian Violet hugging wound bed, Cavilon/Benzoin to Plantar Foot where Custom Pad will be placed   Primary dressing: Cellerate Rx (per MD) to wound bed covered by UrgoTul Ag then Melgisorb AG, Custom Felt Pad (with large hole cut to allow for drainage)   Secondary dressing: Drawtex over hole  "then Mextra (long 5" x 9"). Abram foam (long x2, laid perpendicular).   Compression and Off-load: Football dressing (cast padding x 3). Tubigrip single layer, size E folded over foot like sock and secured with Medipore Tape. CAM boot.           Follow up in about 2 days (around 6/16/2023) for wound care.          "

## 2023-06-16 ENCOUNTER — HOSPITAL ENCOUNTER (OUTPATIENT)
Dept: WOUND CARE | Facility: HOSPITAL | Age: 53
Discharge: HOME | End: 2023-06-16
Attending: PODIATRIST
Payer: MEDICARE

## 2023-06-16 VITALS — HEART RATE: 86 BPM | TEMPERATURE: 97 F | DIASTOLIC BLOOD PRESSURE: 81 MMHG | SYSTOLIC BLOOD PRESSURE: 159 MMHG

## 2023-06-16 DIAGNOSIS — L97.512 DIABETIC ULCER OF RIGHT FOOT ASSOCIATED WITH TYPE 2 DIABETES MELLITUS, WITH FAT LAYER EXPOSED: Primary | ICD-10-CM

## 2023-06-16 DIAGNOSIS — L97.422 DIABETIC ULCER OF LEFT MIDFOOT ASSOCIATED WITH TYPE 2 DIABETES MELLITUS, WITH FAT LAYER EXPOSED: ICD-10-CM

## 2023-06-16 DIAGNOSIS — E11.621 DIABETIC ULCER OF LEFT MIDFOOT ASSOCIATED WITH TYPE 2 DIABETES MELLITUS, WITH FAT LAYER EXPOSED: ICD-10-CM

## 2023-06-16 DIAGNOSIS — E11.621 DIABETIC ULCER OF RIGHT FOOT ASSOCIATED WITH TYPE 2 DIABETES MELLITUS, WITH FAT LAYER EXPOSED: Primary | ICD-10-CM

## 2023-06-16 PROCEDURE — 11042 DBRDMT SUBQ TIS 1ST 20SQCM/<: CPT | Mod: ,,, | Performed by: PODIATRIST

## 2023-06-16 PROCEDURE — 99499 UNLISTED E&M SERVICE: CPT | Mod: ,,, | Performed by: PODIATRIST

## 2023-06-16 PROCEDURE — 11042 WOUND DEBRIDEMENT: ICD-10-PCS | Mod: ,,, | Performed by: PODIATRIST

## 2023-06-16 PROCEDURE — 11042 DBRDMT SUBQ TIS 1ST 20SQCM/<: CPT | Performed by: PODIATRIST

## 2023-06-16 PROCEDURE — 99499 NO LOS: ICD-10-PCS | Mod: ,,, | Performed by: PODIATRIST

## 2023-06-21 ENCOUNTER — HOSPITAL ENCOUNTER (OUTPATIENT)
Dept: WOUND CARE | Facility: HOSPITAL | Age: 53
Discharge: HOME | End: 2023-06-21
Attending: FAMILY MEDICINE
Payer: MEDICARE

## 2023-06-21 VITALS — HEART RATE: 85 BPM | DIASTOLIC BLOOD PRESSURE: 81 MMHG | SYSTOLIC BLOOD PRESSURE: 126 MMHG | TEMPERATURE: 98 F

## 2023-06-21 PROCEDURE — 99213 OFFICE O/P EST LOW 20 MIN: CPT

## 2023-06-21 PROCEDURE — 99213 OFFICE O/P EST LOW 20 MIN: CPT | Performed by: FAMILY MEDICINE

## 2023-06-21 NOTE — PROGRESS NOTES
[facility]  [address] [place]  [address]  Nurse Visit    Subjective:       Patient seen in clinic today.      Pt arrived to [facility] ambulating without any issues. Pt denies any fever, chills, or flu-like symptoms; denies pain/discomfort. Pt reports not having any issues with drsg since last visit. Drsg removed & wound cleaned by RN, wound assessed (see image below). Dressing changed as ordered. Pt will return to [facility] on Friday for MD srinivasan.             Assessment:          Altered Skin Integrity 05/05/23 1108 Left distal;lateral;plantar Foot Blister(s) (Active)   05/05/23 1108   Altered Skin Integrity Present on Admission - Did Patient arrive to the hospital with altered skin?: yes   Side: Left   Orientation: distal;lateral;plantar   Location: Foot   Wound Number:    Is this injury device related?: No   Primary Wound Type: Blister(s)   Description of Altered Skin Integrity:    Ankle-Brachial Index:    Pulses:    Removal Indication and Assessment:    Wound Outcome:    (Retired) Wound Length (cm):    (Retired) Wound Width (cm):    (Retired) Depth (cm):    Wound Description (Comments):    Removal Indications:    Description of Altered Skin Integrity Full thickness tissue loss. Subcutaneous fat may be visible but bone, tendon or muscle are not exposed 06/21/23 1342   Dressing Appearance Moist drainage 06/21/23 1342   Drainage Amount Moderate 06/21/23 1342   Drainage Characteristics/Odor Serosanguineous 06/21/23 1342   Appearance Red 06/21/23 1342   Tissue loss description Full thickness 06/21/23 1342   Black (%), Wound Tissue Color 0 % 06/21/23 1342   Red (%), Wound Tissue Color 100 % 06/21/23 1342   Yellow (%), Wound Tissue Color 0 % 06/21/23 1342   Periwound Area Intact 06/21/23 1342   Wound Edges Defined 06/21/23 1342   Wound Length (cm) 0.3 cm 06/21/23 1342   Wound Width (cm) 0.4 cm 06/21/23 1342   Wound Depth (cm) 0.1 cm 06/21/23 1342   Wound Volume (cm^3) 0.012 cm^3 06/21/23 1342    Wound Surface Area (cm^2) 0.12 cm^2 06/21/23 1342   Tunneling (depth (cm)/location) 0 06/21/23 1342   Undermining (depth (cm)/location) 0 06/21/23 1342   Care Cleansed with:;Sterile normal saline 06/21/23 1342   Dressing Applied 06/21/23 1342   Periwound Care Moisture barrier applied 06/21/23 1342   Compression Tubular elasticized bandage 06/21/23 1342   Off Loading Football dressing;Off loading shoe 06/21/23 1342            (Retired) Wound 04/08/22 1137 Diabetic Ulcer Left plantar;lateral Foot (Active)   04/08/22 1137    Pre-existing: Yes   Primary Wound Type: Diabetic ulc   Side: Left   Orientation: plantar;lateral   Location: Foot   Wound Number:    Ankle-Brachial Index:    Pulses:    Removal Indication and Assessment:    Wound Outcome:    (Retired) Wound Type:    (Retired) Wound Length (cm):    (Retired) Wound Width (cm):    (Retired) Depth (cm):    Wound Description (Comments):    Removal Indications:    Wound WDL ex 06/21/23 1342   Dressing Appearance Moist drainage 06/21/23 1342   Drainage Amount Moderate 06/21/23 1342   Drainage Characteristics/Odor Serosanguineous 06/21/23 1342   Appearance Red 06/21/23 1342   Tissue loss description Full thickness 06/21/23 1342   Black (%), Wound Tissue Color 0 % 06/21/23 1342   Red (%), Wound Tissue Color 100 % 06/21/23 1342   Yellow (%), Wound Tissue Color 0 % 06/21/23 1342   Periwound Area Intact 06/21/23 1342   Wound Edges Defined 06/21/23 1342   Wound Length (cm) 1.4 cm 06/21/23 1342   Wound Width (cm) 1.2 cm 06/21/23 1342   Wound Depth (cm) 0.2 cm 06/21/23 1342   Wound Volume (cm^3) 0.336 cm^3 06/21/23 1342   Wound Surface Area (cm^2) 1.68 cm^2 06/21/23 1342   Tunneling (depth (cm)/location) 0 06/21/23 1342   Undermining (depth (cm)/location) 0 06/21/23 1342   Care Cleansed with:;Sterile normal saline 06/21/23 1342   Dressing Applied 06/21/23 1342   Periwound Care Moisture barrier applied 06/21/23 1342   Compression Tubular elasticized bandage 06/21/23 1342   Off  Loading Football dressing;Off loading shoe 06/21/23 1342           Plan:     No orders of the defined types were placed in this encounter.          Follow up in about 2 days (around 6/23/2023).

## 2023-06-23 ENCOUNTER — HOSPITAL ENCOUNTER (OUTPATIENT)
Dept: WOUND CARE | Facility: HOSPITAL | Age: 53
Discharge: HOME | End: 2023-06-23
Attending: PODIATRIST
Payer: MEDICARE

## 2023-06-23 VITALS — HEART RATE: 84 BPM | TEMPERATURE: 98 F | DIASTOLIC BLOOD PRESSURE: 73 MMHG | SYSTOLIC BLOOD PRESSURE: 157 MMHG

## 2023-06-23 DIAGNOSIS — E11.621 DIABETIC ULCER OF LEFT FOOT: Primary | ICD-10-CM

## 2023-06-23 DIAGNOSIS — L97.529 DIABETIC ULCER OF LEFT FOOT: Primary | ICD-10-CM

## 2023-06-23 PROCEDURE — 11042 DBRDMT SUBQ TIS 1ST 20SQCM/<: CPT | Performed by: PODIATRIST

## 2023-06-23 PROCEDURE — 99214 OFFICE O/P EST MOD 30 MIN: CPT | Performed by: PODIATRIST

## 2023-06-23 PROCEDURE — 11042 DBRDMT SUBQ TIS 1ST 20SQCM/<: CPT | Mod: ,,, | Performed by: PODIATRIST

## 2023-06-23 PROCEDURE — 99499 UNLISTED E&M SERVICE: CPT | Mod: ,,, | Performed by: PODIATRIST

## 2023-06-23 PROCEDURE — 11042 DEBRIDEMENT: ICD-10-PCS | Mod: ,,, | Performed by: PODIATRIST

## 2023-06-23 PROCEDURE — 99499 NO LOS: ICD-10-PCS | Mod: ,,, | Performed by: PODIATRIST

## 2023-06-23 NOTE — PROGRESS NOTES
[facility]                Progress Note    Subjective:       Patient ID: Indio Ryder Jr. is a 52 y.o. male.    Chief Complaint: Wound Check    HPI    Review of Systems      Objective:        Physical Exam    Vitals:    06/23/23 0810   BP: (!) 157/73   Pulse: 84   Temp: 98.2 °F (36.8 °C)       Assessment:           ICD-10-CM ICD-9-CM   1. Diabetic ulcer of left foot  E11.621 250.80    L97.529 707.15            Altered Skin Integrity 05/05/23 1108 Left distal;lateral;plantar Foot Blister(s) (Active)   05/05/23 1108   Altered Skin Integrity Present on Admission - Did Patient arrive to the hospital with altered skin?: yes   Side: Left   Orientation: distal;lateral;plantar   Location: Foot   Wound Number:    Is this injury device related?: No   Primary Wound Type: Blister(s)   Description of Altered Skin Integrity:    Ankle-Brachial Index:    Pulses:    Removal Indication and Assessment:    Wound Outcome:    (Retired) Wound Length (cm):    (Retired) Wound Width (cm):    (Retired) Depth (cm):    Wound Description (Comments):    Removal Indications:    Wound Image   06/23/23 0823   Description of Altered Skin Integrity Partial thickness tissue loss. Shallow open ulcer with a red or pink wound bed, without slough. Intact or Open/Ruptured Serum-filled blister. 06/23/23 0823   Dressing Appearance Dry;Intact 06/23/23 0823   Drainage Amount None 06/23/23 0823   Appearance Pink 06/23/23 0823   Tissue loss description Partial thickness 06/23/23 0823   Red (%), Wound Tissue Color 100 % 06/23/23 0823   Periwound Area Intact;Dry 06/23/23 0823   Wound Edges Defined 06/23/23 0823   Wound Length (cm) 0.3 cm 06/23/23 0823   Wound Width (cm) 0.3 cm 06/23/23 0823   Wound Depth (cm) 0.1 cm 06/23/23 0823   Wound Volume (cm^3) 0.009 cm^3 06/23/23 0823   Wound Surface Area (cm^2) 0.09 cm^2 06/23/23 0823   Care Cleansed with:;Antimicrobial agent;Sterile normal saline 06/23/23 0823   Dressing  Changed 06/23/23 0823   Off Loading Football dressing;Off loading shoe 06/23/23 0823   Dressing Change Due 06/30/23 06/23/23 0823            (Retired) Wound 04/08/22 1137 Diabetic Ulcer Left plantar;lateral Foot (Active)   04/08/22 1137    Pre-existing: Yes   Primary Wound Type: Diabetic ulc   Side: Left   Orientation: plantar;lateral   Location: Foot   Wound Number:    Ankle-Brachial Index:    Pulses:    Removal Indication and Assessment:    Wound Outcome:    (Retired) Wound Type:    (Retired) Wound Length (cm):    (Retired) Wound Width (cm):    (Retired) Depth (cm):    Wound Description (Comments):    Removal Indications:    Wound Image   06/23/23 0823   Wound WDL ex 06/23/23 0823   Dressing Appearance Dry;Intact 06/23/23 0823   Appearance Pink 06/23/23 0823   Tissue loss description Partial thickness 06/23/23 0823   Red (%), Wound Tissue Color 100 % 06/23/23 0823   Periwound Area Intact;Dry 06/23/23 0823   Wound Edges Defined 06/23/23 0823   Wound Length (cm) 1.3 cm 06/23/23 0823   Wound Width (cm) 1.2 cm 06/23/23 0823   Wound Depth (cm) 0.3 cm 06/23/23 0823   Wound Volume (cm^3) 0.468 cm^3 06/23/23 0823   Wound Surface Area (cm^2) 1.56 cm^2 06/23/23 0823   Care Cleansed with:;Antimicrobial agent;Sterile normal saline 06/23/23 0823   Dressing Changed 06/23/23 0823   Off Loading Off loading shoe;Football dressing 06/23/23 0823   Dressing Change Due 06/30/23 06/23/23 0823           Plan:              Tissue pathology and/or culture taken     [] Yes      [x] No  Sharp debridement performed                   [x] Yes       [] No  Labs ordered     [] Yes       [x] No  Imaging ordered    [] Yes      [] No    Orders Placed This Encounter   Procedures    Change dressing     Remove old dressing   Cleanse or irrigate with: Normal Saline   Left Foot   Protect periwound with:  Gentian Violet hugging wound bed, Cavilon/Benzoin to Plantar Foot where Custom Pad will be placed   Primary dressing: Cellerate Rx (per MD) to wound  "bed covered by UrgoTul Ag then Melgisorb AG, Custom Felt Pad (with large holes cut to allow for drainage)    Mextra (long 5" x 9"). Abram foam (long x2, laid perpendicular).   Compression and Off-load: Football dressing (cast padding x 3). Tubigrip double layer, size E folded over foot like sock and secured with Medipore Tape to both legs. CAM boot.     RLE: Cavilon then U-pad to plantar around bony prominence Teardrop to plantar heel. Tubigrip double  layer, size E        Follow up in about 1 week (around 6/30/2023) for nurse visit.         "

## 2023-06-23 NOTE — PROGRESS NOTES
[facility]                Progress Note    Subjective:       Patient ID: Indio Ryder Jr. is a 52 y.o. male.    Chief Complaint: Wound Check    Returns to clinic for follow up of chronic left foot wound.  Walked to clinic wearing appropriate footwear. No new pedal complaints.     Wound Check    Review of Systems   Constitutional:  Negative for appetite change, chills, fatigue and fever.   HENT:  Negative for hearing loss.    Eyes:  Negative for photophobia and visual disturbance.   Respiratory:  Negative for cough, chest tightness, shortness of breath and wheezing.    Cardiovascular:  Positive for leg swelling. Negative for chest pain and palpitations.   Gastrointestinal:  Negative for constipation, diarrhea, nausea and vomiting.   Endocrine: Negative for cold intolerance and heat intolerance.   Genitourinary:  Negative for flank pain.   Musculoskeletal:  Positive for gait problem and myalgias. Negative for neck pain and neck stiffness.   Skin:  Positive for wound. Negative for color change.   Neurological:  Positive for numbness. Negative for weakness, light-headedness and headaches.        + paresthesia    Psychiatric/Behavioral:  Negative for sleep disturbance.        Objective:        Physical Exam  Constitutional:       Appearance: He is well-developed.   Cardiovascular:      Comments: Dorsalis pedis and posterior tibial pulses are palpable Skin is atrophic, slightly hyperpigmented, and mildly edematous      Musculoskeletal:         General: No tenderness. Normal range of motion.      Comments: Muscle strength is 5/5 in all groups bilaterally.    S/p partial 5th ray amp b/l    S/p hallux amp right    Fat pad atrophy to heels and met heads bilateral       Skin:     General: Skin is warm and dry.      Coloration: Skin is not jaundiced, mottled or sallow.      Findings: Wound (see below) present. No abscess or ecchymosis.      Comments:        Neurological:       Mental Status: He is alert and oriented to person, place, and time.      Comments: Fairchild-Nenita 5.07 monofilamant testing is diminished Kenji feet. Sharp/dull sensation diminished Bilaterally. Light touch absent Bilaterally.       Psychiatric:         Behavior: Behavior normal.       Vitals:    06/23/23 0810   BP: (!) 157/73   Pulse: 84   Temp: 98.2 °F (36.8 °C)       Assessment:           ICD-10-CM ICD-9-CM   1. Diabetic ulcer of left foot  E11.621 250.80    L97.529 707.15            Altered Skin Integrity 05/05/23 1108 Left distal;lateral;plantar Foot Blister(s) (Active)   05/05/23 1108   Altered Skin Integrity Present on Admission - Did Patient arrive to the hospital with altered skin?: yes   Side: Left   Orientation: distal;lateral;plantar   Location: Foot   Wound Number:    Is this injury device related?: No   Primary Wound Type: Blister(s)   Description of Altered Skin Integrity:    Ankle-Brachial Index:    Pulses:    Removal Indication and Assessment:    Wound Outcome:    (Retired) Wound Length (cm):    (Retired) Wound Width (cm):    (Retired) Depth (cm):    Wound Description (Comments):    Removal Indications:    Wound Image   06/23/23 0823   Description of Altered Skin Integrity Partial thickness tissue loss. Shallow open ulcer with a red or pink wound bed, without slough. Intact or Open/Ruptured Serum-filled blister. 06/23/23 0823   Dressing Appearance Dry;Intact 06/23/23 0823   Drainage Amount None 06/23/23 0823   Appearance Pink 06/23/23 0823   Tissue loss description Partial thickness 06/23/23 0823   Red (%), Wound Tissue Color 100 % 06/23/23 0823   Periwound Area Intact;Dry 06/23/23 0823   Wound Edges Defined 06/23/23 0823   Wound Length (cm) 0.3 cm 06/23/23 0823   Wound Width (cm) 0.3 cm 06/23/23 0823   Wound Depth (cm) 0.1 cm 06/23/23 0823   Wound Volume (cm^3) 0.009 cm^3 06/23/23 0823   Wound Surface Area (cm^2) 0.09 cm^2 06/23/23 0823   Care Cleansed with:;Antimicrobial agent;Sterile normal  saline 06/23/23 0823   Dressing Changed 06/23/23 0823   Off Loading Football dressing;Off loading shoe 06/23/23 0823   Dressing Change Due 06/30/23 06/23/23 0823            (Retired) Wound 04/08/22 1137 Diabetic Ulcer Left plantar;lateral Foot (Active)   04/08/22 1137    Pre-existing: Yes   Primary Wound Type: Diabetic ulc   Side: Left   Orientation: plantar;lateral   Location: Foot   Wound Number:    Ankle-Brachial Index:    Pulses:    Removal Indication and Assessment:    Wound Outcome:    (Retired) Wound Type:    (Retired) Wound Length (cm):    (Retired) Wound Width (cm):    (Retired) Depth (cm):    Wound Description (Comments):    Removal Indications:    Wound Image   06/23/23 0823   Wound WDL ex 06/23/23 0823   Dressing Appearance Dry;Intact 06/23/23 0823   Appearance Pink 06/23/23 0823   Tissue loss description Partial thickness 06/23/23 0823   Red (%), Wound Tissue Color 100 % 06/23/23 0823   Periwound Area Intact;Dry 06/23/23 0823   Wound Edges Defined 06/23/23 0823   Wound Length (cm) 1.3 cm 06/23/23 0823   Wound Width (cm) 1.2 cm 06/23/23 0823   Wound Depth (cm) 0.3 cm 06/23/23 0823   Wound Volume (cm^3) 0.468 cm^3 06/23/23 0823   Wound Surface Area (cm^2) 1.56 cm^2 06/23/23 0823   Care Cleansed with:;Antimicrobial agent;Sterile normal saline 06/23/23 0823   Dressing Changed 06/23/23 0823   Off Loading Off loading shoe;Football dressing 06/23/23 0823   Dressing Change Due 06/30/23 06/23/23 0823           Plan:                Education about the prevention of limb loss.    Discussed wound healing cycle, skin integrity, ways to care for skin.Counseled patient on the effects of biomechanical pressure, PVD and blood glucose on healing. He verbalizes understanding that it can increase the chances of delayed healing and this prolonged exposure leads to infection or progression of infection which subsequently can result in loss of limb.    Wound beds pink and periwound dry Distal wound 1 mm smaller in length and  [id number] smaller in length.     Wound care done as per order. Return to clinic  for a Nurse Visit and then 1 week to see MD. Patient declined AVS.    I custom cut and personally applied an offloading pad to be applied to the foot due to deformity, body habitus, activity level    Refused printed AVS    Short-term goals include maintaining good offloading and minimizing bioburden, promoting granulation and epithelialization to healing.  Long-term goals include keeping the wound healed by good offloading and medical management under the direction of internist.    Debridement    Date/Time: 6/23/2023 7:57 AM  Performed by: Marivel Peterson DPM  Authorized by: Marivel Peterson DPM       Wound Details:    Location:  Left foot    Location:  Left Midfoot    Type of Debridement:  Excisional       Length (cm):  1.3       Area (sq cm):  1.56       Width (cm):  1.2       Percent Debrided (%):  100       Depth (cm):  0.3       Total Area Debrided (sq cm):  1.56    Depth of debridement:  Subcutaneous tissue    Tissue debrided:  Dermis, Epidermis and Subcutaneous    Devitalized tissue debrided:  Fibrin and Callus    Instruments:  Curette  Bleeding:  Minimal  Hemostasis Achieved: Yes  Method Used:  Pressure  Patient tolerance:  Patient tolerated the procedure well with no immediate complications      Tissue pathology and/or culture taken     [] Yes      [x] No  Sharp debridement performed                   [x] Yes       [] No  Labs ordered     [] Yes       [x] No  Imaging ordered    [] Yes      [x] No    Orders Placed This Encounter   Procedures    Debridement     This order was created via procedure documentation     Standing Status:   Standing     Number of Occurrences:   1    Change dressing     Remove old dressing   Cleanse or irrigate with: Normal Saline   Left Foot   Protect periwound with:  Gentian Violet hugging wound bed, Cavilon/Benzoin to Plantar Foot where Custom Pad will be placed   Primary dressing: Cellerate Rx (per MD)  "to wound bed covered by UrgoTul Ag then Melgisorb AG, Custom Felt Pad (with large holes cut to allow for drainage)    Mextra (long 5" x 9"). Abram foam (long x2, laid perpendicular).   Compression and Off-load: Football dressing (cast padding x 3). Tubigrip double layer, size E folded over foot like sock and secured with Medipore Tape to both legs. CAM boot.     RLE: Cavilon then U-pad to plantar around bony prominence Teardrop to plantar heel. Tubigrip double  layer, size E        Follow up in about 1 week (around 6/30/2023) for nurse visit.       "

## 2023-06-23 NOTE — PROGRESS NOTES
[facility]                Progress Note    Subjective:       Patient ID: Indio Ryder Jr. is a 52 y.o. male.    Chief Complaint: Wound Care    Walked to clinic wearing appropriate footware Wound beds pink and periwound dry Distal wound 1 mm smaller in length and [id number] smaller in length.    Review of Systems      Objective:        Physical Exam    There were no vitals filed for this visit.    Assessment:         No diagnosis found.         Altered Skin Integrity 05/05/23 1108 Left distal;lateral;plantar Foot Blister(s) (Active)   05/05/23 1108   Altered Skin Integrity Present on Admission - Did Patient arrive to the hospital with altered skin?: yes   Side: Left   Orientation: distal;lateral;plantar   Location: Foot   Wound Number:    Is this injury device related?: No   Primary Wound Type: Blister(s)   Description of Altered Skin Integrity:    Ankle-Brachial Index:    Pulses:    Removal Indication and Assessment:    Wound Outcome:    (Retired) Wound Length (cm):    (Retired) Wound Width (cm):    (Retired) Depth (cm):    Wound Description (Comments):    Removal Indications:    Wound Image   06/23/23 0823   Description of Altered Skin Integrity Partial thickness tissue loss. Shallow open ulcer with a red or pink wound bed, without slough. Intact or Open/Ruptured Serum-filled blister. 06/23/23 0823   Dressing Appearance Dry;Intact 06/23/23 0823   Drainage Amount None 06/23/23 0823   Appearance Pink 06/23/23 0823   Tissue loss description Partial thickness 06/23/23 0823   Red (%), Wound Tissue Color 100 % 06/23/23 0823   Periwound Area Intact;Dry 06/23/23 0823   Wound Edges Defined 06/23/23 0823   Wound Length (cm) 0.3 cm 06/23/23 0823   Wound Width (cm) 0.3 cm 06/23/23 0823   Wound Depth (cm) 0.1 cm 06/23/23 0823   Wound Volume (cm^3) 0.009 cm^3 06/23/23 0823   Wound Surface Area (cm^2) 0.09 cm^2 06/23/23 0823   Care Cleansed with:;Antimicrobial agent;Sterile  normal saline 06/23/23 0823   Dressing Changed 06/23/23 0823   Off Loading Football dressing;Off loading shoe 06/23/23 0823   Dressing Change Due 06/30/23 06/23/23 0823            (Retired) Wound 04/08/22 1137 Diabetic Ulcer Left plantar;lateral Foot (Active)   04/08/22 1137    Pre-existing: Yes   Primary Wound Type: Diabetic ulc   Side: Left   Orientation: plantar;lateral   Location: Foot   Wound Number:    Ankle-Brachial Index:    Pulses:    Removal Indication and Assessment:    Wound Outcome:    (Retired) Wound Type:    (Retired) Wound Length (cm):    (Retired) Wound Width (cm):    (Retired) Depth (cm):    Wound Description (Comments):    Removal Indications:    Wound Image   06/23/23 0823   Wound WDL ex 06/23/23 0823   Dressing Appearance Dry;Intact 06/23/23 0823   Appearance Pink 06/23/23 0823   Tissue loss description Partial thickness 06/23/23 0823   Red (%), Wound Tissue Color 100 % 06/23/23 0823   Periwound Area Intact;Dry 06/23/23 0823   Wound Edges Defined 06/23/23 0823   Wound Length (cm) 1.3 cm 06/23/23 0823   Wound Width (cm) 1.2 cm 06/23/23 0823   Wound Depth (cm) 0.3 cm 06/23/23 0823   Wound Volume (cm^3) 0.468 cm^3 06/23/23 0823   Wound Surface Area (cm^2) 1.56 cm^2 06/23/23 0823   Care Cleansed with:;Antimicrobial agent;Sterile normal saline 06/23/23 0823   Dressing Changed 06/23/23 0823   Off Loading Off loading shoe;Football dressing 06/23/23 0823   Dressing Change Due 06/30/23 06/23/23 0823           Plan:              Tissue pathology and/or culture taken     [] Yes      [x] No  Sharp debridement performed                   [x] Yes       [] No  Labs ordered     [] Yes       [x] No  Imaging ordered    [] Yes      [x] No    No orders of the defined types were placed in this encounter.       No follow-ups on file.

## 2023-06-26 ENCOUNTER — HOSPITAL ENCOUNTER (OUTPATIENT)
Dept: WOUND CARE | Facility: HOSPITAL | Age: 53
Discharge: HOME | End: 2023-06-26
Attending: INTERNAL MEDICINE
Payer: MEDICARE

## 2023-06-26 VITALS — HEART RATE: 109 BPM | SYSTOLIC BLOOD PRESSURE: 115 MMHG | TEMPERATURE: 98 F | DIASTOLIC BLOOD PRESSURE: 60 MMHG

## 2023-06-26 DIAGNOSIS — E11.621 DIABETIC ULCER OF LEFT FOOT: Primary | ICD-10-CM

## 2023-06-26 DIAGNOSIS — L97.529 DIABETIC ULCER OF LEFT FOOT: Primary | ICD-10-CM

## 2023-06-26 PROCEDURE — 99212 OFFICE O/P EST SF 10 MIN: CPT

## 2023-06-26 NOTE — PROGRESS NOTES
[facility]  [address]  [id number]  Nurse Visit    Subjective:       Patient seen in clinic today. Patient ambulated to exam room with own tennis shoe with lift on Right Foot and CAM boot on LLE. No c/o pain at present. Denies fever, chills or flu-like symptoms. Dressing to Left Foot is intact with a moderate amount of serosanguineous, non-malodor drainage that cause strike through to outer Tubigrip layer. Dressing changed as ordered.      Assessment:          Altered Skin Integrity 05/05/23 1108 Left distal;lateral;plantar Foot Blister(s) (Active)   05/05/23 1108   Altered Skin Integrity Present on Admission - Did Patient arrive to the hospital with altered skin?: yes   Side: Left   Orientation: distal;lateral;plantar   Location: Foot   Wound Number:    Is this injury device related?: No   Primary Wound Type: Blister(s)   Description of Altered Skin Integrity:    Ankle-Brachial Index:    Pulses:    Removal Indication and Assessment:    Wound Outcome:    (Retired) Wound Length (cm):    (Retired) Wound Width (cm):    (Retired) Depth (cm):    Wound Description (Comments):    Removal Indications:    Description of Altered Skin Integrity Partial thickness tissue loss. Shallow open ulcer with a red or pink wound bed, without slough. Intact or Open/Ruptured Serum-filled blister. 06/26/23 1443   Dressing Appearance Intact;Moist drainage 06/26/23 1443   Drainage Amount Small 06/26/23 1443   Drainage Characteristics/Odor Serosanguineous 06/26/23 1443   Appearance Red;Moist 06/26/23 1443   Tissue loss description Partial thickness 06/26/23 1443   Black (%), Wound Tissue Color 0 % 06/26/23 1443   Red (%), Wound Tissue Color 100 % 06/26/23 1443   Yellow (%), Wound Tissue Color 0 % 06/26/23 1443   Periwound Area Pale white;Macerated 06/26/23 1443   Wound Edges Defined;Open 06/26/23 1443   Care Cleansed with:;Antimicrobial agent;Sterile normal saline 06/26/23 1443   Dressing Changed  06/26/23 1443   Periwound Care Moisture barrier applied;Skin barrier film applied 06/26/23 1443   Compression Tubular elasticized bandage 06/26/23 1443   Off Loading Football dressing;Off loading shoe 06/26/23 1443   Dressing Change Due 06/30/23 06/26/23 1443            (Retired) Wound 04/08/22 1137 Diabetic Ulcer Left plantar;lateral Foot (Active)   04/08/22 1137    Pre-existing: Yes   Primary Wound Type: Diabetic ulc   Side: Left   Orientation: plantar;lateral   Location: Foot   Wound Number:    Ankle-Brachial Index:    Pulses:    Removal Indication and Assessment:    Wound Outcome:    (Retired) Wound Type:    (Retired) Wound Length (cm):    (Retired) Wound Width (cm):    (Retired) Depth (cm):    Wound Description (Comments):    Removal Indications:    Wound WDL ex 06/26/23 1443   Dressing Appearance Intact;Moist drainage;Area marked 06/26/23 1443   Drainage Amount Large 06/26/23 1443   Drainage Characteristics/Odor Serosanguineous 06/26/23 1443   Appearance Red;Moist 06/26/23 1443   Tissue loss description Full thickness 06/26/23 1443   Black (%), Wound Tissue Color 0 % 06/26/23 1443   Red (%), Wound Tissue Color 100 % 06/26/23 1443   Yellow (%), Wound Tissue Color 0 % 06/26/23 1443   Periwound Area Pale white;Moist;Macerated 06/26/23 1443   Wound Edges Defined;Open 06/26/23 1443   Care Cleansed with:;Antimicrobial agent;Sterile normal saline 06/26/23 1443   Dressing Changed 06/26/23 1443   Periwound Care Moisture barrier applied;Skin barrier film applied 06/26/23 1443   Compression Tubular elasticized bandage 06/26/23 1443   Off Loading Football dressing;Off loading shoe 06/26/23 1443   Dressing Change Due 06/30/23 06/26/23 1443           Plan:     Remove old dressing   Cleanse or irrigate with: Normal Saline   Left Foot   Protect periwound with:  Gentian Violet hugging wound bed, Cavilon/Benzoin to Plantar Foot where Custom Pad will be placed   Primary dressing: Cellerate Rx (per MD) to wound bed covered by  "UrgoTul Ag then Melgisorb AG, Custom Felt Pad (with large holes cut to allow for drainage)    Mextra (long 5" x 9"). Abram foam (long x2, laid perpendicular).   Compression and Off-load: Football dressing (cast padding x 3). Tubigrip double layer, size E folded over foot like sock and secured with Medipore Tape. CAM boot.           Follow up in about 4 days (around 6/30/2023) for wound care.          "

## 2023-06-30 ENCOUNTER — HOSPITAL ENCOUNTER (OUTPATIENT)
Dept: WOUND CARE | Facility: HOSPITAL | Age: 53
Discharge: HOME | End: 2023-06-30
Attending: PODIATRIST
Payer: MEDICARE

## 2023-06-30 VITALS — TEMPERATURE: 99 F | HEART RATE: 95 BPM | SYSTOLIC BLOOD PRESSURE: 177 MMHG | DIASTOLIC BLOOD PRESSURE: 89 MMHG

## 2023-06-30 DIAGNOSIS — L84 PRE-ULCERATIVE CALLUSES: ICD-10-CM

## 2023-06-30 DIAGNOSIS — L97.422 DIABETIC ULCER OF LEFT MIDFOOT ASSOCIATED WITH TYPE 2 DIABETES MELLITUS, WITH FAT LAYER EXPOSED: Primary | ICD-10-CM

## 2023-06-30 DIAGNOSIS — E11.621 DIABETIC ULCER OF LEFT MIDFOOT ASSOCIATED WITH TYPE 2 DIABETES MELLITUS, WITH FAT LAYER EXPOSED: Primary | ICD-10-CM

## 2023-06-30 PROCEDURE — 11042 DBRDMT SUBQ TIS 1ST 20SQCM/<: CPT | Mod: ,,, | Performed by: PODIATRIST

## 2023-06-30 PROCEDURE — 11042 DBRDMT SUBQ TIS 1ST 20SQCM/<: CPT | Performed by: PODIATRIST

## 2023-06-30 PROCEDURE — 11042 DEBRIDEMENT: ICD-10-PCS | Mod: ,,, | Performed by: PODIATRIST

## 2023-06-30 RX ORDER — AMOXICILLIN 875 MG/1
875 TABLET, FILM COATED ORAL EVERY 12 HOURS
Qty: 20 TABLET | Refills: 0 | Status: SHIPPED | OUTPATIENT
Start: 2023-06-30 | End: 2023-07-14 | Stop reason: ALTCHOICE

## 2023-06-30 RX ORDER — CIPROFLOXACIN 500 MG/1
500 TABLET ORAL 2 TIMES DAILY
Qty: 20 TABLET | Refills: 0 | Status: SHIPPED | OUTPATIENT
Start: 2023-06-30 | End: 2023-07-10

## 2023-06-30 NOTE — PROGRESS NOTES
[facility]                Progress Note    Subjective:       Patient ID: Indio Ryder Jr. is a 52 y.o. male.    Chief Complaint: Wound Care    Follow up for chronic left foot wound. Patient ambulated to exam room with own tennis shoe with lift on Right Foot and CAM boot on LLE. No c/o pain at present. Denies fever, chills or flu-like symptoms. Dressing to Left Foot is intact with a large amount of serosanguineous, non-malodor drainage. 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   HENT:  Negative for hearing loss.    Eyes:  Negative for photophobia and visual disturbance.   Respiratory:  Negative for cough, chest tightness, shortness of breath and wheezing.    Cardiovascular:  Positive for leg swelling. Negative for chest pain and palpitations.   Gastrointestinal:  Negative for constipation, diarrhea, nausea and vomiting.   Endocrine: Negative for cold intolerance and heat intolerance.   Genitourinary:  Negative for flank pain.   Musculoskeletal:  Positive for gait problem and myalgias. Negative for neck pain and neck stiffness.   Skin:  Positive for wound. Negative for color change.   Neurological:  Positive for numbness. Negative for weakness, light-headedness and headaches.        + paresthesia    Psychiatric/Behavioral:  Negative for sleep disturbance.        Objective:        Physical Exam  Constitutional:       Appearance: He is well-developed.   Cardiovascular:      Comments: Dorsalis pedis and posterior tibial pulses are palpable Skin is atrophic, slightly hyperpigmented, and mildly edematous      Musculoskeletal:         General: No tenderness. Normal range of motion.      Comments: Muscle strength is 5/5 in all groups bilaterally.    S/p partial 5th ray amp  b/l    S/p hallux amp right    Fat pad atrophy to heels and met heads bilateral       Skin:     General: Skin is warm and dry.      Coloration: Skin is not jaundiced, mottled or sallow.      Findings: Wound (see below) present. No abscess or ecchymosis.      Comments:        Neurological:      Mental Status: He is alert and oriented to person, place, and time.      Comments: [place]-Nenita 5.07 monofilamant testing is diminished Kenji feet. Sharp/dull sensation diminished Bilaterally. Light touch absent Bilaterally.       Psychiatric:         Behavior: Behavior normal.       Vitals:    06/30/23 1007   BP: (!) 177/89   Pulse: 95   Temp: 98.7 °F (37.1 °C)       Assessment:           ICD-10-CM ICD-9-CM   1. Diabetic ulcer of left midfoot associated with type 2 diabetes mellitus, with fat layer exposed  E11.621 250.80    L97.422 707.14   2. Pre-ulcerative calluses  L84 700            Altered Skin Integrity 05/05/23 1108 Left distal;lateral;plantar Foot Blister(s) (Active)   05/05/23 1108   Altered Skin Integrity Present on Admission - Did Patient arrive to the hospital with altered skin?: yes   Side: Left   Orientation: distal;lateral;plantar   Location: Foot   Wound Number:    Is this injury device related?: No   Primary Wound Type: Blister(s)   Description of Altered Skin Integrity:    Ankle-Brachial Index:    Pulses:    Removal Indication and Assessment:    Wound Outcome:    (Retired) Wound Length (cm):    (Retired) Wound Width (cm):    (Retired) Depth (cm):    Wound Description (Comments):    Removal Indications:    Wound Image    06/30/23 1331   Dressing Appearance Intact;Moist drainage 06/30/23 1331   Drainage Amount Large 06/30/23 1331   Drainage Characteristics/Odor Serosanguineous;No odor 06/30/23 1331   Appearance Red;Moist 06/30/23 1331   Black (%), Wound Tissue Color 0 % 06/30/23 1331   Red (%), Wound Tissue Color 100 % 06/30/23 1331   Yellow (%), Wound Tissue Color 0 % 06/30/23 1331   Periwound Area  Moist;Pale white;Macerated 06/30/23 1331   Wound Edges Defined;Open 06/30/23 1331   Wound Length (cm) 0.6 cm 06/30/23 1331   Wound Width (cm) 0.6 cm 06/30/23 1331   Wound Depth (cm) 0.1 cm 06/30/23 1331   Wound Volume (cm^3) 0.036 cm^3 06/30/23 1331   Wound Surface Area (cm^2) 0.36 cm^2 06/30/23 1331   Tunneling (depth (cm)/location) 0 06/30/23 1331   Undermining (depth (cm)/location) 0 06/30/23 1331   Care Cleansed with:;Antimicrobial agent;Sterile normal saline 06/30/23 1331   Dressing Changed 06/30/23 1331   Periwound Care Moisture barrier applied 06/30/23 1331   Off Loading Football dressing;Off loading shoe 06/30/23 1331   Dressing Change Due 07/05/23 06/30/23 1331            (Retired) Wound 04/08/22 1137 Diabetic Ulcer Left plantar;lateral Foot (Active)   04/08/22 1137    Pre-existing: Yes   Primary Wound Type: Diabetic ulc   Side: Left   Orientation: plantar;lateral   Location: Foot   Wound Number:    Ankle-Brachial Index:    Pulses:    Removal Indication and Assessment:    Wound Outcome:    (Retired) Wound Type:    (Retired) Wound Length (cm):    (Retired) Wound Width (cm):    (Retired) Depth (cm):    Wound Description (Comments):    Removal Indications:    Wound Image    06/30/23 1331   Dressing Appearance Intact;Moist drainage 06/30/23 1331   Drainage Amount Large 06/30/23 1331   Drainage Characteristics/Odor Serosanguineous;No odor 06/30/23 1331   Appearance Red;Wet 06/30/23 1331   Black (%), Wound Tissue Color 0 % 06/30/23 1331   Red (%), Wound Tissue Color 100 % 06/30/23 1331   Yellow (%), Wound Tissue Color 0 % 06/30/23 1331   Periwound Area Pale white;Moist;Macerated 06/30/23 1331   Wound Edges Defined;Open 06/30/23 1331   Wound Length (cm) 1.8 cm 06/30/23 1331   Wound Width (cm) 1.2 cm 06/30/23 1331   Wound Depth (cm) 0.2 cm 06/30/23 1331   Wound Volume (cm^3) 0.432 cm^3 06/30/23 1331   Wound Surface Area (cm^2) 2.16 cm^2 06/30/23 1331   Tunneling (depth (cm)/location) 0 06/30/23 1331   Undermining  (depth (cm)/location) 0 06/30/23 1331   Care Cleansed with:;Antimicrobial agent;Sterile normal saline 06/30/23 1331   Dressing Changed 06/30/23 1331   Periwound Care Moisture barrier applied 06/30/23 1331   Off Loading Football dressing;Off loading shoe 06/30/23 1331   Dressing Change Due 07/05/23 06/30/23 1331           Plan:                Education about the prevention of limb loss.    Discussed wound healing cycle, skin integrity, ways to care for skin.Counseled patient on the effects of biomechanical pressure, PVD and blood glucose on healing. He verbalizes understanding that it can increase the chances of delayed healing and this prolonged exposure leads to infection or progression of infection which subsequently can result in loss of limb.    Left Plantar Foot wounds are measuring larger as skin is wet and boggy from hotter temperatures. Will change to breathable football this week.     Plan for Neox placement on next encounter.     Wound care done as per order. Return to clinic  for a Nurse Visit and then 1 week to see MD. Patient declined AVS.    Refused printed AVS    Short-term goals include maintaining good offloading and minimizing bioburden, promoting granulation and epithelialization to healing.  Long-term goals include keeping the wound healed by good offloading and medical management under the direction of internist.    Debridement    Date/Time: 6/30/2023 10:00 AM  Performed by: Marivel Peterson DPM  Authorized by: Marivel Peterson DPM       Wound Details:    Location:  Left foot    Location:  Left Plantar    Type of Debridement:  Excisional       Length (cm):  0.6       Area (sq cm):  0.36       Width (cm):  0.6       Percent Debrided (%):  100       Depth (cm):  0.1       Total Area Debrided (sq cm):  0.36    Depth of debridement:  Subcutaneous tissue    Tissue debrided:  Dermis and Epidermis    Devitalized tissue debrided:  Callus and Fibrin    Instruments:  Curette  Bleeding:  Minimal  Hemostasis  "Achieved: Yes  Method Used:  Pressure    Additional wounds:  1    2nd Wound Details:     Location:  Left foot    Location:  Left Plantar    Location:  Left Plantar    Type of Debridement:  Excisional       Length (cm):  1.8       Area (sq cm):  2.16       Width (cm):  1.2       Percent Debrided (%):  100       Depth (cm):  0.2       Total Area Debrided (sq cm):  2.16    Depth of debridement:  Subcutaneous tissue    Tissue debrided:  Epidermis, Subcutaneous and Dermis    Devitalized tissue debrided:  Callus and Fibrin    Instruments:  Scissors and Curette  Bleeding:  Minimal  Patient tolerance:  Patient tolerated the procedure well with no immediate complications      Tissue pathology and/or culture taken     [] Yes      [x] No  Sharp debridement performed                   [x] Yes       [] No  Labs ordered     [] Yes       [x] No  Imaging ordered    [] Yes      [x] No    Orders Placed This Encounter   Procedures    Debridement     This order was created via procedure documentation     Standing Status:   Standing     Number of Occurrences:   1    Change dressing     Remove old dressing     Cleanse or irrigate with: Normal Saline     Left Foot   Protect periwound with:  Gentian Violet hugging wound bed, Cavilon/Benzoin to Plantar Foot where Custom Pad will be placed   Primary dressing: [place] to wound bed then Kaltostat (alginate), Custom Felt Pad (with large holes cut to allow for drainage), Drawtex (cut to fit) then Mextra (long 5" x 9"). Abram foam (long x2, laid perpendicular).   Compression and Off-load: Football dressing (cast padding x 3). Stockinet size 6 to secure. CAM boot.     RLE: Cavilon then U-pad to plantar around bony prominence Teardrop to plantar heel. Tubigrip, single layer, size E.        Follow up in about 5 days (around 7/5/2023) for wound care.       "

## 2023-07-05 ENCOUNTER — HOSPITAL ENCOUNTER (OUTPATIENT)
Dept: WOUND CARE | Facility: HOSPITAL | Age: 53
Discharge: HOME | End: 2023-07-05
Attending: FAMILY MEDICINE
Payer: MEDICARE

## 2023-07-05 VITALS — TEMPERATURE: 98 F | HEART RATE: 80 BPM | DIASTOLIC BLOOD PRESSURE: 70 MMHG | SYSTOLIC BLOOD PRESSURE: 142 MMHG

## 2023-07-05 DIAGNOSIS — L97.422 DIABETIC ULCER OF LEFT MIDFOOT ASSOCIATED WITH TYPE 2 DIABETES MELLITUS, WITH FAT LAYER EXPOSED: Primary | ICD-10-CM

## 2023-07-05 DIAGNOSIS — E11.621 DIABETIC ULCER OF LEFT MIDFOOT ASSOCIATED WITH TYPE 2 DIABETES MELLITUS, WITH FAT LAYER EXPOSED: Primary | ICD-10-CM

## 2023-07-05 PROCEDURE — 99213 OFFICE O/P EST LOW 20 MIN: CPT

## 2023-07-05 PROCEDURE — 99212 OFFICE O/P EST SF 10 MIN: CPT

## 2023-07-05 NOTE — PROGRESS NOTES
[facility] [address]  [address]  Nurse Visit    Subjective:       Patient seen in clinic today. Patient ambulated to exam room with own tennis shoe with lift on Right Foot and CAM boot on LLE. No c/o pain to wound beds at present. Denies fever, chills or flu-like symptoms. Dressing to Left Foot is intact with a large amount of serosanguineous, non-malodor drainage. Dressing changed as ordered.      Assessment:          Altered Skin Integrity 05/05/23 1108 Left distal;lateral;plantar Foot Blister(s) (Active)   05/05/23 1108   Altered Skin Integrity Present on Admission - Did Patient arrive to the hospital with altered skin?: yes   Side: Left   Orientation: distal;lateral;plantar   Location: Foot   Wound Number:    Is this injury device related?: No   Primary Wound Type: Blister(s)   Description of Altered Skin Integrity:    Ankle-Brachial Index:    Pulses:    Removal Indication and Assessment:    Wound Outcome:    (Retired) Wound Length (cm):    (Retired) Wound Width (cm):    (Retired) Depth (cm):    Wound Description (Comments):    Removal Indications:    Dressing Appearance Intact;Moist drainage 07/05/23 1621   Drainage Amount Large 07/05/23 1621   Drainage Characteristics/Odor Serosanguineous;No odor 07/05/23 1621   Appearance Red;Moist 07/05/23 1621   Black (%), Wound Tissue Color 0 % 07/05/23 1621   Red (%), Wound Tissue Color 100 % 07/05/23 1621   Yellow (%), Wound Tissue Color 0 % 07/05/23 1621   Periwound Area Pale white;Moist;Macerated 07/05/23 1621   Wound Edges Defined;Open 07/05/23 1621   Care Cleansed with:;Antimicrobial agent;Sterile normal saline 07/05/23 1621   Dressing Changed 07/05/23 1621   Periwound Care Moisture barrier applied 07/05/23 1621   Off Loading Football dressing;Off loading shoe 07/05/23 1621   Dressing Change Due 07/07/23 07/05/23 1621            (Retired) Wound 04/08/22 1137 Diabetic Ulcer Left plantar;lateral Foot (Active)   04/08/22 1137  "   Pre-existing: Yes   Primary Wound Type: Diabetic ulc   Side: Left   Orientation: plantar;lateral   Location: Foot   Wound Number:    Ankle-Brachial Index:    Pulses:    Removal Indication and Assessment:    Wound Outcome:    (Retired) Wound Type:    (Retired) Wound Length (cm):    (Retired) Wound Width (cm):    (Retired) Depth (cm):    Wound Description (Comments):    Removal Indications:    Wound WDL ex 07/05/23 1621   Dressing Appearance Intact;Moist drainage 07/05/23 1621   Drainage Amount Large 07/05/23 1621   Drainage Characteristics/Odor Serosanguineous;No odor 07/05/23 1621   Appearance Red;Moist 07/05/23 1621   Black (%), Wound Tissue Color 0 % 07/05/23 1621   Red (%), Wound Tissue Color 100 % 07/05/23 1621   Yellow (%), Wound Tissue Color 0 % 07/05/23 1621   Periwound Area Pale white;Moist;Macerated 07/05/23 1621   Wound Edges Defined;Open 07/05/23 1621   Care Cleansed with:;Antimicrobial agent;Sterile normal saline 07/05/23 1621   Dressing Changed 07/05/23 1621   Periwound Care Moisture barrier applied 07/05/23 1621   Off Loading Football dressing;Off loading shoe 07/05/23 1621   Dressing Change Due 07/07/23 07/05/23 1621           Plan:     Remove old dressing (Left Foot)     Cleanse or irrigate with: Normal Saline     Protect periwound with:  Gentian Violet hugging wound bed, Cavilon/Benzoin to Plantar Foot where Custom Pad will be placed   Primary dressing: [place] to wound bed then Kaltostat (alginate), Custom Felt Pad (with large holes cut to allow for drainage), Drawtex (cut to fit) then Mextra (long 5" x 9"). Abram foam (long x2, laid perpendicular).   Compression and Off-load: Football dressing (cast padding x 3). Stockinet size 6 to secure. CAM boot.        Follow up in about 2 days (around 7/7/2023) for wound care.        "

## 2023-07-07 ENCOUNTER — HOSPITAL ENCOUNTER (OUTPATIENT)
Dept: WOUND CARE | Facility: HOSPITAL | Age: 53
Discharge: HOME | End: 2023-07-07
Attending: PODIATRIST
Payer: MEDICARE

## 2023-07-07 VITALS — HEART RATE: 92 BPM | SYSTOLIC BLOOD PRESSURE: 164 MMHG | TEMPERATURE: 98 F | DIASTOLIC BLOOD PRESSURE: 79 MMHG

## 2023-07-07 DIAGNOSIS — L97.422 DIABETIC ULCER OF LEFT MIDFOOT ASSOCIATED WITH TYPE 2 DIABETES MELLITUS, WITH FAT LAYER EXPOSED: Primary | ICD-10-CM

## 2023-07-07 DIAGNOSIS — E11.621 DIABETIC ULCER OF LEFT MIDFOOT ASSOCIATED WITH TYPE 2 DIABETES MELLITUS, WITH FAT LAYER EXPOSED: Primary | ICD-10-CM

## 2023-07-07 PROCEDURE — 15275 SKIN SUB GRAFT FACE/NK/HF/G: CPT | Performed by: PODIATRIST

## 2023-07-07 PROCEDURE — 99499 UNLISTED E&M SERVICE: CPT | Mod: ,,, | Performed by: PODIATRIST

## 2023-07-07 PROCEDURE — 15271 SKIN SUB GRAFT TRNK/ARM/LEG: CPT | Performed by: PODIATRIST

## 2023-07-07 PROCEDURE — 99499 NO LOS: ICD-10-PCS | Mod: ,,, | Performed by: PODIATRIST

## 2023-07-07 NOTE — PROGRESS NOTES
[facility]                Progress Note    Subjective:       Patient ID: Indio Ryder Jr. is a 53 y.o. male.    Chief Complaint: Wound Check    Follow up wound care visit. Patient ambulated to exam room with own tennis shoe with lift on Right Foot and CAM boot on LLE. No c/o pain at present. Denies fever, chills or flu-like symptoms. Dressing to Left Foot is intact with a moderate amount of serosanguineous, non-malodor drainage and appears drier around wound beds than at nurse visit earlier in the week.     Wound Check    Review of Systems   Constitutional:  Negative for appetite change, chills, fatigue and fever.   HENT:  Negative for hearing loss.    Eyes:  Negative for photophobia and visual disturbance.   Respiratory:  Negative for cough, chest tightness, shortness of breath and wheezing.    Cardiovascular:  Positive for leg swelling. Negative for chest pain and palpitations.   Gastrointestinal:  Negative for constipation, diarrhea, nausea and vomiting.   Endocrine: Negative for cold intolerance and heat intolerance.   Genitourinary:  Negative for flank pain.   Musculoskeletal:  Positive for gait problem and myalgias. Negative for neck pain and neck stiffness.   Skin:  Positive for wound. Negative for color change.   Neurological:  Positive for numbness. Negative for weakness, light-headedness and headaches.        + paresthesia    Psychiatric/Behavioral:  Negative for sleep disturbance.        Objective:        Physical Exam  Constitutional:       Appearance: He is well-developed.   Cardiovascular:      Comments: Dorsalis pedis and posterior tibial pulses are palpable Skin is atrophic, slightly hyperpigmented, and mildly edematous      Musculoskeletal:         General: No tenderness. Normal range of motion.      Comments: Muscle strength is 5/5 in all groups bilaterally.    S/p partial 5th ray amp b/l    S/p hallux amp right    Fat pad atrophy to heels and met  heads bilateral       Skin:     General: Skin is warm and dry.      Coloration: Skin is not jaundiced, mottled or sallow.      Findings: Wound (see below) present. No abscess or ecchymosis.      Comments:        Neurological:      Mental Status: He is alert and oriented to person, place, and time.      Comments: [place]ane-Nenita 5.07 monofilamant testing is diminished Kenji feet. Sharp/dull sensation diminished Bilaterally. Light touch absent Bilaterally.       Psychiatric:         Behavior: Behavior normal.       Vitals:    07/07/23 1132   BP: (!) 164/79   Pulse: 92   Temp: 97.5 °F (36.4 °C)       Assessment:           ICD-10-CM ICD-9-CM   1. Diabetic ulcer of left midfoot associated with type 2 diabetes mellitus, with fat layer exposed  E11.621 250.80    L97.422 707.14            Altered Skin Integrity 05/05/23 1108 Left distal;lateral;plantar Foot Blister(s) (Active)   05/05/23 1108   Altered Skin Integrity Present on Admission - Did Patient arrive to the hospital with altered skin?: yes   Side: Left   Orientation: distal;lateral;plantar   Location: Foot   Wound Number:    Is this injury device related?: No   Primary Wound Type: Blister(s)   Description of Altered Skin Integrity:    Ankle-Brachial Index:    Pulses:    Removal Indication and Assessment:    Wound Outcome:    (Retired) Wound Length (cm):    (Retired) Wound Width (cm):    (Retired) Depth (cm):    Wound Description (Comments):    Removal Indications:    Wound Image   07/07/23 1134   Dressing Appearance Intact;Moist drainage 07/07/23 1134   Drainage Amount Moderate 07/07/23 1134   Drainage Characteristics/Odor Serosanguineous;No odor 07/07/23 1134   Appearance Red;Moist 07/07/23 1134   Black (%), Wound Tissue Color 0 % 07/07/23 1134   Red (%), Wound Tissue Color 100 % 07/07/23 1134   Yellow (%), Wound Tissue Color 0 % 07/07/23 1134   Periwound Area Pale white 07/07/23 1134   Wound Edges Callused;Defined;Open 07/07/23 1134   Wound Length (cm) 1 cm  07/07/23 1134   Wound Width (cm) 1.3 cm 07/07/23 1134   Wound Depth (cm) 0.1 cm 07/07/23 1134   Wound Volume (cm^3) 0.13 cm^3 07/07/23 1134   Wound Surface Area (cm^2) 1.3 cm^2 07/07/23 1134   Tunneling (depth (cm)/location) 0 07/07/23 1134   Undermining (depth (cm)/location) 0 07/07/23 1134   Care Cleansed with:;Antimicrobial agent;Sterile normal saline 07/07/23 1134   Dressing Changed 07/07/23 1134   Periwound Care Moisture barrier applied 07/07/23 1134   Off Loading Football dressing;Off loading shoe 07/07/23 1134   Dressing Change Due 07/12/23 07/07/23 1134            (Retired) Wound 04/08/22 1137 Diabetic Ulcer Left plantar;lateral Foot (Active)   04/08/22 1137    Pre-existing: Yes   Primary Wound Type: Diabetic ulc   Side: Left   Orientation: plantar;lateral   Location: Foot   Wound Number:    Ankle-Brachial Index:    Pulses:    Removal Indication and Assessment:    Wound Outcome:    (Retired) Wound Type:    (Retired) Wound Length (cm):    (Retired) Wound Width (cm):    (Retired) Depth (cm):    Wound Description (Comments):    Removal Indications:    Wound Image   07/07/23 1134   Wound WDL ex 07/07/23 1134   Dressing Appearance Intact;Moist drainage 07/07/23 1134   Drainage Amount Moderate 07/07/23 1134   Drainage Characteristics/Odor Serosanguineous;No odor 07/07/23 1134   Appearance Red;Pink;Moist 07/07/23 1134   Black (%), Wound Tissue Color 0 % 07/07/23 1134   Red (%), Wound Tissue Color 100 % 07/07/23 1134   Yellow (%), Wound Tissue Color 0 % 07/07/23 1134   Periwound Area Pale white 07/07/23 1134   Wound Edges Callused;Defined;Open 07/07/23 1134   Wound Length (cm) 1.8 cm 07/07/23 1134   Wound Width (cm) 1.3 cm 07/07/23 1134   Wound Depth (cm) 0.2 cm 07/07/23 1134   Wound Volume (cm^3) 0.468 cm^3 07/07/23 1134   Wound Surface Area (cm^2) 2.34 cm^2 07/07/23 1134   Tunneling (depth (cm)/location) 0 07/07/23 1134   Undermining (depth (cm)/location) 0 07/07/23 1134   Care Cleansed with:;Antimicrobial  agent;Sterile normal saline 07/07/23 1134   Dressing Changed 07/07/23 1134   Periwound Care Moisture barrier applied 07/07/23 1134   Off Loading Football dressing;Off loading shoe 07/07/23 1134   Dressing Change Due 07/12/23 07/07/23 1134           Plan:                Education about the prevention of limb loss.    Discussed wound healing cycle, skin integrity, ways to care for skin.Counseled patient on the effects of biomechanical pressure, PVD and blood glucose on healing. He verbalizes understanding that it can increase the chances of delayed healing and this prolonged exposure leads to infection or progression of infection which subsequently can result in loss of limb.      Wound care done as per order. Return to clinic  for a Nurse Visit and then 1 week to see MD. Patient declined AVS.    Short-term goals include maintaining good offloading and minimizing bioburden, promoting granulation and epithelialization to healing.  Long-term goals include keeping the wound healed by good offloading and medical management under the direction of internist.    Skin substitute    Date/Time: 7/7/2023 10:59 AM  Performed by: Marivel Peterson DPM  Authorized by: Marivel Peterson DPM     Procedure details:     Product applied:  Neox 100 or clarix 100    Amount used (cm^2):  6    Amount wasted (cm^2):  0  Dressing:     Dressing: secured with prolene, cutimed, steristrips.    Wrapped with:  Bulky dressing  Post-procedure details:     Patient tolerance of procedure:  Tolerated well, no immediate complications      Tissue pathology and/or culture taken     [] Yes      [x] No  Sharp debridement performed                   [x] Yes       [] No  Labs ordered     [] Yes       [x] No  Imaging ordered    [] Yes      [x] No    Orders Placed This Encounter   Procedures    Skin substitute     This order was created via procedure documentation     Standing Status:   Standing     Number of Occurrences:   1    Change dressing     Remove old  dressing   Cleanse or irrigate with: Normal Saline     Left Foot   Iodine scrub for 2 minutes. Cavilon/Benzoin to periwound and plantar foot. Neox (sutured in), then Cutimed, secured with steristrips (per MD). Kaltostat double layer then Aquacel Extra sheet (fold twice). Mextra (long x 1, vertically). Abram foam (long x2, laid perpendicular). Football dressing (cast padding x 3). Stockinet size 6 to secure. CAM boot.     RLE: Cavilon then U-pad to plantar around bony prominence and Teardrop custom pad to plantar heel. Tubigrip, single layer, size E.    Change Left Foot dressing at Nurse Visit: Gently clean around Cutimed layer. Reapply Cutimed only if not adhered, okay to reinforce or reapply Steri-strips if needed. Then reapply Kaltostat double layer then Aquacel Extra sheet (fold twice). Mextra (long x 1, vertically). Abram foam (long x2, laid perpendicular). Football dressing (cast padding x 3). Stockinet size 6 to secure. CAM boot.        Follow up in about 5 days (around 7/12/2023) for wound care.

## 2023-07-12 ENCOUNTER — TELEPHONE (OUTPATIENT)
Dept: ADMINISTRATIVE | Facility: CLINIC | Age: 53
End: 2023-07-12
Payer: MEDICARE

## 2023-07-12 ENCOUNTER — HOSPITAL ENCOUNTER (OUTPATIENT)
Dept: WOUND CARE | Facility: HOSPITAL | Age: 53
Discharge: HOME | End: 2023-07-12
Attending: FAMILY MEDICINE
Payer: MEDICARE

## 2023-07-12 VITALS — DIASTOLIC BLOOD PRESSURE: 76 MMHG | SYSTOLIC BLOOD PRESSURE: 157 MMHG | TEMPERATURE: 98 F | HEART RATE: 99 BPM

## 2023-07-12 DIAGNOSIS — L97.422 DIABETIC ULCER OF LEFT MIDFOOT ASSOCIATED WITH TYPE 2 DIABETES MELLITUS, WITH FAT LAYER EXPOSED: Primary | ICD-10-CM

## 2023-07-12 DIAGNOSIS — E11.621 DIABETIC ULCER OF LEFT MIDFOOT ASSOCIATED WITH TYPE 2 DIABETES MELLITUS, WITH FAT LAYER EXPOSED: Primary | ICD-10-CM

## 2023-07-12 PROCEDURE — 99213 OFFICE O/P EST LOW 20 MIN: CPT

## 2023-07-12 NOTE — PROGRESS NOTES
[facility]  [address]  [address]  Nurse Visit    Subjective:       Patient seen in clinic today. Patient ambulated to exam room with own tennis shoe with lift on Right Foot and CAM boot on LLE. No c/o pain to wound beds at present. Denies fever, chills or flu-like symptoms. Dressing to Left Foot is intact with a large amount of serosanguineous. Dressing changed as ordered.      Assessment:          Altered Skin Integrity 05/05/23 1108 Left distal;lateral;plantar Foot Blister(s) (Active)   05/05/23 1108   Altered Skin Integrity Present on Admission - Did Patient arrive to the hospital with altered skin?: yes   Side: Left   Orientation: distal;lateral;plantar   Location: Foot   Wound Number:    Is this injury device related?: No   Primary Wound Type: Blister(s)   Description of Altered Skin Integrity:    Ankle-Brachial Index:    Pulses:    Removal Indication and Assessment:    Wound Outcome:    (Retired) Wound Length (cm):    (Retired) Wound Width (cm):    (Retired) Depth (cm):    Wound Description (Comments):    Removal Indications:    Dressing Appearance Intact;Moist drainage 07/12/23 1001   Drainage Amount Large 07/12/23 1001   Drainage Characteristics/Odor Serosanguineous;No odor 07/12/23 1001   Appearance Sutures intact 07/12/23 1001   Tissue loss description Partial thickness 07/12/23 1001   Periwound Area Macerated;Moist;Pale white 07/12/23 1001   Wound Edges Defined;Open 07/12/23 1001   Care Cleansed with:;Sterile normal saline 07/12/23 1001   Dressing Changed 07/12/23 1001   Periwound Care Moisture barrier applied;Skin barrier film applied 07/12/23 1001   Off Loading Football dressing;Off loading shoe 07/12/23 1001   Dressing Change Due 07/14/23 07/12/23 1001            (Retired) Wound 04/08/22 1137 Diabetic Ulcer Left plantar;lateral Foot (Active)   04/08/22 1137    Pre-existing: Yes   Primary Wound Type: Diabetic ulc   Side: Left   Orientation: plantar;lateral    Location: Foot   Wound Number:    Ankle-Brachial Index:    Pulses:    Removal Indication and Assessment:    Wound Outcome:    (Retired) Wound Type:    (Retired) Wound Length (cm):    (Retired) Wound Width (cm):    (Retired) Depth (cm):    Wound Description (Comments):    Removal Indications:    Wound WDL ex 07/12/23 1001   Dressing Appearance Intact;Moist drainage 07/12/23 1001   Drainage Amount Large 07/12/23 1001   Drainage Characteristics/Odor Serosanguineous;No odor 07/12/23 1001   Appearance Sutures intact 07/12/23 1001   Tissue loss description Full thickness 07/12/23 1001   Periwound Area Macerated;Moist;Pale white 07/12/23 1001   Wound Edges Defined;Open 07/12/23 1001   Care Cleansed with:;Sterile normal saline 07/12/23 1001   Dressing Changed 07/12/23 1001   Periwound Care Skin barrier film applied;Moisture barrier applied 07/12/23 1001   Off Loading Football dressing;Off loading shoe 07/12/23 1001   Dressing Change Due 07/14/23 07/12/23 1001           Plan:     Gently clean around Cutimed layer. Reapplied Cutimed and Steri-strips. Then reapply Kaltostat double layer then Aquacel Extra sheet (fold twice). Mextra (long x 1, vertically). Abram foam (long x2, laid perpendicular). Football dressing (cast padding x 3). Stockinet size 6 to secure. CAM boot.          Follow up in about 2 days (around 7/14/2023) for wound care.

## 2023-07-13 ENCOUNTER — OFFICE VISIT (OUTPATIENT)
Dept: INTERNAL MEDICINE | Facility: CLINIC | Age: 53
End: 2023-07-13
Payer: MEDICARE

## 2023-07-13 ENCOUNTER — TELEPHONE (OUTPATIENT)
Dept: ENDOSCOPY | Facility: HOSPITAL | Age: 53
End: 2023-07-13
Payer: MEDICARE

## 2023-07-13 VITALS
BODY MASS INDEX: 29.9 KG/M2 | OXYGEN SATURATION: 98 % | SYSTOLIC BLOOD PRESSURE: 130 MMHG | WEIGHT: 225.63 LBS | DIASTOLIC BLOOD PRESSURE: 80 MMHG | HEIGHT: 73 IN | HEART RATE: 115 BPM

## 2023-07-13 DIAGNOSIS — Z00.00 ENCOUNTER FOR PREVENTIVE HEALTH EXAMINATION: Primary | ICD-10-CM

## 2023-07-13 DIAGNOSIS — M86.272 SUBACUTE OSTEOMYELITIS OF LEFT FOOT: ICD-10-CM

## 2023-07-13 DIAGNOSIS — I10 ESSENTIAL HYPERTENSION: ICD-10-CM

## 2023-07-13 DIAGNOSIS — E11.65 TYPE 2 DIABETES MELLITUS WITH HYPERGLYCEMIA, WITH LONG-TERM CURRENT USE OF INSULIN: ICD-10-CM

## 2023-07-13 DIAGNOSIS — E78.2 MIXED HYPERLIPIDEMIA: ICD-10-CM

## 2023-07-13 DIAGNOSIS — Z12.11 SPECIAL SCREENING FOR MALIGNANT NEOPLASMS, COLON: ICD-10-CM

## 2023-07-13 DIAGNOSIS — C18.9 COLON ADENOCARCINOMA: ICD-10-CM

## 2023-07-13 DIAGNOSIS — E11.621 DIABETIC ULCER OF LEFT MIDFOOT ASSOCIATED WITH TYPE 2 DIABETES MELLITUS, WITH BONE INVOLVEMENT WITHOUT EVIDENCE OF NECROSIS: ICD-10-CM

## 2023-07-13 DIAGNOSIS — E87.6 HYPOKALEMIA: ICD-10-CM

## 2023-07-13 DIAGNOSIS — Z79.4 TYPE 2 DIABETES MELLITUS WITH HYPERGLYCEMIA, WITH LONG-TERM CURRENT USE OF INSULIN: ICD-10-CM

## 2023-07-13 DIAGNOSIS — H35.033 HYPERTENSIVE RETINOPATHY, BILATERAL: ICD-10-CM

## 2023-07-13 DIAGNOSIS — L97.529 DIABETIC ULCER OF TOE OF LEFT FOOT ASSOCIATED WITH DIABETES MELLITUS OF OTHER TYPE, UNSPECIFIED ULCER STAGE: ICD-10-CM

## 2023-07-13 DIAGNOSIS — G89.29 CHRONIC LEFT SHOULDER PAIN: ICD-10-CM

## 2023-07-13 DIAGNOSIS — E43 SEVERE MALNUTRITION: ICD-10-CM

## 2023-07-13 DIAGNOSIS — E13.621 DIABETIC ULCER OF TOE OF LEFT FOOT ASSOCIATED WITH DIABETES MELLITUS OF OTHER TYPE, UNSPECIFIED ULCER STAGE: ICD-10-CM

## 2023-07-13 DIAGNOSIS — L97.426 DIABETIC ULCER OF LEFT MIDFOOT ASSOCIATED WITH TYPE 2 DIABETES MELLITUS, WITH BONE INVOLVEMENT WITHOUT EVIDENCE OF NECROSIS: ICD-10-CM

## 2023-07-13 DIAGNOSIS — C18.9 COLON ADENOCARCINOMA: Primary | ICD-10-CM

## 2023-07-13 DIAGNOSIS — M25.512 CHRONIC LEFT SHOULDER PAIN: ICD-10-CM

## 2023-07-13 PROCEDURE — 99999 PR PBB SHADOW E&M-EST. PATIENT-LVL IV: ICD-10-PCS | Mod: PBBFAC,,, | Performed by: PHYSICIAN ASSISTANT

## 2023-07-13 PROCEDURE — 99999 PR PBB SHADOW E&M-EST. PATIENT-LVL IV: CPT | Mod: PBBFAC,,, | Performed by: PHYSICIAN ASSISTANT

## 2023-07-13 PROCEDURE — 99214 OFFICE O/P EST MOD 30 MIN: CPT | Mod: PBBFAC | Performed by: PHYSICIAN ASSISTANT

## 2023-07-13 PROCEDURE — G0439 PPPS, SUBSEQ VISIT: HCPCS | Mod: ,,, | Performed by: PHYSICIAN ASSISTANT

## 2023-07-13 PROCEDURE — G0439 PR MEDICARE ANNUAL WELLNESS SUBSEQUENT VISIT: ICD-10-PCS | Mod: ,,, | Performed by: PHYSICIAN ASSISTANT

## 2023-07-13 RX ORDER — SODIUM, POTASSIUM,MAG SULFATES 17.5-3.13G
1 SOLUTION, RECONSTITUTED, ORAL ORAL DAILY
Qty: 1 KIT | Refills: 0 | Status: SHIPPED | OUTPATIENT
Start: 2023-07-13 | End: 2023-07-27

## 2023-07-13 NOTE — TELEPHONE ENCOUNTER
Spoke to patient to schedule procedure(s) Colonoscopy       Physician to perform procedure(s) Dr. REGINO Stout  Date of Procedure (s) 9/13/23  Arrival Time 10:35 AM  Time of Procedure(s) 11:35 AM   Location of Procedure(s) [facility] [address] Floor  Type of Rx Prep sent to patient: Suprep  Instructions provided to patient via [facility]    Patient was informed on the following information and verbalized understanding. Screening questionnaire reviewed with patient and complete. If procedure requires anesthesia, a responsible adult needs to be present to accompany the patient home, patient cannot drive after receiving anesthesia. Appointment details are tentative, especially check-in time. Patient will receive a prep-op call 4 days prior to confirm check-in time for procedure. If applicable the patient should contact their pharmacy to verify Rx for procedure prep is ready for pick-up. Patient was advised to call the scheduling department at [phone] if pharmacy states no Rx is available. Patient was advised to call the endoscopy scheduling department if any questions or concerns arise.      [facility] Scheduling Department

## 2023-07-13 NOTE — PATIENT INSTRUCTIONS
The Endoscopy/Colonoscopy team should be contacting you to schedule your appointment. You can also call them directly at [phone] to schedule your upper endoscopy and/or colonoscopy in a few days if you haven't heard from them.    Counseling and Referral of Other Preventative  (Italic type indicates deductible and co-insurance are waived)    Patient Name: Indio Ryder  Today's Date: 7/20/2023    Health Maintenance       Date Due Completion Date    Shingles Vaccine (1 of 2) Never done ---    Low Dose Statin Never done ---    Pneumococcal Vaccines (Age 0-64) (3 - PPSV23 if available, else PCV20) 08/12/2019 6/9/2017    COVID-19 Vaccine (4 - Moderna series) 01/14/2022 11/19/2021    Colorectal Cancer Screening 09/21/2022 3/21/2022    Hemoglobin A1c 04/13/2023 1/13/2023    Diabetes Urine Screening 07/01/2023 7/1/2022    Influenza Vaccine (1) 09/01/2023 11/3/2020    Eye Exam 10/06/2023 10/6/2022    Foot Exam 10/17/2023 10/17/2022    Lipid Panel 01/13/2024 1/13/2023    TETANUS VACCINE 06/09/2027 6/9/2017        No orders of the defined types were placed in this encounter.      The following information is provided to all patients.  This information is to help you find resources for any of the problems found today that may be affecting your health:                Living healthy guide: www.[facility].[place].gov      Understanding Diabetes: www.diabetes.org      Eating healthy: www.cdc.gov/healthyweight      CDC home safety checklist: www.cdc.gov/steadi/patient.html      Agency on Aging: www.goea.[place].[facility]      Alcoholics anonymous (AA): www.aa.org      Physical Activity: www.junior.nih.gov/zt3vshu      Tobacco use: www.quitwithusla.org

## 2023-07-14 ENCOUNTER — HOSPITAL ENCOUNTER (OUTPATIENT)
Dept: RADIOLOGY | Facility: HOSPITAL | Age: 53
Discharge: HOME | End: 2023-07-14
Attending: PODIATRIST
Payer: MEDICARE

## 2023-07-14 ENCOUNTER — HOSPITAL ENCOUNTER (OUTPATIENT)
Dept: WOUND CARE | Facility: HOSPITAL | Age: 53
Discharge: HOME | End: 2023-07-14
Attending: PODIATRIST
Payer: MEDICARE

## 2023-07-14 VITALS — DIASTOLIC BLOOD PRESSURE: 70 MMHG | SYSTOLIC BLOOD PRESSURE: 127 MMHG | HEART RATE: 104 BPM | TEMPERATURE: 98 F

## 2023-07-14 DIAGNOSIS — E11.621 DIABETIC ULCER OF LEFT MIDFOOT ASSOCIATED WITH TYPE 2 DIABETES MELLITUS, WITH BONE INVOLVEMENT WITHOUT EVIDENCE OF NECROSIS: Primary | ICD-10-CM

## 2023-07-14 DIAGNOSIS — M86.60 ACUTE ON CHRONIC OSTEOMYELITIS: ICD-10-CM

## 2023-07-14 DIAGNOSIS — M86.10 ACUTE ON CHRONIC OSTEOMYELITIS: ICD-10-CM

## 2023-07-14 DIAGNOSIS — L97.426 DIABETIC ULCER OF LEFT MIDFOOT ASSOCIATED WITH TYPE 2 DIABETES MELLITUS, WITH BONE INVOLVEMENT WITHOUT EVIDENCE OF NECROSIS: Primary | ICD-10-CM

## 2023-07-14 DIAGNOSIS — E11.621 DIABETIC ULCER OF LEFT MIDFOOT ASSOCIATED WITH TYPE 2 DIABETES MELLITUS, WITH BONE INVOLVEMENT WITHOUT EVIDENCE OF NECROSIS: ICD-10-CM

## 2023-07-14 DIAGNOSIS — L97.426 DIABETIC ULCER OF LEFT MIDFOOT ASSOCIATED WITH TYPE 2 DIABETES MELLITUS, WITH BONE INVOLVEMENT WITHOUT EVIDENCE OF NECROSIS: ICD-10-CM

## 2023-07-14 LAB
GRAM STN SPEC: NORMAL
GRAM STN SPEC: NORMAL

## 2023-07-14 PROCEDURE — 87075 CULTR BACTERIA EXCEPT BLOOD: CPT | Performed by: PODIATRIST

## 2023-07-14 PROCEDURE — 99214 PR OFFICE/OUTPT VISIT, EST, LEVL IV, 30-39 MIN: ICD-10-PCS | Mod: 25,,, | Performed by: PODIATRIST

## 2023-07-14 PROCEDURE — 11044 WOUND DEBRIDEMENT: ICD-10-PCS | Mod: ,,, | Performed by: PODIATRIST

## 2023-07-14 PROCEDURE — 73718 MRI FOOT (MIDFOOT) LEFT WITHOUT CONTRAST: ICD-10-PCS | Mod: 26,LT,, | Performed by: RADIOLOGY

## 2023-07-14 PROCEDURE — 87102 FUNGUS ISOLATION CULTURE: CPT | Performed by: PODIATRIST

## 2023-07-14 PROCEDURE — 87206 SMEAR FLUORESCENT/ACID STAI: CPT | Performed by: PODIATRIST

## 2023-07-14 PROCEDURE — 73718 MRI LOWER EXTREMITY W/O DYE: CPT | Mod: TC,LT

## 2023-07-14 PROCEDURE — 88311 PR  DECALCIFY TISSUE: ICD-10-PCS | Mod: 26,,, | Performed by: PATHOLOGY

## 2023-07-14 PROCEDURE — 88311 DECALCIFY TISSUE: CPT | Mod: 26,,, | Performed by: PATHOLOGY

## 2023-07-14 PROCEDURE — 88311 DECALCIFY TISSUE: CPT | Performed by: PATHOLOGY

## 2023-07-14 PROCEDURE — 87205 SMEAR GRAM STAIN: CPT | Performed by: PODIATRIST

## 2023-07-14 PROCEDURE — 11044 DBRDMT BONE 1ST 20 SQ CM/<: CPT | Mod: ,,, | Performed by: PODIATRIST

## 2023-07-14 PROCEDURE — 87116 MYCOBACTERIA CULTURE: CPT | Performed by: PODIATRIST

## 2023-07-14 PROCEDURE — 88307 TISSUE EXAM BY PATHOLOGIST: CPT | Mod: 26,,, | Performed by: PATHOLOGY

## 2023-07-14 PROCEDURE — 88307 PR  SURG PATH,LEVEL V: ICD-10-PCS | Mod: 26,,, | Performed by: PATHOLOGY

## 2023-07-14 PROCEDURE — 99214 OFFICE O/P EST MOD 30 MIN: CPT | Mod: 25,,, | Performed by: PODIATRIST

## 2023-07-14 PROCEDURE — 11044 DBRDMT BONE 1ST 20 SQ CM/<: CPT | Performed by: PODIATRIST

## 2023-07-14 PROCEDURE — 87186 SC STD MICRODIL/AGAR DIL: CPT | Mod: 59 | Performed by: PODIATRIST

## 2023-07-14 PROCEDURE — 88307 TISSUE EXAM BY PATHOLOGIST: CPT | Performed by: PATHOLOGY

## 2023-07-14 PROCEDURE — 73718 MRI LOWER EXTREMITY W/O DYE: CPT | Mod: 26,LT,, | Performed by: RADIOLOGY

## 2023-07-14 PROCEDURE — 87077 CULTURE AEROBIC IDENTIFY: CPT | Performed by: PODIATRIST

## 2023-07-14 PROCEDURE — 87070 CULTURE OTHR SPECIMN AEROBIC: CPT | Performed by: PODIATRIST

## 2023-07-14 NOTE — PROGRESS NOTES
[facility]                Progress Note    Subjective:       Patient ID: Indio Ryder Jr. is a 53 y.o. male.    Chief Complaint: Wound Check    Follow up wound care visit. Patient ambulated to exam room with own tennis shoe with lift on Right Foot and CAM boot on LLE. No c/o pain at present. Denies fever, chills or flu-like symptoms. Dressing to Left Foot is intact with a large amount of serosanguineous, non-malodor drainage.    Wound Check    Review of Systems   Constitutional:  Negative for appetite change, chills, fatigue and fever.   HENT:  Negative for hearing loss.    Eyes:  Negative for photophobia and visual disturbance.   Respiratory:  Negative for cough, chest tightness, shortness of breath and wheezing.    Cardiovascular:  Positive for leg swelling. Negative for chest pain and palpitations.   Gastrointestinal:  Negative for constipation, diarrhea, nausea and vomiting.   Endocrine: Negative for cold intolerance and heat intolerance.   Genitourinary:  Negative for flank pain.   Musculoskeletal:  Positive for gait problem and myalgias. Negative for neck pain and neck stiffness.   Skin:  Positive for wound. Negative for color change.   Neurological:  Positive for numbness. Negative for weakness, light-headedness and headaches.        + paresthesia    Psychiatric/Behavioral:  Negative for sleep disturbance.        Objective:        Physical Exam  Constitutional:       Appearance: He is well-developed.   Cardiovascular:      Comments: Dorsalis pedis and posterior tibial pulses are palpable Skin is atrophic, slightly hyperpigmented, and mildly edematous      Musculoskeletal:         General: No tenderness. Normal range of motion.      Comments: Muscle strength is 5/5 in all groups bilaterally.    S/p partial 5th ray amp b/l    S/p hallux amp right    Fat pad atrophy to heels and met heads bilateral       Skin:     General: Skin is warm and dry.      Coloration:  Skin is not jaundiced, mottled or sallow.      Findings: Wound (see below) present. No abscess or ecchymosis.      Comments:        Neurological:      Mental Status: He is alert and oriented to person, place, and time.      Comments: [place] 5.07 monofilamant testing is diminished Kenji feet. Sharp/dull sensation diminished Bilaterally. Light touch absent Bilaterally.       Psychiatric:         Behavior: Behavior normal.       Vitals:    07/14/23 1102   BP: 127/70   Pulse: 104   Temp: 97.8 °F (36.6 °C)       Assessment:           ICD-10-CM ICD-9-CM   1. Diabetic ulcer of left midfoot associated with type 2 diabetes mellitus, with bone involvement without evidence of necrosis  E11.621 250.80    L97.426 707.14   2. Acute on chronic osteomyelitis  M86.10 730.00    M86.60 730.10            (Retired) Wound 04/08/22 1137 Diabetic Ulcer Left plantar;lateral Foot (Active)   04/08/22 1137    Pre-existing: Yes   Primary Wound Type: Diabetic ulc   Side: Left   Orientation: plantar;lateral   Location: Foot   Wound Number:    Ankle-Brachial Index:    Pulses:    Removal Indication and Assessment:    Wound Outcome:    (Retired) Wound Type:    (Retired) Wound Length (cm):    (Retired) Wound Width (cm):    (Retired) Depth (cm):    Wound Description (Comments):    Removal Indications:    Wound Image   07/14/23 1456   Wound WDL ex 07/14/23 1456   Dressing Appearance Intact;Moist drainage 07/14/23 1456   Drainage Amount Large 07/14/23 1456   Drainage Characteristics/Odor Serosanguineous;No odor 07/14/23 1456   Appearance Red;Moist;Bone 07/14/23 1456   Tissue loss description Full thickness 07/14/23 1456   Black (%), Wound Tissue Color 0 % 07/14/23 1456   Red (%), Wound Tissue Color 100 % 07/14/23 1456   Yellow (%), Wound Tissue Color 0 % 07/14/23 1456   Periwound Area Pale white;Moist;Macerated 07/14/23 1456   Wound Edges Defined;Open 07/14/23 1456   Wound Length (cm) 4.4 cm 07/14/23 1456   Wound Width (cm) 3.3 cm 07/14/23 1456    Wound Depth (cm) 0.5 cm 07/14/23 1456   Wound Volume (cm^3) 7.26 cm^3 07/14/23 1456   Wound Surface Area (cm^2) 14.52 cm^2 07/14/23 1456   Tunneling (depth (cm)/location) 0 07/14/23 1456   Undermining (depth (cm)/location) 0 07/14/23 1456   Care Cleansed with:;Antimicrobial agent;Sterile normal saline;Wound cleanser 07/14/23 1456   Dressing Changed 07/14/23 1456   Periwound Care Moisture barrier applied 07/14/23 1456   Compression Two layer compression 07/14/23 1456   Off Loading Football dressing;Off loading shoe 07/14/23 1456   Dressing Change Due 07/19/23 07/14/23 1456           Plan:              Education about the prevention of limb loss.    Discussed wound healing cycle, skin integrity, ways to care for skin.Counseled patient on the effects of biomechanical pressure,  PVD, and blood glucose on healing. He verbalizes understanding that it can increase the chances of delayed healing and this prolonged exposure leads to infection or progression of infection which subsequently can result in loss of limb.    Adequate vitamin supplementation, protein intake, and hydration - discussed with patient    The wound is cleansed of foreign material as much as possible and the base inspected for bone or abscess.     Wound beds on Left Plantar Foot are now connected as 1 wound, probing to bone at 12 o'clock position.     Strong clinical suspicion of OM    Full workup to rule out OM including ESR, CRP, and imaging (MRI).      Debridement: procedure note at end    Long discussion bout potential surgical offloading and treatment    Discussed options for treatment of OM. Options were discussed with possible side effects of each. Resection of infected bone +/- amputation vs bone biopsy for C&S IV abx for six weeks with aggressive wound care for several months with no guarantee of wound resolution or PO abx (if indicated based on culture) for six weeks with aggressive wound care for several months with no guarantee of wound  resolution (discussed regular lab work involved with abx options).      Patient will be seen by ID for further evaluation and discussion on if he is a candidate for long term abx therapy or surgical resection will be more appropriate.     Wound care done as per order. Return to clinic  for a Nurse Visit and then 1 week to see MD. Patient declined AVS.    Short-term goals include maintaining good offloading and minimizing bioburden, promoting granulation and epithelialization to healing.  Long-term goals include keeping the wound healed by good offloading and medical management under the direction of internist.    Shoe inspection. Diabetic Foot Education. Patient reminded of the importance of good nutrition and blood sugar control to help prevent podiatric complications of diabetes. Patient instructed on proper foot hygeine. We discussed wearing proper shoe gear, daily foot inspections, never walking without protective shoe gear, never putting sharp instruments to feet.        Wound Debridement    Date/Time: 7/14/2023 11:00 AM  Performed by: Marivel Peterson DPM  Authorized by: Marivel Peterson DPM       Wound Details:    Location:  Left foot    Location:  Left Midfoot    Type of Debridement:  Excisional       Length (cm):  4.4       Area (sq cm):  13.2       Width (cm):  3       Percent Debrided (%):  100       Depth (cm):  0.5       Total Area Debrided (sq cm):  13.2    Depth of debridement:  Bone    Tissue debrided:  Dermis, Epidermis, Bone and Subcutaneous    Devitalized tissue debrided:  Callus and Fibrin    Instruments:  Rongeur, Curette, Scissors and Forceps  Bleeding:  Minimal  Hemostasis Achieved: Yes  Method Used:  Pressure  Patient tolerance:  Patient tolerated the procedure well with no immediate complications      Tissue pathology and/or culture taken     [] Yes      [x] No  Sharp debridement performed                   [x] Yes       [] No  Labs ordered     [] Yes       [x] No  Imaging ordered    [] Yes       [x] No    Orders Placed This Encounter   Procedures    Debridement     This order was created via procedure documentation     Standing Status:   Standing     Number of Occurrences:   1    Gram stain     Standing Status:   Standing     Number of Occurrences:   1    MRI Foot (Midfoot) Left Without Contrast     Standing Status:   Future     Number of Occurrences:   1     Standing Expiration Date:   7/14/2024     Order Specific Question:   Does the patient have a pacemaker or a defibrillator (Note: Some facilities may not be able to schedule an MRI for patients with pacemakers and defibrillators. You should contact your local radiology department to determine if this is the case.)?     Answer:   No     Order Specific Question:   Does the patient have an aneurysm or surgical clip, pump, nerve/brain stimulator, middle/inner ear prosthesis, or other metal implant or foreign object (bullet, shrapnel)? If they have a card related to their implant, ask them to bring it. Issues related to the implant may cause the MRI to be delayed.     Answer:   No     Order Specific Question:   Is the patient claustrophobic?     Answer:   No     Order Specific Question:   Will the patient require sedation?     Answer:   No     Order Specific Question:   Does the patient have any of the following conditions? Diabetes, History of Renal Disease or Hypertension requiring medical therapy?     Answer:   Yes     Order Specific Question:   May the Radiologist modify the order per protocol to meet the clinical needs of the patient?     Answer:   Yes     Order Specific Question:   Is this part of a Research Study?     Answer:   No     Order Specific Question:   Does the patient have on a skin patch for medication with aluminized backing?     Answer:   No    Comprehensive Metabolic Panel     Standing Status:   Future     Standing Expiration Date:   9/11/2024    CBC Auto Differential     Standing Status:   Future     Standing Expiration Date:    "9/11/2024    C-Reactive Protein     Standing Status:   Future     Standing Expiration Date:   9/11/2024    Sedimentation rate     Standing Status:   Future     Standing Expiration Date:   9/11/2024    Prealbumin     Standing Status:   Future     Standing Expiration Date:   9/11/2024    Ambulatory referral/consult to Infectious Disease     Standing Status:   Future     Standing Expiration Date:   8/14/2024     Referral Priority:   Urgent     Referral Type:   Consultation     Referral Reason:   Specialty Services Required     Requested Specialty:   Infectious Diseases     Number of Visits Requested:   1    Change dressing     Wound Dressing Orders    Dressing change frequency twice a week  Remove old dressing  Cleanse or irrigate with: Normal Saline    Left Foot  Protect periwound with:  Gentian Violet   Primary dressing: WOUND BASE: Cadexomer Iodine Pad to wound bed  U-Pad around bony prominences  Secondary dressing: Drawtex cut to size of wound bed (stacked x 2), Mextra: size used: 5" x 9", and Abram foam long x 2 laid perpendicular over wound bed  Compression: Coflex - TLC XL (blue layer touching skin then cast padding)  Off-load: Football (cast padding 3 rolls) and CAM boot    Right Foot: Cavilon then U-pad to plantar around bony prominence and Teardrop pad to plantar heel. Tubigrip, single layer, size E.    Change Left Foot at Nurse Visit.        Follow up in about 5 days (around 7/19/2023) for wound care.       "

## 2023-07-17 ENCOUNTER — LAB VISIT (OUTPATIENT)
Dept: LAB | Facility: HOSPITAL | Age: 53
End: 2023-07-17
Payer: MEDICARE

## 2023-07-17 ENCOUNTER — OFFICE VISIT (OUTPATIENT)
Dept: INFECTIOUS DISEASES | Facility: CLINIC | Age: 53
End: 2023-07-17
Payer: MEDICARE

## 2023-07-17 VITALS
HEART RATE: 100 BPM | DIASTOLIC BLOOD PRESSURE: 80 MMHG | BODY MASS INDEX: 29.74 KG/M2 | SYSTOLIC BLOOD PRESSURE: 112 MMHG | WEIGHT: 224.44 LBS | HEIGHT: 73 IN

## 2023-07-17 DIAGNOSIS — Z51.81 THERAPEUTIC DRUG MONITORING: ICD-10-CM

## 2023-07-17 DIAGNOSIS — M86.272 SUBACUTE OSTEOMYELITIS OF LEFT FOOT: ICD-10-CM

## 2023-07-17 DIAGNOSIS — L97.529 DIABETIC ULCER OF TOE OF LEFT FOOT ASSOCIATED WITH DIABETES MELLITUS OF OTHER TYPE, UNSPECIFIED ULCER STAGE: ICD-10-CM

## 2023-07-17 DIAGNOSIS — E13.621 DIABETIC ULCER OF TOE OF LEFT FOOT ASSOCIATED WITH DIABETES MELLITUS OF OTHER TYPE, UNSPECIFIED ULCER STAGE: ICD-10-CM

## 2023-07-17 DIAGNOSIS — M86.272 SUBACUTE OSTEOMYELITIS OF LEFT FOOT: Primary | ICD-10-CM

## 2023-07-17 LAB
ALBUMIN SERPL BCP-MCNC: 2.9 G/DL (ref 3.5–5.2)
ALP SERPL-CCNC: 167 U/L (ref 55–135)
ALT SERPL W/O P-5'-P-CCNC: 13 U/L (ref 10–44)
ANION GAP SERPL CALC-SCNC: 13 MMOL/L (ref 8–16)
AST SERPL-CCNC: 18 U/L (ref 10–40)
BASOPHILS # BLD AUTO: 0.02 K/UL (ref 0–0.2)
BASOPHILS NFR BLD: 0.5 % (ref 0–1.9)
BILIRUB SERPL-MCNC: 0.6 MG/DL (ref 0.1–1)
BUN SERPL-MCNC: 8 MG/DL (ref 6–20)
CALCIUM SERPL-MCNC: 9.7 MG/DL (ref 8.7–10.5)
CHLORIDE SERPL-SCNC: 101 MMOL/L (ref 95–110)
CO2 SERPL-SCNC: 30 MMOL/L (ref 23–29)
CREAT SERPL-MCNC: 0.9 MG/DL (ref 0.5–1.4)
CRP SERPL-MCNC: 23.1 MG/L (ref 0–8.2)
DIFFERENTIAL METHOD: ABNORMAL
EOSINOPHIL # BLD AUTO: 0.1 K/UL (ref 0–0.5)
EOSINOPHIL NFR BLD: 1.5 % (ref 0–8)
ERYTHROCYTE [DISTWIDTH] IN BLOOD BY AUTOMATED COUNT: 14.3 % (ref 11.5–14.5)
EST. GFR  (NO RACE VARIABLE): >60 ML/MIN/1.73 M^2
GLUCOSE SERPL-MCNC: 119 MG/DL (ref 70–110)
HCT VFR BLD AUTO: 42.4 % (ref 40–54)
HGB BLD-MCNC: 13.1 G/DL (ref 14–18)
IMM GRANULOCYTES # BLD AUTO: 0.01 K/UL (ref 0–0.04)
IMM GRANULOCYTES NFR BLD AUTO: 0.3 % (ref 0–0.5)
LYMPHOCYTES # BLD AUTO: 1.4 K/UL (ref 1–4.8)
LYMPHOCYTES NFR BLD: 35.7 % (ref 18–48)
MCH RBC QN AUTO: 27.8 PG (ref 27–31)
MCHC RBC AUTO-ENTMCNC: 30.9 G/DL (ref 32–36)
MCV RBC AUTO: 90 FL (ref 82–98)
MONOCYTES # BLD AUTO: 0.3 K/UL (ref 0.3–1)
MONOCYTES NFR BLD: 7.5 % (ref 4–15)
NEUTROPHILS # BLD AUTO: 2.2 K/UL (ref 1.8–7.7)
NEUTROPHILS NFR BLD: 54.5 % (ref 38–73)
NRBC BLD-RTO: 0 /100 WBC
PLATELET # BLD AUTO: 363 K/UL (ref 150–450)
PMV BLD AUTO: 9.4 FL (ref 9.2–12.9)
POTASSIUM SERPL-SCNC: 3.6 MMOL/L (ref 3.5–5.1)
PROT SERPL-MCNC: 7.8 G/DL (ref 6–8.4)
RBC # BLD AUTO: 4.71 M/UL (ref 4.6–6.2)
SODIUM SERPL-SCNC: 144 MMOL/L (ref 136–145)
WBC # BLD AUTO: 3.98 K/UL (ref 3.9–12.7)

## 2023-07-17 PROCEDURE — 99999 PR PBB SHADOW E&M-EST. PATIENT-LVL III: CPT | Mod: PBBFAC,,, | Performed by: PHYSICIAN ASSISTANT

## 2023-07-17 PROCEDURE — 80053 COMPREHEN METABOLIC PANEL: CPT | Performed by: PHYSICIAN ASSISTANT

## 2023-07-17 PROCEDURE — 86140 C-REACTIVE PROTEIN: CPT | Performed by: PHYSICIAN ASSISTANT

## 2023-07-17 PROCEDURE — 85025 COMPLETE CBC W/AUTO DIFF WBC: CPT | Performed by: PHYSICIAN ASSISTANT

## 2023-07-17 PROCEDURE — 36415 COLL VENOUS BLD VENIPUNCTURE: CPT | Performed by: PHYSICIAN ASSISTANT

## 2023-07-17 PROCEDURE — 99214 OFFICE O/P EST MOD 30 MIN: CPT | Mod: S$PBB,,, | Performed by: PHYSICIAN ASSISTANT

## 2023-07-17 PROCEDURE — 99999 PR PBB SHADOW E&M-EST. PATIENT-LVL III: ICD-10-PCS | Mod: PBBFAC,,, | Performed by: PHYSICIAN ASSISTANT

## 2023-07-17 PROCEDURE — 99214 PR OFFICE/OUTPT VISIT, EST, LEVL IV, 30-39 MIN: ICD-10-PCS | Mod: S$PBB,,, | Performed by: PHYSICIAN ASSISTANT

## 2023-07-17 PROCEDURE — 99213 OFFICE O/P EST LOW 20 MIN: CPT | Mod: PBBFAC | Performed by: PHYSICIAN ASSISTANT

## 2023-07-17 RX ORDER — DOXYCYCLINE 100 MG/1
100 CAPSULE ORAL 2 TIMES DAILY
Qty: 60 CAPSULE | Refills: 6 | Status: SHIPPED | OUTPATIENT
Start: 2023-07-17 | End: 2023-07-27

## 2023-07-18 LAB
BACTERIA SPEC ANAEROBE CULT: NORMAL
FINAL PATHOLOGIC DIAGNOSIS: NORMAL
GROSS: NORMAL
Lab: NORMAL

## 2023-07-18 NOTE — PROGRESS NOTES
Infectious Disease Clinic Note  07/17/2023       Subjective:       Patient ID: Indio Ryder Jr. is a 53 y.o. male being seen for an new visit.    Chief Complaint: Follow-up    HPI     Mr. Ryder is a 51 y/o M with hx DM and numerous episodes of foot osteo presents for f/up of Lt foot osteomyelitis.    Interval hx:  Last seen on 3/2/23. Completed 6 wks cipro (3/12) and 4 wks augmentin (3/8) for pseudomonas (bone and wound cx) and e faecalis (wound cx). Reports increased drainage and size of wound despite continued adherence to treatment plan concerning for infection. Images under media tab reviewed. Wound cx 5/12 grew enterobacter cloacae and e faecalis.    Hx:   Pt has been following with podiatry for a non healing Lt foot ulcer that per pt, suddenly worsened. MRI  1/19 revealed: Extensive marrow edema involving the 4th TMT joint extending to the 2nd and 3rd TMT joints contiguous with the overlying soft tissue ulceration, concerning for osteomyelitis.  Underwent bone biopsy of left trochar on 1/20. Bone culture from 1/20 grew pseudomonas. Bone path revealed OM.  Discussed results with patient and started on cipro x 6 wks on 1/29. Subsequent wound culture from 1/27 growing pseudomonas plus e faecalis (R to tetracycline/ S to amp).       Has been undergoing hyperbaric therapy since 12/2022. Had normal ALISSA 11/18/22. Was treated x 4 wks w augmenting for e faecalis positive wound cx and 6 wks with cipro  (last day 3/12) for pseudomonas OM (bone and wound cx +). Underwent graft placement on 2/24 by Dr. Peterson. Had 2 weeks of cipro/amoxacillin 5/16 for wound infection.     7/17/23:  Referred by POD as increased wound drainge and MRI with stable bone changes at TMT area cf osteo.  Reportedly is to have surgy with Dr. Peterson in near future   Bone cultures 7/14 with CONS - on cipro per patient from POD.  Bone cx with CONS (though may be polymicrobial per [facility] micro lab- being worked up.  Stable.     Family History    Adopted: Yes   Problem Relation Age of Onset    Hypertension Mother     Cancer Mother         breast    Diabetes Mother     Hypertension Father     Cataracts Father     Heart disease Father         mi    Diabetes Sister     No Known Problems Brother     Heart disease Sister         MI    No Known Problems Sister     Hypertension Maternal Aunt     No Known Problems Maternal Uncle     No Known Problems Paternal Aunt     No Known Problems Paternal Uncle     No Known Problems Maternal Grandmother     No Known Problems Maternal Grandfather     No Known Problems Paternal Grandmother     No Known Problems Paternal Grandfather     Amblyopia Neg Hx     Blindness Neg Hx     Glaucoma Neg Hx     Macular degeneration Neg Hx     Retinal detachment Neg Hx     Strabismus Neg Hx     Stroke Neg Hx     Thyroid disease Neg Hx      Social History     Socioeconomic History    Marital status: Single    Number of children: 0   Occupational History    Occupation: Retired     Employer: [employer]   Tobacco Use    Smoking status: Never    Smokeless tobacco: Never   Substance and Sexual Activity    Alcohol use: No    Drug use: No    Sexual activity: Yes     Partners: Female     Birth control/protection: Condom     Social Determinants of Health     Financial Resource Strain: High Risk    Difficulty of Paying Living Expenses: Very hard   Food Insecurity: No Food Insecurity    Worried About Running Out of Food in the Last Year: Never true    Ran Out of Food in the Last Year: Never true   Transportation Needs: No Transportation Needs    Lack of Transportation (Medical): No    Lack of Transportation (Non-Medical): No   Physical Activity: Inactive    Days of Exercise per Week: 0 days    Minutes of Exercise per Session: 0 min   Stress: No Stress Concern Present    Feeling of Stress : Not at all   Social Connections: Moderately Isolated    Frequency of Communication with Friends and Family: More than three times a week    Frequency of Social  Gatherings with Friends and Family: More than three times a week    Attends Congregational Services: More than 4 times per year    Active Member of Clubs or Organizations: No    Attends Club or Organization Meetings: Never    Marital Status:    Housing Stability: Low Risk     Unable to Pay for Housing in the Last Year: No    Number of Places Lived in the Last Year: 1    Unstable Housing in the Last Year: No     Past Surgical History:   Procedure Laterality Date    COLONOSCOPY Right 1/19/2022    Procedure: COLONOSCOPY;  Surgeon: Tj Haile MD;  Location: [facility] ENDO (4TH FLR);  Service: Endoscopy;  Laterality: Right;  previous order un-usable/poor prep 1/18/22  RAPID COVID test arrival 12:20  instructions handed to pt- sm    COLONOSCOPY N/A 3/21/2022    Procedure: COLONOSCOPY;  Surgeon: Sheng Haque MD;  Location: [facility] ENDO (2ND FLR);  Service: Endoscopy;  Laterality: N/A;  Colonoscopy with AES for hepatix flexure polyp removal, 2nd floor  Pt is fully vaccinated-DS  Pt prescribed Plavix by Dr. Stahl in Jan. 2022, however pt states that he never started taking it-DS  3/16/22-Instructions sent via portal-DS    DEBRIDEMENT OF FOOT Left 7/14/2019    Procedure: DEBRIDEMENT, FOOT, with left 5th ray amputation;  Surgeon: Sis Hickman DPM;  Location: [facility] OR 2ND FLR;  Service: Podiatry;  Laterality: Left;    DEBRIDEMENT OF FOOT Left 7/17/2019    Procedure: DEBRIDEMENT, FOOT with leftt 5th ray partial amputation with Neox Graft;  Surgeon: Mai Burrell DPM;  Location: [facility] OR 2ND FLR;  Service: Podiatry;  Laterality: Left;  t    DEBRIDEMENT OF FOOT Bilateral 4/29/2021    Procedure: DEBRIDEMENT, FOOT;  Surgeon: Mai Burrell DPM;  Location: [facility] OR 1ST FLR;  Service: Podiatry;  Laterality: Bilateral;  Graft application    TOE AMPUTATION Right 06/05/2015    TOE AMPUTATION Right 01/19/2017    XI ROBOTIC COLECTOMY, RIGHT N/A 4/25/2022    Procedure: XI ROBOTIC COLECTOMY, RIGHT (lithotomy, eras low);  " Surgeon: Erik Stout MD;  [facility];  Service: Colon and Rectal;  Laterality: N/A;  Paruch to Goodwin    XI ROBOTIC COLECTOMY, RIGHT  4/25/2022    Procedure: ;  Surgeon: Erik Stout MD;  [facility];  Service: Colon and Rectal;;       Patient's Medications   New Prescriptions    DOXYCYCLINE (VIBRAMYCIN) 100 MG CAP    Take 1 capsule (100 mg total) by mouth 2 (two) times daily.   Previous Medications    ACETAMINOPHEN (TYLENOL) 325 MG TABLET    Take 2 tablets (650 mg total) by mouth every 6 (six) hours as needed.    DIPHENOXYLATE-ATROPINE 2.5-0.025 MG (LOMOTIL) 2.5-0.025 MG PER TABLET    Take 1 to 2 tablets by mouth three times daily as needed for diarrhea    EMPAGLIFLOZIN (JARDIANCE) 10 MG TABLET    Take 1 tablet (10 mg total) by mouth once daily.    FLUTICASONE PROPIONATE (FLONASE) 50 MCG/ACTUATION NASAL SPRAY    Use 2 sprays (100 mcg total) in each nostril once daily.    INSULIN ASPART U-100 (NOVOLOG) 100 UNIT/ML (3 ML) INPN PEN    Inject 8 units before meals plus scale 150-200+2, 201-250+4, 251-300+6, 301-350+8, >350+10. Max daily 54 units.    INSULIN DETEMIR U-100 (LEVEMIR FLEXTOUCH) 100 UNIT/ML (3 ML) SUBQ INPN PEN    Inject 26 Units into the skin every evening.    ONETOUCH DELICA LANCETS 33 GAUGE [facility]        ONETOUCH ULTRA2 KIT        PANTOPRAZOLE (PROTONIX) 40 MG TABLET    Take 1 tablet (40 mg total) by mouth once daily.    PEN NEEDLE, DIABETIC (BD SASCHA 2ND GEN PEN NEEDLE) 32 GAUGE X 5/32" NDLE    Use 4 times a day    SEMAGLUTIDE (OZEMPIC) 1 MG/DOSE (4 MG/3 ML)    Inject 1 mg into the skin every 7 days.   Modified Medications    No medications on file   Discontinued Medications    No medications on file       Patient Active Problem List    Diagnosis Date Noted    Pre-ulcerative calluses 06/30/2023    Chronic osteomyelitis of right foot 12/09/2022    Diabetic ulcer of right foot associated with type 2 diabetes mellitus, with fat layer exposed 04/26/2022    Colon " "adenocarcinoma 04/12/2022    Acute on chronic osteomyelitis 04/29/2021    Diabetic ulcer of left foot 04/28/2021    Septic arthritis of left foot 04/28/2021    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 edema, with long-term current use of insulin 03/23/2021    Chronic left shoulder pain 07/30/2020    Allergic reaction due to antibacterial drug 04/05/2020    Subacute osteomyelitis of left foot 03/11/2020    Hypertensive retinopathy, bilateral 01/28/2020    Diabetic ulcer of left midfoot associated with type 2 diabetes mellitus, with bone involvement without evidence of necrosis 08/02/2019    Severe malnutrition 07/22/2019    Diabetic ulcer of right midfoot associated with type 2 diabetes mellitus, with necrosis of bone 07/12/2019    Neck pain 11/08/2017    Hypokalemia 05/30/2017    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   Respiratory:  Negative for cough and shortness of breath.    Cardiovascular:  Negative for chest pain and orthopnea.   Gastrointestinal:  Negative for abdominal pain, diarrhea and vomiting.   Genitourinary:  Negative for dysuria.   Musculoskeletal:  Negative for back pain and myalgias.        B/l foot wounds   Neurological:  Positive for sensory change. Negative for weakness.         Objective:      /80 (BP Location: Left arm)   Pulse 100   Ht 6' 1" (1.854 m)   Wt 101.8 kg (224 lb 6.9 oz)   BMI 29.61 kg/m²   Estimated body mass index is 29.61 kg/m² as calculated from the following:    Height as of this encounter: 6' 1" (1.854 m).    Weight as of this encounter: 101.8 kg (224 " lb 6.9 oz).    Physical Exam  Vitals and nursing note reviewed.   Constitutional:       General: He is not in acute distress.     Appearance: Normal appearance. He is not ill-appearing, toxic-appearing or diaphoretic.       HENT:      Head: Normocephalic and atraumatic.      Nose: Nose normal. No congestion.      Mouth/Throat:      Mouth: Mucous membranes are moist.      Pharynx: Oropharynx is clear.   Eyes:      Conjunctiva/sclera: Conjunctivae normal.      Pupils: Pupils are equal, round, and reactive to light.   Cardiovascular:      Rate and Rhythm: Normal rate and regular rhythm.   Pulmonary:      Effort: Pulmonary effort is normal. No respiratory distress.      Breath sounds: Normal breath sounds.   Abdominal:      General: Abdomen is flat. There is no distension.      Palpations: Abdomen is soft.   Musculoskeletal:         General: No swelling or tenderness. Normal range of motion.      Cervical back: Normal range of motion and neck supple.      Comments: Clean dressings at Lt foot with orthotic shoe.    Skin:     General: Skin is warm and dry.   Neurological:      General: No focal deficit present.      Mental Status: He is alert and oriented to person, place, and time.   Psychiatric:         Mood and Affect: Mood normal.         Behavior: Behavior normal.       Assessment:         1. Subacute osteomyelitis of left foot  doxycycline (VIBRAMYCIN) 100 MG Cap    C-Reactive Protein    CBC Auto Differential    Comprehensive Metabolic Panel    ECG 12 lead      2. Diabetic ulcer of toe of left foot associated with diabetes mellitus of other type, unspecified ulcer stage  doxycycline (VIBRAMYCIN) 100 MG Cap    C-Reactive Protein    CBC Auto Differential    Comprehensive Metabolic Panel    ECG 12 lead      3. Therapeutic drug monitoring  C-Reactive Protein    CBC Auto Differential    Comprehensive Metabolic Panel    ECG 12 lead                    Plan:         Diabetic ulcer of left midfoot associated with type 2  diabetes mellitus, with fat layer exposed  53y/o M with hx of LLE DM foot ulcers c/b osteo s/p 6 wks abx (last day 3/12), now with increased drainage at nonealing ulcer concerning for infection. Wound cx growing pan-sen enterobacter cloacae and amp- S e faecalis.  Treated 5/16 with 2 weeks of po cipro and amoxicillin. Now with increased wound drainage.  MRI L foot with stable changes ar 2,3, and 4 TMT area - likely chonic osteo    Plan:   - stop cipro  - start doxycycline 100mg po bid  - [facility] lab instructed to completely work up bone cx  - labs and EKG today  --continue to f/u closely with podiatry  - FU by phone and in 2 weeks  - coordinate care with pod        Total professional time spent for the encounter: 30 minutes  Time was spent preparing to see the patient, reviewing results of prior testing, obtaining and/or reviewing separately obtained history, performing a medically appropriate examination and interview, counseling and educating the patient/family, ordering medications/tests/procedures, referring and communicating with other health care professionals, documenting clinical information in the electronic health record, and independently interpreting results.

## 2023-07-19 ENCOUNTER — HOSPITAL ENCOUNTER (OUTPATIENT)
Dept: CARDIOLOGY | Facility: HOSPITAL | Age: 53
Discharge: HOME | End: 2023-07-19
Attending: PHYSICIAN ASSISTANT
Payer: MEDICARE

## 2023-07-19 ENCOUNTER — HOSPITAL ENCOUNTER (OUTPATIENT)
Dept: WOUND CARE | Facility: HOSPITAL | Age: 53
Discharge: HOME | End: 2023-07-19
Attending: FAMILY MEDICINE
Payer: MEDICARE

## 2023-07-19 VITALS — DIASTOLIC BLOOD PRESSURE: 79 MMHG | HEART RATE: 106 BPM | SYSTOLIC BLOOD PRESSURE: 129 MMHG | TEMPERATURE: 98 F

## 2023-07-19 DIAGNOSIS — M86.272 SUBACUTE OSTEOMYELITIS OF LEFT FOOT: ICD-10-CM

## 2023-07-19 DIAGNOSIS — L97.426 DIABETIC ULCER OF LEFT MIDFOOT ASSOCIATED WITH TYPE 2 DIABETES MELLITUS, WITH BONE INVOLVEMENT WITHOUT EVIDENCE OF NECROSIS: Primary | ICD-10-CM

## 2023-07-19 DIAGNOSIS — E11.621 DIABETIC ULCER OF LEFT MIDFOOT ASSOCIATED WITH TYPE 2 DIABETES MELLITUS, WITH BONE INVOLVEMENT WITHOUT EVIDENCE OF NECROSIS: Primary | ICD-10-CM

## 2023-07-19 DIAGNOSIS — M86.10 ACUTE ON CHRONIC OSTEOMYELITIS: ICD-10-CM

## 2023-07-19 DIAGNOSIS — M86.60 ACUTE ON CHRONIC OSTEOMYELITIS: ICD-10-CM

## 2023-07-19 DIAGNOSIS — E13.621 DIABETIC ULCER OF TOE OF LEFT FOOT ASSOCIATED WITH DIABETES MELLITUS OF OTHER TYPE, UNSPECIFIED ULCER STAGE: ICD-10-CM

## 2023-07-19 DIAGNOSIS — L97.529 DIABETIC ULCER OF TOE OF LEFT FOOT ASSOCIATED WITH DIABETES MELLITUS OF OTHER TYPE, UNSPECIFIED ULCER STAGE: ICD-10-CM

## 2023-07-19 DIAGNOSIS — Z51.81 THERAPEUTIC DRUG MONITORING: ICD-10-CM

## 2023-07-19 PROCEDURE — 93010 EKG 12-LEAD: ICD-10-PCS | Mod: ,,, | Performed by: INTERNAL MEDICINE

## 2023-07-19 PROCEDURE — 29581 APPL MULTLAYER CMPRN SYS LEG: CPT

## 2023-07-19 PROCEDURE — 93010 ELECTROCARDIOGRAM REPORT: CPT | Mod: ,,, | Performed by: INTERNAL MEDICINE

## 2023-07-19 PROCEDURE — 93005 ELECTROCARDIOGRAM TRACING: CPT | Mod: 59

## 2023-07-20 LAB — BACTERIA ISLT: ABNORMAL

## 2023-07-20 NOTE — PROGRESS NOTES
"  Indio Ryder presented for a  Medicare AWV and comprehensive Health Risk Assessment today. The following components were reviewed and updated:    Medical history  Family History  Social history  Allergies and Current Medications  Health Risk Assessment  Health Maintenance  Care Team         ** See Completed Assessments for Annual Wellness Visit within the encounter summary.**         The following assessments were completed:  Living Situation  CAGE  Depression Screening  Timed Get Up and Go  Whisper Test  Cognitive Function Screening  Nutrition Screening  ADL Screening  PAQ Screening          Vitals:    07/13/23 1113   BP: 130/80   BP Location: Right arm   Patient Position: Sitting   BP Method: Medium (Manual)   Pulse: (!) 115   SpO2: 98%   Weight: 102.3 kg (225 lb 10.3 oz)   Height: 6' 1" (1.854 m)     Body mass index is 29.77 kg/m².  Physical Exam  Vitals and nursing note reviewed.   Constitutional:       Appearance: He is well-developed.   HENT:      Head: Normocephalic.      Right Ear: External ear normal.      Left Ear: External ear normal.   Eyes:      Pupils: Pupils are equal, round, and reactive to light.   Cardiovascular:      Rate and Rhythm: Normal rate and regular rhythm.      Heart sounds: Normal heart sounds. No murmur heard.    No friction rub. No gallop.   Pulmonary:      Effort: Pulmonary effort is normal. No respiratory distress.      Breath sounds: Normal breath sounds.   Abdominal:      Palpations: Abdomen is soft.      Tenderness: There is no abdominal tenderness.   Musculoskeletal:         General: No swelling.   Skin:     General: Skin is warm and dry.   Neurological:      General: No focal deficit present.      Mental Status: He is alert.   Psychiatric:         Mood and Affect: Mood normal.             Diagnoses and health risks identified today and associated recommendations/orders:    1. Encounter for preventive health examination  Assessments completed.  Preventative health " recommendations reviewed.    2. Hypertensive retinopathy, bilateral  Stable, followed by optometry.    3. Essential hypertension  Stable, controlled on current medical therapy, followed by PCP.  BP Readings from Last 5 Encounters:   07/19/23 129/79   07/17/23 112/80   07/14/23 127/70   07/13/23 130/80   07/12/23 (!) 157/76      4. Mixed hyperlipidemia  Stable, controlled on current medical therapy, followed by PCP.    5. Hypokalemia  Stable, controlled on current medical therapy, followed by PCP.    6. Colon adenocarcinoma  Stable, chronic, followed by CRS.    7. Type 2 diabetes mellitus with hyperglycemia, with long-term current use of insulin  Chronic, uncontrolled, overdue for follow up, will assist in scheduling, followed by PCP and Gigi Clay NP.  Lab Results   Component Value Date    HGBA1C 10.1 (H) 01/13/2023    HGBA1C 9.8 (H) 11/22/2022    HGBA1C 13.3 (H) 07/01/2022     8. Severe malnutrition  Stable, controlled on current medical therapy, followed by PCP.    9. Subacute osteomyelitis of left foot  Stable, controlled on current medical therapy, followed by PCP, ID, and podiatry.    10. Diabetic ulcer of left midfoot associated with type 2 diabetes mellitus, with bone involvement without evidence of necrosis  Stable, controlled on current medical therapy, followed by PCP, ID, and podiatry.    11. Diabetic ulcer of toe of left foot associated with diabetes mellitus of other type, unspecified ulcer stage  Stable, controlled on current medical therapy, followed by PCP, ID, and podiatry.    12. Chronic left shoulder pain  Stable, controlled on current medical therapy, followed by PCP.      Provided Indio with a 5-10 year written screening schedule and personal prevention plan. Recommendations were developed using the USPSTF age appropriate recommendations. Education, counseling, and referrals were provided as needed. After Visit Summary printed and given to patient which includes a list of additional  screenings\tests needed.    Follow up if symptoms worsen or fail to improve.    Juliana Travis PA-C      I offered to discuss advanced care planning, including how to pick a person who would make decisions for you if you were unable to make them for yourself, called a health care power of , and what kind of decisions you might make such as use of life sustaining treatments such as ventilators and tube feeding when faced with a life limiting illness recorded on a living will that they will need to know. (How you want to be cared for as you near the end of your natural life)     X Patient is interested in learning more about how to make advanced directives.  I provided them paperwork and offered to discuss this with them.

## 2023-07-21 ENCOUNTER — TELEPHONE (OUTPATIENT)
Dept: INTERVENTIONAL RADIOLOGY/VASCULAR | Facility: CLINIC | Age: 53
End: 2023-07-21
Payer: MEDICARE

## 2023-07-21 ENCOUNTER — HOSPITAL ENCOUNTER (OUTPATIENT)
Dept: WOUND CARE | Facility: HOSPITAL | Age: 53
Discharge: HOME | End: 2023-07-21
Attending: PODIATRIST
Payer: MEDICARE

## 2023-07-21 VITALS — TEMPERATURE: 98 F | DIASTOLIC BLOOD PRESSURE: 66 MMHG | SYSTOLIC BLOOD PRESSURE: 130 MMHG | HEART RATE: 91 BPM

## 2023-07-21 DIAGNOSIS — L97.426 DIABETIC ULCER OF LEFT MIDFOOT ASSOCIATED WITH TYPE 2 DIABETES MELLITUS, WITH BONE INVOLVEMENT WITHOUT EVIDENCE OF NECROSIS: Primary | ICD-10-CM

## 2023-07-21 DIAGNOSIS — E11.621 DIABETIC ULCER OF LEFT MIDFOOT ASSOCIATED WITH TYPE 2 DIABETES MELLITUS, WITH BONE INVOLVEMENT WITHOUT EVIDENCE OF NECROSIS: Primary | ICD-10-CM

## 2023-07-21 DIAGNOSIS — M86.10 ACUTE ON CHRONIC OSTEOMYELITIS: ICD-10-CM

## 2023-07-21 DIAGNOSIS — M86.00 ACUTE HEMATOGENOUS OSTEOMYELITIS, UNSPECIFIED SITE: Primary | ICD-10-CM

## 2023-07-21 DIAGNOSIS — M86.60 ACUTE ON CHRONIC OSTEOMYELITIS: ICD-10-CM

## 2023-07-21 PROCEDURE — 11043 DBRDMT MUSC&/FSCA 1ST 20/<: CPT | Mod: ,,, | Performed by: PODIATRIST

## 2023-07-21 PROCEDURE — 11043 WOUND DEBRIDEMENT: ICD-10-PCS | Mod: ,,, | Performed by: PODIATRIST

## 2023-07-21 PROCEDURE — 11042 DBRDMT SUBQ TIS 1ST 20SQCM/<: CPT | Performed by: PODIATRIST

## 2023-07-21 PROCEDURE — 99499 NO LOS: ICD-10-PCS | Mod: ,,, | Performed by: PODIATRIST

## 2023-07-21 PROCEDURE — 11043 DBRDMT MUSC&/FSCA 1ST 20/<: CPT | Performed by: PODIATRIST

## 2023-07-21 PROCEDURE — 99499 UNLISTED E&M SERVICE: CPT | Mod: ,,, | Performed by: PODIATRIST

## 2023-07-21 RX ORDER — CEFAZOLIN SODIUM 2 G/50ML
2 SOLUTION INTRAVENOUS
Status: CANCELLED | OUTPATIENT
Start: 2023-07-21

## 2023-07-21 RX ORDER — AMOXICILLIN AND CLAVULANATE POTASSIUM 875; 125 MG/1; MG/1
1 TABLET, FILM COATED ORAL 2 TIMES DAILY
Qty: 28 TABLET | Refills: 0 | Status: SHIPPED | OUTPATIENT
Start: 2023-07-21 | End: 2023-07-27

## 2023-07-21 NOTE — H&P (VIEW-ONLY)
[facility]                Progress Note    Subjective:       Patient ID: Indio Ryder Jr. is a 53 y.o. male.    Chief Complaint: Wound Check    Patient returns to wound clinic for left foot wound. Walked to clinic with TLC to left leg and Tubigrip on right. No wounds to right leg L leg has serous drainage that saturated Drawtex and half covered Mextra but all contained and no strike through drainage. On last encounter, trocar bone biopsy competed.  Patient to see infectious disease for consult today.  He has no new pedal complains.  He denies nausea, vomiting, fever, chills.     Wound Check      Review of Systems   Constitutional:  Negative for appetite change, chills, fatigue and fever.   HENT:  Negative for hearing loss.    Eyes:  Negative for photophobia and visual disturbance.   Respiratory:  Negative for cough, chest tightness, shortness of breath and wheezing.    Cardiovascular:  Positive for leg swelling. Negative for chest pain and palpitations.   Gastrointestinal:  Negative for constipation, diarrhea, nausea and vomiting.   Endocrine: Negative for cold intolerance and heat intolerance.   Genitourinary:  Negative for flank pain.   Musculoskeletal:  Positive for gait problem and myalgias. Negative for neck pain and neck stiffness.   Skin:  Positive for wound. Negative for color change.   Neurological:  Positive for numbness. Negative for weakness, light-headedness and headaches.        + paresthesia    Psychiatric/Behavioral:  Negative for sleep disturbance.          Objective:        Physical Exam  Constitutional:       Appearance: He is well-developed.   Cardiovascular:      Comments: Dorsalis pedis and posterior tibial pulses are palpable Skin is atrophic, slightly hyperpigmented, and mildly edematous      Musculoskeletal:         General: No tenderness. Normal range of motion.      Comments: Muscle strength is 5/5 in all groups bilaterally.    S/p  partial 5th ray amp b/l    S/p hallux amp right    Fat pad atrophy to heels and met heads bilateral       Skin:     General: Skin is warm and dry.      Coloration: Skin is not jaundiced, mottled or sallow.      Findings: Wound (see below) present. No abscess or ecchymosis.      Comments:        Neurological:      Mental Status: He is alert and oriented to person, place, and time.      Comments: [place] 5.07 monofilamant testing is diminished Kenji feet. Sharp/dull sensation diminished Bilaterally. Light touch absent Bilaterally.       Psychiatric:         Behavior: Behavior normal.         Vitals:    07/21/23 1110   BP: 130/66   Pulse: 91   Temp: 98.2 °F (36.8 °C)       Assessment:           ICD-10-CM ICD-9-CM   1. Diabetic ulcer of left midfoot associated with type 2 diabetes mellitus, with bone involvement without evidence of necrosis  E11.621 250.80    L97.426 707.14   2. Acute on chronic osteomyelitis  M86.10 730.00    M86.60 730.10            (Retired) Wound 04/08/22 1137 Diabetic Ulcer Left plantar;lateral Foot (Active)   04/08/22 1137    Pre-existing: Yes   Primary Wound Type: Diabetic ulc   Side: Left   Orientation: plantar;lateral   Location: Foot   Wound Number:    Ankle-Brachial Index:    Pulses:    Removal Indication and Assessment:    Wound Outcome:    (Retired) Wound Type:    (Retired) Wound Length (cm):    (Retired) Wound Width (cm):    (Retired) Depth (cm):    Wound Description (Comments):    Removal Indications:    Wound Image   07/21/23 1125   Wound WDL ex 07/21/23 1125   Dressing Appearance Dry;Intact 07/21/23 1125   Drainage Amount Large 07/21/23 1125   Drainage Characteristics/Odor Serous 07/21/23 1125   Appearance Red 07/21/23 1125   Tissue loss description Full thickness 07/21/23 1125   Red (%), Wound Tissue Color 100 % 07/21/23 1125   Periwound Area Macerated 07/21/23 1125   Wound Length (cm) 4 cm 07/21/23 1125   Wound Width (cm) 3 cm 07/21/23 1125   Wound Depth (cm) 0.5 cm 07/21/23  1125   Wound Volume (cm^3) 6 cm^3 07/21/23 1125   Wound Surface Area (cm^2) 12 cm^2 07/21/23 1125   Care Cleansed with:;Antimicrobial agent;Sterile normal saline 07/21/23 1125   Dressing Changed 07/21/23 1125   Periwound Care Moisture barrier applied 07/21/23 1125   Dressing Change Due 07/28/23 07/21/23 1125           Plan:              Education about the prevention of limb loss.    Discussed wound healing cycle, skin integrity, ways to care for skin.Counseled patient on the effects of biomechanical pressure,  PVD, and blood glucose on healing. He verbalizes understanding that it can increase the chances of delayed healing and this prolonged exposure leads to infection or progression of infection which subsequently can result in loss of limb.    Adequate vitamin supplementation, protein intake, and hydration - discussed with patient    The wound is cleansed of foreign material as much as possible and the base inspected for bone or abscess.    Now has one wound ttg measures 0.4 and 0.3 cm smaller in length and width. Some maceration to immediate cici wound.     OM confirmed.    Debridement: procedure note at end    Again, long discussion about potential surgical offloading and treatment    Discussed options for treatment of OM. Options were discussed with possible side effects of each. Resection of infected bone +/- amputation vs bone biopsy for C&S IV abx for six weeks with aggressive wound care for several months with no guarantee of wound resolution or PO abx (if indicated based on culture) for six weeks with aggressive wound care for several months with no guarantee of wound resolution (discussed regular lab work involved with abx options).      Patient is amenable to aggressive surgical debridement of 4th metatarsal but is somewhat skeptical about toe amputation.  Discussed consent for toe amputation; informed him that if I have enough skin for closure following debridement of metatarsal, I will leave the  floating 4th toe and not amputate it. Plan for surgery 07/31/2023 at [facility]    Patient will be seen by ID for further evaluation and discussion on if he is a candidate for long term abx therapy.    Wound care done as per order. Return to clinic for Nurse visit on Monday and follow up on Friday Refused printed AVS    Short-term goals include maintaining good offloading and minimizing bioburden, promoting granulation and epithelialization to healing.  Long-term goals include keeping the wound healed by good offloading and medical management under the direction of internist.    Shoe inspection. Diabetic Foot Education. Patient reminded of the importance of good nutrition and blood sugar control to help prevent podiatric complications of diabetes. Patient instructed on proper foot hygeine. We discussed wearing proper shoe gear, daily foot inspections, never walking without protective shoe gear, never putting sharp instruments to feet.        Wound Debridement    Date/Time: 7/21/2023 11:00 AM    Performed by: Marivel Peterson DPM  Authorized by: Marivel Peterson DPM      Wound Details:    Location:  Left foot    Location:  Left Midfoot    Type of Debridement:  Excisional       Length (cm):  4       Area (sq cm):  12       Width (cm):  3       Percent Debrided (%):  100       Depth (cm):  0.5       Total Area Debrided (sq cm):  12    Depth of debridement:  Muscle/fascia/tendon    Tissue debrided:  Dermis, Epidermis, Subcutaneous and Tendon    Devitalized tissue debrided:  Callus and Fibrin    Instruments:  Curette  Bleeding:  Minimal  Hemostasis Achieved: Yes  Method Used:  Pressure  Patient tolerance:  Patient tolerated the procedure well with no immediate complications        Tissue pathology and/or culture taken     [] Yes      [x] No  Sharp debridement performed                   [x] Yes       [] No  Labs ordered     [] Yes       [x] No  Imaging ordered    [] Yes      [x] No    Orders Placed This Encounter    Procedures    Debridement     This order was created via procedure documentation     Standing Status:   Standing     Number of Occurrences:   1    Change dressing     Wound Dressing Orders    Dressing change frequency twice a week nurse and MD visit  Remove old dressing  Cleanse or irrigate with: Normal Saline  Protect periwound with:  periwound: Gentian Violet   Primary dressing - adjust to fit: WOUND BASE:  custum pad, Urgutul AG, Drawtex, Mextra  Secondary dressing: Football x 3 cast padding   Off-load: CAM walker boot  Compression: Coflex - TLC XL  Other:    Case Request Operating Room: AMPUTATION, TOE     Standing Status:   Standing     Number of Occurrences:   1     Order Specific Question:   Medical Necessity:     Answer:   Medically Non-Urgent [100]     Order Specific Question:   CPT Code:     Answer:   [place] AMPUTATION METATARSAL+TOE,SINGLE [51989]     Order Specific Question:   Post-Procedure Disposition:     Answer:   Amb Surgery/DOSC [2]     Order Specific Question:   Is an on-site pathologist required for this procedure?     Answer:   Frozen Section        Follow up in 1 week (on 7/28/2023) for and nurse visit.

## 2023-07-21 NOTE — PROGRESS NOTES
[facility]                Progress Note    Subjective:       Patient ID: Indio Ryder Jr. is a 53 y.o. male.    Chief Complaint: Wound Check    Patient returns to wound clinic for left foot wound. Walked to clinic with TLC to left leg and Tubigrip on right. No wounds to right leg L leg has serous drainage that saturated Drawtex and half covered Mextra but all contained and no strike through drainage. On last encounter, trocar bone biopsy competed.  Patient to see infectious disease for consult today.  He has no new pedal complains.  He denies nausea, vomiting, fever, chills.     Wound Check      Review of Systems   Constitutional:  Negative for appetite change, chills, fatigue and fever.   HENT:  Negative for hearing loss.    Eyes:  Negative for photophobia and visual disturbance.   Respiratory:  Negative for cough, chest tightness, shortness of breath and wheezing.    Cardiovascular:  Positive for leg swelling. Negative for chest pain and palpitations.   Gastrointestinal:  Negative for constipation, diarrhea, nausea and vomiting.   Endocrine: Negative for cold intolerance and heat intolerance.   Genitourinary:  Negative for flank pain.   Musculoskeletal:  Positive for gait problem and myalgias. Negative for neck pain and neck stiffness.   Skin:  Positive for wound. Negative for color change.   Neurological:  Positive for numbness. Negative for weakness, light-headedness and headaches.        + paresthesia    Psychiatric/Behavioral:  Negative for sleep disturbance.          Objective:        Physical Exam  Constitutional:       Appearance: He is well-developed.   Cardiovascular:      Comments: Dorsalis pedis and posterior tibial pulses are palpable Skin is atrophic, slightly hyperpigmented, and mildly edematous      Musculoskeletal:         General: No tenderness. Normal range of motion.      Comments: Muscle strength is 5/5 in all groups bilaterally.    S/p  partial 5th ray amp b/l    S/p hallux amp right    Fat pad atrophy to heels and met heads bilateral       Skin:     General: Skin is warm and dry.      Coloration: Skin is not jaundiced, mottled or sallow.      Findings: Wound (see below) present. No abscess or ecchymosis.      Comments:        Neurological:      Mental Status: He is alert and oriented to person, place, and time.      Comments: [place]en[place] 5.07 monofilamant testing is diminished Kenji feet. Sharp/dull sensation diminished Bilaterally. Light touch absent Bilaterally.       Psychiatric:         Behavior: Behavior normal.         Vitals:    07/21/23 1110   BP: 130/66   Pulse: 91   Temp: 98.2 °F (36.8 °C)       Assessment:           ICD-10-CM ICD-9-CM   1. Diabetic ulcer of left midfoot associated with type 2 diabetes mellitus, with bone involvement without evidence of necrosis  E11.621 250.80    L97.426 707.14   2. Acute on chronic osteomyelitis  M86.10 730.00    M86.60 730.10            (Retired) Wound 04/08/22 1137 Diabetic Ulcer Left plantar;lateral Foot (Active)   04/08/22 1137    Pre-existing: Yes   Primary Wound Type: Diabetic ulc   Side: Left   Orientation: plantar;lateral   Location: Foot   Wound Number:    Ankle-Brachial Index:    Pulses:    Removal Indication and Assessment:    Wound Outcome:    (Retired) Wound Type:    (Retired) Wound Length (cm):    (Retired) Wound Width (cm):    (Retired) Depth (cm):    Wound Description (Comments):    Removal Indications:    Wound Image   07/21/23 1125   Wound WDL ex 07/21/23 1125   Dressing Appearance Dry;Intact 07/21/23 1125   Drainage Amount Large 07/21/23 1125   Drainage Characteristics/Odor Serous 07/21/23 1125   Appearance Red 07/21/23 1125   Tissue loss description Full thickness 07/21/23 1125   Red (%), Wound Tissue Color 100 % 07/21/23 1125   Periwound Area Macerated 07/21/23 1125   Wound Length (cm) 4 cm 07/21/23 1125   Wound Width (cm) 3 cm 07/21/23 1125   Wound Depth (cm) 0.5 cm 07/21/23  1125   Wound Volume (cm^3) 6 cm^3 07/21/23 1125   Wound Surface Area (cm^2) 12 cm^2 07/21/23 1125   Care Cleansed with:;Antimicrobial agent;Sterile normal saline 07/21/23 1125   Dressing Changed 07/21/23 1125   Periwound Care Moisture barrier applied 07/21/23 1125   Dressing Change Due 07/28/23 07/21/23 1125           Plan:              Education about the prevention of limb loss.    Discussed wound healing cycle, skin integrity, ways to care for skin.Counseled patient on the effects of biomechanical pressure,  PVD, and blood glucose on healing. He verbalizes understanding that it can increase the chances of delayed healing and this prolonged exposure leads to infection or progression of infection which subsequently can result in loss of limb.    Adequate vitamin supplementation, protein intake, and hydration - discussed with patient    The wound is cleansed of foreign material as much as possible and the base inspected for bone or abscess.    Now has one wound ttg measures 0.4 and 0.3 cm smaller in length and width. Some maceration to immediate cici wound.     OM confirmed.    Debridement: procedure note at end    Again, long discussion about potential surgical offloading and treatment    Discussed options for treatment of OM. Options were discussed with possible side effects of each. Resection of infected bone +/- amputation vs bone biopsy for C&S IV abx for six weeks with aggressive wound care for several months with no guarantee of wound resolution or PO abx (if indicated based on culture) for six weeks with aggressive wound care for several months with no guarantee of wound resolution (discussed regular lab work involved with abx options).      Patient is amenable to aggressive surgical debridement of 4th metatarsal but is somewhat skeptical about toe amputation.  Discussed consent for toe amputation; informed him that if I have enough skin for closure following debridement of metatarsal, I will leave the  floating 4th toe and not amputate it. Plan for surgery 07/31/2023 at [facility]    Patient will be seen by ID for further evaluation and discussion on if he is a candidate for long term abx therapy.    Wound care done as per order. Return to clinic for Nurse visit on Monday and follow up on Friday Refused printed AVS    Short-term goals include maintaining good offloading and minimizing bioburden, promoting granulation and epithelialization to healing.  Long-term goals include keeping the wound healed by good offloading and medical management under the direction of internist.    Shoe inspection. Diabetic Foot Education. Patient reminded of the importance of good nutrition and blood sugar control to help prevent podiatric complications of diabetes. Patient instructed on proper foot hygeine. We discussed wearing proper shoe gear, daily foot inspections, never walking without protective shoe gear, never putting sharp instruments to feet.        Wound Debridement    Date/Time: 7/21/2023 11:00 AM    Performed by: Marivel Peterson DPM  Authorized by: Marivel Peterson DPM      Wound Details:    Location:  Left foot    Location:  Left Midfoot    Type of Debridement:  Excisional       Length (cm):  4       Area (sq cm):  12       Width (cm):  3       Percent Debrided (%):  100       Depth (cm):  0.5       Total Area Debrided (sq cm):  12    Depth of debridement:  Muscle/fascia/tendon    Tissue debrided:  Dermis, Epidermis, Subcutaneous and Tendon    Devitalized tissue debrided:  Callus and Fibrin    Instruments:  Curette  Bleeding:  Minimal  Hemostasis Achieved: Yes  Method Used:  Pressure  Patient tolerance:  Patient tolerated the procedure well with no immediate complications        Tissue pathology and/or culture taken     [] Yes      [x] No  Sharp debridement performed                   [x] Yes       [] No  Labs ordered     [] Yes       [x] No  Imaging ordered    [] Yes      [x] No    Orders Placed This Encounter    Procedures    Debridement     This order was created via procedure documentation     Standing Status:   Standing     Number of Occurrences:   1    Change dressing     Wound Dressing Orders    Dressing change frequency twice a week nurse and MD visit  Remove old dressing  Cleanse or irrigate with: Normal Saline  Protect periwound with:  periwound: Gentian Violet   Primary dressing - adjust to fit: WOUND BASE:  custum pad, Urgutul AG, Drawtex, Mextra  Secondary dressing: Football x 3 cast padding   Off-load: CAM walker boot  Compression: Coflex - TLC XL  Other:    Case Request Operating Room: AMPUTATION, TOE     Standing Status:   Standing     Number of Occurrences:   1     Order Specific Question:   Medical Necessity:     Answer:   Medically Non-Urgent [100]     Order Specific Question:   CPT Code:     Answer:   [place] AMPUTATION METATARSAL+TOE,SINGLE [00891]     Order Specific Question:   Post-Procedure Disposition:     Answer:   Amb Surgery/DOSC [2]     Order Specific Question:   Is an on-site pathologist required for this procedure?     Answer:   Frozen Section        Follow up in 1 week (on 7/28/2023) for and nurse visit.

## 2023-07-21 NOTE — PROGRESS NOTES
Per discussion with POD - surgery on 7/31.  Bone cultures now show E faecalis (AMP sensitive),  corynebacterium, and kocuria kristinae.  Will start on augmentin 875 mg po bid until surgery.  Rec admission for PICC line placement and to follow cultures.  Will place PICC referral for IR in meantime to see if can get done prior to suregry.  Plan on long term abx as suspect chronic.  FU next week

## 2023-07-24 ENCOUNTER — TELEPHONE (OUTPATIENT)
Dept: INTERVENTIONAL RADIOLOGY/VASCULAR | Facility: HOSPITAL | Age: 53
End: 2023-07-24
Payer: MEDICARE

## 2023-07-24 ENCOUNTER — TELEPHONE (OUTPATIENT)
Dept: INTERVENTIONAL RADIOLOGY/VASCULAR | Facility: CLINIC | Age: 53
End: 2023-07-24
Payer: MEDICARE

## 2023-07-24 ENCOUNTER — TELEPHONE (OUTPATIENT)
Dept: ADMINISTRATIVE | Facility: HOSPITAL | Age: 53
End: 2023-07-24
Payer: MEDICARE

## 2023-07-24 ENCOUNTER — HOSPITAL ENCOUNTER (OUTPATIENT)
Dept: WOUND CARE | Facility: HOSPITAL | Age: 53
Discharge: HOME | End: 2023-07-24
Attending: INTERNAL MEDICINE
Payer: MEDICARE

## 2023-07-24 VITALS — HEART RATE: 87 BPM | TEMPERATURE: 98 F | SYSTOLIC BLOOD PRESSURE: 146 MMHG | DIASTOLIC BLOOD PRESSURE: 83 MMHG

## 2023-07-24 PROCEDURE — 99212 OFFICE O/P EST SF 10 MIN: CPT | Mod: 25

## 2023-07-24 PROCEDURE — 29581 APPL MULTLAYER CMPRN SYS LEG: CPT

## 2023-07-24 NOTE — PROGRESS NOTES
Pt ambulated to room w/o assistance. Dressing clean and intact. No swelling noted to left leg. Applied same dressing as ordered.

## 2023-07-24 NOTE — TELEPHONE ENCOUNTER
[facility]    Spoke with patient regarding home IV antibiotic. Patient states has infused many times before and is comfortable with administration. Patient aware he will get picc line placed tomorrow and first dosing in ID infusion suite. He will need to go to ID infusion suite for weekly line care and labs. Patient agreeable to plan.     [facility]  Annetta Diaz Rn, Clinical Liaison Manager  Cell [phone]  Office [phone]  Fax [phone]

## 2023-07-24 NOTE — NURSING
Pre-procedure call complete.  Pt instructed not to eat or drink anything after midnight the night before procedure.  Pt aware will need someone to provide transport home and monitor pt post procedure.  No driving for at least 24 hours after procedure.   Medications reviewed.  Arrival time and location given.  Expected length of stay reviewed.  Covid screening completed.  Pt verbalized understanding.

## 2023-07-25 ENCOUNTER — DOCUMENTATION ONLY (OUTPATIENT)
Dept: ADMINISTRATIVE | Facility: HOSPITAL | Age: 53
End: 2023-07-25
Payer: MEDICARE

## 2023-07-25 ENCOUNTER — HOSPITAL ENCOUNTER (OUTPATIENT)
Dept: INTERVENTIONAL RADIOLOGY/VASCULAR | Facility: HOSPITAL | Age: 53
Discharge: HOME | End: 2023-07-25
Attending: PHYSICIAN ASSISTANT | Admitting: RADIOLOGY
Payer: MEDICARE

## 2023-07-25 ENCOUNTER — INFUSION (OUTPATIENT)
Dept: INFECTIOUS DISEASES | Facility: HOSPITAL | Age: 53
End: 2023-07-25
Payer: MEDICARE

## 2023-07-25 VITALS
DIASTOLIC BLOOD PRESSURE: 68 MMHG | OXYGEN SATURATION: 100 % | SYSTOLIC BLOOD PRESSURE: 126 MMHG | BODY MASS INDEX: 29.69 KG/M2 | HEIGHT: 73 IN | HEART RATE: 76 BPM | TEMPERATURE: 97 F | RESPIRATION RATE: 18 BRPM | WEIGHT: 224 LBS

## 2023-07-25 VITALS
HEART RATE: 86 BPM | WEIGHT: 221.13 LBS | SYSTOLIC BLOOD PRESSURE: 136 MMHG | TEMPERATURE: 98 F | BODY MASS INDEX: 29.17 KG/M2 | DIASTOLIC BLOOD PRESSURE: 88 MMHG | OXYGEN SATURATION: 95 %

## 2023-07-25 DIAGNOSIS — D49.9 NEOPLASM: Primary | ICD-10-CM

## 2023-07-25 DIAGNOSIS — M86.671 CHRONIC OSTEOMYELITIS OF RIGHT FOOT: Primary | ICD-10-CM

## 2023-07-25 DIAGNOSIS — M86.10 ACUTE ON CHRONIC OSTEOMYELITIS: ICD-10-CM

## 2023-07-25 DIAGNOSIS — M86.00 ACUTE HEMATOGENOUS OSTEOMYELITIS, UNSPECIFIED SITE: ICD-10-CM

## 2023-07-25 DIAGNOSIS — M86.60 ACUTE ON CHRONIC OSTEOMYELITIS: ICD-10-CM

## 2023-07-25 LAB
BACTERIA SPEC AEROBE CULT: ABNORMAL
GLUCOSE SERPL-MCNC: 171 MG/DL (ref 70–110)
GLUCOSE SERPL-MCNC: 175 MG/DL (ref 70–110)

## 2023-07-25 PROCEDURE — 76937 IR PICC TUNNELED WITH PORT (XPD): ICD-10-PCS | Mod: 26,,, | Performed by: RADIOLOGY

## 2023-07-25 PROCEDURE — 36571 IR PICC TUNNELED WITH PORT (XPD): ICD-10-PCS | Mod: LT,,, | Performed by: RADIOLOGY

## 2023-07-25 PROCEDURE — 63600175 PHARM REV CODE 636 W HCPCS: Performed by: RADIOLOGY

## 2023-07-25 PROCEDURE — 25000003 PHARM REV CODE 250: Performed by: RADIOLOGY

## 2023-07-25 PROCEDURE — 96365 THER/PROPH/DIAG IV INF INIT: CPT

## 2023-07-25 PROCEDURE — 77001 IR PICC TUNNELED WITH PORT (XPD): ICD-10-PCS | Mod: 26,,, | Performed by: RADIOLOGY

## 2023-07-25 PROCEDURE — 25000003 PHARM REV CODE 250: Performed by: PHYSICIAN ASSISTANT

## 2023-07-25 PROCEDURE — 76937 US GUIDE VASCULAR ACCESS: CPT | Mod: 26,,, | Performed by: RADIOLOGY

## 2023-07-25 PROCEDURE — 36571 INSERT PICVAD CATH: CPT | Mod: LT,,, | Performed by: RADIOLOGY

## 2023-07-25 PROCEDURE — C1751 CATH, INF, PER/CENT/MIDLINE: HCPCS

## 2023-07-25 PROCEDURE — 77001 FLUOROGUIDE FOR VEIN DEVICE: CPT | Mod: TC | Performed by: RADIOLOGY

## 2023-07-25 PROCEDURE — C1769 GUIDE WIRE: HCPCS

## 2023-07-25 PROCEDURE — 82962 GLUCOSE BLOOD TEST: CPT

## 2023-07-25 PROCEDURE — C1894 INTRO/SHEATH, NON-LASER: HCPCS

## 2023-07-25 PROCEDURE — 63600175 PHARM REV CODE 636 W HCPCS: Performed by: PHYSICIAN ASSISTANT

## 2023-07-25 RX ORDER — HEPARIN 100 UNIT/ML
500 SYRINGE INTRAVENOUS
Status: DISCONTINUED | OUTPATIENT
Start: 2023-07-25 | End: 2023-07-25 | Stop reason: HOSPADM

## 2023-07-25 RX ORDER — HEPARIN SODIUM 1000 [USP'U]/ML
INJECTION, SOLUTION INTRAVENOUS; SUBCUTANEOUS
Status: COMPLETED | OUTPATIENT
Start: 2023-07-25 | End: 2023-07-25

## 2023-07-25 RX ORDER — LIDOCAINE HYDROCHLORIDE 10 MG/ML
1 INJECTION, SOLUTION EPIDURAL; INFILTRATION; INTRACAUDAL; PERINEURAL ONCE
Status: DISCONTINUED | OUTPATIENT
Start: 2023-07-25 | End: 2023-07-26 | Stop reason: HOSPADM

## 2023-07-25 RX ORDER — SODIUM CHLORIDE 0.9 % (FLUSH) 0.9 %
10 SYRINGE (ML) INJECTION
Status: DISCONTINUED | OUTPATIENT
Start: 2023-07-25 | End: 2023-07-25 | Stop reason: HOSPADM

## 2023-07-25 RX ORDER — SODIUM CHLORIDE 9 MG/ML
INJECTION, SOLUTION INTRAVENOUS CONTINUOUS
Status: DISCONTINUED | OUTPATIENT
Start: 2023-07-25 | End: 2023-07-26 | Stop reason: HOSPADM

## 2023-07-25 RX ORDER — HEPARIN 100 UNIT/ML
500 SYRINGE INTRAVENOUS
Status: CANCELLED | OUTPATIENT
Start: 2023-07-25

## 2023-07-25 RX ORDER — SODIUM CHLORIDE 0.9 % (FLUSH) 0.9 %
10 SYRINGE (ML) INJECTION
Status: CANCELLED | OUTPATIENT
Start: 2023-07-25

## 2023-07-25 RX ORDER — SODIUM CHLORIDE 0.9 % (FLUSH) 0.9 %
10 SYRINGE (ML) INJECTION
OUTPATIENT
Start: 2023-07-26

## 2023-07-25 RX ORDER — LIDOCAINE HYDROCHLORIDE 20 MG/ML
INJECTION, SOLUTION EPIDURAL; INFILTRATION; INTRACAUDAL; PERINEURAL
Status: COMPLETED | OUTPATIENT
Start: 2023-07-25 | End: 2023-07-25

## 2023-07-25 RX ORDER — HEPARIN 100 UNIT/ML
500 SYRINGE INTRAVENOUS
OUTPATIENT
Start: 2023-07-26

## 2023-07-25 RX ADMIN — LIDOCAINE HYDROCHLORIDE 2 ML: 20 INJECTION, SOLUTION EPIDURAL; INFILTRATION; INTRACAUDAL; PERINEURAL at 11:07

## 2023-07-25 RX ADMIN — HEPARIN SODIUM 300 UNITS: 1000 INJECTION, SOLUTION INTRAVENOUS; SUBCUTANEOUS at 11:07

## 2023-07-25 RX ADMIN — SODIUM CHLORIDE: 9 INJECTION, SOLUTION INTRAVENOUS at 03:07

## 2023-07-25 RX ADMIN — SODIUM CHLORIDE 800 MG: 9 INJECTION, SOLUTION INTRAVENOUS at 03:07

## 2023-07-25 NOTE — PLAN OF CARE
Discharge instructions given and explained to patient with verbalization of understanding all instructions. Patients v/s stable, denies n/v and tolerating po, rates pain level tolerable, IV removed, and family at bedside for patient discharge home.

## 2023-07-25 NOTE — PROCEDURES
Radiology History & Physical      SUBJECTIVE:     Chief Complaint: Osteomyelitis    History of Present Illness:  Indio Ryder Jr. is a 53 y.o. male who presents for PICC line placement for IV abx for osteomyelitis.   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    Right foot    Colon adenocarcinoma 04/12/2022    Diabetic ketoacidosis without coma associated with type 2 diabetes mellitus 05/30/2017    Diabetic ulcer of right foot associated with type 2 diabetes mellitus 06/03/2015    Disorder of kidney and ureter     Essential hypertension 06/06/2013    Group B streptococcal infection 01/13/2017    Mixed hyperlipidemia 08/12/2014    Septic arthritis of left foot 04/28/2021    Shoulder impingement 08/12/2014    Subacute osteomyelitis of right foot 01/12/2017    Streptococcus agalactiae, Actinomyces odontolyticus    Traumatic amputation of fifth toe of right foot 07/02/2015    Type 2 diabetes mellitus with diabetic neuropathy, with long-term current use of insulin 05/03/2016     Past Surgical History:   Procedure Laterality Date    COLONOSCOPY Right 1/19/2022    Procedure: COLONOSCOPY;  Surgeon: Tj Haile MD;  Location: [facility] (4TH FLR);  Service: Endoscopy;  Laterality: Right;  previous order un-usable/poor prep 1/18/22  RAPID COVID test arrival 12:20  instructions handed to pt- sm    COLONOSCOPY N/A 3/21/2022    Procedure: COLONOSCOPY;  Surgeon: Sheng Haque MD;  Location: [facility] (2ND FLR);  Service: Endoscopy;  Laterality: N/A;  Colonoscopy with AES for hepatix flexure polyp removal, 2nd floor  Pt is fully vaccinated-DS  Pt prescribed Plavix by Dr. Stahl in Jan. 2022, however pt states that he never started taking it-DS  3/16/22-Instructions sent via portal-DS    DEBRIDEMENT OF FOOT Left 7/14/2019    Procedure: DEBRIDEMENT, FOOT, with left 5th ray amputation;  Surgeon: Sis Hickman DPM;  Location: [facility] OR 2ND FLR;  Service: Podiatry;  Laterality: Left;     DEBRIDEMENT OF FOOT Left 7/17/2019    Procedure: DEBRIDEMENT, FOOT with leftt 5th ray partial amputation with Neox Graft;  Surgeon: Mai Burrell DPM;  Location: [facility] OR 2ND FLR;  Service: Podiatry;  Laterality: Left;  t    DEBRIDEMENT OF FOOT Bilateral 4/29/2021    Procedure: DEBRIDEMENT, FOOT;  Surgeon: Mai Burrell DPM;  Location: [facility] OR 1ST FLR;  Service: Podiatry;  Laterality: Bilateral;  Graft application    TOE AMPUTATION Right 06/05/2015    TOE AMPUTATION Right 01/19/2017    XI ROBOTIC COLECTOMY, RIGHT N/A 4/25/2022    Procedure: XI ROBOTIC COLECTOMY, RIGHT (lithotomy, eras low);  Surgeon: Erik Stout MD;  Location: [facility] OR 2ND FLR;  Service: Colon and Rectal;  Laterality: N/A;  Paruch to Goodwin    XI ROBOTIC COLECTOMY, RIGHT  4/25/2022    Procedure: ;  Surgeon: Erik Stout MD;  Location: [facility] OR 2ND FLR;  Service: Colon and Rectal;;       Home Meds:   Prior to Admission medications    Medication Sig Start Date End Date Taking? Authorizing Provider   amoxicillin-clavulanate 875-125mg (AUGMENTIN) 875-125 mg per tablet Take 1 tablet by mouth 2 (two) times daily. for 14 days 7/21/23 8/7/23 Yes RAMILA Chris Jr.   doxycycline (VIBRAMYCIN) 100 MG Cap Take 1 capsule (100 mg total) by mouth 2 (two) times daily. 7/17/23  Yes RAMILA Chris Jr.   empagliflozin (JARDIANCE) 10 mg tablet Take 1 tablet (10 mg total) by mouth once daily. 1/17/23  Yes JESUS Bernardo, FNP   acetaminophen (TYLENOL) 325 MG tablet Take 2 tablets (650 mg total) by mouth every 6 (six) hours as needed. 5/1/21   Billie Justin MD   diphenoxylate-atropine 2.5-0.025 mg (LOMOTIL) 2.5-0.025 mg per tablet Take 1 to 2 tablets by mouth three times daily as needed for diarrhea 6/6/22   Lorena Thakur MD   fluticasone propionate (FLONASE) 50 mcg/actuation nasal spray Use 2 sprays (100 mcg total) in each nostril once daily. 12/20/22   Neftali Burrell MD   insulin aspart U-100 (NOVOLOG)  "100 unit/mL (3 mL) InPn pen Inject 8 units before meals plus scale 150-200+2, 201-250+4, 251-300+6, 301-350+8, >350+10. Max daily 54 units. 8/26/21   JESUS Bernardo FNP   insulin detemir U-100 (LEVEMIR FLEXTOUCH) 100 unit/mL (3 mL) SubQ InPn pen Inject 26 Units into the skin every evening. 12/21/22 12/21/23  JESUS Bernardo FNP   ONETOUCH DELICA LANCETS 33 gauge Misc  5/4/17   Historical Provider   ONETOUCH ULTRA2 kit  3/29/17   Historical Provider   pantoprazole (PROTONIX) 40 MG tablet Take 1 tablet (40 mg total) by mouth once daily. 7/1/22 7/1/23  Umair Welch MD   pen needle, diabetic (BD SASCHA 2ND GEN PEN NEEDLE) 32 gauge x 5/32" Ndle Use 4 times a day  Patient taking differently: Use 4 times a day 8/26/21   JESUS Bernardo FNP   semaglutide (OZEMPIC) 1 mg/dose (4 mg/3 mL) Inject 1 mg into the skin every 7 days. 1/17/23 1/17/24  JESUS Bernardo FNP         Allergies: Review of patient's allergies indicates:  No Known Allergies          OBJECTIVE:     Vital Signs (Most Recent)  Temp: 97.7 °F (36.5 °C) (07/25/23 1028)  Pulse: 80 (07/25/23 1028)  Resp: 17 (07/25/23 1028)  BP: (!) 140/85 (07/25/23 1028)  SpO2: 98 % (07/25/23 1028)    Physical Exam:  ASA: 2  Mallampati: 2    General: no acute distress  Mental Status: alert and oriented to person, place and time  HEENT: normocephalic, atraumatic  Chest: unlabored breathing  Heart: regular heart rate  Abdomen: nondistended  Extremity: moves all extremities    Laboratory  Lab Results   Component Value Date    INR 1.0 07/24/2023       Lab Results   Component Value Date    WBC 3.98 07/17/2023    HGB 13.1 (L) 07/17/2023    HCT 42.4 07/17/2023    MCV 90 07/17/2023     07/17/2023      Lab Results   Component Value Date     (H) 07/17/2023     07/17/2023    K 3.6 07/17/2023     07/17/2023    CO2 30 (H) 07/17/2023    BUN 8 07/17/2023    CREATININE 0.9 07/17/2023    CALCIUM 9.7 07/17/2023    MG 1.9 04/26/2022    " ALT 13 07/17/2023    AST 18 07/17/2023    ALBUMIN 2.9 (L) 07/17/2023    BILITOT 0.6 07/17/2023    BILIDIR 0.2 02/04/2006       ASSESSMENT/PLAN:     Sedation Plan: Local  Patient will undergo PICC placement.    Lauro Blackwood MD  Interventional Radiology  Department of Radiology

## 2023-07-25 NOTE — PROCEDURES
Radiology Post-Procedure Note    Pre Op Diagnosis: Osteomyelitis  Post Op Diagnosis: Same    Procedure: PICC placement    Procedure performed by: Lauro Blackwood MD    Written Informed Consent Obtained: Yes  Specimen Removed: NO  Estimated Blood Loss: Minimal    Findings:   Left upper extremity PICC placed via the left brachial vein, cut at 52 cm. Ready for immediate use.     Patient tolerated procedure well.    Lauro Blackwood MD  Interventional Radiology  Department of Radiology

## 2023-07-25 NOTE — PLAN OF CARE
Chart reviewed. Preop nursing care completed per orders. Safe surgery checklist complete. Pt denies any open wounds cuts or sores. Pt denies any metal in body. Belongings placed in PACU locker. Waiting for updated H&P and surgical consents prior to surgery. Pt AAOX3, VSS on room air. Pt toileted, Bed locked in lowest position, Call light within reach. Pt denies any needs at this time.

## 2023-07-25 NOTE — PROGRESS NOTES
[facility]    The patient was seen in the Infusion Clinic for a first dose of Daptomycin 800 mg IV daily. Tolerated well and extensions placed to his double lumen PICC. [facility] procedure was reviewed with the patient and he will come to the Infusion Clinic at [facility] weekly labs and dressing changes to his PICC line. Medication was delivered to the patient.    [facility]  Rola Clark RN, Clinical Educator  Cell [phone]  Office [phone]  Fax [phone]

## 2023-07-25 NOTE — Clinical Note
Left: Arm.   Scrubbed with ChloroPrep With Tint.    Hair: N/A.  Skin prep dry before draping.  Prepped by: Thierno Jackson Jr., [facility] 7/25/2023 11:22 AM.

## 2023-07-26 ENCOUNTER — TELEPHONE (OUTPATIENT)
Dept: PHARMACY | Facility: CLINIC | Age: 53
End: 2023-07-26
Payer: MEDICARE

## 2023-07-27 ENCOUNTER — OFFICE VISIT (OUTPATIENT)
Dept: INTERNAL MEDICINE | Facility: CLINIC | Age: 53
End: 2023-07-27
Payer: MEDICARE

## 2023-07-27 VITALS
DIASTOLIC BLOOD PRESSURE: 74 MMHG | OXYGEN SATURATION: 97 % | WEIGHT: 226 LBS | HEART RATE: 95 BPM | SYSTOLIC BLOOD PRESSURE: 120 MMHG | HEIGHT: 73 IN | BODY MASS INDEX: 29.95 KG/M2

## 2023-07-27 DIAGNOSIS — I10 ESSENTIAL HYPERTENSION: ICD-10-CM

## 2023-07-27 DIAGNOSIS — Z79.4 TYPE 2 DIABETES MELLITUS WITH HYPERGLYCEMIA, WITH LONG-TERM CURRENT USE OF INSULIN: ICD-10-CM

## 2023-07-27 DIAGNOSIS — M86.272 SUBACUTE OSTEOMYELITIS OF LEFT FOOT: ICD-10-CM

## 2023-07-27 DIAGNOSIS — E11.65 TYPE 2 DIABETES MELLITUS WITH HYPERGLYCEMIA, WITH LONG-TERM CURRENT USE OF INSULIN: ICD-10-CM

## 2023-07-27 DIAGNOSIS — Z01.818 PREOP EXAMINATION: Primary | ICD-10-CM

## 2023-07-27 DIAGNOSIS — L97.529 DIABETIC ULCER OF TOE OF LEFT FOOT ASSOCIATED WITH DIABETES MELLITUS OF OTHER TYPE, UNSPECIFIED ULCER STAGE: ICD-10-CM

## 2023-07-27 DIAGNOSIS — E13.621 DIABETIC ULCER OF TOE OF LEFT FOOT ASSOCIATED WITH DIABETES MELLITUS OF OTHER TYPE, UNSPECIFIED ULCER STAGE: ICD-10-CM

## 2023-07-27 DIAGNOSIS — E78.2 MIXED HYPERLIPIDEMIA: ICD-10-CM

## 2023-07-27 PROCEDURE — 99213 OFFICE O/P EST LOW 20 MIN: CPT | Mod: PBBFAC | Performed by: INTERNAL MEDICINE

## 2023-07-27 PROCEDURE — 99214 PR OFFICE/OUTPT VISIT, EST, LEVL IV, 30-39 MIN: ICD-10-PCS | Mod: S$PBB,,, | Performed by: INTERNAL MEDICINE

## 2023-07-27 PROCEDURE — 99999 PR PBB SHADOW E&M-EST. PATIENT-LVL III: CPT | Mod: PBBFAC,,, | Performed by: INTERNAL MEDICINE

## 2023-07-27 PROCEDURE — 99999 PR PBB SHADOW E&M-EST. PATIENT-LVL III: ICD-10-PCS | Mod: PBBFAC,,, | Performed by: INTERNAL MEDICINE

## 2023-07-27 PROCEDURE — 99214 OFFICE O/P EST MOD 30 MIN: CPT | Mod: S$PBB,,, | Performed by: INTERNAL MEDICINE

## 2023-07-27 RX ORDER — ATORVASTATIN CALCIUM 80 MG/1
80 TABLET, FILM COATED ORAL DAILY
Qty: 90 TABLET | Refills: 3
Start: 2023-07-27 | End: 2023-10-25 | Stop reason: SDUPTHER

## 2023-07-27 NOTE — Clinical Note
Preop examination  ECG 7- Normal sinus rhythm  T wave abnormality, consider inferior ischemia  When compared with ECG of 14-APR-2022 11:23,  No significant change was found   Nuclear Stress Test 2- ·  Normal myocardial perfusion scan.   Impression:  Moderate risk for cardiopulmonary complications due to DM2 and hyperlipidemia                           (Negative stress test in 2022)  Recommend:  Proceed to surgery as scheduled.                        Hold Jardiance day of surgery.                        Only give 13 units of Levemir the night before surgery.                        Hold Ozempic 5 days before surgery.

## 2023-07-27 NOTE — PROGRESS NOTES
INTERNAL MEDICINE CLINIC - SAME DAY APPOINTMENT  Progress Note    PRESENTING HISTORY     PCP: Lorena Thakur MD    Chief Complaint/Reason for Visit:     Chief Complaint   Patient presents with    Pre-op Exam      History of Present Illness & ROS : Mr. Indio Ryder Jr. is a 53 y.o. male.      Surgery: Dr Peterson for toe amputation 7/31/2023.  On IV daptomycin for osteomyelitis per PICC.    BS at home  at home.    Review of Systems:  Review of Systems   Constitutional:  Negative for chills and fever.   Respiratory:  Negative for shortness of breath.    Cardiovascular:  Negative for chest pain and leg swelling.   Gastrointestinal:  Negative for abdominal pain.   Skin:  Negative for rash.       PAST HISTORY:     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    Right foot    Colon adenocarcinoma 04/12/2022    Diabetic ketoacidosis without coma associated with type 2 diabetes mellitus 05/30/2017    Diabetic ulcer of right foot associated with type 2 diabetes mellitus 06/03/2015    Disorder of kidney and ureter     Essential hypertension 06/06/2013    Group B streptococcal infection 01/13/2017    Mixed hyperlipidemia 08/12/2014    Septic arthritis of left foot 04/28/2021    Shoulder impingement 08/12/2014    Subacute osteomyelitis of right foot 01/12/2017    Streptococcus agalactiae, Actinomyces odontolyticus    Traumatic amputation of fifth toe of right foot 07/02/2015    Type 2 diabetes mellitus with diabetic neuropathy, with long-term current use of insulin 05/03/2016       Past Surgical History:   Procedure Laterality Date    COLONOSCOPY Right 1/19/2022    Procedure: COLONOSCOPY;  Surgeon: Tj Haile MD;  Location: [address];  Service: Endoscopy;  Laterality: Right;  previous order un-usable/poor prep 1/18/22  RAPID COVID test arrival 12:20  instructions handed to pt- sm    COLONOSCOPY N/A 3/21/2022    Procedure: COLONOSCOPY;  Surgeon: Sheng Haque MD;  Location:  [facility] (2ND FLR);  Service: Endoscopy;  Laterality: N/A;  Colonoscopy with AES for hepatix flexure polyp removal, 2nd floor  Pt is fully vaccinated-DS  Pt prescribed Plavix by Dr. Stahl in Jan. 2022, however pt states that he never started taking it-DS  3/16/22-Instructions sent via portal-DS    DEBRIDEMENT OF FOOT Left 7/14/2019    Procedure: DEBRIDEMENT, FOOT, with left 5th ray amputation;  Surgeon: Sis Hickman DPM;  Location: [facility] OR 2ND FLR;  Service: Podiatry;  Laterality: Left;    DEBRIDEMENT OF FOOT Left 7/17/2019    Procedure: DEBRIDEMENT, FOOT with leftt 5th ray partial amputation with Neox Graft;  Surgeon: Mai Burrell DPM;  Location: [facility] OR 2ND FLR;  Service: Podiatry;  Laterality: Left;  t    DEBRIDEMENT OF FOOT Bilateral 4/29/2021    Procedure: DEBRIDEMENT, FOOT;  Surgeon: Mai Burrell DPM;  Location: [facility] OR 1ST FLR;  Service: Podiatry;  Laterality: Bilateral;  Graft application    TOE AMPUTATION Right 06/05/2015    TOE AMPUTATION Right 01/19/2017    XI ROBOTIC COLECTOMY, RIGHT N/A 4/25/2022    Procedure: XI ROBOTIC COLECTOMY, RIGHT (lithotomy, eras low);  Surgeon: Erik Stout MD;  Location: [facility] OR 2ND FLR;  Service: Colon and Rectal;  Laterality: N/A;  Paruch to Goodwin    XI ROBOTIC COLECTOMY, RIGHT  4/25/2022    Procedure: ;  Surgeon: Erik Stout MD;  Location: [facility] OR 2ND FLR;  Service: Colon and Rectal;;       Family History   Adopted: Yes   Problem Relation Age of Onset    Hypertension Mother     Cancer Mother         breast    Diabetes Mother     Hypertension Father     Cataracts Father     Heart disease Father         mi    Diabetes Sister     No Known Problems Brother     Heart disease Sister         MI    No Known Problems Sister     Hypertension Maternal Aunt     No Known Problems Maternal Uncle     No Known Problems Paternal Aunt     No Known Problems Paternal Uncle     No Known Problems Maternal Grandmother     No Known Problems Maternal  Grandfather     No Known Problems Paternal Grandmother     No Known Problems Paternal Grandfather     Amblyopia Neg Hx     Blindness Neg Hx     Glaucoma Neg Hx     Macular degeneration Neg Hx     Retinal detachment Neg Hx     Strabismus Neg Hx     Stroke Neg Hx     Thyroid disease Neg Hx        Social History     Socioeconomic History    Marital status: Single    Number of children: 0   Occupational History    Occupation: Retired     Employer: [employer]   Tobacco Use    Smoking status: Never    Smokeless tobacco: Never   Substance and Sexual Activity    Alcohol use: No    Drug use: No    Sexual activity: Yes     Partners: Female     Birth control/protection: Condom     Social Determinants of Health     Financial Resource Strain: High Risk    Difficulty of Paying Living Expenses: Very hard   Food Insecurity: No Food Insecurity    Worried About Running Out of Food in the Last Year: Never true    Ran Out of Food in the Last Year: Never true   Transportation Needs: No Transportation Needs    Lack of Transportation (Medical): No    Lack of Transportation (Non-Medical): No   Physical Activity: Inactive    Days of Exercise per Week: 0 days    Minutes of Exercise per Session: 0 min   Stress: No Stress Concern Present    Feeling of Stress : Not at all   Social Connections: Moderately Isolated    Frequency of Communication with Friends and Family: More than three times a week    Frequency of Social Gatherings with Friends and Family: More than three times a week    Attends Worship Services: More than 4 times per year    Active Member of Clubs or Organizations: No    Attends Club or Organization Meetings: Never    Marital Status:    Housing Stability: Low Risk     Unable to Pay for Housing in the Last Year: No    Number of Places Lived in the Last Year: 1    Unstable Housing in the Last Year: No       MEDICATIONS & ALLERGIES:     Current Outpatient Medications on File Prior to Visit   Medication Sig Dispense  "Refill    empagliflozin (JARDIANCE) 10 mg tablet Take 1 tablet (10 mg total) by mouth once daily. 30 tablet 6    insulin detemir U-100 (LEVEMIR FLEXTOUCH) 100 unit/mL (3 mL) SubQ InPn pen Inject 26 Units into the skin every evening. 15 mL 6    ONETOUCH DELICA LANCETS 33 gauge Misc       ONETOUCH ULTRA2 kit       pen needle, diabetic (BD SASCHA 2ND GEN PEN NEEDLE) 32 gauge x 5/32" Ndle Use 4 times a day (Patient taking differently: Use 4 times a day) 400 each 3    semaglutide (OZEMPIC) 1 mg/dose (4 mg/3 mL) Inject 1 mg into the skin every 7 days. 3 pen 3           insulin aspart U-100 (NOVOLOG) 100 unit/mL (3 mL) InPn pen Inject 8 units before meals plus scale 150-200+2, 201-250+4, 251-300+6, 301-350+8, >350+10. Max daily 54 units. (Patient not taking: Reported on 7/27/2023) 15 mL 6            Review of patient's allergies indicates:  No Known Allergies    Medications Reconciliation:   I have reconciled the patient's home medications with the patient/family. I have updated all changes.  Refer to After-Visit Medication List.    OBJECTIVE:     Vital Signs:  Vitals:    07/27/23 1406   BP: 120/74   Pulse: 95     Wt Readings from Last 3 Encounters:   07/27/23 1406 102.5 kg (225 lb 15.5 oz)   07/25/23 1028 101.6 kg (224 lb)   07/25/23 1431 100.3 kg (221 lb 1.9 oz)     Body mass index is 29.81 kg/m².     Physical Exam:  General: Well developed, well nourished. No distress.  HEENT: Head is normocephalic, atraumatic   Eyes: Clear conjunctiva.  Neck: Supple, symmetrical neck; trachea midline.  Lungs: Clear to auscultation bilaterally and normal respiratory effort.  Cardiovascular: Heart with regular rate and rhythm.    Extremities: :Left foot wrapped. Right foot without swelling.  Abdomen: Abdomen is soft, non-tender non-distended with normal bowel sounds  Psychiatric: Normal affect. Alert.      Laboratory  Lab Results   Component Value Date    WBC 3.98 07/17/2023    HGB 13.1 (L) 07/17/2023    HCT 42.4 07/17/2023     " 07/17/2023    CHOL 181 01/13/2023    TRIG 92 01/13/2023    HDL 49 01/13/2023    ALT 13 07/17/2023    AST 18 07/17/2023     07/17/2023    K 3.6 07/17/2023     07/17/2023    CREATININE 0.9 07/17/2023    BUN 8 07/17/2023    CO2 30 (H) 07/17/2023    TSH 1.136 03/01/2012    PSA 0.65 08/11/2014    INR 1.0 07/24/2023    HGBA1C 10.1 (H) 01/13/2023       ASSESSMENT & PLAN:     Preop examination    ECG 7-  Normal sinus rhythm   T wave abnormality, consider inferior ischemia   Abnormal ECG   When compared with ECG of 14-APR-2022 11:23,   No significant change was found     Nuclear Stress Test 2-    Normal myocardial perfusion scan. There is no evidence of myocardial ischemia or infarction.    There is a  mild intensity fixed perfusion abnormality in the inferior wall of the left ventricle secondary to diaphragm attenuation.    The gated perfusion images showed an ejection fraction of 50% at rest.    The EKG portion of this study is negative for ischemia.    The patient reported no chest pain during the stress test.    There were no arrhythmias during stress.    Impression:  Moderate risk for cardiopulmonary complications due to DM2 and hyperlipidemia.                           (Negative stress test in 2022)    Recommend:  Proceed to surgery as scheduled.                         Hold Jardiance day of surgery.                         Only give 13 units of Levemir the night before surgery.                         Hold Ozempic 5 days before surgery.    Essential hypertension  - Controlled without meds currently.    Type 2 diabetes mellitus with hyperglycemia, with long-term current use of insulin  - On Ozempic weekly, Detemir 26 nightly and Jardiance 10 mg daily.    Blood glucose has improved to normal.   He will check A1c with next blood draw.    Mixed hyperlipidemia  -     atorvastatin (LIPITOR) 80 MG tablet; Take 1 tablet (80 mg total) by mouth once daily.  Dispense: 90 tablet; Refill: 3    Diabetic  "ulcer of toe of left foot associated with diabetes mellitus of other type, unspecified ulcer stage  Subacute osteomyelitis of left foot  - On IV daptomycin currently.     Will get amputation.      Scheduled Follow-up :  Future Appointments   Date Time Provider Department Center   7/28/2023 11:00 AM Ruth Sandoval MD [facility] WOUND [facility]   8/11/2023 10:00 AM Damien Richey Jr., PA [facility] ID [address]   10/24/2023  3:30 PM JESUS Bernardo, FNP [facility] IM [address] [facility]   10/30/2023 10:00 AM Lorena Thakur MD [facility] IM [address] [facility]       After Visit Medication List :     Medication List            Accurate as of July 27, 2023  2:22 PM. If you have any questions, ask your nurse or doctor.                START taking these medications      atorvastatin 80 MG tablet  Commonly known as: LIPITOR  Take 1 tablet (80 mg total) by mouth once daily.  Started by: Fadi James MD            CONTINUE taking these medications      BD ULTRA-FINE SASCHA PEN NEEDLE 32 gauge x 5/32" Ndle  Generic drug: pen needle, diabetic  Use 4 times a day     insulin aspart U-100 100 unit/mL (3 mL) Inpn pen  Commonly known as: NovoLOG  Inject 8 units before meals plus scale 150-200+2, 201-250+4, 251-300+6, 301-350+8, >350+10. Max daily 54 units.     JARDIANCE 10 mg tablet  Generic drug: empagliflozin  Take 1 tablet (10 mg total) by mouth once daily.     LEVEMIR FLEXTOUCH U100 INSULIN 100 unit/mL (3 mL) Inpn pen  Generic drug: insulin detemir U-100 (Levemir)  Inject 26 Units into the skin every evening.     ONETOUCH DELICA LANCETS 33 gauge Misc  Generic drug: lancets     ONETOUCH ULTRA2 METER kit  Generic drug: blood-glucose meter     OZEMPIC 1 mg/dose (4 mg/3 mL)  Generic drug: semaglutide  Inject 1 mg into the skin every 7 days.            STOP taking these medications      acetaminophen 325 MG tablet  Commonly known as: TYLENOL  Stopped by: Fadi James MD     amoxicillin-clavulanate 875-125mg 875-125 mg per tablet  Commonly known as: " AUGMENTIN  Stopped by: Fadi James MD     diphenoxylate-atropine 2.5-0.025 mg 2.5-0.025 mg per tablet  Commonly known as: LOMOTIL  Stopped by: Fadi James MD     doxycycline 100 MG Cap  Commonly known as: VIBRAMYCIN  Stopped by: Fadi James MD     fluticasone propionate 50 mcg/actuation nasal spray  Commonly known as: FLONASE  Stopped by: Fadi James MD     pantoprazole 40 MG tablet  Commonly known as: PROTONIX  Stopped by: Fadi James MD     sodium,potassium,mag sulfates 17.5-3.13-1.6 gram Solr  Commonly known as: SUPREP BOWEL PREP KIT  Stopped by: Fadi James MD               Where to Get Your Medications        Information about where to get these medications is not yet available    Ask your nurse or doctor about these medications  atorvastatin 80 MG tablet         Signing Physician:  Fadi James MD

## 2023-07-27 NOTE — PATIENT INSTRUCTIONS
When you go for blood test next week ask them to draw Hemoglobin A1c (for diabetes) - it has been ordered already.

## 2023-07-28 ENCOUNTER — HOSPITAL ENCOUNTER (OUTPATIENT)
Dept: WOUND CARE | Facility: HOSPITAL | Age: 53
Discharge: HOME | End: 2023-07-28
Attending: FAMILY MEDICINE
Payer: MEDICARE

## 2023-07-28 VITALS — TEMPERATURE: 98 F | SYSTOLIC BLOOD PRESSURE: 139 MMHG | DIASTOLIC BLOOD PRESSURE: 74 MMHG | HEART RATE: 96 BPM

## 2023-07-28 DIAGNOSIS — L97.426 DIABETIC ULCER OF LEFT MIDFOOT ASSOCIATED WITH TYPE 2 DIABETES MELLITUS, WITH BONE INVOLVEMENT WITHOUT EVIDENCE OF NECROSIS: Primary | ICD-10-CM

## 2023-07-28 DIAGNOSIS — E11.621 DIABETIC ULCER OF LEFT MIDFOOT ASSOCIATED WITH TYPE 2 DIABETES MELLITUS, WITH BONE INVOLVEMENT WITHOUT EVIDENCE OF NECROSIS: Primary | ICD-10-CM

## 2023-07-28 PROCEDURE — 99214 PR OFFICE/OUTPT VISIT, EST, LEVL IV, 30-39 MIN: ICD-10-PCS | Mod: 25,,, | Performed by: FAMILY MEDICINE

## 2023-07-28 PROCEDURE — 11042 DBRDMT SUBQ TIS 1ST 20SQCM/<: CPT | Mod: ,,, | Performed by: FAMILY MEDICINE

## 2023-07-28 PROCEDURE — 99214 OFFICE O/P EST MOD 30 MIN: CPT | Mod: 25,,, | Performed by: FAMILY MEDICINE

## 2023-07-28 PROCEDURE — 11042 DBRDMT SUBQ TIS 1ST 20SQCM/<: CPT | Performed by: FAMILY MEDICINE

## 2023-07-28 PROCEDURE — 11042 DEBRIDEMENT: ICD-10-PCS | Mod: ,,, | Performed by: FAMILY MEDICINE

## 2023-07-28 NOTE — PROGRESS NOTES
[facility]                Progress Note    Subjective:       Patient ID: Indio Ryder Jr. is a 53 y.o. male.    Chief Complaint: Wound Check    HPI  Pt ambulated to room without assistance. Dressing clean and intact. Wound is improving and decreasing in size.   Review of Systems   Constitutional:  Negative for activity change, appetite change and fever.   HENT:  Negative for congestion.    Respiratory:  Negative for shortness of breath and wheezing.    Cardiovascular:  Negative for chest pain and leg swelling.   Gastrointestinal:  Negative for abdominal pain, nausea and vomiting.   Skin:  Positive for wound.   Neurological:  Negative for dizziness and headaches.   Psychiatric/Behavioral:  The patient is not nervous/anxious.          Objective:        Physical Exam  Vitals and nursing note reviewed.   Constitutional:       Appearance: He is well-developed.   HENT:      Head: Normocephalic and atraumatic.   Eyes:      Conjunctiva/sclera: Conjunctivae normal.   Pulmonary:      Effort: Pulmonary effort is normal.   Abdominal:      General: There is no distension.      Palpations: Abdomen is soft.   Musculoskeletal:         General: Normal range of motion.      Cervical back: Normal range of motion and neck supple.   Skin:     Comments: See wound description   Neurological:      Mental Status: He is alert and oriented to person, place, and time.   Psychiatric:         Behavior: Behavior normal.         Vitals:    07/28/23 1101   BP: 139/74   Pulse: 96   Temp: 98.2 °F (36.8 °C)       Assessment:           ICD-10-CM ICD-9-CM   1. Diabetic ulcer of left midfoot associated with type 2 diabetes mellitus, with bone involvement without evidence of necrosis  E11.621 250.80    L97.426 707.14            (Retired) Wound 04/08/22 1137 Diabetic Ulcer Left plantar;lateral Foot (Active)   04/08/22 1137    Pre-existing: Yes   Primary Wound Type: Diabetic ulc   Side: Left   Orientation:  plantar;lateral   Location: Foot   Wound Number:    Ankle-Brachial Index:    Pulses:    Removal Indication and Assessment:    Wound Outcome:    (Retired) Wound Type:    (Retired) Wound Length (cm):    (Retired) Wound Width (cm):    (Retired) Depth (cm):    Wound Description (Comments):    Removal Indications:    Wound Image   07/28/23 1114   Wound WDL ex 07/28/23 1114   Dressing Appearance Dry;Intact;Clean 07/28/23 1114   Drainage Amount Moderate 07/28/23 1114   Drainage Characteristics/Odor Serosanguineous 07/28/23 1114   Appearance Pink;Red;White;Yellow 07/28/23 1114   Tissue loss description Full thickness 07/28/23 1114   Black (%), Wound Tissue Color 0 % 07/28/23 1114   Red (%), Wound Tissue Color 95 % 07/28/23 1114   Yellow (%), Wound Tissue Color 5 % 07/28/23 1114   Periwound Area Macerated 07/28/23 1114   Wound Edges Open 07/28/23 1114   Wound Length (cm) 4.3 cm 07/28/23 1114   Wound Width (cm) 2.3 cm 07/28/23 1114   Wound Depth (cm) 0.3 cm 07/28/23 1114   Wound Volume (cm^3) 2.967 cm^3 07/28/23 1114   Wound Surface Area (cm^2) 9.89 cm^2 07/28/23 1114   Tunneling (depth (cm)/location) 0 07/28/23 1114   Undermining (depth (cm)/location) 0 07/28/23 1114   Care Sterile normal saline;Wound cleanser 07/28/23 1114   Dressing Applied;Methylene blue/gentian violet;Other (comment) 07/28/23 1114   Compression Two layer compression 07/28/23 1114   Off Loading Football dressing;Off loading shoe 07/28/23 1114   Dressing Change Due 08/04/23 07/28/23 1114           Debridement    Date/Time: 7/28/2023 11:00 AM    Performed by: Ruth Sandoval MD  Authorized by: Ruth Sandoval MD    Consent Done?:  Yes (Verbal)  Local anesthesia used?: No      Wound Details:    Location:  Left foot    Location:  Left Plantar    Type of Debridement:  Excisional       Length (cm):  4.3       Area (sq cm):  9.89       Width (cm):  2.3       Percent Debrided (%):  100       Depth (cm):  0.3       Total Area Debrided (sq cm):  9.89    Depth of  debridement:  Subcutaneous tissue    Tissue debrided:  Subcutaneous    Devitalized tissue debrided:  Slough and Fibrin    Instruments:  Curette  Patient tolerance:  Patient tolerated the procedure well with no immediate complications      Plan:            Debridement needed and Done today.   Recommend off-loading   Increase protein   Elevate legs   Monitor blood glucose   Keep dressing clean and intact  Continue with wound care orders and plan as noted in orders.   Continue to follow current medication regimen as per pcp   Call for any questions / concerns.       Tissue pathology and/or culture taken     [] Yes      [x] No  Sharp debridement performed                   [x] Yes       [] No  Labs ordered     [] Yes       [x] No  Imaging ordered    [] Yes      [x] No    Orders Placed This Encounter   Procedures    Change dressing     Wound Dressing Orders    Dressing change frequency once a week  Remove old dressing  Cleanse or irrigate with: Normal Saline   Protect periwound with:  periwound: Gentian Violet   Primary dressing - adjust to fit: WOUND BASE: custom pad with small hole to allow drainage, urgotul ag to wound bed cut to fit  Secondary dressing: drawtex cut in half backed with mextra 5x5, domenico foam long X1 horizontal   Off-load: football dressing (cast padding X3) and CAM walker boot  Compression: Coflex - TLC XL        Follow up in about 1 week (around 8/4/2023) for wound care.

## 2023-07-28 NOTE — PRE-PROCEDURE INSTRUCTIONS
PreOp Instructions given:   - Verbal medication information (what to hold and what to take)   - NPO guidelines   - Arrival place directions given; time to be given the day before procedure by the   Surgeon's Office DOSC   - Bathing with antibacterial soap   - Don't wear any jewelry or bring any valuables AM of surgery   - No makeup or moisturizer to face   - No perfume/cologne, powder, lotions or aftershave   Pt. verbalized understanding.   Pt denies any h/o Anesthesia/Sedation complications or side effects.  Patient does not know arrival time.  Explained that this information comes from the surgeon's office and if they haven't heard from them by 2 or 3 pm to call the office.  Patient stated an understanding.

## 2023-07-31 ENCOUNTER — HOSPITAL ENCOUNTER (OUTPATIENT)
Facility: HOSPITAL | Age: 53
Discharge: HOME | End: 2023-07-31
Attending: PODIATRIST | Admitting: PODIATRIST
Payer: MEDICARE

## 2023-07-31 ENCOUNTER — INFUSION (OUTPATIENT)
Dept: INFECTIOUS DISEASES | Facility: HOSPITAL | Age: 53
End: 2023-07-31
Attending: PODIATRIST
Payer: MEDICARE

## 2023-07-31 ENCOUNTER — ANESTHESIA EVENT (OUTPATIENT)
Dept: SURGERY | Facility: HOSPITAL | Age: 53
End: 2023-07-31
Payer: MEDICARE

## 2023-07-31 ENCOUNTER — ANESTHESIA (OUTPATIENT)
Dept: SURGERY | Facility: HOSPITAL | Age: 53
End: 2023-07-31
Payer: MEDICARE

## 2023-07-31 VITALS
SYSTOLIC BLOOD PRESSURE: 134 MMHG | DIASTOLIC BLOOD PRESSURE: 85 MMHG | OXYGEN SATURATION: 100 % | BODY MASS INDEX: 29.95 KG/M2 | TEMPERATURE: 97 F | WEIGHT: 226 LBS | HEART RATE: 70 BPM | RESPIRATION RATE: 15 BRPM | HEIGHT: 73 IN

## 2023-07-31 VITALS
HEIGHT: 73 IN | WEIGHT: 226 LBS | SYSTOLIC BLOOD PRESSURE: 148 MMHG | HEART RATE: 72 BPM | OXYGEN SATURATION: 98 % | BODY MASS INDEX: 29.95 KG/M2 | RESPIRATION RATE: 18 BRPM | TEMPERATURE: 99 F | DIASTOLIC BLOOD PRESSURE: 86 MMHG

## 2023-07-31 DIAGNOSIS — M86.10 ACUTE ON CHRONIC OSTEOMYELITIS: ICD-10-CM

## 2023-07-31 DIAGNOSIS — M86.60 ACUTE ON CHRONIC OSTEOMYELITIS: Primary | ICD-10-CM

## 2023-07-31 DIAGNOSIS — E11.621 DIABETIC ULCER OF LEFT MIDFOOT ASSOCIATED WITH TYPE 2 DIABETES MELLITUS, WITH BONE INVOLVEMENT WITHOUT EVIDENCE OF NECROSIS: Primary | ICD-10-CM

## 2023-07-31 DIAGNOSIS — E11.621 DIABETIC ULCER OF RIGHT MIDFOOT ASSOCIATED WITH TYPE 2 DIABETES MELLITUS, WITH NECROSIS OF BONE: ICD-10-CM

## 2023-07-31 DIAGNOSIS — L97.426 DIABETIC ULCER OF LEFT MIDFOOT ASSOCIATED WITH TYPE 2 DIABETES MELLITUS, WITH BONE INVOLVEMENT WITHOUT EVIDENCE OF NECROSIS: Primary | ICD-10-CM

## 2023-07-31 DIAGNOSIS — M86.10 ACUTE ON CHRONIC OSTEOMYELITIS: Primary | ICD-10-CM

## 2023-07-31 DIAGNOSIS — M86.60 ACUTE ON CHRONIC OSTEOMYELITIS: ICD-10-CM

## 2023-07-31 DIAGNOSIS — Z51.81 THERAPEUTIC DRUG MONITORING: ICD-10-CM

## 2023-07-31 DIAGNOSIS — L97.414 DIABETIC ULCER OF RIGHT MIDFOOT ASSOCIATED WITH TYPE 2 DIABETES MELLITUS, WITH NECROSIS OF BONE: ICD-10-CM

## 2023-07-31 LAB
ALBUMIN SERPL BCP-MCNC: 2.8 G/DL (ref 3.5–5.2)
ALP SERPL-CCNC: 176 U/L (ref 55–135)
ALT SERPL W/O P-5'-P-CCNC: 22 U/L (ref 10–44)
ANION GAP SERPL CALC-SCNC: 8 MMOL/L (ref 8–16)
AST SERPL-CCNC: 22 U/L (ref 10–40)
BASOPHILS # BLD AUTO: 0.02 K/UL (ref 0–0.2)
BASOPHILS NFR BLD: 0.5 % (ref 0–1.9)
BILIRUB SERPL-MCNC: 0.5 MG/DL (ref 0.1–1)
BUN SERPL-MCNC: 13 MG/DL (ref 6–20)
CALCIUM SERPL-MCNC: 8.8 MG/DL (ref 8.7–10.5)
CHLORIDE SERPL-SCNC: 107 MMOL/L (ref 95–110)
CK SERPL-CCNC: 186 U/L (ref 20–200)
CO2 SERPL-SCNC: 26 MMOL/L (ref 23–29)
CREAT SERPL-MCNC: 1 MG/DL (ref 0.5–1.4)
CRP SERPL-MCNC: 4.6 MG/L (ref 0–8.2)
DIFFERENTIAL METHOD: ABNORMAL
EOSINOPHIL # BLD AUTO: 0.1 K/UL (ref 0–0.5)
EOSINOPHIL NFR BLD: 3.6 % (ref 0–8)
ERYTHROCYTE [DISTWIDTH] IN BLOOD BY AUTOMATED COUNT: 14.6 % (ref 11.5–14.5)
EST. GFR  (NO RACE VARIABLE): >60 ML/MIN/1.73 M^2
GLUCOSE SERPL-MCNC: 150 MG/DL (ref 70–110)
GRAM STN SPEC: NORMAL
GRAM STN SPEC: NORMAL
HCT VFR BLD AUTO: 37.4 % (ref 40–54)
HGB BLD-MCNC: 11.7 G/DL (ref 14–18)
IMM GRANULOCYTES # BLD AUTO: 0 K/UL (ref 0–0.04)
IMM GRANULOCYTES NFR BLD AUTO: 0 % (ref 0–0.5)
LYMPHOCYTES # BLD AUTO: 2.1 K/UL (ref 1–4.8)
LYMPHOCYTES NFR BLD: 52 % (ref 18–48)
MCH RBC QN AUTO: 28.3 PG (ref 27–31)
MCHC RBC AUTO-ENTMCNC: 31.3 G/DL (ref 32–36)
MCV RBC AUTO: 90 FL (ref 82–98)
MONOCYTES # BLD AUTO: 0.3 K/UL (ref 0.3–1)
MONOCYTES NFR BLD: 7.6 % (ref 4–15)
NEUTROPHILS # BLD AUTO: 1.4 K/UL (ref 1.8–7.7)
NEUTROPHILS NFR BLD: 36.3 % (ref 38–73)
NRBC BLD-RTO: 0 /100 WBC
PLATELET # BLD AUTO: 270 K/UL (ref 150–450)
PMV BLD AUTO: 10.5 FL (ref 9.2–12.9)
POCT GLUCOSE: 182 MG/DL (ref 70–110)
POTASSIUM SERPL-SCNC: 3.8 MMOL/L (ref 3.5–5.1)
PROT SERPL-MCNC: 7.2 G/DL (ref 6–8.4)
RBC # BLD AUTO: 4.14 M/UL (ref 4.6–6.2)
SODIUM SERPL-SCNC: 141 MMOL/L (ref 136–145)
WBC # BLD AUTO: 3.94 K/UL (ref 3.9–12.7)

## 2023-07-31 PROCEDURE — 88311 DECALCIFY TISSUE: CPT | Mod: 26,,, | Performed by: PATHOLOGY

## 2023-07-31 PROCEDURE — 88311 PR  DECALCIFY TISSUE: ICD-10-PCS | Mod: 26,,, | Performed by: PATHOLOGY

## 2023-07-31 PROCEDURE — C1713 ANCHOR/SCREW BN/BN,TIS/BN: HCPCS | Performed by: PODIATRIST

## 2023-07-31 PROCEDURE — 87206 SMEAR FLUORESCENT/ACID STAI: CPT | Performed by: PODIATRIST

## 2023-07-31 PROCEDURE — D9220A PRA ANESTHESIA: Mod: ANES,,, | Performed by: STUDENT IN AN ORGANIZED HEALTH CARE EDUCATION/TRAINING PROGRAM

## 2023-07-31 PROCEDURE — D9220A PRA ANESTHESIA: Mod: CRNA,,, | Performed by: NURSE ANESTHETIST, CERTIFIED REGISTERED

## 2023-07-31 PROCEDURE — 25000003 PHARM REV CODE 250: Performed by: PODIATRIST

## 2023-07-31 PROCEDURE — 63600175 PHARM REV CODE 636 W HCPCS: Performed by: NURSE ANESTHETIST, CERTIFIED REGISTERED

## 2023-07-31 PROCEDURE — 71000044 HC DOSC ROUTINE RECOVERY FIRST HOUR: Performed by: PODIATRIST

## 2023-07-31 PROCEDURE — 87075 CULTR BACTERIA EXCEPT BLOOD: CPT | Performed by: PODIATRIST

## 2023-07-31 PROCEDURE — 71000015 HC POSTOP RECOV 1ST HR: Performed by: PODIATRIST

## 2023-07-31 PROCEDURE — 88305 TISSUE EXAM BY PATHOLOGIST: CPT | Performed by: PATHOLOGY

## 2023-07-31 PROCEDURE — 88305 TISSUE EXAM BY PATHOLOGIST: ICD-10-PCS | Mod: 26,,, | Performed by: PATHOLOGY

## 2023-07-31 PROCEDURE — 85025 COMPLETE CBC W/AUTO DIFF WBC: CPT | Performed by: PHYSICIAN ASSISTANT

## 2023-07-31 PROCEDURE — 11047 DBRDMT BONE EACH ADDL: CPT | Mod: ,,, | Performed by: PODIATRIST

## 2023-07-31 PROCEDURE — 82550 ASSAY OF CK (CPK): CPT | Performed by: PHYSICIAN ASSISTANT

## 2023-07-31 PROCEDURE — 36000706: Performed by: PODIATRIST

## 2023-07-31 PROCEDURE — 20700 MNL PREP&INSJ DP RX DLVR DEV: CPT | Mod: ,,, | Performed by: PODIATRIST

## 2023-07-31 PROCEDURE — 37000009 HC ANESTHESIA EA ADD 15 MINS: Performed by: PODIATRIST

## 2023-07-31 PROCEDURE — 37000008 HC ANESTHESIA 1ST 15 MINUTES: Performed by: PODIATRIST

## 2023-07-31 PROCEDURE — 87186 SC STD MICRODIL/AGAR DIL: CPT | Performed by: PODIATRIST

## 2023-07-31 PROCEDURE — 86140 C-REACTIVE PROTEIN: CPT | Performed by: PHYSICIAN ASSISTANT

## 2023-07-31 PROCEDURE — 88305 TISSUE EXAM BY PATHOLOGIST: CPT | Mod: 26,,, | Performed by: PATHOLOGY

## 2023-07-31 PROCEDURE — 11044 WOUND DEBRIDEMENT: ICD-10-PCS | Mod: ,,, | Performed by: PODIATRIST

## 2023-07-31 PROCEDURE — 63600175 PHARM REV CODE 636 W HCPCS: Performed by: PODIATRIST

## 2023-07-31 PROCEDURE — 27201423 OPTIME MED/SURG SUP & DEVICES STERILE SUPPLY: Performed by: PODIATRIST

## 2023-07-31 PROCEDURE — 87077 CULTURE AEROBIC IDENTIFY: CPT | Performed by: PODIATRIST

## 2023-07-31 PROCEDURE — 82962 GLUCOSE BLOOD TEST: CPT | Performed by: PODIATRIST

## 2023-07-31 PROCEDURE — 36415 COLL VENOUS BLD VENIPUNCTURE: CPT | Performed by: PHYSICIAN ASSISTANT

## 2023-07-31 PROCEDURE — 36000707: Performed by: PODIATRIST

## 2023-07-31 PROCEDURE — 87070 CULTURE OTHR SPECIMN AEROBIC: CPT | Performed by: PODIATRIST

## 2023-07-31 PROCEDURE — 25000003 PHARM REV CODE 250: Performed by: NURSE ANESTHETIST, CERTIFIED REGISTERED

## 2023-07-31 PROCEDURE — 87015 SPECIMEN INFECT AGNT CONCNTJ: CPT | Performed by: PODIATRIST

## 2023-07-31 PROCEDURE — 87116 MYCOBACTERIA CULTURE: CPT | Performed by: PODIATRIST

## 2023-07-31 PROCEDURE — D9220A PRA ANESTHESIA: ICD-10-PCS | Mod: CRNA,,, | Performed by: NURSE ANESTHETIST, CERTIFIED REGISTERED

## 2023-07-31 PROCEDURE — 11044 DBRDMT BONE 1ST 20 SQ CM/<: CPT | Mod: ,,, | Performed by: PODIATRIST

## 2023-07-31 PROCEDURE — 11047 WOUND DEBRIDEMENT: ICD-10-PCS | Mod: ,,, | Performed by: PODIATRIST

## 2023-07-31 PROCEDURE — 20700 PR MANUAL PREP AND INSERTION DEEP DRUG DELIVERY DEV: ICD-10-PCS | Mod: ,,, | Performed by: PODIATRIST

## 2023-07-31 PROCEDURE — D9220A PRA ANESTHESIA: ICD-10-PCS | Mod: ANES,,, | Performed by: STUDENT IN AN ORGANIZED HEALTH CARE EDUCATION/TRAINING PROGRAM

## 2023-07-31 PROCEDURE — 80053 COMPREHEN METABOLIC PANEL: CPT | Performed by: PHYSICIAN ASSISTANT

## 2023-07-31 PROCEDURE — 87102 FUNGUS ISOLATION CULTURE: CPT | Performed by: PODIATRIST

## 2023-07-31 PROCEDURE — 87205 SMEAR GRAM STAIN: CPT | Performed by: PODIATRIST

## 2023-07-31 PROCEDURE — 36592 COLLECT BLOOD FROM PICC: CPT

## 2023-07-31 PROCEDURE — 63600175 PHARM REV CODE 636 W HCPCS: Performed by: INTERNAL MEDICINE

## 2023-07-31 DEVICE — KIT GRAFT BONE/SUB STIM 10ML: Type: IMPLANTABLE DEVICE | Site: FOOT | Status: FUNCTIONAL

## 2023-07-31 RX ORDER — PROPOFOL 10 MG/ML
VIAL (ML) INTRAVENOUS CONTINUOUS PRN
Status: DISCONTINUED | OUTPATIENT
Start: 2023-07-31 | End: 2023-07-31

## 2023-07-31 RX ORDER — HYDROCODONE BITARTRATE AND ACETAMINOPHEN 5; 325 MG/1; MG/1
1 TABLET ORAL EVERY 4 HOURS PRN
Status: DISCONTINUED | OUTPATIENT
Start: 2023-07-31 | End: 2023-07-31 | Stop reason: HOSPADM

## 2023-07-31 RX ORDER — FENTANYL CITRATE 50 UG/ML
INJECTION, SOLUTION INTRAMUSCULAR; INTRAVENOUS
Status: DISCONTINUED | OUTPATIENT
Start: 2023-07-31 | End: 2023-07-31

## 2023-07-31 RX ORDER — SODIUM CHLORIDE 0.9 % (FLUSH) 0.9 %
10 SYRINGE (ML) INJECTION
Status: CANCELLED | OUTPATIENT
Start: 2023-07-31

## 2023-07-31 RX ORDER — BUPIVACAINE HYDROCHLORIDE 5 MG/ML
INJECTION, SOLUTION EPIDURAL; INTRACAUDAL
Status: DISCONTINUED | OUTPATIENT
Start: 2023-07-31 | End: 2023-07-31 | Stop reason: HOSPADM

## 2023-07-31 RX ORDER — DEXMEDETOMIDINE HYDROCHLORIDE 100 UG/ML
INJECTION, SOLUTION INTRAVENOUS
Status: DISCONTINUED | OUTPATIENT
Start: 2023-07-31 | End: 2023-07-31

## 2023-07-31 RX ORDER — HYDROCODONE BITARTRATE AND ACETAMINOPHEN 5; 325 MG/1; MG/1
1 TABLET ORAL EVERY 6 HOURS PRN
Qty: 15 TABLET | Refills: 0 | Status: SHIPPED | OUTPATIENT
Start: 2023-07-31 | End: 2023-10-24

## 2023-07-31 RX ORDER — LIDOCAINE HYDROCHLORIDE 10 MG/ML
INJECTION, SOLUTION EPIDURAL; INFILTRATION; INTRACAUDAL; PERINEURAL
Status: DISCONTINUED | OUTPATIENT
Start: 2023-07-31 | End: 2023-07-31 | Stop reason: HOSPADM

## 2023-07-31 RX ORDER — HEPARIN 100 UNIT/ML
500 SYRINGE INTRAVENOUS
Status: DISCONTINUED | OUTPATIENT
Start: 2023-07-31 | End: 2023-07-31 | Stop reason: HOSPADM

## 2023-07-31 RX ORDER — TOBRAMYCIN 40 MG/ML
INJECTION INTRAMUSCULAR; INTRAVENOUS
Status: DISCONTINUED | OUTPATIENT
Start: 2023-07-31 | End: 2023-07-31 | Stop reason: HOSPADM

## 2023-07-31 RX ORDER — VANCOMYCIN HYDROCHLORIDE 1 G/20ML
INJECTION, POWDER, LYOPHILIZED, FOR SOLUTION INTRAVENOUS
Status: DISCONTINUED | OUTPATIENT
Start: 2023-07-31 | End: 2023-07-31 | Stop reason: HOSPADM

## 2023-07-31 RX ORDER — MIDAZOLAM HYDROCHLORIDE 1 MG/ML
INJECTION, SOLUTION INTRAMUSCULAR; INTRAVENOUS
Status: DISCONTINUED | OUTPATIENT
Start: 2023-07-31 | End: 2023-07-31

## 2023-07-31 RX ORDER — SODIUM CHLORIDE 0.9 % (FLUSH) 0.9 %
10 SYRINGE (ML) INJECTION
Status: DISCONTINUED | OUTPATIENT
Start: 2023-07-31 | End: 2023-07-31 | Stop reason: HOSPADM

## 2023-07-31 RX ORDER — HEPARIN 100 UNIT/ML
500 SYRINGE INTRAVENOUS
Status: CANCELLED | OUTPATIENT
Start: 2023-07-31

## 2023-07-31 RX ADMIN — MIDAZOLAM HYDROCHLORIDE 2 MG: 1 INJECTION, SOLUTION INTRAMUSCULAR; INTRAVENOUS at 12:07

## 2023-07-31 RX ADMIN — SODIUM CHLORIDE: 0.9 INJECTION, SOLUTION INTRAVENOUS at 12:07

## 2023-07-31 RX ADMIN — DEXMEDETOMIDINE 4 MCG: 100 INJECTION, SOLUTION, CONCENTRATE INTRAVENOUS at 01:07

## 2023-07-31 RX ADMIN — PROPOFOL 25 MCG/KG/MIN: 10 INJECTION, EMULSION INTRAVENOUS at 12:07

## 2023-07-31 RX ADMIN — DEXMEDETOMIDINE 4 MCG: 100 INJECTION, SOLUTION, CONCENTRATE INTRAVENOUS at 12:07

## 2023-07-31 RX ADMIN — HEPARIN 500 UNITS: 100 SYRINGE at 04:07

## 2023-07-31 RX ADMIN — DEXMEDETOMIDINE 8 MCG: 100 INJECTION, SOLUTION, CONCENTRATE INTRAVENOUS at 02:07

## 2023-07-31 RX ADMIN — FENTANYL CITRATE 25 MCG: 50 INJECTION, SOLUTION INTRAMUSCULAR; INTRAVENOUS at 12:07

## 2023-07-31 RX ADMIN — FENTANYL CITRATE 25 MCG: 50 INJECTION, SOLUTION INTRAMUSCULAR; INTRAVENOUS at 02:07

## 2023-07-31 RX ADMIN — CEFAZOLIN 2 G: 2 INJECTION, POWDER, FOR SOLUTION INTRAMUSCULAR; INTRAVENOUS at 12:07

## 2023-07-31 RX ADMIN — FENTANYL CITRATE 25 MCG: 50 INJECTION, SOLUTION INTRAMUSCULAR; INTRAVENOUS at 01:07

## 2023-07-31 NOTE — ANESTHESIA POSTPROCEDURE EVALUATION
Anesthesia Post Evaluation    Patient: Indio Ryder Jr.    Procedure(s) Performed: Procedure(s) (LRB):  DEBRIDEMENT, FOOT (Left)    Final Anesthesia Type: general      Patient location during evaluation: PACU  Patient participation: Yes- Able to Participate  Level of consciousness: awake  Post-procedure vital signs: reviewed and stable  Pain management: adequate  Airway patency: patent    PONV status at discharge: No PONV  Anesthetic complications: no      Cardiovascular status: blood pressure returned to baseline  Respiratory status: unassisted, spontaneous ventilation and room air            Vitals Value Taken Time   /108 07/31/23 1547   Temp 36.2 °C (97.2 °F) 07/31/23 1545   Pulse 68 07/31/23 1548   Resp 17 07/31/23 1548   SpO2 100 % 07/31/23 1548   Vitals shown include unvalidated device data.      No case tracking events are documented in the log.      Pain/Jenny Score: Jenny Score: 10 (7/31/2023  3:51 PM)

## 2023-07-31 NOTE — BRIEF OP NOTE
Agustin [facility]goza - Surgery ([facility])  Brief Operative Note    Surgery Date: 7/31/2023     Surgeon(s) and Role:     * Marivel Peterson DPM - Primary    Assisting Surgeon: None    Pre-op Diagnosis:  Diabetic ulcer of left midfoot associated with type 2 diabetes mellitus, with bone involvement without evidence of necrosis [E11.621, L97.426]  Acute on chronic osteomyelitis [M86.10, M86.60]    Post-op Diagnosis:  Post-Op Diagnosis Codes:     * Diabetic ulcer of left midfoot associated with type 2 diabetes mellitus, with bone involvement without evidence of necrosis [E11.621, L97.426]     * Acute on chronic osteomyelitis [M86.10, M86.60]    Procedure(s) (LRB):  DEBRIDEMENT, FOOT (Left)    Anesthesia: local MAC    Operative Findings: soft, discolored bones of the midfoot    Estimated Blood Loss: less than 50mL         Specimens: bone of the left midfoot sent for culture and pathology        Discharge Note    OUTCOME: Patient tolerated treatment/procedure well without complication and is now ready for discharge.    DISPOSITION: Home or Self Care    FINAL DIAGNOSIS:  Acute on chronic osteomyelitis [M86.10, M86.60]    FOLLOWUP: In clinic ([facility])    DISCHARGE INSTRUCTIONS:    Discharge Procedure Orders   Diet general     Order Specific Question Answer Comments   Additional restrictions: Diabetic 2000      Keep surgical extremity elevated     Ice to affected area     Leave dressing on - Keep it clean, dry, and intact until clinic visit     Call MD for:  temperature >100.4     Call MD for:  persistent nausea and vomiting     Call MD for:  severe uncontrolled pain     Call MD for:  difficulty breathing, headache or visual disturbances     Call MD for:  redness, tenderness, or signs of infection (pain, swelling, redness, odor or green/yellow discharge around incision site)     Call MD for:  hives     Call MD for:  persistent dizziness or light-headedness     Weight bearing restrictions (specify)   Order Comments: Non  weightbearing to surgical limb

## 2023-07-31 NOTE — PATIENT INSTRUCTIONS
"POST-OPERATIVE INSTRUCTIONS FOR PODIATRY PATIENTS     ** Keep your dressing clean, dry, intact. Do not remove or change the bandage.   ** Keep your foot elevated to the level above your heart. You should recline as far as possible (When your toes are at eye level you're in the right position).   ** You may apply an ice pack to the top of your ankle at 10-15 minute intervals. At least 3 times daily for the next week. Make sure this does not get the dressing wet.   ** Take your pain medication as instructed for the first 24 hours after surgery, then progress to using them only when you need it.   ** Stay off your foot as much as possible. You may get up to eat, use the bathroom, etc.   No long standing or walking on your foot. Do not sit with your feet dangling.   ** Wear surgical shoe at all times   ** Eat your regular diabetic diet and drink plenty of fluids.     If any of the following conditions are present, call the Podiatry   Clinic [phone]  immediately, or call [facility] and ask to page "Podiatry on Call" at night or on the weekend.   a. fever 101° or higher   b. pain that is not controlled by pain medication   c. red, warm, swollen feet with red streaks going up your legs   d. cold, blue, numb toes, especially if you are wearing a cast     Make sure to follow up with your post-op appointment as scheduled.         "

## 2023-07-31 NOTE — PLAN OF CARE
Discharge instructions reviewed with pt and niece with Dr Peterson at bedside, verbalized understanding

## 2023-07-31 NOTE — ANESTHESIA PREPROCEDURE EVALUATION
Pre-operative evaluation for Procedure(s) (LRB):  AMPUTATION, TOE (Left)    Indio Ryder Jr. is a 53 y.o. male w/ HTN, PVD, colon cancer s/p resection (2022), and IDDM2 c/b osteomyelitis of L toe - has been on IV Daptomycin outpatient via LUE PICC. He presents for above procedure.       Prev airway (4/25/22):    Induction:  Intravenous    Intubated:  Postinduction    Mask Ventilation:  Moderately difficult with oral airway    Attempts:  1    Attempted By:  Resident anesthesiologist    Method of Intubation:  Direct    Blade:  Mayito 3    Laryngeal View Grade: Grade IIA - cords partially seen      Difficult Airway Encountered?: No      Complications:  None    Airway Device:  Oral endotracheal tube    Airway Device Size:  8.0    Style/Cuff Inflation:  Cuffed    Tube secured:  22    Secured at:  The teeth    Placement Verified By:  Capnometry    Complicating Factors:  None    Findings Post-Intubation:  BS equal bilateral    EKG (7/17/23):  Vent. Rate : 091 BPM     Atrial Rate : 091 BPM      P-R Int : 142 ms          QRS Dur : 096 ms       QT Int : 390 ms       P-R-T Axes : 073 081 011 degrees      QTc Int : 479 ms     Normal sinus rhythm   T wave abnormality, consider inferior ischemia     Nuclear Stress Test (2/28/22):    Normal myocardial perfusion scan. There is no evidence of myocardial ischemia or infarction.    There is a  mild intensity fixed perfusion abnormality in the inferior wall of the left ventricle secondary to diaphragm attenuation.    The gated perfusion images showed an ejection fraction of 50% at rest.    The EKG portion of this study is negative for ischemia.    The patient reported no chest pain during the stress test.    There were no arrhythmias during stress.      2D Echo (2/22/22):    Eccentric hypertrophy and low normal systolic function.   Mild to moderate left atrial enlargement.   Mild tricuspid regurgitation.   The estimated ejection fraction is 50%.   Grade I left  ventricular diastolic dysfunction.   Normal right ventricular size with normal right ventricular systolic function.   Mild right atrial enlargement.   Normal central venous pressure (3 mmHg).   The estimated PA systolic pressure is 21 mmHg.      Patient Active Problem List   Diagnosis    Essential hypertension    Mixed hyperlipidemia    Type 2 diabetes mellitus with hyperglycemia, with long-term current use of insulin    Actinomyces infection    Hypokalemia    Neck pain    Diabetic ulcer of right midfoot associated with type 2 diabetes mellitus, with necrosis of bone    Severe malnutrition    Diabetic ulcer of left midfoot associated with type 2 diabetes mellitus, with bone involvement without evidence of necrosis    Hypertensive retinopathy, bilateral    Subacute osteomyelitis of left foot    Allergic reaction due to antibacterial drug    Chronic left shoulder pain    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     Current Outpatient Medications on File Prior to Encounter   Medication Sig Dispense Refill    empagliflozin (JARDIANCE) 10 mg tablet Take 1 tablet (10 mg total) by mouth once daily. 30 tablet 6    insulin detemir U-100 (LEVEMIR FLEXTOUCH) 100 unit/mL (3 mL) SubQ InPn pen Inject 26 Units into the skin every evening. 15 mL 6    insulin aspart U-100 (NOVOLOG) 100 unit/mL (3 mL) InPn pen Inject 8 units before meals plus scale 150-200+2, 201-250+4,  "251-300+6, 301-350+8, >350+10. Max daily 54 units. (Patient not taking: Reported on 7/27/2023) 15 mL 6    ONETOUCH DELICA LANCETS 33 gauge Misc       ONETOUCH ULTRA2 kit       pen needle, diabetic (BD SASCHA 2ND GEN PEN NEEDLE) 32 gauge x 5/32" Ndle Use 4 times a day (Patient taking differently: Use 4 times a day) 400 each 3    semaglutide (OZEMPIC) 1 mg/dose (4 mg/3 mL) Inject 1 mg into the skin every 7 days. 3 pen 3       Past Surgical History:   Procedure Laterality Date    COLONOSCOPY Right 1/19/2022    Procedure: COLONOSCOPY;  Surgeon: Tj Haile MD;  Location: [facility] (4TH FLR);  Service: Endoscopy;  Laterality: Right;  previous order un-usable/poor prep 1/18/22  RAPID COVID test arrival 12:20  instructions handed to pt- sm    COLONOSCOPY N/A 3/21/2022    Procedure: COLONOSCOPY;  Surgeon: Sheng Haque MD;  Location: [facility] ENDO (2ND FLR);  Service: Endoscopy;  Laterality: N/A;  Colonoscopy with AES for hepatix flexure polyp removal, 2nd floor  Pt is fully vaccinated-DS  Pt prescribed Plavix by Dr. Stahl in Jan. 2022, however pt states that he never started taking it-DS  3/16/22-Instructions sent via portal-DS    DEBRIDEMENT OF FOOT Left 7/14/2019    Procedure: DEBRIDEMENT, FOOT, with left 5th ray amputation;  Surgeon: Sis Hickman DPM;  Location: [facility] OR 2ND FLR;  Service: Podiatry;  Laterality: Left;    DEBRIDEMENT OF FOOT Left 7/17/2019    Procedure: DEBRIDEMENT, FOOT with leftt 5th ray partial amputation with Neox Graft;  Surgeon: Mai Burrell DPM;  Location: [facility] OR 2ND FLR;  Service: Podiatry;  Laterality: Left;  t    DEBRIDEMENT OF FOOT Bilateral 4/29/2021    Procedure: DEBRIDEMENT, FOOT;  Surgeon: Mai Burrell DPM;  Location: [facility] OR 1ST FLR;  Service: Podiatry;  Laterality: Bilateral;  Graft application    TOE AMPUTATION Right 06/05/2015    TOE AMPUTATION Right 01/19/2017    XI ROBOTIC COLECTOMY, RIGHT N/A 4/25/2022    Procedure: XI ROBOTIC COLECTOMY, RIGHT " (lithotomy, eras low);  Surgeon: Erik Stout MD;  Location: [facility] OR 2ND FLR;  Service: Colon and Rectal;  Laterality: N/A;  Paruch to [place]    XI ROBOTIC COLECTOMY, RIGHT  4/25/2022    Procedure: ;  Surgeon: Erik Stout MD;  Location: [facility] OR 2ND FLR;  Service: Colon and Rectal;;       Social History     Socioeconomic History    Marital status: Single    Number of children: 0   Occupational History    Occupation: Retired     Employer: [employer]   Tobacco Use    Smoking status: Never    Smokeless tobacco: Never   Substance and Sexual Activity    Alcohol use: No    Drug use: No    Sexual activity: Yes     Partners: Female     Birth control/protection: Condom     Social Determinants of Health     Financial Resource Strain: High Risk (7/13/2023)    Overall Financial Resource Strain (CARDIA)     Difficulty of Paying Living Expenses: Very hard   Food Insecurity: No Food Insecurity (7/13/2023)    Hunger Vital Sign     Worried About Running Out of Food in the Last Year: Never true     Ran Out of Food in the Last Year: Never true   Transportation Needs: No Transportation Needs (7/13/2023)    PRAPARE - Transportation     Lack of Transportation (Medical): No     Lack of Transportation (Non-Medical): No   Physical Activity: Inactive (7/13/2023)    Exercise Vital Sign     Days of Exercise per Week: 0 days     Minutes of Exercise per Session: 0 min   Stress: No Stress Concern Present (7/13/2023)    [place] [employer] - Occupational Stress Questionnaire     Feeling of Stress : Not at all   Social Connections: Moderately Isolated (7/13/2023)    Social Connection and Isolation Panel [NHANES]     Frequency of Communication with Friends and Family: More than three times a week     Frequency of Social Gatherings with Friends and Family: More than three times a week     Attends Orthodoxy Services: More than 4 times per year     Active Member of Clubs or Organizations: No  "    Attends Club or Organization Meetings: Never     Marital Status:    Housing Stability: Low Risk  (7/13/2023)    Housing Stability Vital Sign     Unable to Pay for Housing in the Last Year: No     Number of Places Lived in the Last Year: 1     Unstable Housing in the Last Year: No         Vital Signs Range (Last 24H):  Temp:  [36.5 °C (97.7 °F)]   Pulse:  [86]   Resp:  [16]   BP: (160)/(93)   SpO2:  [97 %]       CBC: No results for input(s): "WBC", "RBC", "HGB", "HCT", "PLT", "MCV", "MCH", "MCHC" in the last 72 hours.    CMP: No results for input(s): "NA", "K", "CL", "CO2", "BUN", "CREATININE", "GLU", "MG", "PHOS", "CALCIUM", "ALBUMIN", "PROT", "ALKPHOS", "ALT", "AST", "BILITOT" in the last 72 hours.    INR  No results for input(s): "PT", "INR", "PROTIME", "APTT" in the last 72 hours.                Pre-op Assessment    I have reviewed the Patient Summary Reports.     I have reviewed the Nursing Notes. I have reviewed the NPO Status.   I have reviewed the Medications.     Review of Systems  Anesthesia Hx:   Denies Personal Hx of Anesthesia complications.   Hematology/Oncology:         -- Cancer in past history:   Cardiovascular:   Hypertension    Pulmonary:  Pulmonary Normal    Renal/:   Chronic Renal Disease    Hepatic/GI:  Hepatic/GI Normal    Neurological:  Neurology Normal    Endocrine:   Diabetes, using insulin        Physical Exam  General: Alert and Oriented    Airway:  Mallampati: II   Mouth Opening: Normal  TM Distance: Normal  Tongue: Normal    Dental:  Intact    Chest/Lungs:  Clear to auscultation, Normal Respiratory Rate    Heart:  Rate: Normal  Rhythm: Regular Rhythm        Anesthesia Plan  Type of Anesthesia, risks & benefits discussed:    Anesthesia Type: MAC, Regional  Intra-op Monitoring Plan: Standard ASA Monitors  Post Op Pain Control Plan: IV/PO Opioids PRN and multimodal analgesia  Induction:  IV  Informed Consent: Informed consent signed with the Patient and all parties " understand the risks and agree with anesthesia plan.  All questions answered.   ASA Score: 3  Day of Surgery Review of History & Physical: H&P Update referred to the surgeon/provider.    Ready For Surgery From Anesthesia Perspective.     .

## 2023-07-31 NOTE — TRANSFER OF CARE
"Anesthesia Transfer of Care Note    Patient: Indio Ryder Jr.    Procedure(s) Performed: Procedure(s) (LRB):  DEBRIDEMENT, FOOT (Left)    Patient location: PACU    Anesthesia Type: general    Transport from OR: Transported from OR on 2-3 L/min O2 by NC with adequate spontaneous ventilation    Post pain: adequate analgesia    Post assessment: no apparent anesthetic complications    Post vital signs: stable    Level of consciousness: sedated and responds to stimulation    Nausea/Vomiting: no nausea/vomiting    Complications: none    Transfer of care protocol was followed      Last vitals:   Visit Vitals  BP (!) 160/93 (BP Location: Right arm, Patient Position: Lying)   Pulse 86   Temp 36.5 °C (97.7 °F) (Temporal)   Resp 16   Ht 6' 1" (1.854 m)   Wt 102.5 kg (225 lb 15.5 oz)   SpO2 97%   BMI 29.81 kg/m²     "

## 2023-07-31 NOTE — PROGRESS NOTES
Patient arrives for lab draw and dressing change to PICC line left upper medial arm - good blood flow - labs drawn without difficulty - patient dressing changed without signs of infection - small area of irritation approximately 1 cm below insertion site that appears to be tape sensitivity - sutures intact - no drainage - flushes well with sterile NS and heparin - patient has appointments for futre labs with dressing changes - left in no apparent distress

## 2023-08-01 NOTE — OP NOTE
[facility] - Podiatry   Operative Note  Surgical Debridement of Left Foot      Surgery Date: 7/31/2023     Surgeon(s) and Role:     * Marivel Bansal DPM - Primary    Assisting Surgeon: None    Pre-op Diagnosis:  Diabetic ulcer of left midfoot associated with type 2 diabetes mellitus, with bone involvement without evidence of necrosis [E11.621, L97.426]  Acute on chronic osteomyelitis [M86.10, M86.60]    Post-op Diagnosis:  Post-Op Diagnosis Codes:     * Diabetic ulcer of left midfoot associated with type 2 diabetes mellitus, with bone involvement without evidence of necrosis [E11.621, L97.426]     * Acute on chronic osteomyelitis [M86.10, M86.60]    Procedure(s) (LRB):  DEBRIDEMENT, FOOT (Left)    Anesthesia: local MAC     Operative Findings: soft, discolored bones of the midfoot     Estimated Blood Loss: less than 50mL              Specimens:   Specimen (24h ago, onward)       Start     Ordered    07/31/23 1438  Specimen to Pathology, Surgery Other  Once        Comments: Pre-op Diagnosis: Diabetic ulcer of left midfoot associated with type 2 diabetes mellitus, with bone involvement without evidence of necrosis [E11.621, L97.426]Acute on chronic osteomyelitis [M86.10, M86.60]Procedure(s):AMPUTATION, TOE Number of specimens: 1Name of specimens: 1.) midfoot bone - permanent     References:    Click here for ordering Quick Tip   Question Answer Comment   Procedure Type: Other    Specimen Class: Routine/Screening    Which provider would you like to cc? MARIVEL BANSAL        07/31/23 1437                  Hemostasis: Pneumatic ankle tourniquet for 58 minutes    Procedure in Detail:  The patient was seen in the Holding Room. The risks, benefits, complications, treatment options, and expected outcomes were discussed with the patient. The risks and potential complications of their problem and purposed treatment include but are not limited to infection, nerve injury, vascular injury, pain, potential skin necrosis, deep vein  thrombosis, possible pulmonary embolus, complications of the anesthetics and need for further amputation.  The patient concurred with the proposed plan, giving informed consent.  The site of surgery properly noted/marked. The patient was taken to Operating Room #17 identified as Indio ALVARADO Britni Anderson and the procedure verified as wound debridement of the left foot with potential fourth toe amputation. A Time Out was held and the above information confirmed.    The patient was brought to the operating room, placed on the operating table in a supine position. Following the successful induction of anesthesia, 9 ccs of a 1:1 mixture of 2% Lidocaine plain and 0.5% Bupivicaine plain was injected as a reverse alba block. The foot was then scrubbed, prepped, and draped in the usual aseptic manner.  The stockinette was then reflected and attention was directed to the lateral midfoot where there was a full-thickness ulceration to the plantar foot which probed through to the base of the metatarsal. MRI revealed signal hypointensity at the 4th metatarsal base, 3rd metatarsal base, and cuboid, with corresponding increased fluid sensitive signal intensity and cortical indistinctness.  Decision initially was made to attempt debridement of bone via dorsal incision. A #15 blade was used to make an incision down the bone in a linear fashion over the 4 metatarsal shaft and base. Incision was deepened with care to avoid neurovascular structures. Significant scarring of the soft tissue with difficulty of clear exposure of affected structures without causing excessive trauma.  At this time decision was made to debride wound from plantar approach.    Wound Debridement    Date/Time: 7/31/2023 1:00 PM    Performed by: Marievl Peterson DPM  Authorized by: Marivel Peterson DPM      Wound Details:    Location:  Left foot    Location:  Left Midfoot (4th and 3rd metatarsal bases, plantar)    Type of Debridement:  Excisional       Length (cm):  6.7        Area (sq cm):  34.84       Width (cm):  5.2       Percent Debrided (%):  100       Depth (cm):  1       Total Area Debrided (sq cm):  34.84    Depth of debridement:  Bone    Tissue debrided:  Dermis, Epidermis, Subcutaneous, Muscle, Tendon, Cartilage and Bone    Devitalized tissue debrided:  Fibrin and Callus    Instruments:  Rongeur (sagittal saw)  Bleeding:  Moderate     4th met base excised in toto. Bone to the cuboid also appeared compromised. Rongeur used to debride bone without total excision    All removed bone and tissue was sent laboratory for bone culture and sensitivity as well as tissue pathology. The wound bed was debulked of any nonviable tissue. No purulent drainage or abscess noted. The proximal margin of the surgical site tissue was inspected and appeared viable. There was no malodor.  Next,  1 L of betadine impregnated sterile saline were instilled into the wound.    On the back table, stimulan antibiotic bead system mixed with vancomycin and tobramycin powder into medium beads.  Once hardened, absorbable beads placed to wound bed.    All skin was reapproximated and coapted with care utilizing 3-0 and 0 prolene in a simple interrupted suture technique. The patient tolerated the above anesthesia and surgery without apparent complications. A standard postoperative dressing was applied consisting of betadine soaked adaptic, gauze, 4x4s, Kerlix,  Cast padding and coban. The patient was transported via cart to Postanesthesia Care Unit with vital signs able and vascular status intact to foot. He will be discharged home. Plan is to continue the antibiotics until further ID recommendations.    He will be nonweightbearing to the left foot and use scooter, walker, or crutch assistance. He will elevate his left foot and rest the foot, keep it clean and dry. Wound clinic Follow up on Friday           Implants: none           Complications:  None; patient tolerated the procedure well.           Disposition:  [facility] - hemodynamically stable. Then home           Condition: stable       LUE/nonweight-bearing

## 2023-08-04 ENCOUNTER — HOSPITAL ENCOUNTER (OUTPATIENT)
Dept: WOUND CARE | Facility: HOSPITAL | Age: 53
Discharge: HOME | End: 2023-08-04
Attending: PODIATRIST
Payer: MEDICARE

## 2023-08-04 VITALS
WEIGHT: 225 LBS | DIASTOLIC BLOOD PRESSURE: 94 MMHG | TEMPERATURE: 98 F | HEIGHT: 73 IN | SYSTOLIC BLOOD PRESSURE: 164 MMHG | HEART RATE: 91 BPM | BODY MASS INDEX: 29.82 KG/M2

## 2023-08-04 DIAGNOSIS — E11.621 DIABETIC ULCER OF LEFT MIDFOOT ASSOCIATED WITH TYPE 2 DIABETES MELLITUS, WITH BONE INVOLVEMENT WITHOUT EVIDENCE OF NECROSIS: Primary | ICD-10-CM

## 2023-08-04 DIAGNOSIS — L97.426 DIABETIC ULCER OF LEFT MIDFOOT ASSOCIATED WITH TYPE 2 DIABETES MELLITUS, WITH BONE INVOLVEMENT WITHOUT EVIDENCE OF NECROSIS: Primary | ICD-10-CM

## 2023-08-04 DIAGNOSIS — M86.10 ACUTE ON CHRONIC OSTEOMYELITIS: ICD-10-CM

## 2023-08-04 DIAGNOSIS — M86.60 ACUTE ON CHRONIC OSTEOMYELITIS: ICD-10-CM

## 2023-08-04 LAB
BACTERIA SPEC ANAEROBE CULT: NORMAL
FINAL PATHOLOGIC DIAGNOSIS: NORMAL
Lab: NORMAL

## 2023-08-04 PROCEDURE — 99213 OFFICE O/P EST LOW 20 MIN: CPT | Mod: ,,, | Performed by: PODIATRIST

## 2023-08-04 PROCEDURE — 99214 OFFICE O/P EST MOD 30 MIN: CPT

## 2023-08-04 PROCEDURE — 99213 PR OFFICE/OUTPT VISIT, EST, LEVL III, 20-29 MIN: ICD-10-PCS | Mod: ,,, | Performed by: PODIATRIST

## 2023-08-04 NOTE — PROGRESS NOTES
[facility]                Progress Note    Subjective:       Patient ID: Indio Ryder Jr. is a 53 y.o. male.    Chief Complaint: Diabetic Foot Ulcer, Wound Care, and Post-op Evaluation    Patient returns to wound clinic for left foot wound.  He is postoperative day 4 status post debridement of bone with delayed primary closure.  Presented to clinic with scooter offloading the surgical limb.  Surgical dressing intact, no boot or shoe to the surgical limb. Tubigrip on right.  Following procedure patient went to stay with his sister and Sid and relates that he has basically been bed-bound with his meals being served to him.  He has no new pedal complains.  He denies nausea, vomiting, fever, chills.     Wound Check      Review of Systems   Constitutional:  Negative for appetite change, chills, fatigue and fever.   HENT:  Negative for hearing loss.    Eyes:  Negative for photophobia and visual disturbance.   Respiratory:  Negative for cough, chest tightness, shortness of breath and wheezing.    Cardiovascular:  Positive for leg swelling. Negative for chest pain and palpitations.   Gastrointestinal:  Negative for constipation, diarrhea, nausea and vomiting.   Endocrine: Negative for cold intolerance and heat intolerance.   Genitourinary:  Negative for flank pain.   Musculoskeletal:  Positive for gait problem and myalgias. Negative for neck pain and neck stiffness.   Skin:  Positive for wound. Negative for color change.   Neurological:  Positive for numbness. Negative for weakness, light-headedness and headaches.        + paresthesia    Psychiatric/Behavioral:  Negative for sleep disturbance.          Objective:        Physical Exam  Constitutional:       Appearance: He is well-developed.   Cardiovascular:      Comments: Dorsalis pedis and posterior tibial pulses are palpable Skin is atrophic, slightly hyperpigmented, and mildly edematous      Musculoskeletal:          General: No tenderness. Normal range of motion.      Comments: Muscle strength is 5/5 in all groups bilaterally.    S/p partial 5th ray amp b/l    S/p hallux amp right    Fat pad atrophy to heels and met heads bilateral       Skin:     General: Skin is warm and dry.      Coloration: Skin is not jaundiced, mottled or sallow.      Findings: Wound (see below) present. No abscess or ecchymosis.      Comments:        Neurological:      Mental Status: He is alert and oriented to person, place, and time.      Comments: [place] 5.07 monofilamant testing is diminished Kenji feet. Sharp/dull sensation diminished Bilaterally. Light touch absent Bilaterally.       Psychiatric:         Behavior: Behavior normal.         Vitals:    08/04/23 0838   BP: (!) 164/94   Pulse: 91   Temp: 98 °F (36.7 °C)       Assessment:           ICD-10-CM ICD-9-CM   1. Diabetic ulcer of left midfoot associated with type 2 diabetes mellitus, with bone involvement without evidence of necrosis  E11.621 250.80    L97.426 707.14   2. Acute on chronic osteomyelitis  M86.10 730.00    M86.60 730.10                  Plan:              Education about the prevention of limb loss.    Discussed wound healing cycle, skin integrity, ways to care for skin.Counseled patient on the effects of biomechanical pressure,  PVD, and blood glucose on healing. He verbalizes understanding that it can increase the chances of delayed healing and this prolonged exposure leads to infection or progression of infection which subsequently can result in loss of limb.    Adequate vitamin supplementation, protein intake, and hydration - discussed with patient    The wound is cleansed of foreign material as much as possible and the base inspected for bone or abscess.    Dorsal wound is well coapted without signs of hematoma or abscess.  Plantar wound with purposeful gapping and some serosanguineous drainage with milking and palpation but without signs of hematoma or  abscess.      Betadine-soaked Adaptic and 4x4s and a Emery compression dressing applied to the left lower extremity. He will continue to be primarily nonweightbearing to the surgical limb with scooter assisted ambulation.    Return to clinic in 1 week.    Short-term goals include maintaining good offloading and minimizing bioburden, promoting granulation and epithelialization to healing.  Long-term goals include keeping the wound healed by good offloading and medical management under the direction of internist.    Shoe inspection. Diabetic Foot Education. Patient reminded of the importance of good nutrition and blood sugar control to help prevent podiatric complications of diabetes. Patient instructed on proper foot hygeine. We discussed wearing proper shoe gear, daily foot inspections, never walking without protective shoe gear, never putting sharp instruments to feet.        Procedures      Tissue pathology and/or culture taken     [] Yes      [x] No  Sharp debridement performed                   [x] Yes       [] No  Labs ordered     [] Yes       [x] No  Imaging ordered    [] Yes      [x] No    No orders of the defined types were placed in this encounter.       No follow-ups on file.

## 2023-08-07 ENCOUNTER — INFUSION (OUTPATIENT)
Dept: INFECTIOUS DISEASES | Facility: HOSPITAL | Age: 53
End: 2023-08-07
Attending: INTERNAL MEDICINE
Payer: MEDICARE

## 2023-08-07 VITALS
HEIGHT: 73 IN | BODY MASS INDEX: 29.69 KG/M2 | TEMPERATURE: 98 F | OXYGEN SATURATION: 95 % | HEART RATE: 104 BPM | RESPIRATION RATE: 16 BRPM

## 2023-08-07 DIAGNOSIS — M86.60 ACUTE ON CHRONIC OSTEOMYELITIS: ICD-10-CM

## 2023-08-07 DIAGNOSIS — L97.414 DIABETIC ULCER OF RIGHT MIDFOOT ASSOCIATED WITH TYPE 2 DIABETES MELLITUS, WITH NECROSIS OF BONE: Primary | ICD-10-CM

## 2023-08-07 DIAGNOSIS — Z51.81 THERAPEUTIC DRUG MONITORING: ICD-10-CM

## 2023-08-07 DIAGNOSIS — E11.621 DIABETIC ULCER OF RIGHT MIDFOOT ASSOCIATED WITH TYPE 2 DIABETES MELLITUS, WITH NECROSIS OF BONE: Primary | ICD-10-CM

## 2023-08-07 DIAGNOSIS — M86.10 ACUTE ON CHRONIC OSTEOMYELITIS: ICD-10-CM

## 2023-08-07 LAB
ALBUMIN SERPL BCP-MCNC: 3 G/DL (ref 3.5–5.2)
ALP SERPL-CCNC: 180 U/L (ref 55–135)
ALT SERPL W/O P-5'-P-CCNC: 15 U/L (ref 10–44)
ANION GAP SERPL CALC-SCNC: 10 MMOL/L (ref 8–16)
AST SERPL-CCNC: 18 U/L (ref 10–40)
BASOPHILS # BLD AUTO: 0.04 K/UL (ref 0–0.2)
BASOPHILS NFR BLD: 0.8 % (ref 0–1.9)
BILIRUB SERPL-MCNC: 0.5 MG/DL (ref 0.1–1)
BUN SERPL-MCNC: 10 MG/DL (ref 6–20)
CALCIUM SERPL-MCNC: 9.7 MG/DL (ref 8.7–10.5)
CHLORIDE SERPL-SCNC: 103 MMOL/L (ref 95–110)
CK SERPL-CCNC: 100 U/L (ref 20–200)
CO2 SERPL-SCNC: 25 MMOL/L (ref 23–29)
CREAT SERPL-MCNC: 1 MG/DL (ref 0.5–1.4)
CRP SERPL-MCNC: 2 MG/L (ref 0–8.2)
DIFFERENTIAL METHOD: ABNORMAL
EOSINOPHIL # BLD AUTO: 0.1 K/UL (ref 0–0.5)
EOSINOPHIL NFR BLD: 2.9 % (ref 0–8)
ERYTHROCYTE [DISTWIDTH] IN BLOOD BY AUTOMATED COUNT: 14.9 % (ref 11.5–14.5)
EST. GFR  (NO RACE VARIABLE): >60 ML/MIN/1.73 M^2
GLUCOSE SERPL-MCNC: 198 MG/DL (ref 70–110)
HCT VFR BLD AUTO: 39.3 % (ref 40–54)
HGB BLD-MCNC: 12.2 G/DL (ref 14–18)
IMM GRANULOCYTES # BLD AUTO: 0.02 K/UL (ref 0–0.04)
IMM GRANULOCYTES NFR BLD AUTO: 0.4 % (ref 0–0.5)
LYMPHOCYTES # BLD AUTO: 1.8 K/UL (ref 1–4.8)
LYMPHOCYTES NFR BLD: 36.3 % (ref 18–48)
MCH RBC QN AUTO: 28.2 PG (ref 27–31)
MCHC RBC AUTO-ENTMCNC: 31 G/DL (ref 32–36)
MCV RBC AUTO: 91 FL (ref 82–98)
MONOCYTES # BLD AUTO: 0.3 K/UL (ref 0.3–1)
MONOCYTES NFR BLD: 5.7 % (ref 4–15)
NEUTROPHILS # BLD AUTO: 2.6 K/UL (ref 1.8–7.7)
NEUTROPHILS NFR BLD: 53.9 % (ref 38–73)
NRBC BLD-RTO: 0 /100 WBC
PLATELET # BLD AUTO: 293 K/UL (ref 150–450)
PMV BLD AUTO: 10.8 FL (ref 9.2–12.9)
POTASSIUM SERPL-SCNC: 3.9 MMOL/L (ref 3.5–5.1)
PROT SERPL-MCNC: 7.8 G/DL (ref 6–8.4)
RBC # BLD AUTO: 4.33 M/UL (ref 4.6–6.2)
SODIUM SERPL-SCNC: 138 MMOL/L (ref 136–145)
WBC # BLD AUTO: 4.87 K/UL (ref 3.9–12.7)

## 2023-08-07 PROCEDURE — 86140 C-REACTIVE PROTEIN: CPT | Performed by: PHYSICIAN ASSISTANT

## 2023-08-07 PROCEDURE — 36415 COLL VENOUS BLD VENIPUNCTURE: CPT | Performed by: PHYSICIAN ASSISTANT

## 2023-08-07 PROCEDURE — 82550 ASSAY OF CK (CPK): CPT | Performed by: PHYSICIAN ASSISTANT

## 2023-08-07 PROCEDURE — 80053 COMPREHEN METABOLIC PANEL: CPT | Performed by: PHYSICIAN ASSISTANT

## 2023-08-07 PROCEDURE — 63600175 PHARM REV CODE 636 W HCPCS: Performed by: INTERNAL MEDICINE

## 2023-08-07 PROCEDURE — 85025 COMPLETE CBC W/AUTO DIFF WBC: CPT | Performed by: PHYSICIAN ASSISTANT

## 2023-08-07 RX ORDER — SODIUM CHLORIDE 0.9 % (FLUSH) 0.9 %
10 SYRINGE (ML) INJECTION
Status: DISCONTINUED | OUTPATIENT
Start: 2023-08-07 | End: 2023-08-07 | Stop reason: HOSPADM

## 2023-08-07 RX ORDER — HEPARIN 100 UNIT/ML
500 SYRINGE INTRAVENOUS
Status: CANCELLED | OUTPATIENT
Start: 2023-08-07

## 2023-08-07 RX ORDER — HEPARIN 100 UNIT/ML
500 SYRINGE INTRAVENOUS
Status: DISCONTINUED | OUTPATIENT
Start: 2023-08-07 | End: 2023-08-07 | Stop reason: HOSPADM

## 2023-08-07 RX ORDER — SODIUM CHLORIDE 0.9 % (FLUSH) 0.9 %
10 SYRINGE (ML) INJECTION
Status: CANCELLED | OUTPATIENT
Start: 2023-08-07

## 2023-08-07 RX ADMIN — HEPARIN 500 UNITS: 100 SYRINGE at 02:08

## 2023-08-07 NOTE — PROGRESS NOTES
Pt in clinic today for ROBBIN PICC dressing change. Site looks well with no redness or swelling;  Dressing changed per sterile tecnique and hospital policies;  pt tolerated well;  labs also obtained from PICC per order, and hospital policy; labs labeled at pt side, and confirmed by pt; labs bagged and sent to lab via ; pt d/c'd from clinic and is advised to follow up with MD  or their home health in one week for next dressing change;

## 2023-08-11 ENCOUNTER — HOSPITAL ENCOUNTER (OUTPATIENT)
Dept: WOUND CARE | Facility: HOSPITAL | Age: 53
Discharge: HOME | End: 2023-08-11
Attending: PODIATRIST
Payer: MEDICARE

## 2023-08-11 VITALS — DIASTOLIC BLOOD PRESSURE: 92 MMHG | HEART RATE: 103 BPM | TEMPERATURE: 98 F | SYSTOLIC BLOOD PRESSURE: 178 MMHG

## 2023-08-11 DIAGNOSIS — M86.60 ACUTE ON CHRONIC OSTEOMYELITIS: ICD-10-CM

## 2023-08-11 DIAGNOSIS — M86.10 ACUTE ON CHRONIC OSTEOMYELITIS: ICD-10-CM

## 2023-08-11 DIAGNOSIS — L97.426 DIABETIC ULCER OF LEFT MIDFOOT ASSOCIATED WITH TYPE 2 DIABETES MELLITUS, WITH BONE INVOLVEMENT WITHOUT EVIDENCE OF NECROSIS: Primary | ICD-10-CM

## 2023-08-11 DIAGNOSIS — E11.621 DIABETIC ULCER OF LEFT MIDFOOT ASSOCIATED WITH TYPE 2 DIABETES MELLITUS, WITH BONE INVOLVEMENT WITHOUT EVIDENCE OF NECROSIS: Primary | ICD-10-CM

## 2023-08-11 DIAGNOSIS — E11.49 TYPE II DIABETES MELLITUS WITH NEUROLOGICAL MANIFESTATIONS: ICD-10-CM

## 2023-08-11 PROCEDURE — 99213 PR OFFICE/OUTPT VISIT, EST, LEVL III, 20-29 MIN: ICD-10-PCS | Mod: ,,, | Performed by: PODIATRIST

## 2023-08-11 PROCEDURE — 99214 OFFICE O/P EST MOD 30 MIN: CPT | Performed by: PODIATRIST

## 2023-08-11 PROCEDURE — 99213 OFFICE O/P EST LOW 20 MIN: CPT | Mod: ,,, | Performed by: PODIATRIST

## 2023-08-11 RX ORDER — DAPTOMYCIN 50 MG/ML
INJECTION, POWDER, LYOPHILIZED, FOR SOLUTION INTRAVENOUS
COMMUNITY
Start: 2023-08-08 | End: 2023-09-15 | Stop reason: ALTCHOICE

## 2023-08-11 NOTE — PROGRESS NOTES
[facility]                Progress Note    Subjective:       Patient ID: Indio Ryder Jr. is a 53 y.o. male.    Chief Complaint: Wound Check    Returns to wound clinic post Op follow up visit. Patient is approximately 2 weeks post surgical debridement with loose delayed primary closure. He walking to exam room using knee scooter for LLE without shoe or boot, and Right Foot in tennis shoe with lift. No c/o pain to wound beds at present. Denies fever, chills, nausea, vomiting or diarrhea. Continuing to receive IV ABX in [facility]. Dressing is intact with moderate amount of sanguineous and serosanguineous drainage, but no strike through noted.     Wound Check      Review of Systems   Constitutional:  Negative for appetite change, chills, fatigue and fever.   HENT:  Negative for hearing loss.    Eyes:  Negative for photophobia and visual disturbance.   Respiratory:  Negative for cough, chest tightness, shortness of breath and wheezing.    Cardiovascular:  Positive for leg swelling. Negative for chest pain and palpitations.   Gastrointestinal:  Negative for constipation, diarrhea, nausea and vomiting.   Endocrine: Negative for cold intolerance and heat intolerance.   Genitourinary:  Negative for flank pain.   Musculoskeletal:  Positive for gait problem and myalgias. Negative for neck pain and neck stiffness.   Skin:  Positive for wound. Negative for color change.   Neurological:  Positive for numbness. Negative for weakness, light-headedness and headaches.        + paresthesia    Psychiatric/Behavioral:  Negative for sleep disturbance.          Objective:        Physical Exam  Constitutional:       Appearance: He is well-developed.   Cardiovascular:      Comments: Dorsalis pedis and posterior tibial pulses are palpable Skin is atrophic, slightly hyperpigmented, and mildly edematous      Musculoskeletal:         General: No tenderness. Normal range of motion.       Comments: Muscle strength is 5/5 in all groups bilaterally.    S/p partial 5th ray amp b/l    S/p hallux amp right    Fat pad atrophy to heels and met heads bilateral       Skin:     General: Skin is warm and dry.      Coloration: Skin is not jaundiced, mottled or sallow.      Findings: Wound (see below) present. No abscess or ecchymosis.      Comments:        Neurological:      Mental Status: He is alert and oriented to person, place, and time.      Comments: [place]enita 5.07 monofilamant testing is diminished Kenji feet. Sharp/dull sensation diminished Bilaterally. Light touch absent Bilaterally.       Psychiatric:         Behavior: Behavior normal.         Vitals:    08/11/23 1108   BP: (!) 178/92   Pulse: 103   Temp: 97.9 °F (36.6 °C)       Assessment:           ICD-10-CM ICD-9-CM   1. Diabetic ulcer of left midfoot associated with type 2 diabetes mellitus, with bone involvement without evidence of necrosis  E11.621 250.80    L97.426 707.14   2. Acute on chronic osteomyelitis  M86.10 730.00    M86.60 730.10   3. Type II diabetes mellitus with neurological manifestations  E11.49 250.60            (Retired) Wound 04/08/22 1137 Diabetic Ulcer Left plantar;lateral Foot (Active)   04/08/22 1137    Pre-existing: Yes   Primary Wound Type: Diabetic ulc   Side: Left   Orientation: plantar;lateral   Location: Foot   Wound Number:    Ankle-Brachial Index:    Pulses:    Removal Indication and Assessment:    Wound Outcome:    (Retired) Wound Type:    (Retired) Wound Length (cm):    (Retired) Wound Width (cm):    (Retired) Depth (cm):    Wound Description (Comments):    Removal Indications:    Wound Image   08/11/23 1248   Wound WDL ex 08/11/23 1248   Dressing Appearance Intact;Moist drainage 08/11/23 1248   Drainage Amount Moderate 08/11/23 1248   Drainage Characteristics/Odor Serosanguineous;No odor 08/11/23 1248   Appearance Sutures intact 08/11/23 1248   Periwound Area Dry 08/11/23 1248   Wound Edges Defined 08/11/23  1248   Wound Length (cm) 5 cm 08/11/23 1248   Wound Width (cm) 0.4 cm 08/11/23 1248   Wound Depth (cm) 0.1 cm 08/11/23 1248   Wound Volume (cm^3) 0.2 cm^3 08/11/23 1248   Wound Surface Area (cm^2) 2 cm^2 08/11/23 1248   Tunneling (depth (cm)/location) 0 08/11/23 1248   Undermining (depth (cm)/location) 0 08/11/23 1248   Care Cleansed with:;Antimicrobial agent;Sterile normal saline 08/11/23 1248   Dressing Changed 08/11/23 1248   Periwound Care Absorptive dressing applied 08/11/23 1248   Compression Two layer compression 08/11/23 1248   Off Loading Football dressing 08/11/23 1248   Dressing Change Due 08/18/23 08/11/23 1248            Incision/Site 07/31/23 1405 Left Foot (Active)   07/31/23 1405   Present Prior to Hospital Arrival?:    Side: Left   Location: Foot   Orientation:    Incision Type:    Closure Method:    Additional Comments:    Removal Indication and Assessment:    Wound Outcome:    Removal Indications:    Wound Image   08/11/23 1248   Incision WDL ex 08/11/23 1248   Dressing Appearance Intact;Dried drainage 08/11/23 1248   Drainage Amount Moderate 08/11/23 1248   Drainage Characteristics/Odor Sanguineous;Bleeding controlled 08/11/23 1248   Appearance Sutures intact 08/11/23 1248   Periwound Area Dry 08/11/23 1248   Wound Edges Approximated 08/11/23 1248   Wound Length (cm) 5.8 cm 08/11/23 1248   Wound Width (cm) 0.1 cm 08/11/23 1248   Wound Depth (cm) 0 cm 08/11/23 1248   Wound Volume (cm^3) 0 cm^3 08/11/23 1248   Wound Surface Area (cm^2) 0.58 cm^2 08/11/23 1248   Tunneling (depth (cm)/location) 0 08/11/23 1248   Undermining (depth (cm)/location) 0 08/11/23 1248   Care Cleansed with:;Antimicrobial agent;Sterile normal saline 08/11/23 1248   Dressing Changed 08/11/23 1248   Periwound Care Absorptive dressing applied 08/11/23 1248   Compression Two layer compression 08/11/23 1248   Off Loading Football dressing 08/11/23 1248   Dressing Change Due 08/18/23 08/11/23 1248             Plan:               Education about the prevention of limb loss.    Discussed wound healing cycle, skin integrity, ways to care for skin.Counseled patient on the effects of biomechanical pressure,  PVD, and blood glucose on healing. He verbalizes understanding that it can increase the chances of delayed healing and this prolonged exposure leads to infection or progression of infection which subsequently can result in loss of limb.    Adequate vitamin supplementation, protein intake, and hydration - discussed with patient    The wound is cleansed of foreign material as much as possible and the base inspected for bone or abscess.    Dorsal wound is well coapted without signs of hematoma or abscess.  Plantar wound with purposeful gapping and some serosanguineous drainage with milking and palpation but without signs of hematoma or abscess.      He will continue to be primarily nonweightbearing to the surgical limb with scooter assisted ambulation.    Return to clinic in 1 week.    Short-term goals include maintaining good offloading and minimizing bioburden, promoting granulation and epithelialization to healing.  Long-term goals include keeping the wound healed by good offloading and medical management under the direction of internist.    Shoe inspection. Diabetic Foot Education. Patient reminded of the importance of good nutrition and blood sugar control to help prevent podiatric complications of diabetes. Patient instructed on proper foot hygeine. We discussed wearing proper shoe gear, daily foot inspections, never walking without protective shoe gear, never putting sharp instruments to feet.        Procedures      Tissue pathology and/or culture taken     [] Yes      [x] No  Sharp debridement performed                   [] Yes       [x] No  Labs ordered     [] Yes       [x] No  Imaging ordered    [] Yes      [x] No    Orders Placed This Encounter   Procedures    Change dressing     Wound Dressing Orders     Dressing change  "frequency once a week   Remove old dressing   Cleanse or irrigate with: Irrigate with 30 mL Vashe  Protect periwound with: Gentian Violet hugging plantar wound bed    Primary dressing: WOUND BASE: Cellerate to suture lines. Adaptic (cut in half, 1 sheet to each wound bed) then Betadine Iodine soaked gauze (2 layers)   Secondary dressing: Mextra 5" x 9", Abram foam long x 1   Off-load/Compression: Open Toe Football dressing (Cast Padding x 2 rolls); Calamine Coflex. Patient to use knee scooter for LLE to off-load    Return to clinic in 1 week.          Follow up in about 1 week (around 8/18/2023) for wound care.       "

## 2023-08-14 ENCOUNTER — OFFICE VISIT (OUTPATIENT)
Dept: INFECTIOUS DISEASES | Facility: CLINIC | Age: 53
End: 2023-08-14
Payer: MEDICARE

## 2023-08-14 ENCOUNTER — INFUSION (OUTPATIENT)
Dept: INFECTIOUS DISEASES | Facility: HOSPITAL | Age: 53
End: 2023-08-14
Attending: INTERNAL MEDICINE
Payer: MEDICARE

## 2023-08-14 VITALS
WEIGHT: 225.06 LBS | HEART RATE: 125 BPM | BODY MASS INDEX: 29.83 KG/M2 | TEMPERATURE: 98 F | HEIGHT: 73 IN | SYSTOLIC BLOOD PRESSURE: 132 MMHG | DIASTOLIC BLOOD PRESSURE: 87 MMHG

## 2023-08-14 VITALS
DIASTOLIC BLOOD PRESSURE: 78 MMHG | RESPIRATION RATE: 20 BRPM | SYSTOLIC BLOOD PRESSURE: 148 MMHG | TEMPERATURE: 98 F | HEART RATE: 116 BPM | BODY MASS INDEX: 29.83 KG/M2 | WEIGHT: 225.06 LBS | HEIGHT: 73 IN | OXYGEN SATURATION: 99 %

## 2023-08-14 DIAGNOSIS — Z51.81 THERAPEUTIC DRUG MONITORING: ICD-10-CM

## 2023-08-14 DIAGNOSIS — E11.621 DIABETIC ULCER OF LEFT MIDFOOT ASSOCIATED WITH TYPE 2 DIABETES MELLITUS, WITH BONE INVOLVEMENT WITHOUT EVIDENCE OF NECROSIS: ICD-10-CM

## 2023-08-14 DIAGNOSIS — M86.10 ACUTE ON CHRONIC OSTEOMYELITIS: ICD-10-CM

## 2023-08-14 DIAGNOSIS — M86.60 ACUTE ON CHRONIC OSTEOMYELITIS: ICD-10-CM

## 2023-08-14 DIAGNOSIS — Z51.81 THERAPEUTIC DRUG MONITORING: Primary | ICD-10-CM

## 2023-08-14 DIAGNOSIS — E11.621 DIABETIC ULCER OF RIGHT MIDFOOT ASSOCIATED WITH TYPE 2 DIABETES MELLITUS, WITH NECROSIS OF BONE: Primary | ICD-10-CM

## 2023-08-14 DIAGNOSIS — M86.10 ACUTE ON CHRONIC OSTEOMYELITIS: Primary | ICD-10-CM

## 2023-08-14 DIAGNOSIS — L97.426 DIABETIC ULCER OF LEFT MIDFOOT ASSOCIATED WITH TYPE 2 DIABETES MELLITUS, WITH BONE INVOLVEMENT WITHOUT EVIDENCE OF NECROSIS: ICD-10-CM

## 2023-08-14 DIAGNOSIS — M86.60 ACUTE ON CHRONIC OSTEOMYELITIS: Primary | ICD-10-CM

## 2023-08-14 DIAGNOSIS — L97.414 DIABETIC ULCER OF RIGHT MIDFOOT ASSOCIATED WITH TYPE 2 DIABETES MELLITUS, WITH NECROSIS OF BONE: Primary | ICD-10-CM

## 2023-08-14 LAB
ALBUMIN SERPL BCP-MCNC: 3.1 G/DL (ref 3.5–5.2)
ALP SERPL-CCNC: 180 U/L (ref 55–135)
ALT SERPL W/O P-5'-P-CCNC: 16 U/L (ref 10–44)
ANION GAP SERPL CALC-SCNC: 11 MMOL/L (ref 8–16)
AST SERPL-CCNC: 16 U/L (ref 10–40)
BACTERIA SPEC AEROBE CULT: ABNORMAL
BACTERIA SPEC AEROBE CULT: ABNORMAL
BASOPHILS # BLD AUTO: 0.05 K/UL (ref 0–0.2)
BASOPHILS NFR BLD: 0.9 % (ref 0–1.9)
BILIRUB SERPL-MCNC: 0.6 MG/DL (ref 0.1–1)
BUN SERPL-MCNC: 14 MG/DL (ref 6–20)
CALCIUM SERPL-MCNC: 9 MG/DL (ref 8.7–10.5)
CHLORIDE SERPL-SCNC: 105 MMOL/L (ref 95–110)
CO2 SERPL-SCNC: 24 MMOL/L (ref 23–29)
CREAT SERPL-MCNC: 1 MG/DL (ref 0.5–1.4)
CRP SERPL-MCNC: 2.4 MG/L (ref 0–8.2)
DIFFERENTIAL METHOD: ABNORMAL
EOSINOPHIL # BLD AUTO: 0.3 K/UL (ref 0–0.5)
EOSINOPHIL NFR BLD: 6.1 % (ref 0–8)
ERYTHROCYTE [DISTWIDTH] IN BLOOD BY AUTOMATED COUNT: 14.8 % (ref 11.5–14.5)
ERYTHROCYTE [SEDIMENTATION RATE] IN BLOOD BY PHOTOMETRIC METHOD: 119 MM/HR (ref 0–23)
EST. GFR  (NO RACE VARIABLE): >60 ML/MIN/1.73 M^2
FUNGUS SPEC CULT: NORMAL
GLUCOSE SERPL-MCNC: 238 MG/DL (ref 70–110)
HCT VFR BLD AUTO: 37.3 % (ref 40–54)
HGB BLD-MCNC: 11.6 G/DL (ref 14–18)
IMM GRANULOCYTES # BLD AUTO: 0.02 K/UL (ref 0–0.04)
IMM GRANULOCYTES NFR BLD AUTO: 0.4 % (ref 0–0.5)
LYMPHOCYTES # BLD AUTO: 1.6 K/UL (ref 1–4.8)
LYMPHOCYTES NFR BLD: 28.7 % (ref 18–48)
MCH RBC QN AUTO: 28.1 PG (ref 27–31)
MCHC RBC AUTO-ENTMCNC: 31.1 G/DL (ref 32–36)
MCV RBC AUTO: 90 FL (ref 82–98)
MONOCYTES # BLD AUTO: 0.3 K/UL (ref 0.3–1)
MONOCYTES NFR BLD: 6 % (ref 4–15)
NEUTROPHILS # BLD AUTO: 3.2 K/UL (ref 1.8–7.7)
NEUTROPHILS NFR BLD: 57.9 % (ref 38–73)
NRBC BLD-RTO: 0 /100 WBC
PLATELET # BLD AUTO: 210 K/UL (ref 150–450)
PMV BLD AUTO: 11.8 FL (ref 9.2–12.9)
POTASSIUM SERPL-SCNC: 3.7 MMOL/L (ref 3.5–5.1)
PREALB SERPL-MCNC: 21 MG/DL (ref 20–43)
PROCALCITONIN SERPL IA-MCNC: 0.02 NG/ML
PROT SERPL-MCNC: 7.4 G/DL (ref 6–8.4)
RBC # BLD AUTO: 4.13 M/UL (ref 4.6–6.2)
SODIUM SERPL-SCNC: 140 MMOL/L (ref 136–145)
WBC # BLD AUTO: 5.54 K/UL (ref 3.9–12.7)

## 2023-08-14 PROCEDURE — 86140 C-REACTIVE PROTEIN: CPT | Performed by: PHYSICIAN ASSISTANT

## 2023-08-14 PROCEDURE — 99214 PR OFFICE/OUTPT VISIT, EST, LEVL IV, 30-39 MIN: ICD-10-PCS | Mod: S$PBB,,, | Performed by: PHYSICIAN ASSISTANT

## 2023-08-14 PROCEDURE — 84134 ASSAY OF PREALBUMIN: CPT | Performed by: PODIATRIST

## 2023-08-14 PROCEDURE — 99214 OFFICE O/P EST MOD 30 MIN: CPT | Mod: S$PBB,,, | Performed by: PHYSICIAN ASSISTANT

## 2023-08-14 PROCEDURE — 99213 OFFICE O/P EST LOW 20 MIN: CPT | Mod: PBBFAC | Performed by: PHYSICIAN ASSISTANT

## 2023-08-14 PROCEDURE — A4216 STERILE WATER/SALINE, 10 ML: HCPCS | Performed by: INTERNAL MEDICINE

## 2023-08-14 PROCEDURE — 85025 COMPLETE CBC W/AUTO DIFF WBC: CPT | Performed by: PHYSICIAN ASSISTANT

## 2023-08-14 PROCEDURE — 85652 RBC SED RATE AUTOMATED: CPT | Performed by: PODIATRIST

## 2023-08-14 PROCEDURE — 99999 PR PBB SHADOW E&M-EST. PATIENT-LVL III: CPT | Mod: PBBFAC,,, | Performed by: PHYSICIAN ASSISTANT

## 2023-08-14 PROCEDURE — 99999 PR PBB SHADOW E&M-EST. PATIENT-LVL III: ICD-10-PCS | Mod: PBBFAC,,, | Performed by: PHYSICIAN ASSISTANT

## 2023-08-14 PROCEDURE — 63600175 PHARM REV CODE 636 W HCPCS: Performed by: INTERNAL MEDICINE

## 2023-08-14 PROCEDURE — 25000003 PHARM REV CODE 250: Performed by: INTERNAL MEDICINE

## 2023-08-14 PROCEDURE — 80053 COMPREHEN METABOLIC PANEL: CPT | Performed by: PHYSICIAN ASSISTANT

## 2023-08-14 PROCEDURE — 84145 PROCALCITONIN (PCT): CPT | Performed by: PHYSICIAN ASSISTANT

## 2023-08-14 RX ORDER — HEPARIN 100 UNIT/ML
500 SYRINGE INTRAVENOUS
Status: DISCONTINUED | OUTPATIENT
Start: 2023-08-14 | End: 2023-08-14 | Stop reason: HOSPADM

## 2023-08-14 RX ORDER — SODIUM CHLORIDE 0.9 % (FLUSH) 0.9 %
10 SYRINGE (ML) INJECTION
Status: CANCELLED | OUTPATIENT
Start: 2023-08-14

## 2023-08-14 RX ORDER — HEPARIN 100 UNIT/ML
500 SYRINGE INTRAVENOUS
Status: CANCELLED | OUTPATIENT
Start: 2023-08-14

## 2023-08-14 RX ORDER — SODIUM CHLORIDE 0.9 % (FLUSH) 0.9 %
10 SYRINGE (ML) INJECTION
Status: DISCONTINUED | OUTPATIENT
Start: 2023-08-14 | End: 2023-08-14 | Stop reason: HOSPADM

## 2023-08-14 RX ADMIN — Medication 10 ML: at 02:08

## 2023-08-14 RX ADMIN — HEPARIN 500 UNITS: 100 SYRINGE at 02:08

## 2023-08-14 NOTE — PROGRESS NOTES
1344  Patient arrives for lab draw and dressing change to PICC line left upper medial arm - good blood flow - labs drawn without difficulty - patient dressing changed without signs of infection - small area of irritation approximately 1 cm below insertion site that appears to be tape sensitivity - sutures intact - no drainage - flushes well with sterile NS and heparin - patient has appointments for futre labs with dressing changes - left in no apparent distress

## 2023-08-15 NOTE — PROGRESS NOTES
Infectious Disease Clinic Note  08/14/2023       Subjective:       Patient ID: Indio Ryder Jr. is a 53 y.o. male being seen for a followup visit.    Chief Complaint: Follow-up    HPI     Mr. Ryder is a 51 y/o M with hx DM and numerous episodes of foot osteo presents for f/up of Lt foot osteomyelitis.    Interval hx:  Last seen on 3/2/23. Completed 6 wks cipro (3/12) and 4 wks augmentin (3/8) for pseudomonas (bone and wound cx) and e faecalis (wound cx). Reports increased drainage and size of wound despite continued adherence to treatment plan concerning for infection. Images under media tab reviewed. Wound cx 5/12 grew enterobacter cloacae and e faecalis.    Hx:   Pt has been following with podiatry for a non healing Lt foot ulcer that per pt, suddenly worsened. MRI  1/19 revealed: Extensive marrow edema involving the 4th TMT joint extending to the 2nd and 3rd TMT joints contiguous with the overlying soft tissue ulceration, concerning for osteomyelitis.  Underwent bone biopsy of left trochar on 1/20. Bone culture from 1/20 grew pseudomonas. Bone path revealed OM.  Discussed results with patient and started on cipro x 6 wks on 1/29. Subsequent wound culture from 1/27 growing pseudomonas plus e faecalis (R to tetracycline/ S to amp).       Has been undergoing hyperbaric therapy since 12/2022. Had normal ALISSA 11/18/22. Was treated x 4 wks w augmenting for e faecalis positive wound cx and 6 wks with cipro  (last day 3/12) for pseudomonas OM (bone and wound cx +). Underwent graft placement on 2/24 by Dr. Peterson. Had 2 weeks of cipro/amoxacillin 5/16 for wound infection.     7/17/23:  Referred by POD as increased wound drainge and MRI with stable bone changes at TMT area cf osteo.  Reportedly is to have surgy with Dr. Peterson in near future   Bone cultures 7/14 with CONS - on cipro per patient from POD.  Bone cx with CONS (though may be polymicrobial per [facility] micro lab- being worked up.  Stable.    8/14/23:  Bone  cultures at [facility] showed E faecalis, Kocuria Kristinae, and corynebacterium.  Pathology showed acute osteo.  PICC placed and was started on daptomycin.  On 7/31 went for debridement and had bone excised.  Cultures showed E faecalis and Staph lugdenensis. He remains on Daptomycin.  Pathology from 7/31 did not show acute osteo.  Reports doing ok without significant foot pain.  PICC doing ok and denies abx side effects.  Daptomycin started 7/25.  The patient denies any recent fever, chills, or sweats.       Family History   Adopted: Yes   Problem Relation Age of Onset    Hypertension Mother     Cancer Mother         breast    Diabetes Mother     Hypertension Father     Cataracts Father     Heart disease Father         mi    Diabetes Sister     No Known Problems Brother     Heart disease Sister         MI    No Known Problems Sister     Hypertension Maternal Aunt     No Known Problems Maternal Uncle     No Known Problems Paternal Aunt     No Known Problems Paternal Uncle     No Known Problems Maternal Grandmother     No Known Problems Maternal Grandfather     No Known Problems Paternal Grandmother     No Known Problems Paternal Grandfather     Amblyopia Neg Hx     Blindness Neg Hx     Glaucoma Neg Hx     Macular degeneration Neg Hx     Retinal detachment Neg Hx     Strabismus Neg Hx     Stroke Neg Hx     Thyroid disease Neg Hx      Social History     Socioeconomic History    Marital status: Single    Number of children: 0   Occupational History    Occupation: Retired     Employer: [employer]   Tobacco Use    Smoking status: Never    Smokeless tobacco: Never   Substance and Sexual Activity    Alcohol use: No    Drug use: No    Sexual activity: Yes     Partners: Female     Birth control/protection: Condom     Social Determinants of Health     Financial Resource Strain: High Risk (7/13/2023)    Overall Financial Resource Strain (CARDIA)     Difficulty of Paying Living Expenses: Very hard   Food Insecurity: No Food  Insecurity (7/13/2023)    Hunger Vital Sign     Worried About Running Out of Food in the Last Year: Never true     Ran Out of Food in the Last Year: Never true   Transportation Needs: No Transportation Needs (7/13/2023)    PRAPARE - Transportation     Lack of Transportation (Medical): No     Lack of Transportation (Non-Medical): No   Physical Activity: Inactive (7/13/2023)    Exercise Vital Sign     Days of Exercise per Week: 0 days     Minutes of Exercise per Session: 0 min   Stress: No Stress Concern Present (7/13/2023)    [employer] [employer] - Occupational Stress Questionnaire     Feeling of Stress : Not at all   Social Connections: Moderately Isolated (7/13/2023)    Social Connection and Isolation Panel [NHANES]     Frequency of Communication with Friends and Family: More than three times a week     Frequency of Social Gatherings with Friends and Family: More than three times a week     Attends Yazidi Services: More than 4 times per year     Active Member of Clubs or Organizations: No     Attends Club or Organization Meetings: Never     Marital Status:    Housing Stability: Low Risk  (7/13/2023)    Housing Stability Vital Sign     Unable to Pay for Housing in the Last Year: No     Number of Places Lived in the Last Year: 1     Unstable Housing in the Last Year: No     Past Surgical History:   Procedure Laterality Date    COLONOSCOPY Right 1/19/2022    Procedure: COLONOSCOPY;  Surgeon: Tj Haile MD;  Location: [facility] (4TH FLR);  Service: Endoscopy;  Laterality: Right;  previous order un-usable/poor prep 1/18/22  RAPID COVID test arrival 12:20  instructions handed to pt- sm    COLONOSCOPY N/A 3/21/2022    Procedure: COLONOSCOPY;  Surgeon: Sheng Haque MD;  Location: [facility] (2ND FLR);  Service: Endoscopy;  Laterality: N/A;  Colonoscopy with AES for hepatix flexure polyp removal, 2nd floor  Pt is fully vaccinated-DS  Pt prescribed Plavix by Dr. Stahl in Jan. 2022,  however pt states that he never started taking it-DS  3/16/22-Instructions sent via portal-DS    DEBRIDEMENT OF FOOT Left 7/14/2019    Procedure: DEBRIDEMENT, FOOT, with left 5th ray amputation;  Surgeon: Sis Hickman DPM;  Location: [facility] OR 2ND FLR;  Service: Podiatry;  Laterality: Left;    DEBRIDEMENT OF FOOT Left 7/17/2019    Procedure: DEBRIDEMENT, FOOT with leftt 5th ray partial amputation with Neox Graft;  Surgeon: Mai Burrell DPM;  Location: [facility] OR 2ND FLR;  Service: Podiatry;  Laterality: Left;  t    DEBRIDEMENT OF FOOT Bilateral 4/29/2021    Procedure: DEBRIDEMENT, FOOT;  Surgeon: Mai Burrell DPM;  Location: [facility] OR 1ST FLR;  Service: Podiatry;  Laterality: Bilateral;  Graft application    DEBRIDEMENT OF FOOT Left 7/31/2023    Procedure: DEBRIDEMENT, FOOT;  Surgeon: Marivel Peterson DPM;  Location: [facility] OR 2ND FLR;  Service: Podiatry;  Laterality: Left;    TOE AMPUTATION Right 06/05/2015    TOE AMPUTATION Right 01/19/2017    XI ROBOTIC COLECTOMY, RIGHT N/A 4/25/2022    Procedure: XI ROBOTIC COLECTOMY, RIGHT (lithotomy, eras low);  Surgeon: Erik Stout MD;  [facility];  Service: Colon and Rectal;  Laterality: N/A;  Paruch to [place]win    XI ROBOTIC COLECTOMY, RIGHT  4/25/2022    Procedure: ;  Surgeon: Erik Stout MD;  Location: [facility] OR 2ND FLR;  Service: Colon and Rectal;;       Patient's Medications   New Prescriptions    No medications on file   Previous Medications    ATORVASTATIN (LIPITOR) 80 MG TABLET    Take 1 tablet (80 mg total) by mouth once daily.    DAPTOMYCIN (CUBICIN) 500 MG INJECTION    Inject into the vein.    EMPAGLIFLOZIN (JARDIANCE) 10 MG TABLET    Take 1 tablet (10 mg total) by mouth once daily.    HYDROCODONE-ACETAMINOPHEN (NORCO) 5-325 MG PER TABLET    Take 1 tablet by mouth every 6 (six) hours as needed for Pain.    INSULIN ASPART U-100 (NOVOLOG) 100 UNIT/ML (3 ML) INPN PEN    Inject 8 units before meals plus scale 150-200+2,  "201-250+4, 251-300+6, 301-350+8, >350+10. Max daily 54 units.    INSULIN DETEMIR U-100 (LEVEMIR FLEXTOUCH) 100 UNIT/ML (3 ML) SUBQ INPN PEN    Inject 26 Units into the skin every evening.    ONETOUCH DELICA LANCETS 33 GAUGE [facility]        ONETOUCH ULTRA2 KIT        PEN NEEDLE, DIABETIC (BD SASCHA 2ND GEN PEN NEEDLE) 32 GAUGE X 5/32" NDLE    Use 4 times a day    SEMAGLUTIDE (OZEMPIC) 1 MG/DOSE (4 MG/3 ML)    Inject 1 mg into the skin every 7 days.   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, with long-term current use of insulin 03/23/2021    Chronic left shoulder pain 07/30/2020    Allergic reaction due to antibacterial drug 04/05/2020    Subacute osteomyelitis of left foot 03/11/2020    Hypertensive retinopathy, bilateral 01/28/2020    Diabetic ulcer of left midfoot associated with type 2 diabetes mellitus, with bone involvement without evidence of necrosis 08/02/2019    Severe malnutrition 07/22/2019    Diabetic ulcer of right midfoot associated with type 2 diabetes mellitus, with necrosis of bone 07/12/2019    Neck pain 11/08/2017    Hypokalemia 05/30/2017    Actinomyces infection 01/17/2017     Right foot      Type 2 diabetes mellitus with hyperglycemia, with long-term current use of insulin 05/03/2016    Mixed hyperlipidemia 08/12/2014    Essential hypertension 06/06/2013       Review of Systems   Review of Systems " "  Constitutional:  Negative for chills, fever and malaise/fatigue.   Respiratory:  Negative for cough and shortness of breath.    Cardiovascular:  Negative for chest pain and orthopnea.   Gastrointestinal:  Negative for abdominal pain, diarrhea and vomiting.   Genitourinary:  Negative for dysuria.   Musculoskeletal:  Negative for back pain and myalgias.        L foot wound   Neurological:  Negative for sensory change and weakness.           Objective:      /87 (BP Location: Right arm)   Pulse (!) 125   Temp 97.8 °F (36.6 °C) (Oral)   Ht 6' 1" (1.854 m)   Wt 102.1 kg (225 lb 1.4 oz)   BMI 29.70 kg/m²   Estimated body mass index is 29.7 kg/m² as calculated from the following:    Height as of this encounter: 6' 1" (1.854 m).    Weight as of this encounter: 102.1 kg (225 lb 1.4 oz).    Physical Exam  Vitals and nursing note reviewed.   Constitutional:       General: He is not in acute distress.     Appearance: Normal appearance. He is not ill-appearing, toxic-appearing or diaphoretic.       HENT:      Head: Normocephalic and atraumatic.      Nose: Nose normal. No congestion.      Mouth/Throat:      Mouth: Mucous membranes are moist.      Pharynx: Oropharynx is clear.   Eyes:      Conjunctiva/sclera: Conjunctivae normal.      Pupils: Pupils are equal, round, and reactive to light.   Cardiovascular:      Rate and Rhythm: Normal rate and regular rhythm.   Pulmonary:      Effort: Pulmonary effort is normal. No respiratory distress.      Breath sounds: Normal breath sounds.   Abdominal:      General: Abdomen is flat. There is no distension.      Palpations: Abdomen is soft.   Musculoskeletal:         General: No swelling or tenderness. Normal range of motion.      Cervical back: Normal range of motion and neck supple.      Comments: Clean dressings at Lt foot with orthotic shoe.    Skin:     General: Skin is warm and dry.   Neurological:      General: No focal deficit present.      Mental Status: He is alert and " oriented to person, place, and time.   Psychiatric:         Mood and Affect: Mood normal.         Behavior: Behavior normal.       Photo 8/11 below            Assessment:         1. Acute on chronic osteomyelitis        2. Diabetic ulcer of left midfoot associated with type 2 diabetes mellitus, with bone involvement without evidence of necrosis           5.54                RBC           4.13     Hemoglobin           11.6     Hematocrit           37.3     MCV           90     MCH           28.1     MCHC           31.1     RDW           14.8     Platelets           210     MPV           11.8     Gran %           57.9     Lymph %           28.7     Mono %           6.0     Eosinophil %           6.1     Basophil %           0.9     Immature Granulocytes           0.4     Gran # (ANC)           3.2     Lymph #           1.6     Mono #           0.3     Eos #           0.3     Baso #           0.05     Immature Grans (Abs)           0.02     nRBC           0     Differential Method           Autom...     OTHER HEMATOLOGY    Sed Rate           119     CHEM PROFILE    Sodium           140     Potassium           3.7     Chloride           105     CO2           24     Anion Gap           11     BUN           14     Creatinine           1.0     eGFR           >60.0     Glucose           238     Calcium           9.0     Alkaline Phosphatase           180     PROTEIN TOTAL           7.4     Albumin           3.1     Prealbumin           21     BILIRUBIN TOTAL           0.6     AST           16     ALT           16     CRP           2.4     THYROID & PARATHYROID    Procalcitonin           0.02         Plan:         Diabetic ulcer of left midfoot associated with type 2 diabetes mellitus, with fat layer exposed  53y/o M with hx of LLE DM foot ulcers c/b osteo s/p 6 wks abx (last day 3/12), now with increased drainage at nonealing ulcer concerning for infection. Wound cx growing pan-sen enterobacter cloacae and amp- S e  faecalis.  Treated 5/16 with 2 weeks of po cipro and amoxicillin. Now with increased wound drainage.  MRI L foot with stable changes ar 2,3, and 4 TMT area - likely chonic osteo. Bone culture with E faecalis, corynebacterium, kocuria k - on dapto.  Pathology with acute osteo.  OR 7/31 bone cultures with E faecalis and Staph lugdenensis - remains on dapto.  Path without acute osteo.    CRP and procal WNL on labs today.    Plan:   Continue Daptomycin to complete 6 weeks - EOC 9/5/23 - likely may continue po x 3 month out of cf chronic osteo  Weekly labs ordered  FU at EOC      Total professional time spent for the encounter: 30 minutes  Time was spent preparing to see the patient, reviewing results of prior testing, obtaining and/or reviewing separately obtained history, performing a medically appropriate examination and interview, counseling and educating the patient/family, ordering medications/tests/procedures, referring and communicating with other health care professionals, documenting clinical information in the electronic health record, and independently interpreting results.

## 2023-08-18 ENCOUNTER — HOSPITAL ENCOUNTER (OUTPATIENT)
Dept: WOUND CARE | Facility: HOSPITAL | Age: 53
Discharge: HOME | End: 2023-08-18
Attending: PODIATRIST
Payer: MEDICARE

## 2023-08-18 VITALS
DIASTOLIC BLOOD PRESSURE: 85 MMHG | BODY MASS INDEX: 29.82 KG/M2 | WEIGHT: 225 LBS | HEIGHT: 73 IN | TEMPERATURE: 98 F | SYSTOLIC BLOOD PRESSURE: 131 MMHG | OXYGEN SATURATION: 98 %

## 2023-08-18 DIAGNOSIS — E11.621 DIABETIC ULCER OF LEFT MIDFOOT ASSOCIATED WITH TYPE 2 DIABETES MELLITUS, WITH BONE INVOLVEMENT WITHOUT EVIDENCE OF NECROSIS: Primary | ICD-10-CM

## 2023-08-18 DIAGNOSIS — L97.426 DIABETIC ULCER OF LEFT MIDFOOT ASSOCIATED WITH TYPE 2 DIABETES MELLITUS, WITH BONE INVOLVEMENT WITHOUT EVIDENCE OF NECROSIS: Primary | ICD-10-CM

## 2023-08-18 DIAGNOSIS — M86.60 ACUTE ON CHRONIC OSTEOMYELITIS: ICD-10-CM

## 2023-08-18 DIAGNOSIS — M86.10 ACUTE ON CHRONIC OSTEOMYELITIS: ICD-10-CM

## 2023-08-18 PROCEDURE — 99499 NO LOS: ICD-10-PCS | Mod: ,,, | Performed by: PODIATRIST

## 2023-08-18 PROCEDURE — 11042 DBRDMT SUBQ TIS 1ST 20SQCM/<: CPT | Mod: ,,, | Performed by: PODIATRIST

## 2023-08-18 PROCEDURE — 11042 DBRDMT SUBQ TIS 1ST 20SQCM/<: CPT | Performed by: PODIATRIST

## 2023-08-18 PROCEDURE — 11042 WOUND DEBRIDEMENT: ICD-10-PCS | Mod: ,,, | Performed by: PODIATRIST

## 2023-08-18 PROCEDURE — 99499 UNLISTED E&M SERVICE: CPT | Mod: ,,, | Performed by: PODIATRIST

## 2023-08-18 NOTE — PROGRESS NOTES
[facility]                Progress Note    Subjective:       Patient ID: Indio Ryder Jr. is a 53 y.o. male.    Chief Complaint: Wound Check    Returns to wound clinic post Op follow up visit. Patient is approximately 3 weeks post surgical debridement with loose delayed primary closure. He walking to exam room using knee scooter for LLE without shoe or boot, and Right Foot in tennis shoe with lift. Dressing is intact with moderate amount of sanguineous and serosanguineous drainage, but no strike through noted. He has been continuing to stay out of town with family to avoid excessive ambulation. No c/o pain to wound beds at present. Denies fever, chills, nausea, vomiting or diarrhea. Continuing to receive IV ABX in [facility].     Wound Check      Review of Systems   Constitutional:  Negative for appetite change, chills, fatigue and fever.   HENT:  Negative for hearing loss.    Eyes:  Negative for photophobia and visual disturbance.   Respiratory:  Negative for cough, chest tightness, shortness of breath and wheezing.    Cardiovascular:  Positive for leg swelling. Negative for chest pain and palpitations.   Gastrointestinal:  Negative for constipation, diarrhea, nausea and vomiting.   Endocrine: Negative for cold intolerance and heat intolerance.   Genitourinary:  Negative for flank pain.   Musculoskeletal:  Positive for gait problem and myalgias. Negative for neck pain and neck stiffness.   Skin:  Positive for wound. Negative for color change.   Neurological:  Positive for numbness. Negative for weakness, light-headedness and headaches.        + paresthesia    Psychiatric/Behavioral:  Negative for sleep disturbance.          Objective:        Physical Exam  Constitutional:       Appearance: He is well-developed.   Cardiovascular:      Comments: Dorsalis pedis and posterior tibial pulses are palpable Skin is atrophic, slightly hyperpigmented, and mildly  edematous      Musculoskeletal:         General: No tenderness. Normal range of motion.      Comments: Muscle strength is 5/5 in all groups bilaterally.    S/p partial 5th ray amp b/l    S/p hallux amp right    Fat pad atrophy to heels and met heads bilateral       Skin:     General: Skin is warm and dry.      Coloration: Skin is not jaundiced, mottled or sallow.      Findings: Wound (see below) present. No abscess or ecchymosis.      Comments:        Neurological:      Mental Status: He is alert and oriented to person, place, and time.      Comments: [place]-Nenita 5.07 monofilamant testing is diminished Kenji feet. Sharp/dull sensation diminished Bilaterally. Light touch absent Bilaterally.       Psychiatric:         Behavior: Behavior normal.         Vitals:    08/18/23 1048   BP: 131/85   Temp: 98.4 °F (36.9 °C)       Assessment:           ICD-10-CM ICD-9-CM   1. Diabetic ulcer of left midfoot associated with type 2 diabetes mellitus, with bone involvement without evidence of necrosis  E11.621 250.80    L97.426 707.14   2. Acute on chronic osteomyelitis  M86.10 730.00    M86.60 730.10            (Retired) Wound 04/08/22 1137 Diabetic Ulcer Left plantar;lateral Foot (Active)   04/08/22 1137    Pre-existing: Yes   Primary Wound Type: Diabetic ulc   Side: Left   Orientation: plantar;lateral   Location: Foot   Wound Number:    Ankle-Brachial Index:    Pulses:    Removal Indication and Assessment:    Wound Outcome:    (Retired) Wound Type:    (Retired) Wound Length (cm):    (Retired) Wound Width (cm):    (Retired) Depth (cm):    Wound Description (Comments):    Removal Indications:    Wound Image    08/18/23 1507   Wound WDL ex 08/18/23 1507   Dressing Appearance Intact;Moist drainage 08/18/23 1507   Drainage Amount Small 08/18/23 1507   Drainage Characteristics/Odor Serosanguineous;No odor 08/18/23 1507   Appearance Red;Moist;Sutures intact 08/18/23 1507   Tissue loss description Full thickness 08/18/23 1507   Black  (%), Wound Tissue Color 0 % 08/18/23 1507   Red (%), Wound Tissue Color 100 % 08/18/23 1507   Yellow (%), Wound Tissue Color 0 % 08/18/23 1507   Periwound Area Pale white 08/18/23 1507   Wound Edges Defined 08/18/23 1507   Wound Length (cm) 4 cm 08/18/23 1507   Wound Width (cm) 0.4 cm 08/18/23 1507   Wound Depth (cm) 0.2 cm 08/18/23 1507   Wound Volume (cm^3) 0.32 cm^3 08/18/23 1507   Wound Surface Area (cm^2) 1.6 cm^2 08/18/23 1507   Tunneling (depth (cm)/location) 0 08/18/23 1507   Undermining (depth (cm)/location) 0 08/18/23 1507   Care Cleansed with:;Antimicrobial agent;Sterile normal saline;Wound cleanser;Sutures removed 08/18/23 1507   Dressing Changed 08/18/23 1507   Periwound Care Moisture barrier applied;Absorptive dressing applied 08/18/23 1507   Compression Two layer compression 08/18/23 1507   Off Loading Football dressing;Other (see comments) 08/18/23 1507   Dressing Change Due 08/25/23 08/18/23 1507            Incision/Site 07/31/23 1405 Left Foot (Active)   07/31/23 1405   Present Prior to Hospital Arrival?:    Side: Left   Location: Foot   Orientation:    Incision Type:    Closure Method:    Additional Comments:    Removal Indication and Assessment:    Wound Outcome:    Removal Indications:    Wound Image     08/18/23 1507   Incision WDL ex 08/18/23 1507   Dressing Appearance Intact;Moist drainage 08/18/23 1507   Drainage Amount Small 08/18/23 1507   Drainage Characteristics/Odor Serosanguineous;No odor 08/18/23 1507   Appearance Sutures intact 08/18/23 1507   Black (%), Wound Tissue Color 0 % 08/18/23 1507   Red (%), Wound Tissue Color 100 % 08/18/23 1507   Yellow (%), Wound Tissue Color 0 % 08/18/23 1507   Periwound Area Dry;Intact 08/18/23 1507   Wound Edges Defined 08/18/23 1507   Wound Length (cm) 1.3 cm 08/18/23 1507   Wound Width (cm) 0.2 cm 08/18/23 1507   Wound Depth (cm) 0.1 cm 08/18/23 1507   Wound Volume (cm^3) 0.026 cm^3 08/18/23 1507   Wound Surface Area (cm^2) 0.26 cm^2 08/18/23 1507    Tunneling (depth (cm)/location) 0 08/18/23 1507   Undermining (depth (cm)/location) 0 08/18/23 1507   Care Cleansed with:;Antimicrobial agent;Sterile normal saline;Wound cleanser 08/18/23 1507   Dressing Changed 08/18/23 1507   Periwound Care Absorptive dressing applied 08/18/23 1507   Off Loading Football dressing;Other (see comments) 08/18/23 1507   Dressing Change Due 08/25/23 08/18/23 1507             Plan:              Education about the prevention of limb loss.    Discussed wound healing cycle, skin integrity, ways to care for skin.Counseled patient on the effects of biomechanical pressure,  PVD, and blood glucose on healing. He verbalizes understanding that it can increase the chances of delayed healing and this prolonged exposure leads to infection or progression of infection which subsequently can result in loss of limb.    Adequate vitamin supplementation, protein intake, and hydration - discussed with patient    The wound is cleansed of foreign material as much as possible and the base inspected for bone or abscess.    All sutures removed    Dorsal wound is well coapted without signs of hematoma or abscess, dehiscence noted proximally.      Plantar wound with serosanguineous drainage and now smaller than preop    He will continue to be primarily nonweightbearing to the surgical limb with scooter assisted ambulation.    Return to clinic in 1 week.    Short-term goals include maintaining good offloading and minimizing bioburden, promoting granulation and epithelialization to healing.  Long-term goals include keeping the wound healed by good offloading and medical management under the direction of internist.    Shoe inspection. Diabetic Foot Education. Patient reminded of the importance of good nutrition and blood sugar control to help prevent podiatric complications of diabetes. Patient instructed on proper foot hygeine. We discussed wearing proper shoe gear, daily foot inspections, never walking without  protective shoe gear, never putting sharp instruments to feet.        Wound Debridement    Date/Time: 8/18/2023 10:29 AM    Performed by: Marivel Peterson DPM  Authorized by: Marivel Peterson DPM      Wound Details:    Location:  Left foot    Location:  Left Plantar    Type of Debridement:  Excisional       Length (cm):  4       Area (sq cm):  1.6       Width (cm):  0.4       Percent Debrided (%):  100       Depth (cm):  0.2       Total Area Debrided (sq cm):  1.6    Depth of debridement:  Subcutaneous tissue    Tissue debrided:  Dermis, Epidermis and Subcutaneous    Devitalized tissue debrided:  Callus    Instruments:  Curette, Forceps and Scissors  Bleeding:  Minimal  Hemostasis Achieved: Yes  Method Used:  Pressure  Patient tolerance:  Patient tolerated the procedure well with no immediate complications        Tissue pathology and/or culture taken     [] Yes      [x] No  Sharp debridement performed                   [] Yes       [x] No  Labs ordered     [] Yes       [x] No  Imaging ordered    [] Yes      [x] No    Orders Placed This Encounter   Procedures    Debridement     This order was created via procedure documentation     Standing Status:   Standing     Number of Occurrences:   1          Follow up in about 1 week (around 8/25/2023) for wound care.

## 2023-08-21 ENCOUNTER — INFUSION (OUTPATIENT)
Dept: INFECTIOUS DISEASES | Facility: HOSPITAL | Age: 53
End: 2023-08-21
Attending: INTERNAL MEDICINE
Payer: MEDICARE

## 2023-08-21 VITALS
BODY MASS INDEX: 29.7 KG/M2 | SYSTOLIC BLOOD PRESSURE: 119 MMHG | DIASTOLIC BLOOD PRESSURE: 73 MMHG | HEART RATE: 103 BPM | OXYGEN SATURATION: 97 % | WEIGHT: 225.06 LBS | RESPIRATION RATE: 16 BRPM | TEMPERATURE: 98 F

## 2023-08-21 DIAGNOSIS — L97.414 DIABETIC ULCER OF RIGHT MIDFOOT ASSOCIATED WITH TYPE 2 DIABETES MELLITUS, WITH NECROSIS OF BONE: ICD-10-CM

## 2023-08-21 DIAGNOSIS — E11.621 DIABETIC ULCER OF RIGHT MIDFOOT ASSOCIATED WITH TYPE 2 DIABETES MELLITUS, WITH NECROSIS OF BONE: ICD-10-CM

## 2023-08-21 DIAGNOSIS — Z51.81 THERAPEUTIC DRUG MONITORING: Primary | ICD-10-CM

## 2023-08-21 LAB
ALBUMIN SERPL BCP-MCNC: 3.1 G/DL (ref 3.5–5.2)
ALP SERPL-CCNC: 189 U/L (ref 55–135)
ALT SERPL W/O P-5'-P-CCNC: 27 U/L (ref 10–44)
ANION GAP SERPL CALC-SCNC: 12 MMOL/L (ref 8–16)
AST SERPL-CCNC: 18 U/L (ref 10–40)
BASOPHILS # BLD AUTO: 0.03 K/UL (ref 0–0.2)
BASOPHILS NFR BLD: 0.6 % (ref 0–1.9)
BILIRUB SERPL-MCNC: 0.5 MG/DL (ref 0.1–1)
BUN SERPL-MCNC: 11 MG/DL (ref 6–20)
CALCIUM SERPL-MCNC: 9.3 MG/DL (ref 8.7–10.5)
CHLORIDE SERPL-SCNC: 102 MMOL/L (ref 95–110)
CO2 SERPL-SCNC: 25 MMOL/L (ref 23–29)
CREAT SERPL-MCNC: 1.3 MG/DL (ref 0.5–1.4)
CRP SERPL-MCNC: 2.4 MG/L (ref 0–8.2)
DIFFERENTIAL METHOD: ABNORMAL
EOSINOPHIL # BLD AUTO: 0.2 K/UL (ref 0–0.5)
EOSINOPHIL NFR BLD: 5.1 % (ref 0–8)
ERYTHROCYTE [DISTWIDTH] IN BLOOD BY AUTOMATED COUNT: 14.9 % (ref 11.5–14.5)
EST. GFR  (NO RACE VARIABLE): >60 ML/MIN/1.73 M^2
GLUCOSE SERPL-MCNC: 293 MG/DL (ref 70–110)
HCT VFR BLD AUTO: 37.5 % (ref 40–54)
HGB BLD-MCNC: 11.8 G/DL (ref 14–18)
IMM GRANULOCYTES # BLD AUTO: 0 K/UL (ref 0–0.04)
IMM GRANULOCYTES NFR BLD AUTO: 0 % (ref 0–0.5)
LYMPHOCYTES # BLD AUTO: 1.6 K/UL (ref 1–4.8)
LYMPHOCYTES NFR BLD: 33.4 % (ref 18–48)
MCH RBC QN AUTO: 27.8 PG (ref 27–31)
MCHC RBC AUTO-ENTMCNC: 31.5 G/DL (ref 32–36)
MCV RBC AUTO: 88 FL (ref 82–98)
MONOCYTES # BLD AUTO: 0.3 K/UL (ref 0.3–1)
MONOCYTES NFR BLD: 6 % (ref 4–15)
NEUTROPHILS # BLD AUTO: 2.6 K/UL (ref 1.8–7.7)
NEUTROPHILS NFR BLD: 54.9 % (ref 38–73)
NRBC BLD-RTO: 0 /100 WBC
PLATELET # BLD AUTO: 193 K/UL (ref 150–450)
PMV BLD AUTO: 12.1 FL (ref 9.2–12.9)
POTASSIUM SERPL-SCNC: 3.7 MMOL/L (ref 3.5–5.1)
PROCALCITONIN SERPL IA-MCNC: <0.02 NG/ML
PROT SERPL-MCNC: 7.4 G/DL (ref 6–8.4)
RBC # BLD AUTO: 4.25 M/UL (ref 4.6–6.2)
SODIUM SERPL-SCNC: 139 MMOL/L (ref 136–145)
WBC # BLD AUTO: 4.7 K/UL (ref 3.9–12.7)

## 2023-08-21 PROCEDURE — 36415 COLL VENOUS BLD VENIPUNCTURE: CPT | Performed by: PHYSICIAN ASSISTANT

## 2023-08-21 PROCEDURE — 80053 COMPREHEN METABOLIC PANEL: CPT | Performed by: PHYSICIAN ASSISTANT

## 2023-08-21 PROCEDURE — 85025 COMPLETE CBC W/AUTO DIFF WBC: CPT | Performed by: PHYSICIAN ASSISTANT

## 2023-08-21 PROCEDURE — 63600175 PHARM REV CODE 636 W HCPCS: Performed by: INTERNAL MEDICINE

## 2023-08-21 PROCEDURE — 36592 COLLECT BLOOD FROM PICC: CPT

## 2023-08-21 PROCEDURE — 84145 PROCALCITONIN (PCT): CPT | Performed by: PHYSICIAN ASSISTANT

## 2023-08-21 PROCEDURE — 86140 C-REACTIVE PROTEIN: CPT | Performed by: PHYSICIAN ASSISTANT

## 2023-08-21 PROCEDURE — 96523 IRRIG DRUG DELIVERY DEVICE: CPT

## 2023-08-21 RX ORDER — HEPARIN 100 UNIT/ML
500 SYRINGE INTRAVENOUS
Status: CANCELLED | OUTPATIENT
Start: 2023-08-21

## 2023-08-21 RX ORDER — SODIUM CHLORIDE 0.9 % (FLUSH) 0.9 %
10 SYRINGE (ML) INJECTION
Status: CANCELLED | OUTPATIENT
Start: 2023-08-21

## 2023-08-21 RX ORDER — SODIUM CHLORIDE 0.9 % (FLUSH) 0.9 %
10 SYRINGE (ML) INJECTION
Status: DISCONTINUED | OUTPATIENT
Start: 2023-08-21 | End: 2023-08-21 | Stop reason: HOSPADM

## 2023-08-21 RX ORDER — HEPARIN 100 UNIT/ML
500 SYRINGE INTRAVENOUS
Status: DISCONTINUED | OUTPATIENT
Start: 2023-08-21 | End: 2023-08-21 | Stop reason: HOSPADM

## 2023-08-21 RX ADMIN — HEPARIN 500 UNITS: 100 SYRINGE at 01:08

## 2023-08-21 NOTE — PROGRESS NOTES
Patient arrives for lab draw and dressing change to PICC line left upper medial arm - flushed with NS 10 ncc with good blood return noted- labs drawn without difficulty speciomen sent to lab - patient dressing changed no redness, drainage or swelling to site noted, biopatch applied to site, sutures intact -flushed with NS and heparin -drsg dated with today's date.Tolerated well.

## 2023-08-25 ENCOUNTER — HOSPITAL ENCOUNTER (OUTPATIENT)
Dept: WOUND CARE | Facility: HOSPITAL | Age: 53
Discharge: HOME | End: 2023-08-25
Attending: PODIATRIST
Payer: MEDICARE

## 2023-08-25 VITALS — DIASTOLIC BLOOD PRESSURE: 79 MMHG | TEMPERATURE: 98 F | HEART RATE: 90 BPM | SYSTOLIC BLOOD PRESSURE: 158 MMHG

## 2023-08-25 DIAGNOSIS — L97.426 DIABETIC ULCER OF LEFT MIDFOOT ASSOCIATED WITH TYPE 2 DIABETES MELLITUS, WITH BONE INVOLVEMENT WITHOUT EVIDENCE OF NECROSIS: Primary | ICD-10-CM

## 2023-08-25 DIAGNOSIS — E11.621 DIABETIC ULCER OF LEFT MIDFOOT ASSOCIATED WITH TYPE 2 DIABETES MELLITUS, WITH BONE INVOLVEMENT WITHOUT EVIDENCE OF NECROSIS: Primary | ICD-10-CM

## 2023-08-25 PROCEDURE — 11042 DBRDMT SUBQ TIS 1ST 20SQCM/<: CPT | Performed by: PODIATRIST

## 2023-08-25 NOTE — PROGRESS NOTES
[facility]                Progress Note    Subjective:       Patient ID: Indio Ryder Jr. is a 53 y.o. male.    Chief Complaint: No chief complaint on file.    Returns to wound clinic post Op follow up visit. Patient is approximately 4 weeks post surgical debridement with loose delayed primary closure. He walking to exam room using knee scooter for LLE without shoe or boot, and Right Foot in tennis shoe with lift. Dressing is intact with moderate amount of sanguineous and serosanguineous drainage, but no strike through noted. He has been continuing to stay out of town with family to avoid excessive ambulation. No c/o pain to wound beds at present. Denies fever, chills, nausea, vomiting or diarrhea. Continuing to receive IV ABX in [facility].  He is anxious for wounds to be healed prior to the beginning of football season    Wound Check      Review of Systems   Constitutional:  Negative for appetite change, chills, fatigue and fever.   HENT:  Negative for hearing loss.    Eyes:  Negative for photophobia and visual disturbance.   Respiratory:  Negative for cough, chest tightness, shortness of breath and wheezing.    Cardiovascular:  Positive for leg swelling. Negative for chest pain and palpitations.   Gastrointestinal:  Negative for constipation, diarrhea, nausea and vomiting.   Endocrine: Negative for cold intolerance and heat intolerance.   Genitourinary:  Negative for flank pain.   Musculoskeletal:  Positive for gait problem and myalgias. Negative for neck pain and neck stiffness.   Skin:  Positive for wound. Negative for color change.   Neurological:  Positive for numbness. Negative for weakness, light-headedness and headaches.        + paresthesia    Psychiatric/Behavioral:  Negative for sleep disturbance.          Objective:        Physical Exam  Constitutional:       Appearance: He is well-developed.   Cardiovascular:      Comments: Dorsalis pedis and posterior  tibial pulses are palpable Skin is atrophic, slightly hyperpigmented, and mildly edematous      Musculoskeletal:         General: No tenderness. Normal range of motion.      Comments: Muscle strength is 5/5 in all groups bilaterally.    S/p partial 5th ray amp b/l    S/p hallux amp right    Fat pad atrophy to heels and met heads bilateral       Skin:     General: Skin is warm and dry.      Coloration: Skin is not jaundiced, mottled or sallow.      Findings: Wound (see below) present. No abscess or ecchymosis.      Comments:        Neurological:      Mental Status: He is alert and oriented to person, place, and time.      Comments: [place]-Nenita 5.07 monofilamant testing is diminished Kenji feet. Sharp/dull sensation diminished Bilaterally. Light touch absent Bilaterally.       Psychiatric:         Behavior: Behavior normal.         Vitals:    08/25/23 1024   BP: (!) 158/79   Pulse: 90   Temp: 98.3 °F (36.8 °C)       Assessment:           ICD-10-CM ICD-9-CM   1. Diabetic ulcer of left midfoot associated with type 2 diabetes mellitus, with bone involvement without evidence of necrosis  E11.621 250.80    L97.426 707.14            (Retired) Wound 04/08/22 1137 Diabetic Ulcer Left plantar;lateral Foot (Active)   04/08/22 1137    Pre-existing: Yes   Primary Wound Type: Diabetic ulc   Side: Left   Orientation: plantar;lateral   Location: Foot   Wound Number:    Ankle-Brachial Index:    Pulses:    Removal Indication and Assessment:    Wound Outcome:    (Retired) Wound Type:    (Retired) Wound Length (cm):    (Retired) Wound Width (cm):    (Retired) Depth (cm):    Wound Description (Comments):    Removal Indications:    Wound Image   08/25/23 1116   Wound WDL ex 08/25/23 1116   Dressing Appearance Intact;Dry;Clean 08/25/23 1116   Drainage Amount Moderate 08/25/23 1116   Drainage Characteristics/Odor Serosanguineous 08/25/23 1116   Appearance Pink;Red;White 08/25/23 1116   Black (%), Wound Tissue Color 0 % 08/25/23 1116    Red (%), Wound Tissue Color 100 % 08/25/23 1116   Yellow (%), Wound Tissue Color 0 % 08/25/23 1116   Wound Edges Open;Defined 08/25/23 1116   Wound Length (cm) 2.9 cm 08/25/23 1116   Wound Width (cm) 0.9 cm 08/25/23 1116   Wound Depth (cm) 0.6 cm 08/25/23 1116   Wound Volume (cm^3) 1.566 cm^3 08/25/23 1116   Wound Surface Area (cm^2) 2.61 cm^2 08/25/23 1116   Tunneling (depth (cm)/location) 0 08/25/23 1116   Undermining (depth (cm)/location) 0 08/25/23 1116   Care Sterile normal saline;Wound cleanser 08/25/23 1116   Dressing Applied;Other (comment) 08/25/23 1116   Compression Two layer compression 08/25/23 1116   Off Loading Football dressing 08/25/23 1116   Dressing Change Due 09/01/23 08/25/23 1116             Plan:              Education about the prevention of limb loss.    Discussed wound healing cycle, skin integrity, ways to care for skin.Counseled patient on the effects of biomechanical pressure,  PVD, and blood glucose on healing. He verbalizes understanding that it can increase the chances of delayed healing and this prolonged exposure leads to infection or progression of infection which subsequently can result in loss of limb.    Adequate vitamin supplementation, protein intake, and hydration - discussed with patient    The wound is cleansed of foreign material as much as possible and the base inspected for bone or abscess.  Utilizing 3-0 and 2-0 Prolene an attempt was made for delayed primary closure of both the dehiscence dorsally and the plantar wound.    He will continue to be primarily nonweightbearing to the surgical limb with scooter assisted ambulation.    Return to clinic in 1 week.    Short-term goals include maintaining good offloading and minimizing bioburden, promoting granulation and epithelialization to healing.  Long-term goals include keeping the wound healed by good offloading and medical management under the direction of internist.      Procedures      Tissue pathology and/or culture  "taken     [] Yes      [x] No  Sharp debridement performed                   [] Yes       [] No  Labs ordered     [] Yes       [x] No  Imaging ordered    [] Yes      [x] No    Orders Placed This Encounter   Procedures    Change dressing     Wound Dressing Orders     Dressing change frequency once a week   Remove old dressing   Cleanse or irrigate with: Irrigate with 30 mL Vashe   Protect periwound with: Gentian Violet hugging plantar wound bed    Primary dressing: WOUND BASE: Cellerate to suture lines. Adaptic (cut in half, 1 sheet to each wound bed) then Betadine Iodine soaked gauze (2 layers)   Secondary dressing: Mextra 5" x 9", Abram foam long x 1   Off-load/Compression: Open Toe Football dressing (Cast Padding x 2 rolls); Calamine Coflex. Patient to use knee scooter for LLE to off-load     Return to clinic in 1 week.          Follow up in about 1 week (around 9/1/2023) for wound care.       "

## 2023-08-28 ENCOUNTER — INFUSION (OUTPATIENT)
Dept: INFECTIOUS DISEASES | Facility: HOSPITAL | Age: 53
End: 2023-08-28
Attending: PHYSICIAN ASSISTANT
Payer: MEDICARE

## 2023-08-28 ENCOUNTER — OFFICE VISIT (OUTPATIENT)
Dept: INFECTIOUS DISEASES | Facility: CLINIC | Age: 53
End: 2023-08-28
Payer: MEDICARE

## 2023-08-28 VITALS
WEIGHT: 225 LBS | TEMPERATURE: 98 F | DIASTOLIC BLOOD PRESSURE: 87 MMHG | HEIGHT: 73 IN | SYSTOLIC BLOOD PRESSURE: 132 MMHG | BODY MASS INDEX: 29.82 KG/M2 | RESPIRATION RATE: 20 BRPM | HEART RATE: 94 BPM

## 2023-08-28 VITALS
WEIGHT: 225.06 LBS | DIASTOLIC BLOOD PRESSURE: 88 MMHG | BODY MASS INDEX: 29.83 KG/M2 | SYSTOLIC BLOOD PRESSURE: 143 MMHG | HEIGHT: 73 IN | HEART RATE: 94 BPM | TEMPERATURE: 98 F

## 2023-08-28 DIAGNOSIS — E11.621 DIABETIC ULCER OF RIGHT MIDFOOT ASSOCIATED WITH TYPE 2 DIABETES MELLITUS, WITH NECROSIS OF BONE: ICD-10-CM

## 2023-08-28 DIAGNOSIS — Z51.81 THERAPEUTIC DRUG MONITORING: Primary | ICD-10-CM

## 2023-08-28 DIAGNOSIS — L97.414 DIABETIC ULCER OF RIGHT MIDFOOT ASSOCIATED WITH TYPE 2 DIABETES MELLITUS, WITH NECROSIS OF BONE: ICD-10-CM

## 2023-08-28 LAB
ALBUMIN SERPL BCP-MCNC: 3.2 G/DL (ref 3.5–5.2)
ALP SERPL-CCNC: 211 U/L (ref 55–135)
ALT SERPL W/O P-5'-P-CCNC: 33 U/L (ref 10–44)
ANION GAP SERPL CALC-SCNC: 9 MMOL/L (ref 8–16)
AST SERPL-CCNC: 18 U/L (ref 10–40)
BASOPHILS # BLD AUTO: 0.03 K/UL (ref 0–0.2)
BASOPHILS NFR BLD: 0.6 % (ref 0–1.9)
BILIRUB SERPL-MCNC: 0.6 MG/DL (ref 0.1–1)
BUN SERPL-MCNC: 13 MG/DL (ref 6–20)
CALCIUM SERPL-MCNC: 9.2 MG/DL (ref 8.7–10.5)
CHLORIDE SERPL-SCNC: 104 MMOL/L (ref 95–110)
CK SERPL-CCNC: 96 U/L (ref 20–200)
CO2 SERPL-SCNC: 25 MMOL/L (ref 23–29)
CREAT SERPL-MCNC: 0.9 MG/DL (ref 0.5–1.4)
CRP SERPL-MCNC: 3.1 MG/L (ref 0–8.2)
DIFFERENTIAL METHOD: ABNORMAL
EOSINOPHIL # BLD AUTO: 0.2 K/UL (ref 0–0.5)
EOSINOPHIL NFR BLD: 3.5 % (ref 0–8)
ERYTHROCYTE [DISTWIDTH] IN BLOOD BY AUTOMATED COUNT: 15 % (ref 11.5–14.5)
EST. GFR  (NO RACE VARIABLE): >60 ML/MIN/1.73 M^2
FUNGUS SPEC CULT: NORMAL
GLUCOSE SERPL-MCNC: 176 MG/DL (ref 70–110)
HCT VFR BLD AUTO: 37.4 % (ref 40–54)
HGB BLD-MCNC: 11.5 G/DL (ref 14–18)
IMM GRANULOCYTES # BLD AUTO: 0 K/UL (ref 0–0.04)
IMM GRANULOCYTES NFR BLD AUTO: 0 % (ref 0–0.5)
LYMPHOCYTES # BLD AUTO: 1.8 K/UL (ref 1–4.8)
LYMPHOCYTES NFR BLD: 36.9 % (ref 18–48)
MCH RBC QN AUTO: 27.3 PG (ref 27–31)
MCHC RBC AUTO-ENTMCNC: 30.7 G/DL (ref 32–36)
MCV RBC AUTO: 89 FL (ref 82–98)
MONOCYTES # BLD AUTO: 0.4 K/UL (ref 0.3–1)
MONOCYTES NFR BLD: 8.8 % (ref 4–15)
NEUTROPHILS # BLD AUTO: 2.4 K/UL (ref 1.8–7.7)
NEUTROPHILS NFR BLD: 50.2 % (ref 38–73)
NRBC BLD-RTO: 0 /100 WBC
PLATELET # BLD AUTO: 212 K/UL (ref 150–450)
PMV BLD AUTO: 11.7 FL (ref 9.2–12.9)
POTASSIUM SERPL-SCNC: 3.5 MMOL/L (ref 3.5–5.1)
PROCALCITONIN SERPL IA-MCNC: 0.02 NG/ML
PROT SERPL-MCNC: 7.3 G/DL (ref 6–8.4)
RBC # BLD AUTO: 4.22 M/UL (ref 4.6–6.2)
SODIUM SERPL-SCNC: 138 MMOL/L (ref 136–145)
WBC # BLD AUTO: 4.8 K/UL (ref 3.9–12.7)

## 2023-08-28 PROCEDURE — 82550 ASSAY OF CK (CPK): CPT | Performed by: PHYSICIAN ASSISTANT

## 2023-08-28 PROCEDURE — 99214 PR OFFICE/OUTPT VISIT, EST, LEVL IV, 30-39 MIN: ICD-10-PCS | Mod: S$PBB,,, | Performed by: PHYSICIAN ASSISTANT

## 2023-08-28 PROCEDURE — 99999 PR PBB SHADOW E&M-EST. PATIENT-LVL III: CPT | Mod: PBBFAC,,, | Performed by: PHYSICIAN ASSISTANT

## 2023-08-28 PROCEDURE — 84145 PROCALCITONIN (PCT): CPT | Performed by: PHYSICIAN ASSISTANT

## 2023-08-28 PROCEDURE — 99213 OFFICE O/P EST LOW 20 MIN: CPT | Mod: PBBFAC | Performed by: PHYSICIAN ASSISTANT

## 2023-08-28 PROCEDURE — 99214 OFFICE O/P EST MOD 30 MIN: CPT | Mod: S$PBB,,, | Performed by: PHYSICIAN ASSISTANT

## 2023-08-28 PROCEDURE — 80053 COMPREHEN METABOLIC PANEL: CPT | Performed by: PHYSICIAN ASSISTANT

## 2023-08-28 PROCEDURE — 86140 C-REACTIVE PROTEIN: CPT | Performed by: PHYSICIAN ASSISTANT

## 2023-08-28 PROCEDURE — 99999 PR PBB SHADOW E&M-EST. PATIENT-LVL III: ICD-10-PCS | Mod: PBBFAC,,, | Performed by: PHYSICIAN ASSISTANT

## 2023-08-28 PROCEDURE — 63600175 PHARM REV CODE 636 W HCPCS: Performed by: INTERNAL MEDICINE

## 2023-08-28 PROCEDURE — 85025 COMPLETE CBC W/AUTO DIFF WBC: CPT | Performed by: PHYSICIAN ASSISTANT

## 2023-08-28 RX ORDER — HEPARIN 100 UNIT/ML
500 SYRINGE INTRAVENOUS
Status: DISCONTINUED | OUTPATIENT
Start: 2023-08-28 | End: 2023-08-28 | Stop reason: HOSPADM

## 2023-08-28 RX ORDER — SODIUM CHLORIDE 0.9 % (FLUSH) 0.9 %
10 SYRINGE (ML) INJECTION
Status: DISCONTINUED | OUTPATIENT
Start: 2023-08-28 | End: 2023-08-28 | Stop reason: HOSPADM

## 2023-08-28 RX ORDER — SODIUM CHLORIDE 0.9 % (FLUSH) 0.9 %
10 SYRINGE (ML) INJECTION
Status: CANCELLED | OUTPATIENT
Start: 2023-08-28

## 2023-08-28 RX ORDER — HEPARIN 100 UNIT/ML
500 SYRINGE INTRAVENOUS
Status: CANCELLED | OUTPATIENT
Start: 2023-08-28

## 2023-08-28 RX ADMIN — HEPARIN 500 UNITS: 100 SYRINGE at 02:08

## 2023-08-28 NOTE — PROGRESS NOTES
Infectious Disease Clinic Note  08/28/2023       Subjective:       Patient ID: Indio Ryder Jr. is a 53 y.o. male being seen for a followup visit.    Chief Complaint: Follow-up    HPI     Mr. Ryder is a 51 y/o M with hx DM and numerous episodes of foot osteo presents for f/up of Lt foot osteomyelitis.    Interval hx:  Last seen on 3/2/23. Completed 6 wks cipro (3/12) and 4 wks augmentin (3/8) for pseudomonas (bone and wound cx) and e faecalis (wound cx). Reports increased drainage and size of wound despite continued adherence to treatment plan concerning for infection. Images under media tab reviewed. Wound cx 5/12 grew enterobacter cloacae and e faecalis.    Hx:   Pt has been following with podiatry for a non healing Lt foot ulcer that per pt, suddenly worsened. MRI  1/19 revealed: Extensive marrow edema involving the 4th TMT joint extending to the 2nd and 3rd TMT joints contiguous with the overlying soft tissue ulceration, concerning for osteomyelitis.  Underwent bone biopsy of left trochar on 1/20. Bone culture from 1/20 grew pseudomonas. Bone path revealed OM.  Discussed results with patient and started on cipro x 6 wks on 1/29. Subsequent wound culture from 1/27 growing pseudomonas plus e faecalis (R to tetracycline/ S to amp).       Has been undergoing hyperbaric therapy since 12/2022. Had normal ALISSA 11/18/22. Was treated x 4 wks w augmenting for e faecalis positive wound cx and 6 wks with cipro  (last day 3/12) for pseudomonas OM (bone and wound cx +). Underwent graft placement on 2/24 by Dr. Peterson. Had 2 weeks of cipro/amoxacillin 5/16 for wound infection.     7/17/23:  Referred by POD as increased wound drainge and MRI with stable bone changes at TMT area cf osteo.  Reportedly is to have surgy with Dr. Peterson in near future   Bone cultures 7/14 with CONS - on cipro per patient from POD.  Bone cx with CONS (though may be polymicrobial per [facility] micro lab- being worked up.  Stable.    8/14/23:  Bone  cultures at [facility] showed E faecalis, Kocuria Kristinae, and corynebacterium.  Pathology showed acute osteo.  PICC placed and was started on daptomycin.  On 7/31 went for debridement and had bone excised.  Cultures showed E faecalis and Staph lugdenensis. He remains on Daptomycin.  Pathology from 7/31 did not show acute osteo.  Reports doing ok without significant foot pain.  PICC doing ok and denies abx side effects.  Daptomycin started 7/25.  The patient denies any recent fever, chills, or sweats.    8/28/23:  Doing well.  Foot healing.  Tolerating IV abx and denies PICC issues.  The patient denies any recent fever, chills, or sweats.         Family History   Adopted: Yes   Problem Relation Age of Onset    Hypertension Mother     Cancer Mother         breast    Diabetes Mother     Hypertension Father     Cataracts Father     Heart disease Father         mi    Diabetes Sister     No Known Problems Brother     Heart disease Sister         MI    No Known Problems Sister     Hypertension Maternal Aunt     No Known Problems Maternal Uncle     No Known Problems Paternal Aunt     No Known Problems Paternal Uncle     No Known Problems Maternal Grandmother     No Known Problems Maternal Grandfather     No Known Problems Paternal Grandmother     No Known Problems Paternal Grandfather     Amblyopia Neg Hx     Blindness Neg Hx     Glaucoma Neg Hx     Macular degeneration Neg Hx     Retinal detachment Neg Hx     Strabismus Neg Hx     Stroke Neg Hx     Thyroid disease Neg Hx      Social History     Socioeconomic History    Marital status: Single    Number of children: 0   Occupational History    Occupation: Retired     Employer: [employer]   Tobacco Use    Smoking status: Never    Smokeless tobacco: Never   Substance and Sexual Activity    Alcohol use: No    Drug use: No    Sexual activity: Yes     Partners: Female     Birth control/protection: Condom     Social Determinants of Health     Financial Resource Strain: High Risk  (7/13/2023)    Overall Financial Resource Strain (CARDIA)     Difficulty of Paying Living Expenses: Very hard   Food Insecurity: No Food Insecurity (7/13/2023)    Hunger Vital Sign     Worried About Running Out of Food in the Last Year: Never true     Ran Out of Food in the Last Year: Never true   Transportation Needs: No Transportation Needs (7/13/2023)    PRAPARE - Transportation     Lack of Transportation (Medical): No     Lack of Transportation (Non-Medical): No   Physical Activity: Inactive (7/13/2023)    Exercise Vital Sign     Days of Exercise per Week: 0 days     Minutes of Exercise per Session: 0 min   Stress: No Stress Concern Present (7/13/2023)    [employer] - Occupational Stress Questionnaire     Feeling of Stress : Not at all   Social Connections: Moderately Isolated (7/13/2023)    Social Connection and Isolation Panel [NHANES]     Frequency of Communication with Friends and Family: More than three times a week     Frequency of Social Gatherings with Friends and Family: More than three times a week     Attends Taoism Services: More than 4 times per year     Active Member of Clubs or Organizations: No     Attends Club or Organization Meetings: Never     Marital Status:    Housing Stability: Low Risk  (7/13/2023)    Housing Stability Vital Sign     Unable to Pay for Housing in the Last Year: No     Number of Places Lived in the Last Year: 1     Unstable Housing in the Last Year: No     Past Surgical History:   Procedure Laterality Date    COLONOSCOPY Right 1/19/2022    Procedure: COLONOSCOPY;  Surgeon: Tj Haile MD;  Location: [facility] (4TH FLR);  Service: Endoscopy;  Laterality: Right;  previous order un-usable/poor prep 1/18/22  RAPID COVID test arrival 12:20  instructions handed to pt- sm    COLONOSCOPY N/A 3/21/2022    Procedure: COLONOSCOPY;  Surgeon: Sheng Haque MD;  Location: [facility] (2ND FLR);  Service: Endoscopy;  Laterality: N/A;   Colonoscopy with AES for hepatix flexure polyp removal, 2nd floor  Pt is fully vaccinated-DS  Pt prescribed Plavix by Dr. Stahl in Jan. 2022, however pt states that he never started taking it-DS  3/16/22-Instructions sent via portal-DS    DEBRIDEMENT OF FOOT Left 7/14/2019    Procedure: DEBRIDEMENT, FOOT, with left 5th ray amputation;  Surgeon: Sis Hickman DPM;  Location: [facility] OR 2ND FLR;  Service: Podiatry;  Laterality: Left;    DEBRIDEMENT OF FOOT Left 7/17/2019    Procedure: DEBRIDEMENT, FOOT with leftt 5th ray partial amputation with Neox Graft;  Surgeon: Mai Burrell DPM;  Location: [facility] OR 2ND FLR;  Service: Podiatry;  Laterality: Left;  t    DEBRIDEMENT OF FOOT Bilateral 4/29/2021    Procedure: DEBRIDEMENT, FOOT;  Surgeon: Mai Burrell DPM;  Location: [facility] OR 1ST FLR;  Service: Podiatry;  Laterality: Bilateral;  Graft application    DEBRIDEMENT OF FOOT Left 7/31/2023    Procedure: DEBRIDEMENT, FOOT;  Surgeon: Marivel Peterson DPM;  Location: [facility] OR 2ND FLR;  Service: Podiatry;  Laterality: Left;    TOE AMPUTATION Right 06/05/2015    TOE AMPUTATION Right 01/19/2017    XI ROBOTIC COLECTOMY, RIGHT N/A 4/25/2022    Procedure: XI ROBOTIC COLECTOMY, RIGHT (lithotomy, eras low);  Surgeon: Erik Stout MD;  [facility];  Service: Colon and Rectal;  Laterality: N/A;  Paruch to Goodwin    XI ROBOTIC COLECTOMY, RIGHT  4/25/2022    Procedure: ;  Surgeon: Erik Stout MD;  Location: [facility] OR 2ND FLR;  Service: Colon and Rectal;;       Patient's Medications   New Prescriptions    No medications on file   Previous Medications    ATORVASTATIN (LIPITOR) 80 MG TABLET    Take 1 tablet (80 mg total) by mouth once daily.    DAPTOMYCIN (CUBICIN) 500 MG INJECTION    Inject into the vein.    EMPAGLIFLOZIN (JARDIANCE) 10 MG TABLET    Take 1 tablet (10 mg total) by mouth once daily.    HYDROCODONE-ACETAMINOPHEN (NORCO) 5-325 MG PER TABLET    Take 1 tablet by mouth every 6 (six) hours  "as needed for Pain.    INSULIN ASPART U-100 (NOVOLOG) 100 UNIT/ML (3 ML) INPN PEN    Inject 8 units before meals plus scale 150-200+2, 201-250+4, 251-300+6, 301-350+8, >350+10. Max daily 54 units.    INSULIN DETEMIR U-100 (LEVEMIR FLEXTOUCH) 100 UNIT/ML (3 ML) SUBQ INPN PEN    Inject 26 Units into the skin every evening.    ONETOUCH DELICA LANCETS 33 GAUGE MIS        ONETOUCH ULTRA2 KIT        PEN NEEDLE, DIABETIC (BD SASCHA 2ND GEN PEN NEEDLE) 32 GAUGE X 5/32" NDLE    Use 4 times a day    SEMAGLUTIDE (OZEMPIC) 1 MG/DOSE (4 MG/3 ML)    Inject 1 mg into the skin every 7 days.   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, with long-term current use of insulin 03/23/2021    Chronic left shoulder pain 07/30/2020    Allergic reaction due to antibacterial drug 04/05/2020    Subacute osteomyelitis of left foot 03/11/2020    Hypertensive retinopathy, bilateral 01/28/2020    Diabetic ulcer of left midfoot associated with type 2 diabetes mellitus, with bone involvement without evidence of necrosis 08/02/2019    Severe malnutrition 07/22/2019    Diabetic ulcer of right midfoot associated with type 2 diabetes mellitus, with necrosis of bone 07/12/2019    Neck pain 11/08/2017    Hypokalemia 05/30/2017    Actinomyces infection 01/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   Respiratory:  Negative for cough and shortness of breath.    Cardiovascular:  Negative for chest pain and orthopnea.   Gastrointestinal:  Negative for abdominal pain, diarrhea and vomiting.   Genitourinary:  Negative for dysuria.   Musculoskeletal:  Negative for back pain and myalgias.        L foot wound   Neurological:  Negative for sensory change and weakness.           Objective:      BP (!) 143/88 (BP Location: Right arm)   Pulse 94   Temp 97.8 °F (36.6 °C) (Oral)   Ht 6' 1" (1.854 m)   Wt 102.1 kg (225 lb 1.4 oz)   BMI 29.70 kg/m²   Estimated body mass index is 29.7 kg/m² as calculated from the following:    Height as of this encounter: 6' 1" (1.854 m).    Weight as of this encounter: 102.1 kg (225 lb 1.4 oz).    Physical Exam  Vitals and nursing note reviewed.   Constitutional:       General: He is not in acute distress.     Appearance: Normal appearance. He is not ill-appearing, toxic-appearing or diaphoretic.       HENT:      Head: Normocephalic and atraumatic.      Nose: Nose normal. No congestion.      Mouth/Throat:      Mouth: Mucous membranes are moist.      Pharynx: Oropharynx is clear.   Eyes:      Conjunctiva/sclera: Conjunctivae normal.      Pupils: Pupils are equal, round, and reactive to light.   Cardiovascular:      Rate and Rhythm: Normal rate and regular rhythm.   Pulmonary:      Effort: Pulmonary effort is normal. No respiratory distress.      Breath sounds: Normal breath sounds.   Abdominal:      General: Abdomen is flat. There is no distension.      Palpations: Abdomen is soft.   Musculoskeletal:         General: No swelling or tenderness. Normal range of motion.      Cervical back: Normal range of motion and neck supple.      Comments: Clean dressings at Lt foot with orthotic shoe.    Skin:    "  General: Skin is warm and dry.   Neurological:      General: No focal deficit present.      Mental Status: He is alert and oriented to person, place, and time.   Psychiatric:         Mood and Affect: Mood normal.         Behavior: Behavior normal.     8/25/23 below x 2                  Photo 8/11 below            Assessment:         1. Therapeutic drug monitoring  C-Reactive Protein    CBC Auto Differential    CK    Comprehensive Metabolic Panel          Plan:         Diabetic ulcer of left midfoot associated with type 2 diabetes mellitus, with fat layer exposed  51y/o M with hx of LLE DM foot ulcers c/b osteo s/p 6 wks abx (last day 3/12/23), now with increased drainage at nonealing ulcer concerning for infection. Wound cx growing pan-sen enterobacter cloacae and amp- S e faecalis.  Treated 5/16/23 with 2 weeks of po cipro and amoxicillin. Developed increased wound drainage.  MRI L foot with stable changes ar 2,3, and 4 TMT area - likely chonic osteo. Bone culture with E faecalis, corynebacterium, kocuria k - on dapto.  Pathology with acute osteo.  OR 7/31 bone cultures with E faecalis and Staph lugdenensis - remains on dapto. Gram stain showed budding yeast but no growth on cultures - ? Contaminant. Path without acute osteo.    CRP WNL on labs.  Tolerating IV abx and no PICC issues.  Pics indicate foot healing well.    Plan:   Continue Daptomycin to complete 6 weeks from last surgery - EOC 9/11/23   Weekly labs ordered  FU at EOC 9/11      Total professional time spent for the encounter: 30 minutes  Time was spent preparing to see the patient, reviewing results of prior testing, obtaining and/or reviewing separately obtained history, performing a medically appropriate examination and interview, counseling and educating the patient/family, ordering medications/tests/procedures, referring and communicating with other health care professionals, documenting clinical information in the electronic health record, and  independently interpreting results.

## 2023-08-28 NOTE — PROGRESS NOTES
Pt in clinic today for ROBBIN PICC dressing change. Site looks well with no redness or swelling;  Dressing changed per sterile tecnique and hospital policies;  pt tolerated well; Labs drawn per policy from purple lumen of PICC;  labs labeled aat bedside, verified by pt, and sent to lab via ;  pt d/c'd from clinic and is advised to follow up with MD  or their home health in one week for next dressing change;

## 2023-09-01 ENCOUNTER — HOSPITAL ENCOUNTER (OUTPATIENT)
Dept: WOUND CARE | Facility: HOSPITAL | Age: 53
Discharge: HOME | End: 2023-09-01
Attending: PODIATRIST
Payer: MEDICARE

## 2023-09-01 VITALS — TEMPERATURE: 98 F | DIASTOLIC BLOOD PRESSURE: 82 MMHG | HEART RATE: 94 BPM | SYSTOLIC BLOOD PRESSURE: 156 MMHG

## 2023-09-01 DIAGNOSIS — M86.60 ACUTE ON CHRONIC OSTEOMYELITIS: ICD-10-CM

## 2023-09-01 DIAGNOSIS — M86.10 ACUTE ON CHRONIC OSTEOMYELITIS: ICD-10-CM

## 2023-09-01 DIAGNOSIS — E11.621 DIABETIC ULCER OF LEFT MIDFOOT ASSOCIATED WITH TYPE 2 DIABETES MELLITUS, WITH BONE INVOLVEMENT WITHOUT EVIDENCE OF NECROSIS: Primary | ICD-10-CM

## 2023-09-01 DIAGNOSIS — L97.426 DIABETIC ULCER OF LEFT MIDFOOT ASSOCIATED WITH TYPE 2 DIABETES MELLITUS, WITH BONE INVOLVEMENT WITHOUT EVIDENCE OF NECROSIS: Primary | ICD-10-CM

## 2023-09-01 PROCEDURE — 11042 DBRDMT SUBQ TIS 1ST 20SQCM/<: CPT | Performed by: PODIATRIST

## 2023-09-01 PROCEDURE — 99213 OFFICE O/P EST LOW 20 MIN: CPT | Mod: ,,, | Performed by: PODIATRIST

## 2023-09-01 PROCEDURE — 99213 PR OFFICE/OUTPT VISIT, EST, LEVL III, 20-29 MIN: ICD-10-PCS | Mod: ,,, | Performed by: PODIATRIST

## 2023-09-01 NOTE — PROGRESS NOTES
[facility]                Progress Note    Subjective:       Patient ID: Indio Ryder Jr. is a 53 y.o. male.    Chief Complaint: Wound Check and Dressing Change    Post-Op visit week 4. Patient ambulated to exam room using knee scooter for LLE without shoe or boot, and Right Foot in tennis shoe with lift. Dressing is intact with moderate amount of serosanguineous drainage, but no strike through noted. No c/o pain to wound beds at present. Denies fever, chills, nausea, vomiting or diarrhea.   Continuing to receive IV ABX in [facility].     Wound Check      Review of Systems   Constitutional:  Negative for appetite change, chills, fatigue and fever.   HENT:  Negative for hearing loss.    Eyes:  Negative for photophobia and visual disturbance.   Respiratory:  Negative for cough, chest tightness, shortness of breath and wheezing.    Cardiovascular:  Positive for leg swelling. Negative for chest pain and palpitations.   Gastrointestinal:  Negative for constipation, diarrhea, nausea and vomiting.   Endocrine: Negative for cold intolerance and heat intolerance.   Genitourinary:  Negative for flank pain.   Musculoskeletal:  Positive for gait problem and myalgias. Negative for neck pain and neck stiffness.   Skin:  Positive for wound. Negative for color change.   Neurological:  Positive for numbness. Negative for weakness, light-headedness and headaches.        + paresthesia    Psychiatric/Behavioral:  Negative for sleep disturbance.          Objective:        Physical Exam  Constitutional:       Appearance: He is well-developed.   Cardiovascular:      Comments: Dorsalis pedis and posterior tibial pulses are palpable Skin is atrophic, slightly hyperpigmented, and mildly edematous      Musculoskeletal:         General: No tenderness. Normal range of motion.      Comments: Muscle strength is 5/5 in all groups bilaterally.    S/p partial 5th ray amp b/l    S/p hallux amp  right    Fat pad atrophy to heels and met heads bilateral       Skin:     General: Skin is warm and dry.      Coloration: Skin is not jaundiced, mottled or sallow.      Findings: Wound (see below) present. No abscess or ecchymosis.      Comments:        Neurological:      Mental Status: He is alert and oriented to person, place, and time.      Comments: [place] 5.07 monofilamant testing is diminished Kenji feet. Sharp/dull sensation diminished Bilaterally. Light touch absent Bilaterally.       Psychiatric:         Behavior: Behavior normal.         Vitals:    09/01/23 1016   BP: (!) 156/82   Pulse: 94   Temp: 98 °F (36.7 °C)       Assessment:           ICD-10-CM ICD-9-CM   1. Diabetic ulcer of left midfoot associated with type 2 diabetes mellitus, with bone involvement without evidence of necrosis  E11.621 250.80    L97.426 707.14   2. Acute on chronic osteomyelitis  M86.10 730.00    M86.60 730.10            (Retired) Wound 04/08/22 1137 Diabetic Ulcer Left plantar;lateral Foot (Active)   04/08/22 1137    Pre-existing: Yes   Primary Wound Type: Diabetic ulc   Side: Left   Orientation: plantar;lateral   Location: Foot   Wound Number:    Ankle-Brachial Index:    Pulses:    Removal Indication and Assessment:    Wound Outcome:    (Retired) Wound Type:    (Retired) Wound Length (cm):    (Retired) Wound Width (cm):    (Retired) Depth (cm):    Wound Description (Comments):    Removal Indications:    Wound Image   09/01/23 1632   Wound WDL ex 09/01/23 1632   Dressing Appearance Intact;Moist drainage 09/01/23 1632   Drainage Amount Moderate 09/01/23 1632   Drainage Characteristics/Odor Serosanguineous;No odor 09/01/23 1632   Appearance Sutures intact;Red;Moist 09/01/23 1632   Tissue loss description Full thickness 09/01/23 1632   Black (%), Wound Tissue Color 0 % 09/01/23 1632   Red (%), Wound Tissue Color 100 % 09/01/23 1632   Yellow (%), Wound Tissue Color 0 % 09/01/23 1632   Periwound Area [place] 09/01/23 1632    Wound Edges Defined;Open 09/01/23 1632   Wound Length (cm) 3.6 cm 09/01/23 1632   Wound Width (cm) 0.5 cm 09/01/23 1632   Wound Depth (cm) 0.2 cm 09/01/23 1632   Wound Volume (cm^3) 0.36 cm^3 09/01/23 1632   Wound Surface Area (cm^2) 1.8 cm^2 09/01/23 1632   Tunneling (depth (cm)/location) 0 09/01/23 1632   Undermining (depth (cm)/location) 0 09/01/23 1632   Care Cleansed with:;Antimicrobial agent;Sterile normal saline;Wound cleanser 09/01/23 1632   Dressing Changed 09/01/23 1632   Periwound Care Moisture barrier applied;Absorptive dressing applied 09/01/23 1632   Compression Two layer compression 09/01/23 1632   Off Loading Football dressing 09/01/23 1632   Dressing Change Due 09/08/23 09/01/23 1632            Incision/Site 07/31/23 1405 Left Foot (Active)   07/31/23 1405   Present Prior to Hospital Arrival?:    Side: Left   Location: Foot   Orientation:    Incision Type:    Closure Method:    Additional Comments:    Removal Indication and Assessment:    Wound Outcome:    Removal Indications:    Wound Image   09/01/23 1632   Incision WDL ex 09/01/23 1632   Dressing Appearance Intact;Moist drainage 09/01/23 1632   Drainage Amount Small 09/01/23 1632   Drainage Characteristics/Odor Serosanguineous 09/01/23 1632   Appearance Sutures intact 09/01/23 1632   Periwound Area Dry 09/01/23 1632   Wound Edges Approximated 09/01/23 1632   Wound Length (cm) 1.3 cm 09/01/23 1632   Wound Width (cm) 0 cm 09/01/23 1632   Wound Depth (cm) 0 cm 09/01/23 1632   Wound Volume (cm^3) 0 cm^3 09/01/23 1632   Wound Surface Area (cm^2) 0 cm^2 09/01/23 1632   Tunneling (depth (cm)/location) 0 09/01/23 1632   Undermining (depth (cm)/location) 0 09/01/23 1632   Care Cleansed with:;Antimicrobial agent;Sterile normal saline;Wound cleanser 09/01/23 1632   Dressing Changed 09/01/23 1632   Periwound Care Absorptive dressing applied 09/01/23 1632   Compression Two layer compression 09/01/23 1632   Off Loading Football dressing 09/01/23 1632  "  Dressing Change Due 09/08/23 09/01/23 7788             Plan:              Education about the prevention of limb loss.    Discussed wound healing cycle, skin integrity, ways to care for skin.Counseled patient on the effects of biomechanical pressure,  PVD, and blood glucose on healing. He verbalizes understanding that it can increase the chances of delayed healing and this prolonged exposure leads to infection or progression of infection which subsequently can result in loss of limb.    Adequate vitamin supplementation, protein intake, and hydration - discussed with patient    The wound is cleansed of foreign material as much as possible and the base inspected for bone or abscess.      Sutures appear intact to both Dorsal and Plantar aspect of Left Foot. Dressing personally applied     He will continue to be primarily nonweightbearing to the surgical limb with scooter assisted ambulation.    Return to clinic in 1 week.    Short-term goals include maintaining good offloading and minimizing bioburden, promoting granulation and epithelialization to healing.  Long-term goals include keeping the wound healed by good offloading and medical management under the direction of internist.      Procedures      Tissue pathology and/or culture taken     [] Yes      [x] No  Sharp debridement performed                   [] Yes       [x] No  Labs ordered     [] Yes       [x] No  Imaging ordered    [] Yes      [x] No    Orders Placed This Encounter   Procedures    Change dressing     Wound Dressing Orders     Dressing change frequency once a week   Remove old dressing   Cleanse or irrigate with: Irrigate with 15 mL Vashe (5 mL to Dorsal and 10 mL to Plantar)  Protect periwound with: Gentian Violet hugging plantar wound bed    Primary dressing: WOUND BASE: Cellerate to suture lines then Adaptic   Secondary dressing: Drawtex (to plantar, cut to size) then Mextra 5" x 9", Abram foam long x 2   Off-load/Compression: Open Toe Football " dressing (Cast Padding x 2 rolls); Calamine Coflex. Knee scooter for LLE to off-load     Return to clinic in 1 week.          Follow up in about 1 week (around 9/8/2023) for wound care.

## 2023-09-02 LAB
ACID FAST MOD KINY STN SPEC: NORMAL
MYCOBACTERIUM SPEC QL CULT: NORMAL

## 2023-09-05 ENCOUNTER — INFUSION (OUTPATIENT)
Dept: INFECTIOUS DISEASES | Facility: HOSPITAL | Age: 53
End: 2023-09-05
Payer: MEDICARE

## 2023-09-05 VITALS
BODY MASS INDEX: 29.1 KG/M2 | SYSTOLIC BLOOD PRESSURE: 126 MMHG | HEIGHT: 73 IN | OXYGEN SATURATION: 96 % | TEMPERATURE: 98 F | WEIGHT: 219.56 LBS | HEART RATE: 92 BPM | RESPIRATION RATE: 20 BRPM | DIASTOLIC BLOOD PRESSURE: 78 MMHG

## 2023-09-05 DIAGNOSIS — L97.414 DIABETIC ULCER OF RIGHT MIDFOOT ASSOCIATED WITH TYPE 2 DIABETES MELLITUS, WITH NECROSIS OF BONE: ICD-10-CM

## 2023-09-05 DIAGNOSIS — Z51.81 THERAPEUTIC DRUG MONITORING: Primary | ICD-10-CM

## 2023-09-05 DIAGNOSIS — E11.621 DIABETIC ULCER OF RIGHT MIDFOOT ASSOCIATED WITH TYPE 2 DIABETES MELLITUS, WITH NECROSIS OF BONE: ICD-10-CM

## 2023-09-05 LAB
ALBUMIN SERPL BCP-MCNC: 3.5 G/DL (ref 3.5–5.2)
ALP SERPL-CCNC: 208 U/L (ref 55–135)
ALT SERPL W/O P-5'-P-CCNC: 24 U/L (ref 10–44)
ANION GAP SERPL CALC-SCNC: 13 MMOL/L (ref 8–16)
AST SERPL-CCNC: 22 U/L (ref 10–40)
BASOPHILS # BLD AUTO: 0.03 K/UL (ref 0–0.2)
BASOPHILS NFR BLD: 0.6 % (ref 0–1.9)
BILIRUB SERPL-MCNC: 0.6 MG/DL (ref 0.1–1)
BUN SERPL-MCNC: 12 MG/DL (ref 6–20)
CALCIUM SERPL-MCNC: 9.6 MG/DL (ref 8.7–10.5)
CHLORIDE SERPL-SCNC: 103 MMOL/L (ref 95–110)
CO2 SERPL-SCNC: 24 MMOL/L (ref 23–29)
CREAT SERPL-MCNC: 1 MG/DL (ref 0.5–1.4)
CRP SERPL-MCNC: 1.4 MG/L (ref 0–8.2)
DIFFERENTIAL METHOD: ABNORMAL
EOSINOPHIL # BLD AUTO: 0.2 K/UL (ref 0–0.5)
EOSINOPHIL NFR BLD: 3.8 % (ref 0–8)
ERYTHROCYTE [DISTWIDTH] IN BLOOD BY AUTOMATED COUNT: 14.8 % (ref 11.5–14.5)
EST. GFR  (NO RACE VARIABLE): >60 ML/MIN/1.73 M^2
GLUCOSE SERPL-MCNC: 148 MG/DL (ref 70–110)
HCT VFR BLD AUTO: 40.9 % (ref 40–54)
HGB BLD-MCNC: 12.5 G/DL (ref 14–18)
IMM GRANULOCYTES # BLD AUTO: 0.01 K/UL (ref 0–0.04)
IMM GRANULOCYTES NFR BLD AUTO: 0.2 % (ref 0–0.5)
LYMPHOCYTES # BLD AUTO: 2 K/UL (ref 1–4.8)
LYMPHOCYTES NFR BLD: 39.2 % (ref 18–48)
MCH RBC QN AUTO: 27.3 PG (ref 27–31)
MCHC RBC AUTO-ENTMCNC: 30.6 G/DL (ref 32–36)
MCV RBC AUTO: 89 FL (ref 82–98)
MONOCYTES # BLD AUTO: 0.3 K/UL (ref 0.3–1)
MONOCYTES NFR BLD: 6.1 % (ref 4–15)
NEUTROPHILS # BLD AUTO: 2.5 K/UL (ref 1.8–7.7)
NEUTROPHILS NFR BLD: 50.1 % (ref 38–73)
NRBC BLD-RTO: 0 /100 WBC
PLATELET # BLD AUTO: 259 K/UL (ref 150–450)
PMV BLD AUTO: 11.4 FL (ref 9.2–12.9)
POTASSIUM SERPL-SCNC: 3.6 MMOL/L (ref 3.5–5.1)
PROCALCITONIN SERPL IA-MCNC: 0.02 NG/ML
PROT SERPL-MCNC: 7.7 G/DL (ref 6–8.4)
RBC # BLD AUTO: 4.58 M/UL (ref 4.6–6.2)
SODIUM SERPL-SCNC: 140 MMOL/L (ref 136–145)
WBC # BLD AUTO: 5.05 K/UL (ref 3.9–12.7)

## 2023-09-05 PROCEDURE — 36415 COLL VENOUS BLD VENIPUNCTURE: CPT | Performed by: PHYSICIAN ASSISTANT

## 2023-09-05 PROCEDURE — 36592 COLLECT BLOOD FROM PICC: CPT

## 2023-09-05 PROCEDURE — 84145 PROCALCITONIN (PCT): CPT | Performed by: PHYSICIAN ASSISTANT

## 2023-09-05 PROCEDURE — 86140 C-REACTIVE PROTEIN: CPT | Performed by: PHYSICIAN ASSISTANT

## 2023-09-05 PROCEDURE — 63600175 PHARM REV CODE 636 W HCPCS: Performed by: INTERNAL MEDICINE

## 2023-09-05 PROCEDURE — 80053 COMPREHEN METABOLIC PANEL: CPT | Performed by: PHYSICIAN ASSISTANT

## 2023-09-05 PROCEDURE — 85025 COMPLETE CBC W/AUTO DIFF WBC: CPT | Performed by: PHYSICIAN ASSISTANT

## 2023-09-05 RX ORDER — HEPARIN 100 UNIT/ML
500 SYRINGE INTRAVENOUS
OUTPATIENT
Start: 2023-09-05

## 2023-09-05 RX ORDER — SODIUM CHLORIDE 0.9 % (FLUSH) 0.9 %
10 SYRINGE (ML) INJECTION
Status: DISCONTINUED | OUTPATIENT
Start: 2023-09-05 | End: 2023-09-05 | Stop reason: HOSPADM

## 2023-09-05 RX ORDER — SODIUM CHLORIDE 0.9 % (FLUSH) 0.9 %
10 SYRINGE (ML) INJECTION
OUTPATIENT
Start: 2023-09-05

## 2023-09-05 RX ORDER — HEPARIN 100 UNIT/ML
500 SYRINGE INTRAVENOUS
Status: DISCONTINUED | OUTPATIENT
Start: 2023-09-05 | End: 2023-09-05 | Stop reason: HOSPADM

## 2023-09-05 RX ADMIN — HEPARIN 500 UNITS: 100 SYRINGE at 02:09

## 2023-09-06 DIAGNOSIS — E11.9 TYPE 2 DIABETES MELLITUS WITHOUT COMPLICATION: ICD-10-CM

## 2023-09-07 ENCOUNTER — TELEPHONE (OUTPATIENT)
Dept: ENDOSCOPY | Facility: HOSPITAL | Age: 53
End: 2023-09-07
Payer: MEDICARE

## 2023-09-07 NOTE — TELEPHONE ENCOUNTER
Spoke to patient confirmed arrival for colonoscopy. Pt reports he took last ozempic dose on Tuesday 9/5 and instructed to hold 9/12 dose for procedure. Pt verbalized understanding.

## 2023-09-08 ENCOUNTER — HOSPITAL ENCOUNTER (OUTPATIENT)
Dept: WOUND CARE | Facility: HOSPITAL | Age: 53
Discharge: HOME | End: 2023-09-08
Attending: PODIATRIST
Payer: MEDICARE

## 2023-09-08 VITALS — HEART RATE: 91 BPM | DIASTOLIC BLOOD PRESSURE: 85 MMHG | SYSTOLIC BLOOD PRESSURE: 138 MMHG | TEMPERATURE: 99 F

## 2023-09-08 DIAGNOSIS — M86.10 ACUTE ON CHRONIC OSTEOMYELITIS: ICD-10-CM

## 2023-09-08 DIAGNOSIS — L97.426 DIABETIC ULCER OF LEFT MIDFOOT ASSOCIATED WITH TYPE 2 DIABETES MELLITUS, WITH BONE INVOLVEMENT WITHOUT EVIDENCE OF NECROSIS: Primary | ICD-10-CM

## 2023-09-08 DIAGNOSIS — E11.621 DIABETIC ULCER OF LEFT MIDFOOT ASSOCIATED WITH TYPE 2 DIABETES MELLITUS, WITH BONE INVOLVEMENT WITHOUT EVIDENCE OF NECROSIS: Primary | ICD-10-CM

## 2023-09-08 DIAGNOSIS — M86.60 ACUTE ON CHRONIC OSTEOMYELITIS: ICD-10-CM

## 2023-09-08 PROCEDURE — 99213 PR OFFICE/OUTPT VISIT, EST, LEVL III, 20-29 MIN: ICD-10-PCS | Mod: ,,, | Performed by: PODIATRIST

## 2023-09-08 PROCEDURE — 99213 OFFICE O/P EST LOW 20 MIN: CPT | Mod: ,,, | Performed by: PODIATRIST

## 2023-09-08 PROCEDURE — 11042 DBRDMT SUBQ TIS 1ST 20SQCM/<: CPT | Performed by: PODIATRIST

## 2023-09-08 NOTE — PROGRESS NOTES
[facility]                Progress Note    Subjective:       Patient ID: Indio Ryder Jr. is a 53 y.o. male.    Chief Complaint: Post-op Evaluation    Post-Op visit week 5. Patient ambulated to exam room using knee scooter for LLE with Darco shoe on foot, and tennis shoe with lift on Right Foot. Dressing is intact without strike through drainage. No c/o pain to wound beds at present. Denies fever, chills, nausea, vomiting or diarrhea. Continuing to receive IV ABX in [facility], states it will be removed on Monday.     Wound Check      Review of Systems   Constitutional:  Negative for appetite change, chills, fatigue and fever.   HENT:  Negative for hearing loss.    Eyes:  Negative for photophobia and visual disturbance.   Respiratory:  Negative for cough, chest tightness, shortness of breath and wheezing.    Cardiovascular:  Positive for leg swelling. Negative for chest pain and palpitations.   Gastrointestinal:  Negative for constipation, diarrhea, nausea and vomiting.   Endocrine: Negative for cold intolerance and heat intolerance.   Genitourinary:  Negative for flank pain.   Musculoskeletal:  Positive for gait problem and myalgias. Negative for neck pain and neck stiffness.   Skin:  Positive for wound. Negative for color change.   Neurological:  Positive for numbness. Negative for weakness, light-headedness and headaches.        + paresthesia    Psychiatric/Behavioral:  Negative for sleep disturbance.          Objective:        Physical Exam  Constitutional:       Appearance: He is well-developed.   Cardiovascular:      Comments: Dorsalis pedis and posterior tibial pulses are palpable Skin is atrophic, slightly hyperpigmented, and mildly edematous      Musculoskeletal:         General: No tenderness. Normal range of motion.      Comments: Muscle strength is 5/5 in all groups bilaterally.    S/p partial 5th ray amp b/l    S/p hallux amp right    Fat pad atrophy to  heels and met heads bilateral       Skin:     General: Skin is warm and dry.      Coloration: Skin is not jaundiced, mottled or sallow.      Findings: Wound (see below) present. No abscess or ecchymosis.      Comments:        Neurological:      Mental Status: He is alert and oriented to person, place, and time.      Comments: Louisa-Nenita 5.07 monofilamant testing is diminished Kenji feet. Sharp/dull sensation diminished Bilaterally. Light touch absent Bilaterally.       Psychiatric:         Behavior: Behavior normal.         Vitals:    09/08/23 1038   BP: 138/85   Pulse: 91   Temp: 98.6 °F (37 °C)       Assessment:           ICD-10-CM ICD-9-CM   1. Diabetic ulcer of left midfoot associated with type 2 diabetes mellitus, with bone involvement without evidence of necrosis  E11.621 250.80    L97.426 707.14   2. Acute on chronic osteomyelitis  M86.10 730.00    M86.60 730.10            (Retired) Wound 04/08/22 1137 Diabetic Ulcer Left plantar;lateral Foot (Active)   04/08/22 1137    Pre-existing: Yes   Primary Wound Type: Diabetic ulc   Side: Left   Orientation: plantar;lateral   Location: Foot   Wound Number:    Ankle-Brachial Index:    Pulses:    Removal Indication and Assessment:    Wound Outcome:    (Retired) Wound Type:    (Retired) Wound Length (cm):    (Retired) Wound Width (cm):    (Retired) Depth (cm):    Wound Description (Comments):    Removal Indications:    Wound Image   09/08/23 1533   Wound WDL ex 09/08/23 1533   Dressing Appearance Intact;Moist drainage 09/08/23 1533   Drainage Amount Moderate 09/08/23 1533   Drainage Characteristics/Odor Serosanguineous 09/08/23 1533   Appearance Red;Moist;Sutures intact;Hypergranulation 09/08/23 1533   Tissue loss description Full thickness 09/08/23 1533   Black (%), Wound Tissue Color 0 % 09/08/23 1533   Red (%), Wound Tissue Color 100 % 09/08/23 1533   Yellow (%), Wound Tissue Color 0 % 09/08/23 1533   Periwound Area Pale white;Macerated;Pink 09/08/23 1533   Wound  Edges Defined;Open 09/08/23 1533   Wound Length (cm) 3.2 cm 09/08/23 1533   Wound Width (cm) 0.5 cm 09/08/23 1533   Wound Depth (cm) 0.2 cm 09/08/23 1533   Wound Volume (cm^3) 0.32 cm^3 09/08/23 1533   Wound Surface Area (cm^2) 1.6 cm^2 09/08/23 1533   Tunneling (depth (cm)/location) 0 09/08/23 1533   Undermining (depth (cm)/location) 0 09/08/23 1533   Care Cleansed with:;Antimicrobial agent;Sterile normal saline;Sutures removed 09/08/23 1533   Dressing Changed 09/08/23 1533   Periwound Care Moisture barrier applied;Absorptive dressing applied 09/08/23 1533   Compression Two layer compression 09/08/23 1533   Off Loading Football dressing;Off loading shoe 09/08/23 1533   Dressing Change Due 09/15/23 09/08/23 1533            Incision/Site 07/31/23 1405 Left Foot (Active)   07/31/23 1405   Present Prior to Hospital Arrival?:    Side: Left   Location: Foot   Orientation:    Incision Type:    Closure Method:    Additional Comments:    Removal Indication and Assessment:    Wound Outcome:    Removal Indications:    Wound Image   09/08/23 1533   Incision WDL ex 09/08/23 1533   Dressing Appearance Intact;Dried drainage 09/08/23 1533   Drainage Amount Moderate 09/08/23 1533   Drainage Characteristics/Odor Serosanguineous;No odor 09/08/23 1533   Appearance Sutures intact 09/08/23 1533   Black (%), Wound Tissue Color 0 % 09/08/23 1533   Red (%), Wound Tissue Color 100 % 09/08/23 1533   Yellow (%), Wound Tissue Color 0 % 09/08/23 1533   Periwound Area Dry;Intact 09/08/23 1533   Wound Edges Open 09/08/23 1533   Wound Length (cm) 1.3 cm 09/08/23 1533   Wound Width (cm) 0.1 cm 09/08/23 1533   Wound Depth (cm) 0.2 cm 09/08/23 1533   Wound Volume (cm^3) 0.026 cm^3 09/08/23 1533   Wound Surface Area (cm^2) 0.13 cm^2 09/08/23 1533   Tunneling (depth (cm)/location) 0 09/08/23 1533   Undermining (depth (cm)/location) 0 09/08/23 1533   Care Cleansed with:;Antimicrobial agent;Sterile normal saline 09/08/23 1533   Dressing Changed 09/08/23  1533   Periwound Care Absorptive dressing applied;Topical treatment applied 09/08/23 1533   Compression Two layer compression 09/08/23 1533   Off Loading Football dressing;Off loading shoe 09/08/23 1533   Dressing Change Due 09/15/23 09/08/23 1533             Plan:              Education about the prevention of limb loss.    Discussed wound healing cycle, skin integrity, ways to care for skin.Counseled patient on the effects of biomechanical pressure,  PVD, and blood glucose on healing. He verbalizes understanding that it can increase the chances of delayed healing and this prolonged exposure leads to infection or progression of infection which subsequently can result in loss of limb.    Adequate vitamin supplementation, protein intake, and hydration - discussed with patient    The wound is cleansed of foreign material as much as possible and the base inspected for bone or abscess.      Sutures appear intact to both Dorsal and Plantar aspect of Left Foot. Removed sutures from plantar aspect of Left Foot, hyper granulating tissue noted within wound bed.     He will continue to be primarily nonweightbearing to the surgical limb with scooter assisted ambulation.    Return to clinic in 1 week.    Short-term goals include maintaining good offloading and minimizing bioburden, promoting granulation and epithelialization to healing.  Long-term goals include keeping the wound healed by good offloading and medical management under the direction of internist.      Procedures      Tissue pathology and/or culture taken     [] Yes      [x] No  Sharp debridement performed                   [] Yes       [x] No  Labs ordered     [] Yes       [x] No  Imaging ordered    [] Yes      [x] No    Orders Placed This Encounter   Procedures    Change dressing     Wound Dressing Orders     Dressing change frequency once a week   Remove old dressing   Cleanse or irrigate with: Normal Saline   Protect periwound with: Gentian Violet hugging  "plantar wound bed    Primary dressing: WOUND BASE: Iodoflex to Dorsal (suture line), Mepilex Ag (with slit to allow for drainage)    Secondary dressing: Mextra 5" x 9", Abram foam long x 2   Off-load/Compression: Football dressing (Cast Padding x 2 rolls); Calamine Coflex. Darco shoe. Knee scooter for LLE to off-load     Return to clinic in 1 week.          Follow up in about 1 week (around 9/15/2023) for wound care.       "

## 2023-09-11 ENCOUNTER — PATIENT MESSAGE (OUTPATIENT)
Dept: ENDOSCOPY | Facility: HOSPITAL | Age: 53
End: 2023-09-11
Payer: MEDICARE

## 2023-09-11 ENCOUNTER — OFFICE VISIT (OUTPATIENT)
Dept: INFECTIOUS DISEASES | Facility: CLINIC | Age: 53
End: 2023-09-11
Payer: MEDICARE

## 2023-09-11 ENCOUNTER — INFUSION (OUTPATIENT)
Dept: INFECTIOUS DISEASES | Facility: HOSPITAL | Age: 53
End: 2023-09-11
Attending: INTERNAL MEDICINE
Payer: MEDICARE

## 2023-09-11 ENCOUNTER — PATIENT MESSAGE (OUTPATIENT)
Dept: ADMINISTRATIVE | Facility: HOSPITAL | Age: 53
End: 2023-09-11
Payer: MEDICARE

## 2023-09-11 VITALS
OXYGEN SATURATION: 99 % | SYSTOLIC BLOOD PRESSURE: 127 MMHG | HEIGHT: 73 IN | DIASTOLIC BLOOD PRESSURE: 82 MMHG | TEMPERATURE: 98 F | HEART RATE: 94 BPM | RESPIRATION RATE: 16 BRPM | BODY MASS INDEX: 28.89 KG/M2 | WEIGHT: 218 LBS

## 2023-09-11 VITALS
TEMPERATURE: 98 F | WEIGHT: 218.69 LBS | SYSTOLIC BLOOD PRESSURE: 133 MMHG | DIASTOLIC BLOOD PRESSURE: 76 MMHG | BODY MASS INDEX: 28.98 KG/M2 | HEART RATE: 102 BPM | HEIGHT: 73 IN

## 2023-09-11 DIAGNOSIS — Z51.81 THERAPEUTIC DRUG MONITORING: ICD-10-CM

## 2023-09-11 DIAGNOSIS — M86.00 ACUTE HEMATOGENOUS OSTEOMYELITIS, UNSPECIFIED SITE: Primary | ICD-10-CM

## 2023-09-11 DIAGNOSIS — Z12.11 SCREEN FOR COLON CANCER: ICD-10-CM

## 2023-09-11 DIAGNOSIS — M86.00 ACUTE HEMATOGENOUS OSTEOMYELITIS, UNSPECIFIED SITE: ICD-10-CM

## 2023-09-11 LAB
ALBUMIN SERPL BCP-MCNC: 3.4 G/DL (ref 3.5–5.2)
ALP SERPL-CCNC: 179 U/L (ref 55–135)
ALT SERPL W/O P-5'-P-CCNC: 23 U/L (ref 10–44)
ANION GAP SERPL CALC-SCNC: 11 MMOL/L (ref 8–16)
AST SERPL-CCNC: 21 U/L (ref 10–40)
BASOPHILS # BLD AUTO: 0.03 K/UL (ref 0–0.2)
BASOPHILS NFR BLD: 0.6 % (ref 0–1.9)
BILIRUB SERPL-MCNC: 0.6 MG/DL (ref 0.1–1)
BUN SERPL-MCNC: 11 MG/DL (ref 6–20)
CALCIUM SERPL-MCNC: 9.4 MG/DL (ref 8.7–10.5)
CHLORIDE SERPL-SCNC: 108 MMOL/L (ref 95–110)
CO2 SERPL-SCNC: 20 MMOL/L (ref 23–29)
CREAT SERPL-MCNC: 1.1 MG/DL (ref 0.5–1.4)
CRP SERPL-MCNC: 1.4 MG/L (ref 0–8.2)
DIFFERENTIAL METHOD: ABNORMAL
EOSINOPHIL # BLD AUTO: 0.1 K/UL (ref 0–0.5)
EOSINOPHIL NFR BLD: 2.9 % (ref 0–8)
ERYTHROCYTE [DISTWIDTH] IN BLOOD BY AUTOMATED COUNT: 14.8 % (ref 11.5–14.5)
EST. GFR  (NO RACE VARIABLE): >60 ML/MIN/1.73 M^2
GLUCOSE SERPL-MCNC: 195 MG/DL (ref 70–110)
HCT VFR BLD AUTO: 40.6 % (ref 40–54)
HGB BLD-MCNC: 12.9 G/DL (ref 14–18)
IMM GRANULOCYTES # BLD AUTO: 0.01 K/UL (ref 0–0.04)
IMM GRANULOCYTES NFR BLD AUTO: 0.2 % (ref 0–0.5)
LYMPHOCYTES # BLD AUTO: 1.6 K/UL (ref 1–4.8)
LYMPHOCYTES NFR BLD: 33.8 % (ref 18–48)
MCH RBC QN AUTO: 28.8 PG (ref 27–31)
MCHC RBC AUTO-ENTMCNC: 31.8 G/DL (ref 32–36)
MCV RBC AUTO: 91 FL (ref 82–98)
MONOCYTES # BLD AUTO: 0.3 K/UL (ref 0.3–1)
MONOCYTES NFR BLD: 6.7 % (ref 4–15)
NEUTROPHILS # BLD AUTO: 2.7 K/UL (ref 1.8–7.7)
NEUTROPHILS NFR BLD: 55.8 % (ref 38–73)
NRBC BLD-RTO: 0 /100 WBC
PLATELET # BLD AUTO: 208 K/UL (ref 150–450)
PMV BLD AUTO: 12.2 FL (ref 9.2–12.9)
POTASSIUM SERPL-SCNC: 4 MMOL/L (ref 3.5–5.1)
PROCALCITONIN SERPL IA-MCNC: <0.02 NG/ML
PROT SERPL-MCNC: 7.7 G/DL (ref 6–8.4)
RBC # BLD AUTO: 4.48 M/UL (ref 4.6–6.2)
SODIUM SERPL-SCNC: 139 MMOL/L (ref 136–145)
WBC # BLD AUTO: 4.8 K/UL (ref 3.9–12.7)

## 2023-09-11 PROCEDURE — 85025 COMPLETE CBC W/AUTO DIFF WBC: CPT | Performed by: PHYSICIAN ASSISTANT

## 2023-09-11 PROCEDURE — 80053 COMPREHEN METABOLIC PANEL: CPT | Performed by: PHYSICIAN ASSISTANT

## 2023-09-11 PROCEDURE — 36415 COLL VENOUS BLD VENIPUNCTURE: CPT | Performed by: PHYSICIAN ASSISTANT

## 2023-09-11 PROCEDURE — 99213 OFFICE O/P EST LOW 20 MIN: CPT | Mod: PBBFAC | Performed by: PHYSICIAN ASSISTANT

## 2023-09-11 PROCEDURE — 99999 PR PBB SHADOW E&M-EST. PATIENT-LVL III: CPT | Mod: PBBFAC,,, | Performed by: PHYSICIAN ASSISTANT

## 2023-09-11 PROCEDURE — 99214 PR OFFICE/OUTPT VISIT, EST, LEVL IV, 30-39 MIN: ICD-10-PCS | Mod: S$PBB,,, | Performed by: PHYSICIAN ASSISTANT

## 2023-09-11 PROCEDURE — 99999 PR PBB SHADOW E&M-EST. PATIENT-LVL III: ICD-10-PCS | Mod: PBBFAC,,, | Performed by: PHYSICIAN ASSISTANT

## 2023-09-11 PROCEDURE — 99214 OFFICE O/P EST MOD 30 MIN: CPT | Mod: S$PBB,,, | Performed by: PHYSICIAN ASSISTANT

## 2023-09-11 PROCEDURE — 84145 PROCALCITONIN (PCT): CPT | Performed by: PHYSICIAN ASSISTANT

## 2023-09-11 PROCEDURE — 86140 C-REACTIVE PROTEIN: CPT | Performed by: PHYSICIAN ASSISTANT

## 2023-09-11 RX ORDER — AMOXICILLIN AND CLAVULANATE POTASSIUM 875; 125 MG/1; MG/1
1 TABLET, FILM COATED ORAL 2 TIMES DAILY
Qty: 60 TABLET | Refills: 5 | Status: SHIPPED | OUTPATIENT
Start: 2023-09-11

## 2023-09-11 RX ORDER — POLYETHYLENE GLYCOL 3350, SODIUM SULFATE ANHYDROUS, SODIUM BICARBONATE, SODIUM CHLORIDE, POTASSIUM CHLORIDE 236; 22.74; 6.74; 5.86; 2.97 G/4L; G/4L; G/4L; G/4L; G/4L
4 POWDER, FOR SOLUTION ORAL ONCE
Qty: 4000 ML | Refills: 0 | Status: SHIPPED | OUTPATIENT
Start: 2023-09-11 | End: 2023-09-12

## 2023-09-11 NOTE — PROGRESS NOTES
Pt arrives to clinic for PICC removal and labs; Orders checked and are signed by MD; Labs drawn from red lumen of ROBBIN PICC;  Labeled at bedside, pt confirmed, and sent to lab via ;   pt educated on site care with pressure dressing for picc removal;   SUELLEN PICC d/c'd by RN and wrapped with a pressure dressing of Steril Gauze, Vaseline Gauze and coban; pt monitored for 30 mins post removal of PICC, and has no s/s of distress adverse effects; pt tolerated well and d/c'd from clinic;

## 2023-09-12 ENCOUNTER — TELEPHONE (OUTPATIENT)
Dept: ENDOSCOPY | Facility: HOSPITAL | Age: 53
End: 2023-09-12
Payer: MEDICARE

## 2023-09-12 NOTE — TELEPHONE ENCOUNTER
Spoke with patient. Patient states   Golytely was already sent to pharmacy and he has picked it up from pharmacy. Updated instructions sent to portal.

## 2023-09-12 NOTE — TELEPHONE ENCOUNTER
----- Message from Anastasia Mackay sent at 9/12/2023  8:15 AM CDT -----  Regarding: FW: Patient's prep for colonoscopy is not in for procedure 9/13/2023  Contact: @[phone]    ----- Message -----  From: Natalie Gonzalez  Sent: 9/11/2023   4:54 PM CDT  To: [facility] Schedulers; Argelia Valencia Staff  Subject: Patient's prep for colonoscopy is not in for#    Regarding:Patient's prep for colonoscopy is not in for procedure 9/13/2023    Contact: Indio    Type: Pharmacy scrip sent for prep    Who Called: Indio    Would the patient rather a call back or a response via [facility]?     Best Call Back Number: [phone]      Additional Information:       [facility]  [address]  [place] [address]  Phone: [phone] Fax: [phone]

## 2023-09-13 ENCOUNTER — HOSPITAL ENCOUNTER (OUTPATIENT)
Facility: HOSPITAL | Age: 53
Discharge: HOME | End: 2023-09-13
Attending: STUDENT IN AN ORGANIZED HEALTH CARE EDUCATION/TRAINING PROGRAM | Admitting: STUDENT IN AN ORGANIZED HEALTH CARE EDUCATION/TRAINING PROGRAM
Payer: MEDICARE

## 2023-09-13 ENCOUNTER — ANESTHESIA (OUTPATIENT)
Dept: ENDOSCOPY | Facility: HOSPITAL | Age: 53
End: 2023-09-13
Payer: MEDICARE

## 2023-09-13 ENCOUNTER — ANESTHESIA EVENT (OUTPATIENT)
Dept: ENDOSCOPY | Facility: HOSPITAL | Age: 53
End: 2023-09-13
Payer: MEDICARE

## 2023-09-13 VITALS
DIASTOLIC BLOOD PRESSURE: 69 MMHG | TEMPERATURE: 98 F | SYSTOLIC BLOOD PRESSURE: 110 MMHG | RESPIRATION RATE: 16 BRPM | HEIGHT: 73 IN | OXYGEN SATURATION: 99 % | HEART RATE: 81 BPM | BODY MASS INDEX: 29.16 KG/M2 | WEIGHT: 220 LBS

## 2023-09-13 DIAGNOSIS — C18.9 COLON ADENOCARCINOMA: Primary | ICD-10-CM

## 2023-09-13 DIAGNOSIS — Z12.11 ENCOUNTER FOR SCREENING COLONOSCOPY: ICD-10-CM

## 2023-09-13 LAB — POCT GLUCOSE: 145 MG/DL (ref 70–110)

## 2023-09-13 PROCEDURE — 45380 PR COLONOSCOPY,BIOPSY: ICD-10-PCS | Mod: PT,,, | Performed by: STUDENT IN AN ORGANIZED HEALTH CARE EDUCATION/TRAINING PROGRAM

## 2023-09-13 PROCEDURE — E9220 PRA ENDO ANESTHESIA: HCPCS | Mod: PT,,, | Performed by: NURSE ANESTHETIST, CERTIFIED REGISTERED

## 2023-09-13 PROCEDURE — 45380 COLONOSCOPY AND BIOPSY: CPT | Mod: PT,,, | Performed by: STUDENT IN AN ORGANIZED HEALTH CARE EDUCATION/TRAINING PROGRAM

## 2023-09-13 PROCEDURE — 37000008 HC ANESTHESIA 1ST 15 MINUTES: Performed by: STUDENT IN AN ORGANIZED HEALTH CARE EDUCATION/TRAINING PROGRAM

## 2023-09-13 PROCEDURE — 27201089 HC SNARE, DISP (ANY): Performed by: STUDENT IN AN ORGANIZED HEALTH CARE EDUCATION/TRAINING PROGRAM

## 2023-09-13 PROCEDURE — 88305 TISSUE EXAM BY PATHOLOGIST: ICD-10-PCS | Mod: 26,,, | Performed by: STUDENT IN AN ORGANIZED HEALTH CARE EDUCATION/TRAINING PROGRAM

## 2023-09-13 PROCEDURE — 88305 TISSUE EXAM BY PATHOLOGIST: CPT | Mod: 26,,, | Performed by: STUDENT IN AN ORGANIZED HEALTH CARE EDUCATION/TRAINING PROGRAM

## 2023-09-13 PROCEDURE — 37000009 HC ANESTHESIA EA ADD 15 MINS: Performed by: STUDENT IN AN ORGANIZED HEALTH CARE EDUCATION/TRAINING PROGRAM

## 2023-09-13 PROCEDURE — 25000003 PHARM REV CODE 250: Performed by: STUDENT IN AN ORGANIZED HEALTH CARE EDUCATION/TRAINING PROGRAM

## 2023-09-13 PROCEDURE — 25000003 PHARM REV CODE 250: Performed by: NURSE ANESTHETIST, CERTIFIED REGISTERED

## 2023-09-13 PROCEDURE — 63600175 PHARM REV CODE 636 W HCPCS: Performed by: NURSE ANESTHETIST, CERTIFIED REGISTERED

## 2023-09-13 PROCEDURE — 45380 COLONOSCOPY AND BIOPSY: CPT | Mod: PT | Performed by: STUDENT IN AN ORGANIZED HEALTH CARE EDUCATION/TRAINING PROGRAM

## 2023-09-13 PROCEDURE — 88305 TISSUE EXAM BY PATHOLOGIST: CPT | Performed by: STUDENT IN AN ORGANIZED HEALTH CARE EDUCATION/TRAINING PROGRAM

## 2023-09-13 PROCEDURE — E9220 PRA ENDO ANESTHESIA: ICD-10-PCS | Mod: PT,,, | Performed by: NURSE ANESTHETIST, CERTIFIED REGISTERED

## 2023-09-13 RX ORDER — LIDOCAINE HYDROCHLORIDE 20 MG/ML
INJECTION, SOLUTION EPIDURAL; INFILTRATION; INTRACAUDAL; PERINEURAL
Status: DISCONTINUED | OUTPATIENT
Start: 2023-09-13 | End: 2023-09-13

## 2023-09-13 RX ORDER — PHENYLEPHRINE HYDROCHLORIDE 10 MG/ML
INJECTION INTRAVENOUS
Status: DISCONTINUED | OUTPATIENT
Start: 2023-09-13 | End: 2023-09-13

## 2023-09-13 RX ORDER — PROPOFOL 10 MG/ML
VIAL (ML) INTRAVENOUS
Status: DISCONTINUED | OUTPATIENT
Start: 2023-09-13 | End: 2023-09-13

## 2023-09-13 RX ORDER — SODIUM CHLORIDE 9 MG/ML
INJECTION, SOLUTION INTRAVENOUS CONTINUOUS
Status: DISCONTINUED | OUTPATIENT
Start: 2023-09-13 | End: 2023-09-13 | Stop reason: HOSPADM

## 2023-09-13 RX ADMIN — PHENYLEPHRINE HYDROCHLORIDE 100 MCG: 10 INJECTION INTRAVENOUS at 11:09

## 2023-09-13 RX ADMIN — PROPOFOL 150 MCG/KG/MIN: 10 INJECTION, EMULSION INTRAVENOUS at 11:09

## 2023-09-13 RX ADMIN — LIDOCAINE HYDROCHLORIDE 50 MG: 20 INJECTION, SOLUTION EPIDURAL; INFILTRATION; INTRACAUDAL at 11:09

## 2023-09-13 RX ADMIN — SODIUM CHLORIDE: 0.9 INJECTION, SOLUTION INTRAVENOUS at 11:09

## 2023-09-13 RX ADMIN — PROPOFOL 80 MG: 10 INJECTION, EMULSION INTRAVENOUS at 11:09

## 2023-09-13 NOTE — TRANSFER OF CARE
"Anesthesia Transfer of Care Note    Patient: Indio Ryder Jr.    Procedure(s) Performed: Procedure(s) (LRB):  COLONOSCOPY (N/A)    Patient location: GI    Anesthesia Type: general    Transport from OR: Transported from OR on room air with adequate spontaneous ventilation    Post pain: adequate analgesia    Post assessment: no apparent anesthetic complications and tolerated procedure well    Post vital signs: stable    Level of consciousness: sedated and responds to stimulation    Nausea/Vomiting: no nausea/vomiting    Complications: none    Transfer of care protocol was followed      Last vitals:   Visit Vitals  /80   Pulse 93   Temp 36.4 °C (97.5 °F) (Tympanic)   Resp 14   Ht 6' 1" (1.854 m)   Wt 99.8 kg (220 lb)   SpO2 100%   BMI 29.03 kg/m²     "

## 2023-09-13 NOTE — ANESTHESIA POSTPROCEDURE EVALUATION
Anesthesia Post Evaluation    Patient: Indio Ryder Jr.    Procedure(s) Performed: Procedure(s) (LRB):  COLONOSCOPY (N/A)    Final Anesthesia Type: general      Patient location during evaluation: GI PACU  Patient participation: Yes- Able to Participate  Level of consciousness: awake and alert  Post-procedure vital signs: reviewed and stable  Pain management: adequate  Airway patency: patent    PONV status at discharge: No PONV  Anesthetic complications: no      Cardiovascular status: blood pressure returned to baseline  Respiratory status: unassisted  Hydration status: euvolemic  Follow-up not needed.          Vitals Value Taken Time   /69 09/13/23 1242   Temp 36.6 °C (97.9 °F) 09/13/23 1212   Pulse 81 09/13/23 1242   Resp 16 09/13/23 1242   SpO2 99 % 09/13/23 1242         Event Time   Out of Recovery 12:52:05         Pain/Jenny Score: Jenny Score: 10 (9/13/2023 12:42 PM)

## 2023-09-13 NOTE — H&P
[facility]  Colon and Rectal Surgery    [address]  [place], [place]  Tel: [phone]  Fax: [phone]  [email or web address]   MD Fadi Chapman MD Brian Kann, MD W. Forrest Johnston, MD Matthew Giglia, MD Jennifer Paruch, MD William Kethman, MD Danielle Kay, MD     Patient name: Indio Ryder Jr.   YOB: 1970   MRN: [id number]  Date of procedure: 09/13/2023    Procedure: Colonoscopy  Indications: Previous colon cancer    Last colonoscopy: 3/21/2023 (Complete)    The patient was informed of the availability of a certified  without charge. A certified  was not necessary for this visit.    Sedation plan: MAC  ASA: ASA 2 - Patient with mild systemic disease with no functional limitations    Review of Systems  See above    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    Right foot    Colon adenocarcinoma 04/12/2022    Diabetic ketoacidosis without coma associated with type 2 diabetes mellitus 05/30/2017    Diabetic ulcer of right foot associated with type 2 diabetes mellitus 06/03/2015    Disorder of kidney and ureter     Essential hypertension 06/06/2013    Group B streptococcal infection 01/13/2017    Mixed hyperlipidemia 08/12/2014    Septic arthritis of left foot 04/28/2021    Shoulder impingement 08/12/2014    Subacute osteomyelitis of right foot 01/12/2017    Streptococcus agalactiae, Actinomyces odontolyticus    Traumatic amputation of fifth toe of right foot 07/02/2015    Type 2 diabetes mellitus with diabetic neuropathy, with long-term current use of insulin 05/03/2016     Past Surgical History:   Procedure Laterality Date    COLONOSCOPY Right 1/19/2022    Procedure: COLONOSCOPY;  Surgeon: Tj Haile MD;  Location: [facility] ([address] [place] [address]);  Service: Endoscopy;  Laterality: Right;  previous order un-usable/poor prep 1/18/22  RAPID COVID test arrival 12:20  instructions  handed to pt-     COLONOSCOPY N/A 3/21/2022    Procedure: COLONOSCOPY;  Surgeon: Sheng Haque MD;  Location: [facility] (2ND FLR);  Service: Endoscopy;  Laterality: N/A;  Colonoscopy with AES for hepatix flexure polyp removal, 2nd floor  Pt is fully vaccinated-DS  Pt prescribed Plavix by Dr. Stahl in Jan. 2022, however pt states that he never started taking it-DS  3/16/22-Instructions sent via portal-DS    DEBRIDEMENT OF FOOT Left 7/14/2019    Procedure: DEBRIDEMENT, FOOT, with left 5th ray amputation;  Surgeon: Sis Hickman DPM;  Location: [facility] OR 2ND FLR;  Service: Podiatry;  Laterality: Left;    DEBRIDEMENT OF FOOT Left 7/17/2019    Procedure: DEBRIDEMENT, FOOT with leftt 5th ray partial amputation with Neox Graft;  Surgeon: Mai Burrell DPM;  Location: [facility] OR 2ND FLR;  Service: Podiatry;  Laterality: Left;  t    DEBRIDEMENT OF FOOT Bilateral 4/29/2021    Procedure: DEBRIDEMENT, FOOT;  Surgeon: Mai Burrell DPM;  Location: [facility] OR 1ST FLR;  Service: Podiatry;  Laterality: Bilateral;  Graft application    DEBRIDEMENT OF FOOT Left 7/31/2023    Procedure: DEBRIDEMENT, FOOT;  Surgeon: Marivel Peterson DPM;  Location: [facility] OR 2ND FLR;  Service: Podiatry;  Laterality: Left;    TOE AMPUTATION Right 06/05/2015    TOE AMPUTATION Right 01/19/2017    XI ROBOTIC COLECTOMY, RIGHT N/A 4/25/2022    Procedure: XI ROBOTIC COLECTOMY, RIGHT (lithotomy, eras low);  Surgeon: Erik Stout MD;  [facility];  Service: Colon and Rectal;  Laterality: N/A;  Paruch to Goodwin    XI ROBOTIC COLECTOMY, RIGHT  4/25/2022    Procedure: ;  Surgeon: Erik Stout MD;  Location: [facility] OR 2ND FLR;  Service: Colon and Rectal;;     Family History   Adopted: Yes   Problem Relation Age of Onset    Hypertension Mother     Cancer Mother         breast    Diabetes Mother     Hypertension Father     Cataracts Father     Heart disease Father         mi    Diabetes Sister     No Known Problems Brother      Heart disease Sister         MI    No Known Problems Sister     Hypertension Maternal Aunt     No Known Problems Maternal Uncle     No Known Problems Paternal Aunt     No Known Problems Paternal Uncle     No Known Problems Maternal Grandmother     No Known Problems Maternal Grandfather     No Known Problems Paternal Grandmother     No Known Problems Paternal Grandfather     Amblyopia Neg Hx     Blindness Neg Hx     Glaucoma Neg Hx     Macular degeneration Neg Hx     Retinal detachment Neg Hx     Strabismus Neg Hx     Stroke Neg Hx     Thyroid disease Neg Hx      Social History     Tobacco Use    Smoking status: Never    Smokeless tobacco: Never   Substance Use Topics    Alcohol use: No    Drug use: No     Review of patient's allergies indicates:  No Known Allergies    Prior to Admission medications    Medication Sig Start Date End Date Taking? Authorizing Provider   amoxicillin-clavulanate 875-125mg (AUGMENTIN) 875-125 mg per tablet Take 1 tablet by mouth 2 (two) times a day. 9/11/23   Damien Richey Jr., PA   atorvastatin (LIPITOR) 80 MG tablet Take 1 tablet (80 mg total) by mouth once daily. 7/27/23 7/26/24  Fadi James MD   DAPTOmycin (CUBICIN) 500 mg injection Inject into the vein. 8/8/23   Provider, Historical   empagliflozin (JARDIANCE) 10 mg tablet Take 1 tablet (10 mg total) by mouth once daily. 1/17/23   Gigi Clay, JESUS, VAP   HYDROcodone-acetaminophen (NORCO) 5-325 mg per tablet Take 1 tablet by mouth every 6 (six) hours as needed for Pain. 7/31/23   Kristen, Marivel O., DPM   insulin aspart U-100 (NOVOLOG) 100 unit/mL (3 mL) InPn pen Inject 8 units before meals plus scale 150-200+2, 201-250+4, 251-300+6, 301-350+8, >350+10. Max daily 54 units. 8/26/21   Gigi Clay APRN, FNP   insulin detemir U-100 (LEVEMIR FLEXTOUCH) 100 unit/mL (3 mL) SubQ InPn pen Inject 26 Units into the skin every evening. 12/21/22 12/21/23  Gigi Clay APRN, FNP   ONETOUCH DELICA LANCETS 33 gauge [facility]   "5/4/17   Provider, Historical   ONETOUCH ULTRA2 kit  3/29/17   Provider, Historical   pen needle, diabetic (BD SASCHA 2ND GEN PEN NEEDLE) 32 gauge x 5/32" Ndle Use 4 times a day  Patient taking differently: Use 4 times a day 8/26/21   Gigi Clay, APRN, FNP   polyethylene glycol (GAVILYTE-G) 236-22.74-6.74 -5.86 gram suspension Take 4,000 mLs (4 L total) by mouth once. for 1 dose 9/11/23 9/12/23  Erik Stout MD   semaglutide (OZEMPIC) 1 mg/dose (4 mg/3 mL) Inject 1 mg into the skin every 7 days. 1/17/23 1/17/24  Gigi Clay, JESUS, FNP       Physical Examination  /80   Pulse 93   Temp 97.5 °F (36.4 °C) (Tympanic)   Resp 14   Ht 6' 1" (1.854 m)   Wt 99.8 kg (220 lb)   SpO2 100%   BMI 29.03 kg/m²      Constitutional: well developed, no cough, no dyspnea, alert, and no acute distress    Head: Normocephalic, no lesions, without obvious abnormality  Eye: Normal external eye, conjunctiva, and lids, PERRL  Cardiovascular: regular rate and regular rhythm  Respiratory: normal air entry  Gastrointestinal: soft, non-tender, without masses or organomegaly  Neurologic: alert, oriented, normal speech, no focal findings or movement disorder noted  Psychiatric: appropriate, normal mood    Plan of Care    It was a pleasure meeting Mr. Ryder today - we will plan to perform a colonoscopy with monitored anesthesia care. The details of the procedure, the possible need for biopsy or polypectomy and the potential risks including bleeding, perforation, missed polyps were discussed in detail and they consented to undergo the procedure.      Erik Stout MD, FACS, FASCRS  Department of Colon & Rectal Surgery  [facility]    "

## 2023-09-13 NOTE — ANESTHESIA PREPROCEDURE EVALUATION
09/13/2023  Indio Ryder Jr. is a 53 y.o., male.    Pre-operative evaluation for Procedure(s) (LRB):  COLONOSCOPY (N/A)    Indio Ryder Jr. is a 53 y.o. male     Patient Active Problem List   Diagnosis    Essential hypertension    Mixed hyperlipidemia    Type 2 diabetes mellitus with hyperglycemia, with long-term current use of insulin    Actinomyces infection    Hypokalemia    Neck pain    Diabetic ulcer of right midfoot associated with type 2 diabetes mellitus, with necrosis of bone    Severe malnutrition    Diabetic ulcer of left midfoot associated with type 2 diabetes mellitus, with bone involvement without evidence of necrosis    Hypertensive retinopathy, bilateral    Subacute osteomyelitis of left foot    Allergic reaction due to antibacterial drug    Chronic left shoulder pain    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 edema, with long-term current use of insulin    Diabetic ulcer of left foot    Septic arthritis of left foot    Acute on chronic osteomyelitis    Colon adenocarcinoma    Diabetic ulcer of right foot associated with type 2 diabetes mellitus, with fat layer exposed    Chronic osteomyelitis of right foot    Pre-ulcerative calluses       Review of patient's allergies indicates:  No Known Allergies    No current facility-administered medications on file prior to encounter.     Current Outpatient Medications on File Prior to Encounter   Medication Sig Dispense Refill    empagliflozin (JARDIANCE) 10 mg tablet Take 1 tablet (10 mg total) by mouth once daily. 30 tablet 6    insulin aspart U-100 (NOVOLOG) 100 unit/mL (3 mL) InPn pen Inject 8 units before meals plus scale 150-200+2, 201-250+4, 251-300+6, 301-350+8, >350+10. Max daily 54 units. 15 mL 6    insulin detemir U-100 (LEVEMIR FLEXTOUCH)  "100 unit/mL (3 mL) SubQ InPn pen Inject 26 Units into the skin every evening. 15 mL 6    ONETOUCH DELICA LANCETS 33 gauge Misc       ONETOUCH ULTRA2 kit       pen needle, diabetic (BD SASCHA 2ND GEN PEN NEEDLE) 32 gauge x 5/32" Ndle Use 4 times a day (Patient taking differently: Use 4 times a day) 400 each 3    semaglutide (OZEMPIC) 1 mg/dose (4 mg/3 mL) Inject 1 mg into the skin every 7 days. 3 pen 3       Past Surgical History:   Procedure Laterality Date    COLONOSCOPY Right 1/19/2022    Procedure: COLONOSCOPY;  Surgeon: Tj Haile MD;  Location: [facility] ENDO (4TH FLR);  Service: Endoscopy;  Laterality: Right;  previous order un-usable/poor prep 1/18/22  RAPID COVID test arrival 12:20  instructions handed to pt- sm    COLONOSCOPY N/A 3/21/2022    Procedure: COLONOSCOPY;  Surgeon: Sheng Haque MD;  Location: [facility] ENDO (2ND FLR);  Service: Endoscopy;  Laterality: N/A;  Colonoscopy with AES for hepatix flexure polyp removal, 2nd floor  Pt is fully vaccinated-DS  Pt prescribed Plavix by Dr. Stahl in Jan. 2022, however pt states that he never started taking it-DS  3/16/22-Instructions sent via portal-DS    DEBRIDEMENT OF FOOT Left 7/14/2019    Procedure: DEBRIDEMENT, FOOT, with left 5th ray amputation;  Surgeon: Sis Hickman DPM;  Location: [facility] OR 2ND FLR;  Service: Podiatry;  Laterality: Left;    DEBRIDEMENT OF FOOT Left 7/17/2019    Procedure: DEBRIDEMENT, FOOT with leftt 5th ray partial amputation with Neox Graft;  Surgeon: Mai Burrell DPM;  Location: [facility] OR 2ND FLR;  Service: Podiatry;  Laterality: Left;  t    DEBRIDEMENT OF FOOT Bilateral 4/29/2021    Procedure: DEBRIDEMENT, FOOT;  Surgeon: Mai Burrell DPM;  Location: [facility] OR 1ST FLR;  Service: Podiatry;  Laterality: Bilateral;  Graft application    DEBRIDEMENT OF FOOT Left 7/31/2023    Procedure: DEBRIDEMENT, FOOT;  Surgeon: Marivel Peterson DPM;  Location: [facility] OR 2ND FLR;  Service: Podiatry;  Laterality: Left;    TOE " AMPUTATION Right 06/05/2015    TOE AMPUTATION Right 01/19/2017    XI ROBOTIC COLECTOMY, RIGHT N/A 4/25/2022    Procedure: XI ROBOTIC COLECTOMY, RIGHT (lithotomy, eras low);  Surgeon: Erik Stout MD;  Location: [facility] OR 2ND FLR;  Service: Colon and Rectal;  Laterality: N/A;  Paruch to Goodwin    XI ROBOTIC COLECTOMY, RIGHT  4/25/2022    Procedure: ;  Surgeon: Erik Stout MD;  Location: [facility] OR 2ND FLR;  Service: Colon and Rectal;;         CBC:  Lab Results   Component Value Date    WBC 4.80 09/11/2023    RBC 4.48 (L) 09/11/2023    HGB 12.9 (L) 09/11/2023    HCT 40.6 09/11/2023     09/11/2023    MCV 91 09/11/2023    MCH 28.8 09/11/2023    MCHC 31.8 (L) 09/11/2023       CMP:   Lab Results   Component Value Date     09/11/2023    K 4.0 09/11/2023     09/11/2023    CO2 20 (L) 09/11/2023    BUN 11 09/11/2023    CREATININE 1.1 09/11/2023     (H) 09/11/2023    CALCIUM 9.4 09/11/2023    ALBUMIN 3.4 (L) 09/11/2023    PROT 7.7 09/11/2023    ALKPHOS 179 (H) 09/11/2023    ALT 23 09/11/2023    AST 21 09/11/2023    BILITOT 0.6 09/11/2023       INR:  Lab Results   Component Value Date    INR 1.0 07/24/2023         Diagnostic Studies:      EKG:   Results for orders placed or performed during the hospital encounter of 07/19/23   ECG 12 lead    Collection Time: 07/19/23  3:37 PM    Narrative    Test Reason : M86.272,E13.621,L97.529,Z51.81,    Vent. Rate : 091 BPM     Atrial Rate : 091 BPM     P-R Int : 142 ms          QRS Dur : 096 ms      QT Int : 390 ms       P-R-T Axes : 073 081 011 degrees     QTc Int : 479 ms    Normal sinus rhythm  T wave abnormality, consider inferior ischemia  Abnormal ECG  When compared with ECG of 14-APR-2022 11:23,  No significant change was found  Confirmed by Lauro Chavez MD ([id number]) on 7/20/2023 8:01:40 PM    Referred By: PARKER DESAI           Confirmed By:Lauro Chavez MD        2D Echo:  No results found. However, due to the size of the patient  "record, not all encounters were searched. Please check Results Review for a complete set of results.    Stress Test:   Results for orders placed during the hospital encounter of 02/28/22    Nuclear Stress - Cardiology Interpreted    Interpretation Summary    Normal myocardial perfusion scan. There is no evidence of myocardial ischemia or infarction.    There is a  mild intensity fixed perfusion abnormality in the inferior wall of the left ventricle secondary to diaphragm attenuation.    The gated perfusion images showed an ejection fraction of 50% at rest.    The EKG portion of this study is negative for ischemia.    The patient reported no chest pain during the stress test.    There were no arrhythmias during stress.      Pre-op Vitals [09/13/23 1102]   BP Pulse Resp Temp SpO2   133/80 93 14 36.4 °C (97.5 °F) 100 %      Height Weight BMI (Calculated)     6' 1" 220 lb 29           Pre-op Assessment    I have reviewed the Patient Summary Reports.     I have reviewed the Nursing Notes.       Review of Systems  Social:  No Alcohol Use    Cardiovascular:   Exercise tolerance: poor Hypertension    Renal/:   Chronic Renal Disease    Endocrine:   Diabetes, poorly controlled, using insulin        Physical Exam  General: Well nourished    Airway:  Mallampati: II   Mouth Opening: Normal  TM Distance: Normal      Dental:  Intact    Chest/Lungs:  Normal Respiratory Rate    Heart:  Rhythm: Regular Rhythm        Anesthesia Plan  Type of Anesthesia, risks & benefits discussed:    Anesthesia Type: Gen Natural Airway  Intra-op Monitoring Plan: Standard ASA Monitors  Post Op Pain Control Plan: IV/PO Opioids PRN  Induction:  IV  Informed Consent: Informed consent signed with the Patient and all parties understand the risks and agree with anesthesia plan.  All questions answered.   ASA Score: 3    Ready For Surgery From Anesthesia Perspective.     .      "

## 2023-09-13 NOTE — PROVATION PATIENT INSTRUCTIONS
Discharge Summary/Instructions after an Endoscopic Procedure  Patient Name: Indio Ryder  Patient MRN: [id number]  Patient YOB: 1970 Wednesday, September 13, 2023  Erik Stout MD  Dear patient,  As a result of recent federal legislation (The Federal Cures Act), you may   receive lab or pathology results from your procedure in your [facility]   account before your physician is able to contact you. Your physician or   their representative will relay the results to you with their   recommendations at their soonest availability.  Thank you,  RESTRICTIONS:  During your procedure today, you received medications for sedation.  These   medications may affect your judgment, balance and coordination.  Therefore,   for 24 hours, you have the following restrictions:   - DO NOT drive a car, operate machinery, make legal/financial decisions,   sign important papers or drink alcohol.    ACTIVITY:  Today: no heavy lifting, straining or running due to procedural   sedation/anesthesia.  The following day: return to full activity including work.  DIET:  Eat and drink normally unless instructed otherwise.     TREATMENT FOR COMMON SIDE EFFECTS:  - Mild abdominal pain, nausea, belching, bloating or excessive gas:  rest,   eat lightly and use a heating pad.  - Sore Throat: treat with throat lozenges and/or gargle with warm salt   water.  - Because air was used during the procedure, expelling large amounts of air   from your rectum or belching is normal.  - If a bowel prep was taken, you may not have a bowel movement for 1-3 days.    This is normal.  SYMPTOMS TO WATCH FOR AND REPORT TO YOUR PHYSICIAN:  1. Abdominal pain or bloating, other than gas cramps.  2. Chest pain.  3. Back pain.  4. Signs of infection such as: chills or fever occurring within 24 hours   after the procedure.  5. Rectal bleeding, which would show as bright red, maroon, or black stools.   (A tablespoon of blood from the rectum is not serious,  especially if   hemorrhoids are present.)  6. Vomiting.  7. Weakness or dizziness.  GO DIRECTLY TO THE NEAREST EMERGENCY ROOM IF YOU HAVE ANY OF THE FOLLOWING:      Difficulty breathing              Chills and/or fever over 101 F   Persistent vomiting and/or vomiting blood   Severe abdominal pain   Severe chest pain   Black, tarry stools   Bleeding- more than one tablespoon   Any other symptom or condition that you feel may need urgent attention  Your doctor recommends these additional instructions:  If any biopsies were taken, your doctors clinic will contact you in 1 to 2   weeks with any results.  - Discharge patient to home.   - Resume previous diet.   - Continue present medications.   - Await pathology results.   - Repeat colonoscopy in 2 years for surveillance.   - Return to referring physician.  For questions, problems or results please call your physician - Erik Stout MD at Work:  [phone].  [facility] EMERGENCY ROOM PHONE NUMBER: [phone]  IF A COMPLICATION OR EMERGENCY SITUATION ARISES AND YOU ARE UNABLE TO REACH   YOUR PHYSICIAN - GO DIRECTLY TO THE EMERGENCY ROOM.  MD Erik Mcdonald MD  9/13/2023 12:06:08 PM  This report has been verified and signed electronically.  Dear patient,  As a result of recent federal legislation (The Federal Cures Act), you may   receive lab or pathology results from your procedure in your [facility]   account before your physician is able to contact you. Your physician or   their representative will relay the results to you with their   recommendations at their soonest availability.  Thank you,  PROVATION

## 2023-09-15 ENCOUNTER — HOSPITAL ENCOUNTER (OUTPATIENT)
Dept: WOUND CARE | Facility: HOSPITAL | Age: 53
Discharge: HOME | End: 2023-09-15
Attending: PODIATRIST
Payer: MEDICARE

## 2023-09-15 VITALS — DIASTOLIC BLOOD PRESSURE: 81 MMHG | SYSTOLIC BLOOD PRESSURE: 151 MMHG | HEART RATE: 84 BPM | TEMPERATURE: 98 F

## 2023-09-15 DIAGNOSIS — L97.426 DIABETIC ULCER OF LEFT MIDFOOT ASSOCIATED WITH TYPE 2 DIABETES MELLITUS, WITH BONE INVOLVEMENT WITHOUT EVIDENCE OF NECROSIS: Primary | ICD-10-CM

## 2023-09-15 DIAGNOSIS — Z89.422 HX OF AMPUTATION OF LESSER TOE, LEFT: ICD-10-CM

## 2023-09-15 DIAGNOSIS — Z89.421 H/O AMPUTATION OF LESSER TOE, RIGHT: ICD-10-CM

## 2023-09-15 DIAGNOSIS — Z89.411 RIGHT GREAT TOE AMPUTEE: ICD-10-CM

## 2023-09-15 DIAGNOSIS — E11.621 DIABETIC ULCER OF LEFT MIDFOOT ASSOCIATED WITH TYPE 2 DIABETES MELLITUS, WITH BONE INVOLVEMENT WITHOUT EVIDENCE OF NECROSIS: Primary | ICD-10-CM

## 2023-09-15 DIAGNOSIS — L84 PRE-ULCERATIVE CALLUSES: ICD-10-CM

## 2023-09-15 DIAGNOSIS — E11.49 TYPE II DIABETES MELLITUS WITH NEUROLOGICAL MANIFESTATIONS: ICD-10-CM

## 2023-09-15 DIAGNOSIS — M21.072 EVERSION DEFORMITY OF FOOT, LEFT: ICD-10-CM

## 2023-09-15 PROCEDURE — 99214 PR OFFICE/OUTPT VISIT, EST, LEVL IV, 30-39 MIN: ICD-10-PCS | Mod: ,,, | Performed by: PODIATRIST

## 2023-09-15 PROCEDURE — 11042 DBRDMT SUBQ TIS 1ST 20SQCM/<: CPT | Performed by: PODIATRIST

## 2023-09-15 PROCEDURE — 99214 OFFICE O/P EST MOD 30 MIN: CPT | Mod: ,,, | Performed by: PODIATRIST

## 2023-09-15 NOTE — PROGRESS NOTES
[facility]                Progress Note    Subjective:       Patient ID: Indio Ryder Jr. is a 53 y.o. male.    Chief Complaint: Wound Check, Dressing Change, and Diabetic Foot Ulcer    Follow up wound care visit. Patient ambulated to exam room using knee scooter for LLE with Darco on Left Foot and Right Foot in tennis shoe with lift. Dressing is intact. No c/o pain to wound beds at present. Denies fever, chills, nausea, vomiting or diarrhea. Infectious disease d/c'd PICC line and started oral Augmentin, patient reports taking as prescribed.     Wound Check      Review of Systems   Constitutional:  Negative for appetite change, chills, fatigue and fever.   HENT:  Negative for hearing loss.    Eyes:  Negative for photophobia and visual disturbance.   Respiratory:  Negative for cough, chest tightness, shortness of breath and wheezing.    Cardiovascular:  Positive for leg swelling. Negative for chest pain and palpitations.   Gastrointestinal:  Negative for constipation, diarrhea, nausea and vomiting.   Endocrine: Negative for cold intolerance and heat intolerance.   Genitourinary:  Negative for flank pain.   Musculoskeletal:  Positive for gait problem and myalgias. Negative for neck pain and neck stiffness.   Skin:  Positive for wound. Negative for color change.   Neurological:  Positive for numbness. Negative for weakness, light-headedness and headaches.        + paresthesia    Psychiatric/Behavioral:  Negative for sleep disturbance.          Objective:        Physical Exam  Constitutional:       Appearance: He is well-developed.   Cardiovascular:      Comments: Dorsalis pedis and posterior tibial pulses are palpable Skin is atrophic, slightly hyperpigmented, and mildly edematous      Musculoskeletal:         General: No tenderness. Normal range of motion.      Comments: Muscle strength is 5/5 in all groups bilaterally.    S/p partial 5th ray amp b/l    S/p hallux  amp right    Fat pad atrophy to heels and met heads bilateral       Skin:     General: Skin is warm and dry.      Coloration: Skin is not jaundiced, mottled or sallow.      Findings: Wound (see below) present. No abscess or ecchymosis.      Comments:        Neurological:      Mental Status: He is alert and oriented to person, place, and time.      Comments: [place] 5.07 monofilamant testing is diminished Kenji feet. Sharp/dull sensation diminished Bilaterally. Light touch absent Bilaterally.       Psychiatric:         Behavior: Behavior normal.         Vitals:    09/15/23 1037   BP: (!) 151/81   Pulse: 84   Temp: 97.9 °F (36.6 °C)       Assessment:           ICD-10-CM ICD-9-CM   1. Diabetic ulcer of left midfoot associated with type 2 diabetes mellitus, with bone involvement without evidence of necrosis  E11.621 250.80    L97.426 707.14   2. Pre-ulcerative calluses  L84 700   3. Type II diabetes mellitus with neurological manifestations  E11.49 250.60   4. H/O amputation of lesser toe, right  Z89.421 V49.72   5. Right great toe amputee  Z89.411 V49.71   6. Hx of amputation of lesser toe, left  Z89.422 V49.72   7. Eversion deformity of foot, left  M21.072 736.79            (Retired) Wound 04/08/22 1137 Diabetic Ulcer Left plantar;lateral Foot (Active)   04/08/22 1137    Pre-existing: Yes   Primary Wound Type: Diabetic ulc   Side: Left   Orientation: plantar;lateral   Location: Foot   Wound Number:    Ankle-Brachial Index:    Pulses:    Removal Indication and Assessment:    Wound Outcome:    (Retired) Wound Type:    (Retired) Wound Length (cm):    (Retired) Wound Width (cm):    (Retired) Depth (cm):    Wound Description (Comments):    Removal Indications:    Wound Image    09/15/23 1522   Wound WDL ex 09/15/23 1522   Dressing Appearance Intact;Moist drainage 09/15/23 1522   Drainage Amount Moderate 09/15/23 1522   Drainage Characteristics/Odor Serosanguineous 09/15/23 1522   Appearance Red;Moist 09/15/23 1522    Tissue loss description Partial thickness 09/15/23 1522   Black (%), Wound Tissue Color 0 % 09/15/23 1522   Red (%), Wound Tissue Color 100 % 09/15/23 1522   Yellow (%), Wound Tissue Color 0 % 09/15/23 1522   Periwound Area Pale white 09/15/23 1522   Wound Edges Defined;Open 09/15/23 1522   Wound Length (cm) 2.8 cm 09/15/23 1522   Wound Width (cm) 0.5 cm 09/15/23 1522   Wound Depth (cm) 0.1 cm 09/15/23 1522   Wound Volume (cm^3) 0.14 cm^3 09/15/23 1522   Wound Surface Area (cm^2) 1.4 cm^2 09/15/23 1522   Undermining (depth (cm)/location) 0 09/15/23 1522   Care Cleansed with:;Antimicrobial agent;Sterile normal saline;Wound cleanser 09/15/23 1522   Dressing Changed 09/15/23 1522   Periwound Care Moisture barrier applied 09/15/23 1522   Compression Two layer compression 09/15/23 1522   Off Loading Football dressing;Off loading shoe 09/15/23 1522   Dressing Change Due 09/22/23 09/15/23 1522            Incision/Site 07/31/23 1405 Left Foot dorsal (Active)   07/31/23 1405   Present Prior to Hospital Arrival?:    Side: Left   Location: Foot   Orientation: dorsal   Incision Type:    Closure Method:    Additional Comments:    Removal Indication and Assessment:    Wound Outcome:    Removal Indications:    Wound Image    09/15/23 1522   Incision WDL ex 09/15/23 1522   Dressing Appearance Intact;Moist drainage 09/15/23 1522   Drainage Amount Small 09/15/23 1522   Drainage Characteristics/Odor Serosanguineous 09/15/23 1522   Appearance Sutures intact 09/15/23 1522   Black (%), Wound Tissue Color 0 % 09/15/23 1522   Red (%), Wound Tissue Color 100 % 09/15/23 1522   Yellow (%), Wound Tissue Color 0 % 09/15/23 1522   Periwound Area Dry;Intact 09/15/23 1522   Wound Edges Defined;Open 09/15/23 1522   Wound Length (cm) 1 cm 09/15/23 1522   Wound Width (cm) 0.1 cm 09/15/23 1522   Wound Depth (cm) 0.2 cm 09/15/23 1522   Wound Volume (cm^3) 0.02 cm^3 09/15/23 1522   Wound Surface Area (cm^2) 0.1 cm^2 09/15/23 1522   Tunneling (depth  (cm)/location) 0 09/15/23 1522   Undermining (depth (cm)/location) 0 09/15/23 1522   Care Cleansed with:;Antimicrobial agent;Sterile normal saline;Wound cleanser;Sutures removed 09/15/23 1522   Dressing Changed 09/15/23 1522   Periwound Care Moisture barrier applied 09/15/23 1522   Compression Two layer compression 09/15/23 1522   Off Loading Football dressing;Off loading shoe 09/15/23 1522   Dressing Change Due 09/22/23 09/15/23 1522             Plan:              Education about the prevention of limb loss.    Discussed wound healing cycle, skin integrity, ways to care for skin.Counseled patient on the effects of biomechanical pressure,  PVD, and blood glucose on healing. He verbalizes understanding that it can increase the chances of delayed healing and this prolonged exposure leads to infection or progression of infection which subsequently can result in loss of limb.    Adequate vitamin supplementation, protein intake, and hydration - discussed with patient    The wound is cleansed of foreign material as much as possible and the base inspected for bone or abscess.      Removed sutures from Dorsal aspect of Left Foot, still has small opening noted. Left Plantar Foot wound is measuring smaller. Rx shoes and [place] brace to the LLE for lack of PB insertion     He will continue to be primarily nonweightbearing to the surgical limb with scooter assisted ambulation.    Return to clinic in 1 week.    Short-term goals include maintaining good offloading and minimizing bioburden, promoting granulation and epithelialization to healing.  Long-term goals include keeping the wound healed by good offloading and medical management under the direction of internist.      Procedures      Tissue pathology and/or culture taken     [] Yes      [x] No  Sharp debridement performed                   [] Yes       [x] No  Labs ordered     [] Yes       [x] No  Imaging ordered    [] Yes      [x] No    Orders Placed This Encounter    Procedures    ORTHOTIC DEVICE (DME)     Order Specific Question:   Type of Orthotic to be filled?     Answer:   Solid ankle AFO     Comments:   [place] brace to LLE     Order Specific Question:   Height:     Answer:   6ft 1in     Order Specific Question:   Weight:     Answer:   220 lbs    DIABETIC SHOES FOR HOME USE     Primary and Secondary Diagnosis are necessary for patients to qualify for footwear, and documentation of the above diagnoses must be substantiated in the medical record.    I certify that I am the primary physician treating this patient for diabetes mellitus.  This order for diabetic footwear is medically necessary to protect their diabetic feet from breakdown.    I certify that this patient is under my direct care in a comprehensive plan for treatment of Diabetes Mellitus.     Order Specific Question:   Height:     Answer:   6ft 1in     Order Specific Question:   Weight:     Answer:   220 lbs     Order Specific Question:   Start date:     Answer:   9/15/2023     Order Specific Question:   Length of need (1-99 months):     Answer:   99     Order Specific Question:   Prognosis:     Answer:   Fair     Order Specific Question:   Type of diabetic shoes:     Answer:   Depth Shoe ()     Comments:   high top     Order Specific Question:   Type of inserts:     Answer:   Custom Inserts ()     Order Specific Question:   Insert Qty:     Answer:   2     Order Specific Question:   Primary diagnosis condition:     Answer:   Diabetes Mellitus Type II with neuro- E11.40     Order Specific Question:   Secondary Diagnosis Qualifying conditions:     Answer:   Foot Deformity     Order Specific Question:   Secondary Diagnosis Qualifying conditions:     Answer:   Complete or partial amputation     Order Specific Question:   Complete or partial amputation:     Answer:   Toes (Z89.4)     Order Specific Question:   Foot deformity:     Answer:   Toes (M20.59)    Change dressing     Wound Dressing Orders  "    Dressing change frequency once a week   Remove old dressing   Cleanse or irrigate with: Normal Saline   MD removed sutures from Dorsal Foot  Protect periwound with: Gentian Violet hugging plantar wound bed    Primary dressing: WOUND BASE: Cellerate to both wounds beds, then Mepilex Ag (with slit to allow for drainage to plantar foot)     Secondary dressing: Mextra 5" x 9", Abram foam long x 2   Off-load/Compression: Football dressing (Cast Padding x 2 rolls); Calamine Coflex. Darco shoe. Knee scooter for LLE to off-load     Return to clinic in 1 week.          Follow up in about 1 week (around 9/22/2023).       "

## 2023-09-15 NOTE — PATIENT INSTRUCTIONS
Lotions:  Eucerin  Eucerin for Diabetics  Aquaphor  A&D Ointment  Gold Bond for Diabetics  Sween  Rodrigo's Bees All Purpose Baby Ointment  Urea 40 with Aloe ([email or web address])    OTC Pain Creams:  Voltaren Gel  Biofreeze  Bengay  Macon Harrison  Two Old Goat  Lidocaine Gel  Absorbine Veterinary Liniment Gel Topical Analgesic Sore Muscle and Joint Pain Relief    Shoes:  Oasics GT 2000 or gel foundations  New Balance- stability type shoes (940 series)  Saucony Stabil C3  Falcon GTS, Beast, Transcend  Hoka One Bondi7    Brands:  Icarus (ankle support shoes)  Chance Champion&Jeffy- for men  Dwale  Crocs  Aerosoles  Naturalizers  SAS  Ecco  Born  José Antonio Wang  [facility]- dress shoes  Vionic  Birkenstocks  Fitflops  Navneet

## 2023-09-18 LAB
ACID FAST MOD KINY STN SPEC: NORMAL
FINAL PATHOLOGIC DIAGNOSIS: NORMAL
GROSS: NORMAL
Lab: NORMAL
MICROSCOPIC EXAM: NORMAL
MYCOBACTERIUM SPEC QL CULT: NORMAL

## 2023-09-18 NOTE — PROGRESS NOTES
Infectious Disease Clinic Note  09/11/23      Subjective:       Patient ID: Indio Ryder Jr. is a 53 y.o. male being seen for a followup visit.    Chief Complaint: Follow-up    HPI     Mr. Ryder is a 53 y/o M with hx DM and numerous episodes of foot osteo presents for f/up of Lt foot osteomyelitis.    Interval hx:  Last seen on 3/2/23. Completed 6 wks cipro (3/12) and 4 wks augmentin (3/8) for pseudomonas (bone and wound cx) and e faecalis (wound cx). Reports increased drainage and size of wound despite continued adherence to treatment plan concerning for infection. Images under media tab reviewed. Wound cx 5/12 grew enterobacter cloacae and e faecalis.    Hx:   Pt has been following with podiatry for a non healing Lt foot ulcer that per pt, suddenly worsened. MRI  1/19 revealed: Extensive marrow edema involving the 4th TMT joint extending to the 2nd and 3rd TMT joints contiguous with the overlying soft tissue ulceration, concerning for osteomyelitis.  Underwent bone biopsy of left trochar on 1/20. Bone culture from 1/20 grew pseudomonas. Bone path revealed OM.  Discussed results with patient and started on cipro x 6 wks on 1/29. Subsequent wound culture from 1/27 growing pseudomonas plus e faecalis (R to tetracycline/ S to amp).       Has been undergoing hyperbaric therapy since 12/2022. Had normal ALISSA 11/18/22. Was treated x 4 wks w augmenting for e faecalis positive wound cx and 6 wks with cipro  (last day 3/12) for pseudomonas OM (bone and wound cx +). Underwent graft placement on 2/24 by Dr. Peterson. Had 2 weeks of cipro/amoxacillin 5/16 for wound infection.     7/17/23:  Referred by POD as increased wound drainge and MRI with stable bone changes at TMT area cf osteo.  Reportedly is to have surgy with Dr. Peterson in near future   Bone cultures 7/14 with CONS - on cipro per patient from POD.  Bone cx with CONS (though may be polymicrobial per [facility] micro lab- being worked up.  Stable.    8/14/23:  Bone cultures  at [facility] showed E faecalis, Kocuria Kristinae, and corynebacterium.  Pathology showed acute osteo.  PICC placed and was started on daptomycin.  On 7/31 went for debridement and had bone excised.  Cultures showed E faecalis and Staph lugdenensis. He remains on Daptomycin.  Pathology from 7/31 did not show acute osteo.  Reports doing ok without significant foot pain.  PICC doing ok and denies abx side effects.  Daptomycin started 7/25.  The patient denies any recent fever, chills, or sweats.    8/28/23:  Doing well.  Foot healing.  Tolerating IV abx and denies PICC issues.  The patient denies any recent fever, chills, or sweats.    9/11/23:  Doing well and reports foot healing. Denies wounds probe to bone.  Tolerating abx and denies picc problems.  The patient denies any recent fever, chills, or sweats.       Family History   Adopted: Yes   Problem Relation Age of Onset    Hypertension Mother     Cancer Mother         breast    Diabetes Mother     Hypertension Father     Cataracts Father     Heart disease Father         mi    Diabetes Sister     No Known Problems Brother     Heart disease Sister         MI    No Known Problems Sister     Hypertension Maternal Aunt     No Known Problems Maternal Uncle     No Known Problems Paternal Aunt     No Known Problems Paternal Uncle     No Known Problems Maternal Grandmother     No Known Problems Maternal Grandfather     No Known Problems Paternal Grandmother     No Known Problems Paternal Grandfather     Amblyopia Neg Hx     Blindness Neg Hx     Glaucoma Neg Hx     Macular degeneration Neg Hx     Retinal detachment Neg Hx     Strabismus Neg Hx     Stroke Neg Hx     Thyroid disease Neg Hx      Social History     Socioeconomic History    Marital status: Single    Number of children: 0   Occupational History    Occupation: Retired     Employer: [employer]   Tobacco Use    Smoking status: Never    Smokeless tobacco: Never   Substance and Sexual Activity    Alcohol use: No    Drug  use: No    Sexual activity: Yes     Partners: Female     Birth control/protection: Condom     Social Determinants of Health     Financial Resource Strain: High Risk (7/13/2023)    Overall Financial Resource Strain (CARDIA)     Difficulty of Paying Living Expenses: Very hard   Food Insecurity: No Food Insecurity (7/13/2023)    Hunger Vital Sign     Worried About Running Out of Food in the Last Year: Never true     Ran Out of Food in the Last Year: Never true   Transportation Needs: No Transportation Needs (7/13/2023)    PRAPARE - Transportation     Lack of Transportation (Medical): No     Lack of Transportation (Non-Medical): No   Physical Activity: Inactive (7/13/2023)    Exercise Vital Sign     Days of Exercise per Week: 0 days     Minutes of Exercise per Session: 0 min   Stress: No Stress Concern Present (7/13/2023)    [employer] - Occupational Stress Questionnaire     Feeling of Stress : Not at all   Social Connections: Moderately Isolated (7/13/2023)    Social Connection and Isolation Panel [NHANES]     Frequency of Communication with Friends and Family: More than three times a week     Frequency of Social Gatherings with Friends and Family: More than three times a week     Attends Islam Services: More than 4 times per year     Active Member of Clubs or Organizations: No     Attends Club or Organization Meetings: Never     Marital Status:    Housing Stability: Low Risk  (7/13/2023)    Housing Stability Vital Sign     Unable to Pay for Housing in the Last Year: No     Number of Places Lived in the Last Year: 1     Unstable Housing in the Last Year: No     Past Surgical History:   Procedure Laterality Date    COLONOSCOPY Right 1/19/2022    Procedure: COLONOSCOPY;  Surgeon: Tj Haile MD;  Location: [address];  Service: Endoscopy;  Laterality: Right;  previous order un-usable/poor prep 1/18/22  RAPID COVID test arrival 12:20  instructions handed to pt- sm     COLONOSCOPY N/A 3/21/2022    Procedure: COLONOSCOPY;  Surgeon: Sheng Haque MD;  Location: [facility] ENDO (2ND FLR);  Service: Endoscopy;  Laterality: N/A;  Colonoscopy with AES for hepatix flexure polyp removal, 2nd floor  Pt is fully vaccinated-DS  Pt prescribed Plavix by Dr. Stahl in Jan. 2022, however pt states that he never started taking it-DS  3/16/22-Instructions sent via portal-DS    COLONOSCOPY N/A 9/13/2023    Procedure: COLONOSCOPY;  Surgeon: Erik Stout MD;  Location: [facility] ENDO (4TH FLR);  Service: Colon and Rectal;  Laterality: N/A;  Referred by Dr. Stout, suprilsa, Meds, portal -ml    DEBRIDEMENT OF FOOT Left 7/14/2019    Procedure: DEBRIDEMENT, FOOT, with left 5th ray amputation;  Surgeon: Sis Hickman DPM;  Location: [facility] OR 2ND FLR;  Service: Podiatry;  Laterality: Left;    DEBRIDEMENT OF FOOT Left 7/17/2019    Procedure: DEBRIDEMENT, FOOT with leftt 5th ray partial amputation with Neox Graft;  Surgeon: Mai Burrell DPM;  Location: [facility] OR 2ND FLR;  Service: Podiatry;  Laterality: Left;  t    DEBRIDEMENT OF FOOT Bilateral 4/29/2021    Procedure: DEBRIDEMENT, FOOT;  Surgeon: Mai Burrell DPM;  Location: [facility] OR 1ST FLR;  Service: Podiatry;  Laterality: Bilateral;  Graft application    DEBRIDEMENT OF FOOT Left 7/31/2023    Procedure: DEBRIDEMENT, FOOT;  Surgeon: Marivel Peterson DPM;  Location: [facility] OR 2ND FLR;  Service: Podiatry;  Laterality: Left;    TOE AMPUTATION Right 06/05/2015    TOE AMPUTATION Right 01/19/2017    XI ROBOTIC COLECTOMY, RIGHT N/A 4/25/2022    Procedure: XI ROBOTIC COLECTOMY, RIGHT (lithotomy, eras low);  Surgeon: Erik Stout MD;  Location: [facility] OR 2ND FLR;  Service: Colon and Rectal;  Laterality: N/A;  Paruch to Goodwin    XI ROBOTIC COLECTOMY, RIGHT  4/25/2022    Procedure: ;  Surgeon: Erik Stout MD;  Location: [facility] OR 2ND FLR;  Service: Colon and Rectal;;       Patient's Medications   New Prescriptions    No medications on  "file   Previous Medications    ATORVASTATIN (LIPITOR) 80 MG TABLET    Take 1 tablet (80 mg total) by mouth once daily.    EMPAGLIFLOZIN (JARDIANCE) 10 MG TABLET    Take 1 tablet (10 mg total) by mouth once daily.    HYDROCODONE-ACETAMINOPHEN (NORCO) 5-325 MG PER TABLET    Take 1 tablet by mouth every 6 (six) hours as needed for Pain.    INSULIN ASPART U-100 (NOVOLOG) 100 UNIT/ML (3 ML) INPN PEN    Inject 8 units before meals plus scale 150-200+2, 201-250+4, 251-300+6, 301-350+8, >350+10. Max daily 54 units.    INSULIN DETEMIR U-100 (LEVEMIR FLEXTOUCH) 100 UNIT/ML (3 ML) SUBQ INPN PEN    Inject 26 Units into the skin every evening.    ONETOUCH DELICA LANCETS 33 GAUGE [employer]        ONETOUCH ULTRA2 KIT        PEN NEEDLE, DIABETIC (BD SASCHA 2ND GEN PEN NEEDLE) 32 GAUGE X 5/32" NDLE    Use 4 times a day    SEMAGLUTIDE (OZEMPIC) 1 MG/DOSE (4 MG/3 ML)    Inject 1 mg into the skin every 7 days.   Modified Medications    Modified Medication Previous Medication    AMOXICILLIN-CLAVULANATE 875-125MG (AUGMENTIN) 875-125 MG PER TABLET amoxicillin-clavulanate 875-125mg (AUGMENTIN) 875-125 mg per tablet       Take 1 tablet by mouth 2 (two) times a day.    Take 1 tablet by mouth 2 (two) times daily. for 14 days   Discontinued Medications    DAPTOMYCIN (CUBICIN) 500 MG INJECTION    Inject into the vein.       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 edema, with " "long-term current use of insulin 03/23/2021    Chronic left shoulder pain 07/30/2020    Allergic reaction due to antibacterial drug 04/05/2020    Subacute osteomyelitis of left foot 03/11/2020    Hypertensive retinopathy, bilateral 01/28/2020    Diabetic ulcer of left midfoot associated with type 2 diabetes mellitus, with bone involvement without evidence of necrosis 08/02/2019    Severe malnutrition 07/22/2019    Diabetic ulcer of right midfoot associated with type 2 diabetes mellitus, with necrosis of bone 07/12/2019    Neck pain 11/08/2017    Hypokalemia 05/30/2017    Actinomyces infection 01/17/2017     Right foot      Type 2 diabetes mellitus with hyperglycemia, with long-term current use of insulin 05/03/2016    Mixed hyperlipidemia 08/12/2014    Essential hypertension 06/06/2013       Review of Systems   Review of Systems   Constitutional:  Negative for chills, fever and malaise/fatigue.   Respiratory:  Negative for cough and shortness of breath.    Cardiovascular:  Negative for chest pain and orthopnea.   Gastrointestinal:  Negative for abdominal pain, diarrhea and vomiting.   Genitourinary:  Negative for dysuria.   Musculoskeletal:  Negative for back pain and myalgias.        L foot wound   Neurological:  Negative for sensory change and weakness.           Objective:      /76 (BP Location: Right arm)   Pulse 102   Temp 98 °F (36.7 °C) (Oral)   Ht 6' 1" (1.854 m)   Wt 99.2 kg (218 lb 11.1 oz)   BMI 28.85 kg/m²   Estimated body mass index is 28.85 kg/m² as calculated from the following:    Height as of this encounter: 6' 1" (1.854 m).    Weight as of this encounter: 99.2 kg (218 lb 11.1 oz).    Physical Exam  Vitals and nursing note reviewed.   Constitutional:       General: He is not in acute distress.     Appearance: Normal appearance. He is not ill-appearing, toxic-appearing or diaphoretic.       HENT:      Head: Normocephalic and atraumatic.      Nose: Nose normal. No congestion.      " Mouth/Throat:      Mouth: Mucous membranes are moist.      Pharynx: Oropharynx is clear.   Eyes:      Conjunctiva/sclera: Conjunctivae normal.      Pupils: Pupils are equal, round, and reactive to light.   Cardiovascular:      Rate and Rhythm: Normal rate and regular rhythm.   Pulmonary:      Effort: Pulmonary effort is normal. No respiratory distress.      Breath sounds: Normal breath sounds.   Abdominal:      General: Abdomen is flat. There is no distension.      Palpations: Abdomen is soft.   Musculoskeletal:         General: No swelling or tenderness. Normal range of motion.      Cervical back: Normal range of motion and neck supple.      Comments: Clean dressings at Lt foot with orthotic shoe.    Skin:     General: Skin is warm and dry.   Neurological:      General: No focal deficit present.      Mental Status: He is alert and oriented to person, place, and time.   Psychiatric:         Mood and Affect: Mood normal.         Behavior: Behavior normal.             8/25/23 below x 2                  Photo 8/11 below            Labs:     Latest Reference Range & Units 09/05/23 14:11 09/11/23 11:17   WBC 3.90 - 12.70 K/uL 5.05 4.80   RBC 4.60 - 6.20 M/uL 4.58 (L) 4.48 (L)   Hemoglobin 14.0 - 18.0 g/dL 12.5 (L) 12.9 (L)   Hematocrit 40.0 - 54.0 % 40.9 40.6   MCV 82 - 98 fL 89 91   MCH 27.0 - 31.0 pg 27.3 28.8   MCHC 32.0 - 36.0 g/dL 30.6 (L) 31.8 (L)   RDW 11.5 - 14.5 % 14.8 (H) 14.8 (H)   Platelets 150 - 450 K/uL 259 208   MPV 9.2 - 12.9 fL 11.4 12.2   Gran % 38.0 - 73.0 % 50.1 55.8   Lymph % 18.0 - 48.0 % 39.2 33.8   Mono % 4.0 - 15.0 % 6.1 6.7   Eosinophil % 0.0 - 8.0 % 3.8 2.9   Basophil % 0.0 - 1.9 % 0.6 0.6   Immature Granulocytes 0.0 - 0.5 % 0.2 0.2   Gran # (ANC) 1.8 - 7.7 K/uL 2.5 2.7   Lymph # 1.0 - 4.8 K/uL 2.0 1.6   Mono # 0.3 - 1.0 K/uL 0.3 0.3   Eos # 0.0 - 0.5 K/uL 0.2 0.1   Baso # 0.00 - 0.20 K/uL 0.03 0.03   Immature Grans (Abs) 0.00 - 0.04 K/uL 0.01 0.01   nRBC 0 /100 WBC 0 0   Differential Method   Automated Automated   Sodium 136 - 145 mmol/L 140 139   Potassium 3.5 - 5.1 mmol/L 3.6 4.0   Chloride 95 - 110 mmol/L 103 108   CO2 23 - 29 mmol/L 24 20 (L)   Anion Gap 8 - 16 mmol/L 13 11   BUN 6 - 20 mg/dL 12 11   Creatinine 0.5 - 1.4 mg/dL 1.0 1.1   eGFR >60 mL/min/1.73 m^2 >60.0 >60.0   Glucose 70 - 110 mg/dL 148 (H) 195 (H)   Calcium 8.7 - 10.5 mg/dL 9.6 9.4   Alkaline Phosphatase 55 - 135 U/L 208 (H) 179 (H)   PROTEIN TOTAL 6.0 - 8.4 g/dL 7.7 7.7   Albumin 3.5 - 5.2 g/dL 3.5 3.4 (L)   BILIRUBIN TOTAL 0.1 - 1.0 mg/dL 0.6 0.6   AST 10 - 40 U/L 22 21   ALT 10 - 44 U/L 24 23   CRP 0.0 - 8.2 mg/L 1.4 1.4   (L): Data is abnormally low  (H): Data is abnormally high      Assessment:         1. Acute hematogenous osteomyelitis, unspecified site  amoxicillin-clavulanate 875-125mg (AUGMENTIN) 875-125 mg per tablet    CBC Auto Differential    Comprehensive Metabolic Panel    C-Reactive Protein    Procalcitonin    Nursing communication      2. Therapeutic drug monitoring  CBC Auto Differential    Comprehensive Metabolic Panel    C-Reactive Protein    Procalcitonin    Nursing communication          Plan:         Diabetic ulcer of left midfoot associated with type 2 diabetes mellitus, with fat layer exposed  53y/o M with hx of LLE DM foot ulcers c/b osteo s/p 6 wks abx (last day 3/12/23), now with increased drainage at nonealing ulcer concerning for infection. Wound cx growing pan-sen enterobacter cloacae and amp- S e faecalis.  Treated 5/16/23 with 2 weeks of po cipro and amoxicillin. Developed increased wound drainage.  MRI L foot with stable changes ar 2,3, and 4 TMT area - likely chonic osteo. Bone culture with E faecalis, corynebacterium, kocuria k - on dapto.  Pathology with acute osteo.  OR 7/31 bone cultures with E faecalis and Staph lugdenensis - Completed 6 weeks of daptomycin from last sx. Gram stain showed budding yeast but no growth on cultures - ? Contaminant. Path without acute osteo.    CRP WNL on labs.   Tolerating IV abx and no PICC issues.  Pics indicate foot healing well.  Bone not exposed per patient and not mentioned on last POD note.    Plan:   Stop Daptomycin to complete 6 weeks from last surgery - EOC 9/11/23   PICC removal  Continue on Augmentin 875mg po bid given multiple prior cultures with eneterococcus and suspect likely chronic osteo is still present.  Plan for a min but mohini 6 months.  Discussed with patient who agrees.  Rx sent in.  FU3-4 weeks      Total professional time spent for the encounter: 30 minutes  Time was spent preparing to see the patient, reviewing results of prior testing, obtaining and/or reviewing separately obtained history, performing a medically appropriate examination and interview, counseling and educating the patient/family, ordering medications/tests/procedures, referring and communicating with other health care professionals, documenting clinical information in the electronic health record, and independently interpreting results.

## 2023-09-22 ENCOUNTER — HOSPITAL ENCOUNTER (OUTPATIENT)
Dept: WOUND CARE | Facility: HOSPITAL | Age: 53
Discharge: HOME | End: 2023-09-22
Attending: PODIATRIST
Payer: MEDICARE

## 2023-09-22 VITALS — DIASTOLIC BLOOD PRESSURE: 79 MMHG | HEART RATE: 92 BPM | SYSTOLIC BLOOD PRESSURE: 167 MMHG | TEMPERATURE: 98 F

## 2023-09-22 DIAGNOSIS — E11.621 DIABETIC ULCER OF LEFT MIDFOOT ASSOCIATED WITH TYPE 2 DIABETES MELLITUS, WITH BONE INVOLVEMENT WITHOUT EVIDENCE OF NECROSIS: Primary | ICD-10-CM

## 2023-09-22 DIAGNOSIS — L97.426 DIABETIC ULCER OF LEFT MIDFOOT ASSOCIATED WITH TYPE 2 DIABETES MELLITUS, WITH BONE INVOLVEMENT WITHOUT EVIDENCE OF NECROSIS: Primary | ICD-10-CM

## 2023-09-22 DIAGNOSIS — L84 PRE-ULCERATIVE CALLUSES: ICD-10-CM

## 2023-09-22 DIAGNOSIS — E11.49 TYPE II DIABETES MELLITUS WITH NEUROLOGICAL MANIFESTATIONS: ICD-10-CM

## 2023-09-22 PROCEDURE — 99214 PR OFFICE/OUTPT VISIT, EST, LEVL IV, 30-39 MIN: ICD-10-PCS | Mod: ,,, | Performed by: PODIATRIST

## 2023-09-22 PROCEDURE — 11042 DBRDMT SUBQ TIS 1ST 20SQCM/<: CPT | Performed by: PODIATRIST

## 2023-09-22 PROCEDURE — 99214 OFFICE O/P EST MOD 30 MIN: CPT | Mod: ,,, | Performed by: PODIATRIST

## 2023-09-22 NOTE — PROGRESS NOTES
[facility]                Progress Note    Subjective:       Patient ID: Indio Ryder Jr. is a 53 y.o. male.    Chief Complaint: Wound Check, Diabetic Foot Ulcer, and Dressing Change    Follow up wound care visit. Patient ambulated to exam room using knee scooter for LLE with Darco on Left Foot and Right Foot in tennis shoe with lift. Dressing is intact. No c/o pain to wound beds at present. Denies fever, chills, nausea, vomiting or diarrhea. He continues to take oral Augmentin as prescribed.    Wound Check      Review of Systems   Constitutional:  Negative for appetite change, chills, fatigue and fever.   HENT:  Negative for hearing loss.    Eyes:  Negative for photophobia and visual disturbance.   Respiratory:  Negative for cough, chest tightness, shortness of breath and wheezing.    Cardiovascular:  Positive for leg swelling. Negative for chest pain and palpitations.   Gastrointestinal:  Negative for constipation, diarrhea, nausea and vomiting.   Endocrine: Negative for cold intolerance and heat intolerance.   Genitourinary:  Negative for flank pain.   Musculoskeletal:  Positive for gait problem and myalgias. Negative for neck pain and neck stiffness.   Skin:  Positive for wound. Negative for color change.   Neurological:  Positive for numbness. Negative for weakness, light-headedness and headaches.        + paresthesia    Psychiatric/Behavioral:  Negative for sleep disturbance.          Objective:        Physical Exam  Constitutional:       Appearance: He is well-developed.   Cardiovascular:      Comments: Dorsalis pedis and posterior tibial pulses are palpable Skin is atrophic, slightly hyperpigmented, and mildly edematous      Musculoskeletal:         General: No tenderness. Normal range of motion.      Comments: Muscle strength is 5/5 in all groups bilaterally.    S/p partial 5th ray amp b/l    S/p hallux amp right    Fat pad atrophy to heels and met heads  bilateral       Skin:     General: Skin is warm and dry.      Coloration: Skin is not jaundiced, mottled or sallow.      Findings: Wound (see below) present. No abscess or ecchymosis.      Comments:        Neurological:      Mental Status: He is alert and oriented to person, place, and time.      Comments: Colby-Nenita 5.07 monofilamant testing is diminished Kenji feet. Sharp/dull sensation diminished Bilaterally. Light touch absent Bilaterally.       Psychiatric:         Behavior: Behavior normal.         Vitals:    09/22/23 1014   BP: (!) 167/79   Pulse: 92   Temp: 98 °F (36.7 °C)       Assessment:           ICD-10-CM ICD-9-CM   1. Diabetic ulcer of left midfoot associated with type 2 diabetes mellitus, with bone involvement without evidence of necrosis  E11.621 250.80    L97.426 707.14   2. Pre-ulcerative calluses  L84 700   3. Type II diabetes mellitus with neurological manifestations  E11.49 250.60            (Retired) Wound 04/08/22 1137 Diabetic Ulcer Left plantar;lateral Foot (Active)   04/08/22 1137    Pre-existing: Yes   Primary Wound Type: Diabetic ulc   Side: Left   Orientation: plantar;lateral   Location: Foot   Wound Number:    Ankle-Brachial Index:    Pulses:    Removal Indication and Assessment:    Wound Outcome:    (Retired) Wound Type:    (Retired) Wound Length (cm):    (Retired) Wound Width (cm):    (Retired) Depth (cm):    Wound Description (Comments):    Removal Indications:    Wound Image   09/22/23 1041   Dressing Appearance Intact;Moist drainage 09/22/23 1041   Drainage Amount Moderate 09/22/23 1041   Drainage Characteristics/Odor Serosanguineous;No odor 09/22/23 1041   Appearance Red;Moist 09/22/23 1041   Tissue loss description Partial thickness 09/22/23 1041   Black (%), Wound Tissue Color 0 % 09/22/23 1041   Red (%), Wound Tissue Color 100 % 09/22/23 1041   Yellow (%), Wound Tissue Color 0 % 09/22/23 1041   Periwound Area Pale white 09/22/23 1041   Wound Edges Defined;Open 09/22/23 1041    Wound Length (cm) 1 cm 09/22/23 1041   Wound Width (cm) 0.3 cm 09/22/23 1041   Wound Depth (cm) 0.1 cm 09/22/23 1041   Wound Volume (cm^3) 0.03 cm^3 09/22/23 1041   Wound Surface Area (cm^2) 0.3 cm^2 09/22/23 1041   Tunneling (depth (cm)/location) 0 09/22/23 1041   Undermining (depth (cm)/location) 0 09/22/23 1041   Care Cleansed with:;Antimicrobial agent;Sterile normal saline;Wound cleanser 09/22/23 1041   Dressing Changed 09/22/23 1041   Periwound Care Moisture barrier applied;Absorptive dressing applied 09/22/23 1041   Compression Two layer compression 09/22/23 1041   Off Loading Football dressing;Off loading shoe 09/22/23 1041   Dressing Change Due 09/29/23 09/22/23 1041            Incision/Site 07/31/23 1405 Left Foot dorsal (Active)   07/31/23 1405   Present Prior to Hospital Arrival?:    Side: Left   Location: Foot   Orientation: dorsal   Incision Type:    Closure Method:    Additional Comments:    Removal Indication and Assessment:    Wound Outcome:    Removal Indications:    Wound Image   09/22/23 1041   Incision WDL ex 09/22/23 1041   Dressing Appearance Intact;Dried drainage 09/22/23 1041   Drainage Amount Scant 09/22/23 1041   Drainage Characteristics/Odor Serosanguineous 09/22/23 1041   Appearance Red 09/22/23 1041   Black (%), Wound Tissue Color 0 % 09/22/23 1041   Red (%), Wound Tissue Color 100 % 09/22/23 1041   Yellow (%), Wound Tissue Color 0 % 09/22/23 1041   Periwound Area Dry 09/22/23 1041   Wound Edges Other (see comments) 09/22/23 1041   Wound Length (cm) 0.1 cm 09/22/23 1041   Wound Width (cm) 0.4 cm 09/22/23 1041   Wound Depth (cm) 0.1 cm 09/22/23 1041   Wound Volume (cm^3) 0.004 cm^3 09/22/23 1041   Wound Surface Area (cm^2) 0.04 cm^2 09/22/23 1041   Tunneling (depth (cm)/location) 0 09/22/23 1041   Undermining (depth (cm)/location) 0 09/22/23 1041   Care Cleansed with:;Antimicrobial agent;Sterile normal saline;Wound cleanser 09/22/23 1041   Dressing Changed 09/22/23 1041   Periwound Care  "Absorptive dressing applied 09/22/23 1041   Compression Two layer compression 09/22/23 1041   Off Loading Football dressing;Off loading shoe 09/22/23 1041   Dressing Change Due 09/29/23 09/22/23 1041             Plan:              Education about the prevention of limb loss.    Discussed wound healing cycle, skin integrity, ways to care for skin.Counseled patient on the effects of biomechanical pressure,  PVD, and blood glucose on healing. He verbalizes understanding that it can increase the chances of delayed healing and this prolonged exposure leads to infection or progression of infection which subsequently can result in loss of limb.    The wound is cleansed of foreign material as much as possible and the base inspected for bone or abscess.      Dorsal Left Foot wound is measuring smaller overall. Left Plantar Foot wound is measuring smaller. Patient reports going to diabetic shoe stores to look at ankle supporting diabetic shoes.    He will continue to be primarily nonweightbearing to the surgical limb with scooter assisted ambulation.    Return to clinic in 1 week.    Short-term goals include maintaining good offloading and minimizing bioburden, promoting granulation and epithelialization to healing.  Long-term goals include keeping the wound healed by good offloading and medical management under the direction of internist.      Procedures    Orders Placed This Encounter   Procedures    Change dressing     Wound Dressing Orders     Dressing change frequency once a week   Remove old dressing   Cleanse or irrigate with: Normal Saline   MD removed sutures from Dorsal Foot   Protect periwound with: Gentian Violet hugging plantar wound bed    Primary dressing: WOUND BASE: Cellerate to Plantar wound bed then Mepilex Ag (with slit to allow for drainage); Endoform/Iodoflex (confetti mix) to Dorsal wound bed  Secondary dressing: Mextra 5" x 9", Abram foam long x 2   Off-load/Compression: Football dressing (Cast " Padding x 2 rolls); Calamine Coflex (pink layer to skin then football). Darco shoe. Knee scooter for LLE to off-load     Return to clinic in 1 week.          Follow up in about 1 week (around 9/29/2023).

## 2023-09-29 ENCOUNTER — HOSPITAL ENCOUNTER (OUTPATIENT)
Dept: WOUND CARE | Facility: HOSPITAL | Age: 53
Discharge: HOME | End: 2023-09-29
Attending: PODIATRIST
Payer: MEDICARE

## 2023-09-29 VITALS — DIASTOLIC BLOOD PRESSURE: 93 MMHG | SYSTOLIC BLOOD PRESSURE: 160 MMHG | HEART RATE: 82 BPM | TEMPERATURE: 99 F

## 2023-09-29 DIAGNOSIS — E11.49 TYPE II DIABETES MELLITUS WITH NEUROLOGICAL MANIFESTATIONS: ICD-10-CM

## 2023-09-29 DIAGNOSIS — E11.621 DIABETIC ULCER OF LEFT MIDFOOT ASSOCIATED WITH TYPE 2 DIABETES MELLITUS, WITH BONE INVOLVEMENT WITHOUT EVIDENCE OF NECROSIS: Primary | ICD-10-CM

## 2023-09-29 DIAGNOSIS — L97.426 DIABETIC ULCER OF LEFT MIDFOOT ASSOCIATED WITH TYPE 2 DIABETES MELLITUS, WITH BONE INVOLVEMENT WITHOUT EVIDENCE OF NECROSIS: Primary | ICD-10-CM

## 2023-09-29 PROCEDURE — 99499 UNLISTED E&M SERVICE: CPT | Mod: ,,, | Performed by: PODIATRIST

## 2023-09-29 PROCEDURE — 11042 DBRDMT SUBQ TIS 1ST 20SQCM/<: CPT | Performed by: PODIATRIST

## 2023-09-29 PROCEDURE — 11042 WOUND DEBRIDEMENT: ICD-10-PCS | Mod: ,,, | Performed by: PODIATRIST

## 2023-09-29 PROCEDURE — 11042 DBRDMT SUBQ TIS 1ST 20SQCM/<: CPT | Mod: ,,, | Performed by: PODIATRIST

## 2023-09-29 PROCEDURE — 99499 NO LOS: ICD-10-PCS | Mod: ,,, | Performed by: PODIATRIST

## 2023-09-29 NOTE — PROGRESS NOTES
[facility]                Progress Note    Subjective:       Patient ID: Indio Ryder Jr. is a 53 y.o. male.    Chief Complaint: Wound Check, Dressing Change, and Diabetic Foot Ulcer    Follow up wound care visit. Patient ambulated to exam room using knee scooter for LLE with Darco on Left Foot and Right Foot in tennis shoe with lift. Dressing is intact. No c/o pain to wound beds at present. Denies fever, chills, nausea, vomiting or diarrhea. He continues to take oral Augmentin as prescribed    Wound Check      Review of Systems   Constitutional:  Negative for appetite change, chills, fatigue and fever.   HENT:  Negative for hearing loss.    Eyes:  Negative for photophobia and visual disturbance.   Respiratory:  Negative for cough, chest tightness, shortness of breath and wheezing.    Cardiovascular:  Positive for leg swelling. Negative for chest pain and palpitations.   Gastrointestinal:  Negative for constipation, diarrhea, nausea and vomiting.   Endocrine: Negative for cold intolerance and heat intolerance.   Genitourinary:  Negative for flank pain.   Musculoskeletal:  Positive for gait problem and myalgias. Negative for neck pain and neck stiffness.   Skin:  Positive for wound. Negative for color change.   Neurological:  Positive for numbness. Negative for weakness, light-headedness and headaches.        + paresthesia    Psychiatric/Behavioral:  Negative for sleep disturbance.          Objective:        Physical Exam  Constitutional:       Appearance: He is well-developed.   Cardiovascular:      Comments: Dorsalis pedis and posterior tibial pulses are palpable Skin is atrophic, slightly hyperpigmented, and mildly edematous      Musculoskeletal:         General: No tenderness. Normal range of motion.      Comments: Muscle strength is 5/5 in all groups bilaterally.    S/p partial 5th ray amp b/l    S/p hallux amp right    Fat pad atrophy to heels and met heads  bilateral       Skin:     General: Skin is warm and dry.      Coloration: Skin is not jaundiced, mottled or sallow.      Findings: Wound (see below) present. No abscess or ecchymosis.      Comments:        Neurological:      Mental Status: He is alert and oriented to person, place, and time.      Comments: Index-Nenita 5.07 monofilamant testing is diminished Kenji feet. Sharp/dull sensation diminished Bilaterally. Light touch absent Bilaterally.       Psychiatric:         Behavior: Behavior normal.         Vitals:    09/29/23 1000   BP: (!) 160/93   Pulse: 82   Temp: 98.6 °F (37 °C)       Assessment:           ICD-10-CM ICD-9-CM   1. Diabetic ulcer of left midfoot associated with type 2 diabetes mellitus, with bone involvement without evidence of necrosis  E11.621 250.80    L97.426 707.14   2. Type II diabetes mellitus with neurological manifestations  E11.49 250.60            (Retired) Wound 04/08/22 1137 Diabetic Ulcer Left plantar;lateral Foot (Active)   04/08/22 1137    Pre-existing: Yes   Primary Wound Type: Diabetic ulc   Side: Left   Orientation: plantar;lateral   Location: Foot   Wound Number:    Ankle-Brachial Index:    Pulses:    Removal Indication and Assessment:    Wound Outcome:    (Retired) Wound Type:    (Retired) Wound Length (cm):    (Retired) Wound Width (cm):    (Retired) Depth (cm):    Wound Description (Comments):    Removal Indications:    Wound Image   09/29/23 1356   Wound WDL ex 09/29/23 1356   Dressing Appearance Intact;Moist drainage 09/29/23 1356   Drainage Amount Small 09/29/23 1356   Drainage Characteristics/Odor Serosanguineous 09/29/23 1356   Appearance Red;Moist 09/29/23 1356   Tissue loss description Full thickness 09/29/23 1356   Black (%), Wound Tissue Color 0 % 09/29/23 1356   Red (%), Wound Tissue Color 100 % 09/29/23 1356   Yellow (%), Wound Tissue Color 0 % 09/29/23 1356   Periwound Area Pale white;Dry 09/29/23 1356   Wound Edges Defined;Open 09/29/23 1356   Wound Length  (cm) 1 cm 09/29/23 1356   Wound Width (cm) 0.2 cm 09/29/23 1356   Wound Depth (cm) 0.1 cm 09/29/23 1356   Wound Volume (cm^3) 0.02 cm^3 09/29/23 1356   Wound Surface Area (cm^2) 0.2 cm^2 09/29/23 1356   Tunneling (depth (cm)/location) 0 09/29/23 1356   Undermining (depth (cm)/location) 0 09/29/23 1356   Care Cleansed with:;Antimicrobial agent;Sterile normal saline 09/29/23 1356   Dressing Changed 09/29/23 1356   Periwound Care Moisture barrier applied;Absorptive dressing applied 09/29/23 1356   Compression Two layer compression 09/29/23 1356   Off Loading Football dressing;Off loading shoe 09/29/23 1356   Dressing Change Due 10/06/23 09/29/23 1356       [REMOVED]      Incision/Site 07/31/23 1405 Left Foot dorsal (Removed)   07/31/23 1405   Present Prior to Hospital Arrival?:    Side: Left   Location: Foot   Orientation: dorsal   Incision Type:    Closure Method:    Additional Comments:    Removal Indication and Assessment:    Wound Outcome: Healed   Removal Indications:    Removed 09/29/23    Wound Image   09/29/23 1356   Incision WDL WDL 09/29/23 1356   Dressing Appearance Intact;Dry 09/29/23 1356   Drainage Amount None 09/29/23 1356   Appearance Closed/resurfaced 09/29/23 1356   Wound Length (cm) 0 cm 09/29/23 1356   Wound Width (cm) 0 cm 09/29/23 1356   Wound Depth (cm) 0 cm 09/29/23 1356   Wound Volume (cm^3) 0 cm^3 09/29/23 1356   Wound Surface Area (cm^2) 0 cm^2 09/29/23 1356   Tunneling (depth (cm)/location) 0 09/29/23 1356   Undermining (depth (cm)/location) 0 09/29/23 1356   Care Cleansed with:;Antimicrobial agent;Sterile normal saline 09/29/23 1356             Plan:              Education about the prevention of limb loss.    Discussed wound healing cycle, skin integrity, ways to care for skin.Counseled patient on the effects of biomechanical pressure,  PVD, and blood glucose on healing. He verbalizes understanding that it can increase the chances of delayed healing and this prolonged exposure leads to  infection or progression of infection which subsequently can result in loss of limb.    The wound is cleansed of foreign material as much as possible and the base inspected for bone or abscess.      Left Dorsal Foot wound appears closed. Left Plantar Foot wound still has 2 pinhole areas open.    He will continue to be primarily nonweightbearing to the surgical limb with scooter assisted ambulation.    Return to clinic in 1 week.    Short-term goals include maintaining good offloading and minimizing bioburden, promoting granulation and epithelialization to healing.  Long-term goals include keeping the wound healed by good offloading and medical management under the direction of internist.      Wound Debridement    Date/Time: 9/29/2023 9:55 AM    Performed by: Marivel Peterson DPM  Authorized by: Marivel Peterson DPM      Wound Details:    Location:  Left foot    Location:  Left Midfoot    Type of Debridement:  Excisional       Length (cm):  1       Area (sq cm):  0.2       Width (cm):  0.2       Percent Debrided (%):  100       Depth (cm):  0.1       Total Area Debrided (sq cm):  0.2    Depth of debridement:  Subcutaneous tissue    Tissue debrided:  Dermis, Epidermis and Subcutaneous    Devitalized tissue debrided:  Callus and Fibrin    Instruments:  Curette  Bleeding:  Minimal  Hemostasis Achieved: Yes  Method Used:  Pressure  Patient tolerance:  Patient tolerated the procedure well with no immediate complications      Orders Placed This Encounter   Procedures    Debridement     This order was created via procedure documentation     Standing Status:   Standing     Number of Occurrences:   1    Change dressing     Wound Dressing Orders     Dressing change frequency once a week   Remove old dressing   Cleanse or irrigate with: Normal Saline   Protect periwound with: Gentian Violet hugging plantar wound bed  then Cavilon to where custom pad will be placed  Primary dressing: WOUND BASE: Cellerate to Plantar wound bed then  "custom felt pad to plantar foot (with small opening to allow for drainage)   Secondary dressing: Mextra 5" x 9", Abram foam long x 2   Off-load/Compression: Football dressing (Cast Padding x 2 rolls); Calamine Coflex (pink layer to skin then football). Darco shoe. Knee scooter for LLE to off-load     Return to clinic in 1 week.          Follow up in about 1 week (around 10/6/2023) for wound care.       "

## 2023-10-06 ENCOUNTER — HOSPITAL ENCOUNTER (OUTPATIENT)
Dept: WOUND CARE | Facility: HOSPITAL | Age: 53
Discharge: HOME | End: 2023-10-06
Attending: PODIATRIST
Payer: MEDICARE

## 2023-10-06 VITALS — DIASTOLIC BLOOD PRESSURE: 97 MMHG | TEMPERATURE: 97 F | HEART RATE: 88 BPM | SYSTOLIC BLOOD PRESSURE: 156 MMHG

## 2023-10-06 DIAGNOSIS — E11.621 DIABETIC ULCER OF LEFT MIDFOOT ASSOCIATED WITH TYPE 2 DIABETES MELLITUS, WITH BONE INVOLVEMENT WITHOUT EVIDENCE OF NECROSIS: Primary | ICD-10-CM

## 2023-10-06 DIAGNOSIS — E11.49 TYPE II DIABETES MELLITUS WITH NEUROLOGICAL MANIFESTATIONS: ICD-10-CM

## 2023-10-06 DIAGNOSIS — L97.426 DIABETIC ULCER OF LEFT MIDFOOT ASSOCIATED WITH TYPE 2 DIABETES MELLITUS, WITH BONE INVOLVEMENT WITHOUT EVIDENCE OF NECROSIS: Primary | ICD-10-CM

## 2023-10-06 PROCEDURE — 99214 PR OFFICE/OUTPT VISIT, EST, LEVL IV, 30-39 MIN: ICD-10-PCS | Mod: ,,, | Performed by: PODIATRIST

## 2023-10-06 PROCEDURE — 99214 OFFICE O/P EST MOD 30 MIN: CPT | Mod: ,,, | Performed by: PODIATRIST

## 2023-10-06 PROCEDURE — 11042 DBRDMT SUBQ TIS 1ST 20SQCM/<: CPT | Performed by: PODIATRIST

## 2023-10-06 NOTE — PROGRESS NOTES
[facility]                Progress Note    Subjective:       Patient ID: Indio Ryder Jr. is a 53 y.o. male.    Chief Complaint: Wound Check, Dressing Change, and Diabetic Foot Ulcer    Follow up wound care visit. Patient ambulated to exam room using knee scooter for LLE with Darco on Left Foot and Right Foot in tennis shoe with lift. Dressing is intact, no strike through drainage. No c/o pain to wound bed at present. Denies fever, chills, nausea, vomiting or diarrhea. He continues to take oral Augmentin as prescribed from ID.    Wound Check      Review of Systems   Constitutional:  Negative for appetite change, chills, fatigue and fever.   HENT:  Negative for hearing loss.    Eyes:  Negative for photophobia and visual disturbance.   Respiratory:  Negative for cough, chest tightness, shortness of breath and wheezing.    Cardiovascular:  Positive for leg swelling. Negative for chest pain and palpitations.   Gastrointestinal:  Negative for constipation, diarrhea, nausea and vomiting.   Endocrine: Negative for cold intolerance and heat intolerance.   Genitourinary:  Negative for flank pain.   Musculoskeletal:  Positive for gait problem and myalgias. Negative for neck pain and neck stiffness.   Skin:  Positive for wound. Negative for color change.   Neurological:  Positive for numbness. Negative for weakness, light-headedness and headaches.        + paresthesia    Psychiatric/Behavioral:  Negative for sleep disturbance.          Objective:        Physical Exam  Constitutional:       Appearance: He is well-developed.   Cardiovascular:      Comments: Dorsalis pedis and posterior tibial pulses are palpable Skin is atrophic, slightly hyperpigmented, and mildly edematous      Musculoskeletal:         General: No tenderness. Normal range of motion.      Comments: Muscle strength is 5/5 in all groups bilaterally.    S/p partial 5th ray amp b/l    S/p hallux amp right    Fat  pad atrophy to heels and met heads bilateral       Skin:     General: Skin is warm and dry.      Coloration: Skin is not jaundiced, mottled or sallow.      Findings: Wound (see below) present. No abscess or ecchymosis.      Comments:        Neurological:      Mental Status: He is alert and oriented to person, place, and time.      Comments: Fithian-Nenita 5.07 monofilamant testing is diminished Kenji feet. Sharp/dull sensation diminished Bilaterally. Light touch absent Bilaterally.       Psychiatric:         Behavior: Behavior normal.         Vitals:    10/06/23 1005   BP: (!) 156/97   Pulse: 88   Temp: 97.2 °F (36.2 °C)       Assessment:           ICD-10-CM ICD-9-CM   1. Diabetic ulcer of left midfoot associated with type 2 diabetes mellitus, with bone involvement without evidence of necrosis  E11.621 250.80    L97.426 707.14   2. Type II diabetes mellitus with neurological manifestations  E11.49 250.60            (Retired) Wound 04/08/22 1137 Diabetic Ulcer Left plantar;lateral Foot (Active)   04/08/22 1137    Pre-existing: Yes   Primary Wound Type: Diabetic ulc   Side: Left   Orientation: plantar;lateral   Location: Foot   Wound Number:    Ankle-Brachial Index:    Pulses:    Removal Indication and Assessment:    Wound Outcome:    (Retired) Wound Type:    (Retired) Wound Length (cm):    (Retired) Wound Width (cm):    (Retired) Depth (cm):    Wound Description (Comments):    Removal Indications:    Wound Image    10/06/23 1042   Wound WDL ex 10/06/23 1042   Dressing Appearance Intact;Dried drainage 10/06/23 1042   Drainage Amount Scant 10/06/23 1042   Drainage Characteristics/Odor Serosanguineous;No odor 10/06/23 1042   Appearance Pink;Epithelialization 10/06/23 1042   Tissue loss description Partial thickness 10/06/23 1042   Black (%), Wound Tissue Color 0 % 10/06/23 1042   Red (%), Wound Tissue Color 100 % 10/06/23 1042   Yellow (%), Wound Tissue Color 0 % 10/06/23 1042   Periwound Area Dry;Intact 10/06/23 1042    Wound Edges Callused;Defined;Open 10/06/23 1042   Wound Length (cm) 0.2 cm 10/06/23 1042   Wound Width (cm) 0.1 cm 10/06/23 1042   Wound Depth (cm) 0 cm 10/06/23 1042   Wound Volume (cm^3) 0 cm^3 10/06/23 1042   Wound Surface Area (cm^2) 0.02 cm^2 10/06/23 1042   Tunneling (depth (cm)/location) 0 10/06/23 1042   Undermining (depth (cm)/location) 0 10/06/23 1042   Care Cleansed with:;Antimicrobial agent;Sterile normal saline 10/06/23 1042   Dressing Changed 10/06/23 1042   Periwound Care Moisture barrier applied 10/06/23 1042   Compression Two layer compression 10/06/23 1042   Off Loading Football dressing;Off loading shoe 10/06/23 1042   Dressing Change Due 10/13/23 10/06/23 1042             Plan:              Education about the prevention of limb loss.    Discussed wound healing cycle, skin integrity, ways to care for skin.Counseled patient on the effects of biomechanical pressure,  PVD, and blood glucose on healing. He verbalizes understanding that it can increase the chances of delayed healing and this prolonged exposure leads to infection or progression of infection which subsequently can result in loss of limb.    The wound is cleansed of foreign material as much as possible and the base inspected for bone or abscess.      Left Dorsal Foot wound still closed. Left Plantar Foot wound has a thin layer of epithelization.     Patient requests same dressing instead of graduating to [facility].  Security dressing applied    He will continue to be primarily nonweightbearing to the surgical limb with scooter assisted ambulation.    Return to clinic in 1 week.    Short-term goals include maintaining good offloading and minimizing bioburden, promoting granulation and epithelialization to healing.  Long-term goals include keeping the wound healed by good offloading and medical management under the direction of internist.      Procedures    Orders Placed This Encounter   Procedures    Change dressing     Wound Dressing  "Orders     Dressing change frequency once a week   Remove old dressing   Cleanse or irrigate with: Normal Saline   Protect periwound with: Gentian Violet hugging plantar wound bed  then Cavilon to where custom pad will be placed   Primary dressing: WOUND BASE: Cellerate to Plantar wound bed then custom felt pad to plantar foot (with small opening to allow for drainage)   Secondary dressing: Mextra 5" x 9", Abram foam long x 2   Off-load/Compression: Football dressing (Cast Padding x 2 rolls); Calamine Coflex (pink layer to skin then football). Darco shoe. Knee scooter for LLE to off-load     Return to clinic in 1 week.          Follow up in about 1 week (around 10/13/2023) for wound care.       "

## 2023-10-09 ENCOUNTER — TELEPHONE (OUTPATIENT)
Dept: INFECTIOUS DISEASES | Facility: CLINIC | Age: 53
End: 2023-10-09
Payer: MEDICARE

## 2023-10-09 NOTE — TELEPHONE ENCOUNTER
----- Message from Lianna Pleitez sent at 10/9/2023  2:07 PM CDT -----  Regarding: Later appt time  Contact: pt @[phone]  Patient is calling to get a later appt time for his appt scheduled on 10-.Pt would like a virtual appt or a 2:00pm appt time. Please c/b to advise..Thank You

## 2023-10-11 ENCOUNTER — PATIENT MESSAGE (OUTPATIENT)
Dept: INFECTIOUS DISEASES | Facility: CLINIC | Age: 53
End: 2023-10-11

## 2023-10-11 ENCOUNTER — OFFICE VISIT (OUTPATIENT)
Dept: INFECTIOUS DISEASES | Facility: CLINIC | Age: 53
End: 2023-10-11
Payer: MEDICARE

## 2023-10-11 DIAGNOSIS — Z51.81 THERAPEUTIC DRUG MONITORING: ICD-10-CM

## 2023-10-11 DIAGNOSIS — M86.10 ACUTE ON CHRONIC OSTEOMYELITIS: Primary | ICD-10-CM

## 2023-10-11 DIAGNOSIS — M86.671 CHRONIC OSTEOMYELITIS OF RIGHT FOOT: ICD-10-CM

## 2023-10-11 DIAGNOSIS — M86.60 ACUTE ON CHRONIC OSTEOMYELITIS: Primary | ICD-10-CM

## 2023-10-11 PROCEDURE — 99212 OFFICE O/P EST SF 10 MIN: CPT | Mod: 95,,, | Performed by: PHYSICIAN ASSISTANT

## 2023-10-11 PROCEDURE — 99212 PR OFFICE/OUTPT VISIT, EST, LEVL II, 10-19 MIN: ICD-10-PCS | Mod: 95,,, | Performed by: PHYSICIAN ASSISTANT

## 2023-10-11 NOTE — PROGRESS NOTES
The patient location is: Home   The chief complaint leading to consultation is: Foot osteomyelitis    Visit type: audiovisual    Face to Face time with patient: 15  5 minutes of total time spent on the encounter, which includes face to face time and non-face to face time preparing to see the patient (eg, review of tests), Obtaining and/or reviewing separately obtained history, Documenting clinical information in the electronic or other health record, Independently interpreting results (not separately reported) and communicating results to the patient/family/caregiver, or Care coordination (not separately reported).         Each patient to whom he or she provides medical services by telemedicine is:  (1) informed of the relationship between the physician and patient and the respective role of any other health care provider with respect to management of the patient; and (2) notified that he or she may decline to receive medical services by telemedicine and may withdraw from such care at any time.    Notes:    Infectious Disease Clinic Note  09/11/23      Subjective:       Patient ID: Indio Ryder Jr. is a 53 y.o. male being seen for a followup visit.    Chief Complaint: No chief complaint on file.    HPI     Mr. Ryder is a 51 y/o M with hx DM and numerous episodes of foot osteo presents for f/up of Lt foot osteomyelitis.    Interval hx:  Last seen on 3/2/23. Completed 6 wks cipro (3/12) and 4 wks augmentin (3/8) for pseudomonas (bone and wound cx) and e faecalis (wound cx). Reports increased drainage and size of wound despite continued adherence to treatment plan concerning for infection. Images under media tab reviewed. Wound cx 5/12 grew enterobacter cloacae and e faecalis.    Hx:   Pt has been following with podiatry for a non healing Lt foot ulcer that per pt, suddenly worsened. MRI  1/19 revealed: Extensive marrow edema involving the 4th TMT joint extending to the 2nd and 3rd TMT joints contiguous with the  overlying soft tissue ulceration, concerning for osteomyelitis.  Underwent bone biopsy of left trochar on 1/20. Bone culture from 1/20 grew pseudomonas. Bone path revealed OM.  Discussed results with patient and started on cipro x 6 wks on 1/29. Subsequent wound culture from 1/27 growing pseudomonas plus e faecalis (R to tetracycline/ S to amp).       Has been undergoing hyperbaric therapy since 12/2022. Had normal ALISSA 11/18/22. Was treated x 4 wks w augmenting for e faecalis positive wound cx and 6 wks with cipro  (last day 3/12) for pseudomonas OM (bone and wound cx +). Underwent graft placement on 2/24 by Dr. Peterson. Had 2 weeks of cipro/amoxacillin 5/16 for wound infection.     7/17/23:  Referred by POD as increased wound drainge and MRI with stable bone changes at TMT area cf osteo.  Reportedly is to have surgy with Dr. Peterson in near future   Bone cultures 7/14 with CONS - on cipro per patient from POD.  Bone cx with CONS (though may be polymicrobial per [facility] micro lab- being worked up.  Stable.    8/14/23:  Bone cultures at [facility] showed E faecalis, Kocuria Kristinae, and corynebacterium.  Pathology showed acute osteo.  PICC placed and was started on daptomycin.  On 7/31 went for debridement and had bone excised.  Cultures showed E faecalis and Staph lugdenensis. He remains on Daptomycin.  Pathology from 7/31 did not show acute osteo.  Reports doing ok without significant foot pain.  PICC doing ok and denies abx side effects.  Daptomycin started 7/25.  The patient denies any recent fever, chills, or sweats.    8/28/23:  Doing well.  Foot healing.  Tolerating IV abx and denies PICC issues.  The patient denies any recent fever, chills, or sweats.    9/11/23:  Doing well and reports foot healing. Denies wounds probe to bone.  Tolerating abx and denies picc problems.  The patient denies any recent fever, chills, or sweats.       Family History   Adopted: Yes   Problem Relation Age of Onset    Hypertension Mother      Cancer Mother         breast    Diabetes Mother     Hypertension Father     Cataracts Father     Heart disease Father         mi    Diabetes Sister     No Known Problems Brother     Heart disease Sister         MI    No Known Problems Sister     Hypertension Maternal Aunt     No Known Problems Maternal Uncle     No Known Problems Paternal Aunt     No Known Problems Paternal Uncle     No Known Problems Maternal Grandmother     No Known Problems Maternal Grandfather     No Known Problems Paternal Grandmother     No Known Problems Paternal Grandfather     Amblyopia Neg Hx     Blindness Neg Hx     Glaucoma Neg Hx     Macular degeneration Neg Hx     Retinal detachment Neg Hx     Strabismus Neg Hx     Stroke Neg Hx     Thyroid disease Neg Hx      Social History     Socioeconomic History    Marital status: Single    Number of children: 0   Occupational History    Occupation: Retired     Employer: [employer]   Tobacco Use    Smoking status: Never    Smokeless tobacco: Never   Substance and Sexual Activity    Alcohol use: No    Drug use: No    Sexual activity: Yes     Partners: Female     Birth control/protection: Condom     Social Determinants of Health     Financial Resource Strain: High Risk (7/13/2023)    Overall Financial Resource Strain (CARDIA)     Difficulty of Paying Living Expenses: Very hard   Food Insecurity: No Food Insecurity (7/13/2023)    Hunger Vital Sign     Worried About Running Out of Food in the Last Year: Never true     Ran Out of Food in the Last Year: Never true   Transportation Needs: No Transportation Needs (7/13/2023)    PRAPARE - Transportation     Lack of Transportation (Medical): No     Lack of Transportation (Non-Medical): No   Physical Activity: Inactive (7/13/2023)    Exercise Vital Sign     Days of Exercise per Week: 0 days     Minutes of Exercise per Session: 0 min   Stress: No Stress Concern Present (7/13/2023)    [place] [employer] - Occupational Stress  Questionnaire     Feeling of Stress : Not at all   Social Connections: Moderately Isolated (7/13/2023)    Social Connection and Isolation Panel [NHANES]     Frequency of Communication with Friends and Family: More than three times a week     Frequency of Social Gatherings with Friends and Family: More than three times a week     Attends Yarsani Services: More than 4 times per year     Active Member of Clubs or Organizations: No     Attends Club or Organization Meetings: Never     Marital Status:    Housing Stability: Low Risk  (7/13/2023)    Housing Stability Vital Sign     Unable to Pay for Housing in the Last Year: No     Number of Places Lived in the Last Year: 1     Unstable Housing in the Last Year: No     Past Surgical History:   Procedure Laterality Date    COLONOSCOPY Right 1/19/2022    Procedure: COLONOSCOPY;  Surgeon: Tj Haile MD;  Location: [facility] (4TH FLR);  Service: Endoscopy;  Laterality: Right;  previous order un-usable/poor prep 1/18/22  RAPID COVID test arrival 12:20  instructions handed to pt- sm    COLONOSCOPY N/A 3/21/2022    Procedure: COLONOSCOPY;  Surgeon: Sheng Haque MD;  Location: [facility] (2ND FLR);  Service: Endoscopy;  Laterality: N/A;  Colonoscopy with AES for hepatix flexure polyp removal, 2nd floor  Pt is fully vaccinated-DS  Pt prescribed Plavix by Dr. Stahl in Jan. 2022, however pt states that he never started taking it-DS  3/16/22-Instructions sent via portal-DS    COLONOSCOPY N/A 9/13/2023    Procedure: COLONOSCOPY;  Surgeon: Erik Stout MD;  Location: [facility] (4TH FLR);  Service: Colon and Rectal;  Laterality: N/A;  Referred by timur Saunders, [facility], portal -ml    DEBRIDEMENT OF FOOT Left 7/14/2019    Procedure: DEBRIDEMENT, FOOT, with left 5th ray amputation;  Surgeon: Sis Hickman DPM;  Location: [facility] OR 2ND FLR;  Service: Podiatry;  Laterality: Left;    DEBRIDEMENT OF FOOT Left 7/17/2019    Procedure: DEBRIDEMENT, FOOT with leftt 5th  "ray partial amputation with Neox Graft;  Surgeon: Mai Burrell DPM;  Location: [facility] OR 2ND FLR;  Service: Podiatry;  Laterality: Left;  t    DEBRIDEMENT OF FOOT Bilateral 4/29/2021    Procedure: DEBRIDEMENT, FOOT;  Surgeon: Mai Burrell DPM;  Location: [facility] OR 1ST FLR;  Service: Podiatry;  Laterality: Bilateral;  Graft application    DEBRIDEMENT OF FOOT Left 7/31/2023    Procedure: DEBRIDEMENT, FOOT;  Surgeon: Marivel Peterson DPM;  Location: [facility] OR 2ND FLR;  Service: Podiatry;  Laterality: Left;    TOE AMPUTATION Right 06/05/2015    TOE AMPUTATION Right 01/19/2017    XI ROBOTIC COLECTOMY, RIGHT N/A 4/25/2022    Procedure: XI ROBOTIC COLECTOMY, RIGHT (lithotomy, eras low);  Surgeon: Erik Stout MD;  Location: [facility] OR 2ND FLR;  Service: Colon and Rectal;  Laterality: N/A;  Paruch to Goodwin    XI ROBOTIC COLECTOMY, RIGHT  4/25/2022    Procedure: ;  Surgeon: Erik Stout MD;  Location: [facility] OR 2ND FLR;  Service: Colon and Rectal;;       Patient's Medications   New Prescriptions    No medications on file   Previous Medications    AMOXICILLIN-CLAVULANATE 875-125MG (AUGMENTIN) 875-125 MG PER TABLET    Take 1 tablet by mouth 2 (two) times a day.    ATORVASTATIN (LIPITOR) 80 MG TABLET    Take 1 tablet (80 mg total) by mouth once daily.    EMPAGLIFLOZIN (JARDIANCE) 10 MG TABLET    Take 1 tablet (10 mg total) by mouth once daily.    HYDROCODONE-ACETAMINOPHEN (NORCO) 5-325 MG PER TABLET    Take 1 tablet by mouth every 6 (six) hours as needed for Pain.    INSULIN ASPART U-100 (NOVOLOG) 100 UNIT/ML (3 ML) INPN PEN    Inject 8 units before meals plus scale 150-200+2, 201-250+4, 251-300+6, 301-350+8, >350+10. Max daily 54 units.    INSULIN DETEMIR U-100 (LEVEMIR FLEXTOUCH) 100 UNIT/ML (3 ML) SUBQ INPN PEN    Inject 26 Units into the skin every evening.    ONETOUCH DELICA LANCETS 33 GAUGE MISC        ONETOUCH ULTRA2 KIT        PEN NEEDLE, DIABETIC (BD SASCHA 2ND GEN PEN NEEDLE) 32 GAUGE X 5/32" " NDLE    Use 4 times a day    SEMAGLUTIDE (OZEMPIC) 1 MG/DOSE (4 MG/3 ML)    Inject 1 mg into the skin every 7 days.   Modified Medications    No medications on file   Discontinued Medications    No medications on file       Patient Active Problem List    Diagnosis Date Noted    Type II diabetes mellitus with neurological manifestations 09/29/2023    Eversion deformity of foot, left 09/15/2023    Hx of amputation of lesser toe, left 09/15/2023    Pre-ulcerative calluses 06/30/2023    Chronic osteomyelitis of right foot 12/09/2022    Diabetic ulcer of right foot associated with type 2 diabetes mellitus, with fat layer exposed 04/26/2022    Colon adenocarcinoma 04/12/2022    Acute on chronic osteomyelitis 04/29/2021    Diabetic ulcer of left foot 04/28/2021    Septic arthritis of left foot 04/28/2021    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 edema, with long-term current use of insulin 03/23/2021    Chronic left shoulder pain 07/30/2020    Allergic reaction due to antibacterial drug 04/05/2020    Subacute osteomyelitis of left foot 03/11/2020    Hypertensive retinopathy, bilateral 01/28/2020    Diabetic ulcer of left midfoot associated with type 2 diabetes mellitus, with bone involvement without evidence of necrosis 08/02/2019    Severe malnutrition 07/22/2019    Diabetic ulcer of right midfoot associated with type 2 diabetes mellitus, with necrosis of bone 07/12/2019    Neck pain 11/08/2017    Hypokalemia 05/30/2017    Actinomyces infection 01/17/2017     Right foot      Type 2 diabetes mellitus with hyperglycemia, with long-term current use of insulin 05/03/2016    Mixed hyperlipidemia 08/12/2014    Essential hypertension 06/06/2013       Review of Systems   Review of Systems   Constitutional:  Negative for chills, fever and malaise/fatigue.   Respiratory:  Negative for cough and shortness of breath.    Cardiovascular:  Negative for chest pain  "and orthopnea.   Gastrointestinal:  Negative for abdominal pain, diarrhea and vomiting.   Genitourinary:  Negative for dysuria.   Musculoskeletal:  Negative for back pain and myalgias.        L foot wound   Neurological:  Negative for sensory change and weakness.           Objective:      There were no vitals taken for this visit.  Estimated body mass index is 29.03 kg/m² as calculated from the following:    Height as of 9/13/23: 6' 1" (1.854 m).    Weight as of 9/13/23: 99.8 kg (220 lb).    Physical Exam  Constitutional:       General: He is not in acute distress.     Appearance: Normal appearance. He is not ill-appearing, toxic-appearing or diaphoretic.   HENT:      Head: Normocephalic and atraumatic.   Neurological:      Mental Status: He is alert.   Psychiatric:         Mood and Affect: Mood normal.         Behavior: Behavior normal.         Thought Content: Thought content normal.         Judgment: Judgment normal.           8/25/23 below x 2                  Photo 8/11 below            Labs:     Latest Reference Range & Units 09/05/23 14:11 09/11/23 11:17   WBC 3.90 - 12.70 K/uL 5.05 4.80   RBC 4.60 - 6.20 M/uL 4.58 (L) 4.48 (L)   Hemoglobin 14.0 - 18.0 g/dL 12.5 (L) 12.9 (L)   Hematocrit 40.0 - 54.0 % 40.9 40.6   MCV 82 - 98 fL 89 91   MCH 27.0 - 31.0 pg 27.3 28.8   MCHC 32.0 - 36.0 g/dL 30.6 (L) 31.8 (L)   RDW 11.5 - 14.5 % 14.8 (H) 14.8 (H)   Platelets 150 - 450 K/uL 259 208   MPV 9.2 - 12.9 fL 11.4 12.2   Gran % 38.0 - 73.0 % 50.1 55.8   Lymph % 18.0 - 48.0 % 39.2 33.8   Mono % 4.0 - 15.0 % 6.1 6.7   Eosinophil % 0.0 - 8.0 % 3.8 2.9   Basophil % 0.0 - 1.9 % 0.6 0.6   Immature Granulocytes 0.0 - 0.5 % 0.2 0.2   Gran # (ANC) 1.8 - 7.7 K/uL 2.5 2.7   Lymph # 1.0 - 4.8 K/uL 2.0 1.6   Mono # 0.3 - 1.0 K/uL 0.3 0.3   Eos # 0.0 - 0.5 K/uL 0.2 0.1   Baso # 0.00 - 0.20 K/uL 0.03 0.03   Immature Grans (Abs) 0.00 - 0.04 K/uL 0.01 0.01   nRBC 0 /100 WBC 0 0   Differential Method  Automated Automated   Sodium 136 - 145 " mmol/L 140 139   Potassium 3.5 - 5.1 mmol/L 3.6 4.0   Chloride 95 - 110 mmol/L 103 108   CO2 23 - 29 mmol/L 24 20 (L)   Anion Gap 8 - 16 mmol/L 13 11   BUN 6 - 20 mg/dL 12 11   Creatinine 0.5 - 1.4 mg/dL 1.0 1.1   eGFR >60 mL/min/1.73 m^2 >60.0 >60.0   Glucose 70 - 110 mg/dL 148 (H) 195 (H)   Calcium 8.7 - 10.5 mg/dL 9.6 9.4   Alkaline Phosphatase 55 - 135 U/L 208 (H) 179 (H)   PROTEIN TOTAL 6.0 - 8.4 g/dL 7.7 7.7   Albumin 3.5 - 5.2 g/dL 3.5 3.4 (L)   BILIRUBIN TOTAL 0.1 - 1.0 mg/dL 0.6 0.6   AST 10 - 40 U/L 22 21   ALT 10 - 44 U/L 24 23   CRP 0.0 - 8.2 mg/L 1.4 1.4   (L): Data is abnormally low  (H): Data is abnormally high      Assessment:         1. Acute on chronic osteomyelitis        2. Chronic osteomyelitis of right foot            Plan:         Diabetic ulcer of left midfoot associated with type 2 diabetes mellitus, with fat layer exposed  53y/o M with hx of LLE DM foot ulcers c/b osteo s/p 6 wks abx (last day 3/12/23), now with increased drainage at nonealing ulcer concerning for infection. Wound cx growing pan-sen enterobacter cloacae and amp- S e faecalis.  Treated 5/16/23 with 2 weeks of po cipro and amoxicillin. Developed increased wound drainage.  MRI L foot with stable changes ar 2,3, and 4 TMT area - likely chonic osteo. Bone culture with E faecalis, corynebacterium, kocuria k - on dapto.  Pathology with acute osteo.  OR 7/31 bone cultures with E faecalis and Staph lugdenensis - Completed 6 weeks of daptomycin from last sx. Gram stain showed budding yeast but no growth on cultures - ? Contaminant. Path without acute osteo.    CRP WNL on labs previously.  On and tolerating Augmentin 875mg po bid as enterococcus has grown consistently and most likely to be cause of chronic osteo    Plan:   Continue on Augmentin 875mg po bid given multiple prior cultures with eneterococcus and suspect likely chronic osteo is still present.  Plan for a min but likley 6 months.  Discussed with patient who agrees.  Rx  sent in with 5 refills previously.  Labs monthly - will arrange  FU 3 months

## 2023-10-11 NOTE — Clinical Note
1. FU 3 months 2. Labs ordered standing monthly - he will be at [facility] on fri - please arrange for him to have them there this fri and monthly if he will be going there thx

## 2023-10-13 ENCOUNTER — HOSPITAL ENCOUNTER (OUTPATIENT)
Dept: WOUND CARE | Facility: HOSPITAL | Age: 53
Discharge: HOME | End: 2023-10-13
Attending: PODIATRIST
Payer: MEDICARE

## 2023-10-13 VITALS — DIASTOLIC BLOOD PRESSURE: 89 MMHG | TEMPERATURE: 98 F | HEART RATE: 101 BPM | SYSTOLIC BLOOD PRESSURE: 162 MMHG

## 2023-10-13 DIAGNOSIS — E11.621 DIABETIC ULCER OF LEFT MIDFOOT ASSOCIATED WITH TYPE 2 DIABETES MELLITUS, WITH BONE INVOLVEMENT WITHOUT EVIDENCE OF NECROSIS: Primary | ICD-10-CM

## 2023-10-13 DIAGNOSIS — Z89.422 HX OF AMPUTATION OF LESSER TOE, LEFT: ICD-10-CM

## 2023-10-13 DIAGNOSIS — L84 PRE-ULCERATIVE CALLUSES: ICD-10-CM

## 2023-10-13 DIAGNOSIS — M86.672 CHRONIC OSTEOMYELITIS OF LEFT FOOT: ICD-10-CM

## 2023-10-13 DIAGNOSIS — Z89.411 RIGHT GREAT TOE AMPUTEE: ICD-10-CM

## 2023-10-13 DIAGNOSIS — L97.426 DIABETIC ULCER OF LEFT MIDFOOT ASSOCIATED WITH TYPE 2 DIABETES MELLITUS, WITH BONE INVOLVEMENT WITHOUT EVIDENCE OF NECROSIS: Primary | ICD-10-CM

## 2023-10-13 DIAGNOSIS — E11.49 TYPE II DIABETES MELLITUS WITH NEUROLOGICAL MANIFESTATIONS: ICD-10-CM

## 2023-10-13 DIAGNOSIS — M21.072 EVERSION DEFORMITY OF FOOT, LEFT: ICD-10-CM

## 2023-10-13 DIAGNOSIS — M21.6X2 EQUINUS DEFORMITY OF BOTH FEET: ICD-10-CM

## 2023-10-13 DIAGNOSIS — M21.6X1 EQUINUS DEFORMITY OF BOTH FEET: ICD-10-CM

## 2023-10-13 DIAGNOSIS — Z89.421 H/O AMPUTATION OF LESSER TOE, RIGHT: ICD-10-CM

## 2023-10-13 PROCEDURE — 99214 PR OFFICE/OUTPT VISIT, EST, LEVL IV, 30-39 MIN: ICD-10-PCS | Mod: ,,, | Performed by: PODIATRIST

## 2023-10-13 PROCEDURE — 99214 OFFICE O/P EST MOD 30 MIN: CPT | Mod: ,,, | Performed by: PODIATRIST

## 2023-10-13 NOTE — PROGRESS NOTES
[facility]                Progress Note    Subjective:       Patient ID: Indio Ryder Jr. is a 53 y.o. male.    Chief Complaint: Wound Check, Dressing Change, and Diabetic Foot Ulcer    Follow up wound care visit. Patient ambulated to exam room using knee scooter for LLE with Darco on Left Foot and Right Foot in tennis shoe with lift. Dressing is intact, no strike through drainage.  Patient relates that he has continued to be compliant with being nonweightbearing to the left lower extremity.  He states that since our last encounter he was seen by orthotist and given a quote for the cost [place] brace, states that he will come out of pocket 20% cost.  Patient relates that he previously had a plan to go to [place] this weekend for the football game but has decided to stay at home.  No c/o pain to wound bed at present. Denies fever, chills, nausea, vomiting or diarrhea. He continues to take oral Augmentin as prescribed from ID.    Wound Check  There is no pain present.     Review of Systems   Constitutional:  Negative for appetite change, chills, fatigue and fever.   HENT:  Negative for hearing loss.    Eyes:  Negative for photophobia and visual disturbance.   Respiratory:  Negative for cough, chest tightness, shortness of breath and wheezing.    Cardiovascular:  Positive for leg swelling. Negative for chest pain and palpitations.   Gastrointestinal:  Negative for constipation, diarrhea, nausea and vomiting.   Endocrine: Negative for cold intolerance and heat intolerance.   Genitourinary:  Negative for flank pain.   Musculoskeletal:  Positive for gait problem and myalgias. Negative for neck pain and neck stiffness.   Skin:  Positive for wound. Negative for color change.   Neurological:  Positive for numbness. Negative for weakness, light-headedness and headaches.        + paresthesia    Psychiatric/Behavioral:  Negative for sleep disturbance.          Objective:         Physical Exam  Constitutional:       Appearance: He is well-developed.   Cardiovascular:      Comments: Dorsalis pedis and posterior tibial pulses are palpable Skin is atrophic, slightly hyperpigmented, and mildly edematous      Musculoskeletal:         General: No tenderness. Normal range of motion.      Comments: Muscle strength is 5/5 in all groups bilaterally.    S/p partial 5th ray amp b/l    S/p hallux amp right    Fat pad atrophy to heels and met heads bilateral       Skin:     General: Skin is warm and dry.      Coloration: Skin is not jaundiced, mottled or sallow.      Findings: Wound (see below) present. No abscess or ecchymosis.      Comments:        Neurological:      Mental Status: He is alert and oriented to person, place, and time.      Comments: [place] 5.07 monofilamant testing is diminished Kenji feet. Sharp/dull sensation diminished Bilaterally. Light touch absent Bilaterally.       Psychiatric:         Behavior: Behavior normal.         Vitals:    10/13/23 1123   BP: (!) 162/89   Pulse: 101   Temp: 97.8 °F (36.6 °C)       Assessment:           ICD-10-CM ICD-9-CM   1. Diabetic ulcer of left midfoot associated with type 2 diabetes mellitus, with bone involvement without evidence of necrosis  E11.621 250.80    L97.426 707.14   2. Chronic osteomyelitis of left foot  M86.672 730.17   3. Type II diabetes mellitus with neurological manifestations  E11.49 250.60   4. Pre-ulcerative calluses  L84 700   5. H/O amputation of lesser toe, right  Z89.421 V49.72   6. Right great toe amputee  Z89.411 V49.71   7. Hx of amputation of lesser toe, left  Z89.422 V49.72   8. Eversion deformity of foot, left  M21.072 736.79   9. Equinus deformity of both feet  M21.6X1 736.79    M21.6X2             (Retired) Wound 04/08/22 1137 Diabetic Ulcer Left plantar;lateral Foot (Active)   04/08/22 1137    Pre-existing: Yes   Primary Wound Type: Diabetic ulc   Side: Left   Orientation: plantar;lateral   Location: Foot    Wound Number:    Ankle-Brachial Index:    Pulses:    Removal Indication and Assessment:    Wound Outcome:    (Retired) Wound Type:    (Retired) Wound Length (cm):    (Retired) Wound Width (cm):    (Retired) Depth (cm):    Wound Description (Comments):    Removal Indications:    Wound Image   10/13/23 1128   Wound WDL ex 10/13/23 1128   Dressing Appearance Intact;Dried drainage 10/13/23 1128   Drainage Amount Small 10/13/23 1128   Drainage Characteristics/Odor Serosanguineous;No odor 10/13/23 1128   Appearance Red;Moist 10/13/23 1128   Tissue loss description Partial thickness 10/13/23 1128   Black (%), Wound Tissue Color 0 % 10/13/23 1128   Red (%), Wound Tissue Color 100 % 10/13/23 1128   Yellow (%), Wound Tissue Color 0 % 10/13/23 1128   Periwound Area Pale white;Moist;Macerated 10/13/23 1128   Wound Edges Defined;Open 10/13/23 1128   Wound Length (cm) 0.4 cm 10/13/23 1128   Wound Width (cm) 0.2 cm 10/13/23 1128   Wound Depth (cm) 0.1 cm 10/13/23 1128   Wound Volume (cm^3) 0.008 cm^3 10/13/23 1128   Wound Surface Area (cm^2) 0.08 cm^2 10/13/23 1128   Tunneling (depth (cm)/location) 0 10/13/23 1128   Undermining (depth (cm)/location) 0 10/13/23 1128   Care Cleansed with:;Antimicrobial agent;Sterile normal saline 10/13/23 1128   Dressing Changed 10/13/23 1128   Periwound Care Moisture barrier applied;Absorptive dressing applied 10/13/23 1128   Compression Two layer compression 10/13/23 1128   Off Loading Football dressing;Off loading shoe 10/13/23 1128   Dressing Change Due 10/20/23 10/13/23 1128             Plan:              Education about the prevention of limb loss.    Discussed wound healing cycle, skin integrity, ways to care for skin.Counseled patient on the effects of biomechanical pressure,  PVD, and blood glucose on healing. He verbalizes understanding that it can increase the chances of delayed healing and this prolonged exposure leads to infection or progression of infection which subsequently can  "result in loss of limb.    The wound is cleansed of foreign material as much as possible and the base inspected for bone or abscess.      Left Dorsal Foot wound still closed. Left Plantar Foot wound has a a pinpoint area that appears to be open with maceration and hyperkeratosis periwound, the remainder of the site is thin epithelium     Today's dressing will attempt to address callus formation and maceration in an attempt to get fully epithelialized so he may transition into shoe or boot.  Prescription will be faxed off for a defender offloading boot so that he can begin some ambulation prior to transitioning in to tennis shoe.  Until that time he will continue to be primarily nonweightbearing to the surgical limb with scooter assisted ambulation.    Return to clinic in 1 week.    Short-term goals include maintaining good offloading and minimizing bioburden, promoting granulation and epithelialization to healing.  Long-term goals include keeping the wound healed by good offloading and medical management under the direction of internist.      Procedures    Orders Placed This Encounter   Procedures    Change dressing     Wound Dressing Orders     Dressing change frequency once a week   Remove old dressing   Cleanse or irrigate with: Normal Saline   Protect periwound with: Gentian Violet hugging plantar wound bed  then Cavilon to where custom pad will be placed   Primary dressing: WOUND BASE: Endoform/Iodoflex (confetti mix) to Plantar wound bed then custom felt pad to plantar foot (with small opening to allow for drainage)   Secondary dressing: Mextra 5" x 9", Abram foam long x 2   Off-load/Compression: Football dressing (Cast Padding x 2 rolls); TLC Coflex (blue layer to skin then football). Darco shoe. Knee scooter for LLE to off-load     Return to clinic in 1 week.          No follow-ups on file.       "

## 2023-10-19 ENCOUNTER — PATIENT OUTREACH (OUTPATIENT)
Dept: ADMINISTRATIVE | Facility: HOSPITAL | Age: 53
End: 2023-10-19
Payer: MEDICARE

## 2023-10-19 ENCOUNTER — PATIENT MESSAGE (OUTPATIENT)
Dept: ADMINISTRATIVE | Facility: HOSPITAL | Age: 53
End: 2023-10-19
Payer: MEDICARE

## 2023-10-19 NOTE — PROGRESS NOTES
Health Maintenance Due   Topic Date Due    Shingles Vaccine (1 of 2) Never done    Pneumococcal Vaccines (Age 0-64) (3 - PPSV23 or PCV20) 08/12/2019    Hemoglobin A1c  04/13/2023    Diabetes Urine Screening  07/01/2023    Influenza Vaccine (1) 09/01/2023    COVID-19 Vaccine (4 - 2023-24 season) 09/01/2023    Eye Exam  10/06/2023    Foot Exam  10/17/2023      Chart reviewed. Triggered LINKS. Updated Care Everywhere.     Peter Washington CMA  Population Health Care Coordinator  Primary Care Team

## 2023-10-20 ENCOUNTER — HOSPITAL ENCOUNTER (OUTPATIENT)
Dept: WOUND CARE | Facility: HOSPITAL | Age: 53
Discharge: HOME | End: 2023-10-20
Attending: PODIATRIST
Payer: MEDICARE

## 2023-10-20 VITALS — SYSTOLIC BLOOD PRESSURE: 156 MMHG | TEMPERATURE: 98 F | HEART RATE: 89 BPM | DIASTOLIC BLOOD PRESSURE: 94 MMHG

## 2023-10-20 DIAGNOSIS — E11.621 DIABETIC ULCER OF LEFT MIDFOOT ASSOCIATED WITH TYPE 2 DIABETES MELLITUS, WITH BONE INVOLVEMENT WITHOUT EVIDENCE OF NECROSIS: Primary | ICD-10-CM

## 2023-10-20 DIAGNOSIS — M86.672 CHRONIC OSTEOMYELITIS OF LEFT FOOT: ICD-10-CM

## 2023-10-20 DIAGNOSIS — L97.426 DIABETIC ULCER OF LEFT MIDFOOT ASSOCIATED WITH TYPE 2 DIABETES MELLITUS, WITH BONE INVOLVEMENT WITHOUT EVIDENCE OF NECROSIS: Primary | ICD-10-CM

## 2023-10-20 PROCEDURE — 11042 DBRDMT SUBQ TIS 1ST 20SQCM/<: CPT | Mod: ,,, | Performed by: PODIATRIST

## 2023-10-20 PROCEDURE — 11042 WOUND DEBRIDEMENT: ICD-10-PCS | Mod: ,,, | Performed by: PODIATRIST

## 2023-10-20 PROCEDURE — 11042 DBRDMT SUBQ TIS 1ST 20SQCM/<: CPT | Performed by: PODIATRIST

## 2023-10-20 PROCEDURE — 99499 UNLISTED E&M SERVICE: CPT | Mod: ,,, | Performed by: PODIATRIST

## 2023-10-20 PROCEDURE — 99499 NO LOS: ICD-10-PCS | Mod: ,,, | Performed by: PODIATRIST

## 2023-10-20 NOTE — PROGRESS NOTES
[facility]                Progress Note    Subjective:       Patient ID: Indio Ryder Jr. is a 53 y.o. male.    Chief Complaint: Wound Care    Follow up wound care visit. Patient ambulated to exam room using knee scooter for LLE with Darco on Left Foot and Right Foot in tennis shoe with lift. Dressing is intact to LLE with no strike through drainage.  Patient relates that he has continued to be compliant with being non weight bearing to the left lower extremity.  No c/o pain to wound bed at present. Denies fever, chills, nausea, vomiting or diarrhea. He continues to take oral Augmentin as prescribed from ID.     Wound Check  There is no pain present.     Review of Systems   Constitutional:  Negative for appetite change, chills, fatigue and fever.   HENT:  Negative for hearing loss.    Eyes:  Negative for photophobia and visual disturbance.   Respiratory:  Negative for cough, chest tightness, shortness of breath and wheezing.    Cardiovascular:  Positive for leg swelling. Negative for chest pain and palpitations.   Gastrointestinal:  Negative for constipation, diarrhea, nausea and vomiting.   Endocrine: Negative for cold intolerance and heat intolerance.   Genitourinary:  Negative for flank pain.   Musculoskeletal:  Positive for gait problem and myalgias. Negative for neck pain and neck stiffness.   Skin:  Positive for wound. Negative for color change.   Neurological:  Positive for numbness. Negative for weakness, light-headedness and headaches.        + paresthesia    Psychiatric/Behavioral:  Negative for sleep disturbance.          Objective:        Physical Exam  Constitutional:       Appearance: He is well-developed.   Cardiovascular:      Comments: Dorsalis pedis and posterior tibial pulses are palpable Skin is atrophic, slightly hyperpigmented, and mildly edematous      Musculoskeletal:         General: No tenderness. Normal range of motion.      Comments:  Muscle strength is 5/5 in all groups bilaterally.    S/p partial 5th ray amp b/l    S/p hallux amp right    Fat pad atrophy to heels and met heads bilateral       Skin:     General: Skin is warm and dry.      Coloration: Skin is not jaundiced, mottled or sallow.      Findings: Wound (see below) present. No abscess or ecchymosis.      Comments:        Neurological:      Mental Status: He is alert and oriented to person, place, and time.      Comments: [place] 5.07 monofilamant testing is diminished Kenji feet. Sharp/dull sensation diminished Bilaterally. Light touch absent Bilaterally.       Psychiatric:         Behavior: Behavior normal.         Vitals:    10/20/23 1110   BP: (!) 156/94   Pulse: 89   Temp: 98.1 °F (36.7 °C)       Assessment:           ICD-10-CM ICD-9-CM   1. Diabetic ulcer of left midfoot associated with type 2 diabetes mellitus, with bone involvement without evidence of necrosis  E11.621 250.80    L97.426 707.14   2. Chronic osteomyelitis of left foot  M86.672 730.17            (Retired) Wound 04/08/22 1137 Diabetic Ulcer Left plantar;lateral Foot (Active)   04/08/22 1137    Pre-existing: Yes   Primary Wound Type: Diabetic ulc   Side: Left   Orientation: plantar;lateral   Location: Foot   Wound Number:    Ankle-Brachial Index:    Pulses:    Removal Indication and Assessment:    Wound Outcome:    (Retired) Wound Type:    (Retired) Wound Length (cm):    (Retired) Wound Width (cm):    (Retired) Depth (cm):    Wound Description (Comments):    Removal Indications:    Wound Image   10/20/23 1746   Wound WDL ex 10/20/23 1746   Dressing Appearance Intact;Dried drainage 10/20/23 1746   Drainage Amount Scant 10/20/23 1746   Drainage Characteristics/Odor Serosanguineous 10/20/23 1746   Appearance Red;Moist 10/20/23 1746   Tissue loss description Partial thickness 10/20/23 1746   Black (%), Wound Tissue Color 0 % 10/20/23 1746   Red (%), Wound Tissue Color 100 % 10/20/23 1746   Yellow (%), Wound Tissue  Color 0 % 10/20/23 1746   Periwound Area Pale white 10/20/23 1746   Wound Edges Defined;Open 10/20/23 1746   Wound Length (cm) 0.2 cm 10/20/23 1746   Wound Width (cm) 0.2 cm 10/20/23 1746   Wound Depth (cm) 0.1 cm 10/20/23 1746   Wound Volume (cm^3) 0.004 cm^3 10/20/23 1746   Wound Surface Area (cm^2) 0.04 cm^2 10/20/23 1746   Tunneling (depth (cm)/location) 0 10/20/23 1746   Undermining (depth (cm)/location) 0 10/20/23 1746   Care Cleansed with:;Antimicrobial agent;Sterile normal saline 10/20/23 1746   Dressing Changed 10/20/23 1746   Periwound Care Moisture barrier applied;Absorptive dressing applied 10/20/23 1746   Compression Two layer compression 10/20/23 1746   Off Loading Football dressing;Off loading shoe 10/20/23 1746   Dressing Change Due 10/27/23 10/20/23 1746             Plan:              Education about the prevention of limb loss.    Discussed wound healing cycle, skin integrity, ways to care for skin.Counseled patient on the effects of biomechanical pressure,  PVD, and blood glucose on healing. He verbalizes understanding that it can increase the chances of delayed healing and this prolonged exposure leads to infection or progression of infection which subsequently can result in loss of limb.    The wound is cleansed of foreign material as much as possible and the base inspected for bone or abscess.      Left Dorsal Foot wound still closed. Left Plantar Foot wound continues to show progress and is measuring smaller.    Today's dressing will attempt to address callus formation and maceration in an attempt to get fully epithelialized so he may transition into shoe or boot.    Defender offloading boot pending. Until that time he will continue to be primarily nonweightbearing to the surgical limb with scooter assisted ambulation.    Return to clinic in 1 week.    Short-term goals include maintaining good offloading and minimizing bioburden, promoting granulation and epithelialization to healing.   "Long-term goals include keeping the wound healed by good offloading and medical management under the direction of internist.      Wound Debridement    Date/Time: 10/20/2023 10:59 AM    Performed by: Marivel Peterson DPM  Authorized by: Marivel Peterson DPM      Wound Details:    Location:  Left foot    Location:  Left Plantar    Type of Debridement:  Excisional       Length (cm):  0.2       Area (sq cm):  0.04       Width (cm):  0.2       Percent Debrided (%):  100       Depth (cm):  0.1       Total Area Debrided (sq cm):  0.04    Depth of debridement:  Subcutaneous tissue    Tissue debrided:  Epidermis, Dermis and Subcutaneous    Devitalized tissue debrided:  Callus and Fibrin    Instruments:  Curette  Bleeding:  Minimal  Hemostasis Achieved: Yes  Method Used:  Pressure  Patient tolerance:  Patient tolerated the procedure well with no immediate complications      Orders Placed This Encounter   Procedures    Debridement     This order was created via procedure documentation     Standing Status:   Standing     Number of Occurrences:   1    Change dressing     Wound Dressing Orders     Dressing change frequency once a week   Remove old dressing   Cleanse or irrigate with: Normal Saline   Protect periwound with: Gentian Violet hugging plantar wound bed  then Benzoin to where custom pad will be placed   Primary dressing: WOUND BASE: Cellerate to wound bed then custom felt pad to plantar foot (with small opening to allow for drainage) then Kaltostat   Secondary dressing: Mextra 5" x 9", Abram foam long x 2   Off-load/Compression: Football dressing (Cast Padding x 2 rolls); TLC Coflex (blue layer to skin then football). Darco shoe. Knee scooter for LLE to off-load     Return to clinic in 1 week.          Follow up in about 1 week (around 10/27/2023) for wound care.       "

## 2023-10-24 ENCOUNTER — OFFICE VISIT (OUTPATIENT)
Dept: INTERNAL MEDICINE | Facility: CLINIC | Age: 53
End: 2023-10-24
Payer: MEDICARE

## 2023-10-24 VITALS — HEART RATE: 84 BPM | OXYGEN SATURATION: 97 % | DIASTOLIC BLOOD PRESSURE: 78 MMHG | SYSTOLIC BLOOD PRESSURE: 112 MMHG

## 2023-10-24 DIAGNOSIS — E11.65 TYPE 2 DIABETES MELLITUS WITH HYPERGLYCEMIA, WITH LONG-TERM CURRENT USE OF INSULIN: Primary | ICD-10-CM

## 2023-10-24 DIAGNOSIS — Z89.422 HX OF AMPUTATION OF LESSER TOE, LEFT: ICD-10-CM

## 2023-10-24 DIAGNOSIS — C18.9 COLON ADENOCARCINOMA: ICD-10-CM

## 2023-10-24 DIAGNOSIS — E78.2 MIXED HYPERLIPIDEMIA: ICD-10-CM

## 2023-10-24 DIAGNOSIS — H35.033 HYPERTENSIVE RETINOPATHY, BILATERAL: ICD-10-CM

## 2023-10-24 DIAGNOSIS — L84 PRE-ULCERATIVE CALLUSES: ICD-10-CM

## 2023-10-24 DIAGNOSIS — E11.621 DIABETIC ULCER OF RIGHT MIDFOOT ASSOCIATED WITH TYPE 2 DIABETES MELLITUS, WITH NECROSIS OF BONE: ICD-10-CM

## 2023-10-24 DIAGNOSIS — L97.414 DIABETIC ULCER OF RIGHT MIDFOOT ASSOCIATED WITH TYPE 2 DIABETES MELLITUS, WITH NECROSIS OF BONE: ICD-10-CM

## 2023-10-24 DIAGNOSIS — I10 ESSENTIAL HYPERTENSION: ICD-10-CM

## 2023-10-24 DIAGNOSIS — Z79.4 TYPE 2 DIABETES MELLITUS WITH HYPERGLYCEMIA, WITH LONG-TERM CURRENT USE OF INSULIN: Primary | ICD-10-CM

## 2023-10-24 PROCEDURE — 99999 PR PBB SHADOW E&M-EST. PATIENT-LVL III: ICD-10-PCS | Mod: PBBFAC,,, | Performed by: NURSE PRACTITIONER

## 2023-10-24 PROCEDURE — 99213 OFFICE O/P EST LOW 20 MIN: CPT | Mod: PBBFAC | Performed by: NURSE PRACTITIONER

## 2023-10-24 PROCEDURE — 99999 PR PBB SHADOW E&M-EST. PATIENT-LVL III: CPT | Mod: PBBFAC,,, | Performed by: NURSE PRACTITIONER

## 2023-10-24 PROCEDURE — 99214 PR OFFICE/OUTPT VISIT, EST, LEVL IV, 30-39 MIN: ICD-10-PCS | Mod: S$PBB,,, | Performed by: NURSE PRACTITIONER

## 2023-10-24 PROCEDURE — 99214 OFFICE O/P EST MOD 30 MIN: CPT | Mod: S$PBB,,, | Performed by: NURSE PRACTITIONER

## 2023-10-24 RX ORDER — INSULIN ASPART 100 [IU]/ML
INJECTION, SOLUTION INTRAVENOUS; SUBCUTANEOUS
Qty: 15 ML | Refills: 8
Start: 2023-10-24

## 2023-10-24 NOTE — PATIENT INSTRUCTIONS
Follow up in 4 months w/ Irielle   A1c urine mac bmp in 5 weeks   A1c in 4 months   Optometry 2023  A1c goal less than 7.5%  Lab Results   Component Value Date    HGBA1C 10.1 (H) 01/13/2023     stop ozempic   Increase jardiance 25 mg daily  Continue lantus 26 units at night     Novolog use if sugar is >180  180-230 2 units, 231-280 4 units, 281-330 6 units , >330 8 untis,  >380 10 units    [email or web address]  Eat fit fili  Priccutpal fili  [email or web address]    mySugr fili

## 2023-10-24 NOTE — PROGRESS NOTES
CHIEF COMPLAINT: Type 2 Diabetes     HPI: Mr. Indio Ryder Jr. is a 53 y.o. male who was diagnosed with Type 2 DM > 20 years.  Has h/o foot ulcers, amputations.  Has boot (L), podiatry in 2019, 2020.  Hospitalized 7/12/19-8/2/19.   Hospitalized 3/2020.  +covid19 3/15/20 vent, extubated 3/26/20, osteomyelitis, DKA  H/o hyperbarics     Last seen by me in jan 2023.  Being seen by me again today.  (L) foot- debridement 7/31/23. Closed x 6-8 weeks. Needs to transfer to ankle brace, worried about tension.  Ozemipc- diarrhea, trulicity -diarrhea  Off glp1a   Noted higher readings    Colon ca stage 1 (R) colectomy 4/25/2022.   Recent colonoscopy    Stopped lisinopril r/t hypotension 90/60s-dealing with orthostatic hypotension    Stressors: older sister, father, cousin passed away in the past year  Arrived alone  Retired, wakes up around 11a.  Lives in [facility].  Cooked meals.     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  Needs >100 strips per month related to fluctuations with blood glucose reading, a1c trends, and activity level.  See above     Diabetes Management Status    Statin: Taking  ACE/ARB: Taking    Screening or Prevention Patient's value Goal Complete/Controlled?   HgA1C Testing and Control   Lab Results   Component Value Date    HGBA1C 10.1 (H) 01/13/2023      Annually/Less than 8% Yes   Lipid profile : 01/13/2023 Annually Yes   LDL control Lab Results   Component Value Date    LDLCALC 113.6 01/13/2023    Annually/Less than 100 mg/dl  No   Nephropathy screening Lab Results   Component Value Date    LABMICR 19.0  07/01/2022     Lab Results   Component Value Date    PROTEINUA 1+ (A) 09/03/2020    Annually Yes   Blood pressure BP Readings from Last 1 Encounters:   10/20/23 (!) 156/94    Less than 140/90 Yes   Dilated retinal exam : 10/06/2022 Annually Yes   Foot exam   : 10/17/2022 Annually Yes     REVIEW OF SYSTEMS  General: no weakness, fatigue, +weight stable  Eyes: no double or blurred vision, eye pain, or redness  Cardiovascular: no chest pain, palpitations, edema, or murmurs.   Respiratory: no cough or dyspnea.   GI: + heartburn, nausea, +diarrhea; fair appetite.   Skin: no rashes, dryness, itching, or reactions at insulin injection sites.  Neuro: +numbness, tingling, tremors, or vertigo. +amputation, boot to (L) 5th digit, (R) 5th, great toe   Endocrine: no polyuria, polydipsia, polyphagia, heat or cold intolerance.     Vital Signs  /78 (BP Location: Left arm, Patient Position: Sitting, BP Method: Medium (Manual))   Pulse 84   SpO2 97%     Hemoglobin A1C   Date Value Ref Range Status   01/13/2023 10.1 (H) 4.0 - 5.6 % Final     Comment:     ADA Screening Guidelines:  5.7-6.4%  Consistent with prediabetes  >or=6.5%  Consistent with diabetes    High levels of fetal hemoglobin interfere with the HbA1C  assay. Heterozygous hemoglobin variants (HbS, HgC, etc)do  not significantly interfere with this assay.   However, presence of multiple variants may affect accuracy.     11/22/2022 9.8 (H) 4.0 - 5.6 % Final     Comment:     ADA Screening Guidelines:  5.7-6.4%  Consistent with prediabetes  >or=6.5%  Consistent with diabetes    High levels of fetal hemoglobin interfere with the HbA1C  assay. Heterozygous hemoglobin variants (HbS, HgC, etc)do  not significantly interfere with this assay.   However, presence of multiple variants may affect accuracy.     07/01/2022 13.3 (H) 4.0 - 5.6 % Final     Comment:     ADA Screening Guidelines:  5.7-6.4%  Consistent with prediabetes  >or=6.5%  Consistent with diabetes    High levels  of fetal hemoglobin interfere with the HbA1C  assay. Heterozygous hemoglobin variants (HbS, HgC, etc)do  not significantly interfere with this assay.   However, presence of multiple variants may affect accuracy.          Chemistry        Component Value Date/Time     10/20/2023 1157    K 3.9 10/20/2023 1157     10/20/2023 1157    CO2 27 10/20/2023 1157    BUN 9 10/20/2023 1157    CREATININE 1.2 10/20/2023 1157     (H) 10/20/2023 1157        Component Value Date/Time    CALCIUM 9.6 10/20/2023 1157    ALKPHOS 178 (H) 10/20/2023 1157    AST 17 10/20/2023 1157    ALT 20 10/20/2023 1157    BILITOT 0.7 10/20/2023 1157    ESTGFRAFRICA >60 07/22/2022 1320    EGFRNONAA >60 07/22/2022 1320           Lab Results   Component Value Date    TSH 1.136 03/01/2012      Lab Results   Component Value Date    CHOL 181 01/13/2023    CHOL 188 07/22/2022    CHOL 100 (L) 07/14/2021     Lab Results   Component Value Date    HDL 49 01/13/2023    HDL 48 07/22/2022    HDL 35 (L) 07/14/2021     Lab Results   Component Value Date    LDLCALC 113.6 01/13/2023    LDLCALC 121.4 07/22/2022    LDLCALC 52.8 (L) 07/14/2021     Lab Results   Component Value Date    TRIG 92 01/13/2023    TRIG 93 07/22/2022    TRIG 61 07/14/2021     Lab Results   Component Value Date    CHOLHDL 27.1 01/13/2023    CHOLHDL 25.5 07/22/2022    CHOLHDL 35.0 07/14/2021       PHYSICAL EXAMINATION  Constitutional: Appears well, no distress  Neck: Supple, trachea midline.   Respiratory: No wheezes, even and unlabored.  Cardiovascular: RRR;  no edema.   Lymph: deferred   Skin: warm and dry; no injection site reactions, no acanthosis nigracans observed.  Neuro:patient alert and cooperative, normal affect; (L) boot, has scooter     Diabetes Foot Exam:   Deferred     Assessment/Plan    1. Type 2 diabetes mellitus with hyperglycemia, with long-term current use of insulin  Hemoglobin A1C    Microalbumin/Creatinine Ratio, Urine    Ambulatory referral/consult to  Optometry    Hemoglobin A1C    insulin aspart U-100 (NOVOLOG) 100 unit/mL (3 mL) InPn pen    empagliflozin (JARDIANCE) 25 mg tablet      2. Pre-ulcerative calluses        3. Mixed hyperlipidemia        4. Hypertensive retinopathy, bilateral  Ambulatory referral/consult to Optometry      5. Hx of amputation of lesser toe, left        6. Essential hypertension        7. Diabetic ulcer of right midfoot associated with type 2 diabetes mellitus, with necrosis of bone        8. Colon adenocarcinoma          1-8. Follow up in 4 months w/ me   A1c urine mac bmp in 5 weeks   A1c in 4 months   A1c goal less than 7.5%  D/c ozempic  Increase jardiance 25 mg daily  Continue basal 26 units at night   Off metformin xr- ses  Discussed mounjaro-defer for now  Optometry 2023   F/u with podiatry   Bp controlled   F/u with GI

## 2023-10-25 DIAGNOSIS — Z79.4 TYPE 2 DIABETES MELLITUS WITH HYPERGLYCEMIA, WITH LONG-TERM CURRENT USE OF INSULIN: ICD-10-CM

## 2023-10-25 DIAGNOSIS — E78.2 MIXED HYPERLIPIDEMIA: ICD-10-CM

## 2023-10-25 DIAGNOSIS — E11.65 TYPE 2 DIABETES MELLITUS WITH HYPERGLYCEMIA, WITH LONG-TERM CURRENT USE OF INSULIN: ICD-10-CM

## 2023-10-25 RX ORDER — ATORVASTATIN CALCIUM 80 MG/1
80 TABLET, FILM COATED ORAL DAILY
Qty: 90 TABLET | Refills: 3
Start: 2023-10-25 | End: 2023-10-30 | Stop reason: SDUPTHER

## 2023-10-25 NOTE — TELEPHONE ENCOUNTER
Care Due:                  Date            Visit Type   Department     Provider  --------------------------------------------------------------------------------                                             [facility] INTERNAL  Last Visit: 07-      PRE-OP       MEDICINE       Fadi COATES -                              PRIMARY      [facility] INTERNAL  Next Visit: 10-      CARE ([facility])   MEDICINE       REJI ENRIQUEZ                                                            Last  Test          Frequency    Reason                     Performed    Due Date  --------------------------------------------------------------------------------    Lipid Panel.  12 months..  atorvastatin.............  01- 01-    [facility] Embedded Care Due Messages. Reference number: [id number].   10/25/2023 11:12:35 AM CDT

## 2023-10-27 ENCOUNTER — HOSPITAL ENCOUNTER (OUTPATIENT)
Dept: WOUND CARE | Facility: HOSPITAL | Age: 53
Discharge: HOME | End: 2023-10-27
Attending: PODIATRIST
Payer: MEDICARE

## 2023-10-27 VITALS
WEIGHT: 220 LBS | HEART RATE: 86 BPM | TEMPERATURE: 99 F | HEIGHT: 73 IN | BODY MASS INDEX: 29.16 KG/M2 | SYSTOLIC BLOOD PRESSURE: 149 MMHG | DIASTOLIC BLOOD PRESSURE: 83 MMHG

## 2023-10-27 DIAGNOSIS — E11.49 TYPE II DIABETES MELLITUS WITH NEUROLOGICAL MANIFESTATIONS: ICD-10-CM

## 2023-10-27 DIAGNOSIS — E11.621 DIABETIC ULCER OF LEFT MIDFOOT ASSOCIATED WITH TYPE 2 DIABETES MELLITUS, WITH BONE INVOLVEMENT WITHOUT EVIDENCE OF NECROSIS: Primary | ICD-10-CM

## 2023-10-27 DIAGNOSIS — L97.426 DIABETIC ULCER OF LEFT MIDFOOT ASSOCIATED WITH TYPE 2 DIABETES MELLITUS, WITH BONE INVOLVEMENT WITHOUT EVIDENCE OF NECROSIS: Primary | ICD-10-CM

## 2023-10-27 PROCEDURE — 99213 PR OFFICE/OUTPT VISIT, EST, LEVL III, 20-29 MIN: ICD-10-PCS | Mod: ,,, | Performed by: PODIATRIST

## 2023-10-27 PROCEDURE — 29581 APPL MULTLAYER CMPRN SYS LEG: CPT

## 2023-10-27 PROCEDURE — 99213 OFFICE O/P EST LOW 20 MIN: CPT | Mod: ,,, | Performed by: PODIATRIST

## 2023-10-27 NOTE — PROGRESS NOTES
[facility]                Progress Note    Subjective:       Patient ID: Indio Ryder Jr. is a 53 y.o. male.    Chief Complaint: Wound Check, Diabetic Foot Ulcer, and Dressing Change    Follow up wound care visit. Patient ambulated to exam room using knee scooter for LLE with Darco on Left Foot and Right Foot in tennis shoe with lift. Dressing is intact to LLE with no strike through drainage.  No c/o pain to wound bed at present. Denies fever, chills, nausea, vomiting or diarrhea. He continues to take oral Augmentin as prescribed from ID.     Wound Check  There is no pain present.     Review of Systems   Constitutional:  Negative for appetite change, chills, fatigue and fever.   HENT:  Negative for hearing loss.    Eyes:  Negative for photophobia and visual disturbance.   Respiratory:  Negative for cough, chest tightness, shortness of breath and wheezing.    Cardiovascular:  Positive for leg swelling. Negative for chest pain and palpitations.   Gastrointestinal:  Negative for constipation, diarrhea, nausea and vomiting.   Endocrine: Negative for cold intolerance and heat intolerance.   Genitourinary:  Negative for flank pain.   Musculoskeletal:  Positive for gait problem and myalgias. Negative for neck pain and neck stiffness.   Skin:  Positive for wound. Negative for color change.   Neurological:  Positive for numbness. Negative for weakness, light-headedness and headaches.        + paresthesia    Psychiatric/Behavioral:  Negative for sleep disturbance.          Objective:        Physical Exam  Constitutional:       Appearance: He is well-developed.   Cardiovascular:      Comments: Dorsalis pedis and posterior tibial pulses are palpable Skin is atrophic, slightly hyperpigmented, and mildly edematous      Musculoskeletal:         General: No tenderness. Normal range of motion.      Comments: Muscle strength is 5/5 in all groups bilaterally.    S/p partial 5th ray  amp b/l    S/p hallux amp right    Fat pad atrophy to heels and met heads bilateral       Skin:     General: Skin is warm and dry.      Coloration: Skin is not jaundiced, mottled or sallow.      Findings: Wound (see below) present. No abscess or ecchymosis.      Comments:        Neurological:      Mental Status: He is alert and oriented to person, place, and time.      Comments: [place] 5.07 monofilamant testing is diminished Kenji feet. Sharp/dull sensation diminished Bilaterally. Light touch absent Bilaterally.       Psychiatric:         Behavior: Behavior normal.         Vitals:    10/27/23 1040   BP: (!) 149/83   Pulse: 86   Temp: 98.5 °F (36.9 °C)       Assessment:           ICD-10-CM ICD-9-CM   1. Diabetic ulcer of left midfoot associated with type 2 diabetes mellitus, with bone involvement without evidence of necrosis  E11.621 250.80    L97.426 707.14   2. Type II diabetes mellitus with neurological manifestations  E11.49 250.60            (Retired) Wound 04/08/22 1137 Diabetic Ulcer Left plantar;lateral Foot (Active)   04/08/22 1137    Pre-existing: Yes   Primary Wound Type: Diabetic ulc   Side: Left   Orientation: plantar;lateral   Location: Foot   Wound Number:    Ankle-Brachial Index:    Pulses:    Removal Indication and Assessment:    Wound Outcome:    (Retired) Wound Type:    (Retired) Wound Length (cm):    (Retired) Wound Width (cm):    (Retired) Depth (cm):    Wound Description (Comments):    Removal Indications:    Wound Image   10/27/23 1647   Dressing Appearance Dry;Dried drainage 10/27/23 1647   Drainage Amount Scant 10/27/23 1647   Drainage Characteristics/Odor Serosanguineous;No odor 10/27/23 1647   Appearance Epithelialization 10/27/23 1647   Tissue loss description Not applicable 10/27/23 1647   Periwound Area Dry;Intact 10/27/23 1647   Wound Edges Callused 10/27/23 1647   Wound Length (cm) 0 cm 10/27/23 1647   Wound Width (cm) 0 cm 10/27/23 1647   Wound Depth (cm) 0 cm 10/27/23 1647    Wound Volume (cm^3) 0 cm^3 10/27/23 1647   Wound Surface Area (cm^2) 0 cm^2 10/27/23 1647   Tunneling (depth (cm)/location) 0 10/27/23 1647   Undermining (depth (cm)/location) 0 10/27/23 1647   Care Cleansed with:;Antimicrobial agent;Sterile normal saline 10/27/23 1647   Dressing Changed 10/27/23 1647   Periwound Care Absorptive dressing applied 10/27/23 1647   Compression Two layer compression 10/27/23 1647   Off Loading Football dressing;Off loading shoe 10/27/23 1647   Dressing Change Due 11/10/23 10/27/23 1647             Plan:              Education about the prevention of limb loss.    Discussed wound healing cycle, skin integrity, ways to care for skin.Counseled patient on the effects of biomechanical pressure,  PVD, and blood glucose on healing. He verbalizes understanding that it can increase the chances of delayed healing and this prolonged exposure leads to infection or progression of infection which subsequently can result in loss of limb.    The wound is cleansed of foreign material as much as possible and the base inspected for bone or abscess.      Left Plantar Foot wound appears closed with thin layer of epithelization noted. Will continue with same dressing and patient will return to [facility] in 2 weeks.    Short-term goals include maintaining good offloading and minimizing bioburden, promoting granulation and epithelialization to healing.  Long-term goals include keeping the wound healed by good offloading and medical management under the direction of internist.      Procedures    Orders Placed This Encounter   Procedures    Change dressing     Wound Dressing Orders     Dressing change frequency bi-weekly a week   Remove old dressing   Cleanse or irrigate with: Normal Saline   Protect periwound with: Gentian Violet hugging plantar wound bed  then Benzoin to where custom pad will be placed   Primary dressing: WOUND BASE: Cellerate to wound bed then custom felt pad to plantar foot (with small opening to  "allow for drainage) then Kaltostat   Secondary dressing: Mextra 5" x 9", Abram foam long x 2   Off-load/Compression: Football dressing (Cast Padding x 2 rolls); TLC Coflex (blue layer to skin then football). Darco shoe. Knee scooter for LLE to off-load     Return to clinic in 2 weeks.          Follow up in about 2 weeks (around 11/10/2023) for wound care.       "

## 2023-10-30 ENCOUNTER — OFFICE VISIT (OUTPATIENT)
Dept: INTERNAL MEDICINE | Facility: CLINIC | Age: 53
End: 2023-10-30
Payer: MEDICARE

## 2023-10-30 VITALS
SYSTOLIC BLOOD PRESSURE: 134 MMHG | HEIGHT: 73 IN | OXYGEN SATURATION: 99 % | WEIGHT: 220 LBS | BODY MASS INDEX: 29.16 KG/M2 | DIASTOLIC BLOOD PRESSURE: 82 MMHG | HEART RATE: 82 BPM

## 2023-10-30 DIAGNOSIS — E11.65 TYPE 2 DIABETES MELLITUS WITH HYPERGLYCEMIA, WITH LONG-TERM CURRENT USE OF INSULIN: ICD-10-CM

## 2023-10-30 DIAGNOSIS — M86.672 CHRONIC OSTEOMYELITIS OF LEFT FOOT: ICD-10-CM

## 2023-10-30 DIAGNOSIS — Z12.5 PROSTATE CANCER SCREENING: ICD-10-CM

## 2023-10-30 DIAGNOSIS — I10 ESSENTIAL HYPERTENSION: ICD-10-CM

## 2023-10-30 DIAGNOSIS — Z85.038 HISTORY OF COLON CANCER: ICD-10-CM

## 2023-10-30 DIAGNOSIS — Z79.4 TYPE 2 DIABETES MELLITUS WITH HYPERGLYCEMIA, WITH LONG-TERM CURRENT USE OF INSULIN: ICD-10-CM

## 2023-10-30 DIAGNOSIS — E78.2 MIXED HYPERLIPIDEMIA: ICD-10-CM

## 2023-10-30 DIAGNOSIS — E11.49 TYPE II DIABETES MELLITUS WITH NEUROLOGICAL MANIFESTATIONS: Primary | ICD-10-CM

## 2023-10-30 DIAGNOSIS — Z00.00 ANNUAL PHYSICAL EXAM: ICD-10-CM

## 2023-10-30 PROBLEM — E43 SEVERE MALNUTRITION: Status: RESOLVED | Noted: 2019-07-22 | Resolved: 2023-10-30

## 2023-10-30 PROCEDURE — 99214 OFFICE O/P EST MOD 30 MIN: CPT | Mod: S$PBB,,, | Performed by: INTERNAL MEDICINE

## 2023-10-30 PROCEDURE — 99999 PR PBB SHADOW E&M-EST. PATIENT-LVL III: ICD-10-PCS | Mod: PBBFAC,,, | Performed by: INTERNAL MEDICINE

## 2023-10-30 PROCEDURE — 99214 PR OFFICE/OUTPT VISIT, EST, LEVL IV, 30-39 MIN: ICD-10-PCS | Mod: S$PBB,,, | Performed by: INTERNAL MEDICINE

## 2023-10-30 PROCEDURE — 99999 PR PBB SHADOW E&M-EST. PATIENT-LVL III: CPT | Mod: PBBFAC,,, | Performed by: INTERNAL MEDICINE

## 2023-10-30 PROCEDURE — 99213 OFFICE O/P EST LOW 20 MIN: CPT | Mod: PBBFAC | Performed by: INTERNAL MEDICINE

## 2023-10-30 RX ORDER — ATORVASTATIN CALCIUM 80 MG/1
80 TABLET, FILM COATED ORAL DAILY
Qty: 90 TABLET | Refills: 3 | Status: SHIPPED | OUTPATIENT
Start: 2023-10-30 | End: 2024-10-29

## 2023-10-30 NOTE — PROGRESS NOTES
Subjective     Patient ID: Indio Ryder Jr. is a 53 y.o. male.    Chief Complaint: Annual Exam    HPI    He has been living in [place], but he plans to move back home soon, as soon as back in shoes.    Dm with neuropathy and recurrent foot ulcerations.    Surgical debridement  L foot in July.  .       He  swearing a boot, but wound has healed..  Awaiting custom ankle brace.    He is taking 1 6 mo course of augmentin for osteomyelitis.    Last a1c in [place].  10.1.    Gigi Clay is managing.  Pedro Pablo was incr.  Ozempic was just stopped due to diarrhea.    He has terrible diarrhea with trulicity and ozempic.     He has orthostatic hypotension.  Off BP meds.  No presncope.    [place] of  colon polyp 3/22.  Colonoscopy 9/13- tubular adenoma.    Labs reviewed.  Mild, improved anemia.  Review of Systems   Constitutional:  Negative for activity change and unexpected weight change.   HENT:  Negative for postnasal drip, rhinorrhea and sinus pressure/congestion.    Respiratory:  Negative for chest tightness and shortness of breath.    Cardiovascular:  Negative for chest pain and leg swelling.   Gastrointestinal:  Negative for abdominal pain, nausea and vomiting.   Genitourinary:  Negative for difficulty urinating, dysuria, hematuria and urgency.   Integumentary:  Negative for rash.   Neurological:  Negative for headaches.   Psychiatric/Behavioral:  Negative for dysphoric mood and sleep disturbance. The patient is not nervous/anxious.           Objective     Physical Exam  Constitutional:       General: He is not in acute distress.     Appearance: He is well-developed. He is not ill-appearing, toxic-appearing or diaphoretic.   HENT:      Head: Normocephalic and atraumatic.   Eyes:      General: No scleral icterus.        Left eye: No discharge.      Conjunctiva/sclera: Conjunctivae normal.   Neck:      Thyroid: No thyromegaly.      Vascular: No JVD.   Cardiovascular:      Rate and Rhythm: Normal rate and regular  rhythm.      Heart sounds: Normal heart sounds. No murmur heard.  Pulmonary:      Effort: Pulmonary effort is normal. No respiratory distress.      Breath sounds: Normal breath sounds. No stridor. No wheezing, rhonchi or rales.   Abdominal:      General: There is no distension.      Palpations: Abdomen is soft. There is no mass.      Tenderness: There is no abdominal tenderness. There is no guarding.   Musculoskeletal:      Cervical back: Normal range of motion. No rigidity.      Right lower leg: No edema.      Left lower leg: No edema.      Comments: L boot.   Lymphadenopathy:      Cervical: No cervical adenopathy.   Skin:     General: Skin is dry.      Findings: No rash.   Neurological:      Mental Status: He is alert and oriented to person, place, and time.   Psychiatric:         Behavior: Behavior normal.         Thought Content: Thought content normal.         Judgment: Judgment normal.            Assessment and Plan     1. Type II diabetes mellitus with neurological manifestations    2. Type 2 diabetes mellitus with hyperglycemia, with long-term current use of insulin  -     atorvastatin (LIPITOR) 80 MG tablet; Take 1 tablet (80 mg total) by mouth once daily.  Dispense: 90 tablet; Refill: 3    3. Mixed hyperlipidemia  -     atorvastatin (LIPITOR) 80 MG tablet; Take 1 tablet (80 mg total) by mouth once daily.  Dispense: 90 tablet; Refill: 3  -     LIPID PANEL; Future; Expected date: 10/30/2023    4. Essential hypertension    5. Chronic osteomyelitis of left foot    6. Annual physical exam    7. History of colon cancer    8. Prostate cancer screening  -     PSA, Screening; Future; Expected date: 10/30/2023             Flu and covid vaccines rec'd.     Follow up in about 1 year (around 10/30/2024) for PE.

## 2023-11-10 ENCOUNTER — HOSPITAL ENCOUNTER (OUTPATIENT)
Dept: WOUND CARE | Facility: HOSPITAL | Age: 53
Discharge: HOME | End: 2023-11-10
Attending: INTERNAL MEDICINE
Payer: MEDICARE

## 2023-11-10 VITALS
TEMPERATURE: 98 F | DIASTOLIC BLOOD PRESSURE: 90 MMHG | SYSTOLIC BLOOD PRESSURE: 165 MMHG | HEIGHT: 73 IN | WEIGHT: 220 LBS | HEART RATE: 82 BPM | BODY MASS INDEX: 29.16 KG/M2

## 2023-11-10 DIAGNOSIS — E11.621 DIABETIC ULCER OF LEFT MIDFOOT ASSOCIATED WITH TYPE 2 DIABETES MELLITUS, WITH BONE INVOLVEMENT WITHOUT EVIDENCE OF NECROSIS: Primary | ICD-10-CM

## 2023-11-10 DIAGNOSIS — L97.426 DIABETIC ULCER OF LEFT MIDFOOT ASSOCIATED WITH TYPE 2 DIABETES MELLITUS, WITH BONE INVOLVEMENT WITHOUT EVIDENCE OF NECROSIS: Primary | ICD-10-CM

## 2023-11-10 PROCEDURE — 29581 APPL MULTLAYER CMPRN SYS LEG: CPT

## 2023-11-10 NOTE — PROGRESS NOTES
[facility]  [address]  [address]  Nurse Visit    Subjective:       Patient seen in clinic today. Patient ambulated to exam room using knee scooter for LLE with Darco on Left Foot and Right Foot in tennis shoe with lift. Dressing is intact to LLE with no strike through drainage. No c/o pain to wound bed at present, but he does c/o of generalized pain rating as 2/10. Denies fever, chills, nausea, vomiting or diarrhea. He continues to take oral Augmentin as prescribed from ID. Left Plantar Foot wound still has epithelization covering wound bed that is intact. He brought new Foot Protector-Defender Boot, advised to bring to next visit for instruction by DPM. Dressing changed as ordered.      Assessment:          (Retired) Wound 04/08/22 1137 Diabetic Ulcer Left plantar;lateral Foot (Active)   04/08/22 1137    Pre-existing: Yes   Primary Wound Type: Diabetic ulc   Side: Left   Orientation: plantar;lateral   Location: Foot   Wound Number:    Ankle-Brachial Index:    Pulses:    Removal Indication and Assessment:    Wound Outcome:    (Retired) Wound Type:    (Retired) Wound Length (cm):    (Retired) Wound Width (cm):    (Retired) Depth (cm):    Wound Description (Comments):    Removal Indications:    Wound Image   11/10/23 0947   Wound WDL ex 11/10/23 0947   Dressing Appearance Intact;Dry 11/10/23 0947   Drainage Amount None 11/10/23 0947   Appearance Dry;Epithelialization 11/10/23 0947   Tissue loss description Not applicable 11/10/23 0947   Periwound Area Dry;Intact 11/10/23 0947   Wound Edges Approximated 11/10/23 0947   Care Cleansed with:;Antimicrobial agent;Sterile normal saline 11/10/23 0947   Dressing Changed 11/10/23 0947   Periwound Care Moisture barrier applied;Absorptive dressing applied 11/10/23 0947   Compression Two layer compression 11/10/23 0947   Off Loading Football dressing;Off loading shoe 11/10/23 0947   Dressing Change Due 11/17/23 11/10/23 0947  "          Plan:     Wound Dressing Orders     Dressing change frequency bi-weekly a week   Remove old dressing   Cleanse or irrigate with: Normal Saline   Protect periwound with: Gentian Violet hugging plantar wound bed  then Benzoin to where custom pad will be placed   Primary dressing: WOUND BASE: Cellerate to wound bed then custom felt pad to plantar foot (with small opening to allow for drainage) then Kaltostat   Secondary dressing: Mextra 5" x 9", Abram foam long x 2   Off-load/Compression: Football dressing (Cast Padding x 2 rolls); TLC Coflex (blue layer to skin then football). Darco shoe. Knee scooter for LLE to off-load           Follow up in about 1 week (around 11/17/2023) for wound care.        "

## 2023-11-13 ENCOUNTER — TELEPHONE (OUTPATIENT)
Dept: INTERNAL MEDICINE | Facility: CLINIC | Age: 53
End: 2023-11-13
Payer: MEDICARE

## 2023-11-13 NOTE — TELEPHONE ENCOUNTER
----- Message from Soledad Posadas sent at 11/13/2023  3:20 PM CST -----  Contact: [employer] [facility]   [employer] is calling to check the status of request for orders for a glucose monitor  Time sensitive

## 2023-11-13 NOTE — TELEPHONE ENCOUNTER
Spoke to [employer]. Let them know paperwork was faxed on 11/9/23 and 11/13/23. Also faxed to number provided by [employer] rep [phone]

## 2023-11-14 NOTE — NURSING TRANSFER
Nursing Transfer Note      3/27/2020     Transfer To: [id number] from 6089    Transfer via bed    Transfer with cardiac monitoring    Transported by RN    Medicines sent: Yes    Chart send with patient: Yes    Notified: sister    Patient reassessed at: 3/27/20 1826    Upon arrival to floor: patient oriented to room, call bell in reach and bed in lowest position   Bilobed Transposition Flap Text: The defect edges were debeveled with a #15 scalpel blade. Given the location of the defect and the proximity to free margins a bilobed transposition flap was deemed most appropriate. Using a sterile surgical marker, an appropriate bilobe flap drawn around the defect. The area thus outlined was incised deep to adipose tissue with a #15 scalpel blade. The skin margins were undermined to an appropriate distance in all directions utilizing iris scissors. Following this, the designed flap was carried over into the primary defect and sutured into place.

## 2023-11-17 ENCOUNTER — HOSPITAL ENCOUNTER (OUTPATIENT)
Dept: WOUND CARE | Facility: HOSPITAL | Age: 53
Discharge: HOME | End: 2023-11-17
Attending: PODIATRIST
Payer: MEDICARE

## 2023-11-17 ENCOUNTER — PATIENT MESSAGE (OUTPATIENT)
Dept: PODIATRY | Facility: CLINIC | Age: 53
End: 2023-11-17
Payer: MEDICARE

## 2023-11-17 VITALS
BODY MASS INDEX: 29.16 KG/M2 | HEIGHT: 73 IN | TEMPERATURE: 97 F | SYSTOLIC BLOOD PRESSURE: 128 MMHG | HEART RATE: 99 BPM | WEIGHT: 220 LBS | DIASTOLIC BLOOD PRESSURE: 75 MMHG

## 2023-11-17 DIAGNOSIS — M21.072 EVERSION DEFORMITY OF FOOT, LEFT: ICD-10-CM

## 2023-11-17 DIAGNOSIS — M21.6X2 EQUINUS DEFORMITY OF BOTH FEET: ICD-10-CM

## 2023-11-17 DIAGNOSIS — Z89.421 H/O AMPUTATION OF LESSER TOE, RIGHT: ICD-10-CM

## 2023-11-17 DIAGNOSIS — M86.672 CHRONIC OSTEOMYELITIS OF LEFT FOOT: ICD-10-CM

## 2023-11-17 DIAGNOSIS — E11.49 TYPE II DIABETES MELLITUS WITH NEUROLOGICAL MANIFESTATIONS: Primary | ICD-10-CM

## 2023-11-17 DIAGNOSIS — Z89.411 RIGHT GREAT TOE AMPUTEE: ICD-10-CM

## 2023-11-17 DIAGNOSIS — M21.6X1 EQUINUS DEFORMITY OF BOTH FEET: ICD-10-CM

## 2023-11-17 DIAGNOSIS — Z89.422 HX OF AMPUTATION OF LESSER TOE, LEFT: ICD-10-CM

## 2023-11-17 DIAGNOSIS — L84 PRE-ULCERATIVE CALLUSES: ICD-10-CM

## 2023-11-17 PROCEDURE — 11042 DBRDMT SUBQ TIS 1ST 20SQCM/<: CPT | Performed by: PODIATRIST

## 2023-11-17 PROCEDURE — 99213 OFFICE O/P EST LOW 20 MIN: CPT | Mod: ,,, | Performed by: PODIATRIST

## 2023-11-17 PROCEDURE — 99213 PR OFFICE/OUTPT VISIT, EST, LEVL III, 20-29 MIN: ICD-10-PCS | Mod: ,,, | Performed by: PODIATRIST

## 2023-11-17 NOTE — PATIENT INSTRUCTIONS
Wound has healed well with no signs of continued skin breakdown noted however skin is still thin and vulnerable   Transition into supportive shoe gear at this time. No barefeet or socks only, no slide on or flat shoes  Check feet daily for signs of drainage or lesion re-opening   Use of daily foot moisturizer to feet, avoiding the webspace's  Apply drying agent such as betadine or gentian violet to healed wound site  Avoid soaking in any water for the first three weeks, then limit showers/bathing to roughly 15 minutes to prevent skin breakdown, try to keep wound dry during this time       Over the counter pain creams: Voltaren Gel, Biofreeze, Bengay, tiger balm, two old goat, lidocaine gel,  Absorbine Veterinary Liniment Gel Topical Analgesic Sore Muscle and Joint Pain Relief  Recommend lotions: eucerin, eucerin for diabetics, aquaphor, A&D ointment, gold bond for diabetics, sween, Rodrigo's Bees all purpose baby ointment,  urea 40 with aloe or SkinIntegra rapid crack repair (found on amazon.com)  Shoe recommendations: (try 6pm.com, zappos.VONTRAVEL , Netminingstromrack.VONTRAVEL, or shoes.VONTRAVEL for discounted prices) you can visit varsity shoes in [place], DSW shoes in [place]  or [facility] in the [facility] (there are also several shoe brand outlets in the [facility])  ONLY purchase stability style tennis shoes NO flex, foam, free, yoga mat style shoes  Asics (GT 4000 or gel foundations), new balance stability type shoes (such as the 940 series), des (stabil c3),  Falcon (GTS or Beast or transcend), propet, HokaOne (tennis shoe) Vignesh (tennis shoes and boots)  Sofbirdie Whitten (women) Akira&Jeffy (men), clarks, cropeng, aerosoles, naturalizers, SAS, ecco, born, darin schumacher, rockports (dress shoes)  Vionic, burkenstocks, fitflops, propet, taos, baretraps (sandals)  HokaOne sandals, crocs, propet (house shoes)    Nail Home remedy:  Vicks Vapor rub or Emuaid to nails for easier manageability    Diabetes: Inspecting Your  Feet  Diabetes increases your chances of developing foot problems. So inspect your feet every day. This helps you find small skin irritations before they become serious infections. If you have trouble seeing the bottoms of your feet, use a mirror or ask a family member or friend to help.     Pressure spots on the bottom of the foot are common areas where problems develop.   How to check your feet  Below are tips to help you look for foot problems. Try to check your feet at the same time each day, such as when you get out of bed in the morning:  Check the top of each foot. The tops of toes, back of the heel, and outer edge of the foot can get a lot of rubbing from poor-fitting shoes.  Check the bottom of each foot. Daily wear and tear often leads to problems at pressure spots.  Check the toes and nails. Fungal infections often occur between toes. Toenail problems can also be a sign of fungal infections or lead to breaks in the skin.  Check your shoes, too. Loose objects inside a shoe can injure the foot. Use your hand to feel inside your shoes for things like pavel, loose stitching, or rough areas that could irritate your skin.  Warning signs  Look for any color changes in the foot. Redness with streaks can signal a severe infection, which needs immediate medical attention. Tell your doctor right away if you have any of these problems:  Swelling, sometimes with color changes, may be a sign of poor blood flow or infection. Symptoms include tenderness and an increase in the size of your foot.  Warm or hot areas on your feet may be signs of infection. A foot that is cold may not be getting enough blood.  Sensations such as burning, tingling, or pins and needles can be signs of a problem. Also check for areas that may be numb.  Hot spots are caused by friction or pressure. Look for hot spots in areas that get a lot of rubbing. Hot spots can turn into blisters, calluses, or sores.  Cracks and sores are caused by dry  or irritated skin. They are a sign that the skin is breaking down, which can lead to infection.  Toenail problems to watch for include nails growing into the skin (ingrown toenail) and causing redness or pain. Thick, yellow, or discolored nails can signal a fungal infection.  Drainage and odor can develop from untreated sores and ulcers. Call your doctor right away if you notice white or yellow drainage, bleeding, or unpleasant odor.   © 7028-9734 [employer]. [address] [place] [address]. All rights reserved. This information is not intended as a substitute for professional medical care. Always follow your healthcare professional's instructions.        Step-by-Step:  Inspecting Your Feet (Diabetes)    Date Last Reviewed: 10/1/2016  © 3089-8381 [employer]. [address] [place] [address]. All rights reserved. This information is not intended as a substitute for professional medical care. Always follow your healthcare professional's instructions.

## 2023-11-17 NOTE — PROGRESS NOTES
[facility]                Progress Note    Subjective:       Patient ID: Indio Ryder Jr. is a 53 y.o. male.    Chief Complaint: Wound Check, Dressing Change, and Diabetic Foot Ulcer    Follow up wound care visit. Patient ambulated to exam room using knee scooter for LLE with CAM boot on LLE and Right Foot in tennis shoe with lift. Dressing is intact to LLE with no strike through drainage. No c/o pain to wound bed at present. Denies fever, chills, nausea, vomiting or diarrhea.  Presents to clinic with defender boot as prescribed.  Patient relates that he will be leaving to go out of town and will not be able to following clinic next week, he will returned from vacation on Monday.    Wound Check  There is no pain present.     Review of Systems   Constitutional:  Negative for appetite change, chills, fatigue and fever.   HENT:  Negative for hearing loss.    Eyes:  Negative for photophobia and visual disturbance.   Respiratory:  Negative for cough, chest tightness, shortness of breath and wheezing.    Cardiovascular:  Positive for leg swelling. Negative for chest pain and palpitations.   Gastrointestinal:  Negative for constipation, diarrhea, nausea and vomiting.   Endocrine: Negative for cold intolerance and heat intolerance.   Genitourinary:  Negative for flank pain.   Musculoskeletal:  Positive for gait problem and myalgias. Negative for neck pain and neck stiffness.   Skin:  Positive for wound. Negative for color change.   Neurological:  Positive for numbness. Negative for weakness, light-headedness and headaches.        + paresthesia    Psychiatric/Behavioral:  Negative for sleep disturbance.          Objective:        Physical Exam  Constitutional:       Appearance: He is well-developed.   Cardiovascular:      Comments: Dorsalis pedis and posterior tibial pulses are palpable Skin is atrophic, slightly hyperpigmented, and mildly edematous      Musculoskeletal:          General: No tenderness. Normal range of motion.      Comments: Muscle strength is 5/5 in all groups bilaterally.    S/p partial 5th ray amp b/l    S/p hallux amp right    Fat pad atrophy to heels and met heads bilateral       Skin:     General: Skin is warm and dry.      Coloration: Skin is not jaundiced, mottled or sallow.      Findings: Wound (see below) present. No abscess or ecchymosis.      Comments:        Neurological:      Mental Status: He is alert and oriented to person, place, and time.      Comments: [place] 5.07 monofilamant testing is diminished Kenji feet. Sharp/dull sensation diminished Bilaterally. Light touch absent Bilaterally.       Psychiatric:         Behavior: Behavior normal.         Vitals:    11/17/23 1017   BP: 128/75   Pulse: 99   Temp: 97.1 °F (36.2 °C)       Assessment:           ICD-10-CM ICD-9-CM   1. Type II diabetes mellitus with neurological manifestations  E11.49 250.60   2. Pre-ulcerative calluses  L84 700   3. H/O amputation of lesser toe, right  Z89.421 V49.72   4. Right great toe amputee  Z89.411 V49.71   5. Hx of amputation of lesser toe, left  Z89.422 V49.72   6. Eversion deformity of foot, left  M21.072 736.79   7. Equinus deformity of both feet  M21.6X1 736.79    M21.6X2    8. Chronic osteomyelitis of left foot  M86.672 730.17            (Retired) Wound 04/08/22 1137 Diabetic Ulcer Left plantar;lateral Foot (Active)   04/08/22 1137    Pre-existing: Yes   Primary Wound Type: Diabetic ulc   Side: Left   Orientation: plantar;lateral   Location: Foot   Wound Number:    Ankle-Brachial Index:    Pulses:    Removal Indication and Assessment:    Wound Outcome:    (Retired) Wound Type:    (Retired) Wound Length (cm):    (Retired) Wound Width (cm):    (Retired) Depth (cm):    Wound Description (Comments):    Removal Indications:    Wound Image   11/17/23 1740   Wound WDL WDL 11/17/23 1740   Dressing Appearance Intact;Dry 11/17/23 1740   Drainage Amount None 11/17/23 1740    Appearance Epithelialization 11/17/23 1740   Tissue loss description Not applicable 11/17/23 1740   Periwound Area Dry;Intact 11/17/23 1740   Wound Edges Approximated 11/17/23 1740   Wound Length (cm) 0 cm 11/17/23 1740   Wound Width (cm) 0 cm 11/17/23 1740   Wound Depth (cm) 0 cm 11/17/23 1740   Wound Volume (cm^3) 0 cm^3 11/17/23 1740   Wound Surface Area (cm^2) 0 cm^2 11/17/23 1740   Tunneling (depth (cm)/location) 0 11/17/23 1740   Undermining (depth (cm)/location) 0 11/17/23 1740   Care Cleansed with:;Antimicrobial agent;Sterile normal saline 11/17/23 1740   Dressing Changed 11/17/23 1740   Periwound Care Moisture barrier applied;Absorptive dressing applied 11/17/23 1740   Compression Tubular elasticized bandage 11/17/23 1740   Off Loading Off loading shoe 11/17/23 1740   Dressing Change Due 11/27/23 11/17/23 1740             Plan:              Education about the prevention of limb loss.    Discussed wound healing cycle, skin integrity, ways to care for skin.Counseled patient on the effects of biomechanical pressure,  PVD, and blood glucose on healing. He verbalizes understanding that it can increase the chances of delayed healing and this prolonged exposure leads to infection or progression of infection which subsequently can result in loss of limb.    The wound is cleansed of foreign material as much as possible and the base inspected for bone or abscess.      Left Plantar Foot wound appears closed with thin layer of epithelization noted.  Despite patient hesitation, we will not be wrapping the entire limb today and instead a tegafoam is placed to the plantar wound site with Tubigrip for compression.  Because patient will be out of town next week, he will follow with his podiatrist at [facility] Dr. [address] on 11/27/2023 to ensure that wound has remained healed.    Long-term goals include keeping the wound healed by good offloading and medical management under the direction of  internist.      Procedures    Orders Placed This Encounter   Procedures    Ambulatory referral/consult to Physical/Occupational Therapy     Standing Status:   Future     Number of Occurrences:   1     Standing Expiration Date:   12/17/2024     Referral Priority:   Routine     Referral Type:   Physical Medicine     Referral Reason:   Specialty Services Required     Number of Visits Requested:   1    Change dressing     Wound Dressing Orders    Dressing change frequency prn  Remove old dressing  Cleanse or irrigate with: Normal Saline  Protect periwound with:  periwound: Benzoin tincture   Primary dressing - adjust to fit: WOUND BASE: thin layer of Cellerate over wound bed  Secondary dressing: Tegafoam  Off-load/Compression: Tubi-, single layer, size E. Ankle Brace then Rx Defender Boot applied by MD.          Follow up in about 10 days (around 11/27/2023).

## 2023-11-21 ENCOUNTER — CLINICAL SUPPORT (OUTPATIENT)
Dept: REHABILITATION | Facility: HOSPITAL | Age: 53
End: 2023-11-21
Payer: MEDICARE

## 2023-11-21 DIAGNOSIS — M21.072 EVERSION DEFORMITY OF FOOT, LEFT: ICD-10-CM

## 2023-11-21 DIAGNOSIS — Z89.421 H/O AMPUTATION OF LESSER TOE, RIGHT: ICD-10-CM

## 2023-11-21 DIAGNOSIS — M21.6X1 EQUINUS DEFORMITY OF BOTH FEET: ICD-10-CM

## 2023-11-21 DIAGNOSIS — M21.6X2 EQUINUS DEFORMITY OF BOTH FEET: ICD-10-CM

## 2023-11-21 PROCEDURE — 97110 THERAPEUTIC EXERCISES: CPT

## 2023-11-21 PROCEDURE — 97162 PT EVAL MOD COMPLEX 30 MIN: CPT

## 2023-11-21 NOTE — PROGRESS NOTES
[facility]   Physical Therapy Initial Evaluation      Name: Indio Ryder Jr.  Clinic Number: [id number]    Therapy Diagnosis:   Encounter Diagnoses   Name Primary?    H/O amputation of lesser toe, right     Eversion deformity of foot, left     Equinus deformity of both feet      Physician: Marivel Peterson, CLEM    Physician Orders: PT Eval and Treat   Medical Diagnosis from Referral: Z89.421 (ICD-10-CM) - H/O amputation of lesser toe, right M21.072 (ICD-10-CM) - Eversion deformity of foot, left M21.6X1,M21.6X2 (ICD-10-CM) - Equinus deformity of both feet  Evaluation Date: 11/21/2023  Authorization Period Expiration: 11/16/2024  Plan of Care Expiration: 1/21/2024  Visit # / Visits authorized: 1/ 1    Foto  Date  Score    #1/3 11/21/2023 44   #2/3     #3/3       Time In: 1:30pm (patient arrived late to 1:00pm appointment)   Time Out: 2:40pm  Total Appointment Time (timed & untimed codes): 70 minutes    Precautions: Standard    Subjective     Date of injury: 2002  History of current condition - patient states that he has been in a boot for a year and a half so his Podiatrist wanted him to start doing PHYSICAL THERAPY to work his foot. Patient has history of diabetic ulcer on his L foot resulting in amputation. He states that he just got his new walking boot on L foot on 11/17/2023 so that he can gradually put more weight onto his foot.      Prior medical treatment: 5 years ago, patient had amputation and Achilles lengthening surgery. Patient had debridement at foot 3 years ago, 2 years ago and again in July of 2023.     History of falls: none    Occupation/job title: not currently working. Last worked in 2017. Patient states that he is on disability due to his feet and his L arm/rotator cuff.     Current functional status: mild limitation   Previous functional status: no limitation     Imaging:   X-ray of L foot in July: FINDINGS: There has been resection of the 5th metatarsal partial  "infection of the 4th metatarsal. There is soft tissue swelling and a large ulcer crater.  There are antibiotic beads in place.    Social History: currently staying with his sister since having surgery in July of 2023. Has 3 stairs to negotiate to enter his regular home.     Pain:  Current 0/10, worst 0/10, best 0/10   Location: B feet   Description: "really can't tell because I dont put pressure on it"   Aggravating Factors: WB through L LE  Easing Factors: wearing boot, non weight bearing     Pt's goals: Return to previous level of function     Medical History:   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    Right foot    Colon adenocarcinoma 04/12/2022    Diabetic ketoacidosis without coma associated with type 2 diabetes mellitus 05/30/2017    Diabetic ulcer of right foot associated with type 2 diabetes mellitus 06/03/2015    Disorder of kidney and ureter     Essential hypertension 06/06/2013    Group B streptococcal infection 01/13/2017    Mixed hyperlipidemia 08/12/2014    Septic arthritis of left foot 04/28/2021    Shoulder impingement 08/12/2014    Subacute osteomyelitis of right foot 01/12/2017    Streptococcus agalactiae, Actinomyces odontolyticus    Traumatic amputation of fifth toe of right foot 07/02/2015    Type 2 diabetes mellitus with diabetic neuropathy, with long-term current use of insulin 05/03/2016     Surgical History:   Indio Ryder Marshall  has a past surgical history that includes Toe amputation (Right, 06/05/2015); Toe amputation (Right, 01/19/2017); Debridement of foot (Left, 7/14/2019); Debridement of foot (Left, 7/17/2019); Debridement of foot (Bilateral, 4/29/2021); Colonoscopy (Right, 1/19/2022); Colonoscopy (N/A, 3/21/2022); xi robotic colectomy, right (N/A, 4/25/2022); xi robotic colectomy, right (4/25/2022); Debridement of foot (Left, 7/31/2023); and Colonoscopy (N/A, 9/13/2023).    Medications:   Indio has a current medication list which includes " "the following prescription(s): amoxicillin-clavulanate 875-125mg, atorvastatin, empagliflozin, insulin aspart u-100, insulin detemir u-100 (levemir), onetouch delica lancets, onetouch ultra2 meter, and pen needle, diabetic.    Allergies:   Review of patient's allergies indicates:  No Known Allergies     Objective    11/21/2023:     Gait/assistive device: Knee scooter and boot     Range of Motion: AROM (PROM):  Ankle Right  Left    Dorsiflexion 12 4   Plantarflexion 30 31   Inversion 32 15   Eversion 13 7     Strength:  Ankle Right  Left    Dorsiflexion 4/5 4/5   Plantarflexion 4/5 4/5   Inversion 4/5 4-/5   Eversion 3+/5 3+/5     Joint Mobility:   L TCJ, STJ, METs: tba    Palpation/sensation:   LE light touch sensation: tba    Flexibility:   Gastroc: tba  Soleus: tba  Plantar fascia: tba    Function/balance:   Sit - stand: tba X in 30    Edema:   R ankle circumference: 58.0 cm  L ankle circumference: 57.5 cm    R ankle joint line: 28.5 cm  L ankle joint line: 31.0 cm    R mets: 22cm  L mets: 22.5 cm    Intake Outcome Measure for FOTO Foot Survey    Therapist reviewed FOTO scores   FOTO documents entered into [employer] - see Media section.    Score: 44           Treatment   Total Treatment time (time-based codes) separate from Evaluation: 25 minutes    therapeutic exercises to develop strength, endurance, ROM, flexibility, posture, and core stabilization for 25 minutes:  Patient education on nature of current condition and PHYSICAL THERAPY POC  Written HOME EXERCISE PROGRAM provided, reviewed, patient demo understanding   R/L ankle eversion, blue band: 3x10  R/L towel swipes, slide board: 2x15   B ankle pumps, 5" hold each way: 3x10   (Initiate) arch domes in sitting   (Initiate) marble lifts     manual therapy techniques: Joint mobilizations, Manual traction, Myofacial release, Soft tissue Mobilization, and Friction Massage for 00 minutes:      neuromuscular re-education activities to improve: Balance, Coordination, " Kinesthetic, Sense, Proprioception, and Posture for 00 minutes:  (Initiate) fitter board circles   (Initiate) BAPS board multidirectional movements     therapeutic activities to improve functional performance for 00 minutes:  (Initiate) Recumbent bike     gait training to improve functional mobility and safety for 00 minutes:      Home Exercises and Patient Education Provided  Education provided:   - yes    Home Exercises Provided: yes.  Exercises were reviewed and Indio was able to demonstrate them prior to the end of the session.  Indio demonstrated good  understanding of the education provided.     Assessment     Indio is a 53 y.o. referred to outpatient Physical Therapy with a medical diagnosis of Z89.421 (ICD-10-CM) - H/O amputation of lesser toe, right M21.072 (ICD-10-CM) - Eversion deformity of foot, left M21.6X1,M21.6X2 (ICD-10-CM) - Equinus deformity of both feet.     Pt presents with decreased ROM, muscle strength, flexibility, joint mobility. Increased pain, stiffness, soft tissue restriction. Impaired posture, joint mechanics, balance, gait pattern.     The patient's current functional deficits include the following: limitation in ADL, self care, social/recreational tasks     Patient prognosis: Good  Rehab potential: Good     Patient will benefit from skilled outpatient Physical Therapy to address the deficits stated above and in the chart below, provide patient /family education, to maximize patient's level of independence, and to address functional deficits.    Plan of care discussed with patient: Yes  Patient's spiritual, cultural and educational needs considered and patient is agreeable to the plan of care and goals as stated below:     Anticipated Barriers for therapy: chronicity of current injury and multiple surgical interventions to tx area, multiple tx areas     Medical Necessity is demonstrated by the following  History  Co-morbidities and personal factors that may impact the plan of care  [] LOW: no personal factors / co-morbidities  [x] MODERATE: 1-2 personal factors / co-morbidities  [] HIGH: 3+ personal factors / co-morbidities    Moderate / High Support Documentation:   Co-morbidities affecting plan of care:    Actinomyces infection    Right foot    Colon adenocarcinoma    Diabetic ketoacidosis without coma associated with type 2 diabetes mellitus    Diabetic ulcer of right foot associated with type 2 diabetes mellitus    Disorder of kidney and ureter    Essential hypertension    Group B streptococcal infection    Mixed hyperlipidemia    Septic arthritis of left foot    Shoulder impingement    Subacute osteomyelitis of right foot    Streptococcus agalactiae, Actinomyces odontolyticus    Traumatic amputation of fifth toe of right foot    Type 2 diabetes mellitus with diabetic neuropathy, with long-term current use of insulin     Personal Factors:   social background  lifestyle     Examination  Body Structures and Functions, activity limitations and participation restrictions that may impact the plan of care [] LOW: addressing 1-2 elements  [x] MODERATE: 3+ elements  [] HIGH: 4+ elements (please support below)    Moderate / High Support Documentation: Moderate      Clinical Presentation [] LOW: stable  [x] MODERATE: Evolving  [] HIGH: Unstable     Decision Making/ Complexity Score: moderate       Goals:   Short Terms Goals: 3 weeks    Goal  Progress  Date    (1)  Patient will be I with HOME EXERCISE PROGRAM  Initial, Not Met 11/21/2023   (2)  Patient will obtain 12 degrees L ankle DF to facilitate improved gait pattern during community mobility tasks   Initial, Not Met   11/21/2023   (3)   Patient will obtain 23 deg L ankle inversion to facilitate improved ability to change direction while moving quickly   Initial, Not Met   11/21/2023     Long Term Goals: 6 weeks    Goal  Progress  Date    (1)  Patient will obtain 12 deg L ankle eversion to facilitate improved ability to complete transfers  into/out of vehicle Initial, Not Met  11/21/2023   (2)   Patient will obtain 4/5 L ankle inversion MMT to facilitate improved ability to step over obstacles in pathway   Initial, Not Met  11/21/2023   (3)   Patient will obtain 4/5 ankle eversion MMT to facilitate improved ability to complete community mobility tasks   Initial, Not Met  11/21/2023      Plan     Plan of care Certification: 11/21/2023 to 1/21/2024.    Outpatient Physical Therapy 2 times weekly for 6 weeks o include the following interventions: Cervical/Lumbar Traction, Electrical Stimulation re-eval, dry needling, Gait Training, Iontophoresis (with ), Manual Therapy, Moist Heat/ Ice, Neuromuscular Re-ed, Patient Education, Self Care, Therapeutic Activities, and Therapeutic Exercise.     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     Physical therapist and physical therapy assistant(s) will met face to face to discuss patient's treatment plan and progress towards established goals. Pt will be seen by a physical therapist minimally every 6th visit or every 30 days.    Geetha Khan, PT  11/21/2023       I CERTIFY THE NEED FOR THESE SERVICES FURNISHED UNDER THIS PLAN OF TREATMENT AND WHILE UNDER MY CARE     Physician's comments:           Physician's Signature: ___________________________________________________

## 2023-11-22 NOTE — PLAN OF CARE
[facility]   Physical Therapy Initial Evaluation      Name: Indio Ryder Jr.  Clinic Number: [id number]    Therapy Diagnosis:   Encounter Diagnoses   Name Primary?    H/O amputation of lesser toe, right     Eversion deformity of foot, left     Equinus deformity of both feet      Physician: Marivel Peterson, CLEM    Physician Orders: PT Eval and Treat   Medical Diagnosis from Referral: Z89.421 (ICD-10-CM) - H/O amputation of lesser toe, right M21.072 (ICD-10-CM) - Eversion deformity of foot, left M21.6X1,M21.6X2 (ICD-10-CM) - Equinus deformity of both feet  Evaluation Date: 11/21/2023  Authorization Period Expiration: 11/16/2024  Plan of Care Expiration: 1/21/2024  Visit # / Visits authorized: 1/ 1    Foto  Date  Score    #1/3 11/21/2023 44   #2/3     #3/3       Time In: 1:30pm (patient arrived late to 1:00pm appointment)   Time Out: 2:40pm  Total Appointment Time (timed & untimed codes): 70 minutes    Precautions: Standard    Subjective     Date of injury: 2002  History of current condition - patient states that he has been in a boot for a year and a half so his Podiatrist wanted him to start doing PHYSICAL THERAPY to work his foot. Patient has history of diabetic ulcer on his L foot resulting in amputation. He states that he just got his new walking boot on L foot on 11/17/2023 so that he can gradually put more weight onto his foot.      Prior medical treatment: 5 years ago, patient had amputation and Achilles lengthening surgery. Patient had debridement at foot 3 years ago, 2 years ago and again in July of 2023.     History of falls: none    Occupation/job title: not currently working. Last worked in 2017. Patient states that he is on disability due to his feet and his L arm/rotator cuff.     Current functional status: mild limitation   Previous functional status: no limitation     Imaging:   X-ray of L foot in July: FINDINGS: There has been resection of the 5th metatarsal partial  "infection of the 4th metatarsal. There is soft tissue swelling and a large ulcer crater.  There are antibiotic beads in place.    Social History: currently staying with his sister since having surgery in July of 2023. Has 3 stairs to negotiate to enter his regular home.     Pain:  Current 0/10, worst 0/10, best 0/10   Location: B feet   Description: "really can't tell because I dont put pressure on it"   Aggravating Factors: WB through L LE  Easing Factors: wearing boot, non weight bearing     Pt's goals: Return to previous level of function     Medical History:   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    Right foot    Colon adenocarcinoma 04/12/2022    Diabetic ketoacidosis without coma associated with type 2 diabetes mellitus 05/30/2017    Diabetic ulcer of right foot associated with type 2 diabetes mellitus 06/03/2015    Disorder of kidney and ureter     Essential hypertension 06/06/2013    Group B streptococcal infection 01/13/2017    Mixed hyperlipidemia 08/12/2014    Septic arthritis of left foot 04/28/2021    Shoulder impingement 08/12/2014    Subacute osteomyelitis of right foot 01/12/2017    Streptococcus agalactiae, Actinomyces odontolyticus    Traumatic amputation of fifth toe of right foot 07/02/2015    Type 2 diabetes mellitus with diabetic neuropathy, with long-term current use of insulin 05/03/2016     Surgical History:   Indio Ryder Marshall  has a past surgical history that includes Toe amputation (Right, 06/05/2015); Toe amputation (Right, 01/19/2017); Debridement of foot (Left, 7/14/2019); Debridement of foot (Left, 7/17/2019); Debridement of foot (Bilateral, 4/29/2021); Colonoscopy (Right, 1/19/2022); Colonoscopy (N/A, 3/21/2022); xi robotic colectomy, right (N/A, 4/25/2022); xi robotic colectomy, right (4/25/2022); Debridement of foot (Left, 7/31/2023); and Colonoscopy (N/A, 9/13/2023).    Medications:   Indio has a current medication list which includes " "the following prescription(s): amoxicillin-clavulanate 875-125mg, atorvastatin, empagliflozin, insulin aspart u-100, insulin detemir u-100 (levemir), onetouch delica lancets, onetouch ultra2 meter, and pen needle, diabetic.    Allergies:   Review of patient's allergies indicates:  No Known Allergies     Objective    11/21/2023:     Gait/assistive device: Knee scooter and boot     Range of Motion: AROM (PROM):  Ankle Right  Left    Dorsiflexion 12 4   Plantarflexion 30 31   Inversion 32 15   Eversion 13 7     Strength:  Ankle Right  Left    Dorsiflexion 4/5 4/5   Plantarflexion 4/5 4/5   Inversion 4/5 4-/5   Eversion 3+/5 3+/5     Joint Mobility:   L TCJ, STJ, METs: tba    Palpation/sensation:   LE light touch sensation: tba    Flexibility:   Gastroc: tba  Soleus: tba  Plantar fascia: tba    Function/balance:   Sit - stand: tba X in 30    Edema:   R ankle circumference: 58.0 cm  L ankle circumference: 57.5 cm    R ankle joint line: 28.5 cm  L ankle joint line: 31.0 cm    R mets: 22cm  L mets: 22.5 cm    Intake Outcome Measure for FOTO Foot Survey    Therapist reviewed FOTO scores   FOTO documents entered into [employer] - see Media section.    Score: 44           Treatment   Total Treatment time (time-based codes) separate from Evaluation: 25 minutes    therapeutic exercises to develop strength, endurance, ROM, flexibility, posture, and core stabilization for 25 minutes:  Patient education on nature of current condition and PHYSICAL THERAPY POC  Written HOME EXERCISE PROGRAM provided, reviewed, patient demo understanding   R/L ankle eversion, blue band: 3x10  R/L towel swipes, slide board: 2x15   B ankle pumps, 5" hold each way: 3x10   (Initiate) arch domes in sitting   (Initiate) marble lifts     manual therapy techniques: Joint mobilizations, Manual traction, Myofacial release, Soft tissue Mobilization, and Friction Massage for 00 minutes:      neuromuscular re-education activities to improve: Balance, Coordination, " Kinesthetic, Sense, Proprioception, and Posture for 00 minutes:  (Initiate) fitter board circles   (Initiate) BAPS board multidirectional movements     therapeutic activities to improve functional performance for 00 minutes:  (Initiate) Recumbent bike     gait training to improve functional mobility and safety for 00 minutes:      Home Exercises and Patient Education Provided  Education provided:   - yes    Home Exercises Provided: yes.  Exercises were reviewed and Indio was able to demonstrate them prior to the end of the session.  Indio demonstrated good  understanding of the education provided.     Assessment     Indio is a 53 y.o. referred to outpatient Physical Therapy with a medical diagnosis of Z89.421 (ICD-10-CM) - H/O amputation of lesser toe, right M21.072 (ICD-10-CM) - Eversion deformity of foot, left M21.6X1,M21.6X2 (ICD-10-CM) - Equinus deformity of both feet.     Pt presents with decreased ROM, muscle strength, flexibility, joint mobility. Increased pain, stiffness, soft tissue restriction. Impaired posture, joint mechanics, balance, gait pattern.     The patient's current functional deficits include the following: limitation in ADL, self care, social/recreational tasks     Patient prognosis: Good  Rehab potential: Good     Patient will benefit from skilled outpatient Physical Therapy to address the deficits stated above and in the chart below, provide patient /family education, to maximize patient's level of independence, and to address functional deficits.    Plan of care discussed with patient: Yes  Patient's spiritual, cultural and educational needs considered and patient is agreeable to the plan of care and goals as stated below:     Anticipated Barriers for therapy: chronicity of current injury and multiple surgical interventions to tx area, multiple tx areas     Medical Necessity is demonstrated by the following  History  Co-morbidities and personal factors that may impact the plan of care  [] LOW: no personal factors / co-morbidities  [x] MODERATE: 1-2 personal factors / co-morbidities  [] HIGH: 3+ personal factors / co-morbidities    Moderate / High Support Documentation:   Co-morbidities affecting plan of care:    Actinomyces infection    Right foot    Colon adenocarcinoma    Diabetic ketoacidosis without coma associated with type 2 diabetes mellitus    Diabetic ulcer of right foot associated with type 2 diabetes mellitus    Disorder of kidney and ureter    Essential hypertension    Group B streptococcal infection    Mixed hyperlipidemia    Septic arthritis of left foot    Shoulder impingement    Subacute osteomyelitis of right foot    Streptococcus agalactiae, Actinomyces odontolyticus    Traumatic amputation of fifth toe of right foot    Type 2 diabetes mellitus with diabetic neuropathy, with long-term current use of insulin     Personal Factors:   social background  lifestyle     Examination  Body Structures and Functions, activity limitations and participation restrictions that may impact the plan of care [] LOW: addressing 1-2 elements  [x] MODERATE: 3+ elements  [] HIGH: 4+ elements (please support below)    Moderate / High Support Documentation: Moderate      Clinical Presentation [] LOW: stable  [x] MODERATE: Evolving  [] HIGH: Unstable     Decision Making/ Complexity Score: moderate       Goals:   Short Terms Goals: 3 weeks    Goal  Progress  Date    (1)  Patient will be I with HOME EXERCISE PROGRAM  Initial, Not Met 11/21/2023   (2)  Patient will obtain 12 degrees L ankle DF to facilitate improved gait pattern during community mobility tasks   Initial, Not Met   11/21/2023   (3)   Patient will obtain 23 deg L ankle inversion to facilitate improved ability to change direction while moving quickly   Initial, Not Met   11/21/2023     Long Term Goals: 6 weeks    Goal  Progress  Date    (1)  Patient will obtain 12 deg L ankle eversion to facilitate improved ability to complete transfers  into/out of vehicle Initial, Not Met  11/21/2023   (2)   Patient will obtain 4/5 L ankle inversion MMT to facilitate improved ability to step over obstacles in pathway   Initial, Not Met  11/21/2023   (3)   Patient will obtain 4/5 ankle eversion MMT to facilitate improved ability to complete community mobility tasks   Initial, Not Met  11/21/2023      Plan     Plan of care Certification: 11/21/2023 to 1/21/2024.    Outpatient Physical Therapy 2 times weekly for 6 weeks o include the following interventions: Cervical/Lumbar Traction, Electrical Stimulation re-eval, dry needling, Gait Training, Iontophoresis (with ), Manual Therapy, Moist Heat/ Ice, Neuromuscular Re-ed, Patient Education, Self Care, Therapeutic Activities, and Therapeutic Exercise.     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     Physical therapist and physical therapy assistant(s) will met face to face to discuss patient's treatment plan and progress towards established goals. Pt will be seen by a physical therapist minimally every 6th visit or every 30 days.    Geetha Khan, PT  11/21/2023       I CERTIFY THE NEED FOR THESE SERVICES FURNISHED UNDER THIS PLAN OF TREATMENT AND WHILE UNDER MY CARE     Physician's comments:           Physician's Signature: ___________________________________________________

## 2023-11-28 ENCOUNTER — OFFICE VISIT (OUTPATIENT)
Dept: PODIATRY | Facility: CLINIC | Age: 53
End: 2023-11-28
Payer: MEDICARE

## 2023-11-28 VITALS
HEART RATE: 101 BPM | DIASTOLIC BLOOD PRESSURE: 76 MMHG | SYSTOLIC BLOOD PRESSURE: 113 MMHG | HEIGHT: 73 IN | BODY MASS INDEX: 29.16 KG/M2 | RESPIRATION RATE: 18 BRPM | WEIGHT: 220 LBS

## 2023-11-28 DIAGNOSIS — Z87.2 HEALED ULCER OF FOOT ON EXAMINATION: ICD-10-CM

## 2023-11-28 DIAGNOSIS — E11.621 DIABETIC ULCER OF RIGHT MIDFOOT ASSOCIATED WITH TYPE 2 DIABETES MELLITUS, WITH MUSCLE INVOLVEMENT WITHOUT EVIDENCE OF NECROSIS: Primary | ICD-10-CM

## 2023-11-28 DIAGNOSIS — L97.415 DIABETIC ULCER OF RIGHT MIDFOOT ASSOCIATED WITH TYPE 2 DIABETES MELLITUS, WITH MUSCLE INVOLVEMENT WITHOUT EVIDENCE OF NECROSIS: Primary | ICD-10-CM

## 2023-11-28 PROCEDURE — 99999 PR PBB SHADOW E&M-EST. PATIENT-LVL III: ICD-10-PCS | Mod: PBBFAC,,, | Performed by: PODIATRIST

## 2023-11-28 PROCEDURE — 99213 PR OFFICE/OUTPT VISIT, EST, LEVL III, 20-29 MIN: ICD-10-PCS | Mod: S$PBB,,, | Performed by: PODIATRIST

## 2023-11-28 PROCEDURE — 99213 OFFICE O/P EST LOW 20 MIN: CPT | Mod: S$PBB,,, | Performed by: PODIATRIST

## 2023-11-28 PROCEDURE — 99999 PR PBB SHADOW E&M-EST. PATIENT-LVL III: CPT | Mod: PBBFAC,,, | Performed by: PODIATRIST

## 2023-11-28 PROCEDURE — 99213 OFFICE O/P EST LOW 20 MIN: CPT | Mod: PBBFAC | Performed by: PODIATRIST

## 2023-11-29 ENCOUNTER — CLINICAL SUPPORT (OUTPATIENT)
Dept: REHABILITATION | Facility: HOSPITAL | Age: 53
End: 2023-11-29
Payer: MEDICARE

## 2023-11-29 DIAGNOSIS — M21.6X2 EQUINUS DEFORMITY OF BOTH FEET: Primary | ICD-10-CM

## 2023-11-29 DIAGNOSIS — Z89.421 H/O AMPUTATION OF LESSER TOE, RIGHT: ICD-10-CM

## 2023-11-29 DIAGNOSIS — M21.6X1 EQUINUS DEFORMITY OF BOTH FEET: Primary | ICD-10-CM

## 2023-11-29 PROCEDURE — 97112 NEUROMUSCULAR REEDUCATION: CPT | Mod: CQ

## 2023-11-29 PROCEDURE — 97110 THERAPEUTIC EXERCISES: CPT | Mod: CQ

## 2023-11-29 NOTE — PROGRESS NOTES
"[facility]   Physical Therapy Treatment Note      Name: Indio Ryder Jr.  Clinic Number: [id number]    Therapy Diagnosis:   Encounter Diagnoses   Name Primary?    Equinus deformity of both feet Yes    H/O amputation of lesser toe, right      Physician: Marivel Peetrson DPM    Visit Date: 11/29/2023    Physician Orders: PT Eval and Treat   Medical Diagnosis from Referral: Z89.421 (ICD-10-CM) - H/O amputation of lesser toe, right M21.072 (ICD-10-CM) - Eversion deformity of foot, left M21.6X1,M21.6X2 (ICD-10-CM) - Equinus deformity of both feet  Evaluation Date: 11/21/2023  Authorization Period Expiration: 11/16/2024  Plan of Care Expiration: 1/21/2024  Visit # / Visits authorized: 1/ 20     Foto  Date  Score    #1/3 11/21/2023 44   #2/3       #3/3          Time In: 2:25 pm (patient arrived late to 2:00pm appointment)   Time Out: 3:20 pm  Total Appointment Time (timed & untimed codes): 55 minutes     Precautions: Standard    PTA Visit #: 1/5       Subjective     Patient reports: "my foot is very stiff".  He was compliant with home exercise program.  Response to previous treatment: no adverse effects  Functional change: continues to ambulate with a scooter     Pain: 0/10  Location: left foot      Objective      Objective Measures updated at progress report unless specified.     Treatment     Indio received the treatments listed below:      therapeutic exercises to develop strength, endurance, ROM, flexibility, posture, and core stabilization for 40 minutes:  Patient education on nature of current condition and PHYSICAL THERAPY POC  Written HOME EXERCISE PROGRAM provided, reviewed, patient demo understanding   R/L ankle eversion, blue band: 3x10  Right/left ankle DF, red band: 3x10  R/L towel swipes, slide board: 2x15   Right/left towel scrunches: 2x15  B ankle pumps, 5" hold each way: 3x10   +arch domes in sitting: 3x10  (Initiate) marble lifts      manual therapy techniques: Joint " mobilizations, Manual traction, Myofacial release, Soft tissue Mobilization, and Friction Massage for 00 minutes:        neuromuscular re-education activities to improve: Balance, Coordination, Kinesthetic, Sense, Proprioception, and Posture for 15 minutes:  +2 point fitter board, fwd/bkw: x30/direction  +1 point fitter board, cw/ccw circles: 2x15/direction  +static standing with narrow SHER on Airex pad, intermittent upper extremity support, EO: 2x30 sec  +static standing with narrow SHER on Airex pad, intermittent upper extremity support, EC: 2x30 sec  +static standing in modified tandem stance on Airex pad, intermittent upper extremity support, EO: 2x30 sec/position  +static standing in modified tandem stance on Airex pad, intermittent upper extremity support, EC: 2x30 sec/position  (Initiate) BAPS board multidirectional movements      therapeutic activities to improve functional performance for 00 minutes:  (Initiate) Recumbent bike      gait training to improve functional mobility and safety for 00 minutes:       Patient Education and Home Exercises       Education provided:   - Patient was educated on all therapeutic interventions performed during today's treatment visit.     Written Home Exercises Provided: Patient instructed to cont prior HEP. Exercises were reviewed and Indio was able to demonstrate them prior to the end of the session.  Indio demonstrated good  understanding of the education provided. See Electronic Medical Record under Patient Instructions for exercises provided during therapy sessions    Assessment     Patient demonstrates significant mobility limitations in left foot, including: toes,mid foot, ankle. Patient responded well to treatment with no pain reported throughout treatment session. Will continue promote mobility and balance during subsequent therapeutic visits.      Patient prognosis is Good.     Patient will continue to benefit from skilled outpatient physical therapy to address  the deficits listed in the problem list box on initial evaluation, provide pt/family education and to maximize pt's level of independence in the home and community environment.     Patient's spiritual, cultural and educational needs considered and pt agreeable to plan of care and goals.     Anticipated barriers to physical therapy: chronicity of current injury and multiple surgical interventions to tx area, multiple tx areas     Goals:   Short Terms Goals: 3 weeks     Goal  Progress  Date    (1)  Patient will be I with HOME EXERCISE PROGRAM  Initial, Not Met 11/21/2023   (2)  Patient will obtain 12 degrees L ankle DF to facilitate improved gait pattern during community mobility tasks   Initial, Not Met   11/21/2023   (3)   Patient will obtain 23 deg L ankle inversion to facilitate improved ability to change direction while moving quickly   Initial, Not Met   11/21/2023      Long Term Goals: 6 weeks     Goal  Progress  Date    (1)  Patient will obtain 12 deg L ankle eversion to facilitate improved ability to complete transfers into/out of vehicle Initial, Not Met  11/21/2023   (2)   Patient will obtain 4/5 L ankle inversion MMT to facilitate improved ability to step over obstacles in pathway   Initial, Not Met  11/21/2023   (3)   Patient will obtain 4/5 ankle eversion MMT to facilitate improved ability to complete community mobility tasks   Initial, Not Met  11/21/2023          Plan     Plan of care Certification: 11/21/2023 to 1/21/2024.     Outpatient Physical Therapy 2 times weekly for 6 weeks o include the following interventions: Cervical/Lumbar Traction, Electrical Stimulation re-eval, dry needling, Gait Training, Iontophoresis (with ), Manual Therapy, Moist Heat/ Ice, Neuromuscular Re-ed, Patient Education, Self Care, Therapeutic Activities, and Therapeutic Exercise.      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      Physical therapist and physical therapy assistant(s) will met face to face to discuss patient's treatment plan and progress towards established goals. Pt will be seen by a physical therapist minimally every 6th visit or every 30 days    Javid Griffiths, PTA

## 2023-12-01 ENCOUNTER — HOSPITAL ENCOUNTER (OUTPATIENT)
Dept: WOUND CARE | Facility: HOSPITAL | Age: 53
Discharge: HOME | End: 2023-12-01
Attending: INTERNAL MEDICINE
Payer: MEDICARE

## 2023-12-01 ENCOUNTER — CLINICAL SUPPORT (OUTPATIENT)
Dept: REHABILITATION | Facility: HOSPITAL | Age: 53
End: 2023-12-01
Payer: MEDICARE

## 2023-12-01 VITALS — DIASTOLIC BLOOD PRESSURE: 95 MMHG | HEART RATE: 84 BPM | TEMPERATURE: 97 F | SYSTOLIC BLOOD PRESSURE: 169 MMHG

## 2023-12-01 DIAGNOSIS — Z89.421 H/O AMPUTATION OF LESSER TOE, RIGHT: ICD-10-CM

## 2023-12-01 DIAGNOSIS — M21.6X2 EQUINUS DEFORMITY OF BOTH FEET: Primary | ICD-10-CM

## 2023-12-01 DIAGNOSIS — E11.49 TYPE II DIABETES MELLITUS WITH NEUROLOGICAL MANIFESTATIONS: Primary | ICD-10-CM

## 2023-12-01 DIAGNOSIS — L84 PRE-ULCERATIVE CALLUSES: ICD-10-CM

## 2023-12-01 DIAGNOSIS — M21.6X1 EQUINUS DEFORMITY OF BOTH FEET: Primary | ICD-10-CM

## 2023-12-01 PROCEDURE — 97112 NEUROMUSCULAR REEDUCATION: CPT

## 2023-12-01 PROCEDURE — 97110 THERAPEUTIC EXERCISES: CPT

## 2023-12-01 PROCEDURE — 99213 OFFICE O/P EST LOW 20 MIN: CPT

## 2023-12-01 NOTE — PROGRESS NOTES
"[facility]   Physical Therapy Treatment Note      Name: Indio Ryder Jr.  Clinic Number: [id number]    Therapy Diagnosis:   Encounter Diagnoses   Name Primary?    Equinus deformity of both feet Yes    H/O amputation of lesser toe, right      Physician: Marivel Peterson DPM    Visit Date: 12/1/2023    Physician Orders: PT Eval and Treat   Medical Diagnosis from Referral: Z89.421 (ICD-10-CM) - H/O amputation of lesser toe, right M21.072 (ICD-10-CM) - Eversion deformity of foot, left M21.6X1,M21.6X2 (ICD-10-CM) - Equinus deformity of both feet  Evaluation Date: 11/21/2023  Authorization Period Expiration: 11/16/2024  Plan of Care Expiration: 1/21/2024  Visit # / Visits authorized: 3/20     Foto  Date  Score    #1/3 11/21/2023 44   #2/3       #3/3          Time In: 215pm   Time Out: 3:10pm  Total Appointment Time (timed & untimed codes): 55 minutes     Precautions: Standard    PTA Visit #: 0/5     Subjective     Patient reports: "my foot is very stiff still." Went to wound care this morning and was wrapped and reports to stay in wrap until next Friday - which will limit some of his exercises today.   He was compliant with home exercise program.  Response to previous treatment: no adverse effects  Functional change: continues to ambulate with a scooter     Pain: 0/10  Location: left foot      Objective      Objective Measures updated at progress report unless specified.     Treatment     Indio received the treatments listed below:      therapeutic exercises to develop strength, endurance, ROM, flexibility, posture, and core stabilization for 40 minutes:  Patient education on nature of current condition and PHYSICAL THERAPY POC  Written HOME EXERCISE PROGRAM provided, reviewed, patient demo understanding   Arch domes in sitting: 3x10  (Initiate) marble lifts   Right/left towel scrunches: 2x15 - unable to today with wrap in place  R/L ankle eversion, blue band: 3x10  Right/left ankle DF, red " "band: 3x10  R/L towel swipes, slide board: 2x15   B ankle pumps, 5" hold each way: 3x10   + LAQ 10# 3x10  + Standing hip abduction 5# 3x10  + Standing hip extension 5# 3x10  + Nustep level 4.0 x5 minutes      manual therapy techniques: Joint mobilizations, Manual traction, Myofacial release, Soft tissue Mobilization, and Friction Massage for 00 minutes:       neuromuscular re-education activities to improve: Balance, Coordination, Kinesthetic, Sense, Proprioception, and Posture for 15 minutes:  2 point fitter board, fwd/bkw: x30/direction  1 point fitter board, cw/ccw circles: 2x15/direction  static standing with narrow SHER on Airex pad, intermittent upper extremity support, EO: 2x30 sec  static standing with narrow SHER on Airex pad, intermittent upper extremity support, EC: 2x30 sec  static standing in modified tandem stance on Airex pad, intermittent upper extremity support, EO: 2x30 sec/position  static standing in modified tandem stance on Airex pad, intermittent upper extremity support, EC: 2x30 sec/position  (Initiate) BAPS board multidirectional movements      therapeutic activities to improve functional performance for 00 minutes:  (Initiate) Recumbent bike      gait training to improve functional mobility and safety for 00 minutes:       Patient Education and Home Exercises       Education provided:   - Patient was educated on all therapeutic interventions performed during today's treatment visit.     Written Home Exercises Provided: Patient instructed to cont prior HEP. Exercises were reviewed and Indio was able to demonstrate them prior to the end of the session.  Indio demonstrated good  understanding of the education provided. See Electronic Medical Record under Patient Instructions for exercises provided during therapy sessions    Assessment     Patient demonstrates significant mobility limitations in left foot, including: toes,mid foot, ankle. Treatment plan for today mild adjusted per pt having " wound care appointment prior to today's visit and foot currently wrapped; pt noted needs to keep wrapped until next wound care appointment on Friday next week. No manual care and limited foot intrinsic strengthening performed due to this. Added additional LE strengthening in open chain as well as nu step today for ongoing strength mobility, endurance training. Patient responded well to treatment with no pain reported throughout treatment session. Will continue promote mobility and balance during subsequent therapeutic visits.      Patient prognosis is Good.     Patient will continue to benefit from skilled outpatient physical therapy to address the deficits listed in the problem list box on initial evaluation, provide pt/family education and to maximize pt's level of independence in the home and community environment.     Patient's spiritual, cultural and educational needs considered and pt agreeable to plan of care and goals.     Anticipated barriers to physical therapy: chronicity of current injury and multiple surgical interventions to tx area, multiple tx areas     Goals:   Short Terms Goals: 3 weeks     Goal  Progress  Date    (1)  Patient will be I with HOME EXERCISE PROGRAM  Initial, Not Met 11/21/2023   (2)  Patient will obtain 12 degrees L ankle DF to facilitate improved gait pattern during community mobility tasks   Initial, Not Met   11/21/2023   (3)   Patient will obtain 23 deg L ankle inversion to facilitate improved ability to change direction while moving quickly   Initial, Not Met   11/21/2023      Long Term Goals: 6 weeks     Goal  Progress  Date    (1)  Patient will obtain 12 deg L ankle eversion to facilitate improved ability to complete transfers into/out of vehicle Initial, Not Met  11/21/2023   (2)   Patient will obtain 4/5 L ankle inversion MMT to facilitate improved ability to step over obstacles in pathway   Initial, Not Met  11/21/2023   (3)   Patient will obtain 4/5 ankle eversion MMT to  facilitate improved ability to complete community mobility tasks   Initial, Not Met  11/21/2023          Plan     Plan of care Certification: 11/21/2023 to 1/21/2024.     Outpatient Physical Therapy 2 times weekly for 6 weeks o include the following interventions: Cervical/Lumbar Traction, Electrical Stimulation re-eval, dry needling, Gait Training, Iontophoresis (with ), Manual Therapy, Moist Heat/ Ice, Neuromuscular Re-ed, Patient Education, Self Care, Therapeutic Activities, and Therapeutic Exercise.      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      Physical therapist and physical therapy assistant(s) will met face to face to discuss patient's treatment plan and progress towards established goals. Pt will be seen by a physical therapist minimally every 6th visit or every 30 days    Bird Pringle PT

## 2023-12-01 NOTE — PROGRESS NOTES
[facility] [address]  [address]  Nurse Visit    Subjective:       Patient seen in clinic today. Patient ambulated to exam room using knee scooter for LLE with defender boot and ankle brace on LLE and Right Foot in tennis shoe with lift. Dressing is intact to LLE with no strike through drainage. No c/o pain to wound bed at present. Denies fever, chills, nausea, vomiting or diarrhea. Photo of Left Plantar Foot in media, closed blister noted. Dressing changed as ordered by Dr. Peterson verbally.      Assessment:          (Retired) Wound 04/08/22 1137 Diabetic Ulcer Left plantar;lateral Foot (Active)   04/08/22 1137    Pre-existing: Yes   Primary Wound Type: Diabetic ulc   Side: Left   Orientation: plantar;lateral   Location: Foot   Wound Number:    Ankle-Brachial Index:    Pulses:    Removal Indication and Assessment:    Wound Outcome:    (Retired) Wound Type:    (Retired) Wound Length (cm):    (Retired) Wound Width (cm):    (Retired) Depth (cm):    Wound Description (Comments):    Removal Indications:    Wound Image   12/01/23 1633   Wound WDL ex 12/01/23 1633   Dressing Appearance Intact;Dry 12/01/23 1633   Drainage Amount None 12/01/23 1633   Appearance Blistered 12/01/23 1633   Periwound Area Dry;Intact 12/01/23 1633   Wound Edges Approximated 12/01/23 1633   Care Cleansed with:;Antimicrobial agent;Sterile normal saline 12/01/23 1633   Dressing Applied;Collagen;Calcium alginate;Absorptive Pad;Foam;Cast padding 12/01/23 1633   Periwound Care Skin barrier film applied;Moisture barrier applied;Absorptive dressing applied 12/01/23 1633   Compression Tubular elasticized bandage 12/01/23 1633   Off Loading Football dressing;Off loading shoe 12/01/23 1633   Dressing Change Due 12/08/23 12/01/23 1633           Plan:     Orders Placed This Encounter   Procedures    Change dressing     Wound Dressing Orders     Dressing change frequency once a week   Remove old dressing   Cleanse or  irrigate with: Normal Saline   Protect periwound with: Gentian Violet hugging plantar wound bed  then Benzoin to where custom pad will be placed   Primary dressing: WOUND BASE: Cellerate to wound bed then custom felt pad to plantar foot (with opening to allow for drainage) then Aquacel Ag   Secondary dressing: Mextra, Abram foam long    Off-load/Compression: Open Toe Football dressing (Cast Padding x 2 rolls); Tubigrip single layer, size E. Darco shoe. Knee scooter for LLE to off-load     Return to clinic in 1 week.           Follow up in about 1 week (around 12/8/2023) for wound care.

## 2023-12-06 ENCOUNTER — CLINICAL SUPPORT (OUTPATIENT)
Dept: REHABILITATION | Facility: HOSPITAL | Age: 53
End: 2023-12-06
Payer: MEDICARE

## 2023-12-06 DIAGNOSIS — M21.6X2 EQUINUS DEFORMITY OF BOTH FEET: Primary | ICD-10-CM

## 2023-12-06 DIAGNOSIS — M21.6X1 EQUINUS DEFORMITY OF BOTH FEET: Primary | ICD-10-CM

## 2023-12-06 DIAGNOSIS — Z89.421 H/O AMPUTATION OF LESSER TOE, RIGHT: ICD-10-CM

## 2023-12-06 PROCEDURE — 97112 NEUROMUSCULAR REEDUCATION: CPT | Mod: CQ

## 2023-12-06 PROCEDURE — 97110 THERAPEUTIC EXERCISES: CPT | Mod: CQ

## 2023-12-06 NOTE — PROGRESS NOTES
[facility]   Physical Therapy Treatment Note      Name: Indio Ryder Jr.  Clinic Number: [id number]    Therapy Diagnosis:   Encounter Diagnoses   Name Primary?    Equinus deformity of both feet Yes    H/O amputation of lesser toe, right        Physician: Marivel Peterson DPM    Visit Date: 12/6/2023    Physician Orders: PT Eval and Treat   Medical Diagnosis from Referral: Z89.421 (ICD-10-CM) - H/O amputation of lesser toe, right M21.072 (ICD-10-CM) - Eversion deformity of foot, left M21.6X1,M21.6X2 (ICD-10-CM) - Equinus deformity of both feet  Evaluation Date: 11/21/2023  Authorization Period Expiration: 11/16/2024  Plan of Care Expiration: 1/21/2024  Visit # / Visits authorized: 4/20  PTA visit #: 1/5     Foto  Date  Score    #1/3 11/21/2023 44   #2/3       #3/3          Time In: 4:05 pm   Time Out: 5:05 pm  Total Appointment Time (timed & untimed codes): 60 minutes     Precautions: Standard      Subjective     Patient reports: that his left foot was wrapped a more heavily during his last wound care visit as his wound exhibited a small callus.  He was compliant with home exercise program.  Response to previous treatment: no adverse effects  Functional change: continues to ambulate with a scooter     Pain: 0/10  Location: left foot      Objective      Objective Measures updated at progress report unless specified.     Treatment     Indio received the treatments listed below:      therapeutic exercises to develop strength, endurance, ROM, flexibility, posture, and core stabilization for 40 minutes:  Patient education on nature of current condition and PHYSICAL THERAPY POC  Written HOME EXERCISE PROGRAM provided, reviewed, patient demo understanding   Arch domes in sitting: 3x10  (Initiate) marble lifts   Right/left towel scrunches: 2x15 - unable to today with wrap in place  R/L ankle eversion, blue band: 3x10  Right/left ankle DF, red band: 3x10  R/L towel swipes, slide board: 2x15   B  "ankle pumps, 5" hold each way: 3x10   LAQ 10# 3x10  Standing hip abduction 5# 3x10  Standing hip extension 5# 3x10  Nustep level 4.0 x5 minutes      manual therapy techniques: Joint mobilizations, Manual traction, Myofacial release, Soft tissue Mobilization, and Friction Massage for 00 minutes:       neuromuscular re-education activities to improve: Balance, Coordination, Kinesthetic, Sense, Proprioception, and Posture for 15 minutes:  2 point fitter board, fwd/bkw: x30/direction  1 point fitter board, cw/ccw circles: 2x15/direction  static standing with narrow SHER on Airex pad, intermittent upper extremity support, EO: 2x30 sec  static standing with narrow SHER on Airex pad, intermittent upper extremity support, EC: 2x30 sec  static standing in modified tandem stance on Airex pad, intermittent upper extremity support, EO: 2x30 sec/position  static standing in modified tandem stance on Airex pad, intermittent upper extremity support, EC: 2x30 sec/position  (Initiate) BAPS board multidirectional movements      therapeutic activities to improve functional performance for 00 minutes:  (Initiate) Recumbent bike      gait training to improve functional mobility and safety for 00 minutes:       Patient Education and Home Exercises       Education provided:   - Patient was educated on all therapeutic interventions performed during today's treatment visit.     Written Home Exercises Provided: Patient instructed to cont prior HEP. Exercises were reviewed and Indio was able to demonstrate them prior to the end of the session.  Indio demonstrated good  understanding of the education provided. See Electronic Medical Record under Patient Instructions for exercises provided during therapy sessions    Assessment   Patient continued to demonstrate significant mobility limitations in left foot, including: toes,mid foot, ankle. Patient presented with wound heavily wrapped as wound on left foot developed a callus and wrapping was " increased compared to the previous treatment visit.  No manual care and limited foot intrinsic strengthening performed due to this. Added additional LE strengthening in open chain as well as nu step today for ongoing strength mobility, endurance training. Patient responded well to treatment with no pain reported throughout treatment session. Will continue promote mobility and balance during subsequent therapeutic visits.      Patient prognosis is Good.     Patient will continue to benefit from skilled outpatient physical therapy to address the deficits listed in the problem list box on initial evaluation, provide pt/family education and to maximize pt's level of independence in the home and community environment.     Patient's spiritual, cultural and educational needs considered and pt agreeable to plan of care and goals.     Anticipated barriers to physical therapy: chronicity of current injury and multiple surgical interventions to tx area, multiple tx areas     Goals:   Short Terms Goals: 3 weeks     Goal  Progress  Date    (1)  Patient will be I with HOME EXERCISE PROGRAM  Initial, Not Met 11/21/2023   (2)  Patient will obtain 12 degrees L ankle DF to facilitate improved gait pattern during community mobility tasks   Initial, Not Met   11/21/2023   (3)   Patient will obtain 23 deg L ankle inversion to facilitate improved ability to change direction while moving quickly   Initial, Not Met   11/21/2023      Long Term Goals: 6 weeks     Goal  Progress  Date    (1)  Patient will obtain 12 deg L ankle eversion to facilitate improved ability to complete transfers into/out of vehicle Initial, Not Met  11/21/2023   (2)   Patient will obtain 4/5 L ankle inversion MMT to facilitate improved ability to step over obstacles in pathway   Initial, Not Met  11/21/2023   (3)   Patient will obtain 4/5 ankle eversion MMT to facilitate improved ability to complete community mobility tasks   Initial, Not Met  11/21/2023           Plan     Plan of care Certification: 11/21/2023 to 1/21/2024.     Outpatient Physical Therapy 2 times weekly for 6 weeks o include the following interventions: Cervical/Lumbar Traction, Electrical Stimulation re-eval, dry needling, Gait Training, Iontophoresis (with ), Manual Therapy, Moist Heat/ Ice, Neuromuscular Re-ed, Patient Education, Self Care, Therapeutic Activities, and Therapeutic Exercise.      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      Physical therapist and physical therapy assistant(s) will met face to face to discuss patient's treatment plan and progress towards established goals. Pt will be seen by a physical therapist minimally every 6th visit or every 30 days    Javid Griffiths, PTA

## 2023-12-08 ENCOUNTER — HOSPITAL ENCOUNTER (OUTPATIENT)
Dept: WOUND CARE | Facility: HOSPITAL | Age: 53
Discharge: HOME | End: 2023-12-08
Attending: PODIATRIST
Payer: MEDICARE

## 2023-12-08 ENCOUNTER — CLINICAL SUPPORT (OUTPATIENT)
Dept: REHABILITATION | Facility: HOSPITAL | Age: 53
End: 2023-12-08
Payer: MEDICARE

## 2023-12-08 VITALS — SYSTOLIC BLOOD PRESSURE: 153 MMHG | DIASTOLIC BLOOD PRESSURE: 86 MMHG | TEMPERATURE: 98 F | HEART RATE: 85 BPM

## 2023-12-08 DIAGNOSIS — M21.6X1 EQUINUS DEFORMITY OF BOTH FEET: ICD-10-CM

## 2023-12-08 DIAGNOSIS — M21.072 EVERSION DEFORMITY OF FOOT, LEFT: ICD-10-CM

## 2023-12-08 DIAGNOSIS — M21.6X1 EQUINUS DEFORMITY OF BOTH FEET: Primary | ICD-10-CM

## 2023-12-08 DIAGNOSIS — Z89.422 HX OF AMPUTATION OF LESSER TOE, LEFT: ICD-10-CM

## 2023-12-08 DIAGNOSIS — E11.49 TYPE II DIABETES MELLITUS WITH NEUROLOGICAL MANIFESTATIONS: Primary | ICD-10-CM

## 2023-12-08 DIAGNOSIS — L84 PRE-ULCERATIVE CALLUSES: ICD-10-CM

## 2023-12-08 DIAGNOSIS — Z89.421 H/O AMPUTATION OF LESSER TOE, RIGHT: ICD-10-CM

## 2023-12-08 DIAGNOSIS — M21.6X2 EQUINUS DEFORMITY OF BOTH FEET: ICD-10-CM

## 2023-12-08 DIAGNOSIS — M21.6X2 EQUINUS DEFORMITY OF BOTH FEET: Primary | ICD-10-CM

## 2023-12-08 PROCEDURE — 99213 PR OFFICE/OUTPT VISIT, EST, LEVL III, 20-29 MIN: ICD-10-PCS | Mod: ,,, | Performed by: PODIATRIST

## 2023-12-08 PROCEDURE — 97112 NEUROMUSCULAR REEDUCATION: CPT | Mod: CQ

## 2023-12-08 PROCEDURE — 11042 DBRDMT SUBQ TIS 1ST 20SQCM/<: CPT | Performed by: PODIATRIST

## 2023-12-08 PROCEDURE — 97110 THERAPEUTIC EXERCISES: CPT | Mod: CQ

## 2023-12-08 PROCEDURE — 99213 OFFICE O/P EST LOW 20 MIN: CPT | Mod: ,,, | Performed by: PODIATRIST

## 2023-12-08 NOTE — PROGRESS NOTES
[facility]                Progress Note    Subjective:       Patient ID: Indio Ryder Jr. is a 53 y.o. male.    Chief Complaint: Wound Care    Follow up wound care visit. Patient ambulated to exam room using knee scooter for LLE with defender boot on LLE and Right Foot in tennis shoe with lift. Dressing is intact to LLE with no strike through drainage. No c/o pain to wound bed at present. Denies fever, chills, nausea, vomiting or diarrhea.     Wound Check  There is no pain present.     Review of Systems   Constitutional:  Negative for appetite change, chills, fatigue and fever.   HENT:  Negative for hearing loss.    Eyes:  Negative for photophobia and visual disturbance.   Respiratory:  Negative for cough, chest tightness, shortness of breath and wheezing.    Cardiovascular:  Positive for leg swelling. Negative for chest pain and palpitations.   Gastrointestinal:  Negative for constipation, diarrhea, nausea and vomiting.   Endocrine: Negative for cold intolerance and heat intolerance.   Genitourinary:  Negative for flank pain.   Musculoskeletal:  Positive for gait problem and myalgias. Negative for neck pain and neck stiffness.   Skin:  Positive for wound. Negative for color change.   Neurological:  Positive for numbness. Negative for weakness, light-headedness and headaches.        + paresthesia    Psychiatric/Behavioral:  Negative for sleep disturbance.          Objective:        Physical Exam  Constitutional:       Appearance: He is well-developed.   Cardiovascular:      Comments: Dorsalis pedis and posterior tibial pulses are palpable Skin is atrophic, slightly hyperpigmented, and mildly edematous      Musculoskeletal:         General: No tenderness. Normal range of motion.      Comments: Muscle strength is 5/5 in all groups bilaterally.    S/p partial 5th ray amp b/l    S/p hallux amp right    Fat pad atrophy to heels and met heads bilateral       Skin:      General: Skin is warm and dry.      Coloration: Skin is not jaundiced, mottled or sallow.      Findings: Wound (see below) present. No abscess or ecchymosis.      Comments:        Neurological:      Mental Status: He is alert and oriented to person, place, and time.      Comments: [place] 5.07 monofilamant testing is diminished Kenji feet. Sharp/dull sensation diminished Bilaterally. Light touch absent Bilaterally.       Psychiatric:         Behavior: Behavior normal.         Vitals:    12/08/23 1117   BP: (!) 153/86   Pulse: 85   Temp: 97.5 °F (36.4 °C)       Assessment:           ICD-10-CM ICD-9-CM   1. Type II diabetes mellitus with neurological manifestations  E11.49 250.60   2. Pre-ulcerative calluses  L84 700   3. Hx of amputation of lesser toe, left  Z89.422 V49.72   4. Eversion deformity of foot, left  M21.072 736.79   5. Equinus deformity of both feet  M21.6X1 736.79    M21.6X2             (Retired) Wound 04/08/22 1137 Diabetic Ulcer Left plantar;lateral Foot (Active)   04/08/22 1137    Pre-existing: Yes   Primary Wound Type: Diabetic ulc   Side: Left   Orientation: plantar;lateral   Location: Foot   Wound Number:    Ankle-Brachial Index:    Pulses:    Removal Indication and Assessment:    Wound Outcome:    (Retired) Wound Type:    (Retired) Wound Length (cm):    (Retired) Wound Width (cm):    (Retired) Depth (cm):    Wound Description (Comments):    Removal Indications:    Wound Image    12/08/23 1249   Wound WDL ex 12/08/23 1249   Dressing Appearance Intact;Dry 12/08/23 1249   Drainage Amount None 12/08/23 1249   Appearance Blistered 12/08/23 1249   Tissue loss description Partial thickness 12/08/23 1249   Periwound Area Dry;Intact 12/08/23 1249   Wound Edges Callused 12/08/23 1249   Wound Length (cm) 0 cm 12/08/23 1249   Wound Width (cm) 0 cm 12/08/23 1249   Wound Depth (cm) 0 cm 12/08/23 1249   Wound Volume (cm^3) 0 cm^3 12/08/23 1249   Wound Surface Area (cm^2) 0 cm^2 12/08/23 1249   Tunneling  (depth (cm)/location) 0 12/08/23 1249   Undermining (depth (cm)/location) 0 12/08/23 1249   Care Cleansed with:;Antimicrobial agent;Sterile normal saline;Debrided 12/08/23 1249   Dressing Applied;Collagen;Calcium alginate;Absorptive Pad;Foam;Cast padding;Tubular bandage 12/08/23 1249   Periwound Care Skin barrier film applied;Absorptive dressing applied 12/08/23 1249   Compression Tubular elasticized bandage 12/08/23 1249   Off Loading Football dressing;Off loading shoe 12/08/23 1249   Dressing Change Due 12/15/23 12/08/23 1249             Plan:              Education about the prevention of limb loss.    Discussed wound healing cycle, skin integrity, ways to care for skin.Counseled patient on the effects of biomechanical pressure,  PVD, and blood glucose on healing. He verbalizes understanding that it can increase the chances of delayed healing and this prolonged exposure leads to infection or progression of infection which subsequently can result in loss of limb.    The wound is cleansed of foreign material as much as possible and the base inspected for bone or abscess.      Left Plantar Foot has thick callus with old blood blister, vulnerable for reopening.  AFO pending    Currently in PT    Long-term goals include keeping the wound healed by good offloading and medical management under the direction of internist.      Procedures    Orders Placed This Encounter   Procedures    Change dressing     Wound Dressing Orders     Dressing change frequency once a week   Remove old dressing   Cleanse or irrigate with: Normal Saline   Protect periwound with: Gentian Violet hugging plantar wound bed  then Benzoin to where custom pad will be placed   Primary dressing: WOUND BASE: Cellerate to wound bed then custom felt pad to plantar foot (with opening to allow for drainage) then Aquacel Ag   Secondary dressing: Mextra, Abram foam long    Off-load/Compression: Open Toe Football dressing (Cast Padding x 2 rolls); Tubigrip  single layer, size E. Defender Boot. Knee scooter for LLE to off-load     Return to clinic in 1 week.          Follow up in about 1 week (around 12/15/2023) for wound care.

## 2023-12-08 NOTE — PROGRESS NOTES
[facility]   Physical Therapy Treatment Note      Name: Indio Ryder Jr.  Clinic Number: [id number]    Therapy Diagnosis:   Encounter Diagnoses   Name Primary?    Equinus deformity of both feet Yes    H/O amputation of lesser toe, right        Physician: Marivel Peterson DPM    Visit Date: 12/8/2023    Physician Orders: PT Eval and Treat   Medical Diagnosis from Referral: Z89.421 (ICD-10-CM) - H/O amputation of lesser toe, right M21.072 (ICD-10-CM) - Eversion deformity of foot, left M21.6X1,M21.6X2 (ICD-10-CM) - Equinus deformity of both feet  Evaluation Date: 11/21/2023  Authorization Period Expiration: 11/16/2024  Plan of Care Expiration: 1/21/2024  Visit # / Visits authorized: 5/20  PTA visit #: 2/5     Foto  Date  Score    #1/3 11/21/2023 44   #2/3       #3/3          Time In: 2:15 pm   Time Out: 3:15 pm  Total Appointment Time (timed & untimed codes): 60 minutes     Precautions: Standard      Subjective     Patient reports: that his left foot was rewrapped heavily during today's wound care visit as the wound specialist did not want to take any changes.   He was compliant with home exercise program.  Response to previous treatment: no adverse effects  Functional change: continues to ambulate with a scooter     Pain: 0/10  Location: left foot      Objective      Objective Measures updated at progress report unless specified.     Treatment     Indio received the treatments listed below:      therapeutic exercises to develop strength, endurance, ROM, flexibility, posture, and core stabilization for 43 minutes:  Patient education on nature of current condition and PHYSICAL THERAPY POC  Written HOME EXERCISE PROGRAM provided, reviewed, patient demo understanding   Arch domes in sitting: 3x10  (Initiate) marble lifts   Right/left towel scrunches: 2x15 - unable to today with wrap in place  R/L ankle eversion, yellowband: 3x10  Right/left ankle DF, red band: 3x10  R/L towel swipes, slide  "board: 2x15   B ankle pumps, 5" hold each way: 3x10   LAQ 10# 3x10  Standing hip abduction 5# 3x10  Standing hip extension 5# 3x10  Nustep level 4.0 x5 minutes next visit     manual therapy techniques: Joint mobilizations, Manual traction, Myofacial release, Soft tissue Mobilization, and Friction Massage for 00 minutes:       neuromuscular re-education activities to improve: Balance, Coordination, Kinesthetic, Sense, Proprioception, and Posture for 17 minutes:  2 point fitter board, fwd/bkw: x30/direction  1 point fitter board, cw/ccw circles: 2x15/direction  static standing with narrow SHER on Airex pad, intermittent upper extremity support, EO: 2x30 sec  static standing with narrow SHER on Airex pad, intermittent upper extremity support, EC: 2x30 sec  static standing in modified tandem stance on Airex pad, intermittent upper extremity support, EO: 2x30 sec/position  static standing in modified tandem stance on Airex pad, intermittent upper extremity support, EC: 2x30 sec/position  (Initiate) BAPS board multidirectional movements      therapeutic activities to improve functional performance for 00 minutes:  (Initiate) Recumbent bike      gait training to improve functional mobility and safety for 00 minutes:       Patient Education and Home Exercises       Education provided:   - Patient was educated on all therapeutic interventions performed during today's treatment visit.     Written Home Exercises Provided: Patient instructed to cont prior HEP. Exercises were reviewed and Indio was able to demonstrate them prior to the end of the session.  Indio demonstrated good  understanding of the education provided. See Electronic Medical Record under Patient Instructions for exercises provided during therapy sessions    Assessment   Patient continued to demonstrate significant mobility limitations in left foot, including: toes,mid foot, ankle. Patient presented with wound heavily wrapped as wound on left foot is not " healed. No manual care and limited foot intrinsic strengthening performed due to this. Patient responded well to treatment with no pain reported throughout treatment session. Will continue promote mobility and balance during subsequent therapeutic visits.      Patient prognosis is Good.     Patient will continue to benefit from skilled outpatient physical therapy to address the deficits listed in the problem list box on initial evaluation, provide pt/family education and to maximize pt's level of independence in the home and community environment.     Patient's spiritual, cultural and educational needs considered and pt agreeable to plan of care and goals.     Anticipated barriers to physical therapy: chronicity of current injury and multiple surgical interventions to tx area, multiple tx areas     Goals:   Short Terms Goals: 3 weeks     Goal  Progress  Date    (1)  Patient will be I with HOME EXERCISE PROGRAM  Initial, Not Met 11/21/2023   (2)  Patient will obtain 12 degrees L ankle DF to facilitate improved gait pattern during community mobility tasks   Initial, Not Met   11/21/2023   (3)   Patient will obtain 23 deg L ankle inversion to facilitate improved ability to change direction while moving quickly   Initial, Not Met   11/21/2023      Long Term Goals: 6 weeks     Goal  Progress  Date    (1)  Patient will obtain 12 deg L ankle eversion to facilitate improved ability to complete transfers into/out of vehicle Initial, Not Met  11/21/2023   (2)   Patient will obtain 4/5 L ankle inversion MMT to facilitate improved ability to step over obstacles in pathway   Initial, Not Met  11/21/2023   (3)   Patient will obtain 4/5 ankle eversion MMT to facilitate improved ability to complete community mobility tasks   Initial, Not Met  11/21/2023          Plan     Plan of care Certification: 11/21/2023 to 1/21/2024.     Outpatient Physical Therapy 2 times weekly for 6 weeks o include the following interventions:  Cervical/Lumbar Traction, Electrical Stimulation re-eval, dry needling, Gait Training, Iontophoresis (with ), Manual Therapy, Moist Heat/ Ice, Neuromuscular Re-ed, Patient Education, Self Care, Therapeutic Activities, and Therapeutic Exercise.      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      Physical therapist and physical therapy assistant(s) will met face to face to discuss patient's treatment plan and progress towards established goals. Pt will be seen by a physical therapist minimally every 6th visit or every 30 days    Javid Griffiths, PTA

## 2023-12-13 ENCOUNTER — CLINICAL SUPPORT (OUTPATIENT)
Dept: REHABILITATION | Facility: HOSPITAL | Age: 53
End: 2023-12-13
Payer: MEDICARE

## 2023-12-13 DIAGNOSIS — M21.6X2 EQUINUS DEFORMITY OF BOTH FEET: Primary | ICD-10-CM

## 2023-12-13 DIAGNOSIS — Z89.421 H/O AMPUTATION OF LESSER TOE, RIGHT: ICD-10-CM

## 2023-12-13 DIAGNOSIS — M21.6X1 EQUINUS DEFORMITY OF BOTH FEET: Primary | ICD-10-CM

## 2023-12-13 PROCEDURE — 97110 THERAPEUTIC EXERCISES: CPT | Mod: CQ

## 2023-12-13 PROCEDURE — 97112 NEUROMUSCULAR REEDUCATION: CPT | Mod: CQ

## 2023-12-13 NOTE — PROGRESS NOTES
"[facility]   Physical Therapy Treatment Note      Name: Indio Ryder Jr.  Clinic Number: [id number]    Therapy Diagnosis:   Encounter Diagnoses   Name Primary?    Equinus deformity of both feet Yes    H/O amputation of lesser toe, right        Physician: Marivel Peterson DPM    Visit Date: 12/13/2023    Physician Orders: PT Eval and Treat   Medical Diagnosis from Referral: Z89.421 (ICD-10-CM) - H/O amputation of lesser toe, right M21.072 (ICD-10-CM) - Eversion deformity of foot, left M21.6X1,M21.6X2 (ICD-10-CM) - Equinus deformity of both feet  Evaluation Date: 11/21/2023  Authorization Period Expiration: 11/16/2024  Plan of Care Expiration: 1/21/2024  Visit # / Visits authorized: 6/20  PTA visit #: 3/5     Foto  Date  Score    #1/3 11/21/2023 44   #2/3       #3/3          Time In: 2:20 pm   Time Out: 3:20 pm  Total Appointment Time (timed & untimed codes): 60 minutes     Precautions: Standard      Subjective     Patient reports: that his right ankle AROM has improved significantly since start of care.  He was compliant with home exercise program.  Response to previous treatment: no adverse effects  Functional change: continues to ambulate with a scooter     Pain: 0/10  Location: left foot      Objective      Objective Measures updated at progress report unless specified.     Treatment     Indio received the treatments listed below:      therapeutic exercises to develop strength, endurance, ROM, flexibility, posture, and core stabilization for 40 minutes:  Patient education on nature of current condition and PHYSICAL THERAPY POC  Written HOME EXERCISE PROGRAM provided, reviewed, patient demo understanding   Arch domes in sitting: 3x10  (Initiate) marble lifts   Right/left towel scrunches: 2x15 - unable to today with wrap in place  R/L ankle eversion, yellowband: 3x10  Right/left ankle DF, red band: 3x10  R/L towel swipes, slide board: 2x15   B ankle pumps, 5" hold each way: 3x10   LAQ " 10# 3x10  Standing hip abduction 5# 3x10  Standing hip extension 5# 3x10  Nustep level 4.0 x10 minutes      manual therapy techniques: Joint mobilizations, Manual traction, Myofacial release, Soft tissue Mobilization, and Friction Massage for 00 minutes:       neuromuscular re-education activities to improve: Balance, Coordination, Kinesthetic, Sense, Proprioception, and Posture for 20 minutes:  2 point fitter board, fwd/bkw: x30/direction  1 point fitter board, cw/ccw circles: 2x15/direction  static standing with narrow SHER on Airex pad, intermittent upper extremity support, EO: 2x30 sec  static standing with narrow SHER on Airex pad, intermittent upper extremity support, EC: 2x30 sec  static standing in modified tandem stance on Airex pad, intermittent upper extremity support, EO: 2x30 sec/position  static standing in modified tandem stance on Airex pad, intermittent upper extremity support, EC: 2x30 sec/position  (Initiate) BAPS board multidirectional movements      therapeutic activities to improve functional performance for 00 minutes:  (Initiate) Recumbent bike      gait training to improve functional mobility and safety for 00 minutes:       Patient Education and Home Exercises       Education provided:   - Patient was educated on all therapeutic interventions performed during today's treatment visit.     Written Home Exercises Provided: Patient instructed to cont prior HEP. Exercises were reviewed and Indio was able to demonstrate them prior to the end of the session.  Indio demonstrated good  understanding of the education provided. See Electronic Medical Record under Patient Instructions for exercises provided during therapy sessions    Assessment   Patient continued to demonstrate significant mobility limitations in left foot, including: toes,mid foot, ankle. Nevertheless, patient endorsed a significant increased in right ankle AROM since start of care. Patient presented with wound heavily wrapped as  wound on left foot is not healed. No manual care and limited foot intrinsic strengthening performed due to this. Patient responded well to treatment with no pain reported throughout treatment session. Will continue promote mobility and balance during subsequent therapeutic visits.      Patient prognosis is Good.     Patient will continue to benefit from skilled outpatient physical therapy to address the deficits listed in the problem list box on initial evaluation, provide pt/family education and to maximize pt's level of independence in the home and community environment.     Patient's spiritual, cultural and educational needs considered and pt agreeable to plan of care and goals.     Anticipated barriers to physical therapy: chronicity of current injury and multiple surgical interventions to tx area, multiple tx areas     Goals:   Short Terms Goals: 3 weeks     Goal  Progress  Date    (1)  Patient will be I with HOME EXERCISE PROGRAM  Initial, Not Met 11/21/2023   (2)  Patient will obtain 12 degrees L ankle DF to facilitate improved gait pattern during community mobility tasks   Initial, Not Met   11/21/2023   (3)   Patient will obtain 23 deg L ankle inversion to facilitate improved ability to change direction while moving quickly   Initial, Not Met   11/21/2023      Long Term Goals: 6 weeks     Goal  Progress  Date    (1)  Patient will obtain 12 deg L ankle eversion to facilitate improved ability to complete transfers into/out of vehicle Initial, Not Met  11/21/2023   (2)   Patient will obtain 4/5 L ankle inversion MMT to facilitate improved ability to step over obstacles in pathway   Initial, Not Met  11/21/2023   (3)   Patient will obtain 4/5 ankle eversion MMT to facilitate improved ability to complete community mobility tasks   Initial, Not Met  11/21/2023          Plan     Plan of care Certification: 11/21/2023 to 1/21/2024.     Outpatient Physical Therapy 2 times weekly for 6 weeks o include the following  interventions: Cervical/Lumbar Traction, Electrical Stimulation re-eval, dry needling, Gait Training, Iontophoresis (with ), Manual Therapy, Moist Heat/ Ice, Neuromuscular Re-ed, Patient Education, Self Care, Therapeutic Activities, and Therapeutic Exercise.      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      Physical therapist and physical therapy assistant(s) will met face to face to discuss patient's treatment plan and progress towards established goals. Pt will be seen by a physical therapist minimally every 6th visit or every 30 days    Javid Griffiths, PTA     12/14/2023

## 2023-12-15 ENCOUNTER — HOSPITAL ENCOUNTER (OUTPATIENT)
Dept: WOUND CARE | Facility: HOSPITAL | Age: 53
Discharge: HOME | End: 2023-12-15
Attending: PODIATRIST
Payer: MEDICARE

## 2023-12-15 VITALS
BODY MASS INDEX: 29.16 KG/M2 | DIASTOLIC BLOOD PRESSURE: 62 MMHG | SYSTOLIC BLOOD PRESSURE: 132 MMHG | TEMPERATURE: 98 F | HEART RATE: 81 BPM | HEIGHT: 73 IN | WEIGHT: 220 LBS

## 2023-12-15 DIAGNOSIS — L84 PRE-ULCERATIVE CALLUSES: ICD-10-CM

## 2023-12-15 DIAGNOSIS — E11.49 TYPE II DIABETES MELLITUS WITH NEUROLOGICAL MANIFESTATIONS: Primary | ICD-10-CM

## 2023-12-15 DIAGNOSIS — Z89.421 H/O AMPUTATION OF LESSER TOE, RIGHT: ICD-10-CM

## 2023-12-15 PROCEDURE — 99213 PR OFFICE/OUTPT VISIT, EST, LEVL III, 20-29 MIN: ICD-10-PCS | Mod: ,,, | Performed by: PODIATRIST

## 2023-12-15 PROCEDURE — 99213 OFFICE O/P EST LOW 20 MIN: CPT | Mod: ,,, | Performed by: PODIATRIST

## 2023-12-15 PROCEDURE — 99213 OFFICE O/P EST LOW 20 MIN: CPT | Performed by: PODIATRIST

## 2023-12-15 NOTE — PROGRESS NOTES
[facility]                Progress Note    Subjective:       Patient ID: Indio Ryder Jr. is a 53 y.o. male.    Chief Complaint: Wound Check, Dressing Change, and Diabetic Foot Ulcer    Follow up wound care visit. Patient ambulated to exam room using knee scooter for LLE with defender boot on LLE and Right Foot in tennis shoe with lift. Dressing is intact to LLE, Tubigrip is well place, no strike through drainage. No c/o pain to wound bed at present. Denies fever, chills, nausea, vomiting or diarrhea.     Wound Check  There is no pain present.     Review of Systems   Constitutional:  Negative for appetite change, chills, fatigue and fever.   HENT:  Negative for hearing loss.    Eyes:  Negative for photophobia and visual disturbance.   Respiratory:  Negative for cough, chest tightness, shortness of breath and wheezing.    Cardiovascular:  Positive for leg swelling. Negative for chest pain and palpitations.   Gastrointestinal:  Negative for constipation, diarrhea, nausea and vomiting.   Endocrine: Negative for cold intolerance and heat intolerance.   Genitourinary:  Negative for flank pain.   Musculoskeletal:  Positive for gait problem and myalgias. Negative for neck pain and neck stiffness.   Skin:  Positive for wound. Negative for color change.   Neurological:  Positive for numbness. Negative for weakness, light-headedness and headaches.        + paresthesia    Psychiatric/Behavioral:  Negative for sleep disturbance.          Objective:        Physical Exam  Constitutional:       Appearance: He is well-developed.   Cardiovascular:      Comments: Dorsalis pedis and posterior tibial pulses are palpable Skin is atrophic, slightly hyperpigmented, and mildly edematous      Musculoskeletal:         General: No tenderness. Normal range of motion.      Comments: Muscle strength is 5/5 in all groups bilaterally.    S/p partial 5th ray amp b/l    S/p hallux amp right    Fat  pad atrophy to heels and met heads bilateral       Skin:     General: Skin is warm and dry.      Coloration: Skin is not jaundiced, mottled or sallow.      Findings: Wound (see below) present. No abscess or ecchymosis.      Comments:        Neurological:      Mental Status: He is alert and oriented to person, place, and time.      Comments: [place] 5.07 monofilamant testing is diminished Kenji feet. Sharp/dull sensation diminished Bilaterally. Light touch absent Bilaterally.       Psychiatric:         Behavior: Behavior normal.         Vitals:    12/15/23 1042   BP: 132/62   Pulse: 81   Temp: 97.8 °F (36.6 °C)       Assessment:           ICD-10-CM ICD-9-CM   1. Type II diabetes mellitus with neurological manifestations  E11.49 250.60   2. H/O amputation of lesser toe, right  Z89.421 V49.72   3. Pre-ulcerative calluses  L84 700            (Retired) Wound 04/08/22 1137 Diabetic Ulcer Left plantar;lateral Foot (Active)   04/08/22 1137    Pre-existing: Yes   Primary Wound Type: Diabetic ulc   Side: Left   Orientation: plantar;lateral   Location: Foot   Wound Number:    Ankle-Brachial Index:    Pulses:    Removal Indication and Assessment:    Wound Outcome:    (Retired) Wound Type:    (Retired) Wound Length (cm):    (Retired) Wound Width (cm):    (Retired) Depth (cm):    Wound Description (Comments):    Removal Indications:    Wound Image   12/15/23 1651   Wound WDL ex 12/15/23 1651   Dressing Appearance Intact;Dry 12/15/23 1651   Drainage Amount None 12/15/23 1651   Appearance Epithelialization 12/15/23 1651   Tissue loss description Not applicable 12/15/23 1651   Periwound Area Pale white;Dry 12/15/23 1651   Wound Edges Defined 12/15/23 1651   Wound Length (cm) 0 cm 12/15/23 1651   Wound Width (cm) 0 cm 12/15/23 1651   Wound Depth (cm) 0 cm 12/15/23 1651   Wound Volume (cm^3) 0 cm^3 12/15/23 1651   Wound Surface Area (cm^2) 0 cm^2 12/15/23 1651   Tunneling (depth (cm)/location) 0 12/15/23 1651   Undermining  (depth (cm)/location) 0 12/15/23 1651   Care Cleansed with:;Antimicrobial agent;Sterile normal saline 12/15/23 1651   Dressing Applied;Collagen;Calcium alginate;Absorptive Pad;Foam;Tubular bandage 12/15/23 1651   Periwound Care Moisture barrier applied;Absorptive dressing applied 12/15/23 1651   Compression Tubular elasticized bandage 12/15/23 1651   Off Loading Football dressing;Off loading shoe 12/15/23 1651   Dressing Change Due 12/20/23 12/15/23 1651             Plan:              Education about the prevention of limb loss.    Discussed wound healing cycle, skin integrity, ways to care for skin.Counseled patient on the effects of biomechanical pressure,  PVD, and blood glucose on healing. He verbalizes understanding that it can increase the chances of delayed healing and this prolonged exposure leads to infection or progression of infection which subsequently can result in loss of limb.    The wound is cleansed of foreign material as much as possible and the base inspected for bone or abscess.      Left Plantar Foot has thin epithelization layer covering previous wound bed. Protective wrap continued. RTC for NV on Wednesday and then see DPM in 2 weeks.    Currently in PT    Long-term goals include keeping the wound healed by good offloading and medical management under the direction of internist.      Procedures    Orders Placed This Encounter   Procedures    Change dressing     Wound Dressing Orders    Dressing change frequency once a week  Remove old dressing  Cleanse or irrigate with: Normal Saline  Protect periwound with: Gentian Violet hugging plantar wound bed then Benzoin to where custom pad will be placed  Primary dressing: WOUND BASE: Cellerate to wound bed then custom felt pad to plantar foot (with opening to allow for drainage) then Kaltostat  Secondary dressing: Mextra, Abram foam long    Off-load/Compression: Open Toe Football dressing (Cast Padding x 2 rolls); Tubigrip single layer, size E.  Defender Boot. Knee scooter for LLE to off-load    Return to clinic in 1 week, change at nurse visit.          Follow up in about 5 days (around 12/20/2023) for wound care.

## 2023-12-20 ENCOUNTER — HOSPITAL ENCOUNTER (OUTPATIENT)
Dept: WOUND CARE | Facility: HOSPITAL | Age: 53
Discharge: HOME | End: 2023-12-20
Payer: MEDICARE

## 2023-12-20 VITALS — TEMPERATURE: 97 F | DIASTOLIC BLOOD PRESSURE: 84 MMHG | HEART RATE: 98 BPM | SYSTOLIC BLOOD PRESSURE: 142 MMHG

## 2023-12-20 DIAGNOSIS — E11.49 TYPE II DIABETES MELLITUS WITH NEUROLOGICAL MANIFESTATIONS: Primary | ICD-10-CM

## 2023-12-20 DIAGNOSIS — L84 PRE-ULCERATIVE CALLUSES: ICD-10-CM

## 2023-12-20 DIAGNOSIS — Z89.422 HX OF AMPUTATION OF LESSER TOE, LEFT: ICD-10-CM

## 2023-12-20 PROCEDURE — 99213 OFFICE O/P EST LOW 20 MIN: CPT

## 2023-12-20 NOTE — PROGRESS NOTES
Subjective:      Patient ID: Indio Ryder Jr. is a 53 y.o. male.    Chief Complaint: Wound Care (LEFT FOOT ) and Dressing Change      Indio is a 53 y.o. male who presents to the clinic for evaluation and treatment of high risk feet. 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), Colon adenocarcinoma (04/12/2022), Diabetic ketoacidosis without coma associated with type 2 diabetes mellitus (05/30/2017), Diabetic ulcer of right foot associated with type 2 diabetes mellitus (06/03/2015), Disorder of kidney and ureter, Essential hypertension (06/06/2013), Group B streptococcal infection (01/13/2017), Mixed hyperlipidemia (08/12/2014), Septic arthritis of left foot (04/28/2021), Shoulder impingement (08/12/2014), Subacute osteomyelitis of right foot (01/12/2017), Traumatic amputation of fifth toe of right foot (07/02/2015), and Type 2 diabetes mellitus with diabetic neuropathy, with long-term current use of insulin (05/03/2016). The patient's chief complaint is   Follow up heel ulcers to bilateral feet. Currently managed by Dr Peterson.   This patient has documented high risk feet requiring routine maintenance secondary to diabetes mellitis and those secondary complications of diabetes, as mentioned..      PCP: Lorena Thakur MD    Date Last Seen by PCP:   Chief Complaint   Patient presents with    Wound Care     LEFT FOOT     Dressing Change       Hemoglobin A1C   Date Value Ref Range Status   01/13/2023 10.1 (H) 4.0 - 5.6 % Final     Comment:     ADA Screening Guidelines:  5.7-6.4%  Consistent with prediabetes  >or=6.5%  Consistent with diabetes    High levels of fetal hemoglobin interfere with the HbA1C  assay. Heterozygous hemoglobin variants (HbS, HgC, etc)do  not significantly interfere with this assay.   However, presence of multiple variants may affect accuracy.     11/22/2022 9.8 (H) 4.0 - 5.6 % Final     Comment:     ADA Screening Guidelines:  5.7-6.4%  Consistent with  prediabetes  >or=6.5%  Consistent with diabetes    High levels of fetal hemoglobin interfere with the HbA1C  assay. Heterozygous hemoglobin variants (HbS, HgC, etc)do  not significantly interfere with this assay.   However, presence of multiple variants may affect accuracy.     07/01/2022 13.3 (H) 4.0 - 5.6 % Final     Comment:     ADA Screening Guidelines:  5.7-6.4%  Consistent with prediabetes  >or=6.5%  Consistent with diabetes    High levels of fetal hemoglobin interfere with the HbA1C  assay. Heterozygous hemoglobin variants (HbS, HgC, etc)do  not significantly interfere with this assay.   However, presence of multiple variants may affect accuracy.         Review of Systems   Constitutional: Negative for chills, decreased appetite and fever.   Cardiovascular:  Positive for leg swelling (chronic).   Skin:  Positive for color change, dry skin, nail changes and poor wound healing. Negative for flushing and itching.   Musculoskeletal:  Negative for arthritis, back pain, joint pain, joint swelling and myalgias.   Gastrointestinal:  Negative for nausea and vomiting.   Neurological:  Positive for numbness, paresthesias and sensory change. Negative for loss of balance.           Patient Active Problem List   Diagnosis    Essential hypertension    Mixed hyperlipidemia    Type 2 diabetes mellitus with hyperglycemia, with long-term current use of insulin    Actinomyces infection    Hypokalemia    Neck pain    Diabetic ulcer of right midfoot associated with type 2 diabetes mellitus, with necrosis of bone    Diabetic ulcer of left midfoot associated with type 2 diabetes mellitus, with bone involvement without evidence of necrosis    Hypertensive retinopathy, bilateral    Subacute osteomyelitis of left foot    Allergic reaction due to antibacterial drug    Chronic left shoulder pain    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 edema, with long-term current  "use of insulin    Diabetic ulcer of left foot    Septic arthritis of left foot    Acute on chronic osteomyelitis    History of colon cancer    Diabetic ulcer of right foot associated with type 2 diabetes mellitus, with fat layer exposed    Chronic osteomyelitis of right foot    Pre-ulcerative calluses    Eversion deformity of foot, left    Hx of amputation of lesser toe, left    Type II diabetes mellitus with neurological manifestations    Chronic osteomyelitis of left foot    Equinus deformity of both feet    H/O amputation of lesser toe, right    Right great toe amputee       Current Outpatient Medications on File Prior to Visit   Medication Sig Dispense Refill    amoxicillin-clavulanate 875-125mg (AUGMENTIN) 875-125 mg per tablet Take 1 tablet by mouth 2 (two) times a day. 60 tablet 5    atorvastatin (LIPITOR) 80 MG tablet Take 1 tablet (80 mg total) by mouth once daily. 90 tablet 3    empagliflozin (JARDIANCE) 25 mg tablet Take 1 tablet (25 mg total) by mouth once daily. 30 tablet 8    insulin aspart U-100 (NOVOLOG) 100 unit/mL (3 mL) InPn pen Inject if sugar is > 180 up to 4x a day , 180-230+2, 231-280+4, 281-330+6, 331-380+8, >380+10. Max daily 40 units 15 mL 8    insulin detemir U-100 (LEVEMIR FLEXTOUCH) 100 unit/mL (3 mL) SubQ InPn pen Inject 26 Units into the skin every evening. 15 mL 6    ONETOUCH DELICA LANCETS 33 gauge Misc       ONETOUCH ULTRA2 kit       pen needle, diabetic (BD SASCHA 2ND GEN PEN NEEDLE) 32 gauge x 5/32" Ndle Use 4 times a day 400 each 3     No current facility-administered medications on file prior to visit.       Review of patient's allergies indicates:  No Known Allergies      Past Surgical History:   Procedure Laterality Date    COLONOSCOPY Right 1/19/2022    Procedure: COLONOSCOPY;  Surgeon: Tj Haile MD;  Location: [facility] ([address] [place] [address]);  Service: Endoscopy;  Laterality: Right;  previous order un-usable/poor prep 1/18/22  RAPID COVID test arrival 12:20  instructions handed to pt- " sm    COLONOSCOPY N/A 3/21/2022    Procedure: COLONOSCOPY;  Surgeon: Sheng Haque MD;  Location: [facility] END[facility] (2ND FLR);  Service: Endoscopy;  Laterality: N/A;  Colonoscopy with AES for hepatix flexure polyp removal, 2nd floor  Pt is fully vaccinated-DS  Pt prescribed Plavix by Dr. Stahl in Jan. 2022, however pt states that he never started taking it-DS  3/16/22-Instructions sent via portal-DS    COLONOSCOPY N/A 9/13/2023    Procedure: COLONOSCOPY;  Surgeon: Erik Stout MD;  Location: [facility] END[facility] (4TH FLR);  Service: Colon and Rectal;  Laterality: N/A;  Referred by Dr. Stout, suprilsa, [facility], portal -ml    DEBRIDEMENT OF FOOT Left 7/14/2019    Procedure: DEBRIDEMENT, FOOT, with left 5th ray amputation;  Surgeon: Sis Hickman DPM;  Location: [facility] OR 2ND FLR;  Service: Podiatry;  Laterality: Left;    DEBRIDEMENT OF FOOT Left 7/17/2019    Procedure: DEBRIDEMENT, FOOT with leftt 5th ray partial amputation with Neox Graft;  Surgeon: Mai Burrell DPM;  Location: [facility] OR 2ND FLR;  Service: Podiatry;  Laterality: Left;  t    DEBRIDEMENT OF FOOT Bilateral 4/29/2021    Procedure: DEBRIDEMENT, FOOT;  Surgeon: Mai Burrell DPM;  Location: [facility] OR 1ST FLR;  Service: Podiatry;  Laterality: Bilateral;  Graft application    DEBRIDEMENT OF FOOT Left 7/31/2023    Procedure: DEBRIDEMENT, FOOT;  Surgeon: Marivel Peterson DPM;  Location: [facility] OR 2ND FLR;  Service: Podiatry;  Laterality: Left;    TOE AMPUTATION Right 06/05/2015    TOE AMPUTATION Right 01/19/2017    XI ROBOTIC COLECTOMY, RIGHT N/A 4/25/2022    Procedure: XI ROBOTIC COLECTOMY, RIGHT (lithotomy, eras low);  Surgeon: Erik Stout MD;  Location: [facility] OR 2ND FLR;  Service: Colon and Rectal;  Laterality: N/A;  Paruch to [place]    XI ROBOTIC COLECTOMY, RIGHT  4/25/2022    Procedure: ;  Surgeon: Erik Stout MD;  Location: [facility] OR 2ND FLR;  Service: Colon and Rectal;;       Family History   Adopted: Yes   Problem Relation Age of  Onset    Hypertension Mother     Cancer Mother         breast    Diabetes Mother     Hypertension Father     Cataracts Father     Heart disease Father         mi    Diabetes Sister     No Known Problems Brother     Heart disease Sister         MI    No Known Problems Sister     Hypertension Maternal Aunt     No Known Problems Maternal Uncle     No Known Problems Paternal Aunt     No Known Problems Paternal Uncle     No Known Problems Maternal Grandmother     No Known Problems Maternal Grandfather     No Known Problems Paternal Grandmother     No Known Problems Paternal Grandfather     Amblyopia Neg Hx     Blindness Neg Hx     Glaucoma Neg Hx     Macular degeneration Neg Hx     Retinal detachment Neg Hx     Strabismus Neg Hx     Stroke Neg Hx     Thyroid disease Neg Hx        Social History     Socioeconomic History    Marital status: Single    Number of children: 0   Occupational History    Occupation: Retired     Employer: [employer]   Tobacco Use    Smoking status: Never    Smokeless tobacco: Never   Substance and Sexual Activity    Alcohol use: No    Drug use: No    Sexual activity: Yes     Partners: Female     Birth control/protection: Condom     Social Determinants of Health     Financial Resource Strain: High Risk (7/13/2023)    Overall Financial Resource Strain (CARDIA)     Difficulty of Paying Living Expenses: Very hard   Food Insecurity: No Food Insecurity (7/13/2023)    Hunger Vital Sign     Worried About Running Out of Food in the Last Year: Never true     Ran Out of Food in the Last Year: Never true   Transportation Needs: No Transportation Needs (7/13/2023)    PRAPARE - Transportation     Lack of Transportation (Medical): No     Lack of Transportation (Non-Medical): No   Physical Activity: Inactive (7/13/2023)    Exercise Vital Sign     Days of Exercise per Week: 0 days     Minutes of Exercise per Session: 0 min   Stress: No Stress Concern Present (7/13/2023)    [place] [employer]  "Health - Occupational Stress Questionnaire     Feeling of Stress : Not at all   Social Connections: Moderately Isolated (7/13/2023)    Social Connection and Isolation Panel [NHANES]     Frequency of Communication with Friends and Family: More than three times a week     Frequency of Social Gatherings with Friends and Family: More than three times a week     Attends Sikhism Services: More than 4 times per year     Active Member of Clubs or Organizations: No     Attends Club or Organization Meetings: Never     Marital Status:    Housing Stability: Low Risk  (7/13/2023)    Housing Stability Vital Sign     Unable to Pay for Housing in the Last Year: No     Number of Places Lived in the Last Year: 1     Unstable Housing in the Last Year: No               Objective:      Vitals:    11/28/23 1422   BP: 113/76   Pulse: 101   Resp: 18   Weight: 99.8 kg (220 lb)   Height: 6' 1" (1.854 m)   PainSc: 0-No pain        Physical Exam  Constitutional:       Appearance: He is well-developed.   Cardiovascular:      Comments: Dorsalis pedis and posterior tibial pulses are palpable Skin is atrophic, slightly hyperpigmented, and mildly edematous      Musculoskeletal:         General: No tenderness. Normal range of motion.      Comments: Adequate joint range of motion without pain, limitation, nor crepitation Bilateral feet and ankle joints. Muscle strength is 5/5 in all groups bilaterally.  S/p partial 5th ray amp b/l       Skin:     General: Skin is warm and dry.      Coloration: Skin is ashen. Skin is not jaundiced, mottled or sallow.      Findings: No abscess, ecchymosis, erythema or lesion.      Comments: No open lesions         Neurological:      Mental Status: He is alert and oriented to person, place, and time.      Comments: [place] 5.07 monofilamant testing is diminished Kenji feet. Sharp/dull sensation diminished Bilaterally. Light touch absent Bilaterally.       Psychiatric:         Behavior: Behavior normal. "               Assessment:       Encounter Diagnoses   Name Primary?    Diabetic ulcer of right midfoot associated with type 2 diabetes mellitus, with muscle involvement without evidence of necrosis Yes    Healed ulcer of foot on examination          Plan:       Indio was seen today for wound care and dressing change.    Diagnoses and all orders for this visit:    Diabetic ulcer of right midfoot associated with type 2 diabetes mellitus, with muscle involvement without evidence of necrosis    Healed ulcer of foot on examination      I counseled the patient on his conditions, their implications and medical management.    Independent review of patients previous clinical notes  Reviewed current medication(s), and lab(s) specific to foot ailment(s) with patient in detail. All questions answered     Wound has remained well healed to the left foot.    New shoes and inserts are.  Pending.    Continue foam padding to the area.    Return to clinic for evaluation with Dr. Peterson in 1 week to assure skin tensile strength remains adequate to prevent real ulceration.

## 2023-12-20 NOTE — PROGRESS NOTES
[facility] [address]  [address]  Nurse Visit    Subjective:       Patient seen in clinic today. Patient ambulated to exam room using knee scooter for LLE with defender boot on LLE and Right Foot in tennis shoe with lift. Dressing is intact to LLE, Tubigrip is well place, no strike through drainage. No c/o pain to wound bed at present. Denies fever, chills, nausea, vomiting or diarrhea. Left Plantar Foot has thin epithelization layer covering previous wound bed. Protective wrap continued.  Dressing changed as ordered.      Assessment:          (Retired) Wound 04/08/22 1137 Diabetic Ulcer Left plantar;lateral Foot (Active)   04/08/22 1137    Pre-existing: Yes   Primary Wound Type: Diabetic ulc   Side: Left   Orientation: plantar;lateral   Location: Foot   Wound Number:    Ankle-Brachial Index:    Pulses:    Removal Indication and Assessment:    Wound Outcome:    (Retired) Wound Type:    (Retired) Wound Length (cm):    (Retired) Wound Width (cm):    (Retired) Depth (cm):    Wound Description (Comments):    Removal Indications:    Wound WDL ex 12/20/23 1552   Dressing Appearance Intact;Dry 12/20/23 1552   Drainage Amount None 12/20/23 1552   Appearance Epithelialization;Dry 12/20/23 1552   Tissue loss description Not applicable 12/20/23 1552   Periwound Area Dry;Intact 12/20/23 1552   Wound Edges Callused 12/20/23 1552   Care Cleansed with:;Antimicrobial agent;Sterile normal saline 12/20/23 1552   Dressing Applied;Collagen;Calcium alginate;Absorptive Pad;Foam;Cast padding;Tubular bandage 12/20/23 1552   Periwound Care Moisture barrier applied;Absorptive dressing applied 12/20/23 1552   Compression Tubular elasticized bandage 12/20/23 1552   Off Loading Football dressing;Off loading shoe 12/20/23 1552   Dressing Change Due 12/29/23 12/20/23 1552           Plan:     Wound Dressing Orders     Dressing change frequency once a week   Remove old dressing   Cleanse or irrigate with:  Normal Saline   Protect periwound with: Gentian Violet hugging plantar wound bed then Benzoin to where custom pad will be placed   Primary dressing: WOUND BASE: Cellerate to wound bed then custom felt pad to plantar foot (with opening to allow for drainage) then Kaltostat   Secondary dressing: Mextra, Abram foam long    Off-load/Compression: Open Toe Football dressing (Cast Padding x 2 rolls); Tubigrip single layer, size E. Defender Boot. Knee scooter for LLE to off-load           Follow up in about 9 days (around 12/29/2023) for wound care.

## 2023-12-22 ENCOUNTER — CLINICAL SUPPORT (OUTPATIENT)
Dept: REHABILITATION | Facility: HOSPITAL | Age: 53
End: 2023-12-22
Payer: MEDICARE

## 2023-12-22 DIAGNOSIS — M21.6X2 EQUINUS DEFORMITY OF BOTH FEET: Primary | ICD-10-CM

## 2023-12-22 DIAGNOSIS — Z89.421 H/O AMPUTATION OF LESSER TOE, RIGHT: ICD-10-CM

## 2023-12-22 DIAGNOSIS — M21.6X1 EQUINUS DEFORMITY OF BOTH FEET: Primary | ICD-10-CM

## 2023-12-22 PROCEDURE — 97110 THERAPEUTIC EXERCISES: CPT | Mod: CQ

## 2023-12-22 PROCEDURE — 97112 NEUROMUSCULAR REEDUCATION: CPT | Mod: CQ

## 2023-12-22 NOTE — PROGRESS NOTES
[facility]   Physical Therapy Treatment Note      Name: Indio Ryder Jr.  Clinic Number: [id number]    Therapy Diagnosis:   Encounter Diagnoses   Name Primary?    Equinus deformity of both feet Yes    H/O amputation of lesser toe, right          Physician: Marivel Peterson DPM    Visit Date: 12/22/2023    Physician Orders: PT Eval and Treat   Medical Diagnosis from Referral: Z89.421 (ICD-10-CM) - H/O amputation of lesser toe, right M21.072 (ICD-10-CM) - Eversion deformity of foot, left M21.6X1,M21.6X2 (ICD-10-CM) - Equinus deformity of both feet  Evaluation Date: 11/21/2023  Authorization Period Expiration: 11/16/2024  Plan of Care Expiration: 1/21/2024  Visit # / Visits authorized: 7/20  PTA visit #: 4/5     Foto  Date  Score    #1/3 11/21/2023 44   #2/3  12/22/2023  47   #3/3          Time In: 2:15 pm   Time Out: 3:15 pm  Total Appointment Time (timed & untimed codes): 60 minutes     Precautions: Standard      Subjective     Patient reports: that his right ankle AROM has improved significantly since start of care.  He was compliant with home exercise program.  Response to previous treatment: no adverse effects  Functional change: continues to ambulate with a scooter     Pain: 0/10  Location: left foot      Objective      Objective Measures updated at progress report unless specified.     Treatment     Indio received the treatments listed below:      therapeutic exercises to develop strength, endurance, ROM, flexibility, posture, and core stabilization for 40 minutes:  Patient education on nature of current condition and PHYSICAL THERAPY POC  Written HOME EXERCISE PROGRAM provided, reviewed, patient demo understanding   Arch domes in sitting: 3x10  (Initiate) marble lifts   Right/left towel scrunches: 2x15 - unable to today with wrap in place  +Right SL ankle eversion, against gravity: 3x15 with 5 sec holds  R/L ankle eversion, yellowband: 3x10  Right/left ankle DF, red band:  "3x15  R/L towel swipes, slide board: 3x10   B ankle pumps, 5" hold each way: 3x10   LAQ 10# 3x10  Standing hip abduction 5# 3x10  Standing hip extension 5# 3x10  Nustep level 4.0 x10 minutes      manual therapy techniques: Joint mobilizations, Manual traction, Myofacial release, Soft tissue Mobilization, and Friction Massage for 00 minutes:       neuromuscular re-education activities to improve: Balance, Coordination, Kinesthetic, Sense, Proprioception, and Posture for 20 minutes:  2 point fitter board, fwd/bkw: x45/direction  1 point fitter board, cw/ccw circles: 3x15/direction  static standing with narrow SHER on Airex pad, intermittent upper extremity support, EO: 1x30 sec  static standing with narrow SHER on Airex pad, intermittent upper extremity support, EC: 3x30 sec  static standing in modified tandem stance on Airex pad, intermittent upper extremity support, EO: 2x30 sec/position  static standing in modified tandem stance on Airex pad, intermittent upper extremity support, EC: 2x30 sec/position  (Initiate) BAPS board multidirectional movements      therapeutic activities to improve functional performance for 00 minutes:  (Initiate) Recumbent bike      gait training to improve functional mobility and safety for 00 minutes:       Patient Education and Home Exercises       Education provided:   - Patient was educated on all therapeutic interventions performed during today's treatment visit.     Written Home Exercises Provided: Patient instructed to cont prior HEP. Exercises were reviewed and Indio was able to demonstrate them prior to the end of the session.  Indio demonstrated good  understanding of the education provided. See Electronic Medical Record under Patient Instructions for exercises provided during therapy sessions    Assessment   Patient reported that he is still waiting for his shoe that will prevent inversion during ambulation. Patient continued to demonstrate significant mobility limitations in " left foot, including: toes,mid foot, ankle. Nevertheless, patient continued to endorse a significant increased in right ankle AROM since start of care. Patient presented with wound heavily wrapped as wound on left foot is not healed. No manual care and limited foot intrinsic strengthening performed due to this. Patient has not performed standing dynamic exercises as he has a tendency to roll into left ankle inversion which exacerbates his wound on the lateral left foot. Hence, it is imperative that he obtains an orthotic that will prevent inversion during ambulation. Patient responded well to treatment with no pain reported throughout treatment session. Will continue promote mobility and balance during subsequent therapeutic visits.      Patient prognosis is Good.     Patient will continue to benefit from skilled outpatient physical therapy to address the deficits listed in the problem list box on initial evaluation, provide pt/family education and to maximize pt's level of independence in the home and community environment.     Patient's spiritual, cultural and educational needs considered and pt agreeable to plan of care and goals.     Anticipated barriers to physical therapy: chronicity of current injury and multiple surgical interventions to tx area, multiple tx areas     Goals:   Short Terms Goals: 3 weeks     Goal  Progress  Date    (1)  Patient will be I with HOME EXERCISE PROGRAM  Initial, Not Met 11/21/2023   (2)  Patient will obtain 12 degrees L ankle DF to facilitate improved gait pattern during community mobility tasks   Initial, Not Met   11/21/2023   (3)   Patient will obtain 23 deg L ankle inversion to facilitate improved ability to change direction while moving quickly   Initial, Not Met   11/21/2023      Long Term Goals: 6 weeks     Goal  Progress  Date    (1)  Patient will obtain 12 deg L ankle eversion to facilitate improved ability to complete transfers into/out of vehicle Initial, Not Met   11/21/2023   (2)   Patient will obtain 4/5 L ankle inversion MMT to facilitate improved ability to step over obstacles in pathway   Initial, Not Met  11/21/2023   (3)   Patient will obtain 4/5 ankle eversion MMT to facilitate improved ability to complete community mobility tasks   Initial, Not Met  11/21/2023          Plan     Plan of care Certification: 11/21/2023 to 1/21/2024.     Outpatient Physical Therapy 2 times weekly for 6 weeks o include the following interventions: Cervical/Lumbar Traction, Electrical Stimulation re-eval, dry needling, Gait Training, Iontophoresis (with ), Manual Therapy, Moist Heat/ Ice, Neuromuscular Re-ed, Patient Education, Self Care, Therapeutic Activities, and Therapeutic Exercise.      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      Physical therapist and physical therapy assistant(s) will met face to face to discuss patient's treatment plan and progress towards established goals. Pt will be seen by a physical therapist minimally every 6th visit or every 30 days    Javid Griffiths, PTA     12/22/2023

## 2023-12-29 ENCOUNTER — HOSPITAL ENCOUNTER (OUTPATIENT)
Dept: WOUND CARE | Facility: HOSPITAL | Age: 53
Discharge: HOME | End: 2023-12-29
Attending: PODIATRIST
Payer: MEDICARE

## 2023-12-29 VITALS — SYSTOLIC BLOOD PRESSURE: 145 MMHG | DIASTOLIC BLOOD PRESSURE: 82 MMHG | HEART RATE: 76 BPM | TEMPERATURE: 98 F

## 2023-12-29 DIAGNOSIS — M21.6X2 EQUINUS DEFORMITY OF BOTH FEET: ICD-10-CM

## 2023-12-29 DIAGNOSIS — L84 PRE-ULCERATIVE CALLUSES: ICD-10-CM

## 2023-12-29 DIAGNOSIS — Z89.422 HX OF AMPUTATION OF LESSER TOE, LEFT: ICD-10-CM

## 2023-12-29 DIAGNOSIS — M21.6X1 EQUINUS DEFORMITY OF BOTH FEET: ICD-10-CM

## 2023-12-29 DIAGNOSIS — E11.49 TYPE II DIABETES MELLITUS WITH NEUROLOGICAL MANIFESTATIONS: Primary | ICD-10-CM

## 2023-12-29 DIAGNOSIS — M21.072 EVERSION DEFORMITY OF FOOT, LEFT: ICD-10-CM

## 2023-12-29 DIAGNOSIS — Z89.421 H/O AMPUTATION OF LESSER TOE, RIGHT: ICD-10-CM

## 2023-12-29 PROCEDURE — 99213 OFFICE O/P EST LOW 20 MIN: CPT | Mod: ,,, | Performed by: PODIATRIST

## 2023-12-29 PROCEDURE — 99213 PR OFFICE/OUTPT VISIT, EST, LEVL III, 20-29 MIN: ICD-10-PCS | Mod: ,,, | Performed by: PODIATRIST

## 2023-12-29 PROCEDURE — 11042 DBRDMT SUBQ TIS 1ST 20SQCM/<: CPT | Performed by: PODIATRIST

## 2023-12-29 NOTE — PROGRESS NOTES
[facility]   Physical Therapy Progress Note      Name: Indio Ryder Jr.  Clinic Number: [id number]    Therapy Diagnosis:   Encounter Diagnoses   Name Primary?    Equinus deformity of both feet Yes    H/O amputation of lesser toe, right      Physician: Marivel Peterson DPM    Visit Date: 1/3/2024    Physician Orders: PT Eval and Treat   Medical Diagnosis from Referral: Z89.421 (ICD-10-CM) - H/O amputation of lesser toe, right M21.072 (ICD-10-CM) - Eversion deformity of foot, left M21.6X1,M21.6X2 (ICD-10-CM) - Equinus deformity of both feet  Evaluation Date: 11/21/2023  Authorization Period Expiration: 11/16/2024  Plan of Care Expiration: 1/21/2024  Visit # / Visits authorized: 7/20  PTA visit #: 4/5     Foto  Date  Score    #1/3 11/21/2023 44   #2/3  12/22/2023  47   #3/3          Time In:10:17 am   Time Out: 11:15am  Total Appointment Time: 58 minutes     Precautions: Standard      Subjective     HOME EXERCISE PROGRAM: reviewed, reports compliance   Response to previous treatment: no adverse effects, reports balance challenge with his current boot situation, feels like his ankle/foot is moving laterally better   Function: continues to ambulate with a scooter, states that he is still waiting for a different boot     Pain: 0/10  Location: left foot      Objective    1/3/2024:    Gait/assistive device: Knee scooter, foot wrapped and boot      Range of Motion: AROM (PROM):  Ankle Right  Left    Dorsiflexion 12 4   Plantarflexion 30 29   Inversion 32 16   Eversion 13 8      Strength:  Ankle Right  Left    Dorsiflexion 4/5 4/5   Plantarflexion 4/5 4/5   Inversion 4/5 4-/5   Eversion 3+/5 3+/5      Joint Mobility:   L TCJ, STJ, METs: tba     Palpation/sensation:   LE light touch sensation: tba     Flexibility:   Gastroc: tba  Soleus: tba  Plantar fascia: tba     Function/balance:   Sit - stand: tba X in 30     Edema:   R ankle circumference: 58.0 cm  L ankle circumference: 57.5 cm     R ankle  "joint line: 28.5 cm  L ankle joint line: 31.0 cm     R mets: 22cm  L mets: 22.5 cm     Intake Outcome Measure for FOTO Foot Survey     Therapist reviewed FOTO scores   FOTO documents entered into [facility] - see Media section.     Score: 47          Treatment     CHARGES BASED ON 1-1 TX:  therapeutic exercises to develop strength, endurance, ROM, flexibility, posture, and core stabilization for 48 minutes:  Arch domes in sitting: 3x10  (Initiate) marble lifts   Right/left towel scrunches: 2x15 - unable to today with wrap in place  +Right SL ankle eversion, against gravity: 3x15 with 5 sec holds  R/L ankle eversion, yellowband: 3x10  Right/left ankle DF, red band: 3x15  R/L towel swipes, slide board: 3x10   B ankle pumps, 5" hold each way: 3x10   LAQ 10# 3x10  Standing hip abduction 5# 3x10  Standing hip extension 5# 3x10  Updated PHYSICAL THERAPY POC     manual therapy techniques: Joint mobilizations, Manual traction, Myofacial release, Soft tissue Mobilization, and Friction Massage for 00 minutes:       neuromuscular re-education activities to improve: Balance, Coordination, Kinesthetic, Sense, Proprioception, and Posture for 10 minutes:  2 point fitter board, fwd/bkw: x45/direction  1 point fitter board, cw/ccw circles: 3x15/direction  static standing with narrow SHER on Airex pad, intermittent upper extremity support, EO: 1x30 sec  static standing with narrow SHER on Airex pad, intermittent upper extremity support, EC: 3x30 sec  static standing in modified tandem stance on Airex pad, intermittent upper extremity support, EO: 2x30 sec/position  static standing in modified tandem stance on Airex pad, intermittent upper extremity support, EC: 2x30 sec/position  +R ankle circles, CW/CCW, BAPS board: 3x10     therapeutic activities to improve functional performance for 00 minutes:  (Initiate) Recumbent bike   REINTRODUCE NEXT VISIT: [facility] level 4.0 x10 minutes      gait training to improve functional mobility and safety for " 00 minutes:       Patient Education and Home Exercises       Education provided:   - Patient was educated on all therapeutic interventions performed during today's treatment visit.   Home Exercises Provided: Patient instructed to cont prior HEP. Exercises were reviewed and Indio was able to demonstrate them prior to the end of the session.  Indio demonstrated good  understanding of the education provided. See Electronic Medical Record under Patient Instructions for exercises provided during therapy sessions    Assessment   Patient reported that he is still waiting for his shoe that will prevent inversion during ambulation. Patient continued to demonstrate significant mobility limitations in left foot, including: toes,mid foot, ankle. Nevertheless, patient continued to endorse a significant increased in right ankle AROM since start of care. Patient presented with wound heavily wrapped as wound on left foot is not healed. No manual care and limited foot intrinsic strengthening performed due to this. Patient has not performed standing dynamic exercises as he has a tendency to roll into left ankle inversion which exacerbates his wound on the lateral left foot. Hence, it is imperative that he obtains an orthotic that will prevent inversion during ambulation. Patient responded well to treatment with no pain reported throughout treatment session. Will continue promote mobility and balance during subsequent therapeutic visits. L ankle/foot ROM improving per updated PHYSICAL THERAPY POC.     Patient prognosis is Good.     Patient will continue to benefit from skilled outpatient physical therapy to address the deficits listed in the problem list box on initial evaluation, provide pt/family education and to maximize pt's level of independence in the home and community environment.     Patient's spiritual, cultural and educational needs considered and pt agreeable to plan of care and goals.     Anticipated barriers to  physical therapy: chronicity of current injury and multiple surgical interventions to tx area, multiple tx areas     Goals:   Short Terms Goals: 3 weeks     Goal  Progress  Date    (1)  Patient will be I with HOME EXERCISE PROGRAM  Progressing, not met 1/3/2024   (2)  Patient will obtain 12 degrees L ankle DF to facilitate improved gait pattern during community mobility tasks  Progressing, not met  1/3/2024   (3)   Patient will obtain 23 deg L ankle inversion to facilitate improved ability to change direction while moving quickly  Progressing, not met  1/3/2024      Long Term Goals: 6 weeks     Goal  Progress  Date    (1)  Patient will obtain 12 deg L ankle eversion to facilitate improved ability to complete transfers into/out of vehicle Progressing, Not Met  1/3/2024   (2)   Patient will obtain 4/5 L ankle inversion MMT to facilitate improved ability to step over obstacles in pathway  Progressing, Not Met  1/3/2024   (3)   Patient will obtain 4/5 ankle eversion MMT to facilitate improved ability to complete community mobility tasks   Progressing, Not Met  1/3/2024     Previous Short Term Goals Status: progressing   New Short Term Goals Status: current remain appropriate   Long Term Goal Status:   progressing   Reasons for Recertification of Therapy: update PT POC    Plan     Plan of care Certification: 1/3/2024 to 3/3/2024     Outpatient Physical Therapy 2 times weekly for 3 weeks o include the following interventions: Cervical/Lumbar Traction, Electrical Stimulation re-eval, dry needling, Gait Training, Iontophoresis (with ), Manual Therapy, Moist Heat/ Ice, Neuromuscular Re-ed, Patient Education, Self Care, Therapeutic Activities, and Therapeutic Exercise.      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      Physical therapist and physical therapy assistant(s) will met face to face to discuss patient's treatment plan and progress towards established goals. Pt will be seen by a physical therapist minimally every 6th visit or every 30 days    Geetha Khan, PT     01/02/2024    I CERTIFY THE NEED FOR THESE SERVICES FURNISHED UNDER THIS PLAN OF TREATMENT AND WHILE UNDER MY CARE     Physician's comments:           Physician's Signature: ___________________________________________________

## 2023-12-29 NOTE — PROGRESS NOTES
[facility]                Progress Note    Subjective:       Patient ID: Indio Ryder Jr. is a 53 y.o. male.    Chief Complaint: Wound Check, Dressing Change, and Diabetic Foot Ulcer    Follow up wound care visit. Patient ambulated to exam room using knee scooter for LLE with defender boot on LLE and Right Foot in tennis shoe with lift. Dressing is intact to LLE, Tubigrip is well place, no strike through drainage. No c/o pain to wound bed at present. Denies fever, chills, nausea, vomiting or diarrhea.     Wound Check  There is no pain present.     Review of Systems   Constitutional:  Negative for appetite change, chills, fatigue and fever.   HENT:  Negative for hearing loss.    Eyes:  Negative for photophobia and visual disturbance.   Respiratory:  Negative for cough, chest tightness, shortness of breath and wheezing.    Cardiovascular:  Positive for leg swelling. Negative for chest pain and palpitations.   Gastrointestinal:  Negative for constipation, diarrhea, nausea and vomiting.   Endocrine: Negative for cold intolerance and heat intolerance.   Genitourinary:  Negative for flank pain.   Musculoskeletal:  Positive for gait problem and myalgias. Negative for neck pain and neck stiffness.   Skin:  Positive for wound. Negative for color change.   Neurological:  Positive for numbness. Negative for weakness, light-headedness and headaches.        + paresthesia    Psychiatric/Behavioral:  Negative for sleep disturbance.          Objective:        Physical Exam  Constitutional:       Appearance: He is well-developed.   Cardiovascular:      Comments: Dorsalis pedis and posterior tibial pulses are palpable Skin is atrophic, slightly hyperpigmented, and mildly edematous      Musculoskeletal:         General: No tenderness. Normal range of motion.      Comments: Muscle strength is 5/5 in all groups bilaterally.    S/p partial 5th ray amp b/l    S/p hallux amp right    Fat  pad atrophy to heels and met heads bilateral       Skin:     General: Skin is warm and dry.      Coloration: Skin is not jaundiced, mottled or sallow.      Findings: Wound (see below) present. No abscess or ecchymosis.      Comments:        Neurological:      Mental Status: He is alert and oriented to person, place, and time.      Comments: Talpa-Nenita 5.07 monofilamant testing is diminished Kenji feet. Sharp/dull sensation diminished Bilaterally. Light touch absent Bilaterally.       Psychiatric:         Behavior: Behavior normal.         Vitals:    12/29/23 1036   BP: (!) 145/82   Pulse: 76   Temp: 98 °F (36.7 °C)       Assessment:           ICD-10-CM ICD-9-CM   1. Type II diabetes mellitus with neurological manifestations  E11.49 250.60   2. Pre-ulcerative calluses  L84 700   3. Hx of amputation of lesser toe, left  Z89.422 V49.72   4. H/O amputation of lesser toe, right  Z89.421 V49.72   5. Eversion deformity of foot, left  M21.072 736.79   6. Equinus deformity of both feet  M21.6X1 736.79    M21.6X2             (Retired) Wound 04/08/22 1137 Diabetic Ulcer Left plantar;lateral Foot (Active)   04/08/22 1137    Pre-existing: Yes   Primary Wound Type: Diabetic ulc   Side: Left   Orientation: plantar;lateral   Location: Foot   Wound Number:    Ankle-Brachial Index:    Pulses:    Removal Indication and Assessment:    Wound Outcome:    (Retired) Wound Type:    (Retired) Wound Length (cm):    (Retired) Wound Width (cm):    (Retired) Depth (cm):    Wound Description (Comments):    Removal Indications:    Wound Image   12/29/23 1502   Wound WDL ex 12/29/23 1502   Dressing Appearance Intact;Dry 12/29/23 1502   Drainage Amount None 12/29/23 1502   Appearance Epithelialization;Red 12/29/23 1502   Tissue loss description Partial thickness 12/29/23 1502   Black (%), Wound Tissue Color 0 % 12/29/23 1502   Red (%), Wound Tissue Color 100 % 12/29/23 1502   Yellow (%), Wound Tissue Color 0 % 12/29/23 1502   Periwound Area Dry  12/29/23 1502   Wound Edges Callused;Defined 12/29/23 1502   Wound Length (cm) 0.1 cm 12/29/23 1502   Wound Width (cm) 0.1 cm 12/29/23 1502   Wound Depth (cm) 0 cm 12/29/23 1502   Wound Volume (cm^3) 0 cm^3 12/29/23 1502   Wound Surface Area (cm^2) 0.01 cm^2 12/29/23 1502   Tunneling (depth (cm)/location) 0 12/29/23 1502   Undermining (depth (cm)/location) 0 12/29/23 1502   Care Cleansed with:;Antimicrobial agent;Sterile normal saline 12/29/23 1502   Dressing Applied;Collagen;Calcium alginate;Absorptive Pad;Foam;Cast padding;Tubular bandage 12/29/23 1502   Periwound Care Skin barrier film applied;Absorptive dressing applied 12/29/23 1502   Compression Tubular elasticized bandage 12/29/23 1502   Off Loading Football dressing;Off loading shoe 12/29/23 1502   Dressing Change Due 01/05/24 12/29/23 1502             Plan:              Education about the prevention of limb loss.    Discussed wound healing cycle, skin integrity, ways to care for skin.Counseled patient on the effects of biomechanical pressure,  PVD, and blood glucose on healing. He verbalizes understanding that it can increase the chances of delayed healing and this prolonged exposure leads to infection or progression of infection which subsequently can result in loss of limb.    The site is cleansed of foreign material as much as possible and has callus around previous wound bed opening with thin epithelization over wound bed.    Currently in PT    Long-term goals include keeping the wound healed by good offloading and medical management under the direction of internist.      Procedures    Orders Placed This Encounter   Procedures    Change dressing     Wound Dressing Orders    Dressing change frequency once a week  Remove old dressing  Cleanse or irrigate with: Normal Saline  Protect periwound with: Gentian Violet hugging plantar wound bed then Benzoin to where custom pad will be placed  Primary dressing: WOUND BASE: Cellerate to wound bed then custom  felt pad to plantar foot (with opening to allow for drainage) then Kaltostat  Secondary dressing: Mextra, Abram foam long    Off-load/Compression: Open Toe Football dressing (Cast Padding x 2 rolls); Tubigrip single layer, size E. Defender Boot. Knee scooter for LLE to off-load    Return to clinic in 1 week. PRN nurse visit if needed.          Follow up in about 1 week (around 1/5/2024) for wound care.

## 2024-01-03 ENCOUNTER — CLINICAL SUPPORT (OUTPATIENT)
Dept: REHABILITATION | Facility: HOSPITAL | Age: 54
End: 2024-01-03
Payer: MEDICARE

## 2024-01-03 DIAGNOSIS — M21.6X1 EQUINUS DEFORMITY OF BOTH FEET: Primary | ICD-10-CM

## 2024-01-03 DIAGNOSIS — M21.6X2 EQUINUS DEFORMITY OF BOTH FEET: Primary | ICD-10-CM

## 2024-01-03 DIAGNOSIS — Z89.421 H/O AMPUTATION OF LESSER TOE, RIGHT: ICD-10-CM

## 2024-01-03 PROCEDURE — 97110 THERAPEUTIC EXERCISES: CPT

## 2024-01-04 NOTE — PROGRESS NOTES
[facility]                Progress Note    Subjective:       Patient ID: Indio Ryder Jr. is a 53 y.o. male.    Chief Complaint: Wound Check and Dressing Change    Follow up wound care visit. Patient ambulated to exam room using knee scooter for LLE with defender boot on LLE and Right Foot in tennis shoe with lift. Denies fever, chills, nausea, vomiting or diarrhea. Dressing is intact to LLE, Tubigrip is well place, no strike through drainage. No c/o pain to wound bed at present. Patient reports stopping Augmentin therapy.    Wound Check  There is no pain present.     Review of Systems   Constitutional:  Negative for appetite change, chills, fatigue and fever.   HENT:  Negative for hearing loss.    Eyes:  Negative for photophobia and visual disturbance.   Respiratory:  Negative for cough, chest tightness, shortness of breath and wheezing.    Cardiovascular:  Positive for leg swelling. Negative for chest pain and palpitations.   Gastrointestinal:  Negative for constipation, diarrhea, nausea and vomiting.   Endocrine: Negative for cold intolerance and heat intolerance.   Genitourinary:  Negative for flank pain.   Musculoskeletal:  Positive for gait problem and myalgias. Negative for neck pain and neck stiffness.   Skin:  Positive for wound. Negative for color change.   Neurological:  Positive for numbness. Negative for weakness, light-headedness and headaches.        + paresthesia    Psychiatric/Behavioral:  Negative for sleep disturbance.          Objective:        Physical Exam  Constitutional:       Appearance: He is well-developed.   Cardiovascular:      Comments: Dorsalis pedis and posterior tibial pulses are palpable Skin is atrophic, slightly hyperpigmented, and mildly edematous      Musculoskeletal:         General: No tenderness. Normal range of motion.      Comments: Muscle strength is 5/5 in all groups bilaterally.    S/p partial 5th ray amp b/l    S/p  hallux amp right    Fat pad atrophy to heels and met heads bilateral       Skin:     General: Skin is warm and dry.      Coloration: Skin is not jaundiced, mottled or sallow.      Findings: Wound (see below) present. No abscess or ecchymosis.      Comments:        Neurological:      Mental Status: He is alert and oriented to person, place, and time.      Comments: [place]-Nenita 5.07 monofilamant testing is diminished Kenji feet. Sharp/dull sensation diminished Bilaterally. Light touch absent Bilaterally.       Psychiatric:         Behavior: Behavior normal.         Vitals:    01/05/24 1016   BP: (!) 141/92   Temp: 98.5 °F (36.9 °C)       Assessment:           ICD-10-CM ICD-9-CM   1. Type II diabetes mellitus with neurological manifestations  E11.49 250.60   2. Pre-ulcerative calluses  L84 700   3. Hx of amputation of lesser toe, left  Z89.422 V49.72            (Retired) Wound 04/08/22 1137 Diabetic Ulcer Left plantar;lateral Foot (Active)   04/08/22 1137    Pre-existing: Yes   Primary Wound Type: Diabetic ulc   Side: Left   Orientation: plantar;lateral   Location: Foot   Wound Number:    Ankle-Brachial Index:    Pulses:    Removal Indication and Assessment:    Wound Outcome:    (Retired) Wound Type:    (Retired) Wound Length (cm):    (Retired) Wound Width (cm):    (Retired) Depth (cm):    Wound Description (Comments):    Removal Indications:    Wound Image    01/05/24 1533   Wound WDL WDL 01/05/24 1533   Dressing Appearance Intact;Dry 01/05/24 1533   Drainage Amount None 01/05/24 1533   Appearance Closed/resurfaced 01/05/24 1533   Tissue loss description Not applicable 01/05/24 1533   Periwound Area Dry;Intact 01/05/24 1533   Wound Edges Callused 01/05/24 1533   Wound Length (cm) 0 cm 01/05/24 1533   Wound Width (cm) 0 cm 01/05/24 1533   Wound Depth (cm) 0 cm 01/05/24 1533   Wound Volume (cm^3) 0 cm^3 01/05/24 1533   Wound Surface Area (cm^2) 0 cm^2 01/05/24 1533   Tunneling (depth (cm)/location) 0 01/05/24 1533    Undermining (depth (cm)/location) 0 01/05/24 1533   Care Cleansed with:;Antimicrobial agent;Sterile normal saline;Debrided 01/05/24 1533   Dressing Applied;Collagen;Foam;Cast padding;Tubular bandage 01/05/24 1533   Periwound Care Dry periwound area maintained 01/05/24 1533   Compression Tubular elasticized bandage 01/05/24 1533   Off Loading Football dressing;Off loading shoe 01/05/24 1533   Dressing Change Due 01/12/24 01/05/24 1533             Plan:              Education about the prevention of limb loss.    Discussed wound healing cycle, skin integrity, ways to care for skin.Counseled patient on the effects of biomechanical pressure,  PVD, and blood glucose on healing. He verbalizes understanding that it can increase the chances of delayed healing and this prolonged exposure leads to infection or progression of infection which subsequently can result in loss of limb.    The site is cleansed of foreign material as much as possible, Left Lateral/Plantar Foot wound has callus around previous wound bed but wound appears closed will continue with protective football that patient will remove next Thursday to try on new diabetic shoe with inserts and brace. He will paint previous wound bed with a thin layer of betadine, allow to dry before reapply sock.     Currently in PT    Long-term goals include keeping the wound healed by good offloading and medical management under the direction of internist.      Procedures    Orders Placed This Encounter   Procedures    Change dressing     Wound Dressing Orders    Dressing change frequency once a week  Remove old dressing  Cleanse or irrigate with: Normal Saline  Protect periwound with: Iodine  Primary dressing: WOUND BASE: Cellerate to wound bed   Secondary dressing: Abram foam long then short to offload pressure  Off-load/Compression: protective Football dressing (Cast Padding x 2 rolls); Tubigrip single layer, size E. Defender Boot. Knee scooter for LLE to  off-load    Return to clinic in 1 week. PRN nurse visit if needed.          Follow up in about 1 week (around 1/12/2024) for wound care.

## 2024-01-04 NOTE — PLAN OF CARE
[facility]   Physical Therapy Progress Note      Name: Indio Ryder Jr.  Clinic Number: [id number]    Therapy Diagnosis:   Encounter Diagnoses   Name Primary?    Equinus deformity of both feet Yes    H/O amputation of lesser toe, right      Physician: Marivel Peterson DPM    Visit Date: 1/3/2024    Physician Orders: PT Eval and Treat   Medical Diagnosis from Referral: Z89.421 (ICD-10-CM) - H/O amputation of lesser toe, right M21.072 (ICD-10-CM) - Eversion deformity of foot, left M21.6X1,M21.6X2 (ICD-10-CM) - Equinus deformity of both feet  Evaluation Date: 11/21/2023  Authorization Period Expiration: 11/16/2024  Plan of Care Expiration: 1/21/2024  Visit # / Visits authorized: 7/20  PTA visit #: 4/5     Foto  Date  Score    #1/3 11/21/2023 44   #2/3  12/22/2023  47   #3/3          Time In:10:17 am   Time Out: 11:15am  Total Appointment Time: 58 minutes     Precautions: Standard      Subjective     HOME EXERCISE PROGRAM: reviewed, reports compliance   Response to previous treatment: no adverse effects, reports balance challenge with his current boot situation, feels like his ankle/foot is moving laterally better   Function: continues to ambulate with a scooter, states that he is still waiting for a different boot     Pain: 0/10  Location: left foot      Objective    1/3/2024:    Gait/assistive device: Knee scooter, foot wrapped and boot      Range of Motion: AROM (PROM):  Ankle Right  Left    Dorsiflexion 12 4   Plantarflexion 30 29   Inversion 32 16   Eversion 13 8      Strength:  Ankle Right  Left    Dorsiflexion 4/5 4/5   Plantarflexion 4/5 4/5   Inversion 4/5 4-/5   Eversion 3+/5 3+/5      Joint Mobility:   L TCJ, STJ, METs: tba     Palpation/sensation:   LE light touch sensation: tba     Flexibility:   Gastroc: tba  Soleus: tba  Plantar fascia: tba     Function/balance:   Sit - stand: tba X in 30     Edema:   R ankle circumference: 58.0 cm  L ankle circumference: 57.5 cm     R ankle  "joint line: 28.5 cm  L ankle joint line: 31.0 cm     R mets: 22cm  L mets: 22.5 cm     Intake Outcome Measure for FOTO Foot Survey     Therapist reviewed FOTO scores   FOTO documents entered into [facility] - see Media section.     Score: 47          Treatment     CHARGES BASED ON 1-1 TX:  therapeutic exercises to develop strength, endurance, ROM, flexibility, posture, and core stabilization for 48 minutes:  Arch domes in sitting: 3x10  (Initiate) marble lifts   Right/left towel scrunches: 2x15 - unable to today with wrap in place  +Right SL ankle eversion, against gravity: 3x15 with 5 sec holds  R/L ankle eversion, yellowband: 3x10  Right/left ankle DF, red band: 3x15  R/L towel swipes, slide board: 3x10   B ankle pumps, 5" hold each way: 3x10   LAQ 10# 3x10  Standing hip abduction 5# 3x10  Standing hip extension 5# 3x10  Updated PHYSICAL THERAPY POC     manual therapy techniques: Joint mobilizations, Manual traction, Myofacial release, Soft tissue Mobilization, and Friction Massage for 00 minutes:       neuromuscular re-education activities to improve: Balance, Coordination, Kinesthetic, Sense, Proprioception, and Posture for 10 minutes:  2 point fitter board, fwd/bkw: x45/direction  1 point fitter board, cw/ccw circles: 3x15/direction  static standing with narrow SHER on Airex pad, intermittent upper extremity support, EO: 1x30 sec  static standing with narrow SHER on Airex pad, intermittent upper extremity support, EC: 3x30 sec  static standing in modified tandem stance on Airex pad, intermittent upper extremity support, EO: 2x30 sec/position  static standing in modified tandem stance on Airex pad, intermittent upper extremity support, EC: 2x30 sec/position  +R ankle circles, CW/CCW, BAPS board: 3x10     therapeutic activities to improve functional performance for 00 minutes:  (Initiate) Recumbent bike   REINTRODUCE NEXT VISIT: [facility] [[email or web address]] level 4.0 x10 minutes      gait training to improve functional mobility and safety for " 00 minutes:       Patient Education and Home Exercises       Education provided:   - Patient was educated on all therapeutic interventions performed during today's treatment visit.   Home Exercises Provided: Patient instructed to cont prior HEP. Exercises were reviewed and Indio was able to demonstrate them prior to the end of the session.  Indio demonstrated good  understanding of the education provided. See Electronic Medical Record under Patient Instructions for exercises provided during therapy sessions    Assessment   Patient reported that he is still waiting for his shoe that will prevent inversion during ambulation. Patient continued to demonstrate significant mobility limitations in left foot, including: toes,mid foot, ankle. Nevertheless, patient continued to endorse a significant increased in right ankle AROM since start of care. Patient presented with wound heavily wrapped as wound on left foot is not healed. No manual care and limited foot intrinsic strengthening performed due to this. Patient has not performed standing dynamic exercises as he has a tendency to roll into left ankle inversion which exacerbates his wound on the lateral left foot. Hence, it is imperative that he obtains an orthotic that will prevent inversion during ambulation. Patient responded well to treatment with no pain reported throughout treatment session. Will continue promote mobility and balance during subsequent therapeutic visits. L ankle/foot ROM improving per updated PHYSICAL THERAPY POC.     Patient prognosis is Good.     Patient will continue to benefit from skilled outpatient physical therapy to address the deficits listed in the problem list box on initial evaluation, provide pt/family education and to maximize pt's level of independence in the home and community environment.     Patient's spiritual, cultural and educational needs considered and pt agreeable to plan of care and goals.     Anticipated barriers to  physical therapy: chronicity of current injury and multiple surgical interventions to tx area, multiple tx areas     Goals:   Short Terms Goals: 3 weeks     Goal  Progress  Date    (1)  Patient will be I with HOME EXERCISE PROGRAM  Progressing, not met 1/3/2024   (2)  Patient will obtain 12 degrees L ankle DF to facilitate improved gait pattern during community mobility tasks  Progressing, not met  1/3/2024   (3)   Patient will obtain 23 deg L ankle inversion to facilitate improved ability to change direction while moving quickly  Progressing, not met  1/3/2024      Long Term Goals: 6 weeks     Goal  Progress  Date    (1)  Patient will obtain 12 deg L ankle eversion to facilitate improved ability to complete transfers into/out of vehicle Progressing, Not Met  1/3/2024   (2)   Patient will obtain 4/5 L ankle inversion MMT to facilitate improved ability to step over obstacles in pathway  Progressing, Not Met  1/3/2024   (3)   Patient will obtain 4/5 ankle eversion MMT to facilitate improved ability to complete community mobility tasks   Progressing, Not Met  1/3/2024     Previous Short Term Goals Status: progressing   New Short Term Goals Status: current remain appropriate   Long Term Goal Status:   progressing   Reasons for Recertification of Therapy: update PT POC    Plan     Plan of care Certification: 1/3/2024 to 3/3/2024     Outpatient Physical Therapy 2 times weekly for 3 weeks o include the following interventions: Cervical/Lumbar Traction, Electrical Stimulation re-eval, dry needling, Gait Training, Iontophoresis (with ), Manual Therapy, Moist Heat/ Ice, Neuromuscular Re-ed, Patient Education, Self Care, Therapeutic Activities, and Therapeutic Exercise.      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      Physical therapist and physical therapy assistant(s) will met face to face to discuss patient's treatment plan and progress towards established goals. Pt will be seen by a physical therapist minimally every 6th visit or every 30 days    Geetha Khan, PT     01/02/2024    I CERTIFY THE NEED FOR THESE SERVICES FURNISHED UNDER THIS PLAN OF TREATMENT AND WHILE UNDER MY CARE     Physician's comments:           Physician's Signature: ___________________________________________________

## 2024-01-05 ENCOUNTER — HOSPITAL ENCOUNTER (OUTPATIENT)
Dept: WOUND CARE | Facility: HOSPITAL | Age: 54
Discharge: HOME | End: 2024-01-05
Attending: PODIATRIST
Payer: MEDICARE

## 2024-01-05 VITALS
DIASTOLIC BLOOD PRESSURE: 92 MMHG | SYSTOLIC BLOOD PRESSURE: 141 MMHG | BODY MASS INDEX: 29.16 KG/M2 | WEIGHT: 220 LBS | HEIGHT: 73 IN | TEMPERATURE: 99 F

## 2024-01-05 DIAGNOSIS — E11.49 TYPE II DIABETES MELLITUS WITH NEUROLOGICAL MANIFESTATIONS: Primary | ICD-10-CM

## 2024-01-05 DIAGNOSIS — Z89.422 HX OF AMPUTATION OF LESSER TOE, LEFT: ICD-10-CM

## 2024-01-05 DIAGNOSIS — L84 PRE-ULCERATIVE CALLUSES: ICD-10-CM

## 2024-01-05 PROCEDURE — 11042 DBRDMT SUBQ TIS 1ST 20SQCM/<: CPT | Performed by: PODIATRIST

## 2024-01-05 PROCEDURE — 99213 OFFICE O/P EST LOW 20 MIN: CPT | Mod: ,,, | Performed by: PODIATRIST

## 2024-01-06 ENCOUNTER — DOCUMENTATION ONLY (OUTPATIENT)
Dept: REHABILITATION | Facility: HOSPITAL | Age: 54
End: 2024-01-06
Payer: MEDICARE

## 2024-01-06 DIAGNOSIS — Z89.421 H/O AMPUTATION OF LESSER TOE, RIGHT: ICD-10-CM

## 2024-01-06 DIAGNOSIS — M21.6X1 EQUINUS DEFORMITY OF BOTH FEET: Primary | ICD-10-CM

## 2024-01-06 DIAGNOSIS — M21.6X2 EQUINUS DEFORMITY OF BOTH FEET: Primary | ICD-10-CM

## 2024-01-06 NOTE — PROGRESS NOTES
Outpatient Therapy Compliance Update     Name: Indio Ryder Jr.  Clinic Number: [id number]    Therapy Diagnosis:   Encounter Diagnoses   Name Primary?    Equinus deformity of both feet Yes    H/O amputation of lesser toe, right      Physician: SILVA BANSAL     Care Update      Today, 1/5/2024,  Indio Ryder Jr. no-showed to his Physical Therapy appointment.         Geetha Khan, PT

## 2024-01-12 ENCOUNTER — HOSPITAL ENCOUNTER (OUTPATIENT)
Dept: WOUND CARE | Facility: HOSPITAL | Age: 54
Discharge: HOME | End: 2024-01-12
Attending: PODIATRIST
Payer: MEDICARE

## 2024-01-12 VITALS
BODY MASS INDEX: 29.16 KG/M2 | DIASTOLIC BLOOD PRESSURE: 85 MMHG | WEIGHT: 220 LBS | HEART RATE: 81 BPM | HEIGHT: 73 IN | SYSTOLIC BLOOD PRESSURE: 176 MMHG

## 2024-01-12 DIAGNOSIS — E11.49 TYPE II DIABETES MELLITUS WITH NEUROLOGICAL MANIFESTATIONS: Primary | ICD-10-CM

## 2024-01-12 DIAGNOSIS — Z89.422 HX OF AMPUTATION OF LESSER TOE, LEFT: ICD-10-CM

## 2024-01-12 DIAGNOSIS — L84 PRE-ULCERATIVE CALLUSES: ICD-10-CM

## 2024-01-12 PROCEDURE — 11042 DBRDMT SUBQ TIS 1ST 20SQCM/<: CPT | Performed by: PODIATRIST

## 2024-01-12 PROCEDURE — 99213 OFFICE O/P EST LOW 20 MIN: CPT | Mod: ,,, | Performed by: PODIATRIST

## 2024-01-12 NOTE — PROGRESS NOTES
[facility]                Progress Note    Subjective:       Patient ID: Indio Ryder Jr. is a 53 y.o. male.    Chief Complaint: Wound Check, Dressing Change, and Diabetic Foot Ulcer    Follow up wound care visit. Patient ambulated to exam room using knee scooter for LLE with defender boot on LLE and Right Foot in tennis shoe with lift. Denies fever, chills, nausea, vomiting or diarrhea. Dressing is intact to LLE, Tubigrip is well place, no strike through drainage. No c/o pain to wound bed at present. Patient reports he was not available to  his new diabetic shoes with inserts and brace d/t fitter being out, patient will try again next week.    Wound Check  There is no pain present.     Review of Systems   Constitutional:  Negative for appetite change, chills, fatigue and fever.   HENT:  Negative for hearing loss.    Eyes:  Negative for photophobia and visual disturbance.   Respiratory:  Negative for cough, chest tightness, shortness of breath and wheezing.    Cardiovascular:  Positive for leg swelling. Negative for chest pain and palpitations.   Gastrointestinal:  Negative for constipation, diarrhea, nausea and vomiting.   Endocrine: Negative for cold intolerance and heat intolerance.   Genitourinary:  Negative for flank pain.   Musculoskeletal:  Positive for gait problem and myalgias. Negative for neck pain and neck stiffness.   Skin:  Positive for wound. Negative for color change.   Neurological:  Positive for numbness. Negative for weakness, light-headedness and headaches.        + paresthesia    Psychiatric/Behavioral:  Negative for sleep disturbance.          Objective:        Physical Exam  Constitutional:       Appearance: He is well-developed.   Cardiovascular:      Comments: Dorsalis pedis and posterior tibial pulses are palpable Skin is atrophic, slightly hyperpigmented, and mildly edematous      Musculoskeletal:         General: No tenderness.  Normal range of motion.      Comments: Muscle strength is 5/5 in all groups bilaterally.    S/p partial 5th ray amp b/l    S/p hallux amp right    Fat pad atrophy to heels and met heads bilateral       Skin:     General: Skin is warm and dry.      Coloration: Skin is not jaundiced, mottled or sallow.      Findings: Wound (see below) present. No abscess or ecchymosis.      Comments:        Neurological:      Mental Status: He is alert and oriented to person, place, and time.      Comments: [place]-Nenita 5.07 monofilamant testing is diminished Kenji feet. Sharp/dull sensation diminished Bilaterally. Light touch absent Bilaterally.       Psychiatric:         Behavior: Behavior normal.         Vitals:    01/12/24 1023   BP: (!) 176/85   Pulse: 81       Assessment:           ICD-10-CM ICD-9-CM   1. Type II diabetes mellitus with neurological manifestations  E11.49 250.60   2. Pre-ulcerative calluses  L84 700   3. Hx of amputation of lesser toe, left  Z89.422 V49.72            (Retired) Wound 04/08/22 1137 Diabetic Ulcer Left plantar;lateral Foot (Active)   04/08/22 1137    Pre-existing: Yes   Primary Wound Type: Diabetic ulc   Side: Left   Orientation: plantar;lateral   Location: Foot   Wound Number:    Ankle-Brachial Index:    Pulses:    Removal Indication and Assessment:    Wound Outcome:    (Retired) Wound Type:    (Retired) Wound Length (cm):    (Retired) Wound Width (cm):    (Retired) Depth (cm):    Wound Description (Comments):    Removal Indications:    Wound Image   01/12/24 1021   Wound WDL ex 01/12/24 1021   Dressing Appearance Intact;Dry 01/12/24 1021   Drainage Amount None 01/12/24 1021   Appearance Red 01/12/24 1021   Tissue loss description Partial thickness 01/12/24 1021   Black (%), Wound Tissue Color 0 % 01/12/24 1021   Red (%), Wound Tissue Color 100 % 01/12/24 1021   Yellow (%), Wound Tissue Color 0 % 01/12/24 1021   Periwound Area Dry;Intact 01/12/24 1021   Wound Edges Callused;Defined 01/12/24 1021    Wound Length (cm) 0.1 cm 01/12/24 1021   Wound Width (cm) 0.1 cm 01/12/24 1021   Wound Depth (cm) 0 cm 01/12/24 1021   Wound Volume (cm^3) 0 cm^3 01/12/24 1021   Wound Surface Area (cm^2) 0.01 cm^2 01/12/24 1021   Tunneling (depth (cm)/location) 0 01/12/24 1021   Undermining (depth (cm)/location) 0 01/12/24 1021   Care Cleansed with:;Antimicrobial agent;Sterile normal saline 01/12/24 1021   Dressing Removed;Applied;Collagen;Foam;Cast padding;Tubular bandage 01/12/24 1021   Periwound Care Dry periwound area maintained;Absorptive dressing applied 01/12/24 1021   Compression Tubular elasticized bandage 01/12/24 1021   Off Loading Football dressing;Off loading shoe 01/12/24 1021   Dressing Change Due 01/19/24 01/12/24 1021             Plan:              Education about the prevention of limb loss.    Discussed wound healing cycle, skin integrity, ways to care for skin.Counseled patient on the effects of biomechanical pressure,  PVD, and blood glucose on healing. He verbalizes understanding that it can increase the chances of delayed healing and this prolonged exposure leads to infection or progression of infection which subsequently can result in loss of limb.    The site is cleansed of foreign material as much as possible, Left Lateral/Plantar Foot wound has callus around previous wound bed but wound appears closed will continue with protective football     Currently in PT    Long-term goals include keeping the wound healed by good offloading and medical management under the direction of internist.      Procedures    Orders Placed This Encounter   Procedures    Change dressing     Wound Dressing Orders     Dressing change frequency once a week   Remove old dressing   Cleanse or irrigate with: Normal Saline   Protect periwound with: Iodine   Primary dressing: WOUND BASE: Cellerate to wound bed   Secondary dressing: Abram foam long then short to offload pressure   Off-load/Compression: protective Football dressing  (Cast Padding x 2 rolls); Tubigrip single layer, size E. Defender Boot. Knee scooter for LLE to off-load     Return to clinic in 1 week. PRN nurse visit if needed.          Follow up in about 1 week (around 1/19/2024) for wound care.

## 2024-01-19 ENCOUNTER — HOSPITAL ENCOUNTER (OUTPATIENT)
Dept: WOUND CARE | Facility: HOSPITAL | Age: 54
Discharge: HOME | End: 2024-01-19
Attending: PODIATRIST
Payer: MEDICARE

## 2024-01-19 VITALS — HEART RATE: 97 BPM | DIASTOLIC BLOOD PRESSURE: 72 MMHG | TEMPERATURE: 98 F | SYSTOLIC BLOOD PRESSURE: 147 MMHG

## 2024-01-19 DIAGNOSIS — E11.49 TYPE II DIABETES MELLITUS WITH NEUROLOGICAL MANIFESTATIONS: Primary | ICD-10-CM

## 2024-01-19 DIAGNOSIS — L84 PRE-ULCERATIVE CALLUSES: ICD-10-CM

## 2024-01-19 PROCEDURE — 11042 DBRDMT SUBQ TIS 1ST 20SQCM/<: CPT | Performed by: PODIATRIST

## 2024-01-19 PROCEDURE — 99213 OFFICE O/P EST LOW 20 MIN: CPT | Mod: ,,, | Performed by: PODIATRIST

## 2024-01-19 NOTE — PROGRESS NOTES
[facility]                Progress Note    Subjective:       Patient ID: Indio Ryder Jr. is a 53 y.o. male.    Chief Complaint: Wound Care    Follow up wound care visit. Patient ambulated to exam room unaided with diabetic shoes on feet and Rx brace on Left Foot (inside shoe). Denies fever, chills, nausea, vomiting or diarrhea. Dressing is intact to LLE, Tubigrip is well place, no strike through drainage. No c/o pain to wound bed at present.     Wound Check  There is no pain present.     Review of Systems   Constitutional:  Negative for appetite change, chills, fatigue and fever.   HENT:  Negative for hearing loss.    Eyes:  Negative for photophobia and visual disturbance.   Respiratory:  Negative for cough, chest tightness, shortness of breath and wheezing.    Cardiovascular:  Positive for leg swelling. Negative for chest pain and palpitations.   Gastrointestinal:  Negative for constipation, diarrhea, nausea and vomiting.   Endocrine: Negative for cold intolerance and heat intolerance.   Genitourinary:  Negative for flank pain.   Musculoskeletal:  Positive for gait problem and myalgias. Negative for neck pain and neck stiffness.   Skin:  Positive for wound. Negative for color change.   Neurological:  Positive for numbness. Negative for weakness, light-headedness and headaches.        + paresthesia    Psychiatric/Behavioral:  Negative for sleep disturbance.          Objective:        Physical Exam  Constitutional:       Appearance: He is well-developed.   Cardiovascular:      Comments: Dorsalis pedis and posterior tibial pulses are palpable Skin is atrophic, slightly hyperpigmented, and mildly edematous      Musculoskeletal:         General: No tenderness. Normal range of motion.      Comments: Muscle strength is 5/5 in all groups bilaterally.    S/p partial 5th ray amp b/l    S/p hallux amp right    Fat pad atrophy to heels and met heads bilateral       Skin:      General: Skin is warm and dry.      Coloration: Skin is not jaundiced, mottled or sallow.      Findings: Wound (see below) present. No abscess or ecchymosis.      Comments:        Neurological:      Mental Status: He is alert and oriented to person, place, and time.      Comments: [place]enita 5.07 monofilamant testing is diminished Kenji feet. Sharp/dull sensation diminished Bilaterally. Light touch absent Bilaterally.       Psychiatric:         Behavior: Behavior normal.         Vitals:    01/19/24 1046   BP: (!) 147/72   Pulse: 97   Temp: 97.7 °F (36.5 °C)       Assessment:           ICD-10-CM ICD-9-CM   1. Type II diabetes mellitus with neurological manifestations  E11.49 250.60   2. Pre-ulcerative calluses  L84 700            (Retired) Wound 04/08/22 1137 Diabetic Ulcer Left plantar;lateral Foot (Active)   04/08/22 1137    Pre-existing: Yes   Primary Wound Type: Diabetic ulc   Side: Left   Orientation: plantar;lateral   Location: Foot   Wound Number:    Ankle-Brachial Index:    Pulses:    Removal Indication and Assessment:    Wound Outcome:    (Retired) Wound Type:    (Retired) Wound Length (cm):    (Retired) Wound Width (cm):    (Retired) Depth (cm):    Wound Description (Comments):    Removal Indications:    Wound Image   01/19/24 1738   Wound WDL ex 01/19/24 1738   Dressing Appearance Intact;Dried drainage 01/19/24 1738   Drainage Amount Scant 01/19/24 1738   Drainage Characteristics/Odor Serosanguineous 01/19/24 1738   Appearance Epithelialization 01/19/24 1738   Tissue loss description Partial thickness 01/19/24 1738   Black (%), Wound Tissue Color 0 % 01/19/24 1738   Red (%), Wound Tissue Color 100 % 01/19/24 1738   Yellow (%), Wound Tissue Color 0 % 01/19/24 1738   Periwound Area Dry;Intact 01/19/24 1738   Wound Edges Open 01/19/24 1738   Wound Length (cm) 0.1 cm 01/19/24 1738   Wound Width (cm) 0.1 cm 01/19/24 1738   Wound Depth (cm) 0 cm 01/19/24 1738   Wound Volume (cm^3) 0 cm^3 01/19/24 1738    Wound Surface Area (cm^2) 0.01 cm^2 01/19/24 1738   Tunneling (depth (cm)/location) 0 01/19/24 1738   Undermining (depth (cm)/location) 0 01/19/24 1738   Care Cleansed with:;Antimicrobial agent;Sterile normal saline;Debrided 01/19/24 1738   Dressing Applied;Collagen;Hydrofiber;Island/border 01/19/24 1738   Periwound Care Skin barrier film applied;Absorptive dressing applied 01/19/24 1738   Compression Tubular elasticized bandage 01/19/24 1738   Off Loading Off loading shoe 01/19/24 1738   Dressing Change Due 01/26/24 01/19/24 1738             Plan:              Education about the prevention of limb loss.    Discussed wound healing cycle, skin integrity, ways to care for skin.Counseled patient on the effects of biomechanical pressure,  PVD, and blood glucose on healing. He verbalizes understanding that it can increase the chances of delayed healing and this prolonged exposure leads to infection or progression of infection which subsequently can result in loss of limb.    The site is cleansed of foreign material as much as possible, Left Lateral/Plantar Foot wound has callus around previous wound    Will trial being in new boot with bandage only.     Long-term goals include keeping the wound healed by good offloading and medical management under the direction of internist.      Procedures    Orders Placed This Encounter   Procedures    Change dressing     Wound Dressing Orders    Dressing change frequency once a week  Remove old dressing  Cleanse or irrigate with: Normal Saline  Protect periwound with:  periwound: Cavilon   Primary dressing - adjust to fit: WOUND BASE: Cellerate in thin layer to wound bed then Drawtex (cut to cover wound)  Secondary dressing: Tegafoam   Off-load: patient's Rx ankle brace and diabetic shoe          Follow up in about 1 week (around 1/26/2024) for Wound Care.

## 2024-01-26 ENCOUNTER — HOSPITAL ENCOUNTER (OUTPATIENT)
Dept: WOUND CARE | Facility: HOSPITAL | Age: 54
Discharge: HOME | End: 2024-01-26
Attending: PODIATRIST
Payer: MEDICARE

## 2024-01-26 VITALS
WEIGHT: 220 LBS | HEART RATE: 95 BPM | SYSTOLIC BLOOD PRESSURE: 178 MMHG | DIASTOLIC BLOOD PRESSURE: 98 MMHG | BODY MASS INDEX: 29.16 KG/M2 | TEMPERATURE: 98 F | HEIGHT: 73 IN

## 2024-01-26 DIAGNOSIS — Z89.421 H/O AMPUTATION OF LESSER TOE, RIGHT: ICD-10-CM

## 2024-01-26 DIAGNOSIS — Z89.422 HX OF AMPUTATION OF LESSER TOE, LEFT: ICD-10-CM

## 2024-01-26 DIAGNOSIS — S90.822A BLISTER (NONTHERMAL), LEFT FOOT, INITIAL ENCOUNTER: ICD-10-CM

## 2024-01-26 DIAGNOSIS — M21.072 EVERSION DEFORMITY OF FOOT, LEFT: ICD-10-CM

## 2024-01-26 DIAGNOSIS — E11.49 TYPE II DIABETES MELLITUS WITH NEUROLOGICAL MANIFESTATIONS: Primary | ICD-10-CM

## 2024-01-26 DIAGNOSIS — M21.6X2 EQUINUS DEFORMITY OF BOTH FEET: ICD-10-CM

## 2024-01-26 DIAGNOSIS — M21.6X1 EQUINUS DEFORMITY OF BOTH FEET: ICD-10-CM

## 2024-01-26 PROCEDURE — 99214 OFFICE O/P EST MOD 30 MIN: CPT | Mod: 25,,, | Performed by: PODIATRIST

## 2024-01-26 PROCEDURE — 11042 DBRDMT SUBQ TIS 1ST 20SQCM/<: CPT | Mod: ,,, | Performed by: PODIATRIST

## 2024-01-26 PROCEDURE — 11042 DBRDMT SUBQ TIS 1ST 20SQCM/<: CPT | Performed by: PODIATRIST

## 2024-01-26 NOTE — PROGRESS NOTES
[facility]                Progress Note    Subjective:       Patient ID: Indio Ryder Jr. is a 53 y.o. male.    Chief Complaint: Wound Check, Dressing Change, and Diabetic Foot Ulcer    Follow up wound care visit. Patient ambulated to exam room unaided with diabetic shoes on feet and Rx brace on Left Foot (inside shoe). He relates he feels his feet slipping and moving in the shoe. Denies fever, chills, nausea, vomiting or diarrhea. Dressing is intact to LLE, Tubigrip is well place, no strike through drainage. No c/o pain to wound bed at present.    Wound Check  There is no pain present.     Review of Systems   Constitutional:  Negative for appetite change, chills, fatigue and fever.   HENT:  Negative for hearing loss.    Eyes:  Negative for photophobia and visual disturbance.   Respiratory:  Negative for cough, chest tightness, shortness of breath and wheezing.    Cardiovascular:  Positive for leg swelling. Negative for chest pain and palpitations.   Gastrointestinal:  Negative for constipation, diarrhea, nausea and vomiting.   Endocrine: Negative for cold intolerance and heat intolerance.   Genitourinary:  Negative for flank pain.   Musculoskeletal:  Positive for gait problem and myalgias. Negative for neck pain and neck stiffness.   Skin:  Positive for wound. Negative for color change.   Neurological:  Positive for numbness. Negative for weakness, light-headedness and headaches.        + paresthesia    Psychiatric/Behavioral:  Negative for sleep disturbance.          Objective:        Physical Exam  Constitutional:       Appearance: He is well-developed.   Cardiovascular:      Comments: Dorsalis pedis and posterior tibial pulses are palpable Skin is atrophic, slightly hyperpigmented, and mildly edematous      Musculoskeletal:         General: No tenderness. Normal range of motion.      Comments: Muscle strength is 5/5 in all groups bilaterally.    S/p partial 5th  ray amp b/l    S/p hallux amp right    Fat pad atrophy to heels and met heads bilateral       Skin:     General: Skin is warm and dry.      Coloration: Skin is not jaundiced, mottled or sallow.      Findings: Wound (see below) present. No abscess or ecchymosis.      Comments:        Neurological:      Mental Status: He is alert and oriented to person, place, and time.      Comments: [place]-Nenita 5.07 monofilamant testing is diminished Kenji feet. Sharp/dull sensation diminished Bilaterally. Light touch absent Bilaterally.       Psychiatric:         Behavior: Behavior normal.         Vitals:    01/26/24 1003   BP: (!) 178/98   Pulse: 95   Temp: 97.7 °F (36.5 °C)       Assessment:           ICD-10-CM ICD-9-CM   1. Type II diabetes mellitus with neurological manifestations  E11.49 250.60   2. Blister (nonthermal), left foot, initial encounter  S90.822A 917.2   3. Hx of amputation of lesser toe, left  Z89.422 V49.72   4. H/O amputation of lesser toe, right  Z89.421 V49.72   5. Eversion deformity of foot, left  M21.072 736.79   6. Equinus deformity of both feet  M21.6X1 736.79    M21.6X2             (Retired) Wound 04/08/22 1137 Diabetic Ulcer Left plantar;lateral Foot (Active)   04/08/22 1137    Pre-existing: Yes   Primary Wound Type: Diabetic ulc   Side: Left   Orientation: plantar;lateral   Location: Foot   Wound Number:    Ankle-Brachial Index:    Pulses:    Removal Indication and Assessment:    Wound Outcome:    (Retired) Wound Type:    (Retired) Wound Length (cm):    (Retired) Wound Width (cm):    (Retired) Depth (cm):    Wound Description (Comments):    Removal Indications:    Wound Image    01/26/24 1558   Wound WDL ex 01/26/24 1558   Dressing Appearance Intact;Dry 01/26/24 1558   Drainage Amount None 01/26/24 1558   Appearance Blistered 01/26/24 1558   Tissue loss description Full thickness 01/26/24 1558   Black (%), Wound Tissue Color 0 % 01/26/24 1558   Red (%), Wound Tissue Color 100 % 01/26/24 1558    Yellow (%), Wound Tissue Color 0 % 01/26/24 1558   Periwound Area Pale white;Blistered 01/26/24 1558   Wound Edges Callused 01/26/24 1558   Wound Length (cm) 1 cm 01/26/24 1558   Wound Width (cm) 0.4 cm 01/26/24 1558   Wound Depth (cm) 0.2 cm 01/26/24 1558   Wound Volume (cm^3) 0.08 cm^3 01/26/24 1558   Wound Surface Area (cm^2) 0.4 cm^2 01/26/24 1558   Tunneling (depth (cm)/location) 0 01/26/24 1558   Undermining (depth (cm)/location) 0 01/26/24 1558   Care Cleansed with:;Antimicrobial agent;Sterile normal saline;Wound cleanser;Debrided 01/26/24 1558   Dressing Applied;Cast padding;Tubular bandage 01/26/24 1558   Periwound Care Moisture barrier applied 01/26/24 1558   Compression Tubular elasticized bandage 01/26/24 1558   Off Loading Football dressing;Off loading shoe 01/26/24 1558   Dressing Change Due 02/02/24 01/26/24 1558             Plan:              Education about the prevention of limb loss.    Discussed wound healing cycle, skin integrity, ways to care for skin.Counseled patient on the effects of biomechanical pressure,  PVD, and blood glucose on healing. He verbalizes understanding that it can increase the chances of delayed healing and this prolonged exposure leads to infection or progression of infection which subsequently can result in loss of limb.    The site is cleansed of foreign material as much as possible,     New onset left lateral plantar foot has intact blistered area.     Personally spoke with orthotist  concerning need for plantar lateral offloading in AFO. Patient given order to have custom insert made for Left Foot to help off-load.    Long-term goals include keeping the wound healed by good offloading and medical management under the direction of internist.      Wound Debridement    Date/Time: 1/26/2024 9:48 AM    Performed by: Marivel Peterson DPM  Authorized by: Marivel Peterson DPM      Wound Details:    Location:  Left foot    Location:  Left Midfoot    Type of Debridement:   "Excisional       Length (cm):  1       Area (sq cm):  0.4       Width (cm):  0.4       Percent Debrided (%):  100       Depth (cm):  0.2       Total Area Debrided (sq cm):  0.4    Depth of debridement:  Subcutaneous tissue    Tissue debrided:  Dermis, Epidermis and Subcutaneous    Devitalized tissue debrided:  Callus and Fibrin    Instruments:  Curette  Bleeding:  Minimal  Hemostasis Achieved: Yes  Method Used:  Pressure  Patient tolerance:  Patient tolerated the procedure well with no immediate complications      Orders Placed This Encounter   Procedures    Debridement     This order was created via procedure documentation     Standing Status:   Standing     Number of Occurrences:   1    ORTHOTIC DEVICE (DME)     AFO needs offloading to lateral midfoot due to history of wound, osteomyelitis, blister, deformity    Shoes may also need heel counter addition or adjustment due to patient shuffling and deformity     Order Specific Question:   Type of Orthotic to be filled?     Answer:   Other (please specify)     Order Specific Question:   Height:     Answer:   6' 1" (1.854 m)     Order Specific Question:   Weight:     Answer:   99.8 kg (220 lb 0.3 oz)    Change dressing     Wound Dressing Orders     Dressing change frequency once a week or prn nurse visit  Remove old dressing   Cleanse or irrigate with: Normal Saline   Protect periwound with:  periwound: Cavilon/Benzoin, Gentian violet hugging wound bed   Primary dressing - adjust to fit: WOUND BASE: Endoform/Iodoflex (confetti mix) to wound bed then Mextra (5" x 5")   Secondary dressing: Full custom felt pad to plantar foot, with large hole open to allow for drainage. Abram Foam Long x 1  Off-load: Football dressing (cast padding x 2 rolls), Tubigrip Size E, single layer, Darco shoe. Patient to apply Defender Boot. Knee scooter for LLE to off-load          Follow up in about 1 week (around 2/2/2024) for Wound Care.       "

## 2024-02-01 ENCOUNTER — TELEPHONE (OUTPATIENT)
Dept: WOUND CARE | Facility: HOSPITAL | Age: 54
End: 2024-02-01
Payer: MEDICARE

## 2024-02-01 NOTE — TELEPHONE ENCOUNTER
----- Message from Nieves Pillo sent at 2/1/2024  4:51 PM CST -----  Regarding: FW: patient call back  I'm guessing he means at this location...  ----- Message -----  From: Trinidad Prather  Sent: 2/1/2024   4:35 PM CST  To: Kristen Orta Staff  Subject: patient call back                                Type: Patient Call Back    Who called: Self     What is the request in detail: Asked if he can come in for 7 am tomorrow morning.     Can the clinic reply by [facility]? No     Would the patient rather a call back or a response via [facility]? Call     Best call back number: [phone]

## 2024-02-02 ENCOUNTER — HOSPITAL ENCOUNTER (OUTPATIENT)
Dept: WOUND CARE | Facility: HOSPITAL | Age: 54
Discharge: HOME | End: 2024-02-02
Attending: PODIATRIST
Payer: MEDICARE

## 2024-02-02 VITALS — DIASTOLIC BLOOD PRESSURE: 67 MMHG | TEMPERATURE: 98 F | SYSTOLIC BLOOD PRESSURE: 145 MMHG | HEART RATE: 87 BPM

## 2024-02-02 DIAGNOSIS — E11.621 DIABETIC ULCER OF LEFT MIDFOOT ASSOCIATED WITH TYPE 2 DIABETES MELLITUS, WITH FAT LAYER EXPOSED: ICD-10-CM

## 2024-02-02 DIAGNOSIS — E11.49 TYPE II DIABETES MELLITUS WITH NEUROLOGICAL MANIFESTATIONS: Primary | ICD-10-CM

## 2024-02-02 DIAGNOSIS — L97.422 DIABETIC ULCER OF LEFT MIDFOOT ASSOCIATED WITH TYPE 2 DIABETES MELLITUS, WITH FAT LAYER EXPOSED: ICD-10-CM

## 2024-02-02 PROCEDURE — 99499 UNLISTED E&M SERVICE: CPT | Mod: ,,, | Performed by: PODIATRIST

## 2024-02-02 PROCEDURE — 11042 DBRDMT SUBQ TIS 1ST 20SQCM/<: CPT | Mod: ,,, | Performed by: PODIATRIST

## 2024-02-02 PROCEDURE — 11042 DBRDMT SUBQ TIS 1ST 20SQCM/<: CPT | Performed by: PODIATRIST

## 2024-02-02 NOTE — PROGRESS NOTES
[facility]                Progress Note    Subjective:       Patient ID: Indio Ryder Jr. is a 53 y.o. male.    Chief Complaint: Wound Check, Dressing Change, and Diabetic Foot Ulcer    Follow up wound care visit. Patient ambulated to exam room unaided with diabetic shoe on Right Foot and Defender Boot on Left Foot. Denies fever, chills, nausea, vomiting or diarrhea. Dressing is intact to LLE, Tubigrip is well place, no strike through drainage. No c/o pain to wound bed at present. 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    Wound Check  There is no pain present.     Review of Systems   Constitutional:  Negative for appetite change, chills, fatigue and fever.   HENT:  Negative for hearing loss.    Eyes:  Negative for photophobia and visual disturbance.   Respiratory:  Negative for cough, chest tightness, shortness of breath and wheezing.    Cardiovascular:  Positive for leg swelling. Negative for chest pain and palpitations.   Gastrointestinal:  Negative for constipation, diarrhea, nausea and vomiting.   Endocrine: Negative for cold intolerance and heat intolerance.   Genitourinary:  Negative for flank pain.   Musculoskeletal:  Positive for gait problem and myalgias. Negative for neck pain and neck stiffness.   Skin:  Positive for wound. Negative for color change.   Neurological:  Positive for numbness. Negative for weakness, light-headedness and headaches.        + paresthesia    Psychiatric/Behavioral:  Negative for sleep disturbance.          Objective:        Physical Exam  Constitutional:       Appearance: He is well-developed.   Cardiovascular:      Comments: Dorsalis pedis and posterior tibial pulses are palpable Skin is atrophic, slightly hyperpigmented, and mildly edematous      Musculoskeletal:         General: No tenderness. Normal range of motion.      Comments: Muscle strength is 5/5 in all groups  bilaterally.    S/p partial 5th ray amp b/l    S/p hallux amp right    Fat pad atrophy to heels and met heads bilateral       Skin:     General: Skin is warm and dry.      Coloration: Skin is not jaundiced, mottled or sallow.      Findings: Wound (see below) present. No abscess or ecchymosis.      Comments:        Neurological:      Mental Status: He is alert and oriented to person, place, and time.      Comments: [place]-Nenita 5.07 monofilamant testing is diminished Kenji feet. Sharp/dull sensation diminished Bilaterally. Light touch absent Bilaterally.       Psychiatric:         Behavior: Behavior normal.         Vitals:    02/02/24 0714   BP: (!) 145/67   Pulse: 87   Temp: 97.5 °F (36.4 °C)       Assessment:           ICD-10-CM ICD-9-CM   1. Type II diabetes mellitus with neurological manifestations  E11.49 250.60   2. Diabetic ulcer of left midfoot associated with type 2 diabetes mellitus, with fat layer exposed  E11.621 250.80    L97.422 707.14            (Retired) Wound 04/08/22 1137 Diabetic Ulcer Left plantar;lateral Foot (Active)   04/08/22 1137    Pre-existing: Yes   Primary Wound Type: Diabetic ulc   Side: Left   Orientation: plantar;lateral   Location: Foot   Wound Number:    Ankle-Brachial Index:    Pulses:    Removal Indication and Assessment:    Wound Outcome:    (Retired) Wound Type:    (Retired) Wound Length (cm):    (Retired) Wound Width (cm):    (Retired) Depth (cm):    Wound Description (Comments):    Removal Indications:    Wound Image     02/02/24 1158   Wound WDL ex 02/02/24 1158   Dressing Appearance Intact;Moist drainage 02/02/24 1158   Drainage Amount Moderate 02/02/24 1158   Drainage Characteristics/Odor Serosanguineous;No odor 02/02/24 1158   Appearance Red;Moist 02/02/24 1158   Tissue loss description Full thickness 02/02/24 1158   Black (%), Wound Tissue Color 0 % 02/02/24 1158   Red (%), Wound Tissue Color 100 % 02/02/24 1158   Yellow (%), Wound Tissue Color 0 % 02/02/24 1158    Periwound Area Pale white 02/02/24 1158   Wound Edges Callused;Defined;Open 02/02/24 1158   Wound Length (cm) 1 cm 02/02/24 1158   Wound Width (cm) 0.4 cm 02/02/24 1158   Wound Depth (cm) 0.2 cm 02/02/24 1158   Wound Volume (cm^3) 0.08 cm^3 02/02/24 1158   Wound Surface Area (cm^2) 0.4 cm^2 02/02/24 1158   Tunneling (depth (cm)/location) 0 02/02/24 1158   Undermining (depth (cm)/location) 0 02/02/24 1158   Care Cleansed with:;Antimicrobial agent;Sterile normal saline;Wound cleanser 02/02/24 1158   Dressing Changed 02/02/24 1158   Periwound Care Moisture barrier applied 02/02/24 1158   Compression Tubular elasticized bandage 02/02/24 1158   Off Loading Football dressing;Off loading shoe 02/02/24 1158   Dressing Change Due 02/09/24 02/02/24 1158             Plan:              Education about the prevention of limb loss.    Discussed wound healing cycle, skin integrity, ways to care for skin.Counseled patient on the effects of biomechanical pressure,  PVD, and blood glucose on healing. He verbalizes understanding that it can increase the chances of delayed healing and this prolonged exposure leads to infection or progression of infection which subsequently can result in loss of limb.    The site is cleansed of foreign material as much as possible,     Long-term goals include keeping the wound healed by good offloading and medical management under the direction of internist.      Wound Debridement    Date/Time: 2/2/2024 7:02 AM    Performed by: Marivel Peterson DPM  Authorized by: Marivel Peterson DPM      Wound Details:    Location:  Left foot    Location:  Left Midfoot    Type of Debridement:  Excisional       Length (cm):  1       Area (sq cm):  0.4       Width (cm):  0.4       Percent Debrided (%):  100       Depth (cm):  0.2       Total Area Debrided (sq cm):  0.4    Depth of debridement:  Subcutaneous tissue    Tissue debrided:  Dermis, Epidermis and Subcutaneous    Devitalized tissue debrided:  Callus and  "Fibrin    Instruments:  Curette  Bleeding:  Minimal  Hemostasis Achieved: Yes  Method Used:  Pressure  Patient tolerance:  Patient tolerated the procedure well with no immediate complications      Orders Placed This Encounter   Procedures    Debridement     This order was created via procedure documentation     Standing Status:   Standing     Number of Occurrences:   1    Change dressing     Wound Dressing Orders     Dressing change frequency once a week or prn nurse visit   Remove old dressing   Cleanse or irrigate with: Normal Saline   Protect periwound with:  periwound: Cavilon/Benzoin, Gentian violet hugging wound bed   Primary dressing - adjust to fit: WOUND BASE: [place] to wound bed   Secondary dressing: Full custom foam pad (with custom pad to add offloading) to plantar foot, with large hole open to allow for drainage then Mextra (5" x 5"). Abram Foam Long x 1   Off-load: Football dressing (cast padding x 2 rolls), Tubigrip Size E, single layer. Defender Boot. Knee scooter for LLE to off-load          Follow up in about 1 week (around 2/9/2024) for Wound Care.       "

## 2024-02-09 ENCOUNTER — HOSPITAL ENCOUNTER (OUTPATIENT)
Dept: WOUND CARE | Facility: HOSPITAL | Age: 54
Discharge: HOME | End: 2024-02-09
Attending: PODIATRIST
Payer: MEDICARE

## 2024-02-09 VITALS
WEIGHT: 220 LBS | HEIGHT: 73 IN | DIASTOLIC BLOOD PRESSURE: 88 MMHG | SYSTOLIC BLOOD PRESSURE: 131 MMHG | HEART RATE: 90 BPM | BODY MASS INDEX: 29.16 KG/M2

## 2024-02-09 DIAGNOSIS — E11.621 DIABETIC ULCER OF LEFT MIDFOOT ASSOCIATED WITH TYPE 2 DIABETES MELLITUS, WITH FAT LAYER EXPOSED: ICD-10-CM

## 2024-02-09 DIAGNOSIS — E11.49 TYPE II DIABETES MELLITUS WITH NEUROLOGICAL MANIFESTATIONS: Primary | ICD-10-CM

## 2024-02-09 DIAGNOSIS — L97.422 DIABETIC ULCER OF LEFT MIDFOOT ASSOCIATED WITH TYPE 2 DIABETES MELLITUS, WITH FAT LAYER EXPOSED: ICD-10-CM

## 2024-02-09 PROCEDURE — 99499 UNLISTED E&M SERVICE: CPT | Mod: ,,, | Performed by: PODIATRIST

## 2024-02-09 PROCEDURE — 11042 DBRDMT SUBQ TIS 1ST 20SQCM/<: CPT | Performed by: PODIATRIST

## 2024-02-09 PROCEDURE — 11042 DBRDMT SUBQ TIS 1ST 20SQCM/<: CPT | Mod: ,,, | Performed by: PODIATRIST

## 2024-02-09 NOTE — PROGRESS NOTES
[facility]                Progress Note    Subjective:       Patient ID: Indio Ryder Jr. is a 53 y.o. male.    Chief Complaint: Wound Check, Dressing Change, and Diabetic Foot Ulcer    Follow up wound care visit. Patient ambulated to exam room unaided with diabetic shoe on Right Foot and Defender Boot on Left Foot. Denies fever, chills, nausea, vomiting or diarrhea. Dressing is intact to LLE, Tubigrip is well place, no strike through drainage. No c/o pain to wound bed at present.     Wound Check  There is no pain present.     Review of Systems   Constitutional:  Negative for appetite change, chills, fatigue and fever.   HENT:  Negative for hearing loss.    Eyes:  Negative for photophobia and visual disturbance.   Respiratory:  Negative for cough, chest tightness, shortness of breath and wheezing.    Cardiovascular:  Positive for leg swelling. Negative for chest pain and palpitations.   Gastrointestinal:  Negative for constipation, diarrhea, nausea and vomiting.   Endocrine: Negative for cold intolerance and heat intolerance.   Genitourinary:  Negative for flank pain.   Musculoskeletal:  Positive for gait problem and myalgias. Negative for neck pain and neck stiffness.   Skin:  Positive for wound. Negative for color change.   Neurological:  Positive for numbness. Negative for weakness, light-headedness and headaches.        + paresthesia    Psychiatric/Behavioral:  Negative for sleep disturbance.          Objective:        Physical Exam  Constitutional:       Appearance: He is well-developed.   Cardiovascular:      Comments: Dorsalis pedis and posterior tibial pulses are palpable Skin is atrophic, slightly hyperpigmented, and mildly edematous      Musculoskeletal:         General: No tenderness. Normal range of motion.      Comments: Muscle strength is 5/5 in all groups bilaterally.    S/p partial 5th ray amp b/l    S/p hallux amp right    Fat pad atrophy to heels  and met heads bilateral       Skin:     General: Skin is warm and dry.      Coloration: Skin is not jaundiced, mottled or sallow.      Findings: Wound (see below) present. No abscess or ecchymosis.      Comments:        Neurological:      Mental Status: He is alert and oriented to person, place, and time.      Comments: [place]-Nenita 5.07 monofilamant testing is diminished Kenji feet. Sharp/dull sensation diminished Bilaterally. Light touch absent Bilaterally.       Psychiatric:         Behavior: Behavior normal.         Vitals:    02/09/24 1118   BP: 131/88   Pulse: 90       Assessment:           ICD-10-CM ICD-9-CM   1. Type II diabetes mellitus with neurological manifestations  E11.49 250.60   2. Diabetic ulcer of left midfoot associated with type 2 diabetes mellitus, with fat layer exposed  E11.621 250.80    L97.422 707.14            (Retired) Wound 04/08/22 1137 Diabetic Ulcer Left plantar;lateral Foot (Active)   04/08/22 1137    Pre-existing: Yes   Primary Wound Type: Diabetic ulc   Side: Left   Orientation: plantar;lateral   Location: Foot   Wound Number:    Ankle-Brachial Index:    Pulses:    Removal Indication and Assessment:    Wound Outcome:    (Retired) Wound Type:    (Retired) Wound Length (cm):    (Retired) Wound Width (cm):    (Retired) Depth (cm):    Wound Description (Comments):    Removal Indications:    Wound Image   02/09/24 1600   Wound WDL ex 02/09/24 1600   Dressing Appearance Intact;Dried drainage 02/09/24 1600   Drainage Amount Moderate 02/09/24 1600   Drainage Characteristics/Odor Serosanguineous 02/09/24 1600   Appearance Red;Moist 02/09/24 1600   Tissue loss description Full thickness 02/09/24 1600   Black (%), Wound Tissue Color 0 % 02/09/24 1600   Red (%), Wound Tissue Color 100 % 02/09/24 1600   Yellow (%), Wound Tissue Color 0 % 02/09/24 1600   Periwound Area Dry 02/09/24 1600   Wound Edges Callused;Defined;Open 02/09/24 1600   Wound Length (cm) 0.8 cm 02/09/24 1600   Wound Width (cm)  0.4 cm 02/09/24 1600   Wound Depth (cm) 0.1 cm 02/09/24 1600   Wound Volume (cm^3) 0.032 cm^3 02/09/24 1600   Wound Surface Area (cm^2) 0.32 cm^2 02/09/24 1600   Tunneling (depth (cm)/location) 0 02/09/24 1600   Undermining (depth (cm)/location) 0 02/09/24 1600   Care Cleansed with:;Antimicrobial agent;Sterile normal saline;Wound cleanser 02/09/24 1600   Dressing Changed 02/09/24 1600   Periwound Care Moisture barrier applied;Absorptive dressing applied 02/09/24 1600   Compression Tubular elasticized bandage 02/09/24 1600   Off Loading Football dressing;Off loading shoe 02/09/24 1600   Dressing Change Due 02/16/24 02/09/24 1600             Plan:              Education about the prevention of limb loss.    Discussed wound healing cycle, skin integrity, ways to care for skin.Counseled patient on the effects of biomechanical pressure,  PVD, and blood glucose on healing. He verbalizes understanding that it can increase the chances of delayed healing and this prolonged exposure leads to infection or progression of infection which subsequently can result in loss of limb.    The site is cleansed of foreign material as much as possible,     Long-term goals include keeping the wound healed by good offloading and medical management under the direction of internist.      Wound Debridement    Date/Time: 2/9/2024 7:40 AM    Performed by: Marivel Peterson DPM  Authorized by: Marivel Peterson DPM      Wound Details:    Location:  Left foot    Location:  Left Midfoot    Type of Debridement:  Excisional       Length (cm):  0.8       Area (sq cm):  0.32       Width (cm):  0.4       Percent Debrided (%):  100       Depth (cm):  0.1       Total Area Debrided (sq cm):  0.32    Depth of debridement:  Subcutaneous tissue    Tissue debrided:  Epidermis, Dermis and Subcutaneous    Devitalized tissue debrided:  Callus    Instruments:  Curette  Bleeding:  Minimal  Hemostasis Achieved: Yes  Method Used:  Pressure  Patient tolerance:  Patient  "tolerated the procedure well with no immediate complications      Orders Placed This Encounter   Procedures    Debridement     This order was created via procedure documentation     Standing Status:   Standing     Number of Occurrences:   1    Change dressing     Wound Dressing Orders     Dressing change frequency once a week or prn nurse visit   Remove old dressing   Cleanse or irrigate with: Normal Saline   Protect periwound with:  periwound: Cavilon/Benzoin, Gentian violet hugging wound bed   Primary dressing - adjust to fit: WOUND BASE: Cellerate to wound bed   Secondary dressing: Full custom felt pad (with custom pad to add offloading) to plantar foot, with large hole open to allow for drainage then Mextra (5" x 5").   Off-load: Football dressing (cast padding x 2 rolls), Tubigrip Size E, single layer. Defender Boot. Knee scooter for LLE to off-load          Follow up in about 1 week (around 2/16/2024) for Wound Care.       "

## 2024-02-16 ENCOUNTER — HOSPITAL ENCOUNTER (OUTPATIENT)
Dept: WOUND CARE | Facility: HOSPITAL | Age: 54
Discharge: HOME | End: 2024-02-16
Attending: PODIATRIST
Payer: MEDICARE

## 2024-02-16 VITALS
WEIGHT: 220 LBS | TEMPERATURE: 98 F | HEIGHT: 73 IN | SYSTOLIC BLOOD PRESSURE: 142 MMHG | HEART RATE: 74 BPM | BODY MASS INDEX: 29.16 KG/M2 | DIASTOLIC BLOOD PRESSURE: 70 MMHG

## 2024-02-16 DIAGNOSIS — E11.621 DIABETIC ULCER OF LEFT MIDFOOT ASSOCIATED WITH TYPE 2 DIABETES MELLITUS, WITH FAT LAYER EXPOSED: ICD-10-CM

## 2024-02-16 DIAGNOSIS — E11.49 TYPE II DIABETES MELLITUS WITH NEUROLOGICAL MANIFESTATIONS: Primary | ICD-10-CM

## 2024-02-16 DIAGNOSIS — S90.822S BLISTER (NONTHERMAL), LEFT FOOT, SEQUELA: ICD-10-CM

## 2024-02-16 DIAGNOSIS — L97.422 DIABETIC ULCER OF LEFT MIDFOOT ASSOCIATED WITH TYPE 2 DIABETES MELLITUS, WITH FAT LAYER EXPOSED: ICD-10-CM

## 2024-02-16 PROCEDURE — 99499 UNLISTED E&M SERVICE: CPT | Mod: ,,, | Performed by: PODIATRIST

## 2024-02-16 PROCEDURE — 11042 DBRDMT SUBQ TIS 1ST 20SQCM/<: CPT | Mod: ,,, | Performed by: PODIATRIST

## 2024-02-16 PROCEDURE — 11042 DBRDMT SUBQ TIS 1ST 20SQCM/<: CPT | Performed by: PODIATRIST

## 2024-02-16 NOTE — PROGRESS NOTES
[facility]                Progress Note    Subjective:       Patient ID: Indio Ryder Jr. is a 53 y.o. male.    Chief Complaint: Wound Check, Dressing Change, and Diabetic Foot Ulcer    Follow up wound care visit. Patient ambulated to exam room unaided with diabetic shoe on Right Foot with shoe lift and Defender Boot on Left Foot. Denies fever, chills, nausea, vomiting or diarrhea. Dressing is intact to LLE, Tubigrip is well place, no strike through drainage. No c/o pain to wound bed at present. Patient admits to a significant amount of ambulation in defender boot since our last encounter.  He relates difficulty with trying to concentrate pressure to the heel in defender boot and feels as though he may be coming down hard on the left foot while ambulating.      Wound Check  There is no pain present.     Review of Systems   Constitutional:  Negative for appetite change, chills, fatigue and fever.   HENT:  Negative for hearing loss.    Eyes:  Negative for photophobia and visual disturbance.   Respiratory:  Negative for cough, chest tightness, shortness of breath and wheezing.    Cardiovascular:  Positive for leg swelling. Negative for chest pain and palpitations.   Gastrointestinal:  Negative for constipation, diarrhea, nausea and vomiting.   Endocrine: Negative for cold intolerance and heat intolerance.   Genitourinary:  Negative for flank pain.   Musculoskeletal:  Positive for gait problem and myalgias. Negative for neck pain and neck stiffness.   Skin:  Positive for wound. Negative for color change.   Neurological:  Positive for numbness. Negative for weakness, light-headedness and headaches.        + paresthesia    Psychiatric/Behavioral:  Negative for sleep disturbance.          Objective:        Physical Exam  Constitutional:       Appearance: He is well-developed.   Cardiovascular:      Comments: Dorsalis pedis and posterior tibial pulses are palpable Skin is  atrophic, slightly hyperpigmented, and mildly edematous      Musculoskeletal:         General: No tenderness. Normal range of motion.      Comments: Muscle strength is 5/5 in all groups bilaterally.    S/p partial 5th ray amp b/l    S/p hallux amp right    Fat pad atrophy to heels and met heads bilateral       Skin:     General: Skin is warm and dry.      Coloration: Skin is not jaundiced, mottled or sallow.      Findings: Wound (see below) present. No abscess or ecchymosis.      Comments:        Neurological:      Mental Status: He is alert and oriented to person, place, and time.      Comments: [place]-Nenita 5.07 monofilamant testing is diminished Kenji feet. Sharp/dull sensation diminished Bilaterally. Light touch absent Bilaterally.       Psychiatric:         Behavior: Behavior normal.         Vitals:    02/16/24 0736   BP: (!) 142/70   Pulse: 74   Temp: 97.8 °F (36.6 °C)       Assessment:           ICD-10-CM ICD-9-CM   1. Type II diabetes mellitus with neurological manifestations  E11.49 250.60   2. Diabetic ulcer of left midfoot associated with type 2 diabetes mellitus, with fat layer exposed  E11.621 250.80    L97.422 707.14   3. Blister (nonthermal), left foot, sequela  S90.822S 906.2            (Retired) Wound 04/08/22 1137 Diabetic Ulcer Left plantar;lateral Foot (Active)   04/08/22 1137    Pre-existing: Yes   Primary Wound Type: Diabetic ulc   Side: Left   Orientation: plantar;lateral   Location: Foot   Wound Number:    Ankle-Brachial Index:    Pulses:    Removal Indication and Assessment:    Wound Outcome:    (Retired) Wound Type:    (Retired) Wound Length (cm):    (Retired) Wound Width (cm):    (Retired) Depth (cm):    Wound Description (Comments):    Removal Indications:    Wound Image   02/16/24 1441   Wound WDL ex 02/16/24 1441   Dressing Appearance Intact;Moist drainage 02/16/24 1441   Drainage Amount Moderate 02/16/24 1441   Drainage Characteristics/Odor Serosanguineous;No odor 02/16/24 1441    Appearance Red;Moist;Blistered 02/16/24 1441   Tissue loss description Full thickness 02/16/24 1441   Black (%), Wound Tissue Color 0 % 02/16/24 1441   Red (%), Wound Tissue Color 10 % 02/16/24 1441   Yellow (%), Wound Tissue Color 0 % 02/16/24 1441   Periwound Area Pale white 02/16/24 1441   Wound Edges Defined 02/16/24 1441   Wound Length (cm) 6.3 cm 02/16/24 1441   Wound Width (cm) 2 cm 02/16/24 1441   Wound Depth (cm) 0.2 cm 02/16/24 1441   Wound Volume (cm^3) 2.52 cm^3 02/16/24 1441   Wound Surface Area (cm^2) 12.6 cm^2 02/16/24 1441   Tunneling (depth (cm)/location) 0 02/16/24 1441   Undermining (depth (cm)/location) 0 02/16/24 1441   Care Cleansed with:;Antimicrobial agent;Sterile normal saline;Debrided 02/16/24 1441   Dressing Applied;Cast padding;Tubular bandage 02/16/24 1441   Periwound Care Moisture barrier applied 02/16/24 1441   Compression Tubular elasticized bandage 02/16/24 1441   Off Loading Football dressing;Off loading shoe 02/16/24 1441   Dressing Change Due 02/20/24 02/16/24 1441             Plan:              Education about the prevention of limb loss.    Discussed wound healing cycle, skin integrity, ways to care for skin.Counseled patient on the effects of biomechanical pressure,  PVD, and blood glucose on healing. He verbalizes understanding that it can increase the chances of delayed healing and this prolonged exposure leads to infection or progression of infection which subsequently can result in loss of limb.    The site is cleansed of foreign material as much as possible,     Left Plantar Foot has new blister noted around previous wound bed.     Patient instructed to reinitiate  knee scooter daily.    Long-term goals include keeping the wound healed by good offloading and medical management under the direction of internist.      Debridement    Date/Time: 2/16/2024 7:16 AM    Performed by: Marivel Peterson DPM  Authorized by: Marivel Peterson DPM    Local anesthesia used?: No      Wound  "Details:    Location:  Left foot    Location:  Left Midfoot    Type of Debridement:  Excisional       Length (cm):  6.3       Area (sq cm):  12.6       Width (cm):  2       Percent Debrided (%):  100       Depth (cm):  0.2       Total Area Debrided (sq cm):  12.6    Depth of debridement:  Subcutaneous tissue    Tissue debrided:  Dermis, Epidermis and Subcutaneous    Devitalized tissue debrided:  Callus and Fibrin    Instruments:  Curette  Bleeding:  Minimal  Hemostasis Achieved: Yes  Method Used:  Pressure  Patient tolerance:  Patient tolerated the procedure well with no immediate complications      Orders Placed This Encounter   Procedures    Debridement     This order was created via procedure documentation     Standing Status:   Standing     Number of Occurrences:   1    Change dressing     Wound Dressing Orders    Dressing change frequency twice a week (prn nurse visit)  Remove old dressing  Cleanse or irrigate with: Normal Saline  Protect periwound with:  periwound: Gentian Violet   Primary dressing - adjust to fit: WOUND BASE: Cadexomer Iodine Pad and Endoform (Confetti Mix) applied to wound bed  Secondary dressing: Drawtex: size used: 4" x 4" cut to size of wound bed (stacked in 2 layers), then Mextra: size used: 5" x 9" (laid vertically), and Non-Bordered Foam (Mepliex, Abram n/a) to off-load  Compression/Off-load: Football (cast padding x 3 rolls), Tubigrip (single layer, size E) then Coban to secure. Defender shoe on the affected foot    Change at Nurse Visit          Follow up in about 4 days (around 2/20/2024) for Nurse Visit.       "

## 2024-02-20 ENCOUNTER — HOSPITAL ENCOUNTER (OUTPATIENT)
Dept: WOUND CARE | Facility: HOSPITAL | Age: 54
Discharge: HOME | End: 2024-02-20
Attending: PODIATRIST
Payer: MEDICARE

## 2024-02-20 VITALS — TEMPERATURE: 98 F | DIASTOLIC BLOOD PRESSURE: 79 MMHG | HEART RATE: 95 BPM | SYSTOLIC BLOOD PRESSURE: 132 MMHG

## 2024-02-20 DIAGNOSIS — L97.422 DIABETIC ULCER OF LEFT MIDFOOT ASSOCIATED WITH TYPE 2 DIABETES MELLITUS, WITH FAT LAYER EXPOSED: ICD-10-CM

## 2024-02-20 DIAGNOSIS — E11.621 DIABETIC ULCER OF LEFT MIDFOOT ASSOCIATED WITH TYPE 2 DIABETES MELLITUS, WITH FAT LAYER EXPOSED: ICD-10-CM

## 2024-02-20 DIAGNOSIS — E11.49 TYPE II DIABETES MELLITUS WITH NEUROLOGICAL MANIFESTATIONS: Primary | ICD-10-CM

## 2024-02-20 PROCEDURE — 99213 OFFICE O/P EST LOW 20 MIN: CPT

## 2024-02-20 NOTE — PROGRESS NOTES
"[facility]  [address]   [place]  [id number]  Nurse Visit    Subjective:       Patient seen in clinic today. Patient ambulated to exam room unaided with diabetic shoe w/lift on Right Foot and Defender Boot on Left Foot. Denies fever, chills, nausea, vomiting or diarrhea. No c/o pain to wound bed at present. Dressing is intact to LLE, Tubigrip is well place, no strike through drainage. He reports using knee scooter over the weekend "but I left it in the other van today". States he will retrieve knee scooter for use after visit. Dressing changed as ordered.      Assessment:          (Retired) Wound 04/08/22 1137 Diabetic Ulcer Left plantar;lateral Foot (Active)   04/08/22 1137    Pre-existing: Yes   Primary Wound Type: Diabetic ulc   Side: Left   Orientation: plantar;lateral   Location: Foot   Wound Number:    Ankle-Brachial Index:    Pulses:    Removal Indication and Assessment:    Wound Outcome:    (Retired) Wound Type:    (Retired) Wound Length (cm):    (Retired) Wound Width (cm):    (Retired) Depth (cm):    Wound Description (Comments):    Removal Indications:    Wound WDL ex 02/20/24 0958   Dressing Appearance Intact;Moist drainage 02/20/24 0958   Drainage Amount Large 02/20/24 0958   Drainage Characteristics/Odor Serosanguineous;No odor 02/20/24 0958   Appearance Red;Moist 02/20/24 0958   Tissue loss description Full thickness 02/20/24 0958   Black (%), Wound Tissue Color 0 % 02/20/24 0958   Red (%), Wound Tissue Color 100 % 02/20/24 0958   Yellow (%), Wound Tissue Color 0 % 02/20/24 0958   Periwound Area Blistered;Pale white;Moist 02/20/24 0958   Wound Edges Defined;Open 02/20/24 0958   Care Cleansed with:;Antimicrobial agent;Sterile normal saline 02/20/24 0958   Dressing Applied;Cast padding;Tubular bandage 02/20/24 0958   Periwound Care Moisture barrier applied;Absorptive dressing applied 02/20/24 0958   Compression Tubular elasticized bandage 02/20/24 0958   Off Loading " "Football dressing;Off loading shoe 02/20/24 0958   Dressing Change Due 02/23/24 02/20/24 0958           Plan:     Wound Dressing Orders    Dressing change frequency twice a week (prn nurse visit)  Remove old dressing  Cleanse or irrigate with: Normal Saline  Protect periwound with:  periwound: Gentian Violet  Primary dressing - adjust to fit: WOUND BASE: Cadexomer Iodine Pad and Endoform (Confetti Mix) applied to wound bed  Secondary dressing: Drawtex: size used: 4" x 4" cut to size of wound bed (stacked in 2 layers), then Mextra: size used: 5" x 9" (laid vertically), and Non-Bordered Foam (Mepilex, Abram n/a) to off-load  Compression/Off-load: Football (cast padding x 3 rolls), Tubigrip (single layer, size E) then Coban to secure. Defender shoe on the affected foot             Follow up in about 3 days (around 2/23/2024) for Wound Care.          "

## 2024-02-23 ENCOUNTER — HOSPITAL ENCOUNTER (OUTPATIENT)
Dept: WOUND CARE | Facility: HOSPITAL | Age: 54
Discharge: HOME | End: 2024-02-23
Attending: PODIATRIST
Payer: MEDICARE

## 2024-02-23 VITALS
WEIGHT: 220 LBS | SYSTOLIC BLOOD PRESSURE: 144 MMHG | BODY MASS INDEX: 29.16 KG/M2 | HEIGHT: 73 IN | DIASTOLIC BLOOD PRESSURE: 86 MMHG | TEMPERATURE: 98 F | HEART RATE: 97 BPM

## 2024-02-23 DIAGNOSIS — L97.422 DIABETIC ULCER OF LEFT MIDFOOT ASSOCIATED WITH TYPE 2 DIABETES MELLITUS, WITH FAT LAYER EXPOSED: ICD-10-CM

## 2024-02-23 DIAGNOSIS — E11.621 DIABETIC ULCER OF LEFT MIDFOOT ASSOCIATED WITH TYPE 2 DIABETES MELLITUS, WITH FAT LAYER EXPOSED: ICD-10-CM

## 2024-02-23 DIAGNOSIS — S90.822S BLISTER (NONTHERMAL), LEFT FOOT, SEQUELA: ICD-10-CM

## 2024-02-23 DIAGNOSIS — E11.49 TYPE II DIABETES MELLITUS WITH NEUROLOGICAL MANIFESTATIONS: Primary | ICD-10-CM

## 2024-02-23 PROCEDURE — 11042 DBRDMT SUBQ TIS 1ST 20SQCM/<: CPT | Performed by: PODIATRIST

## 2024-02-23 PROCEDURE — 11042 DBRDMT SUBQ TIS 1ST 20SQCM/<: CPT | Mod: ,,, | Performed by: PODIATRIST

## 2024-02-23 PROCEDURE — 99499 UNLISTED E&M SERVICE: CPT | Mod: ,,, | Performed by: PODIATRIST

## 2024-02-23 NOTE — PROGRESS NOTES
"[facility]                Progress Note    Subjective:       Patient ID: Indio Ryder Jr. is a 53 y.o. male.    Chief Complaint: Non-healing Wound Follow Up    Follow up wound care visit. Patient ambulated to exam room using knee scooter for the left foot with diabetic shoe w/lift on Right Foot and Defender Boot on Left Foot. No c/o pain to wound bed at present. Denies fever, chills, nausea, vomiting or diarrhea. Dressing is intact to LLE, Tubigrip is well place, with strike through drainage. Patient states "he has been walking on his heel to keep from putting pressure on the wound."     Wound Check  There is no pain present.     Review of Systems   Constitutional:  Negative for appetite change, chills, fatigue and fever.   HENT:  Negative for hearing loss.    Eyes:  Negative for photophobia and visual disturbance.   Respiratory:  Negative for cough, chest tightness, shortness of breath and wheezing.    Cardiovascular:  Positive for leg swelling. Negative for chest pain and palpitations.   Gastrointestinal:  Negative for constipation, diarrhea, nausea and vomiting.   Endocrine: Negative for cold intolerance and heat intolerance.   Genitourinary:  Negative for flank pain.   Musculoskeletal:  Positive for gait problem and myalgias. Negative for neck pain and neck stiffness.   Skin:  Positive for wound. Negative for color change.   Neurological:  Positive for numbness. Negative for weakness, light-headedness and headaches.        + paresthesia    Psychiatric/Behavioral:  Negative for sleep disturbance.          Objective:        Physical Exam  Constitutional:       Appearance: He is well-developed.   Cardiovascular:      Comments: Dorsalis pedis and posterior tibial pulses are palpable Skin is atrophic, slightly hyperpigmented, and mildly edematous      Musculoskeletal:         General: No tenderness. Normal range of motion.      Comments: Muscle strength is 5/5 in " all groups bilaterally.    S/p partial 5th ray amp b/l    S/p hallux amp right    Fat pad atrophy to heels and met heads bilateral       Skin:     General: Skin is warm and dry.      Coloration: Skin is not jaundiced, mottled or sallow.      Findings: Wound (see below) present. No abscess or ecchymosis.      Comments:        Neurological:      Mental Status: He is alert and oriented to person, place, and time.      Comments: [place]en[place] 5.07 monofilamant testing is diminished Kenji feet. Sharp/dull sensation diminished Bilaterally. Light touch absent Bilaterally.       Psychiatric:         Behavior: Behavior normal.         Vitals:    02/23/24 0847   BP: (!) 144/86   Pulse: 97   Temp: 98.3 °F (36.8 °C)       Assessment:           ICD-10-CM ICD-9-CM   1. Type II diabetes mellitus with neurological manifestations  E11.49 250.60   2. Diabetic ulcer of left midfoot associated with type 2 diabetes mellitus, with fat layer exposed  E11.621 250.80    L97.422 707.14   3. Blister (nonthermal), left foot, sequela  S90.822S 906.2            (Retired) Wound 04/08/22 1137 Diabetic Ulcer Left plantar;lateral Foot (Active)   04/08/22 1137    Pre-existing: Yes   Primary Wound Type: Diabetic ulc   Side: Left   Orientation: plantar;lateral   Location: Foot   Wound Number:    Ankle-Brachial Index:    Pulses:    Removal Indication and Assessment:    Wound Outcome:    (Retired) Wound Type:    (Retired) Wound Length (cm):    (Retired) Wound Width (cm):    (Retired) Depth (cm):    Wound Description (Comments):    Removal Indications:    Wound Image     02/23/24 1213   Wound WDL ex 02/23/24 1213   Dressing Appearance Intact;Moist drainage 02/23/24 1213   Drainage Amount Large 02/23/24 1213   Drainage Characteristics/Odor Serosanguineous;No odor 02/23/24 1213   Appearance Moist;Red 02/23/24 1213   Tissue loss description Full thickness 02/23/24 1213   Black (%), Wound Tissue Color 20 % 02/23/24 1213   Red (%), Wound Tissue Color 80 %  02/23/24 1213   Periwound Area Moist;Pale white;Blistered 02/23/24 1213   Wound Edges Open;Defined 02/23/24 1213   Wound Length (cm) 7 cm 02/23/24 1213   Wound Width (cm) 2.2 cm 02/23/24 1213   Wound Depth (cm) 0.2 cm 02/23/24 1213   Wound Volume (cm^3) 3.08 cm^3 02/23/24 1213   Wound Surface Area (cm^2) 15.4 cm^2 02/23/24 1213   Care Cleansed with:;Antimicrobial agent;Sterile normal saline 02/23/24 1213   Dressing Applied;Gauze;Foam;Absorptive Pad;Cast padding 02/23/24 1213   Periwound Care Moisturizer applied 02/23/24 1213   Compression Two layer compression 02/23/24 1213   Off Loading Off loading shoe 02/23/24 1213   Dressing Change Due 03/01/24 02/23/24 1213             Plan:              Education about the prevention of limb loss.    Discussed wound healing cycle, skin integrity, ways to care for skin.Counseled patient on the effects of biomechanical pressure,  PVD, and blood glucose on healing. He verbalizes understanding that it can increase the chances of delayed healing and this prolonged exposure leads to infection or progression of infection which subsequently can result in loss of limb.    The site is cleansed of foreign material as much as possible,     Patient measurement to the Left Plantar Foot wound has increased in length and width, since last nurse visit on 02/20/2024.     Patient instructed to return to [facility] boot + knee scooter daily.    Long-term goals include keeping the wound healed by good offloading and medical management under the direction of internist.    Wound care done as per MD order.     Nurse Visit 02/28/2024. Return to clinic in 1 week.    Debridement    Date/Time: 2/23/2024 8:00 AM    Performed by: Marivel Peterson DPM  Authorized by: Marivel Peterson DPM      Wound Details:    Location:  Left foot    Location:  Left Midfoot    Type of Debridement:  Excisional       Length (cm):  7       Area (sq cm):  15.4       Width (cm):  2.2       Percent Debrided (%):  100       Depth (cm):   "0.2       Total Area Debrided (sq cm):  15.4    Depth of debridement:  Subcutaneous tissue    Tissue debrided:  Dermis, Epidermis and Subcutaneous    Devitalized tissue debrided:  Callus and Fibrin    Instruments:  Curette  Bleeding:  Minimal  Hemostasis Achieved: Yes  Method Used:  Pressure  Patient tolerance:  Patient tolerated the procedure well with no immediate complications      Orders Placed This Encounter   Procedures    Debridement     This order was created via procedure documentation     Standing Status:   Standing     Number of Occurrences:   1    Change dressing     Wound Dressing Orders    Dressing change frequency twice a week (prn nurse visit)  Remove old dressing  Cleanse or irrigate with: Normal Saline  Protect periwound with:  periwound: Gentian Violet  Primary dressing - adjust to fit: WOUND BASE: Adaptic soak in Iodine applied to wound bed  Secondary dressing:  Used: 4" x 4", Non-Bordered Foam x2 (Mepliex, Abram n/a) Mextra: size used: 5" x 9" (laid vertically),  to off-load  Compression/Off-load: Cast padding x 3 rolls, and UrgoK2 (1xKTECH).  Defender shoe on the affected foot    Change at Nurse Visit          Follow up in about 1 week (around 3/1/2024) for wound care.       "

## 2024-02-28 ENCOUNTER — TELEPHONE (OUTPATIENT)
Dept: WOUND CARE | Facility: HOSPITAL | Age: 54
End: 2024-02-28
Payer: MEDICARE

## 2024-02-28 ENCOUNTER — HOSPITAL ENCOUNTER (OUTPATIENT)
Dept: WOUND CARE | Facility: HOSPITAL | Age: 54
Discharge: HOME | End: 2024-02-28
Payer: MEDICARE

## 2024-02-28 VITALS — HEART RATE: 92 BPM | SYSTOLIC BLOOD PRESSURE: 149 MMHG | DIASTOLIC BLOOD PRESSURE: 77 MMHG | TEMPERATURE: 98 F

## 2024-02-28 DIAGNOSIS — S90.822S BLISTER (NONTHERMAL), LEFT FOOT, SEQUELA: ICD-10-CM

## 2024-02-28 DIAGNOSIS — E11.621 DIABETIC ULCER OF LEFT MIDFOOT ASSOCIATED WITH TYPE 2 DIABETES MELLITUS, WITH FAT LAYER EXPOSED: ICD-10-CM

## 2024-02-28 DIAGNOSIS — L97.422 DIABETIC ULCER OF LEFT MIDFOOT ASSOCIATED WITH TYPE 2 DIABETES MELLITUS, WITH FAT LAYER EXPOSED: ICD-10-CM

## 2024-02-28 DIAGNOSIS — E11.49 TYPE II DIABETES MELLITUS WITH NEUROLOGICAL MANIFESTATIONS: Primary | ICD-10-CM

## 2024-02-28 PROCEDURE — 29581 APPL MULTLAYER CMPRN SYS LEG: CPT

## 2024-02-28 NOTE — PROGRESS NOTES
[facility] [address]  [address]  Nurse Visit    Subjective:       Patient seen in clinic today. Patient ambulated to exam room unaided with diabetic shoe w/lift on Right Foot and prescribed CAM Boot on LLE. Denies fever, chills, nausea, vomiting or diarrhea. No c/o pain or discomfort to wound bed at present. Dressing is intact to LLE, quarter sized amount of betadine strike through noted to Lateral side of foot. He reports using knee scooter while on trip out of town over the weekend, but sat for several hours during train ride with LLE down. Prior to application, patient's ankle circumference measures 26 cm and has current ALISSA of 1.13,  which correlates with UrgoK2, Large, 40 mmHg.  Dressing changed as ordered.      Assessment:          (Retired) Wound 04/08/22 1137 Diabetic Ulcer Left plantar;lateral Foot (Active)   04/08/22 1137    Pre-existing: Yes   Primary Wound Type: Diabetic ulc   Side: Left   Orientation: plantar;lateral   Location: Foot   Wound Number:    Ankle-Brachial Index:    Pulses:    Removal Indication and Assessment:    Wound Outcome:    (Retired) Wound Type:    (Retired) Wound Length (cm):    (Retired) Wound Width (cm):    (Retired) Depth (cm):    Wound Description (Comments):    Removal Indications:    Wound Image   02/28/24 0844   Wound WDL ex 02/28/24 0844   Dressing Appearance Intact;Moist drainage;Area marked 02/28/24 0844   Drainage Amount Large 02/28/24 0844   Drainage Characteristics/Odor Serous;No odor;Other (see comments) 02/28/24 0844   Appearance Pink;Tan;Slough;Moist 02/28/24 0844   Tissue loss description Full thickness 02/28/24 0844   Black (%), Wound Tissue Color 0 % 02/28/24 0844   Red (%), Wound Tissue Color 70 % 02/28/24 0844   Yellow (%), Wound Tissue Color 30 % 02/28/24 0844   Periwound Area Pale white;Moist;Macerated;Blistered 02/28/24 0844   Wound Edges Defined;Open 02/28/24 0844   Care Cleansed with:;Antimicrobial agent;Sterile  "normal saline;Applied:;Povidone iodine 02/28/24 0844   Dressing Applied;Non-adherent;Calcium alginate;Absorptive Pad;Foam;Cast padding;Compression wrap 02/28/24 0844   Periwound Care Moisture barrier applied;Absorptive dressing applied 02/28/24 0844   Compression Two layer compression 02/28/24 0844   Off Loading Football dressing;Off loading shoe 02/28/24 0844   Dressing Change Due 03/01/24 02/28/24 0844           Plan:     Wound Dressing Orders    Dressing change frequency twice a week (prn nurse visit)  Remove old dressing  Cleanse or irrigate with: Normal Saline  Protect periwound with:  periwound: Gentian Violet   Primary dressing - adjust to fit: WOUND BASE: Adaptic soaked in Betadine Iodine then applied to wound bed  Secondary dressing: Silvercel Ag (Alginate, cut to size of wound bed, 2 layers) then Mextra (5" x 9"), 1 layer of cast padding then  Mepilex (non-border foam) and ABD Pad (laid vertically) to offload  Compression/Off-load: Football (cast padding x 2 rolls, 1/2 roll fan folded to plantar for cushion). UrgoK2, size Large, 40 mmHg (ankle size 26 cm) and CAM Boot on the affected foot      Follow up in about 2 days (around 3/1/2024) for Wound Care.        "

## 2024-02-28 NOTE — TELEPHONE ENCOUNTER
"Request for additional information to fulfill Orthotic device order received. (See request in )    Faxed clinical notes from 09/15/2023-10/06/2023.    Your fax has been successfully sent to [phone] at [phone].  ------------------------------------------------------------  From: [id number]  ------------------------------------------------------------  2/28/2024 11:55:41 AM Transmission Record          Sent to [phone] with remote ID "[id number]"          Result: (0/339;0/0) Success          Page record: 1 - 25   "

## 2024-03-01 ENCOUNTER — HOSPITAL ENCOUNTER (OUTPATIENT)
Dept: WOUND CARE | Facility: HOSPITAL | Age: 54
Discharge: HOME | End: 2024-03-01
Attending: PODIATRIST
Payer: MEDICARE

## 2024-03-01 VITALS
BODY MASS INDEX: 29.16 KG/M2 | SYSTOLIC BLOOD PRESSURE: 136 MMHG | WEIGHT: 220 LBS | DIASTOLIC BLOOD PRESSURE: 62 MMHG | HEART RATE: 91 BPM | TEMPERATURE: 98 F | HEIGHT: 73 IN

## 2024-03-01 DIAGNOSIS — E11.621 DIABETIC ULCER OF LEFT MIDFOOT ASSOCIATED WITH TYPE 2 DIABETES MELLITUS, WITH FAT LAYER EXPOSED: ICD-10-CM

## 2024-03-01 DIAGNOSIS — S90.822S BLISTER (NONTHERMAL), LEFT FOOT, SEQUELA: ICD-10-CM

## 2024-03-01 DIAGNOSIS — L97.422 DIABETIC ULCER OF LEFT MIDFOOT ASSOCIATED WITH TYPE 2 DIABETES MELLITUS, WITH FAT LAYER EXPOSED: ICD-10-CM

## 2024-03-01 DIAGNOSIS — E11.49 TYPE II DIABETES MELLITUS WITH NEUROLOGICAL MANIFESTATIONS: Primary | ICD-10-CM

## 2024-03-01 PROCEDURE — 11042 DBRDMT SUBQ TIS 1ST 20SQCM/<: CPT | Performed by: PODIATRIST

## 2024-03-01 PROCEDURE — 11042 DBRDMT SUBQ TIS 1ST 20SQCM/<: CPT | Mod: ,,, | Performed by: PODIATRIST

## 2024-03-01 PROCEDURE — 11045 DBRDMT SUBQ TISS EACH ADDL: CPT | Performed by: PODIATRIST

## 2024-03-01 PROCEDURE — 99499 UNLISTED E&M SERVICE: CPT | Mod: ,,, | Performed by: PODIATRIST

## 2024-03-01 NOTE — PROGRESS NOTES
[facility]                Progress Note    Subjective:       Patient ID: Indio Ryder Jr. is a 53 y.o. male.    Chief Complaint: Wound Check, Dressing Change, and Diabetic Foot Ulcer    Follow up wound care visit. Patient ambulated to exam room using knee scooter to off-load left foot with diabetic shoe w/lift on Right Foot and CAM Boot on Left Foot. No c/o pain to wound bed at present. Denies fever, chills, nausea, vomiting or diarrhea. Dressing is intact to LLE, without strike through drainage.     Wound Check  There is no pain present.     Review of Systems   Constitutional:  Negative for appetite change, chills, fatigue and fever.   HENT:  Negative for hearing loss.    Eyes:  Negative for photophobia and visual disturbance.   Respiratory:  Negative for cough, chest tightness, shortness of breath and wheezing.    Cardiovascular:  Positive for leg swelling. Negative for chest pain and palpitations.   Gastrointestinal:  Negative for constipation, diarrhea, nausea and vomiting.   Endocrine: Negative for cold intolerance and heat intolerance.   Genitourinary:  Negative for flank pain.   Musculoskeletal:  Positive for gait problem and myalgias. Negative for neck pain and neck stiffness.   Skin:  Positive for wound. Negative for color change.   Neurological:  Positive for numbness. Negative for weakness, light-headedness and headaches.        + paresthesia    Psychiatric/Behavioral:  Negative for sleep disturbance.          Objective:        Physical Exam  Constitutional:       Appearance: He is well-developed.   Cardiovascular:      Comments: Dorsalis pedis and posterior tibial pulses are palpable Skin is atrophic, slightly hyperpigmented, and mildly edematous      Musculoskeletal:         General: No tenderness. Normal range of motion.      Comments: Muscle strength is 5/5 in all groups bilaterally.    S/p partial 5th ray amp b/l    S/p hallux amp right    Fat pad  atrophy to heels and met heads bilateral       Skin:     General: Skin is warm and dry.      Coloration: Skin is not jaundiced, mottled or sallow.      Findings: Wound (see below) present. No abscess or ecchymosis.      Comments:        Neurological:      Mental Status: He is alert and oriented to person, place, and time.      Comments: [place]en[place] 5.07 monofilamant testing is diminished Kenji feet. Sharp/dull sensation diminished Bilaterally. Light touch absent Bilaterally.       Psychiatric:         Behavior: Behavior normal.         Vitals:    03/01/24 0756   BP: 136/62   Pulse: 91   Temp: 98 °F (36.7 °C)       Assessment:           ICD-10-CM ICD-9-CM   1. Type II diabetes mellitus with neurological manifestations  E11.49 250.60   2. Diabetic ulcer of left midfoot associated with type 2 diabetes mellitus, with fat layer exposed  E11.621 250.80    L97.422 707.14   3. Blister (nonthermal), left foot, sequela  S90.822S 906.2            (Retired) Wound 04/08/22 1137 Diabetic Ulcer Left plantar;lateral Foot (Active)   04/08/22 1137    Pre-existing: Yes   Primary Wound Type: Diabetic ulc   Side: Left   Orientation: plantar;lateral   Location: Foot   Wound Number:    Ankle-Brachial Index:    Pulses:    Removal Indication and Assessment:    Wound Outcome:    (Retired) Wound Type:    (Retired) Wound Length (cm):    (Retired) Wound Width (cm):    (Retired) Depth (cm):    Wound Description (Comments):    Removal Indications:    Wound Image    03/01/24 1258   Wound WDL ex 03/01/24 1258   Dressing Appearance Intact;Moist drainage 03/01/24 1258   Drainage Amount Moderate 03/01/24 1258   Drainage Characteristics/Odor Serosanguineous;Tan;No odor 03/01/24 1258   Appearance Pink;Tan;Slough;Moist 03/01/24 1258   Tissue loss description Full thickness 03/01/24 1258   Black (%), Wound Tissue Color 0 % 03/01/24 1258   Red (%), Wound Tissue Color 90 % 03/01/24 1258   Yellow (%), Wound Tissue Color 10 % 03/01/24 1258   Periwound Area  Pale white 03/01/24 1258   Wound Edges Defined;Open 03/01/24 1258   Wound Length (cm) 6.5 cm 03/01/24 1258   Wound Width (cm) 3 cm 03/01/24 1258   Wound Depth (cm) 0.3 cm 03/01/24 1258   Wound Volume (cm^3) 5.85 cm^3 03/01/24 1258   Wound Surface Area (cm^2) 19.5 cm^2 03/01/24 1258   Tunneling (depth (cm)/location) 0 03/01/24 1258   Undermining (depth (cm)/location) 0 03/01/24 1258   Care Cleansed with:;Antimicrobial agent;Sterile normal saline;Wound cleanser;Debrided 03/01/24 1258   Dressing Applied;Non-adherent;Calcium alginate;Absorptive Pad;Cast padding;Compression wrap 03/01/24 1258   Periwound Care Moisture barrier applied;Absorptive dressing applied 03/01/24 1258   Compression Two layer compression 03/01/24 1258   Off Loading Football dressing;Off loading shoe 03/01/24 1258   Dressing Change Due 03/05/24 03/01/24 1258             Plan:              Education about the prevention of limb loss.    Discussed wound healing cycle, skin integrity, ways to care for skin.Counseled patient on the effects of biomechanical pressure,  PVD, and blood glucose on healing. He verbalizes understanding that it can increase the chances of delayed healing and this prolonged exposure leads to infection or progression of infection which subsequently can result in loss of limb.    The site is cleansed of foreign material as much as possible,     Prior to application, patient's ankle circumference measures 26 cm and has current ALISSA of 1.13,  which correlates with UrgoK2, Large, 40 mmHg.     Long-term goals include keeping the wound healed by good offloading and medical management under the direction of internist.    Nurse Visit Tuesday.  Return to clinic in 1 week.    Wound Debridement    Date/Time: 3/1/2024 7:40 AM    Performed by: aMrivel Peterson DPM  Authorized by: Marivel Peterson DPM      Wound Details:    Location:  Left foot    Location:  Left Midfoot    Type of Debridement:  Excisional       Length (cm):  6.5       Area (sq  "cm):  19.5       Width (cm):  3       Percent Debrided (%):  100       Depth (cm):  0.3       Total Area Debrided (sq cm):  19.5    Depth of debridement:  Subcutaneous tissue    Tissue debrided:  Dermis, Epidermis and Subcutaneous    Devitalized tissue debrided:  Fibrin and Callus    Instruments:  Curette  Bleeding:  Minimal  Hemostasis Achieved: Yes  Method Used:  Pressure  Patient tolerance:  Patient tolerated the procedure well with no immediate complications      Orders Placed This Encounter   Procedures    Debridement     This order was created via procedure documentation     Standing Status:   Standing     Number of Occurrences:   1    Change dressing     Wound Dressing Orders     Dressing change frequency twice a week (prn nurse visit)  Remove old dressing  Cleanse or irrigate with: Normal Saline  Protect periwound with:  periwound: Gentian Violet   Primary dressing - adjust to fit: WOUND BASE: Adaptic soaked in Betadine Iodine then applied to wound bed  Secondary dressing: Silvercel Ag (Alginate, cut to size of wound bed, 2 layers) then Mextra (5" x 9"), 1 layer of cast padding then Mepilex (non-border foam) and ABD Pad (laid vertically) to offload  Compression/Off-load: Football (cast padding x 2 rolls, 1/2 roll fan folded to plantar for cushion). UrgoK2, size Large, 40 mmHg (ankle size 26 cm) and CAM Boot on the affected foot    Change at Nurse Visit          Follow up in about 4 days (around 3/5/2024) for Wound Care, Nurse Visit.       "

## 2024-03-05 ENCOUNTER — HOSPITAL ENCOUNTER (OUTPATIENT)
Dept: WOUND CARE | Facility: HOSPITAL | Age: 54
Discharge: HOME | End: 2024-03-05
Payer: MEDICARE

## 2024-03-05 VITALS — HEART RATE: 87 BPM | SYSTOLIC BLOOD PRESSURE: 135 MMHG | DIASTOLIC BLOOD PRESSURE: 71 MMHG | TEMPERATURE: 98 F

## 2024-03-05 DIAGNOSIS — L97.422 DIABETIC ULCER OF LEFT MIDFOOT ASSOCIATED WITH TYPE 2 DIABETES MELLITUS, WITH FAT LAYER EXPOSED: ICD-10-CM

## 2024-03-05 DIAGNOSIS — E11.621 DIABETIC ULCER OF LEFT MIDFOOT ASSOCIATED WITH TYPE 2 DIABETES MELLITUS, WITH FAT LAYER EXPOSED: ICD-10-CM

## 2024-03-05 DIAGNOSIS — S90.822S BLISTER (NONTHERMAL), LEFT FOOT, SEQUELA: ICD-10-CM

## 2024-03-05 DIAGNOSIS — E11.49 TYPE II DIABETES MELLITUS WITH NEUROLOGICAL MANIFESTATIONS: Primary | ICD-10-CM

## 2024-03-05 PROCEDURE — 29581 APPL MULTLAYER CMPRN SYS LEG: CPT

## 2024-03-05 NOTE — PROGRESS NOTES
[facility]  [address]  [address]  Nurse Visit    Subjective:       Patient seen in clinic today. Patient ambulated to exam room using knee scooter to off-load left foot with CAM boot on LLE and diabetic shoe w/lift on right foot. He denies fever, chills, nausea, vomiting or diarrhea. No c/o pain or discomfort to wound bed at present. Dressing is intact to LLE, a moderate amount of strike through drainage noted to outer layer of cast padding under UrgoK2 wrap. He reports not having his knee scooter for 2 days, but denies walking more without it. Prior to application, patient's ankle circumference measures 25.5 cm and has current ALISSA of 1.13,  which correlates with UrgoK2, Large, 40 mmHg.  Dressing changed as ordered. Return to clinic to see DPM on Friday.      Assessment:          (Retired) Wound 04/08/22 1137 Diabetic Ulcer Left plantar;lateral Foot (Active)   04/08/22 1137    Pre-existing: Yes   Primary Wound Type: Diabetic ulc   Side: Left   Orientation: plantar;lateral   Location: Foot   Wound Number:    Ankle-Brachial Index:    Pulses:    Removal Indication and Assessment:    Wound Outcome:    (Retired) Wound Type:    (Retired) Wound Length (cm):    (Retired) Wound Width (cm):    (Retired) Depth (cm):    Wound Description (Comments):    Removal Indications:    Wound Image   03/05/24 1457   Wound WDL ex 03/05/24 1457   Dressing Appearance Intact;Moist drainage;Area marked 03/05/24 1457   Drainage Amount Large 03/05/24 1457   Drainage Characteristics/Odor Serosanguineous;No odor 03/05/24 1457   Appearance Pink;Red;Tan;Moist;Slough 03/05/24 1457   Tissue loss description Full thickness 03/05/24 1457   Black (%), Wound Tissue Color 0 % 03/05/24 1457   Red (%), Wound Tissue Color 70 % 03/05/24 1457   Yellow (%), Wound Tissue Color 30 % 03/05/24 1457   Periwound Area Pale white;Moist;Macerated;Blistered 03/05/24 1457   Wound Edges Defined;Open 03/05/24 1457   Care Cleansed  "with:;Antimicrobial agent;Sterile normal saline 03/05/24 1457   Dressing Applied;Non-adherent;Calcium alginate;Absorptive Pad;Cast padding;Compression wrap 03/05/24 1457   Periwound Care Moisture barrier applied;Absorptive dressing applied 03/05/24 1457   Compression Two layer compression 03/05/24 1457   Off Loading Football dressing;Off loading shoe 03/05/24 1457   Dressing Change Due 03/08/24 03/05/24 1457           Plan:     Wound Dressing Orders     Dressing change frequency twice a week (prn nurse visit)  Remove old dressing  Cleanse or irrigate with: Normal Saline  Protect periwound with:  periwound: Gentian Violet  Primary dressing - adjust to fit: WOUND BASE: Adaptic soaked in Betadine Iodine then applied to wound bed  Secondary dressing: Silvercel Ag (Alginate, cut to size of wound bed, 2 layers) then Mextra (5" x 9"), 1 layer of cast padding then Mepilex (non-border foam) and ABD Pad (laid vertically) to offload  Compression/Off-load: Football (cast padding x 2 rolls, 1/2 roll fan folded to plantar for cushion). UrgoK2, size Large, 40 mmHg (ankle size 26 cm) and CAM Boot on the affected foot           Follow up in about 3 days (around 3/8/2024) for Wound Care.          "

## 2024-03-08 ENCOUNTER — HOSPITAL ENCOUNTER (OUTPATIENT)
Dept: WOUND CARE | Facility: HOSPITAL | Age: 54
Discharge: HOME | End: 2024-03-08
Attending: PODIATRIST
Payer: MEDICARE

## 2024-03-08 VITALS — SYSTOLIC BLOOD PRESSURE: 142 MMHG | DIASTOLIC BLOOD PRESSURE: 93 MMHG | TEMPERATURE: 98 F | HEART RATE: 85 BPM

## 2024-03-08 DIAGNOSIS — L97.422 DIABETIC ULCER OF LEFT MIDFOOT ASSOCIATED WITH TYPE 2 DIABETES MELLITUS, WITH FAT LAYER EXPOSED: Primary | ICD-10-CM

## 2024-03-08 DIAGNOSIS — E11.49 TYPE II DIABETES MELLITUS WITH NEUROLOGICAL MANIFESTATIONS: ICD-10-CM

## 2024-03-08 DIAGNOSIS — E11.621 DIABETIC ULCER OF LEFT MIDFOOT ASSOCIATED WITH TYPE 2 DIABETES MELLITUS, WITH FAT LAYER EXPOSED: Primary | ICD-10-CM

## 2024-03-08 DIAGNOSIS — S90.822S BLISTER (NONTHERMAL), LEFT FOOT, SEQUELA: ICD-10-CM

## 2024-03-08 PROCEDURE — 11042 DBRDMT SUBQ TIS 1ST 20SQCM/<: CPT | Mod: ,,, | Performed by: PODIATRIST

## 2024-03-08 PROCEDURE — 11042 DBRDMT SUBQ TIS 1ST 20SQCM/<: CPT | Performed by: PODIATRIST

## 2024-03-08 PROCEDURE — 99499 UNLISTED E&M SERVICE: CPT | Mod: ,,, | Performed by: PODIATRIST

## 2024-03-08 PROCEDURE — 11045 DBRDMT SUBQ TISS EACH ADDL: CPT | Performed by: PODIATRIST

## 2024-03-08 PROCEDURE — 11045 DBRDMT SUBQ TISS EACH ADDL: CPT | Mod: ,,, | Performed by: PODIATRIST

## 2024-03-08 NOTE — PROGRESS NOTES
[facility]                Progress Note    Subjective:       Patient ID: Indio Ryder Jr. is a 53 y.o. male.    Chief Complaint: Wound Check, Dressing Change, and Diabetic Foot Ulcer    Follow up wound care visit. Patient ambulated to exam room using knee scooter for the left foot with CAM boot on and diabetic shoe w/lift on Right Foot. No c/o pain to wound bed at present. Denies fever, chills, nausea, vomiting or diarrhea. Dressing is intact to LLE, with no strike through drainage.     Wound Check  There is no pain present.     Review of Systems   Constitutional:  Negative for appetite change, chills, fatigue and fever.   HENT:  Negative for hearing loss.    Eyes:  Negative for photophobia and visual disturbance.   Respiratory:  Negative for cough, chest tightness, shortness of breath and wheezing.    Cardiovascular:  Positive for leg swelling. Negative for chest pain and palpitations.   Gastrointestinal:  Negative for constipation, diarrhea, nausea and vomiting.   Endocrine: Negative for cold intolerance and heat intolerance.   Genitourinary:  Negative for flank pain.   Musculoskeletal:  Positive for gait problem and myalgias. Negative for neck pain and neck stiffness.   Skin:  Positive for wound. Negative for color change.   Neurological:  Positive for numbness. Negative for weakness, light-headedness and headaches.        + paresthesia    Psychiatric/Behavioral:  Negative for sleep disturbance.          Objective:        Physical Exam  Constitutional:       Appearance: He is well-developed.   Cardiovascular:      Comments: Dorsalis pedis and posterior tibial pulses are palpable Skin is atrophic, slightly hyperpigmented, and mildly edematous      Musculoskeletal:         General: No tenderness. Normal range of motion.      Comments: Muscle strength is 5/5 in all groups bilaterally.    S/p partial 5th ray amp b/l    S/p hallux amp right    Fat pad atrophy to  heels and met heads bilateral       Skin:     General: Skin is warm and dry.      Coloration: Skin is not jaundiced, mottled or sallow.      Findings: Wound (see below) present. No abscess or ecchymosis.      Comments:        Neurological:      Mental Status: He is alert and oriented to person, place, and time.      Comments: [place]-Nenita 5.07 monofilamant testing is diminished Kenji feet. Sharp/dull sensation diminished Bilaterally. Light touch absent Bilaterally.       Psychiatric:         Behavior: Behavior normal.         Vitals:    03/08/24 0805   BP: (!) 142/93   Pulse: 85   Temp: 98.2 °F (36.8 °C)       Assessment:           ICD-10-CM ICD-9-CM   1. Diabetic ulcer of left midfoot associated with type 2 diabetes mellitus, with fat layer exposed  E11.621 250.80    L97.422 707.14   2. Type II diabetes mellitus with neurological manifestations  E11.49 250.60   3. Blister (nonthermal), left foot, sequela  S90.822S 906.2            (Retired) Wound 04/08/22 1137 Diabetic Ulcer Left plantar;lateral Foot (Active)   04/08/22 1137    Pre-existing: Yes   Primary Wound Type: Diabetic ulc   Side: Left   Orientation: plantar;lateral   Location: Foot   Wound Number:    Ankle-Brachial Index:    Pulses:    Removal Indication and Assessment:    Wound Outcome:    (Retired) Wound Type:    (Retired) Wound Length (cm):    (Retired) Wound Width (cm):    (Retired) Depth (cm):    Wound Description (Comments):    Removal Indications:    Wound Image    03/08/24 1647   Wound WDL ex 03/08/24 1647   Dressing Appearance Intact;Moist drainage 03/08/24 1647   Drainage Amount Moderate 03/08/24 1647   Drainage Characteristics/Odor Serosanguineous;No odor 03/08/24 1647   Appearance Red;Pink;Tan;Moist 03/08/24 1647   Tissue loss description Full thickness 03/08/24 1647   Black (%), Wound Tissue Color 0 % 03/08/24 1647   Red (%), Wound Tissue Color 90 % 03/08/24 1647   Yellow (%), Wound Tissue Color 10 % 03/08/24 1647   Periwound Area Pale  white;Moist;Macerated 03/08/24 1647   Wound Edges Defined;Open 03/08/24 1647   Wound Length (cm) 7 cm 03/08/24 1647   Wound Width (cm) 3.1 cm 03/08/24 1647   Wound Depth (cm) 0.2 cm 03/08/24 1647   Wound Volume (cm^3) 4.34 cm^3 03/08/24 1647   Wound Surface Area (cm^2) 21.7 cm^2 03/08/24 1647   Tunneling (depth (cm)/location) 0 03/08/24 1647   Undermining (depth (cm)/location) 0 03/08/24 1647   Care Cleansed with:;Antimicrobial agent;Sterile normal saline;Wound cleanser;Debrided 03/08/24 1647   Dressing Applied;Foam;Cast padding;Compression wrap 03/08/24 1647   Periwound Care Moisture barrier applied;Moisturizer applied 03/08/24 1647   Compression Two layer compression 03/08/24 1647   Off Loading Football dressing;Off loading shoe 03/08/24 1647   Dressing Change Due 03/12/24 03/08/24 1647             Plan:              Education about the prevention of limb loss.    Discussed wound healing cycle, skin integrity, ways to care for skin.Counseled patient on the effects of biomechanical pressure,  PVD, and blood glucose on healing. He verbalizes understanding that it can increase the chances of delayed healing and this prolonged exposure leads to infection or progression of infection which subsequently can result in loss of limb.    The site is cleansed of foreign material as much as possible,     Wound bed appears less wet as compared to his nurse visit on Tuesday.     Prior to application, patient's ankle circumference measures 26 cm and has current ALISSA of 1.13,  which correlates with UrgoK2, Large, 40 mmHg.     Long-term goals include keeping the wound healed by good offloading and medical management under the direction of internist.    Wound care done as per MD order.     Wound Debridement    Date/Time: 3/8/2024 7:33 AM    Performed by: Marivel Peterson DPM  Authorized by: Marivel Peterson DPM      Wound Details:    Location:  Left foot    Location:  Left Midfoot    Type of Debridement:  Excisional       Length (cm):   "7       Area (sq cm):  21.7       Width (cm):  3.1       Percent Debrided (%):  100       Depth (cm):  0.2       Total Area Debrided (sq cm):  21.7    Depth of debridement:  Subcutaneous tissue    Tissue debrided:  Dermis and Epidermis    Devitalized tissue debrided:  Callus and Fibrin    Instruments:  Curette  Bleeding:  Minimal  Hemostasis Achieved: Yes  Method Used:  Pressure  Patient tolerance:  Patient tolerated the procedure well with no immediate complications      Orders Placed This Encounter   Procedures    Debridement     This order was created via procedure documentation     Standing Status:   Standing     Number of Occurrences:   1    Change dressing     Wound Dressing Orders      Dressing change frequency twice a week (prn nurse visit)   Remove old dressing   Cleanse or irrigate with: Normal Saline     Moisturize dry skin on leg with Enlivaderm    Protect periwound with:  Gentian Violet hugging wound bed  Primary dressing: WOUND BASE: [place] then UrgoTul Ag (cut to size of wound bed)   Secondary dressing: Silvercel Ag (Alginate, cut to size of wound bed) then Mextra (5" x 9"), 1 layer of cast padding then Mepilex (non-border foam) and ABD Pad (laid vertically) to offload   Compression/Off-load: Football (cast padding x 2 rolls, 1/2 roll fan folded to plantar for cushion). UrgoK2, size Large, 40 mmHg (ankle size 26 cm) and CAM Boot on the affected foot     Change at Nurse Visit          Follow up in about 4 days (around 3/12/2024) for Nurse Visit.       "

## 2024-03-12 ENCOUNTER — HOSPITAL ENCOUNTER (OUTPATIENT)
Dept: WOUND CARE | Facility: HOSPITAL | Age: 54
Discharge: HOME | End: 2024-03-12
Payer: MEDICARE

## 2024-03-12 VITALS — TEMPERATURE: 98 F | HEART RATE: 91 BPM | DIASTOLIC BLOOD PRESSURE: 75 MMHG | SYSTOLIC BLOOD PRESSURE: 148 MMHG

## 2024-03-12 DIAGNOSIS — S90.822S BLISTER (NONTHERMAL), LEFT FOOT, SEQUELA: ICD-10-CM

## 2024-03-12 DIAGNOSIS — E11.621 DIABETIC ULCER OF LEFT MIDFOOT ASSOCIATED WITH TYPE 2 DIABETES MELLITUS, WITH FAT LAYER EXPOSED: ICD-10-CM

## 2024-03-12 DIAGNOSIS — L97.422 DIABETIC ULCER OF LEFT MIDFOOT ASSOCIATED WITH TYPE 2 DIABETES MELLITUS, WITH FAT LAYER EXPOSED: ICD-10-CM

## 2024-03-12 DIAGNOSIS — E11.49 TYPE II DIABETES MELLITUS WITH NEUROLOGICAL MANIFESTATIONS: Primary | ICD-10-CM

## 2024-03-12 PROCEDURE — 29581 APPL MULTLAYER CMPRN SYS LEG: CPT

## 2024-03-12 NOTE — PROGRESS NOTES
[facility]  [address]  [address]  Nurse Visit    Subjective:       Patient seen in clinic today. Patient ambulated to exam room using knee scooter to off-load left foot with CAM boot on LLE and diabetic shoe w/lift on right foot. He denies fever, chills, nausea, vomiting or diarrhea. No c/o pain or discomfort to wound bed at present. Dressing is intact to LLE, a moderate amount of strike through drainage noted to outer layer of cast padding under UrgoK2 wrap and a large amount of drainage to Mepilex foam. Prior to application, patient's ankle circumference measures 25.5 cm and has current ALISSA of 1.13,  which correlates with UrgoK2, Large, 40 mmHg.  Dressing changed as ordered, Kaltostat applied to perimeter of wound bed d/t amount of maceration noted. Return to clinic to see DPM on Thursday.      Assessment:          (Retired) Wound 04/08/22 1137 Diabetic Ulcer Left plantar;lateral Foot (Active)   04/08/22 1137    Pre-existing: Yes   Primary Wound Type: Diabetic ulc   Side: Left   Orientation: plantar;lateral   Location: Foot   Wound Number:    Ankle-Brachial Index:    Pulses:    Removal Indication and Assessment:    Wound Outcome:    (Retired) Wound Type:    (Retired) Wound Length (cm):    (Retired) Wound Width (cm):    (Retired) Depth (cm):    Wound Description (Comments):    Removal Indications:    Wound Image   03/12/24 0903   Wound WDL ex 03/12/24 0903   Dressing Appearance Intact;Moist drainage;Area marked 03/12/24 0903   Drainage Amount Copious 03/12/24 0903   Drainage Characteristics/Odor Serosanguineous 03/12/24 0903   Appearance Pink;Red;Tan;Moist 03/12/24 0903   Tissue loss description Full thickness 03/12/24 0903   Black (%), Wound Tissue Color 0 % 03/12/24 0903   Red (%), Wound Tissue Color 80 % 03/12/24 0903   Yellow (%), Wound Tissue Color 20 % 03/12/24 0903   Periwound Area Pale white;Moist;Macerated 03/12/24 0903   Wound Edges Defined;Open 03/12/24 0903  "  Care Cleansed with:;Antimicrobial agent;Sterile normal saline 03/12/24 0903   Dressing Applied;Non-adherent;Calcium alginate;Absorptive Pad;Foam;Cast padding;Compression wrap 03/12/24 0903   Periwound Care Moisture barrier applied;Absorptive dressing applied 03/12/24 0903   Compression Two layer compression 03/12/24 0903   Off Loading Football dressing;Off loading shoe 03/12/24 0903   Dressing Change Due 03/14/24 03/12/24 0903           Plan:     Wound Dressing Orders      Dressing change frequency twice a week (prn nurse visit)   Remove old dressing   Cleanse or irrigate with: Normal Saline     Moisturize dry skin on leg with Enlivaderm     Protect periwound with:  Gentian Violet hugging wound bed; Kaltostat to cover macerated skin with large hole to apply primary dressing below  Primary dressing: WOUND BASE: [place] then UrgoTul Ag (cut to size of wound bed)   Secondary dressing: Silvercel Ag (Alginate, cut to size of wound bed) then Mextra (5" x 9"), 1 layer of cast padding then Mepilex (non-border foam) and ABD Pad (laid vertically) to offload   Compression/Off-load: Football (cast padding x 2 rolls, 1/2 roll fan folded to plantar for cushion). UrgoK2, size Large, 40 mmHg (ankle size 25.5 cm) and CAM Boot on the affected foot           Follow up in about 2 days (around 3/14/2024) for Wound Care.          "

## 2024-03-14 ENCOUNTER — OFFICE VISIT (OUTPATIENT)
Dept: PODIATRY | Facility: CLINIC | Age: 54
End: 2024-03-14
Payer: MEDICARE

## 2024-03-14 VITALS
SYSTOLIC BLOOD PRESSURE: 161 MMHG | WEIGHT: 220 LBS | HEIGHT: 73 IN | HEART RATE: 95 BPM | DIASTOLIC BLOOD PRESSURE: 88 MMHG | BODY MASS INDEX: 29.16 KG/M2

## 2024-03-14 DIAGNOSIS — L97.422 DIABETIC ULCER OF LEFT MIDFOOT ASSOCIATED WITH TYPE 2 DIABETES MELLITUS, WITH FAT LAYER EXPOSED: Primary | ICD-10-CM

## 2024-03-14 DIAGNOSIS — E11.49 TYPE II DIABETES MELLITUS WITH NEUROLOGICAL MANIFESTATIONS: ICD-10-CM

## 2024-03-14 DIAGNOSIS — E11.621 DIABETIC ULCER OF LEFT MIDFOOT ASSOCIATED WITH TYPE 2 DIABETES MELLITUS, WITH FAT LAYER EXPOSED: Primary | ICD-10-CM

## 2024-03-14 PROCEDURE — 99499 UNLISTED E&M SERVICE: CPT | Mod: S$PBB,,, | Performed by: PODIATRIST

## 2024-03-14 PROCEDURE — 99213 OFFICE O/P EST LOW 20 MIN: CPT | Mod: PBBFAC,PO,25 | Performed by: PODIATRIST

## 2024-03-14 PROCEDURE — 11042 DBRDMT SUBQ TIS 1ST 20SQCM/<: CPT | Mod: PBBFAC,PO | Performed by: PODIATRIST

## 2024-03-14 PROCEDURE — 99999 PR PBB SHADOW E&M-EST. PATIENT-LVL III: CPT | Mod: PBBFAC,,, | Performed by: PODIATRIST

## 2024-03-19 ENCOUNTER — HOSPITAL ENCOUNTER (OUTPATIENT)
Dept: WOUND CARE | Facility: HOSPITAL | Age: 54
Discharge: HOME | End: 2024-03-19
Payer: MEDICARE

## 2024-03-19 VITALS — SYSTOLIC BLOOD PRESSURE: 148 MMHG | TEMPERATURE: 98 F | HEART RATE: 80 BPM | DIASTOLIC BLOOD PRESSURE: 82 MMHG

## 2024-03-19 DIAGNOSIS — S90.822S BLISTER (NONTHERMAL), LEFT FOOT, SEQUELA: ICD-10-CM

## 2024-03-19 DIAGNOSIS — E11.621 DIABETIC ULCER OF LEFT MIDFOOT ASSOCIATED WITH TYPE 2 DIABETES MELLITUS, WITH FAT LAYER EXPOSED: ICD-10-CM

## 2024-03-19 DIAGNOSIS — L97.422 DIABETIC ULCER OF LEFT MIDFOOT ASSOCIATED WITH TYPE 2 DIABETES MELLITUS, WITH FAT LAYER EXPOSED: ICD-10-CM

## 2024-03-19 DIAGNOSIS — E11.49 TYPE II DIABETES MELLITUS WITH NEUROLOGICAL MANIFESTATIONS: Primary | ICD-10-CM

## 2024-03-19 PROCEDURE — 99213 OFFICE O/P EST LOW 20 MIN: CPT

## 2024-03-19 NOTE — PROGRESS NOTES
[facility]  [address]  [address]  Nurse Visit    Subjective:       Patient seen in clinic today. Patient ambulated to exam room using knee scooter to off-load left foot with CAM boot on LLE and diabetic shoe w/lift on right foot. He denies fever, chills, nausea, vomiting or diarrhea. No c/o pain or discomfort to wound bed at present. Dressing is intact to LLE, a large amount of strike through drainage noted to outer layer of Emery Wrap. Prior to application, patient's ankle circumference measures 26 cm and has current ALISSA of 1.13,  which correlates with UrgoK2, Large, 40 mmHg.  Dressing changed as ordered.      Assessment:          (Retired) Wound 04/08/22 1137 Diabetic Ulcer Left plantar;lateral Foot (Active)   04/08/22 1137    Pre-existing: Yes   Primary Wound Type: Diabetic ulc   Side: Left   Orientation: plantar;lateral   Location: Foot   Wound Number:    Ankle-Brachial Index:    Pulses:    Removal Indication and Assessment:    Wound Outcome:    (Retired) Wound Type:    (Retired) Wound Length (cm):    (Retired) Wound Width (cm):    (Retired) Depth (cm):    Wound Description (Comments):    Removal Indications:    Wound Image   03/19/24 1200   Wound WDL ex 03/19/24 1200   Dressing Appearance Intact;Moist drainage;Saturated;Area marked 03/19/24 1200   Drainage Amount Copious 03/19/24 1200   Drainage Characteristics/Odor Serosanguineous;[place];Malodorous 03/19/24 1200   Appearance Tan;Pink;Moist 03/19/24 1200   Tissue loss description Full thickness 03/19/24 1200   Black (%), Wound Tissue Color 0 % 03/19/24 1200   Red (%), Wound Tissue Color 80 % 03/19/24 1200   Yellow (%), Wound Tissue Color 20 % 03/19/24 1200   Periwound Area Pale white;Moist;Macerated 03/19/24 1200   Wound Edges Defined;Open 03/19/24 1200   Care Cleansed with:;Antimicrobial agent;Sterile normal saline;Wound cleanser 03/19/24 1200   Dressing Applied;Foam;Cast padding;Compression wrap 03/19/24 1200  "  Periwound Care Moisture barrier applied 03/19/24 1200   Compression Two layer compression 03/19/24 1200   Off Loading Football dressing;Off loading shoe 03/19/24 1200   Dressing Change Due 03/22/24 03/19/24 1200           Plan:   Wound Care Order    Dressing change frequency twice a week (prn nurse visit)   Remove old dressing   Cleanse or irrigate with: Vinegar soak x 10 minutes, rinse with normal saline, pat dry     Moisturize dry skin on leg with Enlivaderm     Protect periwound with:  Gentian Violet hugging wound bed   Primary dressing: WOUND BASE: Meropenem ABX topical powder then UrgoTul Ag (cut to size of wound bed)   Secondary dressing: Silvercel Ag (Alginate, cut to size of wound bed), Kaltostat laid to macerated area around wound bed with large hole cut, then Mextra (5" x 9" - laid vertically), 1 layer of cast padding then Mepilex (non-border foam) and ABD Pad (laid vertically) to offload   Compression/Off-load: Football (cast padding x 2 rolls, 1/2 roll fan folded to plantar for cushion). UrgoK2, size Large, 40 mmHg (ankle size 26 cm) and CAM Boot on the affected foot             Follow up in about 3 days (around 3/22/2024) for Wound Care.        "

## 2024-03-21 NOTE — PROGRESS NOTES
"[facility] Progress Note    Subjective:       Patient ID: Indio Ryder Jr. is a 53 y.o. male.    Chief Complaint: Diabetes Mellitus and Wound Check    Follow up wound care visit. Patient ambulated to exam room using knee scooter for the left foot with CAM boot on and diabetic shoe w/lift on Right Foot. No c/o pain to wound bed at present. Denies fever, chills, nausea, vomiting or diarrhea. Dressing is intact to LLE, with no strike through drainage.     Wound Check  There is no pain present.     Review of Systems   Constitutional:  Negative for appetite change, chills, fatigue and fever.   HENT:  Negative for hearing loss.    Eyes:  Negative for photophobia and visual disturbance.   Respiratory:  Negative for cough, chest tightness, shortness of breath and wheezing.    Cardiovascular:  Positive for leg swelling. Negative for chest pain and palpitations.   Gastrointestinal:  Negative for constipation, diarrhea, nausea and vomiting.   Endocrine: Negative for cold intolerance and heat intolerance.   Genitourinary:  Negative for flank pain.   Musculoskeletal:  Positive for gait problem and myalgias. Negative for neck pain and neck stiffness.   Skin:  Positive for wound. Negative for color change.   Neurological:  Positive for numbness. Negative for weakness, light-headedness and headaches.        + paresthesia    Psychiatric/Behavioral:  Negative for sleep disturbance.          Objective:     Vitals:    03/14/24 1500   BP: (!) 161/88   Pulse: 95   Weight: 99.8 kg (220 lb 0.3 oz)   Height: 6' 1" (1.854 m)   PainSc:   2       Physical Exam  Constitutional:       Appearance: He is well-developed.   Cardiovascular:      Comments: Dorsalis pedis and posterior tibial pulses are palpable Skin is atrophic, slightly hyperpigmented, and mildly edematous      Musculoskeletal:         General: No tenderness. Normal range of motion.      Comments: Muscle strength is 5/5 in all groups bilaterally.    S/p " partial 5th ray amp b/l    S/p hallux amp right    Fat pad atrophy to heels and met heads bilateral       Skin:     General: Skin is warm and dry.      Coloration: Skin is not jaundiced, mottled or sallow.      Findings: Wound (see below) present. No abscess or ecchymosis.      Comments:        Neurological:      Mental Status: He is alert and oriented to person, place, and time.      Comments: [place]en[place] 5.07 monofilamant testing is diminished Kenji feet. Sharp/dull sensation diminished Bilaterally. Light touch absent Bilaterally.       Psychiatric:         Behavior: Behavior normal.         03/14/2024    Ulcer location:  left plantar lateral midfoot  Measurements :  6.9x2.5x0.3 cm  Signs of infection: local edema  Drainage: Sero-Sanguinous  Purulence: No  Crepitus/fluctuance: No  Periwound: Macerated, Calloused  Base: Mixed Granular/Fibrotic  Depth: subcutaneous tissue  Probe to bone: No          Hemoglobin A1C   Date Value Ref Range Status   01/13/2023 10.1 (H) 4.0 - 5.6 % Final     Comment:     ADA Screening Guidelines:  5.7-6.4%  Consistent with prediabetes  >or=6.5%  Consistent with diabetes    High levels of fetal hemoglobin interfere with the HbA1C  assay. Heterozygous hemoglobin variants (HbS, HgC, etc)do  not significantly interfere with this assay.   However, presence of multiple variants may affect accuracy.     11/22/2022 9.8 (H) 4.0 - 5.6 % Final     Comment:     ADA Screening Guidelines:  5.7-6.4%  Consistent with prediabetes  >or=6.5%  Consistent with diabetes    High levels of fetal hemoglobin interfere with the HbA1C  assay. Heterozygous hemoglobin variants (HbS, HgC, etc)do  not significantly interfere with this assay.   However, presence of multiple variants may affect accuracy.     07/01/2022 13.3 (H) 4.0 - 5.6 % Final     Comment:     ADA Screening Guidelines:  5.7-6.4%  Consistent with prediabetes  >or=6.5%  Consistent with diabetes    High levels of fetal hemoglobin interfere with the  HbA1C  assay. Heterozygous hemoglobin variants (HbS, HgC, etc)do  not significantly interfere with this assay.   However, presence of multiple variants may affect accuracy.             Assessment:           ICD-10-CM ICD-9-CM   1. Diabetic ulcer of left midfoot associated with type 2 diabetes mellitus, with fat layer exposed  E11.621 250.80    L97.422 707.14   2. Type II diabetes mellitus with neurological manifestations  E11.49 250.60                      Plan:              Education about the prevention of limb loss.    Discussed wound healing cycle, skin integrity, ways to care for skin.Counseled patient on the effects of biomechanical pressure,  PVD, and blood glucose on healing. He verbalizes understanding that it can increase the chances of delayed healing and this prolonged exposure leads to infection or progression of infection which subsequently can result in loss of limb.    The site is cleansed of foreign material as much as possible    Cellerate, silvercel    No super absorbant available in podiatry clinic     I custom cut and personally applied an offloading pad to be applied to the foot due to deformity, body habitus, activity level    Long-term goals include keeping the wound healed by good offloading and medical management under the direction of internist.    Wound care done as per MD order.     Wound Debridement    Date/Time: 3/14/2024 2:15 PM    Performed by: Marivel Peterson DPM  Authorized by: Marivel Peterson DPM      Wound Details:    Location:  Left foot    Location:  Left Midfoot    Type of Debridement:  Excisional       Length (cm):  6.9       Area (sq cm):  17.25       Width (cm):  2.5       Percent Debrided (%):  100       Depth (cm):  0.3       Total Area Debrided (sq cm):  17.25    Depth of debridement:  Subcutaneous tissue    Tissue debrided:  Epidermis, Dermis and Subcutaneous    Devitalized tissue debrided:  Callus and Fibrin    Instruments:  Curette  Bleeding:  Minimal  Hemostasis  Achieved: Yes  Patient tolerance:  Patient tolerated the procedure well with no immediate complications      Orders Placed This Encounter   Procedures    Wound Debridement     This order was created via procedure documentation          No follow-ups on file.

## 2024-03-22 ENCOUNTER — HOSPITAL ENCOUNTER (OUTPATIENT)
Dept: WOUND CARE | Facility: HOSPITAL | Age: 54
Discharge: HOME | End: 2024-03-22
Attending: PODIATRIST
Payer: MEDICARE

## 2024-03-22 VITALS
HEIGHT: 73 IN | BODY MASS INDEX: 29.16 KG/M2 | WEIGHT: 220 LBS | DIASTOLIC BLOOD PRESSURE: 73 MMHG | HEART RATE: 91 BPM | SYSTOLIC BLOOD PRESSURE: 151 MMHG

## 2024-03-22 DIAGNOSIS — L97.422 DIABETIC ULCER OF LEFT MIDFOOT ASSOCIATED WITH TYPE 2 DIABETES MELLITUS, WITH FAT LAYER EXPOSED: ICD-10-CM

## 2024-03-22 DIAGNOSIS — E11.49 TYPE II DIABETES MELLITUS WITH NEUROLOGICAL MANIFESTATIONS: Primary | ICD-10-CM

## 2024-03-22 DIAGNOSIS — E11.621 DIABETIC ULCER OF LEFT MIDFOOT ASSOCIATED WITH TYPE 2 DIABETES MELLITUS, WITH FAT LAYER EXPOSED: ICD-10-CM

## 2024-03-22 PROCEDURE — 11042 DBRDMT SUBQ TIS 1ST 20SQCM/<: CPT | Mod: ,,, | Performed by: PODIATRIST

## 2024-03-22 PROCEDURE — 11042 DBRDMT SUBQ TIS 1ST 20SQCM/<: CPT | Performed by: PODIATRIST

## 2024-03-22 PROCEDURE — 99499 UNLISTED E&M SERVICE: CPT | Mod: ,,, | Performed by: PODIATRIST

## 2024-03-22 NOTE — PROGRESS NOTES
[facility]                Progress Note    Subjective:       Patient ID: Indio Ryder Jr. is a 53 y.o. male.    Chief Complaint: Non-healing Wound Follow Up    Follow up wound care visit. Patient ambulated to the exam room using knee scooter. Patient is wearing CAM boot on left foot and diabetic shoe with lift on the right foot. Denies pain, fever, chills, vomiting, nausea, or diarrhea at present. Dressing intact without strike through drainage.       Wound Check  There is no pain present.     Review of Systems   Constitutional:  Negative for appetite change, chills, fatigue and fever.   HENT:  Negative for hearing loss.    Eyes:  Negative for photophobia and visual disturbance.   Respiratory:  Negative for cough, chest tightness, shortness of breath and wheezing.    Cardiovascular:  Positive for leg swelling. Negative for chest pain and palpitations.   Gastrointestinal:  Negative for constipation, diarrhea, nausea and vomiting.   Endocrine: Negative for cold intolerance and heat intolerance.   Genitourinary:  Negative for flank pain.   Musculoskeletal:  Positive for gait problem and myalgias. Negative for neck pain and neck stiffness.   Skin:  Positive for wound. Negative for color change.   Neurological:  Positive for numbness. Negative for weakness, light-headedness and headaches.        + paresthesia    Psychiatric/Behavioral:  Negative for sleep disturbance.          Objective:        Physical Exam  Constitutional:       Appearance: He is well-developed.   Cardiovascular:      Comments: Dorsalis pedis and posterior tibial pulses are palpable Skin is atrophic, slightly hyperpigmented, and mildly edematous      Musculoskeletal:         General: No tenderness. Normal range of motion.      Comments: Muscle strength is 5/5 in all groups bilaterally.    S/p partial 5th ray amp b/l    S/p hallux amp right    Fat pad atrophy to heels and met heads bilateral        Skin:     General: Skin is warm and dry.      Coloration: Skin is not jaundiced, mottled or sallow.      Findings: Wound (see below) present. No abscess or ecchymosis.      Comments:        Neurological:      Mental Status: He is alert and oriented to person, place, and time.      Comments: [place]-Nenita 5.07 monofilamant testing is diminished Kenji feet. Sharp/dull sensation diminished Bilaterally. Light touch absent Bilaterally.       Psychiatric:         Behavior: Behavior normal.         Vitals:    03/22/24 0740   BP: (!) 151/73   Pulse: 91       Assessment:           ICD-10-CM ICD-9-CM   1. Type II diabetes mellitus with neurological manifestations  E11.49 250.60   2. Diabetic ulcer of left midfoot associated with type 2 diabetes mellitus, with fat layer exposed  E11.621 250.80    L97.422 707.14            (Retired) Wound 04/08/22 1137 Diabetic Ulcer Left plantar;lateral Foot (Active)   04/08/22 1137    Pre-existing: Yes   Primary Wound Type: Diabetic ulc   Side: Left   Orientation: plantar;lateral   Location: Foot   Wound Number:    Ankle-Brachial Index:    Pulses:    Removal Indication and Assessment:    Wound Outcome:    (Retired) Wound Type:    (Retired) Wound Length (cm):    (Retired) Wound Width (cm):    (Retired) Depth (cm):    Wound Description (Comments):    Removal Indications:    Wound Image   03/22/24 1503   Wound WDL ex 03/22/24 1503   Dressing Appearance Intact;Moist drainage 03/22/24 1503   Drainage Amount Moderate 03/22/24 1503   Drainage Characteristics/Odor Sanguineous 03/22/24 1503   Appearance Red;Pink;Moist;Tan 03/22/24 1503   Tissue loss description Full thickness 03/22/24 1503   Periwound Area Pale white;Moist;Macerated 03/22/24 1503   Wound Edges Open;Defined 03/22/24 1503   Wound Length (cm) 6.4 cm 03/22/24 1503   Wound Width (cm) 3 cm 03/22/24 1503   Wound Depth (cm) 0.2 cm 03/22/24 1503   Wound Volume (cm^3) 3.84 cm^3 03/22/24 1503   Wound Surface Area (cm^2) 19.2 cm^2 03/22/24  1503   Care Cleansed with:;Antimicrobial agent;Sterile normal saline;Wound cleanser 03/22/24 1503   Dressing Applied;Absorptive Pad;Foam;Cast padding;Compression wrap 03/22/24 1503   Periwound Care Moisture barrier applied 03/22/24 1503   Compression Two layer compression 03/22/24 1503   Off Loading Football dressing;Off loading shoe 03/22/24 1503   Dressing Change Due 03/28/24 03/22/24 1503             Plan:              Education about the prevention of limb loss.    Discussed wound healing cycle, skin integrity, ways to care for skin.Counseled patient on the effects of biomechanical pressure,  PVD, and blood glucose on healing. He verbalizes understanding that it can increase the chances of delayed healing and this prolonged exposure leads to infection or progression of infection which subsequently can result in loss of limb.    The site is cleansed of foreign material as much as possible,       Long-term goals include keeping the wound healed by good offloading and medical management under the direction of internist.    Return to Podiatry clinic on Thursday (03/28/24)    Wound Debridement    Date/Time: 3/22/2024 7:00 AM    Performed by: Marivel Peterson DPM  Authorized by: Marivel Peterson DPM      Wound Details:    Location:  Left foot    Location:  Left Midfoot    Type of Debridement:  Excisional       Length (cm):  6.4       Area (sq cm):  19.2       Width (cm):  3       Percent Debrided (%):  100       Depth (cm):  0.2       Total Area Debrided (sq cm):  19.2    Depth of debridement:  Subcutaneous tissue    Tissue debrided:  Dermis, Epidermis and Subcutaneous    Devitalized tissue debrided:  Callus and Fibrin    Instruments:  Curette  Bleeding:  Minimal  Hemostasis Achieved: Yes  Method Used:  Pressure  Patient tolerance:  Patient tolerated the procedure well with no immediate complications      Orders Placed This Encounter   Procedures    Debridement     This order was created via procedure documentation      "Standing Status:   Standing     Number of Occurrences:   1    Change dressing     Wound Dressing Orders      Dressing change frequency twice a week (prn nurse visit)   Remove old dressing   Cleanse or irrigate with: Normal Saline     Moisturize dry skin on leg with Enlivaderm     Protect periwound with:  Gentian Violet hugging wound bed   Primary dressing: WOUND BASE: [place] then UrgoTul Ag (cut to size of wound bed)   Secondary dressing: Silvercel Ag (Alginate, cut to size of wound bed) then Mextra (5" x 9"), 1 layer of cast padding then Mepilex (non-border foam) and ABD Pad (laid vertically) to offload   Compression/Off-load: Football (cast padding x 2 rolls, 1/2 roll fan folded to plantar for cushion). UrgoK2, size Large, 40 mmHg (ankle size 26 cm) and CAM Boot on the affected foot     Change at Nurse Visit          Follow up in about 6 days (around 3/28/2024) for wound care.       "

## 2024-03-26 ENCOUNTER — HOSPITAL ENCOUNTER (OUTPATIENT)
Dept: WOUND CARE | Facility: HOSPITAL | Age: 54
Discharge: HOME | End: 2024-03-26
Attending: PODIATRIST
Payer: MEDICARE

## 2024-03-26 VITALS
HEART RATE: 105 BPM | SYSTOLIC BLOOD PRESSURE: 120 MMHG | RESPIRATION RATE: 18 BRPM | DIASTOLIC BLOOD PRESSURE: 70 MMHG | TEMPERATURE: 97 F

## 2024-03-26 DIAGNOSIS — E11.621 DIABETIC ULCER OF LEFT MIDFOOT ASSOCIATED WITH TYPE 2 DIABETES MELLITUS, WITH FAT LAYER EXPOSED: ICD-10-CM

## 2024-03-26 DIAGNOSIS — E11.49 TYPE II DIABETES MELLITUS WITH NEUROLOGICAL MANIFESTATIONS: Primary | ICD-10-CM

## 2024-03-26 DIAGNOSIS — L97.422 DIABETIC ULCER OF LEFT MIDFOOT ASSOCIATED WITH TYPE 2 DIABETES MELLITUS, WITH FAT LAYER EXPOSED: ICD-10-CM

## 2024-03-26 PROCEDURE — 29581 APPL MULTLAYER CMPRN SYS LEG: CPT

## 2024-03-26 NOTE — PROGRESS NOTES
[facility]  [address]  [address]  Nurse Visit    Subjective:       Patient seen in clinic today. Patient ambulated to the exam room using knee scooter to off load left foot with prescribed CAM Boot the Left Foot. Denies pain, fever, chills, nausea, vomiting, or diarrhea at present. Dressing intact without strike through drainage. Dressing was change as order.       Assessment:          (Retired) Wound 04/08/22 1137 Diabetic Ulcer Left plantar;lateral Foot (Active)   04/08/22 1137    Pre-existing: Yes   Primary Wound Type: Diabetic ulc   Side: Left   Orientation: plantar;lateral   Location: Foot   Wound Number:    Ankle-Brachial Index:    Pulses:    Removal Indication and Assessment:    Wound Outcome:    (Retired) Wound Type:    (Retired) Wound Length (cm):    (Retired) Wound Width (cm):    (Retired) Depth (cm):    Wound Description (Comments):    Removal Indications:    Wound WDL ex 03/26/24 1153   Dressing Appearance Intact;Moist drainage 03/26/24 1153   Drainage Amount Moderate 03/26/24 1153   Drainage Characteristics/Odor Serosanguineous;No odor;Malodorous 03/26/24 1153   Appearance Tan;Moist;Red 03/26/24 1153   Tissue loss description Full thickness 03/26/24 1153   Red (%), Wound Tissue Color 80 % 03/26/24 1153   Yellow (%), Wound Tissue Color 20 % 03/26/24 1153   Periwound Area Macerated;Pale white 03/26/24 1153   Wound Edges Open;Defined 03/26/24 1153   Care Cleansed with:;Antimicrobial agent;Sterile normal saline 03/26/24 1153   Dressing Applied;Collagen;Cast padding;Absorptive Pad;Compression wrap 03/26/24 1153   Periwound Care Moisturizer applied;Topical treatment applied 03/26/24 1153   Compression Two layer compression 03/26/24 1153   Off Loading Football dressing;Off loading shoe 03/26/24 1153   Dressing Change Due 03/28/24 03/26/24 1153     Wound Dressing Orders     Dressing change frequency twice a week (prn nurse visit)  Remove old dressing  Cleanse or  "irrigate with: Normal Saline    Moisturize dry skin on leg with Enlivaderm    Protect periwound with:  Gentian Violet hugging wound bed  Primary dressing: WOUND BASE: [place] then UrgoTul Ag (cut to size of wound bed)  Secondary dressing: Silvercel Ag (Alginate, cut to size of wound bed) then Mextra (5" x 9"), 1 layer of cast padding then Mepilex (non-border foam) and ABD Pad (laid vertically) to offload  Compression/Off-load: Football (cast padding x 2 rolls, 1/2 roll fan folded to plantar for cushion). UrgoK2, size Large, 40 mmHg (ankle size 26 cm) and CAM Boot on the affected foot    Change at Nurse Visit           Plan:     No orders of the defined types were placed in this encounter.          Follow up in about 2 days (around 3/28/2024) for wound care.        "

## 2024-03-28 ENCOUNTER — OFFICE VISIT (OUTPATIENT)
Dept: PODIATRY | Facility: CLINIC | Age: 54
End: 2024-03-28
Payer: MEDICARE

## 2024-03-28 VITALS
SYSTOLIC BLOOD PRESSURE: 139 MMHG | HEART RATE: 99 BPM | WEIGHT: 220 LBS | HEIGHT: 73 IN | DIASTOLIC BLOOD PRESSURE: 86 MMHG | BODY MASS INDEX: 29.16 KG/M2

## 2024-03-28 DIAGNOSIS — E11.621 DIABETIC ULCER OF LEFT MIDFOOT ASSOCIATED WITH TYPE 2 DIABETES MELLITUS, WITH FAT LAYER EXPOSED: Primary | ICD-10-CM

## 2024-03-28 DIAGNOSIS — L97.422 DIABETIC ULCER OF LEFT MIDFOOT ASSOCIATED WITH TYPE 2 DIABETES MELLITUS, WITH FAT LAYER EXPOSED: Primary | ICD-10-CM

## 2024-03-28 DIAGNOSIS — E11.49 TYPE II DIABETES MELLITUS WITH NEUROLOGICAL MANIFESTATIONS: ICD-10-CM

## 2024-03-28 PROCEDURE — 99999 PR PBB SHADOW E&M-EST. PATIENT-LVL III: CPT | Mod: PBBFAC,,, | Performed by: PODIATRIST

## 2024-03-28 PROCEDURE — 11042 DBRDMT SUBQ TIS 1ST 20SQCM/<: CPT | Mod: PBBFAC,PO | Performed by: PODIATRIST

## 2024-03-28 PROCEDURE — 99499 UNLISTED E&M SERVICE: CPT | Mod: S$PBB,,, | Performed by: PODIATRIST

## 2024-03-28 PROCEDURE — 99213 OFFICE O/P EST LOW 20 MIN: CPT | Mod: PBBFAC,PO | Performed by: PODIATRIST

## 2024-04-01 NOTE — PROGRESS NOTES
"[facility]                Progress Note    Subjective:       Patient ID: Indio Ryder Jr. is a 53 y.o. male.    Chief Complaint: Diabetes Mellitus    Presents to podiatry clinic for left foot wound, usually followed in wound clinic. Patient ambulated to the exam room using knee scooter. Patient is wearing CAM boot on left foot and diabetic shoe with lift on the right foot. Denies pain, fever, chills, vomiting, nausea, or diarrhea at present. Dressing intact without strike through drainage.       Wound Check  There is no pain present.     Review of Systems   Constitutional:  Negative for appetite change, chills, fatigue and fever.   HENT:  Negative for hearing loss.    Eyes:  Negative for photophobia and visual disturbance.   Respiratory:  Negative for cough, chest tightness, shortness of breath and wheezing.    Cardiovascular:  Positive for leg swelling. Negative for chest pain and palpitations.   Gastrointestinal:  Negative for constipation, diarrhea, nausea and vomiting.   Endocrine: Negative for cold intolerance and heat intolerance.   Genitourinary:  Negative for flank pain.   Musculoskeletal:  Positive for gait problem and myalgias. Negative for neck pain and neck stiffness.   Skin:  Positive for wound. Negative for color change.   Neurological:  Positive for numbness. Negative for weakness, light-headedness and headaches.        + paresthesia    Psychiatric/Behavioral:  Negative for sleep disturbance.          Objective:     Vitals:    03/28/24 0936   BP: 139/86   Pulse: 99   Weight: 99.8 kg (220 lb 0.3 oz)   Height: 6' 1" (1.854 m)          Physical Exam  Constitutional:       Appearance: He is well-developed.   Cardiovascular:      Comments: Dorsalis pedis and posterior tibial pulses are palpable Skin is atrophic, slightly hyperpigmented, and mildly edematous      Musculoskeletal:         General: No tenderness. Normal range of motion.      Comments: Muscle " strength is 5/5 in all groups bilaterally.    S/p partial 5th ray amp b/l    S/p hallux amp right    Fat pad atrophy to heels and met heads bilateral       Skin:     General: Skin is warm and dry.      Coloration: Skin is not jaundiced, mottled or sallow.      Findings: Wound (see below) present. No abscess or ecchymosis.      Comments:        Neurological:      Mental Status: He is alert and oriented to person, place, and time.      Comments: [place]-Nenita 5.07 monofilamant testing is diminished Kenji feet. Sharp/dull sensation diminished Bilaterally. Light touch absent Bilaterally.       Psychiatric:         Behavior: Behavior normal.         3/28/2024          Assessment:           ICD-10-CM ICD-9-CM   1. Diabetic ulcer of left midfoot associated with type 2 diabetes mellitus, with fat layer exposed  E11.621 250.80    L97.422 707.14   2. Type II diabetes mellitus with neurological manifestations  E11.49 250.60           Plan:         Education about the prevention of limb loss.    Discussed wound healing cycle, skin integrity, ways to care for skin.Counseled patient on the effects of biomechanical pressure,  PVD, and blood glucose on healing. He verbalizes understanding that it can increase the chances of delayed healing and this prolonged exposure leads to infection or progression of infection which subsequently can result in loss of limb.    The site is cleansed of foreign material as much as possible,     Long-term goals include keeping the wound healed by good offloading and medical management under the direction of internist.    Wound Debridement    Date/Time: 3/28/2024 9:30 AM    Performed by: Marivel Peterson DPM  Authorized by: Marivel Peterson DPM      Wound Details:    Location:  Left foot    Location:  Left Midfoot    Type of Debridement:  Excisional       Length (cm):  6.7       Area (sq cm):  18.09       Width (cm):  2.7       Percent Debrided (%):  100       Depth (cm):  0.2       Total Area Debrided  (sq cm):  18.09    Depth of debridement:  Subcutaneous tissue    Tissue debrided:  Dermis, Epidermis and Subcutaneous    Devitalized tissue debrided:  Fibrin and Callus    Instruments:  Curette  Bleeding:  Minimal  Hemostasis Achieved: Yes  Method Used:  Pressure  Patient tolerance:  Patient tolerated the procedure well with no immediate complications      Orders Placed This Encounter   Procedures    Wound Debridement     This order was created via procedure documentation          No follow-ups on file.

## 2024-04-02 ENCOUNTER — HOSPITAL ENCOUNTER (OUTPATIENT)
Dept: WOUND CARE | Facility: HOSPITAL | Age: 54
Discharge: HOME | End: 2024-04-02
Attending: PODIATRIST
Payer: MEDICARE

## 2024-04-02 VITALS
TEMPERATURE: 98 F | SYSTOLIC BLOOD PRESSURE: 140 MMHG | HEART RATE: 98 BPM | RESPIRATION RATE: 18 BRPM | DIASTOLIC BLOOD PRESSURE: 92 MMHG

## 2024-04-02 DIAGNOSIS — E11.621 DIABETIC ULCER OF LEFT MIDFOOT ASSOCIATED WITH TYPE 2 DIABETES MELLITUS, WITH BONE INVOLVEMENT WITHOUT EVIDENCE OF NECROSIS: Primary | ICD-10-CM

## 2024-04-02 DIAGNOSIS — E11.49 TYPE II DIABETES MELLITUS WITH NEUROLOGICAL MANIFESTATIONS: ICD-10-CM

## 2024-04-02 DIAGNOSIS — L97.426 DIABETIC ULCER OF LEFT MIDFOOT ASSOCIATED WITH TYPE 2 DIABETES MELLITUS, WITH BONE INVOLVEMENT WITHOUT EVIDENCE OF NECROSIS: Primary | ICD-10-CM

## 2024-04-02 PROCEDURE — 29581 APPL MULTLAYER CMPRN SYS LEG: CPT

## 2024-04-02 PROCEDURE — 87077 CULTURE AEROBIC IDENTIFY: CPT | Mod: 59 | Performed by: FAMILY MEDICINE

## 2024-04-02 PROCEDURE — 87186 SC STD MICRODIL/AGAR DIL: CPT | Mod: 59 | Performed by: FAMILY MEDICINE

## 2024-04-02 PROCEDURE — 87070 CULTURE OTHR SPECIMN AEROBIC: CPT | Performed by: FAMILY MEDICINE

## 2024-04-02 PROCEDURE — 87075 CULTR BACTERIA EXCEPT BLOOD: CPT | Performed by: FAMILY MEDICINE

## 2024-04-02 NOTE — PROGRESS NOTES
[facility] [address]  [address]  Nurse Visit    Subjective:       Patient seen in clinic today. Patient ambulated to exam room using knee scooter to off-load left foot with CAM boot on LLE and diabetic tennis shoe w/lift on right foot. He denies fever, chills, nausea, vomiting or diarrhea. No c/o pain or discomfort to wound bed at present. Dressing is intact to LLE, with a quarter sized amount of strike through, green, malodorous drainage. Consulted with MD on duty (Dr. Burrell), Anaerobic and Aerobic culture of wound sent to lab. Prior to application, patient's ankle circumference measures 25 cm and has current ALISSA of 1.13, which correlates with UrgoK2, Large, 40 mmHg. Dressing changed as ordered.      Assessment:          (Retired) Wound 04/08/22 1137 Diabetic Ulcer Left plantar;lateral Foot (Active)   04/08/22 1137    Pre-existing: Yes   Primary Wound Type: Diabetic ulc   Side: Left   Orientation: plantar;lateral   Location: Foot   Wound Number:    Ankle-Brachial Index:    Pulses:    Removal Indication and Assessment:    Wound Outcome:    (Retired) Wound Type:    (Retired) Wound Length (cm):    (Retired) Wound Width (cm):    (Retired) Depth (cm):    Wound Description (Comments):    Removal Indications:    Wound Image   04/02/24 1105   Wound WDL ex 04/02/24 1105   Dressing Appearance Intact;Moist drainage 04/02/24 1105   Drainage Amount Large 04/02/24 1105   Drainage Characteristics/Odor Serosanguineous;Green;Malodorous 04/02/24 1105   Appearance Pink;Moist 04/02/24 1105   Tissue loss description Full thickness 04/02/24 1105   Black (%), Wound Tissue Color 0 % 04/02/24 1105   Red (%), Wound Tissue Color 100 % 04/02/24 1105   Yellow (%), Wound Tissue Color 0 % 04/02/24 1105   Periwound Area Pale white;Moist;Macerated 04/02/24 1105   Wound Edges Defined;Open 04/02/24 1105   Care Cleansed with:;Antimicrobial agent;Sterile normal saline;Wound cleanser 04/02/24 1105   Dressing  "Applied;Collagen;Hydrofiber;Absorptive Pad;Foam;Cast padding;Compression wrap 04/02/24 1105   Periwound Care Moisture barrier applied 04/02/24 1105   Compression Two layer compression 04/02/24 1105   Off Loading Football dressing;Off loading shoe 04/02/24 1105   Dressing Change Due 04/04/24 04/02/24 1105           Plan:     Orders Placed This Encounter   Procedures    CULTURE, ANAEROBE          CULTURE, AEROBIC  (SPECIFY SOURCE)           Wound Dressing Orders     Dressing change frequency twice a week (prn nurse visit)  Remove old dressing  Cleanse or irrigate with: Vinegar 5% x 10 minute soak    Moisturize dry skin on leg with Enlivaderm    Protect periwound with:  Gentian Violet hugging wound bed  Primary dressing: WOUND BASE: [place]   Secondary dressing: Custom Felt Pad (with large hole to allow for drainage) then Drawtex (cut to size of felt pad hole, 2 layers), then Mextra (5" x 5"), 1 layer of cast padding then Mepilex (non-border foam) and ABD Pad (laid vertically) to offload  Compression/Off-load: Football (cast padding x 2 rolls). UrgoK2, size Large, 40 mmHg (ankle size 25 cm) and CAM Boot on the affected foot      Follow up in about 2 days (around 04/04/2024) for Wound Care.         "

## 2024-04-04 ENCOUNTER — PATIENT MESSAGE (OUTPATIENT)
Dept: FAMILY MEDICINE | Facility: CLINIC | Age: 54
End: 2024-04-04
Payer: MEDICARE

## 2024-04-04 ENCOUNTER — OFFICE VISIT (OUTPATIENT)
Dept: PODIATRY | Facility: CLINIC | Age: 54
End: 2024-04-04
Payer: MEDICARE

## 2024-04-04 VITALS
SYSTOLIC BLOOD PRESSURE: 157 MMHG | DIASTOLIC BLOOD PRESSURE: 100 MMHG | WEIGHT: 220 LBS | HEIGHT: 73 IN | HEART RATE: 95 BPM | BODY MASS INDEX: 29.16 KG/M2

## 2024-04-04 DIAGNOSIS — E11.621 DIABETIC ULCER OF LEFT MIDFOOT ASSOCIATED WITH TYPE 2 DIABETES MELLITUS, WITH BONE INVOLVEMENT WITHOUT EVIDENCE OF NECROSIS: Primary | ICD-10-CM

## 2024-04-04 DIAGNOSIS — E11.621 DIABETIC ULCER OF LEFT MIDFOOT ASSOCIATED WITH TYPE 2 DIABETES MELLITUS, WITH FAT LAYER EXPOSED: Primary | ICD-10-CM

## 2024-04-04 DIAGNOSIS — L97.426 DIABETIC ULCER OF LEFT MIDFOOT ASSOCIATED WITH TYPE 2 DIABETES MELLITUS, WITH BONE INVOLVEMENT WITHOUT EVIDENCE OF NECROSIS: Primary | ICD-10-CM

## 2024-04-04 DIAGNOSIS — E11.49 TYPE II DIABETES MELLITUS WITH NEUROLOGICAL MANIFESTATIONS: ICD-10-CM

## 2024-04-04 DIAGNOSIS — L97.422 DIABETIC ULCER OF LEFT MIDFOOT ASSOCIATED WITH TYPE 2 DIABETES MELLITUS, WITH FAT LAYER EXPOSED: Primary | ICD-10-CM

## 2024-04-04 LAB
BACTERIA SPEC AEROBE CULT: ABNORMAL
BACTERIA SPEC AEROBE CULT: ABNORMAL

## 2024-04-04 PROCEDURE — 11045 DBRDMT SUBQ TISS EACH ADDL: CPT | Mod: S$PBB,,, | Performed by: PODIATRIST

## 2024-04-04 PROCEDURE — 11042 DBRDMT SUBQ TIS 1ST 20SQCM/<: CPT | Mod: PBBFAC,PO | Performed by: PODIATRIST

## 2024-04-04 PROCEDURE — 99499 UNLISTED E&M SERVICE: CPT | Mod: S$PBB,,, | Performed by: PODIATRIST

## 2024-04-04 PROCEDURE — 99213 OFFICE O/P EST LOW 20 MIN: CPT | Mod: PBBFAC,PO,25 | Performed by: PODIATRIST

## 2024-04-04 PROCEDURE — 99999 PR PBB SHADOW E&M-EST. PATIENT-LVL III: CPT | Mod: PBBFAC,,, | Performed by: PODIATRIST

## 2024-04-04 RX ORDER — LEVOFLOXACIN 750 MG/1
750 TABLET ORAL DAILY
Qty: 7 TABLET | Refills: 0 | Status: SHIPPED | OUTPATIENT
Start: 2024-04-04 | End: 2024-04-11

## 2024-04-04 NOTE — PROGRESS NOTES
"[facility]                Progress Note    Subjective:       Patient ID: Indio Ryder Jr. is a 53 y.o. male.    Chief Complaint: Diabetes Mellitus and Wound Check    Presents to podiatry clinic for left foot wound, usually followed in wound clinic. Patient ambulated to the exam room using knee scooter. Patient is wearing CAM boot on left foot and diabetic shoe with lift on the right foot. Denies pain, fever, chills, vomiting, nausea, or diarrhea at present.  Dressing intact without strike through drainage. Wound swab on nurse encounter.  Started on abx by another wound care provider      Wound Check  There is no pain present.     Review of Systems   Constitutional:  Negative for appetite change, chills, fatigue and fever.   HENT:  Negative for hearing loss.    Eyes:  Negative for photophobia and visual disturbance.   Respiratory:  Negative for cough, chest tightness, shortness of breath and wheezing.    Cardiovascular:  Positive for leg swelling. Negative for chest pain and palpitations.   Gastrointestinal:  Negative for constipation, diarrhea, nausea and vomiting.   Endocrine: Negative for cold intolerance and heat intolerance.   Genitourinary:  Negative for flank pain.   Musculoskeletal:  Positive for gait problem and myalgias. Negative for neck pain and neck stiffness.   Skin:  Positive for wound. Negative for color change.   Neurological:  Positive for numbness. Negative for weakness, light-headedness and headaches.        + paresthesia    Psychiatric/Behavioral:  Negative for sleep disturbance.          Objective:     Vitals:    04/04/24 0849   BP: (!) 157/100   Pulse: 95   Weight: 99.8 kg (220 lb 0.3 oz)   Height: 6' 1" (1.854 m)            Physical Exam  Constitutional:       Appearance: He is well-developed.   Cardiovascular:      Comments: Dorsalis pedis and posterior tibial pulses are palpable Skin is atrophic, slightly hyperpigmented, and mildly " edematous      Musculoskeletal:         General: No tenderness. Normal range of motion.      Comments: Muscle strength is 5/5 in all groups bilaterally.    S/p partial 5th ray amp b/l    S/p hallux amp right    Fat pad atrophy to heels and met heads bilateral       Skin:     General: Skin is warm and dry.      Coloration: Skin is not jaundiced, mottled or sallow.      Findings: Wound (see below) present. No abscess or ecchymosis.      Comments:        Neurological:      Mental Status: He is alert and oriented to person, place, and time.      Comments: [place]-Nenita 5.07 monofilamant testing is diminished Kenji feet. Sharp/dull sensation diminished Bilaterally. Light touch absent Bilaterally.       Psychiatric:         Behavior: Behavior normal.         04/04/2024 04/02/24 Nurse visit image        3/28/2024          Assessment:           ICD-10-CM ICD-9-CM   1. Diabetic ulcer of left midfoot associated with type 2 diabetes mellitus, with fat layer exposed  E11.621 250.80    L97.422 707.14   2. Type II diabetes mellitus with neurological manifestations  E11.49 250.60             Plan:         Education about the prevention of limb loss.    Discussed wound healing cycle, skin integrity, ways to care for skin.Counseled patient on the effects of biomechanical pressure,  PVD, and blood glucose on healing. He verbalizes understanding that it can increase the chances of delayed healing and this prolonged exposure leads to infection or progression of infection which subsequently can result in loss of limb.    The site is cleansed of foreign material as much as possible, regression noted    Patient would benefit from skin substitute      Wound Debridement    Date/Time: 4/4/2024 9:00 AM    Performed by: Marivel Peterson DPM  Authorized by: Marivel Peterson DPM      Wound Details:    Location:  Left foot    Location:  Left Midfoot    Type of Debridement:  Excisional       Length (cm):  7       Area (sq cm):  20.3        Width (cm):  2.9       Percent Debrided (%):  100       Depth (cm):  0.2       Total Area Debrided (sq cm):  20.3    Depth of debridement:  Subcutaneous tissue    Tissue debrided:  Dermis, Epidermis and Subcutaneous    Devitalized tissue debrided:  Callus and Fibrin    Instruments:  Curette  Bleeding:  Minimal  Hemostasis Achieved: Yes  Method Used:  Pressure  Patient tolerance:  Patient tolerated the procedure well with no immediate complications      Orders Placed This Encounter   Procedures    Wound Debridement     This order was created via procedure documentation          No follow-ups on file.

## 2024-04-06 LAB — BACTERIA SPEC ANAEROBE CULT: NORMAL

## 2024-04-09 ENCOUNTER — HOSPITAL ENCOUNTER (OUTPATIENT)
Dept: WOUND CARE | Facility: HOSPITAL | Age: 54
Discharge: HOME | End: 2024-04-09
Attending: PODIATRIST
Payer: MEDICARE

## 2024-04-09 VITALS — HEART RATE: 97 BPM | TEMPERATURE: 98 F | DIASTOLIC BLOOD PRESSURE: 89 MMHG | SYSTOLIC BLOOD PRESSURE: 153 MMHG

## 2024-04-09 DIAGNOSIS — L97.426 DIABETIC ULCER OF LEFT MIDFOOT ASSOCIATED WITH TYPE 2 DIABETES MELLITUS, WITH BONE INVOLVEMENT WITHOUT EVIDENCE OF NECROSIS: Primary | ICD-10-CM

## 2024-04-09 DIAGNOSIS — E11.49 TYPE II DIABETES MELLITUS WITH NEUROLOGICAL MANIFESTATIONS: ICD-10-CM

## 2024-04-09 DIAGNOSIS — E11.621 DIABETIC ULCER OF LEFT MIDFOOT ASSOCIATED WITH TYPE 2 DIABETES MELLITUS, WITH BONE INVOLVEMENT WITHOUT EVIDENCE OF NECROSIS: Primary | ICD-10-CM

## 2024-04-09 PROCEDURE — 29581 APPL MULTLAYER CMPRN SYS LEG: CPT

## 2024-04-09 NOTE — PROGRESS NOTES
[facility]  [address]  [id number]  Nurse Visit    Subjective:       Patient seen in clinic today. Patient ambulated to exam room using knee scooter to off-load left foot with CAM boot on LLE and diabetic tennis shoe w/lift on right foot. He denies fever, chills, nausea, vomiting or diarrhea. No c/o pain or discomfort to wound bed at present. Dressing is intact to LLE, with no strike through drainage, but malodor still noted. Culture of wound returned positive for Pseudomonas and Klebsiella, MD sent Levaquin and patient reports picking up and starting therapy. Prior to application, patient's ankle circumference measures 25.5 cm and has current ALISSA of 1.13, which correlates with UrgoK2, Large, 40 mmHg. Dressing changed as ordered.      Assessment:          (Retired) Wound 04/08/22 1137 Diabetic Ulcer Left plantar;lateral Foot (Active)   04/08/22 1137    Pre-existing: Yes   Primary Wound Type: Diabetic ulc   Side: Left   Orientation: plantar;lateral   Location: Foot   Wound Number:    Ankle-Brachial Index:    Pulses:    Removal Indication and Assessment:    Wound Outcome:    (Retired) Wound Type:    (Retired) Wound Length (cm):    (Retired) Wound Width (cm):    (Retired) Depth (cm):    Wound Description (Comments):    Removal Indications:    Wound Image   04/09/24 0931   Wound WDL ex 04/09/24 0931   Dressing Appearance Intact;Moist drainage 04/09/24 0931   Drainage Amount Large 04/09/24 0931   Drainage Characteristics/Odor Serosanguineous;Malodorous 04/09/24 0931   Appearance Pink;Red;Moist 04/09/24 0931   Tissue loss description Full thickness 04/09/24 0931   Black (%), Wound Tissue Color 0 % 04/09/24 0931   Red (%), Wound Tissue Color 100 % 04/09/24 0931   Yellow (%), Wound Tissue Color 0 % 04/09/24 0931   Periwound Area Pale white;Macerated 04/09/24 0931   Wound Edges Defined;Open 04/09/24 0931   Care Cleansed with:;Antimicrobial agent;Sterile normal saline;Wound cleanser  "04/09/24 0931   Dressing Applied;Calcium alginate;Hydrofiber;Absorptive Pad;Cast padding;Compression wrap 04/09/24 0931   Periwound Care Moisture barrier applied;Absorptive dressing applied 04/09/24 0931   Compression Two layer compression 04/09/24 0931   Off Loading Football dressing;Off loading shoe 04/09/24 0931   Dressing Change Due 04/12/24 04/09/24 0931           Plan:     Wound Dressing Orders     Dressing change frequency twice a week (prn nurse visit)  Remove old dressing  Cleanse or irrigate with: Vinegar 5% x 10 minute soak    Moisturize dry skin on leg with Enlivaderm    Protect periwound with:  Gentian Violet hugging wound bed  Primary dressing: WOUND BASE: Meropenem ABX topical powder then Silvercel Ag (Alginate, cut to size of wound bed and applied between toes)   Secondary dressing: Custom Felt Pad (with large hole to allow for drainage) then Drawtex (cut to size of felt pad hole), then Mextra (5" x 9", laid vertically), 1 layer of cast padding then Mepilex (non-border foam) and ABD Pad (laid vertically) to offload  Compression/Off-load: Football (cast padding x 2 rolls). UrgoK2, size Large, 40 mmHg (ankle size 25.5 cm) and CAM Boot on the affected foot          Follow up in about 3 days (around 4/12/2024) for Wound Care.          "

## 2024-04-12 ENCOUNTER — HOSPITAL ENCOUNTER (OUTPATIENT)
Dept: WOUND CARE | Facility: HOSPITAL | Age: 54
Discharge: HOME | End: 2024-04-12
Attending: PODIATRIST
Payer: MEDICARE

## 2024-04-12 VITALS
HEART RATE: 92 BPM | SYSTOLIC BLOOD PRESSURE: 130 MMHG | TEMPERATURE: 97 F | DIASTOLIC BLOOD PRESSURE: 77 MMHG | HEIGHT: 73 IN | WEIGHT: 220 LBS | BODY MASS INDEX: 29.16 KG/M2

## 2024-04-12 DIAGNOSIS — L97.426 DIABETIC ULCER OF LEFT MIDFOOT ASSOCIATED WITH TYPE 2 DIABETES MELLITUS, WITH BONE INVOLVEMENT WITHOUT EVIDENCE OF NECROSIS: Primary | ICD-10-CM

## 2024-04-12 DIAGNOSIS — E11.621 DIABETIC ULCER OF LEFT MIDFOOT ASSOCIATED WITH TYPE 2 DIABETES MELLITUS, WITH BONE INVOLVEMENT WITHOUT EVIDENCE OF NECROSIS: Primary | ICD-10-CM

## 2024-04-12 DIAGNOSIS — E11.49 TYPE II DIABETES MELLITUS WITH NEUROLOGICAL MANIFESTATIONS: ICD-10-CM

## 2024-04-12 PROCEDURE — 99499 UNLISTED E&M SERVICE: CPT | Mod: ,,, | Performed by: PODIATRIST

## 2024-04-12 PROCEDURE — 11042 DBRDMT SUBQ TIS 1ST 20SQCM/<: CPT | Performed by: PODIATRIST

## 2024-04-12 PROCEDURE — 11042 DBRDMT SUBQ TIS 1ST 20SQCM/<: CPT | Mod: ,,, | Performed by: PODIATRIST

## 2024-04-12 NOTE — PROGRESS NOTES
[facility]                Progress Note    Subjective:       Patient ID: Indio Ryder Jr. is a 53 y.o. male.    Chief Complaint: Wound Check and Dressing Change    Follow up wound care visit. Patient ambulated to exam room using knee scooter to off-load left foot with CAM boot on LLE and diabetic tennis shoe w/lift on right foot. He denies fever, chills, nausea, vomiting or diarrhea. No c/o pain or discomfort to wound bed at present. Dressing is intact to LLE, with no strike through drainage. Patient reports completing course of oral ABX Levaquin.     Prior to application, patient's ankle circumference measures 25.5 cm and has current ALISSA of 1.13, which correlates with UrgoK2, Large, 40 mmHg          Review of Systems   Constitutional:  Negative for appetite change, chills, fatigue and fever.   HENT:  Negative for hearing loss.    Eyes:  Negative for photophobia and visual disturbance.   Respiratory:  Negative for cough, chest tightness, shortness of breath and wheezing.    Cardiovascular:  Positive for leg swelling. Negative for chest pain and palpitations.   Gastrointestinal:  Negative for constipation, diarrhea, nausea and vomiting.   Endocrine: Negative for cold intolerance and heat intolerance.   Genitourinary:  Negative for flank pain.   Musculoskeletal:  Positive for gait problem and myalgias. Negative for neck pain and neck stiffness.   Skin:  Positive for wound. Negative for color change.   Neurological:  Positive for numbness. Negative for weakness, light-headedness and headaches.        + paresthesia    Psychiatric/Behavioral:  Negative for sleep disturbance.          Objective:        Physical Exam  Constitutional:       Appearance: He is well-developed.   Cardiovascular:      Comments: Dorsalis pedis and posterior tibial pulses are palpable Skin is atrophic, slightly hyperpigmented, and mildly edematous      Musculoskeletal:         General: No  tenderness. Normal range of motion.      Comments: Muscle strength is 5/5 in all groups bilaterally.    S/p partial 5th ray amp b/l    S/p hallux amp right    Fat pad atrophy to heels and met heads bilateral       Skin:     General: Skin is warm and dry.      Coloration: Skin is not jaundiced, mottled or sallow.      Findings: Wound (see below) present. No abscess or ecchymosis.      Comments:        Neurological:      Mental Status: He is alert and oriented to person, place, and time.      Comments: [place]-Nenita 5.07 monofilamant testing is diminished Kenji feet. Sharp/dull sensation diminished Bilaterally. Light touch absent Bilaterally.       Psychiatric:         Behavior: Behavior normal.         Vitals:    04/12/24 0750   BP: 130/77   Pulse: 92   Temp: 97.3 °F (36.3 °C)       Assessment:           ICD-10-CM ICD-9-CM   1. Diabetic ulcer of left midfoot associated with type 2 diabetes mellitus, with bone involvement without evidence of necrosis  E11.621 250.80    L97.426 707.14   2. Type II diabetes mellitus with neurological manifestations  E11.49 250.60            (Retired) Wound 04/08/22 1137 Diabetic Ulcer Left plantar;lateral Foot (Active)   04/08/22 1137    Pre-existing: Yes   Primary Wound Type: Diabetic ulc   Side: Left   Orientation: plantar;lateral   Location: Foot   Wound Number:    Ankle-Brachial Index:    Pulses:    Removal Indication and Assessment:    Wound Outcome:    (Retired) Wound Type:    (Retired) Wound Length (cm):    (Retired) Wound Width (cm):    (Retired) Depth (cm):    Wound Description (Comments):    Removal Indications:    Wound Image   04/12/24 1520   Wound WDL ex 04/12/24 1520   Dressing Appearance Intact;Moist drainage 04/12/24 1520   Drainage Amount Large 04/12/24 1520   Drainage Characteristics/Odor Serosanguineous;No odor 04/12/24 1520   Appearance Pink;Red;Moist 04/12/24 1520   Tissue loss description Full thickness 04/12/24 1520   Black (%), Wound Tissue Color 0 % 04/12/24  1520   Red (%), Wound Tissue Color 100 % 04/12/24 1520   Yellow (%), Wound Tissue Color 0 % 04/12/24 1520   Periwound Area Pale white;Moist;Macerated 04/12/24 1520   Wound Edges Defined;Open 04/12/24 1520   Wound Length (cm) 6.2 cm 04/12/24 1520   Wound Width (cm) 2.9 cm 04/12/24 1520   Wound Depth (cm) 0.2 cm 04/12/24 1520   Wound Volume (cm^3) 3.596 cm^3 04/12/24 1520   Wound Surface Area (cm^2) 17.98 cm^2 04/12/24 1520   Tunneling (depth (cm)/location) 0 04/12/24 1520   Undermining (depth (cm)/location) 0 04/12/24 1520   Care Cleansed with:;Antimicrobial agent;Sterile normal saline;Wound cleanser 04/12/24 1520   Dressing Changed 04/12/24 1520   Periwound Care Moisture barrier applied;Absorptive dressing applied 04/12/24 1520   Compression Two layer compression 04/12/24 1520   Off Loading Football dressing;Off loading shoe 04/12/24 1520   Dressing Change Due 04/16/24 04/12/24 1520           Plan:                Education about the prevention of limb loss.    Discussed wound healing cycle, skin integrity, ways to care for skin.Counseled patient on the effects of biomechanical pressure,  PVD, and blood glucose on healing. He verbalizes understanding that it can increase the chances of delayed healing and this prolonged exposure leads to infection or progression of infection which subsequently can result in loss of limb.      Wound Debridement    Date/Time: 4/12/2024 7:00 AM    Performed by: Marivel Peterson DPM  Authorized by: Marivel Peterson DPM      Wound Details:    Location:  Left foot    Location:  Left Midfoot    Type of Debridement:  Excisional       Length (cm):  6.2       Area (sq cm):  17.98       Width (cm):  2.9       Percent Debrided (%):  100       Depth (cm):  0.2       Total Area Debrided (sq cm):  17.98    Depth of debridement:  Subcutaneous tissue    Tissue debrided:  Dermis, Epidermis and Subcutaneous    Devitalized tissue debrided:  Fibrin and Callus    Instruments:  Curette  Bleeding:   "Minimal  Hemostasis Achieved: Yes  Method Used:  Pressure  Patient tolerance:  Patient tolerated the procedure well with no immediate complications      The site is cleansed of foreign material as much as possible, stagnant    Patient would benefit from skin substitute, plans for Puraply XT pending insurance approval.     Tissue pathology and/or culture taken     [] Yes      [x] No  Sharp debridement performed                   [x] Yes       [] No  Labs ordered     [] Yes       [x] No  Imaging ordered    [] Yes      [x] No    Orders Placed This Encounter   Procedures    Debridement     This order was created via procedure documentation     Standing Status:   Standing     Number of Occurrences:   1    Change dressing     Wound Dressing Orders     Dressing change frequency twice a week (prn nurse visit)  Remove old dressing  Cleanse or irrigate with: Normal Saline    Moisturize dry skin on leg with Enlivaderm    Protect periwound with:  Gentian Violet hugging wound bed  Primary dressing: WOUND BASE: Meropenem ABX powder to wound bed then Silvercel Ag(cut to size of wound bed, 1 inch of distal aspect cover with Hydrofera Blue CLASSIC - moistened with saline then cut to size)  Secondary dressing: Custom Felt pad with large hole cut to allow for drainage to Mextra (5" x 5"), 1 layer of cast padding then Mepilex (non-border foam)   Compression/Off-load: Football (cast padding x 2 rolls, 1/2 roll fan folded to plantar for cushion). UrgoK2, size Large, 40 mmHg (ankle size 26 cm) and CAM Boot on the affected foot    Change at Nurse Visit        Follow up in about 4 days (around 4/16/2024) for Nurse Visit.       "

## 2024-04-16 ENCOUNTER — HOSPITAL ENCOUNTER (OUTPATIENT)
Dept: WOUND CARE | Facility: HOSPITAL | Age: 54
Discharge: HOME | End: 2024-04-16
Attending: FAMILY MEDICINE
Payer: MEDICARE

## 2024-04-16 VITALS — SYSTOLIC BLOOD PRESSURE: 107 MMHG | TEMPERATURE: 97 F | DIASTOLIC BLOOD PRESSURE: 60 MMHG | HEART RATE: 101 BPM

## 2024-04-16 DIAGNOSIS — E11.49 TYPE II DIABETES MELLITUS WITH NEUROLOGICAL MANIFESTATIONS: ICD-10-CM

## 2024-04-16 DIAGNOSIS — E11.621 DIABETIC ULCER OF LEFT MIDFOOT ASSOCIATED WITH TYPE 2 DIABETES MELLITUS, WITH BONE INVOLVEMENT WITHOUT EVIDENCE OF NECROSIS: Primary | ICD-10-CM

## 2024-04-16 DIAGNOSIS — L97.426 DIABETIC ULCER OF LEFT MIDFOOT ASSOCIATED WITH TYPE 2 DIABETES MELLITUS, WITH BONE INVOLVEMENT WITHOUT EVIDENCE OF NECROSIS: Primary | ICD-10-CM

## 2024-04-16 PROCEDURE — 29581 APPL MULTLAYER CMPRN SYS LEG: CPT

## 2024-04-16 NOTE — PROGRESS NOTES
[facility]  [address]  [id number]  Nurse Visit    Subjective:       Patient seen in clinic today. Patient ambulated to exam room using knee scooter to off-load left foot with CAM boot on LLE and diabetic tennis shoe w/lift on right foot. He denies fever, chills, nausea, vomiting or diarrhea. No c/o pain or discomfort to wound bed at present. Dressing is intact to LLE, with strike through drainage the size of wound bed to plantar aspect of UrgoK2 outer layer. Patient reports restarting Amoxicillin oral therapy. Prior to application, patient's ankle circumference measures 25 cm and has current ALISSA of 1.13, which correlates with UrgoK2, Large, 40 mmHg. Dressing changed as ordered.      Assessment:          (Retired) Wound 04/08/22 1137 Diabetic Ulcer Left plantar;lateral Foot (Active)   04/08/22 1137    Pre-existing: Yes   Primary Wound Type: Diabetic ulc   Side: Left   Orientation: plantar;lateral   Location: Foot   Wound Number:    Ankle-Brachial Index:    Pulses:    Removal Indication and Assessment:    Wound Outcome:    (Retired) Wound Type:    (Retired) Wound Length (cm):    (Retired) Wound Width (cm):    (Retired) Depth (cm):    Wound Description (Comments):    Removal Indications:    Wound WDL ex 04/16/24 1028   Dressing Appearance Intact;Moist drainage;Area marked 04/16/24 1028   Drainage Amount Large 04/16/24 1028   Drainage Characteristics/Odor Serosanguineous 04/16/24 1028   Appearance Red;Moist 04/16/24 1028   Tissue loss description Full thickness 04/16/24 1028   Black (%), Wound Tissue Color 0 % 04/16/24 1028   Red (%), Wound Tissue Color 100 % 04/16/24 1028   Yellow (%), Wound Tissue Color 0 % 04/16/24 1028   Periwound Area Pale white;Macerated 04/16/24 1028   Wound Edges Callused;Defined;Open 04/16/24 1028   Care Cleansed with:;Antimicrobial agent;Sterile normal saline 04/16/24 1028   Dressing Applied;Methylene blue/gentian violet;Hydrofiber;Absorptive  "Pad;Foam;Cast padding;Compression wrap 04/16/24 1028   Periwound Care Moisture barrier applied 04/16/24 1028   Compression Two layer compression 04/16/24 1028   Off Loading Football dressing;Off loading shoe 04/16/24 1028   Dressing Change Due 04/19/24 04/16/24 1028           Plan:     Wound Dressing Orders       Dressing change frequency twice a week (prn nurse visit)   Remove old dressing   Cleanse or irrigate with: Normal Saline     Moisturize dry skin on leg with Enlivaderm     Protect periwound with:  Gentian Violet hugging wound bed   Primary dressing: WOUND BASE: Meropenem ABX powder to wound bed then Hydrofera Blue CLASSIC (moistened with saline then cut to size) and Drawtex (cut to size of wound, 2 layers)  Secondary dressing: Custom Felt pad with large hole cut to allow for drainage to Mextra (5" x 5"), 1 layer of cast padding then Mepilex (non-border foam)   Compression/Off-load: Football (cast padding x 2 rolls, 1/2 roll fan folded to plantar for cushion). UrgoK2, size Large, 40 mmHg (ankle size 25 cm) and CAM Boot on the affected foot          Follow up in about 3 days (around 4/19/2024) for Wound Care.        "

## 2024-04-19 ENCOUNTER — HOSPITAL ENCOUNTER (OUTPATIENT)
Dept: WOUND CARE | Facility: HOSPITAL | Age: 54
Discharge: HOME | End: 2024-04-19
Attending: PODIATRIST
Payer: MEDICARE

## 2024-04-19 VITALS
DIASTOLIC BLOOD PRESSURE: 82 MMHG | BODY MASS INDEX: 29.16 KG/M2 | HEIGHT: 73 IN | HEART RATE: 94 BPM | WEIGHT: 220 LBS | SYSTOLIC BLOOD PRESSURE: 135 MMHG | TEMPERATURE: 97 F

## 2024-04-19 DIAGNOSIS — L97.426 DIABETIC ULCER OF LEFT MIDFOOT ASSOCIATED WITH TYPE 2 DIABETES MELLITUS, WITH BONE INVOLVEMENT WITHOUT EVIDENCE OF NECROSIS: Primary | ICD-10-CM

## 2024-04-19 DIAGNOSIS — E11.621 DIABETIC ULCER OF LEFT MIDFOOT ASSOCIATED WITH TYPE 2 DIABETES MELLITUS, WITH BONE INVOLVEMENT WITHOUT EVIDENCE OF NECROSIS: Primary | ICD-10-CM

## 2024-04-19 DIAGNOSIS — E11.49 TYPE II DIABETES MELLITUS WITH NEUROLOGICAL MANIFESTATIONS: ICD-10-CM

## 2024-04-19 PROCEDURE — 11042 DBRDMT SUBQ TIS 1ST 20SQCM/<: CPT | Mod: ,,, | Performed by: PODIATRIST

## 2024-04-19 PROCEDURE — 11042 DBRDMT SUBQ TIS 1ST 20SQCM/<: CPT | Performed by: PODIATRIST

## 2024-04-19 PROCEDURE — 99499 UNLISTED E&M SERVICE: CPT | Mod: ,,, | Performed by: PODIATRIST

## 2024-04-19 NOTE — PROGRESS NOTES
[facility]                Progress Note    Subjective:       Patient ID: Indio Ryder Jr. is a 53 y.o. male.    Chief Complaint: Wound Check, Dressing Change, and Diabetic Foot Ulcer    Follow up wound care visit. Patient ambulated to exam room using knee scooter to off-load left foot with CAM boot on LLE and diabetic tennis shoe w/lift on right foot. He denies fever, chills, nausea, vomiting or diarrhea. No c/o pain or discomfort to wound bed at present. Dressing is intact to LLE, with no strike through drainage. Patient reports continuing to take oral Amoxicillin prescribed by Infectious Disease.     Prior to application, patient's ankle circumference measures 25 cm and has current ALISSA of 1.13, which correlates with UrgoK2, Large, 40 mmHg          Review of Systems   Constitutional:  Negative for appetite change, chills, fatigue and fever.   HENT:  Negative for hearing loss.    Eyes:  Negative for photophobia and visual disturbance.   Respiratory:  Negative for cough, chest tightness, shortness of breath and wheezing.    Cardiovascular:  Positive for leg swelling. Negative for chest pain and palpitations.   Gastrointestinal:  Negative for constipation, diarrhea, nausea and vomiting.   Endocrine: Negative for cold intolerance and heat intolerance.   Genitourinary:  Negative for flank pain.   Musculoskeletal:  Positive for gait problem and myalgias. Negative for neck pain and neck stiffness.   Skin:  Positive for wound. Negative for color change.   Neurological:  Positive for numbness. Negative for weakness, light-headedness and headaches.        + paresthesia    Psychiatric/Behavioral:  Negative for sleep disturbance.          Objective:        Physical Exam  Constitutional:       Appearance: He is well-developed.   Cardiovascular:      Comments: Dorsalis pedis and posterior tibial pulses are palpable Skin is atrophic, slightly hyperpigmented, and mildly  edematous      Musculoskeletal:         General: No tenderness. Normal range of motion.      Comments: Muscle strength is 5/5 in all groups bilaterally.    S/p partial 5th ray amp b/l    S/p hallux amp right    Fat pad atrophy to heels and met heads bilateral       Skin:     General: Skin is warm and dry.      Coloration: Skin is not jaundiced, mottled or sallow.      Findings: Wound (see below) present. No abscess or ecchymosis.      Comments:        Neurological:      Mental Status: He is alert and oriented to person, place, and time.      Comments: [place]-Nenita 5.07 monofilamant testing is diminished Kenji feet. Sharp/dull sensation diminished Bilaterally. Light touch absent Bilaterally.       Psychiatric:         Behavior: Behavior normal.         Vitals:    04/19/24 0817   BP: 135/82   Pulse: 94   Temp: 97.2 °F (36.2 °C)       Assessment:           ICD-10-CM ICD-9-CM   1. Diabetic ulcer of left midfoot associated with type 2 diabetes mellitus, with bone involvement without evidence of necrosis  E11.621 250.80    L97.426 707.14   2. Type II diabetes mellitus with neurological manifestations  E11.49 250.60            (Retired) Wound 04/08/22 1137 Diabetic Ulcer Left plantar;lateral Foot (Active)   04/08/22 1137    Pre-existing: Yes   Primary Wound Type: Diabetic ulc   Side: Left   Orientation: plantar;lateral   Location: Foot   Wound Number:    Ankle-Brachial Index:    Pulses:    Removal Indication and Assessment:    Wound Outcome:    (Retired) Wound Type:    (Retired) Wound Length (cm):    (Retired) Wound Width (cm):    (Retired) Depth (cm):    Wound Description (Comments):    Removal Indications:    Wound Image    04/19/24 1251   Wound WDL ex 04/19/24 1251   Dressing Appearance Intact;Moist drainage 04/19/24 1251   Drainage Amount Large 04/19/24 1251   Drainage Characteristics/Odor Serosanguineous 04/19/24 1251   Appearance Red;Moist 04/19/24 1251   Tissue loss description Full thickness 04/19/24 1251   Black  (%), Wound Tissue Color 0 % 04/19/24 1251   Red (%), Wound Tissue Color 100 % 04/19/24 1251   Yellow (%), Wound Tissue Color 0 % 04/19/24 1251   Periwound Area Pale white;Moist;Macerated 04/19/24 1251   Wound Edges Defined;Open 04/19/24 1251   Wound Length (cm) 6.9 cm 04/19/24 1251   Wound Width (cm) 2 cm 04/19/24 1251   Wound Depth (cm) 0.2 cm 04/19/24 1251   Wound Volume (cm^3) 2.76 cm^3 04/19/24 1251   Wound Surface Area (cm^2) 13.8 cm^2 04/19/24 1251   Tunneling (depth (cm)/location) 0 04/19/24 1251   Undermining (depth (cm)/location) 0 04/19/24 1251   Care Cleansed with:;Antimicrobial agent;Sterile normal saline;Wound cleanser;Debrided 04/19/24 1251   Dressing Applied;Methylene blue/gentian violet;Hydrofiber;Absorptive Pad;Foam;Cast padding;Compression wrap 04/19/24 1251   Periwound Care Moisture barrier applied;Absorptive dressing applied 04/19/24 1251   Compression Two layer compression 04/19/24 1251   Off Loading Football dressing;Off loading shoe 04/19/24 1251   Dressing Change Due 04/24/24 04/19/24 1251           Plan:                Education about the prevention of limb loss.    Discussed wound healing cycle, skin integrity, ways to care for skin.Counseled patient on the effects of biomechanical pressure,  PVD, and blood glucose on healing. He verbalizes understanding that it can increase the chances of delayed healing and this prolonged exposure leads to infection or progression of infection which subsequently can result in loss of limb.      Wound Debridement    Date/Time: 4/19/2024 8:00 AM    Performed by: Marivel Peterson DPM  Authorized by: Marivel Peterson DPM      Wound Details:    Location:  Left foot    Location:  Left Midfoot    Type of Debridement:  Excisional       Length (cm):  6.9       Area (sq cm):  13.8       Width (cm):  2       Percent Debrided (%):  100       Depth (cm):  0.2       Total Area Debrided (sq cm):  13.8    Depth of debridement:  Subcutaneous tissue    Tissue debrided:   "Dermis, Epidermis and Subcutaneous    Devitalized tissue debrided:  Callus and Fibrin    Instruments:  Curette  Bleeding:  Minimal  Hemostasis Achieved: Yes  Method Used:  Pressure  Patient tolerance:  Patient tolerated the procedure well with no immediate complications      The site is cleansed of foreign material as much as possible, stagnant    Patient would benefit from skin substitute, plans for application pending insurance approval.     Tissue pathology and/or culture taken     [] Yes      [x] No  Sharp debridement performed                   [x] Yes       [] No  Labs ordered     [] Yes       [x] No  Imaging ordered    [] Yes      [x] No    Orders Placed This Encounter   Procedures    Debridement     This order was created via procedure documentation     Standing Status:   Standing     Number of Occurrences:   1    Skin Substitute Authorization     Order Specific Question:   What substitution needs authrorization?     Answer:   Epifix     Change dressing     Wound Dressing Orders       Dressing change frequency twice a week (prn nurse visit)   Remove old dressing   Cleanse or irrigate with: Normal Saline     Moisturize dry skin on leg with Enlivaderm     Protect periwound with:  Gentian Violet hugging wound bed   Primary dressing: WOUND BASE: Meropenem ABX powder to wound bed then Hydrofera Blue CLASSIC (moistened with saline then cut to size) and Drawtex (cut to size of wound, 2 layers)  Secondary dressing: Custom Felt pad with large hole cut to allow for drainage to Mextra (5" x 5"), 1 layer of cast padding then Mepilex (non-border foam)   Compression/Off-load: Football (cast padding x 2 rolls, 1/2 roll fan folded to plantar for cushion). UrgoK2, size Large, 40 mmHg (ankle size 25 cm) and CAM Boot on the affected foot     Change at Nurse Visit.        Follow up in about 5 days (around 4/24/2024) for Nurse Visit.       "

## 2024-04-20 ENCOUNTER — LAB VISIT (OUTPATIENT)
Dept: LAB | Facility: HOSPITAL | Age: 54
End: 2024-04-20
Payer: MEDICARE

## 2024-04-20 DIAGNOSIS — Z79.4 TYPE 2 DIABETES MELLITUS WITH HYPERGLYCEMIA, WITH LONG-TERM CURRENT USE OF INSULIN: ICD-10-CM

## 2024-04-20 DIAGNOSIS — E11.65 TYPE 2 DIABETES MELLITUS WITH HYPERGLYCEMIA, WITH LONG-TERM CURRENT USE OF INSULIN: ICD-10-CM

## 2024-04-20 LAB
ESTIMATED AVG GLUCOSE: ABNORMAL MG/DL (ref 68–131)
HBA1C MFR BLD: >14 % (ref 4–5.6)

## 2024-04-20 PROCEDURE — 36415 COLL VENOUS BLD VENIPUNCTURE: CPT | Performed by: NURSE PRACTITIONER

## 2024-04-20 PROCEDURE — 83036 HEMOGLOBIN GLYCOSYLATED A1C: CPT | Performed by: NURSE PRACTITIONER

## 2024-04-24 ENCOUNTER — HOSPITAL ENCOUNTER (OUTPATIENT)
Dept: WOUND CARE | Facility: HOSPITAL | Age: 54
Discharge: HOME | End: 2024-04-24
Attending: PODIATRIST
Payer: MEDICARE

## 2024-04-24 VITALS — SYSTOLIC BLOOD PRESSURE: 128 MMHG | HEART RATE: 102 BPM | DIASTOLIC BLOOD PRESSURE: 74 MMHG | TEMPERATURE: 98 F

## 2024-04-24 DIAGNOSIS — L97.426 DIABETIC ULCER OF LEFT MIDFOOT ASSOCIATED WITH TYPE 2 DIABETES MELLITUS, WITH BONE INVOLVEMENT WITHOUT EVIDENCE OF NECROSIS: Primary | ICD-10-CM

## 2024-04-24 DIAGNOSIS — E11.49 TYPE II DIABETES MELLITUS WITH NEUROLOGICAL MANIFESTATIONS: ICD-10-CM

## 2024-04-24 DIAGNOSIS — E11.621 DIABETIC ULCER OF LEFT MIDFOOT ASSOCIATED WITH TYPE 2 DIABETES MELLITUS, WITH BONE INVOLVEMENT WITHOUT EVIDENCE OF NECROSIS: Primary | ICD-10-CM

## 2024-04-24 PROCEDURE — 29581 APPL MULTLAYER CMPRN SYS LEG: CPT

## 2024-04-24 NOTE — PROGRESS NOTES
[facility]  [address]  [address]  Nurse Visit    Subjective:       Patient seen in clinic today. Patient ambulated to exam room using knee scooter to off-load left foot with CAM boot on LLE and diabetic tennis shoe w/lift on right foot. He denies fever, chills, nausea, vomiting or diarrhea. No c/o pain or discomfort to wound bed at present. Dressing is intact to LLE, with no strike through drainage. Prior to application, patient's ankle circumference measures 25 cm and has current ALISSA of 1.13, which correlates with UrgoK2, Large, 40 mmHg. Dressing changed as ordered.      Assessment:          (Retired) Wound 04/08/22 1137 Diabetic Ulcer Left plantar;lateral Foot (Active)   04/08/22 1137    Pre-existing: Yes   Primary Wound Type: Diabetic ulc   Side: Left   Orientation: plantar;lateral   Location: Foot   Wound Number:    Ankle-Brachial Index:    Pulses:    Removal Indication and Assessment:    Wound Outcome:    (Retired) Wound Type:    (Retired) Wound Length (cm):    (Retired) Wound Width (cm):    (Retired) Depth (cm):    Wound Description (Comments):    Removal Indications:    Wound Image   04/24/24 1504   Wound WDL ex 04/24/24 1504   Dressing Appearance Intact;Moist drainage 04/24/24 1504   Drainage Amount Moderate 04/24/24 1504   Drainage Characteristics/Odor Serosanguineous 04/24/24 1504   Appearance Red;White 04/24/24 1504   Tissue loss description Full thickness 04/24/24 1504   Black (%), Wound Tissue Color 0 % 04/24/24 1504   Red (%), Wound Tissue Color 95 % 04/24/24 1504   Yellow (%), Wound Tissue Color 5 % 04/24/24 1504   Periwound Area Pale white;Macerated 04/24/24 1504   Wound Edges Defined;Open 04/24/24 1504   Care Cleansed with:;Antimicrobial agent;Sterile normal saline 04/24/24 1504   Dressing Applied;Methylene blue/gentian violet;Hydrofiber;Absorptive Pad;Cast padding;Compression wrap 04/24/24 1504   Periwound Care Moisture barrier applied 04/24/24 1504  "  Compression Two layer compression 04/24/24 1504   Off Loading Football dressing;Off loading shoe 04/24/24 1504   Dressing Change Due 04/26/24 04/24/24 1504           Plan:     Wound Dressing Orders       Dressing change frequency twice a week (prn nurse visit)   Remove old dressing   Cleanse or irrigate with: Normal Saline     Moisturize dry skin on leg with Enlivaderm     Protect periwound with:  Gentian Violet hugging wound bed   Primary dressing: WOUND BASE: Meropenem ABX powder to wound bed then Hydrofera Blue CLASSIC (moistened with saline then cut to size) and Drawtex (cut to size of wound, 2 layers)   Secondary dressing: Custom Felt pad with large hole cut to allow for drainage to Mextra (5" x 5"), 1 layer of cast padding then Mepilex (non-border foam)   Compression/Off-load: Football (cast padding x 2 rolls, 1/2 roll fan folded to plantar for cushion). UrgoK2, size Large, 40 mmHg (ankle size 25 cm) and CAM Boot on the affected foot           Follow up in about 2 days (around 4/26/2024) for Wound Care.          "

## 2024-04-25 NOTE — PROGRESS NOTES
[facility]                Progress Note    Subjective:       Patient ID: Indio Ryder Jr. is a 52 y.o. male.    Chief Complaint: Wound Care    Follow up wound care visit. Patient ambulated to exam room with own tennis shoe with lift on Right Foot and CAM boot on LLE. No c/o pain at present. Denies fever, chills or flu-like symptoms. Dressing to Left Foot is intact with a moderate amount of yellow, serosanguineous, non-malodor drainage.     Review of Systems   Constitutional:  Negative for appetite change, chills, fatigue and fever.   HENT:  Negative for hearing loss.    Eyes:  Negative for photophobia and visual disturbance.   Respiratory:  Negative for cough, chest tightness, shortness of breath and wheezing.    Cardiovascular:  Positive for leg swelling. Negative for chest pain and palpitations.   Gastrointestinal:  Negative for constipation, diarrhea, nausea and vomiting.   Endocrine: Negative for cold intolerance and heat intolerance.   Genitourinary:  Negative for flank pain.   Musculoskeletal:  Positive for gait problem and myalgias. Negative for neck pain and neck stiffness.   Skin:  Positive for wound. Negative for color change.   Neurological:  Positive for numbness. Negative for weakness, light-headedness and headaches.        + paresthesia    Psychiatric/Behavioral:  Negative for sleep disturbance.        Objective:        Physical Exam  Constitutional:       Appearance: He is well-developed.   Cardiovascular:      Comments: Dorsalis pedis and posterior tibial pulses are palpable Skin is atrophic, slightly hyperpigmented, and mildly edematous      Musculoskeletal:         General: No tenderness. Normal range of motion.      Comments: Muscle strength is 5/5 in all groups bilaterally.    S/p partial 5th ray amp b/l    S/p hallux amp right    Fat pad atrophy to heels and met heads bilateral       Skin:     General: Skin is warm and dry.      Coloration:  Skin is not jaundiced, mottled or sallow.      Findings: Wound (see below) present. No abscess or ecchymosis.      Comments:        Neurological:      Mental Status: He is alert and oriented to person, place, and time.      Comments: Dante-Nenita 5.07 monofilamant testing is diminished Kenji feet. Sharp/dull sensation diminished Bilaterally. Light touch absent Bilaterally.       Psychiatric:         Behavior: Behavior normal.       Vitals:    06/16/23 1103   BP: (!) 159/81   Pulse: 86   Temp: 97.1 °F (36.2 °C)       Assessment:           ICD-10-CM ICD-9-CM   1. Diabetic ulcer of right foot associated with type 2 diabetes mellitus, with fat layer exposed  E11.621 250.80    L97.512 707.15   2. Diabetic ulcer of left midfoot associated with type 2 diabetes mellitus, with fat layer exposed  E11.621 250.80    L97.422 707.14            Altered Skin Integrity 05/05/23 1108 Left distal;lateral;plantar Foot Blister(s) (Active)   05/05/23 1108   Altered Skin Integrity Present on Admission - Did Patient arrive to the hospital with altered skin?: yes   Side: Left   Orientation: distal;lateral;plantar   Location: Foot   Wound Number:    Is this injury device related?: No   Primary Wound Type: Blister(s)   Description of Altered Skin Integrity:    Ankle-Brachial Index:    Pulses:    Removal Indication and Assessment:    Wound Outcome:    (Retired) Wound Length (cm):    (Retired) Wound Width (cm):    (Retired) Depth (cm):    Wound Description (Comments):    Removal Indications:    Wound Image   06/16/23 1143   Description of Altered Skin Integrity Partial thickness tissue loss. Shallow open ulcer with a red or pink wound bed, without slough. Intact or Open/Ruptured Serum-filled blister. 06/16/23 1143   Dressing Appearance Intact;Moist drainage 06/16/23 1143   Drainage Amount Moderate 06/16/23 1143   Drainage Characteristics/Odor Yellow;No odor 06/16/23 1143   Appearance Pink;Moist 06/16/23 1143   Tissue loss description Partial  thickness 06/16/23 1143   Black (%), Wound Tissue Color 0 % 06/16/23 1143   Red (%), Wound Tissue Color 100 % 06/16/23 1143   Yellow (%), Wound Tissue Color 0 % 06/16/23 1143   Periwound Area Pale white 06/16/23 1143   Wound Edges Defined;Open 06/16/23 1143   Wound Length (cm) 0.4 cm 06/16/23 1143   Wound Width (cm) 0.3 cm 06/16/23 1143   Wound Depth (cm) 0.1 cm 06/16/23 1143   Wound Volume (cm^3) 0.012 cm^3 06/16/23 1143   Wound Surface Area (cm^2) 0.12 cm^2 06/16/23 1143   Tunneling (depth (cm)/location) 0 06/16/23 1143   Undermining (depth (cm)/location) 0 06/16/23 1143   Care Cleansed with:;Antimicrobial agent;Sterile normal saline 06/16/23 1143   Dressing Changed 06/16/23 1143   Periwound Care Moisture barrier applied 06/16/23 1143   Compression Tubular elasticized bandage 06/16/23 1143   Off Loading Football dressing;Off loading shoe 06/16/23 1143   Dressing Change Due 06/23/23 06/16/23 1143            (Retired) Wound 04/08/22 1137 Diabetic Ulcer Left plantar;lateral Foot (Active)   04/08/22 1137    Pre-existing: Yes   Primary Wound Type: Diabetic ulc   Side: Left   Orientation: plantar;lateral   Location: Foot   Wound Number:    Ankle-Brachial Index:    Pulses:    Removal Indication and Assessment:    Wound Outcome:    (Retired) Wound Type:    (Retired) Wound Length (cm):    (Retired) Wound Width (cm):    (Retired) Depth (cm):    Wound Description (Comments):    Removal Indications:    Wound Image   06/16/23 1143   Wound WDL ex 06/16/23 1143   Dressing Appearance Intact;Moist drainage 06/16/23 1143   Drainage Amount Moderate 06/16/23 1143   Drainage Characteristics/Odor Yellow;Serosanguineous;No odor 06/16/23 1143   Appearance Red;Moist 06/16/23 1143   Tissue loss description Partial thickness 06/16/23 1143   Black (%), Wound Tissue Color 0 % 06/16/23 1143   Red (%), Wound Tissue Color 100 % 06/16/23 1143   Yellow (%), Wound Tissue Color 0 % 06/16/23 1143   Periwound Area Pale white 06/16/23 1143   Wound  Edges Defined;Open 06/16/23 1143   Wound Length (cm) 1.4 cm 06/16/23 1143   Wound Width (cm) 1.2 cm 06/16/23 1143   Wound Depth (cm) 0.1 cm 06/16/23 1143   Wound Volume (cm^3) 0.168 cm^3 06/16/23 1143   Wound Surface Area (cm^2) 1.68 cm^2 06/16/23 1143   Tunneling (depth (cm)/location) 0 06/16/23 1143   Undermining (depth (cm)/location) 0 06/16/23 1143   Care Cleansed with:;Antimicrobial agent;Sterile normal saline 06/16/23 1143   Dressing Changed 06/16/23 1143   Periwound Care Moisture barrier applied 06/16/23 1143   Compression Tubular elasticized bandage 06/16/23 1143   Off Loading Football dressing;Off loading shoe 06/16/23 1143   Dressing Change Due 06/23/23 06/16/23 1143           Plan:                Education about the prevention of limb loss.    Discussed wound healing cycle, skin integrity, ways to care for skin.Counseled patient on the effects of biomechanical pressure, PVD and blood glucose on healing. He verbalizes understanding that it can increase the chances of delayed healing and this prolonged exposure leads to infection or progression of infection which subsequently can result in loss of limb.    Both wound beds are measuring smaller.     Wound care done as per order. Return to clinic  for a Nurse Visit and then 1 week to see MD. Patient declined AVS.    I custom cut and personally applied an offloading pad to be applied to the foot due to deformity, body habitus, activity level    Return to clinic in one week. Will call pt if any cancellations due to unavailability of nurse visit. Refused printed AVS    Short-term goals include maintaining good offloading and minimizing bioburden, promoting granulation and epithelialization to healing.  Long-term goals include keeping the wound healed by good offloading and medical management under the direction of internist.    Wound Debridement    Date/Time: 6/16/2023 10:50 AM  Performed by: Marivel Peterson DPM  Authorized by: Marivel Peterson DPM       Wound  "Details:    Location:  Left foot    Location:  Left Midfoot    Type of Debridement:  Excisional       Length (cm):  1.4       Area (sq cm):  1.68       Width (cm):  1.2       Percent Debrided (%):  100       Depth (cm):  0.1       Total Area Debrided (sq cm):  1.68    Depth of debridement:  Subcutaneous tissue    Tissue debrided:  Epidermis, Subcutaneous and Dermis    Devitalized tissue debrided:  Fibrin and Callus    Instruments:  Curette  Bleeding:  Minimal  Hemostasis Achieved: Yes  Method Used:  Pressure  Patient tolerance:  Patient tolerated the procedure well with no immediate complications      Tissue pathology and/or culture taken     [] Yes      [x] No  Sharp debridement performed                   [x] Yes       [] No  Labs ordered     [] Yes       [x] No  Imaging ordered    [] Yes      [x] No    Orders Placed This Encounter   Procedures    Debridement     This order was created via procedure documentation     Standing Status:   Standing     Number of Occurrences:   1    Change dressing     Wound Care Order    Remove old dressing   Cleanse or irrigate with: Normal Saline   Left Foot  Protect periwound with:  Gentian Violet hugging wound bed, Cavilon/Benzoin to Plantar Foot where Custom Pad will be placed   Primary dressing: Cellerate Rx (per MD) to wound bed covered by UrgoTul Ag then Melgisorb AG, Custom Felt Pad (with large holes cut to allow for drainage)   Secondary dressing: Drawtex within holes then Mextra (long 5" x 9"). Abram foam (long x2, laid perpendicular).   Compression and Off-load: Football dressing (cast padding x 3). Tubigrip single layer, size E folded over foot like sock and secured with Medipore Tape. CAM boot.    RLE: Cavilon then U-pad to plantar around bony prominence. Tubigrip single layer, size E    Change at Nurse Visit        Follow up in about 5 days (around 6/21/2023) for wound care.       " MODERATE

## 2024-04-26 ENCOUNTER — HOSPITAL ENCOUNTER (OUTPATIENT)
Dept: WOUND CARE | Facility: HOSPITAL | Age: 54
Discharge: HOME | End: 2024-04-26
Attending: PODIATRIST
Payer: MEDICARE

## 2024-04-26 VITALS — DIASTOLIC BLOOD PRESSURE: 92 MMHG | TEMPERATURE: 97 F | HEART RATE: 94 BPM | SYSTOLIC BLOOD PRESSURE: 155 MMHG

## 2024-04-26 DIAGNOSIS — E11.49 TYPE II DIABETES MELLITUS WITH NEUROLOGICAL MANIFESTATIONS: ICD-10-CM

## 2024-04-26 DIAGNOSIS — E11.621 DIABETIC ULCER OF LEFT MIDFOOT ASSOCIATED WITH TYPE 2 DIABETES MELLITUS, WITH BONE INVOLVEMENT WITHOUT EVIDENCE OF NECROSIS: Primary | ICD-10-CM

## 2024-04-26 DIAGNOSIS — L97.426 DIABETIC ULCER OF LEFT MIDFOOT ASSOCIATED WITH TYPE 2 DIABETES MELLITUS, WITH BONE INVOLVEMENT WITHOUT EVIDENCE OF NECROSIS: Primary | ICD-10-CM

## 2024-04-26 PROCEDURE — 11043 DBRDMT MUSC&/FSCA 1ST 20/<: CPT | Performed by: PODIATRIST

## 2024-04-26 PROCEDURE — 99499 UNLISTED E&M SERVICE: CPT | Mod: ,,, | Performed by: PODIATRIST

## 2024-04-26 PROCEDURE — 11042 DBRDMT SUBQ TIS 1ST 20SQCM/<: CPT | Performed by: PODIATRIST

## 2024-04-26 PROCEDURE — 11043 DBRDMT MUSC&/FSCA 1ST 20/<: CPT | Mod: ,,, | Performed by: PODIATRIST

## 2024-04-26 NOTE — PROGRESS NOTES
[facility]                Progress Note    Subjective:       Patient ID: Indio Ryder Jr. is a 53 y.o. male.    Chief Complaint: Dressing Change, Diabetic Foot Ulcer, and Wound Care    Follow up wound care visit. Patient ambulated to exam room using knee scooter to off-load left foot with CAM boot on LLE and diabetic tennis shoe w/lift on right foot. He denies fever, chills, nausea, vomiting or diarrhea. No c/o pain or discomfort to wound bed at present. Dressing is intact to LLE, with strike through drainage. Patient reports continuing to take oral Amoxicillin prescribed by Infectious Disease. Left Plantar Foot wound bed has exposed tendon to proximal aspect of wound bed. Patient admits to walking around more over the weekend while shopping.         Review of Systems   Constitutional:  Negative for appetite change, chills, fatigue and fever.   HENT:  Negative for hearing loss.    Eyes:  Negative for photophobia and visual disturbance.   Respiratory:  Negative for cough, chest tightness, shortness of breath and wheezing.    Cardiovascular:  Positive for leg swelling. Negative for chest pain and palpitations.   Gastrointestinal:  Negative for constipation, diarrhea, nausea and vomiting.   Endocrine: Negative for cold intolerance and heat intolerance.   Genitourinary:  Negative for flank pain.   Musculoskeletal:  Positive for gait problem and myalgias. Negative for neck pain and neck stiffness.   Skin:  Positive for wound. Negative for color change.   Neurological:  Positive for numbness. Negative for weakness, light-headedness and headaches.        + paresthesia    Psychiatric/Behavioral:  Negative for sleep disturbance.          Objective:        Physical Exam  Constitutional:       Appearance: He is well-developed.   Cardiovascular:      Comments: Dorsalis pedis and posterior tibial pulses are palpable Skin is atrophic, slightly hyperpigmented, and mildly  edematous      Musculoskeletal:         General: No tenderness. Normal range of motion.      Comments: Muscle strength is 5/5 in all groups bilaterally.    S/p partial 5th ray amp b/l    S/p hallux amp right    Fat pad atrophy to heels and met heads bilateral       Skin:     General: Skin is warm and dry.      Coloration: Skin is not jaundiced, mottled or sallow.      Findings: Wound (see below) present. No abscess or ecchymosis.      Comments:        Neurological:      Mental Status: He is alert and oriented to person, place, and time.      Comments: Coram-Nenita 5.07 monofilamant testing is diminished Kenji feet. Sharp/dull sensation diminished Bilaterally. Light touch absent Bilaterally.       Psychiatric:         Behavior: Behavior normal.         Vitals:    04/26/24 0720   BP: (!) 155/92   Pulse: 94   Temp: 97.3 °F (36.3 °C)       Assessment:           ICD-10-CM ICD-9-CM   1. Diabetic ulcer of left midfoot associated with type 2 diabetes mellitus, with bone involvement without evidence of necrosis  E11.621 250.80    L97.426 707.14   2. Type II diabetes mellitus with neurological manifestations  E11.49 250.60            (Retired) Wound 04/08/22 1137 Diabetic Ulcer Left plantar;lateral Foot (Active)   04/08/22 1137    Pre-existing: Yes   Primary Wound Type: Diabetic ulc   Side: Left   Orientation: plantar;lateral   Location: Foot   Wound Number:    Ankle-Brachial Index:    Pulses:    Removal Indication and Assessment:    Wound Outcome:    (Retired) Wound Type:    (Retired) Wound Length (cm):    (Retired) Wound Width (cm):    (Retired) Depth (cm):    Wound Description (Comments):    Removal Indications:    Wound Image   04/26/24 1558   Wound WDL ex 04/26/24 1558   Dressing Appearance Intact;Moist drainage;Area marked 04/26/24 1558   Drainage Amount Large 04/26/24 1558   Drainage Characteristics/Odor Serosanguineous 04/26/24 1558   Appearance Pink;Red;Moist;Tendon 04/26/24 1558   Tissue loss description Full  thickness 04/26/24 1558   Black (%), Wound Tissue Color 0 % 04/26/24 1558   Red (%), Wound Tissue Color 98 % 04/26/24 1558   Yellow (%), Wound Tissue Color 2 % 04/26/24 1558   Periwound Area Pale white;Moist;Macerated 04/26/24 1558   Wound Edges Callused;Defined;Open 04/26/24 1558   Wound Length (cm) 7 cm 04/26/24 1558   Wound Width (cm) 2.6 cm 04/26/24 1558   Wound Depth (cm) 0.3 cm 04/26/24 1558   Wound Volume (cm^3) 5.46 cm^3 04/26/24 1558   Wound Surface Area (cm^2) 18.2 cm^2 04/26/24 1558   Tunneling (depth (cm)/location) . 04/26/24 1558   Undermining (depth (cm)/location) . 04/26/24 1558   Care Cleansed with:;Antimicrobial agent;Sterile normal saline;Wound cleanser;Debrided 04/26/24 1558   Dressing Changed 04/26/24 1558   Periwound Care Moisture barrier applied 04/26/24 1558   Compression Two layer compression 04/26/24 1558   Off Loading Football dressing;Off loading shoe 04/26/24 1558   Dressing Change Due 04/30/24 04/26/24 1558           Plan:                Education about the prevention of limb loss.    Discussed wound healing cycle, skin integrity, ways to care for skin.Counseled patient on the effects of biomechanical pressure,  PVD, and blood glucose on healing. He verbalizes understanding that it can increase the chances of delayed healing and this prolonged exposure leads to infection or progression of infection which subsequently can result in loss of limb.      Wound Debridement    Date/Time: 4/26/2024 6:58 AM    Performed by: Marivel Peterson DPM  Authorized by: Marivel Peterson DPM      Wound Details:    Location:  Left foot    Location:  Left Midfoot    Type of Debridement:  Excisional       Length (cm):  7       Area (sq cm):  18.2       Width (cm):  2.6       Percent Debrided (%):  100       Depth (cm):  0.3       Total Area Debrided (sq cm):  18.2    Depth of debridement:  Muscle/fascia/tendon    Tissue debrided:  Fascia, Subcutaneous, Dermis and Epidermis    Devitalized tissue debrided:   Callus, Fibrin and Biofilm    Instruments:  Curette  Bleeding:  Minimal  Hemostasis Achieved: Yes  Method Used:  Pressure  Patient tolerance:  Patient tolerated the procedure well with no immediate complications      The site is cleansed of foreign material as much as possible, stagnant    Patient would benefit from skin substitute, plans for application pending insurance approval.     Prior to application, patient's ankle circumference measures 26 cm and has current ALISSA of 1.13, which correlates with UrgoK2, Large, 40 mmHg    Tissue pathology and/or culture taken     [] Yes      [x] No  Sharp debridement performed                   [x] Yes       [] No  Labs ordered     [] Yes       [x] No  Imaging ordered    [] Yes      [x] No    Orders Placed This Encounter   Procedures    Change dressing     Wound Dressing Orders       Dressing change frequency twice a week (prn nurse visit)   Remove old dressing   Cleanse or irrigate with: Normal Saline     Moisturize dry skin on leg with Enlivaderm     Protect periwound with:  Gentian Violet hugging wound bed   Primary dressing: WOUND BASE: [place] to wound bed then Silvercel (2 layers, cut to fit custom felt pad)   Secondary dressing: Custom Felt pad with large hole cut to allow for drainage. Mepilex Ag then Mextra    Compression/Off-load: Football (cast padding x 2 rolls, 1/2 roll fan folded to plantar for cushion). UrgoK2, size Large, 40 mmHg (ankle size 26 cm) and CAM Boot on the affected foot     Change at Nurse Visit.        Follow up in about 4 days (around 4/30/2024) for Nurse Visit.

## 2024-04-30 ENCOUNTER — HOSPITAL ENCOUNTER (OUTPATIENT)
Dept: WOUND CARE | Facility: HOSPITAL | Age: 54
Discharge: HOME | End: 2024-04-30
Attending: PODIATRIST
Payer: MEDICARE

## 2024-04-30 VITALS — TEMPERATURE: 98 F | SYSTOLIC BLOOD PRESSURE: 112 MMHG | DIASTOLIC BLOOD PRESSURE: 77 MMHG | HEART RATE: 97 BPM

## 2024-04-30 DIAGNOSIS — L97.426 DIABETIC ULCER OF LEFT MIDFOOT ASSOCIATED WITH TYPE 2 DIABETES MELLITUS, WITH BONE INVOLVEMENT WITHOUT EVIDENCE OF NECROSIS: Primary | ICD-10-CM

## 2024-04-30 DIAGNOSIS — E11.621 DIABETIC ULCER OF LEFT MIDFOOT ASSOCIATED WITH TYPE 2 DIABETES MELLITUS, WITH BONE INVOLVEMENT WITHOUT EVIDENCE OF NECROSIS: Primary | ICD-10-CM

## 2024-04-30 PROCEDURE — 29581 APPL MULTLAYER CMPRN SYS LEG: CPT

## 2024-04-30 NOTE — PROGRESS NOTES
[facility]  [address]  [address]  Nurse Visit    Subjective:       Patient seen in clinic today. Patient ambulated to exam room using knee scooter to off-load left foot with CAM boot on LLE and diabetic tennis shoe w/lift on right foot. He denies fever, chills, nausea, vomiting or diarrhea. No c/o pain to wound bed at present. Dressing is intact to LLE, with a large amount strike through drainage. 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 Prior to application, patient's ankle circumference measures 26 cm and has current ALISSA of 1.13, which correlates with UrgoK2, Large, 40 mmHg. Dressing changed as ordered. Patient given new walker boot for Left Foot and applied before leaving, but advised if uncomfortable he can change back to CAM boot (pressure points noted to lateral aspect of Left Foot with no skin broken).      Assessment:          (Retired) Wound 04/08/22 1137 Diabetic Ulcer Left plantar;lateral Foot (Active)   04/08/22 1137    Pre-existing: Yes   Primary Wound Type: Diabetic ulc   Side: Left   Orientation: plantar;lateral   Location: Foot   Wound Number:    Ankle-Brachial Index:    Pulses:    Removal Indication and Assessment:    Wound Outcome:    (Retired) Wound Type:    (Retired) Wound Length (cm):    (Retired) Wound Width (cm):    (Retired) Depth (cm):    Wound Description (Comments):    Removal Indications:    Wound WDL ex 04/30/24 1713   Dressing Appearance Intact;Moist drainage;Area marked 04/30/24 1713   Drainage Amount Large 04/30/24 1713   Drainage Characteristics/Odor Serosanguineous 04/30/24 1713   Appearance Red;Pink;Dry;Tendon 04/30/24 1713   Tissue loss description Full thickness 04/30/24 1713   Black (%), Wound Tissue Color 0 % 04/30/24 1713   Red (%), Wound Tissue Color 98 % 04/30/24 1713   Yellow (%), Wound Tissue Color 2 % 04/30/24 1713   Periwound Area Pale white 04/30/24 1713   Wound Edges  "Defined;Open 04/30/24 1713   Care Cleansed with:;Antimicrobial agent;Sterile normal saline 04/30/24 1713   Dressing Applied;Collagen;Calcium alginate;Hydrofiber;Absorptive Pad;Foam;Cast padding;Compression wrap 04/30/24 1713   Periwound Care Moisture barrier applied 04/30/24 1713   Compression Two layer compression 04/30/24 1713   Off Loading Football dressing;Off loading shoe 04/30/24 1713   Dressing Change Due 05/03/24 04/30/24 1713           Plan:     Wound Dressing Orders       Dressing change frequency twice a week (prn nurse visit)   Remove old dressing   Cleanse or irrigate with: Normal Saline     Moisturize dry skin on leg with Enlivaderm     Protect periwound with:  Gentian Violet hugging wound bed   Primary dressing: WOUND BASE: [place] to wound bed then Silvercel (2 layers, cut to fit custom felt pad)   Secondary dressing: Custom Foam pad with large hole cut to allow for drainage (felt pad n/a, with Drawtex cut to fit edge of hole). Mepilex Ag then Mextra 5" x 9"  Compression/Off-load: Football (cast padding x 2 rolls, 1/2 roll fan folded to plantar for cushion). UrgoK2, size Large, 40 mmHg (ankle size 26 cm) and CAM Boot on the affected foot           Follow up in about 3 days (around 5/3/2024).        "

## 2024-05-03 ENCOUNTER — HOSPITAL ENCOUNTER (OUTPATIENT)
Dept: WOUND CARE | Facility: HOSPITAL | Age: 54
Discharge: HOME | End: 2024-05-03
Attending: PODIATRIST
Payer: MEDICARE

## 2024-05-03 VITALS
DIASTOLIC BLOOD PRESSURE: 95 MMHG | HEART RATE: 89 BPM | RESPIRATION RATE: 16 BRPM | TEMPERATURE: 97 F | SYSTOLIC BLOOD PRESSURE: 159 MMHG

## 2024-05-03 DIAGNOSIS — E11.621 DIABETIC ULCER OF LEFT MIDFOOT ASSOCIATED WITH TYPE 2 DIABETES MELLITUS, WITH BONE INVOLVEMENT WITHOUT EVIDENCE OF NECROSIS: Primary | ICD-10-CM

## 2024-05-03 DIAGNOSIS — L97.426 DIABETIC ULCER OF LEFT MIDFOOT ASSOCIATED WITH TYPE 2 DIABETES MELLITUS, WITH BONE INVOLVEMENT WITHOUT EVIDENCE OF NECROSIS: Primary | ICD-10-CM

## 2024-05-03 PROCEDURE — 99499 UNLISTED E&M SERVICE: CPT | Mod: ,,, | Performed by: PODIATRIST

## 2024-05-03 PROCEDURE — 15271 SKIN SUB GRAFT TRNK/ARM/LEG: CPT | Performed by: PODIATRIST

## 2024-05-03 PROCEDURE — 15275 SKIN SUB GRAFT FACE/NK/HF/G: CPT | Performed by: PODIATRIST

## 2024-05-03 PROCEDURE — 15275 SKIN SUB GRAFT FACE/NK/HF/G: CPT | Mod: ,,, | Performed by: PODIATRIST

## 2024-05-03 NOTE — PROGRESS NOTES
[facility]                Progress Note    Subjective:       Patient ID: Indio Ryder Jr. is a 53 y.o. male.    Chief Complaint: Wound Care and Dressing Change    Follow up wound care visit. Patient ambulated to exam room using knee scooter to off-load left foot with walker boot on left foot and diabetic tennis shoe w/lift on right foot. He denies fever, chills, nausea, vomiting or diarrhea. No c/o pain to wound bed at present. Dressing is intact to LLE, with a small amount of strike through drainage to plantar aspect of foot.      Review of Systems   Constitutional:  Negative for appetite change, chills, fatigue and fever.   HENT:  Negative for hearing loss.    Eyes:  Negative for photophobia and visual disturbance.   Respiratory:  Negative for cough, chest tightness, shortness of breath and wheezing.    Cardiovascular:  Positive for leg swelling. Negative for chest pain and palpitations.   Gastrointestinal:  Negative for constipation, diarrhea, nausea and vomiting.   Endocrine: Negative for cold intolerance and heat intolerance.   Genitourinary:  Negative for flank pain.   Musculoskeletal:  Positive for gait problem and myalgias. Negative for neck pain and neck stiffness.   Skin:  Positive for wound. Negative for color change.   Neurological:  Positive for numbness. Negative for weakness, light-headedness and headaches.        + paresthesia    Psychiatric/Behavioral:  Negative for sleep disturbance.          Objective:        Physical Exam  Constitutional:       Appearance: He is well-developed.   Cardiovascular:      Comments: Dorsalis pedis and posterior tibial pulses are palpable Skin is atrophic, slightly hyperpigmented, and mildly edematous      Musculoskeletal:         General: No tenderness. Normal range of motion.      Comments: Muscle strength is 5/5 in all groups bilaterally.    S/p partial 5th ray amp b/l    S/p hallux amp right    Fat pad atrophy to  heels and met heads bilateral       Skin:     General: Skin is warm and dry.      Coloration: Skin is not jaundiced, mottled or sallow.      Findings: Wound (see below) present. No abscess or ecchymosis.      Comments:        Neurological:      Mental Status: He is alert and oriented to person, place, and time.      Comments: [place]-Nenita 5.07 monofilamant testing is diminished Kenji feet. Sharp/dull sensation diminished Bilaterally. Light touch absent Bilaterally.       Psychiatric:         Behavior: Behavior normal.       Vitals:    05/03/24 0814   BP: (!) 159/95   Pulse: 89   Resp: 16   Temp: 97.3 °F (36.3 °C)       Assessment:           ICD-10-CM ICD-9-CM   1. Diabetic ulcer of left midfoot associated with type 2 diabetes mellitus, with bone involvement without evidence of necrosis  E11.621 250.80    L97.426 707.14            (Retired) Wound 04/08/22 1137 Diabetic Ulcer Left plantar;lateral Foot (Active)   04/08/22 1137    Pre-existing: Yes   Primary Wound Type: Diabetic ulc   Side: Left   Orientation: plantar;lateral   Location: Foot   Wound Number:    Ankle-Brachial Index:    Pulses:    Removal Indication and Assessment:    Wound Outcome:    (Retired) Wound Type:    (Retired) Wound Length (cm):    (Retired) Wound Width (cm):    (Retired) Depth (cm):    Wound Description (Comments):    Removal Indications:    Wound Image    05/03/24 0816   Wound WDL ex 05/03/24 0816   Dressing Appearance Dry;Intact;Moist drainage 05/03/24 0816   Drainage Amount Large 05/03/24 0816   Drainage Characteristics/Odor Serosanguineous 05/03/24 0816   Tissue loss description Full thickness 05/03/24 0816   Black (%), Wound Tissue Color 0 % 05/03/24 0816   Red (%), Wound Tissue Color 100 % 05/03/24 0816   Yellow (%), Wound Tissue Color 0 % 05/03/24 0816   Periwound Area Pale white;Macerated 05/03/24 0816   Wound Edges Defined;Open 05/03/24 0816   Wound Length (cm) 6.4 cm 05/03/24 0816   Wound Width (cm) 2.3 cm 05/03/24 0816   Wound  Depth (cm) 0.3 cm 05/03/24 0816   Wound Volume (cm^3) 4.416 cm^3 05/03/24 0816   Wound Surface Area (cm^2) 14.72 cm^2 05/03/24 0816   Tunneling (depth (cm)/location) 0 05/03/24 0816   Undermining (depth (cm)/location) 0 05/03/24 0816   Care Cleansed with:;Antimicrobial agent;Sterile normal saline;Wound cleanser;Applied:;Povidone iodine;Debrided 05/03/24 0816   Dressing Applied;Non-adherent;Calcium alginate;Absorptive Pad;Foam;Cast padding;Compression wrap 05/03/24 0816   Periwound Care Moisture barrier applied;Skin barrier film applied 05/03/24 0816   Compression Two layer compression 05/03/24 0816   Off Loading Football dressing;Off loading shoe 05/03/24 0816   Dressing Change Due 05/10/24 05/03/24 0816           Plan:                Education about the prevention of limb loss.    Discussed wound healing cycle, skin integrity, ways to care for skin.Counseled patient on the effects of biomechanical pressure,  PVD, and blood glucose on healing. He verbalizes understanding that it can increase the chances of delayed healing and this prolonged exposure leads to infection or progression of infection which subsequently can result in loss of limb.      Skin substitute    Date/Time: 5/3/2024 7:44 AM    Performed by: Marivel Peterson DPM  Authorized by: Marivel Peterson DPM    Consent:     Consent obtained:  Written    Consent given by:  Patient  Pre-procedure details:     Preparation: Patient was prepped and draped in usual sterile fashion    Anesthesia (see MAR for exact dosages):     Anesthesia method:  None (loss of protective sensation)  Procedure details:     Location:  head/hands/feet/digits/genitalia    Total wound surface area (cm^2):  15    Product applied:  Epifix    Product lot #:  [id number]    Product expiration:  11/1/2028    Amount used (cm^2):  9    Amount wasted (cm^2):  0    Secured: Yes      Secured with:  Prolene  Dressing:     Dressing applied:  Adaptic dressing    Wrapped with:  Bulky  dressing  Post-procedure details:     Patient tolerance of procedure:  Tolerated well, no immediate complications      The site is cleansed of foreign material as much as possible, Left Plantar Foot wound is measuring smaller, EpiFix skin sub applied to wound with plan for another application at visit in 1 week. Wound care done as per order.    Tissue pathology and/or culture taken     [] Yes      [x] No  Sharp debridement performed                   [x] Yes       [] No  Labs ordered     [] Yes       [x] No  Imaging ordered    [] Yes      [x] No    Orders Placed This Encounter   Procedures    Change dressing     Wound Dressing Orders       Dressing change frequency twice a week (prn nurse visit)   Remove old dressing   Cleanse or irrigate with: Iodine scrub x 2 minutes     Moisturize dry skin on leg with Enlivaderm     Protect periwound with:  Gentian Violet hugging wound bed, Benzoin   Primary dressing: WOUND BASE: EpiFix sutured to wound bed then Cutimed secured with Steri-Strip by MD  Secondary dressing: Aquacel Extra bolstered to wound bed with Steri-Strip to secure    Compression/Off-load: Football (cast padding x 2 rolls, 1/2 roll fan folded to plantar for cushion). UrgoK2, size Large, 40 mmHg (ankle size 26 cm) and CAM Boot on the affected foot     Change at Nurse Visit. Change up to Cutimed/Steri-Strip layer, unless not adhered then change Cutimed and Steri-Strips.        Follow up in about 1 week (around 5/10/2024) for Wound Care.

## 2024-05-07 ENCOUNTER — HOSPITAL ENCOUNTER (OUTPATIENT)
Dept: WOUND CARE | Facility: HOSPITAL | Age: 54
Discharge: HOME | End: 2024-05-07
Payer: MEDICARE

## 2024-05-07 DIAGNOSIS — L97.426 DIABETIC ULCER OF LEFT MIDFOOT ASSOCIATED WITH TYPE 2 DIABETES MELLITUS, WITH BONE INVOLVEMENT WITHOUT EVIDENCE OF NECROSIS: Primary | ICD-10-CM

## 2024-05-07 DIAGNOSIS — E11.621 DIABETIC ULCER OF LEFT MIDFOOT ASSOCIATED WITH TYPE 2 DIABETES MELLITUS, WITH BONE INVOLVEMENT WITHOUT EVIDENCE OF NECROSIS: Primary | ICD-10-CM

## 2024-05-07 PROCEDURE — 29581 APPL MULTLAYER CMPRN SYS LEG: CPT

## 2024-05-07 NOTE — PROGRESS NOTES
[facility]  [address]  [address]  Nurse Visit    Subjective:       Patient seen in clinic today. Patient ambulated to exam room using knee scooter to off-load left foot with walker boot on left foot and diabetic tennis shoe w/lift on right foot. He denies fever, chills, nausea, vomiting or diarrhea. No c/o pain to wound bed at present. Dressing is intact to LLE, with no strike through drainage noted. Cutimed is intact, Steri-Strips are lifting - changed dressing including new Steri-strips and all layers thereafter as listed in order below. Dressing changed as ordered.      Assessment:          (Retired) Wound 04/08/22 1137 Diabetic Ulcer Left plantar;lateral Foot (Active)   04/08/22 1137    Pre-existing: Yes   Primary Wound Type: Diabetic ulc   Side: Left   Orientation: plantar;lateral   Location: Foot   Wound Number:    Ankle-Brachial Index:    Pulses:    Removal Indication and Assessment:    Wound Outcome:    (Retired) Wound Type:    (Retired) Wound Length (cm):    (Retired) Wound Width (cm):    (Retired) Depth (cm):    Wound Description (Comments):    Removal Indications:    Wound Image   05/07/24 1639   Wound WDL ex 05/07/24 1639   Dressing Appearance Intact;Moist drainage 05/07/24 1639   Drainage Amount Moderate 05/07/24 1639   Drainage Characteristics/Odor Serosanguineous;Creamy 05/07/24 1639   Appearance Sutures intact;Dressing in place, unable to visualize 05/07/24 1639   Care Cleansed with:;Sterile normal saline;Applied:;Steri-strips 05/07/24 1639   Dressing Reinforced;Absorptive Pad;Foam;Cast padding;Compression wrap 05/07/24 1639   Periwound Care Skin barrier film applied 05/07/24 1639   Compression Two layer compression 05/07/24 1639   Off Loading Football dressing;Off loading shoe 05/07/24 1639   Dressing Change Due 05/10/24 05/07/24 1639           Plan:     Wound Dressing Orders      Dressing change frequency twice a week (prn nurse visit)  Remove old  "dressing  Cleanse or irrigate with: Normal Saline     Moisturize dry skin on leg with Enlivaderm    Protect periwound with:  Gentian Violet hugging wound bed, Benzoin  Primary dressing: WOUND BASE: sutures and Cutimed are intact, reapplied Steri-Strips  Secondary dressing: Aquacel Extra bolstered to wound bed with Steri-Strip to secure. Mextra 5" x 9", Mepilex non-border x 2 to off-load    Compression/Off-load: Football (cast padding x 2 rolls, 1/2 roll fan folded to plantar for cushion). UrgoK2, size Large, 40 mmHg (ankle size 26 cm) and CAM Boot on the affected foot             Follow up in about 3 days (around 5/10/2024) for Wound Care.        "

## 2024-05-10 ENCOUNTER — HOSPITAL ENCOUNTER (OUTPATIENT)
Dept: WOUND CARE | Facility: HOSPITAL | Age: 54
Discharge: HOME | End: 2024-05-10
Attending: PODIATRIST
Payer: MEDICARE

## 2024-05-10 VITALS — HEART RATE: 89 BPM | DIASTOLIC BLOOD PRESSURE: 95 MMHG | TEMPERATURE: 98 F | SYSTOLIC BLOOD PRESSURE: 155 MMHG

## 2024-05-10 DIAGNOSIS — L97.426 DIABETIC ULCER OF LEFT MIDFOOT ASSOCIATED WITH TYPE 2 DIABETES MELLITUS, WITH BONE INVOLVEMENT WITHOUT EVIDENCE OF NECROSIS: Primary | ICD-10-CM

## 2024-05-10 DIAGNOSIS — E11.621 DIABETIC ULCER OF LEFT MIDFOOT ASSOCIATED WITH TYPE 2 DIABETES MELLITUS, WITH BONE INVOLVEMENT WITHOUT EVIDENCE OF NECROSIS: Primary | ICD-10-CM

## 2024-05-10 DIAGNOSIS — E11.49 TYPE II DIABETES MELLITUS WITH NEUROLOGICAL MANIFESTATIONS: ICD-10-CM

## 2024-05-10 PROCEDURE — 15271 SKIN SUB GRAFT TRNK/ARM/LEG: CPT | Performed by: PODIATRIST

## 2024-05-10 PROCEDURE — 99499 UNLISTED E&M SERVICE: CPT | Mod: ,,, | Performed by: PODIATRIST

## 2024-05-10 NOTE — PROGRESS NOTES
"[facility]                Progress Note    Subjective:       Patient ID: Indio Ryder Jr. is a 53 y.o. male.    Chief Complaint: Wound Check, Dressing Change, and Diabetic Foot Ulcer    Follow up wound care visit. Patient ambulated to exam room using knee scooter to off-load left foot with prescribed walker boot and diabetic tennis shoe w/lift on right foot. He denies fever, chills, nausea, vomiting or diarrhea at present. No c/o pain or discomfort to wound bed at present. Dressing is intact to LLE, with no strike through drainage. Patient reports dressing "felt like it was shifting then I adjusted the strap on the boot", he reports no further problems thereafter.       Review of Systems   Constitutional:  Negative for appetite change, chills, fatigue and fever.   HENT:  Negative for hearing loss.    Eyes:  Negative for photophobia and visual disturbance.   Respiratory:  Negative for cough, chest tightness, shortness of breath and wheezing.    Cardiovascular:  Positive for leg swelling. Negative for chest pain and palpitations.   Gastrointestinal:  Negative for constipation, diarrhea, nausea and vomiting.   Endocrine: Negative for cold intolerance and heat intolerance.   Genitourinary:  Negative for flank pain.   Musculoskeletal:  Positive for gait problem and myalgias. Negative for neck pain and neck stiffness.   Skin:  Positive for wound. Negative for color change.   Neurological:  Positive for numbness. Negative for weakness, light-headedness and headaches.        + paresthesia    Psychiatric/Behavioral:  Negative for sleep disturbance.          Objective:        Physical Exam  Constitutional:       Appearance: He is well-developed.   Cardiovascular:      Comments: Dorsalis pedis and posterior tibial pulses are palpable Skin is atrophic, slightly hyperpigmented, and mildly edematous      Musculoskeletal:         General: No tenderness. Normal range of motion.    "   Comments: Muscle strength is 5/5 in all groups bilaterally.    S/p partial 5th ray amp b/l    S/p hallux amp right    Fat pad atrophy to heels and met heads bilateral       Skin:     General: Skin is warm and dry.      Coloration: Skin is not jaundiced, mottled or sallow.      Findings: Wound (see below) present. No abscess or ecchymosis.      Comments:        Neurological:      Mental Status: He is alert and oriented to person, place, and time.      Comments: [place]-Nenita 5.07 monofilamant testing is diminished Kenji feet. Sharp/dull sensation diminished Bilaterally. Light touch absent Bilaterally.       Psychiatric:         Behavior: Behavior normal.       Vitals:    05/10/24 0802   BP: (!) 155/95   Pulse: 89   Temp: 98 °F (36.7 °C)       Assessment:           ICD-10-CM ICD-9-CM   1. Diabetic ulcer of left midfoot associated with type 2 diabetes mellitus, with bone involvement without evidence of necrosis  E11.621 250.80    L97.426 707.14   2. Type II diabetes mellitus with neurological manifestations  E11.49 250.60            (Retired) Wound 04/08/22 1137 Diabetic Ulcer Left plantar;lateral Foot (Active)   04/08/22 1137    Pre-existing: Yes   Primary Wound Type: Diabetic ulc   Side: Left   Orientation: plantar;lateral   Location: Foot   Wound Number:    Ankle-Brachial Index:    Pulses:    Removal Indication and Assessment:    Wound Outcome:    (Retired) Wound Type:    (Retired) Wound Length (cm):    (Retired) Wound Width (cm):    (Retired) Depth (cm):    Wound Description (Comments):    Removal Indications:    Wound Image   05/10/24 1557   Wound WDL ex 05/10/24 1557   Dressing Appearance Intact;Moist drainage 05/10/24 1557   Drainage Amount Large 05/10/24 1557   Drainage Characteristics/Odor Serosanguineous;No odor 05/10/24 1557   Appearance Pink;Moist;Sutures intact 05/10/24 1557   Tissue loss description Full thickness 05/10/24 1557   Black (%), Wound Tissue Color 0 % 05/10/24 1557   Red (%), Wound Tissue  Color 100 % 05/10/24 1557   Yellow (%), Wound Tissue Color 0 % 05/10/24 1557   Periwound Area Pale white;Macerated 05/10/24 1557   Wound Edges Defined;Open 05/10/24 1557   Wound Length (cm) 5.3 cm 05/10/24 1557   Wound Width (cm) 2.2 cm 05/10/24 1557   Wound Depth (cm) 0.2 cm 05/10/24 1557   Wound Volume (cm^3) 2.332 cm^3 05/10/24 1557   Wound Surface Area (cm^2) 11.66 cm^2 05/10/24 1557   Tunneling (depth (cm)/location) 0 05/10/24 1557   Undermining (depth (cm)/location) 0 05/10/24 1557   Care Cleansed with:;Antimicrobial agent;Sterile normal saline;Sutures removed;Applied:;Povidone iodine;Debrided 05/10/24 1557   Dressing Applied;Collagen;Non-adherent;Hydrofiber;Absorptive Pad;Foam;Cast padding;Compression wrap 05/10/24 1557   Periwound Care Moisture barrier applied 05/10/24 1557   Compression Two layer compression 05/10/24 1557   Off Loading Football dressing;Off loading shoe 05/10/24 1557   Dressing Change Due 05/17/24 05/10/24 1557           Plan:                Education about the prevention of limb loss.    Discussed wound healing cycle, skin integrity, ways to care for skin.Counseled patient on the effects of biomechanical pressure,  PVD, and blood glucose on healing. He verbalizes understanding that it can increase the chances of delayed healing and this prolonged exposure leads to infection or progression of infection which subsequently can result in loss of limb.      Skin substitute    Date/Time: 5/10/2024 7:31 AM    Performed by: Marivel Peterson DPM  Authorized by: Marivel Peterson DPM    Procedure details:     Total wound surface area (cm^2):  12    Product applied:  Epifix    Amount used (cm^2):  7    Amount wasted (cm^2):  0    Secured: Yes      Secured with:  Steri strips  Post-procedure details:     Patient tolerance of procedure:  Tolerated well, no immediate complications      The site is cleansed of foreign material as much as possible, Left Plantar Foot wound is measuring smaller, EpiFix skin  "sub applied to wound with plan for another application at visit in 1 week. Wound care done as per order.    Tissue pathology and/or culture taken     [] Yes      [x] No  Sharp debridement performed                   [x] Yes       [] No  Labs ordered     [] Yes       [x] No  Imaging ordered    [] Yes      [x] No    Orders Placed This Encounter   Procedures    Change dressing     Wound Dressing Orders      Dressing change frequency twice a week (prn nurse visit)  Remove old dressing  Cleanse or irrigate with: Iodine scrub x 2 minutes    Moisturize dry skin on leg with Enlivaderm    Protect periwound with:  Gentian Violet hugging wound bed, Benzoin  Primary dressing: WOUND BASE: EpiFix to wound bed then Cutimed secured with Steri-Strips then Drawtex cut to size of wound secured by Steri-Strips by MD  Secondary dressing: Mextra 5" x 9" laid vertically then Mepilex non-border foam x 2 to off-load.    Compression/Off-load: Football (cast padding x 2 rolls, 1/2 roll fan folded to plantar for cushion). UrgoK2, size Large, 40 mmHg (ankle size 26 cm) and CAM Boot on the affected foot    Change at Nurse Visit. Change up to Cutimed/Steri-Strip layer, unless not adhered then change Cutimed and Steri-Strips.        Follow up in about 4 days (around 5/14/2024) for Nurse Visit.       "

## 2024-05-14 ENCOUNTER — HOSPITAL ENCOUNTER (OUTPATIENT)
Dept: WOUND CARE | Facility: HOSPITAL | Age: 54
Discharge: HOME | End: 2024-05-14
Attending: FAMILY MEDICINE
Payer: MEDICARE

## 2024-05-14 VITALS
RESPIRATION RATE: 16 BRPM | TEMPERATURE: 98 F | HEART RATE: 93 BPM | DIASTOLIC BLOOD PRESSURE: 78 MMHG | SYSTOLIC BLOOD PRESSURE: 130 MMHG

## 2024-05-14 DIAGNOSIS — E11.49 TYPE II DIABETES MELLITUS WITH NEUROLOGICAL MANIFESTATIONS: ICD-10-CM

## 2024-05-14 DIAGNOSIS — E11.621 DIABETIC ULCER OF LEFT MIDFOOT ASSOCIATED WITH TYPE 2 DIABETES MELLITUS, WITH BONE INVOLVEMENT WITHOUT EVIDENCE OF NECROSIS: Primary | ICD-10-CM

## 2024-05-14 DIAGNOSIS — L97.426 DIABETIC ULCER OF LEFT MIDFOOT ASSOCIATED WITH TYPE 2 DIABETES MELLITUS, WITH BONE INVOLVEMENT WITHOUT EVIDENCE OF NECROSIS: Primary | ICD-10-CM

## 2024-05-14 PROCEDURE — 29581 APPL MULTLAYER CMPRN SYS LEG: CPT

## 2024-05-14 NOTE — PROGRESS NOTES
[facility]  [address]  [address]  Nurse Visit    Subjective:       Patient seen in clinic today. Patient ambulated to exam room using knee scooter to off-load left foot with walker boot on and diabetic tennis shoe w/lift on right foot. He denies fever, chills, nausea, vomiting or diarrhea. No c/o pain or discomfort to wound bed at present. Dressing is intact to LLE, with no strike through drainage noted. Cutimed is intact, changed dressing including new Steri-strips and all layers thereafter as listed in MD order below.  Dressing changed as ordered.      Assessment:          (Retired) Wound 04/08/22 1137 Diabetic Ulcer Left plantar;lateral Foot (Active)   04/08/22 1137    Pre-existing: Yes   Primary Wound Type: Diabetic ulc   Side: Left   Orientation: plantar;lateral   Location: Foot   Wound Number:    Ankle-Brachial Index:    Pulses:    Removal Indication and Assessment:    Wound Outcome:    (Retired) Wound Type:    (Retired) Wound Length (cm):    (Retired) Wound Width (cm):    (Retired) Depth (cm):    Wound Description (Comments):    Removal Indications:    Wound Image   05/14/24 1645   Wound WDL ex 05/14/24 1645   Dressing Appearance Intact;Moist drainage 05/14/24 1645   Drainage Amount Large 05/14/24 1645   Drainage Characteristics/Odor Serosanguineous;No odor 05/14/24 1645   Appearance Dressing in place, unable to visualize;Steri-strips intact 05/14/24 1645   Care Cleansed with:;Sterile normal saline;Applied:;Steri-strips 05/14/24 1645   Dressing Applied;Hydrofiber;Absorptive Pad;Foam;Cast padding;Compression wrap 05/14/24 1645   Periwound Care Moisture barrier applied;Skin barrier film applied 05/14/24 1645   Compression Two layer compression 05/14/24 1645   Off Loading Football dressing;Off loading shoe 05/14/24 1645   Dressing Change Due 05/17/24 05/14/24 1645           Plan:     Wound Dressing Orders       Dressing change frequency twice a week (prn nurse visit)  "  Remove old dressing   Cleanse or irrigate with: Normal Saline, pat dry with gauze     Moisturize dry skin on leg with Enlivaderm     Protect periwound with:  Gentian Violet hugging wound bed and areas of maceration, [place]/Benzoin   Primary dressing: WOUND BASE: skin sub in place, apply new Steri-Strips to perimeter of Cutimed then Drawtex cut to size of wound secured by Steri-Strips   Secondary dressing: Mextra 5" x 9" laid vertically then Mepilex non-border foam x 2 to off-load.    Compression/Off-load: Football (cast padding x 2 rolls, 1/2 roll fan folded to plantar for cushion). UrgoK2, size Large, 40 mmHg (ankle size 26 cm) and CAM Boot on the affected foot           Follow up in about 3 days (around 5/17/2024) for Wound Care.        "

## 2024-05-17 ENCOUNTER — HOSPITAL ENCOUNTER (OUTPATIENT)
Dept: WOUND CARE | Facility: HOSPITAL | Age: 54
Discharge: HOME | End: 2024-05-17
Attending: PODIATRIST
Payer: MEDICARE

## 2024-05-17 VITALS
HEART RATE: 95 BPM | RESPIRATION RATE: 18 BRPM | SYSTOLIC BLOOD PRESSURE: 135 MMHG | DIASTOLIC BLOOD PRESSURE: 86 MMHG | TEMPERATURE: 98 F

## 2024-05-17 DIAGNOSIS — M86.10 ACUTE ON CHRONIC OSTEOMYELITIS: ICD-10-CM

## 2024-05-17 DIAGNOSIS — M86.60 ACUTE ON CHRONIC OSTEOMYELITIS: ICD-10-CM

## 2024-05-17 DIAGNOSIS — L97.426 DIABETIC ULCER OF LEFT MIDFOOT ASSOCIATED WITH TYPE 2 DIABETES MELLITUS, WITH BONE INVOLVEMENT WITHOUT EVIDENCE OF NECROSIS: ICD-10-CM

## 2024-05-17 DIAGNOSIS — E11.49 TYPE II DIABETES MELLITUS WITH NEUROLOGICAL MANIFESTATIONS: Primary | ICD-10-CM

## 2024-05-17 DIAGNOSIS — E11.621 DIABETIC ULCER OF LEFT MIDFOOT ASSOCIATED WITH TYPE 2 DIABETES MELLITUS, WITH BONE INVOLVEMENT WITHOUT EVIDENCE OF NECROSIS: ICD-10-CM

## 2024-05-17 PROCEDURE — 11044 DBRDMT BONE 1ST 20 SQ CM/<: CPT | Mod: ,,, | Performed by: PODIATRIST

## 2024-05-17 PROCEDURE — 88307 TISSUE EXAM BY PATHOLOGIST: CPT | Mod: 26,,, | Performed by: PATHOLOGY

## 2024-05-17 PROCEDURE — 88311 DECALCIFY TISSUE: CPT | Mod: 26,,, | Performed by: PATHOLOGY

## 2024-05-17 PROCEDURE — 87070 CULTURE OTHR SPECIMN AEROBIC: CPT | Performed by: PODIATRIST

## 2024-05-17 PROCEDURE — 11044 DBRDMT BONE 1ST 20 SQ CM/<: CPT | Performed by: PODIATRIST

## 2024-05-17 PROCEDURE — 87075 CULTR BACTERIA EXCEPT BLOOD: CPT | Performed by: PODIATRIST

## 2024-05-17 PROCEDURE — 87205 SMEAR GRAM STAIN: CPT | Performed by: PODIATRIST

## 2024-05-17 PROCEDURE — 88307 TISSUE EXAM BY PATHOLOGIST: CPT | Performed by: PATHOLOGY

## 2024-05-17 PROCEDURE — 87077 CULTURE AEROBIC IDENTIFY: CPT | Performed by: PODIATRIST

## 2024-05-17 PROCEDURE — 87186 SC STD MICRODIL/AGAR DIL: CPT | Performed by: PODIATRIST

## 2024-05-17 PROCEDURE — 99214 OFFICE O/P EST MOD 30 MIN: CPT | Mod: 25,,, | Performed by: PODIATRIST

## 2024-05-17 PROCEDURE — 88311 DECALCIFY TISSUE: CPT | Performed by: PATHOLOGY

## 2024-05-17 RX ORDER — LEVOFLOXACIN 750 MG/1
750 TABLET ORAL EVERY OTHER DAY
Qty: 15 TABLET | Refills: 0 | Status: SHIPPED | OUTPATIENT
Start: 2024-05-17

## 2024-05-17 NOTE — PROGRESS NOTES
[facility]                Progress Note    Subjective:       Patient ID: Indio Ryder Jr. is a 53 y.o. male.    Chief Complaint: Wound Check, Dressing Change, and Diabetic Foot Ulcer    Follow up wound care visit. Patient ambulated to exam room using knee scooter to off-load left foot with prescribed walker boot and diabetic tennis shoe w/lift on right foot. He denies fever, chills, nausea, vomiting or diarrhea at present. No c/o pain or discomfort to wound bed at present. Dressing is intact to LLE, with no strike through drainage. He denies increased ambulation however he has been working as medical transportation.        Review of Systems   Constitutional:  Negative for appetite change, chills, fatigue and fever.   HENT:  Negative for hearing loss.    Eyes:  Negative for photophobia and visual disturbance.   Respiratory:  Negative for cough, chest tightness, shortness of breath and wheezing.    Cardiovascular:  Positive for leg swelling. Negative for chest pain and palpitations.   Gastrointestinal:  Negative for constipation, diarrhea, nausea and vomiting.   Endocrine: Negative for cold intolerance and heat intolerance.   Genitourinary:  Negative for flank pain.   Musculoskeletal:  Positive for gait problem and myalgias. Negative for neck pain and neck stiffness.   Skin:  Positive for wound. Negative for color change.   Neurological:  Positive for numbness. Negative for weakness, light-headedness and headaches.        + paresthesia    Psychiatric/Behavioral:  Negative for sleep disturbance.          Objective:        Physical Exam  Constitutional:       Appearance: He is well-developed.   Cardiovascular:      Comments: Dorsalis pedis and posterior tibial pulses are palpable Skin is atrophic, slightly hyperpigmented, and mildly edematous      Musculoskeletal:         General: No tenderness. Normal range of motion.      Comments: Muscle strength is 5/5 in all  groups bilaterally.    S/p partial 5th ray amp b/l    S/p hallux amp right    Fat pad atrophy to heels and met heads bilateral       Skin:     General: Skin is warm and dry.      Coloration: Skin is not jaundiced, mottled or sallow.      Findings: Wound (see below) present. No abscess or ecchymosis.      Comments:        Neurological:      Mental Status: He is alert and oriented to person, place, and time.      Comments: [place]en[place] 5.07 monofilamant testing is diminished Kenji feet. Sharp/dull sensation diminished Bilaterally. Light touch absent Bilaterally.       Psychiatric:         Behavior: Behavior normal.         Vitals:    05/17/24 0800   BP: 135/86   Pulse: 95   Resp: 18   Temp: 97.7 °F (36.5 °C)       Assessment:           ICD-10-CM ICD-9-CM   1. Type II diabetes mellitus with neurological manifestations  E11.49 250.60   2. Diabetic ulcer of left midfoot associated with type 2 diabetes mellitus, with bone involvement without evidence of necrosis  E11.621 250.80    L97.426 707.14   3. Acute on chronic osteomyelitis  M86.10 730.00    M86.60 730.10            (Retired) Wound 04/08/22 1137 Diabetic Ulcer Left plantar;lateral Foot (Active)   04/08/22 1137    Pre-existing: Yes   Primary Wound Type: Diabetic ulc   Side: Left   Orientation: plantar;lateral   Location: Foot   Wound Number:    Ankle-Brachial Index:    Pulses:    Removal Indication and Assessment:    Wound Outcome:    (Retired) Wound Type:    (Retired) Wound Length (cm):    (Retired) Wound Width (cm):    (Retired) Depth (cm):    Wound Description (Comments):    Removal Indications:    Wound Image   05/17/24 1434   Wound WDL ex 05/17/24 1434   Dressing Appearance Intact;Moist drainage 05/17/24 1434   Drainage Amount Moderate 05/17/24 1434   Drainage Characteristics/Odor Serosanguineous 05/17/24 1434   Appearance Red;Tendon;Moist 05/17/24 1434   Tissue loss description Full thickness 05/17/24 1434   Black (%), Wound Tissue Color 0 % 05/17/24 1434    Red (%), Wound Tissue Color 95 % 05/17/24 1434   Yellow (%), Wound Tissue Color 5 % 05/17/24 1434   Periwound Area Pale white 05/17/24 1434   Wound Edges Defined;Open 05/17/24 1434   Wound Length (cm) 5 cm 05/17/24 1434   Wound Width (cm) 2.4 cm 05/17/24 1434   Wound Depth (cm) 0.3 cm 05/17/24 1434   Wound Volume (cm^3) 3.6 cm^3 05/17/24 1434   Wound Surface Area (cm^2) 12 cm^2 05/17/24 1434   Tunneling (depth (cm)/location) 0 05/17/24 1434   Undermining (depth (cm)/location) 0.5 cm 9-3 o'clock within wound bed 05/17/24 1434   Care Cleansed with:;Antimicrobial agent;Sterile normal saline;Applied:;Povidone iodine;Debrided 05/17/24 1434   Dressing Applied;Collagen;Hydrofiber;Absorptive Pad;Foam;Cast padding;Tubular bandage 05/17/24 1434   Periwound Care Moisture barrier applied 05/17/24 1434   Compression Tubular elasticized bandage 05/17/24 1434   Off Loading Football dressing;Off loading shoe 05/17/24 1434   Dressing Change Due 05/21/24 05/17/24 1434           Plan:                Education about the prevention of limb loss.    Discussed wound healing cycle, skin integrity, ways to care for skin.Counseled patient on the effects of biomechanical pressure,  PVD, and blood glucose on healing. He verbalizes understanding that it can increase the chances of delayed healing and this prolonged exposure leads to infection or progression of infection which subsequently can result in loss of limb.    Left Foot Plantar wound is deeper at the center of wound bed, with tendon exposed and probing to bone.     Wound Debridement    Date/Time: 5/17/2024 7:28 AM    Performed by: Marivel Peterson DPM  Authorized by: Marivel Peterson DPM      Wound Details:    Location:  Left foot    Location:  Left Plantar    Type of Debridement:  Excisional       Length (cm):  5       Area (sq cm):  12       Width (cm):  2.4       Percent Debrided (%):  100       Depth (cm):  0.3       Total Area Debrided (sq cm):  12    Depth of debridement:  Bone     Tissue debrided:  Dermis, Epidermis, Bone and Subcutaneous    Devitalized tissue debrided:  Callus and Fibrin    Instruments:  Curette  Bleeding:  Moderate  Hemostasis Achieved: Yes  Method Used:  Pressure  Patient tolerance:  Patient tolerated the procedure well with no immediate complications     Bone debridement done, specimens sent to micro and pathology. Levaquin oral sent to pharmacy for patient to start today and then every other day thereafter x 30 days. Patient advised if pathology shows a different ABX therapy is required, he will be notified. Patient verbalized understanding. Patient given bleeding precautions d/t bone debridement done today (feeling lightheaded, dizzy, strike through bleeding from Left Foot, fever over 100.4 F, pain not tolerable, purulent or foul odor drainage seek emergent help).      Tissue pathology and/or culture taken     [x] Yes      [] No  Sharp debridement performed                   [x] Yes       [] No  Labs ordered     [] Yes       [x] No  Imaging ordered    [] Yes      [x] No    Orders Placed This Encounter   Procedures    Debridement     This order was created via procedure documentation     Standing Status:   Standing     Number of Occurrences:   1    Gram stain     Standing Status:   Standing     Number of Occurrences:   1    Culture, Anaerobe     Standing Status:   Standing     Number of Occurrences:   1    Aerobic culture     Standing Status:   Standing     Number of Occurrences:   1    Change dressing     Wound Dressing Orders       Dressing change frequency twice a week (prn nurse visit)   Remove old dressing   Cleanse or irrigate with: Iodine scrub x 2 minutes     Moisturize dry skin on leg with Enlivaderm     Protect periwound with:  Gentian Violet hugging wound bed the [place] where custom foam will be applied  Primary dressing: WOUND BASE: [place] to wound bed then Custom foam pad (2 layers, cut to cover entire plantar foot with hole cut to allow for drainage),  "Drawtex cut to size of hole in custom foam (2 layers applied d/t bleeding)  Secondary dressing: Mextra 5" x 9" laid vertically then Mepilex non-border foam x 2 to off-load.    Compression/Off-load: Football (cast padding x 2 rolls, 1/2 roll fan folded to plantar for cushion). Coban to secure. Tubigrip, single layer, size E and CAM Boot on the affected foot      Change at Nurse Visit. Clean with Hibiclens and Normal Saline instead of Iodine scrub. Apply dressing as listed.        Follow up in about 4 days (around 5/21/2024) for Nurse Visit.       "

## 2024-05-18 LAB
GRAM STN SPEC: NORMAL
GRAM STN SPEC: NORMAL

## 2024-05-20 LAB — BACTERIA SPEC AEROBE CULT: ABNORMAL

## 2024-05-20 RX ORDER — INSULIN DETEMIR 100 [IU]/ML
26 INJECTION, SOLUTION SUBCUTANEOUS NIGHTLY
Qty: 15 ML | Refills: 6 | Status: SHIPPED | OUTPATIENT
Start: 2024-05-20 | End: 2024-05-20

## 2024-05-20 RX ORDER — INSULIN GLARGINE 300 [IU]/ML
INJECTION, SOLUTION SUBCUTANEOUS
Qty: 2 PEN | Refills: 6 | Status: SHIPPED | OUTPATIENT
Start: 2024-05-20

## 2024-05-21 ENCOUNTER — TELEPHONE (OUTPATIENT)
Dept: WOUND CARE | Facility: HOSPITAL | Age: 54
End: 2024-05-21
Payer: MEDICARE

## 2024-05-21 ENCOUNTER — HOSPITAL ENCOUNTER (OUTPATIENT)
Dept: WOUND CARE | Facility: HOSPITAL | Age: 54
Discharge: HOME | End: 2024-05-21
Attending: PODIATRIST
Payer: MEDICARE

## 2024-05-21 VITALS
RESPIRATION RATE: 18 BRPM | TEMPERATURE: 98 F | HEART RATE: 101 BPM | DIASTOLIC BLOOD PRESSURE: 89 MMHG | SYSTOLIC BLOOD PRESSURE: 135 MMHG

## 2024-05-21 DIAGNOSIS — M86.60 ACUTE ON CHRONIC OSTEOMYELITIS: ICD-10-CM

## 2024-05-21 DIAGNOSIS — E11.621 DIABETIC ULCER OF LEFT MIDFOOT ASSOCIATED WITH TYPE 2 DIABETES MELLITUS, WITH BONE INVOLVEMENT WITHOUT EVIDENCE OF NECROSIS: ICD-10-CM

## 2024-05-21 DIAGNOSIS — E11.49 TYPE II DIABETES MELLITUS WITH NEUROLOGICAL MANIFESTATIONS: Primary | ICD-10-CM

## 2024-05-21 DIAGNOSIS — M86.10 ACUTE ON CHRONIC OSTEOMYELITIS: ICD-10-CM

## 2024-05-21 DIAGNOSIS — L97.426 DIABETIC ULCER OF LEFT MIDFOOT ASSOCIATED WITH TYPE 2 DIABETES MELLITUS, WITH BONE INVOLVEMENT WITHOUT EVIDENCE OF NECROSIS: ICD-10-CM

## 2024-05-21 LAB — BACTERIA SPEC ANAEROBE CULT: NORMAL

## 2024-05-21 PROCEDURE — 99213 OFFICE O/P EST LOW 20 MIN: CPT

## 2024-05-21 NOTE — PROGRESS NOTES
[facility]  [address]  [id number]  Nurse Visit    Subjective:       Patient seen in clinic today.  Patient ambulated to exam room using knee scooter to off-load left foot. Prescribed walker boot on Left Foot and diabetic tennis shoe w/lift on Right Foot. He denies fever, chills, nausea, vomiting or diarrhea at present. No c/o pain or discomfort to wound bed at present. Dressing is intact to LLE, with strike through drainage. Patient reports taking oral Levaquin every other day as ordered, without complaint. New CAM boot applied to LLE. Appointment for Infectious Disease scheduled on May 30. Patient advised DPM ordered topical ABX powder from [facility] (to be delivered to patient's home). Dressing changed as ordered.      Assessment:          (Retired) Wound 04/08/22 1137 Diabetic Ulcer Left plantar;lateral Foot (Active)   04/08/22 1137    Pre-existing: Yes   Primary Wound Type: Diabetic ulc   Side: Left   Orientation: plantar;lateral   Location: Foot   Wound Number:    Ankle-Brachial Index:    Pulses:    Removal Indication and Assessment:    Wound Outcome:    (Retired) Wound Type:    (Retired) Wound Length (cm):    (Retired) Wound Width (cm):    (Retired) Depth (cm):    Wound Description (Comments):    Removal Indications:    Wound Image   05/21/24 1726   Wound WDL ex 05/21/24 1726   Dressing Appearance Intact;Moist drainage;Area marked 05/21/24 1726   Drainage Amount Large 05/21/24 1726   Drainage Characteristics/Odor Serosanguineous 05/21/24 1726   Appearance Pink;Red;Tendon;Moist 05/21/24 1726   Tissue loss description Full thickness 05/21/24 1726   Black (%), Wound Tissue Color 0 % 05/21/24 1726   Red (%), Wound Tissue Color 95 % 05/21/24 1726   Yellow (%), Wound Tissue Color 5 % 05/21/24 1726   Periwound Area Pale white;Moist;Macerated 05/21/24 1726   Wound Edges Defined;Open 05/21/24 1726   Care Cleansed with:;Antimicrobial agent;Sterile normal saline  "05/21/24 1726   Dressing Applied;Collagen;Hydrofiber;Non-adherent;Foam;Cast padding;Tubular bandage 05/21/24 1726   Periwound Care Moisture barrier applied 05/21/24 1726   Compression Tubular elasticized bandage 05/21/24 1726   Off Loading Football dressing;Off loading shoe 05/21/24 1726   Dressing Change Due 05/24/24 05/21/24 1726           Plan:     Wound Dressing Orders      Dressing change frequency twice a week   Remove old dressing  Cleanse or irrigate with: Hibiclens and Saline    Moisturize dry skin on leg with Enlivaderm    Protect periwound with:  Gentian Violet hugging wound bed the [place] where custom foam will be applied  Primary dressing: WOUND BASE: [place] to wound bed then Custom foam pad (2 layers, cut to cover entire plantar foot with hole cut to allow for drainage), Drawtex cut to size of hole in custom foam (2 layers applied d/t drainage)  Secondary dressing: Mextra 5" x 9" laid vertically     Compression/Off-load: Football (cast padding x 2 rolls, 1/2 roll fan folded to plantar for cushion). Coban to secure. Tubigrip, single layer, size E and CAM Boot on the affected foot             Follow up in about 3 days (around 5/24/2024) for Wound Care.        "

## 2024-05-21 NOTE — TELEPHONE ENCOUNTER
Spoke to patient at nurse visit. Advised of culture result (+ Enterococcus Faecalis) and DPM order for topical ABX powder that will be sent by [facility]. Scheduled appointment with Infectious Disease on May 30 and gave patient phone number to contact in case he needs to change date of appt or has questions.

## 2024-05-21 NOTE — TELEPHONE ENCOUNTER
----- Message from Marivel Peterson DPM sent at 5/21/2024  7:35 AM CDT -----  Can we send this to [facility] for him to get antibiotic powder?  He will also need infectious disease follow up, he is an established patient    Thank you  ----- Message -----  From: [facility]  Sent: 5/18/2024  10:30 AM CDT  To: Marivel Peterson DPM

## 2024-05-22 LAB
FINAL PATHOLOGIC DIAGNOSIS: NORMAL
GROSS: NORMAL
Lab: NORMAL

## 2024-05-24 ENCOUNTER — HOSPITAL ENCOUNTER (OUTPATIENT)
Dept: WOUND CARE | Facility: HOSPITAL | Age: 54
Discharge: HOME | End: 2024-05-24
Attending: PODIATRIST
Payer: MEDICARE

## 2024-05-24 VITALS — TEMPERATURE: 97 F | DIASTOLIC BLOOD PRESSURE: 88 MMHG | HEART RATE: 111 BPM | SYSTOLIC BLOOD PRESSURE: 147 MMHG

## 2024-05-24 DIAGNOSIS — M86.10 ACUTE ON CHRONIC OSTEOMYELITIS: ICD-10-CM

## 2024-05-24 DIAGNOSIS — L97.426 DIABETIC ULCER OF LEFT MIDFOOT ASSOCIATED WITH TYPE 2 DIABETES MELLITUS, WITH BONE INVOLVEMENT WITHOUT EVIDENCE OF NECROSIS: ICD-10-CM

## 2024-05-24 DIAGNOSIS — E11.621 DIABETIC ULCER OF LEFT MIDFOOT ASSOCIATED WITH TYPE 2 DIABETES MELLITUS, WITH BONE INVOLVEMENT WITHOUT EVIDENCE OF NECROSIS: ICD-10-CM

## 2024-05-24 DIAGNOSIS — M86.60 ACUTE ON CHRONIC OSTEOMYELITIS: ICD-10-CM

## 2024-05-24 DIAGNOSIS — E11.49 TYPE II DIABETES MELLITUS WITH NEUROLOGICAL MANIFESTATIONS: Primary | ICD-10-CM

## 2024-05-24 PROCEDURE — 11042 DBRDMT SUBQ TIS 1ST 20SQCM/<: CPT | Performed by: PODIATRIST

## 2024-05-24 PROCEDURE — 99499 UNLISTED E&M SERVICE: CPT | Mod: ,,, | Performed by: PODIATRIST

## 2024-05-24 PROCEDURE — 11043 DBRDMT MUSC&/FSCA 1ST 20/<: CPT | Mod: ,,, | Performed by: PODIATRIST

## 2024-05-24 NOTE — PROGRESS NOTES
[facility]                Progress Note    Subjective:       Patient ID: Indio Rdyer Jr. is a 53 y.o. male.    Chief Complaint: Wound Check, Dressing Change, and Diabetic Foot Ulcer    Follow up wound care visit. Patient ambulated to exam room using knee scooter to off-load left foot with prescribed CAM boot on LLE and diabetic tennis shoe w/lift on right foot. He denies fever, chills, nausea, vomiting or diarrhea at present. No c/o pain or discomfort to wound bed at present. He reports taking Levaquin every other day as ordered without complaint. He will continue oral ABX therapy until seen by Infectious Disease on 05/30/24 and advised of next therapy at that time. Dressing is intact to LLE, with no strike through drainage.         Review of Systems   Constitutional:  Negative for appetite change, chills, fatigue and fever.   HENT:  Negative for hearing loss.    Eyes:  Negative for photophobia and visual disturbance.   Respiratory:  Negative for cough, chest tightness, shortness of breath and wheezing.    Cardiovascular:  Positive for leg swelling. Negative for chest pain and palpitations.   Gastrointestinal:  Negative for constipation, diarrhea, nausea and vomiting.   Endocrine: Negative for cold intolerance and heat intolerance.   Genitourinary:  Negative for flank pain.   Musculoskeletal:  Positive for gait problem and myalgias. Negative for neck pain and neck stiffness.   Skin:  Positive for wound. Negative for color change.   Neurological:  Positive for numbness. Negative for weakness, light-headedness and headaches.        + paresthesia    Psychiatric/Behavioral:  Negative for sleep disturbance.          Objective:        Physical Exam  Constitutional:       Appearance: He is well-developed.   Cardiovascular:      Comments: Dorsalis pedis and posterior tibial pulses are palpable Skin is atrophic, slightly hyperpigmented, and mildly  edematous      Musculoskeletal:         General: No tenderness. Normal range of motion.      Comments: Muscle strength is 5/5 in all groups bilaterally.    S/p partial 5th ray amp b/l    S/p hallux amp right    Fat pad atrophy to heels and met heads bilateral       Skin:     General: Skin is warm and dry.      Coloration: Skin is not jaundiced, mottled or sallow.      Findings: Wound (see below) present. No abscess or ecchymosis.      Comments:        Neurological:      Mental Status: He is alert and oriented to person, place, and time.      Comments: [place]-Nenita 5.07 monofilamant testing is diminished Kenji feet. Sharp/dull sensation diminished Bilaterally. Light touch absent Bilaterally.       Psychiatric:         Behavior: Behavior normal.         Vitals:    05/24/24 0808   BP: (!) 147/88   Pulse: (!) 111   Temp: 97.3 °F (36.3 °C)       Assessment:           ICD-10-CM ICD-9-CM   1. Type II diabetes mellitus with neurological manifestations  E11.49 250.60   2. Diabetic ulcer of right foot associated with type 2 diabetes mellitus, with fat layer exposed  E11.621 250.80    L97.512 707.15   3. Diabetic ulcer of left midfoot associated with type 2 diabetes mellitus, with bone involvement without evidence of necrosis  E11.621 250.80    L97.426 707.14   4. Acute on chronic osteomyelitis  M86.10 730.00    M86.60 730.10            (Retired) Wound 04/08/22 1137 Diabetic Ulcer Left plantar;lateral Foot (Active)   04/08/22 1137    Pre-existing: Yes   Primary Wound Type: Diabetic ulc   Side: Left   Orientation: plantar;lateral   Location: Foot   Wound Number:    Ankle-Brachial Index:    Pulses:    Removal Indication and Assessment:    Wound Outcome:    (Retired) Wound Type:    (Retired) Wound Length (cm):    (Retired) Wound Width (cm):    (Retired) Depth (cm):    Wound Description (Comments):    Removal Indications:    Wound Image   05/24/24 0922   Wound WDL ex 05/24/24 0922   Dressing Appearance Intact;Moist drainage  05/24/24 0922   Drainage Amount Large 05/24/24 0922   Drainage Characteristics/Odor Serosanguineous 05/24/24 0922   Appearance Pink;Tendon;Moist 05/24/24 0922   Tissue loss description Full thickness 05/24/24 0922   Black (%), Wound Tissue Color 0 % 05/24/24 0922   Red (%), Wound Tissue Color 95 % 05/24/24 0922   Yellow (%), Wound Tissue Color 5 % 05/24/24 0922   Periwound Area Pale white;Moist 05/24/24 0922   Wound Edges Defined;Open 05/24/24 0922   Wound Length (cm) 4.7 cm 05/24/24 0922   Wound Width (cm) 2 cm 05/24/24 0922   Wound Depth (cm) 0.3 cm 05/24/24 0922   Wound Volume (cm^3) 2.82 cm^3 05/24/24 0922   Wound Surface Area (cm^2) 9.4 cm^2 05/24/24 0922   Tunneling (depth (cm)/location) 0 05/24/24 0922   Undermining (depth (cm)/location) 0 05/24/24 0922   Care Cleansed with:;Antimicrobial agent;Sterile normal saline 05/24/24 0922   Dressing Changed 05/24/24 0922   Periwound Care Moisture barrier applied 05/24/24 0922   Compression Two layer compression 05/24/24 0922   Off Loading Football dressing;Off loading shoe 05/24/24 0922   Dressing Change Due 05/28/24 05/24/24 0922           Plan:                Education about the prevention of limb loss.    Discussed wound healing cycle, skin integrity, ways to care for skin.Counseled patient on the effects of biomechanical pressure,  PVD, and blood glucose on healing. He verbalizes understanding that it can increase the chances of delayed healing and this prolonged exposure leads to infection or progression of infection which subsequently can result in loss of limb.    Left Foot Plantar wound is deeper at the center of wound bed, with tendon exposed and probing to bone.    Patient agreeable to surgical consult with Dr. Reyes to assess need for foot surgery.      Wound Debridement    Date/Time: 5/24/2024 8:00 AM    Performed by: Marivel Peterson DPM  Authorized by: Kristen, Marivel O., DPM      Wound Details:    Location:  Left foot    Location:  Left Plantar    Type of  "Debridement:  Excisional       Length (cm):  4.7       Area (sq cm):  9.4       Width (cm):  2       Percent Debrided (%):  100       Depth (cm):  0.3       Total Area Debrided (sq cm):  9.4    Depth of debridement:  Muscle/fascia/tendon    Tissue debrided:  Dermis, Subcutaneous, Epidermis and Fascia    Devitalized tissue debrided:  Callus and Fibrin    Instruments:  Curette  Bleeding:  Minimal  Hemostasis Achieved: Yes  Method Used:  Pressure  Patient tolerance:  Patient tolerated the procedure well with no immediate complications      Tissue pathology and/or culture taken     [] Yes      [x] No  Sharp debridement performed                   [x] Yes       [] No  Labs ordered     [] Yes       [x] No  Imaging ordered    [] Yes      [x] No    Orders Placed This Encounter   Procedures    Debridement     This order was created via procedure documentation     Standing Status:   Standing     Number of Occurrences:   1    Change dressing     Wound Dressing Orders      Dressing change frequency twice a week (prn nurse visit)  Remove old dressing  Cleanse or irrigate with: Hibiclens and Normal Saline    Moisturize dry skin on leg with Enlivaderm    Protect periwound with:  Gentian Violet hugging wound bed the [place] where custom foam will be applied  Primary dressing: WOUND BASE: ABX powder (pt's own, Tobramycin/Vancomycin) to wound bed   Secondary dressing: Custom Felt pad (cut to cover entire plantar foot with hole cut the size of wound bed to allow for drainage), Drawtex cut to size of hole in custom felt (2 layers), then Mextra 5" x 9" laid vertically then Mepilex non-border foam x 2 to off-load.    Compression/Off-load: Football (cast padding x 2 rolls, 1/2 roll fan folded to plantar for cushion). UrgoK2, size regular, 40 mmHg (ankle size 25 cm) CAM Boot on the affected foot     Change at Nurse Visit.        Follow up in about 4 days (around 5/28/2024) for Nurse Visit.       "

## 2024-05-24 NOTE — TELEPHONE ENCOUNTER
History  Chief Complaint   Patient presents with    Abdominal Pain     Lower abdominal cramping since yesterday. Went to the bathroom this morning and noticed blood and diarrhea in toilet. Was recently put on aspirin for TIA/stroke like symptoms. Reports mild nausea with no vomiting. Reports dizziness that started yesterday.      Jay is a 56 year old male with a history of Frank-en-Y gastric bypass, hypertension, recent event with strokelike symptoms, presenting with 1 day of intermittent lower abdominal cramping, 3 bowel movements with dark red blood and minimal stool, he had 2 such bowel movements seemingly increasing blood this evening prompting him to come to the emergency department.  He also describes an episode yesterday where he was mildly lightheaded and nauseous that resolved without any intervention, no chest pain or shortness of breath now or during that event.  No headache or vision changes, no neurological symptoms.  He denies any episodes of vomiting.  No recent infections or fevers.  Patient was started on aspirin after his recent TIA/strokelike event, does not take any other blood thinners.  Occasionally takes an Excedrin Migraine but does not take any NSAIDs otherwise.  No prior history of GI bleed.  Does not currently have any abdominal tenderness.      History provided by:  Patient and medical records   used: No        Prior to Admission Medications   Prescriptions Last Dose Informant Patient Reported? Taking?   aspirin 81 mg chewable tablet   No No   Sig: Chew 1 tablet (81 mg total) daily   atorvastatin (LIPITOR) 40 mg tablet   No No   Sig: Take 1 tablet (40 mg total) by mouth every evening   fluticasone (FLONASE) 50 mcg/act nasal spray   No No   Si spray into each nostril daily      Facility-Administered Medications: None       Past Medical History:   Diagnosis Date    Hypertension        Past Surgical History:   Procedure Laterality Date    BACK SURGERY      GASTRIC  Nurse called pt no answer LVM with contact information.      ----- Message from Stacey Lee MA sent at 3/31/2020  8:53 AM CDT -----  Regarding: FW: f/u      ----- Message -----  From: Antonio Dover MD  Sent: 3/30/2020   9:05 PM CDT  To: [facility] Clinical Staff  Subject: f/u                                              Can you set up f.u for mr rush in 1-2 weeks with Dr. Almonte? He was recently in hospital for COVID.    Thanks     BYPASS      KNEE SURGERY      SHOULDER SURGERY         Family History   Problem Relation Age of Onset    Heart attack Father     Diabetes Father      I have reviewed and agree with the history as documented.    E-Cigarette/Vaping    E-Cigarette Use Never User      E-Cigarette/Vaping Substances    Nicotine No     THC No     CBD No     Flavoring No     Other No     Unknown No      Social History     Tobacco Use    Smoking status: Never     Passive exposure: Never    Smokeless tobacco: Never   Vaping Use    Vaping status: Never Used   Substance Use Topics    Alcohol use: Not Currently    Drug use: Never        Review of Systems   Constitutional:  Negative for chills and fever.   HENT:  Negative for ear pain and sore throat.    Eyes:  Negative for pain and visual disturbance.   Respiratory:  Negative for cough and shortness of breath.    Cardiovascular:  Negative for chest pain and palpitations.   Gastrointestinal:  Positive for abdominal pain and blood in stool. Negative for nausea and vomiting.   Genitourinary:  Negative for dysuria and hematuria.   Musculoskeletal:  Negative for arthralgias and back pain.   Skin:  Negative for color change and rash.   Neurological:  Positive for light-headedness. Negative for seizures and syncope.   All other systems reviewed and are negative.      Physical Exam  ED Triage Vitals   Temperature Pulse Respirations Blood Pressure SpO2   05/24/24 0420 05/24/24 0414 05/24/24 0413 05/24/24 0413 05/24/24 0413   97.9 °F (36.6 °C) 94 18 110/71 95 %      Temp Source Heart Rate Source Patient Position - Orthostatic VS BP Location FiO2 (%)   05/24/24 0420 05/24/24 0413 05/24/24 0413 05/24/24 0413 --   Oral Monitor Sitting Right arm       Pain Score       05/24/24 0420       1             Orthostatic Vital Signs  Vitals:    05/24/24 0414 05/24/24 0422 05/24/24 0430 05/24/24 0522   BP:  110/71 117/73 117/61   Pulse: 94 88 96 82   Patient Position - Orthostatic VS:   Lying        Physical  Exam  Vitals and nursing note reviewed. Exam conducted with a chaperone present.   Constitutional:       General: He is not in acute distress.     Appearance: Normal appearance.   HENT:      Head: Normocephalic and atraumatic.      Right Ear: External ear normal.      Left Ear: External ear normal.      Mouth/Throat:      Mouth: Mucous membranes are moist.      Pharynx: Oropharynx is clear.   Eyes:      General: No scleral icterus.     Conjunctiva/sclera: Conjunctivae normal.   Cardiovascular:      Rate and Rhythm: Normal rate and regular rhythm.      Pulses: Normal pulses.      Heart sounds: Normal heart sounds.   Pulmonary:      Effort: Pulmonary effort is normal. No respiratory distress.      Breath sounds: Normal breath sounds.   Abdominal:      General: Abdomen is flat. There is no distension.      Palpations: Abdomen is soft.      Tenderness: There is no abdominal tenderness. There is no guarding or rebound.   Genitourinary:     Rectum: No tenderness, anal fissure, external hemorrhoid or internal hemorrhoid.      Comments: There was dark red blood during rectal examination  Musculoskeletal:         General: No tenderness or signs of injury.      Cervical back: Neck supple. No rigidity.   Skin:     General: Skin is warm.      Coloration: Skin is pale. Skin is not jaundiced.      Findings: No erythema or rash.   Neurological:      General: No focal deficit present.      Mental Status: He is alert. Mental status is at baseline.   Psychiatric:         Mood and Affect: Mood normal.         Behavior: Behavior normal.         ED Medications  Medications   pantoprazole (PROTONIX) 80 mg in sodium chloride 0.9 % 100 mL infusion (8 mg/hr Intravenous New Bag 5/24/24 0518)   sodium chloride 0.9 % bolus 1,000 mL (0 mL Intravenous Stopped 5/24/24 0526)   pantoprazole (PROTONIX) 80 mg in sodium chloride 0.9 % 100 mL IVPB (0 mg Intravenous Stopped 5/24/24 0505)   iohexol (OMNIPAQUE) 350 MG/ML injection (SINGLE-DOSE) 100 mL  (100 mL Intravenous Given 5/24/24 0443)       Diagnostic Studies  Results Reviewed       Procedure Component Value Units Date/Time    Protime-INR [[id number]]  (Abnormal) Collected: 05/24/24 0426    Lab Status: Final result Specimen: Blood from Arm, Left Updated: 05/24/24 0505     Protime 14.7 seconds      INR 1.09    APTT [[id number]]  (Normal) Collected: 05/24/24 0426    Lab Status: Final result Specimen: Blood from Arm, Left Updated: 05/24/24 0505     PTT 27 seconds     Comprehensive metabolic panel [[id number]]  (Abnormal) Collected: 05/24/24 0426    Lab Status: Final result Specimen: Blood from Arm, Left Updated: 05/24/24 0459     Sodium 140 mmol/L      Potassium 4.3 mmol/L      Chloride 108 mmol/L      CO2 25 mmol/L      ANION GAP 7 mmol/L      BUN 44 mg/dL      Creatinine 1.06 mg/dL      Glucose 155 mg/dL      Calcium 8.3 mg/dL      AST 14 U/L      ALT 8 U/L      Alkaline Phosphatase 75 U/L      Total Protein 5.5 g/dL      Albumin 3.6 g/dL      Total Bilirubin 0.91 mg/dL      eGFR 76 ml/min/1.73sq m     Narrative:      National Kid[employer] Disease Foundation guidelines for Chronic Kidney Disease (CKD):     Stage 1 with normal or high GFR (GFR > 90 mL/min/1.73 square meters)    Stage 2 Mild CKD (GFR = 60-89 mL/min/1.73 square meters)    Stage 3A Moderate CKD (GFR = 45-59 mL/min/1.73 square meters)    Stage 3B Moderate CKD (GFR = 30-44 mL/min/1.73 square meters)    Stage 4 Severe CKD (GFR = 15-29 mL/min/1.73 square meters)    Stage 5 End Stage CKD (GFR <15 mL/min/1.73 square meters)  Note: GFR calculation is accurate only with a steady state creatinine    Lipase [[id number]]  (Abnormal) Collected: 05/24/24 0426    Lab Status: Final result Specimen: Blood from Arm, Left Updated: 05/24/24 0459     Lipase 10 u/L     CBC and differential [[id number]]  (Abnormal) Collected: 05/24/24 0426    Lab Status: Final result Specimen: Blood from Arm, Left Updated: 05/24/24 0436     WBC 8.69 Thousand/uL      RBC 3.38 Million/uL       Hemoglobin 9.8 g/dL      Hematocrit 30.3 %      MCV 90 fL      MCH 29.0 pg      MCHC 32.3 g/dL      RDW 14.1 %      MPV 11.0 fL      Platelets 238 Thousands/uL      nRBC 0 /100 WBCs      Segmented % 74 %      Immature Grans % 0 %      Lymphocytes % 18 %      Monocytes % 6 %      Eosinophils Relative 1 %      Basophils Relative 1 %      Absolute Neutrophils 6.53 Thousands/µL      Absolute Immature Grans 0.03 Thousand/uL      Absolute Lymphocytes 1.53 Thousands/µL      Absolute Monocytes 0.52 Thousand/µL      Eosinophils Absolute 0.04 Thousand/µL      Basophils Absolute 0.04 Thousands/µL                    CT high volume bleeding scan abdomen pelvis   Final Result by Lia Soriano MD (05/24 0623)      No CT evidence of active high-volume gastrointestinal hemorrhage.      Prior gastric bypass. Small hiatal hernia. Colonic diverticulosis without evidence of acute diverticulitis. Normal appendix. No bowel obstruction.      The prostate is somewhat prominent and causes some mild nodular appearing indentation upon the base of the urinary bladder. Clinical and laboratory (PSA) correlation recommended.      Small pericardial effusion.      Other nonemergent findings as above.      The study was marked in EPIC for immediate notification.      Workstation performed: [id number]               Procedures  ECG 12 Lead Documentation Only    Date/Time: 5/24/2024 4:30 AM    Performed by: Matthieu Harvey DO  Authorized by: Matthieu Harvey DO    Indications / Diagnosis:  Lightheadedness  ECG reviewed by me, the ED Provider: yes    Patient location:  ED  Previous ECG:     Previous ECG:  Compared to current    Similarity:  No change  Interpretation:     Interpretation: normal    Rate:     ECG rate:  92    ECG rate assessment: normal    Rhythm:     Rhythm comment:  Sinus arrhythmia  Ectopy:     Ectopy: none    QRS:     QRS axis:  Normal    QRS intervals:  Normal  Conduction:     Conduction: normal    ST segments:      ST segments:  Normal  T waves:     T waves: normal          ED Course  ED Course as of 05/24/24 0659   Fri May 24, 2024   0443 Hemoglobin(!): 9.8  This is down from 13.7 two weeks ago                                       Medical Decision Making  Jay is a 56 year old male with a history of Frank-en-Y gastric bypass, hypertension, recent event with strokelike symptoms, presenting with 1 day of intermittent lower abdominal cramping, 3 bowel movements with dark red blood and minimal stool.     Patient was pale appearing, vital signs were unremarkable.  No abdominal tenderness on exam.  It appreciate dark red blood on rectal examination, no hemorrhoids, nontender during this exam.    Physical exam highly concerning for a lower GI bleed from unclear source.    Patient did have a drop in his hemoglobin level from 13.7 to 9.8 today.  CT bleeding scan did not show any acute abnormalities, there were several incidental findings including an abnormality of his prostate which I discussed with him and the need for follow-up, he was understanding.  Patient to be admitted for further evaluation and treatment of this acute GI bleed.    Problems Addressed:  Abdominal cramping: acute illness or injury  GI bleed: acute illness or injury  Nodular prostate: self-limited or minor problem  Pericardial effusion: self-limited or minor problem    Amount and/or Complexity of Data Reviewed  Labs: ordered. Decision-making details documented in ED Course.  Radiology: ordered.    Risk  Prescription drug management.  Decision regarding hospitalization.          Disposition  Final diagnoses:   GI bleed   Abdominal cramping   Nodular prostate   Pericardial effusion     Time reflects when diagnosis was documented in both MDM as applicable and the Disposition within this note       Time User Action Codes Description Comment    5/24/2024  4:48 AM Matthieu Harvey Add [K92.2] GI bleed     5/24/2024  4:48 AM Matthieu Harvey Add [R10.9]  Abdominal cramping     5/24/2024  6:48 AM Matthieu Harvey Add [N40.2] Nodular prostate     5/24/2024  6:55 AM Matthieu Harvey Add [I31.39] Pericardial effusion           ED Disposition       ED Disposition   Admit    Condition   Stable    Date/Time   Fri May 24, 2024  6:59 AM    Comment   Case was discussed with [facility] AP and the patient's admission status was agreed to be Admission Status: inpatient status to the service of   .               Follow-up Information    None         Patient's Medications   Discharge Prescriptions    No medications on file     No discharge procedures on file.    PDMP Review       None             ED Provider  Attending physically available and evaluated Jay Smith. I managed the patient along with the ED Attending.    Electronically Signed by           Matthieu Harvey DO  05/24/24 0613

## 2024-05-28 ENCOUNTER — HOSPITAL ENCOUNTER (OUTPATIENT)
Dept: WOUND CARE | Facility: HOSPITAL | Age: 54
Discharge: HOME | End: 2024-05-28
Payer: MEDICARE

## 2024-05-28 VITALS — HEART RATE: 104 BPM | SYSTOLIC BLOOD PRESSURE: 134 MMHG | TEMPERATURE: 98 F | DIASTOLIC BLOOD PRESSURE: 76 MMHG

## 2024-05-28 DIAGNOSIS — E11.49 TYPE II DIABETES MELLITUS WITH NEUROLOGICAL MANIFESTATIONS: Primary | ICD-10-CM

## 2024-05-28 DIAGNOSIS — E11.621 DIABETIC ULCER OF LEFT MIDFOOT ASSOCIATED WITH TYPE 2 DIABETES MELLITUS, WITH BONE INVOLVEMENT WITHOUT EVIDENCE OF NECROSIS: ICD-10-CM

## 2024-05-28 DIAGNOSIS — M86.10 ACUTE ON CHRONIC OSTEOMYELITIS: ICD-10-CM

## 2024-05-28 DIAGNOSIS — L97.426 DIABETIC ULCER OF LEFT MIDFOOT ASSOCIATED WITH TYPE 2 DIABETES MELLITUS, WITH BONE INVOLVEMENT WITHOUT EVIDENCE OF NECROSIS: ICD-10-CM

## 2024-05-28 DIAGNOSIS — M86.60 ACUTE ON CHRONIC OSTEOMYELITIS: ICD-10-CM

## 2024-05-28 PROCEDURE — 29581 APPL MULTLAYER CMPRN SYS LEG: CPT

## 2024-05-28 NOTE — PROGRESS NOTES
"[facility]  [address]   [place]  [id number]  Nurse Visit    Subjective:       Patient seen in clinic today. Patient ambulated to exam room using knee scooter to off-load left foot. Prescribed CAM boot on LLE and diabetic tennis shoe w/lift on Right Foot. He denies fever, chills, nausea, vomiting or diarrhea at present. No c/o pain or discomfort to wound bed at present. Dressing is intact to LLE, without strike through drainage. Dressing removed and wound cleansed by nurse. Patient reports taking oral Levaquin every other day as ordered, without complaint. Patient reminded to keep appointment for Infectious Disease scheduled on May 30. New superficial wound noted to Left Proximal Anterior Lower Leg, patient reports "injuring" the skin over the weekend. Collagen gel and border dressing applied to new wound after consulting with on duty MD. Dressing changed as ordered.      Assessment:          (Retired) Wound 04/08/22 1137 Diabetic Ulcer Left plantar;lateral Foot (Active)   04/08/22 1137    Pre-existing: Yes   Primary Wound Type: Diabetic ulc   Side: Left   Orientation: plantar;lateral   Location: Foot   Wound Number:    Ankle-Brachial Index:    Pulses:    Removal Indication and Assessment:    Wound Outcome:    (Retired) Wound Type:    (Retired) Wound Length (cm):    (Retired) Wound Width (cm):    (Retired) Depth (cm):    Wound Description (Comments):    Removal Indications:    Wound Image   05/28/24 1626   Wound WDL ex 05/28/24 1626   Dressing Appearance Intact;Moist drainage 05/28/24 1626   Drainage Amount Large 05/28/24 1626   Drainage Characteristics/Odor Serosanguineous 05/28/24 1626   Appearance Pink;Moist 05/28/24 1626   Tissue loss description Full thickness 05/28/24 1626   Black (%), Wound Tissue Color 0 % 05/28/24 1626   Red (%), Wound Tissue Color 100 % 05/28/24 1626   Yellow (%), Wound Tissue Color 0 % 05/28/24 1626   Periwound Area Pale white;Macerated 05/28/24 1626 " "  Wound Edges Defined;Open 05/28/24 1626   Care Cleansed with:;Antimicrobial agent;Sterile normal saline 05/28/24 1626   Dressing Applied;Other (comment);Hydrofiber;Absorptive Pad;Foam;Cast padding;Compression wrap 05/28/24 1626   Periwound Care Moisture barrier applied 05/28/24 1626   Compression Two layer compression 05/28/24 1626   Off Loading Football dressing;Off loading shoe 05/28/24 1626   Dressing Change Due 05/31/24 05/28/24 1626           Plan:     Wound Dressing Orders      Dressing change frequency twice a week (prn nurse visit)  Remove old dressing  Cleanse or irrigate with: Hibiclens and Normal Saline    Moisturize dry skin on leg with Enlivaderm    Left Proximal Lower Leg   Protect periwound with: Cavilon  Primary dressing: Cellerate to wound bed, cover with Mepilex silicone border    Left Plantar Foot  Protect periwound with:  Gentian Violet hugging wound bed the Cavilon where custom foam will be applied  Primary dressing: WOUND BASE: ABX powder (pt's own, Tobramycin/Vancomycin) to wound bed  Secondary dressing: Custom Felt pad (cut to cover entire plantar foot with hole cut the size of wound bed to allow for drainage), Drawtex cut to size of hole in custom felt (2 layers), then Mextra 5" x 9" laid vertically then Mepilex non-border foam x 2 to off-load.    Compression/Off-load: Football (cast padding x 2 rolls, 1/2 roll fan folded to plantar for cushion). UrgoK2, size regular, 40 mmHg (ankle size 25 cm) CAM Boot on the affected foot             Follow up in about 3 days (around 5/31/2024) for Wound Care.          "

## 2024-05-30 ENCOUNTER — LAB VISIT (OUTPATIENT)
Dept: LAB | Facility: HOSPITAL | Age: 54
End: 2024-05-30
Attending: INTERNAL MEDICINE
Payer: MEDICARE

## 2024-05-30 ENCOUNTER — OFFICE VISIT (OUTPATIENT)
Dept: INFECTIOUS DISEASES | Facility: CLINIC | Age: 54
End: 2024-05-30
Payer: MEDICARE

## 2024-05-30 ENCOUNTER — PATIENT MESSAGE (OUTPATIENT)
Dept: INFECTIOUS DISEASES | Facility: CLINIC | Age: 54
End: 2024-05-30

## 2024-05-30 VITALS
HEIGHT: 73 IN | BODY MASS INDEX: 29.16 KG/M2 | DIASTOLIC BLOOD PRESSURE: 80 MMHG | SYSTOLIC BLOOD PRESSURE: 121 MMHG | WEIGHT: 220 LBS | HEART RATE: 97 BPM | TEMPERATURE: 98 F

## 2024-05-30 DIAGNOSIS — M86.672 CHRONIC OSTEOMYELITIS OF LEFT FOOT: ICD-10-CM

## 2024-05-30 DIAGNOSIS — M86.672 CHRONIC OSTEOMYELITIS OF LEFT FOOT: Primary | ICD-10-CM

## 2024-05-30 LAB
ALBUMIN SERPL BCP-MCNC: 3.1 G/DL (ref 3.5–5.2)
ALP SERPL-CCNC: 218 U/L (ref 55–135)
ALT SERPL W/O P-5'-P-CCNC: 11 U/L (ref 10–44)
ANION GAP SERPL CALC-SCNC: 12 MMOL/L (ref 8–16)
AST SERPL-CCNC: 13 U/L (ref 10–40)
BILIRUB SERPL-MCNC: 0.4 MG/DL (ref 0.1–1)
BUN SERPL-MCNC: 12 MG/DL (ref 6–20)
CALCIUM SERPL-MCNC: 10.1 MG/DL (ref 8.7–10.5)
CHLORIDE SERPL-SCNC: 99 MMOL/L (ref 95–110)
CO2 SERPL-SCNC: 26 MMOL/L (ref 23–29)
CREAT SERPL-MCNC: 1.5 MG/DL (ref 0.5–1.4)
CRP SERPL-MCNC: 10.1 MG/L (ref 0–8.2)
ERYTHROCYTE [SEDIMENTATION RATE] IN BLOOD BY PHOTOMETRIC METHOD: >120 MM/HR (ref 0–23)
EST. GFR  (NO RACE VARIABLE): 54.6 ML/MIN/1.73 M^2
GLUCOSE SERPL-MCNC: 493 MG/DL (ref 70–110)
POTASSIUM SERPL-SCNC: 3.8 MMOL/L (ref 3.5–5.1)
PROT SERPL-MCNC: 8.5 G/DL (ref 6–8.4)
SODIUM SERPL-SCNC: 137 MMOL/L (ref 136–145)

## 2024-05-30 PROCEDURE — 99999 PR PBB SHADOW E&M-EST. PATIENT-LVL III: CPT | Mod: PBBFAC,,, | Performed by: INTERNAL MEDICINE

## 2024-05-30 PROCEDURE — 99215 OFFICE O/P EST HI 40 MIN: CPT | Mod: S$PBB,,, | Performed by: INTERNAL MEDICINE

## 2024-05-30 PROCEDURE — 99213 OFFICE O/P EST LOW 20 MIN: CPT | Mod: PBBFAC | Performed by: INTERNAL MEDICINE

## 2024-05-30 PROCEDURE — 86140 C-REACTIVE PROTEIN: CPT | Performed by: INTERNAL MEDICINE

## 2024-05-30 PROCEDURE — 85652 RBC SED RATE AUTOMATED: CPT | Performed by: INTERNAL MEDICINE

## 2024-05-30 PROCEDURE — 80053 COMPREHEN METABOLIC PANEL: CPT | Performed by: INTERNAL MEDICINE

## 2024-05-30 PROCEDURE — 36415 COLL VENOUS BLD VENIPUNCTURE: CPT | Performed by: INTERNAL MEDICINE

## 2024-05-30 NOTE — PROGRESS NOTES
Infectious Disease Clinic Note  09/11/23      Subjective:       Patient ID: Indio Ryder Jr. is a 53 y.o. male being seen for a followup visit.    Chief Complaint: Follow-up    HPI     Mr. Ryder is a 51 y/o M with hx DM and numerous episodes of foot osteo presents for f/up of Lt foot chronic osteomyelitis.    Interval hx:  Last seen on 10/11/2023 by Damien Richey. He was prescribed a 6 month course of augmentin for chronic osteo. Pt reports he wasn't taking amox-clav  every day. Not sure when he took it last. Bone Biopsy 5/17/24 revealed chronic osteomyelitis; bone cx grew enterococcus faecalis.    A previous wound cx 4/2/24 grew pseudomonas and klebsiella. On 5/17 he was prescribed levofloxacin 750mg - taking every other day.   Cr 1.2 on recent labs. Denies signs or symptoms of systemic infection.            Family History   Adopted: Yes   Problem Relation Name Age of Onset    Hypertension Mother      Cancer Mother          breast    Diabetes Mother      Hypertension Father      Cataracts Father      Heart disease Father          mi    Diabetes Sister Michelle     No Known Problems Brother      Heart disease Sister Manuela         MI    No Known Problems Sister      Hypertension Maternal Aunt      No Known Problems Maternal Uncle      No Known Problems Paternal Aunt      No Known Problems Paternal Uncle      No Known Problems Maternal Grandmother      No Known Problems Maternal Grandfather      No Known Problems Paternal Grandmother      No Known Problems Paternal Grandfather      Amblyopia Neg Hx      Blindness Neg Hx      Glaucoma Neg Hx      Macular degeneration Neg Hx      Retinal detachment Neg Hx      Strabismus Neg Hx      Stroke Neg Hx      Thyroid disease Neg Hx       Social History     Socioeconomic History    Marital status: Single    Number of children: 0   Occupational History    Occupation: Retired     Employer: [employer]   Tobacco Use    Smoking status: Never    Smokeless tobacco:  Never   Substance and Sexual Activity    Alcohol use: No    Drug use: No    Sexual activity: Yes     Partners: Female     Birth control/protection: Condom     Social Determinants of Health     Financial Resource Strain: High Risk (7/13/2023)    Overall Financial Resource Strain (CARDIA)     Difficulty of Paying Living Expenses: Very hard   Food Insecurity: No Food Insecurity (7/13/2023)    Hunger Vital Sign     Worried About Running Out of Food in the Last Year: Never true     Ran Out of Food in the Last Year: Never true   Transportation Needs: No Transportation Needs (7/13/2023)    PRAPARE - Transportation     Lack of Transportation (Medical): No     Lack of Transportation (Non-Medical): No   Physical Activity: Inactive (7/13/2023)    Exercise Vital Sign     Days of Exercise per Week: 0 days     Minutes of Exercise per Session: 0 min   Stress: No Stress Concern Present (7/13/2023)    [employer] [place] [employer] - Occupational Stress Questionnaire     Feeling of Stress : Not at all   Housing Stability: Low Risk  (7/13/2023)    Housing Stability Vital Sign     Unable to Pay for Housing in the Last Year: No     Number of Places Lived in the Last Year: 1     Unstable Housing in the Last Year: No     Past Surgical History:   Procedure Laterality Date    COLONOSCOPY Right 1/19/2022    Procedure: COLONOSCOPY;  Surgeon: Tj Haile MD;  Location: [facility] (4TH FLR);  Service: Endoscopy;  Laterality: Right;  previous order un-usable/poor prep 1/18/22  RAPID COVID test arrival 12:20  instructions handed to pt- sm    COLONOSCOPY N/A 3/21/2022    Procedure: COLONOSCOPY;  Surgeon: Sheng Haque MD;  Location: [facility] (2ND FLR);  Service: Endoscopy;  Laterality: N/A;  Colonoscopy with AES for hepatix flexure polyp removal, 2nd floor  Pt is fully vaccinated-DS  Pt prescribed Plavix by Dr. Stahl in Jan. 2022, however pt states that he never started taking it-DS  3/16/22-Instructions sent via portal-DS     COLONOSCOPY N/A 9/13/2023    Procedure: COLONOSCOPY;  Surgeon: Erik Stout MD;  Location: [facility] (4TH FLR);  Service: Colon and Rectal;  Laterality: N/A;  Referred by timur Saunders, [facility], portal -ml    DEBRIDEMENT OF FOOT Left 7/14/2019    Procedure: DEBRIDEMENT, FOOT, with left 5th ray amputation;  Surgeon: Sis Hickman DPM;  Location: [facility] OR 2ND FLR;  Service: Podiatry;  Laterality: Left;    DEBRIDEMENT OF FOOT Left 7/17/2019    Procedure: DEBRIDEMENT, FOOT with leftt 5th ray partial amputation with Neox Graft;  Surgeon: Mai Burrell DPM;  Location: [facility] OR 2ND FLR;  Service: Podiatry;  Laterality: Left;  t    DEBRIDEMENT OF FOOT Bilateral 4/29/2021    Procedure: DEBRIDEMENT, FOOT;  Surgeon: Mai Burrell DPM;  Location: [facility] OR 1ST FLR;  Service: Podiatry;  Laterality: Bilateral;  Graft application    DEBRIDEMENT OF FOOT Left 7/31/2023    Procedure: DEBRIDEMENT, FOOT;  Surgeon: Marivel Peterson DPM;  Location: [facility] OR 2ND FLR;  Service: Podiatry;  Laterality: Left;    TOE AMPUTATION Right 06/05/2015    TOE AMPUTATION Right 01/19/2017    XI ROBOTIC COLECTOMY, RIGHT N/A 4/25/2022    Procedure: XI ROBOTIC COLECTOMY, RIGHT (lithotomy, eras low);  Surgeon: Erik Stout MD;  Location: [facility] OR 2ND FLR;  Service: Colon and Rectal;  Laterality: N/A;  Paruch to Goodwin    XI ROBOTIC COLECTOMY, RIGHT  4/25/2022    Procedure: ;  Surgeon: Erik Stout MD;  Location: [facility] OR 2ND FLR;  Service: Colon and Rectal;;       Patient's Medications   New Prescriptions    No medications on file   Previous Medications    AMOXICILLIN-CLAVULANATE 875-125MG (AUGMENTIN) 875-125 MG PER TABLET    Take 1 tablet by mouth 2 (two) times a day.    ATORVASTATIN (LIPITOR) 80 MG TABLET    Take 1 tablet (80 mg total) by mouth once daily.    EMPAGLIFLOZIN (JARDIANCE) 25 MG TABLET    Take 1 tablet (25 mg total) by mouth once daily.    INSULIN ASPART U-100 (NOVOLOG) 100 UNIT/ML (3 ML) INPN PEN     "Inject if sugar is > 180 up to 4x a day , 180-230+2, 231-280+4, 281-330+6, 331-380+8, >380+10. Max daily 40 units    INSULIN GLARGINE U-300 CONC (TOUJEO MAX U-300 SOLOSTAR) 300 UNIT/ML (3 ML) INSULIN PEN    Inject 26 to 32 units under the skin at night    LEVOFLOXACIN (LEVAQUIN) 750 MG TABLET    Take 1 tablet (750 mg total) by mouth every other day.    ONETOUCH DELICA LANCETS 33 GAUGE [facility]        ONETOUCH ULTRA2 KIT        PEN NEEDLE, DIABETIC (BD SASCHA 2ND GEN PEN NEEDLE) 32 GAUGE X 5/32" NDLE    Use 4 times a day   Modified Medications    No medications on file   Discontinued Medications    No medications on file       Patient Active Problem List    Diagnosis Date Noted    Blister (nonthermal), left foot, sequela 02/16/2024    Blister (nonthermal), left foot, initial encounter 01/26/2024    Equinus deformity of both feet 11/17/2023    H/O amputation of lesser toe, right 11/17/2023    Right great toe amputee 11/17/2023    Chronic osteomyelitis of left foot 10/20/2023    Type II diabetes mellitus with neurological manifestations 09/29/2023    Eversion deformity of foot, left 09/15/2023    Hx of amputation of lesser toe, left 09/15/2023    Pre-ulcerative calluses 06/30/2023    Chronic osteomyelitis of right foot 12/09/2022    Diabetic ulcer of right foot associated with type 2 diabetes mellitus, with fat layer exposed 04/26/2022    History of colon cancer 04/12/2022    Acute on chronic osteomyelitis 04/29/2021    Diabetic ulcer of left foot 04/28/2021    Septic arthritis of left foot 04/28/2021    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 edema, with long-term current use of insulin 03/23/2021    Chronic left shoulder pain 07/30/2020    Allergic reaction due to antibacterial drug 04/05/2020    Subacute osteomyelitis of left foot 03/11/2020    Hypertensive retinopathy, bilateral 01/28/2020    Diabetic ulcer of left midfoot associated with type 2 " "diabetes mellitus, with bone involvement without evidence of necrosis 08/02/2019    Diabetic ulcer of right midfoot associated with type 2 diabetes mellitus, with necrosis of bone 07/12/2019    Neck pain 11/08/2017    Hypokalemia 05/30/2017    Actinomyces infection 01/17/2017     Right foot      Type 2 diabetes mellitus with hyperglycemia, with long-term current use of insulin 05/03/2016    Mixed hyperlipidemia 08/12/2014    Essential hypertension 06/06/2013       Review of Systems   Review of Systems   Constitutional:  Negative for chills, fever and malaise/fatigue.   Respiratory:  Negative for cough and shortness of breath.    Cardiovascular:  Negative for chest pain and orthopnea.   Gastrointestinal:  Negative for abdominal pain, diarrhea and vomiting.   Genitourinary:  Negative for dysuria.   Musculoskeletal:  Negative for back pain and myalgias.        L foot wound   Neurological:  Negative for sensory change and weakness.           Objective:      /80 (BP Location: Left arm)   Pulse 97   Temp 97.6 °F (36.4 °C) (Oral)   Ht 6' 1" (1.854 m)   Wt 99.8 kg (220 lb 0.3 oz)   BMI 29.03 kg/m²   Estimated body mass index is 29.03 kg/m² as calculated from the following:    Height as of this encounter: 6' 1" (1.854 m).    Weight as of this encounter: 99.8 kg (220 lb 0.3 oz).    Physical Exam  Constitutional:       General: He is not in acute distress.     Appearance: Normal appearance. He is not ill-appearing, toxic-appearing or diaphoretic.   HENT:      Head: Normocephalic and atraumatic.   Neurological:      Mental Status: He is alert.   Psychiatric:         Mood and Affect: Mood normal.         Behavior: Behavior normal.         Thought Content: Thought content normal.         Judgment: Judgment normal.           Assessment:         1. Chronic osteomyelitis of left foot  US Lower Extrem Arteries Left with ALISSA (xpd)    Comprehensive Metabolic Panel    C-REACTIVE PROTEIN    Sedimentation rate            Plan: "         Diabetic ulcer of left midfoot associated with type 2 diabetes mellitus, with fat layer exposed  54y/o M with hx of LLE DM foot ulcers c/b osteo s/p numerous courses of IV and PO antibiotics. Last seen in ID clinic in 10/2023.  At the time were suspecting recurrent e faecalis due to chronic osteo and plan was for 6 mths  Augmentin 875mg po bid. New bone cx from 5/17/24 again growing e faecalis with path neg for acute osteo. Showed chronic osteo. Pt admits to non adherence to longterm abx.     Plan:   Stop levaquin  Recheck CMP to monitor for abx toxicity and assess renal function  Baseline crp and esr  Resume augmentin to target e faecalis  US vasc arterial with savannah to ensure pt has good blood flow  Discussed with patient that if not improving on oral abx may need picc line placement for IV abx.  Continue local woundcare and f/u with podiatry    Rtc in 2-3 wks  Maria Dolores Tan MD  Infectious Disease     Total professional time spent for the encounter: 40 minutes  Time was spent preparing to see the patient, reviewing results of prior testing, obtaining and/or reviewing separately obtained history, performing a medically appropriate examination and interview, counseling and educating the patient/family, ordering medications/tests/procedures, referring and communicating with other health care professionals, documenting clinical information in the electronic health record, and independently interpreting results.

## 2024-05-31 ENCOUNTER — TELEPHONE (OUTPATIENT)
Dept: INFECTIOUS DISEASES | Facility: CLINIC | Age: 54
End: 2024-05-31
Payer: MEDICARE

## 2024-05-31 ENCOUNTER — PATIENT MESSAGE (OUTPATIENT)
Dept: INFECTIOUS DISEASES | Facility: CLINIC | Age: 54
End: 2024-05-31
Payer: MEDICARE

## 2024-05-31 ENCOUNTER — HOSPITAL ENCOUNTER (OUTPATIENT)
Dept: WOUND CARE | Facility: HOSPITAL | Age: 54
Discharge: HOME | End: 2024-05-31
Attending: PODIATRIST
Payer: MEDICARE

## 2024-05-31 VITALS — SYSTOLIC BLOOD PRESSURE: 149 MMHG | DIASTOLIC BLOOD PRESSURE: 93 MMHG | HEART RATE: 94 BPM | TEMPERATURE: 97 F

## 2024-05-31 DIAGNOSIS — E11.621 DIABETIC ULCER OF LEFT MIDFOOT ASSOCIATED WITH TYPE 2 DIABETES MELLITUS, WITH BONE INVOLVEMENT WITHOUT EVIDENCE OF NECROSIS: ICD-10-CM

## 2024-05-31 DIAGNOSIS — M86.10 ACUTE ON CHRONIC OSTEOMYELITIS: ICD-10-CM

## 2024-05-31 DIAGNOSIS — E11.49 TYPE II DIABETES MELLITUS WITH NEUROLOGICAL MANIFESTATIONS: Primary | ICD-10-CM

## 2024-05-31 DIAGNOSIS — N17.9 AKI (ACUTE KIDNEY INJURY): Primary | ICD-10-CM

## 2024-05-31 DIAGNOSIS — L97.426 DIABETIC ULCER OF LEFT MIDFOOT ASSOCIATED WITH TYPE 2 DIABETES MELLITUS, WITH BONE INVOLVEMENT WITHOUT EVIDENCE OF NECROSIS: ICD-10-CM

## 2024-05-31 DIAGNOSIS — M86.60 ACUTE ON CHRONIC OSTEOMYELITIS: ICD-10-CM

## 2024-05-31 PROCEDURE — 11042 DBRDMT SUBQ TIS 1ST 20SQCM/<: CPT | Mod: ,,, | Performed by: PODIATRIST

## 2024-05-31 PROCEDURE — 99499 UNLISTED E&M SERVICE: CPT | Mod: ,,, | Performed by: PODIATRIST

## 2024-05-31 PROCEDURE — 11042 DBRDMT SUBQ TIS 1ST 20SQCM/<: CPT | Performed by: PODIATRIST

## 2024-05-31 NOTE — PROGRESS NOTES
[facility]                Progress Note    Subjective:       Patient ID: Indio Ryder Jr. is a 53 y.o. male.    Chief Complaint: Wound Check, Dressing Change, and Diabetic Foot Ulcer    Follow up wound care visit. Patient ambulated to exam room using knee scooter to off-load left foot with prescribed CAM boot on LLE and diabetic tennis shoe w/lift on right foot. He denies fever, chills, nausea, vomiting or diarrhea at present. No c/o pain or discomfort to wound bed at present. He reports stopping Levaquin, and restarting Augmentin 875/125 mg PO BID as per ID order. Dressing is intact to LLE, with a large amount of drainage noted to 1st layer of compression dressing. Patient denies doing more activity or walking without use of scooter for LLE.         Review of Systems   Constitutional:  Negative for appetite change, chills, fatigue and fever.   HENT:  Negative for hearing loss.    Eyes:  Negative for photophobia and visual disturbance.   Respiratory:  Negative for cough, chest tightness, shortness of breath and wheezing.    Cardiovascular:  Positive for leg swelling. Negative for chest pain and palpitations.   Gastrointestinal:  Negative for constipation, diarrhea, nausea and vomiting.   Endocrine: Negative for cold intolerance and heat intolerance.   Genitourinary:  Negative for flank pain.   Musculoskeletal:  Positive for gait problem and myalgias. Negative for neck pain and neck stiffness.   Skin:  Positive for wound. Negative for color change.   Neurological:  Positive for numbness. Negative for weakness, light-headedness and headaches.        + paresthesia    Psychiatric/Behavioral:  Negative for sleep disturbance.          Objective:        Physical Exam  Constitutional:       Appearance: He is well-developed.   Cardiovascular:      Comments: Dorsalis pedis and posterior tibial pulses are palpable Skin is atrophic, slightly hyperpigmented, and mildly  edematous      Musculoskeletal:         General: No tenderness. Normal range of motion.      Comments: Muscle strength is 5/5 in all groups bilaterally.    S/p partial 5th ray amp b/l    S/p hallux amp right    Fat pad atrophy to heels and met heads bilateral       Skin:     General: Skin is warm and dry.      Coloration: Skin is not jaundiced, mottled or sallow.      Findings: Wound (see below) present. No abscess or ecchymosis.      Comments:        Neurological:      Mental Status: He is alert and oriented to person, place, and time.      Comments: Ainsworth-Nenita 5.07 monofilamant testing is diminished Kenji feet. Sharp/dull sensation diminished Bilaterally. Light touch absent Bilaterally.       Psychiatric:         Behavior: Behavior normal.         Vitals:    05/31/24 0805   BP: (!) 149/93   Pulse: 94   Temp: 97.3 °F (36.3 °C)       Assessment:           ICD-10-CM ICD-9-CM   1. Type II diabetes mellitus with neurological manifestations  E11.49 250.60   2. Diabetic ulcer of left midfoot associated with type 2 diabetes mellitus, with bone involvement without evidence of necrosis  E11.621 250.80    L97.426 707.14   3. Acute on chronic osteomyelitis  M86.10 730.00    M86.60 730.10            (Retired) Wound 04/08/22 1137 Diabetic Ulcer Left plantar;lateral Foot (Active)   04/08/22 1137    Pre-existing: Yes   Primary Wound Type: Diabetic ulc   Side: Left   Orientation: plantar;lateral   Location: Foot   Wound Number:    Ankle-Brachial Index:    Pulses:    Removal Indication and Assessment:    Wound Outcome:    (Retired) Wound Type:    (Retired) Wound Length (cm):    (Retired) Wound Width (cm):    (Retired) Depth (cm):    Wound Description (Comments):    Removal Indications:    Wound Image    05/31/24 1623   Wound WDL ex 05/31/24 1623   Dressing Appearance Intact;Moist drainage;Area marked 05/31/24 1623   Drainage Amount Large 05/31/24 1623   Drainage Characteristics/Odor Serosanguineous 05/31/24 1623   Appearance  Red;Pink;Moist 05/31/24 1623   Tissue loss description Full thickness 05/31/24 1623   Black (%), Wound Tissue Color 0 % 05/31/24 1623   Red (%), Wound Tissue Color 100 % 05/31/24 1623   Yellow (%), Wound Tissue Color 0 % 05/31/24 1623   Periwound Area Pale white;Moist;Macerated 05/31/24 1623   Wound Edges Defined;Open 05/31/24 1623   Wound Length (cm) 6.1 cm 05/31/24 1623   Wound Width (cm) 3.2 cm 05/31/24 1623   Wound Depth (cm) 0.4 cm 05/31/24 1623   Wound Volume (cm^3) 7.808 cm^3 05/31/24 1623   Wound Surface Area (cm^2) 19.52 cm^2 05/31/24 1623   Tunneling (depth (cm)/location) 0 05/31/24 1623   Undermining (depth (cm)/location) 0 05/31/24 1623   Care Cleansed with:;Antimicrobial agent;Sterile normal saline;Debrided 05/31/24 1623   Dressing Changed 05/31/24 1623   Periwound Care Moisture barrier applied 05/31/24 1623   Compression Two layer compression 05/31/24 1623   Off Loading Football dressing;Off loading shoe 05/31/24 1623   Dressing Change Due 06/04/24 05/31/24 1623           Plan:                Education about the prevention of limb loss.    Discussed wound healing cycle, skin integrity, ways to care for skin.Counseled patient on the effects of biomechanical pressure,  PVD, and blood glucose on healing. He verbalizes understanding that it can increase the chances of delayed healing and this prolonged exposure leads to infection or progression of infection which subsequently can result in loss of limb.    Abrasion noted at nurse visit to Left Proximal Anterior Lower Leg appears to be healing well. Blister (2.3 cm diameter) noted at 6 o'clock position below wound bed that is intact (see media).     Left Foot Plantar wound is deeper at the center of wound bed, with muscle belly and tendon exposed and probing to bone. Stagnant     Patient agreeable to surgical consult with Dr. Reyes to assess need for foot surgery.      Wound Debridement    Date/Time: 5/31/2024 8:00 AM    Performed by: Marivel Peterson  "CLEM  Authorized by: Marivel Peterson DPM      Wound Details:    Location:  Left foot    Location:  Left Plantar    Type of Debridement:  Excisional       Length (cm):  6.1       Area (sq cm):  19.52       Width (cm):  3.2       Percent Debrided (%):  100       Depth (cm):  0.4       Total Area Debrided (sq cm):  19.52    Depth of debridement:  Subcutaneous tissue    Tissue debrided:  Dermis, Epidermis and Subcutaneous    Devitalized tissue debrided:  Callus and Fibrin    Instruments:  Curette  Bleeding:  Minimal  Hemostasis Achieved: Yes  Method Used:  Pressure  Patient tolerance:  Patient tolerated the procedure well with no immediate complications      Tissue pathology and/or culture taken     [] Yes      [x] No  Sharp debridement performed                   [x] Yes       [] No  Labs ordered     [] Yes       [x] No  Imaging ordered    [] Yes      [x] No    Orders Placed This Encounter   Procedures    Debridement     This order was created via procedure documentation     Standing Status:   Standing     Number of Occurrences:   1    Change dressing     Wound Dressing Orders       Dressing change frequency twice a week (prn nurse visit)   Remove old dressing   Cleanse or irrigate with: Hibiclens and Normal Saline     Moisturize dry skin on leg with Enlivaderm     Protect periwound with:  Gentian Violet hugging wound bed then Elidia's Ring around wound bed, Cavilon where custom pad will be applied to plantar foot  Primary dressing: WOUND BASE: ABX powder (pt's own, Tobramycin/Vancomycin) to wound bed then Cutimed (cut to size of wound bed)  Secondary dressing: Custom Felt pad and Custom Foam pad(both cut to cover entire plantar foot with hole cut the size of wound bed to allow for drainage; Felt applied to skin then Foam pad), Kaltostat inside of felt pad wicking to Mextra 5" x 9" laid vertically.    Compression/Off-load: Football (cast padding x 2 rolls, 1/2 roll fan folded to plantar for cushion). UrgoK2, size " regular, 40 mmHg (ankle size 25 cm) CAM Boot on the affected foot      Change at Nurse Visit.        Follow up in about 4 days (around 6/4/2024) for Nurse Visit.

## 2024-05-31 NOTE — TELEPHONE ENCOUNTER
Left pt a voice mail and pbsi message letting him know  would like for me to schedule his repeat lab today 5/31 or on Monday 6/3.

## 2024-05-31 NOTE — TELEPHONE ENCOUNTER
Called patient 5/30 and discussed abnormal lab results. Advised to present to ED if presents with signs of worsening renal failure such as decreased urine output, sob, vol overload. Recommend avoiding nsaids, increasing fluids, controlling blood glucose. Admits to having run out of insulin, but was planning on restarting today. Will repeat bmp.    Composite Filling    Debe Comas presents for composite filling  PMH reviewed, no changes  Pt presents with mom    No anaesthesia as caries were limited to enamel     Dry shield used     Small PRR's placed on tooth #15 O and #18O  The preps were Etched with 37% H2PO4, rinse, dry  Applied Adhese with 20 second scrub once, gentle air dry and light cured for 10s  Restored with flowable shade A2 and light cured  Etch with 37% H2PO4, rinse, dry  Applied Adhese with 20 second scrub once, gentle air dry and light cured for 10s over pits and fisures of teeth 13-20, sealrite placed in all pits and fisures and light cured for 20 sec each    Refined with finishing burs, polished with enhance point  Verified occlusion   Pt left satisfied      NV: recall and seal right side

## 2024-06-03 ENCOUNTER — PATIENT MESSAGE (OUTPATIENT)
Dept: INFECTIOUS DISEASES | Facility: CLINIC | Age: 54
End: 2024-06-03
Payer: MEDICARE

## 2024-06-03 ENCOUNTER — LAB VISIT (OUTPATIENT)
Dept: LAB | Facility: HOSPITAL | Age: 54
End: 2024-06-03
Attending: INTERNAL MEDICINE
Payer: MEDICARE

## 2024-06-03 DIAGNOSIS — N17.9 AKI (ACUTE KIDNEY INJURY): ICD-10-CM

## 2024-06-03 LAB
ANION GAP SERPL CALC-SCNC: 9 MMOL/L (ref 8–16)
BUN SERPL-MCNC: 15 MG/DL (ref 6–20)
CALCIUM SERPL-MCNC: 9.8 MG/DL (ref 8.7–10.5)
CHLORIDE SERPL-SCNC: 103 MMOL/L (ref 95–110)
CO2 SERPL-SCNC: 27 MMOL/L (ref 23–29)
CREAT SERPL-MCNC: 1.3 MG/DL (ref 0.5–1.4)
EST. GFR  (NO RACE VARIABLE): >60 ML/MIN/1.73 M^2
GLUCOSE SERPL-MCNC: 289 MG/DL (ref 70–110)
POTASSIUM SERPL-SCNC: 4 MMOL/L (ref 3.5–5.1)
SODIUM SERPL-SCNC: 139 MMOL/L (ref 136–145)

## 2024-06-03 PROCEDURE — 80048 BASIC METABOLIC PNL TOTAL CA: CPT | Performed by: INTERNAL MEDICINE

## 2024-06-03 PROCEDURE — 36415 COLL VENOUS BLD VENIPUNCTURE: CPT | Performed by: INTERNAL MEDICINE

## 2024-06-04 ENCOUNTER — HOSPITAL ENCOUNTER (OUTPATIENT)
Dept: WOUND CARE | Facility: HOSPITAL | Age: 54
Discharge: HOME | End: 2024-06-04
Attending: PODIATRIST
Payer: MEDICARE

## 2024-06-04 VITALS — DIASTOLIC BLOOD PRESSURE: 86 MMHG | SYSTOLIC BLOOD PRESSURE: 143 MMHG | HEART RATE: 86 BPM | TEMPERATURE: 98 F

## 2024-06-04 DIAGNOSIS — E11.621 DIABETIC ULCER OF LEFT MIDFOOT ASSOCIATED WITH TYPE 2 DIABETES MELLITUS, WITH BONE INVOLVEMENT WITHOUT EVIDENCE OF NECROSIS: ICD-10-CM

## 2024-06-04 DIAGNOSIS — E11.49 TYPE II DIABETES MELLITUS WITH NEUROLOGICAL MANIFESTATIONS: Primary | ICD-10-CM

## 2024-06-04 DIAGNOSIS — L97.426 DIABETIC ULCER OF LEFT MIDFOOT ASSOCIATED WITH TYPE 2 DIABETES MELLITUS, WITH BONE INVOLVEMENT WITHOUT EVIDENCE OF NECROSIS: ICD-10-CM

## 2024-06-04 PROCEDURE — 29581 APPL MULTLAYER CMPRN SYS LEG: CPT

## 2024-06-04 NOTE — PROGRESS NOTES
[facility]  [address]  [address]  Nurse Visit    Subjective:       Patient seen in clinic today. Patient ambulated to exam room using knee scooter to off-load left foot with prescribed CAM boot on LLE and diabetic tennis shoe w/lift on right foot. He denies fever, chills, nausea, vomiting or diarrhea at present. No c/o pain or discomfort to wound bed at present. He reports taking Augmentin 875/125 mg PO BID as per order, without complaint. Dressing is intact to LLE, with a large amount of strike through drainage noted to outer layer of compression dressing. Dressing removed and wound washed by nurse. Dressing changed as ordered.      Assessment:          (Retired) Wound 04/08/22 1137 Diabetic Ulcer Left plantar;lateral Foot (Active)   04/08/22 1137    Pre-existing: Yes   Primary Wound Type: Diabetic ulc   Side: Left   Orientation: plantar;lateral   Location: Foot   Wound Number:    Ankle-Brachial Index:    Pulses:    Removal Indication and Assessment:    Wound Outcome:    (Retired) Wound Type:    (Retired) Wound Length (cm):    (Retired) Wound Width (cm):    (Retired) Depth (cm):    Wound Description (Comments):    Removal Indications:    Wound WDL ex 06/04/24 1722   Dressing Appearance Intact;Moist drainage;Area marked 06/04/24 1722   Drainage Amount Copious 06/04/24 1722   Drainage Characteristics/Odor Serosanguineous;No odor 06/04/24 1722   Appearance Pink;Red;Tendon;Moist 06/04/24 1722   Tissue loss description Full thickness 06/04/24 1722   Black (%), Wound Tissue Color 0 % 06/04/24 1722   Red (%), Wound Tissue Color 90 % 06/04/24 1722   Yellow (%), Wound Tissue Color 10 % 06/04/24 1722   Periwound Area Pale white;Macerated 06/04/24 1722   Wound Edges Open 06/04/24 1722   Care Cleansed with:;Antimicrobial agent;Sterile normal saline;Wound cleanser 06/04/24 1722   Dressing Changed;Non-adherent;Calcium alginate;Absorptive Pad;Foam;Cast padding;Compression wrap 06/04/24  "1722   Periwound Care Moisture barrier applied 06/04/24 1722   Compression Two layer compression 06/04/24 1722   Off Loading Football dressing;Off loading shoe 06/04/24 1722   Dressing Change Due 06/07/24 06/04/24 1722           Plan:     Wound Dressing Orders      Dressing change frequency twice a week (prn nurse visit)  Remove old dressing  Cleanse or irrigate with: Hibiclens and Normal Saline then Dakin's soak for 10 minutes    Moisturize dry skin on leg with Enlivaderm    Protect periwound with:  Gentian Violet hugging wound bed, Cavilon where custom pad will be applied to plantar foot  Primary dressing: WOUND BASE: ABX powder (pt's own, Tobramycin/Vancomycin) to wound bed then Cutimed (cut to size of wound bed)  Secondary dressing: Custom Felt pad and Custom Foam pad(both cut to cover entire plantar foot with hole cut the size of wound bed to allow for drainage; Felt applied to skin then Foam pad), Kaltostat inside of felt pad wicking to Mextra 5" x 9" laid vertically.  Optifoam Ag to help with off-loading   Compression/Off-load: Football (cast padding x 2 rolls). UrgoK2, size regular, 40 mmHg (ankle size 25 cm) CAM Boot on the affected foot          Follow up in about 3 days (around 6/7/2024) for Wound Care.        "

## 2024-06-07 ENCOUNTER — HOSPITAL ENCOUNTER (OUTPATIENT)
Dept: WOUND CARE | Facility: HOSPITAL | Age: 54
Discharge: HOME | End: 2024-06-07
Attending: PODIATRIST
Payer: MEDICARE

## 2024-06-07 VITALS
HEART RATE: 70 BPM | SYSTOLIC BLOOD PRESSURE: 143 MMHG | TEMPERATURE: 98 F | DIASTOLIC BLOOD PRESSURE: 72 MMHG | RESPIRATION RATE: 18 BRPM

## 2024-06-07 DIAGNOSIS — L97.426 DIABETIC ULCER OF LEFT MIDFOOT ASSOCIATED WITH TYPE 2 DIABETES MELLITUS, WITH BONE INVOLVEMENT WITHOUT EVIDENCE OF NECROSIS: ICD-10-CM

## 2024-06-07 DIAGNOSIS — M86.10 ACUTE ON CHRONIC OSTEOMYELITIS: ICD-10-CM

## 2024-06-07 DIAGNOSIS — E11.49 TYPE II DIABETES MELLITUS WITH NEUROLOGICAL MANIFESTATIONS: Primary | ICD-10-CM

## 2024-06-07 DIAGNOSIS — E11.621 DIABETIC ULCER OF LEFT MIDFOOT ASSOCIATED WITH TYPE 2 DIABETES MELLITUS, WITH BONE INVOLVEMENT WITHOUT EVIDENCE OF NECROSIS: ICD-10-CM

## 2024-06-07 DIAGNOSIS — M86.60 ACUTE ON CHRONIC OSTEOMYELITIS: ICD-10-CM

## 2024-06-07 PROCEDURE — 99499 UNLISTED E&M SERVICE: CPT | Mod: ,,, | Performed by: PODIATRIST

## 2024-06-07 PROCEDURE — 11044 DBRDMT BONE 1ST 20 SQ CM/<: CPT | Mod: ,,, | Performed by: PODIATRIST

## 2024-06-07 PROCEDURE — 11044 DBRDMT BONE 1ST 20 SQ CM/<: CPT | Performed by: PODIATRIST

## 2024-06-07 RX ORDER — TOBRAMYCIN 1.2 G/30ML
1 INJECTION, POWDER, LYOPHILIZED, FOR SOLUTION INTRAVENOUS DAILY PRN
COMMUNITY
Start: 2024-05-22

## 2024-06-07 NOTE — PROGRESS NOTES
[facility]                Progress Note    Subjective:       Patient ID: Indio Ryder Jr. is a 53 y.o. male.    Chief Complaint: Wound Check, Dressing Change, and Diabetic Foot Ulcer    Follow up wound care visit. Patient ambulated using knee scooter to off-load left foot with prescribed CAM boot on LLE and diabetic tennis shoe w/lift on right foot to [facility]. Patient denies fever, chills, nausea, vomiting or diarrhea at present. Patient denies pain to wound bed at present. Patient reports not having any issues with dressing since nurse visit. Dressing appears intact with a copious amount of drainage, causing strikethrough to outer layer of dressing.         Review of Systems   Constitutional:  Negative for appetite change, chills, fatigue and fever.   HENT:  Negative for hearing loss.    Eyes:  Negative for photophobia and visual disturbance.   Respiratory:  Negative for cough, chest tightness, shortness of breath and wheezing.    Cardiovascular:  Positive for leg swelling. Negative for chest pain and palpitations.   Gastrointestinal:  Negative for constipation, diarrhea, nausea and vomiting.   Endocrine: Negative for cold intolerance and heat intolerance.   Genitourinary:  Negative for flank pain.   Musculoskeletal:  Positive for gait problem and myalgias. Negative for neck pain and neck stiffness.   Skin:  Positive for wound. Negative for color change.   Neurological:  Positive for numbness. Negative for weakness, light-headedness and headaches.        + paresthesia    Psychiatric/Behavioral:  Negative for sleep disturbance.          Objective:        Physical Exam  Constitutional:       Appearance: He is well-developed.   Cardiovascular:      Comments: Dorsalis pedis and posterior tibial pulses are palpable Skin is atrophic, slightly hyperpigmented, and mildly edematous      Musculoskeletal:         General: No tenderness. Normal range of motion.      Comments:  Muscle strength is 5/5 in all groups bilaterally.    S/p partial 5th ray amp b/l    S/p hallux amp right    Fat pad atrophy to heels and met heads bilateral       Skin:     General: Skin is warm and dry.      Coloration: Skin is not jaundiced, mottled or sallow.      Findings: Wound (see below) present. No abscess or ecchymosis.      Comments:        Neurological:      Mental Status: He is alert and oriented to person, place, and time.      Comments: [place]-Nenita 5.07 monofilamant testing is diminished Kenji feet. Sharp/dull sensation diminished Bilaterally. Light touch absent Bilaterally.       Psychiatric:         Behavior: Behavior normal.         Vitals:    06/07/24 0825   BP: (!) 143/72   Pulse: 70   Resp: 18   Temp: 97.6 °F (36.4 °C)       Assessment:           ICD-10-CM ICD-9-CM   1. Type II diabetes mellitus with neurological manifestations  E11.49 250.60   2. Diabetic ulcer of left midfoot associated with type 2 diabetes mellitus, with bone involvement without evidence of necrosis  E11.621 250.80    L97.426 707.14   3. Acute on chronic osteomyelitis  M86.10 730.00    M86.60 730.10            (Retired) Wound 04/08/22 1137 Diabetic Ulcer Left plantar;lateral Foot (Active)   04/08/22 1137    Pre-existing: Yes   Primary Wound Type: Diabetic ulc   Side: Left   Orientation: plantar;lateral   Location: Foot   Wound Number:    Ankle-Brachial Index:    Pulses:    Removal Indication and Assessment:    Wound Outcome:    (Retired) Wound Type:    (Retired) Wound Length (cm):    (Retired) Wound Width (cm):    (Retired) Depth (cm):    Wound Description (Comments):    Removal Indications:    Wound Image   06/07/24 0830   Wound WDL ex 06/07/24 0830   Dressing Appearance Intact;Moist drainage;Saturated;Area marked 06/07/24 0830   Drainage Amount Copious 06/07/24 0830   Drainage Characteristics/Odor Serosanguineous 06/07/24 0830   Appearance Red;Muscle;Tendon;Bone;Wet 06/07/24 0830   Tissue loss description Full thickness  06/07/24 0830   Black (%), Wound Tissue Color 0 % 06/07/24 0830   Red (%), Wound Tissue Color 90 % 06/07/24 0830   Yellow (%), Wound Tissue Color 10 % 06/07/24 0830   Periwound Area Pale white;Moist 06/07/24 0830   Wound Edges Defined;Open 06/07/24 0830   Wound Length (cm) 4 cm 06/07/24 0830   Wound Width (cm) 3.2 cm 06/07/24 0830   Wound Depth (cm) 1 cm 06/07/24 0830   Wound Volume (cm^3) 12.8 cm^3 06/07/24 0830   Wound Surface Area (cm^2) 12.8 cm^2 06/07/24 0830   Tunneling (depth (cm)/location) 1.2 cm @ 2 o'clock position (toes at 12 o'clock) probes to bone 06/07/24 0830   Undermining (depth (cm)/location) 0 06/07/24 0830   Care Cleansed with:;Antimicrobial agent;Sterile normal saline;Wound cleanser;Debrided 06/07/24 0830   Dressing Applied;Other (comment);Hydrofiber;Absorptive Pad;Foam;Cast padding;Compression wrap 06/07/24 0830   Periwound Care Moisture barrier applied 06/07/24 0830   Compression Two layer compression 06/07/24 0830   Off Loading Football dressing;Off loading shoe 06/07/24 0830   Dressing Change Due 06/11/24 06/07/24 0830           Plan:                Education about the prevention of limb loss.    Discussed wound healing cycle, skin integrity, ways to care for skin.Counseled patient on the effects of biomechanical pressure,  PVD, and blood glucose on healing. He verbalizes understanding that it can increase the chances of delayed healing and this prolonged exposure leads to infection or progression of infection which subsequently can result in loss of limb.    Blister (2.3 cm diameter) noted at 6 o'clock position below wound bed, serosanguinous drainage    Dressing removed & wound cleaned by nurse. Left Foot wound appears worse: tendon and muscle visible in wound bed, depth probes to bone. Patient reports he is taking oral Augmentin as ordered.     He is to continue oral ABX, but will hold on topical ABX application x 1 week d/t drainage.     Left Foot Plantar wound is deeper at the center of  wound bed, with muscle belly and tendon exposed and now also probing to bone laterally. Regressing    Patient agreeable to surgical consult with Dr. Reyes to assess need for foot surgery.      Wound Debridement    Date/Time: 6/7/2024 7:17 AM    Performed by: Marivel Peterson DPM  Authorized by: Marivel Peterson DPM      Wound Details:    Location:  Left foot    Location:  Left Plantar    Type of Debridement:  Excisional       Length (cm):  4       Area (sq cm):  12.8       Width (cm):  3.2       Percent Debrided (%):  100       Depth (cm):  1       Total Area Debrided (sq cm):  12.8    Depth of debridement:  Bone    Tissue debrided:  Bone, Dermis, Epidermis, Tendon and Muscle    Devitalized tissue debrided:  Callus and Fibrin    Instruments:  Curette, Forceps, Scissors and Rongeur  Bleeding:  Moderate  Hemostasis Achieved: Yes  Method Used:  Pressure  Patient tolerance:  Patient tolerated the procedure well with no immediate complications      Tissue pathology and/or culture taken     [] Yes      [x] No  Sharp debridement performed                   [x] Yes       [] No  Labs ordered     [] Yes       [x] No  Imaging ordered    [] Yes      [x] No    Orders Placed This Encounter   Procedures    Debridement     This order was created via procedure documentation     Standing Status:   Standing     Number of Occurrences:   1    Change dressing     Wound Dressing Orders       Dressing change frequency twice a week (prn nurse visit)   Remove old dressing   Cleanse or irrigate with: Hibiclens and Normal Saline     Moisturize dry skin on leg with Enlivaderm     Protect periwound with:  Gentian Violet hugging wound bed, Cavilon/Benzoin where custom pad will be applied to plantar foot   Primary dressing: WOUND BASE: Iodoflex to wound bed   Secondary dressing: Custom Felt pad (cut to cover entire plantar foot with hole cut the size of wound bed to allow for drainage) Drawtex (cut to size of felt opening, 2 layers) wicking to  "Mextra 5" x 9" laid vertically.  Mepilex foam non-border.  Compression/Off-load: Football (cast padding x 2 rolls, 1/2 roll fan folded to plantar for cushion). UrgoK2, size regular, 40 mmHg (ankle size 24.5 cm) CAM Boot on the affected foot      Change at Nurse Visit.        Follow up in about 4 days (around 6/11/2024) for Nurse Visit.       "

## 2024-06-10 ENCOUNTER — PATIENT MESSAGE (OUTPATIENT)
Dept: INTERNAL MEDICINE | Facility: CLINIC | Age: 54
End: 2024-06-10
Payer: MEDICARE

## 2024-06-11 ENCOUNTER — HOSPITAL ENCOUNTER (OUTPATIENT)
Dept: WOUND CARE | Facility: HOSPITAL | Age: 54
Discharge: HOME | End: 2024-06-11
Attending: INTERNAL MEDICINE
Payer: MEDICARE

## 2024-06-11 VITALS — HEART RATE: 95 BPM | TEMPERATURE: 97 F | DIASTOLIC BLOOD PRESSURE: 73 MMHG | SYSTOLIC BLOOD PRESSURE: 149 MMHG

## 2024-06-11 DIAGNOSIS — M86.10 ACUTE ON CHRONIC OSTEOMYELITIS: ICD-10-CM

## 2024-06-11 DIAGNOSIS — E11.621 DIABETIC ULCER OF LEFT MIDFOOT ASSOCIATED WITH TYPE 2 DIABETES MELLITUS, WITH BONE INVOLVEMENT WITHOUT EVIDENCE OF NECROSIS: ICD-10-CM

## 2024-06-11 DIAGNOSIS — L97.426 DIABETIC ULCER OF LEFT MIDFOOT ASSOCIATED WITH TYPE 2 DIABETES MELLITUS, WITH BONE INVOLVEMENT WITHOUT EVIDENCE OF NECROSIS: ICD-10-CM

## 2024-06-11 DIAGNOSIS — M86.60 ACUTE ON CHRONIC OSTEOMYELITIS: ICD-10-CM

## 2024-06-11 DIAGNOSIS — E11.49 TYPE II DIABETES MELLITUS WITH NEUROLOGICAL MANIFESTATIONS: Primary | ICD-10-CM

## 2024-06-11 PROCEDURE — 29581 APPL MULTLAYER CMPRN SYS LEG: CPT

## 2024-06-11 NOTE — PROGRESS NOTES
[facility]  [address]  [id number]  Nurse Visit    Subjective:       Patient seen in clinic today. Patient ambulated with assistance of knee scooter for LLE to [facility]. Prescribed CAM boot on LLE and diabetic/orthopedic tennis shoe w/lift on right foot. Patient denies fever, chills, nausea, vomiting or diarrhea at present. Patient denies pain or discomfort to wound bed at present. Patient reports not having any issues with dressing since last visit. Dressing appears intact with much less drainage than last visit, there is strike through drainage to Mepilex foam (but not to UrgoK2 dressing or outer layers of cast padding). Patient reports continuing oral Augmentin BID and drinking Dante twice daily. Dressing removed & wound cleaned by nurse. Patient will return to [facility] on Friday. Dressing changed as ordered.      Assessment:          (Retired) Wound 04/08/22 1137 Diabetic Ulcer Left plantar;lateral Foot (Active)   04/08/22 1137    Pre-existing: Yes   Primary Wound Type: Diabetic ulc   Side: Left   Orientation: plantar;lateral   Location: Foot   Wound Number:    Ankle-Brachial Index:    Pulses:    Removal Indication and Assessment:    Wound Outcome:    (Retired) Wound Type:    (Retired) Wound Length (cm):    (Retired) Wound Width (cm):    (Retired) Depth (cm):    Wound Description (Comments):    Removal Indications:    Wound Image   06/11/24 1530   Wound WDL ex 06/11/24 1530   Dressing Appearance Intact;Moist drainage 06/11/24 1530   Drainage Amount Large 06/11/24 1530   Drainage Characteristics/Odor Serosanguineous;Malodorous 06/11/24 1530   Appearance Red;Moist;Bleeding;Tendon 06/11/24 1530   Tissue loss description Full thickness 06/11/24 1530   Black (%), Wound Tissue Color 0 % 06/11/24 1530   Red (%), Wound Tissue Color 80 % 06/11/24 1530   Yellow (%), Wound Tissue Color 20 % 06/11/24 1530   Periwound Area Pale white;Macerated 06/11/24 1530   Wound Edges Defined;Open 06/11/24  "1530   Care Cleansed with:;Antimicrobial agent;Sterile normal saline 06/11/24 1530   Dressing Changed;Foam;Hydrofiber;Absorptive Pad;Cast padding;Compression wrap 06/11/24 1530   Periwound Care Moisture barrier applied;Absorptive dressing applied 06/11/24 1530   Compression Two layer compression 06/11/24 1530   Off Loading Football dressing;Off loading shoe 06/11/24 1530   Dressing Change Due 06/14/24 06/11/24 1530           Plan:     Wound Dressing Orders      Dressing change frequency twice a week (prn nurse visit)  Remove old dressing  Cleanse or irrigate with: Hibiclens and Normal Saline    Moisturize dry skin on leg with Enlivaderm    Protect periwound with:  Gentian Violet hugging wound bed, Cavilon/Benzoin where custom pad will be applied to plantar foot  Primary dressing: WOUND BASE: Iodoflex to wound bed  Secondary dressing: Custom Felt pad (cut to cover entire plantar foot with hole cut the size of wound bed to allow for drainage) Drawtex (cut to size of felt opening, 2 layers) wicking to Mextra 5" x 9" laid vertically.  Mepilex foam non-border.  Compression/Off-load: Football (cast padding x 2 rolls). UrgoK2, size regular, 40 mmHg (ankle size 24.5 cm) CAM Boot on the affected foot             Follow up in about 3 days (around 6/14/2024) for Wound Care.        "

## 2024-06-14 ENCOUNTER — HOSPITAL ENCOUNTER (OUTPATIENT)
Dept: WOUND CARE | Facility: HOSPITAL | Age: 54
Discharge: HOME | End: 2024-06-14
Attending: PODIATRIST
Payer: MEDICARE

## 2024-06-14 VITALS
RESPIRATION RATE: 16 BRPM | DIASTOLIC BLOOD PRESSURE: 84 MMHG | HEART RATE: 88 BPM | TEMPERATURE: 98 F | SYSTOLIC BLOOD PRESSURE: 166 MMHG

## 2024-06-14 DIAGNOSIS — E11.621 DIABETIC ULCER OF LEFT MIDFOOT ASSOCIATED WITH TYPE 2 DIABETES MELLITUS, WITH BONE INVOLVEMENT WITHOUT EVIDENCE OF NECROSIS: Primary | ICD-10-CM

## 2024-06-14 DIAGNOSIS — L97.426 DIABETIC ULCER OF LEFT MIDFOOT ASSOCIATED WITH TYPE 2 DIABETES MELLITUS, WITH BONE INVOLVEMENT WITHOUT EVIDENCE OF NECROSIS: Primary | ICD-10-CM

## 2024-06-14 PROCEDURE — 11042 DBRDMT SUBQ TIS 1ST 20SQCM/<: CPT

## 2024-06-14 NOTE — PROGRESS NOTES
[facility]                Progress Note    Subjective:       Patient ID: Indio Ryder Jr. is a 53 y.o. male.    Chief Complaint: Dressing Change, Wound Care, and Wound Check    Follow up wound care visit. Patient ambulated using knee scooter to off-load left foot with prescribed CAM boot on LLE and diabetic tennis shoe w/lift on right foot to [facility]. Patient denies fever, chills, nausea, vomiting or diarrhea at present; denies pain to wound bed at present. Patient reports not having any issues with dressing since nurse visit. Dressing appears intact with a large amount of drainage, no strikethrough drainage noted at present. Changes to drsg order; applied per MD order. Pt denied AVS; has MyChart.     Dressing Change    Wound Check    Review of Systems      Objective:        Physical Exam    Vitals:    06/14/24 1302   BP: (!) 166/84   Pulse: 88   Resp: 16   Temp: 98.4 °F (36.9 °C)       Assessment:           ICD-10-CM ICD-9-CM   1. Diabetic ulcer of left midfoot associated with type 2 diabetes mellitus, with bone involvement without evidence of necrosis  E11.621 250.80    L97.426 707.14            (Retired) Wound 04/08/22 1137 Diabetic Ulcer Left plantar;lateral Foot (Active)   04/08/22 1137    Pre-existing: Yes   Primary Wound Type: Diabetic ulc   Side: Left   Orientation: plantar;lateral   Location: Foot   Wound Number:    Ankle-Brachial Index:    Pulses:    Removal Indication and Assessment:    Wound Outcome:    (Retired) Wound Type:    (Retired) Wound Length (cm):    (Retired) Wound Width (cm):    (Retired) Depth (cm):    Wound Description (Comments):    Removal Indications:    Wound WDL ex 06/14/24 1303   Dressing Appearance Intact;Moist drainage 06/14/24 1303   Drainage Amount Large 06/14/24 1303   Drainage Characteristics/Odor Serosanguineous;No odor 06/14/24 1303   Appearance Red;Moist;Bone;Muscle;Tendon 06/14/24 1303   Tissue loss description Full thickness  "06/14/24 1303   Red (%), Wound Tissue Color 80 % 06/14/24 1303   Yellow (%), Wound Tissue Color 20 % 06/14/24 1303   Wound Edges Defined;Open 06/14/24 1303   Wound Length (cm) 4 cm 06/14/24 1303   Wound Width (cm) 3.1 cm 06/14/24 1303   Wound Depth (cm) 1 cm 06/14/24 1303   Wound Volume (cm^3) 12.4 cm^3 06/14/24 1303   Wound Surface Area (cm^2) 12.4 cm^2 06/14/24 1303   Tunneling (depth (cm)/location) 1.2 cm @ 2o'clock (toes at 12) probes to bone 06/14/24 1303   Care Cleansed with:;Soap and water;Sterile normal saline;Antimicrobial agent 06/14/24 1303   Dressing Applied 06/14/24 1303   Periwound Care Moisturizer applied 06/14/24 1303   Compression Two layer compression 06/14/24 1303   Off Loading Football dressing;Off loading shoe 06/14/24 1303   Dressing Change Due 06/18/24 06/14/24 1303           Plan:              Tissue pathology and/or culture taken     [] Yes      [x] No  Sharp debridement performed                   [x] Yes       [] No  Labs ordered     [] Yes       [x] No  Imaging ordered    [] Yes      [x] No    Orders Placed This Encounter   Procedures    Change dressing     Wound Dressing Orders       Dressing change frequency twice a week (prn nurse visit)   Remove old dressing   Cleanse or irrigate with: Hibiclens and Normal Saline     Moisturize dry skin on leg with Enlivaderm     Protect periwound with:  Gentian Violet hugging wound bed, Cavilon/Benzoin where custom pad will be applied to plantar foot   Primary dressing: WOUND BASE: Endoform to exposed bone; Iodoflex to wound bed   Secondary dressing: Custom Felt pad (cut to cover entire plantar foot with hole cut the size of wound bed to allow for drainage) Drawtex (cut to size of felt opening, 2 layers) wicking to Mextra 5" x 9" laid vertically.  Mepilex foam non-border.   Compression/Off-load: Football (cast padding x 2 rolls, 1/2 roll fan folded to plantar for cushion). UrgoK2, size regular, 40 mmHg (ankle size 24.5 cm) CAM Boot on the affected " foot      Change at Nurse Visit.        Follow up in about 1 week (around 6/21/2024) for Wound Care.

## 2024-06-18 ENCOUNTER — HOSPITAL ENCOUNTER (OUTPATIENT)
Dept: WOUND CARE | Facility: HOSPITAL | Age: 54
Discharge: HOME | End: 2024-06-18
Attending: PODIATRIST
Payer: MEDICARE

## 2024-06-18 VITALS
DIASTOLIC BLOOD PRESSURE: 84 MMHG | SYSTOLIC BLOOD PRESSURE: 144 MMHG | RESPIRATION RATE: 16 BRPM | HEART RATE: 94 BPM | TEMPERATURE: 98 F

## 2024-06-18 PROCEDURE — 29581 APPL MULTLAYER CMPRN SYS LEG: CPT

## 2024-06-18 NOTE — PROGRESS NOTES
[facility]  [address]  [id number]  Nurse Visit    Subjective:       Pt arrived to [facility] ambulating without any issues. Pt denies any fever, chills, or flu-like symptoms; denies pain/discomfort. Pt reports not having any issues with drsg/compression since last visit. Noted to have strike through drainage (on exterior coban) over wound area with malodor. Drsg removed & wound cleaned by RN. LE measurements/circumference: Lt Ankle: 23cm. Noted to have bone exposed in wound bed. Dressing changed as ordered. Pt will f/u with Dr. Peterson in podiatry clinic then will return to [facility] for RNV on 7/25. AVS printed for pt.      Assessment:          (Retired) Wound 04/08/22 1137 Diabetic Ulcer Left plantar;lateral Foot (Active)   04/08/22 1137    Pre-existing: Yes   Primary Wound Type: Diabetic ulc   Side: Left   Orientation: plantar;lateral   Location: Foot   Wound Number:    Ankle-Brachial Index:    Pulses:    Removal Indication and Assessment:    Wound Outcome:    (Retired) Wound Type:    (Retired) Wound Length (cm):    (Retired) Wound Width (cm):    (Retired) Depth (cm):    Wound Description (Comments):    Removal Indications:    Wound WDL ex 06/18/24 1656   Dressing Appearance Moist drainage;Saturated 06/18/24 1656   Drainage Amount Large 06/18/24 1656   Drainage Characteristics/Odor Serosanguineous;Malodorous 06/18/24 1656   Appearance Pink;Red;Bone 06/18/24 1656   Tissue loss description Full thickness 06/18/24 1656   Black (%), Wound Tissue Color 0 % 06/18/24 1656   Red (%), Wound Tissue Color 100 % 06/18/24 1656   Yellow (%), Wound Tissue Color 0 % 06/18/24 1656   Periwound Area Intact;Macerated 06/18/24 1656   Wound Edges Defined 06/18/24 1656   Wound Length (cm) 4 cm 06/18/24 1656   Wound Width (cm) 3.1 cm 06/18/24 1656   Wound Depth (cm) 1 cm 06/18/24 1656   Wound Volume (cm^3) 12.4 cm^3 06/18/24 1656   Wound Surface Area (cm^2) 12.4 cm^2 06/18/24 1656   Tunneling (depth  (cm)/location) 0 06/18/24 1656   Undermining (depth (cm)/location) 0 06/18/24 1656   Care Cleansed with:;Sterile normal saline 06/18/24 1656   Dressing Applied 06/18/24 1656   Periwound Care Moisture barrier applied 06/18/24 1656   Compression Two layer compression 06/18/24 1656   Off Loading Football dressing;Off loading shoe 06/18/24 1656           Plan:     No orders of the defined types were placed in this encounter.          Follow up in about 2 days (around 6/20/2024) for Wound Care with Dr. Peterson.

## 2024-06-20 ENCOUNTER — OFFICE VISIT (OUTPATIENT)
Dept: PODIATRY | Facility: CLINIC | Age: 54
End: 2024-06-20
Payer: MEDICARE

## 2024-06-20 VITALS
WEIGHT: 220 LBS | SYSTOLIC BLOOD PRESSURE: 137 MMHG | HEART RATE: 100 BPM | DIASTOLIC BLOOD PRESSURE: 90 MMHG | BODY MASS INDEX: 29.16 KG/M2 | HEIGHT: 73 IN

## 2024-06-20 DIAGNOSIS — E11.49 TYPE II DIABETES MELLITUS WITH NEUROLOGICAL MANIFESTATIONS: ICD-10-CM

## 2024-06-20 DIAGNOSIS — L97.426 DIABETIC ULCER OF LEFT MIDFOOT ASSOCIATED WITH TYPE 2 DIABETES MELLITUS, WITH BONE INVOLVEMENT WITHOUT EVIDENCE OF NECROSIS: Primary | ICD-10-CM

## 2024-06-20 DIAGNOSIS — E11.621 DIABETIC ULCER OF LEFT MIDFOOT ASSOCIATED WITH TYPE 2 DIABETES MELLITUS, WITH BONE INVOLVEMENT WITHOUT EVIDENCE OF NECROSIS: Primary | ICD-10-CM

## 2024-06-20 PROCEDURE — 99499 UNLISTED E&M SERVICE: CPT | Mod: S$PBB,,, | Performed by: PODIATRIST

## 2024-06-20 PROCEDURE — 11044 DBRDMT BONE 1ST 20 SQ CM/<: CPT | Mod: PBBFAC,PO | Performed by: PODIATRIST

## 2024-06-20 PROCEDURE — 99999 PR PBB SHADOW E&M-EST. PATIENT-LVL III: CPT | Mod: PBBFAC,,, | Performed by: PODIATRIST

## 2024-06-20 PROCEDURE — 99213 OFFICE O/P EST LOW 20 MIN: CPT | Mod: PBBFAC,PO | Performed by: PODIATRIST

## 2024-06-24 ENCOUNTER — HOSPITAL ENCOUNTER (OUTPATIENT)
Dept: RADIOLOGY | Facility: HOSPITAL | Age: 54
Discharge: HOME | End: 2024-06-24
Attending: INTERNAL MEDICINE
Payer: MEDICARE

## 2024-06-24 DIAGNOSIS — M86.672 CHRONIC OSTEOMYELITIS OF LEFT FOOT: ICD-10-CM

## 2024-06-24 PROCEDURE — 93922 UPR/L XTREMITY ART 2 LEVELS: CPT | Mod: TC,52

## 2024-06-24 PROCEDURE — 93922 UPR/L XTREMITY ART 2 LEVELS: CPT | Mod: 26,52,, | Performed by: RADIOLOGY

## 2024-06-24 PROCEDURE — 93926 LOWER EXTREMITY STUDY: CPT | Mod: 26,LT,, | Performed by: RADIOLOGY

## 2024-06-24 NOTE — PROGRESS NOTES
[facility]                           Progress Note    Subjective:       Patient ID: Indio Ryder Jr. is a 53 y.o. male.    Chief Complaint: Diabetes Mellitus and Diabetic Foot Ulcer    Follow up wound care visit. Patient ambulated using knee scooter to off-load left foot with prescribed CAM boot on LLE and diabetic tennis shoe w/lift on right foot to [facility]. Patient denies fever, chills, nausea, vomiting or diarrhea at present. Patient denies pain to wound bed at present. Patient reports not having any issues with dressing since nurse visit. Dressing appears intact with a copious amount of drainage, causing strikethrough to outer layer of dressing.     06/20/2024 patient presents to podiatry clinic for evaluation treatment of left foot wound.  Patient [facility] only seen in the [facility] but is being seen today as I will not be present in the clinic for his encounter this week.  Patient states that he is kept his dressing intact since his nurse visit.  He states that he decided for himself to take 1 of his antibiotics per day instead of 2 to assist with the amount of drainage to his wound.  He denies pain secondary to neuropathy.        Review of Systems   Constitutional:  Negative for appetite change, chills, fatigue and fever.   HENT:  Negative for hearing loss.    Eyes:  Negative for photophobia and visual disturbance.   Respiratory:  Negative for cough, chest tightness, shortness of breath and wheezing.    Cardiovascular:  Positive for leg swelling. Negative for chest pain and palpitations.   Gastrointestinal:  Negative for constipation, diarrhea, nausea and vomiting.   Endocrine: Negative for cold intolerance and heat intolerance.   Genitourinary:  Negative for flank pain.   Musculoskeletal:  Positive for gait problem and myalgias. Negative for neck pain and neck stiffness.   Skin:  Positive for wound. Negative for color change.   Neurological:  Positive for numbness.  "Negative for weakness, light-headedness and headaches.        + paresthesia    Psychiatric/Behavioral:  Negative for sleep disturbance.          Objective:     Vitals:    06/20/24 0925   BP: (!) 137/90   Pulse: 100   Weight: 99.8 kg (220 lb 0.3 oz)   Height: 6' 1" (1.854 m)          Physical Exam  Constitutional:       Appearance: He is well-developed.   Cardiovascular:      Comments: Dorsalis pedis and posterior tibial pulses are palpable Skin is atrophic, slightly hyperpigmented, and mildly edematous      Musculoskeletal:         General: No tenderness. Normal range of motion.      Comments: Muscle strength is 5/5 in all groups bilaterally.    S/p partial 5th ray amp b/l    S/p hallux amp right    Fat pad atrophy to heels and met heads bilateral       Skin:     General: Skin is warm and dry.      Coloration: Skin is not jaundiced, mottled or sallow.      Findings: Wound (see below) present. No abscess or ecchymosis.      Comments:        Neurological:      Mental Status: He is alert and oriented to person, place, and time.      Comments: [place]-Nenita 5.07 monofilamant testing is diminished Kenji feet. Sharp/dull sensation diminished Bilaterally. Light touch absent Bilaterally.       Psychiatric:         Behavior: Behavior normal.        6/20/2024             Assessment:           ICD-10-CM ICD-9-CM   1. Diabetic ulcer of left midfoot associated with type 2 diabetes mellitus, with bone involvement without evidence of necrosis  E11.621 250.80    L97.426 707.14   2. Type II diabetes mellitus with neurological manifestations  E11.49 250.60                  Plan:                Education about the prevention of limb loss.    Discussed wound healing cycle, skin integrity, ways to care for skin.Counseled patient on the effects of biomechanical pressure,  PVD, and blood glucose on healing. He verbalizes understanding that it can increase the chances of delayed healing and this prolonged exposure leads to infection or " progression of infection which subsequently can result in loss of limb.    Dressing removed & wound cleaned by nurse. Left Foot wound with bone, tendon and muscle visible in wound bed.    He is to continue oral ABX, but will hold on topical ABX application x 1 week d/t drainage.     Left Foot Plantar wound is deeper at the center of wound bed, with muscle belly and tendon exposed and now also probing to bone laterally. Regressing    Patient agreeable to surgical consult with Dr. Reyes to assess need for foot surgery, pending.      Wound Debridement    Date/Time: 6/20/2024 9:00 AM    Performed by: Marivel Peterson DPM  Authorized by: Marivel Peterson DPM      Wound Details:    Location:  Left foot    Location:  Left Midfoot       Length (cm):  4.2       Area (sq cm):  13.86       Width (cm):  3.3       Percent Debrided (%):  100       Depth (cm):  1       Total Area Debrided (sq cm):  13.86    Depth of debridement:  Bone    Tissue debrided:  Bone, Dermis, Epidermis, Muscle, Tendon and Subcutaneous    Devitalized tissue debrided:  Callus, Fibrin and Slough    Instruments:  Blade, Curette, Nippers, Scissors and Forceps  Bleeding:  Moderate  Hemostasis Achieved: Yes  Method Used:  Pressure and Alginate  Patient tolerance:  Patient tolerated the procedure well with no immediate complications      Tissue pathology and/or culture taken     [] Yes      [x] No  Sharp debridement performed                   [x] Yes       [] No  Labs ordered     [] Yes       [x] No  Imaging ordered    [] Yes      [x] No    Orders Placed This Encounter   Procedures    Wound Debridement     This order was created via procedure documentation        No follow-ups on file.

## 2024-06-25 ENCOUNTER — HOSPITAL ENCOUNTER (OUTPATIENT)
Dept: WOUND CARE | Facility: HOSPITAL | Age: 54
Discharge: HOME | End: 2024-06-25
Attending: PODIATRIST
Payer: MEDICARE

## 2024-06-25 VITALS — TEMPERATURE: 98 F | DIASTOLIC BLOOD PRESSURE: 81 MMHG | HEART RATE: 101 BPM | SYSTOLIC BLOOD PRESSURE: 115 MMHG

## 2024-06-25 DIAGNOSIS — M86.60 ACUTE ON CHRONIC OSTEOMYELITIS: ICD-10-CM

## 2024-06-25 DIAGNOSIS — L02.414 ABSCESS OF LEFT ARM: ICD-10-CM

## 2024-06-25 DIAGNOSIS — E11.49 TYPE II DIABETES MELLITUS WITH NEUROLOGICAL MANIFESTATIONS: ICD-10-CM

## 2024-06-25 DIAGNOSIS — E11.65 TYPE 2 DIABETES MELLITUS WITH HYPERGLYCEMIA, WITH LONG-TERM CURRENT USE OF INSULIN: ICD-10-CM

## 2024-06-25 DIAGNOSIS — M86.10 ACUTE ON CHRONIC OSTEOMYELITIS: ICD-10-CM

## 2024-06-25 DIAGNOSIS — E11.621 DIABETIC ULCER OF LEFT MIDFOOT ASSOCIATED WITH TYPE 2 DIABETES MELLITUS, WITH BONE INVOLVEMENT WITHOUT EVIDENCE OF NECROSIS: Primary | ICD-10-CM

## 2024-06-25 DIAGNOSIS — L97.426 DIABETIC ULCER OF LEFT MIDFOOT ASSOCIATED WITH TYPE 2 DIABETES MELLITUS, WITH BONE INVOLVEMENT WITHOUT EVIDENCE OF NECROSIS: Primary | ICD-10-CM

## 2024-06-25 DIAGNOSIS — Z79.4 TYPE 2 DIABETES MELLITUS WITH HYPERGLYCEMIA, WITH LONG-TERM CURRENT USE OF INSULIN: ICD-10-CM

## 2024-06-25 DIAGNOSIS — L02.414 ABSCESS OF LEFT ARM: Primary | ICD-10-CM

## 2024-06-25 PROCEDURE — 99213 OFFICE O/P EST LOW 20 MIN: CPT | Mod: 25

## 2024-06-25 PROCEDURE — 29581 APPL MULTLAYER CMPRN SYS LEG: CPT

## 2024-06-25 PROCEDURE — 87070 CULTURE OTHR SPECIMN AEROBIC: CPT | Performed by: FAMILY MEDICINE

## 2024-06-25 RX ORDER — INSULIN ASPART 100 [IU]/ML
INJECTION, SOLUTION INTRAVENOUS; SUBCUTANEOUS
Qty: 15 ML | Refills: 2 | Status: SHIPPED | OUTPATIENT
Start: 2024-06-25

## 2024-06-25 NOTE — PROGRESS NOTES
[facility]  [address]  [address]  Nurse Visit    Subjective:       Patient seen in clinic today. Patient ambulated with assistance of knee scooter for LLE to [facility]. Prescribed CAM boot on LLE and diabetic/orthopedic tennis shoe w/lift on right foot. Patient denies fever, chills, nausea, vomiting or diarrhea at present. Patient denies pain or discomfort to Left Plantar Foot wound bed at present. Patient reports not having any issues with dressing since last visit. Dressing appears intact with strike through drainage. Patient reports continuing oral Augmentin daily. Dressing removed & wound cleaned by nurse. Patient then states he has a New bump on his Left Arm. Left Deltoid noted to have area of rednes with 2 pinpoint blistered areas. Circled and dated with marker today, it is 5 cm diameter, red and warm to touch, patient denies pain or tenderness at present. Patient is currently on oral ABX d/t his foot wound, he is taking Augmentin 875/125 mg once a day (his decision), but he was advised to return to BID. He has Vashe to apply daily as a soak, but was given infection instructions in case redness goes outside of marked line today, or new s/s of fever, chills or sweats begin. Patient verbalized understanding. Consulted with MD, he repeated same to patient. Culture of Left Deltoid drainage sent to lab. Patient will follow up with PCP for abscess and return to see DPM on Friday. Dressing changed as ordered. Applied new CAM boot (XL size).      Assessment:          (Retired) Wound 04/08/22 1137 Diabetic Ulcer Left plantar;lateral Foot (Active)   04/08/22 1137    Pre-existing: Yes   Primary Wound Type: Diabetic [facility]   Side: Left   Orientation: plantar;lateral   Location: Foot   Wound Number:    Ankle-Brachial Index:    Pulses:    Removal Indication and Assessment:    Wound Outcome:    (Retired) Wound Type:    (Retired) Wound Length (cm):    (Retired) Wound Width (cm):   "  (Retired) Depth (cm):    Wound Description (Comments):    Removal Indications:    Wound WDL ex 06/25/24 1640   Dressing Appearance Intact;Moist drainage;Area marked 06/25/24 1640   Drainage Amount Large 06/25/24 1640   Drainage Characteristics/Odor Serosanguineous;Malodorous 06/25/24 1640   Appearance Pink;Moist;Tendon 06/25/24 1640   Tissue loss description Full thickness 06/25/24 1640   Black (%), Wound Tissue Color 0 % 06/25/24 1640   Red (%), Wound Tissue Color 85 % 06/25/24 1640   Yellow (%), Wound Tissue Color 15 % 06/25/24 1640   Periwound Area Pale white;Moist;Macerated 06/25/24 1640   Wound Edges Defined;Open 06/25/24 [facility]   Care Cleansed with:;Antimicrobial agent;Sterile normal saline;Wound cleanser 06/25/24 1640   Dressing Changed 06/25/24 1640   Periwound Care Moisture barrier applied 06/25/24 1640   Compression Two layer compression 06/25/24 [facility]   Off Loading Football dressing;Off loading shoe 06/25/24 1640   Dressing Change Due 06/28/24 06/25/24 1640           Plan:     Wound Dressing Orders      Dressing change frequency twice a week (prn nurse visit)  Remove old dressing  Cleanse or irrigate with: Hibiclens and Normal Saline    Moisturize dry skin on leg with Enlivaderm    Protect periwound with:  Gentian Violet hugging wound bed, Cavilon/Benzoin where custom pad will be applied to plantar foot  Primary dressing: WOUND BASE: Endoform to exposed tendon then Iodoflex   Secondary dressing: Custom Felt pad (cut to cover entire plantar foot with c-shaped opening to allow for drainage) Drawtex (cut to size of felt opening, 2 layers) wicking to Mextra 5" x 9" laid vertically.  Optifoam Ag non-border.  Compression/Off-load: Football (cast padding x 2 rolls, 1/2 roll fan folded to plantar for cushion). UrgoK2, size large, 40 mmHg (ankle size 25 cm) CAM Boot on the affected foot             Follow up in about 3 days (around 6/28/2024) for Wound Care.          "

## 2024-06-25 NOTE — PROGRESS NOTES
I was asked to look at a patient here today for a nurse visit for an abscess to left arm.  Patient states he feels he was bitten by something over the weekend and noticed this area to left deltoid on Saturday.  He states it was more swollen, but that has improved.  LPN states there was some drainage, so aerobic culture ordered.  He was instructed to do E-visit through [facility] with his PCP as he is currently on antibiotics.  He may need to be seen in person in the event this needs to be I&D'd.  Area of erythema was circled and he is go to the ED should he start to have fevers, chills, or sweats.        Neftali Burrell MD

## 2024-06-26 ENCOUNTER — PATIENT OUTREACH (OUTPATIENT)
Dept: ADMINISTRATIVE | Facility: HOSPITAL | Age: 54
End: 2024-06-26
Payer: MEDICARE

## 2024-06-26 ENCOUNTER — TELEPHONE (OUTPATIENT)
Dept: INTERNAL MEDICINE | Facility: CLINIC | Age: 54
End: 2024-06-26
Payer: MEDICARE

## 2024-06-26 NOTE — PROGRESS NOTES
Health Maintenance Due   Topic Date Due    Shingles Vaccine (1 of 2) Never done    Pneumococcal Vaccines (Age 0-64) (3 of 3 - PPSV23 or PCV20) 08/12/2019    COVID-19 Vaccine (3 - Moderna risk series) 12/17/2021    Diabetes Urine Screening  07/01/2023    Eye Exam  10/06/2023    Lipid Panel  01/13/2024    Hemoglobin A1c  07/20/2024       Chart reviewed kalen richmond,     Jacy Rosario LPN   Clinical Care Coordinator  Primary Care and Wellness

## 2024-06-26 NOTE — TELEPHONE ENCOUNTER
----- Message from Dayana Martínez LPN sent at 6/25/2024  5:14 PM CDT -----  Regarding: Virtual Appointment  Patient was seen today for a nurse visit. He has a new abscess to his Left Upper Arm that has small pinpoint areas that are draining purulent drainage that was sent to the lab. He has requested a virtual visit tomorrow, please see  for photo of area. Circled and dated with marker today, it was 5 cm diameter, red and warm to touch. Patient is currently on oral ABX d/t his foot wound, he is taking Augmentin 875/125 mg once a day (his decision), but he was advised to return to BID. He has Vashe to apply daily as a soak, but was given infection instructions in case redness goes outside of marked line today, fever, nausea, vomiting, diarrhea, etc.     Thank you,     Dayana LANIER LPN

## 2024-06-28 ENCOUNTER — HOSPITAL ENCOUNTER (EMERGENCY)
Facility: HOSPITAL | Age: 54
Discharge: HOME | End: 2024-06-28
Attending: EMERGENCY MEDICINE
Payer: MEDICARE

## 2024-06-28 ENCOUNTER — HOSPITAL ENCOUNTER (OUTPATIENT)
Dept: WOUND CARE | Facility: HOSPITAL | Age: 54
Discharge: HOME | End: 2024-06-28
Attending: PODIATRIST
Payer: MEDICARE

## 2024-06-28 VITALS
BODY MASS INDEX: 29.16 KG/M2 | TEMPERATURE: 99 F | SYSTOLIC BLOOD PRESSURE: 144 MMHG | HEIGHT: 73 IN | OXYGEN SATURATION: 97 % | HEART RATE: 96 BPM | DIASTOLIC BLOOD PRESSURE: 87 MMHG | WEIGHT: 220 LBS | RESPIRATION RATE: 18 BRPM

## 2024-06-28 VITALS — SYSTOLIC BLOOD PRESSURE: 136 MMHG | DIASTOLIC BLOOD PRESSURE: 70 MMHG | TEMPERATURE: 98 F | HEART RATE: 70 BPM

## 2024-06-28 DIAGNOSIS — L03.114 CELLULITIS OF LEFT UPPER EXTREMITY: Primary | ICD-10-CM

## 2024-06-28 DIAGNOSIS — E11.49 TYPE II DIABETES MELLITUS WITH NEUROLOGICAL MANIFESTATIONS: ICD-10-CM

## 2024-06-28 DIAGNOSIS — L97.426 DIABETIC ULCER OF LEFT MIDFOOT ASSOCIATED WITH TYPE 2 DIABETES MELLITUS, WITH BONE INVOLVEMENT WITHOUT EVIDENCE OF NECROSIS: Primary | ICD-10-CM

## 2024-06-28 DIAGNOSIS — E11.621 DIABETIC ULCER OF LEFT MIDFOOT ASSOCIATED WITH TYPE 2 DIABETES MELLITUS, WITH BONE INVOLVEMENT WITHOUT EVIDENCE OF NECROSIS: Primary | ICD-10-CM

## 2024-06-28 LAB — POCT GLUCOSE: 214 MG/DL (ref 70–110)

## 2024-06-28 PROCEDURE — 11044 DBRDMT BONE 1ST 20 SQ CM/<: CPT | Mod: ,,, | Performed by: PODIATRIST

## 2024-06-28 PROCEDURE — 11042 DBRDMT SUBQ TIS 1ST 20SQCM/<: CPT | Performed by: PODIATRIST

## 2024-06-28 PROCEDURE — 25000003 PHARM REV CODE 250: Performed by: EMERGENCY MEDICINE

## 2024-06-28 PROCEDURE — 25000003 PHARM REV CODE 250

## 2024-06-28 PROCEDURE — 99499 UNLISTED E&M SERVICE: CPT | Mod: ,,, | Performed by: PODIATRIST

## 2024-06-28 PROCEDURE — 82962 GLUCOSE BLOOD TEST: CPT

## 2024-06-28 PROCEDURE — 10060 I&D ABSCESS SIMPLE/SINGLE: CPT

## 2024-06-28 PROCEDURE — 99283 EMERGENCY DEPT VISIT LOW MDM: CPT | Mod: 25

## 2024-06-28 RX ORDER — SULFAMETHOXAZOLE AND TRIMETHOPRIM 800; 160 MG/1; MG/1
1 TABLET ORAL 2 TIMES DAILY
Qty: 20 TABLET | Refills: 0 | Status: SHIPPED | OUTPATIENT
Start: 2024-06-28 | End: 2024-07-08

## 2024-06-28 RX ORDER — LIDOCAINE HYDROCHLORIDE 10 MG/ML
5 INJECTION INFILTRATION; PERINEURAL
Status: COMPLETED | OUTPATIENT
Start: 2024-06-28 | End: 2024-06-28

## 2024-06-28 RX ORDER — SULFAMETHOXAZOLE AND TRIMETHOPRIM 800; 160 MG/1; MG/1
2 TABLET ORAL
Status: COMPLETED | OUTPATIENT
Start: 2024-06-28 | End: 2024-06-28

## 2024-06-28 RX ORDER — MUPIROCIN 20 MG/G
1 OINTMENT TOPICAL
Status: COMPLETED | OUTPATIENT
Start: 2024-06-28 | End: 2024-06-28

## 2024-06-28 RX ADMIN — LIDOCAINE HYDROCHLORIDE 5 ML: 10 INJECTION, SOLUTION INFILTRATION; PERINEURAL at 11:06

## 2024-06-28 RX ADMIN — MUPIROCIN 1 TUBE: 20 OINTMENT TOPICAL at 11:06

## 2024-06-28 RX ADMIN — SULFAMETHOXAZOLE AND TRIMETHOPRIM 2 TABLET: 800; 160 TABLET ORAL at 11:06

## 2024-06-28 NOTE — PROGRESS NOTES
[facility]                Progress Note    Subjective:       Patient ID: Indio Ryder Jr. is a 53 y.o. male.    Chief Complaint: Wound Check, Dressing Change, and Diabetic Foot Ulcer    Follow up wound care visit. Patient ambulated using knee scooter to off-load left foot with prescribed CAM boot on LLE and diabetic tennis shoe w/lift on right foot to [facility]. Patient denies fever, chills, nausea, vomiting or diarrhea at present. He denies pain to Left Foot wound bed at present. Patient reports not having any issues with LLE dressing since nurse visit. Dressing appears intact with no strikethrough drainage noted at present. Patient relates he is taking ABX daily BID as ordered, as he has a new abscess that was noted at his nurse visit to his Left Deltoid.     Dressing Change  Associated symptoms include myalgias and numbness. Pertinent negatives include no chest pain, chills, coughing, fatigue, fever, headaches, nausea, neck pain, vomiting or weakness.   Wound Check        Review of Systems   Constitutional:  Negative for appetite change, chills, fatigue and fever.   HENT:  Negative for hearing loss.    Eyes:  Negative for photophobia and visual disturbance.   Respiratory:  Negative for cough, chest tightness, shortness of breath and wheezing.    Cardiovascular:  Positive for leg swelling. Negative for chest pain and palpitations.   Gastrointestinal:  Negative for constipation, diarrhea, nausea and vomiting.   Endocrine: Negative for cold intolerance and heat intolerance.   Genitourinary:  Negative for flank pain.   Musculoskeletal:  Positive for gait problem and myalgias. Negative for neck pain and neck stiffness.   Skin:  Positive for wound. Negative for color change.        Abscess to left arm   Neurological:  Positive for numbness. Negative for weakness, light-headedness and headaches.        + paresthesia    Psychiatric/Behavioral:  Negative for sleep disturbance.           Objective:        Physical Exam  Constitutional:       Appearance: He is well-developed.   Cardiovascular:      Comments: Dorsalis pedis and posterior tibial pulses are palpable Skin is atrophic, slightly hyperpigmented, and mildly edematous      Musculoskeletal:         General: No tenderness. Normal range of motion.      Comments: Muscle strength is 5/5 in all groups bilaterally.    S/p partial 5th ray amp b/l    S/p hallux amp right    Fat pad atrophy to heels and met heads bilateral       Skin:     General: Skin is warm and dry.      Coloration: Skin is not jaundiced, mottled or sallow.      Findings: Wound (see below) present. No abscess or ecchymosis.      Comments:        Neurological:      Mental Status: He is alert and oriented to person, place, and time.      Comments: [place] 5.07 monofilamant testing is diminished Kenji feet. Sharp/dull sensation diminished Bilaterally. Light touch absent Bilaterally.       Psychiatric:         Behavior: Behavior normal.         Vitals:    06/28/24 0840   BP: 136/70   Pulse: 70   Temp: 97.8 °F (36.6 °C)       Assessment:           ICD-10-CM ICD-9-CM   1. Diabetic ulcer of left midfoot associated with type 2 diabetes mellitus, with bone involvement without evidence of necrosis  E11.621 250.80    L97.426 707.14   2. Type II diabetes mellitus with neurological manifestations  E11.49 250.60            (Retired) Wound 04/08/22 1137 Diabetic Ulcer Left plantar;lateral Foot (Active)   04/08/22 1137    Pre-existing: Yes   Primary Wound Type: Diabetic ulc   Side: Left   Orientation: plantar;lateral   Location: Foot   Wound Number:    Ankle-Brachial Index:    Pulses:    Removal Indication and Assessment:    Wound Outcome:    (Retired) Wound Type:    (Retired) Wound Length (cm):    (Retired) Wound Width (cm):    (Retired) Depth (cm):    Wound Description (Comments):    Removal Indications:    Wound Image    06/28/24 1038   Wound WDL ex 06/28/24 1038   Dressing  Appearance Intact;Moist drainage 06/28/24 1038   Drainage Amount Large 06/28/24 1038   Drainage Characteristics/Odor Serosanguineous 06/28/24 1038   Appearance Pink;Red;Moist;Bleeding;Tendon 06/28/24 1038   Tissue loss description Full thickness 06/28/24 1038   Black (%), Wound Tissue Color 0 % 06/28/24 1038   Red (%), Wound Tissue Color 90 % 06/28/24 1038   Yellow (%), Wound Tissue Color 10 % 06/28/24 1038   Periwound Area Pale white;Moist;Macerated 06/28/24 1038   Wound Edges Defined;Open 06/28/24 1038   Wound Length (cm) 5.5 cm 06/28/24 1038   Wound Width (cm) 3.3 cm 06/28/24 1038   Wound Depth (cm) 0.6 cm 06/28/24 1038   Wound Volume (cm^3) 10.89 cm^3 06/28/24 1038   Wound Surface Area (cm^2) 18.15 cm^2 06/28/24 1038   Tunneling (depth (cm)/location) 0 06/28/24 1038   Undermining (depth (cm)/location) 0 06/28/24 1038   Care Cleansed with:;Antimicrobial agent;Sterile normal saline;Wound cleanser;Debrided 06/28/24 1038   Dressing Changed 06/28/24 1038   Periwound Care Moisture barrier applied;Absorptive dressing applied 06/28/24 1038   Compression Tubular elasticized bandage 06/28/24 1038   Off Loading Football dressing;Off loading shoe 06/28/24 1038   Dressing Change Due 07/02/24 06/28/24 1038           Plan:              Education about the prevention of limb loss.    Discussed wound healing cycle, skin integrity, ways to care for skin.Counseled patient on the effects of biomechanical pressure,  PVD, and blood glucose on healing. He verbalizes understanding that it can increase the chances of delayed healing and this prolonged exposure leads to infection or progression of infection which subsequently can result in loss of limb.    Dressing removed & wound cleaned by nurse. Left Foot wound with bone, tendon and muscle visible in wound bed.    He is to continue oral ABX per ID    Left Foot Plantar wound remains deeper at the center of wound bed, with muscle belly, tendon, and bone exposed. stagnant    Patient  agreeable to surgical consult with Dr. Reyes to assess need for foot surgery, scheduled for August, patient placed on waiting list for earlier appointment    Patient was sent to the ER today to assess the Left Deltoid abscess - Vashe soaked gauze and stockinet applied to cover.    Wound Debridement    Date/Time: 6/28/2024 8:33 AM    Performed by: Marivel Peterson DPM  Authorized by: Marivel Peterson DPM      Wound Details:    Location:  Left foot    Location:  Left Midfoot    Type of Debridement:  Excisional       Length (cm):  5.5       Area (sq cm):  18.15       Width (cm):  3.3       Percent Debrided (%):  100       Depth (cm):  3       Total Area Debrided (sq cm):  18.15    Depth of debridement:  Bone    Tissue debrided:  Dermis, Epidermis, Bone and Subcutaneous    Devitalized tissue debrided:  Fibrin and Callus    Instruments:  Curette  Bleeding:  Minimal  Hemostasis Achieved: Yes  Method Used:  Pressure  Patient tolerance:  Patient tolerated the procedure well with no immediate complications      Tissue pathology and/or culture taken     [] Yes      [x] No  Sharp debridement performed                   [x] Yes       [] No  Labs ordered     [] Yes       [x] No  Imaging ordered    [] Yes      [x] No    Orders Placed This Encounter   Procedures    Debridement     This order was created via procedure documentation     Standing Status:   Standing     Number of Occurrences:   1    Change dressing     Wound Dressing Orders      Dressing change frequency twice a week (prn nurse visit)  Remove old dressing  Cleanse or irrigate with: Hibiclens and Normal Saline    Moisturize dry skin on leg with Enlivaderm    Protect periwound with:  Gentian Violet hugging wound bed, Cavilon/Benzoin where custom pad will be applied to plantar foot  Primary dressing: WOUND BASE: Promogran to wound bed  Secondary dressing: Custom Felt pad (cut to cover entire plantar foot with c-shaped open to allow for drainage), Drawtex (cut to size of  "felt opening, 2 layers) wicking to Mextra 5" x 9" laid vertically.  Optifoam Ag foam non-border.  Compression/Off-load: Football (cast padding x 1 1/2 rolls, 1/2 roll fan folded to plantar for cushion) - open toe. Tubigrip, size D, double layer (calf size 34 cm) CAM Boot on the affected foot     Change at Nurse Visit.  - If dressing is saturated to plantar aspect, apply UrgoK2 (40 mmHg) with open toe football        Follow up in about 4 days (around 7/2/2024) for Wound Care.       "

## 2024-06-28 NOTE — DISCHARGE INSTRUCTIONS
Please keep your wound clean and dry.  Wash gently with soap and water and apply antibiotic ointment (bacitracin, neosporin, etc.) over the wound after washing. Please watch for signs of infection including: increased\spreading redness, swelling, pus-like discharge, or a fever greater than 100.4F. If you experience any of these, please contact your Primary Care Doctor or Return to the Emergency Department for a wound check.     Please follow up with your Primary Care Doctor in 2-3 days for wound recheck. You may return to the Emergency Department if you are unable to see your Primary Care Doctor.  Please return to the ER for any new or worsening symptoms.    A healing laceration should not be exposed to direct sunlight because of the risk for hyperpigmentation (darkening of the skin). It is recommended that you use sunscreen on lacerations or abrasions to help prevent the development of hyperpigmentation once the wounds are healed.         Thank you for coming to our Emergency Department today. It is important to remember that some problems or medical conditions are difficult to diagnose and may not be found or addressed during your Emergency Department visit.  These conditions often start with non-specific symptoms and can only be diagnosed on follow up visits with your primary care physician or specialist when the symptoms continue or change. Please remember that all medical conditions can change, and we cannot predict how you will be feeling tomorrow or the next day. Return to the ER with any questions/concerns, new/concerning symptoms including fever, chest pain, shortness of breath, loss of consciousness, dizziness, weakness, worsening symptoms, failure to improve, or any other concerns. Also, please follow up with your Primary Care Physician and/or Pediatrician in the next 1-2 days to review your ED visit in entirety and for re-evaluation.   Be sure to follow up with your primary care doctor and review all  labs/imaging/tests that were performed during your ER visit with them. It is very common for us to identify non-emergent incidental findings which must be followed up with your primary care physician.  Some labs/imaging/tests may be outside of the normal range, and require non-emergent follow-up and/or further investigation/treatment/procedures/testing to help diagnose/exclude/prevent complications or other potentially serious medical conditions. Some abnormalities may not have been discussed or addressed during your ER visit. Some lab results may not return during your ER visit but can be accessible by downloading the free [facility]i or by visiting [email or web address] . It is important for you to review all labs/imaging/tests which are outside of the normal range with your physician.  An ER visit does not replace a primary care visit, and many screening tests or follow-up tests cannot be ordered by an ER doctor or performed by the ER. Some tests may even require pre-approval.  If you do not have a primary care doctor, you may contact the one listed on your discharge paperwork or you may also call the [facility] Appointment Desk at [phone] , or [facility] at  [phone] to schedule an appointment, or establish care with a primary care doctor or even a specialist and to obtain information about local resources. It is important to your health that you have a primary care doctor.  Please take all medications as directed. We have done our best to select a medication for you that will treat your condition however, all medications may potentially have side-effects and it is impossible to predict which medications may give you side-effects or what those side-effects (if any) those medications may give you.  If you feel that you are having a negative effect or side-effect of any medication you should stop taking those medications immediately and seek medical attention. If you feel that you are  having a life-threatening reaction call 911.  Do not drive, swim, climb to height, take a bath, operate heavy machinery, drink alcohol or take potentially sedating medications, sign any legal documents or make any important decisions for 24 hours if you have received any pain medications, sedatives or mood altering drugs during your ER visit or within 24 hours of taking them if they have been prescribed to you.   You can find additional resources for Dentists, hearing aids, durable medical equipment, low cost pharmacies and other resources at [email or web address]  Patient agrees with this plan. Discussed with her strict return precautions, they verbalized understanding. Patient is stable for discharge.   § Please take all medication as prescribed.

## 2024-06-28 NOTE — ED PROVIDER NOTES
Encounter Date: 6/28/2024    SCRIBE #1 NOTE: Shanice ARMAS am scribing for, and in the presence of,  Leonard Dutton PA-C. I have scribed the following portions of the note - Other sections scribed: HPI, ROS.       History     Chief Complaint   Patient presents with    Wound Check     Pt to Er with reports of boil to left upper arm x 1 week. PT denies drainage.      This is a 53 year old male, with a past medical history of DM 2, HTN, HLD, Colon adenocarcinoma, Septic arthritis of left foot, Traumatic amputation of fifth toe of right foot, who presents to the emergency department for wound check. Patient reports abscess to left shoulder (2-3 days ago). Patient states he was sent by his podiatrist Dr. Peterson for the wound in his foot as well as the painful abscess to his left shoulder that needs to be drained. Patient also states he takes Augmentin for his wound care.  Patient denies fever, chest pain, SOB, extremity weakness, numbness, paresthesia, or other associated symptoms. No other alleviating or exacerbating factors. This is the extent of the patient's complaints in the ED. NKDA.            The history is provided by the patient and medical records. No  was used.     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    Right foot    Colon adenocarcinoma 04/12/2022    Diabetic ketoacidosis without coma associated with type 2 diabetes mellitus 05/30/2017    Diabetic ulcer of right foot associated with type 2 diabetes mellitus 06/03/2015    Disorder of kidney and ureter     Essential hypertension 06/06/2013    Group B streptococcal infection 01/13/2017    Mixed hyperlipidemia 08/12/2014    Septic arthritis of left foot 04/28/2021    Shoulder impingement 08/12/2014    Subacute osteomyelitis of right foot 01/12/2017    Streptococcus agalactiae, Actinomyces odontolyticus    Traumatic amputation of fifth  toe of right foot 07/02/2015    Type 2 diabetes mellitus with diabetic neuropathy, with long-term current use of insulin 05/03/2016     Past Surgical History:   Procedure Laterality Date    COLONOSCOPY Right 1/19/2022    Procedure: COLONOSCOPY;  Surgeon: Tj Haile MD;  Location: [facility] ENDO (4TH FLR);  Service: Endoscopy;  Laterality: Right;  previous order un-usable/poor prep 1/18/22  RAPID COVID test arrival 12:20  instructions handed to pt-     COLONOSCOPY N/A 3/21/2022    Procedure: COLONOSCOPY;  Surgeon: Sheng Haque MD;  Location: [facility] (2ND FLR);  Service: Endoscopy;  Laterality: N/A;  Colonoscopy with AES for hepatix flexure polyp removal, 2nd floor  Pt is fully vaccinated-DS  Pt prescribed Plavix by Dr. Stahl in Jan. 2022, however pt states that he never started taking it-DS  3/16/22-Instructions sent via portal-DS    COLONOSCOPY N/A 9/13/2023    Procedure: COLONOSCOPY;  Surgeon: Erik Stout MD;  Location: [facility] (4TH FLR);  Service: Colon and Rectal;  Laterality: N/A;  Referred by Dr. Stout, timur, Meds, portal -ml    DEBRIDEMENT OF FOOT Left 7/14/2019    Procedure: DEBRIDEMENT, FOOT, with left 5th ray amputation;  Surgeon: Sis Hickman DPM;  Location: [facility] OR 2ND FLR;  Service: Podiatry;  Laterality: Left;    DEBRIDEMENT OF FOOT Left 7/17/2019    Procedure: DEBRIDEMENT, FOOT with leftt 5th ray partial amputation with Neox Graft;  Surgeon: Mai Burrell DPM;  Location: [facility] OR 2ND FLR;  Service: Podiatry;  Laterality: Left;  t    DEBRIDEMENT OF FOOT Bilateral 4/29/2021    Procedure: DEBRIDEMENT, FOOT;  Surgeon: Mai Burrell DPM;  Location: [facility] OR 1ST FLR;  Service: Podiatry;  Laterality: Bilateral;  Graft application    DEBRIDEMENT OF FOOT Left 7/31/2023    Procedure: DEBRIDEMENT, FOOT;  Surgeon: Marivel Peterson DPM;  Location: [facility] OR 2ND FLR;  Service: Podiatry;  Laterality: Left;    TOE AMPUTATION Right 06/05/2015    TOE AMPUTATION Right 01/19/2017     XI ROBOTIC COLECTOMY, RIGHT N/A 4/25/2022    Procedure: XI ROBOTIC COLECTOMY, RIGHT (lithotomy, eras low);  Surgeon: Erik Stout MD;  Location: [facility] OR 2ND FLR;  Service: Colon and Rectal;  Laterality: N/A;  Paruch to Goodwin    XI ROBOTIC COLECTOMY, RIGHT  4/25/2022    Procedure: ;  Surgeon: rEik Stout MD;  Location: [facility] OR 2ND FLR;  Service: Colon and Rectal;;     Family History   Adopted: Yes   Problem Relation Name Age of Onset    Hypertension Mother      Cancer Mother          breast    Diabetes Mother      Hypertension Father      Cataracts Father      Heart disease Father          mi    Diabetes Sister Michelle     No Known Problems Brother      Heart disease Sister Manuela         MI    No Known Problems Sister      Hypertension Maternal Aunt      No Known Problems Maternal Uncle      No Known Problems Paternal Aunt      No Known Problems Paternal Uncle      No Known Problems Maternal Grandmother      No Known Problems Maternal Grandfather      No Known Problems Paternal Grandmother      No Known Problems Paternal Grandfather      Amblyopia Neg Hx      Blindness Neg Hx      Glaucoma Neg Hx      Macular degeneration Neg Hx      Retinal detachment Neg Hx      Strabismus Neg Hx      Stroke Neg Hx      Thyroid disease Neg Hx       Social History     Tobacco Use    Smoking status: Never    Smokeless tobacco: Never   Substance Use Topics    Alcohol use: No    Drug use: No     Review of Systems   Constitutional:  Negative for chills, diaphoresis, fatigue and fever.   HENT:  Negative for congestion, ear discharge, ear pain, rhinorrhea, sore throat and trouble swallowing.    Eyes:  Negative for photophobia, redness and visual disturbance.   Respiratory:  Negative for cough and shortness of breath.    Cardiovascular:  Negative for chest pain, palpitations and leg swelling.   Gastrointestinal:  Negative for abdominal pain, diarrhea, nausea and vomiting.   Genitourinary:  Negative for difficulty  urinating, dysuria, flank pain, frequency and hematuria.   Musculoskeletal:  Negative for arthralgias, back pain, myalgias, neck pain and neck stiffness.   Skin:  Negative for pallor and rash.        (+) left shoulder abscess    Neurological:  Negative for dizziness, weakness (extremity), light-headedness, numbness and headaches.        (-) paresthesia     Hematological:  Does not bruise/bleed easily.       Physical Exam     Initial Vitals [06/28/24 0955]   BP Pulse Resp Temp SpO2   (!) 144/87 96 18 98.7 °F (37.1 °C) 97 %      MAP       --         Physical Exam    Nursing note and vitals reviewed.  Constitutional: He appears well-developed and well-nourished. He is not diaphoretic. He does not appear ill. No distress.   HENT:   Head: Normocephalic and atraumatic.   Right Ear: External ear normal.   Left Ear: External ear normal.   Nose: Nose normal.   Mouth/Throat: Uvula is midline and oropharynx is clear and moist.   Eyes: Conjunctivae and EOM are normal. Pupils are equal, round, and reactive to light. Right eye exhibits no discharge. Left eye exhibits no discharge. No scleral icterus.   Neck: Trachea normal. Neck supple.   Normal range of motion.   Full passive range of motion without pain.     Cardiovascular:  Normal rate, regular rhythm, normal heart sounds, intact distal pulses and normal pulses.     Exam reveals no distant heart sounds and no friction rub.       Pulmonary/Chest: Effort normal and breath sounds normal. No respiratory distress.   Abdominal: Abdomen is soft. Bowel sounds are normal. He exhibits no distension and no pulsatile midline mass. There is no abdominal tenderness.   No right CVA tenderness.  No left CVA tenderness. There is no rebound and no guarding.   Musculoskeletal:         General: Normal range of motion.      Right shoulder: Normal.      Left shoulder: Tenderness (over area of induration) present. No swelling, deformity, effusion, laceration, bony tenderness or crepitus. Normal  range of motion. Normal strength. Normal pulse.      Right elbow: Normal.      Left elbow: Normal.      Right wrist: Normal.      Left wrist: Normal.      Right hand: Normal.      Left hand: Normal.      Cervical back: Normal, full passive range of motion without pain, normal range of motion and neck supple.      Thoracic back: Normal.      Lumbar back: Normal.      Right hip: Normal.      Left hip: Normal.      Right knee: Normal.      Left knee: Normal.      Right lower leg: Normal.      Left lower leg: Normal.      Right ankle: Normal.      Left ankle: Normal.      Right foot: Normal.      Left foot: Normal.      Comments: Focused exam of the left shoulder:  Full range of motion with 5/5 strength neurovascularly intact 2+ distal pulses.      Neurological: He is alert and oriented to person, place, and time. He has normal strength. No cranial nerve deficit or sensory deficit. Coordination and gait normal.   Skin: Skin is warm and dry. Capillary refill takes less than 2 seconds. No bruising, no ecchymosis and no rash noted. There is erythema.   Circular area to left deltoid of erythema with induration no noticeable fluctuance to palpation.  Ultrasound performed noted possibly 1-2 cm size pocket of fluid.    Psychiatric: He has a normal mood and affect. His speech is normal and behavior is normal. Thought content normal.         ED Course   I & D - Incision and Drainage    Date/Time: 6/28/2024 12:47 PM  Location procedure was performed: [facility] EMERGENCY DEPARTMENT    Performed by: Leonard Dutton PA-C  Authorized by: Jw Richardson MD  Pre-operative diagnosis: abscess  Post-operative diagnosis: abscess  Type: abscess  Body area: upper extremity  Location details: left shoulder  Anesthesia: local infiltration    Anesthesia:  Local Anesthetic: lidocaine 1% without epinephrine  Scalpel size: 11  Incision type: single straight  Complexity: simple  Drainage: bloody  Drainage amount: scant  Wound treatment:  incision, drainage and wound left open  Complications: No  Specimens: No  Implants: No  Patient tolerance: Patient tolerated the procedure well with no immediate complications  Comments: Area cleaned with soap and water and iodine.  Local anesthesia using 1% lidocaine achieved.  Incision made.  Bloody drainage unable to express any material.  Wound left open mupirocin applied bandage.  Patient tolerated procedure well without acute complications.  Advised patient to follow up with the primary care physician 1-2 days for re-evaluation wound check.  Return precautions given for any new or worsening symptoms.  Wound care instructions given.        Labs Reviewed   POCT GLUCOSE - Abnormal; Notable for the following components:       Result Value    POCT Glucose 214 (*)     All other components within normal limits   CULTURE, AEROBIC  (SPECIFY SOURCE)          Imaging Results    None          Medications   LIDOcaine HCL 10 mg/ml (1%) injection 5 mL (5 mLs Infiltration Given by Provider 6/28/24 1101)   mupirocin 2 % ointment 1 Tube (1 Tube Topical (Top) Given 6/28/24 1102)   sulfamethoxazole-trimethoprim 800-160mg per tablet 2 tablet (2 tablets Oral Given 6/28/24 1100)     Medical Decision Making  This is a 53 year old male, with a past medical history of DM 2, HTN, HLD, Colon adenocarcinoma, Septic arthritis of left foot, Traumatic amputation of fifth toe of right foot, who presents to the emergency department for wound check. Patient reports abscess to left shoulder (2-3 days ago). Patient states he was sent by his podiatrist Dr. Peterson for the wound in his foot as well as the painful abscess to his left shoulder that needs to be drained. Patient also states he takes Augmentin for his wound care.  Patient denies fever, chest pain, SOB, extremity weakness, numbness, paresthesia, or other associated symptoms. No other alleviating or exacerbating factors. This is the extent of the patient's complaints in the ED. NKDA.        Patient's chart and medical history reviewed.  Patient's vitals reviewed.  They are afebrile, no respiratory distress, nontoxic-appearing in the ED.  Differential diagnosis considered abscess, osteomyelitis, septic arthritis cyst.  Patient was full range of motion of the affected extremity neurovascularly intact.  Appears to be localized area of cellulitis with induration.  I&D procedure attempted due to ultrasound 1-2 cm pocket, I&D per procedure note above.  Patient tolerated procedure well.  Mupirocin applied.  In addition to patient's Augmentin treatment course for his foot wound will additionally add Bactrim for 10 days.  Instructed patient to follow up with his primary care physician in 1-2 days for wound re-evaluation return precautions given for any new or worsening symptoms.  This patient was discussed with an also evaluated by my attending Dr. Richardson who assisted in plan and care development.  At this time I'll discharge home to follow up with primary care physician in the next 1-2 days for further evaluation.  If the symptoms continue the pt will need to see ED for further evaluation.  The patient is comfortable with this plan and comfortable going home at this time. After taking into careful account the historical factors and physical exam findings of the patient's presentation today, in conjunction with the empirical and objective data obtained on ED workup, no acute emergent medical condition has been identified. The patient appears to be low risk for an emergent medical condition and I feel it is safe and appropriate at this time for the patient to be discharged to follow-up as detailed in their discharge instructions for reevaluation and possible continued outpatient workup and management. I have discussed the specifics of the workup with the patient and the patient has verbalized understanding of the details of the workup, the diagnosis, the treatment plan, and the need for outpatient  follow-up.  Although the patient has no emergent etiology today this does not preclude the development of an emergent condition so in addition, I have advised the patient that they can return to the ED and/or activate EMS at any time with worsening of their symptoms, change of their symptoms, or with any other medical complaint.  The patient remained comfortable and stable during their visit in the ED.  Discharge and follow-up instructions discussed with the patient who expressed understanding and willingness to comply with my recommendations. I discussed with the patient/family the diagnosis, treatment plan, indications for return to the emergency department, and for expected follow-up. I again reiterated to please follow up with their primary doctor in 1-2 days and return to the ED in any new, worsening, or continued symptoms. The patient/family verbalized an understanding. The patient/family is asked if there are any questions or concerns. We discuss the case, until all issues are addressed to the patient/family's satisfaction. Patient/family understands and is agreeable to the plan.   LEONARD HODGE PA-C    DISCLAIMER: This note was prepared with [employer] voice recognition transcription software. Garbled syntax, mangled pronouns, and other bizarre constructions may be attributed to that software system.          Amount and/or Complexity of Data Reviewed  External Data Reviewed: notes.     Details: See HPI.    Risk  Prescription drug management.            Scribe Attestation:   Scribe #1: I performed the above scribed service and the documentation accurately describes the services I performed. I attest to the accuracy of the note.           I, Leonard Hodge PA-C, personally performed the services described in this documentation. All medical record entries made by the scribe were at my direction and in my presence.  I have reviewed the chart and agree that the record reflects my personal performance and is  accurate and complete.                      Clinical Impression:  Final diagnoses:  [L03.114] Cellulitis of left upper extremity (Primary)          ED Disposition Condition    Discharge Stable          ED Prescriptions       Medication Sig Dispense Start Date End Date Auth. Provider    sulfamethoxazole-trimethoprim 800-160mg (BACTRIM DS) 800-160 mg Tab Take 1 tablet by mouth 2 (two) times daily for 10 days 20 tablet 6/28/2024 7/8/2024 Leonard Dutton PA-C          Follow-up Information       Follow up With Specialties Details Why Contact Info    Lorena Thakur MD Internal Medicine Schedule an appointment as soon as possible for a visit in 1 day for follow up [address]  [place] [address]  [phone]               Leonard Dutton PA-C  06/28/24 1246       Leonard Dutton PA-C  06/28/24 1244

## 2024-06-29 LAB — BACTERIA SPEC AEROBE CULT: NO GROWTH

## 2024-07-01 ENCOUNTER — PATIENT OUTREACH (OUTPATIENT)
Dept: EMERGENCY MEDICINE | Facility: HOSPITAL | Age: 54
End: 2024-07-01
Payer: MEDICARE

## 2024-07-01 NOTE — PROGRESS NOTES
Reminded patient of upcoming virtual appointment on 7/2/24 at 10:30 with Maria Dolores Tan MD [facility], Infectious Diseases [address] [place] [id number] [phone].  Kelly Barreto

## 2024-07-01 NOTE — PROGRESS NOTES
Reminded patient of upcoming appointment on 7/2/24 at 4:00 with Wound Care nurse [facility], Wound Care [address], [facility] [place] [address] [phone].  Kelly Barreto

## 2024-07-01 NOTE — PROGRESS NOTES
Patient was seen in the ED on 6/28/24. Phoned patient to assist with Post ED Discharge Navigation. Patient has a follow-up appointment with Infectious Disease and Wound Care.   Kelly Barreto

## 2024-07-02 ENCOUNTER — TELEPHONE (OUTPATIENT)
Dept: INFECTIOUS DISEASES | Facility: CLINIC | Age: 54
End: 2024-07-02

## 2024-07-02 ENCOUNTER — OFFICE VISIT (OUTPATIENT)
Dept: INFECTIOUS DISEASES | Facility: CLINIC | Age: 54
End: 2024-07-02
Payer: MEDICARE

## 2024-07-02 ENCOUNTER — LAB VISIT (OUTPATIENT)
Dept: LAB | Facility: HOSPITAL | Age: 54
End: 2024-07-02
Attending: INTERNAL MEDICINE
Payer: MEDICARE

## 2024-07-02 ENCOUNTER — HOSPITAL ENCOUNTER (OUTPATIENT)
Dept: WOUND CARE | Facility: HOSPITAL | Age: 54
Discharge: HOME | End: 2024-07-02
Attending: PODIATRIST
Payer: MEDICARE

## 2024-07-02 VITALS — HEART RATE: 108 BPM | SYSTOLIC BLOOD PRESSURE: 127 MMHG | TEMPERATURE: 98 F | DIASTOLIC BLOOD PRESSURE: 76 MMHG

## 2024-07-02 DIAGNOSIS — E11.621 DIABETIC ULCER OF LEFT MIDFOOT ASSOCIATED WITH TYPE 2 DIABETES MELLITUS, WITH BONE INVOLVEMENT WITHOUT EVIDENCE OF NECROSIS: ICD-10-CM

## 2024-07-02 DIAGNOSIS — M86.60 ACUTE ON CHRONIC OSTEOMYELITIS: Primary | ICD-10-CM

## 2024-07-02 DIAGNOSIS — L97.426 DIABETIC ULCER OF LEFT MIDFOOT ASSOCIATED WITH TYPE 2 DIABETES MELLITUS, WITH BONE INVOLVEMENT WITHOUT EVIDENCE OF NECROSIS: Primary | ICD-10-CM

## 2024-07-02 DIAGNOSIS — E11.621 DIABETIC ULCER OF LEFT MIDFOOT ASSOCIATED WITH TYPE 2 DIABETES MELLITUS, WITH BONE INVOLVEMENT WITHOUT EVIDENCE OF NECROSIS: Primary | ICD-10-CM

## 2024-07-02 DIAGNOSIS — M86.10 ACUTE ON CHRONIC OSTEOMYELITIS: Primary | ICD-10-CM

## 2024-07-02 DIAGNOSIS — L02.414 ABSCESS OF LEFT ARM: ICD-10-CM

## 2024-07-02 DIAGNOSIS — M86.672 CHRONIC OSTEOMYELITIS OF LEFT FOOT: ICD-10-CM

## 2024-07-02 DIAGNOSIS — E11.49 TYPE II DIABETES MELLITUS WITH NEUROLOGICAL MANIFESTATIONS: ICD-10-CM

## 2024-07-02 DIAGNOSIS — E11.621 TYPE 2 DIABETES MELLITUS WITH FOOT ULCER (CODE): Primary | ICD-10-CM

## 2024-07-02 DIAGNOSIS — L97.426 DIABETIC ULCER OF LEFT MIDFOOT ASSOCIATED WITH TYPE 2 DIABETES MELLITUS, WITH BONE INVOLVEMENT WITHOUT EVIDENCE OF NECROSIS: ICD-10-CM

## 2024-07-02 LAB
ALBUMIN SERPL BCP-MCNC: 3 G/DL (ref 3.5–5.2)
ALP SERPL-CCNC: 188 U/L (ref 55–135)
ALT SERPL W/O P-5'-P-CCNC: 10 U/L (ref 10–44)
ANION GAP SERPL CALC-SCNC: 12 MMOL/L (ref 8–16)
AST SERPL-CCNC: 9 U/L (ref 10–40)
BASOPHILS # BLD AUTO: 0.02 K/UL (ref 0–0.2)
BASOPHILS NFR BLD: 0.4 % (ref 0–1.9)
BILIRUB SERPL-MCNC: 0.5 MG/DL (ref 0.1–1)
BUN SERPL-MCNC: 12 MG/DL (ref 6–20)
CALCIUM SERPL-MCNC: 10 MG/DL (ref 8.7–10.5)
CHLORIDE SERPL-SCNC: 102 MMOL/L (ref 95–110)
CO2 SERPL-SCNC: 25 MMOL/L (ref 23–29)
CREAT SERPL-MCNC: 1.4 MG/DL (ref 0.5–1.4)
CRP SERPL-MCNC: 40.1 MG/L (ref 0–8.2)
DIFFERENTIAL METHOD BLD: ABNORMAL
EOSINOPHIL # BLD AUTO: 0 K/UL (ref 0–0.5)
EOSINOPHIL NFR BLD: 0.7 % (ref 0–8)
ERYTHROCYTE [DISTWIDTH] IN BLOOD BY AUTOMATED COUNT: 12.8 % (ref 11.5–14.5)
ERYTHROCYTE [SEDIMENTATION RATE] IN BLOOD BY PHOTOMETRIC METHOD: >120 MM/HR (ref 0–23)
EST. GFR  (NO RACE VARIABLE): 59 ML/MIN/1.73 M^2
GLUCOSE SERPL-MCNC: 339 MG/DL (ref 70–110)
HCT VFR BLD AUTO: 40.2 % (ref 40–54)
HGB BLD-MCNC: 12.5 G/DL (ref 14–18)
IMM GRANULOCYTES # BLD AUTO: 0.01 K/UL (ref 0–0.04)
IMM GRANULOCYTES NFR BLD AUTO: 0.2 % (ref 0–0.5)
LYMPHOCYTES # BLD AUTO: 1.3 K/UL (ref 1–4.8)
LYMPHOCYTES NFR BLD: 24.9 % (ref 18–48)
MCH RBC QN AUTO: 28 PG (ref 27–31)
MCHC RBC AUTO-ENTMCNC: 31.1 G/DL (ref 32–36)
MCV RBC AUTO: 90 FL (ref 82–98)
MONOCYTES # BLD AUTO: 0.6 K/UL (ref 0.3–1)
MONOCYTES NFR BLD: 10.2 % (ref 4–15)
NEUTROPHILS # BLD AUTO: 3.4 K/UL (ref 1.8–7.7)
NEUTROPHILS NFR BLD: 63.6 % (ref 38–73)
NRBC BLD-RTO: 0 /100 WBC
PLATELET # BLD AUTO: 346 K/UL (ref 150–450)
PMV BLD AUTO: 9.7 FL (ref 9.2–12.9)
POTASSIUM SERPL-SCNC: 4 MMOL/L (ref 3.5–5.1)
PROT SERPL-MCNC: 8.3 G/DL (ref 6–8.4)
RBC # BLD AUTO: 4.47 M/UL (ref 4.6–6.2)
SODIUM SERPL-SCNC: 139 MMOL/L (ref 136–145)
WBC # BLD AUTO: 5.38 K/UL (ref 3.9–12.7)

## 2024-07-02 PROCEDURE — 80053 COMPREHEN METABOLIC PANEL: CPT | Performed by: INTERNAL MEDICINE

## 2024-07-02 PROCEDURE — 85025 COMPLETE CBC W/AUTO DIFF WBC: CPT | Performed by: INTERNAL MEDICINE

## 2024-07-02 PROCEDURE — 86140 C-REACTIVE PROTEIN: CPT | Performed by: INTERNAL MEDICINE

## 2024-07-02 PROCEDURE — 99213 OFFICE O/P EST LOW 20 MIN: CPT

## 2024-07-02 PROCEDURE — 36415 COLL VENOUS BLD VENIPUNCTURE: CPT | Performed by: INTERNAL MEDICINE

## 2024-07-02 PROCEDURE — 85652 RBC SED RATE AUTOMATED: CPT | Performed by: INTERNAL MEDICINE

## 2024-07-02 NOTE — PROGRESS NOTES
"[facility]  [address]   [place]  [address]  Nurse Visit    Subjective:       Patient seen in clinic today. Patient ambulated unaided to [facility]. Prescribed CAM boot on LLE, he states he "forgot" his knee scooter in the car. Patient denies fever, chills, nausea, vomiting or diarrhea at present. Patient denies pain or discomfort to wound beds at present. Patient reports not having any issues with dressings since last visit. He states he spoke with ID regarding ABX therapy and new abscess. He states they will have him do labs today and then possibly change him from Augmentin to Doxycycline pending result. He reports taking current ABX as ordered without complaint. Dressings appear intact, there is a moderate amount of strike through drainage to Lateral/Plantar aspect of Left Foot. Tubigrip is well-placed, patient reports double layer was tolerable. Dressings removed & wounds cleaned by nurse. Consulted with MD on duty, applied Iodoflex and Mepilex border dressing to Left Deltoid after cleansing with Hibiclens/Normal Saline. Dressing changed as ordered.      Assessment:          (Retired) Wound 04/08/22 1137 Diabetic Ulcer Left plantar;lateral Foot (Active)   04/08/22 1137    Pre-existing: Yes   Primary Wound Type: Diabetic ulc   Side: Left   Orientation: plantar;lateral   Location: Foot   Wound Number:    Ankle-Brachial Index:    Pulses:    Removal Indication and Assessment:    Wound Outcome:    (Retired) Wound Type:    (Retired) Wound Length (cm):    (Retired) Wound Width (cm):    (Retired) Depth (cm):    Wound Description (Comments):    Removal Indications:    Wound Image   07/02/24 1638   Wound WDL ex 07/02/24 1638   Dressing Appearance Intact;Moist drainage;Area marked 07/02/24 1638   Drainage Amount Large 07/02/24 1638   Drainage Characteristics/Odor Serosanguineous 07/02/24 1638   Appearance Pink;Tendon;Moist 07/02/24 1638   Tissue loss description Full thickness 07/02/24 1638 "   Black (%), Wound Tissue Color 0 % 07/02/24 1638   Red (%), Wound Tissue Color 85 % 07/02/24 1638   Yellow (%), Wound Tissue Color 15 % 07/02/24 1638   Periwound Area Pale white;Macerated 07/02/24 1638   Wound Edges Defined;Open 07/02/24 1638   Care Cleansed with:;Antimicrobial agent;Sterile normal saline;Wound cleanser 07/02/24 1638   Dressing Changed 07/02/24 1638   Periwound Care Moisture barrier applied;Absorptive dressing applied 07/02/24 1638   Compression Tubular elasticized bandage 07/02/24 1638   Off Loading Football dressing;Off loading shoe 07/02/24 1638   Dressing Change Due 07/05/24 07/02/24 1638            Wound 06/25/24 Abscess Left upper Arm (Active)   06/25/24    Present on Original Admission: Y   Primary Wound Type: Abscess   Side: Left   Orientation: upper   Location: Arm   Wound Approximate Age at First Assessment (Weeks):    Wound Number:    Is this injury device related?:    Incision Type:    Closure Method:    Wound Description (Comments):    Type:    Additional Comments:    Ankle-Brachial Index:    Pulses:    Removal Indication and Assessment:    Wound Outcome:    Wound Image   07/02/24 1638   Dressing Appearance Intact;Moist drainage 07/02/24 1638   Drainage Amount Small 07/02/24 1638   Drainage Characteristics/Odor Serosanguineous;Creamy 07/02/24 1638   Appearance Pink;Yellow;Moist 07/02/24 1638   Tissue loss description Full thickness 07/02/24 1638   Black (%), Wound Tissue Color 0 % 07/02/24 1638   Red (%), Wound Tissue Color 50 % 07/02/24 1638   Yellow (%), Wound Tissue Color 50 % 07/02/24 1638   Periwound Area Hemosiderin Staining;Swelling 07/02/24 1638   Wound Edges Open 07/02/24 1638   Care Cleansed with:;Antimicrobial agent;Sterile normal saline;Wound cleanser 07/02/24 1638   Dressing Changed 07/02/24 1638   Periwound Care Skin barrier film applied;Absorptive dressing applied 07/02/24 1638   Dressing Change Due 07/05/24 07/02/24 1638           Plan:     Wound Dressing Orders      "  Dressing change frequency twice a week (prn nurse visit)   Remove old dressing   Cleanse or irrigate with: Hibiclens and Normal Saline     Moisturize dry skin on leg with Enlivaderm     Protect periwound with:  Gentian Violet hugging wound bed, Cavilon/Benzoin where custom pad will be applied to plantar foot   Primary dressing: WOUND BASE: Promogran to wound bed   Secondary dressing: Custom Felt pad (cut to cover entire plantar foot with c-shaped open to allow for drainage), Drawtex (cut to size of felt opening, 2 layers) wicking to Mextra 5" x 9" laid vertically.  Optifoam Ag foam non-border.   Compression/Off-load: Football (cast padding x 1 1/2 rolls, 1/2 roll fan folded to plantar for cushion) - open toe. Tubigrip, size D, double layer (calf size 34 cm) CAM Boot on the affected foot           Follow up in about 3 days (around 7/5/2024) for Wound Care.          "

## 2024-07-02 NOTE — PROGRESS NOTES
The patient location is: home  The chief complaint leading to consultation is: osteo L heel    Visit type: audiovisual    Face to Face time with patient: 15 mins  30 minutes of total time spent on the encounter, which includes face to face time and non-face to face time preparing to see the patient (eg, review of tests), Obtaining and/or reviewing separately obtained history, Documenting clinical information in the electronic or other health record, Independently interpreting results (not separately reported) and communicating results to the patient/family/caregiver, or Care coordination (not separately reported).         Each patient to whom he or she provides medical services by telemedicine is:  (1) informed of the relationship between the physician and patient and the respective role of any other health care provider with respect to management of the patient; and (2) notified that he or she may decline to receive medical services by telemedicine and may withdraw from such care at any time.    Notes:     Infectious Disease Clinic Note  09/11/23      Subjective:       Patient ID: Indio Ryder Jr. is a 53 y.o. male being seen for a followup visit.    Chief Complaint: No chief complaint on file.    HPI     Mr. Ryder is a 53 y/o M with hx DM and numerous episodes of foot osteo presents for f/up of Lt foot chronic osteomyelitis.    Interval hx 7/2/24:  Last seen 5/30 for chronic L heel osteo. Reports med adherence to po augmentin and tolerating well. Denies fevers, chills, diarrhea. Reports wound is improving. Reports that he was told bone was no longer exposed at last wound care appointment.     Of note, he had a recent ED visit 6/28 for L shoulder abscess s/p I+D. Prescribed bactrim. Of note, wound care had obtained a culture on 6/25 that had no growth.      Additional hx :  Last seen on 10/11/2023 by Damien Richey. He was prescribed a 6 month course of augmentin for chronic osteo, but pt reported he had  stopped taking med. Bone Biopsy 5/17/24 revealed chronic osteomyelitis; bone cx grew enterococcus faecalis.    A previous wound cx 4/2/24 grew pseudomonas and klebsiella. On 5/17 he was prescribed levofloxacin 750mg - taking every other day.   Cr 1.2 on recent labs. Denies signs or symptoms of systemic infection.     Recent Imaging  6/24 arterial US:   Peripheral arterial disease with monophasic and biphasic waveforms in the left lower extremity.  No focal hemodynamically significant arterial stenosis        Family History   Adopted: Yes   Problem Relation Name Age of Onset    Hypertension Mother      Cancer Mother          breast    Diabetes Mother      Hypertension Father      Cataracts Father      Heart disease Father          mi    Diabetes Sister Michelle     No Known Problems Brother      Heart disease Sister Manuela         MI    No Known Problems Sister      Hypertension Maternal Aunt      No Known Problems Maternal Uncle      No Known Problems Paternal Aunt      No Known Problems Paternal Uncle      No Known Problems Maternal Grandmother      No Known Problems Maternal Grandfather      No Known Problems Paternal Grandmother      No Known Problems Paternal Grandfather      Amblyopia Neg Hx      Blindness Neg Hx      Glaucoma Neg Hx      Macular degeneration Neg Hx      Retinal detachment Neg Hx      Strabismus Neg Hx      Stroke Neg Hx      Thyroid disease Neg Hx       Social History     Socioeconomic History    Marital status: Single    Number of children: 0   Occupational History    Occupation: Retired     Employer: [employer]   Tobacco Use    Smoking status: Never    Smokeless tobacco: Never   Substance and Sexual Activity    Alcohol use: No    Drug use: No    Sexual activity: Yes     Partners: Female     Birth control/protection: Condom     Social Determinants of Health     Financial Resource Strain: Low Risk  (7/2/2024)    Overall Financial Resource Strain (CARDIA)     Difficulty of Paying Living  Expenses: Not hard at all   Food Insecurity: No Food Insecurity (7/2/2024)    Hunger Vital Sign     Worried About Running Out of Food in the Last Year: Never true     Ran Out of Food in the Last Year: Never true   Transportation Needs: No Transportation Needs (7/13/2023)    PRAPARE - Transportation     Lack of Transportation (Medical): No     Lack of Transportation (Non-Medical): No   Physical Activity: Inactive (7/2/2024)    Exercise Vital Sign     Days of Exercise per Week: 0 days     Minutes of Exercise per Session: 0 min   Stress: No Stress Concern Present (7/13/2023)    [employer] - Occupational Stress Questionnaire     Feeling of Stress : Not at all   Housing Stability: Low Risk  (7/13/2023)    Housing Stability Vital Sign     Unable to Pay for Housing in the Last Year: No     Number of Places Lived in the Last Year: 1     Unstable Housing in the Last Year: No     Past Surgical History:   Procedure Laterality Date    COLONOSCOPY Right 1/19/2022    Procedure: COLONOSCOPY;  Surgeon: Tj Haile MD;  Location: [facility] (4TH FLR);  Service: Endoscopy;  Laterality: Right;  previous order un-usable/poor prep 1/18/22  RAPID COVID test arrival 12:20  instructions handed to pt-     COLONOSCOPY N/A 3/21/2022    Procedure: COLONOSCOPY;  Surgeon: Sheng Haque MD;  Location: [facility] (2ND FLR);  Service: Endoscopy;  Laterality: N/A;  Colonoscopy with AES for hepatix flexure polyp removal, 2nd floor  Pt is fully vaccinated-DS  Pt prescribed Plavix by Dr. Stahl in Jan. 2022, however pt states that he never started taking it-DS  3/16/22-Instructions sent via portal-DS    COLONOSCOPY N/A 9/13/2023    Procedure: COLONOSCOPY;  Surgeon: Erik Stout MD;  Location: [facility] ENDO (4TH FLR);  Service: Colon and Rectal;  Laterality: N/A;  Referred by timur Saunders, [facility], portal -ml    DEBRIDEMENT OF FOOT Left 7/14/2019    Procedure: DEBRIDEMENT, FOOT, with left 5th ray amputation;   Surgeon: Sis Hickman DPM;  Location: [facility] OR 2ND FLR;  Service: Podiatry;  Laterality: Left;    DEBRIDEMENT OF FOOT Left 7/17/2019    Procedure: DEBRIDEMENT, FOOT with leftt 5th ray partial amputation with Neox Graft;  Surgeon: Mai Burrell DPM;  Location: [facility] OR 2ND FLR;  Service: Podiatry;  Laterality: Left;  t    DEBRIDEMENT OF FOOT Bilateral 4/29/2021    Procedure: DEBRIDEMENT, FOOT;  Surgeon: Mai Burrell DPM;  Location: [facility] OR 1ST FLR;  Service: Podiatry;  Laterality: Bilateral;  Graft application    DEBRIDEMENT OF FOOT Left 7/31/2023    Procedure: DEBRIDEMENT, FOOT;  Surgeon: Marivel Peterson DPM;  Location: [facility] OR 2ND FLR;  Service: Podiatry;  Laterality: Left;    TOE AMPUTATION Right 06/05/2015    TOE AMPUTATION Right 01/19/2017    XI ROBOTIC COLECTOMY, RIGHT N/A 4/25/2022    Procedure: XI ROBOTIC COLECTOMY, RIGHT (lithotomy, eras low);  Surgeon: Erik Stout MD;  [facility];  Service: Colon and Rectal;  Laterality: N/A;  Paruch to [place]    XI ROBOTIC COLECTOMY, RIGHT  4/25/2022    Procedure: ;  Surgeon: Erik Stout MD;  Location: [facility] OR 2ND FLR;  Service: Colon and Rectal;;       Patient's Medications   New Prescriptions    No medications on file   Previous Medications    AMOXICILLIN-CLAVULANATE 875-125MG (AUGMENTIN) 875-125 MG PER TABLET    Take 1 tablet by mouth 2 (two) times a day.    ATORVASTATIN (LIPITOR) 80 MG TABLET    Take 1 tablet (80 mg total) by mouth once daily.    EMPAGLIFLOZIN (JARDIANCE) 25 MG TABLET    Take 1 tablet (25 mg total) by mouth once daily.    INSULIN ASPART U-100 (NOVOLOG) 100 UNIT/ML (3 ML) INPN PEN    Inject if sugar is > 180 up to 4x a day , 180-230+2, 231-280+4, 281-330+6, 331-380+8, >380+10. Max daily 40 units    INSULIN GLARGINE U-300 CONC (TOUJEO MAX U-300 SOLOSTAR) 300 UNIT/ML (3 ML) INSULIN PEN    Inject 26 to 32 units under the skin at night    ONETOUCH DELICA LANCETS 33 GAUGE [facility]        ONETOUCH ULTRA2 KIT    "     PEN NEEDLE, DIABETIC (BD SASCHA 2ND GEN PEN NEEDLE) 32 GAUGE X 5/32" NDLE    Use 4 times a day    SULFAMETHOXAZOLE-TRIMETHOPRIM 800-160MG (BACTRIM DS) 800-160 MG TAB    Take 1 tablet by mouth 2 (two) times daily for 10 days    TOBRAMYCIN (NEBCIN) 1.2 GRAM INJECTION    Apply 1 g topically daily as needed (apply as per DPM order). Apply to wound bed   Modified Medications    No medications on file   Discontinued Medications    No medications on file       Patient Active Problem List    Diagnosis Date Noted    Blister (nonthermal), left foot, sequela 02/16/2024    Blister (nonthermal), left foot, initial encounter 01/26/2024    Equinus deformity of both feet 11/17/2023    H/O amputation of lesser toe, right 11/17/2023    Right great toe amputee 11/17/2023    Chronic osteomyelitis of left foot 10/20/2023    Type II diabetes mellitus with neurological manifestations 09/29/2023    Eversion deformity of foot, left 09/15/2023    Hx of amputation of lesser toe, left 09/15/2023    Pre-ulcerative calluses 06/30/2023    Chronic osteomyelitis of right foot 12/09/2022    Diabetic ulcer of right foot associated with type 2 diabetes mellitus, with fat layer exposed 04/26/2022    History of colon cancer 04/12/2022    Acute on chronic osteomyelitis 04/29/2021    Diabetic ulcer of left foot 04/28/2021    Septic arthritis of left foot 04/28/2021    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 edema, with long-term current use of insulin 03/23/2021    Chronic left shoulder pain 07/30/2020    Allergic reaction due to antibacterial drug 04/05/2020    Subacute osteomyelitis of left foot 03/11/2020    Hypertensive retinopathy, bilateral 01/28/2020    Diabetic ulcer of left midfoot associated with type 2 diabetes mellitus, with bone involvement without evidence of necrosis 08/02/2019    Diabetic ulcer of right midfoot associated with type 2 diabetes mellitus, with necrosis " "of bone 07/12/2019    Neck pain 11/08/2017    Hypokalemia 05/30/2017    Actinomyces infection 01/17/2017     Right foot      Type 2 diabetes mellitus with hyperglycemia, with long-term current use of insulin 05/03/2016    Mixed hyperlipidemia 08/12/2014    Essential hypertension 06/06/2013       Review of Systems   Review of Systems   Constitutional:  Negative for chills, fever and malaise/fatigue.   Respiratory:  Negative for cough and shortness of breath.    Cardiovascular:  Negative for chest pain and orthopnea.   Gastrointestinal:  Negative for abdominal pain, diarrhea and vomiting.   Genitourinary:  Negative for dysuria.   Musculoskeletal:  Negative for back pain and myalgias.        L foot wound   Skin:         L shoulder erythema+  L heel ulcer+   Neurological:  Negative for sensory change and weakness.           Objective:      There were no vitals taken for this visit.  Estimated body mass index is 29.03 kg/m² as calculated from the following:    Height as of 6/28/24: 6' 1" (1.854 m).    Weight as of 6/28/24: 99.8 kg (220 lb).    Physical Exam  Constitutional:       Appearance: Normal appearance.   Pulmonary:      Effort: Pulmonary effort is normal.   Neurological:      Mental Status: He is alert.           Assessment:         1. Acute on chronic osteomyelitis        2. Chronic osteomyelitis of left foot        3. Diabetic ulcer of left midfoot associated with type 2 diabetes mellitus, with bone involvement without evidence of necrosis                Plan:         Diabetic ulcer of left midfoot associated with type 2 diabetes mellitus, with fat layer exposed  54y/o M with hx of LLE DM foot ulcers c/b osteo s/p numerous courses of IV and PO antibiotics. Last seen in ID clinic in 10/2023.  At the time were suspecting recurrent e faecalis due to chronic osteo and plan was for 6 mths  Augmentin 875mg po bid. New bone cx from 5/17/24 again growing e faecalis with path neg for acute osteo. Showed chronic osteo. " Suspect due to nonadherence not antibiotic failure. Arterial US w/o evidence of significant stenosis.    Plan:   Recheck labs to monitor for improvement and drug toxicity  Continue  augmentin to target e faecalis- (on week 5) duration atleast 6 wks, but to be extended until infection resolves and bone no longer exposed.  Discussed with patient that if not improving on oral abx may need picc line placement for IV abx.  Continue local woundcare and f/u with podiatry      Lt shoulder cellulitis  Hx of boil s/p I+D. Cx 6/25 neg. On bactrim.   --if not improving consider repeating culture and switching to doxycycline pending results  --cmp to assess renal function on bactrim    Rtc in 2-3 wks  Maria Dolores Tan MD  Infectious Disease     Total professional time spent for the encounter: 40 minutes  Time was spent preparing to see the patient, reviewing results of prior testing, obtaining and/or reviewing separately obtained history, performing a medically appropriate examination and interview, counseling and educating the patient/family, ordering medications/tests/procedures, referring and communicating with other health care professionals, documenting clinical information in the electronic health record, and independently interpreting results.

## 2024-07-03 ENCOUNTER — PATIENT MESSAGE (OUTPATIENT)
Dept: INFECTIOUS DISEASES | Facility: CLINIC | Age: 54
End: 2024-07-03
Payer: MEDICARE

## 2024-07-03 DIAGNOSIS — E11.65 TYPE 2 DIABETES MELLITUS WITH HYPERGLYCEMIA, WITH LONG-TERM CURRENT USE OF INSULIN: Primary | ICD-10-CM

## 2024-07-03 DIAGNOSIS — Z79.4 TYPE 2 DIABETES MELLITUS WITH HYPERGLYCEMIA, WITH LONG-TERM CURRENT USE OF INSULIN: Primary | ICD-10-CM

## 2024-07-05 ENCOUNTER — HOSPITAL ENCOUNTER (OUTPATIENT)
Dept: WOUND CARE | Facility: HOSPITAL | Age: 54
Discharge: HOME | End: 2024-07-05
Attending: PODIATRIST
Payer: MEDICARE

## 2024-07-05 VITALS
SYSTOLIC BLOOD PRESSURE: 147 MMHG | TEMPERATURE: 97 F | RESPIRATION RATE: 18 BRPM | HEART RATE: 96 BPM | DIASTOLIC BLOOD PRESSURE: 85 MMHG

## 2024-07-05 DIAGNOSIS — L97.426 DIABETIC ULCER OF LEFT MIDFOOT ASSOCIATED WITH TYPE 2 DIABETES MELLITUS, WITH BONE INVOLVEMENT WITHOUT EVIDENCE OF NECROSIS: Primary | ICD-10-CM

## 2024-07-05 DIAGNOSIS — E11.49 TYPE II DIABETES MELLITUS WITH NEUROLOGICAL MANIFESTATIONS: ICD-10-CM

## 2024-07-05 DIAGNOSIS — E11.621 DIABETIC ULCER OF LEFT MIDFOOT ASSOCIATED WITH TYPE 2 DIABETES MELLITUS, WITH BONE INVOLVEMENT WITHOUT EVIDENCE OF NECROSIS: Primary | ICD-10-CM

## 2024-07-05 DIAGNOSIS — M86.60 ACUTE ON CHRONIC OSTEOMYELITIS: ICD-10-CM

## 2024-07-05 DIAGNOSIS — M86.10 ACUTE ON CHRONIC OSTEOMYELITIS: ICD-10-CM

## 2024-07-05 PROCEDURE — 87077 CULTURE AEROBIC IDENTIFY: CPT | Performed by: PODIATRIST

## 2024-07-05 PROCEDURE — 87070 CULTURE OTHR SPECIMN AEROBIC: CPT | Performed by: PODIATRIST

## 2024-07-05 PROCEDURE — 99499 UNLISTED E&M SERVICE: CPT | Mod: ,,, | Performed by: PODIATRIST

## 2024-07-05 PROCEDURE — 11044 DBRDMT BONE 1ST 20 SQ CM/<: CPT

## 2024-07-05 PROCEDURE — 87075 CULTR BACTERIA EXCEPT BLOOD: CPT | Performed by: PODIATRIST

## 2024-07-05 PROCEDURE — 87186 SC STD MICRODIL/AGAR DIL: CPT | Performed by: PODIATRIST

## 2024-07-05 PROCEDURE — 87205 SMEAR GRAM STAIN: CPT | Performed by: PODIATRIST

## 2024-07-05 PROCEDURE — 11044 DBRDMT BONE 1ST 20 SQ CM/<: CPT | Mod: ,,, | Performed by: PODIATRIST

## 2024-07-05 NOTE — PROGRESS NOTES
[facility]                Progress Note    Subjective:       Patient ID: Indio Ryder Jr. is a 53 y.o. male.    Chief Complaint: Dressing Change, Wound Care, and Wound Check    Follow up wound care visit. Patient ambulated using knee scooter with prescribed CAM boot on LLE and diabetic tennis shoe w/lift on right foot to exam room without issue. Patient denies fever, nausea/vomiting and flu-like symptoms at present: denies pain/discomfort. Patient reports not having any issues with LLE dressing since nurse visit. Dressing appears intact with no strikethrough drainage noted at present. Patient states he visited the ER to drain abscess that was noted at prior visit and is applying antibiotic ointment daily.     Dressing Change    Wound Check    Review of Systems      Objective:        Physical Exam    Vitals:    07/05/24 1238   BP: (!) 147/85   Pulse: 96   Resp: 18   Temp: 97.4 °F (36.3 °C)       Assessment:           ICD-10-CM ICD-9-CM   1. Diabetic ulcer of left midfoot associated with type 2 diabetes mellitus, with bone involvement without evidence of necrosis  E11.621 250.80    L97.426 707.14   2. Acute on chronic osteomyelitis  M86.10 730.00    M86.60 730.10   3. Type II diabetes mellitus with neurological manifestations  E11.49 250.60            (Retired) Wound 04/08/22 1137 Diabetic Ulcer Left plantar;lateral Foot (Active)   04/08/22 1137    Pre-existing: Yes   Primary Wound Type: Diabetic ulc   Side: Left   Orientation: plantar;lateral   Location: Foot   Wound Number:    Ankle-Brachial Index:    Pulses:    Removal Indication and Assessment:    Wound Outcome:    (Retired) Wound Type:    (Retired) Wound Length (cm):    (Retired) Wound Width (cm):    (Retired) Depth (cm):    Wound Description (Comments):    Removal Indications:    Wound Image   07/05/24 1240   Wound WDL ex 07/05/24 1240   Dressing Appearance Intact;Moist drainage 07/05/24 1240   Drainage Amount  Large 07/05/24 1240   Drainage Characteristics/Odor Serosanguineous;No odor 07/05/24 1240   Appearance Pink;Red;Moist;Bleeding;Tendon 07/05/24 1240   Tissue loss description Full thickness 07/05/24 1240   Red (%), Wound Tissue Color 90 % 07/05/24 1240   Yellow (%), Wound Tissue Color 10 % 07/05/24 1240   Periwound Area Moist;Macerated 07/05/24 1240   Wound Edges Open;Defined 07/05/24 1240   Wound Length (cm) 5.7 cm 07/05/24 1240   Wound Width (cm) 3.2 cm 07/05/24 1240   Wound Depth (cm) 0.5 cm 07/05/24 1240   Wound Volume (cm^3) 9.12 cm^3 07/05/24 1240   Wound Surface Area (cm^2) 18.24 cm^2 07/05/24 1240   Care Cleansed with:;Antimicrobial agent;Soap and water;Sterile normal saline;Debrided 07/05/24 1240   Dressing Changed 07/05/24 1240   Periwound Care Other (see comments) 07/05/24 1240   Compression Tubular elasticized bandage 07/05/24 1240   Off Loading Football dressing;Off loading shoe 07/05/24 1240   Dressing Change Due 07/09/24 07/05/24 1240           Plan:              Tissue pathology and/or culture taken     [x] Yes      [] No  Sharp debridement performed                   [x] Yes       [] No  Labs ordered     [] Yes       [x] No  Imaging ordered    [] Yes      [x] No    Orders Placed This Encounter   Procedures    Gram stain     Standing Status:   Standing     Number of Occurrences:   1    Culture, Anaerobe     Standing Status:   Standing     Number of Occurrences:   1    Aerobic culture     Standing Status:   Standing     Number of Occurrences:   1    Change dressing     Wound Dressing Orders       Dressing change frequency twice a week (prn nurse visit)   Remove old dressing   Cleanse or irrigate with: Hibiclens and Normal Saline     Moisturize dry skin on leg with Enlivaderm     Protect periwound with:  Gentian Violet hugging wound bed, Betadine to plantar foot   Primary dressing: WOUND BASE: Promogran to wound bed, 2-0 Prolene sutures and betadine soaked gauze x 4 (applied by DPM)  Secondary  "dressing:  Mextra 5" x 9" laid vertically.  Meplilex foam non-border x2.   Compression/Off-load: Football (cast padding x 1 1/2 rolls, 1/2 roll fan folded to plantar for cushion) - open toe. Tubigrip, size D, double layer (calf size 34 cm) CAM Boot on the affected foot      Change at Nurse Visit.   - If dressing is saturated to plantar aspect, apply UrgoK2 (40 mmHg) with open toe football        Follow up in about 4 days (around 7/9/2024) for Nurse Visit.         " "    Wound Details:    Location:  Left foot    Location:  Left Midfoot    Type of Debridement:  Excisional       Length (cm):  5.7       Area (sq cm):  18.24       Width (cm):  3.2       Percent Debrided (%):  100       Depth (cm):  5       Total Area Debrided (sq cm):  18.24    Depth of debridement:  Bone    Tissue debrided:  Dermis, Epidermis, Bone, Subcutaneous and Tendon    Devitalized tissue debrided:  Biofilm, Callus and Fibrin    Instruments:  Curette and Rongeur  Bleeding:  Moderate  Hemostasis Achieved: Yes  Method Used:  Pressure (3-0 prolene retention sutures)  Patient tolerance:  Patient tolerated the procedure well with no immediate complications     debrided bone sent for culture      Tissue pathology and/or culture taken     [] Yes      [x] No  Sharp debridement performed                   [x] Yes       [] No  Labs ordered     [] Yes       [x] No  Imaging ordered    [] Yes      [x] No    Orders Placed This Encounter   Procedures    Debridement     This order was created via procedure documentation     Standing Status:   Standing     Number of Occurrences:   1    Gram stain     Standing Status:   Standing     Number of Occurrences:   1    Culture, Anaerobe     Standing Status:   Standing     Number of Occurrences:   1    Aerobic culture     Standing Status:   Standing     Number of Occurrences:   1    Change dressing     Wound Dressing Orders       Dressing change frequency twice a week (prn nurse visit)   Remove old dressing   Cleanse or irrigate with: Hibiclens and Normal Saline     Moisturize dry skin on leg with Enlivaderm     Protect periwound with:  Gentian Violet hugging wound bed, Betadine to plantar foot   Primary dressing: WOUND BASE: Promogran to wound bed, 2-0 Prolene sutures and betadine soaked gauze x 4 (applied by DPM)  Secondary dressing:  Mextra 5" x 9" laid vertically.  Meplilex foam non-border x2.   Compression/Off-load: Football (cast padding x 1 1/2 rolls, 1/2 roll fan folded to " plantar for cushion) - open toe. Tubigrip, size D, double layer (calf size 34 cm) CAM Boot on the affected foot      Change at Nurse Visit.   - If dressing is saturated to plantar aspect, apply UrgoK2 (40 mmHg) with open toe football        Follow up in about 4 days (around 7/9/2024) for Nurse Visit.

## 2024-07-06 LAB
BACTERIA SPEC AEROBE CULT: NORMAL
GRAM STN SPEC: NORMAL
GRAM STN SPEC: NORMAL

## 2024-07-08 LAB — BACTERIA SPEC AEROBE CULT: NORMAL

## 2024-07-09 ENCOUNTER — PATIENT MESSAGE (OUTPATIENT)
Dept: INTERNAL MEDICINE | Facility: CLINIC | Age: 54
End: 2024-07-09
Payer: MEDICARE

## 2024-07-09 ENCOUNTER — HOSPITAL ENCOUNTER (OUTPATIENT)
Dept: WOUND CARE | Facility: HOSPITAL | Age: 54
Discharge: HOME | End: 2024-07-09
Attending: PODIATRIST
Payer: MEDICARE

## 2024-07-09 VITALS — HEART RATE: 102 BPM | TEMPERATURE: 98 F | SYSTOLIC BLOOD PRESSURE: 136 MMHG | DIASTOLIC BLOOD PRESSURE: 52 MMHG

## 2024-07-09 DIAGNOSIS — L02.419 ABSCESS OF SHOULDER: Primary | ICD-10-CM

## 2024-07-09 DIAGNOSIS — M86.10 ACUTE ON CHRONIC OSTEOMYELITIS: ICD-10-CM

## 2024-07-09 DIAGNOSIS — E11.621 DIABETIC ULCER OF LEFT MIDFOOT ASSOCIATED WITH TYPE 2 DIABETES MELLITUS, WITH BONE INVOLVEMENT WITHOUT EVIDENCE OF NECROSIS: Primary | ICD-10-CM

## 2024-07-09 DIAGNOSIS — L97.426 DIABETIC ULCER OF LEFT MIDFOOT ASSOCIATED WITH TYPE 2 DIABETES MELLITUS, WITH BONE INVOLVEMENT WITHOUT EVIDENCE OF NECROSIS: Primary | ICD-10-CM

## 2024-07-09 DIAGNOSIS — M86.60 ACUTE ON CHRONIC OSTEOMYELITIS: ICD-10-CM

## 2024-07-09 DIAGNOSIS — L02.414 ABSCESS OF LEFT ARM: ICD-10-CM

## 2024-07-09 LAB — BACTERIA SPEC ANAEROBE CULT: NORMAL

## 2024-07-09 PROCEDURE — 99213 OFFICE O/P EST LOW 20 MIN: CPT

## 2024-07-09 NOTE — PROGRESS NOTES
[facility]  [address]  [address]  Nurse Visit    Subjective:       Patient seen in clinic today. Patient ambulated with assistance of knee scooter for LLE to [facility]. Prescribed CAM Boot on left foot. Patient denies fever, chills, nausea, vomiting or diarrhea at present. Patient denies pain or discomfort to wound bed but c/o pain to Left Deltoid rating as 2/10 at present. Dressing appears intact without strikethrough drainage. Dressings removed & wounds cleaned by nurse. Left Deltoid wound is still yellow in appearance with a small area of pink and hemosiderin staining to periwound, area is still hard and warm to touch. He reports finishing oral ABX, but has not had a follow up with his PCP. Advised to schedule ASAP for follow up per MD recommendation. Left Plantar Foot wound still has sutures intact and less drainage than previous. Patient declined AVS, has MyChart.  Dressing changed as ordered.      Assessment:          (Retired) Wound 04/08/22 1137 Diabetic Ulcer Left plantar;lateral Foot (Active)   04/08/22 1137    Pre-existing: Yes   Primary Wound Type: Diabetic [facility]   Side: Left   Orientation: plantar;lateral   Location: Foot   Wound Number:    Ankle-Brachial Index:    Pulses:    Removal Indication and Assessment:    Wound Outcome:    (Retired) Wound Type:    (Retired) Wound Length (cm):    (Retired) Wound Width (cm):    (Retired) Depth (cm):    Wound Description (Comments):    Removal Indications:    Wound WDL ex 07/09/24 1610   Dressing Appearance Intact;Moist drainage 07/09/24 1610   Drainage Amount Moderate 07/09/24 1610   Drainage Characteristics/Odor Serosanguineous 07/09/24 1610   Appearance Sutures intact;Pink;Tendon 07/09/24 1610   Tissue loss description Full thickness 07/09/24 1610   Periwound Area Pale white 07/09/24 1610   Wound Edges Defined;Open 07/09/24 1610   Care Cleansed with:;Sterile normal saline;Applied:;Povidone iodine 07/09/24 1610   Dressing  Changed 07/09/24 1610   Periwound Care Absorptive dressing applied 07/09/24 1610   Compression Tubular elasticized bandage 07/09/24 1610   Off Loading Football dressing;Off loading shoe 07/09/24 1610   Dressing Change Due 07/12/24 07/09/24 1610            Wound 06/25/24 Abscess Left upper Arm (Active)   06/25/24    Present on Original Admission: Y   Primary Wound Type: Abscess   Side: Left   Orientation: upper   Location: Arm   Wound Approximate Age at First Assessment (Weeks):    Wound Number:    Is this injury device related?:    Incision Type:    Closure Method:    Wound Description (Comments):    Type:    Additional Comments:    Ankle-Brachial Index:    Pulses:    Removal Indication and Assessment:    Wound Outcome:    Dressing Appearance Intact;Moist drainage 07/09/24 1641   Drainage Amount Small 07/09/24 1641   Drainage Characteristics/Odor Serosanguineous 07/09/24 1641   Appearance Pink;Yellow;Moist 07/09/24 1641   Tissue loss description Full thickness 07/09/24 1641   Black (%), Wound Tissue Color 0 % 07/09/24 1641   Red (%), Wound Tissue Color 20 % 07/09/24 1641   Yellow (%), Wound Tissue Color 80 % 07/09/24 1641   Periwound Area Hemosiderin Staining 07/09/24 1641   Wound Edges Open 07/09/24 1641   Wound Length (cm) 1.5 cm 07/09/24 1641   Wound Width (cm) 0.6 cm 07/09/24 1641   Wound Depth (cm) 0.1 cm 07/09/24 1641   Wound Volume (cm^3) 0.09 cm^3 07/09/24 1641   Wound Surface Area (cm^2) 0.9 cm^2 07/09/24 1641   Tunneling (depth (cm)/location) 0 07/09/24 1641   Undermining (depth (cm)/location) 0 07/09/24 1641   Care Cleansed with:;Antimicrobial agent 07/09/24 1641   Dressing Changed 07/09/24 1641   Periwound Care Skin barrier film applied;Absorptive dressing applied 07/09/24 1641   Dressing Change Due 07/12/24 07/09/24 1641           Plan:     Wound Dressing Orders      Dressing change frequency twice a week (prn nurse visit)  Remove old dressing  Cleanse or irrigate with: Hibiclens and Normal  "Saline    Moisturize dry skin on leg with Enlivaderm    Protect periwound with:  Gentian Violet hugging wound bed, Betadine to plantar foot  Primary dressing: WOUND BASE: Promogran to wound bed, then betadine soaked gauze x 4   Secondary dressing:  Mextra 5" x 9" laid vertically.  Optifoam Ag non-border x2.  Compression/Off-load: Football (cast padding x 1 1/2 rolls, 1/2 roll fan folded to plantar for cushion) - open toe. Tubigrip, size D, double layer (calf size 34 cm) CAM Boot on the affected foot    Left Deltoid  Protect periwound with: Cavilon  Primary dressing: Iodoflex to wound bed  Secondary dressing: Mepilex border dressing             Follow up in about 3 days (around 7/12/2024) for Wound Care.          "

## 2024-07-10 NOTE — TELEPHONE ENCOUNTER
Pt reports that boil is not healing, has wound care and goes to ER for lancing over 3 weeks    Would like to know your recommendations

## 2024-07-12 ENCOUNTER — OFFICE VISIT (OUTPATIENT)
Dept: SURGERY | Facility: CLINIC | Age: 54
End: 2024-07-12
Payer: MEDICARE

## 2024-07-12 ENCOUNTER — HOSPITAL ENCOUNTER (OUTPATIENT)
Dept: WOUND CARE | Facility: HOSPITAL | Age: 54
Discharge: HOME | End: 2024-07-12
Attending: PODIATRIST
Payer: MEDICARE

## 2024-07-12 VITALS
WEIGHT: 204.94 LBS | DIASTOLIC BLOOD PRESSURE: 74 MMHG | SYSTOLIC BLOOD PRESSURE: 109 MMHG | BODY MASS INDEX: 27.16 KG/M2 | HEIGHT: 73 IN | HEART RATE: 103 BPM

## 2024-07-12 VITALS — TEMPERATURE: 98 F | DIASTOLIC BLOOD PRESSURE: 89 MMHG | HEART RATE: 100 BPM | SYSTOLIC BLOOD PRESSURE: 156 MMHG

## 2024-07-12 DIAGNOSIS — E11.621 DIABETIC ULCER OF LEFT MIDFOOT ASSOCIATED WITH TYPE 2 DIABETES MELLITUS, WITH BONE INVOLVEMENT WITHOUT EVIDENCE OF NECROSIS: Primary | ICD-10-CM

## 2024-07-12 DIAGNOSIS — E11.49 TYPE II DIABETES MELLITUS WITH NEUROLOGICAL MANIFESTATIONS: ICD-10-CM

## 2024-07-12 DIAGNOSIS — M86.60 ACUTE ON CHRONIC OSTEOMYELITIS: ICD-10-CM

## 2024-07-12 DIAGNOSIS — L97.426 DIABETIC ULCER OF LEFT MIDFOOT ASSOCIATED WITH TYPE 2 DIABETES MELLITUS, WITH BONE INVOLVEMENT WITHOUT EVIDENCE OF NECROSIS: Primary | ICD-10-CM

## 2024-07-12 DIAGNOSIS — M86.10 ACUTE ON CHRONIC OSTEOMYELITIS: ICD-10-CM

## 2024-07-12 DIAGNOSIS — L02.419 ABSCESS OF SHOULDER: ICD-10-CM

## 2024-07-12 PROCEDURE — 99213 OFFICE O/P EST LOW 20 MIN: CPT | Performed by: PODIATRIST

## 2024-07-12 PROCEDURE — 99214 OFFICE O/P EST MOD 30 MIN: CPT | Mod: ,,, | Performed by: PODIATRIST

## 2024-07-12 PROCEDURE — 99213 OFFICE O/P EST LOW 20 MIN: CPT | Mod: PBBFAC,27,PN | Performed by: SURGERY

## 2024-07-12 PROCEDURE — 99999 PR PBB SHADOW E&M-EST. PATIENT-LVL III: CPT | Mod: PBBFAC,,, | Performed by: SURGERY

## 2024-07-12 NOTE — PROGRESS NOTES
[facility]                Progress Note    Subjective:       Patient ID: Indio Ryder Jr. is a 54 y.o. male.    Chief Complaint: Dressing Change, Diabetic Foot Ulcer, and Wound Care    Follow Up wound care visit. Patient ambulated with assistance of knee scooter for LLE to [facility]. Prescribed CAM Boot on left Foot. Patient denies fever, chills, nausea, vomiting or diarrhea at present. Patient denies pain or discomfort to wound bed at present.. Patient reports not having any issues with dressing Tubigrip since last visit. Dressing appears intact without strikethrough drainage. Dressing removed by MA and left on side tray for nurse to assess, then wounds were cleaned MA. Left Plantar Foot wound still has sutures intact, wound bed appears pink with tendon still visible.     No change to dressing order; applied per DPM orders. Patient will return to [facility] in 1 week to see DPM and on Tuesday for Nurse Visit. Patient declined AVS, has MyChart.       Review of Systems      Objective:        Physical Exam    Vitals:    07/12/24 0850   BP: (!) 156/89   Pulse: 100   Temp: 97.8 °F (36.6 °C)       Assessment:           ICD-10-CM ICD-9-CM   1. Diabetic ulcer of left midfoot associated with type 2 diabetes mellitus, with bone involvement without evidence of necrosis  E11.621 250.80    L97.426 707.14   2. Acute on chronic osteomyelitis  M86.10 730.00    M86.60 730.10   3. Type II diabetes mellitus with neurological manifestations  E11.49 250.60            (Retired) Wound 04/08/22 1137 Diabetic Ulcer Left plantar;lateral Foot (Active)   04/08/22 1137    Pre-existing: Yes   Primary Wound Type: Diabetic ulc   Side: Left   Orientation: plantar;lateral   Location: Foot   Wound Number:    Ankle-Brachial Index:    Pulses:    Removal Indication and Assessment:    Wound Outcome:    (Retired) Wound Type:    (Retired) Wound Length (cm):    (Retired) Wound Width (cm):    (Retired) Depth (cm):     Wound Description (Comments):    Removal Indications:    Wound Image   07/12/24 0900   Wound WDL ex 07/12/24 0900   Dressing Appearance Intact;Moist drainage 07/12/24 0900   Drainage Amount Moderate 07/12/24 0900   Drainage Characteristics/Odor Serosanguineous;No odor 07/12/24 0900   Appearance Pink;Moist;Tendon;Sutures intact 07/12/24 0900   Tissue loss description Full thickness 07/12/24 0900   Black (%), Wound Tissue Color 0 % 07/12/24 0900   Red (%), Wound Tissue Color 90 % 07/12/24 0900   Yellow (%), Wound Tissue Color 10 % 07/12/24 0900   Periwound Area Pale white 07/12/24 0900   Wound Edges Defined;Open 07/12/24 0900   Wound Length (cm) 5.4 cm 07/12/24 0900   Wound Width (cm) 1.8 cm 07/12/24 0900   Wound Depth (cm) 0.4 cm 07/12/24 0900   Wound Volume (cm^3) 3.888 cm^3 07/12/24 0900   Wound Surface Area (cm^2) 9.72 cm^2 07/12/24 0900   Tunneling (depth (cm)/location) 0 07/12/24 0900   Undermining (depth (cm)/location) 0 07/12/24 0900   Care Cleansed with:;Antimicrobial agent;Sterile normal saline 07/12/24 0900   Dressing Changed 07/12/24 0900   Periwound Care Moisture barrier applied;Absorptive dressing applied 07/12/24 0900   Compression Tubular elasticized bandage 07/12/24 0900   Off Loading Football dressing;Off loading shoe 07/12/24 0900   Dressing Change Due 07/16/24 07/12/24 0900           Plan:              Tissue pathology and/or culture taken     [] Yes      [x] No  Sharp debridement performed                   [] Yes       [x] No  Labs ordered     [] Yes       [x] No  Imaging ordered    [] Yes      [x] No    Orders Placed This Encounter   Procedures    Change dressing     Wound Dressing Orders      Dressing change frequency twice a week (prn nurse visit)  Remove old dressing  Cleanse or irrigate with: Hibiclens and Normal Saline    Moisturize dry skin on leg with Enlivaderm    Protect periwound with:  Gentian Violet hugging wound bed, Betadine to plantar foot  Primary dressing: WOUND BASE:  "Promogran to wound bed then betadine soaked gauze x 4   Secondary dressing:  Mextra 5" x 9" laid vertically.  Abram foam long (laid vertically)  Compression/Off-load: Football (cast padding x 1 1/2 rolls, 1/2 roll fan folded to plantar for cushion) - open toe. Tubigrip, size D, double layer (calf size 34 cm) CAM Boot on the affected foot     Change at Nurse Visit.  - If dressing is saturated to plantar aspect, apply UrgoK2 (40 mmHg) with open toe football        Follow up in about 4 days (around 7/16/2024) for Nurse Visit.         " "taken     [] Yes      [x] No  Sharp debridement performed                   [] Yes       [x] No  Labs ordered     [] Yes       [x] No  Imaging ordered    [] Yes      [x] No    Orders Placed This Encounter   Procedures    Change dressing     Wound Dressing Orders      Dressing change frequency twice a week (prn nurse visit)  Remove old dressing  Cleanse or irrigate with: Hibiclens and Normal Saline    Moisturize dry skin on leg with Enlivaderm    Protect periwound with:  Gentian Violet hugging wound bed, Betadine to plantar foot  Primary dressing: WOUND BASE: Promogran to wound bed then betadine soaked gauze x 4   Secondary dressing:  Mextra 5" x 9" laid vertically.  Abram foam long (laid vertically)  Compression/Off-load: Football (cast padding x 1 1/2 rolls, 1/2 roll fan folded to plantar for cushion) - open toe. Tubigrip, size D, double layer (calf size 34 cm) CAM Boot on the affected foot     Change at Nurse Visit.  - If dressing is saturated to plantar aspect, apply UrgoK2 (40 mmHg) with open toe football        Follow up in about 4 days (around 7/16/2024) for Nurse Visit.       "

## 2024-07-12 NOTE — PROGRESS NOTES
[facility]  OUTPATIENT H&P        HPI: Indio Ryder Jr. is a 54 y.o. male with infected cyst left shoulder for several weeks.  Pt had prior ID with limited results.    I have reviewed the patient's chart including prior progress notes, procedures and testing.     ROS:   Review of Systems   All other systems reviewed and are negative.      PROBLEM LIST:  Patient Active Problem List   Diagnosis    Essential hypertension    Mixed hyperlipidemia    Type 2 diabetes mellitus with hyperglycemia, with long-term current use of insulin    Actinomyces infection    Hypokalemia    Neck pain    Diabetic ulcer of right midfoot associated with type 2 diabetes mellitus, with necrosis of bone    Diabetic ulcer of left midfoot associated with type 2 diabetes mellitus, with bone involvement without evidence of necrosis    Hypertensive retinopathy, bilateral    Subacute osteomyelitis of left foot    Allergic reaction due to antibacterial drug    Chronic left shoulder pain    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 edema, with long-term current use of insulin    Diabetic ulcer of left foot    Septic arthritis of left foot    Acute on chronic osteomyelitis    History of colon cancer    Diabetic ulcer of right foot associated with type 2 diabetes mellitus, with fat layer exposed    Chronic osteomyelitis of right foot    Pre-ulcerative calluses    Eversion deformity of foot, left    Hx of amputation of lesser toe, left    Type II diabetes mellitus with neurological manifestations    Chronic osteomyelitis of left foot    Equinus deformity of both feet    H/O amputation of lesser toe, right    Right great toe amputee    Blister (nonthermal), left foot, initial encounter    Blister (nonthermal), left foot, sequela    Abscess of left arm         HISTORY  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    Right foot    Colon  adenocarcinoma 04/12/2022    Diabetic ketoacidosis without coma associated with type 2 diabetes mellitus 05/30/2017    Diabetic ulcer of right foot associated with type 2 diabetes mellitus 06/03/2015    Disorder of kidney and ureter     Essential hypertension 06/06/2013    Group B streptococcal infection 01/13/2017    Mixed hyperlipidemia 08/12/2014    Septic arthritis of left foot 04/28/2021    Shoulder impingement 08/12/2014    Subacute osteomyelitis of right foot 01/12/2017    Streptococcus agalactiae, Actinomyces odontolyticus    Traumatic amputation of fifth toe of right foot 07/02/2015    Type 2 diabetes mellitus with diabetic neuropathy, with long-term current use of insulin 05/03/2016       Past Surgical History:   Procedure Laterality Date    COLONOSCOPY Right 1/19/2022    Procedure: COLONOSCOPY;  Surgeon: Tj Haile MD;  Location: [facility] (4TH FLR);  Service: Endoscopy;  Laterality: Right;  previous order un-usable/poor prep 1/18/22  RAPID COVID test arrival 12:20  instructions handed to pt-     COLONOSCOPY N/A 3/21/2022    Procedure: COLONOSCOPY;  Surgeon: Sheng Haque MD;  Location: [facility] (2ND FLR);  Service: Endoscopy;  Laterality: N/A;  Colonoscopy with AES for hepatix flexure polyp removal, 2nd floor  Pt is fully vaccinated-DS  Pt prescribed Plavix by Dr. Stahl in Jan. 2022, however pt states that he never started taking it-DS  3/16/22-Instructions sent via portal-DS    COLONOSCOPY N/A 9/13/2023    Procedure: COLONOSCOPY;  Surgeon: Erik Stout MD;  Location: [facility] (4TH FLR);  Service: Colon and Rectal;  Laterality: N/A;  Referred by timur Saunders, [facility], portal -ml    DEBRIDEMENT OF FOOT Left 7/14/2019    Procedure: DEBRIDEMENT, FOOT, with left 5th ray amputation;  Surgeon: Sis Hickman DPM;  Location: [facility] OR 2ND FLR;  Service: Podiatry;  Laterality: Left;    DEBRIDEMENT OF FOOT Left 7/17/2019    Procedure: DEBRIDEMENT, FOOT with leftt 5th ray partial amputation  with Neox Graft;  Surgeon: Mai Burrell DPM;  Location: [facility] OR 2ND FLR;  Service: Podiatry;  Laterality: Left;  t    DEBRIDEMENT OF FOOT Bilateral 4/29/2021    Procedure: DEBRIDEMENT, FOOT;  Surgeon: Mai Burrell DPM;  Location: [facility] OR 1ST FLR;  Service: Podiatry;  Laterality: Bilateral;  Graft application    DEBRIDEMENT OF FOOT Left 7/31/2023    Procedure: DEBRIDEMENT, FOOT;  Surgeon: Marivel Peterson DPM;  Location: [facility] OR 2ND FLR;  Service: Podiatry;  Laterality: Left;    TOE AMPUTATION Right 06/05/2015    TOE AMPUTATION Right 01/19/2017    XI ROBOTIC COLECTOMY, RIGHT N/A 4/25/2022    Procedure: XI ROBOTIC COLECTOMY, RIGHT (lithotomy, eras low);  Surgeon: Erik Stout MD;  Location: [facility] OR 2ND FLR;  Service: Colon and Rectal;  Laterality: N/A;  Paruch to Goodwin    XI ROBOTIC COLECTOMY, RIGHT  4/25/2022    Procedure: ;  Surgeon: Erik Stout MD;  Location: [facility] OR 2ND FLR;  Service: Colon and Rectal;;       Social History     Tobacco Use    Smoking status: Never    Smokeless tobacco: Never   Substance Use Topics    Alcohol use: No    Drug use: No       Family History   Adopted: Yes   Problem Relation Name Age of Onset    Hypertension Mother      Cancer Mother          breast    Diabetes Mother      Hypertension Father      Cataracts Father      Heart disease Father          mi    Diabetes Sister Michelle     No Known Problems Brother      Heart disease Sister Manuela         MI    No Known Problems Sister      Hypertension Maternal Aunt      No Known Problems Maternal Uncle      No Known Problems Paternal Aunt      No Known Problems Paternal Uncle      No Known Problems Maternal Grandmother      No Known Problems Maternal Grandfather      No Known Problems Paternal Grandmother      No Known Problems Paternal Grandfather      Amblyopia Neg Hx      Blindness Neg Hx      Glaucoma Neg Hx      Macular degeneration Neg Hx      Retinal detachment Neg Hx      Strabismus Neg Hx       "Stroke Neg Hx      Thyroid disease Neg Hx           MEDS:  Current Outpatient Medications on File Prior to Visit   Medication Sig Dispense Refill    amoxicillin-clavulanate 875-125mg (AUGMENTIN) 875-125 mg per tablet Take 1 tablet by mouth 2 (two) times a day. 60 tablet 5    atorvastatin (LIPITOR) 80 MG tablet Take 1 tablet (80 mg total) by mouth once daily. 90 tablet 3    empagliflozin (JARDIANCE) 25 mg tablet Take 1 tablet (25 mg total) by mouth once daily. 30 tablet 8    insulin glargine U-300 conc (TOUJEO MAX U-300 SOLOSTAR) 300 unit/mL (3 mL) insulin pen Inject 26 to 32 units under the skin at night 2 Pen 6    ONETOUCH DELICA LANCETS 33 gauge Misc       ONETOUCH ULTRA2 kit       pen needle, diabetic (BD SASCHA 2ND GEN PEN NEEDLE) 32 gauge x 5/32" Ndle Use 4 times a day 400 each 3    insulin aspart U-100 (NOVOLOG) 100 unit/mL (3 mL) InPn pen Inject if sugar is > 180 up to 4x a day , 180-230+2, 231-280+4, 281-330+6, 331-380+8, >380+10. Max daily 40 units (Patient not taking: Reported on 7/12/2024) 15 mL 2    tobramycin (NEBCIN) 1.2 gram injection Apply 1 g topically daily as needed (apply as per DPM order). Apply to wound bed (Patient not taking: Reported on 7/9/2024)       No current facility-administered medications on file prior to visit.       ALLERGIES:  Review of patient's allergies indicates:  No Known Allergies      VITALS:  Vitals:    07/12/24 1311   BP: 109/74   Pulse: 103         PHYSICAL EXAM:  Physical Exam  Constitutional:       Appearance: Normal appearance.   Skin:     Comments: Large abscess left shoulder           LABS:  Lab Results   Component Value Date    WBC 5.38 07/02/2024    RBC 4.47 (L) 07/02/2024    HGB 12.5 (L) 07/02/2024    HCT 40.2 07/02/2024    HCT 44 05/30/2017     07/02/2024     Lab Results   Component Value Date     (H) 07/02/2024     07/02/2024    K 4.0 07/02/2024     07/02/2024    CO2 25 07/02/2024    BUN 12 07/02/2024    CREATININE 1.4 07/02/2024    " CALCIUM 10.0 07/02/2024     Lab Results   Component Value Date    ALT 10 07/02/2024    AST 9 (L) 07/02/2024    ALKPHOS 188 (H) 07/02/2024    BILITOT 0.5 07/02/2024     Lab Results   Component Value Date    MG 1.9 04/26/2022    PHOS 3.5 04/26/2022             ASSESSMENT & PLAN:  54 y.o. male needs I and D.  Risks, benefits, and alternatives to the procedure were explained to the patient in detail.  All questions were answered and the patient has requested the procedure be done.  Informed consent was obtained.        Pancho Cortes M.D., F.A.C.S.  [id number] and General Surgery  [facility]

## 2024-07-12 NOTE — PROGRESS NOTES
PATIENT: Indio Ryder Jr.    MRN: [id number]    DATE OF PROCEDURE: 07/12/2024    PREOPERATIVE DIAGNOSIS: Abscess of left shoulder    POSTOPERATIVE DIAGNOSIS: same    PROCEDURE: Complex Incision and Drainage of Left Shoulder Abscess    SURGEON: Pancho Cortes M.D.    ANESTHESIA: Local    ESTIMATED BLOOD LOSS: minimal    SPECIMEN: none    COMPLICATIONS: None    PROCEDURE IN DETAIL:  After procedural consent was obtained, the area was prepped and draped in a standard sterile fashion.  Local anesthetic was administered.  An incision was made through the skin and into the abscess cavity.  Copious purulent fluid was expressed and the cavity was irrigated out with sterile saline.  The abscess cavity was packed after being bluntly probed to release loculations.  A sterile dressing was applied.  The patient tolerated the procedure without difficulty or complication.      Pancho Cortes M.D., F.A.C.S.  [id number] and General Surgery  [facility]

## 2024-07-16 ENCOUNTER — HOSPITAL ENCOUNTER (OUTPATIENT)
Dept: WOUND CARE | Facility: HOSPITAL | Age: 54
Discharge: HOME | End: 2024-07-16
Attending: PODIATRIST
Payer: MEDICARE

## 2024-07-16 VITALS
HEART RATE: 94 BPM | DIASTOLIC BLOOD PRESSURE: 72 MMHG | SYSTOLIC BLOOD PRESSURE: 126 MMHG | RESPIRATION RATE: 18 BRPM | TEMPERATURE: 97 F

## 2024-07-16 DIAGNOSIS — L97.426 DIABETIC ULCER OF LEFT MIDFOOT ASSOCIATED WITH TYPE 2 DIABETES MELLITUS, WITH BONE INVOLVEMENT WITHOUT EVIDENCE OF NECROSIS: Primary | ICD-10-CM

## 2024-07-16 DIAGNOSIS — M86.60 ACUTE ON CHRONIC OSTEOMYELITIS: ICD-10-CM

## 2024-07-16 DIAGNOSIS — E11.621 DIABETIC ULCER OF LEFT MIDFOOT ASSOCIATED WITH TYPE 2 DIABETES MELLITUS, WITH BONE INVOLVEMENT WITHOUT EVIDENCE OF NECROSIS: Primary | ICD-10-CM

## 2024-07-16 DIAGNOSIS — M86.10 ACUTE ON CHRONIC OSTEOMYELITIS: ICD-10-CM

## 2024-07-16 PROCEDURE — 29581 APPL MULTLAYER CMPRN SYS LEG: CPT

## 2024-07-16 NOTE — PROGRESS NOTES
"[facility]  [address]   [place]  [address]  Nurse Visit    Subjective:       Patient seen in clinic today. Patient ambulated with assistance of knee scooter for LLE to [facility]. Prescribed CAM Boot on left Foot. Patient denies fever, chills, nausea, vomiting or diarrhea at present. Patient denies pain or discomfort to wound beds at present. He reports having Left Deltoid "drained" on Friday by General Surgeon, but unsure of follow up. Consulted with on-duty MD, will pack with Hydrofera Blue rope and change on Friday. Dressing to LLE appears intact without strikethrough drainage. Dressings removed & wound cleaned by nurse.  Left Plantar Foot wound still has sutures intact, LLE has +1 non-pitting edema noted in calf and ankle area. Will apply Coflex TLC per DPM order. Patient declined AVS, has MyChart.  Dressing changed as ordered.      Assessment:          (Retired) Wound 04/08/22 1137 Diabetic Ulcer Left plantar;lateral Foot (Active)   04/08/22 1137    Pre-existing: Yes   Primary Wound Type: Diabetic ulc   Side: Left   Orientation: plantar;lateral   Location: Foot   Wound Number:    Ankle-Brachial Index:    Pulses:    Removal Indication and Assessment:    Wound Outcome:    (Retired) Wound Type:    (Retired) Wound Length (cm):    (Retired) Wound Width (cm):    (Retired) Depth (cm):    Wound Description (Comments):    Removal Indications:    Wound WDL ex 07/16/24 1623   Dressing Appearance Intact;Moist drainage 07/16/24 1623   Drainage Amount Moderate 07/16/24 1623   Drainage Characteristics/Odor Serosanguineous;No odor 07/16/24 1623   Appearance Sutures intact;Pink;Tendon;Moist 07/16/24 1623   Tissue loss description Full thickness 07/16/24 1623   Periwound Area Pale white 07/16/24 1623   Wound Edges Open 07/16/24 1623   Care Cleansed with:;Antimicrobial agent;Sterile normal saline;Applied:;Povidone iodine 07/16/24 1623   Dressing Changed 07/16/24 1623   Periwound Care Absorptive dressing " applied 07/16/24 1623   Compression Two layer compression 07/16/24 1623   Off Loading Football dressing;Off loading shoe 07/16/24 1623   Dressing Change Due 07/19/24 07/16/24 1623            Wound 06/25/24 Abscess Left upper Arm (Active)   06/25/24    Present on Original Admission: Y   Primary Wound Type: Abscess   Side: Left   Orientation: upper   Location: Arm   Wound Approximate Age at First Assessment (Weeks):    Wound Number:    Is this injury device related?:    Incision Type:    Closure Method:    Wound Description (Comments):    Type:    Additional Comments:    Ankle-Brachial Index:    Pulses:    Removal Indication and Assessment:    Wound Outcome:    Dressing Appearance Intact;Moist drainage 07/16/24 1623   Drainage Amount Moderate 07/16/24 1623   Drainage Characteristics/Odor Serosanguineous 07/16/24 1623   Appearance Pink;Yellow;Moist 07/16/24 1623   Tissue loss description Full thickness 07/16/24 1623   Black (%), Wound Tissue Color 0 % 07/16/24 1623   Red (%), Wound Tissue Color 50 % 07/16/24 1623   Yellow (%), Wound Tissue Color 50 % 07/16/24 1623   Periwound Area Dry 07/16/24 1623   Wound Edges Defined;Open 07/16/24 1623   Wound Length (cm) 0.4 cm 07/16/24 1623   Wound Width (cm) 1 cm 07/16/24 1623   Wound Depth (cm) 2 cm 07/16/24 1623   Wound Volume (cm^3) 0.8 cm^3 07/16/24 1623   Wound Surface Area (cm^2) 0.4 cm^2 07/16/24 1623   Tunneling (depth (cm)/location) 0 07/16/24 1623   Undermining (depth (cm)/location) 0 07/16/24 1623   Care Cleansed with:;Antimicrobial agent;Sterile normal saline 07/16/24 1623   Dressing Applied;Methylene blue/gentian violet;[place]/border 07/16/24 1623   Periwound Care Skin barrier film applied 07/16/24 1623   Dressing Change Due 07/19/24 07/16/24 1623           Plan:     Wound Dressing Orders      Dressing change frequency twice a week (prn nurse visit)  Remove old dressing  Cleanse or irrigate with: Hibiclens and Normal Saline    Moisturize dry skin on leg with  "Enlivaderm    Protect periwound with:  Gentian Violet hugging wound bed, Betadine to plantar foot  Primary dressing: WOUND BASE: Promogran to wound bed then betadine soaked gauze x 4  Secondary dressing:  Mextra 5" x 9" laid vertically.  Abram foam long x2 (laid vertically)  Compression/Off-load: Football (cast padding x 1 1/2 rolls, 1/2 roll fan folded to plantar for cushion) - open toe. TLC Coflex. CAM Boot on the affected foot     Left Deltoid:   Flush with Normal Saline (20 mL)  Periwound: Cavilon then Iodine hugging wound bed  Primary dressing: Pack with Hydrofera Blue Rope (5 cm length with 1 cm wicking to Mepilex, 1 continuous strip packed into wound bed)  Cover with Mepilex border.             Follow up in about 3 days (around 7/19/2024) for Wound Care.          "

## 2024-07-19 ENCOUNTER — HOSPITAL ENCOUNTER (OUTPATIENT)
Dept: WOUND CARE | Facility: HOSPITAL | Age: 54
Discharge: HOME | End: 2024-07-19
Attending: PODIATRIST
Payer: MEDICARE

## 2024-07-19 ENCOUNTER — TELEPHONE (OUTPATIENT)
Dept: INFECTIOUS DISEASES | Facility: CLINIC | Age: 54
End: 2024-07-19
Payer: MEDICARE

## 2024-07-19 VITALS
RESPIRATION RATE: 18 BRPM | HEART RATE: 90 BPM | DIASTOLIC BLOOD PRESSURE: 98 MMHG | OXYGEN SATURATION: 97 % | TEMPERATURE: 99 F | SYSTOLIC BLOOD PRESSURE: 160 MMHG

## 2024-07-19 DIAGNOSIS — E11.621 DIABETIC ULCER OF LEFT MIDFOOT ASSOCIATED WITH TYPE 2 DIABETES MELLITUS, WITH BONE INVOLVEMENT WITHOUT EVIDENCE OF NECROSIS: Primary | ICD-10-CM

## 2024-07-19 DIAGNOSIS — M86.60 ACUTE ON CHRONIC OSTEOMYELITIS: ICD-10-CM

## 2024-07-19 DIAGNOSIS — M86.10 ACUTE ON CHRONIC OSTEOMYELITIS: ICD-10-CM

## 2024-07-19 DIAGNOSIS — E11.49 TYPE II DIABETES MELLITUS WITH NEUROLOGICAL MANIFESTATIONS: ICD-10-CM

## 2024-07-19 DIAGNOSIS — L97.426 DIABETIC ULCER OF LEFT MIDFOOT ASSOCIATED WITH TYPE 2 DIABETES MELLITUS, WITH BONE INVOLVEMENT WITHOUT EVIDENCE OF NECROSIS: Primary | ICD-10-CM

## 2024-07-19 PROCEDURE — 11044 DBRDMT BONE 1ST 20 SQ CM/<: CPT

## 2024-07-19 PROCEDURE — 11044 DBRDMT BONE 1ST 20 SQ CM/<: CPT | Mod: ,,, | Performed by: PODIATRIST

## 2024-07-19 PROCEDURE — 99499 UNLISTED E&M SERVICE: CPT | Mod: ,,, | Performed by: PODIATRIST

## 2024-07-19 NOTE — PROGRESS NOTES
[facility]                Progress Note    Subjective:       Patient ID: Indio Ryder Jr. is a 54 y.o. male.    Chief Complaint: Dressing Change, Wound Care, and Wound Check    Follow Up wound care visit. Patient ambulated with assistance of knee scooter for LLE to [facility]. Prescribed CAM Boot on left Foot. Patient denies fever, chills, nausea, vomiting or diarrhea at present; denies pain or discomfort to wound bed at present.. Patient reports not having any issues with dressing Tubigrip since last visit. Dressing appears intact without strikethrough drainage.    Dressing Change    Wound Check    Review of Systems      Objective:        Physical Exam    Vitals:    07/19/24 0919   BP: (!) 160/98   Pulse: 90   Resp: 18   Temp: 98.9 °F (37.2 °C)       Assessment:           ICD-10-CM ICD-9-CM   1. Diabetic ulcer of left midfoot associated with type 2 diabetes mellitus, with bone involvement without evidence of necrosis  E11.621 250.80    L97.426 707.14   2. Acute on chronic osteomyelitis  M86.10 730.00    M86.60 730.10   3. Type II diabetes mellitus with neurological manifestations  E11.49 250.60            (Retired) Wound 04/08/22 1137 Diabetic Ulcer Left plantar;lateral Foot (Active)   04/08/22 1137    Pre-existing: Yes   Primary Wound Type: Diabetic ulc   Side: Left   Orientation: plantar;lateral   Location: Foot   Wound Number:    Ankle-Brachial Index:    Pulses:    Removal Indication and Assessment:    Wound Outcome:    (Retired) Wound Type:    (Retired) Wound Length (cm):    (Retired) Wound Width (cm):    (Retired) Depth (cm):    Wound Description (Comments):    Removal Indications:    Wound Image   07/19/24 1108   Wound WDL ex 07/19/24 1108   Dressing Appearance Intact;Moist drainage 07/19/24 1108   Drainage Amount Moderate 07/19/24 1108   Drainage Characteristics/Odor Serosanguineous;No odor 07/19/24 1108   Appearance Pink;Red;Moist;Tendon;Bone 07/19/24 1108    Tissue loss description Full thickness 07/19/24 1108   Black (%), Wound Tissue Color 0 % 07/19/24 1108   Red (%), Wound Tissue Color 100 % 07/19/24 1108   Yellow (%), Wound Tissue Color 0 % 07/19/24 1108   Periwound Area Pale white 07/19/24 1108   Wound Edges Open;Defined 07/19/24 1108   Wound Length (cm) 5.5 cm 07/19/24 1108   Wound Width (cm) 3.4 cm 07/19/24 1108   Wound Depth (cm) 0.5 cm 07/19/24 1108   Wound Volume (cm^3) 9.35 cm^3 07/19/24 1108   Wound Surface Area (cm^2) 18.7 cm^2 07/19/24 1108   Care Cleansed with:;Antimicrobial agent;Soap and water;Sterile normal saline;Wound cleanser 07/19/24 1108   Dressing Changed 07/19/24 1108   Periwound Care Moisture barrier applied 07/19/24 1108   Compression Tubular elasticized bandage 07/19/24 1108   Off Loading Football dressing;Off loading shoe 07/19/24 1108   Dressing Change Due 07/23/24 07/19/24 1108            Wound 06/25/24 Abscess Left upper Arm (Active)   06/25/24    Present on Original Admission: Y   Primary Wound Type: Abscess   Side: Left   Orientation: upper   Location: Arm   Wound Approximate Age at First Assessment (Weeks):    Wound Number:    Is this injury device related?:    Incision Type:    Closure Method:    Wound Description (Comments):    Type:    Additional Comments:    Ankle-Brachial Index:    Pulses:    Removal Indication and Assessment:    Wound Outcome:    Wound Image   07/19/24 1108   Dressing Appearance Intact;Moist drainage 07/19/24 1108   Drainage Amount Moderate 07/19/24 1108   Drainage Characteristics/Odor Creamy;No odor 07/19/24 1108   Appearance Pink;Yellow;Moist 07/19/24 1108   Tissue loss description Full thickness 07/19/24 1108   Black (%), Wound Tissue Color 0 % 07/19/24 1108   Red (%), Wound Tissue Color 95 % 07/19/24 1108   Yellow (%), Wound Tissue Color 5 % 07/19/24 1108   Periwound Area Dry;Intact 07/19/24 1108   Wound Edges Defined;Open 07/19/24 1108   Wound Length (cm) 0.4 cm 07/19/24 1108   Wound Width (cm) 1 cm  "07/19/24 1108   Wound Depth (cm) 2 cm 07/19/24 1108   Wound Volume (cm^3) 0.8 cm^3 07/19/24 1108   Wound Surface Area (cm^2) 0.4 cm^2 07/19/24 1108   Tunneling (depth (cm)/location) 0 07/19/24 1108   Undermining (depth (cm)/location) 2 07/19/24 1108   Care Cleansed with:;Antimicrobial agent;Sterile normal saline;Wound cleanser 07/19/24 1108   Dressing Changed 07/19/24 1108   Packing packed with 07/19/24 1108   Packing Inserted  1 07/19/24 1108   Packing Removed 1 07/19/24 1108   Periwound Care Moisture barrier applied;Absorptive dressing applied 07/19/24 1108   Dressing Change Due 07/23/24 07/19/24 1108           Plan:              Tissue pathology and/or culture taken     [] Yes      [x] No  Sharp debridement performed                   [x] Yes       [] No  Labs ordered     [] Yes       [x] No  Imaging ordered    [] Yes      [x] No    Orders Placed This Encounter   Procedures    Change dressing     Wound Dressing Orders      Dressing change frequency twice a week (prn nurse visit)  Remove old dressing  Cleanse or irrigate with: Hibiclens and Normal Saline    Moisturize dry skin on leg with Enlivaderm    Protect periwound with:  Gentian Violet hugging wound bed, Betadine to plantar foot  Primary dressing: WOUND BASE: Iodosorb and endoform (confetti mix)  Secondary dressing:  Mextra 5" x 9" laid vertically.  Abram foam long (laid vertically)  Compression/Off-load: Football (cast padding x 1 1/2 rolls, 1/2 roll fan folded to plantar for cushion) - open toe. Tubigrip, size D, double layer (calf size 34 cm) CAM Boot on the affected foot     Change at Nurse Visit.  - If dressing is saturated to plantar aspect, apply UrgoK2 (40 mmHg) with open toe football   Left Deltoid (Per MD recommendation) Flushed with 40 mL of Vashe  Periwound: Cavilon, Gentian Violet hugging wound bed.  Primary Dressing: Hydrofera Blue Rope (1 rope inserted into wound bed with 1 inch tail to wick out fluid) Drawtex (2" x 2") then Mepilex border " "dressing (6" x 6") - change at nurse visit      Follow up in about 4 days (around 7/23/2024) for Nurse Visit.         " "Area (sq cm):  18.7       Width (cm):  3.4       Percent Debrided (%):  100       Depth (cm):  0.5       Total Area Debrided (sq cm):  18.7    Depth of debridement:  Bone    Tissue debrided:  Bone, Dermis, Epidermis and Subcutaneous    Devitalized tissue debrided:  Callus, Biofilm and Fibrin    Instruments:  Forceps, Nippers, Curette and Rongeur  Bleeding:  Moderate  Hemostasis Achieved: Yes  Method Used:  Pressure  Patient tolerance:  Patient tolerated the procedure well with no immediate complications      Tissue pathology and/or culture taken     [] Yes      [x] No  Sharp debridement performed                   [x] Yes       [] No  Labs ordered     [] Yes       [x] No  Imaging ordered    [] Yes      [x] No    Orders Placed This Encounter   Procedures    Debridement     This order was created via procedure documentation     Standing Status:   Standing     Number of Occurrences:   1    Change dressing     Wound Dressing Orders      Dressing change frequency twice a week (prn nurse visit)  Remove old dressing  Cleanse or irrigate with: Hibiclens and Normal Saline    Moisturize dry skin on leg with Enlivaderm    Protect periwound with:  Gentian Violet hugging wound bed, Betadine to plantar foot  Primary dressing: WOUND BASE: Iodosorb and endoform (confetti mix)  Secondary dressing:  Mextra 5" x 9" laid vertically.  Abram foam long (laid vertically)  Compression/Off-load: Football (cast padding x 1 1/2 rolls, 1/2 roll fan folded to plantar for cushion) - open toe. Tubigrip, size D, double layer (calf size 34 cm) CAM Boot on the affected foot     Change at Nurse Visit.  - If dressing is saturated to plantar aspect, apply UrgoK2 (40 mmHg) with open toe football        Follow up in about 4 days (around 7/23/2024) for Nurse Visit.       "

## 2024-07-23 ENCOUNTER — HOSPITAL ENCOUNTER (OUTPATIENT)
Dept: WOUND CARE | Facility: HOSPITAL | Age: 54
Discharge: HOME | End: 2024-07-23
Attending: PODIATRIST
Payer: MEDICARE

## 2024-07-23 VITALS
TEMPERATURE: 97 F | DIASTOLIC BLOOD PRESSURE: 66 MMHG | RESPIRATION RATE: 18 BRPM | SYSTOLIC BLOOD PRESSURE: 113 MMHG | HEART RATE: 115 BPM

## 2024-07-23 DIAGNOSIS — L02.414 ABSCESS OF LEFT ARM: ICD-10-CM

## 2024-07-23 DIAGNOSIS — M86.10 ACUTE ON CHRONIC OSTEOMYELITIS: ICD-10-CM

## 2024-07-23 DIAGNOSIS — E11.621 DIABETIC ULCER OF LEFT MIDFOOT ASSOCIATED WITH TYPE 2 DIABETES MELLITUS, WITH BONE INVOLVEMENT WITHOUT EVIDENCE OF NECROSIS: Primary | ICD-10-CM

## 2024-07-23 DIAGNOSIS — M86.60 ACUTE ON CHRONIC OSTEOMYELITIS: ICD-10-CM

## 2024-07-23 DIAGNOSIS — L97.426 DIABETIC ULCER OF LEFT MIDFOOT ASSOCIATED WITH TYPE 2 DIABETES MELLITUS, WITH BONE INVOLVEMENT WITHOUT EVIDENCE OF NECROSIS: Primary | ICD-10-CM

## 2024-07-23 PROCEDURE — 99213 OFFICE O/P EST LOW 20 MIN: CPT

## 2024-07-23 NOTE — PROGRESS NOTES
[facility]  [address]  [address]  Nurse Visit    Subjective:       Patient seen in clinic today. Patient ambulated with assistance of knee scooter for LLE to [facility]. Prescribed CAM Boot on left Foot. Patient denies fever, chills, nausea, vomiting or diarrhea at present. Patient denies pain or discomfort to wound bed at present.  Dressing appears intact without strikethrough drainage. Dressings removed & wounds cleaned by nurse. Left Plantar Foot wound is friable when cleaning. Left Deltoid wound appears stable. Patient declined AVS, has MyChart.  Dressing changed as ordered.      Assessment:          (Retired) Wound 04/08/22 1137 Diabetic Ulcer Left plantar;lateral Foot (Active)   04/08/22 1137    Pre-existing: Yes   Primary Wound Type: Diabetic ulc   Side: Left   Orientation: plantar;lateral   Location: Foot   Wound Number:    Ankle-Brachial Index:    Pulses:    Removal Indication and Assessment:    Wound Outcome:    (Retired) Wound Type:    (Retired) Wound Length (cm):    (Retired) Wound Width (cm):    (Retired) Depth (cm):    Wound Description (Comments):    Removal Indications:    Wound Image   07/23/24 1525   Dressing Appearance Intact;Moist drainage 07/23/24 1525   Drainage Amount Moderate 07/23/24 1525   Drainage Characteristics/Odor Serosanguineous;No odor 07/23/24 1525   Appearance Pink;Red;Moist;Bleeding 07/23/24 1525   Tissue loss description Full thickness 07/23/24 1525   Black (%), Wound Tissue Color 0 % 07/23/24 1525   Red (%), Wound Tissue Color 90 % 07/23/24 1525   Yellow (%), Wound Tissue Color 10 % 07/23/24 1525   Periwound Area Pale white 07/23/24 1525   Wound Edges Defined;Open 07/23/24 1525   Care Cleansed with:;Antimicrobial agent;Sterile normal saline;Wound cleanser 07/23/24 1525   Dressing Changed 07/23/24 1525   Periwound Care Moisture barrier applied;Absorptive dressing applied 07/23/24 1525   Compression Tubular elasticized bandage 07/23/24 1525    Off Loading Football dressing;Off loading shoe 07/23/24 1525   Dressing Change Due 07/26/24 07/23/24 1525            Wound 06/25/24 Abscess Left upper Arm (Active)   06/25/24    Present on Original Admission: Y   Primary Wound Type: Abscess   Side: Left   Orientation: upper   Location: Arm   Wound Approximate Age at First Assessment (Weeks):    Wound Number:    Is this injury device related?:    Incision Type:    Closure Method:    Wound Description (Comments):    Type:    Additional Comments:    Ankle-Brachial Index:    Pulses:    Removal Indication and Assessment:    Wound Outcome:    Dressing Appearance Intact;Moist drainage 07/23/24 1525   Drainage Amount Large 07/23/24 1525   Drainage Characteristics/Odor Serosanguineous;Yellow;No odor 07/23/24 1525   Appearance Pink;Moist 07/23/24 1525   Tissue loss description Full thickness 07/23/24 1525   Black (%), Wound Tissue Color 0 % 07/23/24 1525   Red (%), Wound Tissue Color 100 % 07/23/24 1525   Yellow (%), Wound Tissue Color 0 % 07/23/24 1525   Periwound Area Dry 07/23/24 1525   Wound Edges Defined;Open 07/23/24 1525   Wound Length (cm) 0.8 cm 07/23/24 1525   Wound Width (cm) 0.8 cm 07/23/24 1525   Wound Depth (cm) 2 cm 07/23/24 1525   Wound Volume (cm^3) 1.28 cm^3 07/23/24 1525   Wound Surface Area (cm^2) 0.64 cm^2 07/23/24 1525   Tunneling (depth (cm)/location) 0 07/23/24 1525   Care Cleansed with:;Antimicrobial agent;Sterile normal saline;Wound cleanser 07/23/24 1525   Dressing Changed 07/23/24 1525   Packing packed with 07/23/24 1525   Packing Inserted  1 07/23/24 1525   Packing Removed 1 07/23/24 1525   Periwound Care Skin barrier film applied;Absorptive dressing applied 07/23/24 1525   Dressing Change Due 07/26/24 07/23/24 1525           Plan:       Wound Dressing Orders      Dressing change frequency twice a week (prn nurse visit)  Remove old dressing  Cleanse or irrigate with: Hibiclens and Normal Saline     Moisturize dry skin on leg with Enlivaderm    "  Protect periwound with:  Betadine hugging wound bed  Primary dressing: WOUND BASE: Iodosorb and Endoform (confetti mix)  Secondary dressing:  Mextra 5" x 9" laid vertically.  Abram foam long (laid vertically)  Compression/Off-load: Football (cast padding x 1 1/2 rolls, 1/2 roll fan folded to plantar for cushion) - open toe. Tubigrip, size D, double layer (calf size 34 cm) CAM Boot on the affected foot     Left Deltoid (Per MD recommendation) Flush with 20 mL of Vashe    Periwound: Cavilon    Primary Dressing: [place] then Hydrofera Blue Rope (1/2 rope inserted into wound bed with 1 inch tail to wick out fluid) then Mepilex border dressing (6" x 6")               Follow up in about 3 days (around 7/26/2024) for Wound Care.          "

## 2024-07-24 ENCOUNTER — TELEPHONE (OUTPATIENT)
Dept: INFECTIOUS DISEASES | Facility: CLINIC | Age: 54
End: 2024-07-24

## 2024-07-24 ENCOUNTER — OFFICE VISIT (OUTPATIENT)
Dept: INFECTIOUS DISEASES | Facility: CLINIC | Age: 54
End: 2024-07-24
Payer: MEDICARE

## 2024-07-24 DIAGNOSIS — M86.672 CHRONIC OSTEOMYELITIS OF LEFT FOOT: Primary | ICD-10-CM

## 2024-07-24 NOTE — Clinical Note
Please schedule labs for Friday- cmp, crp, cpk and IR referrals . Thanks ( he wants to wait until next week for line placement bc he is still not sure if he wants IV antibiotics)

## 2024-07-24 NOTE — TELEPHONE ENCOUNTER
Outpatient Antibiotic Therapy Plan:    Referral to [facility].  Referral for Home Health services.  PICC placement at [facility]- waiting for appointment    1) Infection: Acute on chronic osteomyelitis     2) Antibiotics:    Intravenous antibiotics:  Ampicillin 12g IV q 24 hours via continuous infusion      3) Therapy Duration:  6 weeks    4) Outpatient Weekly Labs:    Order the following labs to be drawn on Mondays:   CBC  CMP   CRP    5) Fax Lab Results to Infectious Diseases Provider: Maria Dolores Clayton    [facility] Fax Number: [phone]    6) Ordering Information:    Orders Mode: Verbal with readback  Ordering Provider: Maria Dolores Clayton

## 2024-07-24 NOTE — PROGRESS NOTES
The patient location is: home  The chief complaint leading to consultation is: osteo L heel    Visit type: audiovisual    Face to Face time with patient: 15 mins  50 minutes of total time spent on the encounter, which includes face to face time and non-face to face time preparing to see the patient (eg, review of tests), Obtaining and/or reviewing separately obtained history, Documenting clinical information in the electronic or other health record, Independently interpreting results (not separately reported) and communicating results to the patient/family/caregiver, or Care coordination (not separately reported).         Each patient to whom he or she provides medical services by telemedicine is:  (1) informed of the relationship between the physician and patient and the respective role of any other health care provider with respect to management of the patient; and (2) notified that he or she may decline to receive medical services by telemedicine and may withdraw from such care at any time.    Notes:     Infectious Disease Clinic Note  09/11/23      Subjective:       Patient ID: Indio Ryder Jr. is a 54 y.o. male being seen for a followup visit.    Chief Complaint: No chief complaint on file.    HPI     Mr. Ryder is a 51 y/o M with hx DM and numerous episodes of foot osteo presents for f/up of Lt foot chronic osteomyelitis.    Interval hx 7/24/24:  Pt with chronic refractory osteomyelitis of the left plantar midfoot. Despite being on oral antibiotics and antibiotic powder he continues to regress and have exposed bone per podiatry. Denies signs or symptoms of systemic infection. Reports med adherence to augmentin. Recent bone cx 7/5 w/o growth. Recent images of foot wound reviewed.    Pt states wound was dry and looking better when he went for wound care yesterday. He thinks the increased drainage is due to the wrapping and not necessarily the infection. He would like to wait until his next visit with  podiatry/ wound care before agreeing to IV abx. Denies signs and symptoms of systemic infection.    Additionally reports hx of lt shoulder abscess for which he saw gen hyun on 7/12 who lanced abscess. Notes he is no lomnger having purulent drainage or erythema. Completed course of bactrim.             Additional hx :  Previously seen on 10/11/2023 by Damien Richey. He was prescribed a 6 month course of augmentin for chronic osteo, but pt reported he had stopped taking med. Bone Biopsy 5/17/24 revealed chronic osteomyelitis; bone cx grew enterococcus faecalis.    A previous wound cx 4/2/24 grew pseudomonas and klebsiella. On 5/17 he was prescribed levofloxacin 750mg - taking every other day.      Bone cx 7/2023 grew staph lugdunensis and e faecalis    Recent Imaging  6/24 arterial US:   Peripheral arterial disease with monophasic and biphasic waveforms in the left lower extremity.  No focal hemodynamically significant arterial stenosis        Family History   Adopted: Yes   Problem Relation Name Age of Onset    Hypertension Mother      Cancer Mother          breast    Diabetes Mother      Hypertension Father      Cataracts Father      Heart disease Father          mi    Diabetes Sister Michelle     No Known Problems Brother      Heart disease Sister Manuela         MI    No Known Problems Sister      Hypertension Maternal Aunt      No Known Problems Maternal Uncle      No Known Problems Paternal Aunt      No Known Problems Paternal Uncle      No Known Problems Maternal Grandmother      No Known Problems Maternal Grandfather      No Known Problems Paternal Grandmother      No Known Problems Paternal Grandfather      Amblyopia Neg Hx      Blindness Neg Hx      Glaucoma Neg Hx      Macular degeneration Neg Hx      Retinal detachment Neg Hx      Strabismus Neg Hx      Stroke Neg Hx      Thyroid disease Neg Hx       Social History     Socioeconomic History    Marital status: Single    Number of children: 0   Occupational  History    Occupation: Retired     Employer: [employer]   Tobacco Use    Smoking status: Never    Smokeless tobacco: Never   Substance and Sexual Activity    Alcohol use: No    Drug use: No    Sexual activity: Yes     Partners: Female     Birth control/protection: Condom     Social Determinants of Health     Financial Resource Strain: Low Risk  (7/2/2024)    Overall Financial Resource Strain (CARDIA)     Difficulty of Paying Living Expenses: Not hard at all   Food Insecurity: No Food Insecurity (7/2/2024)    Hunger Vital Sign     Worried About Running Out of Food in the Last Year: Never true     Ran Out of Food in the Last Year: Never true   Transportation Needs: No Transportation Needs (7/13/2023)    PRAPARE - Transportation     Lack of Transportation (Medical): No     Lack of Transportation (Non-Medical): No   Physical Activity: Unknown (7/2/2024)    Exercise Vital Sign     Days of Exercise per Week: 0 days   Recent Concern: Physical Activity - Inactive (7/2/2024)    Exercise Vital Sign     Days of Exercise per Week: 0 days     Minutes of Exercise per Session: 0 min   Stress: No Stress Concern Present (7/13/2023)    [employer] [place] [employer] - Occupational Stress Questionnaire     Feeling of Stress : Not at all   Housing Stability: Low Risk  (7/13/2023)    Housing Stability Vital Sign     Unable to Pay for Housing in the Last Year: No     Number of Places Lived in the Last Year: 1     Unstable Housing in the Last Year: No     Past Surgical History:   Procedure Laterality Date    COLONOSCOPY Right 1/19/2022    Procedure: COLONOSCOPY;  Surgeon: Tj Haile MD;  Location: [facility] (4TH FLR);  Service: Endoscopy;  Laterality: Right;  previous order un-usable/poor prep 1/18/22  RAPID COVID test arrival 12:20  instructions handed to pt- sm    COLONOSCOPY N/A 3/21/2022    Procedure: COLONOSCOPY;  Surgeon: Sheng Haque MD;  Location: [facility] (2ND FLR);  Service: Endoscopy;  Laterality: N/A;   Colonoscopy with AES for hepatix flexure polyp removal, 2nd floor  Pt is fully vaccinated-DS  Pt prescribed Plavix by Dr. Stahl in Jan. 2022, however pt states that he never started taking it-DS  3/16/22-Instructions sent via portal-DS    COLONOSCOPY N/A 9/13/2023    Procedure: COLONOSCOPY;  Surgeon: Erik Stout MD;  Location: [facility] (4TH FLR);  Service: Colon and Rectal;  Laterality: N/A;  Referred by Dr. Stout, timur, [facility], portal -ml    DEBRIDEMENT OF FOOT Left 7/14/2019    Procedure: DEBRIDEMENT, FOOT, with left 5th ray amputation;  Surgeon: Sis Hickman DPM;  Location: [facility] OR 2ND FLR;  Service: Podiatry;  Laterality: Left;    DEBRIDEMENT OF FOOT Left 7/17/2019    Procedure: DEBRIDEMENT, FOOT with leftt 5th ray partial amputation with Neox Graft;  Surgeon: Mai Burrell DPM;  Location: [facility] OR 2ND FLR;  Service: Podiatry;  Laterality: Left;  t    DEBRIDEMENT OF FOOT Bilateral 4/29/2021    Procedure: DEBRIDEMENT, FOOT;  Surgeon: Mai Burrell DPM;  Location: [facility] OR 1ST FLR;  Service: Podiatry;  Laterality: Bilateral;  Graft application    DEBRIDEMENT OF FOOT Left 7/31/2023    Procedure: DEBRIDEMENT, FOOT;  Surgeon: Marivel Peterson DPM;  Location: [facility] OR 2ND FLR;  Service: Podiatry;  Laterality: Left;    TOE AMPUTATION Right 06/05/2015    TOE AMPUTATION Right 01/19/2017    XI ROBOTIC COLECTOMY, RIGHT N/A 4/25/2022    Procedure: XI ROBOTIC COLECTOMY, RIGHT (lithotomy, eras low);  Surgeon: Erik Stout MD;  Location: [facility] OR 2ND FLR;  Service: Colon and Rectal;  Laterality: N/A;  Paruch to Goodwin    XI ROBOTIC COLECTOMY, RIGHT  4/25/2022    Procedure: ;  Surgeon: Erik Stout MD;  Location: [facility] OR 2ND FLR;  Service: Colon and Rectal;;       Patient's Medications   New Prescriptions    No medications on file   Previous Medications    AMOXICILLIN-CLAVULANATE 875-125MG (AUGMENTIN) 875-125 MG PER TABLET    Take 1 tablet by mouth 2 (two) times a day.     "ATORVASTATIN (LIPITOR) 80 MG TABLET    Take 1 tablet (80 mg total) by mouth once daily.    EMPAGLIFLOZIN (JARDIANCE) 25 MG TABLET    Take 1 tablet (25 mg total) by mouth once daily.    INSULIN ASPART U-100 (NOVOLOG) 100 UNIT/ML (3 ML) INPN PEN    Inject if sugar is > 180 up to 4x a day , 180-230+2, 231-280+4, 281-330+6, 331-380+8, >380+10. Max daily 40 units    INSULIN GLARGINE U-300 CONC (TOUJEO MAX U-300 SOLOSTAR) 300 UNIT/ML (3 ML) INSULIN PEN    Inject 26 to 32 units under the skin at night    ONETOUCH DELICA LANCETS 33 GAUGE MISC        ONETOUCH ULTRA2 KIT        PEN NEEDLE, DIABETIC (BD SASCHA 2ND GEN PEN NEEDLE) 32 GAUGE X 5/32" NDLE    Use 4 times a day   Modified Medications    No medications on file   Discontinued Medications    No medications on file       Patient Active Problem List    Diagnosis Date Noted    Abscess of left arm 07/02/2024    Blister (nonthermal), left foot, sequela 02/16/2024    Blister (nonthermal), left foot, initial encounter 01/26/2024    Equinus deformity of both feet 11/17/2023    H/O amputation of lesser toe, right 11/17/2023    Right great toe amputee 11/17/2023    Chronic osteomyelitis of left foot 10/20/2023    Type II diabetes mellitus with neurological manifestations 09/29/2023    Eversion deformity of foot, left 09/15/2023    Hx of amputation of lesser toe, left 09/15/2023    Pre-ulcerative calluses 06/30/2023    Chronic osteomyelitis of right foot 12/09/2022    Diabetic ulcer of right foot associated with type 2 diabetes mellitus, with fat layer exposed 04/26/2022    History of colon cancer 04/12/2022    Acute on chronic osteomyelitis 04/29/2021    Diabetic ulcer of left foot 04/28/2021    Septic arthritis of left foot 04/28/2021    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 edema, with long-term current use of insulin 03/23/2021    Chronic left shoulder pain 07/30/2020    Allergic reaction due to " "antibacterial drug 04/05/2020    Subacute osteomyelitis of left foot 03/11/2020    Hypertensive retinopathy, bilateral 01/28/2020    Diabetic ulcer of left midfoot associated with type 2 diabetes mellitus, with bone involvement without evidence of necrosis 08/02/2019    Diabetic ulcer of right midfoot associated with type 2 diabetes mellitus, with necrosis of bone 07/12/2019    Neck pain 11/08/2017    Hypokalemia 05/30/2017    Actinomyces infection 01/17/2017     Right foot      Type 2 diabetes mellitus with hyperglycemia, with long-term current use of insulin 05/03/2016    Mixed hyperlipidemia 08/12/2014    Essential hypertension 06/06/2013       Review of Systems   Review of Systems   Constitutional:  Negative for chills, fever and malaise/fatigue.   Respiratory:  Negative for cough and shortness of breath.    Cardiovascular:  Negative for chest pain and orthopnea.   Gastrointestinal:  Negative for abdominal pain, diarrhea and vomiting.   Genitourinary:  Negative for dysuria.   Musculoskeletal:  Negative for back pain and myalgias.        L foot wound   Skin:         L shoulder erythema+  L heel ulcer+   Neurological:  Negative for sensory change and weakness.           Objective:      There were no vitals taken for this visit.  Estimated body mass index is 27.04 kg/m² as calculated from the following:    Height as of 7/12/24: 6' 1" (1.854 m).    Weight as of 7/12/24: 92.9 kg (204 lb 14.7 oz).    Physical Exam  Constitutional:       Appearance: Normal appearance.   Pulmonary:      Effort: Pulmonary effort is normal.   Neurological:      Mental Status: He is alert.     Images reviewed.      Assessment:         1. Chronic osteomyelitis of left foot  Ambulatory referral/consult to Interventional RAD    FL PICC Line Placement w/o Port w/ Img > 6 Y/O    BASIC METABOLIC PANEL    C-REACTIVE PROTEIN    CK                Plan:         Diabetic ulcer of left midfoot associated with type 2 diabetes mellitus, with fat layer " exposed  53y/o M with hx of LLE DM foot ulcers c/b chronic osteo of the L midfoot s/p numerous courses of IV and PO antibiotics. bone cx from 5/17/24 again growing e faecalis (amp (S)),path with chronic osteo. Suspect due to nonadherence not antibiotic failure. Arterial US w/o evidence of significant stenosis.    Discussed case with Dr. Peterson who is concerned that pt is at high-risk for loss of limb as wound has failed to improve on oral abx and continues to have exposed bone. Discussed IV abx with patient given lack of improvement on po augmentin. Pt wanting to wait until his podiatry visit / wound check on Friday prior to agreeing to IV therapy.     Bone cx have grown   staph lug/ e faacalis (7/2023). E faecalis (5/17/24). (7/5/24) ngtd    Plan:   --start dapto 8mg/kg/day for ease of dosing   --f/u with podiatry Friday; if no improvement will plan for line placement.  --baseline cmp, crp, cpk  --referral to IR placed for picc line placement    Lt shoulder cellulitis  Hx of boil s/p I+D. Cx 6/25 neg. Resolved.       Rtc in 2-3 wks  Maria Dolores Tan MD  Infectious Disease     Total professional time spent for the encounter: 50 minutes  Time was spent preparing to see the patient, reviewing results of prior testing, obtaining and/or reviewing separately obtained history, performing a medically appropriate examination and interview, counseling and educating the patient/family, ordering medications/tests/procedures, referring and communicating with other health care professionals, documenting clinical information in the electronic health record, and independently interpreting results.     Visit today included increased complexity associated with the care of the episodic problem osteomyelitis of L foot addressed and managing the longitudinal care of the patient due to the serious and/or complex managed problem(s) osteomyelitis left foot, wound infection.

## 2024-07-26 ENCOUNTER — HOSPITAL ENCOUNTER (OUTPATIENT)
Dept: WOUND CARE | Facility: HOSPITAL | Age: 54
Discharge: HOME | End: 2024-07-26
Attending: PODIATRIST
Payer: MEDICARE

## 2024-07-26 ENCOUNTER — PATIENT MESSAGE (OUTPATIENT)
Dept: INFECTIOUS DISEASES | Facility: CLINIC | Age: 54
End: 2024-07-26
Payer: MEDICARE

## 2024-07-26 ENCOUNTER — LAB VISIT (OUTPATIENT)
Dept: LAB | Facility: HOSPITAL | Age: 54
End: 2024-07-26
Attending: INTERNAL MEDICINE
Payer: MEDICARE

## 2024-07-26 ENCOUNTER — TELEPHONE (OUTPATIENT)
Dept: INFECTIOUS DISEASES | Facility: CLINIC | Age: 54
End: 2024-07-26
Payer: MEDICARE

## 2024-07-26 VITALS — DIASTOLIC BLOOD PRESSURE: 90 MMHG | HEART RATE: 107 BPM | TEMPERATURE: 97 F | SYSTOLIC BLOOD PRESSURE: 149 MMHG

## 2024-07-26 DIAGNOSIS — L97.426 DIABETIC ULCER OF LEFT MIDFOOT ASSOCIATED WITH TYPE 2 DIABETES MELLITUS, WITH BONE INVOLVEMENT WITHOUT EVIDENCE OF NECROSIS: Primary | ICD-10-CM

## 2024-07-26 DIAGNOSIS — M86.60 ACUTE ON CHRONIC OSTEOMYELITIS: ICD-10-CM

## 2024-07-26 DIAGNOSIS — M86.672 CHRONIC OSTEOMYELITIS OF LEFT FOOT: ICD-10-CM

## 2024-07-26 DIAGNOSIS — M86.10 ACUTE ON CHRONIC OSTEOMYELITIS: ICD-10-CM

## 2024-07-26 DIAGNOSIS — E11.621 DIABETIC ULCER OF LEFT MIDFOOT ASSOCIATED WITH TYPE 2 DIABETES MELLITUS, WITH BONE INVOLVEMENT WITHOUT EVIDENCE OF NECROSIS: Primary | ICD-10-CM

## 2024-07-26 LAB
ANION GAP SERPL CALC-SCNC: 10 MMOL/L (ref 8–16)
BUN SERPL-MCNC: 12 MG/DL (ref 6–20)
CALCIUM SERPL-MCNC: 9.5 MG/DL (ref 8.7–10.5)
CHLORIDE SERPL-SCNC: 98 MMOL/L (ref 95–110)
CK SERPL-CCNC: 48 U/L (ref 20–200)
CO2 SERPL-SCNC: 27 MMOL/L (ref 23–29)
CREAT SERPL-MCNC: 1.4 MG/DL (ref 0.5–1.4)
CRP SERPL-MCNC: 7.8 MG/L (ref 0–8.2)
EST. GFR  (NO RACE VARIABLE): 59 ML/MIN/1.73 M^2
GLUCOSE SERPL-MCNC: 390 MG/DL (ref 70–110)
POTASSIUM SERPL-SCNC: 4.3 MMOL/L (ref 3.5–5.1)
SODIUM SERPL-SCNC: 135 MMOL/L (ref 136–145)

## 2024-07-26 PROCEDURE — 86140 C-REACTIVE PROTEIN: CPT | Performed by: INTERNAL MEDICINE

## 2024-07-26 PROCEDURE — 11043 DBRDMT MUSC&/FSCA 1ST 20/<: CPT | Mod: ,,, | Performed by: PODIATRIST

## 2024-07-26 PROCEDURE — 11042 DBRDMT SUBQ TIS 1ST 20SQCM/<: CPT | Performed by: PODIATRIST

## 2024-07-26 PROCEDURE — 82550 ASSAY OF CK (CPK): CPT | Performed by: INTERNAL MEDICINE

## 2024-07-26 PROCEDURE — 11046 DBRDMT MUSC&/FSCA EA ADDL: CPT | Mod: ,,, | Performed by: PODIATRIST

## 2024-07-26 PROCEDURE — 80048 BASIC METABOLIC PNL TOTAL CA: CPT | Performed by: INTERNAL MEDICINE

## 2024-07-26 PROCEDURE — 36415 COLL VENOUS BLD VENIPUNCTURE: CPT | Performed by: INTERNAL MEDICINE

## 2024-07-26 PROCEDURE — 99499 UNLISTED E&M SERVICE: CPT | Mod: ,,, | Performed by: PODIATRIST

## 2024-07-26 NOTE — PROGRESS NOTES
[facility]                Progress Note    Subjective:       Patient ID: Indio Ryder Jr. is a 54 y.o. male.    Chief Complaint: Dressing Change and Diabetic Foot Ulcer    Follow Up wound care visit. Patient ambulated with assistance of knee scooter to [facility]. Prescribed CAM Boot on left Foot. Patient denies fever, chills, nausea, vomiting or diarrhea at present. Patient denies pain or discomfort to wound beds at present. Patient reports not having any issues with dressing Tubigrip since last visit. Dressing appears intact without strike through, Tubigrip is well-placed. Dressing removed by MA and left on side tray for nurse to assess, then wounds were cleaned MA. Left Plantar Foot wound bed is pink and moist. Patient states he is agreeable to have ID give him bulb infusion ABX therapy vs PICC line therapy due to ease of use. He states he will send a message to ID to discuss.    No change to dressing order; applied per MD orders. Patient will return to [facility] on Tuesday for a Nurse Visit and to see DPM in 1 week. Patient declined AVS, has Ashlit.     MD verbal order for Left Deltoid: Clean wound with normal saline, then flush with 20 mL of Vashe. Cavilon to periwound. Apply [place] to wound bed then pack with Hydrofera Blue rope with 1 inch tail wicking to Mepilex border dressing. Change at nurse visit.        Review of Systems      Objective:        Physical Exam    Vitals:    07/26/24 1139   BP: (!) 149/90   Pulse: 107   Temp: 97.4 °F (36.3 °C)       Assessment:           ICD-10-CM ICD-9-CM   1. Diabetic ulcer of left midfoot associated with type 2 diabetes mellitus, with bone involvement without evidence of necrosis  E11.621 250.80    L97.426 707.14   2. Acute on chronic osteomyelitis  M86.10 730.00    M86.60 730.10            (Retired) Wound 04/08/22 1137 Diabetic Ulcer Left plantar;lateral Foot (Active)   04/08/22 1137    Pre-existing: Yes   Primary Wound Type:  Diabetic ulc   Side: Left   Orientation: plantar;lateral   Location: Foot   Wound Number:    Ankle-Brachial Index:    Pulses:    Removal Indication and Assessment:    Wound Outcome:    (Retired) Wound Type:    (Retired) Wound Length (cm):    (Retired) Wound Width (cm):    (Retired) Depth (cm):    Wound Description (Comments):    Removal Indications:    Wound Image   07/26/24 1036   Wound WDL ex 07/26/24 1036   Dressing Appearance Intact;Moist drainage 07/26/24 1036   Drainage Amount Moderate 07/26/24 1036   Drainage Characteristics/Odor Serosanguineous;No odor 07/26/24 1036   Appearance Pink;Red;Moist 07/26/24 1036   Tissue loss description Full thickness 07/26/24 1036   Black (%), Wound Tissue Color 0 % 07/26/24 1036   Red (%), Wound Tissue Color 100 % 07/26/24 1036   Yellow (%), Wound Tissue Color 0 % 07/26/24 1036   Periwound Area Pale white 07/26/24 1036   Wound Edges Open;Defined 07/26/24 1036   Wound Length (cm) 7 cm 07/26/24 1036   Wound Width (cm) 4 cm 07/26/24 1036   Wound Depth (cm) 0.4 cm 07/26/24 1036   Wound Volume (cm^3) 11.2 cm^3 07/26/24 1036   Wound Surface Area (cm^2) 28 cm^2 07/26/24 1036   Tunneling (depth (cm)/location) 0 07/26/24 1036   Undermining (depth (cm)/location) 0 07/26/24 1036   Care Cleansed with:;Antimicrobial agent;Sterile normal saline;Wound cleanser;Debrided 07/26/24 1036   Dressing Changed 07/26/24 1036   Periwound Care Moisture barrier applied 07/26/24 1036   Compression Tubular elasticized bandage 07/26/24 1036   Off Loading Football dressing;Off loading shoe 07/26/24 1036   Dressing Change Due 07/30/24 07/26/24 1036            Wound 06/25/24 Abscess Left upper Arm (Active)   06/25/24    Present on Original Admission: Y   Primary Wound Type: Abscess   Side: Left   Orientation: upper   Location: Arm   Wound Approximate Age at First Assessment (Weeks):    Wound Number:    Is this injury device related?:    Incision Type:    Closure Method:    Wound Description (Comments):     Type:    Additional Comments:    Ankle-Brachial Index:    Pulses:    Removal Indication and Assessment:    Wound Outcome:    Wound Image   07/26/24 1036   Dressing Appearance Intact;Moist drainage 07/26/24 1036   Drainage Amount Moderate 07/26/24 1036   Drainage Characteristics/Odor Serosanguineous;No odor 07/26/24 1036   Appearance Pink;Yellow;Moist 07/26/24 1036   Tissue loss description Full thickness 07/26/24 1036   Black (%), Wound Tissue Color 0 % 07/26/24 1036   Red (%), Wound Tissue Color 95 % 07/26/24 1036   Yellow (%), Wound Tissue Color 5 % 07/26/24 1036   Periwound Area Dry;Intact 07/26/24 1036   Wound Edges Defined;Open 07/26/24 1036   Wound Length (cm) 0.7 cm 07/26/24 1036   Wound Width (cm) 0.7 cm 07/26/24 1036   Wound Depth (cm) 1.4 cm 07/26/24 1036   Wound Volume (cm^3) 0.686 cm^3 07/26/24 1036   Wound Surface Area (cm^2) 0.49 cm^2 07/26/24 1036   Tunneling (depth (cm)/location) 0 07/26/24 1036   Undermining (depth (cm)/location) 1.5 cm circ 07/26/24 1036   Care Cleansed with:;Antimicrobial agent;Sterile normal saline 07/26/24 1036   Dressing Changed 07/26/24 1036   Packing packing removed;packed with 07/26/24 1036   Packing Inserted  1 07/26/24 1036   Packing Removed 1 07/26/24 1036   Periwound Care Skin barrier film applied;Absorptive dressing applied 07/26/24 1036   Dressing Change Due 07/30/24 07/26/24 1036           Plan:              Tissue pathology and/or culture taken     [] Yes      [x] No  Sharp debridement performed                   [x] Yes       [] No  Labs ordered     [] Yes       [x] No  Imaging ordered    [] Yes      [x] No    Orders Placed This Encounter   Procedures    Change dressing     Wound Dressing Orders       Dressing change frequency twice a week (prn nurse visit)   Remove old dressing   Cleanse or irrigate with: Hibiclens and Normal Saline, Vashe 5 minute soak     Moisturize dry skin on leg with Enlivaderm     Protect periwound with:  Gentian Violet hugging wound bed  "  Primary dressing: WOUND BASE: Iodosorb and endoform (confetti mix) to wound bed  Secondary dressing:  Mextra 5" x 9" laid vertically.  Abram foam long x 2  (laid vertically)   Compression/Off-load: Open-Toe Football (cast padding x 2 rolls). Tubigrip, size D, double layer (calf size 34 cm) CAM Boot on the affected foot      Change at Nurse Visit.   - If dressing is saturated to plantar aspect, apply UrgoK2 (40 mmHg) with open toe football     See clinic note for Left Deltoid        Follow up in about 4 days (around 7/30/2024) for Nurse Visit.         " "he will send a message to ID to discuss.    Patient agreeable to surgical consult with Dr. Reyes to assess need for foot surgery, scheduled for August, patient placed on waiting list for earlier appointment    Wound Debridement    Date/Time: 7/26/2024 8:45 AM    Performed by: Marivel Peterson DPM  Authorized by: Marivel Peterson DPM      Wound Details:    Location:  Left foot    Location:  Left Plantar    Type of Debridement:  Excisional       Length (cm):  7       Area (sq cm):  28       Width (cm):  4       Percent Debrided (%):  100       Depth (cm):  0.4       Total Area Debrided (sq cm):  28    Depth of debridement:  Muscle/fascia/tendon    Tissue debrided:  Dermis, Epidermis, Subcutaneous, Fascia and Muscle    Devitalized tissue debrided:  Callus and Fibrin    Instruments:  Curette, Forceps, Rongeur and Scissors  Bleeding:  Moderate  Hemostasis Achieved: Yes  Method Used:  Pressure  Patient tolerance:  Patient tolerated the procedure well with no immediate complications      Tissue pathology and/or culture taken     [] Yes      [x] No  Sharp debridement performed                   [x] Yes       [] No  Labs ordered     [] Yes       [x] No  Imaging ordered    [] Yes      [x] No    Orders Placed This Encounter   Procedures    Debridement     This order was created via procedure documentation     Standing Status:   Standing     Number of Occurrences:   1    Change dressing     Wound Dressing Orders       Dressing change frequency twice a week (prn nurse visit)   Remove old dressing   Cleanse or irrigate with: Hibiclens and Normal Saline, Vashe 5 minute soak     Moisturize dry skin on leg with Enlivaderm     Protect periwound with:  Gentian Violet hugging wound bed   Primary dressing: WOUND BASE: Iodosorb and endoform (confetti mix) to wound bed  Secondary dressing:  Mextra 5" x 9" laid vertically.  Abram foam long x 2  (laid vertically)   Compression/Off-load: Open-Toe Football (cast padding x 2 rolls). Blayne, " size D, double layer (calf size 34 cm) CAM Boot on the affected foot      Change at Nurse Visit.   - If dressing is saturated to plantar aspect, apply UrgoK2 (40 mmHg) with open toe football     See clinic note for Left Deltoid        Follow up in about 4 days (around 7/30/2024) for Nurse Visit.

## 2024-07-26 NOTE — TELEPHONE ENCOUNTER
Outpatient Antibiotic Therapy Plan:     Referral to [facility].  Referral for Home Health services.  PICC placement at [facility]- waiting for appointment     1) Infection: Acute on chronic osteomyelitis      2) Antibiotics:     Intravenous antibiotics:  Daptomycin 8mg / kg 1 day      3) Therapy Duration:  6 weeks     4) Outpatient Weekly Labs:     Order the following labs to be drawn on Mondays:   CBC  CMP   CRP  CPK     5) Fax Lab Results to Infectious Diseases Provider: Maria Dolores Clayton     [facility] Fax Number: [phone]     6) Ordering Information:     Orders Mode: Verbal with readback  Ordering Provider: Maria Dolores Clayton

## 2024-07-29 ENCOUNTER — TELEPHONE (OUTPATIENT)
Dept: INTERVENTIONAL RADIOLOGY/VASCULAR | Facility: CLINIC | Age: 54
End: 2024-07-29
Payer: MEDICARE

## 2024-07-30 ENCOUNTER — HOSPITAL ENCOUNTER (OUTPATIENT)
Dept: WOUND CARE | Facility: HOSPITAL | Age: 54
Discharge: HOME | End: 2024-07-30
Attending: INTERNAL MEDICINE
Payer: MEDICARE

## 2024-07-30 ENCOUNTER — TELEPHONE (OUTPATIENT)
Dept: INTERVENTIONAL RADIOLOGY/VASCULAR | Facility: CLINIC | Age: 54
End: 2024-07-30
Payer: MEDICARE

## 2024-07-30 VITALS
RESPIRATION RATE: 16 BRPM | TEMPERATURE: 98 F | SYSTOLIC BLOOD PRESSURE: 108 MMHG | HEART RATE: 113 BPM | DIASTOLIC BLOOD PRESSURE: 65 MMHG

## 2024-07-30 DIAGNOSIS — E11.621 DIABETIC ULCER OF LEFT MIDFOOT ASSOCIATED WITH TYPE 2 DIABETES MELLITUS, WITH BONE INVOLVEMENT WITHOUT EVIDENCE OF NECROSIS: Primary | ICD-10-CM

## 2024-07-30 DIAGNOSIS — L97.426 DIABETIC ULCER OF LEFT MIDFOOT ASSOCIATED WITH TYPE 2 DIABETES MELLITUS, WITH BONE INVOLVEMENT WITHOUT EVIDENCE OF NECROSIS: Primary | ICD-10-CM

## 2024-07-30 DIAGNOSIS — Z00.00 ENCOUNTER FOR MEDICARE ANNUAL WELLNESS EXAM: ICD-10-CM

## 2024-07-30 DIAGNOSIS — M86.10 ACUTE ON CHRONIC OSTEOMYELITIS: ICD-10-CM

## 2024-07-30 DIAGNOSIS — M86.00 ACUTE HEMATOGENOUS OSTEOMYELITIS, UNSPECIFIED SITE: Primary | ICD-10-CM

## 2024-07-30 DIAGNOSIS — M86.60 ACUTE ON CHRONIC OSTEOMYELITIS: ICD-10-CM

## 2024-07-30 DIAGNOSIS — L02.414 ABSCESS OF LEFT ARM: ICD-10-CM

## 2024-07-30 PROCEDURE — 99213 OFFICE O/P EST LOW 20 MIN: CPT

## 2024-07-30 NOTE — PROGRESS NOTES
[facility]  [address]  [address]  Nurse Visit    Subjective:       Patient seen in clinic today. Patient ambulated with assistance of knee scooter for LLE to [facility]. Prescribed CAM Boot on left Foot. Patient denies fever, chills, nausea, vomiting or diarrhea at present. Patient denies pain or discomfort to wound beds at present. Dressings appear intact, but LLE has strikethrough drainage noted to Left Lateral aspect of foot. Left Deltoid wound is measuring smaller overall, consulted with Dr. Ashanti MD regarding dressing change - will keep as previously applied today for upcoming visit on Friday. Dressings removed & wounds cleaned by nurse. Patient declined AVS, has MyChart.  Dressing changed as ordered, consulted with Dr. Kristen DPM regarding maceration and she gave verbal change to order for Betadine soaked gauze to periwound instead of Gentian Violet. Patient reports PICC line for IV ABX is scheduled for tomorrow for placement.       Assessment:          (Retired) Wound 04/08/22 1137 Diabetic Ulcer Left plantar;lateral Foot (Active)   04/08/22 1137    Pre-existing: Yes   Primary Wound Type: Diabetic c   Side: Left   Orientation: plantar;lateral   Location: Foot   Wound Number:    Ankle-Brachial Index:    Pulses:    Removal Indication and Assessment:    Wound Outcome:    (Retired) Wound Type:    (Retired) Wound Length (cm):    (Retired) Wound Width (cm):    (Retired) Depth (cm):    Wound Description (Comments):    Removal Indications:    Wound Image   07/30/24 1600   Wound WDL ex 07/30/24 1600   Dressing Appearance Intact;Moist drainage;Area marked 07/30/24 1600   Drainage Amount Large 07/30/24 1600   Drainage Characteristics/Odor Serosanguineous;No odor 07/30/24 1600   Appearance Pink;Yellow;Tendon;Moist 07/30/24 1600   Tissue loss description Full thickness 07/30/24 1600   Black (%), Wound Tissue Color 0 % 07/30/24 1600   Red (%), Wound Tissue Color 90 % 07/30/24 1600    Yellow (%), Wound Tissue Color 10 % 07/30/24 1600   Periwound Area Pale white;Moist;Macerated 07/30/24 1600   Wound Edges Defined;Open 07/30/24 1600   Wound Length (cm) 6.8 cm 07/30/24 1600   Wound Width (cm) 3.4 cm 07/30/24 1600   Wound Depth (cm) 0.4 cm 07/30/24 1600   Wound Volume (cm^3) 9.248 cm^3 07/30/24 1600   Wound Surface Area (cm^2) 23.12 cm^2 07/30/24 1600   Tunneling (depth (cm)/location) 0 07/30/24 1600   Undermining (depth (cm)/location) 0 07/30/24 1600   Care Cleansed with:;Antimicrobial agent;Sterile normal saline;Wound cleanser 07/30/24 1600   Dressing Changed 07/30/24 1600   Periwound Care Moisture barrier applied;Absorptive dressing applied 07/30/24 1600   Compression Tubular elasticized bandage 07/30/24 1600   Off Loading Football dressing;Off loading shoe 07/30/24 1600   Dressing Change Due 08/02/24 07/30/24 1600            Wound 06/25/24 Abscess Left upper Arm (Active)   06/25/24    Present on Original Admission: Y   Primary Wound Type: Abscess   Side: Left   Orientation: upper   Location: Arm   Wound Approximate Age at First Assessment (Weeks):    Wound Number:    Is this injury device related?:    Incision Type:    Closure Method:    Wound Description (Comments):    Type:    Additional Comments:    Ankle-Brachial Index:    Pulses:    Removal Indication and Assessment:    Wound Outcome:    Dressing Appearance Intact;Moist drainage 07/30/24 1600   Drainage Amount Moderate 07/30/24 1600   Drainage Characteristics/Odor Serosanguineous;No odor 07/30/24 1600   Appearance Pink;Yellow;Moist 07/30/24 1600   Tissue loss description Full thickness 07/30/24 1600   Black (%), Wound Tissue Color 0 % 07/30/24 1600   Red (%), Wound Tissue Color 95 % 07/30/24 1600   Yellow (%), Wound Tissue Color 5 % 07/30/24 1600   Periwound Area Dry 07/30/24 1600   Wound Edges Defined;Open 07/30/24 1600   Wound Length (cm) 0.5 cm 07/30/24 1600   Wound Width (cm) 0.5 cm 07/30/24 1600   Wound Depth (cm) 1.2 cm 07/30/24 1600  "  Wound Volume (cm^3) 0.3 cm^3 07/30/24 1600   Wound Surface Area (cm^2) 0.25 cm^2 07/30/24 1600   Tunneling (depth (cm)/location) 0 07/30/24 1600   Undermining (depth (cm)/location) 1.4 cm circumference 07/30/24 1600   Care Cleansed with:;Antimicrobial agent;Sterile normal saline 07/30/24 1600   Dressing Changed 07/30/24 1600   Packing packing removed;packed with;other (see comment) 07/30/24 1600   Packing Inserted  1 07/30/24 1600   Packing Removed 1 07/30/24 1600   Periwound Care Skin barrier film applied;Absorptive dressing applied 07/30/24 1600   Dressing Change Due 08/02/24 07/30/24 1600           Plan:   Wound Dressing Orders       Dressing change frequency twice a week (prn nurse visit)   Remove old dressing   Cleanse or irrigate with: Hibiclens and Normal Saline, Vashe 5 minute soak     Moisturize dry skin on leg with Enlivaderm     Protect periwound with:  Betadine Iodine soaked gauze (with large hole cut to allow for drainage) to periwound (3 layers)   Primary dressing: WOUND BASE: Iodosorb and Endoform (confetti mix) to wound bed   Secondary dressing:  Mextra 5" x 9" laid vertically.  Abram foam long x 2 (laid vertically)   Compression/Off-load: Open-Toe Football (cast padding x 2 rolls). Tubigrip, size D, double layer (calf size 34 cm) CAM Boot on the affected foot     MD verbal order for Left Deltoid:   Clean wound with normal saline, then flush with 10 mL of Vashe. Cavilon to periwound. Apply [place] to wound bed then pack with Hydrofera Blue rope with 1 inch tail wicking to Mepilex border dressing.         Follow up in about 3 days (around 8/2/2024) for Wound Care.          "

## 2024-07-31 ENCOUNTER — TELEPHONE (OUTPATIENT)
Dept: ADMINISTRATIVE | Facility: HOSPITAL | Age: 54
End: 2024-07-31
Payer: MEDICARE

## 2024-07-31 ENCOUNTER — HOSPITAL ENCOUNTER (OUTPATIENT)
Dept: INTERVENTIONAL RADIOLOGY/VASCULAR | Facility: HOSPITAL | Age: 54
Discharge: HOME | End: 2024-07-31
Payer: MEDICARE

## 2024-07-31 ENCOUNTER — TELEPHONE (OUTPATIENT)
Dept: INFECTIOUS DISEASES | Facility: CLINIC | Age: 54
End: 2024-07-31
Payer: MEDICARE

## 2024-07-31 VITALS
SYSTOLIC BLOOD PRESSURE: 142 MMHG | HEART RATE: 102 BPM | RESPIRATION RATE: 20 BRPM | DIASTOLIC BLOOD PRESSURE: 90 MMHG | OXYGEN SATURATION: 99 %

## 2024-07-31 DIAGNOSIS — M86.672 CHRONIC OSTEOMYELITIS OF LEFT FOOT: Primary | ICD-10-CM

## 2024-07-31 DIAGNOSIS — M86.00 ACUTE HEMATOGENOUS OSTEOMYELITIS, UNSPECIFIED SITE: ICD-10-CM

## 2024-07-31 PROCEDURE — 25000003 PHARM REV CODE 250: Performed by: STUDENT IN AN ORGANIZED HEALTH CARE EDUCATION/TRAINING PROGRAM

## 2024-07-31 PROCEDURE — C1751 CATH, INF, PER/CENT/MIDLINE: HCPCS

## 2024-07-31 PROCEDURE — 36573 INSJ PICC RS&I 5 YR+: CPT | Mod: RT | Performed by: STUDENT IN AN ORGANIZED HEALTH CARE EDUCATION/TRAINING PROGRAM

## 2024-07-31 PROCEDURE — C1894 INTRO/SHEATH, NON-LASER: HCPCS

## 2024-07-31 PROCEDURE — C1725 CATH, TRANSLUMIN NON-LASER: HCPCS

## 2024-07-31 RX ORDER — HEPARIN 100 UNIT/ML
500 SYRINGE INTRAVENOUS
OUTPATIENT
Start: 2024-07-31

## 2024-07-31 RX ORDER — SODIUM CHLORIDE 0.9 % (FLUSH) 0.9 %
10 SYRINGE (ML) INJECTION
OUTPATIENT
Start: 2024-07-31

## 2024-07-31 RX ORDER — LIDOCAINE HYDROCHLORIDE 10 MG/ML
INJECTION INFILTRATION; PERINEURAL
Status: COMPLETED | OUTPATIENT
Start: 2024-07-31 | End: 2024-07-31

## 2024-07-31 RX ADMIN — LIDOCAINE HYDROCHLORIDE 1.5 ML: 10 INJECTION, SOLUTION INFILTRATION; PERINEURAL at 08:07

## 2024-07-31 NOTE — BRIEF OP NOTE
Radiology Post-Procedure Note    Pre Op Diagnosis: foot wound    Post Op Diagnosis: same    Procedure: PICC placement, venogram and venoplasty     Procedure performed by: Any Wilkes MD    Written Informed Consent Obtained: Yes    Specimen Removed: NO    Estimated Blood Loss: less than 50     Findings:   PICC placement, venogram and venoplasty in right upper extremity.    Patient tolerated procedure well.    Any Wilkes MD  Interventional Radiology

## 2024-07-31 NOTE — DISCHARGE INSTRUCTIONS
Please call with any questions or concerns.      Monday thru Friday 8:00 am - 4:30 pm    Interventional Radiology   [phone]    After Hours    Ask for the Radiology Resident on call  [phone]    Take it easy for next couple of days. Monitor site for any redness or excessive drainage. Avoid using limb where PICC was placed for 24-48 hr (no lifting > 10 lb)

## 2024-07-31 NOTE — TELEPHONE ENCOUNTER
Patient was scheduled today for line placement at IR. He requested to get IVABX from our [facility] pharmacy location which he states he has done in the past. Notified MD and ID team that he agreed to IVABX Daptomycin daily IVP. [facility] protocol reviewed and agrees he feels comfortable and agrees to come on service. Message sent to ID to get him set up with weekly suite visits for labs and dressing changes.     [facility]  Amaya Dalton RN, BSN, Clinical Liaison  Office [phone]  Cell [phone]

## 2024-07-31 NOTE — TELEPHONE ENCOUNTER
Underwent placement of PICC line by IR today. Plan for daptomycin 8mg/kg x 6 wks for treatment of L foot osteo (EOT 9/11/24). I have ordered weekly labs and dressing changes at our infusion suite.

## 2024-07-31 NOTE — H&P
Interventional Radiology Pre-Procedure History & Physical      Chief Complaint/Reason for Referral: infection    History of Present Illness:  Indio Ryder Jr. is a 54 y.o. 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    Right foot    Colon adenocarcinoma 04/12/2022    Diabetic ketoacidosis without coma associated with type 2 diabetes mellitus 05/30/2017    Diabetic ulcer of right foot associated with type 2 diabetes mellitus 06/03/2015    Disorder of kidney and ureter     Essential hypertension 06/06/2013    Group B streptococcal infection 01/13/2017    Mixed hyperlipidemia 08/12/2014    Septic arthritis of left foot 04/28/2021    Shoulder impingement 08/12/2014    Subacute osteomyelitis of right foot 01/12/2017    Streptococcus agalactiae, Actinomyces odontolyticus    Traumatic amputation of fifth toe of right foot 07/02/2015    Type 2 diabetes mellitus with diabetic neuropathy, with long-term current use of insulin 05/03/2016     Past Surgical History:   Procedure Laterality Date    COLONOSCOPY Right 1/19/2022    Procedure: COLONOSCOPY;  Surgeon: Tj Haile MD;  Location: [facility] (4TH FLR);  Service: Endoscopy;  Laterality: Right;  previous order un-usable/poor prep 1/18/22  RAPID COVID test arrival 12:20  instructions handed to pt-     COLONOSCOPY N/A 3/21/2022    Procedure: COLONOSCOPY;  Surgeon: Sheng Haque MD;  Location: [facility] (2ND FLR);  Service: Endoscopy;  Laterality: N/A;  Colonoscopy with AES for hepatix flexure polyp removal, 2nd floor  Pt is fully vaccinated-DS  Pt prescribed Plavix by Dr. Stahl in Jan. 2022, however pt states that he never started taking it-DS  3/16/22-Instructions sent via portal-DS    COLONOSCOPY N/A 9/13/2023    Procedure: COLONOSCOPY;  Surgeon: Erik Stout MD;  Location: [facility] ENDO (4TH FLR);  Service: Colon and Rectal;  Laterality: N/A;  Referred by timur Saunders, [facility], portal -     DEBRIDEMENT OF FOOT Left 7/14/2019    Procedure: DEBRIDEMENT, FOOT, with left 5th ray amputation;  Surgeon: Sis Hickman DPM;  Location: [facility] OR 2ND FLR;  Service: Podiatry;  Laterality: Left;    DEBRIDEMENT OF FOOT Left 7/17/2019    Procedure: DEBRIDEMENT, FOOT with leftt 5th ray partial amputation with Neox Graft;  Surgeon: Mai Burrell DPM;  Location: [facility] OR 2ND FLR;  Service: Podiatry;  Laterality: Left;  t    DEBRIDEMENT OF FOOT Bilateral 4/29/2021    Procedure: DEBRIDEMENT, FOOT;  Surgeon: Mai Burrell DPM;  Location: [facility] OR 1ST FLR;  Service: Podiatry;  Laterality: Bilateral;  Graft application    DEBRIDEMENT OF FOOT Left 7/31/2023    Procedure: DEBRIDEMENT, FOOT;  Surgeon: Marivel Peterson DPM;  Location: [facility] OR 2ND FLR;  Service: Podiatry;  Laterality: Left;    TOE AMPUTATION Right 06/05/2015    TOE AMPUTATION Right 01/19/2017    XI ROBOTIC COLECTOMY, RIGHT N/A 4/25/2022    Procedure: XI ROBOTIC COLECTOMY, RIGHT (lithotomy, eras low);  Surgeon: Erik Stout MD;  Location: [facility] OR 2ND FLR;  Service: Colon and Rectal;  Laterality: N/A;  Paruch to [place]    XI ROBOTIC COLECTOMY, RIGHT  4/25/2022    Procedure: ;  Surgeon: Erik Stout MD;  Location: [facility] OR 2ND FLR;  Service: Colon and Rectal;;       Allergies:   Review of patient's allergies indicates:  No Known Allergies     Home Meds:   Prior to Admission medications    Medication Sig Start Date End Date Taking? Authorizing Provider   amoxicillin-clavulanate 875-125mg (AUGMENTIN) 875-125 mg per tablet Take 1 tablet by mouth 2 (two) times a day. 9/11/23   Damien Richey Jr., PA   atorvastatin (LIPITOR) 80 MG tablet Take 1 tablet (80 mg total) by mouth once daily. 10/30/23 10/29/24  Lorena Thakur MD   empagliflozin (JARDIANCE) 25 mg tablet Take 1 tablet (25 mg total) by mouth once daily. 10/24/23   Gigi Clay, APRN, FNP   insulin aspart U-100 (NOVOLOG) 100 unit/mL (3 mL) InPn pen Inject if sugar is  "> 180 up to 4x a day , 180-230+2, 231-280+4, 281-330+6, 331-380+8, >380+10. Max daily 40 units 6/25/24   Rosa Elena Vasques, JR   insulin glargine U-300 conc (TOUJEO MAX U-300 SOLOSTAR) 300 unit/mL (3 mL) insulin pen Inject 26 to 32 units under the skin at night 5/20/24   Gigi Clay APRN, RONNY   ONETOUCH DELICA LANCETS 33 gauge Misc  5/4/17   Provider, Historical   ONETOUCH ULTRA2 kit  3/29/17   Provider, Historical   pen needle, diabetic (BD SASCHA 2ND GEN PEN NEEDLE) 32 gauge x 5/32" Ndle Use 4 times a day 8/26/21   Gigi Clay APRN, RONNY       Anticoagulation/Antiplatelet Meds: no anticoagulation    Review of Systems:   Hematological: no known coagulopathies  Respiratory: no shortness of breath  Cardiovascular: no chest pain  Gastrointestinal: no abdominal pain  Genitourinary: no dysuria  Musculoskeletal: negative  Neurological: no TIA or stroke symptoms     Physical Exam:  Pulse: 100 (07/31/24 0816)  Resp: 19 (07/31/24 0816)  BP: (!) 140/90 (07/31/24 0816)  SpO2: 100 % (07/31/24 0816)    General: WNWD, NAD  HEENT: Normocephalic, sclera anicteric  Neck: Supple  Heart: RRR  Lungs:  breathing unlabored  Abd: NTND, soft  Extremities:  no CCE on exposed skin  Neuro: Gross nonfocal    Laboratory:  Lab Results   Component Value Date    INR 1.0 07/24/2023       Lab Results   Component Value Date    WBC 5.38 07/02/2024    HGB 12.5 (L) 07/02/2024    HCT 40.2 07/02/2024    MCV 90 07/02/2024     07/02/2024      Lab Results   Component Value Date     (H) 07/26/2024     (L) 07/26/2024    K 4.3 07/26/2024    CL 98 07/26/2024    CO2 27 07/26/2024    BUN 12 07/26/2024    CREATININE 1.4 07/26/2024    CALCIUM 9.5 07/26/2024    MG 1.9 04/26/2022    ALT 10 07/02/2024    AST 9 (L) 07/02/2024    ALBUMIN 3.0 (L) 07/02/2024    BILITOT 0.5 07/02/2024    BILIDIR 0.2 02/04/2006       Imaging:  Chest radiograph was reviewed.    Assessment/Plan:  54 y.o. male with infection. Will undergo picc  " today.    Sedation:  local    Risks (including, but not limited to, pain, bleeding, infection, damage to nearby structures, treatment failure/recurrence, and the need for additional procedures), potential benefits, and alternatives were discussed with the patient. All questions were answered to the best of my abilities. The patient wishes to proceed. Written informed consent was obtained.      Any Wilkes MD

## 2024-07-31 NOTE — PLAN OF CARE
Procedure complete. Pt tolerated well. Site CDI. Pt transferred back to [facility] and all discharge instructions reviewed. All questions answered with patient.

## 2024-07-31 NOTE — PLAN OF CARE
Pt arrived to IR rm 189 for PICC line placement. Pt oriented to unit and staff, Pt safely transferred from stretcher to procedural table. Fall risk reviewed and comfort measures utilized with interventions. Safety strap applied, position pillows to minimize pressure points. Blankets applied. Pt prepped and draped utilizing standard sterile technique. Patient placed on continuous monitoring, as required by sedation policy. Timeouts implemented utilizing standard universal time-out per department and facility policy. RN to remain at bedside with continuous monitoring. Pt resting comfortably. Denies pain/discomfort. Will continue to monitor. See flow sheets for monitoring, medication administration, and updates. patient verbalizes understanding.

## 2024-08-02 ENCOUNTER — HOSPITAL ENCOUNTER (OUTPATIENT)
Dept: WOUND CARE | Facility: HOSPITAL | Age: 54
Discharge: HOME | End: 2024-08-02
Attending: PODIATRIST
Payer: MEDICARE

## 2024-08-02 VITALS
HEART RATE: 98 BPM | TEMPERATURE: 98 F | SYSTOLIC BLOOD PRESSURE: 142 MMHG | RESPIRATION RATE: 14 BRPM | DIASTOLIC BLOOD PRESSURE: 76 MMHG

## 2024-08-02 DIAGNOSIS — M86.10 ACUTE ON CHRONIC OSTEOMYELITIS: ICD-10-CM

## 2024-08-02 DIAGNOSIS — L97.426 DIABETIC ULCER OF LEFT MIDFOOT ASSOCIATED WITH TYPE 2 DIABETES MELLITUS, WITH BONE INVOLVEMENT WITHOUT EVIDENCE OF NECROSIS: Primary | ICD-10-CM

## 2024-08-02 DIAGNOSIS — L02.414 ABSCESS OF LEFT ARM: ICD-10-CM

## 2024-08-02 DIAGNOSIS — E11.621 DIABETIC ULCER OF LEFT MIDFOOT ASSOCIATED WITH TYPE 2 DIABETES MELLITUS, WITH BONE INVOLVEMENT WITHOUT EVIDENCE OF NECROSIS: Primary | ICD-10-CM

## 2024-08-02 DIAGNOSIS — M86.60 ACUTE ON CHRONIC OSTEOMYELITIS: ICD-10-CM

## 2024-08-02 PROCEDURE — 99499 UNLISTED E&M SERVICE: CPT | Mod: ,,, | Performed by: PODIATRIST

## 2024-08-02 PROCEDURE — 11045 DBRDMT SUBQ TISS EACH ADDL: CPT | Mod: ,,, | Performed by: PODIATRIST

## 2024-08-02 PROCEDURE — 11042 DBRDMT SUBQ TIS 1ST 20SQCM/<: CPT | Performed by: PODIATRIST

## 2024-08-02 PROCEDURE — 11042 DBRDMT SUBQ TIS 1ST 20SQCM/<: CPT | Mod: ,,, | Performed by: PODIATRIST

## 2024-08-02 NOTE — PROGRESS NOTES
[facility]                Progress Note    Subjective:       Patient ID: Indio Ryder Jr. is a 54 y.o. male.    Chief Complaint: Wound Check, Dressing Change, and Diabetic Foot Ulcer    Follow Up wound care visit. Patient ambulated with assistance of knee scooter for LLE to [facility]. Prescribed CAM Boot on left Foot. Patient denies fever, chills, nausea, vomiting or diarrhea at present. Patient denies pain or discomfort to wound beds at present. Patient reports not having any issues with Tubigrip since last visit. Tubigrip is well-placed. Patient reports PICC line placement in [facility] was successful. He has begun therapy: daptomycin 8mg/kg/day per ID instructions with self infusions. Dressings appear intact without strikethrough drainage. Dressings removed & wounds cleaned by nurse. Left Deltoid wound appears to be healing well, changed dressing as per verbal order from MD last Tuesday. Left Plantar Foot wound is measuring smaller in depth, but slightly larger in length and width. Maceration appears to be less than at nurse visit.     Dressing applied per DPM and MD orders. Patient will return to [facility] on Tuesday for nurse visit and in 1 week to see DPM.  Patient declined AVS, has MyChart.         Review of Systems      Objective:        Physical Exam    Vitals:    08/02/24 0855   BP: (!) 142/76   Pulse: 98   Resp: 14   Temp: 97.5 °F (36.4 °C)       Assessment:           ICD-10-CM ICD-9-CM   1. Diabetic ulcer of left midfoot associated with type 2 diabetes mellitus, with bone involvement without evidence of necrosis  E11.621 250.80    L97.426 707.14   2. Acute on chronic osteomyelitis  M86.10 730.00    M86.60 730.10   3. Abscess of left arm  L02.414 682.3            (Retired) Wound 04/08/22 1137 Diabetic Ulcer Left plantar;lateral Foot (Active)   04/08/22 1137    Pre-existing: Yes   Primary Wound Type: Diabetic ulc   Side: Left   Orientation: plantar;lateral   Location: Foot    Wound Number:    Ankle-Brachial Index:    Pulses:    Removal Indication and Assessment:    Wound Outcome:    (Retired) Wound Type:    (Retired) Wound Length (cm):    (Retired) Wound Width (cm):    (Retired) Depth (cm):    Wound Description (Comments):    Removal Indications:    Wound Image   08/02/24 1005   Wound WDL ex 08/02/24 1005   Dressing Appearance Intact;Moist drainage 08/02/24 1005   Drainage Amount Moderate 08/02/24 1005   Drainage Characteristics/Odor Serosanguineous;Green;No odor 08/02/24 1005   Appearance Pink;Red;Tendon;Moist 08/02/24 1005   Tissue loss description Full thickness 08/02/24 1005   Black (%), Wound Tissue Color 0 % 08/02/24 1005   Red (%), Wound Tissue Color 95 % 08/02/24 1005   Yellow (%), Wound Tissue Color 5 % 08/02/24 1005   Periwound Area Pale white;Moist;Macerated 08/02/24 1005   Wound Edges Defined;Open 08/02/24 1005   Wound Length (cm) 7 cm 08/02/24 1005   Wound Width (cm) 3.6 cm 08/02/24 1005   Wound Depth (cm) 0.3 cm 08/02/24 1005   Wound Volume (cm^3) 7.56 cm^3 08/02/24 1005   Wound Surface Area (cm^2) 25.2 cm^2 08/02/24 1005   Tunneling (depth (cm)/location) 0 08/02/24 1005   Undermining (depth (cm)/location) 0 08/02/24 1005   Care Cleansed with:;Antimicrobial agent;Sterile normal saline;Wound cleanser;Debrided 08/02/24 1005   Dressing Changed 08/02/24 1005   Periwound Care Moisture barrier applied;Absorptive dressing applied 08/02/24 1005   Compression Tubular elasticized bandage 08/02/24 1005   Off Loading Football dressing;Off loading shoe 08/02/24 1005   Dressing Change Due 08/06/24 08/02/24 1005            Wound 06/25/24 Abscess Left upper Arm (Active)   06/25/24    Present on Original Admission: Y   Primary Wound Type: Abscess   Side: Left   Orientation: upper   Location: Arm   Wound Approximate Age at First Assessment (Weeks):    Wound Number:    Is this injury device related?:    Incision Type:    Closure Method:    Wound Description (Comments):    Type:     Additional Comments:    Ankle-Brachial Index:    Pulses:    Removal Indication and Assessment:    Wound Outcome:    Wound Image   08/02/24 1005   Dressing Appearance Intact;Moist drainage 08/02/24 1005   Drainage Amount Moderate 08/02/24 1005   Drainage Characteristics/Odor Serosanguineous;Creamy;No odor 08/02/24 1005   Appearance Pink;Yellow;Moist 08/02/24 1005   Tissue loss description Full thickness 08/02/24 1005   Black (%), Wound Tissue Color 0 % 08/02/24 1005   Red (%), Wound Tissue Color 98 % 08/02/24 1005   Yellow (%), Wound Tissue Color 2 % 08/02/24 1005   Periwound Area Dry;Intact 08/02/24 1005   Wound Edges Defined;Open 08/02/24 1005   Wound Length (cm) 0.4 cm 08/02/24 1005   Wound Width (cm) 0.4 cm 08/02/24 1005   Wound Depth (cm) 1 cm 08/02/24 1005   Wound Volume (cm^3) 0.16 cm^3 08/02/24 1005   Wound Surface Area (cm^2) 0.16 cm^2 08/02/24 1005   Tunneling (depth (cm)/location) 0 08/02/24 1005   Undermining (depth (cm)/location) 0 08/02/24 1005   Care Cleansed with:;Antimicrobial agent;Sterile normal saline;Wound cleanser 08/02/24 1005   Dressing Changed 08/02/24 1005   Periwound Care Skin barrier film applied;Absorptive dressing applied 08/02/24 1005   Dressing Change Due 08/06/24 08/02/24 1005           Plan:              Tissue pathology and/or culture taken     [] Yes      [x] No  Sharp debridement performed                   [x] Yes       [] No  Labs ordered     [] Yes       [x] No  Imaging ordered    [] Yes      [x] No    Orders Placed This Encounter   Procedures    Change dressing     Wound Dressing Orders       Dressing change frequency twice a week (prn nurse visit)   Remove old dressing   Cleanse with: Hibiclens and Normal Saline then Vinegar soak x 10 minutes     Moisturize dry skin on leg with Enlivaderm     Protect periwound with:  Betadine Iodine soaked gauze (with large hole cut to allow for drainage) to periwound (3 layers)   Primary dressing: WOUND BASE: Iodosorb and Endoform  "(confetti mix) to wound bed (Endoform covering Tendon)  Secondary dressing:  Mextra 5" x 9" laid vertically.  Abram foam long x 2 (laid vertically)   Compression/Off-load: Open-Toe Football (cast padding x 2 rolls). Tubigrip, size D, double layer (calf size 34 cm) CAM Boot on the affected foot      MD verbal order for Left Deltoid:   Clean wound with normal saline, then flush with 10 mL of Vashe. Cavilon to periwound. Apply [place] to wound bed then pack with Hydrofera Blue rope (cut in half vertically) with 1 inch tail wicking to Mepilex border dressing.        Follow up in about 4 days (around 8/6/2024) for Nurse Visit.       " "Type of Debridement:  Excisional       Length (cm):  7       Area (sq cm):  25.2       Width (cm):  3.6       Percent Debrided (%):  100       Depth (cm):  0.3       Total Area Debrided (sq cm):  25.2    Depth of debridement:  Subcutaneous tissue    Tissue debrided:  Dermis, Epidermis, Subcutaneous and Tendon    Devitalized tissue debrided:  Fibrin and Callus    Instruments:  Curette  Bleeding:  Minimal  Hemostasis Achieved: Yes  Method Used:  Pressure  Patient tolerance:  Patient tolerated the procedure well with no immediate complications        Patient will return to [facility] on Tuesday for nurse visit and in 1 week to see DPM.  Patient declined AVS, has MyChart.     Tissue pathology and/or culture taken     [] Yes      [x] No  Sharp debridement performed                   [x] Yes       [] No  Labs ordered     [] Yes       [x] No  Imaging ordered    [] Yes      [x] No    Orders Placed This Encounter   Procedures    Change dressing     Wound Dressing Orders       Dressing change frequency twice a week (prn nurse visit)   Remove old dressing   Cleanse with: Hibiclens and Normal Saline then Vinegar soak x 10 minutes     Moisturize dry skin on leg with Enlivaderm     Protect periwound with:  Betadine Iodine soaked gauze (with large hole cut to allow for drainage) to periwound (3 layers)   Primary dressing: WOUND BASE: Iodosorb and Endoform (confetti mix) to wound bed (Endoform covering Tendon)  Secondary dressing:  Mextra 5" x 9" laid vertically.  Abram foam long x 2 (laid vertically)   Compression/Off-load: Open-Toe Football (cast padding x 2 rolls). Tubigrip, size D, double layer (calf size 34 cm) CAM Boot on the affected foot      MD verbal order for Left Deltoid:   Clean wound with normal saline, then flush with 10 mL of Vashe. Cavilon to periwound. Apply [place] to wound bed then pack with Hydrofera Blue rope (cut in half vertically) with 1 inch tail wicking to Mepilex border dressing.        Follow up in about 4 " days (around 8/6/2024) for Nurse Visit.

## 2024-08-06 ENCOUNTER — HOSPITAL ENCOUNTER (OUTPATIENT)
Dept: WOUND CARE | Facility: HOSPITAL | Age: 54
Discharge: HOME | End: 2024-08-06
Attending: INTERNAL MEDICINE
Payer: MEDICARE

## 2024-08-06 VITALS
HEART RATE: 113 BPM | TEMPERATURE: 98 F | SYSTOLIC BLOOD PRESSURE: 84 MMHG | RESPIRATION RATE: 16 BRPM | DIASTOLIC BLOOD PRESSURE: 51 MMHG

## 2024-08-06 DIAGNOSIS — L97.426 DIABETIC ULCER OF LEFT MIDFOOT ASSOCIATED WITH TYPE 2 DIABETES MELLITUS, WITH BONE INVOLVEMENT WITHOUT EVIDENCE OF NECROSIS: Primary | ICD-10-CM

## 2024-08-06 DIAGNOSIS — M86.10 ACUTE ON CHRONIC OSTEOMYELITIS: ICD-10-CM

## 2024-08-06 DIAGNOSIS — E11.621 DIABETIC ULCER OF LEFT MIDFOOT ASSOCIATED WITH TYPE 2 DIABETES MELLITUS, WITH BONE INVOLVEMENT WITHOUT EVIDENCE OF NECROSIS: Primary | ICD-10-CM

## 2024-08-06 DIAGNOSIS — M86.60 ACUTE ON CHRONIC OSTEOMYELITIS: ICD-10-CM

## 2024-08-06 DIAGNOSIS — L02.414 ABSCESS OF LEFT ARM: ICD-10-CM

## 2024-08-06 PROCEDURE — 99213 OFFICE O/P EST LOW 20 MIN: CPT

## 2024-08-07 ENCOUNTER — INFUSION (OUTPATIENT)
Dept: INFECTIOUS DISEASES | Facility: HOSPITAL | Age: 54
End: 2024-08-07
Payer: MEDICARE

## 2024-08-07 VITALS
BODY MASS INDEX: 28.06 KG/M2 | DIASTOLIC BLOOD PRESSURE: 71 MMHG | WEIGHT: 211.75 LBS | RESPIRATION RATE: 19 BRPM | HEIGHT: 73 IN | SYSTOLIC BLOOD PRESSURE: 134 MMHG | TEMPERATURE: 98 F | HEART RATE: 89 BPM

## 2024-08-07 DIAGNOSIS — M86.10 ACUTE ON CHRONIC OSTEOMYELITIS: Primary | ICD-10-CM

## 2024-08-07 DIAGNOSIS — M86.672 CHRONIC OSTEOMYELITIS OF LEFT FOOT: ICD-10-CM

## 2024-08-07 DIAGNOSIS — M86.60 ACUTE ON CHRONIC OSTEOMYELITIS: Primary | ICD-10-CM

## 2024-08-07 LAB
ALBUMIN SERPL BCP-MCNC: 2.7 G/DL (ref 3.5–5.2)
ALP SERPL-CCNC: 206 U/L (ref 55–135)
ALT SERPL W/O P-5'-P-CCNC: 12 U/L (ref 10–44)
ANION GAP SERPL CALC-SCNC: 9 MMOL/L (ref 8–16)
AST SERPL-CCNC: 12 U/L (ref 10–40)
BASOPHILS # BLD AUTO: 0.03 K/UL (ref 0–0.2)
BASOPHILS NFR BLD: 0.6 % (ref 0–1.9)
BILIRUB SERPL-MCNC: 0.5 MG/DL (ref 0.1–1)
BUN SERPL-MCNC: 10 MG/DL (ref 6–20)
CALCIUM SERPL-MCNC: 9.4 MG/DL (ref 8.7–10.5)
CHLORIDE SERPL-SCNC: 103 MMOL/L (ref 95–110)
CK SERPL-CCNC: 70 U/L (ref 20–200)
CO2 SERPL-SCNC: 25 MMOL/L (ref 23–29)
CREAT SERPL-MCNC: 1 MG/DL (ref 0.5–1.4)
CRP SERPL-MCNC: 7.8 MG/L (ref 0–8.2)
DIFFERENTIAL METHOD BLD: ABNORMAL
EOSINOPHIL # BLD AUTO: 0.4 K/UL (ref 0–0.5)
EOSINOPHIL NFR BLD: 7.3 % (ref 0–8)
ERYTHROCYTE [DISTWIDTH] IN BLOOD BY AUTOMATED COUNT: 13.8 % (ref 11.5–14.5)
EST. GFR  (NO RACE VARIABLE): >60 ML/MIN/1.73 M^2
GLUCOSE SERPL-MCNC: 274 MG/DL (ref 70–110)
HCT VFR BLD AUTO: 35.6 % (ref 40–54)
HGB BLD-MCNC: 10.9 G/DL (ref 14–18)
IMM GRANULOCYTES # BLD AUTO: 0.01 K/UL (ref 0–0.04)
IMM GRANULOCYTES NFR BLD AUTO: 0.2 % (ref 0–0.5)
LYMPHOCYTES # BLD AUTO: 1.9 K/UL (ref 1–4.8)
LYMPHOCYTES NFR BLD: 37.9 % (ref 18–48)
MCH RBC QN AUTO: 29.1 PG (ref 27–31)
MCHC RBC AUTO-ENTMCNC: 30.6 G/DL (ref 32–36)
MCV RBC AUTO: 95 FL (ref 82–98)
MONOCYTES # BLD AUTO: 0.4 K/UL (ref 0.3–1)
MONOCYTES NFR BLD: 8.9 % (ref 4–15)
NEUTROPHILS # BLD AUTO: 2.2 K/UL (ref 1.8–7.7)
NEUTROPHILS NFR BLD: 45.1 % (ref 38–73)
NRBC BLD-RTO: 0 /100 WBC
PLATELET # BLD AUTO: 231 K/UL (ref 150–450)
PMV BLD AUTO: 11.3 FL (ref 9.2–12.9)
POTASSIUM SERPL-SCNC: 3.4 MMOL/L (ref 3.5–5.1)
PROT SERPL-MCNC: 7.1 G/DL (ref 6–8.4)
RBC # BLD AUTO: 3.74 M/UL (ref 4.6–6.2)
SODIUM SERPL-SCNC: 137 MMOL/L (ref 136–145)
WBC # BLD AUTO: 4.93 K/UL (ref 3.9–12.7)

## 2024-08-07 PROCEDURE — 80053 COMPREHEN METABOLIC PANEL: CPT | Performed by: INTERNAL MEDICINE

## 2024-08-07 PROCEDURE — 86140 C-REACTIVE PROTEIN: CPT | Performed by: INTERNAL MEDICINE

## 2024-08-07 PROCEDURE — 85025 COMPLETE CBC W/AUTO DIFF WBC: CPT | Performed by: INTERNAL MEDICINE

## 2024-08-07 PROCEDURE — 96376 TX/PRO/DX INJ SAME DRUG ADON: CPT

## 2024-08-07 PROCEDURE — 96374 THER/PROPH/DIAG INJ IV PUSH: CPT

## 2024-08-07 PROCEDURE — 63600175 PHARM REV CODE 636 W HCPCS: Mod: JZ,JG | Performed by: INTERNAL MEDICINE

## 2024-08-07 PROCEDURE — 82550 ASSAY OF CK (CPK): CPT | Performed by: INTERNAL MEDICINE

## 2024-08-07 RX ORDER — SODIUM CHLORIDE 0.9 % (FLUSH) 0.9 %
10 SYRINGE (ML) INJECTION
OUTPATIENT
Start: 2024-08-14

## 2024-08-07 RX ORDER — HEPARIN 100 UNIT/ML
500 SYRINGE INTRAVENOUS
OUTPATIENT
Start: 2024-08-14

## 2024-08-07 RX ORDER — HEPARIN 100 UNIT/ML
500 SYRINGE INTRAVENOUS
Status: COMPLETED | OUTPATIENT
Start: 2024-08-07 | End: 2024-08-07

## 2024-08-07 RX ADMIN — ALTEPLASE 2 MG: 2.2 INJECTION, POWDER, LYOPHILIZED, FOR SOLUTION INTRAVENOUS at 12:08

## 2024-08-07 RX ADMIN — HEPARIN 500 UNITS: 100 SYRINGE at 12:08

## 2024-08-08 ENCOUNTER — HOSPITAL ENCOUNTER (OUTPATIENT)
Dept: RADIOLOGY | Facility: HOSPITAL | Age: 54
Discharge: HOME | End: 2024-08-08
Attending: PODIATRIST
Payer: MEDICARE

## 2024-08-08 ENCOUNTER — OFFICE VISIT (OUTPATIENT)
Dept: PODIATRY | Facility: CLINIC | Age: 54
End: 2024-08-08
Payer: MEDICARE

## 2024-08-08 VITALS
HEART RATE: 92 BPM | SYSTOLIC BLOOD PRESSURE: 139 MMHG | DIASTOLIC BLOOD PRESSURE: 81 MMHG | HEIGHT: 73 IN | WEIGHT: 211.88 LBS | BODY MASS INDEX: 28.08 KG/M2

## 2024-08-08 DIAGNOSIS — E11.42 POORLY CONTROLLED TYPE 2 DIABETES MELLITUS WITH PERIPHERAL NEUROPATHY: ICD-10-CM

## 2024-08-08 DIAGNOSIS — E08.621 DIABETIC ULCER OF LEFT MIDFOOT ASSOCIATED WITH DIABETES MELLITUS DUE TO UNDERLYING CONDITION, WITH BONE INVOLVEMENT WITHOUT EVIDENCE OF NECROSIS: ICD-10-CM

## 2024-08-08 DIAGNOSIS — M86.672 CHRONIC OSTEOMYELITIS OF LEFT FOOT: Primary | ICD-10-CM

## 2024-08-08 DIAGNOSIS — L97.426 DIABETIC ULCER OF LEFT MIDFOOT ASSOCIATED WITH DIABETES MELLITUS DUE TO UNDERLYING CONDITION, WITH BONE INVOLVEMENT WITHOUT EVIDENCE OF NECROSIS: ICD-10-CM

## 2024-08-08 DIAGNOSIS — I73.9 PVD (PERIPHERAL VASCULAR DISEASE): ICD-10-CM

## 2024-08-08 DIAGNOSIS — M86.672 CHRONIC OSTEOMYELITIS OF LEFT FOOT: ICD-10-CM

## 2024-08-08 DIAGNOSIS — E11.65 POORLY CONTROLLED TYPE 2 DIABETES MELLITUS WITH PERIPHERAL NEUROPATHY: ICD-10-CM

## 2024-08-08 PROCEDURE — 99999 PR PBB SHADOW E&M-EST. PATIENT-LVL IV: CPT | Mod: PBBFAC,,, | Performed by: PODIATRIST

## 2024-08-08 PROCEDURE — 73610 X-RAY EXAM OF ANKLE: CPT | Mod: TC,PN,LT

## 2024-08-08 PROCEDURE — 73630 X-RAY EXAM OF FOOT: CPT | Mod: TC,PN,LT

## 2024-08-08 PROCEDURE — 99214 OFFICE O/P EST MOD 30 MIN: CPT | Mod: PBBFAC,25,PN | Performed by: PODIATRIST

## 2024-08-09 ENCOUNTER — HOSPITAL ENCOUNTER (OUTPATIENT)
Dept: WOUND CARE | Facility: HOSPITAL | Age: 54
Discharge: HOME | End: 2024-08-09
Attending: PODIATRIST
Payer: MEDICARE

## 2024-08-09 VITALS
TEMPERATURE: 98 F | DIASTOLIC BLOOD PRESSURE: 70 MMHG | HEART RATE: 72 BPM | RESPIRATION RATE: 14 BRPM | SYSTOLIC BLOOD PRESSURE: 145 MMHG

## 2024-08-09 DIAGNOSIS — M86.60 ACUTE ON CHRONIC OSTEOMYELITIS: ICD-10-CM

## 2024-08-09 DIAGNOSIS — M86.10 ACUTE ON CHRONIC OSTEOMYELITIS: ICD-10-CM

## 2024-08-09 DIAGNOSIS — L02.414 ABSCESS OF LEFT ARM: ICD-10-CM

## 2024-08-09 DIAGNOSIS — E11.621 DIABETIC ULCER OF LEFT MIDFOOT ASSOCIATED WITH TYPE 2 DIABETES MELLITUS, WITH BONE INVOLVEMENT WITHOUT EVIDENCE OF NECROSIS: Primary | ICD-10-CM

## 2024-08-09 DIAGNOSIS — L97.426 DIABETIC ULCER OF LEFT MIDFOOT ASSOCIATED WITH TYPE 2 DIABETES MELLITUS, WITH BONE INVOLVEMENT WITHOUT EVIDENCE OF NECROSIS: Primary | ICD-10-CM

## 2024-08-09 PROCEDURE — 11042 DBRDMT SUBQ TIS 1ST 20SQCM/<: CPT | Performed by: PODIATRIST

## 2024-08-09 PROCEDURE — 11042 DBRDMT SUBQ TIS 1ST 20SQCM/<: CPT | Mod: ,,, | Performed by: PODIATRIST

## 2024-08-09 PROCEDURE — 99499 UNLISTED E&M SERVICE: CPT | Mod: ,,, | Performed by: PODIATRIST

## 2024-08-09 PROCEDURE — 11045 DBRDMT SUBQ TISS EACH ADDL: CPT | Mod: ,,, | Performed by: PODIATRIST

## 2024-08-09 NOTE — PROGRESS NOTES
"[facility]                Progress Note    Subjective:       Patient ID: Indio Ryder Jr. is a 54 y.o. male.    Chief Complaint: Wound Care, Diabetic Foot Ulcer, and Dressing Change    Follow Up wound care visit. Patient ambulated with assistance of knee scooter for LLE to [facility]. Prescribed CAM Boot on left Foot. Patient denies fever, chills, nausea, vomiting or diarrhea at present. Patient denies pain or discomfort to wound beds at present. Patient reports not having any issues with Tubigrip since last visit. Tubigrip is well-placed, no strikethrough drainage noted. Patient was seen by Dr. Reyes yesterday to discuss surgical options, (removed Hydrofera Blue transfer, Mextra, cast padding, Coban and Tubigrip from LLE). Patient reports PICC line infusion of daptomycin has "been going fine". Dressings appear intact without strikethrough drainage. Left Deltoid wound continues to show signs of healing, changed dressing as per verbal order from MD. Patient will return to [facility] on Tuesday for nurse visit and then next Friday.         Review of Systems      Objective:        Physical Exam    Vitals:    08/09/24 0845   BP: (!) 145/70   Pulse: 72   Resp: 14   Temp: 97.7 °F (36.5 °C)       Assessment:           ICD-10-CM ICD-9-CM   1. Diabetic ulcer of left midfoot associated with type 2 diabetes mellitus, with bone involvement without evidence of necrosis  E11.621 250.80    L97.426 707.14   2. Acute on chronic osteomyelitis  M86.10 730.00    M86.60 730.10   3. Abscess of left arm  L02.414 682.3            (Retired) Wound 04/08/22 1137 Diabetic Ulcer Left plantar;lateral Foot (Active)   04/08/22 1137    Pre-existing: Yes   Primary Wound Type: Diabetic ulc   Side: Left   Orientation: plantar;lateral   Location: Foot   Wound Number:    Ankle-Brachial Index:    Pulses:    Removal Indication and Assessment:    Wound Outcome:    (Retired) Wound Type:    (Retired) Wound Length (cm):  "   (Retired) Wound Width (cm):    (Retired) Depth (cm):    Wound Description (Comments):    Removal Indications:    Wound Image   08/09/24 0900   Wound WDL ex 08/09/24 0900   Dressing Appearance Moist drainage 08/09/24 0900   Drainage Amount Moderate 08/09/24 0900   Drainage Characteristics/Odor Serosanguineous;No odor 08/09/24 0900   Appearance Pink;Moist;Tendon 08/09/24 0900   Tissue loss description Full thickness 08/09/24 0900   Black (%), Wound Tissue Color 0 % 08/09/24 0900   Red (%), Wound Tissue Color 96 % 08/09/24 0900   Yellow (%), Wound Tissue Color 4 % 08/09/24 0900   Periwound Area Pale white;Moist;Macerated 08/09/24 0900   Wound Edges Defined;Open 08/09/24 0900   Wound Length (cm) 6.8 cm 08/09/24 0900   Wound Width (cm) 3.4 cm 08/09/24 0900   Wound Depth (cm) 0.3 cm 08/09/24 0900   Wound Volume (cm^3) 6.936 cm^3 08/09/24 0900   Wound Surface Area (cm^2) 23.12 cm^2 08/09/24 0900   Tunneling (depth (cm)/location) 0 08/09/24 0900   Undermining (depth (cm)/location) 0 08/09/24 0900   Care Cleansed with:;Antimicrobial agent;Sterile normal saline;Debrided;Wound cleanser 08/09/24 0900   Dressing Changed 08/09/24 0900   Periwound Care Moisture barrier applied;Absorptive dressing applied 08/09/24 0900   Compression Tubular elasticized bandage 08/09/24 0900   Off Loading Football dressing;Off loading shoe 08/09/24 0900   Dressing Change Due 08/13/24 08/09/24 0900            Wound 06/25/24 Abscess Left upper Arm (Active)   06/25/24    Present on Original Admission: Y   Primary Wound Type: Abscess   Side: Left   Orientation: upper   Location: Arm   Wound Approximate Age at First Assessment (Weeks):    Wound Number:    Is this injury device related?:    Incision Type:    Closure Method:    Wound Description (Comments):    Type:    Additional Comments:    Ankle-Brachial Index:    Pulses:    Removal Indication and Assessment:    Wound Outcome:    Wound Image   08/09/24 0900           Plan:              Tissue  "pathology and/or culture taken     [] Yes      [x] No  Sharp debridement performed                   [x] Yes       [] No  Labs ordered     [] Yes       [x] No  Imaging ordered    [] Yes      [x] No         Wound Dressing Orders      Dressing change frequency twice a week (prn nurse visit)  Remove old dressing  Cleanse with: Hibiclens and Normal Saline then Vinegar soak x 10 minutes    Moisturize dry skin on leg with Enlivaderm    Protect periwound with:  Betadine Iodine soaked gauze (with large hole cut to allow for drainage) to periwound (3 layers)  Primary dressing: WOUND BASE: Iodosorb and Endoform (confetti mix) to wound bed (Endoform covering Tendon)  Secondary dressing:  Mextra 5" x 9" laid vertically.  Abram foam long x 2 (laid vertically)  Compression/Off-load: Open-Toe Football (cast padding x 2 rolls). Tubigrip, size D, double layer (calf size 34 cm) CAM Boot on the affected foot     MD verbal order for Left Deltoid:  Clean wound with normal saline, then flush with 10 mL of Vashe. Cavilon to periwound. Apply [place] to wound bed then pack with Hydrofera Blue rope (cut in half vertically) with 1 inch tail wicking to Mepilex border dressing.          Follow up in about 4 days (around 8/13/2024) for Nurse Visit.         " 08/09/24 0900           Plan:              Education about the prevention of limb loss.    Discussed wound healing cycle, skin integrity, ways to care for skin.Counseled patient on the effects of biomechanical pressure,  PVD, and blood glucose on healing. He verbalizes understanding that it can increase the chances of delayed healing and this prolonged exposure leads to infection or progression of infection which subsequently can result in loss of limb.    Dressings removed & wounds cleaned by nurse.     Left Plantar Foot wound is measuring smaller in depth, but slightly larger in length and width. Maceration appears to be less than at nurse visit.       Debridement    Date/Time: 8/9/2024 8:40 AM    Performed by: Marivel Peterson DPM  Authorized by: Marivel Peterson DPM      Wound Details:    Location:  Left foot    Location:  Left Plantar    Type of Debridement:  Excisional       Length (cm):  6.8       Area (sq cm):  23.12       Width (cm):  3.4       Percent Debrided (%):  100       Depth (cm):  0.3       Total Area Debrided (sq cm):  23.12    Depth of debridement:  Subcutaneous tissue    Tissue debrided:  Dermis, Epidermis and Subcutaneous    Devitalized tissue debrided:  Callus and Fibrin    Instruments:  Curette  Bleeding:  Minimal  Hemostasis Achieved: Yes  Method Used:  Pressure  Patient tolerance:  Patient tolerated the procedure well with no immediate complications        Patient will return to [facility] on Tuesday for nurse visit and in 1 week to see CLEM.  Patient declined AVS, has MyChart.     Tissue pathology and/or culture taken     [] Yes      [x] No  Sharp debridement performed                   [x] Yes       [] No  Labs ordered     [] Yes       [x] No  Imaging ordered    [] Yes      [x] No    Orders Placed This Encounter   Procedures    Debridement     This order was created via procedure documentation     Standing Status:   Standing     Number of Occurrences:   1    Change dressing     Wound Dressing  "Orders      Dressing change frequency twice a week (prn nurse visit)  Remove old dressing  Cleanse with: Hibiclens and Normal Saline then Vashe soak x 10 minutes    Moisturize dry skin on leg with Enlivaderm    Protect periwound with:  Gentian Violet, areas of maceration and hugging wound bed  Primary dressing: WOUND BASE: Iodosorb and Endoform (confetti mix) to wound bed (Endoform covering Tendon)  Secondary dressing:  Kaltostat (2 layers folded) then Mextra 5" x 9" laid vertically.  Abram foam long x 2 (laid vertically)  Compression/Off-load: Open-Toe Football (cast padding x 2 rolls). Tubigrip, size D, double layer (calf size 34 cm) CAM Boot on the affected foot      MD verbal order for Left Deltoid:  Clean wound with normal saline, then flush with 10 mL of Vashe.   Cavilon to periwound.   Apply Endoform into wound bed. Cover with Mepilex border dressing.    Change at nurse visit        Follow up in about 4 days (around 8/13/2024) for Nurse Visit.       "

## 2024-08-13 ENCOUNTER — HOSPITAL ENCOUNTER (OUTPATIENT)
Dept: WOUND CARE | Facility: HOSPITAL | Age: 54
Discharge: HOME | End: 2024-08-13
Attending: PODIATRIST
Payer: MEDICARE

## 2024-08-13 VITALS
TEMPERATURE: 98 F | SYSTOLIC BLOOD PRESSURE: 118 MMHG | RESPIRATION RATE: 14 BRPM | DIASTOLIC BLOOD PRESSURE: 69 MMHG | HEART RATE: 101 BPM

## 2024-08-13 DIAGNOSIS — E11.621 DIABETIC ULCER OF LEFT MIDFOOT ASSOCIATED WITH TYPE 2 DIABETES MELLITUS, WITH BONE INVOLVEMENT WITHOUT EVIDENCE OF NECROSIS: Primary | ICD-10-CM

## 2024-08-13 DIAGNOSIS — M86.60 ACUTE ON CHRONIC OSTEOMYELITIS: ICD-10-CM

## 2024-08-13 DIAGNOSIS — L97.426 DIABETIC ULCER OF LEFT MIDFOOT ASSOCIATED WITH TYPE 2 DIABETES MELLITUS, WITH BONE INVOLVEMENT WITHOUT EVIDENCE OF NECROSIS: Primary | ICD-10-CM

## 2024-08-13 DIAGNOSIS — M86.10 ACUTE ON CHRONIC OSTEOMYELITIS: ICD-10-CM

## 2024-08-13 DIAGNOSIS — L02.414 ABSCESS OF LEFT ARM: ICD-10-CM

## 2024-08-13 PROCEDURE — 99213 OFFICE O/P EST LOW 20 MIN: CPT

## 2024-08-13 NOTE — PROGRESS NOTES
[facility]  [address]  [id number]  Nurse Visit    Subjective:       Patient seen in clinic today. Patient ambulated with assistance of knee scooter for LLE to [facility]. Prescribed CAM Boot on left Foot. Patient denies fever, chills, nausea, vomiting or diarrhea at present. Patient denies pain or discomfort to wound beds at present. He reports still doing well with RUE PICC line at home infusions daily of ABX therapy. Dressings appear intact without strikethrough drainage. Dressings removed & wounds cleaned by nurse. Consulted with on duty MD regarding Left Deltoid wound, will continue with current wound order. Patient declined AVS, has MyChart.  Dressings changed as ordered.      Assessment:          (Retired) Wound 04/08/22 1137 Diabetic Ulcer Left plantar;lateral Foot (Active)   04/08/22 1137    Pre-existing: Yes   Primary Wound Type: Diabetic ulc   Side: Left   Orientation: plantar;lateral   Location: Foot   Wound Number:    Ankle-Brachial Index:    Pulses:    Removal Indication and Assessment:    Wound Outcome:    (Retired) Wound Type:    (Retired) Wound Length (cm):    (Retired) Wound Width (cm):    (Retired) Depth (cm):    Wound Description (Comments):    Removal Indications:    Wound Image   08/13/24 1545   Wound WDL ex 08/13/24 1545   Dressing Appearance Intact;Moist drainage 08/13/24 1545   Drainage Amount Large 08/13/24 1545   Drainage Characteristics/Odor Serosanguineous;No odor 08/13/24 1545   Appearance Pink;Tendon;Moist 08/13/24 1545   Tissue loss description Full thickness 08/13/24 1545   Black (%), Wound Tissue Color 0 % 08/13/24 1545   Red (%), Wound Tissue Color 95 % 08/13/24 1545   Yellow (%), Wound Tissue Color 5 % 08/13/24 1545   Periwound Area Pale white;Macerated 08/13/24 1545   Wound Edges Defined;Open 08/13/24 1545   Care Cleansed with:;Antimicrobial agent;Sterile normal saline 08/13/24 1545   Dressing Changed 08/13/24 1545   Periwound Care Moisture  barrier applied;Absorptive dressing applied 08/13/24 1545   Compression Tubular elasticized bandage 08/13/24 1545   Off Loading Football dressing;Off loading shoe 08/13/24 1545   Dressing Change Due 08/16/24 08/13/24 1545            Wound 06/25/24 Abscess Left upper Arm (Active)   06/25/24    Present on Original Admission: Y   Primary Wound Type: Abscess   Side: Left   Orientation: upper   Location: Arm   Wound Approximate Age at First Assessment (Weeks):    Wound Number:    Is this injury device related?:    Incision Type:    Closure Method:    Wound Description (Comments):    Type:    Additional Comments:    Ankle-Brachial Index:    Pulses:    Removal Indication and Assessment:    Wound Outcome:    Wound Image   08/13/24 1545   Dressing Appearance Intact;Moist drainage 08/13/24 1545   Drainage Amount Moderate 08/13/24 1545   Drainage Characteristics/Odor Serosanguineous;No odor 08/13/24 1545   Appearance Red;Yellow;Moist 08/13/24 1545   Tissue loss description Full thickness 08/13/24 1545   Black (%), Wound Tissue Color 0 % 08/13/24 1545   Red (%), Wound Tissue Color 95 % 08/13/24 1545   Yellow (%), Wound Tissue Color 5 % 08/13/24 1545   Periwound Area Dry;Intact 08/13/24 1545   Wound Edges Defined;Open 08/13/24 1545   Wound Length (cm) 0.8 cm 08/13/24 1545   Wound Width (cm) 0.4 cm 08/13/24 1545   Wound Depth (cm) 0.8 cm 08/13/24 1545   Wound Volume (cm^3) 0.256 cm^3 08/13/24 1545   Wound Surface Area (cm^2) 0.32 cm^2 08/13/24 1545   Tunneling (depth (cm)/location) 0 08/13/24 1545   Undermining (depth (cm)/location) 0.5 cm circumference 08/13/24 1545   Care Cleansed with:;Antimicrobial agent;Sterile normal saline;Wound cleanser 08/13/24 1545   Dressing Changed 08/13/24 1545   Periwound Care Skin barrier film applied;Absorptive dressing applied 08/13/24 1545   Dressing Change Due 08/16/24 08/13/24 1545           Plan:     Wound Dressing Orders       Dressing change frequency twice a week (prn nurse visit)   Remove  "old dressing   Cleanse with: Hibiclens and Normal Saline then Vashe soak x 10 minutes     Moisturize dry skin on leg with Enlivaderm     Protect periwound with:  Gentian Violet, areas of maceration and hugging wound bed   Primary dressing: WOUND BASE: Iodosorb and Endoform (confetti mix) to wound bed (Endoform covering Tendon)   Secondary dressing:  Kaltostat (2 layers folded) then Mextra 5" x 9" laid vertically.  Abram foam long x 2 (laid vertically)   Compression/Off-load: Open-Toe Football (cast padding x 2 rolls). Tubigrip, size D, double layer (calf size 34 cm) CAM Boot on the affected foot      MD verbal order for Left Deltoid:   Clean wound with normal saline, then flush with 10 mL of Vashe.   Cavilon to periwound.   Apply Endoform into wound bed. Cover with Mepilex border dressing.         Follow up in about 3 days (around 8/16/2024) for Wound Care.          "

## 2024-08-14 ENCOUNTER — INFUSION (OUTPATIENT)
Dept: INFECTIOUS DISEASES | Facility: HOSPITAL | Age: 54
End: 2024-08-14
Attending: PODIATRIST
Payer: MEDICARE

## 2024-08-14 ENCOUNTER — TELEPHONE (OUTPATIENT)
Dept: INFECTIOUS DISEASES | Facility: HOSPITAL | Age: 54
End: 2024-08-14
Payer: MEDICARE

## 2024-08-14 VITALS
RESPIRATION RATE: 18 BRPM | TEMPERATURE: 98 F | BODY MASS INDEX: 27.79 KG/M2 | WEIGHT: 209.69 LBS | DIASTOLIC BLOOD PRESSURE: 74 MMHG | HEART RATE: 98 BPM | OXYGEN SATURATION: 95 % | SYSTOLIC BLOOD PRESSURE: 127 MMHG | HEIGHT: 73 IN

## 2024-08-14 DIAGNOSIS — M86.672 CHRONIC OSTEOMYELITIS OF LEFT FOOT: ICD-10-CM

## 2024-08-14 DIAGNOSIS — M86.10 ACUTE ON CHRONIC OSTEOMYELITIS: Primary | ICD-10-CM

## 2024-08-14 DIAGNOSIS — M86.60 ACUTE ON CHRONIC OSTEOMYELITIS: Primary | ICD-10-CM

## 2024-08-14 LAB
ALBUMIN SERPL BCP-MCNC: 2.8 G/DL (ref 3.5–5.2)
ALP SERPL-CCNC: 202 U/L (ref 55–135)
ALT SERPL W/O P-5'-P-CCNC: 8 U/L (ref 10–44)
ANION GAP SERPL CALC-SCNC: 9 MMOL/L (ref 8–16)
AST SERPL-CCNC: 10 U/L (ref 10–40)
BASOPHILS # BLD AUTO: 0.03 K/UL (ref 0–0.2)
BASOPHILS NFR BLD: 0.6 % (ref 0–1.9)
BILIRUB SERPL-MCNC: 0.4 MG/DL (ref 0.1–1)
BUN SERPL-MCNC: 9 MG/DL (ref 6–20)
CALCIUM SERPL-MCNC: 9.1 MG/DL (ref 8.7–10.5)
CHLORIDE SERPL-SCNC: 105 MMOL/L (ref 95–110)
CK SERPL-CCNC: 89 U/L (ref 20–200)
CO2 SERPL-SCNC: 24 MMOL/L (ref 23–29)
CREAT SERPL-MCNC: 1 MG/DL (ref 0.5–1.4)
CRP SERPL-MCNC: 9.4 MG/L (ref 0–8.2)
DIFFERENTIAL METHOD BLD: ABNORMAL
EOSINOPHIL # BLD AUTO: 0.3 K/UL (ref 0–0.5)
EOSINOPHIL NFR BLD: 5.8 % (ref 0–8)
ERYTHROCYTE [DISTWIDTH] IN BLOOD BY AUTOMATED COUNT: 13.6 % (ref 11.5–14.5)
EST. GFR  (NO RACE VARIABLE): >60 ML/MIN/1.73 M^2
GLUCOSE SERPL-MCNC: 282 MG/DL (ref 70–110)
HCT VFR BLD AUTO: 38.3 % (ref 40–54)
HGB BLD-MCNC: 11.3 G/DL (ref 14–18)
IMM GRANULOCYTES # BLD AUTO: 0.01 K/UL (ref 0–0.04)
IMM GRANULOCYTES NFR BLD AUTO: 0.2 % (ref 0–0.5)
LYMPHOCYTES # BLD AUTO: 1.9 K/UL (ref 1–4.8)
LYMPHOCYTES NFR BLD: 39.6 % (ref 18–48)
MCH RBC QN AUTO: 27.8 PG (ref 27–31)
MCHC RBC AUTO-ENTMCNC: 29.5 G/DL (ref 32–36)
MCV RBC AUTO: 94 FL (ref 82–98)
MONOCYTES # BLD AUTO: 0.3 K/UL (ref 0.3–1)
MONOCYTES NFR BLD: 6.4 % (ref 4–15)
NEUTROPHILS # BLD AUTO: 2.3 K/UL (ref 1.8–7.7)
NEUTROPHILS NFR BLD: 47.4 % (ref 38–73)
NRBC BLD-RTO: 0 /100 WBC
PLATELET # BLD AUTO: 295 K/UL (ref 150–450)
PMV BLD AUTO: 9.9 FL (ref 9.2–12.9)
POTASSIUM SERPL-SCNC: 3.4 MMOL/L (ref 3.5–5.1)
PROT SERPL-MCNC: 7.6 G/DL (ref 6–8.4)
RBC # BLD AUTO: 4.07 M/UL (ref 4.6–6.2)
SODIUM SERPL-SCNC: 138 MMOL/L (ref 136–145)
WBC # BLD AUTO: 4.82 K/UL (ref 3.9–12.7)

## 2024-08-14 PROCEDURE — 36415 COLL VENOUS BLD VENIPUNCTURE: CPT | Performed by: INTERNAL MEDICINE

## 2024-08-14 PROCEDURE — 86140 C-REACTIVE PROTEIN: CPT | Performed by: INTERNAL MEDICINE

## 2024-08-14 PROCEDURE — 85025 COMPLETE CBC W/AUTO DIFF WBC: CPT | Performed by: INTERNAL MEDICINE

## 2024-08-14 PROCEDURE — 82550 ASSAY OF CK (CPK): CPT | Performed by: INTERNAL MEDICINE

## 2024-08-14 PROCEDURE — 36591 DRAW BLOOD OFF VENOUS DEVICE: CPT

## 2024-08-14 PROCEDURE — 80053 COMPREHEN METABOLIC PANEL: CPT | Performed by: INTERNAL MEDICINE

## 2024-08-14 RX ORDER — SODIUM CHLORIDE 0.9 % (FLUSH) 0.9 %
10 SYRINGE (ML) INJECTION
OUTPATIENT
Start: 2024-08-21

## 2024-08-14 RX ORDER — HEPARIN 100 UNIT/ML
500 SYRINGE INTRAVENOUS
OUTPATIENT
Start: 2024-08-21

## 2024-08-14 RX ORDER — HEPARIN 100 UNIT/ML
500 SYRINGE INTRAVENOUS
Status: DISCONTINUED | OUTPATIENT
Start: 2024-08-14 | End: 2024-08-14 | Stop reason: HOSPADM

## 2024-08-14 RX ORDER — SODIUM CHLORIDE 0.9 % (FLUSH) 0.9 %
10 SYRINGE (ML) INJECTION
Status: DISCONTINUED | OUTPATIENT
Start: 2024-08-14 | End: 2024-08-14 | Stop reason: HOSPADM

## 2024-08-14 NOTE — TELEPHONE ENCOUNTER
Discussed lab results with patient. K 3.4. Pt asymptomatic. Recommend eating food high in K. List provided. BG also high. Recommend he follow-up with his endocrinologist/ pcp to optimize his diabetes regimen. Reports tolerating abx well.

## 2024-08-14 NOTE — PROGRESS NOTES
Pt to clinic for lab draw and dressing change per MD orders.  .    SUELLEN PICC line dressing changed per sterile technique and hospital policies; pt tolerated well.    Once dressing change was completed, RN check cathflo in lines, and was able to successfully pull back blood from both lumens; at this time labs were draw from the purple luem of the picc per hospital policies; labs were labeled beside pt and pt confirmed; labs sent to lab via ;      Pt left in nad;  next appt for labs and dressing change given to pt;

## 2024-08-16 ENCOUNTER — HOSPITAL ENCOUNTER (OUTPATIENT)
Dept: WOUND CARE | Facility: HOSPITAL | Age: 54
Discharge: HOME | End: 2024-08-16
Attending: PODIATRIST
Payer: MEDICARE

## 2024-08-16 VITALS
DIASTOLIC BLOOD PRESSURE: 81 MMHG | RESPIRATION RATE: 12 BRPM | HEART RATE: 92 BPM | SYSTOLIC BLOOD PRESSURE: 137 MMHG | TEMPERATURE: 97 F

## 2024-08-16 DIAGNOSIS — M86.10 ACUTE ON CHRONIC OSTEOMYELITIS: ICD-10-CM

## 2024-08-16 DIAGNOSIS — M86.60 ACUTE ON CHRONIC OSTEOMYELITIS: ICD-10-CM

## 2024-08-16 DIAGNOSIS — L97.426 DIABETIC ULCER OF LEFT MIDFOOT ASSOCIATED WITH TYPE 2 DIABETES MELLITUS, WITH BONE INVOLVEMENT WITHOUT EVIDENCE OF NECROSIS: Primary | ICD-10-CM

## 2024-08-16 DIAGNOSIS — E11.621 DIABETIC ULCER OF LEFT MIDFOOT ASSOCIATED WITH TYPE 2 DIABETES MELLITUS, WITH BONE INVOLVEMENT WITHOUT EVIDENCE OF NECROSIS: Primary | ICD-10-CM

## 2024-08-16 PROCEDURE — 11042 DBRDMT SUBQ TIS 1ST 20SQCM/<: CPT | Performed by: PODIATRIST

## 2024-08-16 NOTE — PROGRESS NOTES
[facility]                Progress Note    Subjective:       Patient ID: Indio Rdyer Jr. is a 54 y.o. male.    Chief Complaint: Wound Check, Diabetic Foot Ulcer, and Dressing Change    Follow Up wound care visit. Patient ambulated with assistance of knee scooter for LLE to [facility]. Prescribed CAM Boot on left Foot. Patient denies fever, chills, nausea, vomiting or diarrhea at present. Patient denies pain or discomfort to wound beds at present. Patient reports not having any issues with dressings since last visit. Dressings appear intact without strikethrough drainage. Dressings removed & wounds cleaned by nurse. Changes made to dressing order; applied foot dressing per DPM order and Left Deltoid dressing per MD order. He reports still doing well with RUE PICC line at home infusions daily of ABX therapy.            Review of Systems      Objective:        Physical Exam    Vitals:    08/16/24 0821   BP: 137/81   Pulse: 92   Resp: 12   Temp: 96.9 °F (36.1 °C)       Assessment:           ICD-10-CM ICD-9-CM   1. Diabetic ulcer of left midfoot associated with type 2 diabetes mellitus, with bone involvement without evidence of necrosis  E11.621 250.80    L97.426 707.14   2. Acute on chronic osteomyelitis  M86.10 730.00    M86.60 730.10            (Retired) Wound 04/08/22 1137 Diabetic Ulcer Left plantar;lateral Foot (Active)   04/08/22 1137    Pre-existing: Yes   Primary Wound Type: Diabetic ulc   Side: Left   Orientation: plantar;lateral   Location: Foot   Wound Number:    Ankle-Brachial Index:    Pulses:    Removal Indication and Assessment:    Wound Outcome:    (Retired) Wound Type:    (Retired) Wound Length (cm):    (Retired) Wound Width (cm):    (Retired) Depth (cm):    Wound Description (Comments):    Removal Indications:    Wound Image    08/16/24 0825   Wound WDL ex 08/16/24 0825   Dressing Appearance Intact;Moist drainage 08/16/24 0825   Drainage Amount Moderate  08/16/24 0825   Drainage Characteristics/Odor Serosanguineous;No odor 08/16/24 0825   Appearance Pink;Moist;Tendon;Hypergranulation 08/16/24 0825   Tissue loss description Full thickness 08/16/24 0825   Black (%), Wound Tissue Color 0 % 08/16/24 0825   Red (%), Wound Tissue Color 98 % 08/16/24 0825   Yellow (%), Wound Tissue Color 2 % 08/16/24 0825   Periwound Area Pale white;Macerated 08/16/24 0825   Wound Edges Defined;Open 08/16/24 0825   Wound Length (cm) 6.8 cm 08/16/24 0825   Wound Width (cm) 3 cm 08/16/24 0825   Wound Depth (cm) 0.3 cm 08/16/24 0825   Wound Volume (cm^3) 6.12 cm^3 08/16/24 0825   Wound Surface Area (cm^2) 20.4 cm^2 08/16/24 0825   Tunneling (depth (cm)/location) 0 08/16/24 0825   Undermining (depth (cm)/location) 0 08/16/24 0825   Care Cleansed with:;Antimicrobial agent;Sterile normal saline;Debrided 08/16/24 0825   Dressing Changed 08/16/24 0825   Periwound Care Moisture barrier applied;Absorptive dressing applied 08/16/24 0825   Compression Tubular elasticized bandage 08/16/24 0825   Off Loading Football dressing;Off loading shoe 08/16/24 0825   Dressing Change Due 08/20/24 08/16/24 0825            Wound 06/25/24 Abscess Left upper Arm (Active)   06/25/24    Present on Original Admission: Y   Primary Wound Type: Abscess   Side: Left   Orientation: upper   Location: Arm   Wound Approximate Age at First Assessment (Weeks):    Wound Number:    Is this injury device related?:    Incision Type:    Closure Method:    Wound Description (Comments):    Type:    Additional Comments:    Ankle-Brachial Index:    Pulses:    Removal Indication and Assessment:    Wound Outcome:    Wound Image   08/16/24 0825   Dressing Appearance Intact;Moist drainage 08/16/24 0825   Drainage Amount Moderate 08/16/24 0825   Drainage Characteristics/Odor Serosanguineous;No odor 08/16/24 0825   Appearance Pink;Yellow;Moist 08/16/24 0825   Tissue loss description Full thickness 08/16/24 0825   Black (%), Wound Tissue Color 0  "% 08/16/24 0825   Red (%), Wound Tissue Color 98 % 08/16/24 0825   Yellow (%), Wound Tissue Color 2 % 08/16/24 0825   Periwound Area Dry;Intact 08/16/24 0825   Wound Edges Defined;Open 08/16/24 0825   Wound Length (cm) 0.8 cm 08/16/24 0825   Wound Width (cm) 0.3 cm 08/16/24 0825   Wound Depth (cm) 0.8 cm 08/16/24 0825   Wound Volume (cm^3) 0.192 cm^3 08/16/24 0825   Wound Surface Area (cm^2) 0.24 cm^2 08/16/24 0825   Tunneling (depth (cm)/location) 0 08/16/24 0825   Undermining (depth (cm)/location) 0.3 cm circ 08/16/24 0825   Care Cleansed with:;Antimicrobial agent;Sterile normal saline;Wound cleanser 08/16/24 0825   Dressing Changed 08/16/24 0825   Periwound Care Skin barrier film applied;Absorptive dressing applied 08/16/24 0825   Dressing Change Due 08/20/24 08/16/24 0825           Plan:              Tissue pathology and/or culture taken     [] Yes      [] No  Sharp debridement performed                   [] Yes       [] No  Labs ordered     [] Yes       [] No  Imaging ordered    [] Yes      [] No    Orders Placed This Encounter   Procedures    Change dressing     Wound Dressing Orders      Dressing change frequency twice a week (prn nurse visit)  Remove old dressing  Cleanse with: Hibiclens and Normal Saline then Vashe soak x 10 minutes    Moisturize dry skin on leg with Enlivaderm    Protect periwound with:  Gentian Violet, areas of maceration and hugging wound bed  Primary dressing: WOUND BASE: Endoform covering Tendon then Silvercel (2 layers)  Secondary dressing:  Mextra 5" x 9" laid vertically.  Abram foam long x 2 (laid vertically)  Compression/Off-load: Open-Toe Football (cast padding x 2 rolls). Tubigrip, size D, double layer (calf size 34 cm) CAM Boot on the affected foot     MD verbal order for Left Deltoid:  Clean wound with normal saline, then flush with 10 mL of Vashe.  Cavilon to periwound.  Apply Endoform into wound bed. Cover with Mepilex border dressing.    Change at nurse visit        Follow " up in about 4 days (around 8/20/2024) for Wound Care, Nurse Visit.          "Instruments:  Curette  Bleeding:  Minimal  Hemostasis Achieved: Yes  Method Used:  Pressure  Patient tolerance:  Patient tolerated the procedure well with no immediate complications      Patient will return to [facility] on Tuesday for nurse visit and in 1 week to see DPM.  Patient declined AVS, has MyChart.     Tissue pathology and/or culture taken     [] Yes      [x] No  Sharp debridement performed                   [x] Yes       [] No  Labs ordered     [] Yes       [x] No  Imaging ordered    [] Yes      [x] No    Orders Placed This Encounter   Procedures    Debridement     This order was created via procedure documentation     Standing Status:   Standing     Number of Occurrences:   1    Change dressing     Wound Dressing Orders      Dressing change frequency twice a week (prn nurse visit)  Remove old dressing  Cleanse with: Hibiclens and Normal Saline then Vashe soak x 10 minutes    Moisturize dry skin on leg with Enlivaderm    Protect periwound with:  Gentian Violet, areas of maceration and hugging wound bed  Primary dressing: WOUND BASE: Endoform covering Tendon then Silvercel (2 layers)  Secondary dressing:  Mextra 5" x 9" laid vertically.  Abram foam long x 2 (laid vertically)  Compression/Off-load: Open-Toe Football (cast padding x 2 rolls). Tubigrip, size D, double layer (calf size 34 cm) CAM Boot on the affected foot     MD verbal order for Left Deltoid:  Clean wound with normal saline, then flush with 10 mL of Vashe.  Cavilon to periwound.  Apply Endoform into wound bed. Cover with Mepilex border dressing.    Change at nurse visit        Follow up in about 4 days (around 8/20/2024) for Wound Care, Nurse Visit.       "

## 2024-08-20 ENCOUNTER — HOSPITAL ENCOUNTER (OUTPATIENT)
Dept: WOUND CARE | Facility: HOSPITAL | Age: 54
Discharge: HOME | End: 2024-08-20
Attending: FAMILY MEDICINE
Payer: MEDICARE

## 2024-08-20 VITALS
DIASTOLIC BLOOD PRESSURE: 59 MMHG | SYSTOLIC BLOOD PRESSURE: 140 MMHG | HEART RATE: 101 BPM | RESPIRATION RATE: 12 BRPM | TEMPERATURE: 98 F

## 2024-08-20 DIAGNOSIS — L97.426 DIABETIC ULCER OF LEFT MIDFOOT ASSOCIATED WITH TYPE 2 DIABETES MELLITUS, WITH BONE INVOLVEMENT WITHOUT EVIDENCE OF NECROSIS: Primary | ICD-10-CM

## 2024-08-20 DIAGNOSIS — M86.10 ACUTE ON CHRONIC OSTEOMYELITIS: ICD-10-CM

## 2024-08-20 DIAGNOSIS — E11.621 DIABETIC ULCER OF LEFT MIDFOOT ASSOCIATED WITH TYPE 2 DIABETES MELLITUS, WITH BONE INVOLVEMENT WITHOUT EVIDENCE OF NECROSIS: Primary | ICD-10-CM

## 2024-08-20 DIAGNOSIS — M86.60 ACUTE ON CHRONIC OSTEOMYELITIS: ICD-10-CM

## 2024-08-20 DIAGNOSIS — L02.414 ABSCESS OF LEFT ARM: ICD-10-CM

## 2024-08-20 PROCEDURE — 99213 OFFICE O/P EST LOW 20 MIN: CPT

## 2024-08-20 NOTE — PROGRESS NOTES
[facility]  [address]  [address]  Nurse Visit    Subjective:       Patient seen in clinic today. Patient ambulated with assistance of knee scooter for LLE to [facility]. Prescribed CAM Boot on left Foot. Patient denies fever, chills, nausea, vomiting or diarrhea at present. Patient denies pain or discomfort to wound beds at present. Dressings appear intact without strikethrough drainage. Dressings removed & wounds cleaned by nurse. Left Deltoid wound has less drainage, wound bed has area of fascia vs fat note to proximal aspect of wound bed. Patient declined AVS, has MyChart.  Consulted with MD on duty, dressing for Left Deltoid will remain the same as previous. Dressings changed as ordered.      Assessment:          (Retired) Wound 04/08/22 1137 Diabetic Ulcer Left plantar;lateral Foot (Active)   04/08/22 1137    Pre-existing: Yes   Primary Wound Type: Diabetic ulc   Side: Left   Orientation: plantar;lateral   Location: Foot   Wound Number:    Ankle-Brachial Index:    Pulses:    Removal Indication and Assessment:    Wound Outcome:    (Retired) Wound Type:    (Retired) Wound Length (cm):    (Retired) Wound Width (cm):    (Retired) Depth (cm):    Wound Description (Comments):    Removal Indications:    Wound WDL ex 08/20/24 1615   Dressing Appearance Intact;Moist drainage;Area marked 08/20/24 1615   Drainage Amount Large 08/20/24 1615   Drainage Characteristics/Odor Serosanguineous;Green;No odor 08/20/24 1615   Appearance Pink;Tendon;Moist 08/20/24 1615   Tissue loss description Full thickness 08/20/24 1615   Black (%), Wound Tissue Color 0 % 08/20/24 1615   Red (%), Wound Tissue Color 96 % 08/20/24 1615   Yellow (%), Wound Tissue Color 4 % 08/20/24 1615   Periwound Area Pale white;Macerated 08/20/24 1615   Wound Edges Defined;Open 08/20/24 1615   Wound Length (cm) 6.8 cm 08/20/24 1615   Wound Width (cm) 3.2 cm 08/20/24 1615   Wound Depth (cm) 0.2 cm 08/20/24 1615   Wound Volume  (cm^3) 4.352 cm^3 08/20/24 1615   Wound Surface Area (cm^2) 21.76 cm^2 08/20/24 1615   Tunneling (depth (cm)/location) 0 08/20/24 1615   Undermining (depth (cm)/location) 0 08/20/24 1615   Care Cleansed with:;Antimicrobial agent 08/20/24 1615   Dressing Changed 08/20/24 1615   Periwound Care Moisture barrier applied;Absorptive dressing applied 08/20/24 1615   Compression Tubular elasticized bandage 08/20/24 1615   Off Loading Football dressing;Off loading shoe 08/20/24 1615   Dressing Change Due 08/23/24 08/20/24 1615            Wound 06/25/24 Abscess Left upper Arm (Active)   06/25/24    Present on Original Admission: Y   Primary Wound Type: Abscess   Side: Left   Orientation: upper   Location: Arm   Wound Approximate Age at First Assessment (Weeks):    Wound Number:    Is this injury device related?:    Incision Type:    Closure Method:    Wound Description (Comments):    Type:    Additional Comments:    Ankle-Brachial Index:    Pulses:    Removal Indication and Assessment:    Wound Outcome:    Wound Image   08/20/24 1615   Dressing Appearance Intact;Moist drainage 08/20/24 1615   Drainage Amount Small 08/20/24 1615   Drainage Characteristics/Odor Serosanguineous;No odor 08/20/24 1615   Appearance Red;Yellow;Moist 08/20/24 1615   Tissue loss description Full thickness 08/20/24 1615   Black (%), Wound Tissue Color 0 % 08/20/24 1615   Red (%), Wound Tissue Color 97 % 08/20/24 1615   Yellow (%), Wound Tissue Color 3 % 08/20/24 1615   Periwound Area Dry;Intact 08/20/24 1615   Wound Edges Defined;Open 08/20/24 1615   Wound Length (cm) 0.7 cm 08/20/24 1615   Wound Width (cm) 0.3 cm 08/20/24 1615   Wound Depth (cm) 0.8 cm 08/20/24 1615   Wound Volume (cm^3) 0.168 cm^3 08/20/24 1615   Wound Surface Area (cm^2) 0.21 cm^2 08/20/24 1615   Tunneling (depth (cm)/location) 0 08/20/24 1615   Undermining (depth (cm)/location) 0.3 cm circumferential 08/20/24 1615   Care Cleansed with:;Antimicrobial agent;Sterile normal  "saline;Wound cleanser 08/20/24 1615   Dressing Changed 08/20/24 1615   Periwound Care Skin barrier film applied;Absorptive dressing applied 08/20/24 1615   Dressing Change Due 08/23/24 08/20/24 1615           Plan:     Wound Dressing Orders       Dressing change frequency twice a week (prn nurse visit)   Remove old dressing   Cleanse with: Hibiclens and Normal Saline then Vashe soak x 10 minutes     Moisturize dry skin on leg with Enlivaderm     Protect periwound with:  Gentian Violet, areas of maceration and hugging wound bed   Primary dressing: WOUND BASE: Endoform covering Tendon then Silvercel (2 layers)   Secondary dressing:  Mextra 5" x 9" laid vertically.  Abram foam long x 2 (laid vertically)   Compression/Off-load: Open-Toe Football (cast padding x 2 rolls). Tubigrip, size D, double layer (calf size 34 cm) CAM Boot on the affected foot      MD verbal order for Left Deltoid:   Clean wound with normal saline, then flush with 10 mL of Vashe.   Cavilon to periwound.   Apply Endoform into wound bed, then Silvercel. Cover with Mepilex border dressing           Follow up in about 3 days (around 8/23/2024) for Wound Care or Nurse Visit.          "

## 2024-08-21 ENCOUNTER — INFUSION (OUTPATIENT)
Dept: INFECTIOUS DISEASES | Facility: HOSPITAL | Age: 54
End: 2024-08-21
Attending: PODIATRIST
Payer: MEDICARE

## 2024-08-21 VITALS
HEIGHT: 73 IN | OXYGEN SATURATION: 98 % | HEART RATE: 96 BPM | BODY MASS INDEX: 27.86 KG/M2 | SYSTOLIC BLOOD PRESSURE: 125 MMHG | DIASTOLIC BLOOD PRESSURE: 70 MMHG | TEMPERATURE: 99 F | WEIGHT: 210.19 LBS | RESPIRATION RATE: 19 BRPM

## 2024-08-21 DIAGNOSIS — M86.672 CHRONIC OSTEOMYELITIS OF LEFT FOOT: ICD-10-CM

## 2024-08-21 DIAGNOSIS — M86.60 ACUTE ON CHRONIC OSTEOMYELITIS: Primary | ICD-10-CM

## 2024-08-21 DIAGNOSIS — M86.10 ACUTE ON CHRONIC OSTEOMYELITIS: Primary | ICD-10-CM

## 2024-08-21 LAB
ALBUMIN SERPL BCP-MCNC: 2.7 G/DL (ref 3.5–5.2)
ALP SERPL-CCNC: 186 U/L (ref 55–135)
ALT SERPL W/O P-5'-P-CCNC: 9 U/L (ref 10–44)
ANION GAP SERPL CALC-SCNC: 7 MMOL/L (ref 8–16)
AST SERPL-CCNC: 13 U/L (ref 10–40)
BASOPHILS # BLD AUTO: 0.03 K/UL (ref 0–0.2)
BASOPHILS NFR BLD: 0.5 % (ref 0–1.9)
BILIRUB SERPL-MCNC: 0.4 MG/DL (ref 0.1–1)
BUN SERPL-MCNC: 7 MG/DL (ref 6–20)
CALCIUM SERPL-MCNC: 8.8 MG/DL (ref 8.7–10.5)
CHLORIDE SERPL-SCNC: 107 MMOL/L (ref 95–110)
CK SERPL-CCNC: 108 U/L (ref 20–200)
CO2 SERPL-SCNC: 26 MMOL/L (ref 23–29)
CREAT SERPL-MCNC: 0.9 MG/DL (ref 0.5–1.4)
CRP SERPL-MCNC: 4.5 MG/L (ref 0–8.2)
DIFFERENTIAL METHOD BLD: ABNORMAL
EOSINOPHIL # BLD AUTO: 0.1 K/UL (ref 0–0.5)
EOSINOPHIL NFR BLD: 2.2 % (ref 0–8)
ERYTHROCYTE [DISTWIDTH] IN BLOOD BY AUTOMATED COUNT: 13.9 % (ref 11.5–14.5)
EST. GFR  (NO RACE VARIABLE): >60 ML/MIN/1.73 M^2
GLUCOSE SERPL-MCNC: 192 MG/DL (ref 70–110)
HCT VFR BLD AUTO: 34.8 % (ref 40–54)
HGB BLD-MCNC: 10.7 G/DL (ref 14–18)
IMM GRANULOCYTES # BLD AUTO: 0.02 K/UL (ref 0–0.04)
IMM GRANULOCYTES NFR BLD AUTO: 0.3 % (ref 0–0.5)
LYMPHOCYTES # BLD AUTO: 1.6 K/UL (ref 1–4.8)
LYMPHOCYTES NFR BLD: 27 % (ref 18–48)
MCH RBC QN AUTO: 28.8 PG (ref 27–31)
MCHC RBC AUTO-ENTMCNC: 30.7 G/DL (ref 32–36)
MCV RBC AUTO: 94 FL (ref 82–98)
MONOCYTES # BLD AUTO: 0.4 K/UL (ref 0.3–1)
MONOCYTES NFR BLD: 7 % (ref 4–15)
NEUTROPHILS # BLD AUTO: 3.8 K/UL (ref 1.8–7.7)
NEUTROPHILS NFR BLD: 63 % (ref 38–73)
NRBC BLD-RTO: 0 /100 WBC
PLATELET # BLD AUTO: 265 K/UL (ref 150–450)
PMV BLD AUTO: 10.9 FL (ref 9.2–12.9)
POTASSIUM SERPL-SCNC: 3.4 MMOL/L (ref 3.5–5.1)
PROT SERPL-MCNC: 6.9 G/DL (ref 6–8.4)
RBC # BLD AUTO: 3.71 M/UL (ref 4.6–6.2)
SODIUM SERPL-SCNC: 140 MMOL/L (ref 136–145)
WBC # BLD AUTO: 5.96 K/UL (ref 3.9–12.7)

## 2024-08-21 PROCEDURE — 36592 COLLECT BLOOD FROM PICC: CPT

## 2024-08-21 PROCEDURE — 86140 C-REACTIVE PROTEIN: CPT | Performed by: INTERNAL MEDICINE

## 2024-08-21 PROCEDURE — 63600175 PHARM REV CODE 636 W HCPCS: Performed by: INTERNAL MEDICINE

## 2024-08-21 PROCEDURE — 80053 COMPREHEN METABOLIC PANEL: CPT | Performed by: INTERNAL MEDICINE

## 2024-08-21 PROCEDURE — 85025 COMPLETE CBC W/AUTO DIFF WBC: CPT | Performed by: INTERNAL MEDICINE

## 2024-08-21 PROCEDURE — 25000003 PHARM REV CODE 250: Performed by: INTERNAL MEDICINE

## 2024-08-21 PROCEDURE — 36415 COLL VENOUS BLD VENIPUNCTURE: CPT | Performed by: INTERNAL MEDICINE

## 2024-08-21 PROCEDURE — A4216 STERILE WATER/SALINE, 10 ML: HCPCS | Performed by: INTERNAL MEDICINE

## 2024-08-21 PROCEDURE — 82550 ASSAY OF CK (CPK): CPT | Performed by: INTERNAL MEDICINE

## 2024-08-21 RX ORDER — SODIUM CHLORIDE 0.9 % (FLUSH) 0.9 %
10 SYRINGE (ML) INJECTION
Status: COMPLETED | OUTPATIENT
Start: 2024-08-21 | End: 2024-08-21

## 2024-08-21 RX ORDER — SODIUM CHLORIDE 0.9 % (FLUSH) 0.9 %
10 SYRINGE (ML) INJECTION
OUTPATIENT
Start: 2024-08-28

## 2024-08-21 RX ORDER — HEPARIN 100 UNIT/ML
500 SYRINGE INTRAVENOUS
Status: COMPLETED | OUTPATIENT
Start: 2024-08-21 | End: 2024-08-21

## 2024-08-21 RX ORDER — HEPARIN 100 UNIT/ML
500 SYRINGE INTRAVENOUS
OUTPATIENT
Start: 2024-08-28

## 2024-08-21 RX ADMIN — HEPARIN 500 UNITS: 100 SYRINGE at 12:08

## 2024-08-21 RX ADMIN — Medication 10 ML: at 12:08

## 2024-08-21 NOTE — PROGRESS NOTES
Patient arrive to clinic for lab draw and dressing change per MD orders.    SUELLEN PICC line, intact and able to flushed with NS without difficulty and with good blood return noted. Lab blood draw orders received and collected as ordered. Blood specimen sent to Lab via . SUELLEN PICC line double lumen flushed with heparin as ordered.    SUELLEN PICC line dressing changed per facility protocol; sterile technique and good handwashing observed. Patient tolerated well.    Limited head-to-toe assessment due to privacy issues and visit reason though the opportunity was given for patient to express any concerns.    Pt left in nad;  next appt for labs and dressing change given to pt;

## 2024-08-23 ENCOUNTER — HOSPITAL ENCOUNTER (OUTPATIENT)
Dept: WOUND CARE | Facility: HOSPITAL | Age: 54
Discharge: HOME | End: 2024-08-23
Attending: PODIATRIST
Payer: MEDICARE

## 2024-08-23 VITALS
HEART RATE: 101 BPM | TEMPERATURE: 97 F | SYSTOLIC BLOOD PRESSURE: 117 MMHG | DIASTOLIC BLOOD PRESSURE: 70 MMHG | RESPIRATION RATE: 14 BRPM

## 2024-08-23 DIAGNOSIS — M86.60 ACUTE ON CHRONIC OSTEOMYELITIS: Primary | ICD-10-CM

## 2024-08-23 DIAGNOSIS — L97.426 DIABETIC ULCER OF LEFT MIDFOOT ASSOCIATED WITH TYPE 2 DIABETES MELLITUS, WITH BONE INVOLVEMENT WITHOUT EVIDENCE OF NECROSIS: ICD-10-CM

## 2024-08-23 DIAGNOSIS — E11.621 DIABETIC ULCER OF LEFT MIDFOOT ASSOCIATED WITH TYPE 2 DIABETES MELLITUS, WITH BONE INVOLVEMENT WITHOUT EVIDENCE OF NECROSIS: ICD-10-CM

## 2024-08-23 DIAGNOSIS — M86.10 ACUTE ON CHRONIC OSTEOMYELITIS: Primary | ICD-10-CM

## 2024-08-23 PROCEDURE — 99213 OFFICE O/P EST LOW 20 MIN: CPT

## 2024-08-23 NOTE — PROGRESS NOTES
"[facility]  [address]   [address]  [id number]  Nurse Visit    Subjective:       Patient seen in clinic today. Patient ambulated with assistance of knee scooter for LLE to [facility]. Prescribed CAM Boot on Left Foot. Patient denies fever, chills, nausea, vomiting or diarrhea at present. Patient denies pain or discomfort to wound beds at present. Dressings appear intact no strike through drainage from Left Deltoid, but there is strikethrough drainage to Plantar aspect of Left Foot touching outer layer of Tubigrip. 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 Dressings removed & wounds cleaned by nurse. Left Deltoid wound bed is red and moist. Left Plantar Foot wound is pink, red and moist with a small area of tendon showing, the deepest area of the wound bed does probe to bone. Dressing changed as ordered. New CAM boot applied to LLE (XL size).  Patient declined AVS, has MyChart.       Assessment:          (Retired) Wound 04/08/22 1137 Diabetic Ulcer Left plantar;lateral Foot (Active)   04/08/22 1137    Pre-existing: Yes   Primary Wound Type: Diabetic [facility]   Side: Left   Orientation: plantar;lateral   Location: Foot   Wound Number:    Ankle-Brachial Index:    Pulses:    Removal Indication and Assessment:    Wound Outcome:    (Retired) Wound Type:    (Retired) Wound Length (cm):    (Retired) Wound Width (cm):    (Retired) Depth (cm):    Wound Description (Comments):    Removal Indications:    Wound Image    08/23/24 0915   Wound WDL ex 08/23/24 0915   Dressing Appearance Intact;Moist drainage;Area marked 08/23/24 0915   Drainage Amount Large 08/23/24 0915   Drainage Characteristics/Odor Serosanguineous;Yellow;No odor 08/23/24 0915   Appearance Pink;Red;Moist;Tendon;Bone 08/23/24 0915   Tissue loss description Full thickness 08/23/24 0915   Black (%), Wound Tissue Color 0 % 08/23/24 0915   Red (%), Wound Tissue Color 95 % 08/23/24 0915 "   Yellow (%), Wound Tissue Color 5 % 08/23/24 0915   Periwound Area Pale white;Macerated 08/23/24 0915   Wound Edges Defined;Open 08/23/24 0915   Wound Length (cm) 6.8 cm 08/23/24 0915   Wound Width (cm) 3.7 cm 08/23/24 0915   Wound Depth (cm) 0.3 cm 08/23/24 0915   Wound Volume (cm^3) 7.548 cm^3 08/23/24 0915   Wound Surface Area (cm^2) 25.16 cm^2 08/23/24 0915   Tunneling (depth (cm)/location) 0 08/23/24 0915   Undermining (depth (cm)/location) 0 08/23/24 0915   Care Cleansed with:;Antimicrobial agent;Sterile normal saline;Wound cleanser 08/23/24 0915   Dressing Changed 08/23/24 0915   Periwound Care Moisture barrier applied;Absorptive dressing applied 08/23/24 0915   Compression Tubular elasticized bandage 08/23/24 0915   Off Loading Football dressing;Off loading shoe 08/23/24 0915   Dressing Change Due 08/27/24 08/23/24 0915            Wound 06/25/24 Abscess Left upper Arm (Active)   06/25/24    Present on Original Admission: Y   Primary Wound Type: Abscess   Side: Left   Orientation: upper   Location: Arm   Wound Approximate Age at First Assessment (Weeks):    Wound Number:    Is this injury device related?:    Incision Type:    Closure Method:    Wound Description (Comments):    Type:    Additional Comments:    Ankle-Brachial Index:    Pulses:    Removal Indication and Assessment:    Wound Outcome:    Wound Image   08/23/24 0915   Dressing Appearance Intact;Moist drainage 08/23/24 0915   Drainage Amount Small 08/23/24 0915   Drainage Characteristics/Odor Serosanguineous;No odor 08/23/24 0915   Appearance Red;Moist 08/23/24 0915   Tissue loss description Full thickness 08/23/24 0915   Black (%), Wound Tissue Color 0 % 08/23/24 0915   Red (%), Wound Tissue Color 100 % 08/23/24 0915   Yellow (%), Wound Tissue Color 0 % 08/23/24 0915   Periwound Area Dry;Intact 08/23/24 0915   Wound Edges Defined;Open 08/23/24 0915   Wound Length (cm) 0.7 cm 08/23/24 0915   Wound Width (cm) 0.3 cm 08/23/24 0915   Wound Depth (cm)  "0.6 cm 08/23/24 0915   Wound Volume (cm^3) 0.126 cm^3 08/23/24 0915   Wound Surface Area (cm^2) 0.21 cm^2 08/23/24 0915   Tunneling (depth (cm)/location) 0 08/23/24 0915   Undermining (depth (cm)/location) 0 08/23/24 0915   Care Cleansed with:;Antimicrobial agent;Sterile normal saline;Wound cleanser 08/23/24 0915   Dressing Changed 08/23/24 0915   Periwound Care Skin barrier film applied;Absorptive dressing applied 08/23/24 0915   Dressing Change Due 08/27/24 08/23/24 0915           Plan:     Wound Dressing Orders       Dressing change frequency twice a week (prn nurse visit)   Remove old dressing   Cleanse with: Hibiclens and Normal Saline then Vashe soak x 10 minutes     Moisturize dry skin on leg with Enlivaderm     Protect periwound with:  Gentian Violet, areas of maceration and hugging wound bed   Primary dressing: WOUND BASE: Endoform covering Tendon and area where bone is felt, then Silvercel (2 layers)   Secondary dressing:  Mextra 5" x 9" laid vertically.  Abram foam long x 2 (laid vertically)   Compression/Off-load: Open-Toe Football (cast padding x 2 rolls). Tubigrip, size D, double layer (calf size 34 cm) CAM Boot on the affected foot      MD verbal order for Left Deltoid:   Clean wound with normal saline, then flush with 10 mL of Vashe.   Cavilon to periwound.   Apply Endoform into wound bed, then Silvercel. Cover with Mepilex border dressing           Follow up in about 4 days (around 8/27/2024) for Nurse Visit.        "

## 2024-08-27 ENCOUNTER — PATIENT MESSAGE (OUTPATIENT)
Dept: INFECTIOUS DISEASES | Facility: CLINIC | Age: 54
End: 2024-08-27
Payer: MEDICARE

## 2024-08-27 ENCOUNTER — HOSPITAL ENCOUNTER (OUTPATIENT)
Dept: WOUND CARE | Facility: HOSPITAL | Age: 54
Discharge: HOME | End: 2024-08-27
Attending: FAMILY MEDICINE
Payer: MEDICARE

## 2024-08-27 VITALS
HEART RATE: 104 BPM | RESPIRATION RATE: 14 BRPM | TEMPERATURE: 98 F | SYSTOLIC BLOOD PRESSURE: 112 MMHG | DIASTOLIC BLOOD PRESSURE: 66 MMHG

## 2024-08-27 DIAGNOSIS — L97.426 DIABETIC ULCER OF LEFT MIDFOOT ASSOCIATED WITH TYPE 2 DIABETES MELLITUS, WITH BONE INVOLVEMENT WITHOUT EVIDENCE OF NECROSIS: ICD-10-CM

## 2024-08-27 DIAGNOSIS — M86.60 ACUTE ON CHRONIC OSTEOMYELITIS: Primary | ICD-10-CM

## 2024-08-27 DIAGNOSIS — E11.621 DIABETIC ULCER OF LEFT MIDFOOT ASSOCIATED WITH TYPE 2 DIABETES MELLITUS, WITH BONE INVOLVEMENT WITHOUT EVIDENCE OF NECROSIS: ICD-10-CM

## 2024-08-27 DIAGNOSIS — L02.414 ABSCESS OF LEFT ARM: ICD-10-CM

## 2024-08-27 DIAGNOSIS — M86.10 ACUTE ON CHRONIC OSTEOMYELITIS: Primary | ICD-10-CM

## 2024-08-27 PROCEDURE — 99213 OFFICE O/P EST LOW 20 MIN: CPT

## 2024-08-27 NOTE — PROGRESS NOTES
[facility] [address]  [address]  Nurse Visit    Subjective:       Patient seen in clinic today. Patient ambulated with assistance of knee scooter for LLE to [facility]. Prescribed CAM Boot on left Foot. Patient denies fever, chills, nausea, vomiting or diarrhea at present. Patient denies pain or discomfort to wound beds at present. Dressings appear intact without strikethrough drainage. Dressings removed & wounds cleaned by nurse. Left Plantar Foot wound still has deep area that probes to bone. Left Deltoid wound appears red, moist and bleeding after cleaning. Dressings changed as ordered, consult with Dr. Burrell regard Left Deltoid wound, will change to [place] collagen at next visit. Patient declined AVS, has MyChart.       Assessment:          (Retired) Wound 04/08/22 1137 Diabetic Ulcer Left plantar;lateral Foot (Active)   04/08/22 1137    Pre-existing: Yes   Primary Wound Type: Diabetic ulc   Side: Left   Orientation: plantar;lateral   Location: Foot   Wound Number:    Ankle-Brachial Index:    Pulses:    Removal Indication and Assessment:    Wound Outcome:    (Retired) Wound Type:    (Retired) Wound Length (cm):    (Retired) Wound Width (cm):    (Retired) Depth (cm):    Wound Description (Comments):    Removal Indications:    Wound Image   08/27/24 1614   Wound WDL ex 08/27/24 1614   Dressing Appearance Intact;Moist drainage 08/27/24 1614   Drainage Amount Large 08/27/24 1614   Drainage Characteristics/Odor Serosanguineous;No odor 08/27/24 1614   Appearance Red;Moist;Yellow;Tendon;Bone 08/27/24 1614   Tissue loss description Full thickness 08/27/24 1614   Black (%), Wound Tissue Color 0 % 08/27/24 1614   Red (%), Wound Tissue Color 100 % 08/27/24 1614   Yellow (%), Wound Tissue Color 0 % 08/27/24 1614   Periwound Area Dry;Intact 08/27/24 1614   Wound Edges Defined;Open 08/27/24 1614   Wound Length (cm) 6.8 cm 08/27/24 1614   Wound Width (cm) 3.2 cm 08/27/24 1614   Wound Depth  (cm) 0.3 cm 08/27/24 1614   Wound Volume (cm^3) 6.528 cm^3 08/27/24 1614   Wound Surface Area (cm^2) 21.76 cm^2 08/27/24 1614   Tunneling (depth (cm)/location) 0 08/27/24 1614   Undermining (depth (cm)/location) 0 08/27/24 1614   Care Cleansed with:;Antimicrobial agent;Sterile normal saline;Wound cleanser 08/27/24 1614   Dressing Changed 08/27/24 1614   Periwound Care Moisture barrier applied;Absorptive dressing applied 08/27/24 1614   Compression Tubular elasticized bandage 08/27/24 1614   Off Loading Football dressing;Off loading shoe 08/27/24 1614   Dressing Change Due 08/30/24 08/27/24 1614            Wound 06/25/24 Abscess Left upper Arm (Active)   06/25/24    Present on Original Admission: Y   Primary Wound Type: Abscess   Side: Left   Orientation: upper   Location: Arm   Wound Approximate Age at First Assessment (Weeks):    Wound Number:    Is this injury device related?:    Incision Type:    Closure Method:    Wound Description (Comments):    Type:    Additional Comments:    Ankle-Brachial Index:    Pulses:    Removal Indication and Assessment:    Wound Outcome:    Wound Image   08/27/24 1614   Dressing Appearance Intact;Moist drainage 08/27/24 1614   Drainage Amount Moderate 08/27/24 1614   Drainage Characteristics/Odor Serosanguineous;No odor 08/27/24 1614   Appearance Red;Moist;Bleeding 08/27/24 1614   Tissue loss description Full thickness 08/27/24 1614   Black (%), Wound Tissue Color 0 % 08/27/24 1614   Red (%), Wound Tissue Color 100 % 08/27/24 1614   Yellow (%), Wound Tissue Color 0 % 08/27/24 1614   Periwound Area Dry;Intact 08/27/24 1614   Wound Edges Defined;Open 08/27/24 1614   Wound Length (cm) 0.8 cm 08/27/24 1614   Wound Width (cm) 0.2 cm 08/27/24 1614   Wound Depth (cm) 0.6 cm 08/27/24 1614   Wound Volume (cm^3) 0.096 cm^3 08/27/24 1614   Wound Surface Area (cm^2) 0.16 cm^2 08/27/24 1614   Tunneling (depth (cm)/location) 0 08/27/24 1614   Undermining (depth (cm)/location) 0 08/27/24 1614  "  Care Cleansed with:;Antimicrobial agent;Sterile normal saline;Wound cleanser 08/27/24 1614   Dressing Changed 08/27/24 1614   Periwound Care Skin barrier film applied;Absorptive dressing applied 08/27/24 1614   Dressing Change Due 08/30/24 08/27/24 1614           Plan:     Wound Dressing Orders       Dressing change frequency twice a week (prn nurse visit)   Remove old dressing   Cleanse with: Hibiclens and Normal Saline then Vashe soak x 10 minutes     Moisturize dry skin on leg with Enlivaderm     Protect periwound with:  Gentian Violet, areas of maceration and hugging wound bed   Primary dressing: WOUND BASE: Endoform covering Tendon and area where bone is felt, then Silvercel (2 layers)   Secondary dressing:  Mextra 5" x 9" laid vertically.  Abram foam long x 2 (laid vertically)   Compression/Off-load: Open-Toe Football (cast padding x 2 rolls). Tubigrip, size D, double layer (calf size 34 cm) CAM Boot on the affected foot      MD verbal order for Left Deltoid:   Clean wound with normal saline, then flush with 10 mL of Vashe.   Cavilon to periwound.   Apply Endoform into wound bed, then Silvercel. Cover with Mepilex border dressing           Follow up in about 3 days (around 8/30/2024) for Nurse Visit.        "

## 2024-08-28 ENCOUNTER — INFUSION (OUTPATIENT)
Dept: INFECTIOUS DISEASES | Facility: HOSPITAL | Age: 54
End: 2024-08-28
Attending: PODIATRIST
Payer: MEDICARE

## 2024-08-28 VITALS
BODY MASS INDEX: 28.83 KG/M2 | TEMPERATURE: 98 F | RESPIRATION RATE: 20 BRPM | SYSTOLIC BLOOD PRESSURE: 141 MMHG | HEIGHT: 73 IN | WEIGHT: 217.5 LBS | DIASTOLIC BLOOD PRESSURE: 79 MMHG | HEART RATE: 90 BPM | OXYGEN SATURATION: 98 %

## 2024-08-28 DIAGNOSIS — M86.60 ACUTE ON CHRONIC OSTEOMYELITIS: Primary | ICD-10-CM

## 2024-08-28 DIAGNOSIS — M86.10 ACUTE ON CHRONIC OSTEOMYELITIS: Primary | ICD-10-CM

## 2024-08-28 DIAGNOSIS — M86.672 CHRONIC OSTEOMYELITIS OF LEFT FOOT: ICD-10-CM

## 2024-08-28 LAB
ALBUMIN SERPL BCP-MCNC: 2.7 G/DL (ref 3.5–5.2)
ALP SERPL-CCNC: 183 U/L (ref 55–135)
ALT SERPL W/O P-5'-P-CCNC: 7 U/L (ref 10–44)
ANION GAP SERPL CALC-SCNC: 13 MMOL/L (ref 8–16)
AST SERPL-CCNC: 15 U/L (ref 10–40)
BASOPHILS # BLD AUTO: 0.03 K/UL (ref 0–0.2)
BASOPHILS NFR BLD: 0.6 % (ref 0–1.9)
BILIRUB SERPL-MCNC: 0.3 MG/DL (ref 0.1–1)
BUN SERPL-MCNC: 7 MG/DL (ref 6–20)
CALCIUM SERPL-MCNC: 9.3 MG/DL (ref 8.7–10.5)
CHLORIDE SERPL-SCNC: 103 MMOL/L (ref 95–110)
CK SERPL-CCNC: 218 U/L (ref 20–200)
CO2 SERPL-SCNC: 24 MMOL/L (ref 23–29)
CREAT SERPL-MCNC: 1.3 MG/DL (ref 0.5–1.4)
CRP SERPL-MCNC: 11.6 MG/L (ref 0–8.2)
DIFFERENTIAL METHOD BLD: ABNORMAL
EOSINOPHIL # BLD AUTO: 0.2 K/UL (ref 0–0.5)
EOSINOPHIL NFR BLD: 3.3 % (ref 0–8)
ERYTHROCYTE [DISTWIDTH] IN BLOOD BY AUTOMATED COUNT: 14 % (ref 11.5–14.5)
EST. GFR  (NO RACE VARIABLE): >60 ML/MIN/1.73 M^2
GLUCOSE SERPL-MCNC: 343 MG/DL (ref 70–110)
HCT VFR BLD AUTO: 34.9 % (ref 40–54)
HGB BLD-MCNC: 10.5 G/DL (ref 14–18)
IMM GRANULOCYTES # BLD AUTO: 0.01 K/UL (ref 0–0.04)
IMM GRANULOCYTES NFR BLD AUTO: 0.2 % (ref 0–0.5)
LYMPHOCYTES # BLD AUTO: 1.7 K/UL (ref 1–4.8)
LYMPHOCYTES NFR BLD: 33.9 % (ref 18–48)
MCH RBC QN AUTO: 28.3 PG (ref 27–31)
MCHC RBC AUTO-ENTMCNC: 30.1 G/DL (ref 32–36)
MCV RBC AUTO: 94 FL (ref 82–98)
MONOCYTES # BLD AUTO: 0.3 K/UL (ref 0.3–1)
MONOCYTES NFR BLD: 6.5 % (ref 4–15)
NEUTROPHILS # BLD AUTO: 2.8 K/UL (ref 1.8–7.7)
NEUTROPHILS NFR BLD: 55.5 % (ref 38–73)
NRBC BLD-RTO: 0 /100 WBC
PLATELET # BLD AUTO: 304 K/UL (ref 150–450)
PMV BLD AUTO: 10.8 FL (ref 9.2–12.9)
POTASSIUM SERPL-SCNC: 3.5 MMOL/L (ref 3.5–5.1)
PROT SERPL-MCNC: 7.3 G/DL (ref 6–8.4)
RBC # BLD AUTO: 3.71 M/UL (ref 4.6–6.2)
SODIUM SERPL-SCNC: 140 MMOL/L (ref 136–145)
WBC # BLD AUTO: 5.1 K/UL (ref 3.9–12.7)

## 2024-08-28 PROCEDURE — 63600175 PHARM REV CODE 636 W HCPCS: Performed by: INTERNAL MEDICINE

## 2024-08-28 PROCEDURE — 85025 COMPLETE CBC W/AUTO DIFF WBC: CPT | Performed by: INTERNAL MEDICINE

## 2024-08-28 PROCEDURE — 82550 ASSAY OF CK (CPK): CPT | Performed by: INTERNAL MEDICINE

## 2024-08-28 PROCEDURE — 80053 COMPREHEN METABOLIC PANEL: CPT | Performed by: INTERNAL MEDICINE

## 2024-08-28 PROCEDURE — 86140 C-REACTIVE PROTEIN: CPT | Performed by: INTERNAL MEDICINE

## 2024-08-28 PROCEDURE — 25000003 PHARM REV CODE 250: Performed by: INTERNAL MEDICINE

## 2024-08-28 PROCEDURE — 36592 COLLECT BLOOD FROM PICC: CPT

## 2024-08-28 PROCEDURE — A4216 STERILE WATER/SALINE, 10 ML: HCPCS | Performed by: INTERNAL MEDICINE

## 2024-08-28 RX ORDER — SODIUM CHLORIDE 0.9 % (FLUSH) 0.9 %
10 SYRINGE (ML) INJECTION
OUTPATIENT
Start: 2024-09-04

## 2024-08-28 RX ORDER — HEPARIN 100 UNIT/ML
500 SYRINGE INTRAVENOUS
Status: COMPLETED | OUTPATIENT
Start: 2024-08-28 | End: 2024-08-28

## 2024-08-28 RX ORDER — HEPARIN 100 UNIT/ML
500 SYRINGE INTRAVENOUS
OUTPATIENT
Start: 2024-09-04

## 2024-08-28 RX ORDER — SODIUM CHLORIDE 0.9 % (FLUSH) 0.9 %
10 SYRINGE (ML) INJECTION
Status: DISCONTINUED | OUTPATIENT
Start: 2024-08-28 | End: 2024-08-28 | Stop reason: HOSPADM

## 2024-08-28 RX ORDER — HEPARIN 100 UNIT/ML
500 SYRINGE INTRAVENOUS
Status: DISCONTINUED | OUTPATIENT
Start: 2024-08-28 | End: 2024-08-28 | Stop reason: HOSPADM

## 2024-08-28 RX ORDER — SODIUM CHLORIDE 0.9 % (FLUSH) 0.9 %
10 SYRINGE (ML) INJECTION
Status: COMPLETED | OUTPATIENT
Start: 2024-08-28 | End: 2024-08-28

## 2024-08-28 RX ADMIN — HEPARIN 500 UNITS: 100 SYRINGE at 02:08

## 2024-08-28 RX ADMIN — Medication 10 ML: at 02:08

## 2024-08-30 ENCOUNTER — HOSPITAL ENCOUNTER (OUTPATIENT)
Dept: WOUND CARE | Facility: HOSPITAL | Age: 54
Discharge: HOME | End: 2024-08-30
Attending: PODIATRIST
Payer: MEDICARE

## 2024-08-30 VITALS
DIASTOLIC BLOOD PRESSURE: 91 MMHG | RESPIRATION RATE: 14 BRPM | TEMPERATURE: 98 F | SYSTOLIC BLOOD PRESSURE: 169 MMHG | HEART RATE: 91 BPM

## 2024-08-30 DIAGNOSIS — M86.10 ACUTE ON CHRONIC OSTEOMYELITIS: Primary | ICD-10-CM

## 2024-08-30 DIAGNOSIS — M86.60 ACUTE ON CHRONIC OSTEOMYELITIS: Primary | ICD-10-CM

## 2024-08-30 DIAGNOSIS — E11.621 DIABETIC ULCER OF LEFT MIDFOOT ASSOCIATED WITH TYPE 2 DIABETES MELLITUS, WITH BONE INVOLVEMENT WITHOUT EVIDENCE OF NECROSIS: ICD-10-CM

## 2024-08-30 DIAGNOSIS — L97.426 DIABETIC ULCER OF LEFT MIDFOOT ASSOCIATED WITH TYPE 2 DIABETES MELLITUS, WITH BONE INVOLVEMENT WITHOUT EVIDENCE OF NECROSIS: ICD-10-CM

## 2024-08-30 PROCEDURE — 11047 DBRDMT BONE EACH ADDL: CPT | Mod: HCNC,,, | Performed by: PODIATRIST

## 2024-08-30 PROCEDURE — 11044 DBRDMT BONE 1ST 20 SQ CM/<: CPT | Performed by: PODIATRIST

## 2024-08-30 PROCEDURE — 88305 TISSUE EXAM BY PATHOLOGIST: CPT | Mod: HCNC | Performed by: PATHOLOGY

## 2024-08-30 PROCEDURE — 88311 DECALCIFY TISSUE: CPT | Mod: HCNC | Performed by: PATHOLOGY

## 2024-08-30 PROCEDURE — 11044 DBRDMT BONE 1ST 20 SQ CM/<: CPT | Mod: HCNC,,, | Performed by: PODIATRIST

## 2024-08-30 PROCEDURE — 99499 UNLISTED E&M SERVICE: CPT | Mod: HCNC,,, | Performed by: PODIATRIST

## 2024-08-30 NOTE — PROGRESS NOTES
[facility]                Progress Note    Subjective:       Patient ID: Indio Ryder Jr. is a 54 y.o. male.    Chief Complaint: Wound Care, Diabetic Foot Ulcer, and Dressing Change    Follow Up wound care visit. Patient ambulated with assistance of knee scooter for LLE to [facility]. Prescribed CAM Boot on left Foot. Patient denies fever, chills, nausea, vomiting or diarrhea at present. Patient denies pain or discomfort to wound beds at present. Patient reports not having any issues with Tubigrip or dressing since last visit. Dressing appears intact, Tubigrip is well-placed. Dressing removed & wounds cleaned by nurse. Left Deltoid wound was cleaned and dressing changed per MD orders from last Nurse visit. DPM debrided bone and sent to pathology for analysis. Patient is still receiving home infusions daily of ABX therapy in RUE PICC line without complaint.     Changes made to dressing order; applied per DPM order. Patient will return to [facility] in 1 week to see DPM and nurse visit on Tuesday. Patient declined AVS, has MyChart.       Review of Systems      Objective:        Physical Exam    Vitals:    08/30/24 0915   BP: (!) 169/91   Pulse: 91   Resp: 14   Temp: 98.4 °F (36.9 °C)       Assessment:           ICD-10-CM ICD-9-CM   1. Acute on chronic osteomyelitis  M86.10 730.00    M86.60 730.10   2. Diabetic ulcer of left midfoot associated with type 2 diabetes mellitus, with bone involvement without evidence of necrosis  E11.621 250.80    L97.426 707.14            (Retired) Wound 04/08/22 1137 Diabetic Ulcer Left plantar;lateral Foot (Active)   04/08/22 1137    Pre-existing: Yes   Primary Wound Type: Diabetic ulc   Side: Left   Orientation: plantar;lateral   Location: Foot   Wound Number:    Ankle-Brachial Index:    Pulses:    Removal Indication and Assessment:    Wound Outcome:    (Retired) Wound Type:    (Retired) Wound Length (cm):    (Retired) Wound Width (cm):    (Retired)  Depth (cm):    Wound Description (Comments):    Removal Indications:    Wound Image   08/30/24 0930   Wound WDL ex 08/30/24 0930   Dressing Appearance Intact;Moist drainage 08/30/24 0930   Drainage Amount Large 08/30/24 0930   Drainage Characteristics/Odor Serosanguineous;No odor 08/30/24 0930   Appearance Pink;Moist;Tendon;Bone 08/30/24 0930   Tissue loss description Full thickness 08/30/24 0930   Black (%), Wound Tissue Color 0 % 08/30/24 0930   Red (%), Wound Tissue Color 95 % 08/30/24 0930   Yellow (%), Wound Tissue Color 5 % 08/30/24 0930   Periwound Area Pale white;Macerated 08/30/24 0930   Wound Edges Defined;Open 08/30/24 0930   Wound Length (cm) 6.6 cm 08/30/24 0930   Wound Width (cm) 3.1 cm 08/30/24 0930   Wound Depth (cm) 0.3 cm 08/30/24 0930   Wound Volume (cm^3) 6.138 cm^3 08/30/24 0930   Wound Surface Area (cm^2) 20.46 cm^2 08/30/24 0930   Tunneling (depth (cm)/location) 0 08/30/24 0930   Undermining (depth (cm)/location) 0 08/30/24 0930   Care Cleansed with:;Antimicrobial agent;Sterile normal saline;Debrided;Wound cleanser 08/30/24 0930   Dressing Changed 08/30/24 0930   Periwound Care Moisture barrier applied;Absorptive dressing applied 08/30/24 0930   Compression Tubular elasticized bandage 08/30/24 0930   Off Loading Football dressing;Off loading shoe 08/30/24 0930   Dressing Change Due 09/03/24 08/30/24 0930            Wound 06/25/24 Abscess Left upper Arm (Active)   06/25/24    Present on Original Admission: Y   Primary Wound Type: Abscess   Side: Left   Orientation: upper   Location: Arm   Wound Approximate Age at First Assessment (Weeks):    Wound Number:    Is this injury device related?:    Incision Type:    Closure Method:    Wound Description (Comments):    Type:    Additional Comments:    Ankle-Brachial Index:    Pulses:    Removal Indication and Assessment:    Wound Outcome:    Wound Image   08/30/24 0930   Dressing Appearance Intact;Moist drainage 08/30/24 0930   Drainage Amount Small  "08/30/24 0930   Drainage Characteristics/Odor Serosanguineous;Creamy;No odor 08/30/24 0930   Appearance Red;Bleeding 08/30/24 0930   Tissue loss description Full thickness 08/30/24 0930   Black (%), Wound Tissue Color 0 % 08/30/24 0930   Red (%), Wound Tissue Color 100 % 08/30/24 0930   Yellow (%), Wound Tissue Color 0 % 08/30/24 0930   Periwound Area Dry;Intact 08/30/24 0930   Wound Edges Defined;Open 08/30/24 0930   Wound Length (cm) 0.8 cm 08/30/24 0930   Wound Width (cm) 0.2 cm 08/30/24 0930   Wound Depth (cm) 0.6 cm 08/30/24 0930   Wound Volume (cm^3) 0.096 cm^3 08/30/24 0930   Wound Surface Area (cm^2) 0.16 cm^2 08/30/24 0930   Tunneling (depth (cm)/location) 0 08/30/24 0930   Undermining (depth (cm)/location) 0 08/30/24 0930   Care Cleansed with:;Antimicrobial agent;Sterile normal saline;Wound cleanser 08/30/24 0930   Dressing Changed 08/30/24 0930   Periwound Care Moisture barrier applied;Absorptive dressing applied 08/30/24 0930   Dressing Change Due 09/03/24 08/30/24 0930           Plan:              Tissue pathology and/or culture taken     [x] Yes      [] No  Sharp debridement performed                   [x] Yes       [] No  Labs ordered     [] Yes       [x] No  Imaging ordered    [] Yes      [x] No    Orders Placed This Encounter   Procedures    Change dressing     Wound Dressing Orders       Dressing change frequency twice a week (prn nurse visit)   Remove old dressing   Cleanse with: Hibiclens and Normal Saline then Vashe soak x 10 minutes     Moisturize dry skin on leg with Enlivaderm     Protect periwound with:  Gentian Violet, areas of maceration and hugging wound bed   Primary dressing: WOUND BASE: Endoform covering Tendon and area where bone is felt, then Silvercel (2 layers)   Secondary dressing:  Mextra 5" x 9" laid vertically.  Abram foam long x 2 (laid vertically)   Compression/Off-load: Open-Toe Football (cast padding x 2 rolls). Tubigrip, size D, double layer (calf size 34 cm) CAM Boot on " the affected foot      MD verbal order for Left Deltoid:   Clean wound with normal saline, then flush with 10 mL of Vashe.   Cavilon to periwound.   Apply [place] into wound bed. Cover with Tegafoam dressing     Change at Nurse Visit        Follow up in about 4 days (around 9/3/2024) for Nurse Visit.        "Subcutaneous, Bone and Hypergranulation    Devitalized tissue debrided:  Callus and Fibrin    Instruments:  Curette, Scissors and Forceps  Bleeding:  Moderate  Hemostasis Achieved: Yes  Method Used:  Pressure and Alginate  Patient tolerance:  Patient tolerated the procedure well with no immediate complications  Specimen Collected: Specimen sent to pathology      Patient will return to [facility] on Tuesday for nurse visit and in 1 week to see DPM.  Patient declined AVS, has MyChart.     Tissue pathology and/or culture taken     [x] Yes      [] No  Sharp debridement performed                   [x] Yes       [] No  Labs ordered     [] Yes       [x] No  Imaging ordered    [] Yes      [x] No    Orders Placed This Encounter   Procedures    Debridement     This order was created via procedure documentation     Standing Status:   Standing     Number of Occurrences:   1    Change dressing     Wound Dressing Orders       Dressing change frequency twice a week (prn nurse visit)   Remove old dressing   Cleanse with: Hibiclens and Normal Saline then Vashe soak x 10 minutes     Moisturize dry skin on leg with Enlivaderm     Protect periwound with:  Gentian Violet, areas of maceration and hugging wound bed   Primary dressing: WOUND BASE: Endoform covering Tendon and area where bone is felt, then Silvercel (2 layers)   Secondary dressing:  Mextra 5" x 9" laid vertically.  Abram foam long x 2 (laid vertically)   Compression/Off-load: Open-Toe Football (cast padding x 2 rolls). Tubigrip, size D, double layer (calf size 34 cm) CAM Boot on the affected foot      MD verbal order for Left Deltoid:   Clean wound with normal saline, then flush with 10 mL of Vashe.   Cavilon to periwound.   Apply [place] into wound bed. Cover with Tegafoam dressing     Change at Nurse Visit        Follow up in about 4 days (around 9/3/2024) for Nurse Visit.       "

## 2024-09-03 ENCOUNTER — HOSPITAL ENCOUNTER (OUTPATIENT)
Dept: WOUND CARE | Facility: HOSPITAL | Age: 54
Discharge: HOME | End: 2024-09-03
Payer: MEDICARE

## 2024-09-03 ENCOUNTER — TELEPHONE (OUTPATIENT)
Dept: ADMINISTRATIVE | Facility: CLINIC | Age: 54
End: 2024-09-03
Payer: MEDICARE

## 2024-09-03 VITALS
DIASTOLIC BLOOD PRESSURE: 64 MMHG | SYSTOLIC BLOOD PRESSURE: 119 MMHG | TEMPERATURE: 98 F | HEART RATE: 97 BPM | RESPIRATION RATE: 12 BRPM

## 2024-09-03 DIAGNOSIS — L02.414 ABSCESS OF LEFT ARM: ICD-10-CM

## 2024-09-03 DIAGNOSIS — M86.60 ACUTE ON CHRONIC OSTEOMYELITIS: Primary | ICD-10-CM

## 2024-09-03 DIAGNOSIS — M86.10 ACUTE ON CHRONIC OSTEOMYELITIS: Primary | ICD-10-CM

## 2024-09-03 DIAGNOSIS — E11.621 DIABETIC ULCER OF LEFT MIDFOOT ASSOCIATED WITH TYPE 2 DIABETES MELLITUS, WITH BONE INVOLVEMENT WITHOUT EVIDENCE OF NECROSIS: ICD-10-CM

## 2024-09-03 DIAGNOSIS — L97.426 DIABETIC ULCER OF LEFT MIDFOOT ASSOCIATED WITH TYPE 2 DIABETES MELLITUS, WITH BONE INVOLVEMENT WITHOUT EVIDENCE OF NECROSIS: ICD-10-CM

## 2024-09-03 PROCEDURE — 99213 OFFICE O/P EST LOW 20 MIN: CPT | Mod: HCNC

## 2024-09-03 NOTE — PROGRESS NOTES
[facility]  [address]  [id number]  Nurse Visit    Subjective:       Patient seen in clinic today. Patient ambulated with assistance of knee scooter for LLE to [facility]. Prescribed CAM Boot on left Foot. Patient denies fever, chills, nausea, vomiting or diarrhea at present. Patient denies pain or discomfort to wound beds at present. Dressings appear intact without strikethrough drainage. Dressings removed & wounds cleaned by nurse. Left Deltoid wound is measuring smaller overall. Redressed per MD order from last consult. Left Plantar Foot wound appears less deep since visit last Friday. Patient still has PICC line in [facility] with ABX infusion daily until 09/11/24 per ID. Dressing changed as ordered. Patient declined AVS, has MyChart.      Assessment:          (Retired) Wound 04/08/22 1137 Diabetic Ulcer Left plantar;lateral Foot (Active)   04/08/22 1137    Pre-existing: Yes   Primary Wound Type: Diabetic ulc   Side: Left   Orientation: plantar;lateral   Location: Foot   Wound Number:    Ankle-Brachial Index:    Pulses:    Removal Indication and Assessment:    Wound Outcome:    (Retired) Wound Type:    (Retired) Wound Length (cm):    (Retired) Wound Width (cm):    (Retired) Depth (cm):    Wound Description (Comments):    Removal Indications:    Wound Image   09/03/24 1610   Wound WDL ex 09/03/24 1610   Dressing Appearance Intact;Moist drainage 09/03/24 1610   Drainage Amount Moderate 09/03/24 1610   Drainage Characteristics/Odor Serosanguineous;No odor 09/03/24 1610   Appearance Pink;Tendon;Moist 09/03/24 1610   Tissue loss description Full thickness 09/03/24 1610   Black (%), Wound Tissue Color 0 % 09/03/24 1610   Red (%), Wound Tissue Color 96 % 09/03/24 1610   Yellow (%), Wound Tissue Color 4 % 09/03/24 1610   Periwound Area Pale white;Moist;Macerated 09/03/24 1610   Wound Edges Defined;Open 09/03/24 1610   Wound Length (cm) 6.5 cm 09/03/24 1610   Wound Width (cm) 3.1 cm 09/03/24  1610   Wound Depth (cm) 0.2 cm 09/03/24 1610   Wound Volume (cm^3) 4.03 cm^3 09/03/24 1610   Wound Surface Area (cm^2) 20.15 cm^2 09/03/24 1610   Tunneling (depth (cm)/location) 0 09/03/24 1610   Undermining (depth (cm)/location) 0 09/03/24 1610   Care Cleansed with:;Antimicrobial agent;Sterile normal saline 09/03/24 1610   Dressing Changed 09/03/24 1610   Periwound Care Moisture barrier applied;Absorptive dressing applied 09/03/24 1610   Compression Tubular elasticized bandage 09/03/24 1610   Off Loading Football dressing;Off loading shoe 09/03/24 1610   Dressing Change Due 09/06/24 09/03/24 1610            Wound 06/25/24 Abscess Left upper Arm (Active)   06/25/24    Present on Original Admission: Y   Primary Wound Type: Abscess   Side: Left   Orientation: upper   Location: Arm   Wound Approximate Age at First Assessment (Weeks):    Wound Number:    Is this injury device related?:    Incision Type:    Closure Method:    Wound Description (Comments):    Type:    Additional Comments:    Ankle-Brachial Index:    Pulses:    Removal Indication and Assessment:    Wound Outcome:    Wound Image   09/03/24 1610   Dressing Appearance Intact;Moist drainage 09/03/24 1610   Drainage Amount Small 09/03/24 1610   Drainage Characteristics/Odor Serosanguineous;Tan;No odor 09/03/24 1610   Appearance Pink;Moist 09/03/24 1610   Tissue loss description Full thickness 09/03/24 1610   Black (%), Wound Tissue Color 0 % 09/03/24 1610   Red (%), Wound Tissue Color 100 % 09/03/24 1610   Yellow (%), Wound Tissue Color 0 % 09/03/24 1610   Periwound Area Dry;Intact 09/03/24 1610   Wound Edges Defined;Open 09/03/24 1610   Wound Length (cm) 0.5 cm 09/03/24 1610   Wound Width (cm) 0.2 cm 09/03/24 1610   Wound Depth (cm) 0.5 cm 09/03/24 1610   Wound Volume (cm^3) 0.05 cm^3 09/03/24 1610   Wound Surface Area (cm^2) 0.1 cm^2 09/03/24 1610   Tunneling (depth (cm)/location) 0 09/03/24 1610   Undermining (depth (cm)/location) 0 09/03/24 1610   Care  "Cleansed with:;Antimicrobial agent;Sterile normal saline;Wound cleanser 09/03/24 1610   Dressing Changed 09/03/24 1610   Periwound Care Skin barrier film applied;Absorptive dressing applied 09/03/24 1610   Dressing Change Due 09/06/24 09/03/24 1610           Plan:     Wound Dressing Orders      Dressing change frequency twice a week (prn nurse visit)  Remove old dressing  Cleanse with: Hibiclens and Normal Saline then Vashe soak x 10 minutes    Moisturize dry skin on leg with Enlivaderm    Protect periwound with:  Gentian Violet, areas of maceration and hugging wound bed  Primary dressing: WOUND BASE: Endoform covering Tendon and area where bone is felt, then Silvercel (2 layers - secured with Steri-strips pulling lateral aspect towards wound bed)  Secondary dressing:  Mextra 5" x 9" laid vertically.  Abram foam long x 2 (laid vertically)  Compression/Off-load: Open-Toe Football (cast padding x 2 rolls). Tubigrip, size D, double layer (calf size 34 cm) CAM Boot on the affected foot     MD verbal order for Left Deltoid:  Clean wound with normal saline, then flush with 10 mL of Vashe.  Cavilon to periwound.  Apply [place] into wound bed. Cover with Tegafoam dressing             Follow up in about 3 days (around 9/6/2024) for Wound Care.        "

## 2024-09-03 NOTE — TELEPHONE ENCOUNTER
Called pt; informed pt I was calling to confirm his virtual EAWV on 9/5/24 at 8:00am and to see if he needed any help; pt stated he did not need any help and would complete e-pre check later today; pt informed to login 10 minutes prior to appt time

## 2024-09-04 ENCOUNTER — INFUSION (OUTPATIENT)
Dept: INFECTIOUS DISEASES | Facility: HOSPITAL | Age: 54
End: 2024-09-04
Attending: PODIATRIST
Payer: MEDICARE

## 2024-09-04 VITALS
HEIGHT: 73 IN | RESPIRATION RATE: 21 BRPM | DIASTOLIC BLOOD PRESSURE: 80 MMHG | BODY MASS INDEX: 27.99 KG/M2 | TEMPERATURE: 98 F | SYSTOLIC BLOOD PRESSURE: 129 MMHG | WEIGHT: 211.19 LBS | HEART RATE: 89 BPM | OXYGEN SATURATION: 100 %

## 2024-09-04 DIAGNOSIS — M86.60 ACUTE ON CHRONIC OSTEOMYELITIS: Primary | ICD-10-CM

## 2024-09-04 DIAGNOSIS — M86.10 ACUTE ON CHRONIC OSTEOMYELITIS: Primary | ICD-10-CM

## 2024-09-04 DIAGNOSIS — M86.672 CHRONIC OSTEOMYELITIS OF LEFT FOOT: ICD-10-CM

## 2024-09-04 LAB
ALBUMIN SERPL BCP-MCNC: 3.1 G/DL (ref 3.5–5.2)
ALP SERPL-CCNC: 193 U/L (ref 55–135)
ALT SERPL W/O P-5'-P-CCNC: 13 U/L (ref 10–44)
ANION GAP SERPL CALC-SCNC: 9 MMOL/L (ref 8–16)
AST SERPL-CCNC: 17 U/L (ref 10–40)
BASOPHILS # BLD AUTO: 0.04 K/UL (ref 0–0.2)
BASOPHILS NFR BLD: 0.7 % (ref 0–1.9)
BILIRUB SERPL-MCNC: 0.3 MG/DL (ref 0.1–1)
BUN SERPL-MCNC: 10 MG/DL (ref 6–20)
CALCIUM SERPL-MCNC: 9.5 MG/DL (ref 8.7–10.5)
CHLORIDE SERPL-SCNC: 106 MMOL/L (ref 95–110)
CK SERPL-CCNC: 103 U/L (ref 20–200)
CO2 SERPL-SCNC: 23 MMOL/L (ref 23–29)
CREAT SERPL-MCNC: 1.1 MG/DL (ref 0.5–1.4)
CRP SERPL-MCNC: 4 MG/L (ref 0–8.2)
DIFFERENTIAL METHOD BLD: ABNORMAL
EOSINOPHIL # BLD AUTO: 0.2 K/UL (ref 0–0.5)
EOSINOPHIL NFR BLD: 4.1 % (ref 0–8)
ERYTHROCYTE [DISTWIDTH] IN BLOOD BY AUTOMATED COUNT: 14 % (ref 11.5–14.5)
EST. GFR  (NO RACE VARIABLE): >60 ML/MIN/1.73 M^2
GLUCOSE SERPL-MCNC: 195 MG/DL (ref 70–110)
HCT VFR BLD AUTO: 38.8 % (ref 40–54)
HGB BLD-MCNC: 12 G/DL (ref 14–18)
IMM GRANULOCYTES # BLD AUTO: 0.02 K/UL (ref 0–0.04)
IMM GRANULOCYTES NFR BLD AUTO: 0.4 % (ref 0–0.5)
LYMPHOCYTES # BLD AUTO: 2.5 K/UL (ref 1–4.8)
LYMPHOCYTES NFR BLD: 45.5 % (ref 18–48)
MCH RBC QN AUTO: 28.7 PG (ref 27–31)
MCHC RBC AUTO-ENTMCNC: 30.9 G/DL (ref 32–36)
MCV RBC AUTO: 93 FL (ref 82–98)
MONOCYTES # BLD AUTO: 0.4 K/UL (ref 0.3–1)
MONOCYTES NFR BLD: 6.8 % (ref 4–15)
NEUTROPHILS # BLD AUTO: 2.4 K/UL (ref 1.8–7.7)
NEUTROPHILS NFR BLD: 42.5 % (ref 38–73)
NRBC BLD-RTO: 0 /100 WBC
PLATELET # BLD AUTO: 324 K/UL (ref 150–450)
PMV BLD AUTO: 10.1 FL (ref 9.2–12.9)
POTASSIUM SERPL-SCNC: 3.7 MMOL/L (ref 3.5–5.1)
PROT SERPL-MCNC: 7.7 G/DL (ref 6–8.4)
RBC # BLD AUTO: 4.18 M/UL (ref 4.6–6.2)
SODIUM SERPL-SCNC: 138 MMOL/L (ref 136–145)
WBC # BLD AUTO: 5.56 K/UL (ref 3.9–12.7)

## 2024-09-04 PROCEDURE — 36415 COLL VENOUS BLD VENIPUNCTURE: CPT | Mod: HCNC | Performed by: INTERNAL MEDICINE

## 2024-09-04 PROCEDURE — 36592 COLLECT BLOOD FROM PICC: CPT | Mod: HCNC

## 2024-09-04 PROCEDURE — 80053 COMPREHEN METABOLIC PANEL: CPT | Mod: HCNC | Performed by: INTERNAL MEDICINE

## 2024-09-04 PROCEDURE — A4216 STERILE WATER/SALINE, 10 ML: HCPCS | Mod: HCNC | Performed by: INTERNAL MEDICINE

## 2024-09-04 PROCEDURE — 82550 ASSAY OF CK (CPK): CPT | Mod: HCNC | Performed by: INTERNAL MEDICINE

## 2024-09-04 PROCEDURE — 85025 COMPLETE CBC W/AUTO DIFF WBC: CPT | Mod: HCNC | Performed by: INTERNAL MEDICINE

## 2024-09-04 PROCEDURE — 63600175 PHARM REV CODE 636 W HCPCS: Mod: HCNC | Performed by: INTERNAL MEDICINE

## 2024-09-04 PROCEDURE — 25000003 PHARM REV CODE 250: Mod: HCNC | Performed by: INTERNAL MEDICINE

## 2024-09-04 PROCEDURE — 86140 C-REACTIVE PROTEIN: CPT | Mod: HCNC | Performed by: INTERNAL MEDICINE

## 2024-09-04 RX ORDER — SODIUM CHLORIDE 0.9 % (FLUSH) 0.9 %
10 SYRINGE (ML) INJECTION
Status: COMPLETED | OUTPATIENT
Start: 2024-09-04 | End: 2024-09-04

## 2024-09-04 RX ORDER — SODIUM CHLORIDE 0.9 % (FLUSH) 0.9 %
10 SYRINGE (ML) INJECTION
OUTPATIENT
Start: 2024-09-11

## 2024-09-04 RX ORDER — HEPARIN 100 UNIT/ML
500 SYRINGE INTRAVENOUS
Status: COMPLETED | OUTPATIENT
Start: 2024-09-04 | End: 2024-09-04

## 2024-09-04 RX ORDER — HEPARIN 100 UNIT/ML
500 SYRINGE INTRAVENOUS
OUTPATIENT
Start: 2024-09-11

## 2024-09-04 RX ADMIN — Medication 10 ML: at 11:09

## 2024-09-04 RX ADMIN — HEPARIN 500 UNITS: 100 SYRINGE at 11:09

## 2024-09-04 NOTE — PROGRESS NOTES
Patient arrive to clinic for lab draw and dressing change per MD orders (CBC, CMP, CRP. CK).    SUELLEN PICC line, intact and able to flushed with NS without difficulty and with good blood return noted. Lab blood draw orders received and collected as ordered. Blood specimen sent to Lab via . SUELLEN PICC line double lumen flushed with heparin as ordered.    SUELLEN PICC line dressing changed per facility protocol; sterile technique and good handwashing observed. Patient tolerated well.    Limited head-to-toe assessment due to privacy issues and visit reason though the opportunity was given for patient to express any concerns.    Pt left in nad;  next appt for labs and dressing change given to pt;

## 2024-09-05 ENCOUNTER — TELEPHONE (OUTPATIENT)
Dept: ADMINISTRATIVE | Facility: CLINIC | Age: 54
End: 2024-09-05
Payer: MEDICARE

## 2024-09-05 LAB
FINAL PATHOLOGIC DIAGNOSIS: NORMAL
GROSS: NORMAL
Lab: NORMAL

## 2024-09-05 NOTE — TELEPHONE ENCOUNTER
Called pt; no answer; left message informing patient I was calling to confirm pt's virtual EAWV today at 8:00am and to see if pt needed any help with setting up for virtual visit or e-pre check and to login 10 minutes prior to scheduled appt; left my name & number for pt to return my call if pt had any questions or concerns; sent message through portal

## 2024-09-06 ENCOUNTER — HOSPITAL ENCOUNTER (OUTPATIENT)
Dept: WOUND CARE | Facility: HOSPITAL | Age: 54
Discharge: HOME | End: 2024-09-06
Attending: PODIATRIST
Payer: MEDICARE

## 2024-09-06 VITALS
DIASTOLIC BLOOD PRESSURE: 86 MMHG | RESPIRATION RATE: 12 BRPM | HEART RATE: 87 BPM | SYSTOLIC BLOOD PRESSURE: 139 MMHG | TEMPERATURE: 98 F

## 2024-09-06 DIAGNOSIS — M86.60 ACUTE ON CHRONIC OSTEOMYELITIS: Primary | ICD-10-CM

## 2024-09-06 DIAGNOSIS — L97.426 DIABETIC ULCER OF LEFT MIDFOOT ASSOCIATED WITH TYPE 2 DIABETES MELLITUS, WITH BONE INVOLVEMENT WITHOUT EVIDENCE OF NECROSIS: ICD-10-CM

## 2024-09-06 DIAGNOSIS — L02.414 ABSCESS OF LEFT ARM: ICD-10-CM

## 2024-09-06 DIAGNOSIS — E11.621 DIABETIC ULCER OF LEFT MIDFOOT ASSOCIATED WITH TYPE 2 DIABETES MELLITUS, WITH BONE INVOLVEMENT WITHOUT EVIDENCE OF NECROSIS: ICD-10-CM

## 2024-09-06 DIAGNOSIS — M86.10 ACUTE ON CHRONIC OSTEOMYELITIS: Primary | ICD-10-CM

## 2024-09-06 PROCEDURE — 11042 DBRDMT SUBQ TIS 1ST 20SQCM/<: CPT | Mod: HCNC,,, | Performed by: PODIATRIST

## 2024-09-06 PROCEDURE — 99499 UNLISTED E&M SERVICE: CPT | Mod: HCNC,,, | Performed by: PODIATRIST

## 2024-09-06 PROCEDURE — 11042 DBRDMT SUBQ TIS 1ST 20SQCM/<: CPT | Mod: HCNC | Performed by: PODIATRIST

## 2024-09-06 NOTE — PROGRESS NOTES
[facility]                Progress Note    Subjective:       Patient ID: Indio Ryder Jr. is a 54 y.o. male.    Chief Complaint: Wound Check, Diabetic Foot Ulcer, and Dressing Change    Follow Up wound care visit. Patient ambulated with assistance of knee scooter for LLE to [facility]. Prescribed CAM Boot on left Foot. Patient denies fever, chills, nausea, vomiting or diarrhea at present. Patient denies pain or discomfort to wound beds at present. Patient reports not having any issues with dressing since last visit. Dressings appear intact without strikethrough drainage. Dressings removed & wounds cleaned by nurse. Left Deltoid wound is continuing to show improvement with smaller measurements, dressing applied by MD order from last consult. Left Plantar Foot wound is measuring smaller in length.     No change to dressing order; applied per MD order. Patient will return to [facility] on Tuesday for a nurse visit and in 1 week to see DPM. Patient declined AVS, has MyChart.         Review of Systems      Objective:        Physical Exam    Vitals:    09/06/24 0905   BP: 139/86   Pulse: 87   Resp: 12   Temp: 97.8 °F (36.6 °C)       Assessment:           ICD-10-CM ICD-9-CM   1. Acute on chronic osteomyelitis  M86.10 730.00    M86.60 730.10   2. Diabetic ulcer of left midfoot associated with type 2 diabetes mellitus, with bone involvement without evidence of necrosis  E11.621 250.80    L97.426 707.14   3. Abscess of left arm  L02.414 682.3            (Retired) Wound 04/08/22 1137 Diabetic Ulcer Left plantar;lateral Foot (Active)   04/08/22 1137    Pre-existing: Yes   Primary Wound Type: Diabetic ulc   Side: Left   Orientation: plantar;lateral   Location: Foot   Wound Number:    Ankle-Brachial Index:    Pulses:    Removal Indication and Assessment:    Wound Outcome:    (Retired) Wound Type:    (Retired) Wound Length (cm):    (Retired) Wound Width (cm):    (Retired) Depth (cm):    Wound  Description (Comments):    Removal Indications:    Wound Image    09/06/24 0910   Wound WDL ex 09/06/24 0910   Dressing Appearance Intact;Moist drainage 09/06/24 0910   Drainage Amount Moderate 09/06/24 0910   Drainage Characteristics/Odor Serosanguineous;No odor 09/06/24 0910   Appearance Pink;Moist;Tendon 09/06/24 0910   Tissue loss description Full thickness 09/06/24 0910   Black (%), Wound Tissue Color 0 % 09/06/24 0910   Red (%), Wound Tissue Color 96 % 09/06/24 0910   Yellow (%), Wound Tissue Color 4 % 09/06/24 0910   Periwound Area Pale white;Moist;Macerated 09/06/24 0910   Wound Edges Defined;Open 09/06/24 0910   Wound Length (cm) 6.1 cm 09/06/24 0910   Wound Width (cm) 3.1 cm 09/06/24 0910   Wound Depth (cm) 0.2 cm 09/06/24 0910   Wound Volume (cm^3) 3.782 cm^3 09/06/24 0910   Wound Surface Area (cm^2) 18.91 cm^2 09/06/24 0910   Tunneling (depth (cm)/location) 0 09/06/24 0910   Undermining (depth (cm)/location) 0 09/06/24 0910   Care Cleansed with:;Antimicrobial agent;Sterile normal saline;Debrided 09/06/24 0910   Dressing Changed 09/06/24 0910   Periwound Care Moisture barrier applied;Absorptive dressing applied 09/06/24 0910   Compression Tubular elasticized bandage 09/06/24 0910   Off Loading Football dressing;Off loading shoe 09/06/24 0910   Dressing Change Due 09/10/24 09/06/24 0910            Wound 06/25/24 Abscess Left upper Arm (Active)   06/25/24    Present on Original Admission: Y   Primary Wound Type: Abscess   Side: Left   Orientation: upper   Location: Arm   Wound Approximate Age at First Assessment (Weeks):    Wound Number:    Is this injury device related?:    Incision Type:    Closure Method:    Wound Description (Comments):    Type:    Additional Comments:    Ankle-Brachial Index:    Pulses:    Removal Indication and Assessment:    Wound Outcome:    Wound Image   09/06/24 0910   Dressing Appearance Intact;Moist drainage 09/06/24 0910   Drainage Amount Small 09/06/24 0910   Drainage  "Characteristics/Odor Serosanguineous;Tan;No odor 09/06/24 0910   Appearance Red;Moist 09/06/24 0910   Tissue loss description Full thickness 09/06/24 0910   Black (%), Wound Tissue Color 0 % 09/06/24 0910   Red (%), Wound Tissue Color 100 % 09/06/24 0910   Yellow (%), Wound Tissue Color 0 % 09/06/24 0910   Periwound Area Dry;Intact 09/06/24 0910   Wound Edges Defined;Open 09/06/24 0910   Wound Length (cm) 0.3 cm 09/06/24 0910   Wound Width (cm) 0.2 cm 09/06/24 0910   Wound Depth (cm) 0.4 cm 09/06/24 0910   Wound Volume (cm^3) 0.024 cm^3 09/06/24 0910   Wound Surface Area (cm^2) 0.06 cm^2 09/06/24 0910   Tunneling (depth (cm)/location) 0 09/06/24 0910   Undermining (depth (cm)/location) 0 09/06/24 0910   Care Cleansed with:;Antimicrobial agent;Sterile normal saline;Wound cleanser 09/06/24 0910   Dressing Changed 09/06/24 0910   Periwound Care Skin barrier film applied;Absorptive dressing applied 09/06/24 0910   Dressing Change Due 09/10/24 09/06/24 0910           Plan:              Tissue pathology and/or culture taken     [] Yes      [x] No  Sharp debridement performed                   [x] Yes       [] No  Labs ordered     [] Yes       [x] No  Imaging ordered    [] Yes      [x] No    Orders Placed This Encounter   Procedures    Change dressing     Wound Dressing Orders      Dressing change frequency twice a week (prn nurse visit)  Remove old dressing  Cleanse with: Hibiclens and Normal Saline (may use Vinegar soak x 10 minutes at nurse visit if odorous)    Left Plantar Foot  Protect periwound with:  Gentian Violet, areas of maceration and hugging wound bed  Primary dressing: WOUND BASE: Endoform covering Tendon, then Silvercel (2 layers - secured with Steri-strips pulling lateral aspect towards wound bed)  Secondary dressing:  Mextra 5" x 9" laid vertically.  Abram foam long x 2 (laid vertically)  Compression/Off-load: Open-Toe Football (cast padding x 2 rolls). Tubigrip, size D, double layer (calf size 34.5 cm) " CAM Boot on the affected foot     MD verbal order for Left Deltoid:  Clean wound with normal saline, then flush with 5 mL of Vashe.  Cavilon to periwound.  Apply [place] into wound bed. Cover with Mepilex dressing (if drainage is scant, okay to change to Aquacel Ag with cast padding and stockinet to secure)    Return to clinic in 4 days for nurse visit.        Follow up in about 4 days (around 9/10/2024) for Nurse Visit.        "and Subcutaneous    Devitalized tissue debrided:  Callus and Fibrin    Instruments:  Curette  Bleeding:  Minimal  Hemostasis Achieved: Yes  Method Used:  Pressure and Silver Nitrate  Patient tolerance:  Patient tolerated the procedure well with no immediate complications      Tissue pathology and/or culture taken     [] Yes      [x] No  Sharp debridement performed                   [x] Yes       [] No  Labs ordered     [] Yes       [x] No  Imaging ordered    [] Yes      [x] No    Orders Placed This Encounter   Procedures    Debridement     This order was created via procedure documentation     Standing Status:   Standing     Number of Occurrences:   1    Change dressing     Wound Dressing Orders      Dressing change frequency twice a week (prn nurse visit)  Remove old dressing  Cleanse with: Hibiclens and Normal Saline (may use Vinegar soak x 10 minutes at nurse visit if odorous)    Left Plantar Foot  Protect periwound with:  Gentian Violet, areas of maceration and hugging wound bed  Primary dressing: WOUND BASE: Endoform covering Tendon, then Silvercel (2 layers - secured with Steri-strips pulling lateral aspect towards wound bed)  Secondary dressing:  Mextra 5" x 9" laid vertically.  Abram foam long x 2 (laid vertically)  Compression/Off-load: Open-Toe Football (cast padding x 2 rolls). Tubigrip, size D, double layer (calf size 34.5 cm) CAM Boot on the affected foot     MD verbal order for Left Deltoid:  Clean wound with normal saline, then flush with 5 mL of Vashe.  Cavilon to periwound.  Apply [place] into wound bed. Cover with Mepilex dressing (if drainage is scant, okay to change to Aquacel Ag with cast padding and stockinet to secure)    Return to clinic in 4 days for nurse visit.        Follow up in about 4 days (around 9/10/2024) for Nurse Visit.       "

## 2024-09-10 ENCOUNTER — HOSPITAL ENCOUNTER (OUTPATIENT)
Dept: WOUND CARE | Facility: HOSPITAL | Age: 54
Discharge: HOME | End: 2024-09-10
Attending: PODIATRIST
Payer: MEDICARE

## 2024-09-10 ENCOUNTER — OFFICE VISIT (OUTPATIENT)
Dept: INFECTIOUS DISEASES | Facility: CLINIC | Age: 54
End: 2024-09-10
Payer: MEDICARE

## 2024-09-10 ENCOUNTER — TELEPHONE (OUTPATIENT)
Dept: INFECTIOUS DISEASES | Facility: CLINIC | Age: 54
End: 2024-09-10
Payer: MEDICARE

## 2024-09-10 ENCOUNTER — INFUSION (OUTPATIENT)
Dept: INFECTIOUS DISEASES | Facility: HOSPITAL | Age: 54
End: 2024-09-10
Attending: PODIATRIST
Payer: MEDICARE

## 2024-09-10 VITALS
TEMPERATURE: 98 F | WEIGHT: 219.94 LBS | OXYGEN SATURATION: 98 % | DIASTOLIC BLOOD PRESSURE: 66 MMHG | RESPIRATION RATE: 20 BRPM | HEIGHT: 73 IN | BODY MASS INDEX: 29.15 KG/M2 | HEART RATE: 92 BPM | SYSTOLIC BLOOD PRESSURE: 160 MMHG

## 2024-09-10 VITALS
RESPIRATION RATE: 14 BRPM | DIASTOLIC BLOOD PRESSURE: 76 MMHG | HEART RATE: 98 BPM | TEMPERATURE: 98 F | SYSTOLIC BLOOD PRESSURE: 124 MMHG

## 2024-09-10 DIAGNOSIS — M86.10 ACUTE ON CHRONIC OSTEOMYELITIS: Primary | ICD-10-CM

## 2024-09-10 DIAGNOSIS — M86.60 ACUTE ON CHRONIC OSTEOMYELITIS: Primary | ICD-10-CM

## 2024-09-10 DIAGNOSIS — L02.414 ABSCESS OF LEFT ARM: ICD-10-CM

## 2024-09-10 DIAGNOSIS — M86.672 CHRONIC OSTEOMYELITIS OF LEFT FOOT: ICD-10-CM

## 2024-09-10 DIAGNOSIS — E11.621 DIABETIC ULCER OF LEFT MIDFOOT ASSOCIATED WITH TYPE 2 DIABETES MELLITUS, WITH BONE INVOLVEMENT WITHOUT EVIDENCE OF NECROSIS: ICD-10-CM

## 2024-09-10 DIAGNOSIS — L97.426 DIABETIC ULCER OF LEFT MIDFOOT ASSOCIATED WITH TYPE 2 DIABETES MELLITUS, WITH BONE INVOLVEMENT WITHOUT EVIDENCE OF NECROSIS: ICD-10-CM

## 2024-09-10 DIAGNOSIS — L97.426 DIABETIC ULCER OF LEFT MIDFOOT ASSOCIATED WITH TYPE 2 DIABETES MELLITUS, WITH BONE INVOLVEMENT WITHOUT EVIDENCE OF NECROSIS: Primary | ICD-10-CM

## 2024-09-10 DIAGNOSIS — E11.621 DIABETIC ULCER OF LEFT MIDFOOT ASSOCIATED WITH TYPE 2 DIABETES MELLITUS, WITH BONE INVOLVEMENT WITHOUT EVIDENCE OF NECROSIS: Primary | ICD-10-CM

## 2024-09-10 LAB
ALBUMIN SERPL BCP-MCNC: 3 G/DL (ref 3.5–5.2)
ALP SERPL-CCNC: 201 U/L (ref 55–135)
ALT SERPL W/O P-5'-P-CCNC: 13 U/L (ref 10–44)
ANION GAP SERPL CALC-SCNC: 8 MMOL/L (ref 8–16)
AST SERPL-CCNC: 12 U/L (ref 10–40)
BASOPHILS # BLD AUTO: 0.02 K/UL (ref 0–0.2)
BASOPHILS NFR BLD: 0.4 % (ref 0–1.9)
BILIRUB SERPL-MCNC: 0.6 MG/DL (ref 0.1–1)
BUN SERPL-MCNC: 7 MG/DL (ref 6–20)
CALCIUM SERPL-MCNC: 9.2 MG/DL (ref 8.7–10.5)
CHLORIDE SERPL-SCNC: 103 MMOL/L (ref 95–110)
CK SERPL-CCNC: 88 U/L (ref 20–200)
CO2 SERPL-SCNC: 26 MMOL/L (ref 23–29)
CREAT SERPL-MCNC: 0.9 MG/DL (ref 0.5–1.4)
CRP SERPL-MCNC: 6.5 MG/L (ref 0–8.2)
DIFFERENTIAL METHOD BLD: ABNORMAL
EOSINOPHIL # BLD AUTO: 0.1 K/UL (ref 0–0.5)
EOSINOPHIL NFR BLD: 2.2 % (ref 0–8)
ERYTHROCYTE [DISTWIDTH] IN BLOOD BY AUTOMATED COUNT: 13.9 % (ref 11.5–14.5)
EST. GFR  (NO RACE VARIABLE): >60 ML/MIN/1.73 M^2
GLUCOSE SERPL-MCNC: 331 MG/DL (ref 70–110)
HCT VFR BLD AUTO: 35.5 % (ref 40–54)
HGB BLD-MCNC: 10.7 G/DL (ref 14–18)
IMM GRANULOCYTES # BLD AUTO: 0.01 K/UL (ref 0–0.04)
IMM GRANULOCYTES NFR BLD AUTO: 0.2 % (ref 0–0.5)
LYMPHOCYTES # BLD AUTO: 1.8 K/UL (ref 1–4.8)
LYMPHOCYTES NFR BLD: 31.8 % (ref 18–48)
MCH RBC QN AUTO: 28.1 PG (ref 27–31)
MCHC RBC AUTO-ENTMCNC: 30.1 G/DL (ref 32–36)
MCV RBC AUTO: 93 FL (ref 82–98)
MONOCYTES # BLD AUTO: 0.4 K/UL (ref 0.3–1)
MONOCYTES NFR BLD: 8 % (ref 4–15)
NEUTROPHILS # BLD AUTO: 3.2 K/UL (ref 1.8–7.7)
NEUTROPHILS NFR BLD: 57.4 % (ref 38–73)
NRBC BLD-RTO: 0 /100 WBC
PLATELET # BLD AUTO: 264 K/UL (ref 150–450)
PMV BLD AUTO: 10.5 FL (ref 9.2–12.9)
POTASSIUM SERPL-SCNC: 3.8 MMOL/L (ref 3.5–5.1)
PROT SERPL-MCNC: 7.2 G/DL (ref 6–8.4)
RBC # BLD AUTO: 3.81 M/UL (ref 4.6–6.2)
SODIUM SERPL-SCNC: 137 MMOL/L (ref 136–145)
WBC # BLD AUTO: 5.51 K/UL (ref 3.9–12.7)

## 2024-09-10 PROCEDURE — 82550 ASSAY OF CK (CPK): CPT | Mod: HCNC | Performed by: INTERNAL MEDICINE

## 2024-09-10 PROCEDURE — 99213 OFFICE O/P EST LOW 20 MIN: CPT | Mod: HCNC

## 2024-09-10 PROCEDURE — 80053 COMPREHEN METABOLIC PANEL: CPT | Mod: HCNC | Performed by: INTERNAL MEDICINE

## 2024-09-10 PROCEDURE — 36415 COLL VENOUS BLD VENIPUNCTURE: CPT | Mod: HCNC | Performed by: INTERNAL MEDICINE

## 2024-09-10 PROCEDURE — 86140 C-REACTIVE PROTEIN: CPT | Mod: HCNC | Performed by: INTERNAL MEDICINE

## 2024-09-10 PROCEDURE — 85025 COMPLETE CBC W/AUTO DIFF WBC: CPT | Mod: HCNC | Performed by: INTERNAL MEDICINE

## 2024-09-10 PROCEDURE — 63600175 PHARM REV CODE 636 W HCPCS: Mod: HCNC | Performed by: INTERNAL MEDICINE

## 2024-09-10 PROCEDURE — 99214 OFFICE O/P EST MOD 30 MIN: CPT | Mod: HCNC,95,, | Performed by: INTERNAL MEDICINE

## 2024-09-10 PROCEDURE — 3046F HEMOGLOBIN A1C LEVEL >9.0%: CPT | Mod: HCNC,CPTII,95, | Performed by: INTERNAL MEDICINE

## 2024-09-10 RX ORDER — SODIUM CHLORIDE 0.9 % (FLUSH) 0.9 %
10 SYRINGE (ML) INJECTION
OUTPATIENT
Start: 2024-09-11

## 2024-09-10 RX ORDER — HEPARIN 100 UNIT/ML
500 SYRINGE INTRAVENOUS
OUTPATIENT
Start: 2024-09-11

## 2024-09-10 RX ORDER — SODIUM CHLORIDE 0.9 % (FLUSH) 0.9 %
10 SYRINGE (ML) INJECTION
Status: DISCONTINUED | OUTPATIENT
Start: 2024-09-10 | End: 2024-09-10 | Stop reason: HOSPADM

## 2024-09-10 RX ORDER — HEPARIN 100 UNIT/ML
500 SYRINGE INTRAVENOUS
Status: COMPLETED | OUTPATIENT
Start: 2024-09-10 | End: 2024-09-10

## 2024-09-10 RX ADMIN — HEPARIN 500 UNITS: 100 SYRINGE at 12:09

## 2024-09-10 NOTE — PROGRESS NOTES
"[facility]  [address]   CHARLOTTE Arriaga  [id number]  Nurse Visit    Subjective:       Patient seen in clinic today. Patient ambulated unaided to [facility]. Prescribed CAM Boot on left Foot. He states he did not bring his knee scooter "because it was raining". Patient denies fever, chills, nausea, vomiting or diarrhea at present. Patient denies pain or discomfort to wound beds at present. He states he was seen by ID today and they recommend for him to keep PICC line in RUE to infuse ABX therapy for at least another 2 weeks. Dressings appear intact without strikethrough drainage. Dressings removed & wounds cleaned by nurse. Left Deltoid wound is pinpoint in size, consulted with on duty MD and applied dressed as per verbal order below. Left Plantar Foot wound appears pink and moist, can feel a small area of bone (not protruding) near distal aspect of wound bed. Patient declined AVS, has MyChart.  Dressing changed as ordered.      Assessment:          (Retired) Wound 04/08/22 1137 Diabetic Ulcer Left plantar;lateral Foot (Active)   04/08/22 1137    Pre-existing: Yes   Primary Wound Type: Diabetic [facility]   Side: Left   Orientation: plantar;lateral   Location: Foot   Wound Number:    Ankle-Brachial Index:    Pulses:    Removal Indication and Assessment:    Wound Outcome:    (Retired) Wound Type:    (Retired) Wound Length (cm):    (Retired) Wound Width (cm):    (Retired) Depth (cm):    Wound Description (Comments):    Removal Indications:    Wound Image   09/10/24 1535   Wound WDL ex 09/10/24 1535   Dressing Appearance Intact;Moist drainage 09/10/24 1535   Drainage Amount Moderate 09/10/24 1535   Drainage Characteristics/Odor Serosanguineous 09/10/24 1535   Appearance Pink;Moist;Tendon 09/10/24 1535   Tissue loss description Full thickness 09/10/24 1535   Black (%), Wound Tissue Color 0 % 09/10/24 1535   Red (%), Wound Tissue Color 100 % 09/10/24 1535   Yellow (%), Wound Tissue Color 0 % 09/10/24 1535 "   Periwound Area Pale white;Moist;Macerated 09/10/24 1535   Wound Edges Defined;Open 09/10/24 1535   Wound Length (cm) 6.2 cm 09/10/24 1535   Wound Width (cm) 2.8 cm 09/10/24 1535   Wound Depth (cm) 0.2 cm 09/10/24 1535   Wound Volume (cm^3) 3.472 cm^3 09/10/24 1535   Wound Surface Area (cm^2) 17.36 cm^2 09/10/24 1535   Tunneling (depth (cm)/location) 0 09/10/24 1535   Undermining (depth (cm)/location) 0 09/10/24 1535   Care Cleansed with:;Antimicrobial agent;Sterile normal saline;Wound cleanser 09/10/24 1535   Dressing Changed 09/10/24 1535   Periwound Care Moisture barrier applied;Absorptive dressing applied 09/10/24 1535   Compression Tubular elasticized bandage 09/10/24 1535   Off Loading Football dressing;Off loading shoe 09/10/24 1535   Dressing Change Due 09/13/24 09/10/24 1535            Wound 06/25/24 Abscess Left upper Arm (Active)   06/25/24    Present on Original Admission: Y   Primary Wound Type: Abscess   Side: Left   Orientation: upper   Location: Arm   Wound Approximate Age at First Assessment (Weeks):    Wound Number:    Is this injury device related?:    Incision Type:    Closure Method:    Wound Description (Comments):    Type:    Additional Comments:    Ankle-Brachial Index:    Pulses:    Removal Indication and Assessment:    Wound Outcome:    Wound Image   09/10/24 1535   Dressing Appearance Intact;Moist drainage 09/10/24 1535   Drainage Amount Scant 09/10/24 1535   Drainage Characteristics/Odor Serosanguineous;No odor 09/10/24 1535   Appearance Pink;Moist 09/10/24 1535   Tissue loss description Partial thickness 09/10/24 1535   Black (%), Wound Tissue Color 0 % 09/10/24 1535   Red (%), Wound Tissue Color 100 % 09/10/24 1535   Yellow (%), Wound Tissue Color 0 % 09/10/24 1535   Periwound Area Dry 09/10/24 1535   Wound Edges Defined;Open 09/10/24 1535   Wound Length (cm) 0.1 cm 09/10/24 1535   Wound Width (cm) 0.1 cm 09/10/24 1535   Wound Depth (cm) 0.01 cm 09/10/24 1535   Wound Volume (cm^3)  "0.0001 cm^3 09/10/24 1535   Wound Surface Area (cm^2) 0.01 cm^2 09/10/24 1535   Tunneling (depth (cm)/location) 0 09/10/24 1535   Undermining (depth (cm)/location) 0 09/10/24 1535   Care Cleansed with:;Antimicrobial agent;Sterile normal saline;Wound cleanser 09/10/24 1535   Dressing Changed 09/10/24 1535   Periwound Care Skin barrier film applied;Absorptive dressing applied 09/10/24 1535   Dressing Change Due 09/13/24 09/10/24 1535           Plan:     Wound Dressing Orders      Dressing change frequency twice a week (prn nurse visit)  Remove old dressing  Cleanse with: Hibiclens and Normal Saline (may use Vinegar soak x 10 minutes at nurse visit if odorous)     Left Plantar Foot  Protect periwound with:  Gentian Violet, areas of maceration and hugging wound bed  Primary dressing: WOUND BASE: Endoform covering Tendon, then Silvercel (2 layers - secured with Steri-strips pulling lateral aspect towards wound bed)  Secondary dressing:  Mextra 5" x 9" laid vertically.  Abram foam long x 2 (laid vertically)  Compression/Off-load: Open-Toe Football (cast padding x 2 rolls). Tubigrip, size D, double layer (calf size 34.5 cm) CAM Boot on the affected foot     MD verbal order for Left Deltoid:  Clean wound with normal saline, then flush with 5 mL of Vashe.  Cavilon to periwound.  Apply Aquacel Ag over wound bed then secure with cast padding and stockinet to secure          Follow up in about 3 days (around 9/13/2024) for Wound Care.          "

## 2024-09-10 NOTE — TELEPHONE ENCOUNTER
Spoke with Matt from [facility] and gave the verbal order to extend pt Iv abx for 2 weeks per Dr. Tan.

## 2024-09-10 NOTE — PROGRESS NOTES
The patient location is: home  The chief complaint leading to consultation is: osteo L heel    Visit type: audiovisual    Face to Face time with patient: 15 mins  50 minutes of total time spent on the encounter, which includes face to face time and non-face to face time preparing to see the patient (eg, review of tests), Obtaining and/or reviewing separately obtained history, Documenting clinical information in the electronic or other health record, Independently interpreting results (not separately reported) and communicating results to the patient/family/caregiver, or Care coordination (not separately reported).         Each patient to whom he or she provides medical services by telemedicine is:  (1) informed of the relationship between the physician and patient and the respective role of any other health care provider with respect to management of the patient; and (2) notified that he or she may decline to receive medical services by telemedicine and may withdraw from such care at any time.    Notes:     Infectious Disease Clinic Note  09/11/23      Subjective:       Patient ID: Indio Ryder Jr. is a 54 y.o. male being seen for a followup visit.    Chief Complaint: No chief complaint on file.    HPI     Mr. Ryder is a 51 y/o M with hx DM and numerous episodes of foot osteo presents for f/up of Lt foot chronic osteomyelitis.    Interval hx 9/10/24:  Reports tolerating dapto. Denies issues with picc. States L heel wound is improving. He is following up closely with wound care and podiatry. Denies signs or symptoms of systemic infection.     Interval hx 7/24/24:  Pt with chronic refractory osteomyelitis of the left plantar midfoot. Despite being on oral antibiotics and antibiotic powder he continues to regress and have exposed bone per podiatry. Denies signs or symptoms of systemic infection. Reports med adherence to augmentin. Recent bone cx 7/5 w/o growth. Recent images of foot wound reviewed.    Pt  states wound was dry and looking better when he went for wound care yesterday. He thinks the increased drainage is due to the wrapping and not necessarily the infection. He would like to wait until his next visit with podiatry/ wound care before agreeing to IV abx. Denies signs and symptoms of systemic infection.    Additionally reports hx of lt shoulder abscess for which he saw gen sx on 7/12 who lanced abscess. Notes he is no lomnger having purulent drainage or erythema. Completed course of bactrim.             Additional hx :  Previously seen on 10/11/2023 by Damien Richey. He was prescribed a 6 month course of augmentin for chronic osteo, but pt reported he had stopped taking med. Bone Biopsy 5/17/24 revealed chronic osteomyelitis; bone cx grew enterococcus faecalis.    A previous wound cx 4/2/24 grew pseudomonas and klebsiella. On 5/17 he was prescribed levofloxacin 750mg - taking every other day.      Bone cx 7/2023 grew staph lugdunensis and e faecalis    Recent Imaging  6/24 arterial US:   Peripheral arterial disease with monophasic and biphasic waveforms in the left lower extremity.  No focal hemodynamically significant arterial stenosis        Family History   Adopted: Yes   Problem Relation Name Age of Onset    Hypertension Mother      Cancer Mother          breast    Diabetes Mother      Hypertension Father      Cataracts Father      Heart disease Father          mi    Diabetes Sister Michelle     No Known Problems Brother      Heart disease Sister Manuela         MI    No Known Problems Sister      Hypertension Maternal Aunt      No Known Problems Maternal Uncle      No Known Problems Paternal Aunt      No Known Problems Paternal Uncle      No Known Problems Maternal Grandmother      No Known Problems Maternal Grandfather      No Known Problems Paternal Grandmother      No Known Problems Paternal Grandfather      Amblyopia Neg Hx      Blindness Neg Hx      Glaucoma Neg Hx      Macular degeneration Neg  Hx      Retinal detachment Neg Hx      Strabismus Neg Hx      Stroke Neg Hx      Thyroid disease Neg Hx       Social History     Socioeconomic History    Marital status: Single    Number of children: 0   Occupational History    Occupation: Retired     Employer: [employer]   Tobacco Use    Smoking status: Never    Smokeless tobacco: Never   Substance and Sexual Activity    Alcohol use: No    Drug use: No    Sexual activity: Yes     Partners: Female     Birth control/protection: Condom     Social Determinants of Health     Financial Resource Strain: Low Risk  (7/2/2024)    Overall Financial Resource Strain (CARDIA)     Difficulty of Paying Living Expenses: Not hard at all   Food Insecurity: No Food Insecurity (7/2/2024)    Hunger Vital Sign     Worried About Running Out of Food in the Last Year: Never true     Ran Out of Food in the Last Year: Never true   Transportation Needs: No Transportation Needs (7/13/2023)    PRAPARE - Transportation     Lack of Transportation (Medical): No     Lack of Transportation (Non-Medical): No   Physical Activity: Unknown (7/2/2024)    Exercise Vital Sign     Days of Exercise per Week: 0 days   Recent Concern: Physical Activity - Inactive (7/2/2024)    Exercise Vital Sign     Days of Exercise per Week: 0 days     Minutes of Exercise per Session: 0 min   Stress: No Stress Concern Present (7/13/2023)    [employer] - Occupational Stress Questionnaire     Feeling of Stress : Not at all   Housing Stability: Low Risk  (7/13/2023)    Housing Stability Vital Sign     Unable to Pay for Housing in the Last Year: No     Number of Places Lived in the Last Year: 1     Unstable Housing in the Last Year: No     Past Surgical History:   Procedure Laterality Date    COLONOSCOPY Right 1/19/2022    Procedure: COLONOSCOPY;  Surgeon: Tj Haile MD;  Location: [address];  Service: Endoscopy;  Laterality: Right;  previous order un-usable/poor prep 1/18/22  RAPID  COVID test arrival 12:20  instructions handed to pt-     COLONOSCOPY N/A 3/21/2022    Procedure: COLONOSCOPY;  Surgeon: Sheng Haque MD;  Location: [facility] ENDO (2ND FLR);  Service: Endoscopy;  Laterality: N/A;  Colonoscopy with AES for hepatix flexure polyp removal, 2nd floor  Pt is fully vaccinated-DS  Pt prescribed Plavix by Dr. Stahl in Jan. 2022, however pt states that he never started taking it-DS  3/16/22-Instructions sent via portal-DS    COLONOSCOPY N/A 9/13/2023    Procedure: COLONOSCOPY;  Surgeon: Erik Stout MD;  Location: [facility] ENDO (4TH FLR);  Service: Colon and Rectal;  Laterality: N/A;  Referred by Dr. Stout, [facility], [facility], [place]    DEBRIDEMENT OF FOOT Left 7/14/2019    Procedure: DEBRIDEMENT, FOOT, with left 5th ray amputation;  Surgeon: Sis Hickman DPM;  Location: [facility] OR 2ND FLR;  Service: Podiatry;  Laterality: Left;    DEBRIDEMENT OF FOOT Left 7/17/2019    Procedure: DEBRIDEMENT, FOOT with leftt 5th ray partial amputation with Neox Graft;  Surgeon: Mai Burrell DPM;  Location: [facility] OR 2ND FLR;  Service: Podiatry;  Laterality: Left;  t    DEBRIDEMENT OF FOOT Bilateral 4/29/2021    Procedure: DEBRIDEMENT, FOOT;  Surgeon: Mai Burrell DPM;  Location: [facility] OR 1ST FLR;  Service: Podiatry;  Laterality: Bilateral;  Graft application    DEBRIDEMENT OF FOOT Left 7/31/2023    Procedure: DEBRIDEMENT, FOOT;  Surgeon: Marivel Peterson DPM;  Location: [facility] OR 2ND FLR;  Service: Podiatry;  Laterality: Left;    TOE AMPUTATION Right 06/05/2015    TOE AMPUTATION Right 01/19/2017    XI ROBOTIC COLECTOMY, RIGHT N/A 4/25/2022    Procedure: XI ROBOTIC COLECTOMY, RIGHT (lithotomy, eras low);  Surgeon: Erik Stout MD;  Location: [facility] OR 2ND FLR;  Service: Colon and Rectal;  Laterality: N/A;  Paruch to [place]    XI ROBOTIC COLECTOMY, RIGHT  4/25/2022    Procedure: ;  Surgeon: Erik Stout MD;  Location: [facility] OR [address] [place] [address];  Service: Colon and Rectal;;  "      Patient's Medications   New Prescriptions    No medications on file   Previous Medications    AMOXICILLIN-CLAVULANATE 875-125MG (AUGMENTIN) 875-125 MG PER TABLET    Take 1 tablet by mouth 2 (two) times a day.    ATORVASTATIN (LIPITOR) 80 MG TABLET    Take 1 tablet (80 mg total) by mouth once daily.    DAPTOMYCIN IV    Inject 8 mg PE/kg/day into the vein once daily.    EMPAGLIFLOZIN (JARDIANCE) 25 MG TABLET    Take 1 tablet (25 mg total) by mouth once daily.    INSULIN ASPART U-100 (NOVOLOG) 100 UNIT/ML (3 ML) INPN PEN    Inject if sugar is > 180 up to 4x a day , 180-230+2, 231-280+4, 281-330+6, 331-380+8, >380+10. Max daily 40 units    INSULIN GLARGINE U-300 CONC (TOUJEO MAX U-300 SOLOSTAR) 300 UNIT/ML (3 ML) INSULIN PEN    Inject 26 to 32 units under the skin at night    ONETOUCH DELICA LANCETS 33 GAUGE MISWuzzuf        ONETOUCH ULTRA2 KIT        PEN NEEDLE, DIABETIC (BD SASCHA 2ND GEN PEN NEEDLE) 32 GAUGE X 5/32" NDLE    Use 4 times a day   Modified Medications    No medications on file   Discontinued Medications    No medications on file       Patient Active Problem List    Diagnosis Date Noted    Abscess of left arm 07/02/2024    Blister (nonthermal), left foot, sequela 02/16/2024    Blister (nonthermal), left foot, initial encounter 01/26/2024    Equinus deformity of both feet 11/17/2023    H/O amputation of lesser toe, right 11/17/2023    Right great toe amputee 11/17/2023    Chronic osteomyelitis of left foot 10/20/2023    Type II diabetes mellitus with neurological manifestations 09/29/2023    Eversion deformity of foot, left 09/15/2023    Hx of amputation of lesser toe, left 09/15/2023    Pre-ulcerative calluses 06/30/2023    Chronic osteomyelitis of right foot 12/09/2022    Diabetic ulcer of right foot associated with type 2 diabetes mellitus, with fat layer exposed 04/26/2022    History of colon cancer 04/12/2022    Acute on chronic osteomyelitis 04/29/2021    Diabetic ulcer of left foot 04/28/2021    " "Septic arthritis of left foot 04/28/2021    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 edema, with long-term current use of insulin 03/23/2021    Chronic left shoulder pain 07/30/2020    Allergic reaction due to antibacterial drug 04/05/2020    Subacute osteomyelitis of left foot 03/11/2020    Hypertensive retinopathy, bilateral 01/28/2020    Diabetic ulcer of left midfoot associated with type 2 diabetes mellitus, with bone involvement without evidence of necrosis 08/02/2019    Diabetic ulcer of right midfoot associated with type 2 diabetes mellitus, with necrosis of bone 07/12/2019    Neck pain 11/08/2017    Hypokalemia 05/30/2017    Actinomyces infection 01/17/2017     Right foot      Type 2 diabetes mellitus with hyperglycemia, with long-term current use of insulin 05/03/2016    Mixed hyperlipidemia 08/12/2014    Essential hypertension 06/06/2013       Review of Systems   Review of Systems   Constitutional:  Negative for chills, fever and malaise/fatigue.   Respiratory:  Negative for cough and shortness of breath.    Cardiovascular:  Negative for chest pain and orthopnea.   Gastrointestinal:  Negative for abdominal pain, diarrhea and vomiting.   Genitourinary:  Negative for dysuria.   Musculoskeletal:  Negative for back pain and myalgias.        L foot wound   Skin:         L shoulder erythema+  L heel ulcer+   Neurological:  Negative for sensory change and weakness.           Objective:      There were no vitals taken for this visit.  Estimated body mass index is 27.86 kg/m² as calculated from the following:    Height as of 9/4/24: 6' 1" (1.854 m).    Weight as of 9/4/24: 95.8 kg (211 lb 3.2 oz).    Physical Exam  Constitutional:       Appearance: Normal appearance.   Pulmonary:      Effort: Pulmonary effort is normal.   Neurological:      Mental Status: He is alert.     Images reviewed.      Assessment:         1. Diabetic ulcer of left midfoot " associated with type 2 diabetes mellitus, with bone involvement without evidence of necrosis        2. Chronic osteomyelitis of left foot                    Plan:         Diabetic ulcer of left midfoot associated with type 2 diabetes mellitus, with fat layer exposed  53y/o M with hx of LLE DM foot ulcers c/b chronic osteo of the L midfoot s/p numerous courses of IV and PO antibiotics. bone cx from 5/17/24 again growing e faecalis (amp (S)),path with chronic osteo. Suspect due to nonadherence not antibiotic failure. Arterial US w/o evidence of significant stenosis. Started on dapto on 7/31 after worsening L heel osteo on po augmentin.     Bone cx have grown:   staph lug/ e faacalis (7/2023). E faecalis (5/17/24). (7/5/24) ngtd  Repeat bone biopsy 8/30 positive for acute osteomyelitis.   Recent labs reviewed. CRP now wnl.    Plan:   --continue dapto 8mg/kg/day   --f/u with podiatry / woundcare  --continue to monitor with weekly cmp, crp, cpk  --plan discussed with Dr. Peterson; will plan to extend abx given ongoing osteo (EOT 9/25). Have asked to repeat bone cx if possible to assess if we need to broaden coverage.            Rtc in 2-3 wks  Maria Dolores Tan MD  Infectious Disease     Total professional time spent for the encounter: 50 minutes  Time was spent preparing to see the patient, reviewing results of prior testing, obtaining and/or reviewing separately obtained history, performing a medically appropriate examination and interview, counseling and educating the patient/family, ordering medications/tests/procedures, referring and communicating with other health care professionals, documenting clinical information in the electronic health record, and independently interpreting results.     Visit today included increased complexity associated with the care of the episodic problem osteomyelitis of L foot addressed and managing the longitudinal care of the patient due to the serious and/or complex managed  problem(s) osteomyelitis left foot, wound infection.

## 2024-09-10 NOTE — ED PROVIDER NOTES
Encounter Date: 1/12/2017       History     Chief Complaint   Patient presents with    Diabetic Foot Ulcer     pt to triage ambulatory; pt reports saw podiatrist yesterday and was told to come to er for iv abx for osteomyelitis; pt reports 3 years of an ongoing foot ulcer to right foot and has been on abx for a while; pt is wearing a cast shoe to right foot     Review of patient's allergies indicates:  No Known Allergies  HPI Comments: 45 yo M with history of HTN, DM and diabetic foot ulcer here with worsening R foot ulceration. Was sent in by podiatrist for infection. Denies fevers or chills. Denies new trauma or injury. Denies SOB    Patient is a 46 y.o. male presenting with the following complaint: foot injury. The history is provided by the patient.   Foot Injury    There was no injury mechanism. The pain is present in the right foot and right toes. The quality of the pain is described as aching. The pain is at a severity of 7/10. The pain has been constant since onset. He reports no foreign bodies present. Nothing aggravates the symptoms. He has tried nothing for the symptoms.     Past Medical History   Diagnosis Date    Diabetes mellitus type II     Diabetic foot ulcer     Hyperlipidemia     Hypertension     Peripheral neuropathy     Ulcerative colitis, unspecified      Past Medical History Pertinent Negatives   Diagnosis Date Noted    Amblyopia 6/10/2013    Arthritis 6/10/2013    Cataract 6/10/2013    Diabetic retinopathy 6/10/2013    Glaucoma 6/10/2013    Macular degeneration 6/10/2013    Retinal detachment 6/10/2013    Strabismus 6/10/2013    Uveitis 6/10/2013     History reviewed. No pertinent past surgical history.  Family History   Problem Relation Age of Onset    Adopted: Yes    Hypertension Mother     Cancer Mother      breast    Diabetes Mother     Hypertension Father     Cataracts Father     Diabetes Sister     Hypertension Maternal Aunt      Social History   Substance Use  Topics    Smoking status: Never Smoker    Smokeless tobacco: None    Alcohol use No     Review of Systems   Constitutional: Negative.    HENT: Negative.    Respiratory: Negative.    All other systems reviewed and are negative.      Physical Exam   Initial Vitals   BP Pulse Resp Temp SpO2   01/12/17 0118 01/12/17 0118 01/12/17 0118 01/12/17 0118 01/12/17 0118   121/85 118 20 98.5 °F (36.9 °C) 97 %     Physical Exam    Nursing note and vitals reviewed.  Constitutional: No distress.   Chronically ill     HENT:   Head: Normocephalic.   Eyes: EOM are normal. Pupils are equal, round, and reactive to light.   Neck: Normal range of motion.   Cardiovascular: Normal rate.   Pulmonary/Chest: No respiratory distress.   Abdominal: Soft.   Neurological: He is alert and oriented to person, place, and time.   Skin: Abscess noted.   Thick swollen and cellulitic R foot, deep ulceration at the base of his first MTP   Psychiatric: He has a normal mood and affect. His behavior is normal. Thought content normal.         ED Course   Critical Care  Date/Time: 1/12/2017 4:47 AM  Performed by: MARCELINO CHEEK  Authorized by: MARCELINO CHEEK   Direct patient critical care time: 5 minutes  Additional history critical care time: 5 minutes  Ordering / reviewing critical care time: 6 minutes  Documentation critical care time: 5 minutes  Consulting other physicians critical care time: 6 minutes  Total critical care time (exclusive of procedural time) : 27 minutes  Critical care was necessary to treat or prevent imminent or life-threatening deterioration of the following conditions: sepsis.  Critical care was time spent personally by me on the following activities: examination of patient, discussions with consultants, discussions with primary provider, interpretation of cardiac output measurements, ordering and review of laboratory studies, ordering and review of radiographic studies, re-evaluation of patient's condition and review of old  charts.        Labs Reviewed   CBC W/ AUTO DIFFERENTIAL - Abnormal; Notable for the following:        Result Value    RBC 4.15 (*)     Hemoglobin 11.8 (*)     Hematocrit 37.0 (*)     MCHC 31.9 (*)     Gran% 75.3 (*)     All other components within normal limits   COMPREHENSIVE METABOLIC PANEL - Abnormal; Notable for the following:     Glucose 421 (*)     Albumin 2.7 (*)     ALT 7 (*)     All other components within normal limits   C-REACTIVE PROTEIN - Abnormal; Notable for the following:     CRP 19.6 (*)     All other components within normal limits   POCT GLUCOSE - Abnormal; Notable for the following:     POCT Glucose 403 (*)     All other components within normal limits   BETA - HYDROXYBUTYRATE, SERUM   SEDIMENTATION RATE, MANUAL   POCT GLUCOSE MONITORING CONTINUOUS   POCT GLUCOSE MONITORING CONTINUOUS          X-Rays:   Independently Interpreted Readings:   Other Readings:  R foot xray with obvious bony destruction of first MTP consistent with osteomyelitis    Medical Decision Making:   Initial Assessment:   45 yo M with osteo  Differential Diagnosis:   Osteomyelitis, cellulitis, possible DKA  ED Management:  Given IV antibiotics coverage for osteo  Blood and wound culture  IVF   insulin                   ED Course     Clinical Impression:   The primary encounter diagnosis was Subacute osteomyelitis of right foot. A diagnosis of Infection was also pertinent to this visit.          Katerina Buck MD  01/12/17 0736     Statement Selected

## 2024-09-10 NOTE — TELEPHONE ENCOUNTER
----- Message from Maria Dolores Tan MD sent at 9/10/2024 11:35 AM CDT -----  Please let infusion co that we are extending antibiotics x 2 weeks

## 2024-09-10 NOTE — PROGRESS NOTES
Patient arrive to clinic for lab draw and dressing change per MD orders (CBC, CMP, CRP. CK).    SUELLEN PICC line, intact and able to flushed with NS without difficulty and with sluggish blood return noted to both ports. Lab blood draw orders received and collected as ordered. Blood specimen sent to Lab via . SUELLEN PICC line double lumen flushed with heparin as ordered.    SUELLEN PICC line dressing changed per facility protocol; sterile technique and good handwashing observed.     Limited head-to-toe assessment due to privacy issues and visit reason though the opportunity was given for patient to express any concerns.    next appt for labs and dressing change given to pt;

## 2024-09-13 ENCOUNTER — HOSPITAL ENCOUNTER (OUTPATIENT)
Dept: WOUND CARE | Facility: HOSPITAL | Age: 54
Discharge: HOME | End: 2024-09-13
Attending: PODIATRIST
Payer: MEDICARE

## 2024-09-13 VITALS
HEART RATE: 93 BPM | RESPIRATION RATE: 12 BRPM | SYSTOLIC BLOOD PRESSURE: 141 MMHG | DIASTOLIC BLOOD PRESSURE: 83 MMHG | TEMPERATURE: 97 F

## 2024-09-13 DIAGNOSIS — L97.426 DIABETIC ULCER OF LEFT MIDFOOT ASSOCIATED WITH TYPE 2 DIABETES MELLITUS, WITH BONE INVOLVEMENT WITHOUT EVIDENCE OF NECROSIS: ICD-10-CM

## 2024-09-13 DIAGNOSIS — E11.621 DIABETIC ULCER OF LEFT MIDFOOT ASSOCIATED WITH TYPE 2 DIABETES MELLITUS, WITH BONE INVOLVEMENT WITHOUT EVIDENCE OF NECROSIS: ICD-10-CM

## 2024-09-13 DIAGNOSIS — M86.10 ACUTE ON CHRONIC OSTEOMYELITIS: Primary | ICD-10-CM

## 2024-09-13 DIAGNOSIS — M86.60 ACUTE ON CHRONIC OSTEOMYELITIS: Primary | ICD-10-CM

## 2024-09-13 PROCEDURE — 11042 DBRDMT SUBQ TIS 1ST 20SQCM/<: CPT | Mod: HCNC | Performed by: PODIATRIST

## 2024-09-13 PROCEDURE — 11042 DBRDMT SUBQ TIS 1ST 20SQCM/<: CPT | Mod: HCNC,,, | Performed by: PODIATRIST

## 2024-09-13 PROCEDURE — 99499 UNLISTED E&M SERVICE: CPT | Mod: HCNC,,, | Performed by: PODIATRIST

## 2024-09-13 NOTE — PROGRESS NOTES
"[facility]                Progress Note    Subjective:       Patient ID: Indio Ryder Jr. is a 54 y.o. male.    Chief Complaint: Wound Check, Diabetic Foot Ulcer, and Dressing Change    Follow Up wound care visit. Patient ambulated with assistance of knee scooter for LLE to [facility]. Prescribed CAM Boot on left Foot. Patient denies fever, chills, nausea, vomiting or diarrhea at present. Patient denies pain or discomfort to wound bed at present. Patient reports not having any issues with dressing to LLE since last visit. Dressing to Left Deltoid "fell off the other day", but it appears wound is closed. PICC line still observed in RUE, with recent dressing change from 09/10/24 that is intact. Dressing to LLE appears intact without strikethrough drainage, Tubigrip is well-placed. Dressing on LLE removed & wound cleaned by nurse. No change to dressing order; applied per MD order. Patient will follow up for 3 nurse visits Tuesday 09/17, Thursday 09/19, Tuesday 09/24 and then return to see DPM in 2 weeks.         Review of Systems      Objective:        Physical Exam    Vitals:    09/13/24 0900   BP: (!) 141/83   Pulse: 93   Resp: 12   Temp: 97 °F (36.1 °C)       Assessment:           ICD-10-CM ICD-9-CM   1. Acute on chronic osteomyelitis  M86.10 730.00    M86.60 730.10   2. Diabetic ulcer of left midfoot associated with type 2 diabetes mellitus, with bone involvement without evidence of necrosis  E11.621 250.80    L97.426 707.14            (Retired) Wound 04/08/22 1137 Diabetic Ulcer Left plantar;lateral Foot (Active)   04/08/22 1137    Pre-existing: Yes   Primary Wound Type: Diabetic ulc   Side: Left   Orientation: plantar;lateral   Location: Foot   Wound Number:    Ankle-Brachial Index:    Pulses:    Removal Indication and Assessment:    Wound Outcome:    (Retired) Wound Type:    (Retired) Wound Length (cm):    (Retired) Wound Width (cm):    (Retired) Depth (cm):    Wound " Description (Comments):    Removal Indications:    Wound Image   09/13/24 0910   Wound WDL ex 09/13/24 0910   Dressing Appearance Intact;Moist drainage 09/13/24 0910   Drainage Amount Moderate 09/13/24 0910   Drainage Characteristics/Odor Serosanguineous;No odor 09/13/24 0910   Appearance Pink;Moist 09/13/24 0910   Tissue loss description Full thickness 09/13/24 0910   Black (%), Wound Tissue Color 0 % 09/13/24 0910   Red (%), Wound Tissue Color 100 % 09/13/24 0910   Yellow (%), Wound Tissue Color 0 % 09/13/24 0910   Periwound Area Pale white;Macerated 09/13/24 0910   Wound Edges Defined;Open 09/13/24 0910   Wound Length (cm) 6 cm 09/13/24 0910   Wound Width (cm) 2.5 cm 09/13/24 0910   Wound Depth (cm) 0.2 cm 09/13/24 0910   Wound Volume (cm^3) 3 cm^3 09/13/24 0910   Wound Surface Area (cm^2) 15 cm^2 09/13/24 0910   Tunneling (depth (cm)/location) 0 09/13/24 0910   Undermining (depth (cm)/location) 0 09/13/24 0910   Care Cleansed with:;Antimicrobial agent;Sterile normal saline;Debrided;Wound cleanser 09/13/24 0910   Dressing Changed 09/13/24 0910   Periwound Care Moisture barrier applied;Absorptive dressing applied 09/13/24 0910   Compression Tubular elasticized bandage 09/13/24 0910   Off Loading Football dressing;Off loading shoe 09/13/24 0910   Dressing Change Due 09/17/24 09/13/24 0910       [REMOVED]      Wound 06/25/24 Abscess Left upper Arm (Removed)   06/25/24    Present on Original Admission: Y   Primary Wound Type: Abscess   Side: Left   Orientation: upper   Location: Arm   Wound Approximate Age at First Assessment (Weeks):    Wound Number:    Is this injury device related?:    Incision Type:    Closure Method:    Wound Description (Comments):    Type:    Additional Comments:    Ankle-Brachial Index:    Pulses:    Removal Indication and Assessment:    Wound Outcome: Healed   Removed 09/13/24    Wound Image   09/13/24 0910   Dressing Appearance Open to air;No dressing 09/13/24 0910   Drainage Amount None  "09/13/24 0910   Appearance Closed/resurfaced 09/13/24 0910   Tissue loss description Not applicable 09/13/24 0910   Wound Length (cm) 0 cm 09/13/24 0910   Wound Width (cm) 0 cm 09/13/24 0910   Wound Depth (cm) 0 cm 09/13/24 0910   Wound Volume (cm^3) 0 cm^3 09/13/24 0910   Wound Surface Area (cm^2) 0 cm^2 09/13/24 0910   Tunneling (depth (cm)/location) 0 09/13/24 0910   Undermining (depth (cm)/location) 0 09/13/24 0910   Care Cleansed with:;Antimicrobial agent;Sterile normal saline 09/13/24 0910           Plan:              Tissue pathology and/or culture taken     [] Yes      [x] No  Sharp debridement performed                   [x] Yes       [] No  Labs ordered     [] Yes       [x] No  Imaging ordered    [] Yes      [x] No    Orders Placed This Encounter   Procedures    Change dressing     Wound Dressing Orders      Dressing change frequency twice a week (prn nurse visit)  Remove old dressing  Cleanse with: Hibiclens and Normal Saline, Vashe soak x 10 minutes (may use Vinegar soak x 10 minutes at nurse visit if odorous)    Left Plantar Foot  Protect periwound with:  Gentian Violet, areas of maceration and hugging wound bed  Primary dressing: WOUND BASE: Endoform then Silvercel (2 layers - secured with Steri-strips pulling lateral aspect towards wound bed)  Secondary dressing:  Mextra 5" x 9" laid vertically.  Abram foam long x 2 (laid vertically)  Compression/Off-load: Open-Toe Football (cast padding x 2 rolls). Tubigrip, size D, double layer (calf size 35.5 cm) CAM Boot on the affected foot     Return to clinic in 4 days for nurse visit.        Follow up in about 4 days (around 9/17/2024) for Nurse Visit.       " 09/13/24 0910   Tissue loss description Not applicable 09/13/24 0910   Wound Length (cm) 0 cm 09/13/24 0910   Wound Width (cm) 0 cm 09/13/24 0910   Wound Depth (cm) 0 cm 09/13/24 0910   Wound Volume (cm^3) 0 cm^3 09/13/24 0910   Wound Surface Area (cm^2) 0 cm^2 09/13/24 0910   Tunneling (depth (cm)/location) 0 09/13/24 0910   Undermining (depth (cm)/location) 0 09/13/24 0910   Care Cleansed with:;Antimicrobial agent;Sterile normal saline 09/13/24 0910           Plan:              Education about the prevention of limb loss.    Discussed wound healing cycle, skin integrity, ways to care for skin.Counseled patient on the effects of biomechanical pressure,  PVD, and blood glucose on healing. He verbalizes understanding that it can increase the chances of delayed healing and this prolonged exposure leads to infection or progression of infection which subsequently can result in loss of limb.    Dressings removed & wounds cleaned by nurse.      Left Plantar Foot wound is measuring smaller with no palpable bone today    Abx per ID follow up next week    No change to dressing order; applied per MD order. Patient will follow up for 3 nurse visits Tuesday 09/17, Thursday 09/19, Tuesday 09/24 and then return to see DPM in 2 weeks.         Wound Debridement    Date/Time: 9/13/2024 8:54 AM    Performed by: Marivel Peterson DPM  Authorized by: Marivel Peterson DPM      Wound Details:    Location:  Left foot    Location:  Left Plantar    Type of Debridement:  Excisional       Length (cm):  6       Area (sq cm):  15       Width (cm):  2.5       Percent Debrided (%):  100       Depth (cm):  0.2       Total Area Debrided (sq cm):  15    Depth of debridement:  Subcutaneous tissue    Tissue debrided:  Dermis, Epidermis and Subcutaneous    Devitalized tissue debrided:  Callus and Fibrin    Instruments:  Curette  Bleeding:  Minimal  Hemostasis Achieved: Yes  Method Used:  Pressure  Patient tolerance:  Patient tolerated the procedure well  "with no immediate complications      Tissue pathology and/or culture taken     [] Yes      [x] No  Sharp debridement performed                   [x] Yes       [] No  Labs ordered     [] Yes       [x] No  Imaging ordered    [] Yes      [x] No    Orders Placed This Encounter   Procedures    Debridement     This order was created via procedure documentation     Standing Status:   Standing     Number of Occurrences:   1    Change dressing     Wound Dressing Orders      Dressing change frequency twice a week (prn nurse visit)  Remove old dressing  Cleanse with: Hibiclens and Normal Saline, Vashe soak x 10 minutes (may use Vinegar soak x 10 minutes at nurse visit if odorous)    Left Plantar Foot  Protect periwound with:  Gentian Violet, areas of maceration and hugging wound bed  Primary dressing: WOUND BASE: Endoform then Silvercel (2 layers - secured with Steri-strips pulling lateral aspect towards wound bed)  Secondary dressing:  Mextra 5" x 9" laid vertically.  Abram foam long x 2 (laid vertically)  Compression/Off-load: Open-Toe Football (cast padding x 2 rolls). Tubigrip, size D, double layer (calf size 35.5 cm) CAM Boot on the affected foot     Return to clinic in 4 days for nurse visit.        Follow up in about 4 days (around 9/17/2024) for Nurse Visit.       "

## 2024-09-16 ENCOUNTER — PATIENT OUTREACH (OUTPATIENT)
Dept: ADMINISTRATIVE | Facility: HOSPITAL | Age: 54
End: 2024-09-16
Payer: MEDICARE

## 2024-09-16 ENCOUNTER — OFFICE VISIT (OUTPATIENT)
Dept: PODIATRY | Facility: CLINIC | Age: 54
End: 2024-09-16
Payer: MEDICARE

## 2024-09-16 VITALS
HEART RATE: 93 BPM | HEIGHT: 73 IN | WEIGHT: 219.81 LBS | SYSTOLIC BLOOD PRESSURE: 135 MMHG | BODY MASS INDEX: 29.13 KG/M2 | DIASTOLIC BLOOD PRESSURE: 87 MMHG

## 2024-09-16 DIAGNOSIS — I73.9 PVD (PERIPHERAL VASCULAR DISEASE): ICD-10-CM

## 2024-09-16 DIAGNOSIS — E11.65 POORLY CONTROLLED TYPE 2 DIABETES MELLITUS WITH PERIPHERAL NEUROPATHY: ICD-10-CM

## 2024-09-16 DIAGNOSIS — E11.42 POORLY CONTROLLED TYPE 2 DIABETES MELLITUS WITH PERIPHERAL NEUROPATHY: ICD-10-CM

## 2024-09-16 DIAGNOSIS — M86.672 CHRONIC OSTEOMYELITIS OF LEFT FOOT: Primary | ICD-10-CM

## 2024-09-16 DIAGNOSIS — L97.426 DIABETIC ULCER OF LEFT MIDFOOT ASSOCIATED WITH TYPE 2 DIABETES MELLITUS, WITH BONE INVOLVEMENT WITHOUT EVIDENCE OF NECROSIS: ICD-10-CM

## 2024-09-16 DIAGNOSIS — E11.621 DIABETIC ULCER OF LEFT MIDFOOT ASSOCIATED WITH TYPE 2 DIABETES MELLITUS, WITH BONE INVOLVEMENT WITHOUT EVIDENCE OF NECROSIS: ICD-10-CM

## 2024-09-16 PROCEDURE — 3046F HEMOGLOBIN A1C LEVEL >9.0%: CPT | Mod: HCNC,CPTII,S$GLB, | Performed by: PODIATRIST

## 2024-09-16 PROCEDURE — 11042 DBRDMT SUBQ TIS 1ST 20SQCM/<: CPT | Mod: HCNC,S$GLB,, | Performed by: PODIATRIST

## 2024-09-16 PROCEDURE — 3008F BODY MASS INDEX DOCD: CPT | Mod: HCNC,CPTII,S$GLB, | Performed by: PODIATRIST

## 2024-09-16 PROCEDURE — 1159F MED LIST DOCD IN RCRD: CPT | Mod: HCNC,CPTII,S$GLB, | Performed by: PODIATRIST

## 2024-09-16 PROCEDURE — 99999 PR PBB SHADOW E&M-EST. PATIENT-LVL III: CPT | Mod: PBBFAC,,, | Performed by: PODIATRIST

## 2024-09-16 PROCEDURE — 3075F SYST BP GE 130 - 139MM HG: CPT | Mod: HCNC,CPTII,S$GLB, | Performed by: PODIATRIST

## 2024-09-16 PROCEDURE — 99213 OFFICE O/P EST LOW 20 MIN: CPT | Mod: 25,HCNC,S$GLB, | Performed by: PODIATRIST

## 2024-09-16 PROCEDURE — 3079F DIAST BP 80-89 MM HG: CPT | Mod: HCNC,CPTII,S$GLB, | Performed by: PODIATRIST

## 2024-09-16 PROCEDURE — 1160F RVW MEDS BY RX/DR IN RCRD: CPT | Mod: HCNC,CPTII,S$GLB, | Performed by: PODIATRIST

## 2024-09-16 RX ORDER — SODIUM CHLORIDE 0.9 % (FLUSH) 0.9 %
10 SYRINGE (ML) INJECTION
OUTPATIENT
Start: 2024-09-16

## 2024-09-16 RX ORDER — HEPARIN 100 UNIT/ML
500 SYRINGE INTRAVENOUS
OUTPATIENT
Start: 2024-09-16

## 2024-09-16 NOTE — PROGRESS NOTES
Population Health Chart Review & Patient Outreach Details      Additional [facility] Notes:    Per [facility] in basket message, pt declined flu vaccine. HM updated.           Updates Requested / Reviewed:      Updated Care Coordination Note, Care Everywhere, and Immunizations Reconciliation Completed or Queried: [facility] Topics Overdue:      [facility] Score: 3     Urine Screening  Eye Exam  Lipid Panel                       Health Maintenance Topic(s) Outreach Outcomes & Actions Taken:

## 2024-09-16 NOTE — PROCEDURES
"Wound Debridement    Date/Time: 9/16/2024 2:30 PM    Performed by: Daniel Reyes DPM  Authorized by: Daniel Reyes DPM    Time out: Immediately prior to procedure a "time out" was called to verify the correct patient, procedure, equipment, support staff and site/side marked as required.    Consent Done?:  Yes (Verbal)    Preparation: Patient was prepped and draped with clean technique    Local anesthesia used?: No      Wound Details:    Location:  Left foot    Location:  Left Plantar (lateral distal)    Type of Debridement:  Excisional       Length (cm):  1.8       Area (sq cm):  2.7       Width (cm):  1.5       Percent Debrided (%):  100       Depth (cm):  0.2       Total Area Debrided (sq cm):  2.7    Depth of debridement:  Subcutaneous tissue    Tissue debrided:  Subcutaneous    Devitalized tissue debrided:  Slough, Fibrin, Biofilm and Callus    Instruments:  Curette  Bleeding:  Minimal  Hemostasis Achieved: Yes  Method Used:  Pressure    2nd Wound Details:     Location:  Left foot    Location:  Left Plantar (lateral proximal)    Location:  Left Plantar (lateral proximal)    Type of Debridement:  Excisional       Length (cm):  3.5       Area (sq cm):  9.8       Width (cm):  2.8       Percent Debrided (%):  100       Depth (cm):  0.4       Total Area Debrided (sq cm):  9.8    Depth of debridement:  Subcutaneous tissue    Tissue debrided:  Subcutaneous    Devitalized tissue debrided:  Slough, Fibrin, Callus and Biofilm    Instruments:  Curette  Bleeding:  Minimal  Hemostasis Achieved: Yes    Method Used:  Pressure and Silver Nitrate  Patient tolerance:  Patient tolerated the procedure well with no immediate complications  1st Wound Pain Assessment: 0     Applied felt aperture padding to the periwound skin followed by Hydrofera ready blue and overlying Abram foam secured with cast padding and Coban forming football dressing. Assisted by Twyla Lucas DPM PGY 3    "

## 2024-09-16 NOTE — PROGRESS NOTES
Subjective:     Patient ID: Indio Ryder Jr. is a 54 y.o. male.    Chief Complaint: Diabetic Foot Ulcer, surgical consult    Indio is a 54 y.o. male who presents to the clinic for evaluation and treatment of high risk feet. 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), Colon adenocarcinoma (04/12/2022), Diabetic ketoacidosis without coma associated with type 2 diabetes mellitus (05/30/2017), Diabetic ulcer of right foot associated with type 2 diabetes mellitus (06/03/2015), Disorder of kidney and ureter, Essential hypertension (06/06/2013), Group B streptococcal infection (01/13/2017), Mixed hyperlipidemia (08/12/2014), Septic arthritis of left foot (04/28/2021), Shoulder impingement (08/12/2014), Subacute osteomyelitis of right foot (01/12/2017), Traumatic amputation of fifth toe of right foot (07/02/2015), and Type 2 diabetes mellitus with diabetic neuropathy, with long-term current use of insulin (05/03/2016). The patient's chief complaint is foot ulcer along the left plantar midfoot.  Patient had been seeing Dr. Peterson in the [facility] on a biweekly basis for management of his left foot ulceration. He presents today for surgical consultation, evaluation for rearfoot fusion in regards to his left foot and ankle.  His last A1c was obtained in April, greater than 14.0.  Patient has had extensive pedal surgical history, last having had a debridement per Dr. Peterson in July of 2023. Patient had received a custom ankle brace in January and states that he subsequently had developed an ulceration as a result of it; previous to this, the ulceration of his left midfoot had healed over.     09/16/2024:  Follow-up from updated weight-bearing left foot and ankle x-ray imaging and lab work.  Seen per  in wound clinic on weekly basis.  Ambulating with cam boot.  States he is in the process of adjusting his medications due to uncontrolled hyperglycemia.    PCP: Lorena Thakur MD       Current shoe gear:  Affected Foot: Surgical boot, rolling knee scooter     Unaffected Foot: Extra depth shoes    Hemoglobin A1C   Date Value Ref Range Status   08/08/2024 >14.0 (H) 4.0 - 5.6 % Final     Comment:     ADA Screening Guidelines:  5.7-6.4%  Consistent with prediabetes  >or=6.5%  Consistent with diabetes    High levels of fetal hemoglobin interfere with the HbA1C  assay. Heterozygous hemoglobin variants (HbS, HgC, etc)do  not significantly interfere with this assay.   However, presence of multiple variants may affect accuracy.     04/20/2024 >14.0 (H) 4.0 - 5.6 % Final     Comment:     ADA Screening Guidelines:  5.7-6.4%  Consistent with prediabetes  >or=6.5%  Consistent with diabetes    High levels of fetal hemoglobin interfere with the HbA1C  assay. Heterozygous hemoglobin variants (HbS, HgC, etc)do  not significantly interfere with this assay.   However, presence of multiple variants may affect accuracy.     01/13/2023 10.1 (H) 4.0 - 5.6 % Final     Comment:     ADA Screening Guidelines:  5.7-6.4%  Consistent with prediabetes  >or=6.5%  Consistent with diabetes    High levels of fetal hemoglobin interfere with the HbA1C  assay. Heterozygous hemoglobin variants (HbS, HgC, etc)do  not significantly interfere with this assay.   However, presence of multiple variants may affect accuracy.         Review of Systems   Constitutional: Negative for chills and fever.   HENT:  Negative for congestion.    Eyes: Negative.    Cardiovascular:  Negative for chest pain.   Respiratory:  Negative for cough and shortness of breath.    Skin:  Positive for dry skin.   Neurological:  Positive for numbness.   Psychiatric/Behavioral:  Negative for altered mental status.         Objective:     Physical Exam  Vitals reviewed.   Constitutional:       General: He is not in acute distress.     Appearance: He is not ill-appearing.   Cardiovascular:      Comments: Capillary refill greater than 3 seconds to left digits.     Pedal  pulses heard only through Doppler bilateral:    Triphasic PT, monophasic DP left   Monophasic PT, monophasic DP right  Musculoskeletal:         General: No swelling or tenderness.      Right lower leg: No edema.      Left lower leg: No edema.      Comments: Amputated left 5th ray.  Mild varus rotation to the left foot noted.  Left foot she was 0° dorsiflexion with the knee extended at the ankle.  No pain on palpation or range motion to the foot and ankle bilateral.   Feet:      Right foot:      Protective Sensation: 10 sites tested.  0 sites sensed.      Skin integrity: Ulcer present.      Left foot:      Protective Sensation: 10 sites tested.  0 sites sensed.   Skin:     General: Skin is warm and dry.      Findings: No ecchymosis or erythema.      Nails: There is no clubbing.      Comments: Two separate ulcerations form to plantar lateral left midfoot.  The most plantar distal ulceration contains a mixed granular fibrous base with slightly raised hyperkeratotic margin extending down to subcutaneous tissue measuring 1.8 x 1.5 x 0.2 cm.  No significant drainage or signs infection noted.  The 2nd ulceration which is more proximal also extends down to subcutaneous tissue with mostly fibrous and granular base with slightly raised hyperkeratotic margin measuring 3.5 x 2.8 x 0.4 cm.  No acute signs infection noted.   Neurological:      Mental Status: He is alert and oriented to person, place, and time.      Cranial Nerves: No cranial nerve deficit.      Sensory: Sensory deficit present.      Comments: 0/10 Spring-Nenita monofilament bilateral   Psychiatric:         Mood and Affect: Mood normal.           Assessment:      Encounter Diagnoses   Name Primary?    Chronic osteomyelitis of left foot Yes    Poorly controlled type 2 diabetes mellitus with peripheral neuropathy     PVD (peripheral vascular disease)     Diabetic ulcer of left midfoot associated with type 2 diabetes mellitus, with bone involvement without  evidence of necrosis      Plan:     Indio was seen today for diabetic foot ulcer.    Diagnoses and all orders for this visit:    Chronic osteomyelitis of left foot    Poorly controlled type 2 diabetes mellitus with peripheral neuropathy  -     HEMOGLOBIN A1C; Future    PVD (peripheral vascular disease)    Diabetic ulcer of left midfoot associated with type 2 diabetes mellitus, with bone involvement without evidence of necrosis  -     Wound Debridement      I counseled the patient on his conditions, their implications and medical management.    Reviewed left foot and ankle x-ray completed 08/08/2024 noting moderate varus alignment of the left foot with amputated 4th and 5th ray and altered appearance of the cuboid bone.  There was gapping at the calcaneocuboid joint.  Moderate arthritic changes to the ankle noting a large attached osteochondral fragment anteriorly with large distal anterior tibial osteophyte most likely impeding ankle range motion and flattening of the talus.  Calcaneal inclination angle is declination.    Patient's follow up hemoglobin A1c was above 14%.  All other labs also reviewed noting low albumin and increased alkaline phosphatase.  Kidney function is maintained.    We discussed improving his hemoglobin A1c today% or less in order to help reduce the risk for heart attack, stroke, kidney dysfunction or other significant morbidities that would impact his healing and increase his risk.  Repeat hemoglobin A1c within 2 months.    Excisional wound debridement dressing per attached note.  Follow up in Wound Clinic as scheduled.    We discussed the need for pantalar arthrodesis to help realign the foot and ankle and maintain a plantar grade foot.  Would be initially accomplished with external fixation followed by removal of the external fixation and potential use of internal fixation if there is any sign of the delayed union of the bone.    RTC within 2 weeks to re-evaluate or p.r.n. as discussed.   If the patient develops acute signs infection then would perform staged procedure and work on perioperative control of the blood sugar.    Assisted by Twyla Lucas DPM PGY 3    A portion of this note was generated by voice recognition software and may contain spelling and grammar errors.

## 2024-09-17 ENCOUNTER — TELEPHONE (OUTPATIENT)
Dept: INFECTIOUS DISEASES | Facility: CLINIC | Age: 54
End: 2024-09-17
Payer: MEDICARE

## 2024-09-17 ENCOUNTER — INFUSION (OUTPATIENT)
Dept: INFECTIOUS DISEASES | Facility: HOSPITAL | Age: 54
End: 2024-09-17
Attending: INTERNAL MEDICINE
Payer: MEDICARE

## 2024-09-17 ENCOUNTER — HOSPITAL ENCOUNTER (OUTPATIENT)
Dept: WOUND CARE | Facility: HOSPITAL | Age: 54
Discharge: HOME | End: 2024-09-17
Attending: FAMILY MEDICINE
Payer: MEDICARE

## 2024-09-17 VITALS
TEMPERATURE: 98 F | RESPIRATION RATE: 14 BRPM | SYSTOLIC BLOOD PRESSURE: 125 MMHG | HEART RATE: 94 BPM | DIASTOLIC BLOOD PRESSURE: 76 MMHG

## 2024-09-17 VITALS
BODY MASS INDEX: 28.49 KG/M2 | HEART RATE: 100 BPM | SYSTOLIC BLOOD PRESSURE: 104 MMHG | RESPIRATION RATE: 20 BRPM | HEIGHT: 73 IN | DIASTOLIC BLOOD PRESSURE: 61 MMHG | TEMPERATURE: 98 F | OXYGEN SATURATION: 97 % | WEIGHT: 214.94 LBS

## 2024-09-17 DIAGNOSIS — M86.10 ACUTE ON CHRONIC OSTEOMYELITIS: Primary | ICD-10-CM

## 2024-09-17 DIAGNOSIS — M86.60 ACUTE ON CHRONIC OSTEOMYELITIS: Primary | ICD-10-CM

## 2024-09-17 DIAGNOSIS — L97.426 DIABETIC ULCER OF LEFT MIDFOOT ASSOCIATED WITH TYPE 2 DIABETES MELLITUS, WITH BONE INVOLVEMENT WITHOUT EVIDENCE OF NECROSIS: ICD-10-CM

## 2024-09-17 DIAGNOSIS — E11.621 DIABETIC ULCER OF LEFT MIDFOOT ASSOCIATED WITH TYPE 2 DIABETES MELLITUS, WITH BONE INVOLVEMENT WITHOUT EVIDENCE OF NECROSIS: ICD-10-CM

## 2024-09-17 LAB
ALBUMIN SERPL BCP-MCNC: 3.1 G/DL (ref 3.5–5.2)
ALP SERPL-CCNC: 187 U/L (ref 55–135)
ALT SERPL W/O P-5'-P-CCNC: 11 U/L (ref 10–44)
ANION GAP SERPL CALC-SCNC: 9 MMOL/L (ref 8–16)
AST SERPL-CCNC: 12 U/L (ref 10–40)
BASOPHILS # BLD AUTO: 0.03 K/UL (ref 0–0.2)
BASOPHILS NFR BLD: 0.6 % (ref 0–1.9)
BILIRUB SERPL-MCNC: 0.6 MG/DL (ref 0.1–1)
BUN SERPL-MCNC: 14 MG/DL (ref 6–20)
CALCIUM SERPL-MCNC: 9.6 MG/DL (ref 8.7–10.5)
CHLORIDE SERPL-SCNC: 101 MMOL/L (ref 95–110)
CK SERPL-CCNC: 79 U/L (ref 20–200)
CO2 SERPL-SCNC: 27 MMOL/L (ref 23–29)
CREAT SERPL-MCNC: 1.3 MG/DL (ref 0.5–1.4)
CRP SERPL-MCNC: 2.6 MG/L (ref 0–8.2)
DIFFERENTIAL METHOD BLD: ABNORMAL
EOSINOPHIL # BLD AUTO: 0.1 K/UL (ref 0–0.5)
EOSINOPHIL NFR BLD: 2.6 % (ref 0–8)
ERYTHROCYTE [DISTWIDTH] IN BLOOD BY AUTOMATED COUNT: 14.4 % (ref 11.5–14.5)
EST. GFR  (NO RACE VARIABLE): >60 ML/MIN/1.73 M^2
GLUCOSE SERPL-MCNC: 332 MG/DL (ref 70–110)
HCT VFR BLD AUTO: 36.6 % (ref 40–54)
HGB BLD-MCNC: 11.5 G/DL (ref 14–18)
IMM GRANULOCYTES # BLD AUTO: 0.02 K/UL (ref 0–0.04)
IMM GRANULOCYTES NFR BLD AUTO: 0.4 % (ref 0–0.5)
LYMPHOCYTES # BLD AUTO: 1.8 K/UL (ref 1–4.8)
LYMPHOCYTES NFR BLD: 35.3 % (ref 18–48)
MCH RBC QN AUTO: 28.9 PG (ref 27–31)
MCHC RBC AUTO-ENTMCNC: 31.4 G/DL (ref 32–36)
MCV RBC AUTO: 92 FL (ref 82–98)
MONOCYTES # BLD AUTO: 0.4 K/UL (ref 0.3–1)
MONOCYTES NFR BLD: 7.5 % (ref 4–15)
NEUTROPHILS # BLD AUTO: 2.7 K/UL (ref 1.8–7.7)
NEUTROPHILS NFR BLD: 53.6 % (ref 38–73)
NRBC BLD-RTO: 0 /100 WBC
PLATELET # BLD AUTO: 244 K/UL (ref 150–450)
PMV BLD AUTO: 10.8 FL (ref 9.2–12.9)
POTASSIUM SERPL-SCNC: 3.9 MMOL/L (ref 3.5–5.1)
PROT SERPL-MCNC: 7.3 G/DL (ref 6–8.4)
RBC # BLD AUTO: 3.98 M/UL (ref 4.6–6.2)
SODIUM SERPL-SCNC: 137 MMOL/L (ref 136–145)
WBC # BLD AUTO: 5.07 K/UL (ref 3.9–12.7)

## 2024-09-17 PROCEDURE — 82550 ASSAY OF CK (CPK): CPT | Mod: HCNC | Performed by: INTERNAL MEDICINE

## 2024-09-17 PROCEDURE — 86140 C-REACTIVE PROTEIN: CPT | Mod: HCNC | Performed by: INTERNAL MEDICINE

## 2024-09-17 PROCEDURE — 36415 COLL VENOUS BLD VENIPUNCTURE: CPT | Mod: HCNC | Performed by: INTERNAL MEDICINE

## 2024-09-17 PROCEDURE — 96375 TX/PRO/DX INJ NEW DRUG ADDON: CPT | Mod: HCNC

## 2024-09-17 PROCEDURE — 63600175 PHARM REV CODE 636 W HCPCS: Mod: JZ,JG,HCNC | Performed by: INTERNAL MEDICINE

## 2024-09-17 PROCEDURE — 96374 THER/PROPH/DIAG INJ IV PUSH: CPT | Mod: HCNC

## 2024-09-17 PROCEDURE — 36592 COLLECT BLOOD FROM PICC: CPT | Mod: HCNC

## 2024-09-17 PROCEDURE — 85025 COMPLETE CBC W/AUTO DIFF WBC: CPT | Mod: HCNC | Performed by: INTERNAL MEDICINE

## 2024-09-17 PROCEDURE — 99213 OFFICE O/P EST LOW 20 MIN: CPT | Mod: 25,HCNC

## 2024-09-17 PROCEDURE — 80053 COMPREHEN METABOLIC PANEL: CPT | Mod: HCNC | Performed by: INTERNAL MEDICINE

## 2024-09-17 RX ORDER — SODIUM CHLORIDE 0.9 % (FLUSH) 0.9 %
10 SYRINGE (ML) INJECTION
OUTPATIENT
Start: 2024-09-18

## 2024-09-17 RX ORDER — HEPARIN 100 UNIT/ML
500 SYRINGE INTRAVENOUS
Status: COMPLETED | OUTPATIENT
Start: 2024-09-17 | End: 2024-09-17

## 2024-09-17 RX ORDER — SODIUM CHLORIDE 0.9 % (FLUSH) 0.9 %
10 SYRINGE (ML) INJECTION
Status: DISCONTINUED | OUTPATIENT
Start: 2024-09-17 | End: 2024-09-17 | Stop reason: HOSPADM

## 2024-09-17 RX ORDER — HEPARIN 100 UNIT/ML
500 SYRINGE INTRAVENOUS
OUTPATIENT
Start: 2024-09-18

## 2024-09-17 RX ADMIN — HEPARIN 500 UNITS: 100 SYRINGE at 03:09

## 2024-09-17 RX ADMIN — ALTEPLASE 2 MG: 2.2 INJECTION, POWDER, LYOPHILIZED, FOR SOLUTION INTRAVENOUS at 02:09

## 2024-09-17 NOTE — PROGRESS NOTES
"[facility]  [address]   [place]  [id number]  Nurse Visit    Subjective:       Patient seen in clinic today. Patient ambulated with assistance of knee scooter for LLE to [facility]. Prescribed CAM Boot on left Foot. Patient denies fever, chills, nausea, vomiting or diarrhea at present. Patient denies pain or discomfort to wound bed at present. Dressing appears intact without strikethrough drainage. He was seen by Dr. Reyes yesterday, dressing was reapplied after DPM assessed patient for possible surgery. Patient reports he will not be a candidate at this time for surgery d/t elevated Alc of "13". Patient reports wanting to continue ABX therapy over insulin therapy d/t diarrhea when taking both medications together. Dressing removed & wound cleaned by nurse.  Patient declined AVS, has MyChart.  Dressing changed as ordered.      Assessment:          (Retired) Wound 04/08/22 1137 Diabetic Ulcer Left plantar;lateral Foot (Active)   04/08/22 1137    Pre-existing: Yes   Primary Wound Type: Diabetic ulc   Side: Left   Orientation: plantar;lateral   Location: Foot   Wound Number:    Ankle-Brachial Index:    Pulses:    Removal Indication and Assessment:    Wound Outcome:    (Retired) Wound Type:    (Retired) Wound Length (cm):    (Retired) Wound Width (cm):    (Retired) Depth (cm):    Wound Description (Comments):    Removal Indications:    Wound Image   09/17/24 1620   Wound WDL ex 09/17/24 1620   Dressing Appearance Intact;Moist drainage 09/17/24 1620   Drainage Amount Moderate 09/17/24 1620   Drainage Characteristics/Odor Serosanguineous;No odor 09/17/24 1620   Appearance Pink;Moist 09/17/24 1620   Tissue loss description Full thickness 09/17/24 1620   Black (%), Wound Tissue Color 0 % 09/17/24 1620   Red (%), Wound Tissue Color 100 % 09/17/24 1620   Yellow (%), Wound Tissue Color 0 % 09/17/24 1620   Periwound Area Pale white;Moist;Macerated 09/17/24 1620   Wound Edges Defined;Open 09/17/24 1620 " "  Wound Length (cm) 6 cm 09/17/24 1620   Wound Width (cm) 2.1 cm 09/17/24 1620   Wound Depth (cm) 0.2 cm 09/17/24 1620   Wound Volume (cm^3) 2.52 cm^3 09/17/24 1620   Wound Surface Area (cm^2) 12.6 cm^2 09/17/24 1620   Tunneling (depth (cm)/location) 0 09/17/24 1620   Undermining (depth (cm)/location) 0 09/17/24 1620   Care Cleansed with:;Antimicrobial agent;Sterile normal saline;Wound cleanser 09/17/24 1620   Dressing Changed 09/17/24 1620   Periwound Care Moisture barrier applied;Absorptive dressing applied 09/17/24 1620   Compression Tubular elasticized bandage 09/17/24 1620   Off Loading Football dressing;Off loading shoe 09/17/24 1620   Dressing Change Due 09/19/24 09/17/24 1620           Plan:     Wound Dressing Orders       Dressing change frequency twice a week (prn nurse visit)   Remove old dressing   Cleanse with: Hibiclens and Normal Saline, Vashe soak x 10 minutes (may use Vinegar soak x 10 minutes at nurse visit if odorous)     Left Plantar Foot   Protect periwound with:  Gentian Violet, areas of maceration and hugging wound bed   Primary dressing: WOUND BASE: Endoform then Silvercel (2 layers - secured with Steri-strips pulling lateral aspect towards wound bed)   Secondary dressing:  Mextra 5" x 9" laid vertically.  Abram foam long x 2 (laid vertically)   Compression/Off-load: Open-Toe Football (cast padding x 2 rolls). Tubigrip, size D, double layer (calf size 35.5 cm) CAM Boot on the affected foot           Follow up in about 2 days (around 9/19/2024) for Nurse Visit.          "

## 2024-09-17 NOTE — PROGRESS NOTES
Patient here in clinic for lab draw and dressing change per MD orders.    RN attempted to draw labs first, both lumens of picc flushed well, but neither of them guillermo back blood.   Cathflo instilled while dressing change took place.    SUELLEN PICC line dressing changed per sterile technique and hospital policies; patient tolerated well.    Once dressing change was completed, RN check cathflo in lines, and was able to successfully pull back blood from both lumens; at this time labs were draw from the purple luem of the picc per hospital policies; labs were labeled at beside using two patient identifier. Blood specimen sent to lab via ;      Patient left in nad;  next appt for labs and dressing change given to patient.    Limited head-to-toe assessment due to privacy issues and visit reason though the opportunity was given for patient to express any concerns.

## 2024-09-19 ENCOUNTER — TELEPHONE (OUTPATIENT)
Dept: INTERNAL MEDICINE | Facility: CLINIC | Age: 54
End: 2024-09-19
Payer: MEDICARE

## 2024-09-19 ENCOUNTER — HOSPITAL ENCOUNTER (OUTPATIENT)
Dept: WOUND CARE | Facility: HOSPITAL | Age: 54
Discharge: HOME | End: 2024-09-19
Attending: PODIATRIST
Payer: MEDICARE

## 2024-09-19 VITALS
RESPIRATION RATE: 14 BRPM | DIASTOLIC BLOOD PRESSURE: 75 MMHG | SYSTOLIC BLOOD PRESSURE: 127 MMHG | HEART RATE: 91 BPM | TEMPERATURE: 98 F

## 2024-09-19 DIAGNOSIS — M86.60 ACUTE ON CHRONIC OSTEOMYELITIS: Primary | ICD-10-CM

## 2024-09-19 DIAGNOSIS — M86.10 ACUTE ON CHRONIC OSTEOMYELITIS: Primary | ICD-10-CM

## 2024-09-19 DIAGNOSIS — E11.621 DIABETIC ULCER OF LEFT MIDFOOT ASSOCIATED WITH TYPE 2 DIABETES MELLITUS, WITH BONE INVOLVEMENT WITHOUT EVIDENCE OF NECROSIS: ICD-10-CM

## 2024-09-19 DIAGNOSIS — L97.426 DIABETIC ULCER OF LEFT MIDFOOT ASSOCIATED WITH TYPE 2 DIABETES MELLITUS, WITH BONE INVOLVEMENT WITHOUT EVIDENCE OF NECROSIS: ICD-10-CM

## 2024-09-19 DIAGNOSIS — L02.414 ABSCESS OF LEFT ARM: ICD-10-CM

## 2024-09-19 PROCEDURE — 99213 OFFICE O/P EST LOW 20 MIN: CPT | Mod: HCNC

## 2024-09-19 NOTE — TELEPHONE ENCOUNTER
----- Message from JESUS Bernardo FNP sent at 9/18/2024  3:46 PM CDT -----  Regarding: appt within 1-4 weeks  It has been so long:/  Not a problem-Ayleen offer 10/8 at 3p or any earlier ones that pop up sooner.  Gigi  ----- Message -----  From: Lorena Thakur MD  Sent: 9/18/2024   3:20 PM CDT  To: JESUS Bernardo FNP    A1c is > 14 - could you pls see him soon?  Thank you!

## 2024-09-19 NOTE — PROGRESS NOTES
[facility] [address] [place] [address]  [address]  Nurse Visit    Subjective:       Patient seen in clinic today. Patient ambulated with assistance of knee scooter for LLE to [facility]. Prescribed CAM Boot on left Foot. Patient denies fever, chills, nausea, vomiting or diarrhea at present. Patient denies pain or discomfort to wound bed at present. Dressing appears intact without strikethrough drainage. Dressing removed & wound cleaned by nurse.  Right Plantar Foot wound appears pink and moist. Patient reports possible trip to football game this weekend, applied additional Abram padding to cushion foot. Patient declined AVS, has MyChart.  Dressing changed as ordered.      Assessment:          (Retired) Wound 04/08/22 1137 Diabetic Ulcer Left plantar;lateral Foot (Active)   04/08/22 1137    Pre-existing: Yes   Primary Wound Type: Diabetic ulc   Side: Left   Orientation: plantar;lateral   Location: Foot   Wound Number:    Ankle-Brachial Index:    Pulses:    Removal Indication and Assessment:    Wound Outcome:    (Retired) Wound Type:    (Retired) Wound Length (cm):    (Retired) Wound Width (cm):    (Retired) Depth (cm):    Wound Description (Comments):    Removal Indications:    Wound Image   09/19/24 1437   Wound WDL ex 09/19/24 1437   Dressing Appearance Intact;Moist drainage 09/19/24 1437   Drainage Amount Moderate 09/19/24 1437   Drainage Characteristics/Odor Serosanguineous;No odor 09/19/24 1437   Appearance Pink;Moist 09/19/24 1437   Tissue loss description Full thickness 09/19/24 1437   Black (%), Wound Tissue Color 0 % 09/19/24 1437   Red (%), Wound Tissue Color 100 % 09/19/24 1437   Yellow (%), Wound Tissue Color 0 % 09/19/24 1437   Periwound Area Pale white;Moist;Macerated 09/19/24 1437   Wound Edges Defined;Open 09/19/24 1437   Wound Length (cm) 6 cm 09/19/24 1437   Wound Width (cm) 2.8 cm 09/19/24 1437   Wound Depth (cm) 0.2 cm 09/19/24 1437   Wound Volume (cm^3) 3.36 cm^3 09/19/24  "1437   Wound Surface Area (cm^2) 16.8 cm^2 09/19/24 1437   Tunneling (depth (cm)/location) 0 09/19/24 1437   Undermining (depth (cm)/location) 0 09/19/24 1437   Care Cleansed with:;Antimicrobial agent;Sterile normal saline;Removed:;Steri-strips 09/19/24 1437   Dressing Changed 09/19/24 1437   Periwound Care Moisture barrier applied;Absorptive dressing applied 09/19/24 1437   Compression Tubular elasticized bandage 09/19/24 1437   Off Loading Football dressing;Off loading shoe 09/19/24 1437   Dressing Change Due 09/24/24 09/19/24 1437           Plan:     Wound Dressing Orders       Dressing change frequency twice a week (prn nurse visit)   Remove old dressing   Cleanse with: Hibiclens and Normal Saline, Vashe soak x 10 minutes (may use Vinegar soak x 10 minutes at nurse visit if odorous)     Left Plantar Foot   Protect periwound with:  Gentian Violet, areas of maceration and hugging wound bed   Primary dressing: WOUND BASE: Endoform then Silvercel (2 layers - secured with Steri-strips pulling lateral aspect towards wound bed)   Secondary dressing:  Mextra 5" x 9" laid vertically.  Abram foam long x 1 laid horizontally then 2 laid vertically then 1 laid horizontally  Compression/Off-load: Open-Toe Football (cast padding x 2 rolls). Tubigrip, size D, double layer (calf size 35.5 cm) CAM Boot on the affected foot           Follow up in about 5 days (around 9/24/2024) for Nurse Visit.        "

## 2024-09-24 ENCOUNTER — INFUSION (OUTPATIENT)
Dept: INFECTIOUS DISEASES | Facility: HOSPITAL | Age: 54
End: 2024-09-24
Attending: INTERNAL MEDICINE
Payer: MEDICARE

## 2024-09-24 ENCOUNTER — HOSPITAL ENCOUNTER (OUTPATIENT)
Dept: WOUND CARE | Facility: HOSPITAL | Age: 54
Discharge: HOME | End: 2024-09-24
Attending: PODIATRIST
Payer: MEDICARE

## 2024-09-24 ENCOUNTER — PATIENT MESSAGE (OUTPATIENT)
Dept: INFECTIOUS DISEASES | Facility: CLINIC | Age: 54
End: 2024-09-24
Payer: MEDICARE

## 2024-09-24 VITALS
HEART RATE: 99 BPM | SYSTOLIC BLOOD PRESSURE: 136 MMHG | DIASTOLIC BLOOD PRESSURE: 79 MMHG | TEMPERATURE: 98 F | RESPIRATION RATE: 14 BRPM

## 2024-09-24 VITALS
WEIGHT: 213.88 LBS | RESPIRATION RATE: 18 BRPM | SYSTOLIC BLOOD PRESSURE: 137 MMHG | BODY MASS INDEX: 28.34 KG/M2 | TEMPERATURE: 98 F | DIASTOLIC BLOOD PRESSURE: 79 MMHG | HEIGHT: 73 IN | OXYGEN SATURATION: 99 % | HEART RATE: 89 BPM

## 2024-09-24 DIAGNOSIS — M86.60 ACUTE ON CHRONIC OSTEOMYELITIS: Primary | ICD-10-CM

## 2024-09-24 DIAGNOSIS — E11.621 DIABETIC ULCER OF LEFT MIDFOOT ASSOCIATED WITH TYPE 2 DIABETES MELLITUS, WITH BONE INVOLVEMENT WITHOUT EVIDENCE OF NECROSIS: ICD-10-CM

## 2024-09-24 DIAGNOSIS — M86.672 CHRONIC OSTEOMYELITIS OF LEFT FOOT: Primary | ICD-10-CM

## 2024-09-24 DIAGNOSIS — M86.10 ACUTE ON CHRONIC OSTEOMYELITIS: Primary | ICD-10-CM

## 2024-09-24 DIAGNOSIS — L97.426 DIABETIC ULCER OF LEFT MIDFOOT ASSOCIATED WITH TYPE 2 DIABETES MELLITUS, WITH BONE INVOLVEMENT WITHOUT EVIDENCE OF NECROSIS: ICD-10-CM

## 2024-09-24 DIAGNOSIS — M86.60 ACUTE ON CHRONIC OSTEOMYELITIS: ICD-10-CM

## 2024-09-24 DIAGNOSIS — M86.10 ACUTE ON CHRONIC OSTEOMYELITIS: ICD-10-CM

## 2024-09-24 LAB
ALBUMIN SERPL BCP-MCNC: 3.3 G/DL (ref 3.5–5.2)
ALP SERPL-CCNC: 191 U/L (ref 55–135)
ALT SERPL W/O P-5'-P-CCNC: 11 U/L (ref 10–44)
ANION GAP SERPL CALC-SCNC: 11 MMOL/L (ref 8–16)
AST SERPL-CCNC: 12 U/L (ref 10–40)
BASOPHILS # BLD AUTO: 0.03 K/UL (ref 0–0.2)
BASOPHILS NFR BLD: 0.6 % (ref 0–1.9)
BILIRUB SERPL-MCNC: 0.5 MG/DL (ref 0.1–1)
BUN SERPL-MCNC: 12 MG/DL (ref 6–20)
CALCIUM SERPL-MCNC: 9.5 MG/DL (ref 8.7–10.5)
CHLORIDE SERPL-SCNC: 103 MMOL/L (ref 95–110)
CK SERPL-CCNC: 98 U/L (ref 20–200)
CO2 SERPL-SCNC: 23 MMOL/L (ref 23–29)
CREAT SERPL-MCNC: 1.1 MG/DL (ref 0.5–1.4)
CRP SERPL-MCNC: 7.3 MG/L (ref 0–8.2)
DIFFERENTIAL METHOD BLD: ABNORMAL
EOSINOPHIL # BLD AUTO: 0.1 K/UL (ref 0–0.5)
EOSINOPHIL NFR BLD: 1.6 % (ref 0–8)
ERYTHROCYTE [DISTWIDTH] IN BLOOD BY AUTOMATED COUNT: 14.6 % (ref 11.5–14.5)
EST. GFR  (NO RACE VARIABLE): >60 ML/MIN/1.73 M^2
GLUCOSE SERPL-MCNC: 284 MG/DL (ref 70–110)
HCT VFR BLD AUTO: 37.7 % (ref 40–54)
HGB BLD-MCNC: 11.7 G/DL (ref 14–18)
IMM GRANULOCYTES # BLD AUTO: 0.01 K/UL (ref 0–0.04)
IMM GRANULOCYTES NFR BLD AUTO: 0.2 % (ref 0–0.5)
LYMPHOCYTES # BLD AUTO: 1.8 K/UL (ref 1–4.8)
LYMPHOCYTES NFR BLD: 36.9 % (ref 18–48)
MCH RBC QN AUTO: 28.6 PG (ref 27–31)
MCHC RBC AUTO-ENTMCNC: 31 G/DL (ref 32–36)
MCV RBC AUTO: 92 FL (ref 82–98)
MONOCYTES # BLD AUTO: 0.4 K/UL (ref 0.3–1)
MONOCYTES NFR BLD: 7.4 % (ref 4–15)
NEUTROPHILS # BLD AUTO: 2.6 K/UL (ref 1.8–7.7)
NEUTROPHILS NFR BLD: 53.3 % (ref 38–73)
NRBC BLD-RTO: 0 /100 WBC
PLATELET # BLD AUTO: 261 K/UL (ref 150–450)
PMV BLD AUTO: 11.2 FL (ref 9.2–12.9)
POTASSIUM SERPL-SCNC: 3.8 MMOL/L (ref 3.5–5.1)
PROT SERPL-MCNC: 7.8 G/DL (ref 6–8.4)
RBC # BLD AUTO: 4.09 M/UL (ref 4.6–6.2)
SODIUM SERPL-SCNC: 137 MMOL/L (ref 136–145)
WBC # BLD AUTO: 4.85 K/UL (ref 3.9–12.7)

## 2024-09-24 PROCEDURE — 86140 C-REACTIVE PROTEIN: CPT | Mod: HCNC | Performed by: INTERNAL MEDICINE

## 2024-09-24 PROCEDURE — 85025 COMPLETE CBC W/AUTO DIFF WBC: CPT | Mod: HCNC | Performed by: INTERNAL MEDICINE

## 2024-09-24 PROCEDURE — 80053 COMPREHEN METABOLIC PANEL: CPT | Mod: HCNC | Performed by: INTERNAL MEDICINE

## 2024-09-24 PROCEDURE — 82550 ASSAY OF CK (CPK): CPT | Mod: HCNC | Performed by: INTERNAL MEDICINE

## 2024-09-24 PROCEDURE — 99213 OFFICE O/P EST LOW 20 MIN: CPT | Mod: 25,HCNC

## 2024-09-24 PROCEDURE — 36415 COLL VENOUS BLD VENIPUNCTURE: CPT | Mod: HCNC | Performed by: INTERNAL MEDICINE

## 2024-09-24 PROCEDURE — 36592 COLLECT BLOOD FROM PICC: CPT | Mod: HCNC

## 2024-09-24 NOTE — PROGRESS NOTES
[facility]  [address]  [address]  Nurse Visit    Subjective:       Patient seen in clinic today. Patient ambulated with assistance of knee scooter for LLE to [facility]. Prescribed CAM Boot on left Foot. Patient denies fever, chills, nausea, vomiting or diarrhea at present. Patient denies pain or discomfort to wound bed at present. Dressing appears intact without strikethrough drainage. Dressing removed & wound cleaned by nurse.  Left Plantar Foot wound is pink and moist. Patient reports PICC line was removed today, he has a follow up with ID via virtual tomorrow to discuss any additional therapies. Patient declined AVS, has MyChart.  Dressing changed as ordered.      Assessment:          (Retired) Wound 04/08/22 1137 Diabetic Ulcer Left plantar;lateral Foot (Active)   04/08/22 1137    Pre-existing: Yes   Primary Wound Type: Diabetic ulc   Side: Left   Orientation: plantar;lateral   Location: Foot   Wound Number:    Ankle-Brachial Index:    Pulses:    Removal Indication and Assessment:    Wound Outcome:    (Retired) Wound Type:    (Retired) Wound Length (cm):    (Retired) Wound Width (cm):    (Retired) Depth (cm):    Wound Description (Comments):    Removal Indications:    Wound Image   09/24/24 1638   Wound WDL ex 09/24/24 1638   Dressing Appearance Intact;Moist drainage 09/24/24 1638   Drainage Amount Moderate 09/24/24 1638   Drainage Characteristics/Odor Serosanguineous;No odor 09/24/24 1638   Appearance Pink;Moist 09/24/24 1638   Tissue loss description Full thickness 09/24/24 1638   Black (%), Wound Tissue Color 0 % 09/24/24 1638   Red (%), Wound Tissue Color 100 % 09/24/24 1638   Yellow (%), Wound Tissue Color 0 % 09/24/24 1638   Periwound Area Pale white;Moist;Macerated 09/24/24 1638   Wound Edges Defined;Open 09/24/24 1638   Wound Length (cm) 6.5 cm 09/24/24 1638   Wound Width (cm) 2.4 cm 09/24/24 1638   Wound Depth (cm) 0.2 cm 09/24/24 1638   Wound Volume (cm^3) 3.12  "cm^3 09/24/24 1638   Wound Surface Area (cm^2) 15.6 cm^2 09/24/24 1638   Tunneling (depth (cm)/location) 0 09/24/24 1638   Undermining (depth (cm)/location) 0 09/24/24 1638   Care Cleansed with:;Antimicrobial agent;Sterile normal saline 09/24/24 1638   Dressing Changed 09/24/24 1638   Periwound Care Moisture barrier applied;Absorptive dressing applied 09/24/24 1638   Compression Tubular elasticized bandage 09/24/24 1638   Off Loading Football dressing;Off loading shoe 09/24/24 1638   Dressing Change Due 09/27/24 09/24/24 1638           Plan:     Wound Dressing Orders       Dressing change frequency twice a week (prn nurse visit)   Remove old dressing   Cleanse with: Hibiclens and Normal Saline, Vashe soak x 10 minutes (may use Vinegar soak x 10 minutes at nurse visit if odorous)     Left Plantar Foot   Protect periwound with:  Gentian Violet, areas of maceration and hugging wound bed   Primary dressing: WOUND BASE: Endoform then Silvercel (2 layers - secured with Steri-strips pulling lateral aspect towards wound bed)   Secondary dressing:  Mextra 5" x 9" laid vertically.  Abram foam long x 2 (laid vertically)   Compression/Off-load: Open-Toe Football (cast padding x 2 rolls). Tubigrip, size D, double layer (calf size 35 cm) CAM Boot on the affected foot           Follow up in about 3 days (around 9/27/2024) for Wound Care.          "

## 2024-09-24 NOTE — PROGRESS NOTES
Patient arrives ambulatory with assistive device for PICC removal as ordered - PICC removed without difficulty from right upper medial aspect of arm - measurement marked at 41 cm with entire catheter intact without compromise noted - sterile vaseline gauze placed and large co-flex wrap with instructions to leave dry and in place x 24 hours . No signs of infection noted to site - no swelling or drainage - Will monitor on unit for 30 minutes as per protocol    Labs drawn per order.

## 2024-09-25 ENCOUNTER — TELEPHONE (OUTPATIENT)
Dept: PULMONOLOGY | Facility: CLINIC | Age: 54
End: 2024-09-25
Payer: MEDICARE

## 2024-09-25 NOTE — TELEPHONE ENCOUNTER
Pt missed  virtual visit today. Called pt and left voice message. Antibiotics were stopped yesterday (s/p 8 wks IV dapto) and picc line removed. Per chart, wound looks like it has continued to improve. If wound worsens will need repeat biopsy (with cultures) to help guide antibiotic therapy.

## 2024-09-26 NOTE — PROGRESS NOTES
"[facility]                Progress Note    Subjective:       Patient ID: Indio Ryder Jr. is a 54 y.o. male.    Chief Complaint: Wound Check, Diabetic Foot Ulcer, and Dressing Change    Follow Up wound care visit. Patient ambulated with assistance of knee scooter for LLE to [facility]. Prescribed CAM Boot on Left Foot. Patient denies fever, chills, nausea, vomiting or diarrhea at present. Patient denies pain or discomfort to wound bed at present. Patient reports not having any issues with dressing  since last visit. Dressing appears intact without strikethrough drainage. Dressing removed & wound cleaned by nurse. Left Plantar Foot wound appears pink and moist. Dressing applied per DPM order, additional padding applied d/t patient reporting "driving to an away game" this weekend. DPM reminded patient to elevate LLE often when not driving and when seated. Patient will return to [facility] for a nurse visit in 4 days and then to see DPM in 1 week. Patient declined AVS, has MyChart.       Review of Systems      Objective:        Physical Exam    Vitals:    09/27/24 0900   BP: (!) 148/80   Pulse: 84   Resp: 16   Temp: 98 °F (36.7 °C)       Assessment:           ICD-10-CM ICD-9-CM   1. Acute on chronic osteomyelitis  M86.10 730.00    M86.60 730.10   2. Diabetic ulcer of left midfoot associated with type 2 diabetes mellitus, with bone involvement without evidence of necrosis  E11.621 250.80    L97.426 707.14            (Retired) Wound 04/08/22 1137 Diabetic Ulcer Left plantar;lateral Foot (Active)   04/08/22 1137    Pre-existing: Yes   Primary Wound Type: Diabetic ulc   Side: Left   Orientation: plantar;lateral   Location: Foot   Wound Number:    Ankle-Brachial Index:    Pulses:    Removal Indication and Assessment:    Wound Outcome:    (Retired) Wound Type:    (Retired) Wound Length (cm):    (Retired) Wound Width (cm):    (Retired) Depth (cm):    Wound Description (Comments):  "   Removal Indications:    Wound Image    09/27/24 0910   Wound WDL ex 09/27/24 0910   Dressing Appearance Intact;Moist drainage 09/27/24 0910   Drainage Amount Moderate 09/27/24 0910   Drainage Characteristics/Odor Serosanguineous 09/27/24 0910   Appearance Pink;Moist 09/27/24 0910   Tissue loss description Partial thickness 09/27/24 0910   Black (%), Wound Tissue Color 0 % 09/27/24 0910   Red (%), Wound Tissue Color 100 % 09/27/24 0910   Yellow (%), Wound Tissue Color 0 % 09/27/24 0910   Periwound Area Pale white;Moist 09/27/24 0910   Wound Edges Defined;Open 09/27/24 0910   Wound Length (cm) 6 cm 09/27/24 0910   Wound Width (cm) 2.1 cm 09/27/24 0910   Wound Depth (cm) 0.2 cm 09/27/24 0910   Wound Volume (cm^3) 2.52 cm^3 09/27/24 0910   Wound Surface Area (cm^2) 12.6 cm^2 09/27/24 0910   Tunneling (depth (cm)/location) 0 09/27/24 0910   Undermining (depth (cm)/location) 0 09/27/24 0910   Care Cleansed with:;Antimicrobial agent;Sterile normal saline;Debrided;Wound cleanser 09/27/24 0910   Dressing Changed 09/27/24 0910   Periwound Care Moisture barrier applied;Absorptive dressing applied 09/27/24 0910   Compression Tubular elasticized bandage 09/27/24 0910   Off Loading Football dressing;Off loading shoe 09/27/24 0910   Dressing Change Due 10/01/24 09/27/24 0910           Plan:              Tissue pathology and/or culture taken     [] Yes      [x] No  Sharp debridement performed                   [x] Yes       [] No  Labs ordered     [] Yes       [x] No  Imaging ordered    [] Yes      [x] No    Orders Placed This Encounter   Procedures    Change dressing     Wound Dressing Orders      Dressing change frequency twice a week (prn nurse visit)  Remove old dressing  Cleanse with: Hibiclens and Normal Saline, Vashe soak x 10 minutes (may use Vinegar soak x 10 minutes at nurse visit if odorous)    Left Plantar Foot  Protect periwound with:  Gentian Violet, areas of maceration and hugging wound bed  Primary dressing: WOUND  "BASE: Endoform then Silvercel (2 layers - secured with Steri-strips pulling lateral aspect towards wound bed)  Secondary dressing:  Mextra 5" x 9" laid vertically.  Abram foam long x 4 (3 pads laid vertically then 1 pad folded and applied over wound bed to cushion)  Compression/Off-load: Open-Toe Football (cast padding x 2 rolls). Tubigrip, size D, double layer (calf size 35.5 cm) CAM Boot on the affected foot     Okay to apply 2 Abram foams at Nurse Visit unless any DTI's noted. Return to clinic in 4 days for nurse visit.        Follow up in about 4 days (around 10/1/2024) for Nurse Visit.       " "9/27/2024 9:00 AM    Performed by: Marivel Peterson DPM  Authorized by: Marivel Peterson DPM      Wound Details:    Location:  Left foot    Location:  Left Plantar    Type of Debridement:  Excisional       Length (cm):  6       Area (sq cm):  12.6       Width (cm):  2.1       Percent Debrided (%):  100       Depth (cm):  0.2       Total Area Debrided (sq cm):  12.6    Depth of debridement:  Subcutaneous tissue    Tissue debrided:  Dermis, Epidermis and Subcutaneous    Devitalized tissue debrided:  Callus and Fibrin    Instruments:  Curette  Bleeding:  Minimal  Hemostasis Achieved: Yes  Method Used:  Pressure  Patient tolerance:  Patient tolerated the procedure well with no immediate complications      Tissue pathology and/or culture taken     [] Yes      [x] No  Sharp debridement performed                   [x] Yes       [] No  Labs ordered     [] Yes       [x] No  Imaging ordered    [] Yes      [x] No    Orders Placed This Encounter   Procedures    Debridement     This order was created via procedure documentation     Standing Status:   Standing     Number of Occurrences:   1    Change dressing     Wound Dressing Orders      Dressing change frequency twice a week (prn nurse visit)  Remove old dressing  Cleanse with: Hibiclens and Normal Saline, Vashe soak x 10 minutes (may use Vinegar soak x 10 minutes at nurse visit if odorous)    Left Plantar Foot  Protect periwound with:  Gentian Violet, areas of maceration and hugging wound bed  Primary dressing: WOUND BASE: Endoform then Silvercel (2 layers - secured with Steri-strips pulling lateral aspect towards wound bed)  Secondary dressing:  Mextra 5" x 9" laid vertically.  Abram foam long x 4 (3 pads laid vertically then 1 pad folded and applied over wound bed to cushion)  Compression/Off-load: Open-Toe Football (cast padding x 2 rolls). Tubigrip, size D, double layer (calf size 35.5 cm) CAM Boot on the affected foot     Okay to apply 2 Abram foams at Nurse Visit " unless any DTI's noted. Return to clinic in 4 days for nurse visit.        Follow up in about 4 days (around 10/1/2024) for Nurse Visit.

## 2024-09-27 ENCOUNTER — HOSPITAL ENCOUNTER (OUTPATIENT)
Dept: WOUND CARE | Facility: HOSPITAL | Age: 54
Discharge: HOME | End: 2024-09-27
Attending: PODIATRIST
Payer: MEDICARE

## 2024-09-27 VITALS
TEMPERATURE: 98 F | SYSTOLIC BLOOD PRESSURE: 148 MMHG | HEART RATE: 84 BPM | RESPIRATION RATE: 16 BRPM | DIASTOLIC BLOOD PRESSURE: 80 MMHG

## 2024-09-27 DIAGNOSIS — M86.60 ACUTE ON CHRONIC OSTEOMYELITIS: Primary | ICD-10-CM

## 2024-09-27 DIAGNOSIS — E11.621 DIABETIC ULCER OF LEFT MIDFOOT ASSOCIATED WITH TYPE 2 DIABETES MELLITUS, WITH BONE INVOLVEMENT WITHOUT EVIDENCE OF NECROSIS: ICD-10-CM

## 2024-09-27 DIAGNOSIS — L97.426 DIABETIC ULCER OF LEFT MIDFOOT ASSOCIATED WITH TYPE 2 DIABETES MELLITUS, WITH BONE INVOLVEMENT WITHOUT EVIDENCE OF NECROSIS: ICD-10-CM

## 2024-09-27 DIAGNOSIS — M86.10 ACUTE ON CHRONIC OSTEOMYELITIS: Primary | ICD-10-CM

## 2024-09-27 PROCEDURE — 11042 DBRDMT SUBQ TIS 1ST 20SQCM/<: CPT | Mod: HCNC | Performed by: PODIATRIST

## 2024-09-27 PROCEDURE — 99499 UNLISTED E&M SERVICE: CPT | Mod: HCNC,,, | Performed by: PODIATRIST

## 2024-09-27 PROCEDURE — 11042 DBRDMT SUBQ TIS 1ST 20SQCM/<: CPT | Mod: HCNC,,, | Performed by: PODIATRIST

## 2024-10-01 ENCOUNTER — HOSPITAL ENCOUNTER (OUTPATIENT)
Dept: WOUND CARE | Facility: HOSPITAL | Age: 54
Discharge: HOME | End: 2024-10-01
Payer: MEDICARE

## 2024-10-01 VITALS
DIASTOLIC BLOOD PRESSURE: 66 MMHG | TEMPERATURE: 98 F | RESPIRATION RATE: 15 BRPM | HEART RATE: 95 BPM | SYSTOLIC BLOOD PRESSURE: 111 MMHG

## 2024-10-01 DIAGNOSIS — E11.621 DIABETIC ULCER OF LEFT MIDFOOT ASSOCIATED WITH TYPE 2 DIABETES MELLITUS, WITH BONE INVOLVEMENT WITHOUT EVIDENCE OF NECROSIS: ICD-10-CM

## 2024-10-01 DIAGNOSIS — M86.10 ACUTE ON CHRONIC OSTEOMYELITIS: Primary | ICD-10-CM

## 2024-10-01 DIAGNOSIS — M86.60 ACUTE ON CHRONIC OSTEOMYELITIS: Primary | ICD-10-CM

## 2024-10-01 DIAGNOSIS — L97.426 DIABETIC ULCER OF LEFT MIDFOOT ASSOCIATED WITH TYPE 2 DIABETES MELLITUS, WITH BONE INVOLVEMENT WITHOUT EVIDENCE OF NECROSIS: ICD-10-CM

## 2024-10-01 PROCEDURE — 99213 OFFICE O/P EST LOW 20 MIN: CPT | Mod: HCNC

## 2024-10-01 NOTE — PROGRESS NOTES
[facility]  [address]  [id number]  Nurse Visit    Subjective:       Patient seen in clinic today. Patient ambulated with assistance of knee scooter for LLE to [facility]. Prescribed CAM Boot on left Foot. Patient denies fever, chills, nausea, vomiting or diarrhea at present. Patient c/o pain to wound bed at present. Dressing appears intact without strikethrough drainage. Dressing removed & wound cleaned by nurse.  Left Plantar Foot wound appears pink, red and moist. Patient declined AVS, has MyChart.  Dressing changed as ordered.      Assessment:          (Retired) Wound 04/08/22 1137 Diabetic Ulcer Left plantar;lateral Foot (Active)   04/08/22 1137    Pre-existing: Yes   Primary Wound Type: Diabetic ulc   Side: Left   Orientation: plantar;lateral   Location: Foot   Wound Number:    Ankle-Brachial Index:    Pulses:    Removal Indication and Assessment:    Wound Outcome:    (Retired) Wound Type:    (Retired) Wound Length (cm):    (Retired) Wound Width (cm):    (Retired) Depth (cm):    Wound Description (Comments):    Removal Indications:    Wound Image   10/01/24 1600   Wound WDL ex 10/01/24 1600   Dressing Appearance Intact;Moist drainage 10/01/24 1600   Drainage Amount Moderate 10/01/24 1600   Drainage Characteristics/Odor Serosanguineous;No odor 10/01/24 1600   Appearance Pink;Red;Moist 10/01/24 1600   Tissue loss description Full thickness 10/01/24 1600   Black (%), Wound Tissue Color 0 % 10/01/24 1600   Red (%), Wound Tissue Color 100 % 10/01/24 1600   Yellow (%), Wound Tissue Color 0 % 10/01/24 1600   Periwound Area Pale white;Moist;Macerated 10/01/24 1600   Wound Edges Defined;Open 10/01/24 1600   Wound Length (cm) 6 cm 10/01/24 1600   Wound Width (cm) 2 cm 10/01/24 1600   Wound Depth (cm) 0.3 cm 10/01/24 1600   Wound Volume (cm^3) 3.6 cm^3 10/01/24 1600   Wound Surface Area (cm^2) 12 cm^2 10/01/24 1600   Tunneling (depth (cm)/location) 0 10/01/24 1600   Undermining (depth  "(cm)/location) 0 10/01/24 1600   Care Cleansed with:;Antimicrobial agent;Sterile normal saline;Applied:;Steri-strips 10/01/24 1600   Dressing Changed 10/01/24 1600   Periwound Care Moisture barrier applied;Absorptive dressing applied 10/01/24 1600   Compression Tubular elasticized bandage 10/01/24 1600   Off Loading Football dressing;Off loading shoe 10/01/24 1600   Dressing Change Due 10/04/24 10/01/24 1600           Plan:     Wound Dressing Orders      Dressing change frequency twice a week (prn nurse visit)  Remove old dressing  Cleanse with: Hibiclens and Normal Saline, Vashe soak x 10 minutes )    Left Plantar Foot  Protect periwound with:  Gentian Violet, areas of maceration and hugging wound bed  Primary dressing: WOUND BASE: Endoform then Silvercel (2 layers - secured with Steri-strips pulling lateral aspect towards wound bed) then custom [place] Pad (with hole to allow for drainage to Mextra)  Secondary dressing:  Mextra 5" x 9" laid vertically.  Abram foam long (1 pad laid vertically, 2nd pad folded then laid over wound bed)  Compression/Off-load: Open-Toe Football (cast padding x 2 rolls). Tubigrip, size D, double layer (calf size 35.5 cm) CAM Boot on the affected foot             Follow up in about 3 days (around 10/4/2024) for Wound Care.        "

## 2024-10-04 ENCOUNTER — HOSPITAL ENCOUNTER (OUTPATIENT)
Dept: WOUND CARE | Facility: HOSPITAL | Age: 54
Discharge: HOME | End: 2024-10-04
Attending: PODIATRIST
Payer: MEDICARE

## 2024-10-04 VITALS
TEMPERATURE: 97 F | SYSTOLIC BLOOD PRESSURE: 143 MMHG | RESPIRATION RATE: 14 BRPM | DIASTOLIC BLOOD PRESSURE: 87 MMHG | HEART RATE: 84 BPM

## 2024-10-04 DIAGNOSIS — L97.426 DIABETIC ULCER OF LEFT MIDFOOT ASSOCIATED WITH TYPE 2 DIABETES MELLITUS, WITH BONE INVOLVEMENT WITHOUT EVIDENCE OF NECROSIS: ICD-10-CM

## 2024-10-04 DIAGNOSIS — M86.60 ACUTE ON CHRONIC OSTEOMYELITIS: Primary | ICD-10-CM

## 2024-10-04 DIAGNOSIS — M86.10 ACUTE ON CHRONIC OSTEOMYELITIS: Primary | ICD-10-CM

## 2024-10-04 DIAGNOSIS — E11.621 DIABETIC ULCER OF LEFT MIDFOOT ASSOCIATED WITH TYPE 2 DIABETES MELLITUS, WITH BONE INVOLVEMENT WITHOUT EVIDENCE OF NECROSIS: ICD-10-CM

## 2024-10-04 PROCEDURE — 11042 DBRDMT SUBQ TIS 1ST 20SQCM/<: CPT | Mod: HCNC,,, | Performed by: PODIATRIST

## 2024-10-04 PROCEDURE — 11042 DBRDMT SUBQ TIS 1ST 20SQCM/<: CPT | Mod: HCNC | Performed by: PODIATRIST

## 2024-10-04 PROCEDURE — 99499 UNLISTED E&M SERVICE: CPT | Mod: HCNC,,, | Performed by: PODIATRIST

## 2024-10-04 NOTE — PROGRESS NOTES
[facility]                Progress Note    Subjective:       Patient ID: Indio Ryder Jr. is a 54 y.o. male.    Chief Complaint: Wound Care, Diabetic Foot Ulcer, and Dressing Change    Follow Up wound care visit. Patient ambulated with assistance of knee scooter for LLE to [facility]. Prescribed CAM Boot on left Foot. Patient denies fever, chills, nausea, vomiting or diarrhea at present. Patient denies pain or discomfort to wound bed at present. Patient reports not having any issues with dressing since last visit. Dressing appears intact without strikethrough drainage. Dressing removed & wound cleaned by nurse. Left Plantar Foot wound is continuing to show improvement as measurements in length and width are smaller. Patient's A1c was highly elevated, patient states since stopping ABX he has restarted taking Insulin therapy as ordered. He has not received further instructions from ID on additional ABX therapy and states he will contact to discuss.     No change to dressing order; applied per DPM order. Patient will return to [facility] on Tuesday for a nurse visit then to see DPM in 1 week. Patient declined AVS, has MyChart.         Review of Systems      Objective:        Physical Exam    Vitals:    10/04/24 0820   BP: (!) 143/87   Pulse: 84   Resp: 14   Temp: 97.1 °F (36.2 °C)       Assessment:           ICD-10-CM ICD-9-CM   1. Acute on chronic osteomyelitis  M86.10 730.00    M86.60 730.10   2. Diabetic ulcer of left midfoot associated with type 2 diabetes mellitus, with bone involvement without evidence of necrosis  E11.621 250.80    L97.426 707.14            (Retired) Wound 04/08/22 1137 Diabetic Ulcer Left plantar;lateral Foot (Active)   04/08/22 1137    Pre-existing: Yes   Primary Wound Type: Diabetic ulc   Side: Left   Orientation: plantar;lateral   Location: Foot   Wound Number:    Ankle-Brachial Index:    Pulses:    Removal Indication and Assessment:    Wound Outcome:     (Retired) Wound Type:    (Retired) Wound Length (cm):    (Retired) Wound Width (cm):    (Retired) Depth (cm):    Wound Description (Comments):    Removal Indications:    Wound Image    10/04/24 0858   Dressing Appearance Intact;Moist drainage 10/04/24 0858   Drainage Amount Moderate 10/04/24 0858   Drainage Characteristics/Odor Serosanguineous;No odor 10/04/24 0858   Appearance Pink;Red;Moist 10/04/24 0858   Tissue loss description Full thickness 10/04/24 0858   Black (%), Wound Tissue Color 0 % 10/04/24 0858   Red (%), Wound Tissue Color 100 % 10/04/24 0858   Yellow (%), Wound Tissue Color 0 % 10/04/24 0858   Periwound Area Pale white;Moist;Macerated 10/04/24 0858   Wound Edges Defined;Open 10/04/24 0858   Wound Length (cm) 5.8 cm 10/04/24 0858   Wound Width (cm) 1.8 cm 10/04/24 0858   Wound Depth (cm) 0.3 cm 10/04/24 0858   Wound Volume (cm^3) 3.132 cm^3 10/04/24 0858   Wound Surface Area (cm^2) 10.44 cm^2 10/04/24 0858   Tunneling (depth (cm)/location) 0 10/04/24 0858   Undermining (depth (cm)/location) 0 10/04/24 0858   Care Cleansed with:;Antimicrobial agent;Sterile normal saline;Debrided;Wound cleanser;Applied:;Steri-strips 10/04/24 0858   Dressing Changed 10/04/24 0858   Periwound Care Moisture barrier applied;Absorptive dressing applied 10/04/24 0858   Compression Tubular elasticized bandage 10/04/24 0858   Off Loading Football dressing;Off loading shoe 10/04/24 0858   Dressing Change Due 10/08/24 10/04/24 0858           Plan:              Tissue pathology and/or culture taken     [] Yes      [x] No  Sharp debridement performed                   [x] Yes       [] No  Labs ordered     [] Yes       [x] No  Imaging ordered    [] Yes      [x] No    Orders Placed This Encounter   Procedures    Change dressing     Wound Dressing Orders       Dressing change frequency twice a week (prn nurse visit)   Remove old dressing   Cleanse with: Hibiclens and Normal Saline, Vashe soak x 10 minutes (may use Vinegar soak x 10  "minutes at nurse visit if odorous)     Left 1st Distal Toe:  Abram Foam Toe ball to cushion DTI area then secure with cast padding    Left Plantar Foot   Protect periwound with:  Gentian Violet, areas of maceration and hugging wound bed   Primary dressing: WOUND BASE: Silvercel (folded twice then secured with Steri-strips pulling lateral aspect of foot towards wound bed)   Secondary dressing:  Mextra 5" x 9" laid vertically.  Abram foam long x 4 (3 pads laid vertically then 1 pad folded and applied over wound bed to cushion)   Compression/Off-load: Open-Toe Football (cast padding x 2 rolls). Tubigrip, size D, double layer (calf size 35.5 cm) CAM Boot on the affected foot      Apply Endoform to wound bed at nurse visit. Okay to apply 2 Abram foams at Nurse Visit unless any DTI's noted then apply 3 Abram foams.     Return to clinic in 4 days for nurse visit.        Follow up in about 4 days (around 10/8/2024) for Nurse Visit.       " stopping ABX he has restarted taking Insulin therapy as ordered.     He has not received further instructions from ID on additional ABX therapy and states he will contact to discuss.     No change to dressing order; applied per DPM order.    Patient instructed to elevate LLE often when not driving and when seated. Patient will return to [facility] for a nurse visit on Tuesday  and then to see me in 1 week. Patient declined AVS, has MyChart.     Wound Debridement    Date/Time: 10/4/2024 9:00 AM    Performed by: Marivel Peterson DPM  Authorized by: Marivel Peterson DPM      Wound Details:    Location:  Left foot    Location:  Left Plantar    Type of Debridement:  Excisional       Length (cm):  5.8       Area (sq cm):  10.44       Width (cm):  1.8       Percent Debrided (%):  100       Depth (cm):  0.3       Total Area Debrided (sq cm):  10.44    Depth of debridement:  Subcutaneous tissue    Tissue debrided:  Dermis, Epidermis and Subcutaneous    Devitalized tissue debrided:  Callus and Fibrin    Instruments:  Curette  Bleeding:  Minimal  Hemostasis Achieved: Yes  Method Used:  Pressure and Silver Nitrate  Patient tolerance:  Patient tolerated the procedure well with no immediate complications      Tissue pathology and/or culture taken     [] Yes      [x] No  Sharp debridement performed                   [x] Yes       [] No  Labs ordered     [] Yes       [x] No  Imaging ordered    [] Yes      [x] No    Orders Placed This Encounter   Procedures    Change dressing     Wound Dressing Orders       Dressing change frequency twice a week (prn nurse visit)   Remove old dressing   Cleanse with: Hibiclens and Normal Saline, Vashe soak x 10 minutes (may use Vinegar soak x 10 minutes at nurse visit if odorous)     Left 1st Distal Toe:  Abram Foam Toe ball to cushion DTI area then secure with cast padding    Left Plantar Foot   Protect periwound with:  Gentian Violet, areas of maceration and hugging wound bed   Primary dressing: WOUND  "BASE: Silvercel (folded twice then secured with Steri-strips pulling lateral aspect of foot towards wound bed)   Secondary dressing:  Mextra 5" x 9" laid vertically.  Abram foam long x 4 (3 pads laid vertically then 1 pad folded and applied over wound bed to cushion)   Compression/Off-load: Open-Toe Football (cast padding x 2 rolls). Tubigrip, size D, double layer (calf size 35.5 cm) CAM Boot on the affected foot      Apply Endoform to wound bed at nurse visit. Okay to apply 2 Abram foams at Nurse Visit unless any DTI's noted then apply 3 Abram foams.     Return to clinic in 4 days for nurse visit.        Follow up in about 4 days (around 10/8/2024) for Nurse Visit.       "

## 2024-10-07 PROBLEM — E11.3213 TYPE 2 DIABETES MELLITUS WITH BOTH EYES AFFECTED BY MILD NONPROLIFERATIVE RETINOPATHY AND MACULAR EDEMA, WITH LONG-TERM CURRENT USE OF INSULIN: Status: ACTIVE | Noted: 2021-03-23

## 2024-10-07 PROBLEM — Z79.4 TYPE 2 DIABETES MELLITUS WITH BOTH EYES AFFECTED BY MILD NONPROLIFERATIVE RETINOPATHY AND MACULAR EDEMA, WITH LONG-TERM CURRENT USE OF INSULIN: Status: ACTIVE | Noted: 2021-03-23

## 2024-10-07 PROBLEM — C18.9 COLON ADENOCARCINOMA: Status: ACTIVE | Noted: 2024-10-07

## 2024-10-08 ENCOUNTER — HOSPITAL ENCOUNTER (OUTPATIENT)
Dept: WOUND CARE | Facility: HOSPITAL | Age: 54
Discharge: HOME | End: 2024-10-08
Attending: PODIATRIST
Payer: MEDICARE

## 2024-10-08 VITALS
HEART RATE: 111 BPM | DIASTOLIC BLOOD PRESSURE: 71 MMHG | SYSTOLIC BLOOD PRESSURE: 119 MMHG | RESPIRATION RATE: 14 BRPM | TEMPERATURE: 98 F

## 2024-10-08 DIAGNOSIS — E11.621 DIABETIC ULCER OF LEFT MIDFOOT ASSOCIATED WITH TYPE 2 DIABETES MELLITUS, WITH BONE INVOLVEMENT WITHOUT EVIDENCE OF NECROSIS: ICD-10-CM

## 2024-10-08 DIAGNOSIS — L97.426 DIABETIC ULCER OF LEFT MIDFOOT ASSOCIATED WITH TYPE 2 DIABETES MELLITUS, WITH BONE INVOLVEMENT WITHOUT EVIDENCE OF NECROSIS: ICD-10-CM

## 2024-10-08 DIAGNOSIS — M86.60 ACUTE ON CHRONIC OSTEOMYELITIS: Primary | ICD-10-CM

## 2024-10-08 DIAGNOSIS — M86.10 ACUTE ON CHRONIC OSTEOMYELITIS: Primary | ICD-10-CM

## 2024-10-08 PROCEDURE — 99213 OFFICE O/P EST LOW 20 MIN: CPT | Mod: HCNC

## 2024-10-08 NOTE — PROGRESS NOTES
[facility]  [address]  [address]  Nurse Visit    Subjective:       Patient seen in clinic today. Patient ambulated with assistance of knee scooter for LLE to [facility]. Prescribed CAM Boot on Left Foot. Patient denies fever, chills, nausea, vomiting or diarrhea at present. Patient denies pain or discomfort to wound bed at present. Dressing appears intact without strikethrough drainage. Dressing removed & wound cleaned by nurse.  Left Plantar Foot wound appears pink and moist, measures larger and able to probe bone in proximal wound with undermining noted from 3 to 7 o'clock (toes are 12 o'clock). Patient declined AVS, has MyChart.  Dressing changed as ordered. Patient states he has not contact ID as of today, reminded to call regarding ABX therapy if needed.       Assessment:          (Retired) Wound 04/08/22 1137 Diabetic Ulcer Left plantar;lateral Foot (Active)   04/08/22 1137    Pre-existing: Yes   Primary Wound Type: Diabetic [facility]   Side: Left   Orientation: plantar;lateral   Location: Foot   Wound Number:    Ankle-Brachial Index:    Pulses:    Removal Indication and Assessment:    Wound Outcome:    (Retired) Wound Type:    (Retired) Wound Length (cm):    (Retired) Wound Width (cm):    (Retired) Depth (cm):    Wound Description (Comments):    Removal Indications:    Wound Image   10/08/24 1607   Wound WDL ex 10/08/24 1607   Dressing Appearance Intact;Moist drainage 10/08/24 1607   Drainage Amount Moderate 10/08/24 1607   Drainage Characteristics/Odor Serosanguineous;No odor 10/08/24 1607   Appearance Pink;Red;Moist;Bone 10/08/24 1607   Tissue loss description Full thickness 10/08/24 1607   Black (%), Wound Tissue Color 0 % 10/08/24 1607   Red (%), Wound Tissue Color 100 % 10/08/24 1607   Yellow (%), Wound Tissue Color 0 % 10/08/24 1607   Periwound Area Pale white;Moist;Macerated 10/08/24 1607   Wound Edges Defined;Open 10/08/24 1607   Wound Length (cm) 6.2 cm 10/08/24 1607  "  Wound Width (cm) 2.2 cm 10/08/24 1607   Wound Depth (cm) 0.5 cm 10/08/24 1607   Wound Volume (cm^3) 6.82 cm^3 10/08/24 1607   Wound Surface Area (cm^2) 13.64 cm^2 10/08/24 1607   Tunneling (depth (cm)/location) 0 10/08/24 1607   Undermining (depth (cm)/location) 0.4 cm @ 3 to 7 o'clock (toes are 12 o'clock position) 10/08/24 1607   Care Cleansed with:;Antimicrobial agent;Sterile normal saline;Wound cleanser;Applied:;Steri-strips 10/08/24 1607   Dressing Changed 10/08/24 1607   Periwound Care Moisture barrier applied;Absorptive dressing applied 10/08/24 1607   Compression Tubular elasticized bandage 10/08/24 1607   Off Loading Football dressing;Off loading shoe 10/08/24 1607   Dressing Change Due 10/11/24 10/08/24 1607           Plan:     Wound Dressing Orders       Dressing change frequency twice a week (prn nurse visit)   Remove old dressing   Cleanse with: Hibiclens and Normal Saline, Vashe soak x 10 minutes (may use Vinegar soak x 10 minutes at nurse visit if odorous)      Left 1st Distal Toe:  Abram Foam Toe ball to cushion DTI area then secure with cast padding     Left Plantar Foot   Protect periwound with:  Gentian Violet, areas of maceration and hugging wound bed   Primary dressing: WOUND BASE: Endoform then Silvercel (folded twice then secured with Steri-strips pulling lateral aspect of foot towards wound bed)   Secondary dressing:  Mextra 5" x 9" laid vertically.  Abram foam long x 4 (3 pads laid vertically then 1/2 pad applied over wound bed to cushion)   Compression/Off-load: Open-Toe Football (cast padding x 2 rolls). Tubigrip, size D, double layer (calf size 35.5 cm) CAM Boot on the affected foot                Follow up in about 3 days (around 10/11/2024) for Wound Care.          "

## 2024-10-09 ENCOUNTER — TELEPHONE (OUTPATIENT)
Dept: INTERNAL MEDICINE | Facility: CLINIC | Age: 54
End: 2024-10-09
Payer: MEDICARE

## 2024-10-09 NOTE — TELEPHONE ENCOUNTER
----- Message from JESUS Leyva FNP sent at 10/8/2024  3:33 PM CDT -----  Regarding: holden  Poorly managed type 2 dm  Need reschedule  Thanks  Gigi

## 2024-10-11 ENCOUNTER — HOSPITAL ENCOUNTER (OUTPATIENT)
Dept: WOUND CARE | Facility: HOSPITAL | Age: 54
Discharge: HOME | End: 2024-10-11
Attending: PODIATRIST
Payer: MEDICARE

## 2024-10-11 ENCOUNTER — LAB VISIT (OUTPATIENT)
Dept: LAB | Facility: HOSPITAL | Age: 54
End: 2024-10-11
Attending: PODIATRIST
Payer: MEDICARE

## 2024-10-11 VITALS
RESPIRATION RATE: 18 BRPM | SYSTOLIC BLOOD PRESSURE: 140 MMHG | DIASTOLIC BLOOD PRESSURE: 81 MMHG | TEMPERATURE: 98 F | HEART RATE: 95 BPM

## 2024-10-11 DIAGNOSIS — M86.60 ACUTE ON CHRONIC OSTEOMYELITIS: ICD-10-CM

## 2024-10-11 DIAGNOSIS — L97.426 DIABETIC ULCER OF LEFT MIDFOOT ASSOCIATED WITH DIABETES MELLITUS DUE TO UNDERLYING CONDITION, WITH BONE INVOLVEMENT WITHOUT EVIDENCE OF NECROSIS: ICD-10-CM

## 2024-10-11 DIAGNOSIS — L97.426 DIABETIC ULCER OF LEFT MIDFOOT ASSOCIATED WITH TYPE 2 DIABETES MELLITUS, WITH BONE INVOLVEMENT WITHOUT EVIDENCE OF NECROSIS: ICD-10-CM

## 2024-10-11 DIAGNOSIS — E11.621 DIABETIC ULCER OF LEFT MIDFOOT ASSOCIATED WITH TYPE 2 DIABETES MELLITUS, WITH BONE INVOLVEMENT WITHOUT EVIDENCE OF NECROSIS: ICD-10-CM

## 2024-10-11 DIAGNOSIS — M86.60 ACUTE ON CHRONIC OSTEOMYELITIS: Primary | ICD-10-CM

## 2024-10-11 DIAGNOSIS — M86.10 ACUTE ON CHRONIC OSTEOMYELITIS: ICD-10-CM

## 2024-10-11 DIAGNOSIS — E08.621 DIABETIC ULCER OF LEFT MIDFOOT ASSOCIATED WITH DIABETES MELLITUS DUE TO UNDERLYING CONDITION, WITH BONE INVOLVEMENT WITHOUT EVIDENCE OF NECROSIS: ICD-10-CM

## 2024-10-11 DIAGNOSIS — M86.10 ACUTE ON CHRONIC OSTEOMYELITIS: Primary | ICD-10-CM

## 2024-10-11 DIAGNOSIS — M86.672 CHRONIC OSTEOMYELITIS OF LEFT FOOT: Primary | ICD-10-CM

## 2024-10-11 LAB
CRP SERPL-MCNC: 145.3 MG/L (ref 0–8.2)
ERYTHROCYTE [SEDIMENTATION RATE] IN BLOOD BY PHOTOMETRIC METHOD: 103 MM/HR (ref 0–23)

## 2024-10-11 PROCEDURE — 86140 C-REACTIVE PROTEIN: CPT | Mod: HCNC | Performed by: PODIATRIST

## 2024-10-11 PROCEDURE — 88311 DECALCIFY TISSUE: CPT | Mod: 26,HCNC,, | Performed by: PATHOLOGY

## 2024-10-11 PROCEDURE — 11044 DBRDMT BONE 1ST 20 SQ CM/<: CPT | Mod: HCNC | Performed by: PODIATRIST

## 2024-10-11 PROCEDURE — 11044 DBRDMT BONE 1ST 20 SQ CM/<: CPT | Mod: HCNC,,, | Performed by: PODIATRIST

## 2024-10-11 PROCEDURE — 88311 DECALCIFY TISSUE: CPT | Mod: HCNC | Performed by: PATHOLOGY

## 2024-10-11 PROCEDURE — 88307 TISSUE EXAM BY PATHOLOGIST: CPT | Mod: HCNC | Performed by: PATHOLOGY

## 2024-10-11 PROCEDURE — 85652 RBC SED RATE AUTOMATED: CPT | Mod: HCNC | Performed by: PODIATRIST

## 2024-10-11 PROCEDURE — 36415 COLL VENOUS BLD VENIPUNCTURE: CPT | Mod: HCNC | Performed by: PODIATRIST

## 2024-10-11 PROCEDURE — 99214 OFFICE O/P EST MOD 30 MIN: CPT | Mod: 25,HCNC,, | Performed by: PODIATRIST

## 2024-10-11 PROCEDURE — 87186 SC STD MICRODIL/AGAR DIL: CPT | Mod: 59,HCNC | Performed by: PODIATRIST

## 2024-10-11 PROCEDURE — 11047 DBRDMT BONE EACH ADDL: CPT | Mod: HCNC,,, | Performed by: PODIATRIST

## 2024-10-11 PROCEDURE — 87205 SMEAR GRAM STAIN: CPT | Mod: HCNC | Performed by: PODIATRIST

## 2024-10-11 PROCEDURE — 87075 CULTR BACTERIA EXCEPT BLOOD: CPT | Mod: HCNC | Performed by: PODIATRIST

## 2024-10-11 PROCEDURE — 87070 CULTURE OTHR SPECIMN AEROBIC: CPT | Mod: HCNC | Performed by: PODIATRIST

## 2024-10-11 PROCEDURE — 88307 TISSUE EXAM BY PATHOLOGIST: CPT | Mod: 26,HCNC,, | Performed by: PATHOLOGY

## 2024-10-11 NOTE — PROGRESS NOTES
Called patient to inform him of his lab results, recommended presentation to the emergency room.  He declines ate this time.  Currently also has dental infection which he believes my be cause of increased inflammatory markers and he is currently on antibiotics from his dentist.  Discussed signs of systematic infections.  Will call and check in on patient again in a few days.

## 2024-10-11 NOTE — PROGRESS NOTES
[facility]                Progress Note    Subjective:       Patient ID: Indio Ryder Jr. is a 54 y.o. male.    Chief Complaint: Dressing Change, Wound Check, and Wound Care    Follow Up wound care visit. Patient ambulated with assistance of knee scooter for LLE to [facility]. Prescribed CAM Boot on Left Foot. Patient denies fever, chills, nausea, vomiting or diarrhea at present. Patient denies pain or discomfort to wound bed at present. Patient reports not having any issues with dressing  since last visit. Dressing appears intact without strikethrough drainage. Dressing removed & wound cleaned by nurse.     Dressing Change    Wound Check    Review of Systems      Objective:        Physical Exam    Vitals:    10/11/24 1100   BP: (!) 140/81   Pulse: 95   Resp: 18   Temp: 97.6 °F (36.4 °C)       Assessment:           ICD-10-CM ICD-9-CM   1. Acute on chronic osteomyelitis  M86.10 730.00    M86.60 730.10   2. Diabetic ulcer of left midfoot associated with type 2 diabetes mellitus, with bone involvement without evidence of necrosis  E11.621 250.80    L97.426 707.14            (Retired) Wound 04/08/22 1137 Diabetic Ulcer Left plantar;lateral Foot (Active)   04/08/22 1137    Pre-existing: Yes   Primary Wound Type: Diabetic ulc   Side: Left   Orientation: plantar;lateral   Location: Foot   Wound Number:    Ankle-Brachial Index:    Pulses:    Removal Indication and Assessment:    Wound Outcome:    (Retired) Wound Type:    (Retired) Wound Length (cm):    (Retired) Wound Width (cm):    (Retired) Depth (cm):    Wound Description (Comments):    Removal Indications:    Wound Image   10/11/24 1102   Dressing Appearance Intact;Moist drainage 10/11/24 1102   Drainage Amount Moderate 10/11/24 1102   Drainage Characteristics/Odor Serosanguineous;No odor 10/11/24 1102   Appearance Pink;Red;Moist;Bone 10/11/24 1102   Tissue loss description Full thickness 10/11/24 1102   Red (%), Wound Tissue  Color 100 % 10/11/24 1102   Periwound Area Pale white;Moist;Macerated 10/11/24 1102   Wound Edges Open;Defined 10/11/24 1102   Wound Length (cm) 6.2 cm 10/11/24 1102   Wound Width (cm) 3.3 cm 10/11/24 1102   Wound Depth (cm) 0.4 cm 10/11/24 1102   Wound Volume (cm^3) 8.184 cm^3 10/11/24 1102   Wound Surface Area (cm^2) 20.46 cm^2 10/11/24 1102   Care Cleansed with:;Soap and water;Sterile normal saline 10/11/24 1102   Dressing Applied 10/11/24 1102   Periwound Care Absorptive dressing applied;Moisture barrier applied;Dry periwound area maintained 10/11/24 1102   Compression Tubular elasticized bandage 10/11/24 1102   Off Loading Football dressing;Off loading shoe 10/11/24 1102   Dressing Change Due 10/15/24 10/11/24 1102           Plan:              Tissue pathology and/or culture taken     [x] Yes      [] No  Sharp debridement performed                   [x] Yes       [] No  Labs ordered     [x] Yes       [] No  Imaging ordered    [] Yes      [x] No    Orders Placed This Encounter   Procedures    Gram stain     Standing Status:   Standing     Number of Occurrences:   1    Culture, Anaerobe     Standing Status:   Standing     Number of Occurrences:   1    Aerobic culture     Standing Status:   Standing     Number of Occurrences:   1    Sedimentation rate     Standing Status:   Future     Number of Occurrences:   1     Standing Expiration Date:   12/10/2025     Order Specific Question:   Send normal result to authorizing provider's In Basket if patient is active on MyChart:     Answer:   Yes    C-Reactive Protein     Standing Status:   Future     Number of Occurrences:   1     Standing Expiration Date:   12/10/2025     Order Specific Question:   Send normal result to authorizing provider's In Basket if patient is active on MyChart:     Answer:   Yes    Procalcitonin     Standing Status:   Standing     Number of Occurrences:   1    Change dressing     Wound Dressing Orders       Dressing change frequency twice a  "week (prn nurse visit)   Remove old dressing   Cleanse with: Hibiclens and Normal Saline, Vashe soak x 10 minutes (may use Vinegar soak x 10 minutes at nurse visit if odorous)     Left 1st Distal Toe:   Abram Foam Toe ball to cushion DTI area then secure with cast padding     Left Plantar Foot   Protect periwound with:  Gentian Violet, areas of maceration and hugging wound bed   Primary dressing: WOUND BASE: Endoform/Iodosorb (confetti mix to wound bed)   Secondary dressing:  Mextra 5" x 9" laid vertically.  Abram foam long x 3   Compression/Off-load: Closed-Toe Football (cast padding x 2 rolls). Tubigrip, size D, double layer (calf size 35.5 cm) CAM Boot on the affected foot      Okay to apply 2 Abram foams at Nurse Visit unless any DTI's noted then apply 3 Abram foams.     Return to clinic in 4 days for nurse visit.        Follow up in about 4 days (around 10/15/2024) for Nurse Visit.         " With patient's subjective of not feeling well.  Therefore labs have been ordered to assess for systemic infection.    Discussed with patient how uncontrolled diabetes can affect the neurovascular status of his  foot as well as his ability to heal wounds and fight infection.     He still has not received further instructions from ID on additional ABX therapy and states he will contact to discuss.     No change to dressing order; applied per DPM order.    Patient instructed to elevate LLE often when not driving and when seated. Patient will return to [facility] for a nurse visit on Tuesday  and then to see me in 1 week. Patient declined AVS, has MyChart.     Wound Debridement    Date/Time: 10/11/2024 9:00 AM    Performed by: Marivel Peterson DPM  Authorized by: Marivel Peterson DPM      Wound Details:    Location:  Left foot    Location:  Left Plantar    Type of Debridement:  Excisional       Length (cm):  6.2       Area (sq cm):  20.46       Width (cm):  3.3       Percent Debrided (%):  100       Depth (cm):  0.4       Total Area Debrided (sq cm):  20.46    Depth of debridement:  Bone    Tissue debrided:  Bone, Dermis, Epidermis and Subcutaneous    Devitalized tissue debrided:  Biofilm and Callus    Instruments:  Curette and Rongeur  Bleeding:  Moderate  Hemostasis Achieved: Yes  Method Used:  Pressure  Patient tolerance:  Patient tolerated the procedure well with no immediate complications  Specimen Collected: Specimen sent to pathology      Tissue pathology and/or culture taken     [x] Yes      [] No  Sharp debridement performed                   [x] Yes       [] No  Labs ordered     [] Yes       [x] No  Imaging ordered    [] Yes      [x] No    Orders Placed This Encounter   Procedures    Debridement     This order was created via procedure documentation     Standing Status:   Standing     Number of Occurrences:   1    Gram stain     Standing Status:   Standing     Number of Occurrences:   1    Culture, Anaerobe      "Standing Status:   Standing     Number of Occurrences:   1    Aerobic culture     Standing Status:   Standing     Number of Occurrences:   1    Sedimentation rate     Standing Status:   Future     Number of Occurrences:   1     Standing Expiration Date:   12/10/2025     Order Specific Question:   Send normal result to authorizing provider's In Basket if patient is active on MyChart:     Answer:   Yes    C-Reactive Protein     Standing Status:   Future     Number of Occurrences:   1     Standing Expiration Date:   12/10/2025     Order Specific Question:   Send normal result to authorizing provider's In Basket if patient is active on MyChart:     Answer:   Yes    Change dressing     Wound Dressing Orders       Dressing change frequency twice a week (prn nurse visit)   Remove old dressing   Cleanse with: Hibiclens and Normal Saline, Vashe soak x 10 minutes (may use Vinegar soak x 10 minutes at nurse visit if odorous)     Left 1st Distal Toe:   Abram Foam Toe ball to cushion DTI area then secure with cast padding     Left Plantar Foot   Protect periwound with:  Gentian Violet, areas of maceration and hugging wound bed   Primary dressing: WOUND BASE: Endoform/Iodosorb (confetti mix to wound bed)   Secondary dressing:  Mextra 5" x 9" laid vertically.  Abram foam long x 3   Compression/Off-load: Closed-Toe Football (cast padding x 2 rolls). Tubigrip, size D, double layer (calf size 35.5 cm) CAM Boot on the affected foot      Okay to apply 2 Abram foams at Nurse Visit unless any DTI's noted then apply 3 Abram foams.     Return to clinic in 4 days for nurse visit.        Follow up in about 4 days (around 10/15/2024) for Nurse Visit.       "

## 2024-10-12 LAB
BACTERIA SPEC AEROBE CULT: ABNORMAL
GRAM STN SPEC: NORMAL
GRAM STN SPEC: NORMAL

## 2024-10-13 LAB — BACTERIA SPEC AEROBE CULT: ABNORMAL

## 2024-10-14 ENCOUNTER — TELEPHONE (OUTPATIENT)
Dept: WOUND CARE | Facility: HOSPITAL | Age: 54
End: 2024-10-14
Payer: MEDICARE

## 2024-10-14 DIAGNOSIS — L97.426 DIABETIC ULCER OF LEFT MIDFOOT ASSOCIATED WITH DIABETES MELLITUS DUE TO UNDERLYING CONDITION, WITH BONE INVOLVEMENT WITHOUT EVIDENCE OF NECROSIS: ICD-10-CM

## 2024-10-14 DIAGNOSIS — E08.621 DIABETIC ULCER OF LEFT MIDFOOT ASSOCIATED WITH DIABETES MELLITUS DUE TO UNDERLYING CONDITION, WITH BONE INVOLVEMENT WITHOUT EVIDENCE OF NECROSIS: ICD-10-CM

## 2024-10-14 DIAGNOSIS — M86.672 CHRONIC OSTEOMYELITIS OF LEFT FOOT: Primary | ICD-10-CM

## 2024-10-14 RX ORDER — CIPROFLOXACIN 500 MG/1
500 TABLET ORAL 2 TIMES DAILY
Qty: 20 TABLET | Refills: 0 | Status: ON HOLD | OUTPATIENT
Start: 2024-10-14 | End: 2024-10-16

## 2024-10-14 NOTE — TELEPHONE ENCOUNTER
Spoke to patient, advised culture returned positive for bacteria. DPM has sent oral Cipro to the pharmacy for him to take BID x 10 days. Patient verbalized understanding.

## 2024-10-14 NOTE — TELEPHONE ENCOUNTER
----- Message from Marivel Peterson DPM sent at 10/14/2024  6:55 AM CDT -----  Culture result positive for bacteria.  Rx for Cipro 2 times per day for 10 days sent to his pharmacy

## 2024-10-15 ENCOUNTER — TELEPHONE (OUTPATIENT)
Dept: WOUND CARE | Facility: HOSPITAL | Age: 54
End: 2024-10-15
Payer: MEDICARE

## 2024-10-15 LAB
BACTERIA SPEC ANAEROBE CULT: NORMAL
FINAL PATHOLOGIC DIAGNOSIS: NORMAL
GROSS: NORMAL
Lab: NORMAL

## 2024-10-16 ENCOUNTER — HOSPITAL ENCOUNTER (INPATIENT)
Facility: HOSPITAL | Age: 54
LOS: 6 days | Discharge: HOME HEALTH | DRG: 853 | End: 2024-10-22
Attending: EMERGENCY MEDICINE | Admitting: INTERNAL MEDICINE
Payer: MEDICARE

## 2024-10-16 DIAGNOSIS — E11.65 TYPE 2 DIABETES MELLITUS WITH HYPERGLYCEMIA, WITH LONG-TERM CURRENT USE OF INSULIN: ICD-10-CM

## 2024-10-16 DIAGNOSIS — E11.10 DIABETIC KETOACIDOSIS WITHOUT COMA ASSOCIATED WITH TYPE 2 DIABETES MELLITUS: ICD-10-CM

## 2024-10-16 DIAGNOSIS — M86.60 ACUTE ON CHRONIC OSTEOMYELITIS: ICD-10-CM

## 2024-10-16 DIAGNOSIS — K92.0 GASTROINTESTINAL HEMORRHAGE WITH HEMATEMESIS: Primary | ICD-10-CM

## 2024-10-16 DIAGNOSIS — M86.10 ACUTE ON CHRONIC OSTEOMYELITIS: ICD-10-CM

## 2024-10-16 DIAGNOSIS — R78.81 BACTEREMIA: ICD-10-CM

## 2024-10-16 DIAGNOSIS — R00.0 TACHYCARDIA: ICD-10-CM

## 2024-10-16 DIAGNOSIS — Z79.4 TYPE 2 DIABETES MELLITUS WITH HYPERGLYCEMIA, WITH LONG-TERM CURRENT USE OF INSULIN: ICD-10-CM

## 2024-10-16 PROBLEM — K92.2 ACUTE GI BLEEDING: Status: ACTIVE | Noted: 2024-10-16

## 2024-10-16 LAB
ABO + RH BLD: NORMAL
ALBUMIN SERPL BCP-MCNC: 2.6 G/DL (ref 3.5–5.2)
ALLENS TEST: ABNORMAL
ALLENS TEST: ABNORMAL
ALLENS TEST: NORMAL
ALP SERPL-CCNC: 166 U/L (ref 55–135)
ALT SERPL W/O P-5'-P-CCNC: 8 U/L (ref 10–44)
AMORPH CRY UR QL COMP ASSIST: ABNORMAL
ANION GAP SERPL CALC-SCNC: 15 MMOL/L (ref 8–16)
ANION GAP SERPL CALC-SCNC: 21 MMOL/L (ref 8–16)
ANION GAP SERPL CALC-SCNC: 27 MMOL/L (ref 8–16)
APTT PPP: 28.2 SEC (ref 21–32)
AST SERPL-CCNC: 8 U/L (ref 10–40)
B-OH-BUTYR BLD STRIP-SCNC: 4.3 MMOL/L (ref 0–0.5)
BACTERIA #/AREA URNS AUTO: ABNORMAL /HPF
BASOPHILS # BLD AUTO: 0.01 K/UL (ref 0–0.2)
BASOPHILS # BLD AUTO: 0.01 K/UL (ref 0–0.2)
BASOPHILS # BLD AUTO: 0.02 K/UL (ref 0–0.2)
BASOPHILS # BLD AUTO: 0.02 K/UL (ref 0–0.2)
BASOPHILS NFR BLD: 0.1 % (ref 0–1.9)
BASOPHILS NFR BLD: 0.1 % (ref 0–1.9)
BASOPHILS NFR BLD: 0.2 % (ref 0–1.9)
BASOPHILS NFR BLD: 0.2 % (ref 0–1.9)
BILIRUB SERPL-MCNC: 0.2 MG/DL (ref 0.1–1)
BILIRUB UR QL STRIP: NEGATIVE
BLD GP AB SCN CELLS X3 SERPL QL: NORMAL
BUN SERPL-MCNC: 32 MG/DL (ref 6–20)
BUN SERPL-MCNC: 32 MG/DL (ref 6–20)
BUN SERPL-MCNC: 39 MG/DL (ref 6–20)
BUN SERPL-MCNC: 47 MG/DL (ref 6–30)
CA-I BLDV-SCNC: 1.23 MMOL/L (ref 1.06–1.42)
CA-I BLDV-SCNC: 1.26 MMOL/L (ref 1.06–1.42)
CA-I BLDV-SCNC: 1.3 MMOL/L (ref 1.06–1.42)
CALCIUM SERPL-MCNC: 10.4 MG/DL (ref 8.7–10.5)
CALCIUM SERPL-MCNC: 11.2 MG/DL (ref 8.7–10.5)
CALCIUM SERPL-MCNC: 9.2 MG/DL (ref 8.7–10.5)
CHLORIDE SERPL-SCNC: 112 MMOL/L (ref 95–110)
CHLORIDE SERPL-SCNC: 118 MMOL/L (ref 95–110)
CLARITY UR REFRACT.AUTO: CLEAR
CO2 SERPL-SCNC: 10 MMOL/L (ref 23–29)
CO2 SERPL-SCNC: 17 MMOL/L (ref 23–29)
CO2 SERPL-SCNC: 8 MMOL/L (ref 23–29)
COLOR UR AUTO: YELLOW
CREAT SERPL-MCNC: 1.6 MG/DL (ref 0.5–1.4)
CREAT SERPL-MCNC: 2.3 MG/DL (ref 0.5–1.4)
CREAT SERPL-MCNC: 2.5 MG/DL (ref 0.5–1.4)
CREAT SERPL-MCNC: 3.1 MG/DL (ref 0.5–1.4)
DELSYS: ABNORMAL
DIFFERENTIAL METHOD BLD: ABNORMAL
EOSINOPHIL # BLD AUTO: 0 K/UL (ref 0–0.5)
EOSINOPHIL NFR BLD: 0 % (ref 0–8)
ERYTHROCYTE [DISTWIDTH] IN BLOOD BY AUTOMATED COUNT: 14.7 % (ref 11.5–14.5)
ERYTHROCYTE [DISTWIDTH] IN BLOOD BY AUTOMATED COUNT: 14.7 % (ref 11.5–14.5)
ERYTHROCYTE [DISTWIDTH] IN BLOOD BY AUTOMATED COUNT: 14.9 % (ref 11.5–14.5)
ERYTHROCYTE [DISTWIDTH] IN BLOOD BY AUTOMATED COUNT: 16 % (ref 11.5–14.5)
EST. GFR  (NO RACE VARIABLE): 22.7 ML/MIN/1.73 M^2
EST. GFR  (NO RACE VARIABLE): 29.4 ML/MIN/1.73 M^2
EST. GFR  (NO RACE VARIABLE): 32.5 ML/MIN/1.73 M^2
ESTIMATED AVG GLUCOSE: 269 MG/DL (ref 68–131)
GLUCOSE SERPL-MCNC: 256 MG/DL (ref 70–110)
GLUCOSE SERPL-MCNC: 375 MG/DL (ref 70–110)
GLUCOSE SERPL-MCNC: 379 MG/DL (ref 70–110)
GLUCOSE SERPL-MCNC: 91 MG/DL (ref 70–110)
GLUCOSE UR QL STRIP: ABNORMAL
HBA1C MFR BLD: 11 % (ref 4–5.6)
HCO3 UR-SCNC: 18.4 MMOL/L (ref 24–28)
HCO3 UR-SCNC: 9.9 MMOL/L (ref 24–28)
HCT VFR BLD AUTO: 36.5 % (ref 40–54)
HCT VFR BLD AUTO: 42.5 % (ref 40–54)
HCT VFR BLD AUTO: 42.5 % (ref 40–54)
HCT VFR BLD AUTO: 43.9 % (ref 40–54)
HCT VFR BLD CALC: 36 %PCV (ref 36–54)
HCT VFR BLD CALC: 42 %PCV (ref 36–54)
HCV AB SERPL QL IA: NORMAL
HGB BLD-MCNC: 11.4 G/DL (ref 14–18)
HGB BLD-MCNC: 13.1 G/DL (ref 14–18)
HGB BLD-MCNC: 13.1 G/DL (ref 14–18)
HGB BLD-MCNC: 13.2 G/DL (ref 14–18)
HGB UR QL STRIP: ABNORMAL
HIV 1+2 AB+HIV1 P24 AG SERPL QL IA: NORMAL
HYALINE CASTS UR QL AUTO: 7 /LPF
IMM GRANULOCYTES # BLD AUTO: 0.04 K/UL (ref 0–0.04)
IMM GRANULOCYTES # BLD AUTO: 0.04 K/UL (ref 0–0.04)
IMM GRANULOCYTES # BLD AUTO: 0.05 K/UL (ref 0–0.04)
IMM GRANULOCYTES # BLD AUTO: 0.06 K/UL (ref 0–0.04)
IMM GRANULOCYTES NFR BLD AUTO: 0.4 % (ref 0–0.5)
IMM GRANULOCYTES NFR BLD AUTO: 0.5 % (ref 0–0.5)
IMM GRANULOCYTES NFR BLD AUTO: 0.6 % (ref 0–0.5)
IMM GRANULOCYTES NFR BLD AUTO: 0.7 % (ref 0–0.5)
INR PPP: 1 (ref 0.8–1.2)
KETONES UR QL STRIP: ABNORMAL
LDH SERPL L TO P-CCNC: 1.92 MMOL/L (ref 0.5–2.2)
LEUKOCYTE ESTERASE UR QL STRIP: NEGATIVE
LIPASE SERPL-CCNC: 26 U/L (ref 4–60)
LYMPHOCYTES # BLD AUTO: 0.6 K/UL (ref 1–4.8)
LYMPHOCYTES # BLD AUTO: 0.6 K/UL (ref 1–4.8)
LYMPHOCYTES # BLD AUTO: 0.7 K/UL (ref 1–4.8)
LYMPHOCYTES # BLD AUTO: 0.7 K/UL (ref 1–4.8)
LYMPHOCYTES NFR BLD: 6 % (ref 18–48)
LYMPHOCYTES NFR BLD: 7.7 % (ref 18–48)
LYMPHOCYTES NFR BLD: 7.8 % (ref 18–48)
LYMPHOCYTES NFR BLD: 8.7 % (ref 18–48)
MAGNESIUM SERPL-MCNC: 2.3 MG/DL (ref 1.6–2.6)
MAGNESIUM SERPL-MCNC: 2.4 MG/DL (ref 1.6–2.6)
MCH RBC QN AUTO: 28.5 PG (ref 27–31)
MCH RBC QN AUTO: 28.8 PG (ref 27–31)
MCH RBC QN AUTO: 28.8 PG (ref 27–31)
MCH RBC QN AUTO: 29 PG (ref 27–31)
MCHC RBC AUTO-ENTMCNC: 29.8 G/DL (ref 32–36)
MCHC RBC AUTO-ENTMCNC: 30.8 G/DL (ref 32–36)
MCHC RBC AUTO-ENTMCNC: 31.1 G/DL (ref 32–36)
MCHC RBC AUTO-ENTMCNC: 31.2 G/DL (ref 32–36)
MCV RBC AUTO: 92 FL (ref 82–98)
MCV RBC AUTO: 93 FL (ref 82–98)
MCV RBC AUTO: 94 FL (ref 82–98)
MCV RBC AUTO: 96 FL (ref 82–98)
MICROSCOPIC COMMENT: ABNORMAL
MONOCYTES # BLD AUTO: 0.3 K/UL (ref 0.3–1)
MONOCYTES # BLD AUTO: 0.6 K/UL (ref 0.3–1)
MONOCYTES # BLD AUTO: 0.7 K/UL (ref 0.3–1)
MONOCYTES # BLD AUTO: 0.8 K/UL (ref 0.3–1)
MONOCYTES NFR BLD: 3.2 % (ref 4–15)
MONOCYTES NFR BLD: 6.6 % (ref 4–15)
MONOCYTES NFR BLD: 6.9 % (ref 4–15)
MONOCYTES NFR BLD: 9.5 % (ref 4–15)
NEUTROPHILS # BLD AUTO: 6.7 K/UL (ref 1.8–7.7)
NEUTROPHILS # BLD AUTO: 7.3 K/UL (ref 1.8–7.7)
NEUTROPHILS # BLD AUTO: 7.4 K/UL (ref 1.8–7.7)
NEUTROPHILS # BLD AUTO: 9 K/UL (ref 1.8–7.7)
NEUTROPHILS NFR BLD: 81.8 % (ref 38–73)
NEUTROPHILS NFR BLD: 84.6 % (ref 38–73)
NEUTROPHILS NFR BLD: 86.9 % (ref 38–73)
NEUTROPHILS NFR BLD: 87.5 % (ref 38–73)
NITRITE UR QL STRIP: NEGATIVE
NRBC BLD-RTO: 0 /100 WBC
OHS QRS DURATION: 90 MS
OHS QTC CALCULATION: 484 MS
PCO2 BLDA: 27 MMHG (ref 35–45)
PCO2 BLDA: 42 MMHG (ref 35–45)
PH SMN: 7.17 [PH] (ref 7.35–7.45)
PH SMN: 7.25 [PH] (ref 7.35–7.45)
PH UR STRIP: 6 [PH] (ref 5–8)
PHOSPHATE SERPL-MCNC: 1.8 MG/DL (ref 2.7–4.5)
PHOSPHATE SERPL-MCNC: <1 MG/DL (ref 2.7–4.5)
PLATELET # BLD AUTO: 417 K/UL (ref 150–450)
PLATELET # BLD AUTO: 456 K/UL (ref 150–450)
PLATELET # BLD AUTO: 459 K/UL (ref 150–450)
PLATELET # BLD AUTO: 464 K/UL (ref 150–450)
PMV BLD AUTO: 10.2 FL (ref 9.2–12.9)
PMV BLD AUTO: 9.6 FL (ref 9.2–12.9)
PMV BLD AUTO: 9.6 FL (ref 9.2–12.9)
PMV BLD AUTO: 9.7 FL (ref 9.2–12.9)
PO2 BLDA: 33 MMHG (ref 40–60)
PO2 BLDA: 38 MMHG (ref 40–60)
POC BE: -19 MMOL/L
POC BE: -9 MMOL/L
POC IONIZED CALCIUM: 1.33 MMOL/L (ref 1.06–1.42)
POC IONIZED CALCIUM: 1.34 MMOL/L (ref 1.06–1.42)
POC SATURATED O2: 54 % (ref 95–100)
POC SATURATED O2: 59 % (ref 95–100)
POC TCO2 (MEASURED): 11 MMOL/L (ref 23–29)
POC TCO2: 11 MMOL/L (ref 24–29)
POC TCO2: 20 MMOL/L (ref 24–29)
POCT GLUCOSE: 115 MG/DL (ref 70–110)
POCT GLUCOSE: 129 MG/DL (ref 70–110)
POCT GLUCOSE: 204 MG/DL (ref 70–110)
POCT GLUCOSE: 223 MG/DL (ref 70–110)
POCT GLUCOSE: 296 MG/DL (ref 70–110)
POCT GLUCOSE: 311 MG/DL (ref 70–110)
POCT GLUCOSE: 386 MG/DL (ref 70–110)
POCT GLUCOSE: 83 MG/DL (ref 70–110)
POCT GLUCOSE: 85 MG/DL (ref 70–110)
POCT GLUCOSE: 87 MG/DL (ref 70–110)
POCT GLUCOSE: 94 MG/DL (ref 70–110)
POTASSIUM BLD-SCNC: 4.1 MMOL/L (ref 3.5–5.1)
POTASSIUM BLD-SCNC: 4.4 MMOL/L (ref 3.5–5.1)
POTASSIUM SERPL-SCNC: 2.9 MMOL/L (ref 3.5–5.1)
POTASSIUM SERPL-SCNC: 3.8 MMOL/L (ref 3.5–5.1)
POTASSIUM SERPL-SCNC: 4.4 MMOL/L (ref 3.5–5.1)
PROT SERPL-MCNC: 9.4 G/DL (ref 6–8.4)
PROT UR QL STRIP: ABNORMAL
PROTHROMBIN TIME: 11.4 SEC (ref 9–12.5)
RBC # BLD AUTO: 3.96 M/UL (ref 4.6–6.2)
RBC # BLD AUTO: 4.51 M/UL (ref 4.6–6.2)
RBC # BLD AUTO: 4.59 M/UL (ref 4.6–6.2)
RBC # BLD AUTO: 4.59 M/UL (ref 4.6–6.2)
RBC #/AREA URNS AUTO: 1 /HPF (ref 0–4)
SAMPLE: ABNORMAL
SAMPLE: NORMAL
SITE: ABNORMAL
SITE: ABNORMAL
SITE: NORMAL
SODIUM BLD-SCNC: 147 MMOL/L (ref 136–145)
SODIUM BLD-SCNC: 149 MMOL/L (ref 136–145)
SODIUM SERPL-SCNC: 147 MMOL/L (ref 136–145)
SODIUM SERPL-SCNC: 149 MMOL/L (ref 136–145)
SODIUM SERPL-SCNC: 150 MMOL/L (ref 136–145)
SP GR UR STRIP: 1.02 (ref 1–1.03)
SPECIMEN OUTDATE: NORMAL
SQUAMOUS #/AREA URNS AUTO: 1 /HPF
URN SPEC COLLECT METH UR: ABNORMAL
WBC # BLD AUTO: 10.3 K/UL (ref 3.9–12.7)
WBC # BLD AUTO: 8.21 K/UL (ref 3.9–12.7)
WBC # BLD AUTO: 8.41 K/UL (ref 3.9–12.7)
WBC # BLD AUTO: 8.65 K/UL (ref 3.9–12.7)
WBC #/AREA URNS AUTO: 1 /HPF (ref 0–5)
YEAST UR QL AUTO: ABNORMAL

## 2024-10-16 PROCEDURE — 84100 ASSAY OF PHOSPHORUS: CPT | Mod: 91,HCNC | Performed by: INTERNAL MEDICINE

## 2024-10-16 PROCEDURE — 87389 HIV-1 AG W/HIV-1&-2 AB AG IA: CPT | Mod: HCNC | Performed by: PHYSICIAN ASSISTANT

## 2024-10-16 PROCEDURE — 63600175 PHARM REV CODE 636 W HCPCS: Mod: HCNC | Performed by: STUDENT IN AN ORGANIZED HEALTH CARE EDUCATION/TRAINING PROGRAM

## 2024-10-16 PROCEDURE — 82330 ASSAY OF CALCIUM: CPT | Mod: HCNC

## 2024-10-16 PROCEDURE — 86803 HEPATITIS C AB TEST: CPT | Mod: HCNC | Performed by: PHYSICIAN ASSISTANT

## 2024-10-16 PROCEDURE — 63600175 PHARM REV CODE 636 W HCPCS: Mod: HCNC

## 2024-10-16 PROCEDURE — 93010 ELECTROCARDIOGRAM REPORT: CPT | Mod: HCNC,,, | Performed by: INTERNAL MEDICINE

## 2024-10-16 PROCEDURE — 80053 COMPREHEN METABOLIC PANEL: CPT | Mod: HCNC | Performed by: EMERGENCY MEDICINE

## 2024-10-16 PROCEDURE — 85730 THROMBOPLASTIN TIME PARTIAL: CPT | Mod: HCNC | Performed by: EMERGENCY MEDICINE

## 2024-10-16 PROCEDURE — 87186 SC STD MICRODIL/AGAR DIL: CPT | Mod: 59,HCNC | Performed by: EMERGENCY MEDICINE

## 2024-10-16 PROCEDURE — 82803 BLOOD GASES ANY COMBINATION: CPT | Mod: HCNC

## 2024-10-16 PROCEDURE — 87040 BLOOD CULTURE FOR BACTERIA: CPT | Mod: HCNC | Performed by: EMERGENCY MEDICINE

## 2024-10-16 PROCEDURE — 83735 ASSAY OF MAGNESIUM: CPT | Mod: HCNC | Performed by: NURSE PRACTITIONER

## 2024-10-16 PROCEDURE — 80047 BASIC METABLC PNL IONIZED CA: CPT | Mod: HCNC

## 2024-10-16 PROCEDURE — 85025 COMPLETE CBC W/AUTO DIFF WBC: CPT | Mod: 91,HCNC | Performed by: STUDENT IN AN ORGANIZED HEALTH CARE EDUCATION/TRAINING PROGRAM

## 2024-10-16 PROCEDURE — 80048 BASIC METABOLIC PNL TOTAL CA: CPT | Mod: HCNC,XB | Performed by: NURSE PRACTITIONER

## 2024-10-16 PROCEDURE — 93005 ELECTROCARDIOGRAM TRACING: CPT | Mod: HCNC

## 2024-10-16 PROCEDURE — 94761 N-INVAS EAR/PLS OXIMETRY MLT: CPT | Mod: HCNC,XB

## 2024-10-16 PROCEDURE — 25000003 PHARM REV CODE 250: Mod: HCNC | Performed by: NURSE PRACTITIONER

## 2024-10-16 PROCEDURE — 25000003 PHARM REV CODE 250: Mod: HCNC

## 2024-10-16 PROCEDURE — 96374 THER/PROPH/DIAG INJ IV PUSH: CPT | Mod: HCNC

## 2024-10-16 PROCEDURE — 96361 HYDRATE IV INFUSION ADD-ON: CPT | Mod: HCNC

## 2024-10-16 PROCEDURE — 87154 CUL TYP ID BLD PTHGN 6+ TRGT: CPT | Mod: HCNC | Performed by: EMERGENCY MEDICINE

## 2024-10-16 PROCEDURE — 99291 CRITICAL CARE FIRST HOUR: CPT | Mod: HCNC,GC,, | Performed by: INTERNAL MEDICINE

## 2024-10-16 PROCEDURE — 83036 HEMOGLOBIN GLYCOSYLATED A1C: CPT | Mod: HCNC | Performed by: STUDENT IN AN ORGANIZED HEALTH CARE EDUCATION/TRAINING PROGRAM

## 2024-10-16 PROCEDURE — 81001 URINALYSIS AUTO W/SCOPE: CPT | Mod: HCNC | Performed by: EMERGENCY MEDICINE

## 2024-10-16 PROCEDURE — 99291 CRITICAL CARE FIRST HOUR: CPT | Mod: HCNC

## 2024-10-16 PROCEDURE — 87150 DNA/RNA AMPLIFIED PROBE: CPT | Mod: HCNC | Performed by: EMERGENCY MEDICINE

## 2024-10-16 PROCEDURE — 99900035 HC TECH TIME PER 15 MIN (STAT): Mod: HCNC

## 2024-10-16 PROCEDURE — 85025 COMPLETE CBC W/AUTO DIFF WBC: CPT | Mod: 91,HCNC | Performed by: EMERGENCY MEDICINE

## 2024-10-16 PROCEDURE — 83605 ASSAY OF LACTIC ACID: CPT | Mod: HCNC

## 2024-10-16 PROCEDURE — 99222 1ST HOSP IP/OBS MODERATE 55: CPT | Mod: HCNC,GC,, | Performed by: INTERNAL MEDICINE

## 2024-10-16 PROCEDURE — 25000003 PHARM REV CODE 250: Mod: HCNC | Performed by: EMERGENCY MEDICINE

## 2024-10-16 PROCEDURE — 82010 KETONE BODYS QUAN: CPT | Mod: HCNC | Performed by: EMERGENCY MEDICINE

## 2024-10-16 PROCEDURE — 86920 COMPATIBILITY TEST SPIN: CPT | Mod: HCNC | Performed by: STUDENT IN AN ORGANIZED HEALTH CARE EDUCATION/TRAINING PROGRAM

## 2024-10-16 PROCEDURE — 25000003 PHARM REV CODE 250: Mod: HCNC | Performed by: STUDENT IN AN ORGANIZED HEALTH CARE EDUCATION/TRAINING PROGRAM

## 2024-10-16 PROCEDURE — 85014 HEMATOCRIT: CPT | Mod: HCNC

## 2024-10-16 PROCEDURE — 82800 BLOOD PH: CPT | Mod: HCNC

## 2024-10-16 PROCEDURE — 84100 ASSAY OF PHOSPHORUS: CPT | Mod: HCNC | Performed by: NURSE PRACTITIONER

## 2024-10-16 PROCEDURE — S5010 5% DEXTROSE AND 0.45% SALINE: HCPCS | Mod: HCNC | Performed by: STUDENT IN AN ORGANIZED HEALTH CARE EDUCATION/TRAINING PROGRAM

## 2024-10-16 PROCEDURE — 82330 ASSAY OF CALCIUM: CPT | Mod: 91,HCNC | Performed by: STUDENT IN AN ORGANIZED HEALTH CARE EDUCATION/TRAINING PROGRAM

## 2024-10-16 PROCEDURE — 83735 ASSAY OF MAGNESIUM: CPT | Mod: 91,HCNC | Performed by: INTERNAL MEDICINE

## 2024-10-16 PROCEDURE — 63600175 PHARM REV CODE 636 W HCPCS: Mod: HCNC | Performed by: INTERNAL MEDICINE

## 2024-10-16 PROCEDURE — 84132 ASSAY OF SERUM POTASSIUM: CPT | Mod: HCNC

## 2024-10-16 PROCEDURE — 80048 BASIC METABOLIC PNL TOTAL CA: CPT | Mod: 91,HCNC,XB | Performed by: INTERNAL MEDICINE

## 2024-10-16 PROCEDURE — 85610 PROTHROMBIN TIME: CPT | Mod: HCNC | Performed by: EMERGENCY MEDICINE

## 2024-10-16 PROCEDURE — 87077 CULTURE AEROBIC IDENTIFY: CPT | Mod: 59,HCNC | Performed by: EMERGENCY MEDICINE

## 2024-10-16 PROCEDURE — 83690 ASSAY OF LIPASE: CPT | Mod: HCNC | Performed by: EMERGENCY MEDICINE

## 2024-10-16 PROCEDURE — 86901 BLOOD TYPING SEROLOGIC RH(D): CPT | Mod: HCNC | Performed by: EMERGENCY MEDICINE

## 2024-10-16 PROCEDURE — 63600175 PHARM REV CODE 636 W HCPCS: Mod: HCNC | Performed by: EMERGENCY MEDICINE

## 2024-10-16 PROCEDURE — 84295 ASSAY OF SERUM SODIUM: CPT | Mod: HCNC

## 2024-10-16 PROCEDURE — 96375 TX/PRO/DX INJ NEW DRUG ADDON: CPT | Mod: HCNC

## 2024-10-16 PROCEDURE — 20000000 HC ICU ROOM: Mod: HCNC

## 2024-10-16 RX ORDER — PANTOPRAZOLE SODIUM 40 MG/10ML
80 INJECTION, POWDER, LYOPHILIZED, FOR SOLUTION INTRAVENOUS
Status: COMPLETED | OUTPATIENT
Start: 2024-10-16 | End: 2024-10-16

## 2024-10-16 RX ORDER — ONDANSETRON HYDROCHLORIDE 2 MG/ML
4 INJECTION, SOLUTION INTRAVENOUS EVERY 6 HOURS PRN
Status: DISCONTINUED | OUTPATIENT
Start: 2024-10-16 | End: 2024-10-22 | Stop reason: HOSPADM

## 2024-10-16 RX ORDER — ONDANSETRON HYDROCHLORIDE 2 MG/ML
8 INJECTION, SOLUTION INTRAVENOUS
Status: DISPENSED | OUTPATIENT
Start: 2024-10-16 | End: 2024-10-16

## 2024-10-16 RX ORDER — POTASSIUM CHLORIDE 7.45 MG/ML
10 INJECTION INTRAVENOUS
Status: DISCONTINUED | OUTPATIENT
Start: 2024-10-16 | End: 2024-10-16

## 2024-10-16 RX ORDER — POTASSIUM CHLORIDE 7.45 MG/ML
20 INJECTION INTRAVENOUS ONCE
Status: DISCONTINUED | OUTPATIENT
Start: 2024-10-16 | End: 2024-10-16

## 2024-10-16 RX ORDER — OCTREOTIDE ACETATE 100 UG/ML
50 INJECTION, SOLUTION INTRAVENOUS; SUBCUTANEOUS
Status: COMPLETED | OUTPATIENT
Start: 2024-10-16 | End: 2024-10-16

## 2024-10-16 RX ORDER — POTASSIUM CHLORIDE 7.45 MG/ML
20 INJECTION INTRAVENOUS ONCE
Status: COMPLETED | OUTPATIENT
Start: 2024-10-16 | End: 2024-10-16

## 2024-10-16 RX ORDER — DEXTROSE MONOHYDRATE AND SODIUM CHLORIDE 5; .45 G/100ML; G/100ML
INJECTION, SOLUTION INTRAVENOUS CONTINUOUS PRN
Status: DISCONTINUED | OUTPATIENT
Start: 2024-10-16 | End: 2024-10-17

## 2024-10-16 RX ORDER — DEXTROSE MONOHYDRATE, SODIUM CHLORIDE, AND POTASSIUM CHLORIDE 50; 2.98; 4.5 G/1000ML; G/1000ML; G/1000ML
INJECTION, SOLUTION INTRAVENOUS CONTINUOUS
Status: DISCONTINUED | OUTPATIENT
Start: 2024-10-16 | End: 2024-10-17

## 2024-10-16 RX ORDER — DEXTROSE MONOHYDRATE AND SODIUM CHLORIDE 5; .45 G/100ML; G/100ML
INJECTION, SOLUTION INTRAVENOUS CONTINUOUS PRN
Status: DISCONTINUED | OUTPATIENT
Start: 2024-10-16 | End: 2024-10-16

## 2024-10-16 RX ORDER — SODIUM CHLORIDE AND POTASSIUM CHLORIDE 150; 900 MG/100ML; MG/100ML
INJECTION, SOLUTION INTRAVENOUS CONTINUOUS
Status: DISCONTINUED | OUTPATIENT
Start: 2024-10-16 | End: 2024-10-17

## 2024-10-16 RX ORDER — SODIUM CHLORIDE 0.9 % (FLUSH) 0.9 %
10 SYRINGE (ML) INJECTION
Status: DISCONTINUED | OUTPATIENT
Start: 2024-10-16 | End: 2024-10-22 | Stop reason: HOSPADM

## 2024-10-16 RX ORDER — CEFTRIAXONE 1 G/1
1 INJECTION, POWDER, FOR SOLUTION INTRAMUSCULAR; INTRAVENOUS
Status: COMPLETED | OUTPATIENT
Start: 2024-10-16 | End: 2024-10-16

## 2024-10-16 RX ORDER — SODIUM CHLORIDE, SODIUM LACTATE, POTASSIUM CHLORIDE, CALCIUM CHLORIDE 600; 310; 30; 20 MG/100ML; MG/100ML; MG/100ML; MG/100ML
INJECTION, SOLUTION INTRAVENOUS CONTINUOUS
Status: DISCONTINUED | OUTPATIENT
Start: 2024-10-16 | End: 2024-10-16

## 2024-10-16 RX ORDER — PANTOPRAZOLE SODIUM 40 MG/10ML
40 INJECTION, POWDER, LYOPHILIZED, FOR SOLUTION INTRAVENOUS 2 TIMES DAILY
Status: DISCONTINUED | OUTPATIENT
Start: 2024-10-16 | End: 2024-10-19

## 2024-10-16 RX ORDER — SODIUM CHLORIDE 0.9 % (FLUSH) 0.9 %
10 SYRINGE (ML) INJECTION
Status: DISCONTINUED | OUTPATIENT
Start: 2024-10-16 | End: 2024-10-16

## 2024-10-16 RX ORDER — ONDANSETRON HYDROCHLORIDE 2 MG/ML
8 INJECTION, SOLUTION INTRAVENOUS
Status: COMPLETED | OUTPATIENT
Start: 2024-10-16 | End: 2024-10-16

## 2024-10-16 RX ORDER — ONDANSETRON HYDROCHLORIDE 2 MG/ML
INJECTION, SOLUTION INTRAVENOUS
Status: COMPLETED
Start: 2024-10-16 | End: 2024-10-16

## 2024-10-16 RX ORDER — HYDROCODONE BITARTRATE AND ACETAMINOPHEN 500; 5 MG/1; MG/1
TABLET ORAL
Status: DISCONTINUED | OUTPATIENT
Start: 2024-10-16 | End: 2024-10-22 | Stop reason: HOSPADM

## 2024-10-16 RX ADMIN — VANCOMYCIN HYDROCHLORIDE 2000 MG: 500 INJECTION, POWDER, LYOPHILIZED, FOR SOLUTION INTRAVENOUS at 01:10

## 2024-10-16 RX ADMIN — ONDANSETRON 8 MG: 2 INJECTION INTRAMUSCULAR; INTRAVENOUS at 11:10

## 2024-10-16 RX ADMIN — ONDANSETRON HYDROCHLORIDE 8 MG: 2 INJECTION, SOLUTION INTRAVENOUS at 11:10

## 2024-10-16 RX ADMIN — CEFTRIAXONE 1 G: 1 INJECTION, POWDER, FOR SOLUTION INTRAMUSCULAR; INTRAVENOUS at 11:10

## 2024-10-16 RX ADMIN — SODIUM CHLORIDE, SODIUM LACTATE, POTASSIUM CHLORIDE, AND CALCIUM CHLORIDE: .6; .31; .03; .02 INJECTION, SOLUTION INTRAVENOUS at 01:10

## 2024-10-16 RX ADMIN — DEXTROSE MONOHYDRATE, SODIUM CHLORIDE, AND POTASSIUM CHLORIDE: 50; 4.5; 2.98 INJECTION, SOLUTION INTRAVENOUS at 11:10

## 2024-10-16 RX ADMIN — OCTREOTIDE ACETATE 50 MCG/HR: 500 INJECTION, SOLUTION INTRAVENOUS; SUBCUTANEOUS at 09:10

## 2024-10-16 RX ADMIN — INSULIN HUMAN 0.1 UNITS/KG/HR: 1 INJECTION, SOLUTION INTRAVENOUS at 06:10

## 2024-10-16 RX ADMIN — POTASSIUM CHLORIDE 10 MEQ: 7.46 INJECTION, SOLUTION INTRAVENOUS at 06:10

## 2024-10-16 RX ADMIN — SODIUM PHOSPHATE, MONOBASIC, MONOHYDRATE AND SODIUM PHOSPHATE, DIBASIC, ANHYDROUS 30 MMOL: 142; 276 INJECTION, SOLUTION INTRAVENOUS at 11:10

## 2024-10-16 RX ADMIN — SODIUM CHLORIDE, POTASSIUM CHLORIDE, SODIUM LACTATE AND CALCIUM CHLORIDE 1000 ML: 600; 310; 30; 20 INJECTION, SOLUTION INTRAVENOUS at 04:10

## 2024-10-16 RX ADMIN — INSULIN HUMAN 0.1 UNITS/KG/HR: 1 INJECTION, SOLUTION INTRAVENOUS at 12:10

## 2024-10-16 RX ADMIN — SODIUM CHLORIDE, POTASSIUM CHLORIDE, SODIUM LACTATE AND CALCIUM CHLORIDE 1000 ML: 600; 310; 30; 20 INJECTION, SOLUTION INTRAVENOUS at 11:10

## 2024-10-16 RX ADMIN — DEXTROSE AND SODIUM CHLORIDE: 5; 450 INJECTION, SOLUTION INTRAVENOUS at 09:10

## 2024-10-16 RX ADMIN — PIPERACILLIN SODIUM AND TAZOBACTAM SODIUM 4.5 G: 4; .5 INJECTION, POWDER, FOR SOLUTION INTRAVENOUS at 05:10

## 2024-10-16 RX ADMIN — ONDANSETRON 4 MG: 2 INJECTION INTRAMUSCULAR; INTRAVENOUS at 09:10

## 2024-10-16 RX ADMIN — OCTREOTIDE ACETATE 50 MCG/HR: 500 INJECTION, SOLUTION INTRAVENOUS; SUBCUTANEOUS at 12:10

## 2024-10-16 RX ADMIN — PANTOPRAZOLE SODIUM 80 MG: 40 INJECTION, POWDER, LYOPHILIZED, FOR SOLUTION INTRAVENOUS at 11:10

## 2024-10-16 RX ADMIN — OCTREOTIDE ACETATE 50 MCG: 100 INJECTION, SOLUTION INTRAVENOUS; SUBCUTANEOUS at 11:10

## 2024-10-16 RX ADMIN — PIPERACILLIN SODIUM AND TAZOBACTAM SODIUM 4.5 G: 4; .5 INJECTION, POWDER, FOR SOLUTION INTRAVENOUS at 12:10

## 2024-10-16 RX ADMIN — PANTOPRAZOLE SODIUM 40 MG: 40 INJECTION, POWDER, LYOPHILIZED, FOR SOLUTION INTRAVENOUS at 12:10

## 2024-10-16 RX ADMIN — PANTOPRAZOLE SODIUM 40 MG: 40 INJECTION, POWDER, LYOPHILIZED, FOR SOLUTION INTRAVENOUS at 08:10

## 2024-10-16 RX ADMIN — POTASSIUM CHLORIDE 20 MEQ: 7.46 INJECTION, SOLUTION INTRAVENOUS at 07:10

## 2024-10-16 RX ADMIN — POTASSIUM CHLORIDE 10 MEQ: 7.46 INJECTION, SOLUTION INTRAVENOUS at 05:10

## 2024-10-16 RX ADMIN — POTASSIUM CHLORIDE 10 MEQ: 7.46 INJECTION, SOLUTION INTRAVENOUS at 04:10

## 2024-10-16 NOTE — ASSESSMENT & PLAN NOTE
54 year old male with pmh of uncontrolled DM, GERD, and colon cancer s/p right hemicolectomy for whom GI was consulted for hematemesis. He reports that this began Tuesday and has never occurred prior. He denies abdominal pain, chest pain, and melena/hematochezia. He also denies NSAID and alcohol use. He was hypothermic and tachycardic but overall HDS. He is also currently on an insulin gtt for DKA. Hgb on admission was 13.1 on 2 occasions a couple hours apart. There have not been any episodes of hematemesis since arrival to [facility] [place]. He has followed up appropriately with CRC and is up to date on colonoscopy;last was in 2023 and a single polyp was found-repeat in 2 years recommended. No EGDs are on file. He saw GI in 2022 for GERD and as prescribed a PPI at that time.     - No plans for endoscopy at this time given no witnessed hematemesis and stable Hgb  - Please re consult if Hgb drops or overt signs of GI bleeding  - Trend Hgb q12hrs. Transfuse for Hgb > 7, unless otherwise indicated  - Please notify GI team if there is significant change in patient's clinical status

## 2024-10-16 NOTE — H&P
[facility] - Medical ICU  Critical Care Medicine  History & Physical    Patient Name: Indio Ryder Jr.  MRN: [id number]  Admission Date: 10/16/2024  Hospital Length of Stay: 0 days  Code Status: Full Code  Attending Physician: Henry Snyder*   Primary Care Provider: Lorena Thakur MD   Principal Problem: <principal problem not specified>    Subjective:     HPI:  Indio Ryder Jr. is a 54-year-old male with a past medical history of diabetes with recurrent DKA, colon adenocarcinoma, hyperlipidemia, hypertension, shoulder impingement, osteomyelitis of right foot who presented with hematemesis associated with nausea and vomiting.  Per EMS, they report patient was lethargic, hypotensive and tachycardic, found at home with a bucket of blood next to him.  He reports emesis for the last 2 days associated with hematemesis.  Per EMS, blood sugar 330s.  Patient states that he has been feeling sick with nausea vomiting but no abdominal pain for the last 2 days.  He has a family member present at bedside.  She reports that she has not visited him and heard from him for the last 2 days, states he was his normal self on Monday morning. She believes he had some dental work with tooth extraction and a root canal done on Monday after she spoke with him.  He denies any fevers or chills, falls or trauma.  He denies any hematochezia or melena. At baseline he is independently capable of performing ADL's and drives. Currently lives alone.    In the ED, , , CO2 8, Glucose 375, BUN 39, Creatinine 3.1, Calcium 11.2, Alk Phos 166 AST 8, ALT 8, Anion gap 27, VBG 7.171/27/38/9.9/-19, hemoglobin 13.1, Beta hydroxybutyrate 4.3. He was treated with vanc/zosyn, PPI, octreotide, and insulin gtt has been started. Patient to be admitted to MICU for further management of GI bleed and DKA with resulting severe acidosis.     Hospital/ICU Course:  No notes on file     Past Medical History:   Diagnosis Date    Actinomyces  infection 01/17/2017    Right foot    Colon adenocarcinoma 04/12/2022    Diabetic ketoacidosis without coma associated with type 2 diabetes mellitus 05/30/2017    Diabetic ulcer of right foot associated with type 2 diabetes mellitus 06/03/2015    Disorder of kidney and ureter     Essential hypertension 06/06/2013    Group B streptococcal infection 01/13/2017    Mixed hyperlipidemia 08/12/2014    Septic arthritis of left foot 04/28/2021    Shoulder impingement 08/12/2014    Subacute osteomyelitis of right foot 01/12/2017    Streptococcus agalactiae, Actinomyces odontolyticus    Traumatic amputation of fifth toe of right foot 07/02/2015    Type 2 diabetes mellitus with diabetic neuropathy, with long-term current use of insulin 05/03/2016       Past Surgical History:   Procedure Laterality Date    COLONOSCOPY Right 1/19/2022    Procedure: COLONOSCOPY;  Surgeon: Tj Haile MD;  Location: [facility] (4TH FLR);  Service: Endoscopy;  Laterality: Right;  previous order un-usable/poor prep 1/18/22  RAPID COVID test arrival 12:20  instructions handed to pt-     COLONOSCOPY N/A 3/21/2022    Procedure: COLONOSCOPY;  Surgeon: Sheng Haque MD;  Location: [facility] (2ND FLR);  Service: Endoscopy;  Laterality: N/A;  Colonoscopy with AES for hepatix flexure polyp removal, 2nd floor  Pt is fully vaccinated-DS  Pt prescribed Plavix by Dr. Stahl in Jan. 2022, however pt states that he never started taking it-DS  3/16/22-Instructions sent via portal-DS    COLONOSCOPY N/A 9/13/2023    Procedure: COLONOSCOPY;  Surgeon: Erik Stout MD;  Location: [facility] (4TH FLR);  Service: Colon and Rectal;  Laterality: N/A;  Referred by timur Saunders, [facility], portal -ml    DEBRIDEMENT OF FOOT Left 7/14/2019    Procedure: DEBRIDEMENT, FOOT, with left 5th ray amputation;  Surgeon: Sis Hickman DPM;  Location: [facility] OR 2ND FLR;  Service: Podiatry;  Laterality: Left;    DEBRIDEMENT OF FOOT Left 7/17/2019    Procedure:  DEBRIDEMENT, FOOT with leftt 5th ray partial amputation with Neox Graft;  Surgeon: Mai Burrell DPM;  Location: [facility] OR 2ND FLR;  Service: Podiatry;  Laterality: Left;  t    DEBRIDEMENT OF FOOT Bilateral 4/29/2021    Procedure: DEBRIDEMENT, FOOT;  Surgeon: Mai Burrell DPM;  Location: [facility] OR 1ST FLR;  Service: Podiatry;  Laterality: Bilateral;  Graft application    DEBRIDEMENT OF FOOT Left 7/31/2023    Procedure: DEBRIDEMENT, FOOT;  Surgeon: Marivel Peterson DPM;  Location: [facility] OR 2ND FLR;  Service: Podiatry;  Laterality: Left;    TOE AMPUTATION Right 06/05/2015    TOE AMPUTATION Right 01/19/2017    XI ROBOTIC COLECTOMY, RIGHT N/A 4/25/2022    Procedure: XI ROBOTIC COLECTOMY, RIGHT (lithotomy, eras low);  Surgeon: Erik Stout MD;  Location: [facility] OR 2ND FLR;  Service: Colon and Rectal;  Laterality: N/A;  Paruch to Goodwin    XI ROBOTIC COLECTOMY, RIGHT  4/25/2022    Procedure: ;  Surgeon: Erik Stout MD;  Location: [facility] OR 2ND FLR;  Service: Colon and Rectal;;       Review of patient's allergies indicates:  No Known Allergies    Family History       Problem Relation (Age of Onset)    Cancer Mother    Cataracts Father    Diabetes Mother, Sister    Heart disease Father, Sister    Hypertension Mother, Father, Maternal Aunt    No Known Problems Brother, Sister, Maternal Uncle, Paternal Aunt, Paternal Uncle, Maternal Grandmother, Maternal Grandfather, Paternal Grandmother, Paternal Grandfather          Tobacco Use    Smoking status: Never    Smokeless tobacco: Never   Substance and Sexual Activity    Alcohol use: No    Drug use: No    Sexual activity: Yes     Partners: Female     Birth control/protection: Condom      Review of Systems   Reason unable to perform ROS: Pt largely unresponsive due to weakness/lethargy.   HENT:  Positive for dental problem.    Respiratory:  Negative for chest tightness and shortness of breath.    Cardiovascular:  Negative for chest pain.    Gastrointestinal:  Positive for nausea and vomiting.   Skin:  Positive for wound.     Objective:     Vital Signs (Most Recent):  Temp: (!) 93.9 °F (34.4 °C) (10/16/24 1515)  Pulse: 103 (10/16/24 1515)  Resp: 16 (10/16/24 1515)  BP: 117/67 (10/16/24 1515)  SpO2: 100 % (10/16/24 1515) Vital Signs (24h Range):  Temp:  [93 °F (33.9 °C)-94.1 °F (34.5 °C)] 93.9 °F (34.4 °C)  Pulse:  [101-111] 103  Resp:  [15-19] 16  SpO2:  [97 %-100 %] 100 %  BP: (102-158)/(56-98) 117/67   Weight: 97.5 kg (214 lb 15.2 oz)  Body mass index is 28.36 kg/m².      Intake/Output Summary (Last 24 hours) at 10/16/2024 1547  Last data filed at 10/16/2024 1506  Gross per 24 hour   Intake 1826.29 ml   Output 1700 ml   Net 126.29 ml          Physical Exam  Vitals and nursing note reviewed.   Constitutional:       Appearance: He is ill-appearing and toxic-appearing.      Comments: Lethargic, minimally responsive to questioning   HENT:      Head: Normocephalic.   Eyes:      General: No scleral icterus.  Cardiovascular:      Rate and Rhythm: Normal rate and regular rhythm.      Heart sounds: Normal heart sounds. No murmur heard.  Pulmonary:      Effort: Pulmonary effort is normal. No respiratory distress.      Breath sounds: Normal breath sounds. No wheezing or rales.   Abdominal:      General: Abdomen is flat.      Palpations: Abdomen is soft.   Musculoskeletal:         General: Normal range of motion.      Cervical back: Normal range of motion.      Right lower leg: No edema.      Left lower leg: No edema.   Skin:     General: Skin is warm and dry.      Comments: Left foot wrapped in clean bandage   Neurological:      Motor: Weakness (diffuse) present.            Vents:     Lines/Drains/Airways       Drain  Duration                  Urethral Catheter 10/16/24 1312 Temperature probe 16 Fr. <1 day              Peripheral Intravenous Line  Duration                  Peripheral IV - Single Lumen 10/16/24 1123 18 G Left Antecubital <1 day          Peripheral IV - Single Lumen 10/16/24 1128 18 G Right Antecubital <1 day         Peripheral IV - Single Lumen 10/16/24 1249 20 G Anterior;Distal;Right Forearm <1 day                  Significant Labs:    CBC/Anemia Profile:  Recent Labs   Lab 10/16/24  1114 10/16/24  1122 10/16/24  1251   WBC 10.30  --  8.21   HGB 13.1*  --  13.1*   HCT 43.9 42 42.5   *  --  456*   MCV 96  --  94   RDW 14.7*  --  16.0*        Chemistries:  Recent Labs   Lab 10/16/24  1114   *   K 4.4   *   CO2 8*   BUN 39*   CREATININE 3.1*   CALCIUM 11.2*   ALBUMIN 2.6*   PROT 9.4*   BILITOT 0.2   ALKPHOS 166*   ALT 8*   AST 8*       All pertinent labs within the past 24 hours have been reviewed.    Significant Imaging: I have reviewed all pertinent imaging results/findings within the past 24 hours.  Assessment/Plan:     Cardiac/Vascular  Mixed hyperlipidemia  Hold home statin in setting of critical illness; restart as indicated    Renal/  TAHIR (acute kidney injury)  Creatinine 3.1 on admit, baseline around 1.0  - Likely pre-renal from dehydration and volume losses/DKA/GI bleed    Plan:   Lab Results   Component Value Date    CREATININE 3.1 (H) 10/16/2024     - Check urine lytes and renal ultrasound if no improvement with adequate resuscitation   - Strict I&Os and daily weights   - Gentle IVF  - Avoid nephrotoxic agents such as NSAIDs, gadolinium and IV radiocontrast.  - Renally dose meds to current GFR.  - Maintain MAP > 65.      ID  Chronic osteomyelitis of left foot  Culture from 10/13/2024 with pseudomonas. Podiatry started him on Ciprofloxacin as an outpatient.     --Continue Vanc, Zosyn    Oncology  History of colon cancer  Hx of colon cancer diagnosed in 03/2022 s/p right hemicolectomy    Endocrine  DKA (diabetic ketoacidosis)  Blood sugar on arrival 375 with a bicarb 8, anion gap 27, BHB 4.3 and pH 7.17    Plan:  - Start insulin gtt per protocol with q1h accuchecks while on the drip.    - Once Bicarb >18, Anion gap <10,  Glucose <200 on 2 readings and able to tolerate PO intake without nausea and vomiting, transition to subq insulin with a 1-2 h overlap with drip.    - Start LR at 125cc/hr.  Once blood sugar is 200, change IVF to D5 1/2 NS at 50cc/h  --BMP, Mg, Phos q4hrs  - F/u a1c      Type 2 diabetes mellitus with hyperglycemia, with long-term current use of insulin  Home regimen: Jardiance  --See DKA    GI  Acute GI bleeding  Pt with reported buckets of blood and small volume hematemesis. Hx of colon cancer.   Hgb stable on arrival at 13.1.     - Consult GI, appreciate recs  - PPI 80mg x 1 followed by 40mg BID  - CBC q4 hrs  - Type and screen  - Transfuse PRBC for hemoglobin <7        Critical Care Daily Checklist:    A: Awake: RASS Goal/Actual Goal:    Actual:     B: Spontaneous Breathing Trial Performed?     C: SAT & SBT Coordinated?  N/a                      D: Delirium: CAM-ICU Overall CAM-ICU: Negative   E: Early Mobility Performed? Yes   F: Feeding Goal:    Status:     Current Diet Order   Procedures    Diet NPO      AS: Analgesia/Sedation none   T: Thromboembolic Prophylaxis hold   H: HOB > 300 Yes   U: Stress Ulcer Prophylaxis (if needed) PPI   G: Glucose Control Insulin gtt   B: Bowel Function     I: Indwelling Catheter (Lines & Uriarte) Necessity PIV x 3, uriarte   D: De-escalation of Antimicrobials/Pharmacotherapies Vanc/zosyn    Plan for the day/ETD Admit to MICU; GI consult    Code Status:  Family/Goals of Care: Full Code         Critical secondary to Patient has a condition that poses threat to life and bodily function: DKA with severe acidosis    Critical care was time spent personally by me on the following activities: development of treatment plan with patient or surrogate and bedside caregivers, discussions with consultants, evaluation of patient's response to treatment, examination of patient, ordering and performing treatments and interventions, ordering and review of laboratory studies, ordering and review of  radiographic studies, pulse oximetry, re-evaluation of patient's condition. This critical care time did not overlap with that of any other provider or involve time for any procedures.     Toshia Maldonado,   Critical Care Medicine  [facility] ICU

## 2024-10-16 NOTE — HPI
54 year old male with PMH of uncontrolled DM, colon cancer, and GERD for whom GI was consulted for hematemesis. Patient is very lethargic and unable to offer any signifcant history. Per chart review, family had not heard from for several days so they checked on him at home. He was found slumped over and subsequently transported via [facility]. He was able to report that hematemesis began on Tuesday. He has never had this problem in the past. He denies abdominal pain, chest pain, and melena/hematochezia. He also denies NSAID and alcohol use.     Of note, he underwent right hemicolectomy for colon adenocarcinoma on 4/25/2022. His most recent colonoscopy was in 2023 where he had 1 polyp removed; next colonoscopy advised for 2 years afterwards. There are no records of any EGDs. He also GI in 2022 for GERD and was prescribed a daily PPI.    On arrival, he was hypothermic and tachycardic, but overall HDS. He is on an insulin drip for DKA. His Hgb was 13.1 and on 2 hours later 13.1. There have been no reports of neha hematemesis since arrival [facility] [place] [facility].

## 2024-10-16 NOTE — ASSESSMENT & PLAN NOTE
Culture from 10/13/2024 with pseudomonas. Podiatry started him on Ciprofloxacin as an outpatient.     --Continue Vanc, Zosyn

## 2024-10-16 NOTE — CONSULTS
[facility]  Gastroenterology  Consult Note    Patient Name: Indio Ryder Jr.  MRN: [id number]  Admission Date: 10/16/2024  Hospital Length of Stay: 0 days  Code Status: Full Code   Attending Provider: Henry Snyder*   Consulting Provider: Noel Saleh DO  Primary Care Physician: Lorena Thakur MD  Principal Problem:DKA (diabetic ketoacidosis)    Inpatient consult to Gastroenterology  Consult performed by: Noel Saleh DO  Consult ordered by: Willam Metzger DO        Subjective:     HPI:  54 year old male with PMH of uncontrolled DM, colon cancer, and GERD for whom GI was consulted for hematemesis. Patient is very lethargic and unable to offer any signifcant history. Per chart review, family had not heard from for several days so they checked on him at home. He was found slumped over and subsequently transported via EMS to [facility] [place] [facility]. He was able to report that hematemesis began on Tuesday. He has never had this problem in the past. He denies abdominal pain, chest pain, and melena/hematochezia. He also denies NSAID and alcohol use.     Of note, he underwent right hemicolectomy for colon adenocarcinoma on 4/25/2022. His most recent colonoscopy was in 2023 where he had 1 polyp removed; next colonoscopy advised for 2 years afterwards. There are no records of any EGDs. He also GI in 2022 for GERD and was prescribed a daily PPI.    On arrival, he was hypothermic and tachycardic, but overall HDS. He is on an insulin drip for DKA. His Hgb was 13.1 and on 2 hours later 13.1. There have been no reports of neha hematemesis since arrival to [facility] [place].    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    Right foot    Colon adenocarcinoma 04/12/2022    Diabetic ketoacidosis without coma associated with type 2 diabetes mellitus 05/30/2017    Diabetic ulcer of right foot associated with type 2 diabetes mellitus 06/03/2015    Disorder of kidney and ureter     Essential hypertension  06/06/2013    Group B streptococcal infection 01/13/2017    Mixed hyperlipidemia 08/12/2014    Septic arthritis of left foot 04/28/2021    Shoulder impingement 08/12/2014    Subacute osteomyelitis of right foot 01/12/2017    Streptococcus agalactiae, Actinomyces odontolyticus    Traumatic amputation of fifth toe of right foot 07/02/2015    Type 2 diabetes mellitus with diabetic neuropathy, with long-term current use of insulin 05/03/2016       Past Surgical History:   Procedure Laterality Date    COLONOSCOPY Right 1/19/2022    Procedure: COLONOSCOPY;  Surgeon: Tj Haile MD;  Location: [facility] ENDO (4TH FLR);  Service: Endoscopy;  Laterality: Right;  previous order un-usable/poor prep 1/18/22  RAPID COVID test arrival 12:20  instructions handed to pt-     COLONOSCOPY N/A 3/21/2022    Procedure: COLONOSCOPY;  Surgeon: Sheng Haque MD;  Location: [facility] ENDO (2ND FLR);  Service: Endoscopy;  Laterality: N/A;  Colonoscopy with AES for hepatix flexure polyp removal, 2nd floor  Pt is fully vaccinated-DS  Pt prescribed Plavix by Dr. Stahl in Jan. 2022, however pt states that he never started taking it-DS  3/16/22-Instructions sent via portal-DS    COLONOSCOPY N/A 9/13/2023    Procedure: COLONOSCOPY;  Surgeon: Erik Stout MD;  Location: [facility] (4TH FLR);  Service: Colon and Rectal;  Laterality: N/A;  Referred by timur Saunders, [facility], portal -ml    DEBRIDEMENT OF FOOT Left 7/14/2019    Procedure: DEBRIDEMENT, FOOT, with left 5th ray amputation;  Surgeon: Sis Hickman DPM;  [facility];  Service: Podiatry;  Laterality: Left;    DEBRIDEMENT OF FOOT Left 7/17/2019    Procedure: DEBRIDEMENT, FOOT with leftt 5th ray partial amputation with Neox Graft;  Surgeon: Mai Burrell DPM;  Location: [facility] OR [address] [place] [address];  Service: Podiatry;  Laterality: Left;  t    DEBRIDEMENT OF FOOT Bilateral 4/29/2021    Procedure: DEBRIDEMENT, FOOT;  Surgeon: Mai Burrell DPM;  Location: [facility] OR  1ST FLR;  Service: Podiatry;  Laterality: Bilateral;  Graft application    DEBRIDEMENT OF FOOT Left 7/31/2023    Procedure: DEBRIDEMENT, FOOT;  Surgeon: Marivel Peterson DPM;  Location: [facility] OR 2ND FLR;  Service: Podiatry;  Laterality: Left;    TOE AMPUTATION Right 06/05/2015    TOE AMPUTATION Right 01/19/2017    XI ROBOTIC COLECTOMY, RIGHT N/A 4/25/2022    Procedure: XI ROBOTIC COLECTOMY, RIGHT (lithotomy, eras low);  Surgeon: Erik Stout MD;  Location: [facility] OR 2ND FLR;  Service: Colon and Rectal;  Laterality: N/A;  Paruch to Goodwin    XI ROBOTIC COLECTOMY, RIGHT  4/25/2022    Procedure: ;  Surgeon: Erik Stout MD;  Location: [facility] OR 2ND FLR;  Service: Colon and Rectal;;       Review of patient's allergies indicates:  No Known Allergies  Family History       Problem Relation (Age of Onset)    Cancer Mother    Cataracts Father    Diabetes Mother, Sister    Heart disease Father, Sister    Hypertension Mother, Father, Maternal Aunt    No Known Problems Brother, Sister, Maternal Uncle, Paternal Aunt, Paternal Uncle, Maternal Grandmother, Maternal Grandfather, Paternal Grandmother, Paternal Grandfather          Tobacco Use    Smoking status: Never    Smokeless tobacco: Never   Substance and Sexual Activity    Alcohol use: No    Drug use: No    Sexual activity: Yes     Partners: Female     Birth control/protection: Condom     Review of Systems   Reason unable to perform ROS: limited 2/2 lethargy.   Gastrointestinal:  Positive for nausea and vomiting. Negative for abdominal pain, blood in stool, constipation, diarrhea and rectal pain.     Objective:     Vital Signs (Most Recent):  Temp: (!) 95.2 °F (35.1 °C) (10/16/24 1701)  Pulse: 102 (10/16/24 1701)  Resp: 12 (10/16/24 1701)  BP: (!) 161/82 (10/16/24 1701)  SpO2: 100 % (10/16/24 1701) Vital Signs (24h Range):  Temp:  [93 °F (33.9 °C)-95.2 °F (35.1 °C)] 95.2 °F (35.1 °C)  Pulse:  [101-111] 102  Resp:  [12-19] 12  SpO2:  [97 %-100 %] 100 %  BP:  (102-161)/(56-98) 161/82     Weight: 97.5 kg (214 lb 15.2 oz) (10/16/24 1533)  Body mass index is 28.36 kg/m².      Intake/Output Summary (Last 24 hours) at 10/16/2024 1732  Last data filed at 10/16/2024 1634  Gross per 24 hour   Intake 2559.47 ml   Output 1700 ml   Net 859.47 ml       Lines/Drains/Airways       Drain  Duration                  Urethral Catheter 10/16/24 1312 Temperature probe 16 Fr. <1 day              Peripheral Intravenous Line  Duration                  Peripheral IV - Single Lumen 10/16/24 1123 18 G Left Antecubital <1 day         Peripheral IV - Single Lumen 10/16/24 1128 18 G Right Antecubital <1 day         Peripheral IV - Single Lumen 10/16/24 1249 20 G Anterior;Distal;Right Forearm <1 day                     Physical Exam  Constitutional:       Appearance: He is ill-appearing.   HENT:      Head: Normocephalic and atraumatic.   Eyes:      General: No scleral icterus.  Cardiovascular:      Rate and Rhythm: Tachycardia present.      Pulses: Normal pulses.   Pulmonary:      Effort: Pulmonary effort is normal. No respiratory distress.   Abdominal:      General: Abdomen is flat. There is no distension.      Palpations: Abdomen is soft.      Tenderness: There is no abdominal tenderness. There is no guarding or rebound.   Musculoskeletal:         General: No deformity.   Skin:     General: Skin is warm and dry.      Comments: Foot wound   Neurological:      Mental Status: He is lethargic and disoriented.   Psychiatric:      Comments: Unable to assess          Significant Labs:  CBC:   Recent Labs   Lab 10/16/24  1114 10/16/24  1122 10/16/24  1251 10/16/24  1458   WBC 10.30  --  8.21 8.41   HGB 13.1*  --  13.1* 13.2*   HCT 43.9 42 42.5 42.5   *  --  456* 459*     CMP:   Recent Labs   Lab 10/16/24  1114 10/16/24  1458   * 256*   CALCIUM 11.2* 9.2   ALBUMIN 2.6*  --    PROT 9.4*  --    * 149*   K 4.4 2.9*   CO2 8* 10*   * 118*   BUN 39* 32*   CREATININE 3.1* 2.3*   ALKPHOS  166*  --    ALT 8*  --    AST 8*  --    BILITOT 0.2  --      All pertinent lab results from the last 24 hours have been reviewed.    Significant Imaging:  Imaging results within the past 24 hours have been reviewed.  Assessment/Plan:     GI  Hematemesis  54 year old male with pmh of uncontrolled DM, GERD, and colon cancer s/p right hemicolectomy for whom GI was consulted for hematemesis. He reports that this began Tuesday and has never occurred prior. He denies abdominal pain, chest pain, and melena/hematochezia. He also denies NSAID and alcohol use. He was hypothermic and tachycardic but overall HDS. He is also currently on an insulin gtt for DKA. Hgb on admission was 13.1 on 2 occasions a couple hours apart. There have not been any episodes of hematemesis since [place] [facility] He has followed up appropriately with [facility] and is up to date on colonoscopy;last was in 2023 and a single polyp was found-repeat in 2 years recommended. No EGDs are on file. He saw GI in 2022 for GERD and as prescribed a PPI at that time.     - No plans for endoscopy at this time given no witnessed hematemesis and stable Hgb  - Please re consult if Hgb drops or overt signs of GI bleeding  - Trend Hgb q12hrs. Transfuse for Hgb > 7, unless otherwise indicated  - Please notify GI team if there is significant change in patient's clinical status          Thank you for your consult. I will sign off. Please contact us if you have any additional questions.    Noel Saleh, DO  Gastroenterology  [facility]

## 2024-10-16 NOTE — ASSESSMENT & PLAN NOTE
Pt with reported buckets of blood and small volume hematemesis. Hx of colon cancer.   Hgb stable on arrival at 13.1.     - Consult GI, appreciate recs  - PPI 80mg x 1 followed by 40mg BID  - CBC q4 hrs  - Type and screen  - Transfuse PRBC for hemoglobin <7

## 2024-10-16 NOTE — ED TRIAGE NOTES
Pt. Is a 54 y.o. male presenting to the ED via EMS from home. Per the pt.'s family, he lives alone and no one had heard from him in 2 days. Pt. Was found by family slumped over on his bathroom floor. Pt. Has had hematemesis X2 days. Reports nausea, but no abdominal pain.

## 2024-10-16 NOTE — SUBJECTIVE & OBJECTIVE
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    Right foot    Colon adenocarcinoma 04/12/2022    Diabetic ketoacidosis without coma associated with type 2 diabetes mellitus 05/30/2017    Diabetic ulcer of right foot associated with type 2 diabetes mellitus 06/03/2015    Disorder of kidney and ureter     Essential hypertension 06/06/2013    Group B streptococcal infection 01/13/2017    Mixed hyperlipidemia 08/12/2014    Septic arthritis of left foot 04/28/2021    Shoulder impingement 08/12/2014    Subacute osteomyelitis of right foot 01/12/2017    Streptococcus agalactiae, Actinomyces odontolyticus    Traumatic amputation of fifth toe of right foot 07/02/2015    Type 2 diabetes mellitus with diabetic neuropathy, with long-term current use of insulin 05/03/2016       Past Surgical History:   Procedure Laterality Date    COLONOSCOPY Right 1/19/2022    Procedure: COLONOSCOPY;  Surgeon: Tj Haile MD;  Location: [facility] (4TH FLR);  Service: Endoscopy;  Laterality: Right;  previous order un-usable/poor prep 1/18/22  RAPID COVID test arrival 12:20  instructions handed to pt- sm    COLONOSCOPY N/A 3/21/2022    Procedure: COLONOSCOPY;  Surgeon: Sheng Haque MD;  Location: [facility] (2ND FLR);  Service: Endoscopy;  Laterality: N/A;  Colonoscopy with AES for hepatix flexure polyp removal, 2nd floor  Pt is fully vaccinated-DS  Pt prescribed Plavix by Dr. Stahl in Jan. 2022, however pt states that he never started taking it-DS  3/16/22-Instructions sent via portal-DS    COLONOSCOPY N/A 9/13/2023    Procedure: COLONOSCOPY;  Surgeon: Erik Stout MD;  Location: [facility] (4TH FLR);  Service: Colon and Rectal;  Laterality: N/A;  Referred by timur Saunders, Med[facility], portal -ml    DEBRIDEMENT OF FOOT Left 7/14/2019    Procedure: DEBRIDEMENT, FOOT, with left 5th ray amputation;  Surgeon: Sis Hickman DPM;  Location: [facility] OR 2ND FLR;  Service: Podiatry;  Laterality: Left;     DEBRIDEMENT OF FOOT Left 7/17/2019    Procedure: DEBRIDEMENT, FOOT with leftt 5th ray partial amputation with Neox Graft;  Surgeon: Mai Burrell DPM;  Location: [facility] OR 2ND FLR;  Service: Podiatry;  Laterality: Left;  t    DEBRIDEMENT OF FOOT Bilateral 4/29/2021    Procedure: DEBRIDEMENT, FOOT;  Surgeon: Mai Burrell DPM;  Location: [facility] OR 1ST FLR;  Service: Podiatry;  Laterality: Bilateral;  Graft application    DEBRIDEMENT OF FOOT Left 7/31/2023    Procedure: DEBRIDEMENT, FOOT;  Surgeon: Marivel Peterson DPM;  Location: [facility] OR 2ND FLR;  Service: Podiatry;  Laterality: Left;    TOE AMPUTATION Right 06/05/2015    TOE AMPUTATION Right 01/19/2017    XI ROBOTIC COLECTOMY, RIGHT N/A 4/25/2022    Procedure: XI ROBOTIC COLECTOMY, RIGHT (lithotomy, eras low);  Surgeon: Erik Stout MD;  Location: [facility] OR 2ND FLR;  Service: Colon and Rectal;  Laterality: N/A;  Paruch to Goodwin    XI ROBOTIC COLECTOMY, RIGHT  4/25/2022    Procedure: ;  Surgeon: Erik Stout MD;  Location: [facility] OR 2ND FLR;  Service: Colon and Rectal;;       Review of patient's allergies indicates:  No Known Allergies  Family History       Problem Relation (Age of Onset)    Cancer Mother    Cataracts Father    Diabetes Mother, Sister    Heart disease Father, Sister    Hypertension Mother, Father, Maternal Aunt    No Known Problems Brother, Sister, Maternal Uncle, Paternal Aunt, Paternal Uncle, Maternal Grandmother, Maternal Grandfather, Paternal Grandmother, Paternal Grandfather          Tobacco Use    Smoking status: Never    Smokeless tobacco: Never   Substance and Sexual Activity    Alcohol use: No    Drug use: No    Sexual activity: Yes     Partners: Female     Birth control/protection: Condom     Review of Systems   Reason unable to perform ROS: limited 2/2 lethargy.   Gastrointestinal:  Positive for nausea and vomiting. Negative for abdominal pain, blood in stool, constipation, diarrhea and rectal pain.     Objective:      Vital Signs (Most Recent):  Temp: (!) 95.2 °F (35.1 °C) (10/16/24 1701)  Pulse: 102 (10/16/24 1701)  Resp: 12 (10/16/24 1701)  BP: (!) 161/82 (10/16/24 1701)  SpO2: 100 % (10/16/24 1701) Vital Signs (24h Range):  Temp:  [93 °F (33.9 °C)-95.2 °F (35.1 °C)] 95.2 °F (35.1 °C)  Pulse:  [101-111] 102  Resp:  [12-19] 12  SpO2:  [97 %-100 %] 100 %  BP: (102-161)/(56-98) 161/82     Weight: 97.5 kg (214 lb 15.2 oz) (10/16/24 1533)  Body mass index is 28.36 kg/m².      Intake/Output Summary (Last 24 hours) at 10/16/2024 1732  Last data filed at 10/16/2024 1634  Gross per 24 hour   Intake 2559.47 ml   Output 1700 ml   Net 859.47 ml       Lines/Drains/Airways       Drain  Duration                  Urethral Catheter 10/16/24 1312 Temperature probe 16 Fr. <1 day              Peripheral Intravenous Line  Duration                  Peripheral IV - Single Lumen 10/16/24 1123 18 G Left Antecubital <1 day         Peripheral IV - Single Lumen 10/16/24 1128 18 G Right Antecubital <1 day         Peripheral IV - Single Lumen 10/16/24 1249 20 G Anterior;Distal;Right Forearm <1 day                     Physical Exam  Constitutional:       Appearance: He is ill-appearing.   HENT:      Head: Normocephalic and atraumatic.   Eyes:      General: No scleral icterus.  Cardiovascular:      Rate and Rhythm: Tachycardia present.      Pulses: Normal pulses.   Pulmonary:      Effort: Pulmonary effort is normal. No respiratory distress.   Abdominal:      General: Abdomen is flat. There is no distension.      Palpations: Abdomen is soft.      Tenderness: There is no abdominal tenderness. There is no guarding or rebound.   Musculoskeletal:         General: No deformity.   Skin:     General: Skin is warm and dry.      Comments: Foot wound   Neurological:      Mental Status: He is lethargic and disoriented.   Psychiatric:      Comments: Unable to assess          Significant Labs:  CBC:   Recent Labs   Lab 10/16/24  1114 10/16/24  1122 10/16/24  1259  10/16/24  1458   WBC 10.30  --  8.21 8.41   HGB 13.1*  --  13.1* 13.2*   HCT 43.9 42 42.5 42.5   *  --  456* 459*     CMP:   Recent Labs   Lab 10/16/24  1114 10/16/24  1458   * 256*   CALCIUM 11.2* 9.2   ALBUMIN 2.6*  --    PROT 9.4*  --    * 149*   K 4.4 2.9*   CO2 8* 10*   * 118*   BUN 39* 32*   CREATININE 3.1* 2.3*   ALKPHOS 166*  --    ALT 8*  --    AST 8*  --    BILITOT 0.2  --      All pertinent lab results from the last 24 hours have been reviewed.    Significant Imaging:  Imaging results within the past 24 hours have been reviewed.

## 2024-10-16 NOTE — ED NOTES
I-STAT Chem-8+ Results:   Value Reference Range   Sodium 147 136-145 mmol/L   Potassium  4.4 3.5-5.1 mmol/L   Chloride 118  mmol/L   Ionized Calcium 1.33 1.06-1.42 mmol/L   CO2 (measured) 11 23-29 mmol/L   Glucose 379  mg/dL   BUN 47 6-30 mg/dL   Creatinine 1.6 0.5-1.4 mg/dL   Hematocrit 42 36-54%

## 2024-10-16 NOTE — ED PROVIDER NOTES
"Encounter Date: 10/16/2024       History     Chief Complaint   Patient presents with    Hematemesis     EMS reports "vomiting blood for 2 days"/ weak     HPI  Indio Ryder Jr. is a 54-year-old male with a history of diabetes with recurrent DKA, colon adenocarcinoma, hyperlipidemia, hypertension, shoulder impingement, osteomyelitis of right foot presenting with hematemesis associated with nausea and vomiting.  Per EMS, they report patient was lethargic, hypotensive and tachycardic, found at home with a bucket of blood next to him.  He reports emesis for the last 2 days associated with hematemesis.  Per EMS, blood sugar 330s.  Patient states that he has been feeling sick with nausea vomiting but no abdominal pain for the last 2 days.  He has a family member present at bedside.  She reports that she has not visited him and heard from him for the last 2 days.  He denies any fevers or chills, falls or trauma.  He denies any hematochezia or melena.    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    Right foot    Colon adenocarcinoma 04/12/2022    Diabetic ketoacidosis without coma associated with type 2 diabetes mellitus 05/30/2017    Diabetic ulcer of right foot associated with type 2 diabetes mellitus 06/03/2015    Disorder of kidney and ureter     Essential hypertension 06/06/2013    Group B streptococcal infection 01/13/2017    Mixed hyperlipidemia 08/12/2014    Septic arthritis of left foot 04/28/2021    Shoulder impingement 08/12/2014    Subacute osteomyelitis of right foot 01/12/2017    Streptococcus agalactiae, Actinomyces odontolyticus    Traumatic amputation of fifth toe of right foot 07/02/2015    Type 2 diabetes mellitus with diabetic neuropathy, with long-term current use of insulin 05/03/2016     Past Surgical History:   Procedure Laterality Date    COLONOSCOPY Right 1/19/2022    Procedure: COLONOSCOPY;  Surgeon: " Tj Haile MD;  Location: [facility] ENDO (4TH FLR);  Service: Endoscopy;  Laterality: Right;  previous order un-usable/poor prep 1/18/22  RAPID COVID test arrival 12:20  instructions handed to pt-     COLONOSCOPY N/A 3/21/2022    Procedure: COLONOSCOPY;  Surgeon: Sheng Haque MD;  Location: [facility] ENDO (2ND FLR);  Service: Endoscopy;  Laterality: N/A;  Colonoscopy with AES for hepatix flexure polyp removal, 2nd floor  Pt is fully vaccinated-DS  Pt prescribed Plavix by Dr. Stahl in Jan. 2022, however pt states that he never started taking it-DS  3/16/22-Instructions sent via portal-DS    COLONOSCOPY N/A 9/13/2023    Procedure: COLONOSCOPY;  Surgeon: Erik Stout MD;  Location: [facility] ENDO (4TH FLR);  Service: Colon and Rectal;  Laterality: N/A;  Referred by Dr. Stout, [facility], [facility], portal -ml    DEBRIDEMENT OF FOOT Left 7/14/2019    Procedure: DEBRIDEMENT, FOOT, with left 5th ray amputation;  Surgeon: Sis Hickman DPM;  Location: [facility] OR 2ND FLR;  Service: Podiatry;  Laterality: Left;    DEBRIDEMENT OF FOOT Left 7/17/2019    Procedure: DEBRIDEMENT, FOOT with leftt 5th ray partial amputation with Neox Graft;  Surgeon: Mai Burrell DPM;  Location: [facility] OR 2ND FLR;  Service: Podiatry;  Laterality: Left;  t    DEBRIDEMENT OF FOOT Bilateral 4/29/2021    Procedure: DEBRIDEMENT, FOOT;  Surgeon: Mai Burrell DPM;  Location: [facility] OR 1ST FLR;  Service: Podiatry;  Laterality: Bilateral;  Graft application    DEBRIDEMENT OF FOOT Left 7/31/2023    Procedure: DEBRIDEMENT, FOOT;  Surgeon: Marivel Peterson DPM;  Location: [facility] OR 2ND FLR;  Service: Podiatry;  Laterality: Left;    TOE AMPUTATION Right 06/05/2015    TOE AMPUTATION Right 01/19/2017    XI ROBOTIC COLECTOMY, RIGHT N/A 4/25/2022    Procedure: XI ROBOTIC COLECTOMY, RIGHT (lithotomy, eras low);  Surgeon: Erik Stout MD;  Location: [facility] OR 2ND FLR;  Service: Colon and Rectal;  Laterality: N/A;  Paruch to Goodwin    XI ROBOTIC  COLECTOMY, RIGHT  4/25/2022    Procedure: ;  Surgeon: Erik Stout MD;  Location: [facility] OR [address] [place] [address];  Service: Colon and Rectal;;     Family History   Adopted: Yes   Problem Relation Name Age of Onset    Hypertension Mother      Cancer Mother          breast    Diabetes Mother      Hypertension Father      Cataracts Father      Heart disease Father          mi    Diabetes Sister Michelle     No Known Problems Brother      Heart disease Sister Manuela         MI    No Known Problems Sister      Hypertension Maternal Aunt      No Known Problems Maternal Uncle      No Known Problems Paternal Aunt      No Known Problems Paternal Uncle      No Known Problems Maternal Grandmother      No Known Problems Maternal Grandfather      No Known Problems Paternal Grandmother      No Known Problems Paternal Grandfather      Amblyopia Neg Hx      Blindness Neg Hx      Glaucoma Neg Hx      Macular degeneration Neg Hx      Retinal detachment Neg Hx      Strabismus Neg Hx      Stroke Neg Hx      Thyroid disease Neg Hx       Social History     Tobacco Use    Smoking status: Never    Smokeless tobacco: Never   Substance Use Topics    Alcohol use: No    Drug use: No     Review of Systems  All other systems reviewed and were negative; see HPI also for additional ROS.    Physical Exam     Initial Vitals [10/16/24 1044]   BP Pulse Resp Temp SpO2   (!) 102/56 (!) 111 15 (!) 94.1 °F (34.5 °C) 100 %      MAP       --         Physical Exam    Nursing note and vitals reviewed.      Gen/Constitutional: lethargic, ill-appearing with dry mucous membranes and crusted blood around his mouth  Head: Normocephalic, Atraumatic  Neck: supple, no masses or LAD, no JVD  Eyes: PERRLA, conjunctiva clear  Ears, Nose and Throat: No rhinorrhea or stridor.  Dried blood around his mouth  Cardiac:  Tachycardic heart rate, Reg Rhythm, No murmur  Pulmonary: CTA Bilat, no wheezes, rhonchi, rales.  No increased work of breathing.  GI: Abdomen soft, non-tender,  non-distended; no rebound or guarding  : No CVA tenderness.  Musculoskeletal: Extremities cool, no erythema, no edema  Skin: No rashes, cyanosis or jaundice.  Dry cool skin; nonhealing wound on the bottom of his left heel which discharge, odor and necrotic tissue, see imaging below.  Neuro: Alert and Oriented x 3; sluggish and lethargic without disorientation, No focal motor or sensory deficits.    Psych: Normal affect            ED Course   Critical Care    Date/Time: 10/16/2024 2:45 PM    Performed by: Willam Metzger DO  Authorized by: Willam Metzger DO  Direct patient critical care time: 15 minutes  Additional history critical care time: 15 minutes  Ordering / reviewing critical care time: 25 minutes  Documentation critical care time: 8 minutes  Consulting other physicians critical care time: 10 minutes  Consult with family critical care time: 5 minutes  Total critical care time (exclusive of procedural time) : 78 minutes  Critical care was necessary to treat or prevent imminent or life-threatening deterioration of the following conditions: dehydration, sepsis, endocrine crisis and metabolic crisis.  Critical care was time spent personally by me on the following activities: blood draw for specimens, development of treatment plan with patient or surrogate, discussions with consultants, evaluation of patient's response to treatment, examination of patient, obtaining history from patient or surrogate, ordering and performing treatments and interventions, ordering and review of laboratory studies, ordering and review of radiographic studies, pulse oximetry, re-evaluation of patient's condition and review of old charts.        Labs Reviewed   CBC W/ AUTO DIFFERENTIAL - Abnormal       Result Value    WBC 10.30      RBC 4.59 (*)     Hemoglobin 13.1 (*)     Hematocrit 43.9      MCV 96      MCH 28.5      MCHC 29.8 (*)     RDW 14.7 (*)     Platelets 464 (*)     MPV 9.7      Immature Granulocytes 0.4      Gran #  (ANC) 9.0 (*)     Immature Grans (Abs) 0.04      Lymph # 0.6 (*)     Mono # 0.7      Eos # 0.0      Baso # 0.01      nRBC 0      Gran % 86.9 (*)     Lymph % 6.0 (*)     Mono % 6.6      Eosinophil % 0.0      Basophil % 0.1      Differential Method Automated      Narrative:     Release to patient->Immediate   COMPREHENSIVE METABOLIC PANEL - Abnormal    Sodium 147 (*)     Potassium 4.4      Chloride 112 (*)     CO2 8 (*)     Glucose 375 (*)     BUN 39 (*)     Creatinine 3.1 (*)     Calcium 11.2 (*)     Total Protein 9.4 (*)     Albumin 2.6 (*)     Total Bilirubin 0.2      Alkaline Phosphatase 166 (*)     AST 8 (*)     ALT 8 (*)     eGFR 22.7 (*)     Anion Gap 27 (*)     Narrative:     Release to patient->Immediate   URINALYSIS, REFLEX TO URINE CULTURE - Abnormal    Specimen UA Urine, Catheterized      Color, UA Yellow      Appearance, UA Clear      pH, UA 6.0      Specific Gravity, UA 1.020      Protein, UA 1+ (*)     Glucose, UA 4+ (*)     Ketones, UA 3+ (*)     Bilirubin (UA) Negative      Occult Blood UA 1+ (*)     Nitrite, UA Negative      Leukocytes, UA Negative      Narrative:     Specimen Source->Urine   BETA - HYDROXYBUTYRATE, SERUM - Abnormal    Beta-Hydroxybutyrate 4.3 (*)    CBC W/ AUTO DIFFERENTIAL - Abnormal    WBC 8.21      RBC 4.51 (*)     Hemoglobin 13.1 (*)     Hematocrit 42.5      MCV 94      MCH 29.0      MCHC 30.8 (*)     RDW 16.0 (*)     Platelets 456 (*)     MPV 10.2      Immature Granulocytes 0.7 (*)     Gran # (ANC) 6.7      Immature Grans (Abs) 0.06 (*)     Lymph # 0.6 (*)     Mono # 0.8      Eos # 0.0      Baso # 0.02      nRBC 0      Gran % 81.8 (*)     Lymph % 7.8 (*)     Mono % 9.5      Eosinophil % 0.0      Basophil % 0.2      Differential Method Automated     URINALYSIS MICROSCOPIC - Abnormal    RBC, UA 1      WBC, UA 1      Bacteria None      Yeast, UA None      Squam Epithel, UA 1      Hyaline Casts, UA 7 (*)     Amorphous, UA Rare      Microscopic Comment SEE COMMENT      Narrative:      Specimen Source->Urine   ISTAT PROCEDURE - Abnormal    POC Glucose 379 (*)     POC BUN 47 (*)     POC Creatinine 1.6 (*)     POC Sodium 147 (*)     POC Potassium 4.4      POC Chloride 118 (*)     POC TCO2 (MEASURED) 11 (*)     POC Ionized Calcium 1.33      POC Hematocrit 42      Sample ANTONIO     ISTAT PROCEDURE - Abnormal    POC PH 7.171 (*)     POC PCO2 27.0 (*)     POC PO2 38 (*)     POC HCO3 9.9 (*)     POC BE -19 (*)     POC SATURATED O2 59      POC TCO2 11 (*)     Sample VENOUS      Site Other      Allens Test N/A     POCT GLUCOSE - Abnormal    POCT Glucose 386 (*)    POCT GLUCOSE - Abnormal    POCT Glucose 311 (*)    HIV 1 / 2 ANTIBODY    HIV 1/2 Ag/Ab Non-reactive      Narrative:     Release to patient->Immediate   HEPATITIS C ANTIBODY    Hepatitis C Ab Non-reactive      Narrative:     Release to patient->Immediate   PROTIME-INR    Prothrombin Time 11.4      INR 1.0      Narrative:     Release to patient->Immediate   APTT    aPTT 28.2      Narrative:     Release to patient->Immediate   LIPASE    Lipase 26      Narrative:     Release to patient->Immediate   HEMOGLOBIN A1C   TYPE & SCREEN    Group & Rh A POS      Indirect Alise NEG      Specimen Outdate 10/19/2024 23:59     ISTAT LACTATE    POC Lactate 1.92      Sample VENOUS      Site Other      Allens Test N/A     ISTAT CHEM8   POCT GLUCOSE MONITORING CONTINUOUS   POCT GLUCOSE MONITORING CONTINUOUS   PREPARE RBC SOFT    UNIT NUMBER [id number]      Product Code E5423F22      DISPENSE STATUS CROSSMATCHED      CODING SYSTEM UZWU824      Unit Blood Type Code 6200      Unit Blood Type A POS      Unit Expiration [id number]      CROSSMATCH INTERPRETATION Compatible      UNIT NUMBER [id number]      Product Code H3606Y28      DISPENSE STATUS CROSSMATCHED      CODING SYSTEM KPKE800      Unit Blood Type Code 6200      Unit Blood Type A POS      Unit Expiration [id number]      CROSSMATCH INTERPRETATION Compatible      UNIT NUMBER [id number]      Product  Code L9848Y22      DISPENSE STATUS CROSSMATCHED      CODING SYSTEM TXBW080      Unit Blood Type Code 6200      Unit Blood Type A POS      Unit Expiration [id number]      CROSSMATCH INTERPRETATION Compatible       EKG Readings: (Independently Interpreted)   Initial Reading: No STEMI. Previous EKG: Compared with most recent EKG Rhythm: Sinus Tachycardia. Heart Rate: 109. ST Segments: Non-Specific ST Segment Depression.     ECG Results              EKG 12-lead (Edited Result - FINAL)        Collection Time Result Time QRS Duration OHS QTC Calculation    10/16/24 11:33:34 10/16/24 11:49:38 90 484                     Edited Result - FINAL by Interface, Lab In [facility] (10/16/24 11:49:41)                   Narrative:    Test Reason : R00.0,    Vent. Rate : 109 BPM     Atrial Rate : 109 BPM     P-R Int : 116 ms          QRS Dur : 090 ms      QT Int : 360 ms       P-R-T Axes : 070 079 -31 degrees     QTc Int : 484 ms    Sinus tachycardia  T wave abnormality, consider inferior ischemia  Abnormal ECG  When compared with ECG of 19-JUL-2023 15:37,  T wave inversion more evident in Inferior leads  Reconfirmed by Elvin Martinez MD ([id number]) on 10/16/2024 11:49:34 AM    Referred By: [email or web address]   SELF           Confirmed By:Elvin Martinez MD                                  Imaging Results    None          Medications   ondansetron injection 8 mg (8 mg Intravenous Not Given 10/16/24 1145)   pantoprazole injection 40 mg (40 mg Intravenous Given 10/16/24 1235)   octreotide (SANDOSTATIN) 500 mcg in 0.9% NaCl 100 mL infusion (50 mcg/hr Intravenous Verify Only 10/16/24 1807)   sodium chloride 0.9% flush 10 mL (has no administration in time range)   dextrose 5 % and 0.45 % NaCl infusion (has no administration in time range)   dextrose 10% bolus 125 mL 125 mL (has no administration in time range)   dextrose 10% bolus 250 mL 250 mL (has no administration in time range)   0.9%  NaCl infusion (for blood administration) (has no administration in  time range)   insulin regular in 0.9 % NaCl 100 unit/100 mL (1 unit/mL) infusion (0.1 Units/kg/hr × 97.5 kg Intravenous New Bag 10/16/24 1839)   piperacillin-tazobactam (ZOSYN) 4.5 g in D5W 100 mL IVPB (MB+) ( Intravenous Verify Only 10/16/24 1807)   vancomycin - pharmacy to dose (has no administration in time range)   ondansetron injection 4 mg (has no administration in time range)   0.9 % NaCl with KCl 20 mEq infusion (has no administration in time range)   octreotide injection 50 mcg (50 mcg Intravenous Given 10/16/24 1130)   cefTRIAXone injection 1 g (1 g Intravenous Given 10/16/24 1115)   pantoprazole injection 80 mg (80 mg Intravenous Given 10/16/24 1129)   ondansetron injection 8 mg (8 mg Intravenous Given 10/16/24 1130)   lactated ringers bolus 1,000 mL (0 mLs Intravenous Stopped 10/16/24 1308)   vancomycin 2 g in dextrose 5 % 500 mL IVPB (0 mg Intravenous Stopped 10/16/24 1512)   piperacillin-tazobactam (ZOSYN) 4.5 g in D5W 100 mL IVPB (MB+) (0 g Intravenous Stopped 10/16/24 1310)   lactated ringers bolus 1,000 mL (0 mLs Intravenous Stopped 10/16/24 1706)   potassium chloride 10 mEq in 100 mL IVPB (20 mEq Intravenous New Bag 10/16/24 1943)     Medical Decision Making  Indio ALVARADO Britni Anderson is a 54-year-old male with a history of diabetes with recurrent DKA, colon adenocarcinoma, hyperlipidemia, hypertension, shoulder impingement, osteomyelitis of right foot presenting with hematemesis associated with nausea and vomiting.     Differential diagnosis includes:  Upper GI bleed, DKA, metabolic acidosis, sepsis, bacteremia, osteomyelitis    Amount and/or Complexity of Data Reviewed  Independent Historian: EMS     Details: 10/16/24:  Brought in by EMS after being down at home for 2 days with hematemesis, nausea and vomiting, found to have blood sugar greater than 300 with concern for upper GI bleed and DKA  Labs: ordered.     Details: Anion gap - 27, bicarb less than 9, pH 7.1  ECG/medicine tests: ordered and  independent interpretation performed.  Discussion of management or test interpretation with external provider(s): Critical Care Medicine team consult, discussed case with ICU team, patient admitted to ICU level of care    Risk  Prescription drug management.  Drug therapy requiring intensive monitoring for toxicity.  Decision regarding hospitalization.    Emergent evaluation of a patient presenting via EMS lethargic, hypotensive and tachycardic found at home with a bucket of blood next to him.  Per EMS blood sugar in the 330s, patient has been reported nausea and vomiting with concern for upper GI bleed.  Patient was placed on telemetry and cardiac monitoring including pulse oximetry, 2 large-bore IVs, type and screen and GI bleed labs.  Given high glucose, DKA workup shows beta hydroxybutyrate of 4.3 and VBG with blood gas of 7.1 with an anion gap of 27.  Suspect DKA complicating this matter.  He also has severe acidosis and a festering wound on his left lower extremity.  Given concern for sepsis as well as DKA and GI bleed, a broad workup conducted.  Patient given broad-spectrum antibiotics, treated with PPI, octreotide and insulin drip has been started.  Discussed case with Critical Care Medicine team, patient admitted to ICU level of care.  Please see critical care note for critical care time.  Case was also discussed with GI, who will follow along and make recommendations.                                  Clinical Impression:  Final diagnoses:  [K92.0] Gastrointestinal hemorrhage with hematemesis (Primary)  [R00.0] Tachycardia  [E11.10] Diabetic ketoacidosis without coma associated with type 2 diabetes mellitus          ED Disposition Condition    Admit Stable               Willam eMtzger DO, [place]  Emergency Staff Physician   Dept of Emergency Medicine   [facility]  Spectralink: [id number]        Disclaimer: This note has been generated using voice-recognition software. There may be typographical  errors that have been missed during proof-reading.       Willam Metzger, DO  10/16/24 2009

## 2024-10-16 NOTE — CONSULTS
Consult received. Pt seen and examined. Will be admitted to critical care medicine. Full H&P to follow.     Evelia John DNP, [facility]  Critical Care Medicine  10/16/2024 12:53 PM

## 2024-10-16 NOTE — ASSESSMENT & PLAN NOTE
Blood sugar on arrival 375 with a bicarb 8, anion gap 27, BHB 4.3 and pH 7.17    Plan:  - Start insulin gtt per protocol with q1h accuchecks while on the drip.    - Once Bicarb >18, Anion gap <10, Glucose <200 on 2 readings and able to tolerate PO intake without nausea and vomiting, transition to subq insulin with a 1-2 h overlap with drip.    - Start LR at 125cc/hr.  Once blood sugar is 200, change IVF to D5 1/2 NS at 50cc/h  --BMP, Mg, Phos q4hrs  - F/u a1c

## 2024-10-16 NOTE — PLAN OF CARE
Problem: Adult Inpatient Plan of Care  Goal: Plan of Care Review  Outcome: Progressing  Goal: Patient-Specific Goal (Individualized)  Outcome: Progressing  Goal: Absence of Hospital-Acquired Illness or Injury  Outcome: Progressing  Goal: Optimal Comfort and Wellbeing  Outcome: Progressing  Goal: Readiness for Transition of Care  Outcome: Progressing     Problem: Infection  Goal: Absence of Infection Signs and Symptoms  Outcome: Progressing     Problem: Diabetes Comorbidity  Goal: Blood Glucose Level Within Targeted Range  Outcome: Progressing     Problem: Acute Kidney Injury/Impairment  Goal: Fluid and Electrolyte Balance  Outcome: Progressing  Goal: Improved Oral Intake  Outcome: Progressing  Goal: Effective Renal Function  Outcome: Progressing     Problem: Wound  Goal: Optimal Coping  Outcome: Progressing  Goal: Optimal Functional Ability  Outcome: Progressing  Goal: Absence of Infection Signs and Symptoms  Outcome: Progressing  Goal: Improved Oral Intake  Outcome: Progressing  Goal: Optimal Pain Control and Function  Outcome: Progressing  Goal: Skin Health and Integrity  Outcome: Progressing  Goal: Optimal Wound Healing  Outcome: Progressing     MICU DAILY GOALS     Family/Goals of care/Code Status   Code Status: Full Code    24H Vital Sign Range  Temp:  [93 °F (33.9 °C)-94.8 °F (34.9 °C)]   Pulse:  [101-111]   Resp:  [14-19]   BP: (102-158)/(56-98)   SpO2:  [97 %-100 %]      Shift Events (include procedures and significant events)   No acute events throughout shift    AWAKE RASS: Goal -    Actual -      Restraint necessity: Not necessary   BREATHE SBT: Not intubated    Coordinate A & B, analgesics/sedatives Pain: managed   SAT: Not intubated   Delirium CAM-ICU: Overall CAM-ICU: Negative   Early(intubated/ Progressive (non-intubated) Mobility MOVE Screen (INTUBATED ONLY): Not intubated    Activity: Activity Management: Rolling - L1   Feeding/Nutrition Diet order:  ,     Thrombus DVT prophylaxis: VTE Required  Core Measure: Pharmacological prophylaxis initiated/maintained   HOB Elevation Head of Bed (HOB) Positioning: HOB elevated   Ulcer Prophylaxis GI: yes   Glucose control managed     Skin Skin assessment:     Sacrum intact/not altered? Yes  Heels intact/not altered? No  Surgical wound? No    CHECK ONE!   (no altered skin or altered skin) and sub boxes:  [] No Altered Skin Integrity Present    []Prevention Measures Documented    [] Altered Skin Integrity Present or Discovered   [] LDA present in EPIC, daily doc completed              [] LDA added if not in EPIC (describe wound).                    When describing wound, do not stage, use descriptive words only.    [] Wound Image Taken (required on admit,                   transfer/discharge and every Tuesday)    Wound Care Consulted? Yes   Bowel Function no issues    Indwelling Catheter Necessity      Urethral Catheter 10/16/24 1312 Temperature probe 16 Fr.-Reason for Continuing Urinary Catheterization: Critically ill in ICU and requiring hourly monitoring of intake/output       Critically ill   De-escalation Antibiotics No        VS and assessment per flow sheet, patient progressing towards goals as tolerated, plan of care reviewed with [unfilled] and family, all concerns addressed, will continue to monitor.

## 2024-10-16 NOTE — SUBJECTIVE & OBJECTIVE
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    Right foot    Colon adenocarcinoma 04/12/2022    Diabetic ketoacidosis without coma associated with type 2 diabetes mellitus 05/30/2017    Diabetic ulcer of right foot associated with type 2 diabetes mellitus 06/03/2015    Disorder of kidney and ureter     Essential hypertension 06/06/2013    Group B streptococcal infection 01/13/2017    Mixed hyperlipidemia 08/12/2014    Septic arthritis of left foot 04/28/2021    Shoulder impingement 08/12/2014    Subacute osteomyelitis of right foot 01/12/2017    Streptococcus agalactiae, Actinomyces odontolyticus    Traumatic amputation of fifth toe of right foot 07/02/2015    Type 2 diabetes mellitus with diabetic neuropathy, with long-term current use of insulin 05/03/2016       Past Surgical History:   Procedure Laterality Date    COLONOSCOPY Right 1/19/2022    Procedure: COLONOSCOPY;  Surgeon: Tj Haile MD;  Location: [facility] (4TH FLR);  Service: Endoscopy;  Laterality: Right;  previous order un-usable/poor prep 1/18/22  RAPID COVID test arrival 12:20  instructions handed to pt- sm    COLONOSCOPY N/A 3/21/2022    Procedure: COLONOSCOPY;  Surgeon: Sheng Haque MD;  Location: [facility] (2ND FLR);  Service: Endoscopy;  Laterality: N/A;  Colonoscopy with AES for hepatix flexure polyp removal, 2nd floor  Pt is fully vaccinated-DS  Pt prescribed Plavix by Dr. Stahl in Jan. 2022, however pt states that he never started taking it-DS  3/16/22-Instructions sent via portal-DS    COLONOSCOPY N/A 9/13/2023    Procedure: COLONOSCOPY;  Surgeon: Erik Stout MD;  Location: [facility] (4TH FLR);  Service: Colon and Rectal;  Laterality: N/A;  Referred by timur Saunders, Meds, portal -ml    DEBRIDEMENT OF FOOT Left 7/14/2019    Procedure: DEBRIDEMENT, FOOT, with left 5th ray amputation;  Surgeon: Sis Hickman DPM;  Location: [facility] OR 2ND FLR;  Service: Podiatry;  Laterality: Left;     DEBRIDEMENT OF FOOT Left 7/17/2019    Procedure: DEBRIDEMENT, FOOT with leftt 5th ray partial amputation with Neox Graft;  Surgeon: Mai Burrell DPM;  Location: [facility] OR 2ND FLR;  Service: Podiatry;  Laterality: Left;  t    DEBRIDEMENT OF FOOT Bilateral 4/29/2021    Procedure: DEBRIDEMENT, FOOT;  Surgeon: Mai Burrell DPM;  Location: [facility] OR 1ST FLR;  Service: Podiatry;  Laterality: Bilateral;  Graft application    DEBRIDEMENT OF FOOT Left 7/31/2023    Procedure: DEBRIDEMENT, FOOT;  Surgeon: Marivel Peterson DPM;  Location: [facility] OR 2ND FLR;  Service: Podiatry;  Laterality: Left;    TOE AMPUTATION Right 06/05/2015    TOE AMPUTATION Right 01/19/2017    XI ROBOTIC COLECTOMY, RIGHT N/A 4/25/2022    Procedure: XI ROBOTIC COLECTOMY, RIGHT (lithotomy, eras low);  Surgeon: Erik Stout MD;  Location: [facility] OR 2ND FLR;  Service: Colon and Rectal;  Laterality: N/A;  Paruch to Goodwin    XI ROBOTIC COLECTOMY, RIGHT  4/25/2022    Procedure: ;  Surgeon: Erik Stout MD;  Location: [facility] OR 2ND FLR;  Service: Colon and Rectal;;       Review of patient's allergies indicates:  No Known Allergies    Family History       Problem Relation (Age of Onset)    Cancer Mother    Cataracts Father    Diabetes Mother, Sister    Heart disease Father, Sister    Hypertension Mother, Father, Maternal Aunt    No Known Problems Brother, Sister, Maternal Uncle, Paternal Aunt, Paternal Uncle, Maternal Grandmother, Maternal Grandfather, Paternal Grandmother, Paternal Grandfather          Tobacco Use    Smoking status: Never    Smokeless tobacco: Never   Substance and Sexual Activity    Alcohol use: No    Drug use: No    Sexual activity: Yes     Partners: Female     Birth control/protection: Condom      Review of Systems   Reason unable to perform ROS: Pt largely unresponsive due to weakness/lethargy.   HENT:  Positive for dental problem.    Respiratory:  Negative for chest tightness and shortness of breath.     Cardiovascular:  Negative for chest pain.   Gastrointestinal:  Positive for nausea and vomiting.   Skin:  Positive for wound.     Objective:     Vital Signs (Most Recent):  Temp: (!) 93.9 °F (34.4 °C) (10/16/24 1515)  Pulse: 103 (10/16/24 1515)  Resp: 16 (10/16/24 1515)  BP: 117/67 (10/16/24 1515)  SpO2: 100 % (10/16/24 1515) Vital Signs (24h Range):  Temp:  [93 °F (33.9 °C)-94.1 °F (34.5 °C)] 93.9 °F (34.4 °C)  Pulse:  [101-111] 103  Resp:  [15-19] 16  SpO2:  [97 %-100 %] 100 %  BP: (102-158)/(56-98) 117/67   Weight: 97.5 kg (214 lb 15.2 oz)  Body mass index is 28.36 kg/m².      Intake/Output Summary (Last 24 hours) at 10/16/2024 1547  Last data filed at 10/16/2024 1506  Gross per 24 hour   Intake 1826.29 ml   Output 1700 ml   Net 126.29 ml          Physical Exam  Vitals and nursing note reviewed.   Constitutional:       Appearance: He is ill-appearing and toxic-appearing.      Comments: Lethargic, minimally responsive to questioning   HENT:      Head: Normocephalic.   Eyes:      General: No scleral icterus.  Cardiovascular:      Rate and Rhythm: Normal rate and regular rhythm.      Heart sounds: Normal heart sounds. No murmur heard.  Pulmonary:      Effort: Pulmonary effort is normal. No respiratory distress.      Breath sounds: Normal breath sounds. No wheezing or rales.   Abdominal:      General: Abdomen is flat.      Palpations: Abdomen is soft.   Musculoskeletal:         General: Normal range of motion.      Cervical back: Normal range of motion.      Right lower leg: No edema.      Left lower leg: No edema.   Skin:     General: Skin is warm and dry.      Comments: Left foot wrapped in clean bandage   Neurological:      Motor: Weakness (diffuse) present.            Vents:     Lines/Drains/Airways       Drain  Duration                  Urethral Catheter 10/16/24 1312 Temperature probe 16 Fr. <1 day              Peripheral Intravenous Line  Duration                  Peripheral IV - Single Lumen 10/16/24 1123  18 G Left Antecubital <1 day         Peripheral IV - Single Lumen 10/16/24 1128 18 G Right Antecubital <1 day         Peripheral IV - Single Lumen 10/16/24 1249 20 G Anterior;Distal;Right Forearm <1 day                  Significant Labs:    CBC/Anemia Profile:  Recent Labs   Lab 10/16/24  1114 10/16/24  1122 10/16/24  1251   WBC 10.30  --  8.21   HGB 13.1*  --  13.1*   HCT 43.9 42 42.5   *  --  456*   MCV 96  --  94   RDW 14.7*  --  16.0*        Chemistries:  Recent Labs   Lab 10/16/24  1114   *   K 4.4   *   CO2 8*   BUN 39*   CREATININE 3.1*   CALCIUM 11.2*   ALBUMIN 2.6*   PROT 9.4*   BILITOT 0.2   ALKPHOS 166*   ALT 8*   AST 8*       All pertinent labs within the past 24 hours have been reviewed.    Significant Imaging: I have reviewed all pertinent imaging results/findings within the past 24 hours.

## 2024-10-16 NOTE — ASSESSMENT & PLAN NOTE
Creatinine 3.1 on admit, baseline around 1.0  - Likely pre-renal from dehydration and volume losses/DKA/GI bleed    Plan:   Lab Results   Component Value Date    CREATININE 3.1 (H) 10/16/2024     - Check urine lytes and renal ultrasound if no improvement with adequate resuscitation   - Strict I&Os and daily weights   - Gentle IVF  - Avoid nephrotoxic agents such as NSAIDs, gadolinium and IV radiocontrast.  - Renally dose meds to current GFR.  - Maintain MAP > 65.

## 2024-10-16 NOTE — HPI
Indio Ryder Jr. is a 54-year-old male with a past medical history of diabetes with recurrent DKA, colon adenocarcinoma, hyperlipidemia, hypertension, shoulder impingement, osteomyelitis of right foot who presented with hematemesis associated with nausea and vomiting.  Per EMS, they report patient was lethargic, hypotensive and tachycardic, found at home with a bucket of blood next to him.  He reports emesis for the last 2 days associated with hematemesis.  Per EMS, blood sugar 330s.  Patient states that he has been feeling sick with nausea vomiting but no abdominal pain for the last 2 days.  He has a family member present at bedside.  She reports that she has not visited him and heard from him for the last 2 days, states he was his normal self on Monday morning. She believes he had some dental work with tooth extraction and a root canal done on Monday after she spoke with him.  He denies any fevers or chills, falls or trauma.  He denies any hematochezia or melena. At baseline he is independently capable of performing ADL's and drives. Currently lives alone.    In the ED, , , CO2 8, Glucose 375, BUN 39, Creatinine 3.1, Calcium 11.2, Alk Phos 166 AST 8, ALT 8, Anion gap 27, VBG 7.171/27/38/9.9/-19, hemoglobin 13.1, Beta hydroxybutyrate 4.3. He was treated with vanc/zosyn, PPI, octreotide, and insulin gtt has been started. Patient to be admitted to MICU for further management of GI bleed and DKA with resulting severe acidosis.

## 2024-10-17 PROBLEM — K92.2 ACUTE GI BLEEDING: Status: RESOLVED | Noted: 2024-10-16 | Resolved: 2024-10-17

## 2024-10-17 PROBLEM — G93.41 ENCEPHALOPATHY, METABOLIC: Status: ACTIVE | Noted: 2024-10-17

## 2024-10-17 LAB
ACANTHOCYTES BLD QL SMEAR: ABNORMAL
ACINETOBACTER CALCOACETICUS/BAUMANNII COMPLEX: NOT DETECTED
ALBUMIN SERPL BCP-MCNC: 2.2 G/DL (ref 3.5–5.2)
ALLENS TEST: ABNORMAL
ALP SERPL-CCNC: 137 U/L (ref 55–135)
ALT SERPL W/O P-5'-P-CCNC: 6 U/L (ref 10–44)
ANION GAP SERPL CALC-SCNC: 10 MMOL/L (ref 8–16)
ANION GAP SERPL CALC-SCNC: 11 MMOL/L (ref 8–16)
ANION GAP SERPL CALC-SCNC: 12 MMOL/L (ref 8–16)
ANION GAP SERPL CALC-SCNC: 13 MMOL/L (ref 8–16)
ANION GAP SERPL CALC-SCNC: 13 MMOL/L (ref 8–16)
ANION GAP SERPL CALC-SCNC: 16 MMOL/L (ref 8–16)
ANISOCYTOSIS BLD QL SMEAR: ABNORMAL
AST SERPL-CCNC: 13 U/L (ref 10–40)
AUER BODIES BLD QL SMEAR: ABNORMAL
BACTEROIDES FRAGILIS: NOT DETECTED
BASO STIPL BLD QL SMEAR: ABNORMAL
BASOPHILS # BLD AUTO: 0.03 K/UL (ref 0–0.2)
BASOPHILS # BLD AUTO: 0.04 K/UL (ref 0–0.2)
BASOPHILS # BLD AUTO: 0.05 K/UL (ref 0–0.2)
BASOPHILS # BLD AUTO: ABNORMAL K/UL (ref 0–0.2)
BASOPHILS NFR BLD: 0.4 % (ref 0–1.9)
BASOPHILS NFR BLD: 0.6 % (ref 0–1.9)
BASOPHILS NFR BLD: 0.7 % (ref 0–1.9)
BASOPHILS NFR BLD: ABNORMAL % (ref 0–1.9)
BILIRUB SERPL-MCNC: 0.2 MG/DL (ref 0.1–1)
BLASTS NFR BLD MANUAL: ABNORMAL %
BUN SERPL-MCNC: 15 MG/DL (ref 6–20)
BUN SERPL-MCNC: 17 MG/DL (ref 6–20)
BUN SERPL-MCNC: 17 MG/DL (ref 6–20)
BUN SERPL-MCNC: 23 MG/DL (ref 6–20)
BUN SERPL-MCNC: 24 MG/DL (ref 6–20)
BUN SERPL-MCNC: 29 MG/DL (ref 6–20)
BURR CELLS BLD QL SMEAR: ABNORMAL
CA-I BLDV-SCNC: 1.22 MMOL/L (ref 1.06–1.42)
CA-I BLDV-SCNC: 1.23 MMOL/L (ref 1.06–1.42)
CA-I BLDV-SCNC: 1.26 MMOL/L (ref 1.06–1.42)
CA-I BLDV-SCNC: 1.27 MMOL/L (ref 1.06–1.42)
CA-I BLDV-SCNC: 1.3 MMOL/L (ref 1.06–1.42)
CABOT RINGS BLD QL SMEAR: ABNORMAL
CALCIUM SERPL-MCNC: 10 MG/DL (ref 8.7–10.5)
CALCIUM SERPL-MCNC: 10 MG/DL (ref 8.7–10.5)
CALCIUM SERPL-MCNC: 10.1 MG/DL (ref 8.7–10.5)
CALCIUM SERPL-MCNC: 8.6 MG/DL (ref 8.7–10.5)
CALCIUM SERPL-MCNC: 9.7 MG/DL (ref 8.7–10.5)
CALCIUM SERPL-MCNC: 9.9 MG/DL (ref 8.7–10.5)
CANDIDA ALBICANS: NOT DETECTED
CANDIDA AURIS: NOT DETECTED
CANDIDA GLABRATA: NOT DETECTED
CANDIDA KRUSEI: NOT DETECTED
CANDIDA PARAPSILOSIS: NOT DETECTED
CANDIDA TROPICALIS: NOT DETECTED
CHLORIDE SERPL-SCNC: 114 MMOL/L (ref 95–110)
CHLORIDE SERPL-SCNC: 115 MMOL/L (ref 95–110)
CHLORIDE SERPL-SCNC: 116 MMOL/L (ref 95–110)
CHLORIDE SERPL-SCNC: 117 MMOL/L (ref 95–110)
CHLORIDE SERPL-SCNC: 119 MMOL/L (ref 95–110)
CHLORIDE SERPL-SCNC: 119 MMOL/L (ref 95–110)
CO2 SERPL-SCNC: 15 MMOL/L (ref 23–29)
CO2 SERPL-SCNC: 17 MMOL/L (ref 23–29)
CO2 SERPL-SCNC: 20 MMOL/L (ref 23–29)
CO2 SERPL-SCNC: 22 MMOL/L (ref 23–29)
CO2 SERPL-SCNC: 22 MMOL/L (ref 23–29)
CO2 SERPL-SCNC: 23 MMOL/L (ref 23–29)
CREAT SERPL-MCNC: 1.8 MG/DL (ref 0.5–1.4)
CREAT SERPL-MCNC: 2 MG/DL (ref 0.5–1.4)
CREAT SERPL-MCNC: 2.1 MG/DL (ref 0.5–1.4)
CREAT SERPL-MCNC: 2.3 MG/DL (ref 0.5–1.4)
CREAT SERPL-MCNC: 2.4 MG/DL (ref 0.5–1.4)
CREAT SERPL-MCNC: 2.5 MG/DL (ref 0.5–1.4)
CRYPTOCOCCUS NEOFORMANS/GATTII: NOT DETECTED
CTX-M GENE (ESBL PRODUCER): NOT DETECTED
DACRYOCYTES BLD QL SMEAR: ABNORMAL
DELSYS: ABNORMAL
DIFFERENTIAL METHOD BLD: ABNORMAL
DOHLE BOD BLD QL SMEAR: ABNORMAL
ENTEROBACTER CLOACAE COMPLEX: NOT DETECTED
ENTEROBACTERALES: ABNORMAL
ENTEROCOCCUS FAECALIS: NOT DETECTED
ENTEROCOCCUS FAECIUM: NOT DETECTED
EOSINOPHIL # BLD AUTO: 0 K/UL (ref 0–0.5)
EOSINOPHIL # BLD AUTO: ABNORMAL K/UL (ref 0–0.5)
EOSINOPHIL NFR BLD: 0 % (ref 0–8)
EOSINOPHIL NFR BLD: 0.1 % (ref 0–8)
EOSINOPHIL NFR BLD: ABNORMAL % (ref 0–8)
ERYTHROCYTE [DISTWIDTH] IN BLOOD BY AUTOMATED COUNT: 14.6 % (ref 11.5–14.5)
ERYTHROCYTE [DISTWIDTH] IN BLOOD BY AUTOMATED COUNT: 14.7 % (ref 11.5–14.5)
ERYTHROCYTE [DISTWIDTH] IN BLOOD BY AUTOMATED COUNT: 14.8 % (ref 11.5–14.5)
ERYTHROCYTE [DISTWIDTH] IN BLOOD BY AUTOMATED COUNT: ABNORMAL % (ref 11.5–14.5)
ESCHERICHIA COLI: NOT DETECTED
EST. GFR  (NO RACE VARIABLE): 29.4 ML/MIN/1.73 M^2
EST. GFR  (NO RACE VARIABLE): 30.9 ML/MIN/1.73 M^2
EST. GFR  (NO RACE VARIABLE): 32.5 ML/MIN/1.73 M^2
EST. GFR  (NO RACE VARIABLE): 36.3 ML/MIN/1.73 M^2
EST. GFR  (NO RACE VARIABLE): 38.4 ML/MIN/1.73 M^2
EST. GFR  (NO RACE VARIABLE): 43.6 ML/MIN/1.73 M^2
GIANT PLATELETS BLD QL SMEAR: ABNORMAL
GLUCOSE SERPL-MCNC: 138 MG/DL (ref 70–110)
GLUCOSE SERPL-MCNC: 202 MG/DL (ref 70–110)
GLUCOSE SERPL-MCNC: 213 MG/DL (ref 70–110)
GLUCOSE SERPL-MCNC: 220 MG/DL (ref 70–110)
GLUCOSE SERPL-MCNC: 239 MG/DL (ref 70–110)
GLUCOSE SERPL-MCNC: 250 MG/DL (ref 70–110)
HAEMOPHILUS INFLUENZAE: NOT DETECTED
HCO3 UR-SCNC: 25.3 MMOL/L (ref 24–28)
HCT VFR BLD AUTO: 32.4 % (ref 40–54)
HCT VFR BLD AUTO: 34.5 % (ref 40–54)
HCT VFR BLD AUTO: 34.8 % (ref 40–54)
HCT VFR BLD AUTO: 35.6 % (ref 40–54)
HCT VFR BLD AUTO: 35.7 % (ref 40–54)
HCT VFR BLD AUTO: ABNORMAL % (ref 40–54)
HEINZ BOD BLD QL SMEAR: ABNORMAL
HGB BLD-MCNC: 10.2 G/DL (ref 14–18)
HGB BLD-MCNC: 10.8 G/DL (ref 14–18)
HGB BLD-MCNC: 10.8 G/DL (ref 14–18)
HGB BLD-MCNC: 11.1 G/DL (ref 14–18)
HGB BLD-MCNC: 11.2 G/DL (ref 14–18)
HGB BLD-MCNC: ABNORMAL G/DL (ref 14–18)
HGB C CRY RBC QL MICRO: ABNORMAL
HOWELL-JOLLY BOD BLD QL SMEAR: ABNORMAL
HYPOCHROMIA BLD QL SMEAR: ABNORMAL
IMM GRANULOCYTES # BLD AUTO: 0.02 K/UL (ref 0–0.04)
IMM GRANULOCYTES # BLD AUTO: 0.03 K/UL (ref 0–0.04)
IMM GRANULOCYTES # BLD AUTO: 0.04 K/UL (ref 0–0.04)
IMM GRANULOCYTES # BLD AUTO: ABNORMAL K/UL (ref 0–0.04)
IMM GRANULOCYTES NFR BLD AUTO: 0.2 % (ref 0–0.5)
IMM GRANULOCYTES NFR BLD AUTO: 0.4 % (ref 0–0.5)
IMM GRANULOCYTES NFR BLD AUTO: 0.6 % (ref 0–0.5)
IMM GRANULOCYTES NFR BLD AUTO: ABNORMAL % (ref 0–0.5)
IMP GENE (CARBAPENEM RESISTANT): NOT DETECTED
KLEBSIELLA AEROGENES: DETECTED
KLEBSIELLA OXYTOCA: NOT DETECTED
KLEBSIELLA PNEUMONIAE GROUP: NOT DETECTED
KPC RESISTANCE GENE (CARBAPENEM): NOT DETECTED
LISTERIA MONOCYTOGENES: NOT DETECTED
LYMPHOCYTES # BLD AUTO: 0.5 K/UL (ref 1–4.8)
LYMPHOCYTES # BLD AUTO: 0.6 K/UL (ref 1–4.8)
LYMPHOCYTES # BLD AUTO: 0.7 K/UL (ref 1–4.8)
LYMPHOCYTES # BLD AUTO: ABNORMAL K/UL (ref 1–4.8)
LYMPHOCYTES NFR BLD: 6.1 % (ref 18–48)
LYMPHOCYTES NFR BLD: 7 % (ref 18–48)
LYMPHOCYTES NFR BLD: 7.4 % (ref 18–48)
LYMPHOCYTES NFR BLD: 8.7 % (ref 18–48)
LYMPHOCYTES NFR BLD: 9.4 % (ref 18–48)
LYMPHOCYTES NFR BLD: ABNORMAL % (ref 18–48)
MAGNESIUM SERPL-MCNC: 2.1 MG/DL (ref 1.6–2.6)
MAGNESIUM SERPL-MCNC: 2.1 MG/DL (ref 1.6–2.6)
MAGNESIUM SERPL-MCNC: 2.3 MG/DL (ref 1.6–2.6)
MCH RBC QN AUTO: 28.1 PG (ref 27–31)
MCH RBC QN AUTO: 28.2 PG (ref 27–31)
MCH RBC QN AUTO: 28.3 PG (ref 27–31)
MCH RBC QN AUTO: 28.3 PG (ref 27–31)
MCH RBC QN AUTO: 28.6 PG (ref 27–31)
MCH RBC QN AUTO: ABNORMAL PG (ref 27–31)
MCHC RBC AUTO-ENTMCNC: 31 G/DL (ref 32–36)
MCHC RBC AUTO-ENTMCNC: 31.1 G/DL (ref 32–36)
MCHC RBC AUTO-ENTMCNC: 31.3 G/DL (ref 32–36)
MCHC RBC AUTO-ENTMCNC: 31.5 G/DL (ref 32–36)
MCHC RBC AUTO-ENTMCNC: 31.5 G/DL (ref 32–36)
MCHC RBC AUTO-ENTMCNC: ABNORMAL G/DL (ref 32–36)
MCR-1: NOT DETECTED
MCV RBC AUTO: 89 FL (ref 82–98)
MCV RBC AUTO: 90 FL (ref 82–98)
MCV RBC AUTO: 91 FL (ref 82–98)
MCV RBC AUTO: 91 FL (ref 82–98)
MCV RBC AUTO: 92 FL (ref 82–98)
MCV RBC AUTO: ABNORMAL FL (ref 82–98)
MEC A/C AND MREJ (MRSA): ABNORMAL
MEC A/C: ABNORMAL
MEGAKARYOCYTIC FRAGMENTS: ABNORMAL
METAMYELOCYTES NFR BLD MANUAL: ABNORMAL %
MONOCYTES # BLD AUTO: 0.7 K/UL (ref 0.3–1)
MONOCYTES # BLD AUTO: 0.8 K/UL (ref 0.3–1)
MONOCYTES # BLD AUTO: 0.9 K/UL (ref 0.3–1)
MONOCYTES # BLD AUTO: 0.9 K/UL (ref 0.3–1)
MONOCYTES # BLD AUTO: 1 K/UL (ref 0.3–1)
MONOCYTES # BLD AUTO: ABNORMAL K/UL (ref 0.3–1)
MONOCYTES NFR BLD: 10.9 % (ref 4–15)
MONOCYTES NFR BLD: 11.2 % (ref 4–15)
MONOCYTES NFR BLD: 11.2 % (ref 4–15)
MONOCYTES NFR BLD: 11.4 % (ref 4–15)
MONOCYTES NFR BLD: 11.9 % (ref 4–15)
MONOCYTES NFR BLD: ABNORMAL % (ref 4–15)
MRSA SCREEN BY PCR: NOT DETECTED
MYELOCYTES NFR BLD MANUAL: ABNORMAL %
NDM GENE (CARBAPENEM RESISTANT): NOT DETECTED
NEISSERIA MENINGITIDIS: NOT DETECTED
NEUTROPHILS # BLD AUTO: 5.4 K/UL (ref 1.8–7.7)
NEUTROPHILS # BLD AUTO: 5.4 K/UL (ref 1.8–7.7)
NEUTROPHILS # BLD AUTO: 6.2 K/UL (ref 1.8–7.7)
NEUTROPHILS # BLD AUTO: 6.6 K/UL (ref 1.8–7.7)
NEUTROPHILS # BLD AUTO: 6.7 K/UL (ref 1.8–7.7)
NEUTROPHILS # BLD AUTO: ABNORMAL K/UL (ref 1.8–7.7)
NEUTROPHILS NFR BLD: 78 % (ref 38–73)
NEUTROPHILS NFR BLD: 79.4 % (ref 38–73)
NEUTROPHILS NFR BLD: 80.6 % (ref 38–73)
NEUTROPHILS NFR BLD: 81 % (ref 38–73)
NEUTROPHILS NFR BLD: 81.2 % (ref 38–73)
NEUTROPHILS NFR BLD: ABNORMAL % (ref 38–73)
NEUTS BAND NFR BLD MANUAL: ABNORMAL %
NRBC BLD-RTO: 0 /100 WBC
NRBC BLD-RTO: ABNORMAL /100 WBC
OVALOCYTES BLD QL SMEAR: ABNORMAL
OXA-48-LIKE (CARBAPENEM RESISTANT): NOT DETECTED
PAPPENHEIMER BOD BLD QL SMEAR: ABNORMAL
PCO2 BLDA: 49.9 MMHG (ref 35–45)
PH SMN: 7.31 [PH] (ref 7.35–7.45)
PHOSPHATE SERPL-MCNC: 1.7 MG/DL (ref 2.7–4.5)
PHOSPHATE SERPL-MCNC: 1.9 MG/DL (ref 2.7–4.5)
PHOSPHATE SERPL-MCNC: 2.3 MG/DL (ref 2.7–4.5)
PHOSPHATE SERPL-MCNC: 2.6 MG/DL (ref 2.7–4.5)
PHOSPHATE SERPL-MCNC: 2.7 MG/DL (ref 2.7–4.5)
PLASMODIUM BLD QL SMEAR: ABNORMAL
PLATELET # BLD AUTO: 357 K/UL (ref 150–450)
PLATELET # BLD AUTO: 360 K/UL (ref 150–450)
PLATELET # BLD AUTO: 376 K/UL (ref 150–450)
PLATELET # BLD AUTO: 390 K/UL (ref 150–450)
PLATELET # BLD AUTO: 397 K/UL (ref 150–450)
PLATELET # BLD AUTO: ABNORMAL K/UL (ref 150–450)
PLATELET BLD QL SMEAR: ABNORMAL
PMV BLD AUTO: 9.4 FL (ref 9.2–12.9)
PMV BLD AUTO: 9.4 FL (ref 9.2–12.9)
PMV BLD AUTO: 9.7 FL (ref 9.2–12.9)
PMV BLD AUTO: 9.9 FL (ref 9.2–12.9)
PMV BLD AUTO: 9.9 FL (ref 9.2–12.9)
PMV BLD AUTO: ABNORMAL FL (ref 9.2–12.9)
PO2 BLDA: 25 MMHG (ref 40–60)
POC BE: -1 MMOL/L
POC SATURATED O2: 40 % (ref 95–100)
POC TCO2: 27 MMOL/L (ref 24–29)
POCT GLUCOSE: 134 MG/DL (ref 70–110)
POCT GLUCOSE: 171 MG/DL (ref 70–110)
POCT GLUCOSE: 175 MG/DL (ref 70–110)
POCT GLUCOSE: 195 MG/DL (ref 70–110)
POCT GLUCOSE: 195 MG/DL (ref 70–110)
POCT GLUCOSE: 208 MG/DL (ref 70–110)
POCT GLUCOSE: 209 MG/DL (ref 70–110)
POCT GLUCOSE: 215 MG/DL (ref 70–110)
POCT GLUCOSE: 215 MG/DL (ref 70–110)
POCT GLUCOSE: 217 MG/DL (ref 70–110)
POCT GLUCOSE: 218 MG/DL (ref 70–110)
POCT GLUCOSE: 219 MG/DL (ref 70–110)
POCT GLUCOSE: 221 MG/DL (ref 70–110)
POCT GLUCOSE: 228 MG/DL (ref 70–110)
POCT GLUCOSE: 229 MG/DL (ref 70–110)
POCT GLUCOSE: 241 MG/DL (ref 70–110)
POCT GLUCOSE: 242 MG/DL (ref 70–110)
POCT GLUCOSE: 243 MG/DL (ref 70–110)
POCT GLUCOSE: 247 MG/DL (ref 70–110)
POCT GLUCOSE: 248 MG/DL (ref 70–110)
POCT GLUCOSE: 346 MG/DL (ref 70–110)
POIKILOCYTOSIS BLD QL SMEAR: ABNORMAL
POLYCHROMASIA BLD QL SMEAR: ABNORMAL
POTASSIUM SERPL-SCNC: 3.6 MMOL/L (ref 3.5–5.1)
POTASSIUM SERPL-SCNC: 3.7 MMOL/L (ref 3.5–5.1)
POTASSIUM SERPL-SCNC: 3.8 MMOL/L (ref 3.5–5.1)
POTASSIUM SERPL-SCNC: 4 MMOL/L (ref 3.5–5.1)
POTASSIUM SERPL-SCNC: 4.3 MMOL/L (ref 3.5–5.1)
POTASSIUM SERPL-SCNC: 4.7 MMOL/L (ref 3.5–5.1)
PROMYELOCYTES NFR BLD MANUAL: ABNORMAL %
PROT SERPL-MCNC: 8.1 G/DL (ref 6–8.4)
PROTEUS SPECIES: NOT DETECTED
PSEUDOMONAS AERUGINOSA: NOT DETECTED
RBC # BLD AUTO: 3.6 M/UL (ref 4.6–6.2)
RBC # BLD AUTO: 3.78 M/UL (ref 4.6–6.2)
RBC # BLD AUTO: 3.81 M/UL (ref 4.6–6.2)
RBC # BLD AUTO: 3.93 M/UL (ref 4.6–6.2)
RBC # BLD AUTO: 3.98 M/UL (ref 4.6–6.2)
RBC # BLD AUTO: ABNORMAL M/UL (ref 4.6–6.2)
RBC AGGLUT BLD QL: ABNORMAL
ROULEAUX BLD QL SMEAR: ABNORMAL
SALMONELLA SP: NOT DETECTED
SAMPLE: ABNORMAL
SCHISTOCYTES BLD QL SMEAR: ABNORMAL
SCHISTOCYTES BLD QL SMEAR: ABNORMAL
SERRATIA MARCESCENS: NOT DETECTED
SICKLE CELLS BLD QL SMEAR: ABNORMAL
SITE: ABNORMAL
SMUDGE CELLS BLD QL SMEAR: ABNORMAL
SODIUM SERPL-SCNC: 147 MMOL/L (ref 136–145)
SODIUM SERPL-SCNC: 148 MMOL/L (ref 136–145)
SODIUM SERPL-SCNC: 149 MMOL/L (ref 136–145)
SODIUM SERPL-SCNC: 150 MMOL/L (ref 136–145)
SPHEROCYTES BLD QL SMEAR: ABNORMAL
STAPHYLOCOCCUS AUREUS: NOT DETECTED
STAPHYLOCOCCUS EPIDERMIDIS: NOT DETECTED
STAPHYLOCOCCUS LUGDUNESIS: NOT DETECTED
STAPHYLOCOCCUS SPECIES: NOT DETECTED
STENOTROPHOMONAS MALTOPHILIA: NOT DETECTED
STOMATOCYTES BLD QL SMEAR: ABNORMAL
STREPTOCOCCUS AGALACTIAE: NOT DETECTED
STREPTOCOCCUS PNEUMONIAE: NOT DETECTED
STREPTOCOCCUS PYOGENES: NOT DETECTED
STREPTOCOCCUS SPECIES: NOT DETECTED
TARGETS BLD QL SMEAR: ABNORMAL
TOXIC GRANULES BLD QL SMEAR: ABNORMAL
VAN A/B (VRE GENE): ABNORMAL
VANCOMYCIN SERPL-MCNC: 7.8 UG/ML
VANCOMYCIN SERPL-MCNC: NORMAL UG/ML
VIM GENE (CARBAPENEM RESISTANT): NOT DETECTED
WBC # BLD AUTO: 6.81 K/UL (ref 3.9–12.7)
WBC # BLD AUTO: 6.89 K/UL (ref 3.9–12.7)
WBC # BLD AUTO: 7.71 K/UL (ref 3.9–12.7)
WBC # BLD AUTO: 8.17 K/UL (ref 3.9–12.7)
WBC # BLD AUTO: 8.25 K/UL (ref 3.9–12.7)
WBC # BLD AUTO: ABNORMAL K/UL (ref 3.9–12.7)
WBC NRBC COR # BLD: ABNORMAL K/UL (ref 3.9–12.7)
WBC OTHER NFR BLD MANUAL: ABNORMAL %
WBC TOXIC VACUOLES BLD QL SMEAR: ABNORMAL

## 2024-10-17 PROCEDURE — 87641 MR-STAPH DNA AMP PROBE: CPT | Mod: HCNC | Performed by: INTERNAL MEDICINE

## 2024-10-17 PROCEDURE — 25000003 PHARM REV CODE 250: Mod: HCNC

## 2024-10-17 PROCEDURE — 99291 CRITICAL CARE FIRST HOUR: CPT | Mod: HCNC,GC,, | Performed by: INTERNAL MEDICINE

## 2024-10-17 PROCEDURE — 80202 ASSAY OF VANCOMYCIN: CPT | Mod: HCNC | Performed by: INTERNAL MEDICINE

## 2024-10-17 PROCEDURE — 82330 ASSAY OF CALCIUM: CPT | Mod: 91,HCNC | Performed by: STUDENT IN AN ORGANIZED HEALTH CARE EDUCATION/TRAINING PROGRAM

## 2024-10-17 PROCEDURE — 25000003 PHARM REV CODE 250: Mod: HCNC | Performed by: STUDENT IN AN ORGANIZED HEALTH CARE EDUCATION/TRAINING PROGRAM

## 2024-10-17 PROCEDURE — 85025 COMPLETE CBC W/AUTO DIFF WBC: CPT | Mod: 91,HCNC

## 2024-10-17 PROCEDURE — 80048 BASIC METABOLIC PNL TOTAL CA: CPT | Mod: HCNC,XB | Performed by: NURSE PRACTITIONER

## 2024-10-17 PROCEDURE — 99223 1ST HOSP IP/OBS HIGH 75: CPT | Mod: HCNC,,, | Performed by: PODIATRIST

## 2024-10-17 PROCEDURE — 82330 ASSAY OF CALCIUM: CPT | Mod: 91,HCNC | Performed by: INTERNAL MEDICINE

## 2024-10-17 PROCEDURE — 85025 COMPLETE CBC W/AUTO DIFF WBC: CPT | Mod: HCNC | Performed by: STUDENT IN AN ORGANIZED HEALTH CARE EDUCATION/TRAINING PROGRAM

## 2024-10-17 PROCEDURE — 99900035 HC TECH TIME PER 15 MIN (STAT): Mod: HCNC

## 2024-10-17 PROCEDURE — 63600175 PHARM REV CODE 636 W HCPCS: Mod: HCNC

## 2024-10-17 PROCEDURE — 82330 ASSAY OF CALCIUM: CPT | Mod: HCNC | Performed by: STUDENT IN AN ORGANIZED HEALTH CARE EDUCATION/TRAINING PROGRAM

## 2024-10-17 PROCEDURE — 84100 ASSAY OF PHOSPHORUS: CPT | Mod: 91,HCNC | Performed by: INTERNAL MEDICINE

## 2024-10-17 PROCEDURE — 84100 ASSAY OF PHOSPHORUS: CPT | Mod: HCNC | Performed by: NURSE PRACTITIONER

## 2024-10-17 PROCEDURE — 80048 BASIC METABOLIC PNL TOTAL CA: CPT | Mod: 91,HCNC,XB | Performed by: NURSE PRACTITIONER

## 2024-10-17 PROCEDURE — 20000000 HC ICU ROOM: Mod: HCNC

## 2024-10-17 PROCEDURE — S5010 5% DEXTROSE AND 0.45% SALINE: HCPCS | Mod: HCNC

## 2024-10-17 PROCEDURE — 83735 ASSAY OF MAGNESIUM: CPT | Mod: 91,HCNC | Performed by: NURSE PRACTITIONER

## 2024-10-17 PROCEDURE — 63600175 PHARM REV CODE 636 W HCPCS: Mod: HCNC | Performed by: INTERNAL MEDICINE

## 2024-10-17 PROCEDURE — 25000003 PHARM REV CODE 250: Mod: HCNC | Performed by: NURSE PRACTITIONER

## 2024-10-17 PROCEDURE — 80048 BASIC METABOLIC PNL TOTAL CA: CPT | Mod: 91,HCNC,XB | Performed by: INTERNAL MEDICINE

## 2024-10-17 PROCEDURE — 84100 ASSAY OF PHOSPHORUS: CPT | Mod: 91,HCNC | Performed by: NURSE PRACTITIONER

## 2024-10-17 PROCEDURE — 83735 ASSAY OF MAGNESIUM: CPT | Mod: HCNC | Performed by: NURSE PRACTITIONER

## 2024-10-17 PROCEDURE — 63600175 PHARM REV CODE 636 W HCPCS: Mod: JA,HCNC | Performed by: STUDENT IN AN ORGANIZED HEALTH CARE EDUCATION/TRAINING PROGRAM

## 2024-10-17 PROCEDURE — 94761 N-INVAS EAR/PLS OXIMETRY MLT: CPT | Mod: HCNC,XB

## 2024-10-17 PROCEDURE — 83735 ASSAY OF MAGNESIUM: CPT | Mod: 91,HCNC | Performed by: INTERNAL MEDICINE

## 2024-10-17 PROCEDURE — 82800 BLOOD PH: CPT | Mod: HCNC

## 2024-10-17 PROCEDURE — 82803 BLOOD GASES ANY COMBINATION: CPT | Mod: HCNC

## 2024-10-17 PROCEDURE — 25000003 PHARM REV CODE 250: Mod: HCNC | Performed by: INTERNAL MEDICINE

## 2024-10-17 PROCEDURE — S5010 5% DEXTROSE AND 0.45% SALINE: HCPCS | Mod: HCNC | Performed by: INTERNAL MEDICINE

## 2024-10-17 PROCEDURE — 80053 COMPREHEN METABOLIC PANEL: CPT | Mod: HCNC | Performed by: INTERNAL MEDICINE

## 2024-10-17 PROCEDURE — 85025 COMPLETE CBC W/AUTO DIFF WBC: CPT | Mod: 91,HCNC | Performed by: INTERNAL MEDICINE

## 2024-10-17 RX ORDER — POTASSIUM CHLORIDE 7.45 MG/ML
10 INJECTION INTRAVENOUS
Status: COMPLETED | OUTPATIENT
Start: 2024-10-17 | End: 2024-10-17

## 2024-10-17 RX ORDER — INSULIN GLARGINE 100 [IU]/ML
26 INJECTION, SOLUTION SUBCUTANEOUS NIGHTLY
Status: DISCONTINUED | OUTPATIENT
Start: 2024-10-17 | End: 2024-10-22 | Stop reason: HOSPADM

## 2024-10-17 RX ORDER — CEFEPIME HYDROCHLORIDE 2 G/1
2 INJECTION, POWDER, FOR SOLUTION INTRAVENOUS
Status: DISCONTINUED | OUTPATIENT
Start: 2024-10-17 | End: 2024-10-18

## 2024-10-17 RX ORDER — HEPARIN SODIUM 5000 [USP'U]/ML
5000 INJECTION, SOLUTION INTRAVENOUS; SUBCUTANEOUS EVERY 8 HOURS
Status: DISCONTINUED | OUTPATIENT
Start: 2024-10-17 | End: 2024-10-22 | Stop reason: HOSPADM

## 2024-10-17 RX ORDER — DIPHENHYDRAMINE HYDROCHLORIDE 50 MG/ML
25 INJECTION INTRAMUSCULAR; INTRAVENOUS ONCE
Status: COMPLETED | OUTPATIENT
Start: 2024-10-17 | End: 2024-10-17

## 2024-10-17 RX ORDER — METOCLOPRAMIDE HYDROCHLORIDE 5 MG/ML
10 INJECTION INTRAMUSCULAR; INTRAVENOUS ONCE
Status: COMPLETED | OUTPATIENT
Start: 2024-10-17 | End: 2024-10-17

## 2024-10-17 RX ORDER — DEXTROSE MONOHYDRATE AND SODIUM CHLORIDE 5; .45 G/100ML; G/100ML
INJECTION, SOLUTION INTRAVENOUS CONTINUOUS
Status: DISCONTINUED | OUTPATIENT
Start: 2024-10-17 | End: 2024-10-18

## 2024-10-17 RX ADMIN — PIPERACILLIN SODIUM AND TAZOBACTAM SODIUM 4.5 G: 4; .5 INJECTION, POWDER, FOR SOLUTION INTRAVENOUS at 03:10

## 2024-10-17 RX ADMIN — PANTOPRAZOLE SODIUM 40 MG: 40 INJECTION, POWDER, LYOPHILIZED, FOR SOLUTION INTRAVENOUS at 08:10

## 2024-10-17 RX ADMIN — VANCOMYCIN HYDROCHLORIDE 1250 MG: 1.25 INJECTION, POWDER, LYOPHILIZED, FOR SOLUTION INTRAVENOUS at 03:10

## 2024-10-17 RX ADMIN — INSULIN GLARGINE 26 UNITS: 100 INJECTION, SOLUTION SUBCUTANEOUS at 06:10

## 2024-10-17 RX ADMIN — DIPHENHYDRAMINE HYDROCHLORIDE 25 MG: 50 INJECTION, SOLUTION INTRAMUSCULAR; INTRAVENOUS at 10:10

## 2024-10-17 RX ADMIN — PIPERACILLIN SODIUM AND TAZOBACTAM SODIUM 4.5 G: 4; .5 INJECTION, POWDER, FOR SOLUTION INTRAVENOUS at 09:10

## 2024-10-17 RX ADMIN — CEFEPIME 2 G: 2 INJECTION, POWDER, FOR SOLUTION INTRAVENOUS at 12:10

## 2024-10-17 RX ADMIN — DEXTROSE AND SODIUM CHLORIDE: 5; 450 INJECTION, SOLUTION INTRAVENOUS at 06:10

## 2024-10-17 RX ADMIN — POTASSIUM CHLORIDE 10 MEQ: 7.46 INJECTION, SOLUTION INTRAVENOUS at 07:10

## 2024-10-17 RX ADMIN — DEXTROSE AND SODIUM CHLORIDE: 5; 450 INJECTION, SOLUTION INTRAVENOUS at 08:10

## 2024-10-17 RX ADMIN — POTASSIUM CHLORIDE 10 MEQ: 7.46 INJECTION, SOLUTION INTRAVENOUS at 09:10

## 2024-10-17 RX ADMIN — POTASSIUM CHLORIDE 10 MEQ: 7.46 INJECTION, SOLUTION INTRAVENOUS at 08:10

## 2024-10-17 RX ADMIN — INSULIN HUMAN 0.01 UNITS/KG/HR: 1 INJECTION, SOLUTION INTRAVENOUS at 04:10

## 2024-10-17 RX ADMIN — ONDANSETRON 4 MG: 2 INJECTION INTRAMUSCULAR; INTRAVENOUS at 01:10

## 2024-10-17 RX ADMIN — OCTREOTIDE ACETATE 50 MCG/HR: 500 INJECTION, SOLUTION INTRAVENOUS; SUBCUTANEOUS at 07:10

## 2024-10-17 RX ADMIN — INSULIN GLARGINE 26 UNITS: 100 INJECTION, SOLUTION SUBCUTANEOUS at 08:10

## 2024-10-17 RX ADMIN — POTASSIUM CHLORIDE 10 MEQ: 7.46 INJECTION, SOLUTION INTRAVENOUS at 10:10

## 2024-10-17 RX ADMIN — POTASSIUM PHOSPHATE, MONOBASIC AND POTASSIUM PHOSPHATE, DIBASIC 15 MMOL: 224; 236 INJECTION, SOLUTION, CONCENTRATE INTRAVENOUS at 06:10

## 2024-10-17 RX ADMIN — HEPARIN SODIUM 5000 UNITS: 5000 INJECTION INTRAVENOUS; SUBCUTANEOUS at 09:10

## 2024-10-17 RX ADMIN — METOCLOPRAMIDE 10 MG: 5 INJECTION, SOLUTION INTRAMUSCULAR; INTRAVENOUS at 10:10

## 2024-10-17 NOTE — HPI
54M with a PMHx of T2D w/ neuropathy, HLD, colon adenocarcinoma, OM of R foot who was admitted for hematemesis and DKA.  Podiatry was consulted for plantar L foot wound with purulence.  Patient is a poor historian.  When asked to wake up patient would not listen and return to sleep.  Patient follows with Dr. Peterson in Podiatry clinic.  Last seen on 10/11 were bone cultures of L foot obtained and is speciating pseudomonas.  Patient denies any pain to his L foot.  Denies fevers, chills, nausea.  Denies SOB or chest pain.  Denies any other pedal complaints. Patient unable to answer any other medical questions.    Tachycardic, afebrile, WBC WNL, A1C 11.  XR L foot shows acute OM to 2rd met base, cuboid, and lateral cuneiform.  Bone cx from 10/11 speciating pseudomonas

## 2024-10-17 NOTE — ASSESSMENT & PLAN NOTE
Creatinine 3.1 on admit, baseline around 1.0  - Likely pre-renal from dehydration and volume losses/DKA/GI bleed    Plan:   Lab Results   Component Value Date    CREATININE 2.4 (H) 10/17/2024     - Check urine lytes and renal ultrasound if no improvement with adequate resuscitation   - Strict I&Os and daily weights   - Gentle IVF  - Avoid nephrotoxic agents such as NSAIDs, gadolinium and IV radiocontrast.  - Renally dose meds to current GFR.  - Maintain MAP > 65.

## 2024-10-17 NOTE — ASSESSMENT & PLAN NOTE
"This patient does have evidence of infective focus  My overall impression is sepsis.  Source: Skin and Soft Tissue (location left foot wound)  Antibiotics given-   Antibiotics (72h ago, onward)      Start     Stop Route Frequency Ordered    10/17/24 1500  vancomycin 1,250 mg in D5W 250 mL IVPB (admixture device)         -- IV Once 10/17/24 1357    10/17/24 1300  ceFEPIme injection 2 g         -- IV Every 12 hours (non-standard times) 10/17/24 1235    10/16/24 1745  vancomycin - pharmacy to dose  (vancomycin IVPB (PEDS and ADULTS))        Placed in "And" Linked Group    -- IV pharmacy to manage frequency 10/16/24 1645          Latest lactate reviewed-  Recent Labs   Lab 10/16/24  1128   POCLAC 1.92     Organ dysfunction indicated by Acute kidney injury and Encephalopathy    - Patient not currently requiring pressor support  - Blood cultures with GNRs, rapid ID with Klebsiella. F/u remaining speciation and sensitivity   - Podiatry consulted regarding OM  - Wound care consulted  - Continue vanc and cefepime (Klebsiella with inducible AmpC resistance)  - F/u repeat blood cultures  - If patient has worsening mentation, consider head CT  "

## 2024-10-17 NOTE — CONSULTS
[facility] - Medical ICU  Podiatry  Consult Note    Patient Name: Indio Ryder Jr.  MRN: [id number]  Admission Date: 10/16/2024  Hospital Length of Stay: 1 days  Attending Physician: Henry Snyder*  Primary Care Provider: Lorena Thakur MD     Inpatient consult to Podiatry  Consult performed by: Ida Rudd DPM  Consult ordered by: Toshia Maldonado DO  Reason for consult: see below        Subjective:     History of Present Illness:  54M with a PMHx of T2D w/ neuropathy, HLD, colon adenocarcinoma, OM of R foot who was admitted for hematemesis and DKA.  Podiatry was consulted for plantar L foot wound with purulence.  Patient is a poor historian.  When asked to wake up patient would not listen and return to sleep.  Patient follows with Dr. Peterson in Podiatry clinic.  Last seen on 10/11 were bone cultures of L foot obtained and is speciating pseudomonas.  Patient denies any pain to his L foot.  Denies fevers, chills, nausea.  Denies SOB or chest pain.  Denies any other pedal complaints. Patient unable to answer any other medical questions.    Tachycardic, afebrile, WBC WNL, A1C 11.  XR L foot shows acute OM to 2rd met base, cuboid, and lateral cuneiform.  Bone cx from 10/11 speciating pseudomonas    Scheduled Meds:   insulin glargine U-100 (Lantus)  26 Units Subcutaneous Nightly    pantoprazole  40 mg Intravenous BID    piperacillin-tazobactam (Zosyn) IV (PEDS and ADULTS) (extended infusion is not appropriate)  4.5 g Intravenous Q8H     Continuous Infusions:   0/9% NACL & POTASSIUM CHLORIDE 20 MEQ/L   Intravenous Continuous   Held at 10/16/24 1945    D5 and 0.45% NaCl   Intravenous Continuous  mL/hr at 10/17/24 0733 Rate Verify at 10/17/24 0733    D5 and 0.45% NaCl   Intravenous Continuous        insulin regular 1 units/mL infusion orderable (DKA)  0-0.2 Units/kg/hr Intravenous Continuous 1 mL/hr at 10/17/24 0733 0.01 Units/kg/hr at 10/17/24 0733    octreotide (SANDOSTATIN) 500 mcg in 0.9% NaCl  100 mL infusion  50 mcg/hr Intravenous Continuous 10 mL/hr at 10/17/24 0737 50 mcg/hr at 10/17/24 0737     PRN Meds:  Current Facility-Administered Medications:     0.9%  NaCl infusion (for blood administration), , Intravenous, Q24H PRN    dextrose 10%, 12.5 g, Intravenous, PRN    dextrose 10%, 25 g, Intravenous, PRN    D5 and 0.45% NaCl, , Intravenous, Continuous PRN    ondansetron, 4 mg, Intravenous, Q6H PRN    sodium chloride 0.9%, 10 mL, Intravenous, PRN    Pharmacy to dose Vancomycin consult, , , Once **AND** vancomycin - pharmacy to dose, , Intravenous, pharmacy to manage frequency    Review of patient's allergies indicates:  No Known Allergies     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    Right foot    Colon adenocarcinoma 04/12/2022    Diabetic ketoacidosis without coma associated with type 2 diabetes mellitus 05/30/2017    Diabetic ulcer of right foot associated with type 2 diabetes mellitus 06/03/2015    Disorder of kidney and ureter     Essential hypertension 06/06/2013    Group B streptococcal infection 01/13/2017    Mixed hyperlipidemia 08/12/2014    Septic arthritis of left foot 04/28/2021    Shoulder impingement 08/12/2014    Subacute osteomyelitis of right foot 01/12/2017    Streptococcus agalactiae, Actinomyces odontolyticus    Traumatic amputation of fifth toe of right foot 07/02/2015    Type 2 diabetes mellitus with diabetic neuropathy, with long-term current use of insulin 05/03/2016     Past Surgical History:   Procedure Laterality Date    COLONOSCOPY Right 1/19/2022    Procedure: COLONOSCOPY;  Surgeon: Tj Haile MD;  Location: [facility] (4TH FLR);  Service: Endoscopy;  Laterality: Right;  previous order un-usable/poor prep 1/18/22  RAPID COVID test arrival 12:20  instructions handed to pt- sm    COLONOSCOPY N/A 3/21/2022    Procedure: COLONOSCOPY;  Surgeon: Sheng Haque MD;  Location: [facility] (2ND FLR);  Service: Endoscopy;  Laterality: N/A;   Colonoscopy with AES for hepatix flexure polyp removal, 2nd floor  Pt is fully vaccinated-DS  Pt prescribed Plavix by Dr. Stahl in Jan. 2022, however pt states that he never started taking it-DS  3/16/22-Instructions sent via portal-DS    COLONOSCOPY N/A 9/13/2023    Procedure: COLONOSCOPY;  Surgeon: Erik Stout MD;  Location: [facility] (4TH FLR);  Service: Colon and Rectal;  Laterality: N/A;  Referred by Dr. Stout, timur, [facility], portal -ml    DEBRIDEMENT OF FOOT Left 7/14/2019    Procedure: DEBRIDEMENT, FOOT, with left 5th ray amputation;  Surgeon: Sis Hickman DPM;  Location: [facility] OR 2ND FLR;  Service: Podiatry;  Laterality: Left;    DEBRIDEMENT OF FOOT Left 7/17/2019    Procedure: DEBRIDEMENT, FOOT with leftt 5th ray partial amputation with Neox Graft;  Surgeon: Mai Burrell DPM;  Location: [facility] OR 2ND FLR;  Service: Podiatry;  Laterality: Left;  t    DEBRIDEMENT OF FOOT Bilateral 4/29/2021    Procedure: DEBRIDEMENT, FOOT;  Surgeon: Mai Burrell DPM;  Location: [facility] OR 1ST FLR;  Service: Podiatry;  Laterality: Bilateral;  Graft application    DEBRIDEMENT OF FOOT Left 7/31/2023    Procedure: DEBRIDEMENT, FOOT;  Surgeon: Marivel Peterson DPM;  Location: [facility] OR 2ND FLR;  Service: Podiatry;  Laterality: Left;    TOE AMPUTATION Right 06/05/2015    TOE AMPUTATION Right 01/19/2017    XI ROBOTIC COLECTOMY, RIGHT N/A 4/25/2022    Procedure: XI ROBOTIC COLECTOMY, RIGHT (lithotomy, eras low);  Surgeon: Erik Stout MD;  Location: [facility] OR 2ND FLR;  Service: Colon and Rectal;  Laterality: N/A;  Paruch to Goodwin    XI ROBOTIC COLECTOMY, RIGHT  4/25/2022    Procedure: ;  Surgeon: Erik Stout MD;  Location: [facility] OR 2ND FLR;  Service: Colon and Rectal;;       Family History       Problem Relation (Age of Onset)    Cancer Mother    Cataracts Father    Diabetes Mother, Sister    Heart disease Father, Sister    Hypertension Mother, Father, Maternal Aunt    No Known Problems  Brother, Sister, Maternal Uncle, Paternal Aunt, Paternal Uncle, Maternal Grandmother, Maternal Grandfather, Paternal Grandmother, Paternal Grandfather          Tobacco Use    Smoking status: Never    Smokeless tobacco: Never   Substance and Sexual Activity    Alcohol use: No    Drug use: No    Sexual activity: Yes     Partners: Female     Birth control/protection: Condom     Review of Systems   Constitutional:  Negative for fatigue and fever.   HENT: Negative.     Eyes: Negative.    Respiratory: Negative.     Cardiovascular: Negative.    Gastrointestinal: Negative.    Endocrine: Negative.    Genitourinary: Negative.    Musculoskeletal:  Positive for gait problem.   Skin:  Positive for color change and wound.        Reports wounds to his L foot   Allergic/Immunologic: Negative.    Hematological: Negative.    Psychiatric/Behavioral: Negative.       Objective:     Vital Signs (Most Recent):  Temp: 97.9 °F (36.6 °C) (10/17/24 0701)  Pulse: (!) 120 (10/17/24 0701)  Resp: (!) 31 (10/17/24 0701)  BP: (!) 148/72 (10/17/24 0701)  SpO2: 97 % (10/17/24 0701) Vital Signs (24h Range):  Temp:  [93 °F (33.9 °C)-98.2 °F (36.8 °C)] 97.9 °F (36.6 °C)  Pulse:  [100-121] 120  Resp:  [8-31] 31  SpO2:  [94 %-100 %] 97 %  BP: (102-161)/(53-98) 148/72     Weight: 97.5 kg (214 lb 15.2 oz)  Body mass index is 28.36 kg/m².    Physical Exam    Constitutional:       Appearance: He is well-developed.     Cardiovascular:      Comments: Dorsalis pedis and posterior tibial pulses are palpable Skin is atrophic, slightly hyperpigmented, and mildly edematous     Musculoskeletal:         General: No tenderness. Normal range of motion.      Comments: Muscle strength is 5/5 in all groups bilaterally.     S/p partial 5th ray amp b/l  S/p hallux amp right  Fat pad atrophy to heels and met heads bilateral     Skin:     General: Skin is warm and dry.      Coloration: Skin is not jaundiced, mottled or sallow.      Findings: Wound (see below) present. No  abscess or ecchymosis.      Comments:  2 wounds noted to plantar L foot.  Wound base mixture of granular and necrotic tissue.  Positive probe to bone.  Malodor, crepitus, purulent drainage, erythema, and swelling noted.    Measurment: 6.2 x 3.3 x 0.4 cm (proximal wound), 1.5 x 1.5 x 0.2 cm(distal wound)     Neurological:      Mental Status: He is alert and oriented to person, place, and time.      Comments: [place]en[place] 5.07 monofilamant testing is diminished Kenji feet. Sharp/dull sensation diminished Bilaterally. Light touch absent Bilaterally.     Psychiatric:         Behavior: Behavior normal.     Laboratory:  BMP:   Recent Labs   Lab 10/17/24  0643   *   *   K 4.7   *   CO2 17*   BUN 24*   CREATININE 2.3*   CALCIUM 9.9   MG 2.3     CBC:   Recent Labs   Lab 10/17/24  0643   WBC 7.71   RBC 3.78*   HGB 10.8*   HCT 34.8*      MCV 92   MCH 28.6   MCHC 31.0*     Diagnostic Results:  I have reviewed all pertinent imaging results/findings within the past 24 hours.    Clinical Findings:  L plantar foot pre debridement    L plantar foot post debridement      Assessment/Plan:     ID  Chronic osteomyelitis of left foot  Assesment  Wounds to plantar L foot.  Wound base mixture of granular and necrotic tissue.  Positive probe to bone test.  Wound with purulent drainage, malodor, erythema, swelling, bone exposure, crepitus, and undermining.  No tenderness to palpation.  XR L foot shows acute OM to 3rd met base, cuboid, and lateral cuneiform.  Bone cx 10/11 +pseudomonas. IDSA mild/moderate foot wound infection.    Plan  -Physical exam and imaging findings discussed with the patient.  Discussed with the patient that he has OM to his L foot.  Bone cx that were collected in Dr. Peterson clinic +pseudo.  -L foot wounds debrided.  Full procedure note to follow    Nonviable tissues were debrided beyond the level of bone utilizing a  sterile No. 15 scalpel and forceps. Minimal bleeding controlled with direct  pressure  The patient tolerated this well.   -Podiatry recommends continuing with abx and extensive wound care.  No surgical intervention.  -Abx per Primary  -L foot dressed with Vashe-soaked gauze, abd pad, Kerlix, and ACE wrap  -Wound care orders placed  -WBAT in [facility] boot  -Podiatry will continue to follow    Future Discharge Recommendations  -Patient to follow up in outpatient Podiatry clinic with Dr. Peterson. Podiatry will schedule  -Antibiotics per Primary  -HH/SNF to do dressings 2-3 times a week as follows:  Rinse L foot wound with Vashe and pat dry.  Apply Hydrofera blue, cast padding, and ACE wrap.  -Weight bearing as tolerated in CAM boot  -Keep dressings clean, dry, and intact until follow-up appointment     Thank you for your consult. I will follow-up with patient. Please contact us if you have any additional questions.    Ida Rudd DPM  Podiatry  [facility] - Medical ICU

## 2024-10-17 NOTE — ASSESSMENT & PLAN NOTE
Patient with profound lethargy and encephalopathy on arrival. Appears to be slowly improving. Most likely due to DKA vs sepsis     - If mentation worsens, consider CT head

## 2024-10-17 NOTE — PROGRESS NOTES
RD triggered 2/2 A1C of 11.0.    Pt resting this AM during visit; left paperwork at bedside w/ RD contact information - will follow-up.    Thanks!  MS Miryam, RD, LDN

## 2024-10-17 NOTE — PLAN OF CARE
Problem: Adult Inpatient Plan of Care  Goal: Plan of Care Review  Outcome: Progressing  Goal: Patient-Specific Goal (Individualized)  Outcome: Progressing  Goal: Absence of Hospital-Acquired Illness or Injury  Outcome: Progressing  Goal: Optimal Comfort and Wellbeing  Outcome: Progressing  Goal: Readiness for Transition of Care  Outcome: Progressing     Problem: Infection  Goal: Absence of Infection Signs and Symptoms  Outcome: Progressing     Problem: Diabetes Comorbidity  Goal: Blood Glucose Level Within Targeted Range  Outcome: Progressing     Problem: Acute Kidney Injury/Impairment  Goal: Fluid and Electrolyte Balance  Outcome: Progressing  Goal: Improved Oral Intake  Outcome: Progressing  Goal: Effective Renal Function  Outcome: Progressing     Problem: Wound  Goal: Optimal Coping  Outcome: Progressing  Goal: Optimal Functional Ability  Outcome: Progressing  Goal: Absence of Infection Signs and Symptoms  Outcome: Progressing  Goal: Improved Oral Intake  Outcome: Progressing  Goal: Optimal Pain Control and Function  Outcome: Progressing  Goal: Skin Health and Integrity  Outcome: Progressing  Goal: Optimal Wound Healing  Outcome: Progressing     Problem: Sepsis/Septic Shock  Goal: Optimal Coping  Outcome: Progressing  Goal: Absence of Bleeding  Outcome: Progressing  Goal: Blood Glucose Level Within Targeted Range  Outcome: Progressing  Goal: Absence of Infection Signs and Symptoms  Outcome: Progressing  Goal: Optimal Nutrition Intake  Outcome: Progressing  MICU DAILY GOALS     Family/Goals of care/Code Status   Code Status: Full Code    24H Vital Sign Range  Temp:  [95.2 °F (35.1 °C)-98.2 °F (36.8 °C)]   Pulse:  []   Resp:  [8-31]   BP: (102-176)/()   SpO2:  [94 %-100 %]      Shift Events (include procedures and significant events)   No acute events throughout shift    AWAKE RASS: Goal - RASS Goal: 0-->alert and calm  Actual - RASS (Deshpande Agitation-Sedation Scale): alert and calm    Restraint  necessity: Not necessary   BREATHE SBT: Not intubated    Coordinate A & B, analgesics/sedatives Pain: managed   SAT: Not intubated   Delirium CAM-ICU: Overall CAM-ICU: Negative   Early(intubated/ Progressive (non-intubated) Mobility MOVE Screen (INTUBATED ONLY): Not intubated    Activity: Activity Management: Rolling - L1   Feeding/Nutrition Diet order: Diet/Nutrition Received: NPO,     Thrombus DVT prophylaxis: VTE Core Measure: Pharmacological prophylaxis initiated/maintained   HOB Elevation Head of Bed (HOB) Positioning: HOB elevated   Ulcer Prophylaxis GI: yes   Glucose control managed Glycemic Management: blood glucose monitored   Skin Skin assessment:     Sacrum intact/not altered? Yes  Heels intact/not altered? No  Surgical wound? No    CHECK ONE!   (no altered skin or altered skin) and sub boxes:  [] No Altered Skin Integrity Present    []Prevention Measures Documented    [] Altered Skin Integrity Present or Discovered   [] LDA present in EPIC, daily doc completed              [] LDA added if not in EPIC (describe wound).                    When describing wound, do not stage, use descriptive words only.    [] Wound Image Taken (required on admit,                   transfer/discharge and every Tuesday)    Wound Care Consulted? Yes   Bowel Function no issues    Indwelling Catheter Necessity      Urethral Catheter 10/16/24 1312 Temperature probe 16 Fr.-Reason for Continuing Urinary Catheterization: Critically ill in ICU and requiring hourly monitoring of intake/output          De-escalation Antibiotics Yes        VS and assessment per flow sheet, patient progressing towards goals as tolerated, plan of care reviewed with [unfilled] and family, all concerns addressed, will continue to monitor.

## 2024-10-17 NOTE — PLAN OF CARE
[facility]  Initial Discharge Assessment       Primary Care Provider: Lorena Thakur MD    Admission Diagnosis: Tachycardia [R00.0]  Gastrointestinal hemorrhage with hematemesis [K92.0]    Admission Date: 10/16/2024  Expected Discharge Date: 10/21/2024         Payor: HU[employer] MANAGED MEDICARE / Plan: [employer] HMO PPO SPECIAL NEEDS / Product Type: Medicare Advantage /     Extended Emergency Contact Information  Primary Emergency Contact: ChristopherCynthia   [place]  Work Phone: [phone]  Relation: Sister  Secondary Emergency Contact: Amirah Ho  Mobile Phone: [phone]  Relation: Relative    Discharge Plan A: Home Health, Home with family  Discharge Plan B: Home with family      [facility]  [address]  [facility] [address]  Phone: [phone] Fax: [phone]    [facility]  [address] [facility]   [facility] [address]  Phone: [phone] Fax: [phone]      Initial Assessment (most recent)       Adult Discharge Assessment - 10/17/24 1306          Discharge Assessment    Assessment Type Discharge Planning Assessment     Confirmed/corrected address, phone number and insurance Yes     Confirmed Demographics Correct on Facesheet     Source of Information patient     Communicated FRANCISCO with patient/caregiver Yes     People in Home alone     Do you expect to return to your current living situation? Yes     Do you have help at home or someone to help you manage your care at home? Yes     Who are your caregiver(s) and their phone number(s)? sister - Cynthia     Prior to hospitilization cognitive status: Alert/Oriented     Current cognitive status: Alert/Oriented     Home Layout Able to live on 1st floor     Equipment Currently Used at Home none     Do you currently have service(s) that help you manage your care at home? No     Do you take prescription medications? Yes     Do you have prescription coverage? Yes     Do you have  any problems affording any of your prescribed medications? No     Is the patient taking medications as prescribed? yes     How do you get to doctors appointments? car, drives self     Are you on dialysis? No     Do you take coumadin? No     Discharge Plan A Home Health;Home with family     Discharge Plan B Home with family     DME Needed Upon Discharge  none     Discharge Plan discussed with: Patient                   Patient lives alone in a single story home with three entry steps.   Patient sister Cynthia is primary caregiver and will assist in patient care once home.    Discharge Plan A and Plan B have been determined by review of patient's clinical status, future medical and therapeutic needs, and coverage/benefits for post-acute care in coordination with multidisciplinary team members.

## 2024-10-17 NOTE — ASSESSMENT & PLAN NOTE
Assesment  Wounds to plantar L foot.  Wound base mixture of granular and necrotic tissue.  Positive probe to bone test.  Wound with purulent drainage, malodor, erythema, swelling, bone exposure, crepitus, and undermining.  No tenderness to palpation.  XR L foot shows acute OM to 3rd met base, cuboid, and lateral cuneiform.  Bone cx 10/11 +pseudomonas. IDSA mild/moderate foot wound infection.    Plan  -Physical exam and imaging findings discussed with the patient.  Discussed with the patient that he has OM to his L foot.  Bone cx that were collected in Dr. Peterson clinic +pseudo.  -L foot wounds debrided.  Full procedure note to follow  -Podiatry recommends continuing with abx and extensive wound care.  No surgical intervention.  -Abx per Primary  -L foot dressed with Vashe-soaked gauze, abd pad, Kerlix, and ACE wrap  -Wound care orders placed  -WBAT in [facility] boot  -Podiatry will continue to follow    Future Discharge Recommendations  -Patient to follow up in outpatient Podiatry clinic with Dr. Peterson. Podiatry will schedule  -Antibiotics per Primary  -HH/SNF to do dressings 2-3 times a week as follows:  Rinse L foot wound with Vashe and pat dry.  Apply Hydrofera blue, cast padding, and ACE wrap.  -Weight bearing as tolerated in CAM boot  -Keep dressings clean, dry, and intact until follow-up appointment

## 2024-10-17 NOTE — PLAN OF CARE
MICU DAILY GOALS     Family/Goals of care/Code Status   Code Status: Full Code    24H Vital Sign Range  Temp:  [93 °F (33.9 °C)-98.2 °F (36.8 °C)]   Pulse:  [100-121]   Resp:  [8-27]   BP: (102-161)/(53-98)   SpO2:  [94 %-100 %]      Shift Events (include procedures and significant events)   No acute events throughout shift, pt remains in insulin, oct, and d5 1/2 NS gtts. Team notified that BG > 200, orders to not titrate up gtt and bridge with lantus placed. See MAR.     AWAKE RASS: Goal - RASS Goal: 0-->alert and calm  Actual - RASS (Deshpande Agitation-Sedation Scale): drowsy    Restraint necessity: Not necessary   BREATHE SBT: Not intubated    Coordinate A & B, analgesics/sedatives Pain: managed   SAT: Not intubated   Delirium CAM-ICU: Overall CAM-ICU: Negative   Early(intubated/ Progressive (non-intubated) Mobility MOVE Screen (INTUBATED ONLY): Not intubated    Activity: Activity Management: Rolling - L1   Feeding/Nutrition Diet order: Diet/Nutrition Received: NPO,     Thrombus DVT prophylaxis: VTE Core Measure: Pharmacological prophylaxis initiated/maintained   HOB Elevation Head of Bed (HOB) Positioning: HOB at 30 degrees   Ulcer Prophylaxis GI: yes   Glucose control managed Glycemic Management: blood glucose monitored   Skin Skin assessment:     Sacrum intact/not altered? Yes  Heels intact/not altered? Yes  Surgical wound? No    CHECK ONE!   (no altered skin or altered skin) and sub boxes:  [] No Altered Skin Integrity Present    []Prevention Measures Documented    [x] Altered Skin Integrity Present or Discovered   [x] LDA present in EPIC, daily doc completed              [] LDA added if not in EPIC (describe wound).                    When describing wound, do not stage, use descriptive words only.    [] Wound Image Taken (required on admit,                   transfer/discharge and every Tuesday)    Wound Care Consulted? No   Bowel Function no issues    Indwelling Catheter Necessity      Urethral Catheter  10/16/24 1312 Temperature probe 16 Fr.-Reason for Continuing Urinary Catheterization: Critically ill in ICU and requiring hourly monitoring of intake/output          De-escalation Antibiotics No        VS and assessment per flow sheet, patient progressing towards goals as tolerated, plan of care reviewed with  Idnio Ryder , all concerns addressed, will continue to monitor.

## 2024-10-17 NOTE — ASSESSMENT & PLAN NOTE
Pt with reported buckets of blood and small volume hematemesis. Hx of colon cancer.   Hgb stable on arrival at 13.1. Evaluated by GI on arrival, no active hematemesis noted.   Suspect patient had a valorie-king tear vs vomiting of old, ingested blood products from recent dental procedure    - Consult GI, appreciate recs  - continue PPI 40mg BID  - CBC BID  - Type and screen  - Transfuse PRBC for hemoglobin <7

## 2024-10-17 NOTE — PROGRESS NOTES
[facility] - Medical [facility]  Critical Care Medicine  Progress Note    Patient Name: Indio Ryder Jr.  MRN: [id number]  Admission Date: 10/16/2024  Hospital Length of Stay: 1 days  Code Status: Full Code  Attending Provider: Henry Snyder*  Primary Care Provider: Lorena Thakur MD   Principal Problem: DKA (diabetic ketoacidosis)    Subjective:     HPI:  Indio Ryder Jr. is a 54-year-old male with a past medical history of diabetes with recurrent DKA, colon adenocarcinoma, hyperlipidemia, hypertension, shoulder impingement, osteomyelitis of right foot who presented with hematemesis associated with nausea and vomiting.  Per EMS, they report patient was lethargic, hypotensive and tachycardic, found at home with a bucket of blood next to him.  He reports emesis for the last 2 days associated with hematemesis.  Per EMS, blood sugar 330s.  Patient states that he has been feeling sick with nausea vomiting but no abdominal pain for the last 2 days.  He has a family member present at bedside.  She reports that she has not visited him and heard from him for the last 2 days, states he was his normal self on Monday morning. She believes he had some dental work with tooth extraction and a root canal done on Monday after she spoke with him.  He denies any fevers or chills, falls or trauma.  He denies any hematochezia or melena. At baseline he is independently capable of performing ADL's and drives. Currently lives alone.    In the ED, , , CO2 8, Glucose 375, BUN 39, Creatinine 3.1, Calcium 11.2, Alk Phos 166 AST 8, ALT 8, Anion gap 27, VBG 7.171/27/38/9.9/-19, hemoglobin 13.1, Beta hydroxybutyrate 4.3. He was treated with vanc/zosyn, PPI, octreotide, and insulin gtt has been started. Patient to be admitted to MICU for further management of GI bleed and DKA with resulting severe acidosis.     Hospital/ICU Course:  Patient admitted to MICU for management of AMS with significant acidosis in setting of DKA  and sepsis. DKA treated with insulin gtt and fluids per DKA protocol. Inciting event likely infection vs insulin non-compliance. Left foot X-ray notable for acute OM of the 3rd metatarsal base, cuboid, and lateral cuneiform. Podiatry consulted, foot debrided at bedside, no role for surgical intervention. Blood cultures with GNRs. Treated with vanc/cefepime. GI consulted given reported hematemesis, however patient did not have recurrence of hematemesis on arrival to [facility] and hgb stable. Suspect valorie-king tear vs vomiting of ingested blood from recent dental procedure (had a tooth extraction two days prior to admission). No role for intervention.     Interval History/Significant Events: Patient remains on insulin gtt and d5 1/2 NS given that he is still largely NPO due to lethargy and poor po intake. No further episodes of hematemesis. Evaluated by podiatry, foot debrided. No further role for surgery. Blood cultures with GNRs, zosyn transitioned to cefepime (rapid ID with Klebsiella, inducible AmpC resistance).     Review of Systems  Objective:     Vital Signs (Most Recent):  Temp: 97.3 °F (36.3 °C) (10/17/24 1501)  Pulse: 105 (10/17/24 1501)  Resp: 12 (10/17/24 1501)  BP: (!) 176/114 (10/17/24 1501)  SpO2: 97 % (10/17/24 1501) Vital Signs (24h Range):  Temp:  [94.8 °F (34.9 °C)-98.2 °F (36.8 °C)] 97.3 °F (36.3 °C)  Pulse:  [] 105  Resp:  [8-31] 12  SpO2:  [94 %-100 %] 97 %  BP: (102-176)/() 176/114   Weight: 97.5 kg (214 lb 15.2 oz)  Body mass index is 28.36 kg/m².      Intake/Output Summary (Last 24 hours) at 10/17/2024 1556  Last data filed at 10/17/2024 1533  Gross per 24 hour   Intake 5445.74 ml   Output 2365 ml   Net 3080.74 ml          Physical Exam  Vitals and nursing note reviewed.   Constitutional:       Appearance: He is ill-appearing and toxic-appearing.      Comments: Lethargic, minimally responsive to questioning   HENT:      Head: Normocephalic.   Eyes:      General: No scleral  icterus.  Cardiovascular:      Rate and Rhythm: Regular rhythm. Tachycardia present.      Heart sounds: Normal heart sounds. No murmur heard.  Pulmonary:      Effort: Pulmonary effort is normal. No respiratory distress.      Breath sounds: Normal breath sounds. No wheezing or rales.   Abdominal:      General: Abdomen is flat. There is no distension.      Palpations: Abdomen is soft.      Tenderness: There is no abdominal tenderness. There is no guarding.   Musculoskeletal:         General: Normal range of motion.      Cervical back: Normal range of motion.      Right lower leg: No edema.      Left lower leg: No edema.      Comments: Left foot with clean bandage in place   Skin:     General: Skin is warm and dry.      Comments: Left foot wrapped in clean bandage   Neurological:      Motor: Weakness (diffuse) present.            Vents:     Lines/Drains/Airways       Drain  Duration                  Urethral Catheter 10/16/24 1312 Temperature probe 16 Fr. 1 day              Peripheral Intravenous Line  Duration                  Peripheral IV - Single Lumen 10/16/24 1123 18 G Left Antecubital 1 day         Peripheral IV - Single Lumen 10/16/24 1128 18 G Right Antecubital 1 day         Peripheral IV - Single Lumen 10/16/24 1249 20 G Anterior;Distal;Right Forearm 1 day                  Significant Labs:    CBC/Anemia Profile:  Recent Labs   Lab 10/17/24  0643 10/17/24  0831 10/17/24  1258   WBC 7.71 8.17 SEE COMMENT   HGB 10.8* 11.2* SEE COMMENT   HCT 34.8* 35.6* SEE COMMENT    376 SEE COMMENT   MCV 92 89 SEE COMMENT   RDW 14.8* 14.7* SEE COMMENT        Chemistries:  Recent Labs   Lab 10/16/24  1114 10/16/24  1458 10/17/24  0053 10/17/24  0643 10/17/24  0831   *   < > 150* 147* 150*   K 4.4   < > 4.3 4.7 3.8   *   < > 119* 117* 116*   CO2 8*   < > 15* 17* 22*   BUN 39*   < > 29* 24* 23*   CREATININE 3.1*   < > 2.5* 2.3* 2.4*   CALCIUM 11.2*   < > 10.0 9.9 10.1   ALBUMIN 2.6*  --   --  2.2*  --    PROT  9.4*  --   --  8.1  --    BILITOT 0.2  --   --  0.2  --    ALKPHOS 166*  --   --  137*  --    ALT 8*  --   --  6*  --    AST 8*  --   --  13  --    MG  --    < > 2.3 2.3 2.3   PHOS  --    < > 1.7* 2.7 2.6*    < > = values in this interval not displayed.       All pertinent labs within the past 24 hours have been reviewed.    Significant Imaging:  I have reviewed all pertinent imaging results/findings within the past 24 hours.    ABG  Recent Labs   Lab 10/17/24  0830   PH 7.313*   PO2 25*   PCO2 49.9*   HCO3 25.3   BE -1     Assessment/Plan:     Neuro  Encephalopathy, metabolic  Patient with profound lethargy and encephalopathy on arrival. Appears to be slowly improving. Most likely due to DKA vs sepsis     - If mentation worsens, consider CT head    Cardiac/Vascular  Mixed hyperlipidemia  Hold home statin in setting of critical illness; restart as indicated    Renal/  TAHIR (acute kidney injury)  Creatinine 3.1 on admit, baseline around 1.0  - Likely pre-renal from dehydration and volume losses/DKA/GI bleed    Plan:   Lab Results   Component Value Date    CREATININE 2.4 (H) 10/17/2024     - Check urine lytes and renal ultrasound if no improvement with adequate resuscitation   - Strict I&Os and daily weights   - Gentle IVF  - Avoid nephrotoxic agents such as NSAIDs, gadolinium and IV radiocontrast.  - Renally dose meds to current GFR.  - Maintain MAP > 65.      ID  Chronic osteomyelitis of left foot  Culture from 10/13/2024 with pseudomonas. Podiatry started him on Ciprofloxacin as an outpatient, however the medication was never picked up.   Left foot X-ray on arrival with acute OM of the left 3rd metatarsal base, cuboid, and lateral cuneiform.     - Podiatry consulted. Foot wounds debrided. No further surgical intervention indicated  - continue vanc/cefepime     Per podiatry:     Future Discharge Recommendations  -Patient to follow up in outpatient Podiatry clinic with Dr. Peterson. Podiatry will schedule  -Antibiotics  "per Primary  -HH/SNF to do dressings 2-3 times a week as follows:  Rinse L foot wound with Vashe and pat dry.  Apply Hydrofera blue, cast padding, and ACE wrap.  -Weight bearing as tolerated in CAM boot  -Keep dressings clean, dry, and intact until follow-up appointment     Severe sepsis  This patient does have evidence of infective focus  My overall impression is sepsis.  Source: Skin and Soft Tissue (location left foot wound)  Antibiotics given-   Antibiotics (72h ago, onward)      Start     Stop Route Frequency Ordered    10/17/24 1500  vancomycin 1,250 mg in D5W 250 mL IVPB (admixture device)         -- IV Once 10/17/24 1357    10/17/24 1300  ceFEPIme injection 2 g         -- IV Every 12 hours (non-standard times) 10/17/24 1235    10/16/24 1745  vancomycin - pharmacy to dose  (vancomycin IVPB (PEDS and ADULTS))        Placed in "And" Linked Group    -- IV pharmacy to manage frequency 10/16/24 1645          Latest lactate reviewed-  Recent Labs   Lab 10/16/24  1128   POCLAC 1.92     Organ dysfunction indicated by Acute kidney injury and Encephalopathy    - Patient not currently requiring pressor support  - Blood cultures with GNRs, rapid ID with Klebsiella. F/u remaining speciation and sensitivity   - Podiatry consulted regarding OM  - Wound care consulted  - Continue vanc and cefepime (Klebsiella with inducible AmpC resistance)  - F/u repeat blood cultures  - If patient has worsening mentation, consider head CT    Oncology  History of colon cancer  Hx of colon cancer diagnosed in 03/2022 s/p right hemicolectomy    Endocrine  * DKA (diabetic ketoacidosis)  Blood sugar on arrival 375 with a bicarb 8, anion gap 27, BHB 4.3 and pH 7.17    Lab Results   Component Value Date    HGBA1C 11.0 (H) 10/16/2024         Plan:  - Start insulin gtt per protocol with q1h accuchecks while on the drip.    - Once Bicarb >18, Anion gap <10, Glucose <200 on 2 readings and able to tolerate PO intake without nausea and vomiting, " transition to subq insulin with a 1-2 h overlap with drip.    - Bariatric clear diet ordered, patient not currently tolerating due to lethargy, weakness, decreased appetite  - Continue D5 1/2 NS   --BMP, Mg, Phos q4hrs      Type 2 diabetes mellitus with hyperglycemia, with long-term current use of insulin  Home regimen: Jardiance, insulin long acting + SSI. Compliance unclear, does not appear to be filling   --See DKA    GI  Hematemesis  Pt with reported buckets of blood and small volume hematemesis. Hx of colon cancer.   Hgb stable on arrival at 13.1. Evaluated by GI on arrival, no active hematemesis noted.   Suspect patient had a valorie-king tear vs vomiting of old, ingested blood products from recent dental procedure    - Consult GI, appreciate recs  - continue PPI 40mg BID  - CBC BID  - Type and screen  - Transfuse PRBC for hemoglobin <7       Critical Care Daily Checklist:    A: Awake: RASS Goal/Actual Goal: RASS Goal: 0-->alert and calm  Actual:     B: Spontaneous Breathing Trial Performed?     C: SAT & SBT Coordinated?  N/a                      D: Delirium: CAM-ICU Overall CAM-ICU: Negative   E: Early Mobility Performed? Yes   F: Feeding Goal:    Status:     Current Diet Order   Procedures    Diet Clear liquid (no sugar)/Bariatric      AS: Analgesia/Sedation none   T: Thromboembolic Prophylaxis heparin   H: HOB > 300 Yes   U: Stress Ulcer Prophylaxis (if needed) PPI    G: Glucose Control Insulin gtt   B: Bowel Function     I: Indwelling Catheter (Lines & Beck) Necessity    D: De-escalation of Antimicrobials/Pharmacotherapies Cefepime, vanc    Plan for the day/ETD Treat DKA, OM    Code Status:  Family/Goals of Care: Full Code         Critical secondary to Patient has a condition that poses threat to life and bodily function: DKA with acidosis       Critical care was time spent personally by me on the following activities: development of treatment plan with patient or surrogate and bedside caregivers,  discussions with consultants, evaluation of patient's response to treatment, examination of patient, ordering and performing treatments and interventions, ordering and review of laboratory studies, ordering and review of radiographic studies, pulse oximetry, re-evaluation of patient's condition. This critical care time did not overlap with that of any other provider or involve time for any procedures.     Toshia Maldonado,   Critical Care Medicine  [facility]

## 2024-10-17 NOTE — ASSESSMENT & PLAN NOTE
Culture from 10/13/2024 with pseudomonas. Podiatry started him on Ciprofloxacin as an outpatient, however the medication was never picked up.   Left foot X-ray on arrival with acute OM of the left 3rd metatarsal base, cuboid, and lateral cuneiform.     - Podiatry consulted. Foot wounds debrided. No further surgical intervention indicated  - continue vanc/cefepime     Per podiatry:     Future Discharge Recommendations  -Patient to follow up in outpatient Podiatry clinic with Dr. Peterson. Podiatry will schedule  -Antibiotics per Primary  -HH/SNF to do dressings 2-3 times a week as follows:  Rinse L foot wound with Vashe and pat dry.  Apply Hydrofera blue, cast padding, and ACE wrap.  -Weight bearing as tolerated in CAM boot  -Keep dressings clean, dry, and intact until follow-up appointment

## 2024-10-17 NOTE — ASSESSMENT & PLAN NOTE
Blood sugar on arrival 375 with a bicarb 8, anion gap 27, BHB 4.3 and pH 7.17    Lab Results   Component Value Date    HGBA1C 11.0 (H) 10/16/2024         Plan:  - Start insulin gtt per protocol with q1h accuchecks while on the drip.    - Once Bicarb >18, Anion gap <10, Glucose <200 on 2 readings and able to tolerate PO intake without nausea and vomiting, transition to subq insulin with a 1-2 h overlap with drip.    - Bariatric clear diet ordered, patient not currently tolerating due to lethargy, weakness, decreased appetite  - Continue D5 1/2 NS   --BMP, Mg, Phos q4hrs

## 2024-10-17 NOTE — PROGRESS NOTES
Pharmacokinetic Assessment Follow Up: IV Vancomycin    Vancomycin serum concentration assessment(s):    The random level was drawn correctly and can be used to guide therapy at this time. The measurement is below the desired definitive target range of 10 to 20 mcg/mL.    Patient in TAHIR, will continue to pulse dose    Vancomycin Regimen Plan:    Will give 1250 mg iv once now    Re-dose when the random level is less than 20 mcg/mL, next level to be drawn at 0300 on 10/18    Drug levels (last 3 results):  Recent Labs   Lab Result Units 10/17/24  1258 10/17/24  1452   Vancomycin, Random ug/mL SEE COMMENT 7.8       Pharmacy will continue to follow and monitor vancomycin.    Please contact pharmacy at extension [phone] for questions regarding this assessment.    Thank you for the consult,   Maria R Edward       Patient brief summary:  Indio Ryder Jr. is a 54 y.o. male initiated on antimicrobial therapy with IV Vancomycin for treatment of bacteremia    The patient's current regimen is pulse dose with TAHIR    Drug Allergies:   Review of patient's allergies indicates:  No Known Allergies    Actual Body Weight:   97.5 kg    Renal Function:   Estimated Creatinine Clearance: 56.3 mL/min (A) (based on SCr of 1.8 mg/dL (H)).,     Dialysis Method (if applicable):  N/A    CBC (last 72 hours):  Recent Labs   Lab Result Units 10/16/24  1114 10/16/24  1221 10/16/24  1251 10/16/24  1458 10/16/24  2026 10/17/24  0053 10/17/24  0643 10/17/24  0831 10/17/24  1258 10/17/24  1452   WBC K/uL 10.30  --  8.21 8.41 8.65 8.25 7.71 8.17 SEE COMMENT 6.81   Hemoglobin g/dL 13.1*  --  13.1* 13.2* 11.4* 10.8* 10.8* 11.2* SEE COMMENT 10.2*   Hemoglobin A1C %  --  11.0*  --   --   --   --   --   --   --   --    Hematocrit % 43.9  --  42.5 42.5 36.5* 34.5* 34.8* 35.6* SEE COMMENT 32.4*   Platelets K/uL 464*  --  456* 459* 417 397 390 376 SEE COMMENT 357   Gran % % 86.9*  --  81.8* 87.5* 84.6* 81.2* 80.6* 81.0* SEE COMMENT 79.4*   Lymph % % 6.0*  --  7.8*  8.7* 7.7* 6.1* 7.4* 7.0* SEE COMMENT 8.7*   Mono % % 6.6  --  9.5 3.2* 6.9 11.9 11.2 11.4 SEE COMMENT 10.9   Eosinophil % % 0.0  --  0.0 0.0 0.0 0.0 0.0 0.0 SEE COMMENT 0.0   Basophil % % 0.1  --  0.2 0.1 0.2 0.4 0.4 0.4 SEE COMMENT 0.6   Differential Method  Automated  --  Automated Automated Automated Automated Automated Automated SEE COMMENT Automated       Metabolic Panel (last 72 hours):  Recent Labs   Lab Result Units 10/16/24  1114 10/16/24  1312 10/16/24  1458 10/16/24  2143 10/17/24  0053 10/17/24  0643 10/17/24  0831 10/17/24  1452   Sodium mmol/L 147*  --  149* 150* 150* 147* 150* 149*   Potassium mmol/L 4.4  --  2.9* 3.8 4.3 4.7 3.8 3.6   Chloride mmol/L 112*  --  118* 118* 119* 117* 116* 119*   CO2 mmol/L 8*  --  10* 17* 15* 17* 22* 20*   Glucose mg/dL 375*  --  256* 91 138* 213* 220* 202*   Glucose, UA   --  4+*  --   --   --   --   --   --    BUN mg/dL 39*  --  32* 32* 29* 24* 23* 17   Creatinine mg/dL 3.1*  --  2.3* 2.5* 2.5* 2.3* 2.4* 1.8*   Albumin g/dL 2.6*  --   --   --   --  2.2*  --   --    Total Bilirubin mg/dL 0.2  --   --   --   --  0.2  --   --    Alkaline Phosphatase U/L 166*  --   --   --   --  137*  --   --    AST U/L 8*  --   --   --   --  13  --   --    ALT U/L 8*  --   --   --   --  6*  --   --    Magnesium mg/dL  --   --  2.3 2.4 2.3 2.3 2.3  --    Phosphorus mg/dL  --   --  1.8* <1.0* 1.7* 2.7 2.6*  --        Vancomycin Administrations:  vancomycin given in the last 96 hours                     vancomycin 1,250 mg in D5W 250 mL IVPB (admixture device) (mg) 1,250 mg New Bag 10/17/24 1549    vancomycin 2 g in dextrose 5 % 500 mL IVPB (mg) 2,000 mg New Bag 10/16/24 1312                    Microbiologic Results:  Microbiology Results (last 7 days)       Procedure Component Value Units Date/Time    MRSA Screen by PCR [[id number]] Collected: 10/17/24 1438    Order Status: Sent Specimen: Nasal Swab Updated: 10/17/24 1645    Blood culture x two cultures. Draw prior to antibiotics.  [[id number]] Collected: 10/16/24 1221    Order Status: Completed Specimen: Blood from Peripheral, Forearm, Right Updated: 10/17/24 1412     Blood Culture, Routine No Growth to date      No Growth to date    Narrative:      Aerobic and anaerobic    Blood culture x two cultures. Draw prior to antibiotics. [[id number]] Collected: 10/16/24 1221    Order Status: Completed Specimen: Blood from Peripheral, Antecubital, Right Updated: 10/17/24 0630     Blood Culture, Routine Gram stain mary bottle: Gram negative rods      Results called to and read back by:Alex ECHOLS 10/17/2024  06:29    Narrative:      Aerobic and anaerobic    Rapid Organism ID by PCR (from Blood culture) [[id number]]  (Abnormal) Collected: 10/16/24 1221    Order Status: Completed Updated: 10/17/24 0423     Enterococcus faecalis Not Detected     Enterococcus faecium Not Detected     Listeria monocytogenes Not Detected     Staphylococcus spp. Not Detected     Staphylococcus aureus Not Detected     Staphylococcus epidermidis Not Detected     Staphylococcus lugdunensis Not Detected     Streptococcus species Not Detected     Streptococcus agalactiae Not Detected     Streptococcus pneumoniae Not Detected     Streptococcus pyogenes Not Detected     Acinetobacter calcoaceticus/baumannii complex Not Detected     Bacteroides fragilis Not Detected     Enterobacterales See species for ID     Enterobacter cloacae complex Not Detected     Escherichia coli Not Detected     Klebsiella aerogenes Detected     Klebsiella oxytoca Not Detected     Klebsiella pneumoniae group Not Detected     Proteus Not Detected     Salmonella sp Not Detected     Serratia marcescens Not Detected     Haemophilus influenzae Not Detected     Neisseria meningtidis Not Detected     Pseudomonas aeruginosa Not Detected     Stenotrophomonas maltophilia Not Detected     Candida albicans Not Detected     Candida auris Not Detected     Candida glabrata Not Detected     Candida krusei Not Detected      Letty parapsilosis Not Detected     Candida tropicalis Not Detected     Cryptococcus neoformans/gattii Not Detected     CTX-M (ESBL ) Not Detected     IMP (Carbapenem resistant) Not Detected     KPC resistance gene (Carbapenem resistant) Not Detected     mcr-1  Not Detected     mec A/C  Test Not Applicable     mec A/C and MREJ (MRSA) gene Test Not Applicable     NDM (Carbapenem resistant) Not Detected     OXA-48-like (Carbapenem resistant) Not Detected     van A/B (VRE gene) Test Not Applicable     VIM (Carbapenem resistant) Not Detected    Narrative:      Aerobic and anaerobic

## 2024-10-17 NOTE — SUBJECTIVE & OBJECTIVE
Interval History/Significant Events: Patient remains on insulin gtt and d5 1/2 NS given that he is still largely NPO due to lethargy and poor po intake. No further episodes of hematemesis. Evaluated by podiatry, foot debrided. No further role for surgery. Blood cultures with GNRs, zosyn transitioned to cefepime (rapid ID with Klebsiella, inducible AmpC resistance).     Review of Systems  Objective:     Vital Signs (Most Recent):  Temp: 97.3 °F (36.3 °C) (10/17/24 1501)  Pulse: 105 (10/17/24 1501)  Resp: 12 (10/17/24 1501)  BP: (!) 176/114 (10/17/24 1501)  SpO2: 97 % (10/17/24 1501) Vital Signs (24h Range):  Temp:  [94.8 °F (34.9 °C)-98.2 °F (36.8 °C)] 97.3 °F (36.3 °C)  Pulse:  [] 105  Resp:  [8-31] 12  SpO2:  [94 %-100 %] 97 %  BP: (102-176)/() 176/114   Weight: 97.5 kg (214 lb 15.2 oz)  Body mass index is 28.36 kg/m².      Intake/Output Summary (Last 24 hours) at 10/17/2024 1556  Last data filed at 10/17/2024 1533  Gross per 24 hour   Intake 5445.74 ml   Output 2365 ml   Net 3080.74 ml          Physical Exam  Vitals and nursing note reviewed.   Constitutional:       Appearance: He is ill-appearing and toxic-appearing.      Comments: Lethargic, minimally responsive to questioning   HENT:      Head: Normocephalic.   Eyes:      General: No scleral icterus.  Cardiovascular:      Rate and Rhythm: Regular rhythm. Tachycardia present.      Heart sounds: Normal heart sounds. No murmur heard.  Pulmonary:      Effort: Pulmonary effort is normal. No respiratory distress.      Breath sounds: Normal breath sounds. No wheezing or rales.   Abdominal:      General: Abdomen is flat. There is no distension.      Palpations: Abdomen is soft.      Tenderness: There is no abdominal tenderness. There is no guarding.   Musculoskeletal:         General: Normal range of motion.      Cervical back: Normal range of motion.      Right lower leg: No edema.      Left lower leg: No edema.      Comments: Left foot with clean bandage  in place   Skin:     General: Skin is warm and dry.      Comments: Left foot wrapped in clean bandage   Neurological:      Motor: Weakness (diffuse) present.            Vents:     Lines/Drains/Airways       Drain  Duration                  Urethral Catheter 10/16/24 1312 Temperature probe 16 Fr. 1 day              Peripheral Intravenous Line  Duration                  Peripheral IV - Single Lumen 10/16/24 1123 18 G Left Antecubital 1 day         Peripheral IV - Single Lumen 10/16/24 1128 18 G Right Antecubital 1 day         Peripheral IV - Single Lumen 10/16/24 1249 20 G Anterior;Distal;Right Forearm 1 day                  Significant Labs:    CBC/Anemia Profile:  Recent Labs   Lab 10/17/24  0643 10/17/24  0831 10/17/24  1258   WBC 7.71 8.17 SEE COMMENT   HGB 10.8* 11.2* SEE COMMENT   HCT 34.8* 35.6* SEE COMMENT    376 SEE COMMENT   MCV 92 89 SEE COMMENT   RDW 14.8* 14.7* SEE COMMENT        Chemistries:  Recent Labs   Lab 10/16/24  1114 10/16/24  1458 10/17/24  0053 10/17/24  0643 10/17/24  0831   *   < > 150* 147* 150*   K 4.4   < > 4.3 4.7 3.8   *   < > 119* 117* 116*   CO2 8*   < > 15* 17* 22*   BUN 39*   < > 29* 24* 23*   CREATININE 3.1*   < > 2.5* 2.3* 2.4*   CALCIUM 11.2*   < > 10.0 9.9 10.1   ALBUMIN 2.6*  --   --  2.2*  --    PROT 9.4*  --   --  8.1  --    BILITOT 0.2  --   --  0.2  --    ALKPHOS 166*  --   --  137*  --    ALT 8*  --   --  6*  --    AST 8*  --   --  13  --    MG  --    < > 2.3 2.3 2.3   PHOS  --    < > 1.7* 2.7 2.6*    < > = values in this interval not displayed.       All pertinent labs within the past 24 hours have been reviewed.    Significant Imaging:  I have reviewed all pertinent imaging results/findings within the past 24 hours.

## 2024-10-17 NOTE — PROGRESS NOTES
Pharmacokinetic Initial Assessment: IV Vancomycin    Assessment/Plan:    Initiate intravenous vancomycin with loading dose of 2000 mg once with subsequent doses when random concentrations are less than 20 mcg/mL  Desired empiric serum trough concentration is 15 to 20 mcg/mL  Draw vancomycin random level on 10/17/2024 at 1300.  Pharmacy will continue to follow and monitor vancomycin.      Please contact pharmacy at extension [phone] with any questions regarding this assessment.     Thank you for the consult,   Zeynep Deo       Patient brief summary:  Indio Ryder Jr. is a 54 y.o. male initiated on antimicrobial therapy with IV Vancomycin for treatment of suspected sepsis    Drug Allergies:   Review of patient's allergies indicates:  No Known Allergies    Actual Body Weight:   97.5 kg    Renal Function:   Estimated Creatinine Clearance: 44.1 mL/min (A) (based on SCr of 2.3 mg/dL (H)).,     Dialysis Method (if applicable):  N/A    CBC (last 72 hours):  Recent Labs   Lab Result Units 10/16/24  1114 10/16/24  1221 10/16/24  1251 10/16/24  1458   WBC K/uL 10.30  --  8.21 8.41   Hemoglobin g/dL 13.1*  --  13.1* 13.2*   Hemoglobin A1C %  --  11.0*  --   --    Hematocrit % 43.9  --  42.5 42.5   Platelets K/uL 464*  --  456* 459*   Gran % % 86.9*  --  81.8* 87.5*   Lymph % % 6.0*  --  7.8* 8.7*   Mono % % 6.6  --  9.5 3.2*   Eosinophil % % 0.0  --  0.0 0.0   Basophil % % 0.1  --  0.2 0.1   Differential Method  Automated  --  Automated Automated       Metabolic Panel (last 72 hours):  Recent Labs   Lab Result Units 10/16/24  1114 10/16/24  1312 10/16/24  1458   Sodium mmol/L 147*  --  149*   Potassium mmol/L 4.4  --  2.9*   Chloride mmol/L 112*  --  118*   CO2 mmol/L 8*  --  10*   Glucose mg/dL 375*  --  256*   Glucose, UA   --  4+*  --    BUN mg/dL 39*  --  32*   Creatinine mg/dL 3.1*  --  2.3*   Albumin g/dL 2.6*  --   --    Total Bilirubin mg/dL 0.2  --   --    Alkaline Phosphatase U/L 166*  --   --    AST U/L 8*   "--   --    ALT U/L 8*  --   --    Magnesium mg/dL  --   --  2.3   Phosphorus mg/dL  --   --  1.8*       Drug levels (last 3 results):  No results for input(s): "VANCOMYCINRA", "VANCORANDOM", "VANCOMYCINPE", "VANCOPEAK", "VANCOMYCINTR", "VANCOTROUGH" in the last 72 hours.    Microbiologic Results:  Microbiology Results (last 7 days)       Procedure Component Value Units Date/Time    Blood culture x two cultures. Draw prior to antibiotics. [[id number]] Collected: 10/16/24 1221    Order Status: Sent Specimen: Blood from Peripheral, Antecubital, Right Updated: 10/16/24 1241    Blood culture x two cultures. Draw prior to antibiotics. [[id number]] Collected: 10/16/24 1221    Order Status: Sent Specimen: Blood from Peripheral, Forearm, Right Updated: 10/16/24 1241            "

## 2024-10-17 NOTE — HOSPITAL COURSE
Patient admitted to MICU for management of AMS with significant acidosis in setting of DKA and sepsis. DKA treated with insulin gtt and fluids per DKA protocol. Inciting event likely infection vs insulin non-compliance. Left foot X-ray notable for acute OM of the 3rd metatarsal base, cuboid, and lateral cuneiform. Podiatry consulted, foot debrided at bedside, no role for surgical intervention. Blood cultures with GNRs. Treated with cefepime. GI consulted given reported hematemesis, however patient did not have recurrence of hematemesis on arrival to [facility] and hgb stable. Suspect valorie-king tear vs vomiting of ingested blood from recent dental procedure (had a tooth extraction two days prior to admission). No role for further intervention per GI.

## 2024-10-17 NOTE — ASSESSMENT & PLAN NOTE
Home regimen: Jardiance, insulin long acting + SSI. Compliance unclear, does not appear to be filling   --See DKA

## 2024-10-17 NOTE — SUBJECTIVE & OBJECTIVE
Scheduled Meds:   insulin glargine U-100 (Lantus)  26 Units Subcutaneous Nightly    pantoprazole  40 mg Intravenous BID    piperacillin-tazobactam (Zosyn) IV (PEDS and ADULTS) (extended infusion is not appropriate)  4.5 g Intravenous Q8H     Continuous Infusions:   0/9% NACL & POTASSIUM CHLORIDE 20 MEQ/L   Intravenous Continuous   Held at 10/16/24 1945    D5 and 0.45% NaCl   Intravenous Continuous  mL/hr at 10/17/24 0733 Rate Verify at 10/17/24 0733    D5 and 0.45% NaCl   Intravenous Continuous        insulin regular 1 units/mL infusion orderable (DKA)  0-0.2 Units/kg/hr Intravenous Continuous 1 mL/hr at 10/17/24 0733 0.01 Units/kg/hr at 10/17/24 0733    octreotide (SANDOSTATIN) 500 mcg in 0.9% NaCl 100 mL infusion  50 mcg/hr Intravenous Continuous 10 mL/hr at 10/17/24 0737 50 mcg/hr at 10/17/24 0737     PRN Meds:  Current Facility-Administered Medications:     0.9%  NaCl infusion (for blood administration), , Intravenous, Q24H PRN    dextrose 10%, 12.5 g, Intravenous, PRN    dextrose 10%, 25 g, Intravenous, PRN    D5 and 0.45% NaCl, , Intravenous, Continuous PRN    ondansetron, 4 mg, Intravenous, Q6H PRN    sodium chloride 0.9%, 10 mL, Intravenous, PRN    Pharmacy to dose Vancomycin consult, , , Once **AND** vancomycin - pharmacy to dose, , Intravenous, pharmacy to manage frequency    Review of patient's allergies indicates:  No Known Allergies     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    Right foot    Colon adenocarcinoma 04/12/2022    Diabetic ketoacidosis without coma associated with type 2 diabetes mellitus 05/30/2017    Diabetic ulcer of right foot associated with type 2 diabetes mellitus 06/03/2015    Disorder of kidney and ureter     Essential hypertension 06/06/2013    Group B streptococcal infection 01/13/2017    Mixed hyperlipidemia 08/12/2014    Septic arthritis of left foot 04/28/2021    Shoulder impingement 08/12/2014    Subacute osteomyelitis of right foot  01/12/2017    Streptococcus agalactiae, Actinomyces odontolyticus    Traumatic amputation of fifth toe of right foot 07/02/2015    Type 2 diabetes mellitus with diabetic neuropathy, with long-term current use of insulin 05/03/2016     Past Surgical History:   Procedure Laterality Date    COLONOSCOPY Right 1/19/2022    Procedure: COLONOSCOPY;  Surgeon: Tj Haile MD;  Location: [facility] ENDO (4TH FLR);  Service: Endoscopy;  Laterality: Right;  previous order un-usable/poor prep 1/18/22  RAPID COVID test arrival 12:20  instructions handed to pt-     COLONOSCOPY N/A 3/21/2022    Procedure: COLONOSCOPY;  Surgeon: Sheng Haque MD;  Location: [facility] ENDO (2ND FLR);  Service: Endoscopy;  Laterality: N/A;  Colonoscopy with AES for hepatix flexure polyp removal, 2nd floor  Pt is fully vaccinated-DS  Pt prescribed Plavix by Dr. Stahl in Jan. 2022, however pt states that he never started taking it-DS  3/16/22-Instructions sent via portal-DS    COLONOSCOPY N/A 9/13/2023    Procedure: COLONOSCOPY;  Surgeon: Erik Stout MD;  Location: [facility] ENDO (4TH FLR);  Service: Colon and Rectal;  Laterality: N/A;  Referred by Dr. Stout, [facility], [facility], portal -ml    DEBRIDEMENT OF FOOT Left 7/14/2019    Procedure: DEBRIDEMENT, FOOT, with left 5th ray amputation;  Surgeon: Sis Hickman DPM;  Location: [facility] OR 2ND FLR;  Service: Podiatry;  Laterality: Left;    DEBRIDEMENT OF FOOT Left 7/17/2019    Procedure: DEBRIDEMENT, FOOT with leftt 5th ray partial amputation with Neox Graft;  Surgeon: Mai Burrell DPM;  Location: [facility] OR 2ND FLR;  Service: Podiatry;  Laterality: Left;  t    DEBRIDEMENT OF FOOT Bilateral 4/29/2021    Procedure: DEBRIDEMENT, FOOT;  Surgeon: Mai Burrell DPM;  Location: [facility] OR 1ST FLR;  Service: Podiatry;  Laterality: Bilateral;  Graft application    DEBRIDEMENT OF FOOT Left 7/31/2023    Procedure: DEBRIDEMENT, FOOT;  Surgeon: Marivel Peterson DPM;  Location: [facility] OR 2ND FLR;  Service:  Podiatry;  Laterality: Left;    TOE AMPUTATION Right 06/05/2015    TOE AMPUTATION Right 01/19/2017    XI ROBOTIC COLECTOMY, RIGHT N/A 4/25/2022    Procedure: XI ROBOTIC COLECTOMY, RIGHT (lithotomy, eras low);  Surgeon: Erik Stout MD;  Location: [facility] OR 2ND FLR;  Service: Colon and Rectal;  Laterality: N/A;  Paruch to Goodwin    XI ROBOTIC COLECTOMY, RIGHT  4/25/2022    Procedure: ;  Surgeon: Erik Stout MD;  Location: [facility] OR 2ND FLR;  Service: Colon and Rectal;;       Family History       Problem Relation (Age of Onset)    Cancer Mother    Cataracts Father    Diabetes Mother, Sister    Heart disease Father, Sister    Hypertension Mother, Father, Maternal Aunt    No Known Problems Brother, Sister, Maternal Uncle, Paternal Aunt, Paternal Uncle, Maternal Grandmother, Maternal Grandfather, Paternal Grandmother, Paternal Grandfather          Tobacco Use    Smoking status: Never    Smokeless tobacco: Never   Substance and Sexual Activity    Alcohol use: No    Drug use: No    Sexual activity: Yes     Partners: Female     Birth control/protection: Condom     Review of Systems   Constitutional:  Negative for fatigue and fever.   HENT: Negative.     Eyes: Negative.    Respiratory: Negative.     Cardiovascular: Negative.    Gastrointestinal: Negative.    Endocrine: Negative.    Genitourinary: Negative.    Musculoskeletal:  Positive for gait problem.   Skin:  Positive for color change and wound.        Reports wounds to his L foot   Allergic/Immunologic: Negative.    Hematological: Negative.    Psychiatric/Behavioral: Negative.       Objective:     Vital Signs (Most Recent):  Temp: 97.9 °F (36.6 °C) (10/17/24 0701)  Pulse: (!) 120 (10/17/24 0701)  Resp: (!) 31 (10/17/24 0701)  BP: (!) 148/72 (10/17/24 0701)  SpO2: 97 % (10/17/24 0701) Vital Signs (24h Range):  Temp:  [93 °F (33.9 °C)-98.2 °F (36.8 °C)] 97.9 °F (36.6 °C)  Pulse:  [100-121] 120  Resp:  [8-31] 31  SpO2:  [94 %-100 %] 97 %  BP: (102-161)/(53-98)  148/72     Weight: 97.5 kg (214 lb 15.2 oz)  Body mass index is 28.36 kg/m².    Physical Exam    Constitutional:       Appearance: He is well-developed.     Cardiovascular:      Comments: Dorsalis pedis and posterior tibial pulses are palpable Skin is atrophic, slightly hyperpigmented, and mildly edematous     Musculoskeletal:         General: No tenderness. Normal range of motion.      Comments: Muscle strength is 5/5 in all groups bilaterally.     S/p partial 5th ray amp b/l  S/p hallux amp right  Fat pad atrophy to heels and met heads bilateral     Skin:     General: Skin is warm and dry.      Coloration: Skin is not jaundiced, mottled or sallow.      Findings: Wound (see below) present. No abscess or ecchymosis.      Comments:  2 wounds noted to plantar L foot.  Wound base mixture of granular and necrotic tissue.  Positive probe to bone.  Malodor, crepitus, purulent drainage, erythema, and swelling noted.    Measurment: 6.2 x 3.3 x 0.4 cm (proximal wound), 1.5 x 1.5 x 0.2 cm(distal wound)     Neurological:      Mental Status: He is alert and oriented to person, place, and time.      Comments: [place] 5.07 monofilamant testing is diminished Kenji feet. Sharp/dull sensation diminished Bilaterally. Light touch absent Bilaterally.     Psychiatric:         Behavior: Behavior normal.     Laboratory:  BMP:   Recent Labs   Lab 10/17/24  0643   *   *   K 4.7   *   CO2 17*   BUN 24*   CREATININE 2.3*   CALCIUM 9.9   MG 2.3     CBC:   Recent Labs   Lab 10/17/24  0643   WBC 7.71   RBC 3.78*   HGB 10.8*   HCT 34.8*      MCV 92   MCH 28.6   MCHC 31.0*     Diagnostic Results:  I have reviewed all pertinent imaging results/findings within the past 24 hours.    Clinical Findings:  L plantar foot pre debridement    L plantar foot post debridement

## 2024-10-18 ENCOUNTER — HOSPITAL ENCOUNTER (OUTPATIENT)
Dept: WOUND CARE | Facility: HOSPITAL | Age: 54
Discharge: HOME | End: 2024-10-18
Attending: PODIATRIST
Payer: MEDICARE

## 2024-10-18 ENCOUNTER — TELEPHONE (OUTPATIENT)
Dept: PODIATRY | Facility: CLINIC | Age: 54
End: 2024-10-18
Payer: MEDICARE

## 2024-10-18 PROBLEM — E87.0 HYPERNATREMIA: Status: ACTIVE | Noted: 2024-10-18

## 2024-10-18 LAB
ALBUMIN SERPL BCP-MCNC: 2 G/DL (ref 3.5–5.2)
ALLENS TEST: ABNORMAL
ALP SERPL-CCNC: 119 U/L (ref 40–150)
ALT SERPL W/O P-5'-P-CCNC: 6 U/L (ref 10–44)
ANION GAP SERPL CALC-SCNC: 12 MMOL/L (ref 8–16)
ANION GAP SERPL CALC-SCNC: 13 MMOL/L (ref 8–16)
AST SERPL-CCNC: 7 U/L (ref 10–40)
BASOPHILS # BLD AUTO: 0.03 K/UL (ref 0–0.2)
BASOPHILS NFR BLD: 0.5 % (ref 0–1.9)
BILIRUB SERPL-MCNC: 0.2 MG/DL (ref 0.1–1)
BUN SERPL-MCNC: 11 MG/DL (ref 6–20)
BUN SERPL-MCNC: 13 MG/DL (ref 6–20)
BUN SERPL-MCNC: 13 MG/DL (ref 6–20)
BUN SERPL-MCNC: 14 MG/DL (ref 6–20)
CA-I BLDV-SCNC: 1.2 MMOL/L (ref 1.06–1.42)
CA-I BLDV-SCNC: 1.2 MMOL/L (ref 1.06–1.42)
CA-I BLDV-SCNC: 1.22 MMOL/L (ref 1.06–1.42)
CA-I BLDV-SCNC: 1.25 MMOL/L (ref 1.06–1.42)
CALCIUM SERPL-MCNC: 9.3 MG/DL (ref 8.7–10.5)
CALCIUM SERPL-MCNC: 9.3 MG/DL (ref 8.7–10.5)
CALCIUM SERPL-MCNC: 9.6 MG/DL (ref 8.7–10.5)
CALCIUM SERPL-MCNC: 9.9 MG/DL (ref 8.7–10.5)
CHLORIDE SERPL-SCNC: 110 MMOL/L (ref 95–110)
CHLORIDE SERPL-SCNC: 115 MMOL/L (ref 95–110)
CHLORIDE SERPL-SCNC: 116 MMOL/L (ref 95–110)
CHLORIDE SERPL-SCNC: 116 MMOL/L (ref 95–110)
CO2 SERPL-SCNC: 21 MMOL/L (ref 23–29)
CO2 SERPL-SCNC: 21 MMOL/L (ref 23–29)
CO2 SERPL-SCNC: 22 MMOL/L (ref 23–29)
CO2 SERPL-SCNC: 22 MMOL/L (ref 23–29)
CREAT SERPL-MCNC: 1.5 MG/DL (ref 0.5–1.4)
CREAT SERPL-MCNC: 1.8 MG/DL (ref 0.5–1.4)
CREAT SERPL-MCNC: 1.8 MG/DL (ref 0.5–1.4)
CREAT SERPL-MCNC: 1.9 MG/DL (ref 0.5–1.4)
DIFFERENTIAL METHOD BLD: ABNORMAL
EOSINOPHIL # BLD AUTO: 0 K/UL (ref 0–0.5)
EOSINOPHIL NFR BLD: 0.2 % (ref 0–8)
ERYTHROCYTE [DISTWIDTH] IN BLOOD BY AUTOMATED COUNT: 14.8 % (ref 11.5–14.5)
EST. GFR  (NO RACE VARIABLE): 40.9 ML/MIN/1.73 M^2
EST. GFR  (NO RACE VARIABLE): 43.6 ML/MIN/1.73 M^2
EST. GFR  (NO RACE VARIABLE): 43.6 ML/MIN/1.73 M^2
EST. GFR  (NO RACE VARIABLE): 54.3 ML/MIN/1.73 M^2
GLUCOSE SERPL-MCNC: 164 MG/DL (ref 70–110)
GLUCOSE SERPL-MCNC: 164 MG/DL (ref 70–110)
GLUCOSE SERPL-MCNC: 184 MG/DL (ref 70–110)
GLUCOSE SERPL-MCNC: 205 MG/DL (ref 70–110)
HCO3 UR-SCNC: 23 MMOL/L (ref 24–28)
HCT VFR BLD AUTO: 32.6 % (ref 40–54)
HGB BLD-MCNC: 10 G/DL (ref 14–18)
IMM GRANULOCYTES # BLD AUTO: 0.05 K/UL (ref 0–0.04)
IMM GRANULOCYTES NFR BLD AUTO: 0.8 % (ref 0–0.5)
LYMPHOCYTES # BLD AUTO: 1 K/UL (ref 1–4.8)
LYMPHOCYTES NFR BLD: 15.8 % (ref 18–48)
MAGNESIUM SERPL-MCNC: 1.8 MG/DL (ref 1.6–2.6)
MAGNESIUM SERPL-MCNC: 1.9 MG/DL (ref 1.6–2.6)
MAGNESIUM SERPL-MCNC: 2.1 MG/DL (ref 1.6–2.6)
MCH RBC QN AUTO: 27.6 PG (ref 27–31)
MCHC RBC AUTO-ENTMCNC: 30.7 G/DL (ref 32–36)
MCV RBC AUTO: 90 FL (ref 82–98)
MONOCYTES # BLD AUTO: 0.8 K/UL (ref 0.3–1)
MONOCYTES NFR BLD: 13.1 % (ref 4–15)
NEUTROPHILS # BLD AUTO: 4.5 K/UL (ref 1.8–7.7)
NEUTROPHILS NFR BLD: 69.6 % (ref 38–73)
NRBC BLD-RTO: 0 /100 WBC
PCO2 BLDA: 42 MMHG (ref 35–45)
PH SMN: 7.35 [PH] (ref 7.35–7.45)
PHOSPHATE SERPL-MCNC: 1.7 MG/DL (ref 2.7–4.5)
PHOSPHATE SERPL-MCNC: 2.2 MG/DL (ref 2.7–4.5)
PHOSPHATE SERPL-MCNC: 2.8 MG/DL (ref 2.7–4.5)
PLATELET # BLD AUTO: 366 K/UL (ref 150–450)
PMV BLD AUTO: 10 FL (ref 9.2–12.9)
PO2 BLDA: 36 MMHG (ref 40–60)
POC BE: -3 MMOL/L
POC SATURATED O2: 66 % (ref 95–100)
POC TCO2: 24 MMOL/L (ref 24–29)
POCT GLUCOSE: 139 MG/DL (ref 70–110)
POCT GLUCOSE: 140 MG/DL (ref 70–110)
POCT GLUCOSE: 144 MG/DL (ref 70–110)
POCT GLUCOSE: 146 MG/DL (ref 70–110)
POCT GLUCOSE: 146 MG/DL (ref 70–110)
POCT GLUCOSE: 150 MG/DL (ref 70–110)
POCT GLUCOSE: 160 MG/DL (ref 70–110)
POCT GLUCOSE: 167 MG/DL (ref 70–110)
POCT GLUCOSE: 175 MG/DL (ref 70–110)
POCT GLUCOSE: 179 MG/DL (ref 70–110)
POCT GLUCOSE: 186 MG/DL (ref 70–110)
POCT GLUCOSE: 189 MG/DL (ref 70–110)
POCT GLUCOSE: 219 MG/DL (ref 70–110)
POTASSIUM SERPL-SCNC: 3.4 MMOL/L (ref 3.5–5.1)
POTASSIUM SERPL-SCNC: 3.8 MMOL/L (ref 3.5–5.1)
POTASSIUM SERPL-SCNC: 3.8 MMOL/L (ref 3.5–5.1)
POTASSIUM SERPL-SCNC: 3.9 MMOL/L (ref 3.5–5.1)
PROT SERPL-MCNC: 7.1 G/DL (ref 6–8.4)
RBC # BLD AUTO: 3.62 M/UL (ref 4.6–6.2)
SAMPLE: ABNORMAL
SITE: ABNORMAL
SODIUM SERPL-SCNC: 145 MMOL/L (ref 136–145)
SODIUM SERPL-SCNC: 149 MMOL/L (ref 136–145)
VANCOMYCIN SERPL-MCNC: 14.5 UG/ML
WBC # BLD AUTO: 6.4 K/UL (ref 3.9–12.7)

## 2024-10-18 PROCEDURE — 63600175 PHARM REV CODE 636 W HCPCS: Mod: HCNC | Performed by: STUDENT IN AN ORGANIZED HEALTH CARE EDUCATION/TRAINING PROGRAM

## 2024-10-18 PROCEDURE — 63600175 PHARM REV CODE 636 W HCPCS: Mod: HCNC

## 2024-10-18 PROCEDURE — 20600001 HC STEP DOWN PRIVATE ROOM: Mod: HCNC

## 2024-10-18 PROCEDURE — 87040 BLOOD CULTURE FOR BACTERIA: CPT | Mod: HCNC

## 2024-10-18 PROCEDURE — 25000003 PHARM REV CODE 250: Mod: HCNC | Performed by: INTERNAL MEDICINE

## 2024-10-18 PROCEDURE — 99233 SBSQ HOSP IP/OBS HIGH 50: CPT | Mod: HCNC,,, | Performed by: PODIATRIST

## 2024-10-18 PROCEDURE — 63600175 PHARM REV CODE 636 W HCPCS: Mod: HCNC | Performed by: INTERNAL MEDICINE

## 2024-10-18 PROCEDURE — 80048 BASIC METABOLIC PNL TOTAL CA: CPT | Mod: HCNC,XB | Performed by: NURSE PRACTITIONER

## 2024-10-18 PROCEDURE — 83735 ASSAY OF MAGNESIUM: CPT | Mod: HCNC | Performed by: NURSE PRACTITIONER

## 2024-10-18 PROCEDURE — 85025 COMPLETE CBC W/AUTO DIFF WBC: CPT | Mod: HCNC

## 2024-10-18 PROCEDURE — 80053 COMPREHEN METABOLIC PANEL: CPT | Mod: HCNC | Performed by: NURSE PRACTITIONER

## 2024-10-18 PROCEDURE — 82803 BLOOD GASES ANY COMBINATION: CPT | Mod: HCNC

## 2024-10-18 PROCEDURE — 84100 ASSAY OF PHOSPHORUS: CPT | Mod: 91,HCNC | Performed by: NURSE PRACTITIONER

## 2024-10-18 PROCEDURE — 82330 ASSAY OF CALCIUM: CPT | Mod: HCNC | Performed by: STUDENT IN AN ORGANIZED HEALTH CARE EDUCATION/TRAINING PROGRAM

## 2024-10-18 PROCEDURE — 80202 ASSAY OF VANCOMYCIN: CPT | Mod: HCNC | Performed by: INTERNAL MEDICINE

## 2024-10-18 PROCEDURE — 99900035 HC TECH TIME PER 15 MIN (STAT): Mod: HCNC

## 2024-10-18 PROCEDURE — 99233 SBSQ HOSP IP/OBS HIGH 50: CPT | Mod: HCNC,GC,, | Performed by: INTERNAL MEDICINE

## 2024-10-18 PROCEDURE — 25000003 PHARM REV CODE 250: Mod: HCNC

## 2024-10-18 PROCEDURE — S5010 5% DEXTROSE AND 0.45% SALINE: HCPCS | Mod: HCNC | Performed by: INTERNAL MEDICINE

## 2024-10-18 RX ORDER — CEFEPIME HYDROCHLORIDE 2 G/1
2 INJECTION, POWDER, FOR SOLUTION INTRAVENOUS
Status: DISCONTINUED | OUTPATIENT
Start: 2024-10-18 | End: 2024-10-21

## 2024-10-18 RX ORDER — INSULIN ASPART 100 [IU]/ML
0-10 INJECTION, SOLUTION INTRAVENOUS; SUBCUTANEOUS
Status: DISCONTINUED | OUTPATIENT
Start: 2024-10-18 | End: 2024-10-22 | Stop reason: HOSPADM

## 2024-10-18 RX ORDER — POTASSIUM CHLORIDE 7.45 MG/ML
10 INJECTION INTRAVENOUS
Status: COMPLETED | OUTPATIENT
Start: 2024-10-18 | End: 2024-10-18

## 2024-10-18 RX ORDER — GLUCAGON 1 MG
1 KIT INJECTION
Status: DISCONTINUED | OUTPATIENT
Start: 2024-10-18 | End: 2024-10-22 | Stop reason: HOSPADM

## 2024-10-18 RX ORDER — CHLORPROMAZINE HYDROCHLORIDE 25 MG/1
25 TABLET, FILM COATED ORAL ONCE AS NEEDED
Status: COMPLETED | OUTPATIENT
Start: 2024-10-18 | End: 2024-10-19

## 2024-10-18 RX ADMIN — SODIUM PHOSPHATE, MONOBASIC, MONOHYDRATE AND SODIUM PHOSPHATE, DIBASIC, ANHYDROUS 20.01 MMOL: 142; 276 INJECTION, SOLUTION INTRAVENOUS at 03:10

## 2024-10-18 RX ADMIN — HEPARIN SODIUM 5000 UNITS: 5000 INJECTION INTRAVENOUS; SUBCUTANEOUS at 01:10

## 2024-10-18 RX ADMIN — DEXTROSE AND SODIUM CHLORIDE: 5; 450 INJECTION, SOLUTION INTRAVENOUS at 07:10

## 2024-10-18 RX ADMIN — HEPARIN SODIUM 5000 UNITS: 5000 INJECTION INTRAVENOUS; SUBCUTANEOUS at 10:10

## 2024-10-18 RX ADMIN — POTASSIUM CHLORIDE 10 MEQ: 7.46 INJECTION, SOLUTION INTRAVENOUS at 08:10

## 2024-10-18 RX ADMIN — HEPARIN SODIUM 5000 UNITS: 5000 INJECTION INTRAVENOUS; SUBCUTANEOUS at 05:10

## 2024-10-18 RX ADMIN — POTASSIUM CHLORIDE 10 MEQ: 7.46 INJECTION, SOLUTION INTRAVENOUS at 06:10

## 2024-10-18 RX ADMIN — INSULIN ASPART 1 UNITS: 100 INJECTION, SOLUTION INTRAVENOUS; SUBCUTANEOUS at 10:10

## 2024-10-18 RX ADMIN — CEFEPIME 2 G: 2 INJECTION, POWDER, FOR SOLUTION INTRAVENOUS at 10:10

## 2024-10-18 RX ADMIN — PANTOPRAZOLE SODIUM 40 MG: 40 INJECTION, POWDER, LYOPHILIZED, FOR SOLUTION INTRAVENOUS at 08:10

## 2024-10-18 RX ADMIN — CEFEPIME 2 G: 2 INJECTION, POWDER, FOR SOLUTION INTRAVENOUS at 12:10

## 2024-10-18 RX ADMIN — INSULIN GLARGINE 26 UNITS: 100 INJECTION, SOLUTION SUBCUTANEOUS at 10:10

## 2024-10-18 RX ADMIN — POTASSIUM CHLORIDE 10 MEQ: 7.46 INJECTION, SOLUTION INTRAVENOUS at 07:10

## 2024-10-18 RX ADMIN — POTASSIUM CHLORIDE 10 MEQ: 7.46 INJECTION, SOLUTION INTRAVENOUS at 05:10

## 2024-10-18 RX ADMIN — POTASSIUM PHOSPHATE, MONOBASIC AND POTASSIUM PHOSPHATE, DIBASIC 20 MMOL: 224; 236 INJECTION, SOLUTION, CONCENTRATE INTRAVENOUS at 11:10

## 2024-10-18 RX ADMIN — PANTOPRAZOLE SODIUM 40 MG: 40 INJECTION, POWDER, LYOPHILIZED, FOR SOLUTION INTRAVENOUS at 10:10

## 2024-10-18 NOTE — ASSESSMENT & PLAN NOTE
Hypernatremia is likely due to Dehydration. The patient's most recent sodium results are listed below.  Recent Labs     10/17/24  2040 10/17/24  2340 10/18/24  0318   * 149* 149*  149*     Plan  - Aim to correct the sodium by 8-10mEq in 24 hours.   - Plan to correct their hypernatremia with Select IV fluids: D5W/0.45 NaCl   - Will plan to trend the patient's sodium: Every 4 hours in setting of DKA on insulin gtt  - The patient's hypernatremia is improving

## 2024-10-18 NOTE — ASSESSMENT & PLAN NOTE
This patient does have evidence of infective focus  My overall impression is sepsis.  Source: Skin and Soft Tissue (location left foot wound)  Antibiotics given-   Antibiotics (72h ago, onward)      Start     Stop Route Frequency Ordered    10/17/24 1300  ceFEPIme injection 2 g         -- IV Every 12 hours (non-standard times) 10/17/24 1235          Latest lactate reviewed-  Recent Labs   Lab 10/16/24  1128   POCLAC 1.92       Organ dysfunction indicated by Acute kidney injury and Encephalopathy    - Patient not currently requiring pressor support  - Blood cultures with GNRs, rapid ID with Klebsiella. F/u remaining speciation and sensitivity   - F/u repeat blood cultures  - Podiatry consulted regarding OM, left foot wound debrided at bedside. No further surgical plans   - Wound care consulted  - Continue cefepime (Klebsiella with inducible AmpC resistance)    - If patient has worsening mentation, consider head CT

## 2024-10-18 NOTE — ASSESSMENT & PLAN NOTE
Pt with reported buckets of blood and small volume hematemesis. Hx of colon cancer.   Hgb stable on arrival at 13.1. Evaluated by GI on arrival, no active hematemesis noted. Now signed off, appreciate assistance   Suspect patient had a valorie-king tear vs vomiting of old, ingested blood products from recent dental procedure    - continue PPI 40mg BID  - Trend CBC daily, hgb has been stable since admission  - Type and screen  - Transfuse PRBC for hemoglobin <7

## 2024-10-18 NOTE — TELEPHONE ENCOUNTER
Spoke with patient sister in reference to patient is currently in ICU and she would like to speak with Dr. Almonte about patient care. Staff informed patient sister message will be sent provider please allow her some time to respond to message.

## 2024-10-18 NOTE — PROGRESS NOTES
[facility]  Critical Care Medicine  Progress Note    Patient Name: Indio Ryder Jr.  MRN: [id number]  Admission Date: 10/16/2024  Hospital Length of Stay: 2 days  Code Status: Full Code  Attending Provider: Henry Snyder*  Primary Care Provider: Lorena Thakur MD   Principal Problem: DKA (diabetic ketoacidosis)    Subjective:     HPI:  Indio Ryder Jr. is a 54-year-old male with a past medical history of diabetes with recurrent DKA, colon adenocarcinoma, hyperlipidemia, hypertension, shoulder impingement, osteomyelitis of right foot who presented with hematemesis associated with nausea and vomiting.  Per EMS, they report patient was lethargic, hypotensive and tachycardic, found at home with a bucket of blood next to him.  He reports emesis for the last 2 days associated with hematemesis.  Per EMS, blood sugar 330s.  Patient states that he has been feeling sick with nausea vomiting but no abdominal pain for the last 2 days.  He has a family member present at bedside.  She reports that she has not visited him and heard from him for the last 2 days, states he was his normal self on Monday morning. She believes he had some dental work with tooth extraction and a root canal done on Monday after she spoke with him.  He denies any fevers or chills, falls or trauma.  He denies any hematochezia or melena. At baseline he is independently capable of performing ADL's and drives. Currently lives alone.    In the ED, , , CO2 8, Glucose 375, BUN 39, Creatinine 3.1, Calcium 11.2, Alk Phos 166 AST 8, ALT 8, Anion gap 27, VBG 7.171/27/38/9.9/-19, hemoglobin 13.1, Beta hydroxybutyrate 4.3. He was treated with vanc/zosyn, PPI, octreotide, and insulin gtt has been started. Patient to be admitted to MICU for further management of GI bleed and DKA with resulting severe acidosis.     Hospital/ICU Course:  Patient admitted to MICU for management of AMS with significant acidosis in setting of DKA  and sepsis. DKA treated with insulin gtt and fluids per DKA protocol. Inciting event likely infection vs insulin non-compliance. Left foot X-ray notable for acute OM of the 3rd metatarsal base, cuboid, and lateral cuneiform. Podiatry consulted, foot debrided at bedside, no role for surgical intervention. Blood cultures with GNRs. Treated with cefepime. GI consulted given reported hematemesis, however patient did not have recurrence of [place] [facility] [place]. Suspect valorie-king tear vs vomiting of ingested blood from recent dental procedure (had a tooth extraction two days prior to admission). No role for further intervention per GI.     Interval History/Significant Events: No events overnight. Patient remains on insulin gtt due to poor toleration of oral intake, will plan on discontinuing drip when able. No further episodes of hematemesis. On cefepime for GNR bacteremia.     Review of Systems  Objective:     Vital Signs (Most Recent):  Temp: 98.6 °F (37 °C) (10/18/24 0701)  Pulse: 103 (10/18/24 1000)  Resp: 19 (10/18/24 1000)  BP: 134/71 (10/18/24 1000)  SpO2: 95 % (10/18/24 1000) Vital Signs (24h Range):  Temp:  [96.7 °F (35.9 °C)-98.8 °F (37.1 °C)] 98.6 °F (37 °C)  Pulse:  [] 103  Resp:  [11-30] 19  SpO2:  [95 %-99 %] 95 %  BP: (102-176)/() 134/71   Weight: 97.5 kg (214 lb 15.2 oz)  Body mass index is 28.36 kg/m².      Intake/Output Summary (Last 24 hours) at 10/18/2024 1029  Last data filed at 10/18/2024 0811  Gross per 24 hour   Intake 3612.52 ml   Output 2075 ml   Net 1537.52 ml          Physical Exam  Vitals and nursing note reviewed.   Constitutional:       Appearance: He is ill-appearing and toxic-appearing.      Comments: Weak and lethargic, mentation improving, more interactive with conversation   HENT:      Head: Normocephalic.   Eyes:      General: No scleral icterus.  Cardiovascular:      Rate and Rhythm: Regular rhythm. Tachycardia present.      Heart sounds: Normal  heart sounds. No murmur heard.  Pulmonary:      Effort: Pulmonary effort is normal. No respiratory distress.      Breath sounds: Normal breath sounds. No wheezing or rales.   Abdominal:      General: Abdomen is flat. There is no distension.      Palpations: Abdomen is soft.      Tenderness: There is no abdominal tenderness. There is no guarding.   Musculoskeletal:         General: Normal range of motion.      Cervical back: Normal range of motion.      Right lower leg: No edema.      Left lower leg: No edema.      Comments: Left foot with clean bandage in place   Skin:     General: Skin is warm and dry.      Comments: Left foot wrapped in clean bandage   Neurological:      Motor: Weakness (diffuse) present.            Vents:     Lines/Drains/Airways       Drain  Duration                  Urethral Catheter 10/16/24 1312 Temperature probe 16 Fr. 1 day              Peripheral Intravenous Line  Duration                  Peripheral IV - Single Lumen 10/16/24 1123 18 G Left Antecubital 1 day         Peripheral IV - Single Lumen 10/16/24 1128 18 G Right Antecubital 1 day         Peripheral IV - Single Lumen 10/16/24 1249 20 G Anterior;Distal;Right Forearm 1 day                  Significant Labs:    CBC/Anemia Profile:  Recent Labs   Lab 10/17/24  1452 10/17/24  2040 10/18/24  0747   WBC 6.81 6.89 6.40   HGB 10.2* 11.1* 10.0*   HCT 32.4* 35.7* 32.6*    360 366   MCV 90 91 90   RDW 14.8* 14.8* 14.8*        Chemistries:  Recent Labs   Lab 10/16/24  1114 10/16/24  1458 10/17/24  0643 10/17/24  0831 10/17/24  2040 10/17/24  2340 10/18/24  0318   *   < > 147*   < > 148* 149* 149*  149*   K 4.4   < > 4.7   < > 4.0 3.9 3.8  3.8   *   < > 117*   < > 114* 115* 116*  116*   CO2 8*   < > 17*   < > 23 22* 21*  21*   BUN 39*   < > 24*   < > 15 14 13  13   CREATININE 3.1*   < > 2.3*   < > 2.0* 1.9* 1.8*  1.8*   CALCIUM 11.2*   < > 9.9   < > 10.0 9.9 9.3  9.3   ALBUMIN 2.6*  --  2.2*  --   --   --  2.0*   PROT  9.4*  --  8.1  --   --   --  7.1   BILITOT 0.2  --  0.2  --   --   --  0.2   ALKPHOS 166*  --  137*  --   --   --  119   ALT 8*  --  6*  --   --   --  6*   AST 8*  --  13  --   --   --  7*   MG  --    < > 2.3   < > 2.1 2.1 1.9   PHOS  --    < > 2.7   < > 2.3* 1.7* 2.2*    < > = values in this interval not displayed.       All pertinent labs within the past 24 hours have been reviewed.    Significant Imaging:  I have reviewed all pertinent imaging results/findings within the past 24 hours.    ABG  Recent Labs   Lab 10/18/24  0904   PH 7.347*   PO2 36*   PCO2 42.0   HCO3 23.0*   BE -3*     Assessment/Plan:     Neuro  Encephalopathy, metabolic  Patient with profound lethargy and encephalopathy on arrival. Appears to be slowly improving. Most likely due to DKA vs sepsis     - If mentation worsens, consider CT head    Cardiac/Vascular  Mixed hyperlipidemia  Hold home statin in setting of critical illness; restart as indicated    Renal/  Hypernatremia  Hypernatremia is likely due to Dehydration. The patient's most recent sodium results are listed below.  Recent Labs     10/17/24  2040 10/17/24  2340 10/18/24  0318   * 149* 149*  149*     Plan  - Aim to correct the sodium by 8-10mEq in 24 hours.   - Plan to correct their hypernatremia with Select IV fluids: D5W/0.45 NaCl   - Will plan to trend the patient's sodium: Every 4 hours in setting of DKA on insulin gtt  - The patient's hypernatremia is improving      TAHIR (acute kidney injury)  Creatinine 3.1 on admit, baseline around 1.0  - Likely pre-renal from dehydration and volume losses/DKA/GI bleed. Improving    Plan:   Lab Results   Component Value Date    CREATININE 1.8 (H) 10/18/2024    CREATININE 1.8 (H) 10/18/2024       - Avoid nephrotoxic agents such as NSAIDs, gadolinium and IV radiocontrast.  - Renally dose meds to current GFR.  - Maintain MAP > 65.      ID  Chronic osteomyelitis of left foot  Culture from 10/13/2024 with pseudomonas. Podiatry started him on  Ciprofloxacin as an outpatient, however the medication was never picked up.   Left foot X-ray on arrival with acute OM of the left 3rd metatarsal base, cuboid, and lateral cuneiform.     - Podiatry consulted. Foot wounds debrided. No further surgical intervention indicated  - continue cefepime     Per podiatry:     Future Discharge Recommendations  -Patient to follow up in outpatient Podiatry clinic with Dr. Peterson. Podiatry will schedule  -Antibiotics per Primary  -HH/SNF to do dressings 2-3 times a week as follows:  Rinse L foot wound with Vashe and pat dry.  Apply Hydrofera blue, cast padding, and ACE wrap.  -Weight bearing as tolerated in CAM boot  -Keep dressings clean, dry, and intact until follow-up appointment     Severe sepsis  This patient does have evidence of infective focus  My overall impression is sepsis.  Source: Skin and Soft Tissue (location left foot wound)  Antibiotics given-   Antibiotics (72h ago, onward)      Start     Stop Route Frequency Ordered    10/17/24 1300  ceFEPIme injection 2 g         -- IV Every 12 hours (non-standard times) 10/17/24 1235          Latest lactate reviewed-  Recent Labs   Lab 10/16/24  1128   POCLAC 1.92       Organ dysfunction indicated by Acute kidney injury and Encephalopathy    - Patient not currently requiring pressor support  - Blood cultures with GNRs, rapid ID with Klebsiella. F/u remaining speciation and sensitivity   - F/u repeat blood cultures  - Podiatry consulted regarding OM, left foot wound debrided at bedside. No further surgical plans   - Wound care consulted  - Continue cefepime (Klebsiella with inducible AmpC resistance)    - If patient has worsening mentation, consider head CT    Oncology  History of colon cancer  Hx of colon cancer diagnosed in 03/2022 s/p right hemicolectomy    Endocrine  * DKA (diabetic ketoacidosis)  Blood sugar on arrival 375 with a bicarb 8, anion gap 27, BHB 4.3 and pH 7.17    Lab Results   Component Value Date    HGBA1C 11.0  (H) 10/16/2024         Plan:  - Start insulin gtt per protocol with q1h accuchecks while on the drip.    - Once Bicarb >18, Anion gap <10, Glucose <200 on 2 readings and able to tolerate PO intake without nausea and vomiting, transition to subq insulin with a 1-2 h overlap with drip.    - Bariatric clear diet ordered, patient not currently tolerating due to lethargy, weakness, decreased appetite  - Continue D5 1/2 NS   --BMP, Mg, Phos q4hrs      Type 2 diabetes mellitus with hyperglycemia, with long-term current use of insulin  Home regimen: Jardiance, insulin long acting + SSI. Compliance unclear, does not appear to be filling   --See DKA    GI  Hematemesis  Pt with reported buckets of blood and small volume hematemesis. Hx of colon cancer.   Hgb stable on arrival at 13.1. Evaluated by GI on arrival, no active hematemesis noted. Now signed off, appreciate assistance   Suspect patient had a valorie-king tear vs vomiting of old, ingested blood products from recent dental procedure    - continue PPI 40mg BID  - Trend CBC daily, hgb has been stable since admission  - Type and screen  - Transfuse PRBC for hemoglobin <7       Critical Care Daily Checklist:    A: Awake: RASS Goal/Actual Goal: RASS Goal: 0-->alert and calm  Actual:     B: Spontaneous Breathing Trial Performed?     C: SAT & SBT Coordinated?  N/a                      D: Delirium: CAM-ICU Overall CAM-ICU: Negative   E: Early Mobility Performed? Yes   F: Feeding Goal:    Status:     Current Diet Order   Procedures    Diet Clear liquid (no sugar)/Bariatric      AS: Analgesia/Sedation none   T: Thromboembolic Prophylaxis heparin   H: HOB > 300 Yes   U: Stress Ulcer Prophylaxis (if needed) PPI   G: Glucose Control Insulin gtt   B: Bowel Function     I: Indwelling Catheter (Lines & Beck) Necessity yes   D: De-escalation of Antimicrobials/Pharmacotherapies D/c vanc, continue cefepime    Plan for the day/ETD Glucose management    Code Status:  Family/Goals of  Care: Full Code         Critical secondary to Patient has a condition that poses threat to life and bodily function: DKA      Critical care was time spent personally by me on the following activities: development of treatment plan with patient or surrogate and bedside caregivers, discussions with consultants, evaluation of patient's response to treatment, examination of patient, ordering and performing treatments and interventions, ordering and review of laboratory studies, ordering and review of radiographic studies, pulse oximetry, re-evaluation of patient's condition. This critical care time did not overlap with that of any other provider or involve time for any procedures.     Toshia Maldonado, DO  Critical Care Medicine  [facility] - [facility] ICU

## 2024-10-18 NOTE — PLAN OF CARE
[facility] Medicine ICU Acceptance Note    Date of Admit: 10/16/2024  Date of Transfer / Stepdown: 10/18/2024  Yanira, EZRA/AMINA, L, Onc (IV chemo w/in 1 month), Gyn/Onc, or other special case?: No  ICU team stepping patient down: MICU/CCU/SICU/NCC  ICU team member giving handoff: Toshia Maldonado    Accepting  team: Q    History of Present Illness:     Indio Ryedr Jr. is a 54-year-old male with a past medical history of diabetes with recurrent DKA, colon adenocarcinoma, hyperlipidemia, hypertension, shoulder impingement, osteomyelitis of right foot who presented with hematemesis associated with nausea and vomiting.  Per EMS, they report patient was lethargic, hypotensive and tachycardic, found at home with a bucket of blood next to him.  He reports emesis for the last 2 days associated with hematemesis.  Per EMS, blood sugar 330s.  Patient states that he has been feeling sick with nausea vomiting but no abdominal pain for the last 2 days.  He has a family member present at bedside.  She reports that she has not visited him and heard from him for the last 2 days, states he was his normal self on Monday morning. She believes he had some dental work with tooth extraction and a root canal done on Monday after she spoke with him.  He denies any fevers or chills, falls or trauma.  He denies any hematochezia or melena. At baseline he is independently capable of performing ADL's and drives. Currently lives alone.     In the ED, , , CO2 8, Glucose 375, BUN 39, Creatinine 3.1, Calcium 11.2, Alk Phos 166 AST 8, ALT 8, Anion gap 27, VBG 7.171/27/38/9.9/-19, hemoglobin 13.1, Beta hydroxybutyrate 4.3. He was treated with vanc/zosyn, PPI, octreotide, and insulin gtt has been started. Patient to be admitted to MICU for further management of GI bleed and DKA with resulting severe acidosis    Hospital/ICU Course:     Hospital/ICU Course:  Patient admitted to MICU for management of AMS with significant acidosis in setting  of DKA and sepsis. DKA treated with insulin gtt and fluids per DKA protocol. Inciting event likely infection vs insulin non-compliance. Left foot X-ray notable for acute OM of the 3rd metatarsal base, cuboid, and lateral cuneiform. Podiatry consulted, foot debrided at bedside, no role for surgical intervention. Blood cultures with GNRs. Treated with cefepime. GI consulted given reported hematemesis, however patient did not have recurrence of [place]em[place] [facility] [place]. Suspect valorie-king tear vs vomiting of ingested blood from recent dental procedure (had a tooth extraction two days prior to admission). No role for further intervention per GI.     Deemed appropriate for transfer to the floor on 10/18/2024, and was accepted to  team Q for further care and management.    Consultants and Procedures:     Consultants:  Consults (From admission, onward)          Status Ordering Provider     Inpatient consult to Podiatry  Once        Provider:  (Not yet assigned)    Completed ANGEL WEEMS     Inpatient consult to Gastroenterology  Once        Provider:  (Not yet assigned)    Completed IAIN COOPER     Inpatient consult to Critical Care Medicine  Once        Provider:  (Not yet assigned)    Completed IAIN COOPER            Procedures:    As documented    Transfer Information:     Diet:  As ordered    Physical Activity:  As tolerated      Pending plan at time of transfer to the floor:  Continue current plan initiated by ICU, will further monitor and adjust as clinically indicated upon arrival to the floor.    -continue to monitor mentation, consider CT head if no improvement   -monitor hypernatremia   -monitor TAHIR appears to be improving   -continue IV Cefepime for bacteremia 22 to Klebsiella, will follow up speciation and sensitivity   -podiatry following for chronic OM, s/p left foot wound debridements with no plans of surgery at this time   -DKA resolved, continue subq insulin and monitor  closely.   -no further GI recommendations for concerns of hematemesis     Patient has been accepted by Hospital Medicine Team G, who will assume care of the patient upon arrival to the floor from the ICU. Please contact ICU team with any concerns prior to transfer to the floor.      Roxi Pack MD  Attending Physician  Department of Hospital Medicine  10/18/2024

## 2024-10-18 NOTE — CARE UPDATE
Attempted to call patient's sister at phone number listed in the chart in order to update her regarding patient's care. No answer.     Toshia Maldonado, DO PGY3

## 2024-10-18 NOTE — PROGRESS NOTES
[facility]  Podiatry  Progress Note    Patient Name: Indio Ryder Jr.  MRN: [id number]  Admission Date: 10/16/2024  Hospital Length of Stay: 2 days  Attending Physician: Henry Snyder*  Primary Care Provider: Lorena Thakur MD     Subjective:     Interval History: NAEON, Tachycardia noted, afebrile, WBC wnl. 1 day s/p bedside debridement of left foot. Packing dressing of left foot removed without incident. No foot pain overnight. No questions or concerns at this time.        Scheduled Meds:   ceFEPime IV (PEDS and ADULTS)  2 g Intravenous Q12H    heparin (porcine)  5,000 Units Subcutaneous Q8H    insulin glargine U-100 (Lantus)  26 Units Subcutaneous Nightly    pantoprazole  40 mg Intravenous BID     Continuous Infusions:   D5 and 0.45% NaCl   Intravenous Continuous 75 mL/hr at 10/18/24 0805 Rate Verify at 10/18/24 0805    insulin regular 1 units/mL infusion orderable (DKA)  0-0.2 Units/kg/hr Intravenous Continuous 1 mL/hr at 10/18/24 0715 0.01 Units/kg/hr at 10/18/24 0715     PRN Meds:  Current Facility-Administered Medications:     0.9%  NaCl infusion (for blood administration), , Intravenous, Q24H PRN    dextrose 10%, 12.5 g, Intravenous, PRN    dextrose 10%, 25 g, Intravenous, PRN    ondansetron, 4 mg, Intravenous, Q6H PRN    sodium chloride 0.9%, 10 mL, Intravenous, PRN    Review of Systems  Objective:     Vital Signs (Most Recent):  Temp: 98.6 °F (37 °C) (10/18/24 0701)  Pulse: 108 (10/18/24 0800)  Resp: 13 (10/18/24 0800)  BP: (!) 171/98 (10/18/24 0800)  SpO2: 99 % (10/18/24 0800) Vital Signs (24h Range):  Temp:  [96.7 °F (35.9 °C)-98.8 °F (37.1 °C)] 98.6 °F (37 °C)  Pulse:  [] 108  Resp:  [11-30] 13  SpO2:  [95 %-99 %] 99 %  BP: (102-176)/() 171/98     Weight: 97.5 kg (214 lb 15.2 oz)  Body mass index is 28.36 kg/m².    Foot Exam    General  Affect: appropriate       Right Foot/Ankle     Inspection and Palpation  Ecchymosis: none  Tenderness: none   Swelling: none    Arch: normal  Hammertoes: absent  Claw Toes: absent  Hallux valgus: no  Hallux limitus: no  Skin Exam: skin intact;       Left Foot/Ankle      Inspection and Palpation  Ecchymosis: none  Tenderness: none   Swelling: none   Skin Exam: ulcer; no drainage, no dry skin, skin not intact and no maceration     Neurovascular  Dorsalis pedis: 1+  Posterior tibial: 1+  Saphenous nerve sensation: diminished  Tibial nerve sensation: diminished  Superficial peroneal nerve sensation: diminished  Deep peroneal nerve sensation: diminished  Sural nerve sensation: diminished    Comments  Two Large plantar calcaneus wounds proximal > distal with fibro granular wound base with serous drainage without purulence, bleeding, malodor    S/p partial 5th ray amp b/l  S/p hallux amp right  Fat pad atrophy to heels and met heads bilateral         Clinical medial:  Left foot          Laboratory:  A1C:   Recent Labs   Lab 08/08/24  1335 10/16/24  1221   HGBA1C >14.0* 11.0*     BMP:   Recent Labs   Lab 10/18/24  0318   *  164*   *  149*   K 3.8  3.8   *  116*   CO2 21*  21*   BUN 13  13   CREATININE 1.8*  1.8*   CALCIUM 9.3  9.3   MG 1.9     CBC:   Recent Labs   Lab 10/18/24  0747   WBC 6.40   RBC 3.62*   HGB 10.0*   HCT 32.6*      MCV 90   MCH 27.6   MCHC 30.7*     Wound Cultures:   Recent Labs   Lab 05/17/24  0840 06/25/24  1630 07/05/24  0910 10/11/24  0925   LABAERO ENTEROCOCCUS FAECALIS  Few  * No growth No significant isolate PSEUDOMONAS AERUGINOSA  Moderate  *       Diagnostic Results:  I have reviewed all pertinent imaging results/findings within the past 24 hours.  Assessment/Plan:     ID  Chronic osteomyelitis of left foot  Assesment  Wounds to plantar L foot.  Wound base mixture of granular and necrotic tissue.  Positive probe to bone test.  Wound with purulent drainage, malodor, erythema, swelling, bone exposure, crepitus, and undermining.  No tenderness to palpation.  XR L foot shows acute OM to 3rd  met base, cuboid, and lateral cuneiform.  Bone cx 10/11 +pseudomonas. IDSA mild/moderate foot wound infection. 1 day s/p Left foot debridement, patient doing well.    Plan  -Physical exam and imaging findings discussed with the patient.  Discussed with the patient that he has OM to his L foot.  Bone cx that were collected in Dr. Petersno clinic +pseudo.  -Podiatry recommends continuing with abx and extensive wound care.  No surgical intervention.  -Abx per Primary  -L foot dressed with betadine soaked gauze, clean gauze, hydrofera blue, Kerlix, and ACE wrap  -Wound care orders placed  -WBAT in [facility] boot  -Podiatry will continue to follow    Future Discharge Recommendations  -Patient to follow up in outpatient Podiatry clinic with Dr. Peterson. Podiatry will schedule  -Antibiotics per Primary  -HH/SNF to do dressings 2-3 times a week as follows:  Rinse L foot wound with Vashe and pat dry.  Apply Hydrofera blue, cast padding, and ACE wrap.  -Weight bearing as tolerated in CAM boot  -Keep dressings clean, dry, and intact until follow-up appointment              Jose Currie MD  Podiatry  [facility] - [facility]

## 2024-10-18 NOTE — ASSESSMENT & PLAN NOTE
Creatinine 3.1 on admit, baseline around 1.0  - Likely pre-renal from dehydration and volume losses/DKA/GI bleed. Improving    Plan:   Lab Results   Component Value Date    CREATININE 1.8 (H) 10/18/2024    CREATININE 1.8 (H) 10/18/2024       - Avoid nephrotoxic agents such as NSAIDs, gadolinium and IV radiocontrast.  - Renally dose meds to current GFR.  - Maintain MAP > 65.

## 2024-10-18 NOTE — SUBJECTIVE & OBJECTIVE
Subjective:     Interval History: NAEON, Tachycardia noted, afebrile, WBC wnl. 1 day s/p bedside debridement of left foot. Packing dressing of left foot removed without incident. No foot pain overnight. No questions or concerns at this time.        Scheduled Meds:   ceFEPime IV (PEDS and ADULTS)  2 g Intravenous Q12H    heparin (porcine)  5,000 Units Subcutaneous Q8H    insulin glargine U-100 (Lantus)  26 Units Subcutaneous Nightly    pantoprazole  40 mg Intravenous BID     Continuous Infusions:   D5 and 0.45% NaCl   Intravenous Continuous 75 mL/hr at 10/18/24 0805 Rate Verify at 10/18/24 0805    insulin regular 1 units/mL infusion orderable (DKA)  0-0.2 Units/kg/hr Intravenous Continuous 1 mL/hr at 10/18/24 0715 0.01 Units/kg/hr at 10/18/24 0715     PRN Meds:  Current Facility-Administered Medications:     0.9%  NaCl infusion (for blood administration), , Intravenous, Q24H PRN    dextrose 10%, 12.5 g, Intravenous, PRN    dextrose 10%, 25 g, Intravenous, PRN    ondansetron, 4 mg, Intravenous, Q6H PRN    sodium chloride 0.9%, 10 mL, Intravenous, PRN    Review of Systems  Objective:     Vital Signs (Most Recent):  Temp: 98.6 °F (37 °C) (10/18/24 0701)  Pulse: 108 (10/18/24 0800)  Resp: 13 (10/18/24 0800)  BP: (!) 171/98 (10/18/24 0800)  SpO2: 99 % (10/18/24 0800) Vital Signs (24h Range):  Temp:  [96.7 °F (35.9 °C)-98.8 °F (37.1 °C)] 98.6 °F (37 °C)  Pulse:  [] 108  Resp:  [11-30] 13  SpO2:  [95 %-99 %] 99 %  BP: (102-176)/() 171/98     Weight: 97.5 kg (214 lb 15.2 oz)  Body mass index is 28.36 kg/m².    Foot Exam    General  Affect: appropriate       Right Foot/Ankle     Inspection and Palpation  Ecchymosis: none  Tenderness: none   Swelling: none   Arch: normal  Hammertoes: absent  Claw Toes: absent  Hallux valgus: no  Hallux limitus: no  Skin Exam: skin intact;       Left Foot/Ankle      Inspection and Palpation  Ecchymosis: none  Tenderness: none   Swelling: none   Skin Exam: ulcer; no drainage, no  dry skin, skin not intact and no maceration     Neurovascular  Dorsalis pedis: 1+  Posterior tibial: 1+  Saphenous nerve sensation: diminished  Tibial nerve sensation: diminished  Superficial peroneal nerve sensation: diminished  Deep peroneal nerve sensation: diminished  Sural nerve sensation: diminished    Comments  Two Large plantar calcaneus wounds proximal > distal with fibro granular wound base with serous drainage without purulence, bleeding, malodor    S/p partial 5th ray amp b/l  S/p hallux amp right  Fat pad atrophy to heels and met heads bilateral         Clinical medial:  Left foot          Laboratory:  A1C:   Recent Labs   Lab 08/08/24  1335 10/16/24  1221   HGBA1C >14.0* 11.0*     BMP:   Recent Labs   Lab 10/18/24  0318   *  164*   *  149*   K 3.8  3.8   *  116*   CO2 21*  21*   BUN 13  13   CREATININE 1.8*  1.8*   CALCIUM 9.3  9.3   MG 1.9     CBC:   Recent Labs   Lab 10/18/24  0747   WBC 6.40   RBC 3.62*   HGB 10.0*   HCT 32.6*      MCV 90   MCH 27.6   MCHC 30.7*     Wound Cultures:   Recent Labs   Lab 05/17/24  0840 06/25/24  1630 07/05/24  0910 10/11/24  0925   LABAERO ENTEROCOCCUS FAECALIS  Few  * No growth No significant isolate PSEUDOMONAS AERUGINOSA  Moderate  *       Diagnostic Results:  I have reviewed all pertinent imaging results/findings within the past 24 hours.

## 2024-10-18 NOTE — SUBJECTIVE & OBJECTIVE
Interval History/Significant Events: No events overnight. Patient remains on insulin gtt due to poor toleration of oral intake, will plan on discontinuing drip when able. No further episodes of hematemesis. On cefepime for GNR bacteremia.     Review of Systems  Objective:     Vital Signs (Most Recent):  Temp: 98.6 °F (37 °C) (10/18/24 0701)  Pulse: 103 (10/18/24 1000)  Resp: 19 (10/18/24 1000)  BP: 134/71 (10/18/24 1000)  SpO2: 95 % (10/18/24 1000) Vital Signs (24h Range):  Temp:  [96.7 °F (35.9 °C)-98.8 °F (37.1 °C)] 98.6 °F (37 °C)  Pulse:  [] 103  Resp:  [11-30] 19  SpO2:  [95 %-99 %] 95 %  BP: (102-176)/() 134/71   Weight: 97.5 kg (214 lb 15.2 oz)  Body mass index is 28.36 kg/m².      Intake/Output Summary (Last 24 hours) at 10/18/2024 1029  Last data filed at 10/18/2024 0811  Gross per 24 hour   Intake 3612.52 ml   Output 2075 ml   Net 1537.52 ml          Physical Exam  Vitals and nursing note reviewed.   Constitutional:       Appearance: He is ill-appearing and toxic-appearing.      Comments: Weak and lethargic, mentation improving, more interactive with conversation   HENT:      Head: Normocephalic.   Eyes:      General: No scleral icterus.  Cardiovascular:      Rate and Rhythm: Regular rhythm. Tachycardia present.      Heart sounds: Normal heart sounds. No murmur heard.  Pulmonary:      Effort: Pulmonary effort is normal. No respiratory distress.      Breath sounds: Normal breath sounds. No wheezing or rales.   Abdominal:      General: Abdomen is flat. There is no distension.      Palpations: Abdomen is soft.      Tenderness: There is no abdominal tenderness. There is no guarding.   Musculoskeletal:         General: Normal range of motion.      Cervical back: Normal range of motion.      Right lower leg: No edema.      Left lower leg: No edema.      Comments: Left foot with clean bandage in place   Skin:     General: Skin is warm and dry.      Comments: Left foot wrapped in clean bandage    Neurological:      Motor: Weakness (diffuse) present.            Vents:     Lines/Drains/Airways       Drain  Duration                  Urethral Catheter 10/16/24 1312 Temperature probe 16 Fr. 1 day              Peripheral Intravenous Line  Duration                  Peripheral IV - Single Lumen 10/16/24 1123 18 G Left Antecubital 1 day         Peripheral IV - Single Lumen 10/16/24 1128 18 G Right Antecubital 1 day         Peripheral IV - Single Lumen 10/16/24 1249 20 G Anterior;Distal;Right Forearm 1 day                  Significant Labs:    CBC/Anemia Profile:  Recent Labs   Lab 10/17/24  1452 10/17/24  2040 10/18/24  0747   WBC 6.81 6.89 6.40   HGB 10.2* 11.1* 10.0*   HCT 32.4* 35.7* 32.6*    360 366   MCV 90 91 90   RDW 14.8* 14.8* 14.8*        Chemistries:  Recent Labs   Lab 10/16/24  1114 10/16/24  1458 10/17/24  0643 10/17/24  0831 10/17/24  2040 10/17/24  2340 10/18/24  0318   *   < > 147*   < > 148* 149* 149*  149*   K 4.4   < > 4.7   < > 4.0 3.9 3.8  3.8   *   < > 117*   < > 114* 115* 116*  116*   CO2 8*   < > 17*   < > 23 22* 21*  21*   BUN 39*   < > 24*   < > 15 14 13  13   CREATININE 3.1*   < > 2.3*   < > 2.0* 1.9* 1.8*  1.8*   CALCIUM 11.2*   < > 9.9   < > 10.0 9.9 9.3  9.3   ALBUMIN 2.6*  --  2.2*  --   --   --  2.0*   PROT 9.4*  --  8.1  --   --   --  7.1   BILITOT 0.2  --  0.2  --   --   --  0.2   ALKPHOS 166*  --  137*  --   --   --  119   ALT 8*  --  6*  --   --   --  6*   AST 8*  --  13  --   --   --  7*   MG  --    < > 2.3   < > 2.1 2.1 1.9   PHOS  --    < > 2.7   < > 2.3* 1.7* 2.2*    < > = values in this interval not displayed.       All pertinent labs within the past 24 hours have been reviewed.    Significant Imaging:  I have reviewed all pertinent imaging results/findings within the past 24 hours.

## 2024-10-18 NOTE — ASSESSMENT & PLAN NOTE
Assesment  Wounds to plantar L foot.  Wound base mixture of granular and necrotic tissue.  Positive probe to bone test.  Wound with purulent drainage, malodor, erythema, swelling, bone exposure, crepitus, and undermining.  No tenderness to palpation.  XR L foot shows acute OM to 3rd met base, cuboid, and lateral cuneiform.  Bone cx 10/11 +pseudomonas. IDSA mild/moderate foot wound infection. 1 day s/p Left foot debridement, patient doing well.    Plan  -Physical exam and imaging findings discussed with the patient.  Discussed with the patient that he has OM to his L foot.  Bone cx that were collected in Dr. Peterson clinic +pseudo.  -Podiatry recommends continuing with abx and extensive wound care.  No surgical intervention.  -Abx per Primary  -L foot dressed with betadine soaked gauze, clean gauze, hydrofera blue, Kerlix, and ACE wrap  -Wound care orders placed  [facility]  -Podiatry will continue to follow    Future Discharge Recommendations  -Patient to follow up in outpatient Podiatry clinic with Dr. Peterson. Podiatry will schedule  -Antibiotics per Primary  -HH/SNF to do dressings 2-3 times a week as follows:  Rinse L foot wound with Vashe and pat dry.  Apply Hydrofera blue, cast padding, and ACE wrap.  -Weight bearing as tolerated in CAM boot  -Keep dressings clean, dry, and intact until follow-up appointment

## 2024-10-18 NOTE — ASSESSMENT & PLAN NOTE
Culture from 10/13/2024 with pseudomonas. Podiatry started him on Ciprofloxacin as an outpatient, however the medication was never picked up.   Left foot X-ray on arrival with acute OM of the left 3rd metatarsal base, cuboid, and lateral cuneiform.     - Podiatry consulted. Foot wounds debrided. No further surgical intervention indicated  - continue cefepime     Per podiatry:     Future Discharge Recommendations  -Patient to follow up in outpatient Podiatry clinic with Dr. Peterson. Podiatry will schedule  -Antibiotics per Primary  -HH/SNF to do dressings 2-3 times a week as follows:  Rinse L foot wound with Vashe and pat dry.  Apply Hydrofera blue, cast padding, and ACE wrap.  -Weight bearing as tolerated in CAM boot  -Keep dressings clean, dry, and intact until follow-up appointment

## 2024-10-18 NOTE — RESIDENT HANDOFF
"Handoff     Primary Team: [facility]AM 2 Room Number: 7095/7095 A     Patient Name: Indio Ryder Jr. MRN: [id number]     Date of Birth: [id number] Allergies: Patient has no known allergies.     Age: 54 y.o. Admit Date: 10/16/2024     Sex: male  BMI: Body mass index is 28.36 kg/m².     Code Status: Full Code        Illness Level (current clinical status): Watcher - No    Reason for Admission: DKA (diabetic ketoacidosis)    Brief HPI (pertinent PMH and diagnosis or differential diagnosis): 53yo M w ho insulin dependent T2DM, colon adenocarcinoma s/p resection in 2022, and left foot OM presenting to [facility] [place] w AMS and reports of hematemesis (found next to "a bucket of blood"). Found to have a glucose of 375, HCO3 of 8, AG 27, BHB 4.3, pH 7.17. Admitted to MICU for management of DKA and possible GI Bleed       Hospital Course (updated, brief assessment by system or problem, significant events): DKA resolved with insulin gtt and fluids per DKA protocol. Left on insulin 10/17 despite improvement in glucose and closure of anion gap as patient was not tolerating po 2/2 lethargy and fatigue. Insulin discontinued 10/18.  No witnessed episodes of hematemesis, hgb stable. GI evaluated, no role for intervention, signed off.   Left foot X-ray concerning for acute OM. Podiatry evaluated, wound debrided at bedside. No further surgery planned. Blood cultures with GNRs. Of note, patient recently prescribed a course of cipro outpatient for pseudomonas from the wound culture, however he did not /start. On cefepime while inpatient.      Tasks (specific, using if-then statements):   - Pt with hypernatremia. Encouraging free water intake. If not improving, consider D5W +/- resumption of insulin gtt  - Continue cefepime for Klebsiella bacteremia and left foot OM. Repeat cultures pending   - Refer to podiatry for specific discharge instructions when applicable  - Watch for further hematemesis. Consider discontinuing " PPI if does not recur.   - Monitor improving TAHIR    Contingency Plan (special circumstances anticipated and plan): If worsening mentation with concern for airway protection or worsening acidosis, please call     Estimated Discharge Date: TBD    Discharge Disposition: Home or Self Care    Mentored By: Dr Snyder

## 2024-10-18 NOTE — PROGRESS NOTES
VANCOMYCIN DOSING BY PHARMACY DISCONTINUATION NOTE    Indio Ryder Jr. is a 54 y.o. male who had been consulted for vancomycin dosing.    The pharmacy consult for vancomycin dosing has been discontinued.     Vancomycin Dosing by Pharmacy Consult will sign-off. Please reconsult if necessary. Thank you for allowing us to participate in this patient's care.     Renato Breaux Pharm.D.  [phone]

## 2024-10-19 LAB
ANION GAP SERPL CALC-SCNC: 15 MMOL/L (ref 8–16)
B-OH-BUTYR BLD STRIP-SCNC: 2.3 MMOL/L (ref 0–0.5)
BASOPHILS # BLD AUTO: 0.03 K/UL (ref 0–0.2)
BASOPHILS NFR BLD: 0.5 % (ref 0–1.9)
BUN SERPL-MCNC: 11 MG/DL (ref 6–20)
CALCIUM SERPL-MCNC: 9.5 MG/DL (ref 8.7–10.5)
CHLORIDE SERPL-SCNC: 106 MMOL/L (ref 95–110)
CO2 SERPL-SCNC: 20 MMOL/L (ref 23–29)
CREAT SERPL-MCNC: 1.2 MG/DL (ref 0.5–1.4)
DIFFERENTIAL METHOD BLD: ABNORMAL
EOSINOPHIL # BLD AUTO: 0 K/UL (ref 0–0.5)
EOSINOPHIL NFR BLD: 0.2 % (ref 0–8)
ERYTHROCYTE [DISTWIDTH] IN BLOOD BY AUTOMATED COUNT: 14.6 % (ref 11.5–14.5)
EST. GFR  (NO RACE VARIABLE): >60 ML/MIN/1.73 M^2
GLUCOSE SERPL-MCNC: 125 MG/DL (ref 70–110)
HCT VFR BLD AUTO: 33.9 % (ref 40–54)
HGB BLD-MCNC: 10.2 G/DL (ref 14–18)
IMM GRANULOCYTES # BLD AUTO: 0.03 K/UL (ref 0–0.04)
IMM GRANULOCYTES NFR BLD AUTO: 0.5 % (ref 0–0.5)
LYMPHOCYTES # BLD AUTO: 1.3 K/UL (ref 1–4.8)
LYMPHOCYTES NFR BLD: 23.7 % (ref 18–48)
MAGNESIUM SERPL-MCNC: 1.7 MG/DL (ref 1.6–2.6)
MCH RBC QN AUTO: 27.7 PG (ref 27–31)
MCHC RBC AUTO-ENTMCNC: 30.1 G/DL (ref 32–36)
MCV RBC AUTO: 92 FL (ref 82–98)
MONOCYTES # BLD AUTO: 0.7 K/UL (ref 0.3–1)
MONOCYTES NFR BLD: 12.4 % (ref 4–15)
NEUTROPHILS # BLD AUTO: 3.4 K/UL (ref 1.8–7.7)
NEUTROPHILS NFR BLD: 62.7 % (ref 38–73)
NRBC BLD-RTO: 0 /100 WBC
PHOSPHATE SERPL-MCNC: 2.5 MG/DL (ref 2.7–4.5)
PLATELET # BLD AUTO: 286 K/UL (ref 150–450)
PMV BLD AUTO: 9.5 FL (ref 9.2–12.9)
POCT GLUCOSE: 132 MG/DL (ref 70–110)
POCT GLUCOSE: 174 MG/DL (ref 70–110)
POCT GLUCOSE: 182 MG/DL (ref 70–110)
POCT GLUCOSE: 223 MG/DL (ref 70–110)
POTASSIUM SERPL-SCNC: 3 MMOL/L (ref 3.5–5.1)
RBC # BLD AUTO: 3.68 M/UL (ref 4.6–6.2)
SODIUM SERPL-SCNC: 141 MMOL/L (ref 136–145)
WBC # BLD AUTO: 5.49 K/UL (ref 3.9–12.7)

## 2024-10-19 PROCEDURE — 63600175 PHARM REV CODE 636 W HCPCS: Mod: HCNC

## 2024-10-19 PROCEDURE — 20600001 HC STEP DOWN PRIVATE ROOM: Mod: HCNC

## 2024-10-19 PROCEDURE — 36415 COLL VENOUS BLD VENIPUNCTURE: CPT | Mod: HCNC

## 2024-10-19 PROCEDURE — 25000003 PHARM REV CODE 250: Mod: HCNC | Performed by: STUDENT IN AN ORGANIZED HEALTH CARE EDUCATION/TRAINING PROGRAM

## 2024-10-19 PROCEDURE — 82010 KETONE BODYS QUAN: CPT | Mod: HCNC | Performed by: INTERNAL MEDICINE

## 2024-10-19 PROCEDURE — 83735 ASSAY OF MAGNESIUM: CPT | Mod: HCNC | Performed by: STUDENT IN AN ORGANIZED HEALTH CARE EDUCATION/TRAINING PROGRAM

## 2024-10-19 PROCEDURE — 84100 ASSAY OF PHOSPHORUS: CPT | Mod: HCNC

## 2024-10-19 PROCEDURE — 80048 BASIC METABOLIC PNL TOTAL CA: CPT | Mod: HCNC | Performed by: STUDENT IN AN ORGANIZED HEALTH CARE EDUCATION/TRAINING PROGRAM

## 2024-10-19 PROCEDURE — 63600175 PHARM REV CODE 636 W HCPCS: Mod: HCNC | Performed by: STUDENT IN AN ORGANIZED HEALTH CARE EDUCATION/TRAINING PROGRAM

## 2024-10-19 PROCEDURE — 85025 COMPLETE CBC W/AUTO DIFF WBC: CPT | Mod: HCNC | Performed by: STUDENT IN AN ORGANIZED HEALTH CARE EDUCATION/TRAINING PROGRAM

## 2024-10-19 RX ORDER — POTASSIUM CHLORIDE 20 MEQ/1
40 TABLET, EXTENDED RELEASE ORAL ONCE
Status: COMPLETED | OUTPATIENT
Start: 2024-10-19 | End: 2024-10-19

## 2024-10-19 RX ORDER — PANTOPRAZOLE SODIUM 40 MG/1
40 TABLET, DELAYED RELEASE ORAL DAILY
Status: DISCONTINUED | OUTPATIENT
Start: 2024-10-19 | End: 2024-10-22 | Stop reason: HOSPADM

## 2024-10-19 RX ORDER — CHLORPROMAZINE HYDROCHLORIDE 25 MG/1
25 TABLET, FILM COATED ORAL 4 TIMES DAILY PRN
Status: DISCONTINUED | OUTPATIENT
Start: 2024-10-19 | End: 2024-10-22 | Stop reason: HOSPADM

## 2024-10-19 RX ADMIN — PANTOPRAZOLE SODIUM 40 MG: 40 TABLET, DELAYED RELEASE ORAL at 09:10

## 2024-10-19 RX ADMIN — POTASSIUM CHLORIDE 40 MEQ: 1500 TABLET, EXTENDED RELEASE ORAL at 09:10

## 2024-10-19 RX ADMIN — CEFEPIME 2 G: 2 INJECTION, POWDER, FOR SOLUTION INTRAVENOUS at 08:10

## 2024-10-19 RX ADMIN — INSULIN ASPART 2 UNITS: 100 INJECTION, SOLUTION INTRAVENOUS; SUBCUTANEOUS at 08:10

## 2024-10-19 RX ADMIN — DIBASIC SODIUM PHOSPHATE, MONOBASIC POTASSIUM PHOSPHATE AND MONOBASIC SODIUM PHOSPHATE 1 TABLET: 852; 155; 130 TABLET ORAL at 09:10

## 2024-10-19 RX ADMIN — HEPARIN SODIUM 5000 UNITS: 5000 INJECTION INTRAVENOUS; SUBCUTANEOUS at 05:10

## 2024-10-19 RX ADMIN — INSULIN ASPART 2 UNITS: 100 INJECTION, SOLUTION INTRAVENOUS; SUBCUTANEOUS at 01:10

## 2024-10-19 RX ADMIN — CEFEPIME 2 G: 2 INJECTION, POWDER, FOR SOLUTION INTRAVENOUS at 01:10

## 2024-10-19 RX ADMIN — INSULIN GLARGINE 26 UNITS: 100 INJECTION, SOLUTION SUBCUTANEOUS at 08:10

## 2024-10-19 RX ADMIN — HEPARIN SODIUM 5000 UNITS: 5000 INJECTION INTRAVENOUS; SUBCUTANEOUS at 01:10

## 2024-10-19 RX ADMIN — CHLORPROMAZINE HYDROCHLORIDE 25 MG: 25 TABLET, FILM COATED ORAL at 12:10

## 2024-10-19 RX ADMIN — CEFEPIME 2 G: 2 INJECTION, POWDER, FOR SOLUTION INTRAVENOUS at 05:10

## 2024-10-19 RX ADMIN — HEPARIN SODIUM 5000 UNITS: 5000 INJECTION INTRAVENOUS; SUBCUTANEOUS at 09:10

## 2024-10-19 RX ADMIN — INSULIN ASPART 2 UNITS: 100 INJECTION, SOLUTION INTRAVENOUS; SUBCUTANEOUS at 06:10

## 2024-10-19 RX ADMIN — CHLORPROMAZINE HYDROCHLORIDE 25 MG: 25 TABLET, FILM COATED ORAL at 08:10

## 2024-10-19 NOTE — ASSESSMENT & PLAN NOTE
-seen and evaluated by GI, no plans for scope   -suspected valorie steve tear vs vomiting old blood products from dental procedure   -will de escalate to PO PPI  - Will trend hemoglobin/hematocrit Daily  - Will monitor and correct any coagulation defects  - Will transfuse if Hgb is <7g/dl (<8g/dl in cases of active ACS) or if patient has rapid bleeding leading to hemodynamic instability

## 2024-10-19 NOTE — PROGRESS NOTES
[facility] ([address] Medicine  Progress Note    Patient Name: Indio Ryder Jr.  MRN: [id number]  Patient Class: IP- Inpatient   Admission Date: 10/16/2024  Length of Stay: 3 days  Attending Physician: Roxi Pack MD  Primary Care Provider: Lorena Thakur MD        Subjective:     Principal Problem:DKA (diabetic ketoacidosis)        HPI:  No notes on file    Overview/Hospital Course:  No notes on file    Interval History: Patient seen this AM. He is doing well. Has no acute complaints at this time.       Objective:     Vital Signs (Most Recent):  Temp: 97.8 °F (36.6 °C) (10/19/24 1149)  Pulse: 104 (10/19/24 1149)  Resp: 20 (10/19/24 1149)  BP: 137/77 (10/19/24 1149)  SpO2: 99 % (10/19/24 1300) Vital Signs (24h Range):  Temp:  [97.8 °F (36.6 °C)-99 °F (37.2 °C)] 97.8 °F (36.6 °C)  Pulse:  [] 104  Resp:  [12-21] 20  SpO2:  [94 %-99 %] 99 %  BP: (118-154)/(68-90) 137/77     Weight: 97.5 kg (214 lb 15.2 oz)  Body mass index is 28.36 kg/m².    Intake/Output Summary (Last 24 hours) at 10/19/2024 1313  Last data filed at 10/19/2024 0916  Gross per 24 hour   Intake --   Output 1500 ml   Net -1500 ml         Physical Exam  Constitutional:       Appearance: Normal appearance.   HENT:      Head: Normocephalic and atraumatic.   Eyes:      Pupils: Pupils are equal, round, and reactive to light.   Cardiovascular:      Rate and Rhythm: Normal rate and regular rhythm.   Pulmonary:      Effort: Pulmonary effort is normal.      Breath sounds: Normal breath sounds.   Abdominal:      General: Abdomen is flat.      Palpations: Abdomen is soft.   Musculoskeletal:         General: Normal range of motion.      Cervical back: Normal range of motion.   Skin:     General: Skin is warm and dry.      Comments: Extremity wrapped/bandaged    Neurological:      General: No focal deficit present.      Mental Status: He is alert.   Psychiatric:         Mood and Affect: Mood normal.              Significant Labs: All pertinent labs within the past 24 hours have been reviewed.    Significant Imaging: I have reviewed all pertinent imaging results/findings within the past 24 hours.    Assessment/Plan:      * DKA (diabetic ketoacidosis)  -likely 2/2 to acute infection   -s/p insulin gtt and fluids in the ICU   -AG has resolved and pt tolerating PO   -continue Lantus 26 units and SSI   -continue monitor glucose achs       Severe sepsis  -2/2 to bacteremia and Acute on Chronic OM of the extremity   -with SIRS, source of infection, TAHIR and Encephalopathy met criteria for severe sepsis   -On IV Cefepime q8h for Klebsiella-Sensitivities pending   -Previous wound cultures of the foot positive for Pseudomonas (had not started Cipro outpatient)   -improving              Chronic osteomyelitis of left foot  -previous cultures prior to admission with Podiatry clinic growing Pseudomonas   -s/p Debridement with Podiatry, no plans for acute surgical intervention   -continue IV Cefepime at this time   -wound care daily   -will arrange outpatient wound care dressing changes closer to time of discharge       Type 2 diabetes mellitus with hyperglycemia, with long-term current use of insulin  -BG appears better controlled   - Working to optimize BG control  - Lantus 26 units   - SSI provided for corrective dosing  - POCT glucose checks as ordered  - Hypoglycemic protocol in effect  - Diabetic diet provided      Encephalopathy, metabolic  -likely due to DKA and multiple infections   -resolved, continue to monitor         TAHIR (acute kidney injury)  -resolved with IVFs and treatment of DKA   -continue to monitor on BMPs daily  -hold nephrotoxic meds      Hematemesis  -seen and evaluated by GI, no plans for scope   -suspected valorie steve tear vs vomiting old blood products from dental procedure   -will de escalate to PO PPI  - Will trend hemoglobin/hematocrit Daily  - Will monitor and correct any coagulation defects  - Will  transfuse if Hgb is <7g/dl (<8g/dl in cases of active ACS) or if patient has rapid bleeding leading to hemodynamic instability    Hypernatremia  -resolved with IVF, stable off fluids   -continue to monitor with BMP       VTE Risk Mitigation (From admission, onward)           Ordered     heparin (porcine) injection 5,000 Units  Every 8 hours         10/17/24 1612     Place sequential compression device  Until discontinued         10/16/24 1214     IP VTE HIGH RISK PATIENT  Once         10/16/24 1212     Place sequential compression device  Until discontinued         10/16/24 1212     Place sequential compression device  Until discontinued         10/16/24 1200                    Discharge Planning   FRANCISCO: 10/22/2024     Code Status: Full Code   Is the patient medically ready for discharge?:     Reason for patient still in hospital (select all that apply): Treatment and Consult recommendations  Discharge Plan A: Home Health, Home with family                  Roxi Pack MD  Department of Hospital Medicine   [facility] ([place])

## 2024-10-19 NOTE — PLAN OF CARE
Problem: Adult Inpatient Plan of Care  Goal: Plan of Care Review  Outcome: Progressing  Goal: Patient-Specific Goal (Individualized)  Outcome: Progressing  Goal: Absence of Hospital-Acquired Illness or Injury  Outcome: Progressing  Goal: Optimal Comfort and Wellbeing  Outcome: Progressing  Goal: Readiness for Transition of Care  Outcome: Progressing     Problem: Infection  Goal: Absence of Infection Signs and Symptoms  Outcome: Progressing     Problem: Diabetes Comorbidity  Goal: Blood Glucose Level Within Targeted Range  Outcome: Progressing     Problem: Acute Kidney Injury/Impairment  Goal: Fluid and Electrolyte Balance  Outcome: Progressing  Goal: Improved Oral Intake  Outcome: Progressing  Goal: Effective Renal Function  Outcome: Progressing     Problem: Wound  Goal: Optimal Coping  Outcome: Progressing  Goal: Optimal Functional Ability  Outcome: Progressing  Goal: Absence of Infection Signs and Symptoms  Outcome: Progressing  Goal: Improved Oral Intake  Outcome: Progressing  Goal: Optimal Pain Control and Function  Outcome: Progressing  Goal: Skin Health and Integrity  Outcome: Progressing  Goal: Optimal Wound Healing  Outcome: Progressing     Problem: Sepsis/Septic Shock  Goal: Optimal Coping  Outcome: Progressing  Goal: Absence of Bleeding  Outcome: Progressing  Goal: Blood Glucose Level Within Targeted Range  Outcome: Progressing  Goal: Absence of Infection Signs and Symptoms  Outcome: Progressing  Goal: Optimal Nutrition Intake  Outcome: Progressing

## 2024-10-19 NOTE — ASSESSMENT & PLAN NOTE
-likely 2/2 to acute infection   -s/p insulin gtt and fluids in the ICU   -AG has resolved and pt tolerating PO   -continue Lantus 26 units and SSI   -continue monitor glucose achs

## 2024-10-19 NOTE — ASSESSMENT & PLAN NOTE
-resolved with IVFs and treatment of DKA   -continue to monitor on BMPs daily  -hold nephrotoxic meds

## 2024-10-19 NOTE — SUBJECTIVE & OBJECTIVE
Interval History: Patient seen this AM. He is doing well. Has no acute complaints at this time.       Objective:     Vital Signs (Most Recent):  Temp: 97.8 °F (36.6 °C) (10/19/24 1149)  Pulse: 104 (10/19/24 1149)  Resp: 20 (10/19/24 1149)  BP: 137/77 (10/19/24 1149)  SpO2: 99 % (10/19/24 1300) Vital Signs (24h Range):  Temp:  [97.8 °F (36.6 °C)-99 °F (37.2 °C)] 97.8 °F (36.6 °C)  Pulse:  [] 104  Resp:  [12-21] 20  SpO2:  [94 %-99 %] 99 %  BP: (118-154)/(68-90) 137/77     Weight: 97.5 kg (214 lb 15.2 oz)  Body mass index is 28.36 kg/m².    Intake/Output Summary (Last 24 hours) at 10/19/2024 1313  Last data filed at 10/19/2024 0916  Gross per 24 hour   Intake --   Output 1500 ml   Net -1500 ml         Physical Exam  Constitutional:       Appearance: Normal appearance.   HENT:      Head: Normocephalic and atraumatic.   Eyes:      Pupils: Pupils are equal, round, and reactive to light.   Cardiovascular:      Rate and Rhythm: Normal rate and regular rhythm.   Pulmonary:      Effort: Pulmonary effort is normal.      Breath sounds: Normal breath sounds.   Abdominal:      General: Abdomen is flat.      Palpations: Abdomen is soft.   Musculoskeletal:         General: Normal range of motion.      Cervical back: Normal range of motion.   Skin:     General: Skin is warm and dry.      Comments: Extremity wrapped/bandaged    Neurological:      General: No focal deficit present.      Mental Status: He is alert.   Psychiatric:         Mood and Affect: Mood normal.             Significant Labs: All pertinent labs within the past 24 hours have been reviewed.    Significant Imaging: I have reviewed all pertinent imaging results/findings within the past 24 hours.

## 2024-10-19 NOTE — NURSING
Pt AAO, VSS, no c/o pain or distress. L foot drsg c/d/I not due to be changed.  Pt got up to shower today and sat in chair throughout shift.  Pt c/o hiccups early in shift. Dr. Pack notified, Thorazine ordered PRN.  Replaced pt K and Phos today.

## 2024-10-19 NOTE — ASSESSMENT & PLAN NOTE
-2/2 to bacteremia and Acute on Chronic OM of the extremity   -with SIRS, source of infection, TAHIR and Encephalopathy met criteria for severe sepsis   -On IV Cefepime q8h for Klebsiella-Sensitivities pending   -Previous wound cultures of the foot positive for Pseudomonas (had not started Cipro outpatient)   -improving

## 2024-10-19 NOTE — ASSESSMENT & PLAN NOTE
-BG appears better controlled   - Working to optimize BG control  - Lantus 26 units   - SSI provided for corrective dosing  - POCT glucose checks as ordered  - Hypoglycemic protocol in effect  - Diabetic diet provided

## 2024-10-19 NOTE — ASSESSMENT & PLAN NOTE
-previous cultures prior to admission with Podiatry clinic growing Pseudomonas   -s/p Debridement with Podiatry, no plans for acute surgical intervention   -continue IV Cefepime at this time   -wound care daily   -will arrange outpatient wound care dressing changes closer to time of discharge

## 2024-10-20 PROBLEM — R78.81 BACTEREMIA: Status: ACTIVE | Noted: 2024-10-20

## 2024-10-20 LAB
ANION GAP SERPL CALC-SCNC: 12 MMOL/L (ref 8–16)
BACTERIA BLD CULT: ABNORMAL
BASOPHILS # BLD AUTO: 0.02 K/UL (ref 0–0.2)
BASOPHILS NFR BLD: 0.4 % (ref 0–1.9)
BLD PROD TYP BPU: NORMAL
BLOOD UNIT EXPIRATION DATE: NORMAL
BLOOD UNIT TYPE CODE: 6200
BLOOD UNIT TYPE: NORMAL
BUN SERPL-MCNC: 15 MG/DL (ref 6–20)
CALCIUM SERPL-MCNC: 10.3 MG/DL (ref 8.7–10.5)
CHLORIDE SERPL-SCNC: 104 MMOL/L (ref 95–110)
CO2 SERPL-SCNC: 27 MMOL/L (ref 23–29)
CODING SYSTEM: NORMAL
CREAT SERPL-MCNC: 1.4 MG/DL (ref 0.5–1.4)
CROSSMATCH INTERPRETATION: NORMAL
DIFFERENTIAL METHOD BLD: ABNORMAL
DISPENSE STATUS: NORMAL
EOSINOPHIL # BLD AUTO: 0 K/UL (ref 0–0.5)
EOSINOPHIL NFR BLD: 0.7 % (ref 0–8)
ERYTHROCYTE [DISTWIDTH] IN BLOOD BY AUTOMATED COUNT: 14.6 % (ref 11.5–14.5)
EST. GFR  (NO RACE VARIABLE): 59 ML/MIN/1.73 M^2
GLUCOSE SERPL-MCNC: 123 MG/DL (ref 70–110)
HCT VFR BLD AUTO: 32.3 % (ref 40–54)
HGB BLD-MCNC: 10.1 G/DL (ref 14–18)
IMM GRANULOCYTES # BLD AUTO: 0.02 K/UL (ref 0–0.04)
IMM GRANULOCYTES NFR BLD AUTO: 0.4 % (ref 0–0.5)
LYMPHOCYTES # BLD AUTO: 1.7 K/UL (ref 1–4.8)
LYMPHOCYTES NFR BLD: 30.2 % (ref 18–48)
MAGNESIUM SERPL-MCNC: 2 MG/DL (ref 1.6–2.6)
MCH RBC QN AUTO: 28.1 PG (ref 27–31)
MCHC RBC AUTO-ENTMCNC: 31.3 G/DL (ref 32–36)
MCV RBC AUTO: 90 FL (ref 82–98)
MONOCYTES # BLD AUTO: 0.6 K/UL (ref 0.3–1)
MONOCYTES NFR BLD: 10.2 % (ref 4–15)
NEUTROPHILS # BLD AUTO: 3.3 K/UL (ref 1.8–7.7)
NEUTROPHILS NFR BLD: 58.1 % (ref 38–73)
NRBC BLD-RTO: 0 /100 WBC
NUM UNITS TRANS PACKED RBC: NORMAL
PHOSPHATE SERPL-MCNC: 2.9 MG/DL (ref 2.7–4.5)
PLATELET # BLD AUTO: 333 K/UL (ref 150–450)
PMV BLD AUTO: 10 FL (ref 9.2–12.9)
POCT GLUCOSE: 107 MG/DL (ref 70–110)
POCT GLUCOSE: 141 MG/DL (ref 70–110)
POCT GLUCOSE: 152 MG/DL (ref 70–110)
POCT GLUCOSE: 195 MG/DL (ref 70–110)
POTASSIUM SERPL-SCNC: 3.7 MMOL/L (ref 3.5–5.1)
RBC # BLD AUTO: 3.59 M/UL (ref 4.6–6.2)
SODIUM SERPL-SCNC: 143 MMOL/L (ref 136–145)
WBC # BLD AUTO: 5.66 K/UL (ref 3.9–12.7)

## 2024-10-20 PROCEDURE — 63600175 PHARM REV CODE 636 W HCPCS: Mod: HCNC | Performed by: STUDENT IN AN ORGANIZED HEALTH CARE EDUCATION/TRAINING PROGRAM

## 2024-10-20 PROCEDURE — 84100 ASSAY OF PHOSPHORUS: CPT | Mod: HCNC

## 2024-10-20 PROCEDURE — 0QBM0ZZ EXCISION OF LEFT TARSAL, OPEN APPROACH: ICD-10-PCS | Performed by: PODIATRIST

## 2024-10-20 PROCEDURE — 63600175 PHARM REV CODE 636 W HCPCS: Mod: HCNC

## 2024-10-20 PROCEDURE — 36415 COLL VENOUS BLD VENIPUNCTURE: CPT | Mod: HCNC

## 2024-10-20 PROCEDURE — 80048 BASIC METABOLIC PNL TOTAL CA: CPT | Mod: HCNC | Performed by: STUDENT IN AN ORGANIZED HEALTH CARE EDUCATION/TRAINING PROGRAM

## 2024-10-20 PROCEDURE — 25000003 PHARM REV CODE 250: Mod: HCNC | Performed by: STUDENT IN AN ORGANIZED HEALTH CARE EDUCATION/TRAINING PROGRAM

## 2024-10-20 PROCEDURE — 83735 ASSAY OF MAGNESIUM: CPT | Mod: HCNC | Performed by: STUDENT IN AN ORGANIZED HEALTH CARE EDUCATION/TRAINING PROGRAM

## 2024-10-20 PROCEDURE — 85025 COMPLETE CBC W/AUTO DIFF WBC: CPT | Mod: HCNC | Performed by: STUDENT IN AN ORGANIZED HEALTH CARE EDUCATION/TRAINING PROGRAM

## 2024-10-20 PROCEDURE — 20600001 HC STEP DOWN PRIVATE ROOM: Mod: HCNC

## 2024-10-20 RX ORDER — POTASSIUM CHLORIDE 20 MEQ/1
20 TABLET, EXTENDED RELEASE ORAL ONCE
Status: COMPLETED | OUTPATIENT
Start: 2024-10-20 | End: 2024-10-20

## 2024-10-20 RX ADMIN — HEPARIN SODIUM 5000 UNITS: 5000 INJECTION INTRAVENOUS; SUBCUTANEOUS at 09:10

## 2024-10-20 RX ADMIN — PANTOPRAZOLE SODIUM 40 MG: 40 TABLET, DELAYED RELEASE ORAL at 08:10

## 2024-10-20 RX ADMIN — POTASSIUM CHLORIDE 20 MEQ: 1500 TABLET, EXTENDED RELEASE ORAL at 08:10

## 2024-10-20 RX ADMIN — INSULIN ASPART 1 UNITS: 100 INJECTION, SOLUTION INTRAVENOUS; SUBCUTANEOUS at 08:10

## 2024-10-20 RX ADMIN — CEFEPIME 2 G: 2 INJECTION, POWDER, FOR SOLUTION INTRAVENOUS at 05:10

## 2024-10-20 RX ADMIN — HEPARIN SODIUM 5000 UNITS: 5000 INJECTION INTRAVENOUS; SUBCUTANEOUS at 01:10

## 2024-10-20 RX ADMIN — CEFEPIME 2 G: 2 INJECTION, POWDER, FOR SOLUTION INTRAVENOUS at 08:10

## 2024-10-20 RX ADMIN — INSULIN GLARGINE 26 UNITS: 100 INJECTION, SOLUTION SUBCUTANEOUS at 08:10

## 2024-10-20 RX ADMIN — INSULIN ASPART 2 UNITS: 100 INJECTION, SOLUTION INTRAVENOUS; SUBCUTANEOUS at 05:10

## 2024-10-20 RX ADMIN — HEPARIN SODIUM 5000 UNITS: 5000 INJECTION INTRAVENOUS; SUBCUTANEOUS at 05:10

## 2024-10-20 RX ADMIN — CEFEPIME 2 G: 2 INJECTION, POWDER, FOR SOLUTION INTRAVENOUS at 01:10

## 2024-10-20 NOTE — ASSESSMENT & PLAN NOTE
-previous cultures prior to admission with Podiatry clinic growing Pseudomonas   -s/p Debridement with Podiatry, no plans for acute surgical intervention   -noted in the chart patient recently completely IV Daptomycin treatment for OM and has had multiple repeated infections   -continue IV Cefepime at this time   -wound care daily   -will arrange outpatient wound care dressing changes closer to time of discharge

## 2024-10-20 NOTE — ASSESSMENT & PLAN NOTE
-severe sepsis likely due to Klebsiella Aerogenes and Pseudomonas Bacteremia as well acute on chronic OM   -continue IV Cefepime q8h while susceptibilities are pending   -repeat BC NGTD, pt improving on current regimen   -will de escalate once susceptiblities have resulted

## 2024-10-20 NOTE — ASSESSMENT & PLAN NOTE
-2/2 to bacteremia and Acute on Chronic OM of the extremity   -with SIRS, source of infection, TAHIR and Encephalopathy met criteria for severe sepsis   -On IV Cefepime q8h for Klebsiella and Pseudomonas-Sensitivities pending   -Previous wound cultures of the foot positive for Pseudomonas (had not started Cipro outpatient)   -improving

## 2024-10-20 NOTE — SUBJECTIVE & OBJECTIVE
Interval History: Pt seen and examined this AM. DWAYNE. Hiccups have improved.    Review of Systems  Objective:     Vital Signs (Most Recent):  Temp: 97.8 °F (36.6 °C) (10/20/24 0413)  Pulse: 94 (10/20/24 0413)  Resp: 12 (10/20/24 0413)  BP: 111/72 (10/20/24 0413)  SpO2: 96 % (10/20/24 0413) Vital Signs (24h Range):  Temp:  [97.7 °F (36.5 °C)-98.2 °F (36.8 °C)] 97.8 °F (36.6 °C)  Pulse:  [] 94  Resp:  [12-20] 12  SpO2:  [96 %-99 %] 96 %  BP: (111-137)/(67-83) 111/72     Weight: 97.5 kg (214 lb 15.2 oz)  Body mass index is 28.36 kg/m².    Intake/Output Summary (Last 24 hours) at 10/20/2024 0736  Last data filed at 10/20/2024 0327  Gross per 24 hour   Intake 200 ml   Output 1150 ml   Net -950 ml         Physical Exam  Constitutional:       General: He is not in acute distress.     Appearance: Normal appearance. He is not toxic-appearing.   HENT:      Head: Normocephalic and atraumatic.   Eyes:      Pupils: Pupils are equal, round, and reactive to light.   Cardiovascular:      Rate and Rhythm: Normal rate and regular rhythm.   Pulmonary:      Effort: Pulmonary effort is normal.      Breath sounds: Normal breath sounds.   Abdominal:      General: Abdomen is flat.      Palpations: Abdomen is soft.   Musculoskeletal:         General: Normal range of motion.      Cervical back: Normal range of motion.   Skin:     General: Skin is warm and dry.      Comments: Extremity wrapped/bandaged    Neurological:      General: No focal deficit present.   Psychiatric:         Mood and Affect: Mood normal.             Significant Labs: All pertinent labs within the past 24 hours have been reviewed.    Significant Imaging: I have reviewed all pertinent imaging results/findings within the past 24 hours.

## 2024-10-20 NOTE — ASSESSMENT & PLAN NOTE
-seen and evaluated by GI, no plans for scope   -suspected valorie steve tear vs vomiting old blood products from dental procedure   de escalate to PO PPI  - Will trend hemoglobin/hematocrit Daily  - Will monitor and correct any coagulation defects  - Will transfuse if Hgb is <7g/dl (<8g/dl in cases of active ACS) or if patient has rapid bleeding leading to hemodynamic instability

## 2024-10-20 NOTE — ASSESSMENT & PLAN NOTE
-severe sepsis likely due to Klebsiella Aerogenes Bacteremia as well acute on chronic OM   -continue IV Cefepime q8h while susceptibilities are pending   -repeat BC NGTD, pt improving on current regimen   -will de escalate once susceptiblities have resulted

## 2024-10-20 NOTE — PROGRESS NOTES
[facility] ([address] Medicine  Progress Note    Patient Name: Indio Ryder Jr.  MRN: [id number]  Patient Class: IP- Inpatient   Admission Date: 10/16/2024  Length of Stay: 4 days  Attending Physician: Roxi Pack MD  Primary Care Provider: Lorena Thakur MD        Subjective:     Principal Problem:DKA (diabetic ketoacidosis)        HPI:  No notes on file    Overview/Hospital Course:  No notes on file    Pt seen and examined this AM. NAEO. Hiccups have improved.     Assessment/Plan:      * DKA (diabetic ketoacidosis)  -likely 2/2 to acute infection   -s/p insulin gtt and fluids in the ICU   -AG has resolved and pt tolerating PO   -continue Lantus 26 units and SSI   -continue monitor glucose achs       Bacteremia  -severe sepsis likely due to Klebsiella Aerogenes and Pseudomonas Bacteremia as well acute on chronic OM   -continue IV Cefepime q8h while susceptibilities are pending   -repeat BC NGTD, pt improving on current regimen   -will de escalate once susceptiblities have resulted           Chronic osteomyelitis of left foot  -previous cultures prior to admission with Podiatry clinic growing Pseudomonas   -s/p Debridement with Podiatry, no plans for acute surgical intervention   -noted in the chart patient recently completely IV Daptomycin treatment for OM and has had multiple repeated infections   -continue IV Cefepime at this time   -wound care daily   -will arrange outpatient wound care dressing changes closer to time of discharge       Severe sepsis  -2/2 to bacteremia and Acute on Chronic OM of the extremity   -with SIRS, source of infection, TAHIR and Encephalopathy met criteria for severe sepsis   -On IV Cefepime q8h for Klebsiella and Pseudomonas-Sensitivities pending   -Previous wound cultures of the foot positive for Pseudomonas (had not started Cipro outpatient)   -improving              Type 2 diabetes mellitus with hyperglycemia, with long-term current use of  insulin  -BG appears better controlled   - Working to optimize BG control  - Lantus 26 units   - SSI provided for corrective dosing  - POCT glucose checks as ordered  - Hypoglycemic protocol in effect  - Diabetic diet provided      Encephalopathy, metabolic  -likely due to DKA and multiple infections   -resolved, continue to monitor         TAHIR (acute kidney injury)  -resolved with IVFs and treatment of DKA   -continue to monitor on BMPs daily  -hold nephrotoxic meds      Hematemesis  -seen and evaluated by GI, no plans for scope   -suspected valorie steve tear vs vomiting old blood products from dental procedure   de escalate to PO PPI  - Will trend hemoglobin/hematocrit Daily  - Will monitor and correct any coagulation defects  - Will transfuse if Hgb is <7g/dl (<8g/dl in cases of active ACS) or if patient has rapid bleeding leading to hemodynamic instability    Hypernatremia  -resolved with IVF, stable off fluids   -continue to monitor with BMP       VTE Risk Mitigation (From admission, onward)           Ordered     heparin (porcine) injection 5,000 Units  Every 8 hours         10/17/24 1612     Place sequential compression device  Until discontinued         10/16/24 1214     IP VTE HIGH RISK PATIENT  Once         10/16/24 1212                    Discharge Planning   FRANCISCO: 10/22/2024     Code Status: Full Code   Is the patient medically ready for discharge?:     Reason for patient still in hospital (select all that apply): Treatment and Consult recommendations  Discharge Plan A: Home Health, Home with family                  Roxi Pack MD  Department of Hospital Medicine   [facility] ([place])

## 2024-10-21 PROBLEM — E11.10 DKA (DIABETIC KETOACIDOSIS): Status: RESOLVED | Noted: 2017-05-30 | Resolved: 2024-10-21

## 2024-10-21 PROBLEM — R65.20 SEVERE SEPSIS: Status: RESOLVED | Noted: 2019-07-12 | Resolved: 2024-10-21

## 2024-10-21 PROBLEM — R78.81 BACTEREMIA: Status: RESOLVED | Noted: 2024-10-20 | Resolved: 2024-10-21

## 2024-10-21 PROBLEM — N17.9 AKI (ACUTE KIDNEY INJURY): Status: RESOLVED | Noted: 2017-05-30 | Resolved: 2024-10-21

## 2024-10-21 PROBLEM — K92.0 HEMATEMESIS: Status: RESOLVED | Noted: 2024-10-16 | Resolved: 2024-10-21

## 2024-10-21 PROBLEM — E87.0 HYPERNATREMIA: Status: RESOLVED | Noted: 2024-10-18 | Resolved: 2024-10-21

## 2024-10-21 PROBLEM — A41.9 SEVERE SEPSIS: Status: RESOLVED | Noted: 2019-07-12 | Resolved: 2024-10-21

## 2024-10-21 PROBLEM — G93.41 ENCEPHALOPATHY, METABOLIC: Status: RESOLVED | Noted: 2024-10-17 | Resolved: 2024-10-21

## 2024-10-21 LAB
ANION GAP SERPL CALC-SCNC: 11 MMOL/L (ref 8–16)
BASOPHILS # BLD AUTO: 0.01 K/UL (ref 0–0.2)
BASOPHILS NFR BLD: 0.2 % (ref 0–1.9)
BUN SERPL-MCNC: 12 MG/DL (ref 6–20)
CALCIUM SERPL-MCNC: 9.7 MG/DL (ref 8.7–10.5)
CHLORIDE SERPL-SCNC: 101 MMOL/L (ref 95–110)
CO2 SERPL-SCNC: 27 MMOL/L (ref 23–29)
CREAT SERPL-MCNC: 1.1 MG/DL (ref 0.5–1.4)
DIFFERENTIAL METHOD BLD: ABNORMAL
EOSINOPHIL # BLD AUTO: 0.1 K/UL (ref 0–0.5)
EOSINOPHIL NFR BLD: 0.9 % (ref 0–8)
ERYTHROCYTE [DISTWIDTH] IN BLOOD BY AUTOMATED COUNT: 14.2 % (ref 11.5–14.5)
EST. GFR  (NO RACE VARIABLE): >60 ML/MIN/1.73 M^2
GLUCOSE SERPL-MCNC: 137 MG/DL (ref 70–110)
HCT VFR BLD AUTO: 32.7 % (ref 40–54)
HGB BLD-MCNC: 10.1 G/DL (ref 14–18)
IMM GRANULOCYTES # BLD AUTO: 0.02 K/UL (ref 0–0.04)
IMM GRANULOCYTES NFR BLD AUTO: 0.4 % (ref 0–0.5)
LYMPHOCYTES # BLD AUTO: 1.8 K/UL (ref 1–4.8)
LYMPHOCYTES NFR BLD: 31.4 % (ref 18–48)
MAGNESIUM SERPL-MCNC: 1.8 MG/DL (ref 1.6–2.6)
MCH RBC QN AUTO: 27.8 PG (ref 27–31)
MCHC RBC AUTO-ENTMCNC: 30.9 G/DL (ref 32–36)
MCV RBC AUTO: 90 FL (ref 82–98)
MONOCYTES # BLD AUTO: 0.6 K/UL (ref 0.3–1)
MONOCYTES NFR BLD: 10.2 % (ref 4–15)
NEUTROPHILS # BLD AUTO: 3.2 K/UL (ref 1.8–7.7)
NEUTROPHILS NFR BLD: 56.9 % (ref 38–73)
NRBC BLD-RTO: 0 /100 WBC
PHOSPHATE SERPL-MCNC: 2.3 MG/DL (ref 2.7–4.5)
PLATELET # BLD AUTO: 332 K/UL (ref 150–450)
PMV BLD AUTO: 10 FL (ref 9.2–12.9)
POCT GLUCOSE: 147 MG/DL (ref 70–110)
POCT GLUCOSE: 207 MG/DL (ref 70–110)
POCT GLUCOSE: 222 MG/DL (ref 70–110)
POCT GLUCOSE: 96 MG/DL (ref 70–110)
POTASSIUM SERPL-SCNC: 3.1 MMOL/L (ref 3.5–5.1)
RBC # BLD AUTO: 3.63 M/UL (ref 4.6–6.2)
SODIUM SERPL-SCNC: 139 MMOL/L (ref 136–145)
WBC # BLD AUTO: 5.66 K/UL (ref 3.9–12.7)

## 2024-10-21 PROCEDURE — 99223 1ST HOSP IP/OBS HIGH 75: CPT | Mod: ,,,

## 2024-10-21 PROCEDURE — 97165 OT EVAL LOW COMPLEX 30 MIN: CPT | Mod: HCNC

## 2024-10-21 PROCEDURE — 63600175 PHARM REV CODE 636 W HCPCS: Mod: HCNC

## 2024-10-21 PROCEDURE — 84100 ASSAY OF PHOSPHORUS: CPT | Mod: HCNC

## 2024-10-21 PROCEDURE — 97530 THERAPEUTIC ACTIVITIES: CPT | Mod: HCNC

## 2024-10-21 PROCEDURE — 85025 COMPLETE CBC W/AUTO DIFF WBC: CPT | Mod: HCNC | Performed by: STUDENT IN AN ORGANIZED HEALTH CARE EDUCATION/TRAINING PROGRAM

## 2024-10-21 PROCEDURE — 25000003 PHARM REV CODE 250: Mod: HCNC | Performed by: STUDENT IN AN ORGANIZED HEALTH CARE EDUCATION/TRAINING PROGRAM

## 2024-10-21 PROCEDURE — 20600001 HC STEP DOWN PRIVATE ROOM: Mod: HCNC

## 2024-10-21 PROCEDURE — 99233 SBSQ HOSP IP/OBS HIGH 50: CPT | Mod: HCNC,,, | Performed by: PODIATRIST

## 2024-10-21 PROCEDURE — 63600175 PHARM REV CODE 636 W HCPCS: Mod: HCNC | Performed by: STUDENT IN AN ORGANIZED HEALTH CARE EDUCATION/TRAINING PROGRAM

## 2024-10-21 PROCEDURE — 83735 ASSAY OF MAGNESIUM: CPT | Mod: HCNC | Performed by: STUDENT IN AN ORGANIZED HEALTH CARE EDUCATION/TRAINING PROGRAM

## 2024-10-21 PROCEDURE — 80048 BASIC METABOLIC PNL TOTAL CA: CPT | Mod: HCNC | Performed by: STUDENT IN AN ORGANIZED HEALTH CARE EDUCATION/TRAINING PROGRAM

## 2024-10-21 PROCEDURE — 36415 COLL VENOUS BLD VENIPUNCTURE: CPT | Mod: HCNC

## 2024-10-21 RX ADMIN — DIBASIC SODIUM PHOSPHATE, MONOBASIC POTASSIUM PHOSPHATE AND MONOBASIC SODIUM PHOSPHATE 1 TABLET: 852; 155; 130 TABLET ORAL at 09:10

## 2024-10-21 RX ADMIN — HEPARIN SODIUM 5000 UNITS: 5000 INJECTION INTRAVENOUS; SUBCUTANEOUS at 05:10

## 2024-10-21 RX ADMIN — HEPARIN SODIUM 5000 UNITS: 5000 INJECTION INTRAVENOUS; SUBCUTANEOUS at 02:10

## 2024-10-21 RX ADMIN — HEPARIN SODIUM 5000 UNITS: 5000 INJECTION INTRAVENOUS; SUBCUTANEOUS at 09:10

## 2024-10-21 RX ADMIN — CIPROFOLXACIN 750 MG: 250 TABLET ORAL at 09:10

## 2024-10-21 RX ADMIN — INSULIN GLARGINE 26 UNITS: 100 INJECTION, SOLUTION SUBCUTANEOUS at 09:10

## 2024-10-21 RX ADMIN — CEFEPIME 2 G: 2 INJECTION, POWDER, FOR SOLUTION INTRAVENOUS at 02:10

## 2024-10-21 RX ADMIN — INSULIN ASPART 4 UNITS: 100 INJECTION, SOLUTION INTRAVENOUS; SUBCUTANEOUS at 06:10

## 2024-10-21 RX ADMIN — CEFEPIME 2 G: 2 INJECTION, POWDER, FOR SOLUTION INTRAVENOUS at 05:10

## 2024-10-21 RX ADMIN — INSULIN ASPART 2 UNITS: 100 INJECTION, SOLUTION INTRAVENOUS; SUBCUTANEOUS at 09:10

## 2024-10-21 RX ADMIN — PANTOPRAZOLE SODIUM 40 MG: 40 TABLET, DELAYED RELEASE ORAL at 09:10

## 2024-10-21 NOTE — ASSESSMENT & PLAN NOTE
I have reviewed hospital notes from   service and other specialty providers. I have also reviewed CBC, CMP/BMP,  cultures and imaging with my interpretation as documented.      54 year old male with history of DMT2 (Alc 11% 10/16/2024), colon adenocarcinoma (s/p resection in 2022), HTN, HLD, and numerous episodes of foot osteomyelitis with L foot chronic osteomyelitis. Presented to ER on 10/16/2024 with AMS and hematemesis. Admitted for management of DKA and possible GI bleed. Patient follows closely with podiatry and wound care for chronic osteo. Patient recently completed 8 weeks of IV Dapto for chronic osteo (ended IV abx 9/25/2024). Bone culture and pathology sent from recent podiatry visit (10/11/2024). Bone culture of L foot (10/11/2024) grew Pseudomonas aeruginosa. Pathology confirmed acute osteo. Bcx (10/16/2024) grew Pseudomonas aeruginosa and Klebsiella aerogenes  (sens to Cefepime, only deemed  due to intermediate to Imipenem). Patient started on IV Cefepime. Left foot X-ray (10/18/2024) notable for acute OM of the 3rd metatarsal base, cuboid, and lateral cuneiform. Podiatry consulted, L foot debrided at bedside (10/17/2024), and no role for surgical intervention.  ID consulted for final antibiotic recs for acute osteomyelitis and bacteremia.    Recommendations / Plan:  May transition to PO Cipro 750 mg BID for a total duration of 6 weeks. See OPAT plan below.  No history of aneurysms. Qtc <500. Counseled patient on risk of dysglycemia with Cipro, and importance of monitoring glucose levels.    -- ID will schedule pt for ID clinic f/u appt   -- Discussed with ID staff and primary team.  -- ID will sign off at this time.   Please see OPAT note below for outpt abx plans.     Outpatient Antibiotic Therapy Plan:    1) Infection: Acute osteo of L foot    2) Discharge Antibiotics:    Oral antibiotics:  750mg Ciprofloxacin PO q 12 hours     3) Therapy Duration:  6 weeks    Start date: 10/18/2024 (from  when Bcx cleared)   End date of antibiotics: Friday, November 29th, 2024    4) Outpatient Weekly Labs:    Order the following labs to be drawn on Mondays:   CBC  CMP   CRP      5) Fax Lab Results to Infectious Diseases Provider: Dr. Foy     [facility] Fax Number: [phone]    6) Outpatient Infectious Diseases Follow-up    Follow-up appointment will be arranged by the ID clinic and will be found in the patient's appointments tab.    Prior to discharge, please ensure the patient's follow-up has been scheduled.    If there is still no follow-up scheduled prior to discharge, please send an [employer] message to [facility] [place] or Call Infectious Diseases Dept.

## 2024-10-21 NOTE — PLAN OF CARE
Problem: Occupational Therapy  Goal: Occupational Therapy Goal  Description: Goals to be met by: 10/28/24    Patient will increase functional independence with ADLs by performing:    LE Dressing with Stand-by Assistance.  Grooming while standing at sink with Set-up Assistance.  Toileting from toilet with [place] for hygiene and clothing management.   Supine to sit with Modified [place].  Toilet transfer to toilet with Modified [place].    Outcome: Progressing

## 2024-10-21 NOTE — ASSESSMENT & PLAN NOTE
Assesment  Wounds to plantar L foot.  Wound base mixture of granular and necrotic tissue.  Positive probe to bone test.  Wound with purulent drainage, malodor, erythema, swelling, bone exposure, crepitus, and undermining.  No tenderness to palpation.  XR L foot shows acute OM to 3rd met base, cuboid, and lateral cuneiform.  Bone cx 10/11 +pseudomonas. IDSA mild/moderate foot wound infection. s/p Left foot debridement, patient doing well.    Plan  -Physical exam and imaging findings discussed with the patient.  Discussed with the patient that he has OM to his L foot.  Bone cx that were collected in Dr. Peterson clinic +pseudo.  -Podiatry recommends continuing with abx and extensive wound care.  No surgical intervention from Podiatry perspective.  Patient okay to discharge  -New maceration to L forefoot and periwound most likely from condensation from wearing trashbag. Advised patient to remove trash bag from L foot after showering and allow his L foot to air dry.  -Abx per Primary  -L forefoot painted with Betadine and left open to air dry. L plantar foot wounds dressed betadine soaked gauze, abd pad, Kerlix, and ACE wrap  -Wound care orders updated  [facility]  -Podiatry will continue to follow    Discharge Recommendations  -Patient to follow up in outpatient Podiatry clinic with Dr. Peterson. Podiatry will schedule  -Antibiotics per Primary  -HH/SNF to do dressings 2-3 times a week as follows:  Rinse L foot wound with Vashe and pat dry.  Apply Hydrofera blue, cast padding, and ACE wrap.  -Weight bearing as tolerated in CAM boot  -Keep dressings clean, dry, and intact until follow-up appointment

## 2024-10-21 NOTE — PLAN OF CARE
[facility] ([place])  Discharge Reassessment    Primary Care Provider: Lorena Thakur MD    Expected Discharge Date: 10/22/2024    Reassessment (most recent)       Discharge Reassessment - 10/21/24 1315          Discharge Reassessment    Assessment Type Discharge Planning Reassessment     Did the patient's condition or plan change since previous assessment? No     Communicated FRANCISCO with patient/caregiver Yes     Discharge Plan A Home with family     Discharge Plan B Home Health     DME Needed Upon Discharge  none     Transition of Care Barriers None     Why the patient remains in the hospital Requires continued medical care        Post-Acute Status    Post-Acute Authorization Other     Hospital Resources/Appts/Education Provided Appointments scheduled and added to AVS     Discharge Delays None known at this time                       Discharge Plan A and Plan B have been determined by review of patient's clinical status, future medical and therapeutic needs, and coverage/benefits for post-acute care in coordination with multidisciplinary team members.    Raysa Salgado MSW, CSW

## 2024-10-21 NOTE — SUBJECTIVE & OBJECTIVE
Subjective:     Interval History:  No adverse events overnight.  VSS, afebrile, WBC WNL, .  Patient sitting up in bed watching TV with CAM boot in place to L foot.  Patient has trash bag over L foot dressing from when he had to take a shower.  Patient denies fevers, chills, nausea, or vomiting.  Denies SOB or chest pain.  Denies any new pedal complaints.    Scheduled Meds:   ceFEPime IV (PEDS and ADULTS)  2 g Intravenous Q8H    heparin (porcine)  5,000 Units Subcutaneous Q8H    insulin glargine U-100 (Lantus)  26 Units Subcutaneous Nightly    k phos di & mono-sod phos mono  250 mg Oral BID    pantoprazole  40 mg Oral Daily     Continuous Infusions:  PRN Meds:  Current Facility-Administered Medications:     0.9%  NaCl infusion (for blood administration), , Intravenous, Q24H PRN    chlorproMAZINE, 25 mg, Oral, QID PRN    dextrose 10%, 12.5 g, Intravenous, PRN    dextrose 10%, 25 g, Intravenous, PRN    glucagon (human recombinant), 1 mg, Intramuscular, PRN    insulin aspart U-100, 0-10 Units, Subcutaneous, QID (AC + HS) PRN    ondansetron, 4 mg, Intravenous, Q6H PRN    sodium chloride 0.9%, 10 mL, Intravenous, PRN    Review of Systems   Constitutional:  Negative for fatigue and fever.   HENT: Negative.     Eyes: Negative.    Respiratory: Negative.     Cardiovascular: Negative.    Gastrointestinal: Negative.    Endocrine: Negative.    Genitourinary: Negative.    Musculoskeletal:  Positive for gait problem.   Skin:  Positive for color change and wound.        Reports wounds to his L foot   Allergic/Immunologic: Negative.    Hematological: Negative.    Psychiatric/Behavioral: Negative.       Objective:     Vital Signs (Most Recent):  Temp: 98 °F (36.7 °C) (10/21/24 0803)  Pulse: 89 (10/21/24 0803)  Resp: 18 (10/21/24 0803)  BP: (!) 146/86 (10/21/24 0803)  SpO2: 98 % (10/21/24 0803) Vital Signs (24h Range):  Temp:  [97.6 °F (36.4 °C)-98.7 °F (37.1 °C)] 98 °F (36.7 °C)  Pulse:  [] 89  Resp:  [18-20] 18  SpO2:   [97 %-100 %] 98 %  BP: (103-146)/(65-86) 146/86     Weight: 97.5 kg (214 lb 15.2 oz)  Body mass index is 28.36 kg/m².    Physical Exam     Constitutional:       Appearance: He is well-developed.      Cardiovascular:      Comments: Dorsalis pedis and posterior tibial pulses are palpable Skin is atrophic, slightly hyperpigmented, and mildly edematous     Musculoskeletal:         General: No tenderness. Normal range of motion.      Comments: Muscle strength is 5/5 in all groups bilaterally.     S/p partial 5th ray amp b/l  S/p hallux amp right  Fat pad atrophy to heels and met heads bilateral     Skin:     General: Skin is warm and dry.      Coloration: Skin is not jaundiced, mottled or sallow.      Findings: Wound (see below) present. No abscess or ecchymosis.      Comments:  2 wounds noted to plantar L foot.  Wound base mixture of granular and necrotic tissue.  Positive probe to bone.  Malodor, crepitus, purulent drainage, erythema, and swelling noted.     Measurment: 6.2 x 3.3 x 0.4 cm (proximal wound), 1.5 x 1.5 x 0.2 cm(distal wound)     Neurological:      Mental Status: He is alert and oriented to person, place, and time.      Comments: [place] 5.07 monofilamant testing is diminished Kenji feet. Sharp/dull sensation diminished Bilaterally. Light touch absent Bilaterally.     Psychiatric:         Behavior: Behavior normal.    Comments:  10/21:  New maceration noted to distal L digits and plantar L foot periwound.  Wounds to plantar L foot stable without any necrotic tissue.  Improvements of serous drainage.  No purulent drainage noted.    Laboratory:  BMP:   Recent Labs   Lab 10/21/24  0340   *      K 3.1*      CO2 27   BUN 12   CREATININE 1.1   CALCIUM 9.7   MG 1.8     CBC:   Recent Labs   Lab 10/21/24  0340   WBC 5.66   RBC 3.63*   HGB 10.1*   HCT 32.7*      MCV 90   MCH 27.8   MCHC 30.9*     Diagnostic Results:  I have reviewed all pertinent imaging results/findings within the  past 24 hours.    Clinical Findings:  L plantar foot wound    New maceration to L forefoot

## 2024-10-21 NOTE — PROGRESS NOTES
[facility] - Stepdown Flex ([place] [address])  [facility] Medicine  Progress Note    Patient Name: Indio Ryder Jr.  MRN: [id number]  Patient Class: IP- Inpatient   Admission Date: 10/16/2024  Length of Stay: 5 days  Attending Physician: Roxi Pack MD  Primary Care Provider: Lorena Thakur MD        Subjective:     Principal Problem:Bacteremia        HPI:  No notes on file    Overview/Hospital Course:  No notes on file    Interval History: Patient seen and examined. NAEO. No longer having hiccups. Has no significant pain.       Objective:     Vital Signs (Most Recent):  Temp: 98.7 °F (37.1 °C) (10/21/24 0349)  Pulse: 104 (10/21/24 0349)  Resp: 18 (10/21/24 0349)  BP: 128/74 (10/21/24 0349)  SpO2: 100 % (10/21/24 0349) Vital Signs (24h Range):  Temp:  [97.6 °F (36.4 °C)-98.7 °F (37.1 °C)] 98.7 °F (37.1 °C)  Pulse:  [] 104  Resp:  [18-20] 18  SpO2:  [97 %-100 %] 100 %  BP: (103-133)/(65-84) 128/74     Weight: 97.5 kg (214 lb 15.2 oz)  Body mass index is 28.36 kg/m².    Intake/Output Summary (Last 24 hours) at 10/21/2024 0757  Last data filed at 10/21/2024 0100  Gross per 24 hour   Intake 1380 ml   Output 2700 ml   Net -1320 ml         Physical Exam  Constitutional:       General: He is not in acute distress.     Appearance: Normal appearance. He is not toxic-appearing.   HENT:      Head: Normocephalic and atraumatic.   Eyes:      Pupils: Pupils are equal, round, and reactive to light.   Cardiovascular:      Rate and Rhythm: Normal rate and regular rhythm.   Pulmonary:      Effort: Pulmonary effort is normal.      Breath sounds: Normal breath sounds.   Abdominal:      General: Abdomen is flat.      Palpations: Abdomen is soft.   Musculoskeletal:         General: Normal range of motion.      Cervical back: Normal range of motion.   Skin:     General: Skin is warm and dry.      Comments: Extremity wrapped/bandaged    Neurological:      General: No focal deficit present.   Psychiatric:         Mood and  Affect: Mood normal.             Significant Labs: All pertinent labs within the past 24 hours have been reviewed.    Significant Imaging: I have reviewed all pertinent imaging results/findings within the past 24 hours.    Assessment/Plan:      * Bacteremia  -severe sepsis likely due to Klebsiella Aerogenes and Pseudomonas Bacteremia as well acute on chronic OM   -continue IV Cefepime q8h while susceptibilities are pending   -repeat BC NGTD, pt improving on current regimen   -will de escalate once susceptiblities have resulted           Chronic osteomyelitis of left foot  -previous cultures prior to admission with Podiatry clinic growing Pseudomonas   -s/p Debridement with Podiatry, no plans for acute surgical intervention   -noted in the chart patient recently completely IV Daptomycin treatment for OM and has had multiple repeated infections   -continue IV Cefepime at this time   -wound care daily   -will consult ID for course duration and final abx recs given history of repeat infections and poor source control once susceptibilities have finalized   -will arrange outpatient wound care dressing changes closer to time of discharge       Severe sepsis  -2/2 to bacteremia and Acute on Chronic OM of the extremity   -with SIRS, source of infection, TAHIR and Encephalopathy met criteria for severe sepsis   -On IV Cefepime q8h for Klebsiella and Pseudomonas-Sensitivities pending   -Previous wound cultures of the foot positive for Pseudomonas (had not started Cipro outpatient)   -improving              Type 2 diabetes mellitus with hyperglycemia, with long-term current use of insulin  -BG appears better controlled   - Working to optimize BG control  - Lantus 26 units   - SSI provided for corrective dosing  - POCT glucose checks as ordered  - Hypoglycemic protocol in effect  - Diabetic diet provided      Encephalopathy, metabolic  -likely due to DKA and multiple infections   -resolved, continue to monitor         TAHIR (acute  kidney injury)  -resolved with IVFs and treatment of DKA   -continue to monitor on BMPs daily  -hold nephrotoxic meds      Hematemesis  -seen and evaluated by GI, no plans for scope   -suspected valorie steve tear vs vomiting old blood products from dental procedure   de escalate to PO PPI  - Will trend hemoglobin/hematocrit Daily  - Will monitor and correct any coagulation defects  - Will transfuse if Hgb is <7g/dl (<8g/dl in cases of active ACS) or if patient has rapid bleeding leading to hemodynamic instability    DKA (diabetic ketoacidosis)  -likely 2/2 to acute infection   -s/p insulin gtt and fluids in the ICU   -AG has resolved and pt tolerating PO   -continue Lantus 26 units and SSI   -continue monitor glucose achs       Hypernatremia  -resolved with IVF, stable off fluids   -continue to monitor with BMP       VTE Risk Mitigation (From admission, onward)           Ordered     heparin (porcine) injection 5,000 Units  Every 8 hours         10/17/24 1612     Place sequential compression device  Until discontinued         10/16/24 1214     IP VTE HIGH RISK PATIENT  Once         10/16/24 1212                    Discharge Planning   FRANCISCO: 10/22/2024     Code Status: Full Code   Is the patient medically ready for discharge?:     Reason for patient still in hospital (select all that apply): Treatment  Discharge Plan A: Home Health, Home with family                  Roxi Pack MD  Department of Hospital Medicine   [facility] - [facility] ([place])

## 2024-10-21 NOTE — CONSULTS
[facility] ([place])  Infectious Disease  Consult Note    Patient Name: Indio Ryder Jr.  MRN: [id number]  Admission Date: 10/16/2024  Hospital Length of Stay: 5 days  Attending Physician: Roxi Pack MD  Primary Care Provider: Lorena Thakur MD     Isolation Status: No active isolations    Patient information was obtained from patient, past medical records, and ER records.      Inpatient consult to Infectious Diseases  Consult performed by: Latricia Edwards PA-C  Consult ordered by: Roxi Pack MD        Assessment/Plan:     ID  Bacteremia  Chronic osteomyelitis of left foot  I have reviewed hospital notes from  HM service and other specialty providers. I have also reviewed CBC, CMP/BMP,  cultures and imaging with my interpretation as documented.      54 year old male with history of DMT2 (Alc 11% 10/16/2024), colon adenocarcinoma (s/p resection in 2022), HTN, HLD, and numerous episodes of foot osteomyelitis with L foot chronic osteomyelitis. Presented to ER on 10/16/2024 with AMS and hematemesis. Admitted for management of DKA and possible GI bleed. Patient follows closely with podiatry and wound care for chronic osteo. Patient recently completed 8 weeks of IV Dapto for chronic osteo (ended IV abx 9/25/2024). Bone culture and pathology sent from recent podiatry visit (10/11/2024). Bone culture of L foot (10/11/2024) grew Pseudomonas aeruginosa. Blood cx (10/16/2024) grew Pseudomonas aeruginosa and Klebsiella aerogenes  (sens to Cefepime, only deemed  due to intermediate to Imipenem). Patient started on IV Cefepime. Left foot X-ray (10/18/2024) notable for acute OM of the 3rd metatarsal base, cuboid, and lateral cuneiform. Pathology (10/11/2024) confirmed acute osteo. Podiatry consulted, L foot debrided at bedside (10/17/2024), and no role for surgical intervention.  ID consulted for final antibiotic recs for acute osteomyelitis and bacteremia.    Recommendations  / Plan:  May transition to PO Cipro 750 mg BID for a total duration of 6 weeks.   Start date: 10/18/2024 (from when Bcx cleared)   End date: Friday, 11/29/2024  No history of aneurysms. Qtc <500. Counseled patient on risk of dysglycemia with Cipro, and importance of monitoring glucose levels.  Plan for following labs: CMP, CRP, CBC every 2 weeks.    -- ID will schedule pt for ID clinic f/u appt   -- Discussed with ID staff and primary team.  -- ID will sign off at this time.      Thank you for your consult. I will sign off. Please contact us if you have any additional questions.    Latricia Edwards PA-C  Infectious Disease  [facility] ([place]    Subjective:     Principal Problem: Bacteremia    HPI: 54 year old male with history of DMT2 (Alc 11% 10/16/2024), colon adenocarcinoma (s/p resection in 2022), HTN, HLD, and numerous episodes of foot osteomyelitis with L foot chronic osteomyelitis. Presented to ER on 10/16/2024 with AMS and hematemesis. Glucose of 375 on admit and admitted for management of DKA and possible GI bleed. DKA resolved and no recurrence of hematemesis in hospital. Hb stable. GI suspected Christel-Yanes tear vs vomiting ingested blood from recent dental procedure. Recent bone culture of L foot (10/11/2024) grew Pseudomonas aeruginosa. Bcx (10/16/2024) grew Pseudomonas aeruginosa and Klebsiella aerogenes  (sens to Cefepime, only deemed  due to intermediate to Imipenem). Patient started on IV Cefepime. Left foot X-ray (10/18/2024) notable for acute OM of the 3rd metatarsal base, cuboid, and lateral cuneiform. Pathology confirmed acute osteo (10/11/24). Podiatry consulted, L foot debrided at bedside (10/17/2024), and no role for surgical intervention.  ID consulted for final antibiotic recs for acute osteomyelitis and bacteremia.    Patient follows closely with podiatry and wound care for chronic osteo. Historically bone cx 7/2023 grew staph lugdunensis and E. faecalis.  Wound cx 4/2/24 grew pseudomonas and klebsiella. Bone biopsy 5/17/24 revealed chronic osteomyelitis; bone cx grew enterococcus faecalis, prescribed levofloxacin. Bone cx 7/5/24 w/o growth. Patient recently completed 8 weeks of IV Dapto for chronic osteo. He was seen in ID clinic on 9/10/2024 and ended IV abx 9/25/2024. Patient states he was going to all his wound care and podiatry appointments, but his foot seemed to be worsen within days prior to admission. He was on oral antibiotics per his dentist for a dental infection when he saw podiatry in clinic on 10/11/2024. Wound was debrided and bone was sent for culture and pathology as podiatry noted significant regression of wound with exposed bone. Podiatry prescribed PO Cipro BID x10 days on 10/14/24, but patient never took before he presented to the ER two days later.     Patient states he is not sure why is foot wounds worsened so quickly. He felt he was doing well after completing the IV antibiotics. He denies any new trauma to foot or water exposure. He uses a scooter and does not bear weight on L foot. Denies fever or chills, nausea or vomiting, or any pain anywhere. Notes he cannot feel much in his feet due to neuropathy. He is amenable to prolonged course of antibiotics, but would prefer orals over IV.          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    Right foot    Colon adenocarcinoma 04/12/2022    Diabetic ketoacidosis without coma associated with type 2 diabetes mellitus 05/30/2017    Diabetic ulcer of right foot associated with type 2 diabetes mellitus 06/03/2015    Disorder of kidney and ureter     Essential hypertension 06/06/2013    Group B streptococcal infection 01/13/2017    Mixed hyperlipidemia 08/12/2014    Septic arthritis of left foot 04/28/2021    Shoulder impingement 08/12/2014    Subacute osteomyelitis of right foot 01/12/2017    Streptococcus agalactiae, Actinomyces odontolyticus    Traumatic amputation  of fifth toe of right foot 07/02/2015    Type 2 diabetes mellitus with diabetic neuropathy, with long-term current use of insulin 05/03/2016       Past Surgical History:   Procedure Laterality Date    COLONOSCOPY Right 1/19/2022    Procedure: COLONOSCOPY;  Surgeon: Tj Haile MD;  Location: [facility] ENDO (4TH FLR);  Service: Endoscopy;  Laterality: Right;  previous order un-usable/poor prep 1/18/22  RAPID COVID test arrival 12:20  instructions handed to pt-     COLONOSCOPY N/A 3/21/2022    Procedure: COLONOSCOPY;  Surgeon: Sheng Haque MD;  Location: [facility] ENDO (2ND FLR);  Service: Endoscopy;  Laterality: N/A;  Colonoscopy with AES for hepatix flexure polyp removal, 2nd floor  Pt is fully vaccinated-DS  Pt prescribed Plavix by Dr. Stahl in Jan. 2022, however pt states that he never started taking it-DS  3/16/22-Instructions sent via portal-DS    COLONOSCOPY N/A 9/13/2023    Procedure: COLONOSCOPY;  Surgeon: Erik Stout MD;  Location: [facility] ENDO (4TH FLR);  Service: Colon and Rectal;  Laterality: N/A;  Referred by Dr. Stout, suprilsa, Meds, portal -ml    DEBRIDEMENT OF FOOT Left 7/14/2019    Procedure: DEBRIDEMENT, FOOT, with left 5th ray amputation;  Surgeon: Sis Hickman DPM;  Location: [facility] OR 2ND FLR;  Service: Podiatry;  Laterality: Left;    DEBRIDEMENT OF FOOT Left 7/17/2019    Procedure: DEBRIDEMENT, FOOT with leftt 5th ray partial amputation with Neox Graft;  Surgeon: Mai Burrell DPM;  Location: [facility] OR 2ND FLR;  Service: Podiatry;  Laterality: Left;  t    DEBRIDEMENT OF FOOT Bilateral 4/29/2021    Procedure: DEBRIDEMENT, FOOT;  Surgeon: Mai Burrell DPM;  Location: [facility] OR 1ST FLR;  Service: Podiatry;  Laterality: Bilateral;  Graft application    DEBRIDEMENT OF FOOT Left 7/31/2023    Procedure: DEBRIDEMENT, FOOT;  Surgeon: Marivel Peterson DPM;  Location: [facility] OR 2ND FLR;  Service: Podiatry;  Laterality: Left;    TOE AMPUTATION Right 06/05/2015    TOE AMPUTATION Right  "01/19/2017    XI ROBOTIC COLECTOMY, RIGHT N/A 4/25/2022    Procedure: XI ROBOTIC COLECTOMY, RIGHT (lithotomy, eras low);  Surgeon: Erik Stout MD;  Location: [facility] OR 2ND FLR;  Service: Colon and Rectal;  Laterality: N/A;  Paruch to Goodwin    XI ROBOTIC COLECTOMY, RIGHT  4/25/2022    Procedure: ;  Surgeon: Erik Stout MD;  Location: [facility] OR 2ND FLR;  Service: Colon and Rectal;;       Review of patient's allergies indicates:  No Known Allergies    Medications:  Medications Prior to Admission   Medication Sig    baclofen (LIORESAL) 10 MG tablet Take 1 tablet (10 mg total) by mouth nightly.    empagliflozin (JARDIANCE) 25 mg tablet Take 1 tablet (25 mg total) by mouth once daily.    insulin aspart U-100 (NOVOLOG) 100 unit/mL (3 mL) InPn pen Inject if sugar is > 180 up to 4x a day , 180-230+2, 231-280+4, 281-330+6, 331-380+8, >380+10. Max daily 40 units    insulin glargine U-300 conc (TOUJEO MAX U-300 SOLOSTAR) 300 unit/mL (3 mL) insulin pen Inject 26 to 32 units under the skin at night    ONETOUCH DELICA LANCETS 33 gauge Misc     ONETOUCH ULTRA2 kit     pen needle, diabetic (BD SASCHA 2ND GEN PEN NEEDLE) 32 gauge x 5/32" Ndle Use 4 times a day     Antibiotics (From admission, onward)      Start     Stop Route Frequency Ordered    10/21/24 2100  ciprofloxacin HCl tablet 750 mg         -- Oral Every 12 hours 10/21/24 1438          Antifungals (From admission, onward)      None          Antivirals (From admission, onward)      None             Immunization History   Administered Date(s) Administered    COVID-19 MRNA, LN-S PF (MODERNA HALF 0.25 ML DOSE) 11/19/2021    COVID-19, MRNA, LN-S, PF (MODERNA FULL 0.5 ML DOSE) 02/26/2021, 03/26/2021    Pneumococcal Conjugate - 13 Valent 06/09/2017    Pneumococcal Polysaccharide - 23 Valent 08/12/2014    Tdap 06/09/2017       Family History       Problem Relation (Age of Onset)    Cancer Mother    Cataracts Father    Diabetes Mother, Sister    Heart disease Father, " Sister    Hypertension Mother, Father, Maternal Aunt    No Known Problems Brother, Sister, Maternal Uncle, Paternal Aunt, Paternal Uncle, Maternal Grandmother, Maternal Grandfather, Paternal Grandmother, Paternal Grandfather          Social History     Socioeconomic History    Marital status: Single    Number of children: 0   Occupational History    Occupation: Retired     Employer: [employer]   Tobacco Use    Smoking status: Never    Smokeless tobacco: Never   Substance and Sexual Activity    Alcohol use: No    Drug use: No    Sexual activity: Yes     Partners: Female     Birth control/protection: Condom     Social Drivers of Health     Financial Resource Strain: Patient Declined (10/16/2024)    Overall Financial Resource Strain (CARDIA)     Difficulty of Paying Living Expenses: Patient declined   Food Insecurity: Patient Declined (10/16/2024)    Hunger Vital Sign     Worried About Running Out of Food in the Last Year: Patient declined     Ran Out of Food in the Last Year: Patient declined   Transportation Needs: Patient Declined (10/16/2024)    TRANSPORTATION NEEDS     Transportation : Patient declined   Physical Activity: Unknown (7/2/2024)    Exercise Vital Sign     Days of Exercise per Week: 0 days   Recent Concern: Physical Activity - Inactive (7/2/2024)    Exercise Vital Sign     Days of Exercise per Week: 0 days     Minutes of Exercise per Session: 0 min   Stress: Patient Declined (10/16/2024)    [employer] [place] [employer] - Occupational Stress Questionnaire     Feeling of Stress : Patient declined   Housing Stability: Patient Declined (10/16/2024)    Housing Stability Vital Sign     Unable to Pay for Housing in the Last Year: Patient declined     Homeless in the Last Year: Patient declined     Review of Systems   Constitutional:  Negative for chills and fever.   HENT:  Negative for sore throat.    Respiratory:  Negative for cough and shortness of breath.    Cardiovascular:  Negative for  chest pain.   Gastrointestinal:  Negative for abdominal pain, diarrhea, nausea and vomiting.   Skin:  Positive for wound (L foot). Negative for rash.     Objective:     Vital Signs (Most Recent):  Temp: 97.9 °F (36.6 °C) (10/21/24 1158)  Pulse: (!) 125 (10/21/24 1447)  Resp: 18 (10/21/24 1158)  BP: (!) 148/76 (10/21/24 1158)  SpO2: 97 % (10/21/24 1448) Vital Signs (24h Range):  Temp:  [97.9 °F (36.6 °C)-98.7 °F (37.1 °C)] 97.9 °F (36.6 °C)  Pulse:  [] 125  Resp:  [18] 18  SpO2:  [97 %-100 %] 97 %  BP: (103-148)/(65-86) 148/76     Weight: 97.5 kg (214 lb 15.2 oz)  Body mass index is 28.36 kg/m².    Estimated Creatinine Clearance: 92.2 mL/min (based on SCr of 1.1 mg/dL).     Physical Exam  Constitutional:       General: He is not in acute distress.     Appearance: Normal appearance. He is not ill-appearing.   HENT:      Head: Normocephalic and atraumatic.      Nose: Nose normal.      Mouth/Throat:      Mouth: Mucous membranes are moist.   Eyes:      Conjunctiva/sclera: Conjunctivae normal.   Cardiovascular:      Rate and Rhythm: Regular rhythm. Tachycardia present.      Pulses: Normal pulses.      Heart sounds: No murmur heard.  Pulmonary:      Effort: Pulmonary effort is normal. No respiratory distress.      Breath sounds: Normal breath sounds.   Abdominal:      Palpations: Abdomen is soft.      Tenderness: There is no abdominal tenderness.   Musculoskeletal:      Right lower leg: No edema.   Skin:     General: Skin is warm.      Capillary Refill: Capillary refill takes less than 2 seconds.      Comments: L foot wrapped with bandage and ace wrap. No pain, however patient has decreased sensation to foot due to neuropathy.   Neurological:      Mental Status: He is alert and oriented to person, place, and time.          Significant Labs: Blood Culture:   Recent Labs   Lab 10/16/24  1221 10/18/24  1245 10/18/24  1311   LABBLOO Gram stain mary bottle: Gram negative rods  Results called to and read back by:Alex  RN 10/17/2024  06:29  KLEBSIELLA AEROGENES *  Gram stain aer bottle: Gram negative rods  Positive results previously called 10/18/2024  06:28  PSEUDOMONAS AERUGINOSA* No Growth to date  No Growth to date  No Growth to date  No Growth to date No Growth to date  No Growth to date  No Growth to date     CBC:   Recent Labs   Lab 10/20/24  0519 10/21/24  0340   WBC 5.66 5.66   HGB 10.1* 10.1*   HCT 32.3* 32.7*    332     Wound Culture:   Recent Labs   Lab 05/17/24  0840 06/25/24  1630 07/05/24  0910 10/11/24  0925   LABAERO ENTEROCOCCUS FAECALIS  Few  * No growth No significant isolate PSEUDOMONAS AERUGINOSA  Moderate  *       Significant Imaging: I have reviewed all pertinent imaging results/findings within the past 24 hours.

## 2024-10-21 NOTE — PT/OT/SLP EVAL
Occupational Therapy   Evaluation    Name: Indio Ryder Jr.  MRN: [id number]  Admitting Diagnosis: Bacteremia  Recent Surgery: * No surgery found *      Recommendations:     Discharge Recommendations: No Therapy Indicated  Discharge Equipment Recommendations:  shower chair  Barriers to discharge:  Decreased caregiver support    Assessment:     Indio Ryder Jr. is a 54 y.o. male with a medical diagnosis of Bacteremia.  He presents with a (L) heel wound requiring the use of a CAM boot whenever walking. Performance deficits affecting function: impaired endurance, weakness, decreased coordination, impaired self care skills, impaired functional mobility, gait instability, impaired balance, decreased lower extremity function.      Rehab Prognosis: Good; patient would benefit from acute skilled OT services to address these deficits and reach maximum level of function.       Plan:     Patient to be seen 3 x/week to address the above listed problems via self-care/home management, therapeutic activities, therapeutic exercises  Plan of Care Expires:    Plan of Care Reviewed with: patient    Subjective     Occupational Profile:  Living Environment: Pt lives alone but will stay with his sister upon D/C  Previous level of function: (I) with ADLs / has been using the knee scooter x 1 year.  Equipment Used at Home: walker, rolling, cane, quad (knee scooter)  Assistance upon Discharge: family    Pain/Comfort:  Pain Rating 1: 0/10    Patients cultural, spiritual, Mandaen conflicts given the current situation:      Objective:     Communicated with: rn prior to session.  Patient found supine with telemetry, peripheral IV upon OT entry to room.    General Precautions: Standard, fall  Orthopedic Precautions: LLE weight bearing as tolerated (with a CAM boot)  Braces:    Respiratory Status: Room air    Occupational Performance:    Bed Mobility:    Patient completed Scooting/Bridging with supervision  Patient completed Supine to  Sit with supervision    Functional Mobility/Transfers:  Patient completed Sit <> Stand Transfer with stand by assistance  with  rolling walker   Patient completed Bed <> Chair Transfer using Step Transfer technique with contact guard assistance with rolling walker  Patient completed Toilet Transfer Step Transfer technique with stand by assistance with  rolling walker  Functional Mobility: Pt walked to the bathroom then around the room SBA with a RW.    Activities of Daily Living:  Lower Body Dressing: maximal assistance to don CAM boot on left side.    Cognitive/Visual Perceptual:  Cognitive/Psychosocial Skills:     -       Oriented to: Person, Place, Time, and Situation   -       Safety awareness/insight to disability: intact     Physical Exam:  Upper Extremity Range of Motion:     -       Right Upper Extremity: WNL  -       Left Upper Extremity: WNL  Upper Extremity Strength:    -       Right Upper Extremity: WNL  -       Left Upper Extremity: WFL    AMPAC 6 Click ADL:  AMPAC Total Score:      Treatment & Education:  UE ROM/MMT  Bed mobility training / assessment  Functional mobility assessment  Sit/standing balance assessment  Educated on importance of sitting OOB in bedside chair to promote increased strength, endurance & breathing.  Discussed OT POC / Post-acute plan    Patient left up in chair with all lines intact and call button in reach    GOALS:   Multidisciplinary Problems       Occupational Therapy Goals          Problem: Occupational Therapy    Goal Priority Disciplines Outcome Interventions   Occupational Therapy Goal     OT, PT/OT Progressing    Description: Goals to be met by: 10/28/24    Patient will increase functional independence with ADLs by performing:    LE Dressing with Stand-by Assistance.  Grooming while standing at sink with Set-up Assistance.  Toileting from toilet with [place] for hygiene and clothing management.   Supine to sit with Modified [place].  Toilet transfer to toilet  with Modified [place].                         History:     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    Right foot    Colon adenocarcinoma 04/12/2022    Diabetic ketoacidosis without coma associated with type 2 diabetes mellitus 05/30/2017    Diabetic ulcer of right foot associated with type 2 diabetes mellitus 06/03/2015    Disorder of kidney and ureter     Essential hypertension 06/06/2013    Group B streptococcal infection 01/13/2017    Mixed hyperlipidemia 08/12/2014    Septic arthritis of left foot 04/28/2021    Shoulder impingement 08/12/2014    Subacute osteomyelitis of right foot 01/12/2017    Streptococcus agalactiae, Actinomyces odontolyticus    Traumatic amputation of fifth toe of right foot 07/02/2015    Type 2 diabetes mellitus with diabetic neuropathy, with long-term current use of insulin 05/03/2016         Past Surgical History:   Procedure Laterality Date    COLONOSCOPY Right 1/19/2022    Procedure: COLONOSCOPY;  Surgeon: Tj Haile MD;  Location: [facility] ENDO (4TH FLR);  Service: Endoscopy;  Laterality: Right;  previous order un-usable/poor prep 1/18/22  RAPID COVID test arrival 12:20  instructions handed to pt-     COLONOSCOPY N/A 3/21/2022    Procedure: COLONOSCOPY;  Surgeon: Sheng Haque MD;  Location: [facility] ENDO (2ND FLR);  Service: Endoscopy;  Laterality: N/A;  Colonoscopy with AES for hepatix flexure polyp removal, 2nd floor  Pt is fully vaccinated-DS  Pt prescribed Plavix by Dr. Stahl in Jan. 2022, however pt states that he never started taking it-DS  3/16/22-Instructions sent via portal-DS    COLONOSCOPY N/A 9/13/2023    Procedure: COLONOSCOPY;  Surgeon: Erik Stout MD;  Location: [facility] ENDO (4TH FLR);  Service: Colon and Rectal;  Laterality: N/A;  Referred by timur Saunders, Meds, portal -ml    DEBRIDEMENT OF FOOT Left 7/14/2019    Procedure: DEBRIDEMENT, FOOT, with left 5th ray amputation;  Surgeon: Sis Hickman DPM;   Location: [facility] OR 2ND FLR;  Service: Podiatry;  Laterality: Left;    DEBRIDEMENT OF FOOT Left 7/17/2019    Procedure: DEBRIDEMENT, FOOT with leftt 5th ray partial amputation with Neox Graft;  Surgeon: Mai Burrell DPM;  Location: [facility] OR 2ND FLR;  Service: Podiatry;  Laterality: Left;  t    DEBRIDEMENT OF FOOT Bilateral 4/29/2021    Procedure: DEBRIDEMENT, FOOT;  Surgeon: Mai Burrell DPM;  Location: [facility] OR 1ST FLR;  Service: Podiatry;  Laterality: Bilateral;  Graft application    DEBRIDEMENT OF FOOT Left 7/31/2023    Procedure: DEBRIDEMENT, FOOT;  Surgeon: Marivel Peterson DPM;  Location: [facility] OR 2ND FLR;  Service: Podiatry;  Laterality: Left;    TOE AMPUTATION Right 06/05/2015    TOE AMPUTATION Right 01/19/2017    XI ROBOTIC COLECTOMY, RIGHT N/A 4/25/2022    Procedure: XI ROBOTIC COLECTOMY, RIGHT (lithotomy, eras low);  Surgeon: Erik Stout MD;  [facility];  Service: Colon and Rectal;  Laterality: N/A;  Paruch to Goodwin    XI ROBOTIC COLECTOMY, RIGHT  4/25/2022    Procedure: ;  Surgeon: Erik Stout MD;  [facility];  Service: Colon and Rectal;;       Time Tracking:     OT Date of Treatment: 10/21/24  OT Start Time: 0828  OT Stop Time: 0843  OT Total Time (min): 15 min    Billable Minutes:Evaluation 5  Therapeutic Activity 10    10/21/2024

## 2024-10-21 NOTE — SUBJECTIVE & OBJECTIVE
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    Right foot    Colon adenocarcinoma 04/12/2022    Diabetic ketoacidosis without coma associated with type 2 diabetes mellitus 05/30/2017    Diabetic ulcer of right foot associated with type 2 diabetes mellitus 06/03/2015    Disorder of kidney and ureter     Essential hypertension 06/06/2013    Group B streptococcal infection 01/13/2017    Mixed hyperlipidemia 08/12/2014    Septic arthritis of left foot 04/28/2021    Shoulder impingement 08/12/2014    Subacute osteomyelitis of right foot 01/12/2017    Streptococcus agalactiae, Actinomyces odontolyticus    Traumatic amputation of fifth toe of right foot 07/02/2015    Type 2 diabetes mellitus with diabetic neuropathy, with long-term current use of insulin 05/03/2016       Past Surgical History:   Procedure Laterality Date    COLONOSCOPY Right 1/19/2022    Procedure: COLONOSCOPY;  Surgeon: Tj Haile MD;  Location: [facility] (4TH FLR);  Service: Endoscopy;  Laterality: Right;  previous order un-usable/poor prep 1/18/22  RAPID COVID test arrival 12:20  instructions handed to pt- sm    COLONOSCOPY N/A 3/21/2022    Procedure: COLONOSCOPY;  Surgeon: Sheng Haque MD;  Location: [facility] (2ND FLR);  Service: Endoscopy;  Laterality: N/A;  Colonoscopy with AES for hepatix flexure polyp removal, 2nd floor  Pt is fully vaccinated-DS  Pt prescribed Plavix by Dr. Stahl in Jan. 2022, however pt states that he never started taking it-DS  3/16/22-Instructions sent via portal-DS    COLONOSCOPY N/A 9/13/2023    Procedure: COLONOSCOPY;  Surgeon: Erik Stout MD;  Location: [facility] (4TH FLR);  Service: Colon and Rectal;  Laterality: N/A;  Referred by timur Saunders, Meds, portal -ml    DEBRIDEMENT OF FOOT Left 7/14/2019    Procedure: DEBRIDEMENT, FOOT, with left 5th ray amputation;  Surgeon: Sis Hickman DPM;  Location: [facility] OR 2ND FLR;  Service: Podiatry;  Laterality: Left;     "DEBRIDEMENT OF FOOT Left 7/17/2019    Procedure: DEBRIDEMENT, FOOT with leftt 5th ray partial amputation with Neox Graft;  Surgeon: Mai Burrell DPM;  Location: [facility] OR 2ND FLR;  Service: Podiatry;  Laterality: Left;  t    DEBRIDEMENT OF FOOT Bilateral 4/29/2021    Procedure: DEBRIDEMENT, FOOT;  Surgeon: Mai Burrell DPM;  Location: [facility] OR 1ST FLR;  Service: Podiatry;  Laterality: Bilateral;  Graft application    DEBRIDEMENT OF FOOT Left 7/31/2023    Procedure: DEBRIDEMENT, FOOT;  Surgeon: Marivel Peterson DPM;  Location: [facility] OR 2ND FLR;  Service: Podiatry;  Laterality: Left;    TOE AMPUTATION Right 06/05/2015    TOE AMPUTATION Right 01/19/2017    XI ROBOTIC COLECTOMY, RIGHT N/A 4/25/2022    Procedure: XI ROBOTIC COLECTOMY, RIGHT (lithotomy, eras low);  Surgeon: Erik Stout MD;  Location: [facility] OR 2ND FLR;  Service: Colon and Rectal;  Laterality: N/A;  Paruch to [place]    XI ROBOTIC COLECTOMY, RIGHT  4/25/2022    Procedure: ;  Surgeon: Erik Stout MD;  Location: [facility] OR 2ND FLR;  Service: Colon and Rectal;;       Review of patient's allergies indicates:  No Known Allergies    Medications:  Medications Prior to Admission   Medication Sig    baclofen (LIORESAL) 10 MG tablet Take 1 tablet (10 mg total) by mouth nightly.    empagliflozin (JARDIANCE) 25 mg tablet Take 1 tablet (25 mg total) by mouth once daily.    insulin aspart U-100 (NOVOLOG) 100 unit/mL (3 mL) InPn pen Inject if sugar is > 180 up to 4x a day , 180-230+2, 231-280+4, 281-330+6, 331-380+8, >380+10. Max daily 40 units    insulin glargine U-300 conc (TOUJEO MAX U-300 SOLOSTAR) 300 unit/mL (3 mL) insulin pen Inject 26 to 32 units under the skin at night    ONETOUCH DELICA LANCETS 33 gauge Mis     ONETOUCH ULTRA2 kit     pen needle, diabetic (BD SASCHA 2ND GEN PEN NEEDLE) 32 gauge x 5/32" Ndle Use 4 times a day     Antibiotics (From admission, onward)      Start     Stop Route Frequency Ordered    10/21/24 2100  " ciprofloxacin HCl tablet 750 mg         -- Oral Every 12 hours 10/21/24 1438          Antifungals (From admission, onward)      None          Antivirals (From admission, onward)      None             Immunization History   Administered Date(s) Administered    COVID-19 MRNA, LN-S PF (MODERNA HALF 0.25 ML DOSE) 11/19/2021    COVID-19, MRNA, LN-S, PF (MODERNA FULL 0.5 ML DOSE) 02/26/2021, 03/26/2021    Pneumococcal Conjugate - 13 Valent 06/09/2017    Pneumococcal Polysaccharide - 23 Valent 08/12/2014    Tdap 06/09/2017       Family History       Problem Relation (Age of Onset)    Cancer Mother    Cataracts Father    Diabetes Mother, Sister    Heart disease Father, Sister    Hypertension Mother, Father, Maternal Aunt    No Known Problems Brother, Sister, Maternal Uncle, Paternal Aunt, Paternal Uncle, Maternal Grandmother, Maternal Grandfather, Paternal Grandmother, Paternal Grandfather          Social History     Socioeconomic History    Marital status: Single    Number of children: 0   Occupational History    Occupation: Retired     Employer: [employer]   Tobacco Use    Smoking status: Never    Smokeless tobacco: Never   Substance and Sexual Activity    Alcohol use: No    Drug use: No    Sexual activity: Yes     Partners: Female     Birth control/protection: Condom     Social Drivers of Health     Financial Resource Strain: Patient Declined (10/16/2024)    Overall Financial Resource Strain (CARDIA)     Difficulty of Paying Living Expenses: Patient declined   Food Insecurity: Patient Declined (10/16/2024)    Hunger Vital Sign     Worried About Running Out of Food in the Last Year: Patient declined     Ran Out of Food in the Last Year: Patient declined   Transportation Needs: Patient Declined (10/16/2024)    TRANSPORTATION NEEDS     Transportation : Patient declined   Physical Activity: Unknown (7/2/2024)    Exercise Vital Sign     Days of Exercise per Week: 0 days   Recent Concern: Physical Activity - Inactive  (7/2/2024)    Exercise Vital Sign     Days of Exercise per Week: 0 days     Minutes of Exercise per Session: 0 min   Stress: Patient Declined (10/16/2024)    [place] [employer] - Occupational Stress Questionnaire     Feeling of Stress : Patient declined   Housing Stability: Patient Declined (10/16/2024)    Housing Stability Vital Sign     Unable to Pay for Housing in the Last Year: Patient declined     Homeless in the Last Year: Patient declined     Review of Systems   Constitutional:  Negative for chills and fever.   HENT:  Negative for sore throat.    Respiratory:  Negative for cough and shortness of breath.    Cardiovascular:  Negative for chest pain.   Gastrointestinal:  Negative for abdominal pain, diarrhea, nausea and vomiting.   Skin:  Positive for wound (L foot). Negative for rash.     Objective:     Vital Signs (Most Recent):  Temp: 97.9 °F (36.6 °C) (10/21/24 1158)  Pulse: (!) 125 (10/21/24 1447)  Resp: 18 (10/21/24 1158)  BP: (!) 148/76 (10/21/24 1158)  SpO2: 97 % (10/21/24 1448) Vital Signs (24h Range):  Temp:  [97.9 °F (36.6 °C)-98.7 °F (37.1 °C)] 97.9 °F (36.6 °C)  Pulse:  [] 125  Resp:  [18] 18  SpO2:  [97 %-100 %] 97 %  BP: (103-148)/(65-86) 148/76     Weight: 97.5 kg (214 lb 15.2 oz)  Body mass index is 28.36 kg/m².    Estimated Creatinine Clearance: 92.2 mL/min (based on SCr of 1.1 mg/dL).     Physical Exam  Constitutional:       General: He is not in acute distress.     Appearance: Normal appearance. He is not ill-appearing.   HENT:      Head: Normocephalic and atraumatic.      Nose: Nose normal.      Mouth/Throat:      Mouth: Mucous membranes are moist.   Eyes:      Conjunctiva/sclera: Conjunctivae normal.   Cardiovascular:      Rate and Rhythm: Regular rhythm. Tachycardia present.      Pulses: Normal pulses.      Heart sounds: No murmur heard.  Pulmonary:      Effort: Pulmonary effort is normal. No respiratory distress.      Breath sounds: Normal breath sounds.    Abdominal:      Palpations: Abdomen is soft.      Tenderness: There is no abdominal tenderness.   Musculoskeletal:      Right lower leg: No edema.   Skin:     General: Skin is warm.      Capillary Refill: Capillary refill takes less than 2 seconds.      Comments: L foot wrapped with bandage and ace wrap. No pain, however patient has decreased sensation to foot due to neuropathy.   Neurological:      Mental Status: He is alert and oriented to person, place, and time.          Significant Labs: Blood Culture:   Recent Labs   Lab 10/16/24  1221 10/18/24  1245 10/18/24  1311   LABBLOO Gram stain mary bottle: Gram negative rods  Results called to and read back by:Alex ECHOLS 10/17/2024  06:29  KLEBSIELLA AEROGENES *  Gram stain aer bottle: Gram negative rods  Positive results previously called 10/18/2024  06:28  PSEUDOMONAS AERUGINOSA* No Growth to date  No Growth to date  No Growth to date  No Growth to date No Growth to date  No Growth to date  No Growth to date     CBC:   Recent Labs   Lab 10/20/24  0519 10/21/24  0340   WBC 5.66 5.66   HGB 10.1* 10.1*   HCT 32.3* 32.7*    332     Wound Culture:   Recent Labs   Lab 05/17/24  0840 06/25/24  1630 07/05/24  0910 10/11/24  0925   LABAERO ENTEROCOCCUS FAECALIS  Few  * No growth No significant isolate PSEUDOMONAS AERUGINOSA  Moderate  *       Significant Imaging: I have reviewed all pertinent imaging results/findings within the past 24 hours.

## 2024-10-21 NOTE — HPI
54 year old male with history of DMT2 (Alc 11% 10/16/2024), colon adenocarcinoma (s/p resection in 2022), HTN, HLD, and numerous episodes of foot osteomyelitis with L foot chronic osteomyelitis. Presented to ER on 10/16/2024 with AMS and hematemesis. Glucose of 375 on admit and admitted for management of DKA and possible GI bleed. DKA resolved and no recurrence of hematemesis in hospital. Hb stable. GI suspected Christel-Yanes tear vs vomiting ingested blood from recent dental procedure. Recent bone culture of L foot (10/11/2024) grew Pseudomonas aeruginosa. Bcx (10/16/2024) grew Pseudomonas aeruginosa and Klebsiella aerogenes  (sens to Cefepime, only deemed  due to intermediate kash to Imipenem). Patient started on IV Cefepime. Left foot X-ray (10/18/2024) notable for acute OM of the 3rd metatarsal base, cuboid, and lateral cuneiform. Podiatry consulted, L foot debrided at bedside (10/17/2024), and no role for surgical intervention.  ID consulted for final antibiotic recs for acute osteomyelitis and bacteremia.    Patient follows closely with podiatry and wound care for chronic osteo. Historically bone cx 7/2023 grew staph lugdunensis and E. faecalis. Wound cx 4/2/24 grew pseudomonas and klebsiella. Bone biopsy 5/17/24 revealed chronic osteomyelitis; bone cx grew enterococcus faecalis, prescribed levofloxacin. Bone cx 7/5/24 w/o growth. Patient recently completed 8 weeks of IV Dapto for chronic osteo. He was seen in ID clinic on 9/10/2024 and ended IV abx 9/25/2024. Patient states he was going to all his wound care and podiatry appointments, but his foot seemed to be worsen within days prior to admission. He was on oral antibiotics per his dentist for a dental infection when he saw podiatry in clinic on 10/11/2024. Wound was debrided and bone was sent for culture and pathology as podiatry noted significant regression of wound with exposed bone. Podiatry prescribed PO Cipro BID x10 days on 10/14/24, but patient  never took before he presented to the ER two days later.     Patient states he is not sure why is foot wounds worsened so quickly. He felt he was doing well after completing the IV antibiotics. He denies any new trauma to foot or water exposure. He uses a scooter and does not bear weight on L foot. Denies fever or chills, nausea or vomiting, or any pain anywhere. Notes he cannot feel much in his feet due to neuropathy. He is amenable to prolonged course of antibiotics, but would prefer orals over IV.

## 2024-10-21 NOTE — PROGRESS NOTES
[facility] ([place] [address])  Podiatry  Progress Note    Patient Name: Indio Ryder Jr.  MRN: [id number]  Admission Date: 10/16/2024  Hospital Length of Stay: 5 days  Attending Physician: Roxi Pack MD  Primary Care Provider: Lorena Thakur MD     Subjective:     Interval History:  No adverse events overnight.  VSS, afebrile, WBC WNL, .  Patient sitting up in bed watching TV with CAM boot in place to L foot.  Patient has trash bag over L foot dressing from when he had to take a shower.  Patient denies fevers, chills, nausea, or vomiting.  Denies SOB or chest pain.  Denies any new pedal complaints.    Scheduled Meds:   ceFEPime IV (PEDS and ADULTS)  2 g Intravenous Q8H    heparin (porcine)  5,000 Units Subcutaneous Q8H    insulin glargine U-100 (Lantus)  26 Units Subcutaneous Nightly    k phos di & mono-sod phos mono  250 mg Oral BID    pantoprazole  40 mg Oral Daily     Continuous Infusions:  PRN Meds:  Current Facility-Administered Medications:     0.9%  NaCl infusion (for blood administration), , Intravenous, Q24H PRN    chlorproMAZINE, 25 mg, Oral, QID PRN    dextrose 10%, 12.5 g, Intravenous, PRN    dextrose 10%, 25 g, Intravenous, PRN    glucagon (human recombinant), 1 mg, Intramuscular, PRN    insulin aspart U-100, 0-10 Units, Subcutaneous, QID (AC + HS) PRN    ondansetron, 4 mg, Intravenous, Q6H PRN    sodium chloride 0.9%, 10 mL, Intravenous, PRN    Review of Systems   Constitutional:  Negative for fatigue and fever.   HENT: Negative.     Eyes: Negative.    Respiratory: Negative.     Cardiovascular: Negative.    Gastrointestinal: Negative.    Endocrine: Negative.    Genitourinary: Negative.    Musculoskeletal:  Positive for gait problem.   Skin:  Positive for color change and wound.        Reports wounds to his L foot   Allergic/Immunologic: Negative.    Hematological: Negative.    Psychiatric/Behavioral: Negative.       Objective:     Vital Signs (Most Recent):  Temp: 98 °F  (36.7 °C) (10/21/24 0803)  Pulse: 89 (10/21/24 0803)  Resp: 18 (10/21/24 0803)  BP: (!) 146/86 (10/21/24 0803)  SpO2: 98 % (10/21/24 0803) Vital Signs (24h Range):  Temp:  [97.6 °F (36.4 °C)-98.7 °F (37.1 °C)] 98 °F (36.7 °C)  Pulse:  [] 89  Resp:  [18-20] 18  SpO2:  [97 %-100 %] 98 %  BP: (103-146)/(65-86) 146/86     Weight: 97.5 kg (214 lb 15.2 oz)  Body mass index is 28.36 kg/m².    Physical Exam     Constitutional:       Appearance: He is well-developed.      Cardiovascular:      Comments: Dorsalis pedis and posterior tibial pulses are palpable Skin is atrophic, slightly hyperpigmented, and mildly edematous     Musculoskeletal:         General: No tenderness. Normal range of motion.      Comments: Muscle strength is 5/5 in all groups bilaterally.     S/p partial 5th ray amp b/l  S/p hallux amp right  Fat pad atrophy to heels and met heads bilateral     Skin:     General: Skin is warm and dry.      Coloration: Skin is not jaundiced, mottled or sallow.      Findings: Wound (see below) present. No abscess or ecchymosis.      Comments:  2 wounds noted to plantar L foot.  Wound base mixture of granular and necrotic tissue.  Positive probe to bone.  Malodor, crepitus, purulent drainage, erythema, and swelling noted.     Measurment: 6.2 x 3.3 x 0.4 cm (proximal wound), 1.5 x 1.5 x 0.2 cm(distal wound)     Neurological:      Mental Status: He is alert and oriented to person, place, and time.      Comments: [place] 5.07 monofilamant testing is diminished Kenji feet. Sharp/dull sensation diminished Bilaterally. Light touch absent Bilaterally.     Psychiatric:         Behavior: Behavior normal.    Comments:  10/21:  New maceration noted to distal L digits and plantar L foot periwound.  Wounds to plantar L foot stable without any necrotic tissue.  Improvements of serous drainage.  No purulent drainage noted.    Laboratory:  BMP:   Recent Labs   Lab 10/21/24  0340   *      K 3.1*      CO2 27    BUN 12   CREATININE 1.1   CALCIUM 9.7   MG 1.8     CBC:   Recent Labs   Lab 10/21/24  0340   WBC 5.66   RBC 3.63*   HGB 10.1*   HCT 32.7*      MCV 90   MCH 27.8   MCHC 30.9*     Diagnostic Results:  I have reviewed all pertinent imaging results/findings within the past 24 hours.    Clinical Findings:  L plantar foot wound    New maceration to L forefoot      Assessment/Plan:     ID  Chronic osteomyelitis of left foot  Assesment  Wounds to plantar L foot.  Wound base mixture of granular and necrotic tissue.  Positive probe to bone test.  Wound with purulent drainage, malodor, erythema, swelling, bone exposure, crepitus, and undermining.  No tenderness to palpation.  XR L foot shows acute OM to 3rd met base, cuboid, and lateral cuneiform.  Bone cx 10/11 +pseudomonas. IDSA mild/moderate foot wound infection. s/p Left foot debridement, patient doing well.    Plan  -Physical exam and imaging findings discussed with the patient.  Discussed with the patient that he has OM to his L foot.  Bone cx that were collected in Dr. Peterson clinic +pseudo.  -Recommends continuing with abx and extensive wound care.  No surgical intervention from Podiatry perspective.  Patient okay to discharge  -New maceration to L forefoot and periwound most likely from condensation from wearing trashbag. Advised patient to remove trash bag from L foot after showering and allow his L foot to air dry.  -Abx per Primary  -L forefoot painted with Betadine and left open to air dry. L plantar foot wounds dressed betadine soaked gauze, abd pad, Kerlix, and ACE wrap  -Wound care orders updated  -[facility] in [facility]  -Podiatry will continue to follow    Discharge Recommendations  -Patient to follow up in outpatient Podiatry clinic with Dr. Peterson. Podiatry will schedule  -Antibiotics per Primary  -HH/SNF to do dressings 2-3 times a week as follows:  Rinse L foot wound with Vashe and pat dry.  Apply Hydrofera blue, cast padding, and ACE wrap.  -Weight  bearing as tolerated in CAM boot  -Keep dressings clean, dry, and intact until follow-up appointment     Ida Rudd DPM  Podiatry  [facility] ([place]

## 2024-10-21 NOTE — CONSULTS
[facility] ([place] [address])  Wound Care    Patient Name:  Indio Ryder Jr.   MRN:  [id number]  Date: 10/21/2024  Diagnosis: Bacteremia    History:     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    Right foot    Colon adenocarcinoma 04/12/2022    Diabetic ketoacidosis without coma associated with type 2 diabetes mellitus 05/30/2017    Diabetic ulcer of right foot associated with type 2 diabetes mellitus 06/03/2015    Disorder of kidney and ureter     Essential hypertension 06/06/2013    Group B streptococcal infection 01/13/2017    Mixed hyperlipidemia 08/12/2014    Septic arthritis of left foot 04/28/2021    Shoulder impingement 08/12/2014    Subacute osteomyelitis of right foot 01/12/2017    Streptococcus agalactiae, Actinomyces odontolyticus    Traumatic amputation of fifth toe of right foot 07/02/2015    Type 2 diabetes mellitus with diabetic neuropathy, with long-term current use of insulin 05/03/2016       Social History     Socioeconomic History    Marital status: Single    Number of children: 0   Occupational History    Occupation: Retired     Employer: [employer]   Tobacco Use    Smoking status: Never    Smokeless tobacco: Never   Substance and Sexual Activity    Alcohol use: No    Drug use: No    Sexual activity: Yes     Partners: Female     Birth control/protection: Condom     Social Drivers of Health     Financial Resource Strain: Patient Declined (10/16/2024)    Overall Financial Resource Strain (CARDIA)     Difficulty of Paying Living Expenses: Patient declined   Food Insecurity: Patient Declined (10/16/2024)    Hunger Vital Sign     Worried About Running Out of Food in the Last Year: Patient declined     Ran Out of Food in the Last Year: Patient declined   Transportation Needs: Patient Declined (10/16/2024)    TRANSPORTATION NEEDS     Transportation : Patient declined   Physical Activity: Unknown (7/2/2024)    Exercise Vital Sign     Days of Exercise  per Week: 0 days   Recent Concern: Physical Activity - Inactive (7/2/2024)    Exercise Vital Sign     Days of Exercise per Week: 0 days     Minutes of Exercise per Session: 0 min   Stress: Patient Declined (10/16/2024)    [employer] [place] [employer] - Occupational Stress Questionnaire     Feeling of Stress : Patient declined   Housing Stability: Patient Declined (10/16/2024)    Housing Stability Vital Sign     Unable to Pay for Housing in the Last Year: Patient declined     Homeless in the Last Year: Patient declined       Precautions:     Allergies as of 10/16/2024    (No Known Allergies)       WO Assessment Details/Treatment     Inpatient wound care consult received. Per chart review, patient was initially seen on 10/17 by podiatry with photos, wound care orders, and plans to continue to follow the patient. Inpatient wound care defers to podiatry. No other issues or concerns at this time. Please re-consult if needed.       10/21/24 0740   WOCN Assessment   WOCN Total Time (mins) 15   Visit Date 10/21/24   Visit Time 0740   Consult Type New   WOCN Speciality Wound   Intervention chart review;coordination of care       Orders placed.   Jaden JONESN, RN, [facility]  10/21/2024

## 2024-10-21 NOTE — PT/OT/SLP PROGRESS
Physical Therapy      Patient Name:  Indio Ryder Jr.   MRN:  [id number]    PT orders received. PT to bedside for PT evaluation. MD at bedside. PT unable to return for second attempt; will continue to monitor and to return when appropriate.

## 2024-10-21 NOTE — ASSESSMENT & PLAN NOTE
-previous cultures prior to admission with Podiatry clinic growing Pseudomonas   -s/p Debridement with Podiatry, no plans for acute surgical intervention   -noted in the chart patient recently completely IV Daptomycin treatment for OM and has had multiple repeated infections   -continue IV Cefepime at this time   -wound care daily   -will consult ID for course duration and final abx recs given history of repeat infections and poor source control once susceptibilities have finalized   -will arrange outpatient wound care dressing changes closer to time of discharge

## 2024-10-21 NOTE — SUBJECTIVE & OBJECTIVE
Interval History: Patient seen and examined. NAEO. No longer having hiccups. Has no significant pain.       Objective:     Vital Signs (Most Recent):  Temp: 98.7 °F (37.1 °C) (10/21/24 0349)  Pulse: 104 (10/21/24 0349)  Resp: 18 (10/21/24 0349)  BP: 128/74 (10/21/24 0349)  SpO2: 100 % (10/21/24 0349) Vital Signs (24h Range):  Temp:  [97.6 °F (36.4 °C)-98.7 °F (37.1 °C)] 98.7 °F (37.1 °C)  Pulse:  [] 104  Resp:  [18-20] 18  SpO2:  [97 %-100 %] 100 %  BP: (103-133)/(65-84) 128/74     Weight: 97.5 kg (214 lb 15.2 oz)  Body mass index is 28.36 kg/m².    Intake/Output Summary (Last 24 hours) at 10/21/2024 0757  Last data filed at 10/21/2024 0100  Gross per 24 hour   Intake 1380 ml   Output 2700 ml   Net -1320 ml         Physical Exam  Constitutional:       General: He is not in acute distress.     Appearance: Normal appearance. He is not toxic-appearing.   HENT:      Head: Normocephalic and atraumatic.   Eyes:      Pupils: Pupils are equal, round, and reactive to light.   Cardiovascular:      Rate and Rhythm: Normal rate and regular rhythm.   Pulmonary:      Effort: Pulmonary effort is normal.      Breath sounds: Normal breath sounds.   Abdominal:      General: Abdomen is flat.      Palpations: Abdomen is soft.   Musculoskeletal:         General: Normal range of motion.      Cervical back: Normal range of motion.   Skin:     General: Skin is warm and dry.      Comments: Extremity wrapped/bandaged    Neurological:      General: No focal deficit present.   Psychiatric:         Mood and Affect: Mood normal.             Significant Labs: All pertinent labs within the past 24 hours have been reviewed.    Significant Imaging: I have reviewed all pertinent imaging results/findings within the past 24 hours.

## 2024-10-22 VITALS
BODY MASS INDEX: 28.49 KG/M2 | SYSTOLIC BLOOD PRESSURE: 109 MMHG | WEIGHT: 214.94 LBS | HEART RATE: 99 BPM | TEMPERATURE: 98 F | DIASTOLIC BLOOD PRESSURE: 63 MMHG | RESPIRATION RATE: 18 BRPM | OXYGEN SATURATION: 99 % | HEIGHT: 73 IN

## 2024-10-22 LAB
ANION GAP SERPL CALC-SCNC: 10 MMOL/L (ref 8–16)
BACTERIA BLD CULT: ABNORMAL
BASOPHILS # BLD AUTO: 0.02 K/UL (ref 0–0.2)
BASOPHILS NFR BLD: 0.4 % (ref 0–1.9)
BUN SERPL-MCNC: 13 MG/DL (ref 6–20)
CALCIUM SERPL-MCNC: 9.5 MG/DL (ref 8.7–10.5)
CHLORIDE SERPL-SCNC: 99 MMOL/L (ref 95–110)
CO2 SERPL-SCNC: 31 MMOL/L (ref 23–29)
CREAT SERPL-MCNC: 1 MG/DL (ref 0.5–1.4)
DIFFERENTIAL METHOD BLD: ABNORMAL
EOSINOPHIL # BLD AUTO: 0 K/UL (ref 0–0.5)
EOSINOPHIL NFR BLD: 0.7 % (ref 0–8)
ERYTHROCYTE [DISTWIDTH] IN BLOOD BY AUTOMATED COUNT: 13.9 % (ref 11.5–14.5)
EST. GFR  (NO RACE VARIABLE): >60 ML/MIN/1.73 M^2
GLUCOSE SERPL-MCNC: 112 MG/DL (ref 70–110)
HCT VFR BLD AUTO: 33.1 % (ref 40–54)
HGB BLD-MCNC: 10.2 G/DL (ref 14–18)
IMM GRANULOCYTES # BLD AUTO: 0.03 K/UL (ref 0–0.04)
IMM GRANULOCYTES NFR BLD AUTO: 0.5 % (ref 0–0.5)
LYMPHOCYTES # BLD AUTO: 1.7 K/UL (ref 1–4.8)
LYMPHOCYTES NFR BLD: 30.4 % (ref 18–48)
MAGNESIUM SERPL-MCNC: 1.7 MG/DL (ref 1.6–2.6)
MCH RBC QN AUTO: 27.8 PG (ref 27–31)
MCHC RBC AUTO-ENTMCNC: 30.8 G/DL (ref 32–36)
MCV RBC AUTO: 90 FL (ref 82–98)
MONOCYTES # BLD AUTO: 0.6 K/UL (ref 0.3–1)
MONOCYTES NFR BLD: 9.8 % (ref 4–15)
NEUTROPHILS # BLD AUTO: 3.3 K/UL (ref 1.8–7.7)
NEUTROPHILS NFR BLD: 58.2 % (ref 38–73)
NRBC BLD-RTO: 0 /100 WBC
PHOSPHATE SERPL-MCNC: 2.2 MG/DL (ref 2.7–4.5)
PLATELET # BLD AUTO: 323 K/UL (ref 150–450)
PMV BLD AUTO: 9.5 FL (ref 9.2–12.9)
POCT GLUCOSE: 110 MG/DL (ref 70–110)
POCT GLUCOSE: 56 MG/DL (ref 70–110)
POTASSIUM SERPL-SCNC: 3.4 MMOL/L (ref 3.5–5.1)
RBC # BLD AUTO: 3.67 M/UL (ref 4.6–6.2)
SODIUM SERPL-SCNC: 140 MMOL/L (ref 136–145)
WBC # BLD AUTO: 5.62 K/UL (ref 3.9–12.7)

## 2024-10-22 PROCEDURE — 25000003 PHARM REV CODE 250: Mod: HCNC | Performed by: STUDENT IN AN ORGANIZED HEALTH CARE EDUCATION/TRAINING PROGRAM

## 2024-10-22 PROCEDURE — 36415 COLL VENOUS BLD VENIPUNCTURE: CPT | Mod: HCNC

## 2024-10-22 PROCEDURE — 97161 PT EVAL LOW COMPLEX 20 MIN: CPT | Mod: HCNC

## 2024-10-22 PROCEDURE — 85025 COMPLETE CBC W/AUTO DIFF WBC: CPT | Mod: HCNC | Performed by: STUDENT IN AN ORGANIZED HEALTH CARE EDUCATION/TRAINING PROGRAM

## 2024-10-22 PROCEDURE — 84100 ASSAY OF PHOSPHORUS: CPT | Mod: HCNC

## 2024-10-22 PROCEDURE — 80048 BASIC METABOLIC PNL TOTAL CA: CPT | Mod: HCNC | Performed by: STUDENT IN AN ORGANIZED HEALTH CARE EDUCATION/TRAINING PROGRAM

## 2024-10-22 PROCEDURE — 97535 SELF CARE MNGMENT TRAINING: CPT | Mod: HCNC

## 2024-10-22 PROCEDURE — 63600175 PHARM REV CODE 636 W HCPCS: Mod: HCNC

## 2024-10-22 PROCEDURE — 83735 ASSAY OF MAGNESIUM: CPT | Mod: HCNC | Performed by: STUDENT IN AN ORGANIZED HEALTH CARE EDUCATION/TRAINING PROGRAM

## 2024-10-22 RX ORDER — PANTOPRAZOLE SODIUM 40 MG/1
40 TABLET, DELAYED RELEASE ORAL DAILY
Qty: 90 TABLET | Refills: 0 | Status: ON HOLD | OUTPATIENT
Start: 2024-10-22 | End: 2025-10-22

## 2024-10-22 RX ORDER — LANCETS 33 GAUGE
1 EACH MISCELLANEOUS 4 TIMES DAILY
Qty: 100 EACH | Refills: 0 | Status: ON HOLD | OUTPATIENT
Start: 2024-10-22

## 2024-10-22 RX ORDER — INSULIN GLARGINE 300 [IU]/ML
INJECTION, SOLUTION SUBCUTANEOUS
Qty: 2 PEN | Refills: 6 | Status: ON HOLD | OUTPATIENT
Start: 2024-10-22

## 2024-10-22 RX ORDER — CIPROFLOXACIN 750 MG/1
750 TABLET, FILM COATED ORAL EVERY 12 HOURS
Qty: 84 TABLET | Refills: 0 | Status: ON HOLD | OUTPATIENT
Start: 2024-10-22 | End: 2024-12-03

## 2024-10-22 RX ORDER — PEN NEEDLE, DIABETIC 30 GX3/16"
NEEDLE, DISPOSABLE MISCELLANEOUS
Qty: 400 EACH | Refills: 3 | Status: ON HOLD | OUTPATIENT
Start: 2024-10-22

## 2024-10-22 RX ORDER — INSULIN ASPART 100 [IU]/ML
INJECTION, SOLUTION INTRAVENOUS; SUBCUTANEOUS
Qty: 15 ML | Refills: 2 | Status: ON HOLD | OUTPATIENT
Start: 2024-10-22

## 2024-10-22 RX ORDER — DEXTROSE 4 G
TABLET,CHEWABLE ORAL
Qty: 1 EACH | Refills: 1 | Status: ON HOLD | OUTPATIENT
Start: 2024-10-22

## 2024-10-22 RX ORDER — IBUPROFEN 200 MG
16 TABLET ORAL
Status: DISCONTINUED | OUTPATIENT
Start: 2024-10-22 | End: 2024-10-22 | Stop reason: HOSPADM

## 2024-10-22 RX ADMIN — PANTOPRAZOLE SODIUM 40 MG: 40 TABLET, DELAYED RELEASE ORAL at 08:10

## 2024-10-22 RX ADMIN — CIPROFOLXACIN 750 MG: 250 TABLET ORAL at 08:10

## 2024-10-22 RX ADMIN — HEPARIN SODIUM 5000 UNITS: 5000 INJECTION INTRAVENOUS; SUBCUTANEOUS at 05:10

## 2024-10-22 NOTE — PLAN OF CARE
Problem: Adult Inpatient Plan of Care  Goal: Plan of Care Review  10/22/2024 0432 by Carla Abel RN  Outcome: Progressing  10/22/2024 0432 by Carla Abel RN  Outcome: Progressing  Goal: Patient-Specific Goal (Individualized)  10/22/2024 0432 by Carla Abel RN  Outcome: Progressing  10/22/2024 0432 by Carla Abel RN  Outcome: Progressing  Goal: Absence of Hospital-Acquired Illness or Injury  10/22/2024 0432 by Carla Abel RN  Outcome: Progressing  10/22/2024 0432 by Carla Abel RN  Outcome: Progressing  Goal: Optimal Comfort and Wellbeing  10/22/2024 0432 by Carla Abel RN  Outcome: Progressing  10/22/2024 0432 by Carla Abel RN  Outcome: Progressing  Goal: Readiness for Transition of Care  10/22/2024 0432 by Carla Abel RN  Outcome: Progressing  10/22/2024 0432 by Carla Abel RN  Outcome: Progressing     Problem: Infection  Goal: Absence of Infection Signs and Symptoms  10/22/2024 0432 by Carla Abel RN  Outcome: Progressing  10/22/2024 0432 by Carla Abel RN  Outcome: Progressing     Problem: Diabetes Comorbidity  Goal: Blood Glucose Level Within Targeted Range  10/22/2024 0432 by Carla Abel RN  Outcome: Progressing  10/22/2024 0432 by Carla Abel RN  Outcome: Progressing     Problem: Acute Kidney Injury/Impairment  Goal: Fluid and Electrolyte Balance  10/22/2024 0432 by Carla Abel RN  Outcome: Progressing  10/22/2024 0432 by Carla Abel RN  Outcome: Progressing  Goal: Improved Oral Intake  10/22/2024 0432 by Carla Abel RN  Outcome: Progressing  10/22/2024 0432 by Carla Abel RN  Outcome: Progressing  Goal: Effective Renal Function  10/22/2024 0432 by Carla Abel RN  Outcome: Progressing  10/22/2024 0432 by Carla Abel RN  Outcome: Progressing     Problem: Wound  Goal: Optimal Coping  10/22/2024 0432 by Carla Abel RN  Outcome: Progressing  10/22/2024 0432 by Carla Abel RN  Outcome: Progressing  Goal: Optimal Functional Ability  10/22/2024 0432  by Colten, Carla, RN  Outcome: Progressing  10/22/2024 0432 by Carla Abel RN  Outcome: Progressing  Goal: Absence of Infection Signs and Symptoms  10/22/2024 0432 by Carla Abel RN  Outcome: Progressing  10/22/2024 0432 by Carla Abel RN  Outcome: Progressing  Goal: Improved Oral Intake  10/22/2024 0432 by Carla Abel RN  Outcome: Progressing  10/22/2024 0432 by Carla Abel RN  Outcome: Progressing  Goal: Optimal Pain Control and Function  10/22/2024 0432 by Carla Abel RN  Outcome: Progressing  10/22/2024 0432 by Carla Abel RN  Outcome: Progressing  Goal: Skin Health and Integrity  10/22/2024 0432 by Carla Abel RN  Outcome: Progressing  10/22/2024 0432 by Carla Abel RN  Outcome: Progressing  Goal: Optimal Wound Healing  10/22/2024 0432 by Carla Abel RN  Outcome: Progressing  10/22/2024 0432 by Carla Abel RN  Outcome: Progressing     Problem: Sepsis/Septic Shock  Goal: Optimal Coping  10/22/2024 0432 by Carla Abel RN  Outcome: Progressing  10/22/2024 0432 by Carla Abel RN  Outcome: Progressing  Goal: Absence of Bleeding  10/22/2024 0432 by Carla Abel RN  Outcome: Progressing  10/22/2024 0432 by Carla Abel RN  Outcome: Progressing  Goal: Blood Glucose Level Within Targeted Range  10/22/2024 0432 by Carla Abel RN  Outcome: Progressing  10/22/2024 0432 by Carla Abel RN  Outcome: Progressing  Goal: Absence of Infection Signs and Symptoms  10/22/2024 0432 by Carla Abel RN  Outcome: Progressing  10/22/2024 0432 by Carla Abel RN  Outcome: Progressing  Goal: Optimal Nutrition Intake  10/22/2024 0432 by Carla Abel RN  Outcome: Progressing  10/22/2024 0432 by Carla Abel, RN  Outcome: Progressing      No

## 2024-10-22 NOTE — PLAN OF CARE
[facility] ([place] [address])      HOME HEALTH ORDERS  FACE TO FACE ENCOUNTER    Patient Name: Indio Ryder Jr.  YOB: 1970    PCP: Lorena Thakur MD   PCP Address: [address] / [place] [address]  PCP Phone Number: [phone]  PCP Fax: [phone]    Encounter Date: 10/16/24    Admit to Home Health    Diagnoses:  Active Hospital Problems    Diagnosis  POA    Chronic osteomyelitis of left foot [M86.672]  Yes     Priority: 2     Type 2 diabetes mellitus with hyperglycemia, with long-term current use of insulin [E11.65, Z79.4]  Not Applicable     Priority: 4     History of colon cancer [Z85.038]  Yes    Mixed hyperlipidemia [E78.2]  Yes      Resolved Hospital Problems    Diagnosis Date Resolved POA    *Bacteremia [R78.81] 10/21/2024 No     Priority: 1 - High    Severe sepsis [A41.9, R65.20] 10/21/2024 Yes     Priority: 2     Encephalopathy, metabolic [G93.41] 10/21/2024 Yes     Priority: 5     TAHIR (acute kidney injury) [N17.9] 10/21/2024 Yes     Priority: 5     Hematemesis [K92.0] 10/21/2024 Yes     Priority: 6     DKA (diabetic ketoacidosis) [E11.10] 10/21/2024 Yes     Priority: 6     Hypernatremia [E87.0] 10/21/2024 Yes     Priority: 7     Acute GI bleeding [K92.2] 10/17/2024 Yes       Follow Up Appointments:  Future Appointments   Date Time Provider Department Center   10/25/2024 11:00 AM Marivel Peterson DPM [facility] WOUND [facility]   11/11/2024  2:15 PM Daniel Reyes DPM [facility] PODIAT [facility]   1/7/2025  2:30 PM Margaux Mercer MD [facility]OCR [facility]       Allergies:Review of patient's allergies indicates:  No Known Allergies    Medications: Review discharge medications with patient and family and provide education.    Current Facility-Administered Medications   Medication Dose Route Frequency Provider Last Rate Last Admin    0.9%  NaCl infusion (for blood administration)   Intravenous Q24H PRN Syed Louis MD        chlorproMAZINE tablet 25 mg  25 mg  Oral QID PRN Roxi Pack MD   25 mg at 10/19/24 2006    ciprofloxacin HCl tablet 750 mg  750 mg Oral Q12H Viji Marquez MD   750 mg at 10/22/24 0853    dextrose 10% bolus 125 mL 125 mL  12.5 g Intravenous PRN Syed Louis MD        dextrose 10% bolus 250 mL 250 mL  25 g Intravenous PRN Syed Louis MD        glucagon (human recombinant) injection 1 mg  1 mg Intramuscular PRN Mahnaz Maldonadot, DO        glucose chewable tablet 16 g  16 g Oral PRN Roxi Pack MD        heparin (porcine) injection 5,000 Units  5,000 Units Subcutaneous Q8H Toshia Maldonado, DO   5,000 Units at 10/22/24 0547    insulin aspart U-100 pen 0-10 Units  0-10 Units Subcutaneous QID (AC + HS) PRN Mahnaz Maldonadot, DO   2 Units at 10/21/24 2110    insulin glargine U-100 (Lantus) pen 26 Units  26 Units Subcutaneous Nightly Marya Arevalo MD   26 Units at 10/21/24 2107    ondansetron injection 4 mg  4 mg Intravenous Q6H PRN Henry Snyder MD   4 mg at 10/17/24 1351    pantoprazole EC tablet 40 mg  40 mg Oral Daily Roxi Pack MD   40 mg at 10/22/24 0853    sodium chloride 0.9% flush 10 mL  10 mL Intravenous PRN Syed Louis MD         Current Discharge Medication List        START taking these medications    Details   blood-glucose meter Misc Use as instructed  Qty: 1 each, Refills: 1      blood sugar diagnostic Strp use to test blood sugar 4 times daily  Qty: 100 each, Refills: 0      ciprofloxacin HCl (CIPRO) 750 MG tablet Take 1 tablet (750 mg total) by mouth every 12 (twelve) hours.  Qty: 84 tablet, Refills: 0    Comments: Per ID: From start date of 10/18/24-11/29/2024      pantoprazole (PROTONIX) 40 MG tablet Take 1 tablet (40 mg total) by mouth once daily.  Qty: 90 tablet, Refills: 0           CONTINUE these medications which have CHANGED    Details   insulin aspart U-100 (NOVOLOG) 100 unit/mL (3 mL) InPn pen Inject if sugar is > 180 up to 4x a day , 180-230+2, 231-280+4, 281-330+6,  "331-380+8, >380+10. Max daily 40 units  Qty: 15 mL, Refills: 2    Associated Diagnoses: Type 2 diabetes mellitus with hyperglycemia, with long-term current use of insulin      insulin glargine U-300 conc (TOUJEO MAX U-300 SOLOSTAR) 300 unit/mL (3 mL) insulin pen Inject 26 to 32 units under the skin at night  Qty: 2 Pen, Refills: 6      lancets 33 gauge Misc 1 lancet  by Misc.(Non-Drug; Combo Route) route 4 (four) times daily.  Qty: 100 each, Refills: 0    Comments: Lancets for patient, rewrite as needed or message for clarification      pen needle, diabetic (BD SASCHA 2ND GEN PEN NEEDLE) 32 gauge x 5/32" Ndle Use 4 times a day  Qty: 400 each, Refills: 3           CONTINUE these medications which have NOT CHANGED    Details   baclofen (LIORESAL) 10 MG tablet Take 1 tablet (10 mg total) by mouth nightly.  Qty: 30 tablet, Refills: 0      empagliflozin (JARDIANCE) 25 mg tablet Take 1 tablet (25 mg total) by mouth once daily.  Qty: 30 tablet, Refills: 8    Associated Diagnoses: Type 2 diabetes mellitus with hyperglycemia, with long-term current use of insulin           STOP taking these medications       ONETOUCH ULTRA2 kit Comments:   Reason for Stopping:                 I have seen and examined this patient within the last 30 days. My clinical findings that support the need for the home health skilled services and home bound status are the following:no   Patient with medication mismanagement issues requiring home bound status as evidenced by  Uncontrolled hyperglycemic/hypoglycemic events.  Wound care requiring multiple dressing changes.     Diet:   diabetic diet 2200 calorie    Labs:  CBC, CMP, CRP every 2 weeks       Referrals/ Consults   to evaluate for community resources/long-range planning.    Activities:   activity as tolerated    Nursing:   Agency to admit patient within 24 hours of hospital discharge unless specified on physician order or at patient request    SN to complete comprehensive assessment " including routine vital signs. Instruct on disease process and s/s of complications to report to MD. Review/verify medication list sent home with the patient at time of discharge  and instruct patient/caregiver as needed. Frequency may be adjusted depending on start of care date.     Skilled nurse to perform up to 3 visits PRN for symptoms related to diagnosis    When multiple disciplines ordered:    Start of Care occurs on Sunday - Wednesday schedule remaining discipline evaluations as ordered on separate consecutive days following the start of care.    Thursday SOC -schedule subsequent evaluations Friday and Monday the following week.     Friday - Saturday SOC - schedule subsequent discipline evaluations on consecutive days starting Monday of the following week.    Notify MD if SBP > 160 or < 90; DBP > 90 or < 50; HR > 120 or < 50; Temp > 101; O2 < 88%;    Ok to schedule additional visits based on staff availability and patient request on consecutive days within the home health episode.      Home Health Aide:  Nursing Other: 2-3x week for wound care and every 2 weeks for labs   HH to do dressings 2-3x week as follows below     Wound Care Orders  -Patient to follow up in outpatient Podiatry clinic with Dr. Peterson. Podiatry will schedule  -Antibiotics per Primary  -HH/SNF to do dressings 2-3 times a week as follows:  Rinse L foot wound with Vashe and pat dry.  Apply Hydrofera blue, cast padding, and ACE wrap.  -Weight bearing as tolerated in CAM boot  -Keep dressings clean, dry, and intact until follow-up appointmen      I certify that this patient is confined to his home and needs intermittent skilled nursing care.

## 2024-10-22 NOTE — PT/OT/SLP PROGRESS
Occupational Therapy   Treatment    Name: Indio Ryder Jr.  MRN: [id number]  Admitting Diagnosis:  Bacteremia       Recommendations:     Discharge Recommendations: No Therapy Indicated  Discharge Equipment Recommendations:  shower chair  Barriers to discharge:  Decreased caregiver support    Assessment:     Indio Ryder Jr. is a 54 y.o. male with a medical diagnosis of Bacteremia.  He presents with decreased LLE function and strength, decreased standing balance and tolerance, and limited participation in ADLs and functional mobility tasks. Pt with good tolerance to session on this date and is performing ADLs at supervision to mod I level. Pt is motivated to participate and would continue to benefit from skilled OT services to improve functional performance and increase ADL participation. Performance deficits affecting function are weakness, impaired endurance, impaired self care skills, impaired functional mobility, gait instability, impaired balance, decreased lower extremity function, decreased coordination, impaired cardiopulmonary response to activity, orthopedic precautions.     Rehab Prognosis:  Good; patient would benefit from acute skilled OT services to address these deficits and reach maximum level of function.       Plan:     Patient to be seen 3 x/week to address the above listed problems via self-care/home management, community/work re-entry, therapeutic exercises  Plan of Care Expires: 11/21/24  Plan of Care Reviewed with: patient    Subjective     Chief Complaint: No pertinent complaints   Patient/Family Comments/goals: to get stronger  Pain/Comfort:  Pain Rating 1: 0/10    Objective:     Communicated with: NSBISHNU prior to session.  Patient found HOB elevated with peripheral IV upon OT entry to room.    General Precautions: Standard, fall    Orthopedic Precautions:LLE weight bearing as tolerated (with CAMO boot on LLE)  Braces:  (CAM boot)  Respiratory Status: Room air     Occupational  Performance:     Bed Mobility:    Patient completed Supine to Sit with supervision     Functional Mobility/Transfers:  Patient completed Sit <> Stand Transfer with supervision  with  rolling walker   Patient completed Toilet Transfer Step Transfer technique with supervision with  rolling walker  Patient completed  Shower Transfer Step Transfer technique with supervision with rolling walker. Pt completed UB and LB dressing on shower bench.   Functional Mobility: Supervision with RW. Pt ambulated around room and retrieved clothing.     Activities of Daily Living:  Grooming: supervision standing at sink  Upper Body Dressing: supervision to retrieve clothing items  Lower Body Dressing: supervision for stand to don pants  Toileting: supervision for clothing management  -FWM- set up A.     [facility] 6 Click ADL: 21    Treatment & Education:  Role of OT  -OT Poc  -safety awareness and precautions  -Level of A required for ADLs and functional mobility  -AE used for ADL completion  -importance of OOB activity and energy conservation    -Using CAM boot while walking and how to don and doff boot correctly    Patient left up in chair with all lines intact and call button in reach    GOALS:   Multidisciplinary Problems       Occupational Therapy Goals          Problem: Occupational Therapy    Goal Priority Disciplines Outcome Interventions   Occupational Therapy Goal     OT, PT/OT Progressing    Description: Goals to be met by: 10/28/24    Patient will increase functional independence with ADLs by performing:    LE Dressing with Stand-by Assistance. MET  Grooming while standing at sink with Set-up Assistance. Ongoing  Toileting from toilet with [place] for hygiene and clothing management. Ongoing  Supine to sit with Modified [place]. Ongoing  Toilet transfer to toilet with Modified [place]. Ongoing                         Time Tracking:     OT Date of Treatment: 10/22/24  OT Start Time: 1024  OT Stop Time: 1048  OT  Total Time (min): 24 min    Billable Minutes:Self Care/Home Management 24 minutes    OT/NINFA: OT          10/22/2024

## 2024-10-22 NOTE — HPI
Indio Ryder Jr. is a 54-year-old male with a past medical history of diabetes with recurrent DKA, colon adenocarcinoma, hyperlipidemia, hypertension, shoulder impingement, osteomyelitis of right foot who presented with hematemesis associated with nausea and vomiting.  Per EMS, they report patient was lethargic, hypotensive and tachycardic, found at home with a bucket of blood next to him.  He reports emesis for the last 2 days associated with hematemesis.  Per EMS, blood sugar 330s.  Patient states that he has been feeling sick with nausea vomiting but no abdominal pain for the last 2 days.  He has a family member present at bedside.  She reports that she has not visited him and heard from him for the last 2 days, states he was his normal self on Monday morning. She believes he had some dental work with tooth extraction and a root canal done on Monday after she spoke with him.  He denies any fevers or chills, falls or trauma.  He denies any hematochezia or melena. At baseline he is independently capable of performing ADL's and drives. Currently lives alone.     In the ED, , , CO2 8, Glucose 375, BUN 39, Creatinine 3.1, Calcium 11.2, Alk Phos 166 AST 8, ALT 8, Anion gap 27, VBG 7.171/27/38/9.9/-19, hemoglobin 13.1, Beta hydroxybutyrate 4.3. He was treated with vanc/zosyn, PPI, octreotide, and insulin gtt has been started. Patient to be admitted to MICU for further management of GI bleed and DKA with resulting severe acidosis

## 2024-10-22 NOTE — DISCHARGE INSTRUCTIONS
You were treated for Diabetes and Acute on Chronic Osteomyelitis   We will be sending you home with an antibiotic called Ciprofloxacin please take as prescribed. Do not miss any doses.   You will need to check your Blood glucose frequently while on this medication as it can affect your blood glucose.  You will have follow up with the Podiatry team and will need to continue wound care to your extremity   I have placed home health orders to help facilitate your labs and wound care needs. If you have not heard I do recommend that you continue going to the wound care center   We will also have ID follow up after you completed your 6 weeks of antibiotics, this will be scheduled by them   You already have an Endocrinology appointment scheduled when I tried to place a referral. Please go to this appointment as I do believe it is very important an Endocrinologist sees you for your Diabetes.     Wound Care Orders  -Patient to follow up in outpatient Podiatry clinic with Dr. Peterson. Podiatry will schedule  -Antibiotics per Primary  -HH/SNF to do dressings 2-3 times a week as follows:  Rinse L foot wound with Vashe and pat dry.  Apply Hydrofera blue, cast padding, and ACE wrap.  -Weight bearing as tolerated in CAM boot  -Keep dressings clean, dry, and intact until follow-up appointmen  Labs to be done at [facility]   CBC, CMP, CRP every 2 weeks.

## 2024-10-22 NOTE — HOSPITAL COURSE
Patient admitted to MICU for management of AMS with significant acidosis in setting of DKA and sepsis. DKA treated with insulin gtt and fluids per DKA protocol. Inciting event likely infection vs insulin non-compliance. Left foot X-ray notable for acute OM of the 3rd metatarsal base, cuboid, and lateral cuneiform. Podiatry consulted, foot debrided at bedside, no role for surgical intervention. Blood cultures with GNRs. Treated with cefepime. GI consulted given reported hematemesis, however patient did not have recurrence of hematemesis on arrival [place] [facility] and hgb stable. Suspect valorie-king tear vs vomiting of ingested blood from recent dental procedure (had a tooth extraction two days prior to admission). No role for further intervention per GI.      Deemed appropriate for transfer to the floor on 10/18/2024, and was accepted to  team Q for further care and management    While on the floor patient was weaned from IV to PO PPI. He was continued on IV Cefepime while blood cultures were positive for Pseudomonas (likely due to acute on chronic osteomyelitis) and Klebsiella aerogenes while susceptiblities were pending. He was continued on home lantus and sliding scale with significant improvement in his blood sugars. He was continued to be followed by Podiatry who gave final recs on wound care and follow up. ID was consulted once speciation was completed on blood cultures for final antibiotic duration and monitoring. He was transtioned to PO Ciprofloxacin with plans for outpatient monitoring for labs. Discussion was had with patient about the importance of monitoring his blood glucoses while on this medication. Discussion about outpatient Endocrinology follow up but this referral was already placed and patient is scheduled. He will have outpatient Podiatry follow up scheduled by them and wound care follow up. Arrangements for home health was ordered but patient declined preferring to go to Wound care center for labs  and wound care management.     Pt deemed appropriate for discharge; seen and examined prior to departure. Plan discussed with pt, who was agreeable and amenable; medications were discussed and reviewed, outpatient follow-up scheduled, ER precautions were given, all questions were answered to the pt's satisfaction, and he was subsequently discharged.

## 2024-10-22 NOTE — NURSING
Pt BG 54 this AM without symptoms of hypoglycemia.  Pt requested to drink orange juice instead of the dextrose IV.  Dr. Pack notified, will recheck in 30 minutes.

## 2024-10-22 NOTE — DISCHARGE SUMMARY
[facility] - Stepdown Flex ([place] [address])  [facility] Medicine  Discharge Summary      Patient Name: Indio Ryder Jr.  MRN: [id number]  [facility]: [id number]  Patient Class: IP- Inpatient  Admission Date: 10/16/2024  Hospital Length of Stay: 6 days  Discharge Date and Time:  10/22/2024 3:03 PM  Attending Physician: Roxi Pack MD   Discharging Provider: Roxi Pack MD  Primary Care Provider: Lorena Thkaur MD  [facility] Medicine Team: [facility] HOSP MED Q Roxi Pack MD  Primary Care Team: [facility] HOSP MED Q    HPI:   Indio Ryder Jr. is a 54-year-old male with a past medical history of diabetes with recurrent DKA, colon adenocarcinoma, hyperlipidemia, hypertension, shoulder impingement, osteomyelitis of right foot who presented with hematemesis associated with nausea and vomiting.  Per EMS, they report patient was lethargic, hypotensive and tachycardic, found at home with a bucket of blood next to him.  He reports emesis for the last 2 days associated with hematemesis.  Per EMS, blood sugar 330s.  Patient states that he has been feeling sick with nausea vomiting but no abdominal pain for the last 2 days.  He has a family member present at bedside.  She reports that she has not visited him and heard from him for the last 2 days, states he was his normal self on Monday morning. She believes he had some dental work with tooth extraction and a root canal done on Monday after she spoke with him.  He denies any fevers or chills, falls or trauma.  He denies any hematochezia or melena. At baseline he is independently capable of performing ADL's and drives. Currently lives alone.     In the ED, , , CO2 8, Glucose 375, BUN 39, Creatinine 3.1, Calcium 11.2, Alk Phos 166 AST 8, ALT 8, Anion gap 27, VBG 7.171/27/38/9.9/-19, hemoglobin 13.1, Beta hydroxybutyrate 4.3. He was treated with vanc/zosyn, PPI, octreotide, and insulin gtt has been started. Patient to be admitted to MICU for further management of  GI bleed and DKA with resulting severe acidosis    * No surgery found *      Hospital Course:   Patient admitted to MICU for management of AMS with significant acidosis in setting of DKA and sepsis. DKA treated with insulin gtt and fluids per DKA protocol. Inciting event likely infection vs insulin non-compliance. Left foot X-ray notable for acute OM of the 3rd metatarsal base, cuboid, and lateral cuneiform. Podiatry consulted, foot debrided at bedside, no role for surgical intervention. Blood cultures with GNRs. Treated with cefepime. GI consulted given reported hematemesis, however patient did not have recurrence of hematemesis on arrival to [facility] and hgb stable. Suspect valorie-king tear vs vomiting of ingested blood from recent dental procedure (had a tooth extraction two days prior to admission). No role for further intervention per GI.      Deemed appropriate for transfer to the floor on 10/18/2024, and was accepted to  team Q for further care and management    While on the floor patient was weaned from IV to PO PPI. He was continued on IV Cefepime while blood cultures were positive for Pseudomonas (likely due to acute on chronic osteomyelitis) and Klebsiella aerogenes while susceptiblities were pending. He was continued on home lantus and sliding scale with significant improvement in his blood sugars. He was continued to be followed by Podiatry who gave final recs on wound care and follow up. ID was consulted once speciation was completed on blood cultures for final antibiotic duration and monitoring. He was transtioned to PO Ciprofloxacin with plans for outpatient monitoring for labs. Discussion was had with patient about the importance of monitoring his blood glucoses while on this medication. Discussion about outpatient Endocrinology follow up but this referral was already placed and patient is scheduled. He will have outpatient Podiatry follow up scheduled by them and wound care follow up. Arrangements  for home health was ordered but patient declined preferring to go to Wound care center for labs and wound care management.     Pt deemed appropriate for discharge; seen and examined prior to departure. Plan discussed with pt, who was agreeable and amenable; medications were discussed and reviewed, outpatient follow-up scheduled, ER precautions were given, all questions were answered to the pt's satisfaction, and he was subsequently discharged.        Goals of Care Treatment Preferences:  Code Status: Full Code      SDOH Screening:  The patient declined to be screened for utility difficulties, food insecurity, transport difficulties, housing insecurity, and interpersonal safety, so no concerns could be identified this admission.     Consults:   Consults (From admission, onward)          Status Ordering Provider     Inpatient consult to Infectious Diseases  Once        Provider:  (Not yet assigned)    Completed DENISSE GUTIERREZ     Inpatient consult to Podiatry  Once        Provider:  (Not yet assigned)    Completed ANGEL WEEMS     Inpatient consult to Gastroenterology  Once        Provider:  (Not yet assigned)    Completed IAIN COOPER     Inpatient consult to Critical Care Medicine  Once        Provider:  (Not yet assigned)    Completed IAIN COOPER            No new Assessment & Plan notes have been filed under this hospital service since the last note was generated.  Service: Hospital Medicine    Final Active Diagnoses:    Diagnosis Date Noted POA    Chronic osteomyelitis of left foot [M86.672] 10/20/2023 Yes    Type 2 diabetes mellitus with hyperglycemia, with long-term current use of insulin [E11.65, Z79.4] 05/03/2016 Not Applicable    History of colon cancer [Z85.038] 04/12/2022 Yes    Mixed hyperlipidemia [E78.2] 08/12/2014 Yes      Problems Resolved During this Admission:    Diagnosis Date Noted Date Resolved POA    PRINCIPAL PROBLEM:  Bacteremia [R78.81] 10/20/2024 10/21/2024 No    Severe sepsis  [A41.9, R65.20] 07/12/2019 10/21/2024 Yes    Encephalopathy, metabolic [G93.41] 10/17/2024 10/21/2024 Yes    TAHIR (acute kidney injury) [N17.9] 05/30/2017 10/21/2024 Yes    Hematemesis [K92.0] 10/16/2024 10/21/2024 Yes    DKA (diabetic ketoacidosis) [E11.10] 05/30/2017 10/21/2024 Yes    Hypernatremia [E87.0] 10/18/2024 10/21/2024 Yes    Acute GI bleeding [K92.2] 10/16/2024 10/17/2024 Yes       Discharged Condition: fair    Disposition[place] [facility]    Follow Up:   Follow-up Information       Lorena Thakur MD Follow up in 1 week(s).    Specialty: Internal Medicine  Why: -f/u in 1 week to make sure BG have been on control with Ciprofloxascin  Contact information:  [address]  [place] [id number] [phone]                           Patient Instructions:      Ambulatory referral/consult to Infectious Disease   Standing Status: Future   Referral Priority: Routine Referral Type: Consultation   Referral Reason: Specialty Services Required   Referred to Provider: CHANO VELARDE Requested Specialty: Infectious Diseases   Number of Visits Requested: 1       Significant Diagnostic Studies: Labs: BMP:   Recent Labs   Lab 10/21/24  0340 10/22/24  0317   * 112*    140   K 3.1* 3.4*    99   CO2 27 31*   BUN 12 13   CREATININE 1.1 1.0   CALCIUM 9.7 9.5   MG 1.8 1.7       Pending Diagnostic Studies:       None           Medications:  Reconciled Home Medications:      Medication List        START taking these medications      blood sugar diagnostic Strp  use to test blood sugar 4 times daily     blood-glucose meter Misc  Use as instructed  Replaces: ONETOUCH ULTRA2 METER kit     ciprofloxacin HCl 750 MG tablet  Commonly known as: CIPRO  Take 1 tablet (750 mg total) by mouth every 12 (twelve) hours.     pantoprazole 40 MG tablet  Commonly known as: PROTONIX  Take 1 tablet (40 mg total) by mouth once daily.            CHANGE how you take these medications      lancets 33 gauge Misc  1  "lancet  by Misc.(Non-Drug; Combo Route) route 4 (four) times daily.  What changed:   how much to take  how to take this  when to take this            CONTINUE taking these medications      baclofen 10 MG tablet  Commonly known as: LIORESAL  Take 1 tablet (10 mg total) by mouth nightly.     insulin aspart U-100 100 unit/mL (3 mL) Inpn pen  Commonly known as: NovoLOG  Inject if sugar is > 180 up to 4x a day , 180-230+2, 231-280+4, 281-330+6, 331-380+8, >380+10. Max daily 40 units     insulin glargine U-300 conc 300 unit/mL (3 mL) insulin pen  Commonly known as: TOUJEO MAX U-300 SOLOSTAR  Inject 26 to 32 units under the skin at night     JARDIANCE 25 mg tablet  Generic drug: empagliflozin  Take 1 tablet (25 mg total) by mouth once daily.     pen needle, diabetic 32 gauge x 5/32" Ndle  Commonly known as: BD SASCHA 2ND GEN PEN NEEDLE  Use 4 times a day            STOP taking these medications      ONETOUCH ULTRA2 METER kit  Generic drug: blood-glucose meter  Replaced by: blood-glucose meter Misc              Indwelling Lines/Drains at time of discharge:   Lines/Drains/Airways       None                   Time spent on the discharge of patient: 35 minutes         Roxi Pack MD  Department of Hospital Medicine  [facility] - Stepdown Flex ([place])  "

## 2024-10-22 NOTE — PLAN OF CARE
Problem: Occupational Therapy  Goal: Occupational Therapy Goal  Description: Goals to be met by: 10/28/24    Patient will increase functional independence with ADLs by performing:    LE Dressing with Stand-by Assistance. MET  Grooming while standing at sink with Set-up Assistance. Ongoing  Toileting from toilet with [place] for hygiene and clothing management. Ongoing  Supine to sit with Modified [place]. Ongoing  Toilet transfer to toilet with Modified [place]. Ongoing    Outcome: Progressing

## 2024-10-22 NOTE — PLAN OF CARE
Problem: Adult Inpatient Plan of Care  Goal: Plan of Care Review  Outcome: Met  Goal: Patient-Specific Goal (Individualized)  Outcome: Met  Goal: Absence of Hospital-Acquired Illness or Injury  Outcome: Met  Goal: Optimal Comfort and Wellbeing  Outcome: Met  Goal: Readiness for Transition of Care  Outcome: Met     Problem: Infection  Goal: Absence of Infection Signs and Symptoms  Outcome: Met     Problem: Diabetes Comorbidity  Goal: Blood Glucose Level Within Targeted Range  Outcome: Met     Problem: Acute Kidney Injury/Impairment  Goal: Fluid and Electrolyte Balance  Outcome: Met  Goal: Improved Oral Intake  Outcome: Met  Goal: Effective Renal Function  Outcome: Met     Problem: Wound  Goal: Optimal Coping  Outcome: Met  Goal: Optimal Functional Ability  Outcome: Met  Goal: Absence of Infection Signs and Symptoms  Outcome: Met  Goal: Improved Oral Intake  Outcome: Met  Goal: Optimal Pain Control and Function  Outcome: Met  Goal: Skin Health and Integrity  Outcome: Met  Goal: Optimal Wound Healing  Outcome: Met     Problem: Sepsis/Septic Shock  Goal: Optimal Coping  Outcome: Met  Goal: Absence of Bleeding  Outcome: Met  Goal: Blood Glucose Level Within Targeted Range  Outcome: Met  Goal: Absence of Infection Signs and Symptoms  Outcome: Met  Goal: Optimal Nutrition Intake  Outcome: Met

## 2024-10-23 ENCOUNTER — PATIENT OUTREACH (OUTPATIENT)
Dept: ADMINISTRATIVE | Facility: CLINIC | Age: 54
End: 2024-10-23
Payer: MEDICARE

## 2024-10-23 LAB
BACTERIA BLD CULT: NORMAL
BACTERIA BLD CULT: NORMAL

## 2024-10-23 NOTE — PLAN OF CARE
[facility] - Stepdown Flex ([place]-14)  Discharge Final Note    Primary Care Provider: Lorena Thakur MD    Expected Discharge Date: 10/22/2024    Patient discharged to home via personal transportation.     Patient's bedside nurse and pt notified of the above.    Discharge Plan A and Plan B have been determined by review of patient's clinical status, future medical and therapeutic needs, and coverage/benefits for post-acute care in coordination with multidisciplinary team members.        Final Discharge Note (most recent)       Final Note - 10/23/24 0938          Final Note    Assessment Type Final Discharge Note     Anticipated Discharge Disposition Home or Self Care     What phone number can be called within the next 1-3 days to see how you are doing after discharge? [phone]     Hospital Resources/Appts/Education Provided Appointments scheduled and added to AVS        Post-Acute Status    Post-Acute Authorization Other     Discharge Delays None known at this time                     Important Message from Medicare  Important Message from Medicare regarding Discharge Appeal Rights: Explained to patient/caregiver, Signed/date by patient/caregiver     Date IMM was signed: 10/21/24  Time IMM was signed: 1331    Contact Info       Lorena Thakur MD   Specialty: Internal Medicine   Relationship: PCP - General    [address]  [facility] [address]   Phone: [phone]       Next Steps: Follow up in 1 week(s)    Instructions: -f/u in 1 week to make sure BG have been on control with Ciprofloxascin            Future Appointments   Date Time Provider Department Center   10/25/2024 11:00 AM Marivel Peterson DPM [facility] WOUND [facility]   11/11/2024  2:15 PM Daniel Reyes DPM [facility] PODIAT [facility]   1/7/2025  2:30 PM Margaux Mercer MD [facility] ENDOCR [place]       W scheduled post-discharge follow-up appointment and information added to AVS.     Raysa Salgado MSW, CSW

## 2024-10-23 NOTE — PROGRESS NOTES
C3 nurse spoke with Indio Ryder Jr.  for a TCC post hospital discharge follow up call. The patient does not have a scheduled HOSFU appointment with Lorena Thakur MD  within 5-7 days post hospital discharge date 10/22/2024. C3 nurse was unable to schedule HOSFU appointment in [facility].    Message sent to PCP staff requesting they contact patient and schedule follow up appointment.

## 2024-10-25 ENCOUNTER — LAB VISIT (OUTPATIENT)
Dept: LAB | Facility: HOSPITAL | Age: 54
End: 2024-10-25
Attending: INTERNAL MEDICINE
Payer: MEDICARE

## 2024-10-25 ENCOUNTER — HOSPITAL ENCOUNTER (OUTPATIENT)
Dept: WOUND CARE | Facility: HOSPITAL | Age: 54
Discharge: HOME | End: 2024-10-25
Attending: PODIATRIST
Payer: MEDICARE

## 2024-10-25 VITALS
TEMPERATURE: 98 F | DIASTOLIC BLOOD PRESSURE: 73 MMHG | RESPIRATION RATE: 16 BRPM | HEART RATE: 102 BPM | SYSTOLIC BLOOD PRESSURE: 139 MMHG

## 2024-10-25 DIAGNOSIS — M86.10 ACUTE ON CHRONIC OSTEOMYELITIS: Primary | ICD-10-CM

## 2024-10-25 DIAGNOSIS — M86.60 ACUTE ON CHRONIC OSTEOMYELITIS: Primary | ICD-10-CM

## 2024-10-25 DIAGNOSIS — L97.426 DIABETIC ULCER OF LEFT MIDFOOT ASSOCIATED WITH TYPE 2 DIABETES MELLITUS, WITH BONE INVOLVEMENT WITHOUT EVIDENCE OF NECROSIS: ICD-10-CM

## 2024-10-25 DIAGNOSIS — M86.60 ACUTE ON CHRONIC OSTEOMYELITIS: ICD-10-CM

## 2024-10-25 DIAGNOSIS — E11.621 DIABETIC ULCER OF LEFT MIDFOOT ASSOCIATED WITH TYPE 2 DIABETES MELLITUS, WITH BONE INVOLVEMENT WITHOUT EVIDENCE OF NECROSIS: ICD-10-CM

## 2024-10-25 DIAGNOSIS — M86.10 ACUTE ON CHRONIC OSTEOMYELITIS: ICD-10-CM

## 2024-10-25 LAB
ALBUMIN SERPL BCP-MCNC: 2.1 G/DL (ref 3.5–5.2)
ALP SERPL-CCNC: 156 U/L (ref 40–150)
ALT SERPL W/O P-5'-P-CCNC: 17 U/L (ref 10–44)
ANION GAP SERPL CALC-SCNC: 9 MMOL/L (ref 8–16)
AST SERPL-CCNC: 20 U/L (ref 10–40)
BASOPHILS # BLD AUTO: 0.02 K/UL (ref 0–0.2)
BASOPHILS NFR BLD: 0.3 % (ref 0–1.9)
BILIRUB SERPL-MCNC: 0.2 MG/DL (ref 0.1–1)
BUN SERPL-MCNC: 15 MG/DL (ref 6–20)
CALCIUM SERPL-MCNC: 9.4 MG/DL (ref 8.7–10.5)
CHLORIDE SERPL-SCNC: 97 MMOL/L (ref 95–110)
CK SERPL-CCNC: 79 U/L (ref 20–200)
CO2 SERPL-SCNC: 34 MMOL/L (ref 23–29)
CREAT SERPL-MCNC: 1.1 MG/DL (ref 0.5–1.4)
CRP SERPL-MCNC: 52.2 MG/L (ref 0–8.2)
DIFFERENTIAL METHOD BLD: ABNORMAL
EOSINOPHIL # BLD AUTO: 0.1 K/UL (ref 0–0.5)
EOSINOPHIL NFR BLD: 1.1 % (ref 0–8)
ERYTHROCYTE [DISTWIDTH] IN BLOOD BY AUTOMATED COUNT: 14 % (ref 11.5–14.5)
EST. GFR  (NO RACE VARIABLE): >60 ML/MIN/1.73 M^2
GLUCOSE SERPL-MCNC: 128 MG/DL (ref 70–110)
HCT VFR BLD AUTO: 33.9 % (ref 40–54)
HGB BLD-MCNC: 10 G/DL (ref 14–18)
IMM GRANULOCYTES # BLD AUTO: 0.04 K/UL (ref 0–0.04)
IMM GRANULOCYTES NFR BLD AUTO: 0.6 % (ref 0–0.5)
LYMPHOCYTES # BLD AUTO: 1.8 K/UL (ref 1–4.8)
LYMPHOCYTES NFR BLD: 26.4 % (ref 18–48)
MCH RBC QN AUTO: 27.9 PG (ref 27–31)
MCHC RBC AUTO-ENTMCNC: 29.5 G/DL (ref 32–36)
MCV RBC AUTO: 94 FL (ref 82–98)
MONOCYTES # BLD AUTO: 1 K/UL (ref 0.3–1)
MONOCYTES NFR BLD: 13.8 % (ref 4–15)
NEUTROPHILS # BLD AUTO: 4 K/UL (ref 1.8–7.7)
NEUTROPHILS NFR BLD: 57.8 % (ref 38–73)
NRBC BLD-RTO: 0 /100 WBC
PLATELET # BLD AUTO: 307 K/UL (ref 150–450)
PMV BLD AUTO: 8.8 FL (ref 9.2–12.9)
POTASSIUM SERPL-SCNC: 3.7 MMOL/L (ref 3.5–5.1)
PROT SERPL-MCNC: 7.1 G/DL (ref 6–8.4)
RBC # BLD AUTO: 3.59 M/UL (ref 4.6–6.2)
SODIUM SERPL-SCNC: 140 MMOL/L (ref 136–145)
WBC # BLD AUTO: 6.96 K/UL (ref 3.9–12.7)

## 2024-10-25 PROCEDURE — 86140 C-REACTIVE PROTEIN: CPT | Mod: HCNC | Performed by: INTERNAL MEDICINE

## 2024-10-25 PROCEDURE — 11042 DBRDMT SUBQ TIS 1ST 20SQCM/<: CPT | Mod: HCNC | Performed by: PODIATRIST

## 2024-10-25 PROCEDURE — 36415 COLL VENOUS BLD VENIPUNCTURE: CPT | Mod: HCNC | Performed by: INTERNAL MEDICINE

## 2024-10-25 PROCEDURE — 99499 UNLISTED E&M SERVICE: CPT | Mod: HCNC,,, | Performed by: PODIATRIST

## 2024-10-25 PROCEDURE — 11044 DBRDMT BONE 1ST 20 SQ CM/<: CPT | Mod: HCNC,,, | Performed by: PODIATRIST

## 2024-10-25 PROCEDURE — 80053 COMPREHEN METABOLIC PANEL: CPT | Mod: HCNC | Performed by: INTERNAL MEDICINE

## 2024-10-25 PROCEDURE — 85025 COMPLETE CBC W/AUTO DIFF WBC: CPT | Mod: HCNC | Performed by: INTERNAL MEDICINE

## 2024-10-25 PROCEDURE — 82550 ASSAY OF CK (CPK): CPT | Mod: HCNC | Performed by: INTERNAL MEDICINE

## 2024-10-25 NOTE — PROGRESS NOTES
[facility]                Progress Note    Subjective:       Patient ID: Indio Ryder Jr. is a 54 y.o. male.    Chief Complaint: Wound Care, Diabetic Foot Ulcer, and Dressing Change    Hospital Follow Up. Patient ambulated with assistance of knee scooter for LLE to [facility]. Prescribed CAM Boot on left Foot. Patient denies fever, chills, nausea, vomiting or diarrhea at present. Patient denies pain or discomfort to wound bed at present. Patient reports being concerned about dressing from hospital. Removed betadine soaked gauze, cast padding, Ace wrap to foot with heel and toes exposed. Dressing appears clean, intact with strikethrough drainage to Ace Wrap outer layer. Dressing removed & wounds cleaned by nurse. Dressing discarded. Left Plantar Foot wound have skin layer between, will document as Distal and Proximal. Patient reports taking oral Cipro as ordered without complaint.     Changes made to dressing order; applied per DPM order. Patient will return to [facility] on Monday and Wednesday for nurse visits and then [facility] to see DPM in 1 week. Patient declined AVS, has MyChart.           Review of Systems      Objective:        Physical Exam    Vitals:    10/25/24 0930   BP: 139/73   Pulse: 102   Resp: 16   Temp: 97.7 °F (36.5 °C)       Assessment:           ICD-10-CM ICD-9-CM   1. Acute on chronic osteomyelitis  M86.10 730.00    M86.60 730.10   2. Diabetic ulcer of left midfoot associated with type 2 diabetes mellitus, with bone involvement without evidence of necrosis  E11.621 250.80    L97.426 707.14            (Retired) Wound 04/08/22 1137 Diabetic Ulcer Left distal;plantar Foot (Active)   04/08/22 1137    Pre-existing: Yes   Primary Wound Type: Diabetic ulc   Side: Left   Orientation: distal;plantar   Location: Foot   Wound Number:    Ankle-Brachial Index:    Pulses:    Removal Indication and Assessment: not present upon hospital arrival   Wound Outcome:    (Retired)  Wound Type:    (Retired) Wound Length (cm):    (Retired) Wound Width (cm):    (Retired) Depth (cm):    Wound Description (Comments):    Removal Indications:    Wound Image    10/25/24 0944   Dressing Appearance Intact;Moist drainage;Area marked 10/25/24 0944   Drainage Amount Large 10/25/24 0944   Drainage Characteristics/Odor Serosanguineous 10/25/24 0944   Appearance Pink;Moist 10/25/24 0944   Tissue loss description Partial thickness 10/25/24 0944   Black (%), Wound Tissue Color 0 % 10/25/24 0944   Red (%), Wound Tissue Color 100 % 10/25/24 0944   Yellow (%), Wound Tissue Color 0 % 10/25/24 0944   Periwound Area Pale white;Moist;Macerated 10/25/24 0944   Wound Edges Callused;Defined;Open 10/25/24 0944   Wound Length (cm) 0.9 cm 10/25/24 0944   Wound Width (cm) 0.2 cm 10/25/24 0944   Wound Depth (cm) 0.1 cm 10/25/24 0944   Wound Volume (cm^3) 0.018 cm^3 10/25/24 0944   Wound Surface Area (cm^2) 0.18 cm^2 10/25/24 0944   Tunneling (depth (cm)/location) 0 10/25/24 0944   Undermining (depth (cm)/location) 0 10/25/24 0944   Care Cleansed with:;Antimicrobial agent;Sterile normal saline;Debrided;Wound cleanser 10/25/24 0944   Dressing Changed;Collagen;Calcium alginate;Absorptive Pad;Foam;Cast padding;Tubular bandage 10/25/24 0944   Periwound Care Moisture barrier applied;Absorptive dressing applied 10/25/24 0944   Compression Tubular elasticized bandage 10/25/24 0944   Off Loading Football dressing;Off loading shoe 10/25/24 0944   Dressing Change Due 10/28/24 10/25/24 0944            Wound 10/16/24 1211 Diabetic Ulcer Left proximal;plantar Foot (Active)   10/16/24 1211 Foot   Present on Original Admission: Y   Primary Wound Type: Diabetic ulc   Side: Left   Orientation: proximal;plantar   Wound Approximate Age at First Assessment (Weeks):    Wound Number:    Is this injury device related?: No   Incision Type:    Closure Method:    Wound Description (Comments):    Type:    Additional Comments:    Ankle-Brachial Index:     Pulses:    Removal Indication and Assessment:    Wound Outcome:    Wound Image    10/25/24 0944   Dressing Appearance Intact;Moist drainage;Area marked 10/25/24 0944   Drainage Amount Large 10/25/24 0944   Drainage Characteristics/Odor Serosanguineous;No odor 10/25/24 0944   Appearance Pink;Red;Moist;Bone 10/25/24 0944   Tissue loss description Full thickness 10/25/24 0944   Black (%), Wound Tissue Color 0 % 10/25/24 0944   Red (%), Wound Tissue Color 100 % 10/25/24 0944   Yellow (%), Wound Tissue Color 0 % 10/25/24 0944   Periwound Area Pale white;Moist;Macerated 10/25/24 0944   Wound Edges Defined;Open 10/25/24 0944   Wound Length (cm) 3.8 cm 10/25/24 0944   Wound Width (cm) 2.5 cm 10/25/24 0944   Wound Depth (cm) 1.2 cm 10/25/24 0944   Wound Volume (cm^3) 11.4 cm^3 10/25/24 0944   Wound Surface Area (cm^2) 9.5 cm^2 10/25/24 0944   Tunneling (depth (cm)/location) 1.2 cm @ 10-11 o'clock 10/25/24 0944   Undermining (depth (cm)/location) 0 10/25/24 0944   Care Cleansed with:;Antimicrobial agent;Sterile normal saline;Debrided;Wound cleanser 10/25/24 0944   Dressing Changed;Calcium alginate;Absorptive Pad;Foam;Cast padding;Tubular bandage 10/25/24 0944   Periwound Care Moisture barrier applied;Absorptive dressing applied 10/25/24 0944   Compression Tubular elasticized bandage 10/25/24 0944   Off Loading Football dressing;Off loading shoe 10/25/24 0944   Dressing Change Due 10/28/24 10/25/24 0944           Plan:              Tissue pathology and/or culture taken     [] Yes      [x] No  Sharp debridement performed                   [x] Yes       [] No  Labs ordered     [x] Yes       [] No  Imaging ordered    [] Yes      [x] No    Orders Placed This Encounter   Procedures    Change dressing     Wound Dressing Orders    Dressing change frequency three times a week and prn Nurse Visits  Remove old dressing  Cleanse or irrigate with: Normal Saline then Vinegar 5% soak for 10 minutes  Protect periwound with:  Gentian Violet  "    Left Distal Foot Wound  Primary dressing: WOUND BASE: [place] to wound bed  Secondary dressing: [place]    Left Proximal Foot Wound  Primary dressing: WOUND BASE: [place] to wound bed, packed into deficit  Secondary dressing: Custom felt pad (to entire plantar aspect of foot, with 2 holes cut to allow for drainage - okay to not apply at nurse visit if large amount of maceration noted to periwound) then Drawtex: cut to fit holes of custom felt pad, then Mextra size 5" x 9"  Compression/Off-load: Tubigrip, single layer, size D (okay to change to double layer if patient tolerates or swelling noted at nurse visit). Open Toe Football (cast padding x 2 rolls) and CAM boot on the affected foot    Change at Nurse Visit.        Follow up in about 3 days (around 10/28/2024) for Nurse Visit.         " patient how uncontrolled diabetes can affect the neurovascular status of his  foot as well as his ability to heal wounds and fight infection.     Patient will return to [facility] on Monday and Wednesday for nurse visits and then [facility] to see DPM in 1 week. Patient declined AVS, has MyChart.    Wound Debridement    Date/Time: 10/25/2024 10:00 AM    Performed by: Marivel Peterson DPM  Authorized by: Marivel Peterson DPM      Wound Details:    Location:  Left foot    Location:  Left Plantar    Type of Debridement:  Excisional       Length (cm):  3.8       Area (sq cm):  9.5       Width (cm):  2.5       Percent Debrided (%):  100       Depth (cm):  1.2       Total Area Debrided (sq cm):  9.5    Depth of debridement:  Bone    Tissue debrided:  Bone, Epidermis, Subcutaneous and Dermis    Devitalized tissue debrided:  Biofilm, Callus and Fibrin    Instruments:  Curette, Rongeur, Scissors and Forceps  Bleeding:  Moderate  Hemostasis Achieved: Yes  Method Used:  Pressure  Patient tolerance:  Patient tolerated the procedure well with no immediate complications      Tissue pathology and/or culture taken     [x] Yes      [] No  Sharp debridement performed                   [x] Yes       [] No  Labs ordered     [] Yes       [x] No  Imaging ordered    [] Yes      [x] No    Orders Placed This Encounter   Procedures    Debridement     This order was created via procedure documentation     Standing Status:   Standing     Number of Occurrences:   1    Change dressing     Wound Dressing Orders    Dressing change frequency three times a week and prn Nurse Visits  Remove old dressing  Cleanse or irrigate with: Normal Saline then Vinegar 5% soak for 10 minutes  Protect periwound with:  Gentian Violet     Left Distal Foot Wound  Primary dressing: WOUND BASE: [place] to wound bed  Secondary dressing: Silvercel    Left Proximal Foot Wound  Primary dressing: WOUND BASE: [place] to wound bed, packed into deficit  Secondary dressing: Custom felt pad  "(to entire plantar aspect of foot, with 2 holes cut to allow for drainage - okay to not apply at nurse visit if large amount of maceration noted to periwound) then Drawtex: cut to fit holes of custom felt pad, then Mextra size 5" x 9"  Compression/Off-load: Tubigrip, single layer, size D (okay to change to double layer if patient tolerates or swelling noted at nurse visit). Open Toe Football (cast padding x 2 rolls) and CAM boot on the affected foot    Change at Nurse Visit.        Follow up in about 3 days (around 10/28/2024) for Nurse Visit.       "

## 2024-10-28 ENCOUNTER — HOSPITAL ENCOUNTER (OUTPATIENT)
Dept: WOUND CARE | Facility: HOSPITAL | Age: 54
Discharge: HOME | End: 2024-10-28
Payer: MEDICARE

## 2024-10-28 ENCOUNTER — LAB VISIT (OUTPATIENT)
Dept: LAB | Facility: HOSPITAL | Age: 54
End: 2024-10-28
Attending: INTERNAL MEDICINE
Payer: MEDICARE

## 2024-10-28 VITALS — SYSTOLIC BLOOD PRESSURE: 136 MMHG | TEMPERATURE: 98 F | HEART RATE: 80 BPM | DIASTOLIC BLOOD PRESSURE: 80 MMHG

## 2024-10-28 DIAGNOSIS — M86.60 ACUTE ON CHRONIC OSTEOMYELITIS: ICD-10-CM

## 2024-10-28 DIAGNOSIS — M86.60 ACUTE ON CHRONIC OSTEOMYELITIS: Primary | ICD-10-CM

## 2024-10-28 DIAGNOSIS — M86.10 ACUTE ON CHRONIC OSTEOMYELITIS: ICD-10-CM

## 2024-10-28 DIAGNOSIS — M86.10 ACUTE ON CHRONIC OSTEOMYELITIS: Primary | ICD-10-CM

## 2024-10-28 DIAGNOSIS — E11.621 DIABETIC ULCER OF LEFT MIDFOOT ASSOCIATED WITH TYPE 2 DIABETES MELLITUS, WITH BONE INVOLVEMENT WITHOUT EVIDENCE OF NECROSIS: ICD-10-CM

## 2024-10-28 DIAGNOSIS — L97.426 DIABETIC ULCER OF LEFT MIDFOOT ASSOCIATED WITH TYPE 2 DIABETES MELLITUS, WITH BONE INVOLVEMENT WITHOUT EVIDENCE OF NECROSIS: ICD-10-CM

## 2024-10-28 LAB
ALBUMIN SERPL BCP-MCNC: 2.1 G/DL (ref 3.5–5.2)
ALP SERPL-CCNC: 188 U/L (ref 40–150)
ALT SERPL W/O P-5'-P-CCNC: 15 U/L (ref 10–44)
ANION GAP SERPL CALC-SCNC: 7 MMOL/L (ref 8–16)
AST SERPL-CCNC: 16 U/L (ref 10–40)
BASOPHILS # BLD AUTO: 0.03 K/UL (ref 0–0.2)
BASOPHILS NFR BLD: 0.3 % (ref 0–1.9)
BILIRUB SERPL-MCNC: 0.3 MG/DL (ref 0.1–1)
BUN SERPL-MCNC: 12 MG/DL (ref 6–20)
CALCIUM SERPL-MCNC: 9.4 MG/DL (ref 8.7–10.5)
CHLORIDE SERPL-SCNC: 99 MMOL/L (ref 95–110)
CK SERPL-CCNC: 53 U/L (ref 20–200)
CO2 SERPL-SCNC: 31 MMOL/L (ref 23–29)
CREAT SERPL-MCNC: 1.3 MG/DL (ref 0.5–1.4)
CRP SERPL-MCNC: 134.1 MG/L (ref 0–8.2)
DIFFERENTIAL METHOD BLD: ABNORMAL
EOSINOPHIL # BLD AUTO: 0 K/UL (ref 0–0.5)
EOSINOPHIL NFR BLD: 0.3 % (ref 0–8)
ERYTHROCYTE [DISTWIDTH] IN BLOOD BY AUTOMATED COUNT: 14.5 % (ref 11.5–14.5)
EST. GFR  (NO RACE VARIABLE): >60 ML/MIN/1.73 M^2
GLUCOSE SERPL-MCNC: 170 MG/DL (ref 70–110)
HCT VFR BLD AUTO: 32.9 % (ref 40–54)
HGB BLD-MCNC: 10.1 G/DL (ref 14–18)
IMM GRANULOCYTES # BLD AUTO: 0.06 K/UL (ref 0–0.04)
IMM GRANULOCYTES NFR BLD AUTO: 0.5 % (ref 0–0.5)
LYMPHOCYTES # BLD AUTO: 2 K/UL (ref 1–4.8)
LYMPHOCYTES NFR BLD: 17.5 % (ref 18–48)
MCH RBC QN AUTO: 28.7 PG (ref 27–31)
MCHC RBC AUTO-ENTMCNC: 30.7 G/DL (ref 32–36)
MCV RBC AUTO: 94 FL (ref 82–98)
MONOCYTES # BLD AUTO: 1.2 K/UL (ref 0.3–1)
MONOCYTES NFR BLD: 10.6 % (ref 4–15)
NEUTROPHILS # BLD AUTO: 8.2 K/UL (ref 1.8–7.7)
NEUTROPHILS NFR BLD: 70.8 % (ref 38–73)
NRBC BLD-RTO: 0 /100 WBC
PLATELET # BLD AUTO: 322 K/UL (ref 150–450)
PMV BLD AUTO: 8.6 FL (ref 9.2–12.9)
POTASSIUM SERPL-SCNC: 4.4 MMOL/L (ref 3.5–5.1)
PROT SERPL-MCNC: 7.4 G/DL (ref 6–8.4)
RBC # BLD AUTO: 3.52 M/UL (ref 4.6–6.2)
SODIUM SERPL-SCNC: 137 MMOL/L (ref 136–145)
WBC # BLD AUTO: 11.53 K/UL (ref 3.9–12.7)

## 2024-10-28 PROCEDURE — 86140 C-REACTIVE PROTEIN: CPT | Mod: HCNC | Performed by: INTERNAL MEDICINE

## 2024-10-28 PROCEDURE — 85025 COMPLETE CBC W/AUTO DIFF WBC: CPT | Mod: HCNC | Performed by: INTERNAL MEDICINE

## 2024-10-28 PROCEDURE — 82550 ASSAY OF CK (CPK): CPT | Mod: HCNC | Performed by: INTERNAL MEDICINE

## 2024-10-28 PROCEDURE — 99213 OFFICE O/P EST LOW 20 MIN: CPT | Mod: HCNC

## 2024-10-28 PROCEDURE — 36415 COLL VENOUS BLD VENIPUNCTURE: CPT | Mod: HCNC | Performed by: INTERNAL MEDICINE

## 2024-10-28 PROCEDURE — 80053 COMPREHEN METABOLIC PANEL: CPT | Mod: HCNC | Performed by: INTERNAL MEDICINE

## 2024-10-29 ENCOUNTER — TELEPHONE (OUTPATIENT)
Dept: WOUND CARE | Facility: HOSPITAL | Age: 54
End: 2024-10-29
Payer: MEDICARE

## 2024-10-30 ENCOUNTER — HOSPITAL ENCOUNTER (OUTPATIENT)
Dept: WOUND CARE | Facility: HOSPITAL | Age: 54
Discharge: HOME | End: 2024-10-30
Attending: PODIATRIST
Payer: MEDICARE

## 2024-10-30 VITALS
RESPIRATION RATE: 16 BRPM | SYSTOLIC BLOOD PRESSURE: 115 MMHG | DIASTOLIC BLOOD PRESSURE: 68 MMHG | TEMPERATURE: 97 F | HEART RATE: 98 BPM

## 2024-10-30 DIAGNOSIS — M86.10 ACUTE ON CHRONIC OSTEOMYELITIS: Primary | ICD-10-CM

## 2024-10-30 DIAGNOSIS — E11.621 DIABETIC ULCER OF LEFT MIDFOOT ASSOCIATED WITH TYPE 2 DIABETES MELLITUS, WITH BONE INVOLVEMENT WITHOUT EVIDENCE OF NECROSIS: ICD-10-CM

## 2024-10-30 DIAGNOSIS — M86.60 ACUTE ON CHRONIC OSTEOMYELITIS: Primary | ICD-10-CM

## 2024-10-30 DIAGNOSIS — L97.426 DIABETIC ULCER OF LEFT MIDFOOT ASSOCIATED WITH TYPE 2 DIABETES MELLITUS, WITH BONE INVOLVEMENT WITHOUT EVIDENCE OF NECROSIS: ICD-10-CM

## 2024-10-30 PROCEDURE — 99213 OFFICE O/P EST LOW 20 MIN: CPT | Mod: HCNC

## 2024-11-01 ENCOUNTER — HOSPITAL ENCOUNTER (OUTPATIENT)
Dept: WOUND CARE | Facility: HOSPITAL | Age: 54
Discharge: HOME | End: 2024-11-01
Attending: PODIATRIST
Payer: MEDICARE

## 2024-11-01 VITALS
SYSTOLIC BLOOD PRESSURE: 104 MMHG | RESPIRATION RATE: 14 BRPM | DIASTOLIC BLOOD PRESSURE: 65 MMHG | HEART RATE: 99 BPM | TEMPERATURE: 97 F

## 2024-11-01 DIAGNOSIS — L97.426 DIABETIC ULCER OF LEFT MIDFOOT ASSOCIATED WITH TYPE 2 DIABETES MELLITUS, WITH BONE INVOLVEMENT WITHOUT EVIDENCE OF NECROSIS: ICD-10-CM

## 2024-11-01 DIAGNOSIS — E11.621 DIABETIC ULCER OF LEFT MIDFOOT ASSOCIATED WITH TYPE 2 DIABETES MELLITUS, WITH BONE INVOLVEMENT WITHOUT EVIDENCE OF NECROSIS: ICD-10-CM

## 2024-11-01 DIAGNOSIS — M86.60 ACUTE ON CHRONIC OSTEOMYELITIS: Primary | ICD-10-CM

## 2024-11-01 DIAGNOSIS — M86.10 ACUTE ON CHRONIC OSTEOMYELITIS: Primary | ICD-10-CM

## 2024-11-01 PROCEDURE — 99499 UNLISTED E&M SERVICE: CPT | Mod: HCNC,,, | Performed by: PODIATRIST

## 2024-11-01 PROCEDURE — 11043 DBRDMT MUSC&/FSCA 1ST 20/<: CPT | Mod: HCNC,,, | Performed by: PODIATRIST

## 2024-11-01 PROCEDURE — 11042 DBRDMT SUBQ TIS 1ST 20SQCM/<: CPT | Mod: HCNC | Performed by: PODIATRIST

## 2024-11-01 NOTE — PROGRESS NOTES
[facility]                Progress Note    Subjective:       Patient ID: Indio Ryder Jr. is a 54 y.o. male.    Chief Complaint: Wound Care, Diabetic Foot Ulcer, and Dressing Change    Follow Up wound care visit. Patient ambulated with assistance of knee scooter for LLE to [facility]. Prescribed CAM Boot on left Foot. Patient denies fever, chills, nausea, vomiting or diarrhea at present. Patient denies pain or discomfort to wound beds at present. Patient reports not having any issues with Tubigrip since last visit. Tubigrip is well placed and clean. Dressing appears intact with a dime sized amount of strikethrough drainage near Proximal wound bed. Dressing removed & wounds cleaned by nurse. Dressing discarded. Patient reports taking Cipro 750 mg orally BID, without complaint.     Changes made to dressing order; applied per DPM order. Patient will return to [facility] on Monday and Wednesday for nurse visits then in 1 week to see DPM. Patient declined AVS, has MyChart.         Review of Systems      Objective:        Physical Exam    Vitals:    11/01/24 0950   BP: 104/65   Pulse: 99   Resp: 14   Temp: 97.4 °F (36.3 °C)       Assessment:           ICD-10-CM ICD-9-CM   1. Acute on chronic osteomyelitis  M86.10 730.00    M86.60 730.10   2. Diabetic ulcer of left midfoot associated with type 2 diabetes mellitus, with bone involvement without evidence of necrosis  E11.621 250.80    L97.426 707.14            (Retired) Wound 04/08/22 1137 Diabetic Ulcer Left distal;plantar Foot (Active)   04/08/22 1137    Pre-existing: Yes   Primary Wound Type: Diabetic ulc   Side: Left   Orientation: distal;plantar   Location: Foot   Wound Number:    Ankle-Brachial Index:    Pulses:    Removal Indication and Assessment: not present upon hospital arrival   Wound Outcome:    (Retired) Wound Type:    (Retired) Wound Length (cm):    (Retired) Wound Width (cm):    (Retired) Depth (cm):    Wound Description  "(Comments):    Removal Indications:    Wound Image    11/01/24 1000            Wound 10/16/24 1211 Diabetic Ulcer Left proximal;plantar Foot (Active)   10/16/24 1211 Foot   Present on Original Admission: Y   Primary Wound Type: Diabetic ulc   Side: Left   Orientation: proximal;plantar   Wound Approximate Age at First Assessment (Weeks):    Wound Number:    Is this injury device related?: No   Incision Type:    Closure Method:    Wound Description (Comments):    Type:    Additional Comments:    Ankle-Brachial Index:    Pulses:    Removal Indication and Assessment:    Wound Outcome:    Wound Image    11/01/24 1000           Plan:              Tissue pathology and/or culture taken     [] Yes      [x] No  Sharp debridement performed                   [x] Yes       [] No  Labs ordered     [] Yes       [x] No  Imaging ordered    [] Yes      [x] No    Orders Placed This Encounter   Procedures    Change dressing     Wound Dressing Orders    Dressing change frequency three times a week and prn Nurse Visits  Remove old dressing  Cleanse or irrigate with: Normal Saline then Vinegar 5% soak for 10 minutes  Protect periwound with:  Gentian Violet    Left Distal Foot Wound  Primary dressing: WOUND BASE: Endoform then Iodosorb to wound bed  Secondary dressing: Kaltostat    Left Proximal Foot Wound  Primary dressing: WOUND BASE: Retention Sutures applied by DPDENIS Endoform to open areas of wound bed beneath sutures then Iodosorb   Secondary dressing: Mextra size 5" x 9" (laid vertically) then Abram Foam long x 3 to cushion  Compression/Off-load: Tubigrip, single layer, size D (okay to change to double layer if patient tolerates or swelling noted at nurse visit). Open Toe Football (cast padding x 2 rolls) and CAM boot on the affected foot    Change at Nurse Visit.        Follow up in about 3 days (around 11/4/2024) for Nurse Visit.       " 1211 Diabetic Ulcer Left proximal;plantar Foot (Active)   10/16/24 1211 Foot   Present on Original Admission: Y   Primary Wound Type: Diabetic ulc   Side: Left   Orientation: proximal;plantar   Wound Approximate Age at First Assessment (Weeks):    Wound Number:    Is this injury device related?: No   Incision Type:    Closure Method:    Wound Description (Comments):    Type:    Additional Comments:    Ankle-Brachial Index:    Pulses:    Removal Indication and Assessment:    Wound Outcome:    Wound Image    11/01/24 1000     Education about the prevention of limb loss.    Discussed wound healing cycle, skin integrity, ways to care for skin.Counseled patient on the effects of biomechanical pressure,  PVD, and blood glucose on healing. He verbalizes understanding that it can increase the chances of delayed healing and this prolonged exposure leads to infection or progression of infection which subsequently can result in loss of limb.    Dressing removed & wounds cleaned by nurse. Dressing discarded. Patient reports taking Cipro 750 mg orally BID, without complaint.      Discussed with patient how uncontrolled diabetes can affect the neurovascular status of his  foot as well as his ability to heal wounds and fight infection.     Patient will return to [facility] on Monday and Wednesday for nurse visits and then [facility] to see DPM in 1 week. Patient declined AVS, has MyChart.    Wound Debridement    Date/Time: 11/1/2024 11:00 AM    Performed by: Marivel Peterson DPM  Authorized by: Marivel Peterson DPM      Wound Details:    Location:  Left foot    Location:  Left Plantar    Type of Debridement:  Excisional       Length (cm):  0.9       Area (sq cm):  0.18       Width (cm):  0.2       Percent Debrided (%):  100       Depth (cm):  0.2       Total Area Debrided (sq cm):  0.18    Depth of debridement:  Muscle/fascia/tendon    Tissue debrided:  Dermis, Epidermis, Subcutaneous and Muscle    Devitalized tissue debrided:  Callus and  "Fibrin    Instruments:  Curette  Bleeding:  Minimal  Hemostasis Achieved: Yes  Method Used:  Pressure (retention sutures)  Patient tolerance:  Patient tolerated the procedure well with no immediate complications      Tissue pathology and/or culture taken     [] Yes      [x] No  Sharp debridement performed                   [x] Yes       [] No  Labs ordered     [] Yes       [x] No  Imaging ordered    [] Yes      [x] No    Orders Placed This Encounter   Procedures    Debridement     This order was created via procedure documentation     Standing Status:   Standing     Number of Occurrences:   1    Change dressing     Wound Dressing Orders    Dressing change frequency three times a week and prn Nurse Visits  Remove old dressing  Cleanse or irrigate with: Normal Saline then Vinegar 5% soak for 10 minutes  Protect periwound with:  Gentian Violet    Left Distal Foot Wound  Primary dressing: WOUND BASE: Endoform then Iodosorb to wound bed  Secondary dressing: Kaltostat    Left Proximal Foot Wound  Primary dressing: WOUND BASE: Retention Sutures applied by DPM, Endoform to open areas of wound bed beneath sutures then Iodosorb   Secondary dressing: Mextra size 5" x 9" (laid vertically) then Abram Foam long x 3 to cushion  Compression/Off-load: Tubigrip, single layer, size D (okay to change to double layer if patient tolerates or swelling noted at nurse visit). Open Toe Football (cast padding x 2 rolls) and CAM boot on the affected foot    Change at Nurse Visit.        Follow up in about 3 days (around 11/4/2024) for Nurse Visit.         "

## 2024-11-04 ENCOUNTER — PATIENT MESSAGE (OUTPATIENT)
Dept: INFECTIOUS DISEASES | Facility: CLINIC | Age: 54
End: 2024-11-04
Payer: MEDICARE

## 2024-11-04 NOTE — PROGRESS NOTES
[facility] [address]  [address]  Nurse Visit    Subjective:       Patient seen in clinic today. Patient ambulated with assistance of knee scooter for LLE to [facility]. Prescribed CAM Boot on Left Foot. Patient denies fever, chills, nausea, vomiting or diarrhea at present. Patient denies pain or discomfort to wound beds at present. Patient reports taking oral Cipro 750 mg PO BID as directed without complaint. He reports trying to reach ID physician, but has not been successful, he will send a message to Dr. Peterson to discuss. He was unable to keep the Monday appointment d/t his sister having an appointment of her own. Dressing appears intact without strikethrough drainage. Dressing removed & wound cleaned by nurse.  Dressing discarded. Left Plantar Foot wound (Proximal) still has sutures intact, no broken skin noted around area of retention. Left Plantar Foot wound (Distal) appears stable. Patient declined AVS, has MyChart.  Dressing changed as ordered. Patient scheduled to return on Friday to see Podiatrist.    Vitals:    11/05/24 0945   BP: 107/62   Pulse: 97   Resp: 16   Temp: 97.1 °F (36.2 °C)         Assessment:          (Retired) Wound 04/08/22 1137 Diabetic Ulcer Left distal;plantar Foot (Active)   04/08/22 1137    Pre-existing: Yes   Primary Wound Type: Diabetic ulc   Side: Left   Orientation: distal;plantar   Location: Foot   Wound Number:    Ankle-Brachial Index:    Pulses:    Removal Indication and Assessment: not present upon hospital arrival   Wound Outcome:    (Retired) Wound Type:    (Retired) Wound Length (cm):    (Retired) Wound Width (cm):    (Retired) Depth (cm):    Wound Description (Comments):    Removal Indications:    Wound Image   11/05/24 0950   Wound WDL ex 11/05/24 0950   Dressing Appearance Intact;Moist drainage 11/05/24 0950   Drainage Amount Small 11/05/24 0950   Drainage Characteristics/Odor Serosanguineous;No odor 11/05/24 0950   Appearance  Pink;Moist 11/05/24 0950   Tissue loss description Full thickness 11/05/24 0950   Black (%), Wound Tissue Color 0 % 11/05/24 0950   Red (%), Wound Tissue Color 100 % 11/05/24 0950   Yellow (%), Wound Tissue Color 0 % 11/05/24 0950   Periwound Area Pale white 11/05/24 0950   Wound Edges Defined;Open 11/05/24 0950   Wound Length (cm) 0.3 cm 11/05/24 0950   Wound Width (cm) 0.7 cm 11/05/24 0950   Wound Depth (cm) 0.2 cm 11/05/24 0950   Wound Volume (cm^3) 0.042 cm^3 11/05/24 0950   Wound Surface Area (cm^2) 0.21 cm^2 11/05/24 0950   Tunneling (depth (cm)/location) 0 11/05/24 0950   Undermining (depth (cm)/location) 0 11/05/24 0950   Care Cleansed with:;Antimicrobial agent;Sterile normal saline 11/05/24 0950   Dressing Changed;Collagen;Absorptive Pad;Foam;Cast padding;Tubular bandage 11/05/24 0950   Periwound Care Moisture barrier applied;Absorptive dressing applied 11/05/24 0950   Compression Tubular elasticized bandage 11/05/24 0950   Off Loading Football dressing;Off loading shoe 11/05/24 0950   Dressing Change Due 11/08/24 11/05/24 0950            Wound 10/16/24 1211 Diabetic Ulcer Left proximal;plantar Foot (Active)   10/16/24 1211 Foot   Present on Original Admission: Y   Primary Wound Type: Diabetic ulc   Side: Left   Orientation: proximal;plantar   Wound Approximate Age at First Assessment (Weeks):    Wound Number:    Is this injury device related?: No   Incision Type:    Closure Method:    Wound Description (Comments):    Type:    Additional Comments:    Ankle-Brachial Index:    Pulses:    Removal Indication and Assessment:    Wound Outcome:    Wound Image   11/05/24 0950   Dressing Appearance Intact;Moist drainage 11/05/24 0950   Drainage Amount Moderate 11/05/24 0950   Drainage Characteristics/Odor Serosanguineous;No odor 11/05/24 0950   Appearance Red;Moist;Sutures intact 11/05/24 0950   Tissue loss description Full thickness 11/05/24 0950   Black (%), Wound Tissue Color 0 % 11/05/24 0950   Red (%), Wound  "Tissue Color 100 % 11/05/24 0950   Yellow (%), Wound Tissue Color 0 % 11/05/24 0950   Periwound Area Pale white 11/05/24 0950   Wound Edges Defined;Open 11/05/24 0950   Wound Length (cm) 3.8 cm 11/05/24 0950   Wound Width (cm) 0.4 cm 11/05/24 0950   Wound Depth (cm) 1 cm 11/05/24 0950   Wound Volume (cm^3) 1.52 cm^3 11/05/24 0950   Wound Surface Area (cm^2) 1.52 cm^2 11/05/24 0950   Tunneling (depth (cm)/location) 1 cm @ 11 o'clock 11/05/24 0950   Undermining (depth (cm)/location) 0 11/05/24 0950   Care Cleansed with:;Antimicrobial agent;Sterile normal saline 11/05/24 0950   Dressing Changed;Collagen;Absorptive Pad;Foam;Cast padding;Tubular bandage 11/05/24 0950   Periwound Care Moisture barrier applied;Absorptive dressing applied 11/05/24 0950   Compression Tubular elasticized bandage 11/05/24 0950   Off Loading Football dressing;Off loading shoe 11/05/24 0950   Dressing Change Due 11/08/24 11/05/24 0950           Plan:     Wound Dressing Orders    Dressing change frequency three times a week and prn Nurse Visits  Remove old dressing  Cleanse or irrigate with: Normal Saline   Protect periwound with:  Gentian Violet    Left Distal Foot Wound  Primary dressing: WOUND BASE: Endoform then Iodosorb to wound bed  Secondary dressing: Kaltostat    Left Proximal Foot Wound  Primary dressing: WOUND BASE: Retention Sutures in-place, Endoform to open areas of wound bed beneath sutures then Iodosorb  Secondary dressing: Mextra size 5" x 9" (laid vertically) then Abram Foam long x 3 to cushion  Compression/Off-load: Tubigrip, single layer, size D. Open Toe Football (cast padding x 2 rolls) and CAM boot on the affected foot             Follow up in about 3 days (around 11/8/2024) for Wound Care.        "

## 2024-11-05 ENCOUNTER — HOSPITAL ENCOUNTER (OUTPATIENT)
Dept: WOUND CARE | Facility: HOSPITAL | Age: 54
Discharge: HOME | End: 2024-11-05
Payer: MEDICARE

## 2024-11-05 VITALS
RESPIRATION RATE: 16 BRPM | TEMPERATURE: 97 F | DIASTOLIC BLOOD PRESSURE: 62 MMHG | SYSTOLIC BLOOD PRESSURE: 107 MMHG | HEART RATE: 97 BPM

## 2024-11-05 DIAGNOSIS — E11.621 DIABETIC ULCER OF LEFT MIDFOOT ASSOCIATED WITH TYPE 2 DIABETES MELLITUS, WITH BONE INVOLVEMENT WITHOUT EVIDENCE OF NECROSIS: ICD-10-CM

## 2024-11-05 DIAGNOSIS — M86.10 ACUTE ON CHRONIC OSTEOMYELITIS: Primary | ICD-10-CM

## 2024-11-05 DIAGNOSIS — M86.60 ACUTE ON CHRONIC OSTEOMYELITIS: Primary | ICD-10-CM

## 2024-11-05 DIAGNOSIS — L97.426 DIABETIC ULCER OF LEFT MIDFOOT ASSOCIATED WITH TYPE 2 DIABETES MELLITUS, WITH BONE INVOLVEMENT WITHOUT EVIDENCE OF NECROSIS: ICD-10-CM

## 2024-11-05 PROCEDURE — 99213 OFFICE O/P EST LOW 20 MIN: CPT | Mod: HCNC

## 2024-11-08 ENCOUNTER — HOSPITAL ENCOUNTER (OUTPATIENT)
Dept: WOUND CARE | Facility: HOSPITAL | Age: 54
Discharge: HOME | End: 2024-11-08
Attending: PODIATRIST
Payer: MEDICARE

## 2024-11-08 VITALS
SYSTOLIC BLOOD PRESSURE: 119 MMHG | HEART RATE: 87 BPM | RESPIRATION RATE: 14 BRPM | TEMPERATURE: 98 F | DIASTOLIC BLOOD PRESSURE: 71 MMHG

## 2024-11-08 DIAGNOSIS — M86.10 ACUTE ON CHRONIC OSTEOMYELITIS: Primary | ICD-10-CM

## 2024-11-08 DIAGNOSIS — E11.621 DIABETIC ULCER OF LEFT MIDFOOT ASSOCIATED WITH TYPE 2 DIABETES MELLITUS, WITH BONE INVOLVEMENT WITHOUT EVIDENCE OF NECROSIS: ICD-10-CM

## 2024-11-08 DIAGNOSIS — L97.426 DIABETIC ULCER OF LEFT MIDFOOT ASSOCIATED WITH TYPE 2 DIABETES MELLITUS, WITH BONE INVOLVEMENT WITHOUT EVIDENCE OF NECROSIS: ICD-10-CM

## 2024-11-08 DIAGNOSIS — M86.60 ACUTE ON CHRONIC OSTEOMYELITIS: Primary | ICD-10-CM

## 2024-11-08 PROCEDURE — 99213 OFFICE O/P EST LOW 20 MIN: CPT | Mod: HCNC | Performed by: PODIATRIST

## 2024-11-08 NOTE — PROGRESS NOTES
[facility]                Progress Note    Subjective:       Patient ID: Indio Ryder Jr. is a 54 y.o. male.    Chief Complaint: Wound Care, Dressing Change, and Diabetic Foot Ulcer    Follow Up wound care visit. Patient ambulated with assistance of knee scooter for LLE to [facility]. Prescribed CAM Boot on left Foot. Patient denies fever, chills, nausea, vomiting or diarrhea at present. Patient denies pain or discomfort to wound beds at present. Patient reports not having any issues with Tubigrip since last visit. Dressing appears clean, intact without strikethrough drainage. Dressing removed & wounds cleaned by nurse. Dressing discarded. Drainage has decreased with retention sutures being in-place. Left Plantar Distal Foot wound is measuring smaller. Left Plantar Proximal Distal Foot wound appears stable with sutures still intact.     No change to dressing order; applied per DPM order. Patient will return to [facility] in 1 week. Nurse visit scheduled for Tuesday in case patient is not seen by Dr. Reyes or dressing is not changed at visit, otherwise patient will cancel appointment and return on 11/14/24  and 11/19/24 for nurse visits then see DPM on 11/22/24. Patient declined AVS, has MyChart.           Review of Systems      Objective:        Physical Exam    Vitals:    11/08/24 0935   BP: 119/71   Pulse: 87   Resp: 14   Temp: 97.7 °F (36.5 °C)       Assessment:           ICD-10-CM ICD-9-CM   1. Acute on chronic osteomyelitis  M86.10 730.00    M86.60 730.10   2. Diabetic ulcer of left midfoot associated with type 2 diabetes mellitus, with bone involvement without evidence of necrosis  E11.621 250.80    L97.426 707.14            (Retired) Wound 04/08/22 1137 Diabetic Ulcer Left distal;plantar Foot (Active)   04/08/22 1137    Pre-existing: Yes   Primary Wound Type: Diabetic ulc   Side: Left   Orientation: distal;plantar   Location: Foot   Wound Number:    Ankle-Brachial Index:     Pulses:    Removal Indication and Assessment: not present upon hospital arrival   Wound Outcome:    (Retired) Wound Type:    (Retired) Wound Length (cm):    (Retired) Wound Width (cm):    (Retired) Depth (cm):    Wound Description (Comments):    Removal Indications:    Wound Image   11/08/24 1443   Wound WDL ex 11/08/24 1443   Dressing Appearance Intact;Moist drainage 11/08/24 1443   Drainage Amount Scant 11/08/24 1443   Drainage Characteristics/Odor Serosanguineous;No odor 11/08/24 1443   Appearance Pink;Moist;Epithelialization 11/08/24 1443   Tissue loss description Partial thickness 11/08/24 1443   Black (%), Wound Tissue Color 0 % 11/08/24 1443   Red (%), Wound Tissue Color 100 % 11/08/24 1443   Yellow (%), Wound Tissue Color 0 % 11/08/24 1443   Periwound Area Pale white;Dry 11/08/24 1443   Wound Edges Defined;Open 11/08/24 1443   Wound Length (cm) 0.2 cm 11/08/24 1443   Wound Width (cm) 0.2 cm 11/08/24 1443   Wound Depth (cm) 0.1 cm 11/08/24 1443   Wound Volume (cm^3) 0.004 cm^3 11/08/24 1443   Wound Surface Area (cm^2) 0.04 cm^2 11/08/24 1443   Tunneling (depth (cm)/location) 0 11/08/24 1443   Undermining (depth (cm)/location) 0 11/08/24 1443   Care Cleansed with:;Antimicrobial agent;Sterile normal saline 11/08/24 1443   Dressing Changed;Collagen;Absorptive Pad;Foam;Cast padding;Tubular bandage 11/08/24 1443   Periwound Care Moisture barrier applied;Absorptive dressing applied 11/08/24 1443   Compression Tubular elasticized bandage 11/08/24 1443   Off Loading Football dressing;Off loading shoe 11/08/24 1443   Dressing Change Due 11/12/24 11/08/24 1443            Wound 10/16/24 1211 Diabetic Ulcer Left proximal;plantar Foot (Active)   10/16/24 1211 Foot   Present on Original Admission: Y   Primary Wound Type: Diabetic ulc   Side: Left   Orientation: proximal;plantar   Wound Approximate Age at First Assessment (Weeks):    Wound Number:    Is this injury device related?: No   Incision Type:    Closure Method:     Wound Description (Comments):    Type:    Additional Comments:    Ankle-Brachial Index:    Pulses:    Removal Indication and Assessment:    Wound Outcome:    Wound Image   11/08/24 1443   Dressing Appearance Intact;Moist drainage 11/08/24 1443   Drainage Amount Moderate 11/08/24 1443   Drainage Characteristics/Odor Serosanguineous;No odor 11/08/24 1443   Appearance Pink;Moist;Sutures intact 11/08/24 1443   Tissue loss description Full thickness 11/08/24 1443   Black (%), Wound Tissue Color 0 % 11/08/24 1443   Red (%), Wound Tissue Color 100 % 11/08/24 1443   Yellow (%), Wound Tissue Color 0 % 11/08/24 1443   Periwound Area Pale white 11/08/24 1443   Wound Edges Defined;Open 11/08/24 1443   Wound Length (cm) 3.6 cm 11/08/24 1443   Wound Width (cm) 0.4 cm 11/08/24 1443   Wound Depth (cm) 1 cm 11/08/24 1443   Wound Volume (cm^3) 1.44 cm^3 11/08/24 1443   Wound Surface Area (cm^2) 1.44 cm^2 11/08/24 1443   Tunneling (depth (cm)/location) 1 cm @ 11 o'clock 11/08/24 1443   Undermining (depth (cm)/location) 0 11/08/24 1443   Care Cleansed with:;Antimicrobial agent;Sterile normal saline 11/08/24 1443   Dressing Changed;Collagen;Absorptive Pad;Foam;Cast padding;Tubular bandage 11/08/24 1443   Periwound Care Moisture barrier applied;Absorptive dressing applied 11/08/24 1443   Compression Tubular elasticized bandage 11/08/24 1443   Off Loading Football dressing;Off loading shoe 11/08/24 1443   Dressing Change Due 11/12/24 11/08/24 1443           Plan:              Tissue pathology and/or culture taken     [] Yes      [x] No  Sharp debridement performed                   [] Yes       [x] No  Labs ordered     [] Yes       [x] No  Imaging ordered    [] Yes      [x] No    Orders Placed This Encounter   Procedures    Change dressing     Wound Dressing Orders    Dressing change frequency two times a week and prn Nurse Visits  Remove old dressing  Cleanse or irrigate with: Normal Saline then Vinegar 5% soak for 10 minutes  Protect  "periwound with:  Gentian Violet    Left Distal Foot Wound  Primary dressing: WOUND BASE: Endoform then Iodosorb to wound bed  Secondary dressing: Kaltostat    Left Proximal Foot Wound  Primary dressing: WOUND BASE: Retention Sutures applied by DPM, Endoform to open areas of wound bed beneath sutures then Iodosorb, then Kaltostat  Secondary dressing: Mextra size 5" x 9" (laid vertically) then Abram Foam long x 3 to cushion  Compression/Off-load: Tubigrip, single layer, size D (okay to change to double layer if patient tolerates or swelling noted at nurse visit). Open Toe Football (cast padding x 2 rolls) and CAM boot on the affected foot    Change at Nurse Visit.        Follow up in about 4 days (around 11/12/2024) for Nurse Visit.       "

## 2024-11-11 ENCOUNTER — OFFICE VISIT (OUTPATIENT)
Dept: PODIATRY | Facility: CLINIC | Age: 54
End: 2024-11-11
Payer: MEDICARE

## 2024-11-11 VITALS
BODY MASS INDEX: 28.49 KG/M2 | DIASTOLIC BLOOD PRESSURE: 59 MMHG | WEIGHT: 214.94 LBS | HEIGHT: 73 IN | HEART RATE: 96 BPM | SYSTOLIC BLOOD PRESSURE: 98 MMHG

## 2024-11-11 DIAGNOSIS — M86.672 CHRONIC OSTEOMYELITIS OF LEFT FOOT: Primary | ICD-10-CM

## 2024-11-11 DIAGNOSIS — E11.42 POORLY CONTROLLED TYPE 2 DIABETES MELLITUS WITH PERIPHERAL NEUROPATHY: ICD-10-CM

## 2024-11-11 DIAGNOSIS — M21.172 VARUS FOOT DEFORMITY, ACQUIRED, LEFT: ICD-10-CM

## 2024-11-11 DIAGNOSIS — E11.65 POORLY CONTROLLED TYPE 2 DIABETES MELLITUS WITH PERIPHERAL NEUROPATHY: ICD-10-CM

## 2024-11-11 DIAGNOSIS — L97.426 DIABETIC ULCER OF LEFT MIDFOOT ASSOCIATED WITH DIABETES MELLITUS DUE TO UNDERLYING CONDITION, WITH BONE INVOLVEMENT WITHOUT EVIDENCE OF NECROSIS: ICD-10-CM

## 2024-11-11 DIAGNOSIS — I73.9 PVD (PERIPHERAL VASCULAR DISEASE): ICD-10-CM

## 2024-11-11 DIAGNOSIS — E08.621 DIABETIC ULCER OF LEFT MIDFOOT ASSOCIATED WITH DIABETES MELLITUS DUE TO UNDERLYING CONDITION, WITH BONE INVOLVEMENT WITHOUT EVIDENCE OF NECROSIS: ICD-10-CM

## 2024-11-11 DIAGNOSIS — M19.072 ARTHROSIS OF LEFT ANKLE: ICD-10-CM

## 2024-11-11 PROCEDURE — 3046F HEMOGLOBIN A1C LEVEL >9.0%: CPT | Mod: CPTII,S$GLB,, | Performed by: PODIATRIST

## 2024-11-11 PROCEDURE — 1111F DSCHRG MED/CURRENT MED MERGE: CPT | Mod: CPTII,S$GLB,, | Performed by: PODIATRIST

## 2024-11-11 PROCEDURE — 99999 PR PBB SHADOW E&M-EST. PATIENT-LVL III: CPT | Mod: PBBFAC,HCNC,, | Performed by: PODIATRIST

## 2024-11-11 PROCEDURE — 3078F DIAST BP <80 MM HG: CPT | Mod: CPTII,S$GLB,, | Performed by: PODIATRIST

## 2024-11-11 PROCEDURE — 1160F RVW MEDS BY RX/DR IN RCRD: CPT | Mod: CPTII,S$GLB,, | Performed by: PODIATRIST

## 2024-11-11 PROCEDURE — 99214 OFFICE O/P EST MOD 30 MIN: CPT | Mod: S$GLB,,, | Performed by: PODIATRIST

## 2024-11-11 PROCEDURE — 3008F BODY MASS INDEX DOCD: CPT | Mod: CPTII,S$GLB,, | Performed by: PODIATRIST

## 2024-11-11 PROCEDURE — 1159F MED LIST DOCD IN RCRD: CPT | Mod: CPTII,S$GLB,, | Performed by: PODIATRIST

## 2024-11-11 PROCEDURE — 3074F SYST BP LT 130 MM HG: CPT | Mod: CPTII,S$GLB,, | Performed by: PODIATRIST

## 2024-11-11 NOTE — PROGRESS NOTES
Subjective:     Patient ID: Indio Ryder Jr. is a 54 y.o. male.    Chief Complaint: Diabetic Foot Ulcer, surgical consult    Indio is a 54 y.o. male who presents to the clinic for evaluation and treatment of high risk feet. 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), Colon adenocarcinoma (04/12/2022), Diabetic ketoacidosis without coma associated with type 2 diabetes mellitus (05/30/2017), Diabetic ulcer of right foot associated with type 2 diabetes mellitus (06/03/2015), Disorder of kidney and ureter, Essential hypertension (06/06/2013), Group B streptococcal infection (01/13/2017), Mixed hyperlipidemia (08/12/2014), Septic arthritis of left foot (04/28/2021), Shoulder impingement (08/12/2014), Subacute osteomyelitis of right foot (01/12/2017), Traumatic amputation of fifth toe of right foot (07/02/2015), and Type 2 diabetes mellitus with diabetic neuropathy, with long-term current use of insulin (05/03/2016). The patient's chief complaint is foot ulcer along the left plantar midfoot.  Patient had been seeing Dr. Peterson in the [facility] on a biweekly basis for management of his left foot ulceration. He presents today for surgical consultation, evaluation for rearfoot fusion in regards to his left foot and ankle.  His last A1c was obtained in April, greater than 14.0.  Patient has had extensive pedal surgical history, last having had a debridement per Dr. Peterson in July of 2023. Patient had received a custom ankle brace in January and states that he subsequently had developed an ulceration as a result of it; previous to this, the ulceration of his left midfoot had healed over.     09/16/2024:  Follow-up from updated weight-bearing left foot and ankle x-ray imaging and lab work.  Seen per  in wound clinic on weekly basis.  Ambulating with cam boot.  States he is in the process of adjusting his medications due to uncontrolled hyperglycemia.    11/11/2024:  Returns for  re-evaluation of progressive varus deformity to left foot and chronic ulceration followed weekly at [facility].  States his hemoglobin A1c improved and he is monitoring his continuous glucose monitor noting that his daily blood sugar values improved a lot since the last A1c was taken on 10/16/2024 had shown value of 11%.  Denies any pain.  Ambulating with tall Cam boot.  States he change his diet.    PCP: Lorena Thakur MD      Current shoe gear:  Affected Foot: Surgical boot, rolling knee scooter     Unaffected Foot: Extra depth shoes    Hemoglobin A1C   Date Value Ref Range Status   10/16/2024 11.0 (H) 4.0 - 5.6 % Final     Comment:     ADA Screening Guidelines:  5.7-6.4%  Consistent with prediabetes  >or=6.5%  Consistent with diabetes    High levels of fetal hemoglobin interfere with the HbA1C  assay. Heterozygous hemoglobin variants (HbS, HgC, etc)do  not significantly interfere with this assay.   However, presence of multiple variants may affect accuracy.     08/08/2024 >14.0 (H) 4.0 - 5.6 % Final     Comment:     ADA Screening Guidelines:  5.7-6.4%  Consistent with prediabetes  >or=6.5%  Consistent with diabetes    High levels of fetal hemoglobin interfere with the HbA1C  assay. Heterozygous hemoglobin variants (HbS, HgC, etc)do  not significantly interfere with this assay.   However, presence of multiple variants may affect accuracy.     04/20/2024 >14.0 (H) 4.0 - 5.6 % Final     Comment:     ADA Screening Guidelines:  5.7-6.4%  Consistent with prediabetes  >or=6.5%  Consistent with diabetes    High levels of fetal hemoglobin interfere with the HbA1C  assay. Heterozygous hemoglobin variants (HbS, HgC, etc)do  not significantly interfere with this assay.   However, presence of multiple variants may affect accuracy.         Review of Systems   Constitutional: Negative for chills and fever.   HENT:  Negative for congestion.    Eyes: Negative.    Cardiovascular:  Negative for chest pain.   Respiratory:   Negative for cough and shortness of breath.    Skin:  Positive for dry skin.   Neurological:  Positive for numbness.   Psychiatric/Behavioral:  Negative for altered mental status.         Objective:     Physical Exam  Vitals reviewed.   Constitutional:       General: He is not in acute distress.     Appearance: He is not ill-appearing.   Cardiovascular:      Comments: Capillary refill greater than 3 seconds to left digits.     Pedal pulses heard only through Doppler bilateral:    Triphasic PT, monophasic DP left   Monophasic PT, monophasic DP right  Musculoskeletal:         General: No swelling or tenderness.      Right lower leg: No edema.      Left lower leg: No edema.      Comments: Amputated left 5th ray.  Moderate flexible varus rotation to the left foot noted.  Left ankle range motion difficult to assess but noted to be 0° dorsiflexion to neutral with the knee extended at the ankle.  No pain on palpation or range motion to the foot and ankle bilateral.  Crepitus with attempted left ankle range motion noted.   Feet:      Right foot:      Protective Sensation: 10 sites tested.  0 sites sensed.      Skin integrity: Ulcer present.      Left foot:      Protective Sensation: 10 sites tested.  0 sites sensed.   Skin:     General: Skin is warm and dry.      Findings: No ecchymosis or erythema.      Nails: There is no clubbing.      Comments: Referred to attached media picture for plantar lateral left midfoot ulceration which has intact sutures from previous debridement.   Neurological:      Mental Status: He is alert and oriented to person, place, and time.      Cranial Nerves: No cranial nerve deficit.      Sensory: Sensory deficit present.      Comments: 0/10 [place] monofilament bilateral   Psychiatric:         Mood and Affect: Mood normal.                 Assessment:      Encounter Diagnoses   Name Primary?    Chronic osteomyelitis of left foot Yes    Poorly controlled type 2 diabetes mellitus with  peripheral neuropathy     PVD (peripheral vascular disease)     Varus foot deformity, acquired, left     Arthrosis of left ankle     Diabetic ulcer of left midfoot associated with diabetes mellitus due to underlying condition, with bone involvement without evidence of necrosis      Plan:     Indio was seen today for diabetic foot ulcer.    Diagnoses and all orders for this visit:    Chronic osteomyelitis of left foot  -     CT Ankle (Including Hindfoot) With Contrast Left; Future  -     POCT Glycosylated Hemoglobin (HGB A1c)    Poorly controlled type 2 diabetes mellitus with peripheral neuropathy  -     POCT Glycosylated Hemoglobin (HGB A1c)    PVD (peripheral vascular disease)    Varus foot deformity, acquired, left  -     CT Ankle (Including Hindfoot) With Contrast Left; Future    Arthrosis of left ankle  -     CT Ankle (Including Hindfoot) With Contrast Left; Future    Diabetic ulcer of left midfoot associated with diabetes mellitus due to underlying condition, with bone involvement without evidence of necrosis      I counseled the patient on his conditions, their implications and medical management.    Reviewed follow-up left foot x-ray completed on 10/16/2024 which had shown significant arthrosis of the left ankle with prior large osteochondral fragment involving the talar dome and moderate osteophytic lipping along the distal anterior tibia and posterior ankle joint.  There is complete 4th and 5th ray resection with varus rotation of the left foot.  Arthritic changes involving the NC joint of the left foot.    Patient's hemoglobin A1c improved to 11%.  We discussed achieving a tighter glycemic index closer to 8% prior to surgical consideration.    CT scan with contrast of the left foot and ankle ordered to further assess.  We briefly discussed performing a pantalar arthrodesis to help stabilize the left hindfoot and ankle.  Risks and benefits discussed.    Applied Hydrofera ready blue to the wound site  followed by a surrounding felt aperture pad secured with cast padding and Coban forming football dressing.  Tall cam boot reapplied.  Patient to follow up in Wound Clinic.    Patient to repeat hemoglobin A1c in 1 month.    RTC p.r.n. as discussed     Assisted by Joshua Rudd DPM PGY 2    A portion of this note was generated by voice recognition software and may contain spelling and grammar errors.

## 2024-11-14 ENCOUNTER — OFFICE VISIT (OUTPATIENT)
Dept: INFECTIOUS DISEASES | Facility: CLINIC | Age: 54
End: 2024-11-14
Payer: MEDICARE

## 2024-11-14 ENCOUNTER — HOSPITAL ENCOUNTER (OUTPATIENT)
Dept: WOUND CARE | Facility: HOSPITAL | Age: 54
Discharge: HOME | End: 2024-11-14
Payer: MEDICARE

## 2024-11-14 ENCOUNTER — LAB VISIT (OUTPATIENT)
Dept: LAB | Facility: HOSPITAL | Age: 54
End: 2024-11-14
Attending: INTERNAL MEDICINE
Payer: MEDICARE

## 2024-11-14 VITALS
DIASTOLIC BLOOD PRESSURE: 57 MMHG | RESPIRATION RATE: 14 BRPM | HEART RATE: 107 BPM | TEMPERATURE: 99 F | HEIGHT: 73 IN | DIASTOLIC BLOOD PRESSURE: 68 MMHG | SYSTOLIC BLOOD PRESSURE: 84 MMHG | TEMPERATURE: 98 F | BODY MASS INDEX: 26.06 KG/M2 | SYSTOLIC BLOOD PRESSURE: 116 MMHG | WEIGHT: 196.63 LBS | HEART RATE: 70 BPM

## 2024-11-14 DIAGNOSIS — M86.10 ACUTE ON CHRONIC OSTEOMYELITIS: ICD-10-CM

## 2024-11-14 DIAGNOSIS — E11.621 DIABETIC ULCER OF LEFT MIDFOOT ASSOCIATED WITH TYPE 2 DIABETES MELLITUS, WITH BONE INVOLVEMENT WITHOUT EVIDENCE OF NECROSIS: Primary | ICD-10-CM

## 2024-11-14 DIAGNOSIS — M86.60 ACUTE ON CHRONIC OSTEOMYELITIS: ICD-10-CM

## 2024-11-14 DIAGNOSIS — L97.426 DIABETIC ULCER OF LEFT MIDFOOT ASSOCIATED WITH TYPE 2 DIABETES MELLITUS, WITH BONE INVOLVEMENT WITHOUT EVIDENCE OF NECROSIS: Primary | ICD-10-CM

## 2024-11-14 DIAGNOSIS — M86.60 ACUTE ON CHRONIC OSTEOMYELITIS: Primary | ICD-10-CM

## 2024-11-14 DIAGNOSIS — M86.10 ACUTE ON CHRONIC OSTEOMYELITIS: Primary | ICD-10-CM

## 2024-11-14 LAB
ALBUMIN SERPL BCP-MCNC: 2.6 G/DL (ref 3.5–5.2)
ALP SERPL-CCNC: 178 U/L (ref 40–150)
ALT SERPL W/O P-5'-P-CCNC: 9 U/L (ref 10–44)
ANION GAP SERPL CALC-SCNC: 11 MMOL/L (ref 8–16)
AST SERPL-CCNC: 15 U/L (ref 10–40)
BASOPHILS # BLD AUTO: 0.05 K/UL (ref 0–0.2)
BASOPHILS NFR BLD: 0.7 % (ref 0–1.9)
BILIRUB SERPL-MCNC: 0.2 MG/DL (ref 0.1–1)
BUN SERPL-MCNC: 19 MG/DL (ref 6–20)
CALCIUM SERPL-MCNC: 9.5 MG/DL (ref 8.7–10.5)
CHLORIDE SERPL-SCNC: 105 MMOL/L (ref 95–110)
CO2 SERPL-SCNC: 25 MMOL/L (ref 23–29)
CREAT SERPL-MCNC: 1.2 MG/DL (ref 0.5–1.4)
CRP SERPL-MCNC: 4.6 MG/L (ref 0–8.2)
DIFFERENTIAL METHOD BLD: ABNORMAL
EOSINOPHIL # BLD AUTO: 0.1 K/UL (ref 0–0.5)
EOSINOPHIL NFR BLD: 1.6 % (ref 0–8)
ERYTHROCYTE [DISTWIDTH] IN BLOOD BY AUTOMATED COUNT: 14.1 % (ref 11.5–14.5)
EST. GFR  (NO RACE VARIABLE): >60 ML/MIN/1.73 M^2
GLUCOSE SERPL-MCNC: 99 MG/DL (ref 70–110)
HCT VFR BLD AUTO: 34.6 % (ref 40–54)
HGB BLD-MCNC: 11 G/DL (ref 14–18)
IMM GRANULOCYTES # BLD AUTO: 0.03 K/UL (ref 0–0.04)
IMM GRANULOCYTES NFR BLD AUTO: 0.4 % (ref 0–0.5)
LYMPHOCYTES # BLD AUTO: 2.4 K/UL (ref 1–4.8)
LYMPHOCYTES NFR BLD: 33.5 % (ref 18–48)
MCH RBC QN AUTO: 29.3 PG (ref 27–31)
MCHC RBC AUTO-ENTMCNC: 31.8 G/DL (ref 32–36)
MCV RBC AUTO: 92 FL (ref 82–98)
MONOCYTES # BLD AUTO: 0.5 K/UL (ref 0.3–1)
MONOCYTES NFR BLD: 6.8 % (ref 4–15)
NEUTROPHILS # BLD AUTO: 4 K/UL (ref 1.8–7.7)
NEUTROPHILS NFR BLD: 57 % (ref 38–73)
NRBC BLD-RTO: 0 /100 WBC
PLATELET # BLD AUTO: 544 K/UL (ref 150–450)
PMV BLD AUTO: 9.1 FL (ref 9.2–12.9)
POTASSIUM SERPL-SCNC: 3.9 MMOL/L (ref 3.5–5.1)
PROT SERPL-MCNC: 7.9 G/DL (ref 6–8.4)
RBC # BLD AUTO: 3.75 M/UL (ref 4.6–6.2)
SODIUM SERPL-SCNC: 141 MMOL/L (ref 136–145)
WBC # BLD AUTO: 7.08 K/UL (ref 3.9–12.7)

## 2024-11-14 PROCEDURE — 86140 C-REACTIVE PROTEIN: CPT | Performed by: INTERNAL MEDICINE

## 2024-11-14 PROCEDURE — 99213 OFFICE O/P EST LOW 20 MIN: CPT

## 2024-11-14 PROCEDURE — 99214 OFFICE O/P EST MOD 30 MIN: CPT | Mod: S$GLB,,, | Performed by: INTERNAL MEDICINE

## 2024-11-14 PROCEDURE — 1159F MED LIST DOCD IN RCRD: CPT | Mod: CPTII,S$GLB,, | Performed by: INTERNAL MEDICINE

## 2024-11-14 PROCEDURE — 3008F BODY MASS INDEX DOCD: CPT | Mod: CPTII,S$GLB,, | Performed by: INTERNAL MEDICINE

## 2024-11-14 PROCEDURE — 3046F HEMOGLOBIN A1C LEVEL >9.0%: CPT | Mod: CPTII,S$GLB,, | Performed by: INTERNAL MEDICINE

## 2024-11-14 PROCEDURE — 99999 PR PBB SHADOW E&M-EST. PATIENT-LVL III: CPT | Mod: PBBFAC,,, | Performed by: INTERNAL MEDICINE

## 2024-11-14 PROCEDURE — 3078F DIAST BP <80 MM HG: CPT | Mod: CPTII,S$GLB,, | Performed by: INTERNAL MEDICINE

## 2024-11-14 PROCEDURE — 80053 COMPREHEN METABOLIC PANEL: CPT | Performed by: INTERNAL MEDICINE

## 2024-11-14 PROCEDURE — 36415 COLL VENOUS BLD VENIPUNCTURE: CPT | Performed by: INTERNAL MEDICINE

## 2024-11-14 PROCEDURE — 3074F SYST BP LT 130 MM HG: CPT | Mod: CPTII,S$GLB,, | Performed by: INTERNAL MEDICINE

## 2024-11-14 PROCEDURE — 85025 COMPLETE CBC W/AUTO DIFF WBC: CPT | Performed by: INTERNAL MEDICINE

## 2024-11-14 PROCEDURE — 1111F DSCHRG MED/CURRENT MED MERGE: CPT | Mod: CPTII,S$GLB,, | Performed by: INTERNAL MEDICINE

## 2024-11-14 NOTE — PROGRESS NOTES
Infectious Disease Clinic Note  11/18/2024       Subjective:       Patient ID: Indio Ryder Jr. is a 54 y.o. male being seen for an new visit.    Chief Complaint: Follow-up    HPI    Mr. Ryder is a 53 y/o M with hx of DMT2 (Alc 11% 10/16/2024), HTN, HLD, and numerous episodes of foot osteomyelitis with L foot chronic osteomyelitis recently admitted 10/16-10/22 for AMS, found to have DKA, GIB, GNR bacteremia due to acute OM of L foot presents for hospital follow-up.     Presented to ER on 10/16/2024 with AMS and hematemesis. Admitted for management of DKA and possible GI bleed. Patient follows closely with podiatry and wound care for chronic osteo. Patient recently completed 8 weeks of IV Dapto for chronic osteo (ended IV abx 9/25/2024). Bone culture and pathology sent from recent podiatry visit (10/11/2024). Bone culture of L foot (10/11/2024) grew Pseudomonas aeruginosa. Blood cx (10/16/2024) grew Pseudomonas aeruginosa and Klebsiella aerogenes. Patient started on IV Cefepime. Left foot X-ray (10/18/2024) notable for acute OM of the 3rd metatarsal base, cuboid, and lateral cuneiform. Pathology (10/11/2024) confirmed acute osteo. Podiatry consulted, L foot debrided at bedside (10/17/2024), and no role for surgical intervention.     ID recommended  Cipro 750 mg po BID for a total duration of 6 weeks. Last day 11/29.    Labs from 10/28 with uptrending CRP 52-->134.    Feeling much better since hospital discharge. Denies fevers, chills, sweats, n/v, diarrhea. Reports adherence to cipro and tolerating well. Reports he has been keep bg at goal. Additionally notes wound has been improving. Is following closely with wound care.       Family History   Adopted: Yes   Problem Relation Name Age of Onset    Hypertension Mother      Cancer Mother          breast    Diabetes Mother      Hypertension Father      Cataracts Father      Heart disease Father          mi    Diabetes Sister Michelle     No Known Problems Brother       Heart disease Sister Manuela UMANZOR    No Known Problems Sister      Hypertension Maternal Aunt      No Known Problems Maternal Uncle      No Known Problems Paternal Aunt      No Known Problems Paternal Uncle      No Known Problems Maternal Grandmother      No Known Problems Maternal Grandfather      No Known Problems Paternal Grandmother      No Known Problems Paternal Grandfather      Amblyopia Neg Hx      Blindness Neg Hx      Glaucoma Neg Hx      Macular degeneration Neg Hx      Retinal detachment Neg Hx      Strabismus Neg Hx      Stroke Neg Hx      Thyroid disease Neg Hx       Social History     Socioeconomic History    Marital status: Single    Number of children: 0   Occupational History    Occupation: Retired     Employer: [employer]   Tobacco Use    Smoking status: Never    Smokeless tobacco: Never   Substance and Sexual Activity    Alcohol use: No    Drug use: No    Sexual activity: Yes     Partners: Female     Birth control/protection: Condom     Social Drivers of Health     Financial Resource Strain: Patient Declined (10/16/2024)    Overall Financial Resource Strain (CARDIA)     Difficulty of Paying Living Expenses: Patient declined   Food Insecurity: Patient Declined (10/16/2024)    Hunger Vital Sign     Worried About Running Out of Food in the Last Year: Patient declined     Ran Out of Food in the Last Year: Patient declined   Transportation Needs: Patient Declined (10/16/2024)    TRANSPORTATION NEEDS     Transportation : Patient declined   Physical Activity: Unknown (7/2/2024)    Exercise Vital Sign     Days of Exercise per Week: 0 days   Recent Concern: Physical Activity - Inactive (7/2/2024)    Exercise Vital Sign     Days of Exercise per Week: 0 days     Minutes of Exercise per Session: 0 min   Stress: Patient Declined (10/16/2024)    [place] [employer] - Occupational Stress Questionnaire     Feeling of Stress : Patient declined   Housing Stability: Patient  Declined (10/16/2024)    Housing Stability Vital Sign     Unable to Pay for Housing in the Last Year: Patient declined     Homeless in the Last Year: Patient declined     Past Surgical History:   Procedure Laterality Date    COLONOSCOPY Right 1/19/2022    Procedure: COLONOSCOPY;  Surgeon: Tj Haile MD;  Location: [facility] ENDO (4TH FLR);  Service: Endoscopy;  Laterality: Right;  previous order un-usable/poor prep 1/18/22  RAPID COVID test arrival 12:20  instructions handed to pt- sm    COLONOSCOPY N/A 3/21/2022    Procedure: COLONOSCOPY;  Surgeon: Sheng Haque MD;  Location: [facility] ENDO (2ND FLR);  Service: Endoscopy;  Laterality: N/A;  Colonoscopy with AES for hepatix flexure polyp removal, 2nd floor  Pt is fully vaccinated-DS  Pt prescribed Plavix by Dr. Stahl in Jan. 2022, however pt states that he never started taking it-DS  3/16/22-Instructions sent via portal-DS    COLONOSCOPY N/A 9/13/2023    Procedure: COLONOSCOPY;  Surgeon: Erik Stout MD;  Location: [facility] ENDO (4TH FLR);  Service: Colon and Rectal;  Laterality: N/A;  Referred by Dr. Stout, [facility], Meds, portal -ml    DEBRIDEMENT OF FOOT Left 7/14/2019    Procedure: DEBRIDEMENT, FOOT, with left 5th ray amputation;  Surgeon: Sis Hickman DPM;  Location: [facility] OR 2ND FLR;  Service: Podiatry;  Laterality: Left;    DEBRIDEMENT OF FOOT Left 7/17/2019    Procedure: DEBRIDEMENT, FOOT with leftt 5th ray partial amputation with Neox Graft;  Surgeon: Mai Burrell DPM;  Location: [facility] OR 2ND FLR;  Service: Podiatry;  Laterality: Left;  t    DEBRIDEMENT OF FOOT Bilateral 4/29/2021    Procedure: DEBRIDEMENT, FOOT;  Surgeon: Mai Burrell DPM;  Location: [facility] OR 1ST FLR;  Service: Podiatry;  Laterality: Bilateral;  Graft application    DEBRIDEMENT OF FOOT Left 7/31/2023    Procedure: DEBRIDEMENT, FOOT;  Surgeon: Marivel Peterson DPM;  Location: [facility] OR 2ND FLR;  Service: Podiatry;  Laterality: Left;    TOE AMPUTATION Right 06/05/2015  "   TOE AMPUTATION Right 01/19/2017    XI ROBOTIC COLECTOMY, RIGHT N/A 4/25/2022    Procedure: XI ROBOTIC COLECTOMY, RIGHT (lithotomy, eras low);  Surgeon: Erik Stout MD;  [facility];  Service: Colon and Rectal;  Laterality: N/A;  Paruch to [place]    XI ROBOTIC COLECTOMY, RIGHT  4/25/2022    Procedure: ;  Surgeon: Erik Stout MD;  [facility];  Service: Colon and Rectal;;       Patient's Medications   New Prescriptions    No medications on file   Previous Medications    BLOOD SUGAR DIAGNOSTIC STRP    use to test blood sugar 4 times daily    BLOOD-GLUCOSE METER MISC    Use as instructed    CIPROFLOXACIN HCL (CIPRO) 750 MG TABLET    Take 1 tablet (750 mg total) by mouth every 12 (twelve) hours.    EMPAGLIFLOZIN (JARDIANCE) 25 MG TABLET    Take 1 tablet (25 mg total) by mouth once daily.    INSULIN ASPART U-100 (NOVOLOG) 100 UNIT/ML (3 ML) INPN PEN    Inject if sugar is > 180 up to 4x a day , 180-230+2, 231-280+4, 281-330+6, 331-380+8, >380+10. Max daily 40 units    INSULIN GLARGINE U-300 CONC (TOUJEO MAX U-300 SOLOSTAR) 300 UNIT/ML (3 ML) INSULIN PEN    Inject 26 to 32 units under the skin at night    LANCETS 33 GAUGE MISC    1 lancet  by Misc.(Non-Drug; Combo Route) route 4 (four) times daily.    PANTOPRAZOLE (PROTONIX) 40 MG TABLET    Take 1 tablet (40 mg total) by mouth once daily.    PEN NEEDLE, DIABETIC (BD SASCHA 2ND GEN PEN NEEDLE) 32 GAUGE X 5/32" NDLE    Use 4 times a day   Modified Medications    No medications on file   Discontinued Medications    No medications on file       Patient Active Problem List    Diagnosis Date Noted    Colon adenocarcinoma 10/07/2024    Abscess of left arm 07/02/2024    Blister (nonthermal), left foot, sequela 02/16/2024    Blister (nonthermal), left foot, initial encounter 01/26/2024    Equinus deformity of both feet 11/17/2023    H/O amputation of lesser toe, right 11/17/2023    Right great toe amputee 11/17/2023    Chronic " osteomyelitis of left foot 10/20/2023    Type II diabetes mellitus with neurological manifestations 09/29/2023    Eversion deformity of foot, left 09/15/2023    Hx of amputation of lesser toe, left 09/15/2023    Pre-ulcerative calluses 06/30/2023    Chronic osteomyelitis of right foot 12/09/2022    Diabetic ulcer of right foot associated with type 2 diabetes mellitus, with fat layer exposed 04/26/2022    History of colon cancer 04/12/2022    Acute on chronic osteomyelitis 04/29/2021    Diabetic ulcer of left foot 04/28/2021    Septic arthritis of left foot 04/28/2021    Type 2 diabetes mellitus with 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 edema, with long-term current use of insulin 03/23/2021    Chronic left shoulder pain 07/30/2020    Allergic reaction due to antibacterial drug 04/05/2020    Subacute osteomyelitis of left foot 03/11/2020    Hypertensive retinopathy, bilateral 01/28/2020    Diabetic ulcer of left midfoot associated with type 2 diabetes mellitus, with bone involvement without evidence of necrosis 08/02/2019    Diabetic ulcer of right midfoot associated with type 2 diabetes mellitus, with necrosis of bone 07/12/2019    Neck pain 11/08/2017    Hypokalemia 05/30/2017    Actinomyces infection 01/17/2017     Right foot      Type 2 diabetes mellitus with hyperglycemia, with long-term current use of insulin 05/03/2016    Mixed hyperlipidemia 08/12/2014    Essential hypertension 06/06/2013       Review of Systems   Review of Systems   Constitutional:  Negative for chills, fever and malaise/fatigue.   HENT:  Negative for congestion and sore throat.    Eyes:  Negative for blurred vision and double vision.   Respiratory:  Negative for cough and shortness of breath.    Cardiovascular:  Negative for chest pain and palpitations.   Gastrointestinal:  Negative for diarrhea and vomiting.   Musculoskeletal:  Negative for myalgias and neck pain.   Skin:  Negative for itching and rash.  "  Neurological:  Negative for dizziness and headaches.   Psychiatric/Behavioral:  Negative for substance abuse. The patient is not nervous/anxious.    All other systems reviewed and are negative.          Objective:      BP (!) 84/57 (BP Location: Left arm, Patient Position: Sitting)   Pulse 107   Temp 98.5 °F (36.9 °C) (Oral)   Ht 6' 1" (1.854 m)   Wt 89.2 kg (196 lb 10.4 oz)   BMI 25.94 kg/m²   Estimated body mass index is 25.94 kg/m² as calculated from the following:    Height as of this encounter: 6' 1" (1.854 m).    Weight as of this encounter: 89.2 kg (196 lb 10.4 oz).    Physical Exam  Vitals and nursing note reviewed.   Constitutional:       Appearance: Normal appearance. He is not ill-appearing.   HENT:      Head: Normocephalic and atraumatic.      Nose: Nose normal. No congestion.      Mouth/Throat:      Mouth: Mucous membranes are moist.      Pharynx: Oropharynx is clear.   Eyes:      Conjunctiva/sclera: Conjunctivae normal.      Pupils: Pupils are equal, round, and reactive to light.   Cardiovascular:      Rate and Rhythm: Normal rate and regular rhythm.   Pulmonary:      Effort: Pulmonary effort is normal. No respiratory distress.      Breath sounds: Normal breath sounds.   Abdominal:      General: Abdomen is flat. There is no distension.      Palpations: Abdomen is soft.   Musculoskeletal:         General: No swelling. Normal range of motion.      Cervical back: Normal range of motion and neck supple.      Comments: L foot- clean dressing in place. Recent images reviewed. Wound still present with stitches.    Skin:     General: Skin is warm and dry.   Neurological:      General: No focal deficit present.      Mental Status: He is alert and oriented to person, place, and time.   Psychiatric:         Mood and Affect: Mood normal.         Behavior: Behavior normal.         Assessment:         1. Acute on chronic osteomyelitis  Comprehensive Metabolic Panel    CBC Auto Differential    C-REACTIVE " PROTEIN            Plan:       Indio was seen today for follow-up.    Diagnoses and all orders for this visit:    Acute on chronic osteomyelitis  55 y/o M with hx of uncontrolled DMT2  and numerous episodes of foot osteomyelitis with L foot chronic osteomyelitis recently admitted 10/16-10/22 for AMS, found to have DKA, GIB, GNR bacteremia (pseudomonas and kleb aerogenes) due to acute OM of L foot presents for hospital follow-up. Recent bone cx from 10/11 growing pseudomonas.     Plan:  Continue cipro to complete 6 wk course of therapy. End date 11/29.  Labs today to monitor for response to therapy and drug toxicity.  -     Comprehensive Metabolic Panel; Future  -     CBC Auto Differential; Future  -     C-REACTIVE PROTEIN; Future  Continue local wound care. F/u with podiatry and wound care    T2DM  Discussed he needs tight glucose control for wound healing and to avoid re-infection.        RTC for end of care.  Maria Dolores Tan MD  Infectious Disease     Total professional time spent for the encounter: 30 minutes  Time was spent preparing to see the patient, reviewing results of prior testing, obtaining and/or reviewing separately obtained history, performing a medically appropriate examination and interview, counseling and educating the patient/family, ordering medications/tests/procedures, referring and communicating with other health care professionals, documenting clinical information in the electronic health record, and independently interpreting results.

## 2024-11-14 NOTE — PROGRESS NOTES
[facility]  [address]  [id number]  Nurse Visit    Subjective:       Patient seen in clinic today. Patient ambulated with assistance of knee scooter for LLE to [facility]. Prescribed CAM Boot on left Foot. Patient denies fever, chills, nausea, vomiting or diarrhea at present. Patient denies pain or discomfort to wound beds at present. Dressing appears intact without strikethrough drainage. Dressing removed & wound cleaned by nurse.  Dressing discarded. Left Distal Plantar Foot wound is measuring smaller overall. Left Proximal Plantar Foot is measuring smaller in depth and sutures appear intact. Patient reports still taking oral Cipro as ordered without complaint. He reports seeing Dr. Reyes on Monday and will return in approximately 1 month pending next A1c level to discuss possible surgery plan. Patient declined AVS, has MyChart.  Dressing changed as ordered. Patient will return for nurse visit on Tuesday 11/19/24.    Vitals:    11/14/24 1100   BP: 116/68   Pulse: 70   Resp: 14   Temp: 98.2 °F (36.8 °C)         Assessment:          (Retired) Wound 04/08/22 1137 Diabetic Ulcer Left distal;plantar Foot (Active)   04/08/22 1137    Pre-existing: Yes   Primary Wound Type: Diabetic ulc   Side: Left   Orientation: distal;plantar   Location: Foot   Wound Number:    Ankle-Brachial Index:    Pulses:    Removal Indication and Assessment: not present upon hospital arrival   Wound Outcome:    (Retired) Wound Type:    (Retired) Wound Length (cm):    (Retired) Wound Width (cm):    (Retired) Depth (cm):    Wound Description (Comments):    Removal Indications:    Wound Image   11/14/24 1432   Wound WDL ex 11/14/24 1432   Dressing Appearance Intact;Moist drainage 11/14/24 1432   Drainage Amount Scant 11/14/24 1432   Drainage Characteristics/Odor Serous;No odor 11/14/24 1432   Appearance Pink;Moist 11/14/24 1432   Tissue loss description Partial thickness 11/14/24 1432   Black (%), Wound Tissue  Color 0 % 11/14/24 1432   Red (%), Wound Tissue Color 100 % 11/14/24 1432   Yellow (%), Wound Tissue Color 0 % 11/14/24 1432   Periwound Area Dry 11/14/24 1432   Wound Edges Defined;Open 11/14/24 1432   Wound Length (cm) 0.2 cm 11/14/24 1432   Wound Width (cm) 0.1 cm 11/14/24 1432   Wound Depth (cm) 0.05 cm 11/14/24 1432   Wound Volume (cm^3) 0.001 cm^3 11/14/24 1432   Wound Surface Area (cm^2) 0.02 cm^2 11/14/24 1432   Tunneling (depth (cm)/location) 0 11/14/24 1432   Undermining (depth (cm)/location) 0 11/14/24 1432   Care Cleansed with:;Antimicrobial agent;Sterile normal saline 11/14/24 1432   Dressing Changed;Collagen;Absorptive Pad;Foam;Cast padding;Tubular bandage 11/14/24 1432   Periwound Care Moisture barrier applied;Absorptive dressing applied 11/14/24 1432   Compression Tubular elasticized bandage 11/14/24 1432   Off Loading Football dressing;Off loading shoe 11/14/24 1432   Dressing Change Due 11/19/24 11/14/24 1432            Wound 10/16/24 1211 Diabetic Ulcer Left proximal;plantar Foot (Active)   10/16/24 1211 Foot   Present on Original Admission: Y   Primary Wound Type: Diabetic ulc   Side: Left   Orientation: proximal;plantar   Wound Approximate Age at First Assessment (Weeks):    Wound Number:    Is this injury device related?: No   Incision Type:    Closure Method:    Wound Description (Comments):    Type:    Additional Comments:    Ankle-Brachial Index:    Pulses:    Removal Indication and Assessment:    Wound Outcome:    Wound Image   11/14/24 1432   Dressing Appearance Intact;Moist drainage 11/14/24 1432   Drainage Amount Small 11/14/24 1432   Drainage Characteristics/Odor Serosanguineous;No odor 11/14/24 1432   Appearance Pink;Moist 11/14/24 1432   Tissue loss description Full thickness 11/14/24 1432   Black (%), Wound Tissue Color 0 % 11/14/24 1432   Red (%), Wound Tissue Color 100 % 11/14/24 1432   Yellow (%), Wound Tissue Color 0 % 11/14/24 1432   Periwound Area Pale white;Macerated 11/14/24  "1432   Wound Edges Defined;Open 11/14/24 1432   Wound Length (cm) 3.8 cm 11/14/24 1432   Wound Width (cm) 0.8 cm 11/14/24 1432   Wound Depth (cm) 0.6 cm 11/14/24 1432   Wound Volume (cm^3) 1.824 cm^3 11/14/24 1432   Wound Surface Area (cm^2) 3.04 cm^2 11/14/24 1432   Tunneling (depth (cm)/location) 0 11/14/24 1432   Undermining (depth (cm)/location) 0 11/14/24 1432   Care Cleansed with:;Antimicrobial agent;Sterile normal saline 11/14/24 1432   Dressing Changed;Collagen;Absorptive Pad;Foam;Cast padding;Tubular bandage 11/14/24 1432   Periwound Care Moisture barrier applied;Absorptive dressing applied 11/14/24 1432   Compression Tubular elasticized bandage 11/14/24 1432   Off Loading Football dressing;Off loading shoe 11/14/24 1432   Dressing Change Due 11/19/24 11/14/24 1432           Plan:     Wound Dressing Orders     Dressing change frequency two times a week and prn Nurse Visits   Remove old dressing   Cleanse or irrigate with: Normal Saline    Protect periwound with:  Gentian Violet     Left Distal Foot Wound   Primary dressing: WOUND BASE: Endoform then Iodosorb to wound bed   Secondary dressing: Kaltostat     Left Proximal Foot Wound   Primary dressing: WOUND BASE: Retention Sutures in-place, Endoform to open areas of wound bed beneath sutures then Iodosorb, then Kaltostat   Secondary dressing: Mextra size 5" x 9" (laid vertically) then Abram Foam long x 3 to cushion   Compression/Off-load: Open Toe Football (cast padding x 2 rolls). Tubigrip, single layer, size D. CAM boot on the affected foot           Follow up in about 5 days (around 11/19/2024) for Nurse Visit.        "

## 2024-11-15 ENCOUNTER — PATIENT MESSAGE (OUTPATIENT)
Dept: INFECTIOUS DISEASES | Facility: CLINIC | Age: 54
End: 2024-11-15
Payer: MEDICARE

## 2024-11-19 ENCOUNTER — HOSPITAL ENCOUNTER (OUTPATIENT)
Dept: RADIOLOGY | Facility: HOSPITAL | Age: 54
Discharge: HOME | End: 2024-11-19
Attending: PODIATRIST
Payer: MEDICARE

## 2024-11-19 ENCOUNTER — HOSPITAL ENCOUNTER (OUTPATIENT)
Dept: WOUND CARE | Facility: HOSPITAL | Age: 54
Discharge: HOME | End: 2024-11-19
Payer: MEDICARE

## 2024-11-19 VITALS
HEART RATE: 94 BPM | SYSTOLIC BLOOD PRESSURE: 132 MMHG | DIASTOLIC BLOOD PRESSURE: 72 MMHG | TEMPERATURE: 98 F | RESPIRATION RATE: 14 BRPM

## 2024-11-19 DIAGNOSIS — E11.621 DIABETIC ULCER OF LEFT MIDFOOT ASSOCIATED WITH TYPE 2 DIABETES MELLITUS, WITH BONE INVOLVEMENT WITHOUT EVIDENCE OF NECROSIS: Primary | ICD-10-CM

## 2024-11-19 DIAGNOSIS — M86.672 CHRONIC OSTEOMYELITIS OF LEFT FOOT: ICD-10-CM

## 2024-11-19 DIAGNOSIS — L97.426 DIABETIC ULCER OF LEFT MIDFOOT ASSOCIATED WITH TYPE 2 DIABETES MELLITUS, WITH BONE INVOLVEMENT WITHOUT EVIDENCE OF NECROSIS: Primary | ICD-10-CM

## 2024-11-19 DIAGNOSIS — M21.172 VARUS FOOT DEFORMITY, ACQUIRED, LEFT: ICD-10-CM

## 2024-11-19 DIAGNOSIS — M86.10 ACUTE ON CHRONIC OSTEOMYELITIS: ICD-10-CM

## 2024-11-19 DIAGNOSIS — M86.60 ACUTE ON CHRONIC OSTEOMYELITIS: ICD-10-CM

## 2024-11-19 DIAGNOSIS — M19.072 ARTHROSIS OF LEFT ANKLE: ICD-10-CM

## 2024-11-19 PROCEDURE — 25500020 PHARM REV CODE 255: Performed by: PODIATRIST

## 2024-11-19 PROCEDURE — 99213 OFFICE O/P EST LOW 20 MIN: CPT | Mod: 25

## 2024-11-19 PROCEDURE — 73701 CT LOWER EXTREMITY W/DYE: CPT | Mod: 26,LT,, | Performed by: INTERNAL MEDICINE

## 2024-11-19 PROCEDURE — 73701 CT LOWER EXTREMITY W/DYE: CPT | Mod: TC,LT

## 2024-11-19 RX ADMIN — IOHEXOL 100 ML: 350 INJECTION, SOLUTION INTRAVENOUS at 01:11

## 2024-11-19 NOTE — PROGRESS NOTES
[facility]  [address]  [id number]  Nurse Visit    Subjective:       Patient seen in clinic today. Patient ambulated with assistance of knee scooter  for LLE to [facility]. Prescribed Walker Boot on Left Foot and Diabetic Tennis shoe with Lift on Right Foot. Patient denies fever, chills, nausea, vomiting or diarrhea at present. Patient denies pain or discomfort to wound beds at present. Dressing appears intact without strikethrough drainage. Dressing removed & wounds cleaned by nurse. Dressing discarded. Left Plantar Foot wound appear stable. DTI noted to Left Lateral 4th Toe appears to have spread, will apply protective dressing. Petechiae spots noted on Left 2nd and 3rd Toes (see media), not new wounds to Left Toe or Right Toes (see media). Patient denies having toes open to air without socks or prescribed shoes. Patient declined AVS, has MyChart.  Dressing changed as ordered.    Vitals:    11/19/24 1022   BP: 132/72   Pulse: 94   Resp: 14   Temp: 97.8 °F (36.6 °C)         Assessment:          (Retired) Wound 04/08/22 1137 Diabetic Ulcer Left distal;plantar Foot (Active)   04/08/22 1137    Pre-existing: Yes   Primary Wound Type: Diabetic ulc   Side: Left   Orientation: distal;plantar   Location: Foot   Wound Number:    Ankle-Brachial Index:    Pulses:    Removal Indication and Assessment: not present upon hospital arrival   Wound Outcome:    (Retired) Wound Type:    (Retired) Wound Length (cm):    (Retired) Wound Width (cm):    (Retired) Depth (cm):    Wound Description (Comments):    Removal Indications:    Wound Image   11/19/24 1040   Wound WDL ex 11/19/24 1040   Dressing Appearance Moist drainage 11/19/24 1040   Drainage Amount Scant 11/19/24 1040   Drainage Characteristics/Odor Serosanguineous;No odor 11/19/24 1040   Appearance Mackey 11/19/24 1040   Tissue loss description Partial thickness 11/19/24 1040   Black (%), Wound Tissue Color 0 % 11/19/24 1040   Red (%), Wound Tissue  Color 0 % 11/19/24 1040   Yellow (%), Wound Tissue Color 100 % 11/19/24 1040   Periwound Area Dry;Intact 11/19/24 1040   Wound Edges Defined;Open 11/19/24 1040   Care Cleansed with:;Antimicrobial agent;Sterile normal saline;Wound cleanser 11/19/24 1040   Dressing Changed;Calcium alginate;Absorptive Pad;Foam;Cast padding;Tubular bandage 11/19/24 1040   Periwound Care Moisture barrier applied;Absorptive dressing applied 11/19/24 1040   Compression Tubular elasticized bandage 11/19/24 1040   Off Loading Football dressing;Off loading shoe 11/19/24 1040   Dressing Change Due 11/22/24 11/19/24 1040            Wound 10/16/24 1211 Diabetic Ulcer Left proximal;plantar Foot (Active)   10/16/24 1211 Foot   Present on Original Admission: Y   Primary Wound Type: Diabetic ulc   Side: Left   Orientation: proximal;plantar   Wound Approximate Age at First Assessment (Weeks):    Wound Number:    Is this injury device related?: No   Incision Type:    Closure Method:    Wound Description (Comments):    Type:    Additional Comments:    Ankle-Brachial Index:    Pulses:    Removal Indication and Assessment:    Wound Outcome:    Wound Image   11/19/24 1040   Dressing Appearance Intact;Moist drainage 11/19/24 1040   Drainage Amount Moderate 11/19/24 1040   Drainage Characteristics/Odor Serosanguineous;No odor 11/19/24 1040   Appearance Pink;Moist;Sutures intact 11/19/24 1040   Tissue loss description Full thickness 11/19/24 1040   Black (%), Wound Tissue Color 0 % 11/19/24 1040   Red (%), Wound Tissue Color 100 % 11/19/24 1040   Yellow (%), Wound Tissue Color 0 % 11/19/24 1040   Periwound Area Pale white;Macerated 11/19/24 1040   Wound Edges Defined 11/19/24 1040   Wound Length (cm) 3 cm 11/19/24 1040   Wound Width (cm) 1 cm 11/19/24 1040   Wound Depth (cm) 0.6 cm 11/19/24 1040   Wound Volume (cm^3) 1.8 cm^3 11/19/24 1040   Wound Surface Area (cm^2) 3 cm^2 11/19/24 1040   Tunneling (depth (cm)/location) 0 11/19/24 1040   Undermining (depth  (cm)/location) 0 11/19/24 1040   Care Cleansed with:;Antimicrobial agent;Sterile normal saline;Wound cleanser 11/19/24 1040   Dressing Changed;Collagen;Calcium alginate;Absorptive Pad;Foam;Cast padding;Tubular bandage 11/19/24 1040   Periwound Care Moisture barrier applied;Absorptive dressing applied 11/19/24 1040   Compression Tubular elasticized bandage 11/19/24 1040   Off Loading Football dressing;Off loading shoe 11/19/24 1040   Dressing Change Due 11/22/24 11/19/24 1040            Wound 11/19/24 Blister(s) Left lateral Toe, fourth (Active)   11/19/24  Toe, fourth   Present on Original Admission: Y   Primary Wound Type: Blister(s)   Side: Left   Orientation: lateral   Wound Approximate Age at First Assessment (Weeks):    Wound Number:    Is this injury device related?:    Incision Type:    Closure Method:    Wound Description (Comments):    Type:    Additional Comments:    Ankle-Brachial Index:    Pulses:    Removal Indication and Assessment:    Wound Outcome:    Wound Image   11/19/24 1040   Dressing Appearance Intact;Dry 11/19/24 1040   Drainage Amount None 11/19/24 1040   Appearance Blistered;Maroon 11/19/24 1040   Tissue loss description Not applicable 11/19/24 1040   Wound Length (cm) 0.2 cm 11/19/24 1040   Wound Width (cm) 0.5 cm 11/19/24 1040   Wound Depth (cm) 0 cm 11/19/24 1040   Wound Volume (cm^3) 0 cm^3 11/19/24 1040   Wound Surface Area (cm^2) 0.1 cm^2 11/19/24 1040   Care Cleansed with:;Antimicrobial agent;Sterile normal saline 11/19/24 1040   Dressing Applied;Absorptive Pad;Cast padding 11/19/24 1040   Periwound Care Skin barrier film applied;Absorptive dressing applied 11/19/24 1040   Off Loading Football dressing;Off loading shoe 11/19/24 1040   Dressing Change Due 11/22/24 11/19/24 1040           Plan:     Wound Dressing Orders     Dressing change frequency two times a week and prn Nurse Visits   Remove old dressing   Cleanse or irrigate with: Normal Saline    Protect periwound with:  Gentian  "Patty     Left 4th Lateral Toe  Periwound: Iodine painted over blister  Primary dressing: WOUND BASE: Iodosorb to wound bed, U-Pad x 2 to protect area    Left Distal Foot Wound   Primary dressing: WOUND BASE: Endoform then Iodosorb to wound bed   Secondary dressing: Kaltostat     Left Proximal Foot Wound   Primary dressing: WOUND BASE: Retention Sutures in-place, Endoform to open areas of wound bed beneath sutures then Iodosorb, then Kaltostat   Secondary dressing: Mextra size 5" x 9" (laid vertically) then Abram Foam long x 3 to cushion   Compression/Off-load: Open Toe Football (cast padding x 2 rolls). Tubigrip, single layer, size D. CAM boot on the affected foot           Follow up in about 3 days (around 11/22/2024) for Nurse Visit.        "

## 2024-11-21 ENCOUNTER — PATIENT MESSAGE (OUTPATIENT)
Dept: PODIATRY | Facility: CLINIC | Age: 54
End: 2024-11-21
Payer: MEDICARE

## 2024-11-21 ENCOUNTER — TELEPHONE (OUTPATIENT)
Dept: PODIATRY | Facility: CLINIC | Age: 54
End: 2024-11-21
Payer: MEDICARE

## 2024-11-21 DIAGNOSIS — E11.610 TYPE 2 DIABETES MELLITUS WITH CHARCOT JOINT ARTHROPATHY: ICD-10-CM

## 2024-11-21 DIAGNOSIS — M86.9 OSTEOMYELITIS OF LEFT FOOT: ICD-10-CM

## 2024-11-21 DIAGNOSIS — M21.172 ACQUIRED VARUS DEFORMITY OF FOOT, LEFT: ICD-10-CM

## 2024-11-21 DIAGNOSIS — M86.372 CHRONIC MULTIFOCAL OSTEOMYELITIS OF LEFT FOOT: Primary | ICD-10-CM

## 2024-11-21 DIAGNOSIS — Z79.4 TYPE 2 DIABETES MELLITUS WITH HYPERGLYCEMIA, WITH LONG-TERM CURRENT USE OF INSULIN: ICD-10-CM

## 2024-11-21 DIAGNOSIS — E11.65 TYPE 2 DIABETES MELLITUS WITH HYPERGLYCEMIA, WITH LONG-TERM CURRENT USE OF INSULIN: ICD-10-CM

## 2024-11-21 DIAGNOSIS — M89.8X7 EXOSTOSIS OF BONE OF ANKLE: ICD-10-CM

## 2024-11-21 DIAGNOSIS — M24.572 EQUINUS CONTRACTURE OF LEFT ANKLE: ICD-10-CM

## 2024-11-21 DIAGNOSIS — S93.312S SUBLUXATION OF TARSAL JOINT OF FOOT, LEFT, SEQUELA: ICD-10-CM

## 2024-11-21 RX ORDER — SODIUM CHLORIDE 0.9 % (FLUSH) 0.9 %
10 SYRINGE (ML) INJECTION
Status: DISCONTINUED | OUTPATIENT
Start: 2024-11-21 | End: 2024-11-21 | Stop reason: HOSPADM

## 2024-11-21 NOTE — TELEPHONE ENCOUNTER
Please contact patient to see if he can come in tomorrow morning to review CT results. It shows a fluid collection and progression of the bone infection. I am going to recommend that we proceed with doing a surgery to stabilize the bone and begin treating the infected area appropriately.

## 2024-11-21 NOTE — TELEPHONE ENCOUNTER
Please contact patient to see if he can come in tomorrow morning to review CT results. It shows a fluid collection and progression of the bone infection. I am going to recommend that we proceed with doing a surgery to stabilize the bone and begin treating the infected area appropriately.     Called the patient and he will be here tomorrow 11/22/24 @ 9:30 to see Dr. Reyes to discuss his CT results.     Zaira

## 2024-11-22 ENCOUNTER — PATIENT MESSAGE (OUTPATIENT)
Dept: INTERNAL MEDICINE | Facility: CLINIC | Age: 54
End: 2024-11-22
Payer: MEDICARE

## 2024-11-22 ENCOUNTER — OFFICE VISIT (OUTPATIENT)
Dept: PODIATRY | Facility: CLINIC | Age: 54
End: 2024-11-22
Payer: MEDICARE

## 2024-11-22 VITALS — DIASTOLIC BLOOD PRESSURE: 77 MMHG | SYSTOLIC BLOOD PRESSURE: 125 MMHG | HEART RATE: 92 BPM

## 2024-11-22 DIAGNOSIS — M86.372 CHRONIC MULTIFOCAL OSTEOMYELITIS OF LEFT FOOT: Primary | ICD-10-CM

## 2024-11-22 DIAGNOSIS — E11.610 TYPE 2 DIABETES MELLITUS WITH CHARCOT JOINT ARTHROPATHY: ICD-10-CM

## 2024-11-22 DIAGNOSIS — L97.424 DIABETIC ULCER OF LEFT MIDFOOT ASSOCIATED WITH DIABETES MELLITUS DUE TO UNDERLYING CONDITION, WITH NECROSIS OF BONE: ICD-10-CM

## 2024-11-22 DIAGNOSIS — Z01.818 PREOP TESTING: ICD-10-CM

## 2024-11-22 DIAGNOSIS — S93.312S SUBLUXATION OF TARSAL JOINT OF FOOT, LEFT, SEQUELA: ICD-10-CM

## 2024-11-22 DIAGNOSIS — E08.621 DIABETIC ULCER OF LEFT MIDFOOT ASSOCIATED WITH DIABETES MELLITUS DUE TO UNDERLYING CONDITION, WITH NECROSIS OF BONE: ICD-10-CM

## 2024-11-22 PROCEDURE — 99999 PR PBB SHADOW E&M-EST. PATIENT-LVL III: CPT | Mod: PBBFAC,,, | Performed by: PODIATRIST

## 2024-11-22 NOTE — PROGRESS NOTES
Subjective:     Patient ID: Indio Ryder Jr. is a 54 y.o. male.    Chief Complaint: Diabetic Foot Ulcer, surgical consult    Indio is a 54 y.o. male who presents to the clinic for evaluation and treatment of high risk feet. 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), Colon adenocarcinoma (04/12/2022), Diabetic ketoacidosis without coma associated with type 2 diabetes mellitus (05/30/2017), Diabetic ulcer of right foot associated with type 2 diabetes mellitus (06/03/2015), Disorder of kidney and ureter, Essential hypertension (06/06/2013), Group B streptococcal infection (01/13/2017), Mixed hyperlipidemia (08/12/2014), Septic arthritis of left foot (04/28/2021), Shoulder impingement (08/12/2014), Subacute osteomyelitis of right foot (01/12/2017), Traumatic amputation of fifth toe of right foot (07/02/2015), and Type 2 diabetes mellitus with diabetic neuropathy, with long-term current use of insulin (05/03/2016). The patient's chief complaint is foot ulcer along the left plantar midfoot.  Patient had been seeing Dr. Peterson in the [facility] on a biweekly basis for management of his left foot ulceration. He presents today for surgical consultation, evaluation for rearfoot fusion in regards to his left foot and ankle.  His last A1c was obtained in April, greater than 14.0.  Patient has had extensive pedal surgical history, last having had a debridement per Dr. Peterson in July of 2023. Patient had received a custom ankle brace in January and states that he subsequently had developed an ulceration as a result of it; previous to this, the ulceration of his left midfoot had healed over.     09/16/2024:  Follow-up from updated weight-bearing left foot and ankle x-ray imaging and lab work.  Seen per  in wound clinic on weekly basis.  Ambulating with cam boot.  States he is in the process of adjusting his medications due to uncontrolled hyperglycemia.    11/11/2024:  Returns for  re-evaluation of progressive varus deformity to left foot and chronic ulceration followed weekly at Wound Care.  States his hemoglobin A1c improved and he is monitoring his continuous glucose monitor noting that his daily blood sugar values improved a lot since the last A1c was taken on 10/16/2024 had shown value of 11%.  Denies any pain.  Ambulating with tall Cam boot.  States he change his diet.    11/22/2024:  Returns to discuss CT results.  Seen weekly in Wound Care.    PCP: Lorena Thakur MD      Current shoe gear:  Affected Foot: Surgical boot, rolling knee scooter     Unaffected Foot: Extra depth shoes    Hemoglobin A1C   Date Value Ref Range Status   10/16/2024 11.0 (H) 4.0 - 5.6 % Final     Comment:     ADA Screening Guidelines:  5.7-6.4%  Consistent with prediabetes  >or=6.5%  Consistent with diabetes    High levels of fetal hemoglobin interfere with the HbA1C  assay. Heterozygous hemoglobin variants (HbS, HgC, etc)do  not significantly interfere with this assay.   However, presence of multiple variants may affect accuracy.     08/08/2024 >14.0 (H) 4.0 - 5.6 % Final     Comment:     ADA Screening Guidelines:  5.7-6.4%  Consistent with prediabetes  >or=6.5%  Consistent with diabetes    High levels of fetal hemoglobin interfere with the HbA1C  assay. Heterozygous hemoglobin variants (HbS, HgC, etc)do  not significantly interfere with this assay.   However, presence of multiple variants may affect accuracy.     04/20/2024 >14.0 (H) 4.0 - 5.6 % Final     Comment:     ADA Screening Guidelines:  5.7-6.4%  Consistent with prediabetes  >or=6.5%  Consistent with diabetes    High levels of fetal hemoglobin interfere with the HbA1C  assay. Heterozygous hemoglobin variants (HbS, HgC, etc)do  not significantly interfere with this assay.   However, presence of multiple variants may affect accuracy.         Review of Systems   Constitutional: Negative for chills and fever.   HENT:  Negative for congestion.    Eyes:  Negative.    Cardiovascular:  Negative for chest pain.   Respiratory:  Negative for cough and shortness of breath.    Skin:  Positive for dry skin.   Neurological:  Positive for numbness.   Psychiatric/Behavioral:  Negative for altered mental status.         Objective:     Physical Exam  Vitals reviewed.   Constitutional:       General: He is not in acute distress.     Appearance: He is not ill-appearing.   Cardiovascular:      Comments: Capillary refill greater than 3 seconds to left digits.     Pedal pulses heard only through Doppler bilateral:    Triphasic PT, monophasic DP left   Monophasic PT, monophasic DP right  Musculoskeletal:         General: No swelling or tenderness.      Right lower leg: No edema.      Left lower leg: No edema.      Comments: Amputated left 5th ray.  Moderate flexible varus rotation to the left foot noted.  Left ankle range motion difficult to assess but noted to be 0° dorsiflexion to neutral with the knee extended at the ankle.  No pain on palpation or range motion to the foot and ankle bilateral.  Crepitus with attempted left ankle range motion noted.   Feet:      Right foot:      Protective Sensation: 10 sites tested.  0 sites sensed.      Skin integrity: Ulcer present.      Left foot:      Protective Sensation: 10 sites tested.  0 sites sensed.   Skin:     General: Skin is warm and dry.      Findings: No ecchymosis or erythema.      Nails: There is no clubbing.      Comments: Referred to attached media picture for plantar lateral left midfoot ulceration which has intact sutures from previous debridement.   Neurological:      Mental Status: He is alert and oriented to person, place, and time.      Cranial Nerves: No cranial nerve deficit.      Sensory: Sensory deficit present.      Comments: 0/10 Shiloh-Nenita monofilament bilateral   Psychiatric:         Mood and Affect: Mood normal.                 Assessment:      Encounter Diagnoses   Name Primary?    Chronic multifocal  osteomyelitis of left foot Yes    Subluxation of tarsal joint of foot, left, sequela     Type 2 diabetes mellitus with Charcot joint arthropathy     Diabetic ulcer of left midfoot associated with diabetes mellitus due to underlying condition, with necrosis of bone     Preop testing      Plan:     Indio was seen today for wound check.    Diagnoses and all orders for this visit:    Chronic multifocal osteomyelitis of left foot  -     Ambulatory referral/consult to Internal Medicine; Future    Subluxation of tarsal joint of foot, left, sequela    Type 2 diabetes mellitus with Charcot joint arthropathy  -     Ambulatory referral/consult to Internal Medicine; Future    Diabetic ulcer of left midfoot associated with diabetes mellitus due to underlying condition, with necrosis of bone    Preop testing  -     Ambulatory referral/consult to Internal Medicine; Future      I counseled the patient on his conditions, their implications and medical management.    Reviewed follow-up left foot x-ray completed on 10/16/2024 which had shown significant arthrosis of the left ankle with prior large osteochondral fragment involving the talar dome and moderate osteophytic lipping along the distal anterior tibia and posterior ankle joint.  There is complete 4th and 5th ray resection with varus rotation of the left foot.  Arthritic changes involving the NC joint of the left foot.    CT scan of the left hindfoot and ankle completed 11/19/2024 was read as extensive erosive changes in the hindfoot and midfoot most pronounced at the anterior calcaneus and cuboid concerning for osteomyelitis.  Suspect pathologic fracture of the anterior calcaneus and cuboid.  3 cm peripherally enhancing fluid collection in the lateral midfoot concerning for abscess.  We discussed the findings in detail in the need for more aggressive irrigation debridement with repeat deep bone biopsy.  Plan will be to implant bio active glass most likely with surrounding  Stimulan which is antibiotic eluting cement pallets to help treat any deep infection in the area.  Patient will also require partial reduction of the foot with percutaneous pinning.  Plan for Achilles tendon lengthening of the left lower extremity in addition to exostectomy of the distal anterior tibia/ankle region to help improve the equinus contracture and potential preparation of the subtalar joint for arthrodesis to help stabilize the hindfoot followed by application of multiplane external fixation to left lower extremity to further stabilize the bone and offload the plantar wound site.  Risks, benefits anticipate postoperative course were discussed in great detail.  No guarantees were given or implied.  Patient agreed to proceed with surgical intervention outlined above.  This will be part of a staged intervention requiring at least 1 further surgery to remove the external fixation.  He may at some point require ankle arthrodesis to further stabilize.  He will also require release of the posterior tibial tendon and the peroneus longus tendon intra op to help reposition and balance the foot.  May require septic triple arthrodesis with percutaneous pinning per above to help stabilize align the foot.    This procedure has been fully reviewed with the patient and written informed consent has been obtained.    Referred to PCP for medical optimization and risk stratification    Patient recently admitted a proximally 5 weeks ago in his receiving Cipro 750 mg p.o. b.i.d. per Infectious Disease with antibiotic stop date of 11/29/2024.  I will also repeat deep bone biopsy to determine if there is still any residual growth in addition to sending bone for pathology.    Patient will be admitted postop and followed by infectious disease.  Also consider possible facility placement.    The previous sutures removed from the plantar left foot.  Applied Hydrofera ready blue to the wound site followed by a surrounding felt  aperture pad secured with cast padding and Coban forming football dressing.  Tall cam boot reapplied.  Patient to follow up in Wound Clinic.    RTC 1 week postop or p.r.n. as discussed.    A portion of this note was generated by voice recognition software and may contain spelling and grammar errors.

## 2024-11-22 NOTE — H&P (VIEW-ONLY)
Subjective:     Patient ID: Indio Ryder Jr. is a 54 y.o. male.    Chief Complaint: Diabetic Foot Ulcer, surgical consult    Indio is a 54 y.o. male who presents to the clinic for evaluation and treatment of high risk feet. 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), Colon adenocarcinoma (04/12/2022), Diabetic ketoacidosis without coma associated with type 2 diabetes mellitus (05/30/2017), Diabetic ulcer of right foot associated with type 2 diabetes mellitus (06/03/2015), Disorder of kidney and ureter, Essential hypertension (06/06/2013), Group B streptococcal infection (01/13/2017), Mixed hyperlipidemia (08/12/2014), Septic arthritis of left foot (04/28/2021), Shoulder impingement (08/12/2014), Subacute osteomyelitis of right foot (01/12/2017), Traumatic amputation of fifth toe of right foot (07/02/2015), and Type 2 diabetes mellitus with diabetic neuropathy, with long-term current use of insulin (05/03/2016). The patient's chief complaint is foot ulcer along the left plantar midfoot.  Patient had been seeing Dr. Peterson in the [facility] on a biweekly basis for management of his left foot ulceration. He presents today for surgical consultation, evaluation for rearfoot fusion in regards to his left foot and ankle.  His last A1c was obtained in April, greater than 14.0.  Patient has had extensive pedal surgical history, last having had a debridement per Dr. Peterson in July of 2023. Patient had received a custom ankle brace in January and states that he subsequently had developed an ulceration as a result of it; previous to this, the ulceration of his left midfoot had healed over.     09/16/2024:  Follow-up from updated weight-bearing left foot and ankle x-ray imaging and lab work.  Seen per  in wound clinic on weekly basis.  Ambulating with cam boot.  States he is in the process of adjusting his medications due to uncontrolled hyperglycemia.    11/11/2024:  Returns for  re-evaluation of progressive varus deformity to left foot and chronic ulceration followed weekly at Wound Care.  States his hemoglobin A1c improved and he is monitoring his continuous glucose monitor noting that his daily blood sugar values improved a lot since the last A1c was taken on 10/16/2024 had shown value of 11%.  Denies any pain.  Ambulating with tall Cam boot.  States he change his diet.    11/22/2024:  Returns to discuss CT results.  Seen weekly in Wound Care.    PCP: Lorena Thakur MD      Current shoe gear:  Affected Foot: Surgical boot, rolling knee scooter     Unaffected Foot: Extra depth shoes    Hemoglobin A1C   Date Value Ref Range Status   10/16/2024 11.0 (H) 4.0 - 5.6 % Final     Comment:     ADA Screening Guidelines:  5.7-6.4%  Consistent with prediabetes  >or=6.5%  Consistent with diabetes    High levels of fetal hemoglobin interfere with the HbA1C  assay. Heterozygous hemoglobin variants (HbS, HgC, etc)do  not significantly interfere with this assay.   However, presence of multiple variants may affect accuracy.     08/08/2024 >14.0 (H) 4.0 - 5.6 % Final     Comment:     ADA Screening Guidelines:  5.7-6.4%  Consistent with prediabetes  >or=6.5%  Consistent with diabetes    High levels of fetal hemoglobin interfere with the HbA1C  assay. Heterozygous hemoglobin variants (HbS, HgC, etc)do  not significantly interfere with this assay.   However, presence of multiple variants may affect accuracy.     04/20/2024 >14.0 (H) 4.0 - 5.6 % Final     Comment:     ADA Screening Guidelines:  5.7-6.4%  Consistent with prediabetes  >or=6.5%  Consistent with diabetes    High levels of fetal hemoglobin interfere with the HbA1C  assay. Heterozygous hemoglobin variants (HbS, HgC, etc)do  not significantly interfere with this assay.   However, presence of multiple variants may affect accuracy.         Review of Systems   Constitutional: Negative for chills and fever.   HENT:  Negative for congestion.    Eyes:  Negative.    Cardiovascular:  Negative for chest pain.   Respiratory:  Negative for cough and shortness of breath.    Skin:  Positive for dry skin.   Neurological:  Positive for numbness.   Psychiatric/Behavioral:  Negative for altered mental status.         Objective:     Physical Exam  Vitals reviewed.   Constitutional:       General: He is not in acute distress.     Appearance: He is not ill-appearing.   Cardiovascular:      Comments: Capillary refill greater than 3 seconds to left digits.     Pedal pulses heard only through Doppler bilateral:    Triphasic PT, monophasic DP left   Monophasic PT, monophasic DP right  Musculoskeletal:         General: No swelling or tenderness.      Right lower leg: No edema.      Left lower leg: No edema.      Comments: Amputated left 5th ray.  Moderate flexible varus rotation to the left foot noted.  Left ankle range motion difficult to assess but noted to be 0° dorsiflexion to neutral with the knee extended at the ankle.  No pain on palpation or range motion to the foot and ankle bilateral.  Crepitus with attempted left ankle range motion noted.   Feet:      Right foot:      Protective Sensation: 10 sites tested.  0 sites sensed.      Skin integrity: Ulcer present.      Left foot:      Protective Sensation: 10 sites tested.  0 sites sensed.   Skin:     General: Skin is warm and dry.      Findings: No ecchymosis or erythema.      Nails: There is no clubbing.      Comments: Referred to attached media picture for plantar lateral left midfoot ulceration which has intact sutures from previous debridement.   Neurological:      Mental Status: He is alert and oriented to person, place, and time.      Cranial Nerves: No cranial nerve deficit.      Sensory: Sensory deficit present.      Comments: 0/10 [place] monofilament bilateral   Psychiatric:         Mood and Affect: Mood normal.                 Assessment:      Encounter Diagnoses   Name Primary?    Chronic multifocal  osteomyelitis of left foot Yes    Subluxation of tarsal joint of foot, left, sequela     Type 2 diabetes mellitus with Charcot joint arthropathy     Diabetic ulcer of left midfoot associated with diabetes mellitus due to underlying condition, with necrosis of bone     Preop testing      Plan:     Indio was seen today for wound check.    Diagnoses and all orders for this visit:    Chronic multifocal osteomyelitis of left foot  -     Ambulatory referral/consult to Internal Medicine; Future    Subluxation of tarsal joint of foot, left, sequela    Type 2 diabetes mellitus with Charcot joint arthropathy  -     Ambulatory referral/consult to Internal Medicine; Future    Diabetic ulcer of left midfoot associated with diabetes mellitus due to underlying condition, with necrosis of bone    Preop testing  -     Ambulatory referral/consult to Internal Medicine; Future      I counseled the patient on his conditions, their implications and medical management.    Reviewed follow-up left foot x-ray completed on 10/16/2024 which had shown significant arthrosis of the left ankle with prior large osteochondral fragment involving the talar dome and moderate osteophytic lipping along the distal anterior tibia and posterior ankle joint.  There is complete 4th and 5th ray resection with varus rotation of the left foot.  Arthritic changes involving the NC joint of the left foot.    CT scan of the left hindfoot and ankle completed 11/19/2024 was read as extensive erosive changes in the hindfoot and midfoot most pronounced at the anterior calcaneus and cuboid concerning for osteomyelitis.  Suspect pathologic fracture of the anterior calcaneus and cuboid.  3 cm peripherally enhancing fluid collection in the lateral midfoot concerning for abscess.  We discussed the findings in detail in the need for more aggressive irrigation debridement with repeat deep bone biopsy.  Plan will be to implant bio active glass most likely with surrounding  Stimulan which is antibiotic eluting cement pallets to help treat any deep infection in the area.  Patient will also require partial reduction of the foot with percutaneous pinning.  Plan for Achilles tendon lengthening of the left lower extremity in addition to exostectomy of the distal anterior tibia/ankle region to help improve the equinus contracture and potential preparation of the subtalar joint for arthrodesis to help stabilize the hindfoot followed by application of multiplane external fixation to left lower extremity to further stabilize the bone and offload the plantar wound site.  Risks, benefits anticipate postoperative course were discussed in great detail.  No guarantees were given or implied.  Patient agreed to proceed with surgical intervention outlined above.  This will be part of a staged intervention requiring at least 1 further surgery to remove the external fixation.  He may at some point require ankle arthrodesis to further stabilize.  He will also require release of the posterior tibial tendon and the peroneus longus tendon intra op to help reposition and balance the foot.  May require septic triple arthrodesis with percutaneous pinning per above to help stabilize align the foot.    This procedure has been fully reviewed with the patient and written informed consent has been obtained.    Referred to PCP for medical optimization and risk stratification    Patient recently admitted a proximally 5 weeks ago in his receiving Cipro 750 mg p.o. b.i.d. per Infectious Disease with antibiotic stop date of 11/29/2024.  I will also repeat deep bone biopsy to determine if there is still any residual growth in addition to sending bone for pathology.    Patient will be admitted postop and followed by infectious disease.  Also consider possible facility placement.    The previous sutures removed from the plantar left foot.  Applied Hydrofera ready blue to the wound site followed by a surrounding felt  aperture pad secured with cast padding and Coban forming football dressing.  Tall cam boot reapplied.  Patient to follow up in Wound Clinic.    RTC 1 week postop or p.r.n. as discussed.    A portion of this note was generated by voice recognition software and may contain spelling and grammar errors.

## 2024-11-25 ENCOUNTER — OFFICE VISIT (OUTPATIENT)
Dept: INTERNAL MEDICINE | Facility: CLINIC | Age: 54
End: 2024-11-25
Payer: MEDICARE

## 2024-11-25 ENCOUNTER — HOSPITAL ENCOUNTER (OUTPATIENT)
Dept: RADIOLOGY | Facility: HOSPITAL | Age: 54
Discharge: HOME | End: 2024-11-25
Attending: INTERNAL MEDICINE
Payer: MEDICARE

## 2024-11-25 VITALS
BODY MASS INDEX: 25.94 KG/M2 | HEIGHT: 73 IN | SYSTOLIC BLOOD PRESSURE: 92 MMHG | HEART RATE: 95 BPM | DIASTOLIC BLOOD PRESSURE: 62 MMHG | OXYGEN SATURATION: 97 %

## 2024-11-25 DIAGNOSIS — Z01.818 PREOP EXAMINATION: Primary | ICD-10-CM

## 2024-11-25 DIAGNOSIS — E11.65 TYPE 2 DIABETES MELLITUS WITH HYPERGLYCEMIA, WITH LONG-TERM CURRENT USE OF INSULIN: ICD-10-CM

## 2024-11-25 DIAGNOSIS — R94.31 ABNORMAL EKG: ICD-10-CM

## 2024-11-25 DIAGNOSIS — M86.372 CHRONIC MULTIFOCAL OSTEOMYELITIS OF LEFT FOOT: ICD-10-CM

## 2024-11-25 DIAGNOSIS — Z01.818 PREOP TESTING: ICD-10-CM

## 2024-11-25 DIAGNOSIS — M86.272 SUBACUTE OSTEOMYELITIS OF LEFT FOOT: ICD-10-CM

## 2024-11-25 DIAGNOSIS — E11.610 TYPE 2 DIABETES MELLITUS WITH CHARCOT JOINT ARTHROPATHY: ICD-10-CM

## 2024-11-25 DIAGNOSIS — Z79.4 TYPE 2 DIABETES MELLITUS WITH HYPERGLYCEMIA, WITH LONG-TERM CURRENT USE OF INSULIN: ICD-10-CM

## 2024-11-25 DIAGNOSIS — Z01.818 PREOP EXAMINATION: ICD-10-CM

## 2024-11-25 LAB
GLUCOSE SERPL-MCNC: 100 MG/DL (ref 70–110)
OHS QRS DURATION: 94 MS
OHS QTC CALCULATION: 497 MS

## 2024-11-25 PROCEDURE — 71046 X-RAY EXAM CHEST 2 VIEWS: CPT | Mod: TC

## 2024-11-25 PROCEDURE — 81001 URINALYSIS AUTO W/SCOPE: CPT | Performed by: INTERNAL MEDICINE

## 2024-11-25 PROCEDURE — 93010 ELECTROCARDIOGRAM REPORT: CPT | Mod: S$GLB,,, | Performed by: STUDENT IN AN ORGANIZED HEALTH CARE EDUCATION/TRAINING PROGRAM

## 2024-11-25 PROCEDURE — 71046 X-RAY EXAM CHEST 2 VIEWS: CPT | Mod: 26,,, | Performed by: RADIOLOGY

## 2024-11-25 NOTE — PROGRESS NOTES
Subjective:       Patient ID: Indio Ryder Jr. is a 54 y.o. male.    Chief Complaint: Pre-op Exam      HPI  Indio Ryder Jr. is a 54 y.o. year old male with t2DM, LLE diabetic wound with osteomyelitis, recent DKA 2'/2 bacteremia with AMS leading to ICU admission. Inflammatory markers improving with antibiotics. Recent CT scan showing signs of worsening osteo. Planned for debridement and fixation with podiatry (Dr. Reyes). Planning for 3-4 night overnight stay after procedure with possible placement afterwards.     Patient utilizing knee scooter today in clinic. Denies any chest pain, shortness of breath, palpitations, headaches, dizziness. States his blood pressures have been running low normal. Has been watching intake. States his blood sugars 70-90s at home in mornings. Blood glucose 100 in clinic today.     Taking 26 units toujeo, novolog correction, jardiance 25,.     Review of Systems   Constitutional:  Negative for activity change, appetite change, fatigue and fever.   Respiratory:  Negative for chest tightness and shortness of breath.    Cardiovascular:  Negative for chest pain and palpitations.   Gastrointestinal:  Negative for abdominal pain, constipation, diarrhea and nausea.   Musculoskeletal:  Positive for arthralgias and gait problem. Negative for myalgias.   Neurological:  Negative for weakness and light-headedness.         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    Right foot    Colon adenocarcinoma 04/12/2022    Diabetic ketoacidosis without coma associated with type 2 diabetes mellitus 05/30/2017    Diabetic ulcer of right foot associated with type 2 diabetes mellitus 06/03/2015    Disorder of kidney and ureter     Essential hypertension 06/06/2013    Group B streptococcal infection 01/13/2017    Mixed hyperlipidemia 08/12/2014    Septic arthritis of left foot 04/28/2021    Shoulder impingement 08/12/2014    Subacute osteomyelitis of right foot  "01/12/2017    Streptococcus agalactiae, Actinomyces odontolyticus    Traumatic amputation of fifth toe of right foot 07/02/2015    Type 2 diabetes mellitus with diabetic neuropathy, with long-term current use of insulin 05/03/2016        Prior to Admission medications    Medication Sig Start Date End Date Taking? Authorizing Provider   blood sugar diagnostic Strp use to test blood sugar 4 times daily 10/22/24   Roxi Pack MD   blood-glucose meter Misc Use as instructed 10/22/24   Roxi Pack MD   ciprofloxacin HCl (CIPRO) 750 MG tablet Take 1 tablet (750 mg total) by mouth every 12 (twelve) hours. 10/22/24 12/3/24  Roxi Pack MD   empagliflozin (JARDIANCE) 25 mg tablet Take 1 tablet (25 mg total) by mouth once daily. 11/21/24   Gigi Clay, APRN, FNP   insulin aspart U-100 (NOVOLOG) 100 unit/mL (3 mL) InPn pen Inject if sugar is > 180 up to 4x a day , 180-230+2, 231-280+4, 281-330+6, 331-380+8, >380+10. Max daily 40 units 10/22/24   Roxi Pack MD   insulin glargine U-300 conc (TOUJEO MAX U-300 SOLOSTAR) 300 unit/mL (3 mL) insulin pen Inject 26 to 32 units under the skin at night 10/22/24   Roxi Pack MD   lancets 33 gauge Misc 1 lancet  by Misc.(Non-Drug; Combo Route) route 4 (four) times daily. 10/22/24   Roxi Pack MD   pantoprazole (PROTONIX) 40 MG tablet Take 1 tablet (40 mg total) by mouth once daily. 10/22/24 10/22/25  Roxi Pack MD   pen needle, diabetic (BD SASCHA 2ND GEN PEN NEEDLE) 32 gauge x 5/32" Ndle Use 4 times a day 10/22/24   Roxi Pack MD        Past medical history, surgical history, and family medical history reviewed and updated as appropriate.    Medications and allergies reviewed.     Objective:          Vitals:    11/25/24 1319 11/25/24 1339   BP: (!) 86/68 92/62   BP Location: Right arm Left arm   Patient Position: Sitting Sitting   Pulse: 105 95   SpO2: 97%    Height: 6' 1" (1.854 m)      Body mass index is 25.94 " kg/m².  Physical Exam  Constitutional:       General: He is not in acute distress.     Appearance: He is obese. He is not ill-appearing, toxic-appearing or diaphoretic.      Comments: Knee scooter  Wound dressed   HENT:      Head: Normocephalic and atraumatic.      Nose: No congestion or rhinorrhea.      Mouth/Throat:      Mouth: Mucous membranes are moist.      Pharynx: Oropharynx is clear.   Eyes:      Extraocular Movements: Extraocular movements intact.   Cardiovascular:      Rate and Rhythm: Regular rhythm. Tachycardia present.      Heart sounds: No murmur heard.     No friction rub. No gallop.   Pulmonary:      Effort: Pulmonary effort is normal. No respiratory distress.   Abdominal:      General: Bowel sounds are normal. There is no distension.      Tenderness: There is no abdominal tenderness.   Musculoskeletal:         General: Deformity and signs of injury present.      Cervical back: Normal range of motion. No rigidity.   Lymphadenopathy:      Cervical: No cervical adenopathy.   Skin:     General: Skin is warm and dry.      Capillary Refill: Capillary refill takes 2 to 3 seconds.   Neurological:      General: No focal deficit present.      Mental Status: He is alert and oriented to person, place, and time.   Psychiatric:         Mood and Affect: Mood normal.         Thought Content: Thought content normal.         Lab Results   Component Value Date    WBC 7.08 11/14/2024    HGB 11.0 (L) 11/14/2024    HCT 34.6 (L) 11/14/2024     (H) 11/14/2024    CHOL 181 01/13/2023    TRIG 92 01/13/2023    HDL 49 01/13/2023    ALT 9 (L) 11/14/2024    AST 15 11/14/2024     11/14/2024    K 3.9 11/14/2024     11/14/2024    CREATININE 1.2 11/14/2024    BUN 19 11/14/2024    CO2 25 11/14/2024    TSH 1.136 03/01/2012    PSA 0.65 08/11/2014    INR 1.0 10/16/2024    HGBA1C 11.0 (H) 10/16/2024         Assessment:       1. Preop examination    2. Abnormal EKG    3. Subacute osteomyelitis of left foot    4. Type 2  diabetes mellitus with hyperglycemia, with long-term current use of insulin          Plan:     Indio was seen today for pre-op exam.    Diagnoses and all orders for this visit:    Preop examination  Comments:  hypotensive, tachycardic, asymptomatic; last a1c 11.0. No chest pain / anginal equivalents. Needs stress testing eventually. Procedure is urgent.  Orders:  -     IN OFFICE EKG 12-LEAD (to Muse)  -     CBC W/ AUTO DIFFERENTIAL; Future  -     COMPREHENSIVE METABOLIC PANEL; Future  -     Urinalysis  -     X-Ray Chest PA And Lateral; Future  -     Nuclear Stress - Cardiology Interpreted; Future    Abnormal EKG  -     Nuclear Stress - Cardiology Interpreted; Future    Subacute osteomyelitis of left foot    Type 2 diabetes mellitus with hyperglycemia, with long-term current use of insulin  Comments:  last A1c 11.0  Orders:  -     POCT Glucose, Hand-Held Device      The patient was evaluated in accordance with the 2014 ACC/AHA guidelines for cici-operative assessment.    1. This is not considered an emergent procedure.   2. There is no evidence of acute coronary syndrome or other unstable cardiovascular process that necessitates expedited evaluation and treatment.   3. The estimated per-operative risk of a major cardiovascular adverse event based on the combined surgical/patient risk is >1% (increased risk).    4. As the patient is able to complete <4 mets, non-invasive risk stratification is recommended to guide further management.     Revised Cardiac Risk Index for Pre-Operative Risk - 1 point, 6.0 % risk of major cardiac event  AUB-HAS2 - 0 points 0.3 % Risk of adverse event in 30-day postoperative period    Previous EKG with TWI in inferolateral leads.   Last stress testing done 2/2022, within the last 3 years.     Hospitalization 1 month ago for DKA / AMS / bacteremia with pseudomonas and klebsiella.   Since starting antibiotics, blood glucoses have become more controlled.     Blood glucose average 60-80s per  patient.     Currently taking Toujeo 26 units nightly. Novolog only correction scale.   Taking Jardiance 25 mg daily.   On ciprofloxacin 750 mg q12 hours.     Patient is a moderate cardiovascular risk for moderate risk procedure.   Reports he has better control of his diabetes since discharge.   Recommend he proceed with irrigation and debridement for source control as this proceed is elective, but urgent.     EKG done today with NSR, nonspecific ST-T wave abnormalities. TWI in inferolateral leads less pronounced. (Independently interpreted).   CXR with no acute cardiopulmonary process.  Awaiting labs, urine    Would benefit from repeat stress testing in future.     Health maintenance reviewed with patient.     Follow up if symptoms worsen or fail to improve.    Yariel Troncoso MD  Internal Medicine / Primary Care  [facility]  11/25/2024

## 2024-11-26 ENCOUNTER — ANESTHESIA EVENT (OUTPATIENT)
Dept: SURGERY | Facility: HOSPITAL | Age: 54
End: 2024-11-26
Payer: MEDICARE

## 2024-11-26 ENCOUNTER — PATIENT MESSAGE (OUTPATIENT)
Dept: ADMINISTRATIVE | Facility: HOSPITAL | Age: 54
End: 2024-11-26
Payer: MEDICARE

## 2024-11-26 LAB
BACTERIA #/AREA URNS AUTO: NORMAL /HPF
BILIRUB UR QL STRIP: NEGATIVE
CLARITY UR REFRACT.AUTO: CLEAR
COLOR UR AUTO: YELLOW
GLUCOSE UR QL STRIP: ABNORMAL
HGB UR QL STRIP: NEGATIVE
KETONES UR QL STRIP: NEGATIVE
LEUKOCYTE ESTERASE UR QL STRIP: NEGATIVE
MICROSCOPIC COMMENT: NORMAL
NITRITE UR QL STRIP: NEGATIVE
PH UR STRIP: 6 [PH] (ref 5–8)
PROT UR QL STRIP: NEGATIVE
SP GR UR STRIP: 1.03 (ref 1–1.03)
URN SPEC COLLECT METH UR: ABNORMAL
YEAST UR QL AUTO: NORMAL

## 2024-11-26 NOTE — PHYSICIAN QUERY
Please specify the Type of debridement performed on the Left Foot:  Excisional debridement of bone

## 2024-11-27 ENCOUNTER — HOSPITAL ENCOUNTER (INPATIENT)
Facility: HOSPITAL | Age: 54
LOS: 6 days | Discharge: HOME HEALTH | DRG: 629 | End: 2024-12-03
Attending: PODIATRIST | Admitting: PODIATRIST
Payer: MEDICARE

## 2024-11-27 ENCOUNTER — ANESTHESIA (OUTPATIENT)
Dept: SURGERY | Facility: HOSPITAL | Age: 54
End: 2024-11-27
Payer: MEDICARE

## 2024-11-27 DIAGNOSIS — E11.65 TYPE 2 DIABETES MELLITUS WITH HYPERGLYCEMIA, WITH LONG-TERM CURRENT USE OF INSULIN: ICD-10-CM

## 2024-11-27 DIAGNOSIS — Z79.4 TYPE 2 DIABETES MELLITUS WITH HYPERGLYCEMIA, WITH LONG-TERM CURRENT USE OF INSULIN: ICD-10-CM

## 2024-11-27 DIAGNOSIS — M86.9 OSTEOMYELITIS OF LEFT FOOT: ICD-10-CM

## 2024-11-27 DIAGNOSIS — S93.312S SUBLUXATION OF TARSAL JOINT OF FOOT, LEFT, SEQUELA: Primary | ICD-10-CM

## 2024-11-27 DIAGNOSIS — M86.672 CHRONIC OSTEOMYELITIS OF LEFT FOOT: ICD-10-CM

## 2024-11-27 DIAGNOSIS — M24.572 EQUINUS CONTRACTURE OF LEFT ANKLE: ICD-10-CM

## 2024-11-27 DIAGNOSIS — Z98.890 POSTOPERATIVE STATE: ICD-10-CM

## 2024-11-27 DIAGNOSIS — M21.172 ACQUIRED VARUS DEFORMITY OF FOOT, LEFT: ICD-10-CM

## 2024-11-27 PROBLEM — M89.8X7 EXOSTOSIS OF BONE OF ANKLE: Status: ACTIVE | Noted: 2024-11-27

## 2024-11-27 PROBLEM — E11.610: Status: ACTIVE | Noted: 2024-11-27

## 2024-11-27 LAB
GRAM STN SPEC: NORMAL
GRAM STN SPEC: NORMAL
POCT GLUCOSE: 113 MG/DL (ref 70–110)
POCT GLUCOSE: 128 MG/DL (ref 70–110)
POCT GLUCOSE: 196 MG/DL (ref 70–110)

## 2024-11-27 PROCEDURE — 87075 CULTR BACTERIA EXCEPT BLOOD: CPT | Mod: HCNC | Performed by: PODIATRIST

## 2024-11-27 PROCEDURE — 63600175 PHARM REV CODE 636 W HCPCS: Mod: HCNC | Performed by: PODIATRIST

## 2024-11-27 PROCEDURE — 37000008 HC ANESTHESIA 1ST 15 MINUTES: Mod: HCNC | Performed by: PODIATRIST

## 2024-11-27 PROCEDURE — 0QTM0ZZ RESECTION OF LEFT TARSAL, OPEN APPROACH: ICD-10-PCS | Performed by: PODIATRIST

## 2024-11-27 PROCEDURE — 0QHM35Z INSERTION OF EXTERNAL FIXATION DEVICE INTO LEFT TARSAL, PERCUTANEOUS APPROACH: ICD-10-PCS | Performed by: PODIATRIST

## 2024-11-27 PROCEDURE — 87116 MYCOBACTERIA CULTURE: CPT | Mod: HCNC | Performed by: PODIATRIST

## 2024-11-27 PROCEDURE — 27201423 OPTIME MED/SURG SUP & DEVICES STERILE SUPPLY: Mod: HCNC | Performed by: PODIATRIST

## 2024-11-27 PROCEDURE — 64445 NJX AA&/STRD SCIATIC NRV IMG: CPT | Mod: HCNC

## 2024-11-27 PROCEDURE — 37000009 HC ANESTHESIA EA ADD 15 MINS: Mod: HCNC | Performed by: PODIATRIST

## 2024-11-27 PROCEDURE — 63600175 PHARM REV CODE 636 W HCPCS: Mod: HCNC

## 2024-11-27 PROCEDURE — 0QBH0ZZ EXCISION OF LEFT TIBIA, OPEN APPROACH: ICD-10-PCS | Performed by: PODIATRIST

## 2024-11-27 PROCEDURE — C1769 GUIDE WIRE: HCPCS | Mod: HCNC | Performed by: PODIATRIST

## 2024-11-27 PROCEDURE — 87205 SMEAR GRAM STAIN: CPT | Mod: HCNC | Performed by: PODIATRIST

## 2024-11-27 PROCEDURE — 88307 TISSUE EXAM BY PATHOLOGIST: CPT | Mod: HCNC | Performed by: STUDENT IN AN ORGANIZED HEALTH CARE EDUCATION/TRAINING PROGRAM

## 2024-11-27 PROCEDURE — 25000003 PHARM REV CODE 250: Mod: HCNC

## 2024-11-27 PROCEDURE — 0YHN0YZ INSERTION OF OTHER DEVICE INTO LEFT FOOT, OPEN APPROACH: ICD-10-PCS | Performed by: PODIATRIST

## 2024-11-27 PROCEDURE — C1713 ANCHOR/SCREW BN/BN,TIS/BN: HCPCS | Mod: HCNC | Performed by: PODIATRIST

## 2024-11-27 PROCEDURE — 0L8T0ZZ DIVISION OF LEFT ANKLE TENDON, OPEN APPROACH: ICD-10-PCS | Performed by: PODIATRIST

## 2024-11-27 PROCEDURE — 11000001 HC ACUTE MED/SURG PRIVATE ROOM: Mod: HCNC

## 2024-11-27 PROCEDURE — 20700 MNL PREP&INSJ DP RX DLVR DEV: CPT | Mod: HCNC,,, | Performed by: PODIATRIST

## 2024-11-27 PROCEDURE — 71000039 HC RECOVERY, EACH ADD'L HOUR: Mod: HCNC | Performed by: PODIATRIST

## 2024-11-27 PROCEDURE — 27640 PARTIAL REMOVAL OF TIBIA: CPT | Mod: HCNC,LT,, | Performed by: PODIATRIST

## 2024-11-27 PROCEDURE — 71000033 HC RECOVERY, INTIAL HOUR: Mod: HCNC | Performed by: PODIATRIST

## 2024-11-27 PROCEDURE — 63600175 PHARM REV CODE 636 W HCPCS: Mod: JZ,JG,HCNC | Performed by: ANESTHESIOLOGY

## 2024-11-27 PROCEDURE — 20240 BONE BIOPSY OPEN SUPERFICIAL: CPT | Mod: 51,HCNC,, | Performed by: PODIATRIST

## 2024-11-27 PROCEDURE — 94761 N-INVAS EAR/PLS OXIMETRY MLT: CPT | Mod: HCNC

## 2024-11-27 PROCEDURE — 87102 FUNGUS ISOLATION CULTURE: CPT | Mod: HCNC | Performed by: PODIATRIST

## 2024-11-27 PROCEDURE — 27685 REVISION OF LOWER LEG TENDON: CPT | Mod: 51,HCNC,LT, | Performed by: PODIATRIST

## 2024-11-27 PROCEDURE — 63600175 PHARM REV CODE 636 W HCPCS: Mod: JZ,JG,HCNC

## 2024-11-27 PROCEDURE — 87206 SMEAR FLUORESCENT/ACID STAI: CPT | Mod: HCNC | Performed by: PODIATRIST

## 2024-11-27 PROCEDURE — 88311 DECALCIFY TISSUE: CPT | Mod: HCNC | Performed by: STUDENT IN AN ORGANIZED HEALTH CARE EDUCATION/TRAINING PROGRAM

## 2024-11-27 PROCEDURE — 36000709 HC OR TIME LEV III EA ADD 15 MIN: Mod: HCNC | Performed by: PODIATRIST

## 2024-11-27 PROCEDURE — P9045 ALBUMIN (HUMAN), 5%, 250 ML: HCPCS | Mod: JZ,JG,HCNC

## 2024-11-27 PROCEDURE — 87070 CULTURE OTHR SPECIMN AEROBIC: CPT | Mod: HCNC | Performed by: PODIATRIST

## 2024-11-27 PROCEDURE — 0SGJ04Z FUSION OF LEFT TARSAL JOINT WITH INTERNAL FIXATION DEVICE, OPEN APPROACH: ICD-10-PCS | Performed by: PODIATRIST

## 2024-11-27 PROCEDURE — 28730 FUSION OF FOOT BONES: CPT | Mod: 51,HCNC,LT, | Performed by: PODIATRIST

## 2024-11-27 PROCEDURE — 27800903 OPTIME MED/SURG SUP & DEVICES OTHER IMPLANTS: Mod: HCNC | Performed by: PODIATRIST

## 2024-11-27 PROCEDURE — 11044 DBRDMT BONE 1ST 20 SQ CM/<: CPT | Mod: 59,HCNC,, | Performed by: PODIATRIST

## 2024-11-27 PROCEDURE — C9290 INJ, BUPIVACAINE LIPOSOME: HCPCS | Mod: HCNC

## 2024-11-27 PROCEDURE — 64447 NJX AA&/STRD FEMORAL NRV IMG: CPT | Mod: HCNC

## 2024-11-27 PROCEDURE — 36000708 HC OR TIME LEV III 1ST 15 MIN: Mod: HCNC | Performed by: PODIATRIST

## 2024-11-27 DEVICE — WIRE BAYONET W/ STPR 1.8X400MM: Type: IMPLANTABLE DEVICE | Site: FOOT | Status: FUNCTIONAL

## 2024-11-27 DEVICE — WIRE TRUELOK BAYONET 1.8X400MM: Type: IMPLANTABLE DEVICE | Site: FOOT | Status: FUNCTIONAL

## 2024-11-27 DEVICE — IMPLANTABLE DEVICE: Type: IMPLANTABLE DEVICE | Site: FOOT | Status: FUNCTIONAL

## 2024-11-27 RX ORDER — OXYCODONE AND ACETAMINOPHEN 5; 325 MG/1; MG/1
1 TABLET ORAL EVERY 4 HOURS PRN
Status: DISCONTINUED | OUTPATIENT
Start: 2024-11-27 | End: 2024-12-03 | Stop reason: HOSPADM

## 2024-11-27 RX ORDER — PANTOPRAZOLE SODIUM 40 MG/1
40 TABLET, DELAYED RELEASE ORAL DAILY
Status: DISCONTINUED | OUTPATIENT
Start: 2024-11-28 | End: 2024-12-03 | Stop reason: HOSPADM

## 2024-11-27 RX ORDER — LIDOCAINE HYDROCHLORIDE 20 MG/ML
INJECTION, SOLUTION EPIDURAL; INFILTRATION; INTRACAUDAL; PERINEURAL
Status: DISCONTINUED | OUTPATIENT
Start: 2024-11-27 | End: 2024-11-27

## 2024-11-27 RX ORDER — CEFAZOLIN SODIUM 1 G/3ML
1 INJECTION, POWDER, FOR SOLUTION INTRAMUSCULAR; INTRAVENOUS
Status: DISCONTINUED | OUTPATIENT
Start: 2024-11-27 | End: 2024-11-28

## 2024-11-27 RX ORDER — GLUCAGON 1 MG
1 KIT INJECTION
Status: DISCONTINUED | OUTPATIENT
Start: 2024-11-27 | End: 2024-12-03 | Stop reason: HOSPADM

## 2024-11-27 RX ORDER — IBUPROFEN 200 MG
16 TABLET ORAL
Status: DISCONTINUED | OUTPATIENT
Start: 2024-11-27 | End: 2024-12-03 | Stop reason: HOSPADM

## 2024-11-27 RX ORDER — CEFAZOLIN 2 G/1
2 INJECTION, POWDER, FOR SOLUTION INTRAMUSCULAR; INTRAVENOUS
Status: COMPLETED | OUTPATIENT
Start: 2024-11-27 | End: 2024-11-27

## 2024-11-27 RX ORDER — CIPROFLOXACIN 2 MG/ML
400 INJECTION, SOLUTION INTRAVENOUS
Status: DISCONTINUED | OUTPATIENT
Start: 2024-11-27 | End: 2024-11-28

## 2024-11-27 RX ORDER — INSULIN GLARGINE 100 [IU]/ML
15 INJECTION, SOLUTION SUBCUTANEOUS NIGHTLY
Status: DISCONTINUED | OUTPATIENT
Start: 2024-11-28 | End: 2024-12-03 | Stop reason: HOSPADM

## 2024-11-27 RX ORDER — MIDAZOLAM HYDROCHLORIDE 5 MG/ML
INJECTION INTRAMUSCULAR; INTRAVENOUS
Status: DISCONTINUED | OUTPATIENT
Start: 2024-11-27 | End: 2024-11-27

## 2024-11-27 RX ORDER — ONDANSETRON HYDROCHLORIDE 2 MG/ML
INJECTION, SOLUTION INTRAVENOUS
Status: DISCONTINUED | OUTPATIENT
Start: 2024-11-27 | End: 2024-11-27

## 2024-11-27 RX ORDER — GLUCAGON 1 MG
1 KIT INJECTION
Status: DISCONTINUED | OUTPATIENT
Start: 2024-11-27 | End: 2024-11-27 | Stop reason: HOSPADM

## 2024-11-27 RX ORDER — OXYCODONE AND ACETAMINOPHEN 10; 325 MG/1; MG/1
1 TABLET ORAL EVERY 4 HOURS PRN
Status: DISCONTINUED | OUTPATIENT
Start: 2024-11-27 | End: 2024-12-03 | Stop reason: HOSPADM

## 2024-11-27 RX ORDER — INSULIN ASPART 100 [IU]/ML
0-10 INJECTION, SOLUTION INTRAVENOUS; SUBCUTANEOUS
Status: DISCONTINUED | OUTPATIENT
Start: 2024-11-27 | End: 2024-12-03 | Stop reason: HOSPADM

## 2024-11-27 RX ORDER — SODIUM CHLORIDE 0.9 % (FLUSH) 0.9 %
10 SYRINGE (ML) INJECTION
Status: DISCONTINUED | OUTPATIENT
Start: 2024-11-27 | End: 2024-11-27 | Stop reason: HOSPADM

## 2024-11-27 RX ORDER — ONDANSETRON HYDROCHLORIDE 2 MG/ML
4 INJECTION, SOLUTION INTRAVENOUS EVERY 8 HOURS PRN
Status: DISCONTINUED | OUTPATIENT
Start: 2024-11-27 | End: 2024-12-03 | Stop reason: HOSPADM

## 2024-11-27 RX ORDER — PROPOFOL 10 MG/ML
VIAL (ML) INTRAVENOUS
Status: DISCONTINUED | OUTPATIENT
Start: 2024-11-27 | End: 2024-11-27

## 2024-11-27 RX ORDER — FENTANYL CITRATE 50 UG/ML
25 INJECTION, SOLUTION INTRAMUSCULAR; INTRAVENOUS EVERY 5 MIN PRN
Status: DISCONTINUED | OUTPATIENT
Start: 2024-11-27 | End: 2024-11-27 | Stop reason: HOSPADM

## 2024-11-27 RX ORDER — HALOPERIDOL 5 MG/ML
0.5 INJECTION INTRAMUSCULAR EVERY 10 MIN PRN
Status: DISCONTINUED | OUTPATIENT
Start: 2024-11-27 | End: 2024-11-27 | Stop reason: HOSPADM

## 2024-11-27 RX ORDER — HYDROMORPHONE HYDROCHLORIDE 1 MG/ML
1 INJECTION, SOLUTION INTRAMUSCULAR; INTRAVENOUS; SUBCUTANEOUS
Status: DISCONTINUED | OUTPATIENT
Start: 2024-11-27 | End: 2024-12-03 | Stop reason: HOSPADM

## 2024-11-27 RX ORDER — HYDROMORPHONE HYDROCHLORIDE 2 MG/ML
INJECTION, SOLUTION INTRAMUSCULAR; INTRAVENOUS; SUBCUTANEOUS
Status: DISCONTINUED | OUTPATIENT
Start: 2024-11-27 | End: 2024-11-27

## 2024-11-27 RX ORDER — INSULIN GLARGINE 100 [IU]/ML
15 INJECTION, SOLUTION SUBCUTANEOUS NIGHTLY
Status: DISCONTINUED | OUTPATIENT
Start: 2024-11-28 | End: 2024-11-27

## 2024-11-27 RX ORDER — SODIUM CHLORIDE 0.9 % (FLUSH) 0.9 %
10 SYRINGE (ML) INJECTION
Status: DISCONTINUED | OUTPATIENT
Start: 2024-11-27 | End: 2024-12-03 | Stop reason: HOSPADM

## 2024-11-27 RX ORDER — BUPIVACAINE 13.3 MG/ML
INJECTION, SUSPENSION, LIPOSOMAL INFILTRATION
Status: DISCONTINUED | OUTPATIENT
Start: 2024-11-27 | End: 2024-11-27

## 2024-11-27 RX ORDER — ENOXAPARIN SODIUM 100 MG/ML
40 INJECTION SUBCUTANEOUS EVERY 24 HOURS
Status: DISCONTINUED | OUTPATIENT
Start: 2024-11-28 | End: 2024-12-03 | Stop reason: HOSPADM

## 2024-11-27 RX ORDER — PHENYLEPHRINE HCL IN 0.9% NACL 1 MG/10 ML
SYRINGE (ML) INTRAVENOUS
Status: DISCONTINUED | OUTPATIENT
Start: 2024-11-27 | End: 2024-11-27

## 2024-11-27 RX ORDER — ROCURONIUM BROMIDE 10 MG/ML
INJECTION, SOLUTION INTRAVENOUS
Status: DISCONTINUED | OUTPATIENT
Start: 2024-11-27 | End: 2024-11-27

## 2024-11-27 RX ORDER — BUPIVACAINE HYDROCHLORIDE 5 MG/ML
INJECTION, SOLUTION EPIDURAL; INTRACAUDAL
Status: COMPLETED | OUTPATIENT
Start: 2024-11-27 | End: 2024-11-27

## 2024-11-27 RX ORDER — DEXAMETHASONE SODIUM PHOSPHATE 4 MG/ML
INJECTION, SOLUTION INTRA-ARTICULAR; INTRALESIONAL; INTRAMUSCULAR; INTRAVENOUS; SOFT TISSUE
Status: DISCONTINUED | OUTPATIENT
Start: 2024-11-27 | End: 2024-11-27

## 2024-11-27 RX ORDER — HYDROMORPHONE HYDROCHLORIDE 2 MG/ML
0.2 INJECTION, SOLUTION INTRAMUSCULAR; INTRAVENOUS; SUBCUTANEOUS EVERY 5 MIN PRN
Status: DISCONTINUED | OUTPATIENT
Start: 2024-11-27 | End: 2024-11-27 | Stop reason: HOSPADM

## 2024-11-27 RX ORDER — TOBRAMYCIN 40 MG/ML
240 INJECTION INTRAMUSCULAR; INTRAVENOUS ONCE
Status: DISCONTINUED | OUTPATIENT
Start: 2024-11-27 | End: 2024-11-29

## 2024-11-27 RX ORDER — ACETAMINOPHEN 325 MG/1
650 TABLET ORAL EVERY 4 HOURS PRN
Status: DISCONTINUED | OUTPATIENT
Start: 2024-11-27 | End: 2024-12-03 | Stop reason: HOSPADM

## 2024-11-27 RX ORDER — IBUPROFEN 200 MG
24 TABLET ORAL
Status: DISCONTINUED | OUTPATIENT
Start: 2024-11-27 | End: 2024-12-03 | Stop reason: HOSPADM

## 2024-11-27 RX ORDER — ACETAMINOPHEN 10 MG/ML
INJECTION, SOLUTION INTRAVENOUS
Status: DISCONTINUED | OUTPATIENT
Start: 2024-11-27 | End: 2024-11-27

## 2024-11-27 RX ORDER — PHENYLEPHRINE HYDROCHLORIDE 10 MG/ML
INJECTION INTRAVENOUS
Status: DISCONTINUED | OUTPATIENT
Start: 2024-11-27 | End: 2024-11-27

## 2024-11-27 RX ORDER — ALBUMIN HUMAN 50 G/1000ML
SOLUTION INTRAVENOUS
Status: DISCONTINUED | OUTPATIENT
Start: 2024-11-27 | End: 2024-11-27

## 2024-11-27 RX ADMIN — Medication 100 MCG: at 12:11

## 2024-11-27 RX ADMIN — Medication 200 MCG: at 12:11

## 2024-11-27 RX ADMIN — HYDROMORPHONE HYDROCHLORIDE 0.6 MG: 2 INJECTION INTRAMUSCULAR; INTRAVENOUS; SUBCUTANEOUS at 04:11

## 2024-11-27 RX ADMIN — PHENYLEPHRINE HYDROCHLORIDE 200 MCG: 10 INJECTION INTRAVENOUS at 04:11

## 2024-11-27 RX ADMIN — ROCURONIUM BROMIDE 30 MG: 10 INJECTION INTRAVENOUS at 12:11

## 2024-11-27 RX ADMIN — CEFAZOLIN 2 G: 2 INJECTION, POWDER, FOR SOLUTION INTRAMUSCULAR; INTRAVENOUS at 04:11

## 2024-11-27 RX ADMIN — MIDAZOLAM HYDROCHLORIDE 2 MG: 5 INJECTION, SOLUTION INTRAMUSCULAR; INTRAVENOUS at 11:11

## 2024-11-27 RX ADMIN — BUPIVACAINE 5 ML: 13.3 INJECTION, SUSPENSION, LIPOSOMAL INFILTRATION at 11:11

## 2024-11-27 RX ADMIN — SODIUM CHLORIDE: 0.9 INJECTION, SOLUTION INTRAVENOUS at 12:11

## 2024-11-27 RX ADMIN — PHENYLEPHRINE HYDROCHLORIDE 200 MCG: 10 INJECTION INTRAVENOUS at 03:11

## 2024-11-27 RX ADMIN — FENTANYL CITRATE 50 MCG: 50 INJECTION INTRAMUSCULAR; INTRAVENOUS at 03:11

## 2024-11-27 RX ADMIN — BUPIVACAINE HYDROCHLORIDE 15 ML: 5 INJECTION, SOLUTION EPIDURAL; INTRACAUDAL; PERINEURAL at 11:11

## 2024-11-27 RX ADMIN — DEXAMETHASONE SODIUM PHOSPHATE 4 MG: 4 INJECTION, SOLUTION INTRA-ARTICULAR; INTRALESIONAL; INTRAMUSCULAR; INTRAVENOUS; SOFT TISSUE at 12:11

## 2024-11-27 RX ADMIN — ALBUMIN (HUMAN) 250 ML: 12.5 SOLUTION INTRAVENOUS at 03:11

## 2024-11-27 RX ADMIN — SODIUM CHLORIDE: 0.9 INJECTION, SOLUTION INTRAVENOUS at 11:11

## 2024-11-27 RX ADMIN — HYDROMORPHONE HYDROCHLORIDE 0.4 MG: 2 INJECTION INTRAMUSCULAR; INTRAVENOUS; SUBCUTANEOUS at 04:11

## 2024-11-27 RX ADMIN — BUPIVACAINE HYDROCHLORIDE 15 ML: 5 INJECTION, SOLUTION EPIDURAL; INTRACAUDAL at 11:11

## 2024-11-27 RX ADMIN — ACETAMINOPHEN 1000 MG: 10 INJECTION, SOLUTION INTRAVENOUS at 03:11

## 2024-11-27 RX ADMIN — Medication 200 MCG: at 01:11

## 2024-11-27 RX ADMIN — Medication 150 MCG: at 12:11

## 2024-11-27 RX ADMIN — PHENYLEPHRINE HYDROCHLORIDE 100 MCG: 10 INJECTION INTRAVENOUS at 04:11

## 2024-11-27 RX ADMIN — PHENYLEPHRINE HYDROCHLORIDE 0.5 MCG/KG/MIN: 10 INJECTION INTRAVENOUS at 01:11

## 2024-11-27 RX ADMIN — Medication 100 MCG: at 02:11

## 2024-11-27 RX ADMIN — LIDOCAINE HYDROCHLORIDE 100 MG: 20 INJECTION, SOLUTION EPIDURAL; INFILTRATION; INTRACAUDAL; PERINEURAL at 11:11

## 2024-11-27 RX ADMIN — CEFAZOLIN 2 G: 2 INJECTION, POWDER, FOR SOLUTION INTRAMUSCULAR; INTRAVENOUS at 12:11

## 2024-11-27 RX ADMIN — CIPROFLOXACIN 400 MG: 2 INJECTION, SOLUTION INTRAVENOUS at 08:11

## 2024-11-27 RX ADMIN — ROCURONIUM BROMIDE 50 MG: 10 INJECTION INTRAVENOUS at 11:11

## 2024-11-27 RX ADMIN — ONDANSETRON 4 MG: 2 INJECTION, SOLUTION INTRAMUSCULAR; INTRAVENOUS at 03:11

## 2024-11-27 RX ADMIN — MIDAZOLAM HYDROCHLORIDE 3 MG: 5 INJECTION, SOLUTION INTRAMUSCULAR; INTRAVENOUS at 11:11

## 2024-11-27 RX ADMIN — PHENYLEPHRINE HYDROCHLORIDE 100 MCG: 10 INJECTION INTRAVENOUS at 03:11

## 2024-11-27 RX ADMIN — PROPOFOL 150 MG: 10 INJECTION, EMULSION INTRAVENOUS at 11:11

## 2024-11-27 RX ADMIN — SUGAMMADEX 200 MG: 100 INJECTION, SOLUTION INTRAVENOUS at 03:11

## 2024-11-27 RX ADMIN — FENTANYL CITRATE 50 MCG: 50 INJECTION INTRAMUSCULAR; INTRAVENOUS at 11:11

## 2024-11-27 RX ADMIN — CEFAZOLIN 1 G: 330 INJECTION, POWDER, FOR SOLUTION INTRAMUSCULAR; INTRAVENOUS at 08:11

## 2024-11-27 NOTE — ANESTHESIA PROCEDURE NOTES
Intubation    Date/Time: 11/27/2024 12:00 PM    Performed by: Mango Rascon DO  Authorized by: Berkley Suh MD    Intubation:     Induction:  Intravenous    Intubated:  Postinduction    Mask Ventilation:  Easy with oral airway    Attempts:  1    Attempted By:  Resident anesthesiologist    Method of Intubation:  Video laryngoscopy    Blade:  Murry 3    Laryngeal View Grade: Grade I - full view of cords      Difficult Airway Encountered?: No      Complications:  None    Airway Device:  Oral endotracheal tube    Airway Device Size:  7.5    Style/Cuff Inflation:  Cuffed (inflated to minimal occlusive pressure)    Tube secured:  23    Secured at:  The lips    Placement Verified By:  Capnometry    Complicating Factors:  None    Findings Post-Intubation:  BS equal bilateral and atraumatic/condition of teeth unchanged

## 2024-11-27 NOTE — BRIEF OP NOTE
[place] - Surgery ([facility])  Brief Operative Note    SUMMARY     Surgery Date: 11/27/2024     Surgeons and Role:     * Daniel Reyes, DPM - Primary    Assisting Surgeon: Ida Rudd DPM PGY 2 and Twyla Richards DPM PGY 3    Pre-op Diagnosis:  Chronic multifocal osteomyelitis of left foot [M86.372]  Subluxation of tarsal joint of foot, left, sequela [S93.312S]  Type 2 diabetes mellitus with Charcot joint arthropathy [E11.610]  Equinus contracture of left ankle [M24.572]  Exostosis of bone of ankle [M89.8X7]  Acquired varus deformity of foot, left [M21.172]    Post-op Diagnosis:  Post-Op Diagnosis Codes:     * Chronic multifocal osteomyelitis of left foot [M86.372]     * Subluxation of tarsal joint of foot, left, sequela [S93.312S]     * Type 2 diabetes mellitus with Charcot joint arthropathy [E11.610]     * Equinus contracture of left ankle [M24.572]     * Exostosis of bone of ankle [M89.8X7]     * Acquired varus deformity of foot, left [M21.172]    Procedure(s) (LRB):  IRRIGATION AND DEBRIDEMENT of osteomyelitic bone (Left) foot  OPEN BONE BIOPSY LEFT FOOT CALCANEUS  3.   IMPLANTATION OF ANTIBIOTIC ELUDING CEMENT AND BIOACTIVE GLASS LEFT FOOT  4.   FUSION OF TALONAVICULAR JOINT LEFT FOOT WITH PERCUTANEOUS PINNING  5.   EXOSTECTOMY LEFT ANKLE  6.   ACHILLES TENDON LENGTHENING LEFT  7.   APPLICATION, MULTIPLE PLANE EXTERNAL FIXATION SYSTEM LEFT LOWER EXTREMITY      Anesthesia: General/Regional    Implants:  Implant Name Type Inv. Item Serial No.  Lot No. LRB No. Used Action   [employer] KIT    ORTHOFIX  Left 1 Implanted   TL PLUS FULL RING, 160MM    ORTHOFIX  Left 1 Implanted   TL, BOLT, 12MM    ORTHOFIX  Left 1 Implanted   TL 8MM  HALF PIN BOLT    ORTHOFIX  Left 1 Implanted   TL, NUT STAINLESS STEEL, 10MM    ORTHOFIX  Left 1 Implanted   TL, BOLT, WIRE FIXATION, UNIVERSAL    ORTHOFIX  Left 1 Implanted   TL+ EXTENDED NUT M6X1    ORTHOFIX  Left 1 Implanted   TL, NUT STAINLESS STEEL, 10MM, QTY,  20    ORTHOFIX  Left 1 Implanted   TL+ TWO HOLE POST    ORTHOFIX  Left 1 Implanted   STOPPER, RED RUBBER, WIRE    ORTHOFIX  Left 1 Implanted   TL+ THREADED HEX END LUBNA 200MM    ORTHOFIX  Left 1 Implanted   WIRE TRUELOK BAYONET [id number] - [id number]  WIRE TRUELOK BAYONET [id number]  ORTHOFIX  Left 1 Implanted   WIRE BAYONET W/ STPR [id number] - [id number]  WIRE BAYONET W/ STPR [id number]  ORTHOFIX  Left 1 Implanted   [employer]     BS-23/22/8 Left 2 Implanted   Truelok nut with washer    ORTHOFIX [id number] Left 2 Implanted   [employer] Jordon RX Strut medium    ORTHOFIX [id number] Left 1 Implanted   [employer] Jordon RX Strut medium    ORTHOFIX [id number] Left 1 Implanted   [employer] Jordon Wire Fixation bolt    ORTHOFIX [id number] Left 1 Implanted   Truelock Jordon RXfoot arch    ORTHOFIX [id number] Left 1 Implanted   [employer] Jordon RX foot plate    ORTHOFIX [id number] Left 1 Implanted   [employer] Jordon Wire fixation bolt    ORTHOFIX [id number] Left 1 Implanted   Truel[employer] Jordon RX Strut medium    ORTHOFIX [id number] Left 2 Implanted   Truelock Jordon Wire fixation bolt    ORTHOFIX [id number] Left 1 Implanted       Operative Findings: necrotic and non-viable appearing cuboid bone that was fully excised. Calcaneus aggressively prepped to firm appearing bone in addition to surrounding second and third metatarsal bases. TNJ directly visualized and noted direct extension to joint so it was also prepped for fusion. Biopsied clean residual margin of the calcaneus after irrigation with separate clean rongeur. Ex-fix applied to stabilize bone and hold reduction of ankle    Estimated Blood Loss: 250 ML    Estimated Blood Loss has been documented.         Specimens:   Specimen (24h ago, onward)       Start     Ordered    11/27/24 1453  Specimen to Pathology, Surgery Orthopedics  Once        Comments: Pre-op Diagnosis: Chronic multifocal osteomyelitis of left foot [M86.372]Subluxation of tarsal joint of foot, left, sequela [S93.312S]Type 2 diabetes  mellitus with Charcot joint arthropathy [E11.610]Equinus contracture of left ankle [M24.572]Exostosis of bone of ankle [M89.8X7]Acquired varus deformity of foot, left [M21.172]Procedure(s):IRRIGATION AND DEBRIDEMENTAPPLICATION, EXTERNAL FIXATION SYSTEM, MULTIPLANAR, WITH IMAGING GUIDANCE Number of specimens: 1Name of specimens: 1. Left foot Calcaneous- perm     References:    Click here for ordering Quick Tip   Question Answer Comment   Procedure Type: Orthopedics    Specimen Class: Routine/Screening    Release to patient Immediate        11/27/24 1453    11/27/24 1410  Specimen to Pathology, Surgery Orthopedics  Once,   Status:  Canceled        Comments: Pre-op Diagnosis: Chronic multifocal osteomyelitis of left foot [M86.372]Subluxation of tarsal joint of foot, left, sequela [S93.312S]Type 2 diabetes mellitus with Charcot joint arthropathy [E11.610]Equinus contracture of left ankle [M24.572]Exostosis of bone of ankle [M89.8X7]Acquired varus deformity of foot, left [M21.172]Procedure(s):IRRIGATION AND DEBRIDEMENTAPPLICATION, EXTERNAL FIXATION SYSTEM, MULTIPLANAR, WITH IMAGING GUIDANCE Number of specimens: 1Name of specimens: 1. Left foot Calcaneous- perm     References:    Click here for ordering Quick Tip   Question Answer Comment   Procedure Type: Orthopedics    Specimen Class: Routine/Screening    Release to patient Immediate        11/27/24 1410                    [id number]

## 2024-11-27 NOTE — ANESTHESIA PROCEDURE NOTES
Peripheral Block    Patient location during procedure: pre-op   Block not for primary anesthetic.  Reason for block: at surgeon's request and post-op pain management   Post-op Pain Location: L foot   Start time: 11/27/2024 11:30 AM  Timeout: 11/27/2024 11:29 AM   End time: 11/27/2024 11:38 AM    Staffing  Authorizing Provider: Berkley Suh MD  Performing Provider: Mango Rascon DO    Staffing  Performed by: Mango Rascon DO  Authorized by: Berkley Suh MD    Preanesthetic Checklist  Completed: patient identified, IV checked, site marked, risks and benefits discussed, surgical consent, monitors and equipment checked, pre-op evaluation and timeout performed  Peripheral Block  Patient position: supine  Prep: ChloraPrep  Patient monitoring: heart rate, cardiac monitor, continuous pulse ox, continuous capnometry and frequent blood pressure checks  Block type: popliteal  Laterality: left  Injection technique: single shot  Needle  Needle type: Stimuplex   Needle gauge: 20 G  Needle length: 4 in  Needle localization: anatomical landmarks and ultrasound guidance   -ultrasound image captured on disc.  Assessment  Injection assessment: negative aspiration, negative parasthesia and local visualized surrounding nerve  Paresthesia pain: none  Heart rate change: no  Slow fractionated injection: yes    Medications:    Medications: bupivacaine (pf) (MARCAINE) injection 0.5% - Perineural   15 mL - 11/27/2024 11:35:00 AM    Additional Notes  VSS.  [facility] RN monitoring vitals throughout procedure.  Patient tolerated procedure well.

## 2024-11-27 NOTE — TRANSFER OF CARE
Anesthesia Transfer of Care Note    Patient: Indio Ryder Jr.    Procedure(s) Performed: Procedure(s) (LRB):  IRRIGATION AND DEBRIDEMENT (Left)  APPLICATION, EXTERNAL FIXATION SYSTEM, MULTIPLANAR, WITH IMAGING GUIDANCE (Left)    Patient location: PACU    Anesthesia Type: general    Transport from OR: Transported from OR on 6-10 L/min O2 by face mask with adequate spontaneous ventilation    Post pain: adequate analgesia    Post assessment: no apparent anesthetic complications and tolerated procedure well    Post vital signs: stable    Level of consciousness: awake    Nausea/Vomiting: no nausea/vomiting    Complications: none    Transfer of care protocol was followed      Last vitals: Visit Vitals  /82   Pulse 91   Temp 37.1 °C (98.7 °F)   Resp 15   SpO2 99%

## 2024-11-27 NOTE — ANESTHESIA PROCEDURE NOTES
Peripheral Block    Patient location during procedure: pre-op   Block not for primary anesthetic.  Reason for block: at surgeon's request and post-op pain management   Post-op Pain Location: L foot   Start time: 11/27/2024 11:40 AM  Timeout: 11/27/2024 11:29 AM   End time: 11/27/2024 11:45 AM    Staffing  Authorizing Provider: Mango Rascon DO  Performing Provider: Mango Rascon DO    Staffing  Performed by: Mango Rascon DO  Authorized by: Mango Rascon DO    Preanesthetic Checklist  Completed: patient identified, IV checked, site marked, risks and benefits discussed, surgical consent, monitors and equipment checked, pre-op evaluation and timeout performed  Peripheral Block  Patient position: supine  Prep: ChloraPrep  Patient monitoring: heart rate, cardiac monitor, continuous pulse ox, continuous capnometry and frequent blood pressure checks  Block type: adductor canal  Laterality: left  Injection technique: single shot  Needle  Needle type: Stimuplex   Needle gauge: 20 G  Needle length: 4 in  Needle localization: anatomical landmarks and ultrasound guidance   -ultrasound image captured on disc.  Assessment  Injection assessment: negative aspiration, negative parasthesia and local visualized surrounding nerve  Paresthesia pain: none  Heart rate change: no  Slow fractionated injection: yes    Medications:    Medications: bupivacaine (pf) (MARCAINE) injection 0.5% - Perineural   15 mL - 11/27/2024 11:43:00 AM    Additional Notes  VSS.  [facility] RN monitoring vitals throughout procedure.  Patient tolerated procedure well.

## 2024-11-27 NOTE — ANESTHESIA PREPROCEDURE EVALUATION
[facility]  Anesthesia Pre-Operative Evaluation         Patient Name: Indio Ryder Jr.  YOB: 1970  MRN: [id number]    SUBJECTIVE:     Pre-operative evaluation for Procedure(s) (LRB):  IRRIGATION AND DEBRIDEMENT (Left)  APPLICATION, EXTERNAL FIXATION SYSTEM, MULTIPLANAR, WITH IMAGING GUIDANCE (Left)     11/26/2024    Indio Ryder Jr. is a 54 y.o. male w/ a significant PMHx of HTN, HLD, T2DM w/ insulin use, hypertensive retinopathy, chronic osteomyelitis, and colon cancer who is presenting for surgical evaluation of left foot wound.    Patient now presents for the above procedure(s).    Echo Summary  TTE (2022)  Summary    Eccentric hypertrophy and low normal systolic function.  Mild to moderate left atrial enlargement.  Mild tricuspid regurgitation.  The estimated ejection fraction is 50%.  Grade I left ventricular diastolic dysfunction.  Normal right ventricular size with normal right ventricular systolic function.  Mild right atrial enlargement.  Normal central venous pressure (3 mmHg).  The estimated PA systolic pressure is 21 mmHg.          Prev airway:     Intubation     Date/Time: 4/25/2022 12:07 PM  Performed by: Ti Rivers MD  Authorized by: Rhonda G Leopold, MD      Intubation:     Induction:  Intravenous    Intubated:  Postinduction    Mask Ventilation:  Moderately difficult with oral airway    Attempts:  1    Attempted By:  Resident anesthesiologist    Method of Intubation:  Direct    Blade:  Mayito 3    Laryngeal View Grade: Grade IIA - cords partially seen      Difficult Airway Encountered?: No      Complications:  None    Airway Device:  Oral endotracheal tube    Airway Device Size:  8.0    Style/Cuff Inflation:  Cuffed    Tube secured:  22    Secured at:  The teeth    Placement Verified By:  Capnometry    Complicating Factors:  None    Findings Post-Intubation:  BS equal bilateral    LDA: None documented.       Drips: None documented.      Patient  Active Problem List   Diagnosis    Essential hypertension    Mixed hyperlipidemia    Type 2 diabetes mellitus with hyperglycemia, with long-term current use of insulin    Actinomyces infection    Hypokalemia    Neck pain    Diabetic ulcer of right midfoot associated with type 2 diabetes mellitus, with necrosis of bone    Diabetic ulcer of left midfoot associated with type 2 diabetes mellitus, with bone involvement without evidence of necrosis    Hypertensive retinopathy, bilateral    Subacute osteomyelitis of left foot    Allergic reaction due to antibacterial drug    Chronic left shoulder pain    Type 2 diabetes mellitus with 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 edema, with long-term current use of insulin    Diabetic ulcer of left foot    Septic arthritis of left foot    Acute on chronic osteomyelitis    History of colon cancer    Diabetic ulcer of right foot associated with type 2 diabetes mellitus, with fat layer exposed    Chronic osteomyelitis of right foot    Pre-ulcerative calluses    Eversion deformity of foot, left    Hx of amputation of lesser toe, left    Type II diabetes mellitus with neurological manifestations    Chronic osteomyelitis of left foot    Equinus deformity of both feet    H/O amputation of lesser toe, right    Right great toe amputee    Blister (nonthermal), left foot, initial encounter    Blister (nonthermal), left foot, sequela    Abscess of left arm    Colon adenocarcinoma       Review of patient's allergies indicates:  No Known Allergies    Current Inpatient Medications:      No current facility-administered medications on file prior to encounter.     Current Outpatient Medications on File Prior to Encounter   Medication Sig Dispense Refill    blood sugar diagnostic Strp use to test blood sugar 4 times daily 100 each 0    blood-glucose meter Misc Use as instructed 1 each 1    ciprofloxacin HCl (CIPRO) 750 MG tablet Take 1 tablet (750 mg total) by mouth  "every 12 (twelve) hours. 84 tablet 0    empagliflozin (JARDIANCE) 25 mg tablet Take 1 tablet (25 mg total) by mouth once daily. 30 tablet 8    insulin aspart U-100 (NOVOLOG) 100 unit/mL (3 mL) InPn pen Inject if sugar is > 180 up to 4x a day , 180-230+2, 231-280+4, 281-330+6, 331-380+8, >380+10. Max daily 40 units 15 mL 2    insulin glargine U-300 conc (TOUJEO MAX U-300 SOLOSTAR) 300 unit/mL (3 mL) insulin pen Inject 26 to 32 units under the skin at night 2 Pen 6    lancets 33 gauge Misc 1 lancet  by Misc.(Non-Drug; Combo Route) route 4 (four) times daily. 100 each 0    pantoprazole (PROTONIX) 40 MG tablet Take 1 tablet (40 mg total) by mouth once daily. 90 tablet 0    pen needle, diabetic (BD SASCHA 2ND GEN PEN NEEDLE) 32 gauge x 5/32" Ndle Use 4 times a day 400 each 3       Past Surgical History:   Procedure Laterality Date    COLONOSCOPY Right 1/19/2022    Procedure: COLONOSCOPY;  Surgeon: Tj Haile MD;  Location: [facility] (4TH FLR);  Service: Endoscopy;  Laterality: Right;  previous order un-usable/poor prep 1/18/22  RAPID COVID test arrival 12:20  instructions handed to pt-     COLONOSCOPY N/A 3/21/2022    Procedure: COLONOSCOPY;  Surgeon: Sheng Haque MD;  Location: [facility] (2ND FLR);  Service: Endoscopy;  Laterality: N/A;  Colonoscopy with AES for hepatix flexure polyp removal, 2nd floor  Pt is fully vaccinated-DS  Pt prescribed Plavix by Dr. Stahl in Jan. 2022, however pt states that he never started taking it-DS  3/16/22-Instructions sent via portal-DS    COLONOSCOPY N/A 9/13/2023    Procedure: COLONOSCOPY;  Surgeon: Erik Stout MD;  Location: [facility] ENDO (4TH FLR);  Service: Colon and Rectal;  Laterality: N/A;  Referred by timur Saunders, [facility], portal -ml    DEBRIDEMENT OF FOOT Left 7/14/2019    Procedure: DEBRIDEMENT, FOOT, with left 5th ray amputation;  Surgeon: Sis Hickman DPM;  Location: [facility] OR [address] [place] [address];  Service: Podiatry;  Laterality: Left;    DEBRIDEMENT OF FOOT Left " 7/17/2019    Procedure: DEBRIDEMENT, FOOT with leftt 5th ray partial amputation with Neox Graft;  Surgeon: Mai Burrell DPM;  Location: [facility] OR 2ND FLR;  Service: Podiatry;  Laterality: Left;  t    DEBRIDEMENT OF FOOT Bilateral 4/29/2021    Procedure: DEBRIDEMENT, FOOT;  Surgeon: Mai Burrell DPM;  Location: [facility] OR 1ST FLR;  Service: Podiatry;  Laterality: Bilateral;  Graft application    DEBRIDEMENT OF FOOT Left 7/31/2023    Procedure: DEBRIDEMENT, FOOT;  Surgeon: Marivel Peterson DPM;  Location: [facility] OR 2ND FLR;  Service: Podiatry;  Laterality: Left;    TOE AMPUTATION Right 06/05/2015    TOE AMPUTATION Right 01/19/2017    XI ROBOTIC COLECTOMY, RIGHT N/A 4/25/2022    Procedure: XI ROBOTIC COLECTOMY, RIGHT (lithotomy, eras low);  Surgeon: Erik Stout MD;  Location: [facility] OR 2ND FLR;  Service: Colon and Rectal;  Laterality: N/A;  Paruch to Goodwin    XI ROBOTIC COLECTOMY, RIGHT  4/25/2022    Procedure: ;  Surgeon: Erik Stout MD;  Location: [facility] OR 2ND FLR;  Service: Colon and Rectal;;       Social History:  Tobacco Use: Low Risk  (11/25/2024)    Patient History     Smoking Tobacco Use: Never     Smokeless Tobacco Use: Never     Passive Exposure: Not on file      Alcohol Use: Not At Risk (7/2/2024)    AUDIT-C     Frequency of Alcohol Consumption: Never     Average Number of Drinks: Patient does not drink     Frequency of Binge Drinking: Never        OBJECTIVE:     Vital Signs Range (Last 24H):  BP: ()/()   Arterial Line BP: ()/()       Significant Labs:  Lab Results   Component Value Date    WBC 5.43 11/25/2024    HGB 10.3 (L) 11/25/2024    HCT 34.7 (L) 11/25/2024     11/25/2024    CHOL 181 01/13/2023    TRIG 92 01/13/2023    HDL 49 01/13/2023    ALT 12 11/25/2024    AST 16 11/25/2024     11/25/2024    K 3.8 11/25/2024     11/25/2024    CREATININE 0.9 11/25/2024    BUN 11 11/25/2024    CO2 25 11/25/2024    TSH 1.136 03/01/2012    PSA 0.65 08/11/2014    INR 1.0  10/16/2024    HGBA1C 11.0 (H) 10/16/2024       Diagnostic Studies: No relevant studies.    EKG:   Results for orders placed or performed in visit on 11/25/24   IN OFFICE EKG 12-LEAD (to [place])    Collection Time: 11/25/24  2:54 PM   Result Value Ref Range    QRS Duration 94 ms    OHS QTC Calculation 497 ms    Narrative    Test Reason : Z01.818,    Vent. Rate :  96 BPM     Atrial Rate :  96 BPM     P-R Int : 148 ms          QRS Dur :  94 ms      QT Int : 394 ms       P-R-T Axes :  71  78  62 degrees    QTcB Int : 497 ms    Normal sinus rhythm  Prolonged QT  Abnormal ECG  No previous ECGs available  Confirmed by Michael Weiss ([id number]) on 11/25/2024 3:06:54 PM    Referred By: RICHARD MARTINEZ           Confirmed By: Michael Weiss       2D ECHO:  TTE:  No results found. However, due to the size of the patient record, not all encounters were searched. Please check Results Review for a complete set of results.    FREDA:  No results found. However, due to the size of the patient record, not all encounters were searched. Please check Results Review for a complete set of results.    ASSESSMENT/PLAN:       Pre-op Assessment    I have reviewed the Patient Summary Reports.     I have reviewed the Nursing Notes. I have reviewed the NPO Status.   I have reviewed the Medications.     Review of Systems  Anesthesia Hx:  No problems with previous Anesthesia   History of prior surgery of interest to airway management or planning:            Denies Personal Hx of Anesthesia complications.                    Hematology/Oncology:                        --  Cancer in past history:                     Cardiovascular:     Hypertension           hyperlipidemia                               Pulmonary:  Pulmonary Normal                       Renal/:  Chronic Renal Disease                Hepatic/GI:  Hepatic/GI Normal                    Neurological:  Neurology Normal                                      Endocrine:  Diabetes, type 2, using  insulin               Physical Exam  General: Alert, Oriented, Well nourished and Cooperative    Airway:  Mallampati: II   Mouth Opening: Normal  TM Distance: Normal  Tongue: Normal  Neck ROM: Normal ROM    Dental:  Intact    Chest/Lungs:  Clear to auscultation, Normal Respiratory Rate    Heart:  Rate: Normal  Rhythm: Regular Rhythm        Anesthesia Plan  Type of Anesthesia, risks & benefits discussed:    Anesthesia Type: Gen ETT, Regional  Intra-op Monitoring Plan: Standard ASA Monitors  Post Op Pain Control Plan: multimodal analgesia and IV/PO Opioids PRN  Induction:  IV  Airway Plan: Video and Direct, Post-Induction  Informed Consent: Informed consent signed with the Patient and all parties understand the risks and agree with anesthesia plan.  All questions answered.   ASA Score: 3  Day of Surgery Review of History & Physical: H&P Update referred to the surgeon/provider.    Ready For Surgery From Anesthesia Perspective.     .

## 2024-11-27 NOTE — HPI
I have been asked by Dr Reyes to see Mr Britni Borjas for medical management s/p IMPLANTATION OF ANTIBIOTIC ELUDING CEMENT AND BIOACTIVE GLASS LEFT FOOT, OPEN BONE BIOPSY LEFT FOOT CALCANEUS, ACHILLES TENDON LENGTHENING LEFT. APPLICATION, MULTIPLE PLANE EXTERNAL FIXATION SYSTEM LEFT LOWER EXTREMITY, EXOSTECTOMY LEFT ANKLE, IRRIGATION AND DEBRIDEMENT of osteomyelitic bone (Left) foot for Chronic multifocal osteomyelitis of left foot, Subluxation of tarsal joint of foot, left, sequela, Acquired varus deformity of foot, left. Patient is 53 YO M with PMHx of diabetes Melitus, with complication of charcot foot, subacute osteomyelitis right foot, HTN, HLD, colon adenocarcinoma. Patient seen stated since having this foot infection his diabetes has not been well controlled with last A1C on 10/24 was 11. Stated post procedure he's feeling okay, denies foot pain wound dressing clean, Ex-fix applied to stabilize bone and hold reduction of ankle. Denies fever no CP or SOB.

## 2024-11-27 NOTE — SUBJECTIVE & OBJECTIVE
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    Right foot    Colon adenocarcinoma 04/12/2022    Diabetic ketoacidosis without coma associated with type 2 diabetes mellitus 05/30/2017    Diabetic ulcer of right foot associated with type 2 diabetes mellitus 06/03/2015    Disorder of kidney and ureter     Essential hypertension 06/06/2013    Group B streptococcal infection 01/13/2017    Mixed hyperlipidemia 08/12/2014    Septic arthritis of left foot 04/28/2021    Shoulder impingement 08/12/2014    Subacute osteomyelitis of right foot 01/12/2017    Streptococcus agalactiae, Actinomyces odontolyticus    Traumatic amputation of fifth toe of right foot 07/02/2015    Type 2 diabetes mellitus with diabetic neuropathy, with long-term current use of insulin 05/03/2016       Past Surgical History:   Procedure Laterality Date    COLONOSCOPY Right 1/19/2022    Procedure: COLONOSCOPY;  Surgeon: Tj Haile MD;  Location: [facility] (4TH FLR);  Service: Endoscopy;  Laterality: Right;  previous order un-usable/poor prep 1/18/22  RAPID COVID test arrival 12:20  instructions handed to pt- sm    COLONOSCOPY N/A 3/21/2022    Procedure: COLONOSCOPY;  Surgeon: Sheng Haque MD;  Location: [facility] (2ND FLR);  Service: Endoscopy;  Laterality: N/A;  Colonoscopy with AES for hepatix flexure polyp removal, 2nd floor  Pt is fully vaccinated-DS  Pt prescribed Plavix by Dr. Stahl in Jan. 2022, however pt states that he never started taking it-DS  3/16/22-Instructions sent via portal-DS    COLONOSCOPY N/A 9/13/2023    Procedure: COLONOSCOPY;  Surgeon: Erik Stout MD;  Location: [facility] (4TH FLR);  Service: Colon and Rectal;  Laterality: N/A;  Referred by timur Saunders, Meds, portal -ml    DEBRIDEMENT OF FOOT Left 7/14/2019    Procedure: DEBRIDEMENT, FOOT, with left 5th ray amputation;  Surgeon: Sis Hickman DPM;  Location: [facility] OR 2ND FLR;  Service: Podiatry;  Laterality: Left;     DEBRIDEMENT OF FOOT Left 7/17/2019    Procedure: DEBRIDEMENT, FOOT with leftt 5th ray partial amputation with Neox Graft;  Surgeon: Mai Burrell DPM;  Location: [facility] OR 2ND FLR;  Service: Podiatry;  Laterality: Left;  t    DEBRIDEMENT OF FOOT Bilateral 4/29/2021    Procedure: DEBRIDEMENT, FOOT;  Surgeon: Mai Burrell DPM;  Location: [facility] OR 1ST FLR;  Service: Podiatry;  Laterality: Bilateral;  Graft application    DEBRIDEMENT OF FOOT Left 7/31/2023    Procedure: DEBRIDEMENT, FOOT;  Surgeon: Marivel Peterson DPM;  Location: [facility] OR 2ND FLR;  Service: Podiatry;  Laterality: Left;    TOE AMPUTATION Right 06/05/2015    TOE AMPUTATION Right 01/19/2017    XI ROBOTIC COLECTOMY, RIGHT N/A 4/25/2022    Procedure: XI ROBOTIC COLECTOMY, RIGHT (lithotomy, eras low);  Surgeon: Erik Stout MD;  Location: [facility] OR 2ND FLR;  Service: Colon and Rectal;  Laterality: N/A;  Paruch to Goodwin    XI ROBOTIC COLECTOMY, RIGHT  4/25/2022    Procedure: ;  Surgeon: Erik Stout MD;  Location: [facility] OR 2ND FLR;  Service: Colon and Rectal;;       Review of patient's allergies indicates:  No Known Allergies    No current facility-administered medications on file prior to encounter.     Current Outpatient Medications on File Prior to Encounter   Medication Sig    empagliflozin (JARDIANCE) 25 mg tablet Take 1 tablet (25 mg total) by mouth once daily.    blood sugar diagnostic Strp use to test blood sugar 4 times daily    blood-glucose meter Misc Use as instructed    ciprofloxacin HCl (CIPRO) 750 MG tablet Take 1 tablet (750 mg total) by mouth every 12 (twelve) hours.    insulin aspart U-100 (NOVOLOG) 100 unit/mL (3 mL) InPn pen Inject if sugar is > 180 up to 4x a day , 180-230+2, 231-280+4, 281-330+6, 331-380+8, >380+10. Max daily 40 units    insulin glargine U-300 conc (TOUJEO MAX U-300 SOLOSTAR) 300 unit/mL (3 mL) insulin pen Inject 26 to 32 units under the skin at night    lancets 33 gauge Misc 1 lancet  by  "Misc.(Non-Drug; Combo Route) route 4 (four) times daily.    pantoprazole (PROTONIX) 40 MG tablet Take 1 tablet (40 mg total) by mouth once daily.    pen needle, diabetic (BD SASCHA 2ND GEN PEN NEEDLE) 32 gauge x 5/32" Ndle Use 4 times a day     Family History       Problem Relation (Age of Onset)    Cancer Mother    Cataracts Father    Diabetes Mother, Sister    Heart disease Father, Sister    Hypertension Mother, Father, Maternal Aunt    No Known Problems Brother, Sister, Maternal Uncle, Paternal Aunt, Paternal Uncle, Maternal Grandmother, Maternal Grandfather, Paternal Grandmother, Paternal Grandfather          Tobacco Use    Smoking status: Never    Smokeless tobacco: Never   Substance and Sexual Activity    Alcohol use: No    Drug use: No    Sexual activity: Yes     Partners: Female     Birth control/protection: Condom     Review of Systems   Constitutional:  Positive for activity change. Negative for appetite change, chills and fever.   Respiratory:  Negative for cough, chest tightness, shortness of breath and wheezing.    Cardiovascular:  Negative for chest pain and palpitations.   Gastrointestinal:  Negative for abdominal pain, nausea and vomiting.   Musculoskeletal:  Positive for arthralgias and gait problem.   Skin:  Positive for wound.   Neurological:  Negative for dizziness, speech difficulty, weakness and headaches.   Psychiatric/Behavioral:  Negative for agitation, confusion and hallucinations.    All other systems reviewed and are negative.    Objective:     Vital Signs (Most Recent):  Temp: 97.9 °F (36.6 °C) (11/27/24 1752)  Pulse: 85 (11/27/24 1752)  Resp: 17 (11/27/24 1752)  BP: 138/85 (11/27/24 1751)  SpO2: 98 % (11/27/24 1752) Vital Signs (24h Range):  Temp:  [97.9 °F (36.6 °C)-98.7 °F (37.1 °C)] 97.9 °F (36.6 °C)  Pulse:  [80-88] 85  Resp:  [11-18] 17  SpO2:  [97 %-100 %] 98 %  BP: (107-171)/() 138/85        There is no height or weight on file to calculate BMI.     Physical Exam  Vitals and " "nursing note reviewed.   Constitutional:       General: He is not in acute distress.     Appearance: He is not toxic-appearing.   HENT:      Head: Normocephalic and atraumatic.      Nose: No congestion.      Mouth/Throat:      Mouth: Mucous membranes are moist.      Pharynx: No oropharyngeal exudate or posterior oropharyngeal erythema.   Eyes:      Extraocular Movements: Extraocular movements intact.      Pupils: Pupils are equal, round, and reactive to light.   Cardiovascular:      Rate and Rhythm: Normal rate and regular rhythm.      Heart sounds: No murmur heard.     No friction rub. No gallop.   Pulmonary:      Effort: Pulmonary effort is normal. No respiratory distress.      Breath sounds: No wheezing or rales.   Chest:      Chest wall: No tenderness.   Abdominal:      General: Bowel sounds are normal. There is no distension.      Palpations: Abdomen is soft.      Tenderness: There is no abdominal tenderness. There is no guarding or rebound.   Musculoskeletal:         General: Deformity present. No tenderness.      Cervical back: Neck supple. No rigidity or tenderness.   Skin:     Comments: Dressing in place, no oozing, no wound smelling   Neurological:      Mental Status: He is alert and oriented to person, place, and time.      Motor: No weakness.      Coordination: Coordination normal.      Comments: Left hand deformation and contraction   Psychiatric:         Thought Content: Thought content normal.          Significant Labs: All pertinent labs within the past 24 hours have been reviewed.  A1C:   Recent Labs   Lab 08/08/24  1335 10/16/24  1221   HGBA1C >14.0* 11.0*     Blood Culture: No results for input(s): "LABBLOO" in the last 48 hours.  BMP: No results for input(s): "GLU", "NA", "K", "CL", "CO2", "BUN", "CREATININE", "CALCIUM", "MG" in the last 48 hours.  CBC: No results for input(s): "WBC", "HGB", "HCT", "PLT" in the last 48 hours.  Troponin: No results for input(s): "TROPONINI", "TROPONINIHS" in the " "last 48 hours.  TSH: No results for input(s): "TSH" in the last 4320 hours.  Urine Culture: No results for input(s): "LABURIN" in the last 48 hours.  Urine Studies: No results for input(s): "COLORU", "APPEARANCEUA", "PHUR", "SPECGRAV", "PROTEINUA", "GLUCUA", "KETONESU", "BILIRUBINUA", "OCCULTUA", "NITRITE", "UROBILINOGEN", "LEUKOCYTESUR", "RBCUA", "WBCUA", "BACTERIA", "SQUAMEPITHEL", "HYALINECASTS" in the last 48 hours.    Invalid input(s): "WRIGHTSUR"  Recent Lab Results         11/27/24  1626   11/27/24  0914        POCT Glucose 113   128               Significant Imaging: I have reviewed all pertinent imaging results/findings within the past 24 hours.  "

## 2024-11-28 LAB
ANION GAP SERPL CALC-SCNC: 11 MMOL/L (ref 8–16)
BASOPHILS # BLD AUTO: 0.01 K/UL (ref 0–0.2)
BASOPHILS NFR BLD: 0.1 % (ref 0–1.9)
BUN SERPL-MCNC: 18 MG/DL (ref 6–20)
CALCIUM SERPL-MCNC: 9.3 MG/DL (ref 8.7–10.5)
CHLORIDE SERPL-SCNC: 106 MMOL/L (ref 95–110)
CO2 SERPL-SCNC: 23 MMOL/L (ref 23–29)
CREAT SERPL-MCNC: 0.9 MG/DL (ref 0.5–1.4)
DIFFERENTIAL METHOD BLD: ABNORMAL
EOSINOPHIL # BLD AUTO: 0 K/UL (ref 0–0.5)
EOSINOPHIL NFR BLD: 0 % (ref 0–8)
ERYTHROCYTE [DISTWIDTH] IN BLOOD BY AUTOMATED COUNT: 14.2 % (ref 11.5–14.5)
EST. GFR  (NO RACE VARIABLE): >60 ML/MIN/1.73 M^2
GLUCOSE SERPL-MCNC: 109 MG/DL (ref 70–110)
HCT VFR BLD AUTO: 32.3 % (ref 40–54)
HGB BLD-MCNC: 9.8 G/DL (ref 14–18)
IMM GRANULOCYTES # BLD AUTO: 0.02 K/UL (ref 0–0.04)
IMM GRANULOCYTES NFR BLD AUTO: 0.3 % (ref 0–0.5)
LYMPHOCYTES # BLD AUTO: 1.4 K/UL (ref 1–4.8)
LYMPHOCYTES NFR BLD: 20.1 % (ref 18–48)
MCH RBC QN AUTO: 27.5 PG (ref 27–31)
MCHC RBC AUTO-ENTMCNC: 30.3 G/DL (ref 32–36)
MCV RBC AUTO: 91 FL (ref 82–98)
MONOCYTES # BLD AUTO: 0.6 K/UL (ref 0.3–1)
MONOCYTES NFR BLD: 8.7 % (ref 4–15)
NEUTROPHILS # BLD AUTO: 5.1 K/UL (ref 1.8–7.7)
NEUTROPHILS NFR BLD: 70.8 % (ref 38–73)
NRBC BLD-RTO: 0 /100 WBC
PLATELET # BLD AUTO: 290 K/UL (ref 150–450)
PMV BLD AUTO: 10.2 FL (ref 9.2–12.9)
POCT GLUCOSE: 139 MG/DL (ref 70–110)
POCT GLUCOSE: 162 MG/DL (ref 70–110)
POCT GLUCOSE: 164 MG/DL (ref 70–110)
POCT GLUCOSE: 174 MG/DL (ref 70–110)
POCT GLUCOSE: 199 MG/DL (ref 70–110)
POTASSIUM SERPL-SCNC: 3.8 MMOL/L (ref 3.5–5.1)
RBC # BLD AUTO: 3.57 M/UL (ref 4.6–6.2)
SODIUM SERPL-SCNC: 140 MMOL/L (ref 136–145)
WBC # BLD AUTO: 7.13 K/UL (ref 3.9–12.7)

## 2024-11-28 PROCEDURE — 11000001 HC ACUTE MED/SURG PRIVATE ROOM: Mod: HCNC

## 2024-11-28 PROCEDURE — 36415 COLL VENOUS BLD VENIPUNCTURE: CPT | Mod: HCNC | Performed by: PODIATRIST

## 2024-11-28 PROCEDURE — 25000003 PHARM REV CODE 250: Mod: HCNC | Performed by: PODIATRIST

## 2024-11-28 PROCEDURE — 97116 GAIT TRAINING THERAPY: CPT | Mod: HCNC | Performed by: PHYSICAL THERAPIST

## 2024-11-28 PROCEDURE — 80048 BASIC METABOLIC PNL TOTAL CA: CPT | Mod: HCNC | Performed by: PODIATRIST

## 2024-11-28 PROCEDURE — 63600175 PHARM REV CODE 636 W HCPCS: Mod: HCNC | Performed by: FAMILY MEDICINE

## 2024-11-28 PROCEDURE — 97165 OT EVAL LOW COMPLEX 30 MIN: CPT | Mod: HCNC

## 2024-11-28 PROCEDURE — 85025 COMPLETE CBC W/AUTO DIFF WBC: CPT | Mod: HCNC | Performed by: PODIATRIST

## 2024-11-28 PROCEDURE — 63600175 PHARM REV CODE 636 W HCPCS: Mod: HCNC | Performed by: INTERNAL MEDICINE

## 2024-11-28 PROCEDURE — 97161 PT EVAL LOW COMPLEX 20 MIN: CPT | Mod: HCNC | Performed by: PHYSICAL THERAPIST

## 2024-11-28 PROCEDURE — 99900035 HC TECH TIME PER 15 MIN (STAT): Mod: HCNC

## 2024-11-28 PROCEDURE — 63600175 PHARM REV CODE 636 W HCPCS: Mod: HCNC | Performed by: PODIATRIST

## 2024-11-28 PROCEDURE — 97530 THERAPEUTIC ACTIVITIES: CPT | Mod: HCNC

## 2024-11-28 PROCEDURE — 25000003 PHARM REV CODE 250: Mod: HCNC | Performed by: INTERNAL MEDICINE

## 2024-11-28 RX ORDER — METRONIDAZOLE 500 MG/1
500 TABLET ORAL EVERY 8 HOURS
Status: DISCONTINUED | OUTPATIENT
Start: 2024-11-28 | End: 2024-12-03 | Stop reason: HOSPADM

## 2024-11-28 RX ORDER — CEFAZOLIN 2 G/1
2 INJECTION, POWDER, FOR SOLUTION INTRAMUSCULAR; INTRAVENOUS
Status: DISCONTINUED | OUTPATIENT
Start: 2024-11-28 | End: 2024-11-28

## 2024-11-28 RX ORDER — VANCOMYCIN 1.75 GRAM/500 ML IN 0.9 % SODIUM CHLORIDE INTRAVENOUS
1750
Status: DISCONTINUED | OUTPATIENT
Start: 2024-11-28 | End: 2024-11-29

## 2024-11-28 RX ORDER — CIPROFLOXACIN 2 MG/ML
400 INJECTION, SOLUTION INTRAVENOUS
Status: DISCONTINUED | OUTPATIENT
Start: 2024-11-28 | End: 2024-11-28

## 2024-11-28 RX ORDER — CEFEPIME HYDROCHLORIDE 1 G/1
2 INJECTION, POWDER, FOR SOLUTION INTRAMUSCULAR; INTRAVENOUS
Status: DISCONTINUED | OUTPATIENT
Start: 2024-11-28 | End: 2024-12-03 | Stop reason: HOSPADM

## 2024-11-28 RX ADMIN — INSULIN GLARGINE 15 UNITS: 100 INJECTION, SOLUTION SUBCUTANEOUS at 09:11

## 2024-11-28 RX ADMIN — METRONIDAZOLE 500 MG: 500 TABLET ORAL at 09:11

## 2024-11-28 RX ADMIN — ENOXAPARIN SODIUM 40 MG: 40 INJECTION SUBCUTANEOUS at 08:11

## 2024-11-28 RX ADMIN — INSULIN GLARGINE 15 UNITS: 100 INJECTION, SOLUTION SUBCUTANEOUS at 12:11

## 2024-11-28 RX ADMIN — CEFAZOLIN 1 G: 330 INJECTION, POWDER, FOR SOLUTION INTRAMUSCULAR; INTRAVENOUS at 04:11

## 2024-11-28 RX ADMIN — CEFEPIME 2 G: 1 INJECTION, POWDER, FOR SOLUTION INTRAMUSCULAR; INTRAVENOUS at 11:11

## 2024-11-28 RX ADMIN — METRONIDAZOLE 500 MG: 500 TABLET ORAL at 02:11

## 2024-11-28 RX ADMIN — INSULIN ASPART 1 UNITS: 100 INJECTION, SOLUTION INTRAVENOUS; SUBCUTANEOUS at 12:11

## 2024-11-28 RX ADMIN — VANCOMYCIN HYDROCHLORIDE 2250 MG: 500 INJECTION, POWDER, LYOPHILIZED, FOR SOLUTION INTRAVENOUS at 11:11

## 2024-11-28 RX ADMIN — INSULIN ASPART 2 UNITS: 100 INJECTION, SOLUTION INTRAVENOUS; SUBCUTANEOUS at 11:11

## 2024-11-28 RX ADMIN — PANTOPRAZOLE SODIUM 40 MG: 40 TABLET, DELAYED RELEASE ORAL at 08:11

## 2024-11-28 RX ADMIN — VANCOMYCIN HYDROCHLORIDE 1750 MG: 500 INJECTION, POWDER, LYOPHILIZED, FOR SOLUTION INTRAVENOUS at 09:11

## 2024-11-28 RX ADMIN — CIPROFLOXACIN 400 MG: 2 INJECTION, SOLUTION INTRAVENOUS at 06:11

## 2024-11-28 RX ADMIN — CEFEPIME 2 G: 1 INJECTION, POWDER, FOR SOLUTION INTRAMUSCULAR; INTRAVENOUS at 06:11

## 2024-11-28 RX ADMIN — INSULIN ASPART 2 UNITS: 100 INJECTION, SOLUTION INTRAVENOUS; SUBCUTANEOUS at 04:11

## 2024-11-28 RX ADMIN — INSULIN ASPART 1 UNITS: 100 INJECTION, SOLUTION INTRAVENOUS; SUBCUTANEOUS at 09:11

## 2024-11-28 NOTE — PROGRESS NOTES
Pharmacist Renal Dose Adjustment Note    Indio Ryder Jr. is a 54 y.o. male being treated with the medication cefazolin     Patient Data:    Vital Signs (Most Recent):  Temp: 97.7 °F (36.5 °C) (11/28/24 0329)  Pulse: 86 (11/28/24 0329)  Resp: 16 (11/28/24 0329)  BP: (!) 153/85 (11/28/24 0329)  SpO2: 95 % (11/28/24 0329) Vital Signs (72h Range):  Temp:  [97.6 °F (36.4 °C)-98.7 °F (37.1 °C)]   Pulse:  []   Resp:  [11-18]   BP: (107-171)/()   SpO2:  [95 %-100 %]      Recent Labs   Lab 11/25/24  1423   CREATININE 0.9     Serum creatinine: 0.9 mg/dL 11/25/24 1423  Estimated creatinine clearance: 113 mL/min    Medication:cefazolin  dose: 1G frequency q8h will be changed to medication:cefazolin dose:2G frequency:q8h    Pharmacist's Name: Isabella Mcarthur  Pharmacist's Extension: [phone]

## 2024-11-28 NOTE — ASSESSMENT & PLAN NOTE
Patients blood pressure range in the last 24 hours was: BP  Min: 107/63  Max: 171/104.The patient's inpatient anti-hypertensive regimen is listed below:  Current Antihypertensives       Plan  - BP is controlled, no changes needed to their regimen  - cont home meds

## 2024-11-28 NOTE — OP NOTE
[facility] Surg  Operative Note      Date of Procedure: 11/27/2024     Procedure: Procedure(s) (LRB):  IRRIGATION AND DEBRIDEMENT of osteomyelitic bone (Left) foot  OPEN BONE BIOPSY LEFT FOOT CALCANEUS  3.   IMPLANTATION OF ANTIBIOTIC ELUDING CEMENT AND BIOACTIVE GLASS LEFT FOOT  4.   FUSION OF TALONAVICULAR JOINT LEFT FOOT WITH PERCUTANEOUS PINNING  5.   EXOSTECTOMY LEFT ANKLE  6.   ACHILLES TENDON LENGTHENING LEFT  7.   APPLICATION, MULTIPLE PLANE EXTERNAL FIXATION SYSTEM LEFT LOWER EXTREMITY     Surgeons and Role:     * Daniel Reyes DPM - Primary    Assisting Surgeon:    Ida Rudd DPM - 1st AssTwyla Lockwood DPM - 2nd Assist    Pre-Operative Diagnosis: Chronic multifocal osteomyelitis of left foot [M86.372]  Subluxation of tarsal joint of foot, left, sequela [S93.312S]  Type 2 diabetes mellitus with Charcot joint arthropathy [E11.610]  Equinus contracture of left ankle [M24.572]  Exostosis of bone of ankle [M89.8X7]  Acquired varus deformity of foot, left [M21.172]    Post-Operative Diagnosis: Post-Op Diagnosis Codes:     * Chronic multifocal osteomyelitis of left foot [M86.372]     * Subluxation of tarsal joint of foot, left, sequela [S93.312S]     * Type 2 diabetes mellitus with Charcot joint arthropathy [E11.610]     * Equinus contracture of left ankle [M24.572]     * Exostosis of bone of ankle [M89.8X7]     * Acquired varus deformity of foot, left [M21.172]    Procedure(s) (LRB):  IRRIGATION AND DEBRIDEMENT of osteomyelitic bone (Left) foot  OPEN BONE BIOPSY LEFT FOOT CALCANEUS  3.   IMPLANTATION OF ANTIBIOTIC ELUDING CEMENT AND BIOACTIVE GLASS LEFT FOOT  4.   FUSION OF TALONAVICULAR JOINT LEFT FOOT WITH PERCUTANEOUS PINNING  5.   EXOSTECTOMY LEFT ANKLE  6.   ACHILLES TENDON LENGTHENING LEFT  7.   APPLICATION, MULTIPLE PLANE EXTERNAL FIXATION SYSTEM LEFT LOWER EXTREMITY    Anesthesia: General/Regional    Operative Findings (including complications, if any):   necrotic and non-viable  appearing cuboid bone that was fully excised. Calcaneus aggressively prepped to firm appearing bone in addition to surrounding second and third metatarsal bases. TNJ directly visualized and noted direct extension to joint so it was also prepped for fusion. Biopsied clean residual margin of the calcaneus after irrigation with separate clean rongeur. Ex-fix applied to stabilize bone and hold reduction of ankle     Description of Technical Procedures:   Patient was brought to the operating room on a stretcher and transferred to the OR table in supine position. Anesthesia was induced. Tourniquet was applied to the L thigh.The L leg was draped and prepped in a sterile manner.      Attention was first directed to the L distal leg. The lower limb was measured for the planned external fixation construct. 160 mm rings x 3 spanned by threaded rods composed the tibial ring block.  A 5 mm self-drilling half pin was then placed along the medial face of the tibia proximal the tibial block and secured to a 2 hole post which was securing the external fixator maintaining the reduction of the limb within tibial component rings.   The tibial component rings were then secured with axially placed smooth wires driven from medial to lateral above each ring.  Medial face smooth wires were placed beginning lateral to the tibial crest to exit posterior medial along the tibia along the inferior aspect of each ring to secure the frame near 45° to the other smooth wires. Tensioning of the smooth wires was then completed 130 lb.  Fluoroscopic guidance was utilized to verify appropriate wire placement.  All wires were then bent and cut flush with the wire fixation bolt.      The L lower extremity was exsanguinated using an esmark, elevated for 1 minute, and the thigh tourniquet was inflated to 300 mm Hg.    Attention was then directed to the dorsal lateral L hindfoot.  Using a #15 scalpel an incision was made over the calcaneal cuboid joint  beginning at the lateral malleolus and directed towards the third metatarsal base.  Care was taken with blunt dissection into the subcutaneous tissue to preserve any tendons and were retracted away safely.  Monopolar bovie was used to cauterize any bleeders as necessary.  Incision was then deepened down to bone. Moderate amount of serous drainage was noted.  The cuboid bone was noted to be necrotic and nonviable and was fully excised by isolating it with a humphrey elevator in addition to using a rongeur, and a #15 scalpel. Extensive amounts of hypertrophic synovial tissue and granular tissue were noted and excised within the surgical field. The talonavicular joint was directly visualized and was noted to be firm and viable.  All soft and dystrophic bone were debrided and excised. The Cartilaginator was used to denude cartilage from the talonavicular joint, anterior calcaneus and exposed metatarsal bases. Pulsed lavage with 3L NS used to irrigate surgical site..  With a clean rongeur bone cultures were collected from the calcaneus and sent to the lab for further evaluation. 20 cc of Bioactive glass was applied to fill the void of the previously excised cuboid bone and the tarsal metatarsal joints.  Stimulan antibiotic beads mixed with tobramycin and vancomycin were also applied within the surgical site around the periphery.  Deep subcutaneous closure with 3-0 Monocryl and superficially with 4-0 Prolene.    Attention was then directed to the anterior L ankle joint.  With manual manipulation of the ankle it was noted that there was limited dorsiflexion.  With a #15 scalpel incision was made through the skin down to subcutaneous tissue.  Care was taken to preserve vital neurovascular and tendons and retracted away safely.  Further blunt dissection down to bone.  It was noted that there was a bony prominence to the distal tibia that was restricting dorsiflexion at the tibia and talus joint.  Using an osteotome and a mallet  the bony prominence was removed.  With the completion of the exostectomy of the bony prominence on the tibia there was an improvement in dorsiflexion at the the tibia talar joint.  Deep subcutaneous closure with 3-0 Monocryl and superficially with 4 0 Prolene.    While manually manipulating and reducing the foot into a rectus position a 2.4 mm Steinmann pin was was driven from the 1st metatarsal head beginning underneath the hallux to the head of the talus securing the medial column.  A 2nd Steinmann was then driven between the 2nd and 3rd metatarsal bases that continue into the previously excised cuboid deficit that was filled with bioactive glass and then proximally to the calcaneus while the foot was still manually manipulated in a rectus position.  Fluoroscopy confirmed that Steinmann pins were in adequate position.      Attention was then directed to the the posterior L achilles.  A skin marker was used to draw a planned incision guide line 3 cm, 6 cm , and 9 cm, along the Achilles tendon, from its insertional site. Using a #15 scalpel a small incision made medially at the 3 cm and 9 cm at the previously drawn incision guide line and laterally at the 6 cm incision guide line.  Adequate release and lengthening of the achilles tendon was noted with dorsiflexion of the L foot.    Attention was than directed to the L foot.  An axial smooth wire was then placed perpendicular to the calcaneus through the calcaneal tuber.  160 mm double row footplate was then secured to the wire.  Olive wires were then driven through the midfoot capturing the navicular and directed towards the distal lateral aspect of the forefoot another wire was driven from the lateral aspect of the foot capturing the metatarsal bases and directed distal medial and also secured to the footplate.  Another smooth wire was placed from the posterior medial aspect of the calcaneus and directed distal lateral and secured to the footplate.  The forefoot  wires were then tensioned to 50 lb and the wires in the calcaneus to 130 lb.  Overall maintained reduction of the foot was noted. All wires were then bent and cut flush with the wire fixation bolt. The previous driven half pin in the tibia was removed in its entirety and the incision was closed with 4-0 prolene. Four rapid adjust struts were then utilized to connect the distal ring from the tibial component to the footplate and the foot was dorsiflexed to reduce the equinus. The rapid adjust struts were secured to maintain the reduction. Proper verification of all hardware and reduction was noted clinically and confirmed with final shots using fluoroscopy.  Rocker rales were also applied to the bottom of the footplate.     The foot was cleansed with saline and peroxide. The pin sites were dressed with xeroform, 2x2 gauze, and surgical sites were dressed with xeroform, and 4x4 gauze, the L lower extremity was dressed with catarino and 2 in ACE wraps.     The patient tolerated the procedure and anesthesia well. Patient was transferred to the recovery room with vital signs stable, vascular status intact, and capillary refill time <3 seconds to the distal L foot. Following a period of post-op monitoring, the patient will admitted to the hospital and placed in inpatient hospital room.    Estimated Blood Loss (EBL): 250 mL           Implants:   Implant Name Type Inv. Item Serial No.  Lot No. LRB No. Used Action   [employer]    ORTHOFIX  Left 1 Implanted   TL PLUS FULL RING, 160MM    ORTHOFIX  Left 3 Implanted   TL, BOLT, 12MM    ORTHOFIX  Left 2 Implanted   TL 8MM  HALF PIN BOLT    ORTHOFIX  Left 1 Implanted   TL, NUT STAINLESS STEEL, 10MM    ORTHOFIX  Left 6 Implanted   TL, BOLT, WIRE FIXATION, UNIVERSAL    ORTHOFIX  Left 12 Implanted   TL, NUT STAINLESS STEEL, 10MM, QTY, 20    ORTHOFIX  Left 1 Implanted   TL+ TWO HOLE POST    ORTHOFIX  Left 1 Implanted   STOPPER, RED RUBBER, WIRE    ORTHOFIX  Left  16 Implanted   TL+ THREADED HEX END LUBNA 200MM    ORTHOFIX  Left 4 Implanted   WIRE TRUELOK BAYONET [id number] - [id number]  WIRE TRUELOK BAYONET [id number]  ORTHOFIX  Left 8 Implanted   WIRE BAYONET W/ STPR [id number] - [id number]  WIRE BAYONET W/ STPR [id number]  ORTHOFIX  Left 2 Implanted   [employer]     BS-23/22/8 Left 2 Implanted   [employer] nut with washer    ORTHOFIX [id number] Left 2 Implanted   [employer]ru[employer] Jordon RX Strut medium    ORTHOFIX [id number] Left 1 Implanted   [employer] Jordon RX Strut medium    ORTHOFIX [id number] Left 1 Implanted   [employer] Jordon Wire Fixation bolt    ORTHOFIX [id number] Left 1 Implanted   Tru[employer] Jordon RXfoot arch    ORTHOFIX [id number] Left 1 Implanted   [employer]ru[employer] Jordon RX foot plate    ORTHOFIX [id number] Left 1 Implanted   [employer] Jordon Wire fixation bolt    ORTHOFIX [id number] Left 1 Implanted   Truelock Jordon RX Strut medium    ORTHOFIX [id number] Left 2 Implanted   Truelock Jordon Wire fixation bolt    ORTHOFIX [id number] Left 1 Implanted   TL PLuswasher 4mm      Left 2 Implanted       Specimens:   Specimen (24h ago, onward)       Start     Ordered    11/27/24 1453  Specimen to Pathology, Surgery Orthopedics  Once        Comments: Pre-op Diagnosis: Chronic multifocal osteomyelitis of left foot [M86.372]Subluxation of tarsal joint of foot, left, sequela [S93.312S]Type 2 diabetes mellitus with Charcot joint arthropathy [E11.610]Equinus contracture of left ankle [M24.572]Exostosis of bone of ankle [M89.8X7]Acquired varus deformity of foot, left [M21.172]Procedure(s):IRRIGATION AND DEBRIDEMENTAPPLICATION, EXTERNAL FIXATION SYSTEM, MULTIPLANAR, WITH IMAGING GUIDANCE Number of specimens: 1Name of specimens: 1. Left foot Calcaneous- perm     References:    Click here for ordering Quick Tip   Question Answer Comment   Procedure Type: Orthopedics    Specimen Class: Routine/Screening    Release to patient Immediate        11/27/24 1453    11/27/24 1410  Specimen to Pathology, Surgery Orthopedics  Once,    Status:  Canceled        Comments: Pre-op Diagnosis: Chronic multifocal osteomyelitis of left foot [M86.372]Subluxation of tarsal joint of foot, left, sequela [S93.312S]Type 2 diabetes mellitus with Charcot joint arthropathy [E11.610]Equinus contracture of left ankle [M24.572]Exostosis of bone of ankle [M89.8X7]Acquired varus deformity of foot, left [M21.172]Procedure(s):IRRIGATION AND DEBRIDEMENTAPPLICATION, EXTERNAL FIXATION SYSTEM, MULTIPLANAR, WITH IMAGING GUIDANCE Number of specimens: 1Name of specimens: 1. Left foot Calcaneous- perm     References:    Click here for ordering Quick Tip   Question Answer Comment   Procedure Type: Orthopedics    Specimen Class: Routine/Screening    Release to patient Immediate        11/27/24 1410                            Condition: Good    Disposition: PACU - hemodynamically stable.    Attestation: I was present and scrubbed for the entire procedure.    I directly supervised the above resident and was scrubbed for the entire surgical case.  Daniel Reyes DPM, FACFAS

## 2024-11-28 NOTE — CONSULTS
[facility]  Consult Note    Patient Name: Indio Ryder Jr.  MRN: [id number]  Admission Date: 11/27/2024  Hospital Length of Stay: 0 days  Attending Physician: Daniel Reyes DPM   Primary Care Provider: Lorena Thakur MD           Patient information was obtained from patient and ER records.     Consults  Subjective:     Principal Problem: Chronic osteomyelitis of left foot    Chief Complaint:  Foot pain    HPI:   I have been asked by Dr Reyes to see Mr Britni Borjas for medical management s/p IMPLANTATION OF ANTIBIOTIC ELUDING CEMENT AND BIOACTIVE GLASS LEFT FOOT, OPEN BONE BIOPSY LEFT FOOT CALCANEUS, ACHILLES TENDON LENGTHENING LEFT. APPLICATION, MULTIPLE PLANE EXTERNAL FIXATION SYSTEM LEFT LOWER EXTREMITY, EXOSTECTOMY LEFT ANKLE, IRRIGATION AND DEBRIDEMENT of osteomyelitic bone (Left) foot for Chronic multifocal osteomyelitis of left foot, Subluxation of tarsal joint of foot, left, sequela, Acquired varus deformity of foot, left. Patient is 53 YO M with PMHx of diabetes Melitus, with complication of charcot foot, subacute osteomyelitis right foot, HTN, HLD, colon adenocarcinoma. Patient seen stated since having this foot infection his diabetes has not been well controlled with last A1C on 10/24 was 11. Stated post procedure he's feeling okay, denies foot pain wound dressing clean, Ex-fix applied to stabilize bone and hold reduction of ankle. Denies fever no CP or SOB.    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    Right foot    Colon adenocarcinoma 04/12/2022    Diabetic ketoacidosis without coma associated with type 2 diabetes mellitus 05/30/2017    Diabetic ulcer of right foot associated with type 2 diabetes mellitus 06/03/2015    Disorder of kidney and ureter     Essential hypertension 06/06/2013    Group B streptococcal infection 01/13/2017    Mixed hyperlipidemia 08/12/2014    Septic arthritis of left foot 04/28/2021    Shoulder  impingement 08/12/2014    Subacute osteomyelitis of right foot 01/12/2017    Streptococcus agalactiae, Actinomyces odontolyticus    Traumatic amputation of fifth toe of right foot 07/02/2015    Type 2 diabetes mellitus with diabetic neuropathy, with long-term current use of insulin 05/03/2016       Past Surgical History:   Procedure Laterality Date    COLONOSCOPY Right 1/19/2022    Procedure: COLONOSCOPY;  Surgeon: Tj Haile MD;  Location: [facility] ENDO (4TH FLR);  Service: Endoscopy;  Laterality: Right;  previous order un-usable/poor prep 1/18/22  RAPID COVID test arrival 12:20  instructions handed to pt-     COLONOSCOPY N/A 3/21/2022    Procedure: COLONOSCOPY;  Surgeon: Sheng Haque MD;  Location: [facility] ENDO (2ND FLR);  Service: Endoscopy;  Laterality: N/A;  Colonoscopy with AES for hepatix flexure polyp removal, 2nd floor  Pt is fully vaccinated-DS  Pt prescribed Plavix by Dr. Stahl in Jan. 2022, however pt states that he never started taking it-DS  3/16/22-Instructions sent via portal-DS    COLONOSCOPY N/A 9/13/2023    Procedure: COLONOSCOPY;  Surgeon: Erik Stout MD;  Location: [facility] ENDO (4TH FLR);  Service: Colon and Rectal;  Laterality: N/A;  Referred by Dr. Stout, timur, [facility], [place] -    DEBRIDEMENT OF FOOT Left 7/14/2019    Procedure: DEBRIDEMENT, FOOT, with left 5th ray amputation;  Surgeon: Sis Hickman DPM;  Location: [facility] OR 2ND FLR;  Service: Podiatry;  Laterality: Left;    DEBRIDEMENT OF FOOT Left 7/17/2019    Procedure: DEBRIDEMENT, FOOT with leftt 5th ray partial amputation with Neox Graft;  Surgeon: Mai Burrell DPM;  Location: [facility] OR 2ND FLR;  Service: Podiatry;  Laterality: Left;  t    DEBRIDEMENT OF FOOT Bilateral 4/29/2021    Procedure: DEBRIDEMENT, FOOT;  Surgeon: Mai Burrell DPM;  Location: [facility] OR 1ST FLR;  Service: Podiatry;  Laterality: Bilateral;  Graft application    DEBRIDEMENT OF FOOT Left 7/31/2023    Procedure: DEBRIDEMENT, FOOT;   "Surgeon: Marivel Peterson DPM;  [facility];  Service: Podiatry;  Laterality: Left;    TOE AMPUTATION Right 06/05/2015    TOE AMPUTATION Right 01/19/2017    XI ROBOTIC COLECTOMY, RIGHT N/A 4/25/2022    Procedure: XI ROBOTIC COLECTOMY, RIGHT (lithotomy, eras low);  Surgeon: Erik Stout MD;  [facility];  Service: Colon and Rectal;  Laterality: N/A;  Paruch to Goodwin    XI ROBOTIC COLECTOMY, RIGHT  4/25/2022    Procedure: ;  Surgeon: Erik Stout MD;  [facility];  Service: Colon and Rectal;;       Review of patient's allergies indicates:  No Known Allergies    No current facility-administered medications on file prior to encounter.     Current Outpatient Medications on File Prior to Encounter   Medication Sig    empagliflozin (JARDIANCE) 25 mg tablet Take 1 tablet (25 mg total) by mouth once daily.    blood sugar diagnostic Strp use to test blood sugar 4 times daily    blood-glucose meter Misc Use as instructed    ciprofloxacin HCl (CIPRO) 750 MG tablet Take 1 tablet (750 mg total) by mouth every 12 (twelve) hours.    insulin aspart U-100 (NOVOLOG) 100 unit/mL (3 mL) InPn pen Inject if sugar is > 180 up to 4x a day , 180-230+2, 231-280+4, 281-330+6, 331-380+8, >380+10. Max daily 40 units    insulin glargine U-300 conc (TOUJEO MAX U-300 SOLOSTAR) 300 unit/mL (3 mL) insulin pen Inject 26 to 32 units under the skin at night    lancets 33 gauge Misc 1 lancet  by Misc.(Non-Drug; Combo Route) route 4 (four) times daily.    pantoprazole (PROTONIX) 40 MG tablet Take 1 tablet (40 mg total) by mouth once daily.    pen needle, diabetic (BD SASCHA 2ND GEN PEN NEEDLE) 32 gauge x 5/32" Ndle Use 4 times a day     Family History       Problem Relation (Age of Onset)    Cancer Mother    Cataracts Father    Diabetes Mother, Sister    Heart disease Father, Sister    Hypertension Mother, Father, Maternal Aunt    No Known Problems Brother, Sister, Maternal Uncle, Paternal Aunt, " Paternal Uncle, Maternal Grandmother, Maternal Grandfather, Paternal Grandmother, Paternal Grandfather          Tobacco Use    Smoking status: Never    Smokeless tobacco: Never   Substance and Sexual Activity    Alcohol use: No    Drug use: No    Sexual activity: Yes     Partners: Female     Birth control/protection: Condom     Review of Systems   Constitutional:  Positive for activity change. Negative for appetite change, chills and fever.   Respiratory:  Negative for cough, chest tightness, shortness of breath and wheezing.    Cardiovascular:  Negative for chest pain and palpitations.   Gastrointestinal:  Negative for abdominal pain, nausea and vomiting.   Musculoskeletal:  Positive for arthralgias and gait problem.   Skin:  Positive for wound.   Neurological:  Negative for dizziness, speech difficulty, weakness and headaches.   Psychiatric/Behavioral:  Negative for agitation, confusion and hallucinations.    All other systems reviewed and are negative.    Objective:     Vital Signs (Most Recent):  Temp: 97.9 °F (36.6 °C) (11/27/24 1752)  Pulse: 85 (11/27/24 1752)  Resp: 17 (11/27/24 1752)  BP: 138/85 (11/27/24 1751)  SpO2: 98 % (11/27/24 1752) Vital Signs (24h Range):  Temp:  [97.9 °F (36.6 °C)-98.7 °F (37.1 °C)] 97.9 °F (36.6 °C)  Pulse:  [80-88] 85  Resp:  [11-18] 17  SpO2:  [97 %-100 %] 98 %  BP: (107-171)/() 138/85        There is no height or weight on file to calculate BMI.     Physical Exam  Vitals and nursing note reviewed.   Constitutional:       General: He is not in acute distress.     Appearance: He is not toxic-appearing.   HENT:      Head: Normocephalic and atraumatic.      Nose: No congestion.      Mouth/Throat:      Mouth: Mucous membranes are moist.      Pharynx: No oropharyngeal exudate or posterior oropharyngeal erythema.   Eyes:      Extraocular Movements: Extraocular movements intact.      Pupils: Pupils are equal, round, and reactive to light.   Cardiovascular:      Rate and Rhythm:  "Normal rate and regular rhythm.      Heart sounds: No murmur heard.     No friction rub. No gallop.   Pulmonary:      Effort: Pulmonary effort is normal. No respiratory distress.      Breath sounds: No wheezing or rales.   Chest:      Chest wall: No tenderness.   Abdominal:      General: Bowel sounds are normal. There is no distension.      Palpations: Abdomen is soft.      Tenderness: There is no abdominal tenderness. There is no guarding or rebound.   Musculoskeletal:         General: Deformity present. No tenderness.      Cervical back: Neck supple. No rigidity or tenderness.   Skin:     Comments: Dressing in place, no oozing, no wound smelling   Neurological:      Mental Status: He is alert and oriented to person, place, and time.      Motor: No weakness.      Coordination: Coordination normal.      Comments: Left hand deformation and contraction   Psychiatric:         Thought Content: Thought content normal.          Significant Labs: All pertinent labs within the past 24 hours have been reviewed.  A1C:   Recent Labs   Lab 08/08/24  1335 10/16/24  1221   HGBA1C >14.0* 11.0*     Blood Culture: No results for input(s): "LABBLOO" in the last 48 hours.  BMP: No results for input(s): "GLU", "NA", "K", "CL", "CO2", "BUN", "CREATININE", "CALCIUM", "MG" in the last 48 hours.  CBC: No results for input(s): "WBC", "HGB", "HCT", "PLT" in the last 48 hours.  Troponin: No results for input(s): "TROPONINI", "TROPONINIHS" in the last 48 hours.  TSH: No results for input(s): "TSH" in the last 4320 hours.  Urine Culture: No results for input(s): "LABURIN" in the last 48 hours.  Urine Studies: No results for input(s): "COLORU", "APPEARANCEUA", "PHUR", "SPECGRAV", "PROTEINUA", "GLUCUA", "KETONESU", "BILIRUBINUA", "OCCULTUA", "NITRITE", "UROBILINOGEN", "LEUKOCYTESUR", "RBCUA", "WBCUA", "BACTERIA", "SQUAMEPITHEL", "HYALINECASTS" in the last 48 hours.    Invalid input(s): "WRIGHTSUR"  Recent Lab Results         11/27/24  1627   " 11/27/24  0914        POCT Glucose 113   128               Significant Imaging: I have reviewed all pertinent imaging results/findings within the past 24 hours.  Assessment/Plan:     * Chronic osteomyelitis of left foot  IMPLANTATION OF ANTIBIOTIC ELUDING CEMENT AND BIOACTIVE GLASS LEFT FOOT    OPEN BONE BIOPSY LEFT FOOT CALCANEUS    IRRIGATION AND DEBRIDEMENT of osteomyelitic bone (Left) foot  Defer to podiatry  F/U ID recommendation  Cont IV Abx with cefazolin and cipro      Type 2 diabetes mellitus with Charcot joint arthropathy  Monitor FS, cont ISS      Subacute osteomyelitis of left foot  Defer to podiatry and ID  Cont current ABx      Type 2 diabetes mellitus with hyperglycemia, with long-term current use of insulin        Mixed hyperlipidemia  Cont statin      Essential hypertension  Patients blood pressure range in the last 24 hours was: BP  Min: 107/63  Max: 171/104.The patient's inpatient anti-hypertensive regimen is listed below:  Current Antihypertensives       Plan  - BP is controlled, no changes needed to their regimen  - cont home meds      VTE Risk Mitigation (From admission, onward)           Ordered     enoxaparin injection 40 mg  Daily         11/27/24 1605     IP VTE HIGH RISK PATIENT  Once         11/27/24 1605     Place sequential compression device  Until discontinued         11/27/24 1605                  Time spent > 45 min      Thank you for your consult. I will follow-up with patient. Please contact us if you have any additional questions.    Agustín Garza MD  Department of Hospital Medicine   [facility] [place] [facility]       Yes...

## 2024-11-28 NOTE — PT/OT/SLP EVAL
Occupational Therapy   Evaluation/tx    Name: Indio Ryder Jr.  MRN: [id number]  Admitting Diagnosis: Chronic osteomyelitis of left foot  Recent Surgery: Procedure(s) (LRB):  IRRIGATION AND DEBRIDEMENT (Left)  APPLICATION, EXTERNAL FIXATION SYSTEM, MULTIPLANAR, WITH IMAGING GUIDANCE (Left) 1 Day Post-Op    Recommendations:     Discharge Recommendations:    Discharge Equipment Recommendations:  bath bench, other (see comments), wheelchair (reacher)  Barriers to discharge:  Decreased caregiver support    Assessment:   Pt presents w/ decreased overall endurance/conditioning, balance/mobility & coordination w/ subsequent decline in (I)/safety w/ BADLs, fxnl mobility & fxnl t/f's.  OT 3x/wk to increase phys/fxnl status & maximize potential to achieve established goals for d/c-->low intensity tx w/ rec TTB & reacher.    Indio Ryder Jr. is a 54 y.o. male with a medical diagnosis of Chronic osteomyelitis of left foot.  He presents with performance deficits affecting function: weakness, impaired endurance, impaired sensation, impaired self care skills, impaired functional mobility, gait instability, impaired balance, decreased coordination, decreased upper extremity function, decreased lower extremity function, decreased safety awareness, decreased ROM, impaired skin, edema, orthopedic precautions.      Rehab Prognosis: Good; patient would benefit from acute skilled OT services to address these deficits and reach maximum level of function.       Plan:     Patient to be seen 3 x/week to address the above listed problems via self-care/home management, therapeutic activities, therapeutic exercises  Plan of Care Expires: 12/28/24  Plan of Care Reviewed with: patient    Subjective     Chief Complaint: NWBing status  Patient/Family Comments/goals: return home    Occupational Profile:  Living Environment: w/ sister in [facility] w/ 0STE; t/s w/ GB & [facility]  Previous level of function: MI via knee scooter  Roles and Routines: light  IADLs  Equipment Used at Home: other (see comments), grab bar, shower chair (knee scooter)  Assistance upon Discharge: sister    Pain/Comfort:  Pain Rating 1: 0/10  Pain Rating Post-Intervention 1: 0/10    Patients cultural, spiritual, Oriental orthodox conflicts given the current situation:      Objective:     Communicated with: nsg prior to session.  Patient found supine with bed alarm, external fixator, peripheral IV upon OT entry to room.    General Precautions: Standard, fall  Orthopedic Precautions: LLE non weight bearing  Braces:    Respiratory Status: Room air    Occupational Performance:    Bed Mobility:    Patient completed Supine to Sit with supervision and stand by assistance    Functional Mobility/Transfers:  Patient completed Sit <> Stand Transfer with contact guard assistance  with  rolling walker   Functional Mobility: see tx note below    Activities of Daily Living:  Lower Body Dressing: minimum assistance don R sock    Cognitive/Visual Perceptual:  AO4    No signif deficits noted    Physical Exam:  RUE WFL at 5/5  L shldr to ~90*; atrophy at thenar webspace & incomplete terminal ext of IPs  **recommended out-pt neurologist consult to pt    Sit balance: G  Stand balance: F-    Sensation: diminished B feet/hands  Coordination: decreased LUE & B feet  Endurance: G for tx tasks    AMPAC 6 Click ADL:  AMPAC Total Score: 20    Treatment & Education:  Pt found in supine & agreeable to OT eval/tx this date.  Pt w/o c/o pain & perf the following:  -sup-->EOB w/ S-SBA & maintained w/ Sup  -standing via RW w/ EOB partially elevated 2/2 pt's tall stature w/ CGA for hopping laterally to R w/ Min A for DME mgmt  -returned to sitting EOB w/ CGA  Edu/tx re: ortho precautions, rec TTB, general safety techs & HEP. Pt verbalized understanding.  Pt left sitting EOB w/ PT.        Patient left sitting edge of bed with all lines intact, call button in reach, nsg notified, and PT present    GOALS:   Multidisciplinary Problems        Occupational Therapy Goals          Problem: Occupational Therapy    Goal Priority Disciplines Outcome Interventions   Occupational Therapy Goal     OT, PT/OT Progressing    Description: Goals to be met by: 12/28     Patient will increase functional independence with ADLs by performing:    LE Dressing with Stand-by Assistance and Assistive Devices as needed.  Toileting from toilet with Stand-by Assistance for hygiene and clothing management.   Toilet transfer to toilet with Stand-by Assistance and maintaining weight-bearing precaution(s).                         History:     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    Right foot    Colon adenocarcinoma 04/12/2022    Diabetic ketoacidosis without coma associated with type 2 diabetes mellitus 05/30/2017    Diabetic ulcer of right foot associated with type 2 diabetes mellitus 06/03/2015    Disorder of kidney and ureter     Essential hypertension 06/06/2013    Group B streptococcal infection 01/13/2017    Mixed hyperlipidemia 08/12/2014    Septic arthritis of left foot 04/28/2021    Shoulder impingement 08/12/2014    Subacute osteomyelitis of right foot 01/12/2017    Streptococcus agalactiae, Actinomyces odontolyticus    Traumatic amputation of fifth toe of right foot 07/02/2015    Type 2 diabetes mellitus with diabetic neuropathy, with long-term current use of insulin 05/03/2016         Past Surgical History:   Procedure Laterality Date    COLONOSCOPY Right 1/19/2022    Procedure: COLONOSCOPY;  Surgeon: Tj Haile MD;  Location: [facility] (4TH FLR);  Service: Endoscopy;  Laterality: Right;  previous order un-usable/poor prep 1/18/22  RAPID COVID test arrival 12:20  instructions handed to pt-     COLONOSCOPY N/A 3/21/2022    Procedure: COLONOSCOPY;  Surgeon: Sheng Haque MD;  Location: [facility] (2ND FLR);  Service: Endoscopy;  Laterality: N/A;  Colonoscopy with AES for hepatix flexure polyp removal, 2nd floor  Pt is  fully vaccinated-DS  Pt prescribed Plavix by Dr. Stahl in Jan. 2022, however pt states that he never started taking it-DS  3/16/22-Instructions sent via portal-DS    COLONOSCOPY N/A 9/13/2023    Procedure: COLONOSCOPY;  Surgeon: Erik Stout MD;  Location: [facility] (4TH FLR);  Service: Colon and Rectal;  Laterality: N/A;  Referred by Dr. Stout, timur, [facility], portal -ml    DEBRIDEMENT OF FOOT Left 7/14/2019    Procedure: DEBRIDEMENT, FOOT, with left 5th ray amputation;  Surgeon: Sis Hickman DPM;  Location: [facility] OR 2ND FLR;  Service: Podiatry;  Laterality: Left;    DEBRIDEMENT OF FOOT Left 7/17/2019    Procedure: DEBRIDEMENT, FOOT with leftt 5th ray partial amputation with Neox Graft;  Surgeon: Mai Burrell DPM;  Location: [facility] OR 2ND FLR;  Service: Podiatry;  Laterality: Left;  t    DEBRIDEMENT OF FOOT Bilateral 4/29/2021    Procedure: DEBRIDEMENT, FOOT;  Surgeon: Mai Burrell DPM;  Location: [facility] OR 1ST FLR;  Service: Podiatry;  Laterality: Bilateral;  Graft application    DEBRIDEMENT OF FOOT Left 7/31/2023    Procedure: DEBRIDEMENT, FOOT;  Surgeon: Marivel Peterson DPM;  Location: [facility] OR 2ND FLR;  Service: Podiatry;  Laterality: Left;    TOE AMPUTATION Right 06/05/2015    TOE AMPUTATION Right 01/19/2017    XI ROBOTIC COLECTOMY, RIGHT N/A 4/25/2022    Procedure: XI ROBOTIC COLECTOMY, RIGHT (lithotomy, eras low);  Surgeon: Erik Stout MD;  [facility];  Service: Colon and Rectal;  Laterality: N/A;  Paruch to Goodwin    XI ROBOTIC COLECTOMY, RIGHT  4/25/2022    Procedure: ;  Surgeon: Erik Stout MD;  Location: [facility] OR 2ND FLR;  Service: Colon and Rectal;;       Time Tracking:     OT Date of Treatment: 11/28/24  OT Start Time: 0849  OT Stop Time: 0912  OT Total Time (min): 23 min    Billable Minutes:Evaluation 10  Therapeutic Activity 13  Total Time 23    11/28/2024

## 2024-11-28 NOTE — ASSESSMENT & PLAN NOTE
POD#1: Pending intra-op bone c&s and pathology from the calcaneus. The cuboid was excised and bioactive glass/antibiotic eluding cement implanted. Ex-fix left lower extremity well aligned and intact. IV antibiotic therapy per ID recommendations. Anticipate 3-4 days prior to final c&s and antibiotic recommendations made. Will need HH upon discharge with possible PICC line. NWB left lower extremity for now but may apply partial weight for transfers. Elevate above heart level at rest.

## 2024-11-28 NOTE — PT/OT/SLP EVAL
Physical Therapy Evaluation    Patient Name:  Indio Ryder Jr.   MRN:  [id number]    Recommendations:     Discharge Recommendations: Low Intensity Therapy (LIT after removal of hardware)   Discharge Equipment Recommendations: crutches, wheelchair   Barriers to discharge: None    Assessment:     Indio Ryder Jr. is a 54 y.o. male admitted with a medical diagnosis of Chronic osteomyelitis of left foot.  He presents with the following impairments/functional limitations: weakness, impaired endurance, impaired self care skills, impaired functional mobility, gait instability, impaired balance, decreased coordination, decreased lower extremity function, decreased safety awareness Patient was seated EOB when OT arrived. He had tried using a walker and did not feel comfortable and felt like he had difficulty maneuvering his left lower extremity within the walker and wanted to try crutches. Crutches obtained and educated patient on WB status and how to safely use the crutches to assist in standing / sitting. Patient was able to stand with Min A and needed assistance placing the crutches un proper place bilaterally. He was then able to walk 2X ~5 feet to the door and back with Min A, as he had challenges with swinging of the LE and had some posterior balance challenges that were easily corrected with standing rest breaks. He was able to return safely to seated position on EOB with CGA for hand placement. He returned to a supine position CGA. LIT recommended after next procedure for hardware removal, Needs crutches while hardware remains on ankle, as he cannot likely use the knee scooter secondary to hardware placement. .    Rehab Prognosis: Good; patient would benefit from acute skilled PT services to address these deficits and reach maximum level of function.    Recent Surgery: Procedure(s) (LRB):  IRRIGATION AND DEBRIDEMENT (Left)  APPLICATION, EXTERNAL FIXATION SYSTEM, MULTIPLANAR, WITH IMAGING GUIDANCE (Left) 1 Day  Post-Op    Plan:     During this hospitalization, patient to be seen 6 x/week to address the identified rehab impairments via gait training, therapeutic activities, therapeutic exercises, neuromuscular re-education and progress toward the following goals:    Plan of Care Expires:  12/28/24    Subjective     Chief Complaint: none at this [facility]  Patient/Family Comments/goals: to be able to ambulate safely  Pain/Comfort:  Pain Rating 1: 0/10    Patients cultural, spiritual, Sabianist conflicts given the current situation: no    Living Environment:  Lives with sister in  1 level home, no steps to enter if going in through the garage, 1 small step in front  Prior to admission, patients level of function was indep.  Equipment used at home: none.  DME owned (not currently used):  knee scooter .  Upon discharge, patient will have assistance from family.    Objective:     Communicated with nsg prior to session.  Patient found sitting edge of bed with bed alarm, external fixator, peripheral IV  upon PT entry to room.    General Precautions: Standard, fall  Orthopedic Precautions:LLE non weight bearing   Braces: N/A  Respiratory Status: Room air    Exams:  Gross Motor Coordination:  WFL  Skin Integrity/Edema:  -       slight drainage from heel - covered by NSG with gauze  RLE ROM: WFL  RLE Strength: WFL  LLE ROM: WFL except ankle immobilized, min challenge with knee extension secondary to weight of ExFix  LLE Strength: WFL except ankle NT    Patient donned non slip socks and gait belt for OOB mobility    Functional Mobility:  Bed Mobility:  Supine to Sit: modified independence  Sit to Supine: modified independence  Transfers:  Sit to Stand:  contact guard assistance with axillary crutches  Gait: amb 5 feet back and forth to door with crutches with Min A, pt had instances of post lean, as he had challenges maneuvering the exfix and caused slight balance challenges      AM-PAC 6 CLICK MOBILITY  Total Score:15       Treatment &  Education:   PT educated patient:  PT plan of care/role of PT  Safety with OOB mobility - call staff  Use of RW vs crutches for household and community ambulation.   Energy conservation techniques   Discharge disposition  - LIT  Pt  verbalized understanding       Patient left supine with call button in reach and bed alarm on.    GOALS:   Multidisciplinary Problems       Physical Therapy Goals          Problem: Physical Therapy    Goal Priority Disciplines Outcome Interventions   Physical Therapy Goal     PT, PT/OT Progressing    Description: Goals to be met by: 2024     Patient will increase functional independence with mobility by performin. Sit to stand transfer with Modified Sanders  2. Gait  x 150 feet with Modified Sanders using Crutches.   3. Ascend/Descend 8 inch curb step with Modified Sanders using Crutches.  5. Stand for 15 minutes with Modified Sanders using Crutches                         History:     Past Medical History:   Diagnosis Date    Actinomyces infection 2017    Right foot    Colon adenocarcinoma 2022    Diabetic ketoacidosis without coma associated with type 2 diabetes mellitus 2017    Diabetic ulcer of right foot associated with type 2 diabetes mellitus 2015    Disorder of kidney and ureter     Essential hypertension 2013    Group B streptococcal infection 2017    Mixed hyperlipidemia 2014    Septic arthritis of left foot 2021    Shoulder impingement 2014    Subacute osteomyelitis of right foot 2017    Streptococcus agalactiae, Actinomyces odontolyticus    Traumatic amputation of fifth toe of right foot 2015    Type 2 diabetes mellitus with diabetic neuropathy, with long-term current use of insulin 2016       Past Surgical History:   Procedure Laterality Date    COLONOSCOPY Right 2022    Procedure: COLONOSCOPY;  Surgeon: Tj Haile MD;  Location: [facility] ([address] [place] [address]);  Service:  Endoscopy;  Laterality: Right;  previous order un-usable/poor prep 1/18/22  RAPID COVID test arrival 12:20  instructions handed to pt-     COLONOSCOPY N/A 3/21/2022    Procedure: COLONOSCOPY;  Surgeon: Sheng Haque MD;  Location: [facility] ENDO (2ND FLR);  Service: Endoscopy;  Laterality: N/A;  Colonoscopy with AES for hepatix flexure polyp removal, 2nd floor  Pt is fully vaccinated-DS  Pt prescribed Plavix by Dr. Stahl in Jan. 2022, however pt states that he never started taking it-DS  3/16/22-Instructions sent via portal-DS    COLONOSCOPY N/A 9/13/2023    Procedure: COLONOSCOPY;  Surgeon: Erik Stout MD;  Location: [facility] ENDO (4TH FLR);  Service: Colon and Rectal;  Laterality: N/A;  Referred by Dr. Stout, timur, [facility], portal -ml    DEBRIDEMENT OF FOOT Left 7/14/2019    Procedure: DEBRIDEMENT, FOOT, with left 5th ray amputation;  Surgeon: Sis Hickman DPM;  Location: [facility] OR 2ND FLR;  Service: Podiatry;  Laterality: Left;    DEBRIDEMENT OF FOOT Left 7/17/2019    Procedure: DEBRIDEMENT, FOOT with leftt 5th ray partial amputation with Neox Graft;  Surgeon: Mai Burrell DPM;  Location: [facility] OR 2ND FLR;  Service: Podiatry;  Laterality: Left;  t    DEBRIDEMENT OF FOOT Bilateral 4/29/2021    Procedure: DEBRIDEMENT, FOOT;  Surgeon: Mai Burrell DPM;  Location: [facility] OR 1ST FLR;  Service: Podiatry;  Laterality: Bilateral;  Graft application    DEBRIDEMENT OF FOOT Left 7/31/2023    Procedure: DEBRIDEMENT, FOOT;  Surgeon: Marivel Peterson DPM;  Location: [facility] OR 2ND FLR;  Service: Podiatry;  Laterality: Left;    TOE AMPUTATION Right 06/05/2015    TOE AMPUTATION Right 01/19/2017    XI ROBOTIC COLECTOMY, RIGHT N/A 4/25/2022    Procedure: XI ROBOTIC COLECTOMY, RIGHT (lithotomy, eras low);  Surgeon: Erik Stout MD;  Location: [facility] OR 2ND FLR;  Service: Colon and Rectal;  Laterality: N/A;  Paruch to Goodwin    XI ROBOTIC COLECTOMY, RIGHT  4/25/2022    Procedure: ;  Surgeon: Erik LANIER  MD Argelia;  Location: [facility] OR [address] [place] [address];  Service: Colon and Rectal;;       Time Tracking:     PT Received On: 11/28/24  PT Start Time: 0905     PT Stop Time: 0928  PT Total Time (min): 23 min     Billable Minutes: Evaluation 13 and Gait Training 10      11/28/2024

## 2024-11-28 NOTE — PROGRESS NOTES
[facility]  Podiatry  Progress Note    Patient Name: Indio Ryder Jr.  MRN: [id number]  Admission Date: 11/27/2024  Hospital Length of Stay: 1 days  Attending Physician: Daniel Reyes DPM  Primary Care Provider: Lorena Thakur MD     Subjective:     Interval History: Seen at bedside resting comfortably. No pain reported. Denies N/V/F/C.     Follow-up For: Procedure(s) (LRB):  IRRIGATION AND DEBRIDEMENT of osteomyelitic bone (Left) foot  OPEN BONE BIOPSY LEFT FOOT CALCANEUS  3.   IMPLANTATION OF ANTIBIOTIC ELUDING CEMENT AND BIOACTIVE GLASS LEFT FOOT  4.   FUSION OF TALONAVICULAR JOINT LEFT FOOT WITH PERCUTANEOUS PINNING  5.   EXOSTECTOMY LEFT ANKLE  6.   ACHILLES TENDON LENGTHENING LEFT  7.   APPLICATION, MULTIPLE PLANE EXTERNAL FIXATION SYSTEM LEFT LOWER EXTREMITY    Post-Operative Day: 1 Day Post-Op    Scheduled Meds:   ceFEPime IV (PEDS and ADULTS)  2 g Intravenous Q8H    enoxparin  40 mg Subcutaneous Daily    insulin glargine U-100  15 Units Subcutaneous QHS    metroNIDAZOLE  500 mg Oral Q8H    pantoprazole  40 mg Oral Daily    tobramycin  240 mg Intramuscular Once    vancomycin (VANCOCIN) IV (PEDS and ADULTS)  2,250 mg Intravenous Once    vancomycin (VANCOCIN) IV (PEDS and ADULTS)  1,750 mg Intravenous Q12H     Continuous Infusions:  PRN Meds:  Current Facility-Administered Medications:     acetaminophen, 650 mg, Oral, Q4H PRN    dextrose 10%, 12.5 g, Intravenous, PRN    dextrose 10%, 25 g, Intravenous, PRN    glucagon (human recombinant), 1 mg, Intramuscular, PRN    glucose, 16 g, Oral, PRN    glucose, 24 g, Oral, PRN    HYDROmorphone, 1 mg, Intravenous, Q3H PRN    influenza, 0.5 mL, Intramuscular, vaccine x 1 dose    insulin aspart U-100, 0-10 Units, Subcutaneous, QID (AC + HS) PRN    ondansetron, 4 mg, Intravenous, Q8H PRN    oxyCODONE-acetaminophen, 1 tablet, Oral, Q4H PRN    oxyCODONE-acetaminophen, 1 tablet, Oral, Q4H PRN    sodium chloride 0.9%, 10 mL, Intravenous, PRN    Pharmacy to  dose Vancomycin consult, , , Once **AND** vancomycin - pharmacy to dose, , Intravenous, pharmacy to manage frequency    Review of Systems   Constitutional:  Negative for chills, fatigue and fever.   HENT:  Negative for congestion and hearing loss.    Respiratory:  Negative for cough and shortness of breath.    Cardiovascular:  Negative for chest pain.   Gastrointestinal:  Negative for diarrhea, nausea and vomiting.   Skin:  Positive for wound.   Neurological:  Positive for numbness.   Psychiatric/Behavioral:  Negative for agitation and confusion.      Objective:     Vital Signs (Most Recent):  Temp: 97.8 °F (36.6 °C) (11/28/24 0740)  Pulse: 94 (11/28/24 0740)  Resp: 20 (11/28/24 0740)  BP: (!) 146/85 (11/28/24 0740)  SpO2: 100 % (11/28/24 0923) Vital Signs (24h Range):  Temp:  [97.6 °F (36.4 °C)-98.7 °F (37.1 °C)] 97.8 °F (36.6 °C)  Pulse:  [] 94  Resp:  [11-20] 20  SpO2:  [95 %-100 %] 100 %  BP: (107-171)/() 146/85     Weight: 98.1 kg (216 lb 4.3 oz)  Body mass index is 28.53 kg/m².    Foot Exam    General  General Appearance: appears stated age and healthy   Orientation: alert and oriented to person, place, and time   Affect: appropriate       Left Foot/Ankle      Comments  External fixation intact to left lower extremity and dressing clean, dry and intact.       Laboratory:  A1C:   Recent Labs   Lab 08/08/24  1335 10/16/24  1221   HGBA1C >14.0* 11.0*     BMP:   Recent Labs   Lab 11/28/24  0520         K 3.8      CO2 23   BUN 18   CREATININE 0.9   CALCIUM 9.3     CBC:   Recent Labs   Lab 11/28/24  0520   WBC 7.13   RBC 3.57*   HGB 9.8*   HCT 32.3*      MCV 91   MCH 27.5   MCHC 30.3*     CMP:   Recent Labs   Lab 11/25/24  1423 11/28/24  0520   GLU 91 109   CALCIUM 8.8 9.3   ALBUMIN 2.7*  --    PROT 7.4  --     140   K 3.8 3.8   CO2 25 23    106   BUN 11 18   CREATININE 0.9 0.9   ALKPHOS 189*  --    ALT 12  --    AST 16  --    BILITOT 0.4  --      Wound Cultures:    Recent Labs   Lab 06/25/24  1630 07/05/24  0910 10/11/24  0925 11/27/24  1516   LABAERO No growth No significant isolate PSEUDOMONAS AERUGINOSA  Moderate  * No growth     Microbiology Results (last 7 days)       Procedure Component Value Units Date/Time    Culture, Anaerobe [[id number]] Collected: 11/27/24 1516    Order Status: Completed Specimen: Bone from Foot, Left Updated: 11/28/24 1101     Anaerobic Culture Culture in progress    Narrative:      Left foot calcaneous    Aerobic culture [[id number]] Collected: 11/27/24 1516    Order Status: Completed Specimen: Bone from Foot, Left Updated: 11/28/24 0656     Aerobic Bacterial Culture No growth    Narrative:      Left foot calcaneous    Gram stain [[id number]] Collected: 11/27/24 1516    Order Status: Completed Specimen: Bone from Foot, Left Updated: 11/27/24 2136     Gram Stain Result No WBC's      No organisms seen    Narrative:      Left foot calcaneous    AFB Culture & Smear [[id number]] Collected: 11/27/24 1516    Order Status: Sent Specimen: Bone from Foot, Left Updated: 11/27/24 1923    Fungus culture [[id number]] Collected: 11/27/24 1516    Order Status: Sent Specimen: Bone from Foot, Left Updated: 11/27/24 1923    Culture, Anaerobe [[id number]]     Order Status: Canceled Specimen: Bone from Foot, Left     Aerobic culture [[id number]]     Order Status: Canceled Specimen: Bone from Foot, Left     Fungus culture [[id number]]     Order Status: Canceled Specimen: Bone from Foot, Left     AFB Culture & Smear [[id number]]     Order Status: Canceled Specimen: Bone from Foot, Left     Gram stain [[id number]]     Order Status: Canceled Specimen: Bone from Foot, Left           Specimen (24h ago, onward)       Start     Ordered    11/27/24 1453  Specimen to Pathology, Surgery Orthopedics  Once        Comments: Pre-op Diagnosis: Chronic multifocal osteomyelitis of left foot [M86.372]Subluxation of tarsal joint of foot, left, sequela [S93.312S]Type 2 diabetes mellitus  with Charcot joint arthropathy [E11.610]Equinus contracture of left ankle [M24.572]Exostosis of bone of ankle [M89.8X7]Acquired varus deformity of foot, left [M21.172]Procedure(s):IRRIGATION AND DEBRIDEMENTAPPLICATION, EXTERNAL FIXATION SYSTEM, MULTIPLANAR, WITH IMAGING GUIDANCE Number of specimens: 1Name of specimens: 1. Left foot Calcaneous- perm     References:    Click here for ordering Quick Tip   Question Answer Comment   Procedure Type: Orthopedics    Specimen Class: Routine/Screening    Release to patient Immediate        11/27/24 1453    11/27/24 1410  Specimen to Pathology, Surgery Orthopedics  Once,   Status:  Canceled        Comments: Pre-op Diagnosis: Chronic multifocal osteomyelitis of left foot [M86.372]Subluxation of tarsal joint of foot, left, sequela [S93.312S]Type 2 diabetes mellitus with Charcot joint arthropathy [E11.610]Equinus contracture of left ankle [M24.572]Exostosis of bone of ankle [M89.8X7]Acquired varus deformity of foot, left [M21.172]Procedure(s):IRRIGATION AND DEBRIDEMENTAPPLICATION, EXTERNAL FIXATION SYSTEM, MULTIPLANAR, WITH IMAGING GUIDANCE Number of specimens: 1Name of specimens: 1. Left foot Calcaneous- perm     References:    Click here for ordering Quick Tip   Question Answer Comment   Procedure Type: Orthopedics    Specimen Class: Routine/Screening    Release to patient Immediate        11/27/24 1410                    Diagnostic Results:  I have reviewed all pertinent imaging results/findings within the past 24 hours.    Clinical Findings:    Assessment/Plan:     Cardiac/Vascular  Mixed hyperlipidemia  Medical management per Hospital Medicine.     Essential hypertension  Medical management per Hospital Medicine.     ID  * Chronic osteomyelitis of left foot  POD#1: Pending intra-op bone c&s and pathology from the calcaneus. The cuboid was excised and bioactive glass/antibiotic eluding cement implanted. Ex-fix left lower extremity well aligned and intact. IV antibiotic  therapy per ID recommendations. Anticipate 3-4 days prior to final c&s and antibiotic recommendations made. Will need HH upon discharge with possible PICC line. NWB left lower extremity for now but may apply partial weight for transfers. Elevate above heart level at rest.     Endocrine  Type 2 diabetes mellitus with hyperglycemia, with long-term current use of insulin  Medical management per Hospital Medicine.         Daniel Reyes DPM  Podiatry  [facility]

## 2024-11-28 NOTE — CONSULTS
General Infectious Diseases: Consult Note    Consult Requested By: Daniel Reyes DPM    Reason for Consult: osteomyelitis      IMPRESSION:  53yo man w/a history of HTN, HLD, IDDM2 (A1c=11%; c/b neuropathy, DFU with Charcot feet and chronic osteomyelitis of bilateral feet; most recently admitted to [facility] in 10/2024 with DKA, Pseudomonas and K.aerogenes bacteremia with acute left 3rd MT, cuboid, and cuneiform osteomyelitis/infected DFU and completing 6wks ciprofloxacin through 11/29), and colon adenocarcinoma (2022) who was admitted on 11/27/2024 with worsening left plantar midfoot ulceration despite prolonged ciprofloxacin course with 3cm lateral midfoot abscess with underlying calcaneus/cuboid osteomyelitis and pathologic fracture on CT (s/p I&D, implantation of bioactive glass/antibiotic eluting cement, talonavicular fusion via pin for stabilization, exostectomy, Achilles tendon lengthening, and EFIX LLE on 11/27/2024). Repeat bone biopsy was obtained in the OR for culture and ID has been consulted to tailor antibiotic therapy. He is doing well post-operatively on empiric therapy.    - have broadened coverage to vancomycin (dosed per CrCl), cefepime 2g IV q8h, and metronidazole 500mg PO q8h given prior foot culture results   - await pending operative cultures  - anticipate need for 6wks of IV antibiotic therapy but will be decided following culture review    Thanks for the consult. ID will follow the patient with you.    Waleska Childs MD  ID Attending  [phone]    SUBJECTIVE:     History of Present Illness:  Mr. Ryder is a 53yo man w/a history of HTN, HLD, IDDM2 (A1c=11%; c/b neuropathy, DFU with Charcot feet and chronic osteomyelitis of bilateral feet; most recently admitted to [facility] in 10/2024 with DKA, Pseudomonas and K.aerogenes bacteremia with acute left 3rd MT, cuboid, and cuneiform osteomyelitis/infected DFU and completing 6wks ciprofloxacin through 11/29), and colon adenocarcinoma (2022)  who was admitted on 11/27/2024 with worsening left plantar midfoot ulceration despite prolonged ciprofloxacin course with 3cm lateral midfoot abscess with underlying calcaneus/cuboid osteomyelitis and pathologic fracture (s/p I&D, implantation of bioactive glass/antibiotic eluting cement, talonavicular fusion via pin for stabilization, exostectomy, Achilles tendon lengthening, and EFIX LLE on 11/27/2024). Repeat bone biopsy was obtained in the OR for culture and ID has been consulted to tailor antibiotic therapy. Patient denies systemic symptoms including F/C/S and feels his diabetes control is overall improved from prior. He denies post-op pain. Review of recent foot culture data is notable for S.lugdenensis (methicillin resistant), Pseudomonas, Enterococcus, and anaerobes.    Past Medical History:  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    Right foot    Colon adenocarcinoma 04/12/2022    Diabetic ketoacidosis without coma associated with type 2 diabetes mellitus 05/30/2017    Diabetic ulcer of right foot associated with type 2 diabetes mellitus 06/03/2015    Disorder of kidney and ureter     Essential hypertension 06/06/2013    Group B streptococcal infection 01/13/2017    Mixed hyperlipidemia 08/12/2014    Septic arthritis of left foot 04/28/2021    Shoulder impingement 08/12/2014    Subacute osteomyelitis of right foot 01/12/2017    Streptococcus agalactiae, Actinomyces odontolyticus    Traumatic amputation of fifth toe of right foot 07/02/2015    Type 2 diabetes mellitus with diabetic neuropathy, with long-term current use of insulin 05/03/2016       Past Surgical History:  Past Surgical History:   Procedure Laterality Date    COLONOSCOPY Right 1/19/2022    Procedure: COLONOSCOPY;  Surgeon: Tj Haile MD;  Location: [address];  Service: Endoscopy;  Laterality: Right;  previous order un-usable/poor prep 1/18/22  RAPID COVID test arrival 12:20  instructions handed  to pt- sm    COLONOSCOPY N/A 3/21/2022    Procedure: COLONOSCOPY;  Surgeon: Sheng Haque MD;  Location: [facility] ENDO (2ND FLR);  Service: Endoscopy;  Laterality: N/A;  Colonoscopy with AES for hepatix flexure polyp removal, 2nd floor  Pt is fully vaccinated-DS  Pt prescribed Plavix by Dr. Stahl in Jan. 2022, however pt states that he never started taking it-DS  3/16/22-Instructions sent via portal-DS    COLONOSCOPY N/A 9/13/2023    Procedure: COLONOSCOPY;  Surgeon: Erik Stout MD;  Location: [facility] ENDO (4TH FLR);  Service: Colon and Rectal;  Laterality: N/A;  Referred by Dr. Stout, [facility], [facility], portal -ml    DEBRIDEMENT OF FOOT Left 7/14/2019    Procedure: DEBRIDEMENT, FOOT, with left 5th ray amputation;  Surgeon: Sis Hickman DPM;  Location: [facility] OR 2ND FLR;  Service: Podiatry;  Laterality: Left;    DEBRIDEMENT OF FOOT Left 7/17/2019    Procedure: DEBRIDEMENT, FOOT with leftt 5th ray partial amputation with Neox Graft;  Surgeon: Mai Burrell DPM;  Location: [facility] OR 2ND FLR;  Service: Podiatry;  Laterality: Left;  t    DEBRIDEMENT OF FOOT Bilateral 4/29/2021    Procedure: DEBRIDEMENT, FOOT;  Surgeon: Mai Burrell DPM;  Location: [facility] OR 1ST FLR;  Service: Podiatry;  Laterality: Bilateral;  Graft application    DEBRIDEMENT OF FOOT Left 7/31/2023    Procedure: DEBRIDEMENT, FOOT;  Surgeon: Marivel Peterson DPM;  Location: [facility] OR 2ND FLR;  Service: Podiatry;  Laterality: Left;    TOE AMPUTATION Right 06/05/2015    TOE AMPUTATION Right 01/19/2017    XI ROBOTIC COLECTOMY, RIGHT N/A 4/25/2022    Procedure: XI ROBOTIC COLECTOMY, RIGHT (lithotomy, eras low);  Surgeon: Erik Stout MD;  Location: [facility] OR 2ND FLR;  Service: Colon and Rectal;  Laterality: N/A;  Paruch to [place]    XI ROBOTIC COLECTOMY, RIGHT  4/25/2022    Procedure: ;  Surgeon: Erik Stout MD;  Location: [facility] OR 2ND FLR;  Service: Colon and Rectal;;       Family History:  Family History   Adopted: Yes  "  Problem Relation Name Age of Onset    Hypertension Mother      Cancer Mother          breast    Diabetes Mother      Hypertension Father      Cataracts Father      Heart disease Father          mi    Diabetes Sister Michelle     No Known Problems Brother      Heart disease Sister Manuela UMANZOR    No Known Problems Sister      Hypertension Maternal Aunt      No Known Problems Maternal Uncle      No Known Problems Paternal Aunt      No Known Problems Paternal Uncle      No Known Problems Maternal Grandmother      No Known Problems Maternal Grandfather      No Known Problems Paternal Grandmother      No Known Problems Paternal Grandfather      Amblyopia Neg Hx      Blindness Neg Hx      Glaucoma Neg Hx      Macular degeneration Neg Hx      Retinal detachment Neg Hx      Strabismus Neg Hx      Stroke Neg Hx      Thyroid disease Neg Hx         Social History:  Social History     Tobacco Use    Smoking status: Never    Smokeless tobacco: Never   Substance Use Topics    Alcohol use: No    Drug use: No       Allergies:  Review of patient's allergies indicates:  No Known Allergies     Pertinent Medications:  Antibiotics (From admission, onward)      Start     Stop Route Frequency Ordered    11/28/24 2230  vancomycin 1750 mg in 0.9% sodium chloride 500 mL IVPB         -- IV Every 12 hours (non-standard times) 11/28/24 1006    11/28/24 1400  metroNIDAZOLE tablet 500 mg         -- Oral Every 8 hours 11/28/24 0949    11/28/24 1100  ceFEPIme injection 2 g         -- IV Every 8 hours (non-standard times) 11/28/24 0949    11/28/24 1048  vancomycin - pharmacy to dose  (vancomycin IVPB (PEDS and ADULTS))        Placed in "And" Linked Group    -- IV pharmacy to manage frequency 11/28/24 0949    11/28/24 1030  vancomycin (VANCOCIN) 2,250 mg in 0.9% NaCl 500 mL IVPB         -- IV Once 11/28/24 0956    11/27/24 0945  tobramycin injection 240 mg         -- IM Once 11/27/24 0835              Review of Symptoms:  Constitutional: Denies " fevers, weight loss, chills, or weakness.  Eyes: Denies changes in vision.  ENT: Denies dysphagia, nasal discharge, ear pain or discharge.  Cardiovascular: Denies chest pain, palpitations, orthopnea, or claudication.  Respiratory: Denies shortness of breath, cough, hemoptysis, or wheezing.  GI: Denies nausea/vomiting, hematochezia, melena, abd pain, or changes in appetite.  : Denies dysuria, incontinence, or hematuria.  Musculoskeletal: Denies joint pain or myalgias.  Skin/breast: Denies rashes, lumps, lesions, or discharge.  Neurologic: Denies headache, dizziness, vertigo, or paresthesias.  Psychiatric: Denies changes in mood or hallucinations.  Endocrine: Denies polyuria, polydipsia, heat/cold intolerance.  Hematologic/Lymph: Denies lymphadenopathy, easy bruising or easy bleeding.  Allergic/Immunologic: Denies rash, rhinitis.     OBJECTIVE:     Vital Signs (Most Recent)  Temp: 97.8 °F (36.6 °C) (11/28/24 0740)  Pulse: 94 (11/28/24 0740)  Resp: 20 (11/28/24 0740)  BP: (!) 146/85 (11/28/24 0740)  SpO2: 100 % (11/28/24 0923)    Temperature Range Min/Max (Last 24H):  Temp:  [97.6 °F (36.4 °C)-98.2 °F (36.8 °C)]     Physical Exam:  Gen: Nontoxic, comfortable, no acute distress.    HEENT: PERRL. No scleral icterus. EOMI intact. No sinus tenderness. OP clear.  Pulmonary: Non labored. Clear to auscultation A/P/L. No wheezing, crackles, or rhonchi.   Cardiac: Normal S1 & S2 w/o rubs/murmurs/gallops.   Abdomen: Normal bowel sounds. Soft, nontender, nondistended. No rebound or guarding. No hepatosplenomegaly.  Extremities: No clubbing, cyanosis, or peripheral edema. Distal pulses symmetric.  Lymphatics: no preauricular, cervical LAD.  Musculoskeletal: surgical changes to R foot also. L foot in EFIX and wrapped.  Neurological:  Alert and oriented.  CN II-XII grossly intact. Gross motor intact x4. Sensation grossly intact.  Skin: No jaundice, rashes, or visible lesions.      Laboratory:  CBC:   Lab Results   Component Value  Date    WBC 7.13 11/28/2024    HGB 9.8 (L) 11/28/2024    HCT 32.3 (L) 11/28/2024    MCV 91 11/28/2024     11/28/2024       BMP:   Recent Labs   Lab 11/28/24  0520         K 3.8      CO2 23   BUN 18   CREATININE 0.9   CALCIUM 9.3       LFT:   Lab Results   Component Value Date    ALT 12 11/25/2024    AST 16 11/25/2024    ALKPHOS 189 (H) 11/25/2024    BILITOT 0.4 11/25/2024       Microbiology:  11/27 OR cx pending      Diagnostic Results: personally reviewed and interpreted.     CT L foot:  Amputation of the 3rd and 4th toes.     Extensive erosive changes in the hindfoot and midfoot, most pronounced in the anterior calcaneus and cuboid, concerning for osteomyelitis.     Suspected pathologic fractures of the anterior calcaneus and cuboid.     3.0 cm peripherally enhancing fluid collection in the lateral midfoot, concerning for abscess.       ASSESSMENT/PLAN:     Active Hospital Problems    Diagnosis  POA    *Chronic osteomyelitis of left foot [M86.672]  Yes    Acquired varus deformity of foot, left [M21.172]  Yes    Exostosis of bone of ankle [M89.8X7]  Yes    Equinus contracture of left ankle [M24.572]  Yes    Subluxation of tarsal joint of foot, left, sequela [S93.312S]  Not Applicable    Type 2 diabetes mellitus with Charcot joint arthropathy [E11.610]  Yes    Subacute osteomyelitis of left foot [M86.272]  Yes    Type 2 diabetes mellitus with hyperglycemia, with long-term current use of insulin [E11.65, Z79.4]  Not Applicable    Mixed hyperlipidemia [E78.2]  Yes    Essential hypertension [I10]  Yes      Resolved Hospital Problems   No resolved problems to display.       Plan: Please see top of page

## 2024-11-28 NOTE — SUBJECTIVE & OBJECTIVE
Subjective:     Interval History: Seen at bedside resting comfortably. No pain reported. Denies N/V/F/C.     Follow-up For: Procedure(s) (LRB):  IRRIGATION AND DEBRIDEMENT of osteomyelitic bone (Left) foot  OPEN BONE BIOPSY LEFT FOOT CALCANEUS  3.   IMPLANTATION OF ANTIBIOTIC ELUDING CEMENT AND BIOACTIVE GLASS LEFT FOOT  4.   FUSION OF TALONAVICULAR JOINT LEFT FOOT WITH PERCUTANEOUS PINNING  5.   EXOSTECTOMY LEFT ANKLE  6.   ACHILLES TENDON LENGTHENING LEFT  7.   APPLICATION, MULTIPLE PLANE EXTERNAL FIXATION SYSTEM LEFT LOWER EXTREMITY    Post-Operative Day: 1 Day Post-Op    Scheduled Meds:   ceFEPime IV (PEDS and ADULTS)  2 g Intravenous Q8H    enoxparin  40 mg Subcutaneous Daily    insulin glargine U-100  15 Units Subcutaneous QHS    metroNIDAZOLE  500 mg Oral Q8H    pantoprazole  40 mg Oral Daily    tobramycin  240 mg Intramuscular Once    vancomycin (VANCOCIN) IV (PEDS and ADULTS)  2,250 mg Intravenous Once    vancomycin (VANCOCIN) IV (PEDS and ADULTS)  1,750 mg Intravenous Q12H     Continuous Infusions:  PRN Meds:  Current Facility-Administered Medications:     acetaminophen, 650 mg, Oral, Q4H PRN    dextrose 10%, 12.5 g, Intravenous, PRN    dextrose 10%, 25 g, Intravenous, PRN    glucagon (human recombinant), 1 mg, Intramuscular, PRN    glucose, 16 g, Oral, PRN    glucose, 24 g, Oral, PRN    HYDROmorphone, 1 mg, Intravenous, Q3H PRN    influenza, 0.5 mL, Intramuscular, vaccine x 1 dose    insulin aspart U-100, 0-10 Units, Subcutaneous, QID (AC + HS) PRN    ondansetron, 4 mg, Intravenous, Q8H PRN    oxyCODONE-acetaminophen, 1 tablet, Oral, Q4H PRN    oxyCODONE-acetaminophen, 1 tablet, Oral, Q4H PRN    sodium chloride 0.9%, 10 mL, Intravenous, PRN    Pharmacy to dose Vancomycin consult, , , Once **AND** vancomycin - pharmacy to dose, , Intravenous, pharmacy to manage frequency    Review of Systems   Constitutional:  Negative for chills, fatigue and fever.   HENT:  Negative for congestion and hearing loss.     Respiratory:  Negative for cough and shortness of breath.    Cardiovascular:  Negative for chest pain.   Gastrointestinal:  Negative for diarrhea, nausea and vomiting.   Skin:  Positive for wound.   Neurological:  Positive for numbness.   Psychiatric/Behavioral:  Negative for agitation and confusion.      Objective:     Vital Signs (Most Recent):  Temp: 97.8 °F (36.6 °C) (11/28/24 0740)  Pulse: 94 (11/28/24 0740)  Resp: 20 (11/28/24 0740)  BP: (!) 146/85 (11/28/24 0740)  SpO2: 100 % (11/28/24 0923) Vital Signs (24h Range):  Temp:  [97.6 °F (36.4 °C)-98.7 °F (37.1 °C)] 97.8 °F (36.6 °C)  Pulse:  [] 94  Resp:  [11-20] 20  SpO2:  [95 %-100 %] 100 %  BP: (107-171)/() 146/85     Weight: 98.1 kg (216 lb 4.3 oz)  Body mass index is 28.53 kg/m².    Foot Exam    General  General Appearance: appears stated age and healthy   Orientation: alert and oriented to person, place, and time   Affect: appropriate       Left Foot/Ankle      Comments  External fixation intact to left lower extremity and dressing clean, dry and intact.       Laboratory:  A1C:   Recent Labs   Lab 08/08/24  1335 10/16/24  1221   HGBA1C >14.0* 11.0*     BMP:   Recent Labs   Lab 11/28/24  0520         K 3.8      CO2 23   BUN 18   CREATININE 0.9   CALCIUM 9.3     CBC:   Recent Labs   Lab 11/28/24  0520   WBC 7.13   RBC 3.57*   HGB 9.8*   HCT 32.3*      MCV 91   MCH 27.5   MCHC 30.3*     CMP:   Recent Labs   Lab 11/25/24  1423 11/28/24  0520   GLU 91 109   CALCIUM 8.8 9.3   ALBUMIN 2.7*  --    PROT 7.4  --     140   K 3.8 3.8   CO2 25 23    106   BUN 11 18   CREATININE 0.9 0.9   ALKPHOS 189*  --    ALT 12  --    AST 16  --    BILITOT 0.4  --      Wound Cultures:   Recent Labs   Lab 06/25/24  1630 07/05/24  0910 10/11/24  0925 11/27/24  1516   LABAERO No growth No significant isolate PSEUDOMONAS AERUGINOSA  Moderate  * No growth     Microbiology Results (last 7 days)       Procedure Component Value Units  Date/Time    Culture, Anaerobe [[id number]] Collected: 11/27/24 1516    Order Status: Completed Specimen: Bone from Foot, Left Updated: 11/28/24 1101     Anaerobic Culture Culture in progress    Narrative:      Left foot calcaneous    Aerobic culture [[id number]] Collected: 11/27/24 1516    Order Status: Completed Specimen: Bone from Foot, Left Updated: 11/28/24 0656     Aerobic Bacterial Culture No growth    Narrative:      Left foot calcaneous    Gram stain [[id number]] Collected: 11/27/24 1516    Order Status: Completed Specimen: Bone from Foot, Left Updated: 11/27/24 2136     Gram Stain Result No WBC's      No organisms seen    Narrative:      Left foot calcaneous    AFB Culture & Smear [[id number]] Collected: 11/27/24 1516    Order Status: Sent Specimen: Bone from Foot, Left Updated: 11/27/24 1923    Fungus culture [[id number]] Collected: 11/27/24 1516    Order Status: Sent Specimen: Bone from Foot, Left Updated: 11/27/24 1923    Culture, Anaerobe [[id number]]     Order Status: Canceled Specimen: Bone from Foot, Left     Aerobic culture [[id number]]     Order Status: Canceled Specimen: Bone from Foot, Left     Fungus culture [[id number]]     Order Status: Canceled Specimen: Bone from Foot, Left     AFB Culture & Smear [[id number]]     Order Status: Canceled Specimen: Bone from Foot, Left     Gram stain [[id number]]     Order Status: Canceled Specimen: Bone from Foot, Left           Specimen (24h ago, onward)       Start     Ordered    11/27/24 1453  Specimen to Pathology, Surgery Orthopedics  Once        Comments: Pre-op Diagnosis: Chronic multifocal osteomyelitis of left foot [M86.372]Subluxation of tarsal joint of foot, left, sequela [S93.312S]Type 2 diabetes mellitus with Charcot joint arthropathy [E11.610]Equinus contracture of left ankle [M24.572]Exostosis of bone of ankle [M89.8X7]Acquired varus deformity of foot, left [M21.172]Procedure(s):IRRIGATION AND DEBRIDEMENTAPPLICATION, EXTERNAL FIXATION SYSTEM,  MULTIPLANAR, WITH IMAGING GUIDANCE Number of specimens: 1Name of specimens: 1. Left foot Calcaneous- perm     References:    Click here for ordering Quick Tip   Question Answer Comment   Procedure Type: Orthopedics    Specimen Class: Routine/Screening    Release to patient Immediate        11/27/24 1453    11/27/24 1410  Specimen to Pathology, Surgery Orthopedics  Once,   Status:  Canceled        Comments: Pre-op Diagnosis: Chronic multifocal osteomyelitis of left foot [M86.372]Subluxation of tarsal joint of foot, left, sequela [S93.312S]Type 2 diabetes mellitus with Charcot joint arthropathy [E11.610]Equinus contracture of left ankle [M24.572]Exostosis of bone of ankle [M89.8X7]Acquired varus deformity of foot, left [M21.172]Procedure(s):IRRIGATION AND DEBRIDEMENTAPPLICATION, EXTERNAL FIXATION SYSTEM, MULTIPLANAR, WITH IMAGING GUIDANCE Number of specimens: 1Name of specimens: 1. Left foot Calcaneous- perm     References:    Click here for ordering Quick Tip   Question Answer Comment   Procedure Type: Orthopedics    Specimen Class: Routine/Screening    Release to patient Immediate        11/27/24 1410                    Diagnostic Results:  I have reviewed all pertinent imaging results/findings within the past 24 hours.    Clinical Findings:

## 2024-11-28 NOTE — PROGRESS NOTES
Pharmacist Renal Dose Adjustment Note    Indio Ryder Jr. is a 54 y.o. male being treated with the medication ciprofloxacin     Patient Data:    Vital Signs (Most Recent):  Temp: 97.7 °F (36.5 °C) (11/28/24 0329)  Pulse: 86 (11/28/24 0329)  Resp: 16 (11/28/24 0329)  BP: (!) 153/85 (11/28/24 0329)  SpO2: 95 % (11/28/24 0329) Vital Signs (72h Range):  Temp:  [97.6 °F (36.4 °C)-98.7 °F (37.1 °C)]   Pulse:  []   Resp:  [11-18]   BP: (107-171)/()   SpO2:  [95 %-100 %]      Recent Labs   Lab 11/25/24  1423   CREATININE 0.9     Serum creatinine: 0.9 mg/dL 11/25/24 1423  Estimated creatinine clearance: 113 mL/min    Medication:ciprofloxacin dose: 400 mg frequency q12h will be changed to medication:ciprofloxacin dose:400 mg frequency:q8h  Antipseudomonal dosing     Pharmacist's Name: Isabella Mcarthur  Pharmacist's Extension: [phone]

## 2024-11-28 NOTE — PLAN OF CARE
A&O x4. Pt denies N/V, SOB, distress, and pain. Medications given per MAR. Vital signs stable. LLE Ex fix in place, moderate drainage around heel. Dressing reinforced. LLE elevated. Safety precautions maintained. Bed alarm set. Call bell within reach. . Patient encouraged to call for assistance.

## 2024-11-28 NOTE — PLAN OF CARE
Problem: Physical Therapy  Goal: Physical Therapy Goal  Description: Goals to be met by: 2024     Patient will increase functional independence with mobility by performin. Sit to stand transfer with Modified [facility]  2. Gait  x 150 feet with Modified [facility] using Crutches.   3. Ascend/Descend 8 inch curb step with Modified [facility] using Crutches.  5. Stand for 15 minutes with Modified [facility] using Crutches    Outcome: Progressing     Patient was seated EOB when OT arrived. He had tried using a walker and did not feel comfortable and felt like he had difficulty maneuvering his left lower extremity within the walker and wanted to try crutches. Crutches obtained and educated patient on WB status and how to safely use the crutches to assist in standing / sitting.  Patient was able to stand with Min A and needed assistance placing the crutches un proper place bilaterally. He was then able to walk 2X ~5 feet to the door and back with Min A, as he had challenges with swinging of the LE and had some posterior balance challenges that were easily corrected with standing rest breaks. He was able to return safely to seated position on EOB with CGA for hand placement. He returned to a supine position CGA. LIT recommended after next procedure for hardware removal, Needs crutches while hardware remains on ankle, as he cannot likely use the knee scooter secondary to hardware placement.

## 2024-11-28 NOTE — PROGRESS NOTES
"Pharmacokinetic Initial Assessment: IV Vancomycin    Assessment/Plan:    Initiate intravenous vancomycin with loading dose of 2250 mg once followed by a maintenance dose of vancomycin 1750mg IV every 12 hours  Desired empiric serum trough concentration is 15 to 20 mcg/mL  Draw vancomycin trough level 60 min prior to fourth dose on 11-29 at approximately 21:30  Pharmacy will continue to follow and monitor vancomycin.      Please contact pharmacy at extension [phone] with any questions regarding this assessment.     Thank you for the consult,   Leland LELA Holden       Patient brief summary:  Indio Ryder Jr. is a 54 y.o. male initiated on antimicrobial therapy with IV Vancomycin for treatment of suspected bone/joint infection    Drug Allergies:   Review of patient's allergies indicates:  No Known Allergies    Actual Body Weight:   98.1 KG    Renal Function:   Estimated Creatinine Clearance: 113 mL/min (based on SCr of 0.9 mg/dL).,     Dialysis Method (if applicable):  N/A    CBC (last 72 hours):  Recent Labs   Lab Result Units 11/25/24  1423 11/28/24  0520   WBC K/uL 5.43 7.13   Hemoglobin g/dL 10.3* 9.8*   Hematocrit % 34.7* 32.3*   Platelets K/uL 279 290   Gran % % 52.4 70.8   Lymph % % 36.1 20.1   Mono % % 8.3 8.7   Eosinophil % % 2.4 0.0   Basophil % % 0.6 0.1   Differential Method  Automated Automated       Metabolic Panel (last 72 hours):  Recent Labs   Lab Result Units 11/25/24  1348 11/25/24  1423 11/28/24  0520   Sodium mmol/L  --  141 140   Potassium mmol/L  --  3.8 3.8   Chloride mmol/L  --  107 106   CO2 mmol/L  --  25 23   Glucose mg/dL  --  91 109   Glucose, UA  4+*  --   --    BUN mg/dL  --  11 18   Creatinine mg/dL  --  0.9 0.9   Albumin g/dL  --  2.7*  --    Total Bilirubin mg/dL  --  0.4  --    Alkaline Phosphatase U/L  --  189*  --    AST U/L  --  16  --    ALT U/L  --  12  --        Drug levels (last 3 results):  No results for input(s): "VANCOMYCINRA", "VANCORANDOM", "VANCOMYCINPE", "VANCOPEAK", " ""VANCOMYCINTR", "VANCOTROUGH" in the last 72 hours.    Microbiologic Results:  Microbiology Results (last 7 days)       Procedure Component Value Units Date/Time    Aerobic culture [[id number]] Collected: 11/27/24 1516    Order Status: Completed Specimen: Bone from Foot, Left Updated: 11/28/24 0656     Aerobic Bacterial Culture No growth    Narrative:      Left foot calcaneous    Gram stain [[id number]] Collected: 11/27/24 1516    Order Status: Completed Specimen: Bone from Foot, Left Updated: 11/27/24 2136     Gram Stain Result No WBC's      No organisms seen    Narrative:      Left foot calcaneous    AFB Culture & Smear [[id number]] Collected: 11/27/24 1516    Order Status: Sent Specimen: Bone from Foot, Left Updated: 11/27/24 1923    Culture, Anaerobe [[id number]] Collected: 11/27/24 1516    Order Status: Sent Specimen: Bone from Foot, Left Updated: 11/27/24 1923    Fungus culture [[id number]] Collected: 11/27/24 1516    Order Status: Sent Specimen: Bone from Foot, Left Updated: 11/27/24 1923    Culture, Anaerobe [[id number]]     Order Status: Canceled Specimen: Bone from Foot, Left     Aerobic culture [[id number]]     Order Status: Canceled Specimen: Bone from Foot, Left     Fungus culture [[id number]]     Order Status: Canceled Specimen: Bone from Foot, Left     AFB Culture & Smear [[id number]]     Order Status: Canceled Specimen: Bone from Foot, Left     Gram stain [[id number]]     Order Status: Canceled Specimen: Bone from Foot, Left             "

## 2024-11-28 NOTE — NURSING
Pt. To unit. VSS. Pt. Drowsy but easily aroused. Family at bedside. External fixator intact and elevated. Pt. Oriented to room and call light. Bed alarm on and call light in reach. Pt. Instructed to call for assistance. Plan of care continued.

## 2024-11-28 NOTE — ASSESSMENT & PLAN NOTE
IMPLANTATION OF ANTIBIOTIC ELUDING CEMENT AND BIOACTIVE GLASS LEFT FOOT    OPEN BONE BIOPSY LEFT FOOT CALCANEUS    IRRIGATION AND DEBRIDEMENT of osteomyelitic bone (Left) foot  Defer to podiatry  F/U ID recommendation  Cont IV Abx with cefazolin and cipro

## 2024-11-28 NOTE — PLAN OF CARE
Assumed care of patient. Patient in no apparent distress on room air. Medications administered via MAR. Call light within reach, bed alarm set, safety precautions maintained.    Problem: Adult Inpatient Plan of Care  Goal: Plan of Care Review  Outcome: Progressing  Goal: Patient-Specific Goal (Individualized)  Outcome: Progressing  Goal: Absence of Hospital-Acquired Illness or Injury  Outcome: Progressing  Goal: Optimal Comfort and Wellbeing  Outcome: Progressing  Goal: Readiness for Transition of Care  Outcome: Progressing     Problem: Diabetes Comorbidity  Goal: Blood Glucose Level Within Targeted Range  Outcome: Progressing     Problem: Wound  Goal: Optimal Coping  Outcome: Progressing  Goal: Optimal Functional Ability  Outcome: Progressing  Goal: Absence of Infection Signs and Symptoms  Outcome: Progressing  Goal: Improved Oral Intake  Outcome: Progressing  Goal: Optimal Pain Control and Function  Outcome: Progressing  Goal: Skin Health and Integrity  Outcome: Progressing  Goal: Optimal Wound Healing  Outcome: Progressing     Problem: Infection  Goal: Absence of Infection Signs and Symptoms  Outcome: Progressing     Problem: Comorbidity Management  Goal: Blood Pressure in Desired Range  Outcome: Progressing     Problem: Fall Injury Risk  Goal: Absence of Fall and Fall-Related Injury  Outcome: Progressing     Problem: Skin Injury Risk Increased  Goal: Skin Health and Integrity  Outcome: Progressing     Problem: Surgery Nonspecified  Goal: Absence of Bleeding  Outcome: Progressing  Goal: Effective Bowel Elimination  Outcome: Progressing  Goal: Fluid and Electrolyte Balance  Outcome: Progressing  Goal: Blood Glucose Level Within Targeted Range  Outcome: Progressing  Goal: Absence of Infection Signs and Symptoms  Outcome: Progressing  Goal: Anesthesia/Sedation Recovery  Outcome: Progressing  Goal: Optimal Pain Control and Function  Outcome: Progressing  Goal: Nausea and Vomiting Relief  Outcome: Progressing  Goal:  Effective Urinary Elimination  Outcome: Progressing  Goal: Effective Oxygenation and Ventilation  Outcome: Progressing

## 2024-11-28 NOTE — PLAN OF CARE
Problem: Adult Inpatient Plan of Care  Goal: Plan of Care Review  Outcome: Progressing     VIRTUAL NURSE:  Cued into patient's room.  Permission received per patient to turn camera to view patient.  Introduced as VN for night shift that will be working with floor nurse and nursing assistant.  Educated patient on VN's role in patient care and  [facility] model.  Plan of care reviewed with patient.  Education per flowsheet.   Informed patient that staff will round on them every 2 hours but to use call light for any other needs they may have; informed of fall risk and fall precautions.  Patient verbalized understanding.  Call light within reach; bed siderails up x3.  Opportunity given for questions and questions answered.  Admission assessment questions answered.  Patient denies complaints or any needs at this time. Instructed to call for assistance.  Will cont to monitor and intervene as needed.    Labs, notes, orders, and careplan initiated.       11/27/24 2004   Patient Request   Patient Requested no complaints or needs currently; REBECA Canela and Estrella PENA RN at bedside   Admission   Initial VN Admission Questions Complete   Communication Issues? Technical Issue   Shift   Virtual Nurse - Rounding Complete   Virtual Nurse - Patient Verbalized Approval Of Camera Use;VN Rounding   Type of Frequent Check   Type Patient Rounds   Safety/Activity   Patient Rounds bed in low position;placement of personal items at bedside;bed wheels locked;call light in patient/parent reach;visualized patient;clutter free environment maintained   Safety Promotion/Fall Prevention assistive device/personal item within reach;bed alarm set;commode/urinal/bedpan at bedside;high risk medications identified;Fall Risk reviewed with patient/family;diversional activities provided;medications reviewed;nonskid shoes/socks when out of bed;room near unit station;side rails raised x 3;supervised activity;Supervised toileting - stay within arms  reach;instructed to call staff for mobility   Safety Precautions emergency equipment at bedside   Positioning   Body Position neutral body alignment;neutral head position;position changed independently   Head of Bed (HOB) Positioning HOB at 60-90 degrees   Positioning/Transfer Devices pillows;applied  (under left leg for elevation)   Pain/Comfort/Sleep   Preferred Pain Scale number (Numeric Rating Pain Scale)   Comfort/Acceptable Pain Level 0   Pain Rating (0-10): Rest 0   Sleep/Rest/Relaxation no problem identified;awake

## 2024-11-28 NOTE — PLAN OF CARE
Problem: Occupational Therapy  Goal: Occupational Therapy Goal  Description: Goals to be met by: 12/28     Patient will increase functional independence with ADLs by performing:    LE Dressing with Stand-by Assistance and Assistive Devices as needed.  Toileting from toilet with Stand-by Assistance for hygiene and clothing management.   Toilet transfer to toilet with Stand-by Assistance and maintaining weight-bearing precaution(s).    Outcome: Progressing   Pt found in supine & agreeable to OT eval/tx this date.  Pt w/o c/o pain & perf the following:  -sup-->EOB w/ S-SBA & maintained w/ Sup  -standing via RW w/ EOB partially elevated 2/2 pt's tall stature w/ CGA for hopping laterally to R w/ Min A for DME mgmt  -returned to sitting EOB w/ CGA  Edu/tx re: ortho precautions, rec TTB, general safety techs & HEP. Pt verbalized understanding.  Pt left sitting EOB w/ PT.    Pt presents w/ decreased overall endurance/conditioning, balance/mobility & coordination w/ subsequent decline in (I)/safety w/ BADLs, fxnl mobility & fxnl t/f's.  OT 3x/wk to increase phys/fxnl status & maximize potential to achieve established goals for d/c-->low intensity tx w/ rec TTB & reacher.

## 2024-11-29 LAB
ACID FAST MOD KINY STN SPEC: NORMAL
ANION GAP SERPL CALC-SCNC: 11 MMOL/L (ref 8–16)
BUN SERPL-MCNC: 17 MG/DL (ref 6–20)
CALCIUM SERPL-MCNC: 9 MG/DL (ref 8.7–10.5)
CHLORIDE SERPL-SCNC: 107 MMOL/L (ref 95–110)
CO2 SERPL-SCNC: 22 MMOL/L (ref 23–29)
CREAT SERPL-MCNC: 0.8 MG/DL (ref 0.5–1.4)
CREAT SERPL-MCNC: 1 MG/DL (ref 0.5–1.4)
EST. GFR  (NO RACE VARIABLE): >60 ML/MIN/1.73 M^2
EST. GFR  (NO RACE VARIABLE): >60 ML/MIN/1.73 M^2
GLUCOSE SERPL-MCNC: 217 MG/DL (ref 70–110)
MYCOBACTERIUM SPEC QL CULT: NORMAL
POCT GLUCOSE: 164 MG/DL (ref 70–110)
POCT GLUCOSE: 165 MG/DL (ref 70–110)
POCT GLUCOSE: 168 MG/DL (ref 70–110)
POCT GLUCOSE: 199 MG/DL (ref 70–110)
POTASSIUM SERPL-SCNC: 3.7 MMOL/L (ref 3.5–5.1)
SODIUM SERPL-SCNC: 140 MMOL/L (ref 136–145)
VANCOMYCIN TROUGH SERPL-MCNC: 21.5 UG/ML (ref 10–22)

## 2024-11-29 PROCEDURE — 25000003 PHARM REV CODE 250: Mod: HCNC | Performed by: INTERNAL MEDICINE

## 2024-11-29 PROCEDURE — 97530 THERAPEUTIC ACTIVITIES: CPT | Mod: HCNC

## 2024-11-29 PROCEDURE — 94761 N-INVAS EAR/PLS OXIMETRY MLT: CPT | Mod: HCNC

## 2024-11-29 PROCEDURE — 99900035 HC TECH TIME PER 15 MIN (STAT): Mod: HCNC

## 2024-11-29 PROCEDURE — 25000003 PHARM REV CODE 250: Mod: HCNC | Performed by: PODIATRIST

## 2024-11-29 PROCEDURE — 63600175 PHARM REV CODE 636 W HCPCS: Mod: HCNC | Performed by: PODIATRIST

## 2024-11-29 PROCEDURE — 36415 COLL VENOUS BLD VENIPUNCTURE: CPT | Mod: HCNC | Performed by: INTERNAL MEDICINE

## 2024-11-29 PROCEDURE — 82565 ASSAY OF CREATININE: CPT | Mod: HCNC | Performed by: PODIATRIST

## 2024-11-29 PROCEDURE — 11000001 HC ACUTE MED/SURG PRIVATE ROOM: Mod: HCNC

## 2024-11-29 PROCEDURE — 63600175 PHARM REV CODE 636 W HCPCS: Mod: HCNC | Performed by: INTERNAL MEDICINE

## 2024-11-29 PROCEDURE — 80048 BASIC METABOLIC PNL TOTAL CA: CPT | Mod: HCNC | Performed by: INTERNAL MEDICINE

## 2024-11-29 PROCEDURE — 80202 ASSAY OF VANCOMYCIN: CPT | Mod: HCNC | Performed by: PODIATRIST

## 2024-11-29 PROCEDURE — 97116 GAIT TRAINING THERAPY: CPT | Mod: HCNC,CQ

## 2024-11-29 PROCEDURE — 36415 COLL VENOUS BLD VENIPUNCTURE: CPT | Mod: HCNC | Performed by: PODIATRIST

## 2024-11-29 RX ADMIN — INSULIN ASPART 2 UNITS: 100 INJECTION, SOLUTION INTRAVENOUS; SUBCUTANEOUS at 12:11

## 2024-11-29 RX ADMIN — INSULIN ASPART 2 UNITS: 100 INJECTION, SOLUTION INTRAVENOUS; SUBCUTANEOUS at 04:11

## 2024-11-29 RX ADMIN — ENOXAPARIN SODIUM 40 MG: 40 INJECTION SUBCUTANEOUS at 04:11

## 2024-11-29 RX ADMIN — VANCOMYCIN HYDROCHLORIDE 1500 MG: 1.5 INJECTION, POWDER, LYOPHILIZED, FOR SOLUTION INTRAVENOUS at 11:11

## 2024-11-29 RX ADMIN — METRONIDAZOLE 500 MG: 500 TABLET ORAL at 02:11

## 2024-11-29 RX ADMIN — INSULIN ASPART 2 UNITS: 100 INJECTION, SOLUTION INTRAVENOUS; SUBCUTANEOUS at 05:11

## 2024-11-29 RX ADMIN — METRONIDAZOLE 500 MG: 500 TABLET ORAL at 09:11

## 2024-11-29 RX ADMIN — VANCOMYCIN HYDROCHLORIDE 1750 MG: 500 INJECTION, POWDER, LYOPHILIZED, FOR SOLUTION INTRAVENOUS at 09:11

## 2024-11-29 RX ADMIN — CEFEPIME 2 G: 1 INJECTION, POWDER, FOR SOLUTION INTRAMUSCULAR; INTRAVENOUS at 06:11

## 2024-11-29 RX ADMIN — METRONIDAZOLE 500 MG: 500 TABLET ORAL at 05:11

## 2024-11-29 RX ADMIN — CEFEPIME 2 G: 1 INJECTION, POWDER, FOR SOLUTION INTRAMUSCULAR; INTRAVENOUS at 12:11

## 2024-11-29 RX ADMIN — INSULIN ASPART 1 UNITS: 100 INJECTION, SOLUTION INTRAVENOUS; SUBCUTANEOUS at 09:11

## 2024-11-29 RX ADMIN — CEFEPIME 2 G: 1 INJECTION, POWDER, FOR SOLUTION INTRAMUSCULAR; INTRAVENOUS at 03:11

## 2024-11-29 RX ADMIN — PANTOPRAZOLE SODIUM 40 MG: 40 TABLET, DELAYED RELEASE ORAL at 09:11

## 2024-11-29 RX ADMIN — INSULIN GLARGINE 15 UNITS: 100 INJECTION, SOLUTION SUBCUTANEOUS at 09:11

## 2024-11-29 NOTE — SUBJECTIVE & OBJECTIVE
Subjective:     Interval History: Patient resting on his side in bed upon our arrival. There was tape and an ABD pad applied over the plantar surface of the patients bandaged foot which was uncovering significant strikethrough. Patient states he has had minimal pain. JAGRUTI YBARRA, denies N/V/C/F.      Follow-up For: Procedure(s) (LRB):  IRRIGATION AND DEBRIDEMENT (Left)  APPLICATION, EXTERNAL FIXATION SYSTEM, MULTIPLANAR, WITH IMAGING GUIDANCE (Left)    Post-Operative Day: 2 Days Post-Op    Scheduled Meds:   ceFEPime IV (PEDS and ADULTS)  2 g Intravenous Q8H    enoxparin  40 mg Subcutaneous Daily    insulin glargine U-100  15 Units Subcutaneous QHS    metroNIDAZOLE  500 mg Oral Q8H    pantoprazole  40 mg Oral Daily    vancomycin (VANCOCIN) IV (PEDS and ADULTS)  1,750 mg Intravenous Q12H     Continuous Infusions:  PRN Meds:  Current Facility-Administered Medications:     acetaminophen, 650 mg, Oral, Q4H PRN    dextrose 10%, 12.5 g, Intravenous, PRN    dextrose 10%, 25 g, Intravenous, PRN    glucagon (human recombinant), 1 mg, Intramuscular, PRN    glucose, 16 g, Oral, PRN    glucose, 24 g, Oral, PRN    HYDROmorphone, 1 mg, Intravenous, Q3H PRN    influenza, 0.5 mL, Intramuscular, vaccine x 1 dose    insulin aspart U-100, 0-10 Units, Subcutaneous, QID (AC + HS) PRN    ondansetron, 4 mg, Intravenous, Q8H PRN    oxyCODONE-acetaminophen, 1 tablet, Oral, Q4H PRN    oxyCODONE-acetaminophen, 1 tablet, Oral, Q4H PRN    sodium chloride 0.9%, 10 mL, Intravenous, PRN    Pharmacy to dose Vancomycin consult, , , Once **AND** vancomycin - pharmacy to dose, , Intravenous, pharmacy to manage frequency    Review of Systems   Constitutional:  Negative for chills, diaphoresis, fatigue and fever.   Respiratory:  Negative for cough, chest tightness, shortness of breath and wheezing.    Cardiovascular:  Negative for chest pain and leg swelling.   Gastrointestinal:  Negative for abdominal pain, constipation, diarrhea, nausea and vomiting.    Skin:  Positive for wound.     Objective:     Vital Signs (Most Recent):  Temp: 98.2 °F (36.8 °C) (11/29/24 1122)  Pulse: 100 (11/29/24 1122)  Resp: 20 (11/29/24 1122)  BP: 127/75 (11/29/24 1122)  SpO2: 99 % (11/29/24 1122) Vital Signs (24h Range):  Temp:  [97.5 °F (36.4 °C)-99.8 °F (37.7 °C)] 98.2 °F (36.8 °C)  Pulse:  [] 100  Resp:  [16-20] 20  SpO2:  [96 %-100 %] 99 %  BP: (106-148)/(64-79) 127/75     Weight: 98.1 kg (216 lb 4.3 oz)  Body mass index is 28.53 kg/m².    Foot Exam    General  General Appearance: appears stated age and healthy   Orientation: alert and oriented to person, place, and time   Affect: appropriate       Left Foot/Ankle      Comments  External fixation intact to left lower extremity, dressing with significant strikethrough noted.     Laboratory:  A1C:   Recent Labs   Lab 08/08/24  1335 10/16/24  1221   HGBA1C >14.0* 11.0*     BMP:   Recent Labs   Lab 11/29/24  1006   *      K 3.7      CO2 22*   BUN 17   CREATININE 1.0   CALCIUM 9.0     CBC:   Recent Labs   Lab 11/28/24  0520   WBC 7.13   RBC 3.57*   HGB 9.8*   HCT 32.3*      MCV 91   MCH 27.5   MCHC 30.3*     CMP:   Recent Labs   Lab 11/25/24  1423 11/28/24  0520 11/29/24  1006   GLU 91   < > 217*   CALCIUM 8.8   < > 9.0   ALBUMIN 2.7*  --   --    PROT 7.4  --   --       < > 140   K 3.8   < > 3.7   CO2 25   < > 22*      < > 107   BUN 11   < > 17   CREATININE 0.9   < > 1.0   ALKPHOS 189*  --   --    ALT 12  --   --    AST 16  --   --    BILITOT 0.4  --   --     < > = values in this interval not displayed.     LFTs:   Recent Labs   Lab 11/25/24  1423   ALT 12   AST 16   ALKPHOS 189*   BILITOT 0.4   PROT 7.4   ALBUMIN 2.7*     Wound Cultures:   Recent Labs   Lab 06/25/24  1630 07/05/24  0910 10/11/24  0925 11/27/24  1516   LABAERO No growth No significant isolate PSEUDOMONAS AERUGINOSA  Moderate  * No growth     Microbiology Results (last 7 days)       Procedure Component Value Units Date/Time     Culture, Anaerobe [[id number]] Collected: 11/27/24 1516    Order Status: Completed Specimen: Bone from Foot, Left Updated: 11/29/24 1040     Anaerobic Culture Culture in progress    Narrative:      Left foot calcaneous    AFB Culture & Smear [[id number]] Collected: 11/27/24 1516    Order Status: Completed Specimen: Bone from Foot, Left Updated: 11/28/24 2127     AFB Culture & Smear Culture in progress    Narrative:      Left foot calcaneous    Aerobic culture [[id number]] Collected: 11/27/24 1516    Order Status: Completed Specimen: Bone from Foot, Left Updated: 11/28/24 0656     Aerobic Bacterial Culture No growth    Narrative:      Left foot calcaneous    Gram stain [[id number]] Collected: 11/27/24 1516    Order Status: Completed Specimen: Bone from Foot, Left Updated: 11/27/24 2136     Gram Stain Result No WBC's      No organisms seen    Narrative:      Left foot calcaneous    Fungus culture [[id number]] Collected: 11/27/24 1516    Order Status: Sent Specimen: Bone from Foot, Left Updated: 11/27/24 1923    Culture, Anaerobe [[id number]]     Order Status: Canceled Specimen: Bone from Foot, Left     Aerobic culture [[id number]]     Order Status: Canceled Specimen: Bone from Foot, Left     Fungus culture [[id number]]     Order Status: Canceled Specimen: Bone from Foot, Left     AFB Culture & Smear [[id number]]     Order Status: Canceled Specimen: Bone from Foot, Left     Gram stain [[id number]]     Order Status: Canceled Specimen: Bone from Foot, Left           Recent Labs   Lab 11/25/24  1348   COLORU Yellow   SPECGRAV 1.030   PHUR 6.0   PROTEINUA Negative   BACTERIA Rare   NITRITE Negative   LEUKOCYTESUR Negative     All pertinent labs reviewed within the last 24 hours.    Diagnostic Results:  I have reviewed all pertinent imaging results/findings within the past 24 hours.

## 2024-11-29 NOTE — PT/OT/SLP PROGRESS
Occupational Therapy   Treatment    Name: Indio Ryder Jr.  MRN: [id number]  Admitting Diagnosis:  Chronic osteomyelitis of left foot  2 Days Post-Op  Pre-op Diagnosis: Chronic multifocal osteomyelitis of left foot [M86.372]  Subluxation of tarsal joint of foot, left, sequela [S93.312S]  Type 2 diabetes mellitus with Charcot joint arthropathy [E11.610]  Equinus contracture of left ankle [M24.572]  Exostosis of bone of ankle [M89.8X7]  Acquired varus deformity of foot, left [M21.172] s/p Procedure(s):  IRRIGATION AND DEBRIDEMENT  APPLICATION, EXTERNAL FIXATION SYSTEM, MULTIPLANAR, WITH IMAGING GUIDANCE   Iagp    Recommendations:     Discharge Recommendations:    Discharge Equipment Recommendations:  bath bench, other (see comments), wheelchair (reacher)  Barriers to discharge:  Decreased caregiver support    Assessment:     Indio Ryder Jr. is a 54 y.o. male with a medical diagnosis of Chronic osteomyelitis of left foot.  He presents with LLE external fixator, NWB precautions. No pain reported. Pt declined toileting. BSC tf trn Min A sit<>stand and CGA tf with B axillary crutches; pt ambulated 5ft x 2 CGA with B axillary crutches. Pt did excellent maintain NWB on LLE. IV leaking, nurse notified. Pt would benefit from raised toilet seat insert with bilateral handles to increase Indep and provide leverage for Independent stand <>stand and safety at home. Continue with OT POC. Performance deficits affecting function are weakness, impaired endurance, impaired sensation, impaired self care skills, impaired functional mobility, gait instability, impaired balance, decreased coordination, decreased upper extremity function, decreased lower extremity function, decreased safety awareness, decreased ROM, impaired skin, edema, orthopedic precautions.     Rehab Prognosis:  Good; patient would benefit from acute skilled OT services to address these deficits and reach maximum level of function.       Plan:     Patient to be  seen 3 x/week to address the above listed problems via self-care/home management, therapeutic activities, therapeutic exercises  Plan of Care Expires: 12/28/24  Plan of Care Reviewed with: patient    Subjective     Chief Complaint: none  Patient/Family Comments/goals: pt agreeable to txfr training. Declined trace for toileting at time.   Pain/Comfort:  Pain Rating 1: 0/10  Pain Rating Post-Intervention 1: 0/10    Objective:     Communicated with: nurse prior to session.  Patient found up in chair with external fixator, peripheral IV upon OT entry to room.    General Precautions: Standard, fall, diabetic    Orthopedic Precautions:LLE non weight bearing  Braces:  (external fixator LLE)  Respiratory Status: Room air     Occupational Performance:       Functional Mobility/Transfers:  Patient completed Sit <> Stand Transfer with minimum assistance  with  axillary crutches. Instruction vc and demonstration placement crutches on L side to stand and push off using RUE on armrest of seat and sit down; transfer crutch to Right arm after standing and back to left side before sitting down.Pt needed CGA steadying assist when performing.  Patient completed Toilet Transfer Step Transfer technique with contact guard assistance with  bedside commode and axillary crutches  Functional Mobility: ambulated 5ft x 2 to BSC and back using  B axillary crutches; pt maintained NWB on LLE.     [facility] 6 Click ADL: 21    Treatment & Education:  Purpose of OT and POC  Pt seen for safe fx mobility retraining as stated above.  Pt instructed in use of raised toilet seat insert with handles. He says he has access to one that his grandmother has at home. Purchase place options discussed also. He verbalized understanding.  All questions;/concerns addressed within scope.   Nurse notified of UV leaking; pt arm wrapped in towel until her arrival.     Patient left up in chair with all lines intact, call button in reach, and nurse notified    GOALS:    Multidisciplinary Problems       Occupational Therapy Goals          Problem: Occupational Therapy    Goal Priority Disciplines Outcome Interventions   Occupational Therapy Goal     OT, PT/OT Progressing    Description: Goals to be met by: 12/28     Patient will increase functional independence with ADLs by performing:    LE Dressing with Stand-by Assistance and Assistive Devices as needed.  Toileting from toilet with Stand-by Assistance for hygiene and clothing management.   Toilet transfer to toilet with Stand-by Assistance and maintaining weight-bearing precaution(s).                         Time Tracking:     OT Date of Treatment: 11/29/24  OT Start Time: 1318  OT Stop Time: 1332  OT Total Time (min): 14 min    Billable Minutes:Therapeutic Activity 14  Total Time 14    OT/NINFA: OT          11/29/2024

## 2024-11-29 NOTE — PT/OT/SLP PROGRESS
Physical Therapy Treatment    Patient Name:  Indio Ryder Jr.   MRN:  [id number]    Recommendations:     Discharge Recommendations: Low Intensity Therapy  Discharge Equipment Recommendations: crutches, wheelchair (crutches in pt's room)  Barriers to discharge:  decreased mobility,strength and endurance    Assessment:     Indio Ryder Jr. is a 54 y.o. male admitted with a medical diagnosis of Chronic osteomyelitis of left foot.  He presents with the following impairments/functional limitations: weakness, impaired endurance, impaired self care skills, impaired functional mobility, gait instability, impaired balance, decreased coordination, decreased lower extremity function, decreased safety awareness,pt with improving mobility and requires CGA with crutches,pt will benefit from low intensity therapy upon discharge.    Rehab Prognosis: Good; patient would benefit from acute skilled PT services to address these deficits and reach maximum level of function.    Recent Surgery: Procedure(s) (LRB):  IRRIGATION AND DEBRIDEMENT (Left)  APPLICATION, EXTERNAL FIXATION SYSTEM, MULTIPLANAR, WITH IMAGING GUIDANCE (Left) 2 Days Post-Op    Plan:     During this hospitalization, patient to be seen 6 x/week to address the identified rehab impairments via gait training, therapeutic activities, therapeutic exercises, neuromuscular re-education and progress toward the following goals:    Plan of Care Expires:  12/28/24    Subjective     Chief Complaint: n/a  Patient/Family Comments/goals: pt agreeable to rx.  Pain/Comfort:  Pain Rating 1: 0/10      Objective:     Communicated with nsg prior to session.  Patient found supine with external fixator, peripheral IV upon PT entry to room.     General Precautions: Standard, fall  Orthopedic Precautions: LLE non weight bearing  Braces: N/A  Respiratory Status: Room air     Functional Mobility:  Bed Mobility:     Supine to Sit: modified independence  Sit to Supine: modified  independence  Transfers:     Sit to Stand:  contact guard assistance with axillary crutches  Gait: amb ~25' with crutches and CGA NWB L le  Balance: good sitting balance      AM-PAC 6 CLICK MOBILITY  Turning over in bed (including adjusting bedclothes, sheets and blankets)?: 3  Sitting down on and standing up from a chair with arms (e.g., wheelchair, bedside commode, etc.): 3  Moving from lying on back to sitting on the side of the bed?: 4  Moving to and from a bed to a chair (including a wheelchair)?: 3  Need to walk in hospital room?: 2  Climbing 3-5 steps with a railing?: 1  Basic Mobility Total Score: 16       Treatment & Education:       Patient left supine with all lines intact and call button in reach..    GOALS: see general POC  Multidisciplinary Problems       Physical Therapy Goals          Problem: Physical Therapy    Goal Priority Disciplines Outcome Interventions   Physical Therapy Goal     PT, PT/OT Progressing    Description: Goals to be met by: 2024     Patient will increase functional independence with mobility by performin. Sit to stand transfer with Modified [place]  2. Gait  x 150 feet with Modified [place] using Crutches.   3. Ascend/Descend 8 inch curb step with Modified [place] using Crutches.  5. Stand for 15 minutes with Modified [place] using Crutches                         Time Tracking:     PT Received On: 24  PT Start Time: 1024     PT Stop Time: 1038  PT Total Time (min): 14 min     Billable Minutes: Gait Training 14    Treatment Type: Treatment  PT/PTA: PTA     Number of PTA visits since last PT visit: 1     2024

## 2024-11-29 NOTE — PROGRESS NOTES
Progress Note  [facility]    ASSESSMENT:   Infected diabetic foot infection with Charcot joint.  Prior cultures with Pseudomonas, Klebsiella aerogenes.  Currently on vancomycin, cefepime, metronidazole.  Cultures in process.  Diabetes, hypertension, hyperlipidemia, neuropathy      RECOMMENDATIONS:   Can continue vancomycin, cefepime, metronidazole for the moment  Follow up surgical cultures  Anticipate prolonged therapy  Monitor patient progress      Thanks for this consult!. Please call if you have any questions.  Av Gregorio  [facility]  [phone] pager    INTERVAL HISTORY:   Patient without complaints.  Sitting in chair    Medications reviewed; current antibiotics:  Vancomycin  Cefepime  Metronidazole    Review of Systems:  Review of Systems   Musculoskeletal:         Wound on left foot in external fixator   All other systems reviewed and are negative.      OBJECTIVE:     Vital Signs (Most Recent)  Temp: 98.6 °F (37 °C) (11/29/24 0743)  Pulse: 101 (11/29/24 0743)  Resp: 20 (11/29/24 0743)  BP: (!) 143/76 (11/29/24 0743)  SpO2: 96 % (11/29/24 0805)    Temperature Range Min/Max (Last 24H):  Temp:  [97.5 °F (36.4 °C)-99.8 °F (37.7 °C)]     Physical Exam:  Physical Exam  Vitals and nursing note reviewed.   HENT:      Head: Normocephalic and atraumatic.   Musculoskeletal:      Comments: Right toe amputations with decreased pedal pulses.  Left foot in external fixator with wrapped to upper calf.   Neurological:      Mental Status: He is alert.         Laboratory:  Recent Labs   Lab 11/25/24  1423 11/28/24  0520   WBC 5.43 7.13   HGB 10.3* 9.8*   HCT 34.7* 32.3*    290    140   K 3.8 3.8    106   CO2 25 23   BUN 11 18   CREATININE 0.9 0.9   AST 16  --    ALT 12  --    ALKPHOS 189*  --    BILITOT 0.4  --      Labs: Reviewed    Microbiology:  Left foot bone cultures 11/27/2024 in process.  No growth so far  Other Diagnostic Results:  Reviewed    ASSESSMENT/PLAN:     Active Hospital  Problems    Diagnosis  POA    *Chronic osteomyelitis of left foot [M86.672]  Yes    Acquired varus deformity of foot, left [M21.172]  Yes    Exostosis of bone of ankle [M89.8X7]  Yes    Equinus contracture of left ankle [M24.572]  Yes    Subluxation of tarsal joint of foot, left, sequela [S93.312S]  Not Applicable    Type 2 diabetes mellitus with Charcot joint arthropathy [E11.610]  Yes    Subacute osteomyelitis of left foot [M86.272]  Yes    Type 2 diabetes mellitus with hyperglycemia, with long-term current use of insulin [E11.65, Z79.4]  Not Applicable    Mixed hyperlipidemia [E78.2]  Yes    Essential hypertension [I10]  Yes      Resolved Hospital Problems   No resolved problems to display.

## 2024-11-29 NOTE — ASSESSMENT & PLAN NOTE
POD#2: Pending intra-op bone c&s and pathology from the calcaneus. The cuboid was excised and bioactive glass/antibiotic eluding cement implanted. Ex-fix left lower extremity well aligned and intact. IV antibiotic therapy per ID recommendations. Anticipate 3-4 days prior to final c&s and antibiotic recommendations made. Will need HH upon discharge with possible PICC line. NWB left lower extremity for now but may apply partial weight for transfers. Elevate above heart level at rest.     -Dressing taken down, leg and foot washed, and dressing re-applied (covering each pin site with xeroform and gauze, plantar wound with hydrofera, then dressing the leg in Chay, and ACE bandage)

## 2024-11-29 NOTE — PROGRESS NOTES
[facility] Surg  Podiatry  Progress Note    Patient Name: Indio Ryder Jr.  MRN: [id number]  Admission Date: 11/27/2024  Hospital Length of Stay: 2 days  Attending Physician: Daniel Reyes DPM  Primary Care Provider: Lorena Thakur MD     Subjective:     Interval History: Patient resting on his side in bed upon our arrival. There was tape and an ABD pad applied over the plantar surface of the patients bandaged foot which was uncovering significant strikethrough. Patient states he has had minimal pain. AMADA, JAGRUTI, denies N/V/C/F.      Follow-up For: Procedure(s) (LRB):  IRRIGATION AND DEBRIDEMENT (Left)  APPLICATION, EXTERNAL FIXATION SYSTEM, MULTIPLANAR, WITH IMAGING GUIDANCE (Left)    Post-Operative Day: 2 Days Post-Op    Scheduled Meds:   ceFEPime IV (PEDS and ADULTS)  2 g Intravenous Q8H    enoxparin  40 mg Subcutaneous Daily    insulin glargine U-100  15 Units Subcutaneous QHS    metroNIDAZOLE  500 mg Oral Q8H    pantoprazole  40 mg Oral Daily    vancomycin (VANCOCIN) IV (PEDS and ADULTS)  1,750 mg Intravenous Q12H     Continuous Infusions:  PRN Meds:  Current Facility-Administered Medications:     acetaminophen, 650 mg, Oral, Q4H PRN    dextrose 10%, 12.5 g, Intravenous, PRN    dextrose 10%, 25 g, Intravenous, PRN    glucagon (human recombinant), 1 mg, Intramuscular, PRN    glucose, 16 g, Oral, PRN    glucose, 24 g, Oral, PRN    HYDROmorphone, 1 mg, Intravenous, Q3H PRN    influenza, 0.5 mL, Intramuscular, vaccine x 1 dose    insulin aspart U-100, 0-10 Units, Subcutaneous, QID (AC + HS) PRN    ondansetron, 4 mg, Intravenous, Q8H PRN    oxyCODONE-acetaminophen, 1 tablet, Oral, Q4H PRN    oxyCODONE-acetaminophen, 1 tablet, Oral, Q4H PRN    sodium chloride 0.9%, 10 mL, Intravenous, PRN    Pharmacy to dose Vancomycin consult, , , Once **AND** vancomycin - pharmacy to dose, , Intravenous, pharmacy to manage frequency    Review of Systems   Constitutional:  Negative for chills, diaphoresis, fatigue and  fever.   Respiratory:  Negative for cough, chest tightness, shortness of breath and wheezing.    Cardiovascular:  Negative for chest pain and leg swelling.   Gastrointestinal:  Negative for abdominal pain, constipation, diarrhea, nausea and vomiting.   Skin:  Positive for wound.     Objective:     Vital Signs (Most Recent):  Temp: 98.2 °F (36.8 °C) (11/29/24 1122)  Pulse: 100 (11/29/24 1122)  Resp: 20 (11/29/24 1122)  BP: 127/75 (11/29/24 1122)  SpO2: 99 % (11/29/24 1122) Vital Signs (24h Range):  Temp:  [97.5 °F (36.4 °C)-99.8 °F (37.7 °C)] 98.2 °F (36.8 °C)  Pulse:  [] 100  Resp:  [16-20] 20  SpO2:  [96 %-100 %] 99 %  BP: (106-148)/(64-79) 127/75     Weight: 98.1 kg (216 lb 4.3 oz)  Body mass index is 28.53 kg/m².    Foot Exam    General  General Appearance: appears stated age and healthy   Orientation: alert and oriented to person, place, and time   Affect: appropriate       Left Foot/Ankle      Comments  External fixation intact to left lower extremity, dressing with significant strikethrough noted.     Laboratory:  A1C:   Recent Labs   Lab 08/08/24  1335 10/16/24  1221   HGBA1C >14.0* 11.0*     BMP:   Recent Labs   Lab 11/29/24  1006   *      K 3.7      CO2 22*   BUN 17   CREATININE 1.0   CALCIUM 9.0     CBC:   Recent Labs   Lab 11/28/24  0520   WBC 7.13   RBC 3.57*   HGB 9.8*   HCT 32.3*      MCV 91   MCH 27.5   MCHC 30.3*     CMP:   Recent Labs   Lab 11/25/24  1423 11/28/24  0520 11/29/24  1006   GLU 91   < > 217*   CALCIUM 8.8   < > 9.0   ALBUMIN 2.7*  --   --    PROT 7.4  --   --       < > 140   K 3.8   < > 3.7   CO2 25   < > 22*      < > 107   BUN 11   < > 17   CREATININE 0.9   < > 1.0   ALKPHOS 189*  --   --    ALT 12  --   --    AST 16  --   --    BILITOT 0.4  --   --     < > = values in this interval not displayed.     LFTs:   Recent Labs   Lab 11/25/24  1423   ALT 12   AST 16   ALKPHOS 189*   BILITOT 0.4   PROT 7.4   ALBUMIN 2.7*     Wound Cultures:   Recent  Labs   Lab 06/25/24  1630 07/05/24  0910 10/11/24  0925 11/27/24  1516   LABAERO No growth No significant isolate PSEUDOMONAS AERUGINOSA  Moderate  * No growth     Microbiology Results (last 7 days)       Procedure Component Value Units Date/Time    Culture, Anaerobe [[id number]] Collected: 11/27/24 1516    Order Status: Completed Specimen: Bone from Foot, Left Updated: 11/29/24 1040     Anaerobic Culture Culture in progress    Narrative:      Left foot calcaneous    AFB Culture & Smear [[id number]] Collected: 11/27/24 1516    Order Status: Completed Specimen: Bone from Foot, Left Updated: 11/28/24 2127     AFB Culture & Smear Culture in progress    Narrative:      Left foot calcaneous    Aerobic culture [[id number]] Collected: 11/27/24 1516    Order Status: Completed Specimen: Bone from Foot, Left Updated: 11/28/24 0656     Aerobic Bacterial Culture No growth    Narrative:      Left foot calcaneous    Gram stain [[id number]] Collected: 11/27/24 1516    Order Status: Completed Specimen: Bone from Foot, Left Updated: 11/27/24 2136     Gram Stain Result No WBC's      No organisms seen    Narrative:      Left foot calcaneous    Fungus culture [[id number]] Collected: 11/27/24 1516    Order Status: Sent Specimen: Bone from Foot, Left Updated: 11/27/24 1923    Culture, Anaerobe [[id number]]     Order Status: Canceled Specimen: Bone from Foot, Left     Aerobic culture [[id number]]     Order Status: Canceled Specimen: Bone from Foot, Left     Fungus culture [[id number]]     Order Status: Canceled Specimen: Bone from Foot, Left     AFB Culture & Smear [[id number]]     Order Status: Canceled Specimen: Bone from Foot, Left     Gram stain [[id number]]     Order Status: Canceled Specimen: Bone from Foot, Left           Recent Labs   Lab 11/25/24  1348   COLORU Yellow   SPECGRAV 1.030   PHUR 6.0   PROTEINUA Negative   BACTERIA Rare   NITRITE Negative   LEUKOCYTESUR Negative     All pertinent labs reviewed within the last 24  hours.    Diagnostic Results:  I have reviewed all pertinent imaging results/findings within the past 24 hours.  Assessment/Plan:     ID  * Chronic osteomyelitis of left foot  POD#2: Pending intra-op bone c&s and pathology from the calcaneus. The cuboid was excised and bioactive glass/antibiotic eluding cement implanted. Ex-fix left lower extremity well aligned and intact. IV antibiotic therapy per ID recommendations. Anticipate 3-4 days prior to final c&s and antibiotic recommendations made. Will need HH upon discharge with possible PICC line. NWB left lower extremity for now but may apply partial weight for transfers. Elevate above heart level at rest.     -Dressing taken down, leg and foot washed, and dressing re-applied (covering each pin site with xeroform and gauze, plantar wound with hydrofera, then dressing the leg in Chay, and ACE bandage)        Vamshi Matias MD  Podiatry  [facility]

## 2024-11-29 NOTE — PLAN OF CARE
[facility] - Med Surg  Initial Discharge Assessment       Primary Care Provider: Lorena Thakur MD    Admission Diagnosis: Chronic multifocal osteomyelitis of left foot [M86.372]  Subluxation of tarsal joint of foot, left, sequela [S93.312S]  Type 2 diabetes mellitus with Charcot joint arthropathy [E11.610]  Equinus contracture of left ankle [M24.572]  Exostosis of bone of ankle [M89.8X7]  Acquired varus deformity of foot, left [M21.172]  Osteomyelitis of left foot [M86.9]    Admission Date: 11/27/2024  Expected Discharge Date: 12/2/2024    Consult: pod, ID, & PT/OT    Payor: [employer] MEDICARE / Plan: [employer] MEDICARE HMO / Product Type: Capitation /     Extended Emergency Contact Information  Primary Emergency Contact: Cynthia White   [place]  Work Phone: [phone]  Relation: Sister  Secondary Emergency Contact: Roxi Velasco   [place]  Mobile Phone: [phone]  Relation: Sister    Discharge Plan A: (P) Home with family  Discharge Plan B: (P) Home Health      [facility]  [address] [address]  Phone: [phone] Fax: [phone]    [facility]  [address]  [address]   [facility] [address]  Phone: [phone] Fax: [phone]      Initial Assessment (most recent)       Adult Discharge Assessment - 11/29/24 1520          Discharge Assessment    Assessment Type Discharge Planning Assessment (P)      Confirmed/corrected address, phone number and insurance Yes (P)      Confirmed Demographics Correct on Facesheet (P)      Source of Information patient (P)      Communicated FRANCISCO with patient/caregiver Date not available/Unable to determine (P)      People in Home alone (P)      Do you expect to return to your current living situation? Yes (P)      Do you have help at home or someone to help you manage your care at home? Yes (P)      Prior to hospitilization cognitive status: Alert/Oriented (P)      Current cognitive  status: Alert/Oriented (P)      Equipment Currently Used at Home glucometer;other (see comments) (P)    knee scooter; pt has an ankle brace but it doesn't fit    Readmission within 30 days? No (P)      Patient currently being followed by outpatient case management? No (P)      Do you currently have service(s) that help you manage your care at home? No (P)      Do you take prescription medications? Yes (P)      Do you have prescription coverage? Yes (P)      Do you have any problems affording any of your prescribed medications? No (P)      Is the patient taking medications as prescribed? yes (P)      How do you get to doctors appointments? family or friend will provide (P)      Are you on dialysis? No (P)      Do you take coumadin? No (P)      Discharge Plan A Home with family (P)      Discharge Plan B Home Health (P)      DME Needed Upon Discharge  other (see comments) (P)    tbd    Discharge Plan discussed with: Patient (P)                       1520  Patient resting quietly in bed when CM rounded via [employer]. No family present. Patient was admitted with left foot osteomyelitis, is s/p left foot I&D with external fixator placement done by Dr Reyes on 11/27/2024, & continues to be followed by pod, ID, & PT/OT.    Patient lives alone, has equipment to assist with ADLs, & denied the need for assistance with transportation at time of discharge. Pt stated that he will discharge to his sister's house (Cynthia [address], [address], [phone]) & will return to the [facility] with Dr Reyes for wound care clinic appts. Pt stated that he has not receiving HH services in the past but has received home IV abx provided by [facility]. Pt in agreement to resume services if needed. Awaiting culture results.       Will continue to follow.

## 2024-11-29 NOTE — PLAN OF CARE
Problem: Physical Therapy  Goal: Physical Therapy Goal  Description: Goals to be met by: 2024     Patient will increase functional independence with mobility by performin. Sit to stand transfer with Modified [place]  2. Gait  x 150 feet with Modified [place] using Crutches.   3. Ascend/Descend 8 inch curb step with Modified [place] using Crutches.  5. Stand for 15 minutes with Modified [place] using Crutches    Outcome: Progressing

## 2024-11-29 NOTE — PLAN OF CARE
Pt AAOx4. Medications administered per order. LLE with external fixator, dressing CDI, elevated. Pt denies pain, discomfort, or nausea. BG monitored. Urinal at bedside. Encouraged to call with questions/concerns/assistance. Safety.

## 2024-11-30 LAB
ANION GAP SERPL CALC-SCNC: 7 MMOL/L (ref 8–16)
BASOPHILS # BLD AUTO: 0.03 K/UL (ref 0–0.2)
BASOPHILS NFR BLD: 0.4 % (ref 0–1.9)
BUN SERPL-MCNC: 13 MG/DL (ref 6–20)
CALCIUM SERPL-MCNC: 8.9 MG/DL (ref 8.7–10.5)
CHLORIDE SERPL-SCNC: 109 MMOL/L (ref 95–110)
CO2 SERPL-SCNC: 25 MMOL/L (ref 23–29)
CREAT SERPL-MCNC: 0.8 MG/DL (ref 0.5–1.4)
DIFFERENTIAL METHOD BLD: ABNORMAL
EOSINOPHIL # BLD AUTO: 0.1 K/UL (ref 0–0.5)
EOSINOPHIL NFR BLD: 1.5 % (ref 0–8)
ERYTHROCYTE [DISTWIDTH] IN BLOOD BY AUTOMATED COUNT: 14.5 % (ref 11.5–14.5)
EST. GFR  (NO RACE VARIABLE): >60 ML/MIN/1.73 M^2
GLUCOSE SERPL-MCNC: 98 MG/DL (ref 70–110)
HCT VFR BLD AUTO: 27.5 % (ref 40–54)
HGB BLD-MCNC: 8.5 G/DL (ref 14–18)
IMM GRANULOCYTES # BLD AUTO: 0.03 K/UL (ref 0–0.04)
IMM GRANULOCYTES NFR BLD AUTO: 0.4 % (ref 0–0.5)
LYMPHOCYTES # BLD AUTO: 1.7 K/UL (ref 1–4.8)
LYMPHOCYTES NFR BLD: 23.5 % (ref 18–48)
MCH RBC QN AUTO: 27.5 PG (ref 27–31)
MCHC RBC AUTO-ENTMCNC: 30.9 G/DL (ref 32–36)
MCV RBC AUTO: 89 FL (ref 82–98)
MONOCYTES # BLD AUTO: 0.8 K/UL (ref 0.3–1)
MONOCYTES NFR BLD: 10.4 % (ref 4–15)
NEUTROPHILS # BLD AUTO: 4.6 K/UL (ref 1.8–7.7)
NEUTROPHILS NFR BLD: 63.8 % (ref 38–73)
NRBC BLD-RTO: 0 /100 WBC
PLATELET # BLD AUTO: 245 K/UL (ref 150–450)
PMV BLD AUTO: 9.8 FL (ref 9.2–12.9)
POCT GLUCOSE: 106 MG/DL (ref 70–110)
POCT GLUCOSE: 120 MG/DL (ref 70–110)
POCT GLUCOSE: 182 MG/DL (ref 70–110)
POCT GLUCOSE: 214 MG/DL (ref 70–110)
POTASSIUM SERPL-SCNC: 3.2 MMOL/L (ref 3.5–5.1)
RBC # BLD AUTO: 3.09 M/UL (ref 4.6–6.2)
SODIUM SERPL-SCNC: 141 MMOL/L (ref 136–145)
WBC # BLD AUTO: 7.28 K/UL (ref 3.9–12.7)

## 2024-11-30 PROCEDURE — 25000003 PHARM REV CODE 250: Mod: HCNC | Performed by: INTERNAL MEDICINE

## 2024-11-30 PROCEDURE — 11000001 HC ACUTE MED/SURG PRIVATE ROOM: Mod: HCNC

## 2024-11-30 PROCEDURE — 99900035 HC TECH TIME PER 15 MIN (STAT): Mod: HCNC

## 2024-11-30 PROCEDURE — 85025 COMPLETE CBC W/AUTO DIFF WBC: CPT | Mod: HCNC | Performed by: INTERNAL MEDICINE

## 2024-11-30 PROCEDURE — 99024 POSTOP FOLLOW-UP VISIT: CPT | Mod: ,,, | Performed by: STUDENT IN AN ORGANIZED HEALTH CARE EDUCATION/TRAINING PROGRAM

## 2024-11-30 PROCEDURE — 36415 COLL VENOUS BLD VENIPUNCTURE: CPT | Mod: HCNC | Performed by: INTERNAL MEDICINE

## 2024-11-30 PROCEDURE — 63600175 PHARM REV CODE 636 W HCPCS: Mod: HCNC | Performed by: INTERNAL MEDICINE

## 2024-11-30 PROCEDURE — 94761 N-INVAS EAR/PLS OXIMETRY MLT: CPT | Mod: HCNC

## 2024-11-30 PROCEDURE — 25000003 PHARM REV CODE 250: Mod: HCNC | Performed by: PODIATRIST

## 2024-11-30 PROCEDURE — 80048 BASIC METABOLIC PNL TOTAL CA: CPT | Mod: HCNC | Performed by: INTERNAL MEDICINE

## 2024-11-30 PROCEDURE — 63600175 PHARM REV CODE 636 W HCPCS: Mod: HCNC | Performed by: PODIATRIST

## 2024-11-30 RX ORDER — AMOXICILLIN 250 MG
1 CAPSULE ORAL DAILY
Status: DISCONTINUED | OUTPATIENT
Start: 2024-11-30 | End: 2024-12-03 | Stop reason: HOSPADM

## 2024-11-30 RX ADMIN — INSULIN GLARGINE 15 UNITS: 100 INJECTION, SOLUTION SUBCUTANEOUS at 08:11

## 2024-11-30 RX ADMIN — CEFEPIME 2 G: 1 INJECTION, POWDER, FOR SOLUTION INTRAMUSCULAR; INTRAVENOUS at 11:11

## 2024-11-30 RX ADMIN — INSULIN ASPART 1 UNITS: 100 INJECTION, SOLUTION INTRAVENOUS; SUBCUTANEOUS at 09:11

## 2024-11-30 RX ADMIN — METRONIDAZOLE 500 MG: 500 TABLET ORAL at 01:11

## 2024-11-30 RX ADMIN — CEFEPIME 2 G: 1 INJECTION, POWDER, FOR SOLUTION INTRAMUSCULAR; INTRAVENOUS at 06:11

## 2024-11-30 RX ADMIN — SENNOSIDES AND DOCUSATE SODIUM 1 TABLET: 8.6; 5 TABLET ORAL at 02:11

## 2024-11-30 RX ADMIN — CEFEPIME 2 G: 1 INJECTION, POWDER, FOR SOLUTION INTRAMUSCULAR; INTRAVENOUS at 03:11

## 2024-11-30 RX ADMIN — METRONIDAZOLE 500 MG: 500 TABLET ORAL at 10:11

## 2024-11-30 RX ADMIN — VANCOMYCIN HYDROCHLORIDE 1500 MG: 1.5 INJECTION, POWDER, LYOPHILIZED, FOR SOLUTION INTRAVENOUS at 11:11

## 2024-11-30 RX ADMIN — ENOXAPARIN SODIUM 40 MG: 40 INJECTION SUBCUTANEOUS at 05:11

## 2024-11-30 RX ADMIN — INSULIN ASPART 4 UNITS: 100 INJECTION, SOLUTION INTRAVENOUS; SUBCUTANEOUS at 05:11

## 2024-11-30 RX ADMIN — METRONIDAZOLE 500 MG: 500 TABLET ORAL at 05:11

## 2024-11-30 RX ADMIN — PANTOPRAZOLE SODIUM 40 MG: 40 TABLET, DELAYED RELEASE ORAL at 08:11

## 2024-11-30 NOTE — SUBJECTIVE & OBJECTIVE
Subjective:     Interval History: Patient resting in bed upon my arrival with left leg appropriately elevated. Dressings C/D/I. Patient states he has had minimal pain. JAGRUTI YBARRA, denies N/V/C/F.      Follow-up For: Procedure(s) (LRB):  IRRIGATION AND DEBRIDEMENT (Left)  APPLICATION, EXTERNAL FIXATION SYSTEM, MULTIPLANAR, WITH IMAGING GUIDANCE (Left)    Post-Operative Day: 3 Days Post-Op    Scheduled Meds:   ceFEPime IV (PEDS and ADULTS)  2 g Intravenous Q8H    enoxparin  40 mg Subcutaneous Daily    insulin glargine U-100  15 Units Subcutaneous QHS    metroNIDAZOLE  500 mg Oral Q8H    pantoprazole  40 mg Oral Daily    vancomycin (VANCOCIN) IV (PEDS and ADULTS)  1,500 mg Intravenous Q12H     Continuous Infusions:  PRN Meds:  Current Facility-Administered Medications:     acetaminophen, 650 mg, Oral, Q4H PRN    dextrose 10%, 12.5 g, Intravenous, PRN    dextrose 10%, 25 g, Intravenous, PRN    glucagon (human recombinant), 1 mg, Intramuscular, PRN    glucose, 16 g, Oral, PRN    glucose, 24 g, Oral, PRN    HYDROmorphone, 1 mg, Intravenous, Q3H PRN    influenza, 0.5 mL, Intramuscular, vaccine x 1 dose    insulin aspart U-100, 0-10 Units, Subcutaneous, QID (AC + HS) PRN    ondansetron, 4 mg, Intravenous, Q8H PRN    oxyCODONE-acetaminophen, 1 tablet, Oral, Q4H PRN    oxyCODONE-acetaminophen, 1 tablet, Oral, Q4H PRN    sodium chloride 0.9%, 10 mL, Intravenous, PRN    Pharmacy to dose Vancomycin consult, , , Once **AND** vancomycin - pharmacy to dose, , Intravenous, pharmacy to manage frequency    Review of Systems   Constitutional:  Negative for chills, diaphoresis, fatigue and fever.   Respiratory:  Negative for cough, chest tightness, shortness of breath and wheezing.    Cardiovascular:  Negative for chest pain and leg swelling.   Gastrointestinal:  Negative for abdominal pain, constipation, diarrhea, nausea and vomiting.   Skin:  Positive for wound.     Objective:     Vital Signs (Most Recent):  Temp: 97.6 °F (36.4 °C)  (11/30/24 0729)  Pulse: 87 (11/30/24 0729)  Resp: 18 (11/30/24 0729)  BP: (!) 154/85 (11/30/24 0729)  SpO2: 99 % (11/30/24 0810) Vital Signs (24h Range):  Temp:  [97.6 °F (36.4 °C)-98.2 °F (36.8 °C)] 97.6 °F (36.4 °C)  Pulse:  [] 87  Resp:  [18-20] 18  SpO2:  [95 %-100 %] 99 %  BP: (127-154)/(75-85) 154/85     Weight: 98.1 kg (216 lb 4.3 oz)  Body mass index is 28.53 kg/m².    Foot Exam    General  General Appearance: appears stated age and healthy   Orientation: alert and oriented to person, place, and time   Affect: appropriate       Left Foot/Ankle      Comments  External fixation intact to left lower extremity, dressing C/D/I with no strikethrough noted.       Laboratory:  A1C:   Recent Labs   Lab 08/08/24  1335 10/16/24  1221   HGBA1C >14.0* 11.0*     BMP:   Recent Labs   Lab 11/30/24  0527   GLU 98      K 3.2*      CO2 25   BUN 13   CREATININE 0.8   CALCIUM 8.9     CBC:   Recent Labs   Lab 11/30/24  0527   WBC 7.28   RBC 3.09*   HGB 8.5*   HCT 27.5*      MCV 89   MCH 27.5   MCHC 30.9*     CMP:   Recent Labs   Lab 11/25/24  1423 11/28/24  0520 11/30/24  0527   GLU 91   < > 98   CALCIUM 8.8   < > 8.9   ALBUMIN 2.7*  --   --    PROT 7.4  --   --       < > 141   K 3.8   < > 3.2*   CO2 25   < > 25      < > 109   BUN 11   < > 13   CREATININE 0.9   < > 0.8   ALKPHOS 189*  --   --    ALT 12  --   --    AST 16  --   --    BILITOT 0.4  --   --     < > = values in this interval not displayed.     LFTs:   Recent Labs   Lab 11/25/24  1423   ALT 12   AST 16   ALKPHOS 189*   BILITOT 0.4   PROT 7.4   ALBUMIN 2.7*     Wound Cultures:   Recent Labs   Lab 06/25/24  1630 07/05/24  0910 10/11/24  0925 11/27/24  1516   LABAERO No growth No significant isolate PSEUDOMONAS AERUGINOSA  Moderate  * No growth     Microbiology Results (last 7 days)       Procedure Component Value Units Date/Time    AFB Culture & Smear [[id number]] Collected: 11/27/24 1516    Order Status: Completed Specimen: Bone from  Foot, Left Updated: 11/29/24 1706     AFB Culture & Smear Culture in progress     AFB CULTURE STAIN No acid fast bacilli seen.    Narrative:      Left foot calcaneous    Culture, Anaerobe [[id number]] Collected: 11/27/24 1516    Order Status: Completed Specimen: Bone from Foot, Left Updated: 11/29/24 1040     Anaerobic Culture Culture in progress    Narrative:      Left foot calcaneous    Aerobic culture [[id number]] Collected: 11/27/24 1516    Order Status: Completed Specimen: Bone from Foot, Left Updated: 11/28/24 0656     Aerobic Bacterial Culture No growth    Narrative:      Left foot calcaneous    Gram stain [[id number]] Collected: 11/27/24 1516    Order Status: Completed Specimen: Bone from Foot, Left Updated: 11/27/24 2136     Gram Stain Result No WBC's      No organisms seen    Narrative:      Left foot calcaneous    Fungus culture [[id number]] Collected: 11/27/24 1516    Order Status: Sent Specimen: Bone from Foot, Left Updated: 11/27/24 1923    Culture, Anaerobe [[id number]]     Order Status: Canceled Specimen: Bone from Foot, Left     Aerobic culture [[id number]]     Order Status: Canceled Specimen: Bone from Foot, Left     Fungus culture [[id number]]     Order Status: Canceled Specimen: Bone from Foot, Left     AFB Culture & Smear [[id number]]     Order Status: Canceled Specimen: Bone from Foot, Left     Gram stain [[id number]]     Order Status: Canceled Specimen: Bone from Foot, Left           Recent Labs   Lab 11/25/24  1348   COLORU Yellow   SPECGRAV 1.030   PHUR 6.0   PROTEINUA Negative   BACTERIA Rare   NITRITE Negative   LEUKOCYTESUR Negative     All pertinent labs reviewed within the last 24 hours.    Diagnostic Results:  I have reviewed all pertinent imaging results/findings within the past 24 hours.     4

## 2024-11-30 NOTE — PLAN OF CARE
Pt. AAOx4. External fixator C/D/I. No c/o pain. Pt. Up with therapy and up to chair. BS monitored. Bed alarm on and call light in reach. Pt. Instructed to call for assistance. Plan of care continued.   Problem: Adult Inpatient Plan of Care  Goal: Plan of Care Review  Outcome: Progressing     Problem: Adult Inpatient Plan of Care  Goal: Patient-Specific Goal (Individualized)  Outcome: Progressing     Problem: Adult Inpatient Plan of Care  Goal: Absence of Hospital-Acquired Illness or Injury  Outcome: Progressing     Problem: Adult Inpatient Plan of Care  Goal: Optimal Comfort and Wellbeing  Outcome: Progressing     Problem: Adult Inpatient Plan of Care  Goal: Readiness for Transition of Care  Outcome: Progressing

## 2024-11-30 NOTE — PLAN OF CARE
Problem: Adult Inpatient Plan of Care  Goal: Plan of Care Review  Outcome: Progressing     Problem: Surgery Nonspecified  Goal: Absence of Bleeding  Outcome: Met  Goal: Anesthesia/Sedation Recovery  Outcome: Met  Goal: Effective Urinary Elimination  Outcome: Met  Goal: Effective Oxygenation and Ventilation  Outcome: Met   VIRTUAL NURSE:  Labs, notes, orders, and careplan reviewed.

## 2024-11-30 NOTE — PLAN OF CARE
Pt is Aaox4, pt ambulates with asst & scooter to bathroom. IV abx cont. Pt Wound site is C/D/I no visible drainage noted. Pt denied any pain. Pt has a Rough callus on plantar of R/ foot. Pt C/o not being in his normal range of BG control which he states is between 70-90, not 199. Nurse will talk to MD about increase of lantus injection. Wctm     Problem: Diabetes Comorbidity  Goal: Blood Glucose Level Within Targeted Range  Outcome: Progressing     Problem: Wound  Goal: Optimal Coping  Outcome: Progressing     Problem: Skin Injury Risk Increased  Goal: Skin Health and Integrity  Outcome: Progressing

## 2024-11-30 NOTE — ASSESSMENT & PLAN NOTE
POD#3: Pending intra-op bone c&s and pathology from the calcaneus. The cuboid was excised and bioactive glass/antibiotic eluding cement implanted. Ex-fix left lower extremity well aligned and intact. IV antibiotic therapy per ID recommendations. Anticipate 3-4 days prior to final c&s and antibiotic recommendations made. Will need HH upon discharge with possible PICC line. NWB left lower extremity for now but may apply partial weight for transfers. Elevate above heart level at rest.     -Dressing C/D/I, patient resting comfortably with pain appropriately controlled.

## 2024-11-30 NOTE — PROGRESS NOTES
Pharmacokinetic Assessment Follow Up: IV Vancomycin    Vancomycin serum concentration assessment(s):    The trough level was drawn correctly and can be used to guide therapy at this time. The measurement is above the desired definitive target range of 15 to 20 mcg/mL.    Vancomycin Regimen Plan:    Change regimen to Vancomycin 1500 mg IV every 12 hours with next serum trough concentration measured at 1030 prior to 4th dose on 12/1    Drug levels (last 3 results):  Recent Labs   Lab Result Units 11/29/24  2130   Vancomycin-Trough ug/mL 21.5       Pharmacy will continue to follow and monitor vancomycin.    Please contact pharmacy at extension [phone] for questions regarding this assessment.    Thank you for the consult,   Isabella Mcarthur       Patient brief summary:  Indio Ryder Jr. is a 54 y.o. male initiated on antimicrobial therapy with IV Vancomycin for treatment of bone/joint infection    The patient's current regimen is 1750 mg q12h    Drug Allergies:   Review of patient's allergies indicates:  No Known Allergies    Actual Body Weight:   98.1 kg    Renal Function:   Estimated Creatinine Clearance: 127.2 mL/min (based on SCr of 0.8 mg/dL).,     Dialysis Method (if applicable):  N/A    CBC (last 72 hours):  Recent Labs   Lab Result Units 11/28/24  0520   WBC K/uL 7.13   Hemoglobin g/dL 9.8*   Hematocrit % 32.3*   Platelets K/uL 290   Gran % % 70.8   Lymph % % 20.1   Mono % % 8.7   Eosinophil % % 0.0   Basophil % % 0.1   Differential Method  Automated       Metabolic Panel (last 72 hours):  Recent Labs   Lab Result Units 11/28/24  0520 11/29/24  1006 11/29/24  2130   Sodium mmol/L 140 140  --    Potassium mmol/L 3.8 3.7  --    Chloride mmol/L 106 107  --    CO2 mmol/L 23 22*  --    Glucose mg/dL 109 217*  --    BUN mg/dL 18 17  --    Creatinine mg/dL 0.9 1.0 0.8       Vancomycin Administrations:  vancomycin given in the last 96 hours                     vancomycin 1,500 mg in 0.9% NaCl 250 mL IVPB (admixture  device) (mg) 1,500 mg New Bag 11/29/24 2358    vancomycin 1750 mg in 0.9% sodium chloride 500 mL IVPB ()  Restarted 11/29/24 1047     1,750 mg New Bag  0951     1,750 mg New Bag 11/28/24 2135    vancomycin (VANCOCIN) 2,250 mg in 0.9% NaCl 500 mL IVPB (mg) 2,250 mg New Bag 11/28/24 1146                    Microbiologic Results:  Microbiology Results (last 7 days)       Procedure Component Value Units Date/Time    AFB Culture & Smear [[id number]] Collected: 11/27/24 1516    Order Status: Completed Specimen: Bone from Foot, Left Updated: 11/29/24 1706     AFB Culture & Smear Culture in progress     AFB CULTURE STAIN No acid fast bacilli seen.    Narrative:      Left foot calcaneous    Culture, Anaerobe [[id number]] Collected: 11/27/24 1516    Order Status: Completed Specimen: Bone from Foot, Left Updated: 11/29/24 1040     Anaerobic Culture Culture in progress    Narrative:      Left foot calcaneous    Aerobic culture [[id number]] Collected: 11/27/24 1516    Order Status: Completed Specimen: Bone from Foot, Left Updated: 11/28/24 0656     Aerobic Bacterial Culture No growth    Narrative:      Left foot calcaneous    Gram stain [[id number]] Collected: 11/27/24 1516    Order Status: Completed Specimen: Bone from Foot, Left Updated: 11/27/24 2136     Gram Stain Result No WBC's      No organisms seen    Narrative:      Left foot calcaneous    Fungus culture [[id number]] Collected: 11/27/24 1516    Order Status: Sent Specimen: Bone from Foot, Left Updated: 11/27/24 1923    Culture, Anaerobe [[id number]]     Order Status: Canceled Specimen: Bone from Foot, Left     Aerobic culture [[id number]]     Order Status: Canceled Specimen: Bone from Foot, Left     Fungus culture [[id number]]     Order Status: Canceled Specimen: Bone from Foot, Left     AFB Culture & Smear [[id number]]     Order Status: Canceled Specimen: Bone from Foot, Left     Gram stain [[id number]]     Order Status: Canceled Specimen: Bone from Foot, Left

## 2024-11-30 NOTE — PROGRESS NOTES
[facility]  Podiatry  Progress Note    Patient Name: Indio Ryder Jr.  MRN: [id number]  Admission Date: 11/27/2024  Hospital Length of Stay: 3 days  Attending Physician: Daniel Reyes DPM  Primary Care Provider: Lorena Thakur MD     Subjective:     Interval History: Patient resting in bed upon my arrival with left leg appropriately elevated. Dressings C/D/I. Patient states he has had minimal pain. AMADA, VSS, denies N/V/C/F.      Follow-up For: Procedure(s) (LRB):  IRRIGATION AND DEBRIDEMENT (Left)  APPLICATION, EXTERNAL FIXATION SYSTEM, MULTIPLANAR, WITH IMAGING GUIDANCE (Left)    Post-Operative Day: 3 Days Post-Op    Scheduled Meds:   ceFEPime IV (PEDS and ADULTS)  2 g Intravenous Q8H    enoxparin  40 mg Subcutaneous Daily    insulin glargine U-100  15 Units Subcutaneous QHS    metroNIDAZOLE  500 mg Oral Q8H    pantoprazole  40 mg Oral Daily    vancomycin (VANCOCIN) IV (PEDS and ADULTS)  1,500 mg Intravenous Q12H     Continuous Infusions:  PRN Meds:  Current Facility-Administered Medications:     acetaminophen, 650 mg, Oral, Q4H PRN    dextrose 10%, 12.5 g, Intravenous, PRN    dextrose 10%, 25 g, Intravenous, PRN    glucagon (human recombinant), 1 mg, Intramuscular, PRN    glucose, 16 g, Oral, PRN    glucose, 24 g, Oral, PRN    HYDROmorphone, 1 mg, Intravenous, Q3H PRN    influenza, 0.5 mL, Intramuscular, vaccine x 1 dose    insulin aspart U-100, 0-10 Units, Subcutaneous, QID (AC + HS) PRN    ondansetron, 4 mg, Intravenous, Q8H PRN    oxyCODONE-acetaminophen, 1 tablet, Oral, Q4H PRN    oxyCODONE-acetaminophen, 1 tablet, Oral, Q4H PRN    sodium chloride 0.9%, 10 mL, Intravenous, PRN    Pharmacy to dose Vancomycin consult, , , Once **AND** vancomycin - pharmacy to dose, , Intravenous, pharmacy to manage frequency    Review of Systems   Constitutional:  Negative for chills, diaphoresis, fatigue and fever.   Respiratory:  Negative for cough, chest tightness, shortness of breath and wheezing.     Cardiovascular:  Negative for chest pain and leg swelling.   Gastrointestinal:  Negative for abdominal pain, constipation, diarrhea, nausea and vomiting.   Skin:  Positive for wound.     Objective:     Vital Signs (Most Recent):  Temp: 97.6 °F (36.4 °C) (11/30/24 0729)  Pulse: 87 (11/30/24 0729)  Resp: 18 (11/30/24 0729)  BP: (!) 154/85 (11/30/24 0729)  SpO2: 99 % (11/30/24 0810) Vital Signs (24h Range):  Temp:  [97.6 °F (36.4 °C)-98.2 °F (36.8 °C)] 97.6 °F (36.4 °C)  Pulse:  [] 87  Resp:  [18-20] 18  SpO2:  [95 %-100 %] 99 %  BP: (127-154)/(75-85) 154/85     Weight: 98.1 kg (216 lb 4.3 oz)  Body mass index is 28.53 kg/m².    Foot Exam    General  General Appearance: appears stated age and healthy   Orientation: alert and oriented to person, place, and time   Affect: appropriate       Left Foot/Ankle      Comments  External fixation intact to left lower extremity, dressing C/D/I with no strikethrough noted.       Laboratory:  A1C:   Recent Labs   Lab 08/08/24  1335 10/16/24  1221   HGBA1C >14.0* 11.0*     BMP:   Recent Labs   Lab 11/30/24  0527   GLU 98      K 3.2*      CO2 25   BUN 13   CREATININE 0.8   CALCIUM 8.9     CBC:   Recent Labs   Lab 11/30/24  0527   WBC 7.28   RBC 3.09*   HGB 8.5*   HCT 27.5*      MCV 89   MCH 27.5   MCHC 30.9*     CMP:   Recent Labs   Lab 11/25/24  1423 11/28/24  0520 11/30/24  0527   GLU 91   < > 98   CALCIUM 8.8   < > 8.9   ALBUMIN 2.7*  --   --    PROT 7.4  --   --       < > 141   K 3.8   < > 3.2*   CO2 25   < > 25      < > 109   BUN 11   < > 13   CREATININE 0.9   < > 0.8   ALKPHOS 189*  --   --    ALT 12  --   --    AST 16  --   --    BILITOT 0.4  --   --     < > = values in this interval not displayed.     LFTs:   Recent Labs   Lab 11/25/24  1423   ALT 12   AST 16   ALKPHOS 189*   BILITOT 0.4   PROT 7.4   ALBUMIN 2.7*     Wound Cultures:   Recent Labs   Lab 06/25/24  1630 07/05/24  0910 10/11/24  0925 11/27/24  1516   LABAERO No growth No  significant isolate PSEUDOMONAS AERUGINOSA  Moderate  * No growth     Microbiology Results (last 7 days)       Procedure Component Value Units Date/Time    AFB Culture & Smear [[id number]] Collected: 11/27/24 1516    Order Status: Completed Specimen: Bone from Foot, Left Updated: 11/29/24 1706     AFB Culture & Smear Culture in progress     AFB CULTURE STAIN No acid fast bacilli seen.    Narrative:      Left foot calcaneous    Culture, Anaerobe [[id number]] Collected: 11/27/24 1516    Order Status: Completed Specimen: Bone from Foot, Left Updated: 11/29/24 1040     Anaerobic Culture Culture in progress    Narrative:      Left foot calcaneous    Aerobic culture [[id number]] Collected: 11/27/24 1516    Order Status: Completed Specimen: Bone from Foot, Left Updated: 11/28/24 0656     Aerobic Bacterial Culture No growth    Narrative:      Left foot calcaneous    Gram stain [[id number]] Collected: 11/27/24 1516    Order Status: Completed Specimen: Bone from Foot, Left Updated: 11/27/24 2136     Gram Stain Result No WBC's      No organisms seen    Narrative:      Left foot calcaneous    Fungus culture [[id number]] Collected: 11/27/24 1516    Order Status: Sent Specimen: Bone from Foot, Left Updated: 11/27/24 1923    Culture, Anaerobe [[id number]]     Order Status: Canceled Specimen: Bone from Foot, Left     Aerobic culture [[id number]]     Order Status: Canceled Specimen: Bone from Foot, Left     Fungus culture [[id number]]     Order Status: Canceled Specimen: Bone from Foot, Left     AFB Culture & Smear [[id number]]     Order Status: Canceled Specimen: Bone from Foot, Left     Gram stain [[id number]]     Order Status: Canceled Specimen: Bone from Foot, Left           Recent Labs   Lab 11/25/24  1348   COLORU Yellow   SPECGRAV 1.030   PHUR 6.0   PROTEINUA Negative   BACTERIA Rare   NITRITE Negative   LEUKOCYTESUR Negative     All pertinent labs reviewed within the last 24 hours.    Diagnostic Results:  I have reviewed all  pertinent imaging results/findings within the past 24 hours.    Assessment/Plan:     ID  * Chronic osteomyelitis of left foot  POD#3: Pending intra-op bone c&s and pathology from the calcaneus. The cuboid was excised and bioactive glass/antibiotic eluding cement implanted. Ex-fix left lower extremity well aligned and intact. IV antibiotic therapy per ID recommendations. Anticipate 3-4 days prior to final c&s and antibiotic recommendations made. Will need HH upon discharge with possible PICC line. NWB left lower extremity for now but may apply partial weight for transfers. Elevate above heart level at rest.     -Dressing C/D/I, patient resting comfortably with pain appropriately controlled.        Vamshi Matias MD  Podiatry  [facility]

## 2024-11-30 NOTE — PLAN OF CARE
Virtual Nurse note: Patient chart, labs, and vitals reviewed. Care plan and goals updated as needed.   VN to continue to be available as needed.     Is This A New Presentation, Or A Follow-Up?: Skin Lesions

## 2024-11-30 NOTE — PLAN OF CARE
Problem: Adult Inpatient Plan of Care  Goal: Plan of Care Review  Outcome: Progressing     Problem: Diabetes Comorbidity  Goal: Blood Glucose Level Within Targeted Range  Outcome: Progressing     Problem: Wound  Goal: Optimal Coping  Outcome: Progressing     Problem: Infection  Goal: Absence of Infection Signs and Symptoms  Outcome: Progressing     Problem: Comorbidity Management  Goal: Blood Pressure in Desired Range  Outcome: Progressing     Problem: Fall Injury Risk  Goal: Absence of Fall and Fall-Related Injury  Outcome: Progressing     Problem: Skin Injury Risk Increased  Goal: Skin Health and Integrity  Outcome: Progressing     Problem: Surgery Nonspecified  Goal: Effective Bowel Elimination  Outcome: Progressing   Pt ambulates to bathroom using crutches, 1 person assist. Prn insulin given. No c/o pain, L foot dressing CDI. Urinal at bedside, safety maintained.

## 2024-12-01 LAB
ANION GAP SERPL CALC-SCNC: 8 MMOL/L (ref 8–16)
BASOPHILS # BLD AUTO: 0.03 K/UL (ref 0–0.2)
BASOPHILS NFR BLD: 0.5 % (ref 0–1.9)
BUN SERPL-MCNC: 12 MG/DL (ref 6–20)
CALCIUM SERPL-MCNC: 8.9 MG/DL (ref 8.7–10.5)
CHLORIDE SERPL-SCNC: 108 MMOL/L (ref 95–110)
CO2 SERPL-SCNC: 25 MMOL/L (ref 23–29)
CREAT SERPL-MCNC: 0.8 MG/DL (ref 0.5–1.4)
DIFFERENTIAL METHOD BLD: ABNORMAL
EOSINOPHIL # BLD AUTO: 0.1 K/UL (ref 0–0.5)
EOSINOPHIL NFR BLD: 1.7 % (ref 0–8)
ERYTHROCYTE [DISTWIDTH] IN BLOOD BY AUTOMATED COUNT: 14.4 % (ref 11.5–14.5)
EST. GFR  (NO RACE VARIABLE): >60 ML/MIN/1.73 M^2
GLUCOSE SERPL-MCNC: 100 MG/DL (ref 70–110)
HCT VFR BLD AUTO: 27.7 % (ref 40–54)
HGB BLD-MCNC: 8.6 G/DL (ref 14–18)
IMM GRANULOCYTES # BLD AUTO: 0.02 K/UL (ref 0–0.04)
IMM GRANULOCYTES NFR BLD AUTO: 0.3 % (ref 0–0.5)
LYMPHOCYTES # BLD AUTO: 1.8 K/UL (ref 1–4.8)
LYMPHOCYTES NFR BLD: 27.8 % (ref 18–48)
MCH RBC QN AUTO: 27.5 PG (ref 27–31)
MCHC RBC AUTO-ENTMCNC: 31 G/DL (ref 32–36)
MCV RBC AUTO: 89 FL (ref 82–98)
MONOCYTES # BLD AUTO: 0.7 K/UL (ref 0.3–1)
MONOCYTES NFR BLD: 10.4 % (ref 4–15)
NEUTROPHILS # BLD AUTO: 3.8 K/UL (ref 1.8–7.7)
NEUTROPHILS NFR BLD: 59.3 % (ref 38–73)
NRBC BLD-RTO: 0 /100 WBC
PLATELET # BLD AUTO: 297 K/UL (ref 150–450)
PMV BLD AUTO: 10 FL (ref 9.2–12.9)
POCT GLUCOSE: 149 MG/DL (ref 70–110)
POCT GLUCOSE: 190 MG/DL (ref 70–110)
POCT GLUCOSE: 195 MG/DL (ref 70–110)
POCT GLUCOSE: 92 MG/DL (ref 70–110)
POTASSIUM SERPL-SCNC: 3.5 MMOL/L (ref 3.5–5.1)
RBC # BLD AUTO: 3.13 M/UL (ref 4.6–6.2)
SODIUM SERPL-SCNC: 141 MMOL/L (ref 136–145)
VANCOMYCIN TROUGH SERPL-MCNC: 21.5 UG/ML (ref 10–22)
WBC # BLD AUTO: 6.36 K/UL (ref 3.9–12.7)

## 2024-12-01 PROCEDURE — 36415 COLL VENOUS BLD VENIPUNCTURE: CPT | Mod: HCNC | Performed by: PODIATRIST

## 2024-12-01 PROCEDURE — 85025 COMPLETE CBC W/AUTO DIFF WBC: CPT | Mod: HCNC | Performed by: INTERNAL MEDICINE

## 2024-12-01 PROCEDURE — 25000003 PHARM REV CODE 250: Mod: HCNC | Performed by: PODIATRIST

## 2024-12-01 PROCEDURE — 80048 BASIC METABOLIC PNL TOTAL CA: CPT | Mod: HCNC | Performed by: INTERNAL MEDICINE

## 2024-12-01 PROCEDURE — 36415 COLL VENOUS BLD VENIPUNCTURE: CPT | Mod: HCNC | Performed by: INTERNAL MEDICINE

## 2024-12-01 PROCEDURE — 25000003 PHARM REV CODE 250: Mod: HCNC | Performed by: INTERNAL MEDICINE

## 2024-12-01 PROCEDURE — 63600175 PHARM REV CODE 636 W HCPCS: Mod: HCNC | Performed by: PODIATRIST

## 2024-12-01 PROCEDURE — 63600175 PHARM REV CODE 636 W HCPCS: Mod: HCNC | Performed by: INTERNAL MEDICINE

## 2024-12-01 PROCEDURE — 11000001 HC ACUTE MED/SURG PRIVATE ROOM: Mod: HCNC

## 2024-12-01 PROCEDURE — 99024 POSTOP FOLLOW-UP VISIT: CPT | Mod: ,,, | Performed by: STUDENT IN AN ORGANIZED HEALTH CARE EDUCATION/TRAINING PROGRAM

## 2024-12-01 PROCEDURE — 94761 N-INVAS EAR/PLS OXIMETRY MLT: CPT | Mod: HCNC

## 2024-12-01 PROCEDURE — 80202 ASSAY OF VANCOMYCIN: CPT | Mod: HCNC | Performed by: PODIATRIST

## 2024-12-01 RX ADMIN — METRONIDAZOLE 500 MG: 500 TABLET ORAL at 06:12

## 2024-12-01 RX ADMIN — SENNOSIDES AND DOCUSATE SODIUM 1 TABLET: 8.6; 5 TABLET ORAL at 08:12

## 2024-12-01 RX ADMIN — CEFEPIME 2 G: 1 INJECTION, POWDER, FOR SOLUTION INTRAMUSCULAR; INTRAVENOUS at 11:12

## 2024-12-01 RX ADMIN — INSULIN GLARGINE 15 UNITS: 100 INJECTION, SOLUTION SUBCUTANEOUS at 09:12

## 2024-12-01 RX ADMIN — VANCOMYCIN HYDROCHLORIDE 1250 MG: 1.25 INJECTION, POWDER, LYOPHILIZED, FOR SOLUTION INTRAVENOUS at 11:12

## 2024-12-01 RX ADMIN — METRONIDAZOLE 500 MG: 500 TABLET ORAL at 01:12

## 2024-12-01 RX ADMIN — CEFEPIME 2 G: 1 INJECTION, POWDER, FOR SOLUTION INTRAMUSCULAR; INTRAVENOUS at 03:12

## 2024-12-01 RX ADMIN — PANTOPRAZOLE SODIUM 40 MG: 40 TABLET, DELAYED RELEASE ORAL at 08:12

## 2024-12-01 RX ADMIN — METRONIDAZOLE 500 MG: 500 TABLET ORAL at 09:12

## 2024-12-01 RX ADMIN — CEFEPIME 2 G: 1 INJECTION, POWDER, FOR SOLUTION INTRAMUSCULAR; INTRAVENOUS at 06:12

## 2024-12-01 RX ADMIN — INSULIN ASPART 2 UNITS: 100 INJECTION, SOLUTION INTRAVENOUS; SUBCUTANEOUS at 04:12

## 2024-12-01 RX ADMIN — ENOXAPARIN SODIUM 40 MG: 40 INJECTION SUBCUTANEOUS at 04:12

## 2024-12-01 NOTE — SUBJECTIVE & OBJECTIVE
Subjective:     Interval History: Patient resting in bed upon my arrival. Instructed the patient about proper elevation of the leg and helped re-elevate the limb. Dressings C/D/I. Patient complains of no pain overnight and denies N/V/F/C. JAGRUTI YBARRA.     Follow-up For: Procedure(s) (LRB):  IRRIGATION AND DEBRIDEMENT (Left)  APPLICATION, EXTERNAL FIXATION SYSTEM, MULTIPLANAR, WITH IMAGING GUIDANCE (Left)    Post-Operative Day: 4 Days Post-Op    Scheduled Meds:   ceFEPime IV (PEDS and ADULTS)  2 g Intravenous Q8H    enoxparin  40 mg Subcutaneous Daily    insulin glargine U-100  15 Units Subcutaneous QHS    metroNIDAZOLE  500 mg Oral Q8H    pantoprazole  40 mg Oral Daily    senna-docusate 8.6-50 mg  1 tablet Oral Daily    vancomycin (VANCOCIN) IV (PEDS and ADULTS)  1,500 mg Intravenous Q12H     Continuous Infusions:  PRN Meds:  Current Facility-Administered Medications:     acetaminophen, 650 mg, Oral, Q4H PRN    dextrose 10%, 12.5 g, Intravenous, PRN    dextrose 10%, 25 g, Intravenous, PRN    glucagon (human recombinant), 1 mg, Intramuscular, PRN    glucose, 16 g, Oral, PRN    glucose, 24 g, Oral, PRN    HYDROmorphone, 1 mg, Intravenous, Q3H PRN    influenza, 0.5 mL, Intramuscular, vaccine x 1 dose    insulin aspart U-100, 0-10 Units, Subcutaneous, QID (AC + HS) PRN    ondansetron, 4 mg, Intravenous, Q8H PRN    oxyCODONE-acetaminophen, 1 tablet, Oral, Q4H PRN    oxyCODONE-acetaminophen, 1 tablet, Oral, Q4H PRN    sodium chloride 0.9%, 10 mL, Intravenous, PRN    Pharmacy to dose Vancomycin consult, , , Once **AND** vancomycin - pharmacy to dose, , Intravenous, pharmacy to manage frequency    Review of Systems   Constitutional:  Negative for chills, diaphoresis, fatigue and fever.   Respiratory:  Negative for cough, chest tightness, shortness of breath and wheezing.    Cardiovascular:  Negative for chest pain and leg swelling.   Gastrointestinal:  Negative for abdominal pain, constipation, diarrhea, nausea and  vomiting.   Skin:  Positive for wound.     Objective:     Vital Signs (Most Recent):  Temp: 97.9 °F (36.6 °C) (12/01/24 0723)  Pulse: 87 (12/01/24 0723)  Resp: 18 (12/01/24 0723)  BP: 137/80 (12/01/24 0723)  SpO2: 97 % (12/01/24 0900) Vital Signs (24h Range):  Temp:  [97.7 °F (36.5 °C)-98.7 °F (37.1 °C)] 97.9 °F (36.6 °C)  Pulse:  [] 87  Resp:  [18-20] 18  SpO2:  [94 %-100 %] 97 %  BP: (124-164)/(70-85) 137/80     Weight: 98.1 kg (216 lb 4.3 oz)  Body mass index is 28.53 kg/m².    Foot Exam    General  General Appearance: appears stated age and healthy   Orientation: alert and oriented to person, place, and time   Affect: appropriate       Left Foot/Ankle      Comments  External fixation intact to left lower extremity, dressing C/D/I with no strikethrough noted.       Laboratory:  A1C:   Recent Labs   Lab 08/08/24  1335 10/16/24  1221   HGBA1C >14.0* 11.0*     BMP:   Recent Labs   Lab 12/01/24  0517         K 3.5      CO2 25   BUN 12   CREATININE 0.8   CALCIUM 8.9     CBC:   Recent Labs   Lab 12/01/24  0517   WBC 6.36   RBC 3.13*   HGB 8.6*   HCT 27.7*      MCV 89   MCH 27.5   MCHC 31.0*     CMP:   Recent Labs   Lab 11/25/24  1423 11/28/24  0520 12/01/24  0517   GLU 91   < > 100   CALCIUM 8.8   < > 8.9   ALBUMIN 2.7*  --   --    PROT 7.4  --   --       < > 141   K 3.8   < > 3.5   CO2 25   < > 25      < > 108   BUN 11   < > 12   CREATININE 0.9   < > 0.8   ALKPHOS 189*  --   --    ALT 12  --   --    AST 16  --   --    BILITOT 0.4  --   --     < > = values in this interval not displayed.     LFTs:   Recent Labs   Lab 11/25/24  1423   ALT 12   AST 16   ALKPHOS 189*   BILITOT 0.4   PROT 7.4   ALBUMIN 2.7*     Wound Cultures:   Recent Labs   Lab 06/25/24  1630 07/05/24  0910 10/11/24  0925 11/27/24  1516   LABAERO No growth No significant isolate PSEUDOMONAS AERUGINOSA  Moderate  * No growth     Microbiology Results (last 7 days)       Procedure Component Value Units Date/Time     AFB Culture & Smear [[id number]] Collected: 11/27/24 1516    Order Status: Completed Specimen: Bone from Foot, Left Updated: 11/29/24 1706     AFB Culture & Smear Culture in progress     AFB CULTURE STAIN No acid fast bacilli seen.    Narrative:      Left foot calcaneous    Culture, Anaerobe [[id number]] Collected: 11/27/24 1516    Order Status: Completed Specimen: Bone from Foot, Left Updated: 11/29/24 1040     Anaerobic Culture Culture in progress    Narrative:      Left foot calcaneous    Aerobic culture [[id number]] Collected: 11/27/24 1516    Order Status: Completed Specimen: Bone from Foot, Left Updated: 11/28/24 0656     Aerobic Bacterial Culture No growth    Narrative:      Left foot calcaneous    Gram stain [[id number]] Collected: 11/27/24 1516    Order Status: Completed Specimen: Bone from Foot, Left Updated: 11/27/24 2136     Gram Stain Result No WBC's      No organisms seen    Narrative:      Left foot calcaneous    Fungus culture [[id number]] Collected: 11/27/24 1516    Order Status: Sent Specimen: Bone from Foot, Left Updated: 11/27/24 1923    Culture, Anaerobe [[id number]]     Order Status: Canceled Specimen: Bone from Foot, Left     Aerobic culture [[id number]]     Order Status: Canceled Specimen: Bone from Foot, Left     Fungus culture [[id number]]     Order Status: Canceled Specimen: Bone from Foot, Left     AFB Culture & Smear [[id number]]     Order Status: Canceled Specimen: Bone from Foot, Left     Gram stain [[id number]]     Order Status: Canceled Specimen: Bone from Foot, Left           Recent Labs   Lab 11/25/24  1348   COLORU Yellow   SPECGRAV 1.030   PHUR 6.0   PROTEINUA Negative   BACTERIA Rare   NITRITE Negative   LEUKOCYTESUR Negative     All pertinent labs reviewed within the last 24 hours.    Diagnostic Results:  I have reviewed all pertinent imaging results/findings within the past 24 hours.

## 2024-12-01 NOTE — PLAN OF CARE
Pt vitals were maintained. Pt ambulated with staff to bathroom today. Pt tolerated IV ABX, pt denied any pain. Pt BG was in range. Pt is AAOX4. No visible drainage coming from external fixator. WCTM    Problem: Wound  Goal: Optimal Coping  Outcome: Progressing  Goal: Optimal Pain Control and Function  Outcome: Progressing  Goal: Optimal Wound Healing  Outcome: Progressing     Problem: Infection  Goal: Absence of Infection Signs and Symptoms  Outcome: Progressing

## 2024-12-01 NOTE — PLAN OF CARE
Problem: Adult Inpatient Plan of Care  Goal: Plan of Care Review  Outcome: Progressing     Problem: Diabetes Comorbidity  Goal: Blood Glucose Level Within Targeted Range  Outcome: Progressing     Problem: Wound  Goal: Optimal Coping  Outcome: Progressing     Problem: Infection  Goal: Absence of Infection Signs and Symptoms  Outcome: Progressing     Problem: Comorbidity Management  Goal: Blood Pressure in Desired Range  Outcome: Progressing     Problem: Fall Injury Risk  Goal: Absence of Fall and Fall-Related Injury  Outcome: Progressing     Problem: Skin Injury Risk Increased  Goal: Skin Health and Integrity  Outcome: Progressing     Problem: Surgery Nonspecified  Goal: Effective Bowel Elimination  Outcome: Progressing   Left foot dressing CDI. Prn insulin given. Pt sitting up in chair, no c/o pain. Urinal at bedside, safety maintained.

## 2024-12-01 NOTE — PROGRESS NOTES
[facility] ID note    Patient afebrile.  WBC 6.4.  On vancomycin, cefepime, metronidazole.\    Surgical cultures negative so far.  Anaerobic cultures pending    We will discontinue vancomycin for now  Continue cefepime and metronidazole for the moment  Definitive therapy based upon culture data Wound healing and pathology    Monitor at a distance    Please call if any questions   Av Gregorio  [facility] ID  [phone]

## 2024-12-01 NOTE — PROGRESS NOTES
[facility] Surg  Podiatry  Progress Note    Patient Name: Indio Ryder Jr.  MRN: [id number]  Admission Date: 11/27/2024  Hospital Length of Stay: 4 days  Attending Physician: Daniel Reyes DPM  Primary Care Provider: Lorena Thakur MD     Subjective:     Interval History: Patient resting in bed upon my arrival. Instructed the patient about proper elevation of the leg and helped re-elevate the limb. Dressings C/D/I. Patient complains of no pain overnight and denies N/V/F/C. JAGRUTI YBARRA.     Follow-up For: Procedure(s) (LRB):  IRRIGATION AND DEBRIDEMENT (Left)  APPLICATION, EXTERNAL FIXATION SYSTEM, MULTIPLANAR, WITH IMAGING GUIDANCE (Left)    Post-Operative Day: 4 Days Post-Op    Scheduled Meds:   ceFEPime IV (PEDS and ADULTS)  2 g Intravenous Q8H    enoxparin  40 mg Subcutaneous Daily    insulin glargine U-100  15 Units Subcutaneous QHS    metroNIDAZOLE  500 mg Oral Q8H    pantoprazole  40 mg Oral Daily    senna-docusate 8.6-50 mg  1 tablet Oral Daily    vancomycin (VANCOCIN) IV (PEDS and ADULTS)  1,500 mg Intravenous Q12H     Continuous Infusions:  PRN Meds:  Current Facility-Administered Medications:     acetaminophen, 650 mg, Oral, Q4H PRN    dextrose 10%, 12.5 g, Intravenous, PRN    dextrose 10%, 25 g, Intravenous, PRN    glucagon (human recombinant), 1 mg, Intramuscular, PRN    glucose, 16 g, Oral, PRN    glucose, 24 g, Oral, PRN    HYDROmorphone, 1 mg, Intravenous, Q3H PRN    influenza, 0.5 mL, Intramuscular, vaccine x 1 dose    insulin aspart U-100, 0-10 Units, Subcutaneous, QID (AC + HS) PRN    ondansetron, 4 mg, Intravenous, Q8H PRN    oxyCODONE-acetaminophen, 1 tablet, Oral, Q4H PRN    oxyCODONE-acetaminophen, 1 tablet, Oral, Q4H PRN    sodium chloride 0.9%, 10 mL, Intravenous, PRN    Pharmacy to dose Vancomycin consult, , , Once **AND** vancomycin - pharmacy to dose, , Intravenous, pharmacy to manage frequency    Review of Systems   Constitutional:  Negative for chills, diaphoresis, fatigue  and fever.   Respiratory:  Negative for cough, chest tightness, shortness of breath and wheezing.    Cardiovascular:  Negative for chest pain and leg swelling.   Gastrointestinal:  Negative for abdominal pain, constipation, diarrhea, nausea and vomiting.   Skin:  Positive for wound.     Objective:     Vital Signs (Most Recent):  Temp: 97.9 °F (36.6 °C) (12/01/24 0723)  Pulse: 87 (12/01/24 0723)  Resp: 18 (12/01/24 0723)  BP: 137/80 (12/01/24 0723)  SpO2: 97 % (12/01/24 0900) Vital Signs (24h Range):  Temp:  [97.7 °F (36.5 °C)-98.7 °F (37.1 °C)] 97.9 °F (36.6 °C)  Pulse:  [] 87  Resp:  [18-20] 18  SpO2:  [94 %-100 %] 97 %  BP: (124-164)/(70-85) 137/80     Weight: 98.1 kg (216 lb 4.3 oz)  Body mass index is 28.53 kg/m².    Foot Exam    General  General Appearance: appears stated age and healthy   Orientation: alert and oriented to person, place, and time   Affect: appropriate       Left Foot/Ankle      Comments  External fixation intact to left lower extremity, dressing C/D/I with no strikethrough noted.       Laboratory:  A1C:   Recent Labs   Lab 08/08/24  1335 10/16/24  1221   HGBA1C >14.0* 11.0*     BMP:   Recent Labs   Lab 12/01/24  0517         K 3.5      CO2 25   BUN 12   CREATININE 0.8   CALCIUM 8.9     CBC:   Recent Labs   Lab 12/01/24  0517   WBC 6.36   RBC 3.13*   HGB 8.6*   HCT 27.7*      MCV 89   MCH 27.5   MCHC 31.0*     CMP:   Recent Labs   Lab 11/25/24  1423 11/28/24  0520 12/01/24  0517   GLU 91   < > 100   CALCIUM 8.8   < > 8.9   ALBUMIN 2.7*  --   --    PROT 7.4  --   --       < > 141   K 3.8   < > 3.5   CO2 25   < > 25      < > 108   BUN 11   < > 12   CREATININE 0.9   < > 0.8   ALKPHOS 189*  --   --    ALT 12  --   --    AST 16  --   --    BILITOT 0.4  --   --     < > = values in this interval not displayed.     LFTs:   Recent Labs   Lab 11/25/24  1423   ALT 12   AST 16   ALKPHOS 189*   BILITOT 0.4   PROT 7.4   ALBUMIN 2.7*     Wound Cultures:   Recent  Labs   Lab 06/25/24  1630 07/05/24  0910 10/11/24  0925 11/27/24  1516   LABAERO No growth No significant isolate PSEUDOMONAS AERUGINOSA  Moderate  * No growth     Microbiology Results (last 7 days)       Procedure Component Value Units Date/Time    AFB Culture & Smear [[id number]] Collected: 11/27/24 1516    Order Status: Completed Specimen: Bone from Foot, Left Updated: 11/29/24 1706     AFB Culture & Smear Culture in progress     AFB CULTURE STAIN No acid fast bacilli seen.    Narrative:      Left foot calcaneous    Culture, Anaerobe [[id number]] Collected: 11/27/24 1516    Order Status: Completed Specimen: Bone from Foot, Left Updated: 11/29/24 1040     Anaerobic Culture Culture in progress    Narrative:      Left foot calcaneous    Aerobic culture [[id number]] Collected: 11/27/24 1516    Order Status: Completed Specimen: Bone from Foot, Left Updated: 11/28/24 0656     Aerobic Bacterial Culture No growth    Narrative:      Left foot calcaneous    Gram stain [[id number]] Collected: 11/27/24 1516    Order Status: Completed Specimen: Bone from Foot, Left Updated: 11/27/24 2136     Gram Stain Result No WBC's      No organisms seen    Narrative:      Left foot calcaneous    Fungus culture [[id number]] Collected: 11/27/24 1516    Order Status: Sent Specimen: Bone from Foot, Left Updated: 11/27/24 1923    Culture, Anaerobe [[id number]]     Order Status: Canceled Specimen: Bone from Foot, Left     Aerobic culture [[id number]]     Order Status: Canceled Specimen: Bone from Foot, Left     Fungus culture [[id number]]     Order Status: Canceled Specimen: Bone from Foot, Left     AFB Culture & Smear [[id number]]     Order Status: Canceled Specimen: Bone from Foot, Left     Gram stain [[id number]]     Order Status: Canceled Specimen: Bone from Foot, Left           Recent Labs   Lab 11/25/24  1348   COLORU Yellow   SPECGRAV 1.030   PHUR 6.0   PROTEINUA Negative   BACTERIA Rare   NITRITE Negative   LEUKOCYTESUR Negative     All  pertinent labs reviewed within the last 24 hours.    Diagnostic Results:  I have reviewed all pertinent imaging results/findings within the past 24 hours.    Assessment/Plan:     ID  * Chronic osteomyelitis of left foot  POD#3: Pending intra-op bone c&s and pathology from the calcaneus. The cuboid was excised and bioactive glass/antibiotic eluding cement implanted. Ex-fix left lower extremity well aligned and intact. IV antibiotic therapy per ID recommendations. Anticipate 3-4 days prior to final c&s and antibiotic recommendations made. Will need HH upon discharge with possible PICC line. NWB left lower extremity for now but may apply partial weight for transfers. Elevate above heart level at rest.     -Dressing C/D/I with no strikethrough noted, patient resting comfortably with pain appropriately controlled. Patients limb re-elevated to appropriate position.         Vamshi Matias MD  Podiatry  [facility] Surg

## 2024-12-01 NOTE — ASSESSMENT & PLAN NOTE
POD#3: Pending intra-op bone c&s and pathology from the calcaneus. The cuboid was excised and bioactive glass/antibiotic eluding cement implanted. Ex-fix left lower extremity well aligned and intact. IV antibiotic therapy per ID recommendations. Anticipate 3-4 days prior to final c&s and antibiotic recommendations made. Will need HH upon discharge with possible PICC line. NWB left lower extremity for now but may apply partial weight for transfers. Elevate above heart level at rest.     -Dressing C/D/I with no strikethrough noted, patient resting comfortably with pain appropriately controlled. Patients limb re-elevated to appropriate position.

## 2024-12-01 NOTE — PROGRESS NOTES
Pharmacokinetic Assessment Follow Up: IV Vancomycin    Vancomycin serum concentration assessment(s):    The trough level was drawn correctly and can be used to guide therapy at this time. The measurement is above the desired definitive target range of 15 to 20 mcg/mL.    Vancomycin Regimen Plan:    Change regimen to Vancomycin 1250 mg IV every 12 hours with next serum trough concentration measured at 1130 prior to third dose on 12/02/2024    Drug levels (last 3 results):  Recent Labs   Lab Result Units 11/29/24 2130 12/01/24  1026   Vancomycin-Trough ug/mL 21.5 21.5       Pharmacy will continue to follow and monitor vancomycin.    Please contact pharmacy at extension [phone] for questions regarding this assessment.    Thank you for the consult,   Rosetta Story       Patient brief summary:  Indio Ryder Jr. is a 54 y.o. male initiated on antimicrobial therapy with IV Vancomycin for treatment of bone/joint infection    The patient's current regimen is vancomycin 1250 mg ivpb q12h    Drug Allergies:   Review of patient's allergies indicates:  No Known Allergies    Actual Body Weight:   98.1 kg    Renal Function:   Estimated Creatinine Clearance: 127.2 mL/min (based on SCr of 0.8 mg/dL).,     Dialysis Method (if applicable):  N/A    CBC (last 72 hours):  Recent Labs   Lab Result Units 11/30/24  0527 12/01/24  0517   WBC K/uL 7.28 6.36   Hemoglobin g/dL 8.5* 8.6*   Hematocrit % 27.5* 27.7*   Platelets K/uL 245 297   Gran % % 63.8 59.3   Lymph % % 23.5 27.8   Mono % % 10.4 10.4   Eosinophil % % 1.5 1.7   Basophil % % 0.4 0.5   Differential Method  Automated Automated       Metabolic Panel (last 72 hours):  Recent Labs   Lab Result Units 11/29/24  1006 11/29/24  2130 11/30/24  0527 12/01/24  0517   Sodium mmol/L 140  --  141 141   Potassium mmol/L 3.7  --  3.2* 3.5   Chloride mmol/L 107  --  109 108   CO2 mmol/L 22*  --  25 25   Glucose mg/dL 217*  --  98 100   BUN mg/dL 17  --  13 12   Creatinine mg/dL 1.0  0.8 0.8 0.8       Vancomycin Administrations:  vancomycin given in the last 96 hours                     vancomycin 1,500 mg in 0.9% NaCl 250 mL IVPB (admixture device) (mg) 1,500 mg New Bag 11/30/24 2331      Restarted  1203     1,500 mg New Bag  1157     1,500 mg New Bag 11/29/24 2358    vancomycin 1750 mg in 0.9% sodium chloride 500 mL IVPB ()  Restarted 11/29/24 1047     1,750 mg New Bag  0951     1,750 mg New Bag 11/28/24 2135    vancomycin (VANCOCIN) 2,250 mg in 0.9% NaCl 500 mL IVPB (mg) 2,250 mg New Bag 11/28/24 1146                    Microbiologic Results:  Microbiology Results (last 7 days)       Procedure Component Value Units Date/Time    AFB Culture & Smear [[id number]] Collected: 11/27/24 1516    Order Status: Completed Specimen: Bone from Foot, Left Updated: 11/29/24 1706     AFB Culture & Smear Culture in progress     AFB CULTURE STAIN No acid fast bacilli seen.    Narrative:      Left foot calcaneous    Culture, Anaerobe [[id number]] Collected: 11/27/24 1516    Order Status: Completed Specimen: Bone from Foot, Left Updated: 11/29/24 1040     Anaerobic Culture Culture in progress    Narrative:      Left foot calcaneous    Aerobic culture [[id number]] Collected: 11/27/24 1516    Order Status: Completed Specimen: Bone from Foot, Left Updated: 11/28/24 0656     Aerobic Bacterial Culture No growth    Narrative:      Left foot calcaneous    Gram stain [[id number]] Collected: 11/27/24 1516    Order Status: Completed Specimen: Bone from Foot, Left Updated: 11/27/24 2136     Gram Stain Result No WBC's      No organisms seen    Narrative:      Left foot calcaneous    Fungus culture [[id number]] Collected: 11/27/24 1516    Order Status: Sent Specimen: Bone from Foot, Left Updated: 11/27/24 1923    Culture, Anaerobe [[id number]]     Order Status: Canceled Specimen: Bone from Foot, Left     Aerobic culture [[id number]]     Order Status: Canceled Specimen: Bone from Foot, Left     Fungus culture [[id number]]      Order Status: Canceled Specimen: Bone from Foot, Left     AFB Culture & Smear [[id number]]     Order Status: Canceled Specimen: Bone from Foot, Left     Gram stain [[id number]]     Order Status: Canceled Specimen: Bone from Foot, Left

## 2024-12-01 NOTE — PROGRESS NOTES
Therapy with vancomycin complete and/or consult discontinued by provider.  Pharmacy will sign off, please re-consult as needed.    Thanks,    Jesse Hatch, Pharm.D

## 2024-12-02 LAB
ANION GAP SERPL CALC-SCNC: 8 MMOL/L (ref 8–16)
BACTERIA SPEC AEROBE CULT: NO GROWTH
BASOPHILS # BLD AUTO: 0.03 K/UL (ref 0–0.2)
BASOPHILS NFR BLD: 0.5 % (ref 0–1.9)
BUN SERPL-MCNC: 14 MG/DL (ref 6–20)
CALCIUM SERPL-MCNC: 9.1 MG/DL (ref 8.7–10.5)
CHLORIDE SERPL-SCNC: 107 MMOL/L (ref 95–110)
CO2 SERPL-SCNC: 25 MMOL/L (ref 23–29)
CREAT SERPL-MCNC: 0.8 MG/DL (ref 0.5–1.4)
DIFFERENTIAL METHOD BLD: ABNORMAL
EOSINOPHIL # BLD AUTO: 0.1 K/UL (ref 0–0.5)
EOSINOPHIL NFR BLD: 2.4 % (ref 0–8)
ERYTHROCYTE [DISTWIDTH] IN BLOOD BY AUTOMATED COUNT: 14.4 % (ref 11.5–14.5)
EST. GFR  (NO RACE VARIABLE): >60 ML/MIN/1.73 M^2
FINAL PATHOLOGIC DIAGNOSIS: NORMAL
FUNGUS SPEC CULT: NORMAL
GLUCOSE SERPL-MCNC: 119 MG/DL (ref 70–110)
GROSS: NORMAL
HCT VFR BLD AUTO: 29.7 % (ref 40–54)
HGB BLD-MCNC: 9.2 G/DL (ref 14–18)
IMM GRANULOCYTES # BLD AUTO: 0.02 K/UL (ref 0–0.04)
IMM GRANULOCYTES NFR BLD AUTO: 0.3 % (ref 0–0.5)
LYMPHOCYTES # BLD AUTO: 1.7 K/UL (ref 1–4.8)
LYMPHOCYTES NFR BLD: 29.5 % (ref 18–48)
Lab: NORMAL
MCH RBC QN AUTO: 27.2 PG (ref 27–31)
MCHC RBC AUTO-ENTMCNC: 31 G/DL (ref 32–36)
MCV RBC AUTO: 88 FL (ref 82–98)
MICROSCOPIC EXAM: NORMAL
MONOCYTES # BLD AUTO: 0.6 K/UL (ref 0.3–1)
MONOCYTES NFR BLD: 9.7 % (ref 4–15)
NEUTROPHILS # BLD AUTO: 3.3 K/UL (ref 1.8–7.7)
NEUTROPHILS NFR BLD: 57.6 % (ref 38–73)
NRBC BLD-RTO: 0 /100 WBC
PLATELET # BLD AUTO: 322 K/UL (ref 150–450)
PMV BLD AUTO: 9.3 FL (ref 9.2–12.9)
POCT GLUCOSE: 122 MG/DL (ref 70–110)
POCT GLUCOSE: 180 MG/DL (ref 70–110)
POCT GLUCOSE: 206 MG/DL (ref 70–110)
POTASSIUM SERPL-SCNC: 3.6 MMOL/L (ref 3.5–5.1)
RBC # BLD AUTO: 3.38 M/UL (ref 4.6–6.2)
SODIUM SERPL-SCNC: 140 MMOL/L (ref 136–145)
WBC # BLD AUTO: 5.77 K/UL (ref 3.9–12.7)

## 2024-12-02 PROCEDURE — 25000003 PHARM REV CODE 250: Mod: HCNC | Performed by: PODIATRIST

## 2024-12-02 PROCEDURE — 97116 GAIT TRAINING THERAPY: CPT | Mod: HCNC,CQ

## 2024-12-02 PROCEDURE — 97535 SELF CARE MNGMENT TRAINING: CPT | Mod: HCNC,CO

## 2024-12-02 PROCEDURE — 94761 N-INVAS EAR/PLS OXIMETRY MLT: CPT | Mod: HCNC

## 2024-12-02 PROCEDURE — 80048 BASIC METABOLIC PNL TOTAL CA: CPT | Mod: HCNC | Performed by: INTERNAL MEDICINE

## 2024-12-02 PROCEDURE — 97110 THERAPEUTIC EXERCISES: CPT | Mod: HCNC,CQ

## 2024-12-02 PROCEDURE — 25000003 PHARM REV CODE 250: Mod: HCNC | Performed by: INTERNAL MEDICINE

## 2024-12-02 PROCEDURE — 63600175 PHARM REV CODE 636 W HCPCS: Mod: HCNC | Performed by: PODIATRIST

## 2024-12-02 PROCEDURE — 85025 COMPLETE CBC W/AUTO DIFF WBC: CPT | Mod: HCNC | Performed by: INTERNAL MEDICINE

## 2024-12-02 PROCEDURE — 11000001 HC ACUTE MED/SURG PRIVATE ROOM: Mod: HCNC

## 2024-12-02 PROCEDURE — 63600175 PHARM REV CODE 636 W HCPCS: Mod: HCNC | Performed by: INTERNAL MEDICINE

## 2024-12-02 PROCEDURE — 36415 COLL VENOUS BLD VENIPUNCTURE: CPT | Mod: HCNC | Performed by: INTERNAL MEDICINE

## 2024-12-02 RX ADMIN — SENNOSIDES AND DOCUSATE SODIUM 1 TABLET: 8.6; 5 TABLET ORAL at 09:12

## 2024-12-02 RX ADMIN — METRONIDAZOLE 500 MG: 500 TABLET ORAL at 02:12

## 2024-12-02 RX ADMIN — ENOXAPARIN SODIUM 40 MG: 40 INJECTION SUBCUTANEOUS at 05:12

## 2024-12-02 RX ADMIN — METRONIDAZOLE 500 MG: 500 TABLET ORAL at 05:12

## 2024-12-02 RX ADMIN — INSULIN GLARGINE 15 UNITS: 100 INJECTION, SOLUTION SUBCUTANEOUS at 09:12

## 2024-12-02 RX ADMIN — METRONIDAZOLE 500 MG: 500 TABLET ORAL at 09:12

## 2024-12-02 RX ADMIN — INSULIN ASPART 4 UNITS: 100 INJECTION, SOLUTION INTRAVENOUS; SUBCUTANEOUS at 04:12

## 2024-12-02 RX ADMIN — INSULIN ASPART 2 UNITS: 100 INJECTION, SOLUTION INTRAVENOUS; SUBCUTANEOUS at 11:12

## 2024-12-02 RX ADMIN — PANTOPRAZOLE SODIUM 40 MG: 40 TABLET, DELAYED RELEASE ORAL at 09:12

## 2024-12-02 RX ADMIN — INSULIN ASPART 1 UNITS: 100 INJECTION, SOLUTION INTRAVENOUS; SUBCUTANEOUS at 09:12

## 2024-12-02 RX ADMIN — CEFEPIME 2 G: 1 INJECTION, POWDER, FOR SOLUTION INTRAMUSCULAR; INTRAVENOUS at 03:12

## 2024-12-02 RX ADMIN — CEFEPIME 2 G: 1 INJECTION, POWDER, FOR SOLUTION INTRAMUSCULAR; INTRAVENOUS at 06:12

## 2024-12-02 RX ADMIN — CEFEPIME 2 G: 1 INJECTION, POWDER, FOR SOLUTION INTRAMUSCULAR; INTRAVENOUS at 11:12

## 2024-12-02 NOTE — NURSING
Assume pt. Care. Patient is AAOx4 and full code. Meds given per MAR. Dressing and external fixator were intact and no noted drainage. Patient denied any pain. Care ongoing.

## 2024-12-02 NOTE — PLAN OF CARE
Problem: Adult Inpatient Plan of Care  Goal: Plan of Care Review  Outcome: Progressing     Problem: Diabetes Comorbidity  Goal: Blood Glucose Level Within Targeted Range  Outcome: Progressing     Problem: Wound  Goal: Optimal Wound Healing  Outcome: Progressing     Problem: Infection  Goal: Absence of Infection Signs and Symptoms  Outcome: Progressing     Problem: Fall Injury Risk  Goal: Absence of Fall and Fall-Related Injury  Outcome: Progressing

## 2024-12-02 NOTE — PT/OT/SLP PROGRESS
Occupational Therapy   Treatment    Name: Indio Ryder Jr.  MRN: [id number]  Admitting Diagnosis:  Chronic osteomyelitis of left foot  5 Days Post-Op    Recommendations:     Discharge Recommendations: Low Intensity Therapy  Discharge Equipment Recommendations:   (possibly wc)  Barriers to discharge:  None    Assessment:     Indio Ryder Jr. is a 54 y.o. male with a medical diagnosis of Chronic osteomyelitis of left foot.  Performance deficits affecting function are weakness, impaired endurance, impaired functional mobility, decreased upper extremity function, decreased ROM, impaired skin, edema, orthopedic precautions.    Pt found in bed, agreeable to therapy.  Pt progressing well towards prior level of functioning. Continue OT services to address functional goals, progressing as able.      Rehab Prognosis:  Good; patient would benefit from acute skilled OT services to address these deficits and reach maximum level of function.       Plan:     Patient to be seen 3 x/week to address the above listed problems via self-care/home management, therapeutic activities, therapeutic exercises  Plan of Care Expires: 12/28/24  Plan of Care Reviewed with: patient    Subjective     Chief Complaint: no complaints   Patient/Family Comments/goals: to go home   Pain/Comfort:  Pain Rating 1: 0/10  Pain Rating Post-Intervention 1: 0/10    Objective:     Communicated with: nurse prior to session.  Patient found HOB elevated with bed alarm, peripheral IV upon OT entry to room.    General Precautions: Standard, fall, diabetic    Orthopedic Precautions:LLE non weight bearing  Braces:  (Ex Fix)  Respiratory Status: Room air     Occupational Performance:     Bed Mobility:    Patient completed Supine to Sit with modified independence  Patient completed Sit to Supine with modified independence     Functional Mobility/Transfers:  Patient completed Sit <> Stand Transfer with modified independence  with  knee scooter   Patient completed  Toilet Transfer Stand Pivot technique with modified independence with  knee scooter and grab bar  Functional Mobility: Pt using knee scooter for in room mobility at Mod I level.    Activities of Daily Living:  Toileting: modified independence simulated toileting in bathroom. Pt holds on to grab bar for clothing mgmt.       [facility] 6 Click ADL: 23    Treatment & Education:  Pt maintains LLE NWB status during functional mobility and transfers using knee scooter.  Pt reports he does not like to use crutches as he finds them more difficult to use.    Pt reports L shld w/ RCT.  Provided with tband exercises for UE strengthening.  Will review next session as pt eating lunch.   Pt declined transfer to chair.     Patient left HOB elevated with all lines intact, call button in reach, and bed alarm on    GOALS:   Multidisciplinary Problems       Occupational Therapy Goals          Problem: Occupational Therapy    Goal Priority Disciplines Outcome Interventions   Occupational Therapy Goal     OT, PT/OT Progressing    Description: Goals to be met by: 12/28     Patient will increase functional independence with ADLs by performing:    LE Dressing with Stand-by Assistance and Assistive Devices as needed.  Toileting from toilet with Stand-by Assistance for hygiene and clothing management.   Toilet transfer to toilet with Stand-by Assistance and maintaining weight-bearing precaution(s).                         Time Tracking:     OT Date of Treatment: 12/02/24  OT Start Time: 1135  OT Stop Time: 1150  OT Total Time (min): 15 min    Billable Minutes:Self Care/Home Management 15               12/2/2024

## 2024-12-02 NOTE — PLAN OF CARE
Problem: Physical Therapy  Goal: Physical Therapy Goal  Description: Goals to be met by: 2024     Patient will increase functional independence with mobility by performin. Sit to stand transfer with Modified [place]  2. Gait  x 150 feet with Modified [place] using Crutches.   3. Ascend/Descend 8 inch curb step with Modified [place] using Crutches.  5. Stand for 15 minutes with Modified [place] using Crutches    Outcome: Progressing

## 2024-12-02 NOTE — NURSING
Received report from demar Luke the patient lying supine with leg with supports in place at present, bed locked in low with alarm on and call light in reach at present, instructed to call for assistance.

## 2024-12-02 NOTE — PT/OT/SLP PROGRESS
Physical Therapy Treatment    Patient Name:  Indio Ryder Jr.   MRN:  [id number]    Recommendations:     Discharge Recommendations: Low Intensity Therapy  Discharge Equipment Recommendations: crutches, wheelchair (crutches in pt's room)  Barriers to discharge:  decreased mobility,strength and endurance    Assessment:     Indio Ryder Jr. is a 54 y.o. male admitted with a medical diagnosis of Chronic osteomyelitis of left foot.  He presents with the following impairments/functional limitations: weakness, impaired endurance, impaired functional mobility, gait instability, impaired balance, decreased lower extremity function, decreased ROM, impaired coordination, impaired skin, impaired joint extensibility, orthopedic precautions,pt with improving status and tolerated knee scooter w/o difficulty today,pt may benefit from  services upon discharge.    Rehab Prognosis: Good; patient would benefit from acute skilled PT services to address these deficits and reach maximum level of function.    Recent Surgery: Procedure(s) (LRB):  IRRIGATION AND DEBRIDEMENT (Left)  APPLICATION, EXTERNAL FIXATION SYSTEM, MULTIPLANAR, WITH IMAGING GUIDANCE (Left) 5 Days Post-Op    Plan:     During this hospitalization, patient to be seen 6 x/week to address the identified rehab impairments via gait training, therapeutic activities, therapeutic exercises, neuromuscular re-education and progress toward the following goals:    Plan of Care Expires:  12/28/24    Subjective     Chief Complaint: n/a  Patient/Family Comments/goals: pt states he prefers using his knee scooter.  Pain/Comfort:  Pain Rating 1: 0/10      Objective:     Communicated with nsg prior to session.  Patient found supine with external fixator, peripheral IV upon PT entry to room.     General Precautions: Standard, diabetic, fall  Orthopedic Precautions: LLE non weight bearing  Braces:  (L le ex fix)  Respiratory Status: Room air     Functional Mobility:  Bed Mobility:      Supine to Sit: modified independence  Sit to Supine: modified independence  Transfers:     Sit to Stand:  stand by assistance with no AD and knee scooter  Gait: amb ~60' with knee scooter NWB L le and S/SBA  Balance: good sitting balance      AM-PAC 6 CLICK MOBILITY  Turning over in bed (including adjusting bedclothes, sheets and blankets)?: 4  Sitting down on and standing up from a chair with arms (e.g., wheelchair, bedside commode, etc.): 3  Moving from lying on back to sitting on the side of the bed?: 4  Moving to and from a bed to a chair (including a wheelchair)?: 3  Need to walk in hospital room?: 3 (knee scooter)  Climbing 3-5 steps with a railing?: 1  Basic Mobility Total Score: 18       Treatment & Education: le supine qs and slr X 10 reps.      Patient left supine with all lines intact, call button in reach, and bed alarm on..    GOALS: see general POC  Multidisciplinary Problems       Physical Therapy Goals          Problem: Physical Therapy    Goal Priority Disciplines Outcome Interventions   Physical Therapy Goal     PT, PT/OT Progressing    Description: Goals to be met by: 2024     Patient will increase functional independence with mobility by performin. Sit to stand transfer with Modified [place]  2. Gait  x 150 feet with Modified [place] using Crutches.   3. Ascend/Descend 8 inch curb step with Modified [place] using Crutches.  5. Stand for 15 minutes with Modified [place] using Crutches                         Time Tracking:     PT Received On: 24  PT Start Time: 823     PT Stop Time: 847  PT Total Time (min): 24 min     Billable Minutes: Gait Training 14 and Therapeutic Exercise 10    Treatment Type: Treatment  PT/PTA: PTA     Number of PTA visits since last PT visit: 2     2024

## 2024-12-02 NOTE — SUBJECTIVE & OBJECTIVE
Interval History: Sitting up in chair watching the game, no complaints    Review of Systems   Constitutional:  Negative for chills, diaphoresis, fatigue and fever.   Respiratory:  Negative for cough, chest tightness, shortness of breath and wheezing.    Cardiovascular:  Negative for chest pain and leg swelling.   Gastrointestinal:  Negative for abdominal pain, constipation, diarrhea, nausea and vomiting.   Skin:  Positive for wound.     Objective:     Vital Signs (Most Recent):  Temp: 98.1 °F (36.7 °C) (12/01/24 1944)  Pulse: 97 (12/01/24 1944)  Resp: 20 (12/01/24 1944)  BP: (!) 140/82 (12/01/24 1944)  SpO2: 98 % (12/01/24 1944) Vital Signs (24h Range):  Temp:  [97.9 °F (36.6 °C)-98.7 °F (37.1 °C)] 98.1 °F (36.7 °C)  Pulse:  [87-97] 97  Resp:  [16-20] 20  SpO2:  [96 %-99 %] 98 %  BP: (124-159)/(70-82) 140/82     Weight: 98.1 kg (216 lb 4.3 oz)  Body mass index is 28.53 kg/m².    Intake/Output Summary (Last 24 hours) at 12/1/2024 2200  Last data filed at 12/1/2024 1944  Gross per 24 hour   Intake 856.74 ml   Output 1750 ml   Net -893.26 ml         Physical Exam  Vitals and nursing note reviewed.   Constitutional:       General: He is not in acute distress.     Appearance: He is not toxic-appearing.   HENT:      Head: Normocephalic and atraumatic.      Nose: No congestion.      Mouth/Throat:      Mouth: Mucous membranes are moist.      Pharynx: No oropharyngeal exudate or posterior oropharyngeal erythema.   Eyes:      Extraocular Movements: Extraocular movements intact.      Pupils: Pupils are equal, round, and reactive to light.   Cardiovascular:      Rate and Rhythm: Normal rate and regular rhythm.      Heart sounds: No murmur heard.     No friction rub. No gallop.   Pulmonary:      Effort: Pulmonary effort is normal. No respiratory distress.      Breath sounds: No wheezing or rales.   Chest:      Chest wall: No tenderness.   Abdominal:      General: Bowel sounds are normal. There is no distension.      Palpations:  Abdomen is soft.      Tenderness: There is no abdominal tenderness. There is no guarding or rebound.   Musculoskeletal:         General: Deformity present. No tenderness.      Cervical back: Neck supple. No rigidity or tenderness.   Skin:     Comments: Dressing in place, no oozing, no wound smelling   Neurological:      Mental Status: He is alert and oriented to person, place, and time.      Motor: No weakness.      Coordination: Coordination normal.      Comments: Left hand deformation and contraction   Psychiatric:         Thought Content: Thought content normal.             Significant Labs: All pertinent labs within the past 24 hours have been reviewed.  CBC:   Recent Labs   Lab 11/30/24 0527 12/01/24 0517   WBC 7.28 6.36   HGB 8.5* 8.6*   HCT 27.5* 27.7*    297     CMP:   Recent Labs   Lab 11/30/24 0527 12/01/24 0517    141   K 3.2* 3.5    108   CO2 25 25   GLU 98 100   BUN 13 12   CREATININE 0.8 0.8   CALCIUM 8.9 8.9   ANIONGAP 7* 8       Significant Imaging: I have reviewed all pertinent imaging results/findings within the past 24 hours.

## 2024-12-02 NOTE — PLAN OF CARE
Problem: Occupational Therapy  Goal: Occupational Therapy Goal  Description: Goals to be met by: 12/28     Patient will increase functional independence with ADLs by performing:    LE Dressing with Stand-by Assistance and Assistive Devices as needed.  Toileting from toilet with Stand-by Assistance for hygiene and clothing management.   MET 12/2  Toilet transfer to toilet with Stand-by Assistance and maintaining weight-bearing precaution(s). MET 12/2    Outcome: Progressing   Indio ALVARADO Britni Anderson is a 54 y.o. male with a medical diagnosis of Chronic osteomyelitis of left foot.  Performance deficits affecting function are weakness, impaired endurance, impaired functional mobility, decreased upper extremity function, decreased ROM, impaired skin, edema, orthopedic precautions.    Pt found in bed, agreeable to therapy.  Pt progressing well towards prior level of functioning. Continue OT services to address functional goals, progressing as able.

## 2024-12-02 NOTE — ASSESSMENT & PLAN NOTE
IMPLANTATION OF ANTIBIOTIC ELUDING CEMENT AND BIOACTIVE GLASS LEFT FOOT    OPEN BONE BIOPSY LEFT FOOT CALCANEUS    IRRIGATION AND DEBRIDEMENT of osteomyelitic bone (Left) foot  Defer to podiatry  F/U ID recommendation  Cont IV Abx with cefepime and metronidazole, per ID stop vancomycin, consult discontinued

## 2024-12-02 NOTE — PROGRESS NOTES
[facility]  Podiatry  Progress Note    Patient Name: Indio Ryder Jr.  MRN: [id number]  Admission Date: 11/27/2024  Hospital Length of Stay: 5 days  Attending Physician: Daniel Reyes DPM  Primary Care Provider: Lorena Thakur MD     Subjective:     Interval History:  No adverse events overnight.  Patient lying comfortably in bed.  External fixation and dressings clean, dry, intact.  Patient denies any pain to his LLE.  Denies fevers, chills, nausea, or vomiting.  Denies SOB or chest pain.  Denies any other pedal complaints.    Follow-up For: Procedure(s) (LRB):  IRRIGATION AND DEBRIDEMENT (Left)  APPLICATION, EXTERNAL FIXATION SYSTEM, MULTIPLANAR, WITH IMAGING GUIDANCE (Left)    Post-Operative Day: 5 Days Post-Op    Scheduled Meds:   ceFEPime IV (PEDS and ADULTS)  2 g Intravenous Q8H    enoxparin  40 mg Subcutaneous Daily    insulin glargine U-100  15 Units Subcutaneous QHS    metroNIDAZOLE  500 mg Oral Q8H    pantoprazole  40 mg Oral Daily    senna-docusate 8.6-50 mg  1 tablet Oral Daily     Continuous Infusions:  PRN Meds:  Current Facility-Administered Medications:     acetaminophen, 650 mg, Oral, Q4H PRN    dextrose 10%, 12.5 g, Intravenous, PRN    dextrose 10%, 25 g, Intravenous, PRN    glucagon (human recombinant), 1 mg, Intramuscular, PRN    glucose, 16 g, Oral, PRN    glucose, 24 g, Oral, PRN    HYDROmorphone, 1 mg, Intravenous, Q3H PRN    influenza, 0.5 mL, Intramuscular, vaccine x 1 dose    insulin aspart U-100, 0-10 Units, Subcutaneous, QID (AC + HS) PRN    ondansetron, 4 mg, Intravenous, Q8H PRN    oxyCODONE-acetaminophen, 1 tablet, Oral, Q4H PRN    oxyCODONE-acetaminophen, 1 tablet, Oral, Q4H PRN    sodium chloride 0.9%, 10 mL, Intravenous, PRN    Review of Systems   Constitutional:  Negative for chills, diaphoresis, fatigue and fever.   Respiratory:  Negative for cough, chest tightness, shortness of breath and wheezing.    Cardiovascular:  Negative for chest pain and leg swelling.    Gastrointestinal:  Negative for abdominal pain, constipation, diarrhea, nausea and vomiting.   Skin:  Positive for wound and external fixation device intact    Objective:     Vital Signs (Most Recent):  Temp: 97.7 °F (36.5 °C) (12/02/24 0719)  Pulse: 95 (12/02/24 0719)  Resp: 20 (12/02/24 0719)  BP: (!) 146/80 (12/02/24 0719)  SpO2: 98 % (12/02/24 0719) Vital Signs (24h Range):  Temp:  [97.7 °F (36.5 °C)-98.2 °F (36.8 °C)] 97.7 °F (36.5 °C)  Pulse:  [88-97] 95  Resp:  [16-20] 20  SpO2:  [96 %-99 %] 98 %  BP: (114-159)/(65-82) 146/80     Weight: 98.1 kg (216 lb 4.3 oz)  Body mass index is 28.53 kg/m².    Foot Exam    Left Foot/Ankle      Inspection and Palpation  Ecchymosis: none  Tenderness: bony tenderness   Swelling: dorsum   Skin Exam: skin changes;     Comments  LLE:  External fixation device intact.  Dressings clean, dry, intact.  No pain to extremity.    Laboratory:  BMP:   Recent Labs   Lab 12/02/24  0518   *      K 3.6      CO2 25   BUN 14   CREATININE 0.8   CALCIUM 9.1     CBC:   Recent Labs   Lab 12/02/24  0518   WBC 5.77   RBC 3.38*   HGB 9.2*   HCT 29.7*      MCV 88   MCH 27.2   MCHC 31.0*     Diagnostic Results:  I have reviewed all pertinent imaging results/findings within the past 24 hours.    Clinical Findings:    Assessment/Plan:     Cardiac/Vascular  Mixed hyperlipidemia  Medical management per Hospital Medicine.     Essential hypertension  Medical management per Hospital Medicine.     ID  * Chronic osteomyelitis of left foot  POD#5: Pending intra-op bone c&s and pathology from the calcaneus. The cuboid was excised and bioactive glass/antibiotic eluding cement implanted. Ex-fix left lower extremity well aligned and intact. IV antibiotic therapy per ID recommendations. ID reccomending to continue cefepime and metronidazole for the moment. Anticipate 3-4 days prior to final c&s and antibiotic recommendations made. Will need HH upon discharge with possible PICC line. NWB  left lower extremity for now but may apply partial weight for transfers. Elevate above heart level at rest.     -Dressing C/D/I with no strikethrough noted, patient resting comfortably with pain appropriately controlled. Patients limb re-elevated to appropriate position.     Endocrine  Type 2 diabetes mellitus with hyperglycemia, with long-term current use of insulin  Medical management per Hospital Medicine.       Ida Rudd DPM  Podiatry  [facility]

## 2024-12-02 NOTE — PROGRESS NOTES
[facility] Medicine  Progress Note    Patient Name: Indio Ryder Jr.  MRN: [id number]  Patient Class: IP- Inpatient   Admission Date: 11/27/2024  Length of Stay: 4 days  Attending Physician: Daniel Reyes DPM  Primary Care Provider: Lorena Thakur MD        Subjective:     Principal Problem:Chronic osteomyelitis of left foot        HPI:  I have been asked by Dr Reyes to see Mr Britni Borjas for medical management s/p IMPLANTATION OF ANTIBIOTIC ELUDING CEMENT AND BIOACTIVE GLASS LEFT FOOT, OPEN BONE BIOPSY LEFT FOOT CALCANEUS, ACHILLES TENDON LENGTHENING LEFT. APPLICATION, MULTIPLE PLANE EXTERNAL FIXATION SYSTEM LEFT LOWER EXTREMITY, EXOSTECTOMY LEFT ANKLE, IRRIGATION AND DEBRIDEMENT of osteomyelitic bone (Left) foot for Chronic multifocal osteomyelitis of left foot, Subluxation of tarsal joint of foot, left, sequela, Acquired varus deformity of foot, left. Patient is 55 YO M with PMHx of diabetes Melitus, with complication of charcot foot, subacute osteomyelitis right foot, HTN, HLD, colon adenocarcinoma. Patient seen stated since having this foot infection his diabetes has not been well controlled with last A1C on 10/24 was 11. Stated post procedure he's feeling okay, denies foot pain wound dressing clean, Ex-fix applied to stabilize bone and hold reduction of ankle. Denies fever no CP or SOB.    Overview/Hospital Course:  12/1 On abx per ID, stable    Interval History: Sitting up in chair watching the game, no complaints    Review of Systems   Constitutional:  Negative for chills, diaphoresis, fatigue and fever.   Respiratory:  Negative for cough, chest tightness, shortness of breath and wheezing.    Cardiovascular:  Negative for chest pain and leg swelling.   Gastrointestinal:  Negative for abdominal pain, constipation, diarrhea, nausea and vomiting.   Skin:  Positive for wound.     Objective:     Vital Signs (Most Recent):  Temp: 98.1 °F (36.7 °C) (12/01/24 1944)  Pulse: 97  (12/01/24 1944)  Resp: 20 (12/01/24 1944)  BP: (!) 140/82 (12/01/24 1944)  SpO2: 98 % (12/01/24 1944) Vital Signs (24h Range):  Temp:  [97.9 °F (36.6 °C)-98.7 °F (37.1 °C)] 98.1 °F (36.7 °C)  Pulse:  [87-97] 97  Resp:  [16-20] 20  SpO2:  [96 %-99 %] 98 %  BP: (124-159)/(70-82) 140/82     Weight: 98.1 kg (216 lb 4.3 oz)  Body mass index is 28.53 kg/m².    Intake/Output Summary (Last 24 hours) at 12/1/2024 2200  Last data filed at 12/1/2024 1944  Gross per 24 hour   Intake 856.74 ml   Output 1750 ml   Net -893.26 ml         Physical Exam  Vitals and nursing note reviewed.   Constitutional:       General: He is not in acute distress.     Appearance: He is not toxic-appearing.   HENT:      Head: Normocephalic and atraumatic.      Nose: No congestion.      Mouth/Throat:      Mouth: Mucous membranes are moist.      Pharynx: No oropharyngeal exudate or posterior oropharyngeal erythema.   Eyes:      Extraocular Movements: Extraocular movements intact.      Pupils: Pupils are equal, round, and reactive to light.   Cardiovascular:      Rate and Rhythm: Normal rate and regular rhythm.      Heart sounds: No murmur heard.     No friction rub. No gallop.   Pulmonary:      Effort: Pulmonary effort is normal. No respiratory distress.      Breath sounds: No wheezing or rales.   Chest:      Chest wall: No tenderness.   Abdominal:      General: Bowel sounds are normal. There is no distension.      Palpations: Abdomen is soft.      Tenderness: There is no abdominal tenderness. There is no guarding or rebound.   Musculoskeletal:         General: Deformity present. No tenderness.      Cervical back: Neck supple. No rigidity or tenderness.   Skin:     Comments: Dressing in place, no oozing, no wound smelling   Neurological:      Mental Status: He is alert and oriented to person, place, and time.      Motor: No weakness.      Coordination: Coordination normal.      Comments: Left hand deformation and contraction   Psychiatric:          Thought Content: Thought content normal.             Significant Labs: All pertinent labs within the past 24 hours have been reviewed.  CBC:   Recent Labs   Lab 11/30/24 0527 12/01/24  0517   WBC 7.28 6.36   HGB 8.5* 8.6*   HCT 27.5* 27.7*    297     CMP:   Recent Labs   Lab 11/30/24 0527 12/01/24  0517    141   K 3.2* 3.5    108   CO2 25 25   GLU 98 100   BUN 13 12   CREATININE 0.8 0.8   CALCIUM 8.9 8.9   ANIONGAP 7* 8       Significant Imaging: I have reviewed all pertinent imaging results/findings within the past 24 hours.    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  Monitor FS, cont ISS      Subacute osteomyelitis of left foot  Defer to podiatry and ID  Cont current ABx      Type 2 diabetes mellitus with hyperglycemia, with long-term current use of insulin        Mixed hyperlipidemia  Cont statin      Essential hypertension  Patients blood pressure range in the last 24 hours was: BP  Min: 107/63  Max: 171/104.The patient's inpatient anti-hypertensive regimen is listed below:  Current Antihypertensives       Plan  - BP is controlled, no changes needed to their regimen  - cont home meds      VTE Risk Mitigation (From admission, onward)           Ordered     enoxaparin injection 40 mg  Daily         11/27/24 1605     IP VTE HIGH RISK PATIENT  Once         11/27/24 1605     Place sequential compression device  Until discontinued         11/27/24 1605                    Discharge Planning   FRANCISCO: 12/3/2024     Code Status: Full Code   Is the patient medically ready for discharge?:     Reason for patient still in  hospital (select all that apply): Patient trending condition and Consult recommendations  Discharge Plan A: Home with family                  Norma Thompson MD  Department of Hospital Medicine   [facility]

## 2024-12-02 NOTE — ASSESSMENT & PLAN NOTE
POD#5: Pending intra-op bone c&s and pathology from the calcaneus. The cuboid was excised and bioactive glass/antibiotic eluding cement implanted. Ex-fix left lower extremity well aligned and intact. IV antibiotic therapy per ID recommendations. Anticipate 3-4 days prior to final c&s and antibiotic recommendations made. Will need HH upon discharge with possible PICC line. NWB left lower extremity for now but may apply partial weight for transfers. Elevate above heart level at rest.     -Dressing C/D/I with no strikethrough noted, patient resting comfortably with pain appropriately controlled. Patients limb re-elevated to appropriate position.

## 2024-12-02 NOTE — PLAN OF CARE
0966  HH referrals sent via [facility]. Awaiting response.    Previously scheduled appt with Dr Reyes (pod) on 12/16/2024 at 1600 noted. Information added to the pt's discharge paperwork.       12/02/24 1120   Rounds   Attendance Provider;Nurse    Discharge Plan A Home Health   Why the patient remains in the hospital Requires continued medical care       1120  CM was informed by Dr Thompson that the pt is not medically stable to discharge due to pending cx results. Pt was admitted with left foot ostoemyelitis, is s/p left foot I&D with external fixator placement done by Dr Reyes on 11/27/2024, & continues to be followed by pod, ID, & PT/OT.     1230  Patient resting quietly in bed when CM rounded. Pt denied pain at this time. CM informed the pt that he will need IV abx following discharge & of HH referrals sent. Pt verbalized understanding, stated he has received IV abx from [facility] in the past, & would like to again.     1615  Patient accepted by [facility]. Information added to the pt's discharge paperwork.     Referral sent to [facility] via [facility]. Awaiting response.       Will continue to follow.

## 2024-12-02 NOTE — SUBJECTIVE & OBJECTIVE
Subjective:     Interval History:  No adverse events overnight.  Patient lying comfortably in bed.  External fixation and dressings clean, dry, intact.  Patient denies any pain to his LLE.  Denies fevers, chills, nausea, or vomiting.  Denies SOB or chest pain.  Denies any other pedal complaints.    Follow-up For: Procedure(s) (LRB):  IRRIGATION AND DEBRIDEMENT (Left)  APPLICATION, EXTERNAL FIXATION SYSTEM, MULTIPLANAR, WITH IMAGING GUIDANCE (Left)    Post-Operative Day: 5 Days Post-Op    Scheduled Meds:   ceFEPime IV (PEDS and ADULTS)  2 g Intravenous Q8H    enoxparin  40 mg Subcutaneous Daily    insulin glargine U-100  15 Units Subcutaneous QHS    metroNIDAZOLE  500 mg Oral Q8H    pantoprazole  40 mg Oral Daily    senna-docusate 8.6-50 mg  1 tablet Oral Daily     Continuous Infusions:  PRN Meds:  Current Facility-Administered Medications:     acetaminophen, 650 mg, Oral, Q4H PRN    dextrose 10%, 12.5 g, Intravenous, PRN    dextrose 10%, 25 g, Intravenous, PRN    glucagon (human recombinant), 1 mg, Intramuscular, PRN    glucose, 16 g, Oral, PRN    glucose, 24 g, Oral, PRN    HYDROmorphone, 1 mg, Intravenous, Q3H PRN    influenza, 0.5 mL, Intramuscular, vaccine x 1 dose    insulin aspart U-100, 0-10 Units, Subcutaneous, QID (AC + HS) PRN    ondansetron, 4 mg, Intravenous, Q8H PRN    oxyCODONE-acetaminophen, 1 tablet, Oral, Q4H PRN    oxyCODONE-acetaminophen, 1 tablet, Oral, Q4H PRN    sodium chloride 0.9%, 10 mL, Intravenous, PRN    Review of Systems   Constitutional:  Negative for chills, diaphoresis, fatigue and fever.   Respiratory:  Negative for cough, chest tightness, shortness of breath and wheezing.    Cardiovascular:  Negative for chest pain and leg swelling.   Gastrointestinal:  Negative for abdominal pain, constipation, diarrhea, nausea and vomiting.   Skin:  Positive for wound and external fixation device intact    Objective:     Vital Signs (Most Recent):  Temp: 97.7 °F (36.5 °C) (12/02/24 0719)  Pulse:  95 (12/02/24 0719)  Resp: 20 (12/02/24 0719)  BP: (!) 146/80 (12/02/24 0719)  SpO2: 98 % (12/02/24 0719) Vital Signs (24h Range):  Temp:  [97.7 °F (36.5 °C)-98.2 °F (36.8 °C)] 97.7 °F (36.5 °C)  Pulse:  [88-97] 95  Resp:  [16-20] 20  SpO2:  [96 %-99 %] 98 %  BP: (114-159)/(65-82) 146/80     Weight: 98.1 kg (216 lb 4.3 oz)  Body mass index is 28.53 kg/m².    Foot Exam    Left Foot/Ankle      Inspection and Palpation  Ecchymosis: none  Tenderness: bony tenderness   Swelling: dorsum   Skin Exam: skin changes;     Comments  LLE:  External fixation device intact.  Dressings clean, dry, intact.  No pain to extremity.    Laboratory:  BMP:   Recent Labs   Lab 12/02/24  0518   *      K 3.6      CO2 25   BUN 14   CREATININE 0.8   CALCIUM 9.1     CBC:   Recent Labs   Lab 12/02/24 0518   WBC 5.77   RBC 3.38*   HGB 9.2*   HCT 29.7*      MCV 88   MCH 27.2   MCHC 31.0*     Diagnostic Results:  I have reviewed all pertinent imaging results/findings within the past 24 hours.    Clinical Findings:

## 2024-12-03 VITALS
SYSTOLIC BLOOD PRESSURE: 143 MMHG | TEMPERATURE: 98 F | OXYGEN SATURATION: 99 % | HEART RATE: 87 BPM | BODY MASS INDEX: 28.66 KG/M2 | RESPIRATION RATE: 18 BRPM | WEIGHT: 216.25 LBS | HEIGHT: 73 IN | DIASTOLIC BLOOD PRESSURE: 84 MMHG

## 2024-12-03 LAB
ANION GAP SERPL CALC-SCNC: 11 MMOL/L (ref 8–16)
BASOPHILS # BLD AUTO: 0.02 K/UL (ref 0–0.2)
BASOPHILS NFR BLD: 0.4 % (ref 0–1.9)
BUN SERPL-MCNC: 14 MG/DL (ref 6–20)
CALCIUM SERPL-MCNC: 9.2 MG/DL (ref 8.7–10.5)
CHLORIDE SERPL-SCNC: 106 MMOL/L (ref 95–110)
CO2 SERPL-SCNC: 23 MMOL/L (ref 23–29)
CREAT SERPL-MCNC: 0.8 MG/DL (ref 0.5–1.4)
DIFFERENTIAL METHOD BLD: ABNORMAL
EOSINOPHIL # BLD AUTO: 0.2 K/UL (ref 0–0.5)
EOSINOPHIL NFR BLD: 3 % (ref 0–8)
ERYTHROCYTE [DISTWIDTH] IN BLOOD BY AUTOMATED COUNT: 14.4 % (ref 11.5–14.5)
EST. GFR  (NO RACE VARIABLE): >60 ML/MIN/1.73 M^2
GLUCOSE SERPL-MCNC: 96 MG/DL (ref 70–110)
HCT VFR BLD AUTO: 29.1 % (ref 40–54)
HGB BLD-MCNC: 9.1 G/DL (ref 14–18)
IMM GRANULOCYTES # BLD AUTO: 0.02 K/UL (ref 0–0.04)
IMM GRANULOCYTES NFR BLD AUTO: 0.4 % (ref 0–0.5)
LYMPHOCYTES # BLD AUTO: 1.8 K/UL (ref 1–4.8)
LYMPHOCYTES NFR BLD: 32.8 % (ref 18–48)
MCH RBC QN AUTO: 27.4 PG (ref 27–31)
MCHC RBC AUTO-ENTMCNC: 31.3 G/DL (ref 32–36)
MCV RBC AUTO: 88 FL (ref 82–98)
MONOCYTES # BLD AUTO: 0.6 K/UL (ref 0.3–1)
MONOCYTES NFR BLD: 11.3 % (ref 4–15)
NEUTROPHILS # BLD AUTO: 2.8 K/UL (ref 1.8–7.7)
NEUTROPHILS NFR BLD: 52.1 % (ref 38–73)
NRBC BLD-RTO: 0 /100 WBC
PLATELET # BLD AUTO: 370 K/UL (ref 150–450)
PMV BLD AUTO: 9.5 FL (ref 9.2–12.9)
POCT GLUCOSE: 115 MG/DL (ref 70–110)
POCT GLUCOSE: 140 MG/DL (ref 70–110)
POCT GLUCOSE: 204 MG/DL (ref 70–110)
POTASSIUM SERPL-SCNC: 3.5 MMOL/L (ref 3.5–5.1)
RBC # BLD AUTO: 3.32 M/UL (ref 4.6–6.2)
SODIUM SERPL-SCNC: 140 MMOL/L (ref 136–145)
WBC # BLD AUTO: 5.39 K/UL (ref 3.9–12.7)

## 2024-12-03 PROCEDURE — 97530 THERAPEUTIC ACTIVITIES: CPT | Mod: HCNC,CQ

## 2024-12-03 PROCEDURE — 25000003 PHARM REV CODE 250: Mod: HCNC | Performed by: INTERNAL MEDICINE

## 2024-12-03 PROCEDURE — 25000003 PHARM REV CODE 250: Mod: HCNC | Performed by: PODIATRIST

## 2024-12-03 PROCEDURE — 94761 N-INVAS EAR/PLS OXIMETRY MLT: CPT | Mod: HCNC

## 2024-12-03 PROCEDURE — 85025 COMPLETE CBC W/AUTO DIFF WBC: CPT | Mod: HCNC | Performed by: INTERNAL MEDICINE

## 2024-12-03 PROCEDURE — 97116 GAIT TRAINING THERAPY: CPT | Mod: HCNC,CQ

## 2024-12-03 PROCEDURE — 36415 COLL VENOUS BLD VENIPUNCTURE: CPT | Mod: HCNC | Performed by: INTERNAL MEDICINE

## 2024-12-03 PROCEDURE — 80048 BASIC METABOLIC PNL TOTAL CA: CPT | Mod: HCNC | Performed by: INTERNAL MEDICINE

## 2024-12-03 PROCEDURE — C1751 CATH, INF, PER/CENT/MIDLINE: HCPCS | Mod: HCNC

## 2024-12-03 PROCEDURE — 36569 INSJ PICC 5 YR+ W/O IMAGING: CPT | Mod: HCNC

## 2024-12-03 PROCEDURE — 63600175 PHARM REV CODE 636 W HCPCS: Mod: HCNC | Performed by: INTERNAL MEDICINE

## 2024-12-03 PROCEDURE — 97110 THERAPEUTIC EXERCISES: CPT | Mod: HCNC,CO

## 2024-12-03 RX ORDER — SODIUM CHLORIDE 0.9 % (FLUSH) 0.9 %
10 SYRINGE (ML) INJECTION EVERY 12 HOURS PRN
Status: DISCONTINUED | OUTPATIENT
Start: 2024-12-03 | End: 2024-12-03 | Stop reason: HOSPADM

## 2024-12-03 RX ORDER — CEFEPIME HYDROCHLORIDE 1 G/1
2 INJECTION, POWDER, FOR SOLUTION INTRAMUSCULAR; INTRAVENOUS EVERY 8 HOURS
Qty: 168 G | Refills: 0 | Status: SHIPPED | OUTPATIENT
Start: 2024-12-03 | End: 2024-12-31

## 2024-12-03 RX ORDER — METRONIDAZOLE 500 MG/1
500 TABLET ORAL EVERY 8 HOURS
Qty: 84 TABLET | Refills: 0 | Status: SHIPPED | OUTPATIENT
Start: 2024-12-03 | End: 2024-12-31

## 2024-12-03 RX ADMIN — SENNOSIDES AND DOCUSATE SODIUM 1 TABLET: 8.6; 5 TABLET ORAL at 09:12

## 2024-12-03 RX ADMIN — CEFEPIME 2 G: 1 INJECTION, POWDER, FOR SOLUTION INTRAMUSCULAR; INTRAVENOUS at 03:12

## 2024-12-03 RX ADMIN — METRONIDAZOLE 500 MG: 500 TABLET ORAL at 05:12

## 2024-12-03 RX ADMIN — PANTOPRAZOLE SODIUM 40 MG: 40 TABLET, DELAYED RELEASE ORAL at 09:12

## 2024-12-03 RX ADMIN — CEFEPIME 2 G: 1 INJECTION, POWDER, FOR SOLUTION INTRAMUSCULAR; INTRAVENOUS at 11:12

## 2024-12-03 RX ADMIN — METRONIDAZOLE 500 MG: 500 TABLET ORAL at 02:12

## 2024-12-03 NOTE — PLAN OF CARE
Problem: Occupational Therapy  Goal: Occupational Therapy Goal  Description: Goals to be met by: 12/28     Patient will increase functional independence with ADLs by performing:    LE Dressing with Stand-by Assistance and Assistive Devices as needed. MET 12/3  Toileting from toilet with Stand-by Assistance for hygiene and clothing management.   MET 12/2  Toilet transfer to toilet with Stand-by Assistance and maintaining weight-bearing precaution(s). MET 12/2    Outcome: Met   Indio Ryder Jr. is a 54 y.o. male with a medical diagnosis of Chronic osteomyelitis of left foot.  Performance deficits affecting function are weakness, impaired endurance, impaired self care skills, impaired functional mobility, decreased upper extremity function, decreased lower extremity function, decreased ROM, edema, impaired skin, orthopedic precautions.    Pt found in bed, agreeable to therapy. Pt has met all functional goals.  Pt to discharge home today. No OT needs at this time.

## 2024-12-03 NOTE — PLAN OF CARE
Problem: Adult Inpatient Plan of Care  Goal: Plan of Care Review  Outcome: Progressing  Goal: Patient-Specific Goal (Individualized)  Outcome: Progressing  Goal: Absence of Hospital-Acquired Illness or Injury  Outcome: Progressing  Goal: Optimal Comfort and Wellbeing  Outcome: Progressing  Goal: Readiness for Transition of Care  Outcome: Progressing     Problem: Diabetes Comorbidity  Goal: Blood Glucose Level Within Targeted Range  Outcome: Progressing     Problem: Wound  Goal: Optimal Coping  Outcome: Progressing  Goal: Optimal Functional Ability  Outcome: Progressing  Goal: Absence of Infection Signs and Symptoms  Outcome: Progressing  Goal: Improved Oral Intake  Outcome: Progressing  Goal: Optimal Pain Control and Function  Outcome: Progressing  Goal: Skin Health and Integrity  Outcome: Progressing  Goal: Optimal Wound Healing  Outcome: Progressing     Problem: Infection  Goal: Absence of Infection Signs and Symptoms  Outcome: Progressing     Problem: Comorbidity Management  Goal: Blood Pressure in Desired Range  Outcome: Progressing     Problem: Fall Injury Risk  Goal: Absence of Fall and Fall-Related Injury  Outcome: Progressing     Problem: Skin Injury Risk Increased  Goal: Skin Health and Integrity  Outcome: Progressing     Problem: Surgery Nonspecified  Goal: Effective Bowel Elimination  Outcome: Progressing  Goal: Fluid and Electrolyte Balance  Outcome: Progressing  Goal: Blood Glucose Level Within Targeted Range  Outcome: Progressing  Goal: Absence of Infection Signs and Symptoms  Outcome: Progressing  Goal: Optimal Pain Control and Function  Outcome: Progressing  Goal: Nausea and Vomiting Relief  Outcome: Progressing   Patient tolerated iv meds well, medicated prn for glucose control, reports no pain at present, voiding with out difficulty, positioned for comfort.

## 2024-12-03 NOTE — PT/OT/SLP PROGRESS
Physical Therapy Treatment    Patient Name:  Indio Ryder Jr.   MRN:  [id number]    Recommendations:     Discharge Recommendations: Low Intensity Therapy  Discharge Equipment Recommendations: crutches, wheelchair (crutches in pt's room)  Barriers to discharge:  decreased mobility,strength and endurance    Assessment:     Indio Ryder Jr. is a 54 y.o. male admitted with a medical diagnosis of Chronic osteomyelitis of left foot.  He presents with the following impairments/functional limitations: weakness, impaired endurance, impaired functional mobility, gait instability, impaired balance, decreased lower extremity function, decreased ROM, impaired coordination, impaired skin, impaired joint extensibility, orthopedic precautions,pt with improving mobility and may benefit from low intensity therapy upon discharge.    Rehab Prognosis: Good; patient would benefit from acute skilled PT services to address these deficits and reach maximum level of function.    Recent Surgery: Procedure(s) (LRB):  IRRIGATION AND DEBRIDEMENT (Left)  APPLICATION, EXTERNAL FIXATION SYSTEM, MULTIPLANAR, WITH IMAGING GUIDANCE (Left) 6 Days Post-Op    Plan:     During this hospitalization, patient to be seen 2 x/week to address the identified rehab impairments via gait training, therapeutic activities, therapeutic exercises, neuromuscular re-education and progress toward the following goals:    Plan of Care Expires:  12/28/24    Subjective     Chief Complaint: n/a  Patient/Family Comments/goals: pt may go home today or tomorrow.  Pain/Comfort:  Pain Rating 1: 0/10      Objective:     Communicated with nsg prior to session.  Patient found supine with peripheral IV upon PT entry to room.     General Precautions: Standard, diabetic, fall  Orthopedic Precautions: LLE non weight bearing  Braces:  (ex fix)  Respiratory Status: Room air     Functional Mobility:  Bed Mobility:     Supine to Sit: modified independence  Sit to Supine: modified  independence  Transfers:     Sit to Stand:  modified independence and supervision with no AD  Gait: amb ~375' with knee scooter NWB at L le and Mod I/S  Balance: fair kneeling balance on scooter      AM-PAC 6 CLICK MOBILITY  Turning over in bed (including adjusting bedclothes, sheets and blankets)?: 4  Sitting down on and standing up from a chair with arms (e.g., wheelchair, bedside commode, etc.): 3  Moving from lying on back to sitting on the side of the bed?: 4  Moving to and from a bed to a chair (including a wheelchair)?: 3  Need to walk in hospital room?: 3  Climbing 3-5 steps with a railing?: 1  Basic Mobility Total Score: 18       Treatment & Education: pt has no steps or curbs to contend with,answered pt's questions/concerns upon possible  discharge.      Patient left supine with all lines intact and call button in reach..    GOALS: see general POC  Multidisciplinary Problems       Physical Therapy Goals          Problem: Physical Therapy    Goal Priority Disciplines Outcome Interventions   Physical Therapy Goal     PT, PT/OT Progressing    Description: Goals to be met by: 2024     Patient will increase functional independence with mobility by performin. Sit to stand transfer with Modified [place]  2. Gait  x 150 feet with Modified [place] using Crutches.   3. Ascend/Descend 8 inch curb step with Modified [place] using Crutches.  5. Stand for 15 minutes with Modified [place] using Crutches                         Time Tracking:     PT Received On: 24  PT Start Time: 822     PT Stop Time: 846  PT Total Time (min): 24 min     Billable Minutes: Gait Training 15 and Therapeutic Activity 9    Treatment Type: Treatment  PT/PTA: PTA     Number of PTA visits since last PT visit: 3     2024

## 2024-12-03 NOTE — PLAN OF CARE
"   12/03/24 1055   Rounds   Attendance Provider;Nurse    Discharge Plan A Home Health  ([facility])       1055  Patient resting quietly in bed when CM participated in SIBR with Dr Valadez, Dr Norton (ID) & nurse Moreau. MD informed the pt that he is medically stable to discharge home with HH & IV abx after PICC line is placed. Pt verbalized understanding & agreement.     1235  "Pt Choice" form signed by the pt & placed in his chart. Pt stated that his sister will provide transportation at time of discharge & verbalized understanding regarding the scheduled hospfu appt with Dr Reyes (pod) on 12/26/2024 at 1600.    1350  CM was informed by nurse Moreau that the pt's PICC is being placed at the bedside. CM informed Farrah ([phone]) w/[facility] of above. Farrah stated that she will provide education this afternoon.     1415  HH orders sent to [facility] via [facility].     Message sent to the schedulers requesting a hospfu appt with Dr Norton (ID) in 4 weeks. Patient will be notified of appt date & time. Information added to the pt's discharge paperwork.     1435  Message sent to nurse Moreau, charge nurse Deena, & virtual nurse Johnnie informing that the pt is cleared to discharge after teaching is completed.      CM informed Lizzeth that per PT Zachary the pt can take the crutches at the bedside home.     1445  CM was informed by Farrah that IV abx administration teaching has been successfully completed.       Will continue to follow.     "
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
## 2024-12-03 NOTE — PROGRESS NOTES
[facility] note    Patient afebrile.  WBC 5.4.  On cefepime and metronidazole.    Foot wrapped.  Photographs noted.    Cultures all negative so far.  Aerobic cultures no growth    Discussed with patient and primary team and Podiatry.  Because of concern for Pseudomonas still being present, we will go ahead and send outpatient on cefepime and oral metronidazole.  Target for 6 weeks    Weekly CBC, CMP  Follow up in podiatry  Follow up with [place] [facility] in 4 weeks    Please call if any questions   Av Gregorio  [facility]  [phone]

## 2024-12-03 NOTE — NURSING
Received report from Ti received the patient sitting up in bed wound care per md, patient reports 0 pain at present.

## 2024-12-03 NOTE — PLAN OF CARE
VN reviewed discharge instructions with pt. Using teach back method.  AVS printed and handed to pt by bedside nurse.  Reviewed follow-up appointments, medications, diet, and importance of medication compliance.  Reviewed home care instructions, treatment plan, self-management, and when to seek medical attention.  Allowed time for questions.  All questions answered.  Patient verbalized complete understanding of discharge instructions and voices no concerns.     Discharge instructions complete.  Bedside delivery done.  Pt waiting on ride home.   Bedside nurse notified.

## 2024-12-03 NOTE — PROGRESS NOTES
[facility] nurse educator met with patient to discuss discharge plan for home IVATB. Indio Ryder will dc home with family support. Patient will infuse medication via IVP. Patient educated on S.A.S.H procedure. Written instruction on S.A.S.H mat provided. Patient educated to alternate PICC lumen use with each dose of medication daily. Patient instructed to administer 20mL of Cefepime over 10 minutes by flushing 1mL/1 line every 30 seconds. Patient education checklist reviewed and acknowledged by above person(s) and are agreeable to discharge with home infusion plan of care. IV administration process using aspetic technique was reviewed with successful return demonstration. Patient feels comfortable with infusion and has been on service with us multiple times in the past. Patient will dc home with Cefepime 2 grams every 8 hours with home dosing times of 7am, 3pm and 11 pm. Estimated EOC is pending but will be at least 4 weeks. Patient has a right upper arm DL picc line that was placed on 12/3/24 at 41cm. Patient instructed on picc line care and the s/s of picc line infection. He verbalized understanding. Placed extension tubing on both lumens of picc line for self infusion. Red lumen flushed with 1 mL of NS without difficulty. Purple lumen would not flush. Notified patient's bedside nurse and Pancho picc nurse via secure chat. Tube gauze applied to right upper arm. [facility] will follow patient for weekly dressing changes and lab draws. Time allotted for questions. Patients nurse and case management team notified teaching has been completed.     Medication delivery will be made to home    Farrah Conrad RN, BSN  Clinical Liaison   [facility]  Cell [phone]  Available M-F 8:30-5pm  Office [phone]  Available 24/7

## 2024-12-03 NOTE — PLAN OF CARE
Problem: Physical Therapy  Goal: Physical Therapy Goal  Description: Goals to be met by: 2024     Patient will increase functional independence with mobility by performin. Sit to stand transfer with Modified [place]  2. Gait  x 150 feet with Modified [place] using Crutches.   3. Ascend/Descend 8 inch curb step with Modified [place] using Crutches.  5. Stand for 15 minutes with Modified [place] using Crutches    Outcome: Progressing

## 2024-12-03 NOTE — PROCEDURES
"Indio Ryder Jr. is a 54 y.o. male patient.    Temp: 97.9 °F (36.6 °C) (12/03/24 1132)  Pulse: 87 (12/03/24 1141)  Resp: 18 (12/03/24 0729)  BP: (!) 143/84 (12/03/24 1132)  SpO2: 99 % (12/03/24 1141)  Weight: 98.1 kg (216 lb 4.3 oz) (11/27/24 1758)  Height: 6' 1" (185.4 cm) (11/27/24 1758)    PICC  Date/Time: 12/3/2024 1:55 PM  Performed by: Pancho Herrera RN  Consent Done: Yes  Time out: Immediately prior to procedure a time out was called to verify the correct patient, procedure, equipment, support staff and site/side marked as required  Indications: med administration  Anesthesia: local infiltration  Local anesthetic: lidocaine 1% without epinephrine  Anesthetic Total (mL): 1  Preparation: skin prepped with ChloraPrep  Skin prep agent dried: skin prep agent completely dried prior to procedure  Sterile barriers: all five maximum sterile barriers used - cap, mask, sterile gown, sterile gloves, and large sterile sheet  Hand hygiene: hand hygiene performed prior to central venous catheter insertion  Location details: right basilic  Catheter type: double lumen  Catheter size: 5 Fr  Catheter Length: 41cm    Ultrasound guidance: yes  Vessel Caliber: medium and large and patent, compressibility normal  Needle advanced into vessel with real time Ultrasound guidance.  Guidewire confirmed in vessel.  Sterile sheath used.  Number of attempts: 1  Post-procedure: blood return through all ports, chlorhexidine patch and sterile dressing applied  Estimated blood loss (mL): 0            Name Pancho Herrera   12/3/2024    "
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
## 2024-12-03 NOTE — PLAN OF CARE
Problem: Adult Inpatient Plan of Care  Goal: Plan of Care Review  Outcome: Progressing     Problem: Wound  Goal: Optimal Pain Control and Function  Outcome: Progressing  Goal: Skin Health and Integrity  Outcome: Progressing  Goal: Optimal Wound Healing  Outcome: Progressing

## 2024-12-04 ENCOUNTER — TELEPHONE (OUTPATIENT)
Dept: INFECTIOUS DISEASES | Facility: CLINIC | Age: 54
End: 2024-12-04
Payer: MEDICARE

## 2024-12-04 LAB — BACTERIA SPEC ANAEROBE CULT: NORMAL

## 2024-12-04 NOTE — PLAN OF CARE
[facility]  Discharge Final Note    Primary Care Provider: Lorena Thakur MD    Expected Discharge Date: 12/3/2024    Final Discharge Note (most recent)       Final Note - 12/04/24 0728          Final Note    Assessment Type Final Discharge Note (P)      Anticipated Discharge Disposition Home-Health Care Svc (P)    [place]    Hospital Resources/Appts/Education Provided Appointments scheduled and added to AVS (P)         Post-Acute Status    Post-Acute Authorization Home Health;IV Infusion (P)      Home Health Status Set-up Complete/Auth obtained (P)    [facility]    IV Infusion Status Set-up Complete/Auth obtained (P)    [facility]                    Contact Info       Daniel Reyes DPM   Specialty: Podiatry, Wound Care    [address]  [address]  [facility] [address]   Phone: [phone]       Next Steps: Follow up on 12/16/2024    Instructions: at 4:00pm; podiatry hospital follow up appointment    [facility]    [address] [place] [id number]   Phone: [phone]       Next Steps: Follow up    Instructions: will provide home health services    [facility]    [address] [place] [id number]       Next Steps: Follow up    Instructions: will provide IV antibiotics, supplies, & teaching    [facility]   Specialty: Infectious Diseases    [address], [address]  [facility] [address]   Phone: [phone]       Next Steps: Follow up in 4 week(s)    Instructions: Patient will be notified of an infectious disease hospital follow up appointment with Ninoska Larson MD   Specialty: Internal Medicine    [address]  [address]  [place] [address]   Phone: [phone]       Next Steps: Follow up on 12/16/2024    Instructions: at 3:00pm; hospital follow up appoitment in the [facility]

## 2024-12-04 NOTE — TELEPHONE ENCOUNTER
I forward this message to Autumn Clark.    ----- Message from Maria Dolores Tan MD sent at 12/4/2024 10:47 AM CST -----  Regarding: RE: HFU  He was seen by [facility] at [place] and they are supposed to manage their own opat. He needs to follow at [place] [facility] with carla aguillon.  ----- Message -----  From: Sarah Monaco  Sent: 12/4/2024   9:38 AM CST  To: Maria Dolores Tan MD  Subject: FW: HFU                                          When will you like to f/u with pt?  ----- Message -----  From: Autumn Clark  Sent: 12/3/2024   5:11 PM CST  To: [facility] Staff  Subject: HFU                                              Patient discharged from [facility] and a HFU was requested with Infectious Disease by DC provider.  Please schedule and message me back so DC can relay appointment information to patient. Patient is established.    Patient discharged on IV ABX.  Discharge requested a 4 week follow up.    DX: Chronic osteomyelitis of left foot    Kristy Mcmanus  Physician Referral Specialist/Discharge

## 2024-12-04 NOTE — DISCHARGE SUMMARY
[facility] Surg  Podiatry   Discharge Summary      Patient Name: Indio Ryder Jr.  MRN: [id number]  Admission Date: 11/27/2024  Hospital Length of Stay: 6 days  Discharge Date: 12/04/2024  Attending Physician: Daniel Reyes DPM Discharging Provider: Daniel Reyes DPM  Primary Care Physician: Lorena Thakur MD    HPI: The patient is a 54 y.o. male with past medical history (listed below)     The patient elected to proceed with the procedure(s) below. Please see H&P and/or clinic progress note(s) for full details.     Procedure(s) (LRB):  IRRIGATION AND DEBRIDEMENT of osteomyelitic bone (Left) foot  OPEN BONE BIOPSY LEFT FOOT CALCANEUS  3.   IMPLANTATION OF ANTIBIOTIC ELUDING CEMENT AND BIOACTIVE GLASS LEFT FOOT  4.   FUSION OF TALONAVICULAR JOINT LEFT FOOT WITH PERCUTANEOUS PINNING  5.   EXOSTECTOMY LEFT ANKLE  6.   ACHILLES TENDON LENGTHENING LEFT  7.   APPLICATION, MULTIPLE PLANE EXTERNAL FIXATION SYSTEM LEFT LOWER EXTREMITY    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    Right foot    Colon adenocarcinoma 04/12/2022    Diabetic ketoacidosis without coma associated with type 2 diabetes mellitus 05/30/2017    Diabetic ulcer of right foot associated with type 2 diabetes mellitus 06/03/2015    Disorder of kidney and ureter     Essential hypertension 06/06/2013    Group B streptococcal infection 01/13/2017    Mixed hyperlipidemia 08/12/2014    Septic arthritis of left foot 04/28/2021    Shoulder impingement 08/12/2014    Subacute osteomyelitis of right foot 01/12/2017    Streptococcus agalactiae, Actinomyces odontolyticus    Traumatic amputation of fifth toe of right foot 07/02/2015    Type 2 diabetes mellitus with diabetic neuropathy, with long-term current use of insulin 05/03/2016       Hospital Course (synopsis of major diagnoses, care, treatment, and services provided during the course of the hospital stay): Admitted post op to receive IV antibiotics consisting of  Vancomycin and Cefepime. ID was consulted and de-escalated to Cefepime. Intra-op bone c&s NGTD and intra-op pathology + osteomyelitis. Patient was discharged home with home health receiving IV Cefepime 2 g q 12h x 4 weeks with flagyl 500 mg po tid x 4 weeks. Patient is NWB left foot. Uneventful postoperative  course. Hospital Medicine medically managed patient while inpatient.       Consults:   Consults (From admission, onward)          Status Ordering Provider     Inpatient consult to Infectious Diseases  Once        Provider:  (Not yet assigned)    Completed DANIEL CARSON     IP consult case management/social work  Once        Provider:  (Not yet assigned)    Completed DANIEL CARSON            Significant Diagnostic Studies: N/A    Pending Diagnostic Studies:       None            Final Active Diagnoses:    Diagnosis Date Noted POA    PRINCIPAL PROBLEM:  Chronic osteomyelitis of left foot [M86.672] 10/20/2023 Yes    Acquired varus deformity of foot, left [M21.172] 11/27/2024 Yes    Exostosis of bone of ankle [M89.8X7] 11/27/2024 Yes    Equinus contracture of left ankle [M24.572] 11/27/2024 Yes    Subluxation of tarsal joint of foot, left, sequela [S93.312S] 11/27/2024 Not Applicable    Type 2 diabetes mellitus with Charcot joint arthropathy [E11.610] 11/27/2024 Yes    Subacute osteomyelitis of left foot [M86.272] 03/11/2020 Yes    Type 2 diabetes mellitus with hyperglycemia, with long-term current use of insulin [E11.65, Z79.4] 05/03/2016 Not Applicable    Mixed hyperlipidemia [E78.2] 08/12/2014 Yes    Essential hypertension [I10] 06/06/2013 Yes      Problems Resolved During this Admission:       Discharged Condition: good    Disposition: Home-Health Care [facility] [place] [facility]    Follow Up:   Follow-up Information       Daniel Carson, DPM Follow up on 12/16/2024.    Specialties: Podiatry, Wound Care  Why: at 4:00pm; podiatry hospital follow up appointment  Contact information:  [address]  [facility] [address]  [phone]               [facility] Follow up.    Why: will provide home health services  Contact information:  [address]  [facility] [address] [phone]             [facility] Follow up.    Why: will provide IV antibiotics, supplies, & teaching  Contact information:  [address]  [facility] [address]             [facility] Follow up in 4 week(s).    Specialty: Infectious Diseases  Why: Patient will be notified of an infectious disease hospital follow up appointment with Dr Norton  Contact information:  [address], [address]  [facility] [address] [phone]  Additional information:  Please park in Lot C or D and use [address] fu. Take Medical Office Bldg elevators.             Ninoska Navarrete MD Follow up on 12/16/2024.    Specialty: Internal Medicine  Why: at 3:00pm; hospital follow up appoitment in the [facility]  Contact information:  [address]  [address]  [facility] [address]  [phone]                             Patient Instructions:      WHEELCHAIR FOR HOME USE     Order Specific Question Answer Comments   Hours in W/C per day: 3    Type of Wheelchair: Standard    Size(Width): 20"    Leg Support: Elevating leg rests    Lap Belt: Velcro    Accessories: Anti-tippers    Cushion: Basic    Reclining Back No    Height: 6' 1" (1.854 m)    Weight: 98.1 kg (216 lb 4.3 oz)    Does patient have medical equipment at home? glucometer knee scooter; pt has an ankle brace but it doesn't fit / knee scooter; pt has an ankle brace but it doesn't fit   Does patient have medical equipment at home? other (see comments)    Length of need (1-99 months): 5    Please check all that apply: The patient has a cast, brace or muscloskeletal condition which prevents 90 degree flexion of the knee.    Please check all that apply: The patient has significant " "edema of the lower extremities that requires an elevating leg rest.    Please check all that apply: The patient requires the use of a w/c for activities of daily living within the Home.      Ambulatory referral/consult to Home Health   Standing Status: Future   Referral Priority: Routine Referral Type: Home Health Care   Referral Reason: Specialty Services Required   Requested Specialty: Home Health Services   Number of Visits Requested: 1     Diet diabetic     Keep surgical extremity elevated     Notify your health care provider if you experience any of the following:  temperature >100.4     Notify your health care provider if you experience any of the following:  persistent nausea and vomiting or diarrhea     Notify your health care provider if you experience any of the following:  severe uncontrolled pain     Notify your health care provider if you experience any of the following:  redness, tenderness, or signs of infection (pain, swelling, redness, odor or green/yellow discharge around incision site)     Leave dressing on - Keep it clean, dry, and intact until clinic visit     Weight bearing restrictions (specify):   Order Comments: Non-weightbearing left foot       Medications:  Reconciled Home Medications:      Medication List        START taking these medications      ceFEPIme 1 gram injection  Inject 2 g into the vein every 8 (eight) hours.     metroNIDAZOLE 500 MG tablet  Commonly known as: FLAGYL  Take 1 tablet (500 mg total) by mouth every 8 (eight) hours.            CONTINUE taking these medications      BD ULTRA-FINE SASCHA PEN NEEDLE 32 gauge x 5/32" Ndle  Generic drug: pen needle, diabetic  Use 4 times a day     JARDIANCE 25 mg tablet  Generic drug: empagliflozin  Take 1 tablet (25 mg total) by mouth once daily.     NovoLOG Flexpen U-100 Insulin 100 unit/mL (3 mL) Inpn pen  Generic drug: insulin aspart U-100  Inject if sugar is > 180 up to 4x a day , 180-230+2, 231-280+4, 281-330+6, 331-380+8, >380+10. " Max daily 40 units     pantoprazole 40 MG tablet  Commonly known as: PROTONIX  Take 1 tablet (40 mg total) by mouth once daily.     TOUJEO MAX U-300 SOLOSTAR 300 unit/mL (3 mL) insulin pen  Generic drug: insulin glargine U-300 conc  Inject 26 to 32 units under the skin at night     TRUE METRIX GLUCOSE METER Misc  Generic drug: blood-glucose meter  Use as instructed     TRUE METRIX GLUCOSE TEST STRIP Strp  Generic drug: blood sugar diagnostic  use to test blood sugar 4 times daily     TRUEPLUS LANCETS 33 gauge Misc  Generic drug: lancets  1 lancet  by Misc.(Non-Drug; Combo Route) route 4 (four) times daily.            STOP taking these medications      ciprofloxacin HCl 750 MG tablet  Commonly known as: CIPRO              Time spent on the discharge of patient: 30 minutes    Daniel Reyes DPM  Podiatry   [facility] - Med Surg

## 2024-12-05 ENCOUNTER — PATIENT OUTREACH (OUTPATIENT)
Dept: ADMINISTRATIVE | Facility: CLINIC | Age: 54
End: 2024-12-05
Payer: MEDICARE

## 2024-12-05 PROCEDURE — G0180 MD CERTIFICATION HHA PATIENT: HCPCS | Mod: ,,, | Performed by: PODIATRIST

## 2024-12-05 NOTE — PROGRESS NOTES
C3 nurse attempted to contact Indio Ryder Jr.  for a TCC post hospital discharge follow up call. No answer. Left voicemail with callback information. The patient has a scheduled HOSFU appointment with Ninoska Navarrete MD ON 12/16/2024 @ 3 PM.

## 2024-12-06 NOTE — PROGRESS NOTES
2nd attempt made to reach patient for TCC call.  Left voicemail please call [phone] leave first name, last name and  and I will return your call.

## 2024-12-06 NOTE — PROGRESS NOTES
3rd attempt made to reach patient for TCC call. Left voicemail please call [phone] leave first name, last name, and  and I will return your call.

## 2024-12-09 ENCOUNTER — LAB VISIT (OUTPATIENT)
Dept: LAB | Facility: HOSPITAL | Age: 54
End: 2024-12-09
Attending: INTERNAL MEDICINE
Payer: MEDICARE

## 2024-12-09 DIAGNOSIS — M21.172 VARUS DEFORMITY, NOT ELSEWHERE CLASSIFIED, LEFT ANKLE: ICD-10-CM

## 2024-12-09 DIAGNOSIS — M21.172 VARUS DEFORMITY, NOT ELSEWHERE CLASSIFIED, LEFT ANKLE: Primary | ICD-10-CM

## 2024-12-09 LAB
ALBUMIN SERPL BCP-MCNC: 2.7 G/DL (ref 3.5–5.2)
ALP SERPL-CCNC: 108 U/L (ref 40–150)
ALT SERPL W/O P-5'-P-CCNC: 12 U/L (ref 10–44)
ANION GAP SERPL CALC-SCNC: 9 MMOL/L (ref 8–16)
AST SERPL-CCNC: 20 U/L (ref 10–40)
BASOPHILS # BLD AUTO: 0.05 K/UL (ref 0–0.2)
BASOPHILS NFR BLD: 1 % (ref 0–1.9)
BILIRUB SERPL-MCNC: 0.2 MG/DL (ref 0.1–1)
BUN SERPL-MCNC: 17 MG/DL (ref 6–20)
CALCIUM SERPL-MCNC: 9 MG/DL (ref 8.7–10.5)
CHLORIDE SERPL-SCNC: 106 MMOL/L (ref 95–110)
CO2 SERPL-SCNC: 27 MMOL/L (ref 23–29)
CREAT SERPL-MCNC: 0.8 MG/DL (ref 0.5–1.4)
CRP SERPL-MCNC: 1.9 MG/L (ref 0–8.2)
DIFFERENTIAL METHOD BLD: ABNORMAL
EOSINOPHIL # BLD AUTO: 0.2 K/UL (ref 0–0.5)
EOSINOPHIL NFR BLD: 3.3 % (ref 0–8)
ERYTHROCYTE [DISTWIDTH] IN BLOOD BY AUTOMATED COUNT: 14.2 % (ref 11.5–14.5)
EST. GFR  (NO RACE VARIABLE): >60 ML/MIN/1.73 M^2
GLUCOSE SERPL-MCNC: 146 MG/DL (ref 70–110)
HCT VFR BLD AUTO: 31.3 % (ref 40–54)
HGB BLD-MCNC: 9.5 G/DL (ref 14–18)
IMM GRANULOCYTES # BLD AUTO: 0.01 K/UL (ref 0–0.04)
IMM GRANULOCYTES NFR BLD AUTO: 0.2 % (ref 0–0.5)
LYMPHOCYTES # BLD AUTO: 2.1 K/UL (ref 1–4.8)
LYMPHOCYTES NFR BLD: 43.8 % (ref 18–48)
MCH RBC QN AUTO: 27.3 PG (ref 27–31)
MCHC RBC AUTO-ENTMCNC: 30.4 G/DL (ref 32–36)
MCV RBC AUTO: 90 FL (ref 82–98)
MONOCYTES # BLD AUTO: 0.4 K/UL (ref 0.3–1)
MONOCYTES NFR BLD: 8.3 % (ref 4–15)
NEUTROPHILS # BLD AUTO: 2.1 K/UL (ref 1.8–7.7)
NEUTROPHILS NFR BLD: 43.4 % (ref 38–73)
NRBC BLD-RTO: 0 /100 WBC
PLATELET # BLD AUTO: 502 K/UL (ref 150–450)
PMV BLD AUTO: 9.7 FL (ref 9.2–12.9)
POTASSIUM SERPL-SCNC: 3.6 MMOL/L (ref 3.5–5.1)
PROT SERPL-MCNC: 7.1 G/DL (ref 6–8.4)
RBC # BLD AUTO: 3.48 M/UL (ref 4.6–6.2)
SODIUM SERPL-SCNC: 142 MMOL/L (ref 136–145)
WBC # BLD AUTO: 4.82 K/UL (ref 3.9–12.7)

## 2024-12-09 PROCEDURE — 85652 RBC SED RATE AUTOMATED: CPT | Mod: HCNC | Performed by: INTERNAL MEDICINE

## 2024-12-09 PROCEDURE — 85025 COMPLETE CBC W/AUTO DIFF WBC: CPT | Mod: HCNC,PO | Performed by: INTERNAL MEDICINE

## 2024-12-09 PROCEDURE — 80053 COMPREHEN METABOLIC PANEL: CPT | Mod: HCNC,PO | Performed by: INTERNAL MEDICINE

## 2024-12-09 PROCEDURE — 86140 C-REACTIVE PROTEIN: CPT | Mod: HCNC,PO | Performed by: INTERNAL MEDICINE

## 2024-12-10 LAB — ERYTHROCYTE [SEDIMENTATION RATE] IN BLOOD BY PHOTOMETRIC METHOD: >120 MM/HR (ref 0–23)

## 2024-12-13 ENCOUNTER — TELEPHONE (OUTPATIENT)
Dept: CARDIOLOGY | Facility: CLINIC | Age: 54
End: 2024-12-13
Payer: MEDICARE

## 2024-12-16 ENCOUNTER — LAB VISIT (OUTPATIENT)
Dept: LAB | Facility: HOSPITAL | Age: 54
End: 2024-12-16
Attending: INTERNAL MEDICINE
Payer: MEDICARE

## 2024-12-16 ENCOUNTER — OFFICE VISIT (OUTPATIENT)
Dept: PRIMARY CARE CLINIC | Facility: CLINIC | Age: 54
End: 2024-12-16
Payer: MEDICARE

## 2024-12-16 ENCOUNTER — HOSPITAL ENCOUNTER (OUTPATIENT)
Dept: RADIOLOGY | Facility: HOSPITAL | Age: 54
Discharge: HOME | End: 2024-12-16
Attending: PODIATRIST
Payer: MEDICARE

## 2024-12-16 ENCOUNTER — OFFICE VISIT (OUTPATIENT)
Dept: PODIATRY | Facility: CLINIC | Age: 54
End: 2024-12-16
Payer: MEDICARE

## 2024-12-16 VITALS
BODY MASS INDEX: 28.66 KG/M2 | DIASTOLIC BLOOD PRESSURE: 78 MMHG | WEIGHT: 216.25 LBS | HEART RATE: 90 BPM | SYSTOLIC BLOOD PRESSURE: 112 MMHG | HEIGHT: 73 IN

## 2024-12-16 VITALS — HEART RATE: 75 BPM | TEMPERATURE: 98 F | HEIGHT: 73 IN | OXYGEN SATURATION: 99 % | BODY MASS INDEX: 28.53 KG/M2

## 2024-12-16 DIAGNOSIS — E11.69 DIABETIC FOOT ULCER WITH OSTEOMYELITIS: ICD-10-CM

## 2024-12-16 DIAGNOSIS — E11.610 TYPE 2 DIABETES MELLITUS WITH CHARCOT JOINT ARTHROPATHY: ICD-10-CM

## 2024-12-16 DIAGNOSIS — M86.272 SUBACUTE OSTEOMYELITIS OF LEFT FOOT: Primary | ICD-10-CM

## 2024-12-16 DIAGNOSIS — Z98.890 POSTOPERATIVE STATE: Primary | ICD-10-CM

## 2024-12-16 DIAGNOSIS — L97.526 DIABETIC ULCER OF OTHER PART OF LEFT FOOT ASSOCIATED WITH TYPE 2 DIABETES MELLITUS, WITH BONE INVOLVEMENT WITHOUT EVIDENCE OF NECROSIS: ICD-10-CM

## 2024-12-16 DIAGNOSIS — Z79.4 TYPE 2 DIABETES MELLITUS WITH HYPERGLYCEMIA, WITH LONG-TERM CURRENT USE OF INSULIN: ICD-10-CM

## 2024-12-16 DIAGNOSIS — L97.426 DIABETIC ULCER OF LEFT MIDFOOT ASSOCIATED WITH DIABETES MELLITUS DUE TO UNDERLYING CONDITION, WITH BONE INVOLVEMENT WITHOUT EVIDENCE OF NECROSIS: ICD-10-CM

## 2024-12-16 DIAGNOSIS — M86.9 DIABETIC FOOT ULCER WITH OSTEOMYELITIS: ICD-10-CM

## 2024-12-16 DIAGNOSIS — Z98.890 POSTOPERATIVE STATE: ICD-10-CM

## 2024-12-16 DIAGNOSIS — E11.621 DIABETIC FOOT ULCER WITH OSTEOMYELITIS: ICD-10-CM

## 2024-12-16 DIAGNOSIS — E11.621 DIABETIC ULCER OF OTHER PART OF LEFT FOOT ASSOCIATED WITH TYPE 2 DIABETES MELLITUS, WITH BONE INVOLVEMENT WITHOUT EVIDENCE OF NECROSIS: ICD-10-CM

## 2024-12-16 DIAGNOSIS — E11.65 TYPE 2 DIABETES MELLITUS WITH HYPERGLYCEMIA, WITH LONG-TERM CURRENT USE OF INSULIN: ICD-10-CM

## 2024-12-16 DIAGNOSIS — L97.509 DIABETIC FOOT ULCER WITH OSTEOMYELITIS: ICD-10-CM

## 2024-12-16 DIAGNOSIS — E08.621 DIABETIC ULCER OF LEFT MIDFOOT ASSOCIATED WITH DIABETES MELLITUS DUE TO UNDERLYING CONDITION, WITH BONE INVOLVEMENT WITHOUT EVIDENCE OF NECROSIS: ICD-10-CM

## 2024-12-16 DIAGNOSIS — M86.672 CHRONIC OSTEOMYELITIS OF HINDFOOT, LEFT: ICD-10-CM

## 2024-12-16 PROBLEM — M00.9 SEPTIC ARTHRITIS OF LEFT FOOT: Status: RESOLVED | Noted: 2021-04-28 | Resolved: 2024-12-16

## 2024-12-16 PROBLEM — E87.6 HYPOKALEMIA: Status: RESOLVED | Noted: 2017-05-30 | Resolved: 2024-12-16

## 2024-12-16 PROBLEM — L02.414 ABSCESS OF LEFT ARM: Status: RESOLVED | Noted: 2024-07-02 | Resolved: 2024-12-16

## 2024-12-16 LAB
ALBUMIN SERPL BCP-MCNC: 3.1 G/DL (ref 3.5–5.2)
ALP SERPL-CCNC: 104 U/L (ref 40–150)
ALT SERPL W/O P-5'-P-CCNC: 18 U/L (ref 10–44)
ANION GAP SERPL CALC-SCNC: 12 MMOL/L (ref 8–16)
AST SERPL-CCNC: 26 U/L (ref 10–40)
BASOPHILS # BLD AUTO: 0.05 K/UL (ref 0–0.2)
BASOPHILS NFR BLD: 1.1 % (ref 0–1.9)
BILIRUB SERPL-MCNC: 0.3 MG/DL (ref 0.1–1)
BUN SERPL-MCNC: 14 MG/DL (ref 6–20)
CALCIUM SERPL-MCNC: 9.7 MG/DL (ref 8.7–10.5)
CHLORIDE SERPL-SCNC: 106 MMOL/L (ref 95–110)
CO2 SERPL-SCNC: 24 MMOL/L (ref 23–29)
CREAT SERPL-MCNC: 0.9 MG/DL (ref 0.5–1.4)
CRP SERPL-MCNC: 1.5 MG/L (ref 0–8.2)
DIFFERENTIAL METHOD BLD: ABNORMAL
EOSINOPHIL # BLD AUTO: 0.2 K/UL (ref 0–0.5)
EOSINOPHIL NFR BLD: 3.5 % (ref 0–8)
ERYTHROCYTE [DISTWIDTH] IN BLOOD BY AUTOMATED COUNT: 14.1 % (ref 11.5–14.5)
EST. GFR  (NO RACE VARIABLE): >60 ML/MIN/1.73 M^2
GLUCOSE SERPL-MCNC: 161 MG/DL (ref 70–110)
HCT VFR BLD AUTO: 34.2 % (ref 40–54)
HGB BLD-MCNC: 10.3 G/DL (ref 14–18)
IMM GRANULOCYTES # BLD AUTO: 0.04 K/UL (ref 0–0.04)
IMM GRANULOCYTES NFR BLD AUTO: 0.9 % (ref 0–0.5)
LYMPHOCYTES # BLD AUTO: 1.9 K/UL (ref 1–4.8)
LYMPHOCYTES NFR BLD: 41.4 % (ref 18–48)
MCH RBC QN AUTO: 26.4 PG (ref 27–31)
MCHC RBC AUTO-ENTMCNC: 30.1 G/DL (ref 32–36)
MCV RBC AUTO: 88 FL (ref 82–98)
MONOCYTES # BLD AUTO: 0.6 K/UL (ref 0.3–1)
MONOCYTES NFR BLD: 11.9 % (ref 4–15)
NEUTROPHILS # BLD AUTO: 1.9 K/UL (ref 1.8–7.7)
NEUTROPHILS NFR BLD: 41.2 % (ref 38–73)
NRBC BLD-RTO: 0 /100 WBC
PLATELET # BLD AUTO: 297 K/UL (ref 150–450)
PMV BLD AUTO: 10.8 FL (ref 9.2–12.9)
POTASSIUM SERPL-SCNC: 3.9 MMOL/L (ref 3.5–5.1)
PROT SERPL-MCNC: 8 G/DL (ref 6–8.4)
RBC # BLD AUTO: 3.9 M/UL (ref 4.6–6.2)
SODIUM SERPL-SCNC: 142 MMOL/L (ref 136–145)
WBC # BLD AUTO: 4.61 K/UL (ref 3.9–12.7)

## 2024-12-16 PROCEDURE — 99999 PR PBB SHADOW E&M-EST. PATIENT-LVL V: CPT | Mod: PBBFAC,HCNC,, | Performed by: PODIATRIST

## 2024-12-16 PROCEDURE — 3074F SYST BP LT 130 MM HG: CPT | Mod: HCNC,CPTII,S$GLB, | Performed by: PODIATRIST

## 2024-12-16 PROCEDURE — 73590 X-RAY EXAM OF LOWER LEG: CPT | Mod: TC,HCNC,FY,LT

## 2024-12-16 PROCEDURE — 3046F HEMOGLOBIN A1C LEVEL >9.0%: CPT | Mod: HCNC,CPTII,S$GLB, | Performed by: PODIATRIST

## 2024-12-16 PROCEDURE — 99024 POSTOP FOLLOW-UP VISIT: CPT | Mod: HCNC,S$GLB,, | Performed by: PODIATRIST

## 2024-12-16 PROCEDURE — 1159F MED LIST DOCD IN RCRD: CPT | Mod: HCNC,CPTII,S$GLB, | Performed by: INTERNAL MEDICINE

## 2024-12-16 PROCEDURE — 80053 COMPREHEN METABOLIC PANEL: CPT | Mod: HCNC,PO | Performed by: INTERNAL MEDICINE

## 2024-12-16 PROCEDURE — 73630 X-RAY EXAM OF FOOT: CPT | Mod: TC,HCNC,FY,LT

## 2024-12-16 PROCEDURE — 3046F HEMOGLOBIN A1C LEVEL >9.0%: CPT | Mod: HCNC,CPTII,S$GLB, | Performed by: INTERNAL MEDICINE

## 2024-12-16 PROCEDURE — 73590 X-RAY EXAM OF LOWER LEG: CPT | Mod: 26,HCNC,LT, | Performed by: RADIOLOGY

## 2024-12-16 PROCEDURE — 1160F RVW MEDS BY RX/DR IN RCRD: CPT | Mod: HCNC,CPTII,S$GLB, | Performed by: PODIATRIST

## 2024-12-16 PROCEDURE — 1159F MED LIST DOCD IN RCRD: CPT | Mod: HCNC,CPTII,S$GLB, | Performed by: PODIATRIST

## 2024-12-16 PROCEDURE — 3008F BODY MASS INDEX DOCD: CPT | Mod: HCNC,CPTII,S$GLB, | Performed by: INTERNAL MEDICINE

## 2024-12-16 PROCEDURE — 73630 X-RAY EXAM OF FOOT: CPT | Mod: 26,HCNC,LT, | Performed by: RADIOLOGY

## 2024-12-16 PROCEDURE — 99214 OFFICE O/P EST MOD 30 MIN: CPT | Mod: HCNC,S$GLB,, | Performed by: INTERNAL MEDICINE

## 2024-12-16 PROCEDURE — 3078F DIAST BP <80 MM HG: CPT | Mod: HCNC,CPTII,S$GLB, | Performed by: PODIATRIST

## 2024-12-16 PROCEDURE — 99999 PR PBB SHADOW E&M-EST. PATIENT-LVL III: CPT | Mod: PBBFAC,HCNC,, | Performed by: INTERNAL MEDICINE

## 2024-12-16 PROCEDURE — 86140 C-REACTIVE PROTEIN: CPT | Mod: HCNC,PO | Performed by: INTERNAL MEDICINE

## 2024-12-16 PROCEDURE — 85025 COMPLETE CBC W/AUTO DIFF WBC: CPT | Mod: HCNC,PO | Performed by: INTERNAL MEDICINE

## 2024-12-16 PROCEDURE — 1160F RVW MEDS BY RX/DR IN RCRD: CPT | Mod: HCNC,CPTII,S$GLB, | Performed by: INTERNAL MEDICINE

## 2024-12-16 PROCEDURE — 1111F DSCHRG MED/CURRENT MED MERGE: CPT | Mod: HCNC,CPTII,S$GLB, | Performed by: INTERNAL MEDICINE

## 2024-12-16 NOTE — PROCEDURES
Wound Debridement    Date/Time: 12/16/2024 4:00 PM    Performed by: Daniel Reyes DPM  Authorized by: Daniel Reyes DPM    Consent Done?:  Yes (Verbal)    Preparation: Patient was prepped and draped with clean technique    Local anesthesia used?: No      Wound Details:    Location:  Left foot    Location:  Left Plantar    Type of Debridement:  Excisional       Length (cm):  2.5       Width (cm):  0.8       Depth (cm):  0.5       Area (sq cm):  2       Percent Debrided (%):  100       Total Area Debrided (sq cm):  2    Depth of debridement:  Subcutaneous tissue    Tissue debrided:  Subcutaneous    Devitalized tissue debrided:  Slough, Necrotic/Eschar and Callus    Instruments:  Curette  Bleeding:  Minimal  Hemostasis Achieved: Yes  Method Used:  Pressure  Patient tolerance:  Patient tolerated the procedure well with no immediate complications  1st Wound Pain Assessment: 0      Assisted by Joshua Rudd DPM PGY 2

## 2024-12-16 NOTE — PROGRESS NOTES
Subjective:     Patient ID: Indio Ryder Jr. is a 54 y.o. male.    Chief Complaint: Diabetic Foot Ulcer, surgical consult    Indio is a 54 y.o. male who presents to the clinic for evaluation and treatment of high risk feet. 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), Colon adenocarcinoma (04/12/2022), Diabetic ketoacidosis without coma associated with type 2 diabetes mellitus (05/30/2017), Diabetic ulcer of right foot associated with type 2 diabetes mellitus (06/03/2015), Disorder of kidney and ureter, Essential hypertension (06/06/2013), Group B streptococcal infection (01/13/2017), Mixed hyperlipidemia (08/12/2014), Septic arthritis of left foot (04/28/2021), Shoulder impingement (08/12/2014), Subacute osteomyelitis of right foot (01/12/2017), Traumatic amputation of fifth toe of right foot (07/02/2015), and Type 2 diabetes mellitus with diabetic neuropathy, with long-term current use of insulin (05/03/2016). The patient's chief complaint is foot ulcer along the left plantar midfoot.  Patient had been seeing Dr. Peterson in the [facility] on a biweekly basis for management of his left foot ulceration. He presents today for surgical consultation, evaluation for rearfoot fusion in regards to his left foot and ankle.  His last A1c was obtained in April, greater than 14.0.  Patient has had extensive pedal surgical history, last having had a debridement per Dr. Peterson in July of 2023. Patient had received a custom ankle brace in January and states that he subsequently had developed an ulceration as a result of it; previous to this, the ulceration of his left midfoot had healed over.     09/16/2024:  Follow-up from updated weight-bearing left foot and ankle x-ray imaging and lab work.  Seen per  in wound clinic on weekly basis.  Ambulating with cam boot.  States he is in the process of adjusting his medications due to uncontrolled hyperglycemia.    11/11/2024:  Returns for  re-evaluation of progressive varus deformity to left foot and chronic ulceration followed weekly at Wound Care.  States his hemoglobin A1c improved and he is monitoring his continuous glucose monitor noting that his daily blood sugar values improved a lot since the last A1c was taken on 10/16/2024 had shown value of 11%.  Denies any pain.  Ambulating with tall Cam boot.  States he change his diet.    11/22/2024:  Returns to discuss CT results.  Seen weekly in Wound Care.    12/16/2024:  Returns for wound check.  Three weeks postop.  Followed by [facility].  Tolerating IV cefepime well.  Seen per .  Nonweightbearing left lower extremity.    PCP: Lorena Thakur MD      Current shoe gear:  Affected Foot: Surgical boot, rolling knee scooter     Unaffected Foot: Extra depth shoes    Hemoglobin A1C   Date Value Ref Range Status   10/16/2024 11.0 (H) 4.0 - 5.6 % Final     Comment:     ADA Screening Guidelines:  5.7-6.4%  Consistent with prediabetes  >or=6.5%  Consistent with diabetes    High levels of fetal hemoglobin interfere with the HbA1C  assay. Heterozygous hemoglobin variants (HbS, HgC, etc)do  not significantly interfere with this assay.   However, presence of multiple variants may affect accuracy.     08/08/2024 >14.0 (H) 4.0 - 5.6 % Final     Comment:     ADA Screening Guidelines:  5.7-6.4%  Consistent with prediabetes  >or=6.5%  Consistent with diabetes    High levels of fetal hemoglobin interfere with the HbA1C  assay. Heterozygous hemoglobin variants (HbS, HgC, etc)do  not significantly interfere with this assay.   However, presence of multiple variants may affect accuracy.     04/20/2024 >14.0 (H) 4.0 - 5.6 % Final     Comment:     ADA Screening Guidelines:  5.7-6.4%  Consistent with prediabetes  >or=6.5%  Consistent with diabetes    High levels of fetal hemoglobin interfere with the HbA1C  assay. Heterozygous hemoglobin variants (HbS, HgC, etc)do  not significantly interfere with this  assay.   However, presence of multiple variants may affect accuracy.         Review of Systems   Constitutional: Negative for chills and fever.   HENT:  Negative for congestion.    Eyes: Negative.    Cardiovascular:  Negative for chest pain.   Respiratory:  Negative for cough and shortness of breath.    Skin:  Positive for dry skin.   Neurological:  Positive for numbness.   Psychiatric/Behavioral:  Negative for altered mental status.         Objective:     Physical Exam  Vitals reviewed.   Constitutional:       General: He is not in acute distress.     Appearance: He is not ill-appearing.   Cardiovascular:      Comments: Capillary refill greater than 3 seconds to left digits.     Pedal pulses heard only through Doppler bilateral:    Triphasic PT, monophasic DP left   Monophasic PT, monophasic DP right  Musculoskeletal:         General: No swelling or tenderness.      Right lower leg: No edema.      Left lower leg: No edema.      Comments: Amputated left 5th ray.  Moderate flexible varus rotation to the left foot noted.  Left ankle range motion difficult to assess but noted to be 0° dorsiflexion to neutral with the knee extended at the ankle.  No pain on palpation or range motion to the foot and ankle bilateral.  Crepitus with attempted left ankle range motion noted.   Feet:      Right foot:      Protective Sensation: 10 sites tested.  0 sites sensed.      Skin integrity: Ulcer present.      Left foot:      Protective Sensation: 10 sites tested.  0 sites sensed.   Skin:     General: Skin is warm and dry.      Findings: No ecchymosis or erythema.      Nails: There is no clubbing.      Comments: Ulceration plantar left midfoot with mostly hyperkeratotic/eschar tissue in the base.  Post debridement wound site measures 2.5 x 0.8 x 0.5 cm.  Does not probe, track or undermine.  No signs infection noted.   Neurological:      Mental Status: He is alert and oriented to person, place, and time.      Cranial Nerves: No cranial  nerve deficit.      Sensory: Sensory deficit present.      Comments: 0/10 [place] monofilament bilateral   Psychiatric:         Mood and Affect: Mood normal.                               Assessment:      Encounter Diagnoses   Name Primary?    Postoperative state Yes    Type 2 diabetes mellitus with Charcot joint arthropathy     Diabetic ulcer of left midfoot associated with diabetes mellitus due to underlying condition, with bone involvement without evidence of necrosis      Plan:     Indio was seen today for wound check.    Diagnoses and all orders for this visit:    Postoperative state  -     X-Ray Foot Complete Left; Future  -     X-Ray Tibia Fibula 2 View Left; Future  -     X-Ray Foot Complete Left; Future  -     X-Ray Tibia Fibula 2 View Left; Future  -     SUBSEQUENT HOME HEALTH ORDERS    Type 2 diabetes mellitus with Charcot joint arthropathy  -     X-Ray Foot Complete Left; Future  -     X-Ray Tibia Fibula 2 View Left; Future  -     X-Ray Foot Complete Left; Future  -     X-Ray Tibia Fibula 2 View Left; Future  -     SUBSEQUENT HOME HEALTH ORDERS    Diabetic ulcer of left midfoot associated with diabetes mellitus due to underlying condition, with bone involvement without evidence of necrosis  -     Wound Debridement  -     SUBSEQUENT HOME HEALTH ORDERS      I counseled the patient on his conditions, their implications and medical management.    Staple postoperative course.  No broken or infected pin sites noted.    Follow-up left foot and left tib-fib x-ray ordered for today and subsequent 1 at next visit within 4 weeks.    Patient may transition to partial weight-bearing left lower extremity for transfers.    Continue elevation at rest.    Continue IV cefepime 2 g q.12h by PICC line as prescribed.    Lab work completed today was reviewed noting normal CRP and no kidney dysfunction.  Albumin slightly low therefore we discussed adequate intake.    Excisional wound debridement for wound site  plantar left midfoot per attached note.  Wound site lightly packed with saline moistened [place] followed by Hydrofera ready blue.  Hydrofera ready blue cut into small squares with central slit applied around each pin site.  Com form was wrapped from the forefoot to proximal leg followed by 2 inch Ace wrap.    RTC p.r.n. as discussed     Assisted by Joshua Rudd DPM PGY 2    A portion of this note was generated by voice recognition software and may contain spelling and grammar errors.

## 2024-12-16 NOTE — PROGRESS NOTES
[facility]   New Visit Progress Note   Recent Hospital Discharge     PRESENTING HISTORY     Chief Complaint/Reason for Admission:  Follow up Hospital Discharge   PCP: Lorena Thakur MD    History of Present Illness:  Mr. Indio Ryder Jr. is a 54 y.o. male who was recently admitted to the hospital.    [facility]  Podiatry   Discharge Summary        Patient Name: Indio Ryder Jr.  MRN: [id number]  Admission Date: 11/27/2024  Hospital Length of Stay: 6 days  Discharge Date: 12/04/2024  Attending Physician: Daniel Reyes DPM Discharging Provider: Daniel Reyes DPM  Primary Care Physician: Lorena Thakur MD  __________________________________________________________________    Today:  [facility] to [facility] for [facility] follow up.  Recently hospitalized for management of left foot osteomyelitis.  Admitted to Podiatry service with consultation to ID and Hospital Medicine teams.  Patient underwent surgical I&D, bone biopsy, ABX implantation, and external fixator placement to left foot on 11/27/24.  Admitted post operatively for IV ABX- Vancomycin and Cefepime initiated.  Intra operative bone culture NGTD.  Bone pathology consistent with osteomyelitis.  PICC line placed.   Patient discharged to home to complete 4 week course IV Cefepime 2 G Q 8 hours and oral Flagyl 500 mg TID.   Weekly labs to be reviewed by [facility] ID team.   [facility] and [facility] arranged.     Patient accompanied today by family.  Brought some medication bottles for review but not all.  In wheelchair.  External fixator device to left lower extremity.  Reports home CBG readings in good range.   Labs drawn this AM by home health team- results pending.     Review of Systems  General ROS: negative for chills, fever or weight loss  Psychological ROS: negative for hallucination, depression or suicidal ideation  Ophthalmic ROS: negative for blurry vision,  photophobia or eye pain  ENT ROS: negative for epistaxis, sore throat or rhinorrhea  Respiratory ROS: no cough, shortness of breath, or wheezing  Cardiovascular ROS: no chest pain or dyspnea on exertion  Gastrointestinal ROS: no abdominal pain, change in bowel habits, or black/ bloody stools  Genito-Urinary ROS: no dysuria, trouble voiding, or hematuria  Musculoskeletal ROS: negative for gait disturbance or muscular weakness  Neurological ROS: no syncope or seizures; no ataxia  Dermatological ROS: negative for pruritis, rash and jaundice        PAST HISTORY:     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    Right foot    Colon adenocarcinoma 04/12/2022    Diabetic ketoacidosis without coma associated with type 2 diabetes mellitus 05/30/2017    Diabetic ulcer of right foot associated with type 2 diabetes mellitus 06/03/2015    Disorder of kidney and ureter     Essential hypertension 06/06/2013    Group B streptococcal infection 01/13/2017    Mixed hyperlipidemia 08/12/2014    Septic arthritis of left foot 04/28/2021    Shoulder impingement 08/12/2014    Subacute osteomyelitis of right foot 01/12/2017    Streptococcus agalactiae, Actinomyces odontolyticus    Traumatic amputation of fifth toe of right foot 07/02/2015    Type 2 diabetes mellitus with diabetic neuropathy, with long-term current use of insulin 05/03/2016       Past Surgical History:   Procedure Laterality Date    APPLICATION, EXTERNAL FIXATION SYSTEM, MULTIPLANAR, WITH IMAGING GUIDANCE Left 11/27/2024    Procedure: APPLICATION, EXTERNAL FIXATION SYSTEM, MULTIPLANAR, WITH IMAGING GUIDANCE;  Surgeon: Daniel Reyes DPM;  Location: [facility];  Service: Podiatry;  Laterality: Left;  c-arm, TL static frame with carbon fiber foot plate([employer], Jamie)    COLONOSCOPY Right 1/19/2022    Procedure: COLONOSCOPY;  Surgeon: Tj Haile MD;  [facility] ([address] [place] [address]);  Service: Endoscopy;  Laterality: Right;  previous order  un-usable/poor prep 1/18/22  RAPID COVID test arrival 12:20  instructions handed to pt-     COLONOSCOPY N/A 3/21/2022    Procedure: COLONOSCOPY;  Surgeon: Sheng Haque MD;  Location: [facility] ENDO (2ND FLR);  Service: Endoscopy;  Laterality: N/A;  Colonoscopy with AES for hepatix flexure polyp removal, 2nd floor  Pt is fully vaccinated-DS  Pt prescribed Plavix by Dr. Stahl in Jan. 2022, however pt states that he never started taking it-DS  3/16/22-Instructions sent via portal-DS    COLONOSCOPY N/A 9/13/2023    Procedure: COLONOSCOPY;  Surgeon: Erik Stout MD;  Location: [facility] ENDO (4TH FLR);  Service: Colon and Rectal;  Laterality: N/A;  Referred by Dr. Stout, timur, Meds, portal -ml    DEBRIDEMENT OF FOOT Left 7/14/2019    Procedure: DEBRIDEMENT, FOOT, with left 5th ray amputation;  Surgeon: Sis Hickman DPM;  Location: [facility] OR 2ND FLR;  Service: Podiatry;  Laterality: Left;    DEBRIDEMENT OF FOOT Left 7/17/2019    Procedure: DEBRIDEMENT, FOOT with leftt 5th ray partial amputation with Neox Graft;  Surgeon: Mai Burrell DPM;  Location: [facility] OR 2ND FLR;  Service: Podiatry;  Laterality: Left;  t    DEBRIDEMENT OF FOOT Bilateral 4/29/2021    Procedure: DEBRIDEMENT, FOOT;  Surgeon: Mai Burrell DPM;  Location: [facility] OR 1ST FLR;  Service: Podiatry;  Laterality: Bilateral;  Graft application    DEBRIDEMENT OF FOOT Left 7/31/2023    Procedure: DEBRIDEMENT, FOOT;  Surgeon: Marivel Peterson DPM;  Location: [facility] OR 2ND FLR;  Service: Podiatry;  Laterality: Left;    IRRIGATION AND DEBRIDEMENT Left 11/27/2024    Procedure: IRRIGATION AND DEBRIDEMENT;  Surgeon: Daniel Reyes DPM;  Location: [facility] OR;  Service: Podiatry;  Laterality: Left;  Bioactive/Stimulan with tobramycin and vancomycin    TOE AMPUTATION Right 06/05/2015    TOE AMPUTATION Right 01/19/2017    XI ROBOTIC COLECTOMY, RIGHT N/A 4/25/2022    Procedure: XI ROBOTIC COLECTOMY, RIGHT (lithotomy, eras low);  Surgeon: Erik  YANNICK Stout MD;  [facility];  Service: Colon and Rectal;  Laterality: N/A;  Paruch to [place]    XI ROBOTIC COLECTOMY, RIGHT  4/25/2022    Procedure: ;  Surgeon: Erik Stout MD;  [facility];  Service: Colon and Rectal;;       Family History   Adopted: Yes   Problem Relation Name Age of Onset    Hypertension Mother      Cancer Mother          breast    Diabetes Mother      Hypertension Father      Cataracts Father      Heart disease Father          mi    Diabetes Sister Michelle     No Known Problems Brother      Heart disease Sister Manuela         MI    No Known Problems Sister      Hypertension Maternal Aunt      No Known Problems Maternal Uncle      No Known Problems Paternal Aunt      No Known Problems Paternal Uncle      No Known Problems Maternal Grandmother      No Known Problems Maternal Grandfather      No Known Problems Paternal Grandmother      No Known Problems Paternal Grandfather      Amblyopia Neg Hx      Blindness Neg Hx      Glaucoma Neg Hx      Macular degeneration Neg Hx      Retinal detachment Neg Hx      Strabismus Neg Hx      Stroke Neg Hx      Thyroid disease Neg Hx           MEDICATIONS & ALLERGIES:     Current Outpatient Medications on File Prior to Visit   Medication Sig Dispense Refill    blood sugar diagnostic Strp use to test blood sugar 4 times daily 100 each 0    blood-glucose meter Misc Use as instructed 1 each 1    ceFEPIme 1 gram injection Inject 2 g into the vein every 8 (eight) hours. 168 g 0    empagliflozin (JARDIANCE) 25 mg tablet Take 1 tablet (25 mg total) by mouth once daily. 30 tablet 8    insulin aspart U-100 (NOVOLOG) 100 unit/mL (3 mL) InPn pen Inject if sugar is > 180 up to 4x a day , 180-230+2, 231-280+4, 281-330+6, 331-380+8, >380+10. Max daily 40 units 15 mL 2    insulin glargine U-300 conc (TOUJEO MAX U-300 SOLOSTAR) 300 unit/mL (3 mL) insulin pen Inject 26 to 32 units under the skin at night 2 Pen 6    lancets 33 gauge Misc 1  "lancet  by Misc.(Non-Drug; Combo Route) route 4 (four) times daily. 100 each 0    metroNIDAZOLE (FLAGYL) 500 MG tablet Take 1 tablet (500 mg total) by mouth every 8 (eight) hours. 84 tablet 0    pantoprazole (PROTONIX) 40 MG tablet Take 1 tablet (40 mg total) by mouth once daily. 90 tablet 0    pen needle, diabetic (BD SASCHA 2ND GEN PEN NEEDLE) 32 gauge x 5/32" Ndle Use 4 times a day 400 each 3     No current facility-administered medications on file prior to visit.        Review of patient's allergies indicates:  No Known Allergies    OBJECTIVE:     Vital Signs:  Pulse 75   Temp 98.1 °F (36.7 °C) (Oral)   Ht 6' 1" (1.854 m)   SpO2 99%   BMI 28.53 kg/m²   Wt Readings from Last 3 Encounters:   11/27/24 1758 98.1 kg (216 lb 4.3 oz)   11/14/24 1433 89.2 kg (196 lb 10.4 oz)   11/11/24 1415 97.5 kg (214 lb 15.2 oz)     Body mass index is 28.53 kg/m².        Physical Exam:  Pulse 75   Temp 98.1 °F (36.7 °C) (Oral)   Ht 6' 1" (1.854 m)   SpO2 99%   BMI 28.53 kg/m²   General appearance: alert, cooperative, no distress  Constitutional:Oriented to person, place, and time  + appears well-developed and well-nourished.   HEENT: Normocephalic, atraumatic, neck symmetrical, no nasal discharge   Eyes: conjunctivae/corneas clear, PERRL, EOM's intact  Lungs: clear to auscultation bilaterally, no dullness to percussion bilaterally  Heart: regular rate and rhythm without rub; no displacement of the PMI   Abdomen: soft, non-tender; bowel sounds normoactive; no organomegaly  Extremities: extremities symmetric; no clubbing, cyanosis, or edema  + PICC line right arm  + left leg external fixator   Integument: Skin color, texture, turgor normal; no rashes; hair distrubution normal  Neurologic: Alert and oriented X 3, normal strength, normal coordination and gait  Psychiatric: no pressured speech; normal affect; no evidence of impaired cognition     Laboratory  Lab Results   Component Value Date    WBC 4.82 12/09/2024    HGB 9.5 (L) " 12/09/2024    HCT 31.3 (L) 12/09/2024    MCV 90 12/09/2024     (H) 12/09/2024     BMP  Lab Results   Component Value Date     12/09/2024    K 3.6 12/09/2024     12/09/2024    CO2 27 12/09/2024    BUN 17 12/09/2024    CREATININE 0.8 12/09/2024    CALCIUM 9.0 12/09/2024    ANIONGAP 9 12/09/2024    EGFRNORACEVR >60.0 12/09/2024     Lab Results   Component Value Date    ALT 12 12/09/2024    AST 20 12/09/2024    ALKPHOS 108 12/09/2024    BILITOT 0.2 12/09/2024     Lab Results   Component Value Date    INR 1.0 10/16/2024    INR 1.0 07/24/2023    INR 1.1 01/16/2017     Lab Results   Component Value Date    HGBA1C 11.0 (H) 10/16/2024       TRANSITION OF CARE:     [facility] Contact Note: 3 Unsuccessful attempts  12/6/24 @ 2:21 pm  12/6/24 @ 10:51 am  12/5/24 @ 3:36 pm     Family and/or Caretaker present at visit?  Yes.  Diagnostic tests reviewed/disposition: I have reviewed all completed as well as pending diagnostic tests at the time of discharge.  Disease/illness education: Yes  Home health/community services discussion/referrals: Patient has home health established at [facility]  .   Establishment or re-establishment of referral orders for community resources: No other necessary community resources.   Discussion with other health care providers: No discussion with other health care providers necessary.     ASSESSMENT & PLAN:     Subacute osteomyelitis of left foot  Diabetic ulcer of other part of left foot associated with type 2 diabetes mellitus, with bone involvement without evidence of necrosis  - recent hospitalization as above  - no organism identified  - complete 4 week course Cefepime/Flagyl- estimated end date 12/31/24  - see Podiatry today  - see ID 2/10/25      Type 2 diabetes mellitus with hyperglycemia, with long-term current use of insulin  - HgBA1C 11.0  - return to Endocrine team 1/2/25   - continue Jardiance and basal/prandial insulin as directed     I will see  "patient again in hospital follow up clinic (telemedicine) 1/3/25.     Instructions for the patient:      Scheduled Follow-up :  Future Appointments   Date Time Provider Department Center   12/16/2024  4:00 PM Daniel Reyes DPM [facility] PODIAT [facility]   12/16/2024  5:50 PM IB SPECIMEN LABORATORY [facility] SPECLAB [place]   12/17/2024  9:30 AM NUCLEAR CAMERA, [facility] NOM NUCCARD [address]   12/17/2024  9:35 AM NUCLEAR CAMERA, [facility] NOMH NUCCARD [facility]   12/17/2024  9:40 AM NUCLEAR CAMERA, [facility] NOM NUCCARD [facility]   1/2/2025  2:00 PM Gigi Clay, APRN, FNP [facility] IM [facility] PCW   1/3/2025  9:30 AM Ninoska Navarrete MD [facility] IMPRI [facility]   1/7/2025  2:30 PM aMrgaux Mercer MD [facility] ENDOCR [place]   1/14/2025  4:00 PM Daniel Reyes DPM [facility] PODIAT [facility]   2/10/2025 10:00 AM Chandan Alaniz MD [facility] L[facility] ID [facility]   2/11/2025  2:45 PM Daniel Reyes, CLEM [facility] PODIAT [facility]       Post Visit Medication List:     Medication List            Accurate as of December 16, 2024  3:27 PM. If you have any questions, ask your nurse or doctor.                CONTINUE taking these medications      BD ULTRA-FINE SASCHA PEN NEEDLE 32 gauge x 5/32" Ndle  Generic drug: pen needle, diabetic  Use 4 times a day     ceFEPIme 1 gram injection  Inject 2 g into the vein every 8 (eight) hours.     JARDIANCE 25 mg tablet  Generic drug: empagliflozin  Take 1 tablet (25 mg total) by mouth once daily.     metroNIDAZOLE 500 MG tablet  Commonly known as: FLAGYL  Take 1 tablet (500 mg total) by mouth every 8 (eight) hours.     NovoLOG Flexpen U-100 Insulin 100 unit/mL (3 mL) Inpn pen  Generic drug: insulin aspart U-100  Inject if sugar is > 180 up to 4x a day , 180-230+2, 231-280+4, 281-330+6, 331-380+8, >380+10. Max daily 40 units     pantoprazole 40 MG tablet  Commonly known as: PROTONIX  Take 1 tablet (40 mg total) by mouth once daily.     TOUJEO MAX U-300 SOLOSTAR 300 unit/mL (3 mL) insulin pen  Generic " drug: insulin glargine U-300 conc  Inject 26 to 32 units under the skin at night     TRUE METRIX GLUCOSE METER Misc  Generic drug: blood-glucose meter  Use as instructed     TRUE METRIX GLUCOSE TEST STRIP Strp  Generic drug: blood sugar diagnostic  use to test blood sugar 4 times daily     TRUEPLUS LANCETS 33 gauge Misc  Generic drug: lancets  1 lancet  by Misc.(Non-Drug; Combo Route) route 4 (four) times daily.              Signing Physician:  Ninoska Navarrete MD

## 2024-12-17 ENCOUNTER — HOSPITAL ENCOUNTER (OUTPATIENT)
Dept: CARDIOLOGY | Facility: HOSPITAL | Age: 54
Discharge: HOME | End: 2024-12-17
Attending: INTERNAL MEDICINE
Payer: MEDICARE

## 2024-12-17 ENCOUNTER — PATIENT MESSAGE (OUTPATIENT)
Dept: PODIATRY | Facility: CLINIC | Age: 54
End: 2024-12-17
Payer: MEDICARE

## 2024-12-17 DIAGNOSIS — Z01.818 PREOP EXAMINATION: ICD-10-CM

## 2024-12-17 DIAGNOSIS — R93.1 DECREASED CARDIAC EJECTION FRACTION: Primary | ICD-10-CM

## 2024-12-17 DIAGNOSIS — R94.31 ABNORMAL EKG: ICD-10-CM

## 2024-12-17 LAB
CV PHARM DOSE: 0.4 MG
CV STRESS BASE HR: 83 BPM
DIASTOLIC BLOOD PRESSURE: 78 MMHG
EJECTION FRACTION- HIGH: 59 %
END DIASTOLIC INDEX-HIGH: 155 ML/M2
END DIASTOLIC INDEX-LOW: 91 ML/M2
END SYSTOLIC INDEX-HIGH: 78 ML/M2
END SYSTOLIC INDEX-LOW: 40 ML/M2
NUC REST DIASTOLIC VOLUME INDEX: 102
NUC REST EJECTION FRACTION: 36
NUC REST SYSTOLIC VOLUME INDEX: 65
NUC STRESS DIASTOLIC VOLUME INDEX: 106
NUC STRESS EJECTION FRACTION: 35 %
NUC STRESS SYSTOLIC VOLUME INDEX: 69
OHS CV CPX 1 MINUTE RECOVERY HEART RATE: 98 BPM
OHS CV CPX 85 PERCENT MAX PREDICTED HEART RATE MALE: 139
OHS CV CPX MAX PREDICTED HEART RATE: 164
OHS CV CPX PATIENT IS FEMALE: 0
OHS CV CPX PATIENT IS MALE: 1
OHS CV CPX PEAK DIASTOLIC BLOOD PRESSURE: 59 MMHG
OHS CV CPX PEAK HEAR RATE: 99 BPM
OHS CV CPX PEAK RATE PRESSURE PRODUCT: NORMAL
OHS CV CPX PEAK SYSTOLIC BLOOD PRESSURE: 110 MMHG
OHS CV CPX PERCENT MAX PREDICTED HEART RATE ACHIEVED: 60
OHS CV CPX RATE PRESSURE PRODUCT PRESENTING: NORMAL
OHS CV INITIAL DOSE: 7.3 MCG/KG/MIN
OHS CV PEAK DOSE: 21 MCG/KG/MIN
RETIRED EF AND QEF - SEE NOTES: 47 %
SYSTOLIC BLOOD PRESSURE: 126 MMHG

## 2024-12-17 PROCEDURE — 63600175 PHARM REV CODE 636 W HCPCS: Mod: HCNC | Performed by: INTERNAL MEDICINE

## 2024-12-17 PROCEDURE — 93016 CV STRESS TEST SUPVJ ONLY: CPT | Mod: HCNC,,, | Performed by: INTERNAL MEDICINE

## 2024-12-17 PROCEDURE — 93018 CV STRESS TEST I&R ONLY: CPT | Mod: HCNC,,, | Performed by: INTERNAL MEDICINE

## 2024-12-17 PROCEDURE — A9502 TC99M TETROFOSMIN: HCPCS | Mod: HCNC | Performed by: INTERNAL MEDICINE

## 2024-12-17 PROCEDURE — 78452 HT MUSCLE IMAGE SPECT MULT: CPT | Mod: 26,HCNC,, | Performed by: INTERNAL MEDICINE

## 2024-12-17 PROCEDURE — 93017 CV STRESS TEST TRACING ONLY: CPT | Mod: HCNC

## 2024-12-17 RX ORDER — REGADENOSON 0.08 MG/ML
0.4 INJECTION, SOLUTION INTRAVENOUS
Status: COMPLETED | OUTPATIENT
Start: 2024-12-17 | End: 2024-12-17

## 2024-12-17 RX ADMIN — TETROFOSMIN 7.3 MILLICURIE: 0.23 INJECTION, POWDER, LYOPHILIZED, FOR SOLUTION INTRAVENOUS at 09:12

## 2024-12-17 RX ADMIN — REGADENOSON 0.4 MG: 0.08 INJECTION, SOLUTION INTRAVENOUS at 10:12

## 2024-12-17 RX ADMIN — TETROFOSMIN 21 MILLICURIE: 0.23 INJECTION, POWDER, LYOPHILIZED, FOR SOLUTION INTRAVENOUS at 10:12

## 2024-12-18 ENCOUNTER — TELEPHONE (OUTPATIENT)
Dept: INTERNAL MEDICINE | Facility: CLINIC | Age: 54
End: 2024-12-18
Payer: MEDICARE

## 2024-12-18 NOTE — TELEPHONE ENCOUNTER
----- Message from Lane Jim sent at 12/18/2024  8:14 AM CST -----    ----- Message -----  From: Yariel Troncoso MD  Sent: 12/17/2024   6:19 PM CST  To: [facility]div[facility] Staff    Schedule cardiology appointment.    Your stress test came back negative, but the overall function of your heart has declined in the past two years. I will refer you to cardiology for further evaluation.

## 2024-12-23 ENCOUNTER — LAB VISIT (OUTPATIENT)
Dept: LAB | Facility: HOSPITAL | Age: 54
End: 2024-12-23
Attending: INTERNAL MEDICINE
Payer: MEDICARE

## 2024-12-23 DIAGNOSIS — L97.524 ULCER OF TOE OF LEFT FOOT, WITH NECROSIS OF BONE: ICD-10-CM

## 2024-12-23 DIAGNOSIS — M86.9 DIABETIC FOOT ULCER WITH OSTEOMYELITIS: ICD-10-CM

## 2024-12-23 DIAGNOSIS — L97.509 DIABETIC FOOT ULCER WITH OSTEOMYELITIS: Primary | ICD-10-CM

## 2024-12-23 DIAGNOSIS — E11.69 DIABETIC FOOT ULCER WITH OSTEOMYELITIS: Primary | ICD-10-CM

## 2024-12-23 DIAGNOSIS — M86.9 DIABETIC FOOT ULCER WITH OSTEOMYELITIS: Primary | ICD-10-CM

## 2024-12-23 DIAGNOSIS — M86.672 CHRONIC OSTEOMYELITIS OF HINDFOOT, LEFT: ICD-10-CM

## 2024-12-23 DIAGNOSIS — L97.509 DIABETIC FOOT ULCER WITH OSTEOMYELITIS: ICD-10-CM

## 2024-12-23 DIAGNOSIS — E11.69 DIABETIC FOOT ULCER WITH OSTEOMYELITIS: ICD-10-CM

## 2024-12-23 DIAGNOSIS — E11.621 DIABETIC FOOT ULCER WITH OSTEOMYELITIS: ICD-10-CM

## 2024-12-23 DIAGNOSIS — E11.621 DIABETIC FOOT ULCER WITH OSTEOMYELITIS: Primary | ICD-10-CM

## 2024-12-23 LAB
ALBUMIN SERPL BCP-MCNC: 2.9 G/DL (ref 3.5–5.2)
ALP SERPL-CCNC: 100 U/L (ref 40–150)
ALT SERPL W/O P-5'-P-CCNC: 17 U/L (ref 10–44)
ANION GAP SERPL CALC-SCNC: 10 MMOL/L (ref 8–16)
AST SERPL-CCNC: 17 U/L (ref 10–40)
BASOPHILS # BLD AUTO: 0.05 K/UL (ref 0–0.2)
BASOPHILS NFR BLD: 1 % (ref 0–1.9)
BILIRUB SERPL-MCNC: 0.3 MG/DL (ref 0.1–1)
BUN SERPL-MCNC: 18 MG/DL (ref 6–20)
CALCIUM SERPL-MCNC: 9.3 MG/DL (ref 8.7–10.5)
CHLORIDE SERPL-SCNC: 106 MMOL/L (ref 95–110)
CO2 SERPL-SCNC: 26 MMOL/L (ref 23–29)
CREAT SERPL-MCNC: 0.9 MG/DL (ref 0.5–1.4)
CRP SERPL-MCNC: 1.1 MG/L (ref 0–8.2)
DIFFERENTIAL METHOD BLD: ABNORMAL
EOSINOPHIL # BLD AUTO: 0.2 K/UL (ref 0–0.5)
EOSINOPHIL NFR BLD: 3.4 % (ref 0–8)
ERYTHROCYTE [DISTWIDTH] IN BLOOD BY AUTOMATED COUNT: 14.6 % (ref 11.5–14.5)
EST. GFR  (NO RACE VARIABLE): >60 ML/MIN/1.73 M^2
GLUCOSE SERPL-MCNC: 103 MG/DL (ref 70–110)
HCT VFR BLD AUTO: 32.6 % (ref 40–54)
HGB BLD-MCNC: 10 G/DL (ref 14–18)
IMM GRANULOCYTES # BLD AUTO: 0.01 K/UL (ref 0–0.04)
IMM GRANULOCYTES NFR BLD AUTO: 0.2 % (ref 0–0.5)
LYMPHOCYTES # BLD AUTO: 1.8 K/UL (ref 1–4.8)
LYMPHOCYTES NFR BLD: 34 % (ref 18–48)
MCH RBC QN AUTO: 26.4 PG (ref 27–31)
MCHC RBC AUTO-ENTMCNC: 30.7 G/DL (ref 32–36)
MCV RBC AUTO: 86 FL (ref 82–98)
MONOCYTES # BLD AUTO: 0.5 K/UL (ref 0.3–1)
MONOCYTES NFR BLD: 10.3 % (ref 4–15)
NEUTROPHILS # BLD AUTO: 2.7 K/UL (ref 1.8–7.7)
NEUTROPHILS NFR BLD: 51.1 % (ref 38–73)
NRBC BLD-RTO: 0 /100 WBC
PLATELET # BLD AUTO: 260 K/UL (ref 150–450)
PMV BLD AUTO: 11 FL (ref 9.2–12.9)
POTASSIUM SERPL-SCNC: 4.1 MMOL/L (ref 3.5–5.1)
PROT SERPL-MCNC: 7.3 G/DL (ref 6–8.4)
RBC # BLD AUTO: 3.79 M/UL (ref 4.6–6.2)
SODIUM SERPL-SCNC: 142 MMOL/L (ref 136–145)
WBC # BLD AUTO: 5.24 K/UL (ref 3.9–12.7)

## 2024-12-23 PROCEDURE — 80053 COMPREHEN METABOLIC PANEL: CPT | Mod: HCNC,PO | Performed by: INTERNAL MEDICINE

## 2024-12-23 PROCEDURE — 85025 COMPLETE CBC W/AUTO DIFF WBC: CPT | Mod: HCNC,PO | Performed by: INTERNAL MEDICINE

## 2024-12-23 PROCEDURE — 86140 C-REACTIVE PROTEIN: CPT | Mod: HCNC,PO | Performed by: INTERNAL MEDICINE

## 2024-12-27 ENCOUNTER — PATIENT MESSAGE (OUTPATIENT)
Dept: INTERNAL MEDICINE | Facility: CLINIC | Age: 54
End: 2024-12-27
Payer: MEDICARE

## 2024-12-27 DIAGNOSIS — E11.65 TYPE 2 DIABETES MELLITUS WITH HYPERGLYCEMIA, WITH LONG-TERM CURRENT USE OF INSULIN: Primary | ICD-10-CM

## 2024-12-27 DIAGNOSIS — Z79.4 TYPE 2 DIABETES MELLITUS WITH HYPERGLYCEMIA, WITH LONG-TERM CURRENT USE OF INSULIN: Primary | ICD-10-CM

## 2024-12-30 ENCOUNTER — LAB VISIT (OUTPATIENT)
Dept: LAB | Facility: HOSPITAL | Age: 54
End: 2024-12-30
Attending: INTERNAL MEDICINE
Payer: MEDICARE

## 2024-12-30 DIAGNOSIS — E11.621 DIABETIC FOOT ULCER WITH OSTEOMYELITIS: ICD-10-CM

## 2024-12-30 DIAGNOSIS — M86.672 CHRONIC OSTEOMYELITIS OF HINDFOOT, LEFT: ICD-10-CM

## 2024-12-30 DIAGNOSIS — M86.9 DIABETIC FOOT ULCER WITH OSTEOMYELITIS: ICD-10-CM

## 2024-12-30 DIAGNOSIS — E11.69 DIABETIC FOOT ULCER WITH OSTEOMYELITIS: ICD-10-CM

## 2024-12-30 DIAGNOSIS — L97.524 ULCER OF TOE OF LEFT FOOT, WITH NECROSIS OF BONE: ICD-10-CM

## 2024-12-30 DIAGNOSIS — L97.509 DIABETIC FOOT ULCER WITH OSTEOMYELITIS: ICD-10-CM

## 2024-12-30 LAB
ALBUMIN SERPL BCP-MCNC: 2.9 G/DL (ref 3.5–5.2)
ALP SERPL-CCNC: 102 U/L (ref 40–150)
ALT SERPL W/O P-5'-P-CCNC: 18 U/L (ref 10–44)
ANION GAP SERPL CALC-SCNC: 12 MMOL/L (ref 8–16)
AST SERPL-CCNC: 18 U/L (ref 10–40)
BASOPHILS # BLD AUTO: 0.03 K/UL (ref 0–0.2)
BASOPHILS NFR BLD: 0.8 % (ref 0–1.9)
BILIRUB SERPL-MCNC: 0.3 MG/DL (ref 0.1–1)
BUN SERPL-MCNC: 13 MG/DL (ref 6–20)
CALCIUM SERPL-MCNC: 9.4 MG/DL (ref 8.7–10.5)
CHLORIDE SERPL-SCNC: 105 MMOL/L (ref 95–110)
CO2 SERPL-SCNC: 27 MMOL/L (ref 23–29)
CREAT SERPL-MCNC: 0.8 MG/DL (ref 0.5–1.4)
CRP SERPL-MCNC: 1.8 MG/L (ref 0–8.2)
DIFFERENTIAL METHOD BLD: ABNORMAL
EOSINOPHIL # BLD AUTO: 0.2 K/UL (ref 0–0.5)
EOSINOPHIL NFR BLD: 4.1 % (ref 0–8)
ERYTHROCYTE [DISTWIDTH] IN BLOOD BY AUTOMATED COUNT: 14.7 % (ref 11.5–14.5)
EST. GFR  (NO RACE VARIABLE): >60 ML/MIN/1.73 M^2
GLUCOSE SERPL-MCNC: 62 MG/DL (ref 70–110)
HCT VFR BLD AUTO: 34.1 % (ref 40–54)
HGB BLD-MCNC: 10.3 G/DL (ref 14–18)
IMM GRANULOCYTES # BLD AUTO: 0.01 K/UL (ref 0–0.04)
IMM GRANULOCYTES NFR BLD AUTO: 0.3 % (ref 0–0.5)
LYMPHOCYTES # BLD AUTO: 1.6 K/UL (ref 1–4.8)
LYMPHOCYTES NFR BLD: 39.6 % (ref 18–48)
MCH RBC QN AUTO: 26.5 PG (ref 27–31)
MCHC RBC AUTO-ENTMCNC: 30.2 G/DL (ref 32–36)
MCV RBC AUTO: 88 FL (ref 82–98)
MONOCYTES # BLD AUTO: 0.5 K/UL (ref 0.3–1)
MONOCYTES NFR BLD: 13.7 % (ref 4–15)
NEUTROPHILS # BLD AUTO: 1.6 K/UL (ref 1.8–7.7)
NEUTROPHILS NFR BLD: 41.5 % (ref 38–73)
NRBC BLD-RTO: 0 /100 WBC
PLATELET # BLD AUTO: 231 K/UL (ref 150–450)
PMV BLD AUTO: 11.2 FL (ref 9.2–12.9)
POTASSIUM SERPL-SCNC: 3.6 MMOL/L (ref 3.5–5.1)
PROT SERPL-MCNC: 7.3 G/DL (ref 6–8.4)
RBC # BLD AUTO: 3.89 M/UL (ref 4.6–6.2)
SODIUM SERPL-SCNC: 144 MMOL/L (ref 136–145)
WBC # BLD AUTO: 3.94 K/UL (ref 3.9–12.7)

## 2024-12-30 PROCEDURE — 80053 COMPREHEN METABOLIC PANEL: CPT | Mod: HCNC,PO | Performed by: INTERNAL MEDICINE

## 2024-12-30 PROCEDURE — 85025 COMPLETE CBC W/AUTO DIFF WBC: CPT | Mod: HCNC,PO | Performed by: INTERNAL MEDICINE

## 2024-12-30 PROCEDURE — 85652 RBC SED RATE AUTOMATED: CPT | Mod: HCNC | Performed by: INTERNAL MEDICINE

## 2024-12-30 PROCEDURE — 86140 C-REACTIVE PROTEIN: CPT | Mod: HCNC,PO | Performed by: INTERNAL MEDICINE

## 2024-12-31 LAB — ERYTHROCYTE [SEDIMENTATION RATE] IN BLOOD BY PHOTOMETRIC METHOD: >120 MM/HR (ref 0–23)

## 2025-01-02 ENCOUNTER — OFFICE VISIT (OUTPATIENT)
Dept: INTERNAL MEDICINE | Facility: CLINIC | Age: 55
End: 2025-01-02
Payer: MEDICARE

## 2025-01-02 ENCOUNTER — LAB VISIT (OUTPATIENT)
Dept: LAB | Facility: HOSPITAL | Age: 55
End: 2025-01-02
Payer: MEDICARE

## 2025-01-02 VITALS
SYSTOLIC BLOOD PRESSURE: 122 MMHG | HEIGHT: 73 IN | OXYGEN SATURATION: 99 % | HEART RATE: 96 BPM | DIASTOLIC BLOOD PRESSURE: 82 MMHG | BODY MASS INDEX: 28.53 KG/M2

## 2025-01-02 DIAGNOSIS — M21.6X1 EQUINUS DEFORMITY OF BOTH FEET: ICD-10-CM

## 2025-01-02 DIAGNOSIS — E11.610 TYPE 2 DIABETES MELLITUS WITH CHARCOT JOINT ARTHROPATHY: ICD-10-CM

## 2025-01-02 DIAGNOSIS — Z79.4 TYPE 2 DIABETES MELLITUS WITH HYPERGLYCEMIA, WITH LONG-TERM CURRENT USE OF INSULIN: Primary | ICD-10-CM

## 2025-01-02 DIAGNOSIS — M86.671 CHRONIC OSTEOMYELITIS OF RIGHT FOOT: ICD-10-CM

## 2025-01-02 DIAGNOSIS — Z89.421 H/O AMPUTATION OF LESSER TOE, RIGHT: ICD-10-CM

## 2025-01-02 DIAGNOSIS — L97.426 DIABETIC ULCER OF LEFT MIDFOOT ASSOCIATED WITH DIABETES MELLITUS DUE TO UNDERLYING CONDITION, WITH BONE INVOLVEMENT WITHOUT EVIDENCE OF NECROSIS: ICD-10-CM

## 2025-01-02 DIAGNOSIS — M86.672 CHRONIC OSTEOMYELITIS OF LEFT FOOT: ICD-10-CM

## 2025-01-02 DIAGNOSIS — M21.6X2 EQUINUS DEFORMITY OF BOTH FEET: ICD-10-CM

## 2025-01-02 DIAGNOSIS — C18.9 COLON ADENOCARCINOMA: ICD-10-CM

## 2025-01-02 DIAGNOSIS — E11.65 TYPE 2 DIABETES MELLITUS WITH HYPERGLYCEMIA, WITH LONG-TERM CURRENT USE OF INSULIN: Primary | ICD-10-CM

## 2025-01-02 DIAGNOSIS — E78.2 MIXED HYPERLIPIDEMIA: ICD-10-CM

## 2025-01-02 DIAGNOSIS — E11.3213 TYPE 2 DIABETES MELLITUS WITH BOTH EYES AFFECTED BY MILD NONPROLIFERATIVE RETINOPATHY AND MACULAR EDEMA, WITH LONG-TERM CURRENT USE OF INSULIN: ICD-10-CM

## 2025-01-02 DIAGNOSIS — E11.65 TYPE 2 DIABETES MELLITUS WITH HYPERGLYCEMIA, WITH LONG-TERM CURRENT USE OF INSULIN: ICD-10-CM

## 2025-01-02 DIAGNOSIS — E11.49 TYPE II DIABETES MELLITUS WITH NEUROLOGICAL MANIFESTATIONS: ICD-10-CM

## 2025-01-02 DIAGNOSIS — I10 ESSENTIAL HYPERTENSION: ICD-10-CM

## 2025-01-02 DIAGNOSIS — E08.621 DIABETIC ULCER OF LEFT MIDFOOT ASSOCIATED WITH DIABETES MELLITUS DUE TO UNDERLYING CONDITION, WITH BONE INVOLVEMENT WITHOUT EVIDENCE OF NECROSIS: ICD-10-CM

## 2025-01-02 DIAGNOSIS — Z79.4 TYPE 2 DIABETES MELLITUS WITH BOTH EYES AFFECTED BY MILD NONPROLIFERATIVE RETINOPATHY AND MACULAR EDEMA, WITH LONG-TERM CURRENT USE OF INSULIN: ICD-10-CM

## 2025-01-02 DIAGNOSIS — Z79.4 TYPE 2 DIABETES MELLITUS WITH HYPERGLYCEMIA, WITH LONG-TERM CURRENT USE OF INSULIN: ICD-10-CM

## 2025-01-02 DIAGNOSIS — Z89.422 HX OF AMPUTATION OF LESSER TOE, LEFT: ICD-10-CM

## 2025-01-02 LAB
ESTIMATED AVG GLUCOSE: 169 MG/DL (ref 68–131)
HBA1C MFR BLD: 7.5 % (ref 4–5.6)

## 2025-01-02 PROCEDURE — 83036 HEMOGLOBIN GLYCOSYLATED A1C: CPT | Mod: HCNC | Performed by: NURSE PRACTITIONER

## 2025-01-02 PROCEDURE — 36415 COLL VENOUS BLD VENIPUNCTURE: CPT | Mod: HCNC | Performed by: NURSE PRACTITIONER

## 2025-01-02 PROCEDURE — 99999 PR PBB SHADOW E&M-EST. PATIENT-LVL IV: CPT | Mod: PBBFAC,HCNC,, | Performed by: NURSE PRACTITIONER

## 2025-01-02 NOTE — PATIENT INSTRUCTIONS
A1c urine mac today  A1c lipid in 4 months  Follow up in 4 months w/Irielle  Ophthalmology 2025  Lab Results   Component Value Date    HGBA1C 11.0 (H) 10/16/2024     Goal less than 8%    [email or web address]  Eat fit fili  My"[email or web address]pal fili  [email or web address]    mySugr fili     Toujeo 28-30 units at night   Novolog if sugar level is >180, 180-230+2, etc.-use up to 4 x a day  Jardiance 25 mg daily

## 2025-01-02 NOTE — PROGRESS NOTES
CHIEF COMPLAINT: Type 2 Diabetes     HPI: Mr. Indio Ryder Jr. is a 54 y.o. male who was diagnosed with Type 2 DM > 20 years.  Has h/o foot ulcers, amputations.  Has boot (L), podiatry in 2019, 2020.  Hospitalized 7/12/19-8/2/19.   Hospitalized 3/2020.  +covid19 3/15/20 vent, extubated 3/26/20, osteomyelitis, DKA  H/o hyperbarics     (L) foot- debridement 7/31/23. Closed x 6-8 weeks. Needs to transfer to ankle brace, worried about tension.  Ozemipc- diarrhea, trulicity -diarrhea  Off glp1a   Colon ca stage 1 (R) colectomy 4/25/2022.     Stopped lisinopril r/t hypotension 90/60s-dealing with orthostatic hypotension  Arrived   Now has special cage for (L) leg , subluxation of tarsal joint , HH 2x a week -changes dressing.   Retired, wakes up around 11a.  Lives in [facility].  Cooked meals.     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   Recent dental work, tooth extraction-wisdom.     Lab Results   Component Value Date    HGBA1C 11.0 (H) 10/16/2024     PREVIOUS DIABETES MEDICATIONS TRIED  levemir  novolog  Metformin -ses  Trulicity-ses  Victoza  Ozempic-ses  Metformin xr-ses  jardiance    CURRENT DIABETIC MEDS: toujeo  29 units at night, novolog correction scale 180-230+2 ,etc, jardiance 25 mg daily-now at night ,worried about ses     Pt is monitoring blood glucose readings 4 times a day.  Needs >100 strips per month related to fluctuations with blood glucose reading, a1c trends, and activity level.  See above     Diabetes Management Status    Statin: Taking  ACE/ARB: Taking    Screening or Prevention Patient's value Goal Complete/Controlled?   HgA1C Testing and Control   Lab Results   Component Value Date    HGBA1C 11.0 (H) 10/16/2024      Annually/Less than 8% Yes   Lipid profile : 01/13/2023 Annually Yes   LDL control Lab Results   Component Value Date    LDLCALC 113.6 01/13/2023    Annually/Less than 100 mg/dl  No   Nephropathy screening Lab Results  "  Component Value Date    LABMICR 19.0 07/01/2022     Lab Results   Component Value Date    PROTEINUA Negative 11/25/2024    Annually Yes   Blood pressure BP Readings from Last 1 Encounters:   12/16/24 112/78    Less than 140/90 Yes   Dilated retinal exam : 10/06/2022 Annually Yes   Foot exam   : 10/16/2024 Annually Yes     REVIEW OF SYSTEMS  General: no weakness, fatigue, +weight loss 9#  Eyes: no double or blurred vision, eye pain, or redness  Cardiovascular: no chest pain, palpitations, edema, or murmurs.   Respiratory: no cough or dyspnea.   GI: + heartburn, nausea, +diarrhea; fair appetite.   Skin: no rashes, dryness, itching, or reactions at insulin injection sites.  Neuro: +numbness, tingling, tremors, or vertigo. +amputation, cage/brace--L leg, (R) 5th, great toe   Endocrine: no polyuria, polydipsia, polyphagia, heat or cold intolerance.     Vital Signs  /82 (BP Location: Left arm, Patient Position: Sitting)   Pulse 96   Ht 6' 1" (1.854 m)   SpO2 99%   BMI 28.53 kg/m²     Hemoglobin A1C   Date Value Ref Range Status   10/16/2024 11.0 (H) 4.0 - 5.6 % Final     Comment:     ADA Screening Guidelines:  5.7-6.4%  Consistent with prediabetes  >or=6.5%  Consistent with diabetes    High levels of fetal hemoglobin interfere with the HbA1C  assay. Heterozygous hemoglobin variants (HbS, HgC, etc)do  not significantly interfere with this assay.   However, presence of multiple variants may affect accuracy.     08/08/2024 >14.0 (H) 4.0 - 5.6 % Final     Comment:     ADA Screening Guidelines:  5.7-6.4%  Consistent with prediabetes  >or=6.5%  Consistent with diabetes    High levels of fetal hemoglobin interfere with the HbA1C  assay. Heterozygous hemoglobin variants (HbS, HgC, etc)do  not significantly interfere with this assay.   However, presence of multiple variants may affect accuracy.     04/20/2024 >14.0 (H) 4.0 - 5.6 % Final     Comment:     ADA Screening Guidelines:  5.7-6.4%  Consistent with " prediabetes  >or=6.5%  Consistent with diabetes    High levels of fetal hemoglobin interfere with the HbA1C  assay. Heterozygous hemoglobin variants (HbS, HgC, etc)do  not significantly interfere with this assay.   However, presence of multiple variants may affect accuracy.          Chemistry        Component Value Date/Time     12/30/2024 1505    K 3.6 12/30/2024 1505     12/30/2024 1505    CO2 27 12/30/2024 1505    BUN 13 12/30/2024 1505    CREATININE 0.8 12/30/2024 1505    GLU 62 (L) 12/30/2024 1505        Component Value Date/Time    CALCIUM 9.4 12/30/2024 1505    ALKPHOS 102 12/30/2024 1505    AST 18 12/30/2024 1505    ALT 18 12/30/2024 1505    BILITOT 0.3 12/30/2024 1505    ESTGFRAFRICA >60 07/22/2022 1320    EGFRNONAA >60 07/22/2022 1320           Lab Results   Component Value Date    TSH 1.136 03/01/2012      Lab Results   Component Value Date    CHOL 181 01/13/2023    CHOL 188 07/22/2022    CHOL 100 (L) 07/14/2021     Lab Results   Component Value Date    HDL 49 01/13/2023    HDL 48 07/22/2022    HDL 35 (L) 07/14/2021     Lab Results   Component Value Date    LDLCALC 113.6 01/13/2023    LDLCALC 121.4 07/22/2022    LDLCALC 52.8 (L) 07/14/2021     Lab Results   Component Value Date    TRIG 92 01/13/2023    TRIG 93 07/22/2022    TRIG 61 07/14/2021     Lab Results   Component Value Date    CHOLHDL 27.1 01/13/2023    CHOLHDL 25.5 07/22/2022    CHOLHDL 35.0 07/14/2021       PHYSICAL EXAMINATION  Constitutional: Appears well, no distress  Neck: Supple, trachea midline.   Respiratory: No wheezes, even and unlabored.  Cardiovascular: RRR;  no edema.   Lymph: deferred   Skin: warm and dry; no injection site reactions, no acanthosis nigracans observed.  Neuro:patient alert and cooperative, normal affect; (L) boot, has scooter     Diabetes Foot Exam:   Deferred     Assessment/Plan    1. Type 2 diabetes mellitus with hyperglycemia, with long-term current use of insulin  Hemoglobin A1C today    Hemoglobin A1C  in 4 months   A1c goal less than 8%   Continue regimen above    Discussed dietary habits, stressors  Discussed cequr or omnipod dash/5 -defer for now  Dexcom-defer for now, cost issues       2. Type 2 diabetes mellitus with 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 edema, with long-term current use of insulin  F/u with retinal specialist   stable      3. Type II diabetes mellitus with neurological manifestations  F/u with podiatry  Stable       4. Type 2 diabetes mellitus with Charcot joint arthropathy  F/u with infectious disease, podiatry,        5. Essential hypertension  Microalbumin/Creatinine Ratio, Urine today  On sglt2i, arb/acei      6. Mixed hyperlipidemia  Lipid Panel  Lab Results   Component Value Date    LDLCALC 113.6 01/13/2023     Above goal         7. Chronic osteomyelitis of right foot  See above   stable      8. Hx of amputation of lesser toe, left  F/u with podiatry  stable      9. Chronic osteomyelitis of left foot  Recent sx 11/2024   stable      10. Equinus deformity of both feet  See above      11. H/O amputation of lesser toe, right  See above       12. Colon adenocarcinoma  Be mindful of glp1a, gip/glp1a addition       13. Diabetic ulcer of left midfoot associated with diabetes mellitus due to underlying condition, with bone involvement without evidence of necrosis  See above

## 2025-01-03 ENCOUNTER — OFFICE VISIT (OUTPATIENT)
Dept: PRIMARY CARE CLINIC | Facility: CLINIC | Age: 55
End: 2025-01-03
Payer: MEDICARE

## 2025-01-03 ENCOUNTER — PATIENT MESSAGE (OUTPATIENT)
Dept: INTERNAL MEDICINE | Facility: CLINIC | Age: 55
End: 2025-01-03
Payer: MEDICARE

## 2025-01-03 ENCOUNTER — PATIENT MESSAGE (OUTPATIENT)
Dept: OPHTHALMOLOGY | Facility: CLINIC | Age: 55
End: 2025-01-03
Payer: MEDICARE

## 2025-01-03 DIAGNOSIS — M86.272 SUBACUTE OSTEOMYELITIS OF LEFT FOOT: Primary | ICD-10-CM

## 2025-01-03 DIAGNOSIS — E11.621 DIABETIC ULCER OF LEFT MIDFOOT ASSOCIATED WITH TYPE 2 DIABETES MELLITUS, WITH BONE INVOLVEMENT WITHOUT EVIDENCE OF NECROSIS: ICD-10-CM

## 2025-01-03 DIAGNOSIS — L97.426 DIABETIC ULCER OF LEFT MIDFOOT ASSOCIATED WITH TYPE 2 DIABETES MELLITUS, WITH BONE INVOLVEMENT WITHOUT EVIDENCE OF NECROSIS: ICD-10-CM

## 2025-01-03 DIAGNOSIS — Z79.4 TYPE 2 DIABETES MELLITUS WITH HYPERGLYCEMIA, WITH LONG-TERM CURRENT USE OF INSULIN: ICD-10-CM

## 2025-01-03 DIAGNOSIS — E11.65 TYPE 2 DIABETES MELLITUS WITH HYPERGLYCEMIA, WITH LONG-TERM CURRENT USE OF INSULIN: ICD-10-CM

## 2025-01-03 PROBLEM — L97.512 DIABETIC ULCER OF RIGHT FOOT ASSOCIATED WITH TYPE 2 DIABETES MELLITUS, WITH FAT LAYER EXPOSED: Status: RESOLVED | Noted: 2022-04-26 | Resolved: 2025-01-03

## 2025-01-03 PROBLEM — L97.529 DIABETIC ULCER OF LEFT FOOT: Status: RESOLVED | Noted: 2021-04-28 | Resolved: 2025-01-03

## 2025-01-03 RX ORDER — METRONIDAZOLE 500 MG/1
500 TABLET ORAL EVERY 8 HOURS
Qty: 18 TABLET | Refills: 0 | Status: SHIPPED | OUTPATIENT
Start: 2025-01-03 | End: 2025-01-09

## 2025-01-03 RX ORDER — CEFEPIME HYDROCHLORIDE 1 G/1
2 INJECTION, POWDER, FOR SOLUTION INTRAMUSCULAR; INTRAVENOUS EVERY 8 HOURS
Start: 2025-01-03 | End: 2025-01-09

## 2025-01-03 NOTE — PROGRESS NOTES
Subjective:       Patient ID: Indio Ryder Jr. is a 54 y.o. male.    Chief Complaint: Hospital Follow Up    HPI:  Hospitalized 11/27/24- 12/4/24 for management of left foot osteomyelitis.  Admitted to Podiatry service with consultation to ID and Hospital Medicine teams.  Patient underwent surgical I&D, bone biopsy, ABX implantation, and external fixator placement to left foot on 11/27/24.  Admitted post operatively for IV ABX- Vancomycin and Cefepime initiated.  Intra operative bone culture NGTD.  Bone pathology consistent with osteomyelitis.  PICC line placed.   Patient discharged to home to complete 4 week course IV Cefepime 2 G Q 8 hours and oral Flagyl 500 mg TID.   Weekly labs to be reviewed by [facility] ID team.   [facility] and [facility] arranged.     Seen by Endocrine team 1/2/25.  Notes reviewed.   Previous intolerance to GLP1- diarrhea.   Instructions as follows-   Toujeo 28-30 units at night   Novolog if sugar level is >180, 180-230+2, etc.-use up to 4 x a day  Jardiance 25 mg daily     Patient seen today via telemedicine.  Compliant with ABX therapy.  New end date 1/9/25- clarified with pharmacy and [facility] medcard updated.     Review of Systems   Constitutional:  Negative for chills, fever and weight loss.   Respiratory:  Negative for shortness of breath.    Cardiovascular:  Negative for chest pain and leg swelling.   Musculoskeletal:  Negative for falls.   Neurological:  Negative for focal weakness.   Psychiatric/Behavioral:  The patient is not nervous/anxious.            Objective:      Vital Signs:  There were no vitals filed for this visit.  Wt Readings from Last 3 Encounters:   12/16/24 1529 98.1 kg (216 lb 4.3 oz)   11/27/24 1758 98.1 kg (216 lb 4.3 oz)   11/14/24 1433 89.2 kg (196 lb 10.4 oz)     There is no height or weight on file to calculate BMI.   SpO2 Readings from Last 1 Encounters:   01/02/25 99%       Physical Exam  Constitutional:        General: He is not in acute distress.     Appearance: He is not ill-appearing.   Neurological:      Mental Status: He is oriented to person, place, and time.   Psychiatric:         Mood and Affect: Mood normal.             Assessment:       1. Subacute osteomyelitis of left foot    2. Diabetic ulcer of left midfoot associated with type 2 diabetes mellitus, with bone involvement without evidence of necrosis    3. Type 2 diabetes mellitus with hyperglycemia, with long-term current use of insulin        Plan:       Diagnoses and all orders for this visit:       Subacute osteomyelitis of left foot  Diabetic ulcer of other part of left foot associated with type 2 diabetes mellitus, with bone involvement without evidence of necrosis  - recent hospitalization as above  - no organism identified  - complete course Cefepime/Flagyl- estimated end date now 1/9/25- EPIC updated   - return to Podiatry 1/14/25   - see ID 2/10/25   -     ceFEPIme 1 gram injection; Inject 2 g into the vein every 8 (eight) hours. for 6 days    -     metroNIDAZOLE (FLAGYL) 500 MG tablet; Take 1 tablet (500 mg total) by mouth every 8 (eight) hours. for 6 days      Type 2 diabetes mellitus with hyperglycemia, with long-term current use of insulin  - HgBA1C 7.5, improved from 11   - continue Jardiance and basal/prandial insulin as directed   - return to Endocrine team 5/8/25     Patient will be released from hospital follow up clinic.  Follow up as detailed below.     Scheduled Follow-up :  Future Appointments   Date Time Provider Department Center   1/3/2025  9:30 AM Ninoska Navarrete MD [facility] IMPRI [facility]   1/14/2025  4:00 PM Daniel Reyes DPM [facility] PODIAT [facility]   1/23/2025  2:20 PM Zahira Covarrubias MD [facility] CARDIO [facility]   2/10/2025 10:00 AM Chandan Alaniz MD [facility] ID [facility]   2/11/2025  2:45 PM Daniel Reyes DPM [facility] PODIAT [facility]   5/5/2025  9:30 AM [facility]  "  5/8/2025  9:30 AM Gigi Clay APRN, VAP [facility]       Post Visit Medication List:     Medication List            Accurate as of January 3, 2025 12:04 PM. If you have any questions, ask your nurse or doctor.                CONTINUE taking these medications      BD ULTRA-FINE SASCHA PEN NEEDLE 32 gauge x 5/32" Ndle  Generic drug: pen needle, diabetic  Use 4 times a day     ceFEPIme 1 gram injection  Inject 2 g into the vein every 8 (eight) hours. for 6 days     JARDIANCE 25 mg tablet  Generic drug: empagliflozin  Take 1 tablet (25 mg total) by mouth once daily.     metroNIDAZOLE 500 MG tablet  Commonly known as: FLAGYL  Take 1 tablet (500 mg total) by mouth every 8 (eight) hours. for 6 days     NovoLOG Flexpen U-100 Insulin 100 unit/mL (3 mL) Inpn pen  Generic drug: insulin aspart U-100  Inject if sugar is > 180 up to 4x a day , 180-230+2, 231-280+4, 281-330+6, 331-380+8, >380+10. Max daily 40 units     TOUJEO MAX U-300 SOLOSTAR 300 unit/mL (3 mL) insulin pen  Generic drug: insulin glargine U-300 conc  Inject 26 to 32 units under the skin at night     TRUE METRIX GLUCOSE METER Misc  Generic drug: blood-glucose meter  Use as instructed     TRUE METRIX GLUCOSE TEST STRIP Strp  Generic drug: blood sugar diagnostic  use to test blood sugar 4 times daily     TRUEPLUS LANCETS 33 gauge Misc  Generic drug: lancets  1 lancet  by Misc.(Non-Drug; Combo Route) route 4 (four) times daily.               Where to Get Your Medications        These [facility]  [address] [address]      Hours: Always Open Phone: [phone]   metroNIDAZOLE 500 MG tablet       Information about where to get these medications is not yet available    Ask your nurse or doctor about these medications  ceFEPIme 1 gram injection       The patient location is: [place]   The chief complaint leading to consultation is: Hospital Follow up     Visit type: audiovisual    Face to " Face time with patient: 15 min   35 min  minutes of total time spent on the encounter, which includes face to face time and non-face to face time preparing to see the patient (eg, review of tests), Obtaining and/or reviewing separately obtained history, Documenting clinical information in the electronic or other health record, Independently interpreting results (not separately reported) and communicating results to the patient/family/caregiver, or Care coordination (not separately reported).

## 2025-01-06 ENCOUNTER — TELEPHONE (OUTPATIENT)
Dept: PODIATRY | Facility: CLINIC | Age: 55
End: 2025-01-06
Payer: MEDICARE

## 2025-01-06 ENCOUNTER — LAB VISIT (OUTPATIENT)
Dept: LAB | Facility: HOSPITAL | Age: 55
End: 2025-01-06
Attending: INTERNAL MEDICINE
Payer: MEDICARE

## 2025-01-06 DIAGNOSIS — L97.509 DM FOOT ULCERS: Primary | ICD-10-CM

## 2025-01-06 DIAGNOSIS — E11.621 DM FOOT ULCERS: ICD-10-CM

## 2025-01-06 DIAGNOSIS — L97.509 DM FOOT ULCERS: ICD-10-CM

## 2025-01-06 DIAGNOSIS — M86.672 CHRONIC OSTEOMYELITIS OF HINDFOOT, LEFT: ICD-10-CM

## 2025-01-06 DIAGNOSIS — E11.621 DM FOOT ULCERS: Primary | ICD-10-CM

## 2025-01-06 LAB
ALBUMIN SERPL BCP-MCNC: 2.8 G/DL (ref 3.5–5.2)
ALP SERPL-CCNC: 107 U/L (ref 40–150)
ALT SERPL W/O P-5'-P-CCNC: 15 U/L (ref 10–44)
ANION GAP SERPL CALC-SCNC: 11 MMOL/L (ref 8–16)
AST SERPL-CCNC: 15 U/L (ref 10–40)
BASOPHILS # BLD AUTO: 0.04 K/UL (ref 0–0.2)
BASOPHILS NFR BLD: 0.8 % (ref 0–1.9)
BILIRUB SERPL-MCNC: 0.2 MG/DL (ref 0.1–1)
BUN SERPL-MCNC: 18 MG/DL (ref 6–20)
CALCIUM SERPL-MCNC: 9.2 MG/DL (ref 8.7–10.5)
CHLORIDE SERPL-SCNC: 106 MMOL/L (ref 95–110)
CO2 SERPL-SCNC: 25 MMOL/L (ref 23–29)
CREAT SERPL-MCNC: 0.9 MG/DL (ref 0.5–1.4)
CRP SERPL-MCNC: 1.6 MG/L (ref 0–8.2)
DIFFERENTIAL METHOD BLD: ABNORMAL
EOSINOPHIL # BLD AUTO: 0.2 K/UL (ref 0–0.5)
EOSINOPHIL NFR BLD: 4.2 % (ref 0–8)
ERYTHROCYTE [DISTWIDTH] IN BLOOD BY AUTOMATED COUNT: 14.7 % (ref 11.5–14.5)
EST. GFR  (NO RACE VARIABLE): >60 ML/MIN/1.73 M^2
GLUCOSE SERPL-MCNC: 124 MG/DL (ref 70–110)
HCT VFR BLD AUTO: 32.1 % (ref 40–54)
HGB BLD-MCNC: 9.6 G/DL (ref 14–18)
IMM GRANULOCYTES # BLD AUTO: 0.01 K/UL (ref 0–0.04)
IMM GRANULOCYTES NFR BLD AUTO: 0.2 % (ref 0–0.5)
LYMPHOCYTES # BLD AUTO: 1.9 K/UL (ref 1–4.8)
LYMPHOCYTES NFR BLD: 40.7 % (ref 18–48)
MCH RBC QN AUTO: 26.1 PG (ref 27–31)
MCHC RBC AUTO-ENTMCNC: 29.9 G/DL (ref 32–36)
MCV RBC AUTO: 87 FL (ref 82–98)
MONOCYTES # BLD AUTO: 0.5 K/UL (ref 0.3–1)
MONOCYTES NFR BLD: 11 % (ref 4–15)
NEUTROPHILS # BLD AUTO: 2 K/UL (ref 1.8–7.7)
NEUTROPHILS NFR BLD: 43.1 % (ref 38–73)
NRBC BLD-RTO: 0 /100 WBC
PLATELET # BLD AUTO: 264 K/UL (ref 150–450)
PMV BLD AUTO: 11 FL (ref 9.2–12.9)
POTASSIUM SERPL-SCNC: 3.7 MMOL/L (ref 3.5–5.1)
PROT SERPL-MCNC: 7 G/DL (ref 6–8.4)
RBC # BLD AUTO: 3.68 M/UL (ref 4.6–6.2)
SODIUM SERPL-SCNC: 142 MMOL/L (ref 136–145)
WBC # BLD AUTO: 4.74 K/UL (ref 3.9–12.7)

## 2025-01-06 PROCEDURE — 85025 COMPLETE CBC W/AUTO DIFF WBC: CPT | Mod: HCNC,PO | Performed by: INTERNAL MEDICINE

## 2025-01-06 PROCEDURE — 86140 C-REACTIVE PROTEIN: CPT | Mod: HCNC,PO | Performed by: INTERNAL MEDICINE

## 2025-01-06 PROCEDURE — 80053 COMPREHEN METABOLIC PANEL: CPT | Mod: HCNC,PO | Performed by: INTERNAL MEDICINE

## 2025-01-06 PROCEDURE — 85652 RBC SED RATE AUTOMATED: CPT | Mod: HCNC | Performed by: INTERNAL MEDICINE

## 2025-01-06 NOTE — TELEPHONE ENCOUNTER
Spoke with Mr Britni to remind him that he has an appt with Dr Reyes on 1/14/25 at 3:45 pm with an xray prior on the first floor at 3:15 pm. Verbalized understanding with no other needs voiced at this time. Call back encouraged if needed

## 2025-01-07 LAB — ERYTHROCYTE [SEDIMENTATION RATE] IN BLOOD BY PHOTOMETRIC METHOD: 116 MM/HR (ref 0–23)

## 2025-01-14 ENCOUNTER — OFFICE VISIT (OUTPATIENT)
Dept: PODIATRY | Facility: CLINIC | Age: 55
End: 2025-01-14
Payer: MEDICARE

## 2025-01-14 ENCOUNTER — HOSPITAL ENCOUNTER (OUTPATIENT)
Dept: RADIOLOGY | Facility: HOSPITAL | Age: 55
Discharge: HOME | End: 2025-01-14
Attending: PODIATRIST
Payer: MEDICARE

## 2025-01-14 VITALS
WEIGHT: 216.25 LBS | DIASTOLIC BLOOD PRESSURE: 84 MMHG | HEART RATE: 92 BPM | SYSTOLIC BLOOD PRESSURE: 134 MMHG | HEIGHT: 73 IN | BODY MASS INDEX: 28.66 KG/M2

## 2025-01-14 DIAGNOSIS — E11.610 TYPE 2 DIABETES MELLITUS WITH CHARCOT JOINT ARTHROPATHY: ICD-10-CM

## 2025-01-14 DIAGNOSIS — M89.9 DISORDER OF BONE: ICD-10-CM

## 2025-01-14 DIAGNOSIS — M86.372 CHRONIC MULTIFOCAL OSTEOMYELITIS OF LEFT FOOT: ICD-10-CM

## 2025-01-14 DIAGNOSIS — L97.426 DIABETIC ULCER OF LEFT MIDFOOT ASSOCIATED WITH DIABETES MELLITUS DUE TO UNDERLYING CONDITION, WITH BONE INVOLVEMENT WITHOUT EVIDENCE OF NECROSIS: ICD-10-CM

## 2025-01-14 DIAGNOSIS — S93.312S SUBLUXATION OF TARSAL JOINT OF FOOT, LEFT, SEQUELA: ICD-10-CM

## 2025-01-14 DIAGNOSIS — Z98.890 POSTOPERATIVE STATE: Primary | ICD-10-CM

## 2025-01-14 DIAGNOSIS — Z98.890 POSTOPERATIVE STATE: ICD-10-CM

## 2025-01-14 DIAGNOSIS — E08.621 DIABETIC ULCER OF LEFT MIDFOOT ASSOCIATED WITH DIABETES MELLITUS DUE TO UNDERLYING CONDITION, WITH BONE INVOLVEMENT WITHOUT EVIDENCE OF NECROSIS: ICD-10-CM

## 2025-01-14 PROCEDURE — 1159F MED LIST DOCD IN RCRD: CPT | Mod: HCNC,CPTII,S$GLB, | Performed by: PODIATRIST

## 2025-01-14 PROCEDURE — 1160F RVW MEDS BY RX/DR IN RCRD: CPT | Mod: HCNC,CPTII,S$GLB, | Performed by: PODIATRIST

## 2025-01-14 PROCEDURE — 73630 X-RAY EXAM OF FOOT: CPT | Mod: 26,HCNC,LT, | Performed by: RADIOLOGY

## 2025-01-14 PROCEDURE — 99999 PR PBB SHADOW E&M-EST. PATIENT-LVL V: CPT | Mod: PBBFAC,HCNC,, | Performed by: PODIATRIST

## 2025-01-14 PROCEDURE — 97597 DBRDMT OPN WND 1ST 20 CM/<: CPT | Mod: 79,HCNC,S$GLB, | Performed by: PODIATRIST

## 2025-01-14 PROCEDURE — 3079F DIAST BP 80-89 MM HG: CPT | Mod: HCNC,CPTII,S$GLB, | Performed by: PODIATRIST

## 2025-01-14 PROCEDURE — 73590 X-RAY EXAM OF LOWER LEG: CPT | Mod: TC,HCNC,FY,LT

## 2025-01-14 PROCEDURE — 3051F HG A1C>EQUAL 7.0%<8.0%: CPT | Mod: HCNC,CPTII,S$GLB, | Performed by: PODIATRIST

## 2025-01-14 PROCEDURE — 3075F SYST BP GE 130 - 139MM HG: CPT | Mod: HCNC,CPTII,S$GLB, | Performed by: PODIATRIST

## 2025-01-14 PROCEDURE — 73590 X-RAY EXAM OF LOWER LEG: CPT | Mod: 26,HCNC,LT, | Performed by: RADIOLOGY

## 2025-01-14 PROCEDURE — 99214 OFFICE O/P EST MOD 30 MIN: CPT | Mod: 57,25,HCNC,S$GLB | Performed by: PODIATRIST

## 2025-01-14 PROCEDURE — 73630 X-RAY EXAM OF FOOT: CPT | Mod: TC,HCNC,FY,LT

## 2025-01-14 PROCEDURE — 3008F BODY MASS INDEX DOCD: CPT | Mod: HCNC,CPTII,S$GLB, | Performed by: PODIATRIST

## 2025-01-14 RX ORDER — SODIUM CHLORIDE 0.9 % (FLUSH) 0.9 %
10 SYRINGE (ML) INJECTION
Status: SHIPPED | OUTPATIENT
Start: 2025-01-14

## 2025-01-14 RX ORDER — CEFAZOLIN SODIUM 2 G/50ML
2 SOLUTION INTRAVENOUS
OUTPATIENT
Start: 2025-01-14

## 2025-01-15 NOTE — PROCEDURES
"Wound Debridement    Date/Time: 1/14/2025 3:45 PM    Performed by: Daniel Reyes DPM  Authorized by: Daniel Reyes DPM    Time out: Immediately prior to procedure a "time out" was called to verify the correct patient, procedure, equipment, support staff and site/side marked as required.    Consent Done?:  Yes (Written)    Preparation: Patient was prepped and draped with clean technique    Local anesthesia used?: No      Wound Details:    Location:  Left foot    Location:  Left Plantar    Type of Debridement:  Excisional       Length (cm):  1       Width (cm):  0.5       Depth (cm):  0.1       Area (sq cm):  0.5       Percent Debrided (%):  100       Total Area Debrided (sq cm):  0.5    Depth of debridement:  Epidermis/Dermis    Tissue debrided:  Dermis    Devitalized tissue debrided:  Slough, Callus and Fibrin    Instruments:  Blade  Bleeding:  None  Patient tolerance:  Patient tolerated the procedure well with no immediate complications  1st Wound Pain Assessment: 0    " Patient currently residing at [facility], labs drawn through [place] [facility].     INR (no units)   Date Value   2018 2.9   2018 2.9   2018 2   2018 3.1   10/03/2018 4.0   2018 2.4   2018 2.4   07/10/2018 1.9       [facility] the management of the INR.   There are no reports of issues with bleeding, medication changes, or dietary changes, black tarry stools, frequent nosebleeds, excessive bruising, hematuria, or conjunctival hemorrhage. The patient is on anticoagulation therapy for LVAD.     PLAN  INR results reviewed and INR is within goal, but slightly high end of goal on current warfarin dose. Dose slightly decreased  Notified nurse at [facility] of orders as noted below.     Orders Placed This Encounter   • Prothrombin Time     This order was created through the anticoagulation tracking navigator section.   • WARFARIN - PHYSICIAN MONITORED     Si each every evening. 1 mg on MWF; 2 mg all other days. Recheck INR      Dispense:  7 each     Refill:  0       The length of this call, medical management, and documentation was 10 minutes.

## 2025-01-15 NOTE — PROGRESS NOTES
Subjective:     Patient ID: Indio Ryder Jr. is a 54 y.o. male.    Chief Complaint: Diabetic Foot Ulcer, surgical consult    Indio is a 54 y.o. male who presents to the clinic for evaluation and treatment of high risk feet. 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), Colon adenocarcinoma (04/12/2022), Diabetic ketoacidosis without coma associated with type 2 diabetes mellitus (05/30/2017), Diabetic ulcer of right foot associated with type 2 diabetes mellitus (06/03/2015), Disorder of kidney and ureter, Essential hypertension (06/06/2013), Group B streptococcal infection (01/13/2017), Mixed hyperlipidemia (08/12/2014), Septic arthritis of left foot (04/28/2021), Shoulder impingement (08/12/2014), Subacute osteomyelitis of right foot (01/12/2017), Traumatic amputation of fifth toe of right foot (07/02/2015), and Type 2 diabetes mellitus with diabetic neuropathy, with long-term current use of insulin (05/03/2016). The patient's chief complaint is foot ulcer along the left plantar midfoot.  Patient had been seeing Dr. ePterson in the [facility] on a biweekly basis for management of his left foot ulceration. He presents today for surgical consultation, evaluation for rearfoot fusion in regards to his left foot and ankle.  His last A1c was obtained in April, greater than 14.0.  Patient has had extensive pedal surgical history, last having had a debridement per Dr. Peterson in July of 2023. Patient had received a custom ankle brace in January and states that he subsequently had developed an ulceration as a result of it; previous to this, the ulceration of his left midfoot had healed over.     09/16/2024:  Follow-up from updated weight-bearing left foot and ankle x-ray imaging and lab work.  Seen per  in wound clinic on weekly basis.  Ambulating with cam boot.  States he is in the process of adjusting his medications due to uncontrolled hyperglycemia.    11/11/2024:  Returns for  re-evaluation of progressive varus deformity to left foot and chronic ulceration followed weekly at [facility]ound Care.  States his hemoglobin A1c improved and he is monitoring his continuous glucose monitor noting that his daily blood sugar values improved a lot since the last A1c was taken on 10/16/2024 had shown value of 11%.  Denies any pain.  Ambulating with tall Cam boot.  States he change his diet.    11/22/2024:  Returns to discuss CT results.  Seen weekly in Wound Care.    12/16/2024:  Returns for wound check.  Three weeks postop.  Followed by [facility].  Tolerating IV cefepime well.  Seen per .  Nonweightbearing left lower extremity.    01/14/2025:  Presents for wound check.  Relates antibiotic therapy completed.  PICC line removed.  Denies any slips, trips or falls.  No new complaints.  Accompanied by his sister.    PCP: Lorena Thakur MD      Current shoe gear:  Affected Foot: Surgical boot, rolling knee scooter     Unaffected Foot: Extra depth shoes    Hemoglobin A1C   Date Value Ref Range Status   01/02/2025 7.5 (H) 4.0 - 5.6 % Final     Comment:     ADA Screening Guidelines:  5.7-6.4%  Consistent with prediabetes  >or=6.5%  Consistent with diabetes    High levels of fetal hemoglobin interfere with the HbA1C  assay. Heterozygous hemoglobin variants (HbS, HgC, etc)do  not significantly interfere with this assay.   However, presence of multiple variants may affect accuracy.     10/16/2024 11.0 (H) 4.0 - 5.6 % Final     Comment:     ADA Screening Guidelines:  5.7-6.4%  Consistent with prediabetes  >or=6.5%  Consistent with diabetes    High levels of fetal hemoglobin interfere with the HbA1C  assay. Heterozygous hemoglobin variants (HbS, HgC, etc)do  not significantly interfere with this assay.   However, presence of multiple variants may affect accuracy.     08/08/2024 >14.0 (H) 4.0 - 5.6 % Final     Comment:     ADA Screening Guidelines:  5.7-6.4%  Consistent with prediabetes  >or=6.5%   Consistent with diabetes    High levels of fetal hemoglobin interfere with the HbA1C  assay. Heterozygous hemoglobin variants (HbS, HgC, etc)do  not significantly interfere with this assay.   However, presence of multiple variants may affect accuracy.         Review of Systems   Constitutional: Negative for chills and fever.   HENT:  Negative for congestion.    Eyes: Negative.    Cardiovascular:  Negative for chest pain.   Respiratory:  Negative for cough and shortness of breath.    Skin:  Positive for dry skin.   Musculoskeletal:  Negative for back pain and joint pain.   Gastrointestinal:  Negative for nausea and vomiting.   Neurological:  Positive for numbness.   Psychiatric/Behavioral:  Negative for altered mental status.         Objective:     Physical Exam  Vitals reviewed.   Constitutional:       General: He is not in acute distress.     Appearance: He is not ill-appearing.   Cardiovascular:      Comments: Capillary refill greater than 3 seconds to left digits.     Pedal pulses heard only through Doppler bilateral:    Triphasic PT, monophasic DP left   Monophasic PT, monophasic DP right  Musculoskeletal:         General: No swelling or tenderness.      Right lower leg: No edema.      Left lower leg: No edema.      Comments: Amputated left 5th ray.  Multiple plane external fixation left lower extremity intact with left foot at 90° to the leg improved clinical appearance of the left foot.  No broken wires noted on exam today.  Overall maintained alignment.  No significant signs of pin site irritation.   Feet:      Right foot:      Protective Sensation: 10 sites tested.  0 sites sensed.      Skin integrity: Ulcer present.      Left foot:      Protective Sensation: 10 sites tested.  0 sites sensed.   Skin:     General: Skin is warm and dry.      Findings: No ecchymosis or erythema.      Nails: There is no clubbing.      Comments: Ulceration plantar lateral left midfoot with overlying hyperkeratotic and slough  tissue.  Post debridement wound site measures 1.0 x 0.5 x 0.1 cm extending down to dermal skin.   Neurological:      Mental Status: He is alert and oriented to person, place, and time.      Cranial Nerves: No cranial nerve deficit.      Sensory: Sensory deficit present.      Comments: 0/10 [place] monofilament bilateral   Psychiatric:         Mood and Affect: Mood normal.                                             Assessment:      Encounter Diagnoses   Name Primary?    Postoperative state Yes    Type 2 diabetes mellitus with Charcot joint arthropathy     Chronic multifocal osteomyelitis of left foot     Subluxation of tarsal joint of foot, left, sequela     Disorder of bone     Diabetic ulcer of left midfoot associated with diabetes mellitus due to underlying condition, with bone involvement without evidence of necrosis      Plan:     Indio was seen today for post-op evaluation.    Diagnoses and all orders for this visit:    Postoperative state  -     X-Ray Foot Complete Left; Future  -     X-Ray Tibia Fibula 2 View Left; Future  -     Case Request Operating Room: REMOVAL, EXTERNAL FIXATION DEVICE, FUSION, FOOT  -     Full code; Standing  -     Place in Outpatient; Standing  -     Insert peripheral IV; Standing  -     Verify surgical site; Standing  -     Verify informed consent; Standing  -     Chlorhexidine (CHG) 2% Wipes; Standing  -     Insert peripheral IV    Type 2 diabetes mellitus with Charcot joint arthropathy  -     Vitamin D 25 hydroxy; Future  -     X-Ray Foot Complete Left; Future  -     X-Ray Tibia Fibula 2 View Left; Future  -     Case Request Operating Room: REMOVAL, EXTERNAL FIXATION DEVICE, FUSION, FOOT  -     Full code; Standing  -     Place in Outpatient; Standing  -     Insert peripheral IV; Standing  -     Verify surgical site; Standing  -     Verify informed consent; Standing  -     Chlorhexidine (CHG) 2% Wipes; Standing  -     Insert peripheral IV  -     Wound  Debridement    Chronic multifocal osteomyelitis of left foot  -     X-Ray Foot Complete Left; Future  -     X-Ray Tibia Fibula 2 View Left; Future  -     Case Request Operating Room: REMOVAL, EXTERNAL FIXATION DEVICE, FUSION, FOOT  -     Full code; Standing  -     Place in Outpatient; Standing  -     Insert peripheral IV; Standing  -     Verify surgical site; Standing  -     Verify informed consent; Standing  -     Chlorhexidine (CHG) 2% Wipes; Standing  -     Insert peripheral IV    Subluxation of tarsal joint of foot, left, sequela  -     Case Request Operating Room: REMOVAL, EXTERNAL FIXATION DEVICE, FUSION, FOOT  -     Full code; Standing  -     Place in Outpatient; Standing  -     Insert peripheral IV; Standing  -     Verify surgical site; Standing  -     Verify informed consent; Standing  -     Chlorhexidine (CHG) 2% Wipes; Standing  -     Insert peripheral IV    Disorder of bone  -     Vitamin D 25 hydroxy; Future    Diabetic ulcer of left midfoot associated with diabetes mellitus due to underlying condition, with bone involvement without evidence of necrosis  -     Wound Debridement    Other orders  -     sodium chloride 0.9% flush 10 mL  -     cefazolin (ANCEF) 2 gram in dextrose 5% 50 mL IVPB (premix)      I counseled the patient on his conditions, their implications and medical management.    Staple postoperative course.  No broken or infected pin sites noted.  Need for 30 antibiotic therapy at this time.    Left tib-fib x-ray reviewed noting no broken wires and no signs of osteolysis surrounding the pins.  Left foot x-ray reviewed noting maintained alignment of the left foot with intact bio active glass.    We discussed staged intervention to remove the multiplane external fixation followed by arthrodesis of multiple joints of the left foot to completed the Charcot reconstruction.  Risks, benefits anticipate postoperative course discussed in great detail.  No guarantees were given or implied.  Patient  elected to proceed with surgical intervention outlined above.    Excisional wound debridement per attached note.  Left lower extremity cleansed Hibiclens scrub.  Applied Hydrofera ready blue cut into small squares central slit around each pin site followed by [place] overlying the wound and.  Wrap com form from forefoot to proximal leg.  2 inch Ace wrap compression was also applied.    Follow-up left foot and tib-fib x-ray ordered 1 week prior to planned surgical intervention which is scheduled for 02/12/2025.  Patient will need updated labs.    RTC p.r.n. as discussed.    A portion of this note was generated by voice recognition software and may contain spelling and grammar errors.

## 2025-01-27 ENCOUNTER — DOCUMENT SCAN (OUTPATIENT)
Dept: HOME HEALTH SERVICES | Facility: HOSPITAL | Age: 55
End: 2025-01-27
Payer: MEDICARE

## 2025-01-28 ENCOUNTER — DOCUMENT SCAN (OUTPATIENT)
Dept: HOME HEALTH SERVICES | Facility: HOSPITAL | Age: 55
End: 2025-01-28
Payer: MEDICARE

## 2025-01-30 ENCOUNTER — HOSPITAL ENCOUNTER (OUTPATIENT)
Dept: PREADMISSION TESTING | Facility: HOSPITAL | Age: 55
Discharge: HOME | End: 2025-01-30
Attending: NURSE PRACTITIONER
Payer: MEDICARE

## 2025-01-30 ENCOUNTER — PATIENT MESSAGE (OUTPATIENT)
Dept: PREADMISSION TESTING | Facility: HOSPITAL | Age: 55
End: 2025-01-30

## 2025-01-30 ENCOUNTER — ANESTHESIA EVENT (OUTPATIENT)
Dept: SURGERY | Facility: HOSPITAL | Age: 55
End: 2025-01-30
Payer: MEDICARE

## 2025-01-30 ENCOUNTER — EXTERNAL HOME HEALTH (OUTPATIENT)
Dept: HOME HEALTH SERVICES | Facility: HOSPITAL | Age: 55
End: 2025-01-30
Payer: MEDICARE

## 2025-01-30 NOTE — ANESTHESIA PREPROCEDURE EVALUATION
01/30/2025  Indio Ryder Jr. is a 54 y.o., male scheduled for REMOVAL, EXTERNAL FIXATION DEVICE (Left) and FUSION, FOOT (Left: Foot) 2/12/25.    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    Right foot    Colon adenocarcinoma 04/12/2022    Diabetic ketoacidosis without coma associated with type 2 diabetes mellitus 05/30/2017    Diabetic ulcer of right foot associated with type 2 diabetes mellitus 06/03/2015    Disorder of kidney and ureter     Essential hypertension 06/06/2013    Group B streptococcal infection 01/13/2017    Mixed hyperlipidemia 08/12/2014    Septic arthritis of left foot 04/28/2021    Shoulder impingement 08/12/2014    Subacute osteomyelitis of right foot 01/12/2017    Streptococcus agalactiae, Actinomyces odontolyticus    Traumatic amputation of fifth toe of right foot 07/02/2015    Type 2 diabetes mellitus with diabetic neuropathy, with long-term current use of insulin 05/03/2016     Past Surgical History:   Procedure Laterality Date    APPLICATION, EXTERNAL FIXATION SYSTEM, MULTIPLANAR, WITH IMAGING GUIDANCE Left 11/27/2024    Procedure: APPLICATION, EXTERNAL FIXATION SYSTEM, MULTIPLANAR, WITH IMAGING GUIDANCE;  Surgeon: Daniel Reyes DPM;  Location: [facility];  Service: Podiatry;  Laterality: Left;  c-arm, TL static frame with carbon fiber foot plate(Orthofix, Jamie)    COLONOSCOPY Right 1/19/2022    Procedure: COLONOSCOPY;  Surgeon: Tj Haile MD;  Location: [facility] (4TH FLR);  Service: Endoscopy;  Laterality: Right;  previous order un-usable/poor prep 1/18/22  RAPID COVID test arrival 12:20  instructions handed to pt- sm    COLONOSCOPY N/A 3/21/2022    Procedure: COLONOSCOPY;  Surgeon: Sheng Haque MD;  Location: [facility] (2ND FLR);  Service: Endoscopy;  Laterality: N/A;  Colonoscopy with AES for hepatix flexure polyp removal, 2nd  floor  Pt is fully vaccinated-DS  Pt prescribed Plavix by Dr. Stahl in Jan. 2022, however pt states that he never started taking it-DS  3/16/22-Instructions sent via portal-DS    COLONOSCOPY N/A 9/13/2023    Procedure: COLONOSCOPY;  Surgeon: Erik Stout MD;  Location: [facility] (4TH FLR);  Service: Colon and Rectal;  Laterality: N/A;  Referred by Dr. Stout, suprep, [facility], portal -ml    DEBRIDEMENT OF FOOT Left 7/14/2019    Procedure: DEBRIDEMENT, FOOT, with left 5th ray amputation;  Surgeon: Sis Hickman DPM;  Location: [facility] OR 2ND FLR;  Service: Podiatry;  Laterality: Left;    DEBRIDEMENT OF FOOT Left 7/17/2019    Procedure: DEBRIDEMENT, FOOT with leftt 5th ray partial amputation with Neox Graft;  Surgeon: Mai Burrell DPM;  Location: [facility] OR 2ND FLR;  Service: Podiatry;  Laterality: Left;  t    DEBRIDEMENT OF FOOT Bilateral 4/29/2021    Procedure: DEBRIDEMENT, FOOT;  Surgeon: Mai Burrell DPM;  Location: [facility] OR 1ST FLR;  Service: Podiatry;  Laterality: Bilateral;  Graft application    DEBRIDEMENT OF FOOT Left 7/31/2023    Procedure: DEBRIDEMENT, FOOT;  Surgeon: Marivel Peterson DPM;  Location: [facility] OR 2ND FLR;  Service: Podiatry;  Laterality: Left;    IRRIGATION AND DEBRIDEMENT Left 11/27/2024    Procedure: IRRIGATION AND DEBRIDEMENT;  Surgeon: Daniel Reyes DPM;  Location: [facility];  Service: Podiatry;  Laterality: Left;  Bioactive/Stimulan with tobramycin and vancomycin    TOE AMPUTATION Right 06/05/2015    TOE AMPUTATION Right 01/19/2017    XI ROBOTIC COLECTOMY, RIGHT N/A 4/25/2022    Procedure: XI ROBOTIC COLECTOMY, RIGHT (lithotomy, eras low);  Surgeon: Erik Stout MD;  Location: [facility] OR 2ND FLR;  Service: Colon and Rectal;  Laterality: N/A;  Paruch to Goodwin    XI ROBOTIC COLECTOMY, RIGHT  4/25/2022    Procedure: ;  Surgeon: Erik Stout MD;  Location: [facility] OR 2ND FLR;  Service: Colon and Rectal;;     Review of patient's allergies indicates:  No Known  "Allergies    Current Outpatient Medications   Medication Instructions    blood sugar diagnostic Strp use to test blood sugar 4 times daily    blood-glucose meter Misc Use as instructed    insulin aspart U-100 (NOVOLOG) 100 unit/mL (3 mL) InPn pen Inject if sugar is > 180 up to 4x a day , 180-230+2, 231-280+4, 281-330+6, 331-380+8, >380+10. Max daily 40 units    insulin glargine U-300 conc (TOUJEO MAX U-300 SOLOSTAR) 300 unit/mL (3 mL) insulin pen Inject 26 to 32 units under the skin at night    JARDIANCE 25 mg, Oral, Daily    lancets 33 gauge Misc 1 lancet , Misc.(Non-Drug; Combo Route), 4 times daily    pen needle, diabetic (BD SASCHA 2ND GEN PEN NEEDLE) 32 gauge x 5/32" Ndle Use 4 times a day       Pre-op Assessment    I have reviewed the Patient Summary Reports.     I have reviewed the Nursing Notes. I have reviewed the NPO Status.   I have reviewed the Medications.     Review of Systems  Anesthesia Hx:  No problems with previous Anesthesia   History of prior surgery of interest to airway management or planning:            Denies Personal Hx of Anesthesia complications.                    Social:  Non-Smoker, No Alcohol Use       Hematology/Oncology:       -- Anemia:                    --  Cancer in past history (Colon cancer):              surgery       Cardiovascular:  Exercise tolerance: good   Denies Pacemaker. Hypertension       Denies Angina.     hyperlipidemia                               Pulmonary:  Pulmonary Normal      Denies Shortness of breath.                  Renal/:  Chronic Renal Disease                Hepatic/GI:  Hepatic/GI Normal        Not Taking GLP-1 Agonists            Musculoskeletal:         Spine Disorders: cervical            Neurological:  Denies TIA.  Denies CVA. Neuromuscular Disease,   Denies Seizures.                                Endocrine:  Diabetes (a1c 7.5 1/2/25), type 2, using insulin Denies Hypothyroidism.  Denies Hyperthyroidism.       Obesity / BMI > 30      Physical " "Exam  General: Cooperative, Oriented, Well nourished and Alert    Airway:  Mallampati: II   Mouth Opening: Normal  TM Distance: Normal  Tongue: Normal  Neck ROM: Normal ROM    Dental:  Intact      Lab Results   Component Value Date    WBC 4.74 01/06/2025    HGB 9.6 (L) 01/06/2025    HCT 32.1 (L) 01/06/2025     01/06/2025    CHOL 181 01/13/2023    TRIG 92 01/13/2023    HDL 49 01/13/2023    ALT 15 01/06/2025    AST 15 01/06/2025     01/06/2025    K 3.7 01/06/2025     01/06/2025    CREATININE 0.9 01/06/2025    BUN 18 01/06/2025    CO2 25 01/06/2025    TSH 1.136 03/01/2012    PSA 0.65 08/11/2014    INR 1.0 10/16/2024    HGBA1C 7.5 (H) 01/02/2025     Results for orders placed or performed in visit on 11/25/24   IN OFFICE EKG 12-LEAD (to [place])    Collection Time: 11/25/24  2:54 PM   Result Value Ref Range    QRS Duration 94 ms    OHS QTC Calculation 497 ms    Narrative    Test Reason : Z01.818,    Vent. Rate :  96 BPM     Atrial Rate :  96 BPM     P-R Int : 148 ms          QRS Dur :  94 ms      QT Int : 394 ms       P-R-T Axes :  71  78  62 degrees    QTcB Int : 497 ms    Normal sinus rhythm  Prolonged QT  Abnormal ECG  No previous ECGs available  Confirmed by Michael Weiss ([id number]) on 11/25/2024 3:06:54 PM    Referred By: RICHARD MARTINEZ           Confirmed By: Michael Weiss       Nuc med stress test 12/17/2024: "  Normal myocardial perfusion scan. There is no evidence of myocardial ischemia or infarction.    The gated perfusion images showed an ejection fraction of 36% at rest. The gated perfusion images showed an ejection fraction of 35% post stress. Normal ejection fraction is greater than 47%.    There is moderate global hypokinesis at rest and post-stress.    LV cavity size is normal at rest and normal at post-stress.    The ECG portion of the study is negative for ischemia.    The patient reported no chest pain during the stress test.    During stress, rare PACs are noted. , During stress, " "rare PVCs are noted.    When compared to a prior study from 2/28/2022, there are significant changes.The ejection fraction is now moderately reduced."     Airway history: "11/27/2024 12:00 PM         Induction:  Intravenous    Intubated:  Postinduction    Mask Ventilation:  Easy with oral airway    Attempts:  1    Method of Intubation:  Video laryngoscopy    Blade:  Murry 3    Laryngeal View Grade: Grade I - full view of cords      Difficult Airway Encountered?: No      Airway Device:  Oral endotracheal tube    Airway Device Size:  7.5    Tube secured:  23    Secured at:  The lips    Placement Verified By:  Capnometry"       Anesthesia Plan  Type of Anesthesia, risks & benefits discussed:    Anesthesia Type: Regional, Gen Supraglottic Airway, Gen Natural Airway  Intra-op Monitoring Plan: Standard ASA Monitors  Post Op Pain Control Plan: multimodal analgesia, IV/PO Opioids PRN and peripheral nerve block  Induction:  IV  Informed Consent: Informed consent signed with the Patient and all parties understand the risks and agree with anesthesia plan.  All questions answered.   ASA Score: 3  Day of Surgery Review of History & Physical: H&P completed by Anesthesiologist.    Ready For Surgery From Anesthesia Perspective.     .      "

## 2025-01-30 NOTE — PRE-PROCEDURE INSTRUCTIONS
Sister.   Allergies, medical, surgical, family and psychosocial histories reviewed with patient. Periop plan of care reviewed. Preop instructions given, including medications to take and to hold. Hibiclens soap and instructions on use given. Time allotted for questions to be addressed.      Arrival time 0630.    Surgery instructions reviewed and surgery booklet sent or emailed to the patient via the protal.

## 2025-01-30 NOTE — DISCHARGE INSTRUCTIONS
Your surgery is scheduled for 2/12/25.    Please report to Hospital Front Lobby on the 1st Floor at 0630 a.m.    THIS TIME IS SUBJECT TO CHANGE.  YOU WILL RECEIVE A PHONE CALL THE DAY BEFORE SURGERY BY 3:30 PM TO CONFIRM YOUR TIME OF ARRIVAL.  IF YOU HAVE NOT RECEIVED A PHONE CALL BY 3:30 PM THE DAY BEFORE YOUR SURGERY PLEASE CALL [phone]     INSTRUCTIONS IMPORTANT!!!  ¨Stop full meals 8 hours prior to arrival, but you can consume clear liquids up to 2 hours prior to arrival time. Clear liquids include Gatorade, water, soda, black coffee or tea (no milk or creamer), and clear juices only.                ____  Do not wear makeup, including mascara.  ____  No powder, lotions or creams to surgical area.  ____  Please remove all jewelry, including piercings and leave at home.  ____  No money or valuables needed. Please leave at home.  ____  Please bring any documents given by your doctor.  ____  If going home the same day, arrange for a ride home. You will not be able to             drive if Anesthesia was used.  ____  Children under 18 years require a parent / guardian present the entire time             they are in surgery / recovery.  ____  Wear loose fitting clothing. Allow for dressings, bandages.  ____  Stop Aspirin and blood thinners as directed by prescribing provider.  ____  Do not take any herbal, vitamins, and or supplements 2 weeks prior to surgery.  ____  Stop NSAIDS (Naproxen, Aleve, Voltaren, Celebrex, Melxoicam), Goody's, and BC powder 7 days prior to surgery.  ____  Wash the surgical area with Hibiclens or Dial Antibacterial bar soap the night before surgery, and again the             morning of surgery.  Be sure to rinse hibiclens or Dial Antibacterial bar soap off completely (if instructed by   nurse).  ____  If you take diabetic medication including Metformin, Glimepiride, Glipizide, Glyburide, Byetta, Januvia, Actos, do not take am of surgery unless            instructed by Doctor.  ____   Hold Invokana, Farxiga, and Jardiance for 3 days prior to surgery.   ____  Call MD for temperature above 101 degrees or any other signs of infection such as Urinary (bladder) infection, Upper respiratory infection, skin boils,             etc.  ____ Do Not wear your contact lenses the day of your procedure.  You may wear your glasses.      ____ Do not shave surgical site for 3 days prior to surgery.  ____ Practice Good hand washing before, during, and after procedure.      I have read or had read and explained to me, and understand the above information.  Additional comments or instructions:  For additional questions call [phone]      ANESTHESIA SIDE EFFECTS  -For the first 24 hours after surgery:  Do not drive, use heavy equipment, make important decisions, or drink alcohol  -It is normal to feel sleepy for several hours.  Rest until you are more awake.  -Have someone stay with you, if needed.  They can watch for problems and help keep you safe.  -Some possible post anesthesia side effects include: nausea and vomiting, sore throat and hoarseness, sleepiness, and dizziness.        Pre-Op Bathing Instructions    Before surgery, you can play an important role in your own health.    Because skin is not sterile, we need to be sure that your skin is as free of germs as possible. By following the instructions below, you can reduce the number of germs on your skin before surgery.    IMPORTANT: You will need to shower with a special soap called Hibiclens*, available at any pharmacy.  If you are allergic to Chlorhexidine (the antiseptic in Hibiclens), use an antibacterial soap such as Dial Soap for your preoperative shower.  You will shower with Hibiclens both the night before your surgery and the morning of your surgery.  Do not use Hibiclens on the head, face or genitals to avoid injury to those areas.    STEP #1: THE NIGHT BEFORE YOUR SURGERY     Do not shave the area of your body where your surgery will be  performed.  Shower and wash your hair and body as usual with your normal soap and shampoo.  Rinse your hair and body thoroughly after you shower to remove all soap residue.  With your hand, apply one packet of Hibiclens soap to the surgical site.   Wash the site gently for five (5) minutes. Do not scrub your skin too hard.   Do not wash with your regular soap after Hibiclens is used.  Rinse your body thoroughly.  Pat yourself dry with a clean, soft towel.  Do not use lotion, cream, or powder.  Wear clean clothes.    STEP #2: THE MORNING OF YOUR SURGERY     Repeat Step #1.    * Not to be used by people allergic to Chlorhexidine.

## 2025-02-03 PROCEDURE — G0179 MD RECERTIFICATION HHA PT: HCPCS | Mod: ,,, | Performed by: PODIATRIST

## 2025-02-04 ENCOUNTER — OFFICE VISIT (OUTPATIENT)
Dept: CARDIOLOGY | Facility: CLINIC | Age: 55
End: 2025-02-04
Payer: MEDICARE

## 2025-02-04 ENCOUNTER — HOSPITAL ENCOUNTER (OUTPATIENT)
Dept: RADIOLOGY | Facility: HOSPITAL | Age: 55
Discharge: HOME | End: 2025-02-04
Attending: PODIATRIST
Payer: MEDICARE

## 2025-02-04 ENCOUNTER — OFFICE VISIT (OUTPATIENT)
Dept: PODIATRY | Facility: CLINIC | Age: 55
End: 2025-02-04
Payer: MEDICARE

## 2025-02-04 VITALS — SYSTOLIC BLOOD PRESSURE: 125 MMHG | DIASTOLIC BLOOD PRESSURE: 74 MMHG | HEART RATE: 90 BPM

## 2025-02-04 VITALS
BODY MASS INDEX: 28.66 KG/M2 | WEIGHT: 216.25 LBS | SYSTOLIC BLOOD PRESSURE: 110 MMHG | OXYGEN SATURATION: 99 % | DIASTOLIC BLOOD PRESSURE: 69 MMHG | HEART RATE: 103 BPM | HEIGHT: 73 IN

## 2025-02-04 DIAGNOSIS — M86.372 CHRONIC MULTIFOCAL OSTEOMYELITIS OF LEFT FOOT: ICD-10-CM

## 2025-02-04 DIAGNOSIS — E08.621 DIABETIC ULCER OF TOE OF LEFT FOOT ASSOCIATED WITH DIABETES MELLITUS DUE TO UNDERLYING CONDITION, WITH NECROSIS OF BONE: Primary | ICD-10-CM

## 2025-02-04 DIAGNOSIS — I50.22 HEART FAILURE WITH MILDLY REDUCED EJECTION FRACTION: Primary | ICD-10-CM

## 2025-02-04 DIAGNOSIS — L97.524 DIABETIC ULCER OF TOE OF LEFT FOOT ASSOCIATED WITH DIABETES MELLITUS DUE TO UNDERLYING CONDITION, WITH NECROSIS OF BONE: Primary | ICD-10-CM

## 2025-02-04 DIAGNOSIS — Z98.890 POSTOPERATIVE STATE: ICD-10-CM

## 2025-02-04 DIAGNOSIS — E11.42 TYPE 2 DIABETES MELLITUS WITH PERIPHERAL NEUROPATHY: ICD-10-CM

## 2025-02-04 DIAGNOSIS — M86.172 ACUTE OSTEOMYELITIS OF TOE, LEFT: ICD-10-CM

## 2025-02-04 DIAGNOSIS — E11.610 TYPE 2 DIABETES MELLITUS WITH CHARCOT JOINT ARTHROPATHY: ICD-10-CM

## 2025-02-04 DIAGNOSIS — S82.55XA CLOSED NONDISPLACED FRACTURE OF MEDIAL MALLEOLUS OF LEFT TIBIA, INITIAL ENCOUNTER: ICD-10-CM

## 2025-02-04 DIAGNOSIS — R93.1 DECREASED CARDIAC EJECTION FRACTION: ICD-10-CM

## 2025-02-04 DIAGNOSIS — E78.2 MIXED HYPERLIPIDEMIA: ICD-10-CM

## 2025-02-04 PROCEDURE — 99214 OFFICE O/P EST MOD 30 MIN: CPT | Mod: 25,HCNC,S$GLB, | Performed by: PODIATRIST

## 2025-02-04 PROCEDURE — 99999 PR PBB SHADOW E&M-EST. PATIENT-LVL III: CPT | Mod: PBBFAC,HCNC,, | Performed by: PODIATRIST

## 2025-02-04 PROCEDURE — 73630 X-RAY EXAM OF FOOT: CPT | Mod: TC,HCNC,FY,LT

## 2025-02-04 PROCEDURE — 88311 DECALCIFY TISSUE: CPT | Mod: HCNC | Performed by: PATHOLOGY

## 2025-02-04 PROCEDURE — 99204 OFFICE O/P NEW MOD 45 MIN: CPT | Mod: HCNC,GC,S$GLB, | Performed by: STUDENT IN AN ORGANIZED HEALTH CARE EDUCATION/TRAINING PROGRAM

## 2025-02-04 PROCEDURE — 1160F RVW MEDS BY RX/DR IN RCRD: CPT | Mod: HCNC,CPTII,S$GLB, | Performed by: PODIATRIST

## 2025-02-04 PROCEDURE — 87075 CULTR BACTERIA EXCEPT BLOOD: CPT | Mod: HCNC | Performed by: PODIATRIST

## 2025-02-04 PROCEDURE — 11044 DBRDMT BONE 1ST 20 SQ CM/<: CPT | Mod: 79,HCNC,S$GLB, | Performed by: PODIATRIST

## 2025-02-04 PROCEDURE — 3074F SYST BP LT 130 MM HG: CPT | Mod: HCNC,CPTII,S$GLB, | Performed by: PODIATRIST

## 2025-02-04 PROCEDURE — 3008F BODY MASS INDEX DOCD: CPT | Mod: HCNC,CPTII,GC,S$GLB | Performed by: STUDENT IN AN ORGANIZED HEALTH CARE EDUCATION/TRAINING PROGRAM

## 2025-02-04 PROCEDURE — 88307 TISSUE EXAM BY PATHOLOGIST: CPT | Mod: HCNC | Performed by: PATHOLOGY

## 2025-02-04 PROCEDURE — 3074F SYST BP LT 130 MM HG: CPT | Mod: HCNC,CPTII,GC,S$GLB | Performed by: STUDENT IN AN ORGANIZED HEALTH CARE EDUCATION/TRAINING PROGRAM

## 2025-02-04 PROCEDURE — 73590 X-RAY EXAM OF LOWER LEG: CPT | Mod: 26,HCNC,LT, | Performed by: RADIOLOGY

## 2025-02-04 PROCEDURE — 1159F MED LIST DOCD IN RCRD: CPT | Mod: HCNC,CPTII,GC,S$GLB | Performed by: STUDENT IN AN ORGANIZED HEALTH CARE EDUCATION/TRAINING PROGRAM

## 2025-02-04 PROCEDURE — 88311 DECALCIFY TISSUE: CPT | Mod: 26,HCNC,, | Performed by: PATHOLOGY

## 2025-02-04 PROCEDURE — 1159F MED LIST DOCD IN RCRD: CPT | Mod: HCNC,CPTII,S$GLB, | Performed by: PODIATRIST

## 2025-02-04 PROCEDURE — 3078F DIAST BP <80 MM HG: CPT | Mod: HCNC,CPTII,GC,S$GLB | Performed by: STUDENT IN AN ORGANIZED HEALTH CARE EDUCATION/TRAINING PROGRAM

## 2025-02-04 PROCEDURE — 99999 PR PBB SHADOW E&M-EST. PATIENT-LVL III: CPT | Mod: PBBFAC,HCNC,GC, | Performed by: STUDENT IN AN ORGANIZED HEALTH CARE EDUCATION/TRAINING PROGRAM

## 2025-02-04 PROCEDURE — 87070 CULTURE OTHR SPECIMN AEROBIC: CPT | Mod: HCNC | Performed by: PODIATRIST

## 2025-02-04 PROCEDURE — 3051F HG A1C>EQUAL 7.0%<8.0%: CPT | Mod: HCNC,CPTII,S$GLB, | Performed by: PODIATRIST

## 2025-02-04 PROCEDURE — 73630 X-RAY EXAM OF FOOT: CPT | Mod: 26,HCNC,LT, | Performed by: RADIOLOGY

## 2025-02-04 PROCEDURE — 3078F DIAST BP <80 MM HG: CPT | Mod: HCNC,CPTII,S$GLB, | Performed by: PODIATRIST

## 2025-02-04 PROCEDURE — 73590 X-RAY EXAM OF LOWER LEG: CPT | Mod: TC,HCNC,FY,LT

## 2025-02-04 PROCEDURE — 88307 TISSUE EXAM BY PATHOLOGIST: CPT | Mod: 26,HCNC,, | Performed by: PATHOLOGY

## 2025-02-04 PROCEDURE — 3051F HG A1C>EQUAL 7.0%<8.0%: CPT | Mod: HCNC,CPTII,GC,S$GLB | Performed by: STUDENT IN AN ORGANIZED HEALTH CARE EDUCATION/TRAINING PROGRAM

## 2025-02-04 RX ORDER — METOPROLOL SUCCINATE 25 MG/1
25 TABLET, EXTENDED RELEASE ORAL DAILY
Qty: 90 TABLET | Refills: 3 | Status: SHIPPED | OUTPATIENT
Start: 2025-02-04 | End: 2026-02-04

## 2025-02-04 RX ORDER — ATORVASTATIN CALCIUM 40 MG/1
40 TABLET, FILM COATED ORAL DAILY
Qty: 90 TABLET | Refills: 3 | Status: SHIPPED | OUTPATIENT
Start: 2025-02-04 | End: 2026-02-04

## 2025-02-04 RX ORDER — DOXYCYCLINE HYCLATE 100 MG
100 TABLET ORAL 2 TIMES DAILY
Qty: 20 TABLET | Refills: 0 | Status: ON HOLD | OUTPATIENT
Start: 2025-02-04 | End: 2025-02-12

## 2025-02-04 RX ORDER — LEVOFLOXACIN 750 MG/1
750 TABLET ORAL DAILY
Qty: 10 TABLET | Refills: 0 | Status: SHIPPED | OUTPATIENT
Start: 2025-02-04

## 2025-02-04 NOTE — H&P (VIEW-ONLY)
Subjective:     Patient ID: Indio Ryder Jr. is a 54 y.o. male.    Chief Complaint: Diabetic Foot Ulcer, surgical consult    Indio is a 54 y.o. male who presents to the clinic for evaluation and treatment of high risk feet. 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), Colon adenocarcinoma (04/12/2022), Diabetic ketoacidosis without coma associated with type 2 diabetes mellitus (05/30/2017), Diabetic ulcer of right foot associated with type 2 diabetes mellitus (06/03/2015), Disorder of kidney and ureter, Essential hypertension (06/06/2013), Group B streptococcal infection (01/13/2017), Mixed hyperlipidemia (08/12/2014), Septic arthritis of left foot (04/28/2021), Shoulder impingement (08/12/2014), Subacute osteomyelitis of right foot (01/12/2017), Traumatic amputation of fifth toe of right foot (07/02/2015), and Type 2 diabetes mellitus with diabetic neuropathy, with long-term current use of insulin (05/03/2016). The patient's chief complaint is foot ulcer along the left plantar midfoot.  [place] [facility] [place] for management of his left foot ulceration. He presents today for surgical consultation, evaluation for rearfoot fusion in regards to his left foot and ankle.  His last A1c was obtained in April, greater than 14.0.  Patient has had extensive pedal surgical history, last having had a debridement per Dr. Peterson in July of 2023. Patient had received a custom ankle brace in January and states that he subsequently had developed an ulceration as a result of it; previous to this, the ulceration of his left midfoot had healed over.     09/16/2024:  Follow-up from updated weight-bearing left foot and ankle x-ray imaging and lab work.  Seen per  in wound clinic on weekly basis.  Ambulating with cam boot.  States he is in the process of adjusting his medications due to uncontrolled hyperglycemia.    11/11/2024:  Returns for  re-evaluation of progressive varus deformity to left foot and chronic ulceration followed weekly at [facility].  States his hemoglobin A1c improved and he is monitoring his continuous glucose monitor noting that his daily blood sugar values improved a lot since the last A1c was taken on 10/16/2024 had shown value of 11%.  Denies any pain.  Ambulating with tall Cam boot.  States he change his diet.    11/22/2024:  Returns to discuss CT results.  Seen weekly in [facility].    12/16/2024:  Returns for wound check.  Three weeks postop.  Followed by [facility] home [facility].  Tolerating IV cefepime well.  Seen per .  Nonweightbearing left lower extremity.    01/14/2025:  Presents for wound check.  Relates antibiotic therapy completed.  PICC line removed.  Denies any slips, trips or falls.  No new complaints.  Accompanied by his sister.    02/04/2025: Returns for wound check.  Scheduled for staged surgical intervention for arthrodesis of the left foot and removal of the external fixation next week on 02/12/2025.  Complains of the new onset wound to the left 3rd toe that began a couple weeks ago and was treated by [facility].  Believes the Ace wrap may have been too tight and caused a pressure wound.  Denies any trauma.    PCP: Lorena Thakur MD      Current shoe gear:  Affected Foot: Surgical boot, rolling knee scooter     Unaffected Foot: Extra depth shoes    Hemoglobin A1C   Date Value Ref Range Status   01/02/2025 7.5 (H) 4.0 - 5.6 % Final     Comment:     ADA Screening Guidelines:  5.7-6.4%  Consistent with prediabetes  >or=6.5%  Consistent with diabetes    High levels of fetal hemoglobin interfere with the HbA1C  assay. Heterozygous hemoglobin variants (HbS, HgC, etc)do  not significantly interfere with this assay.   However, presence of multiple variants may affect accuracy.     10/16/2024 11.0 (H) 4.0 - 5.6 % Final     Comment:     ADA Screening Guidelines:  5.7-6.4%  Consistent with prediabetes  >or=6.5%   Consistent with diabetes    High levels of fetal hemoglobin interfere with the HbA1C  assay. Heterozygous hemoglobin variants (HbS, HgC, etc)do  not significantly interfere with this assay.   However, presence of multiple variants may affect accuracy.     08/08/2024 >14.0 (H) 4.0 - 5.6 % Final     Comment:     ADA Screening Guidelines:  5.7-6.4%  Consistent with prediabetes  >or=6.5%  Consistent with diabetes    High levels of fetal hemoglobin interfere with the HbA1C  assay. Heterozygous hemoglobin variants (HbS, HgC, etc)do  not significantly interfere with this assay.   However, presence of multiple variants may affect accuracy.         Review of Systems   Constitutional: Negative for chills and fever.   HENT:  Negative for congestion.    Eyes: Negative.    Cardiovascular:  Negative for chest pain.   Respiratory:  Negative for cough and shortness of breath.    Skin:  Positive for dry skin.   Musculoskeletal:  Negative for back pain and joint pain.   Gastrointestinal:  Negative for nausea and vomiting.   Neurological:  Positive for numbness.   Psychiatric/Behavioral:  Negative for altered mental status.         Objective:     Physical Exam  Vitals reviewed.   Constitutional:       General: He is not in acute distress.     Appearance: He is not ill-appearing.   Cardiovascular:      Comments: Capillary refill greater than 3 seconds to left digits.     Pedal pulses heard only through Doppler bilateral:    Triphasic PT, monophasic DP left   Monophasic PT, monophasic DP right  Musculoskeletal:         General: No swelling or tenderness.      Right lower leg: No edema.      Left lower leg: No edema.      Comments: Amputated left 5th ray.  Multiple plane external fixation left lower extremity intact with left foot at 90° to the leg improved clinical appearance of the left foot.  No broken wires noted on exam today.  Overall maintained alignment.  No significant signs of pin site irritation.   Feet:      Right foot:       Protective Sensation: 10 sites tested.  0 sites sensed.      Skin integrity: Ulcer present.      Left foot:      Protective Sensation: 10 sites tested.  0 sites sensed.   Skin:     General: Skin is warm and dry.      Findings: No ecchymosis or erythema.      Nails: There is no clubbing.      Comments: Patient plantar lateral left midfoot appears healed.      New ulceration overlying the dorsal left 3rd toe with sloughing overlying eschar tissue noting serosanguineous drainage and at the proximal portion exposed cartilage/bone.  Post debridement wound site measures 4.0 x 1.5 x 0.4 cm.  No surrounding erythema.  No odor.   Neurological:      Mental Status: He is alert and oriented to person, place, and time.      Cranial Nerves: No cranial nerve deficit.      Sensory: Sensory deficit present.      Comments: 0/10 [place] monofilament bilateral   Psychiatric:         Mood and Affect: Mood normal.                                                       Assessment:      Encounter Diagnoses   Name Primary?    Diabetic ulcer of toe of left foot associated with diabetes mellitus due to underlying condition, with necrosis of bone Yes    Acute osteomyelitis of toe, left     Postoperative state     Type 2 diabetes mellitus with Charcot joint arthropathy     Type 2 diabetes mellitus with peripheral neuropathy     Closed nondisplaced fracture of medial malleolus of left tibia, initial encounter      Plan:     Indio was seen today for post-op evaluation.    Diagnoses and all orders for this visit:    Diabetic ulcer of toe of left foot associated with diabetes mellitus due to underlying condition, with necrosis of bone  -     Aerobic culture (Specify Source)  -     CULTURE, ANAEROBE  -     Specimen to Pathology Other  -     Wound Debridement    Acute osteomyelitis of toe, left  -     Aerobic culture (Specify Source)  -     CULTURE, ANAEROBE  -     Specimen to Pathology Other  -     Wound Debridement    Postoperative  state    Type 2 diabetes mellitus with Charcot joint arthropathy    Type 2 diabetes mellitus with peripheral neuropathy    Closed nondisplaced fracture of medial malleolus of left tibia, initial encounter    Other orders  -     levoFLOXacin (LEVAQUIN) 750 MG tablet; Take 1 tablet (750 mg total) by mouth once daily.  -     doxycycline (VIBRA-TABS) 100 MG tablet; Take 1 tablet (100 mg total) by mouth 2 (two) times daily.      I counseled the patient on his conditions, their implications and medical management.    Worsening ulceration to left 3rd toe region with exposed bone concerning for osteomyelitis.  Excisional wound debridement per attached note.  Excise bone and cartilage sent for pathology and bone C&S.  Initiate patient on Levaquin and doxycycline.  Prior history of blood C&S with Pseudomonas aeruginosa and Klebsiella aerogenes.     Left lower extremity cleansed with Hibiclens scrub and normal saline.  Applied Hydrofera ready blue over the ulceration dorsal left 3rd toe.  Hydrofera cut into small squares with central slit was applied around each pin site and lightly secured with rubber stopper.  Wrapped 3 in com form from the forefoot to the proximal leg followed by loosely applied 2 in Ace wrap commencing at the proximal forefoot avoiding the toes and extending to the proximal leg.  Dressings remain clean, dry and intact until schedule surgical intervention next week.    Continue activity restrictions and modifications.  Continue elevation at rest.    Reviewed left foot x-ray completed today noting progressive incorporation and bone growth formation in the region of the calcaneocuboid joint fusion site with staple alignment and postoperative changes.  Unable to clearly visualize the left 3rd toe but there appears to be some osteolysis to the proximal phalanx region.    Left tib-fib x-ray reviewed.  According to the radiologist there is a cortical disruption at the medial cortex of the medial malleolus  considering for a medial malleolar nondisplaced fracture.  Upon personal review I can confirmed this finding.  Once the external fixation is removed next week we will take some updated fluoro images to further evaluate.  If needed may perform ORIF of the medial malleolus.    RTC p.r.n. as discussed.  If patient develops systemic signs infection in his to present to the ED for further evaluation.  Plan is to proceed with the staged surgical intervention next week on 02/12/2025 as scheduled.     Assisted by Joshua Rudd DPM PGY 2    A portion of this note was generated by voice recognition software and may contain spelling and grammar errors.

## 2025-02-04 NOTE — PROCEDURES
"Wound Debridement    Date/Time: 2/4/2025 3:00 PM    Performed by: Daniel Reyes DPM  Authorized by: Daniel Reyes DPM    Time out: Immediately prior to procedure a "time out" was called to verify the correct patient, procedure, equipment, support staff and site/side marked as required.    Consent Done?:  Yes (Written)    Preparation: Patient was prepped and draped with clean technique    Local anesthesia used?: No      Wound Details:    Location:  Left foot    Location:  Left 3rd Toe    Type of Debridement:  Excisional       Length (cm):  4       Width (cm):  1.5       Depth (cm):  0.4       Area (sq cm):  6       Percent Debrided (%):  100       Total Area Debrided (sq cm):  6    Depth of debridement:  Bone    Tissue debrided:  Bone, Dermis, Epidermis, Subcutaneous, Joint Capsule and Fascia    Devitalized tissue debrided:  Necrotic/Eschar, Slough and Fibrin    Instruments:  Blade, Forceps and Rongeur  Bleeding:  Moderate  Hemostasis Achieved: Yes  Method Used:  Pressure  Patient tolerance:  Patient tolerated the procedure well with no immediate complications  1st Wound Pain Assessment: 0     Rongeur used to obtain bone and cartilage from the left third toe dorsal PIPJ region sent for pathology and bone c&s.     "

## 2025-02-04 NOTE — ASSESSMENT & PLAN NOTE
Last LDL: 113.3   - Given Hx of DM, And elevated LDL   - ASCVD score; 21.8%     Recommendation   - Start Atorvastatin 40 mg   - Will obtain Lipid profile

## 2025-02-04 NOTE — ASSESSMENT & PLAN NOTE
Pt with Hx of Uncontrolled DM on insulin, retinopathy, neuropathy, HTN and HLD presented to the clinic for evaluation after abnormal EKG noted T wave inversion in inferior leads; was referred for Cardia PET stress; found to have decrease in EF   - /69    - Reported autonomic dysfunction likely from Chronic uncontrolled DM   - Denies Orthopnea, PND, LE swelling or MYRICK.     Recommendation   - Continue with Jardiance 10 mg   - Adequate BG control with goal A1C <7%   - Adequate BP control with goal <130/80   - Will obtain ECHO to re-evaluate LV function  - Start Metoprolol succinate 25 mg qd   - Will obtain Labs, TSH, BNP, BMP

## 2025-02-04 NOTE — PROGRESS NOTES
Cardiology Clinic Note  Reason for Visit: Establish Care - Reduce EF     HPI:   53y/o M with PMHx of HTN, HLD, Uncontrolled DM with non proliferative retinopathy and neuropathy, subacute osteomyelitis of right foot (01/12/2017), Traumatic amputation of fifth toe of right foot (07/02/2015) , colon adenocarcinoma s/p colectomy  4/25/2022. Stopped lisinopril r/t hypotension 90/60s-dealing with orthostatic hypotension. Pt with Hx of Charcot foot undergoing reconstruction. During pre-operative evaluation; pt was evaluated with Cardia PET stress and result significant with reduced EF. Upon encounter; pt denies chest pain with exertion or at rest, palpitations, shortness of breath, dyspnea on exertion, dizziness, syncope, edema, orthopnea, paroxysmal nocturnal dyspnea, or claudication.    - Denies FamHx of Cardiac Disease except Mother with Pacemaker     ROS:    Constitution: Negative for fever, chills, weight loss or gain.   HENT: Negative for sore throat, rhinorrhea, or headache.  Eyes: Negative for blurred or double vision.   Cardiovascular: See above  Pulmonary: Negative for SOB   Gastrointestinal: Negative for abdominal pain, nausea, vomiting, or diarrhea.   : Negative for dysuria.   Neurological: Negative for focal weakness or sensory changes.  PMH:     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    Right foot    Colon adenocarcinoma 04/12/2022    Diabetic ketoacidosis without coma associated with type 2 diabetes mellitus 05/30/2017    Diabetic ulcer of right foot associated with type 2 diabetes mellitus 06/03/2015    Disorder of kidney and ureter     Essential hypertension 06/06/2013    Group B streptococcal infection 01/13/2017    Mixed hyperlipidemia 08/12/2014    Septic arthritis of left foot 04/28/2021    Shoulder impingement 08/12/2014    Subacute osteomyelitis of right foot 01/12/2017    Streptococcus agalactiae, Actinomyces odontolyticus    Traumatic amputation of fifth  toe of right foot 07/02/2015    Type 2 diabetes mellitus with diabetic neuropathy, with long-term current use of insulin 05/03/2016     Past Surgical History:   Procedure Laterality Date    APPLICATION, EXTERNAL FIXATION SYSTEM, MULTIPLANAR, WITH IMAGING GUIDANCE Left 11/27/2024    Procedure: APPLICATION, EXTERNAL FIXATION SYSTEM, MULTIPLANAR, WITH IMAGING GUIDANCE;  Surgeon: Daniel Reyes DPM;  Location: [facility] OR;  Service: Podiatry;  Laterality: Left;  c-arm, TL static frame with carbon fiber foot plate([employer], Jamie)    COLONOSCOPY Right 1/19/2022    Procedure: COLONOSCOPY;  Surgeon: Tj Haile MD;  Location: [facility] ENDO (4TH FLR);  Service: Endoscopy;  Laterality: Right;  previous order un-usable/poor prep 1/18/22  RAPID COVID test arrival 12:20  instructions handed to pt- sm    COLONOSCOPY N/A 3/21/2022    Procedure: COLONOSCOPY;  Surgeon: Sheng Haque MD;  Location: [facility] END[facility] (2ND FLR);  Service: Endoscopy;  Laterality: N/A;  Colonoscopy with AES for hepatix flexure polyp removal, 2nd floor  Pt is fully vaccinated-DS  Pt prescribed Plavix by Dr. Stahl in Jan. 2022, however pt states that he never started taking it-DS  3/16/22-Instructions sent via portal-DS    COLONOSCOPY N/A 9/13/2023    Procedure: COLONOSCOPY;  Surgeon: Erik Stout MD;  Location: [facility] (4TH FLR);  Service: Colon and Rectal;  Laterality: N/A;  Referred by serg Saunders, [facility], portal -ml    DEBRIDEMENT OF FOOT Left 7/14/2019    Procedure: DEBRIDEMENT, FOOT, with left 5th ray amputation;  Surgeon: Sis Hickman DPM;  Location: [facility] OR 2ND FLR;  Service: Podiatry;  Laterality: Left;    DEBRIDEMENT OF FOOT Left 7/17/2019    Procedure: DEBRIDEMENT, FOOT with leftt 5th ray partial amputation with Neox Graft;  Surgeon: Mai Burrell DPM;  Location: [facility] OR 2ND FLR;  Service: Podiatry;  Laterality: Left;  t    DEBRIDEMENT OF FOOT Bilateral 4/29/2021    Procedure: DEBRIDEMENT, FOOT;  Surgeon: Mai MCGILL  "Indra, CLEM;  Location: [facility] OR 1ST FLR;  Service: Podiatry;  Laterality: Bilateral;  Graft application    DEBRIDEMENT OF FOOT Left 7/31/2023    Procedure: DEBRIDEMENT, FOOT;  Surgeon: Marivel Peterson DPM;  Location: [facility] OR 2ND FLR;  Service: Podiatry;  Laterality: Left;    IRRIGATION AND DEBRIDEMENT Left 11/27/2024    Procedure: IRRIGATION AND DEBRIDEMENT;  Surgeon: Daniel Reyes DPM;  Location: [facility] OR;  Service: Podiatry;  Laterality: Left;  Bioactive/Stimulan with tobramycin and vancomycin    TOE AMPUTATION Right 06/05/2015    TOE AMPUTATION Right 01/19/2017    XI ROBOTIC COLECTOMY, RIGHT N/A 4/25/2022    Procedure: XI ROBOTIC COLECTOMY, RIGHT (lithotomy, eras low);  Surgeon: Erik Stout MD;  Location: [facility] OR 2ND FLR;  Service: Colon and Rectal;  Laterality: N/A;  Paruch to Goodwin    XI ROBOTIC COLECTOMY, RIGHT  4/25/2022    Procedure: ;  Surgeon: Erik Stout MD;  [facility];  Service: Colon and Rectal;;     Allergies:   Review of patient's allergies indicates:  No Known Allergies  Medications:     Current Outpatient Medications on File Prior to Visit   Medication Sig Dispense Refill    blood sugar diagnostic Strp use to test blood sugar 4 times daily 100 each 0    blood-glucose meter Misc Use as instructed 1 each 1    empagliflozin (JARDIANCE) 25 mg tablet Take 1 tablet (25 mg total) by mouth once daily. 30 tablet 8    insulin aspart U-100 (NOVOLOG) 100 unit/mL (3 mL) InPn pen Inject if sugar is > 180 up to 4x a day , 180-230+2, 231-280+4, 281-330+6, 331-380+8, >380+10. Max daily 40 units 15 mL 2    insulin glargine U-300 conc (TOUJEO MAX U-300 SOLOSTAR) 300 unit/mL (3 mL) insulin pen Inject 26 to 32 units under the skin at night 2 Pen 6    lancets 33 gauge Misc 1 lancet  by Misc.(Non-Drug; Combo Route) route 4 (four) times daily. 100 each 0    pen needle, diabetic (BD SASCHA 2ND GEN PEN NEEDLE) 32 gauge x 5/32" Ndle Use 4 times a day 400 each 3     Current " "Facility-Administered Medications on File Prior to Visit   Medication Dose Route Frequency Provider Last Rate Last Admin    sodium chloride 0.9% flush 10 mL  10 mL Intravenous PRDaniel Yun DPM         Social History:     Social History     Tobacco Use    Smoking status: Never    Smokeless tobacco: Never   Substance Use Topics    Alcohol use: No     Family History:     Family History   Adopted: Yes   Problem Relation Name Age of Onset    Hypertension Mother      Cancer Mother          breast    Diabetes Mother      Hypertension Father      Cataracts Father      Heart disease Father          mi    Diabetes Sister Michelle     No Known Problems Brother      Heart disease Sister Manuela         MI    No Known Problems Sister      Hypertension Maternal Aunt      No Known Problems Maternal Uncle      No Known Problems Paternal Aunt      No Known Problems Paternal Uncle      No Known Problems Maternal Grandmother      No Known Problems Maternal Grandfather      No Known Problems Paternal Grandmother      No Known Problems Paternal Grandfather      Amblyopia Neg Hx      Blindness Neg Hx      Glaucoma Neg Hx      Macular degeneration Neg Hx      Retinal detachment Neg Hx      Strabismus Neg Hx      Stroke Neg Hx      Thyroid disease Neg Hx       Physical Exam:   /69 (Patient Position: Sitting)   Pulse 103   Ht 6' 1" (1.854 m)   Wt 98.1 kg (216 lb 4.3 oz)   SpO2 99%   BMI 28.53 kg/m²    Wt Readings from Last 4 Encounters:   02/04/25 98.1 kg (216 lb 4.3 oz)   01/14/25 98.1 kg (216 lb 4.3 oz)   12/16/24 98.1 kg (216 lb 4.3 oz)   11/27/24 98.1 kg (216 lb 4.3 oz)       Physical Exam   Constitutional: No distress, obese, conversant  HEENT: Sclera anicteric, PERRLA  Neck: No JVD, no masses, good movement  CV: RRR, S1 and S2 normal, no additional heart sounds or murmurs. Pulses 2+ and equal bilaterally in radial arteries and femoral, DP and PT areas bilaterally  Pulm: Clear to auscultation bilaterally with " symmetrical expansion.   GI: Abdomen soft, non-tender, good bowel sounds  Extremities: Left Foot with surgical bone support device.   Psych: AOx3, appropriate affect      Labs:     Lab Results   Component Value Date     01/06/2025    K 3.7 01/06/2025     01/06/2025    CO2 25 01/06/2025    BUN 18 01/06/2025    CREATININE 0.9 01/06/2025    ANIONGAP 11 01/06/2025     Lab Results   Component Value Date    HGBA1C 7.5 (H) 01/02/2025     Lab Results   Component Value Date     (H) 03/21/2020    BNP 41 07/12/2019    Lab Results   Component Value Date    WBC 4.74 01/06/2025    HGB 9.6 (L) 01/06/2025    HCT 32.1 (L) 01/06/2025    HCT 36 10/16/2024     01/06/2025    GRAN 2.0 01/06/2025    GRAN 43.1 01/06/2025     Lab Results   Component Value Date    CHOL 181 01/13/2023    HDL 49 01/13/2023    LDLCALC 113.6 01/13/2023    TRIG 92 01/13/2023          Imaging:        EF   Date Value Ref Range Status   02/28/2022 50 % Final     Nuc Stress EF   Date Value Ref Range Status   12/17/2024 35 % Final     Nuc Rest EF   Date Value Ref Range Status   12/17/2024 36  Final   02/28/2022 50  Final       Cardiac SPECT Stress 11/25/24     Normal myocardial perfusion scan. There is no evidence of myocardial ischemia or infarction.    The gated perfusion images showed an ejection fraction of 36% at rest. The gated perfusion images showed an ejection fraction of 35% post stress. Normal ejection fraction is greater than 47%.    There is moderate global hypokinesis at rest and post-stress.    LV cavity size is normal at rest and normal at post-stress.    The ECG portion of the study is negative for ischemia.    The patient reported no chest pain during the stress test.    During stress, rare PACs are noted. , During stress, rare PVCs are noted.    When compared to a prior study from 2/28/2022, there are significant changes.The ejection fraction is now moderately reduced.      ECHO 2/22/22  Eccentric hypertrophy and low normal  systolic function.  Mild to moderate left atrial enlargement.  Mild tricuspid regurgitation.  The estimated ejection fraction is 50%.  Grade I left ventricular diastolic dysfunction.  Normal right ventricular size with normal right ventricular systolic function.  Mild right atrial enlargement.  Normal central venous pressure (3 mmHg).  The estimated PA systolic pressure is 21 mmHg.          Assessment and Plan:      Heart failure with mildly reduced ejection fraction  Pt with Hx of Uncontrolled DM on insulin, retinopathy, neuropathy, HTN and HLD presented to the clinic for evaluation after abnormal EKG noted T wave inversion in inferior leads; was referred for Cardia PET stress; found to have decrease in EF   - /69    - Reported autonomic dysfunction likely from Chronic uncontrolled DM   - Denies Orthopnea, PND, LE swelling or MYRICK.     Recommendation   - Continue with Jardiance 10 mg   - Adequate BG control with goal A1C <7%   - Adequate BP control with goal <130/80   - Will obtain ECHO to re-evaluate LV function  - Start Metoprolol succinate 25 mg qd   - Will obtain Labs, TSH, BNP, BMP     Mixed hyperlipidemia  Last LDL: 113.3   - Given Hx of DM, And elevated LDL   - ASCVD score; 21.8%     Recommendation   - Start Atorvastatin 40 mg   - Will obtain Lipid profile           Keep a home BP diary and bring to next visit  Discussed mediterranean diet  Discussed exercise therapy based on 150-min per week AHA recommendations for moderate intensity exercise/75 min for high-intensity exercise    Signed:  Chrystal Peña Obi, M.D.  Cardiology Fellow  [facility]  2/4/2025 6:48 AM

## 2025-02-04 NOTE — PROGRESS NOTES
Current patient location: [address] [place] [address]   [facility] [id number]    Is the patient currently in the state of [place]? YES    Visit mode:VIDEO    If the visit is dropped, the patient can be reconnected by:VIDEO VISIT: Text to cell phone:   Telephone Information:   Mobile [phone]       Will anyone else be joining the visit? NO  (If patient encounters technical issues they should call [phone])    Are changes needed to the allergy or medication list? No    Are refills needed on medications prescribed by this physician? NO    Rooming Documentation:  Questionnaire(s) not done per department protocol    Reason for visit: RADU OCONNOR      I have personally seen and examined the patient. All labs and studies were reviewed. I agree with the fellows findings and plan as above.

## 2025-02-04 NOTE — PROGRESS NOTES
Subjective:     Patient ID: Indio Ryder Jr. is a 54 y.o. male.    Chief Complaint: Diabetic Foot Ulcer, surgical consult    Indio is a 54 y.o. male who presents to the clinic for evaluation and treatment of high risk feet. 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), Colon adenocarcinoma (04/12/2022), Diabetic ketoacidosis without coma associated with type 2 diabetes mellitus (05/30/2017), Diabetic ulcer of right foot associated with type 2 diabetes mellitus (06/03/2015), Disorder of kidney and ureter, Essential hypertension (06/06/2013), Group B streptococcal infection (01/13/2017), Mixed hyperlipidemia (08/12/2014), Septic arthritis of left foot (04/28/2021), Shoulder impingement (08/12/2014), Subacute osteomyelitis of right foot (01/12/2017), Traumatic amputation of fifth toe of right foot (07/02/2015), and Type 2 diabetes mellitus with diabetic neuropathy, with long-term current use of insulin (05/03/2016). The patient's chief complaint is foot ulcer along the left plantar midfoot.  Patient had been seeing Dr. Peterson in the [facility] on a biweekly basis for management of his left foot ulceration. He presents today for surgical consultation, evaluation for rearfoot fusion in regards to his left foot and ankle.  His last A1c was obtained in April, greater than 14.0.  Patient has had extensive pedal surgical history, last having had a debridement per Dr. Peterson in July of 2023. Patient had received a custom ankle brace in January and states that he subsequently had developed an ulceration as a result of it; previous to this, the ulceration of his left midfoot had healed over.     09/16/2024:  Follow-up from updated weight-bearing left foot and ankle x-ray imaging and lab work.  Seen per  in wound clinic on weekly basis.  Ambulating with cam boot.  States he is in the process of adjusting his medications due to uncontrolled hyperglycemia.    11/11/2024:  Returns for  re-evaluation of progressive varus deformity to left foot and chronic ulceration followed weekly at [facility].  States his hemoglobin A1c improved and he is monitoring his continuous glucose monitor noting that his daily blood sugar values improved a lot since the last A1c was taken on 10/16/2024 had shown value of 11%.  Denies any pain.  Ambulating with tall Cam boot.  States he change his diet.    11/22/2024:  Returns to discuss CT results.  Seen weekly in Wound Care.    12/16/2024:  Returns for wound check.  Three weeks postop.  Followed by [facility].  Tolerating IV cefepime well.  Seen per .  Nonweightbearing left lower extremity.    01/14/2025:  Presents for wound check.  Relates antibiotic therapy completed.  PICC line removed.  Denies any slips, trips or falls.  No new complaints.  Accompanied by his sister.    02/04/2025: Returns for wound check.  Scheduled for staged surgical intervention for arthrodesis of the left foot and removal of the external fixation next week on 02/12/2025.  Complains of the new onset wound to the left 3rd toe that began a couple weeks ago and was treated by [facility].  Believes the Ace wrap may have been too tight and caused a pressure wound.  Denies any trauma.    PCP: Lorena Thakur MD      Current shoe gear:  Affected Foot: Surgical boot, rolling knee scooter     Unaffected Foot: Extra depth shoes    Hemoglobin A1C   Date Value Ref Range Status   01/02/2025 7.5 (H) 4.0 - 5.6 % Final     Comment:     ADA Screening Guidelines:  5.7-6.4%  Consistent with prediabetes  >or=6.5%  Consistent with diabetes    High levels of fetal hemoglobin interfere with the HbA1C  assay. Heterozygous hemoglobin variants (HbS, HgC, etc)do  not significantly interfere with this assay.   However, presence of multiple variants may affect accuracy.     10/16/2024 11.0 (H) 4.0 - 5.6 % Final     Comment:     ADA Screening Guidelines:  5.7-6.4%  Consistent with prediabetes  >or=6.5%   Consistent with diabetes    High levels of fetal hemoglobin interfere with the HbA1C  assay. Heterozygous hemoglobin variants (HbS, HgC, etc)do  not significantly interfere with this assay.   However, presence of multiple variants may affect accuracy.     08/08/2024 >14.0 (H) 4.0 - 5.6 % Final     Comment:     ADA Screening Guidelines:  5.7-6.4%  Consistent with prediabetes  >or=6.5%  Consistent with diabetes    High levels of fetal hemoglobin interfere with the HbA1C  assay. Heterozygous hemoglobin variants (HbS, HgC, etc)do  not significantly interfere with this assay.   However, presence of multiple variants may affect accuracy.         Review of Systems   Constitutional: Negative for chills and fever.   HENT:  Negative for congestion.    Eyes: Negative.    Cardiovascular:  Negative for chest pain.   Respiratory:  Negative for cough and shortness of breath.    Skin:  Positive for dry skin.   Musculoskeletal:  Negative for back pain and joint pain.   Gastrointestinal:  Negative for nausea and vomiting.   Neurological:  Positive for numbness.   Psychiatric/Behavioral:  Negative for altered mental status.         Objective:     Physical Exam  Vitals reviewed.   Constitutional:       General: He is not in acute distress.     Appearance: He is not ill-appearing.   Cardiovascular:      Comments: Capillary refill greater than 3 seconds to left digits.     Pedal pulses heard only through Doppler bilateral:    Triphasic PT, monophasic DP left   Monophasic PT, monophasic DP right  Musculoskeletal:         General: No swelling or tenderness.      Right lower leg: No edema.      Left lower leg: No edema.      Comments: Amputated left 5th ray.  Multiple plane external fixation left lower extremity intact with left foot at 90° to the leg improved clinical appearance of the left foot.  No broken wires noted on exam today.  Overall maintained alignment.  No significant signs of pin site irritation.   Feet:      Right foot:       Protective Sensation: 10 sites tested.  0 sites sensed.      Skin integrity: Ulcer present.      Left foot:      Protective Sensation: 10 sites tested.  0 sites sensed.   Skin:     General: Skin is warm and dry.      Findings: No ecchymosis or erythema.      Nails: There is no clubbing.      Comments: Patient plantar lateral left midfoot appears healed.      New ulceration overlying the dorsal left 3rd toe with sloughing overlying eschar tissue noting serosanguineous drainage and at the proximal portion exposed cartilage/bone.  Post debridement wound site measures 4.0 x 1.5 x 0.4 cm.  No surrounding erythema.  No odor.   Neurological:      Mental Status: He is alert and oriented to person, place, and time.      Cranial Nerves: No cranial nerve deficit.      Sensory: Sensory deficit present.      Comments: 0/10 [place] monofilament bilateral   Psychiatric:         Mood and Affect: Mood normal.                                                       Assessment:      Encounter Diagnoses   Name Primary?    Diabetic ulcer of toe of left foot associated with diabetes mellitus due to underlying condition, with necrosis of bone Yes    Acute osteomyelitis of toe, left     Postoperative state     Type 2 diabetes mellitus with Charcot joint arthropathy     Type 2 diabetes mellitus with peripheral neuropathy     Closed nondisplaced fracture of medial malleolus of left tibia, initial encounter      Plan:     Indio was seen today for post-op evaluation.    Diagnoses and all orders for this visit:    Diabetic ulcer of toe of left foot associated with diabetes mellitus due to underlying condition, with necrosis of bone  -     Aerobic culture (Specify Source)  -     CULTURE, ANAEROBE  -     Specimen to Pathology Other  -     Wound Debridement    Acute osteomyelitis of toe, left  -     Aerobic culture (Specify Source)  -     CULTURE, ANAEROBE  -     Specimen to Pathology Other  -     Wound Debridement    Postoperative  state    Type 2 diabetes mellitus with Charcot joint arthropathy    Type 2 diabetes mellitus with peripheral neuropathy    Closed nondisplaced fracture of medial malleolus of left tibia, initial encounter    Other orders  -     levoFLOXacin (LEVAQUIN) 750 MG tablet; Take 1 tablet (750 mg total) by mouth once daily.  -     doxycycline (VIBRA-TABS) 100 MG tablet; Take 1 tablet (100 mg total) by mouth 2 (two) times daily.      I counseled the patient on his conditions, their implications and medical management.    Worsening ulceration to left 3rd toe region with exposed bone concerning for osteomyelitis.  Excisional wound debridement per attached note.  Excise bone and cartilage sent for pathology and bone C&S.  Initiate patient on Levaquin and doxycycline.  Prior history of blood C&S with Pseudomonas aeruginosa and Klebsiella aerogenes.     Left lower extremity cleansed with Hibiclens scrub and normal saline.  Applied Hydrofera ready blue over the ulceration dorsal left 3rd toe.  Hydrofera cut into small squares with central slit was applied around each pin site and lightly secured with rubber stopper.  Wrapped 3 in com form from the forefoot to the proximal leg followed by loosely applied 2 in Ace wrap commencing at the proximal forefoot avoiding the toes and extending to the proximal leg.  Dressings remain clean, dry and intact until schedule surgical intervention next week.    Continue activity restrictions and modifications.  Continue elevation at rest.    Reviewed left foot x-ray completed today noting progressive incorporation and bone growth formation in the region of the calcaneocuboid joint fusion site with staple alignment and postoperative changes.  Unable to clearly visualize the left 3rd toe but there appears to be some osteolysis to the proximal phalanx region.    Left tib-fib x-ray reviewed.  According to the radiologist there is a cortical disruption at the medial cortex of the medial malleolus  considering for a medial malleolar nondisplaced fracture.  Upon personal review I can confirmed this finding.  Once the external fixation is removed next week we will take some updated fluoro images to further evaluate.  If needed may perform ORIF of the medial malleolus.    RTC p.r.n. as discussed.  If patient develops systemic signs infection in his to present to the ED for further evaluation.  Plan is to proceed with the staged surgical intervention next week on 02/12/2025 as scheduled.     Assisted by Joshua Rudd DPM PGY 2    A portion of this note was generated by voice recognition software and may contain spelling and grammar errors.

## 2025-02-06 LAB
FINAL PATHOLOGIC DIAGNOSIS: NORMAL
GROSS: NORMAL
Lab: NORMAL
MICROSCOPIC EXAM: NORMAL

## 2025-02-06 NOTE — PROGRESS NOTES
02/27/23 1000   Hyperbaric Pre-Inspection   Mattress cleaned prior to treatment Yes   2 Patient Identifiers Verified Yes   Consent Obtained Yes   Cotton Gown Yes   Patient voided Yes   Drainage Bags Emptied Not applicable   Patient Pregnant Not applicable   Glasses, Jewelry, Makeup, etc. Removed Yes   Dentures, Hearing Aid, Prosthetic Devices Removed Yes   Touch hair to check for hair spray Yes   Cold/Flu Symptoms No   Diabetic Patient Eaten Yes   Medications Given Not applicable   Fears and apprehensions verbalized Yes   Ground Wire Secured Yes   HBO Treatment Course Details   Treatment Course Number 1   Ordering Provider Waylon SCHILLING   Indications Diabetic wounds of lower extremities   HBO Treatment Start Date 12/19/22   HBO Treatment Details   Treatment Number 43   Inpatient Visit Yes   Total Treatment Length Calc (minutes) 108   Chamber Type Monoplace   Chamber # 2   HBO Dive Log # 1212   Treatment Protocol 2.0 SURI x 90 minutes w/100% oxygen and no air break   Treatment Details   Dive Rate Down 1.5 psi/minute   Dive Rate Up 2.0 psi/minute   Compress Begins 1 SURI   Clock Time 1020   Tx Pressure Reached 2 SURI   Clock Time 1030   Decompress Begins 2 SURI   Clock Time 1200   Decompress Ends 1 SURI   Clock Time 1208   Pre HBO Vital Signs   /64   Pulse 99   Resp 18   Temp 97.7 °F (36.5 °C)   Blood Glucose 155   Glucose Meter # wc   Pain Level 0   Post HBO Vital Signs   BP (!) 176/93   Pulse 85   Resp 16   Blood Glucose 150   Glucose Meter # wc         Arrived awake and alert. ABC's intact. Ambulated without assistance. Cleared for treatment by Waylon SCHILLING Tolerated treatment without complaints. Cleared from chamber without incidents. Follow up tomorrow for HBO.         ICD-10-CM ICD-9-CM   1. Chronic osteomyelitis of right foot  M86.671 730.17   2. Acute on chronic osteomyelitis  M86.10 730.00    M86.60 730.10   3. Diabetic ulcer of left midfoot associated with type 2 diabetes mellitus, with bone involvement    Problem: Chronic Conditions and Co-morbidities  Goal: Patient's chronic conditions and co-morbidity symptoms are monitored and maintained or improved  2/6/2025 0908 by Hillary Schrader RN  Outcome: Progressing  2/5/2025 2347 by Shira Braun RN  Outcome: Progressing     Problem: Discharge Planning  Goal: Discharge to home or other facility with appropriate resources  2/6/2025 0908 by Hillary Schrader RN  Outcome: Progressing  2/5/2025 2347 by Shira Braun RN  Outcome: Progressing  Flowsheets (Taken 2/5/2025 1415 by Jacquelyn Mathew)  Discharge to home or other facility with appropriate resources: Identify barriers to discharge with patient and caregiver     Problem: Pain  Goal: Verbalizes/displays adequate comfort level or baseline comfort level  2/6/2025 0908 by Hillary Schrader RN  Outcome: Progressing  2/5/2025 2347 by Shira Braun RN  Outcome: Progressing  Flowsheets  Taken 2/5/2025 1625 by Mariela Taveras RN  Verbalizes/displays adequate comfort level or baseline comfort level:   Encourage patient to monitor pain and request assistance   Assess pain using appropriate pain scale  Taken 2/5/2025 1200 by Mariela Taveras RN  Verbalizes/displays adequate comfort level or baseline comfort level:   Encourage patient to monitor pain and request assistance   Assess pain using appropriate pain scale     Problem: Safety - Adult  Goal: Free from fall injury  2/6/2025 0908 by Hillary Schrader RN  Outcome: Progressing  2/5/2025 2347 by Shira Braun RN  Outcome: Progressing     Problem: ABCDS Injury Assessment  Goal: Absence of physical injury  2/6/2025 0908 by Hillary Schrader RN  Outcome: Progressing  2/5/2025 2347 by Shira Braun RN  Outcome: Progressing     Problem: Skin/Tissue Integrity  Goal: Skin integrity remains intact  Description: 1.  Monitor for areas of redness and/or skin breakdown  2.  Assess vascular access sites hourly  3.  Every 4-6 hours minimum:  Change oxygen saturation  without evidence of necrosis  E11.621 250.80    L97.426 707.14   4. Diabetic ulcer of right midfoot associated with type 2 diabetes mellitus, with bone involvement without evidence of necrosis  E11.621 250.80    L97.416 707.14        Physician Supervision  I provided direct supervision and was immediately available to furnish assistance and direction throughout the performance of the procedure.    Emergency Response Team  A trained emergency response team and emergency services were available throughout procedure.

## 2025-02-09 LAB — BACTERIA SPEC AEROBE CULT: NO GROWTH

## 2025-02-10 LAB — BACTERIA SPEC ANAEROBE CULT: NORMAL

## 2025-02-11 ENCOUNTER — LAB VISIT (OUTPATIENT)
Dept: LAB | Facility: HOSPITAL | Age: 55
End: 2025-02-11
Payer: MEDICARE

## 2025-02-11 DIAGNOSIS — I50.22 HEART FAILURE WITH MILDLY REDUCED EJECTION FRACTION: ICD-10-CM

## 2025-02-11 DIAGNOSIS — E78.2 MIXED HYPERLIPIDEMIA: ICD-10-CM

## 2025-02-11 LAB
ANION GAP SERPL CALC-SCNC: 11 MMOL/L (ref 8–16)
BNP SERPL-MCNC: 43 PG/ML (ref 0–99)
BUN SERPL-MCNC: 16 MG/DL (ref 6–20)
CALCIUM SERPL-MCNC: 9.7 MG/DL (ref 8.7–10.5)
CHLORIDE SERPL-SCNC: 104 MMOL/L (ref 95–110)
CHOLEST SERPL-MCNC: 171 MG/DL (ref 120–199)
CHOLEST/HDLC SERPL: 3.3 {RATIO} (ref 2–5)
CO2 SERPL-SCNC: 26 MMOL/L (ref 23–29)
CREAT SERPL-MCNC: 1.1 MG/DL (ref 0.5–1.4)
EST. GFR  (NO RACE VARIABLE): >60 ML/MIN/1.73 M^2
GLUCOSE SERPL-MCNC: 197 MG/DL (ref 70–110)
HDLC SERPL-MCNC: 52 MG/DL (ref 40–75)
HDLC SERPL: 30.4 % (ref 20–50)
LDLC SERPL CALC-MCNC: 98.2 MG/DL (ref 63–159)
NONHDLC SERPL-MCNC: 119 MG/DL
POTASSIUM SERPL-SCNC: 4 MMOL/L (ref 3.5–5.1)
SODIUM SERPL-SCNC: 141 MMOL/L (ref 136–145)
TRIGL SERPL-MCNC: 104 MG/DL (ref 30–150)
TSH SERPL DL<=0.005 MIU/L-ACNC: 1 UIU/ML (ref 0.4–4)

## 2025-02-11 PROCEDURE — 83880 ASSAY OF NATRIURETIC PEPTIDE: CPT | Mod: HCNC | Performed by: STUDENT IN AN ORGANIZED HEALTH CARE EDUCATION/TRAINING PROGRAM

## 2025-02-11 PROCEDURE — 80048 BASIC METABOLIC PNL TOTAL CA: CPT | Mod: HCNC | Performed by: STUDENT IN AN ORGANIZED HEALTH CARE EDUCATION/TRAINING PROGRAM

## 2025-02-11 PROCEDURE — 84443 ASSAY THYROID STIM HORMONE: CPT | Mod: HCNC | Performed by: STUDENT IN AN ORGANIZED HEALTH CARE EDUCATION/TRAINING PROGRAM

## 2025-02-11 PROCEDURE — 36415 COLL VENOUS BLD VENIPUNCTURE: CPT | Mod: HCNC | Performed by: STUDENT IN AN ORGANIZED HEALTH CARE EDUCATION/TRAINING PROGRAM

## 2025-02-11 PROCEDURE — 80061 LIPID PANEL: CPT | Mod: HCNC | Performed by: STUDENT IN AN ORGANIZED HEALTH CARE EDUCATION/TRAINING PROGRAM

## 2025-02-11 NOTE — PROGRESS NOTES
12/22/22 1015   Hyperbaric Pre-Inspection   Mattress cleaned prior to treatment Yes   2 Patient Identifiers Verified Yes   Consent Obtained Yes   Cotton Gown Yes   Patient voided Yes   Drainage Bags Emptied Not applicable   Patient Pregnant Not applicable   Glasses, Jewelry, Makeup, etc. Removed Yes   Dentures, Hearing Aid, Prosthetic Devices Removed Yes   Touch hair to check for hair spray Yes   Cold/Flu Symptoms No   Diabetic Patient Eaten Yes   Medications Given Not applicable   Fears and apprehensions verbalized Yes   Ground Wire Secured Yes   HBO Treatment Course Details   Treatment Course Number 1   Ordering Provider Jennifer SCHILLING   Indications Diabetic wounds of lower extremities   HBO Treatment Start Date 12/19/22   HBO Treatment Details   Treatment Number 4   Inpatient Visit No   Total Treatment Length Calc (minutes) 113   Chamber Type Monoplace   Chamber # 2   HBO Dive Log # 1140   Treatment Protocol 2.0 SURI x 90 minutes w/100% oxygen and no air break   Treatment Details   Dive Rate Down 1.0 psi/minute   Dive Rate Up 2.0 psi/minute   Compress Begins 1 SURI   Clock Time 1037   Tx Pressure Reached 2 SURI   Clock Time 1052   Decompress Begins 2 SURI   Clock Time 1222   Decompress Ends 1 SURI   Clock Time 1230   Pre HBO Vital Signs   /68   Pulse 89   Resp 16   Temp 97.7 °F (36.5 °C)   Blood Glucose 261   Glucose Meter # wc   Pain Level 0   Post HBO Vital Signs   BP (!) 158/86   Pulse 84   Resp 16   Blood Glucose 211   Glucose Meter # wc   Pain Level 0   Ear Evaluation   Left Teed Scale Pre Grade I   Left Teed Scale Post Grade I   Right Teed Scale Pre Grade 0   Right Teed Scale Post Grade 0       Arrived awake and alert. ABC's intact. Ambulated without assistance. Cleared for treatment by Kira SCHILLING Tolerated treatment without complaints. Cleared from chamber without incidents. Follow uptomorrow for HBO.         ICD-10-CM ICD-9-CM   1. Chronic osteomyelitis of right foot  M86.671 730.17   2. Diabetic  Case Management/Social Work    Patient Name:  Abebe Crockett  YOB: 1944  MRN: [id number]  Admit Date:  2/9/2025        09:32 EST   Discharge Plan       Row Name 02/11/25 0931       Plan    Plan Patient plans to return home once medically ready. States no additional needs.                        Electronically signed by:  Grayson Weller RN  02/11/25 09:32 EST         ulcer of right foot associated with type 2 diabetes mellitus, with fat layer exposed  E11.621 250.80    L97.512 707.15   3. Acute on chronic osteomyelitis  M86.10 730.00    M86.60 730.10   4. Diabetic ulcer of right midfoot associated with type 2 diabetes mellitus, with bone involvement without evidence of necrosis  E11.621 250.80    L97.416 707.14        Physician Supervision  I provided direct supervision and was immediately available to furnish assistance and direction throughout the performance of the procedure.    Emergency Response Team  A trained emergency response team and emergency services were available throughout procedure.

## 2025-02-12 ENCOUNTER — HOSPITAL ENCOUNTER (OUTPATIENT)
Facility: HOSPITAL | Age: 55
Discharge: HOME | End: 2025-02-12
Attending: PODIATRIST | Admitting: PODIATRIST
Payer: MEDICARE

## 2025-02-12 ENCOUNTER — ANESTHESIA (OUTPATIENT)
Dept: SURGERY | Facility: HOSPITAL | Age: 55
End: 2025-02-12
Payer: MEDICARE

## 2025-02-12 VITALS
WEIGHT: 205 LBS | OXYGEN SATURATION: 97 % | HEIGHT: 73 IN | HEART RATE: 85 BPM | SYSTOLIC BLOOD PRESSURE: 118 MMHG | BODY MASS INDEX: 27.17 KG/M2 | DIASTOLIC BLOOD PRESSURE: 63 MMHG | TEMPERATURE: 98 F | RESPIRATION RATE: 20 BRPM

## 2025-02-12 DIAGNOSIS — M86.372 CHRONIC MULTIFOCAL OSTEOMYELITIS OF LEFT FOOT: ICD-10-CM

## 2025-02-12 DIAGNOSIS — E11.610 TYPE 2 DIABETES MELLITUS WITH CHARCOT JOINT ARTHROPATHY: Primary | ICD-10-CM

## 2025-02-12 DIAGNOSIS — Z98.890 POSTOPERATIVE STATE: ICD-10-CM

## 2025-02-12 DIAGNOSIS — S93.312S SUBLUXATION OF TARSAL JOINT OF FOOT, LEFT, SEQUELA: ICD-10-CM

## 2025-02-12 LAB
POCT GLUCOSE: 156 MG/DL (ref 70–110)
POCT GLUCOSE: 191 MG/DL (ref 70–110)
POCT GLUCOSE: 193 MG/DL (ref 70–110)

## 2025-02-12 PROCEDURE — 27201423 OPTIME MED/SURG SUP & DEVICES STERILE SUPPLY: Mod: HCNC | Performed by: PODIATRIST

## 2025-02-12 PROCEDURE — 25000003 PHARM REV CODE 250: Mod: HCNC

## 2025-02-12 PROCEDURE — 37000009 HC ANESTHESIA EA ADD 15 MINS: Mod: HCNC | Performed by: PODIATRIST

## 2025-02-12 PROCEDURE — 71000015 HC POSTOP RECOV 1ST HR: Mod: HCNC | Performed by: PODIATRIST

## 2025-02-12 PROCEDURE — C1769 GUIDE WIRE: HCPCS | Mod: HCNC | Performed by: PODIATRIST

## 2025-02-12 PROCEDURE — 71000033 HC RECOVERY, INTIAL HOUR: Mod: HCNC | Performed by: PODIATRIST

## 2025-02-12 PROCEDURE — 63600175 PHARM REV CODE 636 W HCPCS: Mod: HCNC

## 2025-02-12 PROCEDURE — 64445 NJX AA&/STRD SCIATIC NRV IMG: CPT | Mod: HCNC | Performed by: STUDENT IN AN ORGANIZED HEALTH CARE EDUCATION/TRAINING PROGRAM

## 2025-02-12 PROCEDURE — 64447 NJX AA&/STRD FEMORAL NRV IMG: CPT | Mod: HCNC | Performed by: STUDENT IN AN ORGANIZED HEALTH CARE EDUCATION/TRAINING PROGRAM

## 2025-02-12 PROCEDURE — 37000008 HC ANESTHESIA 1ST 15 MINUTES: Mod: HCNC | Performed by: PODIATRIST

## 2025-02-12 PROCEDURE — 36000709 HC OR TIME LEV III EA ADD 15 MIN: Mod: HCNC | Performed by: PODIATRIST

## 2025-02-12 PROCEDURE — 71000039 HC RECOVERY, EACH ADD'L HOUR: Mod: HCNC | Performed by: PODIATRIST

## 2025-02-12 PROCEDURE — 71000016 HC POSTOP RECOV ADDL HR: Mod: HCNC | Performed by: PODIATRIST

## 2025-02-12 PROCEDURE — 36000708 HC OR TIME LEV III 1ST 15 MIN: Mod: HCNC | Performed by: PODIATRIST

## 2025-02-12 PROCEDURE — 63600175 PHARM REV CODE 636 W HCPCS: Mod: HCNC | Performed by: STUDENT IN AN ORGANIZED HEALTH CARE EDUCATION/TRAINING PROGRAM

## 2025-02-12 PROCEDURE — C1713 ANCHOR/SCREW BN/BN,TIS/BN: HCPCS | Mod: HCNC | Performed by: PODIATRIST

## 2025-02-12 DEVICE — CAP G-BEAM HIGH COMPR SS: Type: IMPLANTABLE DEVICE | Site: FOOT | Status: FUNCTIONAL

## 2025-02-12 DEVICE — IMPLANTABLE DEVICE: Type: IMPLANTABLE DEVICE | Site: FOOT | Status: FUNCTIONAL

## 2025-02-12 RX ORDER — OXYCODONE HYDROCHLORIDE 5 MG/1
5 TABLET ORAL
Status: DISCONTINUED | OUTPATIENT
Start: 2025-02-12 | End: 2025-02-12 | Stop reason: HOSPADM

## 2025-02-12 RX ORDER — LIDOCAINE HYDROCHLORIDE 10 MG/ML
1 INJECTION, SOLUTION EPIDURAL; INFILTRATION; INTRACAUDAL; PERINEURAL ONCE
Status: DISCONTINUED | OUTPATIENT
Start: 2025-02-12 | End: 2025-02-12 | Stop reason: HOSPADM

## 2025-02-12 RX ORDER — FENTANYL CITRATE 50 UG/ML
INJECTION, SOLUTION INTRAMUSCULAR; INTRAVENOUS
Status: DISCONTINUED | OUTPATIENT
Start: 2025-02-12 | End: 2025-02-12

## 2025-02-12 RX ORDER — PROPOFOL 10 MG/ML
VIAL (ML) INTRAVENOUS
Status: DISCONTINUED | OUTPATIENT
Start: 2025-02-12 | End: 2025-02-12

## 2025-02-12 RX ORDER — HYDROMORPHONE HYDROCHLORIDE 2 MG/ML
INJECTION, SOLUTION INTRAMUSCULAR; INTRAVENOUS; SUBCUTANEOUS
Status: DISCONTINUED | OUTPATIENT
Start: 2025-02-12 | End: 2025-02-12

## 2025-02-12 RX ORDER — PROCHLORPERAZINE EDISYLATE 5 MG/ML
5 INJECTION INTRAMUSCULAR; INTRAVENOUS EVERY 30 MIN PRN
Status: DISCONTINUED | OUTPATIENT
Start: 2025-02-12 | End: 2025-02-12 | Stop reason: HOSPADM

## 2025-02-12 RX ORDER — EPHEDRINE SULFATE 50 MG/ML
INJECTION, SOLUTION INTRAVENOUS
Status: DISCONTINUED | OUTPATIENT
Start: 2025-02-12 | End: 2025-02-12

## 2025-02-12 RX ORDER — HYDROCODONE BITARTRATE AND ACETAMINOPHEN 5; 325 MG/1; MG/1
1 TABLET ORAL EVERY 4 HOURS PRN
Status: DISCONTINUED | OUTPATIENT
Start: 2025-02-12 | End: 2025-02-12 | Stop reason: HOSPADM

## 2025-02-12 RX ORDER — HYDROMORPHONE HYDROCHLORIDE 2 MG/ML
0.2 INJECTION, SOLUTION INTRAMUSCULAR; INTRAVENOUS; SUBCUTANEOUS EVERY 5 MIN PRN
Status: DISCONTINUED | OUTPATIENT
Start: 2025-02-12 | End: 2025-02-12 | Stop reason: HOSPADM

## 2025-02-12 RX ORDER — DEXAMETHASONE SODIUM PHOSPHATE 4 MG/ML
INJECTION, SOLUTION INTRA-ARTICULAR; INTRALESIONAL; INTRAMUSCULAR; INTRAVENOUS; SOFT TISSUE
Status: DISCONTINUED | OUTPATIENT
Start: 2025-02-12 | End: 2025-02-12

## 2025-02-12 RX ORDER — HYDROCODONE BITARTRATE AND ACETAMINOPHEN 5; 325 MG/1; MG/1
1 TABLET ORAL EVERY 4 HOURS PRN
Qty: 20 TABLET | Refills: 0 | Status: SHIPPED | OUTPATIENT
Start: 2025-02-12

## 2025-02-12 RX ORDER — BUPIVACAINE HYDROCHLORIDE 5 MG/ML
INJECTION, SOLUTION EPIDURAL; INTRACAUDAL
Status: COMPLETED | OUTPATIENT
Start: 2025-02-12 | End: 2025-02-12

## 2025-02-12 RX ORDER — MIDAZOLAM HYDROCHLORIDE 1 MG/ML
INJECTION INTRAMUSCULAR; INTRAVENOUS
Status: DISCONTINUED | OUTPATIENT
Start: 2025-02-12 | End: 2025-02-12

## 2025-02-12 RX ORDER — DEXMEDETOMIDINE HYDROCHLORIDE 100 UG/ML
INJECTION, SOLUTION INTRAVENOUS
Status: DISCONTINUED | OUTPATIENT
Start: 2025-02-12 | End: 2025-02-12

## 2025-02-12 RX ORDER — IPRATROPIUM BROMIDE AND ALBUTEROL SULFATE 2.5; .5 MG/3ML; MG/3ML
3 SOLUTION RESPIRATORY (INHALATION) EVERY 4 HOURS PRN
Status: DISCONTINUED | OUTPATIENT
Start: 2025-02-12 | End: 2025-02-12 | Stop reason: HOSPADM

## 2025-02-12 RX ORDER — ETOMIDATE 2 MG/ML
INJECTION INTRAVENOUS
Status: DISCONTINUED | OUTPATIENT
Start: 2025-02-12 | End: 2025-02-12

## 2025-02-12 RX ORDER — OXYCODONE HYDROCHLORIDE 5 MG/1
10 TABLET ORAL EVERY 4 HOURS PRN
Status: DISCONTINUED | OUTPATIENT
Start: 2025-02-12 | End: 2025-02-12 | Stop reason: HOSPADM

## 2025-02-12 RX ORDER — GLUCAGON 1 MG
1 KIT INJECTION
Status: DISCONTINUED | OUTPATIENT
Start: 2025-02-12 | End: 2025-02-12 | Stop reason: HOSPADM

## 2025-02-12 RX ORDER — VASOPRESSIN 20 [USP'U]/ML
INJECTION, SOLUTION INTRAMUSCULAR; SUBCUTANEOUS
Status: DISCONTINUED | OUTPATIENT
Start: 2025-02-12 | End: 2025-02-12

## 2025-02-12 RX ORDER — ROCURONIUM BROMIDE 10 MG/ML
INJECTION, SOLUTION INTRAVENOUS
Status: DISCONTINUED | OUTPATIENT
Start: 2025-02-12 | End: 2025-02-12

## 2025-02-12 RX ORDER — CEFAZOLIN SODIUM 1 G/3ML
INJECTION, POWDER, FOR SOLUTION INTRAMUSCULAR; INTRAVENOUS
Status: DISCONTINUED | OUTPATIENT
Start: 2025-02-12 | End: 2025-02-12

## 2025-02-12 RX ORDER — ACETAMINOPHEN 10 MG/ML
INJECTION, SOLUTION INTRAVENOUS
Status: DISCONTINUED | OUTPATIENT
Start: 2025-02-12 | End: 2025-02-12

## 2025-02-12 RX ORDER — CEFAZOLIN 2 G/1
2 INJECTION, POWDER, FOR SOLUTION INTRAMUSCULAR; INTRAVENOUS
Status: DISCONTINUED | OUTPATIENT
Start: 2025-02-12 | End: 2025-02-12 | Stop reason: HOSPADM

## 2025-02-12 RX ORDER — ONDANSETRON HYDROCHLORIDE 2 MG/ML
INJECTION, SOLUTION INTRAVENOUS
Status: DISCONTINUED | OUTPATIENT
Start: 2025-02-12 | End: 2025-02-12

## 2025-02-12 RX ORDER — PHENYLEPHRINE HYDROCHLORIDE 10 MG/ML
INJECTION INTRAVENOUS
Status: DISCONTINUED | OUTPATIENT
Start: 2025-02-12 | End: 2025-02-12

## 2025-02-12 RX ORDER — DOXYCYCLINE HYCLATE 100 MG
100 TABLET ORAL 2 TIMES DAILY
Qty: 28 TABLET | Refills: 0 | Status: SHIPPED | OUTPATIENT
Start: 2025-02-12

## 2025-02-12 RX ORDER — LIDOCAINE HYDROCHLORIDE 20 MG/ML
INJECTION INTRAVENOUS
Status: DISCONTINUED | OUTPATIENT
Start: 2025-02-12 | End: 2025-02-12

## 2025-02-12 RX ADMIN — PHENYLEPHRINE HYDROCHLORIDE 0.2 MCG/KG/MIN: 10 INJECTION INTRAVENOUS at 09:02

## 2025-02-12 RX ADMIN — EPHEDRINE SULFATE 10 MG: 50 INJECTION, SOLUTION INTRAMUSCULAR; INTRAVENOUS; SUBCUTANEOUS at 08:02

## 2025-02-12 RX ADMIN — HYDROMORPHONE HYDROCHLORIDE 0.4 MG: 2 INJECTION INTRAMUSCULAR; INTRAVENOUS; SUBCUTANEOUS at 10:02

## 2025-02-12 RX ADMIN — ONDANSETRON 4 MG: 2 INJECTION, SOLUTION INTRAMUSCULAR; INTRAVENOUS at 08:02

## 2025-02-12 RX ADMIN — CEFAZOLIN 2 G: 330 INJECTION, POWDER, FOR SOLUTION INTRAMUSCULAR; INTRAVENOUS at 08:02

## 2025-02-12 RX ADMIN — PROPOFOL 50 MG: 10 INJECTION, EMULSION INTRAVENOUS at 10:02

## 2025-02-12 RX ADMIN — EPHEDRINE SULFATE 10 MG: 50 INJECTION, SOLUTION INTRAMUSCULAR; INTRAVENOUS; SUBCUTANEOUS at 09:02

## 2025-02-12 RX ADMIN — SODIUM CHLORIDE: 0.9 INJECTION, SOLUTION INTRAVENOUS at 09:02

## 2025-02-12 RX ADMIN — DEXAMETHASONE SODIUM PHOSPHATE 4 MG: 4 INJECTION, SOLUTION INTRA-ARTICULAR; INTRALESIONAL; INTRAMUSCULAR; INTRAVENOUS; SOFT TISSUE at 08:02

## 2025-02-12 RX ADMIN — HUMAN INSULIN 4 UNITS: 100 INJECTION, SOLUTION SUBCUTANEOUS at 08:02

## 2025-02-12 RX ADMIN — FENTANYL CITRATE 25 MCG: 50 INJECTION INTRAMUSCULAR; INTRAVENOUS at 08:02

## 2025-02-12 RX ADMIN — ROCURONIUM BROMIDE 50 MG: 10 INJECTION, SOLUTION INTRAVENOUS at 08:02

## 2025-02-12 RX ADMIN — ETOMIDATE 8 MG: 2 INJECTION INTRAVENOUS at 08:02

## 2025-02-12 RX ADMIN — BUPIVACAINE HYDROCHLORIDE 15 ML: 5 INJECTION, SOLUTION EPIDURAL; INTRACAUDAL at 07:02

## 2025-02-12 RX ADMIN — PHENYLEPHRINE HYDROCHLORIDE 100 MCG: 10 INJECTION INTRAVENOUS at 08:02

## 2025-02-12 RX ADMIN — SODIUM CHLORIDE: 0.9 INJECTION, SOLUTION INTRAVENOUS at 08:02

## 2025-02-12 RX ADMIN — VASOPRESSIN 0.5 UNITS: 20 INJECTION INTRAVENOUS at 08:02

## 2025-02-12 RX ADMIN — DEXMEDETOMIDINE 8 MCG: 200 INJECTION, SOLUTION INTRAVENOUS at 08:02

## 2025-02-12 RX ADMIN — ROCURONIUM BROMIDE 10 MG: 10 INJECTION, SOLUTION INTRAVENOUS at 09:02

## 2025-02-12 RX ADMIN — ACETAMINOPHEN 1000 MG: 10 INJECTION, SOLUTION INTRAVENOUS at 08:02

## 2025-02-12 RX ADMIN — FENTANYL CITRATE 25 MCG: 50 INJECTION INTRAMUSCULAR; INTRAVENOUS at 09:02

## 2025-02-12 RX ADMIN — ROCURONIUM BROMIDE 20 MG: 10 INJECTION, SOLUTION INTRAVENOUS at 08:02

## 2025-02-12 RX ADMIN — GLYCOPYRROLATE 0.2 MG: 0.2 INJECTION, SOLUTION INTRAMUSCULAR; INTRAVITREAL at 08:02

## 2025-02-12 RX ADMIN — FENTANYL CITRATE 25 MCG: 50 INJECTION INTRAMUSCULAR; INTRAVENOUS at 10:02

## 2025-02-12 RX ADMIN — BUPIVACAINE HYDROCHLORIDE 30 ML: 5 INJECTION, SOLUTION EPIDURAL; INTRACAUDAL; PERINEURAL at 07:02

## 2025-02-12 RX ADMIN — MIDAZOLAM HYDROCHLORIDE 2 MG: 1 INJECTION, SOLUTION INTRAMUSCULAR; INTRAVENOUS at 07:02

## 2025-02-12 RX ADMIN — LIDOCAINE HYDROCHLORIDE 100 MG: 20 INJECTION, SOLUTION INTRAVENOUS at 08:02

## 2025-02-12 RX ADMIN — FENTANYL CITRATE 75 MCG: 50 INJECTION INTRAMUSCULAR; INTRAVENOUS at 08:02

## 2025-02-12 RX ADMIN — PROPOFOL 100 MG: 10 INJECTION, EMULSION INTRAVENOUS at 08:02

## 2025-02-12 RX ADMIN — PROPOFOL 50 MG: 10 INJECTION, EMULSION INTRAVENOUS at 08:02

## 2025-02-12 RX ADMIN — SUGAMMADEX 200 MG: 100 INJECTION, SOLUTION INTRAVENOUS at 10:02

## 2025-02-12 RX ADMIN — ONDANSETRON 4 MG: 2 INJECTION, SOLUTION INTRAMUSCULAR; INTRAVENOUS at 10:02

## 2025-02-12 NOTE — PLAN OF CARE
Discharge criteria met,voicing desire to go home. Discharge instructions given to patient & sister,verbalized understanding. Discharge home via wheelchair in care of his sister.

## 2025-02-12 NOTE — ANESTHESIA PROCEDURE NOTES
Left Popliteal Peripheral Block    Patient location during procedure: pre-op   Block not for primary anesthetic.  Reason for block: at surgeon's request and post-op pain management   Post-op Pain Location: Left foot   Start time: 2/12/2025 7:46 AM  Timeout: 2/12/2025 7:38 AM   End time: 2/12/2025 7:53 AM    Staffing  Authorizing Provider: Brooks Briggs MD  Performing Provider: Brooks Briggs MD    Staffing  Performed by: Brooks Briggs MD  Authorized by: Brooks Briggs MD    Preanesthetic Checklist  Completed: patient identified, IV checked, site marked, risks and benefits discussed, surgical consent, monitors and equipment checked, pre-op evaluation and timeout performed  Peripheral Block  Patient position: supine  Prep: ChloraPrep  Patient monitoring: heart rate, cardiac monitor, continuous pulse ox, continuous capnometry and frequent blood pressure checks  Block type: popliteal  Laterality: left  Injection technique: single shot  Needle  Needle type: Stimuplex   Needle gauge: 20 G  Needle length: 4 in  Needle localization: anatomical landmarks and ultrasound guidance   -ultrasound image captured on disc.  Assessment  Injection assessment: negative aspiration, negative parasthesia and local visualized surrounding nerve  Paresthesia pain: none  Heart rate change: no  Slow fractionated injection: yes    Medications:    Medications: bupivacaine (pf) (MARCAINE) injection 0.5% - Perineural   30 mL - 2/12/2025 7:49:00 AM    Additional Notes  VSS.  [facility] RN monitoring vitals throughout procedure.  Patient tolerated procedure well.     Lidocaine 1% 2 mL used for skin infiltration. With epinephrine 1:200,000.

## 2025-02-12 NOTE — OP NOTE
[facility] ([facility])  Operative Note      Date of Procedure: 2/12/2025     Procedure(s) (LRB):  REMOVAL, EXTERNAL FIXATION DEVICE (Left) lower extremity  Triple arthrodesis (Left) foot  Fusion of multiple midtarsal joints left foot  Application of below knee cast left lower extremity      Surgeons and Role:     * Daniel Reyes DPM - Primary    Assisting Surgeon: Ida Rudd DPM    Pre-Operative Diagnosis: Postoperative state [Z98.890]  Type 2 diabetes mellitus with Charcot joint arthropathy [E11.610]  Chronic multifocal osteomyelitis of left foot [M86.372]  Subluxation of tarsal joint of foot, left, sequela [S93.312S]    Post-Operative Diagnosis: Post-Op Diagnosis Codes:     * Postoperative state [Z98.890]     * Type 2 diabetes mellitus with Charcot joint arthropathy [E11.610]     * Chronic multifocal osteomyelitis of left foot [M86.372]     * Subluxation of tarsal joint of foot, left, sequela [S93.312S]    Anesthesia: General/Regional    Operative Findings (including complications, if any):   significantly improved alignment of left foot postop with limited left ankle range of motion as expected.     Description of Technical Procedures:   Patient was brought to the operating room on a stretcher and was transferred to the OR table in supine position. Anesthesia was induced.       While on the OR table, in a nonsterile field, the patient with an external fixation device on his L lower extremity was observed.  Using a  all the external fixation wires were cut flush with the skin on the side opposing the olive and left long on the side of the olive and then removed from the lower extremity.  No broken wires were noted. The external fixation device was safely removed from the L lower extremity.  Palpation of the leg and foot confirmed that all wires were removed in entirety.  No surrounding pin site erythema.  The tarsometatarsal joint site was noted to be stable utilizing stress test and  manipulation of the foot.  Instability was still noted at the ankle subtalar joint and it was determined that the STJ would be the only joint that needed to be prepped.  With the previous surgical intervention the talonavicular joint and calcaneocuboid joint regions were prepped in pinned.  Internal fixation has been employed today to further stabilize the foot and promote complete bone healing.      The L lower extremity was cleansed using Hibiclens and dried.  Tourniquet was applied to the L thigh.  The L lower extremity was draped and prepped in his manner.  Tourniquet was inflated to 300 mm Hg.    Attention was first directed to the lateral hindfoot inferior to the lateral malleolus where an 18 gauge needle was introduced into the posterior lateral facet of the subtalar joint and confirmed via fluoroscopy.  A #15 scalpel was then utilized to create a 1 cm linear incision through the skin down to subq followed by blunt dissection with a mosquito hemostat down to the articular surface.  The needle was removed and the Arthrex MIS bur was then introduced into the joint followed by aggressive joint prep which was also confirmed using fluoroscopy.  The 18 gauge needle was then utilized to identify the medial aspect of the subtalar joint using fluoroscopy.  Another short linear incision was made with sharp and blunt dissection down to the joint capsule.  The MIS bur was then introduced medially into the joint and utilized to further prep the subtalar joint.  A small Jacobson elevator was also introduced into the joint to further release the soft tissue and mobilize the subtalar joint.  There was an excessive amount of bleeding during the procedure therefore the tourniquet was released which helped control the blood loss.    Attention was than directed to the L posterior heel. A guidewire for a 7.0 mm compression cannulated screw was then directed from the posterior inferior aspect of the calcaneus across the subtalar joint  into the talus to maintain reduction.  The calcaneus was placed in slight eversion which was confirmed fluoroscopically on the calcaneal axial view.  A parallel guidewire was driven in a similar approach.  Adequate placement and angulation was confirmed via intraoperative fluoroscopy. Attention was then directed to the 1st ray with intention to insert a medial column beam in retrograde fashion. A guidewire was driven in retrograde fashion from the head of the 1st metatarsal and directed across the midfoot to the talus while holding the foot in a reduced position.     Another guidewire was then inserted at the distal plantar aspect between the 2nd and 3rd metatarsals and directed proximal catching the 3rd metatarsal base and driven proximal through previously created fusion site with the Bioactive glass into the calcaneus while maintaining the foot in a reduced position. Another guidewire was inserted utilizing fluoroscopic guidance from the base of the 2nd metatarsal dorsally and directed in a plantar proximal direction into the calcaneus.    After sequential drilling and countersinking, the subtalar joint was then permanently secured with a Arthrex 7.0 mm compression cannulated screw inserting the more posterior aspect of the subtalar joint followed by followed by another Arthrex 7.0 compression cannulated screw securing the area more anterior.  Adequate compression and reduction of the subtalar joint and uatrogenically prepped superior posterior calcaneus was noted on fluoroscopy.  The guidewire through the first metatarsal was not drilled due to the soft quality of the bone.  An Orthofix 7.4 x 140 mm G Beam screw was driven through the 1st metatarsal to the talus with aid of intraoperative fluorsoscopy. This had achieved compression of the arthrodesis site and adequate stability to the medial column.  An Orthofix 7.4 x 75 mm G Beam screw was driven and advanced into the lateral column utilizing the guidewire at  the distal plantar aspect between the 2nd and 3rd metatarsal using proper AO principles. An Orthofix 5.4 x 90 mm headless beam screw utilizing proper AO technique was then placed along the guidewire entering the 2nd metatarsal base and driven towards the calcaneus.    C-arm fluoroscopy confirmed adequate reduction and placement of screws and hardware.  With manual manipulation the ankle subtalar joint was noted to be stable with limited range of motion.    Reaproximation of skin with 4-0 Prolene. All incisions were dressed with xeroform, gauze, cast padding and ACE bandage in a Emery compression dressing. A fiberglass below-knee cast was then applied to the left  lower extremity with the ankle joint held in neutral position. The cast was bivalved.    The patient tolerated the procedure and anesthesia well. Patient was transferred to the recovery room with vital signs stable, vascular status intact, and capillary refill time <3 seconds to the distal L foot. Following a period of post-op monitoring, the patient will be discharged home on the following written and oral post-op instructions:     - Keep dressing dry and intact until clinic visit.  - Avoid excessive ambulation, ambulate with surgical shoe on at all times with partial weightbearing as tolerated  - Ice and elevate L foot when at rest.  - All prescriptions were given to patient pre and post-op.  - Contact Dr. Reyes for all post op follow up care and if any problems arise.    Estimated Blood Loss (EBL): 50 mL           Implants:   Implant Name Type Inv. Item Serial No.  Lot No. LRB No. Used Action   WIRE TRUELOK BAYONET [id number] - [id number]  WIRE TRUELOK BAYONET [id number]  ORTHOFIX  Left 8 Explanted   GUIDEWIRE LARGE STRL 1.9MM - [id number]  GUIDEWIRE LARGE STRL 1.9MM   [id number] Left 1 Implanted and Explanted   GUIDEWIRE LARGE STRL 1.9MM - [id number]  GUIDEWIRE LARGE STRL 1.9MM   [id number] Left 1 Implanted and Explanted   GUIDEWIRE LARGE STRL  2.8MM - [id number]  GUIDEWIRE LARGE STRL 2.8MM   [id number] Left 1 Implanted and Explanted   GUIDEWIRE LARGE STRL 2.8MM - [id number]  GUIDEWIRE LARGE STRL 2.8MM   [id number] Left 1 Implanted and Explanted   CAP G-BEAM HIGH COMPR SS - [id number]  CAP G-BEAM HIGH COMPR SS   [id number] Left 2 Implanted   GUIDEWIRE LARGE STRL 2.8MM - [id number]  GUIDEWIRE LARGE STRL 2.8MM   [id number] Left 1 Implanted and Explanted   2.4 K-WIRE      Left 2 Implanted and Explanted   HEADLESS COMP SCREW      Left 1 Implanted   HEADLESS COMP SCREW      Left 1 Implanted   G-BEAM STAINLESS STEEL FUSION BEAM L75MM D7.4MM STERILE     [id number] Left 1 Implanted   G-BEAM STAINLESS STEEL FUSION BEAM L140MM  D7.4MM STERILE     [id number] Left 1 Implanted   BEAM G-BEAM SS FUSION 5.4X90MM - [id number]  BEAM G-BEAM SS FUSION 5.4X90MM   [id number] Left 1 Implanted       Specimens:   Specimen (24h ago, onward)      None           Condition: Good    Disposition: PACU - hemodynamically stable.    Attestation: I was present and scrubbed for the entire procedure.    Discharge Note    OUTCOME: Patient tolerated treatment/procedure well without complication and is now ready for discharge.    DISPOSITION: Home or Self Care    FINAL DIAGNOSIS:  Type 2 diabetes mellitus with Charcot joint arthropathy    FOLLOWUP: In clinic    DISCHARGE INSTRUCTIONS:    Discharge Procedure Orders   Diet general     Keep surgical extremity elevated     Call MD for:  temperature >100.4     Call MD for:  persistent nausea and vomiting     Call MD for:  severe uncontrolled pain     Call MD for:  difficulty breathing, headache or visual disturbances     Leave dressing on - Keep it clean, dry, and intact until clinic visit     Weight bearing restrictions (specify)   Order Comments: Non-weightbearing left foot     I directly supervised the above resident and was scrubbed for the entire surgical case.  Daniel Reyes DPM, FACFAS

## 2025-02-12 NOTE — ANESTHESIA PROCEDURE NOTES
Left Adductor Canal Peripheral Block    Patient location during procedure: pre-op   Block not for primary anesthetic.  Reason for block: at surgeon's request and post-op pain management   Post-op Pain Location: Left foot   Start time: 2/12/2025 7:46 AM  Timeout: 2/12/2025 7:38 AM   End time: 2/12/2025 7:53 AM    Staffing  Authorizing Provider: Brooks Briggs MD  Performing Provider: Brooks Briggs MD    Staffing  Performed by: Brooks Briggs MD  Authorized by: Brooks Briggs MD    Preanesthetic Checklist  Completed: patient identified, IV checked, site marked, risks and benefits discussed, surgical consent, monitors and equipment checked, pre-op evaluation and timeout performed  Peripheral Block  Patient position: supine  Prep: ChloraPrep  Patient monitoring: heart rate, cardiac monitor, continuous pulse ox, continuous capnometry and frequent blood pressure checks  Block type: adductor canal  Laterality: left  Injection technique: single shot  Needle  Needle type: Stimuplex   Needle gauge: 20 G  Needle length: 4 in  Needle localization: anatomical landmarks and ultrasound guidance   -ultrasound image captured on disc.  Assessment  Injection assessment: negative aspiration, negative parasthesia and local visualized surrounding nerve  Paresthesia pain: none  Heart rate change: no  Slow fractionated injection: yes    Medications:    Medications: bupivacaine (pf) (MARCAINE) injection 0.5% - Perineural   15 mL - 2/12/2025 7:52:00 AM    Additional Notes  VSS.  [facility] RN monitoring vitals throughout procedure.  Patient tolerated procedure well.     Lidocaine 1% 2 mL used for skin infiltration.

## 2025-02-12 NOTE — ANESTHESIA PROCEDURE NOTES
Intubation    Date/Time: 2/12/2025 8:06 AM    Performed by: Will Agrawal CRNA  Authorized by: Brooks Briggs MD    Intubation:     Induction:  Intravenous    Intubated:  Postinduction    Mask Ventilation:  Easy with oral airway    Attempts:  1    Attempted By:  Student and CRNA    Method of Intubation:  Direct    Blade:  Mayito 3    Laryngeal View Grade: Grade IIA - cords partially seen      Difficult Airway Encountered?: No      Complications:  None    Airway Device:  Oral endotracheal tube    Airway Device Size:  7.5    Style/Cuff Inflation:  Cuffed (inflated to minimal occlusive pressure)    Tube secured:  23    Secured at:  The lips    Placement Verified By:  Capnometry    Complicating Factors:  None    Findings Post-Intubation:  BS equal bilateral and atraumatic/condition of teeth unchanged

## 2025-02-12 NOTE — ANESTHESIA POSTPROCEDURE EVALUATION
Anesthesia Post Evaluation    Patient: Indio Ryder Jr.    Procedure(s) Performed: Procedure(s) (LRB):  REMOVAL, EXTERNAL FIXATION DEVICE (Left)  FUSION, FOOT, multiple joints (Left)    Final Anesthesia Type: general      Patient location during evaluation: PACU  Patient participation: Yes- Able to Participate  Level of consciousness: awake  Post-procedure vital signs: reviewed and stable  Pain management: adequate  Airway patency: patent    PONV status at discharge: No PONV  Anesthetic complications: no      Cardiovascular status: blood pressure returned to baseline  Respiratory status: unassisted  Hydration status: euvolemic  Follow-up not needed.              Vitals Value Taken Time   /60 02/12/25 1240   Temp 36.6 °C (97.9 °F) 02/12/25 1210   Pulse 71 02/12/25 1240   Resp 20 02/12/25 1240   SpO2 95 % 02/12/25 1240         Event Time   Out of Recovery 12:09:23         Pain/Jenny Score: Jenny Score: 9 (2/12/2025 12:10 PM)

## 2025-02-12 NOTE — BRIEF OP NOTE
[place] - Surgery ([facility])  Brief Operative Note    Surgery Date: 2/12/2025     Surgeons and Role:     * Daniel Reyes DPM - Primary    Assisting Surgeon: Ida Dowd DPM PGY 2    Pre-op Diagnosis:  Postoperative state [Z98.890]  Type 2 diabetes mellitus with Charcot joint arthropathy [E11.610]  Chronic multifocal osteomyelitis of left foot [M86.372]  Subluxation of tarsal joint of foot, left, sequela [S93.312S]    Post-op Diagnosis:  Post-Op Diagnosis Codes:     * Postoperative state [Z98.890]     * Type 2 diabetes mellitus with Charcot joint arthropathy [E11.610]     * Chronic multifocal osteomyelitis of left foot [M86.372]     * Subluxation of tarsal joint of foot, left, sequela [S93.312S]    Procedure(s) (LRB):  REMOVAL, EXTERNAL FIXATION DEVICE (Left) lower extremity  Triple arthrodesis (Left) foot  Fusion of multiple midtarsal joints left foot  Application of below knee cast left lower extremity     Anesthesia: General/Regional    Operative Findings: significantly improved alignment of left foot postop with limited left ankle range of motion as expected.     Estimated Blood Loss: 50 mL         Specimens:   Specimen (24h ago, onward)      None              Discharge Note    OUTCOME: Patient tolerated treatment/procedure well without complication and is now ready for discharge.    DISPOSITION: Home or Self Care    FINAL DIAGNOSIS:  Type 2 diabetes mellitus with Charcot joint arthropathy    FOLLOWUP: In clinic    DISCHARGE INSTRUCTIONS:    Discharge Procedure Orders   Diet general     Keep surgical extremity elevated     Call MD for:  temperature >100.4     Call MD for:  persistent nausea and vomiting     Call MD for:  severe uncontrolled pain     Call MD for:  difficulty breathing, headache or visual disturbances     Leave dressing on - Keep it clean, dry, and intact until clinic visit     Weight bearing restrictions (specify)   Order Comments: Non-weightbearing left foot

## 2025-02-12 NOTE — TRANSFER OF CARE
"Anesthesia Transfer of Care Note    Patient: Indio Ryder Jr.    Procedure(s) Performed: Procedure(s) (LRB):  REMOVAL, EXTERNAL FIXATION DEVICE (Left)  FUSION, FOOT, multiple joints (Left)    Patient location: PACU    Anesthesia Type: general    Transport from OR: Transported from OR on 6-10 L/min O2 by face mask with adequate spontaneous ventilation    Post pain: adequate analgesia    Post assessment: no apparent anesthetic complications and tolerated procedure well    Post vital signs: stable    Level of consciousness: sedated and responds to stimulation    Nausea/Vomiting: no nausea/vomiting    Complications: none    Transfer of care protocol was followed      Last vitals: Visit Vitals  BP 97/77   Pulse 90   Temp 36.7 °C (98.1 °F) (Skin)   Resp 12   Ht 6' 1" (1.854 m)   Wt 93 kg (205 lb)   SpO2 100%   BMI 27.05 kg/m²     "

## 2025-02-14 ENCOUNTER — TELEPHONE (OUTPATIENT)
Dept: PODIATRY | Facility: CLINIC | Age: 55
End: 2025-02-14
Payer: MEDICARE

## 2025-02-14 NOTE — TELEPHONE ENCOUNTER
----- Message from Guadalupe sent at 2/14/2025 10:04 AM CST -----  Type:  Call    Who Called:Alberta/[facility]  Does the patient know what this is regarding?:Pt  Would the patient rather a call back or a response via [facility]? call  Best Call Back Number:[phone]  Additional Information: Do the doctor want them to pt on held for home health or discharge.  He has a cast on his leg.

## 2025-02-14 NOTE — TELEPHONE ENCOUNTER
Spoke with Alberta with [facility] to inform her that Mr Ryder has an appt with Dr Reyes on Tues,2/18/25 and that if possible place the pt on a temporary hold until we assess him on Tuesday to ensure that home health services are no longer needed. Per Alberta that is fine and she will check back next Weds. Will send new home health orders after pt is seen next week.

## 2025-02-18 ENCOUNTER — TELEPHONE (OUTPATIENT)
Dept: PODIATRY | Facility: CLINIC | Age: 55
End: 2025-02-18
Payer: MEDICARE

## 2025-02-18 NOTE — TELEPHONE ENCOUNTER
Called patient to see why he did not show up for his appt today because he is in post op?  He stated he did not get a notice and was coming in on 03/11/25.  I scheduled him for 02/20/25 @ 2:45.  He will come in 15 minutes sooner for an x-ray.   Patient has no more questions at this time.     aZira

## 2025-02-19 ENCOUNTER — DOCUMENT SCAN (OUTPATIENT)
Dept: HOME HEALTH SERVICES | Facility: HOSPITAL | Age: 55
End: 2025-02-19
Payer: MEDICARE

## 2025-02-20 ENCOUNTER — OFFICE VISIT (OUTPATIENT)
Dept: PODIATRY | Facility: CLINIC | Age: 55
End: 2025-02-20
Payer: MEDICARE

## 2025-02-20 VITALS — SYSTOLIC BLOOD PRESSURE: 108 MMHG | DIASTOLIC BLOOD PRESSURE: 70 MMHG | HEART RATE: 87 BPM

## 2025-02-20 DIAGNOSIS — Z98.890 POSTOPERATIVE STATE: Primary | ICD-10-CM

## 2025-02-20 DIAGNOSIS — E11.610 TYPE 2 DIABETES MELLITUS WITH CHARCOT JOINT ARTHROPATHY: ICD-10-CM

## 2025-02-20 NOTE — PROGRESS NOTES
Subjective:     Patient ID: Indio Ryder Jr. is a 54 y.o. male.    Chief Complaint: Diabetic Foot Ulcer, surgical consult    Indio is a 54 y.o. male who presents to the clinic for evaluation and treatment of high risk feet. 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), Colon adenocarcinoma (04/12/2022), Diabetic ketoacidosis without coma associated with type 2 diabetes mellitus (05/30/2017), Diabetic ulcer of right foot associated with type 2 diabetes mellitus (06/03/2015), Disorder of kidney and ureter, Essential hypertension (06/06/2013), Group B streptococcal infection (01/13/2017), Mixed hyperlipidemia (08/12/2014), Septic arthritis of left foot (04/28/2021), Shoulder impingement (08/12/2014), Subacute osteomyelitis of right foot (01/12/2017), Traumatic amputation of fifth toe of right foot (07/02/2015), and Type 2 diabetes mellitus with diabetic neuropathy, with long-term current use of insulin (05/03/2016). The patient's chief complaint is foot ulcer along the left plantar midfoot.  Patient had been seeing Dr. Peterson in the [facility] on a biweekly basis for management of his left foot ulceration. He presents today for surgical consultation, evaluation for rearfoot fusion in regards to his left foot and ankle.  His last A1c was obtained in April, greater than 14.0.  Patient has had extensive pedal surgical history, last having had a debridement per Dr. Peterson in July of 2023. Patient had received a custom ankle brace in January and states that he subsequently had developed an ulceration as a result of it; previous to this, the ulceration of his left midfoot had healed over.     09/16/2024:  Follow-up from updated weight-bearing left foot and ankle x-ray imaging and lab work.  Seen per  in wound clinic on weekly basis.  Ambulating with cam boot.  States he is in the process of adjusting his medications due to uncontrolled hyperglycemia.    11/11/2024:  Returns for  re-evaluation of progressive varus deformity to left foot and chronic ulceration followed weekly at Wound Care.  States his hemoglobin A1c improved and he is monitoring his continuous glucose monitor noting that his daily blood sugar values improved a lot since the last A1c was taken on 10/16/2024 had shown value of 11%.  Denies any pain.  Ambulating with tall Cam boot.  States he change his diet.    11/22/2024:  Returns to discuss CT results.  Seen weekly in [facility]ound Care.    12/16/2024:  Returns for wound check.  Three weeks postop.  Followed by [facility].  Tolerating IV cefepime well.  Seen per .  Nonweightbearing left lower extremity.    01/14/2025:  Presents for wound check.  Relates antibiotic therapy completed.  PICC line removed.  Denies any slips, trips or falls.  No new complaints.  Accompanied by his sister.    02/04/2025: Returns for wound check.  Scheduled for staged surgical intervention for arthrodesis of the left foot and removal of the external fixation next week on 02/12/2025.  Complains of the new onset wound to the left 3rd toe that began a couple weeks ago and was treated by [facility].  Believes the Ace wrap may have been too tight and caused a pressure wound.  Denies any trauma.    02/20/2025:  Post removal multiplane external fixation left lower extremity with arthrodesis multiple midtarsal and tarsal joints left foot on 02/12/2025.  No pain reported.  Denies any slips, trips or falls.  Utilizing rolling knee scooter.  Cast intact.    PCP: Lorena Thakur MD      Current shoe gear:  Affected Foot: Surgical boot, rolling knee scooter     Unaffected Foot: Extra depth shoes    Hemoglobin A1C   Date Value Ref Range Status   01/02/2025 7.5 (H) 4.0 - 5.6 % Final     Comment:     ADA Screening Guidelines:  5.7-6.4%  Consistent with prediabetes  >or=6.5%  Consistent with diabetes    High levels of fetal hemoglobin interfere with the HbA1C  assay. Heterozygous hemoglobin variants  (HbS, HgC, etc)do  not significantly interfere with this assay.   However, presence of multiple variants may affect accuracy.     10/16/2024 11.0 (H) 4.0 - 5.6 % Final     Comment:     ADA Screening Guidelines:  5.7-6.4%  Consistent with prediabetes  >or=6.5%  Consistent with diabetes    High levels of fetal hemoglobin interfere with the HbA1C  assay. Heterozygous hemoglobin variants (HbS, HgC, etc)do  not significantly interfere with this assay.   However, presence of multiple variants may affect accuracy.     08/08/2024 >14.0 (H) 4.0 - 5.6 % Final     Comment:     ADA Screening Guidelines:  5.7-6.4%  Consistent with prediabetes  >or=6.5%  Consistent with diabetes    High levels of fetal hemoglobin interfere with the HbA1C  assay. Heterozygous hemoglobin variants (HbS, HgC, etc)do  not significantly interfere with this assay.   However, presence of multiple variants may affect accuracy.         Review of Systems   Constitutional: Negative for chills and fever.   HENT:  Negative for congestion.    Eyes: Negative.    Cardiovascular:  Negative for chest pain.   Respiratory:  Negative for cough and shortness of breath.    Skin:  Positive for dry skin.   Musculoskeletal:  Negative for back pain and joint pain.   Gastrointestinal:  Negative for nausea and vomiting.   Neurological:  Positive for numbness.   Psychiatric/Behavioral:  Negative for altered mental status.         Objective:     Physical Exam  Vitals reviewed.   Constitutional:       General: He is not in acute distress.     Appearance: He is not ill-appearing.   Cardiovascular:      Comments: Capillary refill greater than 3 seconds to left digits.     Pedal pulses heard only through Doppler bilateral:    Triphasic PT, monophasic DP left   Monophasic PT, monophasic DP right  Musculoskeletal:         General: No swelling or tenderness.      Right lower leg: No edema.      Left lower leg: No edema.      Comments: Amputated left 5th ray.  Improved clinical  alignment to left foot.  Localized edema to left lower extremity.  No palpable fluctuance or crepitance.  No localized pain on palpation.  Left ankle range motion is reduced.   Feet:      Right foot:      Protective Sensation: 10 sites tested.  0 sites sensed.      Skin integrity: Ulcer present.      Left foot:      Protective Sensation: 10 sites tested.  0 sites sensed.   Skin:     General: Skin is warm and dry.      Findings: No ecchymosis or erythema.      Nails: There is no clubbing.      Comments: Incision sites left foot well-approximated suture.  No gapping, drainage or signs infection noted.    Wound site dorsal left 3rd toe with 100% dry granular tissue.  No acute signs infection noted.   Neurological:      Mental Status: He is alert and oriented to person, place, and time.      Cranial Nerves: No cranial nerve deficit.      Sensory: Sensory deficit present.      Comments: 0/10 [place]man-Nenita monofilament bilateral   Psychiatric:         Mood and Affect: Mood normal.         Assessment:      Encounter Diagnoses   Name Primary?    Postoperative state Yes    Type 2 diabetes mellitus with Charcot joint arthropathy      Plan:     Indio was seen today for post-op evaluation.    Diagnoses and all orders for this visit:    Postoperative state  -     X-Ray Foot Complete Left; Future    Type 2 diabetes mellitus with Charcot joint arthropathy  -     X-Ray Foot Complete Left; Future      I counseled the patient on his conditions, their implications and medical management.    Cast and dressing to left lower extremity removed.  Left lower extremity cleansed Hibiclens scrub.  Applied Hydrofera ready blue over the wound site dorsal left 3rd toe followed by Profore multilayer compressive dressing from the forefoot to proximal leg.  Well-padded fiberglass total contact cast utilizing true cast kit applied to left lower extremity maintain the left foot 90° to the leg.    Continue elevation at rest.    Continue  nonweightbearing to left lower extremity however if needed could apply light pressure for transfers.    RTC within 3 weeks for cast removal.  Follow-up x-ray at that time.     Assisted by Joshua Rudd DPM PGY 2    A portion of this note was generated by voice recognition software and may contain spelling and grammar errors.

## 2025-02-21 DIAGNOSIS — Z00.00 ENCOUNTER FOR MEDICARE ANNUAL WELLNESS EXAM: ICD-10-CM

## 2025-03-11 ENCOUNTER — OFFICE VISIT (OUTPATIENT)
Dept: PODIATRY | Facility: CLINIC | Age: 55
End: 2025-03-11
Payer: MEDICARE

## 2025-03-11 ENCOUNTER — HOSPITAL ENCOUNTER (OUTPATIENT)
Dept: RADIOLOGY | Facility: HOSPITAL | Age: 55
Discharge: HOME | End: 2025-03-11
Attending: PODIATRIST
Payer: MEDICARE

## 2025-03-11 VITALS
BODY MASS INDEX: 27.17 KG/M2 | WEIGHT: 205 LBS | DIASTOLIC BLOOD PRESSURE: 52 MMHG | HEART RATE: 112 BPM | SYSTOLIC BLOOD PRESSURE: 84 MMHG | HEIGHT: 73 IN

## 2025-03-11 DIAGNOSIS — Z98.890 POSTOPERATIVE STATE: Primary | ICD-10-CM

## 2025-03-11 DIAGNOSIS — Z98.890 POSTOPERATIVE STATE: ICD-10-CM

## 2025-03-11 DIAGNOSIS — E11.610 TYPE 2 DIABETES MELLITUS WITH CHARCOT JOINT ARTHROPATHY: ICD-10-CM

## 2025-03-11 PROCEDURE — 73630 X-RAY EXAM OF FOOT: CPT | Mod: TC,HCNC,PN,LT

## 2025-03-11 PROCEDURE — 99999 PR PBB SHADOW E&M-EST. PATIENT-LVL IV: CPT | Mod: PBBFAC,HCNC,, | Performed by: PODIATRIST

## 2025-03-11 PROCEDURE — 73630 X-RAY EXAM OF FOOT: CPT | Mod: 26,HCNC,LT, | Performed by: RADIOLOGY

## 2025-03-11 NOTE — PROGRESS NOTES
Subjective:     Patient ID: Indio Ryder Jr. is a 54 y.o. male.    Chief Complaint: Diabetic Foot Ulcer, surgical consult    Indio is a 54 y.o. male who presents to the clinic for evaluation and treatment of high risk feet. 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), Colon adenocarcinoma (04/12/2022), Diabetic ketoacidosis without coma associated with type 2 diabetes mellitus (05/30/2017), Diabetic ulcer of right foot associated with type 2 diabetes mellitus (06/03/2015), Disorder of kidney and ureter, Essential hypertension (06/06/2013), Group B streptococcal infection (01/13/2017), Mixed hyperlipidemia (08/12/2014), Septic arthritis of left foot (04/28/2021), Shoulder impingement (08/12/2014), Subacute osteomyelitis of right foot (01/12/2017), Traumatic amputation of fifth toe of right foot (07/02/2015), and Type 2 diabetes mellitus with diabetic neuropathy, with long-term current use of insulin (05/03/2016). The patient's chief complaint is foot ulcer along the left plantar midfoot.  Patient had been seeing Dr. Peterson in the [facility] on a biweekly basis for management of his left foot ulceration. He presents today for surgical consultation, evaluation for rearfoot fusion in regards to his left foot and ankle.  His last A1c was obtained in April, greater than 14.0.  Patient has had extensive pedal surgical history, last having had a debridement per Dr. Peterson in July of 2023. Patient had received a custom ankle brace in January and states that he subsequently had developed an ulceration as a result of it; previous to this, the ulceration of his left midfoot had healed over.     09/16/2024:  Follow-up from updated weight-bearing left foot and ankle x-ray imaging and lab work.  Seen per  in wound clinic on weekly basis.  Ambulating with cam boot.  States he is in the process of adjusting his medications due to uncontrolled hyperglycemia.    11/11/2024:  Returns for  re-evaluation of progressive varus deformity to left foot and chronic ulceration followed weekly at Wound Care.  States his hemoglobin A1c improved and he is monitoring his continuous glucose monitor noting that his daily blood sugar values improved a lot since the last A1c was taken on 10/16/2024 had shown value of 11%.  Denies any pain.  Ambulating with tall Cam boot.  States he change his diet.    11/22/2024:  Returns to discuss CT results.  Seen weekly in [facility] Care.    12/16/2024:  Returns for wound check.  Three weeks postop.  Followed by [facility].  Tolerating IV cefepime well.  Seen per .  Nonweightbearing left lower extremity.    01/14/2025:  Presents for wound check.  Relates antibiotic therapy completed.  PICC line removed.  Denies any slips, trips or falls.  No new complaints.  Accompanied by his sister.    02/04/2025: Returns for wound check.  Scheduled for staged surgical intervention for arthrodesis of the left foot and removal of the external fixation next week on 02/12/2025.  Complains of the new onset wound to the left 3rd toe that began a couple weeks ago and was treated by [facility].  Believes the Ace wrap may have been too tight and caused a pressure wound.  Denies any trauma.    02/20/2025:  Post removal multiplane external fixation left lower extremity with arthrodesis multiple midtarsal and tarsal joints left foot on 02/12/2025.  No pain reported.  Denies any slips, trips or falls.  Utilizing rolling knee scooter.  Cast intact.    03/11/2025:  1 month postop.  Denies any slips, trips or falls.  Cast intact.  No pain reported.  No new concerns.    PCP: Lorena Thakur MD      Current shoe gear:  Affected Foot: Surgical boot, rolling knee scooter     Unaffected Foot: Extra depth shoes    Hemoglobin A1C   Date Value Ref Range Status   01/02/2025 7.5 (H) 4.0 - 5.6 % Final     Comment:     ADA Screening Guidelines:  5.7-6.4%  Consistent with prediabetes  >or=6.5%  Consistent  with diabetes    High levels of fetal hemoglobin interfere with the HbA1C  assay. Heterozygous hemoglobin variants (HbS, HgC, etc)do  not significantly interfere with this assay.   However, presence of multiple variants may affect accuracy.     10/16/2024 11.0 (H) 4.0 - 5.6 % Final     Comment:     ADA Screening Guidelines:  5.7-6.4%  Consistent with prediabetes  >or=6.5%  Consistent with diabetes    High levels of fetal hemoglobin interfere with the HbA1C  assay. Heterozygous hemoglobin variants (HbS, HgC, etc)do  not significantly interfere with this assay.   However, presence of multiple variants may affect accuracy.     08/08/2024 >14.0 (H) 4.0 - 5.6 % Final     Comment:     ADA Screening Guidelines:  5.7-6.4%  Consistent with prediabetes  >or=6.5%  Consistent with diabetes    High levels of fetal hemoglobin interfere with the HbA1C  assay. Heterozygous hemoglobin variants (HbS, HgC, etc)do  not significantly interfere with this assay.   However, presence of multiple variants may affect accuracy.         Review of Systems   Constitutional: Negative for chills and fever.   HENT:  Negative for congestion.    Eyes: Negative.    Cardiovascular:  Negative for chest pain.   Respiratory:  Negative for cough and shortness of breath.    Skin:  Positive for dry skin.   Musculoskeletal:  Negative for back pain and joint pain.   Gastrointestinal:  Negative for nausea and vomiting.   Neurological:  Positive for numbness.   Psychiatric/Behavioral:  Negative for altered mental status.         Objective:     Physical Exam  Vitals reviewed.   Constitutional:       General: He is not in acute distress.     Appearance: He is not ill-appearing.   Cardiovascular:      Comments: Capillary refill greater than 3 seconds to left digits.     Pedal pulses heard only through Doppler bilateral:    Triphasic PT, monophasic DP left   Monophasic PT, monophasic DP right  Musculoskeletal:         General: No swelling or tenderness.      Right  lower leg: No edema.      Left lower leg: No edema.      Comments: Amputated left 5th ray.  Improved clinical alignment to left foot.  Localized edema to left lower extremity.  No palpable fluctuance or crepitance.  No localized pain on palpation.  Left ankle range motion is reduced.   Feet:      Right foot:      Protective Sensation: 10 sites tested.  0 sites sensed.      Skin integrity: Ulcer present.      Left foot:      Protective Sensation: 10 sites tested.  0 sites sensed.   Skin:     General: Skin is warm and dry.      Findings: No ecchymosis or erythema.      Nails: There is no clubbing.      Comments: All prior incisions are healed with intact suture.    Previous wound sites of the dorsal left 3rd toes complete healed with normal-appearing skin.   Neurological:      Mental Status: He is alert and oriented to person, place, and time.      Cranial Nerves: No cranial nerve deficit.      Sensory: Sensory deficit present.      Comments: 0/10 [place]-Nenita monofilament bilateral   Psychiatric:         Mood and Affect: Mood normal.         Assessment:      Encounter Diagnoses   Name Primary?    Postoperative state Yes    Type 2 diabetes mellitus with Charcot joint arthropathy      Plan:     Indio was seen today for post-op evaluation.    Diagnoses and all orders for this visit:    Postoperative state  -     X-Ray Foot Complete Left; Future    Type 2 diabetes mellitus with Charcot joint arthropathy  -     X-Ray Foot Complete Left; Future      I counseled the patient on his conditions, their implications and medical management.    Cast and dressing to left lower extremity removed.  Left lower extremity cleansed Hibiclens scrub.  Sutures removed.  No dehiscence noted.  Applied coflex with calamine from the forefoot to proximal leg.  Well-padded fiberglass total contact cast utilizing true cast kit applied to left lower extremity maintain the left foot 90° to the leg.    Continue elevation at rest.    Continue  nonweightbearing to left lower extremity however if needed could apply light pressure for transfers.    RTC within 4 weeks for cast removal.  Follow-up x-ray at that time.    Assisted per Vishnu Lewis DPM PGY 2    A portion of this note was generated by voice recognition software and may contain spelling and grammar errors.

## 2025-03-21 ENCOUNTER — EXTERNAL HOME HEALTH (OUTPATIENT)
Dept: HOME HEALTH SERVICES | Facility: HOSPITAL | Age: 55
End: 2025-03-21
Payer: MEDICARE

## 2025-03-21 ENCOUNTER — HOSPITAL ENCOUNTER (OUTPATIENT)
Dept: CARDIOLOGY | Facility: HOSPITAL | Age: 55
Discharge: HOME | End: 2025-03-21
Attending: STUDENT IN AN ORGANIZED HEALTH CARE EDUCATION/TRAINING PROGRAM
Payer: MEDICARE

## 2025-03-21 VITALS
WEIGHT: 205.31 LBS | BODY MASS INDEX: 27.21 KG/M2 | HEART RATE: 84 BPM | DIASTOLIC BLOOD PRESSURE: 68 MMHG | HEIGHT: 73 IN | SYSTOLIC BLOOD PRESSURE: 115 MMHG

## 2025-03-21 DIAGNOSIS — I50.22 HEART FAILURE WITH MILDLY REDUCED EJECTION FRACTION: ICD-10-CM

## 2025-03-21 LAB
ASCENDING AORTA: 3.39 CM
AV AREA BY CONTINUOUS VTI: 4.2 CM2
AV INDEX (PROSTH): 0.8
AV LVOT MEAN GRADIENT: 1 MMHG
AV LVOT PEAK GRADIENT: 3 MMHG
AV MEAN GRADIENT: 2 MMHG
AV PEAK GRADIENT: 5 MMHG
AV VALVE AREA BY VELOCITY RATIO: 3.9 CM²
AV VALVE AREA: 4.2 CM2
AV VELOCITY RATIO: 0.73
BSA FOR ECHO PROCEDURE: 2.19 M2
CV ECHO LV RWT: 0.32 CM
DOP CALC AO PEAK VEL: 1.1 M/S
DOP CALC AO VTI: 17.4 CM
DOP CALC LVOT AREA: 5.3 CM2
DOP CALC LVOT DIAMETER: 2.6 CM
DOP CALC LVOT PEAK VEL: 0.8 M/S
DOP CALC LVOT STROKE VOLUME: 73.8 CM3
DOP CALCLVOT PEAK VEL VTI: 13.9 CM
E WAVE DECELERATION TIME: 171 MS
E/A RATIO: 0.57
E/E' RATIO: 5 M/S
ECHO EF ESTIMATED: 61 %
ECHO LV POSTERIOR WALL: 0.8 CM (ref 0.6–1.1)
FRACTIONAL SHORTENING: 34 % (ref 28–44)
INTERVENTRICULAR SEPTUM: 0.9 CM (ref 0.6–1.1)
LA MAJOR: 5.3 CM
LA MINOR: 5.4 CM
LA WIDTH: 4 CM
LEFT ATRIUM SIZE: 4 CM
LEFT ATRIUM VOLUME INDEX MOD: 27 ML/M2
LEFT ATRIUM VOLUME INDEX: 33 ML/M2
LEFT ATRIUM VOLUME MOD: 59 ML
LEFT ATRIUM VOLUME: 73 CM3
LEFT INTERNAL DIMENSION IN SYSTOLE: 3.3 CM (ref 2.1–4)
LEFT VENTRICLE DIASTOLIC VOLUME INDEX: 53.21 ML/M2
LEFT VENTRICLE DIASTOLIC VOLUME: 116 ML
LEFT VENTRICLE MASS INDEX: 67.4 G/M2
LEFT VENTRICLE SYSTOLIC VOLUME INDEX: 20.6 ML/M2
LEFT VENTRICLE SYSTOLIC VOLUME: 45 ML
LEFT VENTRICULAR INTERNAL DIMENSION IN DIASTOLE: 5 CM (ref 3.5–6)
LEFT VENTRICULAR MASS: 146.8 G
LV LATERAL E/E' RATIO: 3.5
LV SEPTAL E/E' RATIO: 7.5
MV PEAK A VEL: 0.79 M/S
MV PEAK E VEL: 0.45 M/S
OHS CV RV/LV RATIO: 0.62 CM
RA MAJOR: 4.46 CM
RA PRESSURE ESTIMATED: 3 MMHG
RA WIDTH: 3.39 CM
RIGHT VENTRICLE DIASTOLIC BASEL DIMENSION: 3.1 CM
RV TISSUE DOPPLER FREE WALL SYSTOLIC VELOCITY 1 (APICAL 4 CHAMBER VIEW): 12.07 CM/S
SINUS: 3.77 CM
STJ: 3.01 CM
TDI LATERAL: 0.13 M/S
TDI SEPTAL: 0.06 M/S
TDI: 0.1 M/S
TRICUSPID ANNULAR PLANE SYSTOLIC EXCURSION: 2.27 CM
Z-SCORE OF LEFT VENTRICULAR DIMENSION IN END DIASTOLE: -3.77
Z-SCORE OF LEFT VENTRICULAR DIMENSION IN END SYSTOLE: -2.33

## 2025-03-21 PROCEDURE — 93306 TTE W/DOPPLER COMPLETE: CPT | Mod: 26,HCNC,, | Performed by: INTERNAL MEDICINE

## 2025-03-21 PROCEDURE — 93306 TTE W/DOPPLER COMPLETE: CPT | Mod: HCNC

## 2025-03-24 ENCOUNTER — TELEPHONE (OUTPATIENT)
Dept: PODIATRY | Facility: CLINIC | Age: 55
End: 2025-03-24
Payer: MEDICARE

## 2025-03-24 NOTE — TELEPHONE ENCOUNTER
Attempted to reach out to Mr Ryder to assist him with scheduling his Vitamin D lab, but he was unavailable. Left voice message informing him that I am trying to schedule this lab in conjunction with his next appt if that is okay with him. Call back encouraged.

## 2025-03-25 ENCOUNTER — DOCUMENT SCAN (OUTPATIENT)
Dept: HOME HEALTH SERVICES | Facility: HOSPITAL | Age: 55
End: 2025-03-25
Payer: MEDICARE

## 2025-03-25 ENCOUNTER — TELEPHONE (OUTPATIENT)
Dept: CARDIOLOGY | Facility: CLINIC | Age: 55
End: 2025-03-25
Payer: MEDICARE

## 2025-03-25 NOTE — TELEPHONE ENCOUNTER
Telephonic Encounter - ECHO result   Improvement in mildly reduced EF   - During pre-operative evaluation for Charcot foot reconstruction; pt was noted to have mildly reduced EF in cardiac SPECT.     Repeat ECHO: Left Ventricle: The left ventricle is normal in size. Ventricular mass is normal. Normal wall thickness. Normal wall motion. There is normal systolic function with a visually estimated ejection fraction of 55 - 60%. There is normal diastolic function. Normal left ventricular filling pressure.    Patient was notified about the result and informed the following     Continue with Metoprolol succinate 25 mg   Continue with Jardiance 10 mg qd     Chrystal HERRERA Obi, MD  Department of Cardiovascular disease   [facility]

## 2025-03-27 ENCOUNTER — PATIENT MESSAGE (OUTPATIENT)
Dept: ADMINISTRATIVE | Facility: CLINIC | Age: 55
End: 2025-03-27
Payer: MEDICARE

## 2025-04-02 ENCOUNTER — OFFICE VISIT (OUTPATIENT)
Dept: HOME HEALTH SERVICES | Facility: CLINIC | Age: 55
End: 2025-04-02
Payer: MEDICARE

## 2025-04-02 ENCOUNTER — TELEPHONE (OUTPATIENT)
Dept: ADMINISTRATIVE | Facility: CLINIC | Age: 55
End: 2025-04-02
Payer: MEDICARE

## 2025-04-02 VITALS — WEIGHT: 205 LBS | HEIGHT: 73 IN | BODY MASS INDEX: 27.17 KG/M2

## 2025-04-02 DIAGNOSIS — Z89.421 H/O AMPUTATION OF LESSER TOE, RIGHT: ICD-10-CM

## 2025-04-02 DIAGNOSIS — M86.672 CHRONIC OSTEOMYELITIS OF LEFT FOOT: ICD-10-CM

## 2025-04-02 DIAGNOSIS — Z79.4 TYPE 2 DIABETES MELLITUS WITH HYPERGLYCEMIA, WITH LONG-TERM CURRENT USE OF INSULIN: ICD-10-CM

## 2025-04-02 DIAGNOSIS — E78.2 MIXED HYPERLIPIDEMIA: ICD-10-CM

## 2025-04-02 DIAGNOSIS — I50.22 HEART FAILURE WITH MILDLY REDUCED EJECTION FRACTION: ICD-10-CM

## 2025-04-02 DIAGNOSIS — R26.9 ABNORMALITY OF GAIT AND MOBILITY: ICD-10-CM

## 2025-04-02 DIAGNOSIS — E11.65 TYPE 2 DIABETES MELLITUS WITH HYPERGLYCEMIA, WITH LONG-TERM CURRENT USE OF INSULIN: ICD-10-CM

## 2025-04-02 DIAGNOSIS — Z00.00 ENCOUNTER FOR MEDICARE ANNUAL WELLNESS EXAM: Primary | ICD-10-CM

## 2025-04-02 DIAGNOSIS — Z85.038 HISTORY OF COLON CANCER: ICD-10-CM

## 2025-04-02 DIAGNOSIS — Z89.422 HX OF AMPUTATION OF LESSER TOE, LEFT: ICD-10-CM

## 2025-04-02 DIAGNOSIS — L97.426 DIABETIC ULCER OF LEFT MIDFOOT ASSOCIATED WITH TYPE 2 DIABETES MELLITUS, WITH BONE INVOLVEMENT WITHOUT EVIDENCE OF NECROSIS: ICD-10-CM

## 2025-04-02 DIAGNOSIS — Z79.4 TYPE 2 DIABETES MELLITUS WITH BOTH EYES AFFECTED BY MILD NONPROLIFERATIVE RETINOPATHY AND MACULAR EDEMA, WITH LONG-TERM CURRENT USE OF INSULIN: ICD-10-CM

## 2025-04-02 DIAGNOSIS — E11.621 DIABETIC ULCER OF LEFT MIDFOOT ASSOCIATED WITH TYPE 2 DIABETES MELLITUS, WITH BONE INVOLVEMENT WITHOUT EVIDENCE OF NECROSIS: ICD-10-CM

## 2025-04-02 DIAGNOSIS — E11.3213 TYPE 2 DIABETES MELLITUS WITH BOTH EYES AFFECTED BY MILD NONPROLIFERATIVE RETINOPATHY AND MACULAR EDEMA, WITH LONG-TERM CURRENT USE OF INSULIN: ICD-10-CM

## 2025-04-02 NOTE — PROGRESS NOTES
"The patient location is: [place]  The chief complaint leading to consultation is: Health Risk Assessment    Visit type: audiovisual    Face to Face time with patient: 20  35 minutes of total time spent on the encounter, which includes face to face time and non-face to face time preparing to see the patient (eg, review of tests), Obtaining and/or reviewing separately obtained history, Documenting clinical information in the electronic or other health record, Independently interpreting results (not separately reported) and communicating results to the patient/family/caregiver, or Care coordination (not separately reported).         Each patient to whom he or she provides medical services by telemedicine is:  (1) informed of the relationship between the physician and patient and the respective role of any other health care provider with respect to management of the patient; and (2) notified that he or she may decline to receive medical services by telemedicine and may withdraw from such care at any time.    Notes:                         Indio Ryder presented for a follow-up Medicare AWV today. The following components were reviewed and updated:    Medical history  Family History  Social history  Allergies and Current Medications  Health Risk Assessment  Health Maintenance  Care Team    **See Completed Assessments for Annual Wellness visit with in the encounter summary    The following assessments were completed:  Depression Screening  Cognitive function Screening  Timed Get Up Test  Whisper Test      Opioid documentation:      Patient does not have a current opioid prescription.          Vitals:    04/02/25 1256   Weight: 93 kg (205 lb)   Height: 6' 1" (1.854 m)     Body mass index is 27.05 kg/m².       Physical Exam  Constitutional:       Appearance: Normal appearance.   Neurological:      General: No focal deficit present.      Mental Status: He is alert and oriented to person, place, and time. "           Diagnoses and health risks identified today and associated recommendations/orders:  1. Encounter for Medicare annual wellness exam  Assessments completed. Preventive measures, health maintenance, and immunizations reviewed with patient.  - Referral to Enhanced Annual Wellness Visit (eAWV) W+1    2. Heart failure with mildly reduced ejection fraction  Stable, followed by Cardiology. Patient was started on Metoprolol. Patient reports that he's not currently taking the medication.    3. Type 2 diabetes mellitus with hyperglycemia, with long-term current use of insulin  Stable, patient on Novolog and Toujeo insulin. Followed by PCP.    4. Type 2 diabetes mellitus with 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 edema, with long-term current use of insulin  Stable, followed by PCP and Optometry.    5. Diabetic ulcer of left midfoot associated with type 2 diabetes mellitus, with bone involvement without evidence of necrosis  Stable, followed by Podiatry. Patient S/P external fixation removal on 2/12/25.    6. Chronic osteomyelitis of left foot  Stable, followed by Podiatry.    7. Hx of amputation of lesser toe, left  Stable, followed by Podiatry.    8. H/O amputation of lesser toe, right  Stable, followed by Podiatry.    9. Mixed hyperlipidemia  Stable, controlled. Patient on Lipitor. Followed by PCP.    10. History of colon cancer  Stable, followed by PCP.    11. Abnormality of gait and mobility  Stable, patient has leg scooter for mobility. Followed by PCP.      Provided Indio with a 5-10 year written screening schedule and personal prevention plan. Recommendations were developed using the USPSTF age appropriate recommendations. Education, counseling, and referrals were provided as needed.  After Visit Summary printed and given to patient which includes a list of additional screenings\tests needed.    Follow up in about 1 year (around 4/2/2026) for your next annual wellness  visit.      Izabela Marsh, NP  I offered to discuss advanced care planning, including how to pick a person who would make decisions for you if you were unable to make them for yourself, called a health care power of , and what kind of decisions you might make such as use of life sustaining treatments such as ventilators and tube feeding when faced with a life limiting illness recorded on a living will that they will need to know. (How you want to be cared for as you near the end of your natural life)     X Patient is interested in learning more about how to make advanced directives.  I provided them paperwork and offered to discuss this with them.

## 2025-04-02 NOTE — PATIENT INSTRUCTIONS
Counseling and Referral of Other Preventative  (Italic type indicates deductible and co-insurance are waived)    Patient Name: Indio Ryder  Today's Date: 4/2/2025    Health Maintenance       Date Due Completion Date    Shingles Vaccine (1 of 2) Never done ---    Pneumococcal Vaccines (Age 50+) (3 of 3 - PPSV23, PCV20 or PCV21) 08/12/2019 6/9/2017    COVID-19 Vaccine (3 - Moderna risk series) 12/17/2021 11/19/2021    Diabetes Urine Screening 07/01/2023 7/1/2022    Diabetic Eye Exam 10/06/2023 10/6/2022    Hemoglobin A1c 07/02/2025 1/2/2025    Colorectal Cancer Screening 09/13/2025 9/13/2023    Foot Exam 10/16/2025 10/16/2024    Lipid Panel 02/11/2026 2/11/2025    Low Dose Statin 04/02/2026 4/2/2025    TETANUS VACCINE 06/09/2027 6/9/2017    RSV Vaccine (Age 60+ and Pregnant patients) (1 - 1-dose 75+ series) 07/12/2043 ---        No orders of the defined types were placed in this encounter.      The following information is provided to all patients.  This information is to help you find resources for any of the problems found today that may be affecting your health:                  Living healthy guide: www.[facility].[place].gov      Understanding Diabetes: www.diabetes.org      Eating healthy: www.cdc.gov/healthyweight      CDC home safety checklist: www.cdc.gov/steadi/patient.html      Agency on Aging: www.goea.[place].[facility]      Alcoholics anonymous (AA): www.aa.org      Physical Activity: www.junior.nih.gov/es7yuod      Tobacco use: www.quitwithusla.org

## 2025-04-04 ENCOUNTER — DOCUMENT SCAN (OUTPATIENT)
Dept: HOME HEALTH SERVICES | Facility: HOSPITAL | Age: 55
End: 2025-04-04
Payer: MEDICARE

## 2025-04-08 ENCOUNTER — HOSPITAL ENCOUNTER (OUTPATIENT)
Facility: HOSPITAL | Age: 55
Discharge: HOME | End: 2025-04-08
Attending: PODIATRIST
Payer: MEDICARE

## 2025-04-08 ENCOUNTER — OFFICE VISIT (OUTPATIENT)
Dept: PODIATRY | Facility: CLINIC | Age: 55
End: 2025-04-08
Payer: MEDICARE

## 2025-04-08 VITALS
BODY MASS INDEX: 27.17 KG/M2 | HEIGHT: 73 IN | SYSTOLIC BLOOD PRESSURE: 111 MMHG | WEIGHT: 205 LBS | HEART RATE: 108 BPM | DIASTOLIC BLOOD PRESSURE: 68 MMHG

## 2025-04-08 DIAGNOSIS — E11.610 TYPE 2 DIABETES MELLITUS WITH CHARCOT JOINT ARTHROPATHY: ICD-10-CM

## 2025-04-08 DIAGNOSIS — Z98.890 POSTOPERATIVE STATE: ICD-10-CM

## 2025-04-08 DIAGNOSIS — Z98.890 POSTOPERATIVE STATE: Primary | ICD-10-CM

## 2025-04-08 PROCEDURE — 73630 X-RAY EXAM OF FOOT: CPT | Mod: 26,HCNC,LT, | Performed by: INTERNAL MEDICINE

## 2025-04-08 PROCEDURE — 73630 X-RAY EXAM OF FOOT: CPT | Mod: TC,HCNC,PN,LT

## 2025-04-08 PROCEDURE — 99999 PR PBB SHADOW E&M-EST. PATIENT-LVL III: CPT | Mod: PBBFAC,HCNC,, | Performed by: PODIATRIST

## 2025-04-08 NOTE — PROGRESS NOTES
Subjective:     Patient ID: Indio Ryder Jr. is a 54 y.o. male.    Chief Complaint: Diabetic Foot Ulcer, surgical consult    Indio is a 54 y.o. male who presents to the clinic for evaluation and treatment of high risk feet. 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), Colon adenocarcinoma (04/12/2022), Diabetic ketoacidosis without coma associated with type 2 diabetes mellitus (05/30/2017), Diabetic ulcer of right foot associated with type 2 diabetes mellitus (06/03/2015), Disorder of kidney and ureter, Essential hypertension (06/06/2013), Group B streptococcal infection (01/13/2017), Mixed hyperlipidemia (08/12/2014), Septic arthritis of left foot (04/28/2021), Shoulder impingement (08/12/2014), Subacute osteomyelitis of right foot (01/12/2017), Traumatic amputation of fifth toe of right foot (07/02/2015), and Type 2 diabetes mellitus with diabetic neuropathy, with long-term current use of insulin (05/03/2016). The patient's chief complaint is foot ulcer along the left plantar midfoot.  Patient had been seeing Dr. Peterson in the [facility] on a biweekly basis for management of his left foot ulceration. He presents today for surgical consultation, evaluation for rearfoot fusion in regards to his left foot and ankle.  His last A1c was obtained in April, greater than 14.0.  Patient has had extensive pedal surgical history, last having had a debridement per Dr. Peterson in July of 2023. Patient had received a custom ankle brace in January and states that he subsequently had developed an ulceration as a result of it; previous to this, the ulceration of his left midfoot had healed over.     09/16/2024:  Follow-up from updated weight-bearing left foot and ankle x-ray imaging and lab work.  Seen per  in wound clinic on weekly basis.  Ambulating with cam boot.  States he is in the process of adjusting his medications due to uncontrolled hyperglycemia.    11/11/2024:  Returns for  re-evaluation of progressive varus deformity to left foot and chronic ulceration followed weekly at [facility].  States his hemoglobin A1c improved and he is monitoring his continuous glucose monitor noting that his daily blood sugar values improved a lot since the last A1c was taken on 10/16/2024 had shown value of 11%.  Denies any pain.  Ambulating with tall Cam boot.  States he change his diet.    11/22/2024:  Returns to discuss CT results.  Seen weekly in [facility] Care.    12/16/2024:  Returns for wound check.  Three weeks postop.  Followed by [facility] home health.  Tolerating IV cefepime well.  Seen per .  Nonweightbearing left lower extremity.    01/14/2025:  Presents for wound check.  Relates antibiotic therapy completed.  PICC line removed.  Denies any slips, trips or falls.  No new complaints.  Accompanied by his sister.    02/04/2025: Returns for wound check.  Scheduled for staged surgical intervention for arthrodesis of the left foot and removal of the external fixation next week on 02/12/2025.  Complains of the new onset wound to the left 3rd toe that began a couple weeks ago and was treated by [facility].  Believes the Ace wrap may have been too tight and caused a pressure wound.  Denies any trauma.    02/20/2025:  Post removal multiplane external fixation left lower extremity with arthrodesis multiple midtarsal and tarsal joints left foot on 02/12/2025.  No pain reported.  Denies any slips, trips or falls.  Utilizing rolling knee scooter.  Cast intact.    03/11/2025:  1 month postop.  Denies any slips, trips or falls.  Cast intact.  No pain reported.  No new concerns.    04/08/2025:  2 months postop.  Denies any slips, trips or falls.  Utilizing rolling knee scooter.  Cast intact.    PCP: Lorena Thakur MD      Current shoe gear:  Affected Foot: Surgical boot, rolling knee scooter     Unaffected Foot: Extra depth shoes    Hemoglobin A1C   Date Value Ref Range Status   01/02/2025 7.5 (H) 4.0 -  5.6 % Final     Comment:     ADA Screening Guidelines:  5.7-6.4%  Consistent with prediabetes  >or=6.5%  Consistent with diabetes    High levels of fetal hemoglobin interfere with the HbA1C  assay. Heterozygous hemoglobin variants (HbS, HgC, etc)do  not significantly interfere with this assay.   However, presence of multiple variants may affect accuracy.     10/16/2024 11.0 (H) 4.0 - 5.6 % Final     Comment:     ADA Screening Guidelines:  5.7-6.4%  Consistent with prediabetes  >or=6.5%  Consistent with diabetes    High levels of fetal hemoglobin interfere with the HbA1C  assay. Heterozygous hemoglobin variants (HbS, HgC, etc)do  not significantly interfere with this assay.   However, presence of multiple variants may affect accuracy.     08/08/2024 >14.0 (H) 4.0 - 5.6 % Final     Comment:     ADA Screening Guidelines:  5.7-6.4%  Consistent with prediabetes  >or=6.5%  Consistent with diabetes    High levels of fetal hemoglobin interfere with the HbA1C  assay. Heterozygous hemoglobin variants (HbS, HgC, etc)do  not significantly interfere with this assay.   However, presence of multiple variants may affect accuracy.         Review of Systems   Constitutional: Negative for chills and fever.   HENT:  Negative for congestion.    Eyes: Negative.    Cardiovascular:  Negative for chest pain.   Respiratory:  Negative for cough and shortness of breath.    Skin:  Positive for dry skin.   Musculoskeletal:  Negative for back pain and joint pain.   Gastrointestinal:  Negative for nausea and vomiting.   Neurological:  Positive for numbness.   Psychiatric/Behavioral:  Negative for altered mental status.         Objective:     Physical Exam  Vitals reviewed.   Constitutional:       General: He is not in acute distress.     Appearance: He is not ill-appearing.   Cardiovascular:      Comments: Capillary refill greater than 3 seconds to left digits.     Pedal pulses heard only through Doppler bilateral:    Triphasic PT, monophasic DP  left   Monophasic PT, monophasic DP right  Musculoskeletal:         General: No swelling or tenderness.      Right lower leg: No edema.      Left lower leg: No edema.      Comments: Amputated left 5th ray.  Improved clinical alignment to left foot.  Moderate reduction in edema to left foot.   Feet:      Right foot:      Protective Sensation: 10 sites tested.  0 sites sensed.      Skin integrity: Ulcer present.      Left foot:      Protective Sensation: 10 sites tested.  0 sites sensed.   Skin:     General: Skin is warm and dry.      Findings: No ecchymosis or erythema.      Nails: There is no clubbing.      Comments: Normal-appearing scars to the prior incision sites.  No open wounds to left lower extremity.  There is some diffuse dry skin to left lower extremity.   Neurological:      Mental Status: He is alert and oriented to person, place, and time.      Cranial Nerves: No cranial nerve deficit.      Sensory: Sensory deficit present.      Comments: 0/10 [place] monofilament bilateral   Psychiatric:         Mood and Affect: Mood normal.         Assessment:      Encounter Diagnoses   Name Primary?    Postoperative state Yes    Type 2 diabetes mellitus with Charcot joint arthropathy      Plan:     Indio was seen today for post-op evaluation.    Diagnoses and all orders for this visit:    Postoperative state  -     X-Ray Ankle Complete Left; Future  -     X-Ray Foot Complete Left; Future    Type 2 diabetes mellitus with Charcot joint arthropathy  -     X-Ray Ankle Complete Left; Future  -     X-Ray Foot Complete Left; Future      I counseled the patient on his conditions, their implications and medical management.    Cast and dressing to left lower extremity removed.  Left lower extremity cleansed Hibiclens scrub.  Applied Tubigrip F x2 to left lower extremity for compression.  Well-padded fiberglass total contact cast utilizing true cast kit applied to left lower extremity maintain the left foot 90° to the  leg.    Continue elevation at rest.    Patient is weight-bearing for transfers and very short distances less than 10 ft with a walker as needed.  He should continue using the rolling knee scooter.    Reviewed left foot x-ray noting maintained fixation and increased consolidation of the fusion sites with increased bone callus formation and no signs of hardware failure.    RTC within 4 weeks for cast removal and follow-up weight-bearing left foot and ankle x-ray.    A portion of this note was generated by voice recognition software and may contain spelling and grammar errors.

## 2025-04-21 ENCOUNTER — TELEPHONE (OUTPATIENT)
Dept: PODIATRY | Facility: CLINIC | Age: 55
End: 2025-04-21
Payer: MEDICARE

## 2025-04-21 NOTE — TELEPHONE ENCOUNTER
----- Message from Chandrakant sent at 4/21/2025 12:34 PM CDT -----  .Type:  Needs Medical AdviceWho Called: ptWould the patient rather a call back or a response via [employer]? Call [facility] Call Back Number: [phone] Information: Pt stated he missed a call and would like a call back

## 2025-04-21 NOTE — TELEPHONE ENCOUNTER
Spoke with  Britni to inform him that Dr Reyes's office did not call him, but wanted to assist him with scheduling vitamin D lab along with his upcoming lab on 5/5/25 that had been ordered per Dr Reyes for a Vitamin D level that he had not had taken previously. Per  Janewilliam okay to schedule. No other needs voiced. Call back encouraged if needed. Verbalized understanding of upcoming appt with Dr Reyes scheduled for 5/6/25/

## 2025-05-06 ENCOUNTER — LAB VISIT (OUTPATIENT)
Dept: LAB | Facility: HOSPITAL | Age: 55
End: 2025-05-06
Attending: PODIATRIST
Payer: MEDICARE

## 2025-05-06 ENCOUNTER — HOSPITAL ENCOUNTER (OUTPATIENT)
Facility: HOSPITAL | Age: 55
Discharge: HOME | End: 2025-05-06
Attending: PODIATRIST
Payer: MEDICARE

## 2025-05-06 ENCOUNTER — OFFICE VISIT (OUTPATIENT)
Dept: PODIATRY | Facility: CLINIC | Age: 55
End: 2025-05-06
Payer: MEDICARE

## 2025-05-06 VITALS
SYSTOLIC BLOOD PRESSURE: 97 MMHG | DIASTOLIC BLOOD PRESSURE: 66 MMHG | HEART RATE: 109 BPM | HEIGHT: 73 IN | WEIGHT: 205 LBS | BODY MASS INDEX: 27.17 KG/M2

## 2025-05-06 DIAGNOSIS — Z98.890 POSTOPERATIVE STATE: ICD-10-CM

## 2025-05-06 DIAGNOSIS — Z98.890 POSTOPERATIVE STATE: Primary | ICD-10-CM

## 2025-05-06 DIAGNOSIS — E11.610 TYPE 2 DIABETES MELLITUS WITH CHARCOT JOINT ARTHROPATHY: ICD-10-CM

## 2025-05-06 DIAGNOSIS — E08.621 DIABETIC ULCER OF LEFT MIDFOOT ASSOCIATED WITH DIABETES MELLITUS DUE TO UNDERLYING CONDITION, WITH FAT LAYER EXPOSED: ICD-10-CM

## 2025-05-06 DIAGNOSIS — E08.621 DIABETIC ULCER OF TOE OF LEFT FOOT ASSOCIATED WITH DIABETES MELLITUS DUE TO UNDERLYING CONDITION, WITH NECROSIS OF BONE: ICD-10-CM

## 2025-05-06 DIAGNOSIS — M89.9 DISORDER OF BONE: ICD-10-CM

## 2025-05-06 DIAGNOSIS — L97.524 DIABETIC ULCER OF TOE OF LEFT FOOT ASSOCIATED WITH DIABETES MELLITUS DUE TO UNDERLYING CONDITION, WITH NECROSIS OF BONE: ICD-10-CM

## 2025-05-06 DIAGNOSIS — L97.422 DIABETIC ULCER OF LEFT MIDFOOT ASSOCIATED WITH DIABETES MELLITUS DUE TO UNDERLYING CONDITION, WITH FAT LAYER EXPOSED: ICD-10-CM

## 2025-05-06 DIAGNOSIS — M96.0 NONUNION AFTER ARTHRODESIS: ICD-10-CM

## 2025-05-06 LAB — 25(OH)D3+25(OH)D2 SERPL-MCNC: 9 NG/ML (ref 30–96)

## 2025-05-06 PROCEDURE — 99999 PR PBB SHADOW E&M-EST. PATIENT-LVL IV: CPT | Mod: PBBFAC,HCNC,, | Performed by: PODIATRIST

## 2025-05-06 PROCEDURE — 36415 COLL VENOUS BLD VENIPUNCTURE: CPT | Mod: HCNC

## 2025-05-06 PROCEDURE — 73630 X-RAY EXAM OF FOOT: CPT | Mod: 26,HCNC,LT, | Performed by: RADIOLOGY

## 2025-05-06 PROCEDURE — 73610 X-RAY EXAM OF ANKLE: CPT | Mod: TC,HCNC,PN,LT

## 2025-05-06 PROCEDURE — 73630 X-RAY EXAM OF FOOT: CPT | Mod: TC,HCNC,PN,LT

## 2025-05-06 PROCEDURE — 82306 VITAMIN D 25 HYDROXY: CPT | Mod: HCNC

## 2025-05-06 PROCEDURE — 73610 X-RAY EXAM OF ANKLE: CPT | Mod: 26,HCNC,LT, | Performed by: RADIOLOGY

## 2025-05-06 NOTE — PROGRESS NOTES
Subjective:     Patient ID: Indio Ryder Jr. is a 54 y.o. male.    Chief Complaint: Diabetic Foot Ulcer, surgical consult    Indio is a 54 y.o. male who presents to the clinic for evaluation and treatment of high risk feet. 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), Colon adenocarcinoma (04/12/2022), Diabetic ketoacidosis without coma associated with type 2 diabetes mellitus (05/30/2017), Diabetic ulcer of right foot associated with type 2 diabetes mellitus (06/03/2015), Disorder of kidney and ureter, Essential hypertension (06/06/2013), Group B streptococcal infection (01/13/2017), Mixed hyperlipidemia (08/12/2014), Septic arthritis of left foot (04/28/2021), Shoulder impingement (08/12/2014), Subacute osteomyelitis of right foot (01/12/2017), Traumatic amputation of fifth toe of right foot (07/02/2015), and Type 2 diabetes mellitus with diabetic neuropathy, with long-term current use of insulin (05/03/2016). The patient's chief complaint is foot ulcer along the left plantar midfoot.  Patient had been seeing Dr. Peterson in the [facility] on a biweekly basis for management of his left foot ulceration. He presents today for surgical consultation, evaluation for rearfoot fusion in regards to his left foot and ankle.  His last A1c was obtained in April, greater than 14.0.  Patient has had extensive pedal surgical history, last having had a debridement per Dr. Peterson in July of 2023. Patient had received a custom ankle brace in January and states that he subsequently had developed an ulceration as a result of it; previous to this, the ulceration of his left midfoot had healed over.     09/16/2024:  Follow-up from updated weight-bearing left foot and ankle x-ray imaging and lab work.  Seen per  in wound clinic on weekly basis.  Ambulating with cam boot.  States he is in the process of adjusting his medications due to uncontrolled hyperglycemia.    11/11/2024:  Returns for  re-evaluation of progressive varus deformity to left foot and chronic ulceration followed weekly at [facility].  States his hemoglobin A1c improved and he is monitoring his continuous glucose monitor noting that his daily blood sugar values improved a lot since the last A1c was taken on 10/16/2024 had shown value of 11%.  Denies any pain.  Ambulating with tall Cam boot.  States he change his diet.    11/22/2024:  Returns to discuss CT results.  Seen weekly in [facility].    12/16/2024:  Returns for wound check.  Three weeks postop.  Followed by [facility] home [facility].  Tolerating IV cefepime well.  Seen per .  Nonweightbearing left lower extremity.    01/14/2025:  Presents for wound check.  Relates antibiotic therapy completed.  PICC line removed.  Denies any slips, trips or falls.  No new complaints.  Accompanied by his sister.    02/04/2025: Returns for wound check.  Scheduled for staged surgical intervention for arthrodesis of the left foot and removal of the external fixation next week on 02/12/2025.  Complains of the new onset wound to the left 3rd toe that began a couple weeks ago and was treated by [facility].  Believes the Ace wrap may have been too tight and caused a pressure wound.  Denies any trauma.    02/20/2025:  Post removal multiplane external fixation left lower extremity with arthrodesis multiple midtarsal and tarsal joints left foot on 02/12/2025.  No pain reported.  Denies any slips, trips or falls.  Utilizing rolling knee scooter.  Cast intact.    03/11/2025:  1 month postop.  Denies any slips, trips or falls.  Cast intact.  No pain reported.  No new concerns.    04/08/2025:  2 months postop.  Denies any slips, trips or falls.  Utilizing rolling knee scooter.  Cast intact.    05/06/2025: Returns for wound check and cast removal left lower extremity.  No new concerns.  States he had some anxiety with placing weight on the left foot for the follow-up left foot and ankle x-ray.    PCP:  Lorena Thakur MD      Current shoe gear:  Affected Foot: Surgical boot, rolling knee scooter     Unaffected Foot: Extra depth shoes    Hemoglobin A1C   Date Value Ref Range Status   01/02/2025 7.5 (H) 4.0 - 5.6 % Final     Comment:     ADA Screening Guidelines:  5.7-6.4%  Consistent with prediabetes  >or=6.5%  Consistent with diabetes    High levels of fetal hemoglobin interfere with the HbA1C  assay. Heterozygous hemoglobin variants (HbS, HgC, etc)do  not significantly interfere with this assay.   However, presence of multiple variants may affect accuracy.     10/16/2024 11.0 (H) 4.0 - 5.6 % Final     Comment:     ADA Screening Guidelines:  5.7-6.4%  Consistent with prediabetes  >or=6.5%  Consistent with diabetes    High levels of fetal hemoglobin interfere with the HbA1C  assay. Heterozygous hemoglobin variants (HbS, HgC, etc)do  not significantly interfere with this assay.   However, presence of multiple variants may affect accuracy.     08/08/2024 >14.0 (H) 4.0 - 5.6 % Final     Comment:     ADA Screening Guidelines:  5.7-6.4%  Consistent with prediabetes  >or=6.5%  Consistent with diabetes    High levels of fetal hemoglobin interfere with the HbA1C  assay. Heterozygous hemoglobin variants (HbS, HgC, etc)do  not significantly interfere with this assay.   However, presence of multiple variants may affect accuracy.         Review of Systems   Constitutional: Negative for chills and fever.   HENT:  Negative for congestion.    Eyes: Negative.    Cardiovascular:  Negative for chest pain.   Respiratory:  Negative for cough and shortness of breath.    Skin:  Positive for dry skin.   Musculoskeletal:  Negative for back pain and joint pain.   Gastrointestinal:  Negative for nausea and vomiting.   Neurological:  Positive for numbness.   Psychiatric/Behavioral:  Negative for altered mental status.         Objective:     Physical Exam  Vitals reviewed.   Constitutional:       General: He is not in acute distress.      Appearance: He is not ill-appearing.   Cardiovascular:      Comments: Capillary refill greater than 3 seconds to left digits.     Pedal pulses heard only through Doppler bilateral:    Triphasic PT, monophasic DP left   Monophasic PT, monophasic DP right  Musculoskeletal:         General: No swelling or tenderness.      Right lower leg: No edema.      Left lower leg: No edema.      Comments: Amputated left 5th ray.  Improved clinical alignment to left foot with reducible varus rotation of the left foot which seems to be emanating from the ankle.  Moderate reduction in edema to left foot.   Feet:      Right foot:      Protective Sensation: 10 sites tested.  0 sites sensed.      Skin integrity: Ulcer present.      Left foot:      Protective Sensation: 10 sites tested.  0 sites sensed.   Skin:     General: Skin is warm and dry.      Findings: No ecchymosis or erythema.      Nails: There is no clubbing.      Comments: Today note hypergranular wound overlying the distal lateral aspect of the left 3rd toe DIPJ region.  There is no surrounding erythema.  No significant odor.  No significant drainage.  Does not probe to bone.  Post debridement wound site measures 0.9 x 0.9 x 0.1 cm.    There is another small hypergranular wound overlying the dorsal lateral left midfoot extending down to subcu measuring 0.3 x 0.4 x 0.1 cm post debridement.  No surrounding erythema.  Does not probe, track or undermine post debridement.     Neurological:      Mental Status: He is alert and oriented to person, place, and time.      Cranial Nerves: No cranial nerve deficit.      Sensory: Sensory deficit present.      Comments: 0/10 [place] monofilament bilateral   Psychiatric:         Mood and Affect: Mood normal.         Assessment:      Encounter Diagnoses   Name Primary?    Postoperative state Yes    Diabetic ulcer of toe of left foot associated with diabetes mellitus due to underlying condition, with necrosis of bone     Diabetic  ulcer of left midfoot associated with diabetes mellitus due to underlying condition, with fat layer exposed     Nonunion after arthrodesis      Plan:     Indio was seen today for post-op evaluation.    Diagnoses and all orders for this visit:    Postoperative state  -     X-Ray Foot Complete Left; Future  -     Bone Stimulator for Home Use    Diabetic ulcer of toe of left foot associated with diabetes mellitus due to underlying condition, with necrosis of bone  -     Wound Debridement  -     X-Ray Foot Complete Left; Future    Diabetic ulcer of left midfoot associated with diabetes mellitus due to underlying condition, with fat layer exposed  -     Wound Debridement  -     X-Ray Foot Complete Left; Future    Nonunion after arthrodesis  -     Bone Stimulator for Home Use      I counseled the patient on his conditions, their implications and medical management.    Cast and dressing to left lower extremity removed.  Left lower extremity cleansed Hibiclens scrub.  Excisional wound debridement per attached note.  Applied Iodosorb with Aquacel over the area followed by coflex with calamine from the forefoot to proximal leg maintain the left foot in a reduced position.  Well-padded fiberglass total contact cast utilizing true cast kit applied to left lower extremity maintain the left foot 90° to the leg and a rectus and reduce the alignment.    Continue elevation at rest.    Patient is weight-bearing for transfers and very short distances less than 10 ft with a walker as needed.  He should continue using the rolling knee scooter.    Reviewed left foot and ankle x-ray completed today in detail.  They are not weight-bearing as ordered.  There is some residual mild adduction and varus rotation of the foot with absent 4th and 5th rays however the 4th toe is present.  Bio active glass has a course appearance in his intact in the region of the cuboid with intact fixation.  Left ankle x-ray shows some flattening of the talus  with moderate osteophytic lipping of the distal tibia and neck of the talus.  Note definitive consolidation of the arthrodesis sites.  There is some lucency surrounding the screws especially medial column screw indicative of motion.    RTC within 2 weeks for cast removal and follow-up weight-bearing left foot  x-ray.  Attempt to get bone stimulator once the wound sites are healed.    Assisted per Vishnu Lewis DPM PGY 2    A portion of this note was generated by voice recognition software and may contain spelling and grammar errors.

## 2025-05-06 NOTE — PROCEDURES
"Wound Debridement    Date/Time: 5/6/2025 3:00 PM    Performed by: Daniel Reyes DPM  Authorized by: Daniel Reyes DPM    Time out: Immediately prior to procedure a "time out" was called to verify the correct patient, procedure, equipment, support staff and site/side marked as required.    Consent Done?:  Yes (Verbal)    Preparation: Patient was prepped and draped with clean technique    Local anesthesia used?: No      Wound Details:    Location:  Left foot    Location:  Left 3rd Toe (distal dorsal)    Type of Debridement:  Excisional       Length (cm):  0.9       Width (cm):  0.9       Depth (cm):  0.1       Area (sq cm):  0.64       Percent Debrided (%):  100       Total Area Debrided (sq cm):  0.64    Depth of debridement:  Subcutaneous tissue    Tissue debrided:  Subcutaneous and Hypergranulation    Devitalized tissue debrided:  Fibrin and Slough    Instruments:  Blade  Bleeding:  Moderate  Hemostasis Achieved: Yes  Method Used:  Pressure and Silver Nitrate    2nd Wound Details:     Location:  Left foot    Location:  Left Midfoot (dorsal lateral)    Location:  Left Midfoot (dorsal lateral)    Type of Debridement:  Excisional       Length (cm):  0.4       Area (sq cm):  0.09       Width (cm):  0.3       Percent Debrided (%):  100       Depth (cm):  0.1       Total Area Debrided (sq cm):  0.09    Depth of debridement:  Subcutaneous tissue    Tissue debrided:  Subcutaneous and Hypergranulation    Devitalized tissue debrided:  Fibrin, Slough and Callus    Instruments:  Curette  Bleeding:  Minimal  Hemostasis Achieved: Yes    Method Used:  Pressure and Silver Nitrate  Patient tolerance:  Patient tolerated the procedure well with no immediate complications  1st Wound Pain Assessment: 0     Assisted per Vishnu Debbie DPM PGY 2    "

## 2025-05-09 ENCOUNTER — PATIENT MESSAGE (OUTPATIENT)
Dept: PODIATRY | Facility: CLINIC | Age: 55
End: 2025-05-09
Payer: MEDICARE

## 2025-05-09 ENCOUNTER — TELEPHONE (OUTPATIENT)
Dept: PODIATRY | Facility: CLINIC | Age: 55
End: 2025-05-09
Payer: MEDICARE

## 2025-05-09 RX ORDER — ASPIRIN 325 MG
50000 TABLET, DELAYED RELEASE (ENTERIC COATED) ORAL
Qty: 24 CAPSULE | Refills: 4 | Status: SHIPPED | OUTPATIENT
Start: 2025-05-12

## 2025-05-09 NOTE — TELEPHONE ENCOUNTER
Please notify the patient that his vitamin D level is extremely low. He is to take  a high dose of vitamin D3 twice a week in order to raise his level.          Called patient and gave him the information above on his voicemail.      Zaira

## 2025-05-09 NOTE — TELEPHONE ENCOUNTER
Patient called back and I informed him of the information that Dr. Reyes sent over regarding taking Vitamin D3 twice daily to get his Vitamin D level up.  Patient understands and has no more questions at this time.    Zaira

## 2025-05-09 NOTE — TELEPHONE ENCOUNTER
Please notify the patient that his vitamin D level is extremely low. He is to take  a high dose of vitamin D3 twice a week in order to raise his level.

## 2025-05-20 ENCOUNTER — OFFICE VISIT (OUTPATIENT)
Dept: PODIATRY | Facility: CLINIC | Age: 55
End: 2025-05-20
Payer: MEDICARE

## 2025-05-20 ENCOUNTER — HOSPITAL ENCOUNTER (OUTPATIENT)
Facility: HOSPITAL | Age: 55
Discharge: HOME | End: 2025-05-20
Attending: PODIATRIST
Payer: MEDICARE

## 2025-05-20 DIAGNOSIS — L97.524 DIABETIC ULCER OF TOE OF LEFT FOOT ASSOCIATED WITH DIABETES MELLITUS DUE TO UNDERLYING CONDITION, WITH NECROSIS OF BONE: ICD-10-CM

## 2025-05-20 DIAGNOSIS — E11.610 TYPE 2 DIABETES MELLITUS WITH CHARCOT JOINT ARTHROPATHY: ICD-10-CM

## 2025-05-20 DIAGNOSIS — M96.0 NONUNION AFTER ARTHRODESIS: ICD-10-CM

## 2025-05-20 DIAGNOSIS — Z98.890 POSTOPERATIVE STATE: ICD-10-CM

## 2025-05-20 DIAGNOSIS — E08.621 DIABETIC ULCER OF LEFT MIDFOOT ASSOCIATED WITH DIABETES MELLITUS DUE TO UNDERLYING CONDITION, WITH FAT LAYER EXPOSED: ICD-10-CM

## 2025-05-20 DIAGNOSIS — E08.621 DIABETIC ULCER OF TOE OF LEFT FOOT ASSOCIATED WITH DIABETES MELLITUS DUE TO UNDERLYING CONDITION, WITH NECROSIS OF BONE: ICD-10-CM

## 2025-05-20 DIAGNOSIS — E08.621 DIABETIC ULCER OF TOE OF LEFT FOOT ASSOCIATED WITH DIABETES MELLITUS DUE TO UNDERLYING CONDITION, WITH FAT LAYER EXPOSED: Primary | ICD-10-CM

## 2025-05-20 DIAGNOSIS — L89.622 DECUBITUS ULCER OF LEFT HEEL, STAGE 2: ICD-10-CM

## 2025-05-20 DIAGNOSIS — E11.42 TYPE 2 DIABETES MELLITUS WITH PERIPHERAL NEUROPATHY: ICD-10-CM

## 2025-05-20 DIAGNOSIS — L97.522 DIABETIC ULCER OF TOE OF LEFT FOOT ASSOCIATED WITH DIABETES MELLITUS DUE TO UNDERLYING CONDITION, WITH FAT LAYER EXPOSED: Primary | ICD-10-CM

## 2025-05-20 DIAGNOSIS — L97.422 DIABETIC ULCER OF LEFT MIDFOOT ASSOCIATED WITH DIABETES MELLITUS DUE TO UNDERLYING CONDITION, WITH FAT LAYER EXPOSED: ICD-10-CM

## 2025-05-20 PROCEDURE — 29445 APPL RIGID TOT CNTC LEG CAST: CPT | Mod: HCNC,LT,S$GLB, | Performed by: PODIATRIST

## 2025-05-20 PROCEDURE — 3051F HG A1C>EQUAL 7.0%<8.0%: CPT | Mod: CPTII,HCNC,S$GLB, | Performed by: PODIATRIST

## 2025-05-20 PROCEDURE — 73630 X-RAY EXAM OF FOOT: CPT | Mod: TC,HCNC,PN,LT

## 2025-05-20 PROCEDURE — 99213 OFFICE O/P EST LOW 20 MIN: CPT | Mod: 25,HCNC,S$GLB, | Performed by: PODIATRIST

## 2025-05-20 PROCEDURE — 1159F MED LIST DOCD IN RCRD: CPT | Mod: CPTII,HCNC,S$GLB, | Performed by: PODIATRIST

## 2025-05-20 PROCEDURE — 99999 PR PBB SHADOW E&M-EST. PATIENT-LVL III: CPT | Mod: PBBFAC,HCNC,, | Performed by: PODIATRIST

## 2025-05-20 PROCEDURE — 1160F RVW MEDS BY RX/DR IN RCRD: CPT | Mod: CPTII,HCNC,S$GLB, | Performed by: PODIATRIST

## 2025-05-21 NOTE — PROGRESS NOTES
Subjective:     Patient ID: Indio Ryder Jr. is a 54 y.o. male.    Chief Complaint: Diabetic Foot Ulcer, surgical consult    Indio is a 54 y.o. male who presents to the clinic for evaluation and treatment of high risk feet. 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), Colon adenocarcinoma (04/12/2022), Diabetic ketoacidosis without coma associated with type 2 diabetes mellitus (05/30/2017), Diabetic ulcer of right foot associated with type 2 diabetes mellitus (06/03/2015), Disorder of kidney and ureter, Essential hypertension (06/06/2013), Group B streptococcal infection (01/13/2017), Mixed hyperlipidemia (08/12/2014), Septic arthritis of left foot (04/28/2021), Shoulder impingement (08/12/2014), Subacute osteomyelitis of right foot (01/12/2017), Traumatic amputation of fifth toe of right foot (07/02/2015), and Type 2 diabetes mellitus with diabetic neuropathy, with long-term current use of insulin (05/03/2016). The patient's chief complaint is foot ulcer along the left plantar midfoot.  Patient had been seeing Dr. Peterson in the [facility] on a biweekly basis for management of his left foot ulceration. He presents today for surgical consultation, evaluation for rearfoot fusion in regards to his left foot and ankle.  His last A1c was obtained in April, greater than 14.0.  Patient has had extensive pedal surgical history, last having had a debridement per Dr. Peterson in July of 2023. Patient had received a custom ankle brace in January and states that he subsequently had developed an ulceration as a result of it; previous to this, the ulceration of his left midfoot had healed over.     09/16/2024:  Follow-up from updated weight-bearing left foot and ankle x-ray imaging and lab work.  Seen per  in wound clinic on weekly basis.  Ambulating with cam boot.  States he is in the process of adjusting his medications due to uncontrolled hyperglycemia.    11/11/2024:  Returns for  re-evaluation of progressive varus deformity to left foot and chronic ulceration followed weekly at [facility].  States his hemoglobin A1c improved and he is monitoring his continuous glucose monitor noting that his daily blood sugar values improved a lot since the last A1c was taken on 10/16/2024 had shown value of 11%.  Denies any pain.  Ambulating with tall Cam boot.  States he change his diet.    11/22/2024:  Returns to discuss CT results.  Seen weekly in [facility].    12/16/2024:  Returns for wound check.  Three weeks postop.  Followed by [facility].  Tolerating IV cefepime well.  Seen per .  Nonweightbearing left lower extremity.    01/14/2025:  Presents for wound check.  Relates antibiotic therapy completed.  PICC line removed.  Denies any slips, trips or falls.  No new complaints.  Accompanied by his sister.    02/04/2025: Returns for wound check.  Scheduled for staged surgical intervention for arthrodesis of the left foot and removal of the external fixation next week on 02/12/2025.  Complains of the new onset wound to the left 3rd toe that began a couple weeks ago and was treated by [facility].  Believes the Ace wrap may have been too tight and caused a pressure wound.  Denies any trauma.    02/20/2025:  Post removal multiplane external fixation left lower extremity with arthrodesis multiple midtarsal and tarsal joints left foot on 02/12/2025.  No pain reported.  Denies any slips, trips or falls.  Utilizing rolling knee scooter.  Cast intact.    03/11/2025:  1 month postop.  Denies any slips, trips or falls.  Cast intact.  No pain reported.  No new concerns.    04/08/2025:  2 months postop.  Denies any slips, trips or falls.  Utilizing rolling knee scooter.  Cast intact.    05/06/2025: Returns for wound check and cast removal left lower extremity.  No new concerns.  States he had some anxiety with placing weight on the left foot for the follow-up left foot and ankle  x-ray.    05/20/2025:  Returns for wound check.  Left lower extremity total contact cast intact.  Denies any slips, trips or falls.  No pain.    PCP: Lorena Thakur MD      Current shoe gear:  Affected Foot: Surgical boot, rolling knee scooter     Unaffected Foot: Extra depth shoes    Hemoglobin A1C   Date Value Ref Range Status   01/02/2025 7.5 (H) 4.0 - 5.6 % Final     Comment:     ADA Screening Guidelines:  5.7-6.4%  Consistent with prediabetes  >or=6.5%  Consistent with diabetes    High levels of fetal hemoglobin interfere with the HbA1C  assay. Heterozygous hemoglobin variants (HbS, HgC, etc)do  not significantly interfere with this assay.   However, presence of multiple variants may affect accuracy.     10/16/2024 11.0 (H) 4.0 - 5.6 % Final     Comment:     ADA Screening Guidelines:  5.7-6.4%  Consistent with prediabetes  >or=6.5%  Consistent with diabetes    High levels of fetal hemoglobin interfere with the HbA1C  assay. Heterozygous hemoglobin variants (HbS, HgC, etc)do  not significantly interfere with this assay.   However, presence of multiple variants may affect accuracy.     08/08/2024 >14.0 (H) 4.0 - 5.6 % Final     Comment:     ADA Screening Guidelines:  5.7-6.4%  Consistent with prediabetes  >or=6.5%  Consistent with diabetes    High levels of fetal hemoglobin interfere with the HbA1C  assay. Heterozygous hemoglobin variants (HbS, HgC, etc)do  not significantly interfere with this assay.   However, presence of multiple variants may affect accuracy.         Review of Systems   Constitutional: Negative for chills and fever.   HENT:  Negative for congestion.    Eyes: Negative.    Cardiovascular:  Negative for chest pain.   Respiratory:  Negative for cough and shortness of breath.    Skin:  Positive for dry skin.   Musculoskeletal:  Negative for back pain and joint pain.   Gastrointestinal:  Negative for nausea and vomiting.   Neurological:  Positive for numbness.   Psychiatric/Behavioral:  Negative  for altered mental status.         Objective:     Physical Exam  Vitals reviewed.   Constitutional:       General: He is not in acute distress.     Appearance: He is not ill-appearing.   Cardiovascular:      Comments: Capillary refill greater than 3 seconds to left digits.     Pedal pulses heard only through Doppler bilateral:    Triphasic PT, monophasic DP left   Monophasic PT, monophasic DP right  Musculoskeletal:         General: No swelling or tenderness.      Right lower leg: No edema.      Left lower leg: No edema.      Comments: Amputated left 5th ray.  Improved clinical alignment to left foot with reducible varus rotation of the left foot which seems to be emanating from the ankle.  Moderate reduction in edema to left foot.   Feet:      Right foot:      Protective Sensation: 10 sites tested.  0 sites sensed.      Skin integrity: Ulcer present.      Left foot:      Protective Sensation: 10 sites tested.  0 sites sensed.   Skin:     General: Skin is warm and dry.      Findings: No ecchymosis or erythema.      Nails: There is no clubbing.      Comments: [place] wound site overlying the left 3rd toe healed with normal underlying appearing skin.    There is a new wound to the dorsal medial left hallux IPJ region with granular wound and slight bleeding.  No signs infection.  Extends down to subQ.  Does not probe, track or undermine.    There is another small wound to the posterior aspect of the left heel extending down to subQ with granular base and surrounding mild ecchymotic changes to the periwound skin.  No erythema.  Scant serosanguineous drainage.  Does not probe to bone.   Neurological:      Mental Status: He is alert and oriented to person, place, and time.      Cranial Nerves: No cranial nerve deficit.      Sensory: Sensory deficit present.      Comments: 0/10 [place]enita monofilament bilateral   Psychiatric:         Mood and Affect: Mood normal.         Assessment:      Encounter Diagnoses   Name  Primary?    Diabetic ulcer of toe of left foot associated with diabetes mellitus due to underlying condition, with fat layer exposed Yes    Decubitus ulcer of left heel, stage 2     Nonunion after arthrodesis     Type 2 diabetes mellitus with Charcot joint arthropathy     Type 2 diabetes mellitus with peripheral neuropathy      Plan:     Indio was seen today for post-op evaluation.    Diagnoses and all orders for this visit:    Diabetic ulcer of toe of left foot associated with diabetes mellitus due to underlying condition, with fat layer exposed    Decubitus ulcer of left heel, stage 2    Nonunion after arthrodesis    Type 2 diabetes mellitus with Charcot joint arthropathy    Type 2 diabetes mellitus with peripheral neuropathy      I counseled the patient on his conditions, their implications and medical management.    Cast and dressing to left lower extremity removed.  Left lower extremity cleansed Hibiclens scrub.  Applied Iodosorb with Aquacel over residual wound site left hallux and posterior left heel followed by coflex with calamine from the forefoot to proximal leg maintain the left foot in a reduced position.  Well-padded fiberglass total contact cast utilizing true cast kit applied to left lower extremity maintain the left foot 90° to the leg and a rectus and reduce the alignment.    Continue elevation at rest.    Patient is weight-bearing for transfers and very short distances less than 10 ft with a walker as needed.  He should continue using the rolling knee scooter.    Reviewed left foot and ankle x-ray completed today in detail.  They are non weight-bearing as ordered.  There is some residual mild adduction and varus rotation of the foot with absent 4th and 5th rays however the 4th toe is present.  Bio active glass has a course appearance in his intact in the region of the cuboid with intact fixation.  Left ankle x-ray shows some flattening of the talus with moderate osteophytic lipping of the distal  tibia and neck of the talus.  Note definitive consolidation of the arthrodesis sites.  There is some lucency surrounding the screws especially medial column screw indicative of motion.    RTC within 2 weeks for cast removal and follow-up weight-bearing left foot  x-ray.  Attempt to get bone stimulator once the wound sites are healed.    Assisted per Vishnu Lewis DPM PGY 2    A portion of this note was generated by voice recognition software and may contain spelling and grammar errors.

## 2025-06-03 ENCOUNTER — OFFICE VISIT (OUTPATIENT)
Dept: PODIATRY | Facility: CLINIC | Age: 55
End: 2025-06-03
Payer: MEDICARE

## 2025-06-03 VITALS
WEIGHT: 205 LBS | BODY MASS INDEX: 27.17 KG/M2 | HEART RATE: 101 BPM | DIASTOLIC BLOOD PRESSURE: 73 MMHG | SYSTOLIC BLOOD PRESSURE: 112 MMHG | HEIGHT: 73 IN

## 2025-06-03 DIAGNOSIS — M96.0 NONUNION AFTER ARTHRODESIS: ICD-10-CM

## 2025-06-03 DIAGNOSIS — E11.610 TYPE 2 DIABETES MELLITUS WITH CHARCOT JOINT ARTHROPATHY: ICD-10-CM

## 2025-06-03 DIAGNOSIS — E11.42 TYPE 2 DIABETES MELLITUS WITH PERIPHERAL NEUROPATHY: ICD-10-CM

## 2025-06-03 DIAGNOSIS — L97.522 DIABETIC ULCER OF TOE OF LEFT FOOT ASSOCIATED WITH DIABETES MELLITUS DUE TO UNDERLYING CONDITION, WITH FAT LAYER EXPOSED: Primary | ICD-10-CM

## 2025-06-03 DIAGNOSIS — E08.621 DIABETIC ULCER OF TOE OF LEFT FOOT ASSOCIATED WITH DIABETES MELLITUS DUE TO UNDERLYING CONDITION, WITH FAT LAYER EXPOSED: Primary | ICD-10-CM

## 2025-06-03 PROCEDURE — 99999 PR PBB SHADOW E&M-EST. PATIENT-LVL IV: CPT | Mod: PBBFAC,HCNC,, | Performed by: PODIATRIST

## 2025-06-04 ENCOUNTER — TELEPHONE (OUTPATIENT)
Dept: PODIATRY | Facility: CLINIC | Age: 55
End: 2025-06-04
Payer: MEDICARE

## 2025-06-04 ENCOUNTER — OFFICE VISIT (OUTPATIENT)
Dept: CARDIOLOGY | Facility: CLINIC | Age: 55
End: 2025-06-04
Payer: MEDICARE

## 2025-06-04 VITALS
SYSTOLIC BLOOD PRESSURE: 123 MMHG | OXYGEN SATURATION: 97 % | HEART RATE: 96 BPM | BODY MASS INDEX: 27.17 KG/M2 | WEIGHT: 205 LBS | HEIGHT: 73 IN | DIASTOLIC BLOOD PRESSURE: 77 MMHG

## 2025-06-04 DIAGNOSIS — I50.22 HEART FAILURE WITH MILDLY REDUCED EJECTION FRACTION: Primary | ICD-10-CM

## 2025-06-04 DIAGNOSIS — E78.2 MIXED HYPERLIPIDEMIA: ICD-10-CM

## 2025-06-04 PROCEDURE — 99999 PR PBB SHADOW E&M-EST. PATIENT-LVL IV: CPT | Mod: PBBFAC,HCNC,GC, | Performed by: STUDENT IN AN ORGANIZED HEALTH CARE EDUCATION/TRAINING PROGRAM

## 2025-07-01 ENCOUNTER — OFFICE VISIT (OUTPATIENT)
Dept: PODIATRY | Facility: CLINIC | Age: 55
End: 2025-07-01
Payer: MEDICARE

## 2025-07-01 ENCOUNTER — HOSPITAL ENCOUNTER (OUTPATIENT)
Facility: HOSPITAL | Age: 55
Discharge: HOME | End: 2025-07-01
Attending: PODIATRIST
Payer: MEDICARE

## 2025-07-01 VITALS — DIASTOLIC BLOOD PRESSURE: 69 MMHG | SYSTOLIC BLOOD PRESSURE: 118 MMHG | HEART RATE: 108 BPM

## 2025-07-01 DIAGNOSIS — M96.0 NONUNION AFTER ARTHRODESIS: ICD-10-CM

## 2025-07-01 DIAGNOSIS — R60.0 EDEMA OF LEFT LOWER EXTREMITY: ICD-10-CM

## 2025-07-01 DIAGNOSIS — E11.42 TYPE 2 DIABETES MELLITUS WITH PERIPHERAL NEUROPATHY: ICD-10-CM

## 2025-07-01 DIAGNOSIS — E11.610 TYPE 2 DIABETES MELLITUS WITH CHARCOT JOINT ARTHROPATHY: ICD-10-CM

## 2025-07-01 DIAGNOSIS — Z87.2 HEALED ULCER OF LEFT FOOT ON EXAMINATION: Primary | ICD-10-CM

## 2025-07-01 DIAGNOSIS — R29.898 WEAKNESS OF LEFT LOWER EXTREMITY: ICD-10-CM

## 2025-07-01 PROCEDURE — 73630 X-RAY EXAM OF FOOT: CPT | Mod: TC,HCNC,PN,LT

## 2025-07-01 PROCEDURE — 99999 PR PBB SHADOW E&M-EST. PATIENT-LVL III: CPT | Mod: PBBFAC,HCNC,, | Performed by: PODIATRIST

## 2025-07-02 NOTE — PROGRESS NOTES
Subjective:     Patient ID: Indio Ryder Jr. is a 54 y.o. male.    Chief Complaint: Diabetic Foot Ulcer, surgical consult    Indio is a 54 y.o. male who presents to the clinic for evaluation and treatment of high risk feet. 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), Colon adenocarcinoma (04/12/2022), Diabetic ketoacidosis without coma associated with type 2 diabetes mellitus (05/30/2017), Diabetic ulcer of right foot associated with type 2 diabetes mellitus (06/03/2015), Disorder of kidney and ureter, Essential hypertension (06/06/2013), Group B streptococcal infection (01/13/2017), Mixed hyperlipidemia (08/12/2014), Septic arthritis of left foot (04/28/2021), Shoulder impingement (08/12/2014), Subacute osteomyelitis of right foot (01/12/2017), Traumatic amputation of fifth toe of right foot (07/02/2015), and Type 2 diabetes mellitus with diabetic neuropathy, with long-term current use of insulin (05/03/2016). The patient's chief complaint is foot ulcer along the left plantar midfoot.  [place] [facility] [place] management of his left foot ulceration. He presents today for surgical consultation, evaluation for rearfoot fusion in regards to his left foot and ankle.  His last A1c was obtained in April, greater than 14.0.  Patient has had extensive pedal surgical history, last having had a debridement per Dr. Peterson in July of 2023. Patient had received a custom ankle brace in January and states that he subsequently had developed an ulceration as a result of it; previous to this, the ulceration of his left midfoot had healed over.     09/16/2024:  Follow-up from updated weight-bearing left foot and ankle x-ray imaging and lab work.  Seen per  in wound clinic on weekly basis.  Ambulating with cam boot.  States he is in the process of adjusting his medications due to uncontrolled hyperglycemia.    11/11/2024:  Returns for  re-evaluation of progressive varus deformity to left foot and chronic ulceration followed weekly at [facility].  States his hemoglobin A1c improved and he is monitoring his continuous glucose monitor noting that his daily blood sugar values improved a lot since the last A1c was taken on 10/16/2024 had shown value of 11%.  Denies any pain.  Ambulating with tall Cam boot.  States he change his diet.    11/22/2024:  Returns to discuss CT results.  Seen weekly in [facility].    12/16/2024:  Returns for wound check.  Three weeks postop.  Followed by [facility] home [facility].  Tolerating IV cefepime well.  Seen per .  Nonweightbearing left lower extremity.    01/14/2025:  Presents for wound check.  Relates antibiotic therapy completed.  PICC line removed.  Denies any slips, trips or falls.  No new complaints.  Accompanied by his sister.    02/04/2025: Returns for wound check.  Scheduled for staged surgical intervention for arthrodesis of the left foot and removal of the external fixation next week on 02/12/2025.  Complains of the new onset wound to the left 3rd toe that began a couple weeks ago and was treated by [facility].  Believes the Ace wrap may have been too tight and caused a pressure wound.  Denies any trauma.    02/20/2025:  Post removal multiplane external fixation left lower extremity with arthrodesis multiple midtarsal and tarsal joints left foot on 02/12/2025.  No pain reported.  Denies any slips, trips or falls.  Utilizing rolling knee scooter.  Cast intact.    03/11/2025:  1 month postop.  Denies any slips, trips or falls.  Cast intact.  No pain reported.  No new concerns.    04/08/2025:  2 months postop.  Denies any slips, trips or falls.  Utilizing rolling knee scooter.  Cast intact.    05/06/2025: Returns for wound check and cast removal left lower extremity.  No new concerns.  States he had some anxiety with placing weight on the left foot for the follow-up left foot and ankle  x-ray.    05/20/2025:  Returns for wound check.  Left lower extremity total contact cast intact.  Denies any slips, trips or falls.  No pain.    06/03/2025:  Follow-up left foot x-ray completed today.  Total contact cast intact.    07/02/2025:  Follow-up Charcot arthropathy left foot.  Ambulating for short distances with Cam boot but states he has some anxiety with trying to walk for long distances.  Still utilizing his rolling knee scooter.  Follow up left foot x-rays completed today however patient was not completely weight-bearing as instructed.  No pain reported.  No recurrence of wounds to left foot.    PCP: Lorena Thakur MD      Current shoe gear:  Affected Foot: Surgical boot, rolling knee scooter     Unaffected Foot: Extra depth shoes    Hemoglobin A1C   Date Value Ref Range Status   01/02/2025 7.5 (H) 4.0 - 5.6 % Final     Comment:     ADA Screening Guidelines:  5.7-6.4%  Consistent with prediabetes  >or=6.5%  Consistent with diabetes    High levels of fetal hemoglobin interfere with the HbA1C  assay. Heterozygous hemoglobin variants (HbS, HgC, etc)do  not significantly interfere with this assay.   However, presence of multiple variants may affect accuracy.     10/16/2024 11.0 (H) 4.0 - 5.6 % Final     Comment:     ADA Screening Guidelines:  5.7-6.4%  Consistent with prediabetes  >or=6.5%  Consistent with diabetes    High levels of fetal hemoglobin interfere with the HbA1C  assay. Heterozygous hemoglobin variants (HbS, HgC, etc)do  not significantly interfere with this assay.   However, presence of multiple variants may affect accuracy.     08/08/2024 >14.0 (H) 4.0 - 5.6 % Final     Comment:     ADA Screening Guidelines:  5.7-6.4%  Consistent with prediabetes  >or=6.5%  Consistent with diabetes    High levels of fetal hemoglobin interfere with the HbA1C  assay. Heterozygous hemoglobin variants (HbS, HgC, etc)do  not significantly interfere with this assay.   However, presence of multiple variants may  affect accuracy.         Review of Systems   Constitutional: Negative for chills and fever.   HENT:  Negative for congestion.    Eyes: Negative.    Cardiovascular:  Negative for chest pain.   Respiratory:  Negative for cough and shortness of breath.    Skin:  Positive for dry skin.   Musculoskeletal:  Negative for back pain and joint pain.   Gastrointestinal:  Negative for nausea and vomiting.   Neurological:  Positive for numbness.   Psychiatric/Behavioral:  Negative for altered mental status.         Objective:     Physical Exam  Vitals reviewed.   Constitutional:       General: He is not in acute distress.     Appearance: He is not ill-appearing.   Cardiovascular:      Comments: Capillary refill greater than 3 seconds to left digits.     Pedal pulses heard only through Doppler bilateral:    Triphasic PT, monophasic DP left   Monophasic PT, monophasic DP right  Musculoskeletal:         General: No swelling or tenderness.      Right lower leg: No edema.      Left lower leg: No edema.      Comments: Amputated left 5th ray.  Improved clinical alignment to left foot with mild reducible varus rotation of the left foot which seems to be emanating from the ankle.  Moderate reduction in edema to left foot.  Left ankle range motion appears to be improved with normal dorsiflexion.  No significant midtarsal joint range motion appreciated left foot.  No hindfoot motion.   Feet:      Right foot:      Protective Sensation: 10 sites tested.  0 sites sensed.      Skin integrity: Ulcer present.      Left foot:      Protective Sensation: 10 sites tested.  0 sites sensed.   Skin:     General: Skin is warm and dry.      Findings: No ecchymosis or erythema.      Nails: There is no clubbing.      Comments: Skin is dry and flaky to left foot.  No recurrent wounds.   Neurological:      Mental Status: He is alert and oriented to person, place, and time.      Cranial Nerves: No cranial nerve deficit.      Sensory: Sensory deficit present.       Comments: 0/10 [place]-Nen[place] monofilament bilateral   Psychiatric:         Mood and Affect: Mood normal.         Assessment:      Encounter Diagnoses   Name Primary?    Healed ulcer of left foot on examination Yes    Nonunion after arthrodesis     Type 2 diabetes mellitus with Charcot joint arthropathy     Type 2 diabetes mellitus with peripheral neuropathy     Edema of left lower extremity     Weakness of left lower extremity      Plan:     Indio was seen today for follow-up.    Diagnoses and all orders for this visit:    Healed ulcer of left foot on examination  -     ANKLE FOOT ORTHOSIS FOR HOME USE  -     Ambulatory Referral/Consult to Physical Therapy    Nonunion after arthrodesis  -     ANKLE FOOT ORTHOSIS FOR HOME USE  -     X-Ray Foot Complete Left; Future    Type 2 diabetes mellitus with Charcot joint arthropathy  -     ANKLE FOOT ORTHOSIS FOR HOME USE  -     COMPRESSION STOCKINGS  -     Ambulatory Referral/Consult to Physical Therapy  -     X-Ray Foot Complete Left; Future    Type 2 diabetes mellitus with peripheral neuropathy  -     ANKLE FOOT ORTHOSIS FOR HOME USE  -     COMPRESSION STOCKINGS  -     Ambulatory Referral/Consult to Physical Therapy    Edema of left lower extremity  -     COMPRESSION STOCKINGS    Weakness of left lower extremity      I counseled the patient on his conditions, their implications and medical management.    Reviewed left foot x-ray completed today in detail.  No significant change since the last x-ray.  There is some residual mild adduction and varus rotation of the foot with absent 4th and 5th rays however the 4th toe is present.  Bio active glass has a course appearance in his intact in the region of the cuboid with intact fixation.  Left ankle x-ray shows some flattening of the talus with moderate osteophytic lipping of the distal tibia and neck of the talus.  Note definitive consolidation of the arthrodesis sites.  There is some lucency surrounding the screws  especially medial column screw within the talus indicative of motion.    Bone stimulator approval?    Prescription for crow boot.  Weight-bearing as tolerated with Cam boot.  Daily foot checks as discussed.    Compression stockings 20-30 mm Hg to be worn throughout the day for edema management.    RTC within 6-8 weeks for follow-up left foot x-ray or p.r.n. as discussed.  If there is any significant collapse or breakdown of the midfoot then patient will need tibial talocalcaneal arthrodesis.  Referral placed to physical therapy for gradual strengthening due to severe deconditioning.    A portion of this note was generated by voice recognition software and may contain spelling and grammar errors.

## 2025-07-09 ENCOUNTER — CLINICAL SUPPORT (OUTPATIENT)
Dept: REHABILITATION | Facility: HOSPITAL | Age: 55
End: 2025-07-09
Payer: MEDICARE

## 2025-07-09 DIAGNOSIS — M25.672 DECREASED RANGE OF MOTION OF LEFT ANKLE: Primary | ICD-10-CM

## 2025-07-09 DIAGNOSIS — G83.14: ICD-10-CM

## 2025-07-09 PROCEDURE — 97163 PT EVAL HIGH COMPLEX 45 MIN: CPT

## 2025-07-09 PROCEDURE — 97110 THERAPEUTIC EXERCISES: CPT

## 2025-07-09 NOTE — PROGRESS NOTES
Outpatient Rehab    Physical Therapy Evaluation    Patient Name: Indio Ryder Jr.  MRN: [id number]  YOB: 1970  Encounter Date: 7/9/2025    Therapy Diagnosis:   Encounter Diagnoses   Name Primary?    Decreased range of motion of left ankle Yes    Paresis of left lower extremity      Physician: Daniel Reyes DPM    Physician Orders: Eval and Treat  Medical Diagnosis: Healed ulcer of left foot on examination  Type 2 diabetes mellitus with Charcot joint arthropathy  Type 2 diabetes mellitus with peripheral neuropathy  Surgical Diagnosis: REMOVAL, EXTERNAL FIXATION DEVICE (Left) lower extremity, Triple arthrodesis (Left) foot, Fusion of multiple midtarsal joints left foot,  Application of below knee cast left lower extremity   Surgical Date: 2/12/2025  Days Since Last Surgery: 147    Visit # / Visits Authorized:  1 / 1  Insurance Authorization Period: 7/1/2025 to 7/1/2026  Date of Evaluation: 7/9/2025  Plan of Care Certification: 7/9/2025 to 9/3/2025     Time In: 1120   Time Out: 1200  Total Time (in minutes): 40   Total Billable Time (in minutes): 40    Intake Outcome Measure for FOTO Survey    Therapist reviewed FOTO scores for Indio Ryder Jr. on 7/9/2025.   FOTO report - see Media section or FOTO account episode details.     Intake Score: 43%    Precautions:  Left Lower Extremity Weight-Bearing Status: Weight-bearing as tolerated  WBAT in cam boot, diabetes, s/p L foot triple arthrodesis and midtarsal joint fusion      Subjective   History of Present Illness  Indio is a 54 y.o. male who reports to physical therapy with a chief concern of s/p triple arthrodesis of L foot.     The patient reports a medical diagnosis of Z87.2 (ICD-10-CM) - Healed ulcer of left foot on examination  E11.610 (ICD-10-CM) - Type 2 diabetes mellitus with Charcot joint arthropathy  E11.42 (ICD-10-CM) - Type 2 diabetes mellitus with peripheral neuropathy.  Patient reports a surgery of REMOVAL, EXTERNAL FIXATION  DEVICE (Left) lower extremity, Triple arthrodesis (Left) foot, Fusion of multiple midtarsal joints left foot,  Application of below knee cast left lower extremity. Surgery occurred on 02/12/25. Diagnostic tests related to this condition: X-ray.   X-Ray Details: 7/1/2025: FINDINGS:  Postsurgical change of triple arthrodesis.  No hardware fracture or displacement.  Questionable osseous fusion across the 1st TMT and talonavicular joints.  No definite osseous fusion across the subtalar joint.  Alignment is stable.  Diffuse soft tissue swelling.    History of Present Condition/Illness: Patient is a 53 y/o male presenting to physical therapy 21 weeks s/p L foot triple arthrodesis and fusion of multiple midtarsal joints. He reports he was having difficulty with his foot rolling when he walked due to decreased tension in his foot which caused multiple ulcers over the span of a few months. He reports this led him to have the foot surgery to prevent future ulcers and try to avoid amputation. He has not had any pain since surgery, but he reports that he is unable to feel his ankle/foot due to neuropathy. He also reports that he had a full cast until about a month ago when he received a boot. He is scared to walk long distances and still uses the scooter despite being WBAT with the cam boot. He also has a healing ulcer on his R foot right now, so he is limiting walking. He reports he has done nothing since surgery, and he has barely moved. His goal with therapy is to begin moving more and be able to move his foot.     Activities of Daily Living      General Prior Level of Function Comments: No difficulty with walking or fucnitonal tasks  General Current Level of Function Comments: Severe difficutly with walking and functional tasks           Pain     Patient reports a current pain level of 0/10. Pain at best is reported as 0/10. Pain at worst is reported as 0/10.   Clinical Progression (since onset): Stable  No pain because he  cannot feel his foot      Review of Systems  Additional Red Flag Details: Foul smell from wound and very dry wound bed      Treatment History  Treatments  Discharged From Past 30 Days: Home health care    Living Arrangements  Living Situation  Housing: Home independently        Employment  Patient does not report that: Does the patient's condition impact their ability to work?  Employment Status: Not employed          Past Medical History/Physical Systems Review:   Indio Ryder Jr.  has a past medical history of Actinomyces infection, Colon adenocarcinoma, Diabetic ketoacidosis without coma associated with type 2 diabetes mellitus, Diabetic ulcer of right foot associated with type 2 diabetes mellitus, Disorder of kidney and ureter, Essential hypertension, Group B streptococcal infection, Mixed hyperlipidemia, Septic arthritis of left foot, Shoulder impingement, Subacute osteomyelitis of right foot, Traumatic amputation of fifth toe of right foot, and Type 2 diabetes mellitus with diabetic neuropathy, with long-term current use of insulin.    Indio Ryder Jr.  has a past surgical history that includes Toe amputation (Right, 06/05/2015); Toe amputation (Right, 01/19/2017); Debridement of foot (Left, 7/14/2019); Debridement of foot (Left, 7/17/2019); Debridement of foot (Bilateral, 4/29/2021); Colonoscopy (Right, 1/19/2022); Colonoscopy (N/A, 3/21/2022); xi robotic colectomy, right (N/A, 4/25/2022); xi robotic colectomy, right (4/25/2022); Debridement of foot (Left, 7/31/2023); Colonoscopy (N/A, 9/13/2023); irrigation and debridement (Left, 11/27/2024); application, external fixation system, multiplanar, with imaging guidance (Left, 11/27/2024); Removal of external fixation device (Left, 2/12/2025); and Foot arthrodesis (Left, 2/12/2025).    Indio has a current medication list which includes the following prescription(s): atorvastatin, blood sugar diagnostic, blood-glucose meter, cholecalciferol (vitamin  d3), empagliflozin, hydrocodone-acetaminophen, insulin aspart u-100, insulin glargine u-300 conc, lancets, and pen needle, diabetic, and the following Facility-Administered Medications: sodium chloride 0.9%.    Review of patient's allergies indicates:  No Known Allergies     Objective   Bracing      Cam boot on L foot        Ankle/Foot Observations     Very dry foot throughout; multiple spots of raw skin, yellow discoloring on calcaneus, significantly dried wound bed, and foul smelling wound      Lower Extremity Sensation  General Lumbar/Lower Extremity Sensation  Impaired: Left       Left Lumbar/Lower Extremity Sensation  Absent: Light Touch  Left Lumbar/Lower Extremity Sensation Light Touch Comment: unable to feel L foot              Ankle/Foot Range of Motion   Right Ankle/Foot   Active (deg) Passive (deg) Pain   Dorsiflexion (KE) 5 5     Dorsiflexion (KF)         Plantar Flexion 50 50     Ankle Inversion 30 30     Ankle Eversion 10 10     Subtalar Inversion         Subtalar Eversion         Great Toe MTP Flexion         Great Toe MTP Extension         Great Toe IP Flexion             Left Ankle/Foot   Active (deg) Passive (deg) Pain   Dorsiflexion (KE)   -5     Dorsiflexion (KF)         Plantar Flexion   50     Ankle Inversion   10     Ankle Eversion   0     Subtalar Inversion         Subtalar Eversion         Great Toe MTP Flexion         Great Toe MTP Extension         Great Toe IP Flexion             Unable to actively move left foot               Ankle/Foot Strength - Planes of Motion   Right Strength Right Pain Left Strength Left  Pain   Dorsiflexion (L4) 3   0     Plantar Flexion (S1) 3   0     Inversion 3   0     Eversion 3   0     Great Toe Flexion     0     Great Toe Extension (L5)     0     Lesser Toes Flexion     0     Lesser Toes Extension     0     Able to activate soleus, but unable to activate gastroc       Knee Special Tests  Right Proximal Tibia and Fibula Joint: Normal  Left Proximal Tibia and  Fibula Joint: Hypomobile         Ankle/Foot Special Tests  Not performed due to post op status         Ankle/Foot Joint Mobility  Right Ankle/Foot Joint Mobility  Normal: Proximal Tibia and Fibula Joint, Distal Tibia and Fibula Joint, Talocrural Joint, Subtalar Joint, Midfoot, and Forefoot  Left Ankle/Foot Joint Mobility  Hypermobile: Forefoot  Hypomobile: Proximal Tibia and Fibula Joint, Distal Tibia and Fibula Joint, Talocrural Joint, Subtalar Joint, and Midfoot           Transfers/Bed Mobility Details  Able to transfer independently       Four Stage Balance Test                       Not performed due to weightbearing precautions     Timed Up & Go (TUG)        An older adult who takes >=12 seconds to complete the TUG is at risk for falling.    Not performed due to weightbearing precautions           Treatment:  Therapeutic Exercise  TE 1: sitting heel raises 15x  TE 2: clams RTB 10x each side  TE 3: LAQs RTB 10x each side      Time Entry(in minutes):  PT Evaluation (Complex) Time Entry: 30  Therapeutic Exercise Time Entry: 10    Assessment & Plan   Assessment  Indio presents with a condition of High complexity.   Presentation of Symptoms: Stable  Will Comorbidities Impact Care: No       Functional Limitations: Activity tolerance, Ambulating on uneven surfaces, Completing self-care activities, Completing work/school activities, Decreased ambulation distance/endurance, Functional mobility, Gait limitations, Increased risk of fall, Maintaining balance, Painful locomotion/ambulation, Participating in leisure activities, Participating in sports, Range of motion, Standing tolerance, Transfers, Squatting, Performing household chores  Impairments: Impaired physical strength, Impaired balance, Pain with functional activity, Weight-bearing intolerance, Lack of appropriate home exercise program, Abnormal or restricted range of motion, Activity intolerance, Abnormal muscle firing, Abnormal gait, Abnormal muscle  tone  Personal Factors Affecting Prognosis: Schedule, Transportation    Patient Goal for Therapy (PT): to return to moving around his house more and moving his foot  Prognosis: Guarded  Prognosis Details: Long history of diabetes, extensive foot surgery, past foot surgeries, unable to activate muscles   Assessment Details: Indio is a 54 y.o. male referred to outpatient Physical Therapy with a medical diagnosis of Z87.2 (ICD-10-CM) - Healed ulcer of left foot on examination E11.610 (ICD-10-CM) - Type 2 diabetes mellitus with Charcot joint arthropathy E11.42 (ICD-10-CM) - Type 2 diabetes mellitus with peripheral neuropathy. Patient presents with 21 weeks post surgery with difficulty activating any muscles in his foot/ankle complex and inability to move or feel his L foot/ankle. He presented with a foul smelling wound and significant decrease in moisture throughout the entire foot. Patient demonstrates deficits with range of motion, strength, and function that limit ability to participate in work and recreational activities. Will assess next visit again to see if he is an appropriate candidate for physical therapy. They could benefit from skilled PT services to normalize kinetic chain mobility, strength, and function to safely return to their prior level of activity.     Plan  From a physical therapy perspective, the patient would benefit from: Skilled Rehab Services    Planned therapy interventions include: Therapeutic exercise, Therapeutic activities, Neuromuscular re-education, Manual therapy, and ADLs/IADLs.    Planned modalities to include: Biofeedback, Electrical stimulation - attended, Electrical stimulation - passive/unattended, Thermotherapy (hot pack), and Cryotherapy (cold pack).        Visit Frequency: 2 times Per Week for 8 Weeks.       This plan was discussed with Patient.   Discussion participants: Agreed Upon Plan of Care  Plan details: 1-2 times per week           The patient's spiritual, cultural,  and educational needs were considered, and the patient is agreeable to the plan of care and goals.     Education  Education was done with Patient. The patient's learning style includes Demonstration and Listening. The patient Demonstrates understanding and Verbalizes understanding.         Proper exercise form, continuing HEP       Goals:   Active       Long Term Goals       1.Patient goal: to return to moving around his house more        Start:  07/09/25    Expected End:  09/03/25            2.Increase strength to 3/5 for L foot/ankle to increase tolerance for ADL and work activities.        Start:  07/09/25    Expected End:  09/03/25            3. Pt will report at CJ level (20-40% impaired) on Modified FIM score for mobility to demonstrate increase in LE function and mobility in home and community environment.         Start:  07/09/25    Expected End:  09/03/25               Short Term Goals       1. Increase ROM by 10 degrees in order to walk with min to no compensation.        Start:  07/09/25    Expected End:  08/06/25            2. Increase strength by 1/3 MMT grade for L foot/ankle  to increase tolerance for ADL and work activities.        Start:  07/09/25    Expected End:  08/06/25            3. Pt to tolerate HEP to improve ROM and independence with ADL's        Start:  07/09/25    Expected End:  08/06/25                Bala Alva, PT, DPT

## 2025-07-28 ENCOUNTER — TELEPHONE (OUTPATIENT)
Dept: PODIATRY | Facility: CLINIC | Age: 55
End: 2025-07-28
Payer: MEDICARE

## 2025-07-28 NOTE — TELEPHONE ENCOUNTER
Copied from CRM #[id number]. Topic: General Inquiry - Patient Advice  >> Jul 28, 2025  3:38 PM Rose Marie wrote:  Type:  Needs Medical Advice    Who Called: Patient   Symptoms (please be specific):  n/a    How long has patient had these symptoms:   n/a   Pharmacy name and phone #:  n/a   Would the patient rather a call back or a response via [facility]? Call back   Best Call Back Number: [phone]  Additional Information:  Patient is requesting a call back in reference to a boot ordered by physician Please Assist    Called patient and left a detailed message regarding his crow boot that Dr. Reyes ordered for him at his last office visit.     Zaira

## 2025-08-05 ENCOUNTER — HOSPITAL ENCOUNTER (OUTPATIENT)
Facility: HOSPITAL | Age: 55
Discharge: HOME | End: 2025-08-05
Attending: PODIATRIST
Payer: MEDICARE

## 2025-08-05 ENCOUNTER — OFFICE VISIT (OUTPATIENT)
Dept: PODIATRY | Facility: CLINIC | Age: 55
End: 2025-08-05
Payer: MEDICARE

## 2025-08-05 VITALS — HEART RATE: 104 BPM | SYSTOLIC BLOOD PRESSURE: 135 MMHG | DIASTOLIC BLOOD PRESSURE: 78 MMHG

## 2025-08-05 DIAGNOSIS — E11.610 TYPE 2 DIABETES MELLITUS WITH CHARCOT JOINT ARTHROPATHY: ICD-10-CM

## 2025-08-05 DIAGNOSIS — M96.0 NONUNION AFTER ARTHRODESIS: ICD-10-CM

## 2025-08-05 DIAGNOSIS — E11.42 TYPE 2 DIABETES MELLITUS WITH PERIPHERAL NEUROPATHY: ICD-10-CM

## 2025-08-05 DIAGNOSIS — M96.0 NONUNION AFTER ARTHRODESIS: Primary | ICD-10-CM

## 2025-08-05 PROCEDURE — 3075F SYST BP GE 130 - 139MM HG: CPT | Mod: CPTII,HCNC,S$GLB, | Performed by: PODIATRIST

## 2025-08-05 PROCEDURE — 1159F MED LIST DOCD IN RCRD: CPT | Mod: CPTII,HCNC,S$GLB, | Performed by: PODIATRIST

## 2025-08-05 PROCEDURE — 73630 X-RAY EXAM OF FOOT: CPT | Mod: 26,HCNC,LT, | Performed by: RADIOLOGY

## 2025-08-05 PROCEDURE — 73630 X-RAY EXAM OF FOOT: CPT | Mod: TC,HCNC,PN,LT

## 2025-08-05 PROCEDURE — 99999 PR PBB SHADOW E&M-EST. PATIENT-LVL III: CPT | Mod: PBBFAC,HCNC,, | Performed by: PODIATRIST

## 2025-08-05 PROCEDURE — 99213 OFFICE O/P EST LOW 20 MIN: CPT | Mod: HCNC,S$GLB,, | Performed by: PODIATRIST

## 2025-08-05 PROCEDURE — 3051F HG A1C>EQUAL 7.0%<8.0%: CPT | Mod: CPTII,HCNC,S$GLB, | Performed by: PODIATRIST

## 2025-08-05 PROCEDURE — 1160F RVW MEDS BY RX/DR IN RCRD: CPT | Mod: CPTII,HCNC,S$GLB, | Performed by: PODIATRIST

## 2025-08-05 PROCEDURE — 3078F DIAST BP <80 MM HG: CPT | Mod: CPTII,HCNC,S$GLB, | Performed by: PODIATRIST

## 2025-08-05 NOTE — PROGRESS NOTES
Subjective:     Patient ID: Indio Ryder Jr. is a 55 y.o. male.    Chief Complaint: Diabetic Foot Ulcer, surgical consult    Indio is a 55 y.o. male who presents to the clinic for evaluation and treatment of high risk feet. 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), Colon adenocarcinoma (04/12/2022), Diabetic ketoacidosis without coma associated with type 2 diabetes mellitus (05/30/2017), Diabetic ulcer of right foot associated with type 2 diabetes mellitus (06/03/2015), Disorder of kidney and ureter, Essential hypertension (06/06/2013), Group B streptococcal infection (01/13/2017), Mixed hyperlipidemia (08/12/2014), Septic arthritis of left foot (04/28/2021), Shoulder impingement (08/12/2014), Subacute osteomyelitis of right foot (01/12/2017), Traumatic amputation of fifth toe of right foot (07/02/2015), and Type 2 diabetes mellitus with diabetic neuropathy, with long-term current use of insulin (05/03/2016). The patient's chief complaint is foot ulcer along the left plantar midfoot.  Patient had been seeing Dr. Peterson in the [facility] on a biweekly basis for management of his left foot ulceration. He presents today for surgical consultation, evaluation for rearfoot fusion in regards to his left foot and ankle.  His last A1c was obtained in April, greater than 14.0.  Patient has had extensive pedal surgical history, last having had a debridement per Dr. Peterson in July of 2023. Patient had received a custom ankle brace in January and states that he subsequently had developed an ulceration as a result of it; previous to this, the ulceration of his left midfoot had healed over.     09/16/2024:  Follow-up from updated weight-bearing left foot and ankle x-ray imaging and lab work.  Seen per  in wound clinic on weekly basis.  Ambulating with cam boot.  States he is in the process of adjusting his medications due to uncontrolled hyperglycemia.    11/11/2024:  Returns for  re-evaluation of progressive varus deformity to left foot and chronic ulceration followed weekly at [facility].  States his hemoglobin A1c improved and he is monitoring his continuous glucose monitor noting that his daily blood sugar values improved a lot since the last A1c was taken on 10/16/2024 had shown value of 11%.  Denies any pain.  Ambulating with tall Cam boot.  States he change his diet.    11/22/2024:  Returns to discuss CT results.  Seen weekly in [facility].    12/16/2024:  Returns for wound check.  Three weeks postop.  Followed by [facility] home [facility].  Tolerating IV cefepime well.  Seen per .  Nonweightbearing left lower extremity.    01/14/2025:  Presents for wound check.  Relates antibiotic therapy completed.  PICC line removed.  Denies any slips, trips or falls.  No new complaints.  Accompanied by his sister.    02/04/2025: Returns for wound check.  Scheduled for staged surgical intervention for arthrodesis of the left foot and removal of the external fixation next week on 02/12/2025.  Complains of the new onset wound to the left 3rd toe that began a couple weeks ago and was treated by [facility].  Believes the Ace wrap may have been too tight and caused a pressure wound.  Denies any trauma.    02/20/2025:  Post removal multiplane external fixation left lower extremity with arthrodesis multiple midtarsal and tarsal joints left foot on 02/12/2025.  No pain reported.  Denies any slips, trips or falls.  Utilizing rolling knee scooter.  Cast intact.    03/11/2025:  1 month postop.  Denies any slips, trips or falls.  Cast intact.  No pain reported.  No new concerns.    04/08/2025:  2 months postop.  Denies any slips, trips or falls.  Utilizing rolling knee scooter.  Cast intact.    05/06/2025: Returns for wound check and cast removal left lower extremity.  No new concerns.  States he had some anxiety with placing weight on the left foot for the follow-up left foot and ankle  x-ray.    05/20/2025:  Returns for wound check.  Left lower extremity total contact cast intact.  Denies any slips, trips or falls.  No pain.    06/03/2025:  Follow-up left foot x-ray completed today.  Total contact cast intact.    07/02/2025:  Follow-up Charcot arthropathy left foot.  Ambulating for short distances with Cam boot but states he has some anxiety with trying to walk for long distances.  Still utilizing his rolling knee scooter.  Follow up left foot x-rays completed today however patient was not completely weight-bearing as instructed.  No pain reported.  No recurrence of wounds to left foot.    08/05/2025:  Follow-up for Charcot arthropathy left foot.  He has been utilizing the cam boot.  He was unable to get a crow boot from DME provider on [address] however checked with his insurance and with [facility], and is accepted there therefore requesting new prescription.  Follow up x-ray completed today.  No other concerns.    PCP: Lorena Thakur MD      Current shoe gear:  Affected Foot: Surgical boot, rolling knee scooter     Unaffected Foot: Extra depth shoes    Hemoglobin A1C   Date Value Ref Range Status   01/02/2025 7.5 (H) 4.0 - 5.6 % Final     Comment:     ADA Screening Guidelines:  5.7-6.4%  Consistent with prediabetes  >or=6.5%  Consistent with diabetes    High levels of fetal hemoglobin interfere with the HbA1C  assay. Heterozygous hemoglobin variants (HbS, HgC, etc)do  not significantly interfere with this assay.   However, presence of multiple variants may affect accuracy.     10/16/2024 11.0 (H) 4.0 - 5.6 % Final     Comment:     ADA Screening Guidelines:  5.7-6.4%  Consistent with prediabetes  >or=6.5%  Consistent with diabetes    High levels of fetal hemoglobin interfere with the HbA1C  assay. Heterozygous hemoglobin variants (HbS, HgC, etc)do  not significantly interfere with this assay.   However, presence of multiple variants may affect accuracy.     08/08/2024 >14.0 (H) 4.0 - 5.6 %  Final     Comment:     ADA Screening Guidelines:  5.7-6.4%  Consistent with prediabetes  >or=6.5%  Consistent with diabetes    High levels of fetal hemoglobin interfere with the HbA1C  assay. Heterozygous hemoglobin variants (HbS, HgC, etc)do  not significantly interfere with this assay.   However, presence of multiple variants may affect accuracy.         Review of Systems   Constitutional: Negative for chills and fever.   HENT:  Negative for congestion.    Eyes: Negative.    Cardiovascular:  Negative for chest pain.   Respiratory:  Negative for cough and shortness of breath.    Skin:  Positive for dry skin.   Musculoskeletal:  Negative for back pain and joint pain.   Gastrointestinal:  Negative for nausea and vomiting.   Neurological:  Positive for numbness.   Psychiatric/Behavioral:  Negative for altered mental status.         Objective:     Physical Exam  Vitals reviewed.   Constitutional:       General: He is not in acute distress.     Appearance: He is not ill-appearing.   Cardiovascular:      Comments: Capillary refill greater than 3 seconds to left digits.     Pedal pulses heard only through Doppler bilateral:    Triphasic PT, monophasic DP left   Monophasic PT, monophasic DP right  Musculoskeletal:         General: No swelling or tenderness.      Right lower leg: No edema.      Left lower leg: No edema.      Comments: Amputated left 5th ray.  Improved clinical alignment to left foot with mild reducible varus rotation of the left foot which seems to be emanating from the ankle.  Moderate reduction in edema to left foot.  Left ankle range motion appears to be improved with normal dorsiflexion.  No significant midtarsal joint range motion appreciated left foot.  No hindfoot motion.   Feet:      Right foot:      Protective Sensation: 10 sites tested.  0 sites sensed.      Skin integrity: Ulcer present.      Left foot:      Protective Sensation: 10 sites tested.  0 sites sensed.   Skin:     General: Skin is warm  and dry.      Findings: No ecchymosis or erythema.      Nails: There is no clubbing.      Comments: Skin is dry and flaky to left foot.  No recurrent wounds.   Neurological:      Mental Status: He is alert and oriented to person, place, and time.      Cranial Nerves: No cranial nerve deficit.      Sensory: Sensory deficit present.      Comments: 0/10 Wann-Nenita monofilament bilateral   Psychiatric:         Mood and Affect: Mood normal.         Assessment:      Encounter Diagnoses   Name Primary?    Nonunion after arthrodesis Yes    Type 2 diabetes mellitus with Charcot joint arthropathy     Type 2 diabetes mellitus with peripheral neuropathy      Plan:     nIdio was seen today for follow-up, foot ulcer and diabetes mellitus.    Diagnoses and all orders for this visit:    Nonunion after arthrodesis  -     ANKLE FOOT ORTHOSIS FOR HOME USE  -     X-Ray Foot Complete Left; Future    Type 2 diabetes mellitus with Charcot joint arthropathy  -     ANKLE FOOT ORTHOSIS FOR HOME USE  -     X-Ray Foot Complete Left; Future    Type 2 diabetes mellitus with peripheral neuropathy  -     ANKLE FOOT ORTHOSIS FOR HOME USE      I counseled the patient on his conditions, their implications and medical management.    Reviewed left foot x-ray completed today in detail.  No significant change since the last x-ray.  There is some residual mild adduction and varus rotation of the foot with absent 4th and 5th rays however the 4th toe is present.  Bio active glass has a course appearance in his intact in the region of the cuboid with intact fixation.  Left ankle x-ray shows some flattening of the talus with moderate osteophytic lipping of the distal tibia and neck of the talus.  Note definitive consolidation of the arthrodesis sites.  There is some lucency surrounding the screws especially medial column screw within the talus indicative of motion.    New prescription for crow boot to left lower extremity.    Weight-bearing as  tolerated with Cam boot.  Daily foot checks as discussed for recurrent wounds.    RTC within 3 months for surveillance weight-bearing left foot x-ray or p.r.n. as discussed    Assisted by Randell King DPM PGY 3    A portion of this note was generated by voice recognition software and may contain spelling and grammar errors.

## 2025-08-22 ENCOUNTER — TELEPHONE (OUTPATIENT)
Dept: PODIATRY | Facility: CLINIC | Age: 55
End: 2025-08-22
Payer: MEDICARE

## (undated) DEVICE — BLADE SCALP OPHTL BEVEL STR

## (undated) DEVICE — STOCKINET TUBULAR 1 PLY 6X60IN

## (undated) DEVICE — SUT MONOCRYL 4-0 PS-2

## (undated) DEVICE — SUT 0 54IN COATED VICRYL U

## (undated) DEVICE — IMPLANTABLE DEVICE
Type: IMPLANTABLE DEVICE | Site: FOOT | Status: NON-FUNCTIONAL
Removed: 2025-02-12

## (undated) DEVICE — GLOVE BIOGEL ECLIPSE SZ 7.5

## (undated) DEVICE — SUT 3-0 VICRYL PLUS CP-1

## (undated) DEVICE — BRACE LOWER LEG SIZE 8-9 LARGE

## (undated) DEVICE — SEE MEDLINE ITEM 156953

## (undated) DEVICE — BLADE SAGITTA 5/BX

## (undated) DEVICE — PAD PREP CUFFED NS 24X48IN

## (undated) DEVICE — TRAY SKIN SCRUB WET PREMIUM

## (undated) DEVICE — SUT VICRYL PLUS 0 CT1 18IN

## (undated) DEVICE — STRIP PK IODOFORM CURITY 2X5YD

## (undated) DEVICE — SUT ETHILON 4-0 PS2 18 BLK

## (undated) DEVICE — DRESSING XEROFORM FOIL PK 1X8

## (undated) DEVICE — SUT PROLENE 4-0 PS2 18 BLUE

## (undated) DEVICE — DRESSING GAUZE XEROFORM 5X9

## (undated) DEVICE — COVER OVERHEAD SURG LT BLUE

## (undated) DEVICE — ELECTRODE REM PLYHSV RETURN 9

## (undated) DEVICE — SPONGE GAUZE 16PLY 4X4

## (undated) DEVICE — DRAPE PLASTIC U 60X72

## (undated) DEVICE — BANDAGE MATRIX HK LOOP 4IN 5YD

## (undated) DEVICE — DRAPE INCISE IOBAN 2 23X23IN

## (undated) DEVICE — DRESSING N ADH OIL EMUL 3X3

## (undated) DEVICE — BLADE SURG CARBON STEEL #10

## (undated) DEVICE — NDL STRAIGHT 4CM LEIBINGER

## (undated) DEVICE — KIT IRR SUCTION HND PIECE

## (undated) DEVICE — SEE MEDLINE ITEM 152515

## (undated) DEVICE — GAUZE SPONGE BULKEE 6X6.75IN

## (undated) DEVICE — SEAL UNIVERSAL 5MM-8MM XI

## (undated) DEVICE — SUT CTD VICRYL VIL BR SH 27

## (undated) DEVICE — GOWN POLY REINF BRTH SLV XL

## (undated) DEVICE — TOURNIQUET SB QC DP 18X4IN

## (undated) DEVICE — RELOAD SUREFORM 60 3.5 BLU 6R

## (undated) DEVICE — DRESSING INFOVAC MED RND BLK

## (undated) DEVICE — PULSAVAC ZIMMER

## (undated) DEVICE — GOWN SMART IMP BREATHABLE XXLG

## (undated) DEVICE — SEE MEDLINE ITEM 157216

## (undated) DEVICE — SEALER VESSEL EXTEND

## (undated) DEVICE — PAD PINK TRENDELENBURG POS XL

## (undated) DEVICE — SEE MEDLINE ITEM 152537

## (undated) DEVICE — BANDAGE MATRIX HK LOOP 2IN 5YD

## (undated) DEVICE — Device

## (undated) DEVICE — DRAPE SPLIT STERILE

## (undated) DEVICE — BLADE SURG #15 CARBON STEEL

## (undated) DEVICE — SYR 30CC LUER LOCK

## (undated) DEVICE — SUT PROLENE 3-0 PS-2 BL 18IN

## (undated) DEVICE — GAUZE WOVEN STRL 8PLY 2X2IN

## (undated) DEVICE — DRESSING XEROFORM 1X8IN

## (undated) DEVICE — APPLICATOR CHLORAPREP ORN 26ML

## (undated) DEVICE — STOCKINET 4INX48

## (undated) DEVICE — SPONGE DERMACEA GAUZE 4X4

## (undated) DEVICE — NDL HYPO REG 25G X 1 1/2

## (undated) DEVICE — GLOVE BIOGEL ECLIPSE SZ 8

## (undated) DEVICE — SUT 3-0 ETHILON 18 FS-1

## (undated) DEVICE — BOWL STERILE LARGE 32OZ

## (undated) DEVICE — BANDAGE ESMARK ELASTIC ST 4X9

## (undated) DEVICE — CONTAINER MULTIPURP W/LID 16OZ

## (undated) DEVICE — KIT PULSAVAC PLUS FAN

## (undated) DEVICE — SUT PROLENE 4-0 MONO 18IN

## (undated) DEVICE — DRESSING ADAPTIC N ADH 3X8IN

## (undated) DEVICE — UNDERGLOVES BIOGEL PI SIZE 8

## (undated) DEVICE — WRAP COBAN NL STRL 4INX5YD

## (undated) DEVICE — CANNISTER WOUND VAC 300ML

## (undated) DEVICE — SOL IRR NACL .9% 3000ML

## (undated) DEVICE — TOWEL OR DISP STRL BLUE 4/PK

## (undated) DEVICE — SUPPORT ULNA NERVE PROTECTOR

## (undated) DEVICE — PROFILE DRILL

## (undated) DEVICE — GAUZE SPONGE 4'X4 12 PLY

## (undated) DEVICE — DRAPE ABDOMINAL TIBURON 14X11

## (undated) DEVICE — SUT MCRYL PLUS 4-0 PS2 27IN

## (undated) DEVICE — DRESSING XEROFORM NONADH 1X8IN

## (undated) DEVICE — SUT ETHILON 3-0 PS2 18 BLK

## (undated) DEVICE — SEE MEDLINE ITEM 156902

## (undated) DEVICE — CANISTER INFOV.A.C WOUND 500ML

## (undated) DEVICE — GLOVE BIOGEL PI MICRO INDIC 8

## (undated) DEVICE — GAUZE CNFRM STRL 4INX4.1YD

## (undated) DEVICE — SOL IRR 0.9% NACL 500ML PB

## (undated) DEVICE — SEE MEDLINE ITEM 152745

## (undated) DEVICE — GAUZE CNFRM STRL 2INX4.1YD

## (undated) DEVICE — STRIP WOUND PK BIGUANIDE 36X.5

## (undated) DEVICE — TIP YANKAUERS BULB NO VENT

## (undated) DEVICE — NDL INSUF ULTRA VERESS 120MM

## (undated) DEVICE — SEE L#120831

## (undated) DEVICE — PAD ABD 8X10 STERILE

## (undated) DEVICE — DRAPE TIBURON ORTHOPEDIC SPLIT

## (undated) DEVICE — PACK EXTREMITY KENNER

## (undated) DEVICE — SET BASIN 48X48IN 6000ML RING

## (undated) DEVICE — SPONGE LAP 18X18 PREWASHED

## (undated) DEVICE — BANDAGE DERMACEA STRETCH 4X1IN

## (undated) DEVICE — BLADE SAG MIC FINE 9.5X25.5MM

## (undated) DEVICE — DRAPE SCOPE PILLOW WARMER

## (undated) DEVICE — PAD PREP 50/CA

## (undated) DEVICE — SPONGE COTTON TRAY 4X4IN

## (undated) DEVICE — BANDAGE ESMARK 6X12

## (undated) DEVICE — LUBRICANT SURGILUBE 2 OZ

## (undated) DEVICE — SUT VICRYL CTD 2-0 GI 27 SH

## (undated) DEVICE — SOL ELECTROLUBE ANTI-STIC

## (undated) DEVICE — SUT PROLENE 0 CT1 30IN BLUE

## (undated) DEVICE — DRAPE THREE-QTR REINF 53X77IN

## (undated) DEVICE — DRAPE STERI INSTRUMENT 1018

## (undated) DEVICE — SUT MONOCRYL 3-0 SH U/D

## (undated) DEVICE — PAD CAST SPECIALIST STRL 4

## (undated) DEVICE — TRAY MINOR ORTHO OMC

## (undated) DEVICE — SOL CLEARIFY VISUALIZATION LAP

## (undated) DEVICE — SCRUB DYNA-HEX LIQ 4% CHG 4OZ

## (undated) DEVICE — PENCIL ROCKER SWITCH 10FT CORD

## (undated) DEVICE — SEE MEDLINE ITEM 152530

## (undated) DEVICE — BANDAGE ESMARK ELASTIC ST 6X9

## (undated) DEVICE — SET TRI-LUMEN FILTERED TUBE

## (undated) DEVICE — STAPLER SUREFORM 60 SPU

## (undated) DEVICE — SEE MEDLINE ITEM 152622

## (undated) DEVICE — SEE MEDLINE ITEM 154981

## (undated) DEVICE — ADHESIVE DERMABOND ADVANCED

## (undated) DEVICE — DRAPE C ARM 42 X 120 10/BX

## (undated) DEVICE — CLIPPER BLADE MOD 4406 (CAREF)

## (undated) DEVICE — SUT 1 27IN PDS II VIO MONO

## (undated) DEVICE — TAPE CURAD SILK ADH 3INX10YD

## (undated) DEVICE — DRESSING AQUACEL SACRAL 9 X 9

## (undated) DEVICE — DRAPE MINI C-ARM 54 X 64

## (undated) DEVICE — CONTAINER SPECIMEN STRL 4OZ

## (undated) DEVICE — GAUZE SPONGE 4X4 12PLY

## (undated) DEVICE — DRAPE ARM DAVINCI XI

## (undated) DEVICE — DRAPE C-ARM/MOBILE XRAY 44X80

## (undated) DEVICE — BLADE SURG CARBON STEEL SZ11

## (undated) DEVICE — DRILL

## (undated) DEVICE — SYR 10CC LUER LOCK

## (undated) DEVICE — OBTURATOR BLADELESS 8MM XI

## (undated) DEVICE — MARKER SKIN STND TIP BLUE BARR

## (undated) DEVICE — GLOVE SENSICARE PI SHIELD 8

## (undated) DEVICE — COVER LIGHT HANDLE

## (undated) DEVICE — DRESSING N ADH OIL EMUL 3X8

## (undated) DEVICE — SEE MEDLINE ITEM 146298

## (undated) DEVICE — GOWN ISOLATION IMPERVIOUS

## (undated) DEVICE — NDL 18GA X1 1/2 REG BEVEL

## (undated) DEVICE — DRAPE SURGICAL STERI IRRG PCH

## (undated) DEVICE — TUBING SUCTION STERILE

## (undated) DEVICE — SUT 0 27IN COATED VICRYL CP

## (undated) DEVICE — COVER LIGHT HANDLE 80/CA

## (undated) DEVICE — BOWL CEMENT

## (undated) DEVICE — SCRUB 10% POVIDONE IODINE 4OZ

## (undated) DEVICE — DRAPE COLUMN DAVINCI XI

## (undated) DEVICE — TRAY FOLEY 16FR INFECTION CONT

## (undated) DEVICE — SEE MEDLINE ITEM 157131

## (undated) DEVICE — STRIP PACKING ANTIMIC 1/4X1

## (undated) DEVICE — PADDING CAST SPECIALIST 6X4YD

## (undated) DEVICE — SUT ETHILON 3/0 18IN PS-1

## (undated) DEVICE — DRAPE STERI U-SHAPED 47X51IN

## (undated) DEVICE — DRAPE C-ARMOR EQUIPMENT COVER

## (undated) DEVICE — PEN LIGHTS DIAGNOSTIC C-LINE

## (undated) DEVICE — SEE MEDLINE ITEM 157181

## (undated) DEVICE — SUT CTD VICRYL 4-0 BR PS-2

## (undated) DEVICE — SUT 0 VICRYL / CT-1

## (undated) DEVICE — LEGGINGS 48X31 INCH

## (undated) DEVICE — SUT VLOC 90 3-0 V-20 NDL 9

## (undated) DEVICE — TRAY MINOR ORTHO

## (undated) DEVICE — VAC WOUND ULTRA THERAPY

## (undated) DEVICE — BANDAGE KERLIX AMD

## (undated) DEVICE — TOWEL OR XRAY BLUE 17X26IN

## (undated) DEVICE — SUT MONOCYRL 4-0 PS2 UND

## (undated) DEVICE — SUT ETHIBOND EXCEL 0 MO6 18

## (undated) DEVICE — DRESSING XEROFORM FOIL 4X4

## (undated) DEVICE — SEE MEDLINE ITEM 152522

## (undated) DEVICE — ALCOHOL 70% ISOP W/GREEN 16OZ

## (undated) DEVICE — GOWN POLY REINF BRTH SLV LG

## (undated) DEVICE — SUT PROLENE 4-0 FS-2 BL MON

## (undated) DEVICE — COVER TIP CURVED SCISSORS XI

## (undated) DEVICE — NDL 20GX1-1/2IN IB

## (undated) DEVICE — SEE MEDLINE ITEM 146270

## (undated) DEVICE — SEE MEDLINE ITEM 152523

## (undated) DEVICE — TOURNIQUET SB QC DP 24X4IN

## (undated) DEVICE — PORT AIRSEAL 12/120MM LPI